# Patient Record
Sex: FEMALE | Race: WHITE | NOT HISPANIC OR LATINO | Employment: OTHER | URBAN - METROPOLITAN AREA
[De-identification: names, ages, dates, MRNs, and addresses within clinical notes are randomized per-mention and may not be internally consistent; named-entity substitution may affect disease eponyms.]

---

## 2017-01-13 ENCOUNTER — OFFICE VISIT (OUTPATIENT)
Dept: INTERNAL MEDICINE | Facility: CLINIC | Age: 57
End: 2017-01-13
Payer: COMMERCIAL

## 2017-01-13 VITALS
TEMPERATURE: 98 F | WEIGHT: 136 LBS | OXYGEN SATURATION: 96 % | SYSTOLIC BLOOD PRESSURE: 112 MMHG | DIASTOLIC BLOOD PRESSURE: 64 MMHG | HEART RATE: 59 BPM | BODY MASS INDEX: 25.71 KG/M2

## 2017-01-13 DIAGNOSIS — M79.5 FOREIGN BODY (FB) IN SOFT TISSUE: ICD-10-CM

## 2017-01-13 DIAGNOSIS — E55.9 VITAMIN D DEFICIENCY: ICD-10-CM

## 2017-01-13 DIAGNOSIS — Z11.59 NEED FOR HEPATITIS C SCREENING TEST: ICD-10-CM

## 2017-01-13 DIAGNOSIS — S69.91XS: Primary | ICD-10-CM

## 2017-01-13 DIAGNOSIS — E78.5 HYPERLIPIDEMIA LDL GOAL <70: ICD-10-CM

## 2017-01-13 LAB
ALBUMIN SERPL-MCNC: 4.1 G/DL (ref 3.4–5)
ALP SERPL-CCNC: 71 U/L (ref 40–150)
ALT SERPL W P-5'-P-CCNC: 34 U/L (ref 0–50)
ANION GAP SERPL CALCULATED.3IONS-SCNC: 3 MMOL/L (ref 3–14)
AST SERPL W P-5'-P-CCNC: 14 U/L (ref 0–45)
BILIRUB SERPL-MCNC: 0.4 MG/DL (ref 0.2–1.3)
BUN SERPL-MCNC: 8 MG/DL (ref 7–30)
CALCIUM SERPL-MCNC: 10 MG/DL (ref 8.5–10.1)
CHLORIDE SERPL-SCNC: 98 MMOL/L (ref 94–109)
CHOLEST SERPL-MCNC: 140 MG/DL
CO2 SERPL-SCNC: 31 MMOL/L (ref 20–32)
CREAT SERPL-MCNC: 0.64 MG/DL (ref 0.52–1.04)
GFR SERPL CREATININE-BSD FRML MDRD: ABNORMAL ML/MIN/1.7M2
GLUCOSE SERPL-MCNC: 91 MG/DL (ref 70–99)
HDLC SERPL-MCNC: 47 MG/DL
LDLC SERPL CALC-MCNC: 74 MG/DL
NONHDLC SERPL-MCNC: 93 MG/DL
POTASSIUM SERPL-SCNC: 5 MMOL/L (ref 3.4–5.3)
PROT SERPL-MCNC: 7.5 G/DL (ref 6.8–8.8)
SODIUM SERPL-SCNC: 132 MMOL/L (ref 133–144)
TRIGL SERPL-MCNC: 97 MG/DL

## 2017-01-13 PROCEDURE — 10120 INC&RMVL FB SUBQ TISS SMPL: CPT | Performed by: INTERNAL MEDICINE

## 2017-01-13 PROCEDURE — 36415 COLL VENOUS BLD VENIPUNCTURE: CPT | Performed by: INTERNAL MEDICINE

## 2017-01-13 PROCEDURE — 80061 LIPID PANEL: CPT | Performed by: INTERNAL MEDICINE

## 2017-01-13 PROCEDURE — 86803 HEPATITIS C AB TEST: CPT | Performed by: INTERNAL MEDICINE

## 2017-01-13 PROCEDURE — 80053 COMPREHEN METABOLIC PANEL: CPT | Performed by: INTERNAL MEDICINE

## 2017-01-13 PROCEDURE — 82306 VITAMIN D 25 HYDROXY: CPT | Performed by: INTERNAL MEDICINE

## 2017-01-13 PROCEDURE — 99215 OFFICE O/P EST HI 40 MIN: CPT | Mod: 25 | Performed by: INTERNAL MEDICINE

## 2017-01-13 NOTE — MR AVS SNAPSHOT
After Visit Summary   1/13/2017    Shirley Hendricks    MRN: [id number]           Patient Information     Date Of Birth          1960        Visit Information        Provider Department      1/13/2017 10:00 AM Eun Narvaez MD [facility]        Today's Diagnoses     Injury of right little finger, sequela    -  1     Vitamin D deficiency         Hyperlipidemia LDL goal <70         Need for hepatitis C screening test         Foreign body (FB) in soft tissue           Care Instructions    ** FOLLOW UP PLAN**:    PLEASE SCHEDULE OFFICE VISIT SOONEST AVAILABILITY FROM TODAY TO FOLLOW UP ON CHOLESTEROL, OTHER MEDICAL ISSUES, AND TO REVIEW TEST RESULTS       BE SURE TO SCHEDULE YOUR LAB DRAW APPOINTMENT FOR AT LEAST 1 WEEK BEFORE YOUR NEXT VISIT      YOU HAVE BEEN REFERRED FOR ORTHOPEDICS - HAND SURGERY TO ADDRESS ISSUES RAISED TODAY REGARDING HAND INJURY  PLEASE  MAKE SURE TO SCHEDULE ORTHOPEDICS APPOINTMENT(S) BY TELEPHONE      YOU MAY CONTACT THE CLINIC IF ANY QUESTIONS OR CONCERNS -[phone] OR [facility]                   Follow-ups after your visit        Additional Services     ORTHO  REFERRAL       [facility] is referring you to the Orthopedic  Services at [facility].       The  Representative will assist you in the coordination of your Orthopedic and Musculoskeletal Care as prescribed by your physician.    The  Representative will call you within 1 business day to help schedule your appointment, or you may contact the  Representative at:    All areas ~ [phone]     Type of Referral : hand surgery       Timeframe requested: 3 - 5 days    Coverage of these services is subject to the terms and limitations of your health insurance plan.  Please call member services at your health plan with any benefit or coverage questions.      If X-rays, CT or MRI's have been  performed, please contact the facility where they were done to arrange for , prior to your scheduled appointment.  Please bring this referral request to your appointment and present it to your specialist.                  Who to contact     If you have questions or need follow up information about today's clinic visit or your schedule please contact [facility] directly at [phone].  Normal or non-critical lab and imaging results will be communicated to you by MyChart, letter or phone within 4 business days after the clinic has received the results. If you do not hear from us within 7 days, please contact the clinic through [employer] or phone. If you have a critical or abnormal lab result, we will notify you by phone as soon as possible.  Submit refill requests through [employer] or call your pharmacy and they will forward the refill request to us. Please allow 3 business days for your refill to be completed.          Additional Information About Your Visit        [employer] Information     [employer] gives you secure access to your electronic health record. If you see a primary care provider, you can also send messages to your care team and make appointments. If you have questions, please call your primary care clinic.  If you do not have a primary care provider, please call [phone] and they will assist you.        Care EveryWhere ID     This is your Care EveryWhere ID. This could be used by other organizations to access your [facility] medical records  [id number]        Your Vitals Were     Pulse Temperature Pulse Oximetry             59 98  F (36.7  C) (Oral) 96%          Blood Pressure from Last 3 Encounters:   01/13/17 112/64   12/27/16 124/75   12/14/16 135/62    Weight from Last 3 Encounters:   01/13/17 136 lb (61.689 kg)   12/27/16 138 lb 5 oz (62.738 kg)   12/14/16 135 lb (61.236 kg)              We Performed the Following     Comprehensive metabolic panel     Hepatitis C  antibody     Lipid panel reflex to direct LDL     ORTHO  REFERRAL     REMOVE FOREIGN BODY SIMPLE     Vitamin D Deficiency        Primary Care Provider Office Phone # Fax #    Eun Narvaez [phone]       [facility] [address]  [facility] [address]        Thank you!     Thank you for choosing [facility]  for your care. Our goal is always to provide you with excellent care. Hearing back from our patients is one way we can continue to improve our services. Please take a few minutes to complete the written survey that you may receive in the mail after your visit with us. Thank you!             Your Updated Medication List - Protect others around you: Learn how to safely use, store and throw away your medicines at www.disposemymeds.org.          This list is accurate as of: 1/13/17 11:36 AM.  Always use your most recent med list.                   Brand Name Dispense Instructions for use    albuterol 108 (90 BASE) MCG/ACT Inhaler    PROAIR HFA/PROVENTIL HFA/VENTOLIN HFA    1 Inhaler    Inhale 2 puffs into the lungs every 6 hours as needed for shortness of breath / dyspnea or wheezing       ASPIRIN EC PO      Take 81 mg by mouth daily       atorvastatin 80 MG tablet    LIPITOR    90 tablet    Take 1 tablet (80 mg) by mouth every evening INDICATION: TO LOWER CHOLESTEROL AND TO HELP REDUCE RISK OF REOCCURRENCE OF HEART ATTACK STROKE, AND ARTERIAL BLOCKAGE       B COMPLEX 50 Tabs     100 tablet    Take 1 tablet by mouth every other day INDICATION: VITAMIN SUPPLEMENT       buPROPion 150 MG 12 hr tablet    WELLBUTRIN SR    60 tablet    Take 1 tablet (150 mg) by mouth 2 times daily INDICATION: TO TREAT DEPRESSION AND SMOKING CESSATION       calcium-vitamin D 600-400 MG-UNIT per tablet    calcium 600 + D    100 tablet    Take 1 tablet by mouth 2 times daily FOR BONE HEALTH AND FOR VITAMIN D DEFICIENCY (LOW VITAMIN D)       cholecalciferol 5000 UNITS Caps      100 capsule    Take 1 capsule (5,000 Units) by mouth every other day FOR VITAMIN D SUPPLEMENTATION       clopidogrel 75 MG tablet    PLAVIX    90 tablet    Take 1 tablet (75 mg) by mouth daily INDICATION: ANTIPLATE       cyabnocobalamin 2500 MCG sublingual tablet    VITAMIN B-12    100 tablet    Take 2,500 mcg by mouth daily INDICATION: FOR VITAMIN B12 SUPPLEMENTATION - TO IMPROVE MEMORY, BALANCE, SLEEP AND MOOD, DIRECTIONS: PLEASE PLACE UNDER THE TONGUE TO DISSOLVE       denosumab 60 MG/ML Soln injection    PROLIA    1 Syringe    Inject 1 mL (60 mg) Subcutaneous every 6 months INDICATION: TO TREAT OSTEOPOROSIS       ezetimibe 10 MG tablet    ZETIA    90 tablet    Take 1 tablet (10 mg) by mouth daily INDICATION: TO LOWER CHOLESTEROL       ferrous fumarate 65 mg ([place]. FE)-Vitamin C 125 mg  MG Tabs tablet    VITRON C    100 tablet    Take 1 tablet by mouth daily INDICATION: IRON SUPPLEMENT       ISOSORBIDE DINITRATE PO      Take 30 mg by mouth every morning Verified with Cub and patient med bottle as 30 mg Dinitrate daily.       lisinopril 40 MG tablet    PRINIVIL/ZESTRIL    90 tablet    Take 1 tablet (40 mg) by mouth daily INDICATION:TO LOWER BLOOD PRESSURE AND TO PRESERVE KIDNEY FUNCTION       METOPROLOL SUCCINATE ER PO      Take 12.5 mg by mouth 2 times daily       * nicotine 14 MG/24HR 24 hr patch    NICODERM CQ    30 patch    Place 1 patch onto the skin every 24 hours       * nicotine 7 MG/24HR 24 hr patch    NICODERM CQ    28 patch    Place 1 patch onto the skin every 24 hours       nicotine polacrilex 4 MG lozenge    COMMIT    100 tablet    Place 1 lozenge (4 mg) inside cheek as needed for smoking cessation Place 1 lozenge inside cheek as needed for smoking cessation.       nitroglycerin 0.4 MG sublingual tablet    NITROSTAT    15 tablet    Place 1 tablet (0.4 mg) under the tongue See Admin Instructions for chest pain       OMEGA-3 FISH OIL PO      Take 2 capsules by mouth 2 times daily        omeprazole 20 MG CR capsule    priLOSEC    90 capsule    Take 1 capsule (20 mg) by mouth daily INDICATION: TO CONTROL REFLUX SYMPTOMS       order for DME     1 Device    Equipment being ordered: BP MACHINE WITH PULSE MONITOR       traMADol 50 MG tablet    ULTRAM    120 tablet    Take 1 tablet (50 mg) by mouth every 6 hours as needed for moderate pain       TYLENOL PO      Take 500-1,000 mg by mouth every 6 hours as needed for mild pain or fever       vitamin C-electrolytes 1000mg vitamin C super orange drink mix    EMERGEN-C     Take 1 packet by mouth daily Mix 1 packet in 4-6oz water and take once or twice daily or as directed.       * Notice:  This list has 2 medication(s) that are the same as other medications prescribed for you. Read the directions carefully, and ask your doctor or other care provider to review them with you.

## 2017-01-13 NOTE — NURSING NOTE
"Chief Complaint   Patient presents with     Hyperlipidemia     Hypertension       Initial /64 mmHg  Pulse 59  Temp(Src) 98  F (36.7  C) (Oral)  Wt 136 lb (61.689 kg)  SpO2 96% Estimated body mass index is 25.71 kg/(m^2) as calculated from the following:    Height as of 12/14/16: 5' 1\" (1.549 m).    Weight as of this encounter: 136 lb (61.689 kg).  BP completed using cuff size: jocelyne Maynard CMA     "

## 2017-01-13 NOTE — PROGRESS NOTES
SUBJECTIVE:                                                    Shirley Hendricks is a 56 year old female who presents to clinic today for the following health issues:      Hyperlipidemia Follow-Up      Rate your low fat/cholesterol diet?: not monitoring fat    Taking statin?  Yes, no muscle aches from statin    Other lipid medications/supplements?:  Fish oil/Omega 3,     Hypertension Follow-up      Outpatient blood pressures are not being checked.    Low Salt Diet: no added salt     Vitamin D deficiency         Amount of exercise or physical activity: None    Problems taking medications regularly: No    Medication side effects: none    Diet: low salt    Injuryto the right pinky finger. She injured her finger on broken glass as she was doing dishes on 12/16/16.  Pt states she had stitches at the time, and was later given an antibiotic  for infection . Shirley is concerned regarding how the finger is healing. She has been good about not taking care of the area. The only time she has had the finger wet is when she does the dishes.      Foreign body: She is also concerned about the foreign body on the pad of the index finger of the right hand. She does not recall the date of the injury but states that it has been bothering her for a while.    Smoking still    Problem list and histories reviewed & adjusted, as indicated.  Additional history: as documented    Labs reviewed in EPIC  Problem list, Medication list, Allergies, and Medical/Social/Surgical histories reviewed in [facility] and updated as appropriate.    ROS:  14 point ROS negative except as above      OBJECTIVE:                                                    /64 mmHg  Pulse 59  Temp(Src) 98  F (36.7  C) (Oral)  Wt 136 lb (61.689 kg)  SpO2 96%  Body mass index is 25.71 kg/(m^2).  GENERAL: healthy, alert and no distress  EYES: Eyes grossly normal to inspection, PERRL and conjunctivae and sclerae normal  NECK: no adenopathy, no asymmetry, masses, or  scars and thyroid normal to palpation  RESP: lungs clear to auscultation - no rales, rhonchi or wheezes  CV: regular rate and rhythm, normal S1 S2, no S3 or S4, no murmur, click or rub, no peripheral edema and peripheral pulses strong  ABDOMEN: soft, nontender, no hepatosplenomegaly, no masses and bowel sounds normal  MS: abnormal DP pulse(s) bilaterally, but both extremities warm to touch, Slight flexion deformity of the DIP of the pinky finger of right hand. Question splinter in the pad of the right index finger.     Procedure:  FOREIGN BODY REMOVED BY SHARP LOCAL DISSECTION AFTER ADEQUATE SKIN PREP STERILE DRESSING APPLIED, PT TO FOLLOW UP AS NEEDED      Diagnostic Test Results:  none      ASSESSMENT/PLAN:                                                    Hyperlipidemia; controlled   Plan:  No changes in the patient's current treatment plan    Hypertension; controlled   Associated with the following complications:    Vascular Disease   Plan:  No changes in the patient's current treatment plan    Peripheral Vascular Disease (PVD); controlled   Associated with the following complications:    Atherosclerosis of the extremities with intermittent claudication   Plan:  No changes in the patient's current treatment plan        Tobacco Cessation:   reports that she quit smoking about 8 months ago. Her smoking use included Cigarettes. She started smoking about 58 years ago. She has a 40 pack-year smoking history. She has never used smokeless tobacco.        DIAGNOSIS/PLAN:     ICD-10-CM    1. Injury of right little finger, sequela S69.91XS ORTHO  REFERRAL   2. Vitamin D deficiency E55.9 Comprehensive metabolic panel     Vitamin D Deficiency   3. Hyperlipidemia LDL goal <70 E78.5 Comprehensive metabolic panel     Lipid panel reflex to direct LDL   4. Need for hepatitis C screening test Z11.59 Hepatitis C antibody   5. Foreign body (FB) in soft tissue M79.5 REMOVE FOREIGN BODY SIMPLE       SIGNIFICANT ISSUES RE  The primary encounter diagnosis was Injury of right little finger, sequela. Diagnoses of Vitamin D deficiency, Hyperlipidemia LDL goal <70, Need for hepatitis C screening test, and Foreign body (FB) in soft tissue were also pertinent to this visit. AS NOTED AND ADDRESSED ABOVE   MEDS AND AND LABS AS ORDERED TO ADDRESS PREVIOUS AND CURRENT ABNORMAL INDICES    SEE PATIENT INSTRUCTION SECTION FOR FOLLOW UP PLAN    Shirley IS TO CONTINUE OTHER TREATMENT REGIMEN/PLANS EXCEPT AS INDICATED    COMPLIANCE WITH MEDICATIONS DIET AND EXERCISE PLANS ENCOURAGED    DISCONTINUED MEDS:  Medications Discontinued During This Encounter   Medication Reason     fluticasone (FLOVENT HFA) 220 MCG/ACT inhaler Stopped by Patient     sulfamethoxazole-trimethoprim (BACTRIM DS/SEPTRA DS) 800-160 MG per tablet Therapy completed       CURRENT MED LIST WITH CHANGES AS NOTED BELOW:  Current Outpatient Prescriptions   Medication Sig Dispense Refill     nicotine (NICODERM CQ) 7 MG/24HR patch 2h hr Place 1 patch onto the skin every 24 hours 28 patch 5     nicotine polacrilex (COMMIT) 4 MG lozenge Place 1 lozenge (4 mg) inside cheek as needed for smoking cessation Place 1 lozenge inside cheek as needed for smoking cessation. 100 tablet 5     nicotine (NICODERM CQ) 14 MG/24HR patch 2h hr Place 1 patch onto the skin every 24 hours 30 patch 5     buPROPion (WELLBUTRIN SR) 150 MG 12 hr tablet Take 1 tablet (150 mg) by mouth 2 times daily INDICATION: TO TREAT DEPRESSION AND SMOKING CESSATION 60 tablet 12     ferrous fumarate 65 mg, [place]. FE,-Vitamin C 125 mg (VITRON C)  MG TABS Take 1 tablet by mouth daily INDICATION: IRON SUPPLEMENT 100 tablet prn     Acetaminophen (TYLENOL PO) Take 500-1,000 mg by mouth every 6 hours as needed for mild pain or fever       Omega-3 Fatty Acids (OMEGA-3 FISH OIL PO) Take 2 capsules by mouth 2 times daily       METOPROLOL SUCCINATE ER PO Take 12.5 mg by mouth 2 times daily       vitamin C-electrolytes (EMERGEN-C) 1000mg  vitamin C super orange drink mix Take 1 packet by mouth daily Mix 1 packet in 4-6oz water and take once or twice daily or as directed.       ASPIRIN EC PO Take 81 mg by mouth daily       ISOSORBIDE DINITRATE PO Take 30 mg by mouth every morning Verified with Cub and patient med bottle as 30 mg Dinitrate daily.       Cyanocobalamin (VITAMIN  B-12) 2500 MCG tablet Take 2,500 mcg by mouth daily INDICATION: FOR VITAMIN B12 SUPPLEMENTATION - TO IMPROVE MEMORY, BALANCE, SLEEP AND MOOD, DIRECTIONS: PLEASE PLACE UNDER THE TONGUE TO DISSOLVE 100 tablet 3     cholecalciferol 5000 UNITS CAPS Take 1 capsule (5,000 Units) by mouth every other day FOR VITAMIN D SUPPLEMENTATION 100 capsule PRN     omeprazole (PRILOSEC) 20 MG capsule Take 1 capsule (20 mg) by mouth daily INDICATION: TO CONTROL REFLUX SYMPTOMS 90 capsule prn     B Complex Vitamins (B COMPLEX 50) TABS Take 1 tablet by mouth every other day INDICATION: VITAMIN SUPPLEMENT 100 tablet PRN     ezetimibe (ZETIA) 10 MG tablet Take 1 tablet (10 mg) by mouth daily INDICATION: TO LOWER CHOLESTEROL 90 tablet PRN     calcium-vitamin D (CALCIUM 600 + D) 600-400 MG-UNIT per tablet Take 1 tablet by mouth 2 times daily FOR BONE HEALTH AND FOR VITAMIN D DEFICIENCY (LOW VITAMIN D) 100 tablet PRN     lisinopril (PRINIVIL,ZESTRIL) 40 MG tablet Take 1 tablet (40 mg) by mouth daily INDICATION:TO LOWER BLOOD PRESSURE AND TO PRESERVE KIDNEY FUNCTION 90 tablet 3     clopidogrel (PLAVIX) 75 MG tablet Take 1 tablet (75 mg) by mouth daily INDICATION: ANTIPLATE 90 tablet 3     albuterol (PROAIR HFA, PROVENTIL HFA, VENTOLIN HFA) 108 (90 BASE) MCG/ACT inhaler Inhale 2 puffs into the lungs every 6 hours as needed for shortness of breath / dyspnea or wheezing 1 Inhaler prn     atorvastatin (LIPITOR) 80 MG tablet Take 1 tablet (80 mg) by mouth every evening INDICATION: TO LOWER CHOLESTEROL AND TO HELP REDUCE RISK OF REOCCURRENCE OF HEART ATTACK STROKE, AND ARTERIAL BLOCKAGE 90 tablet prn      denosumab (PROLIA) 60 MG/ML SOLN Inject 1 mL (60 mg) Subcutaneous every 6 months INDICATION: TO TREAT OSTEOPOROSIS 1 Syringe PRN     ORDER FOR DME Equipment being ordered: BP MACHINE WITH PULSE MONITOR 1 Device PRN     nitroglycerin (NITROSTAT) 0.4 MG SL tablet Place 1 tablet (0.4 mg) under the tongue See Admin Instructions for chest pain 15 tablet 7     traMADol (ULTRAM) 50 MG tablet Take 1 tablet (50 mg) by mouth every 6 hours as needed for moderate pain 120 tablet 0         Office visit time > 40 mins, greater than 50% of which was spent obtaining history, reviewing medications, counseling re compliance with treatment plan, discussion of treatment, follow up plans, and coordination of care. Time spent did not include time spent on minor procedure.            Patient Instructions   ** FOLLOW UP PLAN**:    PLEASE SCHEDULE OFFICE VISIT SOONEST AVAILABILITY FROM TODAY TO FOLLOW UP ON CHOLESTEROL, OTHER MEDICAL ISSUES, AND TO REVIEW TEST RESULTS       BE SURE TO SCHEDULE YOUR LAB DRAW APPOINTMENT FOR AT LEAST 1 WEEK BEFORE YOUR NEXT VISIT      YOU HAVE BEEN REFERRED FOR ORTHOPEDICS - HAND SURGERY TO ADDRESS ISSUES RAISED TODAY REGARDING HAND INJURY  PLEASE  MAKE SURE TO SCHEDULE ORTHOPEDICS APPOINTMENT(S) BY TELEPHONE      YOU MAY CONTACT THE CLINIC IF ANY QUESTIONS OR CONCERNS -[phone] OR VIA JOSEPH Narvaez MD  [facility]

## 2017-01-13 NOTE — PATIENT INSTRUCTIONS
** FOLLOW UP PLAN**:    PLEASE SCHEDULE OFFICE VISIT SOONEST AVAILABILITY FROM TODAY TO FOLLOW UP ON CHOLESTEROL, OTHER MEDICAL ISSUES, AND TO REVIEW TEST RESULTS       BE SURE TO SCHEDULE YOUR LAB DRAW APPOINTMENT FOR AT LEAST 1 WEEK BEFORE YOUR NEXT VISIT      YOU HAVE BEEN REFERRED FOR ORTHOPEDICS - HAND SURGERY TO ADDRESS ISSUES RAISED TODAY REGARDING HAND INJURY  PLEASE  MAKE SURE TO SCHEDULE ORTHOPEDICS APPOINTMENT(S) BY TELEPHONE      YOU MAY CONTACT THE CLINIC IF ANY QUESTIONS OR CONCERNS -[phone] OR VIA [employer]

## 2017-01-16 ENCOUNTER — OFFICE VISIT (OUTPATIENT)
Dept: ORTHOPEDICS | Facility: CLINIC | Age: 57
End: 2017-01-16
Payer: COMMERCIAL

## 2017-01-16 VITALS
HEIGHT: 61 IN | SYSTOLIC BLOOD PRESSURE: 108 MMHG | WEIGHT: 136 LBS | BODY MASS INDEX: 25.68 KG/M2 | DIASTOLIC BLOOD PRESSURE: 58 MMHG

## 2017-01-16 DIAGNOSIS — S61.219A LACERATION OF FINGER, INITIAL ENCOUNTER: Primary | ICD-10-CM

## 2017-01-16 LAB
DEPRECATED CALCIDIOL+CALCIFEROL SERPL-MC: ABNORMAL UG/L (ref 20–75)
HCV AB SERPL QL IA: NORMAL

## 2017-01-16 PROCEDURE — 99202 OFFICE O/P NEW SF 15 MIN: CPT | Performed by: ORTHOPAEDIC SURGERY

## 2017-01-16 NOTE — NURSING NOTE
"Chief Complaint   Patient presents with     Finger     Right 5th finger       Initial /58 mmHg  Ht 5' 1\" (1.549 m)  Wt 136 lb (61.689 kg)  BMI 25.71 kg/m2 Estimated body mass index is 25.71 kg/(m^2) as calculated from the following:    Height as of this encounter: 5' 1\" (1.549 m).    Weight as of this encounter: 136 lb (61.689 kg).  BP completed using cuff size: regular    "

## 2017-01-16 NOTE — PROGRESS NOTES
HISTORY OF PRESENT ILLNESS:    Shirley Hendricks is a 56 year old female who is seen in consultation at the request of Dr. Narvaez for right 5th finger injury    Present symptoms: Pt states she injured her finger on broken glass as she was doing dishes on 12/16/16.  Pt states she had stitches at the time, later was given an antibiotic  for infection .  Pt is concerned regarding how the finger is healing, has concerns with callus development. No functional deficit. Apparently she has been good about not taking the area. The only time she has the finger wet as when she does the dishes.  Treatments tried to this point: stitches, antibiotic, neosporin - topical  Orthopedic PMH: left knee - 90's    Past Medical History   Diagnosis Date     Peripheral vascular disease, unspecified (H) 12/00     s/p angioplasty with stent right femoral a.; Right iliac and femoral a. 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 a.     C nonspecific procedure  1987     sinus surgery     C nonspecific procedure  9/2/2009     Emergent left groin exploration with oversewing of bleeding angiographic site. 2. Endarterectomy of common femoral-proximal superficial femoral artery with greater saphenous vein patch angioplasty.   a. [place] of accessory right greater saphenous vein.      C nonspecific procedure  8/27/2009     occluded left common  iliac and external iliac arteries were successfully revascularized with stenting to 8 and 7 mm      Cardiac catherization  9/3/2009     multivessel CAD without target lesions, med mgmt indicated, preserved ef     Vascular surgery  aoto bi fem  left fem-AK pop     C fabric wrapping of abdominal aneurysm       Gyn surgery  left tube     Angiogram       Orthopedic surgery  left knee     Endarterectomy carotid Right 5/11/2016     Procedure: ENDARTERECTOMY CAROTID;  Surgeon: Chadwick Lozano MD;  Location: [facility]       Family History   Problem Relation Age of Onset     Cardiovascular Father      alive,multiple heart attacks     DIABETES Maternal Grandmother      Blood Disease Brother      clotting disorder     CANCER Mother      bladder       Social History     Social History     Marital Status:      Spouse Name: N/A     Number of Children: N/A     Years of Education: N/A     Occupational History     Not on file.     Social History Main Topics     Smoking status: Former Smoker -- 1.00 packs/day for 40 years     Types: Cigarettes     Start date: 1960     Quit date: 05/30/2016     Smokeless tobacco: Never Used      Comment: quit date 3/19/2016     Alcohol Use: 0.0 oz/week     0 Standard drinks or equivalent per week      Comment: ocasional     Drug Use: No     Sexual Activity:     Partners: Male     Birth Control/ Protection: Surgical     Other Topics Concern     Not on file     Social History Narrative       Current Outpatient Prescriptions   Medication Sig Dispense Refill     traMADol (ULTRAM) 50 MG tablet Take 1 tablet (50 mg) by mouth every 6 hours as needed for moderate pain 120 tablet 0     nicotine (NICODERM CQ) 7 MG/24HR patch 2h hr Place 1 patch onto the skin every 24 hours 28 patch 5     nicotine polacrilex (COMMIT) 4 MG lozenge Place 1 lozenge (4 mg) inside cheek as needed for smoking cessation Place 1 lozenge inside cheek as needed for smoking cessation. 100 tablet 5     nicotine (NICODERM  CQ) 14 MG/24HR patch 2h hr Place 1 patch onto the skin every 24 hours 30 patch 5     buPROPion (WELLBUTRIN SR) 150 MG 12 hr tablet Take 1 tablet (150 mg) by mouth 2 times daily INDICATION: TO TREAT DEPRESSION AND SMOKING CESSATION 60 tablet 12     ferrous fumarate 65 mg, [place]. FE,-Vitamin C 125 mg (VITRON C)  MG TABS Take 1 tablet by mouth daily INDICATION: IRON SUPPLEMENT 100 tablet prn     Acetaminophen (TYLENOL PO) Take 500-1,000 mg by mouth every 6 hours as needed for mild pain or fever       Omega-3 Fatty Acids (OMEGA-3 FISH OIL PO) Take 2 capsules by mouth 2 times daily       METOPROLOL SUCCINATE ER PO Take 12.5 mg by mouth 2 times daily       vitamin C-electrolytes (EMERGEN-C) 1000mg vitamin C super orange drink mix Take 1 packet by mouth daily Mix 1 packet in 4-6oz water and take once or twice daily or as directed.       ASPIRIN EC PO Take 81 mg by mouth daily       ISOSORBIDE DINITRATE PO Take 30 mg by mouth every morning Verified with [facility] and patient med bottle as 30 mg Dinitrate daily.       Cyanocobalamin (VITAMIN  B-12) 2500 MCG tablet Take 2,500 mcg by mouth daily INDICATION: FOR VITAMIN B12 SUPPLEMENTATION - TO IMPROVE MEMORY, BALANCE, SLEEP AND MOOD, DIRECTIONS: PLEASE PLACE UNDER THE TONGUE TO DISSOLVE 100 tablet 3     cholecalciferol 5000 UNITS CAPS Take 1 capsule (5,000 Units) by mouth every other day FOR VITAMIN D SUPPLEMENTATION 100 capsule PRN     omeprazole (PRILOSEC) 20 MG capsule Take 1 capsule (20 mg) by mouth daily INDICATION: TO CONTROL REFLUX SYMPTOMS 90 capsule prn     B Complex Vitamins (B COMPLEX 50) TABS Take 1 tablet by mouth every other day INDICATION: VITAMIN SUPPLEMENT 100 tablet PRN     ezetimibe (ZETIA) 10 MG tablet Take 1 tablet (10 mg) by mouth daily INDICATION: TO LOWER CHOLESTEROL 90 tablet PRN     calcium-vitamin D (CALCIUM 600 + D) 600-400 MG-UNIT per tablet Take 1 tablet by mouth 2 times daily FOR BONE HEALTH AND FOR VITAMIN D DEFICIENCY (LOW VITAMIN D) 100 tablet  PRN     lisinopril (PRINIVIL,ZESTRIL) 40 MG tablet Take 1 tablet (40 mg) by mouth daily INDICATION:TO LOWER BLOOD PRESSURE AND TO PRESERVE KIDNEY FUNCTION 90 tablet 3     clopidogrel (PLAVIX) 75 MG tablet Take 1 tablet (75 mg) by mouth daily INDICATION: ANTIPLATE 90 tablet 3     albuterol (PROAIR HFA, PROVENTIL HFA, VENTOLIN HFA) 108 (90 BASE) MCG/ACT inhaler Inhale 2 puffs into the lungs every 6 hours as needed for shortness of breath / dyspnea or wheezing 1 Inhaler prn     atorvastatin (LIPITOR) 80 MG tablet Take 1 tablet (80 mg) by mouth every evening INDICATION: TO LOWER CHOLESTEROL AND TO HELP REDUCE RISK OF REOCCURRENCE OF HEART ATTACK STROKE, AND ARTERIAL BLOCKAGE 90 tablet prn     denosumab (PROLIA) 60 MG/ML SOLN Inject 1 mL (60 mg) Subcutaneous every 6 months INDICATION: TO TREAT OSTEOPOROSIS 1 Syringe PRN     ORDER FOR DME Equipment being ordered: BP MACHINE WITH PULSE MONITOR 1 Device PRN     nitroglycerin (NITROSTAT) 0.4 MG SL tablet Place 1 tablet (0.4 mg) under the tongue See Admin Instructions for chest pain 15 tablet 7       Allergies   Allergen Reactions     Contrast Dye Anaphylaxis     RASH, FACIAL AND NECK SWELLING, SOB, WHEEZING     Pantoprazole      Protonix caused diffuse edema     Penicillins Itching       REVIEW OF SYSTEMS:  CONSTITUTIONAL:  NEGATIVE for fever, chills, change in weight  INTEGUMENTARY/SKIN:  NEGATIVE for worrisome rashes, moles or lesions  EYES:  NEGATIVE for vision changes or irritation  ENT/MOUTH:  NEGATIVE for ear, mouth and throat problems  RESP:  NEGATIVE for significant cough or SOB  BREAST:  NEGATIVE for masses, tenderness or discharge  CV: hypertension NEGATIVE for chest pain, palpitations or peripheral edema  GI: heartburn / acid reflux  :  Negative   MUSCULOSKELETAL:  See HPI above  NEURO:  NEGATIVE for weakness, dizziness or paresthesias  ENDOCRINE:  NEGATIVE for temperature intolerance, skin/hair changes  HEME/ALLERGY/IMMUNE:  NEGATIVE for bleeding  "problems  PSYCHIATRIC:  depression      PHYSICAL EXAM:  Ht 5' 1\" (1.549 m)  Wt 136 lb (61.689 kg)  BMI 25.71 kg/m2  Body mass index is 25.71 kg/(m^2).   GENERAL APPEARANCE: healthy, alert and no distress   HEENT: No apparent thyroid megaly. Clear sclera with normal ocular movement  RESPIRATORY: No labored breathing  SKIN: no suspicious lesions or rashes  NEURO: Normal strength and tone, mentation intact and speech normal  VASCULAR: Good pulses, and capillary refill   LYMPH: no lymphadenopathy   PSYCH:  mentation appears normal and affect normal/bright    MUSCULOSKELETAL:  Normal gait  Symmetrical appearing hands with exception of dry minimally scalloped skin at the laceration site of the right little finger   IP joints have full motion  No erythema is noted  No drainage is noted  Gross sensations within normal limits    ASSESSMENT:  History of laceration of right little finger with abnormal skin  Probably secondary to previous infection    PLAN:  It is possible that she may be developing wart like lesion at the laceration site.  For the time being protecting the skin from mechanical irritation for the next 4-6 weeks was felt the best option for her.  We talked about surgical excision if it bothers her enough in the future.  Continuation of Neosporin/bacitracin with protection of the skin with Band-Aid on a daily basis was recommended.  Follow-up in 6-8 weeks as needed.    Imaging Interpretation:   None taken today      Julio Cesar Zamora MD  Department of Orthopedic Surgery        Disclaimer: This note consists of symbols derived from keyboarding, dictation and/or voice recognition software. As a result, there may be errors in the script that have gone undetected. Please consider this when interpreting information found in this chart.    "

## 2017-01-30 DIAGNOSIS — M15.0 PRIMARY OSTEOARTHRITIS INVOLVING MULTIPLE JOINTS: Primary | ICD-10-CM

## 2017-01-30 RX ORDER — TRAMADOL HYDROCHLORIDE 50 MG/1
50 TABLET ORAL EVERY 6 HOURS PRN
Qty: 120 TABLET | Refills: 0 | Status: SHIPPED | OUTPATIENT
Start: 2017-01-30 | End: 2017-02-22

## 2017-01-30 NOTE — TELEPHONE ENCOUNTER
traMADol (ULTRAM) 50 MG tablet      Last Written Prescription Date:  12/23/16  Last Fill Quantity: 120,   # refills: 0  Last Office Visit with [facility], [facility] or [facility] prescribing provider: 01/13/17  Future Office visit:    Next 5 appointments (look out 90 days)     Mar 03, 2017 12:00 PM   [facility] with [facility]    [address] [address] [phone]                   Routing refill request to provider for review/approval because:  Drug not on the [facility], [facility] or [facility] refill protocol or controlled substance

## 2017-02-13 ENCOUNTER — DOCUMENTATION ONLY (OUTPATIENT)
Dept: INTERNAL MEDICINE | Facility: CLINIC | Age: 57
End: 2017-02-13

## 2017-02-13 DIAGNOSIS — E55.9 VITAMIN D DEFICIENCY: ICD-10-CM

## 2017-02-13 DIAGNOSIS — I10 ESSENTIAL HYPERTENSION: Primary | ICD-10-CM

## 2017-02-22 DIAGNOSIS — M15.0 PRIMARY OSTEOARTHRITIS INVOLVING MULTIPLE JOINTS: ICD-10-CM

## 2017-02-22 NOTE — TELEPHONE ENCOUNTER
Controlled Substance Refill Request for tramadol HCl 50mg  Problem List Complete:  Yes    Last Written Prescription Date:  01/30/17  Last Fill Quantity: 120,   # refills: 0    Last Office Visit with [facility] primary care provider: 01/13/17    Clinic visit frequency required: Q 3 months     Future Office visit: 03/03/17  Next 5 appointments (look out 90 days)     Mar 03, 2017 12:00 PM CST   Office Visit with Eun Narvaez MD   [facility]    [address] [place] [address] [phone]                  Controlled substance agreement on file: Yes:  Date 03/03/16.     Processing:  Fax Rx to [facility] [address] FAX: [phone] pharmacy   checked in past 6 months?  No, route to RN

## 2017-02-23 RX ORDER — TRAMADOL HYDROCHLORIDE 50 MG/1
50 TABLET ORAL EVERY 6 HOURS PRN
Qty: 120 TABLET | Refills: 0 | Status: SHIPPED | OUTPATIENT
Start: 2017-02-23 | End: 2017-03-28

## 2017-03-06 ENCOUNTER — TELEPHONE (OUTPATIENT)
Dept: INTERNAL MEDICINE | Facility: CLINIC | Age: 57
End: 2017-03-06

## 2017-03-06 NOTE — TELEPHONE ENCOUNTER
Pt called to reschedule appt that was cancelled.  She was upset to hear she would have to wait for another appt.  Told to let Dr Narvaez let her know when she has time for her.

## 2017-03-06 NOTE — TELEPHONE ENCOUNTER
Called pts' cell # busy and lmm on home # pt can call any day after midnight for same appt or sched for in May, or April 6 at 6:00pm

## 2017-03-15 ENCOUNTER — TELEPHONE (OUTPATIENT)
Dept: INTERNAL MEDICINE | Facility: CLINIC | Age: 57
End: 2017-03-15

## 2017-03-15 DIAGNOSIS — I10 ESSENTIAL HYPERTENSION, BENIGN: Primary | ICD-10-CM

## 2017-03-15 NOTE — TELEPHONE ENCOUNTER
Reason for Call:  Medication or medication refill:    Do you use a [facility]?  Name of the pharmacy and phone number for the current request:  [facility] [address] [place] - [phone]    Name of the medication requested: metoprolol    Other request: pt is out of the medication    Can we leave a detailed message on this number? YES    Phone number patient can be reached at: Home number on file [phone] (home)    Best Time: anytime    Call taken on 3/15/2017 at 8:37 AM by YANIAR RECIO

## 2017-03-15 NOTE — TELEPHONE ENCOUNTER
metoprolol       Last Written Prescription Date:  na  Last Fill Quantity: na,   # refills: na  Last Office Visit with [facility], [facility] or [facility] prescribing provider: 01/13/17  Future Office visit:  03/28/17  Next 5 appointments (look out 90 days)     Mar 28, 2017  8:30 AM CDT   Office Visit with Eun [facility]   [facility]    [address] [address] [phone]                   Routing refill request to provider for review/approval because:  Medication is reported/historical

## 2017-03-17 RX ORDER — METOPROLOL SUCCINATE 25 MG/1
12.5 TABLET, EXTENDED RELEASE ORAL 2 TIMES DAILY
Qty: 90 TABLET | Refills: 3 | Status: SHIPPED | OUTPATIENT
Start: 2017-03-17 | End: 2017-09-07

## 2017-03-20 ENCOUNTER — MYC REFILL (OUTPATIENT)
Dept: INTERNAL MEDICINE | Facility: CLINIC | Age: 57
End: 2017-03-20

## 2017-03-20 DIAGNOSIS — I73.9 PAD (PERIPHERAL ARTERY DISEASE) (H): ICD-10-CM

## 2017-03-20 RX ORDER — LISINOPRIL 40 MG/1
40 TABLET ORAL DAILY
Qty: 90 TABLET | Refills: 2 | Status: SHIPPED | OUTPATIENT
Start: 2017-03-20 | End: 2017-09-07

## 2017-03-20 NOTE — TELEPHONE ENCOUNTER
Lisinopril       Last Written Prescription Date: 3/3/16  Last Fill Quantity: 90, # refills: 3  Last Office Visit with [facility], [facility] prescribing provider: 1/13/17  Next 5 appointments (look out 90 days)     Mar 28, 2017  8:30 AM CDT   Office Visit with Eun Narvaez MD   [facility] ([facility])    [address] [place] [address] [phone]                   Potassium   Date Value Ref Range Status   01/13/2017 5.0 3.4 - 5.3 mmol/L Final     Creatinine   Date Value Ref Range Status   01/13/2017 0.64 0.52 - 1.04 mg/dL Final     BP Readings from Last 3 Encounters:   01/16/17 108/58   01/13/17 112/64   12/27/16 124/75     Prescription approved per [facility] Refill Protocol.

## 2017-03-20 NOTE — TELEPHONE ENCOUNTER
Message from MyChart:  Original authorizing provider: MD Shirley Cid Cammy Hendricks would like a refill of the following medications:  lisinopril (PRINIVIL,ZESTRIL) 40 MG tablet [Eun Narvaez MD]    Preferred pharmacy: [id number] - [facility] [address]    Comment:

## 2017-03-28 ENCOUNTER — OFFICE VISIT (OUTPATIENT)
Dept: INTERNAL MEDICINE | Facility: CLINIC | Age: 57
End: 2017-03-28
Payer: COMMERCIAL

## 2017-03-28 ENCOUNTER — TELEPHONE (OUTPATIENT)
Dept: INTERNAL MEDICINE | Facility: CLINIC | Age: 57
End: 2017-03-28

## 2017-03-28 VITALS
SYSTOLIC BLOOD PRESSURE: 128 MMHG | WEIGHT: 131.4 LBS | OXYGEN SATURATION: 98 % | DIASTOLIC BLOOD PRESSURE: 68 MMHG | HEART RATE: 53 BPM | HEIGHT: 61 IN | BODY MASS INDEX: 24.81 KG/M2 | TEMPERATURE: 98.1 F

## 2017-03-28 DIAGNOSIS — M15.0 PRIMARY OSTEOARTHRITIS INVOLVING MULTIPLE JOINTS: ICD-10-CM

## 2017-03-28 DIAGNOSIS — Z12.31 VISIT FOR SCREENING MAMMOGRAM: Primary | ICD-10-CM

## 2017-03-28 DIAGNOSIS — E55.9 VITAMIN D DEFICIENCY: ICD-10-CM

## 2017-03-28 DIAGNOSIS — E61.1 LOW IRON: ICD-10-CM

## 2017-03-28 DIAGNOSIS — F17.299 OTHER TOBACCO PRODUCT NICOTINE DEPENDENCE WITH NICOTINE-INDUCED DISORDER: ICD-10-CM

## 2017-03-28 DIAGNOSIS — Z87.891 HISTORY OF TOBACCO USE: ICD-10-CM

## 2017-03-28 DIAGNOSIS — E54 SCURVY: ICD-10-CM

## 2017-03-28 DIAGNOSIS — M81.0 OSTEOPOROSIS: ICD-10-CM

## 2017-03-28 DIAGNOSIS — Z87.891 HISTORY OF TOBACCO USE, PRESENTING HAZARDS TO HEALTH: ICD-10-CM

## 2017-03-28 DIAGNOSIS — Z71.89 COUNSELING REGARDING ADVANCED DIRECTIVES: ICD-10-CM

## 2017-03-28 DIAGNOSIS — I25.2 PERSONAL HISTORY OF MI (MYOCARDIAL INFARCTION): ICD-10-CM

## 2017-03-28 DIAGNOSIS — I10 ESSENTIAL HYPERTENSION: ICD-10-CM

## 2017-03-28 LAB
ALBUMIN SERPL-MCNC: 3.9 G/DL (ref 3.4–5)
ALP SERPL-CCNC: 61 U/L (ref 40–150)
ALT SERPL W P-5'-P-CCNC: 33 U/L (ref 0–50)
ANION GAP SERPL CALCULATED.3IONS-SCNC: 7 MMOL/L (ref 3–14)
AST SERPL W P-5'-P-CCNC: 19 U/L (ref 0–45)
BILIRUB SERPL-MCNC: 0.3 MG/DL (ref 0.2–1.3)
BUN SERPL-MCNC: 11 MG/DL (ref 7–30)
CALCIUM SERPL-MCNC: 9.5 MG/DL (ref 8.5–10.1)
CHLORIDE SERPL-SCNC: 103 MMOL/L (ref 94–109)
CO2 SERPL-SCNC: 27 MMOL/L (ref 20–32)
CREAT SERPL-MCNC: 0.62 MG/DL (ref 0.52–1.04)
DEPRECATED CALCIDIOL+CALCIFEROL SERPL-MC: 92 UG/L (ref 20–75)
GFR SERPL CREATININE-BSD FRML MDRD: NORMAL ML/MIN/1.7M2
GLUCOSE SERPL-MCNC: 81 MG/DL (ref 70–99)
MAGNESIUM SERPL-MCNC: 2.1 MG/DL (ref 1.6–2.3)
PHOSPHATE SERPL-MCNC: 3.9 MG/DL (ref 2.5–4.5)
POTASSIUM SERPL-SCNC: 5.1 MMOL/L (ref 3.4–5.3)
PROT SERPL-MCNC: 7.1 G/DL (ref 6.8–8.8)
SODIUM SERPL-SCNC: 137 MMOL/L (ref 133–144)
T4 FREE SERPL-MCNC: 1.19 NG/DL (ref 0.76–1.46)
TSH SERPL DL<=0.05 MIU/L-ACNC: 1.37 MU/L (ref 0.4–4)

## 2017-03-28 PROCEDURE — 84439 ASSAY OF FREE THYROXINE: CPT | Performed by: INTERNAL MEDICINE

## 2017-03-28 PROCEDURE — 36415 COLL VENOUS BLD VENIPUNCTURE: CPT | Performed by: INTERNAL MEDICINE

## 2017-03-28 PROCEDURE — 82306 VITAMIN D 25 HYDROXY: CPT | Performed by: INTERNAL MEDICINE

## 2017-03-28 PROCEDURE — 84443 ASSAY THYROID STIM HORMONE: CPT | Performed by: INTERNAL MEDICINE

## 2017-03-28 PROCEDURE — 99000 SPECIMEN HANDLING OFFICE-LAB: CPT | Performed by: INTERNAL MEDICINE

## 2017-03-28 PROCEDURE — 82180 ASSAY OF ASCORBIC ACID: CPT | Mod: 90 | Performed by: INTERNAL MEDICINE

## 2017-03-28 PROCEDURE — 83735 ASSAY OF MAGNESIUM: CPT | Performed by: INTERNAL MEDICINE

## 2017-03-28 PROCEDURE — 99215 OFFICE O/P EST HI 40 MIN: CPT | Performed by: INTERNAL MEDICINE

## 2017-03-28 PROCEDURE — 84100 ASSAY OF PHOSPHORUS: CPT | Performed by: INTERNAL MEDICINE

## 2017-03-28 PROCEDURE — 80053 COMPREHEN METABOLIC PANEL: CPT | Performed by: INTERNAL MEDICINE

## 2017-03-28 RX ORDER — NITROGLYCERIN 0.4 MG/1
0.4 TABLET SUBLINGUAL SEE ADMIN INSTRUCTIONS
Qty: 15 TABLET | Refills: 7 | Status: SHIPPED | OUTPATIENT
Start: 2017-03-28 | End: 2017-07-20

## 2017-03-28 RX ORDER — TRAMADOL HYDROCHLORIDE 50 MG/1
50 TABLET ORAL EVERY 6 HOURS PRN
Qty: 120 TABLET | Refills: 3 | Status: SHIPPED | OUTPATIENT
Start: 2017-03-28 | End: 2017-09-07

## 2017-03-28 RX ORDER — TRAMADOL HYDROCHLORIDE 50 MG/1
50 TABLET ORAL EVERY 6 HOURS PRN
Qty: 120 TABLET | Refills: 0 | Status: SHIPPED | OUTPATIENT
Start: 2017-03-28 | End: 2017-03-28

## 2017-03-28 RX ORDER — BUPROPION HYDROCHLORIDE 150 MG/1
150 TABLET ORAL EVERY MORNING
Qty: 90 TABLET | Refills: 3 | Status: SHIPPED | OUTPATIENT
Start: 2017-03-28 | End: 2017-07-20

## 2017-03-28 NOTE — PATIENT INSTRUCTIONS
** FOLLOW UP PLAN**:    PLEASE SCHEDULE LABS WITH OFFICE VISIT 3 MONTHS FROM TODAY TO FOLLOW UP ON  Vitamin D, smoking cessation and mood issues     BE SURE TO SCHEDULE YOUR LAB DRAW APPOINTMENT FOR AT LEAST 1 WEEK BEFORE YOUR NEXT VISIT    YOU MAY CONTACT THE CLINIC IF ANY QUESTIONS OR CONCERNS -[phone] OR VIA [employer]           PROLIA  Risk Evaluation and Mitigation Strategy Best Practice Advisory    In May 2015, the FDA required an update to the PROLIA  (denosumab) REMS (Risk Evaluation and Mitigation Strategy) to inform both providers and patients about potential serious risks related to PROLIA .    These potential serious risks include:    Hypocalcemia    Osteonecrosis of the jaw    Atypical femoral fractures    Serious Infections    Dermatologic reactions    To ensure compliance with these requirements [facility] has developed a workflow for those patients who will receive PROLIA  at clinic.  This workflow includes insurance verification, patient scheduling, patient education, and documentation. Registered Nurses in the clinic should have completed eLearning related to the REMS and the clinic workflow.  Refer to them to initiate this process.  This workflow does not need to be executed if the patient is to receive PROLIA  injection at [facility].       The  has put together a Patient Education Toolkit.  Copies of these handouts may be available your clinic. Patients must receive the Patient Brochure and Medication Guide when receiving injection at clinic.  These will be attached to the injection ordered from [facility] [employer] to the clinic. Links to handouts:  Patient Counseling Chart for Healthcare Providers  Patient Brochure  Prescribing Information  Medication Guide    If you are having trouble accessing the above links, these documents can be found at: [email or web address]    The role of healthcare  provider includes reviewing patient education materials with the patient, advising patients to seek medical attention if they have signs or symptoms of one of the serious risks, and provide patients a copy of the Patient Brochure and Medication Guide when receiving their injection.      Due to the risk of hypocalcemia the Prescribing Information for PROLIA  recommends that calcium and mineral levels (magnesium and phosphorus) be checked within 14 days of injection for those predisposed to hypocalcemia.

## 2017-03-28 NOTE — MR AVS SNAPSHOT
After Visit Summary   3/28/2017    Shirley Hendricks    MRN: [id number]           Patient Information     Date Of Birth          1960        Visit Information        Provider Department      3/28/2017 8:30 AM Eun Narvaez MD [facility]        Today's Diagnoses     Visit for screening mammogram    -  1    Primary osteoarthritis involving multiple joints        Counseling regarding advanced directives        Essential hypertension        Vitamin D deficiency        History of tobacco use, presenting hazards to health        History of tobacco use        Other tobacco product nicotine dependence with nicotine-induced disorder        Scurvy        Low iron        Osteoporosis        Personal history of MI (myocardial infarction)          Care Instructions      ** FOLLOW UP PLAN**:    PLEASE SCHEDULE LABS WITH OFFICE VISIT 3 MONTHS FROM TODAY TO FOLLOW UP ON  Vitamin D, smoking cessation and mood issues     BE SURE TO SCHEDULE YOUR LAB DRAW APPOINTMENT FOR AT LEAST 1 WEEK BEFORE YOUR NEXT VISIT    YOU MAY CONTACT THE CLINIC IF ANY QUESTIONS OR CONCERNS -[phone] OR VIA [employer]           PROLIA  Risk Evaluation and Mitigation Strategy Best Practice Advisory    In May 2015, the FDA required an update to the PROLIA  (denosumab) REMS (Risk Evaluation and Mitigation Strategy) to inform both providers and patients about potential serious risks related to PROLIA .    These potential serious risks include:    Hypocalcemia    Osteonecrosis of the jaw    Atypical femoral fractures    Serious Infections    Dermatologic reactions    To ensure compliance with these requirements [facility] has developed a workflow for those patients who will receive PROLIA  at clinic.  This workflow includes insurance verification, patient scheduling, patient education, and documentation. Registered Nurses in the clinic should have completed eLearning related to the REMS and the clinic  workflow.  Refer to them to initiate this process.  This workflow does not need to be executed if the patient is to receive PROLIA  injection at [facility].       The  has put together a Patient Education Toolkit.  Copies of these handouts may be available your clinic. Patients must receive the Patient Brochure and Medication Guide when receiving injection at clinic.  These will be attached to the injection ordered from [employer] to the clinic. Links to handouts:  Patient Counseling Chart for Healthcare Providers  Patient Brochure  Prescribing Information  Medication Guide    If you are having trouble accessing the above links, these documents can be found at: [email or web address]    The role of healthcare provider includes reviewing patient education materials with the patient, advising patients to seek medical attention if they have signs or symptoms of one of the serious risks, and provide patients a copy of the Patient Brochure and Medication Guide when receiving their injection.      Due to the risk of hypocalcemia the Prescribing Information for PROLIA  recommends that calcium and mineral levels (magnesium and phosphorus) be checked within 14 days of injection for those predisposed to hypocalcemia.            Follow-ups after your visit        Future tests that were ordered for you today     Open Future Orders        Priority Expected Expires Ordered    Vitamin D Deficiency Routine 3/28/2017 9/25/2017 3/28/2017    *MA Screening Digital Bilateral Routine  3/28/2018 3/28/2017            Who to contact     If you have questions or need follow up information about today's clinic visit or your schedule please contact [facility] directly at [phone].  Normal or non-critical lab and imaging results will be communicated to you by MyChart, letter or phone within 4 business days after  "the clinic has received the results. If you do not hear from us within 7 days, please contact the clinic through [employer] or phone. If you have a critical or abnormal lab result, we will notify you by phone as soon as possible.  Submit refill requests through [employer] or call your pharmacy and they will forward the refill request to us. Please allow 3 business days for your refill to be completed.          Additional Information About Your Visit        [employer] Information     [employer] gives you secure access to your electronic health record. If you see a primary care provider, you can also send messages to your care team and make appointments. If you have questions, please call your primary care clinic.  If you do not have a primary care provider, please call [phone] and they will assist you.        Care EveryWhere ID     This is your Care EveryWhere ID. This could be used by other organizations to access your [facility] medical records  [id number]        Your Vitals Were     Pulse Temperature Height Pulse Oximetry BMI (Body Mass Index)       53 98.1  F (36.7  C) (Oral) 5' 1\" (1.549 m) 98% 24.83 kg/m2        Blood Pressure from Last 3 Encounters:   03/28/17 128/68   01/16/17 108/58   01/13/17 112/64    Weight from Last 3 Encounters:   03/28/17 131 lb 6.4 oz (59.6 kg)   01/16/17 136 lb (61.7 kg)   01/13/17 136 lb (61.7 kg)              We Performed the Following     Comprehensive metabolic panel     Magnesium     Phosphorus     T4 free     TSH     Vitamin C     Vitamin D Deficiency          Today's Medication Changes          These changes are accurate as of: 3/28/17 10:01 AM.  If you have any questions, ask your nurse or doctor.               Start taking these medicines.        Dose/Directions    buPROPion 150 MG 24 hr tablet   Commonly known as:  WELLBUTRIN XL   Used for:  History of tobacco use, presenting hazards to health   Replaces:  buPROPion 150 MG 12 hr tablet   Started by:  Eun Narvaez MD        " Dose:  150 mg   Take 1 tablet (150 mg) by mouth every morning INDICATION: TO TREAT DEPRESSION AND SMOKING CESSATION   Quantity:  90 tablet   Refills:  3       cholecalciferol 5000 UNITS Caps   Used for:  Vitamin D deficiency   Started by:  Eun Narvaez MD        Dose:  1 capsule   Take 1 capsule (5,000 Units) by mouth every other day FOR VITAMIN D SUPPLEMENTATION, DO NOT TAKE FOR THE NEXT 2 MONTH   Quantity:  100 capsule   Refills:  PRN       traMADol 50 MG tablet   Commonly known as:  ULTRAM   Used for:  Primary osteoarthritis involving multiple joints   Started by:  Eun Narvaez MD        Dose:  50 mg   Take 1 tablet (50 mg) by mouth every 6 hours as needed for moderate pain   Quantity:  120 tablet   Refills:  3         These medicines have changed or have updated prescriptions.        Dose/Directions    * denosumab 60 MG/ML Soln injection   Commonly known as:  PROLIA   This may have changed:  Another medication with the same name was added. Make sure you understand how and when to take each.   Used for:  Osteoporosis   Changed by:  Eun Narvaez MD        Dose:  60 mg   Inject 1 mL (60 mg) Subcutaneous every 6 months INDICATION: TO TREAT OSTEOPOROSIS   Quantity:  1 Syringe   Refills:  PRN       * denosumab 60 MG/ML Soln injection   Commonly known as:  PROLIA   This may have changed:  You were already taking a medication with the same name, and this prescription was added. Make sure you understand how and when to take each.   Used for:  Osteoporosis   Changed by:  Eun Narvaez MD        Dose:  60 mg   Inject 1 mL (60 mg) Subcutaneous once for 1 dose   Quantity:  1 mL   Refills:  0       nicotine 7 MG/24HR 24 hr patch   Commonly known as:  NICODERM CQ   This may have changed:  Another medication with the same name was removed. Continue taking this medication, and follow the directions you see here.   Used for:  History of tobacco use, Other tobacco product nicotine dependence with  nicotine-induced disorder   Changed by:  Eun Narvaez MD        Dose:  1 patch   Place 1 patch onto the skin every 24 hours   Quantity:  28 patch   Refills:  5       * Notice:  This list has 2 medication(s) that are the same as other medications prescribed for you. Read the directions carefully, and ask your doctor or other care provider to review them with you.      Stop taking these medicines if you haven't already. Please contact your care team if you have questions.     buPROPion 150 MG 12 hr tablet   Commonly known as:  WELLBUTRIN SR   Replaced by:  buPROPion 150 MG 24 hr tablet   Stopped by:  Eun Narvaez MD                Where to get your medicines      These medications were sent to [id number] - [facility] [address]  [address] [facility] [address]     Phone:  [phone]     buPROPion 150 MG 24 hr tablet    ferrous fumarate 65 mg ([place]. FE)-Vitamin C 125 mg  MG Tabs tablet    nicotine 7 MG/24HR 24 hr patch    nitroglycerin 0.4 MG sublingual tablet         Some of these will need a paper prescription and others can be bought over the counter.  Ask your nurse if you have questions.     Bring a paper prescription for each of these medications     denosumab 60 MG/ML Soln injection    traMADol 50 MG tablet       You don't need a prescription for these medications     cholecalciferol 5000 UNITS Caps                Primary Care Provider Office Phone # Fax #    Eun Narvaez [phone]       [facility] [address]  [facility][1] [id number]        Thank you!     Thank you for choosing [facility]  for your care. Our goal is always to provide you with excellent care. Hearing back from our patients is one way we can continue to improve our services. Please take a few minutes to complete the written survey that you may receive in the mail after your visit with us. Thank you!             Your Updated  Medication List - Protect others around you: Learn how to safely use, store and throw away your medicines at www.disposemymeds.org.          This list is accurate as of: 3/28/17 10:01 AM.  Always use your most recent med list.                   Brand Name Dispense Instructions for use    albuterol 108 (90 BASE) MCG/ACT Inhaler    PROAIR HFA/PROVENTIL HFA/VENTOLIN HFA    1 Inhaler    Inhale 2 puffs into the lungs every 6 hours as needed for shortness of breath / dyspnea or wheezing       ASPIRIN EC PO      Take 81 mg by mouth daily       atorvastatin 80 MG tablet    LIPITOR    90 tablet    Take 1 tablet (80 mg) by mouth every evening INDICATION: TO LOWER CHOLESTEROL AND TO HELP REDUCE RISK OF REOCCURRENCE OF HEART ATTACK STROKE, AND ARTERIAL BLOCKAGE       B COMPLEX 50 Tabs     100 tablet    Take 1 tablet by mouth every other day INDICATION: VITAMIN SUPPLEMENT       buPROPion 150 MG 24 hr tablet    WELLBUTRIN XL    90 tablet    Take 1 tablet (150 mg) by mouth every morning INDICATION: TO TREAT DEPRESSION AND SMOKING CESSATION       calcium-vitamin D 600-400 MG-UNIT per tablet    calcium 600 + D    100 tablet    Take 1 tablet by mouth 2 times daily FOR BONE HEALTH AND FOR VITAMIN D DEFICIENCY (LOW VITAMIN D)       cholecalciferol 5000 UNITS Caps     100 capsule    Take 1 capsule (5,000 Units) by mouth every other day FOR VITAMIN D SUPPLEMENTATION, DO NOT TAKE FOR THE NEXT 2 MONTH       clopidogrel 75 MG tablet    PLAVIX    90 tablet    Take 1 tablet (75 mg) by mouth daily INDICATION: ANTIPLATE       cyabnocobalamin 2500 MCG sublingual tablet    VITAMIN B-12    100 tablet    Take 2,500 mcg by mouth daily INDICATION: FOR VITAMIN B12 SUPPLEMENTATION - TO IMPROVE MEMORY, BALANCE, SLEEP AND MOOD, DIRECTIONS: PLEASE PLACE UNDER THE TONGUE TO DISSOLVE       * denosumab 60 MG/ML Soln injection    PROLIA    1 Syringe    Inject 1 mL (60 mg) Subcutaneous every 6 months INDICATION: TO TREAT OSTEOPOROSIS       * denosumab 60 MG/ML  Soln injection    PROLIA    1 mL    Inject 1 mL (60 mg) Subcutaneous once for 1 dose       ezetimibe 10 MG tablet    ZETIA    90 tablet    Take 1 tablet (10 mg) by mouth daily INDICATION: TO LOWER CHOLESTEROL       ferrous fumarate 65 mg ([place]. FE)-Vitamin C 125 mg  MG Tabs tablet    VITRON C    100 tablet    Take 1 tablet by mouth daily INDICATION: IRON SUPPLEMENT       ISOSORBIDE DINITRATE PO      Take 30 mg by mouth every morning Verified with [facility] and patient med bottle as 30 mg Dinitrate daily.       lisinopril 40 MG tablet    PRINIVIL/ZESTRIL    90 tablet    Take 1 tablet (40 mg) by mouth daily INDICATION:TO LOWER BLOOD PRESSURE AND TO PRESERVE KIDNEY FUNCTION       metoprolol 25 MG 24 hr tablet    TOPROL-XL    90 tablet    Take 0.5 tablets (12.5 mg) by mouth 2 times daily INDICATION:TO LOWER BLOOD PRESSURE AND TO HELP PREVENT HEART DISEASE       nicotine 7 MG/24HR 24 hr patch    NICODERM CQ    28 patch    Place 1 patch onto the skin every 24 hours       nicotine polacrilex 4 MG lozenge    COMMIT    100 tablet    Place 1 lozenge (4 mg) inside cheek as needed for smoking cessation Place 1 lozenge inside cheek as needed for smoking cessation.       nitroglycerin 0.4 MG sublingual tablet    NITROSTAT    15 tablet    Place 1 tablet (0.4 mg) under the tongue See Admin Instructions for chest pain       OMEGA-3 FISH OIL PO      Take 2 capsules by mouth 2 times daily       omeprazole 20 MG CR capsule    priLOSEC    90 capsule    Take 1 capsule (20 mg) by mouth daily INDICATION: TO CONTROL REFLUX SYMPTOMS       order for DME     1 Device    Equipment being ordered: BP MACHINE WITH PULSE MONITOR       traMADol 50 MG tablet    ULTRAM    120 tablet    Take 1 tablet (50 mg) by mouth every 6 hours as needed for moderate pain       TYLENOL PO      Take 500-1,000 mg by mouth every 6 hours as needed for mild pain or fever       vitamin C-electrolytes 1000mg vitamin C super orange drink mix    EMERGEN-C     Take 1 packet  by mouth daily Mix 1 packet in 4-6oz water and take once or twice daily or as directed.       * Notice:  This list has 2 medication(s) that are the same as other medications prescribed for you. Read the directions carefully, and ask your doctor or other care provider to review them with you.

## 2017-03-28 NOTE — NURSING NOTE
"Chief Complaint   Patient presents with     Recheck Medication       Initial /68  Pulse 53  Temp 98.1  F (36.7  C) (Oral)  Ht 5' 1\" (1.549 m)  Wt 131 lb 6.4 oz (59.6 kg)  SpO2 98%  BMI 24.83 kg/m2 Estimated body mass index is 24.83 kg/(m^2) as calculated from the following:    Height as of this encounter: 5' 1\" (1.549 m).    Weight as of this encounter: 131 lb 6.4 oz (59.6 kg).  Medication Reconciliation: complete    "

## 2017-03-28 NOTE — TELEPHONE ENCOUNTER
Prior authorization    Medication name prolia  Insurance [employer]  Insurance ID number [id number]  Prior authorization faxed through cover my meds

## 2017-03-28 NOTE — PROGRESS NOTES
SUBJECTIVE:                                                    Shirley Hendricks is a 56 year old female who presents to clinic today for the following health issues:    Review 1/13/17 lab results for vitamin D and Lipids    Hyperlipidemia Follow-Up      Rate your low fat/cholesterol diet?: fair    Taking statin?  Yes, possible muscle aches from statin    Other lipid medications/supplements?:  Fish oil/Omega 3, without side effects       Amount of exercise or physical activity: None    Problems taking medications regularly: No    Medication side effects: none    Diet: regular (no restrictions)      Hypertension Follow-up      Outpatient blood pressures are not being checked.    Low Salt Diet: no added salt     Vascular Disease Follow-up:  Coronary Artery and Peripheral Vascular Disease      Chest pain or pressure, left side neck or arm pain: No    Shortness of breath/increased sweats/nausea with exertion: No    Pain in calves walking 1-2 blocks: No    Worsened or new symptoms since last visit: No    Nitroglycerin use: no    Daily aspirin use: Yes     Other significant issues as outlined and addressed in the plan section of this note.  Shirley has been under a little bit of stress as her  is dealing with some medical issues at this time. But she is holding up except for the fact that she has been smoking a few cigarettes she is currently on the nicotine patch and Wellbutrin for her mood and smoking cessation. She does admit to feeling a bit depressed about her 's medical situation.    Problem list and histories reviewed & adjusted, as indicated.  Additional history: as documented    Labs reviewed in EPIC, Recent labs are as noted and addressed in the plan section of this note. Labs essentially within acceptable limits except for vitamin D which is overcorrected. She is due for follow-up labs today.    Reviewed and updated as needed this visit by clinical staff  Tobacco  Allergies  Problems  Med Hx  " Surg Hx  Fam Hx  Soc Hx      Reviewed and updated as needed this visit by Provider         ROS:  14 point ROS negative except as above      OBJECTIVE:                                                    /68  Pulse 53  Temp 98.1  F (36.7  C) (Oral)  Ht 5' 1\" (1.549 m)  Wt 131 lb 6.4 oz (59.6 kg)  SpO2 98%  BMI 24.83 kg/m2  Body mass index is 24.83 kg/(m^2).  GENERAL: healthy, alert and no distress  EYES: Eyes grossly normal to inspection, PERRL and conjunctivae and sclerae normal  NECK: no adenopathy, no asymmetry, masses, or scars and thyroid normal to palpation  RESP: lungs clear to auscultation - no rales, rhonchi or wheezes  CV: regular rate and rhythm, normal S1 S2, no S3 or S4, no murmur, click or rub, no peripheral edema and peripheral pulses strong  ABDOMEN: soft, nontender, no hepatosplenomegaly, no masses and bowel sounds normal  MS: no gross musculoskeletal defects noted, no edema  SKIN: no suspicious lesions or rashes  NEURO: Normal strength and tone, mentation intact and speech normal  PSYCH: mentation appears normal, affect normal/bright  Both legs feel warm, and appear pink and color.    Diagnostic Test Results:  Results for orders placed or performed in visit on 01/13/17   Comprehensive metabolic panel   Result Value Ref Range    Sodium 132 (L) 133 - 144 mmol/L    Potassium 5.0 3.4 - 5.3 mmol/L    Chloride 98 94 - 109 mmol/L    Carbon Dioxide 31 20 - 32 mmol/L    Anion Gap 3 3 - 14 mmol/L    Glucose 91 70 - 99 mg/dL    Urea Nitrogen 8 7 - 30 mg/dL    Creatinine 0.64 0.52 - 1.04 mg/dL    GFR Estimate >90  Non  GFR Calc   >60 mL/min/1.7m2    GFR Estimate If Black >90   GFR Calc   >60 mL/min/1.7m2    Calcium 10.0 8.5 - 10.1 mg/dL    Bilirubin Total 0.4 0.2 - 1.3 mg/dL    Albumin 4.1 3.4 - 5.0 g/dL    Protein Total 7.5 6.8 - 8.8 g/dL    Alkaline Phosphatase 71 40 - 150 U/L    ALT 34 0 - 50 U/L    AST 14 0 - 45 U/L   Lipid panel reflex to direct LDL   Result " Value Ref Range    Cholesterol 140 <200 mg/dL    Triglycerides 97 <150 mg/dL    HDL Cholesterol 47 (L) >49 mg/dL    LDL Cholesterol Calculated 74 <100 mg/dL    Non HDL Cholesterol 93 <130 mg/dL   Hepatitis C antibody   Result Value Ref Range    Hepatitis C Antibody  NR     Nonreactive   Assay performance characteristics have not been established for newborns,   infants, and children     Vitamin D Deficiency   Result Value Ref Range    Vitamin D Deficiency screening (H) 20 - 75 ug/L     >96  Season, race, dietary intake, and treatment affect the concentration of   25-hydroxy-Vitamin D. Values may decrease during winter months and increase   during summer months. Values 20-29 ug/L may indicate Vitamin D insufficiency   and values <20 ug/L may indicate Vitamin D deficiency.   Vitamin D determination is routinely performed by an immunoassay specific for   25 hydroxyvitamin D3.  If an individual is on vitamin D2 (ergocalciferol)   supplementation, please specify 25 OH vitamin D2 and D3 level determination by   LCMSMS test [id number].     ORTHO  REFERRAL    Impression    Patient is scheduled on 1/16/17 in  with Dr. Zamora        ASSESSMENT/PLAN:                                                    Hyperlipidemia; controlled   Plan:  No changes in the patient's current treatment plan    Hypertension; controlled   Associated with the following complications:    None   Plan:  No changes in the patient's current treatment plan    Coronary Artery and Peripheral Vascular Disease; controlled   Associated with the following complications:    Bypass graft of LLE   Plan:  No changes in the patient's current treatment plan      Tobacco Cessation:   reports that she has been smoking Cigarettes.  She started smoking about 58 years ago. She has a 10.00 pack-year smoking history. She has never used smokeless tobacco.  Tobacco Cessation Action Plan: Pharmacotherapies : Zyban/Wellbutrin and Nicotine patch    DIAGNOSIS/PLAN:      ICD-10-CM    1. Visit for screening mammogram Z12.31 *MA Screening Digital Bilateral   2. Primary osteoarthritis involving multiple joints M15.0 traMADol (ULTRAM) 50 MG tablet     DISCONTINUED: traMADol (ULTRAM) 50 MG tablet   3. Counseling regarding advanced directives Z71.89    4. Essential hypertension I10 T4 free     TSH     Comprehensive metabolic panel   5. Vitamin D deficiency E55.9 Vitamin D Deficiency     cholecalciferol 5000 UNITS CAPS     Vitamin D Deficiency   6. History of tobacco use, presenting hazards to health Z87.891 buPROPion (WELLBUTRIN XL) 150 MG 24 hr tablet   7. History of tobacco use Z87.891 nicotine (NICODERM CQ) 7 MG/24HR 24 hr patch   8. Other tobacco product nicotine dependence with nicotine-induced disorder F17.299 nicotine (NICODERM CQ) 7 MG/24HR 24 hr patch   9. Scurvy E54 ferrous fumarate 65 mg, [place]. FE,-Vitamin C 125 mg (VITRON C)  MG TABS tablet     Vitamin C   10. Low iron E61.1 ferrous fumarate 65 mg, [place]. FE,-Vitamin C 125 mg (VITRON C)  MG TABS tablet     Vitamin C   11. Osteoporosis M81.0 Magnesium     Phosphorus     denosumab (PROLIA) 60 MG/ML SOLN injection     DISCONTINUED: denosumab (PROLIA) 60 MG/ML SOLN injection    SEVERE   12. Personal history of MI (myocardial infarction) I25.2 nitroglycerin (NITROSTAT) 0.4 MG sublingual tablet       SIGNIFICANT ISSUES RE The primary encounter diagnosis was Visit for screening mammogram. Diagnoses of Primary osteoarthritis involving multiple joints, Counseling regarding advanced directives, Essential hypertension, Vitamin D deficiency, History of tobacco use, presenting hazards to health, History of tobacco use, Other tobacco product nicotine dependence with nicotine-induced disorder, Scurvy, Low iron, Osteoporosis, and Personal history of MI (myocardial infarction) were also pertinent to this visit. AS NOTED AND ADDRESSED ABOVE   MEDS AND AND LABS AS ORDERED TO ADDRESS PREVIOUS AND CURRENT ABNORMAL INDICES    SEE PATIENT  INSTRUCTION SECTION FOR FOLLOW UP PLAN    Shirley IS TO CONTINUE OTHER TREATMENT REGIMEN/PLANS EXCEPT AS INDICATED    COMPLIANCE WITH MEDICATIONS DIET AND EXERCISE PLANS ENCOURAGED    DISCONTINUED MEDS:  Medications Discontinued During This Encounter   Medication Reason     traMADol (ULTRAM) 50 MG tablet Reorder     buPROPion (WELLBUTRIN SR) 150 MG 12 hr tablet Reorder     traMADol (ULTRAM) 50 MG tablet Reorder     nicotine (NICODERM CQ) 14 MG/24HR patch 2h hr      nicotine (NICODERM CQ) 7 MG/24HR patch 2h hr Reorder     ferrous fumarate 65 mg, [place]. FE,-Vitamin C 125 mg (VITRON C)  MG TABS Reorder     nitroglycerin (NITROSTAT) 0.4 MG SL tablet Reorder     denosumab (PROLIA) 60 MG/ML SOLN injection        CURRENT MED LIST WITH CHANGES AS NOTED BELOW:  Current Outpatient Prescriptions   Medication Sig Dispense Refill     buPROPion (WELLBUTRIN XL) 150 MG 24 hr tablet Take 1 tablet (150 mg) by mouth every morning INDICATION: TO TREAT DEPRESSION AND SMOKING CESSATION 90 tablet 3     traMADol (ULTRAM) 50 MG tablet Take 1 tablet (50 mg) by mouth every 6 hours as needed for moderate pain 120 tablet 3     nicotine (NICODERM CQ) 7 MG/24HR 24 hr patch Place 1 patch onto the skin every 24 hours 28 patch 5     ferrous fumarate 65 mg, [place]. FE,-Vitamin C 125 mg (VITRON C)  MG TABS tablet Take 1 tablet by mouth daily INDICATION: IRON SUPPLEMENT 100 tablet prn     nitroglycerin (NITROSTAT) 0.4 MG sublingual tablet Place 1 tablet (0.4 mg) under the tongue See Admin Instructions for chest pain 15 tablet 7     cholecalciferol 5000 UNITS CAPS Take 1 capsule (5,000 Units) by mouth every other day FOR VITAMIN D SUPPLEMENTATION, DO NOT TAKE FOR THE NEXT 2 MONTH 100 capsule PRN     denosumab (PROLIA) 60 MG/ML SOLN injection Inject 1 mL (60 mg) Subcutaneous once for 1 dose 1 mL 0     lisinopril (PRINIVIL/ZESTRIL) 40 MG tablet Take 1 tablet (40 mg) by mouth daily INDICATION:TO LOWER BLOOD PRESSURE AND TO PRESERVE KIDNEY FUNCTION  90 tablet 2     metoprolol (TOPROL-XL) 25 MG 24 hr tablet Take 0.5 tablets (12.5 mg) by mouth 2 times daily INDICATION:TO LOWER BLOOD PRESSURE AND TO HELP PREVENT HEART DISEASE 90 tablet 3     nicotine polacrilex (COMMIT) 4 MG lozenge Place 1 lozenge (4 mg) inside cheek as needed for smoking cessation Place 1 lozenge inside cheek as needed for smoking cessation. 100 tablet 5     Acetaminophen (TYLENOL PO) Take 500-1,000 mg by mouth every 6 hours as needed for mild pain or fever       Omega-3 Fatty Acids (OMEGA-3 FISH OIL PO) Take 2 capsules by mouth 2 times daily       vitamin C-electrolytes (EMERGEN-C) 1000mg vitamin C super orange drink mix Take 1 packet by mouth daily Mix 1 packet in 4-6oz water and take once or twice daily or as directed.       ASPIRIN EC PO Take 81 mg by mouth daily       ISOSORBIDE DINITRATE PO Take 30 mg by mouth every morning Verified with [facility] and patient med bottle as 30 mg Dinitrate daily.       Cyanocobalamin (VITAMIN  B-12) 2500 MCG tablet Take 2,500 mcg by mouth daily INDICATION: FOR VITAMIN B12 SUPPLEMENTATION - TO IMPROVE MEMORY, BALANCE, SLEEP AND MOOD, DIRECTIONS: PLEASE PLACE UNDER THE TONGUE TO DISSOLVE 100 tablet 3     omeprazole (PRILOSEC) 20 MG capsule Take 1 capsule (20 mg) by mouth daily INDICATION: TO CONTROL REFLUX SYMPTOMS 90 capsule prn     B Complex Vitamins (B COMPLEX 50) TABS Take 1 tablet by mouth every other day INDICATION: VITAMIN SUPPLEMENT 100 tablet PRN     ezetimibe (ZETIA) 10 MG tablet Take 1 tablet (10 mg) by mouth daily INDICATION: TO LOWER CHOLESTEROL 90 tablet PRN     calcium-vitamin D (CALCIUM 600 + D) 600-400 MG-UNIT per tablet Take 1 tablet by mouth 2 times daily FOR BONE HEALTH AND FOR VITAMIN D DEFICIENCY (LOW VITAMIN D) 100 tablet PRN     clopidogrel (PLAVIX) 75 MG tablet Take 1 tablet (75 mg) by mouth daily INDICATION: ANTIPLATE 90 tablet 3     albuterol (PROAIR HFA, PROVENTIL HFA, VENTOLIN HFA) 108 (90 BASE) MCG/ACT inhaler Inhale 2 puffs into the  lungs every 6 hours as needed for shortness of breath / dyspnea or wheezing 1 Inhaler prn     atorvastatin (LIPITOR) 80 MG tablet Take 1 tablet (80 mg) by mouth every evening INDICATION: TO LOWER CHOLESTEROL AND TO HELP REDUCE RISK OF REOCCURRENCE OF HEART ATTACK STROKE, AND ARTERIAL BLOCKAGE 90 tablet prn     denosumab (PROLIA) 60 MG/ML SOLN Inject 1 mL (60 mg) Subcutaneous every 6 months INDICATION: TO TREAT OSTEOPOROSIS 1 Syringe PRN     ORDER FOR DME Equipment being ordered: BP MACHINE WITH PULSE MONITOR 1 Device PRN     [DISCONTINUED] denosumab (PROLIA) 60 MG/ML SOLN injection Inject 1 mL (60 mg) Subcutaneous once for 1 dose 1 mL 0     [DISCONTINUED] buPROPion (WELLBUTRIN SR) 150 MG 12 hr tablet Take 1 tablet (150 mg) by mouth 2 times daily INDICATION: TO TREAT DEPRESSION AND SMOKING CESSATION 60 tablet 12     [DISCONTINUED] nitroglycerin (NITROSTAT) 0.4 MG SL tablet Place 1 tablet (0.4 mg) under the tongue See Admin Instructions for chest pain 15 tablet 7         Office visit time > 40 mins, greater than 50% of which was spent obtaining history, reviewing medications, counseling re compliance with treatment plan, discussion of treatment, follow up plans, and coordination of care.           Patient Instructions     ** FOLLOW UP PLAN**:    PLEASE SCHEDULE LABS WITH OFFICE VISIT 3 MONTHS FROM TODAY TO FOLLOW UP ON  Vitamin D, smoking cessation and mood issues     BE SURE TO SCHEDULE YOUR LAB DRAW APPOINTMENT FOR AT LEAST 1 WEEK BEFORE YOUR NEXT VISIT    YOU MAY CONTACT THE CLINIC IF ANY QUESTIONS OR CONCERNS -[phone] OR VIA [employer]           PROLIA  Risk Evaluation and Mitigation Strategy Best Practice Advisory    In May 2015, the FDA required an update to the PROLIA  (denosumab) REMS (Risk Evaluation and Mitigation Strategy) to inform both providers and patients about potential serious risks related to PROLIA .    These potential serious risks include:    Hypocalcemia    Osteonecrosis of the  jaw    Atypical femoral fractures    Serious Infections    Dermatologic reactions    To ensure compliance with these requirements [employer] has developed a workflow for those patients who will receive PROLIA  at clinic.  This workflow includes insurance verification, patient scheduling, patient education, and documentation. Registered Nurses in the clinic should have completed eLearning related to the REMS and the clinic workflow.  Refer to them to initiate this process.  This workflow does not need to be executed if the patient is to receive PROLIA  injection at [facility].       The  has put together a Patient Education Toolkit.  Copies of these handouts may be available your clinic. Patients must receive the Patient Brochure and Medication Guide when receiving injection at clinic.  These will be attached to the injection ordered from [employer] to the clinic. Links to handouts:  Patient Counseling Chart for Healthcare Providers  Patient Brochure  Prescribing Information  Medication Guide    If you are having trouble accessing the above links, these documents can be found at: [email or web address]    The role of healthcare provider includes reviewing patient education materials with the patient, advising patients to seek medical attention if they have signs or symptoms of one of the serious risks, and provide patients a copy of the Patient Brochure and Medication Guide when receiving their injection.      Due to the risk of hypocalcemia the Prescribing Information for PROLIA  recommends that calcium and mineral levels (magnesium and phosphorus) be checked within 14 days of injection for those predisposed to hypocalcemia.          Eun Narvaez MD  [facility]

## 2017-03-29 ASSESSMENT — PATIENT HEALTH QUESTIONNAIRE - PHQ9: SUM OF ALL RESPONSES TO PHQ QUESTIONS 1-9: 4

## 2017-03-30 LAB — VIT C SERPL-MCNC: 8 MG/DL

## 2017-03-31 DIAGNOSIS — G62.9 NEUROPATHY: Primary | ICD-10-CM

## 2017-03-31 RX ORDER — PYRITHIONE ZINC 1 G/ML
SHAMPOO TOPICAL
Qty: 15 TABLET | Status: SHIPPED | OUTPATIENT
Start: 2017-03-31 | End: 2017-08-14

## 2017-03-31 NOTE — TELEPHONE ENCOUNTER
EQL B Complex      Last Written Prescription Date: 03/03/16  Last Fill Quantity: 100,  # refills: prn   Last Office Visit with [facility], [facility] or [facility] prescribing provider: 03/28/17                                         Next 5 appointments (look out 90 days)     Jun 27, 2017  9:00 AM CDT   Office Visit with Eun Narvaez MD   [facility] ([facility])    [address] [address] [phone]

## 2017-04-06 DIAGNOSIS — G45.9 TRANSIENT CEREBRAL ISCHEMIA: ICD-10-CM

## 2017-04-06 DIAGNOSIS — I25.10 CORONARY ARTERY DISEASE INVOLVING NATIVE CORONARY ARTERY OF NATIVE HEART WITHOUT ANGINA PECTORIS: ICD-10-CM

## 2017-04-06 DIAGNOSIS — I73.9 PERIPHERAL VASCULAR DISEASE, UNSPECIFIED (H): ICD-10-CM

## 2017-04-06 DIAGNOSIS — I73.9 PAD (PERIPHERAL ARTERY DISEASE) (H): ICD-10-CM

## 2017-04-06 RX ORDER — CLOPIDOGREL BISULFATE 75 MG/1
TABLET ORAL
Qty: 90 TABLET | Refills: 0 | Status: SHIPPED | OUTPATIENT
Start: 2017-04-06 | End: 2017-07-11

## 2017-04-06 RX ORDER — EZETIMIBE 10 MG/1
TABLET ORAL
Qty: 90 TABLET | Status: SHIPPED | OUTPATIENT
Start: 2017-04-06 | End: 2017-09-07

## 2017-04-06 NOTE — TELEPHONE ENCOUNTER
clopidogrel (PLAVIX) 75 MG tablet       Last Written Prescription Date: 03/03/2016  Last Fill Quantity: 90, # refills: 3    Last Office Visit with [facility], [facility] or  Health prescribing provider:  03/28/2017   Future Office Visit:    Next 5 appointments (look out 90 days)     Jun 27, 2017  9:00 AM CDT   Office Visit with Eun Narvaez MD   [facility] ([facility])    [address] [id number] [phone]                   Lab Results   Component Value Date    WBC 6.9 07/07/2016     Lab Results   Component Value Date    RBC 4.06 07/07/2016     Lab Results   Component Value Date    HGB 12.9 07/07/2016     Lab Results   Component Value Date    HCT 39.3 07/07/2016     No components found for: MCT  Lab Results   Component Value Date    MCV 97 07/07/2016     Lab Results   Component Value Date    MCH 31.8 07/07/2016     Lab Results   Component Value Date    MCHC 32.8 07/07/2016     Lab Results   Component Value Date    RDW 13.5 07/07/2016     Lab Results   Component Value Date     07/07/2016     Lab Results   Component Value Date    AST 19 03/28/2017     Lab Results   Component Value Date    ALT 33 03/28/2017     Creatinine   Date Value Ref Range Status   03/28/2017 0.62 0.52 - 1.04 mg/dL Final   ]     ezetimibe (ZETIA) 10 MG tablet           Last Written Prescription Date: 03/03/2016  Last Fill Quantity: 90, # refills: prn    Last Office Visit with [facility], [facility] or  Health prescribing provider:  03/28/2016   Future Office Visit:    Next 5 appointments (look out 90 days)     Jun 27, 2017  9:00 AM CDT   Office Visit with Eun Narvaez MD   [facility] ([facility])    [address] [id number] [phone]                  Cholesterol   Date Value Ref Range Status   01/13/2017 140 <200 mg/dL Final     HDL Cholesterol   Date Value Ref Range Status   01/13/2017 47 (L) >49  mg/dL Final     LDL Cholesterol Calculated   Date Value Ref Range Status   01/13/2017 74 <100 mg/dL Final     Comment:     Desirable:       <100 mg/dl     Triglycerides   Date Value Ref Range Status   01/13/2017 97 <150 mg/dL Final     Cholesterol/HDL Ratio   Date Value Ref Range Status   03/15/2013 2.6 0.0 - 5.0 Final     ALT   Date Value Ref Range Status   03/28/2017 33 0 - 50 U/L Final

## 2017-04-06 NOTE — TELEPHONE ENCOUNTER
Zetia - Routing refill request to provider for review/approval because:  Labs out of range:  HDL    Plavix - Prescription approved per [facility] Refill Protocol.

## 2017-05-02 DIAGNOSIS — E55.9 VITAMIN D DEFICIENCY: Primary | ICD-10-CM

## 2017-05-02 DIAGNOSIS — E78.5 HYPERLIPIDEMIA LDL GOAL <70: ICD-10-CM

## 2017-05-02 DIAGNOSIS — I73.9 PAD (PERIPHERAL ARTERY DISEASE) (H): ICD-10-CM

## 2017-05-02 RX ORDER — ATORVASTATIN CALCIUM 80 MG/1
TABLET, FILM COATED ORAL
Qty: 90 TABLET | Refills: 2 | Status: SHIPPED | OUTPATIENT
Start: 2017-05-02 | End: 2017-07-20

## 2017-05-02 NOTE — TELEPHONE ENCOUNTER
Calcium 600      Last Written Prescription Date: 03/03/16  Last Fill Quantity: 100,  # refills: prn   Last Office Visit with [facility], [facility] or [facility] prescribing provider: 03/28/17                                         Next 5 appointments (look out 90 days)     Jun 27, 2017  9:00 AM CDT   Office Visit with Eun [facility]   [facility]    [address] [address]   [phone]                    atorvastatin     Last Written Prescription Date: 01/12/16  Last Fill Quantity: 90, # refills: prn  Last Office Visit with [facility], [facility] or [facility] prescribing provider: 03/28/17  Next 5 appointments (look out 90 days)     Jun 27, 2017  9:00 AM CDT   Office Visit with Eun Narvaez MD   [facility]    [address] [address]   [phone]                   Lab Results   Component Value Date    CHOL 140 01/13/2017     Lab Results   Component Value Date    HDL 47 01/13/2017     Lab Results   Component Value Date    LDL 74 01/13/2017     Lab Results   Component Value Date    TRIG 97 01/13/2017     Lab Results   Component Value Date    CHOLHDLRATIO 2.6 03/15/2013

## 2017-05-29 DIAGNOSIS — K21.00 REFLUX ESOPHAGITIS: ICD-10-CM

## 2017-07-11 DIAGNOSIS — I73.9 PERIPHERAL VASCULAR DISEASE, UNSPECIFIED (H): ICD-10-CM

## 2017-07-11 DIAGNOSIS — Z86.73 HISTORY OF TIA (TRANSIENT ISCHEMIC ATTACK) AND STROKE: Primary | ICD-10-CM

## 2017-07-11 DIAGNOSIS — G45.9 TRANSIENT CEREBRAL ISCHEMIA: ICD-10-CM

## 2017-07-11 NOTE — TELEPHONE ENCOUNTER
Clopidogrel           Last Written Prescription Date: 04/06/17  Last Fill Quantity: 90, # refills: 0    Last Office Visit with [facility], P or [facility] prescribing provider:  03/28/17   Future Office Visit:  07/20/17  Next 5 appointments (look out 90 days)     Jul 12, 2017  9:15 AM CDT   Lab visit with [facility]   [facility]    [address] [address]   [phone]            Jul 20, 2017  9:00 AM CDT   Office Visit with Eun Narvaez MD   [facility]    [address] [address]   [phone]            Aug 10, 2017 11:00 AM CDT   Return Visit with Chadwick Lozano MD   [facility] ([facility])    [address]  [facility] [address]   [phone]                   Lab Results   Component Value Date    WBC 6.9 07/07/2016     Lab Results   Component Value Date    RBC 4.06 07/07/2016     Lab Results   Component Value Date    HGB 12.9 07/07/2016     Lab Results   Component Value Date    HCT 39.3 07/07/2016     No components found for: MCT  Lab Results   Component Value Date    MCV 97 07/07/2016     Lab Results   Component Value Date    MCH 31.8 07/07/2016     Lab Results   Component Value Date    MCHC 32.8 07/07/2016     Lab Results   Component Value Date    RDW 13.5 07/07/2016     Lab Results   Component Value Date     07/07/2016     Lab Results   Component Value Date    AST 19 03/28/2017     Lab Results   Component Value Date    ALT 33 03/28/2017     Creatinine   Date Value Ref Range Status   03/28/2017 0.62 0.52 - 1.04 mg/dL Final   ]

## 2017-07-12 RX ORDER — CLOPIDOGREL BISULFATE 75 MG/1
TABLET ORAL
Qty: 90 TABLET | Refills: 0 | Status: SHIPPED | OUTPATIENT
Start: 2017-07-12 | End: 2017-11-05

## 2017-07-18 DIAGNOSIS — E55.9 VITAMIN D DEFICIENCY: ICD-10-CM

## 2017-07-18 PROCEDURE — 82306 VITAMIN D 25 HYDROXY: CPT | Performed by: INTERNAL MEDICINE

## 2017-07-18 PROCEDURE — 36415 COLL VENOUS BLD VENIPUNCTURE: CPT | Performed by: INTERNAL MEDICINE

## 2017-07-19 LAB — DEPRECATED CALCIDIOL+CALCIFEROL SERPL-MC: 48 UG/L (ref 20–75)

## 2017-07-20 ENCOUNTER — TELEPHONE (OUTPATIENT)
Dept: INTERNAL MEDICINE | Facility: CLINIC | Age: 57
End: 2017-07-20

## 2017-07-20 ENCOUNTER — OFFICE VISIT (OUTPATIENT)
Dept: INTERNAL MEDICINE | Facility: CLINIC | Age: 57
End: 2017-07-20
Payer: COMMERCIAL

## 2017-07-20 VITALS
RESPIRATION RATE: 16 BRPM | WEIGHT: 130.7 LBS | BODY MASS INDEX: 24.7 KG/M2 | HEART RATE: 55 BPM | OXYGEN SATURATION: 100 % | DIASTOLIC BLOOD PRESSURE: 80 MMHG | SYSTOLIC BLOOD PRESSURE: 130 MMHG | TEMPERATURE: 98.3 F

## 2017-07-20 DIAGNOSIS — J30.81 CHRONIC ALLERGIC RHINITIS DUE TO ANIMAL HAIR AND DANDER: ICD-10-CM

## 2017-07-20 DIAGNOSIS — I73.9 PAD (PERIPHERAL ARTERY DISEASE) (H): ICD-10-CM

## 2017-07-20 DIAGNOSIS — F17.200 CURRENT SMOKER: ICD-10-CM

## 2017-07-20 DIAGNOSIS — F17.299 OTHER TOBACCO PRODUCT NICOTINE DEPENDENCE WITH NICOTINE-INDUCED DISORDER: ICD-10-CM

## 2017-07-20 DIAGNOSIS — L30.9 DERMATITIS: ICD-10-CM

## 2017-07-20 DIAGNOSIS — G62.9 NEUROPATHY: ICD-10-CM

## 2017-07-20 DIAGNOSIS — E61.1 LOW IRON: ICD-10-CM

## 2017-07-20 DIAGNOSIS — F17.200 TOBACCO USE DISORDER: Primary | ICD-10-CM

## 2017-07-20 DIAGNOSIS — M81.0 OSTEOPOROSIS, UNSPECIFIED OSTEOPOROSIS TYPE, UNSPECIFIED PATHOLOGICAL FRACTURE PRESENCE: ICD-10-CM

## 2017-07-20 DIAGNOSIS — E55.9 VITAMIN D DEFICIENCY: ICD-10-CM

## 2017-07-20 DIAGNOSIS — E54 SCURVY: ICD-10-CM

## 2017-07-20 DIAGNOSIS — E78.5 HYPERLIPIDEMIA LDL GOAL <70: ICD-10-CM

## 2017-07-20 DIAGNOSIS — J44.9 CHRONIC OBSTRUCTIVE PULMONARY DISEASE, UNSPECIFIED COPD TYPE (H): ICD-10-CM

## 2017-07-20 DIAGNOSIS — I25.2 PERSONAL HISTORY OF MI (MYOCARDIAL INFARCTION): ICD-10-CM

## 2017-07-20 LAB
CALCIUM SERPL-MCNC: 9.3 MG/DL (ref 8.5–10.1)
MAGNESIUM SERPL-MCNC: 2.3 MG/DL (ref 1.6–2.3)
PHOSPHATE SERPL-MCNC: 3.6 MG/DL (ref 2.5–4.5)

## 2017-07-20 PROCEDURE — 36415 COLL VENOUS BLD VENIPUNCTURE: CPT | Performed by: INTERNAL MEDICINE

## 2017-07-20 PROCEDURE — 83735 ASSAY OF MAGNESIUM: CPT | Performed by: INTERNAL MEDICINE

## 2017-07-20 PROCEDURE — 82310 ASSAY OF CALCIUM: CPT | Performed by: INTERNAL MEDICINE

## 2017-07-20 PROCEDURE — 99215 OFFICE O/P EST HI 40 MIN: CPT | Performed by: INTERNAL MEDICINE

## 2017-07-20 PROCEDURE — 84100 ASSAY OF PHOSPHORUS: CPT | Performed by: INTERNAL MEDICINE

## 2017-07-20 RX ORDER — NITROGLYCERIN 0.4 MG/1
0.4 TABLET SUBLINGUAL SEE ADMIN INSTRUCTIONS
Qty: 15 TABLET | Refills: 7 | Status: SHIPPED | OUTPATIENT
Start: 2017-07-20 | End: 2019-05-03

## 2017-07-20 RX ORDER — CYANOCOBALAMIN (VITAMIN B-12) 2500 MCG
2500 TABLET, SUBLINGUAL SUBLINGUAL DAILY
Qty: 100 TABLET | Refills: 3 | Status: SHIPPED | OUTPATIENT
Start: 2017-07-20 | End: 2017-09-07

## 2017-07-20 RX ORDER — LORATADINE 10 MG/1
10 TABLET ORAL DAILY
Qty: 90 TABLET | Refills: 3 | Status: SHIPPED | OUTPATIENT
Start: 2017-07-20 | End: 2018-08-07

## 2017-07-20 RX ORDER — MV-MIN/VIT C/GLUT/LYSINE/HB124 1000-50 MG
1 TABLET, EFFERVESCENT ORAL EVERY MORNING
Qty: 30 TABLET | Refills: 11 | Status: SHIPPED | OUTPATIENT
Start: 2017-07-20 | End: 2017-09-07

## 2017-07-20 RX ORDER — ATORVASTATIN CALCIUM 80 MG/1
TABLET, FILM COATED ORAL
Qty: 90 TABLET | Refills: 2 | Status: SHIPPED | OUTPATIENT
Start: 2017-07-20 | End: 2017-09-07

## 2017-07-20 RX ORDER — ALBUTEROL SULFATE 90 UG/1
2 AEROSOL, METERED RESPIRATORY (INHALATION) EVERY 6 HOURS PRN
Qty: 1 INHALER | Status: SHIPPED | OUTPATIENT
Start: 2017-07-20 | End: 2017-09-07

## 2017-07-20 NOTE — NURSING NOTE
"Chief Complaint   Patient presents with     Thyroid Problem     Results     vitamin deficiencies       Initial /80  Pulse 55  Temp 98.3  F (36.8  C) (Oral)  Resp 16  Wt 130 lb 11.2 oz (59.3 kg)  SpO2 100%  BMI 24.7 kg/m2 Estimated body mass index is 24.7 kg/(m^2) as calculated from the following:    Height as of 3/28/17: 5' 1\" (1.549 m).    Weight as of this encounter: 130 lb 11.2 oz (59.3 kg).  Blood pressure completed using cuff size: regular    "

## 2017-07-20 NOTE — MR AVS SNAPSHOT
After Visit Summary   7/20/2017    Shirley Hendricks    MRN: [id number]           Patient Information     Date Of Birth          1960        Visit Information        Provider Department      7/20/2017 9:00 AM Eun Narvaez MD [facility]        Today's Diagnoses     Tobacco use disorder    -  1    PAD (peripheral artery disease) (H)        Chronic obstructive pulmonary disease, unspecified COPD type (H)        Neuropathy (H)        Hyperlipidemia LDL goal <70        Scurvy        Low iron        Vitamin D deficiency        Other tobacco product nicotine dependence with nicotine-induced disorder        Personal history of MI (myocardial infarction)        Chronic allergic rhinitis due to animal hair and dander        Osteoporosis, unspecified osteoporosis type, unspecified pathological fracture presence        Current smoker        Dermatitis          Care Instructions    ** FOLLOW UP PLAN**:    PLEASE SCHEDULE FASTING LABS WITH OFFICE VISIT 6 MONTHS FROM TODAY TO FOLLOW UP ON  Blood pressure, thyroid, vascular issues, and to review your test results     BE SURE TO SCHEDULE YOUR LAB DRAW APPOINTMENT FOR AT LEAST 5 DAYS BEFORE YOUR NEXT VISIT      YOU HAVE BEEN REFERRED FOR bone density scan, to dermatology and for CT scan of the lungs to screen for lung cancer   PLEASE  MAKE SURE TO SCHEDULE BONE DENSITY APPOINTMENT(S) AT THE   OR BY CALLING THE NUMBER BELOW AND  the rest of your APPOINTMENT(S) BY TELEPHONE      YOU MAY CONTACT THE CLINIC IF ANY QUESTIONS OR CONCERNS -[phone] OR VIA modu         Lung Cancer Screening   Frequently Asked Questions  If you are at high-risk for lung cancer, getting screened with low-dose computed tomography (LDCT) every year can help save your life. This handout offers answers to some of the most common questions about lung cancer screening. If you have other questions, please call [phone]  ([phone]).     What is it?  Lung cancer screening uses special X-ray technology to create an image of your lung tissue. The exam is quick and easy and takes less than 10 seconds. We don t give you any medicine or use any needles. You can eat before and after the exam. You don t need to change your clothes as long as the clothing on your chest doesn t contain metal. But, you do need to be able to hold your breath for at least 6 seconds during the exam.    What is the goal of lung cancer screening?  The goal of lung cancer screening is to save lives. Many times, lung cancer is not found until a person starts having physical symptoms. Lung cancer screening can help detect lung cancer in the earliest stages when it may be easier to treat.    Who should be screened for lung cancer?  We suggest lung cancer screening for anyone who is at high-risk for lung cancer. You are in the high-risk group if you:      are between the ages of 55 and 79, and    have smoked at least 1 pack of cigarettes a day for 30 or more years, and    still smoke or have quit within the past 15 years.    However, if you have a new cough or shortness of breath, you should talk to your doctor before being screened.    Some national lung health advocacy groups also recommend screening for people ages 50 to 79 who have smoked an average of 1 pack of cigarettes a day for 20 years. They must also have at least 1 other risk factor for lung cancer, not including exposure to secondhand smoke. Other risk factors are having had cancer in the past, emphysema, pulmonary fibrosis, COPD, a family history of lung cancer, or exposure to certain materials such as arsenic, asbestos, beryllium, cadmium, chromium, diesel fumes, nickel, radon or silica. Your care team can help you know if you have one of these risk factors.     Why does it matter if I have symptoms?  Certain symptoms can be a sign that you have a condition in your lungs that should be checked and  treated by your doctor. These symptoms include fever, chest pain, a new or changing cough, shortness of breath that you have never felt before, coughing up blood or unexplained weight loss. Having any of these symptoms can greatly affect the results of lung cancer screening.       Should all smokers get an LDCT lung cancer screening exam?  It depends. Lung cancer screening is for a very specific group of men and women who have a history of heavy smoking over a long period of time (see  Who should be screened for lung cancer  above).  I am in the high-risk group, but have been diagnosed with cancer in the past. Is LDCT lung cancer screening right for me?  In some cases, you should not have LDCT lung screening, such as when your doctor is already following your cancer with CT scan studies. Your doctor will help you decide if LDCT lung screening is right for you.  Do I need to have a screening exam every year?  Yes. If you are in the high-risk group described earlier, you should get an LDCT lung cancer screening exam every year until you are 79, or are no longer willing or able to undergo screening and possible procedures to diagnose and treat lung cancer.  How effective is LDCT at preventing death from lung cancer?  Studies have shown that LDCT lung cancer screening can lower the risk of death from lung cancer by 20 percent in people who are at high-risk.  What are the risks?  There are some risks and limitations of LDCT lung cancer screening. We want to make sure you understand the risks and benefits, so please let us know if you have any questions. Your doctor may want to talk with you more about these risks.    Radiation exposure: As with any exam that uses radiation, there is a very small increased risk of cancer. The amount of radiation in LDCT is small--about the same amount a person would get from a mammogram. Your doctor orders the exam when he or she feels the potential benefits outweigh the risks.    False  negatives: No test is perfect, including LDCT. It is possible that you may have a medical condition, including lung cancer, that is not found during your exam. This is called a false negative result.    False positives and more testing: LDCT very often finds something in the lung that could be cancer, but in fact is not. This is called a false positive result. False positive tests often cause anxiety. To make sure these findings are not cancer, you may need to have more tests. These tests will be done only if you give us permission. Sometimes patients need a treatment that can have side effects, such as a biopsy. For more information on false positives, see  What can I expect from the results?     Findings not related to lung cancer: Your LDCT exam also takes pictures of areas of your body next to your lungs. In a very small number of cases, the CT scan will show an abnormal finding in one of these areas, such as your kidneys, adrenal glands, liver or thyroid. This finding may not be serious, but you may need more tests. Your doctor can help you decide what other tests you may need, if any.  What can I expect from the results?  About 1 out of 4 LDCT exams will find something that may need more tests. Most of the time, these findings are lung nodules. Lung nodules are very small collections of tissue in the lung. These nodules are very common, and the vast majority--more than 97 percent--are not cancer (benign). Most are normal lymph nodes or small areas of scarring from past infections.  But, if a small lung nodule is found to be cancer, the cancer can be cured more than 90 percent of the time. To know if the nodule is cancer, we may need to get more images before your next yearly screening exam. If the nodule has suspicious features (for example, it is large, has an odd shape or grows over time), we will refer you to a specialist for further testing.  Will my doctor also get the results?  Yes. Your doctor will get  a copy of your results.  Is it okay to keep smoking now that there s a cancer screening exam?  No. Tobacco is one of the strongest cancer-causing agents. It causes not only lung cancer, but other cancers and cardiovascular (heart) diseases as well. The damage caused by smoking builds over time. This means that the longer you smoke, the higher your risk of disease. While it is never too late to quit, the sooner you quit, the better.  Where can I find help to quit smoking?  The best way to prevent lung cancer is to stop smoking. If you have already quit smoking, congratulations and keep it up! For help on quitting smoking, please call [employer] at 5-470-495-PLAN (1828) or the American Cancer Society at 1-679.313.8074 to find local resources near you.  One-on-one health coaching:  If you d prefer to work individually with a health care provider on tobacco cessation, we offer:      Medication Therapy Management:  Our specially trained pharmacists work closely with you and your doctor to help you quit smoking.  Call [phone] or [phone] (toll free).     Can Do: Health coaching offered by [facility].  www.can-do-health.com    Resources to Help You Quit Smoking  If you have quit smoking or are thinking about quitting, congratulations! It can be hard to quit smoking, but the benefits are well worth it. To help you quit and stay smoke-free, there are many resources that can help.   Your health plan  If you have health insurance, call them for more details about their phone coaching programs.    Blue Cross and Blue [facility]: 1-888-662-BLUE     [employer]: 1-888-662-QUIT     [facility]: 1-888-662-[facility]: [phone]     [employer]: 1-336.559.1711     [place] Comprehensive Health Association members: 1-880.450.2169     [facility]: [phone]     [facility] Health Plan: 1-321.194.4480     [facility]: [phone]     [facility]  [facility]    One-on-one coaching with a specially trained Medication Therapy Management (MTM) pharmacist: [phone] or [phone] (toll free)    Group classes with a health  to help you create a personal quit plan, including prescription quit aides if necessary: [employer] group classes: [phone]  American Cancer Society: 1-186.371.9844  The American Cancer Society can help you find local resources to quit smoking.     QUITPLAN: 8-986-078-PLAN (9651)  QUITPLAN Services offers a telephone helpline, gum, patches and lozenges. These services are free for the uninsured and those without coverage. The QUITPLAN online program is free to everyone at www.quitplan.com    American Lung Association: 4-344-CHEK-[place] (652-8678)  The American Lung Association offers a lung helpline as well as an online program, self-help book and group clinic support for quitting smoking at www.lung.org/stop-smoking    National Cancer Hundred: 0-984-442-QUIT (9050)  The National Cancer Hundred offers a telephone hotline, online text chat and a website with tools, information and support for smokers who want to quit at www.smokefree.gov    PROLIA  Risk Evaluation and Mitigation Strategy Best Practice Advisory    In May 2015, the FDA required an update to the PROLIA  (denosumab) REMS (Risk Evaluation and Mitigation Strategy) to inform both providers and patients about potential serious risks related to PROLIA .    These potential serious risks include:    Hypocalcemia    Osteonecrosis of the jaw    Atypical femoral fractures    Serious Infections    Dermatologic reactions    To ensure compliance with these requirements [facility] has developed a workflow for those patients who will receive PROLIA  at clinic.  This workflow includes insurance verification, patient scheduling, patient education, and documentation. Registered Nurses in the clinic should have completed eLearning related to the REMS and the clinic  workflow.  Refer to them to initiate this process.  This workflow does not need to be executed if the patient is to receive PROLIA  injection at [facility].       The  has put together a Patient Education Toolkit.  Copies of these handouts may be available your clinic. Patients must receive the Patient Brochure and Medication Guide when receiving injection at clinic.  These will be attached to the injection ordered from [employer] to the clinic. Links to handouts:  Patient Counseling Chart for Healthcare Providers  Patient Brochure  Prescribing Information  Medication Guide    If you are having trouble accessing the above links, these documents can be found at: [email or web address]-[email or web address]    The role of healthcare provider includes reviewing patient education materials with the patient, advising patients to seek medical attention if they have signs or symptoms of one of the serious risks, and provide patients a copy of the Patient Brochure and Medication Guide when receiving their injection.      Due to the risk of hypocalcemia the Prescribing Information for PROLIA  recommends that calcium and mineral levels (magnesium and phosphorus) be checked within 14 days of injection for those predisposed to hypocalcemia.              Follow-ups after your visit        Additional Services     DERMATOLOGY REFERRAL       Your provider has referred you to: [facility]: [facility] [phone]   [email or web address]    Please be aware that coverage of these services is subject to the terms and limitations of your health insurance plan.  Call member services at your health plan with any benefit or coverage questions.      Please bring the following with you to your appointment:    (1) Any X-Rays, CTs or MRIs which have been performed.  Contact the facility where they were done  to arrange for  prior to your scheduled appointment.  Any new CT, MRI or other procedures ordered by your specialist must be performed at a [place] facility or coordinated by your clinic's referral office.  (2) List of current medications  (3) This referral request   (4) Any documents/labs given to you for this referral                  Your next 10 appointments already scheduled     Aug 10, 2017  9:45 AM CDT   US XAVIER DOPPLER WITH EXERCISE with VUS2   [facility] ([facility])    [address]  [facility] [address]   [phone]           Please bring a list of your medicines (including vitamins, minerals and over-the-counter drugs). Also, tell your doctor about any allergies you may have. Wear comfortable clothes and leave your valuables at home.  No caffeine or tobacco for 1 hour prior to exam.  Please call the Imaging Department at your exam site with any questions.            Aug 10, 2017 10:15 AM CDT   US CAROTID BILATERAL with SHVUS2   [facility] ([facility])    [address]  [facility] [address]   [phone]           Please bring a list of your medicines (including vitamins, minerals and over-the-counter drugs). Also, tell your doctor about any allergies you may have. Wear comfortable clothes and leave your valuables at home.  You do not need to do anything special to prepare for your exam.  Please call the Imaging Department at your exam site with any questions.            Aug 10, 2017 11:00 AM CDT   Return Visit with Chadwick Lozano MD   [facility] ([facility])    [address]  [facility] [address]   [phone]              Future tests that were ordered for you today     Open Future Orders        Priority Expected Expires Ordered    DX Hip/Pelvis/Spine  Routine 7/20/2017 7/19/2018 7/20/2017    CT Chest Lung Cancer Scrn Low Dose wo Routine  7/20/2018 7/20/2017            Who to contact     If you have questions or need follow up information about today's clinic visit or your schedule please contact [facility] directly at [phone].  Normal or non-critical lab and imaging results will be communicated to you by MyChart, letter or phone within 4 business days after the clinic has received the results. If you do not hear from us within 7 days, please contact the clinic through [employer] or phone. If you have a critical or abnormal lab result, we will notify you by phone as soon as possible.  Submit refill requests through [employer] or call your pharmacy and they will forward the refill request to us. Please allow 3 business days for your refill to be completed.          Additional Information About Your Visit        [employer] Information     [employer] gives you secure access to your electronic health record. If you see a primary care provider, you can also send messages to your care team and make appointments. If you have questions, please call your primary care clinic.  If you do not have a primary care provider, please call [phone] and they will assist you.        Care EveryWhere ID     This is your Care EveryWhere ID. This could be used by other organizations to access your [place] medical records  [id number]        Your Vitals Were     Pulse Temperature Respirations Pulse Oximetry BMI (Body Mass Index)       55 98.3  F (36.8  C) (Oral) 16 100% 24.7 kg/m2        Blood Pressure from Last 3 Encounters:   07/20/17 130/80   03/28/17 128/68   01/16/17 108/58    Weight from Last 3 Encounters:   07/20/17 130 lb 11.2 oz (59.3 kg)   03/28/17 131 lb 6.4 oz (59.6 kg)   01/16/17 136 lb (61.7 kg)              We Performed the Following     DERMATOLOGY REFERRAL     Prof fee: Shared Decisionmaking for Lung Cancer Screening          Today's Medication  Changes          These changes are accurate as of: 7/20/17 10:32 AM.  If you have any questions, ask your nurse or doctor.               Start taking these medicines.        Dose/Directions    AIRBORNE Tbef   Used for:  Scurvy, Low iron   Replaces:  vitamin C-electrolytes 1000mg vitamin C super orange drink mix   Started by:  Eun Narvaez MD        Dose:  1 tablet   Take 1 tablet by mouth every morning INDICATION: VITAMIN C SUPPLEMENT   Quantity:  30 tablet   Refills:  11       ferrous sulfate Dried 140 (45 FE) MG Tbcr   Used for:  Scurvy, Low iron   Started by:  Eun Narvaez MD        Dose:  1 tablet   Take 1 tablet by mouth every morning (before breakfast) IRON SUPPLEMENT - PLEASE TAKE WITH 4 OZ OF OJ WITH PULP, SUBSTITUTION OF IRON SUPPLEMENT PERMITTED   Quantity:  180 tablet   Refills:  PRN       fluticasone-vilanterol 200-25 MCG/INH oral inhaler   Commonly known as:  BREO ELLIPTA   Used for:  Chronic obstructive pulmonary disease, unspecified COPD type (H), Tobacco use disorder   Started by:  Eun Narvaez MD        Dose:  1 puff   Inhale 1 puff into the lungs daily INDICATION: COPD   Quantity:  1 Inhaler   Refills:  3       loratadine 10 MG tablet   Commonly known as:  CLARITIN   Used for:  Chronic allergic rhinitis due to animal hair and dander   Started by:  Eun Narvaez MD        Dose:  10 mg   Take 1 tablet (10 mg) by mouth daily INDICATION: TO CONTROL ALLERGY SYMPTOMS   Quantity:  90 tablet   Refills:  3         These medicines have changed or have updated prescriptions.        Dose/Directions    atorvastatin 80 MG tablet   Commonly known as:  LIPITOR   This may have changed:  See the new instructions.   Used for:  PAD (peripheral artery disease) (H), Hyperlipidemia LDL goal <70   Changed by:  Eun Narvaez MD        TAKE ONE TABLET (80MG) BY MOUTH EVERY EVENING INDICATION:TO LOWER CHOLESTEROL AND TO HELP REDUCE RISK OF REOCURRENCE OF HEAR ATTACK STROKE,A   Quantity:  90  tablet   Refills:  2       cholecalciferol 2000 UNITS Caps   This may have changed:    - medication strength  - how much to take  - when to take this  - additional instructions   Used for:  Vitamin D deficiency   Changed by:  Eun Narvaez MD        Dose:  2 capsule   Take 2 capsules by mouth daily FOR VITAMIN D DEFICIENCY (LOW VITAMIN D)   Quantity:  100 capsule   Refills:  PRN       * denosumab 60 MG/ML Soln injection   Commonly known as:  PROLIA   This may have changed:  Another medication with the same name was added. Make sure you understand how and when to take each.   Used for:  Osteoporosis   Changed by:  Eun Narvaez MD        Dose:  60 mg   Inject 1 mL (60 mg) Subcutaneous every 6 months INDICATION: TO TREAT OSTEOPOROSIS   Quantity:  1 Syringe   Refills:  PRN       * denosumab 60 MG/ML Soln injection   Commonly known as:  PROLIA   This may have changed:  You were already taking a medication with the same name, and this prescription was added. Make sure you understand how and when to take each.   Used for:  Osteoporosis, unspecified osteoporosis type, unspecified pathological fracture presence   Changed by:  Eun Narvaez MD        Dose:  60 mg   Inject 1 mL (60 mg) Subcutaneous once for 1 dose   Quantity:  1 mL   Refills:  0       * Notice:  This list has 2 medication(s) that are the same as other medications prescribed for you. Read the directions carefully, and ask your doctor or other care provider to review them with you.      Stop taking these medicines if you haven't already. Please contact your care team if you have questions.     buPROPion 150 MG 24 hr tablet   Commonly known as:  WELLBUTRIN XL   Stopped by:  Eun Narvaez MD           nicotine 7 MG/24HR 24 hr patch   Commonly known as:  NICODERM CQ   Stopped by:  Eun Narvaez MD           nicotine polacrilex 4 MG lozenge   Commonly known as:  COMMIT   Stopped by:  Eun Narvaez MD           vitamin  C-electrolytes 1000mg vitamin C super orange drink mix   Commonly known as:  EMERGEN-C   Replaced by:  AIRBORNE Tbef   Stopped by:  Eun Narvaez MD                Where to get your medicines      These medications [id number] [place] [place] [facility] [address] [facility]  [address] [facility] [address]     Phone:  [phone]     AIRBORNE Tbef    albuterol 108 (90 BASE) MCG/ACT Inhaler    atorvastatin 80 MG tablet    cholecalciferol 2000 UNITS Caps    fluticasone-vilanterol 200-25 MCG/INH oral inhaler    loratadine 10 MG tablet    nitroGLYcerin 0.4 MG sublingual tablet         Some of these will need a paper prescription and others can be bought over the counter.  Ask your nurse if you have questions.     Bring a paper prescription for each of these medications     cyabnocobalamin 2500 MCG sublingual tablet    denosumab 60 MG/ML Soln injection    ferrous sulfate Dried 140 (45 FE) MG Tbcr                Primary Care Provider Office Phone # Fax #    Eun Narvaez [phone]       [facility] [address] [facility] [address]        Equal Access to Services     JERONIMO CRUMP AH: Hadii aad ku hadasho Soomaali, waaxda luqadaha, qaybta kaalmada adeegyada, waxay idiin hayaan carla cerda. So [facility] [phone].    ATENCIÓN: Si habla español, tiene a mahmood disposición servicios gratuitos de asistencia lingüística. Llame al [phone].    We comply with applicable federal civil rights laws and [place] laws. We do not discriminate on the basis of race, color, national origin, age, disability sex, sexual orientation or gender identity.            Thank you!     Thank you for choosing [facility]  for your care. Our goal is always to provide you with excellent care. Hearing back from our patients is one way we can continue to improve our services. Please take a few minutes to complete the written survey that you may  receive in the mail after your visit with us. Thank you!             Your Updated Medication List - Protect others around you: Learn how to safely use, store and throw away your medicines at www.disposemymeds.org.          This list is accurate as of: 7/20/17 10:32 AM.  Always use your most recent med list.                   Brand Name Dispense Instructions for use Diagnosis    [facility] Tbef     30 tablet    Take 1 tablet by mouth every morning INDICATION: VITAMIN C SUPPLEMENT    Scurvy, Low iron       albuterol 108 (90 BASE) MCG/ACT Inhaler    PROAIR HFA/PROVENTIL HFA/VENTOLIN HFA    1 Inhaler    Inhale 2 puffs into the lungs every 6 hours as needed for shortness of breath / dyspnea or wheezing    Chronic obstructive pulmonary disease, unspecified COPD type (H)       ASPIRIN EC PO      Take 81 mg by mouth daily        atorvastatin 80 MG tablet    LIPITOR    90 tablet    TAKE ONE TABLET (80MG) BY MOUTH EVERY EVENING INDICATION:TO LOWER CHOLESTEROL AND TO HELP REDUCE RISK OF REOCURRENCE OF HEAR ATTACK STROKE,A    PAD (peripheral artery disease) (H), Hyperlipidemia LDL goal <70       B COMPLEX 50 Tabs     100 tablet    Take 1 tablet by mouth every other day INDICATION: VITAMIN SUPPLEMENT    Neuropathy (H)       CALCIUM 600-D 600-400 MG-UNIT Tabs   Generic drug:  Calcium Carbonate-Vitamin D3     100 tablet    TAKE 1 TABLET BY MOUTH 2 TIMES DAILY FOR BONE HEALTH AND FOR VITAMIN D DEFICIENCY (LOW VITAMIN D)    Vitamin D deficiency       calcium-vitamin D 600-400 MG-UNIT per tablet    calcium 600 + D    100 tablet    Take 1 tablet by mouth 2 times daily FOR BONE HEALTH AND FOR VITAMIN D DEFICIENCY (LOW VITAMIN D)    Vitamin D deficiency       cholecalciferol 2000 UNITS Caps     100 capsule    Take 2 capsules by mouth daily FOR VITAMIN D DEFICIENCY (LOW VITAMIN D)    Vitamin D deficiency       clopidogrel 75 MG tablet    PLAVIX    90 tablet    TAKE ONE TABLET BY MOUTH ONE TIME DAILY    Peripheral vascular disease,  unspecified (H), History of TIA (transient ischemic attack) and stroke       cyabnocobalamin 2500 MCG sublingual tablet    VITAMIN B-12    100 tablet    Take 2,500 mcg by mouth daily INDICATION: FOR VITAMIN B12 SUPPLEMENTATION - TO IMPROVE MEMORY, BALANCE, SLEEP AND MOOD, DIRECTIONS: PLEASE PLACE UNDER THE TONGUE TO DISSOLVE    Neuropathy (H)       * denosumab 60 MG/ML Soln injection    PROLIA    1 Syringe    Inject 1 mL (60 mg) Subcutaneous every 6 months INDICATION: TO TREAT OSTEOPOROSIS    Osteoporosis       * denosumab 60 MG/ML Soln injection    PROLIA    1 mL    Inject 1 mL (60 mg) Subcutaneous once for 1 dose    Osteoporosis, unspecified osteoporosis type, unspecified pathological fracture presence       EQL B COMPLEX 50 Tabs     15 tablet    TAKE 1 TABLET BY MOUTH EVERY OTHER DAY    Neuropathy (H)       ezetimibe 10 MG tablet    ZETIA    90 tablet    TAKE 1 TABLET (10 MG) BY MOUTH DAILY INDICATION: TO LOWER CHOLESTEROL    PAD (peripheral artery disease) (H), Coronary artery disease involving native coronary artery of native heart without angina pectoris       ferrous sulfate Dried 140 (45 FE) MG Tbcr     180 tablet    Take 1 tablet by mouth every morning (before breakfast) IRON SUPPLEMENT - PLEASE TAKE WITH 4 OZ OF OJ WITH PULP, SUBSTITUTION OF IRON SUPPLEMENT PERMITTED    Scurvy, Low iron       fluticasone-vilanterol 200-25 MCG/INH oral inhaler    BREO ELLIPTA    1 Inhaler    Inhale 1 puff into the lungs daily INDICATION: COPD    Chronic obstructive pulmonary disease, unspecified COPD type (H), Tobacco use disorder       ISOSORBIDE DINITRATE PO      Take 30 mg by mouth every morning Verified with [facility] and patient med bottle as 30 mg Dinitrate daily.        lisinopril 40 MG tablet    PRINIVIL/ZESTRIL    90 tablet    Take 1 tablet (40 mg) by mouth daily INDICATION:TO LOWER BLOOD PRESSURE AND TO PRESERVE KIDNEY FUNCTION    PAD (peripheral artery disease) (H)       loratadine 10 MG tablet    CLARITIN    90 tablet     Take 1 tablet (10 mg) by mouth daily INDICATION: TO CONTROL ALLERGY SYMPTOMS    Chronic allergic rhinitis due to animal hair and dander       metoprolol 25 MG 24 hr tablet    TOPROL-XL    90 tablet    Take 0.5 tablets (12.5 mg) by mouth 2 times daily INDICATION:TO LOWER BLOOD PRESSURE AND TO HELP PREVENT HEART DISEASE    Essential hypertension, benign       nitroGLYcerin 0.4 MG sublingual tablet    NITROSTAT    15 tablet    Place 1 tablet (0.4 mg) under the tongue See Admin Instructions for chest pain    Personal history of MI (myocardial infarction)       OMEGA-3 FISH OIL PO      Take 2 capsules by mouth 2 times daily        omeprazole 20 MG CR capsule    priLOSEC    90 capsule    TAKE 1 CAPSULE (20 MG) BY MOUTH DAILY INDICATION: TO CONTROL REFLUX SYMPTOMS    Reflux esophagitis       order for DME     1 Device    Equipment being ordered: BP MACHINE WITH PULSE MONITOR    HTN (hypertension)       traMADol 50 MG tablet    ULTRAM    120 tablet    Take 1 tablet (50 mg) by mouth every 6 hours as needed for moderate pain    Primary osteoarthritis involving multiple joints       TYLENOL PO      Take 500-1,000 mg by mouth every 6 hours as needed for mild pain or fever        * Notice:  This list has 2 medication(s) that are the same as other medications prescribed for you. Read the directions carefully, and ask your doctor or other care provider to review them with you.

## 2017-07-20 NOTE — PATIENT INSTRUCTIONS
** FOLLOW UP PLAN**:    PLEASE SCHEDULE FASTING LABS WITH OFFICE VISIT 6 MONTHS FROM TODAY TO FOLLOW UP ON  Blood pressure, thyroid, vascular issues, and to review your test results     BE SURE TO SCHEDULE YOUR LAB DRAW APPOINTMENT FOR AT LEAST 5 DAYS BEFORE YOUR NEXT VISIT      YOU HAVE BEEN REFERRED FOR bone density scan, to dermatology and for CT scan of the lungs to screen for lung cancer   PLEASE  MAKE SURE TO SCHEDULE BONE DENSITY APPOINTMENT(S) AT THE   OR BY CALLING THE NUMBER BELOW AND  the rest of your APPOINTMENT(S) BY TELEPHONE      YOU MAY CONTACT THE CLINIC IF ANY QUESTIONS OR CONCERNS -[phone] OR VIA Entrec         Lung Cancer Screening   Frequently Asked Questions  If you are at high-risk for lung cancer, getting screened with low-dose computed tomography (LDCT) every year can help save your life. This handout offers answers to some of the most common questions about lung cancer screening. If you have other questions, please call [phone] ([phone]).     What is it?  Lung cancer screening uses special X-ray technology to create an image of your lung tissue. The exam is quick and easy and takes less than 10 seconds. We don t give you any medicine or use any needles. You can eat before and after the exam. You don t need to change your clothes as long as the clothing on your chest doesn t contain metal. But, you do need to be able to hold your breath for at least 6 seconds during the exam.    What is the goal of lung cancer screening?  The goal of lung cancer screening is to save lives. Many times, lung cancer is not found until a person starts having physical symptoms. Lung cancer screening can help detect lung cancer in the earliest stages when it may be easier to treat.    Who should be screened for lung cancer?  We suggest lung cancer screening for anyone who is at high-risk for lung cancer. You are in the high-risk group if you:      are between the ages of 55 and  79, and    have smoked at least 1 pack of cigarettes a day for 30 or more years, and    still smoke or have quit within the past 15 years.    However, if you have a new cough or shortness of breath, you should talk to your doctor before being screened.    Some national lung health advocacy groups also recommend screening for people ages 50 to 79 who have smoked an average of 1 pack of cigarettes a day for 20 years. They must also have at least 1 other risk factor for lung cancer, not including exposure to secondhand smoke. Other risk factors are having had cancer in the past, emphysema, pulmonary fibrosis, COPD, a family history of lung cancer, or exposure to certain materials such as arsenic, asbestos, beryllium, cadmium, chromium, diesel fumes, nickel, radon or silica. Your care team can help you know if you have one of these risk factors.     Why does it matter if I have symptoms?  Certain symptoms can be a sign that you have a condition in your lungs that should be checked and treated by your doctor. These symptoms include fever, chest pain, a new or changing cough, shortness of breath that you have never felt before, coughing up blood or unexplained weight loss. Having any of these symptoms can greatly affect the results of lung cancer screening.       Should all smokers get an LDCT lung cancer screening exam?  It depends. Lung cancer screening is for a very specific group of men and women who have a history of heavy smoking over a long period of time (see  Who should be screened for lung cancer  above).  I am in the high-risk group, but have been diagnosed with cancer in the past. Is LDCT lung cancer screening right for me?  In some cases, you should not have LDCT lung screening, such as when your doctor is already following your cancer with CT scan studies. Your doctor will help you decide if LDCT lung screening is right for you.  Do I need to have a screening exam every year?  Yes. If you are in the  high-risk group described earlier, you should get an LDCT lung cancer screening exam every year until you are 79, or are no longer willing or able to undergo screening and possible procedures to diagnose and treat lung cancer.  How effective is LDCT at preventing death from lung cancer?  Studies have shown that LDCT lung cancer screening can lower the risk of death from lung cancer by 20 percent in people who are at high-risk.  What are the risks?  There are some risks and limitations of LDCT lung cancer screening. We want to make sure you understand the risks and benefits, so please let us know if you have any questions. Your doctor may want to talk with you more about these risks.    Radiation exposure: As with any exam that uses radiation, there is a very small increased risk of cancer. The amount of radiation in LDCT is small--about the same amount a person would get from a mammogram. Your doctor orders the exam when he or she feels the potential benefits outweigh the risks.    False negatives: No test is perfect, including LDCT. It is possible that you may have a medical condition, including lung cancer, that is not found during your exam. This is called a false negative result.    False positives and more testing: LDCT very often finds something in the lung that could be cancer, but in fact is not. This is called a false positive result. False positive tests often cause anxiety. To make sure these findings are not cancer, you may need to have more tests. These tests will be done only if you give us permission. Sometimes patients need a treatment that can have side effects, such as a biopsy. For more information on false positives, see  What can I expect from the results?     Findings not related to lung cancer: Your LDCT exam also takes pictures of areas of your body next to your lungs. In a very small number of cases, the CT scan will show an abnormal finding in one of these areas, such as your kidneys,  adrenal glands, liver or thyroid. This finding may not be serious, but you may need more tests. Your doctor can help you decide what other tests you may need, if any.  What can I expect from the results?  About 1 out of 4 LDCT exams will find something that may need more tests. Most of the time, these findings are lung nodules. Lung nodules are very small collections of tissue in the lung. These nodules are very common, and the vast majority--more than 97 percent--are not cancer (benign). Most are normal lymph nodes or small areas of scarring from past infections.  But, if a small lung nodule is found to be cancer, the cancer can be cured more than 90 percent of the time. To know if the nodule is cancer, we may need to get more images before your next yearly screening exam. If the nodule has suspicious features (for example, it is large, has an odd shape or grows over time), we will refer you to a specialist for further testing.  Will my doctor also get the results?  Yes. Your doctor will get a copy of your results.  Is it okay to keep smoking now that there s a cancer screening exam?  No. Tobacco is one of the strongest cancer-causing agents. It causes not only lung cancer, but other cancers and cardiovascular (heart) diseases as well. The damage caused by smoking builds over time. This means that the longer you smoke, the higher your risk of disease. While it is never too late to quit, the sooner you quit, the better.  Where can I find help to quit smoking?  The best way to prevent lung cancer is to stop smoking. If you have already quit smoking, congratulations and keep it up! For help on quitting smoking, please call QUITPLAN at 9-722-650-YDNX (6060) or the American Cancer Society at 1-204.306.6350 to find local resources near you.  One-on-one health coaching:  If you d prefer to work individually with a health care provider on tobacco cessation, we offer:      Medication Therapy Management:  Our specially  trained pharmacists work closely with you and your doctor to help you quit smoking.  Call [phone] or [phone] (toll free).     Can Do: Health coaching offered by [facility].  www.can-do-health.com    Resources to Help You Quit Smoking  If you have quit smoking or are thinking about quitting, congratulations! It can be hard to quit smoking, but the benefits are well worth it. To help you quit and stay smoke-free, there are many resources that can help.   Your health plan  If you have health insurance, call them for more details about their phone coaching programs.    [employer] and [facility]: 1-887-365-BLUE     [employer]: 8-574-919-QUIT     [facility]: 0-636-757-BLUE     [facility]: [phone]     [facility]: [phone]     [facility] members: [phone]     [facility]: [phone]     [facility] [place]: [phone]     [facility]: [phone]     [place] and [facility]    One-on-one coaching with a specially trained Medication Therapy Management (MTM) pharmacist: [phone] or [phone] (toll free)    Group classes with a health  to help you create a personal quit plan, including prescription quit aides if necessary: [employer] group classes: [phone]  American Cancer Society: 1-422.940.6843  The American Cancer Society can help you find local resources to quit smoking.     QUITPLAN: 5-896-639-PLAN (9504)  QUITPLAN Services offers a telephone helpline, gum, patches and lozenges. These services are free for the uninsured and those without coverage. The QUITPLAN online program is free to everyone at www.quitplan.com    American Lung Association: 8-780-UTNJ-USA (657-7076)  The American Lung Association offers a lung helpline as well as an online program, self-help book and group clinic support for quitting smoking at  www.lung.org/stop-smoking    National Cancer [place]: 3-775-018-QUIT (7843)  The National Cancer [place] offers a telephone hotline, online text chat and a website with tools, information and support for smokers who want to quit at www.smokefree.gov    PROLIA  Risk Evaluation and Mitigation Strategy Best Practice Advisory    In May 2015, the FDA required an update to the PROLIA  (denosumab) REMS (Risk Evaluation and Mitigation Strategy) to inform both providers and patients about potential serious risks related to PROLIA .    These potential serious risks include:    Hypocalcemia    Osteonecrosis of the jaw    Atypical femoral fractures    Serious Infections    Dermatologic reactions    To ensure compliance with these requirements [facility] has developed a workflow for those patients who will receive PROLIA  at clinic.  This workflow includes insurance verification, patient scheduling, patient education, and documentation. Registered Nurses in the clinic should have completed eLearning related to the REMS and the clinic workflow.  Refer to them to initiate this process.  This workflow does not need to be executed if the patient is to receive PROLIA  injection at [facility].       The  has put together a Patient Education Toolkit.  Copies of these handouts may be available your clinic. Patients must receive the Patient Brochure and Medication Guide when receiving injection at clinic.  These will be attached to the injection ordered from [facility] [employer] to the clinic. Links to handouts:  Patient Counseling Chart for Healthcare Providers  Patient Brochure  Prescribing Information  Medication Guide    If you are having trouble accessing the above links, these documents can be found at: [email or web address]    The role of healthcare provider includes reviewing patient education materials with the patient, advising  patients to seek medical attention if they have signs or symptoms of one of the serious risks, and provide patients a copy of the Patient Brochure and Medication Guide when receiving their injection.      Due to the risk of hypocalcemia the Prescribing Information for PROLIA  recommends that calcium and mineral levels (magnesium and phosphorus) be checked within 14 days of injection for those predisposed to hypocalcemia.

## 2017-07-24 ENCOUNTER — TELEPHONE (OUTPATIENT)
Dept: INTERNAL MEDICINE | Facility: CLINIC | Age: 57
End: 2017-07-24

## 2017-07-24 NOTE — TELEPHONE ENCOUNTER
Prior authorization    Medication name vitamin b12  Insurance   Insurance ID number [id number]  Prior authorization faxed through cover my meds

## 2017-07-26 ENCOUNTER — ALLIED HEALTH/NURSE VISIT (OUTPATIENT)
Dept: NURSING | Facility: CLINIC | Age: 57
End: 2017-07-26
Payer: COMMERCIAL

## 2017-07-26 DIAGNOSIS — M81.0 OSTEOPOROSIS, UNSPECIFIED OSTEOPOROSIS TYPE, UNSPECIFIED PATHOLOGICAL FRACTURE PRESENCE: ICD-10-CM

## 2017-07-26 PROCEDURE — 96372 THER/PROPH/DIAG INJ SC/IM: CPT

## 2017-08-09 ENCOUNTER — HOSPITAL ENCOUNTER (OUTPATIENT)
Dept: BONE DENSITY | Facility: CLINIC | Age: 57
Discharge: HOME | End: 2017-08-09
Attending: INTERNAL MEDICINE | Admitting: INTERNAL MEDICINE
Payer: COMMERCIAL

## 2017-08-09 ENCOUNTER — HOSPITAL ENCOUNTER (OUTPATIENT)
Dept: MAMMOGRAPHY | Facility: CLINIC | Age: 57
End: 2017-08-09
Attending: INTERNAL MEDICINE
Payer: COMMERCIAL

## 2017-08-09 DIAGNOSIS — M81.0 OSTEOPOROSIS, UNSPECIFIED OSTEOPOROSIS TYPE, UNSPECIFIED PATHOLOGICAL FRACTURE PRESENCE: ICD-10-CM

## 2017-08-09 DIAGNOSIS — Z12.31 VISIT FOR SCREENING MAMMOGRAM: ICD-10-CM

## 2017-08-09 PROCEDURE — G0202 SCR MAMMO BI INCL CAD: HCPCS

## 2017-08-09 PROCEDURE — 77080 DXA BONE DENSITY AXIAL: CPT

## 2017-08-10 ENCOUNTER — HOSPITAL ENCOUNTER (OUTPATIENT)
Dept: ULTRASOUND IMAGING | Facility: CLINIC | Age: 57
Discharge: HOME | End: 2017-08-10
Attending: SURGERY | Admitting: SURGERY
Payer: COMMERCIAL

## 2017-08-10 ENCOUNTER — OFFICE VISIT (OUTPATIENT)
Dept: OTHER | Facility: CLINIC | Age: 57
End: 2017-08-10
Attending: SURGERY
Payer: COMMERCIAL

## 2017-08-10 ENCOUNTER — HOSPITAL ENCOUNTER (OUTPATIENT)
Dept: ULTRASOUND IMAGING | Facility: CLINIC | Age: 57
End: 2017-08-10
Attending: SURGERY
Payer: COMMERCIAL

## 2017-08-10 VITALS — SYSTOLIC BLOOD PRESSURE: 117 MMHG | DIASTOLIC BLOOD PRESSURE: 68 MMHG | HEART RATE: 58 BPM

## 2017-08-10 DIAGNOSIS — I73.9 PAD (PERIPHERAL ARTERY DISEASE) (H): Primary | ICD-10-CM

## 2017-08-10 DIAGNOSIS — Z98.890 S/P CAROTID ENDARTERECTOMY: ICD-10-CM

## 2017-08-10 DIAGNOSIS — I73.9 PAD (PERIPHERAL ARTERY DISEASE) (H): ICD-10-CM

## 2017-08-10 DIAGNOSIS — I65.21 CAROTID STENOSIS, RIGHT: ICD-10-CM

## 2017-08-10 DIAGNOSIS — Z48.812 POSTOP CAROTID ENDARTERECTOMY SURVEILLANCE, ENCOUNTER FOR: ICD-10-CM

## 2017-08-10 PROCEDURE — 93926 LOWER EXTREMITY STUDY: CPT | Mod: LT

## 2017-08-10 PROCEDURE — 93924 LWR XTR VASC STDY BILAT: CPT

## 2017-08-10 PROCEDURE — 99211 OFF/OP EST MAY X REQ PHY/QHP: CPT

## 2017-08-10 PROCEDURE — 93880 EXTRACRANIAL BILAT STUDY: CPT

## 2017-08-10 PROCEDURE — 99214 OFFICE O/P EST MOD 30 MIN: CPT | Mod: ZP | Performed by: SURGERY

## 2017-08-10 NOTE — MR AVS SNAPSHOT
After Visit Summary   8/10/2017    Shirley Hendricks    MRN: [id number]           Patient Information     Date Of Birth          1960        Visit Information        Provider Department      8/10/2017 11:00 AM Chadwick Lozano MD [facility]      Today's Diagnoses     PAD (peripheral artery disease) (H)    -  1    Carotid stenosis, right        Postop carotid endarterectomy surveillance, encounter for           Follow-ups after your visit        Follow-up notes from your care team     Return in about 6 months (around 2/10/2018).      Your next 10 appointments already scheduled     Aug 11, 2017 11:15 AM CDT   CT LUNG RESEARCH ALVARADO with SHCT1   [facility] CT ([facility])    [address] [facility] [id number] [phone]           Please bring any scans or X-rays taken at other hospitals, if similar tests were done. Also bring a list of your medicines, including vitamins, minerals and over-the-counter drugs. It is safest to leave personal items at home.  Be sure to tell your doctor:   If you have any allergies.   If there s any chance you are pregnant.   If you are breastfeeding.   If you have any special needs.  You do not need to do anything special to prepare.  Please wear loose clothing, such as a sweat suit or jogging clothes. Avoid snaps, zippers and other metal. We may ask you to undress and put on a hospital gown.            Feb 15, 2018  9:45 AM CST   US LOWER EXTREMITY ARTERIAL DUPLEX LEFT with VUS2   [facility] ([facility])    [address]  [address]  [facility] [address]   [phone]           Please bring a list of your medicines (including vitamins, minerals and over-the-counter drugs). Also, tell your doctor about any allergies you may have. Wear comfortable clothes and leave your valuables at home.  You  do not need to do anything special to prepare for your exam.  Please call the Imaging Department at your exam site with any questions.            Feb 15, 2018 10:30 AM CST   US AORTA/IVC/ILIAC DUPLEX COMPLETE with SHVUS2   [facility]ound ([facility])    [address]  [place] [address]   [phone]           Please bring a list of your medicines (including vitamins, minerals and over-the-counter drugs). Also, tell your doctor about any allergies you may have. Wear comfortable clothes and leave your valuables at home.  Adults: No eating or drinking for 8 hours before the exam. You may take medicine with a small sip of water.  Children: - Children 6+ years: No food or drink for 6 hours before exam. - Children 1-5 years: No food or drink for 4 hours before exam. - Infants, breast-fed: may have breast milk up to 2 hours before exam. - Infants, formula: may have bottle until 4 hours before exam.  Please call the Imaging Department at your exam site with any questions.            Feb 15, 2018 11:30 AM CST   Return Visit with Chadwick Lozano MD   [facility] ([facility])    [address]  [place] [id number] [phone]              Future tests that were ordered for you today     Open Future Orders        Priority Expected Expires Ordered    US Lower Extremity Arterial Duplex Left Routine 8/10/2017 8/10/2018 8/10/2017            Who to contact     If you have questions or need follow up information about today's clinic visit or your schedule please contact [facility] directly at [phone].  Normal or non-critical lab and imaging results will be communicated to you by MyChart, letter or phone within 4 business days after the clinic has received the results. If you do not hear from us within 7 days, please contact the clinic through  [email or web address] or phone. If you have a critical or abnormal lab result, we will notify you by phone as soon as possible.  Submit refill requests through [facility] or call your pharmacy and they will forward the refill request to us. Please allow 3 business days for your refill to be completed.          Additional Information About Your Visit        [email or web address] Information     [facility] gives you secure access to your electronic health record. If you see a primary care provider, you can also send messages to your care team and make appointments. If you have questions, please call your primary care clinic.  If you do not have a primary care provider, please call [phone] and they will assist you.        Care EveryWhere ID     This is your Care EveryWhere ID. This could be used by other organizations to access your [place] medical records  [id number]        Your Vitals Were     Pulse                   58            Blood Pressure from Last 3 Encounters:   08/10/17 117/68   07/20/17 130/80   03/28/17 128/68    Weight from Last 3 Encounters:   07/20/17 130 lb 11.2 oz (59.3 kg)   03/28/17 131 lb 6.4 oz (59.6 kg)   01/16/17 136 lb (61.7 kg)              Today, you had the following     No orders found for display       Primary Care Provider Office Phone # Fax #    Eun Narvaez [phone]       [address] [facility] [address]        Equal Access to Services     [facility] : Hadii adela ku hadasho Sokelsieali, waaxda luqadaha, qaybta kaalmada carlayada, alesia dowd . So [facility] [phone].    ATENCIÓN: Si habla español, tiene a mahmood disposición servicios gratuitos de asistencia lingüística. Llame al [phone].    We comply with applicable federal civil rights laws and [place] laws. We do not discriminate on the basis of race, color, national origin, age, disability sex, sexual orientation or gender identity.            Thank you!     [facility]  [facility]. Our goal is always to provide you with excellent care. Hearing back from our patients is one way we can continue to improve our services. Please take a few minutes to complete the written survey that you may receive in the mail after your visit with us. Thank you!             Your Updated Medication List - Protect others around you: Learn how to safely use, store and throw away your medicines at www.disposemymeds.org.          This list is accurate as of: 8/10/17 12:46 PM.  Always use your most recent med list.                   Brand Name Dispense Instructions for use Diagnosis    AIRBORNE Tbef     30 tablet    Take 1 tablet by mouth every morning INDICATION: VITAMIN C SUPPLEMENT    Scurvy, Low iron       albuterol 108 (90 BASE) MCG/ACT Inhaler    PROAIR HFA/PROVENTIL HFA/VENTOLIN HFA    1 Inhaler    Inhale 2 puffs into the lungs every 6 hours as needed for shortness of breath / dyspnea or wheezing    Chronic obstructive pulmonary disease, unspecified COPD type (H)       ASPIRIN EC PO      Take 81 mg by mouth daily        atorvastatin 80 MG tablet    LIPITOR    90 tablet    TAKE ONE TABLET (80MG) BY MOUTH EVERY EVENING INDICATION:TO LOWER CHOLESTEROL AND TO HELP REDUCE RISK OF REOCURRENCE OF HEAR ATTACK STROKE,A    PAD (peripheral artery disease) (H), Hyperlipidemia LDL goal <70       B COMPLEX 50 Tabs     100 tablet    Take 1 tablet by mouth every other day INDICATION: VITAMIN SUPPLEMENT    Neuropathy (H)       CALCIUM 600-D 600-400 MG-UNIT Tabs   Generic drug:  Calcium Carbonate-Vitamin D3     100 tablet    TAKE 1 TABLET BY MOUTH 2 TIMES DAILY FOR BONE HEALTH AND FOR VITAMIN D DEFICIENCY (LOW VITAMIN D)    Vitamin D deficiency       calcium-vitamin D 600-400 MG-UNIT per tablet    calcium 600 + D    100 tablet    Take 1 tablet by mouth 2 times daily FOR BONE HEALTH AND FOR VITAMIN D DEFICIENCY (LOW VITAMIN D)    Vitamin D deficiency       cholecalciferol 2000 UNITS Caps     100  capsule    Take 2 capsules by mouth daily FOR VITAMIN D DEFICIENCY (LOW VITAMIN D)    Vitamin D deficiency       clopidogrel 75 MG tablet    PLAVIX    90 tablet    TAKE ONE TABLET BY MOUTH ONE TIME DAILY    Peripheral vascular disease, unspecified (H), History of TIA (transient ischemic attack) and stroke       cyabnocobalamin 2500 MCG sublingual tablet    VITAMIN B-12    100 tablet    Take 2,500 mcg by mouth daily INDICATION: FOR VITAMIN B12 SUPPLEMENTATION - TO IMPROVE MEMORY, BALANCE, SLEEP AND MOOD, DIRECTIONS: PLEASE PLACE UNDER THE TONGUE TO DISSOLVE    Neuropathy (H)       denosumab 60 MG/ML Soln injection    PROLIA    1 Syringe    Inject 1 mL (60 mg) Subcutaneous every 6 months INDICATION: TO TREAT OSTEOPOROSIS    Osteoporosis       EQL B COMPLEX 50 Tabs     15 tablet    TAKE 1 TABLET BY MOUTH EVERY OTHER DAY    Neuropathy (H)       ezetimibe 10 MG tablet    ZETIA    90 tablet    TAKE 1 TABLET (10 MG) BY MOUTH DAILY INDICATION: TO LOWER CHOLESTEROL    PAD (peripheral artery disease) (H), Coronary artery disease involving native coronary artery of native heart without angina pectoris       ferrous sulfate Dried 140 (45 FE) MG Tbcr     180 tablet    Take 1 tablet by mouth every morning (before breakfast) IRON SUPPLEMENT - PLEASE TAKE WITH 4 OZ OF OJ WITH PULP, SUBSTITUTION OF IRON SUPPLEMENT PERMITTED    Scurvy, Low iron       fluticasone-vilanterol 200-25 MCG/INH oral inhaler    BREO ELLIPTA    1 Inhaler    Inhale 1 puff into the lungs daily INDICATION: COPD    Chronic obstructive pulmonary disease, unspecified COPD type (H), Tobacco use disorder       ISOSORBIDE DINITRATE PO      Take 30 mg by mouth every morning Verified with Cub and patient med bottle as 30 mg Dinitrate daily.        lisinopril 40 MG tablet    PRINIVIL/ZESTRIL    90 tablet    Take 1 tablet (40 mg) by mouth daily INDICATION:TO LOWER BLOOD PRESSURE AND TO PRESERVE KIDNEY FUNCTION    PAD (peripheral artery disease) (H)       loratadine 10 MG  tablet    CLARITIN    90 tablet    Take 1 tablet (10 mg) by mouth daily INDICATION: TO CONTROL ALLERGY SYMPTOMS    Chronic allergic rhinitis due to animal hair and dander       metoprolol 25 MG 24 hr tablet    TOPROL-XL    90 tablet    Take 0.5 tablets (12.5 mg) by mouth 2 times daily INDICATION:TO LOWER BLOOD PRESSURE AND TO HELP PREVENT HEART DISEASE    Essential hypertension, benign       nitroGLYcerin 0.4 MG sublingual tablet    NITROSTAT    15 tablet    Place 1 tablet (0.4 mg) under the tongue See Admin Instructions for chest pain    Personal history of MI (myocardial infarction)       OMEGA-3 FISH OIL PO      Take 2 capsules by mouth 2 times daily        omeprazole 20 MG CR capsule    priLOSEC    90 capsule    TAKE 1 CAPSULE (20 MG) BY MOUTH DAILY INDICATION: TO CONTROL REFLUX SYMPTOMS    Reflux esophagitis       order for DME     1 Device    Equipment being ordered: BP MACHINE WITH PULSE MONITOR    HTN (hypertension)       traMADol 50 MG tablet    ULTRAM    120 tablet    Take 1 tablet (50 mg) by mouth every 6 hours as needed for moderate pain    Primary osteoarthritis involving multiple joints       TYLENOL PO      Take 500-1,000 mg by mouth every 6 hours as needed for mild pain or fever

## 2017-08-10 NOTE — LETTER
[facility]  [address]  [place] [address]  Phone: [phone]  Fax: [phone]          August 10, 2017    [facility]      RE:  Shirley Hendricks-:  [birth year]     Shirley Hendricks returns to see me in the office today with her .  She had undergone an aortobifemoral bypass graft in  along with a left femoral to above-knee popliteal in situ bypass graft on 3/17/2010.  Previously did this in  and  for iliac stents placed.     She developed acute occlusion right superficial femoral artery related to a angiogram for her carotid disease.  Despite this she has no disabling claudication symptoms in her right leg..  Her left leg has done very well upon the bypass graft.     She underwent a right CEA on 2016 for critical stenosis.  She has a stable moderate stenosis of the left which is asymptomatic.     PMH:  Medications: Lipitor, aspirin, Plavix, Zetia, Toprol-XL, lisinopril, Prilosec, Inhalers  Medical:  Hypertension under good control medications, PAD, COPD with   No significant breathing problems.  Hyperlipidemia on  medications.  GERD.  History of bile myocardial infarction presently asymptomatic.  Former smoker.    General  live independently.     Exam: Alert and appropriate.  Blood pressure 117/68.  Pulse 58.  Normal affect.  Well-healed right carotid incision with soft carotid bruits.  Chest= clear   Cardiovascular=RR  Well-healed abdominal-groin-left thigh incisions.  No distal edema  +3 femoral pulses bilaterally and a +3 left graft pulse.     Ankle brachial index is 0.59 on the right with biphasic waveforms increasing to 0.55 with exercise.  She had some pain in her right leg requiring her to stop at four minutes on the treadmill.  Left ankle-brachial index is 1.04 with triphasic waveforms decreasing slightly to 0.88 with exercise and no symptoms.    Left femoropopliteal bypass graft is widely patent with  good flow.     Right carotid endarterectomy is patent with very mild wall thickening.  IC PSV= 119 cm/s Stable calcified moderate stenosis of the left carotid bifurcation with an IC PSV= 199 cm/s which is unchanged.     Impression:  #1  Patent aortobifemoral bypass graft.  Patent left femoral to above-knee popliteal in situ bypass graft.   On dual antiplatelet therapy that should be continued.  Nonsmoker. Reports good control of her LDL on her medications.     #2   Occluded right superficial femoral artery.  She does have collaterals.  She did notice discomfort on the treadmill with a decreased XAVIER but can do all of her normal activities with no disabling claudication and thus no intervention is indicated at this time. We'll repeat duplex of left graft and XAVIER in six months.     #2  Patent right CEA with stable moderately severe left carotid stenosis.  Follow-up duplex in one year.     Chadwick Lozano MD

## 2017-08-10 NOTE — PROGRESS NOTES
[place] [facility]    Shirley Hendricks Return to see me in the office today with her .  She had undergone an aortobifemoral bypass graft in 2010 along with a left femoral to above-knee popliteal in situ bypass graft on 3/17/2010.  Previously did this in 2009 and 2000 for iliac stents placed.    She developed acute occlusion right superficial femoral artery related to a angiogram for her carotid disease.  Despite this she has no disabling claudication symptoms in her right leg..  Her left leg has done very well upon the bypass graft.    She underwent a right CEA on 5/11/2016 for critical stenosis.  She has a stable moderate stenosis of the left which is asymptomatic.    PMH:  Medications: Lipitor, aspirin, Plavix, Zetia, Toprol-XL, lisinopril, Prilosec,                                      Inhalers             Medical:  Hypertension under good control medications, PAD, COPD with                              No significant breathing problems.  Hyperlipidemia on                                                                    medications.  GERD.                             History of bile myocardial infarction presently asymptomatic.              Former smoker.    General  live independently.    Exam: Alert and appropriate.  Blood pressure 117/68.  Pulse 58.  Normal affect.               Well-healed right carotid incision with soft carotid bruits.               Chest= clear   Cardiovascular=RR               Well-healed abdominal-groin-left thigh incisions.  No distal edema                +3 femoral pulses bilaterally and a +3 left graft pulse.      Ankle brachial index is 0.59 on the right with biphasic waveforms increasing to 0.55 with exercise.  She had some pain in her right leg requiring her to stop at four minutes on the treadmill.   Left ankle-brachial index is 1.04 with triphasic waveforms decreasing slightly to 0.88 with exercise and no symptoms.      Left femoropopliteal bypass  graft is widely patent with good flow.        Right carotid endarterectomy is patent with very mild wall thickening.  IC PSV= 119 cm/s    Stable calcified moderate stenosis of the left carotid bifurcation with an IC PSV= 199 cm/s which is unchanged.        Impression:  #1  Patent aortobifemoral bypass graft.  Patent left femoral to above-knee popliteal in situ bypass graft.   On dual antiplatelet therapy that should be continued.  Nonsmoker.  Reports good control of her LDL on her medications.                         #2   Occluded right superficial femoral artery.  She does have collaterals.  She did notice discomfort on the treadmill with a decreased XAVIER but can do all of her normal activities with no disabling claudication and thus no intervention is indicated at this time. We'll repeat duplex of left graft and XAVIER in six months.                        #2  Patent right CEA with stable moderately severe left carotid stenosis.  Follow-up duplex in one year.     Chadwick Lozano MD       Please route or send letter to:  Primary Care Provider (PCP)

## 2017-08-10 NOTE — NURSING NOTE
"Chief Complaint   Patient presents with     RECHECK     1 year F/U, Hx, femoral to popliteal insitu bypass insitu bypass graft & R carotid endarterectomy for critical stenosis on 05/26/2016       Initial /68 (BP Location: Right arm, Patient Position: Chair, Cuff Size: Adult Regular)  Pulse 58 Estimated body mass index is 24.7 kg/(m^2) as calculated from the following:    Height as of 3/28/17: 5' 1\" (1.549 m).    Weight as of 7/20/17: 130 lb 11.2 oz (59.3 kg).  Medication Reconciliation: complete    Face to Face time: 5 minutes    Marleen Gupta MA    "

## 2017-08-11 ENCOUNTER — HOSPITAL ENCOUNTER (OUTPATIENT)
Dept: CT IMAGING | Facility: CLINIC | Age: 57
Discharge: HOME | End: 2017-08-11
Attending: INTERNAL MEDICINE | Admitting: INTERNAL MEDICINE
Payer: COMMERCIAL

## 2017-08-11 DIAGNOSIS — F17.200 CURRENT SMOKER: ICD-10-CM

## 2017-08-11 DIAGNOSIS — F17.299 OTHER TOBACCO PRODUCT NICOTINE DEPENDENCE WITH NICOTINE-INDUCED DISORDER: ICD-10-CM

## 2017-08-11 DIAGNOSIS — Z53.9 ERRONEOUS ENCOUNTER--DISREGARD: Primary | ICD-10-CM

## 2017-08-11 DIAGNOSIS — J44.9 CHRONIC OBSTRUCTIVE PULMONARY DISEASE, UNSPECIFIED COPD TYPE (H): ICD-10-CM

## 2017-08-11 DIAGNOSIS — F17.200 TOBACCO USE DISORDER: ICD-10-CM

## 2017-08-11 PROCEDURE — G0297 LDCT FOR LUNG CA SCREEN: HCPCS

## 2017-08-14 ENCOUNTER — OFFICE VISIT (OUTPATIENT)
Dept: URGENT CARE | Facility: URGENT CARE | Age: 57
End: 2017-08-14
Payer: COMMERCIAL

## 2017-08-14 ENCOUNTER — TELEPHONE (OUTPATIENT)
Dept: URGENT CARE | Facility: URGENT CARE | Age: 57
End: 2017-08-14

## 2017-08-14 ENCOUNTER — RADIANT APPOINTMENT (OUTPATIENT)
Dept: GENERAL RADIOLOGY | Facility: CLINIC | Age: 57
End: 2017-08-14
Attending: PHYSICIAN ASSISTANT
Payer: COMMERCIAL

## 2017-08-14 VITALS
WEIGHT: 131.2 LBS | HEART RATE: 57 BPM | BODY MASS INDEX: 24.79 KG/M2 | OXYGEN SATURATION: 98 % | SYSTOLIC BLOOD PRESSURE: 130 MMHG | DIASTOLIC BLOOD PRESSURE: 60 MMHG | TEMPERATURE: 97.9 F

## 2017-08-14 DIAGNOSIS — R07.81 RIB PAIN ON RIGHT SIDE: ICD-10-CM

## 2017-08-14 DIAGNOSIS — R10.9 RIGHT-SIDED ABDOMINAL PAIN OF UNKNOWN CAUSE: ICD-10-CM

## 2017-08-14 DIAGNOSIS — I73.9 PAD (PERIPHERAL ARTERY DISEASE) (H): Primary | ICD-10-CM

## 2017-08-14 DIAGNOSIS — R07.81 RIB PAIN ON RIGHT SIDE: Primary | ICD-10-CM

## 2017-08-14 LAB
ALBUMIN SERPL-MCNC: 3.8 G/DL (ref 3.4–5)
ALP SERPL-CCNC: 66 U/L (ref 40–150)
ALT SERPL W P-5'-P-CCNC: 37 U/L (ref 0–50)
AMYLASE SERPL-CCNC: 46 U/L (ref 30–110)
ANION GAP SERPL CALCULATED.3IONS-SCNC: 3 MMOL/L (ref 3–14)
AST SERPL W P-5'-P-CCNC: 22 U/L (ref 0–45)
BASOPHILS # BLD AUTO: 0.1 10E9/L (ref 0–0.2)
BASOPHILS NFR BLD AUTO: 0.9 %
BILIRUB SERPL-MCNC: 0.3 MG/DL (ref 0.2–1.3)
BUN SERPL-MCNC: 10 MG/DL (ref 7–30)
CALCIUM SERPL-MCNC: 9 MG/DL (ref 8.5–10.1)
CHLORIDE SERPL-SCNC: 106 MMOL/L (ref 94–109)
CO2 SERPL-SCNC: 30 MMOL/L (ref 20–32)
CREAT SERPL-MCNC: 0.53 MG/DL (ref 0.52–1.04)
DIFFERENTIAL METHOD BLD: NORMAL
EOSINOPHIL # BLD AUTO: 0.2 10E9/L (ref 0–0.7)
EOSINOPHIL NFR BLD AUTO: 2.5 %
ERYTHROCYTE [DISTWIDTH] IN BLOOD BY AUTOMATED COUNT: 13.1 % (ref 10–15)
GFR SERPL CREATININE-BSD FRML MDRD: NORMAL ML/MIN/1.7M2
GLUCOSE SERPL-MCNC: 83 MG/DL (ref 70–99)
HCT VFR BLD AUTO: 43.2 % (ref 35–47)
HGB BLD-MCNC: 14.1 G/DL (ref 11.7–15.7)
LIPASE SERPL-CCNC: 135 U/L (ref 73–393)
LYMPHOCYTES # BLD AUTO: 1.8 10E9/L (ref 0.8–5.3)
LYMPHOCYTES NFR BLD AUTO: 26.1 %
MCH RBC QN AUTO: 31.3 PG (ref 26.5–33)
MCHC RBC AUTO-ENTMCNC: 32.6 G/DL (ref 31.5–36.5)
MCV RBC AUTO: 96 FL (ref 78–100)
MONOCYTES # BLD AUTO: 0.4 10E9/L (ref 0–1.3)
MONOCYTES NFR BLD AUTO: 6.5 %
NEUTROPHILS # BLD AUTO: 4.3 10E9/L (ref 1.6–8.3)
NEUTROPHILS NFR BLD AUTO: 64 %
PLATELET # BLD AUTO: 189 10E9/L (ref 150–450)
POTASSIUM SERPL-SCNC: 5.1 MMOL/L (ref 3.4–5.3)
PROT SERPL-MCNC: 7.5 G/DL (ref 6.8–8.8)
RBC # BLD AUTO: 4.5 10E12/L (ref 3.8–5.2)
SODIUM SERPL-SCNC: 139 MMOL/L (ref 133–144)
WBC # BLD AUTO: 6.8 10E9/L (ref 4–11)

## 2017-08-14 PROCEDURE — 71020 XR CHEST 2 VW: CPT

## 2017-08-14 PROCEDURE — 85025 COMPLETE CBC W/AUTO DIFF WBC: CPT | Performed by: PHYSICIAN ASSISTANT

## 2017-08-14 PROCEDURE — 83690 ASSAY OF LIPASE: CPT | Performed by: PHYSICIAN ASSISTANT

## 2017-08-14 PROCEDURE — 80053 COMPREHEN METABOLIC PANEL: CPT | Performed by: PHYSICIAN ASSISTANT

## 2017-08-14 PROCEDURE — 82150 ASSAY OF AMYLASE: CPT | Performed by: PHYSICIAN ASSISTANT

## 2017-08-14 PROCEDURE — 36415 COLL VENOUS BLD VENIPUNCTURE: CPT | Performed by: PHYSICIAN ASSISTANT

## 2017-08-14 PROCEDURE — 99214 OFFICE O/P EST MOD 30 MIN: CPT | Performed by: PHYSICIAN ASSISTANT

## 2017-08-14 NOTE — PROGRESS NOTES
SUBJECTIVE:   Shirley Hendricks is a 56 year old female presenting with a chief complaint of having right lower side rib pain, tenderness, and costochondral tenderness .  Onset of symptoms was 2 month(s) ago.  Course of illness is same.    Severity mild to moderate  Current and Associated symptoms: right lower side rib pain, tenderness  Treatment measures tried include none.  Predisposing factors include none.    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 Simons dermatology     Embolism and thrombosis of unspecified artery (H) 8/00    Protein C,S, Leiden FV, Lupus Inhibitor Negative     Hyperlipidaemia      Hypertension      Myocardial infarction (H)      Osteoarthrosis, unspecified whether generalized or localized, unspecified site      PAD (peripheral artery disease) (H)      Peripheral vascular disease, unspecified (H) 12/00    s/p angioplasty with stent right femoral a.; Right iliac and femoral a. clot     Post-menopausal      Reflux esophagitis 2/04    EGD: esophagitis and medium HH     Uncomplicated asthma         Allergies   Allergen Reactions     Contrast Dye Anaphylaxis     RASH, FACIAL AND NECK SWELLING, SOB, WHEEZING     Pantoprazole      Protonix caused diffuse edema     Penicillins Itching         Social History   Substance Use Topics     Smoking status: Current Every Day Smoker     Packs/day: 0.25     Years: 40.00     Types: Cigarettes     Start date: 1960     Smokeless tobacco: Never Used      Comment: 1/2 PPD     Alcohol use 0.0 oz/week     0 Standard drinks or equivalent per week      Comment: ocasional       ROS:  CONSTITUTIONAL:NEGATIVE for fever, chills, change in weight  INTEGUMENTARY/SKIN: NEGATIVE for worrisome rashes, moles or lesions  ENT/MOUTH: NEGATIVE for ear, mouth and throat problems  RESP:NEGATIVE for significant cough or SOB  CV: NEGATIVE for chest pain, palpitations or peripheral edema  GI:  NEGATIVE for nausea, abdominal pain, heartburn, or change in bowel habits  MUSCULOSKELETAL: POSITIVE  for right side rib pain, tenderness, localized rib tenderness  NEURO: NEGATIVE for weakness, dizziness or paresthesias    OBJECTIVE  :/60  Pulse 57  Temp 97.9  F (36.6  C)  Wt 131 lb 3.2 oz (59.5 kg)  SpO2 98%  BMI 24.79 kg/m2  GENERAL APPEARANCE: healthy, alert and no distress  RESP: lungs clear to auscultation - no rales, rhonchi or wheezes  CV: regular rates and rhythm, normal S1 S2, no murmur noted  ABDOMEN:  soft, nontender, no HSM or masses and bowel sounds normal  Extremities: no peripheral edema or tenderness, peripheral pulses normal  MS: positive for right lower rib tenderness, localized pain  NEURO: Normal strength and tone, sensory exam grossly normal,  normal speech and mentation  SKIN: no suspicious lesions or rashes    Results for orders placed or performed in visit on 08/14/17   Comprehensive metabolic panel   Result Value Ref Range    Sodium 139 133 - 144 mmol/L    Potassium 5.1 3.4 - 5.3 mmol/L    Chloride 106 94 - 109 mmol/L    Carbon Dioxide 30 20 - 32 mmol/L    Anion Gap 3 3 - 14 mmol/L    Glucose 83 70 - 99 mg/dL    Urea Nitrogen 10 7 - 30 mg/dL    Creatinine 0.53 0.52 - 1.04 mg/dL    GFR Estimate >90  Non  GFR Calc   >60 mL/min/1.7m2    GFR Estimate If Black >90   GFR Calc   >60 mL/min/1.7m2    Calcium 9.0 8.5 - 10.1 mg/dL    Bilirubin Total 0.3 0.2 - 1.3 mg/dL    Albumin 3.8 3.4 - 5.0 g/dL    Protein Total 7.5 6.8 - 8.8 g/dL    Alkaline Phosphatase 66 40 - 150 U/L    ALT 37 0 - 50 U/L    AST 22 0 - 45 U/L   CBC with platelets differential   Result Value Ref Range    WBC 6.8 4.0 - 11.0 10e9/L    RBC Count 4.50 3.8 - 5.2 10e12/L    Hemoglobin 14.1 11.7 - 15.7 g/dL    Hematocrit 43.2 35.0 - 47.0 %    MCV 96 78 - 100 fl    MCH 31.3 26.5 - 33.0 pg    MCHC 32.6 31.5 - 36.5 g/dL    RDW 13.1 10.0 - 15.0 %    Platelet Count 189 150 - 450 10e9/L    Diff Method  Automated Method     % Neutrophils 64.0 %    % Lymphocytes 26.1 %    % Monocytes 6.5 %    % Eosinophils 2.5 %    % Basophils 0.9 %    Absolute Neutrophil 4.3 1.6 - 8.3 10e9/L    Absolute Lymphocytes 1.8 0.8 - 5.3 10e9/L    Absolute Monocytes 0.4 0.0 - 1.3 10e9/L    Absolute Eosinophils 0.2 0.0 - 0.7 10e9/L    Absolute Basophils 0.1 0.0 - 0.2 10e9/L   Amylase   Result Value Ref Range    Amylase 46 30 - 110 U/L       Chest xray Negative for acute findings, read by Corey RODNEY at time of visit.    ASSESSMENT/PLAN:      ICD-10-CM    1. Rib pain on right side R07.81 XR Chest 2 Views     Comprehensive metabolic panel     CBC with platelets differential   2. Right-sided abdominal pain of unknown cause R10.9 XR Chest 2 Views     Comprehensive metabolic panel     CBC with platelets differential     Lipase     Amylase       Ice compresses  Follow up as needed  See orders in Epic

## 2017-08-14 NOTE — MR AVS SNAPSHOT
After Visit Summary   8/14/2017    Shirley Hendricks    MRN: [id number]           Patient Information     Date Of Birth          1960        Visit Information        Provider Department      8/14/2017 9:35 AM Corey Walton PA-C [facility]        Today's Diagnoses     Rib pain on right side    -  1    Right-sided abdominal pain of unknown cause           Follow-ups after your visit        Your next 10 appointments already scheduled     Aug 17, 2017 11:00 AM CDT   MyChart Dermatology General with Jacoby Fischer MD   [facility] ([facility])    [address] [address]   [phone]            Feb 15, 2018  9:45 AM CST   US LOWER EXTREMITY ARTERIAL DUPLEX LEFT with SHVUS2   [facility] ([facility])    [address]  [facility] [address]   [phone]           Please bring a list of your medicines (including vitamins, minerals and over-the-counter drugs). Also, tell your doctor about any allergies you may have. Wear comfortable clothes and leave your valuables at home.  You do not need to do anything special to prepare for your exam.  Please call the Imaging Department at your exam site with any questions.            Feb 15, 2018 10:30 AM CST   US AORTA/IVC/ILIAC DUPLEX COMPLETE with SHVUS2   [facility] ([facility])    [address]  [facility] [address]   [phone]           Please bring a list of your medicines (including vitamins, minerals and over-the-counter drugs). Also, tell your doctor about any allergies you may have. Wear comfortable clothes and leave your valuables at home.  Adults: No eating or drinking for 8 hours before the exam. You may take medicine with a small sip of water.  Children: - Children 6+ years: No food or  drink for 6 hours before exam. - Children 1-5 years: No food or drink for 4 hours before exam. - Infants, breast-fed: may have breast milk up to 2 hours before exam. - Infants, formula: may have bottle until 4 hours before exam.  Please call the Imaging Department at your exam site with any questions.            Feb 15, 2018 11:30 AM CST   Return Visit with Chadwick Lozano MD   [facility] ([facility])    [address]  [facility] [address] [phone]              Future tests that were ordered for you today     Open Future Orders        Priority Expected Expires Ordered    US Aorta/IVC/Iliac Duplex Limited Routine 2/1/2018 8/14/2018 8/14/2017    US XAVIER Doppler with Exercise Routine 2/1/2018 8/14/2018 8/14/2017    US Lower Extremity Arterial Duplex Left Routine 2/1/2018 8/14/2018 8/14/2017            Who to contact     If you have questions or need follow up information about today's clinic visit or your schedule please contact [facility] directly at [phone].  Normal or non-critical lab and imaging results will be communicated to you by MyChart, letter or phone within 4 business days after the clinic has received the results. If you do not hear from us within 7 days, please contact the clinic through [employer] or phone. If you have a critical or abnormal lab result, we will notify you by phone as soon as possible.  Submit refill requests through [employer] or call your pharmacy and they will forward the refill request to us. Please allow 3 business days for your refill to be completed.          Additional Information About Your Visit        [employer] Information     [employer] gives you secure access to your electronic health record. If you see a primary care provider, you can also send messages to your care team and make appointments. If you have questions, please call your primary care clinic.  If  you do not have a primary care provider, please call [phone] and they will assist you.        Care EveryWhere ID     This is your Care EveryWhere ID. This could be used by other organizations to access your [place] medical records  [id number]        Your Vitals Were     Pulse Temperature Pulse Oximetry BMI (Body Mass Index)          57 97.9  F (36.6  C) 98% 24.79 kg/m2         Blood Pressure from Last 3 Encounters:   08/14/17 130/60   08/10/17 117/68   07/20/17 130/80    Weight from Last 3 Encounters:   08/14/17 131 lb 3.2 oz (59.5 kg)   07/20/17 130 lb 11.2 oz (59.3 kg)   03/28/17 131 lb 6.4 oz (59.6 kg)              We Performed the Following     Amylase     CBC with platelets differential     Comprehensive metabolic panel     Lipase        Primary Care Provider Office Phone # Fax #    Eun Narvaez [phone]       [address] [place] [address]        Equal Access to Services     JERONIMO CRUMP : Hadii aad ku hadasho Soomaali, waaxda luqadaha, qaybta kaalmada adeegyazoë, alesia dowd . So [facility] [phone].    ATENCIÓN: Si habla español, tiene a mahmood disposición servicios gratuitos de asistencia lingüística. Llame al [phone].    We comply with applicable federal civil rights laws and [place] laws. We do not discriminate on the basis of race, color, national origin, age, disability sex, sexual orientation or gender identity.            Thank you!     Thank you for choosing [facility]  for your care. Our goal is always to provide you with excellent care. Hearing back from our patients is one way we can continue to improve our services. Please take a few minutes to complete the written survey that you may receive in the mail after your visit with us. Thank you!             Your Updated Medication List - Protect others around you: Learn how to safely use, store and throw away your medicines at www.disposemymeds.org.           This list is accurate as of: 8/14/17 11:36 AM.  Always use your most recent med list.                   Brand Name Dispense Instructions for use Diagnosis    AIRBORNE Tbef     30 tablet    Take 1 tablet by mouth every morning INDICATION: VITAMIN C SUPPLEMENT    Scurvy, Low iron       albuterol 108 (90 BASE) MCG/ACT Inhaler    PROAIR HFA/PROVENTIL HFA/VENTOLIN HFA    1 Inhaler    Inhale 2 puffs into the lungs every 6 hours as needed for shortness of breath / dyspnea or wheezing    Chronic obstructive pulmonary disease, unspecified COPD type (H)       ASPIRIN EC PO      Take 81 mg by mouth daily        atorvastatin 80 MG tablet    LIPITOR    90 tablet    TAKE ONE TABLET (80MG) BY MOUTH EVERY EVENING INDICATION:TO LOWER CHOLESTEROL AND TO HELP REDUCE RISK OF REOCURRENCE OF HEAR ATTACK STROKE,A    PAD (peripheral artery disease) (H), Hyperlipidemia LDL goal <70       B COMPLEX 50 Tabs     100 tablet    Take 1 tablet by mouth every other day INDICATION: VITAMIN SUPPLEMENT    Neuropathy (H)       CALCIUM 600-D 600-400 MG-UNIT Tabs   Generic drug:  Calcium Carbonate-Vitamin D3     100 tablet    TAKE 1 TABLET BY MOUTH 2 TIMES DAILY FOR BONE HEALTH AND FOR VITAMIN D DEFICIENCY (LOW VITAMIN D)    Vitamin D deficiency       cholecalciferol 2000 UNITS Caps     100 capsule    Take 2 capsules by mouth daily FOR VITAMIN D DEFICIENCY (LOW VITAMIN D)    Vitamin D deficiency       clopidogrel 75 MG tablet    PLAVIX    90 tablet    TAKE ONE TABLET BY MOUTH ONE TIME DAILY    Peripheral vascular disease, unspecified (H), History of TIA (transient ischemic attack) and stroke       cyabnocobalamin 2500 MCG sublingual tablet    VITAMIN B-12    100 tablet    Take 2,500 mcg by mouth daily INDICATION: FOR VITAMIN B12 SUPPLEMENTATION - TO IMPROVE MEMORY, BALANCE, SLEEP AND MOOD, DIRECTIONS: PLEASE PLACE UNDER THE TONGUE TO DISSOLVE    Neuropathy (H)       denosumab 60 MG/ML Soln injection    PROLIA    1 Syringe    Inject 1 mL (60 mg)  Subcutaneous every 6 months INDICATION: TO TREAT OSTEOPOROSIS    Osteoporosis       ezetimibe 10 MG tablet    ZETIA    90 tablet    TAKE 1 TABLET (10 MG) BY MOUTH DAILY INDICATION: TO LOWER CHOLESTEROL    PAD (peripheral artery disease) (H), Coronary artery disease involving native coronary artery of native heart without angina pectoris       fluticasone-vilanterol 200-25 MCG/INH oral inhaler    BREO ELLIPTA    1 Inhaler    Inhale 1 puff into the lungs daily INDICATION: COPD    Chronic obstructive pulmonary disease, unspecified COPD type (H), Tobacco use disorder       ISOSORBIDE DINITRATE PO      Take 30 mg by mouth every morning Verified with Cub and patient med bottle as 30 mg Dinitrate daily.        lisinopril 40 MG tablet    PRINIVIL/ZESTRIL    90 tablet    Take 1 tablet (40 mg) by mouth daily INDICATION:TO LOWER BLOOD PRESSURE AND TO PRESERVE KIDNEY FUNCTION    PAD (peripheral artery disease) (H)       loratadine 10 MG tablet    CLARITIN    90 tablet    Take 1 tablet (10 mg) by mouth daily INDICATION: TO CONTROL ALLERGY SYMPTOMS    Chronic allergic rhinitis due to animal hair and dander       metoprolol 25 MG 24 hr tablet    TOPROL-XL    90 tablet    Take 0.5 tablets (12.5 mg) by mouth 2 times daily INDICATION:TO LOWER BLOOD PRESSURE AND TO HELP PREVENT HEART DISEASE    Essential hypertension, benign       nitroGLYcerin 0.4 MG sublingual tablet    NITROSTAT    15 tablet    Place 1 tablet (0.4 mg) under the tongue See Admin Instructions for chest pain    Personal history of MI (myocardial infarction)       OMEGA-3 FISH OIL PO      Take 2 capsules by mouth 2 times daily        omeprazole 20 MG CR capsule    priLOSEC    90 capsule    TAKE 1 CAPSULE (20 MG) BY MOUTH DAILY INDICATION: TO CONTROL REFLUX SYMPTOMS    Reflux esophagitis       order for DME     1 Device    Equipment being ordered: BP MACHINE WITH PULSE MONITOR    HTN (hypertension)       traMADol 50 MG tablet    ULTRAM    120 tablet    Take 1 tablet (50  mg) by mouth every 6 hours as needed for moderate pain    Primary osteoarthritis involving multiple joints       TYLENOL PO      Take 500-1,000 mg by mouth every 6 hours as needed for mild pain or fever

## 2017-08-14 NOTE — TELEPHONE ENCOUNTER
Patient called and informed her insurance will not cover vitamins you will have to pay. ANA MARÍA Quinones

## 2017-08-14 NOTE — TELEPHONE ENCOUNTER
Inform patient that all her results and xrays are normal.  I would advise ice and tylenol for the pain.  Its recommended that she have a follow up with PCP for recheck on this   Since its been going on for so long.  Theres no other tests in the urgent care I can order for this pain.    Thank you  RASHAUN Momin PA-C

## 2017-08-14 NOTE — NURSING NOTE
"Chief Complaint   Patient presents with     Urgent Care     Pt reports right sided ches pain with a lump that come out and she has to put back per report. Pt also states that she vomits every other day and sometimes twice a day. Pt has had an increase in gastric reflux and reports tasting charcoal in her mouth.Pt states that her sxs have gotten progressively worse X several months.       Initial /60  Pulse 57  Temp 97.9  F (36.6  C)  Wt 131 lb 3.2 oz (59.5 kg)  SpO2 98%  BMI 24.79 kg/m2 Estimated body mass index is 24.79 kg/(m^2) as calculated from the following:    Height as of 3/28/17: 5' 1\" (1.549 m).    Weight as of this encounter: 131 lb 3.2 oz (59.5 kg).  Medication Reconciliation: complete    "

## 2017-08-16 NOTE — PROGRESS NOTES
Dear Shirley,      Your lung CT scan was normal. Annual follow up is recommended.    Regards,  Dr. Clayton Narvaez  [facility]

## 2017-08-26 ENCOUNTER — MYC MEDICAL ADVICE (OUTPATIENT)
Dept: INTERNAL MEDICINE | Facility: CLINIC | Age: 57
End: 2017-08-26

## 2017-08-26 DIAGNOSIS — R10.11 RUQ ABDOMINAL PAIN: Primary | ICD-10-CM

## 2017-08-28 NOTE — TELEPHONE ENCOUNTER
"Writer called to follow up on MyChart message. Patient stated that this is a recurrent issue that was addressed with provider before. Patient stated that bulge and pain only shows up when she bends over to her right side towards her leg and feels like a \"little babies head size bump\". Patient states it takes away her breath for a little. If pain lasts she takes Tylenol. Patient denies chest pain.    PCP please note [employer] message below and advise.     "

## 2017-08-28 NOTE — TELEPHONE ENCOUNTER
Please help her set up RU US asap and a follow up visit with me. Please respond to let me know you got this message, thanks.

## 2017-08-29 NOTE — TELEPHONE ENCOUNTER
I did not get this message in time  But it looks like the pt is scheduled for her U/S here at [facility] 9/1  I called and scheduled pt's f/u with MD on 9/7 @ 130p

## 2017-09-01 ENCOUNTER — RADIANT APPOINTMENT (OUTPATIENT)
Dept: ULTRASOUND IMAGING | Facility: CLINIC | Age: 57
End: 2017-09-01
Attending: INTERNAL MEDICINE
Payer: COMMERCIAL

## 2017-09-01 DIAGNOSIS — R10.11 RUQ ABDOMINAL PAIN: ICD-10-CM

## 2017-09-01 PROCEDURE — 76705 ECHO EXAM OF ABDOMEN: CPT

## 2017-09-07 ENCOUNTER — TELEPHONE (OUTPATIENT)
Dept: INTERNAL MEDICINE | Facility: CLINIC | Age: 57
End: 2017-09-07

## 2017-09-07 ENCOUNTER — OFFICE VISIT (OUTPATIENT)
Dept: INTERNAL MEDICINE | Facility: CLINIC | Age: 57
End: 2017-09-07
Payer: COMMERCIAL

## 2017-09-07 VITALS
DIASTOLIC BLOOD PRESSURE: 62 MMHG | SYSTOLIC BLOOD PRESSURE: 128 MMHG | BODY MASS INDEX: 24.87 KG/M2 | HEIGHT: 61 IN | WEIGHT: 131.7 LBS | HEART RATE: 80 BPM | TEMPERATURE: 98.1 F | OXYGEN SATURATION: 99 %

## 2017-09-07 DIAGNOSIS — E61.1 LOW IRON: ICD-10-CM

## 2017-09-07 DIAGNOSIS — M81.6 LOCALIZED OSTEOPOROSIS WITHOUT CURRENT PATHOLOGICAL FRACTURE: Primary | ICD-10-CM

## 2017-09-07 DIAGNOSIS — F43.9 SITUATIONAL STRESS: ICD-10-CM

## 2017-09-07 DIAGNOSIS — E54 SCURVY: ICD-10-CM

## 2017-09-07 DIAGNOSIS — Z23 NEED FOR PROPHYLACTIC VACCINATION AND INOCULATION AGAINST INFLUENZA: ICD-10-CM

## 2017-09-07 DIAGNOSIS — I25.10 CORONARY ARTERY DISEASE INVOLVING NATIVE CORONARY ARTERY OF NATIVE HEART WITHOUT ANGINA PECTORIS: ICD-10-CM

## 2017-09-07 DIAGNOSIS — F17.200 TOBACCO USE DISORDER: ICD-10-CM

## 2017-09-07 DIAGNOSIS — M15.0 PRIMARY OSTEOARTHRITIS INVOLVING MULTIPLE JOINTS: ICD-10-CM

## 2017-09-07 DIAGNOSIS — G89.4 CHRONIC PAIN SYNDROME: ICD-10-CM

## 2017-09-07 DIAGNOSIS — I10 ESSENTIAL HYPERTENSION, BENIGN: ICD-10-CM

## 2017-09-07 DIAGNOSIS — J44.9 CHRONIC OBSTRUCTIVE PULMONARY DISEASE, UNSPECIFIED COPD TYPE (H): ICD-10-CM

## 2017-09-07 DIAGNOSIS — K80.12 CALCULUS OF GALLBLADDER WITH ACUTE ON CHRONIC CHOLECYSTITIS WITHOUT OBSTRUCTION: Primary | ICD-10-CM

## 2017-09-07 DIAGNOSIS — Z01.818 PREOP GENERAL PHYSICAL EXAM: ICD-10-CM

## 2017-09-07 DIAGNOSIS — Z12.11 SPECIAL SCREENING FOR MALIGNANT NEOPLASMS, COLON: ICD-10-CM

## 2017-09-07 DIAGNOSIS — E55.9 VITAMIN D DEFICIENCY: ICD-10-CM

## 2017-09-07 DIAGNOSIS — I73.9 PAD (PERIPHERAL ARTERY DISEASE) (H): ICD-10-CM

## 2017-09-07 DIAGNOSIS — D13.5 ADENOMYOMATOSIS OF GALLBLADDER: ICD-10-CM

## 2017-09-07 DIAGNOSIS — E78.5 HYPERLIPIDEMIA LDL GOAL <70: ICD-10-CM

## 2017-09-07 DIAGNOSIS — K80.20 SYMPTOMATIC CHOLELITHIASIS: ICD-10-CM

## 2017-09-07 DIAGNOSIS — G62.9 NEUROPATHY: ICD-10-CM

## 2017-09-07 PROCEDURE — 99215 OFFICE O/P EST HI 40 MIN: CPT | Mod: 25 | Performed by: INTERNAL MEDICINE

## 2017-09-07 PROCEDURE — 90471 IMMUNIZATION ADMIN: CPT | Performed by: INTERNAL MEDICINE

## 2017-09-07 PROCEDURE — 90686 IIV4 VACC NO PRSV 0.5 ML IM: CPT | Performed by: INTERNAL MEDICINE

## 2017-09-07 RX ORDER — ATORVASTATIN CALCIUM 80 MG/1
TABLET, FILM COATED ORAL
Qty: 90 TABLET | Refills: 2 | Status: SHIPPED | OUTPATIENT
Start: 2017-09-07 | End: 2018-07-27

## 2017-09-07 RX ORDER — MV-MIN/VIT C/GLUT/LYSINE/HB124 1000-50 MG
1 TABLET, EFFERVESCENT ORAL EVERY MORNING
Qty: 30 TABLET | Refills: 11 | Status: SHIPPED | OUTPATIENT
Start: 2017-09-07 | End: 2017-11-07

## 2017-09-07 RX ORDER — ALBUTEROL SULFATE 90 UG/1
2 AEROSOL, METERED RESPIRATORY (INHALATION) EVERY 6 HOURS PRN
Qty: 1 INHALER | Status: SHIPPED | OUTPATIENT
Start: 2017-09-07 | End: 2018-08-07

## 2017-09-07 RX ORDER — BUPROPION HYDROCHLORIDE 300 MG/1
300 TABLET ORAL EVERY MORNING
Qty: 90 TABLET | Refills: 3 | Status: SHIPPED | OUTPATIENT
Start: 2017-09-07 | End: 2017-09-07

## 2017-09-07 RX ORDER — TRAMADOL HYDROCHLORIDE 50 MG/1
50 TABLET ORAL EVERY 6 HOURS PRN
Qty: 120 TABLET | Refills: 3 | Status: ON HOLD | OUTPATIENT
Start: 2017-09-07 | End: 2017-09-27

## 2017-09-07 RX ORDER — METOPROLOL SUCCINATE 25 MG/1
12.5 TABLET, EXTENDED RELEASE ORAL 2 TIMES DAILY
Qty: 90 TABLET | Refills: 3 | Status: ON HOLD | OUTPATIENT
Start: 2017-09-07 | End: 2017-09-27

## 2017-09-07 RX ORDER — EZETIMIBE 10 MG/1
TABLET ORAL
Qty: 90 TABLET | Status: SHIPPED | OUTPATIENT
Start: 2017-09-07 | End: 2018-08-07

## 2017-09-07 RX ORDER — CYANOCOBALAMIN (VITAMIN B-12) 2500 MCG
2500 TABLET, SUBLINGUAL SUBLINGUAL DAILY
Qty: 100 TABLET | Refills: 3 | Status: SHIPPED | OUTPATIENT
Start: 2017-09-07 | End: 2017-11-07

## 2017-09-07 RX ORDER — BUPROPION HYDROCHLORIDE 300 MG/1
300 TABLET ORAL EVERY MORNING
Qty: 90 TABLET | Refills: 3 | Status: SHIPPED | OUTPATIENT
Start: 2017-09-07 | End: 2017-11-07

## 2017-09-07 RX ORDER — LISINOPRIL 40 MG/1
40 TABLET ORAL DAILY
Qty: 90 TABLET | Refills: 2 | Status: SHIPPED | OUTPATIENT
Start: 2017-09-07 | End: 2018-08-07

## 2017-09-07 NOTE — TELEPHONE ENCOUNTER
PRIOR AUTHORIZATION REQUIRED      Prior Authorization Required on: [employer]  [facility]  Patient ID: [id number]  Insurance Phone Number: [phone]    Please let us know when the PA has been approved or denied.    Thank you!    Thank you!    Eleni Peter [facility]  [email or web address]  [phone]

## 2017-09-07 NOTE — MR AVS SNAPSHOT
After Visit Summary   9/7/2017    Shirley Hendricks    MRN: [id number]           Patient Information     Date Of Birth          1960        Visit Information        Provider Department      9/7/2017 1:30 PM Eun Narvaez MD [facility]        Today's Diagnoses     Calculus of gallbladder with acute on chronic cholecystitis without obstruction    -  1    Adenomyomatosis of gallbladder        Primary osteoarthritis involving multiple joints        Chronic pain syndrome        Tobacco use disorder        Chronic obstructive pulmonary disease, unspecified COPD type (H)        PAD (peripheral artery disease) (H)        Hyperlipidemia LDL goal <70        Vitamin D deficiency        Neuropathy (H)        Scurvy        Low iron        Essential hypertension, benign        Coronary artery disease involving native coronary artery of native heart without angina pectoris        Situational stress        Need for prophylactic vaccination and inoculation against influenza        Special screening for malignant neoplasms, colon          Care Instructions    ** FOLLOW UP PLAN**:    PLEASE SCHEDULE OFFICE VISIT WITH DR. ISIS PANDA 2 MONTHS FROM TODAY TO FOLLOW UP ON  SITUATIONAL ANXIETY, SMOKING CESSATION AND ALSO TO ESTABLISH CARE      YOU HAVE BEEN REFERRED FOR SURGICAL CONSULTATION TO ADDRESS ISSUES RAISED TODAY REGARDING GALLBLADDER DISEASE    PLEASE  MAKE SURE TO SCHEDULE YOUR APPOINTMENT(S) BY TELEPHONE      YOU MAY CONTACT THE CLINIC IF ANY QUESTIONS OR CONCERNS -[phone] OR [facility]                   Follow-ups after your visit        Additional Services     GENERAL SURG ADULT REFERRAL       Your provider has referred you to: LM: [facility] Tabitha Chen [phone]   [email or web address]    Please be aware that coverage of these services is subject to the terms and limitations of your health insurance plan.  Call  member services at your health plan with any benefit or coverage questions.      Please bring the following with you to your appointment:    (1) Any X-Rays, CTs or MRIs which have been performed.  Contact the facility where they were done to arrange for  prior to your scheduled appointment.   (2) List of current medications   (3) This referral request   (4) Any documents/labs given to you for this referral                  Your next 10 appointments already scheduled     Feb 15, 2018  9:45 AM CST   US LOWER EXTREMITY ARTERIAL DUPLEX LEFT with SHVUS2   [facility] ([facility])    [address]  [facility] [address]   [phone]           Please bring a list of your medicines (including vitamins, minerals and over-the-counter drugs). Also, tell your doctor about any allergies you may have. Wear comfortable clothes and leave your valuables at home.  You do not need to do anything special to prepare for your exam.  Please call the Imaging Department at your exam site with any questions.            Feb 15, 2018 10:30 AM CST   US AORTA/IVC/ILIAC DUPLEX COMPLETE with SHVUS2   [facility] ([facility])    [address]  [facility] [address]   [phone]           Please bring a list of your medicines (including vitamins, minerals and over-the-counter drugs). Also, tell your doctor about any allergies you may have. Wear comfortable clothes and leave your valuables at home.  Adults: No eating or drinking for 8 hours before the exam. You may take medicine with a small sip of water.  Children: - Children 6+ years: No food or drink for 6 hours before exam. - Children 1-5 years: No food or drink for 4 hours before exam. - Infants, breast-fed: may have breast milk up to 2 hours before exam. - Infants, formula: may have bottle until 4 hours before exam.  Please call the Imaging  Department at your exam site with any questions.            Feb 15, 2018 11:15 AM CST   US XAVIER DOPPLER WITH EXERCISE with SHVUS2   [facility] ([facility])    [address]  [place] [address]   [phone]           Please bring a list of your medicines (including vitamins, minerals and over-the-counter drugs). Also, tell your doctor about any allergies you may have. Wear comfortable clothes and leave your valuables at home.  No caffeine or tobacco for 1 hour prior to exam.  Please call the Imaging Department at your exam site with any questions.            Feb 15, 2018 11:45 AM CST   Return Visit with Chadwick Lozano MD   [facility] ([facility])    [address]  [place] [address] [phone]              Future tests that were ordered for you today     Open Future Orders        Priority Expected Expires Ordered    Fecal colorectal cancer screen FIT Routine 9/28/2017 11/30/2017 9/7/2017    Calcium Routine 9/21/2017 3/6/2018 9/7/2017    Magnesium Routine 9/21/2017 3/6/2018 9/7/2017    Phosphorus Routine 9/21/2017 3/6/2018 9/7/2017            Who to contact     If you have questions or need follow up information about today's clinic visit or your schedule please contact [facility] directly at [phone].  Normal or non-critical lab and imaging results will be communicated to you by MyChart, letter or phone within 4 business days after the clinic has received the results. If you do not hear from us within 7 days, please contact the clinic through [employer] or phone. If you have a critical or abnormal lab result, we will notify you by phone as soon as possible.  Submit refill requests through [employer] or call your pharmacy and they will forward the refill request to us. Please allow 3 business days for your refill to be  "completed.          Additional Information About Your Visit        [employer] Information     [employer] gives you secure access to your electronic health record. If you see a primary care provider, you can also send messages to your care team and make appointments. If you have questions, please call your primary care clinic.  If you do not have a primary care provider, please call [phone] and they will assist you.        Care EveryWhere ID     This is your Care EveryWhere ID. This could be used by other organizations to access your [facility] medical records  [id number]        Your Vitals Were     Pulse Temperature Height Pulse Oximetry BMI (Body Mass Index)       80 98.1  F (36.7  C) (Oral) 5' 1\" (1.549 m) 99% 24.88 kg/m2        Blood Pressure from Last 3 Encounters:   09/07/17 128/62   08/14/17 130/60   08/10/17 117/68    Weight from Last 3 Encounters:   09/07/17 131 lb 11.2 oz (59.7 kg)   08/14/17 131 lb 3.2 oz (59.5 kg)   07/20/17 130 lb 11.2 oz (59.3 kg)              We Performed the Following     FLU VAC, SPLIT VIRUS IM > 3 YO (QUADRIVALENT) [99508]     GENERAL SURG ADULT REFERRAL     Vaccine Administration, Initial [52593]          Today's Medication Changes          These changes are accurate as of: 9/7/17  2:40 PM.  If you have any questions, ask your nurse or doctor.               Start taking these medicines.        Dose/Directions    buPROPion 300 MG 24 hr tablet   Commonly known as:  WELLBUTRIN XL   Used for:  Tobacco use disorder, Situational stress   Started by:  Eun Narvaez MD        Dose:  300 mg   Take 1 tablet (300 mg) by mouth every morning INDICATION: SMOKING CESSATION AND SITUATIONAL STRESS   Quantity:  90 tablet   Refills:  3         These medicines have changed or have updated prescriptions.        Dose/Directions    Calcium Carbonate-Vitamin D3 600-400 MG-UNIT Tabs   Commonly known as:  CALCIUM 600-D   This may have changed:  See the new instructions.   Used for:  Vitamin D " deficiency   Changed by:  Eun Narvaez MD        Dose:  1 tablet   Take 1 tablet by mouth 2 times daily (with meals)   Quantity:  100 tablet   Refills:  PRN       * denosumab 60 MG/ML Soln injection   Commonly known as:  PROLIA   This may have changed:  Another medication with the same name was added. Make sure you understand how and when to take each.   Used for:  Osteoporosis   Changed by:  Eun Narvaez MD        Dose:  60 mg   Inject 1 mL (60 mg) Subcutaneous every 6 months INDICATION: TO TREAT OSTEOPOROSIS   Quantity:  1 Syringe   Refills:  PRN       * denosumab 60 MG/ML Soln injection   Commonly known as:  PROLIA   This may have changed:  You were already taking a medication with the same name, and this prescription was added. Make sure you understand how and when to take each.   Used for:  Localized osteoporosis without current pathological fracture   Changed by:  Eun Narvaez MD        Dose:  60 mg   Inject 1 mL (60 mg) Subcutaneous once for 1 dose   Quantity:  1 mL   Refills:  0       ezetimibe 10 MG tablet   Commonly known as:  ZETIA   This may have changed:  See the new instructions.   Used for:  PAD (peripheral artery disease) (H), Coronary artery disease involving native coronary artery of native heart without angina pectoris   Changed by:  Eun Narvaez MD        TAKE 1 TABLET (10 MG) BY MOUTH DAILY INDICATION: TO LOWER CHOLESTEROL   Quantity:  90 tablet   Refills:  PRN       fluticasone-vilanterol 200-25 MCG/INH oral inhaler   Commonly known as:  BREO ELLIPTA   This may have changed:  additional instructions   Used for:  Chronic obstructive pulmonary disease, unspecified COPD type (H), Tobacco use disorder   Changed by:  Eun Narvaez MD        Dose:  1 puff   Inhale 1 puff into the lungs daily INDICATION: COPD CONTROLLER   Quantity:  1 Inhaler   Refills:  11       * Notice:  This list has 2 medication(s) that are the same as other medications prescribed for you. Read  the directions carefully, and ask your doctor or other care provider to review them with you.         Where to get your medicines      These medications were sent to [facility] - [place], [place] - [address] [facility]  [address] [facility] [address]     Phone:  [phone]     AIRBORNE Tbef    albuterol 108 (90 BASE) MCG/ACT Inhaler    atorvastatin 80 MG tablet    B COMPLEX 50 Tabs    buPROPion 300 MG 24 hr tablet    Calcium Carbonate-Vitamin D3 600-400 MG-UNIT Tabs    cholecalciferol 2000 UNITS Caps    ezetimibe 10 MG tablet    fluticasone-vilanterol 200-25 MCG/INH oral inhaler    lisinopril 40 MG tablet    metoprolol 25 MG 24 hr tablet         These medications were sent to [facility] [place], [place] - [address]  [address] [address]     Phone:  [phone]     denosumab 60 MG/ML Soln injection         Some of these will need a paper prescription and others can be bought over the counter.  Ask your nurse if you have questions.     Bring a paper prescription for each of these medications     cyabnocobalamin 2500 MCG sublingual tablet    traMADol 50 MG tablet                Primary Care Provider Office Phone # Fax #    Eun Narvaez [phone]       [address] [place], [place] [address] [facility] [address]        Equal Access to Services     JAEL CRUMP : Hadii adela ku hadasho Soomaali, waaxda luqadaha, qaybta kaalmada adeegyada, alesia cerda. So [facility] [phone].    ATENCIÓN: Si habla español, tiene a mahmood disposición servicios gratuitos de asistencia lingüística. Pauly al [phone].    We comply with applicable federal civil rights laws and [place] laws. We do not discriminate on the basis of race, color, national origin, age, disability sex, sexual orientation or gender identity.            Thank you!     Thank you for choosing [facility]  for your care. Our goal is always to provide  you with excellent care. Hearing back from our patients is one way we can continue to improve our services. Please take a few minutes to complete the written survey that you may receive in the mail after your visit with us. Thank you!             Your Updated Medication List - Protect others around you: Learn how to safely use, store and throw away your medicines at www.disposemymeds.org.          This list is accurate as of: 9/7/17  2:40 PM.  Always use your most recent med list.                   Brand Name Dispense Instructions for use Diagnosis    AIRBORNE Tbef     30 tablet    Take 1 tablet by mouth every morning INDICATION: VITAMIN C SUPPLEMENT    Scurvy, Low iron       albuterol 108 (90 BASE) MCG/ACT Inhaler    PROAIR HFA/PROVENTIL HFA/VENTOLIN HFA    1 Inhaler    Inhale 2 puffs into the lungs every 6 hours as needed for shortness of breath / dyspnea or wheezing    Chronic obstructive pulmonary disease, unspecified COPD type (H)       ASPIRIN EC PO      Take 81 mg by mouth daily        atorvastatin 80 MG tablet    LIPITOR    90 tablet    TAKE ONE TABLET (80MG) BY MOUTH EVERY EVENING INDICATION:TO LOWER CHOLESTEROL AND TO HELP REDUCE RISK OF REOCURRENCE OF HEAR ATTACK STROKE,A    PAD (peripheral artery disease) (H), Hyperlipidemia LDL goal <70       B COMPLEX 50 Tabs     100 tablet    Take 1 tablet by mouth every other day INDICATION: VITAMIN SUPPLEMENT    Neuropathy (H)       buPROPion 300 MG 24 hr tablet    WELLBUTRIN XL    90 tablet    Take 1 tablet (300 mg) by mouth every morning INDICATION: SMOKING CESSATION AND SITUATIONAL STRESS    Tobacco use disorder, Situational stress       Calcium Carbonate-Vitamin D3 600-400 MG-UNIT Tabs    CALCIUM 600-D    100 tablet    Take 1 tablet by mouth 2 times daily (with meals)    Vitamin D deficiency       cholecalciferol 2000 UNITS Caps     100 capsule    Take 2 capsules by mouth daily FOR VITAMIN D DEFICIENCY (LOW VITAMIN D)    Vitamin D deficiency       clopidogrel 75  MG tablet    PLAVIX    90 tablet    TAKE ONE TABLET BY MOUTH ONE TIME DAILY    Peripheral vascular disease, unspecified (H), History of TIA (transient ischemic attack) and stroke       cyabnocobalamin 2500 MCG sublingual tablet    VITAMIN B-12    100 tablet    Take 2,500 mcg by mouth daily INDICATION: FOR VITAMIN B12 SUPPLEMENTATION - TO IMPROVE MEMORY, BALANCE, SLEEP AND MOOD, DIRECTIONS: PLEASE PLACE UNDER THE TONGUE TO DISSOLVE    Neuropathy (H)       * denosumab 60 MG/ML Soln injection    PROLIA    1 Syringe    Inject 1 mL (60 mg) Subcutaneous every 6 months INDICATION: TO TREAT OSTEOPOROSIS    Osteoporosis       * denosumab 60 MG/ML Soln injection    PROLIA    1 mL    Inject 1 mL (60 mg) Subcutaneous once for 1 dose    Localized osteoporosis without current pathological fracture       ezetimibe 10 MG tablet    ZETIA    90 tablet    TAKE 1 TABLET (10 MG) BY MOUTH DAILY INDICATION: TO LOWER CHOLESTEROL    PAD (peripheral artery disease) (H), Coronary artery disease involving native coronary artery of native heart without angina pectoris       fluticasone-vilanterol 200-25 MCG/INH oral inhaler    BREO ELLIPTA    1 Inhaler    Inhale 1 puff into the lungs daily INDICATION: COPD CONTROLLER    Chronic obstructive pulmonary disease, unspecified COPD type (H), Tobacco use disorder       ISOSORBIDE DINITRATE PO      Take 30 mg by mouth every morning Verified with Cub and patient med bottle as 30 mg Dinitrate daily.        lisinopril 40 MG tablet    PRINIVIL/ZESTRIL    90 tablet    Take 1 tablet (40 mg) by mouth daily INDICATION:TO LOWER BLOOD PRESSURE AND TO PRESERVE KIDNEY FUNCTION    PAD (peripheral artery disease) (H)       loratadine 10 MG tablet    CLARITIN    90 tablet    Take 1 tablet (10 mg) by mouth daily INDICATION: TO CONTROL ALLERGY SYMPTOMS    Chronic allergic rhinitis due to animal hair and dander       metoprolol 25 MG 24 hr tablet    TOPROL-XL    90 tablet    Take 0.5 tablets (12.5 mg) by mouth 2 times  daily INDICATION:TO LOWER BLOOD PRESSURE AND TO HELP PREVENT HEART DISEASE    Essential hypertension, benign       nitroGLYcerin 0.4 MG sublingual tablet    NITROSTAT    15 tablet    Place 1 tablet (0.4 mg) under the tongue See Admin Instructions for chest pain    Personal history of MI (myocardial infarction)       OMEGA-3 FISH OIL PO      Take 2 capsules by mouth 2 times daily        omeprazole 20 MG CR capsule    priLOSEC    90 capsule    TAKE 1 CAPSULE (20 MG) BY MOUTH DAILY INDICATION: TO CONTROL REFLUX SYMPTOMS    Reflux esophagitis       order for DME     1 Device    Equipment being ordered: BP MACHINE WITH PULSE MONITOR    HTN (hypertension)       traMADol 50 MG tablet    ULTRAM    120 tablet    Take 1 tablet (50 mg) by mouth every 6 hours as needed for moderate pain    Primary osteoarthritis involving multiple joints       TYLENOL PO      Take 500-1,000 mg by mouth every 6 hours as needed for mild pain or fever        * Notice:  This list has 2 medication(s) that are the same as other medications prescribed for you. Read the directions carefully, and ask your doctor or other care provider to review them with you.

## 2017-09-07 NOTE — NURSING NOTE
"Chief Complaint   Patient presents with     Results       Initial /62  Pulse 80  Temp 98.1  F (36.7  C) (Oral)  Ht 5' 1\" (1.549 m)  Wt 131 lb 11.2 oz (59.7 kg)  SpO2 99%  BMI 24.88 kg/m2 Estimated body mass index is 24.88 kg/(m^2) as calculated from the following:    Height as of this encounter: 5' 1\" (1.549 m).    Weight as of this encounter: 131 lb 11.2 oz (59.7 kg).  Medication Reconciliation: complete   Valencia Neumann MA   "

## 2017-09-07 NOTE — PATIENT INSTRUCTIONS
** FOLLOW UP PLAN**:    PLEASE SCHEDULE OFFICE VISIT WITH DR. ISIS PANDA 2 MONTHS FROM TODAY TO FOLLOW UP ON  SITUATIONAL ANXIETY, SMOKING CESSATION AND ALSO TO ESTABLISH CARE      YOU HAVE BEEN REFERRED FOR SURGICAL CONSULTATION TO ADDRESS ISSUES RAISED TODAY REGARDING GALLBLADDER DISEASE    PLEASE  MAKE SURE TO SCHEDULE YOUR APPOINTMENT(S) BY TELEPHONE      YOU MAY CONTACT THE CLINIC IF ANY QUESTIONS OR CONCERNS [phone] OR VIA [employer]

## 2017-09-07 NOTE — PROGRESS NOTES
SUBJECTIVE:   Shirley Hendircks is a 56 year old female who presents to clinic today for the following health issues:      Date of Exam: 9/7/2017    Date of Surgery: 9/27/17  [facility]  [facility]: [facility]   Primary Physician: Solange  Type of Anesthesia Anticipated:General    Shirley Hendricks is a 56 year old year old female (1960) who presents for pre-op evaluation undergoing lap cholecystectomy surgery for treatment of  cholecystitis.    Recent infectious illnesses?  NO  Complications with previous surgeries?  NO  Adverse reactions to anesthesia?  NO  Any known family history of anesthesia complications?  NO  Aspirin or NSAID use within the past 5 days:  NO      Concern - Patient here to discuss recent lab results and smoking cessation options       Abdominal Pain and swelling right upper abdomen      Duration: ongoing for over a year and worse over the last few months. Worse with meals, and also with bending over.  Recent CT scan of the abdomen revealed presence of stones within an enlarged gallbladder with suspicion of associated cholecystitis and gallbladder wall appearance  Consistent with gallbladder adenomyomatosis  .  Hypertension Follow-up      Outpatient blood pressures are not being checked.    Low Salt Diet: no added salt    COPD Follow-Up    Symptoms are currently: stable    Current fatigue or dyspnea with ambulation: none    Shortness of breath: stable    Increased or change in Cough/Sputum: No    Fever(s): No    Baseline ambulation without stopping to rest: 2 flights assessment. Able to walk up 1 flight of stairs without stopping to rest.    Any ER/UC or hospital admissions since your last visit? No     History   Smoking Status     Current Every Day Smoker     Packs/day: 0.25     Years: 40.00     Types: Cigarettes     Start date: 1960   Smokeless Tobacco     Never Used     Comment: 1/2 PPD     No results found for: FEV1, GUT6YUC  Of note  "she has only been using her rescue inhaler. She states that she uses it about 3-4 times a week. She is interested in smoking cessation and is amenable to trying medication.      Chronic Pain Follow-Up    Shirley has generalized osteoarthrosis and low back pain of chronic duration and related to degenerative disease of the spine and primary osteoarthritis  Analgesia/pain control: good on current medication      Recent changes:  same      Overall control: Tolerable with discomfort  Activity level/function:      Daily activities:  Can do most things most days, with some rest    Work:  Pain does not limit any  work  Adverse effects:  No  Adherance    Taking medication as directed?  Yes    Participating in other treatments: not applicable  Risk Factors:    Sleep:  Good    Mood/anxiety:  slightly worsened - her  dealing with some issues with legal blindness of recent onset    Recent family or social stressors:  none noted and family illness as above    Other aggravating factors: prolonged sitting and sedentary lifestyle  PHQ-9 SCORE 3/3/2016 3/28/2017   Total Score 7 4     TYRON-7 SCORE 7/7/2016   Total Score 4     Encounter-Level CSA - 03/03/2016:          Controlled Substance Agreement - Scan on 3/3/2016  5:20 PM : CONTROLLED SUBSTANCE AGREEMENT- 3/3/2016 (below)                        Problem list and histories reviewed & adjusted, as indicated.  Additional history: as documented    Labs reviewed in EPIC    Reviewed and updated as needed this visit by clinical staffAllergies       Reviewed and updated as needed this visit by Provider         ROS:  14 point ROS negative except as above      OBJECTIVE:     /62  Pulse 80  Temp 98.1  F (36.7  C) (Oral)  Ht 5' 1\" (1.549 m)  Wt 131 lb 11.2 oz (59.7 kg)  SpO2 99%  BMI 24.88 kg/m2  Body mass index is 24.88 kg/(m^2).  GENERAL: healthy, alert and no distress  NECK: no adenopathy, no asymmetry, masses, or scars and thyroid normal to palpation  RESP: lungs clear to " auscultation - no rales, rhonchi or wheezes  CV: regular rate and rhythm, normal S1 S2, no S3 or S4, no murmur, click or rub, no peripheral edema and peripheral pulses strong  ABDOMEN: tenderness RUQ and bowel sounds normal  MS: no gross musculoskeletal defects noted, no edema    Diagnostic Test Results:  Results for orders placed or performed in visit on 09/01/17   US Abdomen Limited    Narrative    LIMITED ABDOMEN ULTRASOUND  9/1/2017 10:50 AM     HISTORY: Right upper quadrant pain.    FINDINGS:  Liver is normal in echogenicity without focal lesions. The  gallbladder contains gallstones and there is associated gallbladder  wall thickening concerning for cholecystitis, but multiple echogenic  foci are noted in the thickened wall with ringdown artifact likely  representing adenomyomatosis of the gallbladder. Common bile duct is  normal in diameter. Pancreas is normal where visualized. Examination  of the right kidney is unremarkable.      Impression    IMPRESSION:  Probable adenomyomatosis of the gallbladder with  cholelithiasis. Cholecystitis cannot be completely ruled out. No bile  duct dilatation is evident.    TANNA LAMBERT MD         DIAGNOSTICS:                                                        Recent Labs   Lab Test  08/14/17   1028  03/28/17   0922   07/07/16   1318  05/11/16   0840  05/10/16   1208   04/21/16   1400   03/03/16   2358   HGB  14.1   --    --   12.9  11.2*   --    < >  12.1   --    --    PLT  189   --    --   222   --    --    < >  198   --   155   INR   --    --    --    --    --   0.95   --   1.02   --    --    NA  139  137   < >   --    --    --    < >  135   < >   --    POTASSIUM  5.1  5.1   < >   --   4.1   --    < >  4.4   < >   --    CR  0.53  0.62   < >   --    --    --    < >  0.54   < >  0.56   A1C   --    --    --    --   5.4   --    --    --    --   5.6    < > = values in this interval not displayed.        ASSESSMENT/PLAN:     Hyperlipidemia; controlled   Plan:  No changes in  the patient's current treatment plan    Hypertension; controlled   Associated with the following complications:    Heart Disease   Plan:  No changes in the patient's current treatment plan    Depression; recurrent episode-- SITUATIONAL   Associated with the following complications:    None   Plan:  Medications:   Bupropion. - to help treat the symptoms of anxiety and depression and also for smoking cessation    COPD: Moderate = FeV1 < 79% -50%, controlled  Associated with the following complications:  None    Plan:  Medications: See orders, asthma controller reordered today and Shirley was encouraged to use it daily so as to decrease use of rescue inhaler.      COPD education: [email or web address]        IMPRESSION:                                                    Reason for surgery/procedure: symptomatic cholelithiasis    Diagnosis/reason for consult:     1. Calculus of gallbladder with acute on chronic cholecystitis without obstruction    2. Preop general physical exam    3. Adenomyomatosis of gallbladder    4. Primary osteoarthritis involving multiple joints    5. Chronic pain syndrome    6. Tobacco use disorder    7. Chronic obstructive pulmonary disease, unspecified COPD type (H)    8. PAD (peripheral artery disease) (H)    9. Hyperlipidemia LDL goal <70    10. Vitamin D deficiency    11. Neuropathy (H)    12. Scurvy    13. Low iron    14. Essential hypertension, benign    15. Coronary artery disease involving native coronary artery of native heart without angina pectoris    16. Situational stress    17. Need for prophylactic vaccination and inoculation against influenza    18. Special screening for malignant neoplasms, colon    19. Symptomatic cholelithiasis         The proposed surgical procedure is considered INTERMEDIATE risk.    REVISED CARDIAC RISK INDEX  The patient has the following serious cardiovascular risks for perioperative complications such as (MI, PE, VFib and 3  AV Block):  No serious cardiac  risks  INTERPRETATION: 0 risks: Class I (very low risk - 0.4% complication rate)    The patient has the following additional risks for perioperative complications:  No identified additional risks      ICD-10-CM    1. Calculus of gallbladder with acute on chronic cholecystitis without obstruction K80.12 GENERAL SURG ADULT REFERRAL   2. Preop general physical exam Z01.818    3. Adenomyomatosis of gallbladder D13.5 GENERAL SURG ADULT REFERRAL   4. Primary osteoarthritis involving multiple joints M15.0 traMADol (ULTRAM) 50 MG tablet   5. Chronic pain syndrome G89.4    6. Tobacco use disorder F17.200 fluticasone-vilanterol (BREO ELLIPTA) 200-25 MCG/INH oral inhaler     buPROPion (WELLBUTRIN XL) 300 MG 24 hr tablet     DISCONTINUED: buPROPion (WELLBUTRIN XL) 300 MG 24 hr tablet   7. Chronic obstructive pulmonary disease, unspecified COPD type (H) J44.9 fluticasone-vilanterol (BREO ELLIPTA) 200-25 MCG/INH oral inhaler     albuterol (PROAIR HFA/PROVENTIL HFA/VENTOLIN HFA) 108 (90 BASE) MCG/ACT Inhaler   8. PAD (peripheral artery disease) (H) I73.9 atorvastatin (LIPITOR) 80 MG tablet     lisinopril (PRINIVIL/ZESTRIL) 40 MG tablet     ezetimibe (ZETIA) 10 MG tablet   9. Hyperlipidemia LDL goal <70 E78.5 atorvastatin (LIPITOR) 80 MG tablet   10. Vitamin D deficiency E55.9 cholecalciferol 2000 UNITS CAPS     Calcium Carbonate-Vitamin D3 (CALCIUM 600-D) 600-400 MG-UNIT TABS   11. Neuropathy (H) G62.9 cyabnocobalamin (VITAMIN B-12) 2500 MCG sublingual tablet     B Complex Vitamins (B COMPLEX 50) TABS   12. Scurvy E54 Multiple Vitamins-Minerals (AIRBORNE) TBEF   13. Low iron E61.1 Multiple Vitamins-Minerals (AIRBORNE) TBEF   14. Essential hypertension, benign I10 metoprolol (TOPROL-XL) 25 MG 24 hr tablet   15. Coronary artery disease involving native coronary artery of native heart without angina pectoris I25.10 ezetimibe (ZETIA) 10 MG tablet   16. Situational stress F43.9 buPROPion (WELLBUTRIN XL) 300 MG 24 hr tablet      DISCONTINUED: buPROPion (WELLBUTRIN XL) 300 MG 24 hr tablet   17. Need for prophylactic vaccination and inoculation against influenza Z23 FLU VAC, SPLIT VIRUS IM > 3 YO (QUADRIVALENT) [63330]     Vaccine Administration, Initial [51851]     Vaccine Administration, Initial [46307]     FLU VAC, SPLIT VIRUS IM > 3 YO (QUADRIVALENT) [08662]   18. Special screening for malignant neoplasms, colon Z12.11 Fecal colorectal cancer screen FIT   19. Symptomatic cholelithiasis K80.20        RECOMMENDATIONS:                                                        Cardiovascular Risk  Patient is already on a Beta Blocker. Continue Betablocker therapy after surgery, using Beta blocker order set as necessary for NPO status.      Pulmonary Risk    History of COPD.   Lungs currently stable on current medications  Obsevre for post op bronchospasm, treat with albuteorl nebs as needed.           --Patient is to take all scheduled medications on the day of surgery EXCEPT for modifications listed below.    Anticoagulant or Antiplatelet Medication Use  No ASA or NSAIDs within 7 days of surgery         ACE Inhibitor or Angiotensin Receptor Blocker (ARB) Use  Ace inhibitor or Angiotensin Receptor Blocker (ARB) and should HOLD this medication for the 24 hours prior to surgery.          APPROVAL GIVEN to proceed with proposed procedure, without further diagnostic evaluation       Signed Electronically by: Eun Narvaez MD            Immunizations: Influenza vaccination administered today    DIAGNOSIS/PLAN:     ICD-10-CM    1. Calculus of gallbladder with acute on chronic cholecystitis without obstruction K80.12 GENERAL SURG ADULT REFERRAL   2. Adenomyomatosis of gallbladder D13.5 GENERAL SURG ADULT REFERRAL   3. Primary osteoarthritis involving multiple joints M15.0 traMADol (ULTRAM) 50 MG tablet   4. Chronic pain syndrome G89.4    5. Tobacco use disorder F17.200 fluticasone-vilanterol (BREO ELLIPTA) 200-25 MCG/INH oral inhaler      buPROPion (WELLBUTRIN XL) 300 MG 24 hr tablet     DISCONTINUED: buPROPion (WELLBUTRIN XL) 300 MG 24 hr tablet   6. Chronic obstructive pulmonary disease, unspecified COPD type (H) J44.9 fluticasone-vilanterol (BREO ELLIPTA) 200-25 MCG/INH oral inhaler     albuterol (PROAIR HFA/PROVENTIL HFA/VENTOLIN HFA) 108 (90 BASE) MCG/ACT Inhaler   7. PAD (peripheral artery disease) (H) I73.9 atorvastatin (LIPITOR) 80 MG tablet     lisinopril (PRINIVIL/ZESTRIL) 40 MG tablet     ezetimibe (ZETIA) 10 MG tablet   8. Hyperlipidemia LDL goal <70 E78.5 atorvastatin (LIPITOR) 80 MG tablet   9. Vitamin D deficiency E55.9 cholecalciferol 2000 UNITS CAPS     Calcium Carbonate-Vitamin D3 (CALCIUM 600-D) 600-400 MG-UNIT TABS   10. Neuropathy (H) G62.9 cyabnocobalamin (VITAMIN B-12) 2500 MCG sublingual tablet     B Complex Vitamins (B COMPLEX 50) TABS   11. Scurvy E54 Multiple Vitamins-Minerals (AIRBORNE) TBEF   12. Low iron E61.1 Multiple Vitamins-Minerals (AIRBORNE) TBEF   13. Essential hypertension, benign I10 metoprolol (TOPROL-XL) 25 MG 24 hr tablet   14. Coronary artery disease involving native coronary artery of native heart without angina pectoris I25.10 ezetimibe (ZETIA) 10 MG tablet   15. Situational stress F43.9 buPROPion (WELLBUTRIN XL) 300 MG 24 hr tablet     DISCONTINUED: buPROPion (WELLBUTRIN XL) 300 MG 24 hr tablet   16. Need for prophylactic vaccination and inoculation against influenza Z23 FLU VAC, SPLIT VIRUS IM > 3 YO (QUADRIVALENT) [76565]     Vaccine Administration, Initial [67257]       SIGNIFICANT ISSUES RE The primary encounter diagnosis was Calculus of gallbladder with acute on chronic cholecystitis without obstruction. Diagnoses of Adenomyomatosis of gallbladder, Primary osteoarthritis involving multiple joints, Chronic pain syndrome, Tobacco use disorder, Chronic obstructive pulmonary disease, unspecified COPD type (H), PAD (peripheral artery disease) (H), Hyperlipidemia LDL goal <70, Vitamin D deficiency,  Neuropathy (H), Scurvy, Low iron, Essential hypertension, benign, Coronary artery disease involving native coronary artery of native heart without angina pectoris, Situational stress, and Need for prophylactic vaccination and inoculation against influenza were also pertinent to this visit. AS NOTED AND ADDRESSED ABOVE   MEDS AND AND LABS AS ORDERED TO ADDRESS PREVIOUS AND CURRENT ABNORMAL INDICES    Shirley is actually pretty stable at this time. Her breathing is close to baseline but she is using her rescue inhaler every day and is not on a controller. Controller inhaler was reordered today and she is aware that she needs to pick it up.  She is very motivated with regard to smoking cessation at this time and is amenable to trying Wellbutrin as noted. She will return to the clinic in about 2-3 months for follow-up regarding this as she is also taking the Wellbutrin to treat situational stress.    She also has extensive vascular  Disease which is stable at this time. She will continue follow-up with Dr. Chadwick Lozano.    She had imaging done due to complaints of right upper quadrant swelling and pain. This was significant for gallbladder findings as noted. She will likely need cholecystectomy. A referral was placed for surgical consultation today.      She is also due for FIT test screening. She will  a screening kit from the lab today.    SEE PATIENT INSTRUCTION SECTION FOR FOLLOW UP PLAN    Shirley IS TO CONTINUE OTHER TREATMENT REGIMEN/PLANS EXCEPT AS INDICATED    COMPLIANCE WITH MEDICATIONS DIET AND EXERCISE PLANS ENCOURAGED    DISCONTINUED MEDS:  Medications Discontinued During This Encounter   Medication Reason     traMADol (ULTRAM) 50 MG tablet Reorder     fluticasone-vilanterol (BREO ELLIPTA) 200-25 MCG/INH oral inhaler Reorder     atorvastatin (LIPITOR) 80 MG tablet Reorder     cholecalciferol 2000 UNITS CAPS Reorder     cyabnocobalamin (VITAMIN B-12) 2500 MCG sublingual tablet Reorder     Multiple  Vitamins-Minerals (AIRBORNE) TBEF Reorder     albuterol (PROAIR HFA/PROVENTIL HFA/VENTOLIN HFA) 108 (90 BASE) MCG/ACT Inhaler Reorder     metoprolol (TOPROL-XL) 25 MG 24 hr tablet Reorder     B Complex Vitamins (B COMPLEX 50) TABS Reorder     lisinopril (PRINIVIL/ZESTRIL) 40 MG tablet Reorder     ezetimibe (ZETIA) 10 MG tablet Reorder     CALCIUM 600-D 600-400 MG-UNIT TABS Reorder     buPROPion (WELLBUTRIN XL) 300 MG 24 hr tablet Reorder       CURRENT MED LIST WITH CHANGES AS NOTED BELOW:  Current Outpatient Prescriptions   Medication Sig Dispense Refill     traMADol (ULTRAM) 50 MG tablet Take 1 tablet (50 mg) by mouth every 6 hours as needed for moderate pain 120 tablet 3     fluticasone-vilanterol (BREO ELLIPTA) 200-25 MCG/INH oral inhaler Inhale 1 puff into the lungs daily INDICATION: COPD CONTROLLER 1 Inhaler 11     atorvastatin (LIPITOR) 80 MG tablet TAKE ONE TABLET (80MG) BY MOUTH EVERY EVENING INDICATION:TO LOWER CHOLESTEROL AND TO HELP REDUCE RISK OF REOCURRENCE OF HEAR ATTACK STROKE,A 90 tablet 2     cholecalciferol 2000 UNITS CAPS Take 2 capsules by mouth daily FOR VITAMIN D DEFICIENCY (LOW VITAMIN D) 100 capsule PRN     cyabnocobalamin (VITAMIN B-12) 2500 MCG sublingual tablet Take 2,500 mcg by mouth daily INDICATION: FOR VITAMIN B12 SUPPLEMENTATION - TO IMPROVE MEMORY, BALANCE, SLEEP AND MOOD, DIRECTIONS: PLEASE PLACE UNDER THE TONGUE TO DISSOLVE 100 tablet 3     Multiple Vitamins-Minerals (AIRBORNE) TBEF Take 1 tablet by mouth every morning INDICATION: VITAMIN C SUPPLEMENT 30 tablet 11     albuterol (PROAIR HFA/PROVENTIL HFA/VENTOLIN HFA) 108 (90 BASE) MCG/ACT Inhaler Inhale 2 puffs into the lungs every 6 hours as needed for shortness of breath / dyspnea or wheezing 1 Inhaler prn     metoprolol (TOPROL-XL) 25 MG 24 hr tablet Take 0.5 tablets (12.5 mg) by mouth 2 times daily INDICATION:TO LOWER BLOOD PRESSURE AND TO HELP PREVENT HEART DISEASE 90 tablet 3     B Complex Vitamins (B COMPLEX 50) TABS Take 1  tablet by mouth every other day INDICATION: VITAMIN SUPPLEMENT 100 tablet PRN     lisinopril (PRINIVIL/ZESTRIL) 40 MG tablet Take 1 tablet (40 mg) by mouth daily INDICATION:TO LOWER BLOOD PRESSURE AND TO PRESERVE KIDNEY FUNCTION 90 tablet 2     ezetimibe (ZETIA) 10 MG tablet TAKE 1 TABLET (10 MG) BY MOUTH DAILY INDICATION: TO LOWER CHOLESTEROL 90 tablet PRN     Calcium Carbonate-Vitamin D3 (CALCIUM 600-D) 600-400 MG-UNIT TABS Take 1 tablet by mouth 2 times daily (with meals) 100 tablet PRN     buPROPion (WELLBUTRIN XL) 300 MG 24 hr tablet Take 1 tablet (300 mg) by mouth every morning INDICATION: SMOKING CESSATION AND SITUATIONAL STRESS 90 tablet 3     nitroGLYcerin (NITROSTAT) 0.4 MG sublingual tablet Place 1 tablet (0.4 mg) under the tongue See Admin Instructions for chest pain 15 tablet 7     loratadine (CLARITIN) 10 MG tablet Take 1 tablet (10 mg) by mouth daily INDICATION: TO CONTROL ALLERGY SYMPTOMS 90 tablet 3     clopidogrel (PLAVIX) 75 MG tablet TAKE ONE TABLET BY MOUTH ONE TIME DAILY  90 tablet 0     omeprazole (PRILOSEC) 20 MG CR capsule TAKE 1 CAPSULE (20 MG) BY MOUTH DAILY INDICATION: TO CONTROL REFLUX SYMPTOMS 90 capsule 2     Acetaminophen (TYLENOL PO) Take 500-1,000 mg by mouth every 6 hours as needed for mild pain or fever       Omega-3 Fatty Acids (OMEGA-3 FISH OIL PO) Take 2 capsules by mouth 2 times daily       ASPIRIN EC PO Take 81 mg by mouth daily       ISOSORBIDE DINITRATE PO Take 30 mg by mouth every morning Verified with Cub and patient med bottle as 30 mg Dinitrate daily.       denosumab (PROLIA) 60 MG/ML SOLN Inject 1 mL (60 mg) Subcutaneous every 6 months INDICATION: TO TREAT OSTEOPOROSIS 1 Syringe PRN     ORDER FOR DME Equipment being ordered: BP MACHINE WITH PULSE MONITOR 1 Device PRN     denosumab (PROLIA) 60 MG/ML SOLN injection Inject 1 mL (60 mg) Subcutaneous once for 1 dose 1 mL 0     [DISCONTINUED] buPROPion (WELLBUTRIN XL) 300 MG 24 hr tablet Take 1 tablet (300 mg) by mouth every  morning INDICATION: SMOKING CESSATION 90 tablet 3     [DISCONTINUED] atorvastatin (LIPITOR) 80 MG tablet TAKE ONE TABLET (80MG) BY MOUTH EVERY EVENING INDICATION:TO LOWER CHOLESTEROL AND TO HELP REDUCE RISK OF REOCURRENCE OF HEAR ATTACK STROKE,A 90 tablet 2     [DISCONTINUED] albuterol (PROAIR HFA/PROVENTIL HFA/VENTOLIN HFA) 108 (90 BASE) MCG/ACT Inhaler Inhale 2 puffs into the lungs every 6 hours as needed for shortness of breath / dyspnea or wheezing 1 Inhaler prn     [DISCONTINUED] ezetimibe (ZETIA) 10 MG tablet TAKE 1 TABLET (10 MG) BY MOUTH DAILY INDICATION: TO LOWER CHOLESTEROL 90 tablet PRN     [DISCONTINUED] lisinopril (PRINIVIL/ZESTRIL) 40 MG tablet Take 1 tablet (40 mg) by mouth daily INDICATION:TO LOWER BLOOD PRESSURE AND TO PRESERVE KIDNEY FUNCTION 90 tablet 2     [DISCONTINUED] metoprolol (TOPROL-XL) 25 MG 24 hr tablet Take 0.5 tablets (12.5 mg) by mouth 2 times daily INDICATION:TO LOWER BLOOD PRESSURE AND TO HELP PREVENT HEART DISEASE 90 tablet 3         Office visit time > 40 mins, greater than 50% of which was spent obtaining history, reviewing medications, counseling re compliance with treatment plan, discussion of treatment, follow up plans, and coordination of care.       Patient Instructions   ** FOLLOW UP PLAN**:    PLEASE SCHEDULE OFFICE VISIT WITH DR. ISIS PANDA 2 MONTHS FROM TODAY TO FOLLOW UP ON  SITUATIONAL ANXIETY, SMOKING CESSATION AND ALSO TO ESTABLISH CARE      YOU HAVE BEEN REFERRED FOR SURGICAL CONSULTATION TO ADDRESS ISSUES RAISED TODAY REGARDING GALLBLADDER DISEASE    PLEASE  MAKE SURE TO SCHEDULE YOUR APPOINTMENT(S) BY TELEPHONE      YOU MAY CONTACT THE CLINIC IF ANY QUESTIONS OR CONCERNS -[phone] OR VIA JOSEPH Narvaez MD  [facility]  Injectable Influenza Immunization Documentation    1.  Are you sick today? (Fever of 100.5 or higher on the day of the clinic)   No    2.  Have you ever had Guillain-[place] Syndrome within 6  weeks of an influenza vaccionation?  No    3. Do you have a life-threatening allergy to eggs?  No    4. Do you have a life-threatening allergy to a component of the vaccine? May include antibiotics, gelatin or latex.  No     5. Have you ever had a reaction to a dose of flu vaccine that needed immediate medical attention?  No     Form completed by Valencia Neumann MA         Copy of this evaluation report is provided to requesting physician.    [place] Preop Guidelines

## 2017-09-07 NOTE — PROGRESS NOTES
Injectable Influenza Immunization Documentation    1.  Are you sick today? (Fever of 100.5 or higher on the day of the clinic)   No    2.  Have you ever had Guillain-[place] Syndrome within 6 weeks of an influenza vaccionation?  No    3. Do you have a life-threatening allergy to eggs?  No    4. Do you have a life-threatening allergy to a component of the vaccine? May include antibiotics, gelatin or latex.  No     5. Have you ever had a reaction to a dose of flu vaccine that needed immediate medical attention?  No     Form completed by[employer]

## 2017-09-10 ENCOUNTER — APPOINTMENT (OUTPATIENT)
Dept: GENERAL RADIOLOGY | Facility: CLINIC | Age: 57
End: 2017-09-10
Attending: PHYSICIAN ASSISTANT
Payer: COMMERCIAL

## 2017-09-10 ENCOUNTER — HOSPITAL ENCOUNTER (EMERGENCY)
Facility: CLINIC | Age: 57
Discharge: HOME | End: 2017-09-10
Attending: PHYSICIAN ASSISTANT | Admitting: PHYSICIAN ASSISTANT
Payer: COMMERCIAL

## 2017-09-10 VITALS
OXYGEN SATURATION: 95 % | RESPIRATION RATE: 18 BRPM | DIASTOLIC BLOOD PRESSURE: 98 MMHG | TEMPERATURE: 98.1 F | SYSTOLIC BLOOD PRESSURE: 178 MMHG | BODY MASS INDEX: 24.75 KG/M2 | HEART RATE: 64 BPM | WEIGHT: 131 LBS

## 2017-09-10 DIAGNOSIS — S80.212A ABRASION OF LEFT KNEE, INITIAL ENCOUNTER: ICD-10-CM

## 2017-09-10 DIAGNOSIS — S93.401A SPRAIN OF RIGHT ANKLE, UNSPECIFIED LIGAMENT, INITIAL ENCOUNTER: ICD-10-CM

## 2017-09-10 PROCEDURE — 73610 X-RAY EXAM OF ANKLE: CPT | Mod: RT

## 2017-09-10 PROCEDURE — 25000132 ZZH RX MED GY IP 250 OP 250 PS 637: Performed by: PHYSICIAN ASSISTANT

## 2017-09-10 PROCEDURE — 73560 X-RAY EXAM OF KNEE 1 OR 2: CPT | Mod: LT

## 2017-09-10 PROCEDURE — 99284 EMERGENCY DEPT VISIT MOD MDM: CPT

## 2017-09-10 RX ORDER — OXYCODONE HYDROCHLORIDE 5 MG/1
5-10 TABLET ORAL EVERY 6 HOURS PRN
Qty: 15 TABLET | Refills: 0 | Status: ON HOLD | OUTPATIENT
Start: 2017-09-10 | End: 2017-09-27

## 2017-09-10 RX ORDER — OXYCODONE HYDROCHLORIDE 5 MG/1
5 TABLET ORAL ONCE
Status: COMPLETED | OUTPATIENT
Start: 2017-09-10 | End: 2017-09-10

## 2017-09-10 RX ADMIN — OXYCODONE HYDROCHLORIDE 5 MG: 5 TABLET ORAL at 14:26

## 2017-09-10 ASSESSMENT — ENCOUNTER SYMPTOMS
WOUND: 1
HEADACHES: 0
JOINT SWELLING: 1
ARTHRALGIAS: 1
NUMBNESS: 0
WEAKNESS: 0

## 2017-09-10 NOTE — ED PROVIDER NOTES
History     Chief Complaint:  Ankle Injury    HPI   Shirley Hendricks is a 56 year old female with a history of hypertension, myocardial infarction, discoid lupus, coronary artery disease, COPD, stroke, and hyperlipidemia on Plavix who presents with an ankle injury. The patient reports she was walking along a sidewalk when she looked up, took a step forward, and rolled her right ankle off the curb. She is unsure if the ankle rolled inward or outward, but she fell down. She notes she did not hit her head, but has a small wound to her left knee. She has pain and swelling to the right ankle laterally. She denies numbness or weakness in the right ankle or foot, and denies hitting her head or loss of consciousness upon falling. The patient reports she has not taken any pain medication following the incident, but she did take 3 Tylenol a few hours ago due to a headache.     Allergies:  Contrast Dye  Pantoprazole  Pencillins    Medications:    traMADol (ULTRAM) 50 MG tablet  fluticasone-vilanterol (BREO ELLIPTA) 200-25 MCG/INH oral inhaler  atorvastatin (LIPITOR) 80 MG tablet  cholecalciferol 2000 UNITS CAPS  cyabnocobalamin (VITAMIN B-12) 2500 MCG sublingual tablet  Multiple Vitamins-Minerals (AIRBORNE) TBEF  albuterol (PROAIR HFA/PROVENTIL HFA/VENTOLIN HFA) 108 (90 BASE) MCG/ACT Inhaler  metoprolol (TOPROL-XL) 25 MG 24 hr tablet  B Complex Vitamins (B COMPLEX 50) TABS  lisinopril (PRINIVIL/ZESTRIL) 40 MG tablet  ezetimibe (ZETIA) 10 MG tablet  Calcium Carbonate-Vitamin D3 (CALCIUM 600-D) 600-400 MG-UNIT TABS  buPROPion (WELLBUTRIN XL) 300 MG 24 hr tablet  nitroGLYcerin (NITROSTAT) 0.4 MG sublingual tablet  loratadine (CLARITIN) 10 MG tablet  clopidogrel (PLAVIX) 75 MG tablet  omeprazole (PRILOSEC) 20 MG CR capsule  Acetaminophen (TYLENOL PO)  Omega-3 Fatty Acids (OMEGA-3 FISH OIL PO)  ASPIRIN EC PO  ISOSORBIDE DINITRATE PO  denosumab (PROLIA) 60 MG/ML SOLN    Past Medical History:    Discoid lupus erythematosus of  eyelid  COPD  Coronary artery disease  Embolism and thrombosis of unspecified artery  Hyperlipidemia  Hypertension  Myocardial infarction  Osteoarthritis  Peripheral artery disease  Peripheral vascular disease  Reflux esophagitis  Stroke  Uncomplicated asthma    Past Surgical History:    Angiogram  Angioplasty stent right femoral artery  Sinus surgery  Endarterectomy, femoral artery  Stent placement left common iliac and external iliac arteries  Cardiac catheterization  Endarterectomy carotid  GYN surgery, left tube  Orthopedic surgery, left knee  Vascular surgery    Family History:    Bladder cancer  Myocardial infarction  Clotting disorder    Social History:  Smoking status: Yes, 0.25 packs per day  Alcohol use: Yes, occasional  Presents to the ED with her daughter  Marital Status:   [2]    Review of Systems   Musculoskeletal: Positive for arthralgias and joint swelling.   Skin: Positive for wound.   Neurological: Negative for syncope, weakness, numbness and headaches.   All other systems reviewed and are negative.      Physical Exam     Patient Vitals for the past 24 hrs:   BP Temp Temp src Pulse Heart Rate Resp SpO2 Weight   09/10/17 1410 (!) 178/98 98.1  F (36.7  C) Oral 64 64 18 95 % 59.4 kg (131 lb)        Physical Exam  General:         Resting comfortably on the gurney.     Head:              The scalp, head and face appear normal. No evidence of trauma.    Resp:              Non-labored breathing. No tachypnea.    CV:                  DP and PT pulses intact and symmetric.                            Capillary refill less than two seconds bilateral toes  MS:                  Normal muscular tone.                            5/5 strength with left dorsi- and plantar-flexion at the great toe and ankle, knee flexion and extension; 5/5 strength with right dorsi and plantar flexion at the great toe, 3/5 at the ankle due to pain.                          Normal ROM with left dorsi- and plantar-flexion at  the great toe and ankle; Normal ROM with right dorsi and plantarflexion at the great toe, limited at the ankle due to pain.                            Right lateral ankle tenderness and swelling over the lateral malleoli with palpation, no palpable deformity.  The remainder of the right lower extremity is nontender to palpation.  No laxity with stress on the ankle ligaments.  Compartments are soft.    Mild tenderness to the anterior left knee near the abrasion, no significant swelling, no palpable deformity.  The remainder of the left lower extremity is nontender to palpation.  Compartments are soft.   Neuro:            Awake and alert.                            Sensation intact to light touch bilateral lower extremities, including distal to the injury.    Skin:               Small superficial abrasion to the left anterior knee with no active bleeding, otherwise no rash, ecchymosis, abrasions or lacerations.   Psych:             Normal affect. Appropriate interactions.       Emergency Department Course     Imaging:  Radiographic findings were communicated with the patient who voiced understanding of the findings.    Knee XR, 1-2 views, left:  Negative.  As read by Radiology.    Ankle XR, G/E 3 views, right:  Soft tissue swelling. No fracture.  As read by Radiology.    Interventions:  1426: 5 mg Oxycodone PO    Emergency Department Course:  Past medical records, nursing notes, and vitals reviewed.  1413: I performed an exam of the patient and obtained history, as documented above.  The patient was sent for a knee x-ray and an ankle x-ray while in the emergency department, findings above.    1506: I rechecked the patient. Explained findings to the patient and her daughter.    1516: The patient was placed in an ace bandage and AirSplint     I rechecked the patient. Findings and plan explained to the Patient. Patient discharged home with instructions regarding supportive care, medications, and reasons to return. The  importance of close follow-up was reviewed.     Impression & Plan      Medical Decision Making:  Shirley Hendricks is a 56 year old female who presents for evaluation of ankle pain. Signs and symptoms are consistent with an ankle sprain. This involves the lateral ankle by clinical exam. No signs of septic arthritis, gout, pseudogout, fracture, cellulitis, etc. There are no signs of fracture. She also has an abrasion to the left knee. The patients neurovascular status is normal/ CMS intact distally bilaterally.  No other areas of pain or injury to require further emergent evaluation.  Plan is for protected weightbearing, RICE treatment,Airsplint and ace wrap. Patient will advance weightbearing and follow-up with primary in 3-5 days. Limited supply of Oxycodone given for break through pain, narcotic precautions given. She was recently given a prescription for Tramadol for her cholelithiasis pain, and understands/ it was discussed that the Oxycodone cannot be taken in addition to that. If she is not getting relief from the Tramadol for her ankle, she can try the Oxycodone.  Ankle sprain and abrasion/ wound instructions provided for home. She will return to the emergency department with signs of infection, worsening pain, numbness, tingling, if she becomes worse in any way. I discussed the results, plan and any additional questions with the patient. She verbalized understanding and agreement with the plan.      Diagnosis:    ICD-10-CM   1. Sprain of right ankle, unspecified ligament, initial encounter S93.401A   2. Abrasion of left knee, initial encounter S80.212A     Disposition: Discharged to home    Discharge Medications:  New Prescriptions    OXYCODONE (ROXICODONE) 5 MG IR TABLET    Take 1-2 tablets (5-10 mg) by mouth every 6 hours as needed for pain     Yanci Mclain  9/10/2017    EMERGENCY DEPARTMENT    I, Yanci Mclain, am serving as a scribe at 2:13 PM on 9/10/2017 to document services personally performed  by Ana Paula Sawant PA-C based on my observations and the provider's statements to me.          Ana Paula Sawant PA-C  09/11/17 0056

## 2017-09-10 NOTE — ED AVS SNAPSHOT
Emergency Department    [address] [place] [id number]    Phone:  [phone]    Fax:  [phone]                                       Shirley Hendricks   MRN: [id number]    Department:   Emergency Department   Date of Visit:  9/10/2017           After Visit Summary Signature Page     I have received my discharge instructions, and my questions have been answered. I have discussed any challenges I see with this plan with the nurse or doctor.    ..........................................................................................................................................  Patient/Patient Representative Signature      ..........................................................................................................................................  Patient Representative Print Name and Relationship to Patient    ..................................................               ................................................  Date                                            Time    ..........................................................................................................................................  Reviewed by Signature/Title    ...................................................              ..............................................  Date                                                            Time

## 2017-09-10 NOTE — ED AVS SNAPSHOT
Emergency Department    [address] [facility] [address]    Phone:  [phone]    Fax:  [phone]                                       Shirley Hendricks   MRN: [id number]    Department:   Emergency Department   Date of Visit:  9/10/2017           Patient Information     Date Of Birth          1960        Your diagnoses for this visit were:     Sprain of right ankle, unspecified ligament, initial encounter     Abrasion of left knee, initial encounter        You were seen by Ana Paula Sawant PA-C.      Follow-up Information     Follow up with Eun Narvaez MD In 4 days.    Specialties:  Internal Medicine, Pediatrics    Contact information:    [address] [facility] [address]  [phone]          Follow up with  Emergency Department.    Specialty:  EMERGENCY MEDICINE    Why:  If symptoms worsen    Contact information:    [address] [facility] [address] [phone]        Discharge Instructions       Discharge Instructions  Ankle Sprain    An ankle sprain is a stretching or tearing of a ligament around your ankle joint. In most cases, we recommend resting the ankle for about 3 days, followed by return to activity. Some severe sprains need longer periods of rest, or can require a cast or boot to immobilize them.    Return to the Emergency Department if:    Your pain is much worse, or if there is pain in a new area.    Your foot or leg becomes pale, cool, blue, or numb or tingling.    There is anything concerning to you about how your ankle looks.    Any splint or device is feeling too tight, causing pain, or rubbing into your skin.    Follow-up with your doctor:    As recommended by your emergency physician.    If your ankle is not back to normal within about 1 week.    If you are involved in significant athletic activities.        Treatment:    Apply ice your injured area for 15 minutes at a time, at least 3 times a day for the first 1-2 days. Use  a cloth between the ice bag and your skin to prevent frostbite.     Do not sleep with an ice pack or heating pad on, since this can cause burns or skin injury.    Raise the injured area above the level of your heart as much as possible in the first 1-2 days.    Pain medications -- You may take a pain medication such as Tylenol  (acetaminophen), Advil , Nuprin  (ibuprofen) or Aleve  (naproxen).  If you have been given a narcotic such as Vicodin  (hydrocodone with acetaminophen), Percocet  (oxycodone with acetaminophen), or codeine, do not drive for four hours after you have taken it. If the narcotic contains Tylenol  (acetaminophen), do not take Tylenol  with it. All narcotics will cause constipation, so eat a high fiber diet.      Splint. We often give a stirrup-shaped ankle splint to support your ankle and prevent it from turning again. Wear this all the time for the first 3-5 days, and then as directed by your doctor.    Crutches. If you can t put wait on the ankle without a lot of pain, we recommend crutches. You can put as much weight on the ankle as possible without severe pain.     Compression. An elastic bandage (Ace  wrap) can help with pain and swelling. Remove this at least twice a day, and leave it off for several hours if you develop swelling of the foot.   If you were given a prescription for medicine here today, be sure to read all of the information (including the package insert) that comes with your prescription.  This will include important information about the medicine, its side effects, and any warnings that you need to know about.  The pharmacist who fills the prescription can provide more information and answer questions you may have about the medicine.  If you have questions or concerns that the pharmacist cannot address, please call or return to the Emergency Department.  Opioid Medication Information    Pain medications are among the most commonly prescribed medicines, so we are including  this information for all our patients. If you did not receive pain medication or get a prescription for pain medicine, you can ignore it.     You may have been given a prescription for an opioid (narcotic) pain medicine and/or have received a pain medicine while here in the Emergency Department. These medicines can make you drowsy or impaired. You must not drive, operate dangerous equipment, or engage in any other dangerous activities while taking these medications. If you drive while taking these medications, you could be arrested for DUI, or driving under the influence. Do not drink any alcohol while you are taking these medications.     Opioid pain medications can cause addiction. If you have a history of chemical dependency of any type, you are at a higher risk of becoming addicted to pain medications.  Only take these prescribed medications to treat your pain when all other options have been tried. Take it for as short a time and as few doses as possible. Store your pain pills in a secure place, as they are frequently stolen and provide a dangerous opportunity for children or visitors in your house to start abusing these powerful medications. We will not replace any lost or stolen medicine.  As soon as your pain is better, you should flush all your remaining medication.     Many prescription pain medications contain Tylenol  (acetaminophen), including Vicodin , Tylenol #3 , Norco , Lortab , and Percocet .  You should not take any extra pills of Tylenol  if you are using these prescription medications or you can get very sick.  Do not ever take more than 3000 mg of acetaminophen in any 24 hour period.    All opioids tend to cause constipation. Drink plenty of water and eat foods that have a lot of fiber, such as fruits, vegetables, prune juice, apple juice and high fiber cereal.  Take a laxative if you don t move your bowels at least every other day. Miralax , Milk of Magnesia, Colace , or Senna  can be used to  keep you regular.      Remember that you can always come back to the Emergency Department if you are not able to see your regular doctor in the amount of time listed above, if you get any new symptoms, or if there is anything that worries you.      Abrasions  Abrasions are skin scrapes. Their treatment depends on how large and deep the abrasion is.  Home care  You may be prescribed an antibiotic cream or ointment to apply to the wound. This helps prevent infection. Follow instructions when using this medicine.  General care    To care for the abrasion, do the following each day for as long as directed by your healthcare provider.    If you were given a bandage, change it once a day. If your bandage sticks to the wound, soak it in warm water until it loosens.    Wash the area with soap and warm water. You may do this in a sink or under a tub faucet or shower. Rinse off the soap. Then pat the area dry with a clean towel.    If antibiotic ointment or cream was prescribed, reapply it to the wound as directed. Cover the wound with a fresh nonstick bandage. If the bandage becomes wet or dirty, change it as soon as possible.    Some antibiotic ointments or cream can cause an allergic reaction or dermatitis. This may cause redness, itching and or hives. If this occurs, stop using the ointment immediately and wash off any remaining ointment. You may need to take some allergy medicine to relieve symptoms.    You may use acetaminophen or ibuprofen to control pain unless another pain medicine was prescribed. Talk with your healthcare provider before using these medicines if you have chronic liver or kidney disease or ever had a stomach ulcer or GI bleeding. Don t use ibuprofen in children younger than six months old.    Most skin wounds heal within 10 days. But an infection may occur even with treatment. So it s important to watch the wound for signs of infection as listed below.  Follow-up care  Follow up with your healthcare  provider, or as advised.  When to seek medical advice  Call your healthcare provider right away if any of these occur:    Fever of 100.4 F (38 C) or higher, or as directed by your healthcare provider    Increasing pain, redness, swelling, or drainage from the wound    Bleeding from the wound that does not stop after a few minutes of steady, firm pressure    Decreased ability to move any body part near the wound  Date Last Reviewed: 3/3/2017    9610-5133 [employer]. [address] [place] [address]. All rights reserved. This information is not intended as a substitute for professional medical care. Always follow your healthcare professional's instructions.          Future Appointments        Provider Department Dept Phone Center    9/14/2017 1:15 PM Jacoby Tapia MD, MD [facility] [phone] [facility]    11/7/2017 9:30 AM Vasquez Benoit MD [facility] [phone]     2/15/2018 9:45 AM [facility] VASCULAR ULTRASOUND ROOM 2 [facility] [phone] [facility]    2/15/2018 10:30 AM [facility] VASCULAR ULTRASOUND ROOM 2 [facility] [phone] [facility]    2/15/2018 11:15 AM [facility] VASCULAR ULTRASOUND ROOM 2 [facility] [phone] [facility]    2/15/2018 11:45 AM Chadwick Lozano MD [facility] [place] [phone] [facility]      24 Hour Appointment Hotline       To make an appointment at any [facility], call [phone] ([phone]). If you don't have a family doctor or clinic, we will help you find one. [facility] are conveniently located to serve the needs of you and your family.             Review of your medicines      START taking        Dose / Directions Last dose taken    oxyCODONE 5 MG IR tablet   Commonly known as:  ROXICODONE   Dose:  5-10 mg   Quantity:  15 tablet        Take 1-2 tablets (5-10 mg) by mouth every 6 hours as needed  for pain   Refills:  0          Our records show that you are taking the medicines listed below. If these are incorrect, please call your family doctor or clinic.        Dose / Directions Last dose taken    AIRBORNE Tbef   Dose:  1 tablet   Quantity:  30 tablet        Take 1 tablet by mouth every morning INDICATION: VITAMIN C SUPPLEMENT   Refills:  11        albuterol 108 (90 BASE) MCG/ACT Inhaler   Commonly known as:  PROAIR HFA/PROVENTIL HFA/VENTOLIN HFA   Dose:  2 puff   Quantity:  1 Inhaler        Inhale 2 puffs into the lungs every 6 hours as needed for shortness of breath / dyspnea or wheezing   Refills:  prn        ASPIRIN EC PO   Dose:  81 mg        Take 81 mg by mouth daily   Refills:  0        atorvastatin 80 MG tablet   Commonly known as:  LIPITOR   Quantity:  90 tablet        TAKE ONE TABLET (80MG) BY MOUTH EVERY EVENING INDICATION:TO LOWER CHOLESTEROL AND TO HELP REDUCE RISK OF REOCURRENCE OF HEAR ATTACK STROKE,A   Refills:  2        B COMPLEX 50 Tabs   Dose:  1 tablet   Quantity:  100 tablet        Take 1 tablet by mouth every other day INDICATION: VITAMIN SUPPLEMENT   Refills:  PRN        buPROPion 300 MG 24 hr tablet   Commonly known as:  WELLBUTRIN XL   Dose:  300 mg   Quantity:  90 tablet        Take 1 tablet (300 mg) by mouth every morning INDICATION: SMOKING CESSATION AND SITUATIONAL STRESS   Refills:  3        Calcium Carbonate-Vitamin D3 600-400 MG-UNIT Tabs   Commonly known as:  CALCIUM 600-D   Dose:  1 tablet   Quantity:  100 tablet        Take 1 tablet by mouth 2 times daily (with meals)   Refills:  PRN        cholecalciferol 2000 UNITS Caps   Dose:  2 capsule   Quantity:  100 capsule        Take 2 capsules by mouth daily FOR VITAMIN D DEFICIENCY (LOW VITAMIN D)   Refills:  PRN        clopidogrel 75 MG tablet   Commonly known as:  PLAVIX   Quantity:  90 tablet        TAKE ONE TABLET BY MOUTH ONE TIME DAILY   Refills:  0        cyabnocobalamin 2500 MCG sublingual tablet   Commonly known as:   VITAMIN B-12   Dose:  2500 mcg   Quantity:  100 tablet        Take 2,500 mcg by mouth daily INDICATION: FOR VITAMIN B12 SUPPLEMENTATION - TO IMPROVE MEMORY, BALANCE, SLEEP AND MOOD, DIRECTIONS: PLEASE PLACE UNDER THE TONGUE TO DISSOLVE   Refills:  3        denosumab 60 MG/ML Soln injection   Commonly known as:  PROLIA   Dose:  60 mg   Quantity:  1 Syringe        Inject 1 mL (60 mg) Subcutaneous every 6 months INDICATION: TO TREAT OSTEOPOROSIS   Refills:  PRN        ezetimibe 10 MG tablet   Commonly known as:  ZETIA   Quantity:  90 tablet        TAKE 1 TABLET (10 MG) BY MOUTH DAILY INDICATION: TO LOWER CHOLESTEROL   Refills:  PRN        fluticasone-vilanterol 200-25 MCG/INH oral inhaler   Commonly known as:  BREO ELLIPTA   Dose:  1 puff   Quantity:  1 Inhaler        Inhale 1 puff into the lungs daily INDICATION: COPD CONTROLLER   Refills:  11        ISOSORBIDE DINITRATE PO   Dose:  30 mg        Take 30 mg by mouth every morning Verified with Cub and patient med bottle as 30 mg Dinitrate daily.   Refills:  0        lisinopril 40 MG tablet   Commonly known as:  PRINIVIL/ZESTRIL   Dose:  40 mg   Quantity:  90 tablet        Take 1 tablet (40 mg) by mouth daily INDICATION:TO LOWER BLOOD PRESSURE AND TO PRESERVE KIDNEY FUNCTION   Refills:  2        loratadine 10 MG tablet   Commonly known as:  CLARITIN   Dose:  10 mg   Quantity:  90 tablet        Take 1 tablet (10 mg) by mouth daily INDICATION: TO CONTROL ALLERGY SYMPTOMS   Refills:  3        metoprolol 25 MG 24 hr tablet   Commonly known as:  TOPROL-XL   Dose:  12.5 mg   Quantity:  90 tablet        Take 0.5 tablets (12.5 mg) by mouth 2 times daily INDICATION:TO LOWER BLOOD PRESSURE AND TO HELP PREVENT HEART DISEASE   Refills:  3        nitroGLYcerin 0.4 MG sublingual tablet   Commonly known as:  NITROSTAT   Dose:  0.4 mg   Quantity:  15 tablet        Place 1 tablet (0.4 mg) under the tongue See Admin Instructions for chest pain   Refills:  7        OMEGA-3 FISH OIL PO    Dose:  2 capsule        Take 2 capsules by mouth 2 times daily   Refills:  0        omeprazole 20 MG CR capsule   Commonly known as:  priLOSEC   Quantity:  90 capsule        TAKE 1 CAPSULE (20 MG) BY MOUTH DAILY INDICATION: TO CONTROL REFLUX SYMPTOMS   Refills:  2        order for DME   Quantity:  1 Device        Equipment being ordered: BP MACHINE WITH PULSE MONITOR   Refills:  PRN        traMADol 50 MG tablet   Commonly known as:  ULTRAM   Dose:  50 mg   Quantity:  120 tablet        Take 1 tablet (50 mg) by mouth every 6 hours as needed for moderate pain   Refills:  3        TYLENOL PO   Dose:  500-1000 mg        Take 500-1,000 mg by mouth every 6 hours as needed for mild pain or fever   Refills:  0                Prescriptions were sent or printed at these locations (1 Prescription)                   Other Prescriptions                Printed at Department/Unit printer (1 of 1)         oxyCODONE (ROXICODONE) 5 MG IR tablet                Procedures and tests performed during your visit     Ankle XR, G/E 3 views, right    Knee XR, 1-2 views, left      Orders Needing Specimen Collection     None      Pending Results     No orders found from 9/8/2017 to 9/11/2017.            Pending Culture Results     No orders found from 9/8/2017 to 9/11/2017.            Pending Results Instructions     If you had any lab results that were not finalized at the time of your Discharge, you can call the ED Lab Result RN at [phone]. You will be contacted by this team for any positive Lab results or changes in treatment. The nurses are available 7 days a week from 10A to 6:30P.  You can leave a message 24 hours per day and they will return your call.        Test Results From Your Hospital Stay        9/10/2017  3:14 PM      Narrative     KNEE LEFT ONE TO TWO VIEWS September 10, 2017 2:43 PM     HISTORY: Left knee pain and abrasion after fall.    COMPARISON: None.    FINDINGS: Negative left knee. No fracture.        Impression      IMPRESSION: Negative.    SHARMAINE TOBIN MD         9/10/2017  3:14 PM      Narrative     ANKLE RIGHT THREE OR MORE VIEWS September 10, 2017 2:42 PM     HISTORY: Lateral ankle pain and swelling and fall.    COMPARISON: None.    FINDINGS: Mild soft tissue swelling over the lateral malleolus of the  right ankle. No fracture or acute abnormality.        Impression     IMPRESSION: Soft tissue swelling. No fracture.    SHARMAINE TOBIN MD                Clinical Quality Measure: Blood Pressure Screening     Your blood pressure was checked while you were in the emergency department today. The last reading we obtained was  BP: (!) 178/98 . Please read the guidelines below about what these numbers mean and what you should do about them.  If your systolic blood pressure (the top number) is less than 120 and your diastolic blood pressure (the bottom number) is less than 80, then your blood pressure is normal. There is nothing more that you need to do about it.  If your systolic blood pressure (the top number) is 120-139 or your diastolic blood pressure (the bottom number) is 80-89, your blood pressure may be higher than it should be. You should have your blood pressure rechecked within a year by a primary care provider.  If your systolic blood pressure (the top number) is 140 or greater or your diastolic blood pressure (the bottom number) is 90 or greater, you may have high blood pressure. High blood pressure is treatable, but if left untreated over time it can put you at risk for heart attack, stroke, or kidney failure. You should have your blood pressure rechecked by a primary care provider within the next 4 weeks.  If your provider in the emergency department today gave you specific instructions to follow-up with your doctor or provider even sooner than that, you should follow that instruction and not wait for up to 4 weeks for your follow-up visit.        Thank you for choosing [facility]       Thank you for choosing  [facility] for your care. Our goal is always to provide you with excellent care. Hearing back from our patients is one way we can continue to improve our services. Please take a few minutes to complete the written survey that you may receive in the mail after you visit with us. Thank you!        [employer] Information     [employer] gives you secure access to your electronic health record. If you see a primary care provider, you can also send messages to your care team and make appointments. If you have questions, please call your primary care clinic.  If you do not have a primary care provider, please call [phone] and they will assist you.        Care EveryWhere ID     This is your Care EveryWhere ID. This could be used by other organizations to access your [facility] medical records  [id number]        Equal Access to Services     JAEL CRUMP : Thai Vásquez, liliana kennedy, andre dominguez, alesia cerda. So [facility] [phone].    ATENCIÓN: Si habla español, tiene a mahmood disposición servicios gratuitos de asistencia lingüística. Llame al [phone].    We comply with applicable federal civil rights laws and [place] laws. We do not discriminate on the basis of race, color, national origin, age, disability sex, sexual orientation or gender identity.            After Visit Summary       This is your record. Keep this with you and show to your community pharmacist(s) and doctor(s) at your next visit.

## 2017-09-10 NOTE — LETTER
To Whom it may concern:      Shirley Hendricks was seen in our Emergency Department today, 09/10/17.  I expect her condition to improve over the next 3-7 days.  she may return to work when improved and cleared by her primary care provider or orthopedist.    Sincerely,  Ana Paula Sawant PA-C

## 2017-09-10 NOTE — DISCHARGE INSTRUCTIONS
Discharge Instructions  Ankle Sprain    An ankle sprain is a stretching or tearing of a ligament around your ankle joint. In most cases, we recommend resting the ankle for about 3 days, followed by return to activity. Some severe sprains need longer periods of rest, or can require a cast or boot to immobilize them.    Return to the Emergency Department if:    Your pain is much worse, or if there is pain in a new area.    Your foot or leg becomes pale, cool, blue, or numb or tingling.    There is anything concerning to you about how your ankle looks.    Any splint or device is feeling too tight, causing pain, or rubbing into your skin.    Follow-up with your doctor:    As recommended by your emergency physician.    If your ankle is not back to normal within about 1 week.    If you are involved in significant athletic activities.        Treatment:    Apply ice your injured area for 15 minutes at a time, at least 3 times a day for the first 1-2 days. Use a cloth between the ice bag and your skin to prevent frostbite.     Do not sleep with an ice pack or heating pad on, since this can cause burns or skin injury.    Raise the injured area above the level of your heart as much as possible in the first 1-2 days.    Pain medications -- You may take a pain medication such as Tylenol  (acetaminophen), Advil , Nuprin  (ibuprofen) or Aleve  (naproxen).  If you have been given a narcotic such as Vicodin  (hydrocodone with acetaminophen), Percocet  (oxycodone with acetaminophen), or codeine, do not drive for four hours after you have taken it. If the narcotic contains Tylenol  (acetaminophen), do not take Tylenol  with it. All narcotics will cause constipation, so eat a high fiber diet.      Splint. We often give a stirrup-shaped ankle splint to support your ankle and prevent it from turning again. Wear this all the time for the first 3-5 days, and then as directed by your doctor.    Crutches. If you can t put wait on the ankle  without a lot of pain, we recommend crutches. You can put as much weight on the ankle as possible without severe pain.     Compression. An elastic bandage (Ace  wrap) can help with pain and swelling. Remove this at least twice a day, and leave it off for several hours if you develop swelling of the foot.   If you were given a prescription for medicine here today, be sure to read all of the information (including the package insert) that comes with your prescription.  This will include important information about the medicine, its side effects, and any warnings that you need to know about.  The pharmacist who fills the prescription can provide more information and answer questions you may have about the medicine.  If you have questions or concerns that the pharmacist cannot address, please call or return to the Emergency Department.  Opioid Medication Information    Pain medications are among the most commonly prescribed medicines, so we are including this information for all our patients. If you did not receive pain medication or get a prescription for pain medicine, you can ignore it.     You may have been given a prescription for an opioid (narcotic) pain medicine and/or have received a pain medicine while here in the Emergency Department. These medicines can make you drowsy or impaired. You must not drive, operate dangerous equipment, or engage in any other dangerous activities while taking these medications. If you drive while taking these medications, you could be arrested for DUI, or driving under the influence. Do not drink any alcohol while you are taking these medications.     Opioid pain medications can cause addiction. If you have a history of chemical dependency of any type, you are at a higher risk of becoming addicted to pain medications.  Only take these prescribed medications to treat your pain when all other options have been tried. Take it for as short a time and as few doses as possible. Store  your pain pills in a secure place, as they are frequently stolen and provide a dangerous opportunity for children or visitors in your house to start abusing these powerful medications. We will not replace any lost or stolen medicine.  As soon as your pain is better, you should flush all your remaining medication.     Many prescription pain medications contain Tylenol  (acetaminophen), including Vicodin , Tylenol #3 , Norco , Lortab , and Percocet .  You should not take any extra pills of Tylenol  if you are using these prescription medications or you can get very sick.  Do not ever take more than 3000 mg of acetaminophen in any 24 hour period.    All opioids tend to cause constipation. Drink plenty of water and eat foods that have a lot of fiber, such as fruits, vegetables, prune juice, apple juice and high fiber cereal.  Take a laxative if you don t move your bowels at least every other day. Miralax , Milk of Magnesia, Colace , or Senna  can be used to keep you regular.      Remember that you can always come back to the Emergency Department if you are not able to see your regular doctor in the amount of time listed above, if you get any new symptoms, or if there is anything that worries you.      Abrasions  Abrasions are skin scrapes. Their treatment depends on how large and deep the abrasion is.  Home care  You may be prescribed an antibiotic cream or ointment to apply to the wound. This helps prevent infection. Follow instructions when using this medicine.  General care    To care for the abrasion, do the following each day for as long as directed by your healthcare provider.    If you were given a bandage, change it once a day. If your bandage sticks to the wound, soak it in warm water until it loosens.    Wash the area with soap and warm water. You may do this in a sink or under a tub faucet or shower. Rinse off the soap. Then pat the area dry with a clean towel.    If antibiotic ointment or cream was  prescribed, reapply it to the wound as directed. Cover the wound with a fresh nonstick bandage. If the bandage becomes wet or dirty, change it as soon as possible.    Some antibiotic ointments or cream can cause an allergic reaction or dermatitis. This may cause redness, itching and or hives. If this occurs, stop using the ointment immediately and wash off any remaining ointment. You may need to take some allergy medicine to relieve symptoms.    You may use acetaminophen or ibuprofen to control pain unless another pain medicine was prescribed. Talk with your healthcare provider before using these medicines if you have chronic liver or kidney disease or ever had a stomach ulcer or GI bleeding. Don t use ibuprofen in children younger than six months old.    Most skin wounds heal within 10 days. But an infection may occur even with treatment. So it s important to watch the wound for signs of infection as listed below.  Follow-up care  Follow up with your healthcare provider, or as advised.  When to seek medical advice  Call your healthcare provider right away if any of these occur:    Fever of 100.4 F (38 C) or higher, or as directed by your healthcare provider    Increasing pain, redness, swelling, or drainage from the wound    Bleeding from the wound that does not stop after a few minutes of steady, firm pressure    Decreased ability to move any body part near the wound  Date Last Reviewed: 3/3/2017    6387-0544 [employer]. [address] [place] [address], [place] [address]. All rights reserved. This information is not intended as a substitute for professional medical care. Always follow your healthcare professional's instructions.

## 2017-09-14 ENCOUNTER — OFFICE VISIT (OUTPATIENT)
Dept: SURGERY | Facility: CLINIC | Age: 57
End: 2017-09-14
Payer: COMMERCIAL

## 2017-09-14 VITALS
BODY MASS INDEX: 25.72 KG/M2 | HEART RATE: 56 BPM | HEIGHT: 60 IN | DIASTOLIC BLOOD PRESSURE: 71 MMHG | WEIGHT: 131 LBS | SYSTOLIC BLOOD PRESSURE: 120 MMHG

## 2017-09-14 DIAGNOSIS — K80.20 SYMPTOMATIC CHOLELITHIASIS: Primary | ICD-10-CM

## 2017-09-14 PROCEDURE — 99204 OFFICE O/P NEW MOD 45 MIN: CPT | Performed by: SURGERY

## 2017-09-14 NOTE — LETTER
Surgery Consultation, [facility]    September 14, 2017      Jacoby Tapia MD     Shirley Hendricks MRN# [id number]   YOB: 1960 Age: 56 year old      PCP:  Vasquez Benoit [phone]     Chief Complaint:  Right upper quadrant pain, nausea     History of Present Illness:  Shirley Hendricks is a 56 year old female who presented with several episodes of upper abdominal pain and intermittent nausea, usually after eating.  Commonly associated with eating greasy foods.  Also has pain with certain movements and positions.  Right upper quadrant ultrasound was earlier obtained and showed gallstones with wall thickening.  She does have a very extensive past medical history including an open aortobifemoral bypass.  She is on Plavix and aspirin.  The patient is now here to discuss diagnosis and management options.     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  PSH:  Shirley Hendricks  has a past surgical history that includes NONSPECIFIC PROCEDURE (12/00); NONSPECIFIC PROCEDURE (1987); NONSPECIFIC PROCEDURE (9/2/2009); NONSPECIFIC PROCEDURE (8/27/2009); cardiac catherization (9/3/2009); vascular surgery (aoto bi fem  left fem-AK pop); FABRIC WRAPPING OF ABDOMINAL ANEURYSM; GYN surgery (left tube); Angiogram; orthopedic surgery (left knee); and Endarterectomy carotid (Right, 5/11/2016).     Home medications and allergies reviewed.     Social History:  Shirley Hendricks  reports that she has been smoking Cigarettes.  She started smoking about 59 years ago. She has a  "10.00 pack-year smoking history. She has never used smokeless tobacco. She reports that she drinks alcohol. She reports that she does not use illicit drugs.  Family History:  Shirley Hendricks family history includes Blood Disease in her brother; CANCER in her mother; Cardiovascular in her father; DIABETES in her maternal grandmother; Lung Cancer in her maternal grandmother.     ROS:  The 10 point Review of Systems is negative other than noted in the HPI.  Occasional nausea, no emesis.  No fevers or chills..     Physical Exam:  Blood pressure 120/71, pulse 56, height 5' 0.25\" (1.53 m), weight 131 lb (59.4 kg), not currently breastfeeding.  131 lbs 0 oz  Thin female in no distress.  Patient has a pleasant affect, speaks without difficulty.   Pupils equal round and reactive to light.   No cervical lymphadenopathy or thyromegaly.   Lung fields clear, breathing comfortably.   Heart normal sinus rhythm.  No murmurs rubs or gallops.  Abdomen soft, nontender, nondistended.  Minimal tenderness in the right upper quadrant, no masses appreciated. No peritoneal signs or rebound.  Skin warm, dry, without rashes or lesions.     All new lab and imaging data was reviewed.  Abdominal ultrasound shows gallbladder with stones and wall thickening.        Assessment/plan:  Shirley Hendricks is a 56 year old female with signs and symptoms suggesting symptomatic cholelithiasis. I've recommended laparoscopic cholecystectomy. Surgical comorbidities include tobacco abuse, peripheral arterial disease, previous abdominal surgery.  I feel the patient is a good candidate for the surgery and that this should be able to be done as an outpatient.  I did explain to the patient that, given the timing of her symptoms, there is a chance that cholecystectomy may not completely relieve her pain.  They were going to consider their options and likely schedule surgery.     Sundeep Tapia M.D.  [facility]  [phone]    "

## 2017-09-14 NOTE — PROGRESS NOTES
Surgery Consultation, [facility] [place] Tapia MD    Shirley Hendricks MRN# [id number]   YOB: 1960 Age: 56 year old     PCP:  Vasquez Benoit [phone]    Chief Complaint:  Right upper quadrant pain, nausea    History of Present Illness:  Shirley Hendricks is a 56 year old female who presented with several episodes of upper abdominal pain and intermittent nausea, usually after eating.  Commonly associated with eating greasy foods.  Also has pain with certain movements and positions.  Right upper quadrant ultrasound was earlier obtained and showed gallstones with wall thickening.  She does have a very extensive past medical history including an open aortobifemoral bypass.  She is on Plavix and aspirin.  The patient is now here to discuss diagnosis and management options.    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  PSH:  Shirley Hendricks  has a past surgical history that includes NONSPECIFIC PROCEDURE (12/00); NONSPECIFIC PROCEDURE (1987); NONSPECIFIC PROCEDURE (9/2/2009); NONSPECIFIC PROCEDURE (8/27/2009); cardiac catherization (9/3/2009); vascular surgery (aoto bi fem  left fem-AK pop); FABRIC WRAPPING OF ABDOMINAL ANEURYSM; GYN surgery (left tube); Angiogram; orthopedic surgery (left knee); and Endarterectomy carotid (Right, 5/11/2016).    Home medications and allergies reviewed.    Social History:  Shirley Hendricks  reports that she has been smoking Cigarettes.  She started smoking about 59 years ago. She has a 10.00 pack-year smoking  "history. She has never used smokeless tobacco. She reports that she drinks alcohol. She reports that she does not use illicit drugs.  Family History:  Shirley Hendricks family history includes Blood Disease in her brother; CANCER in her mother; Cardiovascular in her father; DIABETES in her maternal grandmother; Lung Cancer in her maternal grandmother.    ROS:  The 10 point Review of Systems is negative other than noted in the HPI.  Occasional nausea, no emesis.  No fevers or chills..    Physical Exam:  Blood pressure 120/71, pulse 56, height 5' 0.25\" (1.53 m), weight 131 lb (59.4 kg), not currently breastfeeding.  131 lbs 0 oz  Thin female in no distress.  Patient has a pleasant affect, speaks without difficulty.   Pupils equal round and reactive to light.   No cervical lymphadenopathy or thyromegaly.   Lung fields clear, breathing comfortably.   Heart normal sinus rhythm.  No murmurs rubs or gallops.  Abdomen soft, nontender, nondistended.  Minimal tenderness in the right upper quadrant, no masses appreciated. No peritoneal signs or rebound.  Skin warm, dry, without rashes or lesions.    All new lab and imaging data was reviewed.  Abdominal ultrasound shows gallbladder with stones and wall thickening.       Assessment/plan:  Shirley Hendricks is a 56 year old female with signs and symptoms suggesting symptomatic cholelithiasis. I've recommended laparoscopic cholecystectomy. Surgical comorbidities include tobacco abuse, peripheral arterial disease, previous abdominal surgery.  I feel the patient is a good candidate for the surgery and that this should be able to be done as an outpatient.  I did explain to the patient that, given the timing of her symptoms, there is a chance that cholecystectomy may not completely relieve her pain.  They were going to consider their options and likely schedule surgery.    Sundeep Tapia M.D.  [facility]  [phone]    Please route or send letter to:  Primary Care " Provider (PCP) and Referring Provider

## 2017-09-14 NOTE — MR AVS SNAPSHOT
After Visit Summary   9/14/2017    Shirley Hendricks    MRN: [id number]           Patient Information     Date Of Birth          1960        Visit Information        Provider Department      9/14/2017 1:15 PM Jacoby Tapia MD [facility] General [facility]       Follow-ups after your visit        Your next 10 appointments already scheduled     Nov 07, 2017  9:30 AM CST   [facility] Visit with Vasquez Benoit MD   [facility]    [address] [id number] [phone]           Bring a current list of meds and any records pertaining to this visit. For Physicals, please bring immunization records and any forms needing to be filled out. Please arrive 10 minutes early to complete paperwork.            Feb 15, 2018  9:45 AM CST   US LOWER EXTREMITY ARTERIAL DUPLEX LEFT with SHVUS2   [facility] ([facility])    [address]  [facility] [address]   [phone]           Please bring a list of your medicines (including vitamins, minerals and over-the-counter drugs). Also, tell your doctor about any allergies you may have. Wear comfortable clothes and leave your valuables at home.  You do not need to do anything special to prepare for your exam.  Please call the Imaging Department at your exam site with any questions.            Feb 15, 2018 10:30 AM CST   US AORTA/IVC/ILIAC DUPLEX COMPLETE with SHVUS2   [facility] ([facility])    [address]  [facility] [address]   [phone]           Please bring a list of your medicines (including vitamins, minerals and over-the-counter drugs). Also, tell your doctor about any allergies you may have. Wear comfortable clothes and leave your valuables at home.  Adults: No eating or drinking  for 8 hours before the exam. You may take medicine with a small sip of water.  Children: - Children 6+ years: No food or drink for 6 hours before exam. - Children 1-5 years: No food or drink for 4 hours before exam. - Infants, breast-fed: may have breast milk up to 2 hours before exam. - Infants, formula: may have bottle until 4 hours before exam.  Please call the Imaging Department at your exam site with any questions.            Feb 15, 2018 11:15 AM CST   US XAVIER DOPPLER WITH EXERCISE with SHVUS2   [facility] ([facility])    [address]  [place] [address]   [phone]           Please bring a list of your medicines (including vitamins, minerals and over-the-counter drugs). Also, tell your doctor about any allergies you may have. Wear comfortable clothes and leave your valuables at home.  No caffeine or tobacco for 1 hour prior to exam.  Please call the Imaging Department at your exam site with any questions.            Feb 15, 2018 11:45 AM CST   Return Visit with Chadwick Lozano MD   [facility] ([facility])    [address]  [place] [phone]              Who to contact     If you have questions or need follow up information about today's clinic visit or your schedule please contact [facility] directly at [phone].  Normal or non-critical lab and imaging results will be communicated to you by MyChart, letter or phone within 4 business days after the clinic has received the results. If you do not hear from us within 7 days, please contact the clinic through [employer] or phone. If you have a critical or abnormal lab result, we will notify you by phone as soon as possible.  Submit refill requests through [employer] or call your pharmacy and they will forward the refill request to us. Please allow 3 business days for  "your refill to be completed.          Additional Information About Your Visit        MyChart Information     [employer] gives you secure access to your electronic health record. If you see a primary care provider, you can also send messages to your care team and make appointments. If you have questions, please call your primary care clinic.  If you do not have a primary care provider, please call [phone] and they will assist you.        Care EveryWhere ID     This is your Care EveryWhere ID. This could be used by other organizations to access your [facility] medical records  [id number]        Your Vitals Were     Pulse Height BMI (Body Mass Index)             56 5' 0.25\" (1.53 m) 25.37 kg/m2          Blood Pressure from Last 3 Encounters:   09/14/17 120/71   09/10/17 (!) 178/98   09/07/17 128/62    Weight from Last 3 Encounters:   09/14/17 131 lb (59.4 kg)   09/10/17 131 lb (59.4 kg)   09/07/17 131 lb 11.2 oz (59.7 kg)              Today, you had the following     No orders found for display       Primary Care Provider Office Phone # Fax #    Vasquez Benoit [phone]       [address] [facility] [address]        Equal Access to Services     JAEL CRUMP : Hadii aad ku hadasho Soomaali, waaxda luqadaha, qaybta kaalmada adeegyada, waxay idiin haydionen carla ferris labandar cerda. So [facility] [phone].    ATENCIÓN: Si habla español, tiene a mahmood disposición servicios gratuitos de asistencia lingüística. Llame al [phone].    We comply with applicable federal civil rights laws and [place] laws. We do not discriminate on the basis of race, color, national origin, age, disability sex, sexual orientation or gender identity.            Thank you!     Thank you for choosing [facility]  for your care. Our goal is always to provide you with excellent care. Hearing back from our patients is one way we can continue to improve our services. Please take a few minutes to complete the written " survey that you may receive in the mail after your visit with us. Thank you!             Your Updated Medication List - Protect others around you: Learn how to safely use, store and throw away your medicines at www.disposemymeds.org.          This list is accurate as of: 9/14/17  1:45 PM.  Always use your most recent med list.                   Brand Name Dispense Instructions for use Diagnosis    AIRBORNE Tbef     30 tablet    Take 1 tablet by mouth every morning INDICATION: VITAMIN C SUPPLEMENT    Scurvy, Low iron       albuterol 108 (90 BASE) MCG/ACT Inhaler    PROAIR HFA/PROVENTIL HFA/VENTOLIN HFA    1 Inhaler    Inhale 2 puffs into the lungs every 6 hours as needed for shortness of breath / dyspnea or wheezing    Chronic obstructive pulmonary disease, unspecified COPD type (H)       ASPIRIN EC PO      Take 81 mg by mouth daily        atorvastatin 80 MG tablet    LIPITOR    90 tablet    TAKE ONE TABLET (80MG) BY MOUTH EVERY EVENING INDICATION:TO LOWER CHOLESTEROL AND TO HELP REDUCE RISK OF REOCURRENCE OF HEAR ATTACK STROKE,A    PAD (peripheral artery disease) (H), Hyperlipidemia LDL goal <70       B COMPLEX 50 Tabs     100 tablet    Take 1 tablet by mouth every other day INDICATION: VITAMIN SUPPLEMENT    Neuropathy (H)       buPROPion 300 MG 24 hr tablet    WELLBUTRIN XL    90 tablet    Take 1 tablet (300 mg) by mouth every morning INDICATION: SMOKING CESSATION AND SITUATIONAL STRESS    Tobacco use disorder, Situational stress       Calcium Carbonate-Vitamin D3 600-400 MG-UNIT Tabs    CALCIUM 600-D    100 tablet    Take 1 tablet by mouth 2 times daily (with meals)    Vitamin D deficiency       cholecalciferol 2000 UNITS Caps     100 capsule    Take 2 capsules by mouth daily FOR VITAMIN D DEFICIENCY (LOW VITAMIN D)    Vitamin D deficiency       clopidogrel 75 MG tablet    PLAVIX    90 tablet    TAKE ONE TABLET BY MOUTH ONE TIME DAILY    Peripheral vascular disease, unspecified (H), History of TIA (transient  ischemic attack) and stroke       cyabnocobalamin 2500 MCG sublingual tablet    VITAMIN B-12    100 tablet    Take 2,500 mcg by mouth daily INDICATION: FOR VITAMIN B12 SUPPLEMENTATION - TO IMPROVE MEMORY, BALANCE, SLEEP AND MOOD, DIRECTIONS: PLEASE PLACE UNDER THE TONGUE TO DISSOLVE    Neuropathy (H)       denosumab 60 MG/ML Soln injection    PROLIA    1 Syringe    Inject 1 mL (60 mg) Subcutaneous every 6 months INDICATION: TO TREAT OSTEOPOROSIS    Osteoporosis       ezetimibe 10 MG tablet    ZETIA    90 tablet    TAKE 1 TABLET (10 MG) BY MOUTH DAILY INDICATION: TO LOWER CHOLESTEROL    PAD (peripheral artery disease) (H), Coronary artery disease involving native coronary artery of native heart without angina pectoris       fluticasone-vilanterol 200-25 MCG/INH oral inhaler    BREO ELLIPTA    1 Inhaler    Inhale 1 puff into the lungs daily INDICATION: COPD CONTROLLER    Chronic obstructive pulmonary disease, unspecified COPD type (H), Tobacco use disorder       ISOSORBIDE DINITRATE PO      Take 30 mg by mouth every morning Verified with Cub and patient med bottle as 30 mg Dinitrate daily.        lisinopril 40 MG tablet    PRINIVIL/ZESTRIL    90 tablet    Take 1 tablet (40 mg) by mouth daily INDICATION:TO LOWER BLOOD PRESSURE AND TO PRESERVE KIDNEY FUNCTION    PAD (peripheral artery disease) (H)       loratadine 10 MG tablet    CLARITIN    90 tablet    Take 1 tablet (10 mg) by mouth daily INDICATION: TO CONTROL ALLERGY SYMPTOMS    Chronic allergic rhinitis due to animal hair and dander       metoprolol 25 MG 24 hr tablet    TOPROL-XL    90 tablet    Take 0.5 tablets (12.5 mg) by mouth 2 times daily INDICATION:TO LOWER BLOOD PRESSURE AND TO HELP PREVENT HEART DISEASE    Essential hypertension, benign       nitroGLYcerin 0.4 MG sublingual tablet    NITROSTAT    15 tablet    Place 1 tablet (0.4 mg) under the tongue See Admin Instructions for chest pain    Personal history of MI (myocardial infarction)       OMEGA-3 FISH  OIL PO      Take 2 capsules by mouth 2 times daily        omeprazole 20 MG CR capsule    priLOSEC    90 capsule    TAKE 1 CAPSULE (20 MG) BY MOUTH DAILY INDICATION: TO CONTROL REFLUX SYMPTOMS    Reflux esophagitis       order for DME     1 Device    Equipment being ordered: BP MACHINE WITH PULSE MONITOR    HTN (hypertension)       oxyCODONE 5 MG IR tablet    ROXICODONE    15 tablet    Take 1-2 tablets (5-10 mg) by mouth every 6 hours as needed for pain        traMADol 50 MG tablet    ULTRAM    120 tablet    Take 1 tablet (50 mg) by mouth every 6 hours as needed for moderate pain    Primary osteoarthritis involving multiple joints       TYLENOL PO      Take 500-1,000 mg by mouth every 6 hours as needed for mild pain or fever

## 2017-09-22 NOTE — TELEPHONE ENCOUNTER
Prior authorization    Medication name breo  Insurance [employer]  Insurance ID number [id number]  Prior authorization faxed through cover my meds     Pain in right shoulder.

## 2017-09-25 ENCOUNTER — TELEPHONE (OUTPATIENT)
Dept: INTERNAL MEDICINE | Facility: CLINIC | Age: 57
End: 2017-09-25

## 2017-09-25 NOTE — TELEPHONE ENCOUNTER
I called  the patient and lm for her to call the clinic back and ask for me, at [phone]. ANA MARÍA Quinones

## 2017-09-25 NOTE — TELEPHONE ENCOUNTER
ANA ROSASH called  Pt is having surgery  Pt was seen by MD 9/7- PLEASE CLOSE CHART  MD WILL BE OUT OF OFFICE UNTIL 10/6

## 2017-09-25 NOTE — TELEPHONE ENCOUNTER
Dr. Narvaez out of the office this week.     Preop encounter ready to be closed on behalf of Dr. Narvaez.     All documentation was there, it cannot be closed because immunization pending.   Please clarify the immunization from that day and then close the chart    Please contact the patient and tell her NOT to take the Lisinopril on the morning of surgery.  She should take the Metoprolol and her usual inhalers for sure but can either take or hold the rest of her medications.

## 2017-09-26 NOTE — H&P (VIEW-ONLY)
SUBJECTIVE:   Shirley Hendricks is a 56 year old female who presents to clinic today for the following health issues:      Date of Exam: 9/7/2017    Date of Surgery: 9/27/17  [facility]  [facility]: [facility]   Primary Physician: Solange  Type of Anesthesia Anticipated:General    Shirley Hendricks is a 56 year old year old female (1960) who presents for pre-op evaluation undergoing lap cholecystectomy surgery for treatment of  cholecystitis.    Recent infectious illnesses?  NO  Complications with previous surgeries?  NO  Adverse reactions to anesthesia?  NO  Any known family history of anesthesia complications?  NO  Aspirin or NSAID use within the past 5 days:  NO      Concern - Patient here to discuss recent lab results and smoking cessation options       Abdominal Pain and swelling right upper abdomen      Duration: ongoing for over a year and worse over the last few months. Worse with meals, and also with bending over.  Recent CT scan of the abdomen revealed presence of stones within an enlarged gallbladder with suspicion of associated cholecystitis and gallbladder wall appearance  Consistent with gallbladder adenomyomatosis  .  Hypertension Follow-up      Outpatient blood pressures are not being checked.    Low Salt Diet: no added salt    COPD Follow-Up    Symptoms are currently: stable    Current fatigue or dyspnea with ambulation: none    Shortness of breath: stable    Increased or change in Cough/Sputum: No    Fever(s): No    Baseline ambulation without stopping to rest: 2 flights assessment. Able to walk up 1 flight of stairs without stopping to rest.    Any ER/UC or hospital admissions since your last visit? No     History   Smoking Status     Current Every Day Smoker     Packs/day: 0.25     Years: 40.00     Types: Cigarettes     Start date: 1960   Smokeless Tobacco     Never Used     Comment: 1/2 PPD     No results found for: FEV1, [id number]  Of note  "she has only been using her rescue inhaler. She states that she uses it about 3-4 times a week. She is interested in smoking cessation and is amenable to trying medication.      Chronic Pain Follow-Up    Shirley has generalized osteoarthrosis and low back pain of chronic duration and related to degenerative disease of the spine and primary osteoarthritis  Analgesia/pain control: good on current medication      Recent changes:  same      Overall control: Tolerable with discomfort  Activity level/function:      Daily activities:  Can do most things most days, with some rest    Work:  Pain does not limit any  work  Adverse effects:  No  Adherance    Taking medication as directed?  Yes    Participating in other treatments: not applicable  Risk Factors:    Sleep:  Good    Mood/anxiety:  slightly worsened - her  dealing with some issues with legal blindness of recent onset    Recent family or social stressors:  none noted and family illness as above    Other aggravating factors: prolonged sitting and sedentary lifestyle  PHQ-9 SCORE 3/3/2016 3/28/2017   Total Score 7 4     TYRON-7 SCORE 7/7/2016   Total Score 4     Encounter-Level CSA - 03/03/2016:          Controlled Substance Agreement - Scan on 3/3/2016  5:20 PM : CONTROLLED SUBSTANCE AGREEMENT- 3/3/2016 (below)                        Problem list and histories reviewed & adjusted, as indicated.  Additional history: as documented    Labs reviewed in EPIC    Reviewed and updated as needed this visit by clinical staffAllergies       Reviewed and updated as needed this visit by Provider         ROS:  14 point ROS negative except as above      OBJECTIVE:     /62  Pulse 80  Temp 98.1  F (36.7  C) (Oral)  Ht 5' 1\" (1.549 m)  Wt 131 lb 11.2 oz (59.7 kg)  SpO2 99%  BMI 24.88 kg/m2  Body mass index is 24.88 kg/(m^2).  GENERAL: healthy, alert and no distress  NECK: no adenopathy, no asymmetry, masses, or scars and thyroid normal to palpation  RESP: lungs clear to " auscultation - no rales, rhonchi or wheezes  CV: regular rate and rhythm, normal S1 S2, no S3 or S4, no murmur, click or rub, no peripheral edema and peripheral pulses strong  ABDOMEN: tenderness RUQ and bowel sounds normal  MS: no gross musculoskeletal defects noted, no edema    Diagnostic Test Results:  Results for orders placed or performed in visit on 09/01/17   US Abdomen Limited    Narrative    LIMITED ABDOMEN ULTRASOUND  9/1/2017 10:50 AM     HISTORY: Right upper quadrant pain.    FINDINGS:  Liver is normal in echogenicity without focal lesions. The  gallbladder contains gallstones and there is associated gallbladder  wall thickening concerning for cholecystitis, but multiple echogenic  foci are noted in the thickened wall with ringdown artifact likely  representing adenomyomatosis of the gallbladder. Common bile duct is  normal in diameter. Pancreas is normal where visualized. Examination  of the right kidney is unremarkable.      Impression    IMPRESSION:  Probable adenomyomatosis of the gallbladder with  cholelithiasis. Cholecystitis cannot be completely ruled out. No bile  duct dilatation is evident.    TANNA LAMBERT MD         DIAGNOSTICS:                                                        Recent Labs   Lab Test  08/14/17   1028  03/28/17   0922   07/07/16   1318  05/11/16   0840  05/10/16   1208   04/21/16   1400   03/03/16   2358   HGB  14.1   --    --   12.9  11.2*   --    < >  12.1   --    --    PLT  189   --    --   222   --    --    < >  198   --   155   INR   --    --    --    --    --   0.95   --   1.02   --    --    NA  139  137   < >   --    --    --    < >  135   < >   --    POTASSIUM  5.1  5.1   < >   --   4.1   --    < >  4.4   < >   --    CR  0.53  0.62   < >   --    --    --    < >  0.54   < >  0.56   A1C   --    --    --    --   5.4   --    --    --    --   5.6    < > = values in this interval not displayed.        ASSESSMENT/PLAN:     Hyperlipidemia; controlled   Plan:  No changes in  the patient's current treatment plan    Hypertension; controlled   Associated with the following complications:    Heart Disease   Plan:  No changes in the patient's current treatment plan    Depression; recurrent episode-- SITUATIONAL   Associated with the following complications:    None   Plan:  Medications:   Bupropion. - to help treat the symptoms of anxiety and depression and also for smoking cessation    COPD: Moderate = FeV1 < 79% -50%, controlled  Associated with the following complications:  None    Plan:  Medications: See orders, asthma controller reordered today and Shirley was encouraged to use it daily so as to decrease use of rescue inhaler.      COPD education: [email or web address]        IMPRESSION:                                                    Reason for surgery/procedure: symptomatic cholelithiasis    Diagnosis/reason for consult:     1. Calculus of gallbladder with acute on chronic cholecystitis without obstruction    2. Preop general physical exam    3. Adenomyomatosis of gallbladder    4. Primary osteoarthritis involving multiple joints    5. Chronic pain syndrome    6. Tobacco use disorder    7. Chronic obstructive pulmonary disease, unspecified COPD type (H)    8. PAD (peripheral artery disease) (H)    9. Hyperlipidemia LDL goal <70    10. Vitamin D deficiency    11. Neuropathy (H)    12. Scurvy    13. Low iron    14. Essential hypertension, benign    15. Coronary artery disease involving native coronary artery of native heart without angina pectoris    16. Situational stress    17. Need for prophylactic vaccination and inoculation against influenza    18. Special screening for malignant neoplasms, colon    19. Symptomatic cholelithiasis         The proposed surgical procedure is considered INTERMEDIATE risk.    REVISED CARDIAC RISK INDEX  The patient has the following serious cardiovascular risks for perioperative complications such as (MI, PE, VFib and 3  AV Block):  No serious cardiac  risks  INTERPRETATION: 0 risks: Class I (very low risk - 0.4% complication rate)    The patient has the following additional risks for perioperative complications:  No identified additional risks      ICD-10-CM    1. Calculus of gallbladder with acute on chronic cholecystitis without obstruction K80.12 GENERAL SURG ADULT REFERRAL   2. Preop general physical exam Z01.818    3. Adenomyomatosis of gallbladder D13.5 GENERAL SURG ADULT REFERRAL   4. Primary osteoarthritis involving multiple joints M15.0 traMADol (ULTRAM) 50 MG tablet   5. Chronic pain syndrome G89.4    6. Tobacco use disorder F17.200 fluticasone-vilanterol (BREO ELLIPTA) 200-25 MCG/INH oral inhaler     buPROPion (WELLBUTRIN XL) 300 MG 24 hr tablet     DISCONTINUED: buPROPion (WELLBUTRIN XL) 300 MG 24 hr tablet   7. Chronic obstructive pulmonary disease, unspecified COPD type (H) J44.9 fluticasone-vilanterol (BREO ELLIPTA) 200-25 MCG/INH oral inhaler     albuterol (PROAIR HFA/PROVENTIL HFA/VENTOLIN HFA) 108 (90 BASE) MCG/ACT Inhaler   8. PAD (peripheral artery disease) (H) I73.9 atorvastatin (LIPITOR) 80 MG tablet     lisinopril (PRINIVIL/ZESTRIL) 40 MG tablet     ezetimibe (ZETIA) 10 MG tablet   9. Hyperlipidemia LDL goal <70 E78.5 atorvastatin (LIPITOR) 80 MG tablet   10. Vitamin D deficiency E55.9 cholecalciferol 2000 UNITS CAPS     Calcium Carbonate-Vitamin D3 (CALCIUM 600-D) 600-400 MG-UNIT TABS   11. Neuropathy (H) G62.9 cyabnocobalamin (VITAMIN B-12) 2500 MCG sublingual tablet     B Complex Vitamins (B COMPLEX 50) TABS   12. Scurvy E54 Multiple Vitamins-Minerals (AIRBORNE) TBEF   13. Low iron E61.1 Multiple Vitamins-Minerals (AIRBORNE) TBEF   14. Essential hypertension, benign I10 metoprolol (TOPROL-XL) 25 MG 24 hr tablet   15. Coronary artery disease involving native coronary artery of native heart without angina pectoris I25.10 ezetimibe (ZETIA) 10 MG tablet   16. Situational stress F43.9 buPROPion (WELLBUTRIN XL) 300 MG 24 hr tablet      DISCONTINUED: buPROPion (WELLBUTRIN XL) 300 MG 24 hr tablet   17. Need for prophylactic vaccination and inoculation against influenza Z23 FLU VAC, SPLIT VIRUS IM > 3 YO (QUADRIVALENT) [89272]     Vaccine Administration, Initial [49666]     Vaccine Administration, Initial [40564]     FLU VAC, SPLIT VIRUS IM > 3 YO (QUADRIVALENT) [15558]   18. Special screening for malignant neoplasms, colon Z12.11 Fecal colorectal cancer screen FIT   19. Symptomatic cholelithiasis K80.20        RECOMMENDATIONS:                                                        Cardiovascular Risk  Patient is already on a Beta Blocker. Continue Betablocker therapy after surgery, using Beta blocker order set as necessary for NPO status.      Pulmonary Risk    History of COPD.   Lungs currently stable on current medications  Obsevre for post op bronchospasm, treat with albuteorl nebs as needed.           --Patient is to take all scheduled medications on the day of surgery EXCEPT for modifications listed below.    Anticoagulant or Antiplatelet Medication Use  No ASA or NSAIDs within 7 days of surgery         ACE Inhibitor or Angiotensin Receptor Blocker (ARB) Use  Ace inhibitor or Angiotensin Receptor Blocker (ARB) and should HOLD this medication for the 24 hours prior to surgery.          APPROVAL GIVEN to proceed with proposed procedure, without further diagnostic evaluation       Signed Electronically by: Eun Narvaez MD            Immunizations: Influenza vaccination administered today    DIAGNOSIS/PLAN:     ICD-10-CM    1. Calculus of gallbladder with acute on chronic cholecystitis without obstruction K80.12 GENERAL SURG ADULT REFERRAL   2. Adenomyomatosis of gallbladder D13.5 GENERAL SURG ADULT REFERRAL   3. Primary osteoarthritis involving multiple joints M15.0 traMADol (ULTRAM) 50 MG tablet   4. Chronic pain syndrome G89.4    5. Tobacco use disorder F17.200 fluticasone-vilanterol (BREO ELLIPTA) 200-25 MCG/INH oral inhaler      buPROPion (WELLBUTRIN XL) 300 MG 24 hr tablet     DISCONTINUED: buPROPion (WELLBUTRIN XL) 300 MG 24 hr tablet   6. Chronic obstructive pulmonary disease, unspecified COPD type (H) J44.9 fluticasone-vilanterol (BREO ELLIPTA) 200-25 MCG/INH oral inhaler     albuterol (PROAIR HFA/PROVENTIL HFA/VENTOLIN HFA) 108 (90 BASE) MCG/ACT Inhaler   7. PAD (peripheral artery disease) (H) I73.9 atorvastatin (LIPITOR) 80 MG tablet     lisinopril (PRINIVIL/ZESTRIL) 40 MG tablet     ezetimibe (ZETIA) 10 MG tablet   8. Hyperlipidemia LDL goal <70 E78.5 atorvastatin (LIPITOR) 80 MG tablet   9. Vitamin D deficiency E55.9 cholecalciferol 2000 UNITS CAPS     Calcium Carbonate-Vitamin D3 (CALCIUM 600-D) 600-400 MG-UNIT TABS   10. Neuropathy (H) G62.9 cyabnocobalamin (VITAMIN B-12) 2500 MCG sublingual tablet     B Complex Vitamins (B COMPLEX 50) TABS   11. Scurvy E54 Multiple Vitamins-Minerals (AIRBORNE) TBEF   12. Low iron E61.1 Multiple Vitamins-Minerals (AIRBORNE) TBEF   13. Essential hypertension, benign I10 metoprolol (TOPROL-XL) 25 MG 24 hr tablet   14. Coronary artery disease involving native coronary artery of native heart without angina pectoris I25.10 ezetimibe (ZETIA) 10 MG tablet   15. Situational stress F43.9 buPROPion (WELLBUTRIN XL) 300 MG 24 hr tablet     DISCONTINUED: buPROPion (WELLBUTRIN XL) 300 MG 24 hr tablet   16. Need for prophylactic vaccination and inoculation against influenza Z23 FLU VAC, SPLIT VIRUS IM > 3 YO (QUADRIVALENT) [21845]     Vaccine Administration, Initial [30020]       SIGNIFICANT ISSUES RE The primary encounter diagnosis was Calculus of gallbladder with acute on chronic cholecystitis without obstruction. Diagnoses of Adenomyomatosis of gallbladder, Primary osteoarthritis involving multiple joints, Chronic pain syndrome, Tobacco use disorder, Chronic obstructive pulmonary disease, unspecified COPD type (H), PAD (peripheral artery disease) (H), Hyperlipidemia LDL goal <70, Vitamin D deficiency,  Neuropathy (H), Scurvy, Low iron, Essential hypertension, benign, Coronary artery disease involving native coronary artery of native heart without angina pectoris, Situational stress, and Need for prophylactic vaccination and inoculation against influenza were also pertinent to this visit. AS NOTED AND ADDRESSED ABOVE   MEDS AND AND LABS AS ORDERED TO ADDRESS PREVIOUS AND CURRENT ABNORMAL INDICES    Shirley is actually pretty stable at this time. Her breathing is close to baseline but she is using her rescue inhaler every day and is not on a controller. Controller inhaler was reordered today and she is aware that she needs to pick it up.  She is very motivated with regard to smoking cessation at this time and is amenable to trying Wellbutrin as noted. She will return to the clinic in about 2-3 months for follow-up regarding this as she is also taking the Wellbutrin to treat situational stress.    She also has extensive vascular  Disease which is stable at this time. She will continue follow-up with Dr. Chadwick Lozano.    She had imaging done due to complaints of right upper quadrant swelling and pain. This was significant for gallbladder findings as noted. She will likely need cholecystectomy. A referral was placed for surgical consultation today.      She is also due for FIT test screening. She will  a screening kit from the lab today.    SEE PATIENT INSTRUCTION SECTION FOR FOLLOW UP PLAN    Shirley IS TO CONTINUE OTHER TREATMENT REGIMEN/PLANS EXCEPT AS INDICATED    COMPLIANCE WITH MEDICATIONS DIET AND EXERCISE PLANS ENCOURAGED    DISCONTINUED MEDS:  Medications Discontinued During This Encounter   Medication Reason     traMADol (ULTRAM) 50 MG tablet Reorder     fluticasone-vilanterol (BREO ELLIPTA) 200-25 MCG/INH oral inhaler Reorder     atorvastatin (LIPITOR) 80 MG tablet Reorder     cholecalciferol 2000 UNITS CAPS Reorder     cyabnocobalamin (VITAMIN B-12) 2500 MCG sublingual tablet Reorder     Multiple  Vitamins-Minerals (AIRBORNE) TBEF Reorder     albuterol (PROAIR HFA/PROVENTIL HFA/VENTOLIN HFA) 108 (90 BASE) MCG/ACT Inhaler Reorder     metoprolol (TOPROL-XL) 25 MG 24 hr tablet Reorder     B Complex Vitamins (B COMPLEX 50) TABS Reorder     lisinopril (PRINIVIL/ZESTRIL) 40 MG tablet Reorder     ezetimibe (ZETIA) 10 MG tablet Reorder     CALCIUM 600-D 600-400 MG-UNIT TABS Reorder     buPROPion (WELLBUTRIN XL) 300 MG 24 hr tablet Reorder       CURRENT MED LIST WITH CHANGES AS NOTED BELOW:  Current Outpatient Prescriptions   Medication Sig Dispense Refill     traMADol (ULTRAM) 50 MG tablet Take 1 tablet (50 mg) by mouth every 6 hours as needed for moderate pain 120 tablet 3     fluticasone-vilanterol (BREO ELLIPTA) 200-25 MCG/INH oral inhaler Inhale 1 puff into the lungs daily INDICATION: COPD CONTROLLER 1 Inhaler 11     atorvastatin (LIPITOR) 80 MG tablet TAKE ONE TABLET (80MG) BY MOUTH EVERY EVENING INDICATION:TO LOWER CHOLESTEROL AND TO HELP REDUCE RISK OF REOCURRENCE OF HEAR ATTACK STROKE,A 90 tablet 2     cholecalciferol 2000 UNITS CAPS Take 2 capsules by mouth daily FOR VITAMIN D DEFICIENCY (LOW VITAMIN D) 100 capsule PRN     cyabnocobalamin (VITAMIN B-12) 2500 MCG sublingual tablet Take 2,500 mcg by mouth daily INDICATION: FOR VITAMIN B12 SUPPLEMENTATION - TO IMPROVE MEMORY, BALANCE, SLEEP AND MOOD, DIRECTIONS: PLEASE PLACE UNDER THE TONGUE TO DISSOLVE 100 tablet 3     Multiple Vitamins-Minerals (AIRBORNE) TBEF Take 1 tablet by mouth every morning INDICATION: VITAMIN C SUPPLEMENT 30 tablet 11     albuterol (PROAIR HFA/PROVENTIL HFA/VENTOLIN HFA) 108 (90 BASE) MCG/ACT Inhaler Inhale 2 puffs into the lungs every 6 hours as needed for shortness of breath / dyspnea or wheezing 1 Inhaler prn     metoprolol (TOPROL-XL) 25 MG 24 hr tablet Take 0.5 tablets (12.5 mg) by mouth 2 times daily INDICATION:TO LOWER BLOOD PRESSURE AND TO HELP PREVENT HEART DISEASE 90 tablet 3     B Complex Vitamins (B COMPLEX 50) TABS Take 1  tablet by mouth every other day INDICATION: VITAMIN SUPPLEMENT 100 tablet PRN     lisinopril (PRINIVIL/ZESTRIL) 40 MG tablet Take 1 tablet (40 mg) by mouth daily INDICATION:TO LOWER BLOOD PRESSURE AND TO PRESERVE KIDNEY FUNCTION 90 tablet 2     ezetimibe (ZETIA) 10 MG tablet TAKE 1 TABLET (10 MG) BY MOUTH DAILY INDICATION: TO LOWER CHOLESTEROL 90 tablet PRN     Calcium Carbonate-Vitamin D3 (CALCIUM 600-D) 600-400 MG-UNIT TABS Take 1 tablet by mouth 2 times daily (with meals) 100 tablet PRN     buPROPion (WELLBUTRIN XL) 300 MG 24 hr tablet Take 1 tablet (300 mg) by mouth every morning INDICATION: SMOKING CESSATION AND SITUATIONAL STRESS 90 tablet 3     nitroGLYcerin (NITROSTAT) 0.4 MG sublingual tablet Place 1 tablet (0.4 mg) under the tongue See Admin Instructions for chest pain 15 tablet 7     loratadine (CLARITIN) 10 MG tablet Take 1 tablet (10 mg) by mouth daily INDICATION: TO CONTROL ALLERGY SYMPTOMS 90 tablet 3     clopidogrel (PLAVIX) 75 MG tablet TAKE ONE TABLET BY MOUTH ONE TIME DAILY  90 tablet 0     omeprazole (PRILOSEC) 20 MG CR capsule TAKE 1 CAPSULE (20 MG) BY MOUTH DAILY INDICATION: TO CONTROL REFLUX SYMPTOMS 90 capsule 2     Acetaminophen (TYLENOL PO) Take 500-1,000 mg by mouth every 6 hours as needed for mild pain or fever       Omega-3 Fatty Acids (OMEGA-3 FISH OIL PO) Take 2 capsules by mouth 2 times daily       ASPIRIN EC PO Take 81 mg by mouth daily       ISOSORBIDE DINITRATE PO Take 30 mg by mouth every morning Verified with Cub and patient med bottle as 30 mg Dinitrate daily.       denosumab (PROLIA) 60 MG/ML SOLN Inject 1 mL (60 mg) Subcutaneous every 6 months INDICATION: TO TREAT OSTEOPOROSIS 1 Syringe PRN     ORDER FOR DME Equipment being ordered: BP MACHINE WITH PULSE MONITOR 1 Device PRN     denosumab (PROLIA) 60 MG/ML SOLN injection Inject 1 mL (60 mg) Subcutaneous once for 1 dose 1 mL 0     [DISCONTINUED] buPROPion (WELLBUTRIN XL) 300 MG 24 hr tablet Take 1 tablet (300 mg) by mouth every  morning INDICATION: SMOKING CESSATION 90 tablet 3     [DISCONTINUED] atorvastatin (LIPITOR) 80 MG tablet TAKE ONE TABLET (80MG) BY MOUTH EVERY EVENING INDICATION:TO LOWER CHOLESTEROL AND TO HELP REDUCE RISK OF REOCURRENCE OF HEAR ATTACK STROKE,A 90 tablet 2     [DISCONTINUED] albuterol (PROAIR HFA/PROVENTIL HFA/VENTOLIN HFA) 108 (90 BASE) MCG/ACT Inhaler Inhale 2 puffs into the lungs every 6 hours as needed for shortness of breath / dyspnea or wheezing 1 Inhaler prn     [DISCONTINUED] ezetimibe (ZETIA) 10 MG tablet TAKE 1 TABLET (10 MG) BY MOUTH DAILY INDICATION: TO LOWER CHOLESTEROL 90 tablet PRN     [DISCONTINUED] lisinopril (PRINIVIL/ZESTRIL) 40 MG tablet Take 1 tablet (40 mg) by mouth daily INDICATION:TO LOWER BLOOD PRESSURE AND TO PRESERVE KIDNEY FUNCTION 90 tablet 2     [DISCONTINUED] metoprolol (TOPROL-XL) 25 MG 24 hr tablet Take 0.5 tablets (12.5 mg) by mouth 2 times daily INDICATION:TO LOWER BLOOD PRESSURE AND TO HELP PREVENT HEART DISEASE 90 tablet 3         Office visit time > 40 mins, greater than 50% of which was spent obtaining history, reviewing medications, counseling re compliance with treatment plan, discussion of treatment, follow up plans, and coordination of care.       Patient Instructions   ** FOLLOW UP PLAN**:    PLEASE SCHEDULE OFFICE VISIT WITH DR. ISIS PANDA 2 MONTHS FROM TODAY TO FOLLOW UP ON  SITUATIONAL ANXIETY, SMOKING CESSATION AND ALSO TO ESTABLISH CARE      YOU HAVE BEEN REFERRED FOR SURGICAL CONSULTATION TO ADDRESS ISSUES RAISED TODAY REGARDING GALLBLADDER DISEASE    PLEASE  MAKE SURE TO SCHEDULE YOUR APPOINTMENT(S) BY TELEPHONE      YOU MAY CONTACT THE CLINIC IF ANY QUESTIONS OR CONCERNS -[phone] OR VIA JOSEPH Narvaez MD  [facility]  Injectable Influenza Immunization Documentation    1.  Are you sick today? (Fever of 100.5 or higher on the day of the clinic)   No    2.  Have you ever had Guillain-Marion Syndrome within 6  weeks of an influenza vaccionation?  No    3. Do you have a life-threatening allergy to eggs?  No    4. Do you have a life-threatening allergy to a component of the vaccine? May include antibiotics, gelatin or latex.  No     5. Have you ever had a reaction to a dose of flu vaccine that needed immediate medical attention?  No     Form completed by Valencia Neumann MA         Copy of this evaluation report is provided to requesting physician.    [facility] Preop Guidelines

## 2017-09-27 ENCOUNTER — ANESTHESIA (OUTPATIENT)
Dept: SURGERY | Facility: CLINIC | Age: 57
End: 2017-09-27
Payer: COMMERCIAL

## 2017-09-27 ENCOUNTER — OFFICE VISIT (OUTPATIENT)
Dept: SURGERY | Facility: PHYSICIAN GROUP | Age: 57
End: 2017-09-27
Payer: COMMERCIAL

## 2017-09-27 ENCOUNTER — SURGERY (OUTPATIENT)
Age: 57
End: 2017-09-27

## 2017-09-27 ENCOUNTER — ANESTHESIA EVENT (OUTPATIENT)
Dept: SURGERY | Facility: CLINIC | Age: 57
End: 2017-09-27
Payer: COMMERCIAL

## 2017-09-27 ENCOUNTER — HOSPITAL ENCOUNTER (OUTPATIENT)
Facility: CLINIC | Age: 57
Discharge: HOME | End: 2017-09-27
Attending: SURGERY | Admitting: SURGERY
Payer: COMMERCIAL

## 2017-09-27 VITALS
BODY MASS INDEX: 24 KG/M2 | OXYGEN SATURATION: 95 % | SYSTOLIC BLOOD PRESSURE: 139 MMHG | HEIGHT: 62 IN | RESPIRATION RATE: 18 BRPM | DIASTOLIC BLOOD PRESSURE: 72 MMHG | WEIGHT: 130.4 LBS | TEMPERATURE: 97 F

## 2017-09-27 DIAGNOSIS — K80.20 SYMPTOMATIC CHOLELITHIASIS: Primary | ICD-10-CM

## 2017-09-27 PROCEDURE — 25000125 ZZHC RX 250: Performed by: NURSE ANESTHETIST, CERTIFIED REGISTERED

## 2017-09-27 PROCEDURE — 71000012 ZZH RECOVERY PHASE 1 LEVEL 1 FIRST HR: Performed by: SURGERY

## 2017-09-27 PROCEDURE — 25800025 ZZH RX 258: Performed by: SURGERY

## 2017-09-27 PROCEDURE — 71000027 ZZH RECOVERY PHASE 2 EACH 15 MINS: Performed by: SURGERY

## 2017-09-27 PROCEDURE — S0077 INJECTION, CLINDAMYCIN PHOSP: HCPCS | Performed by: SURGERY

## 2017-09-27 PROCEDURE — 25000125 ZZHC RX 250: Performed by: SURGERY

## 2017-09-27 PROCEDURE — 27210995 ZZH RX 272: Performed by: SURGERY

## 2017-09-27 PROCEDURE — 47562 LAPAROSCOPIC CHOLECYSTECTOMY: CPT | Mod: AS | Performed by: PHYSICIAN ASSISTANT

## 2017-09-27 PROCEDURE — 25000128 H RX IP 250 OP 636: Performed by: NURSE ANESTHETIST, CERTIFIED REGISTERED

## 2017-09-27 PROCEDURE — 37000009 ZZH ANESTHESIA TECHNICAL FEE, EACH ADDTL 15 MIN: Performed by: SURGERY

## 2017-09-27 PROCEDURE — 88304 TISSUE EXAM BY PATHOLOGIST: CPT | Mod: 26 | Performed by: SURGERY

## 2017-09-27 PROCEDURE — 36000056 ZZH SURGERY LEVEL 3 1ST 30 MIN: Performed by: SURGERY

## 2017-09-27 PROCEDURE — 88304 TISSUE EXAM BY PATHOLOGIST: CPT | Performed by: SURGERY

## 2017-09-27 PROCEDURE — 71000013 ZZH RECOVERY PHASE 1 LEVEL 1 EA ADDTL HR: Performed by: SURGERY

## 2017-09-27 PROCEDURE — 40000170 ZZH STATISTIC PRE-PROCEDURE ASSESSMENT II: Performed by: SURGERY

## 2017-09-27 PROCEDURE — 36000058 ZZH SURGERY LEVEL 3 EA 15 ADDTL MIN: Performed by: SURGERY

## 2017-09-27 PROCEDURE — 25000132 ZZH RX MED GY IP 250 OP 250 PS 637: Performed by: PHYSICIAN ASSISTANT

## 2017-09-27 PROCEDURE — 47562 LAPAROSCOPIC CHOLECYSTECTOMY: CPT | Performed by: SURGERY

## 2017-09-27 PROCEDURE — 25000566 ZZH SEVOFLURANE, EA 15 MIN: Performed by: SURGERY

## 2017-09-27 PROCEDURE — 27210794 ZZH OR GENERAL SUPPLY STERILE: Performed by: SURGERY

## 2017-09-27 PROCEDURE — 25000128 H RX IP 250 OP 636: Performed by: ANESTHESIOLOGY

## 2017-09-27 PROCEDURE — 37000008 ZZH ANESTHESIA TECHNICAL FEE, 1ST 30 MIN: Performed by: SURGERY

## 2017-09-27 RX ORDER — MEPERIDINE HYDROCHLORIDE 25 MG/ML
12.5 INJECTION INTRAMUSCULAR; INTRAVENOUS; SUBCUTANEOUS
Status: DISCONTINUED | OUTPATIENT
Start: 2017-09-27 | End: 2017-09-27 | Stop reason: HOSPADM

## 2017-09-27 RX ORDER — GLYCOPYRROLATE 0.2 MG/ML
INJECTION, SOLUTION INTRAMUSCULAR; INTRAVENOUS PRN
Status: DISCONTINUED | OUTPATIENT
Start: 2017-09-27 | End: 2017-09-27

## 2017-09-27 RX ORDER — OXYCODONE HYDROCHLORIDE 5 MG/1
5-10 TABLET ORAL
Status: COMPLETED | OUTPATIENT
Start: 2017-09-27 | End: 2017-09-27

## 2017-09-27 RX ORDER — SODIUM CHLORIDE, SODIUM LACTATE, POTASSIUM CHLORIDE, CALCIUM CHLORIDE 600; 310; 30; 20 MG/100ML; MG/100ML; MG/100ML; MG/100ML
INJECTION, SOLUTION INTRAVENOUS CONTINUOUS PRN
Status: DISCONTINUED | OUTPATIENT
Start: 2017-09-27 | End: 2017-09-27

## 2017-09-27 RX ORDER — FENTANYL CITRATE 50 UG/ML
25-50 INJECTION, SOLUTION INTRAMUSCULAR; INTRAVENOUS EVERY 5 MIN PRN
Status: DISCONTINUED | OUTPATIENT
Start: 2017-09-27 | End: 2017-09-27 | Stop reason: HOSPADM

## 2017-09-27 RX ORDER — NEOSTIGMINE METHYLSULFATE 1 MG/ML
VIAL (ML) INJECTION PRN
Status: DISCONTINUED | OUTPATIENT
Start: 2017-09-27 | End: 2017-09-27

## 2017-09-27 RX ORDER — FENTANYL CITRATE 50 UG/ML
25-50 INJECTION, SOLUTION INTRAMUSCULAR; INTRAVENOUS
Status: DISCONTINUED | OUTPATIENT
Start: 2017-09-27 | End: 2017-09-27 | Stop reason: HOSPADM

## 2017-09-27 RX ORDER — FENTANYL CITRATE 50 UG/ML
INJECTION, SOLUTION INTRAMUSCULAR; INTRAVENOUS PRN
Status: DISCONTINUED | OUTPATIENT
Start: 2017-09-27 | End: 2017-09-27

## 2017-09-27 RX ORDER — PROPOFOL 10 MG/ML
INJECTION, EMULSION INTRAVENOUS CONTINUOUS PRN
Status: DISCONTINUED | OUTPATIENT
Start: 2017-09-27 | End: 2017-09-27

## 2017-09-27 RX ORDER — NALOXONE HYDROCHLORIDE 0.4 MG/ML
.1-.4 INJECTION, SOLUTION INTRAMUSCULAR; INTRAVENOUS; SUBCUTANEOUS
Status: DISCONTINUED | OUTPATIENT
Start: 2017-09-27 | End: 2017-09-27 | Stop reason: HOSPADM

## 2017-09-27 RX ORDER — MAGNESIUM HYDROXIDE 1200 MG/15ML
LIQUID ORAL PRN
Status: DISCONTINUED | OUTPATIENT
Start: 2017-09-27 | End: 2017-09-27 | Stop reason: HOSPADM

## 2017-09-27 RX ORDER — BUPIVACAINE HYDROCHLORIDE AND EPINEPHRINE 2.5; 5 MG/ML; UG/ML
INJECTION, SOLUTION EPIDURAL; INFILTRATION; INTRACAUDAL; PERINEURAL
Status: DISCONTINUED
Start: 2017-09-27 | End: 2017-09-27 | Stop reason: HOSPADM

## 2017-09-27 RX ORDER — ONDANSETRON 4 MG/1
4 TABLET, ORALLY DISINTEGRATING ORAL EVERY 30 MIN PRN
Status: DISCONTINUED | OUTPATIENT
Start: 2017-09-27 | End: 2017-09-27 | Stop reason: HOSPADM

## 2017-09-27 RX ORDER — EPHEDRINE SULFATE 50 MG/ML
INJECTION, SOLUTION INTRAMUSCULAR; INTRAVENOUS; SUBCUTANEOUS PRN
Status: DISCONTINUED | OUTPATIENT
Start: 2017-09-27 | End: 2017-09-27

## 2017-09-27 RX ORDER — OXYCODONE HYDROCHLORIDE 5 MG/1
5-10 TABLET ORAL
Qty: 30 TABLET | Refills: 0 | Status: SHIPPED | OUTPATIENT
Start: 2017-09-27 | End: 2017-11-07

## 2017-09-27 RX ORDER — PROPOFOL 10 MG/ML
INJECTION, EMULSION INTRAVENOUS PRN
Status: DISCONTINUED | OUTPATIENT
Start: 2017-09-27 | End: 2017-09-27

## 2017-09-27 RX ORDER — ACETAMINOPHEN 325 MG/1
650 TABLET ORAL
Status: DISCONTINUED | OUTPATIENT
Start: 2017-09-27 | End: 2017-09-27 | Stop reason: HOSPADM

## 2017-09-27 RX ORDER — SODIUM CHLORIDE, SODIUM LACTATE, POTASSIUM CHLORIDE, CALCIUM CHLORIDE 600; 310; 30; 20 MG/100ML; MG/100ML; MG/100ML; MG/100ML
INJECTION, SOLUTION INTRAVENOUS CONTINUOUS
Status: DISCONTINUED | OUTPATIENT
Start: 2017-09-27 | End: 2017-09-27 | Stop reason: HOSPADM

## 2017-09-27 RX ORDER — CLINDAMYCIN PHOSPHATE 900 MG/50ML
900 INJECTION, SOLUTION INTRAVENOUS
Status: COMPLETED | OUTPATIENT
Start: 2017-09-27 | End: 2017-09-27

## 2017-09-27 RX ORDER — ONDANSETRON 2 MG/ML
INJECTION INTRAMUSCULAR; INTRAVENOUS PRN
Status: DISCONTINUED | OUTPATIENT
Start: 2017-09-27 | End: 2017-09-27

## 2017-09-27 RX ORDER — CLINDAMYCIN PHOSPHATE 900 MG/50ML
900 INJECTION, SOLUTION INTRAVENOUS SEE ADMIN INSTRUCTIONS
Status: DISCONTINUED | OUTPATIENT
Start: 2017-09-27 | End: 2017-09-27 | Stop reason: HOSPADM

## 2017-09-27 RX ORDER — BUPIVACAINE HYDROCHLORIDE AND EPINEPHRINE 2.5; 5 MG/ML; UG/ML
INJECTION, SOLUTION INFILTRATION; PERINEURAL PRN
Status: DISCONTINUED | OUTPATIENT
Start: 2017-09-27 | End: 2017-09-27 | Stop reason: HOSPADM

## 2017-09-27 RX ORDER — LIDOCAINE HYDROCHLORIDE 20 MG/ML
INJECTION, SOLUTION INFILTRATION; PERINEURAL PRN
Status: DISCONTINUED | OUTPATIENT
Start: 2017-09-27 | End: 2017-09-27

## 2017-09-27 RX ORDER — ONDANSETRON 2 MG/ML
4 INJECTION INTRAMUSCULAR; INTRAVENOUS EVERY 30 MIN PRN
Status: DISCONTINUED | OUTPATIENT
Start: 2017-09-27 | End: 2017-09-27 | Stop reason: HOSPADM

## 2017-09-27 RX ORDER — HYDROMORPHONE HYDROCHLORIDE 1 MG/ML
.3-.5 INJECTION, SOLUTION INTRAMUSCULAR; INTRAVENOUS; SUBCUTANEOUS EVERY 10 MIN PRN
Status: DISCONTINUED | OUTPATIENT
Start: 2017-09-27 | End: 2017-09-27 | Stop reason: HOSPADM

## 2017-09-27 RX ADMIN — OXYCODONE HYDROCHLORIDE 5 MG: 5 TABLET ORAL at 11:01

## 2017-09-27 RX ADMIN — Medication 5 MG: at 08:30

## 2017-09-27 RX ADMIN — ONDANSETRON 4 MG: 2 INJECTION INTRAMUSCULAR; INTRAVENOUS at 09:09

## 2017-09-27 RX ADMIN — NEOSTIGMINE METHYLSULFATE 2 MG: 1 INJECTION INTRAMUSCULAR; INTRAVENOUS; SUBCUTANEOUS at 09:14

## 2017-09-27 RX ADMIN — SODIUM CHLORIDE, POTASSIUM CHLORIDE, SODIUM LACTATE AND CALCIUM CHLORIDE: 600; 310; 30; 20 INJECTION, SOLUTION INTRAVENOUS at 08:18

## 2017-09-27 RX ADMIN — HYDROMORPHONE HYDROCHLORIDE 0.5 MG: 1 INJECTION, SOLUTION INTRAMUSCULAR; INTRAVENOUS; SUBCUTANEOUS at 10:30

## 2017-09-27 RX ADMIN — PROPOFOL 150 MG: 10 INJECTION, EMULSION INTRAVENOUS at 08:20

## 2017-09-27 RX ADMIN — Medication 5 MG: at 08:36

## 2017-09-27 RX ADMIN — FENTANYL CITRATE 50 MCG: 50 INJECTION, SOLUTION INTRAMUSCULAR; INTRAVENOUS at 08:20

## 2017-09-27 RX ADMIN — CLINDAMYCIN PHOSPHATE 900 MG: 18 INJECTION, SOLUTION INTRAVENOUS at 08:22

## 2017-09-27 RX ADMIN — PROPOFOL 50 MG: 10 INJECTION, EMULSION INTRAVENOUS at 08:48

## 2017-09-27 RX ADMIN — BUPIVACAINE HYDROCHLORIDE AND EPINEPHRINE BITARTRATE 30 ML: 2.5; .005 INJECTION, SOLUTION EPIDURAL; INFILTRATION; INTRACAUDAL; PERINEURAL at 09:07

## 2017-09-27 RX ADMIN — ROCURONIUM BROMIDE 30 MG: 10 INJECTION INTRAVENOUS at 08:20

## 2017-09-27 RX ADMIN — PROPOFOL 50 MG: 10 INJECTION, EMULSION INTRAVENOUS at 08:51

## 2017-09-27 RX ADMIN — FENTANYL CITRATE 50 MCG: 50 INJECTION, SOLUTION INTRAMUSCULAR; INTRAVENOUS at 09:53

## 2017-09-27 RX ADMIN — FENTANYL CITRATE 50 MCG: 50 INJECTION, SOLUTION INTRAMUSCULAR; INTRAVENOUS at 08:48

## 2017-09-27 RX ADMIN — MIDAZOLAM HYDROCHLORIDE 2 MG: 1 INJECTION, SOLUTION INTRAMUSCULAR; INTRAVENOUS at 08:20

## 2017-09-27 RX ADMIN — FENTANYL CITRATE 50 MCG: 50 INJECTION, SOLUTION INTRAMUSCULAR; INTRAVENOUS at 08:45

## 2017-09-27 RX ADMIN — LIDOCAINE HYDROCHLORIDE 80 MG: 20 INJECTION, SOLUTION INFILTRATION; PERINEURAL at 08:20

## 2017-09-27 RX ADMIN — GLYCOPYRROLATE 0.4 MG: 0.2 INJECTION, SOLUTION INTRAMUSCULAR; INTRAVENOUS at 09:14

## 2017-09-27 RX ADMIN — FENTANYL CITRATE 50 MCG: 50 INJECTION, SOLUTION INTRAMUSCULAR; INTRAVENOUS at 09:59

## 2017-09-27 RX ADMIN — PROPOFOL 140 MCG/KG/MIN: 10 INJECTION, EMULSION INTRAVENOUS at 08:20

## 2017-09-27 RX ADMIN — SODIUM CHLORIDE 1000 ML: 0.9 IRRIGANT IRRIGATION at 09:08

## 2017-09-27 RX ADMIN — SODIUM CHLORIDE 50 ML: 900 IRRIGANT IRRIGATION at 09:07

## 2017-09-27 ASSESSMENT — LIFESTYLE VARIABLES: TOBACCO_USE: 1

## 2017-09-27 ASSESSMENT — COPD QUESTIONNAIRES: COPD: 1

## 2017-09-27 NOTE — OP NOTE
General Surgery Operative Note    PREOPERATIVE DIAGNOSIS:  GALLSTONES    POSTOPERATIVE DIAGNOSIS:  Cholelithiasis    PROCEDURE:   Procedure(s):  LAPAROSCOPIC CHOLECYSTECTOMY    ANESTHESIA:  General.    PREOPERATIVE MEDICATIONS:  Cleocin IV.    SURGEON:  Jacoby Tapia MD    ASSISTANT:  Kera Pugh PA-C  A first assistant was necessary owing to challenging laparoscopic visualization and exposure.  Retraction was also necessary.    INDICATIONS:  Pt with history of RUQ abdominal pain and gallstones.    PROCEDURE:  The patient was taken to the operating suite and uneventfully endotracheally intubated.  The abdomen was prepped and draped in a sterile fashion.  Surgeon initiated timeout was acknowledged.  We entered the abdomen in the left upper quadrant using Visiport technique.  Two other trocars were placed under laparoscopic visualization.  We elevated the liver and were able to identify a somewhat inflamed gallbladder.  The gallbladder was grasped and used to elevate the liver further.  We began dissecting out some fatty adhesions down near the neck of the gallbladder until a cystic duct was encountered.  We continued our dissection using combination of sharp and blunt dissection until the cystic duct was largely dissected out.  We continued our dissection up along the sides of the gallbladder, both medially and laterally, until we had created a space between the gallbladder and the liver.  At this point, we encountered the cystic artery, just posterior and lateral to the cystic duct.  This again was dissected out.  Once we had created a window where only the cystic artery and duct were noted to be entering the gallbladder, we felt that this represented our critical view.  The cystic artery and duct were then doubly clipped and divided.  We continued our dissection up along the body of the gallbladder, freeing all attachments and adhesions of the gallbladder to the liver.  Gallbladder was removed from the liver in  an atraumatic fashion.  The gallbladder was then brought up through the umbilical port site and removed from the abdomen.  The gallbladder fossa was reinspected, and all areas of bleeding were managed with electrocautery.  We irrigated the area with normal saline and aspirated it out.  We then removed the umbilical port trocar and closed the fascia with a figure-of-eight 0 Vicryl suture.  This was done using the Shar-Nargis device.  We then reinspected the abdomen, and everything appeared to be in pristine condition.  We removed the trocars under laparoscopic visualization and desufflated the abdomen with the Ismael suction .  The skin edges were reapproximated with 4-0 Vicryl and Steri-Strips.  The patient was uneventfully extubated, awakened and taken to the PACU in stable condition.  At the conclusion of the case, all lap and needle counts were correct.      ESTIMATED BLOOD LOSS:  10 mL    INTRAOPERATIVE FINDINGS:  Contracted gallbladder with stones.    Jacoby Tapia MD, MD

## 2017-09-27 NOTE — IP AVS SNAPSHOT
[facility]    [address]    [place] [address]    Phone:  [phone]    Fax:  [phone]                                       After Visit Summary   9/27/2017    Shirley Hendricks    MRN: [id number]           After Visit Summary Signature Page     I have received my discharge instructions, and my questions have been answered. I have discussed any challenges I see with this plan with the nurse or doctor.    ..........................................................................................................................................  Patient/Patient Representative Signature      ..........................................................................................................................................  Patient Representative Print Name and Relationship to Patient    ..................................................               ................................................  Date                                            Time    ..........................................................................................................................................  Reviewed by Signature/Title    ...................................................              ..............................................  Date                                                            Time

## 2017-09-27 NOTE — DISCHARGE INSTRUCTIONS
Same Day Surgery Discharge Instructions for  Sedation and General Anesthesia       It's not unusual to feel dizzy, light-headed or faint for up to 24 hours after surgery or while taking pain medication.  If you have these symptoms: sit for a few minutes before standing and have someone assist you when you get up to walk or use the bathroom.      You should rest and relax for the next 24 hours. We recommend you make arrangements to have an adult stay with you for at least 24 hours after your discharge.  Avoid hazardous and strenuous activity.      DO NOT DRIVE any vehicle or operate mechanical equipment for 24 hours following the end of your surgery.  Even though you may feel normal, your reactions may be affected by the medication you have received.      Do not drink alcoholic beverages for 24 hours following surgery.       Slowly progress to your regular diet as you feel able. It's not unusual to feel nauseated and/or vomit after receiving anesthesia.  If you develop these symptoms, drink clear liquids (apple juice, ginger ale, broth, 7-up, etc. ) until you feel better.  If your nausea and vomiting persists for 24 hours, please notify your surgeon.        All narcotic pain medications, along with inactivity and anesthesia, can cause constipation. Drinking plenty of liquids and increasing fiber intake will help.      For any questions of a medical nature, call your surgeon.      Do not make important decisions for 24 hours.      If you had general anesthesia, you may have a sore throat for a couple of days related to the breathing tube used during surgery.  You may use Cepacol lozenges to help with this discomfort.  If it worsens or if you develop a fever, contact your surgeon.       If you feel your pain is not well managed with the pain medications prescribed by your surgeon, please contact your surgeon's office to let them know so they can address your concerns.         Resume Aspirin and Plavix tomorrow.

## 2017-09-27 NOTE — IP AVS SNAPSHOT
MRN:[id number]                      After Visit Summary   9/27/2017    Shirley Hendricks    MRN: [id number]           Thank you!     Thank you for choosing [facility] for your care. Our goal is always to provide you with excellent care. Hearing back from our patients is one way we can continue to improve our services. Please take a few minutes to complete the written survey that you may receive in the mail after you visit with us. Thank you!        Patient Information     Date Of Birth          1960        About your hospital stay     You were admitted on:  September 27, 2017 [facility]    You were discharged on:  September 27, 2017       Who to Call     For medical emergencies, please call [phone].  For non-urgent questions about your medical care, please call your primary care provider or clinic, [phone]  For questions related to your surgery, please call your surgery clinic        Attending Provider     Provider Specialty    Jacoby Tapia MD Surgery       Primary Care Provider Office Phone # Fax #    Vasquez Benoit [phone]      After Care Instructions     Diet Instructions       Resume pre-procedure diet            Discharge Instructions       Please follow-up in 2 weeks in our office for your first postoperative appointment. Call [phone] to schedule.  Our clinic[facility] name [facility]. The address is [facility] [address], [place], [address]            Discharge Instructions       CONSTIPATION PREVENTION  We recommend that you go to a pharmacy and purchase ONE of the following stool softeners/laxatives and start taking today to prevent constipation. You can stop this bowel regimen once you are no longer taking your narcotic pain medication or are having regular bowel movements:    - Senokot oral once daily  - Milk of magnesium once daily  - Docusate Sodium 1 pill twice daily (stool  softener)  - Miralax 1 scoop/packet 1-2 times daily (laxative)    In addition to the above options, if constipation continues, you can add:   - 4 oz Prune juice or Plum juice daily for constipation            Discharge Instructions       RESUME YOUR PLAVIX AND ASPIRIN ON Thursday, 9/28/2017.            Dressing       Keep dressings clean and dry. Remove the bandaids on post-op day #2 (Friday). Do not remove the small white steri strips over your incision. These will typically fall off on their own in 7-10 days. Do not attempt to replace these if they should fall off sooner. On post-op day #2, after you've removed the bandaids, you can shower normally. You can allow warm water and soap to run over the incision. Do not scrub the incision. After showering pat your skin and steri strips dry. Do not submerge or soak your incisions under water for 2 weeks.            No Alcohol       For 24 hours post procedure            No driving or operating machinery        until the day after procedure            No lifting        No lifting over 20 lbs and no strenuous physical activity for 2-3 weeks                  Your next 10 appointments already scheduled     Nov 07, 2017  9:30 AM CST   Office Visit with Vasquez Benoit MD   [facility] ([facility])    [address] [address] [phone]           Bring a current list of meds and any records pertaining to this visit. For Physicals, please bring immunization records and any forms needing to be filled out. Please arrive 10 minutes early to complete paperwork.            Feb 15, 2018  9:45 AM CST   US LOWER EXTREMITY ARTERIAL DUPLEX LEFT with SHVUS2   [facility]    [address]  [place] [address]   [phone]           Please bring a list of your medicines (including vitamins, minerals and over-the-counter drugs).  Also, tell your doctor about any allergies you may have. Wear comfortable clothes and leave your valuables at home.  You do not need to do anything special to prepare for your exam.  Please call the Imaging Department at your exam site with any questions.            Feb 15, 2018 10:30 AM CST   US AORTA/IVC/ILIAC DUPLEX COMPLETE with SHVUS2   [facility] ([facility])    [address]  [facility] [address]   [phone]           Please bring a list of your medicines (including vitamins, minerals and over-the-counter drugs). Also, tell your doctor about any allergies you may have. Wear comfortable clothes and leave your valuables at home.  Adults: No eating or drinking for 8 hours before the exam. You may take medicine with a small sip of water.  Children: - Children 6+ years: No food or drink for 6 hours before exam. - Children 1-5 years: No food or drink for 4 hours before exam. - Infants, breast-fed: may have breast milk up to 2 hours before exam. - Infants, formula: may have bottle until 4 hours before exam.  Please call the Imaging Department at your exam site with any questions.            Feb 15, 2018 11:15 AM CST   US XAVIER DOPPLER WITH EXERCISE with SHVUS2   [facility] ([facility])    [address]  [facility] [address]   [phone]           Please bring a list of your medicines (including vitamins, minerals and over-the-counter drugs). Also, tell your doctor about any allergies you may have. Wear comfortable clothes and leave your valuables at home.  No caffeine or tobacco for 1 hour prior to exam.  Please call the Imaging Department at your exam site with any questions.            Feb 15, 2018 11:45 AM CST   Return Visit with Chadwick Lozano MD   [facility] ([facility])    [address]  [facility] [phone]              Further instructions from your care team       Same Day Surgery Discharge Instructions for  Sedation and General Anesthesia       It's not unusual to feel dizzy, light-headed or faint for up to 24 hours after surgery or while taking pain medication.  If you have these symptoms: sit for a few minutes before standing and have someone assist you when you get up to walk or use the bathroom.      You should rest and relax for the next 24 hours. We recommend you make arrangements to have an adult stay with you for at least 24 hours after your discharge.  Avoid hazardous and strenuous activity.      DO NOT DRIVE any vehicle or operate mechanical equipment for 24 hours following the end of your surgery.  Even though you may feel normal, your reactions may be affected by the medication you have received.      Do not drink alcoholic beverages for 24 hours following surgery.       Slowly progress to your regular diet as you feel able. It's not unusual to feel nauseated and/or vomit after receiving anesthesia.  If you develop these symptoms, drink clear liquids (apple juice, ginger ale, broth, 7-up, etc. ) until you feel better.  If your nausea and vomiting persists for 24 hours, please notify your surgeon.        All narcotic pain medications, along with inactivity and anesthesia, can cause constipation. Drinking plenty of liquids and increasing fiber intake will help.      For any questions of a medical nature, call your surgeon.      Do not make important decisions for 24 hours.      If you had general anesthesia, you may have a sore throat for a couple of days related to the breathing tube used during surgery.  You may use Cepacol lozenges to help with this discomfort.  If it worsens or if you develop a fever, contact your surgeon.       If you feel your pain is not well managed with the pain medications prescribed by your surgeon, please contact your surgeon's  "office to let them know so they can address your concerns.         Resume Aspirin and Plavix tomorrow.    Pending Results     Date and Time Order Name Status Description    9/27/2017 0903 Surgical pathology exam In process             Admission Information     Date & Time Provider Department Dept. Phone    9/27/2017 Jacoby Tapia MD [facility] Same Day Surgery [phone]      Your Vitals Were     Blood Pressure Temperature Respirations Height Weight Pulse Oximetry    159/92 96.6  F (35.9  C) (Temporal) 12 1.568 m (5' 1.75\") 59.1 kg (130 lb 6.4 oz) 91%    BMI (Body Mass Index)                   24.04 kg/m2           MyChart Information     [facility] gives you secure access to your electronic health record. If you see a primary care provider, you can also send messages to your care team and make appointments. If you have questions, please call your primary care clinic.  If you do not have a primary care provider, please call [phone] and they will assist you.        Care EveryWhere ID     This is your Care EveryWhere ID. This could be used by other organizations to access your [facility] medical records  [id number]        Equal Access to Services     JAEL CRUMP : Hadii adela Vásquez, waalfonsoda marcia, qaybta kaalmada angelica, alesia cerda. So [facility] [phone].    ATENCIÓN: Si habla español, tiene a mahmood disposición servicios gratuitos de asistencia lingüística. Llame al [phone].    We comply with applicable federal civil rights laws and [place] laws. We do not discriminate on the basis of race, color, national origin, age, disability sex, sexual orientation or gender identity.               Review of your medicines      START taking        Dose / Directions    oxyCODONE 5 MG IR tablet   Commonly known as:  ROXICODONE   Used for:  Symptomatic cholelithiasis   Notes to Patient:  1 tablet taken at 11:01        Dose:  5-10 mg   Take 1-2 tablets (5-10 mg) " by mouth every 3 hours as needed for pain or other (Moderate to Severe)   Quantity:  30 tablet   Refills:  0         CONTINUE these medicines which have NOT CHANGED        Dose / Directions    AIRBORNE Tbef   Used for:  Scurvy, Low iron        Dose:  1 tablet   Take 1 tablet by mouth every morning INDICATION: VITAMIN C SUPPLEMENT   Quantity:  30 tablet   Refills:  11       albuterol 108 (90 BASE) MCG/ACT Inhaler   Commonly known as:  PROAIR HFA/PROVENTIL HFA/VENTOLIN HFA   Used for:  Chronic obstructive pulmonary disease, unspecified COPD type (H)        Dose:  2 puff   Inhale 2 puffs into the lungs every 6 hours as needed for shortness of breath / dyspnea or wheezing   Quantity:  1 Inhaler   Refills:  prn       ASPIRIN EC PO        Dose:  81 mg   Take 81 mg by mouth daily   Refills:  0       atorvastatin 80 MG tablet   Commonly known as:  LIPITOR   Used for:  PAD (peripheral artery disease) (H), Hyperlipidemia LDL goal <70        TAKE ONE TABLET (80MG) BY MOUTH EVERY EVENING INDICATION:TO LOWER CHOLESTEROL AND TO HELP REDUCE RISK OF REOCURRENCE OF HEAR ATTACK STROKE,A   Quantity:  90 tablet   Refills:  2       B COMPLEX 50 Tabs   Used for:  Neuropathy (H)        Dose:  1 tablet   Take 1 tablet by mouth every other day INDICATION: VITAMIN SUPPLEMENT   Quantity:  100 tablet   Refills:  PRN       buPROPion 300 MG 24 hr tablet   Commonly known as:  WELLBUTRIN XL   Used for:  Tobacco use disorder, Situational stress        Dose:  300 mg   Take 1 tablet (300 mg) by mouth every morning INDICATION: SMOKING CESSATION AND SITUATIONAL STRESS   Quantity:  90 tablet   Refills:  3       Calcium Carbonate-Vitamin D3 600-400 MG-UNIT Tabs   Commonly known as:  CALCIUM 600-D   Used for:  Vitamin D deficiency        Dose:  1 tablet   Take 1 tablet by mouth 2 times daily (with meals)   Quantity:  100 tablet   Refills:  PRN       cholecalciferol 2000 UNITS Caps   Used for:  Vitamin D deficiency        Dose:  2 capsule   Take 2 capsules  by mouth daily FOR VITAMIN D DEFICIENCY (LOW VITAMIN D)   Quantity:  100 capsule   Refills:  PRN       clopidogrel 75 MG tablet   Commonly known as:  PLAVIX   Used for:  Peripheral vascular disease, unspecified (H), History of TIA (transient ischemic attack) and stroke        TAKE ONE TABLET BY MOUTH ONE TIME DAILY   Quantity:  90 tablet   Refills:  0       cyabnocobalamin 2500 MCG sublingual tablet   Commonly known as:  VITAMIN B-12   Used for:  Neuropathy (H)        Dose:  2500 mcg   Take 2,500 mcg by mouth daily INDICATION: FOR VITAMIN B12 SUPPLEMENTATION - TO IMPROVE MEMORY, BALANCE, SLEEP AND MOOD, DIRECTIONS: PLEASE PLACE UNDER THE TONGUE TO DISSOLVE   Quantity:  100 tablet   Refills:  3       denosumab 60 MG/ML Soln injection   Commonly known as:  PROLIA   Used for:  Osteoporosis        Dose:  60 mg   Inject 1 mL (60 mg) Subcutaneous every 6 months INDICATION: TO TREAT OSTEOPOROSIS   Quantity:  1 Syringe   Refills:  PRN       ezetimibe 10 MG tablet   Commonly known as:  ZETIA   Used for:  PAD (peripheral artery disease) (H), Coronary artery disease involving native coronary artery of native heart without angina pectoris        TAKE 1 TABLET (10 MG) BY MOUTH DAILY INDICATION: TO LOWER CHOLESTEROL   Quantity:  90 tablet   Refills:  PRN       fluticasone-vilanterol 200-25 MCG/INH oral inhaler   Commonly known as:  BREO ELLIPTA   Used for:  Chronic obstructive pulmonary disease, unspecified COPD type (H), Tobacco use disorder        Dose:  1 puff   Inhale 1 puff into the lungs daily INDICATION: COPD CONTROLLER   Quantity:  1 Inhaler   Refills:  11       lisinopril 40 MG tablet   Commonly known as:  PRINIVIL/ZESTRIL   Used for:  PAD (peripheral artery disease) (H)        Dose:  40 mg   Take 1 tablet (40 mg) by mouth daily INDICATION:TO LOWER BLOOD PRESSURE AND TO PRESERVE KIDNEY FUNCTION   Quantity:  90 tablet   Refills:  2       loratadine 10 MG tablet   Commonly known as:  CLARITIN   Used for:  Chronic allergic  rhinitis due to animal hair and dander        Dose:  10 mg   Take 1 tablet (10 mg) by mouth daily INDICATION: TO CONTROL ALLERGY SYMPTOMS   Quantity:  90 tablet   Refills:  3       nitroGLYcerin 0.4 MG sublingual tablet   Commonly known as:  NITROSTAT   Used for:  Personal history of MI (myocardial infarction)        Dose:  0.4 mg   Place 1 tablet (0.4 mg) under the tongue See Admin Instructions for chest pain   Quantity:  15 tablet   Refills:  7       OMEGA-3 FISH OIL PO        Dose:  2 capsule   Take 2 capsules by mouth 2 times daily   Refills:  0       omeprazole 20 MG CR capsule   Commonly known as:  priLOSEC   Used for:  Reflux esophagitis        TAKE 1 CAPSULE (20 MG) BY MOUTH DAILY INDICATION: TO CONTROL REFLUX SYMPTOMS   Quantity:  90 capsule   Refills:  2       order for DME   Used for:  HTN (hypertension)        Equipment being ordered: BP MACHINE WITH PULSE MONITOR   Quantity:  1 Device   Refills:  PRN       TOPROL XL PO        Dose:  12.5 mg   Take 12.5 mg by mouth 2 times daily (0.5 x 25 mg tablet = 12.5 mg dose) INDICATION: TO LOWER BLOOD PRESSURE AND HELP PREVENT HEART DISEASE   Refills:  0       TRAMADOL HCL PO        Dose:  50 mg   Take 50 mg by mouth daily as needed for moderate to severe pain   Refills:  0       TYLENOL PO        Dose:  7909-9000 mg   Take 1,000-1,500 mg by mouth every 6 hours as needed for mild pain or fever   Refills:  0            Where to get your medicines      Some of these will need a paper prescription and others can be bought over the counter. Ask your nurse if you have questions.     Bring a paper prescription for each of these medications     oxyCODONE 5 MG IR tablet                Protect others around you: Learn how to safely use, store and throw away your medicines at www.disposemymeds.org.             Medication List: This is a list of all your medications and when to take them. Check marks below indicate your daily home schedule. Keep this list as a reference.       Medications           Morning Afternoon Evening Bedtime As Needed    AIRBORNE Tbef   Take 1 tablet by mouth every morning INDICATION: VITAMIN C SUPPLEMENT                                albuterol 108 (90 BASE) MCG/ACT Inhaler   Commonly known as:  PROAIR HFA/PROVENTIL HFA/VENTOLIN HFA   Inhale 2 puffs into the lungs every 6 hours as needed for shortness of breath / dyspnea or wheezing                                ASPIRIN EC PO   Take 81 mg by mouth daily                                atorvastatin 80 MG tablet   Commonly known as:  LIPITOR   TAKE ONE TABLET (80MG) BY MOUTH EVERY EVENING INDICATION:TO LOWER CHOLESTEROL AND TO HELP REDUCE RISK OF REOCURRENCE OF HEAR ATTACK STROKE,A                                B COMPLEX 50 Tabs   Take 1 tablet by mouth every other day INDICATION: VITAMIN SUPPLEMENT                                buPROPion 300 MG 24 hr tablet   Commonly known as:  WELLBUTRIN XL   Take 1 tablet (300 mg) by mouth every morning INDICATION: SMOKING CESSATION AND SITUATIONAL STRESS                                Calcium Carbonate-Vitamin D3 600-400 MG-UNIT Tabs   Commonly known as:  CALCIUM 600-D   Take 1 tablet by mouth 2 times daily (with meals)                                cholecalciferol 2000 UNITS Caps   Take 2 capsules by mouth daily FOR VITAMIN D DEFICIENCY (LOW VITAMIN D)                                clopidogrel 75 MG tablet   Commonly known as:  PLAVIX   TAKE ONE TABLET BY MOUTH ONE TIME DAILY                                cyabnocobalamin 2500 MCG sublingual tablet   Commonly known as:  VITAMIN B-12   Take 2,500 mcg by mouth daily INDICATION: FOR VITAMIN B12 SUPPLEMENTATION - TO IMPROVE MEMORY, BALANCE, SLEEP AND MOOD, DIRECTIONS: PLEASE PLACE UNDER THE TONGUE TO DISSOLVE                                denosumab 60 MG/ML Soln injection   Commonly known as:  PROLIA   Inject 1 mL (60 mg) Subcutaneous every 6 months INDICATION: TO TREAT OSTEOPOROSIS                                ezetimibe  10 MG tablet   Commonly known as:  ZETIA   TAKE 1 TABLET (10 MG) BY MOUTH DAILY INDICATION: TO LOWER CHOLESTEROL                                fluticasone-vilanterol 200-25 MCG/INH oral inhaler   Commonly known as:  BREO ELLIPTA   Inhale 1 puff into the lungs daily INDICATION: COPD CONTROLLER                                lisinopril 40 MG tablet   Commonly known as:  PRINIVIL/ZESTRIL   Take 1 tablet (40 mg) by mouth daily INDICATION:TO LOWER BLOOD PRESSURE AND TO PRESERVE KIDNEY FUNCTION                                loratadine 10 MG tablet   Commonly known as:  CLARITIN   Take 1 tablet (10 mg) by mouth daily INDICATION: TO CONTROL ALLERGY SYMPTOMS                                nitroGLYcerin 0.4 MG sublingual tablet   Commonly known as:  NITROSTAT   Place 1 tablet (0.4 mg) under the tongue See Admin Instructions for chest pain                                OMEGA-3 FISH OIL PO   Take 2 capsules by mouth 2 times daily                                omeprazole 20 MG CR capsule   Commonly known as:  priLOSEC   TAKE 1 CAPSULE (20 MG) BY MOUTH DAILY INDICATION: TO CONTROL REFLUX SYMPTOMS                                order for DME   Equipment being ordered: BP MACHINE WITH PULSE MONITOR                                oxyCODONE 5 MG IR tablet   Commonly known as:  ROXICODONE   Take 1-2 tablets (5-10 mg) by mouth every 3 hours as needed for pain or other (Moderate to Severe)   Last time this was given:  5 mg on 9/27/2017 11:01 AM   Notes to Patient:  1 tablet taken at 11:01                                TOPROL XL PO   Take 12.5 mg by mouth 2 times daily (0.5 x 25 mg tablet = 12.5 mg dose) INDICATION: TO LOWER BLOOD PRESSURE AND HELP PREVENT HEART DISEASE                                TRAMADOL HCL PO   Take 50 mg by mouth daily as needed for moderate to severe pain                                TYLENOL PO   Take 1,000-1,500 mg by mouth every 6 hours as needed for mild pain or fever

## 2017-09-27 NOTE — ANESTHESIA PREPROCEDURE EVALUATION
Anesthesia Evaluation     .             ROS/MED HX    ENT/Pulmonary:     (+)tobacco use, Past use asthma COPD, , . .    Neurologic:     (+)neuropathy CVA TIA     Cardiovascular:     (+) hypertension-Peripheral Vascular Disease-- Carotid Stenosis, CAD, -past MI,-. : . . . :. .       METS/Exercise Tolerance:     Hematologic:         Musculoskeletal:         GI/Hepatic:     (+) GERD Asymptomatic on medication,       Renal/Genitourinary:         Endo:         Psychiatric:         Infectious Disease:         Malignancy:         Other:    (+) H/O Chronic Pain,                   Physical Exam  Normal systems: cardiovascular, pulmonary and dental    Airway   Mallampati: II  TM distance: >3 FB  Neck ROM: full    Dental     Cardiovascular   Rhythm and rate: regular and normal      Pulmonary    breath sounds clear to auscultation                    Anesthesia Plan      History & Physical Review  History and physical reviewed and following examination; no interval change.    ASA Status:  3 .        Plan for General and ETT with Propofol induction. Maintenance will be Inhalation.    PONV prophylaxis:  Ondansetron (or other 5HT-3) and Dexamethasone or Solumedrol       Postoperative Care  Postoperative pain management:  IV analgesics and Oral pain medications.      Consents  Anesthetic plan, risks, benefits and alternatives discussed with:  Patient..                          .

## 2017-09-27 NOTE — BRIEF OP NOTE
[facility] Brief Operative Note    Pre-operative diagnosis: GALLSTONE   Post-operative diagnosis Cholelithiasis   Procedure: Procedure(s):  LAPAROSCOPIC CHOLECYSTECTOMY - Wound Class: II-Clean Contaminated   Surgeon(s): Surgeon(s) and Role:     * Jacoby Tapia MD - Primary     * Kera Pugh PA-C - Assisting   Estimated blood loss: 5 mL    Specimens:   ID Type Source Tests Collected by Time Destination   A : gallbladder and contents Tissue Gallbladder and Contents SURGICAL PATHOLOGY EXAM Jacoby Tapia MD 9/27/2017  9:02 AM       Findings: No immediate complications. See operative report for full details.       Kera Pugh PA-C  [facility]  [phone]

## 2017-09-27 NOTE — PROGRESS NOTES
Admission medication history interview status for the 9/27/2017  admission is complete. See EPIC admission navigator for prior to admission medications     Medication history source reliability:Moderate    Medication history interview source(s):Patient    Medication history resources (including written lists, pill bottles, clinic record):None    Primary pharmacy.[facility]    Additional medication history information not noted on PTA med list :None    Time spent in this activity: 40 minutes    Prior to Admission medications    Medication Sig Last Dose Taking? Auth Provider   Metoprolol Succinate (TOPROL XL PO) Take 12.5 mg by mouth 2 times daily (0.5 x 25 mg tablet = 12.5 mg dose) INDICATION: TO LOWER BLOOD PRESSURE AND HELP PREVENT HEART DISEASE 9/27/2017 at 0600 Yes Reported, Patient   TRAMADOL HCL PO Take 50 mg by mouth daily as needed for moderate to severe pain 9/23/2017 at PRN Yes Reported, Patient   cholecalciferol 2000 UNITS CAPS Take 2 capsules by mouth daily FOR VITAMIN D DEFICIENCY (LOW VITAMIN D) 9/26/2017 at AM Yes Eun Narvaez MD   cyabnocobalamin (VITAMIN B-12) 2500 MCG sublingual tablet Take 2,500 mcg by mouth daily INDICATION: FOR VITAMIN B12 SUPPLEMENTATION - TO IMPROVE MEMORY, BALANCE, SLEEP AND MOOD, DIRECTIONS: PLEASE PLACE UNDER THE TONGUE TO DISSOLVE 9/26/2017 at AM Yes Eun Narvaez MD   Multiple Vitamins-Minerals (AIRBORNE) TBEF Take 1 tablet by mouth every morning INDICATION: VITAMIN C SUPPLEMENT 9/26/2017 at AM Yes Eun Narvaez MD   albuterol (PROAIR HFA/PROVENTIL HFA/VENTOLIN HFA) 108 (90 BASE) MCG/ACT Inhaler Inhale 2 puffs into the lungs every 6 hours as needed for shortness of breath / dyspnea or wheezing One Month ago at PRN Yes Eun Narvaez MD   B Complex Vitamins (B COMPLEX 50) TABS Take 1 tablet by mouth every other day INDICATION: VITAMIN SUPPLEMENT 9/26/2017 at AM Yes Enu Narvaez MD   lisinopril (PRINIVIL/ZESTRIL) 40 MG tablet Take 1 tablet (40 mg) by  mouth daily INDICATION:TO LOWER BLOOD PRESSURE AND TO PRESERVE KIDNEY FUNCTION 9/27/2017 at 0600 Yes Eun Narvaez MD   ezetimibe (ZETIA) 10 MG tablet TAKE 1 TABLET (10 MG) BY MOUTH DAILY INDICATION: TO LOWER CHOLESTEROL 9/27/2017 at 0600 Yes Eun Narvaez MD   Calcium Carbonate-Vitamin D3 (CALCIUM 600-D) 600-400 MG-UNIT TABS Take 1 tablet by mouth 2 times daily (with meals) 9/26/2017 at AM Yes Eun Narvaez MD   nitroGLYcerin (NITROSTAT) 0.4 MG sublingual tablet Place 1 tablet (0.4 mg) under the tongue See Admin Instructions for chest pain More than a Year at PRN Yes Eun Narvaez MD   loratadine (CLARITIN) 10 MG tablet Take 1 tablet (10 mg) by mouth daily INDICATION: TO CONTROL ALLERGY SYMPTOMS 9/27/2017 at 0600 Yes Eun Narvaez MD   omeprazole (PRILOSEC) 20 MG CR capsule TAKE 1 CAPSULE (20 MG) BY MOUTH DAILY INDICATION: TO CONTROL REFLUX SYMPTOMS 9/25/2017 at PM Yes Eun Narvaez MD   Acetaminophen (TYLENOL PO) Take 1,000-1,500 mg by mouth every 6 hours as needed for mild pain or fever  9/26/2017 at AM Yes Reported, Patient   Omega-3 Fatty Acids (OMEGA-3 FISH OIL PO) Take 2 capsules by mouth 2 times daily 9/19/2017 Yes Reported, Patient   ASPIRIN EC PO Take 81 mg by mouth daily Past Month at Unknown time Yes Reported, Patient   denosumab (PROLIA) 60 MG/ML SOLN Inject 1 mL (60 mg) Subcutaneous every 6 months INDICATION: TO TREAT OSTEOPOROSIS 7/16/2017 Yes Eun Narvaez MD   fluticasone-vilanterol (BREO ELLIPTA) 200-25 MCG/INH oral inhaler Inhale 1 puff into the lungs daily INDICATION: COPD CONTROLLER Not yet started  Eun Narvaez MD   atorvastatin (LIPITOR) 80 MG tablet TAKE ONE TABLET (80MG) BY MOUTH EVERY EVENING INDICATION:TO LOWER CHOLESTEROL AND TO HELP REDUCE RISK OF REOCURRENCE OF HEAR ATTACK STROKE,A Not yet started  Eun Narvaez MD   buPROPion (WELLBUTRIN XL) 300 MG 24 hr tablet Take 1 tablet (300 mg) by mouth every morning INDICATION: SMOKING  CESSATION AND SITUATIONAL STRESS Not yet started  Eun Narvaez MD   clopidogrel (PLAVIX) 75 MG tablet TAKE ONE TABLET BY MOUTH ONE TIME DAILY  9/17/2017 at PM  Eun Narvaez MD   ORDER FOR DME Equipment being ordered: BP MACHINE WITH PULSE MONITOR   Eun aNrvaez MD

## 2017-09-27 NOTE — ANESTHESIA POSTPROCEDURE EVALUATION
Patient: Shirley Hendricks    Procedure(s):  LAPAROSCOPIC CHOLECYSTECTOMY - Wound Class: II-Clean Contaminated    Diagnosis:GALLSTONE  Diagnosis Additional Information: No value filed.    Anesthesia Type:  General, ETT    Note:  Anesthesia Post Evaluation    Patient location during evaluation: PACU  Patient participation: Able to fully participate in evaluation  Level of consciousness: awake  Pain management: adequate  Airway patency: patent  Cardiovascular status: acceptable  Respiratory status: acceptable  Hydration status: acceptable  PONV: none     Anesthetic complications: None          Last vitals:  Vitals:    09/27/17 1045 09/27/17 1100 09/27/17 1115   BP: 138/71 155/78 136/90   Resp: 12 14 16   Temp:  36.1  C (97  F)    SpO2: 95% 99% 98%         Electronically Signed By: Markus Greco MD  September 27, 2017  11:44 AM

## 2017-09-27 NOTE — ANESTHESIA CARE TRANSFER NOTE
Patient: Shirley Hendricks    Procedure(s):  LAPAROSCOPIC CHOLECYSTECTOMY - Wound Class: II-Clean Contaminated    Diagnosis: GALLSTONE  Diagnosis Additional Information: No value filed.    Anesthesia Type:   General, ETT     Note:  Airway :Face Mask and LMA  Patient transferred to:PACU  Comments: 924:  To par spontaneous respirations, dentition unchanged from pre-op.      Vitals: (Last set prior to Anesthesia Care Transfer)    CRNA VITALS  9/27/2017 0853 - 9/27/2017 0923      9/27/2017             Pulse: 77    SpO2: 98 %    Resp Rate (set): 10                Electronically Signed By: NOELLE Lozano CRNA  September 27, 2017  9:23 AM

## 2017-09-27 NOTE — LETTER
[facility] SAME DAY SURGERY  [address], [address]  [facility] [address]  [phone]          September 27, 2017    RE:  Shirley Hendricks                                                                                                          To whom it may concern:      Shirley Hendricks is under my professional care for her recent surgery. She  may return to work on Monday, 10/02/2017, without any restrictions.         Sincerely,                Kera Pugh PA-C  [facility]  [phone]

## 2017-09-28 LAB — COPATH REPORT: NORMAL

## 2017-09-29 ENCOUNTER — TELEPHONE (OUTPATIENT)
Dept: SURGERY | Facility: CLINIC | Age: 57
End: 2017-09-29

## 2017-09-29 NOTE — TELEPHONE ENCOUNTER
Attempted to call patient and do post op check with them , left VM with call back ph.# if they wished to call clinic back.    Trina Galvan RN BSN

## 2017-10-10 ENCOUNTER — OFFICE VISIT (OUTPATIENT)
Dept: SURGERY | Facility: CLINIC | Age: 57
End: 2017-10-10
Payer: COMMERCIAL

## 2017-10-10 VITALS — HEART RATE: 64 BPM | DIASTOLIC BLOOD PRESSURE: 80 MMHG | SYSTOLIC BLOOD PRESSURE: 126 MMHG | RESPIRATION RATE: 16 BRPM

## 2017-10-10 DIAGNOSIS — Z09 SURGERY FOLLOW-UP EXAMINATION: Primary | ICD-10-CM

## 2017-10-10 PROCEDURE — 99024 POSTOP FOLLOW-UP VISIT: CPT | Performed by: SURGERY

## 2017-10-10 NOTE — LETTER
[address], [address]  [place] [address]  [phone]  F: [phone]    [address], [address]  [place] [address]  [phone]  F: [phone]    [email or web address]  [email or web address]  [email or web address]         October 10, 2017           RE:  Shirley Hendricks           To whom it may concern:    Shirley Hendricks is under my professional care for recent surgery done on 9/27/17.  She may return to work on 10/16/17 with no restrictions. Please call our office if you have any questions, [phone].        Thank you,                   Jacoby Tapia MD// Evon Sequeira MA

## 2017-10-10 NOTE — LETTER
October 10, 2017    Shirley Hendricks : 1960    Lap Qing Postop Note     Shirley Hendricks presents today for surgical followup. she  is doing well following laparoscopic cholecystectomy.  Incisions look fine with no signs of wound infection.  Patient is eating normally and has fairly normal bowel function.  I expect her to make a complete recovery.  Thank you for the opportunity to help in her care.     Sundeep Tapia M.D.  [facility]

## 2017-10-10 NOTE — MR AVS SNAPSHOT
After Visit Summary   10/10/2017    Shirley Hendricks    MRN: [id number]           Patient Information     Date Of Birth          1960        Visit Information        Provider Department      10/10/2017 9:15 AM Jacoby Tapia MD [facility]      Today's Diagnoses     Surgery follow-up examination    -  1       Follow-ups after your visit        Your next 10 appointments already scheduled     Nov 07, 2017  9:30 AM CST   Office Visit with Vasquez Benoit MD   [facility]    [address] [id number] [phone]           Bring a current list of meds and any records pertaining to this visit. For Physicals, please bring immunization records and any forms needing to be filled out. Please arrive 10 minutes early to complete paperwork.            Feb 15, 2018  9:45 AM CST   US LOWER EXTREMITY ARTERIAL DUPLEX LEFT with SHVUS2   [facility] ([facility])    [address]  [facility] [address]   [phone]           Please bring a list of your medicines (including vitamins, minerals and over-the-counter drugs). Also, tell your doctor about any allergies you may have. Wear comfortable clothes and leave your valuables at home.  You do not need to do anything special to prepare for your exam.  Please call the Imaging Department at your exam site with any questions.            Feb 15, 2018 10:30 AM CST   US AORTA/IVC/ILIAC DUPLEX COMPLETE with SHVUS2   [facility] ([facility])    [address]  [facility] [address]   [phone]           Please bring a list of your medicines (including vitamins, minerals and over-the-counter drugs). Also, tell your doctor about any allergies you may have. Wear comfortable  clothes and leave your valuables at home.  Adults: No eating or drinking for 8 hours before the exam. You may take medicine with a small sip of water.  Children: - Children 6+ years: No food or drink for 6 hours before exam. - Children 1-5 years: No food or drink for 4 hours before exam. - Infants, breast-fed: may have breast milk up to 2 hours before exam. - Infants, formula: may have bottle until 4 hours before exam.  Please call the Imaging Department at your exam site with any questions.            Feb 15, 2018 11:15 AM CST   US XAVIER DOPPLER WITH EXERCISE BILATERAL with SHVUS2   [facility] ([facility])    [address]  [place] [address]   [phone]           Please bring a list of your medicines (including vitamins, minerals and over-the-counter drugs). Also, tell your doctor about any allergies you may have. Wear comfortable clothes and leave your valuables at home.  No caffeine or tobacco for 1 hour prior to exam.  Please call the Imaging Department at your exam site with any questions.            Feb 15, 2018 11:45 AM CST   Return Visit with Chadwick Lozano MD   [facility] ([facility])    [address]  [place] [id number] [phone]              Who to contact     If you have questions or need follow up information about today's clinic visit or your schedule please contact [facility] directly at [phone].  Normal or non-critical lab and imaging results will be communicated to you by MyChart, letter or phone within 4 business days after the clinic has received the results. If you do not hear from us within 7 days, please contact the clinic through MyChart or phone. If you have a critical or abnormal lab result, we will notify you by phone as soon as possible.  Submit refill requests through [facility] or call your  pharmacy and they will forward the refill request to us. Please allow 3 business days for your refill to be completed.          Additional Information About Your Visit        [facility] Information     [facility] gives you secure access to your electronic health record. If you see a primary care provider, you can also send messages to your care team and make appointments. If you have questions, please call your primary care clinic.  If you do not have a primary care provider, please call [phone] and they will assist you.        Care EveryWhere ID     This is your Care EveryWhere ID. This could be used by other organizations to access your [facility] medical records  [id number]        Your Vitals Were     Pulse Respirations                64 16           Blood Pressure from Last 3 Encounters:   10/10/17 126/80   09/27/17 139/72   09/14/17 120/71    Weight from Last 3 Encounters:   09/27/17 130 lb 6.4 oz (59.1 kg)   09/14/17 131 lb (59.4 kg)   09/10/17 131 lb (59.4 kg)              Today, you had the following     No orders found for display       Primary Care Provider Office Phone # Fax #    Vasquez Benoit [phone]       [address] [facility] [address]        Equal Access to Services     JAEL CRUMP : Hadii adela ku hadasho Soomaali, waaxda luqadaha, qaybta kaalmada adeegyada, alesia cerda. So [facility] [phone].    ATENCIÓN: Si habla español, tiene a mahmood disposición servicios gratuitos de asistencia lingüística. Llame al [phone].    We comply with applicable federal civil rights laws and [place] laws. We do not discriminate on the basis of race, color, national origin, age, disability, sex, sexual orientation, or gender identity.            Thank you!     Thank you for choosing [facility]  for your care. Our goal is always to provide you with excellent care. Hearing back from our patients is one way we can continue to improve our services. Please  take a few minutes to complete the written survey that you may receive in the mail after your visit with us. Thank you!             Your Updated Medication List - Protect others around you: Learn how to safely use, store and throw away your medicines at www.disposemymeds.org.          This list is accurate as of: 10/10/17  9:45 AM.  Always use your most recent med list.                   Brand Name Dispense Instructions for use Diagnosis    AIRBORNE Tbef     30 tablet    Take 1 tablet by mouth every morning INDICATION: VITAMIN C SUPPLEMENT    Scurvy, Low iron       albuterol 108 (90 BASE) MCG/ACT Inhaler    PROAIR HFA/PROVENTIL HFA/VENTOLIN HFA    1 Inhaler    Inhale 2 puffs into the lungs every 6 hours as needed for shortness of breath / dyspnea or wheezing    Chronic obstructive pulmonary disease, unspecified COPD type (H)       ASPIRIN EC PO      Take 81 mg by mouth daily        atorvastatin 80 MG tablet    LIPITOR    90 tablet    TAKE ONE TABLET (80MG) BY MOUTH EVERY EVENING INDICATION:TO LOWER CHOLESTEROL AND TO HELP REDUCE RISK OF REOCURRENCE OF HEAR ATTACK STROKE,A    PAD (peripheral artery disease) (H), Hyperlipidemia LDL goal <70       B COMPLEX 50 Tabs     100 tablet    Take 1 tablet by mouth every other day INDICATION: VITAMIN SUPPLEMENT    Neuropathy       buPROPion 300 MG 24 hr tablet    WELLBUTRIN XL    90 tablet    Take 1 tablet (300 mg) by mouth every morning INDICATION: SMOKING CESSATION AND SITUATIONAL STRESS    Tobacco use disorder, Situational stress       Calcium Carbonate-Vitamin D3 600-400 MG-UNIT Tabs    CALCIUM 600-D    100 tablet    Take 1 tablet by mouth 2 times daily (with meals)    Vitamin D deficiency       cholecalciferol 2000 UNITS Caps     100 capsule    Take 2 capsules by mouth daily FOR VITAMIN D DEFICIENCY (LOW VITAMIN D)    Vitamin D deficiency       clopidogrel 75 MG tablet    PLAVIX    90 tablet    TAKE ONE TABLET BY MOUTH ONE TIME DAILY    Peripheral vascular disease,  unspecified, History of TIA (transient ischemic attack) and stroke       cyabnocobalamin 2500 MCG sublingual tablet    VITAMIN B-12    100 tablet    Take 2,500 mcg by mouth daily INDICATION: FOR VITAMIN B12 SUPPLEMENTATION - TO IMPROVE MEMORY, BALANCE, SLEEP AND MOOD, DIRECTIONS: PLEASE PLACE UNDER THE TONGUE TO DISSOLVE    Neuropathy       denosumab 60 MG/ML Soln injection    PROLIA    1 Syringe    Inject 1 mL (60 mg) Subcutaneous every 6 months INDICATION: TO TREAT OSTEOPOROSIS    Osteoporosis       ezetimibe 10 MG tablet    ZETIA    90 tablet    TAKE 1 TABLET (10 MG) BY MOUTH DAILY INDICATION: TO LOWER CHOLESTEROL    PAD (peripheral artery disease) (H), Coronary artery disease involving native coronary artery of native heart without angina pectoris       fluticasone-vilanterol 200-25 MCG/INH oral inhaler    BREO ELLIPTA    1 Inhaler    Inhale 1 puff into the lungs daily INDICATION: COPD CONTROLLER    Chronic obstructive pulmonary disease, unspecified COPD type (H), Tobacco use disorder       lisinopril 40 MG tablet    PRINIVIL/ZESTRIL    90 tablet    Take 1 tablet (40 mg) by mouth daily INDICATION:TO LOWER BLOOD PRESSURE AND TO PRESERVE KIDNEY FUNCTION    PAD (peripheral artery disease) (H)       loratadine 10 MG tablet    CLARITIN    90 tablet    Take 1 tablet (10 mg) by mouth daily INDICATION: TO CONTROL ALLERGY SYMPTOMS    Chronic allergic rhinitis due to animal hair and dander       nitroGLYcerin 0.4 MG sublingual tablet    NITROSTAT    15 tablet    Place 1 tablet (0.4 mg) under the tongue See Admin Instructions for chest pain    Personal history of MI (myocardial infarction)       OMEGA-3 FISH OIL PO      Take 2 capsules by mouth 2 times daily        omeprazole 20 MG CR capsule    priLOSEC    90 capsule    TAKE 1 CAPSULE (20 MG) BY MOUTH DAILY INDICATION: TO CONTROL REFLUX SYMPTOMS    Reflux esophagitis       order for DME     1 Device    Equipment being ordered: BP MACHINE WITH PULSE MONITOR    HTN  (hypertension)       oxyCODONE 5 MG IR tablet    ROXICODONE    30 tablet    Take 1-2 tablets (5-10 mg) by mouth every 3 hours as needed for pain or other (Moderate to Severe)    Symptomatic cholelithiasis       TOPROL XL PO      Take 12.5 mg by mouth 2 times daily (0.5 x 25 mg tablet = 12.5 mg dose) INDICATION: TO LOWER BLOOD PRESSURE AND HELP PREVENT HEART DISEASE        TRAMADOL HCL PO      Take 50 mg by mouth daily as needed for moderate to severe pain        TYLENOL PO      Take 1,000-1,500 mg by mouth every 6 hours as needed for mild pain or fever

## 2017-10-10 NOTE — PROGRESS NOTES
Lap Qing Postop Note    Shirley Hendricks presents today for surgical followup. she  is doing well following laparoscopic cholecystectomy.  Incisions look fine with no signs of wound infection.  Patient is eating normally and has fairly normal bowel function.  I expect her to make a complete recovery.  Thank you for the opportunity to help in her care.    Sundeep Tapia M.D.  [facility]  [phone]    Please route or send letter to:  Primary Care Provider (PCP) and Referring Provider

## 2017-11-05 DIAGNOSIS — Z86.73 HISTORY OF TIA (TRANSIENT ISCHEMIC ATTACK) AND STROKE: ICD-10-CM

## 2017-11-05 DIAGNOSIS — I73.9 PERIPHERAL VASCULAR DISEASE (H): ICD-10-CM

## 2017-11-07 ENCOUNTER — OFFICE VISIT (OUTPATIENT)
Dept: INTERNAL MEDICINE | Facility: CLINIC | Age: 57
End: 2017-11-07
Payer: COMMERCIAL

## 2017-11-07 VITALS
DIASTOLIC BLOOD PRESSURE: 80 MMHG | WEIGHT: 131 LBS | OXYGEN SATURATION: 99 % | TEMPERATURE: 98.1 F | SYSTOLIC BLOOD PRESSURE: 124 MMHG | BODY MASS INDEX: 24.15 KG/M2 | HEART RATE: 63 BPM

## 2017-11-07 DIAGNOSIS — F32.0 MILD MAJOR DEPRESSION (H): ICD-10-CM

## 2017-11-07 DIAGNOSIS — E54 VITAMIN C DEFICIENCY: ICD-10-CM

## 2017-11-07 DIAGNOSIS — I73.9 PVD (PERIPHERAL VASCULAR DISEASE) (H): ICD-10-CM

## 2017-11-07 DIAGNOSIS — I10 ESSENTIAL HYPERTENSION: Primary | ICD-10-CM

## 2017-11-07 DIAGNOSIS — E78.5 HYPERLIPIDEMIA LDL GOAL <70: ICD-10-CM

## 2017-11-07 DIAGNOSIS — K21.00 REFLUX ESOPHAGITIS: ICD-10-CM

## 2017-11-07 DIAGNOSIS — Z12.11 SPECIAL SCREENING FOR MALIGNANT NEOPLASMS, COLON: ICD-10-CM

## 2017-11-07 DIAGNOSIS — F17.200 TOBACCO USE DISORDER: ICD-10-CM

## 2017-11-07 PROBLEM — K80.20 SYMPTOMATIC CHOLELITHIASIS: Status: RESOLVED | Noted: 2017-09-14 | Resolved: 2017-11-07

## 2017-11-07 PROCEDURE — 99214 OFFICE O/P EST MOD 30 MIN: CPT | Performed by: INTERNAL MEDICINE

## 2017-11-07 RX ORDER — CLOPIDOGREL BISULFATE 75 MG/1
TABLET ORAL
Qty: 90 TABLET | Refills: 2 | Status: SHIPPED | OUTPATIENT
Start: 2017-11-07 | End: 2018-08-07

## 2017-11-07 RX ORDER — NICOTINE 21 MG/24HR
1 PATCH, TRANSDERMAL 24 HOURS TRANSDERMAL EVERY 24 HOURS
Qty: 30 PATCH | Refills: 1 | Status: SHIPPED | OUTPATIENT
Start: 2017-11-07 | End: 2018-08-07

## 2017-11-07 RX ORDER — BUPROPION HYDROCHLORIDE 150 MG/1
150 TABLET ORAL EVERY MORNING
Qty: 30 TABLET | Refills: 11 | Status: SHIPPED | OUTPATIENT
Start: 2017-11-07 | End: 2018-08-07

## 2017-11-07 ASSESSMENT — PATIENT HEALTH QUESTIONNAIRE - PHQ9: SUM OF ALL RESPONSES TO PHQ QUESTIONS 1-9: 8

## 2017-11-07 NOTE — NURSING NOTE
"Chief Complaint   Patient presents with     Establish Care     Follow Up For     Per Dr. Narvaez     Smoking Cessation       Initial /80  Pulse 63  Temp 98.1  F (36.7  C) (Oral)  Wt 131 lb (59.4 kg)  SpO2 99%  BMI 24.15 kg/m2 Estimated body mass index is 24.15 kg/(m^2) as calculated from the following:    Height as of 9/27/17: 5' 1.75\" (1.568 m).    Weight as of this encounter: 131 lb (59.4 kg).  Medication Reconciliation: complete  "

## 2017-11-07 NOTE — PROGRESS NOTES
SUBJECTIVE:   Shirley Hendricks is a 57 year old female who presents to clinic today for the following health issues:      Chief Complaint   Patient presents with     Establish Care     Follow Up For     Per Dr. Narvaez     Smoking Cessation     Pt's past medical history, family history, habits, medications and allergies were reviewed with the patient today.   Most recent lab results reviewed with pt. Problem list and histories reviewed & adjusted, as indicated.  Additional history as below:    Transferring care from Dr Narvaez    Pulmonary - Dr Knox. Pt didn't fill Rx for Breo Ellipta from Dr Narvaez. Using Albuterol 1-2x/month only  Smoking 1/2 ppd and wants to quit.  Last quit 2 years ago for 2 mos. Has used Buproprion in past for mood and smoking. Off since January 2017       Review of Systems:  C: NEGATIVE for fever, chills, change in weight  I: NEGATIVE for worrisome rashes, moles or lesions  E: NEGATIVE for vision changes or irritation. Last eye exam April 2017  E/M: NEGATIVE for ear, mouth and throat problems  R: NEGATIVE for significant cough or SOB. Smoking as above  B: NEGATIVE for masses, tenderness or discharge  CV: NEGATIVE for chest pain, palpitations or peripheral edema. Hx PAD. Pains legs after walking 2 blocks. Followed by Dr Lozano. Note Aug 2017 reviewewd  GI: NEGATIVE for nausea, abdominal pain, heartburn, or change in bowel habits. GB out  Late September.   : NEGATIVE for frequency, dysuria, or hematuria. No vaginal sx. No menses age 40s  M: NEGATIVE for significant arthralgias or myalgia.  Using tramadol 3-4 tabs per week  N: NEGATIVE for weakness, dizziness. Occ tingling in hands  E: NEGATIVE for temperature intolerance, skin/hair changes. On lipid therapy  H: NEGATIVE for bleeding problems  P: Stress with  having new blindness. Mild depression. See PHQ. Enjoys  Job as      PHQ-9 ([employer]) 11/7/2017   1.  Little interest or pleasure in doing things 2   2.  Feeling  "down, depressed, or hopeless 1   3.  Trouble falling or staying asleep, or sleeping too much 0   4.  Feeling tired or having little energy 1   5.  Poor appetite or overeating 0   6.  Feeling bad about yourself 1   7.  Trouble concentrating 1   8.  Moving slowly or restless 2   9.  Suicidal or self-harm thoughts 0   PHQ-9 Total Score 8   Difficulty at work, home, or with people Somewhat difficult         OBJECTIVE:  /80  Pulse 63  Temp 98.1  F (36.7  C) (Oral)  Wt 131 lb (59.4 kg)  SpO2 99%  BMI 24.15 kg/m2   Estimated body mass index is 24.15 kg/(m^2) as calculated from the following:    Height as of 9/27/17: 5' 1.75\" (1.568 m).    Weight as of this encounter: 131 lb (59.4 kg).  Eye: PERRL, EOMI  HENT: ear canals and TM's normal and nose and mouth without ulcers or lesions   Neck: no adenopathy. Thyroid normal to palpation.  Left carotid bruit with palpable pulse  Pulm: Lungs clear to auscultation.  No wheeze or rhonchi.  CV: Regular rates and rhythm  GI: Soft, nontender, Normal active bowel sounds, No hepatosplenomegaly or masses palpable  Ext: Peripheral pulses reduced but palpable distal BLE. No edema.  Neuro: Normal strength and tone, sensory exam grossly normal    Assessment/Plan: (See plan discussion below for further details)  1. PVD (peripheral vascular disease) (H)  August vascular surgery note reviewed. Sx stable. Continue walking exercise and meds. Smoking cessation as below.  Follow-up with vascular clinic with imaging in Feb 2018    2. Tobacco use disorder  See  plan discussion below  - buPROPion (WELLBUTRIN XL) 150 MG 24 hr tablet; Take 1 tablet (150 mg) by mouth every morning  Dispense: 30 tablet; Refill: 11  - nicotine (NICODERM CQ) 21 MG/24HR 24 hr patch; Place 1 patch onto the skin every 24 hours  Dispense: 30 patch; Refill: 1    3. Mild major depression (H)   Needs improvement. See plan discussion below  - MENTAL HEALTH REFERRAL  - buPROPion (WELLBUTRIN XL) 150 MG 24 hr tablet; Take 1 " tablet (150 mg) by mouth every morning  Dispense: 30 tablet; Refill: 11    4. Reflux esophagitis  Controlled. HAs recurrence off of med per pt  - omeprazole (PRILOSEC) 20 MG CR capsule; 1 capsule daily as needed (not using daily)  Dispense: 90 capsule; Refill: 2    5. Special screening for malignant neoplasms, colon  Refuses colonoscopy despite MD recommendation for the procedure. FIT test ordered. Reviewed the reduced sensitivity of the FIT test for colon cancer screening compared to colonoscopy and pt states understanding of this  - Fecal colorectal cancer screen (FIT); Future    6. Hyperlipidemia LDL goal <70  Continue statin. Future lab as ordered  - Comprehensive metabolic panel; Future  - Lipid panel reflex to direct LDL Fasting; Future    7. Vitamin C deficiency  Off supplement now. Future lab as ordered  - Vitamin C; Future    8. Essential hypertension  controlled  - metoprolol (TOPROL-XL) 25 MG 24 hr tablet; Take 1 tablet (25 mg) by mouth daily  Dispense: 90 tablet; Refill: 3    Plan discussion:  Change Metoprolol XL to 1 tab daily in AM rather than 1/2 tab twice a day  Fasting labs early/mid January 2018  Nicoderm 21mg patch  Daily. Stop cigs when using  Bupoprion XL 150mg tab, 1 tab daily in AM with food for depression and smoking  Referral to counselling. They will call to schedule or you may call  See me in 1 month for follow-up above issues and review other healthcare maintenance  FIT test.  Future colonoscopy when willing. Pt declines today  One multivtamin daily  Follow-up with Vascular clinic with imaging studies as scheduled         Vasquez Benoit MD  Internal Medicine Department  [facility]

## 2017-11-07 NOTE — MR AVS SNAPSHOT
After Visit Summary   11/7/2017    Shirley Hendricks    MRN: [id number]           Patient Information     Date Of Birth          1960        Visit Information        Provider Department      11/7/2017 9:30 AM Vasquez Benoit MD [facility]        Today's Diagnoses     Mild major depression (H)    -  1    Reflux esophagitis          Care Instructions    Change Metoprolol XL to 1 tab daily in AM rather than 1/2 tab twice a day  Fasting labs early/mid January 2018  Nicoderm 21mg patch  Daily. Stop cigs when using  Bupoprion XL 150mg tab, 1 tab daily in AM with food for depression and smoking  Referral rto counselling. They will call to schedule or you may call  See me in 1 month for follow-up  FIT test.  Future colonoscopy  One multivtamin daily          Follow-ups after your visit        Additional Services     MENTAL HEALTH REFERRAL       Your provider has referred you to: [facility]: [facility] - Counseling (Individual/Couples/Family) - [facility] [phone]   [email or web address]   *Patient will be contacted by [facility] scheduling partner, [facility]), to schedule an appointment.  Patients may also call [facility] to schedule.    All scheduling is subject to the client's specific insurance plan & benefits, provider/location availability, and provider clinical specialities.  Please arrive 15 minutes early for your first appointment and bring your completed paperwork.    Please be aware that coverage of these services is subject to the terms and limitations of your health insurance plan.  Call member services at your health plan with any benefit or coverage questions.                  Your next 10 appointments already scheduled     Feb 15, 2018  9:45 AM CST   US LOWER EXTREMITY ARTERIAL DUPLEX LEFT with SHVUS2   [facility] Ul[facility]ound ([facility] at [facility])    [address]  [place] [address]   [phone]           Please bring a list of your medicines (including vitamins, minerals and over-the-counter drugs). Also, tell your doctor about any allergies you may have. Wear comfortable clothes and leave your valuables at home.  You do not need to do anything special to prepare for your exam.  Please call the Imaging Department at your exam site with any questions.            Feb 15, 2018 10:30 AM CST   US AORTA/IVC/ILIAC DUPLEX COMPLETE with SHVUS2   [facility] Ultrasound ([facility] at [facility])    [address]  [facility] [address]   [phone]           Please bring a list of your medicines (including vitamins, minerals and over-the-counter drugs). Also, tell your doctor about any allergies you may have. Wear comfortable clothes and leave your valuables at home.  Adults: No eating or drinking for 8 hours before the exam. You may take medicine with a small sip of water.  Children: - Children 6+ years: No food or drink for 6 hours before exam. - Children 1-5 years: No food or drink for 4 hours before exam. - Infants, breast-fed: may have breast milk up to 2 hours before exam. - Infants, formula: may have bottle until 4 hours before exam.  Please call the Imaging Department at your exam site with any questions.            Feb 15, 2018 11:15 AM CST   US XAVIER DOPPLER WITH EXERCISE BILATERAL with VUS2   [facility] Ultrasound ([facility])    [address]  [facility] [address]   [phone]           Please bring a list of your medicines (including vitamins, minerals and over-the-counter drugs). Also, tell your doctor about any allergies you may have. Wear comfortable clothes and leave your valuables at home.  No caffeine or tobacco for 1 hour prior to exam.  Please call the Imaging Department at your  exam site with any questions.            Feb 15, 2018 11:45 AM CST   Return Visit with Chadwick Lozano MD   [facility] (V[facility] at [facility])    [address]  [place] [id number] [phone]              Who to contact     If you have questions or need follow up information about today's clinic visit or your schedule please contact [facility] directly at [phone].  Normal or non-critical lab and imaging results will be communicated to you by [employer], letter or phone within 4 business days after the clinic has received the results. If you do not hear from us within 7 days, please contact the clinic through [employer] or phone. If you have a critical or abnormal lab result, we will notify you by phone as soon as possible.  Submit refill requests through [employer] or call your pharmacy and they will forward the refill request to us. Please allow 3 business days for your refill to be completed.          Additional Information About Your Visit        [employer] Information     [employer] gives you secure access to your electronic health record. If you see a primary care provider, you can also send messages to your care team and make appointments. If you have questions, please call your primary care clinic.  If you do not have a primary care provider, please call [phone] and they will assist you.        Care EveryWhere ID     This is your Care EveryWhere ID. This could be used by other organizations to access your [facility] medical records  [id number]        Your Vitals Were     Pulse Temperature Pulse Oximetry BMI (Body Mass Index)          63 98.1  F (36.7  C) (Oral) 99% 24.15 kg/m2         Blood Pressure from Last 3 Encounters:   11/07/17 124/80   10/10/17 126/80   09/27/17 139/72    Weight from Last 3 Encounters:   11/07/17 131 lb (59.4 kg)   09/27/17 130 lb 6.4 oz (59.1 kg)   09/14/17 131 lb (59.4 kg)               We Performed the Following     MENTAL HEALTH REFERRAL          Today's Medication Changes          These changes are accurate as of: 11/7/17 10:23 AM.  If you have any questions, ask your nurse or doctor.               Start taking these medicines.        Dose/Directions    buPROPion 150 MG 24 hr tablet   Commonly known as:  WELLBUTRIN XL   Used for:  Mild major depression (H)   Started by:  Vasquez Benoit MD        Dose:  150 mg   Take 1 tablet (150 mg) by mouth every morning   Quantity:  30 tablet   Refills:  11       nicotine 21 MG/24HR 24 hr patch   Commonly known as:  NICODERM CQ   Used for:  Mild major depression (H)   Started by:  Vasquez Benoit MD        Dose:  1 patch   Place 1 patch onto the skin every 24 hours   Quantity:  30 patch   Refills:  1         These medicines have changed or have updated prescriptions.        Dose/Directions    omeprazole 20 MG CR capsule   Commonly known as:  priLOSEC   This may have changed:  See the new instructions.   Used for:  Reflux esophagitis   Changed by:  Vasquez Benoit MD        1 capsule daily as needed (not using daily)   Quantity:  90 capsule   Refills:  2         Stop taking these medicines if you haven't already. Please contact your care team if you have questions.     order for DME   Stopped by:  Vasquez Benoit MD                Where to get your medicines      These medications [facility] to [id number] [address]  [address] [facility] [address] [facility] [address]     Phone:  [phone]     buPROPion 150 MG 24 hr tablet    nicotine 21 MG/24HR 24 hr patch         Some of these will need a paper prescription and others can be bought over the counter.  Ask your nurse if you have questions.     You don't need a prescription for these medications     omeprazole 20 MG CR capsule                Primary Care Provider Office Phone # Fax #    Vasquez Benoit [phone]       [address] [facility] [address]         Equal Access to Services     [facility] : Hadii adela bruno christin Vásquez, waalfonsoda luqadaha, qaectorta kavidalalesia jimenez. So [facility] [place] [facility] [phone].    ATENCIÓN: Si habla español, tiene a mahmood disposición servicios gratuitos de asistencia lingüística. Christianoame al [phone].    We comply with applicable federal civil rights laws and [place] laws. We do not discriminate on the basis of race, color, national origin, age, disability, sex, sexual orientation, or gender identity.            Thank you!     Thank you for choosing [facility]  for your care. Our goal is always to provide you with excellent care. Hearing back from our patients is one way we can continue to improve our services. Please take a few minutes to complete the written survey that you may receive in the mail after your visit with us. Thank you!             Your Updated Medication List - Protect others around you: Learn how to safely use, store and throw away your medicines at www.disposemymeds.org.          This list is accurate as of: 11/7/17 10:23 AM.  Always use your most recent med list.                   Brand Name Dispense Instructions for use Diagnosis    albuterol 108 (90 BASE) MCG/ACT Inhaler    PROAIR HFA/PROVENTIL HFA/VENTOLIN HFA    1 Inhaler    Inhale 2 puffs into the lungs every 6 hours as needed for shortness of breath / dyspnea or wheezing    Chronic obstructive pulmonary disease, unspecified COPD type (H)       ASPIRIN EC PO      Take 81 mg by mouth daily        atorvastatin 80 MG tablet    LIPITOR    90 tablet    TAKE ONE TABLET (80MG) BY MOUTH EVERY EVENING INDICATION:TO LOWER CHOLESTEROL AND TO HELP REDUCE RISK OF REOCURRENCE OF HEAR ATTACK STROKE,A    PAD (peripheral artery disease) (H), Hyperlipidemia LDL goal <70       buPROPion 150 MG 24 hr tablet    WELLBUTRIN XL    30 tablet    Take 1 tablet (150 mg) by mouth every morning    Mild major depression (H)       Calcium  Carbonate-Vitamin D3 600-400 MG-UNIT Tabs    CALCIUM 600-D    100 tablet    Take 1 tablet by mouth 2 times daily (with meals)    Vitamin D deficiency       cholecalciferol 2000 UNITS Caps     100 capsule    Take 2 capsules by mouth daily FOR VITAMIN D DEFICIENCY (LOW VITAMIN D)    Vitamin D deficiency       clopidogrel 75 MG tablet    PLAVIX    90 tablet    TAKE ONE TABLET BY MOUTH ONE TIME DAILY    Peripheral vascular disease (H), History of TIA (transient ischemic attack) and stroke       denosumab 60 MG/ML Soln injection    PROLIA    1 Syringe    Inject 1 mL (60 mg) Subcutaneous every 6 months INDICATION: TO TREAT OSTEOPOROSIS    Osteoporosis       ezetimibe 10 MG tablet    ZETIA    90 tablet    TAKE 1 TABLET (10 MG) BY MOUTH DAILY INDICATION: TO LOWER CHOLESTEROL    PAD (peripheral artery disease) (H), Coronary artery disease involving native coronary artery of native heart without angina pectoris       fluticasone-vilanterol 200-25 MCG/INH oral inhaler    BREO ELLIPTA    1 Inhaler    Inhale 1 puff into the lungs daily INDICATION: COPD CONTROLLER    Chronic obstructive pulmonary disease, unspecified COPD type (H), Tobacco use disorder       lisinopril 40 MG tablet    PRINIVIL/ZESTRIL    90 tablet    Take 1 tablet (40 mg) by mouth daily INDICATION:TO LOWER BLOOD PRESSURE AND TO PRESERVE KIDNEY FUNCTION    PAD (peripheral artery disease) (H)       loratadine 10 MG tablet    CLARITIN    90 tablet    Take 1 tablet (10 mg) by mouth daily INDICATION: TO CONTROL ALLERGY SYMPTOMS    Chronic allergic rhinitis due to animal hair and dander       nicotine 21 MG/24HR 24 hr patch    NICODERM CQ    30 patch    Place 1 patch onto the skin every 24 hours    Mild major depression (H)       nitroGLYcerin 0.4 MG sublingual tablet    NITROSTAT    15 tablet    Place 1 tablet (0.4 mg) under the tongue See Admin Instructions for chest pain    Personal history of MI (myocardial infarction)       OMEGA-3 FISH OIL PO      Take 2 capsules  by mouth 2 times daily        omeprazole 20 MG CR capsule    priLOSEC    90 capsule    1 capsule daily as needed (not using daily)    Reflux esophagitis       TOPROL XL PO      Take 12.5 mg by mouth 2 times daily (0.5 x 25 mg tablet = 12.5 mg dose) INDICATION: TO LOWER BLOOD PRESSURE AND HELP PREVENT HEART DISEASE        TRAMADOL HCL PO      Take 50 mg by mouth daily as needed for moderate to severe pain        TYLENOL PO      Take 1,000-1,500 mg by mouth every 6 hours as needed for mild pain or fever

## 2017-11-07 NOTE — PATIENT INSTRUCTIONS
Change Metoprolol XL to 1 tab daily in AM rather than 1/2 tab twice a day  Fasting labs early/mid January 2018  Nicoderm 21mg patch  Daily. Stop cigs when using  Bupoprion XL 150mg tab, 1 tab daily in AM with food for depression and smoking  Referral to counselling. They will call to schedule or you may call  See me in 1 month for follow-up above issues and review other healthcare maintenance  FIT test.  Future colonoscopy when willing. Pt declines today  One multivtamin daily  Follow-up with Vascular clinic with imaging studies as scheduled

## 2017-11-08 RX ORDER — METOPROLOL SUCCINATE 25 MG/1
25 TABLET, EXTENDED RELEASE ORAL DAILY
Qty: 90 TABLET | Refills: 3 | Status: SHIPPED | OUTPATIENT
Start: 2017-11-08 | End: 2018-08-07

## 2017-12-07 ENCOUNTER — OFFICE VISIT (OUTPATIENT)
Dept: INTERNAL MEDICINE | Facility: CLINIC | Age: 57
End: 2017-12-07
Payer: COMMERCIAL

## 2017-12-07 VITALS
DIASTOLIC BLOOD PRESSURE: 78 MMHG | SYSTOLIC BLOOD PRESSURE: 126 MMHG | OXYGEN SATURATION: 99 % | TEMPERATURE: 98.9 F | HEART RATE: 56 BPM | BODY MASS INDEX: 24.15 KG/M2 | WEIGHT: 131 LBS

## 2017-12-07 DIAGNOSIS — F41.9 ANXIETY: ICD-10-CM

## 2017-12-07 DIAGNOSIS — F32.0 MILD MAJOR DEPRESSION (H): ICD-10-CM

## 2017-12-07 DIAGNOSIS — F17.200 TOBACCO USE DISORDER: ICD-10-CM

## 2017-12-07 DIAGNOSIS — R09.82 POST-NASAL DRAINAGE: Primary | ICD-10-CM

## 2017-12-07 DIAGNOSIS — R05.9 COUGH: ICD-10-CM

## 2017-12-07 PROCEDURE — 99214 OFFICE O/P EST MOD 30 MIN: CPT | Performed by: INTERNAL MEDICINE

## 2017-12-07 RX ORDER — FEXOFENADINE HCL 180 MG/1
180 TABLET ORAL DAILY
Qty: 30 TABLET | Refills: 1 | Status: SHIPPED | OUTPATIENT
Start: 2017-12-07 | End: 2018-08-07

## 2017-12-07 RX ORDER — BENZONATATE 200 MG/1
200 CAPSULE ORAL 3 TIMES DAILY PRN
Qty: 30 CAPSULE | Refills: 0 | Status: SHIPPED | OUTPATIENT
Start: 2017-12-07 | End: 2018-07-25

## 2017-12-07 ASSESSMENT — ANXIETY QUESTIONNAIRES
7. FEELING AFRAID AS IF SOMETHING AWFUL MIGHT HAPPEN: NOT AT ALL
2. NOT BEING ABLE TO STOP OR CONTROL WORRYING: NEARLY EVERY DAY
5. BEING SO RESTLESS THAT IT IS HARD TO SIT STILL: NOT AT ALL
GAD7 TOTAL SCORE: 12
1. FEELING NERVOUS, ANXIOUS, OR ON EDGE: NEARLY EVERY DAY
3. WORRYING TOO MUCH ABOUT DIFFERENT THINGS: NEARLY EVERY DAY
4. TROUBLE RELAXING: NOT AT ALL
7. FEELING AFRAID AS IF SOMETHING AWFUL MIGHT HAPPEN: NOT AT ALL
6. BECOMING EASILY ANNOYED OR IRRITABLE: NEARLY EVERY DAY
GAD7 TOTAL SCORE: 12
GAD7 TOTAL SCORE: 12

## 2017-12-07 NOTE — NURSING NOTE
"Chief Complaint   Patient presents with     Follow Up For     Medication Review per Dr. Benoit       Initial /78  Pulse 56  Temp 98.9  F (37.2  C) (Oral)  Wt 131 lb (59.4 kg)  SpO2 99%  BMI 24.15 kg/m2 Estimated body mass index is 24.15 kg/(m^2) as calculated from the following:    Height as of 9/27/17: 5' 1.75\" (1.568 m).    Weight as of this encounter: 131 lb (59.4 kg).  Medication Reconciliation: complete  "

## 2017-12-07 NOTE — PROGRESS NOTES
SUBJECTIVE:   Shirley Hendricks is a 57 year old female who presents to clinic today for the following health issues:      Chief Complaint   Patient presents with     Follow Up For     Medication Review per Dr. Benoit   Answers for HPI/ROS submitted by the patient on 12/7/2017   TYRON 7 TOTAL SCORE: 12    Pt's past medical history, family history, habits, medications and allergies were reviewed with the patient today.  See snap shot for  HCM status. Most recent lab results reviewed with pt. Problem list and histories reviewed & adjusted, as indicated.  Additional history as below:    Met pt 1 month ago for 1st time. Plan then as below:    Change Metoprolol XL to 1 tab daily in AM rather than 1/2 tab twice a day  Fasting labs early/mid January 2018  Nicoderm 21mg patch  Daily. Stop cigs when using  Bupoprion XL 150mg tab, 1 tab daily in AM with food for depression and smoking  Referral to counselling. They will call to schedule or you may call  See me in 1 month for follow-up above issues and review other healthcare maintenance  FIT test.  Future colonoscopy when willing. Pt declines today  One multivtamin daily  Follow-up with Vascular clinic with imaging studies as scheduled     Pt feeling quite anxious now. TYRON  = 12.  Last PHQ=8 in November. Still smoking 1/4 ppd. Planning on stopping 1/1/18 with  who also smokes  BP and HR well controlled  Vascular clinic appts Feb 2018  Never picked up Rx for Wellbutrin. Had to wait until eleazar arrived to get. Has used in  the past and felt good on the med without anxiety  Seeing therapist also once a week. Tomorrow with be 3rd session. Helpful so far  Has clear nasal drainage for the past 4-5 days and started taking Nyquil that helped dry things up   Has had some vomitting later part of the day from post nasal drainage when has a lot of coughing and blowing nose.  No emesis in the earlier day. No abd pain. Denies current nausea    Additional ROS:   Constitutional,  "HEENT, Cardiovascular, Pulmonary, GI and , Neuro, MSK and Psych review of systems/symptoms are otherwise negative or unchanged from previous, except as noted above.      OBJECTIVE:  /78  Pulse 56  Temp 98.9  F (37.2  C) (Oral)  Wt 131 lb (59.4 kg)  SpO2 99%  BMI 24.15 kg/m2   Estimated body mass index is 24.15 kg/(m^2) as calculated from the following:    Height as of 9/27/17: 5' 1.75\" (1.568 m).    Weight as of this encounter: 131 lb (59.4 kg).  Eye: PERRL, EOMI  HENT: ear canals and TM's normal and nose and mouth without ulcers or lesions.  Clear nasal discharge noted.  Sinuses nontender  Neck: no adenopathy. Thyroid normal to palpation. No bruits  Pulm: Lungs clear to auscultation currently.  No coughing during appointment today.  CV: Regular rates and rhythm  GI: Soft, nontender, Normal active bowel sounds, No hepatosplenomegaly or masses palpable  Ext: Peripheral pulses intact. No edema.  Neuro: Normal strength and tone, sensory exam grossly normal  Gen: Anxious affect    Assessment/Plan: (See plan discussion below for further details)  1. Anxiety  Patient to start bupropion as previously prescribed    2. Cough  Appears mostly due to postnasal drainage.  Cough suppressant as below  - benzonatate (TESSALON) 200 MG capsule; Take 1 capsule (200 mg) by mouth 3 times daily as needed for cough  Dispense: 30 capsule; Refill: 0    3. Tobacco use disorder  We will start bupropion as per plan below.  Prescription previously written    4. Mild major depression (H)  Patient to start bupropion as previously prescribed    5. Post-nasal drainage  - fexofenadine (ALLEGRA ALLERGY) 180 MG tablet; Take 1 tablet (180 mg) by mouth daily  Dispense: 30 tablet; Refill: 1    Plan discussion:   Fexofenadine/allegra 180mng tab, 1 tab dialy as needed for post nasal drainage if covered by insurance or cost OK, Other wise get OTC Loratidine (generic Claritin) 10mg daily as needed  Benzonatate 200mg every 8 hrs as needed for " cough suppression  If nausea/vomitting worsens, then contact clinic  Start Buproprion for mood and smoking   Quit ALL smoking as of 1/1/18 or earlier  Email update in [employer]FE  Early January re: mood and smoking or see me back in clinic  Fasting labs in January 2018       Vasquez Benoit MD  Internal Medicine Department  [facility]

## 2017-12-07 NOTE — MR AVS SNAPSHOT
"              After Visit Summary   12/7/2017    Shirley Hendricks    MRN: [id number]           Patient Information     Date Of Birth          1960        Visit Information        Provider Department      12/7/2017 10:00 AM Vasquez Benoit MD [facility]        Today's Diagnoses     Post-nasal drainage    -  1    Cough        Tobacco use disorder        Mild major depression (H)        Anxiety          Care Instructions     fexofenadine/allegra 180mng tab, 1 tab dialy as needed for post nasal drainage if covered by insurabce or cost OK, Other wise get OTC Loratidine (generic Claritin) 10mg daily as needed  Benzonatate 200mg every 8 hrs as needed for cough suppression  If nausea/vomitting worsens, then contact clinic  Start Buproprion for mood and smoking   Quit ALL smoking as of 1/1/18 or earlier  Email update in [employer]  Early January re\": smood and smoking ro see me back in clinic  Fasting labs in January 2018          Follow-ups after your visit        Your next 10 appointments already scheduled     Feb 15, 2018  9:45 AM CST   US LOWER EXTREMITY ARTERIAL DUPLEX LEFT with SHVUS2   [facility] Ultrasound ([facility])    [address]  [facility] [address]   [phone]           Please bring a list of your medicines (including vitamins, minerals and over-the-counter drugs). Also, tell your doctor about any allergies you may have. Wear comfortable clothes and leave your valuables at home.  You do not need to do anything special to prepare for your exam.  Please call the Imaging Department at your exam site with any questions.            Feb 15, 2018 10:30 AM CST   US AORTA/IVC/ILIAC DUPLEX COMPLETE with SHVUS2   [facility] Ultrasound ([facility])    [address]  [facility] [address]   [phone]           Please bring a list of your medicines (including " vitamins, minerals and over-the-counter drugs). Also, tell your doctor about any allergies you may have. Wear comfortable clothes and leave your valuables at home.  Adults: No eating or drinking for 8 hours before the exam. You may take medicine with a small sip of water.  Children: - Children 6+ years: No food or drink for 6 hours before exam. - Children 1-5 years: No food or drink for 4 hours before exam. - Infants, breast-fed: may have breast milk up to 2 hours before exam. - Infants, formula: may have bottle until 4 hours before exam.  Please call the Imaging Department at your exam site with any questions.            Feb 15, 2018 11:15 AM CST   US XAVIER DOPPLER WITH EXERCISE BILATERAL with SHVUS2   [facility] Ultrasound ([facility])    [address]  [facility] [address]   [phone]           Please bring a list of your medicines (including vitamins, minerals and over-the-counter drugs). Also, tell your doctor about any allergies you may have. Wear comfortable clothes and leave your valuables at home.  No caffeine or tobacco for 1 hour prior to exam.  Please call the Imaging Department at your exam site with any questions.            Feb 15, 2018 11:45 AM CST   Return Visit with Chadwick Lozano MD   [facility] Center ([facility])    [address]  [facility] [id number] [phone]              Who to contact     If you have questions or need follow up information about today's clinic visit or your schedule please contact [facility] directly at [phone].  Normal or non-critical lab and imaging results will be communicated to you by MyChart, letter or phone within 4 business days after the clinic has received the results. If you do not hear from us within 7 days, please contact the clinic through MyChart or phone. If you have a critical  or abnormal lab result, we will notify you by phone as soon as possible.  Submit refill requests through [employer] or call your pharmacy and they will forward the refill request to us. Please allow 3 business days for your refill to be completed.          Additional Information About Your Visit        [employer] Information     [employer] gives you secure access to your electronic health record. If you see a primary care provider, you can also send messages to your care team and make appointments. If you have questions, please call your primary care clinic.  If you do not have a primary care provider, please call [phone] and they will assist you.        Care EveryWhere ID     This is your Care EveryWhere ID. This could be used by other organizations to access your [facility] medical records  [id number]        Your Vitals Were     Pulse Temperature Pulse Oximetry BMI (Body Mass Index)          56 98.9  F (37.2  C) (Oral) 99% 24.15 kg/m2         Blood Pressure from Last 3 Encounters:   12/07/17 126/78   11/07/17 124/80   10/10/17 126/80    Weight from Last 3 Encounters:   12/07/17 131 lb (59.4 kg)   11/07/17 131 lb (59.4 kg)   09/27/17 130 lb 6.4 oz (59.1 kg)              We Performed the Following     DEPRESSION ACTION PLAN (DAP)          Today's Medication Changes          These changes are accurate as of: 12/7/17 10:39 AM.  If you have any questions, ask your nurse or doctor.               Start taking these medicines.        Dose/Directions    benzonatate 200 MG capsule   Commonly known as:  TESSALON   Used for:  Cough   Started by:  Vasquez Benoit MD        Dose:  200 mg   Take 1 capsule (200 mg) by mouth 3 times daily as needed for cough   Quantity:  30 capsule   Refills:  0       fexofenadine 180 MG tablet   Commonly known as:  ALLEGRA ALLERGY   Used for:  Post-nasal drainage   Started by:  Vasquez Benoit MD        Dose:  180 mg   Take 1 tablet (180 mg) by mouth daily   Quantity:  30 tablet   Refills:  1             Where to get your medicines      Some of these will need a paper prescription and others can be bought over the counter.  Ask your nurse if you have questions.     Bring a paper prescription for each of these medications     benzonatate 200 MG capsule    fexofenadine 180 MG tablet                Primary Care Provider Office Phone # Fax #    Vasquez Benoit [phone]       [address] [facility] [address]        Equal Access to Services     JAEL CRUMP : Hadii aad ku hadasho Soomaali, waaxda luqadaha, qaybta kaalmada adeegyada, waxay idiin hayaan adeeg josy labandar . So [facility] [phone].    ATENCIÓN: Si latia espxochilt, tiene a mahmood disposición servicios gratuitos de asistencia lingüística. Llame al [phone].    We comply with applicable federal civil rights laws and [place] laws. We do not discriminate on the basis of race, color, national origin, age, disability, sex, sexual orientation, or gender identity.            Thank you!     Thank you for choosing [facility]  for your care. Our goal is always to provide you with excellent care. Hearing back from our patients is one way we can continue to improve our services. Please take a few minutes to complete the written survey that you may receive in the mail after your visit with us. Thank you!             Your Updated Medication List - Protect others around you: Learn how to safely use, store and throw away your medicines at www.disposemymeds.org.          This list is accurate as of: 12/7/17 10:39 AM.  Always use your most recent med list.                   Brand Name Dispense Instructions for use Diagnosis    albuterol 108 (90 BASE) MCG/ACT Inhaler    PROAIR HFA/PROVENTIL HFA/VENTOLIN HFA    1 Inhaler    Inhale 2 puffs into the lungs every 6 hours as needed for shortness of breath / dyspnea or wheezing    Chronic obstructive pulmonary disease, unspecified COPD type (H)       ASPIRIN EC PO      Take 81 mg by mouth  daily        atorvastatin 80 MG tablet    LIPITOR    90 tablet    TAKE ONE TABLET (80MG) BY MOUTH EVERY EVENING INDICATION:TO LOWER CHOLESTEROL AND TO HELP REDUCE RISK OF REOCURRENCE OF HEAR ATTACK STROKE,A    PAD (peripheral artery disease) (H), Hyperlipidemia LDL goal <70       benzonatate 200 MG capsule    TESSALON    30 capsule    Take 1 capsule (200 mg) by mouth 3 times daily as needed for cough    Cough       buPROPion 150 MG 24 hr tablet    WELLBUTRIN XL    30 tablet    Take 1 tablet (150 mg) by mouth every morning    Mild major depression (H), Tobacco use disorder       Calcium Carbonate-Vitamin D3 600-400 MG-UNIT Tabs    CALCIUM 600-D    100 tablet    Take 1 tablet by mouth 2 times daily (with meals)    Vitamin D deficiency       cholecalciferol 2000 UNITS Caps     100 capsule    Take 2 capsules by mouth daily FOR VITAMIN D DEFICIENCY (LOW VITAMIN D)    Vitamin D deficiency       clopidogrel 75 MG tablet    PLAVIX    90 tablet    TAKE ONE TABLET BY MOUTH ONE TIME DAILY    Peripheral vascular disease (H), History of TIA (transient ischemic attack) and stroke       denosumab 60 MG/ML Soln injection    PROLIA    1 Syringe    Inject 1 mL (60 mg) Subcutaneous every 6 months INDICATION: TO TREAT OSTEOPOROSIS    Osteoporosis       ezetimibe 10 MG tablet    ZETIA    90 tablet    TAKE 1 TABLET (10 MG) BY MOUTH DAILY INDICATION: TO LOWER CHOLESTEROL    PAD (peripheral artery disease) (H), Coronary artery disease involving native coronary artery of native heart without angina pectoris       fexofenadine 180 MG tablet    ALLEGRA ALLERGY    30 tablet    Take 1 tablet (180 mg) by mouth daily    Post-nasal drainage       fluticasone-vilanterol 200-25 MCG/INH oral inhaler    BREO ELLIPTA    1 Inhaler    Inhale 1 puff into the lungs daily INDICATION: COPD CONTROLLER    Chronic obstructive pulmonary disease, unspecified COPD type (H), Tobacco use disorder       lisinopril 40 MG tablet    PRINIVIL/ZESTRIL    90 tablet    Take  1 tablet (40 mg) by mouth daily INDICATION:TO LOWER BLOOD PRESSURE AND TO PRESERVE KIDNEY FUNCTION    PAD (peripheral artery disease) (H)       loratadine 10 MG tablet    CLARITIN    90 tablet    Take 1 tablet (10 mg) by mouth daily INDICATION: TO CONTROL ALLERGY SYMPTOMS    Chronic allergic rhinitis due to animal hair and dander       metoprolol 25 MG 24 hr tablet    TOPROL-XL    90 tablet    Take 1 tablet (25 mg) by mouth daily    Essential hypertension       nicotine 21 MG/24HR 24 hr patch    NICODERM CQ    30 patch    Place 1 patch onto the skin every 24 hours    Tobacco use disorder       nitroGLYcerin 0.4 MG sublingual tablet    NITROSTAT    15 tablet    Place 1 tablet (0.4 mg) under the tongue See Admin Instructions for chest pain    Personal history of MI (myocardial infarction)       OMEGA-3 FISH OIL PO      Take 2 capsules by mouth 2 times daily        omeprazole 20 MG CR capsule    priLOSEC    90 capsule    1 capsule daily as needed (not using daily)    Reflux esophagitis       TRAMADOL HCL PO      Take 50 mg by mouth daily as needed for moderate to severe pain        TYLENOL PO      Take 1,000-1,500 mg by mouth every 6 hours as needed for mild pain or fever

## 2017-12-07 NOTE — PATIENT INSTRUCTIONS
Fexofenadine/allegra 180mng tab, 1 tab dialy as needed for post nasal drainage if covered by insurabce or cost OK, Other wise get OTC Loratidine (generic Claritin) 10mg daily as needed  Benzonatate 200mg every 8 hrs as needed for cough suppression  If nausea/vomitting worsens, then contact clinic  Start Buproprion for mood and smoking   Quit ALL smoking as of 1/1/18 or earlier  Email update in [employer]  Early January re: mood and smoking or see me back in clinic  Fasting labs in January 2018

## 2017-12-08 ASSESSMENT — ANXIETY QUESTIONNAIRES: GAD7 TOTAL SCORE: 12

## 2018-02-15 ENCOUNTER — HOSPITAL ENCOUNTER (OUTPATIENT)
Dept: ULTRASOUND IMAGING | Facility: CLINIC | Age: 58
End: 2018-02-15
Attending: SURGERY
Payer: COMMERCIAL

## 2018-02-15 ENCOUNTER — OFFICE VISIT (OUTPATIENT)
Dept: OTHER | Facility: CLINIC | Age: 58
End: 2018-02-15
Attending: SURGERY
Payer: COMMERCIAL

## 2018-02-15 ENCOUNTER — HOSPITAL ENCOUNTER (OUTPATIENT)
Dept: ULTRASOUND IMAGING | Facility: CLINIC | Age: 58
Discharge: HOME | End: 2018-02-15
Attending: SURGERY | Admitting: SURGERY
Payer: COMMERCIAL

## 2018-02-15 VITALS — DIASTOLIC BLOOD PRESSURE: 66 MMHG | HEART RATE: 59 BPM | SYSTOLIC BLOOD PRESSURE: 128 MMHG

## 2018-02-15 DIAGNOSIS — I73.9 PAD (PERIPHERAL ARTERY DISEASE) (H): ICD-10-CM

## 2018-02-15 DIAGNOSIS — I65.22 CAROTID STENOSIS, ASYMPTOMATIC, LEFT: ICD-10-CM

## 2018-02-15 DIAGNOSIS — I73.9 PAD (PERIPHERAL ARTERY DISEASE) (H): Primary | ICD-10-CM

## 2018-02-15 PROCEDURE — 93924 LWR XTR VASC STDY BILAT: CPT

## 2018-02-15 PROCEDURE — G0463 HOSPITAL OUTPT CLINIC VISIT: HCPCS

## 2018-02-15 PROCEDURE — 93978 VASCULAR STUDY: CPT

## 2018-02-15 PROCEDURE — 99214 OFFICE O/P EST MOD 30 MIN: CPT | Mod: ZP | Performed by: SURGERY

## 2018-02-15 PROCEDURE — 93926 LOWER EXTREMITY STUDY: CPT | Mod: LT

## 2018-02-15 NOTE — NURSING NOTE
"Chief Complaint   Patient presents with     RECHECK     6 month follow up, hx of left femoral popliteal bypass       Initial /66 (BP Location: Left arm, Patient Position: Chair, Cuff Size: Adult Large)  Pulse 59 Estimated body mass index is 24.15 kg/(m^2) as calculated from the following:    Height as of 9/27/17: 5' 1.75\" (1.568 m).    Weight as of 12/7/17: 131 lb (59.4 kg).  Medication Reconciliation: complete    Face to face time: 5 minutes    Kera Pak CMA    "

## 2018-02-15 NOTE — MR AVS SNAPSHOT
After Visit Summary   2/15/2018    Shirley Hendricks    MRN: [id number]           Patient Information     Date Of Birth          1960        Visit Information        Provider Department      2/15/2018 11:45 AM Chadwick Lozano MD [facility]      Today's Diagnoses     PAD (peripheral artery disease) (H)    -  1    Carotid stenosis, asymptomatic, left           Follow-ups after your visit        Follow-up notes from your care team     Return in about 6 months (around 8/15/2018).      Future tests that were ordered for you today     Open Future Orders        Priority Expected Expires Ordered    US Aorta/Ivc/Iliac Duplex Complete Routine 2/1/2018 8/14/2018 8/14/2017            Who to contact     If you have questions or need follow up information about today's clinic visit or your schedule please contact [facility] directly at [phone].  Normal or non-critical lab and imaging results will be communicated to you by MyChart, letter or phone within 4 business days after the clinic has received the results. If you do not hear from us within 7 days, please contact the clinic through [employer] or phone. If you have a critical or abnormal lab result, we will notify you by phone as soon as possible.  Submit refill requests through [employer] or call your pharmacy and they will forward the refill request to us. Please allow 3 business days for your refill to be completed.          Additional Information About Your Visit        MyChart Information     [employer] gives you secure access to your electronic health record. If you see a primary care provider, you can also send messages to your care team and make appointments. If you have questions, please call your primary care clinic.  If you do not have a primary care provider, please call [phone] and they will assist you.        Care EveryWhere ID     This is  your Care EveryWhere ID. This could be used by other organizations to access your [facility] medical records  [id number]        Your Vitals Were     Pulse                   59            Blood Pressure from Last 3 Encounters:   02/15/18 128/66   12/07/17 126/78   11/07/17 124/80    Weight from Last 3 Encounters:   12/07/17 131 lb (59.4 kg)   11/07/17 131 lb (59.4 kg)   09/27/17 130 lb 6.4 oz (59.1 kg)              Today, you had the following     No orders found for display       Primary Care Provider Office Phone # Fax #    Vasquez Benoit [phone]       [address] [place] [address]        Equal Access to Services     JAEL CRUMP : Thai chávezo Sosharri, waaxda luqadaha, qaybta kaalmada adeegyada, alesia dowd . So [facility] [phone].    ATENCIÓN: Si habla español, tiene a mahmood disposición servicios gratuitos de asistencia lingüística. [facility] [phone].    We comply with applicable federal civil rights laws and [place] laws. We do not discriminate on the basis of race, color, national origin, age, disability, sex, sexual orientation, or gender identity.            Thank you!     Thank you for choosing [facility] [place]  for your care. Our goal is always to provide you with excellent care. Hearing back from our patients is one way we can continue to improve our services. Please take a few minutes to complete the written survey that you may receive in the mail after your visit with us. Thank you!             Your Updated Medication List - Protect others around you: Learn how to safely use, store and throw away your medicines at www.disposemymeds.org.          This list is accurate as of 2/15/18 12:55 PM.  Always use your most recent med list.                   Brand Name Dispense Instructions for use Diagnosis    albuterol 108 (90 BASE) MCG/ACT Inhaler    PROAIR HFA/PROVENTIL HFA/VENTOLIN HFA    1 Inhaler    Inhale 2 puffs into the  lungs every 6 hours as needed for shortness of breath / dyspnea or wheezing    Chronic obstructive pulmonary disease, unspecified COPD type (H)       ASPIRIN EC PO      Take 81 mg by mouth daily        atorvastatin 80 MG tablet    LIPITOR    90 tablet    TAKE ONE TABLET (80MG) BY MOUTH EVERY EVENING INDICATION:TO LOWER CHOLESTEROL AND TO HELP REDUCE RISK OF REOCURRENCE OF HEAR ATTACK STROKE,A    PAD (peripheral artery disease) (H), Hyperlipidemia LDL goal <70       benzonatate 200 MG capsule    TESSALON    30 capsule    Take 1 capsule (200 mg) by mouth 3 times daily as needed for cough    Cough       buPROPion 150 MG 24 hr tablet    WELLBUTRIN XL    30 tablet    Take 1 tablet (150 mg) by mouth every morning    Mild major depression (H), Tobacco use disorder       Calcium Carbonate-Vitamin D3 600-400 MG-UNIT Tabs    CALCIUM 600-D    100 tablet    Take 1 tablet by mouth 2 times daily (with meals)    Vitamin D deficiency       cholecalciferol 2000 UNITS Caps     100 capsule    Take 2 capsules by mouth daily FOR VITAMIN D DEFICIENCY (LOW VITAMIN D)    Vitamin D deficiency       clopidogrel 75 MG tablet    PLAVIX    90 tablet    TAKE ONE TABLET BY MOUTH ONE TIME DAILY    Peripheral vascular disease (H), History of TIA (transient ischemic attack) and stroke       denosumab 60 MG/ML Soln injection    PROLIA    1 Syringe    Inject 1 mL (60 mg) Subcutaneous every 6 months INDICATION: TO TREAT OSTEOPOROSIS    Osteoporosis       ezetimibe 10 MG tablet    ZETIA    90 tablet    TAKE 1 TABLET (10 MG) BY MOUTH DAILY INDICATION: TO LOWER CHOLESTEROL    PAD (peripheral artery disease) (H), Coronary artery disease involving native coronary artery of native heart without angina pectoris       fexofenadine 180 MG tablet    ALLEGRA ALLERGY    30 tablet    Take 1 tablet (180 mg) by mouth daily    Post-nasal drainage       fluticasone-vilanterol 200-25 MCG/INH oral inhaler    BREO ELLIPTA    1 Inhaler    Inhale 1 puff into the lungs daily  INDICATION: COPD CONTROLLER    Chronic obstructive pulmonary disease, unspecified COPD type (H), Tobacco use disorder       lisinopril 40 MG tablet    PRINIVIL/ZESTRIL    90 tablet    Take 1 tablet (40 mg) by mouth daily INDICATION:TO LOWER BLOOD PRESSURE AND TO PRESERVE KIDNEY FUNCTION    PAD (peripheral artery disease) (H)       loratadine 10 MG tablet    CLARITIN    90 tablet    Take 1 tablet (10 mg) by mouth daily INDICATION: TO CONTROL ALLERGY SYMPTOMS    Chronic allergic rhinitis due to animal hair and dander       metoprolol succinate 25 MG 24 hr tablet    TOPROL-XL    90 tablet    Take 1 tablet (25 mg) by mouth daily    Essential hypertension       nicotine 21 MG/24HR 24 hr patch    NICODERM CQ    30 patch    Place 1 patch onto the skin every 24 hours    Tobacco use disorder       nitroGLYcerin 0.4 MG sublingual tablet    NITROSTAT    15 tablet    Place 1 tablet (0.4 mg) under the tongue See Admin Instructions for chest pain    Personal history of MI (myocardial infarction)       OMEGA-3 FISH OIL PO      Take 2 capsules by mouth 2 times daily        omeprazole 20 MG CR capsule    priLOSEC    90 capsule    1 capsule daily as needed (not using daily)    Reflux esophagitis       TRAMADOL HCL PO      Take 50 mg by mouth daily as needed for moderate to severe pain        TYLENOL PO      Take 1,000-1,500 mg by mouth every 6 hours as needed for mild pain or fever

## 2018-02-15 NOTE — PROGRESS NOTES
[place] [facility]    Shirley Hendricks returns to see me today for follow-up of her vascular problems with her .    She underwent an aortobifemoral bypass graft on 3/17/2010.  A left femoral to above-knee popliteal bypass graft was performed during that admission and has been working well.  She has no occlusion of the right superficial femoral artery related to an angiogram evaluating her carotid disease.  Despite this she remains relatively asymptomatic with no disabling claudication or rest pain in the right leg.    She underwent a right CEA for a critical stenosis on 5/11/2016.  Evaluation on 8/10/2017 revealed mild restenosis of the CEA but no significant narrowing.  There is a moderately severe asymptomatic left carotid stenosis is scheduled for follow-up study in August 2018.    PMH: Medications: Toprol-XL, Prilosec, Lipitor, Plavix, Zetia, lisinopril            Last epic recorded LDL January 2017= 74            Still unfortunately continues to smoke --aware that she should quit completely      Lives with her .  Works as a      Exam: Alert and appropriate.  Very comfortable.. Normal affect.                Blood pressure 128/66.  Pulse 59.                  Well-healed right carotid incision.                 Chest= clear   Cardiovascular=RR                 Well-healed abdominal and groin incisions.                 +3 left graft pulse.        Duplex today reveals a widely patent left femoral to popliteal in situ bypass graft with good flow in diameter.    Duplex reveals a widely patent aortobifemoral bypass graft with good flow.    Ankle-brachial index is normal the left of 1.03 with triphasic or biphasic signal.  Slight decreased with exercise/0.92 but very adequate with bypass graft.    On the right this is diminished but stable at 0.54 with monophasic waveforms and decreases to 0.42 with exercise.  Slightly decreased from last year but she is asymptomatic.  This is  consistent with her known SFA occlusion.                  Impression: #1  Patent aortobifemoral bypass graft.  Known relatively asymptomatic right SFA occlusion.  Repeat XAVIER 1 year.                           #2   Patent left femoral-popliteal bypass graft with good distal flow.  Duplex in 1 year.                            #3   Status post right CEA with known moderate asymptomatic left carotid stenosis.  Duplex to be repeated August 2018.                              #4   Ongoing tobacco abuse.  Knows that she should quit smoking.    Chadwick Lozano MD     Please route or send letter to:  Primary Care Provider (PCP)

## 2018-02-15 NOTE — LETTER
[facility]  [address]  [place] [address]  Phone: [phone]  Fax: [phone]    February 15, 2018    Re: Shirley Hendricks, : 1960    [facility]     Shirley Hendricks returns to see me today for follow-up of her vascular problems with her .     She underwent an aortobifemoral bypass graft on 3/17/2010.  A left femoral to above-knee popliteal bypass graft was performed during that admission and has been working well.  She has no occlusion of the right superficial femoral artery related to an angiogram evaluating her carotid disease.  Despite this she remains relatively asymptomatic with no disabling claudication or rest pain in the right leg.     She underwent a right CEA for a critical stenosis on 2016.  Evaluation on 8/10/2017 revealed mild restenosis of the CEA but no significant narrowing.  There is a moderately severe asymptomatic left carotid stenosis is scheduled for follow-up study in 2018.     PMH: Medications: Toprol-XL, Prilosec, Lipitor, Plavix, Zetia, lisinopril            Last epic recorded LDL 2017= 74            Still unfortunately continues to smoke --aware that she should quit completely     Lives with her .  Works as a       Exam: Alert and appropriate.  Very comfortable.. Normal affect.                Blood pressure 128/66.  Pulse 59.                  Well-healed right carotid incision.                 Chest= clear   Cardiovascular=RR                 Well-healed abdominal and groin incisions.                 +3 left graft pulse.     Duplex today reveals a widely patent left femoral to popliteal in situ bypass graft with good flow in diameter.     Duplex reveals a widely patent aortobifemoral bypass graft with good flow.     Ankle-brachial index is normal the left of 1.03 with triphasic or biphasic signal.  Slight decreased with exercise/0.92 but very adequate with  bypass graft.     On the right this is diminished but stable at 0.54 with monophasic waveforms and decreases to 0.42 with exercise.  Slightly decreased from last year but she is asymptomatic.  This is consistent with her known SFA occlusion.     Impression:       #1  Patent aortobifemoral bypass graft.  Known relatively asymptomatic right SFA occlusion.  Repeat XAVIER 1 year.                            #2   Patent left femoral-popliteal bypass graft with good distal flow.  Duplex in 1 year.                             #3   Status post right CEA with known moderate asymptomatic left carotid stenosis.  Duplex to be repeated August 2018.                                #4   Ongoing tobacco abuse.  Knows that she should quit smoking.     Chadwick Lozano MD

## 2018-02-16 DIAGNOSIS — I65.21 CAROTID STENOSIS, RIGHT: Primary | ICD-10-CM

## 2018-02-23 DIAGNOSIS — I65.23 BILATERAL CAROTID ARTERY STENOSIS: Primary | ICD-10-CM

## 2018-03-16 NOTE — TELEPHONE ENCOUNTER
Requested Prescriptions   Pending Prescriptions Disp Refills     traMADol (ULTRAM) 50 MG tablet 20 tablet 0     Sig: Take 1 tablet (50 mg) by mouth daily as needed    There is no refill protocol information for this order        Last Written Prescription Date:  09/07/2017  Last Fill Quantity: historical,  # refills: historical   Last office visit: 12/7/2017 with prescribing provider:  11/07/17   Future Office Visit:

## 2018-03-23 RX ORDER — TRAMADOL HYDROCHLORIDE 50 MG/1
50 TABLET ORAL DAILY PRN
Qty: 20 TABLET | Refills: 0 | OUTPATIENT
Start: 2018-03-23

## 2018-03-24 NOTE — TELEPHONE ENCOUNTER
Have met pt twice with last appt 3 mos ago.  Narcotics  Not good choice for longterm pain control, especially as don't see that alternatives (physical therapy, non-narcotic pain meds like Gabapentin,  Cymbalta, etc have been tried). Tramadol RF declined at this time. Pt to see me back in clinic to discuss pain issues further and alternative treatment options besides narcotics. Inform pt and pharmacy

## 2018-03-26 NOTE — TELEPHONE ENCOUNTER
Called Shirley and gave her the message.  Scheduled a Return OV with Dr. Benoit on 4/16/18 at 10:00 a.m    Called [facility] and notified them.     BOUBACAR Lakhani ([facility])

## 2018-07-13 ENCOUNTER — OFFICE VISIT (OUTPATIENT)
Dept: URGENT CARE | Facility: URGENT CARE | Age: 58
End: 2018-07-13
Payer: COMMERCIAL

## 2018-07-13 VITALS
TEMPERATURE: 98.1 F | OXYGEN SATURATION: 98 % | SYSTOLIC BLOOD PRESSURE: 140 MMHG | WEIGHT: 127.8 LBS | RESPIRATION RATE: 16 BRPM | HEART RATE: 52 BPM | BODY MASS INDEX: 23.56 KG/M2 | DIASTOLIC BLOOD PRESSURE: 90 MMHG

## 2018-07-13 DIAGNOSIS — J44.1 COPD EXACERBATION (H): Primary | ICD-10-CM

## 2018-07-13 PROCEDURE — 99214 OFFICE O/P EST MOD 30 MIN: CPT | Performed by: FAMILY MEDICINE

## 2018-07-13 RX ORDER — BENZONATATE 200 MG/1
200 CAPSULE ORAL 3 TIMES DAILY PRN
Qty: 21 CAPSULE | Refills: 0 | Status: SHIPPED | OUTPATIENT
Start: 2018-07-13 | End: 2018-07-25

## 2018-07-13 RX ORDER — DOXYCYCLINE 100 MG/1
100 CAPSULE ORAL 2 TIMES DAILY
Qty: 20 CAPSULE | Refills: 0 | Status: SHIPPED | OUTPATIENT
Start: 2018-07-13 | End: 2019-02-01

## 2018-07-13 RX ORDER — ALBUTEROL SULFATE 90 UG/1
1-2 AEROSOL, METERED RESPIRATORY (INHALATION) EVERY 4 HOURS PRN
Qty: 1 INHALER | Refills: 0 | Status: SHIPPED | OUTPATIENT
Start: 2018-07-13 | End: 2019-09-24

## 2018-07-13 NOTE — PATIENT INSTRUCTIONS
Chronic Lung Disease: Preventing Lung Infections  Chronic lung diseases include chronic obstructive pulmonary disease (COPD), which includes chronic bronchitis and emphysema. Other chronic lung diseases include pulmonary fibrosis, sarcoidosis, and other conditions. When you have chronic lung diseases, it's very important to protect yourself from respiratory infections, like colds, the flu, and lung infections. Infections may cause your lung condition to worsen. Although you can't completely avoid them, there are things you can do to lessen the chance of infections.    Take precautions  Taking the following precautions can help you avoid illness:    Remember to keep your hands away from your nose and mouth. Germs on your hands get into your respiratory system this way.    Wash your hands often. When you wash them:  ? Use soap and warm water.  ? Rub your hands together well for at least 20 seconds.  ? Make sure to rinse them well.  ? Dry your hands on clean towels or air-dry them.    Use hand  containing alcohol, if you are unable to wash your hands. Use the  after touching doorknobs, handles, and supermarket carts, for example, since lots of people touch them. Then wash your hands as soon as you can.    To help prevent the flu, get a flu vaccination every year. This may be given at your healthcare provider's office, a drugstore, or pharmacy, or at work. Get your flu shot as soon as the vaccines are available in your area. This is usually around September each year.    To help prevent pneumococcal pneumonia, get pneumonia vaccinations. Talk with your healthcare provider about which pneumococcal vaccinations you need.    Try to stay away from people with respiratory infections, such as colds or the flu. Stay away from crowded places, like shopping centers or movie theatres during cold and flu season.    If you smoke, think about quitting. In addition to causing or worsening many lung conditions, the  lung damage from smoking increases your risk of infections. Stay away from others who smoke, too. This is also harmful and increases your chance of infections.  Date Last Reviewed: 4/14/2016 2000-2017 [employer]. [address] [place] [address], [place] [address]. All rights reserved. This information is not intended as a substitute for professional medical care. Always follow your healthcare professional's instructions.

## 2018-07-13 NOTE — MR AVS SNAPSHOT
After Visit Summary   7/13/2018    hSirley Hendricks    MRN: [id number]           Patient Information     Date Of Birth          1960        Visit Information        Provider Department      7/13/2018 9:25 AM Elvia Campbell MD [facility]        Today's Diagnoses     COPD exacerbation (H)    -  1      Care Instructions      Chronic Lung Disease: Preventing Lung Infections  Chronic lung diseases include chronic obstructive pulmonary disease (COPD), which includes chronic bronchitis and emphysema. Other chronic lung diseases include pulmonary fibrosis, sarcoidosis, and other conditions. When you have chronic lung diseases, it's very important to protect yourself from respiratory infections, like colds, the flu, and lung infections. Infections may cause your lung condition to worsen. Although you can't completely avoid them, there are things you can do to lessen the chance of infections.    Take precautions  Taking the following precautions can help you avoid illness:    Remember to keep your hands away from your nose and mouth. Germs on your hands get into your respiratory system this way.    Wash your hands often. When you wash them:  ? Use soap and warm water.  ? Rub your hands together well for at least 20 seconds.  ? Make sure to rinse them well.  ? Dry your hands on clean towels or air-dry them.    Use hand  containing alcohol, if you are unable to wash your hands. Use the  after touching doorknobs, handles, and supermarket carts, for example, since lots of people touch them. Then wash your hands as soon as you can.    To help prevent the flu, get a flu vaccination every year. This may be given at your healthcare provider's office, a drugstore, or pharmacy, or at work. Get your flu shot as soon as the vaccines are available in your area. This is usually around September each year.    To help prevent pneumococcal pneumonia, get pneumonia  vaccinations. Talk with your healthcare provider about which pneumococcal vaccinations you need.    Try to stay away from people with respiratory infections, such as colds or the flu. Stay away from crowded places, like shopping centers or movie theatres during cold and flu season.    If you smoke, think about quitting. In addition to causing or worsening many lung conditions, the lung damage from smoking increases your risk of infections. Stay away from others who smoke, too. This is also harmful and increases your chance of infections.  Date Last Reviewed: 4/14/2016 2000-2017 [employer]. [address] [place] [address]. All rights reserved. This information is not intended as a substitute for professional medical care. Always follow your healthcare professional's instructions.                Follow-ups after your visit        Your next 10 appointments already scheduled     Aug 23, 2018  9:30 AM CDT   US CAROTID BILATERAL with SHVUS1   [facility] ([facility])    [address]  [place] [address]   [phone]           Please bring a list of your medicines (including vitamins, minerals and over-the-counter drugs). Also, tell your doctor about any allergies you may have. Wear comfortable clothes and leave your valuables at home.  You do not need to do anything special to prepare for your exam.  Please call the Imaging Department at your exam site with any questions.            Aug 23, 2018 10:30 AM CDT   Return Visit with Chadwick Lozano MD   [facility] ([facility])    [address]  [place] [address] [phone]              Who to contact     If you have questions or need follow up information about today's clinic visit or your schedule please contact [facility] directly at  [phone].  Normal or non-critical lab and imaging results will be communicated to you by Accur[employer]Chart, letter or phone within 4 business days after the clinic has received the results. If you do not hear from us within 7 days, please contact the clinic through [employer] or phone. If you have a critical or abnormal lab result, we will notify you by phone as soon as possible.  Submit refill requests through [employer] or call your pharmacy and they will forward the refill request to us. Please allow 3 business days for your refill to be completed.          Additional Information About Your Visit        AccurIChart Information     [employer] gives you secure access to your electronic health record. If you see a primary care provider, you can also send messages to your care team and make appointments. If you have questions, please call your primary care clinic.  If you do not have a primary care provider, please call [phone] and they will assist you.        Care EveryWhere ID     This is your Care EveryWhere ID. This could be used by other organizations to access your Mobile medical records  [id number]        Your Vitals Were     Pulse Temperature Respirations Pulse Oximetry BMI (Body Mass Index)       52 98.1  F (36.7  C) (Oral) 16 98% 23.56 kg/m2        Blood Pressure from Last 3 Encounters:   07/13/18 140/90   02/15/18 128/66   12/07/17 126/78    Weight from Last 3 Encounters:   07/13/18 127 lb 12.8 oz (58 kg)   12/07/17 131 lb (59.4 kg)   11/07/17 131 lb (59.4 kg)              Today, you had the following     No orders found for display         Today's Medication Changes          These changes are accurate as of 7/13/18  9:46 AM.  If you have any questions, ask your nurse or doctor.               Start taking these medicines.        Dose/Directions    doxycycline 100 MG capsule   Commonly known as:  VIBRAMYCIN   Used for:  COPD exacerbation (H)   Started by:  Elvia Campbell MD        Dose:  100 mg   Take 1 capsule  (100 mg) by mouth 2 times daily for 10 days   Quantity:  20 capsule   Refills:  0         These medicines have changed or have updated prescriptions.        Dose/Directions    * albuterol 108 (90 Base) MCG/ACT Inhaler   Commonly known as:  PROAIR HFA/PROVENTIL HFA/VENTOLIN HFA   This may have changed:  Another medication with the same name was added. Make sure you understand how and when to take each.   Used for:  Chronic obstructive pulmonary disease, unspecified COPD type (H)   Changed by:  Elvia Campbell MD        Dose:  2 puff   Inhale 2 puffs into the lungs every 6 hours as needed for shortness of breath / dyspnea or wheezing   Quantity:  1 Inhaler   Refills:  prn       * albuterol 108 (90 Base) MCG/ACT Inhaler   Commonly known as:  PROAIR HFA/PROVENTIL HFA/VENTOLIN HFA   This may have changed:  You were already taking a medication with the same name, and this prescription was added. Make sure you understand how and when to take each.   Used for:  COPD exacerbation (H)   Changed by:  Elvia Campbell MD        Dose:  1-2 puff   Inhale 1-2 puffs into the lungs every 4 hours as needed for shortness of breath / dyspnea or wheezing   Quantity:  1 Inhaler   Refills:  0       * benzonatate 200 MG capsule   Commonly known as:  TESSALON   This may have changed:  Another medication with the same name was added. Make sure you understand how and when to take each.   Used for:  Cough   Changed by:  Elvia Campbell MD        Dose:  200 mg   Take 1 capsule (200 mg) by mouth 3 times daily as needed for cough   Quantity:  30 capsule   Refills:  0       * benzonatate 200 MG capsule   Commonly known as:  TESSALON   This may have changed:  You were already taking a medication with the same name, and this prescription was added. Make sure you understand how and when to take each.   Used for:  COPD exacerbation (H)   Changed by:  Elvia Campbell MD        Dose:  200 mg   Take 1 capsule (200 mg) by mouth 3 times  daily as needed for cough   Quantity:  21 capsule   Refills:  0       * Notice:  This list has 4 medication(s) that are the same as other medications prescribed for you. Read the directions carefully, and ask your doctor or other care provider to review them with you.         Where to get your medicines      These medications were sent to [id number] - [place], [place] - [address] [facility]  [address] [facility] [address]     Phone:  [phone]     albuterol 108 (90 Base) MCG/ACT Inhaler    benzonatate 200 MG capsule    doxycycline 100 MG capsule                Primary Care Provider Office Phone # Fax #    Vasquez Benoit -[phone]       [address] [facility] [address]        Equal Access to Services     JAEL CRUMP : Hadii aad ku hadasho Soomaali, waaxda luqadaha, qaybta kaalmada adeegyada, alesia cerda. So [facility] [phone].    ATENCIÓN: Si habla español, tiene a mahmood disposición servicios gratuitos de asistencia lingüística. Llame al [phone].    We comply with applicable federal civil rights laws and [place] laws. We do not discriminate on the basis of race, color, national origin, age, disability, sex, sexual orientation, or gender identity.            Thank you!     Thank you for choosing [facility] [place]  for your care. Our goal is always to provide you with excellent care. Hearing back from our patients is one way we can continue to improve our services. Please take a few minutes to complete the written survey that you may receive in the mail after your visit with us. Thank you!             Your Updated Medication List - Protect others around you: Learn how to safely use, store and throw away your medicines at www.disposemymeds.org.          This list is accurate as of 7/13/18  9:46 AM.  Always use your most recent med list.                   Brand Name Dispense Instructions for use Diagnosis    *  albuterol 108 (90 Base) MCG/ACT Inhaler    PROAIR HFA/PROVENTIL HFA/VENTOLIN HFA    1 Inhaler    Inhale 2 puffs into the lungs every 6 hours as needed for shortness of breath / dyspnea or wheezing    Chronic obstructive pulmonary disease, unspecified COPD type (H)       * albuterol 108 (90 Base) MCG/ACT Inhaler    PROAIR HFA/PROVENTIL HFA/VENTOLIN HFA    1 Inhaler    Inhale 1-2 puffs into the lungs every 4 hours as needed for shortness of breath / dyspnea or wheezing    COPD exacerbation (H)       ASPIRIN EC PO      Take 81 mg by mouth daily        atorvastatin 80 MG tablet    LIPITOR    90 tablet    TAKE ONE TABLET (80MG) BY MOUTH EVERY EVENING INDICATION:TO LOWER CHOLESTEROL AND TO HELP REDUCE RISK OF REOCURRENCE OF HEAR ATTACK STROKE,A    PAD (peripheral artery disease) (H), Hyperlipidemia LDL goal <70       * benzonatate 200 MG capsule    TESSALON    30 capsule    Take 1 capsule (200 mg) by mouth 3 times daily as needed for cough    Cough       * benzonatate 200 MG capsule    TESSALON    21 capsule    Take 1 capsule (200 mg) by mouth 3 times daily as needed for cough    COPD exacerbation (H)       buPROPion 150 MG 24 hr tablet    WELLBUTRIN XL    30 tablet    Take 1 tablet (150 mg) by mouth every morning    Mild major depression (H), Tobacco use disorder       Calcium Carbonate-Vitamin D3 600-400 MG-UNIT Tabs    CALCIUM 600-D    100 tablet    Take 1 tablet by mouth 2 times daily (with meals)    Vitamin D deficiency       cholecalciferol 2000 units Caps     100 capsule    Take 2 capsules by mouth daily FOR VITAMIN D DEFICIENCY (LOW VITAMIN D)    Vitamin D deficiency       clopidogrel 75 MG tablet    PLAVIX    90 tablet    TAKE ONE TABLET BY MOUTH ONE TIME DAILY    Peripheral vascular disease (H), History of TIA (transient ischemic attack) and stroke       denosumab 60 MG/ML Soln injection    PROLIA    1 Syringe    Inject 1 mL (60 mg) Subcutaneous every 6 months INDICATION: TO TREAT OSTEOPOROSIS    Osteoporosis        doxycycline 100 MG capsule    VIBRAMYCIN    20 capsule    Take 1 capsule (100 mg) by mouth 2 times daily for 10 days    COPD exacerbation (H)       ezetimibe 10 MG tablet    ZETIA    90 tablet    TAKE 1 TABLET (10 MG) BY MOUTH DAILY INDICATION: TO LOWER CHOLESTEROL    PAD (peripheral artery disease) (H), Coronary artery disease involving native coronary artery of native heart without angina pectoris       fexofenadine 180 MG tablet    ALLEGRA ALLERGY    30 tablet    Take 1 tablet (180 mg) by mouth daily    Post-nasal drainage       fluticasone-vilanterol 200-25 MCG/INH oral inhaler    BREO ELLIPTA    1 Inhaler    Inhale 1 puff into the lungs daily INDICATION: COPD CONTROLLER    Chronic obstructive pulmonary disease, unspecified COPD type (H), Tobacco use disorder       lisinopril 40 MG tablet    PRINIVIL/ZESTRIL    90 tablet    Take 1 tablet (40 mg) by mouth daily INDICATION:TO LOWER BLOOD PRESSURE AND TO PRESERVE KIDNEY FUNCTION    PAD (peripheral artery disease) (H)       loratadine 10 MG tablet    CLARITIN    90 tablet    Take 1 tablet (10 mg) by mouth daily INDICATION: TO CONTROL ALLERGY SYMPTOMS    Chronic allergic rhinitis due to animal hair and dander       metoprolol succinate 25 MG 24 hr tablet    TOPROL-XL    90 tablet    Take 1 tablet (25 mg) by mouth daily    Essential hypertension       nicotine 21 MG/24HR 24 hr patch    NICODERM CQ    30 patch    Place 1 patch onto the skin every 24 hours    Tobacco use disorder       nitroGLYcerin 0.4 MG sublingual tablet    NITROSTAT    15 tablet    Place 1 tablet (0.4 mg) under the tongue See Admin Instructions for chest pain    Personal history of MI (myocardial infarction)       OMEGA-3 FISH OIL PO      Take 2 capsules by mouth 2 times daily        omeprazole 20 MG CR capsule    priLOSEC    90 capsule    1 capsule daily as needed (not using daily)    Reflux esophagitis       TRAMADOL HCL PO      Take 50 mg by mouth daily as needed for moderate to severe pain         TYLENOL PO      Take 1,000-1,500 mg by mouth every 6 hours as needed for mild pain or fever        * Notice:  This list has 4 medication(s) that are the same as other medications prescribed for you. Read the directions carefully, and ask your doctor or other care provider to review them with you.

## 2018-07-13 NOTE — PROGRESS NOTES
Chief Complaint   Patient presents with     Urgent Care     URI     Pt states chest congestion, vomiting with cough, not sleeping  sxs 1x week      Shirley Hendricks is a 57 year old female who presents for shortness of breath, congested cough with wheezing that started 5 days ago.     A previous diagnosis of COPD was made concerning this patient on the basis of   symptoms and response to medications.   Current symptoms include wheezing, productive cough with sputum described as yellow, dyspnea on exertion, chest tightness and chest pain.   Precipitating factors are uri infections and exercise.      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 clot     Post-menopausal      Reflux esophagitis 2/04    EGD: esophagitis and medium HH     Uncomplicated asthma      Patient Active Problem List   Diagnosis     Reflux esophagitis     Health Care Home     Osteoporosis     Cervicalgia     CAD (coronary artery disease)     PVD (peripheral vascular disease) (H)     Hyperlipidemia LDL goal <70     PAD  (peripheral artery disease) (H)     Essential hypertension     Vitamin D deficiency     Neuropathy     Coronary artery disease involving native coronary artery of native heart without angina pectoris     Primary osteoarthritis involving multiple joints     DDD (degenerative disc disease), lumbar     Chronic pain syndrome     Stroke (H)     Bilateral carotid artery stenosis     S/P carotid endarterectomy     Chronic allergic rhinitis due to animal hair and dander     Tobacco use disorder     Chronic obstructive pulmonary disease, unspecified COPD type (H)     Mild major depression (H)     Anxiety       ALLERGIES:  Contrast dye; Pantoprazole; and Penicillins      Current Outpatient Prescriptions on File Prior to Visit:  Acetaminophen (TYLENOL PO) Take 1,000-1,500 mg by mouth every 6 hours as needed for mild pain or fever    albuterol (PROAIR HFA/PROVENTIL HFA/VENTOLIN HFA) 108 (90 BASE) MCG/ACT Inhaler Inhale 2 puffs into the lungs every 6 hours as needed for shortness of breath / dyspnea or wheezing   ASPIRIN EC PO Take 81 mg by mouth daily   atorvastatin (LIPITOR) 80 MG tablet TAKE ONE TABLET (80MG) BY MOUTH EVERY EVENING INDICATION:TO LOWER CHOLESTEROL AND TO HELP REDUCE RISK OF REOCURRENCE OF HEAR ATTACK STROKE,A   benzonatate (TESSALON) 200 MG capsule Take 1 capsule (200 mg) by mouth 3 times daily as needed for cough   buPROPion (WELLBUTRIN XL) 150 MG 24 hr tablet Take 1 tablet (150 mg) by mouth every morning   Calcium Carbonate-Vitamin D3 (CALCIUM 600-D) 600-400 MG-UNIT TABS Take 1 tablet by mouth 2 times daily (with meals)   cholecalciferol 2000 UNITS CAPS Take 2 capsules by mouth daily FOR VITAMIN D DEFICIENCY (LOW VITAMIN D)   clopidogrel (PLAVIX) 75 MG tablet TAKE ONE TABLET BY MOUTH ONE TIME DAILY    denosumab (PROLIA) 60 MG/ML SOLN Inject 1 mL (60 mg) Subcutaneous every 6 months INDICATION: TO TREAT OSTEOPOROSIS   ezetimibe (ZETIA) 10 MG tablet TAKE 1 TABLET (10 MG) BY MOUTH DAILY INDICATION: TO LOWER  CHOLESTEROL   fexofenadine (ALLEGRA ALLERGY) 180 MG tablet Take 1 tablet (180 mg) by mouth daily   fluticasone-vilanterol (BREO ELLIPTA) 200-25 MCG/INH oral inhaler Inhale 1 puff into the lungs daily INDICATION: COPD CONTROLLER   lisinopril (PRINIVIL/ZESTRIL) 40 MG tablet Take 1 tablet (40 mg) by mouth daily INDICATION:TO LOWER BLOOD PRESSURE AND TO PRESERVE KIDNEY FUNCTION   loratadine (CLARITIN) 10 MG tablet Take 1 tablet (10 mg) by mouth daily INDICATION: TO CONTROL ALLERGY SYMPTOMS   metoprolol (TOPROL-XL) 25 MG 24 hr tablet Take 1 tablet (25 mg) by mouth daily   nicotine (NICODERM CQ) 21 MG/24HR 24 hr patch Place 1 patch onto the skin every 24 hours   nitroGLYcerin (NITROSTAT) 0.4 MG sublingual tablet Place 1 tablet (0.4 mg) under the tongue See Admin Instructions for chest pain   Omega-3 Fatty Acids (OMEGA-3 FISH OIL PO) Take 2 capsules by mouth 2 times daily   omeprazole (PRILOSEC) 20 MG CR capsule 1 capsule daily as needed (not using daily)   TRAMADOL HCL PO Take 50 mg by mouth daily as needed for moderate to severe pain     No current facility-administered medications on file prior to visit.     Social History   Substance Use Topics     Smoking status: Current Every Day Smoker     Packs/day: 0.25     Years: 40.00     Types: Cigarettes     Start date: 1960     Smokeless tobacco: Never Used      Comment: 1/2 PPD     Alcohol use 0.0 oz/week     0 Standard drinks or equivalent per week      Comment: ocasional       Family History   Problem Relation Age of Onset     Cancer Mother      bladder     Cardiovascular Father      alive,multiple heart attacks     Diabetes Maternal Grandmother      Lung Cancer Maternal Grandmother      Blood Disease Brother      clotting disorder     ROS:  CONSTITUTIONAL:  fever, chills,    INTEGUMENTARY/SKIN: NEGATIVE for worrisome rashes,    EYES: NEGATIVE for vision changes or irritation  ENT/MOUTH: NEGATIVE for ear, mouth and throat problems  GI: NEGATIVE for nausea, abdominal  pain,        OBJECTIVE: Initial physical exam  /90  Pulse 52  Temp 98.1  F (36.7  C) (Oral)  Resp 16  Wt 127 lb 12.8 oz (58 kg)  SpO2 98%  BMI 23.56 kg/m2   GENERAL: alert, moderate distress, cooperative  SKIN: skin is clear, no rashes noted  HEAD: The head is normocephalic.   EYES: conjunctivae and cornea normal.without erythema or discharge  EARS: The canals are clear, tympanic membranes normal with no erythema/effusion.  NOSE: Clear, no discharge or congestion: THROAT: moist mucous membranes, no erythema.  NECK: The neck is supple, no masses or significant adenopathy noted  LUNGS: clear to auscultation, no rales, rhonchi, wheezing or retractions  CV: regular rate and rhythm. S1 and S2 are normal. No murmurs.     ASSESSMENT:  COPD exacerbation (H)     - doxycycline (VIBRAMYCIN) 100 MG capsule; Take 1 capsule (100 mg) by mouth 2 times daily for 10 days  - albuterol (PROAIR HFA/PROVENTIL HFA/VENTOLIN HFA) 108 (90 Base) MCG/ACT Inhaler; Inhale 1-2 puffs into the lungs every 4 hours as needed for shortness of breath / dyspnea or wheezing  - benzonatate (TESSALON) 200 MG capsule; Take 1 capsule (200 mg) by mouth 3 times daily as needed for cough         Medication: continue current COPD medication regimen  -  Discuss with primary care        Discussed medication dosage, usage, side effects, and goals of   treatment in detail.     Avoidance of precipitants.    Return if worsening shortness of breath.    Follow-up with primary physician for chronic management of COPD symptoms    Patient Education: Instructed to return to urgent care or notify primary MD office of fever >101, blood in sputum, chest pain, dyspnea at rest, or other symptoms of concern to patient.

## 2018-07-23 DIAGNOSIS — K21.00 REFLUX ESOPHAGITIS: ICD-10-CM

## 2018-07-23 DIAGNOSIS — I73.9 PAD (PERIPHERAL ARTERY DISEASE) (H): ICD-10-CM

## 2018-07-23 DIAGNOSIS — E78.5 HYPERLIPIDEMIA LDL GOAL <70: ICD-10-CM

## 2018-07-23 NOTE — TELEPHONE ENCOUNTER
"Requested Prescriptions   Pending Prescriptions Disp Refills     omeprazole (PRILOSEC) 20 MG CR capsule 90 capsule 2     Si capsule daily as needed (not using daily)    PPI Protocol Failed    2018 11:33 AM       Failed - Not on Clopidogrel (unless Pantoprazole ordered)       Failed - No diagnosis of osteoporosis on record       Passed - Recent (12 mo) or future (30 days) visit within the authorizing provider's specialty    Patient had office visit in the last 12 months or has a visit in the next 30 days with authorizing provider or within the authorizing provider's specialty.  See \"Patient Info\" tab in inbasket, or \"Choose Columns\" in Meds & Orders section of the refill encounter.           Passed - Patient is age 18 or older       Passed - No active pregnacy on record       Passed - No positive pregnancy test in past 12 months        Last Written Prescription Date:  2017  Last Fill Quantity: 90,  # refills: 2   Last office visit: 2017 with prescribing provider:  [id number]   Future Office Visit:   Next 5 appointments (look out 90 days)     Aug 23, 2018 10:30 AM CDT   Return Visit with Chadwick Lozano MD   [facility] ([facility])    [address]  [place] [id number] [phone]                   "

## 2018-07-25 NOTE — TELEPHONE ENCOUNTER
Patient overdue for follow-up fasting labs and the point with me in clinic regarding multiple medical issues.  Prescription for omeprazole refilled for 30 days.  Patient to have fasting labs in the next few weeks and then see me a few days after to review results, etc.  Will then discuss whether long-term acid suppression medication necessary or not.  Prescription for omeprazole refilled for the next 30 days.  Please assist patient in scheduling appointments

## 2018-07-27 RX ORDER — ATORVASTATIN CALCIUM 80 MG/1
TABLET, FILM COATED ORAL
Qty: 30 TABLET | Refills: 0 | Status: SHIPPED | OUTPATIENT
Start: 2018-07-27 | End: 2018-08-07

## 2018-07-27 NOTE — TELEPHONE ENCOUNTER
Informed pt of msg  Pt scheduled for labs on 08/01/2018 and an office visit on 08/07/2018  Pt states also need's her lipitor refilled

## 2018-07-30 ENCOUNTER — TELEPHONE (OUTPATIENT)
Dept: INTERNAL MEDICINE | Facility: CLINIC | Age: 58
End: 2018-07-30

## 2018-07-30 ENCOUNTER — OFFICE VISIT (OUTPATIENT)
Dept: URGENT CARE | Facility: URGENT CARE | Age: 58
End: 2018-07-30
Payer: COMMERCIAL

## 2018-07-30 VITALS
SYSTOLIC BLOOD PRESSURE: 156 MMHG | RESPIRATION RATE: 20 BRPM | TEMPERATURE: 98.1 F | DIASTOLIC BLOOD PRESSURE: 80 MMHG | WEIGHT: 122.5 LBS | HEART RATE: 72 BPM | BODY MASS INDEX: 22.59 KG/M2 | OXYGEN SATURATION: 97 %

## 2018-07-30 DIAGNOSIS — R07.81 RIB PAIN ON LEFT SIDE: ICD-10-CM

## 2018-07-30 DIAGNOSIS — E78.5 HYPERLIPIDEMIA LDL GOAL <70: ICD-10-CM

## 2018-07-30 DIAGNOSIS — J44.1 COPD EXACERBATION (H): Primary | ICD-10-CM

## 2018-07-30 DIAGNOSIS — Z72.0 TOBACCO ABUSE: ICD-10-CM

## 2018-07-30 DIAGNOSIS — E54 VITAMIN C DEFICIENCY: ICD-10-CM

## 2018-07-30 LAB
ALBUMIN SERPL-MCNC: 3.7 G/DL (ref 3.4–5)
ALP SERPL-CCNC: 61 U/L (ref 40–150)
ALT SERPL W P-5'-P-CCNC: 29 U/L (ref 0–50)
ANION GAP SERPL CALCULATED.3IONS-SCNC: 5 MMOL/L (ref 3–14)
AST SERPL W P-5'-P-CCNC: 19 U/L (ref 0–45)
BILIRUB SERPL-MCNC: 0.3 MG/DL (ref 0.2–1.3)
BUN SERPL-MCNC: 13 MG/DL (ref 7–30)
CALCIUM SERPL-MCNC: 8.8 MG/DL (ref 8.5–10.1)
CHLORIDE SERPL-SCNC: 102 MMOL/L (ref 94–109)
CHOLEST SERPL-MCNC: 163 MG/DL
CO2 SERPL-SCNC: 28 MMOL/L (ref 20–32)
CREAT SERPL-MCNC: 0.5 MG/DL (ref 0.52–1.04)
GFR SERPL CREATININE-BSD FRML MDRD: >90 ML/MIN/1.7M2
GLUCOSE SERPL-MCNC: 86 MG/DL (ref 70–99)
HDLC SERPL-MCNC: 44 MG/DL
LDLC SERPL CALC-MCNC: 94 MG/DL
NONHDLC SERPL-MCNC: 119 MG/DL
POTASSIUM SERPL-SCNC: 4.1 MMOL/L (ref 3.4–5.3)
PROT SERPL-MCNC: 7.1 G/DL (ref 6.8–8.8)
SODIUM SERPL-SCNC: 135 MMOL/L (ref 133–144)
TRIGL SERPL-MCNC: 123 MG/DL

## 2018-07-30 PROCEDURE — 36415 COLL VENOUS BLD VENIPUNCTURE: CPT | Performed by: INTERNAL MEDICINE

## 2018-07-30 PROCEDURE — 99000 SPECIMEN HANDLING OFFICE-LAB: CPT | Performed by: INTERNAL MEDICINE

## 2018-07-30 PROCEDURE — 80061 LIPID PANEL: CPT | Performed by: INTERNAL MEDICINE

## 2018-07-30 PROCEDURE — 80053 COMPREHEN METABOLIC PANEL: CPT | Performed by: INTERNAL MEDICINE

## 2018-07-30 PROCEDURE — 99214 OFFICE O/P EST MOD 30 MIN: CPT | Performed by: FAMILY MEDICINE

## 2018-07-30 PROCEDURE — 82180 ASSAY OF ASCORBIC ACID: CPT | Mod: 90 | Performed by: INTERNAL MEDICINE

## 2018-07-30 RX ORDER — PREDNISONE 20 MG/1
20 TABLET ORAL 2 TIMES DAILY
Qty: 10 TABLET | Refills: 0 | Status: SHIPPED | OUTPATIENT
Start: 2018-07-30 | End: 2019-02-01

## 2018-07-30 RX ORDER — AZITHROMYCIN 250 MG/1
TABLET, FILM COATED ORAL
Qty: 6 TABLET | Refills: 0 | Status: SHIPPED | OUTPATIENT
Start: 2018-07-30 | End: 2019-02-01

## 2018-07-30 RX ORDER — BENZONATATE 200 MG/1
200 CAPSULE ORAL 3 TIMES DAILY PRN
Qty: 21 CAPSULE | Refills: 0 | Status: SHIPPED | OUTPATIENT
Start: 2018-07-30 | End: 2019-02-01

## 2018-07-30 NOTE — MR AVS SNAPSHOT
After Visit Summary   7/30/2018    Shirley Hendricks    MRN: [id number]           Patient Information     Date Of Birth          1960        Visit Information        Provider Department      7/30/2018 2:15 PM Jean Pierre Carlos DO [facility]        Today's Diagnoses     COPD exacerbation (H)    -  1    Tobacco abuse        Rib pain on left side           Follow-ups after your visit        Your next 10 appointments already scheduled     Aug 07, 2018  9:30 AM CDT   SHORT with Vasquez Benoit MD   [facility]    [address] [address]   [phone]            Aug 23, 2018  9:30 AM CDT   US CAROTID BILATERAL with [id number]   [facility] ([facility])    [address]  [facility] [address]   [phone]           Please bring a list of your medicines (including vitamins, minerals and over-the-counter drugs). Also, tell your doctor about any allergies you may have. Wear comfortable clothes and leave your valuables at home.  You do not need to do anything special to prepare for your exam.  Please call the Imaging Department at your exam site with any questions.            Aug 23, 2018 10:30 AM CDT   Return Visit with Chadwick Lozano MD   [facility] ([facility])    [address]  [facility] [id number] [phone]              Who to contact     If you have questions or need follow up information about today's clinic visit or your schedule please contact [facility] directly at [phone].  Normal or non-critical lab and imaging results will be communicated to you by MyChart, letter or phone within 4 business days after the clinic has received the results. If you do not hear from us within 7 days,  please contact the clinic through [employer] or phone. If you have a critical or abnormal lab result, we will notify you by phone as soon as possible.  Submit refill requests through [employer] or call your pharmacy and they will forward the refill request to us. Please allow 3 business days for your refill to be completed.          Additional Information About Your Visit        [employer] Information     [employer] gives you secure access to your electronic health record. If you see a primary care provider, you can also send messages to your care team and make appointments. If you have questions, please call your primary care clinic.  If you do not have a primary care provider, please call [phone] and they will assist you.        Care EveryWhere ID     This is your Care EveryWhere ID. This could be used by other organizations to access your [place] medical records  [id number]        Your Vitals Were     Pulse Temperature Respirations Pulse Oximetry BMI (Body Mass Index)       72 98.1  F (36.7  C) (Oral) 20 97% 22.59 kg/m2        Blood Pressure from Last 3 Encounters:   07/30/18 156/80   07/13/18 140/90   02/15/18 128/66    Weight from Last 3 Encounters:   07/30/18 122 lb 8 oz (55.6 kg)   07/13/18 127 lb 12.8 oz (58 kg)   12/07/17 131 lb (59.4 kg)              Today, you had the following     No orders found for display         Today's Medication Changes          These changes are accurate as of 7/30/18  2:53 PM.  If you have any questions, ask your nurse or doctor.               Start taking these medicines.        Dose/Directions    azithromycin 250 MG tablet   Commonly known as:  ZITHROMAX   Used for:  COPD exacerbation (H)   Started by:  Jean Pierre Carlos DO        Two tablets first day, then one tablet daily for four days.   Quantity:  6 tablet   Refills:  0       benzonatate 200 MG capsule   Commonly known as:  TESSALON   Used for:  COPD exacerbation (H)   Started by:  Jean Pierre Carlos DO        Dose:  200 mg    Take 1 capsule (200 mg) by mouth 3 times daily as needed for cough   Quantity:  21 capsule   Refills:  0       predniSONE 20 MG tablet   Commonly known as:  DELTASONE   Used for:  COPD exacerbation (H)   Started by:  Jean Pierre Carlos DO        Dose:  20 mg   Take 1 tablet (20 mg) by mouth 2 times daily for 5 days   Quantity:  10 tablet   Refills:  0            Where to get your medicines      These medications were sent to [id number] [address] [address] [facility]  [id number] [address] [facility] [place] [address]     Phone:  [phone]     azithromycin 250 MG tablet    benzonatate 200 MG capsule    predniSONE 20 MG tablet                Primary Care Provider Office Phone # Fax #    Vasquez Benoit [phone]       [address] [facility] [address]        Equal Access to Services     JAEL CRUMP : Hadii adela bruno hadasho Soomaali, waaxda luqadaha, qaybta kaalmada adeegyada, alesia dowd . So [facility] [phone].    ATENCIÓN: Si habla español, tiene a mahmood disposición servicios gratuitos de asistencia lingüística. [facility] [phone].    We comply with applicable federal civil rights laws and [place] laws. We do not discriminate on the basis of race, color, national origin, age, disability, sex, sexual orientation, or gender identity.            Thank you!     Thank you for choosing [facility]  for your care. Our goal is always to provide you with excellent care. Hearing back from our patients is one way we can continue to improve our services. Please take a few minutes to complete the written survey that you may receive in the mail after your visit with us. Thank you!             Your Updated Medication List - Protect others around you: Learn how to safely use, store and throw away your medicines at www.disposemymeds.org.          This list is accurate as of 7/30/18  2:53 PM.  Always use your most recent med list.                    Brand Name Dispense Instructions for use Diagnosis    * albuterol 108 (90 Base) MCG/ACT Inhaler    PROAIR HFA/PROVENTIL HFA/VENTOLIN HFA    1 Inhaler    Inhale 2 puffs into the lungs every 6 hours as needed for shortness of breath / dyspnea or wheezing    Chronic obstructive pulmonary disease, unspecified COPD type (H)       * albuterol 108 (90 Base) MCG/ACT Inhaler    PROAIR HFA/PROVENTIL HFA/VENTOLIN HFA    1 Inhaler    Inhale 1-2 puffs into the lungs every 4 hours as needed for shortness of breath / dyspnea or wheezing    COPD exacerbation (H)       ASPIRIN EC PO      Take 81 mg by mouth daily        atorvastatin 80 MG tablet    LIPITOR    30 tablet    TAKE ONE TABLET (80MG) BY MOUTH EVERY EVENING INDICATION:TO LOWER CHOLESTEROL AND TO HELP REDUCE RISK OF REOCURRENCE OF HEAR ATTACK STROKE,A    PAD (peripheral artery disease) (H), Hyperlipidemia LDL goal <70       azithromycin 250 MG tablet    ZITHROMAX    6 tablet    Two tablets first day, then one tablet daily for four days.    COPD exacerbation (H)       benzonatate 200 MG capsule    TESSALON    21 capsule    Take 1 capsule (200 mg) by mouth 3 times daily as needed for cough    COPD exacerbation (H)       buPROPion 150 MG 24 hr tablet    WELLBUTRIN XL    30 tablet    Take 1 tablet (150 mg) by mouth every morning    Mild major depression (H), Tobacco use disorder       Calcium Carbonate-Vitamin D3 600-400 MG-UNIT Tabs    CALCIUM 600-D    100 tablet    Take 1 tablet by mouth 2 times daily (with meals)    Vitamin D deficiency       cholecalciferol 2000 units Caps     100 capsule    Take 2 capsules by mouth daily FOR VITAMIN D DEFICIENCY (LOW VITAMIN D)    Vitamin D deficiency       clopidogrel 75 MG tablet    PLAVIX    90 tablet    TAKE ONE TABLET BY MOUTH ONE TIME DAILY    Peripheral vascular disease (H), History of TIA (transient ischemic attack) and stroke       denosumab 60 MG/ML Soln injection    PROLIA    1 Syringe    Inject 1 mL (60 mg)  Subcutaneous every 6 months INDICATION: TO TREAT OSTEOPOROSIS    Osteoporosis       ezetimibe 10 MG tablet    ZETIA    90 tablet    TAKE 1 TABLET (10 MG) BY MOUTH DAILY INDICATION: TO LOWER CHOLESTEROL    PAD (peripheral artery disease) (H), Coronary artery disease involving native coronary artery of native heart without angina pectoris       fexofenadine 180 MG tablet    ALLEGRA ALLERGY    30 tablet    Take 1 tablet (180 mg) by mouth daily    Post-nasal drainage       fluticasone-vilanterol 200-25 MCG/INH oral inhaler    BREO ELLIPTA    1 Inhaler    Inhale 1 puff into the lungs daily INDICATION: COPD CONTROLLER    Chronic obstructive pulmonary disease, unspecified COPD type (H), Tobacco use disorder       lisinopril 40 MG tablet    PRINIVIL/ZESTRIL    90 tablet    Take 1 tablet (40 mg) by mouth daily INDICATION:TO LOWER BLOOD PRESSURE AND TO PRESERVE KIDNEY FUNCTION    PAD (peripheral artery disease) (H)       loratadine 10 MG tablet    CLARITIN    90 tablet    Take 1 tablet (10 mg) by mouth daily INDICATION: TO CONTROL ALLERGY SYMPTOMS    Chronic allergic rhinitis due to animal hair and dander       metoprolol succinate 25 MG 24 hr tablet    TOPROL-XL    90 tablet    Take 1 tablet (25 mg) by mouth daily    Essential hypertension       nicotine 21 MG/24HR 24 hr patch    NICODERM CQ    30 patch    Place 1 patch onto the skin every 24 hours    Tobacco use disorder       nitroGLYcerin 0.4 MG sublingual tablet    NITROSTAT    15 tablet    Place 1 tablet (0.4 mg) under the tongue See Admin Instructions for chest pain    Personal history of MI (myocardial infarction)       OMEGA-3 FISH OIL PO      Take 2 capsules by mouth 2 times daily        omeprazole 20 MG CR capsule    priLOSEC    30 capsule    1 capsule daily as needed (not using daily)    Reflux esophagitis       predniSONE 20 MG tablet    DELTASONE    10 tablet    Take 1 tablet (20 mg) by mouth 2 times daily for 5 days    COPD exacerbation (H)       TRAMADOL HCL  PO      Take 50 mg by mouth daily as needed for moderate to severe pain        TYLENOL PO      Take 1,000-1,500 mg by mouth every 6 hours as needed for mild pain or fever        * Notice:  This list has 2 medication(s) that are the same as other medications prescribed for you. Read the directions carefully, and ask your doctor or other care provider to review them with you.

## 2018-07-30 NOTE — TELEPHONE ENCOUNTER
Panel Management Review    Patient Active Problem List   Diagnosis     Reflux esophagitis     Health Care Home     Osteoporosis     Cervicalgia     CAD (coronary artery disease)     PVD (peripheral vascular disease) (H)     Hyperlipidemia LDL goal <70     PAD (peripheral artery disease) (H)     Essential hypertension     Vitamin D deficiency     Neuropathy     Coronary artery disease involving native coronary artery of native heart without angina pectoris     Primary osteoarthritis involving multiple joints     DDD (degenerative disc disease), lumbar     Chronic pain syndrome     Stroke (H)     Bilateral carotid artery stenosis     S/P carotid endarterectomy     Chronic allergic rhinitis due to animal hair and dander     Tobacco use disorder     Chronic obstructive pulmonary disease, unspecified COPD type (H)     Mild major depression (H)     Anxiety       Patient has the following on her problem list:     Depression / Dysthymia review    Measure:  Needs PHQ-9 score of 4 or less during index window.  Administer PHQ-9 and if score is 5 or more, send encounter to provider for next steps.    5 - 7 month window range: 7    PHQ-9 SCORE 3/3/2016 3/28/2017 11/7/2017   Total Score 7 4 8       If PHQ-9 recheck is 5 or more, route to provider for next steps.    Patient is due for:  PHQ9    Hypertension   Last three blood pressure readings:  BP Readings from Last 3 Encounters:   07/30/18 156/80   07/13/18 140/90   02/15/18 128/66     Blood pressure: FAILED    HTN Guidelines:  Age 18-59 BP range:  Less than 140/90  Age 60-85 with Diabetes:  Less than 140/90  Age 60-85 without Diabetes:  less than 150/90     COPD  Diagnosis date: unknown   Is this diagnosis new within the last year?   NO   Was spirometry completed? unknown      Composite cancer screening  Chart review shows that this patient is due/due soon for the following Colonoscopy  Summary:    Patient is due/failing the following:   COLONOSCOPY    Action needed:    Patient needs to do PHQ9.    Type of outreach:    Sent letter.    Questions for provider review:    None                                                                                                                                    Jaqueline Rojo, BOUBACAR

## 2018-07-30 NOTE — PROGRESS NOTES
SUBJECTIVE: Shirley Hendricks is a 57 year old female presenting with a chief complaint of nasal congestion, cough  and LEFT RIB PAIN FROM COUGH SO MUCH.  Onset of symptoms was 1 month(s) ago.  Course of illness is waxing and waning.    Severity moderate  Current and Associated symptoms: runny nose, stuffy nose and cough - non-productive  Treatment measures tried include Inhaler (name: ALB BUT ONLY USING AT NIGHT).  Predisposing factors include tobacco use and HX of COPD.    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 clot     Post-menopausal      Reflux esophagitis 2/04    EGD: esophagitis and medium HH     Uncomplicated asthma      Allergies   Allergen Reactions     Contrast Dye Anaphylaxis     RASH, FACIAL AND NECK SWELLING, SOB, WHEEZING     Pantoprazole      Protonix caused diffuse edema     Penicillins Itching     Social History   Substance Use Topics     Smoking status: Current Every Day Smoker     Packs/day: 0.25     Years: 40.00     Types: Cigarettes     Start date: 1960     Smokeless tobacco: Never Used      Comment: 1/2 PPD     Alcohol use 0.0 oz/week     0 Standard drinks or equivalent per week      Comment: ocasional       ROS:  SKIN: no rash  GI: no vomiting    OBJECTIVE:  /80 (BP Location: Left arm,  Patient Position: Sitting, Cuff Size: Adult Regular)  Pulse 72  Temp 98.1  F (36.7  C) (Oral)  Resp 20  Wt 122 lb 8 oz (55.6 kg)  SpO2 97%  BMI 22.59 kg/c3BJEWJIL APPEARANCE: healthy, alert and no distress  EYES: EOMI,  PERRL, conjunctiva clear  HENT: ear canals and TM's normal.  Nose and mouth without ulcers, erythema or lesions  NECK: supple, nontender, no lymphadenopathy  RESP: lungs clear to auscultation - no rales, rhonchi or wheezes  CV: regular rates and rhythm, normal S1 S2, no murmur noted  SKIN: no suspicious lesions or rashes  LEFT LATERAL RIB PAIN WITH PALPATION AND ROM      ICD-10-CM    1. COPD exacerbation (H) J44.1 azithromycin (ZITHROMAX) 250 MG tablet     benzonatate (TESSALON) 200 MG capsule     predniSONE (DELTASONE) 20 MG tablet   2. Tobacco abuse Z72.0    3. Rib pain on left side R07.81      QUIT TOBACCO  HAS F/U WITH  NEXT WEEK  Fluids/Rest, f/u if worse/not any better

## 2018-07-30 NOTE — LETTER
[facility]  [address] [address]  [phone]  July 31, 2018    Shirley Hendricks  [address]  [facility] [address]    Dear Shirley,    We care about your health and based on a review of your medical records, recommend the the following, to better manage your health:      You are in particular need of attention regarding:  -Colon Cancer Screening    I am recommending that you:     -schedule a COLONOSCOPY to look for colon cancer (due every 10 years or 5 years in higher risk situations.)        Colon cancer is now the second leading cause of cancer-related deaths in the [facility] for both men and women and there are over 130,000 new cases and 50,000 deaths per year from colon cancer.  Colonoscopies can prevent 90-95% of these deaths.  Problem lesions can be removed before they ever become cancer.  This test is not only looking for cancer, but also getting rid of precancerious lesions.    If you are under/uninsured, we recommend you contact the [employer] program. [employer] is a free colorectal cancer screening program that provides colonoscopies for eligible under/uninsured [place] men and women. If you are interested in receiving a free colonoscopy, please call [employer] at [phone] (mention code [employer]) to see if you re eligible.      If you do not wish to do a colonoscopy or cannot afford to do one, at this time, there is another option. It is called a FIT test or Fecal Immunochemical Occult Blood Test (take home stool sample kit).  It does not replace the colonoscopy for colorectal cancer screening, but it can detect hidden bleeding in the lower colon.  It does need to be repeated every year and if a positive result is obtained, you would be referred for a colonoscopy.          If you have completed either one of these tests at another facility, please call with the details of when and where the tests were  done and if they were normal or not. Or have the records sent to our clinic so that we can best coordinate your care.      Here is a list of Health Maintenance topics that are due now or due soon:  Health Maintenance Due   Topic Date Due     URINE DRUG SCREEN Q1 YR  10/13/1973     HIV SCREEN (SYSTEM ASSIGNED)  10/13/1976     COPD ACTION PLAN Q1 YR  07/07/2015     Talk to your care team about options to quit tobacco use.  09/15/2016     Colon Cancer Screening - FIT Test - yearly  09/13/2017     Thyroid Function Lab (TSH) - yearly  03/28/2018     Depression Assessment - every 6 months  05/07/2018     Tetanus Vaccine - every 10 years  06/17/2018     Lung Cancer Screening (CT Scan) - yearly  08/11/2018       Please call us at [phone] or [phone] (or use DoubleUp) to address the above recommendations.     Thank you for trusting [facility].  We appreciate the opportunity to serve you and look forward to supporting your healthcare needs in the future.    If you have (or plan to have) any of these tests done at a facility other than a [facility] or a [facility], please have the results from these tests sent to your primary physician at [facility].    Healthy Regards,    Vasquez Benoit MD/Jaqueline MCGHEE [facility]

## 2018-08-02 LAB — VIT C SERPL-MCNC: <5 UMOL/L (ref 23–114)

## 2018-08-07 ENCOUNTER — OFFICE VISIT (OUTPATIENT)
Dept: INTERNAL MEDICINE | Facility: CLINIC | Age: 58
End: 2018-08-07
Payer: COMMERCIAL

## 2018-08-07 VITALS
TEMPERATURE: 98.6 F | HEIGHT: 62 IN | SYSTOLIC BLOOD PRESSURE: 142 MMHG | WEIGHT: 123 LBS | HEART RATE: 58 BPM | DIASTOLIC BLOOD PRESSURE: 84 MMHG | RESPIRATION RATE: 16 BRPM | BODY MASS INDEX: 22.63 KG/M2 | OXYGEN SATURATION: 98 %

## 2018-08-07 DIAGNOSIS — Z12.11 SCREEN FOR COLON CANCER: ICD-10-CM

## 2018-08-07 DIAGNOSIS — F17.200 TOBACCO USE DISORDER: ICD-10-CM

## 2018-08-07 DIAGNOSIS — E54 VITAMIN C DEFICIENCY: ICD-10-CM

## 2018-08-07 DIAGNOSIS — I10 ESSENTIAL HYPERTENSION: ICD-10-CM

## 2018-08-07 DIAGNOSIS — M81.0 OSTEOPOROSIS WITHOUT CURRENT PATHOLOGICAL FRACTURE, UNSPECIFIED OSTEOPOROSIS TYPE: ICD-10-CM

## 2018-08-07 DIAGNOSIS — I73.9 PERIPHERAL VASCULAR DISEASE (H): ICD-10-CM

## 2018-08-07 DIAGNOSIS — J44.9 CHRONIC OBSTRUCTIVE PULMONARY DISEASE, UNSPECIFIED COPD TYPE (H): ICD-10-CM

## 2018-08-07 DIAGNOSIS — K21.00 REFLUX ESOPHAGITIS: ICD-10-CM

## 2018-08-07 DIAGNOSIS — Z23 NEED FOR PROPHYLACTIC VACCINATION WITH TETANUS-DIPHTHERIA (TD): ICD-10-CM

## 2018-08-07 DIAGNOSIS — E78.5 HYPERLIPIDEMIA LDL GOAL <70: ICD-10-CM

## 2018-08-07 PROCEDURE — 90471 IMMUNIZATION ADMIN: CPT | Performed by: INTERNAL MEDICINE

## 2018-08-07 PROCEDURE — 90714 TD VACC NO PRESV 7 YRS+ IM: CPT | Performed by: INTERNAL MEDICINE

## 2018-08-07 PROCEDURE — 99214 OFFICE O/P EST MOD 30 MIN: CPT | Mod: 25 | Performed by: INTERNAL MEDICINE

## 2018-08-07 RX ORDER — ATORVASTATIN CALCIUM 80 MG/1
TABLET, FILM COATED ORAL
Qty: 90 TABLET | Refills: 3 | Status: SHIPPED | OUTPATIENT
Start: 2018-08-07 | End: 2019-09-24

## 2018-08-07 RX ORDER — CLOPIDOGREL BISULFATE 75 MG/1
75 TABLET ORAL DAILY
Qty: 90 TABLET | Refills: 2 | Status: SHIPPED | OUTPATIENT
Start: 2018-08-07 | End: 2019-06-24

## 2018-08-07 RX ORDER — METOPROLOL SUCCINATE 25 MG/1
25 TABLET, EXTENDED RELEASE ORAL DAILY
Qty: 90 TABLET | Refills: 3 | Status: SHIPPED | OUTPATIENT
Start: 2018-08-07 | End: 2019-09-24

## 2018-08-07 RX ORDER — LISINOPRIL 40 MG/1
40 TABLET ORAL DAILY
Qty: 90 TABLET | Refills: 3 | Status: SHIPPED | OUTPATIENT
Start: 2018-08-07 | End: 2019-01-21

## 2018-08-07 NOTE — PROGRESS NOTES
SUBJECTIVE:   Shirley Hendricks is a 57 year old female who presents to clinic today for the following health issues:    Chief Complaint   Patient presents with     Depression     Hypertension     Hyperlipidemia     Anxiety       Hyperlipidemia Follow-Up      Rate your low fat/cholesterol diet?: not monitoring fat    Taking statin?  Yes, no muscle aches from statin    Other lipid medications/supplements?:  none    Hypertension Follow-up      Outpatient blood pressures are not being checked.    Low Salt Diet: low salt    Depression Followup    Status since last visit: Stable     See PHQ-9 for current symptoms.  Other associated symptoms: None    Complicating factors:   Significant life event:  Yes-  Illness, cough that wakes patient up at night ongoing since july   Current substance abuse:  None  Anxiety or Panic symptoms:  No    PHQ-9 3/28/2017 11/7/2017 8/7/2018   Total Score 4 8 0   Q9: Suicide Ideation Not at all Not at all Not at all           Amount of exercise or physical activity: House work    Problems taking medications regularly: No    Medication side effects: none    Diet: regular (no restrictions)      Pt's past medical history, family history, habits, medications and allergies were reviewed with the patient today.  See snap shot for  HCM status. Most recent lab results reviewed with pt. Problem list and histories reviewed & adjusted, as indicated.  Additional history as below:    Pt continues to smoke. BP elevated. Off of  Lisinopril. Denies chest pain, abdominal pain, headache, vision changes or side effects with medications. Denies  claudication sx in > BLE LEs with usual walking. Hx PAD. Last vascular surgery notes from Feb 2018 reviewed.   Mood good. PHQ=0.   Hx osteoporosis. Due for  Prolia injection  Had COPD exacerbation last week. Seen in UC and treated with prednisone. Breathing improved since then but not back to baseline. HAS not been using Breo inhaler recently    Component      Latest  "Ref Rng & Units 7/30/2018   Sodium      133 - 144 mmol/L 135   Potassium      3.4 - 5.3 mmol/L 4.1   Chloride      94 - 109 mmol/L 102   Carbon Dioxide      20 - 32 mmol/L 28   Anion Gap      3 - 14 mmol/L 5   Glucose      70 - 99 mg/dL 86   Urea Nitrogen      7 - 30 mg/dL 13   Creatinine      0.52 - 1.04 mg/dL 0.50 (L)   GFR Estimate      >60 mL/min/1.7m2 >90   GFR Estimate If Black      >60 mL/min/1.7m2 >90   Calcium      8.5 - 10.1 mg/dL 8.8   Bilirubin Total      0.2 - 1.3 mg/dL 0.3   Albumin      3.4 - 5.0 g/dL 3.7   Protein Total      6.8 - 8.8 g/dL 7.1   Alkaline Phosphatase      40 - 150 U/L 61   ALT      0 - 50 U/L 29   AST      0 - 45 U/L 19   Cholesterol      <200 mg/dL 163   Triglycerides      <150 mg/dL 123   HDL Cholesterol      >49 mg/dL 44 (L)   LDL Cholesterol Calculated      <100 mg/dL 94   Non HDL Cholesterol      <130 mg/dL 119   Vitamin C      23 - 114 umol/L <5 (L)            Additional ROS:   Constitutional, HEENT, Cardiovascular, Pulmonary, GI and , Neuro, MSK and Psych review of systems/symptoms are otherwise negative or unchanged from previous, except as noted above.      OBJECTIVE:  /84  Pulse 58  Temp 98.6  F (37  C) (Oral)  Resp 16  Ht 5' 1.5\" (1.562 m)  Wt 123 lb (55.8 kg)  SpO2 98%  BMI 22.86 kg/m2   Estimated body mass index is 22.86 kg/(m^2) as calculated from the following:    Height as of this encounter: 5' 1.5\" (1.562 m).    Weight as of this encounter: 123 lb (55.8 kg).  Eye: PERRL, EOMI  HENT: ear canals and TM's normal and nose and mouth without ulcers or lesions   Neck: no adenopathy. Thyroid normal to palpation. No bruits  Pulm: Lungs clear to auscultation with prolonged exp phase. No wheezes now. No accessory muscle use  CV: Regular rates and rhythm  GI: Soft, nontender, Normal active bowel sounds, No hepatosplenomegaly or masses palpable  Ext: Peripheral pulses intact. No edema.  Neuro: Normal strength and tone, sensory exam grossly normal    Assessment/Plan: " "(See plan discussion below for further details)  1. Chronic obstructive pulmonary disease, unspecified COPD type (H)  Improved but not controlled. See plan discussion below.   - fluticasone-vilanterol (BREO ELLIPTA) 200-25 MCG/INH oral inhaler; Inhale 1 puff into the lungs daily INDICATION: COPD CONTROLLER  Dispense: 3 Inhaler; Refill: 3    2. Peripheral vascular disease (H)   Denies claudication. Continue Plavix. Counseled re\": smoking cessation. Follow-up with [facility] later this month as scheduled  - clopidogrel (PLAVIX) 75 MG tablet; Take 1 tablet (75 mg) by mouth daily  Dispense: 90 tablet; Refill: 2    3. Tobacco use disorder   Declines smoking cessation. Restart inhaler as above  - fluticasone-vilanterol (BREO ELLIPTA) 200-25 MCG/INH oral inhaler; Inhale 1 puff into the lungs daily INDICATION: COPD CONTROLLER  Dispense: 3 Inhaler; Refill: 3    4. Hyperlipidemia LDL goal <70  Continue high dose statin. Recent LFTs OK  - atorvastatin (LIPITOR) 80 MG tablet; TAKE ONE TABLET (80MG) BY MOUTH EVERY EVENING INDICATION:TO LOWER CHOLESTEROL AND TO HELP REDUCE RISK OF REOCURRENCE OF HEAR ATTACK STROKE,A  Dispense: 90 tablet; Refill: 3    5. Essential hypertension  Uncontrolled. Restart Lisionopril. WIll have BPO recheck later this month with [facility] clinic appt  - lisinopril (PRINIVIL/ZESTRIL) 40 MG tablet; Take 1 tablet (40 mg) by mouth daily INDICATION:TO LOWER BLOOD PRESSURE AND TO PRESERVE KIDNEY FUNCTION  Dispense: 90 tablet; Refill: 3  - metoprolol succinate (TOPROL-XL) 25 MG 24 hr tablet; Take 1 tablet (25 mg) by mouth daily  Dispense: 90 tablet; Refill: 3    6. Osteoporosis without current pathological fracture, unspecified osteoporosis type   Needs additioanl lab work. If OK, will then order Prolia injection to be done in clinic  - denosumab (PROLIA) 60 MG/ML SOLN injection; Inject 1 mL (60 mg) Subcutaneous every 6 months INDICATION: TO TREAT OSTEOPOROSIS  Dispense: 1 Syringe; Refill: PRN  - " Magnesium; Future  - Phosphorus; Future    7. Reflux esophagitis  Controlled as long as taking PPI. Pt counseled re: tobacco use and increased GERD risk along with esoph CA risk increase with tobacco  - omeprazole (PRILOSEC) 20 MG CR capsule; 1 capsule daily as needed (not using daily)  Dispense: 90 capsule; Refill: 3    8. Screen for colon cancer  Declines  colonoscopy despite MD recommendation for the procedure. FIT test ordered previously and pt will PU kit today. Reviewed the reduced sensitivity of the FIT test for colon cancer screening compared to colonoscopy and pt states understanding of this. Patient to inform physician in the future if willing to undergo future colonoscopy.       9. Need for prophylactic vaccination with tetanus-diphtheria (TD)  - TD (ADULT, 7+) PRESERVE FREE  -      ADMIN VACCINE, FIRST    10. Vitamin C deficiency   Pt to start Vit C and recheck levels 3 mos  - Vitamin C; Future  - ascorbic acid (VITAMIN C) 500 MG tablet; 1 tab twice a day    Plan discussion:   Restart Breo inhaler  daily in AM to clear  and open airways. May use Albuterol as needed frr extra shortness of breath   Pt counselled re: smoking cessation.  Pt to start Nicotine patch 21mg daily if willing  Restart Lisinopril 1 tab daily for blood pressure control. See me in 1 month for follow-up  Continue other meds   Vit C 500mg tab ,1 tab twice a day. Get over the counter. Repeat Vit C level in 3 months  Td (tetanus) vaccine  Colonoscopy  screening when willing.   FIT stool test kit today in lab   Labs for Mag and Phos as required  Before getting Prolia injection which it is due for (recent calcium level normal). Will speak with nursing staff about getting med ordered for future injection         Vasquez Benoit MD  Internal Medicine Department  [facility]

## 2018-08-07 NOTE — PATIENT INSTRUCTIONS
Restart Breo inhaler  daily in AM to clear  and open airways. May use Albuterol as needed frr extra shortness of breath   Pt counselled re: smoking cessation.  Pt to start Nicotine patch 21mg daily if willing  Restart Lisinopril 1 tab daily for blood pressure control. See me in 1 month for follow-up  Continue other meds   Vit C 500mg tab ,1 tab twice a day. Get over the counter. Repeat Vit C level in 3 months  Td (tetanus) vaccine  Colonoscopy  screening when willing.   FIT stool test kit today in lab   Labs for Mag and Phos as required  Before getting Prolia injection which it is due for (recent calcium level normal). Will speak with nursing staff about getting med ordered for future injection

## 2018-08-07 NOTE — MR AVS SNAPSHOT
After Visit Summary   8/7/2018    Shirley Hendricks    MRN: [id number]           Patient Information     Date Of Birth          1960        Visit Information        Provider Department      8/7/2018 9:30 AM Vasquez Benoit MD [facility]        Today's Diagnoses     Screen for colon cancer        Screening for HIV (human immunodeficiency virus)        Tobacco use disorder        Need for prophylactic vaccination with tetanus-diphtheria (TD)        Chronic obstructive pulmonary disease, unspecified COPD type (H)        PAD (peripheral artery disease) (H)        Hyperlipidemia LDL goal <70        Peripheral vascular disease (H)        History of TIA (transient ischemic attack) and stroke        Essential hypertension        Reflux esophagitis          Care Instructions     Restart Breo inhaler  daily in AM to clear  and open airways. May use Albuterol as needed frr extra shortness of breath   Pt counselled re: smoking cessation.  Pt to start Nicotine patch 21mg daily if willing  Restart Lisinopril 1 tab daily for blood pressure control. See me in 1 month for follow-up  Continue other meds   Vit C 500mg tab ,1 tab twice a day. Get over the counter  Td (tetanus) vaccine  Future colon cancer screening  Prolia injection at next visit          Follow-ups after your visit        Your next 10 appointments already scheduled     Aug 23, 2018  9:30 AM CDT   US CAROTID BILATERAL with [id number]   [facility]    [address]  [place] [address]   [phone]           Please bring a list of your medicines (including vitamins, minerals and over-the-counter drugs). Also, tell your doctor about any allergies you may have. Wear comfortable clothes and leave your valuables at home.  You do not need to do anything special to prepare for your exam.  Please call the Imaging Department at your exam site with  "any questions.            Aug 23, 2018 10:30 AM CDT   Return Visit with Chadwick Lozano MD   [facility] ([facility])    [address]  [place] [id number] [phone]              Who to contact     If you have questions or need follow up information about today's clinic visit or your schedule please contact [facility] directly at [phone].  Normal or non-critical lab and imaging results will be communicated to you by oneDrumhart, letter or phone within 4 business days after the clinic has received the results. If you do not hear from us within 7 days, please contact the clinic through Here On Biz or phone. If you have a critical or abnormal lab result, we will notify you by phone as soon as possible.  Submit refill requests through Here On Biz or call your pharmacy and they will forward the refill request to us. Please allow 3 business days for your refill to be completed.          Additional Information About Your Visit        oneDrumharViratech Information     Here On Biz gives you secure access to your electronic health record. If you see a primary care provider, you can also send messages to your care team and make appointments. If you have questions, please call your primary care clinic.  If you do not have a primary care provider, please call [phone] and they will assist you.        Care EveryWhere ID     This is your Care EveryWhere ID. This could be used by other organizations to access your [place] medical records  [id number]        Your Vitals Were     Pulse Temperature Respirations Height Pulse Oximetry BMI (Body Mass Index)    58 98.6  F (37  C) (Oral) 16 5' 1.5\" (1.562 m) 98% 22.86 kg/m2       Blood Pressure from Last 3 Encounters:   08/07/18 142/84   07/30/18 156/80   07/13/18 140/90    Weight from Last 3 Encounters:   08/07/18 123 lb (55.8 kg)   07/30/18 122 lb 8 oz (55.6 kg)   07/13/18 127 lb " 12.8 oz (58 kg)              We Performed the Following          ADMIN VACCINE, FIRST     TD (ADULT, 7+) PRESERVE FREE          Today's Medication Changes          These changes are accurate as of 8/7/18 10:12 AM.  If you have any questions, ask your nurse or doctor.               These medicines have changed or have updated prescriptions.        Dose/Directions    albuterol 108 (90 Base) MCG/ACT Inhaler   Commonly known as:  PROAIR HFA/PROVENTIL HFA/VENTOLIN HFA   This may have changed:  Another medication with the same name was removed. Continue taking this medication, and follow the directions you see here.   Used for:  COPD exacerbation (H)   Changed by:  Vasquez Benoit MD        Dose:  1-2 puff   Inhale 1-2 puffs into the lungs every 4 hours as needed for shortness of breath / dyspnea or wheezing   Quantity:  1 Inhaler   Refills:  0       clopidogrel 75 MG tablet   Commonly known as:  PLAVIX   This may have changed:  See the new instructions.   Used for:  Peripheral vascular disease (H), History of TIA (transient ischemic attack) and stroke   Changed by:  Vasquez Benoit MD        Dose:  75 mg   Take 1 tablet (75 mg) by mouth daily   Quantity:  90 tablet   Refills:  2         Stop taking these medicines if you haven't already. Please contact your care team if you have questions.     nicotine 21 MG/24HR 24 hr patch   Commonly known as:  NICODERM CQ   Stopped by:  Vasquez Benoit MD                Where to get your medicines      These medications were sent to [id number] [address]  [address] [facility] [address]     Phone:  [phone]     atorvastatin 80 MG tablet    clopidogrel 75 MG tablet    fluticasone-vilanterol 200-25 MCG/INH oral inhaler    lisinopril 40 MG tablet    metoprolol succinate 25 MG 24 hr tablet    omeprazole 20 MG CR capsule                Primary Care Provider Office Phone # Fax #    Vasquez Benoit [phone] [address] [facility] [address]        Equal Access to Services     JAEL CRUMP : Hadii aad ku hadgarciajean carlos Vásquez, waalfonsoda marcia, qaybta chelitamazoë dominguez, alesia martneryalicia cerda. So [facility] [phone].    ATENCIÓN: Si habla español, tiene a mahmood disposición servicios gratuitos de asistencia lingüística. Llame al [phone].    We comply with applicable federal civil rights laws and [place] laws. We do not discriminate on the basis of race, color, national origin, age, disability, sex, sexual orientation, or gender identity.            Thank you!     Thank you for choosing [facility]  for your care. Our goal is always to provide you with excellent care. Hearing back from our patients is one way we can continue to improve our services. Please take a few minutes to complete the written survey that you may receive in the mail after your visit with us. Thank you!             Your Updated Medication List - Protect others around you: Learn how to safely use, store and throw away your medicines at www.disposemymeds.org.          This list is accurate as of 8/7/18 10:12 AM.  Always use your most recent med list.                   Brand Name Dispense Instructions for use Diagnosis    albuterol 108 (90 Base) MCG/ACT Inhaler    PROAIR HFA/PROVENTIL HFA/VENTOLIN HFA    1 Inhaler    Inhale 1-2 puffs into the lungs every 4 hours as needed for shortness of breath / dyspnea or wheezing    COPD exacerbation (H)       ASPIRIN EC PO      Take 81 mg by mouth daily        atorvastatin 80 MG tablet    LIPITOR    90 tablet    TAKE ONE TABLET (80MG) BY MOUTH EVERY EVENING INDICATION:TO LOWER CHOLESTEROL AND TO HELP REDUCE RISK OF REOCURRENCE OF HEAR ATTACK STROKE,A    PAD (peripheral artery disease) (H), Hyperlipidemia LDL goal <70       clopidogrel 75 MG tablet    PLAVIX    90 tablet    Take 1 tablet (75 mg) by mouth daily    Peripheral vascular disease (H), History of TIA (transient ischemic  attack) and stroke       denosumab 60 MG/ML Soln injection    PROLIA    1 Syringe    Inject 1 mL (60 mg) Subcutaneous every 6 months INDICATION: TO TREAT OSTEOPOROSIS    Osteoporosis       fluticasone-vilanterol 200-25 MCG/INH oral inhaler    BREO ELLIPTA    3 Inhaler    Inhale 1 puff into the lungs daily INDICATION: COPD CONTROLLER    Chronic obstructive pulmonary disease, unspecified COPD type (H), Tobacco use disorder       lisinopril 40 MG tablet    PRINIVIL/ZESTRIL    90 tablet    Take 1 tablet (40 mg) by mouth daily INDICATION:TO LOWER BLOOD PRESSURE AND TO PRESERVE KIDNEY FUNCTION    PAD (peripheral artery disease) (H)       metoprolol succinate 25 MG 24 hr tablet    TOPROL-XL    90 tablet    Take 1 tablet (25 mg) by mouth daily    Essential hypertension       nitroGLYcerin 0.4 MG sublingual tablet    NITROSTAT    15 tablet    Place 1 tablet (0.4 mg) under the tongue See Admin Instructions for chest pain    Personal history of MI (myocardial infarction)       omeprazole 20 MG CR capsule    priLOSEC    90 capsule    1 capsule daily as needed (not using daily)    Reflux esophagitis       TYLENOL PO      Take 1,000-1,500 mg by mouth every 6 hours as needed for mild pain or fever

## 2018-08-07 NOTE — NURSING NOTE
Screening Questionnaire for Adult Immunization    Are you sick today?   No   Do you have allergies to medications, food, a vaccine component or latex?   No   Have you ever had a serious reaction after receiving a vaccination?   No   Do you have a long-term health problem with heart disease, lung disease, asthma, kidney disease, metabolic disease (e.g. diabetes), anemia, or other blood disorder?   No   Do you have cancer, leukemia, HIV/AIDS, or any other immune system problem?   No   In the past 3 months, have you taken medications that affect  your immune system, such as prednisone, other steroids, or anticancer drugs; drugs for the treatment of rheumatoid arthritis, Crohn s disease, or psoriasis; or have you had radiation treatments?   No   Have you had a seizure, or a brain or other nervous system problem?   No   During the past year, have you received a transfusion of blood or blood     products, or been given immune (gamma) globulin or antiviral drug?   No   For women: Are you pregnant or is there a chance you could become        pregnant during the next month?   No   Have you received any vaccinations in the past 4 weeks?   No     Immunization questionnaire answers were all negative.        Per orders of Dr. Benoit, injection of Td given by Jaqueline Rojo. Patient instructed to remain in clinic for 15 minutes afterwards, and to report any adverse reaction to me immediately.       Screening performed by Jaqueline Rojo on 8/7/2018 at 9:37 AM.

## 2018-08-08 ASSESSMENT — PATIENT HEALTH QUESTIONNAIRE - PHQ9: SUM OF ALL RESPONSES TO PHQ QUESTIONS 1-9: 0

## 2018-08-12 PROBLEM — E54 VITAMIN C DEFICIENCY: Status: ACTIVE | Noted: 2018-08-12

## 2018-08-12 RX ORDER — ASCORBIC ACID 500 MG
TABLET ORAL
Start: 2018-08-12 | End: 2019-02-28

## 2018-08-12 RX ORDER — ASCORBIC ACID 500 MG
1000 TABLET ORAL DAILY
COMMUNITY
Start: 2018-08-12 | End: 2018-08-12

## 2018-08-23 ENCOUNTER — HOSPITAL ENCOUNTER (OUTPATIENT)
Dept: ULTRASOUND IMAGING | Facility: CLINIC | Age: 58
Discharge: HOME | End: 2018-08-23
Attending: SURGERY | Admitting: SURGERY
Payer: COMMERCIAL

## 2018-08-23 ENCOUNTER — OFFICE VISIT (OUTPATIENT)
Dept: OTHER | Facility: CLINIC | Age: 58
End: 2018-08-23
Attending: SURGERY
Payer: COMMERCIAL

## 2018-08-23 VITALS — HEART RATE: 56 BPM | SYSTOLIC BLOOD PRESSURE: 144 MMHG | DIASTOLIC BLOOD PRESSURE: 76 MMHG

## 2018-08-23 DIAGNOSIS — E78.5 HYPERLIPIDEMIA LDL GOAL <70: ICD-10-CM

## 2018-08-23 DIAGNOSIS — I65.23 BILATERAL CAROTID ARTERY STENOSIS: ICD-10-CM

## 2018-08-23 DIAGNOSIS — I73.9 PAD (PERIPHERAL ARTERY DISEASE) (H): ICD-10-CM

## 2018-08-23 DIAGNOSIS — I65.29 CAROTID ARTERY STENOSIS: Primary | ICD-10-CM

## 2018-08-23 DIAGNOSIS — Z48.812 POSTOP CAROTID ENDARTERECTOMY SURVEILLANCE, ENCOUNTER FOR: ICD-10-CM

## 2018-08-23 DIAGNOSIS — I65.22 CAROTID STENOSIS, ASYMPTOMATIC, LEFT: Primary | ICD-10-CM

## 2018-08-23 PROCEDURE — 99213 OFFICE O/P EST LOW 20 MIN: CPT | Mod: ZP | Performed by: SURGERY

## 2018-08-23 PROCEDURE — 93880 EXTRACRANIAL BILAT STUDY: CPT

## 2018-08-23 PROCEDURE — G0463 HOSPITAL OUTPT CLINIC VISIT: HCPCS | Mod: 25

## 2018-08-23 NOTE — MR AVS SNAPSHOT
After Visit Summary   8/23/2018    Shirley Hendricks    MRN: [id number]           Patient Information     Date Of Birth          1960        Visit Information        Provider Department      8/23/2018 10:30 AM Chadwick Lozano MD [facility]      Today's Diagnoses     Carotid stenosis, asymptomatic, left    -  1    Postop carotid endarterectomy surveillance, encounter for        PAD (peripheral artery disease) (H)        Hyperlipidemia LDL goal <70           Follow-ups after your visit        Follow-up notes from your care team     Return in about 9 months (around 5/23/2019).      Your next 10 appointments already scheduled     Sep 10, 2018  9:30 AM CDT   SHORT with Vasquez Benoit MD   [facility] ([facility])    [address] [place] [address] [phone]              Future tests that were ordered for you today     Open Future Orders        Priority Expected Expires Ordered    US Carotid Bilateral Routine 5/1/2019 8/23/2019 8/23/2018    US Lower Extremity Arterial Duplex Left Routine 5/1/2019 8/23/2019 8/23/2018    US XAVIER Doppler with Exercise Bilateral Routine 5/1/2019 8/23/2019 8/23/2018            Who to contact     If you have questions or need follow up information about today's clinic visit or your schedule please contact [facility] directly at [phone].  Normal or non-critical lab and imaging results will be communicated to you by MyChart, letter or phone within 4 business days after the clinic has received the results. If you do not hear from us within 7 days, please contact the clinic through MyChart or phone. If you have a critical or abnormal lab result, we will notify you by phone as soon as possible.  Submit refill requests through [employer] or call your pharmacy and they will forward the refill request to us. Please  allow 3 business days for your refill to be completed.          Additional Information About Your Visit        MyChart Information     Sensinodehart gives you secure access to your electronic health record. If you see a primary care provider, you can also send messages to your care team and make appointments. If you have questions, please call your primary care clinic.  If you do not have a primary care provider, please call [phone] and they will assist you.        Care EveryWhere ID     This is your Care EveryWhere ID. This could be used by other organizations to access your [facility] medical records  [id number]        Your Vitals Were     Pulse Breastfeeding?                56 No           Blood Pressure from Last 3 Encounters:   08/23/18 144/76   08/07/18 142/84   07/30/18 156/80    Weight from Last 3 Encounters:   08/07/18 123 lb (55.8 kg)   07/30/18 122 lb 8 oz (55.6 kg)   07/13/18 127 lb 12.8 oz (58 kg)              Today, you had the following     No orders found for display       Primary Care Provider Office Phone # Fax #    Vasquez eBnoit [phone]       [address] [facility] [address]        Equal Access to Services     JAEL CRUMP : Hadii aad ku hadasho Soomaali, waaxda luqadaha, qaybta kaalmada adeegyada, alesia vera haydionen carla cerda. So [facility] [phone].    ATENCIÓN: Si habla español, tiene a mahmood disposición servicios gratuitos de asistencia lingüística. Llame al [phone].    We comply with applicable federal civil rights laws and [place] laws. We do not discriminate on the basis of race, color, national origin, age, disability, sex, sexual orientation, or gender identity.            Thank you!     Thank you for choosing [facility] [place]  for your care. Our goal is always to provide you with excellent care. Hearing back from our patients is one way we can continue to improve our services. Please take a few minutes to complete the written survey  that you may receive in the mail after your visit with us. Thank you!             Your Updated Medication List - Protect others around you: Learn how to safely use, store and throw away your medicines at www.disposemymeds.org.          This list is accurate as of 8/23/18 11:08 AM.  Always use your most recent med list.                   Brand Name Dispense Instructions for use Diagnosis    albuterol 108 (90 Base) MCG/ACT inhaler    PROAIR HFA/PROVENTIL HFA/VENTOLIN HFA    1 Inhaler    Inhale 1-2 puffs into the lungs every 4 hours as needed for shortness of breath / dyspnea or wheezing    COPD exacerbation (H)       ascorbic acid 500 MG tablet    VITAMIN C     1 tab twice a day    Vitamin C deficiency       ASPIRIN EC PO      Take 81 mg by mouth daily        atorvastatin 80 MG tablet    LIPITOR    90 tablet    TAKE ONE TABLET (80MG) BY MOUTH EVERY EVENING INDICATION:TO LOWER CHOLESTEROL AND TO HELP REDUCE RISK OF REOCURRENCE OF HEAR ATTACK STROKE,A    Hyperlipidemia LDL goal <70       clopidogrel 75 MG tablet    PLAVIX    90 tablet    Take 1 tablet (75 mg) by mouth daily    Peripheral vascular disease (H)       denosumab 60 MG/ML Soln injection    PROLIA    1 Syringe    Inject 1 mL (60 mg) Subcutaneous every 6 months INDICATION: TO TREAT OSTEOPOROSIS    Osteoporosis without current pathological fracture, unspecified osteoporosis type       fluticasone-vilanterol 200-25 MCG/INH inhaler    BREO ELLIPTA    3 Inhaler    Inhale 1 puff into the lungs daily INDICATION: COPD CONTROLLER    Chronic obstructive pulmonary disease, unspecified COPD type (H), Tobacco use disorder       lisinopril 40 MG tablet    PRINIVIL/ZESTRIL    90 tablet    Take 1 tablet (40 mg) by mouth daily INDICATION:TO LOWER BLOOD PRESSURE AND TO PRESERVE KIDNEY FUNCTION    Essential hypertension       metoprolol succinate 25 MG 24 hr tablet    TOPROL-XL    90 tablet    Take 1 tablet (25 mg) by mouth daily    Essential hypertension       nitroGLYcerin 0.4  MG sublingual tablet    NITROSTAT    15 tablet    Place 1 tablet (0.4 mg) under the tongue See Admin Instructions for chest pain    Personal history of MI (myocardial infarction)       omeprazole 20 MG CR capsule    priLOSEC    90 capsule    1 capsule daily as needed (not using daily)    Reflux esophagitis       TYLENOL PO      Take 1,000-1,500 mg by mouth every 6 hours as needed for mild pain or fever

## 2018-08-23 NOTE — LETTER
[facility] [place] [address]  [place] [address]  Phone: [phone]  Fax: [phone]      2018    RE: Shirley Hendricks, : 1960      CENTER     Shirley Hendricks returns for vascular follow-up.  She had both abdominal and carotid vascular problems.  An aortobifemoral bypass graft was performed on 3/17/2010.  A left femoral to above-knee popliteal in situ bypass graft was performed during that admission.  She has known occlusion of the right SFA that occurred after angiogram for her carotid disease.  She been relatively asymptomatic in relation to this.       She was found to have a critical stenosis of the right carotid and underwent a CEA on 2016.  We have been following her for her CEA and also moderate asymptomatic left carotid stenosis.     On her last visit on 2/15/2018 she had a decreased XAVIER on the right is 0.54 at rest decreasing to 0.42 with exercise but only mild symptoms.  The left was normal at 1.03 slightly decreased to 0.92 with exercise.  Duplex revealed the bypass graft was widely patent.  Aortobifemoral graft was also widely patent at that time.  Plan follow-up 1 year after that visit.       PMH: Medications: Lisinopril, Toprol-XL, Lipitor, Plavix, Prilosec, aspirin,                                Inhalers as needed       2018 laboratory: Serum creatinine= 0.50   LDL= 94     Social history: , works as a .    She has been relatively asymptomatic with her right leg.  She will occasionally get pain in the thigh but not in the calf.  No problems at all with the left leg.  Still smoking     One quarter pack of cigarettes daily.  We again stressed the importance of quitting.     Exam: Alert and appropriate.  Blood pressure 144/76.  Pulse 56.              Well-healed right carotid incision.               Chest= clear.  Cardiovascular=RR                + 3 femoral pulses bilaterally, left graft and PT pulse                 Right leg=normal   Normal sensation        Duplex with mild right CEA wall thickness.  Stable left ICA EHX=647 cm/s        IMPRESSION:  #1   Stable left carotid stenosis and minimal CEA stenosis.                                #2   Stable PAD.     Check XAVIER, Duplex left graft, Carotid Duplex 9 months.      Sincerely,     Chadwick Lozano MD          [facility]  [address] [place] [address]  Phone: [phone]  Fax: [phone]

## 2018-08-23 NOTE — NURSING NOTE
"Shirley Hendricks is a 57 year old female who presents for:  Chief Complaint   Patient presents with     RECHECK     US carotid (9:30VHC- 10:30WRO) Hx of   Bilateral carotid artery.         Vitals:    Vitals:    08/23/18 1026   BP: 144/76   BP Location: Left arm   Patient Position: Chair   Cuff Size: Adult Regular   Pulse: 56       BMI:  Estimated body mass index is 22.86 kg/(m^2) as calculated from the following:    Height as of 8/7/18: 5' 1.5\" (1.562 m).    Weight as of 8/7/18: 123 lb (55.8 kg).    Pain Score:  Data Unavailable        Sheila Weldon"

## 2018-08-23 NOTE — PROGRESS NOTES
[place] [facility]    Shirley Hendricks returns for vascular follow-up.  She had both abdominal and carotid vascular problems.  An aortobifemoral bypass graft was performed on 3/17/2010.  A left femoral to above-knee popliteal in situ bypass graft was performed during that admission.  She has known occlusion of the right SFA that occurred after angiogram for her carotid disease.  She been relatively asymptomatic in relation to this.      She was found to have a critical stenosis of the right carotid and underwent a CEA on 5/11/2016.  We have been following her for her CEA and also moderate asymptomatic left carotid stenosis.    On her last visit on 2/15/2018 she had a decreased XAVIER on the right is 0.54 at rest decreasing to 0.42 with exercise but only mild symptoms.  The left was normal at 1.03 slightly decreased to 0.92 with exercise.  Duplex revealed the bypass graft was widely patent.  Aortobifemoral graft was also widely patent at that time.  Plan follow-up 1 year after that visit.      PMH: Medications: Lisinopril, Toprol-XL, Lipitor, Plavix, Prilosec, aspirin,                                Inhalers as needed             7/30/2018 laboratory: Serum creatinine= 0.50   LDL= 94    Social history: , works as a .    She has been relatively asymptomatic with her right leg.  She will occasionally get pain in the thigh but not in the calf.  No problems at all with the left leg.  Still smoking    One quarter pack of cigarettes daily.  We again stressed the importance of quitting.    Exam: Alert and appropriate.  Blood pressure 144/76.  Pulse 56.              Well-healed right carotid incision.               Chest= clear.  Cardiovascular=RR                + 3 femoral pulses bilaterally, left graft and PT pulse                Right leg=normal   Normal sensation      Duplex with mild right CEA wall thickness.  Stable left ICA ZBJ=641 cm/s      IMPRESSION:  #1   Stable left carotid stenosis  and minimal CEA stenosis.                               #2   Stable PAD.    Check XAVIER, Duplex left graft, Carotid Duplex 9 months.      Chadwick Lozano MD  Please route or send letter to:  Primary Care Provider (PCP)

## 2018-09-06 NOTE — PROGRESS NOTES
"  SUBJECTIVE:   Shirley Hendricks is a 57 year old female who presents to clinic today for the following health issues:    Chief Complaint   Patient presents with     Follow Up For     8/07/2018 O.V, discuss Prolia Injection     Hypertension     COPD       Pt's past medical history, family history, habits, medications and allergies were reviewed with the patient today.  See snap shot for  HCM status. Most recent lab results reviewed with pt. Problem list and histories reviewed & adjusted, as indicated.  Additional history as below:     Pt seen in f/u re: BP and COPD along with osteoporosis. Restart Breo and Lisinopril 1 month ago  Breathing much better with Breo. Denies chest pain, shortness of breath, abdominal pain, headache, vision changes or side effects with medications.  Still has some  claudication sx with PAD. Recent appt with Dr Lozano reviewed. Still smoking and not willing to quit  Mood  OK except for some frustrations at work. PHQ last month =0.   Rare claudication sx RLE with a lot of walking. Not stopping activity       Additional ROS:   Constitutional, HEENT, Cardiovascular, Pulmonary, GI and , Neuro, MSK and Psych review of systems/symptoms are otherwise negative or unchanged from previous, except as noted above.      OBJECTIVE:  /66  Pulse 54  Temp 97.7  F (36.5  C)  Resp 16  Wt 123 lb (55.8 kg)  SpO2 95%  BMI 22.86 kg/m2   Estimated body mass index is 22.86 kg/(m^2) as calculated from the following:    Height as of 8/7/18: 5' 1.5\" (1.562 m).    Weight as of this encounter: 123 lb (55.8 kg).    HENT: ear canals and TM's normal and nose and mouth without ulcers or lesions   Neck: no adenopathy. Thyroid normal to palpation. No bruits  Pulm: Lungs clear to auscultation   CV: Regular rates and rhythm  GI: Soft, nontender, Normal active bowel sounds, No hepatosplenomegaly or masses palpable  Ext: Peripheral pulses reduced but palpable distal RLE. Normal LLE  No edema.  Neuro: Normal " strength and tone, sensory exam grossly normal    Assessment/Plan: (See plan discussion below for further details)   1. Osteoporosis without current pathological fracture, unspecified osteoporosis type   Prolia injection will be ordered once Mag, Phos results available to review    2. PVD (peripheral vascular disease) (H)  Recent Vascular surgery note reviewed. Continue current therapies and counseled re: smoking    3. Major depression in remission (H)   PHQ=0. Monitor off of meds    4. Chronic obstructive pulmonary disease, unspecified COPD type (H)  Improved with restarting Breo. No wheezing today    5. Tobacco use disorder  See below for counselling    6. Essential hypertension  controlled with meds    7. Special screening for malignant neoplasms, colon   due for colonoscopy  - GASTROENTEROLOGY ADULT REF PROCEDURE ONLY Other; [facility] [phone]    8. Vitamin C deficiency   Now on Vit C supplement. Future lab as ordered    Plan discussion:   MD will order Prolia  once have labs for Phos and Mag done  Nonfasting labs in November re: Vit C  Pt counselled re: smoking cessation.  Pt to contact the clinic if willing to start prescription treatment. Declines at this time  Flu shot in October through [facility] pharmacy  Colonoscopy  with  [facility]. They will call to schedule. If not heard from them in 1 week, then call [phone].  Hold Aspirin and Plavix/Clopidogrel for 1 week prior to the colonoscopy and then restart            Vasquez Benoit MD  Internal Medicine Department  [facility]

## 2018-09-10 ENCOUNTER — OFFICE VISIT (OUTPATIENT)
Dept: INTERNAL MEDICINE | Facility: CLINIC | Age: 58
End: 2018-09-10
Payer: COMMERCIAL

## 2018-09-10 VITALS
WEIGHT: 123 LBS | RESPIRATION RATE: 16 BRPM | SYSTOLIC BLOOD PRESSURE: 126 MMHG | BODY MASS INDEX: 22.86 KG/M2 | OXYGEN SATURATION: 95 % | DIASTOLIC BLOOD PRESSURE: 66 MMHG | HEART RATE: 54 BPM | TEMPERATURE: 97.7 F

## 2018-09-10 DIAGNOSIS — E54 VITAMIN C DEFICIENCY: ICD-10-CM

## 2018-09-10 DIAGNOSIS — M81.0 OSTEOPOROSIS WITHOUT CURRENT PATHOLOGICAL FRACTURE, UNSPECIFIED OSTEOPOROSIS TYPE: ICD-10-CM

## 2018-09-10 DIAGNOSIS — J44.9 CHRONIC OBSTRUCTIVE PULMONARY DISEASE, UNSPECIFIED COPD TYPE (H): ICD-10-CM

## 2018-09-10 DIAGNOSIS — Z12.11 SPECIAL SCREENING FOR MALIGNANT NEOPLASMS, COLON: ICD-10-CM

## 2018-09-10 DIAGNOSIS — F17.200 TOBACCO USE DISORDER: ICD-10-CM

## 2018-09-10 DIAGNOSIS — I73.9 PVD (PERIPHERAL VASCULAR DISEASE) (H): ICD-10-CM

## 2018-09-10 DIAGNOSIS — I10 ESSENTIAL HYPERTENSION: ICD-10-CM

## 2018-09-10 DIAGNOSIS — F32.5 MAJOR DEPRESSION IN REMISSION (H): ICD-10-CM

## 2018-09-10 PROCEDURE — 99214 OFFICE O/P EST MOD 30 MIN: CPT | Performed by: INTERNAL MEDICINE

## 2018-09-10 NOTE — PATIENT INSTRUCTIONS
MD will order Prolia  once have labs for Phos and Mag done  Nonfasting labs in November re: Vit C  Pt counselled re: smoking cessation.  Pt to contact the clinic if willing to start prescription treatment. Declines at this time  Flu shot in October through [facility] pharmacy  Colonoscopy  with  [facility]. They will call to schedule. If not heard from them in 1 week, then call [phone].  Hold Aspirin and Plavix/Clopidogrel for 1 week prior to the colonoscopy and then restart

## 2018-09-12 ENCOUNTER — DOCUMENTATION ONLY (OUTPATIENT)
Dept: LAB | Facility: CLINIC | Age: 58
End: 2018-09-12

## 2018-09-13 DIAGNOSIS — M81.0 OSTEOPOROSIS WITHOUT CURRENT PATHOLOGICAL FRACTURE, UNSPECIFIED OSTEOPOROSIS TYPE: ICD-10-CM

## 2018-09-13 DIAGNOSIS — E54 VITAMIN C DEFICIENCY: ICD-10-CM

## 2018-09-13 LAB
MAGNESIUM SERPL-MCNC: 1.8 MG/DL (ref 1.6–2.3)
PHOSPHATE SERPL-MCNC: 4.3 MG/DL (ref 2.5–4.5)

## 2018-09-13 PROCEDURE — 84100 ASSAY OF PHOSPHORUS: CPT | Performed by: INTERNAL MEDICINE

## 2018-09-13 PROCEDURE — 83735 ASSAY OF MAGNESIUM: CPT | Performed by: INTERNAL MEDICINE

## 2018-09-13 PROCEDURE — 36415 COLL VENOUS BLD VENIPUNCTURE: CPT | Performed by: INTERNAL MEDICINE

## 2018-09-13 PROCEDURE — 99000 SPECIMEN HANDLING OFFICE-LAB: CPT | Performed by: INTERNAL MEDICINE

## 2018-09-13 PROCEDURE — 82180 ASSAY OF ASCORBIC ACID: CPT | Mod: 90 | Performed by: INTERNAL MEDICINE

## 2018-09-14 ENCOUNTER — TELEPHONE (OUTPATIENT)
Dept: NURSING | Facility: CLINIC | Age: 58
End: 2018-09-14

## 2018-09-14 DIAGNOSIS — M81.0 OSTEOPOROSIS WITHOUT CURRENT PATHOLOGICAL FRACTURE, UNSPECIFIED OSTEOPOROSIS TYPE: ICD-10-CM

## 2018-09-16 LAB — VIT C SERPL-MCNC: 120 UMOL/L (ref 23–114)

## 2018-09-17 NOTE — TELEPHONE ENCOUNTER
Prior Authorization Retail Medication Request    Medication/Dose: denosumab (PROLIA) 60 MG/ML SOLN injection  ICD code (if different than what is on RX):  M81.0    Insurance Name:  [employer] OPEN ACCESS   Insurance ID:  [id number]

## 2018-09-23 ENCOUNTER — MYC MEDICAL ADVICE (OUTPATIENT)
Dept: INTERNAL MEDICINE | Facility: CLINIC | Age: 58
End: 2018-09-23

## 2018-09-23 DIAGNOSIS — M81.0 OSTEOPOROSIS, UNSPECIFIED OSTEOPOROSIS TYPE, UNSPECIFIED PATHOLOGICAL FRACTURE PRESENCE: Primary | ICD-10-CM

## 2018-09-27 NOTE — TELEPHONE ENCOUNTER
Received approval.   Left message for pt to call back.  Will order medication today- should be here Tuesday.   Please schedule nurse only after this time.

## 2018-09-28 NOTE — TELEPHONE ENCOUNTER
Left message for pt to return phone call. Please inform message below and help schedule a nurse only appointment.    India Soto, CMA

## 2018-10-04 ENCOUNTER — ALLIED HEALTH/NURSE VISIT (OUTPATIENT)
Dept: NURSING | Facility: CLINIC | Age: 58
End: 2018-10-04
Payer: COMMERCIAL

## 2018-10-04 DIAGNOSIS — M81.0 OSTEOPOROSIS: Primary | ICD-10-CM

## 2018-10-04 PROCEDURE — 96372 THER/PROPH/DIAG INJ SC/IM: CPT

## 2018-10-04 NOTE — NURSING NOTE
The following medication was given:     MEDICATION: Denosumab (Prolia) 60 mg/ml SOLN  ROUTE: SQ  SITE: Deltoid - Left  DOSE: 60 mg   LOT #: [id number]  :  Ramiro  EXPIRATION DATE:  11/20  NDC#: 53792-739-00

## 2018-10-04 NOTE — MR AVS SNAPSHOT
After Visit Summary   10/4/2018    Shirley Hendricks    MRN: [id number]           Patient Information     Date Of Birth          1960        Visit Information        Provider Department      10/4/2018 10:00 AM OX [facility]        Today's Diagnoses     Osteoporosis    -  1       Follow-ups after your visit        Who to contact     If you have questions or need follow up information about today's clinic visit or your schedule please contact [facility] directly at [phone].  Normal or non-critical lab and imaging results will be communicated to you by [employer], letter or phone within 4 business days after the clinic has received the results. If you do not hear from us within 7 days, please contact the clinic through [email or web address] or phone. If you have a critical or abnormal lab result, we will notify you by phone as soon as possible.  Submit refill requests through [employer] or call your pharmacy and they will forward the refill request to us. Please allow 3 business days for your refill to be completed.          Additional Information About Your Visit        MyChart Information     [employer]too gives you secure access to your electronic health record. If you see a primary care provider, you can also send messages to your care team and make appointments. If you have questions, please call your primary care clinic.  If you do not have a primary care provider, please call [phone] and they will assist you.        Care EveryWhere ID     This is your Care EveryWhere ID. This could be used by other organizations to access your [facility] medical records  [id number]         Blood Pressure from Last 3 Encounters:   09/10/18 126/66   08/23/18 144/76   08/07/18 142/84    Weight from Last 3 Encounters:   09/10/18 123 lb (55.8 kg)   08/07/18 123 lb (55.8 kg)   07/30/18 122 lb 8 oz (55.6 kg)              Today, you had the following     No orders found for  display       Primary Care Provider Office Phone # Fax #    Vasquez Benoit [phone]       [address] [facility][1] [address]        Equal Access to Services     DAVIDJERONIMO ALISIA : Hadii adela ku sairao Sokelsieali, waaxda luqadaha, qaybta kaalmada angelica, alesia blandonkacey cerda. So [facility] [phone].    ATENCIÓN: Si habla español, tiene a mahmood disposición servicios gratuitos de asistencia lingüística. Llame al [phone].    We comply with applicable federal civil rights laws and [place] laws. We do not discriminate on the basis of race, color, national origin, age, disability, sex, sexual orientation, or gender identity.            Thank you!     Thank you for choosing [facility]  for your care. Our goal is always to provide you with excellent care. Hearing back from our patients is one way we can continue to improve our services. Please take a few minutes to complete the written survey that you may receive in the mail after your visit with us. Thank you!             Your Updated Medication List - Protect others around you: Learn how to safely use, store and throw away your medicines at www.disposemymeds.org.          This list is accurate as of 10/4/18 10:29 AM.  Always use your most recent med list.                   Brand Name Dispense Instructions for use Diagnosis    albuterol 108 (90 Base) MCG/ACT inhaler    PROAIR HFA/PROVENTIL HFA/VENTOLIN HFA    1 Inhaler    Inhale 1-2 puffs into the lungs every 4 hours as needed for shortness of breath / dyspnea or wheezing    COPD exacerbation (H)       ascorbic acid 500 MG tablet    VITAMIN C     1 tab twice a day    Vitamin C deficiency       ASPIRIN EC PO      Take 81 mg by mouth daily        atorvastatin 80 MG tablet    LIPITOR    90 tablet    TAKE ONE TABLET (80MG) BY MOUTH EVERY EVENING INDICATION:TO LOWER CHOLESTEROL AND TO HELP REDUCE RISK OF REOCURRENCE OF HEAR ATTACK STROKE,A    Hyperlipidemia LDL goal  <70       Calcium Carbonate-Vitamin D3 600-400 MG-UNIT Tabs    CALCIUM 600+D3    180 tablet    Take 1 tablet by mouth 2 times daily    Osteoporosis, unspecified osteoporosis type, unspecified pathological fracture presence       clopidogrel 75 MG tablet    PLAVIX    90 tablet    Take 1 tablet (75 mg) by mouth daily    Peripheral vascular disease (H)       denosumab 60 MG/ML Soln injection    PROLIA    1 Syringe    Inject 1 mL (60 mg) Subcutaneous every 6 months INDICATION: TO TREAT OSTEOPOROSIS    Osteoporosis without current pathological fracture, unspecified osteoporosis type       fluticasone-vilanterol 200-25 MCG/INH inhaler    BREO ELLIPTA    3 Inhaler    Inhale 1 puff into the lungs daily INDICATION: COPD CONTROLLER    Chronic obstructive pulmonary disease, unspecified COPD type (H), Tobacco use disorder       lisinopril 40 MG tablet    PRINIVIL/ZESTRIL    90 tablet    Take 1 tablet (40 mg) by mouth daily INDICATION:TO LOWER BLOOD PRESSURE AND TO PRESERVE KIDNEY FUNCTION    Essential hypertension       metoprolol succinate 25 MG 24 hr tablet    TOPROL-XL    90 tablet    Take 1 tablet (25 mg) by mouth daily    Essential hypertension       nitroGLYcerin 0.4 MG sublingual tablet    NITROSTAT    15 tablet    Place 1 tablet (0.4 mg) under the tongue See Admin Instructions for chest pain    Personal history of MI (myocardial infarction)       omeprazole 20 MG CR capsule    priLOSEC    90 capsule    1 capsule daily as needed (not using daily)    Reflux esophagitis       TYLENOL PO      Take 1,000-1,500 mg by mouth every 6 hours as needed for mild pain or fever

## 2018-10-29 ENCOUNTER — TRANSFERRED RECORDS (OUTPATIENT)
Dept: HEALTH INFORMATION MANAGEMENT | Facility: CLINIC | Age: 58
End: 2018-10-29

## 2018-11-07 ENCOUNTER — TELEPHONE (OUTPATIENT)
Dept: PHARMACY | Facility: OTHER | Age: 58
End: 2018-11-07

## 2018-11-07 NOTE — TELEPHONE ENCOUNTER
MTM referral from: [facility] recruitment    MTM referral outreach attempt #1 on November 7, 2018 at 3:57 PM      Outcome: Left Message    April Serna, MTM Coordinator Intern

## 2018-11-08 ENCOUNTER — TELEPHONE (OUTPATIENT)
Dept: INTERNAL MEDICINE | Facility: CLINIC | Age: 58
End: 2018-11-08

## 2018-11-08 NOTE — TELEPHONE ENCOUNTER
PCP please advise of Plavix hold instructions prior to procedure.     Appointment is not yet scheduled.    Simona KHAN RN, BSN, PHN

## 2018-11-08 NOTE — TELEPHONE ENCOUNTER
No procedures in the past year  that limit Plavix from being held. OK to hold Plavix 5 days prior to procedure per GI request. Inform pt and [facility]. Pt to restart Plavix after procedure as previously taking

## 2018-11-08 NOTE — TELEPHONE ENCOUNTER
Reason for Call:  Other call back    Detailed comments: colonoscopy with polyp removal: approval for PT to hold the plavix 5 days before procedure  Phone Number Patient can be reached at: Other phone number:  Call [facility] at tele [phone] opt 4 any rep    Best Time: asap    Can we leave a detailed message on this number?     Call taken on 11/8/2018 at 1:46 PM by Michelle Santiago

## 2018-11-19 ENCOUNTER — HOSPITAL ENCOUNTER (OUTPATIENT)
Facility: CLINIC | Age: 58
End: 2018-11-19
Attending: INTERNAL MEDICINE | Admitting: INTERNAL MEDICINE
Payer: COMMERCIAL

## 2018-12-05 NOTE — TELEPHONE ENCOUNTER
MTM referral from: [facility] recruitment    MTM referral outreach attempt #2 on December 5, 2018 at 1:47 PM      Outcome: Spoke with patient and she is not interested at this time.     April Serna, MTM Coordinator Intern

## 2019-01-20 ENCOUNTER — HOSPITAL ENCOUNTER (EMERGENCY)
Facility: CLINIC | Age: 59
Discharge: HOME | End: 2019-01-20
Attending: EMERGENCY MEDICINE | Admitting: EMERGENCY MEDICINE
Payer: COMMERCIAL

## 2019-01-20 VITALS
SYSTOLIC BLOOD PRESSURE: 156 MMHG | HEART RATE: 55 BPM | RESPIRATION RATE: 18 BRPM | DIASTOLIC BLOOD PRESSURE: 84 MMHG | OXYGEN SATURATION: 96 % | TEMPERATURE: 97.5 F | BODY MASS INDEX: 23.6 KG/M2 | HEIGHT: 61 IN | WEIGHT: 125 LBS

## 2019-01-20 DIAGNOSIS — F17.210 CONTINUOUS DEPENDENCE ON CIGARETTE SMOKING: ICD-10-CM

## 2019-01-20 DIAGNOSIS — I10 HYPERTENSION, UNSPECIFIED TYPE: ICD-10-CM

## 2019-01-20 LAB
ANION GAP SERPL CALCULATED.3IONS-SCNC: 7 MMOL/L (ref 3–14)
BASOPHILS # BLD AUTO: 0.1 10E9/L (ref 0–0.2)
BASOPHILS NFR BLD AUTO: 1 %
BUN SERPL-MCNC: 9 MG/DL (ref 7–30)
CALCIUM SERPL-MCNC: 8.3 MG/DL (ref 8.5–10.1)
CHLORIDE SERPL-SCNC: 106 MMOL/L (ref 94–109)
CO2 SERPL-SCNC: 24 MMOL/L (ref 20–32)
CREAT SERPL-MCNC: 0.48 MG/DL (ref 0.52–1.04)
DIFFERENTIAL METHOD BLD: NORMAL
EOSINOPHIL # BLD AUTO: 0.1 10E9/L (ref 0–0.7)
EOSINOPHIL NFR BLD AUTO: 1.8 %
ERYTHROCYTE [DISTWIDTH] IN BLOOD BY AUTOMATED COUNT: 13.8 % (ref 10–15)
GFR SERPL CREATININE-BSD FRML MDRD: >90 ML/MIN/{1.73_M2}
GLUCOSE SERPL-MCNC: 81 MG/DL (ref 70–99)
HCT VFR BLD AUTO: 42 % (ref 35–47)
HGB BLD-MCNC: 14.2 G/DL (ref 11.7–15.7)
IMM GRANULOCYTES # BLD: 0 10E9/L (ref 0–0.4)
IMM GRANULOCYTES NFR BLD: 0.2 %
LYMPHOCYTES # BLD AUTO: 1.4 10E9/L (ref 0.8–5.3)
LYMPHOCYTES NFR BLD AUTO: 21.8 %
MCH RBC QN AUTO: 31.1 PG (ref 26.5–33)
MCHC RBC AUTO-ENTMCNC: 33.8 G/DL (ref 31.5–36.5)
MCV RBC AUTO: 92 FL (ref 78–100)
MONOCYTES # BLD AUTO: 0.4 10E9/L (ref 0–1.3)
MONOCYTES NFR BLD AUTO: 5.7 %
NEUTROPHILS # BLD AUTO: 4.3 10E9/L (ref 1.6–8.3)
NEUTROPHILS NFR BLD AUTO: 69.5 %
NRBC # BLD AUTO: 0 10*3/UL
NRBC BLD AUTO-RTO: 0 /100
PLATELET # BLD AUTO: 183 10E9/L (ref 150–450)
POTASSIUM SERPL-SCNC: 4.4 MMOL/L (ref 3.4–5.3)
RBC # BLD AUTO: 4.57 10E12/L (ref 3.8–5.2)
SODIUM SERPL-SCNC: 137 MMOL/L (ref 133–144)
WBC # BLD AUTO: 6.2 10E9/L (ref 4–11)

## 2019-01-20 PROCEDURE — 25000128 H RX IP 250 OP 636: Performed by: EMERGENCY MEDICINE

## 2019-01-20 PROCEDURE — 80048 BASIC METABOLIC PNL TOTAL CA: CPT | Performed by: EMERGENCY MEDICINE

## 2019-01-20 PROCEDURE — 93005 ELECTROCARDIOGRAM TRACING: CPT

## 2019-01-20 PROCEDURE — 96361 HYDRATE IV INFUSION ADD-ON: CPT

## 2019-01-20 PROCEDURE — 99284 EMERGENCY DEPT VISIT MOD MDM: CPT | Mod: 25

## 2019-01-20 PROCEDURE — 96374 THER/PROPH/DIAG INJ IV PUSH: CPT

## 2019-01-20 PROCEDURE — 85025 COMPLETE CBC W/AUTO DIFF WBC: CPT | Performed by: EMERGENCY MEDICINE

## 2019-01-20 RX ORDER — LABETALOL HYDROCHLORIDE 5 MG/ML
20 INJECTION, SOLUTION INTRAVENOUS ONCE
Status: COMPLETED | OUTPATIENT
Start: 2019-01-20 | End: 2019-01-20

## 2019-01-20 RX ORDER — HYDRALAZINE HYDROCHLORIDE 20 MG/ML
10 INJECTION INTRAMUSCULAR; INTRAVENOUS ONCE
Status: DISCONTINUED | OUTPATIENT
Start: 2019-01-20 | End: 2019-01-20

## 2019-01-20 RX ADMIN — SODIUM CHLORIDE, POTASSIUM CHLORIDE, SODIUM LACTATE AND CALCIUM CHLORIDE 1000 ML: 600; 310; 30; 20 INJECTION, SOLUTION INTRAVENOUS at 11:47

## 2019-01-20 RX ADMIN — LABETALOL HYDROCHLORIDE 20 MG: 5 INJECTION INTRAVENOUS at 11:49

## 2019-01-20 ASSESSMENT — ENCOUNTER SYMPTOMS: HEADACHES: 0

## 2019-01-20 ASSESSMENT — MIFFLIN-ST. JEOR: SCORE: 1074.38

## 2019-01-20 NOTE — ED PROVIDER NOTES
"  History     Chief Complaint:  Hypertension     HPI   Shirley Hendricks is a 58 year old female with history of hypertension, hyperlipidemia, MI, Lupus and PAD who presents to the emergency department today for evaluation of hypertension.The patient reports reports for the past couple of days she has felt \"off.\" She says earlier this morning while walking to her bathroom she felt like she was \"drunk.\"   she took her blood pressure at home and noticed it was 200/95, proceeded to take her blood pressure medication and it lowered to 156/83, and then checked once more finding it to be 195/96. Due to these symptoms she presented to the emergency department today. Additionally, she sees Dr. Benoit from internal medicine now but used to see Dr. Narvaez whom prescribed the Prolia injections. She denies headaches, and leg swelling. Of note, she is on Plavix. No vision changes. No chest pain or shortness of breath. No fever or recent infectious symptoms. She reports she otherwise feels well.         Allergies:  Contrast Dye  Pantoprazole  Penicillins        Medications:    Acetaminophen (TYLENOL PO)  albuterol (PROAIR HFA/PROVENTIL HFA/VENTOLIN HFA) 108 (90 Base) MCG/ACT Inhaler  ascorbic acid (VITAMIN C) 500 MG tablet  ASPIRIN EC PO  atorvastatin (LIPITOR) 80 MG tablet  Calcium Carbonate-Vitamin D3 (CALCIUM 600+D3) 600-400 MG-UNIT TABS  clopidogrel (PLAVIX) 75 MG tablet  denosumab (PROLIA) 60 MG/ML SOLN injection  fluticasone-vilanterol (BREO ELLIPTA) 200-25 MCG/INH oral inhaler  lisinopril (PRINIVIL/ZESTRIL) 40 MG tablet  metoprolol succinate (TOPROL-XL) 25 MG 24 hr tablet  omeprazole (PRILOSEC) 20 MG CR capsule        Past Medical History:    Anxiety  COPD  Discoid lupus erythematosus of eyelid  Embolism and thrombosis of unspecified artery  Gastroesophageal reflux  Hyperlipidemia  Hypertension  Lupus  Depression  MI  Osteoarthrosis  PAD  Peripheral vascular disease  Post menopausal  Reflux esophagitis  Asthma  Vitamin C " "deficiency      Past Surgical History:    Angiogram  C fabric wrapping of abdominal aneurysm  Angioplasty with stent  Sinus surgery  Emergent left groin exploration with over sewing of bleeding angiographic site  Occluded left common iliac and external iliac arteries revascularized with stenting  Cardia catherization  Endarterectomy carotid  Left salpingectomy  Laparoscopic cholecystectomy  Left knee surgery  Vascular surgery      Family History:    Bladder cancer  MI  Clotting disorder      Social History:  The patient was accompanied to the ED by friend.  Smoking Status: Current Every Day Smoker  Smokeless Tobacco: Never Used  Alcohol Use: Yes   Marital Status:   [2]       Review of Systems   Cardiovascular: Negative for leg swelling.   Neurological: Negative for headaches.   All other systems reviewed and are negative.      Physical Exam   First Vitals:  BP: 168/89  Pulse: 57  Temp: 97.5  F (36.4  C)  Resp: 18  Height: 154.9 cm (5' 1\")  Weight: 56.7 kg (125 lb)  SpO2: 99 %      Physical Exam  General: Well appearing, nontoxic.  Resting comfortably. Smells of cigarette smoke.  Head:  Scalp, face, and head appear normal  Eyes:  Pupils are equal, round, and reactive to light    Conjunctivae non-injected and sclerae white  ENT:    The external nose is normal    Pinnae are normal    The oropharynx is normal, mucous membranes dry    Uvula is in the midline  Neck:  Normal range of motion    There is no rigidity noted    Trachea is in the midline  CV:  Regular rate and rhythm     Normal S1/S2, no S3/S4    No murmur or rub. Radial pulses 2+ bilaterally   Resp:  Lungs are clear and equal bilaterally    There is no tachypnea    No increased work of breathing    No rales, wheezing, or rhonchi  GI:  Abdomen is soft, no rigidity or guarding    No distension, or mass    No tenderness or rebound tenderness   MS:  Normal muscular tone    Symmetric motor strength    No lower extremity edema. No calf swelling or tenderness. "   Skin:  No rash or acute skin lesions noted  Neuro: Awake and alert. No facial droop.    Strength grossly intact.    Speech is normal and fluent    Moves all extremities spontaneously  Psych:  Normal affect.  Appropriate interactions.     Emergency Department Course     ECG:  Indication: hypertension   Completed at 1116.  Read at 1121  Sinus bradycardia. Septal infarct, age undetermined. Abnormal ECG.  Rate 53 bpm. VA interval 152. QRS duration 84. QT/QTc 432/405. P-R-T axes 53 49 71.   No significant change from previous ECG 5/10/16.      Laboratory:  Laboratory findings were communicated with the patient who voiced understanding of the findings.    CBC: WBC 6.2, HGB 14.2,   BMP: Creatinine 0.48 (L), Calcium 8.3 (L) o/w WNL       Interventions:  1147 NS Bolus 1,000mL IV   1149 Labetalol 20 mg IV       Emergency Department Course:  Nursing notes and vitals reviewed.  1115 I performed an exam of the patient as documented above.   EKG obtained in the ED, see results above.    IV was inserted and blood was drawn for laboratory testing, results above.   1200 Patient rechecked and updated.    Findings and plan explained to the Patient. Patient discharged home with instructions regarding supportive care, medications, and reasons to return. The importance of close follow-up was reviewed.  I personally reviewed the laboratory  results with the Patient and answered all related questions prior to  discharge.    Impression & Plan      Medical Decision Making:  Shirley Hendricks is a 58 year old female who presents for evaluation of elevated blood pressure. The patient has a history of hypertension in the past and is on medication. The patient does not have any clinical signs or symptoms of end organ dysfunction including renal failure, stroke, vision loss, pulmonary edema, congestive heart failure, cardiac ischemia or encephalopathy. Given the lack of end organ dysfunction and according to the American College of  Emergency Physicians 2013 Clinical Policy [Lauren Emerg Med. 2013;62:59-68] there is no indication for emergent testing or emergent medical intervention at this time. The patient continues to smoke. I discussed with her the impact of this on her hypertension and performed smoking cessation counseling. Supportive outpatient management is therefore indicated with close follow-up of primary care physician. The patient was urged to record blood pressure readings at home to bring to their next PCP visit to aid in decision making regarding hypertension. Return precautions were discussed with patient. The patient's questions were answered and the patient was agreeable with discharge.      Diagnosis:    ICD-10-CM    1. Continuous dependence on cigarette smoking F17.210    2. Hypertension, unspecified type I10        Disposition:  Discharged to home.     Scribe Disclosure:  I, Elvira De La Cruz, am serving as a scribe at 11:18 AM on 1/20/2019 to document services personally performed by Izaiah Hu MD based on my observations and the provider's statements to me.    Elvira De La Cruz  1/20/2019    EMERGENCY DEPARTMENT       Izaiah Hu MD  01/21/19 1032

## 2019-01-21 ENCOUNTER — OFFICE VISIT (OUTPATIENT)
Dept: INTERNAL MEDICINE | Facility: CLINIC | Age: 59
End: 2019-01-21
Payer: COMMERCIAL

## 2019-01-21 VITALS
HEART RATE: 51 BPM | SYSTOLIC BLOOD PRESSURE: 150 MMHG | OXYGEN SATURATION: 99 % | BODY MASS INDEX: 23.56 KG/M2 | WEIGHT: 124.7 LBS | TEMPERATURE: 97.9 F | DIASTOLIC BLOOD PRESSURE: 70 MMHG | RESPIRATION RATE: 16 BRPM

## 2019-01-21 DIAGNOSIS — I10 ESSENTIAL HYPERTENSION: Primary | ICD-10-CM

## 2019-01-21 LAB — INTERPRETATION ECG - MUSE: NORMAL

## 2019-01-21 PROCEDURE — 99214 OFFICE O/P EST MOD 30 MIN: CPT | Performed by: INTERNAL MEDICINE

## 2019-01-21 RX ORDER — LISINOPRIL AND HYDROCHLOROTHIAZIDE 12.5; 2 MG/1; MG/1
1 TABLET ORAL 2 TIMES DAILY
Qty: 60 TABLET | Refills: 3 | Status: ON HOLD | OUTPATIENT
Start: 2019-01-21 | End: 2019-02-02

## 2019-01-21 NOTE — PATIENT INSTRUCTIONS
- Stop taking plain lisinopril.  - Start taking lisinopril/HCTZ twice daily.  (Prescription has been sent to your pharmacy)    - Please follow-up with Dr. Benoit in clinic in 1 month.  - Please come in for non-fasting labs, a few days prior to the appointment with Dr. Benoit.

## 2019-01-21 NOTE — PROGRESS NOTES
SUBJECTIVE:   Shirley Hendricks is a 58 year old female who presents to clinic today for the following health issues:      Hypertension Follow-up from ER 1.20.19      Pt is having headaches, dizziness, and lightheaded    BP still high: Results are 208/107 this am at 5:05 before mediations, took at 8:30 164/73.  200/99 yesterday at 8:30am, took pills 158, check hour later at 10:30 185/86 on both arms.     Problem list and histories reviewed & adjusted, as indicated.  Additional history: as documented    Patient has had elevated blood pressures to as high as 200 systolic as measured at home.  Prompted ER visit yesterday where without specific therapy her blood pressure improved to the 160s systolic and she was discharged.  Here today for follow-up, today's blood pressure noted.  Continues on lisinopril 40 mg daily and low-dose metoprolol.  Current pulse rate noted.    Basic metabolic panel in the ER yesterday was completely normal.  She has not been on diuretic therapy to her knowledge.    Reviewed and updated as needed this visit by clinical staff       Reviewed and updated as needed this visit by Provider         ROS:  Constitutional, HEENT, cardiovascular, pulmonary, GI, , musculoskeletal, neuro, skin, endocrine and psych systems are negative, except as otherwise noted.    OBJECTIVE:     /70   Pulse 51   Temp 97.9  F (36.6  C) (Oral)   Resp 16   Wt 56.6 kg (124 lb 11.2 oz)   SpO2 99%   BMI 23.56 kg/m    Body mass index is 23.56 kg/m .  GENERAL: healthy, alert and no distress  NECK: no adenopathy, no asymmetry, masses, or scars and thyroid normal to palpation  RESP: lungs clear to auscultation - no rales, rhonchi or wheezes  CV: regular rate and rhythm, normal S1 S2, no S3 or S4, no murmur, click or rub, no peripheral edema and peripheral pulses strong  ABDOMEN: soft, nontender, no hepatosplenomegaly, no masses and bowel sounds normal  MS: no gross musculoskeletal defects noted, no  edema        ASSESSMENT/PLAN:     1. Essential hypertension  Suboptimally controlled.  Will stop once daily plan lisinopril, replace with twice daily lisinopril/HCTZ.  Follow-up with PCP in 1 month, with repeat surveillance labs a few days prior, to include thyroid function and urinalysis.  - lisinopril-hydrochlorothiazide (PRINZIDE/ZESTORETIC) 20-12.5 MG tablet; Take 1 tablet by mouth 2 times daily  Dispense: 60 tablet; Refill: 3  - TSH with free T4 reflex; Future  - Basic metabolic panel; Future  - UA with Microscopic reflex to Culture; Future    See Patient Instructions    Héctor Heard MD  [facility]

## 2019-01-24 ENCOUNTER — TELEPHONE (OUTPATIENT)
Dept: INTERNAL MEDICINE | Facility: CLINIC | Age: 59
End: 2019-01-24

## 2019-01-24 DIAGNOSIS — I10 ESSENTIAL HYPERTENSION: Primary | ICD-10-CM

## 2019-01-24 RX ORDER — AMLODIPINE BESYLATE 5 MG/1
5 TABLET ORAL DAILY
Qty: 30 TABLET | Refills: 11 | Status: SHIPPED | OUTPATIENT
Start: 2019-01-24 | End: 2019-02-01

## 2019-01-24 NOTE — TELEPHONE ENCOUNTER
Pt to continue current blood pressure meds recently ordered by Dr Heard as it can sometimes take a while for them to fully have full effect and add Amlodipine 5mg tab, 1 tab daily in AM for blood pressure. New Rx faxed to pt's local pharmacy in chart. Pt to see me as scheduled in February

## 2019-01-24 NOTE — TELEPHONE ENCOUNTER
Pt calling and states started new blood pressure medication Tues . BP still running 160's -180's even after medication more than an hour and diastolic . Only one /79. Please advise if just continue medication or if to adjust.Nohemy Thompson RN

## 2019-02-01 ENCOUNTER — APPOINTMENT (OUTPATIENT)
Dept: CT IMAGING | Facility: CLINIC | Age: 59
End: 2019-02-01
Payer: COMMERCIAL

## 2019-02-01 ENCOUNTER — HOSPITAL ENCOUNTER (OUTPATIENT)
Facility: CLINIC | Age: 59
Setting detail: OBSERVATION
Discharge: HOME | End: 2019-02-02
Attending: EMERGENCY MEDICINE | Admitting: INTERNAL MEDICINE
Payer: COMMERCIAL

## 2019-02-01 DIAGNOSIS — R19.7 VOMITING AND DIARRHEA: ICD-10-CM

## 2019-02-01 DIAGNOSIS — I10 ESSENTIAL HYPERTENSION: Primary | ICD-10-CM

## 2019-02-01 DIAGNOSIS — R11.10 VOMITING AND DIARRHEA: ICD-10-CM

## 2019-02-01 DIAGNOSIS — E87.1 HYPONATREMIA: ICD-10-CM

## 2019-02-01 DIAGNOSIS — R10.12 LUQ ABDOMINAL PAIN: ICD-10-CM

## 2019-02-01 LAB
ALBUMIN SERPL-MCNC: 4.3 G/DL (ref 3.4–5)
ALBUMIN UR-MCNC: NEGATIVE MG/DL
ALP SERPL-CCNC: 63 U/L (ref 40–150)
ALT SERPL W P-5'-P-CCNC: 75 U/L (ref 0–50)
ANION GAP SERPL CALCULATED.3IONS-SCNC: 12 MMOL/L (ref 3–14)
APPEARANCE UR: CLEAR
AST SERPL W P-5'-P-CCNC: 39 U/L (ref 0–45)
BACTERIA #/AREA URNS HPF: ABNORMAL /HPF
BASOPHILS # BLD AUTO: 0.1 10E9/L (ref 0–0.2)
BASOPHILS NFR BLD AUTO: 0.6 %
BILIRUB SERPL-MCNC: 0.6 MG/DL (ref 0.2–1.3)
BILIRUB UR QL STRIP: NEGATIVE
BUN SERPL-MCNC: 24 MG/DL (ref 7–30)
CALCIUM SERPL-MCNC: 9.1 MG/DL (ref 8.5–10.1)
CHLORIDE SERPL-SCNC: 85 MMOL/L (ref 94–109)
CO2 SERPL-SCNC: 23 MMOL/L (ref 20–32)
COLOR UR AUTO: ABNORMAL
CREAT SERPL-MCNC: 0.74 MG/DL (ref 0.52–1.04)
DIFFERENTIAL METHOD BLD: ABNORMAL
EOSINOPHIL # BLD AUTO: 0.1 10E9/L (ref 0–0.7)
EOSINOPHIL NFR BLD AUTO: 1 %
ERYTHROCYTE [DISTWIDTH] IN BLOOD BY AUTOMATED COUNT: 12.3 % (ref 10–15)
GFR SERPL CREATININE-BSD FRML MDRD: 88 ML/MIN/{1.73_M2}
GLUCOSE BLDC GLUCOMTR-MCNC: 110 MG/DL (ref 70–99)
GLUCOSE BLDC GLUCOMTR-MCNC: 143 MG/DL (ref 70–99)
GLUCOSE BLDC GLUCOMTR-MCNC: 78 MG/DL (ref 70–99)
GLUCOSE BLDC GLUCOMTR-MCNC: 89 MG/DL (ref 70–99)
GLUCOSE SERPL-MCNC: 62 MG/DL (ref 70–99)
GLUCOSE UR STRIP-MCNC: NEGATIVE MG/DL
HCT VFR BLD AUTO: 45 % (ref 35–47)
HGB BLD-MCNC: 16.4 G/DL (ref 11.7–15.7)
HGB UR QL STRIP: NEGATIVE
IMM GRANULOCYTES # BLD: 0 10E9/L (ref 0–0.4)
IMM GRANULOCYTES NFR BLD: 0.2 %
KETONES UR STRIP-MCNC: 40 MG/DL
LEUKOCYTE ESTERASE UR QL STRIP: NEGATIVE
LIPASE SERPL-CCNC: 101 U/L (ref 73–393)
LYMPHOCYTES # BLD AUTO: 1.4 10E9/L (ref 0.8–5.3)
LYMPHOCYTES NFR BLD AUTO: 15.8 %
MCH RBC QN AUTO: 31.5 PG (ref 26.5–33)
MCHC RBC AUTO-ENTMCNC: 36.4 G/DL (ref 31.5–36.5)
MCV RBC AUTO: 86 FL (ref 78–100)
MONOCYTES # BLD AUTO: 0.7 10E9/L (ref 0–1.3)
MONOCYTES NFR BLD AUTO: 7.7 %
NEUTROPHILS # BLD AUTO: 6.5 10E9/L (ref 1.6–8.3)
NEUTROPHILS NFR BLD AUTO: 74.7 %
NITRATE UR QL: NEGATIVE
NRBC # BLD AUTO: 0 10*3/UL
NRBC BLD AUTO-RTO: 0 /100
PH UR STRIP: 5 PH (ref 5–7)
PLATELET # BLD AUTO: 196 10E9/L (ref 150–450)
POTASSIUM SERPL-SCNC: 4.7 MMOL/L (ref 3.4–5.3)
PROT SERPL-MCNC: 8 G/DL (ref 6.8–8.8)
RBC # BLD AUTO: 5.21 10E12/L (ref 3.8–5.2)
RBC #/AREA URNS AUTO: <1 /HPF (ref 0–2)
SODIUM SERPL-SCNC: 120 MMOL/L (ref 133–144)
SODIUM SERPL-SCNC: 121 MMOL/L (ref 133–144)
SODIUM SERPL-SCNC: 124 MMOL/L (ref 133–144)
SODIUM SERPL-SCNC: 126 MMOL/L (ref 133–144)
SODIUM SERPL-SCNC: 127 MMOL/L (ref 133–144)
SOURCE: ABNORMAL
SP GR UR STRIP: 1.01 (ref 1–1.03)
SQUAMOUS #/AREA URNS AUTO: <1 /HPF (ref 0–1)
UROBILINOGEN UR STRIP-MCNC: NORMAL MG/DL (ref 0–2)
WBC # BLD AUTO: 8.7 10E9/L (ref 4–11)
WBC #/AREA URNS AUTO: 1 /HPF (ref 0–5)

## 2019-02-01 PROCEDURE — 25000128 H RX IP 250 OP 636: Performed by: EMERGENCY MEDICINE

## 2019-02-01 PROCEDURE — 74176 CT ABD & PELVIS W/O CONTRAST: CPT

## 2019-02-01 PROCEDURE — 99220 ZZC INITIAL OBSERVATION CARE,LEVL III: CPT | Performed by: PHYSICIAN ASSISTANT

## 2019-02-01 PROCEDURE — 36415 COLL VENOUS BLD VENIPUNCTURE: CPT | Performed by: PHYSICIAN ASSISTANT

## 2019-02-01 PROCEDURE — 25000128 H RX IP 250 OP 636

## 2019-02-01 PROCEDURE — 96374 THER/PROPH/DIAG INJ IV PUSH: CPT

## 2019-02-01 PROCEDURE — 25000128 H RX IP 250 OP 636: Performed by: PHYSICIAN ASSISTANT

## 2019-02-01 PROCEDURE — 85025 COMPLETE CBC W/AUTO DIFF WBC: CPT | Performed by: EMERGENCY MEDICINE

## 2019-02-01 PROCEDURE — 81001 URINALYSIS AUTO W/SCOPE: CPT | Performed by: EMERGENCY MEDICINE

## 2019-02-01 PROCEDURE — 00000146 ZZHCL STATISTIC GLUCOSE BY METER IP

## 2019-02-01 PROCEDURE — 25000125 ZZHC RX 250: Performed by: EMERGENCY MEDICINE

## 2019-02-01 PROCEDURE — G0378 HOSPITAL OBSERVATION PER HR: HCPCS

## 2019-02-01 PROCEDURE — 80053 COMPREHEN METABOLIC PANEL: CPT | Performed by: EMERGENCY MEDICINE

## 2019-02-01 PROCEDURE — 96376 TX/PRO/DX INJ SAME DRUG ADON: CPT

## 2019-02-01 PROCEDURE — 84295 ASSAY OF SERUM SODIUM: CPT | Performed by: EMERGENCY MEDICINE

## 2019-02-01 PROCEDURE — 96361 HYDRATE IV INFUSION ADD-ON: CPT

## 2019-02-01 PROCEDURE — 25000132 ZZH RX MED GY IP 250 OP 250 PS 637: Performed by: PHYSICIAN ASSISTANT

## 2019-02-01 PROCEDURE — 99285 EMERGENCY DEPT VISIT HI MDM: CPT | Mod: 25

## 2019-02-01 PROCEDURE — 83690 ASSAY OF LIPASE: CPT | Performed by: EMERGENCY MEDICINE

## 2019-02-01 PROCEDURE — 25000132 ZZH RX MED GY IP 250 OP 250 PS 637: Performed by: EMERGENCY MEDICINE

## 2019-02-01 PROCEDURE — 84295 ASSAY OF SERUM SODIUM: CPT | Mod: 91 | Performed by: PHYSICIAN ASSISTANT

## 2019-02-01 RX ORDER — ACETAMINOPHEN 650 MG/1
650 SUPPOSITORY RECTAL EVERY 4 HOURS PRN
Status: DISCONTINUED | OUTPATIENT
Start: 2019-02-01 | End: 2019-02-02 | Stop reason: HOSPADM

## 2019-02-01 RX ORDER — PROCHLORPERAZINE MALEATE 10 MG
10 TABLET ORAL EVERY 6 HOURS PRN
Status: DISCONTINUED | OUTPATIENT
Start: 2019-02-01 | End: 2019-02-02 | Stop reason: HOSPADM

## 2019-02-01 RX ORDER — NITROGLYCERIN 0.4 MG/1
0.4 TABLET SUBLINGUAL EVERY 5 MIN PRN
Status: DISCONTINUED | OUTPATIENT
Start: 2019-02-01 | End: 2019-02-02 | Stop reason: HOSPADM

## 2019-02-01 RX ORDER — ACETAMINOPHEN 325 MG/1
650 TABLET ORAL EVERY 4 HOURS PRN
Status: DISCONTINUED | OUTPATIENT
Start: 2019-02-01 | End: 2019-02-02 | Stop reason: HOSPADM

## 2019-02-01 RX ORDER — CLOPIDOGREL BISULFATE 75 MG/1
75 TABLET ORAL DAILY
Status: DISCONTINUED | OUTPATIENT
Start: 2019-02-01 | End: 2019-02-02 | Stop reason: HOSPADM

## 2019-02-01 RX ORDER — PROCHLORPERAZINE 25 MG
25 SUPPOSITORY, RECTAL RECTAL EVERY 12 HOURS PRN
Status: DISCONTINUED | OUTPATIENT
Start: 2019-02-01 | End: 2019-02-02 | Stop reason: HOSPADM

## 2019-02-01 RX ORDER — NALOXONE HYDROCHLORIDE 0.4 MG/ML
.1-.4 INJECTION, SOLUTION INTRAMUSCULAR; INTRAVENOUS; SUBCUTANEOUS
Status: DISCONTINUED | OUTPATIENT
Start: 2019-02-01 | End: 2019-02-02 | Stop reason: HOSPADM

## 2019-02-01 RX ORDER — LANOLIN ALCOHOL/MO/W.PET/CERES
3 CREAM (GRAM) TOPICAL
Status: DISCONTINUED | OUTPATIENT
Start: 2019-02-01 | End: 2019-02-02 | Stop reason: HOSPADM

## 2019-02-01 RX ORDER — ONDANSETRON 2 MG/ML
4 INJECTION INTRAMUSCULAR; INTRAVENOUS ONCE
Status: COMPLETED | OUTPATIENT
Start: 2019-02-01 | End: 2019-02-01

## 2019-02-01 RX ORDER — ONDANSETRON 4 MG/1
4 TABLET, ORALLY DISINTEGRATING ORAL EVERY 6 HOURS PRN
Status: DISCONTINUED | OUTPATIENT
Start: 2019-02-01 | End: 2019-02-02 | Stop reason: HOSPADM

## 2019-02-01 RX ORDER — ONDANSETRON 2 MG/ML
INJECTION INTRAMUSCULAR; INTRAVENOUS
Status: COMPLETED
Start: 2019-02-01 | End: 2019-02-01

## 2019-02-01 RX ORDER — LISINOPRIL 20 MG/1
20 TABLET ORAL 2 TIMES DAILY
Status: DISCONTINUED | OUTPATIENT
Start: 2019-02-01 | End: 2019-02-02 | Stop reason: HOSPADM

## 2019-02-01 RX ORDER — ONDANSETRON 2 MG/ML
4 INJECTION INTRAMUSCULAR; INTRAVENOUS EVERY 6 HOURS PRN
Status: DISCONTINUED | OUTPATIENT
Start: 2019-02-01 | End: 2019-02-02 | Stop reason: HOSPADM

## 2019-02-01 RX ORDER — ASPIRIN 81 MG/1
81 TABLET ORAL DAILY
Status: DISCONTINUED | OUTPATIENT
Start: 2019-02-01 | End: 2019-02-02 | Stop reason: HOSPADM

## 2019-02-01 RX ORDER — SODIUM CHLORIDE 9 MG/ML
INJECTION, SOLUTION INTRAVENOUS CONTINUOUS
Status: DISCONTINUED | OUTPATIENT
Start: 2019-02-01 | End: 2019-02-01

## 2019-02-01 RX ORDER — NICOTINE POLACRILEX 4 MG
15-30 LOZENGE BUCCAL
Status: DISCONTINUED | OUTPATIENT
Start: 2019-02-01 | End: 2019-02-02 | Stop reason: HOSPADM

## 2019-02-01 RX ORDER — AMOXICILLIN 250 MG
1 CAPSULE ORAL 2 TIMES DAILY PRN
Status: DISCONTINUED | OUTPATIENT
Start: 2019-02-01 | End: 2019-02-02 | Stop reason: HOSPADM

## 2019-02-01 RX ORDER — LIDOCAINE 40 MG/G
CREAM TOPICAL
Status: DISCONTINUED | OUTPATIENT
Start: 2019-02-01 | End: 2019-02-02 | Stop reason: HOSPADM

## 2019-02-01 RX ORDER — SODIUM CHLORIDE 9 MG/ML
INJECTION, SOLUTION INTRAVENOUS ONCE
Status: COMPLETED | OUTPATIENT
Start: 2019-02-01 | End: 2019-02-01

## 2019-02-01 RX ORDER — METOPROLOL SUCCINATE 25 MG/1
25 TABLET, EXTENDED RELEASE ORAL DAILY
Status: DISCONTINUED | OUTPATIENT
Start: 2019-02-01 | End: 2019-02-02 | Stop reason: HOSPADM

## 2019-02-01 RX ORDER — NICOTINE 21 MG/24HR
1 PATCH, TRANSDERMAL 24 HOURS TRANSDERMAL DAILY
Status: DISCONTINUED | OUTPATIENT
Start: 2019-02-01 | End: 2019-02-02 | Stop reason: HOSPADM

## 2019-02-01 RX ORDER — DEXTROSE MONOHYDRATE 25 G/50ML
25-50 INJECTION, SOLUTION INTRAVENOUS
Status: DISCONTINUED | OUTPATIENT
Start: 2019-02-01 | End: 2019-02-02 | Stop reason: HOSPADM

## 2019-02-01 RX ORDER — HYDRALAZINE HYDROCHLORIDE 20 MG/ML
10 INJECTION INTRAMUSCULAR; INTRAVENOUS EVERY 4 HOURS PRN
Status: DISCONTINUED | OUTPATIENT
Start: 2019-02-01 | End: 2019-02-02 | Stop reason: HOSPADM

## 2019-02-01 RX ORDER — AMOXICILLIN 250 MG
2 CAPSULE ORAL 2 TIMES DAILY PRN
Status: DISCONTINUED | OUTPATIENT
Start: 2019-02-01 | End: 2019-02-02 | Stop reason: HOSPADM

## 2019-02-01 RX ADMIN — ONDANSETRON 4 MG: 2 INJECTION INTRAMUSCULAR; INTRAVENOUS at 08:50

## 2019-02-01 RX ADMIN — OMEPRAZOLE 20 MG: 20 CAPSULE, DELAYED RELEASE ORAL at 14:13

## 2019-02-01 RX ADMIN — SODIUM CHLORIDE 1000 ML: 9 INJECTION, SOLUTION INTRAVENOUS at 08:51

## 2019-02-01 RX ADMIN — ASPIRIN 81 MG: 81 TABLET, COATED ORAL at 14:13

## 2019-02-01 RX ADMIN — ACETAMINOPHEN 650 MG: 325 TABLET, FILM COATED ORAL at 20:17

## 2019-02-01 RX ADMIN — METOPROLOL SUCCINATE 25 MG: 25 TABLET, EXTENDED RELEASE ORAL at 14:12

## 2019-02-01 RX ADMIN — SODIUM CHLORIDE: 9 INJECTION, SOLUTION INTRAVENOUS at 11:18

## 2019-02-01 RX ADMIN — LIDOCAINE HYDROCHLORIDE 30 ML: 20 SOLUTION ORAL; TOPICAL at 09:09

## 2019-02-01 RX ADMIN — NICOTINE 1 PATCH: 14 PATCH, EXTENDED RELEASE TRANSDERMAL at 14:13

## 2019-02-01 RX ADMIN — ACETAMINOPHEN 650 MG: 325 TABLET, FILM COATED ORAL at 14:16

## 2019-02-01 RX ADMIN — ONDANSETRON 4 MG: 2 INJECTION INTRAMUSCULAR; INTRAVENOUS at 10:17

## 2019-02-01 RX ADMIN — CLOPIDOGREL BISULFATE 75 MG: 75 TABLET ORAL at 14:13

## 2019-02-01 RX ADMIN — SODIUM CHLORIDE 75 ML/HR: 9 INJECTION, SOLUTION INTRAVENOUS at 19:22

## 2019-02-01 RX ADMIN — LISINOPRIL 20 MG: 20 TABLET ORAL at 19:17

## 2019-02-01 RX ADMIN — SODIUM CHLORIDE: 9 INJECTION, SOLUTION INTRAVENOUS at 14:05

## 2019-02-01 ASSESSMENT — ENCOUNTER SYMPTOMS
DIZZINESS: 1
APPETITE CHANGE: 1
HEMATURIA: 0
FEVER: 0
RHINORRHEA: 0
VOMITING: 1
DYSURIA: 0
NAUSEA: 1
COUGH: 0
DIARRHEA: 0
LIGHT-HEADEDNESS: 1
SHORTNESS OF BREATH: 0
ABDOMINAL PAIN: 0

## 2019-02-01 ASSESSMENT — MIFFLIN-ST. JEOR
SCORE: 1058.49
SCORE: 1055.77

## 2019-02-01 NOTE — PROGRESS NOTES
RECEIVING UNIT ED HANDOFF REVIEW    ED Nurse Handoff Report was reviewed by: Alexia Rodriguez on February 1, 2019 at 12:32 PM

## 2019-02-01 NOTE — ED NOTES
"[facility]  ED Nurse Handoff Report    ED Chief complaint: Nausea & Vomiting (vomiting for 3 days)      ED Diagnosis:   Final diagnoses:   Hyponatremia   Vomiting and diarrhea   LUQ abdominal pain       Code Status: Full Code    Allergies:   Allergies   Allergen Reactions     Contrast Dye Anaphylaxis     RASH, FACIAL AND NECK SWELLING, SOB, WHEEZING     Pantoprazole      Protonix caused diffuse edema     Penicillins Itching       Activity level - Baseline/Home:  Independent    Activity Level - Current:   Independent     Needed?: No    Isolation: No  Infection: Not Applicable  Bariatric?: No    Vital Signs:   Vitals:    02/01/19 0826   BP: 151/86   Pulse: 70   Resp: 16   Temp: 97.9  F (36.6  C)   TempSrc: Oral   SpO2: 99%   Weight: 52.6 kg (116 lb)   Height: 1.585 m (5' 2.4\")       Cardiac Rhythm: ,        Pain level:      Is this patient confused?: No   Does this patient have a guardian?  No         If yes, is there guardianship documents in the Epic \"Code/ACP\" activity?  N/A         Guardian Notified?  N/A  [place] - Suicide Severity Rating Scale Completed?  Yes  If yes, what color did the patient score?  White    Patient Report: Initial Complaint: nausea vomiting  Focused Assessment: Shirley Hendricks is a 58 year old female with a history of MI, stroke, hypertension, hyperlipidemia, PAD, and PVD who presents to the emergency department for evaluation of nausea and vomiting. The patient has had nausea and nonbloody vomiting for the last two days. She has been unable to keep any solids or liquids. She denies any abdominal pain or diarrhea. She has had associated lightheadedness and dizziness. This morning she developed a headache and neck pain that has remained unrelieved with Tylenol. She presents to the emergency department for evaluation of her worsening symptoms.   Tests Performed: labs, CT  Abnormal Results:   Labs Ordered and Resulted from Time of ED Arrival Up to the Time of " Departure from the ED   CBC WITH PLATELETS DIFFERENTIAL - Abnormal; Notable for the following components:       Result Value    RBC Count 5.21 (*)     Hemoglobin 16.4 (*)     All other components within normal limits   COMPREHENSIVE METABOLIC PANEL - Abnormal; Notable for the following components:    Sodium 120 (*)     Chloride 85 (*)     Glucose 62 (*)     ALT 75 (*)     All other components within normal limits   SODIUM - Abnormal; Notable for the following components:    Sodium 121 (*)     All other components within normal limits   LIPASE   ROUTINE UA WITH MICROSCOPIC   PERIPHERAL IV CATHETER   PATIENT CARE ORDER   PATIENT CARE ORDER     Treatments provided: Zofran, NS bolus    Family Comments: son at bedside    OBS brochure/video discussed/provided to patient/family: Yes              Name of person given brochure if not patient: na              Relationship to patient: na    ED Medications:   Medications   0.9% sodium chloride BOLUS (0 mLs Intravenous Stopped 2/1/19 1027)   ondansetron (ZOFRAN) injection 4 mg (4 mg Intravenous Given 2/1/19 0850)   lidocaine VISCOUS (XYLOCAINE) 2 % 15 mL, alum & mag hydroxide-simethicone (MYLANTA ES/MAALOX  ES) 15 mL GI Cocktail (30 mLs Oral Given 2/1/19 0909)   sodium chloride 0.9% infusion ( Intravenous New Bag 2/1/19 1118)   ondansetron (ZOFRAN) injection 4 mg (4 mg Intravenous Given 2/1/19 1017)       Drips infusing?:  No    For the majority of the shift this patient was Green.   Interventions performed were none.    Severe Sepsis OR Septic Shock Diagnosis Present: No    To be done/followed up on inpatient unit:  continue to monitor sodium level    ED NURSE PHONE NUMBER: *[id number]

## 2019-02-01 NOTE — PROGRESS NOTES
Received page regarding patient sodium of 127 , will discontinue fluid at this time, will follow up on next BMP , if sodium continues to go high then might need to start her on D5 to prevent further increase in sodium .  Felicia Gonsales MD,FACP

## 2019-02-01 NOTE — PROVIDER NOTIFICATION
MD Notification    Notified Person: MD    Notified Person Name: Ilda    Notification Date/Time: 02/01/19 1514    Notification Interaction: text page    Purpose of Notification: Na 127. Up from 121 at 1030    Orders Received: none at this time/awaiting callback

## 2019-02-01 NOTE — ED PROVIDER NOTES
History     Chief Complaint:  Nausea and vomiting     HPI:   The history is provided by the patient.      Shirley Hendricks is a 58 year old female with a history of MI, stroke, hypertension, hyperlipidemia, PAD, and PVD who presents to the emergency department for evaluation of nausea and vomiting. The patient has had nausea and nonbloody vomiting for the last two days. She has been unable to keep any solids or liquids. She denies any abdominal pain or diarrhea. She has had associated lightheadedness and dizziness. This morning she developed a headache and neck pain that has remained unrelieved with Tylenol. She presents to the emergency department for evaluation of her worsening symptoms. Here, the patient denies any dysuria, hematuria, cough, rhinorrhea, shortness of breath, or fever. Of note, she is scheduled to obtain a colonoscopy today but was not able to drink the prep.     On chart review her recent BP medication changes included initiation of hydrochlorothiazide in the past 2 weeks.    Allergies:  Contrast Dye - anaphylaxis   Pantoprazole - rash, facial and neck swelling, shortness of breath, wheezing  Penicillins - itching      Medications:    Albuterol inhaler   Amlodipine   Aspirin   Atorvastatin   Plavix 75 mg   Prolia   Breo ellipta   Lisinopril - hydrochlorothiazide   Metoprolol succinate   Nitroglycerin   Omeprazole     Past Medical History:    Anxiety   Tobacco use disorder  Chronic allergic rhinitis   COPD  Bilateral coronary artery stenosis   Discoid lupus erythematosus of eyelid   Embolism and thrombosis of unspecified artery   GERD  Chronic pain syndrome   DDD lumbar   Hyperlipidemia  Hypertension   Depression   Stroke   MI   Osteoarthritis   PAD  PVD  Post menopausal   Asthma   Vitamin C deficiency   Vitamin D deficiency   CAD    Past Surgical History:    Angiogram   Fabric wrapping of abdominal aneurysm   Angioplasty with stent right fem artery   Sinus surgery   Endarterectomy carotid  "right   Left salpingectomy   Laparoscopic cholecystectomy   Left knee surgery   Vascular surgery     Family History:    Mother - bladder cancer  Father - cardiovascular   Brother - clotting disorder     Social History:  Presents alone    Tobacco use: 0.25 PPD   Alcohol use: Occasional   PCP: Vas[facility] Benoit    Marital Status:        Review of Systems   Constitutional: Positive for appetite change. Negative for fever.   HENT: Negative for rhinorrhea.    Respiratory: Negative for cough and shortness of breath.    Gastrointestinal: Positive for nausea and vomiting. Negative for abdominal pain and diarrhea.   Genitourinary: Negative for dysuria and hematuria.   Neurological: Positive for dizziness and light-headedness.   All other systems reviewed and are negative.      Physical Exam     Patient Vitals for the past 24 hrs:   BP Temp Temp src Pulse Resp SpO2 Height Weight   02/01/19 0826 151/86 97.9  F (36.6  C) Oral 70 16 99 % 1.585 m (5' 2.4\") 52.6 kg (116 lb)        Physical Exam  Eyes:               Sclera white; Pupils are equal and round  ENT:                External ears and nares normal  CV:                  Rate as above with regular rhythm   Resp:               Breath sounds clear and equal bilaterally                          Non-labored, no retractions or accessory muscle use  GI:                   Abdomen is soft, LUQ tenderness that surprises patient, no distension                          No rebound tenderness or peritoneal features  MS:                  Moves all extremities  Skin:                Warm and dry  Neuro:             Speech is normal and fluent. No apparent deficit.    Emergency Department Course     Imaging:  Radiographic findings were communicated with the patient who voiced understanding of the findings.     CT Abd/pelvis without contrast:  IMPRESSION: No acute abnormality.    Imaging independently reviewed and agree with radiologist interpretation.      Laboratory:  I personally " reviewed the laboratory results with the Patient and answered all related questions prior to admission.  CBC: HGB 16.4 (H), ow WNL (WBC 8.7, )      0845: CMP: Sodium 120 (L), Chloride 85 (L), Glucose 62 (L), ALT 75 (H), ow WNL (Creatinine 0.74)   1026: Sodium 121 (L)   Lipase: 101   UA with Microscopic: Ketone 40, Bacteria few, ow Negative     Interventions:  0850: Zofran 4 mg IV   0851: NS 1L IV Bolus    0909: GI Cocktail - Maalox 15 mL, Viscous Lidocaine 15 mL, 30 mL suspension PO    1017: Zofran 4 mg IV      Emergency Department Course:  Past medical records, nursing notes, and vitals reviewed.  0828: I performed an exam of the patient and obtained history, as documented above.     IV inserted and blood drawn. This was sent to the lab for further testing, results above.   Above interventions provided.      0915: I rechecked the patient. Explained findings to patient.    The patient provided a urine sample here in the emergency department. This was sent for laboratory testing, findings above.     1107: Findings and plan explained to the Patient who consents to admission.     1128: Discussed the patient with Dr. Gonsales, who will admit the patient to a medical bed for further monitoring, evaluation, and treatment.         Impression & Plan      Medical Decision Making:  The patient is here for evaluation of vomiting and dizziness. I am worried for dehydration with underlying electrolyte abnormalities and renal failure. She has left upper quadrant abdominal tenderness which did not improve with Zofran and a GI cocktail. Due to concern for intra abdominal infection, CT scan was obtained. This was not found. Work up is remarkable for a sodium of 120. While this may be secondary to dehydration, it did not improve with her first liter of fluids. Due to the risk of neurologic complications with rapid reversal of hyponatremia, fluid infusion was ordered after the identification of this abnormality and admission to  the hospital was arranged.  Recent introduction of hydrochlorothiazide may be contributing as well.    Diagnosis:    ICD-10-CM    1. Hyponatremia E87.1 UA with Microscopic   2. Vomiting and diarrhea R11.10     R19.7    3. LUQ abdominal pain R10.12        Disposition:  Admitted to Dr. Gonsales for further evaluation and care.      Scribe Disclosure:   Guillermo IRAHETA, am serving as a scribe at 8:28 AM on 2/1/2019 to document services personally performed by Leigh Schmid MD based on my observations and the provider's statements to me.       Leigh Schmid MD  2/1/2019    EMERGENCY DEPARTMENT       Leigh Schmid MD  02/02/19 0625

## 2019-02-01 NOTE — PHARMACY-ADMISSION MEDICATION HISTORY
Admission medication history interview status for the 2/1/2019  admission is complete. See EPIC admission navigator for prior to admission medications     Medication history source reliability:Good    Actions taken by pharmacist (provider contacted, etc):None     Additional medication history information not noted on PTA med list :None    Medication reconciliation/reorder completed by provider prior to medication history? No    Time spent in this activity: 15min    Prior to Admission medications    Medication Sig Last Dose Taking? Auth Provider   Acetaminophen (TYLENOL PO) Take 1,000-1,500 mg by mouth every 6 hours as needed for mild pain or fever   Yes Reported, Patient   albuterol (PROAIR HFA/PROVENTIL HFA/VENTOLIN HFA) 108 (90 Base) MCG/ACT Inhaler Inhale 1-2 puffs into the lungs every 4 hours as needed for shortness of breath / dyspnea or wheezing  Yes Elvia Campbell MD   ascorbic acid (VITAMIN C) 500 MG tablet 1 tab twice a day 1/30/2019 Yes Vasquez Benoit MD   ASPIRIN EC PO Take 81 mg by mouth daily 1/30/2019 Yes Reported, Patient   atorvastatin (LIPITOR) 80 MG tablet TAKE ONE TABLET (80MG) BY MOUTH EVERY EVENING INDICATION:TO LOWER CHOLESTEROL AND TO HELP REDUCE RISK OF REOCURRENCE OF HEAR ATTACK STROKE,A 1/30/2019 Yes Vasquez Benoit MD   Calcium Carbonate-Vitamin D3 (CALCIUM 600+D3) 600-400 MG-UNIT TABS Take 1 tablet by mouth 2 times daily 1/30/2019 Yes Vasquez Benoit MD   clopidogrel (PLAVIX) 75 MG tablet Take 1 tablet (75 mg) by mouth daily 1/30/2019 Yes Vasquez Benoit MD   denosumab (PROLIA) 60 MG/ML SOLN injection Inject 1 mL (60 mg) Subcutaneous every 6 months INDICATION: TO TREAT OSTEOPOROSIS 7/18/2017 Yes Vasquez Benoit MD   fluticasone-vilanterol (BREO ELLIPTA) 200-25 MCG/INH oral inhaler Inhale 1 puff into the lungs daily INDICATION: COPD CONTROLLER 1/30/2019 Yes Vasquez Benoit MD   lisinopril-hydrochlorothiazide (PRINZIDE/ZESTORETIC) 20-12.5 MG tablet Take 1 tablet by mouth 2 times daily 1/30/2019  Yes Héctor Heard MD   metoprolol succinate (TOPROL-XL) 25 MG 24 hr tablet Take 1 tablet (25 mg) by mouth daily 1/30/2019 Yes Vasquez Benoit MD   nitroGLYcerin (NITROSTAT) 0.4 MG sublingual tablet Place 1 tablet (0.4 mg) under the tongue See Admin Instructions for chest pain  Yes Eun Narvaez MD   omeprazole (PRILOSEC) 20 MG CR capsule 1 capsule daily as needed (not using daily)  Yes Vasquez Benoit MD

## 2019-02-01 NOTE — PLAN OF CARE
OBS GOALS    -diagnostic tests completed and resulted: NOT MET  -vital signs normal or at patient baseline: MET  -tolerating oral intake to maintain hydration: NOT MET  -adequate pain control on oral analgesics: MET  -returns to baseline functional status: NOT MET  -Sodium level improved >131: NOT MET, last check 127    Arrived to unit 1330. A&Ox4. VSS on RA. Tolerating clears and shasha crackers. Denies nausea currently, able to take daily meds PO. Headache controlled w/ tylenol. IVF infusing. Vitals, blood glucose, sodium checks q 4 hrs. Last sodium (1421): 127. Continue to monitor.

## 2019-02-02 VITALS
WEIGHT: 118 LBS | HEART RATE: 54 BPM | RESPIRATION RATE: 16 BRPM | SYSTOLIC BLOOD PRESSURE: 111 MMHG | HEIGHT: 62 IN | OXYGEN SATURATION: 99 % | DIASTOLIC BLOOD PRESSURE: 53 MMHG | TEMPERATURE: 96.2 F | BODY MASS INDEX: 21.71 KG/M2

## 2019-02-02 LAB
ALBUMIN SERPL-MCNC: 3.2 G/DL (ref 3.4–5)
ALP SERPL-CCNC: 46 U/L (ref 40–150)
ALT SERPL W P-5'-P-CCNC: 52 U/L (ref 0–50)
ANION GAP SERPL CALCULATED.3IONS-SCNC: 4 MMOL/L (ref 3–14)
ANION GAP SERPL CALCULATED.3IONS-SCNC: 6 MMOL/L (ref 3–14)
AST SERPL W P-5'-P-CCNC: 28 U/L (ref 0–45)
BILIRUB SERPL-MCNC: 0.5 MG/DL (ref 0.2–1.3)
BUN SERPL-MCNC: 10 MG/DL (ref 7–30)
BUN SERPL-MCNC: 12 MG/DL (ref 7–30)
CALCIUM SERPL-MCNC: 7.7 MG/DL (ref 8.5–10.1)
CALCIUM SERPL-MCNC: 8 MG/DL (ref 8.5–10.1)
CHLORIDE SERPL-SCNC: 96 MMOL/L (ref 94–109)
CHLORIDE SERPL-SCNC: 97 MMOL/L (ref 94–109)
CO2 SERPL-SCNC: 24 MMOL/L (ref 20–32)
CO2 SERPL-SCNC: 28 MMOL/L (ref 20–32)
CREAT SERPL-MCNC: 0.44 MG/DL (ref 0.52–1.04)
CREAT SERPL-MCNC: 0.49 MG/DL (ref 0.52–1.04)
GFR SERPL CREATININE-BSD FRML MDRD: >90 ML/MIN/{1.73_M2}
GFR SERPL CREATININE-BSD FRML MDRD: >90 ML/MIN/{1.73_M2}
GLUCOSE BLDC GLUCOMTR-MCNC: 123 MG/DL (ref 70–99)
GLUCOSE BLDC GLUCOMTR-MCNC: 65 MG/DL (ref 70–99)
GLUCOSE BLDC GLUCOMTR-MCNC: 81 MG/DL (ref 70–99)
GLUCOSE SERPL-MCNC: 100 MG/DL (ref 70–99)
GLUCOSE SERPL-MCNC: 72 MG/DL (ref 70–99)
POTASSIUM SERPL-SCNC: 4.2 MMOL/L (ref 3.4–5.3)
POTASSIUM SERPL-SCNC: 4.6 MMOL/L (ref 3.4–5.3)
PROT SERPL-MCNC: 6.1 G/DL (ref 6.8–8.8)
SODIUM SERPL-SCNC: 126 MMOL/L (ref 133–144)
SODIUM SERPL-SCNC: 127 MMOL/L (ref 133–144)
SODIUM SERPL-SCNC: 129 MMOL/L (ref 133–144)

## 2019-02-02 PROCEDURE — 00000146 ZZHCL STATISTIC GLUCOSE BY METER IP

## 2019-02-02 PROCEDURE — 36415 COLL VENOUS BLD VENIPUNCTURE: CPT | Performed by: PHYSICIAN ASSISTANT

## 2019-02-02 PROCEDURE — 25000132 ZZH RX MED GY IP 250 OP 250 PS 637: Performed by: PHYSICIAN ASSISTANT

## 2019-02-02 PROCEDURE — 99207 ZZC MOONLIGHTING INDICATOR: CPT | Performed by: PHYSICIAN ASSISTANT

## 2019-02-02 PROCEDURE — 99207 ZZC CDG-CODE CATEGORY CHANGED: CPT | Performed by: PHYSICIAN ASSISTANT

## 2019-02-02 PROCEDURE — 99217 ZZC OBSERVATION CARE DISCHARGE: CPT | Performed by: PHYSICIAN ASSISTANT

## 2019-02-02 PROCEDURE — 25000128 H RX IP 250 OP 636: Performed by: PHYSICIAN ASSISTANT

## 2019-02-02 PROCEDURE — 84295 ASSAY OF SERUM SODIUM: CPT | Performed by: PHYSICIAN ASSISTANT

## 2019-02-02 PROCEDURE — 80053 COMPREHEN METABOLIC PANEL: CPT | Performed by: PHYSICIAN ASSISTANT

## 2019-02-02 PROCEDURE — G0378 HOSPITAL OBSERVATION PER HR: HCPCS

## 2019-02-02 PROCEDURE — 80048 BASIC METABOLIC PNL TOTAL CA: CPT | Performed by: PHYSICIAN ASSISTANT

## 2019-02-02 RX ORDER — SODIUM CHLORIDE 9 MG/ML
INJECTION, SOLUTION INTRAVENOUS ONCE
Status: COMPLETED | OUTPATIENT
Start: 2019-02-02 | End: 2019-02-02

## 2019-02-02 RX ORDER — SODIUM CHLORIDE 9 MG/ML
INJECTION, SOLUTION INTRAVENOUS CONTINUOUS
Status: DISCONTINUED | OUTPATIENT
Start: 2019-02-02 | End: 2019-02-02 | Stop reason: HOSPADM

## 2019-02-02 RX ORDER — LISINOPRIL 20 MG/1
20 TABLET ORAL 2 TIMES DAILY
Qty: 60 TABLET | Refills: 0
Start: 2019-02-02 | End: 2019-02-28

## 2019-02-02 RX ADMIN — ACETAMINOPHEN 650 MG: 325 TABLET, FILM COATED ORAL at 13:40

## 2019-02-02 RX ADMIN — NICOTINE 1 PATCH: 14 PATCH, EXTENDED RELEASE TRANSDERMAL at 08:07

## 2019-02-02 RX ADMIN — METOPROLOL SUCCINATE 25 MG: 25 TABLET, EXTENDED RELEASE ORAL at 08:07

## 2019-02-02 RX ADMIN — OMEPRAZOLE 20 MG: 20 CAPSULE, DELAYED RELEASE ORAL at 06:50

## 2019-02-02 RX ADMIN — ACETAMINOPHEN 650 MG: 325 TABLET, FILM COATED ORAL at 04:23

## 2019-02-02 RX ADMIN — ASPIRIN 81 MG: 81 TABLET, COATED ORAL at 08:07

## 2019-02-02 RX ADMIN — LISINOPRIL 20 MG: 20 TABLET ORAL at 08:07

## 2019-02-02 RX ADMIN — CLOPIDOGREL BISULFATE 75 MG: 75 TABLET ORAL at 08:07

## 2019-02-02 RX ADMIN — SODIUM CHLORIDE: 9 INJECTION, SOLUTION INTRAVENOUS at 08:15

## 2019-02-02 NOTE — PROGRESS NOTES
PRIMARY DIAGNOSIS: GASTROENTERITIS     OUTPATIENT/OBSERVATION GOALS TO BE MET BEFORE DISCHARGE  1. Orthostatic performed: No.  N/A.      2. Tolerating PO fluid and/or antibiotics (if applicable): Yes.  Met.  Tolerating regular diet.      3. Nausea/Vomiting/Diarrhea symptoms improved: Yes.  Met.  No N/V.      4. Pain status: Improved-controlled with oral pain medications.     5. Return to near baseline physical activity: Yes. Met.  Up IND.       Waiting for sodium level to correct. Na+ =126 this morning.  Waiting for 1200 sodium check to be drawn and resulted.

## 2019-02-02 NOTE — PLAN OF CARE
RN:  Patient A/O x4.  VSS on RA.  Blood sugar stable.  Up IND.   Sodium level up to 129 at time of discharge.  Patient encouraged to log b/p's  and follow-up with Dr. Benoit this week.  Patient agreeable to discharge plans in place.  She gives verbal understanding that she needs to cut her current dose of lisinospril in half and take 20mg 2x a day.  She gives verbal understanding that she needs to stop taking her hydrochlorothiazide pills.  She is aware there is no other changes to her home medication plans. Patient spoke to the PA  prior to her discharge regarding when to call her primary MD with concerns about b/p at home.  Patient tolerating her diet.  Able to call and state needs.  AVS instructions reviewed with the patient prior to the discharge with copy sent home with her.

## 2019-02-02 NOTE — DISCHARGE SUMMARY
[facility]  Hospitalist Discharge Summary       Date of Admission:  2/1/2019  Date of Discharge:  2/2/2019  Discharging Provider: Nelly Aden PA-C      Discharge Diagnoses   1. Hyponatremia  2. Gastroenteritis  3. Peripheral Vascular Disease  4. Tobacco use  5. COPD/asthma  6. Anxiety and Depression  7. Discoid lupus erythematosus of eyelid.   8. GERD  9. Hypertension  10. Hyperlipidemia      Follow-ups Needed After Discharge   Follow-up Appointments     Follow-up and recommended labs and tests       Follow up with primary care provider, Vasquez Benoit, within 7 days to evaluate medication change and for hospital follow- up.  The following labs/tests are recommended: BMP (kidney function test and sodium evaluation) to be done on 2/4/19. Results to be followed by PCP. Please bring in your blood pressure diary to your appointment.           Please monitor sodium levels and blood pressure.    Unresulted Labs Ordered in the Past 30 Days of this Admission     No orders found for last 61 day(s).      These results will be followed up by [facility]    Shirley Hendricks is a pleasant 58-year-old female with a past medical history of MI, stroke, hypertension, hyperlipidemia and peripheral arterial disease who presented to the Emergency Department for evaluation of approximately 3 days of nausea and vomiting.  She was found to be hyponatremic and was admitted to the observation unit for further treatment.     1.  Hyponatremia.  This is secondary to gastrointestinal losses and no p.o. intake the last few days.  Sodium on arrival to ED was 120.  She was treated with intermittent IV normal saline to avoid overcorrection. Values have been [phone].  Discussed options at length with patient. She wants to discharge. She feels that she has returned to her baseline. She has no neurologic deficits and her mentation is intact. Agreeable to get BMP on Monday morning at PCPs office.     2.   Nausea and vomiting, presumed to be due to viral gastroenteritis.  Lipase is normal and CT abdomen and pelvis does not show any signs for any pain or symptoms.  ALT mildly elevated at 75 an downward trending to 52.  No fever or leukocytosis.      3.  Coronary artery disease, peripheral vascular disease, hypertension, hyperlipidemia.  Continue PTA aspirin and Plavix as there is no evidence of bleeding.  Resume lisinopril and metoprolol with holding parameters. HCTZ was held/discontinued given hyponatremia.     4.  Gastroesophageal reflux disease.  Continue with prior to admission omeprazole.      5.  Chronic obstructive pulmonary disease.  Not in an acute exacerbation. Resume PTA Breo Ellipta.        6.  Tobacco use disorder.  The patient is smoking half a pack of cigarettes per day. received nicotine patch while in observation.  Counseled on smoking cessation but not ready to quit at this time.        Consultations This Hospital Stay   ADVANCE DIRECTIVE IP CONSULT    Code Status   Full Code    Time Spent on this Encounter   I, Nelly Aden, personally saw the patient today and spent greater than 30 minutes discharging this patient.       Nelly Aden, COLE  [facility]  ______________________________________________________________________    Physical Exam   Vital Signs: Temp: 96.2  F (35.7  C) Temp src: Oral BP: 111/53 Pulse: 54   Resp: 16 SpO2: 99 % O2 Device: None (Room air)    Weight: 118 lbs 0 oz  General Appearance: A&Ox3, NAD, resting comfortably in bed  Respiratory: CTA no wheezing or accessory muscle use  Cardiovascular: RRR s1 s2 no murmurs  GI: soft, NT, ND, BS present  Skin: warm and dry  Other: PARVIN        Primary Care Physician   Vasquez Benoit    Discharge Disposition   Discharged to home  Condition at discharge: Stable    Significant Results and Procedures   Results for orders placed or performed during the hospital encounter of 02/01/19   CT Abdomen Pelvis w/o Contrast     Narrative    CT ABDOMEN AND PELVIS WITHOUT CONTRAST  2/1/2019 9:35 AM     INDICATION: Abdominal pain, diverticulitis suspected. Left upper  quadrant pain, vomiting, no stool output, allergic to contrast dye.      TECHNIQUE: Thin axial images through the abdomen and pelvis without  contrast. Coronal reformatted images. Radiation dose for this scan was  reduced using automated exposure control, adjustment of the mA and/or  kV according to patient size, or iterative reconstruction technique.    COMPARISON: 9/4/2005.    FINDINGS: No urinary stones or hydronephrosis. Renal calcifications  are likely all vascular. Gallbladder is absent. Upper abdominal organs  demonstrate no worrisome findings without contrast. Moderate vascular  calcification. Aortoiliac bypass graft.    Visualized lung bases are clear.    Uterus is present. No suspicious pelvic masses. Few colonic  diverticula. No diverticulitis or other focal inflammatory changes  demonstrated. No bowel obstruction, ascites or free air. There are a  few small and borderline prominent nonspecific inguinal lymph nodes,  greater on the right. Largest right inguinal node measures 1.2 cm in  short axis dimension. These were also present previously.      Impression    IMPRESSION: No acute abnormality.    BRYAN CASEY MD       Discharge Orders      Reason for your hospital stay    Gastroenteritis causing low sodium levels     Activity    Your activity upon discharge: activity as tolerated     Follow-up and recommended labs and tests     Follow up with primary care provider, Vasquez Benoit, within 7 days to evaluate medication change and for hospital follow- up.  The following labs/tests are recommended: BMP (kidney function test and sodium evaluation) to be done on 2/4/19. Results to be followed by PCP. Please bring in your blood pressure diary to your appointment.     Full Code     Diet    Follow this diet upon discharge: Orders Placed This Encounter      Advance Diet  as Tolerated: Regular Diet Adult     Discharge Medications   Current Discharge Medication List      START taking these medications    Details   lisinopril (PRINIVIL/ZESTRIL) 20 MG tablet Take 1 tablet (20 mg) by mouth 2 times daily  Qty: 60 tablet, Refills: 0    Comments: Patient to split 40 mg tablets she has at home.  Associated Diagnoses: Essential hypertension         CONTINUE these medications which have NOT CHANGED    Details   Acetaminophen (TYLENOL PO) Take 1,000-1,500 mg by mouth every 6 hours as needed for mild pain or fever       albuterol (PROAIR HFA/PROVENTIL HFA/VENTOLIN HFA) 108 (90 Base) MCG/ACT Inhaler Inhale 1-2 puffs into the lungs every 4 hours as needed for shortness of breath / dyspnea or wheezing  Qty: 1 Inhaler, Refills: 0    Associated Diagnoses: COPD exacerbation (H)      ascorbic acid (VITAMIN C) 500 MG tablet 1 tab twice a day    Associated Diagnoses: Vitamin C deficiency      ASPIRIN EC PO Take 81 mg by mouth daily      atorvastatin (LIPITOR) 80 MG tablet TAKE ONE TABLET (80MG) BY MOUTH EVERY EVENING INDICATION:TO LOWER CHOLESTEROL AND TO HELP REDUCE RISK OF REOCURRENCE OF HEAR ATTACK STROKE,A  Qty: 90 tablet, Refills: 3    Associated Diagnoses: Hyperlipidemia LDL goal <70      Calcium Carbonate-Vitamin D3 (CALCIUM 600+D3) 600-400 MG-UNIT TABS Take 1 tablet by mouth 2 times daily  Qty: 180 tablet, Refills: 3    Associated Diagnoses: Osteoporosis, unspecified osteoporosis type, unspecified pathological fracture presence      clopidogrel (PLAVIX) 75 MG tablet Take 1 tablet (75 mg) by mouth daily  Qty: 90 tablet, Refills: 2    Associated Diagnoses: Peripheral vascular disease (H)      denosumab (PROLIA) 60 MG/ML SOLN injection Inject 1 mL (60 mg) Subcutaneous every 6 months INDICATION: TO TREAT OSTEOPOROSIS  Qty: 1 Syringe, Refills: 0    Associated Diagnoses: Osteoporosis without current pathological fracture, unspecified osteoporosis type      fluticasone-vilanterol (BREO ELLIPTA) 200-25  MCG/INH oral inhaler Inhale 1 puff into the lungs daily INDICATION: COPD CONTROLLER  Qty: 3 Inhaler, Refills: 3    Associated Diagnoses: Chronic obstructive pulmonary disease, unspecified COPD type (H); Tobacco use disorder      metoprolol succinate (TOPROL-XL) 25 MG 24 hr tablet Take 1 tablet (25 mg) by mouth daily  Qty: 90 tablet, Refills: 3    Associated Diagnoses: Essential hypertension      nitroGLYcerin (NITROSTAT) 0.4 MG sublingual tablet Place 1 tablet (0.4 mg) under the tongue See Admin Instructions for chest pain  Qty: 15 tablet, Refills: 7    Associated Diagnoses: Personal history of MI (myocardial infarction)      omeprazole (PRILOSEC) 20 MG CR capsule 1 capsule daily as needed (not using daily)  Qty: 90 capsule, Refills: 3    Associated Diagnoses: Reflux esophagitis         STOP taking these medications       lisinopril-hydrochlorothiazide (PRINZIDE/ZESTORETIC) 20-12.5 MG tablet Comments:   Reason for Stopping:             Allergies   Allergies   Allergen Reactions     Contrast Dye Anaphylaxis     RASH, FACIAL AND NECK SWELLING, SOB, WHEEZING     Pantoprazole      Protonix caused diffuse edema     Penicillins Itching       Discussed with RN and Dr. Grider. Patient instructed on signs and symptoms of hyponatremia. She wants to discharge today as she feels back to baseline. She is aware of the importance to follow up for any new symptoms and the need to have a BMP on Monday 2/4/2019.

## 2019-02-02 NOTE — PROVIDER NOTIFICATION
MD Notification    Notified Person: MD Dr. Paulino    Notified Person Name:    Notification Date/Time:2/1/19 8833    Notification Interaction:telephone    Purpose of Notification: NA results and decreasing blood sugars  Orders Received:stopp Iv and recheck, blood sugar. Notify if  Cont to decrease.    Comments:

## 2019-02-02 NOTE — PROGRESS NOTES
"SPIRITUAL HEALTH SERVICES Progress Note  [facility] OBS    Visit per pt request for HCD.   provided HCD and explained to pt.      Pt talked about her family, and shared that her father filled out HCD when he had surgery.  Pt said she is going to name her daughter as her medical decision maker.  She said her  has recently become blind, and that her two sons live with them in their home.  Pt said she has been through a lot lately, even naming the grief of losing her dog in January.  Pt said \"I talk to God every day.\"     provided information about HCD, listening and support.      Pt said she is hoping she can be home tomorrow for breakfast, and for the super bowl.  Pt said she has had a lot of health issues, \"I am not a normal person.\"  She said she has support from her family and friends, and plans to retire in two years and downsize.  She expressed some stress in caring for her , who can no longer work.       remains available as needs arise, and is available upon request.      Alejandra Lopes  Chaplain Resident  "

## 2019-02-02 NOTE — H&P
Admitted:     02/01/2019      PRIMARY CARE PROVIDER:  Dr. Vasquez Benoit.      CHIEF COMPLAINT:  Nausea and vomiting.      HISTORY OF PRESENT ILLNESS:  Shirley Hendricks is a very pleasant 58-year-old female with a past medical history of peripheral vascular disease, coronary artery disease with MI, COPD, anxiety, GERD, hypertension, hyperlipidemia, depression and asthma who presented to the Emergency Department with complaints of approximately 3 days of nausea and vomiting.  The patient states that the nausea is constantly persistent and she has been unable to eat or keep anything down for the last 2 days.  She states that there is no blood or coffee ground emesis.  She did have 1 episode of diarrhea 3 days ago, but states no bowel movement or flatus since.  She denies any fever or chills.  Denies any ill contacts, exotic foods, or recent travel.  She does have associated lightheadedness and dizziness.  She denies any dysuria, hematuria, cough, shortness of breath, chest pain, or rashes.  This morning she did develop a headache with neck pain.  Of note, she was scheduled to undergo colonoscopy today, but was unable to complete the prep due to her symptoms.  She presented to the Emergency Department for further evaluation.      In the Emergency Department, patient was evaluated by Dr. Schmid.  There she was found to have initial vital signs with blood pressure 151/86, pulse 70, temperature 97.9 and 99% oxygen saturation.  Lab work was obtained with a CMP showing sodium of 120, chloride 85, glucose 62, ALT 75 and is otherwise within normal limits.  Creatinine 0.74.  Her lipase was normal at 101.  Urinalysis showed 40 ketones and few bacteria, but was otherwise negative.  CBC showed hemoglobin 16.4, but was otherwise within normal limits with a WBC of 8.7 and platelet count 196.  A CT abdomen and pelvis without contrast showed no acute abnormality.  The patient was given Zofran, a GI cocktail and 1 liter IV normal saline  bolus.  Her repeat sodium was 121.  She was admitted to the observation unit for further cares.      PAST MEDICAL HISTORY:   1.  Generalized anxiety.   2.  COPD.   3.  Discoid lupus erythematosus of eyelid.   4.  GERD.   5.  Hypertension.   6.  Hyperlipidemia.   7.  Mild major depressive disorder.   8.  Coronary artery disease with history of MI.   9.  Peripheral arterial disease.   10.  Osteoarthrosis.   11.  Reflux esophagitis.   12.  Uncomplicated asthma.   13.  Vitamin D deficiency.      PAST SURGICAL HISTORY:    Past Surgical History:   Procedure Laterality Date     ANGIOGRAM       C FABRIC WRAPPING OF ABDOMINAL ANEURYSM       C NONSPECIFIC PROCEDURE  12/00    angioplasty with stent right fem. a.     C NONSPECIFIC PROCEDURE  1987    sinus surgery     C NONSPECIFIC PROCEDURE  9/2/2009    Emergent left groin exploration with oversewing of bleeding angiographic site. 2. Endarterectomy of common femoral-proximal superficial femoral artery with greater saphenous vein patch angioplasty.   a. [place] of accessory right greater saphenous vein.      C NONSPECIFIC PROCEDURE  8/27/2009    occluded left common iliac and external iliac arteries were successfully revascularized with stenting to 8 and 7 mm      CARDIAC CATHERIZATION  9/3/2009    multivessel CAD without target lesions, med mgmt indicated, preserved ef     ENDARTERECTOMY CAROTID Right 5/11/2016    Procedure: ENDARTERECTOMY CAROTID;  Surgeon: Chadwick Lozano MD;  Location:  OR     GYN SURGERY  left tube    left salpingectomy     LAPAROSCOPIC CHOLECYSTECTOMY N/A 9/27/2017    Procedure: LAPAROSCOPIC CHOLECYSTECTOMY;  LAPAROSCOPIC CHOLECYSTECTOMY;  Surgeon: Jacoby Tapia MD;  Location: [facility]     ORTHOPEDIC SURGERY      left knee surgery     VASCULAR SURGERY  aoto bi fem  left fem-AK pop       FAMILY HISTORY:  Mother with bladder cancer.  Father with multiple heart attacks.  Brother with clotting disorder.      SOCIAL HISTORY:  The patient is  .  She is a current half pack a day smoker and has smoked for 40 years.  She drinks alcohol seldomly.  Denies any illicit drug use.      PRIOR TO ADMISSION MEDICATIONS:    Prior to Admission medications    Medication Sig Last Dose Taking? Auth Provider   Acetaminophen (TYLENOL PO) Take 1,000-1,500 mg by mouth every 6 hours as needed for mild pain or fever   Yes Reported, Patient   albuterol (PROAIR HFA/PROVENTIL HFA/VENTOLIN HFA) 108 (90 Base) MCG/ACT Inhaler Inhale 1-2 puffs into the lungs every 4 hours as needed for shortness of breath / dyspnea or wheezing  Yes Elvia Campbell MD   ascorbic acid (VITAMIN C) 500 MG tablet 1 tab twice a day 1/30/2019 Yes Vasquez Benoit MD   ASPIRIN EC PO Take 81 mg by mouth daily 1/30/2019 Yes Reported, Patient   atorvastatin (LIPITOR) 80 MG tablet TAKE ONE TABLET (80MG) BY MOUTH EVERY EVENING INDICATION:TO LOWER CHOLESTEROL AND TO HELP REDUCE RISK OF REOCURRENCE OF HEAR ATTACK STROKE,A 1/30/2019 Yes Vasquez Benoit MD   Calcium Carbonate-Vitamin D3 (CALCIUM 600+D3) 600-400 MG-UNIT TABS Take 1 tablet by mouth 2 times daily 1/30/2019 Yes Vasquez Benoit MD   clopidogrel (PLAVIX) 75 MG tablet Take 1 tablet (75 mg) by mouth daily 1/30/2019 Yes Vasquez Benoit MD   denosumab (PROLIA) 60 MG/ML SOLN injection Inject 1 mL (60 mg) Subcutaneous every 6 months INDICATION: TO TREAT OSTEOPOROSIS 7/18/2017 Yes Vasquez Benoit MD   fluticasone-vilanterol (BREO ELLIPTA) 200-25 MCG/INH oral inhaler Inhale 1 puff into the lungs daily INDICATION: COPD CONTROLLER 1/30/2019 Yes Vasquez Benoit MD   lisinopril (PRINIVIL/ZESTRIL) 20 MG tablet Take 1 tablet (20 mg) by mouth 2 times daily  Yes Nelly Aden PA-C   metoprolol succinate (TOPROL-XL) 25 MG 24 hr tablet Take 1 tablet (25 mg) by mouth daily 1/30/2019 Yes Vasquez Benoit MD   nitroGLYcerin (NITROSTAT) 0.4 MG sublingual tablet Place 1 tablet (0.4 mg) under the tongue See Admin Instructions for chest pain  Yes Eun Narvaez MD    omeprazole (PRILOSEC) 20 MG CR capsule 1 capsule daily as needed (not using daily)  Yes Vasquez Benoit MD        ALLERGIES:  CONTRAST DYE, PROTONIX AND PENICILLINS.      REVIEW OF SYSTEMS:  A complete 10-point review of systems was performed and is negative other than the items previously mentioned in above HPI.      PHYSICAL EXAMINATION:   VITAL SIGNS:  Blood pressure 122/66, heart rate 61 beats per minute, temperature 96.8, respiratory rate 16, oxygen saturation 97% on room air.   GENERAL:  The patient is alert, oriented to person, place, date, situation, cooperative, lying in bed, in no apparent distress.   EYES:  Pupils equal and round.  Extraocular movements intact.  Sclerae nonicteric.   HENT:  Head normocephalic.  Throat, lips, mucosa and tongue appear moist.   NECK:  Supple.  No cervical adenopathy.   CARDIOVASCULAR:  Heart:  Regular rate and rhythm.  No murmurs, rubs or gallops.  Distal pulses are intact.  No edema.   PULMONARY:  Lungs are clear to auscultation bilaterally.  No crackles, wheezes or rhonchi.  Breathing is nonlabored.   GASTROINTESTINAL:  Abdomen is soft, nontender, nondistended with normoactive bowel sounds.   MUSCULOSKELETAL:  The patient moves all 4 extremities equally with normal strength.   NEUROLOGIC:  Alert.  Cranial nerves II-XII are grossly intact.  Motor function is intact.  No focal deficits.   SKIN:  Warm, dry, nondiaphoretic.  No rashes on exposed skin.   PSYCHIATRIC:  Normal mood and affect.      LABORATORY DATA:  CBC:  WBC 8.7, hemoglobin 16.4, hematocrit 45.0, platelet count 196.  RBC 5.2 and otherwise within normal limits.  CMP:  Sodium 120, potassium 4.7, chloride 85, creatinine 0.74, ALT 75, AST 39, otherwise within normal limits.  Glucose 62.  Subsequent glucose 110.  Urinalysis with 40 ketones, a few bacteria.      IMAGING:  CT abdomen and pelvis without contrast:  Per radiology, no acute abnormality.      ASSESSMENT AND PLAN:  Shirley Hendricks is a pleasant 58-year-old  female with a past medical history of MI, stroke, hypertension, hyperlipidemia and peripheral arterial disease who presented to the Emergency Department for evaluation of approximately 3 days of nausea and vomiting.  She was found to be hyponatremic and was admitted to the observation unit for further treatment.     1.  Hyponatremia.  This is secondary to gastrointestinal losses and no p.o. intake the last few days.  Sodium on arrival to ED was 120.  She received 1 liter normal saline IV fluid bolus with subsequent sodium of 121 and was then placed on 125 mL per hour maintenance fluids.  Repeat this afternoon, sodium is 127.  I would like to avoid rapid correction so we will discontinue IV fluids at this time.  We will repeat sodium level q.4h. and if her next level is 129 or higher, would start D5 fluids at a slow correction rate.  Repeat full BMP in the morning.      2.  Nausea and vomiting.  This began acutely approximately 2 days ago on Wednesday evening.  Prior to this the patient did have 1 episode of diarrhea which was followed by multiple episodes of nonbloody vomiting.  She did have some mild abdominal discomfort but none since.  No fevers or chills.  No recent travel, exotic foods or ill contacts to her knowledge.  She did receive some hydration as above.  Zofran seemed to help, so will continue this p.r.n.  She is tolerating shasha crackers and liquids and she can advance her diet as tolerated.  Of note, her lipase is normal and CT abdomen and pelvis does not show any signs for any pain or symptoms.  ALT mildly elevated at 75, so we will repeat CMP in the morning.  No fever or leukocytosis.      3.  Coronary artery disease, peripheral vascular disease, hypertension, hyperlipidemia.  We will resume patient's prior to admission aspirin and Plavix as there is no evidence of bleeding.  Resume lisinopril and metoprolol with holding parameters.  Hold hydrochlorothiazide for now.  She has no chest pain symptoms.       4.  Gastroesophageal reflux disease.  Continue with prior to admission omeprazole.      5.  Chronic obstructive pulmonary disease.  Lungs are clear on exam.  Given observation status, we will hold prior to admission Breo Ellipta.   If it looks like the patient will not discharged tomorrow, would resume this medication.      6.  Albuterol inhaler also on hold, but could be reordered if needed.      7.  Tobacco use disorder.  The patient is smoking half a pack of cigarettes per day.  She is requesting a nicotine patch, so this has been ordered.  Counseled on smoking cessation.      8.  Deep venous thrombosis prophylaxis.  This will be with ambulation, anticipating a short stay.  She is also on aspirin and Plavix.      CODE STATUS:  Full code.      DISPOSITION:  Likely discharge tomorrow pending further improvement of sodium and resolution of nausea, vomiting.      This patient was [facility]  She is in agreement with my assessment and plan of care.         MARIA ALEJANDRA GONSALES MD       As dictated by NICOLAS RIOS PA-C            D: 2019   T: 2019   MT: SANTINO      Name:     KASIA WAN   MRN:      [id number]        Account:      [id number]   :      1960        Admitted:     2019                   Document: [id number]       cc: Vasquez Benoit MD

## 2019-02-02 NOTE — PLAN OF CARE
PRIMARY DIAGNOSIS: GASTROENTERITIS    OUTPATIENT/OBSERVATION GOALS TO BE MET BEFORE DISCHARGE  1. Orthostatic performed: No    2. Tolerating PO fluid and/or antibiotics (if applicable): Yes    3. Nausea/Vomiting/Diarrhea symptoms improved: Yes    4. Pain status: Improved-controlled with oral pain medications.    5. Return to near baseline physical activity: Yes    Discharge Planner Nurse   Safe discharge environment identified: Yes  Barriers to discharge: No       Entered by: Rima ELIAS Leukuma 02/01/2019 10:12 PM   Alert and oriented, cont with c/o of neck pain, relieved with tylenol. Tolerating diet. Up without dizziness.  Voiding without diff. Na results called to MD.  Please review provider order for any additional goals.   Nurse to notify provider when observation goals have been met and patient is ready for discharge.

## 2019-02-02 NOTE — PLAN OF CARE
AVSS; tylenol for back and neck pain with relief; pt denied nausea; no emesis or diarrhea; Na 127 up from 126 after pt SL'd;  glucose low of 65 up to 123 after shasha crackers/juice; pt up independently in the room; denied dizzyness/lightheadedness; continue to follow glucoses/sodiums.

## 2019-02-02 NOTE — PROGRESS NOTES
Paged by nursing staff regarding decreased sodium from 127--124 after stopping IVF. Pt admitted with hyponatremia, will resume fluids at decreased rate of 75cc/h, recheck sodium at 4h (~10p).    Chaim Headley PA-C  2/1/2019, 7:29 PM  Pager: [phone]

## 2019-02-02 NOTE — PROGRESS NOTES
Observation Goals:    -diagnostic tests completed and resulted - not met  -vital signs normal or at patient baseline - met  -tolerating oral intake to maintain hydration - not met  -adequate pain control on oral analgesics - met  -returns to baseline functional status - partially met  -Sodium level improved >131 - not met

## 2019-02-04 ENCOUNTER — TELEPHONE (OUTPATIENT)
Dept: INTERNAL MEDICINE | Facility: CLINIC | Age: 59
End: 2019-02-04

## 2019-02-04 DIAGNOSIS — I10 ESSENTIAL HYPERTENSION: ICD-10-CM

## 2019-02-04 LAB
ALBUMIN UR-MCNC: NEGATIVE MG/DL
ANION GAP SERPL CALCULATED.3IONS-SCNC: 5 MMOL/L (ref 3–14)
APPEARANCE UR: CLEAR
BILIRUB UR QL STRIP: NEGATIVE
BUN SERPL-MCNC: 13 MG/DL (ref 7–30)
CALCIUM SERPL-MCNC: 8.4 MG/DL (ref 8.5–10.1)
CHLORIDE SERPL-SCNC: 98 MMOL/L (ref 94–109)
CO2 SERPL-SCNC: 25 MMOL/L (ref 20–32)
COLOR UR AUTO: YELLOW
CREAT SERPL-MCNC: 0.53 MG/DL (ref 0.52–1.04)
GFR SERPL CREATININE-BSD FRML MDRD: >90 ML/MIN/{1.73_M2}
GLUCOSE SERPL-MCNC: 88 MG/DL (ref 70–99)
GLUCOSE UR STRIP-MCNC: NEGATIVE MG/DL
HGB UR QL STRIP: NEGATIVE
KETONES UR STRIP-MCNC: NEGATIVE MG/DL
LEUKOCYTE ESTERASE UR QL STRIP: NEGATIVE
NITRATE UR QL: NEGATIVE
NON-SQ EPI CELLS #/AREA URNS LPF: NORMAL /LPF
PH UR STRIP: 6 PH (ref 5–7)
POTASSIUM SERPL-SCNC: 4.5 MMOL/L (ref 3.4–5.3)
RBC #/AREA URNS AUTO: NORMAL /HPF
SODIUM SERPL-SCNC: 128 MMOL/L (ref 133–144)
SOURCE: NORMAL
SP GR UR STRIP: 1.01 (ref 1–1.03)
TSH SERPL DL<=0.005 MIU/L-ACNC: 0.72 MU/L (ref 0.4–4)
UROBILINOGEN UR STRIP-ACNC: 0.2 EU/DL (ref 0.2–1)
WBC #/AREA URNS AUTO: NORMAL /HPF

## 2019-02-04 PROCEDURE — 84443 ASSAY THYROID STIM HORMONE: CPT | Performed by: INTERNAL MEDICINE

## 2019-02-04 PROCEDURE — 36415 COLL VENOUS BLD VENIPUNCTURE: CPT | Performed by: INTERNAL MEDICINE

## 2019-02-04 PROCEDURE — 81001 URINALYSIS AUTO W/SCOPE: CPT | Performed by: INTERNAL MEDICINE

## 2019-02-04 PROCEDURE — 80048 BASIC METABOLIC PNL TOTAL CA: CPT | Performed by: INTERNAL MEDICINE

## 2019-02-04 NOTE — TELEPHONE ENCOUNTER
"ED / Discharge Outreach Protocol    Patient Contact    Attempt # 1    Was call answered?  Yes.  \"May I please speak with Shirley\"  Is patient available?   No.  Left message with  for patient to call me back.  "

## 2019-02-07 DIAGNOSIS — E67.8 EXCESSIVE VITAMIN B12 INTAKE: Primary | ICD-10-CM

## 2019-02-07 DIAGNOSIS — E87.1 HYPONATREMIA: ICD-10-CM

## 2019-02-18 ENCOUNTER — TELEPHONE (OUTPATIENT)
Dept: INTERNAL MEDICINE | Facility: CLINIC | Age: 59
End: 2019-02-18

## 2019-02-18 NOTE — TELEPHONE ENCOUNTER
[facility] calling:    Pt is having colonoscopy- protocal is to hold plavix 5 days prior.    PCP ok with plavix hold, call [facility] [phone] push option 1 x2.  Pt will be scheduled after receiving return call.

## 2019-02-18 NOTE — TELEPHONE ENCOUNTER
Do not see evidence of procedures in the past 1 year that would restrict holding Plavix. OK tro hold Plavix/Clopidogrel and Aspirin 5 days prior to colonoscopy as requested by [facility] and then restart meds the day after the procedure. Inform both [facility] and pt

## 2019-02-19 NOTE — TELEPHONE ENCOUNTER
[facility] and patient informed. [facility] will call to schedule patient.   Leonor Lee CMA on 2/19/2019 at 10:18 AM

## 2019-02-21 DIAGNOSIS — E67.8 EXCESSIVE VITAMIN B12 INTAKE: ICD-10-CM

## 2019-02-21 DIAGNOSIS — E87.1 HYPONATREMIA: ICD-10-CM

## 2019-02-21 LAB
ANION GAP SERPL CALCULATED.3IONS-SCNC: 9 MMOL/L (ref 3–14)
BUN SERPL-MCNC: 7 MG/DL (ref 7–30)
CALCIUM SERPL-MCNC: 8.8 MG/DL (ref 8.5–10.1)
CHLORIDE SERPL-SCNC: 104 MMOL/L (ref 94–109)
CO2 SERPL-SCNC: 28 MMOL/L (ref 20–32)
CREAT SERPL-MCNC: 0.8 MG/DL (ref 0.52–1.04)
GFR SERPL CREATININE-BSD FRML MDRD: 79 ML/MIN/{1.73_M2}
GLUCOSE SERPL-MCNC: 88 MG/DL (ref 70–99)
POTASSIUM SERPL-SCNC: 4.5 MMOL/L (ref 3.4–5.3)
SODIUM SERPL-SCNC: 141 MMOL/L (ref 133–144)

## 2019-02-21 PROCEDURE — 99000 SPECIMEN HANDLING OFFICE-LAB: CPT | Performed by: INTERNAL MEDICINE

## 2019-02-21 PROCEDURE — 36415 COLL VENOUS BLD VENIPUNCTURE: CPT | Performed by: INTERNAL MEDICINE

## 2019-02-21 PROCEDURE — 82180 ASSAY OF ASCORBIC ACID: CPT | Mod: 90 | Performed by: INTERNAL MEDICINE

## 2019-02-21 PROCEDURE — 80048 BASIC METABOLIC PNL TOTAL CA: CPT | Performed by: INTERNAL MEDICINE

## 2019-02-25 NOTE — ED AVS SNAPSHOT
Emergency Department  [address] [place] [id number]  Phone:  [phone]  Fax:  [phone]                                    Shirley Hendricks   MRN: [id number]    Department:   Emergency Department   Date of Visit:  1/20/2019           After Visit Summary Signature Page    I have received my discharge instructions, and my questions have been answered. I have discussed any challenges I see with this plan with the nurse or doctor.    ..........................................................................................................................................  Patient/Patient Representative Signature      ..........................................................................................................................................  Patient Representative Print Name and Relationship to Patient    ..................................................               ................................................  Date                                   Time    ..........................................................................................................................................  Reviewed by Signature/Title    ...................................................              ..............................................  Date                                               Time          22EPIC Rev 08/18       
No

## 2019-02-26 NOTE — PATIENT INSTRUCTIONS
No solid foods today as per GI prep  Nothing to eat/drink after midnight except take Metoprolol the AM of procedure with a smal sip water. Hold other meds the AM of procedure  Use Breo inhaler tomorrow as usual  May take Lisinopril tomorrow PM as usual with 20mg tab, 1 tab twice a day  Restart Aspirin and Plavix Saturday  as usual  Stop Smoking

## 2019-02-26 NOTE — PROGRESS NOTES
[address] [address]  [phone]  Dept: [phone]    PRE-OP EVALUATION:  Today's date: 2019    Shirley Hendricks (: 1960) presents for pre-operative evaluation assessment as requested by Dr. Dari Carpenter.  She requires evaluation and anesthesia risk assessment prior to undergoing surgery/procedure for treatment of colon polyps    Proposed Surgery/ Procedure: colonoscopy  Date of Surgery/ Procedure: 3-1-19  Time of Surgery/ Procedure: 7:45 am  Hospital/Surgical Facility: [facility]     Primary Physician: Vasquez Benoit  Type of Anesthesia Anticipated: to be determined   Patient has a Health Care Directive or Living Will:  NO    1. YES - DO YOU HAVE A HISTORY OF HEART ATTACK, STROKE, STENT, BYPASS OR SURGERY ON AN ARTERY IN THE HEAD, NECK, HEART OR LEG?  Hx CAD and PAD  2. NO - Do you ever have any pain or discomfort in your chest?  3. NO - Do you have a history of  Heart Failure?   4. NO - Are you troubled by shortness of breath when: walking on the level, up a slight hill or at night?  5. NO - Do you currently have a cold, bronchitis or other respiratory infection?  6. NO - Do you have a cough, shortness of breath or wheezing?  7. NO - Do you sometimes get pains in the calves of your legs when you walk?  8. yes - Do you or anyone in your family have previous history of blood clots? Brother and son have had blood clots  9. NO - Do you or does anyone in your family have a serious bleeding problem such as prolonged bleeding following surgeries or cuts?  10. NO - Have you ever had problems with anemia or been told to take iron pills?  11. NO - Have you had any abnormal blood loss such as black, tarry or bloody stools, or abnormal vaginal bleeding?  12. NO - Have you ever had a blood transfusion?  13. NO - Have you or any of your relatives ever had problems with anesthesia?  14. NO - Do you have sleep apnea, excessive snoring or  daytime drowsiness?  15. NO - Do you have any prosthetic heart valves?  16. NO - Do you have prosthetic joints?  17. NO - Is there any chance that you may be pregnant?      HPI:     HPI related to upcoming procedure:  Had colonoscopy in Oct 2018. 3 polyps seen including a 11-15mm polyp  Near the   ileocecal valve. Pt was on Plavix  then so not removed and presents now for clearance to undergo colonoscopy for removal of that polyp. See below for other medical issues.  History of COPD with symptoms well controlled using Breo patient continues to smoke a half a pack of cigarettes per day and not interested in quitting.daily.  History of coronary artery disease and peripheral arterial disease and 50-69% left carotid stenosis.  Patient last seen by vascular surgery  Aug 2018 with plan to follow-up in 9 months.  Patient denies current claudication symptoms or chest pain/shortness of breath   with usual walking ambulation.  Patient also recently was hauling wood carrying an arm full of logs 50 yards and making for trips doing this without CP/SOB           MEDICAL HISTORY:     Patient Active Problem List    Diagnosis Date Noted     Health Care Home 06/06/2011     Priority: High     EMERGENCY CARE PLAN  Presenting Problem Signs and Symptoms Treatment Plan    Questions or conerns during clinic hours    I will call the clinic directly     Questions or conerns outside clinic hours    I will call the 24 hour nurse line at [phone]    Patient needs to schedule an appointment    I will call the 24 hour scheduling team at [phone] or clinic directly    Same day treatment     I will call the clinic first, nurse line if after hours, urgent care and express care if needed                            DX V65.8 REPLACED WITH 55579 HEALTH CARE HOME (04/08/2013)       Hyponatremia 02/01/2019     Priority: Medium     Vitamin C deficiency 08/12/2018     Priority: Medium     Anxiety 12/07/2017     Priority: Medium     Mild major  depression (H) 11/07/2017     Priority: Medium     Chronic allergic rhinitis due to animal hair and dander 07/20/2017     Priority: Medium     Tobacco use disorder 07/20/2017     Priority: Medium     Chronic obstructive pulmonary disease, unspecified COPD type (H) 07/20/2017     Priority: Medium     S/P carotid endarterectomy 07/07/2016     Priority: Medium     RIGHT SIDE       Bilateral carotid artery stenosis 04/21/2016     Priority: Medium     Coronary artery disease involving native coronary artery of native heart without angina pectoris 03/03/2016     Priority: Medium     Primary osteoarthritis involving multiple joints 03/03/2016     Priority: Medium     DDD (degenerative disc disease), lumbar 03/03/2016     Priority: Medium     Chronic pain syndrome 03/03/2016     Priority: Medium     Patient is followed by BRISA REGAN for ongoing prescription of pain medication.  All refills should be approved by this provider, or covering partner.    Medication(s): TRAMADOL.   Maximum quantity per month: 120  Clinic visit frequency required: Q 3 months     Controlled substance agreement:  Encounter-Level CSA - 3/3/16:               Controlled Substance Agreement - Scan on 3/3/2016  5:20 PM : CONTROLLED SUBSTANCE AGREEMENT- 3/3/2016 (below)            Pain Clinic evaluation in the past: No - CANNOT BE ON NSAIDs BECAUSE SHE IS ON ANTIPLATELETS    DIRE Total Score(s):  No flowsheet data found.    Last [facility] website verification:  done on 07/01/2016,    [email or web address]       Stroke (H) 03/03/2016     Priority: Medium     Hyperlipidemia LDL goal <70 07/07/2014     Priority: Medium     PAD (peripheral artery disease) (H) 07/07/2014     Priority: Medium     Essential hypertension 07/07/2014     Priority: Medium     Problem list name updated by automated process. 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 Simons dermatology     Embolism and thrombosis of unspecified artery (H) 8/00    Protein C,S, Leiden FV, Lupus Inhibitor Negative     Gastroesophageal reflux disease      Hyperlipidaemia      Hypertension      Lupus     skin     Mild major depression (H) 11/7/2017     Myocardial infarction (H)     x3     Osteoarthrosis, unspecified whether generalized or localized, unspecified site      PAD (peripheral artery disease) (H)      Peripheral vascular disease, unspecified (H) 12/00    s/p angioplasty with stent right femoral a.; Right iliac and femoral a. clot     Post-menopausal      Reflux esophagitis 2/04    EGD: esophagitis and medium HH     Uncomplicated asthma      Vitamin C deficiency 8/12/2018     Past Surgical History:   Procedure Laterality Date     ANGIOGRAM       C FABRIC WRAPPING OF ABDOMINAL ANEURYSM       C NONSPECIFIC PROCEDURE  12/00    angioplasty with stent right fem. a.     C NONSPECIFIC PROCEDURE  1987    sinus surgery     C NONSPECIFIC PROCEDURE  9/2/2009    Emergent left groin exploration with oversewing of bleeding angiographic site. 2. Endarterectomy of common femoral-proximal superficial femoral artery with greater saphenous vein patch angioplasty.   a. [place] of accessory right greater saphenous vein.      C NONSPECIFIC PROCEDURE  8/27/2009    occluded left common iliac and external iliac arteries were successfully revascularized with stenting to 8 and 7 mm      CARDIAC CATHERIZATION  9/3/2009     multivessel CAD without target lesions, med mgmt indicated, preserved ef     ENDARTERECTOMY CAROTID Right 5/11/2016    Procedure: ENDARTERECTOMY CAROTID;  Surgeon: Chadwick Lozano MD;  Location:  OR     GYN SURGERY  left tube    left salpingectomy     LAPAROSCOPIC CHOLECYSTECTOMY N/A 9/27/2017    Procedure: LAPAROSCOPIC CHOLECYSTECTOMY;  LAPAROSCOPIC CHOLECYSTECTOMY;  Surgeon: Jacoby Tapia MD;  Location: [facility]     ORTHOPEDIC SURGERY      left knee surgery     VASCULAR SURGERY  aoto bi fem  left fem-AK pop     Current Outpatient Medications   Medication Sig Dispense Refill     Acetaminophen (TYLENOL PO) Take 1,000-1,500 mg by mouth every 6 hours as needed for mild pain or fever        albuterol (PROAIR HFA/PROVENTIL HFA/VENTOLIN HFA) 108 (90 Base) MCG/ACT Inhaler Inhale 1-2 puffs into the lungs every 4 hours as needed for shortness of breath / dyspnea or wheezing 1 Inhaler 0     atorvastatin (LIPITOR) 80 MG tablet TAKE ONE TABLET (80MG) BY MOUTH EVERY EVENING INDICATION:TO LOWER CHOLESTEROL AND TO HELP REDUCE RISK OF REOCURRENCE OF HEAR ATTACK STROKE,A 90 tablet 3     Calcium Carbonate-Vitamin D3 (CALCIUM 600+D3) 600-400 MG-UNIT TABS Take 1 tablet by mouth 2 times daily 180 tablet 3     denosumab (PROLIA) 60 MG/ML SOLN injection Inject 1 mL (60 mg) Subcutaneous every 6 months INDICATION: TO TREAT OSTEOPOROSIS 1 Syringe 0     fluticasone-vilanterol (BREO ELLIPTA) 200-25 MCG/INH oral inhaler Inhale 1 puff into the lungs daily INDICATION: COPD CONTROLLER 3 Inhaler 3     lisinopril (PRINIVIL/ZESTRIL) 20 MG tablet Take 1 tablet (20 mg) by mouth 2 times daily 60 tablet 0     metoprolol succinate (TOPROL-XL) 25 MG 24 hr tablet Take 1 tablet (25 mg) by mouth daily 90 tablet 3     omeprazole (PRILOSEC) 20 MG CR capsule 1 capsule daily as needed (not using daily) 90 capsule 3     vitamin C (ASCORBIC ACID) 500 MG tablet 1 tab twice a day 30 tablet      ASPIRIN EC PO Take 81 mg by mouth daily        clopidogrel (PLAVIX) 75 MG tablet Take 1 tablet (75 mg) by mouth daily (Patient not taking: Reported on 2/28/2019) 90 tablet 2     nitroGLYcerin (NITROSTAT) 0.4 MG sublingual tablet Place 1 tablet (0.4 mg) under the tongue See Admin Instructions for chest pain (Patient not taking: Reported on 2/28/2019) 15 tablet 7     OTC products:  Occ Tylenol    Allergies   Allergen Reactions     Contrast Dye Anaphylaxis     RASH, FACIAL AND NECK SWELLING, SOB, WHEEZING     Pantoprazole      Protonix caused diffuse edema     Penicillins Itching      Latex Allergy: NO    Social History     Tobacco Use     Smoking status: Current Every Day Smoker     Packs/day: 0.25     Years: 40.00     Pack years: 10.00     Types: Cigarettes     Start date: 1960     Smokeless tobacco: Never Used     Tobacco comment: 1/2 PPD   Substance Use Topics     Alcohol use: Yes     Alcohol/week: 0.0 oz     Comment: ocasional     History   Drug Use No       REVIEW OF SYSTEMS:   CONSTITUTIONAL: NEGATIVE for fever, chills. Mild weight loss with recent hospital stay for entermitis  INTEGUMENTARY/SKIN: NEGATIVE for worrisome rashes, moles or lesions  EYES: NEGATIVE for vision changes or irritation  ENT/MOUTH: NEGATIVE for ear, mouth and throat problems  RESP: NEGATIVE for significant cough or SOB with inhaler in therapy. Rare use of Albuterol. Using Breo daily. Smoking 1/2 ppd  BREAST: NEGATIVE for masses, tenderness or discharge  CV: NEGATIVE for chest pain, palpitations or peripheral edema. Hauling arm fulls of wood recently at least 50 yds with 4 trips without chest pain. Hx stable left carotid stenosis  GI: NEGATIVE for current nausea, abdominal pain or change in bowel habits. See HPI above re: colon polyps. No blood seen in stools  : NEGATIVE for frequency, dysuria, or hematuria  MUSCULOSKELETAL: NEGATIVE for significant arthralgias or myalgia that limit activity  NEURO: NEGATIVE for weakness, dizziness. Occ tingling in hands. No sx now  ENDOCRINE:  "NEGATIVE for temperature intolerance. On statin therapy  HEME: NEGATIVE for bleeding problems in general. Some bruising when on Plavix and ASA  PSYCHIATRIC: NEGATIVE for changes in mood or affect    EXAM:   /70   Pulse 52   Temp 97.9  F (36.6  C) (Oral)   Resp 14   Ht 1.575 m (5' 2\")   Wt 56.2 kg (124 lb)   SpO2 100%   BMI 22.68 kg/m       HENT: ear canals and TM's normal and nose and mouth without ulcers or lesions   Neck: no adenopathy. Thyroid normal to palpation. Left bruit. Strong  carotid pulse palpable  Pulm: Lungs clear to auscultation   CV: Regular rates and rhythm  GI: Soft, nontender, Normal active bowel sounds, No hepatosplenomegaly or masses palpable  Ext: Peripheral pulses  Mildly reduced RLE distal compared to LLE but palbable No edema.  Neuro: Normal strength and tone, sensory exam grossly normal      DIAGNOSTICS:   EKG: Sinus bradycardia with rate 53.  No acute ST/T changes.  Poor R wave progression  V1-3 suggestive of possible previous anterior infarct.  However, this was the same in 2016 when patient had an echocardiogram also done then which did not show any wall motion abnormality.  Low voltage overall consistent with pulmonary disease and patient smoking/COPD hx    Recent Labs   Lab Test 02/21/19  1111 02/04/19  0934  02/01/19  0845 01/20/19  1144  05/11/16  0840 05/10/16  1208  04/21/16  1400  03/03/16  2358   HGB  --   --   --  16.4* 14.2   < > 11.2*  --    < > 12.1  --   --    PLT  --   --   --  196 183   < >  --   --    < > 198  --  155   INR  --   --   --   --   --   --   --  0.95  --  1.02  --   --     128*   < > 120* 137   < >  --   --    < > 135   < >  --    POTASSIUM 4.5 4.5   < > 4.7 4.4   < > 4.1  --    < > 4.4   < >  --    CR 0.80 0.53   < > 0.74 0.48*   < >  --   --    < > 0.54   < > 0.56   A1C  --   --   --   --   --   --  5.4  --   --   --   --  5.6    < > = values in this interval not displayed.      Component      Latest Ref Rng & Units 2/21/2019 2/28/2019 "   Sodium      133 - 144 mmol/L 141    Potassium      3.4 - 5.3 mmol/L 4.5    Chloride      94 - 109 mmol/L 104    Carbon Dioxide      20 - 32 mmol/L 28    Anion Gap      3 - 14 mmol/L 9    Glucose      70 - 99 mg/dL 88    Urea Nitrogen      7 - 30 mg/dL 7    Creatinine      0.52 - 1.04 mg/dL 0.80    GFR Estimate      >60 mL/min/1.73:m2 79    GFR Estimate If Black      >60 mL/min/1.73:m2 91    Calcium      8.5 - 10.1 mg/dL 8.8    Hemoglobin      11.7 - 15.7 g/dL  13.1     IMPRESSION:   Reason for surgery/procedure: Colon polyp 11-15 mm ileocecal valve area, not removed with previous colonoscopy  Diagnosis/reason for consult:   1.  COPD -stable with current Breo therapy  2.  Tobacco abuse -patient not motivated to quit at this time as her  is also smoking  3. CAD -patient denies chest pain with moderate activity as above  4. 50-69% left carotid artery stenosis -asymptomatic   5. PAD -  RLE mostly.  Patient denies claudication symptoms with usual walking activity    The proposed surgical procedure is considered LOW risk.    REVISED CARDIAC RISK INDEX  The patient has the following serious cardiovascular risks for perioperative complications such as (MI, PE, VFib and 3  AV Block):  Coronary Artery Disease (MI, positive stress test, angina, Qs on EKG)  INTERPRETATION: 1 risks: Class II (low risk - 0.9% complication rate)    The patient has the following additional risks for perioperative complications:  No identified additional risks         RECOMMENDATIONS:       Cardiovascular Risk  Performs 4 METs exercise without symptoms (Walk on level ground at a moderate pace or carrying arms of firewood back and forth 50 yard distance as above)  Patient is already on a Beta Blocker. Continue Betablocker therapy after surgery, using Beta blocker order set as necessary for NPO status.      Pulmonary Risk  Advised smoking cessation.       --Patient is to take all scheduled medications on the day of surgery EXCEPT for  modifications listed below.    ACE Inhibitor or Angiotensin Receptor Blocker (ARB) Use  Ace inhibitor or Angiotensin Receptor Blocker (ARB) and should HOLD this medication for the 24 hours prior to surgery.      APPROVAL GIVEN to proceed with proposed procedure, without further diagnostic evaluation     PLAN:   No solid foods today as per GI prep  Nothing to eat/drink after midnight except take Metoprolol the AM of procedure with a smal sip water. Hold other meds the AM of procedure  Use Breo inhaler tomorrow as usual  May take Lisinopril tomorrow PM as usual with 20mg tab, 1 tab twice a day  Restart Aspirin and Plavix Saturday  as usual (with have been on hold for 7 days prior to the procedure)  Stop Smoking      Signed Electronically by: Vasquez Benoit MD    Copy of this evaluation report is provided to requesting physician.    [facility] Preop Guidelines    Revised Cardiac Risk Index

## 2019-02-28 ENCOUNTER — OFFICE VISIT (OUTPATIENT)
Dept: INTERNAL MEDICINE | Facility: CLINIC | Age: 59
End: 2019-02-28
Payer: COMMERCIAL

## 2019-02-28 VITALS
DIASTOLIC BLOOD PRESSURE: 70 MMHG | SYSTOLIC BLOOD PRESSURE: 134 MMHG | HEIGHT: 62 IN | OXYGEN SATURATION: 100 % | RESPIRATION RATE: 14 BRPM | TEMPERATURE: 97.9 F | WEIGHT: 124 LBS | BODY MASS INDEX: 22.82 KG/M2 | HEART RATE: 52 BPM

## 2019-02-28 DIAGNOSIS — I10 ESSENTIAL HYPERTENSION: ICD-10-CM

## 2019-02-28 DIAGNOSIS — F17.200 TOBACCO USE DISORDER: ICD-10-CM

## 2019-02-28 DIAGNOSIS — E54 VITAMIN C DEFICIENCY: ICD-10-CM

## 2019-02-28 DIAGNOSIS — Z01.818 PREOP GENERAL PHYSICAL EXAM: Primary | ICD-10-CM

## 2019-02-28 DIAGNOSIS — Z86.0100 HISTORY OF COLONIC POLYPS: ICD-10-CM

## 2019-02-28 DIAGNOSIS — J44.9 CHRONIC OBSTRUCTIVE PULMONARY DISEASE, UNSPECIFIED COPD TYPE (H): ICD-10-CM

## 2019-02-28 LAB — HGB BLD-MCNC: 13.1 G/DL (ref 11.7–15.7)

## 2019-02-28 PROCEDURE — 36415 COLL VENOUS BLD VENIPUNCTURE: CPT | Performed by: INTERNAL MEDICINE

## 2019-02-28 PROCEDURE — 85018 HEMOGLOBIN: CPT | Performed by: INTERNAL MEDICINE

## 2019-02-28 PROCEDURE — 99215 OFFICE O/P EST HI 40 MIN: CPT | Performed by: INTERNAL MEDICINE

## 2019-02-28 PROCEDURE — 93000 ELECTROCARDIOGRAM COMPLETE: CPT | Performed by: INTERNAL MEDICINE

## 2019-02-28 RX ORDER — LISINOPRIL 20 MG/1
20 TABLET ORAL 2 TIMES DAILY
Qty: 180 TABLET | Refills: 3 | Status: SHIPPED | OUTPATIENT
Start: 2019-02-28 | End: 2020-04-27

## 2019-02-28 RX ORDER — ASCORBIC ACID 500 MG
TABLET ORAL
Qty: 30 TABLET | COMMUNITY
Start: 2019-02-28 | End: 2019-09-24

## 2019-02-28 ASSESSMENT — MIFFLIN-ST. JEOR: SCORE: 1085.71

## 2019-03-01 ENCOUNTER — ANESTHESIA EVENT (OUTPATIENT)
Dept: GASTROENTEROLOGY | Facility: CLINIC | Age: 59
End: 2019-03-01
Payer: COMMERCIAL

## 2019-03-01 ENCOUNTER — ANESTHESIA (OUTPATIENT)
Dept: GASTROENTEROLOGY | Facility: CLINIC | Age: 59
End: 2019-03-01
Payer: COMMERCIAL

## 2019-03-01 ENCOUNTER — HOSPITAL ENCOUNTER (OUTPATIENT)
Facility: CLINIC | Age: 59
Discharge: HOME | End: 2019-03-01
Attending: INTERNAL MEDICINE | Admitting: INTERNAL MEDICINE
Payer: COMMERCIAL

## 2019-03-01 VITALS
DIASTOLIC BLOOD PRESSURE: 73 MMHG | WEIGHT: 124 LBS | SYSTOLIC BLOOD PRESSURE: 130 MMHG | RESPIRATION RATE: 26 BRPM | HEART RATE: 62 BPM | BODY MASS INDEX: 22.82 KG/M2 | HEIGHT: 62 IN | OXYGEN SATURATION: 99 %

## 2019-03-01 LAB
COLONOSCOPY: NORMAL
VIT C SERPL-MCNC: 106 UMOL/L (ref 23–114)

## 2019-03-01 PROCEDURE — 88305 TISSUE EXAM BY PATHOLOGIST: CPT | Mod: 26,59 | Performed by: INTERNAL MEDICINE

## 2019-03-01 PROCEDURE — 25000128 H RX IP 250 OP 636: Performed by: NURSE ANESTHETIST, CERTIFIED REGISTERED

## 2019-03-01 PROCEDURE — 25800030 ZZH RX IP 258 OP 636: Performed by: NURSE ANESTHETIST, CERTIFIED REGISTERED

## 2019-03-01 PROCEDURE — 37000009 ZZH ANESTHESIA TECHNICAL FEE, EACH ADDTL 15 MIN: Performed by: INTERNAL MEDICINE

## 2019-03-01 PROCEDURE — 25000125 ZZHC RX 250: Performed by: NURSE ANESTHETIST, CERTIFIED REGISTERED

## 2019-03-01 PROCEDURE — 88305 TISSUE EXAM BY PATHOLOGIST: CPT | Performed by: INTERNAL MEDICINE

## 2019-03-01 PROCEDURE — 45381 COLONOSCOPY SUBMUCOUS NJX: CPT | Mod: PT

## 2019-03-01 PROCEDURE — 40000010 ZZH STATISTIC ANES STAT CODE-CRNA PER MINUTE: Performed by: INTERNAL MEDICINE

## 2019-03-01 PROCEDURE — 37000008 ZZH ANESTHESIA TECHNICAL FEE, 1ST 30 MIN: Performed by: INTERNAL MEDICINE

## 2019-03-01 PROCEDURE — 45385 COLONOSCOPY W/LESION REMOVAL: CPT | Performed by: INTERNAL MEDICINE

## 2019-03-01 RX ORDER — ONDANSETRON 2 MG/ML
4 INJECTION INTRAMUSCULAR; INTRAVENOUS
Status: DISCONTINUED | OUTPATIENT
Start: 2019-03-01 | End: 2019-03-01 | Stop reason: HOSPADM

## 2019-03-01 RX ORDER — LIDOCAINE 40 MG/G
CREAM TOPICAL
Status: DISCONTINUED | OUTPATIENT
Start: 2019-03-01 | End: 2019-03-01 | Stop reason: HOSPADM

## 2019-03-01 RX ORDER — NALOXONE HYDROCHLORIDE 0.4 MG/ML
.1-.4 INJECTION, SOLUTION INTRAMUSCULAR; INTRAVENOUS; SUBCUTANEOUS
Status: DISCONTINUED | OUTPATIENT
Start: 2019-03-01 | End: 2019-03-01 | Stop reason: HOSPADM

## 2019-03-01 RX ORDER — FLUMAZENIL 0.1 MG/ML
0.2 INJECTION, SOLUTION INTRAVENOUS
Status: DISCONTINUED | OUTPATIENT
Start: 2019-03-01 | End: 2019-03-01 | Stop reason: HOSPADM

## 2019-03-01 RX ORDER — PROPOFOL 10 MG/ML
INJECTION, EMULSION INTRAVENOUS CONTINUOUS PRN
Status: DISCONTINUED | OUTPATIENT
Start: 2019-03-01 | End: 2019-03-01

## 2019-03-01 RX ORDER — PROPOFOL 10 MG/ML
INJECTION, EMULSION INTRAVENOUS PRN
Status: DISCONTINUED | OUTPATIENT
Start: 2019-03-01 | End: 2019-03-01

## 2019-03-01 RX ORDER — EPHEDRINE SULFATE 50 MG/ML
INJECTION, SOLUTION INTRAMUSCULAR; INTRAVENOUS; SUBCUTANEOUS PRN
Status: DISCONTINUED | OUTPATIENT
Start: 2019-03-01 | End: 2019-03-01

## 2019-03-01 RX ORDER — LIDOCAINE HYDROCHLORIDE 20 MG/ML
INJECTION, SOLUTION INFILTRATION; PERINEURAL PRN
Status: DISCONTINUED | OUTPATIENT
Start: 2019-03-01 | End: 2019-03-01

## 2019-03-01 RX ORDER — ONDANSETRON 2 MG/ML
4 INJECTION INTRAMUSCULAR; INTRAVENOUS EVERY 6 HOURS PRN
Status: DISCONTINUED | OUTPATIENT
Start: 2019-03-01 | End: 2019-03-01 | Stop reason: HOSPADM

## 2019-03-01 RX ORDER — SODIUM CHLORIDE, SODIUM LACTATE, POTASSIUM CHLORIDE, CALCIUM CHLORIDE 600; 310; 30; 20 MG/100ML; MG/100ML; MG/100ML; MG/100ML
INJECTION, SOLUTION INTRAVENOUS CONTINUOUS PRN
Status: DISCONTINUED | OUTPATIENT
Start: 2019-03-01 | End: 2019-03-01

## 2019-03-01 RX ORDER — ONDANSETRON 4 MG/1
4 TABLET, ORALLY DISINTEGRATING ORAL EVERY 6 HOURS PRN
Status: DISCONTINUED | OUTPATIENT
Start: 2019-03-01 | End: 2019-03-01 | Stop reason: HOSPADM

## 2019-03-01 RX ADMIN — Medication 5 MG: at 10:05

## 2019-03-01 RX ADMIN — LIDOCAINE HYDROCHLORIDE 20 MG: 20 INJECTION, SOLUTION INFILTRATION; PERINEURAL at 09:35

## 2019-03-01 RX ADMIN — PROPOFOL 30 MG: 10 INJECTION, EMULSION INTRAVENOUS at 09:32

## 2019-03-01 RX ADMIN — PROPOFOL 50 MCG/KG/MIN: 10 INJECTION, EMULSION INTRAVENOUS at 09:27

## 2019-03-01 RX ADMIN — DEXMEDETOMIDINE HYDROCHLORIDE 12 MCG: 100 INJECTION, SOLUTION INTRAVENOUS at 09:27

## 2019-03-01 RX ADMIN — SODIUM CHLORIDE, POTASSIUM CHLORIDE, SODIUM LACTATE AND CALCIUM CHLORIDE: 600; 310; 30; 20 INJECTION, SOLUTION INTRAVENOUS at 09:25

## 2019-03-01 RX ADMIN — LIDOCAINE HYDROCHLORIDE 30 MG: 20 INJECTION, SOLUTION INFILTRATION; PERINEURAL at 09:32

## 2019-03-01 RX ADMIN — Medication 10 MG: at 09:51

## 2019-03-01 RX ADMIN — DEXMEDETOMIDINE HYDROCHLORIDE 8 MCG: 100 INJECTION, SOLUTION INTRAVENOUS at 09:32

## 2019-03-01 RX ADMIN — PROPOFOL 20 MG: 10 INJECTION, EMULSION INTRAVENOUS at 09:35

## 2019-03-01 RX ADMIN — Medication 5 MG: at 09:36

## 2019-03-01 RX ADMIN — PROPOFOL 50 MG: 10 INJECTION, EMULSION INTRAVENOUS at 09:29

## 2019-03-01 RX ADMIN — LIDOCAINE HYDROCHLORIDE 50 MG: 20 INJECTION, SOLUTION INFILTRATION; PERINEURAL at 09:29

## 2019-03-01 RX ADMIN — Medication 5 MG: at 09:46

## 2019-03-01 ASSESSMENT — ENCOUNTER SYMPTOMS: DYSRHYTHMIAS: 0

## 2019-03-01 ASSESSMENT — LIFESTYLE VARIABLES: TOBACCO_USE: 1

## 2019-03-01 ASSESSMENT — COPD QUESTIONNAIRES
COPD: 1
CAT_SEVERITY: MILD

## 2019-03-01 ASSESSMENT — MIFFLIN-ST. JEOR: SCORE: 1085.71

## 2019-03-01 NOTE — ANESTHESIA POSTPROCEDURE EVALUATION
Patient: Shirley Hendricks    Procedure(s):  COLONOSCOPY (MAC)    Diagnosis:RECALL  Diagnosis Additional Information: No value filed.    Anesthesia Type:  MAC    Note:  Anesthesia Post Evaluation    Patient location during evaluation: Bedside  Patient participation: Able to fully participate in evaluation  Level of consciousness: awake and alert  Pain management: adequate  Airway patency: patent  Cardiovascular status: acceptable  Respiratory status: acceptable  Hydration status: acceptable  PONV: none             Last vitals:  Vitals:    03/01/19 1040 03/01/19 1050 03/01/19 1100   BP: 130/74 136/72 130/73   Pulse: 65 61 62   Resp: 16 16 26   SpO2: 99% 98% 99%         Electronically Signed By: New Noyola MD  March 1, 2019  11:34 AM

## 2019-03-01 NOTE — ANESTHESIA CARE TRANSFER NOTE
Patient: Shirley Hendricks    Procedure(s):  COLONOSCOPY (MAC)    Diagnosis: RECALL  Diagnosis Additional Information: No value filed.    Anesthesia Type:   MAC     Note:  Airway :Nasal Cannula  Patient transferred to:Phase II  Handoff Report: Identifed the Patient, Identified the Reponsible Provider, Reviewed the pertinent medical history, Discussed the surgical course, Reviewed Intra-OP anesthesia mangement and issues during anesthesia, Set expectations for post-procedure period and Allowed opportunity for questions and acknowledgement of understanding      Vitals: (Last set prior to Anesthesia Care Transfer)    CRNA VITALS  3/1/2019 0951 - 3/1/2019 1024      3/1/2019             NIBP:  122/70    NIBP Mean:  92    Ht Rate:  69    Resp Rate (set):  10                Electronically Signed By: NOELLE Colon CRNA  March 1, 2019  10:24 AM

## 2019-03-01 NOTE — ANESTHESIA PREPROCEDURE EVALUATION
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 clot     Post-menopausal      Reflux esophagitis     EGD: esophagitis and medium HH     Uncomplicated asthma      Vitamin C deficiency 2018     Past Surgical History:   Procedure Laterality Date     ANGIOGRAM       C FABRIC WRAPPING OF ABDOMINAL ANEURYSM       C NONSPECIFIC PROCEDURE      angioplasty with stent right fem. a.     C NONSPECIFIC PROCEDURE      sinus surgery     C NONSPECIFIC PROCEDURE  2009    Emergent left groin exploration with oversewing of bleeding angiographic site. 2. Endarterectomy of common femoral-proximal superficial femoral artery with greater saphenous vein patch angioplasty.   a. [place] of accessory right greater saphenous vein.      C NONSPECIFIC PROCEDURE  2009    occluded left common iliac and external iliac arteries were successfully revascularized with stenting to 8 and 7 mm      CARDIAC CATHERIZATION  9/3/2009    multivessel CAD without target lesions, med mgmt indicated, preserved ef     ENDARTERECTOMY CAROTID Right 2016     Procedure: ENDARTERECTOMY CAROTID;  Surgeon: Chadwick Lozano MD;  Location:  OR     GYN SURGERY  left tube    left salpingectomy     LAPAROSCOPIC CHOLECYSTECTOMY N/A 9/27/2017    Procedure: LAPAROSCOPIC CHOLECYSTECTOMY;  LAPAROSCOPIC CHOLECYSTECTOMY;  Surgeon: Jacoby Tapia MD;  Location: [facility]     ORTHOPEDIC SURGERY      left knee surgery     VASCULAR SURGERY  aoto bi fem  left fem-AK pop       Anesthesia Evaluation     . Pt has had prior anesthetic. Type: General (Easy mask, MAC 3= grade 1 view)           ROS/MED HX    ENT/Pulmonary:     (+)allergic rhinitis, tobacco use, Current use 0.5 packs/day  mild COPD, , . .   (-) sleep apnea   Neurologic:     (+)neuropathy - Bilateral toe numbness/tingling, CVA date: 2016 - numbness/tingling LUE intermittently without deficitsother neuro Lumbar degenerative disc disease, cervicalgia    Cardiovascular: Comment: S/P carotid endarterectomy    (+) Dyslipidemia, hypertension-Peripheral Vascular Disease-- Carotid Stenosis, CAD, -past MI,-. : . . . :. . Previous cardiac testing Echodate:2016results:Interpretation Summary  Normal LV size, mass, wall motion, and function (LVEF 60-65%)  Normal RV size and function  No significant valvular heart disease  There is no right to left shunt at the atrial septal level by agitated saline  contrast study at rest and with Valsalva maneuver.  Previous study not available for comparison.date: results:ECG reviewed date:2/2019 results:SB at 53 bpm date: results:         (-) arrhythmias   METS/Exercise Tolerance:  >4 METS   Hematologic:     (+) History of blood clots pt is not anticoagulated, -      Musculoskeletal: Comment: SLE        GI/Hepatic:     (+) GERD Asymptomatic on medication,      (-) liver disease   Renal/Genitourinary:      (-) renal disease   Endo:      (-) Type I DM and Type II DM   Psychiatric:     (+) psychiatric history depression and anxiety      Infectious Disease:         Malignancy:         Other:    (+)  "H/O Chronic Pain,                        Physical Exam  Normal systems: dental    Airway   Mallampati: II  TM distance: >3 FB  Neck ROM: full    Dental     Cardiovascular   Rhythm and rate: regular      Pulmonary    breath sounds clear to auscultation            Lab Results   Component Value Date    WBC 8.7 02/01/2019    HGB 13.1 02/28/2019    HCT 45.0 02/01/2019     02/01/2019    CRP <2.9 09/18/2014    SED 6 09/18/2014     02/21/2019    POTASSIUM 4.5 02/21/2019    CHLORIDE 104 02/21/2019    CO2 28 02/21/2019    BUN 7 02/21/2019    CR 0.80 02/21/2019    GLC 88 02/21/2019    SONIA 8.8 02/21/2019    PHOS 4.3 09/13/2018    MAG 1.8 09/13/2018    ALBUMIN 3.2 (L) 02/02/2019    PROTTOTAL 6.1 (L) 02/02/2019    ALT 52 (H) 02/02/2019    AST 28 02/02/2019    ALKPHOS 46 02/02/2019    BILITOTAL 0.5 02/02/2019    LIPASE 101 02/01/2019    AMYLASE 46 08/14/2017    PTT 25 04/21/2016    INR 0.95 05/10/2016    TSH 0.72 02/04/2019    T4 1.19 03/28/2017       Preop Vitals  BP Readings from Last 3 Encounters:   02/28/19 134/70   02/02/19 111/53   01/21/19 150/70    Pulse Readings from Last 3 Encounters:   02/28/19 52   02/02/19 54   01/21/19 51      Resp Readings from Last 3 Encounters:   02/28/19 14   02/02/19 16   01/21/19 16    SpO2 Readings from Last 3 Encounters:   02/28/19 100%   02/02/19 99%   01/21/19 99%      Temp Readings from Last 1 Encounters:   02/28/19 36.6  C (97.9  F) (Oral)    Ht Readings from Last 1 Encounters:   02/28/19 1.575 m (5' 2\")      Wt Readings from Last 1 Encounters:   02/28/19 56.2 kg (124 lb)    Estimated body mass index is 22.68 kg/m  as calculated from the following:    Height as of 2/28/19: 1.575 m (5' 2\").    Weight as of 2/28/19: 56.2 kg (124 lb).       Anesthesia Plan      History & Physical Review  History and physical reviewed and following examination; no interval change.    ASA Status:  4 .    NPO Status:  > 8 hours    Plan for MAC   PONV prophylaxis:  Ondansetron (or other " 5HT-3)  Dexmedetomidine with low dose midazolam    Sparing use of propofol if absolutely necessary      Postoperative Care  Postoperative pain management:  Multi-modal analgesia.      Consents  Anesthetic plan, risks, benefits and alternatives discussed with:  Patient..                 [place] MD

## 2019-03-04 LAB — COPATH REPORT: NORMAL

## 2019-04-17 ENCOUNTER — OFFICE VISIT (OUTPATIENT)
Dept: INTERNAL MEDICINE | Facility: CLINIC | Age: 59
End: 2019-04-17
Payer: COMMERCIAL

## 2019-04-17 VITALS
WEIGHT: 125.7 LBS | OXYGEN SATURATION: 98 % | BODY MASS INDEX: 22.99 KG/M2 | RESPIRATION RATE: 18 BRPM | SYSTOLIC BLOOD PRESSURE: 162 MMHG | DIASTOLIC BLOOD PRESSURE: 76 MMHG | TEMPERATURE: 98.2 F | HEART RATE: 58 BPM

## 2019-04-17 DIAGNOSIS — Z12.2 ENCOUNTER FOR SCREENING FOR LUNG CANCER: ICD-10-CM

## 2019-04-17 DIAGNOSIS — H81.11 BENIGN PAROXYSMAL POSITIONAL VERTIGO OF RIGHT EAR: ICD-10-CM

## 2019-04-17 DIAGNOSIS — I10 BENIGN ESSENTIAL HYPERTENSION: Primary | ICD-10-CM

## 2019-04-17 PROCEDURE — 99214 OFFICE O/P EST MOD 30 MIN: CPT | Performed by: INTERNAL MEDICINE

## 2019-04-17 RX ORDER — AMLODIPINE BESYLATE 5 MG/1
5 TABLET ORAL DAILY
Qty: 90 TABLET | Refills: 0 | Status: SHIPPED | OUTPATIENT
Start: 2019-04-17 | End: 2019-05-03

## 2019-04-17 NOTE — PROGRESS NOTES
SUBJECTIVE:                                                      HPI: Shirley Hendricks is a pleasant 58 year old female who presents for blood pressure follow-up:    Patient's blood pressures have been running high at home over the last week. No obvious precipitating factors. Current medications include lisinopril 20 mg twice a day and Toprol 25 mg daily. She is asymptomatic from a blood pressure perspective: no chest pain or palpitations, no shortness of breath, no light-headedness, no headaches or vision changes.    Patient does complain of new onset dizziness over the last several days. Occurs when turning over in bed, standing up from bending over, or looking over her shoulder. Describes dizziness as room spinning. Lasts 10-15 seconds before resolving. No associated nausea or vomiting. No headaches, vision changes, or hearing loss.  No recent colds or allergy symptoms. Importantly, patient has been able to drive without symptoms.    Unrelated to above, patient is due for lung cancer screening.    The medication, allergy, and problem lists have been reviewed and updated as appropriate.     OBJECTIVE:                                                      /76   Pulse 58   Temp 98.2  F (36.8  C) (Oral)   Resp 18   Wt 57 kg (125 lb 11.2 oz)   SpO2 98%   BMI 22.99 kg/m    Constitutional: well-appearing    Positive Starbuck-Hallpike maneuver on the right.    ASSESSMENT/PLAN:                                                      (I10) Benign essential hypertension  (primary encounter diagnosis)  Comment: poorly controlled on current regimen.  Plan:    - START amlodipine 5 mg daily.   - CONTINUE lisinopril 20 mg twice a day and Toprol 25 mg daily.   - blood pressure check with me or PCP in ~2 weeks.     (H81.11) Benign paroxysmal positional vertigo of right ear  Plan:    - referred for vestibular therapy-patient will be contacted to schedule.   - patient may perform the Epley maneuver at home with a family  member to assist.    (Z12.2) Encounter for screening for lung cancer  Plan: CT Chest Lung Cancer Scrn Low Dose wo ordered - patient to schedule.        The instructions on the AVS were discussed and explained to the patient. Patient expressed understanding of instructions.    (Chart documentation was completed, in part, with Battle[employer] voice-recognition software. Even though reviewed, some grammatical, spelling, and word errors may remain.)    Lizeth Peterson MD   [address] [address]  T: [phone], F: [phone]

## 2019-04-17 NOTE — PATIENT INSTRUCTIONS
You will be contacted to schedule vestibular therapy.    ---    Recommend Epley maneuver with the help of a family member (can watch on YouTube). Recommend doing on a bed with head hanging off the foot of the bed.     ---    START amlodipine 5mg daily.    CONTINUE lisinopril 20mg twice a day.     CONTINUE Toprol 25mg daily.

## 2019-04-22 ENCOUNTER — HOSPITAL ENCOUNTER (OUTPATIENT)
Dept: CT IMAGING | Facility: CLINIC | Age: 59
Discharge: HOME | End: 2019-04-22
Attending: INTERNAL MEDICINE | Admitting: INTERNAL MEDICINE
Payer: COMMERCIAL

## 2019-04-22 DIAGNOSIS — Z12.2 ENCOUNTER FOR SCREENING FOR LUNG CANCER: ICD-10-CM

## 2019-04-22 PROCEDURE — G0297 LDCT FOR LUNG CA SCREEN: HCPCS

## 2019-04-24 ENCOUNTER — HOSPITAL ENCOUNTER (OUTPATIENT)
Dept: PHYSICAL THERAPY | Facility: CLINIC | Age: 59
Setting detail: THERAPIES SERIES
End: 2019-04-24
Attending: INTERNAL MEDICINE
Payer: COMMERCIAL

## 2019-04-24 DIAGNOSIS — H81.11 BENIGN PAROXYSMAL POSITIONAL VERTIGO OF RIGHT EAR: ICD-10-CM

## 2019-04-24 PROCEDURE — 97161 PT EVAL LOW COMPLEX 20 MIN: CPT | Mod: GP | Performed by: PHYSICAL THERAPIST

## 2019-04-24 PROCEDURE — 95992 CANALITH REPOSITIONING PROC: CPT | Mod: GP | Performed by: PHYSICAL THERAPIST

## 2019-04-24 NOTE — PROGRESS NOTES
04/24/19 1400   Quick Adds   Quick Adds Vestibular Eval   Type of Visit Initial OP PT Evaluation   General Information   Start of Care Date 04/24/19   Referring Physician Lizeth Peterson MD   Orders Evaluate and Treat as Indicated   Order Date 04/17/19   Medical Diagnosis Benign paroxysmal positional vertigo of right ear H81.11    Onset of illness/injury or Date of Surgery 04/01/19   Surgical/Medical history reviewed Yes   Pertinent history of current vestibular problem (include personal factors and/or comorbidities that impact the POC)  Migraines;Prior concussion(s)   Pertinent history of current problem (include personal factors and/or comorbidities that impact the POC) Patient reports intermittent episodes of vertigo over the past 10 years. They typically resolve on their own within about a week. This current episode started about a month ago and has been more severe that the others, is lasting longer so she decided to mention it to her PCP. She states the vertigo comes on when she lays in bed, rolls over and looks up. It only lasts for 10-15 secs at a time. She often gets nauseous but has not vomited. She feels like she is drunk when walking sometimes. Patient does have h/o vascular disease with prior bypass in L LE, stents in groin B and carotidendartectomy R. She has h/o TIAs as well.    Pertinent Visual History  no visual changes. 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 No   Have you fallen and had an injury in the past year? No   Is patient a fall risk? No   Abuse Screen (yes response referral indicated)   Feels Unsafe at Home or Work/School no   Feels Threatened by Someone no   Does Anyone Try to Keep You From Having Contact with Others or Doing Things Outside Your Home? no   Physical Signs of Abuse Present no   System Outcome Measures   Outcome Measures BPPV   Dizziness Handicap Inventory (score out of 100) A decrease in score by 17.18 or greater indicates a clinically significant change in symptoms. 14   Pain   Patient currently in pain No   Cognitive Status Examination   Orientation orientation to person, place and time   Level of Consciousness alert   Follows Commands and Answers Questions 100% of the time   Personal Safety and Judgment intact   Posture   Posture Forward head position;Protracted shoulders;Kyphosis   Bed Mobility   Bed Mobility Comments independent   Transfer Skills   Transfer Comments independent   Gait   Gait Comments antalgic gait   Gait Special Tests   Gait Special Tests DYNAMIC GAIT INDEX   Gait Special Tests Dynamic Gait Index   Comments 10/12 on 4 item DGI (9 or less indicates increased fall risk)   Coordination   Coordination no deficits were identified   Cervicogenic Screen   Neck ROM sufficient for positional testing   Oculomotor Exam   Smooth Pursuit Other   Smooth Pursuit Comment saccadic intrusions possibly reated to age   Saccades Other   Saccades Comments hypometric vertical, 2 moves   VOR Normal   VOR Comments WNL slow VOR   Rapid Head Thrust Other   Rapid Head Thrust Comments corrective saccade B   Infrared Goggle Exam or Frenzel Lense Exam   Vestibular Suppressant in Last 24 Hours? No   Exam completed with Infrared Goggles   Spontaneous Nystagmus Negative   Gaze Evoked Nystagmus Negative   Head Shake Horizontal Nystagmus Negative   [place]-Hallpike (right) Negative   Ruiz-Hallpike  "(Left) Upbeating L torsional   Ruiz-Hallpike (left) comments lasting ~30\" with mild, short lasting dizziness   HSCC Supine Roll Test (Right) Negative   HSCC Supine Roll Test (Left) Negative   BPPV Canal(s) L Posterior   BPPV Type Canalithasis   Planned Therapy Interventions   Planned Therapy Interventions neuromuscular re-education;other (see comments)   Planned Therapy Interventions Comment CRM   Clinical Impression   Criteria for Skilled Therapeutic Interventions Met yes, treatment indicated   PT Diagnosis s/s L BPPV   Influenced by the following impairments intermittent vertigo, nausea and imbalance   Functional limitations due to impairments bed mobility, household tasks that require looking up or bending over   Clinical Presentation Stable/Uncomplicated   Clinical Decision Making (Complexity) Low complexity   Therapy Frequency 1 time/week   Predicted Duration of Therapy Intervention (days/wks) 3 week   Risk & Benefits of therapy have been explained Yes   Patient, Family & other staff in agreement with plan of care Yes   Education Assessment   Barriers to Learning No barriers   GOALS   PT Eval Goals 1;2   Goal 1   Goal Identifier 1   Goal Description Patient will score 0/100 on DHI indicating resolution of dizziness to be able to care for herself and her spouse safely without restriction.   Target Date 05/15/19   Goal 2   Goal Identifier 2   Goal Description Patient will have negative s/s of BPPV for safety with bed mobility.   Target Date 05/15/19   Total Evaluation Time   PT Eval, Low Complexity Minutes ([id number]) 36     "

## 2019-05-03 ENCOUNTER — ANCILLARY PROCEDURE (OUTPATIENT)
Dept: MAMMOGRAPHY | Facility: CLINIC | Age: 59
End: 2019-05-03
Attending: INTERNAL MEDICINE
Payer: COMMERCIAL

## 2019-05-03 ENCOUNTER — OFFICE VISIT (OUTPATIENT)
Dept: INTERNAL MEDICINE | Facility: CLINIC | Age: 59
End: 2019-05-03
Payer: COMMERCIAL

## 2019-05-03 VITALS
OXYGEN SATURATION: 95 % | SYSTOLIC BLOOD PRESSURE: 122 MMHG | DIASTOLIC BLOOD PRESSURE: 72 MMHG | TEMPERATURE: 98.3 F | HEART RATE: 52 BPM | BODY MASS INDEX: 23.05 KG/M2 | WEIGHT: 126 LBS | RESPIRATION RATE: 16 BRPM

## 2019-05-03 DIAGNOSIS — F17.200 TOBACCO USE DISORDER: ICD-10-CM

## 2019-05-03 DIAGNOSIS — I25.10 CORONARY ARTERY DISEASE INVOLVING NATIVE CORONARY ARTERY OF NATIVE HEART WITHOUT ANGINA PECTORIS: ICD-10-CM

## 2019-05-03 DIAGNOSIS — Z12.31 VISIT FOR SCREENING MAMMOGRAM: ICD-10-CM

## 2019-05-03 DIAGNOSIS — E78.5 HYPERLIPIDEMIA LDL GOAL <70: ICD-10-CM

## 2019-05-03 DIAGNOSIS — I73.9 PAD (PERIPHERAL ARTERY DISEASE) (H): ICD-10-CM

## 2019-05-03 DIAGNOSIS — I10 ESSENTIAL HYPERTENSION: ICD-10-CM

## 2019-05-03 PROCEDURE — 99214 OFFICE O/P EST MOD 30 MIN: CPT | Performed by: INTERNAL MEDICINE

## 2019-05-03 PROCEDURE — 77067 SCR MAMMO BI INCL CAD: CPT | Mod: TC

## 2019-05-03 RX ORDER — NITROGLYCERIN 0.4 MG/1
0.4 TABLET SUBLINGUAL SEE ADMIN INSTRUCTIONS
Qty: 30 TABLET | Refills: 11 | Status: SHIPPED | OUTPATIENT
Start: 2019-05-03 | End: 2021-05-13

## 2019-05-03 ASSESSMENT — ANXIETY QUESTIONNAIRES
3. WORRYING TOO MUCH ABOUT DIFFERENT THINGS: NOT AT ALL
GAD7 TOTAL SCORE: 0
1. FEELING NERVOUS, ANXIOUS, OR ON EDGE: NOT AT ALL
6. BECOMING EASILY ANNOYED OR IRRITABLE: NOT AT ALL
2. NOT BEING ABLE TO STOP OR CONTROL WORRYING: NOT AT ALL
7. FEELING AFRAID AS IF SOMETHING AWFUL MIGHT HAPPEN: NOT AT ALL
IF YOU CHECKED OFF ANY PROBLEMS ON THIS QUESTIONNAIRE, HOW DIFFICULT HAVE THESE PROBLEMS MADE IT FOR YOU TO DO YOUR WORK, TAKE CARE OF THINGS AT HOME, OR GET ALONG WITH OTHER PEOPLE: NOT DIFFICULT AT ALL
5. BEING SO RESTLESS THAT IT IS HARD TO SIT STILL: NOT AT ALL

## 2019-05-03 ASSESSMENT — PATIENT HEALTH QUESTIONNAIRE - PHQ9
5. POOR APPETITE OR OVEREATING: NOT AT ALL
SUM OF ALL RESPONSES TO PHQ QUESTIONS 1-9: 0

## 2019-05-03 NOTE — PROGRESS NOTES
SUBJECTIVE:   Shirley Hendricks is a 58 year old female who presents to clinic today for the following   health issues:    Hyperlipidemia Follow-Up      Rate your low fat/cholesterol diet?: not monitoring fat    Taking statin?  Yes, no muscle aches from statin    Other lipid medications/supplements?:  none    Hypertension Follow-up      Outpatient blood pressures are not being checked.    Low Salt Diet: not monitoring salt      Amount of exercise or physical activity: None    Problems taking medications regularly: No    Medication side effects: none    Diet: regular (no restrictions)    Pt's past medical history, family history, habits, medications and allergies were reviewed with the patient today.  See snap shot for  HCM status. Most recent lab results reviewed with pt. Problem list and histories reviewed & adjusted, as indicated.  Additional history as below:    The patient last seen in clinic, she was having some BPPV and amlodipine was added by Dr Peterson for elevated blood pressure at the time of that appointment.  However, patient was seen by the balance clinic and to the results.  She never started taking amlodipine and blood pressure is currently normal with other blood pressure medication regimen.  Patient continues to smoke half a pack of cigarettes per day.   also smokes and patient states she is not motivated to quit at this time despite known history of coronary artery disease and peripheral artery disease.  Patient followed by Dr. Lozano. Last visit Aug 2018 reviewed.  Due for f/u imaging studies with vascular clinic  Patient denies current chest pain, shortness of breath, coughPAD.,  Claudication symptoms.  Request refill of nitroglycerin to have available if needed but is not had to use in the past year per patient.  On statin therapy.  Due for lipid recheck in 3 months     Additional ROS:   Constitutional, HEENT, Cardiovascular, Pulmonary, GI and , Neuro, MSK and Psych review of  "systems/symptoms are otherwise negative or unchanged from previous, except as noted above.      OBJECTIVE:  /72   Pulse 52   Temp 98.3  F (36.8  C) (Oral)   Resp 16   Wt 57.2 kg (126 lb)   SpO2 95%   BMI 23.05 kg/m     Estimated body mass index is 23.05 kg/m  as calculated from the following:    Height as of 3/1/19: 1.575 m (5' 2\").    Weight as of this encounter: 57.2 kg (126 lb).     HENT:  No lesions  Neck: no adenopathy. Thyroid normal to palpation.  Soft left carotid  bruit  Pulm: Lungs clear to auscultation   CV: Regular rates and rhythm  GI: Soft, nontender, Normal active bowel sounds, No hepatosplenomegaly or masses palpable  Ext: Peripheral pulses intact. No edema.     Assessment/Plan: (See plan discussion below for further details)  1. Essential hypertension  controlled with meds    2. Hyperlipidemia LDL goal <70  On statin. Repeat labs 2 mos  - Comprehensive metabolic panel; Future  - Lipid panel reflex to direct LDL Fasting; Future    3. Tobacco use disorder  Pt counselled re: smoking cessation.  Pt to contact the clinic if willing to start prescription treatment. Re-emphasized risk for worsened vascular disease, stroke, MI with tobacco use    4. PAD (peripheral artery disease) (H)  Denies sx. Due for f/u XAVIER, CUS with vascular clinic. Pt to schedule appts    5. Coronary artery disease involving native coronary artery of native heart without angina pectoris  No use past 1 year. No chest pain with exertion. Med refilled to have if needed  - nitroGLYcerin (NITROSTAT) 0.4 MG sublingual tablet; Place 1 tablet (0.4 mg) under the tongue See Admin Instructions for chest pain  Dispense: 30 tablet; Refill: 11    Plan discussion:  Continue current meds  Call  [phone] or use [facility] to schedule a future lab appointment  fasting in  Early July  Follow up in 6 months (November). Earlier as needed  Pt counselled re: smoking cessation.  Pt to contact the clinic if willing to start prescription " treatment  Pt to schedule appt with Dr Lozano for follow-up and imaging              Vasquez Benoit MD  Internal Medicine Department  [facility]

## 2019-05-03 NOTE — PATIENT INSTRUCTIONS
Continue current meds  Call  [phone] or use [facility] to schedule a future lab appointment  fasting in  Early July  Follow up in 6 months (November). Earlier as needed  Pt counselled re: smoking cessation.  Pt to contact the clinic if willing to start prescription treatment  Pt to schedule appt with Dr Lozano for follow-up and imaging

## 2019-05-04 ASSESSMENT — ANXIETY QUESTIONNAIRES: GAD7 TOTAL SCORE: 0

## 2019-06-24 DIAGNOSIS — I73.9 PERIPHERAL VASCULAR DISEASE (H): ICD-10-CM

## 2019-06-25 RX ORDER — CLOPIDOGREL BISULFATE 75 MG/1
75 TABLET ORAL DAILY
Qty: 90 TABLET | Refills: 3 | Status: SHIPPED | OUTPATIENT
Start: 2019-06-25 | End: 2020-06-04

## 2019-06-25 NOTE — TELEPHONE ENCOUNTER
"Requested Prescriptions   Pending Prescriptions Disp Refills     clopidogrel (PLAVIX) 75 MG tablet [Pharmacy Med Name: Clopidogrel Bisulfate Oral Tablet 75 MG] 90 tablet 1     Sig: Take 1 tablet (75 mg) by mouth daily   Last Written Prescription Date:  8/7/2018  Last Fill Quantity: 90,  # refills: 2   Last Office Visit: 5/3/2019   Future Office Visit:         Plavix Failed - 6/24/2019  9:26 AM        Failed - No active PPI on record unless is Protonix        Passed - Normal HGB on file in past 12 months     Recent Labs   Lab Test 02/28/19  1023   HGB 13.1               Passed - Normal Platelets on file in past 12 months     Recent Labs   Lab Test 02/01/19  0845                  Passed - Recent (12 mo) or future (30 days) visit within the authorizing provider's specialty     Patient had office visit in the last 12 months or has a visit in the next 30 days with authorizing provider or within the authorizing provider's specialty.  See \"Patient Info\" tab in inbasket, or \"Choose Columns\" in Meds & Orders section of the refill encounter.              Passed - Medication is active on med list        Passed - Patient is age 18 or older        Passed - No active pregnancy on record        Passed - No positive pregnancy test in past 12 months          "

## 2019-06-25 NOTE — TELEPHONE ENCOUNTER
Routing refill request to provider for review/approval because:  Recommended PPI ( Protonix) not on file.    Simona KHAN RN, BSN, PHN

## 2019-07-25 ENCOUNTER — HOSPITAL ENCOUNTER (OUTPATIENT)
Dept: PHYSICAL THERAPY | Facility: CLINIC | Age: 59
Setting detail: THERAPIES SERIES
End: 2019-07-25
Attending: INTERNAL MEDICINE
Payer: COMMERCIAL

## 2019-07-25 PROCEDURE — 97164 PT RE-EVAL EST PLAN CARE: CPT | Mod: GP | Performed by: PHYSICAL THERAPIST

## 2019-07-25 PROCEDURE — 95992 CANALITH REPOSITIONING PROC: CPT | Mod: GP | Performed by: PHYSICAL THERAPIST

## 2019-07-25 NOTE — PROGRESS NOTES
07/25/19 1021   Quick Adds   Quick Adds Vestibular Eval   Type of Visit OP PT Re-evaluation   General Information   Start of Care Date 04/24/19   Referring Physician Lizeth Peterson MD   Orders Evaluate and Treat as Indicated   Order Date 04/17/19   Medical Diagnosis Benign paroxysmal positional vertigo of right ear H81.11    Onset of illness/injury or Date of Surgery 04/01/19  (recurrence one week ago )   Pertinent history of current vestibular problem (include personal factors and/or comorbidities that impact the POC)  Migraines;Prior concussion(s)   Pertinent history of current problem (include personal factors and/or comorbidities that impact the POC) Patient was originally seen in April and treated at [facility] for BPPV. She was symptom free at that time so did not return. About one week ago she reports sudden onset of vertigo. It comes on getting out of bed, laying down in bed, looking up and bending over. She feels vertigo is worse this time moving to the right vs the left as before. She has had nausea but has not vomited. She denies hearing changes or tinnitus. Balance seems off when walking deloris getting up to use the bathroom in the middle of the night. She denies medication changes. She has not been taking any meds for dizziness. She denies recent illness.   Pertinent Visual History  no visual changes. 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 No   Have you fallen and had an  "injury in the past year? No   Is patient a fall risk? Yes   Fall screen comments pt visibly less steady while walking, arms in high guard, wide VINAY. vertigo increases fall risk.   Abuse Screen (yes response referral indicated)   Feels Unsafe at Home or Work/School no   Feels Threatened by Someone no   Does Anyone Try to Keep You From Having Contact with Others or Doing Things Outside Your Home? no   Physical Signs of Abuse Present no   System Outcome Measures   Outcome Measures BPPV   Dizziness Handicap Inventory (score out of 100) A decrease in score by 17.18 or greater indicates a clinically significant change in symptoms. 34   Pain   Patient currently in pain No   Vitals Signs   Heart Rate 57   Blood Pressure 141/93  (after CRM)   Cognitive Status Examination   Orientation orientation to person, place and time   Level of Consciousness alert   Follows Commands and Answers Questions 100% of the time   Personal Safety and Judgment intact   Posture   Posture Forward head position;Protracted shoulders;Kyphosis   Bed Mobility   Bed Mobility Comments independent   Transfer Skills   Transfer Comments independent   Gait   Gait Comments wide VINAY, slow speed, reduced arm swing   Gait Special Tests   Gait Special Tests DYNAMIC GAIT INDEX   Cervicogenic Screen   Neck ROM sufficient for positional testing   Oculomotor Exam   Smooth Pursuit Normal   Saccades Normal   VOR Normal   Rapid Head Thrust Corrective Saccade L head thrust   Infrared Goggle Exam or Frenzel Lense Exam   Vestibular Suppressant in Last 24 Hours? No   Exam completed with Infrared Goggles   Spontaneous Nystagmus Negative   Gaze Evoked Nystagmus Negative   [place]-Hallpike (right) Upbeating R torsional   [place]-Hallpike (right) comments short duration <30\" with c/o vertigo   [place]-Hallpike (Left) Upbeating L torsional   [place]-Hallpike (left) comments short duration <30\" with c/o vertigo   [facility] Supine Roll Test (Right) Upbeating R torsional   [facility] Supine Roll Test (Right) " "Comments short duration <30\" with c/o vertigo   [facility] Supine Roll Test (Left) Negative   BPPV Canal(s) L Posterior;R Posterior   BPPV Type Canalithasis   Planned Therapy Interventions   Planned Therapy Interventions neuromuscular re-education;other (see comments)   Planned Therapy Interventions Comment CRM   Clinical Impression   Criteria for Skilled Therapeutic Interventions Met yes, treatment indicated   PT Diagnosis s/s B BPPV   Influenced by the following impairments intermittent vertigo, nausea and imbalance   Functional limitations due to impairments bed mobility, household and work tasks that require looking up or bending over or looking up   Clinical Presentation Evolving/Changing   Clinical Presentation Rationale BPPV now B   Clinical Decision Making (Complexity) Moderate complexity   Therapy Frequency 1 time/week   Predicted Duration of Therapy Intervention (days/wks) 5 weeks   Risk & Benefits of therapy have been explained Yes   Patient, Family & other staff in agreement with plan of care Yes   Education Assessment   Barriers to Learning No barriers   Goal 1   Goal Description Patient will score 0/100 on DHI indicating resolution of dizziness to be able to care for herself and her spouse safely without restriction.   Target Date 08/29/19   Goal Identifier 1   Goal 2   Goal Description Patient will have negative s/s of BPPV for safety with bed mobility.   Target Date 08/29/19   Goal Identifier 2   Total Evaluation Time   PT Eval, Re-eval Minutes ([id number]) 30     "

## 2019-07-30 ENCOUNTER — HOSPITAL ENCOUNTER (OUTPATIENT)
Dept: PHYSICAL THERAPY | Facility: CLINIC | Age: 59
Setting detail: THERAPIES SERIES
End: 2019-07-30
Attending: INTERNAL MEDICINE
Payer: COMMERCIAL

## 2019-07-30 PROCEDURE — 97164 PT RE-EVAL EST PLAN CARE: CPT | Mod: GP | Performed by: PHYSICAL THERAPIST

## 2019-07-30 NOTE — PROGRESS NOTES
07/30/19 1300   Quick Adds   Quick Adds Vestibular Eval   Type of Visit OP PT Re-evaluation   General Information   Start of Care Date 04/24/19   Referring Physician Lizeth Peterson MD   Orders Evaluate and Treat as Indicated   Order Date 04/17/19   Medical Diagnosis Benign paroxysmal positional vertigo of right ear H81.11    Onset of illness/injury or Date of Surgery 04/01/19  (recurrence in mid July)   Pertinent history of current vestibular problem (include personal factors and/or comorbidities that impact the POC)  Migraines;Prior concussion(s)   Pertinent history of current problem (include personal factors and/or comorbidities that impact the POC) Patient reports she has been feeling much better since her visit last week. She still gets some dizziness with bending and looking up at times so is careful. She was able to mow the lawn yesterday without a problem. She has not had any vertigo with bed mobility. 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 She only feels off balance at times walking over uneven sidewalks.   Fall Risk Screen   Fall screen completed by PT   Have you fallen 2 or more times in the past year? No   Have you fallen and had an injury in the past year? No   Is patient a fall risk? No   Fall screen comments based on DGI    System Outcome Measures   Outcome Measures BPPV   Dizziness Handicap Inventory (score out of 100) A decrease in score by 17.18 or greater indicates a clinically significant change in symptoms. 8   Bed Mobility   Bed Mobility Comments independent   Transfer Skills   Transfer Comments independent   Gait   Gait Comments patient is ambulating in normal gait pattern with recip arm swing and no path deviation   Gait Special Tests   Gait Special Tests DYNAMIC GAIT INDEX   Gait Special Tests Dynamic Gait Index   Comments 12/12 on 4 item DGI (9 or less indicates increased fall risk). Improved from eval when was 10/12.   Balance Special Tests   Balance Special Tests Modified CTSIB  Conditions   Balance Special Tests Modified CTSIB Conditions   Condition 1, seconds 30 Seconds   Condition 2, seconds 30 Seconds   Condition 4, seconds 30 Seconds   Condition 5, seconds 0 Seconds  (pt reports imbalance d/t to poor footwear and foot deformity)   Coordination   Coordination no deficits were identified   Cervicogenic Screen   Neck ROM sufficient for positional testing   Infrared Goggle Exam or Frenzel Lense Exam   Vestibular Suppressant in Last 24 Hours? No   Exam completed with Infrared Goggles   Spontaneous Nystagmus Negative   [place]-Hallpike (right) Negative   [place]-Hallpike (Left) Negative   [facility] Supine Roll Test (Right) Negative   [facility] Supine Roll Test (Left) Negative   Clinical Impression   Criteria for Skilled Therapeutic Interventions Met yes, treatment indicated   PT Diagnosis postural instability   Influenced by the following impairments reduced balance on compliant surfaces and with eyes closed   Functional limitations due to impairments walking on uneven terrain   Risk & Benefits of therapy have been explained Yes   Patient, Family & other staff in agreement with plan of care Yes   Clinical Impression Comments Patient was re-evaluated for continued BPPV due to suspected B involvement based on last weeks exam and report of occasional dizziness in past week despite significant improvement. IR exam was negative with no s/s of active BPPV in all canals. Patient's gait stability has returned to WNL without increased fall risk. She does demonstrate some postural instability on compliant surfaces and with eyes closed but is not interested in pursuing additional balance training at this time. She was educated in standing balance with EC on floor and EO on foam for practice at home. Patients chart will be held open over the next month in case vertigo were to return. If not symptoms arise, she will be discharged end of August.   Goal 1   Goal Description Patient will score 0/100 on DHI indicating  resolution of dizziness to be able to care for herself and her spouse safely without restriction.   Target Date 08/29/19   Goal Identifier 1   Date Met   (nearly met, score 8/100)   Goal 2   Goal Description Patient will have negative s/s of BPPV for safety with bed mobility.   Target Date 08/29/19   Goal Identifier 2   Date Met 07/30/19   Total Evaluation Time   PT Eval, Re-eval Minutes ([id number]) 25

## 2019-09-05 DIAGNOSIS — K21.00 REFLUX ESOPHAGITIS: ICD-10-CM

## 2019-09-06 NOTE — TELEPHONE ENCOUNTER
Routing refill request to provider for review/approval because:  plavix on current med list  Osteoporosis on problem list  Allergy warning

## 2019-09-06 NOTE — TELEPHONE ENCOUNTER
"Requested Prescriptions   Pending Prescriptions Disp Refills     omeprazole (PRILOSEC) 20 MG DR capsule [Pharmacy Med Name: Omeprazole Oral Capsule Delayed Release 20 MG] 90 capsule 2     Sig: TAKE ONE CAPSULE BY MOUTH DAILY AS NEEDED (not using daily)   Last Written Prescription Date:  8/7/2018  Last Fill Quantity: 90,  # refills: 3   Last Office Visit: 5/3/2019   Future Office Visit:         PPI Protocol Failed - 9/5/2019 12:27 PM        Failed - Not on Clopidogrel (unless Pantoprazole ordered)        Failed - No diagnosis of osteoporosis on record        Passed - Recent (12 mo) or future (30 days) visit within the authorizing provider's specialty     Patient had office visit in the last 12 months or has a visit in the next 30 days with authorizing provider or within the authorizing provider's specialty.  See \"Patient Info\" tab in inbasket, or \"Choose Columns\" in Meds & Orders section of the refill encounter.              Passed - Medication is active on med list        Passed - Patient is age 18 or older        Passed - No active pregnacy on record        Passed - No positive pregnancy test in past 12 months          "

## 2019-09-18 ENCOUNTER — APPOINTMENT (OUTPATIENT)
Dept: MRI IMAGING | Facility: CLINIC | Age: 59
End: 2019-09-18
Attending: EMERGENCY MEDICINE
Payer: COMMERCIAL

## 2019-09-18 ENCOUNTER — HOSPITAL ENCOUNTER (EMERGENCY)
Facility: CLINIC | Age: 59
Discharge: HOME | End: 2019-09-18
Attending: EMERGENCY MEDICINE | Admitting: EMERGENCY MEDICINE
Payer: COMMERCIAL

## 2019-09-18 VITALS
HEART RATE: 61 BPM | DIASTOLIC BLOOD PRESSURE: 78 MMHG | WEIGHT: 127 LBS | RESPIRATION RATE: 16 BRPM | BODY MASS INDEX: 23.37 KG/M2 | SYSTOLIC BLOOD PRESSURE: 139 MMHG | OXYGEN SATURATION: 94 % | TEMPERATURE: 97.8 F | HEIGHT: 62 IN

## 2019-09-18 DIAGNOSIS — R42 VERTIGO: ICD-10-CM

## 2019-09-18 DIAGNOSIS — R42 DIZZINESS: ICD-10-CM

## 2019-09-18 LAB
ALBUMIN SERPL-MCNC: 3.8 G/DL (ref 3.4–5)
ALP SERPL-CCNC: 64 U/L (ref 40–150)
ALT SERPL W P-5'-P-CCNC: 40 U/L (ref 0–50)
ANION GAP SERPL CALCULATED.3IONS-SCNC: 5 MMOL/L (ref 3–14)
AST SERPL W P-5'-P-CCNC: 23 U/L (ref 0–45)
BASOPHILS # BLD AUTO: 0 10E9/L (ref 0–0.2)
BASOPHILS NFR BLD AUTO: 0.7 %
BILIRUB SERPL-MCNC: 0.4 MG/DL (ref 0.2–1.3)
BUN SERPL-MCNC: 9 MG/DL (ref 7–30)
CALCIUM SERPL-MCNC: 8.8 MG/DL (ref 8.5–10.1)
CHLORIDE SERPL-SCNC: 99 MMOL/L (ref 94–109)
CO2 SERPL-SCNC: 28 MMOL/L (ref 20–32)
CREAT SERPL-MCNC: 0.48 MG/DL (ref 0.52–1.04)
DIFFERENTIAL METHOD BLD: NORMAL
EOSINOPHIL # BLD AUTO: 0.1 10E9/L (ref 0–0.7)
EOSINOPHIL NFR BLD AUTO: 2 %
ERYTHROCYTE [DISTWIDTH] IN BLOOD BY AUTOMATED COUNT: 13.7 % (ref 10–15)
GFR SERPL CREATININE-BSD FRML MDRD: >90 ML/MIN/{1.73_M2}
GLUCOSE SERPL-MCNC: 89 MG/DL (ref 70–99)
HCT VFR BLD AUTO: 41.4 % (ref 35–47)
HGB BLD-MCNC: 14.2 G/DL (ref 11.7–15.7)
IMM GRANULOCYTES # BLD: 0 10E9/L (ref 0–0.4)
IMM GRANULOCYTES NFR BLD: 0.2 %
INTERPRETATION ECG - MUSE: NORMAL
LYMPHOCYTES # BLD AUTO: 1.1 10E9/L (ref 0.8–5.3)
LYMPHOCYTES NFR BLD AUTO: 21 %
MCH RBC QN AUTO: 32.1 PG (ref 26.5–33)
MCHC RBC AUTO-ENTMCNC: 34.3 G/DL (ref 31.5–36.5)
MCV RBC AUTO: 94 FL (ref 78–100)
MONOCYTES # BLD AUTO: 0.4 10E9/L (ref 0–1.3)
MONOCYTES NFR BLD AUTO: 7.6 %
NEUTROPHILS # BLD AUTO: 3.7 10E9/L (ref 1.6–8.3)
NEUTROPHILS NFR BLD AUTO: 68.5 %
NRBC # BLD AUTO: 0 10*3/UL
NRBC BLD AUTO-RTO: 0 /100
PLATELET # BLD AUTO: 190 10E9/L (ref 150–450)
POTASSIUM SERPL-SCNC: 3.9 MMOL/L (ref 3.4–5.3)
PROT SERPL-MCNC: 7.5 G/DL (ref 6.8–8.8)
RBC # BLD AUTO: 4.42 10E12/L (ref 3.8–5.2)
SODIUM SERPL-SCNC: 132 MMOL/L (ref 133–144)
TROPONIN I SERPL-MCNC: <0.015 UG/L (ref 0–0.04)
WBC # BLD AUTO: 5.4 10E9/L (ref 4–11)

## 2019-09-18 PROCEDURE — 70553 MRI BRAIN STEM W/O & W/DYE: CPT

## 2019-09-18 PROCEDURE — 80053 COMPREHEN METABOLIC PANEL: CPT | Performed by: EMERGENCY MEDICINE

## 2019-09-18 PROCEDURE — 96375 TX/PRO/DX INJ NEW DRUG ADDON: CPT

## 2019-09-18 PROCEDURE — A9585 GADOBUTROL INJECTION: HCPCS | Performed by: EMERGENCY MEDICINE

## 2019-09-18 PROCEDURE — 25000128 H RX IP 250 OP 636: Performed by: EMERGENCY MEDICINE

## 2019-09-18 PROCEDURE — 70549 MR ANGIOGRAPH NECK W/O&W/DYE: CPT

## 2019-09-18 PROCEDURE — 85025 COMPLETE CBC W/AUTO DIFF WBC: CPT | Performed by: EMERGENCY MEDICINE

## 2019-09-18 PROCEDURE — 70544 MR ANGIOGRAPHY HEAD W/O DYE: CPT | Mod: XS

## 2019-09-18 PROCEDURE — 99285 EMERGENCY DEPT VISIT HI MDM: CPT | Mod: 25

## 2019-09-18 PROCEDURE — 96374 THER/PROPH/DIAG INJ IV PUSH: CPT | Mod: 59

## 2019-09-18 PROCEDURE — 25000132 ZZH RX MED GY IP 250 OP 250 PS 637: Performed by: EMERGENCY MEDICINE

## 2019-09-18 PROCEDURE — 96361 HYDRATE IV INFUSION ADD-ON: CPT

## 2019-09-18 PROCEDURE — 93005 ELECTROCARDIOGRAM TRACING: CPT

## 2019-09-18 PROCEDURE — 25500064 ZZH RX 255 OP 636: Performed by: EMERGENCY MEDICINE

## 2019-09-18 PROCEDURE — 84484 ASSAY OF TROPONIN QUANT: CPT | Performed by: EMERGENCY MEDICINE

## 2019-09-18 RX ORDER — DIPHENHYDRAMINE HYDROCHLORIDE 50 MG/ML
25 INJECTION INTRAMUSCULAR; INTRAVENOUS ONCE
Status: DISCONTINUED | OUTPATIENT
Start: 2019-09-18 | End: 2019-09-18

## 2019-09-18 RX ORDER — LORAZEPAM 2 MG/ML
1 INJECTION INTRAMUSCULAR ONCE
Status: COMPLETED | OUTPATIENT
Start: 2019-09-18 | End: 2019-09-18

## 2019-09-18 RX ORDER — ONDANSETRON 2 MG/ML
4 INJECTION INTRAMUSCULAR; INTRAVENOUS EVERY 30 MIN PRN
Status: DISCONTINUED | OUTPATIENT
Start: 2019-09-18 | End: 2019-09-18 | Stop reason: HOSPADM

## 2019-09-18 RX ORDER — MECLIZINE HYDROCHLORIDE 25 MG/1
25 TABLET ORAL ONCE
Status: COMPLETED | OUTPATIENT
Start: 2019-09-18 | End: 2019-09-18

## 2019-09-18 RX ORDER — GADOBUTROL 604.72 MG/ML
10 INJECTION INTRAVENOUS ONCE
Status: COMPLETED | OUTPATIENT
Start: 2019-09-18 | End: 2019-09-18

## 2019-09-18 RX ORDER — SODIUM CHLORIDE 9 MG/ML
1000 INJECTION, SOLUTION INTRAVENOUS CONTINUOUS
Status: DISCONTINUED | OUTPATIENT
Start: 2019-09-18 | End: 2019-09-18 | Stop reason: HOSPADM

## 2019-09-18 RX ORDER — ONDANSETRON 4 MG/1
4 TABLET, ORALLY DISINTEGRATING ORAL EVERY 6 HOURS PRN
Qty: 10 TABLET | Refills: 0 | Status: SHIPPED | OUTPATIENT
Start: 2019-09-18 | End: 2019-09-24

## 2019-09-18 RX ORDER — MECLIZINE HCL 12.5 MG 12.5 MG/1
25 TABLET ORAL 4 TIMES DAILY PRN
Qty: 30 TABLET | Refills: 0 | Status: SHIPPED | OUTPATIENT
Start: 2019-09-18 | End: 2019-09-24

## 2019-09-18 RX ORDER — LORAZEPAM 0.5 MG/1
.5-1 TABLET ORAL EVERY 6 HOURS PRN
Qty: 10 TABLET | Refills: 0 | Status: SHIPPED | OUTPATIENT
Start: 2019-09-18 | End: 2019-09-24

## 2019-09-18 RX ADMIN — LORAZEPAM 1 MG: 2 INJECTION INTRAMUSCULAR; INTRAVENOUS at 07:54

## 2019-09-18 RX ADMIN — SODIUM CHLORIDE 1000 ML: 9 INJECTION, SOLUTION INTRAVENOUS at 07:52

## 2019-09-18 RX ADMIN — MECLIZINE HYDROCHLORIDE 25 MG: 25 TABLET ORAL at 07:55

## 2019-09-18 RX ADMIN — SODIUM CHLORIDE 1000 ML: 9 INJECTION, SOLUTION INTRAVENOUS at 07:48

## 2019-09-18 RX ADMIN — ONDANSETRON 4 MG: 2 INJECTION INTRAMUSCULAR; INTRAVENOUS at 07:52

## 2019-09-18 RX ADMIN — GADOBUTROL 10 ML: 604.72 INJECTION INTRAVENOUS at 08:59

## 2019-09-18 ASSESSMENT — ENCOUNTER SYMPTOMS
SHORTNESS OF BREATH: 0
HEADACHES: 0
LIGHT-HEADEDNESS: 1
NUMBNESS: 0
NAUSEA: 1

## 2019-09-18 ASSESSMENT — MIFFLIN-ST. JEOR: SCORE: 1099.32

## 2019-09-18 NOTE — ED PROVIDER NOTES
"Call the frons  History     Chief Complaint:  Light headedness    HPI   Shirley Hendricks is a 58 year old female, with a history of hyperlipidemia, hypertension, MI, stroke, embolism/thrombosis of unspecified artery, carotid artery stenosis, PAD, PVD, hyponatremia, who is currently an every day smoker, and who is currently on Plavix, who presents to the ED for the evaluation of light headedness. The patient states that she was driving to work this morning when she said the road \"looked and felt wobbly.\" She also reports feeling nauseous and light headed; and that looking to the sides \"felt weird and slow moving.\" The patient has had two episodes of vertigo this year, in July and August, but states that this feels different than her previous episodes. The patient denies any headache, numbness, tingling, visual disturbance, chest pain, or shortness of breath.  She indicates however that she has had trouble walking since this started.    Allergies:  Contrast dye, anaphylaxis  Pantoprazole, edema  Penicillins, itching     Medications:    Proair HFA  Aspirin  Lipitor  Plavix  Prolia  Breo Ellipta  Prinivil  Toprol-XL  Nitrostat  Prilosec  Vitamin C    Past Medical History:    Anxiety  COPD  Discoid lupus erythematous of eyelid  Embolism and thrombosis of unspecified artery  GERD  Hyperlipidemia  Hypertension  Lupus  Mild major depression  MI  Osteoarthrosis  Peripheral artery disease  PVD  Asthma  Vitamin C  Hyponatremia  DDD  Stroke  Carotid artery stenosis    Past Surgical History:    Angiogram  Fabric wrapping of abdominal aneurysm  Angioplasty with stent right femoral artery  Emergent left groin exploration with over sewing of bleeding angiographic site  Endarterectomy of common femoral-proximal superficial femoral artery with greater saphenous vein patch angioplasty  Occluded left common iliac and external iliac arteries were successfully revascularized with stenting to 8 and 7 mm  Cardiac catheterization  Left " "salpingectomy  Laparoscopic cholecystectomy  Left knee surgery    Family History:    Bladder cancer, mother  MI, father  Clotting disorder, brother    Social History:  Current every day smoker, 0.25 packs per day for 40 years.  Positive for alcohol use.   Negative for drug use.  Marital Status:   [2]     Review of Systems   Eyes: Negative for visual disturbance.   Respiratory: Negative for shortness of breath.    Cardiovascular: Negative for chest pain.   Gastrointestinal: Positive for nausea.   Neurological: Positive for light-headedness. Negative for numbness and headaches.   All other systems reviewed and are negative.      Physical Exam     Patient Vitals for the past 24 hrs:   BP Temp Temp src Heart Rate Resp SpO2 Height Weight   09/18/19 0705 (!) 190/91 97.8  F (36.6  C) Oral 66 16 99 % 1.575 m (5' 2\") 57.6 kg (127 lb)     Physical Exam  Nursing note and vitals reviewed.  Constitutional:  Oriented to person, place, and time. Vital signs are normal. Cooperative.   HENT:   Mouth/Throat:   Oropharynx is clear and moist and mucous membranes are normal.   Eyes:    Conjunctivae are normal.      Pupils are equal, round, and reactive to light.   Neck:    Trachea normal and normal range of motion.   Cardiovascular:  Normal rate, regular rhythm and normal heart sounds.    Pulses:   Radial pulses are 2+ bilaterally. Dorsalis pedis pulses are 2+ bilaterally.   Pulmonary/Chest:  Effort normal.   Abdominal:   Soft. Normal appearance and bowel sounds are normal.      Exhibits no distension. There is no tenderness.      There is no rebound and no CVA tenderness.   Musculoskeletal:  Extremities atraumatic x 4.   Lymphadenopathy:  No cervical adenopathy.   Neurological:   Alert and oriented to person, place, and time. Normal strength and normal reflexes. Not disoriented. No cranial nerve deficit or sensory deficit. Displays a negative Romberg sign. Coordination and gait normal. GCS eye subscore is 4. GCS verbal subscore " is 5. GCS motor subscore is 6. Visual fields intact. No pronator drift. Normal Finger-to-Nose and Rapid Alternating Movement. Unable to do heel to shin, slightly unsteady gait. Subtle nystagmus present.   Skin:    Skin is warm, dry and intact.      No rash noted.   Psychiatric:   Normal mood and affect. Speech is normal and behavior is normal. Judgment normal. Cognition and memory are normal.    Emergency Department Course   ECG:  Indication: Light headedness  Time: 0729  Vent. Rate 60 bpm. [place] interval 144. QRS duration 82. QT/QTc 420/420. P-R-T axis 37 50 66.  Normal sinus rhythm. Low voltage QRS. Septal infarct, age undetermined. Abnormal ECG. No significant change compared to EKG dated 1/20/2019. Read time: 0733    Imaging:  Radiographic findings were communicated with the patient who voiced understanding of the findings.  MR Neck w/o and w/ contrast angiogram:   1. No acute abnormality.  2. Plaquing at the left greater than right carotid bifurcations as  detailed, unchanged as per radiology     MR Brain w/o and w/ contrast:   1. No evidence of recent ischemia, hemorrhage, or mass.  2. Bifrontal encephalomalacia, likely related to old trauma,  unchanged as per radiology    MR Head w/o contrast angiogram:   Motion limited exam without appreciable abnormality as per radiology    Laboratory:  CBC: WBC: 5.4, HGB: 14.2, PLT: 190  CMP: NA: 132 (L), Creatinine: 0.48 (L), o/w WNL   0739 Troponin: <0.015    Interventions:  0748 NS 1L IV Bolus  0752 Zofran injection 4 mg IV  0754 Ativan injection 1 mg IV  0755 Antivert tablet 25 mg PO  0859 Gadavist injection 10 mL IV    Emergency Department Course:  Nursing notes and vitals reviewed. (0705) I performed an exam of the patient as documented above.     IV inserted. Medicine administered as documented above. Blood drawn. This was sent to the lab for further testing, results above.     The patient was sent for a brain MRI, head angiogram, and neck angiogram with and without  contrast while in the emergency department, findings above.     1055 I rechecked the patient and discussed the results of her workup thus far.     Findings and plan explained to the Patient. Patient discharged home with instructions regarding supportive care, medications, and reasons to return. The importance of close follow-up was reviewed. The patient was prescribed Ativan, Antivert, and Zofran ODT.    Impression & Plan    Medical Decision Making:  This is a 58-year-old female who came in for further evaluation of lightheadedness.  She had some difficulty describing her symptoms, and she had only very subtle nystagmus on exam.  She also continues to have some vague symptoms even while lying on the cart.  Based on the history and her exam along with her past medical history, I was suspicious and concerned for a possible stroke and/or vertebrobasilar insufficiency.  Therefore I felt it was best to proceed with the above MRIs in addition to the EKG and blood work.  She was also provided the above interventions to see if those might help.  Her work-up appears unremarkable, which is reassuring.  Therefore I suspect that her symptoms are all related to benign vertigo.  I will send her home with Zofran, meclizine, and Ativan.  She is to follow-up with her physician as soon as possible though and certainly return with any concerns or worsening symptoms.    Diagnosis:    ICD-10-CM   1. Dizziness R42   2. Vertigo R42       Disposition:  The patient was discharged to home.    Discharge Medications:  New Prescriptions    LORAZEPAM (ATIVAN) 0.5 MG TABLET    Take 1-2 tablets (0.5-1 mg) by mouth every 6 hours as needed for vomiting (dizziness)    MECLIZINE (ANTIVERT) 12.5 MG TABLET    Take 2 tablets (25 mg) by mouth 4 times daily as needed for dizziness    ONDANSETRON (ZOFRAN ODT) 4 MG ODT TAB    Take 1 tablet (4 mg) by mouth every 6 hours as needed for nausea     Scribe Disclosure:  I, Mehreen Ayala, am serving as a scribe on  9/18/2019 at 7:05 AM to personally document services performed by Joe Martinez MD based on my observations and the provider's statements to me.     Mehreen Ayala  9/18/2019    EMERGENCY DEPARTMENT       Joe Martinez MD  09/18/19 4081

## 2019-09-18 NOTE — DISCHARGE INSTRUCTIONS
Discharge Instructions  Dizziness (Lightheaded)  Today you were seen for dizziness.  Dizziness can be caused by many things and it can be very difficult to determine the cause of dizziness.  At this time, your provider has found no signs that your dizziness is due to a serious or life-threatening condition. However, sometimes there is a serious problem that does not show up right away, and it is important for you to follow up with your regular provider as instructed.  Generally, every Emergency Department visit should have a follow-up clinic visit with either a primary or a specialty clinic/provider. Please follow-up as instructed by your emergency provider today.      Return to the Emergency Department if:    You pass out (fainting or falling out), especially during exercise.    You develop chest pain, chest pressure or difficulty breathing.  Your feel an irregular heartbeat.  You have excessive vaginal bleeding, or blood in your stool or vomit (throw up).  You have a high fever.  Your symptoms get worse or more frequent.    If when you begin to feel dizzy or lightheaded, it is important to sit down or lay down immediately to prevent injury from falling.  If you were given a prescription for medicine here today, be sure to read all of the information (including the package insert) that comes with your prescription.  This will include important information about the medicine, its side effects, and any warnings that you need to know about.  The pharmacist who fills the prescription can provide more information and answer questions you may have about the medicine.  If you have questions or concerns that the pharmacist cannot address, please call or return to the Emergency Department.   Remember that you can always come back to the Emergency Department if you are not able to see your regular provider in the amount of time listed above, if you get any new symptoms, or if there is anything that worries  you.      Discharge Instructions  Vertigo  You have been diagnosed with vertigo.  This is a dizzy feeling often described as spinning or that the room is moving around you. You will often have nausea (sick to your stomach), vomiting (throwing up), and balance problems with it.  Vertigo is usually caused by a problem in the inner ear which helps control your balance.  Many things can cause vertigo, including calcium collections in the inner ear, a virus infection of the inner ear, concussion, migraine, and some medicines.  Luckily, these causes are not life threatening and will eventually go away.  However, sometimes there is a serious problem that does not show up right away.  Generally, every Emergency Department visit should have a follow-up clinic visit with either a primary or a specialty clinic/provider. Please follow-up as instructed by your emergency provider today.  Return to the Emergency Department if you have:  New or severe headache.  Double vision (seeing two of things).  Trouble speaking or hearing.  Weakness or trouble moving/using one side of your body.  Passing out.  Numbness or tingling.  Chest pain.  Vomiting that will not stop.    Treatment:  There are several commonly prescribed medications:  Antihistamines such as meclizine (Antivert ), dimenhydrinate (Dramamine ), or diphenhydramine (Benadryl ).  Prescription anti-nausea medicines, such as promethazine (Phenergan ), metoclopramide (Reglan ), or ondansetron (Zofran ).  Prescription sedative medicines, such as diazepam (Valium ), lorazepam (Ativan ), or clonazepam (Klonopin ).  Most of these medicines make you sleepy, and you should not take them before you work or drive. You should only take prescription medicines to treat severe vertigo symptoms, and you should stop the medicine when your symptoms improve.    Follow Up:  If you have vertigo longer than three days, it is important that you follow up either with your primary provider or an Ear,  Nose, and Throat (ENT) specialist.  You may need further testing to evaluate your vertigo and you may also need  vestibular  therapy which is a special form of physical therapy to make the vertigo go away.    If you were given a prescription for medicine here today, be sure to read all of the information (including the package insert) that comes with your prescription.  This will include important information about the medicine, its side effects, and any warnings that you need to know about.  The pharmacist who fills the prescription can provide more information and answer questions you may have about the medicine.  If you have questions or concerns that the pharmacist cannot address, please call or return to the Emergency Department.     Remember that you can always come back to the Emergency Department if you are not able to see your regular provider in the amount of time listed above, if you get any new symptoms, or if there is anything that worries you.

## 2019-09-18 NOTE — ED AVS SNAPSHOT
Emergency Department  [address] [place] [address]  Phone:  [phone]  Fax:  [phone]                                    Shirley Hendricks   MRN: [id number]    Department:   Emergency Department   Date of Visit:  9/18/2019           After Visit Summary Signature Page    I have received my discharge instructions, and my questions have been answered. I have discussed any challenges I see with this plan with the nurse or doctor.    ..........................................................................................................................................  Patient/Patient Representative Signature      ..........................................................................................................................................  Patient Representative Print Name and Relationship to Patient    ..................................................               ................................................  Date                                   Time    ..........................................................................................................................................  Reviewed by Signature/Title    ...................................................              ..............................................  Date                                               Time          22EPIC Rev 08/18

## 2019-09-23 DIAGNOSIS — I10 ESSENTIAL HYPERTENSION: ICD-10-CM

## 2019-09-24 ENCOUNTER — OFFICE VISIT (OUTPATIENT)
Dept: INTERNAL MEDICINE | Facility: CLINIC | Age: 59
End: 2019-09-24
Payer: COMMERCIAL

## 2019-09-24 VITALS
OXYGEN SATURATION: 95 % | SYSTOLIC BLOOD PRESSURE: 134 MMHG | BODY MASS INDEX: 23.41 KG/M2 | WEIGHT: 128 LBS | HEART RATE: 63 BPM | RESPIRATION RATE: 16 BRPM | DIASTOLIC BLOOD PRESSURE: 70 MMHG | TEMPERATURE: 98 F

## 2019-09-24 DIAGNOSIS — I10 ESSENTIAL HYPERTENSION: ICD-10-CM

## 2019-09-24 DIAGNOSIS — E78.5 HYPERLIPIDEMIA LDL GOAL <70: ICD-10-CM

## 2019-09-24 DIAGNOSIS — F17.200 TOBACCO USE DISORDER: Primary | ICD-10-CM

## 2019-09-24 PROCEDURE — 99214 OFFICE O/P EST MOD 30 MIN: CPT | Performed by: INTERNAL MEDICINE

## 2019-09-24 RX ORDER — ATORVASTATIN CALCIUM 80 MG/1
TABLET, FILM COATED ORAL
Qty: 90 TABLET | Refills: 3 | Status: ON HOLD | OUTPATIENT
Start: 2019-09-24 | End: 2020-06-09

## 2019-09-24 RX ORDER — METOPROLOL SUCCINATE 25 MG/1
25 TABLET, EXTENDED RELEASE ORAL DAILY
Qty: 90 TABLET | Refills: 3 | Status: SHIPPED | OUTPATIENT
Start: 2019-09-24 | End: 2020-02-21 | Stop reason: ALTCHOICE

## 2019-09-24 NOTE — TELEPHONE ENCOUNTER
"Requested Prescriptions   Pending Prescriptions Disp Refills     metoprolol succinate ER (TOPROL-XL) 25 MG 24 hr tablet [Pharmacy Med Name: Metoprolol Succinate ER Oral Tablet Extended Release 24 Hour 25 MG]  Last Written Prescription Date:  08/07/2018  Last Fill Quantity: 90,  # refills: 03   Last Office Visit: 5/3/2019   Future Office Visit:    Next 5 appointments (look out 90 days)    Sep 24, 2019  2:00 PM CDT  Office Visit with Vasquez Benoit MD  [facility] [address] [address] [phone]          90 tablet 2     Sig: Take 1 tablet (25 mg) by mouth daily       Beta-Blockers Protocol Passed - 9/23/2019  9:09 AM        Passed - Blood pressure under 140/90 in past 12 months     BP Readings from Last 3 Encounters:   09/18/19 139/78   05/03/19 122/72   04/17/19 162/76                 Passed - Patient is age 6 or older        Passed - Recent (12 mo) or future (30 days) visit within the authorizing provider's specialty     Patient had office visit in the last 12 months or has a visit in the next 30 days with authorizing provider or within the authorizing provider's specialty.  See \"Patient Info\" tab in inbasket, or \"Choose Columns\" in Meds & Orders section of the refill encounter.              Passed - Medication is active on med list          "

## 2019-09-24 NOTE — PROGRESS NOTES
"Subjective     Shirley Cammy Hendricks is a 58 year old female who presents to clinic today for the following health issues:    HPI   ED/UC Followup:    Facility:  [facility] ED  Date of visit: 09/18/2019  Reason for visit: Dizziness, Vertigo  Current Status: Stable     Pt's past medical history, family history, habits, medications and allergies were reviewed with the patient today.  See snap shot for  HCM status. Most recent lab results reviewed with pt. Problem list and histories reviewed & adjusted, as indicated.  Additional history as below:    ER note reviewed. Had been on a boat the day before. Had been drinking fluids fairly well the day before. Minimal alcohol  Smoking 1/4 ppd cigs still despite rec to stop. \"Felt like I was on a acid trip\" when pt was driving prior to going to ER  Denies vertigo symptoms now.  Balance okay.  No chest pain, shortness of breath, abdominal pain.     Additional ROS:   Constitutional, HEENT, Cardiovascular, Pulmonary, GI and , Neuro, MSK and Psych review of systems/symptoms are otherwise negative or unchanged from previous, except as noted above.      OBJECTIVE:  /74   Pulse 63   Temp 98  F (36.7  C) (Temporal)   Resp 16   Wt 58.1 kg (128 lb)   SpO2 95%   BMI 23.41 kg/m     Estimated body mass index is 23.41 kg/m  as calculated from the following:    Height as of 9/18/19: 1.575 m (5' 2\").    Weight as of this encounter: 58.1 kg (128 lb).  Eye: PERRL, EOMI  HENT: ear canals and TM's normal and nose and mouth without ulcers or lesions   Neck: no adenopathy. Thyroid normal to palpation. No bruits  Pulm: Lungs clear to auscultation with slight prolongation of exp phase  CV: Regular rates and rhythm  GI: Soft, nontender, Normal active bowel sounds.   Ext: Peripheral pulses intact. No edema.  Neuro: Normal strength and tone, sensory exam grossly normal. Neg Romberg. Able to walk HTT forward. Minimal trouble backward. Normal FNF and other hand " [facility]    Assessment/Plan: (See plan discussion below for further details)  1. Essential hypertension  controlled  - metoprolol succinate ER (TOPROL-XL) 25 MG 24 hr tablet; Take 1 tablet (25 mg) by mouth daily  Dispense: 90 tablet; Refill: 3    2. Hyperlipidemia LDL goal <70  Continue statin therapy.  Fasting labs in the next week as previously ordered  - atorvastatin (LIPITOR) 80 MG tablet; TAKE ONE TABLET (80MG) BY MOUTH EVERY EVENING INDICATION:TO LOWER CHOLESTEROL AND TO HELP REDUCE RISK OF REOCURRENCE OF HEAR ATTACK STROKE,A  Dispense: 90 tablet; Refill: 3    3. Tobacco use disorder  See counseling below.  Not interested in quitting at this time    Plan discussion:   Continue current meds  Prescriptions refilled.    Call  [phone] or use [employer] to schedule a future lab appointment  fasting in 1 week.   For fasting labs, please refrain from eating for 8 hours or more.   Drink 2 glasses of water before your lab appointment. It is fine to take your  oral medications on the morning of the lab test as usual  I encourage you to stop all smoking.  If unable to quit on your own, you may either try over the counter nicotine patches/gum, contact clinic if interested in prescription therapy (Nicotine supplement/Chantix/Buproprion) or contact the [place] Quit Plan (1-955.153.3175 or www.quitplan.com)  I would recommend you receive an influenza (flu) vaccine in the Fall  (October or November)         Vasquez Benoit MD  Internal Medicine Department  [facility]    (Chart documentation was completed, in part, with [employer] voice-recognition software. Even though reviewed, some grammatical, spelling, and word errors may remain.)

## 2019-09-24 NOTE — LETTER
[facility]  [address] [address]  [phone]      9/24/2019     Regarding:  Shirley Hendricks  [address] [facility] [address]        To whom  it may concern,  Shirley Hendricks was seen in clinic today. She may return to work without restriction starting 0/25/19. If you have further questions regarding this matter, please feel free to contact me at [phone]    Sincerely,            Vasquez Benoit MD  Internal Medicine

## 2019-09-24 NOTE — PATIENT INSTRUCTIONS
Continue current meds  Prescriptions refilled.    Call  [phone] or use [employer] to schedule a future lab appointment  fasting in 1 weeks.   For fasting labs, please refrain from eating for 8 hours or more.   Drink 2 glasses of water before your lab appointment. It is fine to take your  oral medications on the morning of the lab test as usual  I encourage you to stop all smoking.  If unable to quit on your own, you may either try over the counter nicotine patches/gum, contact clinic if interested in prescription therapy (Nicotine supplement/Chantix/Buproprion) or contact the [place] Quit Plan (1-931.508.9355 or www.quitplan.com)  I would recommend you receive an influenza (flu) vaccine in the Fall  (October or November)

## 2019-09-25 DIAGNOSIS — E78.5 HYPERLIPIDEMIA LDL GOAL <70: ICD-10-CM

## 2019-09-25 LAB
ALBUMIN SERPL-MCNC: 3.6 G/DL (ref 3.4–5)
ALP SERPL-CCNC: 66 U/L (ref 40–150)
ALT SERPL W P-5'-P-CCNC: 35 U/L (ref 0–50)
ANION GAP SERPL CALCULATED.3IONS-SCNC: 2 MMOL/L (ref 3–14)
AST SERPL W P-5'-P-CCNC: 16 U/L (ref 0–45)
BILIRUB SERPL-MCNC: 0.4 MG/DL (ref 0.2–1.3)
BUN SERPL-MCNC: 9 MG/DL (ref 7–30)
CALCIUM SERPL-MCNC: 8.8 MG/DL (ref 8.5–10.1)
CHLORIDE SERPL-SCNC: 104 MMOL/L (ref 94–109)
CHOLEST SERPL-MCNC: 132 MG/DL
CO2 SERPL-SCNC: 29 MMOL/L (ref 20–32)
CREAT SERPL-MCNC: 0.58 MG/DL (ref 0.52–1.04)
GFR SERPL CREATININE-BSD FRML MDRD: >90 ML/MIN/{1.73_M2}
GLUCOSE SERPL-MCNC: 82 MG/DL (ref 70–99)
HDLC SERPL-MCNC: 44 MG/DL
LDLC SERPL CALC-MCNC: 73 MG/DL
NONHDLC SERPL-MCNC: 88 MG/DL
POTASSIUM SERPL-SCNC: 4.2 MMOL/L (ref 3.4–5.3)
PROT SERPL-MCNC: 6.8 G/DL (ref 6.8–8.8)
SODIUM SERPL-SCNC: 135 MMOL/L (ref 133–144)
TRIGL SERPL-MCNC: 76 MG/DL

## 2019-09-25 PROCEDURE — 36415 COLL VENOUS BLD VENIPUNCTURE: CPT | Performed by: INTERNAL MEDICINE

## 2019-09-25 PROCEDURE — 80053 COMPREHEN METABOLIC PANEL: CPT | Performed by: INTERNAL MEDICINE

## 2019-09-25 PROCEDURE — 80061 LIPID PANEL: CPT | Performed by: INTERNAL MEDICINE

## 2019-09-25 RX ORDER — METOPROLOL SUCCINATE 25 MG/1
25 TABLET, EXTENDED RELEASE ORAL DAILY
Qty: 90 TABLET | Refills: 2 | OUTPATIENT
Start: 2019-09-25

## 2019-09-27 NOTE — PROGRESS NOTES
"Outpatient Physical Therapy Discharge Note     Patient: Shirley Hendricks  : 1960    Beginning/End Dates of Reporting Period:  Patient initially evaluated and treated for BPPV 19. She then returned with recurrence of BPPV B 19. She was seen for a total of 3 visits in this time for CRM.     Referring Provider: Lizeth Peterson MD    Therapy Diagnosis: s/s BPPV     Client Self Report:  Patient reports she has been feeling much better since her visit last week. She still gets some dizziness with bending and looking up at times so is careful. She was able to mow the lawn yesterday without a problem. She has not had any vertigo with bed mobility. 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 She only feels off balance at times walking over uneven sidewalks.    Objective Measurements:  DGI-     mCTSIB- 30\" in all conditions but on foam EC, pt reports unable to complete due to poor footwear.     IR exam- negative for s/s for BPPV in all canals B     Outcome Measures (most recent score):  Dizziness Handicap Inventory (score out of 100) A decrease in score by 17.18 or greater indicates a clinically significant change in symptoms.: 8     Goals:  Goal Identifier 1   Goal Description Patient will score 0/100 on DHI indicating resolution of dizziness to be able to care for herself and her spouse safely without restriction.   Target Date 19   Date Met  (nearly met, score 8/100)   Progress:     Goal Identifier 2   Goal Description Patient will have negative s/s of BPPV for safety with bed mobility.   Target Date 19   Date Met  19   Progress:         Progress Toward Goals:   Progress this reporting period: Patient with resolution of BPPV s/s. She no longer requires skilled intervention.    Plan:  Discharge from therapy.    Discharge:    Reason for Discharge: Patient has met goal for resolution of BPPV.      "

## 2019-09-28 PROBLEM — E87.1 HYPONATREMIA: Status: RESOLVED | Noted: 2019-02-01 | Resolved: 2019-09-28

## 2019-10-02 ENCOUNTER — MYC MEDICAL ADVICE (OUTPATIENT)
Dept: INTERNAL MEDICINE | Facility: CLINIC | Age: 59
End: 2019-10-02

## 2019-10-02 DIAGNOSIS — E78.5 HYPERLIPIDEMIA LDL GOAL <70: Primary | ICD-10-CM

## 2019-10-03 ENCOUNTER — HEALTH MAINTENANCE LETTER (OUTPATIENT)
Age: 59
End: 2019-10-03

## 2019-10-07 ENCOUNTER — MYC MEDICAL ADVICE (OUTPATIENT)
Dept: INTERNAL MEDICINE | Facility: CLINIC | Age: 59
End: 2019-10-07

## 2019-12-06 ENCOUNTER — OFFICE VISIT (OUTPATIENT)
Dept: INTERNAL MEDICINE | Facility: CLINIC | Age: 59
End: 2019-12-06
Payer: COMMERCIAL

## 2019-12-06 VITALS
WEIGHT: 132 LBS | BODY MASS INDEX: 24.14 KG/M2 | OXYGEN SATURATION: 98 % | DIASTOLIC BLOOD PRESSURE: 64 MMHG | TEMPERATURE: 98.6 F | HEART RATE: 53 BPM | SYSTOLIC BLOOD PRESSURE: 132 MMHG

## 2019-12-06 DIAGNOSIS — R00.2 PALPITATIONS: ICD-10-CM

## 2019-12-06 DIAGNOSIS — R51.9 CHRONIC NONINTRACTABLE HEADACHE, UNSPECIFIED HEADACHE TYPE: ICD-10-CM

## 2019-12-06 DIAGNOSIS — G89.29 CHRONIC NONINTRACTABLE HEADACHE, UNSPECIFIED HEADACHE TYPE: ICD-10-CM

## 2019-12-06 LAB — ERYTHROCYTE [SEDIMENTATION RATE] IN BLOOD BY WESTERGREN METHOD: 9 MM/H (ref 0–30)

## 2019-12-06 PROCEDURE — 36415 COLL VENOUS BLD VENIPUNCTURE: CPT | Performed by: INTERNAL MEDICINE

## 2019-12-06 PROCEDURE — 85652 RBC SED RATE AUTOMATED: CPT | Performed by: INTERNAL MEDICINE

## 2019-12-06 PROCEDURE — 99214 OFFICE O/P EST MOD 30 MIN: CPT | Performed by: INTERNAL MEDICINE

## 2019-12-06 NOTE — PATIENT INSTRUCTIONS
[employer]betty 14 days to assess heart rhythms and if contributing to periods of lightheadedness. Call heart clinic at [facility] [phone] to get appt to get this placed and then mail back to [employer] after done. Bet sure they give diary for you to document if having symptoms and when occurring  Referral to  neurology. Call for appt  Possible sleep clinic referral if not improving with above  Reduce caffeine intake  Labs as ordered

## 2019-12-06 NOTE — PROGRESS NOTES
"Subjective     Shirley Cammy Hendricks is a 59 year old female who presents to clinic today for the following health issues:    HPI   Dizziness, feeling light headed  Duration of complaint: present over the past 3 months along with ongoing headaches, occasional sharp pains in head      Pt's past medical history, family history, habits, medications and allergies were reviewed with the patient today.  See snap shot for  HCM status. Most recent lab results reviewed with pt. Problem list and histories reviewed & adjusted, as indicated.  Additional history as below:    Getting periods of lightheadedness  more recently. No vertigo.  Getting episodes of flashes in bilateral vision peripheral fields. Resolve with  Tylenol  Eye exam, 11/22/19 was \"normal\" per pt   Sharp pains in head at times  Occ periods of eyes moving quickly with head still  Mild headache in frontal areas currently  MRI brain and MRA brain and MRA carotids  Done in Sept 2019. Reports reviewed and unremarkable for headache cause   Smoking approx 1ppd. Mild nasal congestion. No sinus pain    2-3 cups/day of coffee through the day  Occ palpitations/flutters  Tylenol every day. Pounding headache every AM when waking up  No known snoring. Some fatigue in the day. Rare naps.  blind so cannot monitor pt's sleep patterns     Additional ROS:   Constitutional, HEENT, Cardiovascular, Pulmonary, GI and , Neuro, MSK and Psych review of systems/symptoms are otherwise negative or unchanged from previous, except as noted above.      OBJECTIVE:  /64   Pulse 53   Temp 98.6  F (37  C) (Oral)   Wt 132 lb (59.9 kg)   SpO2 98%   BMI 24.14 kg/m     Estimated body mass index is 24.14 kg/m  as calculated from the following:    Height as of 9/18/19: 5' 2\" (1.575 m).    Weight as of this encounter: 132 lb (59.9 kg).  Eye: PERRL, EOMI  HENT: ear canals and TM's normal and nose and mouth without ulcers or lesions. Sinuses NT. No TA or TMJ area tenderness to " palpation  Neck: no adenopathy. Thyroid normal to palpation. No bruits  Pulm: Lungs clear to auscultation   CV: Regular rates and rhythm  GI: Soft, nontender, Normal active bowel sounds, No hepatosplenomegaly or masses palpable  Ext: Peripheral pulses intact. No edema.  Neuro: Normal strength and tone, sensory exam grossly normal    Assessment/Plan: (See plan discussion below for further details)  1. Chronic nonintractable headache, unspecified headache type   ESR to r/o TA. Referral to Neurology. ? Rebound headache. Possible trial of Topamax or Gabapentin. Will await neuro opinion. If not improving, will also assess for NOEL  - NEUROLOGY ADULT REFERRAL  - Erythrocyte sedimentation rate auto    2. Palpitations  R/o arrythmia. Reduce caffeine intake  - Zio Patch Holter Adult Pediatric Greater than 48 hrs; Future    Plan discussion:   Shyp 14 days to assess heart rhythms and if contributing to periods of lightheadedness. Call heart clinic at [facility] [phone] to get appt to get this placed and then mail back to [employer] after done. Bet sure they give diary for you to document if having symptoms and when occurring  Referral to  neurology. Call for appt  Possible sleep clinic referral if not improving with above  Reduce caffeine intake  Labs as ordered       Vasquez Benoit MD  Internal Medicine Department  [facility] ([facility]    (Chart documentation was completed, in part, with [employer] voice-recognition software. Even though reviewed, some grammatical, spelling, and word errors may remain.)

## 2019-12-06 NOTE — PROGRESS NOTES
SUBJECTIVE:                                                    Shirley Hendricks is a 56 year old female who presents to clinic today for the following health issues:      Hypothyroidism Follow-up      Since last visit, patient describes the following symptoms: Weight stable, no hair loss, no skin changes, no constipation, no loose stools      Amount of exercise or physical activity: None    Problems taking medications regularly: No    Medication side effects: none    Diet: regular (no restrictions)        Hyperlipidemia Follow-Up      Rate your low fat/cholesterol diet?: good    Taking statin?  Yes, no muscle aches from statin    Other lipid medications/supplements?:  Zetia, without side effects    Hypertension Follow-up      Outpatient blood pressures are not being checked.    Low Salt Diet: no added salt    Vascular Disease Follow-up:  Peripheral Vascular Disease (PVD)      Chest pain or pressure, left side neck or arm pain: No    Shortness of breath/increased sweats/nausea with exertion: No    Pain in calves walking 1-2 blocks: No    Worsened or new symptoms since last visit: No    Nitroglycerin use: no    Daily aspirin use: Yes    Cerebrovascular Follow-up      Patient history: TIA    Residual symptoms: None    Worsened or new symptoms since last visit: No    Daily aspirin use: Yes    Hypertension controlled: Yes    COPD Follow-Up    Symptoms are currently: slightly worsened    Current fatigue or dyspnea with ambulation: none    Shortness of breath: stable    Increased or change in Cough/Sputum: No    Fever(s): NoAny ER/UC or hospital admissions since your last visit? No     History   Smoking Status     Former Smoker     Packs/day: 0.25     Years: 40.00     Types: Cigarettes     Start date: 1958     Quit date: 5/30/2016   Smokeless Tobacco     Never Used     No results found for: FEV1, UIB4OSS    Other significant issues as outlined and addressed in the plan section of this note.    Recent labs are as  noted and addressed in the plan section of this note.      Problem list and histories reviewed & adjusted, as indicated.  Additional history: as documented    Labs reviewed in EPIC    Reviewed and updated as needed this visit by clinical staffTobacco  Allergies  Meds  Med Hx  Surg Hx  Fam Hx  Soc Hx      Reviewed and updated as needed this visit by Provider         ROS:  14 point ROS negative except as above      OBJECTIVE:     /80  Pulse 55  Temp 98.3  F (36.8  C) (Oral)  Resp 16  Wt 130 lb 11.2 oz (59.3 kg)  SpO2 100%  BMI 24.7 kg/m2  Body mass index is 24.7 kg/(m^2).  GENERAL: healthy, alert and no distress  EYES: Eyes grossly normal to inspection, PERRL and conjunctivae and sclerae normal  NECK: no adenopathy, no asymmetry, masses, or scars and thyroid normal to palpation  RESP: lungs clear to auscultation - no rales, rhonchi or wheezes  CV: regular rate and rhythm, normal S1 S2, no S3 or S4, no murmur, click or rub, no peripheral edema and peripheral pulses strong  ABDOMEN: soft, nontender, no hepatosplenomegaly, no masses and bowel sounds normal  MS: no gross musculoskeletal defects noted, no edema  SKIN: no suspicious lesions or rashes    Diagnostic Test Results:  Results for orders placed or performed in visit on 07/18/17   Vitamin D Deficiency   Result Value Ref Range    Vitamin D Deficiency screening 48 20 - 75 ug/L       ASSESSMENT/PLAN:     Hyperlipidemia; controlled   Plan:  No changes in the patient's current treatment plan    Hypertension; controlled   Associated with the following complications:    Heart Disease and Vascular Disease   Plan:  No changes in the patient's current treatment plan    Coronary Artery and Peripheral Vascular Disease; controlled   Associated with the following complications:    Bypass graft of LLE, stents in both lower extremity veins, status post AAA graft   Plan:  No changes in the patient's current treatment plan    COPD: Moderate = FeV1 < 79% -50%,  Anxiety slightly worsened  Associated with the following complications:  Prescriptions for Breo and albuterol renewed today and need for smoking cessation again strongly reiterated    Plan:  Medications: See orders      COPD education: [email or web address]    Hypothyroidism; controlled/euthyroid   Plan:  No changes in the patient's current treatment plan      DIAGNOSIS/PLAN:     ICD-10-CM    1. Tobacco use disorder F17.200 fluticasone-vilanterol (BREO ELLIPTA) 200-25 MCG/INH oral inhaler     Prof fee: Shared Decisionmaking for Lung Cancer Screening     CT Chest Lung Cancer Scrn Low Dose wo   2. PAD (peripheral artery disease) (H) I73.9 atorvastatin (LIPITOR) 80 MG tablet   3. Chronic obstructive pulmonary disease, unspecified COPD type (H) J44.9 albuterol (PROAIR HFA/PROVENTIL HFA/VENTOLIN HFA) 108 (90 BASE) MCG/ACT Inhaler     fluticasone-vilanterol (BREO ELLIPTA) 200-25 MCG/INH oral inhaler     Prof fee: Shared Decisionmaking for Lung Cancer Screening     CT Chest Lung Cancer Scrn Low Dose wo   4. Neuropathy (H) G62.9 cyabnocobalamin (VITAMIN B-12) 2500 MCG sublingual tablet   5. Hyperlipidemia LDL goal <70 E78.5 atorvastatin (LIPITOR) 80 MG tablet   6. Scurvy E54 Multiple Vitamins-Minerals (AIRBORNE) TBEF     ferrous sulfate Dried 140 (45 FE) MG TBCR     DISCONTINUED: ferrous fumarate 65 mg, [place]. FE,-Vitamin C 125 mg (VITRON C)  MG TABS tablet   7. Low iron E61.1 Multiple Vitamins-Minerals (AIRBORNE) TBEF     ferrous sulfate Dried 140 (45 FE) MG TBCR     DISCONTINUED: ferrous fumarate 65 mg, [place]. FE,-Vitamin C 125 mg (VITRON C)  MG TABS tablet   8. Vitamin D deficiency E55.9 cholecalciferol 2000 UNITS CAPS   9. Other tobacco product nicotine dependence with nicotine-induced disorder F17.299 Prof fee: Shared Decisionmaking for Lung Cancer Screening     CT Chest Lung Cancer Scrn Low Dose wo   10. Personal history of MI (myocardial infarction) I25.2 nitroGLYcerin (NITROSTAT) 0.4 MG sublingual tablet   11. Chronic  allergic rhinitis due to animal hair and dander J30.81 loratadine (CLARITIN) 10 MG tablet   12. Osteoporosis, unspecified osteoporosis type, unspecified pathological fracture presence M81.0 DX Hip/Pelvis/Spine     denosumab (PROLIA) 60 MG/ML SOLN injection     Calcium     Magnesium     Phosphorus   13. Current smoker F17.200 Prof fee: Shared Decisionmaking for Lung Cancer Screening     CT Chest Lung Cancer Scrn Low Dose wo   14. Dermatitis L30.9 DERMATOLOGY REFERRAL       SIGNIFICANT ISSUES RE The primary encounter diagnosis was Tobacco use disorder. Diagnoses of PAD (peripheral artery disease) (H), Chronic obstructive pulmonary disease, unspecified COPD type (H), Neuropathy (H), Hyperlipidemia LDL goal <70, Scurvy, Low iron, Vitamin D deficiency, Other tobacco product nicotine dependence with nicotine-induced disorder, Personal history of MI (myocardial infarction), Chronic allergic rhinitis due to animal hair and dander, Osteoporosis, unspecified osteoporosis type, unspecified pathological fracture presence, Current smoker, and Dermatitis were also pertinent to this visit. AS NOTED AND ADDRESSED ABOVE   MEDS AND AND LABS AS ORDERED TO ADDRESS PREVIOUS AND CURRENT ABNORMAL INDICES    Please see orders for treatment plan today. Prescription for Prolia written out for prior authorization. Nel will be called when her prescription is ready. Again the issue of smoking cessation is addressed as she is now requiring treatment again for bronchospasm. She reports that she has a lot of stress in her life and she will continue to work on not smoking. She is also due for CT scan of the lung for lung cancer screening which was ordered today she is encouraged on compliance with getting this done.    Her labs were also reviewed and she still has significant vitamin C deficiency which could account for is a bruising, fatigue, and joint pains. Compliance with medication management recommended. She is also given a referral to  dermatology to evaluate a persistent skin lesion.    DEXA scan was also ordered today for follow-up of osteoporosis.    SEE PATIENT INSTRUCTION SECTION FOR FOLLOW UP PLAN    Shirley IS TO CONTINUE OTHER TREATMENT REGIMEN/PLANS EXCEPT AS INDICATED    COMPLIANCE WITH MEDICATIONS DIET AND EXERCISE PLANS ENCOURAGED    DISCONTINUED MEDS:  Medications Discontinued During This Encounter   Medication Reason     nicotine polacrilex (COMMIT) 4 MG lozenge      buPROPion (WELLBUTRIN XL) 150 MG 24 hr tablet      nicotine (NICODERM CQ) 7 MG/24HR 24 hr patch      ferrous fumarate 65 mg, [place]. FE,-Vitamin C 125 mg (VITRON C)  MG TABS tablet Reorder     cholecalciferol 5000 UNITS CAPS Reorder     Cyanocobalamin (VITAMIN  B-12) 2500 MCG tablet Reorder     atorvastatin (LIPITOR) 80 MG tablet Reorder     nitroglycerin (NITROSTAT) 0.4 MG sublingual tablet Reorder     vitamin C-electrolytes (EMERGEN-C) 1000mg vitamin C super orange drink mix      albuterol (PROAIR HFA, PROVENTIL HFA, VENTOLIN HFA) 108 (90 BASE) MCG/ACT inhaler Reorder     ferrous fumarate 65 mg, [place]. FE,-Vitamin C 125 mg (VITRON C)  MG TABS tablet Reorder       CURRENT MED LIST WITH CHANGES AS NOTED BELOW:  Current Outpatient Prescriptions   Medication Sig Dispense Refill     cholecalciferol 2000 UNITS CAPS Take 2 capsules by mouth daily FOR VITAMIN D DEFICIENCY (LOW VITAMIN D) 100 capsule PRN     cyabnocobalamin (VITAMIN B-12) 2500 MCG sublingual tablet Take 2,500 mcg by mouth daily INDICATION: FOR VITAMIN B12 SUPPLEMENTATION - TO IMPROVE MEMORY, BALANCE, SLEEP AND MOOD, DIRECTIONS: PLEASE PLACE UNDER THE TONGUE TO DISSOLVE 100 tablet 3     atorvastatin (LIPITOR) 80 MG tablet TAKE ONE TABLET (80MG) BY MOUTH EVERY EVENING INDICATION:TO LOWER CHOLESTEROL AND TO HELP REDUCE RISK OF REOCURRENCE OF HEAR ATTACK STROKE,A 90 tablet 2     nitroGLYcerin (NITROSTAT) 0.4 MG sublingual tablet Place 1 tablet (0.4 mg) under the tongue See Admin Instructions for chest pain  15 tablet 7     Multiple Vitamins-Minerals (AIRBORNE) TBEF Take 1 tablet by mouth every morning INDICATION: VITAMIN C SUPPLEMENT 30 tablet 11     albuterol (PROAIR HFA/PROVENTIL HFA/VENTOLIN HFA) 108 (90 BASE) MCG/ACT Inhaler Inhale 2 puffs into the lungs every 6 hours as needed for shortness of breath / dyspnea or wheezing 1 Inhaler prn     loratadine (CLARITIN) 10 MG tablet Take 1 tablet (10 mg) by mouth daily INDICATION: TO CONTROL ALLERGY SYMPTOMS 90 tablet 3     fluticasone-vilanterol (BREO ELLIPTA) 200-25 MCG/INH oral inhaler Inhale 1 puff into the lungs daily INDICATION: COPD 1 Inhaler 3     ferrous sulfate Dried 140 (45 FE) MG TBCR Take 1 tablet by mouth every morning (before breakfast) IRON SUPPLEMENT - PLEASE TAKE WITH 4 OZ OF OJ WITH PULP, SUBSTITUTION OF IRON SUPPLEMENT PERMITTED 180 tablet PRN     denosumab (PROLIA) 60 MG/ML SOLN injection Inject 1 mL (60 mg) Subcutaneous once for 1 dose 1 mL 0     clopidogrel (PLAVIX) 75 MG tablet TAKE ONE TABLET BY MOUTH ONE TIME DAILY  90 tablet 0     omeprazole (PRILOSEC) 20 MG CR capsule TAKE 1 CAPSULE (20 MG) BY MOUTH DAILY INDICATION: TO CONTROL REFLUX SYMPTOMS 90 capsule 2     CALCIUM 600-D 600-400 MG-UNIT TABS TAKE 1 TABLET BY MOUTH 2 TIMES DAILY FOR BONE HEALTH AND FOR VITAMIN D DEFICIENCY (LOW VITAMIN D) 100 tablet PRN     ezetimibe (ZETIA) 10 MG tablet TAKE 1 TABLET (10 MG) BY MOUTH DAILY INDICATION: TO LOWER CHOLESTEROL 90 tablet PRN     B Complex-Biotin-FA (EQL B COMPLEX 50) TABS TAKE 1 TABLET BY MOUTH EVERY OTHER DAY 15 tablet PRN     traMADol (ULTRAM) 50 MG tablet Take 1 tablet (50 mg) by mouth every 6 hours as needed for moderate pain 120 tablet 3     lisinopril (PRINIVIL/ZESTRIL) 40 MG tablet Take 1 tablet (40 mg) by mouth daily INDICATION:TO LOWER BLOOD PRESSURE AND TO PRESERVE KIDNEY FUNCTION 90 tablet 2     metoprolol (TOPROL-XL) 25 MG 24 hr tablet Take 0.5 tablets (12.5 mg) by mouth 2 times daily INDICATION:TO LOWER BLOOD PRESSURE AND TO HELP  PREVENT HEART DISEASE 90 tablet 3     Acetaminophen (TYLENOL PO) Take 500-1,000 mg by mouth every 6 hours as needed for mild pain or fever       Omega-3 Fatty Acids (OMEGA-3 FISH OIL PO) Take 2 capsules by mouth 2 times daily       ASPIRIN EC PO Take 81 mg by mouth daily       ISOSORBIDE DINITRATE PO Take 30 mg by mouth every morning Verified with [facility] and patient med bottle as 30 mg Dinitrate daily.       B Complex Vitamins (B COMPLEX 50) TABS Take 1 tablet by mouth every other day INDICATION: VITAMIN SUPPLEMENT 100 tablet PRN     calcium-vitamin D (CALCIUM 600 + D) 600-400 MG-UNIT per tablet Take 1 tablet by mouth 2 times daily FOR BONE HEALTH AND FOR VITAMIN D DEFICIENCY (LOW VITAMIN D) 100 tablet PRN     denosumab (PROLIA) 60 MG/ML SOLN Inject 1 mL (60 mg) Subcutaneous every 6 months INDICATION: TO TREAT OSTEOPOROSIS 1 Syringe PRN     ORDER FOR DME Equipment being ordered: BP MACHINE WITH PULSE MONITOR 1 Device PRN     [DISCONTINUED] atorvastatin (LIPITOR) 80 MG tablet TAKE ONE TABLET (80MG) BY MOUTH EVERY EVENING INDICATION:TO LOWER CHOLESTEROL AND TO HELP REDUCE RISK OF REOCURRENCE OF HEAR ATTACK STROKE,A 90 tablet 2     [DISCONTINUED] nitroglycerin (NITROSTAT) 0.4 MG sublingual tablet Place 1 tablet (0.4 mg) under the tongue See Admin Instructions for chest pain (Patient not taking: Reported on 7/20/2017) 15 tablet 7     [DISCONTINUED] albuterol (PROAIR HFA, PROVENTIL HFA, VENTOLIN HFA) 108 (90 BASE) MCG/ACT inhaler Inhale 2 puffs into the lungs every 6 hours as needed for shortness of breath / dyspnea or wheezing 1 Inhaler prn         Office visit time > 45 mins, greater than 50% of which was spent obtaining history, reviewing medications, counseling re compliance with treatment plan , discussion of treatment, follow up plans, and coordination of care.     Patient Instructions   ** FOLLOW UP PLAN**:    PLEASE SCHEDULE FASTING LABS WITH OFFICE VISIT 6 MONTHS FROM TODAY TO FOLLOW UP ON  Blood pressure, thyroid,  vascular issues, and to review your test results     BE SURE TO SCHEDULE YOUR LAB DRAW APPOINTMENT FOR AT LEAST 5 DAYS BEFORE YOUR NEXT VISIT      YOU HAVE BEEN REFERRED FOR bone density scan, to dermatology and for CT scan of the lungs to screen for lung cancer   PLEASE  MAKE SURE TO SCHEDULE BONE DENSITY APPOINTMENT(S) AT THE   OR BY CALLING THE NUMBER BELOW AND  the rest of your APPOINTMENT(S) BY TELEPHONE      YOU MAY CONTACT THE CLINIC IF ANY QUESTIONS OR CONCERNS -[phone] OR VIA [employer]         Lung Cancer Screening   Frequently Asked Questions  If you are at high-risk for lung cancer, getting screened with low-dose computed tomography (LDCT) every year can help save your life. This handout offers answers to some of the most common questions about lung cancer screening. If you have other questions, please call [phone] ([phone]).     What is it?  Lung cancer screening uses special X-ray technology to create an image of your lung tissue. The exam is quick and easy and takes less than 10 seconds. We don t give you any medicine or use any needles. You can eat before and after the exam. You don t need to change your clothes as long as the clothing on your chest doesn t contain metal. But, you do need to be able to hold your breath for at least 6 seconds during the exam.    What is the goal of lung cancer screening?  The goal of lung cancer screening is to save lives. Many times, lung cancer is not found until a person starts having physical symptoms. Lung cancer screening can help detect lung cancer in the earliest stages when it may be easier to treat.    Who should be screened for lung cancer?  We suggest lung cancer screening for anyone who is at high-risk for lung cancer. You are in the high-risk group if you:      are between the ages of 55 and 79, and    have smoked at least 1 pack of cigarettes a day for 30 or more years, and    still smoke or have quit within the past 15  years.    However, if you have a new cough or shortness of breath, you should talk to your doctor before being screened.    Some national lung health advocacy groups also recommend screening for people ages 50 to 79 who have smoked an average of 1 pack of cigarettes a day for 20 years. They must also have at least 1 other risk factor for lung cancer, not including exposure to secondhand smoke. Other risk factors are having had cancer in the past, emphysema, pulmonary fibrosis, COPD, a family history of lung cancer, or exposure to certain materials such as arsenic, asbestos, beryllium, cadmium, chromium, diesel fumes, nickel, radon or silica. Your care team can help you know if you have one of these risk factors.     Why does it matter if I have symptoms?  Certain symptoms can be a sign that you have a condition in your lungs that should be checked and treated by your doctor. These symptoms include fever, chest pain, a new or changing cough, shortness of breath that you have never felt before, coughing up blood or unexplained weight loss. Having any of these symptoms can greatly affect the results of lung cancer screening.       Should all smokers get an LDCT lung cancer screening exam?  It depends. Lung cancer screening is for a very specific group of men and women who have a history of heavy smoking over a long period of time (see  Who should be screened for lung cancer  above).  I am in the high-risk group, but have been diagnosed with cancer in the past. Is LDCT lung cancer screening right for me?  In some cases, you should not have LDCT lung screening, such as when your doctor is already following your cancer with CT scan studies. Your doctor will help you decide if LDCT lung screening is right for you.  Do I need to have a screening exam every year?  Yes. If you are in the high-risk group described earlier, you should get an LDCT lung cancer screening exam every year until you are 79, or are no longer willing  or able to undergo screening and possible procedures to diagnose and treat lung cancer.  How effective is LDCT at preventing death from lung cancer?  Studies have shown that LDCT lung cancer screening can lower the risk of death from lung cancer by 20 percent in people who are at high-risk.  What are the risks?  There are some risks and limitations of LDCT lung cancer screening. We want to make sure you understand the risks and benefits, so please let us know if you have any questions. Your doctor may want to talk with you more about these risks.    Radiation exposure: As with any exam that uses radiation, there is a very small increased risk of cancer. The amount of radiation in LDCT is small--about the same amount a person would get from a mammogram. Your doctor orders the exam when he or she feels the potential benefits outweigh the risks.    False negatives: No test is perfect, including LDCT. It is possible that you may have a medical condition, including lung cancer, that is not found during your exam. This is called a false negative result.    False positives and more testing: LDCT very often finds something in the lung that could be cancer, but in fact is not. This is called a false positive result. False positive tests often cause anxiety. To make sure these findings are not cancer, you may need to have more tests. These tests will be done only if you give us permission. Sometimes patients need a treatment that can have side effects, such as a biopsy. For more information on false positives, see  What can I expect from the results?     Findings not related to lung cancer: Your LDCT exam also takes pictures of areas of your body next to your lungs. In a very small number of cases, the CT scan will show an abnormal finding in one of these areas, such as your kidneys, adrenal glands, liver or thyroid. This finding may not be serious, but you may need more tests. Your doctor can help you decide what other tests  you may need, if any.  What can I expect from the results?  About 1 out of 4 LDCT exams will find something that may need more tests. Most of the time, these findings are lung nodules. Lung nodules are very small collections of tissue in the lung. These nodules are very common, and the vast majority--more than 97 percent--are not cancer (benign). Most are normal lymph nodes or small areas of scarring from past infections.  But, if a small lung nodule is found to be cancer, the cancer can be cured more than 90 percent of the time. To know if the nodule is cancer, we may need to get more images before your next yearly screening exam. If the nodule has suspicious features (for example, it is large, has an odd shape or grows over time), we will refer you to a specialist for further testing.  Will my doctor also get the results?  Yes. Your doctor will get a copy of your results.  Is it okay to keep smoking now that there s a cancer screening exam?  No. Tobacco is one of the strongest cancer-causing agents. It causes not only lung cancer, but other cancers and cardiovascular (heart) diseases as well. The damage caused by smoking builds over time. This means that the longer you smoke, the higher your risk of disease. While it is never too late to quit, the sooner you quit, the better.  Where can I find help to quit smoking?  The best way to prevent lung cancer is to stop smoking. If you have already quit smoking, congratulations and keep it up! For help on quitting smoking, please call [employer] at 9-664-903-HNSX ([phone]) or the American Cancer Society at 1-685.511.8190 to find local resources near you.  One-on-one health coaching:  If you d prefer to work individually with a health care provider on tobacco cessation, we offer:      Medication Therapy Management:  Our specially trained pharmacists work closely with you and your doctor to help you quit smoking.  Call [phone] or [phone] (toll free).     Can Do: Health  coaching offered by [facility].  [email or web address]    Resources to Help You Quit Smoking  If you have quit smoking or are thinking about quitting, congratulations! It can be hard to quit smoking, but the benefits are well worth it. To help you quit and stay smoke-free, there are many resources that can help.   Your health plan  If you have health insurance, call them for more details about their phone coaching programs.    [facility]: 1-888-662-BLUE     [facility]: 1-888-662-QUIT     [facility]: 1-888-662-BLUE     [facility]: [phone]     [facility]: [phone]     [facility] members: [phone]     [facility]: [phone]     [facility] Plan: [phone]     [facility]: [phone]     [facility]    One-on-one coaching with a specially trained Medication Therapy Management (MTM) pharmacist: [phone] or [phone] (toll free)    Group classes with a health  to help you create a personal quit plan, including prescription quit aides if necessary: [employer] group classes: [phone]  American Cancer Society: [phone]  The American Cancer Society can help you find local resources to quit smoking.     QUITPLAN: 7-125-909-PLAN (2915)  QUITPLAN Services offers a telephone helpline, gum, patches and lozenges. These services are free for the uninsured and those without coverage. The QUITPLAN online program is free to everyone at www.quitplan.com    American Lung Association: 9-691-VRCA-[place] (590-2383)  The American Lung Association offers a lung helpline as well as an online program, self-help book and group clinic support for quitting smoking at www.lung.org/stop-smoking    [employer] [place]: 2-728-084-QUIT (1222)  The [employer] [place] offers a telephone hotline, online text chat and a website with tools,  information and support for smokers who want to quit at www.smokefree.gov    PROLIA  Risk Evaluation and Mitigation Strategy Best Practice Advisory    In May 2015, the FDA required an update to the PROLIA  (denosumab) REMS (Risk Evaluation and Mitigation Strategy) to inform both providers and patients about potential serious risks related to PROLIA .    These potential serious risks include:    Hypocalcemia    Osteonecrosis of the jaw    Atypical femoral fractures    Serious Infections    Dermatologic reactions    To ensure compliance with these requirements [place] has developed a workflow for those patients who will receive PROLIA  at clinic.  This workflow includes insurance verification, patient scheduling, patient education, and documentation. Registered Nurses in the clinic should have completed eLearning related to the [facility] and the clinic workflow.  Refer to them to initiate this process.  This workflow does not need to be executed if the patient is to receive PROLIA  injection at [facility].       The  has put together a Patient Education Toolkit.  Copies of these handouts may be available your clinic. Patients must receive the Patient Brochure and Medication Guide when receiving injection at clinic.  These will be attached to the injection ordered from [employer] to the clinic. Links to handouts:  Patient Counseling Chart for Healthcare Providers  Patient Brochure  Prescribing Information  Medication Guide    If you are having trouble accessing the above links, these documents can be found at: [email or web address]    The role of healthcare provider includes reviewing patient education materials with the patient, advising patients to seek medical attention if they have signs or symptoms of one of the serious risks, and provide patients a copy of the Patient Brochure and Medication Guide when receiving  their injection.      Due to the risk of hypocalcemia the Prescribing Information for PROLIA  recommends that calcium and mineral levels (magnesium and phosphorus) be checked within 14 days of injection for those predisposed to hypocalcemia.          Eun Narvaez MD  [facility]          Lung Cancer Screening Shared Decision Making Visit     Shirley Hendricks is eligible for lung cancer screening on the basis of the information provided in my signed lung cancer screening order.     I have discussed with patient the risks and benefits of screening for lung cancer with low-dose CT.     The risks include:   radiation exposure    false positives     over-diagnosis    The benefit of early detection of lung cancer is contingent upon adherence to annual screening or more frequent follow up if indicated.     Furthermore, reaping the benefits of screening requires Shirley Hendricks to be willing and physically able to undergo diagnostic procedures, if indicated. Although no specific guide is available for determining severity of comorbidities, it is reasonable to withhold screening in patients who have greater mortality risk from other diseases.     We did discuss that the only way to prevent lung cancer is to not smoke. Smoking cessation assistance was offered.    I did not offer risk estimation using a calculator such as this one:    ShouldIScreen

## 2019-12-09 DIAGNOSIS — M81.0 OSTEOPOROSIS, UNSPECIFIED OSTEOPOROSIS TYPE, UNSPECIFIED PATHOLOGICAL FRACTURE PRESENCE: ICD-10-CM

## 2019-12-10 ENCOUNTER — HOSPITAL ENCOUNTER (OUTPATIENT)
Dept: CARDIOLOGY | Facility: CLINIC | Age: 59
Discharge: HOME | End: 2019-12-10
Attending: INTERNAL MEDICINE | Admitting: INTERNAL MEDICINE
Payer: COMMERCIAL

## 2019-12-10 DIAGNOSIS — R00.2 PALPITATIONS: ICD-10-CM

## 2019-12-10 PROCEDURE — 0298T ZIO PATCH HOLTER ADULT PEDIATRIC GREATER THAN 48 HRS: CPT | Performed by: INTERNAL MEDICINE

## 2019-12-10 PROCEDURE — 0296T ZIO PATCH HOLTER ADULT PEDIATRIC GREATER THAN 48 HRS: CPT

## 2019-12-16 ENCOUNTER — HEALTH MAINTENANCE LETTER (OUTPATIENT)
Age: 59
End: 2019-12-16

## 2020-01-15 ENCOUNTER — HOSPITAL ENCOUNTER (OUTPATIENT)
Facility: CLINIC | Age: 60
End: 2020-01-15
Attending: INTERNAL MEDICINE | Admitting: INTERNAL MEDICINE
Payer: COMMERCIAL

## 2020-02-12 NOTE — PATIENT INSTRUCTIONS
AFTER VISIT SUMMARY (AVS):    At today's visit we thoroughly discussed various diagnostic possibilities for your symptoms, necessary evaluation, and the plan.  I do not see neurological explanations for your ongoing dizziness.  It might be related to significantly elevated blood pressure and possibly heart condition that should be further evaluated by cardiologist.      Please schedule a separate visit to discuss your headaches.  Please reduce the amount of Tylenol you currently take to less than 15 days/month.    Please do not hesitate to call me with any questions or concerns.    Thanks.

## 2020-02-12 NOTE — PROGRESS NOTES
"INITIAL NEUROLOGY CONSULTATION    DATE OF VISIT: 2/13/2020  CLINIC LOCATION: [facility]  MRN: [id number]  PATIENT NAME: Shirley Hendricks  YOB: 1960    PRIMARY CARE PROVIDER: Vasquez Benoit MD     REASON FOR VISIT:   Chief Complaint   Patient presents with     Dizziness     sep 17 felt like an \"acid trip\"      HISTORY OF PRESENT ILLNESS:                                                    Ms. Shirley Hendricks is 59 year old right handed female patient with past medical history of hyperlipidemia, hypertension, peripheral polyneuropathy, TIA, status post carotid endarterectomy, depression, and anxiety, who was seen in consultation today requested by Vasquez Benoit MD, for headache management.  However, the patient states that her most troubling symptom that she wants to discuss today is dizziness.  She is accompanied by her daughter, who participates in interview.    Per patient's report, she acutely developed dizziness/lightheadedness on 09/17/2019 while visiting her sister.  It felt different from previously experienced (in July and August 2019) 2 episodes of BPPV.  Dizziness subsided with hydration, but recurred the next day on her way to work, \"the road felt and looked wobbly\".  She was not able to clearly see road signs and stoplights of cars in front of her.  Also experienced nausea.  When she arrived to work, she realized that she is not able to drive at all.  Talk to her supervisor and called her son to drive her to the emergency department of [facility].    At that time her blood pressure was 190/91.  Other vital signs were unremarkable.  Brain MRI with and without contrast from 09/18/2019 demonstrated bifrontal encephalomalacia, felt to be related to old trauma and unchanged from previous MRI from 2016, without other acute intracranial findings.  Head MRA from the same day was limited by motion, but did not demonstrate any appreciable abnormality.  Neck MRA " demonstrated 60% narrowing of the proximal left internal carotid artery and 15% narrowing of the proximal right internal carotid artery, unchanged from 2016.    Was discharged home, but her dizziness continued.  She underwent 2-week heart monitoring that demonstrated 3 ventricular tachycardia runs (longest was 10 beats) and 47 SVT runs (with longest of 20 beats) without clinical symptoms.    At the present time, the patient continues to experience intermittent dizziness, described as lightheadedness.  It occurs from once in 3 days up to twice per day lasting from 2 to 3 minutes to 5 to 6 minutes.  Associated symptoms include blurry vision and occasional oscillopsia.  The episode could be triggered by the change of her position from sitting to standing, quick body turns or quick head turns while sitting, though not every time she does these movements.  Driving, doing housework, sitting at the computer, or other activity make symptoms worse.  Resting or sleeping make her feel better.  She tried Tylenol, increased hydration, and reduced caffeine intake without significant changes.    In addition, the patient reports daily headaches that were not discussed in details today.  She reports that she takes 6 to 8 tablets of extra strength Tylenol per day every day.  She also has neuropathy that affects her walking (previously evaluated).    Recent laboratory evaluation from September-December 2019 includes unremarkable CMP, normal LDL (723), and sed rate (9).    The patient was previously seen in [facility] stroke neurology clinic in 2016 (Dr. Azul) for symptomatic right ICA stenosis.  Underwent carotid cerebral angiogram that demonstrated 65% stenosis of the right internal carotid artery and 12% stenosis of the left internal carotid artery in addition to small segment of fenestration at the proximal right A2 of PRICILLA.  She had right carotid endarterectomy in May 2016.    No additional useful information is available in  Care Everywhere, which was reviewed.    Review of Systems - the patient endorses multiple chronic complaints on 14-point comprehensive review of systems of the Patient Health History Form, that were reviewed and will be scanned into her electronic medical record at the end of this encounter. Her primary care and other medical providers are aware and addressing all of them.  PAST MEDICAL/SURGICAL HISTORY:                                                    I personally reviewed patient's past medical and surgical history with the patient at today's visit.  Patient Active Problem List   Diagnosis     Reflux esophagitis     Health Care Home     Osteoporosis     Cervicalgia     Hyperlipidemia LDL goal <70     PAD (peripheral artery disease) (H)     Essential hypertension     Neuropathy     Coronary artery disease involving native coronary artery of native heart without angina pectoris     Primary osteoarthritis involving multiple joints     DDD (degenerative disc disease), lumbar     Chronic pain syndrome     Stroke (H)     Bilateral carotid artery stenosis     S/P carotid endarterectomy     Chronic allergic rhinitis due to animal hair and dander     Tobacco use disorder     Chronic obstructive pulmonary disease, unspecified COPD type (H)     Mild major depression (H)     Anxiety     Vitamin C deficiency     Past Medical History:   Diagnosis Date     Anxiety 12/7/2017     COPD (chronic obstructive pulmonary disease) (H)      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 Simons dermatology     Embolism and thrombosis of unspecified artery (H) 8/00    Protein C,S, Leiden FV, Lupus Inhibitor Negative     Gastroesophageal reflux disease      Hyperlipidaemia      Hypertension      Lupus (H)     skin     Mild major depression (H) 11/7/2017     Myocardial infarction (H)     x3     Osteoarthrosis, unspecified whether generalized or localized, unspecified site      PAD (peripheral artery disease)  (H)      Peripheral vascular disease, unspecified (H) 12/00    s/p angioplasty with stent right femoral a.; Right iliac and femoral a. clot     Post-menopausal      Reflux esophagitis 2/04    EGD: esophagitis and medium HH     Uncomplicated asthma      Vitamin C deficiency 8/12/2018     Past Surgical History:   Procedure Laterality Date     ANGIOGRAM       C FABRIC WRAPPING OF ABDOMINAL ANEURYSM       C NONSPECIFIC PROCEDURE  12/00    angioplasty with stent right fem. a.     C NONSPECIFIC PROCEDURE  1987    sinus surgery     C NONSPECIFIC PROCEDURE  9/2/2009    Emergent left groin exploration with oversewing of bleeding angiographic site. 2. Endarterectomy of common femoral-proximal superficial femoral artery with greater saphenous vein patch angioplasty.   a. [place] of accessory right greater saphenous vein.      C NONSPECIFIC PROCEDURE  8/27/2009    occluded left common iliac and external iliac arteries were successfully revascularized with stenting to 8 and 7 mm      CARDIAC CATHERIZATION  9/3/2009    multivessel CAD without target lesions, med mgmt indicated, preserved ef     ENDARTERECTOMY CAROTID Right 5/11/2016    Procedure: ENDARTERECTOMY CAROTID;  Surgeon: Chadwick Lozano MD;  Location:  OR     GYN SURGERY  left tube    left salpingectomy     LAPAROSCOPIC CHOLECYSTECTOMY N/A 9/27/2017    Procedure: LAPAROSCOPIC CHOLECYSTECTOMY;  LAPAROSCOPIC CHOLECYSTECTOMY;  Surgeon: Jacoby Tapia MD;  Location: [facility]     ORTHOPEDIC SURGERY      left knee surgery     VASCULAR SURGERY  aoto bi fem  left fem-AK pop     MEDICATIONS:                                                    I personally reviewed patient's medications and allergies with the patient at today's visit.  Acetaminophen (TYLENOL PO), Take 1,000-1,500 mg by mouth every 6 hours as needed for mild pain or fever   ASPIRIN EC PO, Take 81 mg by mouth daily  atorvastatin (LIPITOR) 80 MG tablet, TAKE ONE TABLET (80MG) BY MOUTH EVERY EVENING  INDICATION:TO LOWER CHOLESTEROL AND TO HELP REDUCE RISK OF REOCURRENCE OF HEAR ATTACK STROKE,A  CALCIUM 600-D 600-400 MG-UNIT TABS, Take 1 tablet by mouth 2 times daily  clopidogrel (PLAVIX) 75 MG tablet, Take 1 tablet (75 mg) by mouth daily  denosumab (PROLIA) 60 MG/ML SOLN injection, Inject 1 mL (60 mg) Subcutaneous every 6 months INDICATION: TO TREAT OSTEOPOROSIS  fluticasone-vilanterol (BREO ELLIPTA) 200-25 MCG/INH inhaler, Inhale 1 puff into the lungs daily INDICATION: COPD CONTROLLER  lisinopril (PRINIVIL/ZESTRIL) 20 MG tablet, Take 1 tablet (20 mg) by mouth 2 times daily  metoprolol succinate ER (TOPROL-XL) 25 MG 24 hr tablet, Take 1 tablet (25 mg) by mouth daily  nitroGLYcerin (NITROSTAT) 0.4 MG sublingual tablet, Place 1 tablet (0.4 mg) under the tongue See Admin Instructions for chest pain  omeprazole (PRILOSEC) 20 MG DR capsule, 1 capsule daily as needed.  ALLERGIES:                                                      Allergies   Allergen Reactions     Contrast Dye Anaphylaxis     RASH, FACIAL AND NECK SWELLING, SOB, WHEEZING     Pantoprazole      Protonix caused diffuse edema     Penicillins Itching     FAMILY/SOCIAL HISTORY:                                                    Family and social history was reviewed with the patient at today's visit.   Family history is positive for high-arched feet, brain tumor, migraine, and headache.  Current everyday smoker, counseled to quit.  Denies current alcohol and recreational drug use.  , lives with her family.  Works part-time as a [employer] .  REVIEW OF SYSTEMS:                                                    Patient has completed a [facility] Patient Health History, including a 14-system review, which was personally reviewed, and pertinent positives are listed in HPI. She denies any additional problems on the further questioning.  EXAM:                                                    VITAL SIGNS:   BP (!) 182/81 (BP  "Location: Right arm, Patient Position: Standing, Cuff Size: Adult Regular)   Pulse 53   Temp (P) 97.8  F (36.6  C) (Oral)   Ht 1.575 m (5' 2\")   Wt (P) 59.4 kg (131 lb)   SpO2 99%   BMI (P) 23.96 kg/m     Orthostatic vital signs:  Vitals:    02/13/20 1523 02/13/20 1531   BP: (!) 194/82 (!) 182/81   BP Location: Right arm Right arm   Patient Position: Supine Standing   Cuff Size: Adult Regular Adult Regular   Pulse: 59 53   Temp: (P) 97.8  F (36.6  C)    TempSrc: (P) Oral    SpO2: 99%    Weight: (P) 59.4 kg (131 lb)    Height: 1.575 m (5' 2\")      Mini-Cog Assessment:  Mini Cog Assessment  Clock Draw Score: 2 Normal  3 Item Recall: 2 objects recalled  Mini Cog Total Score: 4    General: pt is in NAD, cooperative.  Skin: normal turgor, moist mucous membranes, no lesions/rashes noticed.  HEENT: ATNC, EOMI, PERRL, white sclera, normal conjunctiva, no nystagmus or ptosis. No carotid bruits bilaterally.  Respiratory: lung sounds clear to auscultation bilaterally, no crackles, wheezes, rhonchi. Symmetric lung excursion, no accessory respiratory muscle use.  Cardiovascular: normal S1/S2, no murmurs/rubs/gallops.   Abdomen: Not distended.  : deferred.    Neurological:  Mental: alert, follows commands, Mini Cog Total Score: 4/5 with 2/3 on memory recall, no aphasia or dysarthria. Fund of knowledge is appropriate for age.  Cranial Nerves:  CN II: visual acuity - able to accurately count fingers with each eye. Visual fields intact, fundi: discs sharp, no papilledema and normal vessels bilaterally.  CN III, IV, VI: EOM intact, pupils equal and reactive  CN V: facial sensation nl  CN VII: face symmetric, no facial droop  CN VIII: hearing normal  CN IX: palate elevation symmetric, uvula at midline  CN XI SCM normal, shoulder shrug nl  CN XII: tongue midline  Motor: Strength: 5/5 in all major groups of upper and proximal lower extremities, it is 4-/5 in distal lower extremities. Normal tone. No abnormal movements. No " pronator drift b/l.  Has high arches and hammertoes of both feet.  Reflexes: Triceps, biceps, and brachioradialis reflexes are normal and symmetric, patellar reflexes are reduced bilaterally, and achilles are absent bilaterally. No clonus noted. Toes are down-going b/l.   Sensory: temperature, light touch, pinprick, and vibration reduced in both lower extremities below the knees.  Coordination: FNF and heel-shin tests intact b/l.  Gait: Unsteady with bilateral steppage.  DATA:   LABS/IMAGING/OTHER STUDIES: I reviewed pertinent medical records, including personal review of brain MRI/head and neck MRA images, prior neurology notes, and Care Everywhere, as detailed in the history of present illness.  ASSESSMENT AND PLAN:      ASSESSMENT: Shirley Hendricks is a 59 year old female patient with past medical history of hyperlipidemia, hypertension, neuropathy, TIA, status post carotid endarterectomy, depression, and anxiety, who presents with persistent dizziness/lightheadedness since September 2019.    We had a detailed discussion with the patient and her daughter regarding her presenting complaints.  Her neurological exam today is supportive of known history of peripheral polyneuropathy (likely CMT).  Orthostatic testing is negative, but blood pressure is high.  We reviewed recent brain MRI, head and neck MRA images together.    The clinical presentation is not consistent with peripheral or central vestibular dysfunction.  Most likely the symptoms are due to uncontrolled blood pressure and possibly unrecognized arrhythmia (her recent heart monitoring demonstrated few instances of ventricular tachycardia and SVT).  I advised the patient to discuss with her primary care provider blood pressure management and additional cardiology evaluation.    Unfortunately, due to constraints of time, we were not able to discuss her headaches in details.  I advised the patient to reduce the Tylenol use as it could lead to rebound  headaches.  She was recommended to schedule a separate visit to discuss her headaches in detail once she addresses her dizziness and elevated blood pressure.    DIAGNOSES:    ICD-10-CM    1. Dizziness R42    2. Headache disorder R51    3. 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  I reviewed with the patient and her daughter that I do not see neurological explanations for her ongoing dizziness.  Shirley to follow up with Primary Care provider regarding elevated blood pressure.  She will also discuss additional cardiology evaluation.      Total Time:  82 minutes with > 50% spent counseling the patient and her daughter on stated above assessment and recommendations, including nature of the diagnosis, MRI/MRA results, available treatment options, and proposed plan.  Additional time was used to answer questions regarding patient's symptoms, my recommendations, and the plan.    Edy Minor MD  / Neurology  [place]  (Chart documentation was completed in part with Dragon voice-recognition software. Even though reviewed, some grammatical, spelling, and word errors may remain.)

## 2020-02-13 ENCOUNTER — OFFICE VISIT (OUTPATIENT)
Dept: NEUROLOGY | Facility: CLINIC | Age: 60
End: 2020-02-13
Payer: COMMERCIAL

## 2020-02-13 VITALS
HEIGHT: 62 IN | BODY MASS INDEX: 24.14 KG/M2 | HEART RATE: 53 BPM | SYSTOLIC BLOOD PRESSURE: 182 MMHG | DIASTOLIC BLOOD PRESSURE: 81 MMHG | OXYGEN SATURATION: 99 %

## 2020-02-13 DIAGNOSIS — R51.9 HEADACHE DISORDER: ICD-10-CM

## 2020-02-13 DIAGNOSIS — R42 DIZZINESS: Primary | ICD-10-CM

## 2020-02-13 DIAGNOSIS — G60.0 CMTX (X-LINKED DOMINANT CHARCOT MARIE TOOTH NEUROPATHY): ICD-10-CM

## 2020-02-13 PROCEDURE — 99205 OFFICE O/P NEW HI 60 MIN: CPT | Performed by: PSYCHIATRY & NEUROLOGY

## 2020-02-13 ASSESSMENT — MIFFLIN-ST. JEOR: SCORE: 1112.46

## 2020-02-13 NOTE — LETTER
"    2/13/2020         RE: Shirley Hendricks  [address]  [facility] [address]        Dear Colleague,    Thank you for referring your patient, Shirley Hendricks, to the [facility]. Please see a copy of my visit note below.    INITIAL NEUROLOGY CONSULTATION    DATE OF VISIT: 2/13/2020  CLINIC LOCATION: [facility]  MRN: [id number]  PATIENT NAME: Shirley Hendricks  YOB: 1960    PRIMARY CARE PROVIDER: Vasquez Benoit MD     REASON FOR VISIT:   Chief Complaint   Patient presents with     Dizziness     sep 17 felt like an \"acid trip\"      HISTORY OF PRESENT ILLNESS:                                                    Ms. Shirley Hendricks is 59 year old right handed female patient with past medical history of hyperlipidemia, hypertension, peripheral polyneuropathy, TIA, status post carotid endarterectomy, depression, and anxiety, who was seen in consultation today requested by Vasquez Benoit MD, for headache management.  However, the patient states that her most troubling symptom that she wants to discuss today is dizziness.  She is accompanied by her daughter, who participates in interview.    Per patient's report, she acutely developed dizziness/lightheadedness on 09/17/2019 while visiting her sister.  It felt different from previously experienced (in July and August 2019) 2 episodes of BPPV.  Dizziness subsided with hydration, but recurred the next day on her way to work, \"the road felt and looked wobbly\".  She was not able to clearly see road signs and stoplights of cars in front of her.  Also experienced nausea.  When she arrived to work, she realized that she is not able to drive at all.  Talk to her supervisor and called her son to drive her to the emergency department of [facility].    At that time her blood pressure was 190/91.  Other vital signs were unremarkable.  Brain MRI with and without contrast from 09/18/2019 demonstrated " bifrontal encephalomalacia, felt to be related to old trauma and unchanged from previous MRI from 2016, without other acute intracranial findings.  Head MRA from the same day was limited by motion, but did not demonstrate any appreciable abnormality.  Neck MRA demonstrated 60% narrowing of the proximal left internal carotid artery and 15% narrowing of the proximal right internal carotid artery, unchanged from 2016.    Was discharged home, but her dizziness continued.  She underwent 2-week heart monitoring that demonstrated 3 ventricular tachycardia runs (longest was 10 beats) and 47 SVT runs (with longest of 20 beats) without clinical symptoms.    At the present time, the patient continues to experience intermittent dizziness, described as lightheadedness.  It occurs from once in 3 days up to twice per day lasting from 2 to 3 minutes to 5 to 6 minutes.  Associated symptoms include blurry vision and occasional oscillopsia.  The episode could be triggered by the change of her position from sitting to standing, quick body turns or quick head turns while sitting, though not every time she does these movements.  Driving, doing housework, sitting at the computer, or other activity make symptoms worse.  Resting or sleeping make her feel better.  She tried Tylenol, increased hydration, and reduced caffeine intake without significant changes.    In addition, the patient reports daily headaches that were not discussed in details today.  She reports that she takes 6 to 8 tablets of extra strength Tylenol per day every day.  She also has neuropathy that affects her walking (previously evaluated).    Recent laboratory evaluation from September-December 2019 includes unremarkable CMP, normal LDL (723), and sed rate (9).    The patient was previously seen in [facility] stroke neurology clinic in 2016 (Dr. Azul) for symptomatic right ICA stenosis.  Underwent carotid cerebral angiogram that demonstrated 65% stenosis of the  right internal carotid artery and 12% stenosis of the left internal carotid artery in addition to small segment of fenestration at the proximal right A2 of PRICILLA.  She had right carotid endarterectomy in May 2016.    No additional useful information is available in Care Everywhere, which was reviewed.    Review of Systems - the patient endorses multiple chronic complaints on 14-point comprehensive review of systems of the Patient Health History Form, that were reviewed and will be scanned into her electronic medical record at the end of this encounter. Her primary care and other medical providers are aware and addressing all of them.  PAST MEDICAL/SURGICAL HISTORY:                                                    I personally reviewed patient's past medical and surgical history with the patient at today's visit.  Patient Active Problem List   Diagnosis     Reflux esophagitis     Health Care Home     Osteoporosis     Cervicalgia     Hyperlipidemia LDL goal <70     PAD (peripheral artery disease) (H)     Essential hypertension     Neuropathy     Coronary artery disease involving native coronary artery of native heart without angina pectoris     Primary osteoarthritis involving multiple joints     DDD (degenerative disc disease), lumbar     Chronic pain syndrome     Stroke (H)     Bilateral carotid artery stenosis     S/P carotid endarterectomy     Chronic allergic rhinitis due to animal hair and dander     Tobacco use disorder     Chronic obstructive pulmonary disease, unspecified COPD type (H)     Mild major depression (H)     Anxiety     Vitamin C deficiency     Past Medical History:   Diagnosis Date     Anxiety 12/7/2017     COPD (chronic obstructive pulmonary disease) (H)      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 Simons dermatology     Embolism and thrombosis of unspecified artery (H) 8/00    Protein C,S, Leiden FV, Lupus Inhibitor Negative     Gastroesophageal reflux  disease      Hyperlipidaemia      Hypertension      Lupus (H)     skin     Mild major depression (H) 11/7/2017     Myocardial infarction (H)     x3     Osteoarthrosis, unspecified whether generalized or localized, unspecified site      PAD (peripheral artery disease) (H)      Peripheral vascular disease, unspecified (H) 12/00    s/p angioplasty with stent right femoral a.; Right iliac and femoral a. clot     Post-menopausal      Reflux esophagitis 2/04    EGD: esophagitis and medium HH     Uncomplicated asthma      Vitamin C deficiency 8/12/2018     Past Surgical History:   Procedure Laterality Date     ANGIOGRAM       C FABRIC WRAPPING OF ABDOMINAL ANEURYSM       C NONSPECIFIC PROCEDURE  12/00    angioplasty with stent right fem. a.     C NONSPECIFIC PROCEDURE  1987    sinus surgery     C NONSPECIFIC PROCEDURE  9/2/2009    Emergent left groin exploration with oversewing of bleeding angiographic site. 2. Endarterectomy of common femoral-proximal superficial femoral artery with greater saphenous vein patch angioplasty.   a. Narka of accessory right greater saphenous vein.      C NONSPECIFIC PROCEDURE  8/27/2009    occluded left common iliac and external iliac arteries were successfully revascularized with stenting to 8 and 7 mm      CARDIAC CATHERIZATION  9/3/2009    multivessel CAD without target lesions, med mgmt indicated, preserved ef     ENDARTERECTOMY CAROTID Right 5/11/2016    Procedure: ENDARTERECTOMY CAROTID;  Surgeon: Chadwick Lozano MD;  Location:  OR     GYN SURGERY  left tube    left salpingectomy     LAPAROSCOPIC CHOLECYSTECTOMY N/A 9/27/2017    Procedure: LAPAROSCOPIC CHOLECYSTECTOMY;  LAPAROSCOPIC CHOLECYSTECTOMY;  Surgeon: Jacoby Tapia MD;  Location: [facility]     ORTHOPEDIC SURGERY      left knee surgery     VASCULAR SURGERY  aoto bi fem  left fem-AK pop     MEDICATIONS:                                                    I personally reviewed patient's medications and allergies  with the patient at today's visit.  Acetaminophen (TYLENOL PO), Take 1,000-1,500 mg by mouth every 6 hours as needed for mild pain or fever   ASPIRIN EC PO, Take 81 mg by mouth daily  atorvastatin (LIPITOR) 80 MG tablet, TAKE ONE TABLET (80MG) BY MOUTH EVERY EVENING INDICATION:TO LOWER CHOLESTEROL AND TO HELP REDUCE RISK OF REOCURRENCE OF HEAR ATTACK STROKE,A  CALCIUM 600-D 600-400 MG-UNIT TABS, Take 1 tablet by mouth 2 times daily  clopidogrel (PLAVIX) 75 MG tablet, Take 1 tablet (75 mg) by mouth daily  denosumab (PROLIA) 60 MG/ML SOLN injection, Inject 1 mL (60 mg) Subcutaneous every 6 months INDICATION: TO TREAT OSTEOPOROSIS  fluticasone-vilanterol (BREO ELLIPTA) 200-25 MCG/INH inhaler, Inhale 1 puff into the lungs daily INDICATION: COPD CONTROLLER  lisinopril (PRINIVIL/ZESTRIL) 20 MG tablet, Take 1 tablet (20 mg) by mouth 2 times daily  metoprolol succinate ER (TOPROL-XL) 25 MG 24 hr tablet, Take 1 tablet (25 mg) by mouth daily  nitroGLYcerin (NITROSTAT) 0.4 MG sublingual tablet, Place 1 tablet (0.4 mg) under the tongue See Admin Instructions for chest pain  omeprazole (PRILOSEC) 20 MG DR capsule, 1 capsule daily as needed.  ALLERGIES:                                                      Allergies   Allergen Reactions     Contrast Dye Anaphylaxis     RASH, FACIAL AND NECK SWELLING, SOB, WHEEZING     Pantoprazole      Protonix caused diffuse edema     Penicillins Itching     FAMILY/SOCIAL HISTORY:                                                    Family and social history was reviewed with the patient at today's visit.   Family history is positive for high-arched feet, brain tumor, migraine, and headache.  Current everyday smoker, counseled to quit.  Denies current alcohol and recreational drug use.  , lives with her family.  Works part-time as a [employer] .  REVIEW OF SYSTEMS:                                                    Patient has completed a [facility] Patient [facility]  "History, including a 14-system review, which was personally reviewed, and pertinent positives are listed in HPI. She denies any additional problems on the further questioning.  EXAM:                                                    VITAL SIGNS:   BP (!) 182/81 (BP Location: Right arm, Patient Position: Standing, Cuff Size: Adult Regular)   Pulse 53   Temp (P) 97.8  F (36.6  C) (Oral)   Ht 1.575 m (5' 2\")   Wt (P) 59.4 kg (131 lb)   SpO2 99%   BMI (P) 23.96 kg/m      Orthostatic vital signs:  Vitals:    02/13/20 1523 02/13/20 1531   BP: (!) 194/82 (!) 182/81   BP Location: Right arm Right arm   Patient Position: Supine Standing   Cuff Size: Adult Regular Adult Regular   Pulse: 59 53   Temp: (P) 97.8  F (36.6  C)    TempSrc: (P) Oral    SpO2: 99%    Weight: (P) 59.4 kg (131 lb)    Height: 1.575 m (5' 2\")      Mini-Cog Assessment:  Mini Cog Assessment  Clock Draw Score: 2 Normal  3 Item Recall: 2 objects recalled  Mini Cog Total Score: 4    General: pt is in NAD, cooperative.  Skin: normal turgor, moist mucous membranes, no lesions/rashes noticed.  HEENT: ATNC, EOMI, PERRL, white sclera, normal conjunctiva, no nystagmus or ptosis. No carotid bruits bilaterally.  Respiratory: lung sounds clear to auscultation bilaterally, no crackles, wheezes, rhonchi. Symmetric lung excursion, no accessory respiratory muscle use.  Cardiovascular: normal S1/S2, no murmurs/rubs/gallops.   Abdomen: Not distended.  : deferred.    Neurological:  Mental: alert, follows commands, Mini Cog Total Score: 4/5 with 2/3 on memory recall, no aphasia or dysarthria. Fund of knowledge is appropriate for age.  Cranial Nerves:  CN II: visual acuity - able to accurately count fingers with each eye. Visual fields intact, fundi: discs sharp, no papilledema and normal vessels bilaterally.  CN III, IV, VI: EOM intact, pupils equal and reactive  CN V: facial sensation nl  CN VII: face symmetric, no facial droop  CN VIII: hearing normal  CN IX: palate " elevation symmetric, uvula at midline  CN XI SCM normal, shoulder shrug nl  CN XII: tongue midline  Motor: Strength: 5/5 in all major groups of upper and proximal lower extremities, it is 4-/5 in distal lower extremities. Normal tone. No abnormal movements. No pronator drift b/l.  Has high arches and hammertoes of both feet.  Reflexes: Triceps, biceps, and brachioradialis reflexes are normal and symmetric, patellar reflexes are reduced bilaterally, and achilles are absent bilaterally. No clonus noted. Toes are down-going b/l.   Sensory: temperature, light touch, pinprick, and vibration reduced in both lower extremities below the knees.  Coordination: FNF and heel-shin tests intact b/l.  Gait: Unsteady with bilateral steppage.  DATA:   LABS/IMAGING/OTHER STUDIES: I reviewed pertinent medical records, including personal review of brain MRI/head and neck MRA images, prior neurology notes, and Care Everywhere, as detailed in the history of present illness.  ASSESSMENT AND PLAN:      ASSESSMENT: Shirley Hendricks is a 59 year old female patient with past medical history of hyperlipidemia, hypertension, neuropathy, TIA, status post carotid endarterectomy, depression, and anxiety, who presents with persistent dizziness/lightheadedness since September 2019.    We had a detailed discussion with the patient and her daughter regarding her presenting complaints.  Her neurological exam today is supportive of known history of peripheral polyneuropathy (likely CMT).  Orthostatic testing is negative, but blood pressure is high.  We reviewed recent brain MRI, head and neck MRA images together.    The clinical presentation is not consistent with peripheral or central vestibular dysfunction.  Most likely the symptoms are due to uncontrolled blood pressure and possibly unrecognized arrhythmia (her recent heart monitoring demonstrated few instances of ventricular tachycardia and SVT).  I advised the patient to discuss with her  primary care provider blood pressure management and additional cardiology evaluation.    Unfortunately, due to constraints of time, we were not able to discuss her headaches in details.  I advised the patient to reduce the Tylenol use as it could lead to rebound headaches.  She was recommended to schedule a separate visit to discuss her headaches in detail once she addresses her dizziness and elevated blood pressure.    DIAGNOSES:    ICD-10-CM    1. Dizziness R42    2. Headache disorder R51    3. 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  I reviewed with the patient and her daughter that I do not see neurological explanations for her ongoing dizziness.  Shirley to follow up with Primary Care provider regarding elevated blood pressure.  She will also discuss additional cardiology evaluation.      Total Time:  82 minutes with > 50% spent counseling the patient and her daughter on stated above assessment and recommendations, including nature of the diagnosis, MRI/MRA results, available treatment options, and proposed plan.  Additional time was used to answer questions regarding patient's symptoms, my recommendations, and the plan.    Edy Minor MD  / Neurology  [place]  (Chart documentation was completed in part with Dragon voice-recognition software. Even though reviewed, some grammatical, spelling, and word errors may remain.)            Again, thank you for allowing me to participate in the care of your patient.        Sincerely,        Edy Minor MD

## 2020-02-16 ENCOUNTER — MYC MEDICAL ADVICE (OUTPATIENT)
Dept: INTERNAL MEDICINE | Facility: CLINIC | Age: 60
End: 2020-02-16

## 2020-02-16 ENCOUNTER — NURSE TRIAGE (OUTPATIENT)
Dept: NURSING | Facility: CLINIC | Age: 60
End: 2020-02-16

## 2020-02-16 ENCOUNTER — HOSPITAL ENCOUNTER (EMERGENCY)
Facility: CLINIC | Age: 60
Discharge: HOME | End: 2020-02-16
Attending: EMERGENCY MEDICINE | Admitting: EMERGENCY MEDICINE
Payer: COMMERCIAL

## 2020-02-16 ENCOUNTER — APPOINTMENT (OUTPATIENT)
Dept: GENERAL RADIOLOGY | Facility: CLINIC | Age: 60
End: 2020-02-16
Attending: EMERGENCY MEDICINE
Payer: COMMERCIAL

## 2020-02-16 VITALS
OXYGEN SATURATION: 95 % | DIASTOLIC BLOOD PRESSURE: 75 MMHG | RESPIRATION RATE: 27 BRPM | SYSTOLIC BLOOD PRESSURE: 165 MMHG | HEART RATE: 56 BPM | TEMPERATURE: 97.3 F | HEIGHT: 62 IN | BODY MASS INDEX: 24.29 KG/M2 | WEIGHT: 132 LBS

## 2020-02-16 DIAGNOSIS — R07.9 CHEST PAIN, UNSPECIFIED TYPE: ICD-10-CM

## 2020-02-16 DIAGNOSIS — R06.02 SHORTNESS OF BREATH: ICD-10-CM

## 2020-02-16 DIAGNOSIS — I10 BENIGN ESSENTIAL HYPERTENSION: ICD-10-CM

## 2020-02-16 DIAGNOSIS — I10 ESSENTIAL HYPERTENSION: Primary | ICD-10-CM

## 2020-02-16 LAB
ANION GAP SERPL CALCULATED.3IONS-SCNC: 5 MMOL/L (ref 3–14)
BASOPHILS # BLD AUTO: 0.1 10E9/L (ref 0–0.2)
BASOPHILS NFR BLD AUTO: 1.2 %
BUN SERPL-MCNC: 12 MG/DL (ref 7–30)
CALCIUM SERPL-MCNC: 8.9 MG/DL (ref 8.5–10.1)
CHLORIDE SERPL-SCNC: 105 MMOL/L (ref 94–109)
CO2 SERPL-SCNC: 26 MMOL/L (ref 20–32)
CREAT SERPL-MCNC: 0.52 MG/DL (ref 0.52–1.04)
DIFFERENTIAL METHOD BLD: NORMAL
EOSINOPHIL # BLD AUTO: 0.1 10E9/L (ref 0–0.7)
EOSINOPHIL NFR BLD AUTO: 1.5 %
ERYTHROCYTE [DISTWIDTH] IN BLOOD BY AUTOMATED COUNT: 13.4 % (ref 10–15)
GFR SERPL CREATININE-BSD FRML MDRD: >90 ML/MIN/{1.73_M2}
GLUCOSE SERPL-MCNC: 86 MG/DL (ref 70–99)
HCT VFR BLD AUTO: 40.1 % (ref 35–47)
HGB BLD-MCNC: 13.3 G/DL (ref 11.7–15.7)
IMM GRANULOCYTES # BLD: 0 10E9/L (ref 0–0.4)
IMM GRANULOCYTES NFR BLD: 0.2 %
LYMPHOCYTES # BLD AUTO: 1.6 10E9/L (ref 0.8–5.3)
LYMPHOCYTES NFR BLD AUTO: 26 %
MCH RBC QN AUTO: 31 PG (ref 26.5–33)
MCHC RBC AUTO-ENTMCNC: 33.2 G/DL (ref 31.5–36.5)
MCV RBC AUTO: 94 FL (ref 78–100)
MONOCYTES # BLD AUTO: 0.4 10E9/L (ref 0–1.3)
MONOCYTES NFR BLD AUTO: 6.5 %
NEUTROPHILS # BLD AUTO: 3.9 10E9/L (ref 1.6–8.3)
NEUTROPHILS NFR BLD AUTO: 64.6 %
NRBC # BLD AUTO: 0 10*3/UL
NRBC BLD AUTO-RTO: 0 /100
PLATELET # BLD AUTO: 176 10E9/L (ref 150–450)
POTASSIUM SERPL-SCNC: 3.9 MMOL/L (ref 3.4–5.3)
RBC # BLD AUTO: 4.29 10E12/L (ref 3.8–5.2)
SODIUM SERPL-SCNC: 136 MMOL/L (ref 133–144)
TROPONIN I SERPL-MCNC: <0.015 UG/L (ref 0–0.04)
TSH SERPL DL<=0.005 MIU/L-ACNC: 0.89 MU/L (ref 0.4–4)
WBC # BLD AUTO: 6 10E9/L (ref 4–11)

## 2020-02-16 PROCEDURE — 99285 EMERGENCY DEPT VISIT HI MDM: CPT | Mod: 25

## 2020-02-16 PROCEDURE — 85025 COMPLETE CBC W/AUTO DIFF WBC: CPT | Performed by: EMERGENCY MEDICINE

## 2020-02-16 PROCEDURE — 71046 X-RAY EXAM CHEST 2 VIEWS: CPT

## 2020-02-16 PROCEDURE — 84484 ASSAY OF TROPONIN QUANT: CPT | Performed by: EMERGENCY MEDICINE

## 2020-02-16 PROCEDURE — 93005 ELECTROCARDIOGRAM TRACING: CPT

## 2020-02-16 PROCEDURE — 84443 ASSAY THYROID STIM HORMONE: CPT | Performed by: EMERGENCY MEDICINE

## 2020-02-16 PROCEDURE — 80048 BASIC METABOLIC PNL TOTAL CA: CPT | Performed by: EMERGENCY MEDICINE

## 2020-02-16 ASSESSMENT — ENCOUNTER SYMPTOMS
SHORTNESS OF BREATH: 1
FATIGUE: 1
HEADACHES: 1
WEAKNESS: 1
LIGHT-HEADEDNESS: 1

## 2020-02-16 ASSESSMENT — MIFFLIN-ST. JEOR: SCORE: 1117

## 2020-02-16 NOTE — ED AVS SNAPSHOT
Emergency Department  [address] [place] [id number]  Phone:  [phone]  Fax:  [phone]                                    Shirley Hendricks   MRN: [id number]    Department:   Emergency Department   Date of Visit:  2/16/2020           After Visit Summary Signature Page    I have received my discharge instructions, and my questions have been answered. I have discussed any challenges I see with this plan with the nurse or doctor.    ..........................................................................................................................................  Patient/Patient Representative Signature      ..........................................................................................................................................  Patient Representative Print Name and Relationship to Patient    ..................................................               ................................................  Date                                   Time    ..........................................................................................................................................  Reviewed by Signature/Title    ...................................................              ..............................................  Date                                               Time          22EPIC Rev 08/18

## 2020-02-16 NOTE — ED PROVIDER NOTES
History     Chief Complaint:  Hypertension    The history is provided by the patient.      Shirley Hendricks is a 59 year old female daily smoker, with history significant for hypertension, hyperlipidemia, prior MI and CVA anticoagulated on Plavix, who presents with concerns for lightheadedness. With compliance to Lisinopril, Shirley states her baseline systolic blood pressure have been 123-138 over the past twenty years this week  her systolic have ranged from 150-210 . Today, her systolic ranged from 160-212. Her diastolics are in the 70s to 90s.  Coupling this, patient complains of dyspnea on-exertion over the past six weeks and reports 5-minute episodes of chest pain that may occur in the  morning after waking up that usually resolves following a burp or when she is sitting on her bus. . Notably, she attest to lightheadedness since September of 2019 and complains of significant lethargy over the last several weeks. Patient saw  her primary Dr. Benoit in December for her symptoms and had a heart monitor done in December that showed 3 episodes of V. tach with the longest being 10 beats and 47 episodes of SVT with longest being 20 beats.. She denies any chest pain on exertion and attest to chronic headaches. She denies any tobacco and drug use. Shirley acknowledges stressful home life secondary to his  going blind three years prior. She has history of hyperlipidemia, hypertension, and smoking use.  She tells me she has had 4 previous MIs but I do not see any documented record of that in the chart.  She does not believe she is had a stress test for many years.  Allergies:  Contrast dye  Pantoprazole  Penicillins      Medications:    Aspirin  Plavix  Lipitor  Lisinopril  Nitrostat  Prilosec  Lisinopril     Past Medical History:    Anxiety  COPD  Embolism and thrombosis of unspecified artery  GERD  Lupus  Hyperlipidemia  MI  CVA  Depression, major  PAD  Reflux  Asthma  Vitamin C deficiency    Past Surgical  "History:    carotid endarterectomy, right  Sinus surgery  Occluded left common iliac and external iliac arteries were successfully revascularized with stenting to 8 and 7 mm   Left salpingectomy  Left knee surgery  Laparoscopic cholecystectomy  Fabric wrapping of abdominal aneurysm    Family History:    Bladder cancer  Multiple MIs    Social History:  Accompanied by Beatrice, her sister.  PCP: Vasquez Benoit  Patient lives at home with her  and two sons.  Employment:    Daily Smoker (PPD 0.25)  Alcohol Use: Positive  Drug Use: No  Marital Status:      Review of Systems   Constitutional: Positive for fatigue.   Respiratory: Positive for shortness of breath.    Cardiovascular: Positive for chest pain.   Neurological: Positive for weakness, light-headedness and headaches.   All other systems reviewed and are negative.    Physical Exam   Patient Vitals for the past 24 hrs:   BP Temp Temp src Pulse Heart Rate Resp SpO2 Height Weight   02/16/20 1909 -- -- -- -- 60 27 95 % -- --   02/16/20 1900 (!) 165/75 -- -- 56 56 14 95 % -- --   02/16/20 1839 (!) 166/99 -- -- 59 -- -- 97 % -- --   02/16/20 1819 (!) 163/85 -- -- 121 -- 28 -- -- --   02/16/20 1532 (!) 192/77 97.3  F (36.3  C) Temporal 65 -- 18 97 % 1.575 m (5' 2\") 59.9 kg (132 lb)     Physical Exam  Constitutional:  Oriented to person, place, and time.      Appears well-developed and well-nourished.   HENT:   Head:    Normocephalic and atraumatic.   Right Ear:   Tympanic membrane and external ear normal.   Left Ear:   Tympanic membrane and external ear normal.   Mouth/Throat:   Oropharynx is clear and moist.      Mucous membranes are normal.   Eyes:    Conjunctivae normal and EOM are normal.      Pupils are equal, round, and reactive to light.   Neck:    Normal range of motion. Neck supple.   Cardiovascular:  Normal rate, regular rhythm, S1 normal and S2 normal.      No gallop and no friction rub. No murmur heard.  Pulmonary/Chest:  Breath sounds " normal. No respiratory distress.      No wheezes. No rhonchi. No rales.   Abdominal:   Soft. No hepatosplenomegaly. No tenderness.      No rebound and no CVA tenderness.   Musculoskeletal:  Normal range of motion.   Neurological:   Alert and oriented to person, place, and time. Normal strength.      GCS eye subscore is 4. GCS verbal subscore is 5.      GCS motor subscore is 6.   Skin:    Skin is warm and dry.   Psychiatric:   Normal mood and affect.      Speech is normal and behavior is normal.      Judgment and thought content normal.      Cognition and memory are normal.     Emergency Department Course   ECG:  ECG taken at 1816, ECG read at 1820  Normal sinus rhythm  Low voltage QRS  Septal infarct, age undetermined  Abnormal ECG  Rate 61 bpm. NM interval 140 ms. QRS duration 84 ms. QT/QTc 420/422 ms. P-R-T axes 36 28 60.  No significant changes compared to prior ECG on 01/20/19.     Imaging:  XR Chest 2 Views  No focal infiltrate or consolidation. No pleural fluid or pneumothorax. Normal cardiac enlargement. Normal pulmonary vascularity. Advanced atherosclerotic vascular calcification. Degenerative changes in the spine.  CURT L BEHRNS, MD  Reading per radiology    Laboratory:  CBC: WBC 6.0, HGB 13.3,   BMP: WNL (Creatinine 0.52)    Troponin (Collected 1824): <0.015    TSH with free T4 reflex: 0.89    Emergency Department Course:  Nursing notes and vitals reviewed.  1800 I performed an exam of the patient as documented above.  1816 EKG obtained in the ED, see results above.   1824 Blood was drawn for laboratory testing, results above.  1834 The patient was sent for a XR while in the emergency department, results above.   1909 Findings and plan explained to the patient. Patient discharged home with instructions regarding supportive care, medications, and reasons to return. The importance of close follow-up was reviewed.     Impression & Plan      Medical Decision Making:  The patient is a 59-year-old female  with a history of multiple medical problems who comes in with multiple complaints today.  She is concerned her blood pressures been higher this week.  In the past that usually been in the 120s to 130s.  She she has chronic headaches.  She has had lightheadedness since September for which she saw Dr. Benoit in December and more recently  the neurologist.  She notes shortness of breath more present with exertion over the last 6 weeks with good oxygen saturations here.  She notes occasional chest pain primarily in the morning when she is sitting on her bus which lasts for about 5 minutes and is not related to exertion.  Her evaluation here is unremarkable in terms of studies done.   The patient has significant cardiac risk factors but no current pain and normal troponin.  It is the holiday weekend and she would not be able to have a nuclear stress test until after the weekend.  I think it is reasonable for her to go home and have this done as an outpatient with the agreement that she would return if she had any worsening or persistent chest pain.  I did note that she could be admitted if she did not feel comfortable going home to wait stress testing.  Her blood pressure is high and we did discuss whether to start her on another blood pressure medication.  She is concerned because she has  had a side effects from some in the past.  As her blood pressures generally run okay and her pressures are not extreme here I think it is reasonable to have her keep a log of her blood pressures and then to follow-up with Dr. Benoit in the next few days to evaluate whether she needs additional blood pressure control.  The patient has multiple issues and her best care is through her primary care physician and she was advised to follow with him.  She should return immediately if she has any worsening symptoms.    Diagnosis:    ICD-10-CM   1. Chest pain, unspecified type R07.9   2. HTN  3.Dyspnea   Disposition: discharged to home    Scribe  Disclosure:   I, Torrey Villanueva, am serving as a scribe at 5:18 PM on 2/16/2020 to document services personally performed by Estela Bowen MD based on my observations and the provider's statements to me.      EMERGENCY DEPARTMENT       Estela Bowen MD  02/16/20 0444

## 2020-02-16 NOTE — TELEPHONE ENCOUNTER
Some chest discomfort and blurred vision has been coming and going this weekend. Occasionally SOB. Always has a headache, since her ED visit on 9/18/19, for dizziness and vertigo. Her blood pressure running higher than usual this weekend. Several times top number above 200. Right now 195/77. Strong cardiac history. I advised someone to bring her to the ED now. She has someone there that is drivable and will do so now.     Ivania Murdock RN  [facility]  Triage Nurse Advisors    Reason for Disposition    [1] Systolic BP  >= 160 OR Diastolic >= 100 AND [2] cardiac or neurologic symptoms (e.g., chest pain, difficulty breathing, unsteady gait, blurred vision)    Additional Information    Negative: Difficult to awaken or acting confused (e.g., disoriented, slurred speech)    Negative: Severe difficulty breathing (e.g., struggling for each breath, speaks in single words)    Negative: [1] Weakness of the face, arm or leg on one side of the body AND [2] new onset    Negative: [1] Numbness (i.e., loss of sensation) of the face, arm or leg on one side of the body AND [2] new onset    Negative: [1] Chest pain lasts > 5 minutes AND [2] history of heart disease  (i.e., heart attack, bypass surgery, angina, angioplasty, CHF)    Negative: [1] Chest pain AND [2] took nitrogylcerin AND [3] pain was not relieved    Negative: Sounds like a life-threatening emergency to the triager    Negative: Symptom is main concern  (e.g., headache, chest pain)    Negative: Low blood pressure is main concern    Protocols used: HIGH BLOOD PRESSURE-A-

## 2020-02-16 NOTE — LETTER
February 16, 2020      To Whom It May Concern:      Shirley Hendricks was seen in our Emergency Department today, 02/16/20.  She may return to work on 2/24/2020.  Sincerely,        Rocío Cary RN

## 2020-02-17 LAB — INTERPRETATION ECG - MUSE: NORMAL

## 2020-02-17 NOTE — DISCHARGE INSTRUCTIONS
I have ordered a stress test and you should be hearing from [facility].  If you not hear from them by noon on call their office.  If you have problems getting appointments call here to [facility] at [phone].  Keep a record of your blood pressure 1-3 times a day at different times the day to take to Dr. Benoit.  Return if you have any worsening chest pain.  Discharge Instructions  Chest Pain    You have been seen today for chest pain or discomfort.  At this time, your provider has found no signs that your chest pain is due to a serious or life-threatening condition, (or you have declined more testing and/or admission to the hospital). However, sometimes there is a serious problem that does not show up right away. Your evaluation today may not be complete and you may need further testing and evaluation.     Generally, every Emergency Department visit should have a follow-up clinic visit with either a primary or a specialty clinic/provider. Please follow-up as instructed by your emergency provider today.  Return to the Emergency Department if:  Your chest pain changes, gets worse, starts to happen more often, or comes with less activity.  You are newly short of breath.  You get very weak or tired.  You pass out or faint.  You have any new symptoms, like fever, cough, numb legs, or you cough up blood.  You have anything else that worries you.    Until you follow-up with your regular provider, please do the following:  Take one aspirin daily unless you have an allergy or are told not to by your provider.  If a stress test appointment has been made, go to the appointment.  If you have questions, contact your regular provider.  Follow-up with your regular provider/clinic as directed; this is very important.    If you were given a prescription for medicine here today, be sure to read all of the information (including the package insert) that comes with your prescription.  This will include important information about the  medicine, its side effects, and any warnings that you need to know about.  The pharmacist who fills the prescription can provide more information and answer questions you may have about the medicine.  If you have questions or concerns that the pharmacist cannot address, please call or return to the Emergency Department.  You need to hold your metoprolol for 24 hours before your stress test.      Remember that you can always come back to the Emergency Department if you are not able to see your regular provider in the amount of time listed above, if you get any new symptoms, or if there is anything that worries you.

## 2020-02-17 NOTE — TELEPHONE ENCOUNTER
PCP please see [facility] message with BP readings.     Patient was seen in ER on 2/16/2020.     Upcoming OV on 3/12/2020 for Pre-op.     Fyi,     BP Readings from Last 3 Encounters:   02/16/20 (!) 165/75   02/13/20 (!) 182/81   12/06/19 132/64         Simona SANTANAN, RN, PHN

## 2020-02-18 RX ORDER — AMLODIPINE BESYLATE 5 MG/1
5 TABLET ORAL DAILY
Qty: 30 TABLET | Refills: 11 | Status: SHIPPED | OUTPATIENT
Start: 2020-02-18 | End: 2020-02-21 | Stop reason: DRUGHIGH

## 2020-02-19 ENCOUNTER — HOSPITAL ENCOUNTER (OUTPATIENT)
Dept: CARDIOLOGY | Facility: CLINIC | Age: 60
Discharge: HOME | End: 2020-02-19
Attending: EMERGENCY MEDICINE | Admitting: EMERGENCY MEDICINE
Payer: COMMERCIAL

## 2020-02-19 VITALS
SYSTOLIC BLOOD PRESSURE: 152 MMHG | HEART RATE: 52 BPM | WEIGHT: 127.4 LBS | DIASTOLIC BLOOD PRESSURE: 74 MMHG | HEIGHT: 62 IN | BODY MASS INDEX: 23.45 KG/M2 | OXYGEN SATURATION: 95 %

## 2020-02-19 DIAGNOSIS — R06.02 SHORTNESS OF BREATH: ICD-10-CM

## 2020-02-19 DIAGNOSIS — R07.9 CHEST PAIN, UNSPECIFIED TYPE: ICD-10-CM

## 2020-02-19 LAB
CV STRESS MAX HR HE: 79
NUC STRESS EJECTION FRACTION: 65 %
RATE PRESSURE PRODUCT: NORMAL
STRESS ECHO BASELINE DIASTOLIC HE: 74
STRESS ECHO BASELINE HR: 53
STRESS ECHO BASELINE SYSTOLIC BP: 152
STRESS ECHO CALCULATED PERCENT HR: 50 %
STRESS ECHO LAST STRESS DIASTOLIC BP: 78
STRESS ECHO LAST STRESS SYSTOLIC BP: 174
STRESS ECHO TARGET HR: 159

## 2020-02-19 PROCEDURE — 34300033 ZZH RX 343: Performed by: EMERGENCY MEDICINE

## 2020-02-19 PROCEDURE — 93018 CV STRESS TEST I&R ONLY: CPT | Performed by: INTERNAL MEDICINE

## 2020-02-19 PROCEDURE — 25000128 H RX IP 250 OP 636: Performed by: INTERNAL MEDICINE

## 2020-02-19 PROCEDURE — A9502 TC99M TETROFOSMIN: HCPCS | Performed by: EMERGENCY MEDICINE

## 2020-02-19 PROCEDURE — 78452 HT MUSCLE IMAGE SPECT MULT: CPT | Mod: 26 | Performed by: INTERNAL MEDICINE

## 2020-02-19 PROCEDURE — 78452 HT MUSCLE IMAGE SPECT MULT: CPT

## 2020-02-19 PROCEDURE — 93016 CV STRESS TEST SUPVJ ONLY: CPT | Performed by: INTERNAL MEDICINE

## 2020-02-19 RX ORDER — REGADENOSON 0.08 MG/ML
0.4 INJECTION, SOLUTION INTRAVENOUS ONCE
Status: COMPLETED | OUTPATIENT
Start: 2020-02-19 | End: 2020-02-19

## 2020-02-19 RX ORDER — ACYCLOVIR 200 MG/1
0-1 CAPSULE ORAL
Status: DISCONTINUED | OUTPATIENT
Start: 2020-02-19 | End: 2020-02-20 | Stop reason: HOSPADM

## 2020-02-19 RX ORDER — ALBUTEROL SULFATE 90 UG/1
2 AEROSOL, METERED RESPIRATORY (INHALATION) EVERY 5 MIN PRN
Status: DISCONTINUED | OUTPATIENT
Start: 2020-02-19 | End: 2020-02-20 | Stop reason: HOSPADM

## 2020-02-19 RX ORDER — AMINOPHYLLINE 25 MG/ML
50-100 INJECTION, SOLUTION INTRAVENOUS
Status: DISCONTINUED | OUTPATIENT
Start: 2020-02-19 | End: 2020-02-20 | Stop reason: HOSPADM

## 2020-02-19 RX ADMIN — REGADENOSON 0.4 MG: 0.08 INJECTION, SOLUTION INTRAVENOUS at 10:27

## 2020-02-19 RX ADMIN — TETROFOSMIN 3.6 MCI.: 1.38 INJECTION, POWDER, LYOPHILIZED, FOR SOLUTION INTRAVENOUS at 08:48

## 2020-02-19 RX ADMIN — TETROFOSMIN 9.51 MCI.: 1.38 INJECTION, POWDER, LYOPHILIZED, FOR SOLUTION INTRAVENOUS at 10:31

## 2020-02-19 ASSESSMENT — MIFFLIN-ST. JEOR: SCORE: 1096.13

## 2020-02-19 NOTE — TELEPHONE ENCOUNTER
Had seen ER note. Has stress test tomorrow and appt to see me this Friday. Spoke with pt. Still smoking 1/2 ppd. SBPs  OK in AM but will still rise to 180s  In PM.  hadd issue with hyponatremia in past with hydrochlorothiazide. Will  Ad Amlodipine 5mg daily. Rx faxed. Will see me this Friday

## 2020-02-21 ENCOUNTER — OFFICE VISIT (OUTPATIENT)
Dept: INTERNAL MEDICINE | Facility: CLINIC | Age: 60
End: 2020-02-21
Payer: COMMERCIAL

## 2020-02-21 ENCOUNTER — OFFICE VISIT (OUTPATIENT)
Dept: CARDIOLOGY | Facility: CLINIC | Age: 60
End: 2020-02-21
Payer: COMMERCIAL

## 2020-02-21 VITALS
HEIGHT: 62 IN | SYSTOLIC BLOOD PRESSURE: 163 MMHG | HEART RATE: 56 BPM | WEIGHT: 129 LBS | BODY MASS INDEX: 23.74 KG/M2 | DIASTOLIC BLOOD PRESSURE: 79 MMHG

## 2020-02-21 VITALS
WEIGHT: 130 LBS | TEMPERATURE: 98.1 F | HEART RATE: 57 BPM | DIASTOLIC BLOOD PRESSURE: 78 MMHG | BODY MASS INDEX: 23.78 KG/M2 | OXYGEN SATURATION: 98 % | RESPIRATION RATE: 16 BRPM | SYSTOLIC BLOOD PRESSURE: 136 MMHG

## 2020-02-21 DIAGNOSIS — I10 ESSENTIAL HYPERTENSION: Primary | ICD-10-CM

## 2020-02-21 DIAGNOSIS — Z86.0100 HISTORY OF COLONIC POLYPS: ICD-10-CM

## 2020-02-21 DIAGNOSIS — I10 ESSENTIAL HYPERTENSION: ICD-10-CM

## 2020-02-21 DIAGNOSIS — I25.10 CORONARY ARTERY DISEASE INVOLVING NATIVE CORONARY ARTERY OF NATIVE HEART WITHOUT ANGINA PECTORIS: ICD-10-CM

## 2020-02-21 DIAGNOSIS — F17.200 TOBACCO USE DISORDER: ICD-10-CM

## 2020-02-21 DIAGNOSIS — I47.10 SVT (SUPRAVENTRICULAR TACHYCARDIA) (H): ICD-10-CM

## 2020-02-21 DIAGNOSIS — E78.5 HYPERLIPIDEMIA LDL GOAL <70: ICD-10-CM

## 2020-02-21 PROCEDURE — 99204 OFFICE O/P NEW MOD 45 MIN: CPT | Performed by: INTERNAL MEDICINE

## 2020-02-21 PROCEDURE — 99214 OFFICE O/P EST MOD 30 MIN: CPT | Performed by: INTERNAL MEDICINE

## 2020-02-21 RX ORDER — NICOTINE 21 MG/24HR
1 PATCH, TRANSDERMAL 24 HOURS TRANSDERMAL EVERY 24 HOURS
Qty: 30 PATCH | Refills: 1 | Status: SHIPPED | OUTPATIENT
Start: 2020-02-21 | End: 2020-04-20

## 2020-02-21 RX ORDER — CARVEDILOL 6.25 MG/1
6.25 TABLET ORAL 2 TIMES DAILY WITH MEALS
Qty: 180 TABLET | Refills: 3 | Status: SHIPPED | OUTPATIENT
Start: 2020-02-21 | End: 2021-04-04

## 2020-02-21 RX ORDER — AMLODIPINE BESYLATE 10 MG/1
5 TABLET ORAL 2 TIMES DAILY
Qty: 90 TABLET | Refills: 3 | Status: ON HOLD | OUTPATIENT
Start: 2020-02-21 | End: 2020-06-08

## 2020-02-21 ASSESSMENT — MIFFLIN-ST. JEOR: SCORE: 1103.52

## 2020-02-21 NOTE — LETTER
2/21/2020    Vasquez Benoit MD  [address] [facility] [address]    RE: Shirley Hendricks       Dear Colleague,    I had the pleasure of seeing Shirley Hendricks in the [facility].    HPI and Plan:   Today, I had the pleasure of seeing Shirley Hendricks at [facility]. She is a pleasant 59 year old patient with a with a past medical history of chronic smoking, severe peripheral vascular disease requiring CEA, femoropopliteal bypass, hypertension, hyperlipidemia and coronary disease documented on [facility] in 2009 who presents to the clinic for an initial visit.    Patient is scheduled to undergo colonoscopy and clearance has been requested due to significant cardiac history.  The patient recently underwent nuclear stress test which showed an a small sized infarct in the anterolateral wall with mild lydia-infarct ischemia.  EF of 65%. This was different in the sense that previous nuclear stress test had shown ischemia but no infarct.  Coronary angiogram performed after that nuclear stress test showed mild to moderate, diffuse disease (30-50%) in mid segment of small caliber RCA and mid LAD in addition to total cclusion of 1 mm D1.  Medical management was recommended.  She also underwent a ZIO Patch recently which showed 3 NSVT events, longest 10 beats and 47 SVT runs, longest 20 beats.  No symptoms.  Last echocardiogram from 2016 showed normal LV size and systolic function, no WMA, no valvular disease.  EKG showed lower voltage complex, septal infarct but no ST-T wave changes or Q waves.    Clinically, the patient has been doing mediocre at best.  She says she is able to carry a load of laundry up the stairs without getting short of breath.  However, she in general feels tired and weak.  She has not tried exercising recently and lives a sedentary life.  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  She has smoked for 50 years and has  tried to cut down several times.  Currently she smokes half a pack a day and is trying to quit.  She has recently noted that her blood pressure has been on the elevated side to as high as 200s/100s.  She is currently on full doses of lisinopril and Norvasc and small dose of metoprolol.  She denies angina, chest discomfort, orthopnea, PND, presyncope or syncope.    Assessment and plan-   1.  Coronary artery disease  2.  Peripheral vascular disease  3.  Hypertension  4.  Hyperlipidemia  5.  Smoker  6.  Cardiovascular risk stratification before colonoscopy    Stress test showed small area of infarct in the inferolateral wall along with mild lydia-infarct ischemia.  Based on the ischemia trial I believe it is safe to manage her medically at this time.  In addition she does not report typical angina. She does report some shortness of breath on exertion which appears to be multifactorial and partly due to COPD.  I have discussed with her in great detail about necessity to quit smoking immediately.  She told me that it was easy for me to sit on the other side of the table and lectured her about quitting.  I sympathized with her but told her that given severity of vascular disease she has it is more or less now on ever.  She will go back on nicotine patch and try her best to give up smoking.  In the meantime I will switch her from low-dose metoprolol to Coreg which I think will be able to control her blood pressure better.  She already has an appointment to see me back in March and I asked her to keep that appointment.  She will bring her blood pressure diary to their appointment and I will start her on Imdur if blood pressure continues to be higher after switching to Coreg.  Her heart rate has been on a lower/off side and I told her to cut Coreg dose in half if she notices lightheadedness after starting Coreg.    As far as cardiac clearance is concerned the patient is greater than 4 METS as and I do not believe she requires  any further stratification before colonoscopy.    Plan  1.  Follow-up with me in 1 month  2.  Take your blood pressure regularly and bring your blood pressure diary to the next visit  3.  Stop metoprolol and start start 6.25 mg of Coreg twice daily.  4.  Quit smoking immediately  5.  Low risk for a low risk procedure (colonoscopy).     aboutThank you for allowing me to participate in the care of Shirley Hendricks    This note was completed in part using Dragon voice recognition software. Although reviewed after completion, some word and grammatical errors may occur.    Hayden Mason MD  Cardiology      CURRENT MEDICATIONS:  Current Outpatient Medications   Medication Sig Dispense Refill     amLODIPine 10 MG PO tablet Take 0.5 tablets (5 mg) by mouth 2 times daily 90 tablet 3     ASPIRIN EC PO Take 81 mg by mouth daily       atorvastatin (LIPITOR) 80 MG tablet TAKE ONE TABLET (80MG) BY MOUTH EVERY EVENING INDICATION:TO LOWER CHOLESTEROL AND TO HELP REDUCE RISK OF REOCURRENCE OF HEAR ATTACK STROKE,A 90 tablet 3     CALCIUM 600-D 600-400 MG-UNIT TABS Take 1 tablet by mouth 2 times daily 180 tablet 2     clopidogrel (PLAVIX) 75 MG tablet Take 1 tablet (75 mg) by mouth daily 90 tablet 3     denosumab (PROLIA) 60 MG/ML SOLN injection Inject 1 mL (60 mg) Subcutaneous every 6 months INDICATION: TO TREAT OSTEOPOROSIS 1 Syringe 0     fluticasone-vilanterol (BREO ELLIPTA) 200-25 MCG/INH inhaler Inhale 1 puff into the lungs daily INDICATION: COPD CONTROLLER 3 Inhaler 3     lisinopril (PRINIVIL/ZESTRIL) 20 MG tablet Take 1 tablet (20 mg) by mouth 2 times daily 180 tablet 3     metoprolol succinate ER (TOPROL-XL) 25 MG 24 hr tablet Take 1 tablet (25 mg) by mouth daily 90 tablet 3     omeprazole (PRILOSEC) 20 MG DR capsule 1 capsule daily as needed 90 capsule 1     Acetaminophen (TYLENOL PO) Take 1,000-1,500 mg by mouth every 6 hours as needed for mild pain or fever        nicotine 14 MG/24HR TD 24 hr patch Place 1 patch onto  the skin every 24 hours 30 patch 1     nitroGLYcerin (NITROSTAT) 0.4 MG sublingual tablet Place 1 tablet (0.4 mg) under the tongue See Admin Instructions for chest pain 30 tablet 11       ALLERGIES     Allergies   Allergen Reactions     Contrast Dye Anaphylaxis     RASH, FACIAL AND NECK SWELLING, SOB, WHEEZING     Pantoprazole      Protonix caused diffuse edema     Chantix [Varenicline]      Terrible dreams     Penicillins Itching       PAST MEDICAL HISTORY:  Past Medical History:   Diagnosis Date     Anxiety 12/7/2017     COPD (chronic obstructive pulmonary disease) (H)      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 clot     Post-menopausal      Reflux esophagitis 2/04    EGD: esophagitis and medium HH     Uncomplicated asthma      Vitamin C deficiency 8/12/2018       PAST SURGICAL HISTORY:  Past Surgical History:   Procedure Laterality Date     ANGIOGRAM       C FABRIC WRAPPING OF ABDOMINAL ANEURYSM       C NONSPECIFIC PROCEDURE  12/00    angioplasty with stent right fem. a.     C NONSPECIFIC PROCEDURE  1987    sinus surgery     C NONSPECIFIC PROCEDURE  9/2/2009    Emergent left groin exploration with oversewing of bleeding angiographic site. 2. Endarterectomy of common femoral-proximal superficial femoral artery with greater saphenous vein patch angioplasty.   a. [place] of accessory right greater saphenous vein.      C  NONSPECIFIC PROCEDURE  8/27/2009    occluded left common iliac and external iliac arteries were successfully revascularized with stenting to 8 and 7 mm      CARDIAC CATHERIZATION  9/3/2009    multivessel CAD without target lesions, med mgmt indicated, preserved ef     ENDARTERECTOMY CAROTID Right 5/11/2016    Procedure: ENDARTERECTOMY CAROTID;  Surgeon: Chadwick Lozano MD;  Location:  OR     GYN SURGERY  left tube    left salpingectomy     LAPAROSCOPIC CHOLECYSTECTOMY N/A 9/27/2017    Procedure: LAPAROSCOPIC CHOLECYSTECTOMY;  LAPAROSCOPIC CHOLECYSTECTOMY;  Surgeon: Jacoby Tapia MD;  Location: [facility]     ORTHOPEDIC SURGERY      left knee surgery     VASCULAR SURGERY  aoto bi fem  left fem-AK pop       FAMILY HISTORY:  Family History   Problem Relation Age of Onset     Cancer Mother         bladder     Cardiovascular Father         alive,multiple heart attacks     Diabetes Maternal Grandmother      Lung Cancer Maternal Grandmother      Blood Disease Brother         clotting disorder       SOCIAL HISTORY:  Social History     Socioeconomic History     Marital status:      Spouse name: None     Number of children: None     Years of education: None     Highest education level: None   Occupational History     None   Social Needs     Financial resource strain: None     Food insecurity:     Worry: None     Inability: None     Transportation needs:     Medical: None     Non-medical: None   Tobacco Use     Smoking status: Current Every Day Smoker     Packs/day: 0.25     Years: 40.00     Pack years: 10.00     Types: Cigarettes     Start date: 1958     Smokeless tobacco: Never Used     Tobacco comment: 1/2 PPD   Substance and Sexual Activity     Alcohol use: Yes     Alcohol/week: 0.0 standard drinks     Comment: x1-2 yr     Drug use: No     Sexual activity: Yes     Partners: Male     Birth control/protection: Surgical   Lifestyle     Physical activity:     Days per week: None     Minutes per  "session: None     Stress: None   Relationships     Social connections:     Talks on phone: None     Gets together: None     Attends Islam service: None     Active member of club or organization: None     Attends meetings of clubs or organizations: None     Relationship status: None     Intimate partner violence:     Fear of current or ex partner: None     Emotionally abused: None     Physically abused: None     Forced sexual activity: None   Other Topics Concern     Parent/sibling w/ CABG, MI or angioplasty before 65F 55M? Not Asked   Social History Narrative     None       Review of Systems:  Skin:  Negative       Eyes:  Positive for glasses glasses only as needed  ENT:  Negative      Respiratory:  Positive for dyspnea on exertion     Cardiovascular:  Negative for;edema lightheadedness;dizziness;Positive for;chest pain;palpitations;fatigue    Gastroenterology: Negative      Genitourinary:  Negative      Musculoskeletal:  Positive for neck pain    Neurologic:  Positive for headaches;migraine headaches dull headaches , some visual disturbances  Psychiatric:  Positive for excessive stress caregiver for   Heme/Lymph/Imm:         Endocrine:  Negative        Physical Exam:  Vitals: BP (!) 163/79   Pulse 56   Ht 1.575 m (5' 2.01\")   Wt 58.5 kg (129 lb)   BMI 23.59 kg/m     Constitutional: awake, alert, no distress  Skin: Warm and dry to touch  Head: Normocephalic, atraumatic  Eyes: Conjunctivae and lids unremarkable, sclera white  ENT: No pallor or cyanosis  Respiratory: Normal breath sounds, clear to auscultation  Cardiac: Regular rate and rhythm, S1-S2 normal.  No murmurs gallops or rubs. No pedal edema.   Extremities and musculoskeletal: No gross motor deficit  Neurological.  Affect normal  Psych: Alert and oriented x3    Recent Lab Results:  LIPID RESULTS:  Lab Results   Component Value Date    CHOL 132 09/25/2019    HDL 44 (L) 09/25/2019    LDL 73 09/25/2019    TRIG 76 09/25/2019    CHOLHDLRATIO 2.6 " 03/15/2013       LIVER ENZYME RESULTS:  Lab Results   Component Value Date    AST 16 09/25/2019    ALT 35 09/25/2019       CBC RESULTS:  Lab Results   Component Value Date    WBC 6.0 02/16/2020    RBC 4.29 02/16/2020    HGB 13.3 02/16/2020    HCT 40.1 02/16/2020    MCV 94 02/16/2020    MCH 31.0 02/16/2020    MCHC 33.2 02/16/2020    RDW 13.4 02/16/2020     02/16/2020       BMP RESULTS:  Lab Results   Component Value Date     02/16/2020    POTASSIUM 3.9 02/16/2020    CHLORIDE 105 02/16/2020    CO2 26 02/16/2020    ANIONGAP 5 02/16/2020    GLC 86 02/16/2020    BUN 12 02/16/2020    CR 0.52 02/16/2020    GFRESTIMATED >90 02/16/2020    GFRESTBLACK >90 02/16/2020    SONIA 8.9 02/16/2020        A1C RESULTS:  Lab Results   Component Value Date    A1C 5.4 05/11/2016       INR RESULTS:  Lab Results   Component Value Date    INR 0.95 05/10/2016    INR 1.02 04/21/2016       CC  No referring provider defined for this encounter.    All medical records were reviewed in detail and discussed with the patient. Greater than 45 mins were spent with the patient, 50% of this time was spent on counseling and coordination of care.  After visit summary was printed and given to the patient.    Thank you for allowing me to participate in the care of your patient.    Sincerely,     Hayden Mason MD     [facility]

## 2020-02-21 NOTE — PROGRESS NOTES
HPI and Plan:   Today, I had the pleasure of seeing Shirley Hendricks at [facility]. She is a pleasant 59 year old patient with a with a past medical history of chronic smoking, severe peripheral vascular disease requiring CEA, femoropopliteal bypass, hypertension, hyperlipidemia and coronary disease documented on [facility] in 2009 who presents to the clinic for an initial visit.    Patient is scheduled to undergo colonoscopy and clearance has been requested due to significant cardiac history.  The patient recently underwent nuclear stress test which showed an a small sized infarct in the anterolateral wall with mild lydia-infarct ischemia.  EF of 65%. This was different in the sense that previous nuclear stress test had shown ischemia but no infarct.  Coronary angiogram performed after that nuclear stress test showed mild to moderate, diffuse disease (30-50%) in mid segment of small caliber RCA and mid LAD in addition to total cclusion of 1 mm D1.  Medical management was recommended.  She also underwent a ZIO Patch recently which showed 3 NSVT events, longest 10 beats and 47 SVT runs, longest 20 beats.  No symptoms.  Last echocardiogram from 2016 showed normal LV size and systolic function, no WMA, no valvular disease.  EKG showed lower voltage complex, septal infarct but no ST-T wave changes or Q waves.    Clinically, the patient has been doing mediocre at best.  She says she is able to carry a load of laundry up the stairs without getting short of breath.  However, she in general feels tired and weak.  She has not tried exercising recently and lives a sedentary life.  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  She has smoked for 50 years and has tried to cut down several times.  Currently she smokes half a pack a day and is trying to quit.  She has recently noted that her blood pressure has been on the elevated side to as high as 200s/100s.  She is currently on full  doses of lisinopril and Norvasc and small dose of metoprolol.  She denies angina, chest discomfort, orthopnea, PND, presyncope or syncope.    Assessment and plan-   1.  Coronary artery disease  2.  Peripheral vascular disease  3.  Hypertension  4.  Hyperlipidemia  5.  Smoker  6.  Cardiovascular risk stratification before colonoscopy    Stress test showed small area of infarct in the inferolateral wall along with mild lydia-infarct ischemia.  Based on the ischemia trial I believe it is safe to manage her medically at this time.  In addition she does not report typical angina. She does report some shortness of breath on exertion which appears to be multifactorial and partly due to COPD.  I have discussed with her in great detail about necessity to quit smoking immediately.  She told me that it was easy for me to sit on the other side of the table and lectured her about quitting.  I sympathized with her but told her that given severity of vascular disease she has it is more or less now on ever.  She will go back on nicotine patch and try her best to give up smoking.  In the meantime I will switch her from low-dose metoprolol to Coreg which I think will be able to control her blood pressure better.  She already has an appointment to see me back in March and I asked her to keep that appointment.  She will bring her blood pressure diary to their appointment and I will start her on Imdur if blood pressure continues to be higher after switching to Coreg.  Her heart rate has been on a lower/off side and I told her to cut Coreg dose in half if she notices lightheadedness after starting Coreg.    As far as cardiac clearance is concerned the patient is greater than 4 METS as and I do not believe she requires any further stratification before colonoscopy.    Plan  1.  Follow-up with me in 1 month  2.  Take your blood pressure regularly and bring your blood pressure diary to the next visit  3.  Stop metoprolol and start start 6.25  mg of Coreg twice daily.  4.  Quit smoking immediately  5.  Low risk for a low risk procedure (colonoscopy).     aboutThank you for allowing me to participate in the care of Shirley Hendricks    This note was completed in part using Dragon voice recognition software. Although reviewed after completion, some word and grammatical errors may occur.    Hayden Mason MD  Cardiology      CURRENT MEDICATIONS:  Current Outpatient Medications   Medication Sig Dispense Refill     amLODIPine 10 MG PO tablet Take 0.5 tablets (5 mg) by mouth 2 times daily 90 tablet 3     ASPIRIN EC PO Take 81 mg by mouth daily       atorvastatin (LIPITOR) 80 MG tablet TAKE ONE TABLET (80MG) BY MOUTH EVERY EVENING INDICATION:TO LOWER CHOLESTEROL AND TO HELP REDUCE RISK OF REOCURRENCE OF HEAR ATTACK STROKE,A 90 tablet 3     CALCIUM 600-D 600-400 MG-UNIT TABS Take 1 tablet by mouth 2 times daily 180 tablet 2     clopidogrel (PLAVIX) 75 MG tablet Take 1 tablet (75 mg) by mouth daily 90 tablet 3     denosumab (PROLIA) 60 MG/ML SOLN injection Inject 1 mL (60 mg) Subcutaneous every 6 months INDICATION: TO TREAT OSTEOPOROSIS 1 Syringe 0     fluticasone-vilanterol (BREO ELLIPTA) 200-25 MCG/INH inhaler Inhale 1 puff into the lungs daily INDICATION: COPD CONTROLLER 3 Inhaler 3     lisinopril (PRINIVIL/ZESTRIL) 20 MG tablet Take 1 tablet (20 mg) by mouth 2 times daily 180 tablet 3     metoprolol succinate ER (TOPROL-XL) 25 MG 24 hr tablet Take 1 tablet (25 mg) by mouth daily 90 tablet 3     omeprazole (PRILOSEC) 20 MG DR capsule 1 capsule daily as needed 90 capsule 1     Acetaminophen (TYLENOL PO) Take 1,000-1,500 mg by mouth every 6 hours as needed for mild pain or fever        nicotine 14 MG/24HR TD 24 hr patch Place 1 patch onto the skin every 24 hours 30 patch 1     nitroGLYcerin (NITROSTAT) 0.4 MG sublingual tablet Place 1 tablet (0.4 mg) under the tongue See Admin Instructions for chest pain 30 tablet 11       ALLERGIES     Allergies   Allergen  Reactions     Contrast Dye Anaphylaxis     RASH, FACIAL AND NECK SWELLING, SOB, WHEEZING     Pantoprazole      Protonix caused diffuse edema     Chantix [Varenicline]      Terrible dreams     Penicillins Itching       PAST MEDICAL HISTORY:  Past Medical History:   Diagnosis Date     Anxiety 12/7/2017     COPD (chronic obstructive pulmonary disease) (H)      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 clot     Post-menopausal      Reflux esophagitis 2/04    EGD: esophagitis and medium HH     Uncomplicated asthma      Vitamin C deficiency 8/12/2018       PAST SURGICAL HISTORY:  Past Surgical History:   Procedure Laterality Date     ANGIOGRAM       C FABRIC WRAPPING OF ABDOMINAL ANEURYSM       C NONSPECIFIC PROCEDURE  12/00    angioplasty with stent right fem. a.     C NONSPECIFIC PROCEDURE  1987    sinus surgery     C NONSPECIFIC PROCEDURE  9/2/2009    Emergent left groin exploration with oversewing of bleeding angiographic site. 2. Endarterectomy of common femoral-proximal superficial femoral artery with greater saphenous vein patch angioplasty.   a. [place] of accessory right greater saphenous vein.      C NONSPECIFIC PROCEDURE  8/27/2009    occluded left common iliac and external iliac arteries were successfully revascularized with stenting to 8 and 7 mm      CARDIAC CATHERIZATION  9/3/2009    multivessel CAD without target  lesions, med mgmt indicated, preserved ef     ENDARTERECTOMY CAROTID Right 5/11/2016    Procedure: ENDARTERECTOMY CAROTID;  Surgeon: Chadwick Lozano MD;  Location:  OR     GYN SURGERY  left tube    left salpingectomy     LAPAROSCOPIC CHOLECYSTECTOMY N/A 9/27/2017    Procedure: LAPAROSCOPIC CHOLECYSTECTOMY;  LAPAROSCOPIC CHOLECYSTECTOMY;  Surgeon: Jacoby Tapia MD;  Location: [facility]     ORTHOPEDIC SURGERY      left knee surgery     VASCULAR SURGERY  aoto bi fem  left fem-AK pop       FAMILY HISTORY:  Family History   Problem Relation Age of Onset     Cancer Mother         bladder     Cardiovascular Father         alive,multiple heart attacks     Diabetes Maternal Grandmother      Lung Cancer Maternal Grandmother      Blood Disease Brother         clotting disorder       SOCIAL HISTORY:  Social History     Socioeconomic History     Marital status:      Spouse name: None     Number of children: None     Years of education: None     Highest education level: None   Occupational History     None   Social Needs     Financial resource strain: None     Food insecurity:     Worry: None     Inability: None     Transportation needs:     Medical: None     Non-medical: None   Tobacco Use     Smoking status: Current Every Day Smoker     Packs/day: 0.25     Years: 40.00     Pack years: 10.00     Types: Cigarettes     Start date: 1958     Smokeless tobacco: Never Used     Tobacco comment: 1/2 PPD   Substance and Sexual Activity     Alcohol use: Yes     Alcohol/week: 0.0 standard drinks     Comment: x1-2 yr     Drug use: No     Sexual activity: Yes     Partners: Male     Birth control/protection: Surgical   Lifestyle     Physical activity:     Days per week: None     Minutes per session: None     Stress: None   Relationships     Social connections:     Talks on phone: None     Gets together: None     Attends Confucianism service: None     Active member of club or organization: None     Attends meetings of  "clubs or organizations: None     Relationship status: None     Intimate partner violence:     Fear of current or ex partner: None     Emotionally abused: None     Physically abused: None     Forced sexual activity: None   Other Topics Concern     Parent/sibling w/ CABG, MI or angioplasty before 65F 55M? Not Asked   Social History Narrative     None       Review of Systems:  Skin:  Negative       Eyes:  Positive for glasses glasses only as needed  ENT:  Negative      Respiratory:  Positive for dyspnea on exertion     Cardiovascular:  Negative for;edema lightheadedness;dizziness;Positive for;chest pain;palpitations;fatigue    Gastroenterology: Negative      Genitourinary:  Negative      Musculoskeletal:  Positive for neck pain    Neurologic:  Positive for headaches;migraine headaches dull headaches , some visual disturbances  Psychiatric:  Positive for excessive stress caregiver for   Heme/Lymph/Imm:         Endocrine:  Negative        Physical Exam:  Vitals: BP (!) 163/79   Pulse 56   Ht 1.575 m (5' 2.01\")   Wt 58.5 kg (129 lb)   BMI 23.59 kg/m    Constitutional: awake, alert, no distress  Skin: Warm and dry to touch  Head: Normocephalic, atraumatic  Eyes: Conjunctivae and lids unremarkable, sclera white  ENT: No pallor or cyanosis  Respiratory: Normal breath sounds, clear to auscultation  Cardiac: Regular rate and rhythm, S1-S2 normal.  No murmurs gallops or rubs. No pedal edema.   Extremities and musculoskeletal: No gross motor deficit  Neurological.  Affect normal  Psych: Alert and oriented x3    Recent Lab Results:  LIPID RESULTS:  Lab Results   Component Value Date    CHOL 132 09/25/2019    HDL 44 (L) 09/25/2019    LDL 73 09/25/2019    TRIG 76 09/25/2019    CHOLHDLRATIO 2.6 03/15/2013       LIVER ENZYME RESULTS:  Lab Results   Component Value Date    AST 16 09/25/2019    ALT 35 09/25/2019       CBC RESULTS:  Lab Results   Component Value Date    WBC 6.0 02/16/2020    RBC 4.29 02/16/2020    HGB 13.3 " 02/16/2020    HCT 40.1 02/16/2020    MCV 94 02/16/2020    MCH 31.0 02/16/2020    MCHC 33.2 02/16/2020    RDW 13.4 02/16/2020     02/16/2020       BMP RESULTS:  Lab Results   Component Value Date     02/16/2020    POTASSIUM 3.9 02/16/2020    CHLORIDE 105 02/16/2020    CO2 26 02/16/2020    ANIONGAP 5 02/16/2020    GLC 86 02/16/2020    BUN 12 02/16/2020    CR 0.52 02/16/2020    GFRESTIMATED >90 02/16/2020    GFRESTBLACK >90 02/16/2020    SONIA 8.9 02/16/2020        A1C RESULTS:  Lab Results   Component Value Date    A1C 5.4 05/11/2016       INR RESULTS:  Lab Results   Component Value Date    INR 0.95 05/10/2016    INR 1.02 04/21/2016       CC  No referring provider defined for this encounter.    All medical records were reviewed in detail and discussed with the patient. Greater than 45 mins were spent with the patient, 50% of this time was spent on counseling and coordination of care.  After visit summary was printed and given to the patient.

## 2020-02-21 NOTE — PROGRESS NOTES
Subjective     Shirley Hendricks is a 59 year old female who presents to clinic today for the following health issues:    HPI   ED/UC Followup:    Facility:  [facility] ED  Date of visit: 02/16/2020  Reason for visit:  HTN  Current Status: Stable, but c/o dizziness      Pt's past medical history, family history, habits, medications and allergies were reviewed with the patient today.  See snap shot for  HCM status. Most recent lab results reviewed with pt. Problem list and histories reviewed & adjusted, as indicated.  Additional history as below:    Patient recently seen in the emergency room for some lightheadedness and elevated blood pressure.  Note reviewed.  Patient was having blood pressures rise up to 180-190 systolically at night and still in the 160s in the morning.  I spoke with the patient 2 days ago and the patient was started on amlodipine 5 mg daily while continuing lisinopril.  Patient has pretty been on hydrochlorothiazide but had episodes of hyponatremia at that time though she did also have some GI symptoms at that time that could have contributed to the sodium drop. Hx Ziopatch in December showing 3 episodes of V. tach with the longest being 10 beats and 47 episodes of SVT with longest being 20 beats.  Patient occasionally feels palpitations.  Not having chest pain or shortness of breath with them.  Rare lightheadedness.  Patient underwent a stress test of her heart yesterday showing the following.  Did not have chest pain with test:      The nuclear stress test is abnormal.     There is nontransmural infarction in the mid to distal anterolateral segment(s) of the left ventricle associated with a mild-moderate degree of lydia-infarct ischemia.     Left ventricular function is normal.     The left ventricular ejection fraction at stress is 65%.     There is no prior study for comparison.       Note above states that there was no comparison available.  However patient did undergo similar stress test  of the heart in 2009 which showed some anterolateral and apical to basal ischemia present.  She then underwent a heart cath 9/3/09 as below that was medical managed:    Right coronary artery:  This is a diffusely small vessel but is a   dominant artery with mild to moderate diffuse disease of 30-50%,   particularly throughout the mid segment.  PD and PL have mild   irregularities.       Left main:  The left main has no significant disease.  It bifurcates   into left anterior descending and left circumflex coronary arteries.       Left anterior descending:  It gives off a very high small first   diagonal branch of approximately 1 mm in caliber, which is occluded   in the mid segment with left-to-left collaterals to a thread like and   probably diffusely diseased distal diagonal artery.  The second   diagonal is the main branch off the LAD and is widely patent as is   the distal LAD. The LAD has diffuse mild irregularities with a mid   segment narrowing of probably 30-40%.       Left circumflex coronary artery:  This is again a small vessel.  It   gives off its primary branch being OM1 which has mild irregularities   of 20-30% and at a posterolateral branch there is a 50% stenosis of a   small again 1 mm vessel.       CONCLUSION:   1.  Preserved left ventricular function.   2.  Occlusion of a small first diagonal branch.   3.  Diffusely small coronary arteries with moderate diffuse disease.    Recommend aggressive medical management.       Patient continues to smoke 1/2 pack cigarettes per day but is interested in trying to quit.  Chantix gave her severe nightmares previously.  Willing to try nicotine patches again.  Has had difficulty with staying motivated in the past when using the patches.   also smokes.  Patient has a history of colon polyps and is due for a colonoscopy again next month for 1 year recheck after  Having polyps seen again on last colonoscopy.  Denies any blood in her stools currently.   "Patient chronically on aspirin and Plavix with heart history and PAD hx and carotid stenosis. Denies claudication symptoms with ambulation currently.     Additional ROS:   Constitutional, HEENT, Cardiovascular, Pulmonary, GI and , Neuro, MSK and Psych review of systems/symptoms are otherwise negative or unchanged from previous, except as noted above.      OBJECTIVE:  /78   Pulse 57   Temp 98.1  F (36.7  C) (Temporal)   Resp 16   Wt 59 kg (130 lb)   SpO2 98%   BMI 23.78 kg/m     Estimated body mass index is 23.78 kg/m  as calculated from the following:    Height as of 2/19/20: 1.575 m (5' 2\").    Weight as of this encounter: 59 kg (130 lb).      Neck: no adenopathy. Thyroid normal to palpation.  Faint left carotic bruit (old). Right CEA scar  Pulm: Lungs clear to auscultation   CV: Regular rates and rhythm slightly expiratory phase.  Breathing comfortably.  No current wheeze or rhonchi  GI: Soft, nontender, Normal active bowel sounds, No hepatosplenomegaly or masses palpable  Ext: Peripheral pulses palpable distal BLE No edema.  Neuro: Normal strength and tone, sensory exam grossly normal     Assessment/Plan: (See plan discussion below for further details)  1. Essential hypertension   Would like to see tighter control with vascular hx. Will increase Amlodipine to 5mg BID. Has had low sodium issues with hydrochlorathiazide before. Already on BB with  HR 57  - amLODIPine 10 MG PO tablet; Take 0.5 tablets (5 mg) by mouth 2 times daily  Dispense: 90 tablet; Refill: 3    2. Tobacco use disorder   Pt willing to try nicotine patches again. If helping, will then titrate downward  - nicotine 14 MG/24HR TD 24 hr patch; Place 1 patch onto the skin every 24 hours  Dispense: 30 patch; Refill: 1    3. Coronary artery disease involving native coronary artery of native heart without angina pectoris   recent stress test showing infarct  And some lydia-infarct ischemia where previous had just shown ischemia in same " territory. Rare VT seen on  Ziopatch. Not having chest pain with exercise stress test. Possible need for repeat cath though if done and stent used, then would push back colonoscopy timing and pt hs strong hx of polyp growth so would like to have procedure done first if cardiology feels OK. WIll have pt see cardiology today to discuss further. Risk factor management with improved BP and smoking cessation as above and lipids recheck as below    4. SVT (supraventricular tachycardia) (H)   Occ lightheadedness but did not correlate to episodes of SVT when did Ziopatch. On BB. Seeing cardiology today    5. Hyperlipidemia LDL goal <70   LDL 73 with last lab. On max dose Lipitor. Will recheck in next week. If still > 70, will change to Crestor 40mg daily vs addition of Zetia  - Lipid panel reflex to direct LDL Fasting; Future    6. History of colonic polyps   Due for colonoscopy next month. See #3 above re: issue of timing of procedure. Currently assymptomatic    Plan discussion:    Increase Amlodipine to 5mg tab, 1 tab twice a day for blood pressure until run out. Then change to 10mg tab, 1/2  tab twice a day. Prescription on file  Cardiology consult today re:  VT, SVT, abnormal stress test and , if cath considered, timing of the procedure in regards to getting colonoscpy done also first if cardiology feels pt safe to procedure with colonoscopy first    Email me re: blood pressure at night and AM next Tuesday   Nicoderm 14mg daily patch 2-4 weeks. If doing OK, then future lowering to 7mg daily  Fasting lab test next week for lipids.        Vasquez Benoit MD  Internal Medicine Department  [facility]    (Chart documentation was completed, in part, with [employer] voice-recognition software. Even though reviewed, some grammatical, spelling, and word errors may remain.)

## 2020-02-21 NOTE — PATIENT INSTRUCTIONS
Increase Amlodipine to 5mg tab, 1 tab twice a day for blood pressure until run out. Then change to 10mg tab, 1/2  tab twice a day. Prescription on file  Cardiology consult today re:  VT, SVT, abnormal stress test and , if cath considered, timing of the procedure in regards to getting colonoscpy done also first if cardiology feels pt safe to procedure with colonoscopy first    Email me re: blood pressure at night and AM next Tuesday   Nicoderm 14mg daily patch 2-4 weeks. If doing OK, then future lowering to 7mg daily  Fasting lab test next week for lipids.

## 2020-02-26 ENCOUNTER — APPOINTMENT (OUTPATIENT)
Dept: CT IMAGING | Facility: CLINIC | Age: 60
End: 2020-02-26
Attending: EMERGENCY MEDICINE
Payer: COMMERCIAL

## 2020-02-26 ENCOUNTER — HOSPITAL ENCOUNTER (EMERGENCY)
Facility: CLINIC | Age: 60
Discharge: HOME | End: 2020-02-26
Attending: EMERGENCY MEDICINE | Admitting: EMERGENCY MEDICINE
Payer: COMMERCIAL

## 2020-02-26 VITALS
HEIGHT: 62 IN | BODY MASS INDEX: 23.92 KG/M2 | WEIGHT: 130 LBS | DIASTOLIC BLOOD PRESSURE: 71 MMHG | HEART RATE: 62 BPM | SYSTOLIC BLOOD PRESSURE: 170 MMHG | RESPIRATION RATE: 16 BRPM | TEMPERATURE: 96.9 F | OXYGEN SATURATION: 99 %

## 2020-02-26 DIAGNOSIS — S01.01XA LACERATION OF SCALP, INITIAL ENCOUNTER: ICD-10-CM

## 2020-02-26 DIAGNOSIS — S16.1XXA STRAIN OF NECK MUSCLE, INITIAL ENCOUNTER: ICD-10-CM

## 2020-02-26 DIAGNOSIS — S09.90XA CLOSED HEAD INJURY, INITIAL ENCOUNTER: ICD-10-CM

## 2020-02-26 DIAGNOSIS — S00.03XA HEMATOMA OF SCALP, INITIAL ENCOUNTER: ICD-10-CM

## 2020-02-26 DIAGNOSIS — S39.012A BACK STRAIN, INITIAL ENCOUNTER: ICD-10-CM

## 2020-02-26 PROCEDURE — 72128 CT CHEST SPINE W/O DYE: CPT

## 2020-02-26 PROCEDURE — 96374 THER/PROPH/DIAG INJ IV PUSH: CPT

## 2020-02-26 PROCEDURE — 72131 CT LUMBAR SPINE W/O DYE: CPT

## 2020-02-26 PROCEDURE — 99285 EMERGENCY DEPT VISIT HI MDM: CPT | Mod: 25

## 2020-02-26 PROCEDURE — 72125 CT NECK SPINE W/O DYE: CPT

## 2020-02-26 PROCEDURE — 70450 CT HEAD/BRAIN W/O DYE: CPT

## 2020-02-26 PROCEDURE — 25000128 H RX IP 250 OP 636: Performed by: EMERGENCY MEDICINE

## 2020-02-26 RX ORDER — CYCLOBENZAPRINE HCL 10 MG
5-10 TABLET ORAL 3 TIMES DAILY PRN
Qty: 20 TABLET | Refills: 0 | Status: SHIPPED | OUTPATIENT
Start: 2020-02-26 | End: 2020-03-02

## 2020-02-26 RX ORDER — HYDROMORPHONE HYDROCHLORIDE 1 MG/ML
0.5 INJECTION, SOLUTION INTRAMUSCULAR; INTRAVENOUS; SUBCUTANEOUS
Status: DISCONTINUED | OUTPATIENT
Start: 2020-02-26 | End: 2020-02-26 | Stop reason: HOSPADM

## 2020-02-26 RX ORDER — CYCLOBENZAPRINE HCL 10 MG
5-10 TABLET ORAL 3 TIMES DAILY PRN
Qty: 20 TABLET | Refills: 0 | Status: SHIPPED | OUTPATIENT
Start: 2020-02-26 | End: 2020-02-26

## 2020-02-26 RX ADMIN — HYDROMORPHONE HYDROCHLORIDE 0.5 MG: 1 INJECTION, SOLUTION INTRAMUSCULAR; INTRAVENOUS; SUBCUTANEOUS at 08:28

## 2020-02-26 ASSESSMENT — ENCOUNTER SYMPTOMS
WOUND: 1
BACK PAIN: 1
NECK PAIN: 1
HEADACHES: 1

## 2020-02-26 ASSESSMENT — MIFFLIN-ST. JEOR: SCORE: 1114.28

## 2020-02-26 NOTE — ED PROVIDER NOTES
History     Chief Complaint:  Fall    HPI   Shirley Hendricks is a 59 year old female, anticoagulated on Plavix secondary to vascular disease, with a history of chronic pain, HTN, and HLD who presents to the emergency department for evaluation after a fall. The patient reports she was taking the recycling out at 0530 this morning and slipped on ice, causing her to fall backwards and hit the back of her head on the ground. She indicates she did lose consciousness but was eventually able to get up and walk after the fall. She reports she has a laceration to the occipital region of her scalp that is still bleeding. She further reports upper back and neck pain secondary to the fall, prompting her to present to the ED. On exam in the ED, the patient endorses having a headache that feels like pressure in her head.       Allergies:  Contrast Dye  Pantoprazole  Chantix   Penicillins      Medications:    Amlodipine   Aspirin 81 mg  Lipitor  Carvedilol   Plavix  Prolia  Breo Ellipta   Lisinopril  Nicotine patch   Nitrostat   Prilosec      Past Medical History:    Anxiety  COPD  Embolism and thrombosis   GERD  HLD  HTN  Lupus  MMD  MI  OA  PAD  PVD  Neuropathy   Osteoporosis  CAD  DDD  Chronic pain  Stroke  Bilateral carotid artery stenosis   Chronic allergic rhinitis   Tobacco use disorder     Past Surgical History:    Angiogram  Fabric wrapping of abdominal aneurysm   Angioplasty with stent placement in right femoral artery   Sinus surgery  Emergent left groin exploration with over sewing of bleeding angiographic site, endarterectomy of common femoral-proximal superficial femoral artery with greater saphenous vein patch angioplasty, harvest of accessory right greater saphenous vein   Stent placement in iliac arteries   Cardiac catheretization   Endarterectomy carotid   Left salpingectomy   Laparoscopic cholecystectomy   Knee surgery, left   Vascular surgery     Family History:    Bladder cancer   Cardiovascular disease  "  Clotting disorder    Social History:  Presents with female .  Current every day smoker, 0.25 ppd, 10 pack years.   Positive for alcohol use.    Negative for drug use.   Marital Status:   [2]     Review of Systems   Musculoskeletal: Positive for back pain and neck pain.   Skin: Positive for wound.   Neurological: Positive for syncope and headaches.   All other systems reviewed and are negative.    Physical Exam     Patient Vitals for the past 24 hrs:   BP Temp Temp src Pulse Resp SpO2 Height Weight   02/26/20 1029 (!) 170/71 -- -- 62 16 99 % -- --   02/26/20 0752 (!) 183/73 96.9  F (36.1  C) Temporal 60 16 99 % 1.585 m (5' 2.4\") 59 kg (130 lb)      Physical Exam  HENT: scalp atraumatic. No midface instability. oropharynx clear without loose teeth.  1 cm well approximated laceration over posterior scalp hematoma.  EYES: extraocular movements intact, conjunctiva clear  NECK: C-collar in place. Low paraspinal/midline C spine ttp without stepoffs  CV: Rate as noted, regular rhythm.  RESP: Effort normal. Symmetric chest rise, Breath sounds are present bilaterally  CHEST: No chest wall tenderness with AP and lateral compression  GI: Abdomen not tender, not distended  PELVIS: stable  NEURO:   Motor 6=Obeys commands   Verbal 5=Oriented   Eye Opening 4=Spontaneous   GCS Total: 15   cranial nerves II through XII are intact, 5 out of 5 strength in all 4 extremities, sensation is intact light touch in all 4 extremities  EXTREMITIES: No deformity of the extremities  BACK: No midline T/L spine ttp nor step-offs  SKIN: 1 cm well approximated laceration over posterior scalp hematoma.    Emergency Department Course     Imaging:  Radiographic findings were communicated with the patient who voiced understanding of the findings.    Head CT w/o Contrast:  1. No evidence of acute intracranial hemorrhage, mass, or herniation.  2. Large posterior scalp hematoma and soft tissue swelling without  underlying fracture.  3. " Chronic encephalomalacia in the anterior inferior frontal lobes.  As per radiology.    Cervical Spine CT w/o Contrast:  1. No evidence of acute fracture or subluxation in the cervical spine.  2. Degenerative changes in the cervical spine as described above.   As per radiology.    CT Thoracic Spine w/o Contrast:  No evidence of acute fracture or subluxation in the  thoracic spine.  As per radiology.    Lumbar Spine CT w/o Contrast:  No acute fracture or subluxation in the lumbar spine.   As per radiology.    Interventions:  0828 Dilaudid 0.5 mg IV    Emergency Department Course:  Past medical records, nursing notes, and vitals reviewed.    0805 I performed an exam of the patient as documented above.     Medication administered as noted above.     The patient was sent for a Head CT, Cervical Spine CT, Thoracic Spine CT, and Lumbar Spine CT while in the emergency department, results above.     0925 I rechecked the patient and discussed the results of her workup thus far.     Findings and plan explained to the Patient and family. Patient discharged home with instructions regarding supportive care, medications, and reasons to return. The importance of close follow-up was reviewed. The patient was prescribed Flexeril.     Impression & Plan      Medical Decision Making:  Patient presents to the ER for evaluation of injury sustained after fall.  On arrival vital signs are within normal limits.  On exam she has no neuro deficits, she has a occipital hematoma with well approximated scalp laceration that is about 1 cm.  She has tenderness along the spine and paraspinal muscles.  She was placed in a c-collar.  CT imaging of the head, cervical spine, thoracic spine, lumbar spine was negative for acute intracranial hemorrhage or skull/vertebral fracture.  After pain medications, she was feeling improved.  As laceration was well approximated, no suturing or stapling was performed.  She was discharged with Flexeril and encouraged  to use Tylenol.  Plan for recheck in the primary care setting in the coming days.  Close return precautions were discussed prior to discharge.    Diagnosis:    ICD-10-CM    1. Closed head injury, initial encounter S09.90XA    2. Hematoma of scalp, initial encounter S00.03XA    3. Laceration of scalp, initial encounter S01.01XA    4. Strain of neck muscle, initial encounter S16.1XXA    5. Back strain, initial encounter S39.012A      Disposition:  discharged to home    Discharge Medications:  Current Discharge Medication List      START taking these medications    Details   cyclobenzaprine (FLEXERIL) 10 MG tablet Take 0.5-1 tablets (5-10 mg) by mouth 3 times daily as needed for muscle spasms  Qty: 20 tablet, Refills: 0           Scribe Disclosure:   I, Angelic Blanca, am serving as a scribe on 2/26/2020 at 8:05 AM to personally document services performed by Alcides Cartagena MD based on my observations and the provider's statements to me.      Angelic Blanca  2/26/2020    EMERGENCY DEPARTMENT       Alcides Cartagena MD  02/26/20 [id number]

## 2020-02-26 NOTE — LETTER
February 26, 2020      To Whom It May Concern:      Shirley Hendricks was seen in our Emergency Department today, 02/26/20. Please excuse her from work through 2/28/20 due to medical concern.    Sincerely,        Alcides Cartagena MD

## 2020-02-26 NOTE — DISCHARGE INSTRUCTIONS
Discharge Instructions  Head Injury    You have been seen today for a head injury. Your evaluation included a history and physical examination. You may have had a CT (CAT) scan performed, though most head injuries do not require a scan. Based on this evaluation, your provider today does not feel that your head injury is serious.    Generally, every Emergency Department visit should have a follow-up clinic visit with either a primary or a specialty clinic/provider. Please follow-up as instructed by your emergency provider today.  Return to the Emergency Department if:  You are confused or you are not acting right.  Your headache gets worse or you start to have a really bad headache even with your recommended treatment plan.  You vomit (throw up) more than once.  You have a seizure.  You have trouble walking.  You have weakness or paralysis (cannot move) in an arm or a leg.  You have blood or fluid coming from your ears or nose.  You have new symptoms or anything that worries you.    Sleeping:  It is okay for you to sleep, but someone should wake you up if instructed by your provider, and someone should check on you at your usual time to wake up.     Activity:  Do not drive for at least 24 hours.  Do not drive if you have dizzy spells or trouble concentrating, or remembering things.  Do not return to any contact sports until cleared by your regular provider.     MORE INFORMATION:    Concussion:  A concussion is a minor head injury that may cause temporary problems with the way the brain works. Although concussions are important, they are generally not an emergency or a reason that a person needs to be hospitalized. Some concussion symptoms include confusion, amnesia (forgetful), nausea (sick to your stomach) and vomiting (throwing up), dizziness, fatigue, memory or concentration problems, irritability and sleep problems. For most people, concussions are mild and temporary but some will have more severe and persistent  symptoms that require on-going care and treatment.  CT Scans: Your evaluation today may have included a CT scan (CAT scan) to look for things like bleeding or a skull fracture (broken bone).  CT scans involve radiation and too many CT scans can cause serious health problems like cancer, especially in children.  Because of this, your provider may not have ordered a CT scan today if they think you are at low risk for a serious or life threatening problem.    If you were given a prescription for medicine here today, be sure to read all of the information (including the package insert) that comes with your prescription.  This will include important information about the medicine, its side effects, and any warnings that you need to know about.  The pharmacist who fills the prescription can provide more information and answer questions you may have about the medicine.  If you have questions or concerns that the pharmacist cannot address, please call or return to the Emergency Department.     Remember that you can always come back to the Emergency Department if you are not able to see your regular provider in the amount of time listed above, if you get any new symptoms, or if there is anything that worries you.    Discharge Instructions  Laceration (Cut)    You were seen today for a laceration (cut).  Your provider examined your laceration for any problems such a buried foreign body (like glass, a splinter, or gravel), or injury to blood vessels, tendons, and nerves.  Your provider may have also rinsed and/or scrubbed your laceration to help prevent an infection. It may not be possible to find all problems with your laceration on the first visit; occasionally foreign bodies or a tendon injury can go undetected.    Your laceration may have been closed in one of several ways:  No closure: many wounds will heal just fine without closure.  Stitches: regular stitches that require removal.  Staples: skin staples are often used  in the scalp/head.  Wound adhesive (glue): skin glue can be used for certain lacerations and doesn t require removal.  Wound strips (aka Butterfly bandages or steri-strips): these are bandages that help to close a wound.  Absorbable stitches:  dissolving  stitches that go away on their own and usually don t require removal.    A small percentage of wounds will develop an infection regardless of how well the wound is cared for. Antibiotics are generally not indicated to prevent an infection so are only given for a small number of high-risk wounds. Some lacerations are too high risk to close, and are left open to heal because closure can increase the likelihood that an infection will develop.    Remember that all lacerations, no matter how expertly repaired, will cause scarring. We consider many factors, techniques, and materials, in our efforts to provide the best possible cosmetic outcome.    Generally, every Emergency Department visit should have a follow-up clinic visit with either a primary or a specialty clinic/provider. Please follow-up as instructed by your emergency provider today.     Return to the Emergency Department right away if:  You have more redness, swelling, pain, drainage (pus), a bad smell, or red streaking from your laceration as these symptoms could indicate an infection.  You have a fever of 100.4 F or more.  You have bleeding that you cannot stop at home. If your cut starts to bleed, hold pressure on the bleeding area with a clean cloth or put pressure over the bandage.  If the bleeding does not stop after using constant pressure for 30 minutes, you should return to the Emergency Department for further treatment.  An area past the laceration is cool, pale, or blue compared with the other side, or has a slower return of color when squeezed.  Your dressing seems too tight or starts to get uncomfortable or painful. For children, signs of a problem might be irritability or restlessness.  You have  loss of normal function or use of an area, such as being unable to straighten or bend a finger normally.  You have a numb area past the laceration.    Return to the Emergency Department or see your regular provider if:  The laceration starts to come open.   You have something coming out of the cut or a feeling that there is something in the laceration.  Your wound will not heal, or keeps breaking open. There can always be glass, wood, dirt or other things in any wound.  They will not always show up, even on x-rays.  If a wound does not heal, this may be why, and it is important to follow-up with your regular provider.    Home Care:  Take your dressing off in 12-24 hours, or as instructed by your provider, to check your laceration. Remove the dressing sooner if it seems too tight or painful, or if it is getting numb, tingly, or pale past the dressing.  Gently wash your laceration 1-2 times daily with clean water and mild soap. It is okay to shower or run clean water over the laceration, but do not let the laceration soak in water (no swimming).  If your laceration was closed with wound adhesive or strips: pat it dry and leave it open to the air. For all other repairs: after you wash your laceration, or at least 2 times a day, apply antibiotic ointment (such as Neosporin  or Bacitracin ) to the laceration, then cover it with a Band-Aid  or gauze.  Keep the laceration clean. Wear gloves or other protective clothing if you are around dirt.    Follow-up for removal:  If your wound was closed with staples or regular stitches, they need to be removed according to the instructions and timeline specified by your provider today.  If your wound was closed with absorbable ( dissolving ) sutures, they should fall out, dissolve, or not be visible in about one week. If they are still visible, then they should be removed according to the instructions and timeline specified by your provider today.    Scars:  To help minimize  scarring:  Wear sunscreen over the healed laceration when out in the sun.  Massage the area regularly once healed.  You may apply Vitamin E to the healed wound.  Wait. Scars improve in appearance over months and years.    If you were given a prescription for medicine here today, be sure to read all of the information (including the package insert) that comes with your prescription.  This will include important information about the medicine, its side effects, and any warnings that you need to know about.  The pharmacist who fills the prescription can provide more information and answer questions you may have about the medicine.  If you have questions or concerns that the pharmacist cannot address, please call or return to the Emergency Department.       Remember that you can always come back to the Emergency Department if you are not able to see your regular provider in the amount of time listed above, if you get any new symptoms, or if there is anything that worries you.    Discharge Instructions  Neck Strain    You have been seen today for a neck sprain or strain.  Neck strains usually result from an injury to the neck. Car accidents, contact sports, and falls are common causes of neck strain. Sometimes your neck can start to hurt because of increased activity, muscle tension, an abnormal sleeping position, or because of other problems like arthritis in the neck.     Neck pain usually comes from injured muscles and ligaments. Sometimes there is a herniated ( slipped ) disc. We do not usually do MRI scans to look for these right away, since most herniated discs will get better on their own with time. Today, we did not find any evidence that your neck pain was caused by a serious or dangerous condition. However, sometimes symptoms develop over time and cannot be found during an emergency visit, so it is very important that you follow up with your primary provider.    Generally, every Emergency Department visit  should have a follow-up clinic visit with either a primary or a specialty clinic/provider. Please follow-up as instructed by your emergency provider today.    Return to the Emergency Department if:  You have increasing pain in your neck.  You develop difficulty swallowing or breathing.  You have numbness, weakness, or trouble moving your arms or legs.  You have severe dizziness and difficulty walking.  You are unable to control your bladder or bowels.  You develop severe headache or ringing in the ears.    What can I do to help myself at home?  If you had an injury, use cold for the first 1-2 days. Cold helps relieve pain and reduce inflammation.  Apply ice packs to the neck or areas of pain every 1-2 hours for 20 minutes at a time. Place a towel or cloth between your skin and the ice pack.  After the first 2 days, using heat can help with neck pain and stiffness. You may use a warm shower or bath, warm towels on the neck, or a heating pad. Do not sleep with a heating pad, as you can be burned.   Pain medications - You may take a pain medication such as Tylenol  (acetaminophen), Advil  and Motrin  (ibuprofen), or Aleve  (naproxen).  It is usually best to rest the neck for 1-2 days after an injury, then start gentle stretching exercises.   It is helpful to place a small pillow under the nape of your neck to provide proper neutral positioning.   You should stay active and do your usual work as much as you can, unless this involves heavy physical labor. Ask your provider if you need work restrictions.  If you were given a prescription for medicine here today, be sure to read all of the information (including the package insert) that comes with your prescription.  This will include important information about the medicine, its side effects, and any warnings that you need to know about.  The pharmacist who fills the prescription can provide more information and answer questions you may have about the medicine.  If you  have questions or concerns that the pharmacist cannot address, please call or return to the Emergency Department.   Remember that you can always come back to the Emergency Department if you are not able to see your regular provider in the amount of time listed above, if you get any new symptoms, or if there is anything that worries you.    Discharge Instructions  Back Pain  You were seen today for back pain. Back pain can have many causes, but most will get better without surgery or other specific treatment. Sometimes there is a herniated ( slipped ) disc. We do not usually do MRI scans to look for these right away, since most herniated discs will get better on their own with time.  Today, we did not find any evidence that your back pain was caused by a serious condition. However, sometimes symptoms develop over time and cannot be found during an emergency visit, so it is very important that you follow up with your primary provider.  Generally, every Emergency Department visit should have a follow-up clinic visit with either a primary or a specialty clinic/provider. Please follow-up as instructed by your emergency provider today.    Return to the Emergency Department if:  You develop a fever with your back pain.   You have weakness or change in sensation in one or both legs.  You lose control of your bowels or bladder, or cannot empty your bladder (cannot pee).  Your pain gets much worse.     Follow-up with your provider:  Unless your pain has completely gone away, please make an appointment with your provider within one week. Most of the routine care for back pain is available in a clinic and not the Emergency Department. You may need further management of your back pain, such as more pain medication, imaging such as an X-ray or MRI, or physical therapy.    What can I do to help myself?  Remain Active -- People are often afraid that they will hurt their back further or delay recovery by remaining active, but this  is one of the best things you can do for your back. In fact, staying in bed for a long time to rest is not recommended. Studies have shown that people with low back pain recover faster when they remain active. Movement helps to bring blood flow to the muscles and relieve muscle spasms as well as preventing loss of muscle strength.  Heat -- Using a heating pad can help with low back pain during the first few weeks. Do not sleep with a heating pad, as you can be burned.   Pain medications - You may take a pain medication such as Tylenol  (acetaminophen), Advil , Motrin  (ibuprofen) or Aleve  (naproxen).  If you were given a prescription for medicine here today, be sure to read all of the information (including the package insert) that comes with your prescription.  This will include important information about the medicine, its side effects, and any warnings that you need to know about.  The pharmacist who fills the prescription can provide more information and answer questions you may have about the medicine.  If you have questions or concerns that the pharmacist cannot address, please call or return to the Emergency Department.   Remember that you can always come back to the Emergency Department if you are not able to see your regular provider in the amount of time listed above, if you get any new symptoms, or if there is anything that worries you.

## 2020-02-26 NOTE — ED TRIAGE NOTES
Slipped and fell while taking recyclables out this morning at 0530, lost consciousness, was able to get back up and into house. Pain on buttocks/low back, pain on occipital head and lac.

## 2020-02-26 NOTE — ED AVS SNAPSHOT
Emergency Department  [address] [place] [id number]  Phone:  [phone]  Fax:  [phone]                                    Shirley Hendricks   MRN: [id number]    Department:   Emergency Department   Date of Visit:  2/26/2020           After Visit Summary Signature Page    I have received my discharge instructions, and my questions have been answered. I have discussed any challenges I see with this plan with the nurse or doctor.    ..........................................................................................................................................  Patient/Patient Representative Signature      ..........................................................................................................................................  Patient Representative Print Name and Relationship to Patient    ..................................................               ................................................  Date                                   Time    ..........................................................................................................................................  Reviewed by Signature/Title    ...................................................              ..............................................  Date                                               Time          22EPIC Rev 08/18

## 2020-03-02 ENCOUNTER — ANCILLARY PROCEDURE (OUTPATIENT)
Dept: GENERAL RADIOLOGY | Facility: CLINIC | Age: 60
End: 2020-03-02
Attending: INTERNAL MEDICINE
Payer: COMMERCIAL

## 2020-03-02 ENCOUNTER — OFFICE VISIT (OUTPATIENT)
Dept: INTERNAL MEDICINE | Facility: CLINIC | Age: 60
End: 2020-03-02
Payer: COMMERCIAL

## 2020-03-02 VITALS
RESPIRATION RATE: 16 BRPM | DIASTOLIC BLOOD PRESSURE: 70 MMHG | SYSTOLIC BLOOD PRESSURE: 128 MMHG | BODY MASS INDEX: 22.82 KG/M2 | OXYGEN SATURATION: 100 % | WEIGHT: 126.4 LBS | HEART RATE: 57 BPM
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
## 2020-03-02 DIAGNOSIS — Z92.29 HX OF LONG TERM USE OF BLOOD THINNERS: ICD-10-CM

## 2020-03-02 DIAGNOSIS — K21.00 REFLUX ESOPHAGITIS: ICD-10-CM

## 2020-03-02 DIAGNOSIS — K21.9 GASTROESOPHAGEAL REFLUX DISEASE, ESOPHAGITIS PRESENCE NOT SPECIFIED: ICD-10-CM

## 2020-03-02 DIAGNOSIS — S09.90XD TRAUMATIC INJURY OF HEAD, SUBSEQUENT ENCOUNTER: ICD-10-CM

## 2020-03-02 DIAGNOSIS — M53.3 SACRAL PAIN: ICD-10-CM

## 2020-03-02 PROCEDURE — 99214 OFFICE O/P EST MOD 30 MIN: CPT | Performed by: INTERNAL MEDICINE

## 2020-03-02 PROCEDURE — 72220 X-RAY EXAM SACRUM TAILBONE: CPT

## 2020-03-02 NOTE — PROGRESS NOTES
SUBJECTIVE:                                                      HPI: Shirley Hendricks is a pleasant 59 year old female who presents for ER follow-up:    Patient presented to the ER last week after slipping on ice and falling backwards, hitting her head on the ground. Head, cervical spine, thoracic spine, and lumbar CT negative other than large posterior scalp hematoma and soft tissue swelling. Occipital scalp laceration was well approximated - no suturing or stapling needed.    Patient continues to have occipital pain with palpation/pressure only, otherwise asymptomatic from a head trauma perspective. No headaches or vision changes. No nausea or vomiting. No focal numbness, tingling, or weakness.    Patient complains of pain in her bottom (indicates sacral area). She believes she also hit her bottom when she fell. Pain does not radiate. Pain is worse with position changes and prolonged sitting or standing.  Pain is sharp in quality and severe in severity.  Patient is also only able to take Tylenol for pain relief (on Plavix and aspirin for history of PAD, CAD, and stroke). Heating pads have been helping as well. No lower extremity numbness, tingling, or weakness. No urinary retention or incontinence. No fecal incontinence.    Unrelated to above, patient needs refills of omeprazole.    The medication, allergy, and problem lists have been reviewed and updated as appropriate.     OBJECTIVE:                                                      /70   Pulse 57   Resp 16   Wt 57.3 kg (126 lb 6.4 oz)   SpO2 100%   BMI 22.82 kg/m    Constitutional: well-appearing  Occipital scalp: continued large hematoma and healing laceration  Sacrum/coccyx: no obvious deformity, crepitus, step-off, ecchymoses, or hematoma; very tender to palpation    ASSESSMENT/PLAN:                                                      (W19.XXXD) Fall, subsequent encounter  (primary encounter diagnosis)  (S09.90XD) Traumatic injury of  head, subsequent encounter  (M53.3) Sacral pain  (Z92.29) Hx of long term use of blood thinners  Comment: avoiding NSAIDs in light of patient's Plavix and aspirin use.  Plan:    - sacral/coccyx XRs today.   - if positive, can provide a short course of narcotic pain relief.   - if negative, continue Tylenol, heating packs, and donut cushion for relief.   - letter provided for work.    (K21.9) Gastroesophageal reflux disease, esophagitis presence not specified  Comment: well-controlled with omeprazole.  Plan: CPM; refills provided.    The instructions on the AVS were discussed and explained to the patient. Patient expressed understanding of instructions.    (Chart documentation was completed, in part, with [employer] voice-recognition software. Even though reviewed, some grammatical, spelling, and word errors may remain.)    Lizeth Peterson MD   [address] [address]  T: [phone], F: [phone]

## 2020-03-02 NOTE — LETTER
03/02/20      Shirley Hendricks  1960  [address]  [facility] [address]        To whom it may concern,    Please excuse Ms. Hendricks from work 3/2/2020 - 3/16/2020.  She will be needing time to rest and recuperate from an illness that I am seeing her for. She may return to work on 3/17/2020 without restrictions.    Please contact me with any question or concerns.        Lizeth Peterson MD   [address], [place] [address]  T: [phone], F: [phone]

## 2020-03-03 NOTE — TELEPHONE ENCOUNTER
"Requested Prescriptions   Pending Prescriptions Disp Refills     omeprazole (PRILOSEC) 20 MG DR capsule [Pharmacy Med Name: Omeprazole Oral Capsule Delayed Release 20 MG] 90 capsule 0     Sig: TAKE ONE CAPSULE BY MOUTH DAILY AS NEEDED       PPI Protocol Failed - 3/2/2020  9:17 AM        Failed - Not on Clopidogrel (unless Pantoprazole ordered)        Failed - No diagnosis of osteoporosis on record        Passed - Recent (12 mo) or future (30 days) visit within the authorizing provider's specialty     Patient has had an office visit with the authorizing provider or a provider within the authorizing providers department within the previous 12 mos or has a future within next 30 days. See \"Patient Info\" tab in inbasket, or \"Choose Columns\" in Meds & Orders section of the refill encounter.              Passed - Medication is active on med list        Passed - Patient is age 18 or older        Passed - No active pregnacy on record        Passed - No positive pregnancy test in past 12 months        Last Written Prescription Date:  duplicate   Last Fill Quantity: ,  # refills:    Last Office Visit: 3/2/2020   Future Office Visit:    Next 5 appointments (look out 90 days)    Mar 12, 2020 11:00 AM CDT  Pre-Op physical with Vasquez Benoit MD  [facility] [address] [address] [phone]           "

## 2020-03-05 ENCOUNTER — TELEPHONE (OUTPATIENT)
Dept: OTHER | Facility: CLINIC | Age: 60
End: 2020-03-05

## 2020-03-05 NOTE — TELEPHONE ENCOUNTER
Pt hx of   aortobifemoral bypass graft on 3/17/2010. And left femoral to above-knee popliteal in situ bypass graft, CEA on 5/11/2016.    Pt is having a colonoscopy on 3/23/20, Lor from [facility] [phone] called to inquire if pt may hold Plavix for 5 days prior and if any bridging needed, will need bridging order if so.     Will discuss with Dr. Lozano.     Katherine Pimentel, BSN, RN  [facility]

## 2020-03-05 NOTE — TELEPHONE ENCOUNTER
I discussed with Dr. Lozano, per Dr. Lozano pt okay to hold Plavix for 5 days, no bridging needed.      I called [facility] back and spoke to Izaiah, relayed this information, Izaiah notes understanding.     ESTHER JohnsonN, RN  [facility]

## 2020-03-12 ENCOUNTER — OFFICE VISIT (OUTPATIENT)
Dept: INTERNAL MEDICINE | Facility: CLINIC | Age: 60
End: 2020-03-12
Payer: COMMERCIAL

## 2020-03-12 VITALS
HEART RATE: 56 BPM | BODY MASS INDEX: 22.93 KG/M2 | SYSTOLIC BLOOD PRESSURE: 124 MMHG | TEMPERATURE: 97.9 F | DIASTOLIC BLOOD PRESSURE: 72 MMHG | WEIGHT: 127 LBS | OXYGEN SATURATION: 98 % | RESPIRATION RATE: 16 BRPM

## 2020-03-12 DIAGNOSIS — Z01.818 PREOP GENERAL PHYSICAL EXAM: Primary | ICD-10-CM

## 2020-03-12 PROCEDURE — 99215 OFFICE O/P EST HI 40 MIN: CPT | Performed by: INTERNAL MEDICINE

## 2020-03-12 NOTE — PROGRESS NOTES
[address] [address]  [phone]  Dept: [phone]    PRE-OP EVALUATION:  Today's date: 3/12/2020    Shirley Hendricks (: 1960) presents for pre-operative evaluation assessment as requested by Dr. Carpenter.  She requires evaluation and anesthesia risk assessment prior to undergoing surgery/procedure for follow-up of colon polyps    Proposed Surgery/ Procedure: Colonoscopy  Date of Surgery/ Procedure: 2020  Time of Surgery/ Procedure: 8am  [facility]  Primary Physician: Vasquez Benoit  Type of Anesthesia Anticipated: MAC  Patient has a Health Care Directive or Living Will:  NO    1. YES - DO YOU HAVE A HISTORY OF HEART ATTACK, STROKE, STENT, BYPASS OR SURGERY ON AN ARTERY IN THE HEAD, NECK, HEART OR LEG?  Ant/lat MI many years ago. Also hx PAD. No claudication sx now  2. NO - Do you ever have any pain or discomfort in your chest?  3. NO - Do you have a history of  Heart Failure?   4. NO - Are you troubled by shortness of breath when: walking on the level, up a slight hill or at night?  5. NO - Do you currently have a cold, bronchitis or other respiratory infection?  6. YES - DO YOU HAVE A COUGH, SHORTNESS OF BREATH OR WHEEZING?  Occ NP cough. Smoking 1/2 ppd. No shortness of breath   7. NO - Do you sometimes get pains in the calves of your legs when you walk?  8. YES - DO YOU OR ANYONE IN YOUR FAMILY HAVE PREVIOUS HISTORY OF BLOOD CLOTS? Brother and son have had blood clots  9. NO - Do you or does anyone in your family have a serious bleeding problem such as prolonged bleeding following surgeries or cuts?  10. NO - Have you ever had problems with anemia or been told to take iron pills?  11. NO - Have you had any abnormal blood loss such as black, tarry or bloody stools, or abnormal vaginal bleeding?  12. NO - Have you ever had a blood transfusion?  13. NO - Have you or any of your relatives ever had problems with  "anesthesia?  14. NO - Do you have sleep apnea, excessive snoring or daytime drowsiness?  15. NO - Do you have any prosthetic heart valves?  16. NO - Do you have prosthetic joints?  17. NO - Is there any chance that you may be pregnant?           HPI:     HPI related to upcoming procedure:  Prevoius colonoscopy 1 year ago showed:    - One 15 mm polyp at the ileocecal valve, removed                             with a cold snare. Resected and retrieved. Injected.                             - Two 5 to 8 mm polyps in the ascending colon,                             removed with a cold snare. Resected and retrieved.                             - Five 4 to 7 mm polyps in the rectum and in the                             sigmoid colon, removed with a cold snare. Complete                             resection. Partial retrieval.   Recommendation:           - Await pathology results.                             - Repeat colonoscopy in 1 year for surveillance.     Pt now due for f/u colonoscopy with polyp hx as above. Still smoking 1/2 ppd and not interested in quitting.     Followed by cardiology and being treated medically. Denies recent chest pain with usual walking activity. 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   Saw cardiology 2/21/20. Per that note:  \"Patient is scheduled to undergo colonoscopy and clearance has been requested due to significant cardiac history.  The patient recently underwent nuclear stress test which showed an a small sized infarct in the anterolateral wall with mild lydia-infarct ischemia.  EF of 65%. This was different in the sense that previous nuclear stress test had shown ischemia but no infarct.  Coronary angiogram performed after that nuclear stress test showed mild to moderate, diffuse disease (30-50%) in mid segment of small caliber RCA and mid LAD in addition to total cclusion of 1 mm D1.  Medical " "management was recommended.  She also underwent a ZIO Patch recently which showed 3 NSVT events, longest 10 beats and 47 SVT runs, longest 20 beats.  No symptoms.  Last echocardiogram from 2016 showed normal LV size and systolic function, no WMA, no valvular disease.  EKG showed lower voltage complex, septal infarct but no ST-T wave changes or Q waves.\"    Was started on Coreg for better blood pressure control at that cardiology appt.  BP now well controlled. Denies claudication sx  Denies seeing blood ins stools  Fell on ice late February and seen in ER. Note reviewed and also had f/u with Dr Peterson in clinic afterwards. No headache today. Occ back and neck pain but better. No lightheadedness today.  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       MEDICAL HISTORY:     Patient Active Problem List    Diagnosis Date Noted     Health Care Home 06/06/2011     Priority: High     EMERGENCY CARE PLAN  Presenting Problem Signs and Symptoms Treatment Plan    Questions or conerns during clinic hours    I will call the clinic directly     Questions or conerns outside clinic hours    I will call the 24 hour nurse line at [phone]    Patient needs to schedule an appointment    I will call the 24 hour scheduling team at [phone] or clinic directly    Same day treatment     I will call the clinic first, nurse line if after hours, urgent care and express care if needed                            DX V65.8 REPLACED WITH [id number] HEALTH CARE HOME (04/08/2013)       History of colonic polyps 02/21/2020     Priority: Medium     SVT (supraventricular tachycardia) (H) 02/21/2020     Priority: Medium     Vitamin C deficiency 08/12/2018     Priority: Medium     Anxiety 12/07/2017     Priority: Medium     Mild major depression (H) 11/07/2017     Priority: Medium     Chronic allergic rhinitis due to animal hair and dander 07/20/2017     Priority: Medium     Tobacco use disorder 07/20/2017     Priority: Medium     " Chronic obstructive pulmonary disease, unspecified COPD type (H) 07/20/2017     Priority: Medium     S/P carotid endarterectomy 07/07/2016     Priority: Medium     RIGHT SIDE       Bilateral carotid artery stenosis 04/21/2016     Priority: Medium     Coronary artery disease involving native coronary artery of native heart without angina pectoris 03/03/2016     Priority: Medium     Primary osteoarthritis involving multiple joints 03/03/2016     Priority: Medium     DDD (degenerative disc disease), lumbar 03/03/2016     Priority: Medium     Chronic pain syndrome 03/03/2016     Priority: Medium     Patient is followed by BRISA REGAN for ongoing prescription of pain medication.  All refills should be approved by this provider, or covering partner.    Medication(s): TRAMADOL.   Maximum quantity per month: 120  Clinic visit frequency required: Q 3 months     Controlled substance agreement:  Encounter-Level CSA - 3/3/16:               Controlled Substance Agreement - Scan on 3/3/2016  5:20 PM : CONTROLLED SUBSTANCE AGREEMENT- 3/3/2016 (below)            Pain Clinic evaluation in the past: No - CANNOT BE ON NSAIDs BECAUSE SHE IS ON ANTIPLATELETS    DIRE Total Score(s):  No flowsheet data found.    Last [facility] website verification:  done on 07/01/2016,    [email or web address]       Stroke (H) 03/03/2016     Priority: Medium     Hyperlipidemia LDL goal <70 07/07/2014     Priority: Medium     PAD (peripheral artery disease) (H) 07/07/2014     Priority: Medium     Essential hypertension 07/07/2014     Priority: Medium     Problem list name updated by automated process. 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 clot     Post-menopausal      Reflux esophagitis 2/04    EGD: esophagitis and medium HH     Uncomplicated asthma      Vitamin C deficiency 8/12/2018     Past Surgical History:   Procedure Laterality Date     ANGIOGRAM       C FABRIC WRAPPING OF ABDOMINAL ANEURYSM       C NONSPECIFIC PROCEDURE  12/00    angioplasty with stent right fem. a.     C NONSPECIFIC PROCEDURE  1987    sinus surgery     C NONSPECIFIC PROCEDURE  9/2/2009    Emergent left groin exploration with oversewing of bleeding angiographic site. 2. Endarterectomy of common femoral-proximal superficial femoral artery with greater saphenous vein patch angioplasty.   a. [place] of accessory right greater saphenous vein.      C NONSPECIFIC PROCEDURE  8/27/2009    occluded left common iliac and external iliac arteries were successfully revascularized with stenting to 8 and 7 mm      CARDIAC CATHERIZATION  9/3/2009    multivessel CAD without target lesions, med mgmt indicated, preserved ef     ENDARTERECTOMY CAROTID Right 5/11/2016    Procedure: ENDARTERECTOMY CAROTID;  Surgeon: Chadwick Lozano MD;  Location: [facility] OR     GYN SURGERY  left tube    left salpingectomy     LAPAROSCOPIC CHOLECYSTECTOMY N/A 9/27/2017    Procedure: LAPAROSCOPIC CHOLECYSTECTOMY;  LAPAROSCOPIC CHOLECYSTECTOMY;  Surgeon: Jacoby Tapia MD;   Location: [facility]     ORTHOPEDIC SURGERY      left knee surgery     VASCULAR SURGERY  aoto bi fem  left fem-AK pop     Current Outpatient Medications   Medication Sig Dispense Refill     Acetaminophen (TYLENOL PO) Take 1,000-1,500 mg by mouth every 6 hours as needed for mild pain or fever        amLODIPine 10 MG PO tablet Take 0.5 tablets (5 mg) by mouth 2 times daily 90 tablet 3     ASPIRIN EC PO Take 81 mg by mouth daily       atorvastatin (LIPITOR) 80 MG tablet TAKE ONE TABLET (80MG) BY MOUTH EVERY EVENING INDICATION:TO LOWER CHOLESTEROL AND TO HELP REDUCE RISK OF REOCURRENCE OF HEAR ATTACK STROKE,A 90 tablet 3     CALCIUM 600-D 600-400 MG-UNIT TABS Take 1 tablet by mouth 2 times daily 180 tablet 2     carvedilol 6.25 MG PO tablet Take 1 tablet (6.25 mg) by mouth 2 times daily (with meals) 180 tablet 3     clopidogrel (PLAVIX) 75 MG tablet Take 1 tablet (75 mg) by mouth daily 90 tablet 3     denosumab (PROLIA) 60 MG/ML SOLN injection Inject 1 mL (60 mg) Subcutaneous every 6 months INDICATION: TO TREAT OSTEOPOROSIS 1 Syringe 0     fluticasone-vilanterol (BREO ELLIPTA) 200-25 MCG/INH inhaler Inhale 1 puff into the lungs daily INDICATION: COPD CONTROLLER 3 Inhaler 3     lisinopril (PRINIVIL/ZESTRIL) 20 MG tablet Take 1 tablet (20 mg) by mouth 2 times daily 180 tablet 3     nicotine 14 MG/24HR TD 24 hr patch Place 1 patch onto the skin every 24 hours 30 patch 1     nitroGLYcerin (NITROSTAT) 0.4 MG sublingual tablet Place 1 tablet (0.4 mg) under the tongue See Admin Instructions for chest pain 30 tablet 11     omeprazole (PRILOSEC) 20 MG DR capsule TAKE ONE CAPSULE BY MOUTH DAILY AS NEEDED 90 capsule 0     OTC products: None, except as noted above    Allergies   Allergen Reactions     Contrast Dye Anaphylaxis     RASH, FACIAL AND NECK SWELLING, SOB, WHEEZING     Pantoprazole      Protonix caused diffuse edema     Chantix [Varenicline]      Terrible dreams     Penicillins Itching      Latex Allergy: NO    Social History      Tobacco Use     Smoking status: Current Every Day Smoker     Packs/day: 0.25     Years: 40.00     Pack years: 10.00     Types: Cigarettes     Start date: 1960     Smokeless tobacco: Never Used     Tobacco comment: 1/2 PPD   Substance Use Topics     Alcohol use: Yes     Alcohol/week: 0.0 standard drinks     Comment: x1-2 yr     History   Drug Use No       REVIEW OF SYSTEMS:   CONSTITUTIONAL: NEGATIVE for fever, chills, . Weight down 5 pounds recently after prior weight gain of 5 pounds  INTEGUMENTARY/SKIN: NEGATIVE for worrisome rashes, moles or lesions  EYES: NEGATIVE for vision changes or irritation  ENT/MOUTH: NEGATIVE for ear, mouth and throat problems  RESP:  See HPI. Denies shortness of breath now with Breo inhaler  BREAST: NEGATIVE for masses, tenderness or discharge  CV:  See HPI. Denies peripheral edema  GI: NEGATIVE for nausea, abdominal pain, heartburn, or change in bowel habits. See HPI  : NEGATIVE for frequency, dysuria, or hematuria  MUSCULOSKELETAL:  POSITIVE for neck and low back soreness since fall. Improving.   NEURO: NEGATIVE for weakness,  or paresthesias. NO radicular sx into UE or LE. Rare dizziness since fall. None today  ENDOCRINE: NEGATIVE for temperature intolerance. On statin  HEME: NEGATIVE for bleeding problems  PSYCHIATRIC: NEGATIVE for changes in mood or affect    EXAM:   /72   Pulse 56   Temp 97.9  F (36.6  C) (Temporal)   Resp 16   Wt 57.6 kg (127 lb)   SpO2 98%   BMI 22.93 kg/m    Eye: PERRL, EOMI  HENT: ear canals and TM's normal and nose and mouth without ulcers or lesions   Neck: no adenopathy. Thyroid normal to palpation. No bruits  Pulm: Lungs clear to auscultation   CV: Regular rates and rhythm  GI: Soft, nontender, Normal active bowel sounds, No hepatosplenomegaly or masses palpable  Ext: Peripheral pulses intact. No edema.  Neuro: Normal strength and tone, sensory exam grossly normal  MSK: Mild tenderness to palpation bilateral paracervical and   paralumbar area. Neg SLRT bilaterally.      DIAGNOSTICS:   EKG:  NSR. Old Septal infarct. No acute  ST/T changes. Rate 61    Recent Labs   Lab Test 02/16/20  1824 09/25/19  0909 09/18/19  0739  05/11/16  0840 05/10/16  1208  04/21/16  1400  03/03/16  2358   HGB 13.3  --  14.2   < > 11.2*  --    < > 12.1  --   --      --  190   < >  --   --    < > 198  --  155   INR  --   --   --   --   --  0.95  --  1.02  --   --     135 132*   < >  --   --    < > 135   < >  --    POTASSIUM 3.9 4.2 3.9   < > 4.1  --    < > 4.4   < >  --    CR 0.52 0.58 0.48*   < >  --   --    < > 0.54   < > 0.56   A1C  --   --   --   --  5.4  --   --   --   --  5.6    < > = values in this interval not displayed.        IMPRESSION:   Reason for surgery/procedure:  Hx colon polyps  Diagnosis/reason for consult:   1. CAD - no chest pain with activity. Only minimal lydia-infarct ischemia seen on stress test Feb 2020 and treated medically  2. HTN - controlled  3. Smoking - not interested in quitting at this time  4.  Hx SVT - pt denies recent sx since starting Carvedilol  5. COPD - stable with Breo inhaler  6. Carotid stenosis - No sx now. MRA  Sept 2019 showed: 60% narrowing of the proximal left internal carotid artery, and approximately 15% narrowing of the proximal right internal  carotid artery. Findings are not appreciably changed compared to 2016. No evidence of dissection with that imaging    The proposed surgical procedure is considered LOW risk.    REVISED CARDIAC RISK INDEX  The patient has the following serious cardiovascular risks for perioperative complications such as (MI, PE, VFib and 3  AV Block):  Coronary Artery Disease (MI, positive stress test, angina, Qs on EKG)  INTERPRETATION: 1 risks: Class II (low risk - 0.9% complication rate)    The patient has the following additional risks for perioperative complications:  No identified additional risks         RECOMMENDATIONS:     APPROVAL GIVEN to proceed with proposed  procedure, without further diagnostic evaluation     PLAN:   Hold Clopidogrel and Aspirin 7 days prior to the colonoscopy and then may restart afterwards right away unless instructed otherwise by GI to hold those two meds longer   Take Carvedilol and Amlodipine the AM of the procedure. Hold other meds that AM   NPO 4 hrs prior to the colonoscopy. Prep prior to that per GI  Use Breo inhaler the AM of the procedure    Signed Electronically by: Vasquez Benoit MD    Copy of this evaluation report is provided to requesting physician.    [facility] Preop Guidelines    Revised Cardiac Risk Index

## 2020-03-12 NOTE — PATIENT INSTRUCTIONS
Hold Clopidogrel and Aspirin 7 days prior to the colonoscopy and then may restart afterwards right away unless instructed otherwise by GI to hold those two meds longer   Take Carvedilol and Amlodipine the AM of the procedure. Hold other meds that AM   NPO 4 hrs prior to the colonoscopy. Prep prior to that per GI  Use Breo inhaler the AM of the procedure

## 2020-03-12 NOTE — LETTER
[facility]  [address] [address]  [phone]      3/12/2020       Shirley Hendricks  [address] [facility] [address]         To whom it may concern,   Shirley was seen in clinic today. She continues to recover from a fall  that occurred 2/26/20. [employer] excuse her from work for 2 more weeks to assist  In her recovery and current back pain issues. If you have further questions regarding this matter, please feel free to contact me at [phone]    Sincerely,          Vasquez Benoit MD  Internal Medicine

## 2020-03-24 ENCOUNTER — VIRTUAL VISIT (OUTPATIENT)
Dept: CARDIOLOGY | Facility: CLINIC | Age: 60
End: 2020-03-24
Payer: COMMERCIAL

## 2020-03-24 DIAGNOSIS — R00.2 PALPITATIONS: ICD-10-CM

## 2020-03-24 DIAGNOSIS — I10 ESSENTIAL HYPERTENSION: Primary | ICD-10-CM

## 2020-03-24 PROCEDURE — 99213 OFFICE O/P EST LOW 20 MIN: CPT | Mod: TEL | Performed by: INTERNAL MEDICINE

## 2020-03-24 NOTE — PROGRESS NOTES
"Shirley Hendricks is a 59 year old female who is being evaluated via a billable telephone visit.      The patient has been notified of following:     \"This telephone visit will be conducted via a call between you and your physician/provider. We have found that certain health care needs can be provided without the need for a physical exam.  This service lets us provide the care you need with a short phone conversation.  If a prescription is necessary we can send it directly to your pharmacy.  If lab work is needed we can place an order for that and you can then stop by our lab to have the test done at a later time.     If during the course of the call the physician/provider feels a telephone visit is not appropriate, you will not be charged for this service.\"     Shirley Hendricks complains of  No chief complaint on file.    I have reviewed and updated the patient's Past Medical History, Social History, Family History and Medication List.    ALLERGIES  Contrast dye; Pantoprazole; Chantix [varenicline]; and Penicillins    Weight- 125.5lbs  BP- 176/83    Patient was light headed last night so she did not take her medication last night. It was 101/53. She took her usual dose this morning.    CHENCHO Charles    Additional provider notes:  Today at the pleasure of talking to Ms. Hendricks over the phone for a telephone encounter.  She was scheduled to come in to see me today for 1 month follow-up visit but due to coronavirus pandemic we decided to switch this to over the telephone encounter.    For full H&P please review my note dated 2/21/2020.  Briefly, she has history of chronic smoking, severe peripheral vascular disease requiring CEA, femoropopliteal bypass, hypertension, hyperlipidemia and coronary disease. She had LHC in 2009 which showed diffuse RCA disease with 30-50% stenosis, mid LAD with 30-40% stenosis, and 20-30% stenosis of PL branch.  She also underwent a ZIO Patch recently which showed 3 NSVT events, " longest 10 beats and 47 SVT runs, longest 20 beats. Last echocardiogram from 2016 showed normal LV size and systolic function, no WMA, no valvular disease.  EKG showed lower voltage complex, septal infarct.  Recent NM stress in 2/2020 showed small sized infarct in the anterolateral wall with some lydia-infarct ischemia which we decided to manage medically.      Last visit she was she reported generalized fatigue and being hypertensive on several occasion. Hence I switched her from metoprolol to coreg and she reports feeling better today. She was a little light headed yesterday and hence skipped the night dose of her antihypertensives. 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 She took the last few days off after sustaining a fall while moving her garbage.Fortunately she didn't sustain and serious injury.     Assessment and plan-   1.  Coronary artery disease  2.  Peripheral vascular disease  3.  Hypertension  4.  Hyperlipidemia  5.  Smoker    Continue current meds. Switching from Metoprolol to Coreg has led to improvement in fatigue and better control of BP.  She denies CP and SOB at this time. Advised to cut down on smoking and stop completely as soon as possible. Will see her in 3-6 months.    Plan  1. Continue current meds  2. F/u in 3-6 months.     MD Marlon

## 2020-03-26 ENCOUNTER — MYC MEDICAL ADVICE (OUTPATIENT)
Dept: INTERNAL MEDICINE | Facility: CLINIC | Age: 60
End: 2020-03-26

## 2020-03-26 NOTE — TELEPHONE ENCOUNTER
PCP please see MyChart message would you like patient to follow up with a phone visit regarding more time out of work and continued pain?    Simona RAMIREZ, RN, PHN

## 2020-04-01 ENCOUNTER — MYC MEDICAL ADVICE (OUTPATIENT)
Dept: INTERNAL MEDICINE | Facility: CLINIC | Age: 60
End: 2020-04-01

## 2020-04-01 NOTE — LETTER
[facility]  [address] [place] [address]  [phone]      4/1/2020     Regarding:  Shirley Hendricks  [address] [facility] [address]  1960        To whom it may concern,  Shirley Hendricks had been out of work since 2/26/20 due to a fall and head injury.  I had originally expected her to be able to return to work 3/26/20 but she had some persistent lightheadedness issues that delayed  that plan. She has informed me that she has now been asymptomatic the past 2 days and and appears to have now recovered from this event. To be sure she remains stable for a longer period of time, I have recommended   that she remain off of work through the end of this week and should be able to return to work starting Monday 4/8/20 without restriction. If she has any changes between now and then,she will contact our clinic. If you have further questions regarding this matter, please feel free to contact me at [phone]      Sincerely,          Vasquez Benoit MD  Internal Medicine

## 2020-04-01 NOTE — LETTER
[facility]  [address] [place] [address]  [phone]      4/2/2020       Shirley Hendricks  [address] [facility] [address]        To whom it may concern,  Shirley Hendricks had been out of work since 2/26/20 due to a fall and head injury.  I had originally expected her to be able to return to work 3/26/20 but she had some persistent lightheadedness issues that delayed  that plan. She has informed me that she has now been asymptomatic the past 2 days and and appears to have now recovered from this event. To be sure she remains stable for a longer period of time, I have recommended   that she remain off of work through the end of this week and should be able to return to work starting Monday 4/6/20 without restriction. If she has any changes between now and then,she will contact our clinic. If you have further questions regarding this matter, please feel free to contact me at [phone]    Sincerely,      Vasquez Benoit MD  Internal Medicine

## 2020-04-01 NOTE — TELEPHONE ENCOUNTER
Spoke with pt and no sx for 2 days and feels able to return to work soon. To be sure remains stable, will have pt RTW on 4/8/20 and pt agreeable. Letter writen and in [facility] basket. Please mail to pt today per her request

## 2020-04-02 NOTE — TELEPHONE ENCOUNTER
Sorry for type error. New letter done with corrected date Monday 4/6/20. Please print letter and send to pt in mail tomorrow unless wishes to PU at the the clinic entrance with nurse table there. If so, then put in envelope and bring downstairs

## 2020-04-02 NOTE — TELEPHONE ENCOUNTER
Patient called, letter dates were wrong.  Letter says Monday 4/8 and should say Monday 4/6. Please make change and resend to home address.

## 2020-04-03 ENCOUNTER — TELEPHONE (OUTPATIENT)
Dept: INTERNAL MEDICINE | Facility: CLINIC | Age: 60
End: 2020-04-03

## 2020-04-03 NOTE — TELEPHONE ENCOUNTER
Reason for call:  Form   Our goal is to have forms completed within 72 hours, however some forms may require a visit or additional information.     Who is the form from? Insurance comp  Where did the form come from? form was mailed in  What clinic location was the form placed at? [facility]  Where was the form placed? Pcp's Folder  What number is listed as a contact on the form? [phone]    Phone call message - patient request for a letter, form or note:     Date needed: as soon as possible  Please fax to [phone]  Has the patient signed a consent form for release of information? NO    Additional comments: Fax form to [phone] and then mail form back to patients home address.    Type of letter, form or note: disability    Phone number to reach patient:  Home number on file [phone] (home)    Best Time:      Can we leave a detailed message on this number?  YES    Travel screening: Not Applicable

## 2020-04-16 ENCOUNTER — MYC REFILL (OUTPATIENT)
Dept: INTERNAL MEDICINE | Facility: CLINIC | Age: 60
End: 2020-04-16

## 2020-04-16 DIAGNOSIS — I10 ESSENTIAL HYPERTENSION: ICD-10-CM

## 2020-04-16 RX ORDER — AMLODIPINE BESYLATE 10 MG/1
5 TABLET ORAL 2 TIMES DAILY
Qty: 90 TABLET | Refills: 3 | Status: CANCELLED | OUTPATIENT
Start: 2020-04-16

## 2020-04-18 ENCOUNTER — MYC MEDICAL ADVICE (OUTPATIENT)
Dept: INTERNAL MEDICINE | Facility: CLINIC | Age: 60
End: 2020-04-18

## 2020-04-20 ASSESSMENT — PATIENT HEALTH QUESTIONNAIRE - PHQ9: SUM OF ALL RESPONSES TO PHQ QUESTIONS 1-9: 2

## 2020-04-20 NOTE — TELEPHONE ENCOUNTER
Looked for form in Dr. Benoit's folder and at  stations. Did not find them.   Was told that Dr. Mendiola has been doing some of Dr. Benoit's forms while he is out of the office.   Dr. Mendiola has left for the day so I will check with him tomorrow.     Called patient, left VM message asking, if possible, for her to get those forms re-faxed to [facility] fax.  Did tell patient I would continue to look for forms and update her soon. Olivia Melendez, CMA

## 2020-04-21 DIAGNOSIS — F17.200 TOBACCO USE DISORDER: ICD-10-CM

## 2020-04-21 DIAGNOSIS — J44.9 CHRONIC OBSTRUCTIVE PULMONARY DISEASE, UNSPECIFIED COPD TYPE (H): ICD-10-CM

## 2020-04-21 NOTE — TELEPHONE ENCOUNTER
"Requested Prescriptions   Pending Prescriptions Disp Refills     BREO ELLIPTA 200-25 MCG/INH Inhaler [Pharmacy Med Name: Breo Ellipta Inhalation Aerosol Powder Breath Activated 200-25 MCG/INH]  0     Sig: Inhale 1 puff into the lungs daily INDICATION: COPD CONTROLLER       Inhaled Steroids Protocol Failed - 4/21/2020  9:36 AM        Failed - Asthma control assessment score within normal limits in last 6 months     Please review ACT score.           Passed - Patient is age 12 or older        Passed - Medication is active on med list        Passed - Recent (6 mo) or future (30 days) visit within the authorizing provider's specialty     Patient had office visit in the last 6 months or has a visit in the next 30 days with authorizing provider or within the authorizing provider's specialty.  See \"Patient Info\" tab in inbasket, or \"Choose Columns\" in Meds & Orders section of the refill encounter.                 "

## 2020-04-23 NOTE — TELEPHONE ENCOUNTER
Please note that I just recheck Dr. Benoit's folder and there are no forms available.    Please note patient needs to know that dependent on the extent to the form from the specific questions that this may need to wait until Dr. Archibald return once reviewed.    See letter done by Dr. Benoit regarding the patient dated 4/1/2020:    Regarding:  Shirley Hendricks  [address] [facility] [address]  1960        To whom it may concern,  Shirley Hendricks had been out of work since 2/26/20 due to a fall and head injury.  I had originally expected her to be able to return to work 3/26/20 but she had some persistent lightheadedness issues that delayed  that plan. She has informed me that she has now been asymptomatic the past 2 days and and appears to have now recovered from this event. To be sure she remains stable for a longer period of time, I have recommended   that she remain off of work through the end of this week and should be able to return to work starting Monday 4/6/20 without restriction. If she has any changes between now and then,she will contact our clinic. If you have further questions regarding this matter, please feel free to contact me at [phone]

## 2020-04-23 NOTE — TELEPHONE ENCOUNTER
Pt called to check status of this. Do you have this form? We don't have it at the office. It is from [employer] insurance company.

## 2020-04-25 DIAGNOSIS — I10 ESSENTIAL HYPERTENSION: ICD-10-CM

## 2020-04-27 RX ORDER — LISINOPRIL 20 MG/1
20 TABLET ORAL 2 TIMES DAILY
Qty: 180 TABLET | Refills: 2 | Status: SHIPPED | OUTPATIENT
Start: 2020-04-27 | End: 2021-02-15

## 2020-05-03 NOTE — TELEPHONE ENCOUNTER
[facility]. Send to [facility] insurance and also check with pt to see if she wants a copy. Sorry for delay as MD has been out of the clinic

## 2020-06-03 ENCOUNTER — HOSPITAL ENCOUNTER (OUTPATIENT)
Dept: ULTRASOUND IMAGING | Facility: CLINIC | Age: 60
End: 2020-06-03
Attending: SURGERY
Payer: COMMERCIAL

## 2020-06-03 DIAGNOSIS — I65.29 CAROTID ARTERY STENOSIS: ICD-10-CM

## 2020-06-03 DIAGNOSIS — I73.9 PAD (PERIPHERAL ARTERY DISEASE) (H): ICD-10-CM

## 2020-06-03 PROCEDURE — 93880 EXTRACRANIAL BILAT STUDY: CPT

## 2020-06-03 PROCEDURE — 93926 LOWER EXTREMITY STUDY: CPT | Mod: LT

## 2020-06-03 PROCEDURE — 93924 LWR XTR VASC STDY BILAT: CPT

## 2020-06-04 ENCOUNTER — OFFICE VISIT (OUTPATIENT)
Dept: OTHER | Facility: CLINIC | Age: 60
End: 2020-06-04
Attending: SURGERY
Payer: COMMERCIAL

## 2020-06-04 VITALS — SYSTOLIC BLOOD PRESSURE: 179 MMHG | DIASTOLIC BLOOD PRESSURE: 72 MMHG | HEART RATE: 54 BPM

## 2020-06-04 DIAGNOSIS — I73.9 PERIPHERAL VASCULAR DISEASE (H): ICD-10-CM

## 2020-06-04 DIAGNOSIS — I65.22 SYMPTOMATIC STENOSIS OF LEFT CAROTID ARTERY: Primary | ICD-10-CM

## 2020-06-04 DIAGNOSIS — I73.9 PAD (PERIPHERAL ARTERY DISEASE) (H): ICD-10-CM

## 2020-06-04 DIAGNOSIS — Z98.890 HISTORY OF RIGHT-SIDED CAROTID ENDARTERECTOMY: ICD-10-CM

## 2020-06-04 DIAGNOSIS — I65.29 SYMPTOMATIC CAROTID ARTERY STENOSIS: Primary | ICD-10-CM

## 2020-06-04 PROCEDURE — 99215 OFFICE O/P EST HI 40 MIN: CPT | Mod: ZP | Performed by: SURGERY

## 2020-06-04 PROCEDURE — G0463 HOSPITAL OUTPT CLINIC VISIT: HCPCS

## 2020-06-04 RX ORDER — CLOPIDOGREL BISULFATE 75 MG/1
75 TABLET ORAL DAILY
Qty: 90 TABLET | Refills: 3 | Status: SHIPPED | OUTPATIENT
Start: 2020-06-04 | End: 2021-04-04

## 2020-06-04 NOTE — TELEPHONE ENCOUNTER
Routing refill request to provider for review/approval because:  No active PPI on record unless is Protonix

## 2020-06-04 NOTE — PROGRESS NOTES
"Shirley Hendricks is a 59 year old female who presents for:  Chief Complaint   Patient presents with     RECHECK     History of right carotid endarterectomy 5/11/2016, Aortobifemoral bypass, left common femoral artery to above knee popliteal in situ bypass 3/2010; 9 month follow up to 8/23/18 appointment with Dr. Lozano. Patient has not been seen since 8/2018        Vitals:    Vitals:    06/04/20 1538   BP: (!) 179/72   BP Location: Left arm   Patient Position: Chair   Cuff Size: Adult Regular   Pulse: 54       BMI:  Estimated body mass index is 22.93 kg/m  as calculated from the following:    Height as of 2/26/20: 5' 2.4\" (1.585 m).    Weight as of 3/12/20: 127 lb (57.6 kg).    Pain Score:  Data Unavailable        Hanny Al MA    "

## 2020-06-04 NOTE — NURSING NOTE
Patient Education    Procedure: LEFT CAROTID ENDARTERECTOMY WITH EEG  Diagnosis: SYMPTOMATIC LEFT CAROTID ARTERY STENOSIS  Anticoagulation Instruction: STOP PLAVIX 1 DAY PRIOR  Pre-Operative Physical Exam: You need to have a pre-op physical exam within 30 days of your procedure. Your procedure may be cancelled if you do not have a current History and Physical. Call your PCP's office to schedule.  Allergies:  Updated in Epic  Bowel Prep: N/A  Post Procedure Education: [facility] patient post-procedure fact sheet reviewed with patient.    COVID-19 instructions for isolation and pre testing reviewed with pt.    Learner(s):patient  Method: Listening, Reading  Barriers to Learning:No Barrier  Outcome: Patient did verbalize understanding of above education.    Alaina Yanez, ESTHERN, RN-[facility]

## 2020-06-05 ENCOUNTER — TELEPHONE (OUTPATIENT)
Dept: OTHER | Facility: CLINIC | Age: 60
End: 2020-06-05

## 2020-06-05 DIAGNOSIS — Z11.59 ENCOUNTER FOR SCREENING FOR OTHER VIRAL DISEASES: Primary | ICD-10-CM

## 2020-06-05 NOTE — TELEPHONE ENCOUNTER
Spoke with Shirley to confirm 2:30pm surgical time on Monday.    Type of surgery: LEFT CAROTID ENDARTERECTOMY  Location of surgery: [facility]  Date and time of surgery: 06/08/20 @ 2:30PM  Surgeon: DR. URIOSTEGUI  Pre-Op Appt Date: DR. URIOSTEGUI DID PRE-OP  Post-Op Appt Date: PT TO CALL   Packet sent out: GIVEN TO PT IN CLINIC ON 06/04/20  Pre-cert/Authorization completed:  Yes  Date: 06/05/20

## 2020-06-06 DIAGNOSIS — Z11.59 ENCOUNTER FOR SCREENING FOR OTHER VIRAL DISEASES: ICD-10-CM

## 2020-06-06 LAB
SARS-COV-2 RNA SPEC QL NAA+PROBE: NOT DETECTED
SPECIMEN SOURCE: NORMAL

## 2020-06-07 ENCOUNTER — TELEPHONE (OUTPATIENT)
Dept: OTHER | Facility: CLINIC | Age: 60
End: 2020-06-07

## 2020-06-07 NOTE — TELEPHONE ENCOUNTER
[facility]    I called Shirley Hendricks about her left CEA tomorrow.  She developed a critical stenosis and has had at least 3 episodes of right arm weakness and numbness consistent with TIAs.  This occurred despite being on Plavix and aspirin.    Plan a left CEA tomorrow afternoon.  Try to reach patient on both cell and home number and left messages.       Chadwick Lozano MD

## 2020-06-08 ENCOUNTER — ANESTHESIA (OUTPATIENT)
Dept: SURGERY | Facility: CLINIC | Age: 60
DRG: 039 | End: 2020-06-08
Payer: COMMERCIAL

## 2020-06-08 ENCOUNTER — ANESTHESIA EVENT (OUTPATIENT)
Dept: SURGERY | Facility: CLINIC | Age: 60
DRG: 039 | End: 2020-06-08
Payer: COMMERCIAL

## 2020-06-08 ENCOUNTER — APPOINTMENT (OUTPATIENT)
Dept: SURGERY | Facility: PHYSICIAN GROUP | Age: 60
End: 2020-06-08
Payer: COMMERCIAL

## 2020-06-08 ENCOUNTER — HOSPITAL ENCOUNTER (INPATIENT)
Facility: CLINIC | Age: 60
LOS: 1 days | Discharge: HOME | DRG: 039 | End: 2020-06-09
Attending: SURGERY | Admitting: SURGERY
Payer: COMMERCIAL

## 2020-06-08 DIAGNOSIS — S05.01XA ABRASION OF RIGHT CORNEA, INITIAL ENCOUNTER: Primary | ICD-10-CM

## 2020-06-08 DIAGNOSIS — E78.5 HYPERLIPIDEMIA LDL GOAL <70: ICD-10-CM

## 2020-06-08 DIAGNOSIS — I65.29 SYMPTOMATIC CAROTID ARTERY STENOSIS: ICD-10-CM

## 2020-06-08 DIAGNOSIS — Z98.890 S/P CAROTID ENDARTERECTOMY: ICD-10-CM

## 2020-06-08 DIAGNOSIS — G89.18 POSTOPERATIVE PAIN: ICD-10-CM

## 2020-06-08 LAB
ANION GAP SERPL CALCULATED.3IONS-SCNC: 7 MMOL/L (ref 3–14)
BASOPHILS # BLD AUTO: 0.1 10E9/L (ref 0–0.2)
BASOPHILS NFR BLD AUTO: 1.2 %
BUN SERPL-MCNC: 9 MG/DL (ref 7–30)
CALCIUM SERPL-MCNC: 9 MG/DL (ref 8.5–10.1)
CHLORIDE SERPL-SCNC: 105 MMOL/L (ref 94–109)
CHOLEST SERPL-MCNC: 136 MG/DL
CO2 SERPL-SCNC: 25 MMOL/L (ref 20–32)
CREAT SERPL-MCNC: 0.56 MG/DL (ref 0.52–1.04)
DIFFERENTIAL METHOD BLD: ABNORMAL
EOSINOPHIL # BLD AUTO: 0.1 10E9/L (ref 0–0.7)
EOSINOPHIL NFR BLD AUTO: 2.4 %
ERYTHROCYTE [DISTWIDTH] IN BLOOD BY AUTOMATED COUNT: 13.5 % (ref 10–15)
GFR SERPL CREATININE-BSD FRML MDRD: >90 ML/MIN/{1.73_M2}
GLUCOSE SERPL-MCNC: 85 MG/DL (ref 70–99)
HBA1C MFR BLD: 5.5 % (ref 0–5.6)
HCT VFR BLD AUTO: 42 % (ref 35–47)
HDLC SERPL-MCNC: 45 MG/DL
HGB BLD-MCNC: 14.1 G/DL (ref 11.7–15.7)
IMM GRANULOCYTES # BLD: 0 10E9/L (ref 0–0.4)
IMM GRANULOCYTES NFR BLD: 0.2 %
LDLC SERPL CALC-MCNC: 74 MG/DL
LYMPHOCYTES # BLD AUTO: 1.4 10E9/L (ref 0.8–5.3)
LYMPHOCYTES NFR BLD AUTO: 26.8 %
MCH RBC QN AUTO: 31.4 PG (ref 26.5–33)
MCHC RBC AUTO-ENTMCNC: 33.6 G/DL (ref 31.5–36.5)
MCV RBC AUTO: 94 FL (ref 78–100)
MONOCYTES # BLD AUTO: 0.3 10E9/L (ref 0–1.3)
MONOCYTES NFR BLD AUTO: 6.1 %
NEUTROPHILS # BLD AUTO: 3.2 10E9/L (ref 1.6–8.3)
NEUTROPHILS NFR BLD AUTO: 63.3 %
NONHDLC SERPL-MCNC: 91 MG/DL
NRBC # BLD AUTO: 0 10*3/UL
NRBC BLD AUTO-RTO: 0 /100
PLATELET # BLD AUTO: 149 10E9/L (ref 150–450)
POTASSIUM SERPL-SCNC: 4.1 MMOL/L (ref 3.4–5.3)
RBC # BLD AUTO: 4.49 10E12/L (ref 3.8–5.2)
SODIUM SERPL-SCNC: 137 MMOL/L (ref 133–144)
TRIGL SERPL-MCNC: 85 MG/DL
WBC # BLD AUTO: 5.1 10E9/L (ref 4–11)

## 2020-06-08 PROCEDURE — 99223 1ST HOSP IP/OBS HIGH 75: CPT | Performed by: INTERNAL MEDICINE

## 2020-06-08 PROCEDURE — 25800030 ZZH RX IP 258 OP 636: Performed by: NURSE ANESTHETIST, CERTIFIED REGISTERED

## 2020-06-08 PROCEDURE — 25000125 ZZHC RX 250: Performed by: NURSE ANESTHETIST, CERTIFIED REGISTERED

## 2020-06-08 PROCEDURE — 25000132 ZZH RX MED GY IP 250 OP 250 PS 637: Performed by: PHYSICIAN ASSISTANT

## 2020-06-08 PROCEDURE — 12000000 ZZH R&B MED SURG/OB

## 2020-06-08 PROCEDURE — 85025 COMPLETE CBC W/AUTO DIFF WBC: CPT | Performed by: SURGERY

## 2020-06-08 PROCEDURE — 36000093 ZZH SURGERY LEVEL 4 1ST 30 MIN: Performed by: SURGERY

## 2020-06-08 PROCEDURE — 25000128 H RX IP 250 OP 636: Performed by: NURSE ANESTHETIST, CERTIFIED REGISTERED

## 2020-06-08 PROCEDURE — 36415 COLL VENOUS BLD VENIPUNCTURE: CPT | Performed by: SURGERY

## 2020-06-08 PROCEDURE — 25800030 ZZH RX IP 258 OP 636: Performed by: ANESTHESIOLOGY

## 2020-06-08 PROCEDURE — 25000128 H RX IP 250 OP 636: Performed by: ANESTHESIOLOGY

## 2020-06-08 PROCEDURE — 71000015 ZZH RECOVERY PHASE 1 LEVEL 2 EA ADDTL HR: Performed by: SURGERY

## 2020-06-08 PROCEDURE — 25000132 ZZH RX MED GY IP 250 OP 250 PS 637: Performed by: STUDENT IN AN ORGANIZED HEALTH CARE EDUCATION/TRAINING PROGRAM

## 2020-06-08 PROCEDURE — 25000132 ZZH RX MED GY IP 250 OP 250 PS 637

## 2020-06-08 PROCEDURE — 35301 RECHANNELING OF ARTERY: CPT | Mod: LT | Performed by: SURGERY

## 2020-06-08 PROCEDURE — 40000171 ZZH STATISTIC PRE-PROCEDURE ASSESSMENT III: Performed by: SURGERY

## 2020-06-08 PROCEDURE — 80048 BASIC METABOLIC PNL TOTAL CA: CPT | Performed by: SURGERY

## 2020-06-08 PROCEDURE — 25000128 H RX IP 250 OP 636: Performed by: SURGERY

## 2020-06-08 PROCEDURE — 03UL07Z SUPPLEMENT LEFT INTERNAL CAROTID ARTERY WITH AUTOLOGOUS TISSUE SUBSTITUTE, OPEN APPROACH: ICD-10-PCS | Performed by: SURGERY

## 2020-06-08 PROCEDURE — 71000014 ZZH RECOVERY PHASE 1 LEVEL 2 FIRST HR: Performed by: SURGERY

## 2020-06-08 PROCEDURE — 25000125 ZZHC RX 250: Performed by: NURSE PRACTITIONER

## 2020-06-08 PROCEDURE — 99231 SBSQ HOSP IP/OBS SF/LOW 25: CPT | Performed by: NURSE PRACTITIONER

## 2020-06-08 PROCEDURE — 25800030 ZZH RX IP 258 OP 636: Performed by: SURGERY

## 2020-06-08 PROCEDURE — 25000566 ZZH SEVOFLURANE, EA 15 MIN: Performed by: SURGERY

## 2020-06-08 PROCEDURE — 93010 ELECTROCARDIOGRAM REPORT: CPT | Performed by: INTERNAL MEDICINE

## 2020-06-08 PROCEDURE — 36000063 ZZH SURGERY LEVEL 4 EA 15 ADDTL MIN: Performed by: SURGERY

## 2020-06-08 PROCEDURE — 03CL0ZZ EXTIRPATION OF MATTER FROM LEFT INTERNAL CAROTID ARTERY, OPEN APPROACH: ICD-10-PCS | Performed by: SURGERY

## 2020-06-08 PROCEDURE — 37000008 ZZH ANESTHESIA TECHNICAL FEE, 1ST 30 MIN: Performed by: SURGERY

## 2020-06-08 PROCEDURE — 37000009 ZZH ANESTHESIA TECHNICAL FEE, EACH ADDTL 15 MIN: Performed by: SURGERY

## 2020-06-08 PROCEDURE — 25000125 ZZHC RX 250: Performed by: SURGERY

## 2020-06-08 PROCEDURE — 27210794 ZZH OR GENERAL SUPPLY STERILE: Performed by: SURGERY

## 2020-06-08 PROCEDURE — 27211022 ZZHC OR IOM SUPPLIES OPNP: Performed by: SURGERY

## 2020-06-08 PROCEDURE — 03CN0ZZ EXTIRPATION OF MATTER FROM LEFT EXTERNAL CAROTID ARTERY, OPEN APPROACH: ICD-10-PCS | Performed by: SURGERY

## 2020-06-08 PROCEDURE — 80061 LIPID PANEL: CPT | Performed by: SURGERY

## 2020-06-08 PROCEDURE — 93005 ELECTROCARDIOGRAM TRACING: CPT

## 2020-06-08 PROCEDURE — 95940 IONM IN OPERATNG ROOM 15 MIN: CPT | Performed by: SURGERY

## 2020-06-08 PROCEDURE — 83036 HEMOGLOBIN GLYCOSYLATED A1C: CPT | Performed by: SURGERY

## 2020-06-08 RX ORDER — HEPARIN SODIUM 1000 [USP'U]/ML
INJECTION, SOLUTION INTRAVENOUS; SUBCUTANEOUS PRN
Status: DISCONTINUED | OUTPATIENT
Start: 2020-06-08 | End: 2020-06-08

## 2020-06-08 RX ORDER — CLOPIDOGREL BISULFATE 75 MG/1
75 TABLET ORAL DAILY
Status: DISCONTINUED | OUTPATIENT
Start: 2020-06-08 | End: 2020-06-09 | Stop reason: HOSPADM

## 2020-06-08 RX ORDER — FENTANYL CITRATE 50 UG/ML
INJECTION, SOLUTION INTRAMUSCULAR; INTRAVENOUS PRN
Status: DISCONTINUED | OUTPATIENT
Start: 2020-06-08 | End: 2020-06-08

## 2020-06-08 RX ORDER — DICLOFENAC SODIUM 1 MG/ML
2 SOLUTION/ DROPS OPHTHALMIC EVERY 6 HOURS PRN
Status: DISCONTINUED | OUTPATIENT
Start: 2020-06-08 | End: 2020-06-08

## 2020-06-08 RX ORDER — AMLODIPINE BESYLATE 5 MG/1
5 TABLET ORAL EVERY MORNING
Status: DISCONTINUED | OUTPATIENT
Start: 2020-06-09 | End: 2020-06-09 | Stop reason: HOSPADM

## 2020-06-08 RX ORDER — HYDRALAZINE HYDROCHLORIDE 20 MG/ML
20 INJECTION INTRAMUSCULAR; INTRAVENOUS EVERY 4 HOURS PRN
Status: DISCONTINUED | OUTPATIENT
Start: 2020-06-08 | End: 2020-06-09 | Stop reason: HOSPADM

## 2020-06-08 RX ORDER — NALOXONE HYDROCHLORIDE 0.4 MG/ML
.1-.4 INJECTION, SOLUTION INTRAMUSCULAR; INTRAVENOUS; SUBCUTANEOUS
Status: DISCONTINUED | OUTPATIENT
Start: 2020-06-08 | End: 2020-06-08

## 2020-06-08 RX ORDER — NEOSTIGMINE METHYLSULFATE 1 MG/ML
VIAL (ML) INJECTION PRN
Status: DISCONTINUED | OUTPATIENT
Start: 2020-06-08 | End: 2020-06-08

## 2020-06-08 RX ORDER — BUPIVACAINE HYDROCHLORIDE 5 MG/ML
INJECTION, SOLUTION PERINEURAL PRN
Status: DISCONTINUED | OUTPATIENT
Start: 2020-06-08 | End: 2020-06-08 | Stop reason: HOSPADM

## 2020-06-08 RX ORDER — SODIUM CHLORIDE, SODIUM LACTATE, POTASSIUM CHLORIDE, CALCIUM CHLORIDE 600; 310; 30; 20 MG/100ML; MG/100ML; MG/100ML; MG/100ML
INJECTION, SOLUTION INTRAVENOUS CONTINUOUS
Status: DISCONTINUED | OUTPATIENT
Start: 2020-06-08 | End: 2020-06-08 | Stop reason: HOSPADM

## 2020-06-08 RX ORDER — CLINDAMYCIN PHOSPHATE 900 MG/50ML
900 INJECTION, SOLUTION INTRAVENOUS
Status: COMPLETED | OUTPATIENT
Start: 2020-06-08 | End: 2020-06-08

## 2020-06-08 RX ORDER — ERYTHROMYCIN 5 MG/G
OINTMENT OPHTHALMIC 4 TIMES DAILY
Status: DISCONTINUED | OUTPATIENT
Start: 2020-06-08 | End: 2020-06-09 | Stop reason: HOSPADM

## 2020-06-08 RX ORDER — ALBUTEROL SULFATE 0.83 MG/ML
2.5 SOLUTION RESPIRATORY (INHALATION) EVERY 4 HOURS PRN
Status: DISCONTINUED | OUTPATIENT
Start: 2020-06-08 | End: 2020-06-08 | Stop reason: HOSPADM

## 2020-06-08 RX ORDER — HYDROMORPHONE HYDROCHLORIDE 1 MG/ML
.3-.5 INJECTION, SOLUTION INTRAMUSCULAR; INTRAVENOUS; SUBCUTANEOUS EVERY 5 MIN PRN
Status: DISCONTINUED | OUTPATIENT
Start: 2020-06-08 | End: 2020-06-08 | Stop reason: HOSPADM

## 2020-06-08 RX ORDER — ERYTHROMYCIN 5 MG/G
OINTMENT OPHTHALMIC 4 TIMES DAILY
Status: DISCONTINUED | OUTPATIENT
Start: 2020-06-08 | End: 2020-06-08

## 2020-06-08 RX ORDER — NITROGLYCERIN 0.4 MG/1
0.4 TABLET SUBLINGUAL SEE ADMIN INSTRUCTIONS
Status: DISCONTINUED | OUTPATIENT
Start: 2020-06-08 | End: 2020-06-09 | Stop reason: HOSPADM

## 2020-06-08 RX ORDER — ONDANSETRON 4 MG/1
4 TABLET, ORALLY DISINTEGRATING ORAL EVERY 6 HOURS PRN
Status: DISCONTINUED | OUTPATIENT
Start: 2020-06-08 | End: 2020-06-09 | Stop reason: HOSPADM

## 2020-06-08 RX ORDER — EPHEDRINE SULFATE 50 MG/ML
INJECTION, SOLUTION INTRAMUSCULAR; INTRAVENOUS; SUBCUTANEOUS PRN
Status: DISCONTINUED | OUTPATIENT
Start: 2020-06-08 | End: 2020-06-08

## 2020-06-08 RX ORDER — ROSUVASTATIN CALCIUM 20 MG/1
40 TABLET, COATED ORAL AT BEDTIME
Status: DISCONTINUED | OUTPATIENT
Start: 2020-06-08 | End: 2020-06-09 | Stop reason: HOSPADM

## 2020-06-08 RX ORDER — AMLODIPINE BESYLATE 5 MG/1
5 TABLET ORAL 2 TIMES DAILY
Status: DISCONTINUED | OUTPATIENT
Start: 2020-06-08 | End: 2020-06-08

## 2020-06-08 RX ORDER — ONDANSETRON 2 MG/ML
INJECTION INTRAMUSCULAR; INTRAVENOUS PRN
Status: DISCONTINUED | OUTPATIENT
Start: 2020-06-08 | End: 2020-06-08

## 2020-06-08 RX ORDER — ONDANSETRON 4 MG/1
4 TABLET, ORALLY DISINTEGRATING ORAL EVERY 30 MIN PRN
Status: DISCONTINUED | OUTPATIENT
Start: 2020-06-08 | End: 2020-06-08 | Stop reason: HOSPADM

## 2020-06-08 RX ORDER — ONDANSETRON 2 MG/ML
4 INJECTION INTRAMUSCULAR; INTRAVENOUS EVERY 6 HOURS PRN
Status: DISCONTINUED | OUTPATIENT
Start: 2020-06-08 | End: 2020-06-09 | Stop reason: HOSPADM

## 2020-06-08 RX ORDER — FENTANYL CITRATE 50 UG/ML
25-50 INJECTION, SOLUTION INTRAMUSCULAR; INTRAVENOUS EVERY 5 MIN PRN
Status: DISCONTINUED | OUTPATIENT
Start: 2020-06-08 | End: 2020-06-08 | Stop reason: HOSPADM

## 2020-06-08 RX ORDER — OXYCODONE HYDROCHLORIDE 5 MG/1
5 TABLET ORAL
Status: DISCONTINUED | OUTPATIENT
Start: 2020-06-08 | End: 2020-06-09 | Stop reason: HOSPADM

## 2020-06-08 RX ORDER — DICLOFENAC SODIUM 1 MG/ML
2 SOLUTION/ DROPS OPHTHALMIC EVERY 6 HOURS PRN
Status: DISCONTINUED | OUTPATIENT
Start: 2020-06-08 | End: 2020-06-09 | Stop reason: HOSPADM

## 2020-06-08 RX ORDER — PROPARACAINE HYDROCHLORIDE 5 MG/ML
2 SOLUTION/ DROPS OPHTHALMIC ONCE
Status: COMPLETED | OUTPATIENT
Start: 2020-06-08 | End: 2020-06-08

## 2020-06-08 RX ORDER — SODIUM CHLORIDE, SODIUM LACTATE, POTASSIUM CHLORIDE, CALCIUM CHLORIDE 600; 310; 30; 20 MG/100ML; MG/100ML; MG/100ML; MG/100ML
INJECTION, SOLUTION INTRAVENOUS CONTINUOUS PRN
Status: DISCONTINUED | OUTPATIENT
Start: 2020-06-08 | End: 2020-06-08

## 2020-06-08 RX ORDER — MAGNESIUM HYDROXIDE 1200 MG/15ML
LIQUID ORAL PRN
Status: DISCONTINUED | OUTPATIENT
Start: 2020-06-08 | End: 2020-06-08 | Stop reason: HOSPADM

## 2020-06-08 RX ORDER — AMLODIPINE BESYLATE 5 MG/1
5 TABLET ORAL EVERY MORNING
COMMUNITY
End: 2020-06-19

## 2020-06-08 RX ORDER — ONDANSETRON 2 MG/ML
4 INJECTION INTRAMUSCULAR; INTRAVENOUS EVERY 30 MIN PRN
Status: DISCONTINUED | OUTPATIENT
Start: 2020-06-08 | End: 2020-06-08 | Stop reason: HOSPADM

## 2020-06-08 RX ORDER — MEPERIDINE HYDROCHLORIDE 25 MG/ML
12.5 INJECTION INTRAMUSCULAR; INTRAVENOUS; SUBCUTANEOUS EVERY 5 MIN PRN
Status: DISCONTINUED | OUTPATIENT
Start: 2020-06-08 | End: 2020-06-08 | Stop reason: HOSPADM

## 2020-06-08 RX ORDER — LISINOPRIL 10 MG/1
20 TABLET ORAL 2 TIMES DAILY
Status: DISCONTINUED | OUTPATIENT
Start: 2020-06-08 | End: 2020-06-09 | Stop reason: HOSPADM

## 2020-06-08 RX ORDER — METOCLOPRAMIDE HYDROCHLORIDE 5 MG/ML
10 INJECTION INTRAMUSCULAR; INTRAVENOUS EVERY 6 HOURS PRN
Status: DISCONTINUED | OUTPATIENT
Start: 2020-06-08 | End: 2020-06-09 | Stop reason: HOSPADM

## 2020-06-08 RX ORDER — PROPOFOL 10 MG/ML
INJECTION, EMULSION INTRAVENOUS PRN
Status: DISCONTINUED | OUTPATIENT
Start: 2020-06-08 | End: 2020-06-08

## 2020-06-08 RX ORDER — KETOROLAC TROMETHAMINE 30 MG/ML
INJECTION, SOLUTION INTRAMUSCULAR; INTRAVENOUS PRN
Status: DISCONTINUED | OUTPATIENT
Start: 2020-06-08 | End: 2020-06-08

## 2020-06-08 RX ORDER — CARVEDILOL 6.25 MG/1
6.25 TABLET ORAL 2 TIMES DAILY WITH MEALS
Status: DISCONTINUED | OUTPATIENT
Start: 2020-06-08 | End: 2020-06-09 | Stop reason: HOSPADM

## 2020-06-08 RX ORDER — NITROGLYCERIN 0.4 MG/1
0.4 TABLET SUBLINGUAL EVERY 5 MIN PRN
Status: DISCONTINUED | OUTPATIENT
Start: 2020-06-08 | End: 2020-06-08

## 2020-06-08 RX ORDER — ACETAMINOPHEN 325 MG/1
650 TABLET ORAL EVERY 6 HOURS
Status: DISCONTINUED | OUTPATIENT
Start: 2020-06-08 | End: 2020-06-09 | Stop reason: HOSPADM

## 2020-06-08 RX ORDER — ASPIRIN 81 MG/1
81 TABLET ORAL DAILY
Status: DISCONTINUED | OUTPATIENT
Start: 2020-06-09 | End: 2020-06-09 | Stop reason: HOSPADM

## 2020-06-08 RX ORDER — METOCLOPRAMIDE 5 MG/1
10 TABLET ORAL EVERY 6 HOURS PRN
Status: DISCONTINUED | OUTPATIENT
Start: 2020-06-08 | End: 2020-06-09 | Stop reason: HOSPADM

## 2020-06-08 RX ORDER — GLYCOPYRROLATE 0.2 MG/ML
INJECTION, SOLUTION INTRAMUSCULAR; INTRAVENOUS PRN
Status: DISCONTINUED | OUTPATIENT
Start: 2020-06-08 | End: 2020-06-08

## 2020-06-08 RX ORDER — NALOXONE HYDROCHLORIDE 0.4 MG/ML
.1-.4 INJECTION, SOLUTION INTRAMUSCULAR; INTRAVENOUS; SUBCUTANEOUS
Status: DISCONTINUED | OUTPATIENT
Start: 2020-06-08 | End: 2020-06-09 | Stop reason: HOSPADM

## 2020-06-08 RX ORDER — HEPARIN SODIUM 1000 [USP'U]/ML
INJECTION, SOLUTION INTRAVENOUS; SUBCUTANEOUS PRN
Status: DISCONTINUED | OUTPATIENT
Start: 2020-06-08 | End: 2020-06-08 | Stop reason: HOSPADM

## 2020-06-08 RX ORDER — LABETALOL HYDROCHLORIDE 5 MG/ML
10 INJECTION, SOLUTION INTRAVENOUS EVERY 10 MIN PRN
Status: DISCONTINUED | OUTPATIENT
Start: 2020-06-08 | End: 2020-06-09 | Stop reason: HOSPADM

## 2020-06-08 RX ORDER — NITROGLYCERIN 10 MG/100ML
INJECTION INTRAVENOUS PRN
Status: DISCONTINUED | OUTPATIENT
Start: 2020-06-08 | End: 2020-06-08

## 2020-06-08 RX ORDER — LIDOCAINE 40 MG/G
CREAM TOPICAL
Status: DISCONTINUED | OUTPATIENT
Start: 2020-06-08 | End: 2020-06-09 | Stop reason: HOSPADM

## 2020-06-08 RX ORDER — PROPOFOL 10 MG/ML
INJECTION, EMULSION INTRAVENOUS CONTINUOUS PRN
Status: DISCONTINUED | OUTPATIENT
Start: 2020-06-08 | End: 2020-06-08

## 2020-06-08 RX ORDER — DEXAMETHASONE SODIUM PHOSPHATE 4 MG/ML
INJECTION, SOLUTION INTRA-ARTICULAR; INTRALESIONAL; INTRAMUSCULAR; INTRAVENOUS; SOFT TISSUE PRN
Status: DISCONTINUED | OUTPATIENT
Start: 2020-06-08 | End: 2020-06-08

## 2020-06-08 RX ORDER — PROCHLORPERAZINE MALEATE 5 MG
10 TABLET ORAL EVERY 6 HOURS PRN
Status: DISCONTINUED | OUTPATIENT
Start: 2020-06-08 | End: 2020-06-09 | Stop reason: HOSPADM

## 2020-06-08 RX ORDER — LIDOCAINE HYDROCHLORIDE 20 MG/ML
INJECTION, SOLUTION INFILTRATION; PERINEURAL PRN
Status: DISCONTINUED | OUTPATIENT
Start: 2020-06-08 | End: 2020-06-08

## 2020-06-08 RX ORDER — LIDOCAINE 40 MG/G
CREAM TOPICAL
Status: DISCONTINUED | OUTPATIENT
Start: 2020-06-08 | End: 2020-06-08

## 2020-06-08 RX ADMIN — ROCURONIUM BROMIDE 50 MG: 10 INJECTION INTRAVENOUS at 14:44

## 2020-06-08 RX ADMIN — DEXMEDETOMIDINE HYDROCHLORIDE 12 MCG: 100 INJECTION, SOLUTION INTRAVENOUS at 14:31

## 2020-06-08 RX ADMIN — FENTANYL CITRATE 50 MCG: 50 INJECTION, SOLUTION INTRAMUSCULAR; INTRAVENOUS at 15:01

## 2020-06-08 RX ADMIN — DEXAMETHASONE SODIUM PHOSPHATE 4 MG: 4 INJECTION, SOLUTION INTRA-ARTICULAR; INTRALESIONAL; INTRAMUSCULAR; INTRAVENOUS; SOFT TISSUE at 14:46

## 2020-06-08 RX ADMIN — ACETAMINOPHEN 650 MG: 325 TABLET, FILM COATED ORAL at 21:20

## 2020-06-08 RX ADMIN — HYDROMORPHONE HYDROCHLORIDE 0.5 MG: 1 INJECTION, SOLUTION INTRAMUSCULAR; INTRAVENOUS; SUBCUTANEOUS at 18:00

## 2020-06-08 RX ADMIN — Medication 5 MG: at 15:35

## 2020-06-08 RX ADMIN — MIDAZOLAM HYDROCHLORIDE 1 MG: 1 INJECTION, SOLUTION INTRAMUSCULAR; INTRAVENOUS at 14:15

## 2020-06-08 RX ADMIN — NICOTINE 1 PATCH: 7 PATCH, EXTENDED RELEASE TRANSDERMAL at 22:11

## 2020-06-08 RX ADMIN — LISINOPRIL 20 MG: 10 TABLET ORAL at 21:19

## 2020-06-08 RX ADMIN — CARVEDILOL 6.25 MG: 6.25 TABLET, FILM COATED ORAL at 21:20

## 2020-06-08 RX ADMIN — ERYTHROMYCIN 1 G: 5 OINTMENT OPHTHALMIC at 21:32

## 2020-06-08 RX ADMIN — NITROGLYCERIN 10 MCG: 10 INJECTION INTRAVENOUS at 15:42

## 2020-06-08 RX ADMIN — LIDOCAINE HYDROCHLORIDE 100 MG: 20 INJECTION, SOLUTION INFILTRATION; PERINEURAL at 14:44

## 2020-06-08 RX ADMIN — PHENYLEPHRINE HYDROCHLORIDE 0.15 MCG/KG/MIN: 10 INJECTION INTRAVENOUS at 15:31

## 2020-06-08 RX ADMIN — NITROGLYCERIN 5 MCG: 10 INJECTION INTRAVENOUS at 16:17

## 2020-06-08 RX ADMIN — PROPOFOL 140 MG: 10 INJECTION, EMULSION INTRAVENOUS at 14:44

## 2020-06-08 RX ADMIN — NITROGLYCERIN 5 MCG: 10 INJECTION INTRAVENOUS at 16:02

## 2020-06-08 RX ADMIN — PROPOFOL 20 MCG/KG/MIN: 10 INJECTION, EMULSION INTRAVENOUS at 14:46

## 2020-06-08 RX ADMIN — KETOROLAC TROMETHAMINE 15 MG: 30 INJECTION, SOLUTION INTRAMUSCULAR at 16:56

## 2020-06-08 RX ADMIN — HYDROMORPHONE HYDROCHLORIDE 0.5 MG: 1 INJECTION, SOLUTION INTRAMUSCULAR; INTRAVENOUS; SUBCUTANEOUS at 15:10

## 2020-06-08 RX ADMIN — NEOSTIGMINE METHYLSULFATE 3 MG: 1 INJECTION, SOLUTION INTRAVENOUS at 17:20

## 2020-06-08 RX ADMIN — ONDANSETRON 4 MG: 2 INJECTION INTRAMUSCULAR; INTRAVENOUS at 16:57

## 2020-06-08 RX ADMIN — HEPARIN SODIUM 5000 UNITS: 1000 INJECTION INTRAVENOUS; SUBCUTANEOUS at 15:43

## 2020-06-08 RX ADMIN — NITROGLYCERIN 10 MCG: 10 INJECTION INTRAVENOUS at 17:16

## 2020-06-08 RX ADMIN — NITROGLYCERIN 5 MCG: 10 INJECTION INTRAVENOUS at 16:32

## 2020-06-08 RX ADMIN — NITROGLYCERIN 5 MCG: 10 INJECTION INTRAVENOUS at 16:39

## 2020-06-08 RX ADMIN — PROPARACAINE HYDROCHLORIDE 2 DROP: 5 SOLUTION/ DROPS OPHTHALMIC at 21:30

## 2020-06-08 RX ADMIN — FENTANYL CITRATE 50 MCG: 50 INJECTION, SOLUTION INTRAMUSCULAR; INTRAVENOUS at 14:44

## 2020-06-08 RX ADMIN — PROPOFOL 50 MG: 10 INJECTION, EMULSION INTRAVENOUS at 17:16

## 2020-06-08 RX ADMIN — SODIUM CHLORIDE, POTASSIUM CHLORIDE, SODIUM LACTATE AND CALCIUM CHLORIDE: 600; 310; 30; 20 INJECTION, SOLUTION INTRAVENOUS at 14:21

## 2020-06-08 RX ADMIN — SODIUM CHLORIDE, POTASSIUM CHLORIDE, SODIUM LACTATE AND CALCIUM CHLORIDE: 600; 310; 30; 20 INJECTION, SOLUTION INTRAVENOUS at 14:53

## 2020-06-08 RX ADMIN — ROSUVASTATIN CALCIUM 40 MG: 20 TABLET, FILM COATED ORAL at 21:21

## 2020-06-08 RX ADMIN — Medication 5 MG: at 15:57

## 2020-06-08 RX ADMIN — ROCURONIUM BROMIDE 20 MG: 10 INJECTION INTRAVENOUS at 15:50

## 2020-06-08 RX ADMIN — CLOPIDOGREL BISULFATE 75 MG: 75 TABLET, FILM COATED ORAL at 21:20

## 2020-06-08 RX ADMIN — NITROGLYCERIN 10 MCG: 10 INJECTION INTRAVENOUS at 16:46

## 2020-06-08 RX ADMIN — GLYCOPYRROLATE 0.4 MG: 0.2 INJECTION, SOLUTION INTRAMUSCULAR; INTRAVENOUS at 17:18

## 2020-06-08 RX ADMIN — PROPOFOL 30 MG: 10 INJECTION, EMULSION INTRAVENOUS at 15:01

## 2020-06-08 RX ADMIN — NITROGLYCERIN 10 MCG: 10 INJECTION INTRAVENOUS at 16:52

## 2020-06-08 RX ADMIN — NITROGLYCERIN 10 MCG: 10 INJECTION INTRAVENOUS at 17:15

## 2020-06-08 RX ADMIN — Medication 5 MG: at 15:28

## 2020-06-08 RX ADMIN — CLINDAMYCIN PHOSPHATE 900 MG: 900 INJECTION, SOLUTION INTRAVENOUS at 14:50

## 2020-06-08 RX ADMIN — NITROGLYCERIN 5 MCG: 10 INJECTION INTRAVENOUS at 16:43

## 2020-06-08 ASSESSMENT — COPD QUESTIONNAIRES
CAT_SEVERITY: MODERATE
COPD: 1

## 2020-06-08 ASSESSMENT — LIFESTYLE VARIABLES: TOBACCO_USE: 1

## 2020-06-08 ASSESSMENT — MIFFLIN-ST. JEOR: SCORE: 1073.45

## 2020-06-08 ASSESSMENT — ACTIVITIES OF DAILY LIVING (ADL): ADLS_ACUITY_SCORE: 11

## 2020-06-08 ASSESSMENT — ENCOUNTER SYMPTOMS: SEIZURES: 0

## 2020-06-08 NOTE — ANESTHESIA CARE TRANSFER NOTE
Patient: Shirley Hendricks    Procedure(s):  LEFT CAROTID ENDARTERECTOMY with distal facal vein patch  and EEG    Diagnosis: Symptomatic carotid artery stenosis [I65.29]  Diagnosis Additional Information: No value filed.    Anesthesia Type:   General     Note:  Airway :Face Mask  Patient transferred to:PACU  Comments: Neuromuscular blockade reversed after TOF 4/4, spontaneous respirations, adequate tidal volumes, followed commands to voice, oropharynx suctioned with soft flexible catheter, extubated atraumatically, extubated with suction, airway patent after extubation.  Oxygen via facemask at 8 liters per minute to PACU. Oxygen tubing connected to wall O2 in PACU, SpO2, NiBP, and EKG monitors and alarms on and functioning, Heydi Hugger warmer connected to patient gown, report on patient's clinical status given to PACU RN, RN questions answered.   Handoff Report: Identifed the Patient, Identified the Reponsible Provider, Reviewed the pertinent medical history, Discussed the surgical course, Reviewed Intra-OP anesthesia mangement and issues during anesthesia, Set expectations for post-procedure period and Allowed opportunity for questions and acknowledgement of understanding      Vitals: (Last set prior to Anesthesia Care Transfer)    CRNA VITALS  6/8/2020 1706 - 6/8/2020 1744      6/8/2020             ART BP:  175/67    ART Mean:  110                Electronically Signed By: NOELLE Jackson CRNA  June 8, 2020  5:44 PM

## 2020-06-08 NOTE — BRIEF OP NOTE
[facility]    Brief Operative Note    Pre-operative diagnosis: Symptomatic carotid artery stenosis [I65.29], left  Post-operative diagnosis Same as pre-operative diagnosis    Procedure: Procedure(s):  LEFT CAROTID ENDARTERECTOMY with distal facal vein patch  and EEG  Surgeon: Surgeon(s) and Role:     * Chadwick Lozano MD - Primary     * Marleen Wright MD - Resident - Assisting     * Kaitlin Oates MD - Fellow - Assisting  Anesthesia: General   Estimated blood loss: 20 ml  Drains: None  Specimens: * No specimens in log *  Findings:   Plaque removed from common carotid, internal carotid, external carotid, and superior thyroid arteries. Shunt used. No EEG changes. External jugular vein inadequate with branches, left facial vein used as patch distally on internal carotid artery, common carotid large in size allowing for primary closure.   Complications: None.  Implants: * No implants in log *

## 2020-06-08 NOTE — ANESTHESIA PREPROCEDURE EVALUATION
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 Simons dermatology     Embolism and thrombosis of unspecified artery (H)     Protein C,S, Leiden FV, Lupus Inhibitor Negative     Gastroesophageal reflux disease      Hyperlipidaemia      Hypertension      Lupus (H)     skin     Mild major depression (H) 2017     Myocardial infarction (H)     x3     Osteoarthrosis, unspecified whether generalized or localized, unspecified site      PAD (peripheral artery disease) (H)      Peripheral vascular disease, unspecified (H)     s/p angioplasty with stent right femoral a.; Right iliac and femoral a. clot     Post-menopausal      Reflux esophagitis     EGD: esophagitis and medium HH     Uncomplicated asthma      Vitamin C deficiency 2018     Past Surgical History:   Procedure Laterality Date     ANGIOGRAM       C FABRIC WRAPPING OF ABDOMINAL ANEURYSM       C NONSPECIFIC PROCEDURE      angioplasty with stent right fem. a.     C NONSPECIFIC PROCEDURE      sinus surgery     C NONSPECIFIC PROCEDURE  2009    Emergent left groin exploration with oversewing of bleeding angiographic site. 2. Endarterectomy of common femoral-proximal superficial femoral artery with greater saphenous vein patch angioplasty.   a. Stow of accessory right greater saphenous vein.      C NONSPECIFIC PROCEDURE  2009    occluded left common iliac and external iliac arteries were successfully revascularized with stenting to 8 and 7 mm      CARDIAC CATHERIZATION  9/3/2009    multivessel CAD without target lesions, med mgmt indicated,  preserved ef     ENDARTERECTOMY CAROTID Right 5/11/2016    Procedure: ENDARTERECTOMY CAROTID;  Surgeon: Chadwick Lozano MD;  Location:  OR     GYN SURGERY  left tube    left salpingectomy     LAPAROSCOPIC CHOLECYSTECTOMY N/A 9/27/2017    Procedure: LAPAROSCOPIC CHOLECYSTECTOMY;  LAPAROSCOPIC CHOLECYSTECTOMY;  Surgeon: Jacoby Tapia MD;  Location: [facility]     ORTHOPEDIC SURGERY      left knee surgery     VASCULAR SURGERY  aoto bi fem  left fem-AK pop       Anesthesia Evaluation     . Pt has had prior anesthetic. Type: General (Ramirez 2 and MAC 3 = grade 1 view)    No history of anesthetic complications          ROS/MED HX    ENT/Pulmonary:     (+)allergic rhinitis, tobacco use, Current use moderate COPD, , . .   (-) sleep apnea   Neurologic: Comment: Lumbar spinal DJD    (+)TIA date: Early June features: Right arm weakness,    (-) seizures   Cardiovascular: Comment: History of SVT    Bilateral lower extremity bypasses and multiple stents    U/S carotid 6/2020: IMPRESSION:    1. Right internal carotid artery demonstrates changes of carotid  endarterectomy. There is elevated velocity of the distal right  internal carotid artery probably due to tortuosity of the vessel. Peak  systolic velocity suggests 50-69% stenosis by NASCET criteria, however  the elevated velocity is thought to be spurious and there is probably  less than 50% stenosis.  2. Greater than 70% diameter stenosis of the left ICA relative to the  distal ICA diameter. This has increased since 8/23/2018.     (+) Dyslipidemia, hypertension-Peripheral Vascular Disease-- Carotid Stenosis, CAD, -past MI,-. Taking blood thinners : . . . :. . Previous cardiac testing Echodate:2016results:Normal LV size, mass, wall motion, and function (LVEF 60-65%)  Normal RV size and function  No significant valvular heart disease  There is no right to left shunt at the atrial septal level by agitated saline  contrast study at rest and with Valsalva  maneuver.Stress Testdate:2/2020 results: The nuclear stress test is abnormal.    There is nontransmural infarction in the mid to distal anterolateral segment(s) of the left ventricle associated with a mild-moderate degree of lydia-infarct ischemia.    Left ventricular function is normal.    The left ventricular ejection fraction at stress is 65%.ECG reviewed date:6/2020 results:SB at 56 bpm date: results:         (-) CHF   METS/Exercise Tolerance:     Hematologic:         Musculoskeletal:   (+) arthritis,  -       GI/Hepatic:     (+) GERD Asymptomatic on medication,      (-) liver disease   Renal/Genitourinary:      (-) renal disease   Endo:      (-) Type I DM and Type II DM   Psychiatric:     (+) psychiatric history anxiety and depression      Infectious Disease:         Malignancy:         Other: Comment: History of lupus                         Physical Exam  Normal systems: dental    Airway   Mallampati: II  TM distance: >3 FB  Neck ROM: full    Dental     Cardiovascular   Rhythm and rate: regular      Pulmonary    breath sounds clear to auscultation            Lab Results   Component Value Date    WBC 6.0 02/16/2020    HGB 13.3 02/16/2020    HCT 40.1 02/16/2020     02/16/2020    CRP <2.9 09/18/2014    SED 9 12/06/2019     02/16/2020    POTASSIUM 3.9 02/16/2020    CHLORIDE 105 02/16/2020    CO2 26 02/16/2020    BUN 12 02/16/2020    CR 0.52 02/16/2020    GLC 86 02/16/2020    SONIA 8.9 02/16/2020    PHOS 4.3 09/13/2018    MAG 1.8 09/13/2018    ALBUMIN 3.6 09/25/2019    PROTTOTAL 6.8 09/25/2019    ALT 35 09/25/2019    AST 16 09/25/2019    ALKPHOS 66 09/25/2019    BILITOTAL 0.4 09/25/2019    LIPASE 101 02/01/2019    AMYLASE 46 08/14/2017    PTT 25 04/21/2016    INR 0.95 05/10/2016    TSH 0.89 02/16/2020    T4 1.19 03/28/2017       Preop Vitals  BP Readings from Last 3 Encounters:   06/08/20 (!) 180/97   06/04/20 (!) 179/72   03/12/20 124/72    Pulse Readings from Last 3 Encounters:   06/04/20 54   03/12/20  "56   03/02/20 57      Resp Readings from Last 3 Encounters:   06/08/20 16   03/12/20 16   03/02/20 16    SpO2 Readings from Last 3 Encounters:   06/08/20 96%   03/12/20 98%   03/02/20 100%      Temp Readings from Last 1 Encounters:   06/08/20 36.1  C (97  F) (Temporal)    Ht Readings from Last 1 Encounters:   06/08/20 1.575 m (5' 2\")      Wt Readings from Last 1 Encounters:   06/08/20 55.5 kg (122 lb 6.4 oz)    Estimated body mass index is 22.39 kg/m  as calculated from the following:    Height as of this encounter: 1.575 m (5' 2\").    Weight as of this encounter: 55.5 kg (122 lb 6.4 oz).       Anesthesia Plan      History & Physical Review  History and physical reviewed and following examination; no interval change.    ASA Status:  4 .    NPO Status:  > 8 hours    Plan for General (ETT) with Intravenous and Propofol induction. Maintenance will be Balanced.    PONV prophylaxis:  Ondansetron (or other 5HT-3) and Dexamethasone or Solumedrol  Additional equipment: 2nd IV and Arterial Line        Postoperative Care  Postoperative pain management:  Multi-modal analgesia.      Consents  Anesthetic plan, risks, benefits and alternatives discussed with:  Patient..                 New Noyola MD  "

## 2020-06-08 NOTE — ANESTHESIA PROCEDURE NOTES
ARTERIAL LINE PROCEDURE NOTE:  Staff -   Anesthesiologist:  New Noyola MD      Performed By: Anesthesiologist         Pre-Procedure  Performed by New Noyola MD  Location: OR      Pre-Anesthestic Checklist: patient identified, IV checked, site marked, risks and benefits discussed, informed consent, monitors and equipment checked and pre-op evaluation    Timeout  Correct Patient: Yes   Correct Procedure: Yes   Correct Site: Yes   Correct Laterality: Yes   Correct Position: Yes   Site Marked: N/A   .   Procedure Documentation  Procedure: arterial line    Supine  Insertion Site:brachial (Right).Injection technique: Seldinger Technique and ultrasound guided  .  .  Patient Prep/Sterile Barriers; all elements of maximal sterile barrier technique followed, mask, hat, sterile gown, sterile gloves, draped, hand hygiene, chlorhexidine gluconate and isopropyl alcohol    Assessment/Narrative    Catheter: 20 gauge, 1.75 in/4.5 cm quick cath (integral wire)      Tegaderm dressing used.    Arterial waveform: Yes     Comments:  Site sterilely prepped with Chloraprep.  Attempt to cannulate right radial artery unsuccessful.  Right brachial artery palpated.  Arrow catheter advanced into brachial artery under direct ultrasound guidance, with return of bright red blood.  Pulsatile waveform on monitor.      Ultrasound Interpretation, central venous and arterial catheter    1. Ultrasound guidance was used to evaluate potential access sites.  2. Ultrasound was also used to verify the patency of the vessel specified above.   3. Ultrasound was used to visualize the needle entering the vessel.   4. The visualized structures were anatomically normal.  5. There were no apparent abnormal pathological findings.  6. A permanent ultrasound image was saved in the patient's record.

## 2020-06-08 NOTE — CONSULTS
[facility]    Vascular Medicine Consultation     Attestation: I have examined the patient independently of Ailin Nichols PA-C and agree with the examination and plan as delineated below.    Dimitry Alvarez MD      Date of Admission:  6/8/2020  Date of Consult (When I saw the patient): 06/08/20    Assessment & Plan   1. Symptomatic high grade left carotid artery stenosis undergoing a left carotid endarterectomy 6/8/20    Post-operative cares per Dr. Lozano. She had been on aspirin and Plavix as an outpatient. She does have some new moderate restenosis to 60% on the right post right carotid CEA in the past. This will be followed as an outpatient by Dr. Lozano.     2. Hyperlipidemia    Her lipid panel this admission revealed an LDL of 74, HDL 45, triglycerides 85, and total cholesterol 136 while on maximum dose Atorvastatin 80 mg daily. Given her carotid disease, she should optimally be treated slightly more aggressively to an LDL of less than 70. As such, we will switch her over to Crestor 40 mg daily. She should then have a lipid panel repeated in 3 months through her PCP. If her LDL is still not at goal, add Zetia 10 mg daily at that time.     3. Peripheral arterial disease with patent aortobifemoral bypass graft and left femoral-popliteal bypass graft    She does have chronic but not disabling claudication symptoms that may be somewhat worse in her right leg due to her known SFA occlusion. Continue medical management. Intensify her statin.     4. Hypertension    Continue prior to admission amlodipine, carvedilol, and lisinopril and monitor blood pressure closely.     5. Ongoing tobacco use    She has admitted to ongoing smoking to multiple providers at about 0.25 packs per day. The importance of complete tobacco cessation was stressed. Nicotine patches can be provided as needed.     Reason for Consult   Reason for consult: Asked by Dr. Lozano to evaluate vascular risk factors and assist with  medical management in this 59 year old female former smoker with a history of PAD, hypertension, hyperlipidemia, CAD, and carotid stenosis now presenting for a left carotid endarterectomy for symptomatic left carotid artery stenosis.     Primary Care Physician   Vasquez Benoit      History of Present Illness   Shirley Hendricks is a 59 year old female former smoker normally followed by Dr. Narvaez who has a longstanding relationship with Dr. Chadwick Lozano of Vascular Surgery due to her prominent peripheral arterial disease and carotid disease.  She has previously had problems primarily with iliac occlusive disease and underwent angioplasty and stenting of her right common iliac artery in the calendar year 2000.  Her right external iliac artery occluded in 03/2004, and she underwent recanalization stenting at that time. In 2010 she developed worsening claudication symptoms in her left leg and due to the very small size of her diseased superficial femoral artery, the patient was taken to the operating room for a left common femoral artery to above-knee popliteal bypass with right greater saphenous vein as conduit.       Unfortunately she continued to smoke. In 6/2016 she underwent a right carotid endarterectomy for symptomatic stenosis. She has continued to follow-up with Dr. Lozano. On her most recent visit 6/4/202 she was noted to have significant worsening stenosis of the left carotid bifurcation to over 70%.  On her angiogram back on 9/18/2019 they found a less than 20% stenosis on the left with a 65% symptomatic stenosis on the right. She now has likely TIAs with right arm numbness and weakness and thus Dr. Lozano felt that rapid intervention is indicated and recommended a left carotid endarterectomy. She presented for this today.     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 clot     Post-menopausal      Reflux esophagitis 2/04    EGD: esophagitis and medium HH     Uncomplicated asthma      Vitamin C deficiency 8/12/2018       Past Surgical History   Past Surgical History:   Procedure Laterality Date     ANGIOGRAM       C FABRIC WRAPPING OF ABDOMINAL ANEURYSM       C NONSPECIFIC PROCEDURE  12/00    angioplasty with stent right fem. a.     C NONSPECIFIC PROCEDURE  1987    sinus surgery     C NONSPECIFIC PROCEDURE  9/2/2009    Emergent left groin exploration with oversewing of bleeding angiographic site. 2. Endarterectomy of common femoral-proximal superficial femoral artery with greater saphenous vein patch angioplasty.   a. [place] of accessory right greater saphenous vein.      C NONSPECIFIC PROCEDURE  8/27/2009    occluded left common iliac and external iliac arteries were successfully revascularized with stenting to 8 and 7 mm      CARDIAC CATHERIZATION  9/3/2009    multivessel CAD without target lesions, med mgmt indicated, preserved ef     ENDARTERECTOMY CAROTID Right 5/11/2016    Procedure: ENDARTERECTOMY CAROTID;  Surgeon: Chadwick Lozano MD;  Location:  OR     GYN SURGERY  left tube    left salpingectomy     LAPAROSCOPIC CHOLECYSTECTOMY N/A 9/27/2017    Procedure: LAPAROSCOPIC CHOLECYSTECTOMY;  LAPAROSCOPIC CHOLECYSTECTOMY;  Surgeon: Jacoby Tapia MD;  Location: [facility]      ORTHOPEDIC SURGERY      left knee surgery     VASCULAR SURGERY  aoto bi fem  left fem-AK pop       Prior to Admission Medications   Prior to Admission Medications   Prescriptions Last Dose Informant Patient Reported? Taking?   ASPIRIN EC PO 6/6/2020 Self Yes No   Sig: Take 81 mg by mouth daily   Acetaminophen (TYLENOL PO) 6/8/2020 at 0600 Self Yes No   Sig: Take 1,000-1,500 mg by mouth every 6 hours as needed for mild pain or fever    BREO ELLIPTA 200-25 MCG/INH Inhaler 6/8/2020 at 0600  No No   Sig: Inhale 1 puff into the lungs daily INDICATION: COPD CONTROLLER   CALCIUM 600-D 600-400 MG-UNIT TABS 6/7/2020 at 1700  No No   Sig: Take 1 tablet by mouth 2 times daily   amLODIPine 10 MG PO tablet 6/8/2020 at 0600  No No   Sig: Take 0.5 tablets (5 mg) by mouth 2 times daily   atorvastatin (LIPITOR) 80 MG tablet 6/7/2020 at 1700  No No   Sig: TAKE ONE TABLET (80MG) BY MOUTH EVERY EVENING INDICATION:TO LOWER CHOLESTEROL AND TO HELP REDUCE RISK OF REOCURRENCE OF HEAR ATTACK STROKE,A   carvedilol 6.25 MG PO tablet 6/8/2020 at 0600  No No   Sig: Take 1 tablet (6.25 mg) by mouth 2 times daily (with meals)   clopidogrel (PLAVIX) 75 MG tablet 6/6/2020 at 0600  No No   Sig: Take 1 tablet (75 mg) by mouth daily   denosumab (PROLIA) 60 MG/ML SOLN injection   No No   Sig: Inject 1 mL (60 mg) Subcutaneous every 6 months INDICATION: TO TREAT OSTEOPOROSIS   lisinopril (ZESTRIL) 20 MG tablet 6/8/2020 at 0600  No No   Sig: Take 1 tablet (20 mg) by mouth 2 times daily   nitroGLYcerin (NITROSTAT) 0.4 MG sublingual tablet   No No   Sig: Place 1 tablet (0.4 mg) under the tongue See Admin Instructions for chest pain   omeprazole (PRILOSEC) 20 MG DR capsule 6/8/2020 at 0600  No No   Sig: TAKE ONE CAPSULE BY MOUTH DAILY AS NEEDED      Facility-Administered Medications: None     Allergies   Allergies   Allergen Reactions     Contrast Dye Anaphylaxis     RASH, FACIAL AND NECK SWELLING, SOB, WHEEZING     Pantoprazole      Protonix caused  diffuse edema     Chantix [Varenicline]      Terrible dreams     Penicillins Itching       Social History   Shirley Hendricks  reports that she has been smoking cigarettes. She started smoking about 61 years ago. She has a 10.00 pack-year smoking history. She has never used smokeless tobacco. She reports current alcohol use. She reports that she does not use drugs.    Family History   Family History   Problem Relation Age of Onset     Cancer Mother         bladder     Cardiovascular Father         alive,multiple heart attacks     Diabetes Maternal Grandmother      Lung Cancer Maternal Grandmother      Blood Disease Brother         clotting disorder       Review of Systems   The 10 point Review of Systems is negative other than noted in the HPI or here.     Physical Exam   Temp: 97  F (36.1  C) Temp src: Temporal BP: (!) 180/97   Heart Rate: 56 Resp: 16 SpO2: 96 % O2 Device: None (Room air)    Vital Signs with Ranges  Temp:  [97  F (36.1  C)] 97  F (36.1  C)  Heart Rate:  [56] 56  Resp:  [16] 16  BP: (180)/(97) 180/97  SpO2:  [96 %] 96 %  122 lbs 6.4 oz    Constitutional: awake, alert, cooperative, no apparent distress, and appears stated age  Eyes: Lids and lashes normal, pupils equal, round and reactive to light, extra ocular muscles intact, sclera clear, conjunctiva normal  ENT: normocepalic, without obvious abnormality, oropharynx pink and moist  Hematologic / Lymphatic: no lymphadenopathy  Respiratory: No increased work of breathing, good air exchange, clear to auscultation bilaterally, no crackles or wheezing  Cardiovascular: regular rate and rhythm, normal S1 and S2 and no murmur noted  GI: Normal bowel sounds, soft, non-distended, non-tender  Skin: no redness, warmth, or swelling, no rashes and no lesions  Musculoskeletal: There is no redness, warmth, or swelling of the joints.  Full range of motion noted.  Motor strength is 5 out of 5 all extremities bilaterally.  Tone is normal.  Neurologic: Awake,  alert, oriented to name, place and time.  Cranial nerves II-XII are grossly intact.  Motor is 5 out of 5 bilaterally.    Neuropsychiatric:  Normal affect, memory, insight.  Pulses: No carotid bruits appreciated.     Data   Most Recent 3 CBC's:  Recent Labs   Lab Test 06/08/20  1302 02/16/20  1824 09/18/19  0739   WBC 5.1 6.0 5.4   HGB 14.1 13.3 14.2   MCV 94 94 94   * 176 190     Most Recent 3 BMP's:  Recent Labs   Lab Test 06/08/20  1302 02/16/20  1824 09/25/19  0909    136 135   POTASSIUM 4.1 3.9 4.2   CHLORIDE 105 105 104   CO2 25 26 29   BUN 9 12 9   CR 0.56 0.52 0.58   ANIONGAP 7 5 2*   SONIA 9.0 8.9 8.8   GLC 85 86 82     Most Recent 3 INR's:  Recent Labs   Lab Test 05/10/16  1208 04/21/16  1400   INR 0.95 1.02     Most Recent Cholesterol Panel:  Recent Labs   Lab Test 06/08/20  1302   CHOL 136   LDL 74   HDL 45*   TRIG 85     Most Recent Hemoglobin A1c:  Recent Labs   Lab Test 06/08/20  1302   A1C 5.5

## 2020-06-09 ENCOUNTER — APPOINTMENT (OUTPATIENT)
Dept: PHYSICAL THERAPY | Facility: CLINIC | Age: 60
DRG: 039 | End: 2020-06-09
Attending: SURGERY
Payer: COMMERCIAL

## 2020-06-09 VITALS
WEIGHT: 121.69 LBS | SYSTOLIC BLOOD PRESSURE: 102 MMHG | DIASTOLIC BLOOD PRESSURE: 50 MMHG | BODY MASS INDEX: 22.39 KG/M2 | HEART RATE: 50 BPM | HEIGHT: 62 IN | RESPIRATION RATE: 16 BRPM | TEMPERATURE: 98.5 F | OXYGEN SATURATION: 95 %

## 2020-06-09 LAB
ANION GAP SERPL CALCULATED.3IONS-SCNC: 5 MMOL/L (ref 3–14)
BUN SERPL-MCNC: 11 MG/DL (ref 7–30)
CALCIUM SERPL-MCNC: 8.3 MG/DL (ref 8.5–10.1)
CHLORIDE SERPL-SCNC: 101 MMOL/L (ref 94–109)
CO2 SERPL-SCNC: 25 MMOL/L (ref 20–32)
CREAT SERPL-MCNC: 0.54 MG/DL (ref 0.52–1.04)
ERYTHROCYTE [DISTWIDTH] IN BLOOD BY AUTOMATED COUNT: 13.7 % (ref 10–15)
GFR SERPL CREATININE-BSD FRML MDRD: >90 ML/MIN/{1.73_M2}
GLUCOSE BLDC GLUCOMTR-MCNC: 85 MG/DL (ref 70–99)
GLUCOSE SERPL-MCNC: 89 MG/DL (ref 70–99)
HCT VFR BLD AUTO: 36.1 % (ref 35–47)
HGB BLD-MCNC: 11.9 G/DL (ref 11.7–15.7)
MCH RBC QN AUTO: 31.1 PG (ref 26.5–33)
MCHC RBC AUTO-ENTMCNC: 33 G/DL (ref 31.5–36.5)
MCV RBC AUTO: 94 FL (ref 78–100)
PLATELET # BLD AUTO: 133 10E9/L (ref 150–450)
POTASSIUM SERPL-SCNC: 4.2 MMOL/L (ref 3.4–5.3)
RBC # BLD AUTO: 3.83 10E12/L (ref 3.8–5.2)
SODIUM SERPL-SCNC: 131 MMOL/L (ref 133–144)
WBC # BLD AUTO: 6.8 10E9/L (ref 4–11)

## 2020-06-09 PROCEDURE — 25000128 H RX IP 250 OP 636

## 2020-06-09 PROCEDURE — 25000132 ZZH RX MED GY IP 250 OP 250 PS 637

## 2020-06-09 PROCEDURE — 25000132 ZZH RX MED GY IP 250 OP 250 PS 637: Performed by: PHYSICIAN ASSISTANT

## 2020-06-09 PROCEDURE — 99233 SBSQ HOSP IP/OBS HIGH 50: CPT | Performed by: INTERNAL MEDICINE

## 2020-06-09 PROCEDURE — 36415 COLL VENOUS BLD VENIPUNCTURE: CPT

## 2020-06-09 PROCEDURE — 99407 BEHAV CHNG SMOKING > 10 MIN: CPT | Performed by: PHYSICAL THERAPIST

## 2020-06-09 PROCEDURE — 85027 COMPLETE CBC AUTOMATED: CPT

## 2020-06-09 PROCEDURE — 00000146 ZZHCL STATISTIC GLUCOSE BY METER IP

## 2020-06-09 PROCEDURE — 80048 BASIC METABOLIC PNL TOTAL CA: CPT

## 2020-06-09 PROCEDURE — 25000132 ZZH RX MED GY IP 250 OP 250 PS 637: Performed by: STUDENT IN AN ORGANIZED HEALTH CARE EDUCATION/TRAINING PROGRAM

## 2020-06-09 RX ORDER — DICLOFENAC SODIUM 1 MG/ML
2 SOLUTION/ DROPS OPHTHALMIC EVERY 6 HOURS PRN
Qty: 2.5 ML | Refills: 1 | Status: SHIPPED | OUTPATIENT
Start: 2020-06-09 | End: 2020-09-22

## 2020-06-09 RX ORDER — OXYCODONE HYDROCHLORIDE 5 MG/1
5 TABLET ORAL
Qty: 10 TABLET | Refills: 0 | Status: SHIPPED | OUTPATIENT
Start: 2020-06-09 | End: 2020-06-19

## 2020-06-09 RX ORDER — ERYTHROMYCIN 5 MG/G
OINTMENT OPHTHALMIC 4 TIMES DAILY
Qty: 1 G | Refills: 1 | Status: SHIPPED | OUTPATIENT
Start: 2020-06-09 | End: 2020-09-14

## 2020-06-09 RX ORDER — ROSUVASTATIN CALCIUM 40 MG/1
40 TABLET, COATED ORAL AT BEDTIME
Qty: 30 TABLET | Refills: 2 | Status: SHIPPED | OUTPATIENT
Start: 2020-06-09 | End: 2020-06-19

## 2020-06-09 RX ADMIN — CLOPIDOGREL BISULFATE 75 MG: 75 TABLET, FILM COATED ORAL at 08:02

## 2020-06-09 RX ADMIN — ASPIRIN 81 MG: 81 TABLET, DELAYED RELEASE ORAL at 08:02

## 2020-06-09 RX ADMIN — ONDANSETRON 4 MG: 4 TABLET, ORALLY DISINTEGRATING ORAL at 11:09

## 2020-06-09 RX ADMIN — ACETAMINOPHEN 650 MG: 325 TABLET, FILM COATED ORAL at 02:20

## 2020-06-09 RX ADMIN — OXYCODONE HYDROCHLORIDE 5 MG: 5 TABLET ORAL at 04:53

## 2020-06-09 RX ADMIN — ACETAMINOPHEN 650 MG: 325 TABLET, FILM COATED ORAL at 08:02

## 2020-06-09 RX ADMIN — OXYCODONE HYDROCHLORIDE 5 MG: 5 TABLET ORAL at 00:31

## 2020-06-09 RX ADMIN — OMEPRAZOLE 20 MG: 20 CAPSULE, DELAYED RELEASE ORAL at 06:30

## 2020-06-09 ASSESSMENT — ACTIVITIES OF DAILY LIVING (ADL)
TRANSFERRING: 0-->INDEPENDENT
RETIRED_EATING: 0-->INDEPENDENT
AMBULATION: 0-->INDEPENDENT
BATHING: 0-->INDEPENDENT
TOILETING: 0-->INDEPENDENT
ADLS_ACUITY_SCORE: 11
SWALLOWING: 0-->SWALLOWS FOODS/LIQUIDS WITHOUT DIFFICULTY
COGNITION: 0 - NO COGNITION ISSUES REPORTED
DRESS: 0-->INDEPENDENT
ADLS_ACUITY_SCORE: 11
ADLS_ACUITY_SCORE: 11
RETIRED_COMMUNICATION: 0-->UNDERSTANDS/COMMUNICATES WITHOUT DIFFICULTY
ADLS_ACUITY_SCORE: 12

## 2020-06-09 ASSESSMENT — MIFFLIN-ST. JEOR: SCORE: 1070.25

## 2020-06-09 NOTE — PROVIDER NOTIFICATION
Notified hospitalist of pts complaint of right eye pain, tearing and inablility to open it since surgery, instructed to have Zoran [place] see pt. Sawyer Gallardo to see pt now and examined, no corneal scratch noted per Mono. Eye ointment applied per orders. Will continue to monitor.

## 2020-06-09 NOTE — PROGRESS NOTES
"CR: Smoking cessation order received. Pt states she has quit in the past, but not for long. 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 Reports her support system is very limited as her  is now blind and \"Can't do anything else except drink and smoke.\" Reports all family members living with her smoke, in the home. Pt was able to identify a few places she could go to avoid the smoke, as well as a few routines she could change to try and avoid the normal pattern of smoking. States she started when she was 8 years old and has significantly reduced how much she smokes over the years.      06/09/20 1212   General Information   Patient is receptive to smoking cessation at this time Yes   Packs Per Day 0.5 PPD   Years Smoked (#) 51 yrs   Cigarette Pack Years 25.5   Stage of Behavior Change   Patient's Stage of Behavior Change Contemplation \"I might (within the next 60 days\"   Processes of Change   The following interventions were used (smoking cessation) Increase awareness of effects of smoking on health;Develop strategies to increase confidence to quit;Initiate pro/con list of reasons to quit;Problem-solve barriers to quitting;Identify healthy alternatives;Identify support system;Recognize rewards of value to him/her   Motivation to Quit Scale (1-10) 10   Confidence to Quit Scale (1-10) 7   Education/Recommendations   Education QUIT PLAN phone line   Total Evaluation Time   Total Evaluation Time (Minutes) 15     "

## 2020-06-09 NOTE — OP NOTE
Procedure Date: 06/08/2020      PREOPERATIVE DIAGNOSIS:  Symptomatic high-grade left carotid stenosis.      POSTOPERATIVE DIAGNOSIS:  Symptomatic high-grade left carotid stenosis.      PROCEDURES:   1.  Left carotid endarterectomy with distal facial vein patch angioplasty.   a.  Intraoperative shunting and EEG monitoring.      SURGEON:  Chadwick Lozano MD       :  Marleen Wright ([facility] resident).      ANESTHESIA:  General.      PREOPERATIVE MEDICATIONS:  Cleocin 900 mg IV.      INDICATIONS:  A 59-year-old patient with multiple vascular problems including a previous right carotid endarterectomy, has been followed for a moderate left carotid stenosis.  She had 3 episodes 10 days ago of loss of motor and sensory function to her right arm that responded within 5 minutes.  She came to the office and we noted a significant worsening of the left carotid stenosis going from less than 20% to now over 70%.  A moderate stable stenosis with known in the common carotid artery of her previous endarterectomy on the right.  We felt this was most likely symptomatic stenosis of the left and surgical repair was indicated and Plavix will be held this for 24 hours and she has had no recurrent neurological symptoms since her office visit 4 days ago.      OPERATIVE PROCEDURE:  The patient was brought to the operating room, induced under general anesthesia and oriented without difficulty.  With some difficulty a radial arterial line was placed on the right by Anesthesia staff as dictated.  Rolled towel was placed under her under neck.  Calf pneumatic compression boots were used, and pillows placed under her knees.  She was quite fatigued.  We did identify an external jugular vein on the surface and this was marked.  Left neck area was prepped and draped.  Timeout was called and sites were identified.      VASCULAR EXPOSURE:  A standard left carotid incision was made.  Dissection was carried down to the platysmus.   The mandibular cutaneous nerve was somewhat high on the patient and she had a slightly lower bifurcation- this did not need to be transected or mobilized.  We dissected free the external jugular vein and this branched into 2 separate channels and thus not large enough for us to use.  This was not divided.  Sternocleidomastoid muscle was retracted laterally.  We dissected down to the carotid sheath, which was opened.  We had excellent visualization of the ansa cervicalis and traced this up into the hypoglossal nerve.  Due to her lower bifurcation, minimal manipulation and mobilization was required on this.  Vagus nerve was also unremarkable.  We dissected down to the common carotid artery proximal to the plaque, which extended approximately 3 cm proximal to the bifurcation at which point the artery measured approximately 6 mm in diameter and was relatively free of disease.  Significant plaque in the external carotid artery and this was encircled with Silastic vessel loop.  We avoided the carotid bulb and proximal ICA due to her recent symptoms.  Distal ICA approximately 2 cm beyond this was free of disease measuring slightly more than 3 mm.  Silastic vessel loops were placed.      CAROTID ENDARTERECTOMY:  5000 units of intravenous heparin were given.  After 5 minutes, the vessel was sequentially tightened with a stable EEG.  A #11 blade was used to enter the common carotid.  Nicole scissors extended the arteriotomy at the distal ICA where there was very minimal disease but not completely disease free but no significant thickening.  She had calcified plaque but no obvious large ulceration would explain her recent symptoms.  A Sundt shunt was inserted and secured with vessel proximally and Vidal clamp distally with a very stable EEG.      ENDARTERECTOMY:  Distal endpoint in the ICA was made with a Bullitt blade with a very smooth transition being noted with essentially no step-off.  This was tacked down with several  interrupted 7-0 Prolene sutures very nicely. Endarterectomy was performed to the deep media with the aid of a spatula.  CC endpoint measured less than 1 mm in thickness and was a smooth transition.  A good eversion endarterectomy was performed on the external carotid artery, removing approximately 1 cm plaque and essentially all diseased with good back-bleeding.  All loose medial fibers were removed under loupe magnification to a very smooth plane.      DISTAL FACIAL VEIN PATCH:  Since the external jugular vein was not adequate.  We had previously dissected free the facial vein, ligating several branches between 7-0 Prolene suture and mobilized this until it branched.  We had previously ligated the origin with 3-0 silk suture and controlled with a padded bulldog.  We now ligated the feeding branches with 3-0 silk suture and transected this.  This was everted, left double thickness in the normal direction of flow.  This was then sewn from the distal endpoint in the ICA down to the carotid bifurcation with a gentle tapering with running 6-0 Prolene suture and loupe magnification.  The rest of the common carotid artery was closed primarily with running 6-0 Prolene suture.  Prior to complete closure, the shunt was removed and vessel was flushed and locked closure was completed.  Blood flow was sequentially allowed to go up the ICA.  An excellent pulse being noted.  A single 7-0 Prolene suture was used on the medial aspect on our vein patch for good hemostasis.  We had a strong distal pulse.      Total arteriotomy measured 5 cm in length with a vein patch measuring 2.5 cm.  A small amount of Surgicel was placed over this.  Neck was closed in layers with interrupted running 3-0 Vicryl suture, and skin was closed with 4-0 Monocryl in subcuticular fashion.  The patient was given Toradol 15 mg IV along with a field block of 0.5% Marcaine.  Gauze and sterile dressing applied.      The patient tolerated the procedure well.       ESTIMATED BLOOD LOSS:  Less than 20 mL.      COMPLICATIONS:  None.      She was alert, oriented x 3 and neurologically intact in recovery.         OSMAN URIOSTEGUI MD             D: 2020   T: 2020   MT: TIERNEY      Name:     KASIA WAN   MRN:      [id number]        Account:        [id number]   :      1960           Procedure Date: 2020      Document: [id number]

## 2020-06-09 NOTE — ANESTHESIA POSTPROCEDURE EVALUATION
Patient: Shirley Hendricks    Procedure(s):  LEFT CAROTID ENDARTERECTOMY with distal facal vein patch  and EEG    Diagnosis:Symptomatic carotid artery stenosis [I65.29]  Diagnosis Additional Information: No value filed.    Anesthesia Type:  General    Note:  Anesthesia Post Evaluation    Patient location during evaluation: PACU  Patient participation: Able to fully participate in evaluation  Level of consciousness: awake and alert  Pain management: adequate  Airway patency: patent  Cardiovascular status: acceptable  Respiratory status: acceptable  Hydration status: acceptable  PONV: none     Anesthetic complications: None          Last vitals:  Vitals:    06/08/20 1919 06/08/20 1930 06/08/20 1936   BP:  121/65 121/65   Pulse:  50    Resp:  11 9   Temp:      SpO2: 95% 97% 99%         Electronically Signed By: Randal Garcia MD  June 8, 2020  7:43 PM

## 2020-06-09 NOTE — PROGRESS NOTES
[facility]  Vascular Medicine Progress Note       Attestation: I have examined the patient independently of Ailin Nichols PA-C and agree with the examination and plan as delineated below.    Dimitry Alvarez MD         Assessment and Plan:      1. Symptomatic high grade left carotid artery stenosis undergoing a left carotid endarterectomy 6/8/20     -Doing well POD1 but having headache which is getting better as morning progresses. BP not running high. Neurologically intact. Nausea after narcotic earlier this AM.  -Resume DAP.  -Has moderate asymptomatic KIERA restenosis to 60% post Rt CEA in the past. Outpatient surveillance.   -Home today.      2. Hyperlipidemia    -LDL of 74, HDL 45, triglycerides 85, and total cholesterol 136 while on maximum dose Atorvastatin 80 mg daily.   -Given progression carotid disease, switched to Crestor 40 mg daily. She should then have advanced lipid testing checked in 3 months through me at [facility]. If LDL still not at goal, will add Zetia 10 mg daily at that time.      3. Peripheral arterial disease with patent aortobifemoral bypass graft and left femoral-popliteal bypass graft     -Has chronic but not disabling claudication symptoms worse in her right leg due to her known SFA occlusion. Continue medical management. Intensify her statin. Discuss changing DAP to ASA 81 mg w/ Xarelto 2.5 mg PO BID when seen by me in one month per ALLYN and CHIP-PAD trials.     4. Hypertension     -Controlled.  -Continue PTA amlodipine, carvedilol, and lisinopril.      5. Ongoing tobacco use     -Told she can keep legs or cigarettes, not both.  -She has nicotine patches at home that she states she will start. Advised that she start with 14 mg patches and continue for 30 days followed by 7 mg patches for 30 days and then stop.                    Interval History:   doing well; no cp, sob, n/v/d, or abd pain. Having headache, but not disabling. Feels she can go home. Eating,  ambulating, voiding.              Review of Systems:   The 10 point Review of Systems is negative other than noted in the HPI             Medications:       acetaminophen  650 mg Oral Q6H     amLODIPine  5 mg Oral QAM     aspirin  81 mg Oral Daily     carvedilol  6.25 mg Oral BID w/meals     clopidogrel  75 mg Oral Daily     erythromycin   Right Eye 4x Daily     fluticasone-vilanterol  1 puff Inhalation Daily     lisinopril  20 mg Oral BID     nicotine   Transdermal Q8H     nitroGLYcerin  0.4 mg Sublingual See Admin Instructions     omeprazole  20 mg Oral QAM AC     rosuvastatin  40 mg Oral At Bedtime     sodium chloride (PF)  3 mL Intracatheter Q8H                  Physical Exam:     Patient Vitals for the past 24 hrs:   BP Temp Temp src Pulse Heart Rate Resp SpO2 Height Weight   06/09/20 0746 108/59 98.3  F (36.8  C) Oral -- 53 16 95 % -- --   06/09/20 0600 132/82 -- -- 50 51 14 97 % -- --   06/09/20 0500 -- 97.6  F (36.4  C) Axillary -- -- -- -- -- 55.2 kg (121 lb 11.1 oz)   06/09/20 0400 129/58 -- -- 50 52 14 91 % -- --   06/09/20 0300 -- -- -- -- -- 16 -- -- --   06/09/20 0220 -- -- -- -- -- 15 -- -- --   06/09/20 0200 112/74 -- -- 50 50 15 94 % -- --   06/09/20 0115 -- -- -- -- -- -- 94 % -- --   06/09/20 0031 -- 98  F (36.7  C) Oral -- -- 16 -- -- --   06/09/20 0000 112/60 -- -- 51 50 14 98 % -- --   06/08/20 2300 100/51 -- -- 51 (!) 49 14 -- -- --   06/08/20 2200 107/63 -- -- (!) 49 50 16 96 % -- --   06/08/20 2100 116/61 -- -- 50 50 14 -- -- --   06/08/20 2024 -- 97.9  F (36.6  C) Oral -- -- 16 99 % -- --   06/08/20 2000 115/70 -- -- 61 51 13 96 % -- --   06/08/20 1936 121/65 -- -- -- 58 9 99 % -- --   06/08/20 1930 121/65 -- -- 50 (!) 49 11 97 % -- --   06/08/20 1919 -- -- -- -- -- -- 95 % -- --   06/08/20 1915 129/68 96.4  F (35.8  C) Oral 53 -- 12 -- -- --   06/08/20 1854 -- -- -- -- -- -- 94 % -- --   06/08/20 1850 117/59 96.4  F (35.8  C) Temporal (!) 49 52 14 94 % -- --   06/08/20 1820 123/74 96.8  F  "(36  C) Temporal 56 52 13 94 % -- --   06/08/20 1810 138/76 -- -- 50 50 13 98 % -- --   06/08/20 1800 127/68 -- -- 52 52 13 99 % -- --   06/08/20 1750 121/77 97.4  F (36.3  C) Temporal 58 58 12 99 % -- --   06/08/20 1740 127/65 -- -- -- 75 15 99 % -- --   06/08/20 1259 (!) 180/97 97  F (36.1  C) Temporal -- 56 16 96 % 1.575 m (5' 2\") 55.5 kg (122 lb 6.4 oz)     Wt Readings from Last 4 Encounters:   06/09/20 55.2 kg (121 lb 11.1 oz)   03/12/20 57.6 kg (127 lb)   03/02/20 57.3 kg (126 lb 6.4 oz)   02/26/20 59 kg (130 lb)       Intake/Output Summary (Last 24 hours) at 6/9/2020 0805  Last data filed at 6/9/2020 0030  Gross per 24 hour   Intake 2000 ml   Output 325 ml   Net 1675 ml     Constitutional: normal  Eyes: normal  ENT: normal  Left Carotid incision is c/d/i.  Hematologic / Lymphatic: normal  Back: normal  Lungs: No increased work of breathing, good air exchange, clear to auscultation bilaterally, no crackles or wheezing.  Cardiovascular: Regular rate and rhythm, normal S1 and S2, no S3 or S4, and no murmur noted.  Abdomen: normal  Musculoskeletal: No redness, warmth, or swelling of the joints.  Full range of motion noted.  Motor strength is 5 out of 5 all extremities bilaterally.  Tone is normal.  Neurologic: Awake, alert, oriented to name, place and time.  Cranial nerves II-XII are grossly intact.  Motor is 5 out of 5 bilaterally.  Cerebellar finger to nose, heel to shin intact.  Sensory is intact.  Babinski down going, Romberg negative, and gait is normal.  Neuropsychiatric: normal           Data:     Results for orders placed or performed during the hospital encounter of 06/08/20 (from the past 24 hour(s))   EKG 12-lead, tracing only   Result Value Ref Range    Interpretation ECG Click View Image link to view waveform and result    Basic metabolic panel   Result Value Ref Range    Sodium 137 133 - 144 mmol/L    Potassium 4.1 3.4 - 5.3 mmol/L    Chloride 105 94 - 109 mmol/L    Carbon Dioxide 25 20 - 32 mmol/L "    Anion Gap 7 3 - 14 mmol/L    Glucose 85 70 - 99 mg/dL    Urea Nitrogen 9 7 - 30 mg/dL    Creatinine 0.56 0.52 - 1.04 mg/dL    GFR Estimate >90 >60 mL/min/[1.73_m2]    GFR Estimate If Black >90 >60 mL/min/[1.73_m2]    Calcium 9.0 8.5 - 10.1 mg/dL   CBC with platelets differential   Result Value Ref Range    WBC 5.1 4.0 - 11.0 10e9/L    RBC Count 4.49 3.8 - 5.2 10e12/L    Hemoglobin 14.1 11.7 - 15.7 g/dL    Hematocrit 42.0 35.0 - 47.0 %    MCV 94 78 - 100 fl    MCH 31.4 26.5 - 33.0 pg    MCHC 33.6 31.5 - 36.5 g/dL    RDW 13.5 10.0 - 15.0 %    Platelet Count 149 (L) 150 - 450 10e9/L    Diff Method Automated Method     % Neutrophils 63.3 %    % Lymphocytes 26.8 %    % Monocytes 6.1 %    % Eosinophils 2.4 %    % Basophils 1.2 %    % Immature Granulocytes 0.2 %    Nucleated RBCs 0 0 /100    Absolute Neutrophil 3.2 1.6 - 8.3 10e9/L    Absolute Lymphocytes 1.4 0.8 - 5.3 10e9/L    Absolute Monocytes 0.3 0.0 - 1.3 10e9/L    Absolute Eosinophils 0.1 0.0 - 0.7 10e9/L    Absolute Basophils 0.1 0.0 - 0.2 10e9/L    Abs Immature Granulocytes 0.0 0 - 0.4 10e9/L    Absolute Nucleated RBC 0.0    Lipid panel   Result Value Ref Range    Cholesterol 136 <200 mg/dL    Triglycerides 85 <150 mg/dL    HDL Cholesterol 45 (L) >49 mg/dL    LDL Cholesterol Calculated 74 <100 mg/dL    Non HDL Cholesterol 91 <130 mg/dL   Hemoglobin A1c   Result Value Ref Range    Hemoglobin A1C 5.5 0 - 5.6 %   Hospitalist IP Consult: Patient to be seen: Routine - within 24 hours; Vascular Medicine cross coverage. Thanks!; Consultant may enter orders: Yes; Requesting provider? Other supervising provider; Name: Kathy Wilkins PA-C     6/8/2020  7:49 PM  HOSPITALIST CONSULT CHART CHECK:     Hospitalist service was consulted for cross coverage only. We   will peripherally follow and chart check throughout the week.      - For vascular medical concerns during business hours M-F, call   the [facility] at [phone]  to have the   rounding/on call Vascular Medicine (NOT VASCULAR SURGERY) MD   paged.     - After business hours M-F, for medical concerns on this patient,   please page hospitalist staff.     - For vascular surgical questions, please page the appropriate   surgeon (primary vascular surgeon or on call vascular surgeon)   based upon the time of day.            Glucose by meter   Result Value Ref Range    Glucose 85 70 - 99 mg/dL   Basic metabolic panel   Result Value Ref Range    Sodium 131 (L) 133 - 144 mmol/L    Potassium 4.2 3.4 - 5.3 mmol/L    Chloride 101 94 - 109 mmol/L    Carbon Dioxide 25 20 - 32 mmol/L    Anion Gap 5 3 - 14 mmol/L    Glucose 89 70 - 99 mg/dL    Urea Nitrogen 11 7 - 30 mg/dL    Creatinine 0.54 0.52 - 1.04 mg/dL    GFR Estimate >90 >60 mL/min/[1.73_m2]    GFR Estimate If Black >90 >60 mL/min/[1.73_m2]    Calcium 8.3 (L) 8.5 - 10.1 mg/dL   CBC with platelets   Result Value Ref Range    WBC 6.8 4.0 - 11.0 10e9/L    RBC Count 3.83 3.8 - 5.2 10e12/L    Hemoglobin 11.9 11.7 - 15.7 g/dL    Hematocrit 36.1 35.0 - 47.0 %    MCV 94 78 - 100 fl    MCH 31.1 26.5 - 33.0 pg    MCHC 33.0 31.5 - 36.5 g/dL    RDW 13.7 10.0 - 15.0 %    Platelet Count 133 (L) 150 - 450 10e9/L

## 2020-06-09 NOTE — DISCHARGE SUMMARY
Vascular Surgery Service Discharge Summary:     NAME: Shirley Hendricks   MRN: [id number]   : 1960   [place]    DATE OF ADMISSION: 2020     PRE/POSTOPERATIVE DIAGNOSES:    Symptomatic high-grade left carotid stenosis.     PROCEDURES PERFORMED, 2020:   Left carotid endarterectomy with distal facial vein patch angioplasty.     INTRAOPERATIVE FINDINGS: calcified plaque without ulceration     POSTOPERATIVE COMPLICATIONS: None    DATE OF DISCHARGE: 2020    ADMISSION HPI (from 2020): 59-year-old patient with multiple vascular problems including a previous right carotid endarterectomy, has been followed for a moderate left carotid stenosis.  She had 3 episodes 10 days ago of loss of motor and sensory function to her right arm that responded within 5 minutes.  She came to the office and we noted a significant worsening of the left carotid stenosis going from less than 20% to now over 70%.  A moderate stable stenosis with known in the common carotid artery of her previous endarterectomy on the right.  We felt this was most likely symptomatic stenosis of the left and surgical repair was indicated.    HOSPITAL COURSE: Shirley Hendricks is a 59 year old female who was admitted on 2020 and underwent the above-named procedures.  She tolerated the procedure well and postoperatively was transferred to the general post-surgical unit.  Her blood pressure was well-controlled overnight.  She was assessed by the hospitalist for R eye pain and was started on drops. She was able to performed normal ADLs in the morning and was discharged home on POD1.      Physical Exam:  Constitutional: healthy, alert, no acute distress and cooperative   Cardiovascular: RRR   Respiratory: breathing unlabored without secondary muscle use  Psychiatric: mentation appears normal and affect normal/bright  Neck: L neck incision C/D/I, mild swelling +ecchymosis  MSK: able to move all extremities without weakness or  ataxia  Extremities: no open lesions, extremities warm and well perfused   Neurology: no motor/sensory deficits, CN intact    PAST MEDICAL HISTORY:  Past Medical History:   Diagnosis Date     Anxiety 12/7/2017     COPD (chronic obstructive pulmonary disease) (H)      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 clot     Post-menopausal      Reflux esophagitis 2/04    EGD: esophagitis and medium HH     Uncomplicated asthma      Vitamin C deficiency 8/12/2018       PAST SURGICAL HISTORY:  Past Surgical History:   Procedure Laterality Date     ANGIOGRAM       C FABRIC WRAPPING OF ABDOMINAL ANEURYSM       C NONSPECIFIC PROCEDURE  12/00    angioplasty with stent right fem. a.     C NONSPECIFIC PROCEDURE  1987    sinus surgery     C NONSPECIFIC PROCEDURE  9/2/2009    Emergent left groin exploration with oversewing of bleeding angiographic site. 2. Endarterectomy of common femoral-proximal superficial femoral artery with greater saphenous vein patch angioplasty.   a. Lund of accessory right greater saphenous vein.      C NONSPECIFIC PROCEDURE  8/27/2009    occluded left common iliac and external iliac arteries were successfully revascularized with stenting to 8 and 7 mm      CARDIAC CATHERIZATION  9/3/2009    multivessel CAD without target lesions, med mgmt indicated, preserved ef     ENDARTERECTOMY CAROTID Right 5/11/2016    Procedure:  ENDARTERECTOMY CAROTID;  Surgeon: Chadwick Lozano MD;  Location:  OR     GYN SURGERY  left tube    left salpingectomy     LAPAROSCOPIC CHOLECYSTECTOMY N/A 9/27/2017    Procedure: LAPAROSCOPIC CHOLECYSTECTOMY;  LAPAROSCOPIC CHOLECYSTECTOMY;  Surgeon: Jacoby Tapia MD;  Location: [facility]     ORTHOPEDIC SURGERY      left knee surgery     VASCULAR SURGERY  aoto bi fem  left fem-AK pop         Labs:  Recent Labs   Lab Test 06/08/20  1302 02/16/20  1824   WBC 5.1 6.0   RBC 4.49 4.29   HGB 14.1 13.3   HCT 42.0 40.1   MCV 94 94   MCH 31.4 31.0   MCHC 33.6 33.2   * 176     Recent Labs   Lab Test 06/08/20  1302 02/16/20  1824 09/25/19  0909   POTASSIUM 4.1 3.9 4.2   CHLORIDE 105 105 104   BUN 9 12 9       DISCHARGE INSTRUCTIONS:  Discharge Orders      Reason for your hospital stay    You had a repair of your left carotid artery.     Follow-up and recommended labs and tests     Follow up with Dr. Lozano, at the vascular surgery clinic, within 2 weeks to evaluate after surgery.     Activity    Your activity upon discharge: activity as tolerated     When to contact your care team    Call the office if you have any of the following: severe pain, excessive bleeding, expanding hematoma, weakness/numbness in your extremity, slurring of speech, facial droop, severe headache.     Wound care and dressings    Instructions to care for your wound at home: keep wound clean and dry.    Okay to shower and pat your incision dry. No swimming or tub baths until the incision heals.     Diet    Follow this diet upon discharge: Regular Diet Adult     Discharge Medications   Current Discharge Medication List      START taking these medications    Details   artificial tears OINT ophthalmic ointment Apply to eyes every 4 hours as needed for comfort.  Qty: 1 g, Refills: 1    Associated Diagnoses: Abrasion of right cornea, initial encounter      diclofenac (VOLTAREN) 0.1 % ophthalmic solution Place 2 drops into the right eye  every 6 hours as needed for moderate pain  Qty: 2.5 mL, Refills: 1    Associated Diagnoses: Abrasion of right cornea, initial encounter      erythromycin (ROMYCIN) 5 MG/GM ophthalmic ointment Place into the right eye 4 times daily  Qty: 1 g, Refills: 1    Associated Diagnoses: Abrasion of right cornea, initial encounter      oxyCODONE (ROXICODONE) 5 MG tablet Take 1 tablet (5 mg) by mouth every 3 hours as needed for moderate to severe pain  Qty: 10 tablet, Refills: 0    Associated Diagnoses: Postoperative pain; S/P carotid endarterectomy      rosuvastatin (CRESTOR) 40 MG tablet Take 1 tablet (40 mg) by mouth At Bedtime  Qty: 30 tablet, Refills: 2    Associated Diagnoses: Hyperlipidemia LDL goal <70         CONTINUE these medications which have NOT CHANGED    Details   amLODIPine (NORVASC) 5 MG tablet Take 5 mg by mouth every morning      Acetaminophen (TYLENOL PO) Take 1,000-1,500 mg by mouth every 6 hours as needed for mild pain or fever       ASPIRIN EC PO Take 81 mg by mouth daily      BREO ELLIPTA 200-25 MCG/INH Inhaler Inhale 1 puff into the lungs daily INDICATION: COPD CONTROLLER  Qty: 1 Inhaler, Refills: 4    Associated Diagnoses: Chronic obstructive pulmonary disease, unspecified COPD type (H); Tobacco use disorder      CALCIUM 600-D 600-400 MG-UNIT TABS Take 1 tablet by mouth 2 times daily  Qty: 180 tablet, Refills: 2    Associated Diagnoses: Osteoporosis, unspecified osteoporosis type, unspecified pathological fracture presence      carvedilol 6.25 MG PO tablet Take 1 tablet (6.25 mg) by mouth 2 times daily (with meals)  Qty: 180 tablet, Refills: 3    Associated Diagnoses: Essential hypertension      clopidogrel (PLAVIX) 75 MG tablet Take 1 tablet (75 mg) by mouth daily  Qty: 90 tablet, Refills: 3    Associated Diagnoses: Peripheral vascular disease (H)      denosumab (PROLIA) 60 MG/ML SOLN injection Inject 1 mL (60 mg) Subcutaneous every 6 months INDICATION: TO TREAT OSTEOPOROSIS  Qty: 1 Syringe, Refills:  0    Associated Diagnoses: Osteoporosis without current pathological fracture, unspecified osteoporosis type      lisinopril (ZESTRIL) 20 MG tablet Take 1 tablet (20 mg) by mouth 2 times daily  Qty: 180 tablet, Refills: 2    Associated Diagnoses: Essential hypertension      nitroGLYcerin (NITROSTAT) 0.4 MG sublingual tablet Place 1 tablet (0.4 mg) under the tongue See Admin Instructions for chest pain  Qty: 30 tablet, Refills: 11    Associated Diagnoses: Coronary artery disease involving native coronary artery of native heart without angina pectoris      omeprazole (PRILOSEC) 20 MG DR capsule TAKE ONE CAPSULE BY MOUTH DAILY AS NEEDED  Qty: 90 capsule, Refills: 0    Associated Diagnoses: Reflux esophagitis         STOP taking these medications       atorvastatin (LIPITOR) 80 MG tablet Comments:   Reason for Stopping:             Allergies   Allergies   Allergen Reactions     Contrast Dye Anaphylaxis     RASH, FACIAL AND NECK SWELLING, SOB, WHEEZING     Pantoprazole      Protonix caused diffuse edema     Chantix [Varenicline]      Terrible dreams     Penicillins Itching          Kaitlin Oates MD   Vascular Surgery Fellow  [facility]     STAFF: Agree.  Resume chronic aspirin Plavix.  Changed to Crestor.  Definitely needs to quit smoking completely and this was discussed again with progression of vascular disease not and only in the left carotid but also with a previous right CEA common carotid artery.  Eventually may require right femoral to popliteal bypass graft if her right calf claudication symptoms worsen when she is recovered from the surgery.       Chadwick Lozano MD

## 2020-06-09 NOTE — PROGRESS NOTES
[facility]    House [facility] Post-op Eye Pain Note  6/8/2020   Time Called: 2036    House BR[facility] called for: Post-op Eye Pain.    Assessment & Plan     Post-op R eye pain secondary to corneal desiccation:  Post-op right eye and foreign body sensation  -Proparacaine ophthalmic (0.5%) three drops.  Followed by Fluorescin eye exam to confirm desiccation versus foreign body.      INTERVENTIONS:  - Proparacaine/Alcaine ophthalmic solution (0.5%), 3 drops prior to exam (initial rapid onset pain control/anesthesia needed to facilitate exam).  - Fluorescein dye application with NS from dye impregnated filter paper strip, (pt informed possible yellow-orange coloration nasal secretions in next day)  - Erythromycin ophthalmic ointment (1.25 cm ribbon) now and QID.  D/C after HS dose day of sx resolution.   - Diclofenac/Voltaren ophthalmic gtts prn Q 4 hours for eye pain.    - Hypromellarose/ArtificialTears ointment QID PRN, may continue after symptom resolution.    -House Provider F/U in 24 hrs and PRN: Increasing/worsening symptoms, continued symptoms after 48 hrs or concerns/questions.   - No contact lenses until first day after sx resolution.  - After discharge from hospital advised patient to call PMD/ophthalmology.    Discussed with and defer further cares to nursing and primary team     Interval History     Shirley Hendricks is a 59 year old female who was admitted on 6/8/2020 for planned L CEA.    Medical history significant for: High grade L carotid artery stenosis, HLP, PAD with bypass grafting, HTN, tobacco use disorder     Code Status: Full Code    Allergies   Allergies   Allergen Reactions     Contrast Dye Anaphylaxis     RASH, FACIAL AND NECK SWELLING, SOB, WHEEZING     Pantoprazole      Protonix caused diffuse edema     Chantix [Varenicline]      Terrible dreams     Penicillins Itching       Physical Exam   Vital Signs with Ranges:  Temp:  [96.4  F (35.8  C)-97.9  F (36.6  C)] 97.9  F (36.6   C)  Pulse:  [49-61] 61  Heart Rate:  [49-75] 51  Resp:  [9-16] 16  BP: (115-180)/(59-97) 115/70  MAP:  [82 mmHg-106 mmHg] 83 mmHg  Arterial Line BP: (136-164)/(54-66) 136/54  SpO2:  [94 %-99 %] 99 %  No intake/output data recorded.    Constitutional: Pt lying in bed, eyes closed  EYE:  Tamanna-orb    Ecchymoses No   Erythema Yes   Swelling  Yes   Vision    Visual acuity grossly preserved Yes   Both eyes open   Yes   Lid    Cassandra Yes   Atraumatic Yes   No erythema Yes   Ecchymoses No   Swelling  No   Sclera    Normal Yes   White Yes   Non-icteric  Yes   EOM    Normal Yes   EOMI Yes   nystagmus  No   Pupil     PERRL Yes   Bilat Yes   Brisk Yes   Direct Yes   Consensual Yes   Cornea   Fluorescein dye Wood's lamp dye staining   faint  horizontal  3-4 mm wide  and 3-7 o'clock   Cross pupil/visual axis No     Time Spent on this Encounter   I spent 10 minutes on the unit/floor managing the care of Shirley Hendricks. Over 50% of my time was spent counseling the patient and/or coordinating care regarding services listed in this note.    NOELLE Nunes [facility]

## 2020-06-09 NOTE — PROGRESS NOTES
HOSPITALIST CONSULT CHART CHECK:    Hospitalist service was consulted for after hours cross coverage only. We will peripherally follow and chart check.     - For medical concerns during business hours, call the [facility] at [phone] to have the rounding/on call Vascular Medicine (NOT VASCULAR SURGERY) MD paged.    - After business hours, for medical concerns on this patient, please page Hospitalist staff.    - For vascular surgical questions, please page the appropriate surgeon (primary vascular surgeon or on call vascular surgeon) based upon the time of day.       Shari Martinez PA-C

## 2020-06-09 NOTE — CONSULTS
HOSPITALIST CONSULT CHART CHECK:     Hospitalist service was consulted for cross coverage only. We will peripherally follow and chart check throughout the week.      - For vascular medical concerns during business hours M-F, call the [facility] at [phone] to have the rounding/on call Vascular Medicine (NOT VASCULAR SURGERY) MD paged.     - After business hours M-F, for medical concerns on this patient, please page hospitalist staff.     - For vascular surgical questions, please page the appropriate surgeon (primary vascular surgeon or on call vascular surgeon) based upon the time of day.

## 2020-06-09 NOTE — PLAN OF CARE
VSS thoughruns tachy low 50's sometimes dipping into high 40's. Weaned 02 down to 1 LPM from 4 L when ATF at 1900 last evening.Scheduled tylenol and 5 mg Oxy effective for incisional neck pain and headache. Up to BC x1 to void at Midnight.Frustrated vs irritable at times re: delay in treatment Re: eye issue, getting Nicotine path placed. Reassurance provided.

## 2020-06-09 NOTE — PLAN OF CARE
A/O x4. VSS. Pain managed with oxycodone. Discussed discharge instructions and medications with pt , verbalized understanding. Pt discharged to home with belongings and medications. Transportation provided by sons significant other.

## 2020-06-09 NOTE — DISCHARGE INSTRUCTIONS
Carotid Endartectomy  Post-Operative Instructions    Typical length of stay in the hospital is 1-2 days.  On occasion, an evening in the Intensive Care Unit may be required for blood pressure regulation.    Activity    Most people are able to resume normal activities 1-2 weeks after surgery.  The key is to gradually increase your level of activity as you are able.  It is not uncommon to feel easily fatigued - this will improve with time.      You should avoid heavy lifting more than 30 lbs for 1 week.      You may return to work when you feel able - typically 1-2 weeks after surgery, depending on the type of work you do.      You should not drive a car for 1 week after surgery.  In addition,  you can not be taking prescription strength pain medication and you must feel strong enough to drive safely.    Incision Care    You can shower or bathe 2 days after surgery - avoid rubbing and soaking your incision.      You may have strips of white tape called  steri-strips  across your incision; they should stay on for 5-7 days or until the ends start to curl up.  You can then remove the steri-strips, or we will remove them at your post-operative appointment.      Avoid shaving directly over the incision for 2-3 weeks.    Common Concerns    You may notice mild skin numbness on the side of your surgery in your neck, chin, face, and earlobe.  This is due to nerve  irritation  and will gradually resolve over the next several months.  Call our office if you experience any short-lasting episode of numbness or weakness affecting one side of your body.      Some bruising and firmness around you incision can be expected.  This will soften and resolve over the next few weeks.  You may notice that some discoloration spreads to an area lower than the incision, such as the shoulder, neck or upper chest.  Likewise, swelling can occur near the incision or below the chin.  Call our office if the swelling or bruising worsens, or if you have  difficulty swallowing.    Diet    You may resume a regular diet before you leave the hospital.  You may find that it is best to try smaller, more frequent meals until your appetite returns.    Medications    You may be started on a blood thinner after surgery - typically Aspirin and / or Plavix.      You may be given a prescription for pain medication after surgery.  If you need to have this refilled, please call your pharmacy where you had it filled.  They will then call us for approval.  Please do not call after hours for a refill on pain medication.    Post-Operative Appointments    You need to see your surgeon in the clinic approximately 1-2 weeks after surgery.  Post-operative appointment:   Date: _____________________________  Time: ____________________________  Dr. ______________________________      You will have an ultrasound test and evaluation with your surgeon 90 days (3 months) after surgery.      We will notify you by mail when you are due to schedule further ultrasound testing and evaluation; typically this is done annually.     When You Should Call Our Office    Body aches, chills or temperature greater than 101      Incision redness or drainage      Loss of vision in one eye      Loss of strength / weakness in one side of your body      Severe headache      Difficulty with speech      If you will be having an invasive procedure, such as a colonoscopy or dental work within one year of your surgery, you may need to take an antibiotic prior to the procedure if you had a Dacron patch with your surgery; please call us so we can assist you with this.    Call our main number, [phone], for assistance with any concerns or questions.     Revised 5/2014

## 2020-06-09 NOTE — PROGRESS NOTES
Vascular Surgery Progress Note:  POD#1   Left CEA    S: Comfortable through night with minimal neck discomfort.  However this morning noticed pounding nonlocalized frontal headache. (Normal blood pressure).  Much better with oxycodone.    O:   Vitals:  BP  Min: 100/51  Max: 180/97  Temp  Av.2  F (36.2  C)  Min: 96.4  F (35.8  C)  Max: 98  F (36.7  C)  Pulse  Av.1  Min: 49  Max: 61  I/O last 3 completed shifts:  In:  [P.O.:200; I.V.:1800]  Out: 25 [Blood:25]    Physical Exam: Alert and appropriate.  Very comfortable at this time.                            Dressings removed left neck.  No hematoma.                              Mild ecchymosis related to Plavix.                            CN XII and neuro=A                             +3 right femoral and left in situ graft pulse.         Admit laboratory:  SCr= 0.56   A1c= 5.5  LDL= 74    Assessment/Plan: #1.  Doing well following left CEA for 3 episodes of right arm TIA.  Headache this morning and will need to watch.  Not associate with hypertension.  Treated with oxycodone and she will go home with several of these.  Restart her chronic aspirin and Plavix.  Suspect home this morning if headache improves.                                  #2.  Progression of her pan systemic vascular issues.  Will eventually likely need right femoral to popliteal bypass graft since she has increasing claudication symptoms in her right leg.  Also some increase in the stenosis of the right common carotid artery but not clinically significant.  Seen by Dr. Alvarez who recommended switching over to Crestor 40 mg daily to try to maximize her LDL treatment.                                     #3.   Still smoking 1/2 pack of cigarettes a day.  Needs to definitely quit.  Discussed with patient.    Wm. Blake MD

## 2020-06-10 ENCOUNTER — TELEPHONE (OUTPATIENT)
Dept: INTERNAL MEDICINE | Facility: CLINIC | Age: 60
End: 2020-06-10

## 2020-06-10 ENCOUNTER — APPOINTMENT (OUTPATIENT)
Dept: CT IMAGING | Facility: CLINIC | Age: 60
End: 2020-06-10
Attending: INTERNAL MEDICINE
Payer: COMMERCIAL

## 2020-06-10 ENCOUNTER — HOSPITAL ENCOUNTER (OUTPATIENT)
Facility: CLINIC | Age: 60
Setting detail: OBSERVATION
Discharge: HOME | End: 2020-06-11
Attending: EMERGENCY MEDICINE | Admitting: INTERNAL MEDICINE
Payer: COMMERCIAL

## 2020-06-10 ENCOUNTER — TELEPHONE (OUTPATIENT)
Dept: OTHER | Facility: CLINIC | Age: 60
End: 2020-06-10

## 2020-06-10 DIAGNOSIS — K59.00 CONSTIPATION, UNSPECIFIED CONSTIPATION TYPE: Primary | ICD-10-CM

## 2020-06-10 DIAGNOSIS — R11.2 NON-INTRACTABLE VOMITING WITH NAUSEA, UNSPECIFIED VOMITING TYPE: ICD-10-CM

## 2020-06-10 DIAGNOSIS — R11.2 INTRACTABLE VOMITING WITH NAUSEA, UNSPECIFIED VOMITING TYPE: ICD-10-CM

## 2020-06-10 LAB
ALBUMIN SERPL-MCNC: 3.6 G/DL (ref 3.4–5)
ALP SERPL-CCNC: 82 U/L (ref 40–150)
ALT SERPL W P-5'-P-CCNC: 30 U/L (ref 0–50)
ANION GAP SERPL CALCULATED.3IONS-SCNC: 11 MMOL/L (ref 3–14)
AST SERPL W P-5'-P-CCNC: 21 U/L (ref 0–45)
BASOPHILS # BLD AUTO: 0 10E9/L (ref 0–0.2)
BASOPHILS NFR BLD AUTO: 0.4 %
BILIRUB DIRECT SERPL-MCNC: 0.1 MG/DL (ref 0–0.2)
BILIRUB SERPL-MCNC: 0.5 MG/DL (ref 0.2–1.3)
BUN SERPL-MCNC: 12 MG/DL (ref 7–30)
CALCIUM SERPL-MCNC: 9.3 MG/DL (ref 8.5–10.1)
CHLORIDE SERPL-SCNC: 98 MMOL/L (ref 94–109)
CO2 SERPL-SCNC: 24 MMOL/L (ref 20–32)
CREAT SERPL-MCNC: 0.52 MG/DL (ref 0.52–1.04)
DIFFERENTIAL METHOD BLD: NORMAL
EOSINOPHIL # BLD AUTO: 0.1 10E9/L (ref 0–0.7)
EOSINOPHIL NFR BLD AUTO: 1.6 %
ERYTHROCYTE [DISTWIDTH] IN BLOOD BY AUTOMATED COUNT: 13.2 % (ref 10–15)
GFR SERPL CREATININE-BSD FRML MDRD: >90 ML/MIN/{1.73_M2}
GLUCOSE SERPL-MCNC: 72 MG/DL (ref 70–99)
HCT VFR BLD AUTO: 43.5 % (ref 35–47)
HGB BLD-MCNC: 14.9 G/DL (ref 11.7–15.7)
IMM GRANULOCYTES # BLD: 0 10E9/L (ref 0–0.4)
IMM GRANULOCYTES NFR BLD: 0.1 %
INTERPRETATION ECG - MUSE: NORMAL
LIPASE SERPL-CCNC: 63 U/L (ref 73–393)
LYMPHOCYTES # BLD AUTO: 0.8 10E9/L (ref 0.8–5.3)
LYMPHOCYTES NFR BLD AUTO: 9 %
MCH RBC QN AUTO: 32 PG (ref 26.5–33)
MCHC RBC AUTO-ENTMCNC: 34.3 G/DL (ref 31.5–36.5)
MCV RBC AUTO: 93 FL (ref 78–100)
MONOCYTES # BLD AUTO: 0.4 10E9/L (ref 0–1.3)
MONOCYTES NFR BLD AUTO: 5.1 %
NEUTROPHILS # BLD AUTO: 7 10E9/L (ref 1.6–8.3)
NEUTROPHILS NFR BLD AUTO: 83.8 %
NRBC # BLD AUTO: 0 10*3/UL
NRBC BLD AUTO-RTO: 0 /100
PLATELET # BLD AUTO: 198 10E9/L (ref 150–450)
POTASSIUM SERPL-SCNC: 4.2 MMOL/L (ref 3.4–5.3)
PROT SERPL-MCNC: 7.9 G/DL (ref 6.8–8.8)
RBC # BLD AUTO: 4.66 10E12/L (ref 3.8–5.2)
SARS-COV-2 RNA SPEC QL NAA+PROBE: NORMAL
SODIUM SERPL-SCNC: 133 MMOL/L (ref 133–144)
SPECIMEN SOURCE: NORMAL
TROPONIN I SERPL-MCNC: <0.015 UG/L (ref 0–0.04)
WBC # BLD AUTO: 8.3 10E9/L (ref 4–11)

## 2020-06-10 PROCEDURE — 84484 ASSAY OF TROPONIN QUANT: CPT | Performed by: EMERGENCY MEDICINE

## 2020-06-10 PROCEDURE — 25000128 H RX IP 250 OP 636: Performed by: INTERNAL MEDICINE

## 2020-06-10 PROCEDURE — 99221 1ST HOSP IP/OBS SF/LOW 40: CPT | Mod: 24 | Performed by: SURGERY

## 2020-06-10 PROCEDURE — 93005 ELECTROCARDIOGRAM TRACING: CPT

## 2020-06-10 PROCEDURE — 96375 TX/PRO/DX INJ NEW DRUG ADDON: CPT

## 2020-06-10 PROCEDURE — 25800030 ZZH RX IP 258 OP 636: Performed by: EMERGENCY MEDICINE

## 2020-06-10 PROCEDURE — 96374 THER/PROPH/DIAG INJ IV PUSH: CPT

## 2020-06-10 PROCEDURE — 80048 BASIC METABOLIC PNL TOTAL CA: CPT | Performed by: EMERGENCY MEDICINE

## 2020-06-10 PROCEDURE — 99220 ZZC INITIAL OBSERVATION CARE,LEVL III: CPT | Performed by: INTERNAL MEDICINE

## 2020-06-10 PROCEDURE — 74176 CT ABD & PELVIS W/O CONTRAST: CPT

## 2020-06-10 PROCEDURE — 25000128 H RX IP 250 OP 636: Performed by: EMERGENCY MEDICINE

## 2020-06-10 PROCEDURE — 99285 EMERGENCY DEPT VISIT HI MDM: CPT | Mod: 25

## 2020-06-10 PROCEDURE — 85025 COMPLETE CBC W/AUTO DIFF WBC: CPT | Performed by: EMERGENCY MEDICINE

## 2020-06-10 PROCEDURE — G0378 HOSPITAL OBSERVATION PER HR: HCPCS

## 2020-06-10 PROCEDURE — 83690 ASSAY OF LIPASE: CPT | Performed by: EMERGENCY MEDICINE

## 2020-06-10 PROCEDURE — 80076 HEPATIC FUNCTION PANEL: CPT | Performed by: EMERGENCY MEDICINE

## 2020-06-10 PROCEDURE — 25800030 ZZH RX IP 258 OP 636: Performed by: INTERNAL MEDICINE

## 2020-06-10 PROCEDURE — 96376 TX/PRO/DX INJ SAME DRUG ADON: CPT

## 2020-06-10 RX ORDER — OXYCODONE HYDROCHLORIDE 5 MG/1
5 TABLET ORAL
Status: DISCONTINUED | OUTPATIENT
Start: 2020-06-10 | End: 2020-06-10

## 2020-06-10 RX ORDER — ONDANSETRON 2 MG/ML
INJECTION INTRAMUSCULAR; INTRAVENOUS
Status: DISCONTINUED
Start: 2020-06-10 | End: 2020-06-10 | Stop reason: HOSPADM

## 2020-06-10 RX ORDER — PROCHLORPERAZINE 25 MG
25 SUPPOSITORY, RECTAL RECTAL EVERY 12 HOURS PRN
Status: DISCONTINUED | OUTPATIENT
Start: 2020-06-10 | End: 2020-06-10

## 2020-06-10 RX ORDER — ASPIRIN 81 MG/1
81 TABLET ORAL DAILY
Status: DISCONTINUED | OUTPATIENT
Start: 2020-06-10 | End: 2020-06-11 | Stop reason: HOSPADM

## 2020-06-10 RX ORDER — HYDROMORPHONE HYDROCHLORIDE 1 MG/ML
0.5 INJECTION, SOLUTION INTRAMUSCULAR; INTRAVENOUS; SUBCUTANEOUS
Status: DISCONTINUED | OUTPATIENT
Start: 2020-06-10 | End: 2020-06-10

## 2020-06-10 RX ORDER — DICLOFENAC SODIUM 1 MG/ML
2 SOLUTION/ DROPS OPHTHALMIC EVERY 6 HOURS PRN
Status: DISCONTINUED | OUTPATIENT
Start: 2020-06-10 | End: 2020-06-11 | Stop reason: HOSPADM

## 2020-06-10 RX ORDER — ONDANSETRON 4 MG/1
4 TABLET, ORALLY DISINTEGRATING ORAL EVERY 6 HOURS PRN
Status: DISCONTINUED | OUTPATIENT
Start: 2020-06-10 | End: 2020-06-11 | Stop reason: HOSPADM

## 2020-06-10 RX ORDER — ONDANSETRON 2 MG/ML
4 INJECTION INTRAMUSCULAR; INTRAVENOUS EVERY 30 MIN PRN
Status: DISCONTINUED | OUTPATIENT
Start: 2020-06-10 | End: 2020-06-10

## 2020-06-10 RX ORDER — PROCHLORPERAZINE MALEATE 10 MG
10 TABLET ORAL EVERY 6 HOURS PRN
Status: DISCONTINUED | OUTPATIENT
Start: 2020-06-10 | End: 2020-06-11 | Stop reason: HOSPADM

## 2020-06-10 RX ORDER — ONDANSETRON 4 MG/1
4 TABLET, ORALLY DISINTEGRATING ORAL EVERY 6 HOURS PRN
Status: DISCONTINUED | OUTPATIENT
Start: 2020-06-10 | End: 2020-06-10

## 2020-06-10 RX ORDER — LISINOPRIL 20 MG/1
20 TABLET ORAL 2 TIMES DAILY
Status: DISCONTINUED | OUTPATIENT
Start: 2020-06-10 | End: 2020-06-11 | Stop reason: HOSPADM

## 2020-06-10 RX ORDER — METOCLOPRAMIDE HYDROCHLORIDE 5 MG/ML
10 INJECTION INTRAMUSCULAR; INTRAVENOUS EVERY 6 HOURS PRN
Status: DISCONTINUED | OUTPATIENT
Start: 2020-06-10 | End: 2020-06-11 | Stop reason: HOSPADM

## 2020-06-10 RX ORDER — ROSUVASTATIN CALCIUM 20 MG/1
40 TABLET, COATED ORAL AT BEDTIME
Status: DISCONTINUED | OUTPATIENT
Start: 2020-06-10 | End: 2020-06-11 | Stop reason: HOSPADM

## 2020-06-10 RX ORDER — ERYTHROMYCIN 5 MG/G
OINTMENT OPHTHALMIC 4 TIMES DAILY
Status: CANCELLED | OUTPATIENT
Start: 2020-06-10

## 2020-06-10 RX ORDER — AMOXICILLIN 250 MG
1 CAPSULE ORAL 2 TIMES DAILY
Status: DISCONTINUED | OUTPATIENT
Start: 2020-06-10 | End: 2020-06-11 | Stop reason: HOSPADM

## 2020-06-10 RX ORDER — OXYCODONE HYDROCHLORIDE 5 MG/1
5-10 TABLET ORAL
Status: DISCONTINUED | OUTPATIENT
Start: 2020-06-10 | End: 2020-06-11 | Stop reason: HOSPADM

## 2020-06-10 RX ORDER — ACETAMINOPHEN 325 MG/1
975 TABLET ORAL EVERY 6 HOURS PRN
Status: DISCONTINUED | OUTPATIENT
Start: 2020-06-10 | End: 2020-06-11 | Stop reason: HOSPADM

## 2020-06-10 RX ORDER — SODIUM CHLORIDE 9 MG/ML
INJECTION, SOLUTION INTRAVENOUS CONTINUOUS
Status: DISCONTINUED | OUTPATIENT
Start: 2020-06-10 | End: 2020-06-11 | Stop reason: HOSPADM

## 2020-06-10 RX ORDER — NALOXONE HYDROCHLORIDE 0.4 MG/ML
.1-.4 INJECTION, SOLUTION INTRAMUSCULAR; INTRAVENOUS; SUBCUTANEOUS
Status: DISCONTINUED | OUTPATIENT
Start: 2020-06-10 | End: 2020-06-11 | Stop reason: HOSPADM

## 2020-06-10 RX ORDER — HYDRALAZINE HYDROCHLORIDE 20 MG/ML
10 INJECTION INTRAMUSCULAR; INTRAVENOUS EVERY 4 HOURS PRN
Status: DISCONTINUED | OUTPATIENT
Start: 2020-06-10 | End: 2020-06-11 | Stop reason: HOSPADM

## 2020-06-10 RX ORDER — ONDANSETRON 2 MG/ML
4 INJECTION INTRAMUSCULAR; INTRAVENOUS EVERY 6 HOURS PRN
Status: DISCONTINUED | OUTPATIENT
Start: 2020-06-10 | End: 2020-06-11 | Stop reason: HOSPADM

## 2020-06-10 RX ORDER — POLYETHYLENE GLYCOL 3350 17 G/17G
17 POWDER, FOR SOLUTION ORAL DAILY PRN
Status: DISCONTINUED | OUTPATIENT
Start: 2020-06-10 | End: 2020-06-11 | Stop reason: HOSPADM

## 2020-06-10 RX ORDER — ONDANSETRON 2 MG/ML
4 INJECTION INTRAMUSCULAR; INTRAVENOUS EVERY 6 HOURS PRN
Status: DISCONTINUED | OUTPATIENT
Start: 2020-06-10 | End: 2020-06-10

## 2020-06-10 RX ORDER — CARVEDILOL 6.25 MG/1
6.25 TABLET ORAL 2 TIMES DAILY WITH MEALS
Status: DISCONTINUED | OUTPATIENT
Start: 2020-06-10 | End: 2020-06-11 | Stop reason: HOSPADM

## 2020-06-10 RX ORDER — AMOXICILLIN 250 MG
2 CAPSULE ORAL 2 TIMES DAILY
Status: DISCONTINUED | OUTPATIENT
Start: 2020-06-10 | End: 2020-06-11 | Stop reason: HOSPADM

## 2020-06-10 RX ORDER — PROCHLORPERAZINE 25 MG
25 SUPPOSITORY, RECTAL RECTAL EVERY 12 HOURS PRN
Status: DISCONTINUED | OUTPATIENT
Start: 2020-06-10 | End: 2020-06-11 | Stop reason: HOSPADM

## 2020-06-10 RX ORDER — HYDROMORPHONE HYDROCHLORIDE 1 MG/ML
0.2 INJECTION, SOLUTION INTRAMUSCULAR; INTRAVENOUS; SUBCUTANEOUS EVERY 4 HOURS PRN
Status: DISCONTINUED | OUTPATIENT
Start: 2020-06-10 | End: 2020-06-11 | Stop reason: HOSPADM

## 2020-06-10 RX ORDER — PROCHLORPERAZINE MALEATE 10 MG
10 TABLET ORAL EVERY 6 HOURS PRN
Status: DISCONTINUED | OUTPATIENT
Start: 2020-06-10 | End: 2020-06-10

## 2020-06-10 RX ORDER — CLOPIDOGREL BISULFATE 75 MG/1
75 TABLET ORAL DAILY
Status: DISCONTINUED | OUTPATIENT
Start: 2020-06-10 | End: 2020-06-11 | Stop reason: HOSPADM

## 2020-06-10 RX ORDER — METOCLOPRAMIDE 10 MG/1
10 TABLET ORAL EVERY 6 HOURS PRN
Status: DISCONTINUED | OUTPATIENT
Start: 2020-06-10 | End: 2020-06-11 | Stop reason: HOSPADM

## 2020-06-10 RX ORDER — AMLODIPINE BESYLATE 5 MG/1
5 TABLET ORAL EVERY MORNING
Status: DISCONTINUED | OUTPATIENT
Start: 2020-06-10 | End: 2020-06-11 | Stop reason: HOSPADM

## 2020-06-10 RX ADMIN — HYDROMORPHONE HYDROCHLORIDE 0.5 MG: 1 INJECTION, SOLUTION INTRAMUSCULAR; INTRAVENOUS; SUBCUTANEOUS at 12:57

## 2020-06-10 RX ADMIN — SODIUM CHLORIDE 1000 ML: 9 INJECTION, SOLUTION INTRAVENOUS at 14:30

## 2020-06-10 RX ADMIN — HYDROMORPHONE HYDROCHLORIDE 0.5 MG: 1 INJECTION, SOLUTION INTRAMUSCULAR; INTRAVENOUS; SUBCUTANEOUS at 16:11

## 2020-06-10 RX ADMIN — HYDRALAZINE HYDROCHLORIDE 10 MG: 20 INJECTION INTRAMUSCULAR; INTRAVENOUS at 18:05

## 2020-06-10 RX ADMIN — ONDANSETRON 4 MG: 2 INJECTION INTRAMUSCULAR; INTRAVENOUS at 14:29

## 2020-06-10 RX ADMIN — ONDANSETRON 4 MG: 2 INJECTION INTRAMUSCULAR; INTRAVENOUS at 20:47

## 2020-06-10 RX ADMIN — ONDANSETRON 4 MG: 2 INJECTION INTRAMUSCULAR; INTRAVENOUS at 12:57

## 2020-06-10 RX ADMIN — SODIUM CHLORIDE: 9 INJECTION, SOLUTION INTRAVENOUS at 18:06

## 2020-06-10 ASSESSMENT — ENCOUNTER SYMPTOMS
VOMITING: 1
NUMBNESS: 0
WEAKNESS: 0
HEADACHES: 1
TROUBLE SWALLOWING: 0
FATIGUE: 1
FEVER: 0
DIZZINESS: 0
NAUSEA: 1

## 2020-06-10 ASSESSMENT — MIFFLIN-ST. JEOR: SCORE: 1062.57

## 2020-06-10 NOTE — PLAN OF CARE
DATE & TIME: 6/10/20 1730- 1930 ER Admit  Cognitive Concerns/ Orientation :A&O 4  BEHAVIOR & AGGRESSION TOOL COLOR: green  CIWA SCORE: NA   ABNL VS/O2: / 71, on RA  MOBILITY:SBA  PAIN MANAGMENT: had IV dilaudid  x 2 in ED for left side head , jaw pain  DIET: clears -ADAT  BOWEL/BLADDER: continent  ABNL LAB/BG:  DRAIN/DEVICES: left PIV infusing  TELEMETRY RHYTHM: NA  SKIN: bruises on arms , left neck surgical site     TESTS/PROCEDURES:CT abdomen/ Pelvis  D/C DAY/GOALS/PLACE: pending progress  OTHER IMPORTANT INFO: Obsc patient, in with left side neck, head and dental pain, nausea and vomiting, had zofran and dilaudid in ED, BP elevated 181/ 71, not able to take po meds due to nausea, given  PRN hydralazine .Nicotine  patch on right arm.

## 2020-06-10 NOTE — ED NOTES
"[facility]  ED Nurse Handoff Report    ED Chief complaint: Post-op Problem      ED Diagnosis:   Final diagnoses:   Intractable vomiting with nausea, unspecified vomiting type       Code Status: Full Code    Allergies:   Allergies   Allergen Reactions     Contrast Dye Anaphylaxis     RASH, FACIAL AND NECK SWELLING, SOB, WHEEZING     Pantoprazole      Protonix caused diffuse edema     Chantix [Varenicline]      Terrible dreams     Penicillins Itching       Patient Story: Pt presents for Nausea and vomiting post op. Pt had left carotid endart on 6/8. Went home without problem but developed nausea and vomiting that night. Since has been unable to keep fluids down.    Focused Assessment:  +pain at surgical site    Treatments and/or interventions provided: Dilaudid 0.5 mg 2x, Zofran 4mg-2x, 1L NS.   Patient's response to treatments and/or interventions: Pain improved, not actively vomiting.     To be done/followed up on inpatient unit:  Cont to monitor.     Does this patient have any cognitive concerns?: NONE    Activity level - Baseline/Home:  Stand with Assist  Activity Level - Current:   Stand with Assist    Patient's Preferred language: English   Needed?: No    Isolation: None  Infection: Not Applicable  Bariatric?: No    Vital Signs:   Vitals:    06/10/20 1216 06/10/20 1320   BP: (!) 161/133 (!) 180/73   Pulse:  62   Resp: 18 13   Temp: 98.3  F (36.8  C)    TempSrc: Oral    SpO2: 98% 100%   Weight: 54.4 kg (120 lb)    Height: 1.575 m (5' 2\")        Cardiac Rhythm:     Was the PSS-3 completed:   Yes  What interventions are required if any?               Family Comments: Available by phone-Malu [phone]  OBS brochure/video discussed/provided to patient/family: N/A              Name of person given brochure if not patient: NA              Relationship to patient: NA    For the majority of the shift this patient's behavior was Green.   Behavioral interventions performed were NA.    ED NURSE " PHONE NUMBER: [phone]

## 2020-06-10 NOTE — CONSULTS
VASCULAR SURGERY     Shirley Hendricks came to the emergency department this afternoon after developing severe nausea and vomiting last evening.  Unable to keep down any food or liquids and even her medication.    Patient has a history of pan systemic vascular problems.  She developed a symptomatic left carotid stenosis associated with right arm TIAs.  She underwent a left carotid enterectomy with facial vein patch angioplasty on 6/8/2020.  She did well following the surgery with no recurrent neurological symptoms.  No nausea or other issues and discharged to home on the first postoperative day (yesterday).    Home on her chronic aspirin and Plavix.  Had recommended changing her to Crestor but she has not taken this medication yet due to the onset of the nausea and vomiting last evening and thus this cannot be the cause of this problem.    Exam: Seen in the emergency department.  Still with nausea despite IV Zofran.  Blood pressure has been labile between 116 180 systolic but may be related to her dry emesis.  She is alert and appropriate.  Left neck incision looks fine.  No focal neurological deficits.      Patient reports that she has had nausea episodes in the past following her other surgeries.    Laboratory: K= 4.2  SCr= 0.52            Normal troponin.  Lipase= 63            WBC= 8.3        Preoperative COVID 19 testing negative    Impression: Postoperative nausea and vomiting.  No specific etiology.  Receiving IV fluids and Zofran.  Left and decided admission depending on her response to this treatment.                Her symptoms are not directly related to the carotid surgery except for the fact that she underwent anesthesia.  I do not feel that evaluation of the recent carotid surgery is indicated.       Chadwick Lozano MD

## 2020-06-10 NOTE — PROGRESS NOTES
Admission    Patient arrives to room 613-2 via cart from ED  Care plan note: done  Inpatient nursing criteria listed below were met:    PCD's Documented: NO  Skin issues/needs documented :NO  Isolation education started/completed NA  Patient allergies verified with patient: YES  Verified completion of [place] Risk Assessment Tool:yes    Verified completion of Guardianship screening tool: NO  Fall Prevention: Care plan updated, Education given and documented YES  Care Plan initiated:YES  Home medications documented in belongings flowsheet: NA   Patient belongings documented in belongings flowsheet:YES  Reminder note (belongings/ medications) placed in discharge instructions:no  Admission profile/ required documentation complete: YES  Bedside Report Letter given and explained to patientNO

## 2020-06-10 NOTE — TELEPHONE ENCOUNTER
[facility]    Who is the name of the provider?:  Blake      What is the location you see this provider at?: [place]    Reason for call:  Surg 6/8.  Nausea & vomiting, cannot keep anything, including meds, down.    Can we leave a detailed message on this number?  YES

## 2020-06-10 NOTE — PROGRESS NOTES
RECEIVING UNIT ED HANDOFF REVIEW    ED Nurse Handoff Report was reviewed by: Serafin Bass RN on Diane 10, 2020 at 5:14 PM

## 2020-06-10 NOTE — ED PROVIDER NOTES
History     Chief Complaint:  Post-op Problem    The history is provided by the patient.      Shirley Hendricks is a 59 year old female who presents with a post-op problem. The patient had a left carotid endarterectomy 2 days ago by Dr. Lozano. She notes a left-sided head, jaw, and dental pain, as well as nausea and vomiting since arrival home, unable to keep down any pain medication. She also suggests increased fatigue since surgery. The patient denies dizziness, unsteadiness, neck swelling, fever, trouble swallowing, vision changes, or numbness or weakness in her arms or legs.    Allergies:  Contrast dye  Pantoprazole  Chantix  Penacillins    Medications:    Norvasc  ASA  Breo ellipta inhaler  Carvedilol  Plavix  Zestril  Nitrostat  Prilosec  Roxicodone  Crestor    Past Medical History:    Anxiety  COPD  Embolism and thrombosis of unspecified artery  GERD  Hyperlipidemia  Hypertension  Lupus  Depression  Myocardial infarction  Osteoarthritis  PAD  Asthma  Vitamin C deficiency  Neuropathy  Stroke  Bilateral carotid artery stenosis  Degenerative disc disease  Chronic allergic rhinitis   SVT  Colonic polyps    Past Surgical History:    Angiogram  Abdominal aneurysm fabric wrapping  Sinus surgery  Endarterectomy of right femoral artery  Cardiac catheterization  Endarterectomy, carotid  Salpingectomy, left  Cholecystectomy  Left knee surgery  Vascular surgery    Family History:    Mother - bladder cancer  Father - multiple heart attacks   Brother - clotting disorder    Social History:  Presents alone  Tobacco use: current every day smoker, 0.25 packs daily for 40 years  Alcohol use: positive   Marital Status:   [2]     Review of Systems   Constitutional: Positive for fatigue. Negative for fever.   HENT: Negative for trouble swallowing.    Eyes: Negative for visual disturbance.   Gastrointestinal: Positive for nausea and vomiting.   Neurological: Positive for headaches. Negative for dizziness, weakness and  "numbness.   All other systems reviewed and are negative.      Physical Exam     Patient Vitals for the past 24 hrs:   BP Temp Temp src Pulse Heart Rate Resp SpO2 Height Weight   06/10/20 1320 (!) 180/73 -- -- 62 64 13 100 % -- --   06/10/20 1216 (!) 161/133 98.3  F (36.8  C) Oral -- 71 18 98 % 1.575 m (5' 2\") 54.4 kg (120 lb)        Physical Exam  VS: Reviewed per above  HENT: Mucous membranes moist, no nuchal rigidity, left side neck with soft tissue swelling and surgical wound that is c/d/i. No trismus or change in phonation. Tolerating secretions.   EYES: sclera anicteric  CV: Rate as noted, regular rhythm.   RESP: Effort normal. Breath sounds are normal bilaterally.  GI: no tenderness/rebound/guarding, not distended.  NEURO: GCS 15, cranial nerves II through XII are intact, 5 out of 5 strength in all 4 extremities, sensation is intact light touch in all 4 extremities  MSK: No deformity of the extremities  SKIN: Warm and dry      Emergency Department Course     ECG:  Indication: post-op problem  Time: 1333   Vent. Rate 65 bpm. MS interval 168. QRS duration 84. QT/QTc 422/436. P-R-T axis 62 13 52.  Normal sinus rhythm. Possible anterior infarct, age undetermined. Abnormal ECG. No significant change compared to EKG dated 6/8/2020. Read time: 1335     Laboratory:  Laboratory findings were communicated with the patient who voiced understanding of the findings.    CBC: WBC 8.3, HGB 14.9,    BMP: AWNL (Creatinine 0.52)    1243 Troponin: <0.015   Hepatic panel: AWNL  Lipase: 63 (L)    Interventions:  1257 Zofran 4 mg IV   Dilaudid 0.5 mg IV  1429 Zofran 4 mg IV  1430 NS 1 L IV     Emergency Department Course:  Past medical records, nursing notes, and vitals reviewed.    ED Course as of Mike 10 1519   Wed Mike 10, 2020   1216 I performed an exam of the patient as documented above.      1257 IV was inserted and blood was drawn for laboratory testing, results above.      1335 EKG obtained in the ED, see results above. "       1420 Patient rechecked and updated.      1517 Patient rechecked and updated.         I consulted with Dr. Kelly of the hospitalist services. They were in agreement to accept the patient for admission.    Findings and plan explained to the Patient who consents to admission.    Impression & Plan     Medical Decision Making:  Shirley Hendricks is a 59 year old female who presents to the emergency department today with intractable nausea and vomiting since left-sided carotid endarterectomy 2 days ago.  On arrival she has some mild hypertension.  There are no new neurological deficits by history or exam.  Left sided carotid endarterectomy surgical site is clean dry and intact with mild soft tissue swelling.  No signs of suggest neck abscess or expanding hematoma.  No abdominal tenderness to suggest intra-abdominal process.  No lab evidence of pancreatitis or hepatitis.  No signs of ACS.  Despite IV fluids and IV antiemetics, patient was unable to tolerate p.o.  Ultimately she was admitted for further symptom control.  Dr. Lozano of vascular surgery actually came to bedside to see patient and per his note, did not feel that nausea was related to recent surgery. Upon signout to my colleague, patient was awaiting inpatient bed.    Discharge Diagnosis:    ICD-10-CM    1. Intractable vomiting with nausea, unspecified vomiting type  R11.2        Disposition:  Admitted    Scribe Disclosure:  I, Naz Jones, am serving as a scribe at 12:26 PM on 6/10/2020 to document services personally performed by Alcides Cartagena MD based on my observations and the provider's statements to me.       Alcides Cartagena MD  06/10/20 [id number]

## 2020-06-10 NOTE — H&P
[facility]    History and Physical - Hospitalist Service       Date of Admission:  6/10/2020    Assessment & Plan     Shirley Hendricks is a 59 year old female with a history of symptomatic high-grade left carotid stenosis who is status post left carotid endarterectomy with distal facial vein patch angioplasty on 6/8/2020, hyperlipidemia, peripheral arterial disease with patent aortobifemoral bypass graft and left femoropopliteal bypass graft, hypertension, history of tobacco use disorder who presents with intractable nausea and vomiting, weakness and headache since her discharge after postoperative day 1.    Persistent nausea vomiting  -Could be side effect of anesthesia versus gastroenteritis vs secondary to her constipation.  Patient has not had bowel movement for the last 1 week.  -Check abdomen pelvis CT to rule out any intra-abdominal pathology.  Labs including LFTs and lipase done in the ED negative, no leukocytosis  -Symptomatic treatment of nausea vomiting with Zofran/Compazine, hydrate until patient tolerates oral intake  -Initiate bowel regimen  -Reports generalized weakness due to persistent nausea vomiting and inability to take any medication, will consult PT    Symptomatic high grade left carotid artery stenosis undergoing a left carotid endarterectomy 6/8/20  -Was evaluated by Dr. Lozano in the ED who does not feel her current symptoms are related to postoperative problems.   She had not been able to take aspirin, statin and Plavix due to her persistent nausea vomiting resume once verified by pharmacy   -She also has moderate restenosis to 60% on the right post right carotid CEA in the past. This will be followed as an outpatient by Dr. Lozano.       Hyperlipidemia   Her lipid panel on most recent admission revealed an LDL of 74, HDL 45, triglycerides 85, and total cholesterol 136 while on maximum dose Atorvastatin 80 mg daily. Given her carotid disease, she was switched her over to  Crestor 40 mg daily  -Resume once verified by pharmacy     Peripheral arterial disease with history of aortobifemoral bypass graft and left femoral-popliteal bypass graft   She does have chronic but not disabling claudication symptoms that may be somewhat worse in her right leg due to her known SFA occlusion.   Outpatient follow-up     Essential Hypertension   -PTA on Coreg, lisinopril and amlodipine  -Blood pressure in the ER high likely from acute stress and also patient not being able to take her PTA medication  -Resume PTA medications once verified by pharmacy with holding parameters  -PRN IV hydralazine  -Monitor and adjust medication as needed     Tobacco use  COPD, not on home oxygen   -Patient used to smoke 0.25 packs per day prior to recent surgery and agreeable to quit smoking.   -Nicotine patches as needed  -Resume PTA Breo inhaler      Diet: Advance diet as tolerated to regular  DVT Prophylaxis: Pneumatic Compression Devices  Alvarez Catheter: not present  Code Status: Full code, discussed with patient         Disposition Plan   Expected discharge: 1 to 2 days, recommended to prior living arrangement once Nausea vomiting improved and tolerating oral intake and patient feels better and back to her baseline.  Entered: Gabrielle Kelly MD 06/10/2020, 4:03 PM     The patient's care was discussed with the Patient.    Gabrielle Kelly MD  [facility]    ______________________________________________________________________    Chief Complaint   Nausea vomiting and generalized weakness    History is obtained from the patient    History of Present Illness   Shirley Hendricks is a 59 year old female with a history of symptomatic high-grade left carotid stenosis who is status post left carotid endarterectomy with distal facial vein patch angioplasty on 6/8/2020, hyperlipidemia, peripheral arterial disease with patent aortobifemoral bypass graft and left femoropopliteal bypass graft, hypertension, history  of tobacco use disorder who presents with intractable nausea and vomiting, weakness and headache since her discharge after postoperative day 1.    The patient reports that she has been having persistent nausea vomiting/retching since she got discharged.  Due to vomiting multiple times her abdomen has started hurting.  Her last bowel movement was about a week ago.  She denies any fever or sick contacts.  She does complain of headache and also noticed that her blood pressure has been uncontrolled since she got discharged.  She has not been tolerating any of her medication including her blood pressure medication, aspirin and Plavix since she got discharged.  She denies any chest pain, fever, palpitation, presyncope or syncope, new numbness tingling weakness.  She does have chronic tingling in her right feet but nothing new since surgery.    She presented to the ED her vitals were, temperature 98.3 pulse rate 71 blood pressure 161/133 respiratory rate 18 O2 sat 90% on room air.  Labs showed unremarkable CMP, lipase 63, unremarkable CBC, troponin first set negative.    Review of Systems    The 10 point Review of Systems is negative other than noted in the HPI or here.   She does have chronic cough due to her history of tobacco use.    Past Medical History    I have reviewed this patient's medical history and updated it with pertinent information if needed.   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 clot     Post-menopausal      Reflux esophagitis 2/04    EGD: esophagitis and medium HH     Uncomplicated asthma      Vitamin C deficiency 8/12/2018       Past Surgical History   I have reviewed this patient's surgical history and updated it with pertinent information if needed.  Past Surgical History:   Procedure Laterality Date     ANGIOGRAM       C FABRIC WRAPPING OF ABDOMINAL ANEURYSM       C NONSPECIFIC PROCEDURE  12/00    angioplasty with stent right fem. a.     C NONSPECIFIC PROCEDURE  1987    sinus surgery     C NONSPECIFIC PROCEDURE  9/2/2009    Emergent left groin exploration with oversewing of bleeding angiographic site. 2. Endarterectomy of common femoral-proximal superficial femoral artery with greater saphenous vein patch angioplasty.   a. [place] of accessory right greater saphenous vein.      C NONSPECIFIC PROCEDURE  8/27/2009    occluded left common iliac and external iliac arteries were successfully revascularized with stenting to 8 and 7 mm      CARDIAC CATHERIZATION  9/3/2009    multivessel CAD without target lesions, med mgmt indicated, preserved ef     ENDARTERECTOMY CAROTID Right 5/11/2016    Procedure: ENDARTERECTOMY CAROTID;  Surgeon: Chadwick Lozano MD;  Location:  OR     GYN SURGERY  left tube    left salpingectomy     LAPAROSCOPIC CHOLECYSTECTOMY N/A 9/27/2017    Procedure: LAPAROSCOPIC CHOLECYSTECTOMY;  LAPAROSCOPIC CHOLECYSTECTOMY;  Surgeon: Jacoby Tapia MD;  Location:  [facility]     ORTHOPEDIC SURGERY      left knee surgery     VASCULAR SURGERY  aoto bi fem  left fem-AK pop       Social History   I have reviewed this patient's social history and updated it with pertinent information if needed.  Social History     Tobacco Use     Smoking status: Current Every Day Smoker     Packs/day:  0.25     Years: 40.00     Pack years: 10.00     Types: Cigarettes     Start date: 1958     Smokeless tobacco: Never Used     Tobacco comment: 1/2 PPD   Substance Use Topics     Alcohol use: Yes     Alcohol/week: 0.0 standard drinks     Comment: x1-2 yr     Drug use: No       Family History   I have reviewed this patient's family history and updated it with pertinent information if needed.   Family History   Problem Relation Age of Onset     Cancer Mother         bladder     Cardiovascular Father         alive,multiple heart attacks     Diabetes Maternal Grandmother      Lung Cancer Maternal Grandmother      Blood Disease Brother         clotting disorder     Prior to Admission Medications   Prior to Admission Medications   Prescriptions Last Dose Informant Patient Reported? Taking?   ASPIRIN EC PO  Self Yes No   Sig: Take 81 mg by mouth daily   Acetaminophen (TYLENOL PO)  Self Yes No   Sig: Take 1,000-1,500 mg by mouth every 6 hours as needed for mild pain or fever    BREO ELLIPTA 200-25 MCG/INH Inhaler   No No   Sig: Inhale 1 puff into the lungs daily INDICATION: COPD CONTROLLER   CALCIUM 600-D 600-400 MG-UNIT TABS   No No   Sig: Take 1 tablet by mouth 2 times daily   amLODIPine (NORVASC) 5 MG tablet   Yes No   Sig: Take 5 mg by mouth every morning   artificial tears OINT ophthalmic ointment   No No   Sig: Apply to eyes every 4 hours as needed for comfort.   carvedilol 6.25 MG PO tablet   No No   Sig: Take 1 tablet (6.25 mg) by mouth 2 times daily (with meals)   clopidogrel (PLAVIX) 75 MG tablet   No No   Sig: Take 1 tablet (75 mg) by mouth daily   denosumab (PROLIA) 60 MG/ML SOLN injection   No No   Sig: Inject 1 mL (60 mg) Subcutaneous every 6 months INDICATION: TO TREAT OSTEOPOROSIS   diclofenac (VOLTAREN) 0.1 % ophthalmic solution   No No   Sig: Place 2 drops into the right eye every 6 hours as needed for moderate pain   erythromycin (ROMYCIN) 5 MG/GM ophthalmic ointment   No No   Sig: Place into the right  eye 4 times daily   lisinopril (ZESTRIL) 20 MG tablet   No No   Sig: Take 1 tablet (20 mg) by mouth 2 times daily   nitroGLYcerin (NITROSTAT) 0.4 MG sublingual tablet   No No   Sig: Place 1 tablet (0.4 mg) under the tongue See Admin Instructions for chest pain   omeprazole (PRILOSEC) 20 MG DR capsule   No No   Sig: TAKE ONE CAPSULE BY MOUTH DAILY AS NEEDED   Patient taking differently: Take 20 mg by mouth daily TAKE ONE CAPSULE BY MOUTH DAILY AS NEEDED    oxyCODONE (ROXICODONE) 5 MG tablet   No No   Sig: Take 1 tablet (5 mg) by mouth every 3 hours as needed for moderate to severe pain   rosuvastatin (CRESTOR) 40 MG tablet   No No   Sig: Take 1 tablet (40 mg) by mouth At Bedtime      Facility-Administered Medications: None     Allergies   Allergies   Allergen Reactions     Contrast Dye Anaphylaxis     RASH, FACIAL AND NECK SWELLING, SOB, WHEEZING     Pantoprazole      Protonix caused diffuse edema     Chantix [Varenicline]      Terrible dreams     Penicillins Itching       Physical Exam   Vital Signs: Temp: 98.3  F (36.8  C) Temp src: Oral BP: (!) 180/73 Pulse: 62 Heart Rate: 64 Resp: 13 SpO2: 100 % O2 Device: None (Room air)    Weight: 120 lbs 0 oz    Exam:  Constitutional: Awake, alert and no distress. Appears comfortable  Head: Normocephalic. No masses, lesions, tenderness or abnormalities  ENT: ENT exam normal, incision from recent surgery on left neck looks well approximated and clean, no neck nodes or sinus tenderness  Cardiovascular: RRR.  No murmurs, no rubs or JVD  Respiratory: Normal WOB,b/l equal air entry, no wheezes or crackles   Gastrointestinal: Abdomen soft, non-tender. BS normal. No masses, organomegaly  : Deferred  Extremities : No edema , no clubbing or cyanosis    Neurologic: Cranial nerves II-XII grossly intact , power symmetrical bilaterally, Reflexes normal and symmetric. Sensation grossly WNL.      Data   Data reviewed today: I reviewed all medications, new labs and imaging results over the  last 24 hours. I personally reviewed the EKG tracing showing Sinus rhythm with anterior T wave inversion.    Recent Labs   Lab 06/10/20  1243 06/09/20  0654 06/08/20  1302   WBC 8.3 6.8 5.1   HGB 14.9 11.9 14.1   MCV 93 94 94    133* 149*    131* 137   POTASSIUM 4.2 4.2 4.1   CHLORIDE 98 101 105   CO2 24 25 25   BUN 12 11 9   CR 0.52 0.54 0.56   ANIONGAP 11 5 7   SONIA 9.3 8.3* 9.0   GLC 72 89 85   ALBUMIN 3.6  --   --    PROTTOTAL 7.9  --   --    BILITOTAL 0.5  --   --    ALKPHOS 82  --   --    ALT 30  --   --    AST 21  --   --    LIPASE 63*  --   --    TROPI <0.015  --   --      Most Recent 3 CBC's:  Recent Labs   Lab Test 06/10/20  1243 06/09/20  0654 06/08/20  1302   WBC 8.3 6.8 5.1   HGB 14.9 11.9 14.1   MCV 93 94 94    133* 149*     Most Recent 3 BMP's:  Recent Labs   Lab Test 06/10/20  1243 06/09/20  0654 06/08/20  1302    131* 137   POTASSIUM 4.2 4.2 4.1   CHLORIDE 98 101 105   CO2 24 25 25   BUN 12 11 9   CR 0.52 0.54 0.56   ANIONGAP 11 5 7   SONIA 9.3 8.3* 9.0   GLC 72 89 85     Most Recent 2 LFT's:  Recent Labs   Lab Test 06/10/20  1243 09/25/19  0909   AST 21 16   ALT 30 35   ALKPHOS 82 66   BILITOTAL 0.5 0.4     No results found for this or any previous visit (from the past 24 hour(s)).

## 2020-06-10 NOTE — TELEPHONE ENCOUNTER
Patient had Left carotid endarterectomy with distal facial vein patch angioplasty on 6/8/20 with Dr. Lozano.    I called Shirley and she discussed she cannot hold down any fluids, medications, or food since her discharge home from the hospital. She states she feels weak. She reports pain in her left jaw, neck incision is intact with no drainage or fevers. I advised Shirley present to the ER for IV fluids and nausea medication since it has been 24 hours without being able to hold anything down. She is in agreement.     Christina SANTANAN, RN    [facility]  [facility]  Office: [phone]  Fax: [phone]

## 2020-06-10 NOTE — PHARMACY-ADMISSION MEDICATION HISTORY
Pharmacy Medication History  Admission medication history interview status for the 6/10/2020  admission is complete. See EPIC admission navigator for prior to admission medications     Medication history sources: Patient, Surescripts and Epic records  Medication history source reliability: Good  Adherence assessment: Moderate    Significant changes made to the medication list:  none    Additional medication history information:   Patient has not been able to take any of her medications since she was discharged.    Medication reconciliation completed by provider prior to medication history? Yes, no changes made    Time spent in this activity: 30 minutes      Prior to Admission medications    Medication Sig Last Dose Taking? Auth Provider   Acetaminophen (TYLENOL PO) Take 1,000-1,500 mg by mouth every 6 hours as needed for mild pain or fever  6/9/2020 at Unknown time Yes Reported, Patient   amLODIPine (NORVASC) 5 MG tablet Take 5 mg by mouth every morning 6/9/2020 at Unknown time Yes Unknown, Entered By History   artificial tears OINT ophthalmic ointment Apply to eyes every 4 hours as needed for comfort. as needed Yes Kaitlin Oates MD   ASPIRIN EC PO Take 81 mg by mouth daily 6/9/2020 at Unknown time Yes Reported, Patient   BREO ELLIPTA 200-25 MCG/INH Inhaler Inhale 1 puff into the lungs daily INDICATION: COPD CONTROLLER 6/9/2020 at Unknown time Yes Vasquez Benoit MD   CALCIUM 600-D 600-400 MG-UNIT TABS Take 1 tablet by mouth 2 times daily Past Week at Unknown time Yes Vasquez Benoit MD   carvedilol 6.25 MG PO tablet Take 1 tablet (6.25 mg) by mouth 2 times daily (with meals) 6/9/2020 at Unknown time Yes Hayden Mason MD   clopidogrel (PLAVIX) 75 MG tablet Take 1 tablet (75 mg) by mouth daily 6/9/2020 at Unknown time Yes Vasquez Benoit MD   denosumab (PROLIA) 60 MG/ML SOLN injection Inject 1 mL (60 mg) Subcutaneous every 6 months INDICATION: TO TREAT OSTEOPOROSIS  Yes Vasquez Benoit MD   diclofenac (VOLTAREN) 0.1 %  ophthalmic solution Place 2 drops into the right eye every 6 hours as needed for moderate pain as needed Yes Kaitlin Oates MD   erythromycin (ROMYCIN) 5 MG/GM ophthalmic ointment Place into the right eye 4 times daily 6/8/2020 Yes Kaitlin Oates MD   lisinopril (ZESTRIL) 20 MG tablet Take 1 tablet (20 mg) by mouth 2 times daily 6/8/2020 at pm Yes Vasquez Benoit MD   nitroGLYcerin (NITROSTAT) 0.4 MG sublingual tablet Place 1 tablet (0.4 mg) under the tongue See Admin Instructions for chest pain as needed Yes Vasquez Benoti MD   omeprazole (PRILOSEC) 20 MG DR capsule TAKE ONE CAPSULE BY MOUTH DAILY AS NEEDED  Patient taking differently: Take 20 mg by mouth daily TAKE ONE CAPSULE BY MOUTH DAILY AS NEEDED  6/9/2020 at Unknown time Yes Héctor Heard MD   oxyCODONE (ROXICODONE) 5 MG tablet Take 1 tablet (5 mg) by mouth every 3 hours as needed for moderate to severe pain 6/9/2020 at Unknown time Yes Kaitlin Oates MD   rosuvastatin (CRESTOR) 40 MG tablet Take 1 tablet (40 mg) by mouth At Bedtime 6/8/2020 at pm Yes Kaitlin Oates MD

## 2020-06-11 VITALS
BODY MASS INDEX: 22.08 KG/M2 | HEIGHT: 62 IN | DIASTOLIC BLOOD PRESSURE: 71 MMHG | TEMPERATURE: 98 F | OXYGEN SATURATION: 95 % | HEART RATE: 64 BPM | RESPIRATION RATE: 16 BRPM | WEIGHT: 120 LBS | SYSTOLIC BLOOD PRESSURE: 123 MMHG

## 2020-06-11 LAB
ALBUMIN SERPL-MCNC: 2.9 G/DL (ref 3.4–5)
ALP SERPL-CCNC: 63 U/L (ref 40–150)
ALT SERPL W P-5'-P-CCNC: 22 U/L (ref 0–50)
ANION GAP SERPL CALCULATED.3IONS-SCNC: 11 MMOL/L (ref 3–14)
AST SERPL W P-5'-P-CCNC: 13 U/L (ref 0–45)
BILIRUB SERPL-MCNC: 0.6 MG/DL (ref 0.2–1.3)
BUN SERPL-MCNC: 10 MG/DL (ref 7–30)
CALCIUM SERPL-MCNC: 8.3 MG/DL (ref 8.5–10.1)
CHLORIDE SERPL-SCNC: 105 MMOL/L (ref 94–109)
CO2 SERPL-SCNC: 20 MMOL/L (ref 20–32)
CREAT SERPL-MCNC: 0.45 MG/DL (ref 0.52–1.04)
ERYTHROCYTE [DISTWIDTH] IN BLOOD BY AUTOMATED COUNT: 13.3 % (ref 10–15)
GFR SERPL CREATININE-BSD FRML MDRD: >90 ML/MIN/{1.73_M2}
GLUCOSE SERPL-MCNC: 79 MG/DL (ref 70–99)
HCT VFR BLD AUTO: 38.3 % (ref 35–47)
HGB BLD-MCNC: 12.4 G/DL (ref 11.7–15.7)
MCH RBC QN AUTO: 30.3 PG (ref 26.5–33)
MCHC RBC AUTO-ENTMCNC: 32.4 G/DL (ref 31.5–36.5)
MCV RBC AUTO: 94 FL (ref 78–100)
PLATELET # BLD AUTO: 171 10E9/L (ref 150–450)
POTASSIUM SERPL-SCNC: 3.8 MMOL/L (ref 3.4–5.3)
PROT SERPL-MCNC: 6.3 G/DL (ref 6.8–8.8)
RBC # BLD AUTO: 4.09 10E12/L (ref 3.8–5.2)
SARS-COV-2 PCR COMMENT: NORMAL
SARS-COV-2 RNA SPEC QL NAA+PROBE: NEGATIVE
SODIUM SERPL-SCNC: 136 MMOL/L (ref 133–144)
SPECIMEN SOURCE: NORMAL
WBC # BLD AUTO: 5.6 10E9/L (ref 4–11)

## 2020-06-11 PROCEDURE — 80053 COMPREHEN METABOLIC PANEL: CPT | Performed by: INTERNAL MEDICINE

## 2020-06-11 PROCEDURE — 40000893 ZZH STATISTIC PT IP EVAL DEFER

## 2020-06-11 PROCEDURE — 25800030 ZZH RX IP 258 OP 636: Performed by: INTERNAL MEDICINE

## 2020-06-11 PROCEDURE — 36415 COLL VENOUS BLD VENIPUNCTURE: CPT | Performed by: INTERNAL MEDICINE

## 2020-06-11 PROCEDURE — 25000132 ZZH RX MED GY IP 250 OP 250 PS 637: Performed by: INTERNAL MEDICINE

## 2020-06-11 PROCEDURE — 99217 ZZC OBSERVATION CARE DISCHARGE: CPT | Performed by: PHYSICIAN ASSISTANT

## 2020-06-11 PROCEDURE — 85027 COMPLETE CBC AUTOMATED: CPT | Performed by: INTERNAL MEDICINE

## 2020-06-11 PROCEDURE — G0378 HOSPITAL OBSERVATION PER HR: HCPCS

## 2020-06-11 PROCEDURE — 25000128 H RX IP 250 OP 636: Performed by: INTERNAL MEDICINE

## 2020-06-11 RX ORDER — ONDANSETRON 4 MG/1
4 TABLET, ORALLY DISINTEGRATING ORAL EVERY 6 HOURS PRN
Qty: 20 TABLET | Refills: 1 | Status: SHIPPED | OUTPATIENT
Start: 2020-06-11 | End: 2020-06-19

## 2020-06-11 RX ORDER — AMOXICILLIN 250 MG
1-2 CAPSULE ORAL 2 TIMES DAILY PRN
COMMUNITY
Start: 2020-06-11 | End: 2020-06-19

## 2020-06-11 RX ORDER — POLYETHYLENE GLYCOL 3350 17 G/17G
17 POWDER, FOR SOLUTION ORAL DAILY PRN
COMMUNITY
Start: 2020-06-11 | End: 2020-09-22

## 2020-06-11 RX ADMIN — AMLODIPINE BESYLATE 5 MG: 5 TABLET ORAL at 08:51

## 2020-06-11 RX ADMIN — SODIUM CHLORIDE: 9 INJECTION, SOLUTION INTRAVENOUS at 04:46

## 2020-06-11 RX ADMIN — NICOTINE 1 PATCH: 7 PATCH, EXTENDED RELEASE TRANSDERMAL at 09:42

## 2020-06-11 RX ADMIN — DOCUSATE SODIUM 50 MG AND SENNOSIDES 8.6 MG 1 TABLET: 8.6; 5 TABLET, FILM COATED ORAL at 09:42

## 2020-06-11 RX ADMIN — CLOPIDOGREL BISULFATE 75 MG: 75 TABLET, FILM COATED ORAL at 09:41

## 2020-06-11 RX ADMIN — ASPIRIN 81 MG: 81 TABLET, DELAYED RELEASE ORAL at 11:27

## 2020-06-11 RX ADMIN — LISINOPRIL 20 MG: 20 TABLET ORAL at 00:35

## 2020-06-11 RX ADMIN — HYDRALAZINE HYDROCHLORIDE 10 MG: 20 INJECTION INTRAMUSCULAR; INTRAVENOUS at 04:46

## 2020-06-11 RX ADMIN — OMEPRAZOLE 20 MG: 20 CAPSULE, DELAYED RELEASE ORAL at 08:50

## 2020-06-11 RX ADMIN — ACETAMINOPHEN 975 MG: 325 TABLET, FILM COATED ORAL at 09:41

## 2020-06-11 RX ADMIN — LISINOPRIL 20 MG: 20 TABLET ORAL at 11:27

## 2020-06-11 RX ADMIN — FLUTICASONE FUROATE AND VILANTEROL TRIFENATATE 1 PUFF: 200; 25 POWDER RESPIRATORY (INHALATION) at 11:31

## 2020-06-11 RX ADMIN — CARVEDILOL 6.25 MG: 6.25 TABLET, FILM COATED ORAL at 11:27

## 2020-06-11 NOTE — PROGRESS NOTES
Discharge    Patient discharged to home via  Private transportaion with  family  Care plan note done     Listed belongings gathered and returned to patient. Yes  Care Plan and Patient education resolved: Yes  Prescriptions if needed, hard copies sent with patient  NA  Home and hospital acquired medications returned to patient:yes Medication Bin checked and emptied on discharge Yes  Follow up appointment made for patient: No

## 2020-06-11 NOTE — PLAN OF CARE
Cognitive Concerns/ Orientation :A&O4  BEHAVIOR & AGGRESSION TOOL COLOR: Green  CIWA SCORE: NA   ABNL VS/O2: VSS with elevated BP. Last BP in the 170's. Pt has been unable to tolerate oral BP meds d/t nausea and emesis x1. On RA. LS diminished. Imfreq cough.   MOBILITY:SBA with GB d/t weakness and ill feelings   PAIN MANAGMENT: Generalized discomforts, nausea continuous   DIET: Tolerating only ice chips and small sips. Clear liquid diet, advance as tolerated.   BOWEL/BLADDER: Continent, no BM this evening. Pt reports no BM for 1 week  ABNL LAB/BG: Covid reswab completed and processing for asymptomatic. Lipase 63  DRAIN/DEVICES: lLUE PIV infusing NS at 100mL/hr   TELEMETRY RHYTHM: NA  SKIN: Bruises on arms and L neck. Left neck surgical incision site RIN, edges approximated, area pink, purple in color, some edema and pain.     TESTS/PROCEDURES: CT abdomen/ Pelvis completed this evening   D/C DAY/GOALS/PLACE: Pending progress, able to tolerate diet   OTHER IMPORTANT INFO: Obs patient, in with left side neck, head and dental pain, nausea and vomiting, had zofran and dilaudid in ED, BP elevated 181/ 71, not able to take po meds due to nausea, given  PRN hydralazine. BP in the 170's and WCTM. Nausea improved slightly this evening and will try to tolerate some scheduled oral medications. Nicotine  patch on right arm. Pt calls as needed.

## 2020-06-11 NOTE — PLAN OF CARE
Pt given discharge instructions, pt states feeling better, she ate fair and tolerated diet. Pt's BP improved. Pt given discharge instructions. Discharged to home.

## 2020-06-11 NOTE — PROGRESS NOTES
"VASCULAR SURGERY PROGRESS NOTE    Subjective:  Patient feeling slightly better, nausea improved but still having some. Tolerating small amounts of clears, popsicle. Last BM last week. No emesis overnight. No abdominal pain. No new neurologic symptoms, no weakness, numbness, tingling in her extremities. No throat swelling or difficulty swallowing, facial muscles at baseline. Some left jaw pain post-op. Ambulating to the bathroom, no dizziness, stable on feet. Would like to go home to help     Objective:    Intake/Output Summary (Last 24 hours) at 6/11/2020 0748  Last data filed at 6/11/2020 0446  Gross per 24 hour   Intake 950 ml   Output --   Net 950 ml         Labs:  ROUTINE IP LABS (Last four results)  BMP  Recent Labs   Lab 06/10/20  1243 06/09/20  0654 06/08/20  1302    131* 137   POTASSIUM 4.2 4.2 4.1   CHLORIDE 98 101 105   SONIA 9.3 8.3* 9.0   CO2 24 25 25   BUN 12 11 9   CR 0.52 0.54 0.56   GLC 72 89 85     CBC  Recent Labs   Lab 06/10/20  1243 06/09/20  0654 06/08/20  1302   WBC 8.3 6.8 5.1   RBC 4.66 3.83 4.49   HGB 14.9 11.9 14.1   HCT 43.5 36.1 42.0   MCV 93 94 94   MCH 32.0 31.1 31.4   MCHC 34.3 33.0 33.6   RDW 13.2 13.7 13.5    133* 149*     INRNo lab results found in last 7 days.    PHYSICAL EXAM:  BP (!) 162/62 (BP Location: Right arm)   Pulse 62   Temp 98.4  F (36.9  C) (Oral)   Resp 18   Ht 1.575 m (5' 2\")   Wt 54.4 kg (120 lb)   SpO2 95%   BMI 21.95 kg/m    General: The patient is alert and oriented. Appropriate. No acute distress  Psych: pleasant affect, answers questions appropriately  Skin: Color appropriate for race, warm, dry.  Respiratory: The patient does not require supplemental oxygen. Breathing unlabored  GI:  Abdomen soft, nontender to light palpation, no rebound or gaurding  Neuro: CN 2- 12 grossly intact, no deficits in strength and sensation   Extremities: warm and well-perfused    Imaging:   IMPRESSION:   1.  Status post bilateral aortofemoral bypass " grafting.  2.  Status post cholecystectomy.  3.  Mild constipation. No bowel obstruction, colitis, diverticulitis, or appendicitis.  4.  No obstructing renal or ureteral stones. No hydroureteronephrosis.    ASSESSMENT:  Shirley Hendricks is a 59 yoF s/p left carotid endarterectomy on 6/8/2020 re-admitted 6/12 with nausea and vomiting. Slowly improving, potentially related to anesthesia, pain, or pain medications. Mild constipation on CT scan. May benefit from suppository versus PO bowel regimen.     PLAN:  Appreciate cares from Medical team  May benefit from suppository  Okay to discharge from a surgery perspective once tolerating PO intake    Discussed with staff surgeon Dr. Lozano.    Marleen Wright MD  Surgery resident, PGY-4    STAFF: As above.  CT scan was reviewed from yesterday.  Previous aortobifemoral bypass graft looks good.  No intra-abdominal pathology appreciated except for constipation.  Likely home today.  Phone follow-up with me next week.    Chadwick Lozano MD

## 2020-06-11 NOTE — PROGRESS NOTES
Discharge Planner PT   Patient plan for discharge: Home with .  Current status: Independent per nursing and patient on level surfaces, pt not concerned about mobility - feels at her baseline - stairs to enter home with a rail not an issue.  Barriers to return to prior living situation: NA  Recommendations for discharge: Home with family, no assistive device needs.  Rationale for recommendations: Pt & nurse & PT comfortable with no skilled PT services being provided. PT completing orders, but feel free to reach back out if anything changes.       Entered by: Brittany Dressler 06/11/2020 10:26 AM

## 2020-06-11 NOTE — PROGRESS NOTES
Pt down to ED CT for abdominal scan after not being able to tolerate earlier d/t nausea and emesis x1 which has now resolved.

## 2020-06-11 NOTE — TELEPHONE ENCOUNTER
See TE from 6/10, [facility] [place], Dr. Lozano's office.     Pt went back to hospital yesterday for N/V, and she was re-admitted.

## 2020-06-11 NOTE — PROGRESS NOTES
Observation goals:    -diagnostic tests and consults completed and resulted: partially met    -vital signs normal or at patient baseline: not met, BP elevated     -tolerating oral intake to maintain hydration: not met    -adequate pain control on oral analgesics: met     -returns to baseline functional status: met

## 2020-06-11 NOTE — PROGRESS NOTES
Cognitive Concerns/ Orientation : A&O4- calm, flat   BEHAVIOR & AGGRESSION TOOL COLOR: Green  CIWA SCORE: NA   ABNL VS/O2: VSS on RA ex HTN. 183/73- PRN hydralazine given, 162/62. Pt has been unable to take PO blood pressure meds, remaining asymptomatic.   MOBILITY:SBA with GB d/t weakness and ill feelings   PAIN MANAGMENT: Generalized discomforts, per pt nausea has been absent on shift.   DIET: Advanced to full liquid, minimal appetite- ate popsicle and drank water with no N/V.   BOWEL/BLADDER: Continent of B/B, per pt no BM since Sunday   ABNL LAB/BG: Covid reswab completed and processing for asymptomatic. Lipase 63  DRAIN/DEVICES: LUE PIV infusing NS at 100mL/hr   TELEMETRY RHYTHM: NA  SKIN: Bruises on arms and L neck. Left neck surgical incision site RIN, edges approximated, area pink, purple in color, some edema and pain.     TESTS/PROCEDURES: CT abdomen/ Pelvis completed 6/10   D/C DAY/GOALS/PLACE: Pending progress, able to tolerate diet   OTHER IMPORTANT INFO: Nicotine patch R arm. Calls appropriately. Continue to monitor.

## 2020-06-12 ENCOUNTER — TELEPHONE (OUTPATIENT)
Dept: INTERNAL MEDICINE | Facility: CLINIC | Age: 60
End: 2020-06-12

## 2020-06-12 LAB — INTERPRETATION ECG - MUSE: NORMAL

## 2020-06-12 NOTE — DISCHARGE SUMMARY
[facility]    Discharge Summary  Hospitalist    Date of Admission:  6/10/2020  Date of Discharge:  6/11/2020  2:05 PM  Discharging Provider: Jordan Fuller  Date of Service (when I saw the patient): 6/11/20    Discharge Diagnoses   1. Persistent nausea and vomiting, due to recent anesthesia versus constipation  2. Constipation  3. Symptomatic high grade left carotid artery stenosis undergoing a left carotid endarterectomy 6/8/20  4. Hyperlipidemia  5. Peripheral arterial disease with history of aortobifemoral bypass graft and left femoral-popliteal bypass graft  6. Essential Hypertension       History of Present Illness   Shirley Hendricks is a 59 year old female with a history of symptomatic high-grade left carotid stenosis who is status post left carotid endarterectomy with distal facial vein patch angioplasty on 6/8/2020, hyperlipidemia, peripheral arterial disease with patent aortobifemoral bypass graft and left femoropopliteal bypass graft, hypertension, history of tobacco use disorder who presents with intractable nausea and vomiting, weakness and headache since her discharge after postoperative day 1.    Hospital Course   Persistent nausea vomiting  Constipation  --Could be side effect of anesthesia vs secondary to her constipation.  Patient has not had bowel movement for the last 1 week.  -CT abd/pelvis was reviewed.  Previous aortobifemoral bypass graft looks good per Dr. Lozano.  No intra-abdominal pathology appreciated except for constipation.  --Labs including LFTs and lipase done in the ED negative, no leukocytosis  --IV fluids provided, now tolerating diet  --Nausea has resolved.  Will provide Zofran ODT as needed for discharge.  --Initiated bowel regimen with senna-Colace, and MiraLAX as needed.  --Minimize use of narcotic pain medications  --Increase water intake, ambulation  --PT consulted, however patient's generalized weakness did improve prior to discharge.  No skilled  PT needs at this time.  --Patient feeling well, will be discharged home.  Symptoms have resolved.  We will continue to work on constipation with bowel regimen/OTC medication     Symptomatic high grade left carotid artery stenosis undergoing a left carotid endarterectomy 6/8/20  --Was evaluated by Dr. Lozano in the ED who does not feel her current symptoms are related to postoperative problems.   She had not been able to take aspirin, statin and Plavix due to her persistent nausea vomiting resume once verified by pharmacy   --She also has moderate restenosis to 60% on the right post right carotid CEA in the past. This will be followed as an outpatient by Dr. Lozano.       Hyperlipidemia   Her lipid panel on most recent admission revealed an LDL of 74, HDL 45, triglycerides 85, and total cholesterol 136 while on maximum dose Atorvastatin 80 mg daily. Given her carotid disease, she was switched her over to Crestor 40 mg daily  --Resume Crestor      Peripheral arterial disease with history of aortobifemoral bypass graft and left femoral-popliteal bypass graft   She does have chronic but not disabling claudication symptoms that may be somewhat worse in her right leg due to her known SFA occlusion.   --Outpatient follow-up      Essential Hypertension   PTA on Coreg, lisinopril and amlodipine.  Blood pressure in the ER high likely from acute stress and also patient not being able to take her PTA medication  --Resume PTA medications  --Blood pressures remain elevated, however improving and now down to 123/71 at discharge.  --Close PCP follow-up     Tobacco use  COPD, not on home oxygen  Patient used to smoke 0.25 packs per day prior to recent surgery and agreeable to quit smoking.   --Nicotine patches as needed  --Resume PTA Breo inhaler      Jordan Fuller PA-C    Significant Results and Procedures   As documented above    Pending Results   None    Code Status   Full Code       Primary Care Physician   Vasquez TERESA  Kaycee    Physical Exam   Temp: 98.2  F (36.8  C) Temp src: Oral BP: (!) 158/68 Pulse: 62 Heart Rate: 74 Resp: 18 SpO2: 93 % O2 Device: None (Room air)        Vitals:     06/10/20 1216   Weight: 54.4 kg (120 lb)     Vital Signs with Ranges  Temp:  [98.2  F (36.8  C)-98.5  F (36.9  C)] 98.2  F (36.8  C)  Pulse:  [62] 62  Heart Rate:  [64-77] 74  Resp:  [8-18] 18  BP: (144-183)/() 158/68  SpO2:  [93 %-100 %] 93 %  I/O last 3 completed shifts:  In: 950 [I.V.:950]  Out: -      Constitutional: Alert, Cooperative, lying in bed in NAD.   Respiratory:  Lungs CTAB.  No crackles, wheezes, no labored breathing.  Cardiovascular:  Heart RRR, no MRG, no edema.  GI:  Abdomen soft, nontender, mildly distended and with normoactive BS  Skin/Integumen:  Warm, dry, non-diaphoretic.  MSK: CMS x4 intact.    Discharge Disposition   Discharged to home  Condition at discharge: Stable    Consultations This Hospital Stay   PHYSICAL THERAPY ADULT IP CONSULT      Discharge Orders      Reason for your hospital stay    s/p left carotid endarterectomy on 6/8/2020 re-admitted 6/12 with nausea and vomiting.     Follow-up and recommended labs and tests     Follow up with primary care provider, Vasquez Benoit, within 1-2 weeks for hospital follow- up.    Follow up with Dr. Lozano for a phone visit in 1 week.     Activity    Your activity upon discharge: activity as tolerated     Diet    Follow this diet upon discharge: Advance to a regular diet as tolerated     Discharge Medications   Discharge Medication List as of 6/11/2020  1:14 PM      START taking these medications    Details   ondansetron (ZOFRAN-ODT) 4 MG ODT tab Take 1 tablet (4 mg) by mouth every 6 hours as needed for nausea or vomiting, Disp-20 tablet,R-1, E-Prescribe      polyethylene glycol (MIRALAX) 17 g packet Take 17 g by mouth daily as needed for constipation, OTC      senna-docusate (SENOKOT-S/PERICOLACE) 8.6-50 MG tablet Take 1-2 tablets by mouth 2 times daily as needed for  constipation, OTC         CONTINUE these medications which have NOT CHANGED    Details   Acetaminophen (TYLENOL PO) Take 1,000-1,500 mg by mouth every 6 hours as needed for mild pain or fever , Historical      amLODIPine (NORVASC) 5 MG tablet Take 5 mg by mouth every morning, Historical      artificial tears OINT ophthalmic ointment Apply to eyes every 4 hours as needed for comfort., Disp-1 g,R-1, Local Print      ASPIRIN EC PO Take 81 mg by mouth daily, Historical      BREO ELLIPTA 200-25 MCG/INH Inhaler Inhale 1 puff into the lungs daily INDICATION: COPD CONTROLLER, Disp-1 Inhaler,R-4, E-Prescribe      CALCIUM 600-D 600-400 MG-UNIT TABS Take 1 tablet by mouth 2 times daily, Disp-180 tablet,R-2, E-Prescribe      carvedilol 6.25 MG PO tablet Take 1 tablet (6.25 mg) by mouth 2 times daily (with meals), Disp-180 tablet,R-3, E-Prescribe      clopidogrel (PLAVIX) 75 MG tablet Take 1 tablet (75 mg) by mouth daily, Disp-90 tablet,R-3, E-Prescribe      denosumab (PROLIA) 60 MG/ML SOLN injection Inject 1 mL (60 mg) Subcutaneous every 6 months INDICATION: TO TREAT OSTEOPOROSIS, Disp-1 Syringe,R-0, Local Print      diclofenac (VOLTAREN) 0.1 % ophthalmic solution Place 2 drops into the right eye every 6 hours as needed for moderate pain, Disp-2.5 mL,R-1,PAM, Local Print      erythromycin (ROMYCIN) 5 MG/GM ophthalmic ointment Place into the right eye 4 times dailyDisp-1 g,R-1Local Print      lisinopril (ZESTRIL) 20 MG tablet Take 1 tablet (20 mg) by mouth 2 times daily, Disp-180 tablet,R-2, E-Prescribe      nitroGLYcerin (NITROSTAT) 0.4 MG sublingual tablet Place 1 tablet (0.4 mg) under the tongue See Admin Instructions for chest pain, Disp-30 tablet,R-11, E-Prescribe      omeprazole (PRILOSEC) 20 MG DR capsule TAKE ONE CAPSULE BY MOUTH DAILY AS NEEDED, Disp-90 capsule,R-0, E-Prescribe      oxyCODONE (ROXICODONE) 5 MG tablet Take 1 tablet (5 mg) by mouth every 3 hours as needed for moderate to severe pain, Disp-10 tablet,R-0,  Local Print      rosuvastatin (CRESTOR) 40 MG tablet Take 1 tablet (40 mg) by mouth At Bedtime, Disp-30 tablet,R-2, Local Print           Allergies   Allergies   Allergen Reactions     Contrast Dye Anaphylaxis     RASH, FACIAL AND NECK SWELLING, SOB, WHEEZING     Pantoprazole      Protonix caused diffuse edema     Chantix [Varenicline]      Terrible dreams     Penicillins Itching     Data   Most Recent 3 CBC's:  Recent Labs   Lab Test 06/11/20  0810 06/10/20  1243 06/09/20  0654   WBC 5.6 8.3 6.8   HGB 12.4 14.9 11.9   MCV 94 93 94    198 133*      Most Recent 3 BMP's:  Recent Labs   Lab Test 06/11/20  0810 06/10/20  1243 06/09/20  0654    133 131*   POTASSIUM 3.8 4.2 4.2   CHLORIDE 105 98 101   CO2 20 24 25   BUN 10 12 11   CR 0.45* 0.52 0.54   ANIONGAP 11 11 5   SONIA 8.3* 9.3 8.3*   GLC 79 72 89     Most Recent 2 LFT's:  Recent Labs   Lab Test 06/11/20  0810 06/10/20  1243   AST 13 21   ALT 22 30   ALKPHOS 63 82   BILITOTAL 0.6 0.5     Most Recent INR's and Anticoagulation Dosing History:  Anticoagulation Dose History     Recent Dosing and Labs Latest Ref Rng & Units 3/19/2004 8/13/2008 8/6/2009 9/1/2009 3/11/2010 4/21/2016 5/10/2016    INR 0.86 - 1.14 0.94 0.95 0.92 1.02 0.95 1.02 0.95        Most Recent 3 Troponin's:  Recent Labs   Lab Test 06/10/20  1243 02/16/20  1824 09/18/19  0739   TROPI <0.015 <0.015 <0.015     Most Recent Cholesterol Panel:  Recent Labs   Lab Test 06/08/20  1302   CHOL 136   LDL 74   HDL 45*   TRIG 85     Most Recent 6 Bacteria Isolates From Any Culture (See EPIC Reports for Culture Details):No lab results found.  Most Recent TSH, T4 and A1c Labs:  Recent Labs   Lab Test 06/08/20  1302 02/16/20  1824  03/28/17  0922   TSH  --  0.89   < > 1.37   T4  --   --   --  1.19   A1C 5.5  --   --   --     < > = values in this interval not displayed.     Results for orders placed or performed during the hospital encounter of 06/10/20   CT Abdomen Pelvis w/o Contrast    Narrative    EXAM:  CT ABDOMEN PELVIS W/O CONTRAST  LOCATION: [facility]  DATE/TIME: 6/10/2020 9:57 PM    INDICATION: Nausea, vomiting.  COMPARISON: 02/01/2019.  TECHNIQUE: CT scan of the abdomen and pelvis was performed without IV contrast. Multiplanar reformats were obtained. Dose reduction techniques were used.  CONTRAST: None.    FINDINGS: Lack of IV contrast creates sensitivity to visceral and vascular pathology.    LOWER CHEST: Normal.    HEPATOBILIARY: Absent gallbladder.    PANCREAS: Normal.    SPLEEN: Normal.    ADRENAL GLANDS: Unchanged negative attenuation 1.8 x 1.9 cm left adrenal mass favored to represent an adrenal adenoma. Normal right adrenal gland.    KIDNEYS/BLADDER: No hydroureteronephrosis. No obstructing renal or ureteral stones. Normal bladder.    BOWEL: No obstruction, colitis, diverticulitis, or appendicitis. Mild volume retained feces.    LYMPH NODES: Normal.    VASCULATURE: Severe atherosclerotic vascular calcifications. Status post aortofemoral bypass grafting.    PELVIC ORGANS: Normal.    MUSCULOSKELETAL: Normal.      Impression    IMPRESSION:   1.  Status post bilateral aortofemoral bypass grafting.  2.  Status post cholecystectomy.  3.  Mild constipation. No bowel obstruction, colitis, diverticulitis, or appendicitis.  4.  No obstructing renal or ureteral stones. No hydroureteronephrosis.

## 2020-06-12 NOTE — TELEPHONE ENCOUNTER
"Hospital/TCU/ED for chronic condition Discharge Protocol    \"Hi, my name is Simona Noyola RN, a registered nurse, and I am calling from [facility].  I am calling to follow up and see how things are going for you after your recent emergency visit/hospital/TCU stay.\"    Tell me how you are doing now that you are home?\" feeling a lot better. I have the BP were it should be. Had a steak dinner last night and it stayed down. Had a BM this morning after her coffee.       Discharge Instructions    \"Let's review your discharge instructions.  What is/are the follow-up recommendations?  Pt. Response: Follow up with primary care provider, Vasquez Benoit, within 1-2                                 weeks for hospital follow- up.  Follow up with Dr. Lozano for a phone visit in 1 week.    \"Has an appointment with your primary care provider been scheduled?\"   Yes. (confirm)    \"When you see the provider, I would recommend that you bring your medications with you.\"    Medications    \"Tell me what changed about your medicines when you discharged?\"    Changes to chronic meds?    0-1    \"What questions do you have about your medications?\"    None     New diagnoses of heart failure, COPD, diabetes, or MI?    No              Post Discharge Medication Reconciliation Status: discharge medications reconciled, continue medications without change.    Was MTM referral placed (*Make sure to put transitions as reason for referral)?   No    Call Summary    \"What questions or concerns do you have about your recent visit and your follow-up care?\"     none    \"If you have questions or things don't continue to improve, we encourage you contact us through the main clinic number (give number).  Even if the clinic is not open, triage nurses are available 24/7 to help you.     We would like you to know that our clinic has extended hours (provide information).  We also have urgent care (provide details on closest location and hours/contact " "info)\"      \"Thank you for your time and take care!\"         Simona SANTANAN, RN, PHN        "

## 2020-06-16 ENCOUNTER — PATIENT OUTREACH (OUTPATIENT)
Dept: CARE COORDINATION | Facility: CLINIC | Age: 60
End: 2020-06-16

## 2020-06-16 ENCOUNTER — TELEPHONE (OUTPATIENT)
Dept: OTHER | Facility: CLINIC | Age: 60
End: 2020-06-16

## 2020-06-16 DIAGNOSIS — I65.23 BILATERAL CAROTID ARTERY STENOSIS: Primary | ICD-10-CM

## 2020-06-16 DIAGNOSIS — Z20.822 COVID-19 RULED OUT: Primary | ICD-10-CM

## 2020-06-16 DIAGNOSIS — Z71.89 COUNSELING AND COORDINATION OF CARE: ICD-10-CM

## 2020-06-16 NOTE — PROGRESS NOTES
Clinic Care Coordination Contact  [facility]/Voicemail    Referral Source: IP Report  Clinical Data: Care Coordinator Outreach  Outreach attempted x 1.  Left message on patient's voicemail with call back information and requested return call.  Plan: Care Coordinator will try to reach patient again in 1-2 business days.    DEWAYNE Donovan  Clinic Care Coordination  [facility] Clinics: [facility], [facility], and [facility]  Phone: [phone]

## 2020-06-16 NOTE — LETTER
[facility] CARE COORDINATION  [address] [place] [address]        June 17, 2020      Shirley Hendricks  [id number]  [facility] [id number]      Dear Shirley,    I am a clinic community health worker who works with Vasquez Benoit MD at [facility]. I wanted to thank you for spending the time to talk with me.  Below is a description of clinic care coordination and how I can further assist you.      The clinic care coordination team is made up of a registered nurse,  and community health worker who understand the health care system. The goal of clinic care coordination is to help you manage your health and improve access to the health care system in the most efficient manner. The team can assist you in meeting your health care goals by providing education, coordinating services, strengthening the communication among your providers and supporting you with any resource needs.    Please feel free to contact me at [phone] with any questions or concerns. We are focused on providing you with the highest-quality healthcare experience possible and that all starts with you.     Sincerely,     DEWAYNE Donovan  Clinic Care Coordination  [facility]: [facility]  Phone: [phone]

## 2020-06-16 NOTE — TELEPHONE ENCOUNTER
[place] VASCULAR [facility] CENTER    I called Shirley Hendricks today to check on her.  She had a symptomatic significant progressive left carotid stenosis and underwent a left CEA with distal facial vein patch on 6/8/2020.    She did well following the surgery with no recurrent neurological issues.  However she developed severe nausea requiring a visit to the emergency department on 6/10/2020.  She had been unable to keep down liquids and her medications.  She received Zofran and IV hydration.  Laboratory tests were normal.  She is discharged from the emergency department.    She is done well since.  The neck swelling is resolving.  She had chronic migraines and they actually are improved since her surgery.  She continues on her chronic aspirin and Plavix.      Patient also has a history of a prior aortobifemoral bypass graft and left femoral-popliteal bypass graft which is functioning well.  Known occlusion of the right SFA.  By ABIs this was stable but she was noticing more disabling right leg pain with ambulation.  She actually reports that this is somewhat better.  No immediate operative intervention is indicated but likely she may eventually require a femoral-popliteal bypass graft.  She has definitely adequate collateral flow to her leg via the profundus femoral to prevent ulcerations and rest pain.  At the present time we will continue to follow this.      Were concerned about the rapid progression of the left carotid stenosis that became ulcerated and symptomatic.  Also seeing some stenosis of the right carotid bulb following her previous right CEA.  Her LDL= 74 on Lipitor and Dr. Alvarez felt that we should changes to Crestor 40 mg daily to try to optimize this even more to decrease her vascular progression.  She also admits smoking and is strongly encouraged and so far has been successful in quitting this.    As long she remains stable there is no specific follow-up with me until 3 months where of  bilateral carotid duplex ultrasound will be performed.    She has a telephone follow-up with Dr. Alvarez in mid July.    Patient is a  and is presently disabled.  This may be a to be continued for a longer period of time due to her right leg issues.  She will contact our office if paperwork needs to be filled out for her employer.      Chadwick Lozano MD

## 2020-06-17 NOTE — PROGRESS NOTES
Clinic Care Coordination Contact  Community Health Worker Initial Outreach    CHW Initial Information Gathering:  Referral Source: IP Report  Preferred Hospital: [facility]  [phone]  No PCP office visit in Past Year: No    Patient accepts CC: No, patient is not interested at this time, but would like information sent.    Plan: Care Coordinator will send care coordination introduction letter with care coordinator contact information and explanation of care coordination services via [employer]. Care Coordinator will do no further outreaches at this time.    Kiersten Etienne, DEWAYNE  Clinic Care Coordination  [facility] Clinics: [place], [place], and [facility]  Phone: [phone]

## 2020-06-19 ENCOUNTER — VIRTUAL VISIT (OUTPATIENT)
Dept: INTERNAL MEDICINE | Facility: CLINIC | Age: 60
End: 2020-06-19
Payer: COMMERCIAL

## 2020-06-19 VITALS — WEIGHT: 120 LBS | BODY MASS INDEX: 22.08 KG/M2 | HEIGHT: 62 IN

## 2020-06-19 DIAGNOSIS — I73.9 PAD (PERIPHERAL ARTERY DISEASE) (H): ICD-10-CM

## 2020-06-19 DIAGNOSIS — I10 ESSENTIAL HYPERTENSION: ICD-10-CM

## 2020-06-19 DIAGNOSIS — Z98.890 S/P CAROTID ENDARTERECTOMY: ICD-10-CM

## 2020-06-19 DIAGNOSIS — F17.200 TOBACCO USE DISORDER: ICD-10-CM

## 2020-06-19 DIAGNOSIS — E78.5 HYPERLIPIDEMIA LDL GOAL <70: ICD-10-CM

## 2020-06-19 DIAGNOSIS — K21.00 REFLUX ESOPHAGITIS: ICD-10-CM

## 2020-06-19 PROCEDURE — 99214 OFFICE O/P EST MOD 30 MIN: CPT | Mod: TEL | Performed by: INTERNAL MEDICINE

## 2020-06-19 RX ORDER — AMLODIPINE BESYLATE 5 MG/1
5 TABLET ORAL 2 TIMES DAILY
Qty: 180 TABLET | Refills: 3 | Status: SHIPPED | OUTPATIENT
Start: 2020-06-19 | End: 2021-07-01

## 2020-06-19 RX ORDER — ROSUVASTATIN CALCIUM 40 MG/1
40 TABLET, COATED ORAL AT BEDTIME
Qty: 90 TABLET | Refills: 0 | Status: SHIPPED | OUTPATIENT
Start: 2020-06-19 | End: 2020-10-22

## 2020-06-19 ASSESSMENT — MIFFLIN-ST. JEOR: SCORE: 1062.57

## 2020-06-19 NOTE — PROGRESS NOTES
"Shirley Hendricks is a 59 year old female who is being evaluated via a billable telephone visit.      The patient has been notified of following:     \"This telephone visit will be conducted via a call between you and your physician/provider. We have found that certain health care needs can be provided without the need for a physical exam.  This service lets us provide the care you need with a short phone conversation.  If a prescription is necessary we can send it directly to your pharmacy.  If lab work is needed we can place an order for that and you can then stop by our lab to have the test done at a later time.    Telephone visits are billed at different rates depending on your insurance coverage. During this emergency period, for some insurers they may be billed the same as an in-person visit.  Please reach out to your insurance provider with any questions.    If during the course of the call the physician/provider feels a telephone visit is not appropriate, you will not be charged for this service.\"    Patient has given verbal consent for Telephone visit?  Yes    What phone number would you like to be contacted at? [phone]    How would you like to obtain your AVS? Mail a copy    Subjective     Shirley Hendricks is a 59 year old female who presents via phone visit today for the following health issues:    [facility]    Hospital Follow-up Visit:    Hospital/Nursing Home/IP Rehab Facility: [facility]  Date of Admission: 06/10/2020  Date of Discharge: 06/11/2020  Reason(s) for Admission: Constipation      Was your hospitalization related to COVID-19? No   Problems taking medications regularly:  None  Medication changes since discharge: Does not take the zofran   Problems adhering to non-medication therapy:  None    Summary of hospitalization:  [facility] discharge summary reviewed  Diagnostic Tests/Treatments reviewed.  Follow up needed: labs  Other Healthcare Providers Involved in " Patient s Care:          Vascular Surgery  Update since discharge: improved.       Post Discharge Medication Reconciliation: discharge medications reconciled, continue medications without change.  Plan of care communicated with patient               Discharge summary reviewed. Part of the summary as below:    [facility]     Discharge Summary  Hospitalist     Date of Admission:  6/10/2020  Date of Discharge:  6/11/2020  2:05 PM  Discharging Provider: Jordan Fuller  Date of Service (when I saw the patient): 6/11/20        Discharge Diagnoses     1. Persistent nausea and vomiting, due to recent anesthesia versus constipation  2. Constipation  3. Symptomatic high grade left carotid artery stenosis undergoing a left carotid endarterectomy 6/8/20  4. Hyperlipidemia  5. Peripheral arterial disease with history of aortobifemoral bypass graft and left femoral-popliteal bypass graft  6. Essential Hypertension            History of Present Illness     Shirley Hendricks is a 59 year old female with a history of symptomatic high-grade left carotid stenosis who is status post left carotid endarterectomy with distal facial vein patch angioplasty on 6/8/2020, hyperlipidemia, peripheral arterial disease with patent aortobifemoral bypass graft and left femoropopliteal bypass graft, hypertension, history of tobacco use disorder who presents with intractable nausea and vomiting, weakness and headache since her discharge after postoperative day 1.        Hospital Course     Persistent nausea vomiting  Constipation  --Could be side effect of anesthesia vs secondary to her constipation.  Patient has not had bowel movement for the last 1 week.  -CT abd/pelvis was reviewed.  Previous aortobifemoral bypass graft looks good per Dr. Lozano.  No intra-abdominal pathology appreciated except for constipation.  --Labs including LFTs and lipase done in the ED negative, no leukocytosis  --IV fluids provided, now tolerating  diet  --Nausea has resolved.  Will provide Zofran ODT as needed for discharge.  --Initiated bowel regimen with senna-Colace, and MiraLAX as needed.  --Minimize use of narcotic pain medications  --Increase water intake, ambulation  --PT consulted, however patient's generalized weakness did improve prior to discharge.  No skilled PT needs at this time.  --Patient feeling well, will be discharged home.  Symptoms have resolved.  We will continue to work on constipation with bowel regimen/OTC medication     Symptomatic high grade left carotid artery stenosis undergoing a left carotid endarterectomy 6/8/20  --Was evaluated by Dr. Lozano in the ED who does not feel her current symptoms are related to postoperative problems.   She had not been able to take aspirin, statin and Plavix due to her persistent nausea vomiting resume once verified by pharmacy   --She also has moderate restenosis to 60% on the right post right carotid CEA in the past. This will be followed as an outpatient by Dr. Lozano.       Hyperlipidemia   Her lipid panel on most recent admission revealed an LDL of 74, HDL 45, triglycerides 85, and total cholesterol 136 while on maximum dose Atorvastatin 80 mg daily. Given her carotid disease, she was switched her over to Crestor 40 mg daily  --Resume Crestor      Peripheral arterial disease with history of aortobifemoral bypass graft and left femoral-popliteal bypass graft   She does have chronic but not disabling claudication symptoms that may be somewhat worse in her right leg due to her known SFA occlusion.   --Outpatient follow-up      Essential Hypertension   PTA on Coreg, lisinopril and amlodipine.  Blood pressure in the ER high likely from acute stress and also patient not being able to take her PTA medication  --Resume PTA medications  --Blood pressures remain elevated, however improving and now down to 123/71 at discharge.  --Close PCP follow-up     Tobacco use  COPD, not on home oxygen  Patient used to  smoke 0.25 packs per day prior to recent surgery and agreeable to quit smoking.   --Nicotine patches as needed  --Resume PTA Breo inhaler        Jordan Fuller PA-C      Due to the current impact of the Covid19 virus and recommendations by [facility] administration, CDC, etc to limit  clinic visits and pt exposure risk, was recommend pt proceed with virtual phone visit to address acute/stable  medical needs with plan to defer other non-acute health maintenance issues/exam to a future date when less self-isolation is required.    I have reviewed the nursing note as documented above.   See below for other information/data  and my personal notes capturing the substance of my conversation with the patient.       HPI:    Sx improving better every day per pt.   Mild tightness of muscles when turning neck fully to the left. No true ROM limitation  No abd pain now. No N/V.  Eating normally now. Has started smoking 2 cigs/day  On Crestor and will need FLP in 1 month  BPs 132/62. Has been taking Amlodipine 5mg twice a day   Still has  some chronic claudication RLE with moderate walking. Minimal pain at rest or usual ADL activity. Sx chronic and has been seeing Dr Lozano. No pain  Today. Has f/u appt with Dr Alvarez Scheduled in July       Additional ROS:   Constitutional, HEENT, Cardiovascular, Pulmonary, GI and , Neuro, MSK and Psych review of systems/symptoms are otherwise negative or unchanged from previous, except as noted above.      ASSESSMENT:   1. S/P carotid endarterectomy  Initial side effects might be from medication postop but those have resolved.  Patient states wound overall is healing well with minimal skin tightness.  Continue current management    2. PAD (peripheral artery disease) (H)  Chronic.  On Plavix and statin therapy.  Some pain in right lower extremity with walking but basically unchanged per patient compared to the last time she saw Dr. Lozano.  Continue current management and follow-up with vascular  "clinic as scheduled    3. Hyperlipidemia LDL goal <70  Patient statin with vascular history.  Repeat fasting lipid panel on other labs as below in 1 month  - rosuvastatin (CRESTOR) 40 MG tablet; Take 1 tablet (40 mg) by mouth At Bedtime  Dispense: 90 tablet; Refill: 0  - Lipid panel reflex to direct LDL Fasting; Future  - Comprehensive metabolic panel; Future  - CK total; Future    4. Essential hypertension  Controlled.  Continue current medication  - amLODIPine (NORVASC) 5 MG tablet; Take 1 tablet (5 mg) by mouth 2 times daily  Dispense: 180 tablet; Refill: 3    5. Tobacco use disorder  Counseled regarding risk for thrombosis.  Along with worsening vascular disease overall with tobacco use.  Counseled to quit all smoking either \"cold turkey\" or with use of nicotine lozenges as needed    6. Reflux esophagitis  No symptoms with medication.  Discussed how tobacco use can worsen reflux.  Continue PPI therapy for now  - omeprazole (PRILOSEC) 20 MG DR capsule; TAKE ONE CAPSULE BY MOUTH DAILY  Dispense: 90 capsule; Refill: 3      PLAN:   Stop ALL use of cigarettes. May use OTC nicotine lozenge is still tempted  Continue current meds  Prescriptions refilled.    Call  [phone] or use [employer] to schedule a future lab appointment  fasting in 1 month.   For fasting labs, please refrain from eating for 8 hours or more.   Drink 2 glasses of water before your lab appointment. It is fine to take your  oral medications on the morning of the lab test as usual  Follow-up with vascular clinic and Dr Alvarez in 1 month as scheduled      Phone call contact time  Call Started at 9:18 am  Call Ended at 9:32 am  Total minutes: 14min    (Chart documentation was completed, in part, with [employer] voice-recognition software. Even though reviewed, some grammatical, spelling, and word errors may remain.)    Vasquez Benoit MD  Internal Medicine Department  [facility]             "

## 2020-06-20 NOTE — PATIENT INSTRUCTIONS
Stop ALL use of cigarettes. May use OTC nicotine lozenge is still tempted  Continue current meds  Prescriptions refilled.    Call  [phone] or use [employer] to schedule a future lab appointment  fasting in 1 month.   For fasting labs, please refrain from eating for 8 hours or more.   Drink 2 glasses of water before your lab appointment. It is fine to take your  oral medications on the morning of the lab test as usual  Follow-up with vascular clinic and Dr Alvarez in 1 month as scheduled

## 2020-06-22 ENCOUNTER — TELEPHONE (OUTPATIENT)
Dept: OTHER | Facility: CLINIC | Age: 60
End: 2020-06-22

## 2020-06-22 DIAGNOSIS — I73.9 PAD (PERIPHERAL ARTERY DISEASE) (H): Primary | ICD-10-CM

## 2020-06-22 DIAGNOSIS — Z91.041 CONTRAST MEDIA ALLERGY: ICD-10-CM

## 2020-06-22 RX ORDER — METHYLPREDNISOLONE 16 MG/1
TABLET ORAL
Qty: 4 TABLET | Refills: 0 | Status: SHIPPED | OUTPATIENT
Start: 2020-06-22 | End: 2020-09-14

## 2020-06-22 NOTE — PROGRESS NOTES
I called pt and reviewed Medrol and Bendaryl, pt notes understanding.   ESTHER JohnsonN, RN  [facility]  Pretreatment for Contrast Allergy:  ? Medrol 32 mg tablet, Take one tablet 12 hours before procedure:  ___________________________________________  ? Medrol 32 mg tablet, Take one tablet 2 hours before procedure:  ___________________________________________  ? If you have had an allergic reaction (hives) after being pre-treated with Medrol, you will also need to take Benadryl 25-50 mg; Take once 30 minutes-2 hours before procedure: ______________________________________________________

## 2020-06-22 NOTE — TELEPHONE ENCOUNTER
"LOV 6/16/20 with Dr. Lozano:  \"prior aortobifemoral bypass graft and left femoral-popliteal bypass graft which is functioning well.  Known occlusion of the right SFA.  By ABIs this was stable but she was noticing more disabling right leg pain with ambulation.  She actually reports that this is somewhat better.  No immediate operative intervention is indicated but likely she may eventually require a femoral-popliteal bypass graft.  She has definitely adequate collateral flow to her leg via the profundus femoral to prevent ulcerations and rest pain.  At the present time we will continue to follow this.\"    Pt called, left vm noting right leg worse each day with right foot and leg numbness. Inquiring what we can do about it.     Since 6/11/20 right leg symptoms worsening.   Unable to sleep, wake up crying 10/10 pain pins and needles foot and leg.   Right leg coolness, foot tighter in shoe than left.    Upon awakening in a.m. Numbness in foot, have to wait 3-5 minutes until able to walk to bathroom without falling.     Walk awhile then have to stop.   When in hospital bed, heal would be greatly painful with pressure lying in bed.     Pt reports neck from carotid sx doing better, icing it.   No further nauseau since at hospital.    Will discuss with Dr. Blake muniz and call pt back. Pt notes understanding.     ASHLEY Johnson, RN  [facility]     "

## 2020-06-22 NOTE — TELEPHONE ENCOUNTER
[place] [facility]    I called Shirley Hendricks about her worsening right leg symptoms.  Known occlusion of the SFA with a patent aortofemoral bypass graft and left femoral-popliteal bypass graft.  We knew this was a worsening issue with short distance claudication but now she is getting discomfort at night.  No ulcerations.    She had recently undergone a left CEA for symptomatic ulcerated stenosis.  She has been nauseated issues but this is resolved as noted in our recent telephone call.    We will perform a CTA with runoffs to evaluate her aortic graft and also her right leg.  She has developed hives with contrast and will need to be premedicated.  Renal function is normal with serum creatinine= 0.45.    I discussed this with the patient and we will schedule the procedure for this week with appropriate medications for her contrast allergy.      Chadwick Lozano MD

## 2020-06-26 ENCOUNTER — HOSPITAL ENCOUNTER (OUTPATIENT)
Dept: CT IMAGING | Facility: CLINIC | Age: 60
Discharge: HOME | End: 2020-06-26
Attending: SURGERY | Admitting: SURGERY
Payer: COMMERCIAL

## 2020-06-26 DIAGNOSIS — I73.9 PAD (PERIPHERAL ARTERY DISEASE) (H): ICD-10-CM

## 2020-06-26 PROCEDURE — 75635 CT ANGIO ABDOMINAL ARTERIES: CPT

## 2020-06-26 PROCEDURE — 25000125 ZZHC RX 250: Performed by: SURGERY

## 2020-06-26 PROCEDURE — 25000128 H RX IP 250 OP 636: Performed by: SURGERY

## 2020-06-26 PROCEDURE — 40000556 ZZH STATISTIC PERIPHERAL IV START W US GUIDANCE

## 2020-06-26 RX ORDER — IOPAMIDOL 755 MG/ML
100 INJECTION, SOLUTION INTRAVASCULAR ONCE
Status: COMPLETED | OUTPATIENT
Start: 2020-06-26 | End: 2020-06-26

## 2020-06-26 RX ADMIN — SODIUM CHLORIDE 80 ML: 9 INJECTION, SOLUTION INTRAVENOUS at 15:36

## 2020-06-26 RX ADMIN — IOPAMIDOL 100 ML: 755 INJECTION, SOLUTION INTRAVENOUS at 15:35

## 2020-06-27 ENCOUNTER — TELEPHONE (OUTPATIENT)
Dept: OTHER | Facility: CLINIC | Age: 60
End: 2020-06-27

## 2020-06-27 NOTE — TELEPHONE ENCOUNTER
[place] [facility]    I called Shirley Hendricks about her CTA.  She has had worsening right leg claudication at short distances but no rest pain or ulcers.    She has a history of a aortobifemoral bypass graft and left femoral to above-knee popliteal NRTSV bypass using the right greater saphenous vein many years ago and this is all function well.  We knew she had a diseased right SFA suspected was worse due to her symptoms.    She recently underwent a left CEA is doing well from that.  Does have some increasing plaque in the right CEA that we did many years ago.  She had been smoking and we insist that she quit completely and make sure that all risk factors are under maximum control.  Hemoglobin A1c has been normal.  However last LDL= 74 we tried a more aggressive treatment of her statins.    Feel the patient will require surgery.  She has no autogenous saphenous vein left and thus with plan for a right femoral graft to above-knee popliteal bypass using a cadaveric SFA artery.  Patient is 5 foot 2 inches tall.  She would hold her Plavix for 1 week prior to the procedure.    We will try to schedule her surgery in the near future but it does not emergency and there is some difficulty due to the very busy situation with the COVID-19 ongoing pandemic in the hospital and operating room since she is aware of this.    We will give her a release to work so she can return working as a  until her surgery.  She will call on Monday to get this form for her employer.  No specific restrictions to her activities.      Chadwick Lozano MD

## 2020-07-13 ENCOUNTER — TELEPHONE (OUTPATIENT)
Dept: OTHER | Facility: CLINIC | Age: 60
End: 2020-07-13

## 2020-07-13 NOTE — TELEPHONE ENCOUNTER
Pt requesting we fax AVS to:  [phone].     Faxed AVS July 13, 2020.    Right Fax confirmed at 10:14 AM    ASHLEY Johnson, RN

## 2020-07-13 NOTE — TELEPHONE ENCOUNTER
[facility]    Who is the name of the provider?:  Blake      What is the location you see this provider at?: April    Reason for call:  Unable to find in My Chart the after visit summary from the post op visit with Dr. Lozano.    Can we leave a detailed message on this number?  YES

## 2020-07-15 ENCOUNTER — TELEPHONE (OUTPATIENT)
Dept: OTHER | Facility: CLINIC | Age: 60
End: 2020-07-15

## 2020-07-15 DIAGNOSIS — I73.9 PAD (PERIPHERAL ARTERY DISEASE) (H): Primary | ICD-10-CM

## 2020-07-15 NOTE — TELEPHONE ENCOUNTER
I called the patient to schedule a right leg bypass surgery ordered by Dr. Lozano.     Patient states that she would prefer not to schedule the surgery at this time as she is getting ready to retire from her job and has concerns about the amount of time she would need to be off. She states that she would like to schedule this fall but is not ready to yet. She will call us once she is ready to schedule.    Patient was also due for her follow up after a carotid endarterectomy and she has been scheduled for a carotid ultrasound and follow up with Dr. Lozano on 9/10/20.

## 2020-08-27 DIAGNOSIS — Z11.59 ENCOUNTER FOR SCREENING FOR OTHER VIRAL DISEASES: Primary | ICD-10-CM

## 2020-08-27 NOTE — TELEPHONE ENCOUNTER
Addendum:    Pt called today, left vm noting she now wants to schedule right leg bypass surgery, cannot wait any longer. Requesting to speak to .     Will also need letter for work, requesting out of work 3 months for recovery.    Routing to Dr. Lozano's RN Alaina and  Cely for follow up.     ESTHER JohnsonN, RN  [facility] [place]

## 2020-08-31 NOTE — TELEPHONE ENCOUNTER
Pt is scheduled for bilateral carotid US and in person clinic appointment with Dr. Lozano on 9/10/20.  Will review surgery preop instructions with pt at that time, as she is scheduled for surgery on 9/23/20.    Will discuss with Dr. Lozano at 9/10/20 appointment the appropriate length of time for pt to receive time off post procedure.    Alaina Yanez, ESTHERN, RN-[facility] [place]

## 2020-09-06 DIAGNOSIS — F17.200 TOBACCO USE DISORDER: ICD-10-CM

## 2020-09-06 DIAGNOSIS — J44.9 CHRONIC OBSTRUCTIVE PULMONARY DISEASE, UNSPECIFIED COPD TYPE (H): ICD-10-CM

## 2020-09-08 NOTE — PROGRESS NOTES
[place] [facility]ASC[facility]    Shirley Hendricks has a long history of vascular problems.  She has undergone a previous aortobifemoral bypass graft on 3/19/2010 after undergoing on  3/17/2010 left femoral to above-knee popliteal NRTSV bypass using a right greater saphenous vein.      Known diseased right SFA   With progressively worsening claudication symptoms.  I discussed the possibility of a right graft to above-knee popliteal bypass graft using cadaveric SFA artery.      Left CEA for symptomatic high-grade stenosis on 6/8/2020  Right CEA on 5/11/2016    PMH: Medications: Zestril, carvedilol, Prolia, Norvasc, Crestor, Prilosec, Plavix, aspirin,                                     Brio Ellipta inhaler       Unfortunately she is smoking her cigars again.  She knows it is very important that she quit smoking completely.  She has not had any exposure to the COVID 19 was negative on 6/10/2020.      Her pain in her right leg is continued to worsen.  She is now developing rest pain at night.  No ulcerations.  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  Therefore she asked for disability until after she is recovered for surgery which is scheduled in 2 weeks.    Exam: Alert and appropriate.  Normal affect.             Blood pressure 112/70.  Pulse 55             Chest= clear             Cardiovascular= regular rate             +3 pulse in left leg bypass graft with strong biphasic Doppler in the left posterior tibial                         and anterior tibial artery.              Very weak monophasic signals in the right posterior tibial and anterior tibial artery.                       No ulcerations.  Intact sensation.      Carotid duplex today reveals a stable stenosis of the right carotid bulb with a widely patent ICA.  Velocities unchanged at 123 cm/s the distal ICA.,  Carotid stenosis measures 58% versus 61% last  year.  The left carotid enterectomy is widely patent.  Velocities are elevated to 153 cm/s but likely due to change in vessel diameter with no significant stenosis appreciated.        Impression: #1.  Both carotid endarterectomies are patent.  Stable stenosis of the right carotid bulb with no clinical significance.  Follow-up duplex in 1 year.                       #2.  Patent aortobifemoral bypass graft and left femoral to above-knee popliteal NRTSV bypass with good distal flow to the left foot.                        #3.  Worsening symptoms associated with occlusion of the right SFA.  We plan to use a cadaveric SFA artery for bypass from the right femoral limb of the graft to the above-knee popliteal artery on 9/21/2020.  She will hold her Plavix for 1 week.  Has a preoperative physical with Dr. Benoit. this coming week and will have the appropriate COVID-19 testing.  Again discussed the importance of complete smoking cessation.                         #4.  Due to worsening symptoms in her right leg with planned surgery we have given her a note for [employer] he should be excused from work until after she recovers from her surgery.     Chadwick Lozano MD     CC : Dr Vasquez Benoit    Spent 25 minutes with the patient on today's visit.

## 2020-09-10 ENCOUNTER — HOSPITAL ENCOUNTER (OUTPATIENT)
Dept: ULTRASOUND IMAGING | Facility: CLINIC | Age: 60
End: 2020-09-10
Attending: SURGERY
Payer: COMMERCIAL

## 2020-09-10 ENCOUNTER — OFFICE VISIT (OUTPATIENT)
Dept: OTHER | Facility: CLINIC | Age: 60
End: 2020-09-10
Attending: SURGERY
Payer: COMMERCIAL

## 2020-09-10 VITALS — RESPIRATION RATE: 14 BRPM | HEART RATE: 55 BPM | SYSTOLIC BLOOD PRESSURE: 112 MMHG | DIASTOLIC BLOOD PRESSURE: 72 MMHG

## 2020-09-10 DIAGNOSIS — I73.9 PAD (PERIPHERAL ARTERY DISEASE) (H): Primary | ICD-10-CM

## 2020-09-10 DIAGNOSIS — I65.23 BILATERAL CAROTID ARTERY STENOSIS: ICD-10-CM

## 2020-09-10 DIAGNOSIS — Z98.890 HISTORY OF BILATERAL CAROTID ENDARTERECTOMY: ICD-10-CM

## 2020-09-10 PROCEDURE — 99214 OFFICE O/P EST MOD 30 MIN: CPT | Mod: ZP | Performed by: SURGERY

## 2020-09-10 PROCEDURE — 93880 EXTRACRANIAL BILAT STUDY: CPT

## 2020-09-10 PROCEDURE — G0463 HOSPITAL OUTPT CLINIC VISIT: HCPCS

## 2020-09-10 NOTE — LETTER
September 10, 2020      Shirley Hendricks  [address]  [place] [address]        To Whom It May Concern:    Shirley Hendricks has been under my professional medical care. Please excuse her from work starting today, 9/10/20 until approximately 11/4/20.  Shirley will be reassessed at that time to determine if she is able to return to work.      Sincerely,        Chadwick Lozano MD

## 2020-09-10 NOTE — PROGRESS NOTES
"Shirley Hendricks is a 59 year old female who presents for:  Chief Complaint   Patient presents with     RECHECK     Alvaro carotid (10:00VHC; 10:45WRO) history of left carotid endarterectomy on 6/8/20, right carotid endarterectomy on 5/11/16        Vitals:    Vitals:    09/10/20 1030   BP: 112/72   BP Location: Left arm   Patient Position: Chair   Cuff Size: Adult Regular   Pulse: 55   Resp: 14       BMI:  Estimated body mass index is 21.95 kg/m  as calculated from the following:    Height as of 6/19/20: 5' 2\" (1.575 m).    Weight as of 6/19/20: 120 lb (54.4 kg).    Pain Score:  Data Unavailable        Santos Garibay [facility]    "

## 2020-09-11 ENCOUNTER — TELEPHONE (OUTPATIENT)
Dept: OTHER | Facility: CLINIC | Age: 60
End: 2020-09-11

## 2020-09-11 NOTE — TELEPHONE ENCOUNTER
Faxed letter to Татьяна Merida, attn: Akilah per patient request.  [phone].    Confirmed via [employer] on 9/11/20 at 1339.    ESTHER PabonN, RN-[facility] [place]

## 2020-09-11 NOTE — LETTER
September 11, 2020      Shirley Hendricks  [address]  [place] [address]        To Whom It May Concern:    Shirley Hendricks has been under my professional medical care.  I evaluated her in clinic on 9/10/20.  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  She needs a procedure to help restore blood flow to her leg.    Shirley is scheduled for surgery to correct this on 9/23/20.  I am advising Shirley to remain off of work starting today, 9/11/20, until approximately 11/4/20.  We will complete leave of absence forms once we receive them.    Sincerely,        Chadwick Lozano MD

## 2020-09-14 ENCOUNTER — OFFICE VISIT (OUTPATIENT)
Dept: INTERNAL MEDICINE | Facility: CLINIC | Age: 60
End: 2020-09-14
Payer: COMMERCIAL

## 2020-09-14 VITALS
OXYGEN SATURATION: 98 % | HEART RATE: 54 BPM | TEMPERATURE: 98.8 F | BODY MASS INDEX: 21.4 KG/M2 | SYSTOLIC BLOOD PRESSURE: 116 MMHG | RESPIRATION RATE: 16 BRPM | WEIGHT: 117 LBS | DIASTOLIC BLOOD PRESSURE: 76 MMHG

## 2020-09-14 DIAGNOSIS — J44.9 CHRONIC OBSTRUCTIVE PULMONARY DISEASE, UNSPECIFIED COPD TYPE (H): ICD-10-CM

## 2020-09-14 DIAGNOSIS — I25.10 CORONARY ARTERY DISEASE INVOLVING NATIVE CORONARY ARTERY OF NATIVE HEART WITHOUT ANGINA PECTORIS: ICD-10-CM

## 2020-09-14 DIAGNOSIS — E78.5 HYPERLIPIDEMIA LDL GOAL <70: ICD-10-CM

## 2020-09-14 DIAGNOSIS — I73.9 PAD (PERIPHERAL ARTERY DISEASE) (H): ICD-10-CM

## 2020-09-14 DIAGNOSIS — F17.200 TOBACCO USE DISORDER: ICD-10-CM

## 2020-09-14 DIAGNOSIS — Z01.818 PREOP GENERAL PHYSICAL EXAM: Primary | ICD-10-CM

## 2020-09-14 LAB
ALBUMIN SERPL-MCNC: 3.4 G/DL (ref 3.4–5)
ALP SERPL-CCNC: 58 U/L (ref 40–150)
ALT SERPL W P-5'-P-CCNC: 26 U/L (ref 0–50)
ANION GAP SERPL CALCULATED.3IONS-SCNC: 4 MMOL/L (ref 3–14)
AST SERPL W P-5'-P-CCNC: 14 U/L (ref 0–45)
BILIRUB SERPL-MCNC: 0.2 MG/DL (ref 0.2–1.3)
BUN SERPL-MCNC: 12 MG/DL (ref 7–30)
CALCIUM SERPL-MCNC: 8.5 MG/DL (ref 8.5–10.1)
CHLORIDE SERPL-SCNC: 102 MMOL/L (ref 94–109)
CK SERPL-CCNC: 48 U/L (ref 30–225)
CO2 SERPL-SCNC: 26 MMOL/L (ref 20–32)
CREAT SERPL-MCNC: 0.57 MG/DL (ref 0.52–1.04)
ERYTHROCYTE [DISTWIDTH] IN BLOOD BY AUTOMATED COUNT: 13.2 % (ref 10–15)
GFR SERPL CREATININE-BSD FRML MDRD: >90 ML/MIN/{1.73_M2}
GLUCOSE SERPL-MCNC: 91 MG/DL (ref 70–99)
HCT VFR BLD AUTO: 37.8 % (ref 35–47)
HGB BLD-MCNC: 12.3 G/DL (ref 11.7–15.7)
MCH RBC QN AUTO: 30.8 PG (ref 26.5–33)
MCHC RBC AUTO-ENTMCNC: 32.5 G/DL (ref 31.5–36.5)
MCV RBC AUTO: 95 FL (ref 78–100)
PLATELET # BLD AUTO: 158 10E9/L (ref 150–450)
POTASSIUM SERPL-SCNC: 3.9 MMOL/L (ref 3.4–5.3)
PROT SERPL-MCNC: 6.7 G/DL (ref 6.8–8.8)
RBC # BLD AUTO: 4 10E12/L (ref 3.8–5.2)
SODIUM SERPL-SCNC: 132 MMOL/L (ref 133–144)
WBC # BLD AUTO: 4.8 10E9/L (ref 4–11)

## 2020-09-14 PROCEDURE — 85027 COMPLETE CBC AUTOMATED: CPT | Performed by: INTERNAL MEDICINE

## 2020-09-14 PROCEDURE — 99215 OFFICE O/P EST HI 40 MIN: CPT | Performed by: INTERNAL MEDICINE

## 2020-09-14 PROCEDURE — 36415 COLL VENOUS BLD VENIPUNCTURE: CPT | Performed by: INTERNAL MEDICINE

## 2020-09-14 PROCEDURE — 82550 ASSAY OF CK (CPK): CPT | Performed by: INTERNAL MEDICINE

## 2020-09-14 PROCEDURE — 80053 COMPREHEN METABOLIC PANEL: CPT | Performed by: INTERNAL MEDICINE

## 2020-09-14 PROCEDURE — 93000 ELECTROCARDIOGRAM COMPLETE: CPT | Performed by: INTERNAL MEDICINE

## 2020-09-14 NOTE — PATIENT INSTRUCTIONS
Stop Plavix/Clopidogrel  9/16/20and resume after surgery. Continue aspirin  Take  Amlodipine, Carvedilol and Omeprazole with a small sip water the AM of surgery. Hold other oral medications the AM of surgery   Nothing else to eat/drink after midnight prior to surgery   Use Breo inhaler the AM of surgery   Start Nicoderm 14mg daily patch for smoking cessation. Stop ALL use of cigarettes. If desire to smoke still present, then increase to 21mg daily patch  I would recommend you receive an influenza (flu) vaccine  this Fall (October)  Labs as ordered  Call  [phone] or use [employer] to schedule a future lab appointment  fasting in 1 month.   For fasting labs, please refrain from eating for 8 hours or more.   Drink 2 glasses of water before your lab appointment. It is fine to take your  oral medications on the morning of the lab test as usual

## 2020-09-14 NOTE — PROGRESS NOTES
"[address] [address]  Phone: [phone]  Primary Provider: Vasquez Benoit      PREOPERATIVE EVALUATION:  Today's date: 9/14/2020    Shirley Hendricks is a 59 year old female who presents for a preoperative evaluation.    Surgical Information:  Surgery Details 9/13/2020   Surgery/Procedure: Right leg by-pass   Surgery Location: [facility]   Surgeon: Dr. Lozano   Surgery Date: 09-23-20   Time of Surgery: 10:00am   Where patient plans to recover: At home with family     Fax number for surgical facility: Note does not need to be faxed, will be available electronically in Epic.  Type of Anesthesia Anticipated: General    Subjective     HPI related to upcoming procedure:   History of vascular problems.  Has undergone a previous aortobifemoral bypass graft on 3/19/2010 after undergoing on  3/17/2010 left femoral to above-knee popliteal NRTSV bypass using a right greater saphenous vein.     Has known diseased right SFA   now with progressively worsening claudication symptoms. Has rest pain right foot. Currently smoking again. Following consultation with Dr Lozano, hs elected to undergo surgical treatment as above. See below for other medical issues   Hx previous bilateral carotid endarterectomies. Carotid U/S from 9/10/20 at Dr Lozano's office showed \"Carotid duplex today reveals a stable stenosis of the right carotid bulb with a widely patent ICA.  Velocities unchanged at 123 cm/s the distal ICA.,  Carotid stenosis measures 58% versus 61% last year.  The left carotid enterectomy is widely patent.  Velocities are elevated to 153 cm/s but likely due to change in vessel diameter with no significant stenosis appreciated.\"     Preop Questions 9/13/2020   1. Have you ever had a heart attack or stroke? YES  - Ant/lat MI many years ago. Also hx PAD.   2. Have you ever had surgery on your heart or blood vessels, such as a stent placement, a coronary artery " bypass, or surgery on an artery in your head, neck, heart, or legs? YES:-  See surgical hx below   3. Do you have chest pain with activity? No   4. Do you have a history of  heart failure? No   5. Do you currently have a cold, bronchitis or symptoms of other infection? No   6. Do you have a cough, shortness of breath, or wheezing? No   7. Do you or anyone in your family have previous history of blood clots? YES - Brother and son have had blood clots   8. Do you or does anyone in your family have a serious bleeding problem such as prolonged bleeding following surgeries or cuts? No   9. Have you ever had problems with anemia or been told to take iron pills? No   10. Have you had any abnormal blood loss such as black, tarry or bloody stools, or abnormal vaginal bleeding? No   11. Have you ever had a blood transfusion? No   Are you willing to have a blood transfusion if it is medically needed before, during, or after your surgery? Yes   13. Have you or any of your relatives ever had problems with anesthesia? No   14. Do you have sleep apnea, excessive snoring or daytime drowsiness? No   15. Do you have any artifical heart valves or other implanted medical devices like a pacemaker, defibrillator, or continuous glucose monitor? No   16. Do you have artificial joints? No   17. Are you allergic to latex? No   18. Is there any chance that you may be pregnant? No           Patient does not have a Health Care Directive or Living Will: Discussed advance care planning with patient; however, patient declined at this time.    RX monitoring [place] ([place]) reviewed:  reviewed- no concerns         Review of Systems  CONSTITUTIONAL: NEGATIVE for fever, chills. Weight down 10 pounds in 1 year  INTEGUMENTARY/SKIN: NEGATIVE for worrisome rashes, moles or lesions  EYES: NEGATIVE for vision changes or irritation  ENT/MOUTH: NEGATIVE for ear, mouth and throat problems  RESP: NEGATIVE for significant cough or SOB with inhaler therapy.  Smoking 1/2 ppd  BREAST: NEGATIVE for masses, tenderness or discharge  CV: NEGATIVE for chest pain, palpitations. Minimal chronic LLE peripheral edema. See HPI  GI: NEGATIVE for nausea, abdominal pain, heartburn, or change in bowel habits  : NEGATIVE for frequency, dysuria, or hematuria  MUSCULOSKELETAL: NEGATIVE for significant arthralgias or myalgia that limit activity  NEURO: NEGATIVE for weakness, vertigo or paresthesias. Some imbalance with walking ? due to favoring pain in leg. occ headache. CT brain  Feb 2020 reviewed   ENDOCRINE: NEGATIVE for temperature intolerance. On statin therapy  HEME: NEGATIVE for bleeding problems with previous surgery. Slight bruising with ASA/ Plavix  PSYCHIATRIC:  POSITIVE for some stress with  RLE pain issues    Patient Active Problem List    Diagnosis Date Noted     Health Care Home 06/06/2011     Priority: High     EMERGENCY CARE PLAN  Presenting Problem Signs and Symptoms Treatment Plan    Questions or conerns during clinic hours    I will call the clinic directly     Questions or conerns outside clinic hours    I will call the 24 hour nurse line at [phone]    Patient needs to schedule an appointment    I will call the 24 hour scheduling team at [phone] or clinic directly    Same day treatment     I will call the clinic first, nurse line if after hours, urgent care and express care if needed                            DX V65.8 REPLACED WITH 62011 HEALTH CARE HOME (04/08/2013)       History of colonic polyps 02/21/2020     Priority: Medium     SVT (supraventricular tachycardia) (H) 02/21/2020     Priority: Medium     Vitamin C deficiency 08/12/2018     Priority: Medium     Anxiety 12/07/2017     Priority: Medium     Chronic allergic rhinitis due to animal hair and dander 07/20/2017     Priority: Medium     Tobacco use disorder 07/20/2017     Priority: Medium     Chronic obstructive pulmonary disease, unspecified COPD type (H) 07/20/2017     Priority: Medium     S/P  carotid endarterectomy 07/07/2016     Priority: Medium     RIGHT SIDE       Coronary artery disease involving native coronary artery of native heart without angina pectoris 03/03/2016     Priority: Medium     Primary osteoarthritis involving multiple joints 03/03/2016     Priority: Medium     DDD (degenerative disc disease), lumbar 03/03/2016     Priority: Medium     Hyperlipidemia LDL goal <70 07/07/2014     Priority: Medium     PAD (peripheral artery disease) (H) 07/07/2014     Priority: Medium     Essential hypertension 07/07/2014     Priority: Medium     Problem list name updated by automated process. 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 clot     Post-menopausal      Reflux esophagitis 2/04    EGD: esophagitis and medium HH     Uncomplicated asthma      Vitamin C deficiency 8/12/2018     Past Surgical History:   Procedure Laterality Date     ANGIOGRAM       C FABRIC WRAPPING OF ABDOMINAL ANEURYSM       C NONSPECIFIC PROCEDURE  12/00    angioplasty with stent right fem. a.     C NONSPECIFIC PROCEDURE  1987    sinus  surgery     C NONSPECIFIC PROCEDURE  9/2/2009    Emergent left groin exploration with oversewing of bleeding angiographic site. 2. Endarterectomy of common femoral-proximal superficial femoral artery with greater saphenous vein patch angioplasty.   a. [place] of accessory right greater saphenous vein.      C NONSPECIFIC PROCEDURE  8/27/2009    occluded left common iliac and external iliac arteries were successfully revascularized with stenting to 8 and 7 mm      CARDIAC CATHERIZATION  9/3/2009    multivessel CAD without target lesions, med mgmt indicated, preserved ef     ENDARTERECTOMY CAROTID Right 5/11/2016    Procedure: ENDARTERECTOMY CAROTID;  Surgeon: Chadwick Lozano MD;  Location:  OR     ENDARTERECTOMY CAROTID Left 6/8/2020    Procedure: LEFT CAROTID ENDARTERECTOMY with distal facal vein patch  and EEG;  Surgeon: Chadwick Lozano MD;  Location:  OR     GYN SURGERY  left tube    left salpingectomy     LAPAROSCOPIC CHOLECYSTECTOMY N/A 9/27/2017    Procedure: LAPAROSCOPIC CHOLECYSTECTOMY;  LAPAROSCOPIC CHOLECYSTECTOMY;  Surgeon: Jacoby Tapia MD;  Location:  [facility]     ORTHOPEDIC SURGERY      left knee surgery     VASCULAR SURGERY  aoto bi fem  left fem-AK pop     Current Outpatient Medications   Medication Sig Dispense Refill     Acetaminophen (TYLENOL PO) Take 1,000-1,500 mg by mouth every 6 hours as needed for mild pain or fever        amLODIPine (NORVASC) 5 MG tablet Take 1 tablet (5 mg) by mouth 2 times daily 180 tablet 3     ASPIRIN EC PO Take 81 mg by mouth daily       BREO ELLIPTA 200-25 MCG/INH Inhaler Inhale 1 puff into the lungs daily 3 Inhaler 3     CALCIUM 600-D 600-400 MG-UNIT TABS Take 1 tablet by mouth 2 times daily 180 tablet 2     carvedilol 6.25 MG PO tablet Take 1 tablet (6.25 mg) by mouth 2 times daily (with meals) 180 tablet 3     clopidogrel (PLAVIX) 75 MG tablet Take 1 tablet (75 mg) by mouth daily 90 tablet 3     denosumab (PROLIA) 60 MG/ML SOLN injection  Inject 1 mL (60 mg) Subcutaneous every 6 months INDICATION: TO TREAT OSTEOPOROSIS 1 Syringe 0     diclofenac (VOLTAREN) 0.1 % ophthalmic solution Place 2 drops into the right eye every 6 hours as needed for moderate pain 2.5 mL 1     lisinopril (ZESTRIL) 20 MG tablet Take 1 tablet (20 mg) by mouth 2 times daily 180 tablet 2     methylPREDNISolone (MEDROL) 16 MG tablet Take 32 mg by mouth 12 hours before the procedure and repeat 32 mg by mouth 2 hours before the procedure to prevent contrast reaction. 4 tablet 0     nitroGLYcerin (NITROSTAT) 0.4 MG sublingual tablet Place 1 tablet (0.4 mg) under the tongue See Admin Instructions for chest pain 30 tablet 11     omeprazole (PRILOSEC) 20 MG DR capsule TAKE ONE CAPSULE BY MOUTH DAILY 90 capsule 3     polyethylene glycol (MIRALAX) 17 g packet Take 17 g by mouth daily as needed for constipation       rosuvastatin (CRESTOR) 40 MG tablet Take 1 tablet (40 mg) by mouth At Bedtime 90 tablet 0       Allergies   Allergen Reactions     Contrast Dye Anaphylaxis     RASH, FACIAL AND NECK SWELLING, SOB, WHEEZING     Pantoprazole      Protonix caused diffuse edema     Chantix [Varenicline]      Terrible dreams     Penicillins Itching        Social History     Tobacco Use     Smoking status: Current Every Day Smoker     Packs/day: 0.25     Years: 40.00     Pack years: 10.00     Types: Cigarettes     Start date: 1958     Smokeless tobacco: Never Used     Tobacco comment: 1/2 PPD   Substance Use Topics     Alcohol use: Yes     Alcohol/week: 0.0 standard drinks     Comment: x1-2 yr     Family History   Problem Relation Age of Onset     Cancer Mother         bladder     Cardiovascular Father         alive,multiple heart attacks     Diabetes Maternal Grandmother      Lung Cancer Maternal Grandmother      Blood Disease Brother         clotting disorder     History   Drug Use No            Objective   /76   Pulse 54   Temp 98.8  F (37.1  C) (Temporal)   Resp 16   Wt 53.1 kg  (117 lb)   SpO2 98%   BMI 21.40 kg/m    Physical Exam  Eye: PERRL, EOMI  HENT: ear canals and TM's normal and nose and mouth without ulcers or lesions   Neck: no adenopathy. Thyroid normal to palpation. No bruits  Pulm: Lungs clear to auscultation   CV: Regular rates and rhythm  GI: Soft, nontender, Normal active bowel sounds, No hepatosplenomegaly or masses palpable  Ext: Peripheral pulses intact except no palpable PT pr DP pulse right foot. Foot  Normal temp to touch and slightly  erythematous. No foot skin ulcerations. No edema.  Neuro: Normal strength and tone, sensory exam grossly normal      Recent Labs   Lab Test 06/11/20  0810 06/10/20  1243  06/08/20  1302   HGB 12.4 14.9   < > 14.1    198   < > 149*    133   < > 137   POTASSIUM 3.8 4.2   < > 4.1   CR 0.45* 0.52   < > 0.56   A1C  --   --   --  5.5    < > = values in this interval not displayed.        PRE-OP Diagnostics:  Component      Latest Ref Rng & Units 6/8/2020 9/14/2020   Sodium      133 - 144 mmol/L  132 (L)   Potassium      3.4 - 5.3 mmol/L  3.9   Chloride      94 - 109 mmol/L  102   Carbon Dioxide      20 - 32 mmol/L  26   Anion Gap      3 - 14 mmol/L  4   Glucose      70 - 99 mg/dL  91   Urea Nitrogen      7 - 30 mg/dL  12   Creatinine      0.52 - 1.04 mg/dL  0.57   GFR Estimate      >60 mL/min/1.73:m2  >90   GFR Estimate If Black      >60 mL/min/1.73:m2  >90   Calcium      8.5 - 10.1 mg/dL  8.5   Bilirubin Total      0.2 - 1.3 mg/dL  0.2   Albumin      3.4 - 5.0 g/dL  3.4   Protein Total      6.8 - 8.8 g/dL  6.7 (L)   Alkaline Phosphatase      40 - 150 U/L  58   ALT      0 - 50 U/L  26   AST      0 - 45 U/L  14   WBC      4.0 - 11.0 10e9/L  4.8   RBC Count      3.8 - 5.2 10e12/L  4.00   Hemoglobin      11.7 - 15.7 g/dL  12.3   Hematocrit      35.0 - 47.0 %  37.8   MCV      78 - 100 fl  95   MCH      26.5 - 33.0 pg  30.8   MCHC      31.5 - 36.5 g/dL  32.5   RDW      10.0 - 15.0 %  13.2   Platelet Count      150 - 450 10e9/L  158    Cholesterol      <200 mg/dL 136    Triglycerides      <150 mg/dL 85    HDL Cholesterol      >49 mg/dL 45 (L)    LDL Cholesterol Calculated      <100 mg/dL 74    Non HDL Cholesterol      <130 mg/dL 91    SARS-CoV-2 Virus Specimen Source           SARS-CoV-2 PCR Result           SARS-CoV-2 PCR Comment           COVID-19 Virus PCR to [facility] - Source           COVID-19 Virus PCR to [facility] - Result           CK Total      30 - 225 U/L  48     Component      Latest Ref Rng & Units 9/19/2020   Sodium      133 - 144 mmol/L    Potassium      3.4 - 5.3 mmol/L    Chloride      94 - 109 mmol/L    Carbon Dioxide      20 - 32 mmol/L    Anion Gap      3 - 14 mmol/L    Glucose      70 - 99 mg/dL    Urea Nitrogen      7 - 30 mg/dL    Creatinine      0.52 - 1.04 mg/dL    GFR Estimate      >60 mL/min/1.73:m2    GFR Estimate If Black      >60 mL/min/1.73:m2    Calcium      8.5 - 10.1 mg/dL    Bilirubin Total      0.2 - 1.3 mg/dL    Albumin      3.4 - 5.0 g/dL    Protein Total      6.8 - 8.8 g/dL    Alkaline Phosphatase      40 - 150 U/L    ALT      0 - 50 U/L    AST      0 - 45 U/L    WBC      4.0 - 11.0 10e9/L    RBC Count      3.8 - 5.2 10e12/L    Hemoglobin      11.7 - 15.7 g/dL    Hematocrit      35.0 - 47.0 %    MCV      78 - 100 fl    MCH      26.5 - 33.0 pg    MCHC      31.5 - 36.5 g/dL    RDW      10.0 - 15.0 %    Platelet Count      150 - 450 10e9/L    Cholesterol      <200 mg/dL    Triglycerides      <150 mg/dL    HDL Cholesterol      >49 mg/dL    LDL Cholesterol Calculated      <100 mg/dL    Non HDL Cholesterol      <130 mg/dL    SARS-CoV-2 Virus Specimen Source       Nasopharyngeal   SARS-CoV-2 PCR Result       NEGATIVE   SARS-CoV-2 PCR Comment       Testing was performed using the Simplex[employer] COVID-19 Direct Assay on the [employer] Software[employer] MDX . . .   COVID-19 Virus PCR to [facility] - Source       Nasopharyngeal   COVID-19 Virus PCR to [facility] - Result       Test received-See reflex to IDDL test SARS CoV2 (COVID-19) Virus  "RT-PCR   CK Total      30 - 225 U/L       NM Lexiscan stress test heart 2/19/20 showed:\"       The nuclear stress test is abnormal.     There is nontransmural infarction in the mid to distal anterolateral segment(s) of the left ventricle associated with a mild-moderate degree of lydia-infarct ischemia.     Left ventricular function is normal.     The left ventricular ejection fraction at stress is 65%.     There is no prior study for comparison.       EKG 9/14/20: Sinus bradycardia with rate 52.  Old anterior MI.  Some reduced voltage due to pulmonary disease.  No acute ST/T changes         Assessment & Plan    The proposed surgical procedure is considered HIGH risk.    REVISED CARDIAC RISK INDEX  The patient has the following serious cardiovascular risks for perioperative complications:  High risk surgery (>5% cardiac complication risk) = 1 point  Coronary Artery Disease (MI, positive stress test, angina, Qs on EKG) = 1 point    Interpretation: 2 points: Class III (moderate risk - 6.6% complication rate)    - Performs 4 METS exercise without symptoms. See exercise tolerance discussion below       Exercise Tolerance:  Walking in grocery store and climbing stairs in home without chest pain or shortness of breath. Exercise limited by RLE ischemic pain    The patient has the following additional risks and recommendations for perioperative complications:     - No identified additional risk factors other than previously addressed or addressed below    REASON FOR CONSULTATION:  1. Preop general physical exam  - EKG 12-lead complete w/read - Clinics  - CBC with platelets    2. PAD (peripheral artery disease) (H)  See HPI    3. Hyperlipidemia LDL goal <70   Recently had Crestor dose increased 1 month ago  to 40mg daily.  Previous lipids in chart reflected lower dose. Had ordered lipids today but pt not fasting. WIll have recheck in the next few weeks fasting. Continue high dose statin with  PAD  - CK total  - Comprehensive " "metabolic panel    4. Chronic obstructive pulmonary disease, unspecified COPD type (H)  Controlled. Continue Breo inhaler    5. Tobacco use disorder  Counseled re:\" need to stop NOW with PAD and surgical risk. Will start Nicoderm patch    6. Coronary artery disease involving native coronary artery of native heart without angina pectoris  No chest pain with walking 4 METS recently. Stress earlier this year as above with some mild lydia-infarct ischemia      PATIENT INSTRUCTIONS:   Stop Plavix/Clopidogrel  9/16/20and resume after surgery. Continue aspirin  Take  Amlodipine, Carvedilol and Omeprazole with a small sip water the AM of surgery. Hold other oral medications the AM of surgery   Nothing else to eat/drink after midnight prior to surgery   Use Breo inhaler the AM of surgery   Start Nicoderm 14mg daily patch for smoking cessation. Stop ALL use of cigarettes. If desire to smoke still present, then increase to 21mg daily patch  I would recommend you receive an influenza (flu) vaccine  this Fall (October)  Labs as ordered  Call  [phone] or use [employer] to schedule a future lab appointment  fasting in 1 month.   For fasting labs, please refrain from eating for 8 hours or more.   Drink 2 glasses of water before your lab appointment. It is fine to take your  oral medications on the morning of the lab test as usual          RECOMMENDATION:  APPROVAL GIVEN to proceed with proposed procedure, without further diagnostic evaluation.         Signed Electronically by: Vasquez Benoit MD    Copy of this evaluation report is provided to requesting physician.           "

## 2020-09-15 DIAGNOSIS — Z98.890 HISTORY OF BILATERAL CAROTID ENDARTERECTOMY: ICD-10-CM

## 2020-09-15 DIAGNOSIS — I73.9 PAD (PERIPHERAL ARTERY DISEASE) (H): Primary | ICD-10-CM

## 2020-09-15 DIAGNOSIS — I65.23 BILATERAL CAROTID ARTERY STENOSIS: ICD-10-CM

## 2020-09-17 ENCOUNTER — TELEPHONE (OUTPATIENT)
Dept: OTHER | Facility: CLINIC | Age: 60
End: 2020-09-17

## 2020-09-17 NOTE — TELEPHONE ENCOUNTER
Type of surgery: RIGHT FEMORAL GRAFT TO ABOVE-KNEE POPLITEAL BYPASS USING CADAVERIC SUPERFICIAL FEMORAL ARTERY   Location of surgery: [facility]  Date and time of surgery: 9/23/20 @ 10:00 AM  Surgeon: DR. URIOSTEGUI  Pre-Op Appt Date: PT TO SCHEDULE  Post-Op Appt Date: PT TO SCHEDULE   Packet sent out: Yes  Pre-cert/Authorization completed:  Yes  Date: 9/17/20

## 2020-09-19 DIAGNOSIS — Z11.59 ENCOUNTER FOR SCREENING FOR OTHER VIRAL DISEASES: ICD-10-CM

## 2020-09-19 LAB
LABORATORY COMMENT REPORT: NORMAL
SARS-COV-2 RNA SPEC QL NAA+PROBE: NEGATIVE
SARS-COV-2 RNA SPEC QL NAA+PROBE: NORMAL
SPECIMEN SOURCE: NORMAL
SPECIMEN SOURCE: NORMAL

## 2020-09-19 PROCEDURE — U0003 INFECTIOUS AGENT DETECTION BY NUCLEIC ACID (DNA OR RNA); SEVERE ACUTE RESPIRATORY SYNDROME CORONAVIRUS 2 (SARS-COV-2) (CORONAVIRUS DISEASE [COVID-19]), AMPLIFIED PROBE TECHNIQUE, MAKING USE OF HIGH THROUGHPUT TECHNOLOGIES AS DESCRIBED BY CMS-2020-01-R: HCPCS | Performed by: SURGERY

## 2020-09-21 PROBLEM — F32.0 MILD MAJOR DEPRESSION (H): Status: RESOLVED | Noted: 2017-11-07 | Resolved: 2020-09-21

## 2020-09-21 PROBLEM — I65.29 SYMPTOMATIC CAROTID ARTERY STENOSIS: Status: RESOLVED | Noted: 2020-06-04 | Resolved: 2020-09-21

## 2020-09-21 PROBLEM — R11.2 NAUSEA & VOMITING: Status: RESOLVED | Noted: 2020-06-10 | Resolved: 2020-09-21

## 2020-09-22 ENCOUNTER — TELEPHONE (OUTPATIENT)
Dept: OTHER | Facility: CLINIC | Age: 60
End: 2020-09-22

## 2020-09-22 RX ORDER — ACETAMINOPHEN 500 MG
500-1000 TABLET ORAL EVERY 6 HOURS PRN
Status: ON HOLD | COMMUNITY
End: 2022-12-29

## 2020-09-22 NOTE — PROGRESS NOTES
PTA medications updated by Medication Scribe prior to surgery via phone call with patient      -LAST DOSES ENTERED BY NURSE-    Comments:    Medication history sources: Patient, Surescripts and H&P  Medication history source reliability: Good  Adherence assessment: N/A Not Observed    Significant changes made to the medication list:  None      Additional medication history information:   None        Prior to Admission medications    Medication Sig Last Dose Taking? Auth Provider   acetaminophen (TYLENOL) 500 MG tablet Take 500-1,000 mg by mouth every 6 hours as needed for mild pain  at PRN Yes Reported, Patient   amLODIPine (NORVASC) 5 MG tablet Take 1 tablet (5 mg) by mouth 2 times daily  Yes Vasquez Benoit MD   BREO ELLIPTA 200-25 MCG/INH Inhaler Inhale 1 puff into the lungs daily  Patient taking differently: Inhale 1 puff into the lungs daily   Yes Vasquez Benoit MD   CALCIUM PO Take 1 tablet by mouth every evening  at PM Yes Reported, Patient   carvedilol 6.25 MG PO tablet Take 1 tablet (6.25 mg) by mouth 2 times daily (with meals)  Yes Hayden Mason MD   clopidogrel (PLAVIX) 75 MG tablet Take 1 tablet (75 mg) by mouth daily 9/16/2020 Yes Vasquez Benoit MD   denosumab (PROLIA) 60 MG/ML SOLN injection Inject 1 mL (60 mg) Subcutaneous every 6 months INDICATION: TO TREAT OSTEOPOROSIS more than 6 months Yes Vasquez Benoit MD   lisinopril (ZESTRIL) 20 MG tablet Take 1 tablet (20 mg) by mouth 2 times daily  Yes Vasquez Benoit MD   nitroGLYcerin (NITROSTAT) 0.4 MG sublingual tablet Place 1 tablet (0.4 mg) under the tongue See Admin Instructions for chest pain Never used at PRN Yes Vasquez Benoit MD   omeprazole (PRILOSEC) 20 MG DR capsule TAKE ONE CAPSULE BY MOUTH DAILY  Patient taking differently: Take 20 mg by mouth daily TAKE ONE CAPSULE BY MOUTH DAILY  Yes Vasquez Benoit MD   rosuvastatin (CRESTOR) 40 MG tablet Take 1 tablet (40 mg) by mouth At Bedtime  at PM Yes Vasquez Benoit MD

## 2020-09-22 NOTE — TELEPHONE ENCOUNTER
[place] [facility]    I called Shirley Hendricks about her right ABF graft limb to AK Popliteal bypass using a cadaveric SFA tomorrow.  She had no further questions about her surgery.    BMP and CBC testing is unremarkable from 9/14/2020.  COVID 19 testing is negative from 9/19/2020.  6/30/2020 LDL= 74 on her statin.  No change on 9/14/2020 EKG.       Chadwick Lozano MD

## 2020-09-23 ENCOUNTER — HOSPITAL ENCOUNTER (INPATIENT)
Facility: CLINIC | Age: 60
LOS: 2 days | Discharge: HOME | DRG: 254 | End: 2020-09-25
Attending: SURGERY | Admitting: SURGERY
Payer: COMMERCIAL

## 2020-09-23 ENCOUNTER — ANESTHESIA (OUTPATIENT)
Dept: SURGERY | Facility: CLINIC | Age: 60
DRG: 254 | End: 2020-09-23
Payer: COMMERCIAL

## 2020-09-23 ENCOUNTER — APPOINTMENT (OUTPATIENT)
Dept: SURGERY | Facility: PHYSICIAN GROUP | Age: 60
End: 2020-09-23
Payer: COMMERCIAL

## 2020-09-23 ENCOUNTER — SURGERY (OUTPATIENT)
Age: 60
End: 2020-09-23
Payer: COMMERCIAL

## 2020-09-23 ENCOUNTER — ANESTHESIA EVENT (OUTPATIENT)
Dept: SURGERY | Facility: CLINIC | Age: 60
DRG: 254 | End: 2020-09-23
Payer: COMMERCIAL

## 2020-09-23 DIAGNOSIS — I73.9 PAD (PERIPHERAL ARTERY DISEASE) (H): ICD-10-CM

## 2020-09-23 LAB
ABO + RH BLD: NORMAL
ABO + RH BLD: NORMAL
BLD GP AB SCN SERPL QL: NORMAL
BLD PROD TYP BPU: NORMAL
BLOOD BANK CMNT PATIENT-IMP: NORMAL
CHOLEST SERPL-MCNC: 127 MG/DL
CREAT SERPL-MCNC: 0.47 MG/DL (ref 0.52–1.04)
GFR SERPL CREATININE-BSD FRML MDRD: >90 ML/MIN/{1.73_M2}
HBA1C MFR BLD: 5.4 % (ref 0–5.6)
HDLC SERPL-MCNC: 48 MG/DL
LDLC SERPL CALC-MCNC: 62 MG/DL
NONHDLC SERPL-MCNC: 79 MG/DL
NUM BPU REQUESTED: 2
PLATELET # BLD AUTO: 126 10E9/L (ref 150–450)
POTASSIUM SERPL-SCNC: 4.1 MMOL/L (ref 3.4–5.3)
SPECIMEN EXP DATE BLD: NORMAL
TRIGL SERPL-MCNC: 83 MG/DL

## 2020-09-23 PROCEDURE — 35656 BPG FEMORAL-POPLITEAL: CPT | Mod: RT | Performed by: SURGERY

## 2020-09-23 PROCEDURE — 80061 LIPID PANEL: CPT | Performed by: ANESTHESIOLOGY

## 2020-09-23 PROCEDURE — 25000128 H RX IP 250 OP 636: Performed by: REGISTERED NURSE

## 2020-09-23 PROCEDURE — 04CK0ZZ EXTIRPATION OF MATTER FROM RIGHT FEMORAL ARTERY, OPEN APPROACH: ICD-10-PCS | Performed by: SURGERY

## 2020-09-23 PROCEDURE — C1762 CONN TISS, HUMAN(INC FASCIA): HCPCS | Performed by: SURGERY

## 2020-09-23 PROCEDURE — 25800030 ZZH RX IP 258 OP 636: Performed by: REGISTERED NURSE

## 2020-09-23 PROCEDURE — 27210794 ZZH OR GENERAL SUPPLY STERILE: Performed by: SURGERY

## 2020-09-23 PROCEDURE — 25000128 H RX IP 250 OP 636: Performed by: STUDENT IN AN ORGANIZED HEALTH CARE EDUCATION/TRAINING PROGRAM

## 2020-09-23 PROCEDURE — 36415 COLL VENOUS BLD VENIPUNCTURE: CPT | Performed by: SURGERY

## 2020-09-23 PROCEDURE — 25800030 ZZH RX IP 258 OP 636: Performed by: SURGERY

## 2020-09-23 PROCEDURE — 25000128 H RX IP 250 OP 636: Performed by: NURSE ANESTHETIST, CERTIFIED REGISTERED

## 2020-09-23 PROCEDURE — 36000052 ZZH SURGERY LEVEL 2 EA 15 ADDTL MIN: Performed by: SURGERY

## 2020-09-23 PROCEDURE — 25000125 ZZHC RX 250: Performed by: SURGERY

## 2020-09-23 PROCEDURE — 86923 COMPATIBILITY TEST ELECTRIC: CPT | Performed by: SURGERY

## 2020-09-23 PROCEDURE — 71000013 ZZH RECOVERY PHASE 1 LEVEL 1 EA ADDTL HR: Performed by: SURGERY

## 2020-09-23 PROCEDURE — 25000125 ZZHC RX 250: Performed by: REGISTERED NURSE

## 2020-09-23 PROCEDURE — 25000132 ZZH RX MED GY IP 250 OP 250 PS 637: Performed by: STUDENT IN AN ORGANIZED HEALTH CARE EDUCATION/TRAINING PROGRAM

## 2020-09-23 PROCEDURE — 71000012 ZZH RECOVERY PHASE 1 LEVEL 1 FIRST HR: Performed by: SURGERY

## 2020-09-23 PROCEDURE — 99223 1ST HOSP IP/OBS HIGH 75: CPT | Performed by: INTERNAL MEDICINE

## 2020-09-23 PROCEDURE — 83036 HEMOGLOBIN GLYCOSYLATED A1C: CPT | Performed by: INTERNAL MEDICINE

## 2020-09-23 PROCEDURE — 25000566 ZZH SEVOFLURANE, EA 15 MIN: Performed by: SURGERY

## 2020-09-23 PROCEDURE — 25000128 H RX IP 250 OP 636: Performed by: SURGERY

## 2020-09-23 PROCEDURE — 85049 AUTOMATED PLATELET COUNT: CPT | Performed by: SURGERY

## 2020-09-23 PROCEDURE — 37000009 ZZH ANESTHESIA TECHNICAL FEE, EACH ADDTL 15 MIN: Performed by: SURGERY

## 2020-09-23 PROCEDURE — 25000128 H RX IP 250 OP 636: Performed by: ANESTHESIOLOGY

## 2020-09-23 PROCEDURE — 37000008 ZZH ANESTHESIA TECHNICAL FEE, 1ST 30 MIN: Performed by: SURGERY

## 2020-09-23 PROCEDURE — 25800030 ZZH RX IP 258 OP 636: Performed by: ANESTHESIOLOGY

## 2020-09-23 PROCEDURE — 82565 ASSAY OF CREATININE: CPT | Performed by: ANESTHESIOLOGY

## 2020-09-23 PROCEDURE — 25000125 ZZHC RX 250: Performed by: ANESTHESIOLOGY

## 2020-09-23 PROCEDURE — 041K0KL BYPASS RIGHT FEMORAL ARTERY TO POPLITEAL ARTERY WITH NONAUTOLOGOUS TISSUE SUBSTITUTE, OPEN APPROACH: ICD-10-PCS | Performed by: SURGERY

## 2020-09-23 PROCEDURE — 40000170 ZZH STATISTIC PRE-PROCEDURE ASSESSMENT II: Performed by: SURGERY

## 2020-09-23 PROCEDURE — 86850 RBC ANTIBODY SCREEN: CPT | Performed by: SURGERY

## 2020-09-23 PROCEDURE — 25000125 ZZHC RX 250: Performed by: NURSE ANESTHETIST, CERTIFIED REGISTERED

## 2020-09-23 PROCEDURE — 84132 ASSAY OF SERUM POTASSIUM: CPT | Performed by: ANESTHESIOLOGY

## 2020-09-23 PROCEDURE — 25000132 ZZH RX MED GY IP 250 OP 250 PS 637: Performed by: INTERNAL MEDICINE

## 2020-09-23 PROCEDURE — 86901 BLOOD TYPING SEROLOGIC RH(D): CPT | Performed by: SURGERY

## 2020-09-23 PROCEDURE — 25800030 ZZH RX IP 258 OP 636: Performed by: STUDENT IN AN ORGANIZED HEALTH CARE EDUCATION/TRAINING PROGRAM

## 2020-09-23 PROCEDURE — 86900 BLOOD TYPING SEROLOGIC ABO: CPT | Performed by: SURGERY

## 2020-09-23 PROCEDURE — 12000000 ZZH R&B MED SURG/OB

## 2020-09-23 PROCEDURE — 36000050 ZZH SURGERY LEVEL 2 1ST 30 MIN: Performed by: SURGERY

## 2020-09-23 DEVICE — IMPLANTABLE DEVICE: Type: IMPLANTABLE DEVICE | Site: GROIN | Status: FUNCTIONAL

## 2020-09-23 RX ORDER — NICOTINE 21 MG/24HR
1 PATCH, TRANSDERMAL 24 HOURS TRANSDERMAL
Status: DISCONTINUED | OUTPATIENT
Start: 2020-09-23 | End: 2020-09-25 | Stop reason: HOSPADM

## 2020-09-23 RX ORDER — HYDROMORPHONE HYDROCHLORIDE 1 MG/ML
0.3 INJECTION, SOLUTION INTRAMUSCULAR; INTRAVENOUS; SUBCUTANEOUS
Status: DISCONTINUED | OUTPATIENT
Start: 2020-09-23 | End: 2020-09-25 | Stop reason: HOSPADM

## 2020-09-23 RX ORDER — NITROGLYCERIN 0.4 MG/1
0.4 TABLET SUBLINGUAL SEE ADMIN INSTRUCTIONS
Status: DISCONTINUED | OUTPATIENT
Start: 2020-09-23 | End: 2020-09-25 | Stop reason: HOSPADM

## 2020-09-23 RX ORDER — ONDANSETRON 4 MG/1
4 TABLET, ORALLY DISINTEGRATING ORAL EVERY 6 HOURS PRN
Status: DISCONTINUED | OUTPATIENT
Start: 2020-09-23 | End: 2020-09-25 | Stop reason: HOSPADM

## 2020-09-23 RX ORDER — PROPOFOL 10 MG/ML
INJECTION, EMULSION INTRAVENOUS PRN
Status: DISCONTINUED | OUTPATIENT
Start: 2020-09-23 | End: 2020-09-23

## 2020-09-23 RX ORDER — FENTANYL CITRATE 50 UG/ML
INJECTION, SOLUTION INTRAMUSCULAR; INTRAVENOUS PRN
Status: DISCONTINUED | OUTPATIENT
Start: 2020-09-23 | End: 2020-09-23

## 2020-09-23 RX ORDER — SODIUM CHLORIDE 9 MG/ML
INJECTION, SOLUTION INTRAVENOUS CONTINUOUS
Status: DISCONTINUED | OUTPATIENT
Start: 2020-09-23 | End: 2020-09-25 | Stop reason: HOSPADM

## 2020-09-23 RX ORDER — CLOPIDOGREL BISULFATE 75 MG/1
75 TABLET ORAL DAILY
Status: DISCONTINUED | OUTPATIENT
Start: 2020-09-23 | End: 2020-09-25 | Stop reason: HOSPADM

## 2020-09-23 RX ORDER — LIDOCAINE 40 MG/G
CREAM TOPICAL
Status: DISCONTINUED | OUTPATIENT
Start: 2020-09-23 | End: 2020-09-25 | Stop reason: HOSPADM

## 2020-09-23 RX ORDER — LIDOCAINE HYDROCHLORIDE 20 MG/ML
INJECTION, SOLUTION INFILTRATION; PERINEURAL PRN
Status: DISCONTINUED | OUTPATIENT
Start: 2020-09-23 | End: 2020-09-23

## 2020-09-23 RX ORDER — HEPARIN SODIUM 1000 [USP'U]/ML
INJECTION, SOLUTION INTRAVENOUS; SUBCUTANEOUS PRN
Status: DISCONTINUED | OUTPATIENT
Start: 2020-09-23 | End: 2020-09-23

## 2020-09-23 RX ORDER — HYDRALAZINE HYDROCHLORIDE 20 MG/ML
INJECTION INTRAMUSCULAR; INTRAVENOUS PRN
Status: DISCONTINUED | OUTPATIENT
Start: 2020-09-23 | End: 2020-09-23

## 2020-09-23 RX ORDER — CLINDAMYCIN PHOSPHATE 900 MG/50ML
900 INJECTION, SOLUTION INTRAVENOUS
Status: COMPLETED | OUTPATIENT
Start: 2020-09-23 | End: 2020-09-23

## 2020-09-23 RX ORDER — MAGNESIUM HYDROXIDE 1200 MG/15ML
LIQUID ORAL PRN
Status: DISCONTINUED | OUTPATIENT
Start: 2020-09-23 | End: 2020-09-23 | Stop reason: HOSPADM

## 2020-09-23 RX ORDER — SODIUM CHLORIDE, SODIUM LACTATE, POTASSIUM CHLORIDE, CALCIUM CHLORIDE 600; 310; 30; 20 MG/100ML; MG/100ML; MG/100ML; MG/100ML
INJECTION, SOLUTION INTRAVENOUS CONTINUOUS
Status: DISCONTINUED | OUTPATIENT
Start: 2020-09-23 | End: 2020-09-23 | Stop reason: HOSPADM

## 2020-09-23 RX ORDER — CARVEDILOL 6.25 MG/1
6.25 TABLET ORAL 2 TIMES DAILY WITH MEALS
Status: DISCONTINUED | OUTPATIENT
Start: 2020-09-23 | End: 2020-09-25 | Stop reason: HOSPADM

## 2020-09-23 RX ORDER — ONDANSETRON 2 MG/ML
INJECTION INTRAMUSCULAR; INTRAVENOUS PRN
Status: DISCONTINUED | OUTPATIENT
Start: 2020-09-23 | End: 2020-09-23

## 2020-09-23 RX ORDER — ACETAMINOPHEN 325 MG/1
650 TABLET ORAL EVERY 4 HOURS PRN
Status: DISCONTINUED | OUTPATIENT
Start: 2020-09-26 | End: 2020-09-25 | Stop reason: HOSPADM

## 2020-09-23 RX ORDER — NALOXONE HYDROCHLORIDE 0.4 MG/ML
.1-.4 INJECTION, SOLUTION INTRAMUSCULAR; INTRAVENOUS; SUBCUTANEOUS
Status: DISCONTINUED | OUTPATIENT
Start: 2020-09-23 | End: 2020-09-23

## 2020-09-23 RX ORDER — CEFAZOLIN SODIUM 1 G/3ML
1 INJECTION, POWDER, FOR SOLUTION INTRAMUSCULAR; INTRAVENOUS EVERY 8 HOURS
Status: COMPLETED | OUTPATIENT
Start: 2020-09-23 | End: 2020-09-24

## 2020-09-23 RX ORDER — MEPERIDINE HYDROCHLORIDE 25 MG/ML
12.5 INJECTION INTRAMUSCULAR; INTRAVENOUS; SUBCUTANEOUS EVERY 5 MIN PRN
Status: DISCONTINUED | OUTPATIENT
Start: 2020-09-23 | End: 2020-09-23 | Stop reason: HOSPADM

## 2020-09-23 RX ORDER — BUPIVACAINE HYDROCHLORIDE 5 MG/ML
INJECTION, SOLUTION PERINEURAL PRN
Status: DISCONTINUED | OUTPATIENT
Start: 2020-09-23 | End: 2020-09-23 | Stop reason: HOSPADM

## 2020-09-23 RX ORDER — HYDROMORPHONE HYDROCHLORIDE 1 MG/ML
.3-.5 INJECTION, SOLUTION INTRAMUSCULAR; INTRAVENOUS; SUBCUTANEOUS EVERY 5 MIN PRN
Status: DISCONTINUED | OUTPATIENT
Start: 2020-09-23 | End: 2020-09-23 | Stop reason: HOSPADM

## 2020-09-23 RX ORDER — PROPOFOL 10 MG/ML
INJECTION, EMULSION INTRAVENOUS CONTINUOUS PRN
Status: DISCONTINUED | OUTPATIENT
Start: 2020-09-23 | End: 2020-09-23

## 2020-09-23 RX ORDER — ONDANSETRON 4 MG/1
4 TABLET, ORALLY DISINTEGRATING ORAL EVERY 30 MIN PRN
Status: DISCONTINUED | OUTPATIENT
Start: 2020-09-23 | End: 2020-09-23 | Stop reason: HOSPADM

## 2020-09-23 RX ORDER — ONDANSETRON 2 MG/ML
4 INJECTION INTRAMUSCULAR; INTRAVENOUS EVERY 30 MIN PRN
Status: DISCONTINUED | OUTPATIENT
Start: 2020-09-23 | End: 2020-09-23 | Stop reason: HOSPADM

## 2020-09-23 RX ORDER — FENTANYL CITRATE 50 UG/ML
25-50 INJECTION, SOLUTION INTRAMUSCULAR; INTRAVENOUS
Status: DISCONTINUED | OUTPATIENT
Start: 2020-09-23 | End: 2020-09-23 | Stop reason: HOSPADM

## 2020-09-23 RX ORDER — BUPIVACAINE HYDROCHLORIDE 5 MG/ML
INJECTION, SOLUTION EPIDURAL; INTRACAUDAL
Status: DISCONTINUED
Start: 2020-09-23 | End: 2020-09-23 | Stop reason: HOSPADM

## 2020-09-23 RX ORDER — AMLODIPINE BESYLATE 5 MG/1
5 TABLET ORAL 2 TIMES DAILY
Status: DISCONTINUED | OUTPATIENT
Start: 2020-09-23 | End: 2020-09-25 | Stop reason: HOSPADM

## 2020-09-23 RX ORDER — SCOLOPAMINE TRANSDERMAL SYSTEM 1 MG/1
1 PATCH, EXTENDED RELEASE TRANSDERMAL
Status: DISCONTINUED | OUTPATIENT
Start: 2020-09-23 | End: 2020-09-23 | Stop reason: HOSPADM

## 2020-09-23 RX ORDER — OXYCODONE HYDROCHLORIDE 5 MG/1
5-10 TABLET ORAL
Status: DISCONTINUED | OUTPATIENT
Start: 2020-09-23 | End: 2020-09-25 | Stop reason: HOSPADM

## 2020-09-23 RX ORDER — HEPARIN SODIUM 1000 [USP'U]/ML
INJECTION, SOLUTION INTRAVENOUS; SUBCUTANEOUS
Status: DISCONTINUED
Start: 2020-09-23 | End: 2020-09-23 | Stop reason: HOSPADM

## 2020-09-23 RX ORDER — EPHEDRINE SULFATE 50 MG/ML
INJECTION, SOLUTION INTRAMUSCULAR; INTRAVENOUS; SUBCUTANEOUS PRN
Status: DISCONTINUED | OUTPATIENT
Start: 2020-09-23 | End: 2020-09-23

## 2020-09-23 RX ORDER — HEPARIN SODIUM 1000 [USP'U]/ML
INJECTION, SOLUTION INTRAVENOUS; SUBCUTANEOUS PRN
Status: DISCONTINUED | OUTPATIENT
Start: 2020-09-23 | End: 2020-09-23 | Stop reason: HOSPADM

## 2020-09-23 RX ORDER — DIMENHYDRINATE 50 MG/ML
12.5 INJECTION, SOLUTION INTRAMUSCULAR; INTRAVENOUS
Status: DISCONTINUED | OUTPATIENT
Start: 2020-09-23 | End: 2020-09-23 | Stop reason: HOSPADM

## 2020-09-23 RX ORDER — ACETAMINOPHEN 325 MG/1
975 TABLET ORAL EVERY 8 HOURS
Status: DISCONTINUED | OUTPATIENT
Start: 2020-09-23 | End: 2020-09-25 | Stop reason: HOSPADM

## 2020-09-23 RX ORDER — NALOXONE HYDROCHLORIDE 0.4 MG/ML
.1-.4 INJECTION, SOLUTION INTRAMUSCULAR; INTRAVENOUS; SUBCUTANEOUS
Status: DISCONTINUED | OUTPATIENT
Start: 2020-09-23 | End: 2020-09-25 | Stop reason: HOSPADM

## 2020-09-23 RX ORDER — ONDANSETRON 2 MG/ML
4 INJECTION INTRAMUSCULAR; INTRAVENOUS EVERY 6 HOURS PRN
Status: DISCONTINUED | OUTPATIENT
Start: 2020-09-23 | End: 2020-09-25 | Stop reason: HOSPADM

## 2020-09-23 RX ORDER — OXYCODONE HYDROCHLORIDE 5 MG/1
5-10 TABLET ORAL
Qty: 20 TABLET | Refills: 0 | Status: SHIPPED | OUTPATIENT
Start: 2020-09-23 | End: 2021-01-05

## 2020-09-23 RX ORDER — DEXAMETHASONE SODIUM PHOSPHATE 4 MG/ML
INJECTION, SOLUTION INTRA-ARTICULAR; INTRALESIONAL; INTRAMUSCULAR; INTRAVENOUS; SOFT TISSUE PRN
Status: DISCONTINUED | OUTPATIENT
Start: 2020-09-23 | End: 2020-09-23

## 2020-09-23 RX ORDER — ROSUVASTATIN CALCIUM 20 MG/1
40 TABLET, COATED ORAL AT BEDTIME
Status: DISCONTINUED | OUTPATIENT
Start: 2020-09-23 | End: 2020-09-25 | Stop reason: HOSPADM

## 2020-09-23 RX ADMIN — HEPARIN SODIUM 1000 ML: 10000 INJECTION INTRAVENOUS; SUBCUTANEOUS at 10:36

## 2020-09-23 RX ADMIN — SCOPALAMINE 1 PATCH: 1 PATCH, EXTENDED RELEASE TRANSDERMAL at 09:02

## 2020-09-23 RX ADMIN — Medication 5 MG: at 10:28

## 2020-09-23 RX ADMIN — HYDROMORPHONE HYDROCHLORIDE 0.5 MG: 1 INJECTION, SOLUTION INTRAMUSCULAR; INTRAVENOUS; SUBCUTANEOUS at 10:38

## 2020-09-23 RX ADMIN — ROCURONIUM BROMIDE 10 MG: 10 INJECTION INTRAVENOUS at 10:33

## 2020-09-23 RX ADMIN — FENTANYL CITRATE 100 MCG: 50 INJECTION, SOLUTION INTRAMUSCULAR; INTRAVENOUS at 10:12

## 2020-09-23 RX ADMIN — PROPOFOL 50 MG: 10 INJECTION, EMULSION INTRAVENOUS at 12:40

## 2020-09-23 RX ADMIN — SUGAMMADEX 125 MG: 100 INJECTION, SOLUTION INTRAVENOUS at 13:13

## 2020-09-23 RX ADMIN — ROCURONIUM BROMIDE 10 MG: 10 INJECTION INTRAVENOUS at 12:42

## 2020-09-23 RX ADMIN — ACETAMINOPHEN 975 MG: 325 TABLET, FILM COATED ORAL at 16:11

## 2020-09-23 RX ADMIN — HYDROMORPHONE HYDROCHLORIDE 0.3 MG: 1 INJECTION, SOLUTION INTRAMUSCULAR; INTRAVENOUS; SUBCUTANEOUS at 15:39

## 2020-09-23 RX ADMIN — PHENYLEPHRINE HYDROCHLORIDE 50 MCG: 10 INJECTION INTRAVENOUS at 10:28

## 2020-09-23 RX ADMIN — HYDRALAZINE HYDROCHLORIDE 10 MG: 20 INJECTION INTRAMUSCULAR; INTRAVENOUS at 10:47

## 2020-09-23 RX ADMIN — ROSUVASTATIN CALCIUM 40 MG: 20 TABLET, FILM COATED ORAL at 20:16

## 2020-09-23 RX ADMIN — PHENYLEPHRINE HYDROCHLORIDE 50 MCG: 10 INJECTION INTRAVENOUS at 11:30

## 2020-09-23 RX ADMIN — PROPOFOL 30 MCG/KG/MIN: 10 INJECTION, EMULSION INTRAVENOUS at 10:16

## 2020-09-23 RX ADMIN — PHENYLEPHRINE HYDROCHLORIDE 50 MCG: 10 INJECTION INTRAVENOUS at 10:35

## 2020-09-23 RX ADMIN — SODIUM CHLORIDE, POTASSIUM CHLORIDE, SODIUM LACTATE AND CALCIUM CHLORIDE: 600; 310; 30; 20 INJECTION, SOLUTION INTRAVENOUS at 09:36

## 2020-09-23 RX ADMIN — BUPIVACAINE HYDROCHLORIDE 25 ML: 5 INJECTION, SOLUTION PERINEURAL at 13:04

## 2020-09-23 RX ADMIN — PHENYLEPHRINE HYDROCHLORIDE 50 MCG: 10 INJECTION INTRAVENOUS at 10:20

## 2020-09-23 RX ADMIN — SODIUM CHLORIDE, POTASSIUM CHLORIDE, SODIUM LACTATE AND CALCIUM CHLORIDE: 600; 310; 30; 20 INJECTION, SOLUTION INTRAVENOUS at 11:31

## 2020-09-23 RX ADMIN — PROPOFOL 150 MG: 10 INJECTION, EMULSION INTRAVENOUS at 10:12

## 2020-09-23 RX ADMIN — HYDROMORPHONE HYDROCHLORIDE 0.3 MG: 1 INJECTION, SOLUTION INTRAMUSCULAR; INTRAVENOUS; SUBCUTANEOUS at 20:26

## 2020-09-23 RX ADMIN — DEXAMETHASONE SODIUM PHOSPHATE 4 MG: 4 INJECTION, SOLUTION INTRA-ARTICULAR; INTRALESIONAL; INTRAMUSCULAR; INTRAVENOUS; SOFT TISSUE at 10:20

## 2020-09-23 RX ADMIN — AMLODIPINE BESYLATE 5 MG: 5 TABLET ORAL at 20:16

## 2020-09-23 RX ADMIN — OMEPRAZOLE 20 MG: 20 CAPSULE, DELAYED RELEASE ORAL at 16:11

## 2020-09-23 RX ADMIN — HEPARIN SODIUM 250 ML: 1000 INJECTION INTRAVENOUS; SUBCUTANEOUS at 10:36

## 2020-09-23 RX ADMIN — Medication 10 MG: at 11:07

## 2020-09-23 RX ADMIN — ONDANSETRON 4 MG: 2 INJECTION INTRAMUSCULAR; INTRAVENOUS at 12:55

## 2020-09-23 RX ADMIN — PHENYLEPHRINE HYDROCHLORIDE 0.2 MCG/KG/MIN: 10 INJECTION INTRAVENOUS at 11:36

## 2020-09-23 RX ADMIN — DEXMEDETOMIDINE HYDROCHLORIDE 4 MCG: 100 INJECTION, SOLUTION INTRAVENOUS at 11:58

## 2020-09-23 RX ADMIN — CLOPIDOGREL BISULFATE 75 MG: 75 TABLET, FILM COATED ORAL at 16:11

## 2020-09-23 RX ADMIN — SODIUM CHLORIDE, POTASSIUM CHLORIDE, SODIUM LACTATE AND CALCIUM CHLORIDE: 600; 310; 30; 20 INJECTION, SOLUTION INTRAVENOUS at 10:05

## 2020-09-23 RX ADMIN — OXYCODONE HYDROCHLORIDE 5 MG: 5 TABLET ORAL at 17:23

## 2020-09-23 RX ADMIN — DEXMEDETOMIDINE HYDROCHLORIDE 8 MCG: 100 INJECTION, SOLUTION INTRAVENOUS at 12:43

## 2020-09-23 RX ADMIN — HYDROMORPHONE HYDROCHLORIDE 0.5 MG: 1 INJECTION, SOLUTION INTRAMUSCULAR; INTRAVENOUS; SUBCUTANEOUS at 13:48

## 2020-09-23 RX ADMIN — PHENYLEPHRINE HYDROCHLORIDE 50 MCG: 10 INJECTION INTRAVENOUS at 11:23

## 2020-09-23 RX ADMIN — CARVEDILOL 6.25 MG: 6.25 TABLET, FILM COATED ORAL at 17:23

## 2020-09-23 RX ADMIN — SODIUM CHLORIDE: 9 INJECTION, SOLUTION INTRAVENOUS at 16:11

## 2020-09-23 RX ADMIN — FENTANYL CITRATE 50 MCG: 0.05 INJECTION, SOLUTION INTRAMUSCULAR; INTRAVENOUS at 13:59

## 2020-09-23 RX ADMIN — NICOTINE 1 PATCH: 21 PATCH, EXTENDED RELEASE TRANSDERMAL at 16:53

## 2020-09-23 RX ADMIN — CLINDAMYCIN PHOSPHATE 900 MG: 900 INJECTION, SOLUTION INTRAVENOUS at 10:17

## 2020-09-23 RX ADMIN — ROCURONIUM BROMIDE 10 MG: 10 INJECTION INTRAVENOUS at 12:08

## 2020-09-23 RX ADMIN — SODIUM CHLORIDE 1000 ML: 900 IRRIGANT IRRIGATION at 10:36

## 2020-09-23 RX ADMIN — OXYCODONE HYDROCHLORIDE 5 MG: 5 TABLET ORAL at 18:07

## 2020-09-23 RX ADMIN — DEXMEDETOMIDINE HYDROCHLORIDE 0.4 MCG/KG/HR: 100 INJECTION, SOLUTION INTRAVENOUS at 10:16

## 2020-09-23 RX ADMIN — HEPARIN SODIUM 5000 UNITS: 1000 INJECTION INTRAVENOUS; SUBCUTANEOUS at 11:30

## 2020-09-23 RX ADMIN — MIDAZOLAM 2 MG: 1 INJECTION INTRAMUSCULAR; INTRAVENOUS at 10:05

## 2020-09-23 RX ADMIN — PHENYLEPHRINE HYDROCHLORIDE 50 MCG: 10 INJECTION INTRAVENOUS at 11:16

## 2020-09-23 RX ADMIN — ROCURONIUM BROMIDE 30 MG: 10 INJECTION INTRAVENOUS at 10:12

## 2020-09-23 RX ADMIN — LIDOCAINE HYDROCHLORIDE 100 MG: 20 INJECTION, SOLUTION INFILTRATION; PERINEURAL at 10:12

## 2020-09-23 RX ADMIN — CEFAZOLIN 1 G: 1 INJECTION, POWDER, FOR SOLUTION INTRAMUSCULAR; INTRAVENOUS at 17:23

## 2020-09-23 ASSESSMENT — ACTIVITIES OF DAILY LIVING (ADL)
ADLS_ACUITY_SCORE: 15
ADLS_ACUITY_SCORE: 15

## 2020-09-23 ASSESSMENT — LIFESTYLE VARIABLES: TOBACCO_USE: 1

## 2020-09-23 ASSESSMENT — MIFFLIN-ST. JEOR: SCORE: 1044.42

## 2020-09-23 ASSESSMENT — ENCOUNTER SYMPTOMS: DYSRHYTHMIAS: 1

## 2020-09-23 ASSESSMENT — COPD QUESTIONNAIRES: COPD: 1

## 2020-09-23 NOTE — PROVIDER NOTIFICATION
Dr. Alvarez paged: Patient requesting nicotine patch while in hospital. Can we get order?     1634: Dr. Alvarez placed orders for nicotine patch.

## 2020-09-23 NOTE — ANESTHESIA PREPROCEDURE EVALUATION
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 Simons dermatology     Embolism and thrombosis of unspecified artery (H)     Protein C,S, Leiden FV, Lupus Inhibitor Negative     Gastroesophageal reflux disease      Hyperlipidaemia      Hypertension      Lupus (H)     skin     Mild major depression (H) 2017     Myocardial infarction (H)     x3     Osteoarthrosis, unspecified whether generalized or localized, unspecified site      PAD (peripheral artery disease) (H)      Peripheral vascular disease, unspecified (H)     s/p angioplasty with stent right femoral a.; Right iliac and femoral a. clot     Post-menopausal      Reflux esophagitis     EGD: esophagitis and medium HH     Uncomplicated asthma      Vitamin C deficiency 2018     Past Surgical History:   Procedure Laterality Date     ANGIOGRAM       C FABRIC WRAPPING OF ABDOMINAL ANEURYSM       C NONSPECIFIC PROCEDURE      angioplasty with stent right fem. a.     C NONSPECIFIC PROCEDURE      sinus surgery     C NONSPECIFIC PROCEDURE  2009    Emergent left groin exploration with oversewing of bleeding angiographic site. 2. Endarterectomy of common femoral-proximal superficial femoral artery with greater saphenous vein patch angioplasty.   a. [place] of accessory right greater saphenous vein.      C NONSPECIFIC PROCEDURE  2009    occluded left common iliac and external iliac arteries were successfully revascularized with stenting to 8 and 7 mm      CARDIAC CATHERIZATION  9/3/2009     "multivessel CAD without target lesions, med mgmt indicated, preserved ef     ENDARTERECTOMY CAROTID Right 5/11/2016    Procedure: ENDARTERECTOMY CAROTID;  Surgeon: Chadwick Lozano MD;  Location:  OR     ENDARTERECTOMY CAROTID Left 6/8/2020    Procedure: LEFT CAROTID ENDARTERECTOMY with distal facal vein patch  and EEG;  Surgeon: Chadwick Lozano MD;  Location:  OR     GYN SURGERY  left tube    left salpingectomy     LAPAROSCOPIC CHOLECYSTECTOMY N/A 9/27/2017    Procedure: LAPAROSCOPIC CHOLECYSTECTOMY;  LAPAROSCOPIC CHOLECYSTECTOMY;  Surgeon: Jacoby Tapia MD;  Location:  [facility]     ORTHOPEDIC SURGERY      left knee surgery     VASCULAR SURGERY  aoto bi fem  left fem-AK pop       Anesthesia Evaluation     . Pt has had prior anesthetic. Type of anesthetic: [place] with one anesthetic.    No history of anesthetic complications          ROS/MED HX    ENT/Pulmonary:     (+)tobacco use, asthma COPD, , . .    Neurologic: Comment: \"Hx previous bilateral carotid endarterectomies. Carotid U/S from 9/10/20 at Dr Lozano's office showed \"Carotid duplex today reveals a stable stenosis of the right carotid bulb with a widely patent ICA.  Velocities unchanged at 123 cm/s the distal ICA.,  Carotid stenosis measures 58% versus 61% last year\"      Cardiovascular: Comment: H/o CAD, SVT. H/o Peripheral vascular disease - S/p aortobifemoral bypass graft, s/p fem-popliteal bypass.    Saw cardiology 2/21/20. Per that note:  \"Patient is scheduled to undergo colonoscopy and clearance has been requested due to significant cardiac history.  The patient recently underwent nuclear stress test which showed an a small sized infarct in the anterolateral wall with mild lydia-infarct ischemia.  EF of 65%. This was different in the sense that previous nuclear stress test had shown ischemia but no infarct.  Coronary angiogram performed after that nuclear stress test showed mild to moderate, diffuse disease (30-50%) in mid segment " "of small caliber RCA and mid LAD in addition to total cclusion of 1 mm D1.  Medical management was recommended.  She also underwent a ZIO Patch recently which showed 3 NSVT events, longest 10 beats and 47 SVT runs, longest 20 beats.  No symptoms.  Last echocardiogram from 2016 showed normal LV size and systolic function, no WMA, no valvular disease.  EKG showed lower voltage complex, septal infarct but no ST-T wave changes or Q waves.\"    No chest pain, walks greater than 4 mets.    (+) hypertension--CAD, -past MI,-. : . . . :. dysrhythmias Other, .       METS/Exercise Tolerance:     Hematologic:     (+) History of blood clots -      Musculoskeletal:         GI/Hepatic:     (+) GERD       Renal/Genitourinary:  - ROS Renal section negative       Endo:  - neg endo ROS       Psychiatric: Comment: H/o anxiety.        Infectious Disease:  - neg infectious disease ROS       Malignancy:      - no malignancy   Other:                          Physical Exam  Normal systems: cardiovascular, pulmonary and dental    Airway   Mallampati: II    Dental     Cardiovascular       Pulmonary             Lab Results   Component Value Date    WBC 4.8 09/14/2020    HGB 12.3 09/14/2020    HCT 37.8 09/14/2020     09/14/2020    CRP <2.9 09/18/2014    SED 9 12/06/2019     (L) 09/14/2020    POTASSIUM 3.9 09/14/2020    CHLORIDE 102 09/14/2020    CO2 26 09/14/2020    BUN 12 09/14/2020    CR 0.57 09/14/2020    GLC 91 09/14/2020    SONIA 8.5 09/14/2020    PHOS 4.3 09/13/2018    MAG 1.8 09/13/2018    ALBUMIN 3.4 09/14/2020    PROTTOTAL 6.7 (L) 09/14/2020    ALT 26 09/14/2020    AST 14 09/14/2020    ALKPHOS 58 09/14/2020    BILITOTAL 0.2 09/14/2020    LIPASE 63 (L) 06/10/2020    AMYLASE 46 08/14/2017    PTT 25 04/21/2016    INR 0.95 05/10/2016    TSH 0.89 02/16/2020    T4 1.19 03/28/2017       Preop Vitals  BP Readings from Last 3 Encounters:   09/23/20 (!) 143/69   09/14/20 116/76   09/10/20 112/72    Pulse Readings from Last 3 " "Encounters:   09/23/20 66   09/14/20 54   09/10/20 55      Resp Readings from Last 3 Encounters:   09/23/20 16   09/14/20 16   09/10/20 14    SpO2 Readings from Last 3 Encounters:   09/23/20 98%   09/14/20 98%   06/11/20 95%      Temp Readings from Last 1 Encounters:   09/23/20 36.7  C (98.1  F) (Skin)    Ht Readings from Last 1 Encounters:   09/23/20 1.575 m (5' 2\")      Wt Readings from Last 1 Encounters:   09/23/20 52.6 kg (116 lb)    Estimated body mass index is 21.22 kg/m  as calculated from the following:    Height as of this encounter: 1.575 m (5' 2\").    Weight as of this encounter: 52.6 kg (116 lb).       Anesthesia Plan      History & Physical Review  History and physical reviewed and following examination; no interval change.    ASA Status:  3 .    NPO Status:  > 8 hours    Plan for General (ETT.) with Intravenous induction. Maintenance will be Inhalation.    PONV prophylaxis:  Ondansetron (or other 5HT-3), Scopolamine patch and Dexamethasone or Solumedrol         Postoperative Care  Postoperative pain management:  IV analgesics and Multi-modal analgesia.      Consents  Anesthetic plan, risks, benefits and alternatives discussed with:  Patient..                 Jacoby Khan MD  "

## 2020-09-23 NOTE — CONSULTS
[facility]    Vascular Medicine Consultation     Attestation: I have examined the patient independently of Ailin Nichols PA-C and agree with the examination and plan as delineated below.    Dimitry Alvarez MD      Date of Admission:  9/23/2020  Date of Consult (When I saw the patient): 09/23/20    Assessment & Plan   1.  Peripheral arterial disease with history of aortobifemoral bypass graft and left femoral-popliteal bypass graft now with increased right lower extremity claudication and rest pain with known right SFA occlusion     She does have a known SFA occlusion. More recently her claudication has progressed to increasing rest pain. Post-operative cares will be per Dr. Lozano / Vascular Surgery. She has been on Plavix as an outpatient. She must stop smoking.     2. Symptomatic high grade left carotid artery stenosis s/p left carotid endarterectomy 6/8/20     She has recovered well from this surgery. She does have some new moderate restenosis to 60% on the right post right carotid CEA in the past. This will be followed as an outpatient by Dr. Lozano.      3. Hyperlipidemia     Her lipid panel this admission revealed an LDL of 62, HDL 48, triglycerides 83, and total cholesterol 127 while on Crestor 40 mg daily. She is now at goal with an LDL less than 70 on a maximally efficacious statin. She should continue the same.      4. Hypertension     Continue prior to admission amlodipine, carvedilol, and lisinopril and monitor blood pressure closely.      5. Ongoing tobacco use     She admits to ongoing tobacco use. Her mother just passed away yesterday and she is too stressed to discuss this further right now. She has asked that we discuss this with her after her surgery is completed. Nicotine patches can be provided as needed while in the hospital.         Reason for Consult   Reason for consult: Asked by Dr. Lozano to evaluate vascular risk factors and assist with medical management in this 59  year old female smoker with a history of hypertension, hyperlipidemia, CAD, carotid stenosis s/p bilateral CEA, and known PAD now presenting for a right aortobifemoral graft limb to above knee popliteal bypass using cadaveric SFA for rest pain and lifestyle limiting claudication.    Primary Care Physician   Vasquez Benoit      History of Present Illness   Shirley Hendricks is a 59 year old female smoker normally followed by Dr. Narvaez who has a longstanding relationship with Dr. Chadwick Lozano of Vascular Surgery due to her prominent peripheral arterial disease and carotid disease.  She has previously had problems primarily with iliac occlusive disease and underwent angioplasty and stenting of her right common iliac artery in 2000.  Her right external iliac artery occluded in 03/2004, and she underwent recanalization stenting at that time. In 2010 she developed worsening claudication symptoms in her left leg and due to the very small size of her diseased superficial femoral artery, the patient was taken to the operating room for a left common femoral artery to above-knee popliteal bypass with right greater saphenous vein as conduit.        Unfortunately she continued to smoke. In 6/2016 she underwent a right carotid endarterectomy for symptomatic stenosis followed more recently by a left carotid endarterectomy on 6/8/20 for symptomatic left carotid artery stenosis. She has now recovered from this.     She has continued to have worsening right lower extremity claudication and has now more recently started developing rest pain. It was recommended that she undergo a right aortobifemoral graft limb to above knee popliteal bypass using cadaveric SFA. She presented for this today.         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 Growth  dermatology     Embolism and thrombosis of unspecified artery (H) 8/00    Protein C,S, Leiden FV, Lupus Inhibitor Negative     Gastroesophageal reflux disease      Hyperlipidaemia      Hypertension      Lupus (H)     skin     Mild major depression (H) 11/7/2017     Myocardial infarction (H)     x3     Osteoarthrosis, unspecified whether generalized or localized, unspecified site      PAD (peripheral artery disease) (H)      Peripheral vascular disease, unspecified (H) 12/00    s/p angioplasty with stent right femoral a.; Right iliac and femoral a. clot     Post-menopausal      Reflux esophagitis 2/04    EGD: esophagitis and medium HH     Uncomplicated asthma      Vitamin C deficiency 8/12/2018       Past Surgical History   Past Surgical History:   Procedure Laterality Date     ANGIOGRAM       C FABRIC WRAPPING OF ABDOMINAL ANEURYSM       C NONSPECIFIC PROCEDURE  12/00    angioplasty with stent right fem. a.     C NONSPECIFIC PROCEDURE  1987    sinus surgery     C NONSPECIFIC PROCEDURE  9/2/2009    Emergent left groin exploration with oversewing of bleeding angiographic site. 2. Endarterectomy of common femoral-proximal superficial femoral artery with greater saphenous vein patch angioplasty.   a. [place] of accessory right greater saphenous vein.      C NONSPECIFIC PROCEDURE  8/27/2009    occluded left common iliac and external iliac arteries were successfully revascularized with stenting to 8 and 7 mm      CARDIAC CATHERIZATION  9/3/2009    multivessel CAD without target lesions, med mgmt indicated, preserved ef     ENDARTERECTOMY CAROTID Right 5/11/2016    Procedure: ENDARTERECTOMY CAROTID;  Surgeon: Chadwick Lozano MD;  Location:  OR     ENDARTERECTOMY CAROTID Left 6/8/2020    Procedure: LEFT CAROTID ENDARTERECTOMY with distal facal vein patch  and EEG;  Surgeon: Chadwick Lozano MD;  Location:  OR     GYN SURGERY  left tube    left salpingectomy     LAPAROSCOPIC CHOLECYSTECTOMY N/A 9/27/2017     Procedure: LAPAROSCOPIC CHOLECYSTECTOMY;  LAPAROSCOPIC CHOLECYSTECTOMY;  Surgeon: Jacoby Tapia MD;  Location: [facility]     ORTHOPEDIC SURGERY      left knee surgery     VASCULAR SURGERY  aoto bi fem  left fem-AK pop       Prior to Admission Medications   Prior to Admission Medications   Prescriptions Last Dose Informant Patient Reported? Taking?   BREO ELLIPTA 200-25 MCG/INH Inhaler 9/22/2020 at 0600 Self No Yes   Sig: Inhale 1 puff into the lungs daily   Patient taking differently: Inhale 1 puff into the lungs daily    CALCIUM PO 9/21/2020 at PM Self Yes Yes   Sig: Take 1 tablet by mouth every evening   acetaminophen (TYLENOL) 500 MG tablet 9/23/2020 at 0430 Self Yes Yes   Sig: Take 500-1,000 mg by mouth every 6 hours as needed for mild pain   amLODIPine (NORVASC) 5 MG tablet 9/23/2020 at 0600 Self No Yes   Sig: Take 1 tablet (5 mg) by mouth 2 times daily   carvedilol 6.25 MG PO tablet 9/23/2020 at 0600 Self No Yes   Sig: Take 1 tablet (6.25 mg) by mouth 2 times daily (with meals)   clopidogrel (PLAVIX) 75 MG tablet 9/16/2020 Self No Yes   Sig: Take 1 tablet (75 mg) by mouth daily   denosumab (PROLIA) 60 MG/ML SOLN injection more than 6 months Self No Yes   Sig: Inject 1 mL (60 mg) Subcutaneous every 6 months INDICATION: TO TREAT OSTEOPOROSIS   lisinopril (ZESTRIL) 20 MG tablet 9/23/2020 at 0600 Self No Yes   Sig: Take 1 tablet (20 mg) by mouth 2 times daily   nitroGLYcerin (NITROSTAT) 0.4 MG sublingual tablet Never used at PRN Self No Yes   Sig: Place 1 tablet (0.4 mg) under the tongue See Admin Instructions for chest pain   omeprazole (PRILOSEC) 20 MG DR capsule 9/22/2020 at 0900 Self No Yes   Sig: TAKE ONE CAPSULE BY MOUTH DAILY   Patient taking differently: Take 20 mg by mouth daily TAKE ONE CAPSULE BY MOUTH DAILY   rosuvastatin (CRESTOR) 40 MG tablet 9/21/2020 at 2200 Self No No   Sig: Take 1 tablet (40 mg) by mouth At Bedtime      Facility-Administered Medications: None     Allergies   Allergies    Allergen Reactions     Contrast Dye Anaphylaxis     RASH, FACIAL AND NECK SWELLING, SOB, WHEEZING     Pantoprazole      Protonix caused diffuse edema     Chantix [Varenicline]      Terrible dreams     Penicillins Itching       Social History   Shirley Hendricks  reports that she has been smoking cigarettes. She started smoking about 62 years ago. She has a 10.00 pack-year smoking history. She has never used smokeless tobacco. She reports current alcohol use. She reports that she does not use drugs.    Family History   Family History   Problem Relation Age of Onset     Cancer Mother         bladder     Cardiovascular Father         alive,multiple heart attacks     Diabetes Maternal Grandmother      Lung Cancer Maternal Grandmother      Blood Disease Brother         clotting disorder       Review of Systems   The 10 point Review of Systems is negative other than noted in the HPI or here.     Physical Exam   Temp: 98.1  F (36.7  C) Temp src: Skin BP: (!) 143/69 Pulse: 66   Resp: 16 SpO2: 98 % O2 Device: None (Room air)    Vital Signs with Ranges  Temp:  [98.1  F (36.7  C)] 98.1  F (36.7  C)  Pulse:  [66] 66  Resp:  [16] 16  BP: (143)/(69) 143/69  SpO2:  [98 %] 98 %  116 lbs 0 oz    Constitutional: awake, alert, cooperative, no apparent distress, and appears stated age  Eyes: Lids and lashes normal, pupils equal, round and reactive to light, extra ocular muscles intact, sclera clear, conjunctiva normal  ENT: normocepalic, without obvious abnormality, oropharynx pink and moist  Hematologic / Lymphatic: no lymphadenopathy  Respiratory: No increased work of breathing, good air exchange, clear to auscultation bilaterally, no crackles or wheezing  Cardiovascular: regular rate and rhythm, normal S1 and S2 and no murmur noted  GI: Normal bowel sounds, soft, non-distended, non-tender  Skin: no redness, warmth, or swelling, no rashes and no lesions  Musculoskeletal: There is no redness, warmth, or swelling of the joints.   Full range of motion noted.  Motor strength is 5 out of 5 all extremities bilaterally.  Tone is normal.  Neurologic: Awake, alert, oriented to name, place and time.  Cranial nerves II-XII are grossly intact.  Motor is 5 out of 5 bilaterally.    Neuropsychiatric:  Normal affect, memory, insight.  Pulses: pedal pulses audible by doppler. No carotid bruits appreciated.     Data   Most Recent 3 CBC's:  Recent Labs   Lab Test 09/14/20  1146 06/11/20  0810 06/10/20  1243   WBC 4.8 5.6 8.3   HGB 12.3 12.4 14.9   MCV 95 94 93    171 198     Most Recent 3 BMP's:  Recent Labs   Lab Test 09/23/20  0844 09/14/20  1146 06/11/20  0810 06/10/20  1243   NA  --  132* 136 133   POTASSIUM 4.1 3.9 3.8 4.2   CHLORIDE  --  102 105 98   CO2  --  26 20 24   BUN  --  12 10 12   CR  --  0.57 0.45* 0.52   ANIONGAP  --  4 11 11   SONIA  --  8.5 8.3* 9.3   GLC  --  91 79 72     Most Recent 3 INR's:  Recent Labs   Lab Test 05/10/16  1208 04/21/16  1400   INR 0.95 1.02     Most Recent Cholesterol Panel:  Recent Labs   Lab Test 09/23/20  0844   CHOL 127   LDL 62   HDL 48*   TRIG 83     Most Recent Hemoglobin A1c:  Recent Labs   Lab Test 06/08/20  1302   A1C 5.5

## 2020-09-23 NOTE — ANESTHESIA POSTPROCEDURE EVALUATION
Patient: Shirley Hendricks    Procedure(s):  RIGHT FEMORAL GRAFT TO ABOVE-KNEE POPLITEAL BYPASS USING CADAVERIC SUPERFICIAL FEMORAL ARTERY    Diagnosis:PAD (peripheral artery disease) (H) [I73.9]  Diagnosis Additional Information: No value filed.    Anesthesia Type:  General    Note:  Anesthesia Post Evaluation    Patient location during evaluation: Bedside  Patient participation: Able to fully participate in evaluation  Level of consciousness: awake and alert  Pain management: adequate  Airway patency: patent  Cardiovascular status: acceptable  Respiratory status: acceptable  Hydration status: acceptable  PONV: none             Last vitals:  Vitals:    09/23/20 1539 09/23/20 1609 09/23/20 1611   BP: 116/59 107/55    Pulse: 55 66    Resp: 12 12 12   Temp:      SpO2: 94% 96%          Electronically Signed By: New Noyola MD  September 23, 2020  5:24 PM

## 2020-09-23 NOTE — BRIEF OP NOTE
[facility]    Brief Operative Note    Pre-operative diagnosis: PAD (peripheral artery disease) (H) [I73.9]  Post-operative diagnosis Same as pre-operative diagnosis    Procedure: Procedure(s):  RIGHT FEMORAL GRAFT TO ABOVE-KNEE POPLITEAL BYPASS USING CADAVERIC SUPERFICIAL FEMORAL ARTERY  Surgeon: Surgeon(s) and Role:     * Chadwick Lozano MD - Primary     * Keegan Diez MD - Fellow - Assisting  Anesthesia: General   Estimated blood loss: Less than 100 ml  Drains: None  Specimens: * No specimens in log *  Findings:   We performed a bypass from the right limb of the AFB to the above knee popliteal artery with SFA cadaver.  There was a good pulse in the graft at the end of the case.  Good palpable DP as well.  Good hemostasis.  .  Complications: None.  Implants:   Implant Name Type Inv. Item Serial No.  Lot No. LRB No. Used Action   [employer] FEMORAL POPLITEAL ARTERY 30-99CM   [id number] [employer] [id number] Right 1 Implanted     Luke Pesonen  Vascular Surgery Fellow

## 2020-09-23 NOTE — ANESTHESIA CARE TRANSFER NOTE
Patient: Shirley Hendricks    Procedure(s):  RIGHT FEMORAL GRAFT TO ABOVE-KNEE POPLITEAL BYPASS USING CADAVERIC SUPERFICIAL FEMORAL ARTERY    Diagnosis: PAD (peripheral artery disease) (H) [I73.9]  Diagnosis Additional Information: No value filed.    Anesthesia Type:   General     Note:  Airway :Face Mask  Patient transferred to:PACU  Comments: To PACU with face mask, 8l/min O2, spontaneous ventilations. VSS. Report to RNHandoff Report: Identifed the Patient, Identified the Reponsible Provider, Reviewed the pertinent medical history, Discussed the surgical course, Reviewed Intra-OP anesthesia mangement and issues during anesthesia, Set expectations for post-procedure period and Allowed opportunity for questions and acknowledgement of understanding      Vitals: (Last set prior to Anesthesia Care Transfer)    CRNA VITALS  9/23/2020 1251 - 9/23/2020 1328      9/23/2020             Pulse:  92    SpO2:  99 %    Resp Rate (observed):  10    Resp Rate (set):  10                Electronically Signed By: NOELLE Briones CRNA  September 23, 2020  1:28 PM

## 2020-09-23 NOTE — OP NOTE
Procedure Date: 09/23/2020      PREOPERATIVE DIAGNOSIS:  Disabling right leg claudication secondary to occlusion right superficial femoral artery.      POSTOPERATIVE DIAGNOSIS:  Disabling right leg claudication secondary to occlusion right superficial femoral artery.      PROCEDURES:  Right femoral graft limb to above-knee popliteal cadaveric superficial femoral artery bypass graft.      SURGEON:  Chadwick Lozano MD      ASSISTANT:  Keegan Diez MD (Vascular fellow)      ANESTHESIA:  General.      PREOPERATIVE MEDICATIONS:  Cleocin 900 mg IV.      INDICATIONS:  This is a 59-year-old patient who has diffuse peripheral artery disease.  She has undergone a previous aortobifemoral bypass graft with a left femoral to above-knee popliteal NRTSV bypass using her right greater saphenous vein many years ago that has continued to function well.  She has also undergone carotid endarterectomies.  She has had progressively increasing claudication symptoms, right leg.  Superficial femoral artery is now occluded by CTA with a small but patent and disease free above-knee popliteal artery with 3-vessel runoff.  We felt that she would now benefit from a bypass graft due to increasing short distance claudication and occasional rest pain, but fortunately no ulcers.  She has no autogenous veins and with her small arteries, we felt the cadaveric superficial femoral artery graft would be the best conduit.  She comes to the operating room today under informed consent.      DESCRIPTION OF PROCEDURE:  The patient was brought to the operating room, induced under general anesthesia, orally intubated without difficulty.  Alvarez catheter was placed.  Calf pneumatic compression boot was placed on the left with appropriate padding of her left leg.  Entire right leg and groin were prepped and draped.  The patient is quite thin with easily palpable femoral graft pulse.  Timeout was called and the sites were identified.      VASCULAR EXPOSURE:  A  small curvilinear incision was made in the right groin.  Dissection was carried down to identify the distal portion of the Dacron femoral limb of her aortofemoral graft.  This was dissected free, as was the native more proximal common femoral artery that had a small amount of flow by CTA.  Superficial femoral artery was essentially occluded and this was dissected free.  A smaller profundus femoral artery also was dissected free and encircled with Silastic vessel loops.      A above-knee medial incision was made.  Dissection was carried down through the fascia to identify the popliteal artery just beyond Tyrese's canal.  This was a smaller artery measuring approximately 3 mm in diameter, but completely free of disease.  Proximal and distal Silastic vessel loops were placed.  A small clip was placed and later removed on a small side branch.      FEMORAL TO ABOVE-KNEE POPLITEAL BYPASS:  We selected a 36 cm  cadaveric superficial femoral artery graft.  This was prepped in the standard fashion over a time 40 minutes with appropriate flushing 5000 units intravenous were given.  A vascular clamp was applied to the graft and proximal common femoral artery and the vessel loops to the SFA and profundus femoral artery.  An 11 blade was used to open the hanson of the Dacron graft and a 4 mm punch was used to make an appropriate size smooth opening.  There was a small amount of organized wall thrombus noted in the common femoral artery that was easily removed.  No backbleeding in the superficial femoral artery as expected.  Good backbleeding in the profundus femoral artery, but this measured less than 3 mm in diameter and a small amount of plaque at its origin, but I did not feel removing this would be beneficial.  Leaving the cadaveric SFA bypass in its normal direction of flow this was trimmed obliquely and an end-to-side anastomosis to the hanson of the graft was created with running 6-0 Prolene suture and loupe magnification.   With release of the vessel loops, we had a strong pulse and excellent hemostasis.  Metal Ligaclips were used to sajan the anastomosis.  We allowed the graft to perfuse for approximately 10 minutes to allow the SFA graft to lengthen to its native appropriate length and we had well over 42 cm of the graft.  We then used a [place] tunneler to create a subfascial tunnel from the knee to the groin and our graft was brought through this in a very gentle curve, controlled with a vascular clamp distally.  There was no bleeding from the sutured side branches on the graft.  Vessel loops were tightened around the superficial femoral artery and a 1 cm longitudinal arteriotomy was made.  The artery was completely disease free and we had good backbleeding.  This was irrigated with 0.5% Marcaine and gently dilated up to 4 mm.  The vascular  clamps were reapplied inflow graft limb and the  SFA graft was trimmed at the appropriate length with an end-to-side anastomosis created with running 6-0 Prolene suture.  With release of the vascular clamps proximally and distally along the distal vessel loop, we allowed blood flow to return to the right leg.  We had an excellent pulse being noted.  We gently dilated  the native popliteal artery to artery  4 mm with our graft measuring approximately 7 mm.  A small amount of needle hole bleeding stopped with Surgicel and time.  We had a strong palpable graft pulse and a +3 dorsalis pedis pulse, which was marked.      Good hemostasis was noted.  In the knee we closed the fascia with interrupted 3-0 Vicryl.  Subcutaneous tissue was closed with interrupted 3-0 Vicryl and skin with 4-0 Monocryl in subcuticular fashion.  In the groin,  this was closed in layers with interrupted 2-0 and 3-0 Vicryl suture.  The skin was closed with interrupted mattress 4-0 nylon sutures.  Wounds were infiltrated with 0.5% Marcaine for post-analgesia.  Gauze and Medipore tape was applied to the 2 incisions.      The patient  tolerated the procedure well.      ESTIMATED BLOOD LOSS:  Less than 25 mL.      COMPLICATIONS:  None.      The patient awoke upon the operative table, was extubated and returned to the recovery room in satisfactory condition.  She will restart her chronic aspirin and Plavix.        Excellent inflow from ABF graft.  Small but disease free native popliteal artery with 3 vessel runoff.     OSMAN URIOSTEGUI MD             D: 2020   T: 2020   MT: WIL      Name:     KASIA WAN   MRN:      [id number]        Account:        [id number]   :      1960           Procedure Date: 2020      Document: [id number]

## 2020-09-24 ENCOUNTER — APPOINTMENT (OUTPATIENT)
Dept: OCCUPATIONAL THERAPY | Facility: CLINIC | Age: 60
DRG: 254 | End: 2020-09-24
Attending: INTERNAL MEDICINE
Payer: COMMERCIAL

## 2020-09-24 LAB
ANION GAP SERPL CALCULATED.3IONS-SCNC: 3 MMOL/L (ref 3–14)
BUN SERPL-MCNC: 13 MG/DL (ref 7–30)
CALCIUM SERPL-MCNC: 8.3 MG/DL (ref 8.5–10.1)
CHLORIDE SERPL-SCNC: 105 MMOL/L (ref 94–109)
CO2 SERPL-SCNC: 25 MMOL/L (ref 20–32)
CREAT SERPL-MCNC: 0.47 MG/DL (ref 0.52–1.04)
ERYTHROCYTE [DISTWIDTH] IN BLOOD BY AUTOMATED COUNT: 13.3 % (ref 10–15)
GFR SERPL CREATININE-BSD FRML MDRD: >90 ML/MIN/{1.73_M2}
GLUCOSE BLDC GLUCOMTR-MCNC: 87 MG/DL (ref 70–99)
GLUCOSE SERPL-MCNC: 82 MG/DL (ref 70–99)
HCT VFR BLD AUTO: 33.8 % (ref 35–47)
HGB BLD-MCNC: 11.4 G/DL (ref 11.7–15.7)
INR PPP: 1.01 (ref 0.86–1.14)
MCH RBC QN AUTO: 31.6 PG (ref 26.5–33)
MCHC RBC AUTO-ENTMCNC: 33.7 G/DL (ref 31.5–36.5)
MCV RBC AUTO: 94 FL (ref 78–100)
PLATELET # BLD AUTO: 114 10E9/L (ref 150–450)
POTASSIUM SERPL-SCNC: 4.1 MMOL/L (ref 3.4–5.3)
RBC # BLD AUTO: 3.61 10E12/L (ref 3.8–5.2)
SODIUM SERPL-SCNC: 133 MMOL/L (ref 133–144)
WBC # BLD AUTO: 7.7 10E9/L (ref 4–11)

## 2020-09-24 PROCEDURE — 85027 COMPLETE CBC AUTOMATED: CPT | Performed by: STUDENT IN AN ORGANIZED HEALTH CARE EDUCATION/TRAINING PROGRAM

## 2020-09-24 PROCEDURE — 85610 PROTHROMBIN TIME: CPT | Performed by: STUDENT IN AN ORGANIZED HEALTH CARE EDUCATION/TRAINING PROGRAM

## 2020-09-24 PROCEDURE — 12000000 ZZH R&B MED SURG/OB

## 2020-09-24 PROCEDURE — 97165 OT EVAL LOW COMPLEX 30 MIN: CPT | Mod: GO

## 2020-09-24 PROCEDURE — 25000132 ZZH RX MED GY IP 250 OP 250 PS 637: Performed by: INTERNAL MEDICINE

## 2020-09-24 PROCEDURE — 25000132 ZZH RX MED GY IP 250 OP 250 PS 637: Performed by: STUDENT IN AN ORGANIZED HEALTH CARE EDUCATION/TRAINING PROGRAM

## 2020-09-24 PROCEDURE — 25800030 ZZH RX IP 258 OP 636: Performed by: STUDENT IN AN ORGANIZED HEALTH CARE EDUCATION/TRAINING PROGRAM

## 2020-09-24 PROCEDURE — 99233 SBSQ HOSP IP/OBS HIGH 50: CPT | Performed by: INTERNAL MEDICINE

## 2020-09-24 PROCEDURE — 00000146 ZZHCL STATISTIC GLUCOSE BY METER IP

## 2020-09-24 PROCEDURE — 25000132 ZZH RX MED GY IP 250 OP 250 PS 637: Performed by: PHYSICIAN ASSISTANT

## 2020-09-24 PROCEDURE — 36415 COLL VENOUS BLD VENIPUNCTURE: CPT | Performed by: STUDENT IN AN ORGANIZED HEALTH CARE EDUCATION/TRAINING PROGRAM

## 2020-09-24 PROCEDURE — 80048 BASIC METABOLIC PNL TOTAL CA: CPT | Performed by: STUDENT IN AN ORGANIZED HEALTH CARE EDUCATION/TRAINING PROGRAM

## 2020-09-24 PROCEDURE — 97535 SELF CARE MNGMENT TRAINING: CPT | Mod: GO

## 2020-09-24 PROCEDURE — 25000128 H RX IP 250 OP 636: Performed by: STUDENT IN AN ORGANIZED HEALTH CARE EDUCATION/TRAINING PROGRAM

## 2020-09-24 RX ORDER — LISINOPRIL 20 MG/1
20 TABLET ORAL 2 TIMES DAILY
Status: DISCONTINUED | OUTPATIENT
Start: 2020-09-24 | End: 2020-09-25 | Stop reason: HOSPADM

## 2020-09-24 RX ADMIN — CLOPIDOGREL BISULFATE 75 MG: 75 TABLET, FILM COATED ORAL at 08:15

## 2020-09-24 RX ADMIN — LISINOPRIL 20 MG: 20 TABLET ORAL at 21:33

## 2020-09-24 RX ADMIN — OMEPRAZOLE 20 MG: 20 CAPSULE, DELAYED RELEASE ORAL at 08:15

## 2020-09-24 RX ADMIN — AMLODIPINE BESYLATE 5 MG: 5 TABLET ORAL at 21:33

## 2020-09-24 RX ADMIN — HYDROMORPHONE HYDROCHLORIDE 0.3 MG: 1 INJECTION, SOLUTION INTRAMUSCULAR; INTRAVENOUS; SUBCUTANEOUS at 19:39

## 2020-09-24 RX ADMIN — ROSUVASTATIN CALCIUM 40 MG: 20 TABLET, FILM COATED ORAL at 21:33

## 2020-09-24 RX ADMIN — ACETAMINOPHEN 975 MG: 325 TABLET, FILM COATED ORAL at 01:49

## 2020-09-24 RX ADMIN — ACETAMINOPHEN 975 MG: 325 TABLET, FILM COATED ORAL at 15:56

## 2020-09-24 RX ADMIN — HYDROMORPHONE HYDROCHLORIDE 0.3 MG: 1 INJECTION, SOLUTION INTRAMUSCULAR; INTRAVENOUS; SUBCUTANEOUS at 08:14

## 2020-09-24 RX ADMIN — OXYCODONE HYDROCHLORIDE 10 MG: 5 TABLET ORAL at 21:33

## 2020-09-24 RX ADMIN — CEFAZOLIN 1 G: 1 INJECTION, POWDER, FOR SOLUTION INTRAMUSCULAR; INTRAVENOUS at 01:50

## 2020-09-24 RX ADMIN — SODIUM CHLORIDE: 9 INJECTION, SOLUTION INTRAVENOUS at 06:14

## 2020-09-24 RX ADMIN — CARVEDILOL 6.25 MG: 6.25 TABLET, FILM COATED ORAL at 18:16

## 2020-09-24 RX ADMIN — ACETAMINOPHEN 975 MG: 325 TABLET, FILM COATED ORAL at 08:14

## 2020-09-24 RX ADMIN — HYDROMORPHONE HYDROCHLORIDE 0.3 MG: 1 INJECTION, SOLUTION INTRAMUSCULAR; INTRAVENOUS; SUBCUTANEOUS at 01:50

## 2020-09-24 RX ADMIN — NICOTINE 1 PATCH: 21 PATCH, EXTENDED RELEASE TRANSDERMAL at 18:17

## 2020-09-24 RX ADMIN — OXYCODONE HYDROCHLORIDE 5 MG: 5 TABLET ORAL at 14:29

## 2020-09-24 RX ADMIN — SODIUM CHLORIDE: 9 INJECTION, SOLUTION INTRAVENOUS at 19:40

## 2020-09-24 RX ADMIN — ENOXAPARIN SODIUM 40 MG: 40 INJECTION SUBCUTANEOUS at 08:15

## 2020-09-24 RX ADMIN — HYDROMORPHONE HYDROCHLORIDE 0.3 MG: 1 INJECTION, SOLUTION INTRAMUSCULAR; INTRAVENOUS; SUBCUTANEOUS at 11:27

## 2020-09-24 ASSESSMENT — ACTIVITIES OF DAILY LIVING (ADL)
ADLS_ACUITY_SCORE: 15
PREVIOUS_RESPONSIBILITIES: MEAL PREP;HOUSEKEEPING;LAUNDRY;SHOPPING;MEDICATION MANAGEMENT;FINANCES;DRIVING;WORK
ADLS_ACUITY_SCORE: 15

## 2020-09-24 ASSESSMENT — MIFFLIN-ST. JEOR: SCORE: 1069.25

## 2020-09-24 NOTE — PLAN OF CARE
PT: Orders received, chart reviewed, attempted session. Pt reports she ambulated with nursing earlier today and is unable to get OOB at this time due to effects of dilaudid she was recently given. Pt in agreement to participate in PT evaluation and treatment in AM of 9/25/2020. Pt encouraged to ambulate again with nursing staff later today. Pt in agreement.

## 2020-09-24 NOTE — PROGRESS NOTES
09/24/20 1449   Quick Adds   Type of Visit Initial Occupational Therapy Evaluation   Living Environment   Lives With spouse;child(ifeanyi), adult  (two sons, daughter in law and spouse )   Living Arrangements house   Home Accessibility stairs to enter home   Number of Stairs, Main Entrance 3   Stair Railings, Main Entrance railing on right side (ascending)   Transportation Anticipated family or friend will provide   Self-Care   Usual Activity Tolerance good   Current Activity Tolerance moderate   Regular Exercise No   Equipment Currently Used at Home grab bar, tub/shower  (has walking stick )   Functional Level   Ambulation 0-->independent   Transferring 0-->independent   Toileting 0-->independent   Bathing 0-->independent   Dressing 0-->independent   Eating 0-->independent   Communication 0-->understands/communicates without difficulty   Swallowing 0-->swallows foods/liquids without difficulty   Cognition 0 - no cognition issues reported   Fall history within last six months yes   Number of times patient has fallen within last six months 1   Which of the above functional risks had a recent onset or change? ambulation;transferring;bathing;dressing   General Information   Onset of Illness/Injury or Date of Surgery - Date 09/23/20   Referring Physician Dimitry Alvarez MD    Patient/Family Goals Statement go home   Additional Occupational Profile Info/Pertinent History of Current Problem 58 yo female who is POD#1 from R CFA to above knee popliteal bypass with cadaveric graft for lifestyle limiting claudication   Precautions/Limitations fall precautions   General Observations Activity: Up with Assist    Cognitive Status Examination   Orientation orientation to person, place and time   Visual Perception   Visual Perception No deficits were identified   Sensory Examination   Sensory Comments C/O numbness in R foot    Pain Assessment   Patient Currently in Pain No   Integumentary/Edema   Integumentary/Edema  Comments generalized swelling in R LE   Posture   Posture not impaired   Range of Motion (ROM)   ROM Quick Adds No deficits were identified   Strength   Manual Muscle Testing Quick Adds No deficits were identified   Muscle Tone Assessment   Muscle Tone Quick Adds No deficits were identified   Coordination   Upper Extremity Coordination No deficits were identified   Mobility   Bed Mobility Bed mobility skill: Sit to supine;Bed mobility skill: Supine to sit   Bed Mobility Skill: Sit to Supine   Level of [place]: Sit/Supine stand-by assist   Physical Assist/Nonphysical Assist: Sit/Supine supervision;verbal cues   Bed Mobility Skill: Supine to Sit   Level of [place]: Supine/Sit stand-by assist   Physical Assist/Nonphysical Assist: Supine/Sit set-up required;supervision;verbal cues   Transfer Skill: Sit to Stand   Level of [place]: Sit/Stand stand-by assist   Physical Assist/Nonphysical Assist: Sit/Stand set-up required;supervision;verbal cues;1 person assist   Transfer Skill: Sit to Stand full weight-bearing   Assistive Device for Transfer: Sit/Stand standard walker   Transfer Skill: Toilet Transfer   Level of [place]: Toilet stand-by assist   Physical Assist/Nonphysical Assist: Toilet set-up required;supervision;verbal cues   Weight-Bearing Restrictions: Toilet full weight-bearing   Assistive Device standard walker   Lower Body Dressing   Level of [place]: Dress Lower Body moderate assist (50% patients effort)   Toileting   Level of [place]: Toilet independent   Grooming   Level of [place]: Grooming independent   Instrumental Activities of Daily Living (IADL)   Previous Responsibilities meal prep;housekeeping;laundry;shopping;medication management;finances;driving;work   Activities of Daily Living Analysis   Impairments Contributing to Impaired Activities of Daily Living balance impaired;flexibility decreased;pain   General Therapy Interventions   Planned Therapy Interventions ADL  "retraining;IADL retraining;transfer training;home program guidelines;progressive activity/exercise   Clinical Impression   Criteria for Skilled Therapeutic Interventions Met yes, treatment indicated   OT Diagnosis decreased independence in ADLs/IADLS    Influenced by the following impairments decreased flexibility, pain, decreased balance   Assessment of Occupational Performance 1-3 Performance Deficits   Identified Performance Deficits dressing, bathing, toileting    Clinical Decision Making (Complexity) Low complexity   Therapy Frequency Daily   Predicted Duration of Therapy Intervention (days/wks) 3 days   Anticipated Discharge Disposition Home with Assist   Risks and Benefits of Treatment have been explained. Yes   Patient, Family & other staff in agreement with plan of care Yes   [facility] TM \"6 Clicks\"   2016, Trustees of [facility], under license to [employer].  All rights reserved.   6 Clicks Short Forms Daily Activity Inpatient Short Form   [facility]  \"6 Clicks\" Daily Activity Inpatient Short Form   1. Putting on and taking off regular lower body clothing? 2 - A Lot   2. Bathing (including washing, rinsing, drying)? 2 - A Lot   3. Toileting, which includes using toilet, bedpan or urinal? 3 - A Little   4. Putting on and taking off regular upper body clothing? 4 - None   5. Taking care of personal grooming such as brushing teeth? 4 - None   6. Eating meals? 4 - None   Daily Activity Raw Score (Score out of 24.Lower scores equate to lower levels of function) 19   Total Evaluation Time   Total Evaluation Time (Minutes) 10     "

## 2020-09-24 NOTE — PLAN OF CARE
Arrived from PACU at 1510. A&O x4. VSS on room air. CMS w/baseline numbness and tingling and lowered sensation of right foot. Pulses palpable. Right foot slightly joey. Lungs clear. BS+, BM-, flatus-. Right groin and knee incisions covered, CDI. Tolerating regular diet. Denies N/V. Due to void. Oxycodone for pain. Due to dangle.

## 2020-09-24 NOTE — PLAN OF CARE
VSS, on 2L O2 at night for comfort/mouth breathing otherwise on RA, pain in RLE, graft site CDI w/palpable pulse above site, groin site is CDI, numbness and tingling present and baseline per patient, sensation absent below L ankle and is baseline per patient, PRN IV dilaudid given x2 for pain, hypoactive bowel sounds, voiding, unsure if passing gas, patient is tearful d/t her mother passing away on 9/22,  denies nausea, tolerating a regular diet, LS clear, vascular following, continue to monitor

## 2020-09-24 NOTE — PLAN OF CARE
Initial OT evaluation completed and treatment initiated. Patient 58 yo female who is POD#1 from R CFA to above knee popliteal bypass with cadaveric graft for lifestyle limiting claudication. Patient lives in house - [address] [email or web address] with railing, with spouse two adult sons and daughter in law. Patient reports independence in ADLs/IADLs, works as a .     Discharge Planner OT   Patient plan for discharge: home    Current status: Patient transferred supine-sit EOB SBA with HOB elevated. sit-stand SBA with FWW. Patient ambulated in room into hallway ~ 40 feet with FWW. Patient navigated up/down 3 steps with Siddharth and increased time/effort. Patient returned to room SBA with fWW and transferred to [facility] [place] toilet SBA. toileting and clothing management independent. sit-stand modified independent. Patient stood at sink and completed G/H task independently. Patient ambulated back to room and transferred to EOB SBA with FWW. Therapist educated pt on LB dressing techniques including use of reacher, patient verbalized understanding though declined trial of AE with dressing. Therapist educated patient on AE for home such as RTS with pt verbalizing understanding and where to purchase. sit-supine SBA with HOB elevated    Barriers to return to prior living situation: decreased flexibility, decreased balance, pain    Recommendations for discharge: home with assist from family prn    Rationale for recommendations: Patient has supportive family, progressing well in therapy.        Entered by: Rima Bernal 09/24/2020 3:05 PM

## 2020-09-24 NOTE — PROGRESS NOTES
"Vascular Surgery Progress Note:     S: doing ok this morning. The pain in her right leg is controlled. She is tolerating a regular diet. She had a decent nights sleep.     O:   /67 (BP Location: Right arm)   Pulse 68   Temp 97.8  F (36.6  C) (Oral)   Resp 16   Ht 1.575 m (5' 2\")   Wt 55.1 kg (121 lb 7.6 oz)   SpO2 95%   BMI 22.22 kg/m    General: appears well, not in acute distress   CV: regular rate and rhythm   Resp: normal respiratory effort   Abd: soft and not distended   Right leg: dressings c/d/i with minimal strike-through, palpable pulse along graft in thigh, palpable DP pulse, leg warm and well perfused     A/P:   Ms Hendricks is a 58 yo female who is POD#1 from R CFA to above knee popliteal bypass with cadaveric graft for lifestyle limiting claudication. Doing well.     -continue regular diet   -tylenol, oxycodone, and prn iv dilaudid for pain   -continue plavix   -lovenox for VTE prophylaxis   -continue pta statin, amlodipine, and coreg   -up with assist   -incentive spirometer     Randal Barron MD  General Surgery Resident, PGY-4  P: [phone]     STAFF: As above.  Overall very comfortable.  Restarted her chronic Plavix and other medications.   +3 palpable graft/DP/PT pulses.  Will gradually increase activity.  Plan discharge in 1 to 2 days.       Chadwick Lozano MD     "

## 2020-09-24 NOTE — PROGRESS NOTES
[facility]    Vascular Medicine Progress Note    Attestation: I have examined the patient independently of Ailin Nichols PA-C and agree with the examination and plan as delineated below.    Dimitry Alvarez MD      Date of Service (when I saw the patient): 09/24/2020     Assessment & Plan   1.  Peripheral arterial disease with history of aortobifemoral bypass graft and left femoral-popliteal bypass graft now with increased right lower extremity claudication and rest pain with known right SFA occlusion s/p right femoral graft limb to above-knee popliteal cadaveric superficial femoral artery bypass graft 9/23/20    -Palpable pedal and graft pulses  -Post-operative cares will be per Dr. Lozano / Vascular Surgery.   -Ambulate. Start PT / OT.  -Plavix has been resumed.  -She must stop smoking.      2. Symptomatic high grade left carotid artery stenosis s/p left carotid endarterectomy 6/8/20     -She has recovered well from this surgery.   -She does have some new moderate restenosis to 60% on the right post right carotid CEA in the past. This will be followed as an outpatient by Dr. Lozano.      3. Hyperlipidemia     -LDL 62 -> at goal  -Continue Crestor 40 mg daily.      4. Hypertension     -BP low this am  -Continue prior to admission amlodipine, carvedilol, and lisinopril (with hold parameters)  -Monitor blood pressure closely.      5. Ongoing tobacco use     -She admits to ongoing tobacco use.   -Her mother just passed away yesterday and she is too stressed to discuss this further right now. She has asked that we discuss this with her later.   -Nicotine 21 mg patch daily.     Interval History   Doing well. Pain controlled. Sleepy. Feet warm and palpable DP pulses bilaterally.    Physical Exam   Temp: 98.2  F (36.8  C) Temp src: Oral BP: 97/54 Pulse: 54   Resp: 16 SpO2: 100 % O2 Device: Oxymask Oxygen Delivery: 2 LPM  Vitals:    09/23/20 0835 09/24/20 0700   Weight: 52.6 kg (116 lb) 55.1 kg (121  lb 7.6 oz)     Vital Signs with Ranges  Temp:  [97.1  F (36.2  C)-98.2  F (36.8  C)] 98.2  F (36.8  C)  Pulse:  [54-79] 54  Resp:  [11-21] 16  BP: ()/(54-80) 97/54  SpO2:  [94 %-100 %] 100 %  I/O last 3 completed shifts:  In: 1740 [P.O.:240; I.V.:1500]  Out: 525 [Urine:500; Blood:25]    Constitutional: Awake, alert, cooperative, no apparent distress, and appears stated age.  Eyes: Lids and lashes normal, sclera clear, conjunctiva normal.  ENT: Normocephalic, without obvious abnormality, atraumatic, oral pharynx with moist mucus membranes  Respiratory: No increased work of breathing, good air exchange, clear to auscultation bilaterally, no crackles or wheezing.  Cardiovascular: Regular rate and rhythm, normal S1 and S2, no S3 or S4, and no murmur noted.  GI: Normal bowel sounds, soft, non-distended, non-tender  Skin: No bruising or bleeding, normal skin color, texture, turgor, no redness, warmth, or swelling, no rashes, surgical sites c/d/i.   Musculoskeletal: There is no redness, warmth, or swelling of the joints.    Neurologic: Awake, alert, oriented to name, place and time.  Cranial nerves II-XII are grossly intact.    Neuropsychiatric: Calm, normal eye contact, alert, normal affect, oriented to self, place, time and situation, memory for past and recent events intact and thought process normal.  Pulses: Palpable right DP/PT and graft pulse. Palpable left DP pulse.     Medications     nitroPRUsside (NIPRIDE) IV infusion ADULT/PEDS GREATER than or EQUAL to 45 kg (std conc)       BETA BLOCKER NOT PRESCRIBED       sodium chloride 75 mL/hr at 09/24/20 0614       acetaminophen  975 mg Oral Q8H     amLODIPine  5 mg Oral BID     carvedilol  6.25 mg Oral BID w/meals     clopidogrel  75 mg Oral Daily     enoxaparin ANTICOAGULANT  40 mg Subcutaneous Q24H     fluticasone-vilanterol  1 puff Inhalation Daily     nicotine  1 patch Transdermal Daily with supper     nicotine   Transdermal Q8H     nitroGLYcerin  0.4 mg  Sublingual See Admin Instructions     omeprazole  20 mg Oral Daily     rosuvastatin  40 mg Oral At Bedtime     sodium chloride (PF)  3 mL Intracatheter Q8H       Data   Recent Labs   Lab 09/24/20  0739 09/23/20 2059 09/23/20  0844   WBC 7.7  --   --    HGB 11.4*  --   --    MCV 94  --   --    * 126*  --    INR 1.01  --   --      --   --    POTASSIUM 4.1  --  4.1   CHLORIDE 105  --   --    CO2 25  --   --    BUN 13  --   --    CR 0.47*  --  0.47*   ANIONGAP 3  --   --    SONIA 8.3*  --   --    GLC 82  --   --      No results found for this or any previous visit (from the past 24 hour(s)).

## 2020-09-24 NOTE — PLAN OF CARE
Pt alert and oriented, up with stand by assist with walker and gait belt, tolerates diet, good pain control with oral pain medications, leg incision clean dry and intact, leg elevated when in bed, pulses palpable, does have numbness in right foot that is baseline, nicotine patch in place,

## 2020-09-25 ENCOUNTER — APPOINTMENT (OUTPATIENT)
Dept: PHYSICAL THERAPY | Facility: CLINIC | Age: 60
DRG: 254 | End: 2020-09-25
Attending: INTERNAL MEDICINE
Payer: COMMERCIAL

## 2020-09-25 VITALS
BODY MASS INDEX: 22.43 KG/M2 | SYSTOLIC BLOOD PRESSURE: 121 MMHG | HEART RATE: 63 BPM | TEMPERATURE: 98.4 F | HEIGHT: 62 IN | OXYGEN SATURATION: 93 % | RESPIRATION RATE: 17 BRPM | WEIGHT: 121.91 LBS | DIASTOLIC BLOOD PRESSURE: 65 MMHG

## 2020-09-25 LAB
ANION GAP SERPL CALCULATED.3IONS-SCNC: 4 MMOL/L (ref 3–14)
BASOPHILS # BLD AUTO: 0 10E9/L (ref 0–0.2)
BASOPHILS NFR BLD AUTO: 0.5 %
BUN SERPL-MCNC: 8 MG/DL (ref 7–30)
CALCIUM SERPL-MCNC: 8.9 MG/DL (ref 8.5–10.1)
CHLORIDE SERPL-SCNC: 107 MMOL/L (ref 94–109)
CO2 SERPL-SCNC: 28 MMOL/L (ref 20–32)
CREAT SERPL-MCNC: 0.53 MG/DL (ref 0.52–1.04)
DIFFERENTIAL METHOD BLD: NORMAL
EOSINOPHIL # BLD AUTO: 0.2 10E9/L (ref 0–0.7)
EOSINOPHIL NFR BLD AUTO: 5.4 %
ERYTHROCYTE [DISTWIDTH] IN BLOOD BY AUTOMATED COUNT: 13.4 % (ref 10–15)
GFR SERPL CREATININE-BSD FRML MDRD: >90 ML/MIN/{1.73_M2}
GLUCOSE BLDC GLUCOMTR-MCNC: 77 MG/DL (ref 70–99)
GLUCOSE SERPL-MCNC: 90 MG/DL (ref 70–99)
HCT VFR BLD AUTO: 38.8 % (ref 35–47)
HGB BLD-MCNC: 12.8 G/DL (ref 11.7–15.7)
IMM GRANULOCYTES # BLD: 0 10E9/L (ref 0–0.4)
IMM GRANULOCYTES NFR BLD: 0 %
LYMPHOCYTES # BLD AUTO: 1 10E9/L (ref 0.8–5.3)
LYMPHOCYTES NFR BLD AUTO: 23.3 %
MCH RBC QN AUTO: 31.3 PG (ref 26.5–33)
MCHC RBC AUTO-ENTMCNC: 33 G/DL (ref 31.5–36.5)
MCV RBC AUTO: 95 FL (ref 78–100)
MONOCYTES # BLD AUTO: 0.3 10E9/L (ref 0–1.3)
MONOCYTES NFR BLD AUTO: 6.4 %
NEUTROPHILS # BLD AUTO: 2.7 10E9/L (ref 1.6–8.3)
NEUTROPHILS NFR BLD AUTO: 64.4 %
NRBC # BLD AUTO: 0 10*3/UL
NRBC BLD AUTO-RTO: 0 /100
PLATELET # BLD AUTO: 158 10E9/L (ref 150–450)
POTASSIUM SERPL-SCNC: 3.6 MMOL/L (ref 3.4–5.3)
RBC # BLD AUTO: 4.09 10E12/L (ref 3.8–5.2)
SODIUM SERPL-SCNC: 139 MMOL/L (ref 133–144)
WBC # BLD AUTO: 4.3 10E9/L (ref 4–11)

## 2020-09-25 PROCEDURE — 80048 BASIC METABOLIC PNL TOTAL CA: CPT | Performed by: INTERNAL MEDICINE

## 2020-09-25 PROCEDURE — 99233 SBSQ HOSP IP/OBS HIGH 50: CPT | Performed by: INTERNAL MEDICINE

## 2020-09-25 PROCEDURE — 25000128 H RX IP 250 OP 636: Performed by: STUDENT IN AN ORGANIZED HEALTH CARE EDUCATION/TRAINING PROGRAM

## 2020-09-25 PROCEDURE — 85025 COMPLETE CBC W/AUTO DIFF WBC: CPT | Performed by: INTERNAL MEDICINE

## 2020-09-25 PROCEDURE — 25000132 ZZH RX MED GY IP 250 OP 250 PS 637: Performed by: PHYSICIAN ASSISTANT

## 2020-09-25 PROCEDURE — 25000132 ZZH RX MED GY IP 250 OP 250 PS 637: Performed by: STUDENT IN AN ORGANIZED HEALTH CARE EDUCATION/TRAINING PROGRAM

## 2020-09-25 PROCEDURE — 40000893 ZZH STATISTIC PT IP EVAL DEFER

## 2020-09-25 PROCEDURE — 00000146 ZZHCL STATISTIC GLUCOSE BY METER IP

## 2020-09-25 PROCEDURE — 36415 COLL VENOUS BLD VENIPUNCTURE: CPT | Performed by: INTERNAL MEDICINE

## 2020-09-25 RX ADMIN — ACETAMINOPHEN 975 MG: 325 TABLET, FILM COATED ORAL at 09:05

## 2020-09-25 RX ADMIN — ACETAMINOPHEN 975 MG: 325 TABLET, FILM COATED ORAL at 00:42

## 2020-09-25 RX ADMIN — CLOPIDOGREL BISULFATE 75 MG: 75 TABLET, FILM COATED ORAL at 09:06

## 2020-09-25 RX ADMIN — OMEPRAZOLE 20 MG: 20 CAPSULE, DELAYED RELEASE ORAL at 09:05

## 2020-09-25 RX ADMIN — OXYCODONE HYDROCHLORIDE 10 MG: 5 TABLET ORAL at 00:42

## 2020-09-25 RX ADMIN — LISINOPRIL 20 MG: 20 TABLET ORAL at 09:05

## 2020-09-25 RX ADMIN — ENOXAPARIN SODIUM 40 MG: 40 INJECTION SUBCUTANEOUS at 09:05

## 2020-09-25 RX ADMIN — OXYCODONE HYDROCHLORIDE 10 MG: 5 TABLET ORAL at 09:06

## 2020-09-25 RX ADMIN — AMLODIPINE BESYLATE 5 MG: 5 TABLET ORAL at 09:05

## 2020-09-25 RX ADMIN — CARVEDILOL 6.25 MG: 6.25 TABLET, FILM COATED ORAL at 09:06

## 2020-09-25 ASSESSMENT — ACTIVITIES OF DAILY LIVING (ADL)
ADLS_ACUITY_SCORE: 15

## 2020-09-25 ASSESSMENT — MIFFLIN-ST. JEOR: SCORE: 1071.25

## 2020-09-25 NOTE — PLAN OF CARE
Pt is A&Ox4, VSS ex bradycardic at times (lowest noted=55), on RA.  Dsgs to R groin and R leg incisions are CDI. Pulses palpable. Baseline numbness on top of R foot. LS clear, BS+ hypoactive, flatus-, voiding adequately. Pt is up SBA, on regular diet-denies N/V. PRN oxycodone for pain management, nicotine patch in place.

## 2020-09-25 NOTE — PLAN OF CARE
Alert and oriented x4. Vital signs stable on room air. Standby assist + gait belt and walker. Tolerating regular diet. Lung sounds clear. Bowel sounds active, + flatus, no BM since 9/22 - pt refused stool softeners. Adequate urine output. UTV right groin incision, dressing CDI. Right thigh incision RIN, WDL. Pain managed with PRN oxycodone. Denies nausea. Discharge teaching complete, questions answered, and medications reviewed. Discharging to home.

## 2020-09-25 NOTE — PROGRESS NOTES
[facility]    Vascular Medicine Progress Note    Date of Service (when I saw the patient): 09/25/2020     Assessment & Plan   1.  Peripheral arterial disease with history of aortobifemoral bypass graft and left femoral-popliteal bypass graft now with increased right lower extremity claudication and rest pain with known right SFA occlusion s/p right femoral graft limb to above-knee popliteal cadaveric superficial femoral artery bypass graft 9/23/20    -Palpable pedal and graft pulses  -Post-operative cares will be per Dr. Lozano / Vascular Surgery.   -Ambulate. Start PT / OT.  -Plavix has been resumed.  -She must stop smoking. She is under stress , not ready to quit .  IS  lovenox for DVT prphylaxis     2. Symptomatic high grade left carotid artery stenosis s/p left carotid endarterectomy 6/8/20     -She has recovered well from this surgery.   -She does have some new moderate restenosis to 60% on the right post right carotid CEA in the past. This will be followed as an outpatient by Dr. Lozano.      3. Hyperlipidemia     -LDL 62 -> at goal  -Continue Crestor 40 mg daily.      4. Hypertension     -BP low this am  -Continue prior to admission amlodipine, carvedilol, and lisinopril (with hold parameters)  -Monitor blood pressure closely.      5. Ongoing tobacco use     -She admits to ongoing tobacco use.   -Her mother just passed away yesterday and she is too stressed to discuss this further right now. She has asked that we discuss this with her later.   -Nicotine 21 mg patch daily.     Patient care time spent 35 minutes today    Interval History   Doing well. Pain controlled. Sleepy. Feet warm and palpable DP pulses bilaterally.    Physical Exam   Temp: 98.4  F (36.9  C) Temp src: Oral BP: 121/65 Pulse: 63   Resp: 17 SpO2: 93 % O2 Device: None (Room air) Oxygen Delivery: 2 LPM  Vitals:    09/23/20 0835 09/24/20 0700 09/25/20 0500   Weight: 52.6 kg (116 lb) 55.1 kg (121 lb 7.6 oz) 55.3 kg (121 lb 14.6 oz)      Vital Signs with Ranges  Temp:  [97.6  F (36.4  C)-98.9  F (37.2  C)] 98.4  F (36.9  C)  Pulse:  [55-63] 63  Resp:  [16-17] 17  BP: ()/(45-66) 121/65  SpO2:  [92 %-100 %] 93 %  I/O last 3 completed shifts:  In: 1819 [P.O.:1200; I.V.:619]  Out: 3900 [Urine:3900]    Constitutional: Awake, alert, cooperative, no apparent distress, and appears stated age.  Eyes: Lids and lashes normal, sclera clear, conjunctiva normal.  ENT: Normocephalic, without obvious abnormality, atraumatic, oral pharynx with moist mucus membranes  Respiratory: No increased work of breathing, good air exchange, clear to auscultation bilaterally, no crackles or wheezing.  Cardiovascular: Regular rate and rhythm, normal S1 and S2, no S3 or S4, and no murmur noted.  GI: Normal bowel sounds, soft, non-distended, non-tender  Skin: No bruising or bleeding, normal skin color, texture, turgor, no redness, warmth, or swelling, no rashes, surgical sites c/d/i.   Musculoskeletal: surgical site looks good.  Neurologic: Awake, alert, oriented to name, place and time.  Cranial nerves II-XII are grossly intact.    Neuropsychiatric: Calm, normal eye contact, alert, normal affect, oriented to self, place, time and situation, memory for past and recent events intact and thought process normal.  Pulses: Palpable right DP/PT and graft pulse. Palpable left DP pulse.     Medications     nitroPRUsside (NIPRIDE) IV infusion ADULT/PEDS GREATER than or EQUAL to 45 kg (std conc)       BETA BLOCKER NOT PRESCRIBED       sodium chloride Stopped (09/25/20 0751)       acetaminophen  975 mg Oral Q8H     amLODIPine  5 mg Oral BID     carvedilol  6.25 mg Oral BID w/meals     clopidogrel  75 mg Oral Daily     enoxaparin ANTICOAGULANT  40 mg Subcutaneous Q24H     fluticasone-vilanterol  1 puff Inhalation Daily     lisinopril  20 mg Oral BID     nicotine  1 patch Transdermal Daily with supper     nicotine   Transdermal Q8H     nitroGLYcerin  0.4 mg Sublingual See Admin  Instructions     omeprazole  20 mg Oral Daily     rosuvastatin  40 mg Oral At Bedtime     sodium chloride (PF)  3 mL Intracatheter Q8H       Data   Recent Labs   Lab 09/25/20  0807 09/24/20  0739 09/23/20 2059 09/23/20  0844   WBC 4.3 7.7  --   --    HGB 12.8 11.4*  --   --    MCV 95 94  --   --     114* 126*  --    INR  --  1.01  --   --     133  --   --    POTASSIUM 3.6 4.1  --  4.1   CHLORIDE 107 105  --   --    CO2 28 25  --   --    BUN 8 13  --   --    CR 0.53 0.47*  --  0.47*   ANIONGAP 4 3  --   --    SONIA 8.9 8.3*  --   --    GLC 90 82  --   --

## 2020-09-25 NOTE — DISCHARGE SUMMARY
Vascular Surgery Service Discharge Summary:     NAME: Shirley Hendricks   MRN: [id number]   : 1960   PCP: Vasquez Benoit    DATE OF ADMISSION: 2020       Procedure/Surgery Information   Procedure: Procedure(s):  RIGHT FEMORAL GRAFT TO ABOVE-KNEE POPLITEAL BYPASS USING CADAVERIC SUPERFICIAL FEMORAL ARTERY   Surgeon(s): Surgeon(s) and Role:     * Chadwick Lozano MD - Primary     * Keegan Diez MD - Fellow - Assisting   Specimens: * No specimens in log *         PRE/POSTOPERATIVE DIAGNOSES:    Disabling right leg claudication secondary to occlusion right superficial femoral artery.     PROCEDURES PERFORMED, 2020:   Right femoral graft limb to above-knee popliteal cadaveric superficial femoral artery bypass graft.    INTRAOPERATIVE FINDINGS: Please see Dr. Lozano's operative note for details on the intraoperative findings.    POSTOPERATIVE COMPLICATIONS: None    DATE OF DISCHARGE: 2020    ADMISSION HPI (from 2020):  This is a 59-year-old patient who has diffuse peripheral artery disease.  She has undergone a previous aortobifemoral bypass graft with a left femoral to above-knee popliteal NRTSV bypass using her right greater saphenous vein many years ago that has continued to function well.  She has also undergone carotid endarterectomies.  She has had progressively increasing claudication symptoms, right leg.  Superficial femoral artery is now occluded by CTA with a small but patent and disease free above-knee popliteal artery with 3-vessel runoff.  We felt that she would now benefit from a bypass graft due to increasing short distance claudication and occasional rest pain, but fortunately no ulcers.  She has no autogenous veins and with her small arteries, we felt the cadaveric superficial femoral artery graft would be the best conduit.  She comes to the operating room today under informed consent.     HOSPITAL COURSE: Shirley Hendricks is a 59 year old female who was  "admitted on 9/23/2020 and underwent the above-named procedures.   She tolerated the procedure well and postoperatively was transferred to the general post-surgical unit.  The remainder of her course was essentiallly uncomplicated.  Prior to discharge, her pain was controlled well with oral oxycodone.  She was able to perform ADLs and ambulate independently without difficulty, and had full return of bowel and bladder function.  On 9/25/2020, she was discharged to home in stable condition.  She is continuing on her Plavix and Crestor.  She has a strong DP pulse in the right leg and a palpable pulse in the graft as well.  The groin incision has nylon sutures and these will be removed in clinic in 2 weeks with Dr. Lozano.    Vascular Surgery Core Measures:  Crestor and Plavix    Physical Exam:    /61 (BP Location: Left arm)   Pulse 56   Temp 98.9  F (37.2  C) (Oral)   Resp 16   Ht 1.575 m (5' 2\")   Wt 55.3 kg (121 lb 14.6 oz)   SpO2 95%   BMI 22.30 kg/m       General: appears well, not in acute distress   CV: regular rate and rhythm   Resp: normal respiratory effort   Abd: soft and not distended   Right leg: dressings c/d/i with minimal strike-through, palpable pulse along graft in thigh and with doppler signal as well, strong palpable DP pulse, leg warm and well perfused, dressings taken down, medial thigh wound ope to air with steri-strips in place, groin wound with gauze replaced.    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 Simons dermatology     Embolism and thrombosis of unspecified artery (H) 8/00    Protein C,S, Leiden FV, Lupus Inhibitor Negative     Gastroesophageal reflux disease      Hyperlipidaemia      Hypertension      Lupus (H)     skin     Mild major depression (H) 11/7/2017     Myocardial infarction (H)     x3     " Osteoarthrosis, unspecified whether generalized or localized, unspecified site      PAD (peripheral artery disease) (H)      Peripheral vascular disease, unspecified (H) 12/00    s/p angioplasty with stent right femoral a.; Right iliac and femoral a. clot     Post-menopausal      Reflux esophagitis 2/04    EGD: esophagitis and medium HH     Uncomplicated asthma      Vitamin C deficiency 8/12/2018       PAST SURGICAL HISTORY:  Past Surgical History:   Procedure Laterality Date     ANGIOGRAM       BYPASS GRAFT FEMOROPOPLITEAL Right 9/23/2020    Procedure: RIGHT FEMORAL GRAFT TO ABOVE-KNEE POPLITEAL BYPASS USING CADAVERIC SUPERFICIAL FEMORAL ARTERY;  Surgeon: Chadwick Lozano MD;  Location:  OR      FABRIC WRAPPING OF ABDOMINAL ANEURYSM       C NONSPECIFIC PROCEDURE  12/00    angioplasty with stent right fem. a.     C NONSPECIFIC PROCEDURE  1987    sinus surgery     C NONSPECIFIC PROCEDURE  9/2/2009    Emergent left groin exploration with oversewing of bleeding angiographic site. 2. Endarterectomy of common femoral-proximal superficial femoral artery with greater saphenous vein patch angioplasty.   a. [place] of accessory right greater saphenous vein.      C NONSPECIFIC PROCEDURE  8/27/2009    occluded left common iliac and external iliac arteries were successfully revascularized with stenting to 8 and 7 mm      CARDIAC CATHERIZATION  9/3/2009    multivessel CAD without target lesions, med mgmt indicated, preserved ef     ENDARTERECTOMY CAROTID Right 5/11/2016    Procedure: ENDARTERECTOMY CAROTID;  Surgeon: Chadwick Lozano MD;  Location:  OR     ENDARTERECTOMY CAROTID Left 6/8/2020    Procedure: LEFT CAROTID ENDARTERECTOMY with distal facal vein patch  and EEG;  Surgeon: Chadwick Lozano MD;  Location:  OR     GYN SURGERY  left tube    left salpingectomy     LAPAROSCOPIC CHOLECYSTECTOMY N/A 9/27/2017    Procedure: LAPAROSCOPIC CHOLECYSTECTOMY;  LAPAROSCOPIC CHOLECYSTECTOMY;  Surgeon: Regina  Jacoby Tee MD;  Location: [facility]     ORTHOPEDIC SURGERY      left knee surgery     VASCULAR SURGERY  aoto bi fem  left fem-AK pop       Labs:  Recent Labs   Lab Test 09/24/20  0739 09/23/20  2059 09/14/20  1146   WBC 7.7  --  4.8   RBC 3.61*  --  4.00   HGB 11.4*  --  12.3   HCT 33.8*  --  37.8   MCV 94  --  95   MCH 31.6  --  30.8   MCHC 33.7  --  32.5   * 126* 158     Recent Labs   Lab Test 09/24/20  0739 09/23/20  0844 09/14/20  1146 06/11/20  0810   POTASSIUM 4.1 4.1 3.9 3.8   CHLORIDE 105  --  102 105   BUN 13  --  12 10       DISCHARGE INSTRUCTIONS:  Discharge Orders      Reason for your hospital stay    You were in the hospital to have surgery to increase blood flow in your arteries and to recover from surgery     Activity    Your activity upon discharge: activity as tolerated     Discharge Instructions    Your lower thigh incision was closed using surgical sutures that will dissolve on their own. You can leave the sutured incision open to air 24 hours after surgery as long as there is no active drainage from the site. If there is any active drainage from the site, keep a dressing over the site until the drainage has stopped for 24 hours. There are no specific cares needed aside from keeping the area clean and dry, but you can use an over the counter ointment such as bacitracin or neosporin on the incisions if you prefer (as long as there is no active drainage). You may shower normally once the site has been dry for 24 hours (otherwise keep site dry), but avoid scrubbing that area or using harsh soaps. Pat dry after shower. Do not soak in the tub, swim, or otherwise submerge your surgical site in water until your incision is well healed.  If at the incision site you start having new or different discharge/drainage, foul smell, or new redness and warmth to the touch, you should call the vascular   surgery office. Also call the office if you have a fever of 100.5F or greater.     Discharge  Instructions    Your upper thigh incision was closed using surgical sutures that will be removed at your follow up appointment. You can leave the sutured incision open to air 24 hours after surgery as long as there is no active drainage from the site. If there is any active drainage from the site, keep a dressing over the site until the drainage has stopped for 24 hours. There are no specific cares needed aside from keeping the area clean and dry, but you can use an over the counter ointment such as bacitracin or neosporin on the incisions if you prefer (as long as there is no active drainage). You may shower normally once the site has been dry for 24 hours (otherwise keep site dry), but avoid scrubbing that area or using harsh soaps. Pat dry after shower. Do not soak in the tub, swim, or otherwise submerge your surgical site in water until your sutures have been removed and your incision is well healed.  If at the incision site you start having new or different discharge/drainage, foul smell, or new redness and   warmth to the touch, you should call the vascular surgery office. Also call the office if you have a fever of 100.5F or greater.     Follow Up    Please call the [facility] ([facility]) at [phone] to schedule an outpatient follow-up appointment with Dr. Lozano in 1-2 weeks for post-op chesk. This is also the number you can call for any vascular surgery questions or concerns.    Address:  [address] [address]    Contact:  [phone]     Discharge Instructions    When you're at rest, make sure to elevate your legs (ankle above level of the knee and knee above the level of the hip for efficacy) to help control swelling in the legs. You may notice that you have more leg swelling as you go home and move around more and as the day goes on, so you should be diligent about elevating your legs to reduce/prevent swelling.     Diet    Follow this diet upon discharge: Advance to a  regular diet as tolerated     Discharge Medications   Current Discharge Medication List      START taking these medications    Details   oxyCODONE (ROXICODONE) 5 MG tablet Take 1-2 tablets (5-10 mg) by mouth every 3 hours as needed for moderate to severe pain  Qty: 20 tablet, Refills: 0    Associated Diagnoses: PAD (peripheral artery disease) (H)         CONTINUE these medications which have NOT CHANGED    Details   acetaminophen (TYLENOL) 500 MG tablet Take 500-1,000 mg by mouth every 6 hours as needed for mild pain      amLODIPine (NORVASC) 5 MG tablet Take 1 tablet (5 mg) by mouth 2 times daily  Qty: 180 tablet, Refills: 3    Associated Diagnoses: Essential hypertension      BREO ELLIPTA 200-25 MCG/INH Inhaler Inhale 1 puff into the lungs daily  Qty: 3 Inhaler, Refills: 3    Associated Diagnoses: Chronic obstructive pulmonary disease, unspecified COPD type (H); Tobacco use disorder      CALCIUM PO Take 1 tablet by mouth every evening      carvedilol 6.25 MG PO tablet Take 1 tablet (6.25 mg) by mouth 2 times daily (with meals)  Qty: 180 tablet, Refills: 3    Associated Diagnoses: Essential hypertension      clopidogrel (PLAVIX) 75 MG tablet Take 1 tablet (75 mg) by mouth daily  Qty: 90 tablet, Refills: 3    Associated Diagnoses: Peripheral vascular disease (H)      denosumab (PROLIA) 60 MG/ML SOLN injection Inject 1 mL (60 mg) Subcutaneous every 6 months INDICATION: TO TREAT OSTEOPOROSIS  Qty: 1 Syringe, Refills: 0    Associated Diagnoses: Osteoporosis without current pathological fracture, unspecified osteoporosis type      lisinopril (ZESTRIL) 20 MG tablet Take 1 tablet (20 mg) by mouth 2 times daily  Qty: 180 tablet, Refills: 2    Associated Diagnoses: Essential hypertension      nitroGLYcerin (NITROSTAT) 0.4 MG sublingual tablet Place 1 tablet (0.4 mg) under the tongue See Admin Instructions for chest pain  Qty: 30 tablet, Refills: 11    Associated Diagnoses: Coronary artery disease involving native coronary  artery of native heart without angina pectoris      omeprazole (PRILOSEC) 20 MG DR capsule TAKE ONE CAPSULE BY MOUTH DAILY  Qty: 90 capsule, Refills: 3    Associated Diagnoses: Reflux esophagitis      rosuvastatin (CRESTOR) 40 MG tablet Take 1 tablet (40 mg) by mouth At Bedtime  Qty: 90 tablet, Refills: 0    Associated Diagnoses: Hyperlipidemia LDL goal <70           Allergies   Allergies   Allergen Reactions     Contrast Dye Anaphylaxis     RASH, FACIAL AND NECK SWELLING, SOB, WHEEZING     Pantoprazole      Protonix caused diffuse edema     Chantix [Varenicline]      Terrible dreams     Penicillins Itching     Keegan Diez  Vascular Surgery Fellow    STAFF:  As above.  Doing very well.  Wds=A                   +3 pulse right and left grafts and DP pulses.                   ASA/Plavix.   Stop all smoking.                   Discussed care of incisions.  SR 2 weeks.       Chadwick Lozano MD

## 2020-09-25 NOTE — PLAN OF CARE
"PT Note 9/25/2020 8:24 AM    PT pepe attempted at scheduled time. Pt declined stating \"I am going home today\". Stated she has 2 sons and spouse to assist at home. Pt also reported having a 4WW at home that she is comfortable with and have no concerns for mobility at this time. PT will complete orders this date.   "

## 2020-09-25 NOTE — PROGRESS NOTES
"Vascular Surgery Progress Note:      S: doing ok this morning. The pain in her right leg is controlled. She is tolerating a regular diet. No nausea or emesis.  No chest pain or SOB.  She is ambulating well.  She would like to discharge to home.     O:     /61 (BP Location: Left arm)   Pulse 56   Temp 98.9  F (37.2  C) (Oral)   Resp 16   Ht 1.575 m (5' 2\")   Wt 55.3 kg (121 lb 14.6 oz)   SpO2 95%   BMI 22.30 kg/m      General: appears well, not in acute distress   CV: regular rate and rhythm   Resp: normal respiratory effort   Abd: soft and not distended   Right leg: dressings c/d/i with minimal strike-through, palpable pulse along graft in thigh and with doppler signal as well, strong palpable DP pulse, leg warm and well perfused, dressings taken down, medial thigh wound ope to air with steri-strips in place, groin wound with gauze replaced.     A/P:   Ms Hendricks is a 60 yo female who is POD#e from R CFA to above knee popliteal bypass with cadaveric graft for lifestyle limiting claudication. Doing well.      -continue regular diet   -IV to saline lock  -tylenol, oxycodone prn  -continue plavix   -lovenox for VTE prophylaxis   -continue pta statin, amlodipine, and coreg   -up with assist   -incentive spirometer  -anticipate discharge to home later today    Luke Pesonen  Vascular Surgery Fellow  "

## 2020-09-25 NOTE — PLAN OF CARE
Occupational Therapy Discharge Summary    Reason for therapy discharge:    Discharged to home.    Progress towards therapy goal(s). See goals on Care Plan in [facility] electronic health record for goal details.  Goals not met.  Barriers to achieving goals:   discharge from facility.    Therapy recommendation(s):    Home with assist for I/ADLs as needed.

## 2020-09-27 LAB
BLD PROD TYP BPU: NORMAL
BLD PROD TYP BPU: NORMAL
BLD UNIT ID BPU: 0
BLD UNIT ID BPU: 0
BLOOD PRODUCT CODE: NORMAL
BLOOD PRODUCT CODE: NORMAL
BPU ID: NORMAL
BPU ID: NORMAL
TRANSFUSION STATUS PATIENT QL: NORMAL

## 2020-09-28 ENCOUNTER — TELEPHONE (OUTPATIENT)
Dept: INTERNAL MEDICINE | Facility: CLINIC | Age: 60
End: 2020-09-28

## 2020-09-28 ENCOUNTER — TELEPHONE (OUTPATIENT)
Dept: OTHER | Facility: CLINIC | Age: 60
End: 2020-09-28

## 2020-09-28 NOTE — TELEPHONE ENCOUNTER
Please call the [facility] ([facility]) at [phone] to schedule an outpatient follow-up appointment with Dr. Loazno in 1-2 weeks for post-op chesk. This is also the number you can call for any vascular surgery questions or concerns.      Contact:  [phone]    ED / Discharge Outreach Protocol    Patient Contact    Attempt # 1    Was call answered?  No.  Left message on voicemail with information to call me back.    Simona RAMIREZ, RN, PHN

## 2020-09-28 NOTE — TELEPHONE ENCOUNTER
Pt is s/p Right femoral graft limb to above-knee popliteal cadaveric superficial femoral artery bypass graft.     Pt called, left vm noting her foot was swollen before she left hospital and now swelling has increased, has been icing it.     Would like to talk to someone about this.

## 2020-09-28 NOTE — TELEPHONE ENCOUNTER
I called pt back,   Pt reports right foot swelling increased since hospitalization.   Pt has been elevating and using ice pack.   Redness on back part of foot, painful.    Bruise on top of foot, numbness on top (unchanged from previous).     Pt will sent picture.     I explained to continue elevating and icing as she has been. We will review pictures.     Denies any further symptoms or concerns.     Awaiting pictures.     ASHLEY Johnson, RN    [facility]  Office:  [phone] Fax: [phone]

## 2020-09-29 NOTE — TELEPHONE ENCOUNTER
"  ED for acute condition Discharge Protocol    \"Hi, my name is Magdalena aHrley RN, a registered nurse, and I am calling from [facility].  I am calling to follow up and see how things are going for you after your recent emergency visit.\"    Tell me how you are doing now that you are home?\"     Pt states her incisions are looking good, but her foot is swollen and painful. Incisions are not \"oozing\" or notes any redness. R foot is swollen. Pt notes that she had some swelling after surgery, but it has gotten worse at home.     Pictures of feet were already sent to vascular for triage, according to pt.     No fevers, no new onset SOB, no cardia sx, no neuro sx.      Discharge Instructions    \"Let's review your discharge instructions.  What is/are the follow-up recommendations?  Pt. Response: Follow up with vascular for recheck and stitch removal.    \"Has an appointment with your primary care provider been scheduled?\"  No (not needed) Pt is following with vascular    Medications    \"Tell me what changed about your medicines when you discharged?\"    Pt has medication for pain, short term.    \"What questions do you have about your medications?\"   None        Call Summary    \"What questions or concerns do you have about your recent visit and your follow-up care?\"     vascular concerns. . Advice given: pt is already speaking with vascular nurse    \"If you have questions or things don't continue to improve, we encourage you contact us through the main clinic number (give number).  Even if the clinic is not open, triage nurses are available 24/7 to help you.     We would like you to know that our clinic has extended hours (provide information).  We also have urgent care (provide details on closest location and hours/contact info)\"    \"Thank you for your time and take care!\"                "

## 2020-10-07 ENCOUNTER — TELEPHONE (OUTPATIENT)
Dept: OTHER | Facility: CLINIC | Age: 60
End: 2020-10-07

## 2020-10-07 DIAGNOSIS — L03.90 CELLULITIS: Primary | ICD-10-CM

## 2020-10-07 RX ORDER — CLINDAMYCIN HCL 300 MG
300 CAPSULE ORAL 4 TIMES DAILY
Qty: 28 CAPSULE | Refills: 0 | Status: SHIPPED | OUTPATIENT
Start: 2020-10-07 | End: 2020-10-15

## 2020-10-07 NOTE — TELEPHONE ENCOUNTER
VORB clindamycin 300mg PO QID x7 days.  Pt updated, confirmed preferred pharmacy.  Pt verbalized understanding and instructions.      Alaina Yanez, BSN, RN-[facility]

## 2020-10-07 NOTE — TELEPHONE ENCOUNTER
Pt is s/p right femoral graft to above knee popliteal bypass with cadaveric SFA on 9/23/20.  Pt called to report she was taking a shower today and afterwards she noticed redness at incision site.  She denies fever, minimal serous drainage.  She reports the incision is uncomfortable and itchy.  Pt has nylon sutures in place.  Pt provided the below picture to review with Dr. Lozano.  Pt is scheduled for 1st PO with Dr. Lozano on 10/15/20.  Routing to Dr. Lozano to review.    Alaina Yanez, BSN, RN-[facility] [place]

## 2020-10-11 ENCOUNTER — NURSE TRIAGE (OUTPATIENT)
Dept: NURSING | Facility: CLINIC | Age: 60
End: 2020-10-11

## 2020-10-11 NOTE — TELEPHONE ENCOUNTER
Shirley had right femoral graft on 9/23/20    She called today with a concern about an area on the lower part of her incision.  This is a small pea size opening on the lower part of her incision. She thinks there had been a stitch there at one time.    There is what looks like pus in this open area.  In general her incision pain is less and the swelling is down except at the area that's open with pus.    She wanted to speak to Dr Lozano or the on call.  I asked to have the on call, Dr Fernie dcd to call Shirley, [phone].    The page went out at 12:19 pm.  I asked Shirley to call back to request a second page if she hasn't received a call by 12:40 p.m.    She stated understanding and agreement.    COVID 19 Nurse Triage Plan/Patient Instructions    Please be aware that novel coronavirus (COVID-19) may be circulating in the community. If you develop symptoms such as fever, cough, or SOB or if you have concerns about the presence of another infection including coronavirus (COVID-19), please contact your health care provider or visit [email or web address].     Disposition/Instructions    Home care recommended. Follow home care protocol based instructions.    Thank you for taking steps to prevent the spread of this virus.  o Limit your contact with others.  o Wear a simple mask to cover your cough.  o Wash your hands well and often.    Resources    [facility]: About COVID-19: [email or web address]    CDC: What to Do If You're Sick: www.cdc.gov/coronavirus/2019-ncov/about/steps-when-sick.html    CDC: Ending Home Isolation: www.cdc.gov/coronavirus/2019-ncov/hcp/disposition-in-home-patients.html     CDC: Caring for Someone: www.cdc.gov/coronavirus/2019-ncov/if-you-are-sick/care-for-someone.html     [facility]: Interim Guidance for Hospital Discharge to Home: www.health.[facility].mn.us/diseases/coronavirus/hcp/hospdischarge.pdf    [facility] clinical trials (COVID-19 research studies):  [email or web address]-[email or web address]-[email or web address]     Below are the COVID-19 hotlines at the [place] Department of Health ([facility]). Interpreters are available.   o For health questions: Call 813-301-8596 or 1-827.728.5154 (7 a.m. to 7 p.m.)  o For questions about schools and childcare: Call 334-724-2587 or 1-179.840.3360 (7 a.m. to 7 p.m.)     Reason for Disposition    [1] Incision gaping open AND [2] < 48 hours since wound re-opened    Additional Information    Negative: [1] Major abdominal surgical incision AND [2] wound gaping open AND [3] visible internal organs    Negative: Sounds like a life-threatening emergency to the triager    Negative: [1] Bleeding from incision AND [2] won't stop after 10 minutes of direct pressure    Negative: [1] Widespread rash AND [2] bright red, sunburn-like    Negative: Severe pain in the incision    Protocols used: POST-OP INCISION SYMPTOMS-A-AUTUMN BERGERON RN [facility]

## 2020-10-14 NOTE — PROGRESS NOTES
[place] VASCULAR [facility]    Shirley Hendricks has undergone a right femoral limb graft (ABF) to above-knee popliteal cadaveric SFA bypass due to short distance disabling claudication with good runoff on 9/23/2020.  She did well postprocedure with a +3 palpable graft/DP/PT pulses.  Continue on her aspirin/Plavix along with Crestor.  Still occasional smoking cigars and encouraged to quit.    She did send us a photo of the right groin several days ago with some mild erythema noted.  This is very concerning since she has had several operations and does have the underlying dacron ABF.  We thus placed her on antibiotics with follow-up today (clindamycin).    She is noted to areas of incision of partially broken down.  Does have a small amount of drainage on her dressing at the end of the day.  No fever or chills.  Redness that was noted on the photograph has improved.    Exam: Alert and appropriate.  Walking easily.             Blood pressure 115/75.  Pulse 60             +3 right graft and left graft pulses              Right groin incision closed with nylon sutures.  2 areas of breakdown to the subcutaneous tissue.                He is areas show that the sutures had pulled through on the skin but the underlying Vicryl sutures                     are intact.               No erythema.  Distal thigh incision is healing well.  Mild distal edema.    Procedure: Groin wound was prepped.  Using a #15 blade scalpel I removed the nylon sutures that had pulled through the skin and slightly debrided the underlying subcutaneous tissue.  This appears to be superficial.  There is no drainage.  Good bleeding.  No purulence.  The 2 open areas were packed with Aquacel Ag.    Impression: Appears we have superficial breakdown of the groin incision.  This is obviously concerned with her bypass graft.  I will keep her on additional week of clindamycin with improvement of the erythema noted.  She will cleanse the wound and apply  the [employer] daily.  He will see me again in 1 week and call if there is any concerns.       Chadwick Lozano MD

## 2020-10-15 ENCOUNTER — TELEPHONE (OUTPATIENT)
Dept: OTHER | Facility: CLINIC | Age: 60
End: 2020-10-15

## 2020-10-15 ENCOUNTER — OFFICE VISIT (OUTPATIENT)
Dept: OTHER | Facility: CLINIC | Age: 60
End: 2020-10-15
Attending: SURGERY
Payer: COMMERCIAL

## 2020-10-15 VITALS — DIASTOLIC BLOOD PRESSURE: 75 MMHG | HEART RATE: 60 BPM | SYSTOLIC BLOOD PRESSURE: 115 MMHG

## 2020-10-15 DIAGNOSIS — I73.9 PAD (PERIPHERAL ARTERY DISEASE) (H): ICD-10-CM

## 2020-10-15 DIAGNOSIS — S31.109A RIGHT GROIN WOUND, INITIAL ENCOUNTER: Primary | ICD-10-CM

## 2020-10-15 PROCEDURE — G0463 HOSPITAL OUTPT CLINIC VISIT: HCPCS

## 2020-10-15 PROCEDURE — A6235 HYDROCOLLD DRG >16<=48 W/O B: HCPCS

## 2020-10-15 PROCEDURE — 99024 POSTOP FOLLOW-UP VISIT: CPT | Performed by: SURGERY

## 2020-10-15 RX ORDER — CLINDAMYCIN HCL 300 MG
300 CAPSULE ORAL 4 TIMES DAILY
Qty: 28 CAPSULE | Refills: 0 | Status: SHIPPED | OUTPATIENT
Start: 2020-10-15 | End: 2021-01-05

## 2020-10-15 NOTE — TELEPHONE ENCOUNTER
Short term disability claim form faxed to [employer] at [phone], confirmed via RightFax on 10/15/20 at 1804.  Forms placed in FMLA binder at MA desk.    Per pt request, office notes from 10/15/20 faxed to the  at [employer], gayathri Lind at [phone].  Confirmed via RightFax on 10/15/20 at 1803.    Wheely message sent to patient with update.    Alaina Yanez, ESTHERN, RN-[facility] [place]

## 2020-10-15 NOTE — PROGRESS NOTES
"Shirley Hendricks is a 60 year old female who presents for:  Chief Complaint   Patient presents with     RECHECK     PO W suture removal to 09/23/2020 RIGHT FEMORAL GRAFT TO ABOVE-KNEE POPLITEAL BYPASS USING CADAVERIC SUPERFICIAL FEMORAL ARTERY        Vitals:    Vitals:    10/15/20 1515   BP: 115/75   BP Location: Left arm   Patient Position: Chair   Cuff Size: Adult Regular   Pulse: 60       BMI:  Estimated body mass index is 22.3 kg/m  as calculated from the following:    Height as of 9/23/20: 5' 2\" (1.575 m).    Weight as of 9/25/20: 121 lb 14.6 oz (55.3 kg).    Pain Score:  Data Unavailable        Hanny Al MA    "

## 2020-10-21 DIAGNOSIS — E78.5 HYPERLIPIDEMIA LDL GOAL <70: ICD-10-CM

## 2020-10-21 NOTE — PROGRESS NOTES
[place] VASCULAR [facility]    Shirley Hendricks has a history of multiple vascular issues.  She had undergone a previous aortobifemoral bypass graft and had a known right SFA occlusion.  This became much more symptomatic.  Her left femoral to popliteal in situ bypass graft is functioning well with no left leg claudication symptoms.    We performed a cadaveric SFA graft bypass from the right femoral limb of the graft to the above-knee popliteal artery on 9/23/2020.  Excellent distal pulses were noted with good runoff.  Despite meticulous groin closures developed a superficial breakdown.  Placed her on clindamycin and performed office debridement on 10/15/2020.  Treated with Aquacel Ag.  This is very concerning to the underlying dacron ABF graft and new bypass.    She has done well since last week.  She is using the Aquacel Ag every day with very minimal if any drainage.  The erythema has completely resolved.  She does have some dependent edema in her calf and ankle as expected but her preoperative ischemic pain has resolved.  She has 2 days left of her antibiotics.    Exam: Alert and appropriate.             Blood pressure 121/75.  Pulse 62.              2 openings in the right groin incision both measuring less and 1 cm in diameter and calf.  Mild amount of slough is noted.  Knot of the Vicryl suture is noted in the distal wound.  No purulence.  Subcutaneous deeper tissue appears to be intact and healthy.               +3 palpable right graft and DP pulse.               Mild ankle edema.      Procedure: Timeout was called and sites were identified.  Using a #15 blade scalpel a full-thickness/excisional debridement was performed removing some of the slough down to bleeding subcutaneous tissue.  Skin edges slightly debrided also.  I removed the 3 remaining nylon sutures.  Skin bridges have healed well.  Redressed with Aquacel Ag      Impression: Improvement of right groin wound.  Fortunately superficial.  This  will need to heal by secondary intent.  Continue with Aquacel Ag on a daily basis.  Finish off antibiotics and call if there is any recurrence of the erythema or drainage.        Spandagrip for calf and ankle edema along with leg elevation.         Patient works as a .  Scheduled to go back to work on 11/3/2020.  Since this is her right leg which runs the accelerator and brake and the necessity of her sitting on the bus all day we will delay her return to work.  I will see her in the office in 2 weeks also and discussed this again.       Chadwick Lozano

## 2020-10-22 ENCOUNTER — DOCUMENTATION ONLY (OUTPATIENT)
Dept: OTHER | Facility: CLINIC | Age: 60
End: 2020-10-22

## 2020-10-22 ENCOUNTER — OFFICE VISIT (OUTPATIENT)
Dept: OTHER | Facility: CLINIC | Age: 60
End: 2020-10-22
Attending: SURGERY
Payer: COMMERCIAL

## 2020-10-22 VITALS — SYSTOLIC BLOOD PRESSURE: 121 MMHG | HEART RATE: 62 BPM | DIASTOLIC BLOOD PRESSURE: 75 MMHG

## 2020-10-22 DIAGNOSIS — I73.9 PAD (PERIPHERAL ARTERY DISEASE) (H): ICD-10-CM

## 2020-10-22 DIAGNOSIS — L98.492 RIGHT GROIN ULCER, WITH FAT LAYER EXPOSED (H): Primary | ICD-10-CM

## 2020-10-22 PROCEDURE — G0463 HOSPITAL OUTPT CLINIC VISIT: HCPCS

## 2020-10-22 PROCEDURE — 99024 POSTOP FOLLOW-UP VISIT: CPT | Performed by: SURGERY

## 2020-10-22 PROCEDURE — A6235 HYDROCOLLD DRG >16<=48 W/O B: HCPCS

## 2020-10-22 RX ORDER — ROSUVASTATIN CALCIUM 40 MG/1
40 TABLET, COATED ORAL AT BEDTIME
Qty: 90 TABLET | Refills: 2 | Status: SHIPPED | OUTPATIENT
Start: 2020-10-22 | End: 2021-07-25

## 2020-10-22 NOTE — PROGRESS NOTES
Per pt's request, 10/22/20 office notes faxed to her .  Notes faxed to [phone] attn Levy Lind.  Confirmed via RightFax on 10/22/20 at 1656.  Alaina Yanez, ESTHERN, RN-[facility] [place]

## 2020-10-22 NOTE — PROGRESS NOTES
"Shirley Hendricks is a 60 year old female who presents for:  Chief Complaint   Patient presents with     RECHECK     1 week follow up; right groin incision check        Vitals:    Vitals:    10/22/20 1352   BP: 121/75   BP Location: Left arm   Patient Position: Chair   Cuff Size: Adult Regular   Pulse: 62       BMI:  Estimated body mass index is 22.3 kg/m  as calculated from the following:    Height as of 9/23/20: 5' 2\" (1.575 m).    Weight as of 9/25/20: 121 lb 14.6 oz (55.3 kg).    Pain Score:  Data Unavailable        Hanny Al MA    "

## 2020-11-03 NOTE — PROGRESS NOTES
[place] VASC[facility]    Shirley Hendricks returns for follow-up of her superficial right groin dehiscence following a right femoral graft limb to above-knee popliteal cadaveric SFA bypass for symptomatic occlusion of the right SFA with good runoff.    She has had a very good week since we saw her.  She is finished all of her antibiotics.  No drainage or erythema from the groin incisions that have decreased in size so much that she cannot apply the Aquacel Ag.    She was able to obtain knee-high athletic compression stockings from her daughter and these are working very well controlling her swelling.    Exam: Alert and appropriate.  Very comfortable.             Blood pressure 116/68.  Pulse 55.             Right groin area continues to improve.  No erythema or induration.  2 small openings.  Both measuring less than 0.5 x 0.5 x 0.5 cm with very clean bases.              +3 palpable right and left graft pulse with good Doppler signals in both grafts in the right ankle PT.    Procedure: Timeout was called and sites were identified in the right groin.  No need for anesthetic.  Selective debridement performed with 15 blade scalpel remove the slough.  Very healthy tissue is noted.  Redressed with bacitracin ointment and Band-Aid.    Impression: #1.  Groin area is doing very well.  Much less concerned now.  May not take a bath but fine to shower.  Apply bacitracin ointment and Band-Aid allowed to heal by secondary intent.              #2.  Patent right left leg bypass graft with good distal perfusion.  Follow-up duplex of the right graft in approximately 4 weeks.  Continue on aspirin and Plavix.              #4.  Patient is a  for [employer].  She has been on disability for leg surgery.  She may return to work on 11/10/2020 with no restrictions at all.      Chadwick Lozano

## 2020-11-05 ENCOUNTER — OFFICE VISIT (OUTPATIENT)
Dept: OTHER | Facility: CLINIC | Age: 60
End: 2020-11-05
Attending: SURGERY
Payer: COMMERCIAL

## 2020-11-05 ENCOUNTER — DOCUMENTATION ONLY (OUTPATIENT)
Dept: OTHER | Facility: CLINIC | Age: 60
End: 2020-11-05

## 2020-11-05 VITALS — DIASTOLIC BLOOD PRESSURE: 68 MMHG | SYSTOLIC BLOOD PRESSURE: 116 MMHG | RESPIRATION RATE: 14 BRPM | HEART RATE: 55 BPM

## 2020-11-05 DIAGNOSIS — L98.492 RIGHT GROIN ULCER, WITH FAT LAYER EXPOSED (H): ICD-10-CM

## 2020-11-05 DIAGNOSIS — I73.9 PAD (PERIPHERAL ARTERY DISEASE) (H): Primary | ICD-10-CM

## 2020-11-05 PROCEDURE — 99024 POSTOP FOLLOW-UP VISIT: CPT | Performed by: SURGERY

## 2020-11-05 PROCEDURE — G0463 HOSPITAL OUTPT CLINIC VISIT: HCPCS

## 2020-11-05 NOTE — PROGRESS NOTES
"Shirley Hendricks is a 60 year old female who presents for:  Chief Complaint   Patient presents with     RECHECK     [id number]  2 week f/u to 10/22/20 appt; right groin wound check *nicho        Vitals:    Vitals:    11/05/20 1047   BP: 116/68   BP Location: Right arm   Patient Position: Chair   Cuff Size: Adult Regular   Pulse: 55   Resp: 14       BMI:  Estimated body mass index is 22.3 kg/m  as calculated from the following:    Height as of 9/23/20: 5' 2\" (1.575 m).    Weight as of 9/25/20: 121 lb 14.6 oz (55.3 kg).    Pain Score:  Data Unavailable        Santos Garibay CMA    "

## 2020-11-05 NOTE — PROGRESS NOTES
Per pt request, 11/5/20 office notes faxed to her , Levy Lind, at [phone].    Confirmed via RightFax on 11/5/20 at 6906.    Alaina Yanez, ESTHERN, RN-[facility] [place]

## 2020-11-11 ENCOUNTER — HOSPITAL ENCOUNTER (OUTPATIENT)
Dept: ULTRASOUND IMAGING | Facility: CLINIC | Age: 60
Discharge: HOME | End: 2020-11-11
Attending: SURGERY | Admitting: SURGERY
Payer: COMMERCIAL

## 2020-11-11 DIAGNOSIS — I73.9 PAD (PERIPHERAL ARTERY DISEASE) (H): ICD-10-CM

## 2020-11-11 PROCEDURE — 93926 LOWER EXTREMITY STUDY: CPT | Mod: RT

## 2020-11-17 NOTE — PROGRESS NOTES
[place] VASCULAR [facility]    Shirley Hendricks has a long history of vascular problems.  She has undergone CEAs.  Also aortobifemoral bypass graft and left femoral to popliteal in situ bypass graft many years ago.  Worsening claudication in the right leg due to occlusion of the SFA.  Underwent a right femoral limb graft to above-knee popliteal cadaveric SFA bypass with good runoff.  She had issues with superficial breakdown of the groin incision that we treated with a course of antibiotics for cellulitis and wound care with improvement noted.    Last visit on 11/5/2020 revealed a ulceration measuring less than 0.5 x 0.5 cm with a clean base.  Being treated with bacitracin ointment.  On aspirin and Plavix for bypass graft with a schedule duplex approximately 4 weeks .  I discussed returning to work on 11/10/2020 with [employer] where she is a .    Overall she continues to do well.  Her groin incision has essentially healed over and she is just putting a protective Band-Aid.  Has noticed swelling in her calf and forefoot.  She tried an over-the-counter compression stocking but this is cutting into her calf.    Exam: Alert and appropriate.  Very comfortable.              Blood pressure 148/77.  Pulse 60              Essential healing of the right groin with no swelling or erythema               +3 palpable pulse in the right and left grafts.  +3 right dorsalis pedis pulse                      Triphasic Doppler in the right DP and PT with some hyperemic flow               Mild right calf and foot edema.  No cellulitis.      Graft duplex was performed on 11/11/2020.  Proximal graft is not visualized due to the bandage on her groin incision but no obvious seroma.  Graft was widely patent with an average diameter of over 8 mm.  Distal anastomosis had an elevated velocity but goes down to a 4 mm artery with no obvious stenosis.    Impression: #1.  Patent redo right femoral to above-knee popliteal  cadaveric SFA bypass graft with excellent outflow.  Continue on present medications including Plavix.  Recheck duplex in 3 months.                        #2.  Healed superficial right groin wound breakdown.  May start to bathe but avoid any prolonged soaking.  Protective Band-Aid is needed.                        #3.  Postoperative calf and foot edema which is quite common.  I would given her a size E Spandagrip to wear which will be much easier to use than the formal Jobst type compression stockings.      Chadwick Lozano MD

## 2020-11-19 ENCOUNTER — OFFICE VISIT (OUTPATIENT)
Dept: OTHER | Facility: CLINIC | Age: 60
End: 2020-11-19
Attending: SURGERY
Payer: COMMERCIAL

## 2020-11-19 VITALS — SYSTOLIC BLOOD PRESSURE: 148 MMHG | HEART RATE: 60 BPM | DIASTOLIC BLOOD PRESSURE: 77 MMHG

## 2020-11-19 DIAGNOSIS — I73.9 PAD (PERIPHERAL ARTERY DISEASE) (H): Primary | ICD-10-CM

## 2020-11-19 PROCEDURE — G0463 HOSPITAL OUTPT CLINIC VISIT: HCPCS

## 2020-11-19 PROCEDURE — 99213 OFFICE O/P EST LOW 20 MIN: CPT | Performed by: SURGERY

## 2020-11-19 NOTE — PROGRESS NOTES
"Shirley Hendricks is a 60 year old female who presents for:  Chief Complaint   Patient presents with     RECHECK       History of right femoral graft limb to above knee popliteal bypass with cadaveric SFA on 9/23/20; 1 month follow up to 11/5/20 appointment with Dr. Lozano.        Vitals:    Vitals:    11/19/20 1303   BP: (!) 148/77   BP Location: Right arm   Patient Position: Chair   Cuff Size: Adult Regular   Pulse: 60       BMI:  Estimated body mass index is 22.3 kg/m  as calculated from the following:    Height as of 9/23/20: 5' 2\" (1.575 m).    Weight as of 9/25/20: 121 lb 14.6 oz (55.3 kg).    Pain Score:  Data Unavailable        Hanny Al MA    "

## 2020-12-10 DIAGNOSIS — M81.0 OSTEOPOROSIS, UNSPECIFIED OSTEOPOROSIS TYPE, UNSPECIFIED PATHOLOGICAL FRACTURE PRESENCE: ICD-10-CM

## 2021-01-05 ENCOUNTER — OFFICE VISIT (OUTPATIENT)
Dept: INTERNAL MEDICINE | Facility: CLINIC | Age: 61
End: 2021-01-05
Payer: COMMERCIAL

## 2021-01-05 ENCOUNTER — ANCILLARY PROCEDURE (OUTPATIENT)
Dept: GENERAL RADIOLOGY | Facility: CLINIC | Age: 61
End: 2021-01-05
Attending: INTERNAL MEDICINE
Payer: COMMERCIAL

## 2021-01-05 VITALS
WEIGHT: 115 LBS | RESPIRATION RATE: 16 BRPM | HEART RATE: 53 BPM | BODY MASS INDEX: 21.03 KG/M2 | SYSTOLIC BLOOD PRESSURE: 124 MMHG | OXYGEN SATURATION: 97 % | TEMPERATURE: 97.5 F | DIASTOLIC BLOOD PRESSURE: 76 MMHG

## 2021-01-05 DIAGNOSIS — Z23 NEED FOR PROPHYLACTIC VACCINATION AND INOCULATION AGAINST INFLUENZA: ICD-10-CM

## 2021-01-05 DIAGNOSIS — F17.200 TOBACCO USE DISORDER: ICD-10-CM

## 2021-01-05 DIAGNOSIS — R63.4 WEIGHT LOSS: ICD-10-CM

## 2021-01-05 DIAGNOSIS — F32.1 MODERATE MAJOR DEPRESSION (H): ICD-10-CM

## 2021-01-05 DIAGNOSIS — G62.9 NEUROPATHY: ICD-10-CM

## 2021-01-05 LAB
ALBUMIN SERPL-MCNC: 3.6 G/DL (ref 3.4–5)
ALP SERPL-CCNC: 84 U/L (ref 40–150)
ALT SERPL W P-5'-P-CCNC: 25 U/L (ref 0–50)
ANION GAP SERPL CALCULATED.3IONS-SCNC: 3 MMOL/L (ref 3–14)
AST SERPL W P-5'-P-CCNC: 17 U/L (ref 0–45)
B BURGDOR IGG+IGM SER QL: 0.13 (ref 0–0.89)
BILIRUB SERPL-MCNC: 0.3 MG/DL (ref 0.2–1.3)
BUN SERPL-MCNC: 11 MG/DL (ref 7–30)
CALCIUM SERPL-MCNC: 9 MG/DL (ref 8.5–10.1)
CHLORIDE SERPL-SCNC: 104 MMOL/L (ref 94–109)
CO2 SERPL-SCNC: 27 MMOL/L (ref 20–32)
CREAT SERPL-MCNC: 0.58 MG/DL (ref 0.52–1.04)
ERYTHROCYTE [DISTWIDTH] IN BLOOD BY AUTOMATED COUNT: 13.2 % (ref 10–15)
ERYTHROCYTE [SEDIMENTATION RATE] IN BLOOD BY WESTERGREN METHOD: 15 MM/H (ref 0–30)
GFR SERPL CREATININE-BSD FRML MDRD: >90 ML/MIN/{1.73_M2}
GLUCOSE SERPL-MCNC: 89 MG/DL (ref 70–99)
HCT VFR BLD AUTO: 39.8 % (ref 35–47)
HGB BLD-MCNC: 12.8 G/DL (ref 11.7–15.7)
MCH RBC QN AUTO: 30.7 PG (ref 26.5–33)
MCHC RBC AUTO-ENTMCNC: 32.2 G/DL (ref 31.5–36.5)
MCV RBC AUTO: 95 FL (ref 78–100)
PLATELET # BLD AUTO: 133 10E9/L (ref 150–450)
POTASSIUM SERPL-SCNC: 4.4 MMOL/L (ref 3.4–5.3)
PROT SERPL-MCNC: 7.2 G/DL (ref 6.8–8.8)
RBC # BLD AUTO: 4.17 10E12/L (ref 3.8–5.2)
SODIUM SERPL-SCNC: 134 MMOL/L (ref 133–144)
TSH SERPL DL<=0.005 MIU/L-ACNC: 0.77 MU/L (ref 0.4–4)
VIT B12 SERPL-MCNC: 585 PG/ML (ref 193–986)
WBC # BLD AUTO: 5.9 10E9/L (ref 4–11)

## 2021-01-05 PROCEDURE — 84165 PROTEIN E-PHORESIS SERUM: CPT | Performed by: PATHOLOGY

## 2021-01-05 PROCEDURE — 85027 COMPLETE CBC AUTOMATED: CPT | Performed by: INTERNAL MEDICINE

## 2021-01-05 PROCEDURE — 84443 ASSAY THYROID STIM HORMONE: CPT | Performed by: INTERNAL MEDICINE

## 2021-01-05 PROCEDURE — 36415 COLL VENOUS BLD VENIPUNCTURE: CPT | Performed by: INTERNAL MEDICINE

## 2021-01-05 PROCEDURE — 86618 LYME DISEASE ANTIBODY: CPT | Performed by: INTERNAL MEDICINE

## 2021-01-05 PROCEDURE — 82607 VITAMIN B-12: CPT | Performed by: INTERNAL MEDICINE

## 2021-01-05 PROCEDURE — 80053 COMPREHEN METABOLIC PANEL: CPT | Performed by: INTERNAL MEDICINE

## 2021-01-05 PROCEDURE — 90682 RIV4 VACC RECOMBINANT DNA IM: CPT | Performed by: INTERNAL MEDICINE

## 2021-01-05 PROCEDURE — 71046 X-RAY EXAM CHEST 2 VIEWS: CPT | Performed by: RADIOLOGY

## 2021-01-05 PROCEDURE — 90471 IMMUNIZATION ADMIN: CPT | Performed by: INTERNAL MEDICINE

## 2021-01-05 PROCEDURE — 85652 RBC SED RATE AUTOMATED: CPT | Performed by: INTERNAL MEDICINE

## 2021-01-05 PROCEDURE — 99214 OFFICE O/P EST MOD 30 MIN: CPT | Mod: 25 | Performed by: INTERNAL MEDICINE

## 2021-01-05 PROCEDURE — 99N1036 PR STATISTIC TOTAL PROTEIN: Performed by: INTERNAL MEDICINE

## 2021-01-05 ASSESSMENT — PATIENT HEALTH QUESTIONNAIRE - PHQ9: SUM OF ALL RESPONSES TO PHQ QUESTIONS 1-9: 10

## 2021-01-05 NOTE — PROGRESS NOTES
ASSESSMENT:   1. Neuropathy  Labs ordered to look for metabolic cause.  If normal, will order EMGs and refer to neurology  - TSH with free T4 reflex  - CBC with platelets  - Comprehensive metabolic panel  - Erythrocyte sedimentation rate auto  - Protein electrophoresis  - Vitamin B12  - Lyme Disease Wen with reflex to WB Serum    2. Weight loss  Denies abdominal pain.  Labs as ordered and chest x-ray with smoking history.  Encouraged improved caloric intake.  If labs and CXR unremarkable, possible CT abdomen  - TSH with free T4 reflex  - CBC with platelets  - Comprehensive metabolic panel  - XR Chest 2 Views; Future    3. Tobacco use disorder  Counseled regarding smoking cessation.  Patient declines at this time.  X-ray with weight loss  - XR Chest 2 Views; Future     4. Moderate major depression (H)   PHQ9 = 10.  Denies suicidal ideation.  Seeing therapist.  Recommended SSRI therapy but patient declines at this time.  We will therefore monitor    5. Need for prophylactic vaccination and inoculation against influenza  Due for flu vaccine  - INFLUENZA QUAD, RECOMBINANT, P-FREE (RIV4) (FLUBLOCK) [03280]     PLAN:  Labs as ordered  Continue current medications.  If willing to start future medication therapy for depression, then contact clinic.  Continue working with mental health therapist/counselor  I encourage you to stop all smoking.  If  willing to quit in the future,  contact the clinic if interested in prescription therapy (Chantix/Buproprion) or contact  [facility] [facility] toll-free number (0-873-QUIT-NOW) or through the internet  (quitpartnermn.[email or web address]) for free nicotine supplementation treatment and/or counseling  Increase nonsugar starchy food and protein intake in diet and monitor weight.  If not improving over the next 3 to 4 weeks and lab/x-ray unremarkable, then inform physician and will order imaging of the abdomen  If labs unremarkable for causes of neuropathy in the hands and feet, will get EMG study of the  "extremity nerves and refer to neurology  Flu vaccine        (Chart documentation was completed, in part, with [employer] voice-recognition software. Even though reviewed, some grammatical, spelling, and word errors may remain.)    Vasquez Benoit MD  Internal Medicine Department  [facility]      Subjective     Shirley Hendricks is a 60 year old female who presents to clinic today for the following health issues     HPI   Chief Complaint   Patient presents with     Loss of Appetite     Patient states she has to \"make\" herself eat, patient weight is between 108-115     Fatigue     Patient c/o low energy      Depression     Patient states there is some conecrn opf Depression, patient has therapist     Hand Problem     Patient c/o loss of feeling in hands     Most recent lab results reviewed with pt.      Weight down 7 pounds since September and 17 pounds in 1 year.  General decreased appetite.  Denies abdominal pain.  No diarrhea.  No vomiting.  Rare nausea.  History of pressure.  Working with therapist.  Denies suicidal ideation.  Numbness in bilateral hands.  Some also in toes bilaterally.  Rarely dropping objects.  Denies proximal extremity weakness  No chest pain, shortness of breath  Denies neck or back pain.  Smoking 1/2 pack cigarettes per day.  Not motivated to quit at this time        PHQ-9 ([employer]) 1/5/2021   1.  Little interest or pleasure in doing things 1   2.  Feeling down, depressed, or hopeless 3   3.  Trouble falling or staying asleep, or sleeping too much 0   4.  Feeling tired or having little energy 3   5.  Poor appetite or overeating 3   6.  Feeling bad about yourself 0   7.  Trouble concentrating 0   8.  Moving slowly or restless 0   9.  Suicidal or self-harm thoughts 0   PHQ-9 Total Score 10   Difficulty at work, home, or with people Somewhat difficult     Additional ROS:   Constitutional, HEENT, Cardiovascular, Pulmonary, GI and , Neuro, MSK and Psych review of " "systems/symptoms are otherwise negative or unchanged from previous, except as noted above.      OBJECTIVE:  /76   Pulse 53   Temp 97.5  F (36.4  C) (Temporal)   Resp 16   Wt 52.2 kg (115 lb)   SpO2 97%   BMI 21.03 kg/m     Estimated body mass index is 21.03 kg/m  as calculated from the following:    Height as of 9/23/20: 1.575 m (5' 2\").    Weight as of this encounter: 52.2 kg (115 lb).     Neck: no adenopathy. Thyroid normal to palpation. No bruits  Pulm: Lungs clear to auscultation   CV: Regular rates and rhythm  GI: Soft, nontender, Normal active bowel sounds, No hepatosplenomegaly or masses palpable  Ext: Peripheral pulses  Palpable.  No edema.  Neuro: Normal  tone, sensory exam reduced to light touch sensation distal bilateral upper lower extremities.  Handgrip weaker bilaterally.  Back: Nontender to palpation.  Gen: Slightly flattened affect.  Normal dress, eye contact and thought content              "

## 2021-01-06 LAB
ALBUMIN SERPL ELPH-MCNC: 3.9 G/DL (ref 3.7–5.1)
ALPHA1 GLOB SERPL ELPH-MCNC: 0.4 G/DL (ref 0.2–0.4)
ALPHA2 GLOB SERPL ELPH-MCNC: 0.9 G/DL (ref 0.5–0.9)
B-GLOBULIN SERPL ELPH-MCNC: 0.7 G/DL (ref 0.6–1)
GAMMA GLOB SERPL ELPH-MCNC: 1 G/DL (ref 0.7–1.6)
M PROTEIN SERPL ELPH-MCNC: 0 G/DL
PROT PATTERN SERPL ELPH-IMP: NORMAL

## 2021-01-15 ENCOUNTER — HEALTH MAINTENANCE LETTER (OUTPATIENT)
Age: 61
End: 2021-01-15

## 2021-02-09 ENCOUNTER — HOSPITAL ENCOUNTER (OUTPATIENT)
Dept: ULTRASOUND IMAGING | Facility: CLINIC | Age: 61
Discharge: HOME | End: 2021-02-09
Attending: SURGERY | Admitting: SURGERY
Payer: COMMERCIAL

## 2021-02-09 DIAGNOSIS — I73.9 PAD (PERIPHERAL ARTERY DISEASE) (H): ICD-10-CM

## 2021-02-09 PROCEDURE — 93926 LOWER EXTREMITY STUDY: CPT | Mod: RT

## 2021-02-15 DIAGNOSIS — I10 ESSENTIAL HYPERTENSION: ICD-10-CM

## 2021-02-15 RX ORDER — LISINOPRIL 20 MG/1
20 TABLET ORAL 2 TIMES DAILY
Qty: 180 TABLET | Refills: 3 | Status: SHIPPED | OUTPATIENT
Start: 2021-02-15 | End: 2021-07-06

## 2021-02-15 NOTE — PROGRESS NOTES
[place] VASCULAR [facility]    Shirley Hendricks has been followed for multiple vascular issues.  Previous aortobifemoral bypass graft and left femoral to popliteal in situ bypass graft is functioning well.  Worsening claudication symptoms in the right leg due to occluded SFA and underwent a right femora graft limb to above-knee popliteal bypass using a cadaveric SFA graft.  (With groin incision that healed well with no complications.  Excellent graft and DP pulse.    2/9/2021 arterial duplex reveals a widely patent right graft.  Elevated velocity of distal anastomosis to 256 cm/s but no obvious stenosis.  Also no evidence of fluid around the graft with the history of the groin incision problem.    Overall she is doing very well.  Complete resolution of claudication symptoms bilaterally.  She does have occasional mild dependent edema in her right calf level where a low-grade compression stocking.  Is back working as a  for [employer] full-time.    She does have some intermittent tingling of the both of her hands involving all digits.  Occasional headaches.  Etiology is unclear.    PMH: Medications: Norvasc, Coreg, Zestril, Prilosec, Crestor, Plavix,Breo inhaler daily    She unfortunately started smoking again approximately 10 cigarettes daily.    Exam: Alert and appropriate.  Very comfortable.  Walks with no difficulty.              Blood pressure 116/71.  Pulse 75              Well-healed neck and leg incisions.              Minimal edema.               +3 graft and DP pulses bilaterally with excellent Doppler    Impression: #1.  Widely patent right and left leg bypass graft.  Elevated velocity at the distal anastomosis on the right where there is a Ramirez cuff but no obvious stenosis.  Continue on Plavix.  Encouraged to quit smoking.  Plan repeat duplex 3 months.                        #2.  History of carotid enterectomy with normal duplex 9/10/2020.  This will be repeated September 2021.    Chadwick  Dawson Lozano MD    CC Dr. Rei Casiano

## 2021-02-18 ENCOUNTER — OFFICE VISIT (OUTPATIENT)
Dept: OTHER | Facility: CLINIC | Age: 61
End: 2021-02-18
Attending: SURGERY
Payer: COMMERCIAL

## 2021-02-18 VITALS — HEART RATE: 75 BPM | DIASTOLIC BLOOD PRESSURE: 71 MMHG | SYSTOLIC BLOOD PRESSURE: 116 MMHG | RESPIRATION RATE: 16 BRPM

## 2021-02-18 DIAGNOSIS — I73.9 PAD (PERIPHERAL ARTERY DISEASE) (H): Primary | ICD-10-CM

## 2021-02-18 DIAGNOSIS — E78.5 HYPERLIPIDEMIA LDL GOAL <70: ICD-10-CM

## 2021-02-18 DIAGNOSIS — F17.200 SMOKING: ICD-10-CM

## 2021-02-18 PROCEDURE — G0463 HOSPITAL OUTPT CLINIC VISIT: HCPCS

## 2021-02-18 PROCEDURE — 99213 OFFICE O/P EST LOW 20 MIN: CPT | Performed by: SURGERY

## 2021-02-18 NOTE — PROGRESS NOTES
"Shirley Hendricks is a 60 year old female who presents for:  Chief Complaint   Patient presents with     RECHECK     US completed 2/9/2021. History of right femoral graft limb to above knee popliteal bypass with cadaveric SFA 9/23/20; 3 month follow up to 11/19/20 appointment with Dr. Blake Stallingss:    Vitals:    02/18/21 0949   BP: 116/71   BP Location: Left arm   Patient Position: Chair   Cuff Size: Adult Regular   Pulse: 75   Resp: 16       BMI:  Estimated body mass index is 21.03 kg/m  as calculated from the following:    Height as of 9/23/20: 5' 2\" (1.575 m).    Weight as of 1/5/21: 115 lb (52.2 kg).    Pain Score:  Data Unavailable        Santos Garibay [facility]    "

## 2021-03-02 ENCOUNTER — MYC MEDICAL ADVICE (OUTPATIENT)
Dept: INTERNAL MEDICINE | Facility: CLINIC | Age: 61
End: 2021-03-02

## 2021-03-03 NOTE — MR AVS SNAPSHOT
After Visit Summary   9/10/2018    Shirley Hendricks    MRN: [id number]           Patient Information     Date Of Birth          1960        Visit Information        Provider Department      9/10/2018 9:30 AM Vasquez Benoit MD [facility]        Today's Diagnoses     Tobacco use disorder    -  1    Chronic obstructive pulmonary disease, unspecified COPD type (H)        Special screening for malignant neoplasms, colon          Care Instructions     MD will order Prolia and  should hear from clinic in a couple days  Nonfasting labs in November re: Vit C  Pt counselled re: smoking cessation.  Pt to contact the clinic if willing to start prescription treatment  Flu shot in October through [facility] pharmacy  Colonoscopy  with  [facility]. They will call to schedule. If not heard from them in 1 week, then call [phone].  Hold Aspirin and Plavix/Clopidogrel for 1 week prior to the colonoscopy and then restart          Follow-ups after your visit        Additional Services     GASTROENTEROLOGY ADULT REF PROCEDURE ONLY Other; [facility] [phone]                 Who to contact     If you have questions or need follow up information about today's clinic visit or your schedule please contact [facility] directly at [phone].  Normal or non-critical lab and imaging results will be communicated to you by MyChart, letter or phone within 4 business days after the clinic has received the results. If you do not hear from us within 7 days, please contact the clinic through [employer] or phone. If you have a critical or abnormal lab result, we will notify you by phone as soon as possible.  Submit refill requests through [employer] or call your pharmacy and they will forward the refill request to us. Please allow 3 business days for your refill to be completed.          Additional Information About Your Visit        MyChart Information     [employer] gives you  "Rehab Progress Note     Date of Service: 3/2/2021  Chief Complaint: follow up stroke    Interval Events (Subjective)    Patient seen and examined today in her room.  We went over her CT scan as well as her MRI and discussed her stroke prognosis for recovery.  Patient denies any pain.  She has no new complaints.    Objective:  VITAL SIGNS: /61   Pulse 80   Temp 36.4 °C (97.6 °F) (Temporal)   Resp 16   Ht 1.575 m (5' 2\")   Wt 47.1 kg (103 lb 14.4 oz)   SpO2 95%   BMI 19.00 kg/m²   Gen: alert, no apparent distress  Neuro: notable for improving expressive aphasia    Recent Results (from the past 72 hour(s))   SARS-CoV-2, PCR (In-House)    Collection Time: 03/01/21  4:00 AM   Result Value Ref Range    SARS-CoV-2 Source NP Swab     SARS-CoV-2 by PCR NotDetected    Basic Metabolic Panel    Collection Time: 03/02/21  5:51 AM   Result Value Ref Range    Sodium 133 (L) 135 - 145 mmol/L    Potassium 4.9 3.6 - 5.5 mmol/L    Chloride 99 96 - 112 mmol/L    Co2 26 20 - 33 mmol/L    Glucose 94 65 - 99 mg/dL    Bun 17 8 - 22 mg/dL    Creatinine 0.68 0.50 - 1.40 mg/dL    Calcium 9.5 8.5 - 10.5 mg/dL    Anion Gap 8.0 7.0 - 16.0   MAGNESIUM    Collection Time: 03/02/21  5:51 AM   Result Value Ref Range    Magnesium 2.0 1.5 - 2.5 mg/dL   PHOSPHORUS    Collection Time: 03/02/21  5:51 AM   Result Value Ref Range    Phosphorus 3.3 2.5 - 4.5 mg/dL   proBrain Natriuretic Peptide, NT    Collection Time: 03/02/21  5:51 AM   Result Value Ref Range    NT-proBNP 730 (H) 0 - 125 pg/mL   ESTIMATED GFR    Collection Time: 03/02/21  5:51 AM   Result Value Ref Range    GFR If African American >60 >60 mL/min/1.73 m 2    GFR If Non African American >60 >60 mL/min/1.73 m 2       Current Facility-Administered Medications   Medication Frequency   • hydrOXYzine HCl (ATARAX) tablet 50 mg Q6HRS PRN   • melatonin tablet 3 mg HS PRN   • Respiratory Therapy Consult Continuous RT   • Pharmacy Consult Request ...Pain Management Review 1 Each PHARMACY " TO DOSE   • hydrALAZINE (APRESOLINE) tablet 10 mg Q8HRS PRN   • acetaminophen (Tylenol) tablet 650 mg Q4HRS PRN   • lactulose 20 GM/30ML solution 30 mL QDAY PRN   • docusate sodium (ENEMEEZ) enema 283 mg QDAY PRN   • fleet enema 133 mL QDAY PRN   • artificial tears ophthalmic solution 1 Drop PRN   • benzocaine-menthol (CEPACOL) lozenge 1 Lozenge Q2HRS PRN   • mag hydrox-al hydrox-simeth (MAALOX PLUS ES or MYLANTA DS) suspension 20 mL Q2HRS PRN   • ondansetron (ZOFRAN ODT) dispertab 4 mg 4X/DAY PRN    Or   • ondansetron (ZOFRAN) syringe/vial injection 4 mg 4X/DAY PRN   • traZODone (DESYREL) tablet 50 mg QHS PRN   • sodium chloride (OCEAN) 0.65 % nasal spray 2 Spray PRN   • midazolam (VERSED) 5 mg/mL (1 mL vial) PRN   • senna-docusate (PERICOLACE or SENOKOT S) 8.6-50 MG per tablet 2 tablet BID    And   • polyethylene glycol/lytes (MIRALAX) PACKET 1 Packet QDAY PRN    And   • magnesium hydroxide (MILK OF MAGNESIA) suspension 30 mL QDAY PRN    And   • bisacodyl (DULCOLAX) suppository 10 mg QDAY PRN   • atorvastatin (LIPITOR) tablet 80 mg Q EVENING   • apixaban (ELIQUIS) tablet 5 mg BID   • carvedilol (COREG) tablet 3.125 mg BID WITH MEALS   • vitamin D (Ergocalciferol) (DRISDOL) capsule 50,000 Units Q7 DAYS       Orders Placed This Encounter   Procedures   • Diet Order Diet: Level 5 - Minced and Moist (meds - 1:1 float whole with puree.); Liquid level: Level 0 - Thin; Tray Modifications (optional): No Straws, SLP - 1:1 Supervision by Nursing, SLP - Deliver to Nursing Station     Standing Status:   Standing     Number of Occurrences:   1     Order Specific Question:   Diet:     Answer:   Level 5 - Minced and Moist [24]     Comments:   meds - 1:1 float whole with puree.     Order Specific Question:   Liquid level     Answer:   Level 0 - Thin     Order Specific Question:   Tray Modifications (optional)     Answer:   No Straws     Order Specific Question:   Tray Modifications (optional)     Answer:   SLP - 1:1 Supervision  secure access to your electronic health record. If you see a primary care provider, you can also send messages to your care team and make appointments. If you have questions, please call your primary care clinic.  If you do not have a primary care provider, please call [phone] and they will assist you.        Care EveryWhere ID     This is your Care EveryWhere ID. This could be used by other organizations to access your [facility] medical records  [id number]        Your Vitals Were     Pulse Temperature Respirations Pulse Oximetry BMI (Body Mass Index)       54 97.7  F (36.5  C) 16 95% 22.86 kg/m2        Blood Pressure from Last 3 Encounters:   09/10/18 126/66   08/23/18 144/76   08/07/18 142/84    Weight from Last 3 Encounters:   09/10/18 123 lb (55.8 kg)   08/07/18 123 lb (55.8 kg)   07/30/18 122 lb 8 oz (55.6 kg)              We Performed the Following     GASTROENTEROLOGY ADULT REF PROCEDURE ONLY Other; [facility] [phone]        Primary Care Provider Office Phone # Fax #    Vasquez Benoit [phone]       [address] [facility] [address]        Equal Access to Services     JAEL CRUMP : Hadii aad ku hadasho Soomaali, waaxda luqadaha, qaybta kaalmada adeegyada, waxay ebonyin haydionen carla cerda. So [facility] [phone].    ATENCIÓN: Si habla español, tiene a mahmood disposición servicios gratuitos de asistencia lingüística. Llame al [phone].    We comply with applicable federal civil rights laws and [place] laws. We do not discriminate on the basis of race, color, national origin, age, disability, sex, sexual orientation, or gender identity.            Thank you!     Thank you for choosing [facility]  for your care. Our goal is always to provide you with excellent care. Hearing back from our patients is one way we can continue to improve our services. Please take a few minutes to complete the written survey that you may receive in the mail after your  by Nursing     Order Specific Question:   Tray Modifications (optional)     Answer:   SLP - Deliver to Nursing Station       Assessment:  Active Hospital Problems    Diagnosis    • *CVA (cerebral vascular accident) (HCC)    • Crohn disease (HCC)    • Vitamin D deficiency    • Hypotension    • Hyponatremia    • Hepatocellular dysfunction    • Other hyperlipidemia    • Acute systolic CHF (congestive heart failure) (HCC)    • Methamphetamine abuse (HCC)    • Tobacco dependence      This patient is a 60 y.o. female admitted for acute inpatient rehabilitation with CVA (cerebral vascular accident) (HCC).    We will have her first weekly conference on Wednesday to discuss a discharge date.    Medical Decision Making and Plan:    Left MCA M1 occlusion  S/p tPA  S/p thrombectomy  Dyphagia, improved  Aphasia, expressive more than receptive, improved  Continue full rehab program  PT/OT/SLP, 1 hr each discipline, 5 days per week    Continue apixaban and atorvastatin for secondary stroke prophylaxis    [facility]patient [facility] and Dr. Cornelius    Anxiety/depression  Consult psychology if needed  Mood currently stable     Meth use  Patient denies, but +UDS     Tobacco use  Will need to provide counseling to patient and SO     Crohn's disease  Unclear if she has pain/diarrhea/constipation  Did have recent ED visit for GI complaints, left AMA  No current issues with her bowel     Pleural effusions  Discontinued Lasix due to hypotension  Chest x-ray with small left pleural effusion     Portal hypertension  Ascites  Likely from cirrhosis/hepatocellular dysfunction based upon scan  Monitor for fluid overload     Thyroid nodules  Normal TSH  Outpatient monitoring     Renal mass  Stable  Outpatient monitoring     Acute systolic CHF, EF 25%  Cardiomyopathy  Suspect from drug use  Lasix discontinued due to hypotension  Coreg  Hospitalist consult, appreciate assistance  Lisinopril discontinued due to  visit with us. Thank you!             Your Updated Medication List - Protect others around you: Learn how to safely use, store and throw away your medicines at www.disposemymeds.org.          This list is accurate as of 9/10/18 10:07 AM.  Always use your most recent med list.                   Brand Name Dispense Instructions for use Diagnosis    albuterol 108 (90 Base) MCG/ACT inhaler    PROAIR HFA/PROVENTIL HFA/VENTOLIN HFA    1 Inhaler    Inhale 1-2 puffs into the lungs every 4 hours as needed for shortness of breath / dyspnea or wheezing    COPD exacerbation (H)       ascorbic acid 500 MG tablet    VITAMIN C     1 tab twice a day    Vitamin C deficiency       ASPIRIN EC PO      Take 81 mg by mouth daily        atorvastatin 80 MG tablet    LIPITOR    90 tablet    TAKE ONE TABLET (80MG) BY MOUTH EVERY EVENING INDICATION:TO LOWER CHOLESTEROL AND TO HELP REDUCE RISK OF REOCURRENCE OF HEAR ATTACK STROKE,A    Hyperlipidemia LDL goal <70       clopidogrel 75 MG tablet    PLAVIX    90 tablet    Take 1 tablet (75 mg) by mouth daily    Peripheral vascular disease (H)       denosumab 60 MG/ML Soln injection    PROLIA    1 Syringe    Inject 1 mL (60 mg) Subcutaneous every 6 months INDICATION: TO TREAT OSTEOPOROSIS    Osteoporosis without current pathological fracture, unspecified osteoporosis type       fluticasone-vilanterol 200-25 MCG/INH inhaler    BREO ELLIPTA    3 Inhaler    Inhale 1 puff into the lungs daily INDICATION: COPD CONTROLLER    Chronic obstructive pulmonary disease, unspecified COPD type (H), Tobacco use disorder       lisinopril 40 MG tablet    PRINIVIL/ZESTRIL    90 tablet    Take 1 tablet (40 mg) by mouth daily INDICATION:TO LOWER BLOOD PRESSURE AND TO PRESERVE KIDNEY FUNCTION    Essential hypertension       metoprolol succinate 25 MG 24 hr tablet    TOPROL-XL    90 tablet    Take 1 tablet (25 mg) by mouth daily    Essential hypertension       nitroGLYcerin 0.4 MG sublingual tablet    NITROSTAT    15  hypotension     Mitral stenosis  Monitor     Hyperlipidemia  Statin     Normocytic anemia  Mild, monitor    Hyponatremia  Discontinued Lasix    Vitamin D deficiency  Continue supplementation    Bowel program  Continue bowel medications  Scheduled Sennakot  PRN Miralax, MOM, bisacodyl suppository  Last BM 3/2    Bladder program  Check PVRs - 38, 26, 17  Bladder scan for no voids  ICP for over 400 cc  Scheduled toileting    DVT prophylaxis  Eliquis    Total time:  26 minutes.  I spent greater than 50% of the time for patient care, counseling, and coordination on this date, including patient face-to face time, unit/floor time with review of records/pertinent lab data and studies, as well as discussing diagnostic evaluation/work up, planned therapeutic interventions, and future disposition of care, as per the interval events/subjective and the assessment and plan as noted above.      Majority of time today spent reviewing CT scan and MRI with patient.    I have performed a physical exam, reviewed and updated ROS, as well as the assessment and plan today 3/2/2021. In review of note from 3/1/2021 there are no new changes except as documented above.          Maryana Flanagan M.D.   Physical Medicine and Rehabilitation         tablet    Place 1 tablet (0.4 mg) under the tongue See Admin Instructions for chest pain    Personal history of MI (myocardial infarction)       omeprazole 20 MG CR capsule    priLOSEC    90 capsule    1 capsule daily as needed (not using daily)    Reflux esophagitis       TYLENOL PO      Take 1,000-1,500 mg by mouth every 6 hours as needed for mild pain or fever

## 2021-03-04 ENCOUNTER — NURSE TRIAGE (OUTPATIENT)
Dept: INTERNAL MEDICINE | Facility: CLINIC | Age: 61
End: 2021-03-04

## 2021-03-04 NOTE — TELEPHONE ENCOUNTER
Pt will go to the ER after work and encouraged pt to go in .Nohemy Thompson RN    Additional Information    Negative: Difficult to awaken or acting confused (e.g., disoriented, slurred speech)    Negative: New neurologic deficit that is present NOW, sudden onset of ANY of the following: * Weakness of the face, arm, or leg on one side of the body * Numbness of the face, arm, or leg on one side of the body * Loss of speech or garbled speech    Negative: Sounds like a life-threatening emergency to the triager    Negative: Confusion, disorientation, or hallucinations is the main symptom    Negative: Dizziness is the main symptom    Negative: Followed a head injury within last 3 days    Negative: Headache (with neurologic deficit)    Negative: Unable to urinate (or only a few drops) and bladder feels very full    Negative: Loss of control of bowel or bladder (i.e., incontinence) of new onset    Negative: Back pain with numbness (loss of sensation) in groin or rectal area    Negative: Patient sounds very sick or weak to the triager    Negative: Neurologic deficit that was brief (now gone), ANY of the following: * Weakness of the face, arm, or leg on one side of the body * Numbness of the face, arm, or leg on one side of the body * Loss of speech or garbled speech    Negative: Neurologic deficit of gradual onset, ANY of the following: * Weakness of the face, arm, or leg on one side of the body * Numbness of the face, arm, or leg on one side of the body * Loss of speech or garbled speech    Negative: Mentor palsy suspected (i.e., weakness only one side of the face, developing over hours to days, no other symptoms)    Negative: Tingling (e.g., pins and needles) of the face, arm or leg on one side of the body, that is  present now    Negative: Neck pain (with neurologic deficit)    Negative: Back pain (with neurologic deficit)    Negative: Patient wants to be seen    Negative: Loss of speech or garbled speech is a  chronic symptom (recurrent or ongoing problem lasting > 4 weeks)    Negative: Weakness of arm or leg is a chronic symptom (recurrent or ongoing problem lasting > 4 weeks)    Numbness or tingling in one or both hands is a chronic symptom (recurrent or ongoing problem lasting > 4 weeks)    Numbness or tingling in one or both feet is a chronic symptom (recurrent or ongoing problem lasting > 4 weeks)    Protocols used: NEUROLOGIC DEFICIT-A-OH

## 2021-03-15 NOTE — PATIENT INSTRUCTIONS
AFTER VISIT SUMMARY (AVS):    At today's visit we thoroughly discussed current symptoms, necessary evaluation, and the plan, which includes:  Orders Placed This Encounter   Procedures     EMG     Please discuss with your cardiologist need for longer heart monitoring (1 month) to see if episodes of lightheadedness and blurry vision related to heart issues.    No new medications.    Additional recommendations after the work-up.    Next follow-up appointment is in the next 4-6 weeks or earlier if needed.    Please do not hesitate to call me with any questions or concerns.    Thanks.

## 2021-03-15 NOTE — PROGRESS NOTES
ESTABLISHED PATIENT NEUROLOGY NOTE    DATE OF VISIT: 3/16/2021  CLINIC LOCATION: [facility]  MRN: [id number]  PATIENT NAME: Shirley Hendricks  YOB: 1960    PCP: Vasquez Benoit MD    REASON FOR VISIT:   Chief Complaint   Patient presents with     Neurologic Problem     numb hands, light headed, and eyes fluttering     SUBJECTIVE:                                                      HISTORY OF PRESENT ILLNESS: Patient with past medical history of peripheral polyneuropathy and TIA, status post carotid endarterectomy, seen once in February 2020 for dizziness, presents to discuss a new problem, bilateral hand numbness. Please refer to my initial note from 2/13/2020 for further information.    Since the last visit, the patient reports that last November (2020) she developed intermittent fingers/hand numbness and tingling.  It mainly starts at night and continues into the morning.  During the day, it is intermittent.  It affects predominantly the lateral side of her palm and all fingers.  She does not feel it much on the dorsum of her feet.  She also notices symptoms at rest or after warm shower.  Her lower extremity symptoms did not progress.  Denies any associated hand weakness, but could drop things from her hands due to numbness.    In addition, the patient reports continued episodic lightheadedness and intermittent blurry vision/eye fluttering.  She was seen by cardiologist, but did not discuss these symptoms in detail.  Her prior cardiac monitor demonstrated episodes of VT and SVT, but she was symptom-free.  Denies other new focal neurological symptoms.    For these complaints she was previously seen at her primary care office in January 2021.  Additional lab work-up was ordered.  CBC demonstrated low platelet count of 133.  CMP, ESR, protein electrophoresis, TSH (0.77), and vitamin B12 (585) were unremarkable.  Lyme serology was negative.    On review of systems, patient  "endorses no additional active complaints. Medications, allergies, family and social history were also reviewed. There are no changes reported by patient.  REVIEW OF SYSTEMS:                                                    10-system review was completed. Pertinent positives are included in HPI. The remainder of ROS is negative.  EXAM:                                                    Physical Exam:   Vitals: BP 95/59 (Patient Position: Sitting, Cuff Size: Adult Regular)   Pulse 75   Temp 97.9  F (36.6  C) (Oral)   Ht 1.575 m (5' 2\")   Wt 51.9 kg (114 lb 6.4 oz)   SpO2 99%   BMI 20.92 kg/m    Orthostatic vital signs:   Vitals:    03/16/21 0816 03/16/21 0819   BP: (!) 155/78 (!) 154/84   BP Location: Left arm Left arm   Patient Position: Supine Standing   Cuff Size: Adult Regular Adult Regular   Pulse: 50 63   Temp: 97.9  F (36.6  C)    TempSrc: Oral    SpO2: 99%    Weight: 51.9 kg (114 lb 6.4 oz)    Height: 1.575 m (5' 2\")      General: pt is in NAD, cooperative.  Skin: normal turgor, moist mucous membranes, no lesions/rashes noticed.  HEENT: ATNC, white sclera, normal conjunctiva.  Respiratory: Symmetric lung excursion, no accessory respiratory muscle use.  Abdomen: Non distended.  Neurological: awake, cooperative, follows commands, face is symmetric, tongue is midline, strength is preserved in upper and lower extremities, deep tendon reflexes are equal in upper extremities, right patellar reflex is normal, left patellar reflex is diminished, no achilles reflexes, sensation is reduced over the palmar surface of both hands and almost to her knees in the lower extremities, she has bilateral, right worse than left thenar atrophy, high arched feet and hammertoes, no other exam changes.  ASSESSMENT AND PLAN:                                                    Assessment: 60-year-old female patient with suspected hereditary (CMT?) peripheral polyneuropathy presents with complaints of bilateral hand numbness and " weakness as well as continued lightheadedness.  Her orthostatic testing today is negative, though blood pressure remains elevated both times.    We had a detailed discussion with the patient regarding her presenting complaints.  Her neurological exam today is consistent with peripheral polyneuropathy, but she also has bilateral thenar atrophy that make me wonder if she has quite severe median neuropathies at the wrist or cervical radiculopathies on top of it.  I discussed with the patient that her bilateral hand numbness might be due to progression of known peripheral polyneuropathy.  Additional differential includes focal entrapment neuropathies and cervical radiculopathy.  For further diagnostic clarification, I ordered EMG.  Based on results, we will proceed with additional lab work and appropriate imaging.    We reviewed that episodic lightheadedness might be a side effect of her blood pressure medications, but also could be due to intermittent paroxysmal arrhythmia that should be discussed with her cardiologist.    Shirley to follow up with Primary Care provider regarding elevated blood pressure.     Diagnoses:    ICD-10-CM    1. Bilateral hand numbness  R20.0 EMG   2. Idiopathic peripheral neuropathy  G60.9 EMG   3. Lightheadedness  R42      Plan: At today's visit we thoroughly discussed current symptoms, necessary evaluation, and the plan, which includes:  Orders Placed This Encounter   Procedures     EMG     I advised the patient to discuss with her cardiologist need for longer heart monitoring (1 month) to see if episodes of lightheadedness and blurry vision related to paroxysmal arrhythmias.    No new medications.    Additional recommendations after the work-up.    Next follow-up appointment is in the next 4-6 weeks or earlier if needed.    Total Time: 71 minutes spent on the date of the encounter doing chart review, history and exam, documentation and further activities as noted above.    Edy PILLAI  MD Iván  [facility]  (Chart documentation was completed in part with Dragon voice-recognition software. Even though reviewed, some grammatical, spelling, and word errors may remain.)

## 2021-03-16 ENCOUNTER — OFFICE VISIT (OUTPATIENT)
Dept: NEUROLOGY | Facility: CLINIC | Age: 61
End: 2021-03-16
Attending: PSYCHIATRY & NEUROLOGY
Payer: COMMERCIAL

## 2021-03-16 VITALS
DIASTOLIC BLOOD PRESSURE: 59 MMHG | OXYGEN SATURATION: 99 % | HEART RATE: 75 BPM | SYSTOLIC BLOOD PRESSURE: 95 MMHG | BODY MASS INDEX: 21.05 KG/M2 | HEIGHT: 62 IN | WEIGHT: 114.4 LBS | TEMPERATURE: 97.9 F

## 2021-03-16 DIAGNOSIS — G60.9 IDIOPATHIC PERIPHERAL NEUROPATHY: ICD-10-CM

## 2021-03-16 DIAGNOSIS — R20.0 BILATERAL HAND NUMBNESS: Primary | ICD-10-CM

## 2021-03-16 DIAGNOSIS — R42 LIGHTHEADEDNESS: ICD-10-CM

## 2021-03-16 PROCEDURE — 99417 PROLNG OP E/M EACH 15 MIN: CPT | Performed by: PSYCHIATRY & NEUROLOGY

## 2021-03-16 PROCEDURE — G0463 HOSPITAL OUTPT CLINIC VISIT: HCPCS

## 2021-03-16 PROCEDURE — 99215 OFFICE O/P EST HI 40 MIN: CPT | Performed by: PSYCHIATRY & NEUROLOGY

## 2021-03-16 ASSESSMENT — MIFFLIN-ST. JEOR: SCORE: 1032.16

## 2021-03-16 NOTE — LETTER
3/16/2021         RE: Shirley Hendricks  [id number]  [facility] [address]        Dear Colleague,    Thank you for referring your patient, Shirley Hendricks, to the [facility] [place]. Please see a copy of my visit note below.    ESTABLISHED PATIENT NEUROLOGY NOTE    DATE OF VISIT: 3/16/2021  CLINIC LOCATION: [facility]  MRN: [id number]  PATIENT NAME: Shirley Hendricks  YOB: 1960    PCP: Vasquez Benoit MD    REASON FOR VISIT:   Chief Complaint   Patient presents with     Neurologic Problem     numb hands, light headed, and eyes fluttering     SUBJECTIVE:                                                      HISTORY OF PRESENT ILLNESS: Patient with past medical history of peripheral polyneuropathy and TIA, status post carotid endarterectomy, seen once in February 2020 for dizziness, presents to discuss a new problem, bilateral hand numbness. Please refer to my initial note from 2/13/2020 for further information.    Since the last visit, the patient reports that last November (2020) she developed intermittent fingers/hand numbness and tingling.  It mainly starts at night and continues into the morning.  During the day, it is intermittent.  It affects predominantly the lateral side of her palm and all fingers.  She does not feel it much on the dorsum of her feet.  She also notices symptoms at rest or after warm shower.  Her lower extremity symptoms did not progress.  Denies any associated hand weakness, but could drop things from her hands due to numbness.    In addition, the patient reports continued episodic lightheadedness and intermittent blurry vision/eye fluttering.  She was seen by cardiologist, but did not discuss these symptoms in detail.  Her prior cardiac monitor demonstrated episodes of VT and SVT, but she was symptom-free.  Denies other new focal neurological symptoms.    For these complaints she was previously seen at  "her primary care office in January 2021.  Additional lab work-up was ordered.  CBC demonstrated low platelet count of 133.  CMP, ESR, protein electrophoresis, TSH (0.77), and vitamin B12 (585) were unremarkable.  Lyme serology was negative.    On review of systems, patient endorses no additional active complaints. Medications, allergies, family and social history were also reviewed. There are no changes reported by patient.  REVIEW OF SYSTEMS:                                                    10-system review was completed. Pertinent positives are included in HPI. The remainder of ROS is negative.  EXAM:                                                    Physical Exam:   Vitals: BP 95/59 (Patient Position: Sitting, Cuff Size: Adult Regular)   Pulse 75   Temp 97.9  F (36.6  C) (Oral)   Ht 1.575 m (5' 2\")   Wt 51.9 kg (114 lb 6.4 oz)   SpO2 99%   BMI 20.92 kg/m    Orthostatic vital signs:   Vitals:    03/16/21 0816 03/16/21 0819   BP: (!) 155/78 (!) 154/84   BP Location: Left arm Left arm   Patient Position: Supine Standing   Cuff Size: Adult Regular Adult Regular   Pulse: 50 63   Temp: 97.9  F (36.6  C)    TempSrc: Oral    SpO2: 99%    Weight: 51.9 kg (114 lb 6.4 oz)    Height: 1.575 m (5' 2\")      General: pt is in NAD, cooperative.  Skin: normal turgor, moist mucous membranes, no lesions/rashes noticed.  HEENT: ATNC, white sclera, normal conjunctiva.  Respiratory: Symmetric lung excursion, no accessory respiratory muscle use.  Abdomen: Non distended.  Neurological: awake, cooperative, follows commands, face is symmetric, tongue is midline, strength is preserved in upper and lower extremities, deep tendon reflexes are equal in upper extremities, right patellar reflex is normal, left patellar reflex is diminished, no achilles reflexes, sensation is reduced over the palmar surface of both hands and almost to her knees in the lower extremities, she has bilateral, right worse than left thenar atrophy, high arched " feet and hammertoes, no other exam changes.  ASSESSMENT AND PLAN:                                                    Assessment: 60-year-old female patient with suspected hereditary (CMT?) peripheral polyneuropathy presents with complaints of bilateral hand numbness and weakness as well as continued lightheadedness.  Her orthostatic testing today is negative, though blood pressure remains elevated both times.    We had a detailed discussion with the patient regarding her presenting complaints.  Her neurological exam today is consistent with peripheral polyneuropathy, but she also has bilateral thenar atrophy that make me wonder if she has quite severe median neuropathies at the wrist or cervical radiculopathies on top of it.  I discussed with the patient that her bilateral hand numbness might be due to progression of known peripheral polyneuropathy.  Additional differential includes focal entrapment neuropathies and cervical radiculopathy.  For further diagnostic clarification, I ordered EMG.  Based on results, we will proceed with additional lab work and appropriate imaging.    We reviewed that episodic lightheadedness might be a side effect of her blood pressure medications, but also could be due to intermittent paroxysmal arrhythmia that should be discussed with her cardiologist.    Shirley to follow up with Primary Care provider regarding elevated blood pressure.     Diagnoses:    ICD-10-CM    1. Bilateral hand numbness  R20.0 EMG   2. Idiopathic peripheral neuropathy  G60.9 EMG   3. Lightheadedness  R42      Plan: At today's visit we thoroughly discussed current symptoms, necessary evaluation, and the plan, which includes:  Orders Placed This Encounter   Procedures     EMG     I advised the patient to discuss with her cardiologist need for longer heart monitoring (1 month) to see if episodes of lightheadedness and blurry vision related to paroxysmal arrhythmias.    No new medications.    Additional  recommendations after the work-up.    Next follow-up appointment is in the next 4-6 weeks or earlier if needed.    Total Time: 71 minutes spent on the date of the encounter doing chart review, history and exam, documentation and further activities as noted above.    Edy Minor MD  [facility] Neurology  (Chart documentation was completed in part with Dragon voice-recognition software. Even though reviewed, some grammatical, spelling, and word errors may remain.)      Again, thank you for allowing me to participate in the care of your patient.        Sincerely,        Edy Minor MD

## 2021-03-16 NOTE — LETTER
To whom it may concern:      Shirley Hendricks (: 1960) was seen today at my clinic.  She may return to work on 3/17/2021 without restrictions.  Please call my clinic with any additional questions or concerns at the provided above phone number.      Sincerely,          Edy Minor MD.

## 2021-03-23 ENCOUNTER — OFFICE VISIT (OUTPATIENT)
Dept: NEUROLOGY | Facility: CLINIC | Age: 61
End: 2021-03-23
Payer: COMMERCIAL

## 2021-03-23 DIAGNOSIS — G60.0 CHARCOT-MARIE-TOOTH DISEASE OF DEMYELINATING TYPE: Primary | ICD-10-CM

## 2021-03-23 DIAGNOSIS — G56.03 BILATERAL CARPAL TUNNEL SYNDROME: Primary | ICD-10-CM

## 2021-03-23 DIAGNOSIS — G60.0 CHARCOT-MARIE-TOOTH DISEASE: ICD-10-CM

## 2021-03-23 DIAGNOSIS — G56.03 BILATERAL CARPAL TUNNEL SYNDROME: ICD-10-CM

## 2021-03-23 PROCEDURE — 95886 MUSC TEST DONE W/N TEST COMP: CPT | Performed by: PSYCHIATRY & NEUROLOGY

## 2021-03-23 PROCEDURE — 99207 US EXTREMITY NON VASCULAR LEFT: CPT | Performed by: PSYCHIATRY & NEUROLOGY

## 2021-03-23 PROCEDURE — 76882 US LMTD JT/FCL EVL NVASC XTR: CPT | Performed by: PSYCHIATRY & NEUROLOGY

## 2021-03-23 PROCEDURE — 95913 NRV CNDJ TEST 13/> STUDIES: CPT | Performed by: PSYCHIATRY & NEUROLOGY

## 2021-03-23 NOTE — LETTER
3/23/2021       RE: Shirley Hendricks  [address]  [facility] [address]     Dear Colleague,    Thank you for referring your patient, Shirley Hendricks, to the [facility] EMG [facility]IN[facility] [place] at [facility]. Please see a copy of my visit note below.    Neuromuscular Ultrasound Report   Patient: Shirley Hendricks  Patient ID: [id number]  YOB: 1960   Age: 60 years  Referring Physician: Edy Minor MD  History & Examination:   60 year old woman with complaints of bilateral numbness and tingling in hands, worse early in the morning. She reports a flick sign (shaking both hands to get relief from the paresthesias). There is marked atrophy and weakness of intrinsic hand muscles in both sides, as well as high arched feet and hammertoes. EMG today showed a generalized severe demyelinating polyneuropathy as can be seen with CMT. Electrophysiologic diagnosis of carpal tunnel syndrome was not possible in this context. Ultrasonography of bilateral median nerves was requested to rule out superimposed carpal tunnel syndrome.   Techniques:   Ultrasound of the bilateral median nerves from the distal wrist crease to the elbow (antecubital fossa) was performed with a 15 Mhz transducer.   Results:   Bilateral median nerves showed markedly increased cross sectional area (CSA), reduced echogenicity, but rather preserved fascicular architecture at the distal wrist crease, but also 6-10 cm proximally to the wrist, as well as at the elbow (antecubital fossae). CSAs were (right/left): 0.29/0.27 at the wrist, 0.29/0.34 at the antecubital fossae, and 0.26/0.26, 8-10 cm proximally to the wrist (all values in cm2). A milder enlargement of bilateral median nerves was noted at the proximal forearm, about 15 cm proximally to the wrist crease (0,14-0,15 cm2 on both sides). The wrist to forearm CSA ratio was >2.0 bilaterally.Images were stored and are  available for review.   Interpretation:   Abnormal study. There is ultrasonographic evidence of a generalized likely demyelinating polyneuropathy based on marked enlargement and hypoechoic signal of bilateral median nerves at multiple locations between the wrist and the elbow. In addition, the presence of more focal enlargement of both median nerves at the distal wrist crease, compared to proximal forearm (CSA ratio >2:1) suggests the possibility of superimposed bilateral carpal tunnel syndrome. Clinical correlation is recommended.   Ultrasonographer:   Babar Scales MD, FAAN

## 2021-03-23 NOTE — PROGRESS NOTES
[facility]  Electrodiagnostic Laboratory    Nerve Conduction & EMG Report          Patient:       Shirley Hendricks  Patient ID:    [id number]  Gender:        Female  YOB: 1960  Age:           60 Years 5 Months      Referring Physician: Edy Minor MD    History & Examination:    60 year old woman with chief complaint of hand numbness/tingling bilaterally, worse when waking up in the morning, and leading to frequent hand shaking (flick sign). She has high arched feet and hammertoes, and marked atrophy and weakness of all intrinsic hand muscles. Query 1) Generalized hereditary polyneuropathy, like CMT 2) Bilateral carpal tunnel syndrome, 3) Cervical radiculopathies.     Techniques: Motor and sensory conduction studies were done with surface recording electrodes. EMG was done with a concentric needle electrode.     Results:    Bilateral median , ulnar, sural, and the right radial antidromic sensory NCSs showed no responses. Bilateral median motor NCSs recorded from APB, ulnar motor NCSs recorded from ADM, and the right common peroneal motor NCS recorded from TA all showed dramatically prolonged distal latencies, markedly attenuated CMAP amplitudes without major segmental changes, and markedly attenuated conduction velocities. Bilateral median nerves showed more severe distal latency prolongation, amplitude and conduction velocity attenuation than the ulnar nerves. Right deep peroneal motor NCS recorded from EDB and right tibial motor NCS recorded from the AH showed no responses.   Needle EMG showed 2+ to 3+ fibs at bilateral FDI, 2+ at right pronator teres, and some fasciculations at right FDI and EIP. Otherwise, no abnormal spontaneous activity was detected. Severely reduced recruitment of large, long duration MUPs was noted in distal bilateral upper extremity muscles (FDI, EIP), moderately reduced recruitment of large, long duration MUPs was noted at bilateral pronator teres,  and mildly reduced recruitment of occasional large MUPs at the left deltoid. EMG of right deltoid and bilateral triceps was normal.     Interpretation:    Abnormal study. There is electrodiagnostic evidence of a severe, demyelinating sensorimotor polyneuropathy, with secondary axonal loss. Those findings are consistent with hereditary neuropathy such as CMT 1 or CMT X. Genetic testing is recommended. It is impossible to reliably diagnose superimposed bilateral median neuropathies at the wrist (carpal tunnel syndrome) in this context, although it should be noted that the median motor distal latencies at the wrist were much more prolonged than the ulnar ones. Ultrasonography may be helpful in this case. Lastly, while it is hard to exclude superimposed cervical radiculopathies with confidence, overall the most likely explanation for the needle EMG findings outlined above is a motor polyneuropathy like CMT; the neurogenic changes identified show a vvquze-ko-diqswcep gradient, the distal muscles being worse, which would not be typical for radiculopathies. Clinical correlation is recommended.     EMG Physician:    Babar Scales MD         Sensory NCS      Nerve / Sites Rec. Site Onset Peak NP Amp Ref. PP Amp Dist Perfecto Ref. Temp     ms ms  V  V  V cm m/s m/s  C   R MEDIAN - Dig II Anti      Wrist Dig II NR NR NR 10.0 NR 14 NR 48.0 32.1   L MEDIAN - Dig II Anti      Wrist Dig II NR NR NR 10.0 NR 14 NR 48.0 24.2   R ULNAR - Dig V Anti      Wrist Dig V NR NR NR 8.0 NR 12.5 NR 48.0 32.4   L ULNAR - Dig V Anti      Wrist Dig V NR NR NR 8.0 NR 12.5 NR 48.0 32   R RADIAL - Snuff      Forearm Snuff NR NR NR 15.0 NR 12.5 NR 48.0 32   R SURAL - Lat Mall 60      Calf Ankle NR NR NR 5.0 NR 14 NR 38.0 31.5   L SURAL - Lat Mall 60      Calf Ankle NR NR NR 5.0 NR 14 NR 38.0 31.4       Motor NCS      Nerve / Sites Rec. Site Lat Ref. Amp Ref. Rel Amp Dist Perfecto Ref. Dur. Area Temp.     ms ms mV mV % cm m/s m/s ms %  C   R MEDIAN - APB       Wrist APB 34.64 4.40 0.2 5.0 100 8   7.45 100 32      Elbow APB 63.70  0.2  79.1 20 6.9 48.0 10.21  32   L MEDIAN - APB      Wrist APB 26.93 4.40 0.3 5.0 100 8   25.31 100 32      Elbow APB 35.47  0.3  94.4 18 21.1 48.0 40.89  32.1   R ULNAR - ADM      Wrist ADM 15.57 3.50 2.8 5.0 100 8   11.51 100 32      B.Elbow ADM 23.75  2.7  96.6 17 20.8 48.0 12.55 97.5 32      A.Elbow ADM 30.73  1.9  69.8 10 14.3 48.0 16.30 75.7 32      Axilla ADM 34.27  1.8  65.6 8 22.6 48.0 13.96 68 32   L ULNAR - ADM      Wrist ADM 20.52 3.50 2.6 5.0 100 8   14.53 100 32      B.Elbow ADM 31.09  1.9  72.7 15 14.2 48.0 15.21 81.3 32.1      A.Elbow ADM 40.99  1.1  41.2 10 10.1 48.0 9.58 24.2 32.1   R DEEP PERONEAL - EDB 60      Ankle EDB NR 6.00 NR 2.0 NR 8   NR NR 31.4   R TIBIAL - AH      Ankle AH NR 6.00 NR 4.0 NR 8   NR NR 23.8   R PERONEAL - Tib Ant      Fib Head Tib Ant 11.46  1.9  100    13.91 100 31.4      Knee Tib Ant 19.58  1.1  59.4 10 12.3  13.18 66.3 31.4       EMG Summary Table     Spontaneous Recruitment MUAP    IA Fib PSW Fasc H.F. Pattern Amp Dur. PPP   L. FIRST D INTEROSS N 2+ None 1+ None Sev Reduced 2+ 2+ N   L. EXT INDICIS  None None None None Mod-Sev Reduced 2+ 2+ N   L. TRICEPS N None None None None N N N N   L. DELTOID N None None None None Mild Reduced 1+ N N   L. PRON TERES N None None None None Mod Reduced 1+ 1+ N   R. FIRST D INTEROSS N 3+ None None None Sev Reduced 2+ 2+ N   R. EXT INDICIS N None None 2+ (slow) None Sev Reduced 2+ 2+ N   R. TRICEPS N None None None None N N N N   R. DELTOID N None None None None N N N N   R. PRON TERES N None 2+ None None Mod Reduced 1+ 2+ N

## 2021-03-23 NOTE — LETTER
3/23/2021       RE: Shirley Hendricks  [address]  [facility] [address]     Dear Colleague,    Thank you for referring your patient, Shirley Hendricks, to the [facility] EMG CLIN[facility] [place] at [facility]. Please see a copy of my visit note below.        [facility]  Electrodiagnostic Laboratory    Nerve Conduction & EMG Report      Patient:       Shirley Hendricks  Patient ID:    [id number]  Gender:        Female  YOB: 1960  Age:           60 Years 5 Months      Referring Physician: Edy Minor MD    History & Examination:    60 year old woman with chief complaint of hand numbness/tingling bilaterally, worse when waking up in the morning, and leading to frequent hand shaking (flick sign). She has high arched feet and hammertoes, and marked atrophy and weakness of all intrinsic hand muscles. Query 1) Generalized hereditary polyneuropathy, like CMT 2) Bilateral carpal tunnel syndrome, 3) Cervical radiculopathies.     Techniques: Motor and sensory conduction studies were done with surface recording electrodes. EMG was done with a concentric needle electrode.     Results:    Bilateral median , ulnar, sural, and the right radial antidromic sensory NCSs showed no responses. Bilateral median motor NCSs recorded from APB, ulnar motor NCSs recorded from ADM, and the right common peroneal motor NCS recorded from TA all showed dramatically prolonged distal latencies, markedly attenuated CMAP amplitudes without major segmental changes, and markedly attenuated conduction velocities. Bilateral median nerves showed more severe distal latency prolongation, amplitude and conduction velocity attenuation than the ulnar nerves. Right deep peroneal motor NCS recorded from EDB and right tibial motor NCS recorded from the AH showed no responses.   Needle EMG showed 2+ to 3+ fibs at bilateral FDI, 2+ at right pronator teres, and  some fasciculations at right FDI and EIP. Otherwise, no abnormal spontaneous activity was detected. Severely reduced recruitment of large, long duration MUPs was noted in distal bilateral upper extremity muscles (FDI, EIP), moderately reduced recruitment of large, long duration MUPs was noted at bilateral pronator teres, and mildly reduced recruitment of occasional large MUPs at the left deltoid. EMG of right deltoid and bilateral triceps was normal.     Interpretation:    Abnormal study. There is electrodiagnostic evidence of a severe, demyelinating sensorimotor polyneuropathy, with secondary axonal loss. Those findings are consistent with hereditary neuropathy such as CMT 1 or CMT X. Genetic testing is recommended. It is impossible to reliably diagnose superimposed bilateral median neuropathies at the wrist (carpal tunnel syndrome) in this context, although it should be noted that the median motor distal latencies at the wrist were much more prolonged than the ulnar ones. Ultrasonography may be helpful in this case. Lastly, while it is hard to exclude superimposed cervical radiculopathies with confidence, overall the most likely explanation for the needle EMG findings outlined above is a motor polyneuropathy like CMT; the neurogenic changes identified show a eppcuc-ue-nmakkbst gradient, the distal muscles being worse, which would not be typical for radiculopathies. Clinical correlation is recommended.     EMG Physician:    Babar Scales MD         Sensory NCS      Nerve / Sites Rec. Site Onset Peak NP Amp Ref. PP Amp Dist Perfecto Ref. Temp     ms ms  V  V  V cm m/s m/s  C   R MEDIAN - Dig II Anti      Wrist Dig II NR NR NR 10.0 NR 14 NR 48.0 32.1   L MEDIAN - Dig II Anti      Wrist Dig II NR NR NR 10.0 NR 14 NR 48.0 24.2   R ULNAR - Dig V Anti      Wrist Dig V NR NR NR 8.0 NR 12.5 NR 48.0 32.4   L ULNAR - Dig V Anti      Wrist Dig V NR NR NR 8.0 NR 12.5 NR 48.0 32   R RADIAL - Snuff      Forearm Snuff NR NR NR  15.0 NR 12.5 NR 48.0 32   R SURAL - Lat Mall 60      Calf Ankle NR NR NR 5.0 NR 14 NR 38.0 31.5   L SURAL - Lat Mall 60      Calf Ankle NR NR NR 5.0 NR 14 NR 38.0 31.4       Motor NCS      Nerve / Sites Rec. Site Lat Ref. Amp Ref. Rel Amp Dist Perfecto Ref. Dur. Area Temp.     ms ms mV mV % cm m/s m/s ms %  C   R MEDIAN - APB      Wrist APB 34.64 4.40 0.2 5.0 100 8   7.45 100 32      Elbow APB 63.70  0.2  79.1 20 6.9 48.0 10.21  32   L MEDIAN - APB      Wrist APB 26.93 4.40 0.3 5.0 100 8   25.31 100 32      Elbow APB 35.47  0.3  94.4 18 21.1 48.0 40.89  32.1   R ULNAR - ADM      Wrist ADM 15.57 3.50 2.8 5.0 100 8   11.51 100 32      B.Elbow ADM 23.75  2.7  96.6 17 20.8 48.0 12.55 97.5 32      A.Elbow ADM 30.73  1.9  69.8 10 14.3 48.0 16.30 75.7 32      Axilla ADM 34.27  1.8  65.6 8 22.6 48.0 13.96 68 32   L ULNAR - ADM      Wrist ADM 20.52 3.50 2.6 5.0 100 8   14.53 100 32      B.Elbow ADM 31.09  1.9  72.7 15 14.2 48.0 15.21 81.3 32.1      A.Elbow ADM 40.99  1.1  41.2 10 10.1 48.0 9.58 24.2 32.1   R DEEP PERONEAL - EDB 60      Ankle EDB NR 6.00 NR 2.0 NR 8   NR NR 31.4   R TIBIAL - AH      Ankle AH NR 6.00 NR 4.0 NR 8   NR NR 23.8   R PERONEAL - Tib Ant      Fib Head Tib Ant 11.46  1.9  100    13.91 100 31.4      Knee Tib Ant 19.58  1.1  59.4 10 12.3  13.18 66.3 31.4       EMG Summary Table     Spontaneous Recruitment MUAP    IA Fib PSW Fasc H.F. Pattern Amp Dur. PPP   L. FIRST D INTEROSS N 2+ None 1+ None Sev Reduced 2+ 2+ N   L. EXT INDICIS  None None None None Mod-Sev Reduced 2+ 2+ N   L. TRICEPS N None None None None N N N N   L. DELTOID N None None None None Mild Reduced 1+ N N   L. PRON TERES N None None None None Mod Reduced 1+ 1+ N   R. FIRST D INTEROSS N 3+ None None None Sev Reduced 2+ 2+ N   R. EXT INDICIS N None None 2+ (slow) None Sev Reduced 2+ 2+ N   R. TRICEPS N None None None None N N N N   R. DELTOID N None None None None N N N N   R. PRON TERES N None 2+ None None Mod Reduced 1+ 2+ N                                         Again, thank you for allowing me to participate in the care of your patient.      Sincerely,    Babar Scales MD

## 2021-03-23 NOTE — PROGRESS NOTES
Neuromuscular Ultrasound Report   Patient: Shirley Hendricks  Patient ID: [id number]  YOB: 1960   Age: 60 years  Referring Physician: Edy Minor MD    History & Examination:   60 year old woman with complaints of bilateral numbness and tingling in hands, worse early in the morning. She reports a flick sign (shaking both hands to get relief from the paresthesias). There is marked atrophy and weakness of intrinsic hand muscles in both sides, as well as high arched feet and hammertoes. EMG today showed a generalized severe demyelinating polyneuropathy as can be seen with CMT. Electrophysiologic diagnosis of carpal tunnel syndrome was not possible in this context. Ultrasonography of bilateral median nerves was requested to rule out superimposed carpal tunnel syndrome.   Techniques:   Ultrasound of the bilateral median nerves from the distal wrist crease to the elbow (antecubital fossa) was performed with a 15 Mhz transducer.   Results:   Bilateral median nerves showed markedly increased cross sectional area (CSA), reduced echogenicity, but rather preserved fascicular architecture at the distal wrist crease, but also 6-10 cm proximally to the wrist, as well as at the elbow (antecubital fossae). CSAs were (right/left): 0.29/0.27 at the wrist, 0.29/0.34 at the antecubital fossae, and 0.26/0.26, 8-10 cm proximally to the wrist (all values in cm2). A milder enlargement of bilateral median nerves was noted at the proximal forearm, about 15 cm proximally to the wrist crease (0,14-0,15 cm2 on both sides). The wrist to forearm CSA ratio was >2.0 bilaterally.Images were stored and are available for review.   Interpretation:   Abnormal study. There is ultrasonographic evidence of a generalized likely demyelinating polyneuropathy based on marked enlargement and hypoechoic signal of bilateral median nerves at multiple locations between the wrist and the elbow. In addition, the presence of more focal  enlargement of both median nerves at the distal wrist crease, compared to proximal forearm (CSA ratio >2:1) suggests the possibility of superimposed bilateral carpal tunnel syndrome. Clinical correlation is recommended.   Ultrasonographer:   Babar Scales MD, FAAN

## 2021-03-24 ENCOUNTER — TELEPHONE (OUTPATIENT)
Dept: NEUROLOGY | Facility: CLINIC | Age: 61
End: 2021-03-24

## 2021-03-24 NOTE — TELEPHONE ENCOUNTER
Left a VMM for Shirley, instructing her to call the clinic to schedule a new neuropathy appointment with Dr. Scales.  Per Dr. Scales, appointment can be virtual.    Eleni Bennett RN

## 2021-03-25 ENCOUNTER — TELEPHONE (OUTPATIENT)
Dept: NEUROLOGY | Facility: CLINIC | Age: 61
End: 2021-03-25

## 2021-03-25 NOTE — TELEPHONE ENCOUNTER
Per Dr Minor, moved appointment up to 04/08/2021 at 9 AM    Sd Mojica, CMA on 3/25/2021 at 12:25 PM

## 2021-04-03 DIAGNOSIS — I10 ESSENTIAL HYPERTENSION: ICD-10-CM

## 2021-04-03 DIAGNOSIS — I73.9 PERIPHERAL VASCULAR DISEASE (H): ICD-10-CM

## 2021-04-04 RX ORDER — CARVEDILOL 6.25 MG/1
TABLET ORAL
Qty: 180 TABLET | Refills: 3 | Status: SHIPPED | OUTPATIENT
Start: 2021-04-04 | End: 2021-07-06

## 2021-04-04 RX ORDER — CLOPIDOGREL BISULFATE 75 MG/1
75 TABLET ORAL DAILY
Qty: 90 TABLET | Refills: 3 | Status: SHIPPED | OUTPATIENT
Start: 2021-04-04 | End: 2021-10-20

## 2021-04-05 ENCOUNTER — OFFICE VISIT (OUTPATIENT)
Dept: NEUROLOGY | Facility: CLINIC | Age: 61
End: 2021-04-05
Attending: PSYCHIATRY & NEUROLOGY
Payer: COMMERCIAL

## 2021-04-05 VITALS
BODY MASS INDEX: 20.13 KG/M2 | WEIGHT: 109.4 LBS | SYSTOLIC BLOOD PRESSURE: 118 MMHG | TEMPERATURE: 97.7 F | HEART RATE: 64 BPM | DIASTOLIC BLOOD PRESSURE: 76 MMHG | HEIGHT: 62 IN | OXYGEN SATURATION: 99 %

## 2021-04-05 DIAGNOSIS — G60.0 CHARCOT-MARIE-TOOTH DISEASE OF DEMYELINATING TYPE: ICD-10-CM

## 2021-04-05 DIAGNOSIS — G56.03 BILATERAL CARPAL TUNNEL SYNDROME: Primary | ICD-10-CM

## 2021-04-05 PROCEDURE — G0463 HOSPITAL OUTPT CLINIC VISIT: HCPCS

## 2021-04-05 PROCEDURE — 99214 OFFICE O/P EST MOD 30 MIN: CPT | Performed by: PSYCHIATRY & NEUROLOGY

## 2021-04-05 ASSESSMENT — MIFFLIN-ST. JEOR: SCORE: 1009.49

## 2021-04-05 NOTE — TELEPHONE ENCOUNTER
Most recent blood pressure at goal.  History of PAD and needing long-term treatment with both medications

## 2021-04-05 NOTE — PATIENT INSTRUCTIONS
AFTER VISIT SUMMARY (AVS):    At today's visit we thoroughly discussed current symptoms, EMG results, additionally needed evaluation, and the plan.     I prescribed you bilateral wrist splints to wear every night for the next 2 to 3 months.  We discussed other treatment options, including gabapentin use and carpal tunnel release surgery if these are not effective.    Next follow-up appointment is after the work-up (based on neuromuscular consult).    Please do not hesitate to call me with any questions or concerns.    Thanks.

## 2021-04-05 NOTE — TELEPHONE ENCOUNTER
RECORDS RECEIVED FROM: Internal   Date of Appt: 4/7/21   NOTES (FOR ALL VISITS) STATUS DETAILS   OFFICE NOTE from referring provider Internal Dr Minor @  [facility] [place]:  4/5/21  3/16/21   OFFICE NOTE from other specialist N/A    DISCHARGE SUMMARY from hospital N/A    DISCHARGE REPORT from the ER N/A    OPERATIVE REPORT N/A    MEDICATION LIST Internal    IMAGING  (FOR ALL VISITS)     EMG Internal [facility]:  3/23/21   EEG N/A    LUMBAR PUNCTURE N/A    OBDULIA SCAN N/A    ULTRASOUND (CAROTID BILAT) *VASCULAR* Internal [facility]:  Neuromuscular Ultrasound Report 3/23/21    [facility]:  US Lower Extrem 2/9/21  US Lower Extrem 11/11/20  US Lower Extrem 6/3/20   MRI (HEAD, NECK, SPINE) Internal [facility]:  MRA Neck Carotids 9/18/19  MRI Brain 9/18/19  MRA Brain COW 9/18/19   CT (HEAD, NECK, SPINE) Internal [facility]:  CT Thoracic Spine 2/26/20  CT Lumbar Spine 2/26/20  CT Head 2/26/20  CT Cervical Spine 2/26/20

## 2021-04-05 NOTE — LETTER
4/5/2021         RE: Shirley Hendricks  [address]  [facility] [address]        Dear Colleague,    Thank you for referring your patient, Shirley Hendricks, to the [facility] [place]. Please see a copy of my visit note below.    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  The last visit was on 3/16/2021.  Please refer to my initial/other prior notes for further information.  The patient is accompanied by her daughter, who participates in the interview today.    Since the last visit, the patient reports that her symptoms are unchanged.  Denies interval development of new focal neurological symptoms.    Her EMG from 3/23/2021 demonstrated evidence of severe, demyelinating sensorimotor polyneuropathy with secondary axonal loss felt consistent with hereditary neuropathy, such as CMT 1 or CMT X (genetic testing recommended).  It felt that it is impossible to reliably diagnose superimposed bilateral median neuropathies at the wrist in this context, though median motor distal latencies at the wrist were much more prolonged than ulnar.  Tabulated data from EMG report were personally reviewed and independently interpreted.    Neuromuscular ultrasound from the same day demonstrated evidence of generalized, likely demyelinating polyneuropathy with additional focal more pronounced enlargement of both median nerves at the distal wrist crease, suggestive of  "possibility of superimposed bilateral carpal tunnel syndrome.    On review of systems, patient endorses continued eye fluttering and lightheadedness (discussed before), but no additional active complaints. Medications, allergies, family and social history were also reviewed. There are no changes reported by patient.  REVIEW OF SYSTEMS:                                                    10-system review was completed. Pertinent positives are included in HPI. The remainder of ROS is negative.  EXAM:                                                    Physical Exam:   Vitals: /76 (BP Location: Left arm, Patient Position: Standing, Cuff Size: Adult Regular)   Pulse 64   Temp 97.7  F (36.5  C) (Oral)   Ht 1.575 m (5' 2\")   Wt 49.6 kg (109 lb 6.4 oz)   SpO2 99%   BMI 20.01 kg/m    Orthostatic vital signs:   Vitals:    04/05/21 0904 04/05/21 0909   BP: 136/78 118/76   BP Location: Left arm Left arm   Patient Position: Supine Standing   Cuff Size: Adult Regular Adult Regular   Pulse: 58 64   Temp: 97.7  F (36.5  C)    TempSrc: Oral    SpO2: 99%    Weight: 49.6 kg (109 lb 6.4 oz)    Height: 1.575 m (5' 2\")      General: pt is in NAD, cooperative.  Skin: normal turgor, moist mucous membranes, no lesions/rashes noticed.  HEENT: ATNC, white sclera, normal conjunctiva.  Respiratory: Symmetric lung excursion, no accessory respiratory muscle use.  Abdomen: Non distended.  Neurological: awake, cooperative, follows commands, no exam changes compared to prior visit.  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  We " discussed these findings in detail with the patient and her daughter along with available treatment options for median neuropathies.  For her hereditary polyneuropathy, I suggested to see a neuromuscular specialist for the necessary genetic work-up.  For bilateral carpal tunnel syndrome I prescribed bilateral wrist splints.  We also reviewed the use of gabapentin, steroid injection, and carpal tunnel release surgeries.    Diagnoses:    ICD-10-CM    1. Bilateral carpal tunnel syndrome  G56.03    2. 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    I placed a referral to see a neuromuscular specialist (Dr. Scales).     I prescribed her bilateral wrist splints to wear every night for the next 2 to 3 months.  We discussed other treatment options, including gabapentin use and carpal tunnel release surgery if these are not effective.    Next follow-up appointment is after the work-up (based on neuromuscular consult).    Total Time: 32 minutes spent on the date of the encounter doing chart review, history and exam, documentation and further activities per the note.    Edy Minor MD  [facility]  (Chart documentation was completed in part with Dragon voice-recognition software. Even though reviewed, some grammatical, spelling, and word errors may remain.)      Again, thank you for allowing me to participate in the care of your patient.        Sincerely,        Edy Minor MD

## 2021-04-05 NOTE — PROGRESS NOTES
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  The last visit was on 3/16/2021.  Please refer to my initial/other prior notes for further information.  The patient is accompanied by her daughter, who participates in the interview today.    Since the last visit, the patient reports that her symptoms are unchanged.  Denies interval development of new focal neurological symptoms.    Her EMG from 3/23/2021 demonstrated evidence of severe, demyelinating sensorimotor polyneuropathy with secondary axonal loss felt consistent with hereditary neuropathy, such as CMT 1 or CMT X (genetic testing recommended).  It felt that it is impossible to reliably diagnose superimposed bilateral median neuropathies at the wrist in this context, though median motor distal latencies at the wrist were much more prolonged than ulnar.  Tabulated data from EMG report were personally reviewed and independently interpreted.    Neuromuscular ultrasound from the same day demonstrated evidence of generalized, likely demyelinating polyneuropathy with additional focal more pronounced enlargement of both median nerves at the distal wrist crease, suggestive of possibility of superimposed bilateral carpal tunnel syndrome.    On review of systems, patient endorses continued eye fluttering and lightheadedness (discussed before), but no additional active complaints. Medications, allergies, family and social history were also reviewed.  "There are no changes reported by patient.  REVIEW OF SYSTEMS:                                                    10-system review was completed. Pertinent positives are included in HPI. The remainder of ROS is negative.  EXAM:                                                    Physical Exam:   Vitals: /76 (BP Location: Left arm, Patient Position: Standing, Cuff Size: Adult Regular)   Pulse 64   Temp 97.7  F (36.5  C) (Oral)   Ht 1.575 m (5' 2\")   Wt 49.6 kg (109 lb 6.4 oz)   SpO2 99%   BMI 20.01 kg/m    Orthostatic vital signs:   Vitals:    04/05/21 0904 04/05/21 0909   BP: 136/78 118/76   BP Location: Left arm Left arm   Patient Position: Supine Standing   Cuff Size: Adult Regular Adult Regular   Pulse: 58 64   Temp: 97.7  F (36.5  C)    TempSrc: Oral    SpO2: 99%    Weight: 49.6 kg (109 lb 6.4 oz)    Height: 1.575 m (5' 2\")      General: pt is in NAD, cooperative.  Skin: normal turgor, moist mucous membranes, no lesions/rashes noticed.  HEENT: ATNC, white sclera, normal conjunctiva.  Respiratory: Symmetric lung excursion, no accessory respiratory muscle use.  Abdomen: Non distended.  Neurological: awake, cooperative, follows commands, no exam changes compared to prior visit.  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  We discussed these findings in detail with the patient and her daughter along with available treatment options for median neuropathies.  For her hereditary polyneuropathy, I suggested to see a neuromuscular specialist for the necessary genetic work-up.  For bilateral carpal " tunnel syndrome I prescribed bilateral wrist splints.  We also reviewed the use of gabapentin, steroid injection, and carpal tunnel release surgeries.    Diagnoses:    ICD-10-CM    1. Bilateral carpal tunnel syndrome  G56.03    2. 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    I placed a referral to see a neuromuscular specialist (Dr. Scales).     I prescribed her bilateral wrist splints to wear every night for the next 2 to 3 months.  We discussed other treatment options, including gabapentin use and carpal tunnel release surgery if these are not effective.    Next follow-up appointment is after the work-up (based on neuromuscular consult).    Total Time: 32 minutes spent on the date of the encounter doing chart review, history and exam, documentation and further activities per the note.    Edy Minor MD  [facility]  (Chart documentation was completed in part with Dragon voice-recognition software. Even though reviewed, some grammatical, spelling, and word errors may remain.)

## 2021-04-07 ENCOUNTER — VIRTUAL VISIT (OUTPATIENT)
Dept: NEUROLOGY | Facility: CLINIC | Age: 61
End: 2021-04-07
Payer: COMMERCIAL

## 2021-04-07 ENCOUNTER — PRE VISIT (OUTPATIENT)
Dept: NEUROLOGY | Facility: CLINIC | Age: 61
End: 2021-04-07

## 2021-04-07 DIAGNOSIS — M54.50 CHRONIC BILATERAL LOW BACK PAIN WITHOUT SCIATICA: ICD-10-CM

## 2021-04-07 DIAGNOSIS — G89.29 CHRONIC BILATERAL LOW BACK PAIN WITHOUT SCIATICA: ICD-10-CM

## 2021-04-07 DIAGNOSIS — M54.2 CHRONIC NECK PAIN: Primary | ICD-10-CM

## 2021-04-07 DIAGNOSIS — G89.29 CHRONIC NECK PAIN: Primary | ICD-10-CM

## 2021-04-07 DIAGNOSIS — G56.03 BILATERAL CARPAL TUNNEL SYNDROME: ICD-10-CM

## 2021-04-07 DIAGNOSIS — G60.0 CHARCOT MARIE TOOTH MUSCULAR ATROPHY: ICD-10-CM

## 2021-04-07 PROCEDURE — 99215 OFFICE O/P EST HI 40 MIN: CPT | Mod: GT | Performed by: PSYCHIATRY & NEUROLOGY

## 2021-04-07 NOTE — PROGRESS NOTES
Service Date: 2021      Edy Minor MD   [facility]   [address] [place] [address]      RE: Shirley Hendricks   MRN: [id number]   : 1960      Dear Edy:      I had the pleasure to evaluate Shirley Hendricks via billable video visit today.  Thank you for this referral. As you know, she is a very pleasant 60-year-old woman who was referred to me for an EMG on  with chief complaint of bilateral hand pain and paresthesias for about 6 months.  She is having tingling and numbness in her hands and pain, triggered by activities including repetitive wrist motion or flexion.  The symptoms are worse when she wakes up in the morning, although she does not describe a typical flick sign.  She is a  and they tend to get aggravated a bit when driving.  On top of this, however, you observed that she had profound thenar AND hypothenar muscle atrophy, high-arched feet and hammertoes and you requested a more extensive investigation to rule out neuropathy.  Indeed, I was impressed by pes cavus and distal foot weakness, and her EMG study showed a severe sensorimotor polyneuropathy with clear demyelinating features and markedly slow conduction velocities.  I had a very high suspicion for CMT.  In addition, she had severely prolonged median motor nerve latencies at the wrist, out of proportion to the ulnar motor nerve latencies, and an ultrasound performed on the same day showed diffuse enlargement and hypoechoic signal of bilateral median nerves at multiple locations, but there was more focal enlargement at the distal wrist crease, compared to the forearm, suggestive of superimposed bilateral carpal tunnel syndrome.  You prescribed her wrist braces and she just got them today, so has not used them yet.      Regarding other symptoms, she has a strong vascular history including TIAs and peripheral arterial disease affecting the right leg, requiring arterial bypass last  year.  Prior to the bypass, she had pain at the right foot when standing and pallor, which improved substantially after the procedure. She still has a rubber band-like sensation in her right leg at times.  It does not wake her up at night.  She reported mild numbness in both feet for at least 3-4 years and she has noticed she is a bit off balance for about a year.  The off balance feeling is worse with visual deprivation such as, for example, in the shower when eyes are closed.  in this setting, she has to hold on walls.  However, she does not use any cane or walker and denies recent falls.  She has had a number of remote falls, slipping on ice, resulting in neck and back injuries, and she continues to have stiffness and musculoskeletal pain in her neck and back that is quite bothersome.      Interestingly, she does not believe she had any clumsiness or weakness of her hands in the previous years.  She said she was capable of chopping wood, raking leaves, mowing the lawn, doing fine hand movements with no limitations including when she was a teenager and in her 20s.  She was slow in her class at school, but was not tripping or falling, never required foot surgery and never had scoliosis, hip surgeries or needing braces.  I elicited a detailed family history. She has 3 children, aged 40 (daughter), 34 and 29 (two sons).  Her daughter had surgery in her right foot for high arch 2 years ago, but otherwise no neuromuscular symptoms, abnormal gait, foot problems, or hand weakness were reported by the patient for her children.  She has 4 siblings -57-year-old brother and sister twins, a 61-year-old brother and a 59-year-old sister, none of which reportedly have similar symptoms and neither did her parents who have passed.  She has never had genetic testing.      She reports occasional dysphagia.  She has to sometimes eat carefully with cutting food into smaller pieces, but she does not choke and does not have any problems  with liquids.  She denies dyspnea on exertion or orthopnea.  I did not repeat the neuromuscular examination today, because I had already done one in clinic.      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 Her foot and hand appearance were extremely characteristic.  The history she provides is highly unusual and I suspect that she must have had weakness and atrophy of her distal muscles for a much longer time that she overlooked or disregarded because it was rather mild.  There is no other way I can explain the discordance between the EMG findings, and her clinical appearance, which suggest a very chronic problem, and her subjective report of symptoms being present for only 6 months. I will ask our genetic counselor to contact her to perform comprehensive Next Gen sequencing for hereditary neuropathies, which is now available for free through the [employer] panel.  Results are expected in a few weeks.  The patient is in agreement with this plan.      At this point, I do not think she needs orthotics because she does not trip or fall, but we can revisit this in the future and request an evaluation by physical or occupational therapy.      While the diagnosis of superimposed bilateral carpal tunnel syndrome in patients with severe underlying demyelinating neuropathy can be very difficult, both the EMG and the ultrasound findings point to this diagnosis and the description of her hand symptoms has a flavor of carpal tunnel syndrome.  I agree with using wrist splints, but if those are not helping much, one may consider an orthopedic/hand surgery referral for steroid injections for the carpal tunnel syndrome or surgery. I placed this referral and the patient can do it at her discretion, if the braces do not provide a satisfactory result.      I will refer her to PM&R for management of her neck and back  musculoskeletal pain after previous falls/injuries.  She will return to clinic for followup in 1 year or sooner if needed. TT spent on this encounter 50 minutes including 30 on video call, 10 in post-visit note dictation, editing,and orders, and 10 in pre-visit chart review.      Sincerely,            cc:   [place] [address]         SHAYAN HOLM MD             D: 2021   T: 2021   MT: al      Name:     KASIA WAN   MRN:      [id number]        Account:      [id number]   :      1960           Service Date: 2021      Document: [id number]

## 2021-04-07 NOTE — PROGRESS NOTES
Shirley is a 60 year old who is being evaluated via a billable video visit.      How would you like to obtain your AVS? MyChart  If the video visit is dropped, the invitation should be resent by: Send to e-mail at: [email or web address]  Will anyone else be joining your video visit? No      Video Start Time: 12.05 pm  Video-Visit Details    Type of service:  Video Visit    Video End Time:12.35 pm    Originating Location (pt. Location): Home    Distant Location (provider location):  [facility] [place]     Platform used for Video Visit: Eveo

## 2021-04-07 NOTE — LETTER
2021       RE: Shirley Hendricks  [address]  [facility] [address]     Dear Colleague,    Thank you for referring your patient, Shirley Hendricks, to the [facility] [place] [facility]. Please see a copy of my visit note below.    Shirley is a 60 year old who is being evaluated via a billable video visit.      How would you like to obtain your AVS? MyChart  If the video visit is dropped, the invitation should be resent by: Send to e-mail at: [email or web address]  Will anyone else be joining your video visit? No      Video-Visit Details    Type of service:  Video Visit    Video Start Time: 12.05 pm  Video End Time:12.35 pm    Originating Location (pt. Location): Home    Distant Location (provider location):  [facility] NEUROLOGY [facility]     Platform used for Video Visit: eTutor      Service Date: 2021      Edy Minor MD   [facility]   [address] [place] [address]      RE: Shirley Hendricks   MRN: [id number]   : 1960      Dear Edy:      I had the pleasure to evaluate Shirley Hendricks via billable video visit today.  Thank you for this referral. As you know, she is a very pleasant 60-year-old woman who was referred to me for an EMG on  with chief complaint of bilateral hand pain and paresthesias for about 6 months.  She is having tingling and numbness in her hands and pain, triggered by activities including repetitive wrist motion or flexion.  The symptoms are worse when she wakes up in the morning, although she does not describe a typical flick sign.  She is a  and they tend to get aggravated a bit when driving.  On top of this, however, you observed that she had profound thenar AND hypothenar muscle atrophy, high-arched feet and hammertoes and you requested a more extensive investigation to rule out neuropathy.  Indeed, I was impressed  by pes cavus and distal foot weakness, and her EMG study showed a severe sensorimotor polyneuropathy with clear demyelinating features and markedly slow conduction velocities.  I had a very high suspicion for CMT.  In addition, she had severely prolonged median motor nerve latencies at the wrist, out of proportion to the ulnar motor nerve latencies, and an ultrasound performed on the same day showed diffuse enlargement and hypoechoic signal of bilateral median nerves at multiple locations, but there was more focal enlargement at the distal wrist crease, compared to the forearm, suggestive of superimposed bilateral carpal tunnel syndrome.  You prescribed her wrist braces and she just got them today, so has not used them yet.      Regarding other symptoms, she has a strong vascular history including TIAs and peripheral arterial disease affecting the right leg, requiring arterial bypass last year.  Prior to the bypass, she had pain at the right foot when standing and pallor, which improved substantially after the procedure. She still has a rubber band-like sensation in her right leg at times.  It does not wake her up at night.  She reported mild numbness in both feet for at least 3-4 years and she has noticed she is a bit off balance for about a year.  The off balance feeling is worse with visual deprivation such as, for example, in the shower when eyes are closed.  in this setting, she has to hold on walls.  However, she does not use any cane or walker and denies recent falls.  She has had a number of remote falls, slipping on ice, resulting in neck and back injuries, and she continues to have stiffness and musculoskeletal pain in her neck and back that is quite bothersome.      Interestingly, she does not believe she had any clumsiness or weakness of her hands in the previous years.  She said she was capable of chopping wood, raking leaves, mowing the lawn, doing fine hand movements with no limitations including when  she was a teenager and in her 20s.  She was slow in her class at school, but was not tripping or falling, never required foot surgery and never had scoliosis, hip surgeries or needing braces.  I elicited a detailed family history. She has 3 children, aged 40 (daughter), 34 and 29 (two sons).  Her daughter had surgery in her right foot for high arch 2 years ago, but otherwise no neuromuscular symptoms, abnormal gait, foot problems, or hand weakness were reported by the patient for her children.  She has 4 siblings -57-year-old brother and sister twins, a 61-year-old brother and a 59-year-old sister, none of which reportedly have similar symptoms and neither did her parents who have passed.  She has never had genetic testing.      She reports occasional dysphagia.  She has to sometimes eat carefully with cutting food into smaller pieces, but she does not choke and does not have any problems with liquids.  She denies dyspnea on exertion or orthopnea.  I did not repeat the neuromuscular examination today, because I had already done one in clinic.      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 Her foot and hand appearance were extremely characteristic.  The history she provides is highly unusual and I suspect that she must have had weakness and atrophy of her distal muscles for a much longer time that she overlooked or disregarded because it was rather mild.  There is no other way I can explain the discordance between the EMG findings, and her clinical appearance, which suggest a very chronic problem, and her subjective report of symptoms being present for only 6 months. I will ask our genetic counselor to contact her to perform comprehensive Next Gen sequencing for hereditary neuropathies, which is now available for free through the [employer] panel.  Results are expected in a few weeks.  The patient is in  agreement with this plan.      At this point, I do not think she needs orthotics because she does not trip or fall, but we can revisit this in the future and request an evaluation by physical or occupational therapy.      While the diagnosis of superimposed bilateral carpal tunnel syndrome in patients with severe underlying demyelinating neuropathy can be very difficult, both the EMG and the ultrasound findings point to this diagnosis and the description of her hand symptoms has a flavor of carpal tunnel syndrome.  I agree with using wrist splints, but if those are not helping much, one may consider an orthopedic/hand surgery referral for steroid injections for the carpal tunnel syndrome or surgery. I placed this referral and the patient can do it at her discretion, if the braces do not provide a satisfactory result.      I will refer her to PM&R for management of her neck and back musculoskeletal pain after previous falls/injuries.  She will return to clinic for followup in 1 year or sooner if needed. TT spent on this encounter 50 minutes including 30 on video call, 10 in post-visit note dictation, editing,and orders, and 10 in pre-visit chart review.      Sincerely,       SHAYAN HOLM MD          cc:   Vasquez Benoit MD   [address] [address]           D: 2021   T: 2021   MT: al      Name:     KASIA WAN   MRN:      [id number]        Account:      [id number]   :      1960           Service Date: 2021      Document: [id number]

## 2021-04-07 NOTE — PATIENT INSTRUCTIONS
Genetic counselor referral to test for hereditary neuropathy (CMT)  Orthopedics/Hand surgery referral for carpal tunnel  PMR referral for neck/back pain  Follow up 1 year

## 2021-04-08 ENCOUNTER — OFFICE VISIT (OUTPATIENT)
Dept: CARDIOLOGY | Facility: CLINIC | Age: 61
End: 2021-04-08
Payer: COMMERCIAL

## 2021-04-08 ENCOUNTER — TELEPHONE (OUTPATIENT)
Dept: NEUROLOGY | Facility: CLINIC | Age: 61
End: 2021-04-08

## 2021-04-08 VITALS
BODY MASS INDEX: 20.63 KG/M2 | HEIGHT: 62 IN | DIASTOLIC BLOOD PRESSURE: 72 MMHG | WEIGHT: 112.1 LBS | HEART RATE: 68 BPM | SYSTOLIC BLOOD PRESSURE: 140 MMHG

## 2021-04-08 DIAGNOSIS — R42 LIGHT HEADEDNESS: Primary | ICD-10-CM

## 2021-04-08 DIAGNOSIS — I25.10 CORONARY ARTERY DISEASE INVOLVING NATIVE CORONARY ARTERY OF NATIVE HEART WITHOUT ANGINA PECTORIS: ICD-10-CM

## 2021-04-08 DIAGNOSIS — I73.9 PAD (PERIPHERAL ARTERY DISEASE) (H): ICD-10-CM

## 2021-04-08 DIAGNOSIS — I47.10 SVT (SUPRAVENTRICULAR TACHYCARDIA) (H): ICD-10-CM

## 2021-04-08 DIAGNOSIS — E78.2 MIXED HYPERLIPIDEMIA: ICD-10-CM

## 2021-04-08 DIAGNOSIS — I10 ESSENTIAL HYPERTENSION: ICD-10-CM

## 2021-04-08 PROCEDURE — 99215 OFFICE O/P EST HI 40 MIN: CPT | Performed by: INTERNAL MEDICINE

## 2021-04-08 ASSESSMENT — MIFFLIN-ST. JEOR: SCORE: 1021.86

## 2021-04-08 NOTE — TELEPHONE ENCOUNTER
LVM for patient to call me back directly to schedule GC visit with Mirna Lundberg or Zunilda Rodriguez in adult Neuro.

## 2021-04-08 NOTE — LETTER
4/8/2021    Vasquez Benoit MD  [address] [facility] [address]    RE: Shirley Hendricks       Dear Colleague,    I had the pleasure of seeing Shirley Hendricks in the [facility].    CARDIOLOGY CLINIC FOLLOW-UP NOTE      REASON FOR VISIT:   Labile hypertension, lightheadedness    PRIMARY CARE PHYSICIAN:  Vasquez Benoit        History of Present Illness   Shirley Hendricks is an extremely pleasant 60 year old female, previously a patient of Dr. Landry who had recently seen Dr. Mason in early 2020, here for follow-up and to discuss recent lightheadedness issues.  She has an extremely complex past medical history which I will attempt to briefly summarize.  She has severe peripheral vascular disease for which she has undergone right CEA in 2016 and left CEA in 6/2020, remote aortobifemoral bypass and left femoral to popliteal bypass, as well as recent right femoral graft limb to above-knee popliteal bypass using a cadaveric SFA graft in 9/2020, diffuse moderate CAD without severe focal lesions on coronary angiography in 2009, hypertension, hyperlipidemia, and continued smoking.    Today she returns to clinic primarily to discuss issues that she has had with lightheadedness.  She states that for probably the last year or so, she has noted some issues with lightheadedness.  It is hard for her to describe exactly what she is feeling, but she does note that this is not a dizzy or room spinning sensation.  She notes that this tends to happen more often during times of stress, and currently she is undergoing a great deal of stress due to her recent health issues as well as marital issues with her .  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  She notes that she has never gotten this while driving her car.  She states that she has  never come close to passing out while driving the bus.  When she is at home, she does check her blood pressure when this occurs, and often she finds that her blood pressure is very high, sometimes as high as in the 180s-190s.  Her recorded blood pressure in the office today is 140/72, and on review of her prior blood pressures, she has 1 blood pressure on 3/16/2021 that was 95/59.  She does not notice any chest pain with these lightheadedness issues, but does occasionally feel some tingling down her left arm.  She sees neurology for multiple neurologic symptoms as well, and it does appear that an extensive work-up is being done with no definite explanation that has been found to this point.    On review of her data, her most recent labs are from 1/5/2021, and show normal electrolytes, creatinine of 0.58 with estimated GFR greater than 90, normal transaminases, normal TSH, normal glucose, and a normal CBC other than a platelet count of 133.  Her most recent ECG was from 9/14/2020, and shows sinus bradycardia with low voltage and poor R wave progression.  Her most recent ischemic evaluation was a Lexiscan MPI on 2/19/2020, which showed nontransmural infarction of the mid to distal anterolateral segment of the LV, with a mild to moderate degree of lydia-infarct ischemia.  LVEF at stress was 65%.  She had a 10-day Zio patch in December 2019 that showed 3 NSVT runs up to 10 beats in length, and 47 SVT runs up to 20 beats in length.  The most recent echocardiogram that I can see is from 3/4/2016, shows normal LV size and function, normal RV size and function, no significant valvular disease, and a bubble study was done that showed no right to left shunting.  I reviewed her 2009 angiogram images, and this shows diffuse, moderate disease throughout all of her coronary beds, but no severe focal lesions.  Finally, her most recent carotid duplex was from 9/10/2020, and showed 50 to 70% stenosis of the mid ICA, but otherwise  less than 50% stenosis elsewhere.      Assessment & Plan     1. Recurrent lightheadedness of uncertain etiology  2. Severe peripheral vascular disease s/p right CEA in 2016 and left CEA in 6/2020, remote aortobifemoral bypass and left femoral to popliteal bypass, as well as recent right femoral graft limb to above-knee popliteal bypass using a cadaveric SFA graft in 9/2020  3. Diffuse, moderate nonobstructive CAD in 2009  4. Labile hypertension  5. Hyperlipidemia  6. Continued tobacco abuse      I had a lengthy discussion today with Shirley regarding the potential etiologies of her lightheadedness.  As she clearly has multiple other neurologic issues going on, there certainly could be a neurologic component to all this as well, and I would defer to her neurology team as far as the necessary work-up for this and the likelihood that neurologic issues could be contributing.  However, when for potential cardiac and vascular causes, we discussed a few possibilities.  First, she does have a history of quite labile hypertension.  She reports to me some blood pressures at home up into the 190s systolic, but at the same time she does have documented blood pressures here within the past month in the 90s systolic therefore, I recommended that we start by getting an understanding of her blood pressure really is doing during the day, and I ordered a 24-hour ambulatory blood pressure cuff.  Next, she does have a history of both SVT and NSVT on her 2019 Zio patch.  Her description of the symptoms does not seem overly consistent with an arrhythmia genic cause, with her prior history of arrhythmia and certainly her extensive vascular and by now likely progressive coronary disease, she certainly could be prone to arrhythmias.  I will check another 14-day Zio patch to further evaluate.  We also discussed how her description of frequent neurologic symptoms during her lightheadedness episodes could be an indication of a contribution from  worsening carotid disease.  I will recheck bilateral carotid duplex ultrasounds as well.  After this, we did discuss that we should reassess her LV systolic function and assess for any significant valvular disease with a repeat echocardiogram.  Her cardiac exam is not particularly impressive for murmurs, but her heart sounds are fairly distant with likely lung trapping from COPD.  We will actually make this an exercise stress echocardiogram to also evaluate for obstructive CAD.  Finally, I did discuss again that if all of the above work-up is unremarkable, there is certainly a possibility that her lightheadedness is noncardiac in nature.  As her description is fairly unclear, I am also not entirely clear on whether this vasovagal, BPPV, or perhaps a somatic expression of anxiety is this frequently happens during stressful periods at home.  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  If we are able to do this, I would then do defer to her primary physician and neurologist regarding whether or not she would be safe to continue driving at that point.      -Exercise stress echocardiogram  -14-day Zio patch  -Bilateral carotid duplex  -24-hour ambulatory blood pressure monitor  -Recommended that she not drive until above work-up complete    -Continue amlodipine 5 mg twice daily, carvedilol 6.25 mg twice daily, and lisinopril 20 mg twice daily for now  -Continue Plavix 75 mg daily and Crestor 40 mg daily  -Recommended complete smoking cessation; patient not ready to do this at the present time      Follow-up: 1 month to discuss above testing      On the date of the patient's visit, I spent a total of 52 minutes reviewing the patient's chart; interviewing, examining, and counseling the patient; coordinating with other providers as necessary, entering orders, and documenting in the medical chart.      Jabari CASAREZ  MD [facility]  Interventional Cardiology  April 8, 2021        Medications   Current Outpatient Medications   Medication     acetaminophen (TYLENOL) 500 MG tablet     amLODIPine (NORVASC) 5 MG tablet     BREO ELLIPTA 200-25 MCG/INH Inhaler     CALCIUM 600-D 600-400 MG-UNIT TABS     carvedilol (COREG) 6.25 MG tablet     clopidogrel (PLAVIX) 75 MG tablet     denosumab (PROLIA) 60 MG/ML SOLN injection     lisinopril (ZESTRIL) 20 MG tablet     nitroGLYcerin (NITROSTAT) 0.4 MG sublingual tablet     omeprazole (PRILOSEC) 20 MG DR capsule     rosuvastatin (CRESTOR) 40 MG tablet     CALCIUM PO     No current facility-administered medications for this visit.      Allergies   Allergies   Allergen Reactions     Contrast Dye Anaphylaxis     RASH, FACIAL AND NECK SWELLING, SOB, WHEEZING     Pantoprazole      Protonix caused diffuse edema     Chantix [Varenicline]      Terrible dreams     Penicillins Itching         Physical Exam       BP: (!) 140/72 Pulse: 68            Vital Signs with Ranges  Pulse:  [68] 68  BP: (140)/(72) 140/72  112 lbs 1.6 oz    Constitutional: Well-appearing, no acute distress  Respiratory: Normal respiratory effort, faint diffuse wheezes  Cardiovascular: RRR, distant heart sounds but no obvious m/r/g.  JVP < 7 cm H2O.  There is no LE edema.  Normal carotid upstrokes, no carotid bruits.    Thank you for allowing me to participate in the care of your patient.      Sincerely,     Jabari Huang MD     [facility]    cc:   No referring provider defined for this encounter.

## 2021-04-09 NOTE — PROGRESS NOTES
CARDIOLOGY CLINIC FOLLOW-UP NOTE      REASON FOR VISIT:   Labile hypertension, lightheadedness    PRIMARY CARE PHYSICIAN:  Vasquez Benoit        History of Present Illness   Shirley Hendricks is an extremely pleasant 60 year old female, previously a patient of Dr. Landry who had recently seen Dr. Mason in early 2020, here for follow-up and to discuss recent lightheadedness issues.  She has an extremely complex past medical history which I will attempt to briefly summarize.  She has severe peripheral vascular disease for which she has undergone right CEA in 2016 and left CEA in 6/2020, remote aortobifemoral bypass and left femoral to popliteal bypass, as well as recent right femoral graft limb to above-knee popliteal bypass using a cadaveric SFA graft in 9/2020, diffuse moderate CAD without severe focal lesions on coronary angiography in 2009, hypertension, hyperlipidemia, and continued smoking.    Today she returns to clinic primarily to discuss issues that she has had with lightheadedness.  She states that for probably the last year or so, she has noted some issues with lightheadedness.  It is hard for her to describe exactly what she is feeling, but she does note that this is not a dizzy or room spinning sensation.  She notes that this tends to happen more often during times of stress, and currently she is undergoing a great deal of stress due to her recent health issues as well as marital issues with her .  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  She notes that she has never gotten this while driving her car.  She states that she has never come close to passing out while driving the bus.  When she is at home, she does check her blood pressure when this occurs, and often she finds that her blood pressure is very high, sometimes as high as in the 180s-190s.  Her recorded blood  pressure in the office today is 140/72, and on review of her prior blood pressures, she has 1 blood pressure on 3/16/2021 that was 95/59.  She does not notice any chest pain with these lightheadedness issues, but does occasionally feel some tingling down her left arm.  She sees neurology for multiple neurologic symptoms as well, and it does appear that an extensive work-up is being done with no definite explanation that has been found to this point.    On review of her data, her most recent labs are from 1/5/2021, and show normal electrolytes, creatinine of 0.58 with estimated GFR greater than 90, normal transaminases, normal TSH, normal glucose, and a normal CBC other than a platelet count of 133.  Her most recent ECG was from 9/14/2020, and shows sinus bradycardia with low voltage and poor R wave progression.  Her most recent ischemic evaluation was a Lexiscan MPI on 2/19/2020, which showed nontransmural infarction of the mid to distal anterolateral segment of the LV, with a mild to moderate degree of lydia-infarct ischemia.  LVEF at stress was 65%.  She had a 10-day Zio patch in December 2019 that showed 3 NSVT runs up to 10 beats in length, and 47 SVT runs up to 20 beats in length.  The most recent echocardiogram that I can see is from 3/4/2016, shows normal LV size and function, normal RV size and function, no significant valvular disease, and a bubble study was done that showed no right to left shunting.  I reviewed her 2009 angiogram images, and this shows diffuse, moderate disease throughout all of her coronary beds, but no severe focal lesions.  Finally, her most recent carotid duplex was from 9/10/2020, and showed 50 to 70% stenosis of the mid ICA, but otherwise less than 50% stenosis elsewhere.      Assessment & Plan     1. Recurrent lightheadedness of uncertain etiology  2. Severe peripheral vascular disease s/p right CEA in 2016 and left CEA in 6/2020, remote aortobifemoral bypass and left femoral to  popliteal bypass, as well as recent right femoral graft limb to above-knee popliteal bypass using a cadaveric SFA graft in 9/2020  3. Diffuse, moderate nonobstructive CAD in 2009  4. Labile hypertension  5. Hyperlipidemia  6. Continued tobacco abuse      I had a lengthy discussion today with Shirley regarding the potential etiologies of her lightheadedness.  As she clearly has multiple other neurologic issues going on, there certainly could be a neurologic component to all this as well, and I would defer to her neurology team as far as the necessary work-up for this and the likelihood that neurologic issues could be contributing.  However, when for potential cardiac and vascular causes, we discussed a few possibilities.  First, she does have a history of quite labile hypertension.  She reports to me some blood pressures at home up into the 190s systolic, but at the same time she does have documented blood pressures here within the past month in the 90s systolic therefore, I recommended that we start by getting an understanding of her blood pressure really is doing during the day, and I ordered a 24-hour ambulatory blood pressure cuff.  Next, she does have a history of both SVT and NSVT on her 2019 Zio patch.  Her description of the symptoms does not seem overly consistent with an arrhythmia genic cause, with her prior history of arrhythmia and certainly her extensive vascular and by now likely progressive coronary disease, she certainly could be prone to arrhythmias.  I will check another 14-day Zio patch to further evaluate.  We also discussed how her description of frequent neurologic symptoms during her lightheadedness episodes could be an indication of a contribution from worsening carotid disease.  I will recheck bilateral carotid duplex ultrasounds as well.  After this, we did discuss that we should reassess her LV systolic function and assess for any significant valvular disease with a repeat echocardiogram.   Her cardiac exam is not particularly impressive for murmurs, but her heart sounds are fairly distant with likely lung trapping from COPD.  We will actually make this an exercise stress echocardiogram to also evaluate for obstructive CAD.  Finally, I did discuss again that if all of the above work-up is unremarkable, there is certainly a possibility that her lightheadedness is noncardiac in nature.  As her description is fairly unclear, I am also not entirely clear on whether this vasovagal, BPPV, or perhaps a somatic expression of anxiety is this frequently happens during stressful periods at home.  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  If we are able to do this, I would then do defer to her primary physician and neurologist regarding whether or not she would be safe to continue driving at that point.      -Exercise stress echocardiogram  -14-day Zio patch  -Bilateral carotid duplex  -24-hour ambulatory blood pressure monitor  -Recommended that she not drive until above work-up complete    -Continue amlodipine 5 mg twice daily, carvedilol 6.25 mg twice daily, and lisinopril 20 mg twice daily for now  -Continue Plavix 75 mg daily and Crestor 40 mg daily  -Recommended complete smoking cessation; patient not ready to do this at the present time      Follow-up: 1 month to discuss above testing      On the date of the patient's visit, I spent a total of 52 minutes reviewing the patient's chart; interviewing, examining, and counseling the patient; coordinating with other providers as necessary, entering orders, and documenting in the medical chart.      Jabari Huang MD  Interventional Cardiology  April 8, 2021        Medications   Current Outpatient Medications   Medication     acetaminophen (TYLENOL) 500 MG tablet     amLODIPine (NORVASC) 5 MG tablet     BREO ELLIPTA 200-25 MCG/INH Inhaler      CALCIUM 600-D 600-400 MG-UNIT TABS     carvedilol (COREG) 6.25 MG tablet     clopidogrel (PLAVIX) 75 MG tablet     denosumab (PROLIA) 60 MG/ML SOLN injection     lisinopril (ZESTRIL) 20 MG tablet     nitroGLYcerin (NITROSTAT) 0.4 MG sublingual tablet     omeprazole (PRILOSEC) 20 MG DR capsule     rosuvastatin (CRESTOR) 40 MG tablet     CALCIUM PO     No current facility-administered medications for this visit.      Allergies   Allergies   Allergen Reactions     Contrast Dye Anaphylaxis     RASH, FACIAL AND NECK SWELLING, SOB, WHEEZING     Pantoprazole      Protonix caused diffuse edema     Chantix [Varenicline]      Terrible dreams     Penicillins Itching         Physical Exam       BP: (!) 140/72 Pulse: 68            Vital Signs with Ranges  Pulse:  [68] 68  BP: (140)/(72) 140/72  112 lbs 1.6 oz    Constitutional: Well-appearing, no acute distress  Respiratory: Normal respiratory effort, faint diffuse wheezes  Cardiovascular: RRR, distant heart sounds but no obvious m/r/g.  JVP < 7 cm H2O.  There is no LE edema.  Normal carotid upstrokes, no carotid bruits.

## 2021-04-11 ENCOUNTER — OFFICE VISIT (OUTPATIENT)
Dept: URGENT CARE | Facility: URGENT CARE | Age: 61
End: 2021-04-11
Payer: COMMERCIAL

## 2021-04-11 VITALS
SYSTOLIC BLOOD PRESSURE: 95 MMHG | TEMPERATURE: 97.5 F | RESPIRATION RATE: 18 BRPM | DIASTOLIC BLOOD PRESSURE: 57 MMHG | OXYGEN SATURATION: 98 % | HEART RATE: 58 BPM

## 2021-04-11 DIAGNOSIS — J01.90 ACUTE SINUSITIS WITH SYMPTOMS > 10 DAYS: Primary | ICD-10-CM

## 2021-04-11 PROCEDURE — 99213 OFFICE O/P EST LOW 20 MIN: CPT | Performed by: PHYSICIAN ASSISTANT

## 2021-04-11 RX ORDER — DOXYCYCLINE HYCLATE 100 MG
100 TABLET ORAL 2 TIMES DAILY
Qty: 20 TABLET | Refills: 0 | Status: SHIPPED | OUTPATIENT
Start: 2021-04-11 | End: 2021-04-21

## 2021-04-11 RX ORDER — FLUTICASONE PROPIONATE 50 MCG
1 SPRAY, SUSPENSION (ML) NASAL DAILY
Qty: 16 G | Refills: 0 | Status: SHIPPED | OUTPATIENT
Start: 2021-04-11 | End: 2021-06-22

## 2021-04-11 NOTE — PROGRESS NOTES
Assessment & Plan     There are no diagnoses linked to this encounter.    {2021 E&M time (Optional):371928}    {Provider  Link to [facility] Help Grid :[id number]}    No follow-ups on file.    Diagnosis and treatment plan was reviewed with patient and/or family.   We went over any labs or imaging. Discussed worsening symptoms or little to no relief despite treatment plan to follow-up with PCP or return to clinic.  Patient verbalizes understanding. All questions were addressed and answered.     Eleni Pritchard PA-C  [facility] URGENT CARE [facility]    CHIEF COMPLAINT:   Chief Complaint   Patient presents with     Urgent Care     slight cough, running nose, watery eye and facial pressure pain for a few months.     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 clot     Post-menopausal      Reflux esophagitis 2/04    EGD: esophagitis and medium HH     Uncomplicated asthma      Vitamin C deficiency 8/12/2018     Past Surgical History:   Procedure Laterality Date     ANGIOGRAM       BYPASS GRAFT FEMOROPOPLITEAL Right 9/23/2020    Procedure: RIGHT FEMORAL GRAFT TO ABOVE-KNEE POPLITEAL BYPASS USING CADAVERIC SUPERFICIAL FEMORAL  ARTERY;  Surgeon: Chadwick Lozano MD;  Location:  OR     C FABRIC WRAPPING OF ABDOMINAL ANEURYSM       CARDIAC CATHERIZATION  9/3/2009    multivessel CAD without target lesions, med mgmt indicated, preserved ef     ENDARTERECTOMY CAROTID Right 5/11/2016    Procedure: ENDARTERECTOMY CAROTID;  Surgeon: Chadwick Lozano MD;  Location:  OR     ENDARTERECTOMY CAROTID Left 6/8/2020    Procedure: LEFT CAROTID ENDARTERECTOMY with distal facal vein patch  and EEG;  Surgeon: Chadwick Lozano MD;  Location:  OR     GYN SURGERY  left tube    left salpingectomy     LAPAROSCOPIC CHOLECYSTECTOMY N/A 9/27/2017    Procedure: LAPAROSCOPIC CHOLECYSTECTOMY;  LAPAROSCOPIC CHOLECYSTECTOMY;  Surgeon: Jacoby Tapia MD;  Location: [facility]     ORTHOPEDIC SURGERY      left knee surgery     VASCULAR SURGERY  aoto bi fem  left fem-AK pop     [facility] NONSPECIFIC PROCEDURE  12/00    angioplasty with stent right fem. a.     [facility] NONSPECIFIC PROCEDURE  1987    sinus surgery     [facility] NONSPECIFIC PROCEDURE  9/2/2009    Emergent left groin exploration with oversewing of bleeding angiographic site. 2. Endarterectomy of common femoral-proximal superficial femoral artery with greater saphenous vein patch angioplasty.   a. [place] of accessory right greater saphenous vein.      [facility] NONSPECIFIC PROCEDURE  8/27/2009    occluded left common iliac and external iliac arteries were successfully revascularized with stenting to 8 and 7 mm      Social History     Tobacco Use     Smoking status: Current Every Day Smoker     Packs/day: 0.25     Years: 40.00     Pack years: 10.00     Types: Cigarettes     Start date: 1958     Smokeless tobacco: Never Used     Tobacco comment: 1/2 PPD   Substance Use Topics     Alcohol use: Yes     Alcohol/week: 0.0 standard drinks     Comment: x1-2 yr     Current Outpatient Medications   Medication     acetaminophen (TYLENOL) 500 MG tablet     amLODIPine (NORVASC) 5 MG tablet     BREO ELLIPTA 200-25  MCG/INH Inhaler     CALCIUM 600-D 600-400 MG-UNIT TABS     CALCIUM PO     carvedilol (COREG) 6.25 MG tablet     clopidogrel (PLAVIX) 75 MG tablet     denosumab (PROLIA) 60 MG/ML SOLN injection     lisinopril (ZESTRIL) 20 MG tablet     omeprazole (PRILOSEC) 20 MG DR capsule     rosuvastatin (CRESTOR) 40 MG tablet     nitroGLYcerin (NITROSTAT) 0.4 MG sublingual tablet     No current facility-administered medications for this visit.      Allergies   Allergen Reactions     Contrast Dye Anaphylaxis     RASH, FACIAL AND NECK SWELLING, SOB, WHEEZING     Pantoprazole      Protonix caused diffuse edema     Chantix [Varenicline]      Terrible dreams     Penicillins Itching       10 point ROS of systems were all negative except for pertinent positives noted in my HPI.      Exam:  BP 95/57 (BP Location: Left arm, Patient Position: Sitting, Cuff Size: Adult Regular)   Pulse 58   Temp 97.5  F (36.4  C) (Tympanic)   Resp 18   SpO2 98%   Constitutional: healthy, alert and no distress  Head: Normocephalic, atraumatic.  Eyes: conjunctiva clear, no drainage  ENT: TMs clear and shiny nicolás, nasal mucosa pink and moist, throat without tonsillar hypertrophy or erythema  Neck: neck is supple, no cervical lymphadenopathy or nuchal rigidity  Cardiovascular: RRR  Respiratory: CTA bilaterally, no rhonchi or rales  Gastrointestinal: soft and nontender  Skin: no rashes  Neurologic: Speech clear, gait normal. Moves all extremities.    No results found for any visits on 04/11/21.

## 2021-04-11 NOTE — PROGRESS NOTES
Assessment & Plan     1. Acute sinusitis with symptoms > 10 days  Symptoms and exam c/w sinusitis  TX as below    - doxycycline hyclate (VIBRA-TABS) 100 MG tablet; Take 1 tablet (100 mg) by mouth 2 times daily for 10 days  Dispense: 20 tablet; Refill: 0  - fluticasone (FLONASE) 50 MCG/ACT nasal spray; Spray 1 spray into both nostrils daily  Dispense: 16 g; Refill: 0      Return in about 1 week (around 4/18/2021), or if symptoms worsen or fail to improve.    Diagnosis and treatment plan was reviewed with patient and/or family.   We went over any labs or imaging. Discussed worsening symptoms or little to no relief despite treatment plan to follow-up with PCP or return to clinic.  Patient verbalizes understanding. All questions were addressed and answered.     Eleni Pritchard PA-C  [facility] URGENT CARE [facility]    CHIEF COMPLAINT:   Chief Complaint   Patient presents with     Urgent Care     slight cough, running nose, watery eye and facial pressure pain for a few months.     Vishal Watson is a 60 year old female who presents to clinic today for evaluation of runny nose, congestion and facial pressure. Has been fighting symptoms for months, now worsening over the past week. Discharge is mainly clear. No history of seasonal allergies. NO fever or chills. She has a chronic cough from smoking.       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 Simons dermatology     Embolism and thrombosis of unspecified artery (H) 8/00    Protein C,S, Leiden FV, Lupus Inhibitor Negative     Gastroesophageal reflux disease      Hyperlipidaemia      Hypertension      Lupus (H)     skin     Mild major depression (H) 11/7/2017     Myocardial infarction (H)     x3     Osteoarthrosis, unspecified whether generalized or localized, unspecified site      PAD (peripheral artery  disease) (H)      Peripheral vascular disease, unspecified (H) 12/00    s/p angioplasty with stent right femoral a.; Right iliac and femoral a. clot     Post-menopausal      Reflux esophagitis 2/04    EGD: esophagitis and medium HH     Uncomplicated asthma      Vitamin C deficiency 8/12/2018     Past Surgical History:   Procedure Laterality Date     ANGIOGRAM       BYPASS GRAFT FEMOROPOPLITEAL Right 9/23/2020    Procedure: RIGHT FEMORAL GRAFT TO ABOVE-KNEE POPLITEAL BYPASS USING CADAVERIC SUPERFICIAL FEMORAL ARTERY;  Surgeon: Chadwick Lozano MD;  Location:  OR     C FABRIC WRAPPING OF ABDOMINAL ANEURYSM       CARDIAC CATHERIZATION  9/3/2009    multivessel CAD without target lesions, med mgmt indicated, preserved ef     ENDARTERECTOMY CAROTID Right 5/11/2016    Procedure: ENDARTERECTOMY CAROTID;  Surgeon: Chadwick Lozano MD;  Location:  OR     ENDARTERECTOMY CAROTID Left 6/8/2020    Procedure: LEFT CAROTID ENDARTERECTOMY with distal facal vein patch  and EEG;  Surgeon: Chadwick Lozano MD;  Location:  OR     GYN SURGERY  left tube    left salpingectomy     LAPAROSCOPIC CHOLECYSTECTOMY N/A 9/27/2017    Procedure: LAPAROSCOPIC CHOLECYSTECTOMY;  LAPAROSCOPIC CHOLECYSTECTOMY;  Surgeon: Jacoby Tapia MD;  Location:  [facility]     ORTHOPEDIC SURGERY      left knee surgery     VASCULAR SURGERY  aoto bi fem  left fem-AK pop     [facility] NONSPECIFIC PROCEDURE  12/00    angioplasty with stent right fem. a.     [facility] NONSPECIFIC PROCEDURE  1987    sinus surgery     [facility] NONSPECIFIC PROCEDURE  9/2/2009    Emergent left groin exploration with oversewing of bleeding angiographic site. 2. Endarterectomy of common femoral-proximal superficial femoral artery with greater saphenous vein patch angioplasty.   a. [place] of accessory right greater saphenous vein.      [facility] NONSPECIFIC PROCEDURE  8/27/2009    occluded left common iliac and external iliac arteries were successfully revascularized with stenting to 8  and 7 mm      Social History     Tobacco Use     Smoking status: Current Every Day Smoker     Packs/day: 0.25     Years: 40.00     Pack years: 10.00     Types: Cigarettes     Start date: 1958     Smokeless tobacco: Never Used     Tobacco comment: 1/2 PPD   Substance Use Topics     Alcohol use: Yes     Alcohol/week: 0.0 standard drinks     Comment: x1-2 yr     Current Outpatient Medications   Medication     acetaminophen (TYLENOL) 500 MG tablet     amLODIPine (NORVASC) 5 MG tablet     BREO ELLIPTA 200-25 MCG/INH Inhaler     CALCIUM 600-D 600-400 MG-UNIT TABS     CALCIUM PO     carvedilol (COREG) 6.25 MG tablet     clopidogrel (PLAVIX) 75 MG tablet     denosumab (PROLIA) 60 MG/ML SOLN injection     doxycycline hyclate (VIBRA-TABS) 100 MG tablet     fluticasone (FLONASE) 50 MCG/ACT nasal spray     lisinopril (ZESTRIL) 20 MG tablet     omeprazole (PRILOSEC) 20 MG DR capsule     rosuvastatin (CRESTOR) 40 MG tablet     nitroGLYcerin (NITROSTAT) 0.4 MG sublingual tablet     No current facility-administered medications for this visit.      Allergies   Allergen Reactions     Contrast Dye Anaphylaxis     RASH, FACIAL AND NECK SWELLING, SOB, WHEEZING     Pantoprazole      Protonix caused diffuse edema     Chantix [Varenicline]      Terrible dreams     Penicillins Itching       10 point ROS of systems were all negative except for pertinent positives noted in my HPI.      Exam:  BP 95/57 (BP Location: Left arm, Patient Position: Sitting, Cuff Size: Adult Regular)   Pulse 58   Temp 97.5  F (36.4  C) (Tympanic)   Resp 18   SpO2 98%   Constitutional: healthy, alert and no distress  Head: Normocephalic, atraumatic.  Eyes: conjunctiva clear, no drainage  ENT: TMs clear and shiny nicolás, nasal mucosa pink and moist, throat without tonsillar hypertrophy or erythema. TTP of L maxillary sinus.   Neck: neck is supple, no cervical lymphadenopathy or nuchal rigidity  Cardiovascular: RRR  Respiratory: CTA bilaterally, no rhonchi or  rales  Skin: no rashes  Neurologic: Speech clear, gait normal. Moves all extremities.

## 2021-04-12 NOTE — TELEPHONE ENCOUNTER
Spoke with patient and assisted in scheduling appointment.     Future Appointments   Date Time Provider Department Center   4/20/2021 12:30 PM Zunilda Rodriguez [facility]

## 2021-04-16 ENCOUNTER — HOSPITAL ENCOUNTER (OUTPATIENT)
Dept: CARDIOLOGY | Facility: CLINIC | Age: 61
Discharge: HOME | End: 2021-04-16
Attending: INTERNAL MEDICINE | Admitting: INTERNAL MEDICINE
Payer: COMMERCIAL

## 2021-04-16 ENCOUNTER — HOSPITAL ENCOUNTER (OUTPATIENT)
Dept: CARDIOLOGY | Facility: CLINIC | Age: 61
End: 2021-04-16
Attending: INTERNAL MEDICINE
Payer: COMMERCIAL

## 2021-04-16 ENCOUNTER — HOSPITAL ENCOUNTER (OUTPATIENT)
Dept: ULTRASOUND IMAGING | Facility: CLINIC | Age: 61
End: 2021-04-16
Attending: INTERNAL MEDICINE
Payer: COMMERCIAL

## 2021-04-16 DIAGNOSIS — R42 LIGHT HEADEDNESS: ICD-10-CM

## 2021-04-16 PROCEDURE — 93246 EXT ECG>7D<15D RECORDING: CPT

## 2021-04-16 PROCEDURE — 93788 AMBL BP MNTR W/SW A/R: CPT

## 2021-04-16 PROCEDURE — 93880 EXTRACRANIAL BILAT STUDY: CPT

## 2021-04-16 PROCEDURE — 93321 DOPPLER ECHO F-UP/LMTD STD: CPT | Mod: 26 | Performed by: INTERNAL MEDICINE

## 2021-04-16 PROCEDURE — 93321 DOPPLER ECHO F-UP/LMTD STD: CPT | Mod: TC

## 2021-04-16 PROCEDURE — 93350 STRESS TTE ONLY: CPT | Mod: 26 | Performed by: INTERNAL MEDICINE

## 2021-04-16 PROCEDURE — 93018 CV STRESS TEST I&R ONLY: CPT | Performed by: INTERNAL MEDICINE

## 2021-04-16 PROCEDURE — 93016 CV STRESS TEST SUPVJ ONLY: CPT | Performed by: INTERNAL MEDICINE

## 2021-04-16 PROCEDURE — 93790 AMBL BP MNTR W/SW I&R: CPT | Performed by: INTERNAL MEDICINE

## 2021-04-16 PROCEDURE — 93325 DOPPLER ECHO COLOR FLOW MAPG: CPT | Mod: 26 | Performed by: INTERNAL MEDICINE

## 2021-04-16 PROCEDURE — 93350 STRESS TTE ONLY: CPT | Mod: TC

## 2021-04-19 ENCOUNTER — TELEPHONE (OUTPATIENT)
Dept: CARDIOLOGY | Facility: CLINIC | Age: 61
End: 2021-04-19

## 2021-04-19 DIAGNOSIS — R42 LIGHT HEADEDNESS: Primary | ICD-10-CM

## 2021-04-19 DIAGNOSIS — R07.9 CHEST PAIN, UNSPECIFIED TYPE: ICD-10-CM

## 2021-04-19 DIAGNOSIS — I25.10 CORONARY ARTERY DISEASE INVOLVING NATIVE CORONARY ARTERY OF NATIVE HEART WITHOUT ANGINA PECTORIS: ICD-10-CM

## 2021-04-19 DIAGNOSIS — I73.9 PAD (PERIPHERAL ARTERY DISEASE) (H): ICD-10-CM

## 2021-04-19 NOTE — TELEPHONE ENCOUNTER
Exercise stress echo done on 4/16/21, received result note from Dr. Huang:     Jabari Huang MD  P [facility] Heart Team 1             Unfortunately she did not reach target heart rate.  Let's check a Lexiscan nuclear scan.  However, the remainder of her echo, primarily her normal LVEF at rest, is reassuring.     Thanks,   Jagdeep      Called pt, no answer. Left detailed VM with results and recommendation for a Lexiscan nuclear stress test due to target HR not achieved. Order placed. Left call back numbers for both scheduling and Team 1 if pt has questions.

## 2021-04-20 ENCOUNTER — VIRTUAL VISIT (OUTPATIENT)
Dept: NEUROLOGY | Facility: CLINIC | Age: 61
End: 2021-04-20
Payer: COMMERCIAL

## 2021-04-20 DIAGNOSIS — G56.03 BILATERAL CARPAL TUNNEL SYNDROME: ICD-10-CM

## 2021-04-20 DIAGNOSIS — Z71.83 ENCOUNTER FOR NONPROCREATIVE GENETIC COUNSELING AND TESTING: ICD-10-CM

## 2021-04-20 DIAGNOSIS — G60.0 CHARCOT MARIE TOOTH MUSCULAR ATROPHY: Primary | ICD-10-CM

## 2021-04-20 DIAGNOSIS — Z13.71 ENCOUNTER FOR NONPROCREATIVE GENETIC COUNSELING AND TESTING: ICD-10-CM

## 2021-04-20 PROCEDURE — 99207 PR NO CHARGE LOS: CPT | Performed by: GENETIC COUNSELOR, MS

## 2021-04-20 PROCEDURE — 96040 PR GENETIC COUNSELING, EACH 30 MIN: CPT | Mod: GT | Performed by: GENETIC COUNSELOR, MS

## 2021-04-20 NOTE — PROGRESS NOTES
Name:  Shirley Hendricks  :   1960  MRN:   [id number]  Date of service: 2021  Referring Provider: Babar Scales    Genetic Counseling Consultation Note    Presenting Information:  A consultation in the [facility] was requested for Shirley, a 60 year old female, for evaluation of neuropathy  This consultation was requested by Dr. Scales in Adult Neurology at the patient's visit on 2021.    Shirley attended this visit conducted by video alone.    History is obtained from Shirley and the medical record. I met with the family at the request of Dr. Scales to obtain a personal and family history, discuss possible genetic contributions to her symptoms, and to obtain informed consent for genetic testing.    Personal History:   For additional details, review note on 2021 from Dr. Scales.  To summarize, Shirley has a history of bilateral hand pain and paresthesias.  She reports that this only started about 6 months ago, but that it has progressed rapidly.  She reports that she has no feeling in her hands and feet and that she is quite worried about this.  Shirley reports occasional dysphagia.    On exam Shirley was noted to have profound thenar and hypothenar muscle atrophy.  She also has pes cavus and hammertoes.    Shirley follows with Dr. Gaona in Cardiology.  She has had 3 total heart attacks with the first occurring in  at the age of 49 years of age.  She has hypertension, hyperlipidemia, and a history of and current use of smoking.    Shirley follows with Dr. Lozano in Vascular Medicine.  She has a history of severe peripheral artery disease.  She has had transient ischemic attacks.    Patient Active Problem List   Diagnosis     Reflux esophagitis     Health Care Home     Osteoporosis     Cervicalgia     Hyperlipidemia LDL goal <70     PAD (peripheral artery disease) (H)     Essential hypertension     Coronary artery disease involving native coronary  artery of native heart without angina pectoris     Primary osteoarthritis involving multiple joints     DDD (degenerative disc disease), lumbar     S/P carotid endarterectomy     Chronic allergic rhinitis due to animal hair and dander     Tobacco use disorder     Chronic obstructive pulmonary disease, unspecified COPD type (H)     Anxiety     Vitamin C deficiency     History of colonic polyps     SVT (supraventricular tachycardia) (H)     Past Medical History:   Diagnosis Date     Anxiety 12/7/2017     COPD (chronic obstructive pulmonary disease) (H)      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 clot     Post-menopausal      Reflux esophagitis 2/04    EGD: esophagitis and medium HH     Uncomplicated asthma      Vitamin C deficiency 8/12/2018     Relevant Imaging    Nerve Conduction and EMG    3/23/21    Interpretation:  Abnormal study. There is electrodiagnostic evidence of a severe, demyelinating sensorimotor polyneuropathy, with secondary axonal loss. Those findings are consistent with hereditary neuropathy such as CMT 1 or CMT X. Genetic testing is recommended. It is impossible to reliably diagnose superimposed bilateral median neuropathies at the wrist (carpal tunnel syndrome) in this context, although it should be noted that the median motor distal latencies at the wrist were much more prolonged than the ulnar ones.  Ultrasonography may be helpful in this case. Lastly, while it is hard to exclude superimposed cervical radiculopathies with confidence, overall the most likely explanation for the needle EMG findings outlined above is a motor polyneuropathy like CMT; the neurogenic changes identified show a [id number] gradient, the distal muscles being worse, which would not be typical for radiculopathies. Clinical correlation is recommended.     Neuromuscular US Report    3/23/21    Interpretation: Abnormal study. There is ultrasonographic evidence of a generalized likely demyelinating polyneuropathy based on marked enlargement and hypoechoic signal of bilateral median nerves at multiple locations between the wrist and the elbow. In addition, the presence of more focal enlargement of both median nerves at the distal wrist crease, compared to proximal forearm (CSA ratio >2:1) suggests the possibility of superimposed bilateral carpal tunnel syndrome. Clinical correlation is recommended.     Previous Genetic Testing  Shirley has never had genetic testing.    Family History:   A standard three generation pedigree was obtained and is scanned into the medical record.  History pertinent to referral is underlined.    Children:     39 y/o daughter, history of surgery for arch on right foot    35 y/o son, history of blood clots    31 y/o son, reports numbness in hands    Siblings:     Full siblings:     58 y/o sister (twin), history of Afib and knee replacement    58 y/o brother (twin), reported to have numbness in his hands    62 y/o brother, history of knee replacement and blood clots    60 y/o sister, history of carpal tunnel syndrome    Paternal:     Father: 87 y/o, history of heart attack    Paternal grandfather: No information    Paternal grandmother: No information    Paternal aunts/uncles: 1 uncle  of lung cancer and 1 aunt  of cancer, limited information    Maternal:     Mother:  at 86 y/o d/t metastatic  gallbladder cancer    Maternal grandfather:  in 70s d/t dementia, history of Parkinson's disease diagnosed in 60s-70s    Maternal grandmother:  in 80s d/t complications from diabetes    Maternal aunts/uncles: Aunt in 80s with history of Alzheimer's disease. 63 y/o aunt, healthy. 67 y/o aunt, history of carpal tunnel syndrome. 81 y/o uncle, healthy    Shirley reports that most individuals in the family have had trouble learning and in school; several individuals have been diagnosed with dyslexia.  There are no additional reports of family members with neuropathy, numbness in their extremities, weakness, history of genetic conditions, or genetic testing. Paternal ancestry is of general  descent.  Maternal ancestry is of [place] descent.  Consanguinity is denied.     The family history does raise suspicion for a genetically determined neuropathy.  Several family members report either numbness in the extremities or carpal tunnel syndrome.    Genetic Counseling Discussion:  Charcot Breonna Tooth disease (CMT) is actually a group of disorders that all cause damage to the peripheral nerves, affecting both motor and sensation.  It is a common form of genetic peripheral neuropathy that affects ~1/2,500 individuals.  CMT is sometimes referred to as a hereditary motor and sensory neuropathy because it causes damage to two types of nerves, the motor nerves and the sensory nerves.  There are many genetic causes of CMT that are all associated with slightly different clinical findings and different inheritance patterns.  In general, this condition manifests in muscle weakness that typically presents in early adulthood, but can occur at any age.  CMT is highly variable genetic condition, even within members of the same family. Individuals may not experience any symptoms, these symptoms may be mild, or they might begin as mild and gradually become more severe.      There are many different types of CMT. These types  are labeled with numbers (1,2,4 etc) and can be determined based on the results of a nerve conduction study and by the part of the nerve that is not functioning properly.  Each nerve has two parts, the covering around the nerve called the myelin and the central part of the nerve called the axon. The myelin controls the speed at which the message is sent.     CMT Type 1 is the demyelinating form of the condition.  Demyelinating  means that this type of CMT causes the covering around the nerve (myelin) to form incorrectly. When the myelin is not formed correctly the messages cannot travel the whole length of the nerve and they do not travel at the speed that they would in an individual who does not have CMT. CMT Type 2 is the axonal form of the condition.  Axonal  means that the axon of the nerve is damaged and the messages being sent down the nerves are not clear or accurate. This leads to the muscles not being able to understand the garbled message that is being sent. There are also some less common types of CMT (3, 4) that have a mixture of these axonal and demyelinating features. There are also many subtypes of CMT within each type. These subtypes are labeled with letters (A, B, C etc) and can only be determined through genetic testing.     At this time, we are unable to target genetic testing for a specific type of CMT or gene for Shirley.  In situations like this, we recommend examining numerous genes associated with the presenting symptom.  For Shirley, we are targeting genes associated with inherited neuropathies, including forms of CMT.  We discussed the details, limitations, and possible outcomes of next generation sequencing gene panels, such as the [employer] Comprehensive Neuropathies Panel which includes 102 genes.  There are three types of results:    Negative: meaning no pathogenic variants were identified in the genes that were tested  o A negative result does not rule out the possibility that Shirley's  symptoms are due to a genetic etiology    Positive: meaning one (or more) pathogenic variants were identified in the genes that were tested that are associated with Shirley's symptoms  o We discussed that a positive result could provide an etiology to Shirley's symptoms, give anticipatory guidance as to their potential progression, and clarify risks to family members.  We also discussed that a positive result could indicate that Shirley is at risks for other health concerns, outside of their presenting symptoms    Uncertain: meaning one (or more) variants were identified in the genes that were tested, but there is not enough data to know if this variant is disease-causing; these are called variants of uncertain significance (VUS)  o In most cases, identification of a VUS does not confirm a diagnosis and does not result in any clinically actionable recommendations.  The mutation will be monitored over time to see if more information is known about it in the future.  If a VUS is identified, testing of other relatives may be helpful to provide clarification.    We discussed the potential benefits of genetic testing and why this genetic testing is medically indicated. A positive result will help determine the etiology of neuropath noted in Shirley and could guide medical management for Shirley.  If a genetic cause is found for Shirley, it will give us a more accurate risk assessment for other family members.  Thus, the recommended testing for Shirley  is DIAGNOSTIC testing, and it is NOT investigational.    We discussed the availability of the [employer] Comprehensive Neuropathies Panel which analyzes 102 genes associated with neuropathy. [employer] has partnered with a pharmaceutical company called [employer] to sponsor this testing so it is free of charge to the patient. By participating in the sponsored testing program, the patient agrees to share their genetic testing information with the partner company. The partner company does not  receive any patient identification information (name, date of birth, contact information), but they do receive the ordering providers information and may contact the provider. If Shirley does not want to participate in the sponsored testing program, this testing can go through the insurance coverage process.    Lab results may be automatically released via [employer]. Department protocol is to hold genetic testing results until they have been reviewed. After thorough review, we will contact the family directly to disclose the results and make them accessible via the medical record. This protocol was reviewed with the family, who were in agreement to hold the results for review.    Plan:  1. Shirley expressed verbal informed consent to proceed with genetic testing via the sponsored testing program.  I will mail a buccal collection kit to their home address.  Shirley will follow the included instructions and mail back to the laboratory.   2. The results of genetic testing are available ~3 weeks after the sample is received by the laboratory.  I will call Shirley with the results when they are available. Results will not be left via voicemail, regardless of outcome.  Shirley was in agreement with holding results until provider review.  3. Contact information provided.    Zunilda Rodriguez MS [facility]  Genetic Counselor  Email: [email or web address]  Phone: [phone]  Pager: [phone]    Total Time Spent in Consultation: Approximately 1:07    CC: No Letter

## 2021-04-29 ENCOUNTER — TELEPHONE (OUTPATIENT)
Dept: OTHER | Facility: CLINIC | Age: 61
End: 2021-04-29

## 2021-04-29 NOTE — TELEPHONE ENCOUNTER
Patient requesting for nurse to call regarding making sure how many days to stay off clopidogrel (PLAVIX) 75 MG tablet prior to surgery.     Informed will send a message to nurse for call back.       Jaylin DOLAN

## 2021-04-29 NOTE — TELEPHONE ENCOUNTER
Called pt, she has Surgery with [facility] 5/20/21, for right hand carpal tunnel.     Pt inquiring if okay to hold Plavix (possibly 5 days) for carpal tunnel sx.     Routing to Dr. Lozano for approval.   Will then call pt back with an update, okay to leave .     ESTHER JohnsonN, RN  [facility]  Office:  [phone] Fax: [phone]

## 2021-04-30 NOTE — TELEPHONE ENCOUNTER
Per Dr. Lozano, patient may hold Plavix 5 days prior to surgery.  LVM on patient's home phone with update.  Will also send [employer] message with update.  Requested patient return call should she have further questions.    ESTHER PabonN, RN-[facility] [place]

## 2021-05-13 ENCOUNTER — OFFICE VISIT (OUTPATIENT)
Dept: INTERNAL MEDICINE | Facility: CLINIC | Age: 61
End: 2021-05-13
Payer: COMMERCIAL

## 2021-05-13 VITALS
WEIGHT: 111 LBS | DIASTOLIC BLOOD PRESSURE: 70 MMHG | RESPIRATION RATE: 16 BRPM | SYSTOLIC BLOOD PRESSURE: 132 MMHG | OXYGEN SATURATION: 97 % | BODY MASS INDEX: 20.3 KG/M2 | HEART RATE: 60 BPM | TEMPERATURE: 97.3 F

## 2021-05-13 DIAGNOSIS — G56.03 BILATERAL CARPAL TUNNEL SYNDROME: ICD-10-CM

## 2021-05-13 DIAGNOSIS — I10 ESSENTIAL HYPERTENSION: ICD-10-CM

## 2021-05-13 DIAGNOSIS — G60.0 CHARCOT-MARIE-TOOTH DISEASE: ICD-10-CM

## 2021-05-13 DIAGNOSIS — J44.9 CHRONIC OBSTRUCTIVE PULMONARY DISEASE, UNSPECIFIED COPD TYPE (H): ICD-10-CM

## 2021-05-13 DIAGNOSIS — I73.9 PAD (PERIPHERAL ARTERY DISEASE) (H): ICD-10-CM

## 2021-05-13 DIAGNOSIS — E78.5 HYPERLIPIDEMIA LDL GOAL <70: ICD-10-CM

## 2021-05-13 DIAGNOSIS — Z01.818 PREOP GENERAL PHYSICAL EXAM: Primary | ICD-10-CM

## 2021-05-13 DIAGNOSIS — D69.6 THROMBOCYTOPENIA (H): ICD-10-CM

## 2021-05-13 DIAGNOSIS — F17.200 TOBACCO USE DISORDER: ICD-10-CM

## 2021-05-13 DIAGNOSIS — I25.10 CORONARY ARTERY DISEASE INVOLVING NATIVE CORONARY ARTERY OF NATIVE HEART WITHOUT ANGINA PECTORIS: ICD-10-CM

## 2021-05-13 LAB
ERYTHROCYTE [DISTWIDTH] IN BLOOD BY AUTOMATED COUNT: 13.3 % (ref 10–15)
HCT VFR BLD AUTO: 36 % (ref 35–47)
HGB BLD-MCNC: 11.9 G/DL (ref 11.7–15.7)
MCH RBC QN AUTO: 31.2 PG (ref 26.5–33)
MCHC RBC AUTO-ENTMCNC: 33.1 G/DL (ref 31.5–36.5)
MCV RBC AUTO: 95 FL (ref 78–100)
PLATELET # BLD AUTO: 110 10E9/L (ref 150–450)
RBC # BLD AUTO: 3.81 10E12/L (ref 3.8–5.2)
WBC # BLD AUTO: 5.7 10E9/L (ref 4–11)

## 2021-05-13 PROCEDURE — 99214 OFFICE O/P EST MOD 30 MIN: CPT | Performed by: INTERNAL MEDICINE

## 2021-05-13 PROCEDURE — 93000 ELECTROCARDIOGRAM COMPLETE: CPT | Performed by: INTERNAL MEDICINE

## 2021-05-13 PROCEDURE — 36415 COLL VENOUS BLD VENIPUNCTURE: CPT | Performed by: INTERNAL MEDICINE

## 2021-05-13 PROCEDURE — 85027 COMPLETE CBC AUTOMATED: CPT | Performed by: INTERNAL MEDICINE

## 2021-05-13 RX ORDER — NITROGLYCERIN 0.4 MG/1
0.4 TABLET SUBLINGUAL SEE ADMIN INSTRUCTIONS
Qty: 30 TABLET | Refills: 11 | Status: SHIPPED | OUTPATIENT
Start: 2021-05-13

## 2021-05-13 NOTE — PATIENT INSTRUCTIONS
No solid food after midnight prior to surgery   May have clear liquids up to 2 hours prior to surgery   Use Breo the AM of surgery   Take Amlodipine, Carvedilol, Lisinopril and Omeprazole the AM of surgery   Hold Plavix/Clopidogrel 5 days prior to surgery per Dr Lozano's instructions  I encourage you to stop all smoking.  If unable to quit on your own, you may either try over the counter nicotine patches/gum, contact clinic if interested in prescription therapy (Nicotine supplement/Chantix/Buproprion) or contact the [place] Quit Plan (1-789.200.1967 or [email or web address])

## 2021-05-13 NOTE — PROGRESS NOTES
[address] [address]  Phone: [phone]  Primary Provider: Vasquez Benoit        PREOPERATIVE EVALUATION:  Today's date: 5/13/2021    Shirley Hendricks is a 60 year old female who presents for a preoperative evaluation.    Surgical Information:  Surgery/Procedure: Carpal Tunnel Release-Right Hand  Surgery Location: [facility]  Surgeon: Dr. Jenae Martino  Surgery Date: 05/20/2021  Time of Surgery: TBD - AM  Where patient plans to recover: At home with family  Fax number for surgical facility: [phone]    Type of Anesthesia Anticipated: General    Assessment & Plan     The proposed surgical procedure is considered INTERMEDIATE risk.    Preop general physical exam  Appears medically stable to proceed with surgery as scheduled  - EKG 12-lead complete w/read - Clinics    Bilateral carpal tunnel syndrome    Coronary artery disease involving native coronary artery of native heart without angina pectoris  Recent stress echo nondiagnostic as patient did not fully reach target heart rate but did not have symptoms of chest pain or shortness of breath with the study and no EKG or wall motion abnormality changes were noted.  Has not used nitroglycerin in the last year but requests refill to have on hand if necessary  - nitroGLYcerin (NITROSTAT) 0.4 MG sublingual tablet; Place 1 tablet (0.4 mg) under the tongue See Admin Instructions for chest pain    Thrombocytopenia (H)  Mild.  Not to the level of affecting postsurgical clotting  - CBC with platelets    Charcot-Breonna-Tooth disease  Recent diagnosis.  Continue management per neurology    PAD (peripheral artery disease) (H)  Status post surgical procedures.  Patient denies current claudication symptoms with ambulation    Essential hypertension  Controlled with medication    Chronic obstructive pulmonary disease, unspecified COPD type (H)  Controlled with inhaler therapy    Hyperlipidemia  LDL goal <70  At goal with statin therapy    Tobacco use disorder  Currently smoking 1/2-1 pack/day.  Patient not interested in quitting despite strong physician recommendation with history of vascular disease           Risks and Recommendations:  The patient has the following additional risks and recommendations for perioperative complications:   - No identified additional risk factors other than previously addressed     Patient instructions:   No solid food after midnight prior to surgery   May have clear liquids up to 2 hours prior to surgery   Use Breo the AM of surgery   Take Amlodipine, Carvedilol, Lisinopril and Omeprazole the AM of surgery   Hold Plavix/Clopidogrel 5 days prior to surgery per Dr Lozano's instructions  I encourage you to stop all smoking.  If unable to quit on your own, you may either try over the counter nicotine patches/gum, contact clinic if interested in prescription therapy (Nicotine supplement/Chantix/Buproprion) or contact the [place] Quit Plan (1-735.817.3788 or www.[email or web address])      RECOMMENDATION:  APPROVAL GIVEN to proceed with proposed procedure, without further diagnostic evaluation.         Subjective     HPI related to upcoming procedure:  History of numbness bilateral hands.  Has undergone EMG testing which showed electrodiagnostic evidence of a severe, demyelinating sensorimotor polyneuropathy, with secondary axonal loss. Those findings are consistent with hereditary neuropathy such as CMT. IN addition,  ultrasonographic studies showed a generalized likely demyelinating polyneuropathy based on marked enlargement and hypoechoic signal of bilateral median nerves at multiple locations between the wrist and the elbow. In addition, the presence of more focal enlargement of both median nerves at the distal wrist crease, compared to proximal forearm (CSA ratio >2:1) suggested the possibility of superimposed bilateral carpal tunnel syndrome.  Following consultation with neurology, it was  recommended patient be further evaluated for treatment of bilateral carpal tunnel syndrome.  Following consultation with orthopedic surgery, patient is elected to undergo surgical treatment on the right as above.  See below for other medical history.  With regards to current exercise tolerance, patient is able to do recent yard work including raking without symptoms of chest pain or shortness of breath.      Preop Questions 5/13/2021   1. Have you ever had a heart attack or stroke? YES - Previous Ant/Lat MI. Many years ago. Also hx TIA and PAD    2. Have you ever had surgery on your heart or blood vessels, such as a stent placement, a coronary artery bypass, or surgery on an artery in your head, neck, heart, or legs? YES - See surgical hx below   3. Do you have chest pain with activity? No   4. Do you have a history of  heart failure? No   5. Do you currently have a cold, bronchitis or symptoms of other infection? No   6. Do you have a cough, shortness of breath, or wheezing? No   7. Do you or anyone in your family have previous history of blood clots? YES -  Brother (LE DVT) and son  (LE & PE).   stent   8. Do you or does anyone in your family have a serious bleeding problem such as prolonged bleeding following surgeries or cuts? No   9. Have you ever had problems with anemia or been told to take iron pills? No   10. Have you had any abnormal blood loss such as black, tarry or bloody stools, or abnormal vaginal bleeding? No   11. Have you ever had a blood transfusion? No   12. Are you willing to have a blood transfusion if it is medically needed before, during, or after your surgery? Yes   13. Have you or any of your relatives ever had problems with anesthesia? No   14. Do you have sleep apnea, excessive snoring or daytime drowsiness? No   15. Do you have any artifical heart valves or other implanted medical devices like a pacemaker, defibrillator, or continuous glucose monitor? No   16. Do you have artificial  joints? No   17. Are you allergic to latex? No   18. Is there any chance that you may be pregnant? No       Health Care Directive:  Patient does not have a Health Care Directive or Living Will:     Preoperative Review of :   reviewed - 20 tabs Oxycodone prescribed 9/23/20 after  vascular surgery. Not using now       Status of Chronic Conditions:  COPD - Patient has a longstanding history of moderate-severe COPD . Patient has been doing well overall noting NO SYMPTOMS and continues on medication regimen consisting of  Breo without adverse reactions or side effects.    HYPERLIPIDEMIA - Patient has a long history of significant Hyperlipidemia requiring medication for treatment with recent good control. Patient reports no problems or side effects with the medication.     HYPERTENSION - Patient has longstanding history of HTN , currently denies any symptoms referable to elevated blood pressure. Specifically denies chest pain, palpitations, dyspnea, orthopnea, PND. Blood pressure readings have been in normal range. Current medication regimen is as listed below. Patient denies any side effects of medication.      CAD - NO recent chest pain with exertion. Underwent recent Stress ECHO that was negative for ischemia but pt did not reach target HR with the study so inconclusive. Did not have chest pain during the study. Normal EF and no WMA seen on study    PAD - Previous aortobifemoral bypass graft, left femoral to popliteal in situ bypass graft and  a right femoral graft limb to above-knee popliteal bypass using a cadaveric SFA graft.  Denies current claudication symptoms and her lower extremities.  Most recent vascular surgery follow-up and imaging February 2021    GERD -controlled with PPI     TOBACCO USE - Smoking 1/2 - 1 ppd.  Not interested in quitting at this time    OSTEOPOROSIS -  On Prolia injections. Stable     CHARCOT AMAN TOOTH - recent diagnosis. Numbness in her hands and feet.  Noted to have profound  thenar and hypothenar muscle atrophy.  She also has pes cavus and hammertoes. Followed by Neurology. Not requiring braces on LEs.     Review of Systems  CONSTITUTIONAL: NEGATIVE for fever, chills.  10 pound weight loss in 1 year  INTEGUMENTARY/SKIN: NEGATIVE for worrisome rashes, moles or lesions  EYES: NEGATIVE for vision changes or irritation  ENT/MOUTH: NEGATIVE for ear, mouth and throat problems  RESP: NEGATIVE for significant cough or SOB with Breo inhaler. Still smoking  BREAST: NEGATIVE for masses, tenderness or discharge  CV: NEGATIVE for chest pain,  peripheral edema. Hx some palpitations. Ziopatch showed  Multiple brief episodes of SVT with longest lasting 17 secs  GI: NEGATIVE for nausea, abdominal pain, heartburn, or change in bowel habits.   : NEGATIVE for frequency, dysuria, or hematuria  MUSCULOSKELETAL: NEGATIVE for significant arthralgias or myalgia  NEURO:  See HPI above  ENDOCRINE: NEGATIVE for temperature intolerance. On statin  HEME: NEGATIVE for bleeding problems.  History of mild thrombocytopenia he can get down to get out  PSYCHIATRIC: POSITIVE for some stress caring for  who is blind but denies actual depression    Patient Active Problem List    Diagnosis Date Noted     Health Care Home 06/06/2011     Priority: High     EMERGENCY CARE PLAN  Presenting Problem Signs and Symptoms Treatment Plan    Questions or conerns during clinic hours    I will call the clinic directly     Questions or conerns outside clinic hours    I will call the 24 hour nurse line at [phone]    Patient needs to schedule an appointment    I will call the 24 hour scheduling team at [phone] or clinic directly    Same day treatment     I will call the clinic first, nurse line if after hours, urgent care and express care if needed                            DX V65.8 REPLACED WITH [id number] [facility] CARE HOME (04/08/2013)       Charcot-Breonna-Tooth disease      Priority: Medium     Bilateral carpal tunnel syndrome       Priority: Medium     Thrombocytopenia (H)      Priority: Medium     History of colonic polyps 02/21/2020     Priority: Medium     SVT (supraventricular tachycardia) (H) 02/21/2020     Priority: Medium     Vitamin C deficiency 08/12/2018     Priority: Medium     Anxiety 12/07/2017     Priority: Medium     Chronic allergic rhinitis due to animal hair and dander 07/20/2017     Priority: Medium     Tobacco use disorder 07/20/2017     Priority: Medium     Chronic obstructive pulmonary disease, unspecified COPD type (H) 07/20/2017     Priority: Medium     S/P carotid endarterectomy 07/07/2016     Priority: Medium     RIGHT SIDE       Coronary artery disease involving native coronary artery of native heart without angina pectoris 03/03/2016     Priority: Medium     Primary osteoarthritis involving multiple joints 03/03/2016     Priority: Medium     DDD (degenerative disc disease), lumbar 03/03/2016     Priority: Medium     Hyperlipidemia LDL goal <70 07/07/2014     Priority: Medium     PAD (peripheral artery disease) (H) 07/07/2014     Priority: Medium     Essential hypertension 07/07/2014     Priority: Medium     Problem list name updated by automated process. 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 Simons dermatology     Embolism and thrombosis of unspecified artery (H) 08/2000    Protein C,S, Leiden FV, Lupus Inhibitor Negative     Gastroesophageal reflux disease      Hyperlipidaemia      Hypertension      Lupus (H)     skin     Mild major depression (H) 11/07/2017     Myocardial infarction (H)      x3     Osteoarthrosis, unspecified whether generalized or localized, unspecified site      PAD (peripheral artery disease) (H)      Peripheral vascular disease, unspecified (H) 12/2000    s/p angioplasty with stent right femoral a.; Right iliac and femoral a. clot     Post-menopausal      Reflux esophagitis 02/2004    EGD: esophagitis and medium HH     SVT (supraventricular tachycardia) (H)      Thrombocytopenia (H)      Uncomplicated asthma      Vitamin C deficiency 08/12/2018     Past Surgical History:   Procedure Laterality Date     ANGIOGRAM       BYPASS GRAFT FEMOROPOPLITEAL Right 9/23/2020    Procedure: RIGHT FEMORAL GRAFT TO ABOVE-KNEE POPLITEAL BYPASS USING CADAVERIC SUPERFICIAL FEMORAL ARTERY;  Surgeon: Chadwick Lozano MD;  Location:  OR     C FABRIC WRAPPING OF ABDOMINAL ANEURYSM       CARDIAC CATHERIZATION  9/3/2009    multivessel CAD without target lesions, med mgmt indicated, preserved ef     ENDARTERECTOMY CAROTID Right 5/11/2016    Procedure: ENDARTERECTOMY CAROTID;  Surgeon: Chadwick Lozano MD;  Location:  OR     ENDARTERECTOMY CAROTID Left 6/8/2020    Procedure: LEFT CAROTID ENDARTERECTOMY with distal facal vein patch  and EEG;  Surgeon: Chadwick Lozano MD;  Location:  OR     GYN SURGERY  left tube    left salpingectomy     LAPAROSCOPIC CHOLECYSTECTOMY N/A 9/27/2017    Procedure: LAPAROSCOPIC CHOLECYSTECTOMY;  LAPAROSCOPIC CHOLECYSTECTOMY;  Surgeon: Jacoby Tapia MD;  Location: [facility]     ORTHOPEDIC SURGERY      left knee surgery     VASCULAR SURGERY  aoto bi fem  left fem-AK pop     [facility] NONSPECIFIC PROCEDURE  12/00    angioplasty with stent right fem. a.     [facility] NONSPECIFIC PROCEDURE  1987    sinus surgery     [facility] NONSPECIFIC PROCEDURE  9/2/2009    Emergent left groin exploration with oversewing of bleeding angiographic site. 2. Endarterectomy of common femoral-proximal superficial femoral artery with greater saphenous vein patch angioplasty.   a. [place] of  accessory right greater saphenous vein.      [facility] NONSPECIFIC PROCEDURE  8/27/2009    occluded left common iliac and external iliac arteries were successfully revascularized with stenting to 8 and 7 mm      Current Outpatient Medications   Medication Sig Dispense Refill     acetaminophen (TYLENOL) 500 MG tablet Take 500-1,000 mg by mouth every 6 hours as needed for mild pain       amLODIPine (NORVASC) 5 MG tablet Take 1 tablet (5 mg) by mouth 2 times daily 180 tablet 3     BREO ELLIPTA 200-25 MCG/INH Inhaler Inhale 1 puff into the lungs daily (Patient taking differently: Inhale 1 puff into the lungs daily ) 3 Inhaler 3     CALCIUM 600-D 600-400 MG-UNIT TABS Take 1 tablet by mouth 2 times daily 180 tablet 3     CALCIUM PO Take 1 tablet by mouth every evening       carvedilol (COREG) 6.25 MG tablet TAKE ONE TABLET BY MOUTH TWICE A DAY WITH MEALS  180 tablet 3     clopidogrel (PLAVIX) 75 MG tablet Take 1 tablet (75 mg) by mouth daily 90 tablet 3     denosumab (PROLIA) 60 MG/ML SOLN injection Inject 1 mL (60 mg) Subcutaneous every 6 months INDICATION: TO TREAT OSTEOPOROSIS 1 Syringe 0     fluticasone (FLONASE) 50 MCG/ACT nasal spray Spray 1 spray into both nostrils daily 16 g 0     lisinopril (ZESTRIL) 20 MG tablet Take 1 tablet (20 mg) by mouth 2 times daily. 180 tablet 3     nitroGLYcerin (NITROSTAT) 0.4 MG sublingual tablet Place 1 tablet (0.4 mg) under the tongue See Admin Instructions for chest pain 30 tablet 11     omeprazole (PRILOSEC) 20 MG DR capsule TAKE ONE CAPSULE BY MOUTH DAILY (Patient taking differently: Take 20 mg by mouth daily TAKE ONE CAPSULE BY MOUTH DAILY) 90 capsule 3     rosuvastatin (CRESTOR) 40 MG tablet Take 1 tablet (40 mg) by mouth At Bedtime 90 tablet 2       Allergies   Allergen Reactions     Contrast Dye Anaphylaxis     RASH, FACIAL AND NECK SWELLING, SOB, WHEEZING     Pantoprazole      Protonix caused diffuse edema     Chantix [Varenicline]      Terrible dreams     Penicillins Itching         Social History     Tobacco Use     Smoking status: Current Every Day Smoker     Packs/day: 0.25     Years: 40.00     Pack years: 10.00     Types: Cigarettes     Start date: 1958     Smokeless tobacco: Never Used     Tobacco comment: 1/2 PPD   Substance Use Topics     Alcohol use: Yes     Alcohol/week: 0.0 standard drinks     Comment: x1-2 yr     Family History   Problem Relation Age of Onset     Cancer Mother         bladder     Cardiovascular Father         alive,multiple heart attacks     Diabetes Maternal Grandmother      Lung Cancer Maternal Grandmother      Blood Disease Brother         clotting disorder     History   Drug Use No         Objective     /70   Pulse 60   Temp 97.3  F (36.3  C) (Temporal)   Resp 16   Wt 50.3 kg (111 lb)   SpO2 97%   BMI 20.30 kg/m      Physical Exam     HENT: ear canals and TM's normal and nose and mouth without ulcers or lesions   Neck: no adenopathy. Thyroid normal to palpation. No bruits  Pulm: Lungs clear to auscultation with slight prolonged expiratory phase.  No wheezes or rhonchi   CV: Regular rates and rhythm  GI: Soft, nontender, Normal active bowel sounds, No hepatosplenomegaly or masses palpable  Ext: Peripheral pulses intact. No edema. Thenar and hypothenar muscle atrophy  Neuro: Decreased light touch sensation distal bilateral upper lower extremities.  Dorsiflexion strength 4/5 bilaterally      Recent Labs   Lab Test 01/05/21  1119 09/25/20  0807 09/24/20  0739 09/23/20  0844 09/23/20  0844 06/08/20  1302 06/08/20  1302   HGB 12.8 12.8 11.4*  --   --    < > 14.1   * 158 114*   < >  --    < > 149*   INR  --   --  1.01  --   --   --   --     139 133  --   --    < > 137   POTASSIUM 4.4 3.6 4.1  --  4.1   < > 4.1   CR 0.58 0.53 0.47*  --  0.47*   < > 0.56   A1C  --   --   --   --  5.4  --  5.5    < > = values in this interval not displayed.        Diagnostics:  Component      Latest Ref Rng & Units 5/13/2021   WBC      4.0 - 11.0 10e9/L  5.7   RBC Count      3.8 - 5.2 10e12/L 3.81   Hemoglobin      11.7 - 15.7 g/dL 11.9   Hematocrit      35.0 - 47.0 % 36.0   MCV      78 - 100 fl 95   MCH      26.5 - 33.0 pg 31.2   MCHC      31.5 - 36.5 g/dL 33.1   RDW      10.0 - 15.0 % 13.3   Platelet Count      150 - 450 10e9/L 110 (L)         EKG: Sinus bradycardia with rate 56.  Poor R wave progression consistent with old anterior infarct.  No acute ST/T changes.  No change from previous EKGs    Revised Cardiac Risk Index (RCRI):  The patient has the following serious cardiovascular risks for perioperative complications:   - Coronary Artery Disease (MI, positive stress test, angina, Qs on EKG) = 1 point   - Cerebrovascular Disease (TIA or CVA) = 1 point     RCRI Interpretation: 2 points: Class III (moderate risk - 6.6% complication rate)     Estimated Functional Capacity: Performs 4 METS exercise without symptoms (e.g., light housework, stairs, 4 mph walk, 7 mph bike, slow step dance)  Patient underwent recent stress echo 4/16/21 and did not have symptoms of chest pain or shortness of breath with this and stopped due to fatigue.  No EKG changes or WMA changes were seen during the study.  However, patient was not able to reach target heart rate  Stress Results         Protocol:  [facility]iezer Protocol        Maximum Predicted HR:   158 bpm         Target HR: 134 bpm               % Maximum Predicted HR: 66 %        Stage  DurationHeart Rate  BP                    Comment             (mm:ss)   (bpm)    Stage 1   3:00      104   158/80Patient requested to stop   RecoveryR  4:00      56    134/74RPP = 96352, [place] = 2, FAC = Below average             Stress Duration:   3:00 mm:ss        Recovery Time: 4:00 mm:ss          Maximum Stress HR: 104 bpm           METS:          4              Signed Electronically by: Vasquez Benoit MD  Copy of this evaluation report is provided to requesting physician.

## 2021-05-13 NOTE — PROGRESS NOTES
"[facility]    I had the pleasure of seeing Shirley when she came for follow up of test results.  This 60 year old sees Dr. Huang for her history of:    1. Severe PVD - s/p R CEA , L CEA 2020, remote aortobifemoral bypass and L fem-pop bypass and R femoral graft limb to above-knee popliteal bypass with cadaveric SFA graft 2020. Sees Dr. Lozano  2. Diffuse moderate CAD - no focal lesions   3. HTN with episodes of hypotension  4. Hyperlipidemia  5. Continued smoking   6. Chronic lightheadedness  - mostly a/w psychological/emotional stress. Oftentimes with high BP. Neuro w/u unrevealing.    Dr. Huang met Shirley  to review the above. She'd previously been a patient of Dr. Landry.  She c/o lightheadedness, noting this x 1 year, typically with increased emotional/psychological stress. SBPs at times have been 180-190s during these times.  He reviewed previous testing, with EKG showing mild SB, ZioPatch showing no sx'c arrhythmia, carotids with <70% dz. Given her labile BP, he rec'd 24 hour BP monitor and repeat 2 week ZP rec'd. Carotids and exercise stress echo. She couldn't reach THR and nuc was ordered (not yet done).    I am meeting her today to review these tests.      Interval History:  Notes lightheadedness has improved somewhat inexplicably. She has been working on stress management which she thinks has really helped.  No syncope.     She notes she will hold lisinopril if BP is low (<130 mmHg) but takes all other meds. There's been no change in her lightheadedness with blood pressure changes.    No CP. No edema, orthopnea, PND.    VITALS:  Vitals: /67   Pulse 67   Ht 1.575 m (5' 2\")   Wt 49.9 kg (110 lb)   BMI 20.12 kg/m      Diagnostic Testin hour Ambulatory BP 2021 with avg 114/61 bpm. Max systolic 151 mmHg. Low systolic 77 mmHg  Carotids 2021 R common carotid 70%, <50% KIERA. 50-69% LICA  ZioPatch - - SR with 1 episode of VT and 49 episodes of SVT, " all asx'c.  In SR, avg HR 60 bpm (44-94 bpm). VT x 4 beats with avg  bpm. 49 episodes SVT to 171 bpm (avg 124 bpm)  Stress Echocardiogram 4/16/2021 - didn't reach THR, exercised 3minutes. EF 60-65%. No WMA  Stress Testing 2/2020 - mid-distal anterolateral nontransmural infart with mild-mod lydia-infarct ischemia. Nl EF      Plan:  Nuc stress test as ordered by Dr. Huang    Assessment/Plan:    1. Lightheadedness    This has been persistent and work up from Neuro (Dr. Mejia) and now cardiology unrevealing    She did not reach target heart rate on Exercise Echo and rec'd to have a nuclear stress test. This was not done yet    PLAN:    Dr. Huang noted he'd defer any additional testing/imaging to Neurology (ic CTA/MRA neck)    Nuc stress test - Dr. Huang's team will contact with results and will determine follow-up.     No EP issues -  Does not need to follow with me/EP BRANDON again.     2. PVD, CAD    As above, this is extensive    Remains on Coreg, Plavix, lisinopril and high intensity statin therapy    PLAN:    Quit smoking    Continue follow-up with Dr. Blake Farah, PA-C, MSPAS      No orders of the defined types were placed in this encounter.    No orders of the defined types were placed in this encounter.    There are no discontinued medications.      Encounter Diagnosis   Name Primary?     Light headedness        CURRENT MEDICATIONS:  Current Outpatient Medications   Medication Sig Dispense Refill     acetaminophen (TYLENOL) 500 MG tablet Take 500-1,000 mg by mouth every 6 hours as needed for mild pain       amLODIPine (NORVASC) 5 MG tablet Take 1 tablet (5 mg) by mouth 2 times daily 180 tablet 3     BREO ELLIPTA 200-25 MCG/INH Inhaler Inhale 1 puff into the lungs daily (Patient taking differently: Inhale 1 puff into the lungs daily ) 3 Inhaler 3     CALCIUM 600-D 600-400 MG-UNIT TABS Take 1 tablet by mouth 2 times daily 180 tablet 3     CALCIUM PO Take 1 tablet by mouth every evening    "    carvedilol (COREG) 6.25 MG tablet TAKE ONE TABLET BY MOUTH TWICE A DAY WITH MEALS  180 tablet 3     clopidogrel (PLAVIX) 75 MG tablet Take 1 tablet (75 mg) by mouth daily 90 tablet 3     denosumab (PROLIA) 60 MG/ML SOLN injection Inject 1 mL (60 mg) Subcutaneous every 6 months INDICATION: TO TREAT OSTEOPOROSIS 1 Syringe 0     fluticasone (FLONASE) 50 MCG/ACT nasal spray Spray 1 spray into both nostrils daily 16 g 0     lisinopril (ZESTRIL) 20 MG tablet Take 1 tablet (20 mg) by mouth 2 times daily. 180 tablet 3     nitroGLYcerin (NITROSTAT) 0.4 MG sublingual tablet Place 1 tablet (0.4 mg) under the tongue See Admin Instructions for chest pain 30 tablet 11     omeprazole (PRILOSEC) 20 MG DR capsule TAKE ONE CAPSULE BY MOUTH DAILY (Patient taking differently: Take 20 mg by mouth daily TAKE ONE CAPSULE BY MOUTH DAILY) 90 capsule 3     rosuvastatin (CRESTOR) 40 MG tablet Take 1 tablet (40 mg) by mouth At Bedtime 90 tablet 2       ALLERGIES     Allergies   Allergen Reactions     Contrast Dye Anaphylaxis     RASH, FACIAL AND NECK SWELLING, SOB, WHEEZING     Pantoprazole      Protonix caused diffuse edema     Chantix [Varenicline]      Terrible dreams     Penicillins Itching         Review of Systems:  Skin:  Negative     Eyes:  Positive for glasses  ENT:  Negative    Respiratory:  Positive for dyspnea on exertion  Cardiovascular:    lightheadedness;dizziness;Positive for;chest pain;palpitations;fatigue  Gastroenterology: Negative    Genitourinary:  Negative    Musculoskeletal:  Positive for neck pain  Neurologic:  Positive for headaches;migraine headaches  Psychiatric:  Positive for excessive stress  Heme/Lymph/Imm:       Endocrine:  Negative      Physical Exam:  Vitals: /67   Pulse 67   Ht 1.575 m (5' 2\")   Wt 49.9 kg (110 lb)   BMI 20.12 kg/m      Constitutional:  cooperative, alert and oriented, well developed, well nourished, in no acute distress        Skin:  warm and dry to the touch        Head:  " normocephalic, no masses or lesions        Eyes:  pupils equal and round;conjunctivae and lids unremarkable;sclera white        ENT:  not assessed this visit        Neck:  not assessed this visit        Chest:  not assessed this visit        Cardiac: regular rhythm                  Abdomen:           Vascular: not assessed this visit                                      Extremities and Back:  no spinal abnormalities noted        Neurological:  no gross motor deficits            PAST MEDICAL HISTORY:  Past Medical History:   Diagnosis Date     Anxiety 12/7/2017     COPD (chronic obstructive pulmonary disease) (H)      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 clot     Post-menopausal      Reflux esophagitis 2/04    EGD: esophagitis and medium HH     Uncomplicated asthma      Vitamin C deficiency 8/12/2018       PAST SURGICAL HISTORY:  Past Surgical History:   Procedure Laterality Date     ANGIOGRAM       BYPASS GRAFT FEMOROPOPLITEAL Right 9/23/2020    Procedure: RIGHT FEMORAL GRAFT TO ABOVE-KNEE POPLITEAL BYPASS USING CADAVERIC SUPERFICIAL FEMORAL ARTERY;  Surgeon: Chadwick Lozano MD;  Location:  OR     C FABRIC WRAPPING OF ABDOMINAL ANEURYSM       CARDIAC CATHERIZATION  9/3/2009    multivessel CAD without target lesions, med mgmt indicated, preserved ef     ENDARTERECTOMY CAROTID Right 5/11/2016    Procedure:  ENDARTERECTOMY CAROTID;  Surgeon: Chadwick Lozano MD;  Location:  OR     ENDARTERECTOMY CAROTID Left 6/8/2020    Procedure: LEFT CAROTID ENDARTERECTOMY with distal facal vein patch  and EEG;  Surgeon: Chadwick Lozano MD;  Location:  OR     GYN SURGERY  left tube    left salpingectomy     LAPAROSCOPIC CHOLECYSTECTOMY N/A 9/27/2017    Procedure: LAPAROSCOPIC CHOLECYSTECTOMY;  LAPAROSCOPIC CHOLECYSTECTOMY;  Surgeon: Jacoby Tapia MD;  Location:  [facility]     ORTHOPEDIC SURGERY      left knee surgery     VASCULAR SURGERY  aoto bi fem  left fem-AK pop     [facility] NONSPECIFIC PROCEDURE  12/00    angioplasty with stent right fem. a.     [facility] NONSPECIFIC PROCEDURE  1987    sinus surgery     [facility] NONSPECIFIC PROCEDURE  9/2/2009    Emergent left groin exploration with oversewing of bleeding angiographic site. 2. Endarterectomy of common femoral-proximal superficial femoral artery with greater saphenous vein patch angioplasty.   a. [place] of accessory right greater saphenous vein.      [facility] NONSPECIFIC PROCEDURE  8/27/2009    occluded left common iliac and external iliac arteries were successfully revascularized with stenting to 8 and 7 mm        FAMILY HISTORY:  Family History   Problem Relation Age of Onset     Cancer Mother         bladder     Cardiovascular Father         alive,multiple heart attacks     Diabetes Maternal Grandmother      Lung Cancer Maternal Grandmother      Blood Disease Brother         clotting disorder       SOCIAL HISTORY:  Social History     Socioeconomic History     Marital status:      Spouse name: None     Number of children: None     Years of education: None     Highest education level: None   Occupational History     None   Social Needs     Financial resource strain: None     Food insecurity     Worry: None     Inability: None     Transportation needs     Medical: None     Non-medical: None   Tobacco Use     Smoking status: Current Every Day Smoker     Packs/day: 0.25      Years: 40.00     Pack years: 10.00     Types: Cigarettes     Start date: 1958     Smokeless tobacco: Never Used     Tobacco comment: 1/2 PPD   Substance and Sexual Activity     Alcohol use: Yes     Alcohol/week: 0.0 standard drinks     Comment: x1-2 yr     Drug use: No     Sexual activity: Yes     Partners: Male     Birth control/protection: Surgical   Lifestyle     Physical activity     Days per week: None     Minutes per session: None     Stress: None   Relationships     Social connections     Talks on phone: None     Gets together: None     Attends [employer] service: None     Active member of club or organization: None     Attends meetings of clubs or organizations: None     Relationship status: None     Intimate partner violence     Fear of current or ex partner: None     Emotionally abused: None     Physically abused: None     Forced sexual activity: None   Other Topics Concern     Parent/sibling w/ CABG, MI or angioplasty before 65F 55M? Not Asked   Social History Narrative     None

## 2021-05-14 ENCOUNTER — OFFICE VISIT (OUTPATIENT)
Dept: CARDIOLOGY | Facility: CLINIC | Age: 61
End: 2021-05-14
Attending: INTERNAL MEDICINE
Payer: COMMERCIAL

## 2021-05-14 VITALS
BODY MASS INDEX: 20.24 KG/M2 | WEIGHT: 110 LBS | HEIGHT: 62 IN | DIASTOLIC BLOOD PRESSURE: 67 MMHG | SYSTOLIC BLOOD PRESSURE: 104 MMHG | HEART RATE: 67 BPM

## 2021-05-14 DIAGNOSIS — R42 LIGHT HEADEDNESS: ICD-10-CM

## 2021-05-14 PROCEDURE — 99213 OFFICE O/P EST LOW 20 MIN: CPT | Performed by: PHYSICIAN ASSISTANT

## 2021-05-14 ASSESSMENT — MIFFLIN-ST. JEOR: SCORE: 1012.21

## 2021-05-14 NOTE — PATIENT INSTRUCTIONS
Shirley - it was nice to meet you today!    1. Reviewed testing (copies given)  2. Awaiting nuclear stress test    PLAN:  1. No changes from cardiac perspective  2. Will set up the stress testing and Dr. Huang's nurses will contact you with results and arrange follow-up     [phone]

## 2021-05-14 NOTE — LETTER
"5/14/2021    Vasquez Benoit MD  [address] [facility] [address]    RE: Shirley Vangquin       Dear Colleague,    I had the pleasure of seeing Shirley Hendricks in the [facility].    [facility]    I had the pleasure of seeing Shirley when she came for follow up of test results.  This 60 year old sees Dr. Huang for her history of:    1. Severe PVD - s/p R CEA 2016, L CEA 6/2020, remote aortobifemoral bypass and L fem-pop bypass and R femoral graft limb to above-knee popliteal bypass with cadaveric SFA graft 9/2020. Sees Dr. Lozano  2. Diffuse moderate CAD - no focal lesions 2009  3. HTN with episodes of hypotension  4. Hyperlipidemia  5. Continued smoking   6. Chronic lightheadedness  - mostly a/w psychological/emotional stress. Oftentimes with high BP. Neuro w/u unrevealing.    Dr. Huang met Shirley 4/8 to review the above. She'd previously been a patient of Dr. Landry.  She c/o lightheadedness, noting this x 1 year, typically with increased emotional/psychological stress. SBPs at times have been 180-190s during these times.  He reviewed previous testing, with EKG showing mild SB, ZioPatch showing no sx'c arrhythmia, carotids with <70% dz. Given her labile BP, he rec'd 24 hour BP monitor and repeat 2 week ZP rec'd. Carotids and exercise stress echo. She couldn't reach THR and nuc was ordered (not yet done).    I am meeting her today to review these tests.      Interval History:  Notes lightheadedness has improved somewhat inexplicably. She has been working on stress management which she thinks has really helped.  No syncope.     She notes she will hold lisinopril if BP is low (<130 mmHg) but takes all other meds. There's been no change in her lightheadedness with blood pressure changes.    No CP. No edema, orthopnea, PND.    VITALS:  Vitals: /67   Pulse 67   Ht 1.575 m (5' 2\")   Wt 49.9 kg (110 lb)   BMI 20.12 kg/m  "     Diagnostic Testin hour Ambulatory BP 2021 with avg 114/61 bpm. Max systolic 151 mmHg. Low systolic 77 mmHg  Carotids 2021 R common carotid 70%, <50% KIERA. 50-69% LICA  Ilirtch - - SR with 1 episode of VT and 49 episodes of SVT, all asx'c.  In SR, avg HR 60 bpm (44-94 bpm). VT x 4 beats with avg  bpm. 49 episodes SVT to 171 bpm (avg 124 bpm)  Stress Echocardiogram 2021 - didn't reach THR, exercised 3minutes. EF 60-65%. No WMA  Stress Testing 2020 - mid-distal anterolateral nontransmural infart with mild-mod lydia-infarct ischemia. Nl EF      Plan:  Nuc stress test as ordered by Dr. Huagn    Assessment/Plan:    1. Lightheadedness    This has been persistent and work up from Neuro (Dr. Mejia) and now cardiology unrevealing    She did not reach target heart rate on Exercise Echo and rec'd to have a nuclear stress test. This was not done yet    PLAN:    Dr. Huang noted he'd defer any additional testing/imaging to Neurology (ic CTA/MRA neck)    Nuc stress test - Dr. Huang's team will contact with results and will determine follow-up.     No EP issues -  Does not need to follow with me/EP BRANDON again.     2. PVD, CAD    As above, this is extensive    Remains on Coreg, Plavix, lisinopril and high intensity statin therapy    PLAN:    Quit smoking    Continue follow-up with Dr. Blake Farah PA-C, MSPAS      No orders of the defined types were placed in this encounter.    No orders of the defined types were placed in this encounter.    There are no discontinued medications.      Encounter Diagnosis   Name Primary?     Light headedness        CURRENT MEDICATIONS:  Current Outpatient Medications   Medication Sig Dispense Refill     acetaminophen (TYLENOL) 500 MG tablet Take 500-1,000 mg by mouth every 6 hours as needed for mild pain       amLODIPine (NORVASC) 5 MG tablet Take 1 tablet (5 mg) by mouth 2 times daily 180 tablet 3     BREO ELLIPTA 200-25 MCG/INH Inhaler  Inhale 1 puff into the lungs daily (Patient taking differently: Inhale 1 puff into the lungs daily ) 3 Inhaler 3     CALCIUM 600-D 600-400 MG-UNIT TABS Take 1 tablet by mouth 2 times daily 180 tablet 3     CALCIUM PO Take 1 tablet by mouth every evening       carvedilol (COREG) 6.25 MG tablet TAKE ONE TABLET BY MOUTH TWICE A DAY WITH MEALS  180 tablet 3     clopidogrel (PLAVIX) 75 MG tablet Take 1 tablet (75 mg) by mouth daily 90 tablet 3     denosumab (PROLIA) 60 MG/ML SOLN injection Inject 1 mL (60 mg) Subcutaneous every 6 months INDICATION: TO TREAT OSTEOPOROSIS 1 Syringe 0     fluticasone (FLONASE) 50 MCG/ACT nasal spray Spray 1 spray into both nostrils daily 16 g 0     lisinopril (ZESTRIL) 20 MG tablet Take 1 tablet (20 mg) by mouth 2 times daily. 180 tablet 3     nitroGLYcerin (NITROSTAT) 0.4 MG sublingual tablet Place 1 tablet (0.4 mg) under the tongue See Admin Instructions for chest pain 30 tablet 11     omeprazole (PRILOSEC) 20 MG DR capsule TAKE ONE CAPSULE BY MOUTH DAILY (Patient taking differently: Take 20 mg by mouth daily TAKE ONE CAPSULE BY MOUTH DAILY) 90 capsule 3     rosuvastatin (CRESTOR) 40 MG tablet Take 1 tablet (40 mg) by mouth At Bedtime 90 tablet 2       ALLERGIES     Allergies   Allergen Reactions     Contrast Dye Anaphylaxis     RASH, FACIAL AND NECK SWELLING, SOB, WHEEZING     Pantoprazole      Protonix caused diffuse edema     Chantix [Varenicline]      Terrible dreams     Penicillins Itching         Review of Systems:  Skin:  Negative     Eyes:  Positive for glasses  ENT:  Negative    Respiratory:  Positive for dyspnea on exertion  Cardiovascular:    lightheadedness;dizziness;Positive for;chest pain;palpitations;fatigue  Gastroenterology: Negative    Genitourinary:  Negative    Musculoskeletal:  Positive for neck pain  Neurologic:  Positive for headaches;migraine headaches  Psychiatric:  Positive for excessive stress  Heme/Lymph/Imm:       Endocrine:  Negative      Physical  "Exam:  Vitals: /67   Pulse 67   Ht 1.575 m (5' 2\")   Wt 49.9 kg (110 lb)   BMI 20.12 kg/m      Constitutional:  cooperative, alert and oriented, well developed, well nourished, in no acute distress        Skin:  warm and dry to the touch        Head:  normocephalic, no masses or lesions        Eyes:  pupils equal and round;conjunctivae and lids unremarkable;sclera white        ENT:  not assessed this visit        Neck:  not assessed this visit        Chest:  not assessed this visit        Cardiac: regular rhythm                  Abdomen:           Vascular: not assessed this visit                                      Extremities and Back:  no spinal abnormalities noted        Neurological:  no gross motor deficits            PAST MEDICAL HISTORY:  Past Medical History:   Diagnosis Date     Anxiety 12/7/2017     COPD (chronic obstructive pulmonary disease) (H)      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 clot     Post-menopausal      Reflux esophagitis 2/04    EGD: esophagitis and medium HH     Uncomplicated asthma      Vitamin C deficiency 8/12/2018       PAST SURGICAL HISTORY:  Past Surgical History:   Procedure Laterality Date     ANGIOGRAM       BYPASS GRAFT FEMOROPOPLITEAL Right 9/23/2020    Procedure: RIGHT FEMORAL GRAFT TO ABOVE-KNEE POPLITEAL BYPASS USING CADAVERIC SUPERFICIAL FEMORAL ARTERY;  Surgeon: Blake" Chadwick Osman MD;  Location:  OR     C FABRIC WRAPPING OF ABDOMINAL ANEURYSM       CARDIAC CATHERIZATION  9/3/2009    multivessel CAD without target lesions, med mgmt indicated, preserved ef     ENDARTERECTOMY CAROTID Right 5/11/2016    Procedure: ENDARTERECTOMY CAROTID;  Surgeon: Chadwick Lozano MD;  Location:  OR     ENDARTERECTOMY CAROTID Left 6/8/2020    Procedure: LEFT CAROTID ENDARTERECTOMY with distal facal vein patch  and EEG;  Surgeon: Chadwick Lozano MD;  Location:  OR     GYN SURGERY  left tube    left salpingectomy     LAPAROSCOPIC CHOLECYSTECTOMY N/A 9/27/2017    Procedure: LAPAROSCOPIC CHOLECYSTECTOMY;  LAPAROSCOPIC CHOLECYSTECTOMY;  Surgeon: Jacoby Tapia MD;  Location: [facility]     ORTHOPEDIC SURGERY      left knee surgery     VASCULAR SURGERY  aoto bi fem  left fem-AK pop     [facility] NONSPECIFIC PROCEDURE  12/00    angioplasty with stent right fem. a.     [facility] NONSPECIFIC PROCEDURE  1987    sinus surgery     [facility] NONSPECIFIC PROCEDURE  9/2/2009    Emergent left groin exploration with oversewing of bleeding angiographic site. 2. Endarterectomy of common femoral-proximal superficial femoral artery with greater saphenous vein patch angioplasty.   a. [place] of accessory right greater saphenous vein.      [facility] NONSPECIFIC PROCEDURE  8/27/2009    occluded left common iliac and external iliac arteries were successfully revascularized with stenting to 8 and 7 mm        FAMILY HISTORY:  Family History   Problem Relation Age of Onset     Cancer Mother         bladder     Cardiovascular Father         alive,multiple heart attacks     Diabetes Maternal Grandmother      Lung Cancer Maternal Grandmother      Blood Disease Brother         clotting disorder       SOCIAL HISTORY:  Social History     Socioeconomic History     Marital status:      Spouse name: None     Number of children: None     Years of education: None     Highest education level: None   Occupational History     None    Social Needs     Financial resource strain: None     Food insecurity     Worry: None     Inability: None     Transportation needs     Medical: None     Non-medical: None   Tobacco Use     Smoking status: Current Every Day Smoker     Packs/day: 0.25     Years: 40.00     Pack years: 10.00     Types: Cigarettes     Start date: 1958     Smokeless tobacco: Never Used     Tobacco comment: 1/2 PPD   Substance and Sexual Activity     Alcohol use: Yes     Alcohol/week: 0.0 standard drinks     Comment: x1-2 yr     Drug use: No     Sexual activity: Yes     Partners: Male     Birth control/protection: Surgical   Lifestyle     Physical activity     Days per week: None     Minutes per session: None     Stress: None   Relationships     Social connections     Talks on phone: None     Gets together: None     Attends [employer] service: None     Active member of club or organization: None     Attends meetings of clubs or organizations: None     Relationship status: None     Intimate partner violence     Fear of current or ex partner: None     Emotionally abused: None     Physically abused: None     Forced sexual activity: None   Other Topics Concern     Parent/sibling w/ CABG, MI or angioplasty before 65F 55M? Not Asked   Social History Narrative     None     Thank you for allowing me to participate in the care of your patient.      Sincerely,     Yanira Farah PA-C     [facility]    cc:   Jabari Huang MD  [address] [id number]

## 2021-05-17 ENCOUNTER — TELEPHONE (OUTPATIENT)
Dept: INTERNAL MEDICINE | Facility: CLINIC | Age: 61
End: 2021-05-17

## 2021-05-17 DIAGNOSIS — Z13.71 ENCOUNTER FOR NONPROCREATIVE GENETIC COUNSELING AND TESTING: ICD-10-CM

## 2021-05-17 DIAGNOSIS — G56.03 BILATERAL CARPAL TUNNEL SYNDROME: ICD-10-CM

## 2021-05-17 DIAGNOSIS — Z71.83 ENCOUNTER FOR NONPROCREATIVE GENETIC COUNSELING AND TESTING: ICD-10-CM

## 2021-05-17 DIAGNOSIS — G60.0 CHARCOT MARIE TOOTH MUSCULAR ATROPHY: ICD-10-CM

## 2021-05-17 NOTE — TELEPHONE ENCOUNTER
----- Message from Vasquez Benoit MD sent at 5/16/2021  3:44 PM CDT -----  Regarding: preop 5/13/21  Please print and fax preop from 5/13/2021 along with EKG and send to surgical center for upcoming surgery

## 2021-05-17 NOTE — PROGRESS NOTES
Pre Op Physical,ekg and labs manually faxed to [facility] [place] at [phone].

## 2021-05-19 ENCOUNTER — HOSPITAL ENCOUNTER (OUTPATIENT)
Dept: ULTRASOUND IMAGING | Facility: CLINIC | Age: 61
Discharge: HOME | End: 2021-05-19
Attending: SURGERY | Admitting: SURGERY
Payer: COMMERCIAL

## 2021-05-19 DIAGNOSIS — I73.9 PAD (PERIPHERAL ARTERY DISEASE) (H): ICD-10-CM

## 2021-05-19 PROCEDURE — 93926 LOWER EXTREMITY STUDY: CPT | Mod: RT

## 2021-05-21 ENCOUNTER — TELEPHONE (OUTPATIENT)
Dept: OTHER | Facility: CLINIC | Age: 61
End: 2021-05-21

## 2021-05-21 NOTE — TELEPHONE ENCOUNTER
[facility]    Shirley Hendricks has a history of an aortobifemoral bypass graft performed many years ago.  On 9/23/2020 she underwent a bypass from the right femoral limb of the aVF to above-knee popliteal artery using a cadaveric superficial femoral artery.  Did well following this procedure.    Last seen on 2/18/2021.  Excellent graft and DP pulse bilaterally. (Has old left femoral to popliteal in situ bypass graft).  Duplex at that time revealed a widely patent right cadaveric graft.  Somewhat elevated velocity of the distal anastomosis but no stenosis.    Ultrasound which was performed to the right graft per protocol.  This revealed some wall thrombus in the graft.  He had a new finding occlusion of the outflow above-knee popliteal artery.  Large collaterals keeping the graft patent.  This is obviously of concern.  I will check with interventional radiology about options for lytic therapy especially considering her aortofemoral graft.    Discussed with patient--left message on cell.      Chadwick Lozano MD

## 2021-05-23 ENCOUNTER — TELEPHONE (OUTPATIENT)
Dept: OTHER | Facility: CLINIC | Age: 61
End: 2021-05-23

## 2021-05-23 NOTE — TELEPHONE ENCOUNTER
[place] [facility]    I was finally able to talk to Shirley Hendricks this morning.  She needs an Angio and Lytic therapy for her right Femoral (ABF limb) to AK popliteal cadaveric bypass.  Duplex with wall thrombus and occluded distal popliteal artery --open via side branch.      I spoke with Dr Chou of IR (he is aware of the situation but we were not able to reach the patient till today).  We will arrange for the Angio.       Chadwick Lozano

## 2021-05-24 ENCOUNTER — TELEPHONE (OUTPATIENT)
Dept: OTHER | Facility: CLINIC | Age: 61
End: 2021-05-24

## 2021-05-24 DIAGNOSIS — I73.9 PAD (PERIPHERAL ARTERY DISEASE) (H): Primary | ICD-10-CM

## 2021-05-24 DIAGNOSIS — Z91.041 HX OF ALLERGY TO RADIOGRAPHIC CONTRAST MEDIA: ICD-10-CM

## 2021-05-24 DIAGNOSIS — I70.229 CRITICAL LOWER LIMB ISCHEMIA (H): ICD-10-CM

## 2021-05-24 RX ORDER — DIPHENHYDRAMINE HCL 25 MG
TABLET ORAL
Qty: 2 TABLET | Refills: 0 | Status: SHIPPED | OUTPATIENT
Start: 2021-05-24 | End: 2021-07-06

## 2021-05-24 RX ORDER — METHYLPREDNISOLONE 16 MG/1
TABLET ORAL
Qty: 4 TABLET | Refills: 0 | Status: SHIPPED | OUTPATIENT
Start: 2021-05-24 | End: 2021-06-17

## 2021-05-24 NOTE — TELEPHONE ENCOUNTER
Per Surgery scheduler and IR, pt angiogram to be scheduled tomorrow due to availability, I updated Dr. Lozano of this and he is aware.     ESTHER JohnsonN, RN  [facility]  Office:  [phone] Fax: [phone]

## 2021-05-24 NOTE — PROGRESS NOTES
Called pt and explained pre contrast allergy  medication. Pt notes understanding.   Pretreatment for Contrast Allergy:  ? Medrol 32 mg tablet, Take one tablet 12 hours before procedure:  _____________  ? Medrol 32 mg tablet, Take one tablet 2 hours before procedure:  _______________  ? If you have had an allergic reaction (hives) after being pre-treated with Medrol, you will also need to take Benadryl 25-50 mg; Take once 30 minutes-2 hours before procedure: _____  ESTHER JohnsonN, RN

## 2021-05-24 NOTE — TELEPHONE ENCOUNTER
Patient daughter requesting for nurse to call to give more details for her to get more knowledge of procedure happening tomorrow. Patient daughter was frustrated and requesting for nurse to call her right away.     Informed will send message to nurse to call back.       Jaylin DOLAN

## 2021-05-24 NOTE — TELEPHONE ENCOUNTER
Contacted daughter Malu to review instructions.  Patient to arrive at 12:00 pm at the [facility] welcome desk.  NPO after 9:30 am.  Patient to hold PLAVIX.    Pre-medication for contrast allergy reviewed.    COVID test unable to be done due urgency of procedure.  Discussed, she can one visitor.  Discussed patient will likely be admitted.    Nelli Arizmendi RN  IR nurse clinician  [phone]

## 2021-05-25 ENCOUNTER — APPOINTMENT (OUTPATIENT)
Dept: INTERVENTIONAL RADIOLOGY/VASCULAR | Facility: CLINIC | Age: 61
End: 2021-05-25
Attending: SURGERY
Payer: COMMERCIAL

## 2021-05-25 ENCOUNTER — HOSPITAL ENCOUNTER (OUTPATIENT)
Facility: CLINIC | Age: 61
Discharge: HOME | End: 2021-05-26
Attending: RADIOLOGY | Admitting: RADIOLOGY
Payer: COMMERCIAL

## 2021-05-25 DIAGNOSIS — I70.229 CRITICAL LOWER LIMB ISCHEMIA (H): ICD-10-CM

## 2021-05-25 DIAGNOSIS — F17.200 TOBACCO USE DISORDER: ICD-10-CM

## 2021-05-25 DIAGNOSIS — I73.9 PAD (PERIPHERAL ARTERY DISEASE) (H): Primary | ICD-10-CM

## 2021-05-25 LAB
ERYTHROCYTE [DISTWIDTH] IN BLOOD BY AUTOMATED COUNT: 12.5 % (ref 10–15)
HCT VFR BLD AUTO: 35.4 % (ref 35–47)
HGB BLD-MCNC: 11.9 G/DL (ref 11.7–15.7)
MCH RBC QN AUTO: 31 PG (ref 26.5–33)
MCHC RBC AUTO-ENTMCNC: 33.6 G/DL (ref 31.5–36.5)
MCV RBC AUTO: 92 FL (ref 78–100)
PLATELET # BLD AUTO: 122 10E9/L (ref 150–450)
RBC # BLD AUTO: 3.84 10E12/L (ref 3.8–5.2)
WBC # BLD AUTO: 7.8 10E9/L (ref 4–11)

## 2021-05-25 PROCEDURE — 99223 1ST HOSP IP/OBS HIGH 75: CPT | Performed by: INTERNAL MEDICINE

## 2021-05-25 PROCEDURE — 250N000013 HC RX MED GY IP 250 OP 250 PS 637: Performed by: INTERNAL MEDICINE

## 2021-05-25 PROCEDURE — 272N000196 HC ACCESSORY CR5

## 2021-05-25 PROCEDURE — 250N000013 HC RX MED GY IP 250 OP 250 PS 637: Performed by: NURSE PRACTITIONER

## 2021-05-25 PROCEDURE — 75710 ARTERY X-RAYS ARM/LEG: CPT | Mod: XU,RT

## 2021-05-25 PROCEDURE — 250N000011 HC RX IP 250 OP 636: Performed by: NURSE PRACTITIONER

## 2021-05-25 PROCEDURE — 250N000009 HC RX 250: Performed by: RADIOLOGY

## 2021-05-25 PROCEDURE — 250N000013 HC RX MED GY IP 250 OP 250 PS 637

## 2021-05-25 PROCEDURE — C1757 CATH, THROMBECTOMY/EMBOLECT: HCPCS

## 2021-05-25 PROCEDURE — 272N000566 HC SHEATH CR3

## 2021-05-25 PROCEDURE — 250N000009 HC RX 250: Performed by: NURSE PRACTITIONER

## 2021-05-25 PROCEDURE — 99221 1ST HOSP IP/OBS SF/LOW 40: CPT | Performed by: SURGERY

## 2021-05-25 PROCEDURE — C1769 GUIDE WIRE: HCPCS

## 2021-05-25 PROCEDURE — C1887 CATHETER, GUIDING: HCPCS

## 2021-05-25 PROCEDURE — 272N000116 HC CATH CR1

## 2021-05-25 PROCEDURE — 999N000128 HC STATISTIC PERIPHERAL IV START W/O US GUIDANCE

## 2021-05-25 PROCEDURE — 258N000003 HC RX IP 258 OP 636: Performed by: RADIOLOGY

## 2021-05-25 PROCEDURE — 999N000012 HC STATISTIC ANGIOGRAM, STENT, VERTEBRO PLASTY

## 2021-05-25 PROCEDURE — 255N000002 HC RX 255 OP 636: Performed by: RADIOLOGY

## 2021-05-25 PROCEDURE — 36415 COLL VENOUS BLD VENIPUNCTURE: CPT | Performed by: RADIOLOGY

## 2021-05-25 PROCEDURE — 36415 COLL VENOUS BLD VENIPUNCTURE: CPT

## 2021-05-25 PROCEDURE — 272N000302 HC DEVICE INFLATION CR5

## 2021-05-25 PROCEDURE — 272N000567 HC SHEATH CR4

## 2021-05-25 PROCEDURE — 250N000011 HC RX IP 250 OP 636: Performed by: RADIOLOGY

## 2021-05-25 PROCEDURE — 85027 COMPLETE CBC AUTOMATED: CPT | Performed by: RADIOLOGY

## 2021-05-25 PROCEDURE — 272N000190 HC ACCESSORY CR14

## 2021-05-25 PROCEDURE — 37224 HC REVASCULARIZE FEM-POP ARTERY ANGIOPLASTY: CPT

## 2021-05-25 RX ORDER — FLUMAZENIL 0.1 MG/ML
0.2 INJECTION, SOLUTION INTRAVENOUS
Status: DISCONTINUED | OUTPATIENT
Start: 2021-05-25 | End: 2021-05-25 | Stop reason: HOSPADM

## 2021-05-25 RX ORDER — NICOTINE 21 MG/24HR
1 PATCH, TRANSDERMAL 24 HOURS TRANSDERMAL EVERY 24 HOURS
Qty: 30 PATCH | Refills: 1 | Status: SHIPPED | OUTPATIENT
Start: 2021-05-25 | End: 2022-10-13

## 2021-05-25 RX ORDER — HEPARIN SODIUM 1000 [USP'U]/ML
4000 INJECTION, SOLUTION INTRAVENOUS; SUBCUTANEOUS ONCE
Status: COMPLETED | OUTPATIENT
Start: 2021-05-25 | End: 2021-05-25

## 2021-05-25 RX ORDER — NICOTINE 21 MG/24HR
1 PATCH, TRANSDERMAL 24 HOURS TRANSDERMAL DAILY
Status: DISCONTINUED | OUTPATIENT
Start: 2021-05-25 | End: 2021-05-26 | Stop reason: HOSPADM

## 2021-05-25 RX ORDER — HEPARIN SODIUM 200 [USP'U]/100ML
1 INJECTION, SOLUTION INTRAVENOUS CONTINUOUS PRN
Status: DISCONTINUED | OUTPATIENT
Start: 2021-05-25 | End: 2021-05-25 | Stop reason: HOSPADM

## 2021-05-25 RX ORDER — CLOPIDOGREL BISULFATE 75 MG/1
75 TABLET ORAL DAILY
Status: DISCONTINUED | OUTPATIENT
Start: 2021-05-25 | End: 2021-05-26 | Stop reason: HOSPADM

## 2021-05-25 RX ORDER — ROSUVASTATIN CALCIUM 20 MG/1
40 TABLET, COATED ORAL EVERY EVENING
Status: DISCONTINUED | OUTPATIENT
Start: 2021-05-25 | End: 2021-05-26 | Stop reason: HOSPADM

## 2021-05-25 RX ORDER — SODIUM CHLORIDE 9 MG/ML
INJECTION, SOLUTION INTRAVENOUS CONTINUOUS
Status: DISCONTINUED | OUTPATIENT
Start: 2021-05-25 | End: 2021-05-25 | Stop reason: HOSPADM

## 2021-05-25 RX ORDER — AMLODIPINE BESYLATE 5 MG/1
5 TABLET ORAL DAILY
Status: DISCONTINUED | OUTPATIENT
Start: 2021-05-26 | End: 2021-05-26 | Stop reason: HOSPADM

## 2021-05-25 RX ORDER — HEPARIN SODIUM 10000 [USP'U]/100ML
0-5000 INJECTION, SOLUTION INTRAVENOUS CONTINUOUS
Status: DISCONTINUED | OUTPATIENT
Start: 2021-05-25 | End: 2021-05-26 | Stop reason: HOSPADM

## 2021-05-25 RX ORDER — CARVEDILOL 6.25 MG/1
6.25 TABLET ORAL 2 TIMES DAILY WITH MEALS
Status: DISCONTINUED | OUTPATIENT
Start: 2021-05-25 | End: 2021-05-26 | Stop reason: HOSPADM

## 2021-05-25 RX ORDER — BUPROPION HYDROCHLORIDE 150 MG/1
150 TABLET, FILM COATED, EXTENDED RELEASE ORAL 2 TIMES DAILY
Qty: 60 TABLET | Refills: 11 | Status: SHIPPED | OUTPATIENT
Start: 2021-05-25 | End: 2021-07-06

## 2021-05-25 RX ORDER — PROTAMINE SULFATE 10 MG/ML
10 INJECTION, SOLUTION INTRAVENOUS ONCE
Status: COMPLETED | OUTPATIENT
Start: 2021-05-25 | End: 2021-05-25

## 2021-05-25 RX ORDER — FENTANYL CITRATE 50 UG/ML
25-50 INJECTION, SOLUTION INTRAMUSCULAR; INTRAVENOUS EVERY 5 MIN PRN
Status: DISCONTINUED | OUTPATIENT
Start: 2021-05-25 | End: 2021-05-25 | Stop reason: HOSPADM

## 2021-05-25 RX ORDER — LIDOCAINE 40 MG/G
CREAM TOPICAL
Status: DISCONTINUED | OUTPATIENT
Start: 2021-05-25 | End: 2021-05-25 | Stop reason: HOSPADM

## 2021-05-25 RX ORDER — NALOXONE HYDROCHLORIDE 0.4 MG/ML
0.4 INJECTION, SOLUTION INTRAMUSCULAR; INTRAVENOUS; SUBCUTANEOUS
Status: DISCONTINUED | OUTPATIENT
Start: 2021-05-25 | End: 2021-05-25 | Stop reason: HOSPADM

## 2021-05-25 RX ORDER — CLINDAMYCIN PHOSPHATE 900 MG/50ML
900 INJECTION, SOLUTION INTRAVENOUS ONCE
Status: COMPLETED | OUTPATIENT
Start: 2021-05-25 | End: 2021-05-25

## 2021-05-25 RX ORDER — NALOXONE HYDROCHLORIDE 0.4 MG/ML
0.2 INJECTION, SOLUTION INTRAMUSCULAR; INTRAVENOUS; SUBCUTANEOUS
Status: DISCONTINUED | OUTPATIENT
Start: 2021-05-25 | End: 2021-05-25 | Stop reason: HOSPADM

## 2021-05-25 RX ORDER — IODIXANOL 320 MG/ML
150 INJECTION, SOLUTION INTRAVASCULAR ONCE
Status: COMPLETED | OUTPATIENT
Start: 2021-05-25 | End: 2021-05-25

## 2021-05-25 RX ORDER — LISINOPRIL 20 MG/1
20 TABLET ORAL 2 TIMES DAILY
Status: DISCONTINUED | OUTPATIENT
Start: 2021-05-25 | End: 2021-05-26 | Stop reason: HOSPADM

## 2021-05-25 RX ORDER — ACETAMINOPHEN 500 MG
500 TABLET ORAL EVERY 6 HOURS PRN
Status: DISCONTINUED | OUTPATIENT
Start: 2021-05-25 | End: 2021-05-26 | Stop reason: HOSPADM

## 2021-05-25 RX ORDER — SODIUM CHLORIDE 9 MG/ML
INJECTION, SOLUTION INTRAVENOUS CONTINUOUS
Status: DISCONTINUED | OUTPATIENT
Start: 2021-05-25 | End: 2021-05-26 | Stop reason: HOSPADM

## 2021-05-25 RX ADMIN — ACETAMINOPHEN 500 MG: 500 TABLET, FILM COATED ORAL at 21:10

## 2021-05-25 RX ADMIN — CLINDAMYCIN PHOSPHATE 900 MG: 900 INJECTION, SOLUTION INTRAVENOUS at 14:48

## 2021-05-25 RX ADMIN — HEPARIN SODIUM 4000 UNITS: 1000 INJECTION INTRAVENOUS; SUBCUTANEOUS at 15:53

## 2021-05-25 RX ADMIN — FENTANYL CITRATE 25 MCG: 50 INJECTION, SOLUTION INTRAMUSCULAR; INTRAVENOUS at 15:54

## 2021-05-25 RX ADMIN — MIDAZOLAM HYDROCHLORIDE 0.5 MG: 1 INJECTION, SOLUTION INTRAMUSCULAR; INTRAVENOUS at 15:20

## 2021-05-25 RX ADMIN — CLOPIDOGREL BISULFATE 75 MG: 75 TABLET ORAL at 19:46

## 2021-05-25 RX ADMIN — ROSUVASTATIN CALCIUM 40 MG: 20 TABLET, FILM COATED ORAL at 20:55

## 2021-05-25 RX ADMIN — HEPARIN SODIUM 600 UNITS/HR: 10000 INJECTION, SOLUTION INTRAVENOUS at 19:45

## 2021-05-25 RX ADMIN — MIDAZOLAM HYDROCHLORIDE 0.5 MG: 1 INJECTION, SOLUTION INTRAMUSCULAR; INTRAVENOUS at 15:28

## 2021-05-25 RX ADMIN — LIDOCAINE HYDROCHLORIDE 4 ML: 10 INJECTION, SOLUTION INFILTRATION; PERINEURAL at 15:34

## 2021-05-25 RX ADMIN — MIDAZOLAM HYDROCHLORIDE 0.5 MG: 1 INJECTION, SOLUTION INTRAMUSCULAR; INTRAVENOUS at 15:54

## 2021-05-25 RX ADMIN — FENTANYL CITRATE 25 MCG: 50 INJECTION, SOLUTION INTRAMUSCULAR; INTRAVENOUS at 15:28

## 2021-05-25 RX ADMIN — SODIUM CHLORIDE: 9 INJECTION, SOLUTION INTRAVENOUS at 14:00

## 2021-05-25 RX ADMIN — IODIXANOL 85 ML: 320 INJECTION, SOLUTION INTRAVASCULAR at 16:45

## 2021-05-25 RX ADMIN — NICOTINE 1 PATCH: 21 PATCH, EXTENDED RELEASE TRANSDERMAL at 20:54

## 2021-05-25 RX ADMIN — HEPARIN SODIUM 3 BAG: 200 INJECTION, SOLUTION INTRAVENOUS at 15:54

## 2021-05-25 RX ADMIN — PROTAMINE SULFATE 10 MG: 10 INJECTION, SOLUTION INTRAVENOUS at 16:32

## 2021-05-25 RX ADMIN — FENTANYL CITRATE 25 MCG: 50 INJECTION, SOLUTION INTRAMUSCULAR; INTRAVENOUS at 15:20

## 2021-05-25 RX ADMIN — LISINOPRIL 20 MG: 20 TABLET ORAL at 20:55

## 2021-05-25 RX ADMIN — CARVEDILOL 6.25 MG: 6.25 TABLET, FILM COATED ORAL at 20:55

## 2021-05-25 ASSESSMENT — MIFFLIN-ST. JEOR: SCORE: 1000.87

## 2021-05-25 NOTE — PRE-PROCEDURE
GENERAL PRE-PROCEDURE:   Procedure:  Right leg angiogram with possible thrombectomy, catheter directed lytics, angioplasty, stent placement with intravenous moderate sedation   Date/Time:  5/25/2021 1:50 PM    Written consent obtained?: Yes    Risks and benefits: Risks, benefits and alternatives were discussed    Consent given by:  Patient  Patient states understanding of procedure being performed: Yes    Patient's understanding of procedure matches consent: Yes    Procedure consent matches procedure scheduled: Yes    Expected level of sedation:  Moderate  Appropriately NPO:  Yes  ASA Class:  Class 3- Severe systemic disease, definite functional limitations  Mallampati  :  Grade 1- soft palate, uvula, tonsillar pillars, and posterior pharyngeal wall visible  Lungs:  Lungs clear with good breath sounds bilaterally  Heart:  Normal heart sounds and rate  History & Physical reviewed:  History and physical reviewed and no updates needed  Statement of review:  I have reviewed the lab findings, diagnostic data, medications, and the plan for sedation    Shirley has a contrast allergy and has taken the Medrol and oral Benadryl pills as directed. IV Benadryl will also be ordered for the procedure.     [facility] Interventional Radiology CNP ([phone]) (phone [phone])

## 2021-05-25 NOTE — CONSULTS
[facility]    Vascular Medicine Consultation     Date of Admission:  5/25/2021  Date of Consult (When I saw the patient): 05/25/21         Physician Supervisory Attestation:   I have reviewed and discussed with the physician assistant their history, physical and plan and independently interviewed and examined Shirley Hendricks and agree with the plan as stated in the physician assistant note.    We were  asked by Dr. Lozano to evaluate vascular risk factors and assist with medical management in this 60 year old female smoker with hyperlipidemia, hypertension, carotid disease and PAD undergoing a right lower extremity angiogram with possible lysis in an attempt to reopen the native right popliteal artery.      Last dose of Plavix was on 5/23/2021.  Post-procedure cares will be per Dr. Lozano / Vascular Surgery / IR.   She has been on Plavix as an outpatient ( stopped 2 days ago ) hold if she undergoes lysis  She must stop smoking, see below assist with Nicoderm  21 mg patches and Wellbutrin 150 daily for 3 days then bid   Continue PTA BP meds, crestor etc  Check FLP      Thank you for the consultation !    Copy to Dr. Lozano , primary MD and IR Physician       Beth Jason MD , FA, [facility]  Vascular Medicine  Clinical Lipidologist   Clinical Hypertension specialist   5/25/2021          Assessment & Plan     1.  Peripheral arterial disease with history of aortobifemoral bypass graft and left femoral-popliteal bypass graft in past and more recently right femoral graft limb to above-knee popliteal cadaveric superficial femoral artery bypass graft 9/23/20 now presenting with thrombosis of above-knee popliteal artery      She now presents for an angiogram with possible lysis in an attempt to reopen the popliteal artery. Post-procedure cares will be per Dr. Lozano / Vascular Surgery / IR. She has been on Plavix as an outpatient. She must stop smoking.      2. Symptomatic high grade left  carotid artery stenosis s/p left carotid endarterectomy 6/8/20 and prior left carotid endarterectomy in the remote past.     She has recovered well from this surgery. She does have some new moderate restenosis to 60% on the right post right carotid CEA in the past and 50-69% stenosis on the left as of 4/2021. She is currently asymptomatic. This will be followed as an outpatient by Dr. Lozano.      3. Hyperlipidemia     Her lipid panel this admission is pending. She had been well controlled in the past on Crestor 40 mg daily. She should continue the same.      4. Hypertension     Continue prior to admission amlodipine, carvedilol, and lisinopril and monitor blood pressure closely.      5. Ongoing tobacco use     She admits to ongoing tobacco use, smoking 1/2 pack per day. She feels she has too much stress at home to successfully quit. The importance of tobacco cessation was stressed. She is willing to try Wellbutrin in combination with nicotine patches to assist with cessation. These will be prescribed for her.     Reason for Consult   Reason for consult: Asked by Dr. Lozano to evaluate vascular risk factors and assist with medical management in this 60 year old female smoker with hyperlipidemia, hypertension, carotid disease and PAD undergoing a right lower extremity angiogram with possible lysis in an attempt to reopen the native right popliteal artery.      Primary Care Physician   Vasquez Benoit      History of Present Illness   Shirley Hendricks is a 60 year old female smoker normally followed by Dr. Narvaez who has a longstanding relationship with Dr. Chadwick Lozano of Vascular Surgery due to her prominent peripheral arterial disease and carotid disease.  She has previously had problems primarily with iliac occlusive disease and underwent angioplasty and stenting of her right common iliac artery in 2000.  Her right external iliac artery occluded in 03/2004, and she underwent recanalization stenting at that  time. In 2010 she developed worsening claudication symptoms in her left leg and due to the very small size of her diseased superficial femoral artery, the patient was taken to the operating room for a left common femoral artery to above-knee popliteal bypass with right greater saphenous vein as conduit.        Unfortunately she continued to smoke. In 6/2016 she underwent a right carotid endarterectomy for symptomatic stenosis followed more recently by a left carotid endarterectomy on 6/8/20 for symptomatic left carotid artery stenosis. She has now recovered from this.      She continued to have worsening right lower extremity claudication. She had known right SFA occlusion. On 9/23/20 she underwent a right aortobifemoral graft limb to above knee popliteal bypass using cadaveric SFA. On follow-up surveillance imaging she was noted to have occlusion of the native popliteal artery with some wall thrombus in the graft. Collaterals were keeping the graft patent. She did admit to some recurrent claudication symptoms in the right calf. An angiogram with possible lysis was recommended, for which she presented today.        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 clot     Post-menopausal      Reflux esophagitis 02/2004    EGD: esophagitis and medium HH     SVT (supraventricular tachycardia) (H)      Thrombocytopenia (H)      Uncomplicated asthma      Vitamin C deficiency 08/12/2018       Past Surgical History   Past Surgical History:   Procedure Laterality Date     ANGIOGRAM       BYPASS GRAFT FEMOROPOPLITEAL Right 9/23/2020    Procedure: RIGHT FEMORAL GRAFT TO ABOVE-KNEE POPLITEAL BYPASS USING CADAVERIC SUPERFICIAL FEMORAL ARTERY;  Surgeon: Chadwick Lozano MD;  Location:  OR     C FABRIC WRAPPING OF ABDOMINAL ANEURYSM       CARDIAC CATHERIZATION  9/3/2009    multivessel CAD without target lesions, med mgmt indicated, preserved ef     ENDARTERECTOMY CAROTID Right 5/11/2016    Procedure: ENDARTERECTOMY CAROTID;  Surgeon: Chadwick Lozano MD;  Location:  OR     ENDARTERECTOMY CAROTID Left 6/8/2020    Procedure: LEFT CAROTID ENDARTERECTOMY with distal facal vein patch  and EEG;  Surgeon: Chadwick Lozano MD;  Location:  OR     GYN SURGERY  left tube    left salpingectomy     LAPAROSCOPIC CHOLECYSTECTOMY N/A 9/27/2017    Procedure: LAPAROSCOPIC CHOLECYSTECTOMY;  LAPAROSCOPIC CHOLECYSTECTOMY;  Surgeon: Jacoby Tapia MD;  Location: [facility]     ORTHOPEDIC SURGERY      left knee surgery     VASCULAR SURGERY  aoto bi fem  left fem-AK pop     [facility] NONSPECIFIC PROCEDURE  12/00    angioplasty with stent right fem. a.     [facility] NONSPECIFIC PROCEDURE  1987    sinus surgery     [facility] NONSPECIFIC PROCEDURE  9/2/2009    Emergent left groin exploration with oversewing of bleeding angiographic site. 2. Endarterectomy of common femoral-proximal superficial femoral artery with greater saphenous vein patch angioplasty.   a. [place] of accessory right greater saphenous vein.      [facility] NONSPECIFIC PROCEDURE  8/27/2009    occluded left common iliac and external iliac arteries were successfully revascularized with stenting to 8  and 7 mm        Prior to Admission Medications   Prior to Admission Medications   Prescriptions Last Dose Informant Patient Reported? Taking?   BREO ELLIPTA 200-25 MCG/INH Inhaler 5/25/2021 at Unknown time Self No Yes   Sig: Inhale 1 puff into the lungs daily   Patient taking differently: Inhale 1 puff into the lungs daily    CALCIUM 600-D 600-400 MG-UNIT TABS 5/24/2021 at Unknown time  No Yes   Sig: Take 1 tablet by mouth 2 times daily   CALCIUM PO Unknown at Unknown time Self Yes No   Sig: Take 1 tablet by mouth every evening   acetaminophen (TYLENOL) 500 MG tablet 5/24/2021 at Unknown time Self Yes Yes   Sig: Take 500-1,000 mg by mouth every 6 hours as needed for mild pain   amLODIPine (NORVASC) 5 MG tablet 5/25/2021 at Unknown time Self No Yes   Sig: Take 1 tablet (5 mg) by mouth 2 times daily   carvedilol (COREG) 6.25 MG tablet 5/25/2021 at Unknown time  No Yes   Sig: TAKE ONE TABLET BY MOUTH TWICE A DAY WITH MEALS    clopidogrel (PLAVIX) 75 MG tablet Past Week at Unknown time  No Yes   Sig: Take 1 tablet (75 mg) by mouth daily   denosumab (PROLIA) 60 MG/ML SOLN injection More than a month at Unknown time Self No No   Sig: Inject 1 mL (60 mg) Subcutaneous every 6 months INDICATION: TO TREAT OSTEOPOROSIS   diphenhydrAMINE (BENADRYL) 25 MG tablet 5/25/2021 at Unknown time  No Yes   Sig: Take 25 to 50mg once 30 minutes to 2 hours before procedure.   fluticasone (FLONASE) 50 MCG/ACT nasal spray Unknown at Unknown time  No No   Sig: Spray 1 spray into both nostrils daily   lisinopril (ZESTRIL) 20 MG tablet 5/25/2021 at Unknown time  No Yes   Sig: Take 1 tablet (20 mg) by mouth 2 times daily.   methylPREDNISolone (MEDROL) 16 MG tablet 5/25/2021 at Unknown time  No Yes   Sig: Take 32 mg by mouth 12 hours before the procedure and repeat 32 mg by mouth 2 hours before the procedure to prevent contrast reaction.   nitroGLYcerin (NITROSTAT) 0.4 MG sublingual tablet Unknown at Unknown time  No No   Sig: Place 1 tablet (0.4  mg) under the tongue See Admin Instructions for chest pain   omeprazole (PRILOSEC) 20 MG DR capsule 5/25/2021 at Unknown time Self No Yes   Sig: TAKE ONE CAPSULE BY MOUTH DAILY   Patient taking differently: Take 20 mg by mouth daily TAKE ONE CAPSULE BY MOUTH DAILY   rosuvastatin (CRESTOR) 40 MG tablet 5/24/2021 at Unknown time  No Yes   Sig: Take 1 tablet (40 mg) by mouth At Bedtime      Facility-Administered Medications: None     Allergies   Allergies   Allergen Reactions     Contrast Dye Anaphylaxis     RASH, FACIAL AND NECK SWELLING, SOB, WHEEZING     Pantoprazole      Protonix caused diffuse edema     Chantix [Varenicline]      Terrible dreams     Penicillins Itching       Social History   Shirley Hendricks  reports that she has been smoking cigarettes. She started smoking about 60 years ago. She has a 10.00 pack-year smoking history. She has never used smokeless tobacco. She reports current alcohol use. She reports that she does not use drugs.    Family History   Family History   Problem Relation Age of Onset     Cancer Mother         bladder     Cardiovascular Father         alive,multiple heart attacks     Diabetes Maternal Grandmother      Lung Cancer Maternal Grandmother      Blood Disease Brother         clotting disorder       Review of Systems   The 10 point Review of Systems is negative other than noted in the HPI or here.     Physical Exam   Temp: 97.8  F (36.6  C) Temp src: Oral BP: (!) 138/90 Pulse: 64   Resp: 16 SpO2: 96 % O2 Device: None (Room air)    Vital Signs with Ranges  Temp:  [97.8  F (36.6  C)] 97.8  F (36.6  C)  Pulse:  [64] 64  Resp:  [16] 16  BP: (131-138)/(82-90) 138/90  SpO2:  [96 %] 96 %  107 lbs 8 oz    Constitutional: awake, alert, cooperative, no apparent distress, and appears stated age  Eyes: Lids and lashes normal, pupils equal, round and reactive to light, extra ocular muscles intact, sclera clear, conjunctiva normal  ENT: normocepalic, without obvious abnormality,  oropharynx pink and moist  Hematologic / Lymphatic: no lymphadenopathy  Respiratory: No increased work of breathing, good air exchange, clear to auscultation bilaterally, no crackles or wheezing  Cardiovascular: regular rate and rhythm, normal S1 and S2 and no murmur noted  GI: Normal bowel sounds, soft, non-distended, non-tender  Skin: no redness, warmth, or swelling, no rashes. Wound on right wrist from carpal tunnel surgery.  Calluses noted on both feet.   Musculoskeletal: There is no redness, warmth, or swelling of the joints.  Full range of motion noted.  Motor strength is 5 out of 5 all extremities bilaterally.  Tone is normal. High arches both feet.   Neurologic: Awake, alert, oriented to name, place and time.  Cranial nerves II-XII are grossly intact.  Motor is 5 out of 5 bilaterally.    Neuropsychiatric:  Normal affect, memory, insight.  Pulses: Palpable left pedal pulses. Unable to palpate right pedal pulses No carotid bruits appreciated.     Data   Most Recent 3 CBC's:  Recent Labs   Lab Test 05/13/21  1832 01/05/21  1119 09/25/20  0807   WBC 5.7 5.9 4.3   HGB 11.9 12.8 12.8   MCV 95 95 95   * 133* 158     Most Recent 3 BMP's:  Recent Labs   Lab Test 01/05/21  1119 09/25/20  0807 09/24/20  0739    139 133   POTASSIUM 4.4 3.6 4.1   CHLORIDE 104 107 105   CO2 27 28 25   BUN 11 8 13   CR 0.58 0.53 0.47*   ANIONGAP 3 4 3   SONIA 9.0 8.9 8.3*   GLC 89 90 82     Most Recent 3 INR's:  Recent Labs   Lab Test 09/24/20  0739 05/10/16  1208 04/21/16  1400   INR 1.01 0.95 1.02     Most Recent Cholesterol Panel:  Recent Labs   Lab Test 09/23/20  0844   CHOL 127   LDL 62   HDL 48*   TRIG 83     Most Recent Hemoglobin A1c:  Recent Labs   Lab Test 09/23/20  0844   A1C 5.4

## 2021-05-25 NOTE — IR NOTE
Interventional Radiology Intra-procedural Nursing Note     Patient Name:  Shirley Hendricks    Medical Record Number: [id number]  Today's Date:  May 25, 2021  Procedure: Right Leg Angiogram  Start Time: 1533  End of procedure time: 1607    Report given to: SANTINO Niño RN  Time pt departs:  1658       Notes:      Patient brought into IR room # 1 at 1511.      Informed consent obtained by Dr. Lynnette MD.         Allergies   Allergen Reactions     Contrast Dye Anaphylaxis     RASH, FACIAL AND NECK SWELLING, SOB, WHEEZING     Pantoprazole      Protonix caused diffuse edema     Chantix [Varenicline]      Terrible dreams     Penicillins Itching       Labs reviewed:  No results found for this or any previous visit (from the past 24 hour(s)).    Peripheral pulses checked and documented in Epic Flowsheet.     Patient prepped with 2% Chlorhexidine and draped in supine position.  VSS.  Monitor reads NSR with rate in the 60's.      MD first needle stick time was 1525. A total of 4 ml of 1% lidocaine was used locally at the puncture site.    A 6Fr arterial sheath was placed at 1541    Debrief was completed by Dr. Lynnette MD.       Patient received a total of 1.5 mg Versed and 75 mcg fentanyl for sedation during the procedure. The last dose was given at 1554.      The patient tolerated the procedure well.     A 6Fr arterial sheath was removed at 1637 and pressure was held by Carmen.  The site is C/D/I at procedure end without hematoma.       Juan M Lazaro RN

## 2021-05-25 NOTE — PROGRESS NOTES
Presently Hgb back at 11.9  VSS  Groin site NOW dry and intact   No hematoma No oozing   Pt denies any pain   daughter at bedside  Dr Church informed of the above

## 2021-05-25 NOTE — PROGRESS NOTES
Care Suites Admission Nursing Note    Patient Information  Name: Shirley Hendricks  Age: 60 year old  Reason for admission: LE angiogram  Care Suites arrival time: 1225      Patient Admission/Assessment   Pre-procedure assessment complete: Yes  If abnormal assessment/labs, provider notified: N/A  NPO: Yes  Medications held per instructions/orders: Yes, plavix.  Consent: deferred  If applicable, pregnancy test status: deferred  Patient oriented to room: Yes  Education/questions answered: Yes  Plan/other: proceed as scheduled/planned. Pt premedicated with medrol and benadryl for contrast dye allergy.    Discharge Planning  Discharge name/phone number: Miriam [phone]  Overnight post sedation caregiver: overnight at hospital or spouse at home  Discharge location: home or possible overnight for observation.    Trina Rivera RN     PATIENT/VISITOR WELLNESS SCREENING    Step 1 Patient Screening    1. In the last month, have you been in contact with someone who was confirmed or suspected to have Coronavirus/COVID-19? No    2. Do you have the following symptoms?  Fever/Chills? No   Cough? No   Shortness of breath? No   New loss of taste or smell? No  Sore throat? No  Muscle or body aches? No  Headaches? No  Fatigue? No  Vomiting or diarrhea? No

## 2021-05-25 NOTE — PROGRESS NOTES
1720 Pt reports bleeding from right groin puncture site. Large amt blood in bedding. Manual pressure applied. IR staff & Dr Church notified.   1725 VSS. Pt c/o severe burning pain to right heel. Right heel elevated up off bed.  1740 Manual pressure x 20 min. Site soft & flat. Gauze drsg applied.  1745 Pt's daughter at bedside. Detailed update given.

## 2021-05-25 NOTE — PLAN OF CARE
Vascular Surgery Update:    IR able to open graft, will plan on resuming plavix tonight, keep on heparin overnight, potential discharge tomorrow.    Lázaro Pearce MD  General Surgery PGY4

## 2021-05-25 NOTE — PROGRESS NOTES
VASCULAR SURGERY    Shirley Hendricks presents today for right leg angiogram with Dr. Mckeon.  She has a history of PAD.  She has a very old aortobifemoral bypass graft and left femoral to popliteal in situ bypass graft is all functioning well.  Last year she underwent a right femoral graft limb to above-knee popliteal cadaveric SFA bypass with excellent runoff with palpable DP pulses(9/23/2020).  Graft was patent on duplex ultrasound on 2/9/2021.  He noted a elevated distal velocity but no visible stenosis and an excellent DP pulse.    Over the last several weeks she has noticed recurrent claudication symptoms within the right calf and foot along with some whitish discoloration compared to the left side which is asymptomatic.        We noted on recent duplex on 5/19/2021 that the graft was patent although with some wall thrombus noted.  The native outflow popliteal artery was occluded for short segment of the collateral branches keeping the graft patent.  We felt that angiography was indicated to evaluate this further and potentially perform lytic therapy.    Patient presents today for this procedure with interventional radiology.    Exam: Alert and appropriate.  In care suites.   Blood pressure 138/82.  Pulse 64 regular   HEENT= normal   Chest= clear to auscultation   Cardiovascular= regular rate   Extremities= normal CMS.       Bruising right wrist with sutures from recent carpal ligament release.     +3 palpable pulses in the right and left bypass graft                No palpable right pedal pulses.   +3 left DP pulse.    I reviewed older angiograms from last year with radiology along with recent duplex ultrasound.    Laboratory tests= pending    Held Plavix starting yesterday.    Impression: Thrombosis of above-knee popliteal artery with patent cadaveric graft with some wall thrombus.  Excellent pulses still noted and patent via collateral.  No problems with the left leg bypass graft.  Plan angiogram via a  antegrade stick in the right superficial femoral artery cadaveric graft with diagnostic angiogram and intervention to try to remove the thrombus with Dr. Mckeon.  She may require lytic therapy and if so will be admitted.  Alvarez catheter will be placed since absolute strict bedrest will be necessary to prevent bleeding from the arterial sheath.  Discussed with patient.        Chadwick Lozano MD

## 2021-05-25 NOTE — PROCEDURES
Interventional Radiology Post-Procedure Note   [facility]  ?   Brief Procedure Note:   Patient name: Shirley Hendricks  Pt MRN:[id number]   Date of procedure: 5/25/2021     Procedure(s): RLE Angiogram with PTA and mechanical aspiration thromectomy  Sedation method: Moderate sedation was employed. The patient was monitored by an interventional radiology nurse at all times throughout the procedure under my direct guidance.  Pre Procedure Diagnosis: RLE PAD  Post Procedure Diagnosis: Occlusion of native popliteal artery  Indications: Occlusion of native popliteal artery   ?   Attending: Artie Church M.D.  Specimen(s) removed: None   Additional studies ordered: None  Drains: None   Estimated Blood Loss: 20cc  Complications: None  Vascular closure method: Manual pressure   Findings/Notes/Comments: RLE Aniogram demonstrating patent fempop with chronic appearing occlusion of native pop just past the anastomosis. Successful 3mm PTA and 4mm DCBA. Small thrombus at PTA site treated with aspiration thrombectomy with good result. Strong three vessel runoff at end of procedure  ?   Please see dictation in PACS or under the Imaging tab in [facility] for detailed procedure note.     Artie Church M.D.   Vascular and Interventional Radiology   Pager: [phone]   After Hours / Scheduling: [phone]     5/25/2021  5:05 PM

## 2021-05-26 VITALS
BODY MASS INDEX: 19.78 KG/M2 | WEIGHT: 107.5 LBS | SYSTOLIC BLOOD PRESSURE: 141 MMHG | RESPIRATION RATE: 16 BRPM | DIASTOLIC BLOOD PRESSURE: 54 MMHG | TEMPERATURE: 95.9 F | HEIGHT: 62 IN | OXYGEN SATURATION: 96 % | HEART RATE: 60 BPM

## 2021-05-26 LAB
UFH PPP CHRO-ACNC: 0.21 IU/ML
UFH PPP CHRO-ACNC: 0.76 IU/ML

## 2021-05-26 PROCEDURE — 99233 SBSQ HOSP IP/OBS HIGH 50: CPT | Performed by: INTERNAL MEDICINE

## 2021-05-26 PROCEDURE — 250N000013 HC RX MED GY IP 250 OP 250 PS 637: Performed by: INTERNAL MEDICINE

## 2021-05-26 PROCEDURE — 250N000011 HC RX IP 250 OP 636: Performed by: RADIOLOGY

## 2021-05-26 PROCEDURE — 99232 SBSQ HOSP IP/OBS MODERATE 35: CPT | Performed by: SURGERY

## 2021-05-26 PROCEDURE — 250N000013 HC RX MED GY IP 250 OP 250 PS 637

## 2021-05-26 PROCEDURE — 85520 HEPARIN ASSAY: CPT | Performed by: SURGERY

## 2021-05-26 PROCEDURE — 250N000013 HC RX MED GY IP 250 OP 250 PS 637: Performed by: NURSE PRACTITIONER

## 2021-05-26 PROCEDURE — 36415 COLL VENOUS BLD VENIPUNCTURE: CPT | Performed by: SURGERY

## 2021-05-26 RX ORDER — ASPIRIN 81 MG/1
81 TABLET, CHEWABLE ORAL DAILY
Qty: 30 TABLET | Refills: 3 | Status: SHIPPED | OUTPATIENT
Start: 2021-05-26 | End: 2021-05-26

## 2021-05-26 RX ORDER — ASPIRIN 81 MG/1
81 TABLET, CHEWABLE ORAL DAILY
Qty: 30 TABLET | Refills: 3 | Status: ON HOLD | OUTPATIENT
Start: 2021-05-26 | End: 2023-10-14

## 2021-05-26 RX ADMIN — LISINOPRIL 20 MG: 20 TABLET ORAL at 08:01

## 2021-05-26 RX ADMIN — CARVEDILOL 6.25 MG: 6.25 TABLET, FILM COATED ORAL at 08:15

## 2021-05-26 RX ADMIN — NICOTINE 1 PATCH: 21 PATCH, EXTENDED RELEASE TRANSDERMAL at 08:05

## 2021-05-26 RX ADMIN — CLOPIDOGREL BISULFATE 75 MG: 75 TABLET ORAL at 08:14

## 2021-05-26 RX ADMIN — AMLODIPINE BESYLATE 5 MG: 5 TABLET ORAL at 08:01

## 2021-05-26 RX ADMIN — ACETAMINOPHEN 500 MG: 500 TABLET, FILM COATED ORAL at 08:14

## 2021-05-26 RX ADMIN — HEPARIN SODIUM 750 UNITS/HR: 10000 INJECTION, SOLUTION INTRAVENOUS at 03:20

## 2021-05-26 NOTE — PLAN OF CARE
Pt A&O x4. VSS on RA. Regular diet. Up SBA. R groin site CDI. Heparin infusion  @750 unit/hr. Anti -Xa needs to check @0833. Denies pain. CMS intact. Weak R dorsalis pedis pulse. Stiches to right palm, covered with gauze. Continue to monitor.

## 2021-05-26 NOTE — PROGRESS NOTES
"VASCULAR SURGERY PROGRESS NOTE    Subjective:  Doing well this morning. Talking to family on video chat. No issues with right lower extremity.     Objective:    Intake/Output Summary (Last 24 hours) at 5/26/2021 0648  Last data filed at 5/25/2021 2120  Gross per 24 hour   Intake --   Output 400 ml   Net -400 ml     PHYSICAL EXAM:  /54 (BP Location: Right arm)   Pulse 57   Temp 97.2  F (36.2  C) (Oral)   Resp 16   Ht 1.575 m (5' 2\")   Wt 48.8 kg (107 lb 8 oz)   SpO2 94%   BMI 19.66 kg/m    General: The patient is alert and oriented. Appropriate. No acute distress  Psych: pleasant affect, answers questions appropriately  Skin: Color appropriate for race, warm, dry.  Respiratory: The patient does not require supplemental oxygen. Breathing unlabored  GI:  Abdomen soft, nontender to light palpation.    Extremities: Multiphasic R DP signal; multiphasic signal over the bypass graft. Palpable L DP and PT signals.     ASSESSMENT:  61 yo F s/p right femoral graft limb to AK popliteal cadaveric SFA bypass graft on 9/23/2020 who presents with recurrent claudication symptoms now s/p RLE angiogram via R CFA puncture with successful treatment of proximal popliteal artery and mechanical thrombectomy on 5/25/2021.     Now with multiphasic R DP signal.       PLAN:  - Plan for likely discharge today with aspirin, plavix; will need to determine additional anticoagulation   - Will have patient follow-up as outpatient with Dr. Lozano with XAVIER/Arterial Duplex       Justice Beach MD  Division of Vascular Surgery   Pager: [phone]    STAFF:  As above.  Doing well.   Occlusion of outflow AK Poplteal artery with widely patent Cadaveric SFA graft and good retrograde collaterals.  Distal Popliteal=A with 3 vessel runoff (smaller TPT).   Successful PTA/thrombectomy.    Access site=A on right graft.   +3 right/left graft pulses and left DP.  Good biphasic Doppler right popliteal and DP  Foot is warm and pink. No edema.    On IV " Heparin since Angio.      Chronic pedal neuropathy issues (not vascular etiology)    PLAN:  Home today on chronic ASA/Plavix   Duplex in about 4 weeks-- if recurrent stenosis plan outflow bypass to BK Popliteal artery.     Chadwick Lozano MD    25 minutes with patient. She will check her graft pulse daily and let me know if recurrent claudication right calf or color change (impling popliteal artery restenosis)      WRO

## 2021-05-26 NOTE — DISCHARGE INSTRUCTIONS
Ok to remove right groin dressing tomorrow per vascular surgery.     Peripheral Angiogram Discharge Instructions - Femoral     After you go home:      Have an adult stay with you until tomorrow.    Drink extra fluids for 2 days.    You may resume your normal diet.    No smoking       For 24 hours - due to the sedation you received:    Relax and take it easy.    Do NOT make any important or legal decisions.    Do NOT drive or operate machines at home or at work.    Do NOT drink alcohol.    Care of Groin Puncture Site:      For the first 24 hrs - check the puncture site every 1-2 hours while awake.    For 2 days, when you cough, sneeze, laugh or move your bowels, hold your hand over the puncture site and press firmly.    Remove the bandaid after 24 hours. If there is minor oozing, apply another bandaid and remove it after 12 hours.    It is normal to have a small bruise or pea size lump at the site.    You may shower tomorrow.  Do NOT take a bath, or use a hot tub or pool for at least 3 days. Do NOT scrub the site. Do not use lotion or powder near the puncture site.     Activity:            For 2 days:    No stooping or squatting    Do NOT do any heavy activity such as exercise, lifting, or straining.     No housework, yard work or any activity that make you sweat    Do NOT lift more than 10 pounds    Bleeding:      If you start bleeding from the site in your groin, lie down flat and press firmly on/above the site for 10 minutes.     Once bleeding stops, lay flat for 2 hours.     Call the [facility] as soon as you can.       Call 911 right away if you have heavy bleeding or bleeding that does not stop.      Medicines:      If you are on Metformin (Glucophage) and your GFR (kidney function level) is >30, you may continue taking your Metformin.    If you are on Metformin (Glucophage) and your GFR (kidney function level) is <30, do not restart the Metformin for 48 hours after your procedure. Check with your  primary care giver before restarting the Metformin to see if you need to have blood drawn to recheck your kidney function (GFR).    If you are taking an antiplatelet medication such as Plavix, do not stop taking it until you talk to your provider.       Take your medications, including blood thinners, unless your provider tells you not to.      If you take Coumadin (Warfarin), have your INR checked by your provider in  3-5 days. Call your clinic to schedule this.    If you have stopped any medicines, check with your provider about when to restart them.        Follow Up Appointments:      Follow up with [facility] as directed.    Call the clinic if:      You have increased pain or a large or growing hard lump around the site.    The site is red, swollen, hot or tender.    Blood or fluid is draining from the site.    You have chills or a fever greater than 101 F (38 C).    Your leg feels numb, cool or changes color.    You have hives, a rash or unusual itching.    New pain in the back or belly that you cannot control with Tylenol.    Any questions or concerns.    Other Instructions:      If you received a stent - carry your stent card with you at all times.      If you have questions or your original symptoms do not improve, call:         [facility] @ [phone]

## 2021-05-26 NOTE — PLAN OF CARE
A/ox4. Up with SBA. Tolerating regular diet. Voiding in bathroom. C/o headache, decreased with tylenol. VSS, on room air. R groin site CDI, weak pulses RLE. Plan to discharge home on aspirin/plavix. Discharge meds, instructions, follow up and AVS reviewed with patient prior to discharge home. Aspirin prescription sent with patient.

## 2021-05-26 NOTE — PROGRESS NOTES
Per Dr Church >  1 - may stop NS continuous gtt  2 - Start Heparin gtt - No Bolus  3- PO Plavix yet tonight  Pt transferred to Observation RM 4  via cart  Groin site checked with receiving RN > remains dry and intact

## 2021-05-26 NOTE — PROGRESS NOTES
[facility]    Vascular Medicine Progress Note    Date of Service (when I saw the patient): 05/26/2021     Assessment & Plan   Shirley Hendricks is a 60 year old female who was admitted on 5/25/2021.       1.  Peripheral arterial disease with history of aortobifemoral bypass graft and left femoral-popliteal bypass graft in past and more recently right femoral graft limb to above-knee popliteal cadaveric superficial femoral artery bypass graft 9/23/20  S/p RLE angiogram via R CFA puncture with successful treatment of proximal popliteal artery and mechanical thrombectomy on 5/25/2021     Site looks good   Asa and plavix  In 4 weeks RLE arterial duplex then see Dr. Lozano  Quit smoking see below   Continue statin      2. Symptomatic high grade left carotid artery stenosis s/p left carotid endarterectomy 6/8/20 and prior left carotid endarterectomy in the remote past.     She has recovered well from this surgery. She does have some new moderate restenosis to 60% on the right post right carotid CEA in the past and 50-69% stenosis on the left as of 4/2021. She is currently asymptomatic. This will be followed as an outpatient by Dr. Lozano.      3. Hyperlipidemia     Her lipid panel this admission is pending. She had been well controlled in the past on Crestor 40 mg daily. She should continue the same.      4. Hypertension     Continue prior to admission amlodipine, carvedilol, and lisinopril and monitor blood pressure closely.      5. Ongoing tobacco use     She admits to ongoing tobacco use, smoking 1/2 pack per day. She feels she has too much stress at home to successfully quit. The importance of tobacco cessation was stressed. She is willing to try Wellbutrin in combination with nicotine patches to assist with cessation. These were  prescribed      Beth Jason MD, SEB, [facility]  Vascular Medicine    Interval History     Doing well, access site looks good     Physical Exam   Temp:  95.9  F (35.5  C) Temp src: Oral BP: (!) 141/54 Pulse: 60   Resp: 16 SpO2: 96 % O2 Device: None (Room air) Oxygen Delivery: 2 LPM  Vitals:    05/25/21 1221   Weight: 48.8 kg (107 lb 8 oz)     Vital Signs with Ranges  Temp:  [95.9  F (35.5  C)-97.8  F (36.6  C)] 95.9  F (35.5  C)  Pulse:  [54-69] 60  Resp:  [11-23] 16  BP: (100-152)/(49-90) 141/54  SpO2:  [90 %-99 %] 96 %  I/O last 3 completed shifts:  In: -   Out: 400 [Urine:400]    Constitutional: Awake, alert, cooperative, no apparent distress, and appears stated age.  Eyes: Lids and lashes normal, pupils equal, round and reactive to light, extra ocular muscles intact, sclera clear, conjunctiva normal.  ENT: Normocephalic, without obvious abnormality  Respiratory: No increased work of breathing, good air exchange, clear to auscultation bilaterally, no crackles or wheezing.  Cardiovascular: Normal apical impulse, regular rate and rhythm, normal S1 and S2, no S3 or S4, and no murmur noted.  GI:  normal bowel sounds, soft, non-distended, non-tender, no masses palpated, no hepatosplenomegaly.  Lymph/Hematologic: No cervical lymphadenopathy and no supraclavicular lymphadenopathy.  Skin: No bruising or bleeding, normal skin color, texture, turgor, no redness, warmth, or swelling, no rashes, no lesions,  nails normal without discoloration or clubbing and no jaundice.  Musculoskeletal: There is no redness, warmth, or swelling of the joints.  Full range of motion noted.  Motor strength is 5 out of 5 all extremities bilaterally.  Tone is normal.  Neurologic: Awake, alert, oriented to name, place and time.  Cranial nerves II-XII are grossly intact.  Motor is 5 out of 5 bilaterally.   Neuropsychiatric: Calm, normal eye contact, alert, normal affect, oriented to self, place, time and situation, memory for past and recent events intact and thought process normal.  Pulses:   +3 right/left graft pulses and left DP.  Good biphasic Doppler right popliteal and DP  Foot is warm and  pink. No edema    Medications     heparin Stopped (05/26/21 6297)     sodium chloride Stopped (05/25/21 1927)       amLODIPine  5 mg Oral Daily     carvedilol  6.25 mg Oral BID w/meals     clopidogrel  75 mg Oral Daily     fluticasone-vilanterol  1 puff Inhalation Daily     lisinopril  20 mg Oral BID     nicotine  1 patch Transdermal Daily     nicotine   Transdermal Q8H     rosuvastatin  40 mg Oral QPM       Data   Recent Labs   Lab 05/25/21  1731   WBC 7.8   HGB 11.9   MCV 92   *

## 2021-05-26 NOTE — PLAN OF CARE
Pt A/Ox4. VSS on room air. Right groin site clean, dry, and intact. Weak Right pedal pulse. Heparin infusing at 6ml/hr. Hep Xa check at 0200. Nicotine patch to right shoulder. Stiches to right palm, covered with gauze. Voiding. Bedrest until 10pm.

## 2021-05-27 ENCOUNTER — TELEPHONE (OUTPATIENT)
Dept: OTHER | Facility: CLINIC | Age: 61
End: 2021-05-27

## 2021-05-27 ENCOUNTER — VIRTUAL VISIT (OUTPATIENT)
Dept: OTHER | Facility: CLINIC | Age: 61
End: 2021-05-27
Attending: SURGERY
Payer: COMMERCIAL

## 2021-05-27 DIAGNOSIS — I73.9 PAD (PERIPHERAL ARTERY DISEASE) (H): Primary | ICD-10-CM

## 2021-05-27 DIAGNOSIS — I70.201 POPLITEAL ARTERY OCCLUSION, RIGHT (H): ICD-10-CM

## 2021-05-27 PROCEDURE — 99212 OFFICE O/P EST SF 10 MIN: CPT | Mod: TEL | Performed by: SURGERY

## 2021-05-27 NOTE — PROGRESS NOTES
[facility]    Shirley Hendricks was found to have occlusion of the native popliteal artery just below her cadaveric SFA bypass with multiple collaterals.  We are able to successfully treat this with suction embolectomy and angioplasty with good results.    She tolerated procedure well.  Kept on heparin overnight and discharged yesterday on her chronic aspirin Plavix.    TELEPHONE CONSULT    I spoke with Shirley on the phone again today.  Her leg is feeling much better.  She does have a chronic neuropathy in both feet and hands that is not related to this.  She can easily palpate the right leg bypass graft.    Impression: Successful angiographic treatment of unsuspected occlusion of the right distal popliteal artery.  This was a small artery but disease-free at the time of surgery.  Three-vessel runoff was noted.  On chronic aspirin/Plavix.    I do have concerns about reocclusion of his smaller popliteal artery following intervention.  She will check the pulse on a daily basis.  Follow-up duplex will be performed in 4 weeks.  If we do see recurrent stenosis a distal outflow revision bypass would be necessary and patient is aware of this.    5 minutes on the phone today with the patient.    Chadwick Lozano MD

## 2021-06-03 ENCOUNTER — TELEPHONE (OUTPATIENT)
Dept: OTHER | Facility: CLINIC | Age: 61
End: 2021-06-03

## 2021-06-03 DIAGNOSIS — I73.9 PAD (PERIPHERAL ARTERY DISEASE) (H): Primary | ICD-10-CM

## 2021-06-03 DIAGNOSIS — I70.201 POPLITEAL ARTERY OCCLUSION, RIGHT (H): ICD-10-CM

## 2021-06-03 NOTE — TELEPHONE ENCOUNTER
Verified pt follow up with Dr. Lozano.   Imaging order placed and gold sheet given to .   ESTHER JohnsonN, RN  [facility]  Office:  [phone] Fax: [phone]

## 2021-06-10 ENCOUNTER — TELEPHONE (OUTPATIENT)
Dept: NEUROLOGY | Facility: CLINIC | Age: 61
End: 2021-06-10

## 2021-06-10 PROBLEM — G60.0 CHARCOT-MARIE-TOOTH DISEASE TYPE 1A: Status: ACTIVE | Noted: 2021-06-10

## 2021-06-10 LAB
LAB SCANNED RESULT: ABNORMAL
MISCELLANEOUS TEST: NORMAL

## 2021-06-10 NOTE — TELEPHONE ENCOUNTER
I called Shirley to discuss the results of genetic testing.  Our initial consultation occurred on 4/20/2021 where informed consent was obtained for genetic testing.    The results of the [facility] Comprehensive Neuropathies Panel were positive.  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       PMP22 Gain (Copy Number = 3)    Entire coding sequence (exons 2-5)    Pathogenic     A copy number gain of the entire PMP22 gene is associated with autosomal dominant Charcot-Breonna-Tooth disease type 1A (CMT1A).  This result is consistent with a diagnosis of Charcot-Breonna-Tooth disease type 1A     CMT1A is a demyelinating peripheral neuropathy characterized by distal muscle weakness and atrophy, sensory loss, and slow nerve conduction velocity.  CMT1A is usually slowly progressive and often associated with additional features including pes cavus foot deformity and bilateral foot drop.    Biological relatives have a chance of being at risk for [id number] conditions and should consider genetic testing. This testing would be at no charge as long as samples are received within 160 days of Shirley's genetic test report.    As we reviewed information regarding CMT1A, Shirley agreed that her symptoms aligned with this condition.    Genetic testing also identified 2 additional changes:    MME     c.467del (p.[id number]*14), known in the literature as 466del     Heterozygous     Pathogenic     The MME gene is associated with autosomal recessive Charcot-Breonna-Tooth disease type 2T (CMT2T).   Additionally, there is preliminary evidence supporting a correlation with autosomal dominant CMT2T and spinocerebellar ataxia 43 (SCA43).  CMT2T is a rare axonal, non-demyelinating peripheral neuropathy characterized by distal muscle weakness and atrophy, sensory loss, and absent tendon reflexes with onset in the 4th to 6th decade of life.    There is conflicting evidence regarding this variant and its clinical  significance.  Chen et al 2016 concluded that rare variants in MME (including this variant) segregated in an autosomal dominant-fashion with age-related incomplete penetrance.  However, Smita et al 2018 concluded at only MME variants in the recessive state were capable of neuropathy; they were not able to demonstrate autosomal dominant inheritance associated with the gene.    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    Biological relatives have a chance of being carriers for autosomal recessive CMT2T and could consider carrier screening. This testing would be of no charge as long as samples are received within 160 days of Shirley's genetic report.    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       Familial studies not offered.    A variant of uncertain significance represents a change in genetic information for which we are unsure of the classification (i.e. benign change or pathogenic change).  Frequently, variants of uncertain significance are downgraded to benign variation with additional information.  For these reasons, a variant of uncertain significance does not establish a molecular diagnosis.    We reviewed that the classification of variants may change over time as the result of new variant interpretation guidelines and/or new information.  Shirley should periodically check in with genetics (~1 year via [employer] message or phone call) to determine if there are any updates to the classification of these variants.    Personal History:   For additional details, review note on 4/20/2021 from myself.  To summarize, Shirley has a history of bilateral hand pain and paresthesias.  She reports that this only started about 6 months ago, but that it has progressed rapidly.  She reports that she has no  feeling in her hands and feet and that she is quite worried about this.  Shirley reports occasional dysphagia.  Shirley has a history of 3 heart attacks and severe peripheral artery disease.     On exam Shirley was noted to have profound thenar and hypothenar muscle atrophy.  She also has pes cavus and hammertoes.    Since we last spoke, the following changes have occurred regarding Shirley's health.    Shirley had carpal tunnel release surgery    Shirley had a blood clot removed from her leg    Family History:   A standard three generation pedigree was obtained and is scanned into the medical record at our consultation on 4/20/2021, see GENETIC PEDIGREE PROGRESS NOTE on 4/27/2021.  Shirley reports that she has a feeling this genetic condition came from her father's side of the family.    Recurrence Risk:  We reviewed the autosomal dominant nature of CMT1A and that Shirley's 3 children and 3 siblings are at a 50% or 1/2 risk of having inherited the PMP22 duplication and are at risk for CMT1A.  Shirley provided verbal permission for me to discuss her results with her 3 children and 3 children as necessary to facilitate genetic testing for them.    Medical Management:  Shirley should follow the care recommendations of her Neurologist.  We reviewed that there is no cure for CMT1A; treatment is supportive in nature.    Resources:    Charcot Breonna Tooth Association    https://www.cmtausa.org/    Assessment:  These results provide an etiology to Shirley's health concerns.  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    Plan:  1. I will mail a copy of these results along with a family letter describing this condition to Shirley.  2. Shirley should follow-up per the recommendations of her Neurologist.    Zunilda Rodriguez MS [facility]  Genetic Counselor  Email: [email or web address]  Phone: [phone]  Pager: [phone]    Total Time Spent in Consultation: Approximately 27 minutes    CC: No Letter    References:

## 2021-06-13 NOTE — TELEPHONE ENCOUNTER
S/p right lower extremity angiogram with lytic therapy on 5/25/21.   F/u plan: pt has tele visit today with Dr. Lozano.     ESTHER JohnsonN, RN  [facility]  Office:  [phone] Fax: [phone]     [FreeTextEntry1] : review of information in the E M [facility]\par CBC WBC 5.54 HGB 13.7  000\par \par 01/14/2021: CXR : cardiac enlargement no effusion. review of study on PACS image reviewer

## 2021-06-14 ENCOUNTER — HOSPITAL ENCOUNTER (OUTPATIENT)
Dept: ULTRASOUND IMAGING | Facility: CLINIC | Age: 61
End: 2021-06-14
Attending: SURGERY
Payer: COMMERCIAL

## 2021-06-14 ENCOUNTER — OFFICE VISIT (OUTPATIENT)
Dept: OTHER | Facility: CLINIC | Age: 61
End: 2021-06-14
Attending: PHYSICIAN ASSISTANT
Payer: COMMERCIAL

## 2021-06-14 ENCOUNTER — TELEPHONE (OUTPATIENT)
Dept: INTERNAL MEDICINE | Facility: CLINIC | Age: 61
End: 2021-06-14

## 2021-06-14 ENCOUNTER — TELEPHONE (OUTPATIENT)
Dept: OTHER | Facility: CLINIC | Age: 61
End: 2021-06-14

## 2021-06-14 VITALS — DIASTOLIC BLOOD PRESSURE: 75 MMHG | HEART RATE: 55 BPM | SYSTOLIC BLOOD PRESSURE: 150 MMHG

## 2021-06-14 DIAGNOSIS — I73.9 PAD (PERIPHERAL ARTERY DISEASE) (H): ICD-10-CM

## 2021-06-14 DIAGNOSIS — Z98.890 CRITICAL LIMB ISCHEMIA WITH HISTORY OF REVASCULARIZATION OF SAME EXTREMITY (H): Primary | ICD-10-CM

## 2021-06-14 DIAGNOSIS — I70.229 CRITICAL LOWER LIMB ISCHEMIA (H): Primary | ICD-10-CM

## 2021-06-14 DIAGNOSIS — Z91.041 CONTRAST MEDIA ALLERGY: Primary | ICD-10-CM

## 2021-06-14 DIAGNOSIS — I70.229 CRITICAL LIMB ISCHEMIA WITH HISTORY OF REVASCULARIZATION OF SAME EXTREMITY (H): Primary | ICD-10-CM

## 2021-06-14 DIAGNOSIS — I70.201 POPLITEAL ARTERY OCCLUSION, RIGHT (H): ICD-10-CM

## 2021-06-14 PROCEDURE — 99214 OFFICE O/P EST MOD 30 MIN: CPT | Performed by: PHYSICIAN ASSISTANT

## 2021-06-14 PROCEDURE — 93926 LOWER EXTREMITY STUDY: CPT | Mod: RT

## 2021-06-14 PROCEDURE — G0463 HOSPITAL OUTPT CLINIC VISIT: HCPCS | Mod: 25

## 2021-06-14 RX ORDER — METHYLPREDNISOLONE 16 MG/1
TABLET ORAL
Qty: 4 TABLET | Refills: 0 | Status: SHIPPED | OUTPATIENT
Start: 2021-06-14 | End: 2021-08-10

## 2021-06-14 RX ORDER — DIPHENHYDRAMINE HCL 25 MG
25 CAPSULE ORAL ONCE
Qty: 2 CAPSULE | Refills: 0 | Status: SHIPPED | OUTPATIENT
Start: 2021-06-14 | End: 2021-06-14

## 2021-06-14 NOTE — TELEPHONE ENCOUNTER
Patient Education    Procedure: Left brachial artery cutdown, right femoral to popliteal bypass, right leg angiogram  Diagnosis: critical limb ischemia  Anticoagulation Instruction: CONTINUE PLAVIX  Pre-Operative Physical Exam: You need to have a pre-op physical exam within 30 days of your procedure. Your procedure may be cancelled if you do not have a current History and Physical. Call your PCP's office to schedule.  Allergies:  Contrast allergy- RX for Medrol send to [facility] pharmacy- discussed with patient.   Bowel Prep: NA  NPO per protocol.   Post Procedure Education: [facility] patient post-procedure fact sheet reviewed with patient.    COVID-19 instructions for isolation and pre testing reviewed with pt.    Learner(s):patient  Method: Listening  Barriers to Learning:No Barrier  Outcome: Patient did verbalize understanding of above education.    Christina RAMIREZ, RN    [facility]  Office: [phone]  Fax: [phone]

## 2021-06-14 NOTE — PROGRESS NOTES
[facility]      Patient is in clinic today to see Mars Carlos.      Patient is here for a balloon angioplasty and thrombectomy follow up      Pt is currently taking Aspirin,Plavix, Statin    Patient's condition is stable.    BP (!) 150/75 (BP Location: Left arm, Patient Position: Chair, Cuff Size: Adult Regular)   Pulse 55   Breastfeeding No     The provider has been notified that the patient has no concerns.     Questions patient would like addressed today are: N/A.    Refills are needed: No    At the end of the visit the patient was provided with education both verbally and on their AVS regarding follow up appointment(s) and compressions.        Hanny Al MA

## 2021-06-14 NOTE — TELEPHONE ENCOUNTER
Pt's daughter Malu called to see how to sign up for social security/disability. She is having a video visit on 6/17 with Dr. Benoit and he may need to bring this up.    Malu [phone]

## 2021-06-14 NOTE — PROGRESS NOTES
[facility] Note     CC: Post-op check     Subjective:  Shirley Hendricks is here for for follow-up after recent right leg angiogram with thrombolytic therapy for an occlusion of her native popliteal artery. She has a history of aortobifemoral bypass graft and left femoral to popliteal in situ bypass graft which has been functioning well. Last year she underwent a right femoral graft limb to above-knee popliteal cadaveric SFA bypass with three-vessel runoff and palpable DP pulse in September 2020. She developed recurrent claudication symptoms and was found to have the above-mentioned occlusion in her distal anastomosis to native popliteal artery.     She continued to take Plavix since that procedure however she reports stopping aspirin sometime middle of last year. Approximately 2 weeks ago she noticed increased pain in her right leg that wakes her up at night. The pain is relieved by dangling her legs and walking around. She does not have pain during the day however she does report a soreness that develops which is relieved by sitting or lying down. She checks the pulse in her graft daily at the right medial thigh and reports this has been consistent and normal. She continues to smoke 1/2 pack to 1 pack of cigarettes daily. She just returned to work as a . 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    Patient reports her health has otherwise been stable. She has not started any new medications. She does not drink alcohol or use recreational drugs. She takes care of her , who is blind.    Objective:  Blood pressure (!) 150/75, pulse 55  Lungs-clear to auscultation bilaterally  Cardiac-regular rate and rhythm  Abdomen-soft nontender, healed surgical scar  Extremities-2+ radial pulses bilaterally, 3+ femoral pulses bilaterally.  3+ graft pulse at the medial thigh. Biphasic Doppler flow at right dorsalis pedis artery. Minimal right arterial tibial flow. Strong Doppler flow at left DP and PT.    US LOWER EXTREMITY ARTERIAL DUPLEX RIGHT  6/14/2021 10:27 AM      HISTORY:  60-year-old female status post an aortobifemoral bypass  graft and a right limb to above-knee popliteal artery bypass graft.  The native popliteal artery distal to the distal anastomosis had  occluded and was recanalized on 5/25/2021.     COMPARISON: 5/19/2021.     FINDINGS: Color Doppler and spectral waveform analysis performed.     The right limb to above-knee popliteal artery bypass graft is patent  with velocities ranging between 42 to 57 cm/s. There is a significant  elevation in peak systolic velocity at the distal anastomosis equaling  591 cm/s. The native artery distal to the distal anastomosis is again  occluded. Flow then travels in a retrograde manner in the right distal  superficial femoral artery and then into multiple collaterals which  help to reconstitute the tibial arteries. This had been seen on the  previous angiogram. Poststenotic monophasic waveforms are identified  in the distal tibial arteries.                                                                      IMPRESSION: Reocclusion of the native above-knee popliteal artery  distal to the right lower extremity bypass graft. There is a high  velocity at the distal anastomosis of the bypass graft. This could  indicate a significant stenosis.      Assessment:  Critical limb ischemia with reocclusion of the negative popliteal artery at the distal cadaveric conduit anastomosis.    Plan:  Nel Hendricks has a recurrence of the occlusion impeding graft outflow which threatens the patency and long-term viability of the right leg bypass graft. This comes one month following a similar event at which time the graft was reopened with angiogram and thrombolytics. This short interval of patency is indicative of a  unsatisfactory outflow vessel and will require surgical intervention. I reviewed the case with Dr. José Luis Hernandez who saw the patient in coordination during today's visit. Revascularization will involve a left brachial artery cutdown for angiography access and identification of suitable arterial targets followed by mid-distal right bypass graft to below-knee popliteal artery bypass utilizing cadaveric conduit.      This anticipated procedure was discussed with the patient in detail. She understands the urgency to save the bypass graft and will schedule at her earliest convenience following resolution to her current social issues which include time off work and care for her . Lastly, she will remain on Plavix until the day of the procedure and she will be pretreated for contrast reaction.    Mars Carlos PA-C    Please route or send letter to:  Primary Care Provider (PCP) and Referring Provider

## 2021-06-15 DIAGNOSIS — Z11.59 ENCOUNTER FOR SCREENING FOR OTHER VIRAL DISEASES: ICD-10-CM

## 2021-06-17 ENCOUNTER — VIRTUAL VISIT (OUTPATIENT)
Dept: INTERNAL MEDICINE | Facility: CLINIC | Age: 61
End: 2021-06-17
Payer: COMMERCIAL

## 2021-06-17 DIAGNOSIS — G60.0 CHARCOT-MARIE-TOOTH DISEASE TYPE 1A: ICD-10-CM

## 2021-06-17 DIAGNOSIS — I70.229 CRITICAL LOWER LIMB ISCHEMIA (H): ICD-10-CM

## 2021-06-17 DIAGNOSIS — F17.200 TOBACCO USE DISORDER: ICD-10-CM

## 2021-06-17 PROCEDURE — 99213 OFFICE O/P EST LOW 20 MIN: CPT | Mod: GT | Performed by: INTERNAL MEDICINE

## 2021-06-17 RX ORDER — NICOTINE 21 MG/24HR
1 PATCH, TRANSDERMAL 24 HOURS TRANSDERMAL EVERY 24 HOURS
Qty: 30 PATCH | Refills: 0 | Status: ON HOLD | OUTPATIENT
Start: 2021-06-17 | End: 2022-02-16

## 2021-06-17 ASSESSMENT — ANXIETY QUESTIONNAIRES
1. FEELING NERVOUS, ANXIOUS, OR ON EDGE: NOT AT ALL
7. FEELING AFRAID AS IF SOMETHING AWFUL MIGHT HAPPEN: NOT AT ALL
3. WORRYING TOO MUCH ABOUT DIFFERENT THINGS: NOT AT ALL
IF YOU CHECKED OFF ANY PROBLEMS ON THIS QUESTIONNAIRE, HOW DIFFICULT HAVE THESE PROBLEMS MADE IT FOR YOU TO DO YOUR WORK, TAKE CARE OF THINGS AT HOME, OR GET ALONG WITH OTHER PEOPLE: NOT DIFFICULT AT ALL
GAD7 TOTAL SCORE: 0
5. BEING SO RESTLESS THAT IT IS HARD TO SIT STILL: NOT AT ALL
2. NOT BEING ABLE TO STOP OR CONTROL WORRYING: NOT AT ALL
6. BECOMING EASILY ANNOYED OR IRRITABLE: NOT AT ALL

## 2021-06-17 ASSESSMENT — PATIENT HEALTH QUESTIONNAIRE - PHQ9: 5. POOR APPETITE OR OVEREATING: NOT AT ALL

## 2021-06-17 NOTE — PROGRESS NOTES
Shirley is a 60 year old who is being evaluated via a billable video visit.      How would you like to obtain your AVS? Daisyhart  If the video visit is dropped, the invitation should be resent by: Other e-mail: Daisyamara   Will anyone else be joining your video visit? No      ASSESSMENT:   1. Tobacco use disorder  Strongly encourage patient to quit smoking given negative effects on vascular disease and also a nerve function with CMT. patient will start NicoDerm 21 mg patch and titrate slowly as necessary  - nicotine (NICODERM CQ) 21 MG/24HR 24 hr patch; Place 1 patch onto the skin every 24 hours  Dispense: 30 patch; Refill: 0    2. Critical lower limb ischemia  Failing right lower extremity arterial graft.  Has upcoming surgery and preop scheduled.  Smoking cessation counseling as above.    3. Charcot-Breonna-Tooth disease type 1A  Recent neurology genetics note reviewed.  No cure available.  Patient has appointment scheduled with PM&R later in July and will check with neurology to see if they wish to have any further follow-up with patient in addition      PLAN:  NicoDerm 21 mg patch.  Put on the morning and take off at bedtime.  Patient to start today and stop all smoking today  After 30 days, then decrease NicoDerm to 14 mg patch daily for 14 to 28 days and then decrease further to 7 mg patch daily for 14 to 28 days  Follow-up with me next week for preop regarding upcoming scheduled  Right leg vascular surgery  Follow-up with physical medicine/rehab in July as scheduled  I  will send a computer message to Dr Holm (Neurology) to see if he wishes for patient to follow-up with him in the near future regarding recent confirming dx of Charcot Breonna Tooth Disease Type 1 A by genetic testing or just to see PM&R in July as scheduled and follow-up with Neuro in Spring 2022 as per his initial note April 2021 in chart        SHAYAN HOLM MD      Video-Visit Details    Type of service:  Video Visit    Video Start  Time: 10:32am    Video End Time:10:57am    Originating Location (pt. Location): Home    Distant Location (provider location):  [facility]     Platform used for Video Visit: Debby Benoit MD  Internal Medicine Department  [facility]  Internal Medicine Department      (Chart documentation was completed, in part, with [employer] voice-recognition software. Even though reviewed, some grammatical, spelling, and word errors may remain.)       Vishal Watson is a 60 year old who presents for the following health issues     HPI     Chief Complaint   Patient presents with     Follow Up     for Medical Issues      Discuss     Social Security/Disability Benefits      Most recent lab results reviewed with pt.      HPI:   In cool area, then hands are OK. In warmer area, harder to function with hands and hard to write or hold a pen or  things  Has increased numbness and some weakness in her ankles.  Also thenar atrophy around the hands.  Recent genetic testing through neurology has showed patient to have dx of Charcot Breonna Tooth Disease Type 1.  Has an appointment with PM&R in late July.  Last saw neurology in April.  It appears from that note that they were going to see patient 1 year later but that was before diagnosis of CMT confirmed so unclear if patient is to follow-up with neuro anymore  Patient continues to smoke 1/2 to 1 pack of cigarettes per day.  Has right lower extremity vascular issues with  recurrence of  reduced graft outflow   threatening the patency/ long-term viability of the right leg bypass graft.     Additional ROS:   Constitutional, HEENT, Cardiovascular, Pulmonary, GI and , Neuro, MSK and Psych review of systems/symptoms are otherwise negative or unchanged from previous, except as noted above.           Objective :  No vitals obtained today    Physical Exam:  GENERAL: alert and no distress  EYES: Eyes grossly normal to  inspection, conjunctivae and sclerae normal  RESP: no audible wheeze, cough, or visible cyanosis.  No visible retractions or increased work of breathing.  Able to speak fully in complete sentences.  NEURO: Cranial nerves grossly intact, mentation intact and speech normal  PSYCH: mentation appears normal, affect normal/bright, judgement and insight intact, normal speech and appearance well-groomed

## 2021-06-18 ASSESSMENT — ANXIETY QUESTIONNAIRES: GAD7 TOTAL SCORE: 0

## 2021-06-19 DIAGNOSIS — Z11.59 ENCOUNTER FOR SCREENING FOR OTHER VIRAL DISEASES: ICD-10-CM

## 2021-06-19 LAB
LABORATORY COMMENT REPORT: NORMAL
SARS-COV-2 RNA RESP QL NAA+PROBE: NEGATIVE
SARS-COV-2 RNA RESP QL NAA+PROBE: NORMAL
SPECIMEN SOURCE: NORMAL
SPECIMEN SOURCE: NORMAL

## 2021-06-19 PROCEDURE — U0005 INFEC AGEN DETEC AMPLI PROBE: HCPCS | Performed by: SURGERY

## 2021-06-19 PROCEDURE — U0003 INFECTIOUS AGENT DETECTION BY NUCLEIC ACID (DNA OR RNA); SEVERE ACUTE RESPIRATORY SYNDROME CORONAVIRUS 2 (SARS-COV-2) (CORONAVIRUS DISEASE [COVID-19]), AMPLIFIED PROBE TECHNIQUE, MAKING USE OF HIGH THROUGHPUT TECHNOLOGIES AS DESCRIBED BY CMS-2020-01-R: HCPCS | Performed by: SURGERY

## 2021-06-21 ENCOUNTER — OFFICE VISIT (OUTPATIENT)
Dept: INTERNAL MEDICINE | Facility: CLINIC | Age: 61
End: 2021-06-21
Payer: COMMERCIAL

## 2021-06-21 VITALS
SYSTOLIC BLOOD PRESSURE: 126 MMHG | DIASTOLIC BLOOD PRESSURE: 62 MMHG | WEIGHT: 109 LBS | HEART RATE: 60 BPM | BODY MASS INDEX: 20.06 KG/M2 | TEMPERATURE: 97.8 F | HEIGHT: 62 IN | OXYGEN SATURATION: 96 % | RESPIRATION RATE: 16 BRPM

## 2021-06-21 DIAGNOSIS — J44.9 CHRONIC OBSTRUCTIVE PULMONARY DISEASE, UNSPECIFIED COPD TYPE (H): ICD-10-CM

## 2021-06-21 DIAGNOSIS — I25.10 CORONARY ARTERY DISEASE INVOLVING NATIVE CORONARY ARTERY OF NATIVE HEART WITHOUT ANGINA PECTORIS: ICD-10-CM

## 2021-06-21 DIAGNOSIS — Z01.818 PREOPERATIVE EXAMINATION: Primary | ICD-10-CM

## 2021-06-21 DIAGNOSIS — E87.1 HYPONATREMIA: ICD-10-CM

## 2021-06-21 DIAGNOSIS — I70.229 CRITICAL LOWER LIMB ISCHEMIA (H): ICD-10-CM

## 2021-06-21 DIAGNOSIS — G60.0 CHARCOT-MARIE-TOOTH DISEASE TYPE 1A: ICD-10-CM

## 2021-06-21 DIAGNOSIS — I10 ESSENTIAL HYPERTENSION: ICD-10-CM

## 2021-06-21 DIAGNOSIS — D69.6 THROMBOCYTOPENIA (H): ICD-10-CM

## 2021-06-21 DIAGNOSIS — E78.5 HYPERLIPIDEMIA LDL GOAL <70: ICD-10-CM

## 2021-06-21 DIAGNOSIS — F17.200 TOBACCO USE DISORDER: ICD-10-CM

## 2021-06-21 LAB
ANION GAP SERPL CALCULATED.3IONS-SCNC: <1 MMOL/L (ref 3–14)
BUN SERPL-MCNC: 13 MG/DL (ref 7–30)
CALCIUM SERPL-MCNC: 8.7 MG/DL (ref 8.5–10.1)
CHLORIDE SERPL-SCNC: 100 MMOL/L (ref 94–109)
CO2 SERPL-SCNC: 30 MMOL/L (ref 20–32)
CREAT SERPL-MCNC: 0.65 MG/DL (ref 0.52–1.04)
ERYTHROCYTE [DISTWIDTH] IN BLOOD BY AUTOMATED COUNT: 12.9 % (ref 10–15)
GFR SERPL CREATININE-BSD FRML MDRD: >90 ML/MIN/{1.73_M2}
GLUCOSE SERPL-MCNC: 78 MG/DL (ref 70–99)
HCT VFR BLD AUTO: 37.7 % (ref 35–47)
HGB BLD-MCNC: 12.4 G/DL (ref 11.7–15.7)
MCH RBC QN AUTO: 31.8 PG (ref 26.5–33)
MCHC RBC AUTO-ENTMCNC: 32.9 G/DL (ref 31.5–36.5)
MCV RBC AUTO: 97 FL (ref 78–100)
PLATELET # BLD AUTO: 124 10E9/L (ref 150–450)
POTASSIUM SERPL-SCNC: 4.6 MMOL/L (ref 3.4–5.3)
RBC # BLD AUTO: 3.9 10E12/L (ref 3.8–5.2)
SODIUM SERPL-SCNC: 130 MMOL/L (ref 133–144)
WBC # BLD AUTO: 4.8 10E9/L (ref 4–11)

## 2021-06-21 PROCEDURE — 36415 COLL VENOUS BLD VENIPUNCTURE: CPT | Performed by: INTERNAL MEDICINE

## 2021-06-21 PROCEDURE — 80048 BASIC METABOLIC PNL TOTAL CA: CPT | Performed by: INTERNAL MEDICINE

## 2021-06-21 PROCEDURE — 99215 OFFICE O/P EST HI 40 MIN: CPT | Performed by: INTERNAL MEDICINE

## 2021-06-21 PROCEDURE — 85027 COMPLETE CBC AUTOMATED: CPT | Performed by: INTERNAL MEDICINE

## 2021-06-21 ASSESSMENT — MIFFLIN-ST. JEOR: SCORE: 1007.67

## 2021-06-21 NOTE — H&P (VIEW-ONLY)
[address] [address]  Phone: [phone]  Primary Provider: Vasquez Benoit        PREOPERATIVE EVALUATION:  Today's date: 6/21/2021    Shirley Hendricks is a 60 year old female who presents for a preoperative evaluation.    Surgical Information:  Surgery/Procedure: RIGHT FEMORAL TO POPLITEAL BYPASS and RIGHT LEG ANGIOGRAM WITH LEFT BRACHIAL ARTERY CUTDOWN  Surgery Location: [facility]   Surgeon: José Luis Hernandez MD  Surgery Date: 06/23/2021  Time of Surgery: 12:35pm  Where patient plans to recover: Patient will be under observation-In hospital   Fax number for surgical facility: Note does not need to be faxed, will be available electronically in Epic.    Type of Anesthesia Anticipated: General    Assessment & Plan     The proposed surgical procedure is considered HIGH risk.    Preoperative examination  Patient appears medically stable to proceed with surgery as scheduled    Critical lower limb ischemia  See HPI    Coronary artery disease involving native coronary artery of native heart without angina pectoris  Recent stress echo nondiagnostic as patient did not fully reach target heart rate but did not have symptoms of chest pain or shortness of breath with the study and no EKG or wall motion abnormality changes were noted.  Has not used nitroglycerin in the last year    Thrombocytopenia (H)  Mild and improved.  Not affecting clotting status at current level  - CBC with platelets    Essential hypertension  Controlled current medication  - Basic metabolic panel    Charcot-Breonna-Tooth disease type 1A  Recent diagnosis.  Continue management per neurology.  Has appointment scheduled with PM&R in addition.  Gait not currently requiring assistance such as cane/walker    Chronic obstructive pulmonary disease, unspecified COPD type (H)  Controlled with inhaler therapy    Hyperlipidemia LDL goal <70  At goal with statin therapy    Tobacco use  disorder  On nicotine patches.  Tobacco use reduced but still smoking 4 to 5 cigarettes/day.  Again strongly instructed patient to discontinue all tobacco immediately given risk for complicating success of upcoming surgery and also putting lungs at increased risk    Hyponatremia  Minimal.  New.  Not on diuretic therapy.  Will monitor    Contrast dye allergy  Patient has been prescribed Benadryl and methylprednisolone to take prior to surgery.  See medication list for details    Risks and Recommendations:  The patient has the following additional risks and recommendations for perioperative complications:   - No identified additional risk factors other than previously addressed    Patient instructions:  No solid food after midnight   May have clear liquids up to 4 hours prior to surgery   Take usual  medications the AM of surgery  Take Methylprednisolone and Benadryl prior to procedure as instructed in med list  Try to stop  smoking completely now.  Continue Nicotine patches  Labs as ordered      RECOMMENDATION:  APPROVAL GIVEN to proceed with proposed procedure, without further diagnostic evaluation.           Subjective     HPI related to upcoming procedure:        History of right femoral graft limb to above-knee popliteal cadaveric SFA bypass with three-vessel runoff and palpable DP pulses in September 2020.  Patient developed recurrent claudication symptoms and was found to have an occlusion in her distal anastomosis to native popliteal artery.  Underwent recent angiogram of the right leg with thrombolytic therapy for the occlusion.  Approximately 3 weeks ago, patient noticed increasing pain in her right leg at night which was relieved by dangling her legs and walking.  Patient had continued smoking 1/2 to 1 pack cigarettes per day.  Patient was subsequently seen by the vascular clinic in follow-up.  Given unsatisfactory outflow the vessel, surgical intervention was recommended involving a left brachial artery  cutdown for angiography access, identification of suitable arterial targets followed by the distal right bypass graft to below-knee popliteal artery bypass utilizing a cadaveric conduit.  See below for other medical issues.  Patient was seen by me about a week ago and started on nicotine patch therapy.  She continues to smoke 4 to 5 cigarettes/day despite this but this is significantly less than what she was smoking prior to that.  With regards to current exercise tolerance, patient is able to do some yard work such as raking or general ADL walking without symptoms of chest pain or shortness of breath      Preop Questions 6/18/2021   1. Have you ever had a heart attack or stroke? YES -previous Anterior/lateral MI many years ago.  Also history of TIA and PAD   2. Have you ever had surgery on your heart or blood vessels, such as a stent placement, a coronary artery bypass, or surgery on an artery in your head, neck, heart, or legs? YES -see surgical history below   3. Do you have chest pain with activity? No   4. Do you have a history of  heart failure? No   5. Do you currently have a cold, bronchitis or symptoms of other infection? No   6. Do you have a cough, shortness of breath, or wheezing? No   7. Do you or anyone in your family have previous history of blood clots? YES -brother had a history of lower extremity DVT and son has a history of DVT/PE   8. Do you or does anyone in your family have a serious bleeding problem such as prolonged bleeding following surgeries or cuts? No   9. Have you ever had problems with anemia or been told to take iron pills? No   10. Have you had any abnormal blood loss such as black, tarry or bloody stools, or abnormal vaginal bleeding? No   11. Have you ever had a blood transfusion? No   12. Are you willing to have a blood transfusion if it is medically needed before, during, or after your surgery? Yes   13. Have you or any of your relatives ever had problems with anesthesia? No    14. Do you have sleep apnea, excessive snoring or daytime drowsiness? No   15. Do you have any artifical heart valves or other implanted medical devices like a pacemaker, defibrillator, or continuous glucose monitor? No   16. Do you have artificial joints? No   17. Are you allergic to latex? No   18. Is there any chance that you may be pregnant? -       Health Care Directive:  Patient does not have a Health Care Directive or Living Will:     Preoperative Review of :   reviewed - controlled substances prescribed by other outside provider(s).   Was prescribed a total of 6 tablets of oxycodone 5 mg on 5/20/2021 by outside physician after carpal tunnel surgery      Status of Chronic Conditions:  COPD - Patient has a longstanding history of moderate-severe COPD . Patient has been doing well overall noting NO SYMPTOMS and continues on medication regimen consisting of  Breo without adverse reactions or side effects.     HYPERLIPIDEMIA - Patient has a long history of significant Hyperlipidemia requiring medication for treatment with recent good control. Patient reports no problems or side effects with the medication.      HYPERTENSION - Patient has longstanding history of HTN , currently denies any symptoms referable to elevated blood pressure. Specifically denies chest pain, palpitations, dyspnea, orthopnea, PND. Blood pressure readings have been in normal range. Current medication regimen is as listed below. Patient denies any side effects of medication.       CAD - No recent chest pain with exertion. Underwent recent Stress ECHO that was negative for ischemia but pt did not reach target HR with the study so inconclusive. Did not have chest pain during the study. Normal EF and no WMA seen on study     PAD -  See HPI      GERD -controlled with PPI      TOBACCO USE - Smoking 1/2 - 1 ppd up until 1 week ago.  Now approximately 4 to 5 cigarettes/day.  On nicotine patch.      OSTEOPOROSIS -  On Prolia injections.  Stable      CHARCOT AMAN TOOTH - recent diagnosis. Numbness in her hands and feet.  Noted to have profound thenar and hypothenar muscle atrophy.  She also has pes cavus and hammertoes. Followed by Neurology. Not requiring braces on LEs.     Review of Systems  CONSTITUTIONAL: NEGATIVE for fever, chills, change in weight  INTEGUMENTARY/SKIN: NEGATIVE for worrisome rashes, moles or lesions  EYES: NEGATIVE for vision changes or irritation. Has readers  ENT/MOUTH: NEGATIVE for ear, mouth and throat problems  RESP: NEGATIVE for significant cough or SOB. Has reduced smoking to 4 cigs/day. Using nicotine patches  BREAST: NEGATIVE for masses, tenderness or discharge  CV: NEGATIVE for chest pain, palpitations or peripheral edema  GI: NEGATIVE for nausea, abdominal pain, heartburn, or change in bowel habits  : NEGATIVE for frequency, dysuria, or hematuria  MUSCULOSKELETAL: NEGATIVE for significant arthralgias or myalgia  NEURO:  POSITIVE for numbness in hand and feet along with reduced  strength in hands  ENDOCRINE: NEGATIVE for temperature intolerance. On statin  HEME: NEGATIVE for bleeding problems in general. Hx mild thrombocytopenia.   PSYCHIATRIC:  POSITIVE for some stress caring for her .   is blind.  Also some stress regarding recent CMT diagnosis and wondering if she will be able to continue working in the future.  Employed as a  but is not been driving recently while on medical leave with current symptoms.  Denies true depression.  No suicidal ideation    Patient Active Problem List    Diagnosis Date Noted     Health Care Home 06/06/2011     Priority: High     EMERGENCY CARE PLAN  Presenting Problem Signs and Symptoms Treatment Plan    Questions or conerns during clinic hours    I will call the clinic directly     Questions or conerns outside clinic hours    I will call the 24 hour nurse line at [phone]    Patient needs to schedule an appointment    I will call the 24 hour  scheduling team at [phone] or clinic directly    Same day treatment     I will call the clinic first, nurse line if after hours, urgent care and express care if needed                            DX V65.8 REPLACED WITH 68926 HEALTH CARE HOME (04/08/2013)       Critical lower limb ischemia 06/14/2021     Priority: Medium     Added automatically from request for surgery [id number]       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     Priority: Medium     Pathogenic PMP22 Duplication   [facility] #[id number]       Bilateral carpal tunnel syndrome      Priority: Medium     Thrombocytopenia (H)      Priority: Medium     History of colonic polyps 02/21/2020     Priority: Medium     SVT (supraventricular tachycardia) (H) 02/21/2020     Priority: Medium     Vitamin C deficiency 08/12/2018     Priority: Medium     Anxiety 12/07/2017     Priority: Medium     Chronic allergic rhinitis due to animal hair and dander 07/20/2017     Priority: Medium     Tobacco use disorder 07/20/2017     Priority: Medium     Chronic obstructive pulmonary disease, unspecified COPD type (H) 07/20/2017     Priority: Medium     S/P carotid endarterectomy 07/07/2016     Priority: Medium     RIGHT SIDE       Coronary artery disease involving native coronary artery of native heart without angina pectoris 03/03/2016     Priority: Medium     Primary osteoarthritis involving multiple joints 03/03/2016     Priority: Medium     DDD (degenerative disc disease), lumbar 03/03/2016     Priority: Medium     Hyperlipidemia LDL goal <70 07/07/2014     Priority: Medium     PAD (peripheral artery disease) (H) 07/07/2014     Priority: Medium     Essential hypertension 07/07/2014     Priority: Medium     Problem list name updated by automated process. 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 Simons dermatology     Embolism and thrombosis of unspecified artery (H) 08/2000    Protein C,S, Leiden FV, Lupus Inhibitor Negative     Gastroesophageal reflux disease      Hyperlipidaemia      Hypertension      Lupus (H)     skin     Mild major depression (H) 11/07/2017     Myocardial infarction (H)     x3     Osteoarthrosis, unspecified whether generalized or localized, unspecified site      PAD (peripheral artery disease) (H)      Peripheral vascular disease, unspecified (H) 12/2000    s/p angioplasty with stent right femoral a.; Right iliac and femoral a. clot     Post-menopausal      Reflux esophagitis 02/2004    EGD: esophagitis and medium HH     SVT (supraventricular tachycardia) (H)      Thrombocytopenia (H)      Uncomplicated asthma      Vitamin C deficiency 08/12/2018     Past Surgical History:   Procedure Laterality Date     ANGIOGRAM       BYPASS GRAFT FEMOROPOPLITEAL Right 09/23/2020    Procedure: RIGHT FEMORAL GRAFT TO ABOVE-KNEE POPLITEAL BYPASS USING CADAVERIC SUPERFICIAL FEMORAL ARTERY;  Surgeon: Chadwick Lozano MD;  Location:  OR     C FABRIC WRAPPING OF ABDOMINAL ANEURYSM       CARDIAC CATHERIZATION  09/03/2009    multivessel CAD without target lesions, med mgmt indicated, preserved ef     CARPAL TUNNEL RELEASE RT/LT Right 05/20/2021     ENDARTERECTOMY CAROTID Right 05/11/2016    Procedure: ENDARTERECTOMY CAROTID;  Surgeon: Chadwick Lozano MD;  Location:  OR     ENDARTERECTOMY CAROTID Left 06/08/2020    Procedure: LEFT CAROTID ENDARTERECTOMY with distal facal vein patch  and EEG;  Surgeon: Chadwick Lozano MD;  Location:  OR     GYN SURGERY  left tube    left salpingectomy     IR LOWER EXTREMITY ANGIOGRAM RIGHT  05/25/2021      LAPAROSCOPIC CHOLECYSTECTOMY N/A 09/27/2017    Procedure: LAPAROSCOPIC CHOLECYSTECTOMY;  LAPAROSCOPIC CHOLECYSTECTOMY;  Surgeon: Jacoby Tapia MD;  [facility]     ORTHOPEDIC SURGERY      left knee surgery     VASCULAR SURGERY  aoto bi fem  left fem-AK pop     [facility] NONSPECIFIC PROCEDURE  12/2000    angioplasty with stent right fem. a.     [facility] NONSPECIFIC PROCEDURE  1987    sinus surgery     [facility] NONSPECIFIC PROCEDURE  09/02/2009    Emergent left groin exploration with oversewing of bleeding angiographic site. 2. Endarterectomy of common femoral-proximal superficial femoral artery with greater saphenous vein patch angioplasty.   a. [place] of accessory right greater saphenous vein.      [facility] NONSPECIFIC PROCEDURE  08/27/2009    occluded left common iliac and external iliac arteries were successfully revascularized with stenting to 8 and 7 mm      Current Outpatient Medications   Medication Sig Dispense Refill     acetaminophen (TYLENOL) 500 MG tablet Take 500-1,000 mg by mouth every 6 hours as needed for mild pain       amLODIPine (NORVASC) 5 MG tablet Take 1 tablet (5 mg) by mouth 2 times daily 180 tablet 3     aspirin (ASA) 81 MG chewable tablet Take 1 tablet (81 mg) by mouth daily 30 tablet 3     BREO ELLIPTA 200-25 MCG/INH Inhaler Inhale 1 puff into the lungs daily (Patient taking differently: Inhale 1 puff into the lungs daily ) 3 Inhaler 3     buPROPion (ZYBAN) 150 MG 12 hr tablet Take 1 tablet (150 mg) by mouth 2 times daily Take one tablet daily for the first 3 days, then take twice daily after that. 60 tablet 11     CALCIUM 600-D 600-400 MG-UNIT TABS Take 1 tablet by mouth 2 times daily 180 tablet 3     CALCIUM PO Take 1 tablet by mouth every evening       carvedilol (COREG) 6.25 MG tablet TAKE ONE TABLET BY MOUTH TWICE A DAY WITH MEALS  180 tablet 3     clopidogrel (PLAVIX) 75 MG tablet Take 1 tablet (75 mg) by mouth daily 90 tablet 3     denosumab (PROLIA) 60 MG/ML SOLN injection Inject 1 mL (60  mg) Subcutaneous every 6 months INDICATION: TO TREAT OSTEOPOROSIS 1 Syringe 0     diphenhydrAMINE (BENADRYL) 25 MG tablet Take 25 to 50mg once 30 minutes to 2 hours before procedure. 2 tablet 0     fluticasone (FLONASE) 50 MCG/ACT nasal spray Spray 1 spray into both nostrils daily 16 g 0     lisinopril (ZESTRIL) 20 MG tablet Take 1 tablet (20 mg) by mouth 2 times daily. 180 tablet 3     methylPREDNISolone (MEDROL) 16 MG tablet Take 32 mg by mouth 12 hours before the procedure and repeat 32 mg by mouth 2 hours before the procedure to prevent contrast reaction. 4 tablet 0     nicotine (NICODERM CQ) 14 MG/24HR 24 hr patch Place 1 patch onto the skin every 24 hours 30 patch 1     nicotine (NICODERM CQ) 21 MG/24HR 24 hr patch Place 1 patch onto the skin every 24 hours 30 patch 0     nicotine (NICODERM CQ) 7 MG/24HR 24 hr patch Place 1 patch onto the skin every 24 hours Start after completing 14 mg patches. 30 patch 1     nitroGLYcerin (NITROSTAT) 0.4 MG sublingual tablet Place 1 tablet (0.4 mg) under the tongue See Admin Instructions for chest pain 30 tablet 11     omeprazole (PRILOSEC) 20 MG DR capsule TAKE ONE CAPSULE BY MOUTH DAILY (Patient taking differently: Take 20 mg by mouth daily TAKE ONE CAPSULE BY MOUTH DAILY) 90 capsule 3     rosuvastatin (CRESTOR) 40 MG tablet Take 1 tablet (40 mg) by mouth At Bedtime 90 tablet 2       Allergies   Allergen Reactions     Contrast Dye Anaphylaxis     RASH, FACIAL AND NECK SWELLING, SOB, WHEEZING     Pantoprazole      Protonix caused diffuse edema     Chantix [Varenicline]      Terrible dreams     Penicillins Itching        Social History     Tobacco Use     Smoking status: Current Every Day Smoker     Packs/day: 0.25     Years: 40.00     Pack years: 10.00     Types: Cigarettes     Start date: 1958     Smokeless tobacco: Never Used     Tobacco comment: 1/2 PPD   Substance Use Topics     Alcohol use: Yes     Alcohol/week: 0.0 standard drinks     Comment: x1-2 yr     Family  "History   Problem Relation Age of Onset     Cancer Mother         bladder     Cardiovascular Father         alive,multiple heart attacks     Diabetes Maternal Grandmother      Lung Cancer Maternal Grandmother      Blood Disease Brother         clotting disorder     History   Drug Use No         Objective     /62   Pulse 60   Temp 97.8  F (36.6  C) (Temporal)   Resp 16   Ht 1.575 m (5' 2\")   Wt 49.4 kg (109 lb)   SpO2 96%   BMI 19.94 kg/m      Physical Exam  HENT: ear canals and TM's normal and nose and mouth without ulcers or lesions   Neck: no adenopathy. Thyroid normal to palpation. No bruits  Pulm: Lungs clear to auscultation with slight prolonged expiratory phase.  No wheezes or rhonchi   CV: Regular rates and rhythm  GI: Soft, nontender, Normal active bowel sounds, No hepatosplenomegaly or masses palpable  Ext: Strong radial and femoral pulses bilaterally.  Palpable left dorsalis pedis/posterior tibial pulse.  Pulse not palpable right foot.  Bilateral feet are warm to touch.  No edema. Thenar and hypothenar muscle atrophy  Neuro: Decreased light touch sensation distal bilateral upper lower extremities.  Dorsiflexion strength 4/5 bilaterally    Recent Labs   Lab Test 05/25/21  1731 05/13/21  1832 01/05/21  1119 09/25/20  0807 09/24/20  0739 09/23/20  0844 09/23/20  0844 06/08/20  1302 06/08/20  1302   HGB 11.9 11.9 12.8 12.8 11.4*  --   --    < > 14.1   * 110* 133* 158 114*   < >  --    < > 149*   INR  --   --   --   --  1.01  --   --   --   --    NA  --   --  134 139 133  --   --    < > 137   POTASSIUM  --   --  4.4 3.6 4.1  --  4.1   < > 4.1   CR  --   --  0.58 0.53 0.47*  --  0.47*   < > 0.56   A1C  --   --   --   --   --   --  5.4  --  5.5    < > = values in this interval not displayed.        Diagnostics:  Component      Latest Ref Rng & Units 6/21/2021   Sodium      133 - 144 mmol/L 130 (L)   Potassium      3.4 - 5.3 mmol/L 4.6   Chloride      94 - 109 mmol/L 100   Carbon Dioxide      " 20 - 32 mmol/L 30   Anion Gap      3 - 14 mmol/L <1 (L)   Glucose      70 - 99 mg/dL 78   Urea Nitrogen      7 - 30 mg/dL 13   Creatinine      0.52 - 1.04 mg/dL 0.65   GFR Estimate      >60 mL/min/1.73:m2 >90   GFR Estimate If Black      >60 mL/min/1.73:m2 >90   Calcium      8.5 - 10.1 mg/dL 8.7   WBC      4.0 - 11.0 10e9/L 4.8   RBC Count      3.8 - 5.2 10e12/L 3.90   Hemoglobin      11.7 - 15.7 g/dL 12.4   Hematocrit      35.0 - 47.0 % 37.7   MCV      78 - 100 fl 97   MCH      26.5 - 33.0 pg 31.8   MCHC      31.5 - 36.5 g/dL 32.9   RDW      10.0 - 15.0 % 12.9   Platelet Count      150 - 450 10e9/L 124 (L)         EKG 5/13/21: Sinus bradycardia with rate 56.  Poor R wave progression consistent with old anterior infarct.  No acute ST/T changes.  No change from previous EKGs    Revised Cardiac Risk Index (RCRI):  The patient has the following serious cardiovascular risks for perioperative complications:   - Coronary Artery Disease (MI, positive stress test, angina, Qs on EKG) = 1 point   - Cerebrovascular Disease (TIA or CVA) = 1 point      RCRI Interpretation: 2 points: Class III (moderate risk - 6.6% complication rate)     Estimated Functional Capacity: Performs 4 METS exercise without symptoms (e.g., light housework, stairs, 4 mph walk, 7 mph bike, slow step dance)  Patient underwent recent stress echo 4/16/21 and did not have symptoms of chest pain or shortness of breath with this and stopped due to fatigue.  No EKG changes or WMA changes were seen during the study.  However, patient was not able to reach target heart rate  Stress Results         Protocol:  [facility]ie[facility] Protocol        Maximum Predicted HR:   158 bpm         Target HR: 134 bpm               % Maximum Predicted HR: 66 %        Stage  DurationHeart Rate  BP                    Comment             (mm:ss)   (bpm)    Stage 1   3:00      104   158/80Patient requested to stop   RecoveryR  4:00      56    134/74RPP = 12453, [place] = 2, FAC = Below  average             Stress Duration:   3:00 mm:ss        Recovery Time: 4:00 mm:ss          Maximum Stress HR: 104 bpm           METS:          4           Signed Electronically by: Vasquez Benoit MD  Copy of this evaluation report is provided to requesting physician.

## 2021-06-21 NOTE — PROGRESS NOTES
[address] [address]  Phone: [phone]  Primary Provider: Vasquez Benoit        PREOPERATIVE EVALUATION:  Today's date: 6/21/2021    Shirley Hendricks is a 60 year old female who presents for a preoperative evaluation.    Surgical Information:  Surgery/Procedure: RIGHT FEMORAL TO POPLITEAL BYPASS and RIGHT LEG ANGIOGRAM WITH LEFT BRACHIAL ARTERY CUTDOWN  Surgery Location: [facility]   Surgeon: José Luis Hernandez MD  Surgery Date: 06/23/2021  Time of Surgery: 12:35pm  Where patient plans to recover: Patient will be under observation-In hospital   Fax number for surgical facility: Note does not need to be faxed, will be available electronically in Epic.    Type of Anesthesia Anticipated: General    Assessment & Plan     The proposed surgical procedure is considered HIGH risk.    Preoperative examination  Patient appears medically stable to proceed with surgery as scheduled    Critical lower limb ischemia  See HPI    Coronary artery disease involving native coronary artery of native heart without angina pectoris  Recent stress echo nondiagnostic as patient did not fully reach target heart rate but did not have symptoms of chest pain or shortness of breath with the study and no EKG or wall motion abnormality changes were noted.  Has not used nitroglycerin in the last year    Thrombocytopenia (H)  Mild and improved.  Not affecting clotting status at current level  - CBC with platelets    Essential hypertension  Controlled current medication  - Basic metabolic panel    Charcot-Breonna-Tooth disease type 1A  Recent diagnosis.  Continue management per neurology.  Has appointment scheduled with PM&R in addition.  Gait not currently requiring assistance such as cane/walker    Chronic obstructive pulmonary disease, unspecified COPD type (H)  Controlled with inhaler therapy    Hyperlipidemia LDL goal <70  At goal with statin therapy    Tobacco use  disorder  On nicotine patches.  Tobacco use reduced but still smoking 4 to 5 cigarettes/day.  Again strongly instructed patient to discontinue all tobacco immediately given risk for complicating success of upcoming surgery and also putting lungs at increased risk    Hyponatremia  Minimal.  New.  Not on diuretic therapy.  Will monitor    Contrast dye allergy  Patient has been prescribed Benadryl and methylprednisolone to take prior to surgery.  See medication list for details    Risks and Recommendations:  The patient has the following additional risks and recommendations for perioperative complications:   - No identified additional risk factors other than previously addressed    Patient instructions:  No solid food after midnight   May have clear liquids up to 4 hours prior to surgery   Take usual  medications the AM of surgery  Take Methylprednisolone and Benadryl prior to procedure as instructed in med list  Try to stop  smoking completely now.  Continue Nicotine patches  Labs as ordered      RECOMMENDATION:  APPROVAL GIVEN to proceed with proposed procedure, without further diagnostic evaluation.           Subjective     HPI related to upcoming procedure:        History of right femoral graft limb to above-knee popliteal cadaveric SFA bypass with three-vessel runoff and palpable DP pulses in September 2020.  Patient developed recurrent claudication symptoms and was found to have an occlusion in her distal anastomosis to native popliteal artery.  Underwent recent angiogram of the right leg with thrombolytic therapy for the occlusion.  Approximately 3 weeks ago, patient noticed increasing pain in her right leg at night which was relieved by dangling her legs and walking.  Patient had continued smoking 1/2 to 1 pack cigarettes per day.  Patient was subsequently seen by the vascular clinic in follow-up.  Given unsatisfactory outflow the vessel, surgical intervention was recommended involving a left brachial artery  cutdown for angiography access, identification of suitable arterial targets followed by the distal right bypass graft to below-knee popliteal artery bypass utilizing a cadaveric conduit.  See below for other medical issues.  Patient was seen by me about a week ago and started on nicotine patch therapy.  She continues to smoke 4 to 5 cigarettes/day despite this but this is significantly less than what she was smoking prior to that.  With regards to current exercise tolerance, patient is able to do some yard work such as raking or general ADL walking without symptoms of chest pain or shortness of breath      Preop Questions 6/18/2021   1. Have you ever had a heart attack or stroke? YES -previous Anterior/lateral MI many years ago.  Also history of TIA and PAD   2. Have you ever had surgery on your heart or blood vessels, such as a stent placement, a coronary artery bypass, or surgery on an artery in your head, neck, heart, or legs? YES -see surgical history below   3. Do you have chest pain with activity? No   4. Do you have a history of  heart failure? No   5. Do you currently have a cold, bronchitis or symptoms of other infection? No   6. Do you have a cough, shortness of breath, or wheezing? No   7. Do you or anyone in your family have previous history of blood clots? YES -brother had a history of lower extremity DVT and son has a history of DVT/PE   8. Do you or does anyone in your family have a serious bleeding problem such as prolonged bleeding following surgeries or cuts? No   9. Have you ever had problems with anemia or been told to take iron pills? No   10. Have you had any abnormal blood loss such as black, tarry or bloody stools, or abnormal vaginal bleeding? No   11. Have you ever had a blood transfusion? No   12. Are you willing to have a blood transfusion if it is medically needed before, during, or after your surgery? Yes   13. Have you or any of your relatives ever had problems with anesthesia? No    14. Do you have sleep apnea, excessive snoring or daytime drowsiness? No   15. Do you have any artifical heart valves or other implanted medical devices like a pacemaker, defibrillator, or continuous glucose monitor? No   16. Do you have artificial joints? No   17. Are you allergic to latex? No   18. Is there any chance that you may be pregnant? -       Health Care Directive:  Patient does not have a Health Care Directive or Living Will:     Preoperative Review of :   reviewed - controlled substances prescribed by other outside provider(s).   Was prescribed a total of 6 tablets of oxycodone 5 mg on 5/20/2021 by outside physician after carpal tunnel surgery      Status of Chronic Conditions:  COPD - Patient has a longstanding history of moderate-severe COPD . Patient has been doing well overall noting NO SYMPTOMS and continues on medication regimen consisting of  Breo without adverse reactions or side effects.     HYPERLIPIDEMIA - Patient has a long history of significant Hyperlipidemia requiring medication for treatment with recent good control. Patient reports no problems or side effects with the medication.      HYPERTENSION - Patient has longstanding history of HTN , currently denies any symptoms referable to elevated blood pressure. Specifically denies chest pain, palpitations, dyspnea, orthopnea, PND. Blood pressure readings have been in normal range. Current medication regimen is as listed below. Patient denies any side effects of medication.       CAD - No recent chest pain with exertion. Underwent recent Stress ECHO that was negative for ischemia but pt did not reach target HR with the study so inconclusive. Did not have chest pain during the study. Normal EF and no WMA seen on study     PAD -  See HPI      GERD -controlled with PPI      TOBACCO USE - Smoking 1/2 - 1 ppd up until 1 week ago.  Now approximately 4 to 5 cigarettes/day.  On nicotine patch.      OSTEOPOROSIS -  On Prolia injections.  Stable      CHARCOT AMAN TOOTH - recent diagnosis. Numbness in her hands and feet.  Noted to have profound thenar and hypothenar muscle atrophy.  She also has pes cavus and hammertoes. Followed by Neurology. Not requiring braces on LEs.     Review of Systems  CONSTITUTIONAL: NEGATIVE for fever, chills, change in weight  INTEGUMENTARY/SKIN: NEGATIVE for worrisome rashes, moles or lesions  EYES: NEGATIVE for vision changes or irritation. Has readers  ENT/MOUTH: NEGATIVE for ear, mouth and throat problems  RESP: NEGATIVE for significant cough or SOB. Has reduced smoking to 4 cigs/day. Using nicotine patches  BREAST: NEGATIVE for masses, tenderness or discharge  CV: NEGATIVE for chest pain, palpitations or peripheral edema  GI: NEGATIVE for nausea, abdominal pain, heartburn, or change in bowel habits  : NEGATIVE for frequency, dysuria, or hematuria  MUSCULOSKELETAL: NEGATIVE for significant arthralgias or myalgia  NEURO:  POSITIVE for numbness in hand and feet along with reduced  strength in hands  ENDOCRINE: NEGATIVE for temperature intolerance. On statin  HEME: NEGATIVE for bleeding problems in general. Hx mild thrombocytopenia.   PSYCHIATRIC:  POSITIVE for some stress caring for her .   is blind.  Also some stress regarding recent CMT diagnosis and wondering if she will be able to continue working in the future.  Employed as a  but is not been driving recently while on medical leave with current symptoms.  Denies true depression.  No suicidal ideation    Patient Active Problem List    Diagnosis Date Noted     Health Care Home 06/06/2011     Priority: High     EMERGENCY CARE PLAN  Presenting Problem Signs and Symptoms Treatment Plan    Questions or conerns during clinic hours    I will call the clinic directly     Questions or conerns outside clinic hours    I will call the 24 hour nurse line at [phone]    Patient needs to schedule an appointment    I will call the 24 hour  scheduling team at [phone] or clinic directly    Same day treatment     I will call the clinic first, nurse line if after hours, urgent care and express care if needed                            DX V65.8 REPLACED WITH [id number] HEALTH CARE HOME (04/08/2013)       Critical lower limb ischemia 06/14/2021     Priority: Medium     Added automatically from request for surgery [id number]       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     Priority: Medium     Pathogenic PMP22 Duplication   [facility] #[id number]       Bilateral carpal tunnel syndrome      Priority: Medium     Thrombocytopenia (H)      Priority: Medium     History of colonic polyps 02/21/2020     Priority: Medium     SVT (supraventricular tachycardia) (H) 02/21/2020     Priority: Medium     Vitamin C deficiency 08/12/2018     Priority: Medium     Anxiety 12/07/2017     Priority: Medium     Chronic allergic rhinitis due to animal hair and dander 07/20/2017     Priority: Medium     Tobacco use disorder 07/20/2017     Priority: Medium     Chronic obstructive pulmonary disease, unspecified COPD type (H) 07/20/2017     Priority: Medium     S/P carotid endarterectomy 07/07/2016     Priority: Medium     RIGHT SIDE       Coronary artery disease involving native coronary artery of native heart without angina pectoris 03/03/2016     Priority: Medium     Primary osteoarthritis involving multiple joints 03/03/2016     Priority: Medium     DDD (degenerative disc disease), lumbar 03/03/2016     Priority: Medium     Hyperlipidemia LDL goal <70 07/07/2014     Priority: Medium     PAD (peripheral artery disease) (H) 07/07/2014     Priority: Medium     Essential hypertension 07/07/2014     Priority: Medium     Problem list name updated by automated process. 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 Simons dermatology     Embolism and thrombosis of unspecified artery (H) 08/2000    Protein C,S, Leiden FV, Lupus Inhibitor Negative     Gastroesophageal reflux disease      Hyperlipidaemia      Hypertension      Lupus (H)     skin     Mild major depression (H) 11/07/2017     Myocardial infarction (H)     x3     Osteoarthrosis, unspecified whether generalized or localized, unspecified site      PAD (peripheral artery disease) (H)      Peripheral vascular disease, unspecified (H) 12/2000    s/p angioplasty with stent right femoral a.; Right iliac and femoral a. clot     Post-menopausal      Reflux esophagitis 02/2004    EGD: esophagitis and medium HH     SVT (supraventricular tachycardia) (H)      Thrombocytopenia (H)      Uncomplicated asthma      Vitamin C deficiency 08/12/2018     Past Surgical History:   Procedure Laterality Date     ANGIOGRAM       BYPASS GRAFT FEMOROPOPLITEAL Right 09/23/2020    Procedure: RIGHT FEMORAL GRAFT TO ABOVE-KNEE POPLITEAL BYPASS USING CADAVERIC SUPERFICIAL FEMORAL ARTERY;  Surgeon: Chadwick Lozano MD;  Location:  OR     C FABRIC WRAPPING OF ABDOMINAL ANEURYSM       CARDIAC CATHERIZATION  09/03/2009    multivessel CAD without target lesions, med mgmt indicated, preserved ef     CARPAL TUNNEL RELEASE RT/LT Right 05/20/2021     ENDARTERECTOMY CAROTID Right 05/11/2016    Procedure: ENDARTERECTOMY CAROTID;  Surgeon: Chadwick Lozano MD;  Location:  OR     ENDARTERECTOMY CAROTID Left 06/08/2020    Procedure: LEFT CAROTID ENDARTERECTOMY with distal facal vein patch  and EEG;  Surgeon: Chadwick Lozano MD;  Location:  OR     GYN SURGERY  left tube    left salpingectomy     IR LOWER EXTREMITY ANGIOGRAM RIGHT  05/25/2021      LAPAROSCOPIC CHOLECYSTECTOMY N/A 09/27/2017    Procedure: LAPAROSCOPIC CHOLECYSTECTOMY;  LAPAROSCOPIC CHOLECYSTECTOMY;  Surgeon: Jacoby Tapia MD;  Location: [facility]     ORTHOPEDIC SURGERY      left knee surgery     VASCULAR SURGERY  aoto bi fem  left fem-AK pop     [facility] NONSPECIFIC PROCEDURE  12/2000    angioplasty with stent right fem. a.     [facility] NONSPECIFIC PROCEDURE  1987    sinus surgery     [facility] NONSPECIFIC PROCEDURE  09/02/2009    Emergent left groin exploration with oversewing of bleeding angiographic site. 2. Endarterectomy of common femoral-proximal superficial femoral artery with greater saphenous vein patch angioplasty.   a. [place] of accessory right greater saphenous vein.      [facility] NONSPECIFIC PROCEDURE  08/27/2009    occluded left common iliac and external iliac arteries were successfully revascularized with stenting to 8 and 7 mm      Current Outpatient Medications   Medication Sig Dispense Refill     acetaminophen (TYLENOL) 500 MG tablet Take 500-1,000 mg by mouth every 6 hours as needed for mild pain       amLODIPine (NORVASC) 5 MG tablet Take 1 tablet (5 mg) by mouth 2 times daily 180 tablet 3     aspirin (ASA) 81 MG chewable tablet Take 1 tablet (81 mg) by mouth daily 30 tablet 3     BREO ELLIPTA 200-25 MCG/INH Inhaler Inhale 1 puff into the lungs daily (Patient taking differently: Inhale 1 puff into the lungs daily ) 3 Inhaler 3     buPROPion (ZYBAN) 150 MG 12 hr tablet Take 1 tablet (150 mg) by mouth 2 times daily Take one tablet daily for the first 3 days, then take twice daily after that. 60 tablet 11     CALCIUM 600-D 600-400 MG-UNIT TABS Take 1 tablet by mouth 2 times daily 180 tablet 3     CALCIUM PO Take 1 tablet by mouth every evening       carvedilol (COREG) 6.25 MG tablet TAKE ONE TABLET BY MOUTH TWICE A DAY WITH MEALS  180 tablet 3     clopidogrel (PLAVIX) 75 MG tablet Take 1 tablet (75 mg) by mouth daily 90 tablet 3     denosumab (PROLIA) 60 MG/ML SOLN injection Inject 1 mL (60  mg) Subcutaneous every 6 months INDICATION: TO TREAT OSTEOPOROSIS 1 Syringe 0     diphenhydrAMINE (BENADRYL) 25 MG tablet Take 25 to 50mg once 30 minutes to 2 hours before procedure. 2 tablet 0     fluticasone (FLONASE) 50 MCG/ACT nasal spray Spray 1 spray into both nostrils daily 16 g 0     lisinopril (ZESTRIL) 20 MG tablet Take 1 tablet (20 mg) by mouth 2 times daily. 180 tablet 3     methylPREDNISolone (MEDROL) 16 MG tablet Take 32 mg by mouth 12 hours before the procedure and repeat 32 mg by mouth 2 hours before the procedure to prevent contrast reaction. 4 tablet 0     nicotine (NICODERM CQ) 14 MG/24HR 24 hr patch Place 1 patch onto the skin every 24 hours 30 patch 1     nicotine (NICODERM CQ) 21 MG/24HR 24 hr patch Place 1 patch onto the skin every 24 hours 30 patch 0     nicotine (NICODERM CQ) 7 MG/24HR 24 hr patch Place 1 patch onto the skin every 24 hours Start after completing 14 mg patches. 30 patch 1     nitroGLYcerin (NITROSTAT) 0.4 MG sublingual tablet Place 1 tablet (0.4 mg) under the tongue See Admin Instructions for chest pain 30 tablet 11     omeprazole (PRILOSEC) 20 MG DR capsule TAKE ONE CAPSULE BY MOUTH DAILY (Patient taking differently: Take 20 mg by mouth daily TAKE ONE CAPSULE BY MOUTH DAILY) 90 capsule 3     rosuvastatin (CRESTOR) 40 MG tablet Take 1 tablet (40 mg) by mouth At Bedtime 90 tablet 2       Allergies   Allergen Reactions     Contrast Dye Anaphylaxis     RASH, FACIAL AND NECK SWELLING, SOB, WHEEZING     Pantoprazole      Protonix caused diffuse edema     Chantix [Varenicline]      Terrible dreams     Penicillins Itching        Social History     Tobacco Use     Smoking status: Current Every Day Smoker     Packs/day: 0.25     Years: 40.00     Pack years: 10.00     Types: Cigarettes     Start date: 1958     Smokeless tobacco: Never Used     Tobacco comment: 1/2 PPD   Substance Use Topics     Alcohol use: Yes     Alcohol/week: 0.0 standard drinks     Comment: x1-2 yr     Family  "History   Problem Relation Age of Onset     Cancer Mother         bladder     Cardiovascular Father         alive,multiple heart attacks     Diabetes Maternal Grandmother      Lung Cancer Maternal Grandmother      Blood Disease Brother         clotting disorder     History   Drug Use No         Objective     /62   Pulse 60   Temp 97.8  F (36.6  C) (Temporal)   Resp 16   Ht 1.575 m (5' 2\")   Wt 49.4 kg (109 lb)   SpO2 96%   BMI 19.94 kg/m      Physical Exam  HENT: ear canals and TM's normal and nose and mouth without ulcers or lesions   Neck: no adenopathy. Thyroid normal to palpation. No bruits  Pulm: Lungs clear to auscultation with slight prolonged expiratory phase.  No wheezes or rhonchi   CV: Regular rates and rhythm  GI: Soft, nontender, Normal active bowel sounds, No hepatosplenomegaly or masses palpable  Ext: Strong radial and femoral pulses bilaterally.  Palpable left dorsalis pedis/posterior tibial pulse.  Pulse not palpable right foot.  Bilateral feet are warm to touch.  No edema. Thenar and hypothenar muscle atrophy  Neuro: Decreased light touch sensation distal bilateral upper lower extremities.  Dorsiflexion strength 4/5 bilaterally    Recent Labs   Lab Test 05/25/21  1731 05/13/21  1832 01/05/21  1119 09/25/20  0807 09/24/20  0739 09/23/20  0844 09/23/20  0844 06/08/20  1302 06/08/20  1302   HGB 11.9 11.9 12.8 12.8 11.4*  --   --    < > 14.1   * 110* 133* 158 114*   < >  --    < > 149*   INR  --   --   --   --  1.01  --   --   --   --    NA  --   --  134 139 133  --   --    < > 137   POTASSIUM  --   --  4.4 3.6 4.1  --  4.1   < > 4.1   CR  --   --  0.58 0.53 0.47*  --  0.47*   < > 0.56   A1C  --   --   --   --   --   --  5.4  --  5.5    < > = values in this interval not displayed.        Diagnostics:  Component      Latest Ref Rng & Units 6/21/2021   Sodium      133 - 144 mmol/L 130 (L)   Potassium      3.4 - 5.3 mmol/L 4.6   Chloride      94 - 109 mmol/L 100   Carbon Dioxide      " 20 - 32 mmol/L 30   Anion Gap      3 - 14 mmol/L <1 (L)   Glucose      70 - 99 mg/dL 78   Urea Nitrogen      7 - 30 mg/dL 13   Creatinine      0.52 - 1.04 mg/dL 0.65   GFR Estimate      >60 mL/min/1.73:m2 >90   GFR Estimate If Black      >60 mL/min/1.73:m2 >90   Calcium      8.5 - 10.1 mg/dL 8.7   WBC      4.0 - 11.0 10e9/L 4.8   RBC Count      3.8 - 5.2 10e12/L 3.90   Hemoglobin      11.7 - 15.7 g/dL 12.4   Hematocrit      35.0 - 47.0 % 37.7   MCV      78 - 100 fl 97   MCH      26.5 - 33.0 pg 31.8   MCHC      31.5 - 36.5 g/dL 32.9   RDW      10.0 - 15.0 % 12.9   Platelet Count      150 - 450 10e9/L 124 (L)         EKG 5/13/21: Sinus bradycardia with rate 56.  Poor R wave progression consistent with old anterior infarct.  No acute ST/T changes.  No change from previous EKGs    Revised Cardiac Risk Index (RCRI):  The patient has the following serious cardiovascular risks for perioperative complications:   - Coronary Artery Disease (MI, positive stress test, angina, Qs on EKG) = 1 point   - Cerebrovascular Disease (TIA or CVA) = 1 point      RCRI Interpretation: 2 points: Class III (moderate risk - 6.6% complication rate)     Estimated Functional Capacity: Performs 4 METS exercise without symptoms (e.g., light housework, stairs, 4 mph walk, 7 mph bike, slow step dance)  Patient underwent recent stress echo 4/16/21 and did not have symptoms of chest pain or shortness of breath with this and stopped due to fatigue.  No EKG changes or WMA changes were seen during the study.  However, patient was not able to reach target heart rate  Stress Results         Protocol:  [facility] Protocol        Maximum Predicted HR:   158 bpm         Target HR: 134 bpm               % Maximum Predicted HR: 66 %        Stage  DurationHeart Rate  BP                    Comment             (mm:ss)   (bpm)    Stage 1   3:00      104   158/80Patient requested to stop   RecoveryR  4:00      56    134/74RPP = 70449, [place] = 2, FAC = Below  average             Stress Duration:   3:00 mm:ss        Recovery Time: 4:00 mm:ss          Maximum Stress HR: 104 bpm           METS:          4           Signed Electronically by: Vasquez Benoit MD  Copy of this evaluation report is provided to requesting physician.

## 2021-06-21 NOTE — PATIENT INSTRUCTIONS
No solid food after midnight   May have clear liquids up to 4 hours prior to surgery   Take usual  medications the AM of surgery  Take Methylprednisolone and Benadryl prior to procedure as instructed in med list  Try to stop  smoking completely now.  Continue Nicotine patches  Labs as ordered

## 2021-06-22 ENCOUNTER — TELEPHONE (OUTPATIENT)
Dept: INTERNAL MEDICINE | Facility: CLINIC | Age: 61
End: 2021-06-22

## 2021-06-22 NOTE — PROGRESS NOTES
PTA medications updated by Medication Scribe prior to surgery via phone call with patient (last doses completed by Nurse)     Medication history sources: Patient and Surescripts  In the past week, patient estimated taking medication this percent of the time: Greater than 90%  Adherence assessment: N/A Not Observed    Significant changes made to the medication list:  Patient reports no longer taking the following meds (med scribe removed from PTA med list): Flonase Nasal Spray      Additional medication history information:   Patient brought own home meds: Breo Ellipta Inhaler    Medication reconciliation completed by provider prior to medication history? No    Time spent in this activity: 35 minutes    The information provided in this note is only as accurate as the sources available at the time of update(s)      Prior to Admission medications    Medication Sig Last Dose Taking? Auth Provider   acetaminophen (TYLENOL) 500 MG tablet Take 500-1,000 mg by mouth every 6 hours as needed for mild pain  at PRN Yes Reported, Patient   amLODIPine (NORVASC) 5 MG tablet Take 1 tablet (5 mg) by mouth 2 times daily  at AM Yes Vasquez Benoit MD   aspirin (ASA) 81 MG chewable tablet Take 1 tablet (81 mg) by mouth daily MORE THAN TWO WEEKS Yes Chadwick Lozano   buPROPion (ZYBAN) 150 MG 12 hr tablet Take 1 tablet (150 mg) by mouth 2 times daily Take one tablet daily for the first 3 days, then take twice daily after that.  at AM Yes Ailin Nichols PA-C   Calcium Carbonate-Vitamin D3 (CALCIUM 600-D) 600-400 MG-UNIT TABS Take 1 tablet by mouth every evening 6/22/2021 at PM Yes Reported, Patient   carvedilol (COREG) 6.25 MG tablet TAKE ONE TABLET BY MOUTH TWICE A DAY WITH MEALS   at AM Yes Vasquez Benoit MD   clopidogrel (PLAVIX) 75 MG tablet Take 1 tablet (75 mg) by mouth daily  at AM Yes Vasquez Benoit MD   denosumab (PROLIA) 60 MG/ML SOLN injection Inject 1 mL (60 mg) Subcutaneous every 6 months INDICATION: TO TREAT  OSTEOPOROSIS  Yes Vasquez Benoit MD   diphenhydrAMINE (BENADRYL) 25 MG tablet Take 25 to 50mg once 30 minutes to 2 hours before procedure.  at AM Yes Chadwick Lozano   fluticasone-vilanterol (BREO ELLIPTA) 200-25 MCG/INH inhaler Inhale 1 puff into the lungs daily  at AM Yes Reported, Patient   lisinopril (ZESTRIL) 20 MG tablet Take 1 tablet (20 mg) by mouth 2 times daily.  at AM Yes Vasquez Benoit MD   methylPREDNISolone (MEDROL) 16 MG tablet Take 32 mg by mouth 12 hours before the procedure and repeat 32 mg by mouth 2 hours before the procedure to prevent contrast reaction.  at AM Yes Chadwick Lozano   nicotine (NICODERM CQ) 14 MG/24HR 24 hr patch Place 1 patch onto the skin every 24 hours  Yes Ailin Nichols PA-C   omeprazole (PRILOSEC) 20 MG DR capsule TAKE ONE CAPSULE BY MOUTH DAILY  Patient taking differently: Take 20 mg by mouth daily   at AM Yes Vasquez Benoit MD   rosuvastatin (CRESTOR) 40 MG tablet Take 1 tablet (40 mg) by mouth At Bedtime 6/22/2021 at PM Yes Vasquez Benoit MD   nicotine (NICODERM CQ) 21 MG/24HR 24 hr patch Place 1 patch onto the skin every 24 hours   Vasquez Benoit MD   nicotine (NICODERM CQ) 7 MG/24HR 24 hr patch Place 1 patch onto the skin every 24 hours Start after completing 14 mg patches.   Ailin Nichols PA-C   nitroGLYcerin (NITROSTAT) 0.4 MG sublingual tablet Place 1 tablet (0.4 mg) under the tongue See Admin Instructions for chest pain   Vasquez Benoit MD Gerard Burgess, [facility]  [facility]ication [facility]

## 2021-06-23 ENCOUNTER — APPOINTMENT (OUTPATIENT)
Dept: ULTRASOUND IMAGING | Facility: CLINIC | Age: 61
End: 2021-06-23
Attending: SURGERY
Payer: COMMERCIAL

## 2021-06-23 ENCOUNTER — HOSPITAL ENCOUNTER (OUTPATIENT)
Facility: CLINIC | Age: 61
Setting detail: OBSERVATION
LOS: 1 days | Discharge: HOME | End: 2021-06-24
Attending: SURGERY | Admitting: SURGERY
Payer: COMMERCIAL

## 2021-06-23 ENCOUNTER — ANESTHESIA (OUTPATIENT)
Dept: SURGERY | Facility: CLINIC | Age: 61
End: 2021-06-23
Payer: COMMERCIAL

## 2021-06-23 ENCOUNTER — ANESTHESIA EVENT (OUTPATIENT)
Dept: SURGERY | Facility: CLINIC | Age: 61
End: 2021-06-23
Payer: COMMERCIAL

## 2021-06-23 ENCOUNTER — APPOINTMENT (OUTPATIENT)
Dept: SURGERY | Facility: PHYSICIAN GROUP | Age: 61
End: 2021-06-23
Payer: COMMERCIAL

## 2021-06-23 ENCOUNTER — APPOINTMENT (OUTPATIENT)
Dept: INTERVENTIONAL RADIOLOGY/VASCULAR | Facility: CLINIC | Age: 61
End: 2021-06-23
Attending: SURGERY
Payer: COMMERCIAL

## 2021-06-23 DIAGNOSIS — I70.229 CRITICAL LOWER LIMB ISCHEMIA (H): Primary | ICD-10-CM

## 2021-06-23 DIAGNOSIS — I70.229 CRITICAL LOWER LIMB ISCHEMIA (H): ICD-10-CM

## 2021-06-23 LAB
ABO + RH BLD: NORMAL
ABO + RH BLD: NORMAL
BLD GP AB SCN SERPL QL: NORMAL
BLOOD BANK CMNT PATIENT-IMP: NORMAL
POTASSIUM SERPL-SCNC: 4.3 MMOL/L (ref 3.4–5.3)
SODIUM SERPL-SCNC: 132 MMOL/L (ref 133–144)
SPECIMEN EXP DATE BLD: NORMAL

## 2021-06-23 PROCEDURE — 360N000075 HC SURGERY LEVEL 2, PER MIN: Performed by: SURGERY

## 2021-06-23 PROCEDURE — 76499 UNLISTED DX RADIOGRAPHIC PX: CPT

## 2021-06-23 PROCEDURE — 86901 BLOOD TYPING SEROLOGIC RH(D): CPT | Performed by: SURGERY

## 2021-06-23 PROCEDURE — 250N000009 HC RX 250: Performed by: SURGERY

## 2021-06-23 PROCEDURE — 258N000003 HC RX IP 258 OP 636: Performed by: ANESTHESIOLOGY

## 2021-06-23 PROCEDURE — 86900 BLOOD TYPING SEROLOGIC ABO: CPT | Performed by: SURGERY

## 2021-06-23 PROCEDURE — 36415 COLL VENOUS BLD VENIPUNCTURE: CPT | Performed by: ANESTHESIOLOGY

## 2021-06-23 PROCEDURE — 710N000010 HC RECOVERY PHASE 1, LEVEL 2, PER MIN: Performed by: SURGERY

## 2021-06-23 PROCEDURE — 86850 RBC ANTIBODY SCREEN: CPT | Performed by: SURGERY

## 2021-06-23 PROCEDURE — 255N000002 HC RX 255 OP 636: Performed by: SURGERY

## 2021-06-23 PROCEDURE — C1769 GUIDE WIRE: HCPCS

## 2021-06-23 PROCEDURE — 93922 UPR/L XTREMITY ART 2 LEVELS: CPT

## 2021-06-23 PROCEDURE — 258N000003 HC RX IP 258 OP 636: Performed by: NURSE ANESTHETIST, CERTIFIED REGISTERED

## 2021-06-23 PROCEDURE — 250N000011 HC RX IP 250 OP 636: Performed by: NURSE ANESTHETIST, CERTIFIED REGISTERED

## 2021-06-23 PROCEDURE — G0378 HOSPITAL OBSERVATION PER HR: HCPCS

## 2021-06-23 PROCEDURE — 84132 ASSAY OF SERUM POTASSIUM: CPT | Performed by: ANESTHESIOLOGY

## 2021-06-23 PROCEDURE — C1887 CATHETER, GUIDING: HCPCS | Performed by: SURGERY

## 2021-06-23 PROCEDURE — C1894 INTRO/SHEATH, NON-LASER: HCPCS | Performed by: SURGERY

## 2021-06-23 PROCEDURE — 272N000001 HC OR GENERAL SUPPLY STERILE: Performed by: SURGERY

## 2021-06-23 PROCEDURE — 250N000009 HC RX 250: Performed by: NURSE ANESTHETIST, CERTIFIED REGISTERED

## 2021-06-23 PROCEDURE — 999N000083 IR OR ANGIOGRAM

## 2021-06-23 PROCEDURE — 250N000025 HC SEVOFLURANE, PER MIN: Performed by: SURGERY

## 2021-06-23 PROCEDURE — 999N000141 HC STATISTIC PRE-PROCEDURE NURSING ASSESSMENT: Performed by: SURGERY

## 2021-06-23 PROCEDURE — 250N000013 HC RX MED GY IP 250 OP 250 PS 637: Performed by: SURGERY

## 2021-06-23 PROCEDURE — 84295 ASSAY OF SERUM SODIUM: CPT | Performed by: ANESTHESIOLOGY

## 2021-06-23 PROCEDURE — 75710 ARTERY X-RAYS ARM/LEG: CPT

## 2021-06-23 PROCEDURE — 250N000011 HC RX IP 250 OP 636: Performed by: ANESTHESIOLOGY

## 2021-06-23 PROCEDURE — 370N000017 HC ANESTHESIA TECHNICAL FEE, PER MIN: Performed by: SURGERY

## 2021-06-23 RX ORDER — SODIUM CHLORIDE, SODIUM LACTATE, POTASSIUM CHLORIDE, CALCIUM CHLORIDE 600; 310; 30; 20 MG/100ML; MG/100ML; MG/100ML; MG/100ML
INJECTION, SOLUTION INTRAVENOUS CONTINUOUS
Status: DISCONTINUED | OUTPATIENT
Start: 2021-06-23 | End: 2021-06-23 | Stop reason: HOSPADM

## 2021-06-23 RX ORDER — CARVEDILOL 6.25 MG/1
6.25 TABLET ORAL 2 TIMES DAILY WITH MEALS
Status: DISCONTINUED | OUTPATIENT
Start: 2021-06-23 | End: 2021-06-24 | Stop reason: HOSPADM

## 2021-06-23 RX ORDER — HYDROMORPHONE HYDROCHLORIDE 1 MG/ML
.3-.5 INJECTION, SOLUTION INTRAMUSCULAR; INTRAVENOUS; SUBCUTANEOUS EVERY 5 MIN PRN
Status: DISCONTINUED | OUTPATIENT
Start: 2021-06-23 | End: 2021-06-23 | Stop reason: HOSPADM

## 2021-06-23 RX ORDER — NALOXONE HYDROCHLORIDE 0.4 MG/ML
0.4 INJECTION, SOLUTION INTRAMUSCULAR; INTRAVENOUS; SUBCUTANEOUS
Status: DISCONTINUED | OUTPATIENT
Start: 2021-06-23 | End: 2021-06-24 | Stop reason: HOSPADM

## 2021-06-23 RX ORDER — LIDOCAINE HYDROCHLORIDE 20 MG/ML
INJECTION, SOLUTION INFILTRATION; PERINEURAL PRN
Status: DISCONTINUED | OUTPATIENT
Start: 2021-06-23 | End: 2021-06-23

## 2021-06-23 RX ORDER — CLINDAMYCIN PHOSPHATE 900 MG/50ML
900 INJECTION, SOLUTION INTRAVENOUS
Status: COMPLETED | OUTPATIENT
Start: 2021-06-23 | End: 2021-06-23

## 2021-06-23 RX ORDER — IOPAMIDOL 612 MG/ML
INJECTION, SOLUTION INTRAVASCULAR PRN
Status: DISCONTINUED | OUTPATIENT
Start: 2021-06-23 | End: 2021-06-23 | Stop reason: HOSPADM

## 2021-06-23 RX ORDER — BUPROPION HYDROCHLORIDE 150 MG/1
150 TABLET, EXTENDED RELEASE ORAL 2 TIMES DAILY
Status: DISCONTINUED | OUTPATIENT
Start: 2021-06-23 | End: 2021-06-24 | Stop reason: HOSPADM

## 2021-06-23 RX ORDER — NITROGLYCERIN 10 MG/100ML
INJECTION INTRAVENOUS PRN
Status: DISCONTINUED | OUTPATIENT
Start: 2021-06-23 | End: 2021-06-23

## 2021-06-23 RX ORDER — DEXAMETHASONE SODIUM PHOSPHATE 4 MG/ML
INJECTION, SOLUTION INTRA-ARTICULAR; INTRALESIONAL; INTRAMUSCULAR; INTRAVENOUS; SOFT TISSUE PRN
Status: DISCONTINUED | OUTPATIENT
Start: 2021-06-23 | End: 2021-06-23

## 2021-06-23 RX ORDER — HYDRALAZINE HYDROCHLORIDE 20 MG/ML
10 INJECTION INTRAMUSCULAR; INTRAVENOUS ONCE
Status: COMPLETED | OUTPATIENT
Start: 2021-06-23 | End: 2021-06-23

## 2021-06-23 RX ORDER — MAGNESIUM HYDROXIDE 1200 MG/15ML
LIQUID ORAL PRN
Status: DISCONTINUED | OUTPATIENT
Start: 2021-06-23 | End: 2021-06-23 | Stop reason: HOSPADM

## 2021-06-23 RX ORDER — OXYCODONE HYDROCHLORIDE 5 MG/1
5-10 TABLET ORAL
Status: DISCONTINUED | OUTPATIENT
Start: 2021-06-23 | End: 2021-06-24 | Stop reason: HOSPADM

## 2021-06-23 RX ORDER — ONDANSETRON 2 MG/ML
4 INJECTION INTRAMUSCULAR; INTRAVENOUS EVERY 6 HOURS PRN
Status: DISCONTINUED | OUTPATIENT
Start: 2021-06-23 | End: 2021-06-24 | Stop reason: HOSPADM

## 2021-06-23 RX ORDER — ONDANSETRON 2 MG/ML
INJECTION INTRAMUSCULAR; INTRAVENOUS PRN
Status: DISCONTINUED | OUTPATIENT
Start: 2021-06-23 | End: 2021-06-23

## 2021-06-23 RX ORDER — LIDOCAINE 40 MG/G
CREAM TOPICAL
Status: DISCONTINUED | OUTPATIENT
Start: 2021-06-23 | End: 2021-06-24 | Stop reason: HOSPADM

## 2021-06-23 RX ORDER — ONDANSETRON 4 MG/1
4 TABLET, ORALLY DISINTEGRATING ORAL EVERY 30 MIN PRN
Status: DISCONTINUED | OUTPATIENT
Start: 2021-06-23 | End: 2021-06-23 | Stop reason: HOSPADM

## 2021-06-23 RX ORDER — ACETAMINOPHEN 500 MG
500-1000 TABLET ORAL EVERY 6 HOURS PRN
Status: DISCONTINUED | OUTPATIENT
Start: 2021-06-23 | End: 2021-06-24 | Stop reason: HOSPADM

## 2021-06-23 RX ORDER — EPHEDRINE SULFATE 50 MG/ML
INJECTION, SOLUTION INTRAMUSCULAR; INTRAVENOUS; SUBCUTANEOUS PRN
Status: DISCONTINUED | OUTPATIENT
Start: 2021-06-23 | End: 2021-06-23

## 2021-06-23 RX ORDER — ONDANSETRON 2 MG/ML
4 INJECTION INTRAMUSCULAR; INTRAVENOUS EVERY 30 MIN PRN
Status: DISCONTINUED | OUTPATIENT
Start: 2021-06-23 | End: 2021-06-23 | Stop reason: HOSPADM

## 2021-06-23 RX ORDER — ONDANSETRON 4 MG/1
4 TABLET, ORALLY DISINTEGRATING ORAL EVERY 6 HOURS PRN
Status: DISCONTINUED | OUTPATIENT
Start: 2021-06-23 | End: 2021-06-24 | Stop reason: HOSPADM

## 2021-06-23 RX ORDER — FENTANYL CITRATE 50 UG/ML
25-50 INJECTION, SOLUTION INTRAMUSCULAR; INTRAVENOUS
Status: DISCONTINUED | OUTPATIENT
Start: 2021-06-23 | End: 2021-06-23 | Stop reason: HOSPADM

## 2021-06-23 RX ORDER — HEPARIN SODIUM 1000 [USP'U]/ML
INJECTION, SOLUTION INTRAVENOUS; SUBCUTANEOUS PRN
Status: DISCONTINUED | OUTPATIENT
Start: 2021-06-23 | End: 2021-06-23

## 2021-06-23 RX ORDER — FENTANYL CITRATE 50 UG/ML
INJECTION, SOLUTION INTRAMUSCULAR; INTRAVENOUS PRN
Status: DISCONTINUED | OUTPATIENT
Start: 2021-06-23 | End: 2021-06-23

## 2021-06-23 RX ORDER — NALOXONE HYDROCHLORIDE 0.4 MG/ML
0.2 INJECTION, SOLUTION INTRAMUSCULAR; INTRAVENOUS; SUBCUTANEOUS
Status: DISCONTINUED | OUTPATIENT
Start: 2021-06-23 | End: 2021-06-24 | Stop reason: HOSPADM

## 2021-06-23 RX ORDER — ROSUVASTATIN CALCIUM 20 MG/1
40 TABLET, COATED ORAL AT BEDTIME
Status: DISCONTINUED | OUTPATIENT
Start: 2021-06-23 | End: 2021-06-24 | Stop reason: HOSPADM

## 2021-06-23 RX ORDER — AMLODIPINE BESYLATE 5 MG/1
5 TABLET ORAL 2 TIMES DAILY
Status: DISCONTINUED | OUTPATIENT
Start: 2021-06-23 | End: 2021-06-24

## 2021-06-23 RX ORDER — ASPIRIN 81 MG/1
81 TABLET, CHEWABLE ORAL DAILY
Status: DISCONTINUED | OUTPATIENT
Start: 2021-06-24 | End: 2021-06-24 | Stop reason: HOSPADM

## 2021-06-23 RX ORDER — GLYCOPYRROLATE 0.2 MG/ML
INJECTION, SOLUTION INTRAMUSCULAR; INTRAVENOUS PRN
Status: DISCONTINUED | OUTPATIENT
Start: 2021-06-23 | End: 2021-06-23

## 2021-06-23 RX ORDER — CLOPIDOGREL BISULFATE 75 MG/1
75 TABLET ORAL DAILY
Status: DISCONTINUED | OUTPATIENT
Start: 2021-06-24 | End: 2021-06-24 | Stop reason: HOSPADM

## 2021-06-23 RX ORDER — PROPOFOL 10 MG/ML
INJECTION, EMULSION INTRAVENOUS PRN
Status: DISCONTINUED | OUTPATIENT
Start: 2021-06-23 | End: 2021-06-23

## 2021-06-23 RX ORDER — LISINOPRIL 20 MG/1
20 TABLET ORAL 2 TIMES DAILY
Status: DISCONTINUED | OUTPATIENT
Start: 2021-06-23 | End: 2021-06-24

## 2021-06-23 RX ORDER — NEOSTIGMINE METHYLSULFATE 1 MG/ML
VIAL (ML) INJECTION PRN
Status: DISCONTINUED | OUTPATIENT
Start: 2021-06-23 | End: 2021-06-23

## 2021-06-23 RX ADMIN — FENTANYL CITRATE 50 MCG: 50 INJECTION, SOLUTION INTRAMUSCULAR; INTRAVENOUS at 14:15

## 2021-06-23 RX ADMIN — DEXAMETHASONE SODIUM PHOSPHATE 4 MG: 4 INJECTION, SOLUTION INTRA-ARTICULAR; INTRALESIONAL; INTRAMUSCULAR; INTRAVENOUS; SOFT TISSUE at 14:45

## 2021-06-23 RX ADMIN — HYDROCORTISONE SODIUM SUCCINATE 100 MG: 100 INJECTION, POWDER, FOR SOLUTION INTRAMUSCULAR; INTRAVENOUS at 14:48

## 2021-06-23 RX ADMIN — FENTANYL CITRATE 50 MCG: 50 INJECTION, SOLUTION INTRAMUSCULAR; INTRAVENOUS at 14:06

## 2021-06-23 RX ADMIN — FENTANYL CITRATE 25 MCG: 0.05 INJECTION, SOLUTION INTRAMUSCULAR; INTRAVENOUS at 16:39

## 2021-06-23 RX ADMIN — CALCIUM CARBONATE 600 MG (1,500 MG)-VITAMIN D3 400 UNIT TABLET 1 TABLET: at 21:44

## 2021-06-23 RX ADMIN — PROPOFOL 160 MG: 10 INJECTION, EMULSION INTRAVENOUS at 14:06

## 2021-06-23 RX ADMIN — PHENYLEPHRINE HYDROCHLORIDE 0.25 MCG/KG/MIN: 10 INJECTION INTRAVENOUS at 14:40

## 2021-06-23 RX ADMIN — AMLODIPINE BESYLATE 5 MG: 5 TABLET ORAL at 21:43

## 2021-06-23 RX ADMIN — ROSUVASTATIN CALCIUM 40 MG: 20 TABLET, FILM COATED ORAL at 21:43

## 2021-06-23 RX ADMIN — HYDRALAZINE HYDROCHLORIDE 10 MG: 20 INJECTION INTRAMUSCULAR; INTRAVENOUS at 16:16

## 2021-06-23 RX ADMIN — DEXMEDETOMIDINE HYDROCHLORIDE 12 MCG: 100 INJECTION, SOLUTION INTRAVENOUS at 15:36

## 2021-06-23 RX ADMIN — CARVEDILOL 6.25 MG: 6.25 TABLET, FILM COATED ORAL at 21:43

## 2021-06-23 RX ADMIN — HYDROMORPHONE HYDROCHLORIDE 0.5 MG: 1 INJECTION, SOLUTION INTRAMUSCULAR; INTRAVENOUS; SUBCUTANEOUS at 17:03

## 2021-06-23 RX ADMIN — ONDANSETRON 4 MG: 2 INJECTION INTRAMUSCULAR; INTRAVENOUS at 14:45

## 2021-06-23 RX ADMIN — HEPARIN SODIUM 1000 UNITS: 1000 INJECTION INTRAVENOUS; SUBCUTANEOUS at 14:52

## 2021-06-23 RX ADMIN — NEOSTIGMINE METHYLSULFATE 3 MG: 1 INJECTION, SOLUTION INTRAVENOUS at 15:35

## 2021-06-23 RX ADMIN — GLYCOPYRROLATE 0.4 MG: 0.2 INJECTION, SOLUTION INTRAMUSCULAR; INTRAVENOUS at 15:35

## 2021-06-23 RX ADMIN — MIDAZOLAM 2 MG: 1 INJECTION INTRAMUSCULAR; INTRAVENOUS at 13:56

## 2021-06-23 RX ADMIN — NITROGLYCERIN 40 MCG: 10 INJECTION INTRAVENOUS at 15:34

## 2021-06-23 RX ADMIN — OXYCODONE HYDROCHLORIDE 5 MG: 5 TABLET ORAL at 21:50

## 2021-06-23 RX ADMIN — NITROGLYCERIN 40 MCG: 10 INJECTION INTRAVENOUS at 15:38

## 2021-06-23 RX ADMIN — HYDROMORPHONE HYDROCHLORIDE 0.5 MG: 1 INJECTION, SOLUTION INTRAMUSCULAR; INTRAVENOUS; SUBCUTANEOUS at 16:10

## 2021-06-23 RX ADMIN — LIDOCAINE HYDROCHLORIDE 80 MG: 20 INJECTION, SOLUTION INFILTRATION; PERINEURAL at 14:06

## 2021-06-23 RX ADMIN — BUPROPION HYDROCHLORIDE 150 MG: 150 TABLET, EXTENDED RELEASE ORAL at 21:44

## 2021-06-23 RX ADMIN — PROPOFOL 40 MG: 10 INJECTION, EMULSION INTRAVENOUS at 14:15

## 2021-06-23 RX ADMIN — PHENYLEPHRINE HYDROCHLORIDE 100 MCG: 10 INJECTION INTRAVENOUS at 14:26

## 2021-06-23 RX ADMIN — SODIUM CHLORIDE, POTASSIUM CHLORIDE, SODIUM LACTATE AND CALCIUM CHLORIDE: 600; 310; 30; 20 INJECTION, SOLUTION INTRAVENOUS at 12:29

## 2021-06-23 RX ADMIN — ROCURONIUM BROMIDE 50 MG: 10 INJECTION INTRAVENOUS at 14:06

## 2021-06-23 RX ADMIN — DEXMEDETOMIDINE HYDROCHLORIDE 8 MCG: 100 INJECTION, SOLUTION INTRAVENOUS at 15:34

## 2021-06-23 RX ADMIN — CLINDAMYCIN PHOSPHATE 900 MG: 900 INJECTION, SOLUTION INTRAVENOUS at 14:20

## 2021-06-23 RX ADMIN — Medication 5 MG: at 14:59

## 2021-06-23 RX ADMIN — Medication 5 MG: at 14:26

## 2021-06-23 ASSESSMENT — ENCOUNTER SYMPTOMS: DYSRHYTHMIAS: 1

## 2021-06-23 ASSESSMENT — COPD QUESTIONNAIRES
COPD: 1
CAT_SEVERITY: MILD

## 2021-06-23 ASSESSMENT — MIFFLIN-ST. JEOR: SCORE: 1003.99

## 2021-06-23 NOTE — ANESTHESIA PROCEDURE NOTES
Airway       Patient location during procedure: OR  Staff -        CRNA: Leonor Rose APRN CRNA       Performed By: CRNA  Consent for Airway        Urgency: elective  Indications and Patient Condition       Indications for airway management: lydia-procedural         Mask difficulty assessment: 1 - vent by mask    Final Airway Details       Final airway type: endotracheal airway       Successful airway: ETT - single  Endotracheal Airway Details        ETT size (mm): 7.0       Cuffed: yes       Cuff volume (mL): 10       Successful intubation technique: direct laryngoscopy       DL Blade Type: MAC 3       Grade View of Cords: 1       Adjucts: stylet       Position: Right       Measured from: gums/teeth       Secured at (cm): 21       Bite block used: None    Post intubation assessment        Placement verified by: capnometry, equal breath sounds and chest rise        Number of attempts at approach: 1       Secured with: pink tape       Ease of procedure: easy       Dentition: Intact and Unchanged    Medication(s) Administered   Medication Administration Time: 6/23/2021 2:08 PM

## 2021-06-23 NOTE — OR NURSING
Dr Hernandez and Dr Beach here - talking to her daughter Malu explaining procedure and discharge instructions per phone

## 2021-06-23 NOTE — ANESTHESIA PREPROCEDURE EVALUATION
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 Simons dermatology     Embolism and thrombosis of unspecified artery (H) 2000    Protein C,S, Leiden FV, Lupus Inhibitor Negative     Gastroesophageal reflux disease      Hyperlipidaemia      Hypertension      Lupus (H)     skin     Mild major depression (H) 2017     Myocardial infarction (H)     x3     Osteoarthrosis, unspecified whether generalized or localized, unspecified site      PAD (peripheral artery disease) (H)      Peripheral vascular disease, unspecified (H) 2000    s/p angioplasty with stent right femoral a.; Right iliac and femoral a. clot     Post-menopausal      Reflux esophagitis 2004    EGD: esophagitis and medium HH     SVT (supraventricular tachycardia) (H)      Thrombocytopenia (H)      Uncomplicated asthma      Vitamin C deficiency 2018      Past Surgical History:   Procedure Laterality Date     ANGIOGRAM       BYPASS GRAFT FEMOROPOPLITEAL Right 2020    Procedure: RIGHT FEMORAL GRAFT TO ABOVE-KNEE POPLITEAL BYPASS USING CADAVERIC SUPERFICIAL FEMORAL ARTERY;  Surgeon: Chadwick Lozano MD;  Location: [facility] OR     C FABRIC WRAPPING OF ABDOMINAL ANEURYSM       CARDIAC CATHERIZATION  2009    multivessel CAD without target lesions, med mgmt indicated, preserved ef     CARPAL TUNNEL RELEASE RT/LT Right 2021     ENDARTERECTOMY CAROTID Right 2016    Procedure: ENDARTERECTOMY CAROTID;  Surgeon: Blake  Chadwick Osman MD;  Location:  OR     ENDARTERECTOMY CAROTID Left 06/08/2020    Procedure: LEFT CAROTID ENDARTERECTOMY with distal facal vein patch  and EEG;  Surgeon: Chadwick Loznao MD;  Location:  OR     GYN SURGERY  left tube    left salpingectomy     IR LOWER EXTREMITY ANGIOGRAM RIGHT  05/25/2021     LAPAROSCOPIC CHOLECYSTECTOMY N/A 09/27/2017    Procedure: LAPAROSCOPIC CHOLECYSTECTOMY;  LAPAROSCOPIC CHOLECYSTECTOMY;  Surgeon: Jacoby Tapia MD;  Location:  [facility]     ORTHOPEDIC SURGERY      left knee surgery     VASCULAR SURGERY  aoto bi fem  left fem-AK pop     [facility] NONSPECIFIC PROCEDURE  12/2000    angioplasty with stent right fem. a.     [facility] NONSPECIFIC PROCEDURE  1987    sinus surgery     [facility] NONSPECIFIC PROCEDURE  09/02/2009    Emergent left groin exploration with oversewing of bleeding angiographic site. 2. Endarterectomy of common femoral-proximal superficial femoral artery with greater saphenous vein patch angioplasty.   a. [place] of accessory right greater saphenous vein.      [facility] NONSPECIFIC PROCEDURE  08/27/2009    occluded left common iliac and external iliac arteries were successfully revascularized with stenting to 8 and 7 mm       Allergies   Allergen Reactions     Contrast Dye Anaphylaxis     RASH, FACIAL AND NECK SWELLING, SOB, WHEEZING     Pantoprazole      Protonix caused diffuse edema     Chantix [Varenicline]      Terrible dreams     Penicillins Itching      Social History     Tobacco Use     Smoking status: Current Every Day Smoker     Packs/day: 1.00     Years: 52.00     Pack years: 52.00     Types: Cigarettes     Start date: 1960     Smokeless tobacco: Never Used     Tobacco comment: 1/2 PPD   Substance Use Topics     Alcohol use: Yes     Alcohol/week: 0.0 standard drinks     Comment: x1-2 yr      Wt Readings from Last 1 Encounters:   06/23/21 49.1 kg (108 lb 3 oz)        Anesthesia Evaluation            ROS/MED HX  ENT/Pulmonary:     (+) Mild Persistent, asthma  mild,  COPD,     Neurologic: Comment: CMT dz - neg neurologic ROS     Cardiovascular: Comment: PAD - s/p fem pop bypass 9/20. Now with symptomatic limb ischemia.     H/o SVT    S/p CEA    (+) Dyslipidemia hypertension-Peripheral Vascular Disease-- Symptomatic and Carotid Stenosis. CAD -past MI --Taking blood thinners dysrhythmias, Other, Previous cardiac testing   Echo: Date: Results:    Stress Test: Date: 4/16/21 Results:  Interpretation Summary     Suboptimal stress test due to inadequate maximum heart rate.  Normal left ventricular function and wall motion at rest and post-stress but  LV size and function were unchanged following limited exercise of 3 minutes.  Post-exercise images were obtained with the heart rate in the 70's bpm.  Blood pressure seth appropriately from 140/78 mmHg (supine, baseline) to  158/80 mmHg during Stage I and fell to 134/74 mmHg in recovery. Consider  Lexiscan stress imaging (nuclear, MRI) for adequate testing.  ECG Reviewed: Date: Results:    Cath: Date: Results:      METS/Exercise Tolerance:     Hematologic: Comments: Arterial clots    thrombocytopenia    (+) History of blood clots, pt is anticoagulated,     Musculoskeletal:       GI/Hepatic:     (+) GERD,     Renal/Genitourinary:  - neg Renal ROS     Endo:  - neg endo ROS     Psychiatric/Substance Use:       Infectious Disease:       Malignancy:       Other:            Physical Exam    Airway        Mallampati: II   TM distance: > 3 FB   Neck ROM: full   Mouth opening: > 3 cm    Respiratory Devices and Support         Dental  no notable dental history         Cardiovascular   cardiovascular exam normal          Pulmonary   pulmonary exam normal                OUTSIDE LABS:  CBC:   Lab Results   Component Value Date    WBC 4.8 06/21/2021    WBC 7.8 05/25/2021    HGB 12.4 06/21/2021    HGB 11.9 05/25/2021    HCT 37.7 06/21/2021    HCT 35.4 05/25/2021     (L) 06/21/2021     (L) 05/25/2021     BMP:   Lab Results   Component  Value Date     (L) 06/21/2021     01/05/2021    POTASSIUM 4.6 06/21/2021    POTASSIUM 4.4 01/05/2021    CHLORIDE 100 06/21/2021    CHLORIDE 104 01/05/2021    CO2 30 06/21/2021    CO2 27 01/05/2021    BUN 13 06/21/2021    BUN 11 01/05/2021    CR 0.65 06/21/2021    CR 0.58 01/05/2021    GLC 78 06/21/2021    GLC 89 01/05/2021     COAGS:   Lab Results   Component Value Date    PTT 25 04/21/2016    INR 1.01 09/24/2020     POC:   Lab Results   Component Value Date    BGM 77 09/25/2020     HEPATIC:   Lab Results   Component Value Date    ALBUMIN 3.6 01/05/2021    PROTTOTAL 7.2 01/05/2021    ALT 25 01/05/2021    AST 17 01/05/2021    ALKPHOS 84 01/05/2021    BILITOTAL 0.3 01/05/2021     OTHER:   Lab Results   Component Value Date    PH 7.42 03/19/2010    A1C 5.4 09/23/2020    SONIA 8.7 06/21/2021    PHOS 4.3 09/13/2018    MAG 1.8 09/13/2018    LIPASE 63 (L) 06/10/2020    AMYLASE 46 08/14/2017    TSH 0.77 01/05/2021    T4 1.19 03/28/2017    CRP <2.9 09/18/2014    SED 15 01/05/2021       Anesthesia Plan    ASA Status:  3   NPO Status:  NPO Appropriate    Anesthesia Type: General.     - Airway: ETT   Induction: Intravenous, Propofol.   Maintenance: Balanced.        Consents    Anesthesia Plan(s) and associated risks, benefits, and realistic alternatives discussed. Questions answered and patient/representative(s) expressed understanding.     - Discussed with:  Patient         Postoperative Care    Pain management: IV analgesics.   PONV prophylaxis: Ondansetron (or other 5HT-3), Dexamethasone or Solumedrol     Comments:                Randal Perez DO, DO

## 2021-06-23 NOTE — ANESTHESIA PROCEDURE NOTES
Arterial Line Procedure Note  Pre-Procedure   Staff -        Anesthesiologist:  Randal Perez DO       Performed By: anesthesiologist       Location: pre-op       Pre-Anesthestic Checklist: patient identified, IV checked, risks and benefits discussed, informed consent, monitors and equipment checked, pre-op evaluation and at physician/surgeon's request  Timeout:       Correct Patient: Yes        Correct Procedure: Yes        Correct Site: Yes        Correct Position: Yes   Procedure   Procedure: arterial line       Laterality: right       Insertion Site: brachial.  Sterile Prep        All elements of maximal sterile barrier technique followed       Patient Prep/Sterile Barriers: hand hygiene, sterile gloves, hat, mask, draped, sterile gown, draped       Skin prep: Chloraprep  Insertion/Injection        Technique: Seldinger Technique and ultrasound guided (no image saved)        1. Ultrasound was used to evaluate the access site.       2. Artery evaluated via ultrasound for patency/adequacy.       3. Using real-time ultrasound the needle/catheter was observed entering the artery/vein.       5. The visualized structures were anatomically normal.       6. There were no apparent abnormal pathologic findings.       Catheter Type/Size: 20 gauge, 1.75 in/4.5 cm quick cath (integral wire)  Narrative        Tegaderm dressing used.       Arterial waveform: Yes        IBP within 10% of NIBP: Yes

## 2021-06-23 NOTE — BRIEF OP NOTE
[facility]    Brief Operative Note    Pre-operative diagnosis: Right lower extremity critical lower limb ischemia [I99.8]  Post-operative diagnosis Same as pre-operative diagnosis    Procedure: Procedure(s):  RIGHT LOWER EXTREMITY ANGIOGRAM WITH LEFT BRACHIAL ARTERY CUTDOWN     Surgeon: Surgeon(s) and Role:     * José Luis Hernandez MD - Primary   Justice Beach, Vascular Surgery Fellow     Anesthesia: General   Estimated blood loss: 25 mL   Drains: None  Specimens: * No specimens in log *  Findings:  Patent previous right cadaveric superficial femoral artery bypass. Single vessel runoff to the foot.     Complications: None.  Implants: * No implants in log *

## 2021-06-23 NOTE — ANESTHESIA CARE TRANSFER NOTE
Patient: Shirley Hendricks    Procedure(s):  RIGHT LOWER EXTREMITY ANGIOGRAM WITH LEFT BRACHIAL ARTERY CUTDOWN    Diagnosis: Critical lower limb ischemia [I99.8]  Diagnosis Additional Information: No value filed.    Anesthesia Type:   General     Note:      Level of Consciousness: awake  Oxygen Supplementation: face mask    Independent Airway: airway patency satisfactory and stable        Patient transferred to: PACU  Comments: Neuromuscular blockade reversed after TOF 4/4, spontaneous respirations, adequate tidal volumes, followed commands to voice, oropharynx suctioned with soft flexible catheter, extubated atraumatically, extubated with suction, airway patent after extubation.  Oxygen via facemask at 8 liters per minute to PACU. Oxygen tubing connected to wall O2 in PACU, SpO2, NiBP, and EKG monitors and alarms on and functioning, Heydi Hugger warmer connected to patient gown, report on patient's clinical status given to PACU RN, RN questions answered.     Handoff Report: Identifed the Patient, Identified the Reponsible Provider, Reviewed the pertinent medical history, Discussed the surgical course, Reviewed Intra-OP anesthesia mangement and issues during anesthesia, Set expectations for post-procedure period and Allowed opportunity for questions and acknowledgement of understanding      Vitals: (Last set prior to Anesthesia Care Transfer)  CRNA VITALS  6/23/2021 1523 - 6/23/2021 1553      6/23/2021             Pulse:  72    ART BP:  198/84    ART Mean:  131    SpO2:  100 %    Resp Rate (observed):  8    Resp Rate (set):  10        Electronically Signed By: NOELLE Jackson CRNA  June 23, 2021  3:53 PM

## 2021-06-24 ENCOUNTER — TELEPHONE (OUTPATIENT)
Dept: OTHER | Facility: CLINIC | Age: 61
End: 2021-06-24

## 2021-06-24 VITALS
RESPIRATION RATE: 16 BRPM | OXYGEN SATURATION: 97 % | HEIGHT: 62 IN | HEART RATE: 56 BPM | DIASTOLIC BLOOD PRESSURE: 57 MMHG | BODY MASS INDEX: 19.91 KG/M2 | WEIGHT: 108.19 LBS | SYSTOLIC BLOOD PRESSURE: 105 MMHG | TEMPERATURE: 97.8 F

## 2021-06-24 DIAGNOSIS — Z98.890 CRITICAL ISCHEMIA OF EXTREMITY WITH HISTORY OF REVASCULARIZATION OF SAME EXTREMITY (H): Primary | ICD-10-CM

## 2021-06-24 DIAGNOSIS — I70.229 CRITICAL LOWER LIMB ISCHEMIA (H): Primary | ICD-10-CM

## 2021-06-24 DIAGNOSIS — I70.229 CRITICAL ISCHEMIA OF EXTREMITY WITH HISTORY OF REVASCULARIZATION OF SAME EXTREMITY (H): Primary | ICD-10-CM

## 2021-06-24 LAB — GLUCOSE BLDC GLUCOMTR-MCNC: 100 MG/DL (ref 70–99)

## 2021-06-24 PROCEDURE — G0378 HOSPITAL OBSERVATION PER HR: HCPCS

## 2021-06-24 PROCEDURE — 999N001017 HC STATISTIC GLUCOSE BY METER IP

## 2021-06-24 PROCEDURE — 250N000013 HC RX MED GY IP 250 OP 250 PS 637: Performed by: SURGERY

## 2021-06-24 RX ORDER — NICOTINE 21 MG/24HR
1 PATCH, TRANSDERMAL 24 HOURS TRANSDERMAL DAILY
Status: DISCONTINUED | OUTPATIENT
Start: 2021-06-24 | End: 2021-06-24 | Stop reason: HOSPADM

## 2021-06-24 RX ORDER — OXYCODONE HYDROCHLORIDE 5 MG/1
5 TABLET ORAL EVERY 6 HOURS PRN
Qty: 12 TABLET | Refills: 0 | Status: SHIPPED | OUTPATIENT
Start: 2021-06-24 | End: 2021-07-01

## 2021-06-24 RX ORDER — LISINOPRIL 10 MG/1
10 TABLET ORAL 2 TIMES DAILY
Status: DISCONTINUED | OUTPATIENT
Start: 2021-06-24 | End: 2021-06-24 | Stop reason: HOSPADM

## 2021-06-24 RX ADMIN — OMEPRAZOLE 20 MG: 20 CAPSULE, DELAYED RELEASE ORAL at 08:16

## 2021-06-24 RX ADMIN — OXYCODONE HYDROCHLORIDE 5 MG: 5 TABLET ORAL at 02:21

## 2021-06-24 RX ADMIN — NICOTINE 1 PATCH: 21 PATCH, EXTENDED RELEASE TRANSDERMAL at 02:21

## 2021-06-24 RX ADMIN — ASPIRIN 81 MG CHEWABLE TABLET 81 MG: 81 TABLET CHEWABLE at 08:16

## 2021-06-24 RX ADMIN — OXYCODONE HYDROCHLORIDE 5 MG: 5 TABLET ORAL at 06:02

## 2021-06-24 RX ADMIN — BUPROPION HYDROCHLORIDE 150 MG: 150 TABLET, EXTENDED RELEASE ORAL at 08:16

## 2021-06-24 RX ADMIN — CARVEDILOL 6.25 MG: 6.25 TABLET, FILM COATED ORAL at 08:17

## 2021-06-24 RX ADMIN — CLOPIDOGREL BISULFATE 75 MG: 75 TABLET ORAL at 08:16

## 2021-06-24 NOTE — PLAN OF CARE
Pt is A&OX4, VSS on RA, SBA in room w/ GB, Reg diet, Dressing at R-arm C/D/I, Vascular consulted and following out pt in 2 weeks. Reports of mild pain, denies intervention, pt AVS printed and given, Meds are sent to home pharmacy, Pt left the floor at 1050, family member providing transportation home

## 2021-06-24 NOTE — DISCHARGE INSTRUCTIONS
Peripheral Angiogram Discharge Instructions - Brachial    After you go home:      Have an adult stay with you until tomorrow.    Drink extra fluids for 2 days.    You may resume your normal diet.    No smoking       For 24 hours - due to the sedation you received:    Relax and take it easy.    Do NOT make any important or legal decisions.    Do NOT drive or operate machines at home or at work.    Do NOT drink alcohol.    Care of Arm Incision:      For the first 24 hrs - check the incision every 1-2 hours while awake.    Remove the bandaid after 24 hours. If there is minor oozing, apply another bandaid and remove it after 12 hours.    It is normal to have a small bruise or pea size lump at the site.    You may shower tomorrow. Do NOT take a bath, or use a hot tub or pool for at least 3 days. Do NOT scrub the site. Do not use lotion or powder near the incision.     Activity:      For 2 days, do not use your hand or arm to support your weight (such as rising from a chair)     For 2 days, do not lift more than 5 pounds or exercise your arm (such as tennis, golf or bowling).    Bleeding:      If you start bleeding from the incision in your arm, sit down and press firmly on/above the site for 10 minutes.     Once bleeding stops, keep your arm straight for 2 hours.     Call the [facility] as soon as you can.       Call 911 right away if you have heavy bleeding or bleeding that does not stop.      Medicines:      If you are on Metformin (Glucophage) and your GFR (kidney function level) is >30, you may continue taking your Metformin.    If you are on Metformin (Glucophage) and your GFR (kidney function level) is <30, do not restart the Metformin for 48 hours after your procedure. Check with your primary care giver before restarting the Metformin to see if you need to have blood drawn to recheck your kidney function (GFR).    If you are taking an antiplatelet medication such as Plavix, do not stop taking it until  you talk to your provider.       Take your medications, including blood thinners, unless your provider tells you not to.      If you take Coumadin (Warfarin), have your INR checked by your provider in  3-5 days. Call your clinic to schedule this.    If you have stopped any medicines, check with your provider about when to restart them.               Follow Up Appointments:      Follow up with [facility] as directed.    Call the clinic if:      You have increased pain or a large or growing hard lump around the incision.    The incision area is red, swollen, hot or tender.    Blood or fluid is draining from the incision.    You have chills or a fever greater than 101 F (38 C).    Your arm feels numb, cool or changes color.    You have hives, a rash or unusual itching.    Any questions or concerns.    Other Instructions:      If you received a stent - carry your stent card with you at all times.      If you have questions or your original symptoms do not improve, call:             [facility] @ [phone]                  Same Day Surgery Discharge Instructions for  Sedation and General Anesthesia       It's not unusual to feel dizzy, light-headed or faint for up to 24 hours after surgery or while taking pain medication.  If you have these symptoms: sit for a few minutes before standing and have someone assist you when you get up to walk or use the bathroom.      You should rest and relax for the next 24 hours. We recommend you make arrangements to have an adult stay with you for at least 24 hours after your discharge.  Avoid hazardous and strenuous activity.      DO NOT DRIVE any vehicle or operate mechanical equipment for 24 hours following the end of your surgery.  Even though you may feel normal, your reactions may be affected by the medication you have received.      Do not drink alcoholic beverages for 24 hours following surgery.       Slowly progress to your regular diet as you feel  able. It's not unusual to feel nauseated and/or vomit after receiving anesthesia.  If you develop these symptoms, drink clear liquids (apple juice, ginger ale, broth, 7-up, etc. ) until you feel better.  If your nausea and vomiting persists for 24 hours, please notify your surgeon.        All narcotic pain medications, along with inactivity and anesthesia, can cause constipation. Drinking plenty of liquids and increasing fiber intake will help.      For any questions of a medical nature, call your surgeon.      Do not make important decisions for 24 hours.      If you had general anesthesia, you may have a sore throat for a couple of days related to the breathing tube used during surgery.  You may use Cepacol lozenges to help with this discomfort.  If it worsens or if you develop a fever, contact your surgeon.       If you feel your pain is not well managed with the pain medications prescribed by your surgeon, please contact your surgeon's office to let them know so they can address your concerns.       CoVid 19 Information    We want to give you information regarding Covid. Please consult your primary care provider with any questions you might have.     Patient who have symptoms (cough, fever, or shortness of breath), need to isolate for 7 days from when symptoms started OR 72 hours after fever resolves (without fever reducing medications) AND improvement of respiratory symptoms (whichever is longer).      Isolate yourself at home (in own room/own bathroom if possible)    Do Not allow any visitors    Do Not go to work or school    Do Not go to [employer],  centers, shopping, or other public places.    Do Not shake hands.    Avoid close and intimate contact with others (hugging, kissing).    Follow CDC recommendations for household cleaning of frequently touched services.     After the initial 7 days, continue to isolate yourself from household members as much as possible. To continue decrease the risk of  community spread and exposure, you and any members of your household should limit activities in public for 14 days after starting home isolation.     You can reference the following CDC link for helpful home isolation/care tips:  https://www.cdc.gov/coronavirus/2019-ncov/downloads/10Things.pdf    Protect Others:    Cover Your Mouth and Nose with a mask, disposable tissue or wash cloth to avoid spreading germs to others.    Wash your hands and face frequently with soap and water    Call Your Primary Doctor If: Breathing difficulty develops or you become worse.    For more information about COVID19 and options for caring for yourself at home, please visit the CDC website at https://www.cdc.gov/coronavirus/2019-ncov/about/steps-when-sick.html  For more options for care at [facility], please visit our website at [email or web address]    **If you have questions or concerns about your procedure  call Dr. Hernandez at [phone]**

## 2021-06-24 NOTE — PLAN OF CARE
A&O x4. VSS on RA, afebrile. Right UE dressing CDI, CMS intact except for numbness and tingling - pt's baseline. Pain 8/10 in RUE and alvaro LE, some relief with prn oxy and ambulating. Alvaro LE numbness and tingling, pulses with doppler. Nicotine patch right upper arm. Voiding. Reg diet. Plan to discharge today.

## 2021-06-24 NOTE — PROVIDER NOTIFICATION
MD Notification    Notified Person: MD    Notified Person Name: Dr. Beach     Notification Date/Time: 06/23/2021    Notification Interaction: phone    Purpose of Notification: requests nicotine patch 21mg    Orders Received:    Comments:  Dr Beach called back around 2 am. Verbal orders with read back for a Nicotine patch 21mg/24hr.

## 2021-06-24 NOTE — OP NOTE
Preoperative diagnosis: Right lower extremity critical limb ischemia, recurrent.    Postoperative diagnosis: Same.    Procedure:  1.  Left distal brachial artery cutdown with primary repair.  2.  Selective catheterization of aorta, right limb of aortobifemoral bypass graft and common femoral artery.  3.  Right lower extremity arteriogram with runoff.    Surgeon: José Luis Hernandez M.D.  Assistant: Justice Beach M.D.    Anesthesia: General.  Estimated blood loss: Minimal.  Contrast: 33 mL.  Fluoroscopy time: 2.6 minutes.  Fluoroscopy dose:18.9 mGy.     Indication for procedure: This was a 60-year-old female who has premature atherosclerosis.  Previously she had undergone aortoiliac stenting which failed and she ended up requiring an aortobifemoral bypass graft.  She has had a previous left femoropopliteal bypass grafting.  She developed lifestyle limiting claudication and in September 2020 she underwent right femoral-popliteal bypass with cadaveric superficial femoral artery.  On repeat surveillance imaging in May of this year she was found to have occlusion of the native popliteal artery.  She underwent endovascular recanalization and balloon angioplasty.  Unfortunately this caused embolization and occlusion of the native posterior tibial artery which was one of the main runoff of the bypass.  She was recently seen in the clinic by myself and has recurrent short distance claudication and nocturnal rest pain.  Right ankle-brachial index is now 0.4.  Arteriogram and possible bypass are advised.    Procedure details: Patient was identified in the taken to the operating room and placed in supine position.  General anesthesia was induced.  Preoperative intravenous antibiotics were administered.  The entire right lower extremity and left upper extremity were prepped and a sterile surgical field was created.  We had marked out the left cephalic vein in the upper arm.  Preprocedure timeout was conducted.    A small incision was  made in the distal arm overlying the left distal brachial artery.  Artery was dissected out and placed in Vesseloops.  4000 units of heparin were given.  The artery was accessed retrograde with a micropuncture needle followed by placement of a microwire which was then converted to a 0.035 inch wire system and a short 5 Georgian sheath was placed.  Then a 0.014 inch stiff Glidewire was advanced into the aortic arch and then from there into the descending thoracic aorta followed by a angled Navicross catheter.    Catheter was then advanced through the right limb of the aortobifemoral bypass graft into the right common femoral artery.  Arteriogram showed that the right limb of the aortobifemoral bypass was patent.  Right common femoral deep femoral arteries were patent.  Native superficial femoral artery is occluded.  The femoropopliteal bypass itself was patent but the native popliteal artery on the distal anastomosis was occluded and multiple distal thigh collaterals were reconstituting the below-knee popliteal artery.  The posterior tibial artery was completely occluded with the runoff only being the anterior tibial and peroneal arteries.    At this point I decided to stop the procedure and not proceed with the bypass.    The sheath was removed from the left distal brachial artery and arteriotomy was closed with figure-of-eight suture of 7-0 Prolene to achieve good hemostasis.  There was strong triphasic signal in the brachial artery distal to the repair.  Wound was closed in layers with 3-0 Vicryl and 4-0 Monocryl.    Counts of instruments, sponges and needles were noted to be correct.    Patient was awakened and extubated and taken to the recovery room in stable condition.    I called the patient's daughter and sister and updated them on our progress.    Our plan is to proceed with above-knee cadaveric bypass to below-knee popliteal artery bypass with a left upper extremity cephalic vein conduit.  However, all the  best patency she has to be of morphine for 2 weeks and we will also do walking exercises to improve her claudication distance prior to surgical intervention.  This was given to the family and to the patient.

## 2021-06-24 NOTE — TELEPHONE ENCOUNTER
Please contact patient to schedule 2 week post op appointment with Dr. Hernandez.    Routing to scheduling.    Appt note:  1st PO - s/p right lower extremity angiogram via left brachial artery cut down on 6/23/21.    ESTHER PabonN, RN-[facility] [place]

## 2021-06-24 NOTE — DISCHARGE SUMMARY
"VASCULAR SURGERY PROGRESS NOTE    Subjective:  Overall doing well. Complaining of right foot pain.     Objective:    Intake/Output Summary (Last 24 hours) at 6/24/2021 0814  Last data filed at 6/24/2021 0600  Gross per 24 hour   Intake 700 ml   Output 1125 ml   Net -425 ml     PHYSICAL EXAM:  /57 (BP Location: Left leg)   Pulse 56   Temp 97.8  F (36.6  C) (Oral)   Resp 16   Ht 1.575 m (5' 2\")   Wt 49.1 kg (108 lb 3 oz)   SpO2 97%   BMI 19.79 kg/m    General: The patient is alert and oriented. Appropriate. No acute distress  Respiratory: Unlabored on room air  Cardio: Regular rate and rhythm     Extremities: left upper extremity dressing in place; palpable left radial pulse         ASSESSMENT:  59 yo F s/p left brachial artery cut down and right lower extremity angiogram for right lower extremity critical limb threatening ischemia on 6/23/2021.       PLAN:  - Continue aspirin, plavix  - Will discuss with patient importance of smoking cessation  - Blood pressure medications adjusted to carvedilol and lisinopril   - Nicotine patch   - Plan for discharge later today with follow-up in two weeks     Justice Beach MD  Division of Vascular Surgery   Pager: [phone]                  "

## 2021-06-24 NOTE — ANESTHESIA POSTPROCEDURE EVALUATION
Patient: Shirley Hendricks    Procedure(s):  RIGHT LOWER EXTREMITY ANGIOGRAM WITH LEFT BRACHIAL ARTERY CUTDOWN    Diagnosis:Critical lower limb ischemia [I99.8]  Diagnosis Additional Information: No value filed.    Anesthesia Type:  General    Note:  Disposition: Admission   Postop Pain Control: Uneventful            Sign Out: Well controlled pain   PONV: No   Neuro/Psych: Uneventful            Sign Out: Acceptable/Baseline neuro status   Airway/Respiratory: Uneventful            Sign Out: Acceptable/Baseline resp. status   CV/Hemodynamics: Uneventful            Sign Out: Acceptable CV status; No obvious hypovolemia; No obvious fluid overload   Other NRE: NONE   DID A NON-ROUTINE EVENT OCCUR? No           Last vitals:  Vitals:    06/23/21 1850 06/23/21 1900 06/23/21 1910   BP: 108/54 122/59    Pulse: 56 60 58   Resp: 8 8 8   Temp:      SpO2: 95% 96% 95%       Last vitals prior to Anesthesia Care Transfer:  CRNA VITALS  6/23/2021 1523 - 6/23/2021 1623      6/23/2021             Pulse:  72    ART BP:  (!) 204/95    ART Mean:  131    SpO2:  100 %    Resp Rate (observed):  8    Resp Rate (set):  10          Electronically Signed By: Randal Perez DO, DO  June 23, 2021  7:46 PM

## 2021-06-25 ENCOUNTER — TELEPHONE (OUTPATIENT)
Dept: OTHER | Facility: CLINIC | Age: 61
End: 2021-06-25

## 2021-06-25 DIAGNOSIS — Z11.59 ENCOUNTER FOR SCREENING FOR OTHER VIRAL DISEASES: ICD-10-CM

## 2021-06-25 NOTE — TELEPHONE ENCOUNTER
Patient is now scheduled for leg bypass on 7/7/21.  Per Dr. Beach, no post op appointment needed.    Alaina Yanez, ESTHERN, RN-[facility] [place]

## 2021-06-25 NOTE — TELEPHONE ENCOUNTER
Left message on home number for patient to call back to schedule surgery on 7/7 with Dr. Hernandez

## 2021-07-01 DIAGNOSIS — I10 ESSENTIAL HYPERTENSION: ICD-10-CM

## 2021-07-01 RX ORDER — AMLODIPINE BESYLATE 5 MG/1
5 TABLET ORAL 2 TIMES DAILY
Qty: 180 TABLET | Refills: 3 | Status: SHIPPED | OUTPATIENT
Start: 2021-07-01 | End: 2021-07-06

## 2021-07-03 DIAGNOSIS — Z11.59 ENCOUNTER FOR SCREENING FOR OTHER VIRAL DISEASES: ICD-10-CM

## 2021-07-03 LAB
SARS-COV-2 RNA RESP QL NAA+PROBE: NORMAL
SPECIMEN SOURCE: NORMAL

## 2021-07-03 PROCEDURE — U0005 INFEC AGEN DETEC AMPLI PROBE: HCPCS | Performed by: SURGERY

## 2021-07-03 PROCEDURE — U0003 INFECTIOUS AGENT DETECTION BY NUCLEIC ACID (DNA OR RNA); SEVERE ACUTE RESPIRATORY SYNDROME CORONAVIRUS 2 (SARS-COV-2) (CORONAVIRUS DISEASE [COVID-19]), AMPLIFIED PROBE TECHNIQUE, MAKING USE OF HIGH THROUGHPUT TECHNOLOGIES AS DESCRIBED BY CMS-2020-01-R: HCPCS | Performed by: SURGERY

## 2021-07-04 LAB
LABORATORY COMMENT REPORT: NORMAL
SARS-COV-2 RNA RESP QL NAA+PROBE: NEGATIVE
SPECIMEN SOURCE: NORMAL

## 2021-07-06 RX ORDER — LISINOPRIL 20 MG/1
20 TABLET ORAL 2 TIMES DAILY
COMMUNITY
End: 2022-03-29

## 2021-07-06 RX ORDER — DIPHENHYDRAMINE HCL 25 MG
25-50 TABLET ORAL
COMMUNITY
End: 2021-08-10

## 2021-07-06 RX ORDER — CARVEDILOL 6.25 MG/1
6.25 TABLET ORAL 2 TIMES DAILY WITH MEALS
COMMUNITY
End: 2022-04-13

## 2021-07-06 NOTE — PROGRESS NOTES
PTA medications updated by Medication Scribe prior to surgery via phone call with patient (last doses completed by Nurse)     Medication history sources: Patient, Surescripts and H&P  In the past week, patient estimated taking medication this percent of the time: Greater than 90%  Adherence assessment: N/A Not Observed    Significant changes made to the medication list:  Patient reports no longer taking the following meds (med scribe removed from PTA med list): Amlodipine, Zyban      Additional medication history information:   Patient brought own home meds: Breo Ellipta Inhaler    Medication reconciliation completed by provider prior to medication history? No    Time spent in this activity: 25 minutes    The information provided in this note is only as accurate as the sources available at the time of update(s)      Prior to Admission medications    Medication Sig Last Dose Taking? Auth Provider   acetaminophen (TYLENOL) 500 MG tablet Take 500-1,000 mg by mouth every 6 hours as needed for mild pain 7/6/2021 at PRN Yes Reported, Patient   aspirin (ASA) 81 MG chewable tablet Take 1 tablet (81 mg) by mouth daily MORE THAN THREE WEEKS Yes Chadwick Lozano   Calcium Carbonate-Vitamin D3 (CALCIUM 600-D) 600-400 MG-UNIT TABS Take 1 tablet by mouth every evening 7/6/2021 at PM Yes Reported, Patient   carvedilol (COREG) 6.25 MG tablet Take 6.25 mg by mouth 2 times daily (with meals) 7/6/2021 at AM Yes Reported, Patient   clopidogrel (PLAVIX) 75 MG tablet Take 1 tablet (75 mg) by mouth daily 7/6/2021 at AM Yes Vasquez Benoit MD   denosumab (PROLIA) 60 MG/ML SOLN injection Inject 1 mL (60 mg) Subcutaneous every 6 months INDICATION: TO TREAT OSTEOPOROSIS  Yes Vasquez Benoit MD   diphenhydrAMINE (BENADRYL) 25 MG tablet Take 25-50 mg by mouth once as needed for itching or allergies (30 minutes to 2 hours before procedure using contrast)  at PRN Yes Reported, Patient   fluticasone-vilanterol (BREO ELLIPTA) 200-25 MCG/INH  inhaler Inhale 1 puff into the lungs daily 7/6/2021 at AM Yes Reported, Patient   lisinopril (ZESTRIL) 20 MG tablet Take 20 mg by mouth every morning 7/6/2021 at AM Yes Reported, Patient   methylPREDNISolone (MEDROL) 16 MG tablet Take 32 mg by mouth 12 hours before the procedure and repeat 32 mg by mouth 2 hours before the procedure to prevent contrast reaction.  at PRN Yes Chadwick Lozano   nicotine (NICODERM CQ) 14 MG/24HR 24 hr patch Place 1 patch onto the skin every 24 hours  Yes Ailin Nichols PA-C   nicotine (NICODERM CQ) 21 MG/24HR 24 hr patch Place 1 patch onto the skin every 24 hours 7/6/2021 at AM Yes Vasquez Benoit MD   nicotine (NICODERM CQ) 7 MG/24HR 24 hr patch Place 1 patch onto the skin every 24 hours Start after completing 14 mg patches.  Yes Ailin Nichols PA-C   nitroGLYcerin (NITROSTAT) 0.4 MG sublingual tablet Place 1 tablet (0.4 mg) under the tongue See Admin Instructions for chest pain  Yes Vasquez Benoit MD   omeprazole (PRILOSEC) 20 MG DR capsule Take 20 mg by mouth every morning 7/6/2021 at AM Yes Reported, Patient   rosuvastatin (CRESTOR) 40 MG tablet Take 1 tablet (40 mg) by mouth At Bedtime 7/6/2021 at PM Yes Vasquez Benoit MD Gerard Burgess, [facility]  Medication [facility]

## 2021-07-07 ENCOUNTER — ANESTHESIA EVENT (OUTPATIENT)
Dept: SURGERY | Facility: CLINIC | Age: 61
DRG: 253 | End: 2021-07-07
Payer: COMMERCIAL

## 2021-07-07 ENCOUNTER — ANESTHESIA (OUTPATIENT)
Dept: SURGERY | Facility: CLINIC | Age: 61
DRG: 253 | End: 2021-07-07
Payer: COMMERCIAL

## 2021-07-07 ENCOUNTER — HOSPITAL ENCOUNTER (INPATIENT)
Facility: CLINIC | Age: 61
LOS: 2 days | Discharge: HOME | DRG: 253 | End: 2021-07-09
Attending: SURGERY | Admitting: SURGERY
Payer: COMMERCIAL

## 2021-07-07 ENCOUNTER — APPOINTMENT (OUTPATIENT)
Dept: SURGERY | Facility: PHYSICIAN GROUP | Age: 61
End: 2021-07-07
Payer: COMMERCIAL

## 2021-07-07 DIAGNOSIS — Z98.890 CRITICAL ISCHEMIA OF EXTREMITY WITH HISTORY OF REVASCULARIZATION OF SAME EXTREMITY (H): Primary | ICD-10-CM

## 2021-07-07 DIAGNOSIS — I70.229 CRITICAL ISCHEMIA OF EXTREMITY WITH HISTORY OF REVASCULARIZATION OF SAME EXTREMITY (H): Primary | ICD-10-CM

## 2021-07-07 DIAGNOSIS — I70.229 CRITICAL ISCHEMIA OF EXTREMITY WITH HISTORY OF REVASCULARIZATION OF SAME EXTREMITY (H): ICD-10-CM

## 2021-07-07 DIAGNOSIS — Z98.890 CRITICAL ISCHEMIA OF EXTREMITY WITH HISTORY OF REVASCULARIZATION OF SAME EXTREMITY (H): ICD-10-CM

## 2021-07-07 LAB
ABO + RH BLD: NORMAL
ABO + RH BLD: NORMAL
BLD GP AB SCN SERPL QL: NORMAL
BLOOD BANK CMNT PATIENT-IMP: NORMAL
CHOLEST SERPL-MCNC: 143 MG/DL
HDLC SERPL-MCNC: 53 MG/DL
LDLC SERPL CALC-MCNC: 76 MG/DL
NONHDLC SERPL-MCNC: 90 MG/DL
POTASSIUM SERPL-SCNC: 4.1 MMOL/L (ref 3.4–5.3)
SODIUM SERPL-SCNC: 134 MMOL/L (ref 133–144)
SPECIMEN EXP DATE BLD: NORMAL
TRIGL SERPL-MCNC: 71 MG/DL

## 2021-07-07 PROCEDURE — 86900 BLOOD TYPING SEROLOGIC ABO: CPT | Performed by: SURGERY

## 2021-07-07 PROCEDURE — 250N000009 HC RX 250: Performed by: NURSE ANESTHETIST, CERTIFIED REGISTERED

## 2021-07-07 PROCEDURE — 272N000001 HC OR GENERAL SUPPLY STERILE: Performed by: SURGERY

## 2021-07-07 PROCEDURE — 84295 ASSAY OF SERUM SODIUM: CPT | Performed by: ANESTHESIOLOGY

## 2021-07-07 PROCEDURE — 36415 COLL VENOUS BLD VENIPUNCTURE: CPT | Performed by: ANESTHESIOLOGY

## 2021-07-07 PROCEDURE — 99221 1ST HOSP IP/OBS SF/LOW 40: CPT | Performed by: INTERNAL MEDICINE

## 2021-07-07 PROCEDURE — 258N000003 HC RX IP 258 OP 636

## 2021-07-07 PROCEDURE — 86901 BLOOD TYPING SEROLOGIC RH(D): CPT | Performed by: SURGERY

## 2021-07-07 PROCEDURE — 250N000013 HC RX MED GY IP 250 OP 250 PS 637: Performed by: SURGERY

## 2021-07-07 PROCEDURE — 37799 UNLISTED PX VASCULAR SURGERY: CPT | Performed by: SURGERY

## 2021-07-07 PROCEDURE — 120N000001 HC R&B MED SURG/OB

## 2021-07-07 PROCEDURE — 258N000003 HC RX IP 258 OP 636: Performed by: SURGERY

## 2021-07-07 PROCEDURE — 250N000011 HC RX IP 250 OP 636: Performed by: NURSE ANESTHETIST, CERTIFIED REGISTERED

## 2021-07-07 PROCEDURE — 86850 RBC ANTIBODY SCREEN: CPT | Performed by: SURGERY

## 2021-07-07 PROCEDURE — 258N000003 HC RX IP 258 OP 636: Performed by: ANESTHESIOLOGY

## 2021-07-07 PROCEDURE — 84132 ASSAY OF SERUM POTASSIUM: CPT | Performed by: ANESTHESIOLOGY

## 2021-07-07 PROCEDURE — 258N000003 HC RX IP 258 OP 636: Performed by: NURSE ANESTHETIST, CERTIFIED REGISTERED

## 2021-07-07 PROCEDURE — 250N000011 HC RX IP 250 OP 636: Performed by: SURGERY

## 2021-07-07 PROCEDURE — 250N000011 HC RX IP 250 OP 636: Performed by: ANESTHESIOLOGY

## 2021-07-07 PROCEDURE — 250N000025 HC SEVOFLURANE, PER MIN: Performed by: SURGERY

## 2021-07-07 PROCEDURE — 250N000013 HC RX MED GY IP 250 OP 250 PS 637: Performed by: PHYSICIAN ASSISTANT

## 2021-07-07 PROCEDURE — 370N000017 HC ANESTHESIA TECHNICAL FEE, PER MIN: Performed by: SURGERY

## 2021-07-07 PROCEDURE — 041M09L BYPASS RIGHT POPLITEAL ARTERY TO POPLITEAL ARTERY WITH AUTOLOGOUS VENOUS TISSUE, OPEN APPROACH: ICD-10-PCS | Performed by: SURGERY

## 2021-07-07 PROCEDURE — 05BF0ZZ EXCISION OF LEFT CEPHALIC VEIN, OPEN APPROACH: ICD-10-PCS | Performed by: SURGERY

## 2021-07-07 PROCEDURE — 80061 LIPID PANEL: CPT | Performed by: ANESTHESIOLOGY

## 2021-07-07 PROCEDURE — 250N000011 HC RX IP 250 OP 636

## 2021-07-07 PROCEDURE — 360N000077 HC SURGERY LEVEL 4, PER MIN: Performed by: SURGERY

## 2021-07-07 PROCEDURE — 93005 ELECTROCARDIOGRAM TRACING: CPT

## 2021-07-07 PROCEDURE — 999N000141 HC STATISTIC PRE-PROCEDURE NURSING ASSESSMENT: Performed by: SURGERY

## 2021-07-07 PROCEDURE — 710N000009 HC RECOVERY PHASE 1, LEVEL 1, PER MIN: Performed by: SURGERY

## 2021-07-07 RX ORDER — CARVEDILOL 6.25 MG/1
6.25 TABLET ORAL 2 TIMES DAILY WITH MEALS
Status: DISCONTINUED | OUTPATIENT
Start: 2021-07-07 | End: 2021-07-09 | Stop reason: HOSPADM

## 2021-07-07 RX ORDER — NICOTINE 21 MG/24HR
1 PATCH, TRANSDERMAL 24 HOURS TRANSDERMAL DAILY
Status: DISCONTINUED | OUTPATIENT
Start: 2021-07-07 | End: 2021-07-09 | Stop reason: HOSPADM

## 2021-07-07 RX ORDER — FENTANYL CITRATE 50 UG/ML
25-50 INJECTION, SOLUTION INTRAMUSCULAR; INTRAVENOUS
Status: DISCONTINUED | OUTPATIENT
Start: 2021-07-07 | End: 2021-07-07 | Stop reason: HOSPADM

## 2021-07-07 RX ORDER — PROPOFOL 10 MG/ML
INJECTION, EMULSION INTRAVENOUS PRN
Status: DISCONTINUED | OUTPATIENT
Start: 2021-07-07 | End: 2021-07-07

## 2021-07-07 RX ORDER — METOCLOPRAMIDE 10 MG/1
10 TABLET ORAL EVERY 6 HOURS PRN
Status: DISCONTINUED | OUTPATIENT
Start: 2021-07-07 | End: 2021-07-07

## 2021-07-07 RX ORDER — OXYCODONE HYDROCHLORIDE 5 MG/1
10 TABLET ORAL EVERY 4 HOURS PRN
Status: DISCONTINUED | OUTPATIENT
Start: 2021-07-07 | End: 2021-07-09 | Stop reason: HOSPADM

## 2021-07-07 RX ORDER — METOCLOPRAMIDE HYDROCHLORIDE 5 MG/ML
10 INJECTION INTRAMUSCULAR; INTRAVENOUS EVERY 6 HOURS PRN
Status: DISCONTINUED | OUTPATIENT
Start: 2021-07-07 | End: 2021-07-07

## 2021-07-07 RX ORDER — SODIUM CHLORIDE, SODIUM LACTATE, POTASSIUM CHLORIDE, CALCIUM CHLORIDE 600; 310; 30; 20 MG/100ML; MG/100ML; MG/100ML; MG/100ML
INJECTION, SOLUTION INTRAVENOUS CONTINUOUS
Status: DISCONTINUED | OUTPATIENT
Start: 2021-07-07 | End: 2021-07-07 | Stop reason: HOSPADM

## 2021-07-07 RX ORDER — OXYCODONE HYDROCHLORIDE 5 MG/1
5 TABLET ORAL EVERY 4 HOURS PRN
Status: DISCONTINUED | OUTPATIENT
Start: 2021-07-07 | End: 2021-07-09 | Stop reason: HOSPADM

## 2021-07-07 RX ORDER — ONDANSETRON 4 MG/1
4 TABLET, ORALLY DISINTEGRATING ORAL EVERY 30 MIN PRN
Status: DISCONTINUED | OUTPATIENT
Start: 2021-07-07 | End: 2021-07-07 | Stop reason: HOSPADM

## 2021-07-07 RX ORDER — EPHEDRINE SULFATE 50 MG/ML
INJECTION, SOLUTION INTRAMUSCULAR; INTRAVENOUS; SUBCUTANEOUS PRN
Status: DISCONTINUED | OUTPATIENT
Start: 2021-07-07 | End: 2021-07-07

## 2021-07-07 RX ORDER — ASPIRIN 81 MG/1
81 TABLET, CHEWABLE ORAL DAILY
Status: DISCONTINUED | OUTPATIENT
Start: 2021-07-07 | End: 2021-07-09 | Stop reason: HOSPADM

## 2021-07-07 RX ORDER — HYDRALAZINE HYDROCHLORIDE 20 MG/ML
20 INJECTION INTRAMUSCULAR; INTRAVENOUS
Status: DISCONTINUED | OUTPATIENT
Start: 2021-07-07 | End: 2021-07-07 | Stop reason: HOSPADM

## 2021-07-07 RX ORDER — LISINOPRIL 20 MG/1
20 TABLET ORAL EVERY MORNING
Status: DISCONTINUED | OUTPATIENT
Start: 2021-07-08 | End: 2021-07-09 | Stop reason: HOSPADM

## 2021-07-07 RX ORDER — NALOXONE HYDROCHLORIDE 0.4 MG/ML
0.2 INJECTION, SOLUTION INTRAMUSCULAR; INTRAVENOUS; SUBCUTANEOUS
Status: ACTIVE | OUTPATIENT
Start: 2021-07-07 | End: 2021-07-08

## 2021-07-07 RX ORDER — OXYCODONE HCL 5 MG/5 ML
5 SOLUTION, ORAL ORAL EVERY 4 HOURS PRN
Status: DISCONTINUED | OUTPATIENT
Start: 2021-07-07 | End: 2021-07-09 | Stop reason: HOSPADM

## 2021-07-07 RX ORDER — CLINDAMYCIN PHOSPHATE 900 MG/50ML
900 INJECTION, SOLUTION INTRAVENOUS EVERY 8 HOURS
Status: COMPLETED | OUTPATIENT
Start: 2021-07-07 | End: 2021-07-08

## 2021-07-07 RX ORDER — NEOSTIGMINE METHYLSULFATE 1 MG/ML
VIAL (ML) INJECTION PRN
Status: DISCONTINUED | OUTPATIENT
Start: 2021-07-07 | End: 2021-07-07

## 2021-07-07 RX ORDER — HEPARIN SODIUM 5000 [USP'U]/.5ML
5000 INJECTION, SOLUTION INTRAVENOUS; SUBCUTANEOUS EVERY 8 HOURS
Status: DISCONTINUED | OUTPATIENT
Start: 2021-07-08 | End: 2021-07-09 | Stop reason: HOSPADM

## 2021-07-07 RX ORDER — LABETALOL HYDROCHLORIDE 5 MG/ML
INJECTION, SOLUTION INTRAVENOUS PRN
Status: DISCONTINUED | OUTPATIENT
Start: 2021-07-07 | End: 2021-07-07

## 2021-07-07 RX ORDER — ALBUTEROL SULFATE 0.83 MG/ML
2.5 SOLUTION RESPIRATORY (INHALATION) EVERY 4 HOURS PRN
Status: DISCONTINUED | OUTPATIENT
Start: 2021-07-07 | End: 2021-07-07 | Stop reason: HOSPADM

## 2021-07-07 RX ORDER — HYDROMORPHONE HCL IN WATER/PF 6 MG/30 ML
0.4 PATIENT CONTROLLED ANALGESIA SYRINGE INTRAVENOUS
Status: DISCONTINUED | OUTPATIENT
Start: 2021-07-07 | End: 2021-07-09 | Stop reason: HOSPADM

## 2021-07-07 RX ORDER — MEPERIDINE HYDROCHLORIDE 25 MG/ML
12.5 INJECTION INTRAMUSCULAR; INTRAVENOUS; SUBCUTANEOUS EVERY 5 MIN PRN
Status: DISCONTINUED | OUTPATIENT
Start: 2021-07-07 | End: 2021-07-07 | Stop reason: HOSPADM

## 2021-07-07 RX ORDER — DEXAMETHASONE SODIUM PHOSPHATE 4 MG/ML
INJECTION, SOLUTION INTRA-ARTICULAR; INTRALESIONAL; INTRAMUSCULAR; INTRAVENOUS; SOFT TISSUE PRN
Status: DISCONTINUED | OUTPATIENT
Start: 2021-07-07 | End: 2021-07-07

## 2021-07-07 RX ORDER — NALOXONE HYDROCHLORIDE 0.4 MG/ML
0.4 INJECTION, SOLUTION INTRAMUSCULAR; INTRAVENOUS; SUBCUTANEOUS
Status: ACTIVE | OUTPATIENT
Start: 2021-07-07 | End: 2021-07-08

## 2021-07-07 RX ORDER — ACETAMINOPHEN 325 MG/1
975 TABLET ORAL EVERY 8 HOURS
Status: DISCONTINUED | OUTPATIENT
Start: 2021-07-07 | End: 2021-07-09 | Stop reason: HOSPADM

## 2021-07-07 RX ORDER — NITROGLYCERIN 10 MG/100ML
INJECTION INTRAVENOUS PRN
Status: DISCONTINUED | OUTPATIENT
Start: 2021-07-07 | End: 2021-07-07

## 2021-07-07 RX ORDER — ONDANSETRON 2 MG/ML
4 INJECTION INTRAMUSCULAR; INTRAVENOUS EVERY 30 MIN PRN
Status: DISCONTINUED | OUTPATIENT
Start: 2021-07-07 | End: 2021-07-07 | Stop reason: HOSPADM

## 2021-07-07 RX ORDER — ONDANSETRON 2 MG/ML
4 INJECTION INTRAMUSCULAR; INTRAVENOUS EVERY 6 HOURS PRN
Status: DISCONTINUED | OUTPATIENT
Start: 2021-07-07 | End: 2021-07-09 | Stop reason: HOSPADM

## 2021-07-07 RX ORDER — PROCHLORPERAZINE MALEATE 10 MG
10 TABLET ORAL EVERY 6 HOURS PRN
Status: DISCONTINUED | OUTPATIENT
Start: 2021-07-07 | End: 2021-07-09 | Stop reason: HOSPADM

## 2021-07-07 RX ORDER — ONDANSETRON 2 MG/ML
INJECTION INTRAMUSCULAR; INTRAVENOUS PRN
Status: DISCONTINUED | OUTPATIENT
Start: 2021-07-07 | End: 2021-07-07

## 2021-07-07 RX ORDER — FENTANYL CITRATE 50 UG/ML
INJECTION, SOLUTION INTRAMUSCULAR; INTRAVENOUS PRN
Status: DISCONTINUED | OUTPATIENT
Start: 2021-07-07 | End: 2021-07-07

## 2021-07-07 RX ORDER — EZETIMIBE 10 MG/1
10 TABLET ORAL AT BEDTIME
Status: DISCONTINUED | OUTPATIENT
Start: 2021-07-07 | End: 2021-07-09 | Stop reason: HOSPADM

## 2021-07-07 RX ORDER — HEPARIN SODIUM 1000 [USP'U]/ML
INJECTION, SOLUTION INTRAVENOUS; SUBCUTANEOUS PRN
Status: DISCONTINUED | OUTPATIENT
Start: 2021-07-07 | End: 2021-07-07

## 2021-07-07 RX ORDER — SODIUM CHLORIDE 9 MG/ML
INJECTION, SOLUTION INTRAVENOUS CONTINUOUS
Status: DISCONTINUED | OUTPATIENT
Start: 2021-07-07 | End: 2021-07-08

## 2021-07-07 RX ORDER — LIDOCAINE HYDROCHLORIDE 20 MG/ML
INJECTION, SOLUTION INFILTRATION; PERINEURAL PRN
Status: DISCONTINUED | OUTPATIENT
Start: 2021-07-07 | End: 2021-07-07

## 2021-07-07 RX ORDER — HYDROMORPHONE HCL IN WATER/PF 6 MG/30 ML
0.2 PATIENT CONTROLLED ANALGESIA SYRINGE INTRAVENOUS
Status: DISCONTINUED | OUTPATIENT
Start: 2021-07-07 | End: 2021-07-09 | Stop reason: HOSPADM

## 2021-07-07 RX ORDER — PROTAMINE SULFATE 10 MG/ML
INJECTION, SOLUTION INTRAVENOUS PRN
Status: DISCONTINUED | OUTPATIENT
Start: 2021-07-07 | End: 2021-07-07

## 2021-07-07 RX ORDER — LIDOCAINE 40 MG/G
CREAM TOPICAL
Status: DISCONTINUED | OUTPATIENT
Start: 2021-07-07 | End: 2021-07-09 | Stop reason: HOSPADM

## 2021-07-07 RX ORDER — GLYCOPYRROLATE 0.2 MG/ML
INJECTION, SOLUTION INTRAMUSCULAR; INTRAVENOUS PRN
Status: DISCONTINUED | OUTPATIENT
Start: 2021-07-07 | End: 2021-07-07

## 2021-07-07 RX ORDER — CLINDAMYCIN PHOSPHATE 900 MG/50ML
900 INJECTION, SOLUTION INTRAVENOUS
Status: COMPLETED | OUTPATIENT
Start: 2021-07-07 | End: 2021-07-07

## 2021-07-07 RX ORDER — METOPROLOL TARTRATE 1 MG/ML
1-2 INJECTION, SOLUTION INTRAVENOUS EVERY 5 MIN PRN
Status: DISCONTINUED | OUTPATIENT
Start: 2021-07-07 | End: 2021-07-07 | Stop reason: HOSPADM

## 2021-07-07 RX ORDER — LORAZEPAM 2 MG/ML
.5-1 INJECTION INTRAMUSCULAR
Status: DISCONTINUED | OUTPATIENT
Start: 2021-07-07 | End: 2021-07-07 | Stop reason: HOSPADM

## 2021-07-07 RX ORDER — POLYETHYLENE GLYCOL 3350 17 G/17G
17 POWDER, FOR SOLUTION ORAL DAILY
Status: DISCONTINUED | OUTPATIENT
Start: 2021-07-08 | End: 2021-07-09 | Stop reason: HOSPADM

## 2021-07-07 RX ORDER — ACETAMINOPHEN 325 MG/1
650 TABLET ORAL EVERY 4 HOURS PRN
Status: DISCONTINUED | OUTPATIENT
Start: 2021-07-10 | End: 2021-07-09 | Stop reason: HOSPADM

## 2021-07-07 RX ORDER — SODIUM CHLORIDE 9 MG/ML
INJECTION, SOLUTION INTRAVENOUS CONTINUOUS PRN
Status: DISCONTINUED | OUTPATIENT
Start: 2021-07-07 | End: 2021-07-07

## 2021-07-07 RX ORDER — HYDROMORPHONE HYDROCHLORIDE 1 MG/ML
.3-.5 INJECTION, SOLUTION INTRAMUSCULAR; INTRAVENOUS; SUBCUTANEOUS EVERY 5 MIN PRN
Status: DISCONTINUED | OUTPATIENT
Start: 2021-07-07 | End: 2021-07-07 | Stop reason: HOSPADM

## 2021-07-07 RX ORDER — CLOPIDOGREL BISULFATE 75 MG/1
75 TABLET ORAL DAILY
Status: DISCONTINUED | OUTPATIENT
Start: 2021-07-08 | End: 2021-07-09 | Stop reason: HOSPADM

## 2021-07-07 RX ORDER — ONDANSETRON 4 MG/1
4 TABLET, ORALLY DISINTEGRATING ORAL EVERY 6 HOURS PRN
Status: DISCONTINUED | OUTPATIENT
Start: 2021-07-07 | End: 2021-07-09 | Stop reason: HOSPADM

## 2021-07-07 RX ORDER — HYDRALAZINE HYDROCHLORIDE 20 MG/ML
2.5-5 INJECTION INTRAMUSCULAR; INTRAVENOUS EVERY 10 MIN PRN
Status: DISCONTINUED | OUTPATIENT
Start: 2021-07-07 | End: 2021-07-07 | Stop reason: HOSPADM

## 2021-07-07 RX ORDER — ROSUVASTATIN CALCIUM 20 MG/1
40 TABLET, COATED ORAL AT BEDTIME
Status: DISCONTINUED | OUTPATIENT
Start: 2021-07-07 | End: 2021-07-09 | Stop reason: HOSPADM

## 2021-07-07 RX ADMIN — HEPARIN SODIUM 2000 UNITS: 1000 INJECTION INTRAVENOUS; SUBCUTANEOUS at 10:09

## 2021-07-07 RX ADMIN — ACETAMINOPHEN 975 MG: 325 TABLET, FILM COATED ORAL at 21:38

## 2021-07-07 RX ADMIN — DEXAMETHASONE SODIUM PHOSPHATE 4 MG: 4 INJECTION, SOLUTION INTRA-ARTICULAR; INTRALESIONAL; INTRAMUSCULAR; INTRAVENOUS; SOFT TISSUE at 08:05

## 2021-07-07 RX ADMIN — Medication 5 MG: at 08:17

## 2021-07-07 RX ADMIN — PROPOFOL 40 MG: 10 INJECTION, EMULSION INTRAVENOUS at 09:00

## 2021-07-07 RX ADMIN — HYDROMORPHONE HYDROCHLORIDE 0.5 MG: 1 INJECTION, SOLUTION INTRAMUSCULAR; INTRAVENOUS; SUBCUTANEOUS at 08:22

## 2021-07-07 RX ADMIN — Medication 5 MG: at 08:12

## 2021-07-07 RX ADMIN — PROPOFOL 30 MG: 10 INJECTION, EMULSION INTRAVENOUS at 10:53

## 2021-07-07 RX ADMIN — HYDROMORPHONE HYDROCHLORIDE 0.5 MG: 1 INJECTION, SOLUTION INTRAMUSCULAR; INTRAVENOUS; SUBCUTANEOUS at 11:36

## 2021-07-07 RX ADMIN — CLINDAMYCIN PHOSPHATE 900 MG: 900 INJECTION, SOLUTION INTRAVENOUS at 07:44

## 2021-07-07 RX ADMIN — GLYCOPYRROLATE 0.5 MG: 0.2 INJECTION, SOLUTION INTRAMUSCULAR; INTRAVENOUS at 11:14

## 2021-07-07 RX ADMIN — Medication 10 MG: at 07:57

## 2021-07-07 RX ADMIN — PROPOFOL 20 MG: 10 INJECTION, EMULSION INTRAVENOUS at 11:17

## 2021-07-07 RX ADMIN — SODIUM CHLORIDE, POTASSIUM CHLORIDE, SODIUM LACTATE AND CALCIUM CHLORIDE: 600; 310; 30; 20 INJECTION, SOLUTION INTRAVENOUS at 07:29

## 2021-07-07 RX ADMIN — OMEPRAZOLE 20 MG: 20 CAPSULE, DELAYED RELEASE ORAL at 13:46

## 2021-07-07 RX ADMIN — PROTAMINE SULFATE 5 MG: 10 INJECTION, SOLUTION INTRAVENOUS at 10:31

## 2021-07-07 RX ADMIN — DEXMEDETOMIDINE HYDROCHLORIDE 0.4 MCG/KG/HR: 100 INJECTION, SOLUTION INTRAVENOUS at 07:39

## 2021-07-07 RX ADMIN — MIDAZOLAM 1 MG: 1 INJECTION INTRAMUSCULAR; INTRAVENOUS at 07:34

## 2021-07-07 RX ADMIN — Medication 5 MG: at 09:11

## 2021-07-07 RX ADMIN — ONDANSETRON 4 MG: 2 INJECTION INTRAMUSCULAR; INTRAVENOUS at 10:42

## 2021-07-07 RX ADMIN — EZETIMIBE 10 MG: 10 TABLET ORAL at 21:38

## 2021-07-07 RX ADMIN — OXYCODONE HYDROCHLORIDE 10 MG: 5 TABLET ORAL at 23:26

## 2021-07-07 RX ADMIN — LIDOCAINE HYDROCHLORIDE 80 MG: 20 INJECTION, SOLUTION INFILTRATION; PERINEURAL at 07:43

## 2021-07-07 RX ADMIN — SODIUM CHLORIDE: 9 INJECTION, SOLUTION INTRAVENOUS at 09:52

## 2021-07-07 RX ADMIN — FENTANYL CITRATE 100 MCG: 50 INJECTION, SOLUTION INTRAMUSCULAR; INTRAVENOUS at 07:43

## 2021-07-07 RX ADMIN — Medication 5 MG: at 10:08

## 2021-07-07 RX ADMIN — LABETALOL HYDROCHLORIDE 10 MG: 5 INJECTION INTRAVENOUS at 11:27

## 2021-07-07 RX ADMIN — SODIUM CHLORIDE: 9 INJECTION, SOLUTION INTRAVENOUS at 13:45

## 2021-07-07 RX ADMIN — Medication 5 MG: at 08:46

## 2021-07-07 RX ADMIN — HEPARIN SODIUM 5000 UNITS: 1000 INJECTION INTRAVENOUS; SUBCUTANEOUS at 09:04

## 2021-07-07 RX ADMIN — ASPIRIN 81 MG CHEWABLE TABLET 81 MG: 81 TABLET CHEWABLE at 13:46

## 2021-07-07 RX ADMIN — NICOTINE 1 PATCH: 21 PATCH, EXTENDED RELEASE TRANSDERMAL at 16:33

## 2021-07-07 RX ADMIN — HYDRALAZINE HYDROCHLORIDE 5 MG: 20 INJECTION INTRAMUSCULAR; INTRAVENOUS at 11:39

## 2021-07-07 RX ADMIN — CLINDAMYCIN PHOSPHATE 900 MG: 900 INJECTION, SOLUTION INTRAVENOUS at 23:26

## 2021-07-07 RX ADMIN — ROSUVASTATIN CALCIUM 40 MG: 20 TABLET, FILM COATED ORAL at 21:38

## 2021-07-07 RX ADMIN — PROPOFOL 150 MG: 10 INJECTION, EMULSION INTRAVENOUS at 07:43

## 2021-07-07 RX ADMIN — ROCURONIUM BROMIDE 50 MG: 10 INJECTION INTRAVENOUS at 07:44

## 2021-07-07 RX ADMIN — LABETALOL HYDROCHLORIDE 5 MG: 5 INJECTION INTRAVENOUS at 11:35

## 2021-07-07 RX ADMIN — PROTAMINE SULFATE 25 MG: 10 INJECTION, SOLUTION INTRAVENOUS at 10:33

## 2021-07-07 RX ADMIN — ACETAMINOPHEN 975 MG: 325 TABLET, FILM COATED ORAL at 13:46

## 2021-07-07 RX ADMIN — PROPOFOL 40 MG: 10 INJECTION, EMULSION INTRAVENOUS at 09:51

## 2021-07-07 RX ADMIN — Medication 5 MG: at 09:37

## 2021-07-07 RX ADMIN — Medication 5 MG: at 08:52

## 2021-07-07 RX ADMIN — NITROGLYCERIN 20 MCG: 10 INJECTION INTRAVENOUS at 11:24

## 2021-07-07 RX ADMIN — CLINDAMYCIN PHOSPHATE 900 MG: 900 INJECTION, SOLUTION INTRAVENOUS at 16:34

## 2021-07-07 RX ADMIN — PROPOFOL 50 MG: 10 INJECTION, EMULSION INTRAVENOUS at 08:35

## 2021-07-07 RX ADMIN — HYDROMORPHONE HYDROCHLORIDE 0.2 MG: 0.2 INJECTION, SOLUTION INTRAMUSCULAR; INTRAVENOUS; SUBCUTANEOUS at 16:38

## 2021-07-07 RX ADMIN — PROPOFOL 40 MG: 10 INJECTION, EMULSION INTRAVENOUS at 09:53

## 2021-07-07 RX ADMIN — MIDAZOLAM 1 MG: 1 INJECTION INTRAMUSCULAR; INTRAVENOUS at 07:36

## 2021-07-07 RX ADMIN — HYDROMORPHONE HYDROCHLORIDE 0.2 MG: 0.2 INJECTION, SOLUTION INTRAMUSCULAR; INTRAVENOUS; SUBCUTANEOUS at 21:37

## 2021-07-07 RX ADMIN — PHENYLEPHRINE HYDROCHLORIDE 100 MCG: 10 INJECTION INTRAVENOUS at 07:49

## 2021-07-07 RX ADMIN — PHENYLEPHRINE HYDROCHLORIDE 100 MCG: 10 INJECTION INTRAVENOUS at 07:46

## 2021-07-07 RX ADMIN — HYDROMORPHONE HYDROCHLORIDE 0.5 MG: 1 INJECTION, SOLUTION INTRAMUSCULAR; INTRAVENOUS; SUBCUTANEOUS at 11:47

## 2021-07-07 RX ADMIN — NEOSTIGMINE METHYLSULFATE 2.5 MG: 1 INJECTION, SOLUTION INTRAVENOUS at 11:14

## 2021-07-07 RX ADMIN — PROPOFOL 30 MG: 10 INJECTION, EMULSION INTRAVENOUS at 10:55

## 2021-07-07 ASSESSMENT — LIFESTYLE VARIABLES: TOBACCO_USE: 1

## 2021-07-07 ASSESSMENT — COPD QUESTIONNAIRES
COPD: 1
CAT_SEVERITY: MILD

## 2021-07-07 ASSESSMENT — ACTIVITIES OF DAILY LIVING (ADL)
ADLS_ACUITY_SCORE: 12
ADLS_ACUITY_SCORE: 12

## 2021-07-07 ASSESSMENT — ENCOUNTER SYMPTOMS: DYSRHYTHMIAS: 1

## 2021-07-07 ASSESSMENT — MIFFLIN-ST. JEOR: SCORE: 986.12

## 2021-07-07 NOTE — OP NOTE
Preoperative diagnosis: Right lower extremity critical limb ischemia.    Postoperative diagnosis: Same.    Procedure:  1.  Margie of left upper extremity upper arm cephalic vein.  2.  Above-knee popliteal to below-knee popliteal artery bypass using left upper extremity translocated cephalic vein.    Surgeon: José Luis Hernandez M.D.  Assistant: Justice Beach M.D.  Anesthesia: General.  Complications: None.  Estimated blood loss: About 200 mL.    Indication for procedure: This is a 60-year-old female with generalized multiterritory atherosclerosis.  She has had a previous aortobifemoral bypass grafting and a right femoropopliteal bypass grafting with cadaveric social femoral artery.  She had developed high-grade stenosis of the right above-knee popliteal artery distal to the previous anastomosis.  Endovascular intervention was attempted and resulted in embolization and occlusion of the right posterior tibial artery.  Since that time patient has developed ischemic rest pain and short distance claudication.  Revascularization is advised for limb salvage.  He has no pain in the lower extremity that can be used.  We had marked the left upper extremity cephalic vein.    Procedure details: Patient was identified and then taken to the operating room and placed in supine position.  General anesthesia was induced.  Preoperative intravenous antibiotics were administered.  The right lower extremity and left upper extremity were prepped and a sterile surgical field was created.  Preprocedure timeout was conducted.    An above-knee incision was made on the medial side and the distal end of the femoropopliteal bypass graft was dissected out and prepared for clamping.  It had excellent pulsation.  Then we created a popliteal fossa tunnel going posterior to the femoral condyles.  Then a below-knee incision was made and the below-knee popliteal artery was dissected out and placed in Vesseloops.  It had no pulsation.  Then we turned our  attention to the left upper extremity cephalic vein.  Through 2 linear skip incisions starting from the shoulder to the elbow crease the cephalic vein was dissected out and placed in Vesseloops.  Side branches were ligated with 3-0 silk suture.  5000 units of heparin were given.  The vein was harvested.  The distal and proximal end of the vein were ligated with silk suture.  After 5 minutes of circulation time the above-knee femoropopliteal graft was clamped and an 8 mm elliptical arteriotomy was created.  The vein graft was spatulated and sutured to the artery in end-to-side fashion using running 6-0 Prolene suture.  We used 2 suture lines, 1 at the heel and 1 at the toe.  Prior to completion of the suture line the existing femoropopliteal graft was allowed to backbleed and forward bleed.  Suture line was completed.  There was excellent pulsation throughout the conduit.  The conduit was then tunneled from the donor graft to the below the knee popliteal artery.  We made sure the conduit was not kinked.  It had excellent pulsation after its tunneling.  The graft was then clamped immediately distal to the proximal anastomosis.  The below-knee popliteal artery was placed in Vesseloops which were tightened.  An 8 mm arteriotomy was created.  The vein graft was spatulated and sutured in an end-to-side fashion using running 7-0 Prolene suture.  2 suture lines started, 1 at the heel and 1 at the toe.  Additional 2000 and.  Try to completion of the suture line the popliteal artery was backbleed, there was good backbleeding, it was reclamped.  Then the conduit robust inflow, it was reclamped.  Flow was then established retrograde into the popliteal artery and then antegrade into the foot.  There was a strong biphasic anterior tibial artery signal at the level of the foot.  This was dependent on the graft.    There were 2 areas in the suture line and one in the conduit which required additional buttressing suture with 7-0  Prolene to achieve complete suture line hemostasis.  We had given a total of 7000 units of heparin.  30 mg of protamine were administered.  Adequate hemostasis was noted.  The lower extremity wounds were closed in layers with interrupted 2-0 Vicryl and skin was approximated with 3-0 chromic in vertical mattress fashion. The left upper extremity wounds were closed in single layer with vertical mattress 3-0 chromic followed by application of Aquacell dressing.    Counts of instruments, sponges and needles were noted to be correct.    Patient was awakened and extubated and taken to the recovery room in stable condition.    I called the patient's daughter and updated her on our progress.

## 2021-07-07 NOTE — ANESTHESIA PREPROCEDURE EVALUATION
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 Simons dermatology     Embolism and thrombosis of unspecified artery (H) 2000    Protein C,S, Leiden FV, Lupus Inhibitor Negative     Gastroesophageal reflux disease      Hyperlipidaemia      Hypertension      Lupus (H)     skin     Mild major depression (H) 2017     Myocardial infarction (H)     x3     Osteoarthrosis, unspecified whether generalized or localized, unspecified site      PAD (peripheral artery disease) (H)      Peripheral vascular disease, unspecified (H) 2000    s/p angioplasty with stent right femoral a.; Right iliac and femoral a. clot     Post-menopausal      Reflux esophagitis 2004    EGD: esophagitis and medium HH     SVT (supraventricular tachycardia) (H)      Thrombocytopenia (H)      Uncomplicated asthma      Vitamin C deficiency 2018      Past Surgical History:   Procedure Laterality Date     ANGIOGRAM       ANGIOGRAM Right 2021    Procedure: RIGHT LOWER EXTREMITY ANGIOGRAM WITH LEFT BRACHIAL ARTERY CUTDOWN;  Surgeon: José Luis Hernandez MD;  Location:  OR     BYPASS GRAFT FEMOROPOPLITEAL Right 2020    Procedure: RIGHT FEMORAL GRAFT TO ABOVE-KNEE POPLITEAL BYPASS USING CADAVERIC SUPERFICIAL FEMORAL ARTERY;  Surgeon: Chadwick Lozano MD;  Location:  OR     C FABRIC WRAPPING OF ABDOMINAL ANEURYSM       CARDIAC CATHERIZATION  2009     multivessel CAD without target lesions, med mgmt indicated, preserved ef     CARPAL TUNNEL RELEASE RT/LT Right 05/20/2021     ENDARTERECTOMY CAROTID Right 05/11/2016    Procedure: ENDARTERECTOMY CAROTID;  Surgeon: Chadwick Lozano MD;  Location:  OR     ENDARTERECTOMY CAROTID Left 06/08/2020    Procedure: LEFT CAROTID ENDARTERECTOMY with distal facal vein patch  and EEG;  Surgeon: Chadwick Lozano MD;  Location:  OR     GYN SURGERY  left tube    left salpingectomy     IR LOWER EXTREMITY ANGIOGRAM RIGHT  05/25/2021     IR OR ANGIOGRAM  6/23/2021     LAPAROSCOPIC CHOLECYSTECTOMY N/A 09/27/2017    Procedure: LAPAROSCOPIC CHOLECYSTECTOMY;  LAPAROSCOPIC CHOLECYSTECTOMY;  Surgeon: Jacoby Tapia MD;  Location:  [facility]     ORTHOPEDIC SURGERY      left knee surgery     VASCULAR SURGERY  aoto bi fem  left fem-AK pop     [facility] NONSPECIFIC PROCEDURE  12/2000    angioplasty with stent right fem. a.     [facility] NONSPECIFIC PROCEDURE  1987    sinus surgery     [facility] NONSPECIFIC PROCEDURE  09/02/2009    Emergent left groin exploration with oversewing of bleeding angiographic site. 2. Endarterectomy of common femoral-proximal superficial femoral artery with greater saphenous vein patch angioplasty.   a. Saragosa of accessory right greater saphenous vein.      [facility] NONSPECIFIC PROCEDURE  08/27/2009    occluded left common iliac and external iliac arteries were successfully revascularized with stenting to 8 and 7 mm       Allergies   Allergen Reactions     Contrast Dye Anaphylaxis     RASH, FACIAL AND NECK SWELLING, SOB, WHEEZING     Pantoprazole      Protonix caused diffuse edema     Chantix [Varenicline]      Terrible dreams     Penicillins Itching      Social History     Tobacco Use     Smoking status: Current Every Day Smoker     Packs/day: 1.00     Years: 52.00     Pack years: 52.00     Types: Cigarettes     Start date: 1960     Smokeless tobacco: Never Used     Tobacco comment: 1/2 PPD   Substance Use Topics      Alcohol use: Yes     Alcohol/week: 0.0 standard drinks     Comment: x1-2 yr      Wt Readings from Last 1 Encounters:   07/07/21 48.1 kg (106 lb)        Anesthesia Evaluation   Pt has had prior anesthetic.     No history of anesthetic complications       ROS/MED HX  ENT/Pulmonary:     (+) tobacco use, mild,  COPD,  (-) sleep apnea   Neurologic: Comment: CMT dz - neg neurologic ROS     Cardiovascular: Comment: PAD - s/p fem pop bypass 9/20. Now with symptomatic limb ischemia.     H/o SVT    S/p CEA    (+) Dyslipidemia hypertension-Peripheral Vascular Disease-- Symptomatic and Carotid Stenosis. CAD -past MI --Taking blood thinners dysrhythmias, Other, Previous cardiac testing   Echo: Date: Results:    Stress Test: Date: 4/16/21 Results:  Interpretation Summary     Suboptimal stress test due to inadequate maximum heart rate.  Normal left ventricular function and wall motion at rest and post-stress but  LV size and function were unchanged following limited exercise of 3 minutes.  Post-exercise images were obtained with the heart rate in the 70's bpm.  Blood pressure seth appropriately from 140/78 mmHg (supine, baseline) to  158/80 mmHg during Stage I and fell to 134/74 mmHg in recovery. Consider  Lexiscan stress imaging (nuclear, MRI) for adequate testing.  ECG Reviewed: Date: Results:    Cath: Date: Results:      METS/Exercise Tolerance:     Hematologic: Comments: Arterial clots    thrombocytopenia    (+) History of blood clots, pt is anticoagulated,     Musculoskeletal:       GI/Hepatic:     (+) GERD,     Renal/Genitourinary:  - neg Renal ROS     Endo:  - neg endo ROS     Psychiatric/Substance Use:       Infectious Disease:       Malignancy:       Other:               OUTSIDE LABS:  CBC:   Lab Results   Component Value Date    WBC 4.8 06/21/2021    WBC 7.8 05/25/2021    HGB 12.4 06/21/2021    HGB 11.9 05/25/2021    HCT 37.7 06/21/2021    HCT 35.4 05/25/2021     (L) 06/21/2021     (L) 05/25/2021      BMP:   Lab Results   Component Value Date     07/07/2021     (L) 06/23/2021    POTASSIUM 4.1 07/07/2021    POTASSIUM 4.3 06/23/2021    CHLORIDE 100 06/21/2021    CHLORIDE 104 01/05/2021    CO2 30 06/21/2021    CO2 27 01/05/2021    BUN 13 06/21/2021    BUN 11 01/05/2021    CR 0.65 06/21/2021    CR 0.58 01/05/2021    GLC 78 06/21/2021    GLC 89 01/05/2021     COAGS:   Lab Results   Component Value Date    PTT 25 04/21/2016    INR 1.01 09/24/2020     POC:   Lab Results   Component Value Date     (H) 06/24/2021     HEPATIC:   Lab Results   Component Value Date    ALBUMIN 3.6 01/05/2021    PROTTOTAL 7.2 01/05/2021    ALT 25 01/05/2021    AST 17 01/05/2021    ALKPHOS 84 01/05/2021    BILITOTAL 0.3 01/05/2021     OTHER:   Lab Results   Component Value Date    PH 7.42 03/19/2010    A1C 5.4 09/23/2020    SONIA 8.7 06/21/2021    PHOS 4.3 09/13/2018    MAG 1.8 09/13/2018    LIPASE 63 (L) 06/10/2020    AMYLASE 46 08/14/2017    TSH 0.77 01/05/2021    T4 1.19 03/28/2017    CRP <2.9 09/18/2014    SED 15 01/05/2021       Anesthesia Plan    ASA Status:  3   NPO Status:  NPO Appropriate    Anesthesia Type: General.     - Airway: ETT   Induction: Intravenous.   Maintenance: Balanced.   Techniques and Equipment:     - Lines/Monitors: 2nd IV     Consents    Anesthesia Plan(s) and associated risks, benefits, and realistic alternatives discussed. Questions answered and patient/representative(s) expressed understanding.     - Discussed with:  Patient         Postoperative Care    Pain management: IV analgesics, Oral pain medications.   PONV prophylaxis: Ondansetron (or other 5HT-3), Dexamethasone or Solumedrol     Comments:    Precedex gtt            Leigh Perdomo MD, MD

## 2021-07-07 NOTE — ANESTHESIA CARE TRANSFER NOTE
Patient: Shirley Hendricks    Procedure(s):  CREATION RIGHT FEMORAL TO POPLITEAL ARTERIAL BYPASS USING INSITU VEIN GRAFT    Diagnosis: Critical ischemia of extremity with history of revascularization of same extremity [I99.8, Z95.9]  Diagnosis Additional Information: No value filed.    Anesthesia Type:   General     Note:    Oropharynx: oropharynx clear of all foreign objects and spontaneously breathing  Level of Consciousness: awake  Oxygen Supplementation: face mask  Level of Supplemental Oxygen (L/min / FiO2): 6  Independent Airway: airway patency satisfactory and stable  Dentition: dentition unchanged  Vital Signs Stable: post-procedure vital signs reviewed and stable  Report to RN Given: handoff report given  Patient transferred to: PACU  Comments: Neuromuscular blockade reversed after TOF 4/4, spontaneous respirations, adequate tidal volumes, followed commands to voice, oropharynx suctioned with soft flexible catheter, extubated atraumatically, extubated with suction, airway patent after extubation.  Oxygen via facemask at 6 liters per minute to PACU. Oxygen tubing connected to wall O2 in PACU, SpO2, NiBP, and EKG monitors and alarms on and functioning, Heydi Hugger warmer connected to patient gown, report on patient's clinical status given to PACU RN, RN questions answered.     Handoff Report: Identifed the Patient, Identified the Reponsible Provider, Reviewed the pertinent medical history, Discussed the surgical course, Reviewed Intra-OP anesthesia mangement and issues during anesthesia, Set expectations for post-procedure period and Allowed opportunity for questions and acknowledgement of understanding      Vitals: (Last set prior to Anesthesia Care Transfer)  CRNA VITALS  7/7/2021 1058 - 7/7/2021 1139      7/7/2021             Pulse:  84    ART BP:  159/68    ART Mean:  100    SpO2:  100 %    Resp Rate (set):  10        Electronically Signed By: NOELLE Toscano CRNA  July 7, 2021  11:39 AM

## 2021-07-07 NOTE — CONSULTS
[facility]    Vascular Medicine Consultation     Date of Admission:  7/7/2021  Date of Consult (When I saw the patient): 07/07/21    Assessment & Plan   1. Critical limb ischemia with history of right aortobifemoral graft limb to above knee popliteal bypass using cadaveric SFA in 9/2020 now with reocclusion of native popliteal artery at the distal cadaveric conduit anastomosis s/p above-knee cadaveric bypass to below knee popliteal artery bypass with left upper extremity cephalic vein conduit 7/7/21    Post-operative cares per Dr. Hernandez / Vascular Surgery. She had been on Plavix and aspirin prior to admission. Resume these when able. She must quit smoking cmpletely.      2. Symptomatic high grade left carotid artery stenosis s/p left carotid endarterectomy 6/8/20 and prior left carotid endarterectomy in the remote past.     She has recovered well from this surgery. She does have some new moderate restenosis to 60% on the right post right carotid CEA in the past and 50-69% stenosis on the left as of 4/2021. She is currently asymptomatic. This will be followed as an outpatient by Dr. Lozano.      3. Hyperlipidemia     Her lipid panel this admission shows an LDL of 76 while on Rosuvastatin 40 mg daily. Given her severe PAD and carotid disease, it is recommended that she be treated to an LDL of less than 70. As such, we will add Zetia 10 mg daily to her Rosuvastatin.      4. Hypertension     Continue prior to admission carvedilol and lisinopril and monitor blood pressure closely.      5. Tobacco use     She quit smoking about 2 weeks ago and has been using 21 mg nicotine patches. These will be continued for her in the hospital. The importance of tobacco cessation was stressed.         Reason for Consult   Reason for consult: Asked by Dr. Hernandez / Dr. Lozano to evaluate vascular risk factors and assist with medical management in this 60 year old female smoker with a history of hyperlipidemia,  hypertension, cerebrovascular disease, CAD, and PAD now presenting for an above-knee cadaveric bypass to below knee popliteal artery bypass with left upper extremity cephalic vein conduit due to critical limb ischemia and failing bypass graft.      Primary Care Physician   Vasquez Benoit      History of Present Illness   Shirley Hendricks is a 60 year old female smoker and  normally followed by Dr. Narvaez who has a longstanding relationship with Dr. Chadwick Lozano of Vascular Surgery due to her prominent peripheral arterial disease and carotid disease.  She has previously had problems primarily with iliac occlusive disease and underwent angioplasty and stenting of her right common iliac artery in 2000.  Her right external iliac artery occluded in 03/2004, and she underwent recanalization stenting at that time. In 2010 she developed worsening claudication symptoms in her left leg and due to the very small size of her diseased superficial femoral artery, the patient was taken to the operating room for a left common femoral artery to above-knee popliteal bypass with right greater saphenous vein as conduit.        Unfortunately she continued to smoke. In 6/2016 she underwent a right carotid endarterectomy for symptomatic stenosis followed more recently by a left carotid endarterectomy on 6/8/20 for symptomatic left carotid artery stenosis. She has now recovered from this.      She continued to have worsening right lower extremity claudication. She had a known right SFA occlusion. On 9/23/20 she underwent a right aortobifemoral graft limb to above knee popliteal bypass using cadaveric SFA. On follow-up surveillance imaging she was noted to have occlusion of the native popliteal artery with some wall thrombus in the graft. Collaterals were keeping the graft patent. She did admit to some recurrent claudication symptoms in the right calf. An angiogram with angioplasty and suction thrombectomy was undertaken  on 5/25/21.     In early June she noticed increased pain in her right leg that wakes her up at night. The pain is relieved by dangling her legs and walking around. She does not have pain during the day however she does report a soreness that develops which is relieved by sitting or lying down. She checks the pulse in her graft daily at the right medial thigh and reports this has been consistent and normal. Arterial duplex on 6/14/21 confirmed reocclusion of native above-knee popliteal artery distal to the right lower extremity bypass and high velocity at the distal anastomosis of the bypass graft. She subsequently underwent a diagnostic angiogram on 6/23/21 and it was determined that she would benefit from undergoing an above-knee cadaveric bypass to below knee popliteal artery bypass with left upper extremity cephalic vein conduit. She presented for this today.          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 Simons dermatology     Embolism and thrombosis of unspecified artery (H) 08/2000    Protein C,S, Leiden FV, Lupus Inhibitor Negative     Gastroesophageal reflux disease      Hyperlipidaemia      Hypertension      Lupus (H)     skin     Mild major depression (H) 11/07/2017     Myocardial infarction (H)     x3     Osteoarthrosis, unspecified whether generalized or localized, unspecified site      PAD (peripheral artery disease) (H)      Peripheral vascular disease, unspecified (H) 12/2000    s/p angioplasty with stent right femoral a.; Right iliac and femoral a. clot     Post-menopausal      Reflux esophagitis 02/2004    EGD: esophagitis and medium HH     SVT (supraventricular tachycardia) (H)      Thrombocytopenia (H)      Uncomplicated asthma      Vitamin C  deficiency 08/12/2018       Past Surgical History   Past Surgical History:   Procedure Laterality Date     ANGIOGRAM       ANGIOGRAM Right 6/23/2021    Procedure: RIGHT LOWER EXTREMITY ANGIOGRAM WITH LEFT BRACHIAL ARTERY CUTDOWN;  Surgeon: José Luis Hernandez MD;  Location:  OR     BYPASS GRAFT FEMOROPOPLITEAL Right 09/23/2020    Procedure: RIGHT FEMORAL GRAFT TO ABOVE-KNEE POPLITEAL BYPASS USING CADAVERIC SUPERFICIAL FEMORAL ARTERY;  Surgeon: Chadwick Lozano MD;  Location:  OR     C FABRIC WRAPPING OF ABDOMINAL ANEURYSM       CARDIAC CATHERIZATION  09/03/2009    multivessel CAD without target lesions, med mgmt indicated, preserved ef     CARPAL TUNNEL RELEASE RT/LT Right 05/20/2021     ENDARTERECTOMY CAROTID Right 05/11/2016    Procedure: ENDARTERECTOMY CAROTID;  Surgeon: Chadwick Lozano MD;  Location:  OR     ENDARTERECTOMY CAROTID Left 06/08/2020    Procedure: LEFT CAROTID ENDARTERECTOMY with distal facal vein patch  and EEG;  Surgeon: Chadwick Lozano MD;  Location:  OR     GYN SURGERY  left tube    left salpingectomy     IR LOWER EXTREMITY ANGIOGRAM RIGHT  05/25/2021     IR OR ANGIOGRAM  6/23/2021     LAPAROSCOPIC CHOLECYSTECTOMY N/A 09/27/2017    Procedure: LAPAROSCOPIC CHOLECYSTECTOMY;  LAPAROSCOPIC CHOLECYSTECTOMY;  Surgeon: Jacoby Tapia MD;  Location:  [facility]     ORTHOPEDIC SURGERY      left knee surgery     VASCULAR SURGERY  aoto bi fem  left fem-AK pop     [facility] NONSPECIFIC PROCEDURE  12/2000    angioplasty with stent right fem. a.     [facility] NONSPECIFIC PROCEDURE  1987    sinus surgery     [facility] NONSPECIFIC PROCEDURE  09/02/2009    Emergent left groin exploration with oversewing of bleeding angiographic site. 2. Endarterectomy of common femoral-proximal superficial femoral artery with greater saphenous vein patch angioplasty.   a. [place] of accessory right greater saphenous vein.      [facility] NONSPECIFIC PROCEDURE  08/27/2009    occluded left common iliac and external  iliac arteries were successfully revascularized with stenting to 8 and 7 mm        Prior to Admission Medications   Prior to Admission Medications   Prescriptions Last Dose Informant Patient Reported? Taking?   Calcium Carbonate-Vitamin D3 (CALCIUM 600-D) 600-400 MG-UNIT TABS 7/6/2021 at PM Self Yes Yes   Sig: Take 1 tablet by mouth every evening   acetaminophen (TYLENOL) 500 MG tablet 7/6/2021 at PRN Self Yes Yes   Sig: Take 500-1,000 mg by mouth every 6 hours as needed for mild pain   aspirin (ASA) 81 MG chewable tablet MORE THAN THREE WEEKS Self No Yes   Sig: Take 1 tablet (81 mg) by mouth daily   carvedilol (COREG) 6.25 MG tablet 7/6/2021 at AM Self Yes Yes   Sig: Take 6.25 mg by mouth 2 times daily (with meals)   clopidogrel (PLAVIX) 75 MG tablet 7/7/2021 at AM Self No Yes   Sig: Take 1 tablet (75 mg) by mouth daily   denosumab (PROLIA) 60 MG/ML SOLN injection More than a month at Unknown time Self No No   Sig: Inject 1 mL (60 mg) Subcutaneous every 6 months INDICATION: TO TREAT OSTEOPOROSIS   diphenhydrAMINE (BENADRYL) 25 MG tablet Past Month at PRN Self Yes Yes   Sig: Take 25-50 mg by mouth once as needed for itching or allergies (30 minutes to 2 hours before procedure using contrast)   fluticasone-vilanterol (BREO ELLIPTA) 200-25 MCG/INH inhaler 7/7/2021 at AM Self Yes Yes   Sig: Inhale 1 puff into the lungs daily   lisinopril (ZESTRIL) 20 MG tablet 7/5/2021 at AM Self Yes Yes   Sig: Take 20 mg by mouth every morning   methylPREDNISolone (MEDROL) 16 MG tablet  at PRN Self No Yes   Sig: Take 32 mg by mouth 12 hours before the procedure and repeat 32 mg by mouth 2 hours before the procedure to prevent contrast reaction.   nicotine (NICODERM CQ) 14 MG/24HR 24 hr patch 7/5/2021 Self No Yes   Sig: Place 1 patch onto the skin every 24 hours   nicotine (NICODERM CQ) 21 MG/24HR 24 hr patch 7/6/2021 at AM Self No Yes   Sig: Place 1 patch onto the skin every 24 hours   nicotine (NICODERM CQ) 7 MG/24HR 24 hr patch   Self No Yes   Sig: Place 1 patch onto the skin every 24 hours Start after completing 14 mg patches.   nitroGLYcerin (NITROSTAT) 0.4 MG sublingual tablet  Self No Yes   Sig: Place 1 tablet (0.4 mg) under the tongue See Admin Instructions for chest pain   omeprazole (PRILOSEC) 20 MG DR capsule 7/6/2021 at AM Self Yes Yes   Sig: Take 20 mg by mouth every morning   rosuvastatin (CRESTOR) 40 MG tablet 7/6/2021 at PM Self No Yes   Sig: Take 1 tablet (40 mg) by mouth At Bedtime      Facility-Administered Medications: None     Allergies   Allergies   Allergen Reactions     Contrast Dye Anaphylaxis     RASH, FACIAL AND NECK SWELLING, SOB, WHEEZING     Pantoprazole      Protonix caused diffuse edema     Chantix [Varenicline]      Terrible dreams     Penicillins Itching       Social History   Shirley Hendricks  reports that she has been smoking cigarettes. She started smoking about 60 years ago. She has a 52.00 pack-year smoking history. She has never used smokeless tobacco. She reports current alcohol use. She reports that she does not use drugs. She works as a .     Family History   Family History   Problem Relation Age of Onset     Cancer Mother         bladder     Cardiovascular Father         alive,multiple heart attacks     Diabetes Maternal Grandmother      Lung Cancer Maternal Grandmother      Blood Disease Brother         clotting disorder       Review of Systems   The 10 point Review of Systems is negative other than noted in the HPI or here.     Physical Exam   Temp: 97.6  F (36.4  C) Temp src: Temporal BP: 128/69 Pulse: 57   Resp: 16 SpO2: 97 % O2 Device: None (Room air)    Vital Signs with Ranges  Temp:  [97.6  F (36.4  C)] 97.6  F (36.4  C)  Pulse:  [57] 57  Resp:  [16] 16  BP: (128)/(69) 128/69  SpO2:  [97 %] 97 %  106 lbs 0 oz    Constitutional: awake, alert, cooperative, no apparent distress, and appears stated age  Eyes: Lids and lashes normal, pupils equal, round and reactive to light,  extra ocular muscles intact, sclera clear, conjunctiva normal  ENT: normocepalic, without obvious abnormality, oropharynx pink and moist  Hematologic / Lymphatic: no lymphadenopathy  Respiratory: No increased work of breathing, good air exchange, clear to auscultation bilaterally, no crackles or wheezing  Cardiovascular: regular rate and rhythm, normal S1 and S2 and no murmur noted  GI: Normal bowel sounds, soft, non-distended, non-tender  Skin: no redness, warmth, or swelling, no rashes. Surgical sites are c/d/i.   Musculoskeletal: There is no redness, warmth, or swelling of the joints.  Full range of motion noted.  Motor strength is 5 out of 5 all extremities bilaterally.  Tone is normal. Right leg is warm.   Neurologic: Awake, alert, oriented to name, place and time.  Cranial nerves II-XII are grossly intact.  Motor is 5 out of 5 bilaterally.    Neuropsychiatric:  Normal affect, memory, insight.    Data   Most Recent 3 CBC's:  Recent Labs   Lab Test 06/21/21  1411 05/25/21  1731 05/13/21  1832   WBC 4.8 7.8 5.7   HGB 12.4 11.9 11.9   MCV 97 92 95   * 122* 110*     Most Recent 3 BMP's:  Recent Labs   Lab Test 07/07/21  0624 06/23/21  1134 06/21/21  1411 01/05/21  1119 09/25/20  0807    132* 130* 134 139   POTASSIUM 4.1 4.3 4.6 4.4 3.6   CHLORIDE  --   --  100 104 107   CO2  --   --  30 27 28   BUN  --   --  13 11 8   CR  --   --  0.65 0.58 0.53   ANIONGAP  --   --  <1* 3 4   SONIA  --   --  8.7 9.0 8.9   GLC  --   --  78 89 90     Most Recent 3 INR's:  Recent Labs   Lab Test 09/24/20  0739 05/10/16  1208 04/21/16  1400   INR 1.01 0.95 1.02     Most Recent Cholesterol Panel:  Recent Labs   Lab Test 07/07/21  0624   CHOL 143   LDL 76   HDL 53   TRIG 71     Most Recent Hemoglobin A1c:  Recent Labs   Lab Test 09/23/20  0844   A1C 5.4

## 2021-07-07 NOTE — ANESTHESIA PROCEDURE NOTES
Airway       Patient location during procedure: OR  Staff -        Anesthesiologist:  Leigh Perdomo MD       CRNA: Jacoby Salazar APRN CRNA       Other Anesthesia Staff: Kj Garsia RN       Performed By: SRNA and with CRNAs       Procedure performed by resident/fellow/CRNA in presence of a teaching physician.    Consent for Airway        Urgency: elective  Indications and Patient Condition       Indications for airway management: lydia-procedural       Induction type:intravenous       Mask difficulty assessment: 1 - vent by mask    Final Airway Details       Final airway type: endotracheal airway       Successful airway: ETT - single  Endotracheal Airway Details        ETT size (mm): 7.0       Cuffed: yes       Successful intubation technique: direct laryngoscopy       DL Blade Type: Ramirez 2       Grade View of Cords: 1       Adjucts: stylet       Position: Right       Measured from: gums/teeth       Secured at (cm): 21       Bite block used: None    Post intubation assessment        Placement verified by: capnometry, equal breath sounds and chest rise        Number of attempts at approach: 1       Number of other approaches attempted: 0       Secured with: pink tape       Ease of procedure: easy       Dentition: Intact and Unchanged    Medication(s) Administered   Medication Administration Time: 7/7/2021 7:47 AM

## 2021-07-07 NOTE — ANESTHESIA POSTPROCEDURE EVALUATION
Patient: Shirley Hendricks    Procedure(s):  CREATION RIGHT FEMORAL TO POPLITEAL ARTERIAL BYPASS USING INSITU VEIN GRAFT    Diagnosis:Critical ischemia of extremity with history of revascularization of same extremity [I99.8, Z95.9]  Diagnosis Additional Information: No value filed.    Anesthesia Type:  General    Note:  Disposition: Inpatient   Postop Pain Control: Uneventful            Sign Out: Well controlled pain   PONV: No   Neuro/Psych: Uneventful            Sign Out: Acceptable/Baseline neuro status   Airway/Respiratory: Uneventful            Sign Out: Acceptable/Baseline resp. status   CV/Hemodynamics: Uneventful            Sign Out: Acceptable CV status; No obvious hypovolemia; No obvious fluid overload   Other NRE: NONE   DID A NON-ROUTINE EVENT OCCUR? No           Last vitals:  Vitals:    07/07/21 1220 07/07/21 1247 07/07/21 1330   BP: 118/62 110/62 101/53   Pulse: 51 51    Resp: 12 12 10   Temp:  36.2  C (97.2  F)    SpO2: 99% 98% 96%       Last vitals prior to Anesthesia Care Transfer:  CRNA VITALS  7/7/2021 1058 - 7/7/2021 1158      7/7/2021             Pulse:  84    ART BP:  159/68    ART Mean:  100    SpO2:  100 %    Resp Rate (set):  10          Electronically Signed By: Leigh Perdomo MD, MD  July 7, 2021  2:41 PM

## 2021-07-07 NOTE — PLAN OF CARE
Arrived on floor from PACU at 1250. VSS except for sinus christel HR in the mid to low 50's. On room air LS dim. CAPNO in place. Sleepy between cares. Left arm dressing covered with Aquacel, small few marked spots of drainage. Right thigh and lower leg incisions with scant dried drainage. Reports bilateral upper and lower extremity numbness and tingling which is baseline. Radial pulses palpable. BLE found with doppler for PT pulses. Pain managed with tylenol and dilaudid. Alvarez in place with adequate output. Regular diet fair appetite. Patient refused to dangle or get out of bed tonight PT to see tomorrow.

## 2021-07-08 ENCOUNTER — APPOINTMENT (OUTPATIENT)
Dept: PHYSICAL THERAPY | Facility: CLINIC | Age: 61
DRG: 253 | End: 2021-07-08
Attending: SURGERY
Payer: COMMERCIAL

## 2021-07-08 LAB
ANION GAP SERPL CALCULATED.3IONS-SCNC: 6 MMOL/L (ref 3–14)
BASOPHILS # BLD AUTO: 0.1 10E9/L (ref 0–0.2)
BASOPHILS NFR BLD AUTO: 0.8 %
BUN SERPL-MCNC: 13 MG/DL (ref 7–30)
CALCIUM SERPL-MCNC: 8.3 MG/DL (ref 8.5–10.1)
CHLORIDE SERPL-SCNC: 101 MMOL/L (ref 94–109)
CO2 SERPL-SCNC: 21 MMOL/L (ref 20–32)
CREAT SERPL-MCNC: 0.62 MG/DL (ref 0.52–1.04)
DIFFERENTIAL METHOD BLD: ABNORMAL
EOSINOPHIL # BLD AUTO: 0.1 10E9/L (ref 0–0.7)
EOSINOPHIL NFR BLD AUTO: 1.5 %
ERYTHROCYTE [DISTWIDTH] IN BLOOD BY AUTOMATED COUNT: 12.4 % (ref 10–15)
GFR SERPL CREATININE-BSD FRML MDRD: >90 ML/MIN/{1.73_M2}
GLUCOSE BLDC GLUCOMTR-MCNC: 87 MG/DL (ref 70–99)
GLUCOSE SERPL-MCNC: 86 MG/DL (ref 70–99)
HCT VFR BLD AUTO: 26.1 % (ref 35–47)
HGB BLD-MCNC: 8.8 G/DL (ref 11.7–15.7)
IMM GRANULOCYTES # BLD: 0 10E9/L (ref 0–0.4)
IMM GRANULOCYTES NFR BLD: 0.3 %
LYMPHOCYTES # BLD AUTO: 0.9 10E9/L (ref 0.8–5.3)
LYMPHOCYTES NFR BLD AUTO: 12.9 %
MAGNESIUM SERPL-MCNC: 1.7 MG/DL (ref 1.6–2.3)
MCH RBC QN AUTO: 31.4 PG (ref 26.5–33)
MCHC RBC AUTO-ENTMCNC: 33.7 G/DL (ref 31.5–36.5)
MCV RBC AUTO: 93 FL (ref 78–100)
MONOCYTES # BLD AUTO: 0.5 10E9/L (ref 0–1.3)
MONOCYTES NFR BLD AUTO: 6.9 %
NEUTROPHILS # BLD AUTO: 5.6 10E9/L (ref 1.6–8.3)
NEUTROPHILS NFR BLD AUTO: 77.6 %
NRBC # BLD AUTO: 0 10*3/UL
NRBC BLD AUTO-RTO: 0 /100
PHOSPHATE SERPL-MCNC: 3.5 MG/DL (ref 2.5–4.5)
PLATELET # BLD AUTO: 158 10E9/L (ref 150–450)
POTASSIUM SERPL-SCNC: 4.1 MMOL/L (ref 3.4–5.3)
RBC # BLD AUTO: 2.8 10E12/L (ref 3.8–5.2)
SODIUM SERPL-SCNC: 128 MMOL/L (ref 133–144)
WBC # BLD AUTO: 7.3 10E9/L (ref 4–11)

## 2021-07-08 PROCEDURE — 83735 ASSAY OF MAGNESIUM: CPT | Performed by: SURGERY

## 2021-07-08 PROCEDURE — 82947 ASSAY GLUCOSE BLOOD QUANT: CPT | Performed by: SURGERY

## 2021-07-08 PROCEDURE — 97162 PT EVAL MOD COMPLEX 30 MIN: CPT | Mod: GP

## 2021-07-08 PROCEDURE — 97116 GAIT TRAINING THERAPY: CPT | Mod: GP

## 2021-07-08 PROCEDURE — 250N000013 HC RX MED GY IP 250 OP 250 PS 637: Performed by: PHYSICIAN ASSISTANT

## 2021-07-08 PROCEDURE — 120N000001 HC R&B MED SURG/OB

## 2021-07-08 PROCEDURE — 84100 ASSAY OF PHOSPHORUS: CPT | Performed by: SURGERY

## 2021-07-08 PROCEDURE — 250N000013 HC RX MED GY IP 250 OP 250 PS 637: Performed by: SURGERY

## 2021-07-08 PROCEDURE — 250N000011 HC RX IP 250 OP 636: Performed by: SURGERY

## 2021-07-08 PROCEDURE — 85025 COMPLETE CBC W/AUTO DIFF WBC: CPT | Performed by: SURGERY

## 2021-07-08 PROCEDURE — 999N001017 HC STATISTIC GLUCOSE BY METER IP

## 2021-07-08 PROCEDURE — 258N000003 HC RX IP 258 OP 636: Performed by: SURGERY

## 2021-07-08 PROCEDURE — 99233 SBSQ HOSP IP/OBS HIGH 50: CPT | Performed by: INTERNAL MEDICINE

## 2021-07-08 PROCEDURE — 36415 COLL VENOUS BLD VENIPUNCTURE: CPT | Performed by: SURGERY

## 2021-07-08 PROCEDURE — 80048 BASIC METABOLIC PNL TOTAL CA: CPT | Performed by: SURGERY

## 2021-07-08 RX ADMIN — HEPARIN SODIUM 5000 UNITS: 5000 INJECTION, SOLUTION INTRAVENOUS; SUBCUTANEOUS at 05:39

## 2021-07-08 RX ADMIN — ACETAMINOPHEN 975 MG: 325 TABLET, FILM COATED ORAL at 21:09

## 2021-07-08 RX ADMIN — EZETIMIBE 10 MG: 10 TABLET ORAL at 21:09

## 2021-07-08 RX ADMIN — OXYCODONE HYDROCHLORIDE 10 MG: 5 TABLET ORAL at 15:47

## 2021-07-08 RX ADMIN — OXYCODONE HYDROCHLORIDE 5 MG: 5 TABLET ORAL at 08:50

## 2021-07-08 RX ADMIN — ROSUVASTATIN CALCIUM 40 MG: 20 TABLET, FILM COATED ORAL at 21:09

## 2021-07-08 RX ADMIN — NICOTINE 1 PATCH: 21 PATCH, EXTENDED RELEASE TRANSDERMAL at 08:51

## 2021-07-08 RX ADMIN — CLINDAMYCIN PHOSPHATE 900 MG: 900 INJECTION, SOLUTION INTRAVENOUS at 09:31

## 2021-07-08 RX ADMIN — OXYCODONE HYDROCHLORIDE 10 MG: 5 TABLET ORAL at 03:39

## 2021-07-08 RX ADMIN — HEPARIN SODIUM 5000 UNITS: 5000 INJECTION, SOLUTION INTRAVENOUS; SUBCUTANEOUS at 13:59

## 2021-07-08 RX ADMIN — LISINOPRIL 20 MG: 20 TABLET ORAL at 08:49

## 2021-07-08 RX ADMIN — HEPARIN SODIUM 5000 UNITS: 5000 INJECTION, SOLUTION INTRAVENOUS; SUBCUTANEOUS at 21:09

## 2021-07-08 RX ADMIN — OXYCODONE HYDROCHLORIDE 10 MG: 5 TABLET ORAL at 21:17

## 2021-07-08 RX ADMIN — ACETAMINOPHEN 975 MG: 325 TABLET, FILM COATED ORAL at 05:39

## 2021-07-08 RX ADMIN — ASPIRIN 81 MG CHEWABLE TABLET 81 MG: 81 TABLET CHEWABLE at 08:49

## 2021-07-08 RX ADMIN — OMEPRAZOLE 20 MG: 20 CAPSULE, DELAYED RELEASE ORAL at 08:49

## 2021-07-08 RX ADMIN — SODIUM CHLORIDE: 9 INJECTION, SOLUTION INTRAVENOUS at 03:46

## 2021-07-08 RX ADMIN — ACETAMINOPHEN 975 MG: 325 TABLET, FILM COATED ORAL at 13:59

## 2021-07-08 RX ADMIN — CLOPIDOGREL BISULFATE 75 MG: 75 TABLET ORAL at 08:49

## 2021-07-08 RX ADMIN — CARVEDILOL 6.25 MG: 6.25 TABLET, FILM COATED ORAL at 08:49

## 2021-07-08 RX ADMIN — OXYCODONE HYDROCHLORIDE 5 MG: 5 TABLET ORAL at 09:53

## 2021-07-08 ASSESSMENT — ACTIVITIES OF DAILY LIVING (ADL)
ADLS_ACUITY_SCORE: 12
ADLS_ACUITY_SCORE: 12
ADLS_ACUITY_SCORE: 14
ADLS_ACUITY_SCORE: 12
ADLS_ACUITY_SCORE: 14
ADLS_ACUITY_SCORE: 14

## 2021-07-08 NOTE — PROGRESS NOTES
07/08/21 0920   Quick Adds   Type of Visit Initial PT Evaluation   Living Environment   People in home spouse   Current Living Arrangements house   Home Accessibility stairs to enter home;stairs within home   Number of Stairs, Main Entrance 3   Stair Railings, Main Entrance railing on right side (ascending)   Number of Stairs, Within Home, Primary other (see comments)  (flight to basement for laundry)   Transportation Anticipated family or friend will provide   Living Environment Comments Pt reports once in the home, all needs on 1 level, family can assist with laundry that is in the basement.    Self-Care   Usual Activity Tolerance good   Current Activity Tolerance fair   Equipment Currently Used at Home cane, straight  (per chart.)   Activity/Exercise/Self-Care Comment per chart, pt is a .    Disability/Function   Hearing Difficulty or Deaf no   Wear Glasses or Blind no   Concentrating, Remembering or Making Decisions Difficulty no   Difficulty Communicating no   Difficulty Eating/Swallowing no   Walking or Climbing Stairs Difficulty no   Dressing/Bathing Difficulty no   Toileting issues no   Doing Errands Independently Difficulty (such as shopping) no   Fall history within last six months yes   Number of times patient has fallen within last six months 2  (per chart)   Change in Functional Status Since Onset of Current Illness/Injury yes   General Information   Onset of Illness/Injury or Date of Surgery 07/07/21   Referring Physician Justice Beach MD   Patient/Family Therapy Goals Statement (PT) none stated   Pertinent History of Current Problem (include personal factors and/or comorbidities that impact the POC) 61 y/o F with Critical limb ischemia with history of right aortobifemoral graft limb to above knee popliteal bypass using cadaveric SFA in 9/2020 now with reocclusion of native popliteal artery at the distal cadaveric conduit anastomosis s/p above-knee cadaveric bypass to below knee  popliteal artery bypass with left upper extremity cephalic vein conduit 7/7/21. PMHx: symptomatic high grade left carotid artery stenosis s/p left carotid endarterectomy 6/8/20 and prior left carotid endarterectomy in the remote past, Tobacco use, charcot-tawana-tooth disease type 1A, COPD.    Existing Precautions/Restrictions fall   General Observations sitting on toilet at initial encounter,  NAD   Cognition   Orientation Status (Cognition) oriented to;person;place;situation   Affect/Mental Status (Cognition) WNL   Follows Commands (Cognition) WNL   Cognitive Status Comments cooperative.    Pain Assessment   Patient Currently in Pain Yes, see Vital Sign flowsheet  (right LE )   Integumentary/Edema   Integumentary/Edema Comments dressing over right LE and right UE from bypass surgery. noted increased bleeding outside of right LE dressing post ambulation-RN notified.    Range of Motion (ROM)   ROM Comment WFL   Strength   Strength Comments pt able to move right LE against gravity-able to perform SLR INDly   Bed Mobility   Comment (Bed Mobility) SBA with increased time sit to supine    Transfers   Transfer Safety Comments mod A with sit to stand from toilet with FWW    Gait/Stairs (Locomotion)   [place] Level (Gait) minimum assist (75% patient effort)   Assistive Device (Gait) walker, front-wheeled   Distance in Feet (Required for LE Total Joints) 5   Pattern (Gait) step-to   Deviations/Abnormal Patterns (Gait) antalgic   Balance   Balance Comments IND sitting balance, SBA with static standing balance with UE support on FWW   Sensory Examination   Sensory Perception Comments impaired sensation to light touch and deep pressure bilateral feet-pt reports recently diagnosed with Charcot-tawana-tooth disease.    Clinical Impression   Criteria for Skilled Therapeutic Intervention yes, treatment indicated   PT Diagnosis (PT) impaired gait   Influenced by the following impairments right LE pain, decreased activity  tolerance   Functional limitations due to impairments POD #1 right LE bypass   Clinical Presentation Evolving/Changing   Clinical Presentation Rationale clinical judgement, pain   Clinical Decision Making (Complexity) moderate complexity   Therapy Frequency (PT) Daily   Predicted Duration of Therapy Intervention (days/wks) 5 days   Planned Therapy Interventions (PT) balance training;bed mobility training;gait training;home exercise program;patient/family education;stair training;strengthening;transfer training   Anticipated Equipment Needs at Discharge (PT) walker, rolling   Risk & Benefits of therapy have been explained evaluation/treatment results reviewed;care plan/treatment goals reviewed;risks/benefits reviewed;current/potential barriers reviewed;participants voiced agreement with care plan;participants included;patient   PT Discharge Planning    PT Discharge Recommendation (DC Rec) home with assist   PT Rationale for DC Rec Anticipate pt will be able to return home with assist of family as needed. Pt with increased pain with ambulation due to recent right LE bypass and may benefit from a FWW at d/c.    PT Brief overview of current status  saturation and active bleeding of right LE surgery site post ambulation-RN alerted and reinforced dressing.    Total Evaluation Time   Total Evaluation Time (Minutes) 8

## 2021-07-08 NOTE — PLAN OF CARE
2300h-0700h: AOx4. /65 mmHg, denies chest pain. O2Sat 98% on RA. Diminished breath sound on RLL. Regular diet, (-) nv. Alvarez F16 in place, draining yellow urine, hypoactive BS x4. Refused to dangle & ambulate at 2300h. BETHANY dressing marked-unchange & RLE dressing marked-unchange. Doppler pulses on both feet, L foot stronger pulses than R. Baseline numbness & tingling on extremities. NS 75 ml/hr infusing on R forearm. Pain of 8/10 on surgical site, managed w/ PRN oxycodone. Refused PCD, encouraged dorsi flexion. Explained importance of early ambulation.   0440h: dangled & stood at the bedside, still can't tolerate pain to ambulate.  0500h: Kim discontinued POD1 as per MD's order.

## 2021-07-08 NOTE — PROGRESS NOTES
"VASCULAR SURGERY PROGRESS NOTE    Subjective:  Doing well. Resting comfortably in bed. Complaining of some incisional pain.     Objective:    Intake/Output Summary (Last 24 hours) at 7/8/2021 0834  Last data filed at 7/8/2021 0804  Gross per 24 hour   Intake 1675 ml   Output 1175 ml   Net 500 ml     PHYSICAL EXAM:  BP (!) 141/70 (BP Location: Right arm)   Pulse 60   Temp 98.4  F (36.9  C) (Oral)   Resp 16   Ht 1.562 m (5' 1.5\")   Wt 48.1 kg (106 lb)   SpO2 93%   BMI 19.70 kg/m    Well-appearing; no acute distress  Unlabored on room air    Palpable R DP pulse; Dressing with some strikethrough bleeding.       ASSESSMENT:  S/p R above knee popliteal to below knee popliteal artery bypass using left upper extremity reversed cephalic vein for right lower extremity critical limb ischemia (7/7)      PLAN:  - PRN pain medications  - Aspirin, plavix, rosvustatinZetia  - Regular diet; Saline lock IV  - Physical therapy consultation   - Heparin 5000 subQ  - Dispo: Floor     Discussed pt history, exam, assessment and plan with Dr. Hernandez of the vascular surgery service, who is in agreement with the above.    Justice Beach MD  Division of Vascular Surgery   Pager: [phone]                  "

## 2021-07-08 NOTE — PLAN OF CARE
[id number]:    [place] of left upper extremity upper arm cephalic vein. Above-knee popliteal to below-knee popliteal artery bypass using left upper extremity translocated cephalic vein by  POD 0.     Pt is A&Ox4. VSS on RA, bradycardiac. LS diminished ,clear bilaterally. Left arm dressing covered with Aquacel, dressing marked no change. Right thigh and lower leg incision marked no changes. Pt reports bilateral numbness and tingling in all 4 extrimities. Pluses found with doppler. Pain managed with IV Dilaudid and scheduled Tylenol. PIV infusing NS @ 74 ml/hr. Alvarez patent with adequate urinary output. Tolerating regular diet, thin liquids. Takes pills whole. Not out of bed this this. Discharge plan pending pt progress.

## 2021-07-08 NOTE — PROGRESS NOTES
HOSPITALIST CONSULT CHART CHECK:    Hospitalist service was consulted for after hours cross coverage only. We will peripherally follow and chart check.     - For medical concerns during business hours, call the [facility] at [phone] to have the rounding/on call Vascular Medicine (NOT VASCULAR SURGERY) MD paged.    - After business hours, for medical concerns on this patient, please page Hospitalist staff.    - For vascular surgical questions, please page the appropriate surgeon (primary vascular surgeon or on call vascular surgeon) based upon the time of day.       Chaim Headley PA-C

## 2021-07-08 NOTE — PROGRESS NOTES
[facility]    Vascular Medicine progress note     Date of Admission:  7/7/2021  Date of Consult (When I saw the patient): 07/07/21    Assessment & Plan   1. Critical limb ischemia with history of right aortobifemoral graft limb to above knee popliteal bypass using cadaveric SFA in 9/2020 now with reocclusion of native popliteal artery at the distal cadaveric conduit anastomosis s/p above-knee cadaveric bypass to below knee popliteal artery bypass with left upper extremity cephalic vein conduit 7/7/21    Post-operative cares per Dr. Hernandez / Vascular Surgery. She had been on Plavix and aspirin prior to admission. Resume these when able. She must quit smoking cmpletely.      2. Symptomatic high grade left carotid artery stenosis s/p left carotid endarterectomy 6/8/20 and prior left carotid endarterectomy in the remote past.     She has recovered well from this surgery. She does have some new moderate restenosis to 60% on the right post right carotid CEA in the past and 50-69% stenosis on the left as of 4/2021. She is currently asymptomatic. This will be followed as an outpatient by Dr. Lozano.      3. Hyperlipidemia     Her lipid panel this admission shows an LDL of 76 while on Rosuvastatin 40 mg daily. Given her severe PAD and carotid disease, it is recommended that she be treated to an LDL of less than 70. As such, we will add Zetia 10 mg daily to her Rosuvastatin.      4. Hypertension     Continue prior to admission carvedilol and lisinopril and monitor blood pressure closely.      5. Tobacco use     She quit smoking about 2 weeks ago and has been using 21 mg nicotine patches. These will be continued for her in the hospital. The importance of tobacco cessation was stressed.               History of Present Illness   Shirley Hendricks is a 60 year old female smoker and  normally followed by Dr. Narvaez who has a longstanding relationship with Dr. Chadwick Lozano of  Vascular Surgery due to her prominent peripheral arterial disease and carotid disease.  She has previously had problems primarily with iliac occlusive disease and underwent angioplasty and stenting of her right common iliac artery in 2000.  Her right external iliac artery occluded in 03/2004, and she underwent recanalization stenting at that time. In 2010 she developed worsening claudication symptoms in her left leg and due to the very small size of her diseased superficial femoral artery, the patient was taken to the operating room for a left common femoral artery to above-knee popliteal bypass with right greater saphenous vein as conduit.        Unfortunately she continued to smoke. In 6/2016 she underwent a right carotid endarterectomy for symptomatic stenosis followed more recently by a left carotid endarterectomy on 6/8/20 for symptomatic left carotid artery stenosis. She has now recovered from this.      She continued to have worsening right lower extremity claudication. She had a known right SFA occlusion. On 9/23/20 she underwent a right aortobifemoral graft limb to above knee popliteal bypass using cadaveric SFA. On follow-up surveillance imaging she was noted to have occlusion of the native popliteal artery with some wall thrombus in the graft. Collaterals were keeping the graft patent. She did admit to some recurrent claudication symptoms in the right calf. An angiogram with angioplasty and suction thrombectomy was undertaken on 5/25/21.     In early June she noticed increased pain in her right leg that wakes her up at night. The pain is relieved by dangling her legs and walking around. She does not have pain during the day however she does report a soreness that develops which is relieved by sitting or lying down. She checks the pulse in her graft daily at the right medial thigh and reports this has been consistent and normal. Arterial duplex on 6/14/21 confirmed reocclusion of native above-knee  popliteal artery distal to the right lower extremity bypass and high velocity at the distal anastomosis of the bypass graft. She subsequently underwent a diagnostic angiogram on 6/23/21 and it was determined that she would benefit from undergoing an above-knee cadaveric bypass to below knee popliteal artery bypass with left upper extremity cephalic vein conduit. She presented for this today.          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 Simons dermatology     Embolism and thrombosis of unspecified artery (H) 08/2000    Protein C,S, Leiden FV, Lupus Inhibitor Negative     Gastroesophageal reflux disease      Hyperlipidaemia      Hypertension      Lupus (H)     skin     Mild major depression (H) 11/07/2017     Myocardial infarction (H)     x3     Osteoarthrosis, unspecified whether generalized or localized, unspecified site      PAD (peripheral artery disease) (H)      Peripheral vascular disease, unspecified (H) 12/2000    s/p angioplasty with stent right femoral a.; Right iliac and femoral a. clot     Post-menopausal      Reflux esophagitis 02/2004    EGD: esophagitis and medium HH     SVT (supraventricular tachycardia) (H)      Thrombocytopenia (H)      Uncomplicated asthma      Vitamin C deficiency 08/12/2018       Past Surgical History   Past Surgical History:   Procedure Laterality Date     ANGIOGRAM       ANGIOGRAM Right 6/23/2021    Procedure: RIGHT LOWER EXTREMITY ANGIOGRAM WITH LEFT BRACHIAL ARTERY CUTDOWN;  Surgeon: José Luis Hernandez MD;  Location: [facility] OR     BYPASS GRAFT FEMOROPOPLITEAL Right 09/23/2020    Procedure: RIGHT FEMORAL GRAFT TO ABOVE-KNEE POPLITEAL BYPASS USING CADAVERIC SUPERFICIAL FEMORAL ARTERY;  Surgeon: Chadwick Lozano MD;   Location:  OR     C FABRIC WRAPPING OF ABDOMINAL ANEURYSM       CARDIAC CATHERIZATION  09/03/2009    multivessel CAD without target lesions, med mgmt indicated, preserved ef     CARPAL TUNNEL RELEASE RT/LT Right 05/20/2021     ENDARTERECTOMY CAROTID Right 05/11/2016    Procedure: ENDARTERECTOMY CAROTID;  Surgeon: Chadwick Lozano MD;  Location: [facility] OR     ENDARTERECTOMY CAROTID Left 06/08/2020    Procedure: LEFT CAROTID ENDARTERECTOMY with distal facal vein patch  and EEG;  Surgeon: Chadwick Lozano MD;  Location:  OR     GYN SURGERY  left tube    left salpingectomy     IR LOWER EXTREMITY ANGIOGRAM RIGHT  05/25/2021     IR OR ANGIOGRAM  6/23/2021     LAPAROSCOPIC CHOLECYSTECTOMY N/A 09/27/2017    Procedure: LAPAROSCOPIC CHOLECYSTECTOMY;  LAPAROSCOPIC CHOLECYSTECTOMY;  Surgeon: Jacoby Tapia MD;  Location: [facility]     ORTHOPEDIC SURGERY      left knee surgery     VASCULAR SURGERY  aoto bi fem  left fem-AK pop     [facility] NONSPECIFIC PROCEDURE  12/2000    angioplasty with stent right fem. a.     [facility] NONSPECIFIC PROCEDURE  1987    sinus surgery     [facility] NONSPECIFIC PROCEDURE  09/02/2009    Emergent left groin exploration with oversewing of bleeding angiographic site. 2. Endarterectomy of common femoral-proximal superficial femoral artery with greater saphenous vein patch angioplasty.   a. [place] of accessory right greater saphenous vein.      [facility] NONSPECIFIC PROCEDURE  08/27/2009    occluded left common iliac and external iliac arteries were successfully revascularized with stenting to 8 and 7 mm        Prior to Admission Medications   Prior to Admission Medications   Prescriptions Last Dose Informant Patient Reported? Taking?   Calcium Carbonate-Vitamin D3 (CALCIUM 600-D) 600-400 MG-UNIT TABS 7/6/2021 at PM Self Yes Yes   Sig: Take 1 tablet by mouth every evening   acetaminophen (TYLENOL) 500 MG tablet 7/6/2021 at PRN Self Yes Yes   Sig: Take 500-1,000 mg by mouth every 6 hours as needed for mild  pain   aspirin (ASA) 81 MG chewable tablet MORE THAN THREE WEEKS Self No Yes   Sig: Take 1 tablet (81 mg) by mouth daily   carvedilol (COREG) 6.25 MG tablet 7/6/2021 at AM Self Yes Yes   Sig: Take 6.25 mg by mouth 2 times daily (with meals)   clopidogrel (PLAVIX) 75 MG tablet 7/7/2021 at AM Self No Yes   Sig: Take 1 tablet (75 mg) by mouth daily   denosumab (PROLIA) 60 MG/ML SOLN injection More than a month at Unknown time Self No No   Sig: Inject 1 mL (60 mg) Subcutaneous every 6 months INDICATION: TO TREAT OSTEOPOROSIS   diphenhydrAMINE (BENADRYL) 25 MG tablet Past Month at PRN Self Yes Yes   Sig: Take 25-50 mg by mouth once as needed for itching or allergies (30 minutes to 2 hours before procedure using contrast)   fluticasone-vilanterol (BREO ELLIPTA) 200-25 MCG/INH inhaler 7/7/2021 at AM Self Yes Yes   Sig: Inhale 1 puff into the lungs daily   lisinopril (ZESTRIL) 20 MG tablet 7/5/2021 at AM Self Yes Yes   Sig: Take 20 mg by mouth every morning   methylPREDNISolone (MEDROL) 16 MG tablet  at PRN Self No Yes   Sig: Take 32 mg by mouth 12 hours before the procedure and repeat 32 mg by mouth 2 hours before the procedure to prevent contrast reaction.   nicotine (NICODERM CQ) 14 MG/24HR 24 hr patch 7/5/2021 Self No Yes   Sig: Place 1 patch onto the skin every 24 hours   nicotine (NICODERM CQ) 21 MG/24HR 24 hr patch 7/6/2021 at AM Self No Yes   Sig: Place 1 patch onto the skin every 24 hours   nicotine (NICODERM CQ) 7 MG/24HR 24 hr patch  Self No Yes   Sig: Place 1 patch onto the skin every 24 hours Start after completing 14 mg patches.   nitroGLYcerin (NITROSTAT) 0.4 MG sublingual tablet  Self No Yes   Sig: Place 1 tablet (0.4 mg) under the tongue See Admin Instructions for chest pain   omeprazole (PRILOSEC) 20 MG DR capsule 7/6/2021 at AM Self Yes Yes   Sig: Take 20 mg by mouth every morning   rosuvastatin (CRESTOR) 40 MG tablet 7/6/2021 at PM Self No Yes   Sig: Take 1 tablet (40 mg) by mouth At Bedtime       Facility-Administered Medications: None     Allergies   Allergies   Allergen Reactions     Contrast Dye Anaphylaxis     RASH, FACIAL AND NECK SWELLING, SOB, WHEEZING     Pantoprazole      Protonix caused diffuse edema     Chantix [Varenicline]      Terrible dreams     Penicillins Itching       Social History   Shirley Hendricks  reports that she has been smoking cigarettes. She started smoking about 60 years ago. She has a 52.00 pack-year smoking history. She has never used smokeless tobacco. She reports current alcohol use. She reports that she does not use drugs. She works as a .     Family History   Family History   Problem Relation Age of Onset     Cancer Mother         bladder     Cardiovascular Father         alive,multiple heart attacks     Diabetes Maternal Grandmother      Lung Cancer Maternal Grandmother      Blood Disease Brother         clotting disorder       Review of Systems   The 10 point Review of Systems is negative other than noted in the HPI or here.     Physical Exam   Temp: 98.4  F (36.9  C) Temp src: Oral BP: (!) 141/70 Pulse: 60   Resp: 16 SpO2: 93 % O2 Device: None (Room air) Oxygen Delivery: 2 LPM  Vital Signs with Ranges  Temp:  [97.2  F (36.2  C)-98.4  F (36.9  C)] 98.4  F (36.9  C)  Pulse:  [48-68] 60  Resp:  [9-21] 16  BP: (101-143)/(53-81) 141/70  MAP:  [80 mmHg-129 mmHg] 80 mmHg  Arterial Line BP: (130-194)/(52-84) 130/52  SpO2:  [93 %-100 %] 93 %  106 lbs 0 oz    Constitutional: awake, alert, cooperative, no apparent distress, and appears stated age  Eyes: Lids and lashes normal, pupils equal, round and reactive to light, extra ocular muscles intact, sclera clear, conjunctiva normal  ENT: normocepalic, without obvious abnormality, oropharynx pink and moist  Hematologic / Lymphatic: no lymphadenopathy  Respiratory: No increased work of breathing, good air exchange, clear to auscultation bilaterally, no crackles or wheezing  Cardiovascular: regular rate and rhythm,  normal S1 and S2 and no murmur noted  GI: Normal bowel sounds, soft, non-distended, non-tender  Skin: no redness, warmth, or swelling, no rashes. Surgical sites are c/d/i.   Musculoskeletal: There is no redness, warmth, or swelling of the joints.  Full range of motion noted.  Motor strength is 5 out of 5 all extremities bilaterally.  Tone is normal. Right leg is warm.   Neurologic: Awake, alert, oriented to name, place and time.  Cranial nerves II-XII are grossly intact.  Motor is 5 out of 5 bilaterally.    Neuropsychiatric:  Normal affect, memory, insight.    Data   Most Recent 3 CBC's:  Recent Labs   Lab Test 07/08/21  0647 06/21/21  1411 05/25/21  1731   WBC 7.3 4.8 7.8   HGB 8.8* 12.4 11.9   MCV 93 97 92    124* 122*     Most Recent 3 BMP's:  Recent Labs   Lab Test 07/08/21  0647 07/07/21  0624 06/23/21  1134 06/21/21  1411 01/05/21  1119   * 134 132* 130* 134   POTASSIUM 4.1 4.1 4.3 4.6 4.4   CHLORIDE 101  --   --  100 104   CO2 21  --   --  30 27   BUN 13  --   --  13 11   CR 0.62  --   --  0.65 0.58   ANIONGAP 6  --   --  <1* 3   SONIA 8.3*  --   --  8.7 9.0   GLC 86  --   --  78 89     Most Recent 3 INR's:  Recent Labs   Lab Test 09/24/20  0739 05/10/16  1208 04/21/16  1400   INR 1.01 0.95 1.02     Most Recent Cholesterol Panel:  Recent Labs   Lab Test 07/07/21  0624   CHOL 143   LDL 76   HDL 53   TRIG 71     Most Recent Hemoglobin A1c:  Recent Labs   Lab Test 09/23/20  0844   A1C 5.4

## 2021-07-09 ENCOUNTER — APPOINTMENT (OUTPATIENT)
Dept: PHYSICAL THERAPY | Facility: CLINIC | Age: 61
DRG: 253 | End: 2021-07-09
Attending: SURGERY
Payer: COMMERCIAL

## 2021-07-09 VITALS
HEART RATE: 54 BPM | OXYGEN SATURATION: 98 % | HEIGHT: 62 IN | SYSTOLIC BLOOD PRESSURE: 99 MMHG | RESPIRATION RATE: 18 BRPM | BODY MASS INDEX: 19.51 KG/M2 | DIASTOLIC BLOOD PRESSURE: 49 MMHG | TEMPERATURE: 98 F | WEIGHT: 106 LBS

## 2021-07-09 DIAGNOSIS — Z98.890 CRITICAL ISCHEMIA OF EXTREMITY WITH HISTORY OF REVASCULARIZATION OF SAME EXTREMITY (H): Primary | ICD-10-CM

## 2021-07-09 DIAGNOSIS — I70.229 CRITICAL ISCHEMIA OF EXTREMITY WITH HISTORY OF REVASCULARIZATION OF SAME EXTREMITY (H): Primary | ICD-10-CM

## 2021-07-09 LAB
ANION GAP SERPL CALCULATED.3IONS-SCNC: 2 MMOL/L (ref 3–14)
BASOPHILS # BLD AUTO: 0.1 10E9/L (ref 0–0.2)
BASOPHILS NFR BLD AUTO: 1.3 %
BUN SERPL-MCNC: 13 MG/DL (ref 7–30)
CALCIUM SERPL-MCNC: 8.6 MG/DL (ref 8.5–10.1)
CHLORIDE SERPL-SCNC: 103 MMOL/L (ref 94–109)
CO2 SERPL-SCNC: 27 MMOL/L (ref 20–32)
CREAT SERPL-MCNC: 0.77 MG/DL (ref 0.52–1.04)
DIFFERENTIAL METHOD BLD: ABNORMAL
EOSINOPHIL # BLD AUTO: 0.4 10E9/L (ref 0–0.7)
EOSINOPHIL NFR BLD AUTO: 8.3 %
ERYTHROCYTE [DISTWIDTH] IN BLOOD BY AUTOMATED COUNT: 12.5 % (ref 10–15)
GFR SERPL CREATININE-BSD FRML MDRD: 83 ML/MIN/{1.73_M2}
GLUCOSE SERPL-MCNC: 98 MG/DL (ref 70–99)
HCT VFR BLD AUTO: 27.3 % (ref 35–47)
HGB BLD-MCNC: 8.8 G/DL (ref 11.7–15.7)
IMM GRANULOCYTES # BLD: 0 10E9/L (ref 0–0.4)
IMM GRANULOCYTES NFR BLD: 0.6 %
LYMPHOCYTES # BLD AUTO: 0.9 10E9/L (ref 0.8–5.3)
LYMPHOCYTES NFR BLD AUTO: 17.2 %
MAGNESIUM SERPL-MCNC: 1.7 MG/DL (ref 1.6–2.3)
MCH RBC QN AUTO: 30.6 PG (ref 26.5–33)
MCHC RBC AUTO-ENTMCNC: 32.2 G/DL (ref 31.5–36.5)
MCV RBC AUTO: 95 FL (ref 78–100)
MONOCYTES # BLD AUTO: 0.4 10E9/L (ref 0–1.3)
MONOCYTES NFR BLD AUTO: 7.4 %
NEUTROPHILS # BLD AUTO: 3.5 10E9/L (ref 1.6–8.3)
NEUTROPHILS NFR BLD AUTO: 65.2 %
NRBC # BLD AUTO: 0 10*3/UL
NRBC BLD AUTO-RTO: 0 /100
PHOSPHATE SERPL-MCNC: 4 MG/DL (ref 2.5–4.5)
PLATELET # BLD AUTO: 169 10E9/L (ref 150–450)
POTASSIUM SERPL-SCNC: 5.2 MMOL/L (ref 3.4–5.3)
RBC # BLD AUTO: 2.88 10E12/L (ref 3.8–5.2)
SODIUM SERPL-SCNC: 132 MMOL/L (ref 133–144)
WBC # BLD AUTO: 5.3 10E9/L (ref 4–11)

## 2021-07-09 PROCEDURE — 250N000013 HC RX MED GY IP 250 OP 250 PS 637: Performed by: SURGERY

## 2021-07-09 PROCEDURE — 83735 ASSAY OF MAGNESIUM: CPT | Performed by: SURGERY

## 2021-07-09 PROCEDURE — 250N000011 HC RX IP 250 OP 636: Performed by: SURGERY

## 2021-07-09 PROCEDURE — 99232 SBSQ HOSP IP/OBS MODERATE 35: CPT | Performed by: INTERNAL MEDICINE

## 2021-07-09 PROCEDURE — 84100 ASSAY OF PHOSPHORUS: CPT | Performed by: SURGERY

## 2021-07-09 PROCEDURE — 85025 COMPLETE CBC W/AUTO DIFF WBC: CPT | Performed by: SURGERY

## 2021-07-09 PROCEDURE — 36415 COLL VENOUS BLD VENIPUNCTURE: CPT | Performed by: SURGERY

## 2021-07-09 PROCEDURE — 97530 THERAPEUTIC ACTIVITIES: CPT | Mod: GP | Performed by: PHYSICAL THERAPIST

## 2021-07-09 PROCEDURE — 80048 BASIC METABOLIC PNL TOTAL CA: CPT | Performed by: SURGERY

## 2021-07-09 PROCEDURE — 250N000013 HC RX MED GY IP 250 OP 250 PS 637: Performed by: PHYSICIAN ASSISTANT

## 2021-07-09 PROCEDURE — 97116 GAIT TRAINING THERAPY: CPT | Mod: GP | Performed by: PHYSICAL THERAPIST

## 2021-07-09 RX ORDER — OXYCODONE HYDROCHLORIDE 5 MG/1
5 TABLET ORAL EVERY 6 HOURS PRN
Qty: 20 TABLET | Refills: 0 | Status: SHIPPED | OUTPATIENT
Start: 2021-07-09 | End: 2021-07-16

## 2021-07-09 RX ORDER — OXYCODONE HYDROCHLORIDE 5 MG/1
5 TABLET ORAL EVERY 6 HOURS PRN
Qty: 20 TABLET | Refills: 0 | Status: SHIPPED | OUTPATIENT
Start: 2021-07-09 | End: 2021-07-09

## 2021-07-09 RX ADMIN — ACETAMINOPHEN 975 MG: 325 TABLET, FILM COATED ORAL at 05:58

## 2021-07-09 RX ADMIN — NICOTINE 1 PATCH: 21 PATCH, EXTENDED RELEASE TRANSDERMAL at 09:41

## 2021-07-09 RX ADMIN — OXYCODONE HYDROCHLORIDE 10 MG: 5 TABLET ORAL at 06:08

## 2021-07-09 RX ADMIN — OMEPRAZOLE 20 MG: 20 CAPSULE, DELAYED RELEASE ORAL at 09:40

## 2021-07-09 RX ADMIN — CLOPIDOGREL BISULFATE 75 MG: 75 TABLET ORAL at 09:36

## 2021-07-09 RX ADMIN — HEPARIN SODIUM 5000 UNITS: 5000 INJECTION, SOLUTION INTRAVENOUS; SUBCUTANEOUS at 05:58

## 2021-07-09 RX ADMIN — CARVEDILOL 6.25 MG: 6.25 TABLET, FILM COATED ORAL at 09:36

## 2021-07-09 RX ADMIN — ASPIRIN 81 MG CHEWABLE TABLET 81 MG: 81 TABLET CHEWABLE at 09:36

## 2021-07-09 ASSESSMENT — ACTIVITIES OF DAILY LIVING (ADL)
ADLS_ACUITY_SCORE: 11
ADLS_ACUITY_SCORE: 13

## 2021-07-09 NOTE — PROGRESS NOTES
"VASCULAR SURGERY PROGRESS NOTE    Subjective:  Doing well. Resting comfortably in bed. Complaining of some incisional pain but reports that it is well controlled with oxycodone. Reports that she already ambulated this morning.     Objective:    Intake/Output Summary (Last 24 hours) at 7/8/2021 0834  Last data filed at 7/8/2021 0804  Gross per 24 hour   Intake 1675 ml   Output 1175 ml   Net 500 ml     PHYSICAL EXAM:  BP 99/49 (BP Location: Right arm)   Pulse 54   Temp 98  F (36.7  C) (Oral)   Resp 18   Ht 1.562 m (5' 1.5\")   Wt 48.1 kg (106 lb)   SpO2 98%   BMI 19.70 kg/m    Well-appearing; no acute distress  Unlabored on room air    Palpable R DP pulse; Dressing with some strikethrough bleeding.       ASSESSMENT:  S/p R above knee popliteal to below knee popliteal artery bypass using left upper extremity reversed cephalic vein for right lower extremity critical limb ischemia (7/7)      PLAN:  - PRN pain medications  - Aspirin, plavix, rosvustatin Zetia  - Regular diet; Saline lock IV  - Physical therapy consultation   - Heparin 5000 subQ  - Dispo: Floor; plan for discharge today     Discussed pt history, exam, assessment and plan with Dr. Hernandez of the vascular surgery service, who is in agreement with the above.    Justice Beach MD  Division of Vascular Surgery   Pager: [phone]                  "

## 2021-07-09 NOTE — PLAN OF CARE
Physical Therapy Discharge Summary    Reason for therapy discharge:    All goals and outcomes met, no further needs identified.    Progress towards therapy goal(s). See goals on Care Plan in Epic electronic health record for goal details.  Goals met    Therapy recommendation(s):    No further therapy is recommended.Will issue FWW for safe ambulation at home. Patient has met all inpatient PT goals and is appropriate for discharge to home with family assist once medically cleared/discharged.

## 2021-07-09 NOTE — DISCHARGE SUMMARY
[facility] Discharge Summary    Shirley Hendricks MRN# [id number]   Age: 60 year old YOB: 1960     Date of Admission:  7/7/2021  Date of Discharge::  7/9/2021  Admitting Physician:  José Luis Hernandez MD  Discharge Physician:  MD Mary           Admission Diagnoses:   Critical ischemia of extremity with history of revascularization of same extremity [I99.8, Z95.9]          Discharge Diagnosis:   Critical ischemia of extremity with history of revascularization of same extremity [I99.8, Z95.9]          Procedures:   Procedure(s):     1.  [place] of left upper extremity upper arm cephalic vein.  2.  Above-knee popliteal to below-knee popliteal artery bypass using left upper extremity translocated cephalic vein.            Medications Prior to Admission:     Medications Prior to Admission   Medication Sig Dispense Refill Last Dose     acetaminophen (TYLENOL) 500 MG tablet Take 500-1,000 mg by mouth every 6 hours as needed for mild pain   7/6/2021 at PRN     aspirin (ASA) 81 MG chewable tablet Take 1 tablet (81 mg) by mouth daily 30 tablet 3 MORE THAN THREE WEEKS     Calcium Carbonate-Vitamin D3 (CALCIUM 600-D) 600-400 MG-UNIT TABS Take 1 tablet by mouth every evening   7/6/2021 at PM     carvedilol (COREG) 6.25 MG tablet Take 6.25 mg by mouth 2 times daily (with meals)   7/6/2021 at AM     clopidogrel (PLAVIX) 75 MG tablet Take 1 tablet (75 mg) by mouth daily 90 tablet 3 7/7/2021 at AM     diphenhydrAMINE (BENADRYL) 25 MG tablet Take 25-50 mg by mouth once as needed for itching or allergies (30 minutes to 2 hours before procedure using contrast)   Past Month at PRN     fluticasone-vilanterol (BREO ELLIPTA) 200-25 MCG/INH inhaler Inhale 1 puff into the lungs daily   7/7/2021 at AM     lisinopril (ZESTRIL) 20 MG tablet Take 20 mg by mouth every morning   7/5/2021 at AM     methylPREDNISolone (MEDROL) 16 MG tablet Take 32 mg by mouth 12 hours before the procedure and repeat 32 mg by  mouth 2 hours before the procedure to prevent contrast reaction. 4 tablet 0  at PRN     nicotine (NICODERM CQ) 14 MG/24HR 24 hr patch Place 1 patch onto the skin every 24 hours 30 patch 1 7/5/2021     nicotine (NICODERM CQ) 21 MG/24HR 24 hr patch Place 1 patch onto the skin every 24 hours 30 patch 0 7/6/2021 at AM     nicotine (NICODERM CQ) 7 MG/24HR 24 hr patch Place 1 patch onto the skin every 24 hours Start after completing 14 mg patches. 30 patch 1      nitroGLYcerin (NITROSTAT) 0.4 MG sublingual tablet Place 1 tablet (0.4 mg) under the tongue See Admin Instructions for chest pain 30 tablet 11      omeprazole (PRILOSEC) 20 MG DR capsule Take 20 mg by mouth every morning   7/6/2021 at AM     rosuvastatin (CRESTOR) 40 MG tablet Take 1 tablet (40 mg) by mouth At Bedtime 90 tablet 2 7/6/2021 at PM     denosumab (PROLIA) 60 MG/ML SOLN injection Inject 1 mL (60 mg) Subcutaneous every 6 months INDICATION: TO TREAT OSTEOPOROSIS 1 Syringe 0 More than a month at Unknown time             Discharge Medications:     Current Discharge Medication List      START taking these medications    Details   oxyCODONE (ROXICODONE) 5 MG tablet Take 1 tablet (5 mg) by mouth every 6 hours as needed for pain  Qty: 20 tablet, Refills: 0    Associated Diagnoses: Critical ischemia of extremity with history of revascularization of same extremity         CONTINUE these medications which have NOT CHANGED    Details   acetaminophen (TYLENOL) 500 MG tablet Take 500-1,000 mg by mouth every 6 hours as needed for mild pain      aspirin (ASA) 81 MG chewable tablet Take 1 tablet (81 mg) by mouth daily  Qty: 30 tablet, Refills: 3    Associated Diagnoses: PAD (peripheral artery disease) (H)      Calcium Carbonate-Vitamin D3 (CALCIUM 600-D) 600-400 MG-UNIT TABS Take 1 tablet by mouth every evening      carvedilol (COREG) 6.25 MG tablet Take 6.25 mg by mouth 2 times daily (with meals)      clopidogrel (PLAVIX) 75 MG tablet Take 1 tablet (75 mg) by mouth  daily  Qty: 90 tablet, Refills: 3    Associated Diagnoses: Peripheral vascular disease (H)      diphenhydrAMINE (BENADRYL) 25 MG tablet Take 25-50 mg by mouth once as needed for itching or allergies (30 minutes to 2 hours before procedure using contrast)      fluticasone-vilanterol (BREO ELLIPTA) 200-25 MCG/INH inhaler Inhale 1 puff into the lungs daily      lisinopril (ZESTRIL) 20 MG tablet Take 20 mg by mouth every morning      methylPREDNISolone (MEDROL) 16 MG tablet Take 32 mg by mouth 12 hours before the procedure and repeat 32 mg by mouth 2 hours before the procedure to prevent contrast reaction.  Qty: 4 tablet, Refills: 0    Associated Diagnoses: Contrast media allergy      nicotine (NICODERM CQ) 14 MG/24HR 24 hr patch Place 1 patch onto the skin every 24 hours  Qty: 30 patch, Refills: 1    Associated Diagnoses: Tobacco use disorder      nicotine (NICODERM CQ) 21 MG/24HR 24 hr patch Place 1 patch onto the skin every 24 hours  Qty: 30 patch, Refills: 0    Associated Diagnoses: Tobacco use disorder      nicotine (NICODERM CQ) 7 MG/24HR 24 hr patch Place 1 patch onto the skin every 24 hours Start after completing 14 mg patches.  Qty: 30 patch, Refills: 1    Associated Diagnoses: Tobacco use disorder      nitroGLYcerin (NITROSTAT) 0.4 MG sublingual tablet Place 1 tablet (0.4 mg) under the tongue See Admin Instructions for chest pain  Qty: 30 tablet, Refills: 11    Associated Diagnoses: Coronary artery disease involving native coronary artery of native heart without angina pectoris      omeprazole (PRILOSEC) 20 MG DR capsule Take 20 mg by mouth every morning      rosuvastatin (CRESTOR) 40 MG tablet Take 1 tablet (40 mg) by mouth At Bedtime  Qty: 90 tablet, Refills: 2    Associated Diagnoses: Hyperlipidemia LDL goal <70      denosumab (PROLIA) 60 MG/ML SOLN injection Inject 1 mL (60 mg) Subcutaneous every 6 months INDICATION: TO TREAT OSTEOPOROSIS  Qty: 1 Syringe, Refills: 0    Associated Diagnoses: Osteoporosis  without current pathological fracture, unspecified osteoporosis type                   Consultations:   Physical therapy           Brief History of Illness:   This is a 60-year-old female with generalized multiterritory atherosclerosis.  She has had a previous aortobifemoral bypass grafting and a right femoropopliteal bypass grafting with cadaveric social femoral artery.  She had developed high-grade stenosis of the right above-knee popliteal artery distal to the previous anastomosis.  Endovascular intervention was attempted and resulted in embolization and occlusion of the right posterior tibial artery.  Since that time patient has developed ischemic rest pain and short distance claudication.  Revascularization is advised for limb salvage.  He has no pain in the lower extremity that can be used.  We had marked the left upper extremity cephalic vein.           Hospital Course:   The patient's hospital course was unremarkable.  She recovered as anticipated and experienced no post-operative complications. She had some expected pain along her incisions on her right leg and left arm. She was discharged on POD 2. On day of discharge, she was ambulating with assistance. She was tolerating diet. She had a palpable R DP pulse. She was voiding appropriately and her pain was well controlled. She was hemodynamically stable.           Discharge Instructions and Follow-Up:   Discharge diet: Regular   Discharge activity: Activity as tolerated   Discharge follow-up: Follow up with Dr. Hernandez in 2 weeks   Wound care: May get incision wet in shower but do not soak or scrub           Discharge Disposition:   Discharged to home        Justice Beach MD

## 2021-07-09 NOTE — PROGRESS NOTES
VS WNL except baseline bradycardia. A/Ox4. Incisions CDI - dressings marked and extended. Pain controlled with oxycodone. Up independently. Diet regular. BS active. Flatus passing. Voiding adequately. LS CTA though productive, smokers cough. IS to 1500. Ambulated a number of times during the shift.

## 2021-07-09 NOTE — PLAN OF CARE
A&O x4. VSS except hypotension on RA. . Doppler pulses on feet. C/o Numbness and tingling. Neuros intact. Lungs clear. BS hypoactive, BM-, flatus+. Incisions CDI (leg dressings had small amount of bleeding after first walk. Changed dressings). Tolerating regular diet. - N/V. Voiding  Good UOP. Oxycodone for pain. Up assist of 1. Pt improving. Able to ambulate better as the shift went on.

## 2021-07-09 NOTE — PROGRESS NOTES
[facility]    Vascular Medicine progress note     Date of Admission:  7/7/2021  Date of Consult (When I saw the patient): 07/07/21    Assessment & Plan   1. Critical limb ischemia with history of right aortobifemoral graft limb to above knee popliteal bypass using cadaveric SFA in 9/2020 now with reocclusion of native popliteal artery at the distal cadaveric conduit anastomosis s/p above-knee cadaveric bypass to below knee popliteal artery bypass with left upper extremity cephalic vein conduit 7/7/21    Post-operative cares per Dr. Hernandez / Vascular Surgery. She had been on Plavix and aspirin prior to admission. Resume these when able. She must quit smoking cmpletely.    We will continue with aspirin, Plavix, and lovastatin in addition to her Zetia     2. Symptomatic high grade left carotid artery stenosis s/p left carotid endarterectomy 6/8/20 and prior left carotid endarterectomy in the remote past.     She has recovered well from this surgery. She does have some new moderate restenosis to 60% on the right post right carotid CEA in the past and 50-69% stenosis on the left as of 4/2021. She is currently asymptomatic. This will be followed as an outpatient by Dr. Lozano.      3. Hyperlipidemia     Her lipid panel this admission shows an LDL of 76 while on Rosuvastatin 40 mg daily. Given her severe PAD and carotid disease, it is recommended that she be treated to an LDL of less than 70. As such, we will add Zetia 10 mg daily to her Rosuvastatin.      4. Hypertension     Continue prior to admission carvedilol and lisinopril and monitor blood pressure closely.      5. Tobacco use     She quit smoking about 2 weeks ago and has been using 21 mg nicotine patches. These will be continued for her in the hospital. The importance of tobacco cessation was stressed.               History of Present Illness   Shirley Hendricks is a 60 year old female smoker and  normally followed by   [facility] who has a longstanding relationship with Dr. Chadwick Lozano of Vascular Surgery due to her prominent peripheral arterial disease and carotid disease.  She has previously had problems primarily with iliac occlusive disease and underwent angioplasty and stenting of her right common iliac artery in 2000.  Her right external iliac artery occluded in 03/2004, and she underwent recanalization stenting at that time. In 2010 she developed worsening claudication symptoms in her left leg and due to the very small size of her diseased superficial femoral artery, the patient was taken to the operating room for a left common femoral artery to above-knee popliteal bypass with right greater saphenous vein as conduit.        Unfortunately she continued to smoke. In 6/2016 she underwent a right carotid endarterectomy for symptomatic stenosis followed more recently by a left carotid endarterectomy on 6/8/20 for symptomatic left carotid artery stenosis. She has now recovered from this.      She continued to have worsening right lower extremity claudication. She had a known right SFA occlusion. On 9/23/20 she underwent a right aortobifemoral graft limb to above knee popliteal bypass using cadaveric SFA. On follow-up surveillance imaging she was noted to have occlusion of the native popliteal artery with some wall thrombus in the graft. Collaterals were keeping the graft patent. She did admit to some recurrent claudication symptoms in the right calf. An angiogram with angioplasty and suction thrombectomy was undertaken on 5/25/21.     In early June she noticed increased pain in her right leg that wakes her up at night. The pain is relieved by dangling her legs and walking around. She does not have pain during the day however she does report a soreness that develops which is relieved by sitting or lying down. She checks the pulse in her graft daily at the right medial thigh and reports this has been consistent and normal. Arterial  duplex on 6/14/21 confirmed reocclusion of native above-knee popliteal artery distal to the right lower extremity bypass and high velocity at the distal anastomosis of the bypass graft. She subsequently underwent a diagnostic angiogram on 6/23/21 and it was determined that she would benefit from undergoing an above-knee cadaveric bypass to below knee popliteal artery bypass with left upper extremity cephalic vein conduit. She presented for this today.          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 Simons dermatology     Embolism and thrombosis of unspecified artery (H) 08/2000    Protein C,S, Leiden FV, Lupus Inhibitor Negative     Gastroesophageal reflux disease      Hyperlipidaemia      Hypertension      Lupus (H)     skin     Mild major depression (H) 11/07/2017     Myocardial infarction (H)     x3     Osteoarthrosis, unspecified whether generalized or localized, unspecified site      PAD (peripheral artery disease) (H)      Peripheral vascular disease, unspecified (H) 12/2000    s/p angioplasty with stent right femoral a.; Right iliac and femoral a. clot     Post-menopausal      Reflux esophagitis 02/2004    EGD: esophagitis and medium HH     SVT (supraventricular tachycardia) (H)      Thrombocytopenia (H)      Uncomplicated asthma      Vitamin C deficiency 08/12/2018       Past Surgical History   Past Surgical History:   Procedure Laterality Date     ANGIOGRAM       ANGIOGRAM Right 6/23/2021    Procedure: RIGHT LOWER EXTREMITY ANGIOGRAM WITH LEFT BRACHIAL ARTERY CUTDOWN;  Surgeon: José Luis Hernandez MD;  Location: [facility] OR     BYPASS GRAFT FEMOROPOPLITEAL Right 09/23/2020    Procedure: RIGHT FEMORAL GRAFT TO ABOVE-KNEE POPLITEAL BYPASS USING CADAVERIC  SUPERFICIAL FEMORAL ARTERY;  Surgeon: Chadwick Lozano MD;  Location:  OR     BYPASS GRAFT INSITU FEMOROPOPLITEAL Right 7/7/2021    Procedure: CREATION RIGHT FEMORAL TO POPLITEAL ARTERIAL BYPASS USING INSITU VEIN GRAFT;  Surgeon: José Luis Hernandez MD;  Location:  OR     C FABRIC WRAPPING OF ABDOMINAL ANEURYSM       CARDIAC CATHERIZATION  09/03/2009    multivessel CAD without target lesions, med mgmt indicated, preserved ef     CARPAL TUNNEL RELEASE RT/LT Right 05/20/2021     ENDARTERECTOMY CAROTID Right 05/11/2016    Procedure: ENDARTERECTOMY CAROTID;  Surgeon: Chadwick Lozano MD;  Location:  OR     ENDARTERECTOMY CAROTID Left 06/08/2020    Procedure: LEFT CAROTID ENDARTERECTOMY with distal facal vein patch  and EEG;  Surgeon: Chadwick Lozano MD;  Location:  OR     GYN SURGERY  left tube    left salpingectomy     IR LOWER EXTREMITY ANGIOGRAM RIGHT  05/25/2021     IR OR ANGIOGRAM  6/23/2021     LAPAROSCOPIC CHOLECYSTECTOMY N/A 09/27/2017    Procedure: LAPAROSCOPIC CHOLECYSTECTOMY;  LAPAROSCOPIC CHOLECYSTECTOMY;  Surgeon: Jacoby Tapia MD;  Location:  [facility]     ORTHOPEDIC SURGERY      left knee surgery     VASCULAR SURGERY  aoto bi fem  left fem-AK pop     [facility] NONSPECIFIC PROCEDURE  12/2000    angioplasty with stent right fem. a.     [facility] NONSPECIFIC PROCEDURE  1987    sinus surgery     [facility] NONSPECIFIC PROCEDURE  09/02/2009    Emergent left groin exploration with oversewing of bleeding angiographic site. 2. Endarterectomy of common femoral-proximal superficial femoral artery with greater saphenous vein patch angioplasty.   a. [place] of accessory right greater saphenous vein.      [facility] NONSPECIFIC PROCEDURE  08/27/2009    occluded left common iliac and external iliac arteries were successfully revascularized with stenting to 8 and 7 mm        Prior to Admission Medications   Prior to Admission Medications   Prescriptions Last Dose Informant Patient Reported? Taking?   Calcium  Carbonate-Vitamin D3 (CALCIUM 600-D) 600-400 MG-UNIT TABS 7/6/2021 at PM Self Yes Yes   Sig: Take 1 tablet by mouth every evening   acetaminophen (TYLENOL) 500 MG tablet 7/6/2021 at PRN Self Yes Yes   Sig: Take 500-1,000 mg by mouth every 6 hours as needed for mild pain   aspirin (ASA) 81 MG chewable tablet MORE THAN THREE WEEKS Self No Yes   Sig: Take 1 tablet (81 mg) by mouth daily   carvedilol (COREG) 6.25 MG tablet 7/6/2021 at AM Self Yes Yes   Sig: Take 6.25 mg by mouth 2 times daily (with meals)   clopidogrel (PLAVIX) 75 MG tablet 7/7/2021 at AM Self No Yes   Sig: Take 1 tablet (75 mg) by mouth daily   denosumab (PROLIA) 60 MG/ML SOLN injection More than a month at Unknown time Self No No   Sig: Inject 1 mL (60 mg) Subcutaneous every 6 months INDICATION: TO TREAT OSTEOPOROSIS   diphenhydrAMINE (BENADRYL) 25 MG tablet Past Month at PRN Self Yes Yes   Sig: Take 25-50 mg by mouth once as needed for itching or allergies (30 minutes to 2 hours before procedure using contrast)   fluticasone-vilanterol (BREO ELLIPTA) 200-25 MCG/INH inhaler 7/7/2021 at AM Self Yes Yes   Sig: Inhale 1 puff into the lungs daily   lisinopril (ZESTRIL) 20 MG tablet 7/5/2021 at AM Self Yes Yes   Sig: Take 20 mg by mouth every morning   methylPREDNISolone (MEDROL) 16 MG tablet  at PRN Self No Yes   Sig: Take 32 mg by mouth 12 hours before the procedure and repeat 32 mg by mouth 2 hours before the procedure to prevent contrast reaction.   nicotine (NICODERM CQ) 14 MG/24HR 24 hr patch 7/5/2021 Self No Yes   Sig: Place 1 patch onto the skin every 24 hours   nicotine (NICODERM CQ) 21 MG/24HR 24 hr patch 7/6/2021 at AM Self No Yes   Sig: Place 1 patch onto the skin every 24 hours   nicotine (NICODERM CQ) 7 MG/24HR 24 hr patch  Self No Yes   Sig: Place 1 patch onto the skin every 24 hours Start after completing 14 mg patches.   nitroGLYcerin (NITROSTAT) 0.4 MG sublingual tablet  Self No Yes   Sig: Place 1 tablet (0.4 mg) under the tongue See  Admin Instructions for chest pain   omeprazole (PRILOSEC) 20 MG DR capsule 7/6/2021 at AM Self Yes Yes   Sig: Take 20 mg by mouth every morning   oxyCODONE (ROXICODONE) 5 MG tablet   No No   Sig: Take 1 tablet (5 mg) by mouth every 6 hours as needed for pain   rosuvastatin (CRESTOR) 40 MG tablet 7/6/2021 at PM Self No Yes   Sig: Take 1 tablet (40 mg) by mouth At Bedtime      Facility-Administered Medications: None     Allergies   Allergies   Allergen Reactions     Contrast Dye Anaphylaxis     RASH, FACIAL AND NECK SWELLING, SOB, WHEEZING     Pantoprazole      Protonix caused diffuse edema     Chantix [Varenicline]      Terrible dreams     Penicillins Itching       Social History   Shirley Hendricks  reports that she has been smoking cigarettes. She started smoking about 62 years ago. She has a 52.00 pack-year smoking history. She has never used smokeless tobacco. She reports current alcohol use. She reports that she does not use drugs. She works as a .     Family History   Family History   Problem Relation Age of Onset     Cancer Mother         bladder     Cardiovascular Father         alive,multiple heart attacks     Diabetes Maternal Grandmother      Lung Cancer Maternal Grandmother      Blood Disease Brother         clotting disorder       Review of Systems   The 10 point Review of Systems is negative other than noted in the HPI or here.     Physical Exam   Temp: 98  F (36.7  C) Temp src: Oral BP: 99/49 Pulse: 54   Resp: 18 SpO2: 98 % O2 Device: None (Room air)    Vital Signs with Ranges  Temp:  [97.5  F (36.4  C)-98.3  F (36.8  C)] 98  F (36.7  C)  Pulse:  [52-60] 54  Resp:  [12-18] 18  BP: ()/(46-68) 99/49  SpO2:  [91 %-98 %] 98 %  106 lbs 0 oz    Constitutional: awake, alert, cooperative, no apparent distress, and appears stated age  Eyes: Lids and lashes normal, pupils equal, round and reactive to light, extra ocular muscles intact, sclera clear, conjunctiva normal  ENT: normocepalic,  without obvious abnormality, oropharynx pink and moist  Hematologic / Lymphatic: no lymphadenopathy  Respiratory: No increased work of breathing, good air exchange, clear to auscultation bilaterally, no crackles or wheezing  Cardiovascular: regular rate and rhythm, normal S1 and S2 and no murmur noted  GI: Normal bowel sounds, soft, non-distended, non-tender  Skin: no redness, warmth, or swelling, no rashes. Surgical sites are c/d/i.   Musculoskeletal: There is no redness, warmth, or swelling of the joints.  Full range of motion noted.  Motor strength is 5 out of 5 all extremities bilaterally.  Tone is normal. Right leg is warm.   Neurologic: Awake, alert, oriented to name, place and time.  Cranial nerves II-XII are grossly intact.  Motor is 5 out of 5 bilaterally.    Neuropsychiatric:  Normal affect, memory, insight.    Data   Most Recent 3 CBC's:  Recent Labs   Lab Test 07/09/21  0554 07/08/21  0647 06/21/21  1411   WBC 5.3 7.3 4.8   HGB 8.8* 8.8* 12.4   MCV 95 93 97    158 124*     Most Recent 3 BMP's:  Recent Labs   Lab Test 07/09/21  0554 07/08/21  0647 07/07/21  0624 06/21/21  1411 06/21/21  1411   * 128* 134   < > 130*   POTASSIUM 5.2 4.1 4.1   < > 4.6   CHLORIDE 103 101  --   --  100   CO2 27 21  --   --  30   BUN 13 13  --   --  13   CR 0.77 0.62  --   --  0.65   ANIONGAP 2* 6  --   --  <1*   SONIA 8.6 8.3*  --   --  8.7   GLC 98 86  --   --  78    < > = values in this interval not displayed.     Most Recent 3 INR's:  Recent Labs   Lab Test 09/24/20  0739 05/10/16  1208 04/21/16  1400   INR 1.01 0.95 1.02     Most Recent Cholesterol Panel:  Recent Labs   Lab Test 07/07/21  0624   CHOL 143   LDL 76   HDL 53   TRIG 71     Most Recent Hemoglobin A1c:  Recent Labs   Lab Test 09/23/20  0844   A1C 5.4

## 2021-07-10 NOTE — PLAN OF CARE
Review discharge instruction with patient. Questions answered. Patient discharged home with discharge instructions and belongings at 1145.

## 2021-07-11 ENCOUNTER — HEALTH MAINTENANCE LETTER (OUTPATIENT)
Age: 61
End: 2021-07-11

## 2021-07-12 ENCOUNTER — APPOINTMENT (OUTPATIENT)
Dept: ULTRASOUND IMAGING | Facility: CLINIC | Age: 61
End: 2021-07-12
Attending: EMERGENCY MEDICINE
Payer: COMMERCIAL

## 2021-07-12 ENCOUNTER — TELEPHONE (OUTPATIENT)
Dept: OTHER | Facility: CLINIC | Age: 61
End: 2021-07-12

## 2021-07-12 ENCOUNTER — HOSPITAL ENCOUNTER (EMERGENCY)
Facility: CLINIC | Age: 61
Discharge: HOME | End: 2021-07-12
Attending: EMERGENCY MEDICINE | Admitting: EMERGENCY MEDICINE
Payer: COMMERCIAL

## 2021-07-12 VITALS
TEMPERATURE: 97.8 F | HEART RATE: 64 BPM | DIASTOLIC BLOOD PRESSURE: 93 MMHG | RESPIRATION RATE: 18 BRPM | OXYGEN SATURATION: 98 % | SYSTOLIC BLOOD PRESSURE: 160 MMHG

## 2021-07-12 DIAGNOSIS — Z71.89 OTHER SPECIFIED COUNSELING: ICD-10-CM

## 2021-07-12 DIAGNOSIS — M79.89 LEG SWELLING: ICD-10-CM

## 2021-07-12 DIAGNOSIS — Z98.890 HISTORY OF PERIPHERAL ARTERY BYPASS: ICD-10-CM

## 2021-07-12 LAB
ALBUMIN SERPL-MCNC: 2.9 G/DL (ref 3.4–5)
ALP SERPL-CCNC: 72 U/L (ref 40–150)
ALT SERPL W P-5'-P-CCNC: 28 U/L (ref 0–50)
ANION GAP SERPL CALCULATED.3IONS-SCNC: 4 MMOL/L (ref 3–14)
AST SERPL W P-5'-P-CCNC: 38 U/L (ref 0–45)
BASOPHILS # BLD AUTO: 0.1 10E3/UL (ref 0–0.2)
BASOPHILS NFR BLD AUTO: 1 %
BILIRUB SERPL-MCNC: 0.4 MG/DL (ref 0.2–1.3)
BUN SERPL-MCNC: 12 MG/DL (ref 7–30)
CALCIUM SERPL-MCNC: 8.8 MG/DL (ref 8.5–10.1)
CHLORIDE BLD-SCNC: 100 MMOL/L (ref 94–109)
CO2 SERPL-SCNC: 28 MMOL/L (ref 20–32)
CREAT SERPL-MCNC: 0.64 MG/DL (ref 0.52–1.04)
EOSINOPHIL # BLD AUTO: 0.3 10E3/UL (ref 0–0.7)
EOSINOPHIL NFR BLD AUTO: 4 %
ERYTHROCYTE [DISTWIDTH] IN BLOOD BY AUTOMATED COUNT: 12.1 % (ref 10–15)
GFR SERPL CREATININE-BSD FRML MDRD: >90 ML/MIN/1.73M2
GLUCOSE BLD-MCNC: 102 MG/DL (ref 70–99)
HCT VFR BLD AUTO: 30.7 % (ref 35–47)
HGB BLD-MCNC: 10.3 G/DL (ref 11.7–15.7)
IMM GRANULOCYTES # BLD: 0 10E3/UL
IMM GRANULOCYTES NFR BLD: 0 %
INTERPRETATION ECG - MUSE: NORMAL
LACTATE SERPL-SCNC: 0.8 MMOL/L (ref 0.7–2)
LYMPHOCYTES # BLD AUTO: 0.8 10E3/UL (ref 0.8–5.3)
LYMPHOCYTES NFR BLD AUTO: 12 %
MCH RBC QN AUTO: 31.2 PG (ref 26.5–33)
MCHC RBC AUTO-ENTMCNC: 33.6 G/DL (ref 31.5–36.5)
MCV RBC AUTO: 93 FL (ref 78–100)
MONOCYTES # BLD AUTO: 0.6 10E3/UL (ref 0–1.3)
MONOCYTES NFR BLD AUTO: 9 %
NEUTROPHILS # BLD AUTO: 4.8 10E3/UL (ref 1.6–8.3)
NEUTROPHILS NFR BLD AUTO: 74 %
NRBC # BLD AUTO: 0 10E3/UL
NRBC BLD AUTO-RTO: 0 /100
PLATELET # BLD AUTO: 242 10E3/UL (ref 150–450)
POTASSIUM BLD-SCNC: 4.9 MMOL/L (ref 3.4–5.3)
PROT SERPL-MCNC: 7 G/DL (ref 6.8–8.8)
RBC # BLD AUTO: 3.3 10E6/UL (ref 3.8–5.2)
SODIUM SERPL-SCNC: 132 MMOL/L (ref 133–144)
WBC # BLD AUTO: 6.5 10E3/UL (ref 4–11)

## 2021-07-12 PROCEDURE — 93971 EXTREMITY STUDY: CPT | Mod: RT

## 2021-07-12 PROCEDURE — 83605 ASSAY OF LACTIC ACID: CPT | Performed by: EMERGENCY MEDICINE

## 2021-07-12 PROCEDURE — 99284 EMERGENCY DEPT VISIT MOD MDM: CPT | Mod: 25

## 2021-07-12 PROCEDURE — 85004 AUTOMATED DIFF WBC COUNT: CPT | Performed by: EMERGENCY MEDICINE

## 2021-07-12 PROCEDURE — 36415 COLL VENOUS BLD VENIPUNCTURE: CPT | Performed by: EMERGENCY MEDICINE

## 2021-07-12 PROCEDURE — 36592 COLLECT BLOOD FROM PICC: CPT | Performed by: EMERGENCY MEDICINE

## 2021-07-12 PROCEDURE — 80053 COMPREHEN METABOLIC PANEL: CPT | Performed by: EMERGENCY MEDICINE

## 2021-07-12 ASSESSMENT — ENCOUNTER SYMPTOMS
APPETITE CHANGE: 1
COLOR CHANGE: 1
WOUND: 1
FEVER: 0

## 2021-07-12 NOTE — PROGRESS NOTES
Brief Vascular Surgery Progress Note:    HPI:   61 yo F s/p R SFA to BK popliteal bypass using L upper arm cephalic vein on 7/6/2021. Routine post-operative course. Returned to the ED on 7/12/2021 with complaints of RLE swelling.     Objective:  2+ palpable R DP pulse; multiphasic R DP signal   Incisions healing appropriately     Increased swelling in the right lower extremity    Venous duplex negative for DVT      Assessment/Plan:   Explained to the her that increased swelling is normal after bypass due to increased blood flow. This swelling increases at home when patients are more mobile and not resting in bed.     Will have her follow-up in clinic at scheduled post-op appointment     Discussed with Dr. Manolo Beach MD  Vascular Surgery Fellow

## 2021-07-12 NOTE — TELEPHONE ENCOUNTER
"Called pt back, pt reports:    Right leg swelling increasingly worse.    Elevating, icing as much as possible.   Throbbing, tight, warm to touch, a little red.   Pain 6/10 and continuous.   Back of knee really hurts, very sore to the rub.     Tylenol throughout day, evening Oxycodone.     Pt is status post right above knee popliteal to bk popliteal artery bypass with left upper arm cephalic vein harvest, on 7/7/21 by Dr. Hernandez.   Are you having any issues with pain? See above.  Do you have any concerns about your incision? Leg incisions took bandage off today, \"look goods, oozing resolved\".   Arm incision clean/dry/intact.   Do you have fever, nausea or vomiting? no  Do you have any additional concerns? No   Eating very little, urination wnl.     Pt needs f/u OV with Dr. Hernandez, however pt encouraged to present to ER today with noted symptoms and concerns. Pt notes understanding.     ASHLEY Johnson, RN  [facility]  Office:  [phone] Fax: [phone]      "

## 2021-07-12 NOTE — TELEPHONE ENCOUNTER
[facility]    Who is the name of the provider?:  Mary      What is the location you see this provider at?: [place]    Reason for call:  Right leg bypass 7/7.  Concerned re severe swelling and pain.    Can we leave a detailed message on this number?  YES

## 2021-07-12 NOTE — ED PROVIDER NOTES
History     Chief Complaint:  Leg Swelling       The history is provided by the patient.      Shirley Hendricks is a 60 year old female with history of peripheral vascular disease, lupus, and popliteal vascular surgery who presents for evaluation of right lower leg. She began to have pain, heat and swelling on Saturday. It was swollen post-surgery but got more inflamed Saturday. She is complaining of decreased appetite and denied a fever. She called her vascular surgeons office, spoke to the nurse for a doctor she does not typically see and was sent to the ED based on her symptoms. She normally sees Dr. Lozano     Review of Systems   Constitutional: Positive for appetite change. Negative for fever.   Cardiovascular: Positive for leg swelling (Right).   Skin: Positive for color change (redness to lower right leg) and wound (surgical wounds lower extremity. See picture below).   All other systems reviewed and are negative.      Allergies:  Contrast Dye  Pantoprazole  Chantix  Penicillins    Medications:  Nicoderm  Medrol  Aspirin 81 mg   Nitroglycerin  Plavix  Crestor    Past Medical History:    Anxiety  Bilateral Carpal Tunnel  Charcot-Breonna-Tooth Disease  COPD  Discoid Lupus Erythematosus  Embolism and thrombosis of unspecified artery  GERD  Hyperlipidemia  Hypertension   Lupus  Depression  MI  Osteoarthrosis  Peripheral vascular Disease  SVT  Thrombocytopenia  Asthma  Vitamin C Deficient  Ischemia of extremity with history of revascularization of same extremity  Thrombocytopenia  Cervicalgia  Reflux Esophagitis     Past Surgical History:    Femoropopliteal bypass graft  Wrapping of abdominal aneurysm  Cardiac catheterization  Carpal tunnel release-bilateral  Salpingectomy  Endarterectomy carotid  Left Knee surgery  Cholecystectomy  Angioplasty  Sinus Surgery      Family History:    Mother: Bladder Cancer  Father: Cardiovascular  Brother: Clotting disorder    Social History:  The patient was accompanied to the  ED by herself.  Arrived by private vehicle.    Physical Exam     Patient Vitals for the past 24 hrs:   BP Temp Temp src Pulse Resp SpO2   07/12/21 1315 -- -- -- -- -- 99 %   07/12/21 1215 -- -- -- -- -- 99 %   07/12/21 1135 (!) 160/93 -- -- 63 -- 100 %   07/12/21 1050 (!) 166/69 97.8  F (36.6  C) Temporal 63 16 99 %       Physical Exam  General: Alert, No distress. Nontoxic appearance  Head: No signs of trauma.   Mouth/Throat: Oropharynx moist.   Eyes: Conjunctivae are normal. Pupils are equal..   Neck: Normal range of motion.    CV: Appears well perfused.  Resp:No respiratory distress.   MSK: Normal range of motion. Swelling, redness and tenderness to the lower right extremity.   Neuro: The patient is alert and interactive. MELENDREZ. Speech normal. GCS 15  Skin: No lesion or sign of trauma noted.   Psych: normal mood and affect. behavior is normal.                     Emergency Department Course     Imaging:    US Lower Extremity Venous Duplex Right   Final Result   IMPRESSION:    1.  No DVT demonstrated.   2.  No postoperative seroma or hematoma.   3.  Enlarged lymph nodes are present within both groins. These were   not identified on the previous ultrasound from 6/14/2021.      STEVAN RAYO MD            SYSTEM ID:  [id number]        Laboratory:    CBC: WBC 6.5, HGB 10.3 (L), PLT Pending  CMP: Pending    Lactic acid (result time 1322) 0.8    Emergency Department Course:    Reviewed:    1153 I reviewed the patient's nursing notes, vitals, past history and care everywhere    Assessments:    1215 I performed an exam of the patient as documented above.     Consults:     1228 I consulted with Dr. Beach, vascular surgery, about the patient.     Disposition:  Care of the patient was transferred to my colleague Dr. Braswell pending vascular consult.     Impression & Plan      Medical Decision Making:  Shirley Hendricks is a 60 year old female who presents for evaluation of leg swelling. A broad differential was  considered including Baker's cyst rupture, Baker's cyst, hematoma, rupture, compartment syndrome, muscle rupture, DVT, superficial thrombophlebitis, compression of the venous structures higher up in the abdomen and or leg cellulitis/ abscess, etc. Ultrasound is negative. There are no signs of compartment syndrome or other worrisome etiologies at this point so outpatient management is indicated.  I doubt more central causes of their swelling like renal failure, chf, liver disease, etc. Due to recent vascular surgery, vascular surgeons were consulted and recommended a venous US.  I signed the patient out to Dr Braswell pending the return call from vascular surgery after the US was resulted    Diagnosis:    ICD-10-CM    1. Leg swelling  M79.89    2. History of peripheral artery bypass  Z98.890        Scribe Disclosure:  ILiset, am serving as a scribe at 11:46 AM on 7/12/2021 to document services personally performed by Sina Dos Santos MD based on my observations and the provider's statements to me.     [facility] EMERGENCY DEPT         Sina Dos Santos MD  07/13/21 0041

## 2021-07-13 ENCOUNTER — TELEPHONE (OUTPATIENT)
Dept: OTHER | Facility: CLINIC | Age: 61
End: 2021-07-13

## 2021-07-13 NOTE — TELEPHONE ENCOUNTER
Routing to  to coordinate 1st PO appt 2 weeks from 7/7/21, in person OV, no imaging.      Appt: 1st PO 2 wk from s/p Above-knee popliteal to below-knee popliteal artery bypass using left upper extremity translocated cephalic vein on 7/7/21.     ASHLEY Johnson, RN  [facility]  Office:  [phone] Fax: [phone]

## 2021-07-15 NOTE — TELEPHONE ENCOUNTER
7/15/2021 LMTCB and schedule - 2nd and final attempt    Routing to  to coordinate 1st PO appt 2 weeks from 7/7/21, in person OV, no imaging.       Appt: 1st PO 2 wk from s/p Above-knee popliteal to below-knee popliteal artery bypass using left upper extremity translocated cephalic vein on 7/7/21.       Jaylin DOLAN

## 2021-07-18 DIAGNOSIS — K21.00 GASTROESOPHAGEAL REFLUX DISEASE WITH ESOPHAGITIS WITHOUT HEMORRHAGE: Primary | ICD-10-CM

## 2021-07-19 NOTE — TELEPHONE ENCOUNTER
Routing refill request to provider for review/approval because:  Failed protocol.   Urvashi Mackey RN  [facility]=

## 2021-07-22 DIAGNOSIS — E78.5 HYPERLIPIDEMIA LDL GOAL <70: ICD-10-CM

## 2021-07-25 RX ORDER — ROSUVASTATIN CALCIUM 40 MG/1
40 TABLET, COATED ORAL AT BEDTIME
Qty: 90 TABLET | Refills: 3 | Status: SHIPPED | OUTPATIENT
Start: 2021-07-25 | End: 2022-08-19

## 2021-07-26 ENCOUNTER — OFFICE VISIT (OUTPATIENT)
Dept: OTHER | Facility: CLINIC | Age: 61
End: 2021-07-26
Attending: SURGERY
Payer: COMMERCIAL

## 2021-07-26 VITALS — SYSTOLIC BLOOD PRESSURE: 122 MMHG | RESPIRATION RATE: 16 BRPM | HEART RATE: 62 BPM | DIASTOLIC BLOOD PRESSURE: 65 MMHG

## 2021-07-26 DIAGNOSIS — I70.229 CRITICAL ISCHEMIA OF EXTREMITY WITH HISTORY OF REVASCULARIZATION OF SAME EXTREMITY (H): Primary | ICD-10-CM

## 2021-07-26 DIAGNOSIS — Z98.890 CRITICAL ISCHEMIA OF EXTREMITY WITH HISTORY OF REVASCULARIZATION OF SAME EXTREMITY (H): Primary | ICD-10-CM

## 2021-07-26 PROCEDURE — 99024 POSTOP FOLLOW-UP VISIT: CPT | Performed by: SURGERY

## 2021-07-26 NOTE — PROGRESS NOTES
Shirley got a jump graft from the above-knee popliteal to below-knee popliteal artery using left upper extremity translocated cephalic vein on 7 July 2017.  He tells me that the rest pain with coldness and numbness in her foot has dissipated.    Her leg and arm incisions are healing well and sutures were removed.    She has a 2+ palpable right dorsalis pedis pulse and a biphasic peroneal signal.  Posterior tibial signal is not dopplerable.    Expected course of recovery after above operation.  We will see her back in 3 months with right lower extremity arterial duplex.  She will follow up with Dr. Lozano.

## 2021-07-26 NOTE — PROGRESS NOTES
[facility]        Patient is here for a follow up  to discuss about post op/left upper extremity translocated cephalic vein.       Patient's condition is stable.    /65 (BP Location: Right arm, Patient Position: Chair, Cuff Size: Adult Regular)   Pulse 62   Resp 16     The provider has been notified that the patient has no concerns.     Questions patient would like addressed today are: N/A.    Refills are needed: No    Has homecare services and agency name:  Isa Garibay [facility]

## 2021-07-27 DIAGNOSIS — Z98.890 CRITICAL ISCHEMIA OF EXTREMITY WITH HISTORY OF REVASCULARIZATION OF SAME EXTREMITY (H): Primary | ICD-10-CM

## 2021-07-27 DIAGNOSIS — I70.229 CRITICAL ISCHEMIA OF EXTREMITY WITH HISTORY OF REVASCULARIZATION OF SAME EXTREMITY (H): Primary | ICD-10-CM

## 2021-07-27 DIAGNOSIS — I73.9 PAD (PERIPHERAL ARTERY DISEASE) (H): ICD-10-CM

## 2021-08-03 ENCOUNTER — OFFICE VISIT (OUTPATIENT)
Dept: OTHER | Facility: CLINIC | Age: 61
End: 2021-08-03
Attending: SURGERY
Payer: COMMERCIAL

## 2021-08-03 ENCOUNTER — TELEPHONE (OUTPATIENT)
Dept: OTHER | Facility: CLINIC | Age: 61
End: 2021-08-03

## 2021-08-03 VITALS — HEART RATE: 57 BPM | SYSTOLIC BLOOD PRESSURE: 186 MMHG | DIASTOLIC BLOOD PRESSURE: 84 MMHG

## 2021-08-03 DIAGNOSIS — I73.9 PAD (PERIPHERAL ARTERY DISEASE) (H): Primary | ICD-10-CM

## 2021-08-03 DIAGNOSIS — T81.31XA POSTOPERATIVE WOUND DEHISCENCE, INITIAL ENCOUNTER: ICD-10-CM

## 2021-08-03 PROCEDURE — G0463 HOSPITAL OUTPT CLINIC VISIT: HCPCS

## 2021-08-03 PROCEDURE — 99213 OFFICE O/P EST LOW 20 MIN: CPT | Performed by: SURGERY

## 2021-08-03 NOTE — TELEPHONE ENCOUNTER
Pt reports after she left the clinic on 7/26/21 her upper incision on her right leg split open.  She reports it is approximately 1 to 1.5 inches by 0.5 inches in depth.    She reports there is drainage, changing the gauze every day.  No redness or odor.    Pt agreed to come into clinic today at 11:30am to see Dr. Lozano for wound check.    ESTHER PabonN, RN-[facility] [place]

## 2021-08-03 NOTE — PROGRESS NOTES
[facility]        Patient is here for a wound check follow up      Pt is currently taking Aspirin and Plavix.    Patient's condition is stable.    BP (!) 186/84 (BP Location: Right arm, Patient Position: Chair, Cuff Size: Adult Regular)   Pulse 57   Breastfeeding No     The provider has been notified that the patient has no concerns.     Questions patient would like addressed today are: N/A.    Refills are needed: No    Has homecare services and agency name:  Isa Al MA

## 2021-08-03 NOTE — PROGRESS NOTES
[place] [facility]    Shirley Hendricks has a history of significant PAD with aortobifemoral bypass graft and a well-functioning left femoral-popliteal graft.  She required a right femoral graft limb to above-knee popliteal cadaveric SFA bypass.  She developed a significant stenosis in the above-knee popliteal artery just distal to the anastomosis that was angioplastied but had a recurrent significant stenosis at the 4-week follow-up.  When I was unavailable my associate Dr Hernandez performed an outflow revision from the distal cadaveric SFA graft to below-knee popliteal artery using left arm cephalic vein.  Runoff is primarily via the anterior tibial artery.    Patient did well following the surgery.  Still complains of some tightness in her ankle.  The nylon sutures in the proximal calf and distal thigh removed on 7/26/2021 as were the sutures in the left arm cephalic site.    Patient noticed yesterday a partial dehiscence measuring 2 cm length of the midportion of the distal thigh incision with no cellulitis and a small amount of serous drainage.  Incision did well.  On her arm incision at the shoulder region she is noticed a fluctuant area that is mildly swollen and sore.    Request the patient come in for evaluation.    Exam: Alert and appropriate.  Blood pressure 186/84 (anxious) pulse 57              Good Doppler signal in the more proximal thigh portion of the right leg bypass graft and dorsalis pedis artery.  Also good Doppler signal in the left dorsalis pedis artery and graft.                Dehiscence of the midportion of the distal thigh incision measuring 2 cm in length.  Some serous drainage but no purulence.  No cellulitis.             Left shoulder area had a fluctuant area that was compressed and a small amount of purulence expressed.  Band-Aid applied.      PROCEDURE NOTE: Timeout was called and sites were identified in the right thigh.  Prepped with Betadine.  In the fluoroscopy  nylon sutures was placed proximally distally to the dehisced area to help prevent this from extending.  Gauze dressing was applied.  Patient tolerated well.      Impression:  Patent outflow revision of the right leg.  On chronic aspirin and Plavix will be continued.  Slight no dehiscence in the thigh with sutures proximally distally and will need to heal by secondary intention discussed with patient.  Suture removal in approximately 3 weeks.                        Small abscess in her left shoulder region that has been drained.  No need for systemic antibiotics.                       Patient recently diagnosed with having Charcot- Breonna-Tooth disease.  This has affected her hands and feet.  She was a  but had difficulty with her hands and sensation is subsequently retired.      Chadwick Lozano MD

## 2021-08-03 NOTE — TELEPHONE ENCOUNTER
Patient stated that upper incision split open when stitches were removed about a week ago in the clinic here. Patient is requesting for nurse to call.    Informed will send message to nurse to call back.       Jaylin DOLAN

## 2021-08-10 ENCOUNTER — APPOINTMENT (OUTPATIENT)
Dept: CT IMAGING | Facility: CLINIC | Age: 61
DRG: 335 | End: 2021-08-10
Attending: EMERGENCY MEDICINE
Payer: COMMERCIAL

## 2021-08-10 ENCOUNTER — NURSE TRIAGE (OUTPATIENT)
Dept: INTERNAL MEDICINE | Facility: CLINIC | Age: 61
End: 2021-08-10

## 2021-08-10 ENCOUNTER — HOSPITAL ENCOUNTER (INPATIENT)
Facility: CLINIC | Age: 61
LOS: 6 days | Discharge: HOME | DRG: 335 | End: 2021-08-16
Attending: EMERGENCY MEDICINE | Admitting: INTERNAL MEDICINE
Payer: COMMERCIAL

## 2021-08-10 DIAGNOSIS — I10 ESSENTIAL HYPERTENSION: ICD-10-CM

## 2021-08-10 DIAGNOSIS — K56.609 SBO (SMALL BOWEL OBSTRUCTION) (H): ICD-10-CM

## 2021-08-10 DIAGNOSIS — G89.18 POSTOPERATIVE PAIN: Primary | ICD-10-CM

## 2021-08-10 LAB
ALBUMIN SERPL-MCNC: 3.6 G/DL (ref 3.4–5)
ALP SERPL-CCNC: 70 U/L (ref 40–150)
ALT SERPL W P-5'-P-CCNC: 24 U/L (ref 0–50)
ANION GAP SERPL CALCULATED.3IONS-SCNC: 5 MMOL/L (ref 3–14)
AST SERPL W P-5'-P-CCNC: 19 U/L (ref 0–45)
BASOPHILS # BLD AUTO: 0.1 10E3/UL (ref 0–0.2)
BASOPHILS NFR BLD AUTO: 1 %
BILIRUB SERPL-MCNC: 0.5 MG/DL (ref 0.2–1.3)
BUN SERPL-MCNC: 12 MG/DL (ref 7–30)
CALCIUM SERPL-MCNC: 9.5 MG/DL (ref 8.5–10.1)
CHLORIDE BLD-SCNC: 100 MMOL/L (ref 94–109)
CO2 SERPL-SCNC: 26 MMOL/L (ref 20–32)
CREAT SERPL-MCNC: 0.62 MG/DL (ref 0.52–1.04)
CREAT SERPL-MCNC: 0.65 MG/DL (ref 0.52–1.04)
EOSINOPHIL # BLD AUTO: 0.1 10E3/UL (ref 0–0.7)
EOSINOPHIL NFR BLD AUTO: 2 %
ERYTHROCYTE [DISTWIDTH] IN BLOOD BY AUTOMATED COUNT: 12.9 % (ref 10–15)
GFR SERPL CREATININE-BSD FRML MDRD: >90 ML/MIN/1.73M2
GFR SERPL CREATININE-BSD FRML MDRD: >90 ML/MIN/1.73M2
GLUCOSE BLD-MCNC: 94 MG/DL (ref 70–99)
HCT VFR BLD AUTO: 36.8 % (ref 35–47)
HGB BLD-MCNC: 12.1 G/DL (ref 11.7–15.7)
IMM GRANULOCYTES # BLD: 0 10E3/UL
IMM GRANULOCYTES NFR BLD: 1 %
LACTATE SERPL-SCNC: 1.1 MMOL/L (ref 0.7–2)
LIPASE SERPL-CCNC: 132 U/L (ref 73–393)
LYMPHOCYTES # BLD AUTO: 1.1 10E3/UL (ref 0.8–5.3)
LYMPHOCYTES NFR BLD AUTO: 13 %
MCH RBC QN AUTO: 29.4 PG (ref 26.5–33)
MCHC RBC AUTO-ENTMCNC: 32.9 G/DL (ref 31.5–36.5)
MCV RBC AUTO: 89 FL (ref 78–100)
MONOCYTES # BLD AUTO: 0.5 10E3/UL (ref 0–1.3)
MONOCYTES NFR BLD AUTO: 6 %
NEUTROPHILS # BLD AUTO: 6.5 10E3/UL (ref 1.6–8.3)
NEUTROPHILS NFR BLD AUTO: 77 %
NRBC # BLD AUTO: 0 10E3/UL
NRBC BLD AUTO-RTO: 0 /100
PLATELET # BLD AUTO: 198 10E3/UL (ref 150–450)
POTASSIUM BLD-SCNC: 4.1 MMOL/L (ref 3.4–5.3)
PROT SERPL-MCNC: 7.7 G/DL (ref 6.8–8.8)
RBC # BLD AUTO: 4.12 10E6/UL (ref 3.8–5.2)
SARS-COV-2 RNA RESP QL NAA+PROBE: NEGATIVE
SODIUM SERPL-SCNC: 131 MMOL/L (ref 133–144)
WBC # BLD AUTO: 8.3 10E3/UL (ref 4–11)

## 2021-08-10 PROCEDURE — 85025 COMPLETE CBC W/AUTO DIFF WBC: CPT | Performed by: EMERGENCY MEDICINE

## 2021-08-10 PROCEDURE — 258N000003 HC RX IP 258 OP 636: Performed by: EMERGENCY MEDICINE

## 2021-08-10 PROCEDURE — 96375 TX/PRO/DX INJ NEW DRUG ADDON: CPT

## 2021-08-10 PROCEDURE — 82040 ASSAY OF SERUM ALBUMIN: CPT | Performed by: EMERGENCY MEDICINE

## 2021-08-10 PROCEDURE — 99222 1ST HOSP IP/OBS MODERATE 55: CPT | Mod: 57 | Performed by: SURGERY

## 2021-08-10 PROCEDURE — 83690 ASSAY OF LIPASE: CPT | Performed by: EMERGENCY MEDICINE

## 2021-08-10 PROCEDURE — 120N000001 HC R&B MED SURG/OB

## 2021-08-10 PROCEDURE — 36415 COLL VENOUS BLD VENIPUNCTURE: CPT | Performed by: EMERGENCY MEDICINE

## 2021-08-10 PROCEDURE — 99285 EMERGENCY DEPT VISIT HI MDM: CPT | Mod: 25

## 2021-08-10 PROCEDURE — 82565 ASSAY OF CREATININE: CPT | Performed by: NURSE PRACTITIONER

## 2021-08-10 PROCEDURE — 99223 1ST HOSP IP/OBS HIGH 75: CPT | Mod: AI | Performed by: NURSE PRACTITIONER

## 2021-08-10 PROCEDURE — 96361 HYDRATE IV INFUSION ADD-ON: CPT

## 2021-08-10 PROCEDURE — 83605 ASSAY OF LACTIC ACID: CPT | Performed by: EMERGENCY MEDICINE

## 2021-08-10 PROCEDURE — 96376 TX/PRO/DX INJ SAME DRUG ADON: CPT

## 2021-08-10 PROCEDURE — 87635 SARS-COV-2 COVID-19 AMP PRB: CPT | Performed by: EMERGENCY MEDICINE

## 2021-08-10 PROCEDURE — 74176 CT ABD & PELVIS W/O CONTRAST: CPT

## 2021-08-10 PROCEDURE — 258N000003 HC RX IP 258 OP 636: Performed by: NURSE PRACTITIONER

## 2021-08-10 PROCEDURE — 250N000009 HC RX 250: Performed by: PHYSICIAN ASSISTANT

## 2021-08-10 PROCEDURE — 36415 COLL VENOUS BLD VENIPUNCTURE: CPT | Performed by: NURSE PRACTITIONER

## 2021-08-10 PROCEDURE — C9803 HOPD COVID-19 SPEC COLLECT: HCPCS

## 2021-08-10 PROCEDURE — 96374 THER/PROPH/DIAG INJ IV PUSH: CPT

## 2021-08-10 PROCEDURE — 250N000013 HC RX MED GY IP 250 OP 250 PS 637: Performed by: NURSE PRACTITIONER

## 2021-08-10 PROCEDURE — 250N000011 HC RX IP 250 OP 636: Performed by: NURSE PRACTITIONER

## 2021-08-10 PROCEDURE — 250N000011 HC RX IP 250 OP 636: Performed by: EMERGENCY MEDICINE

## 2021-08-10 RX ORDER — PROCHLORPERAZINE 25 MG
25 SUPPOSITORY, RECTAL RECTAL EVERY 12 HOURS PRN
Status: DISCONTINUED | OUTPATIENT
Start: 2021-08-10 | End: 2021-08-14

## 2021-08-10 RX ORDER — NALOXONE HYDROCHLORIDE 0.4 MG/ML
0.4 INJECTION, SOLUTION INTRAMUSCULAR; INTRAVENOUS; SUBCUTANEOUS
Status: DISCONTINUED | OUTPATIENT
Start: 2021-08-10 | End: 2021-08-16 | Stop reason: HOSPADM

## 2021-08-10 RX ORDER — ACETAMINOPHEN 650 MG/1
650 SUPPOSITORY RECTAL EVERY 6 HOURS PRN
Status: DISCONTINUED | OUTPATIENT
Start: 2021-08-10 | End: 2021-08-12

## 2021-08-10 RX ORDER — PROCHLORPERAZINE MALEATE 10 MG
10 TABLET ORAL EVERY 6 HOURS PRN
Status: DISCONTINUED | OUTPATIENT
Start: 2021-08-10 | End: 2021-08-14

## 2021-08-10 RX ORDER — MORPHINE SULFATE 4 MG/ML
4 INJECTION, SOLUTION INTRAMUSCULAR; INTRAVENOUS
Status: DISCONTINUED | OUTPATIENT
Start: 2021-08-10 | End: 2021-08-10

## 2021-08-10 RX ORDER — ONDANSETRON 2 MG/ML
4 INJECTION INTRAMUSCULAR; INTRAVENOUS EVERY 30 MIN PRN
Status: DISCONTINUED | OUTPATIENT
Start: 2021-08-10 | End: 2021-08-10 | Stop reason: ALTCHOICE

## 2021-08-10 RX ORDER — ROSUVASTATIN CALCIUM 20 MG/1
40 TABLET, COATED ORAL AT BEDTIME
Status: DISCONTINUED | OUTPATIENT
Start: 2021-08-10 | End: 2021-08-16 | Stop reason: HOSPADM

## 2021-08-10 RX ORDER — NICOTINE 21 MG/24HR
1 PATCH, TRANSDERMAL 24 HOURS TRANSDERMAL DAILY
Status: DISCONTINUED | OUTPATIENT
Start: 2021-08-10 | End: 2021-08-16 | Stop reason: HOSPADM

## 2021-08-10 RX ORDER — ONDANSETRON 4 MG/1
4 TABLET, ORALLY DISINTEGRATING ORAL EVERY 6 HOURS PRN
Status: DISCONTINUED | OUTPATIENT
Start: 2021-08-10 | End: 2021-08-16 | Stop reason: HOSPADM

## 2021-08-10 RX ORDER — LIDOCAINE 40 MG/G
CREAM TOPICAL
Status: DISCONTINUED | OUTPATIENT
Start: 2021-08-10 | End: 2021-08-16 | Stop reason: HOSPADM

## 2021-08-10 RX ORDER — LIDOCAINE 40 MG/G
CREAM TOPICAL
Status: DISCONTINUED
Start: 2021-08-10 | End: 2021-08-10 | Stop reason: HOSPADM

## 2021-08-10 RX ORDER — HYDRALAZINE HYDROCHLORIDE 20 MG/ML
5-10 INJECTION INTRAMUSCULAR; INTRAVENOUS EVERY 4 HOURS PRN
Status: DISCONTINUED | OUTPATIENT
Start: 2021-08-10 | End: 2021-08-16 | Stop reason: HOSPADM

## 2021-08-10 RX ORDER — NALOXONE HYDROCHLORIDE 0.4 MG/ML
0.2 INJECTION, SOLUTION INTRAMUSCULAR; INTRAVENOUS; SUBCUTANEOUS
Status: DISCONTINUED | OUTPATIENT
Start: 2021-08-10 | End: 2021-08-16 | Stop reason: HOSPADM

## 2021-08-10 RX ORDER — HYDROMORPHONE HCL IN WATER/PF 6 MG/30 ML
0.2 PATIENT CONTROLLED ANALGESIA SYRINGE INTRAVENOUS
Status: DISCONTINUED | OUTPATIENT
Start: 2021-08-10 | End: 2021-08-11

## 2021-08-10 RX ORDER — SODIUM CHLORIDE 9 MG/ML
INJECTION, SOLUTION INTRAVENOUS CONTINUOUS
Status: DISCONTINUED | OUTPATIENT
Start: 2021-08-10 | End: 2021-08-13

## 2021-08-10 RX ORDER — CARVEDILOL 6.25 MG/1
6.25 TABLET ORAL 2 TIMES DAILY
Status: DISCONTINUED | OUTPATIENT
Start: 2021-08-10 | End: 2021-08-16 | Stop reason: HOSPADM

## 2021-08-10 RX ORDER — ACETAMINOPHEN 325 MG/1
650 TABLET ORAL EVERY 6 HOURS PRN
Status: DISCONTINUED | OUTPATIENT
Start: 2021-08-10 | End: 2021-08-12

## 2021-08-10 RX ORDER — ASPIRIN 81 MG/1
81 TABLET, CHEWABLE ORAL DAILY
Status: DISCONTINUED | OUTPATIENT
Start: 2021-08-10 | End: 2021-08-16 | Stop reason: HOSPADM

## 2021-08-10 RX ORDER — LABETALOL HYDROCHLORIDE 5 MG/ML
10 INJECTION, SOLUTION INTRAVENOUS
Status: DISCONTINUED | OUTPATIENT
Start: 2021-08-10 | End: 2021-08-16 | Stop reason: HOSPADM

## 2021-08-10 RX ORDER — SODIUM CHLORIDE, SODIUM LACTATE, POTASSIUM CHLORIDE, CALCIUM CHLORIDE 600; 310; 30; 20 MG/100ML; MG/100ML; MG/100ML; MG/100ML
INJECTION, SOLUTION INTRAVENOUS CONTINUOUS
Status: CANCELLED | OUTPATIENT
Start: 2021-08-10

## 2021-08-10 RX ORDER — CLOPIDOGREL BISULFATE 75 MG/1
75 TABLET ORAL DAILY
Status: DISCONTINUED | OUTPATIENT
Start: 2021-08-10 | End: 2021-08-16 | Stop reason: HOSPADM

## 2021-08-10 RX ORDER — ONDANSETRON 2 MG/ML
4 INJECTION INTRAMUSCULAR; INTRAVENOUS EVERY 6 HOURS PRN
Status: DISCONTINUED | OUTPATIENT
Start: 2021-08-10 | End: 2021-08-16 | Stop reason: HOSPADM

## 2021-08-10 RX ORDER — LISINOPRIL 10 MG/1
20 TABLET ORAL 2 TIMES DAILY
Status: DISCONTINUED | OUTPATIENT
Start: 2021-08-10 | End: 2021-08-16 | Stop reason: HOSPADM

## 2021-08-10 RX ORDER — SODIUM CHLORIDE 9 MG/ML
INJECTION, SOLUTION INTRAVENOUS ONCE
Status: COMPLETED | OUTPATIENT
Start: 2021-08-10 | End: 2021-08-10

## 2021-08-10 RX ADMIN — HYDROMORPHONE HYDROCHLORIDE 0.2 MG: 0.2 INJECTION, SOLUTION INTRAMUSCULAR; INTRAVENOUS; SUBCUTANEOUS at 20:41

## 2021-08-10 RX ADMIN — HYDRALAZINE HYDROCHLORIDE 10 MG: 20 INJECTION INTRAMUSCULAR; INTRAVENOUS at 20:40

## 2021-08-10 RX ADMIN — SODIUM CHLORIDE 1000 ML: 9 INJECTION, SOLUTION INTRAVENOUS at 08:35

## 2021-08-10 RX ADMIN — MORPHINE SULFATE 4 MG: 4 INJECTION, SOLUTION INTRAMUSCULAR; INTRAVENOUS at 11:07

## 2021-08-10 RX ADMIN — ONDANSETRON 4 MG: 2 INJECTION INTRAMUSCULAR; INTRAVENOUS at 20:40

## 2021-08-10 RX ADMIN — ONDANSETRON 4 MG: 2 INJECTION INTRAMUSCULAR; INTRAVENOUS at 08:44

## 2021-08-10 RX ADMIN — HYDROMORPHONE HYDROCHLORIDE 0.2 MG: 0.2 INJECTION, SOLUTION INTRAMUSCULAR; INTRAVENOUS; SUBCUTANEOUS at 16:24

## 2021-08-10 RX ADMIN — SODIUM CHLORIDE: 9 INJECTION, SOLUTION INTRAVENOUS at 14:25

## 2021-08-10 RX ADMIN — HYDROMORPHONE HYDROCHLORIDE 0.2 MG: 0.2 INJECTION, SOLUTION INTRAMUSCULAR; INTRAVENOUS; SUBCUTANEOUS at 18:29

## 2021-08-10 RX ADMIN — NICOTINE 1 PATCH: 14 PATCH, EXTENDED RELEASE TRANSDERMAL at 14:31

## 2021-08-10 RX ADMIN — MORPHINE SULFATE 4 MG: 4 INJECTION, SOLUTION INTRAMUSCULAR; INTRAVENOUS at 08:43

## 2021-08-10 RX ADMIN — FAMOTIDINE 20 MG: 10 INJECTION, SOLUTION INTRAVENOUS at 18:27

## 2021-08-10 RX ADMIN — ONDANSETRON 4 MG: 2 INJECTION INTRAMUSCULAR; INTRAVENOUS at 14:28

## 2021-08-10 RX ADMIN — PROCHLORPERAZINE EDISYLATE 10 MG: 5 INJECTION INTRAMUSCULAR; INTRAVENOUS at 18:25

## 2021-08-10 RX ADMIN — SODIUM CHLORIDE: 9 INJECTION, SOLUTION INTRAVENOUS at 11:08

## 2021-08-10 RX ADMIN — HYDROMORPHONE HYDROCHLORIDE 0.2 MG: 0.2 INJECTION, SOLUTION INTRAMUSCULAR; INTRAVENOUS; SUBCUTANEOUS at 14:23

## 2021-08-10 RX ADMIN — HYDRALAZINE HYDROCHLORIDE 10 MG: 20 INJECTION INTRAMUSCULAR; INTRAVENOUS at 16:22

## 2021-08-10 ASSESSMENT — ENCOUNTER SYMPTOMS
BACK PAIN: 0
ABDOMINAL DISTENTION: 1
FEVER: 0
NAUSEA: 1
FREQUENCY: 0
DIARRHEA: 0
DIFFICULTY URINATING: 0
VOMITING: 0
DYSURIA: 0
SHORTNESS OF BREATH: 0
CHILLS: 0

## 2021-08-10 ASSESSMENT — ACTIVITIES OF DAILY LIVING (ADL)
ADLS_ACUITY_SCORE: 11
ADLS_ACUITY_SCORE: 11

## 2021-08-10 ASSESSMENT — MIFFLIN-ST. JEOR
SCORE: 967.25
SCORE: 1007.67

## 2021-08-10 NOTE — CONSULTS
[facility] General Surgery Consultation    Shirley Hendricks MRN# [id number]   YOB: 1960 Age: 60 year old      Date of Admission:  8/10/2021  Date of Consult: 8/10/2021         Assessment and Plan:   Patient is a 60 year old female with small bowel obstruction    PLAN:  - Patient exam and labs are reassuring as well as clinical improvement since NG placement. No indication for emergent surgical intervention at this time.  - Will keep NPO and continue NG decompression. IV fluids and IV analgesia.  - Encourage ambulation QID.  - Patient has history of anaphylaxis to contrast dye 20 years ago, did discuss case with pharmacy and radiology regarding premedication if gastrografin contrast challenge is needed tomorrow  - General Surgery will continue to follow          Requesting Physician:      NOELLE Ferro CNP        Chief Complaint:     Chief Complaint   Patient presents with     Abdominal Pain          History of Present Illness:   Shirley Hendricks is a 60 year old female who was seen in consultation at the request of Becky Purdy CNP. Patient presented to the ED this morning for abdominal pain. Patient states she ate a [employer] fish sandwich yesterday night and woke up this morning with sudden diffuse abdominal pain. This pain came and went in waves but never fully went away. The pain was throughout her entire abdomen but worse centrally. She has never had pain like this in the past. She did have some nausea this morning and spit up some phlegm, but denies vomiting. Patient had a normal BM yesterday morning and denies changes in bowel movements. Labs remarkable for normal white count, mild hyponatremia (131), and normal lactic acid. CT visualized multiple dilated fluid-filled loops of mid small bowel in the lower abdomen suspicious for closed loop small bowel obstruction. There was also enlarged right common iliac chain lymph node and multiple enlarged bilateral  "inguinal lymph nodes increased in size from previous imaging following right lower extremity above-knee popliteal to below -nee popliteal artery bypass using left upper extremity translocated cephalic vein on 7/7/21.     Shirley Hendricks has surgical history of cholecystectomy, left salpingectomy, and aortobifemoral bypass graft. Patient has chronic medical conditions including peripheral vascular disease, hyperlipidemia, hypertension, COPD, asthma, and history of MI x3. She quit smoking tobacco last week, has a 52-pack-year history. Patient does have history of anaphylaxis to contrast dye but has tolerated IV contrast with premedication with solumedrol and benadryl. She states her mother had \"bowel problems\" and ended up with a colostomy later in life.          Physical Exam:   Blood pressure (!) 157/76, pulse 62, temperature 97.4  F (36.3  C), temperature source Oral, resp. rate 18, height 1.575 m (5' 2\"), weight 49.4 kg (109 lb), SpO2 92 %, not currently breastfeeding.  109 lbs 0 oz  General: Vital signs reviewed, in no apparent distress  Eyes: Anicteric  HENT: Normocephalic, atraumatic, trachea midline   Respiratory: Breathing nonlabored  Cardiovascular: Regular rate and rhythm  GI: Abdomen minimally distended but soft, tender on right abdomen without guarding, bowel sounds present, midline aortobifemoral bypass graft scar    Musculoskeletal: No gross deformities  Neurologic: Grossly nonfocal exam  Psychiatric: Normal mood, affect and insight  Integumentary: Warm and dry         Past Medical History:     Past Medical History:   Diagnosis Date     Anxiety 12/07/2017     Bilateral carpal tunnel syndrome      Charcot-Breonna-Tooth disease      COPD (chronic obstructive pulmonary disease) (H)      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 Simons dermatology     Embolism and thrombosis of unspecified artery (H) 08/2000    Protein C,S, Leiden FV, Lupus Inhibitor Negative     " Gastroesophageal reflux disease      Hyperlipidaemia      Hypertension      Lupus (H)     skin     Mild major depression (H) 11/07/2017     Myocardial infarction (H)     x3     Osteoarthrosis, unspecified whether generalized or localized, unspecified site      PAD (peripheral artery disease) (H)      Peripheral vascular disease, unspecified (H) 12/2000    s/p angioplasty with stent right femoral a.; Right iliac and femoral a. clot     Post-menopausal      Reflux esophagitis 02/2004    EGD: esophagitis and medium HH     SVT (supraventricular tachycardia) (H)      Thrombocytopenia (H)      Uncomplicated asthma      Vitamin C deficiency 08/12/2018            Past Surgical History:     Past Surgical History:   Procedure Laterality Date     ANGIOGRAM       ANGIOGRAM Right 6/23/2021    Procedure: RIGHT LOWER EXTREMITY ANGIOGRAM WITH LEFT BRACHIAL ARTERY CUTDOWN;  Surgeon: José Luis Hernandez MD;  Location:  OR     BYPASS GRAFT FEMOROPOPLITEAL Right 09/23/2020    Procedure: RIGHT FEMORAL GRAFT TO ABOVE-KNEE POPLITEAL BYPASS USING CADAVERIC SUPERFICIAL FEMORAL ARTERY;  Surgeon: Chadwick Lozano MD;  Location:  OR     BYPASS GRAFT INSITU FEMOROPOPLITEAL Right 7/7/2021    Procedure: CREATION RIGHT FEMORAL TO POPLITEAL ARTERIAL BYPASS USING INSITU VEIN GRAFT;  Surgeon: José Luis Hernandez MD;  Location:  OR     C FABRIC WRAPPING OF ABDOMINAL ANEURYSM       CARDIAC CATHERIZATION  09/03/2009    multivessel CAD without target lesions, med mgmt indicated, preserved ef     CARPAL TUNNEL RELEASE RT/LT Right 05/20/2021     ENDARTERECTOMY CAROTID Right 05/11/2016    Procedure: ENDARTERECTOMY CAROTID;  Surgeon: Chadwick Lozano MD;  Location:  OR     ENDARTERECTOMY CAROTID Left 06/08/2020    Procedure: LEFT CAROTID ENDARTERECTOMY with distal facal vein patch  and EEG;  Surgeon: Chadwick Lozano MD;  Location:  OR     GYN SURGERY  left tube    left salpingectomy     IR LOWER EXTREMITY ANGIOGRAM RIGHT   05/25/2021     IR OR ANGIOGRAM  6/23/2021     LAPAROSCOPIC CHOLECYSTECTOMY N/A 09/27/2017    Procedure: LAPAROSCOPIC CHOLECYSTECTOMY;  LAPAROSCOPIC CHOLECYSTECTOMY;  Surgeon: Jacoby Tapia MD;  Location: [facility]     ORTHOPEDIC SURGERY      left knee surgery     VASCULAR SURGERY  aoto bi fem  left fem-AK pop     [facility] NONSPECIFIC PROCEDURE  12/2000    angioplasty with stent right fem. a.     [facility] NONSPECIFIC PROCEDURE  1987    sinus surgery     [facility] NONSPECIFIC PROCEDURE  09/02/2009    Emergent left groin exploration with oversewing of bleeding angiographic site. 2. Endarterectomy of common femoral-proximal superficial femoral artery with greater saphenous vein patch angioplasty.   a. [place] of accessory right greater saphenous vein.      [facility] NONSPECIFIC PROCEDURE  08/27/2009    occluded left common iliac and external iliac arteries were successfully revascularized with stenting to 8 and 7 mm             Current Medications:           aspirin  81 mg Oral Daily     carvedilol  6.25 mg Oral BID     clopidogrel  75 mg Oral Daily     lidocaine         lisinopril  20 mg Oral BID     rosuvastatin  40 mg Oral At Bedtime       morphine, ondansetron         Home Medications:     Prior to Admission medications    Medication Sig Last Dose Taking? Auth Provider   acetaminophen (TYLENOL) 500 MG tablet Take 500-1,000 mg by mouth every 6 hours as needed for mild pain prn Yes Reported, Patient   Calcium Carbonate-Vitamin D3 (CALCIUM 600-D) 600-400 MG-UNIT TABS Take 1 tablet by mouth every evening Past Week at Unknown time Yes Reported, Patient   carvedilol (COREG) 6.25 MG tablet Take 6.25 mg by mouth 2 times daily (with meals) 8/9/2021 at Unknown time Yes Reported, Patient   clopidogrel (PLAVIX) 75 MG tablet Take 1 tablet (75 mg) by mouth daily 8/9/2021 at Unknown time Yes Vasquez Benoit MD   denosumab (PROLIA) 60 MG/ML SOLN injection Inject 1 mL (60 mg) Subcutaneous every 6 months INDICATION: TO TREAT OSTEOPOROSIS  Yes Kaycee  Vasquez WANG MD   fluticasone-vilanterol (BREO ELLIPTA) 200-25 MCG/INH inhaler Inhale 1 puff into the lungs daily 8/9/2021 at Unknown time Yes Reported, Patient   lisinopril (ZESTRIL) 20 MG tablet Take 20 mg by mouth 2 times daily  8/9/2021 at Unknown time Yes Reported, Patient   nicotine (NICODERM CQ) 21 MG/24HR 24 hr patch Place 1 patch onto the skin every 24 hours Not on, but pt requests Yes Vasquez Benoit MD   nitroGLYcerin (NITROSTAT) 0.4 MG sublingual tablet Place 1 tablet (0.4 mg) under the tongue See Admin Instructions for chest pain prn Yes Vasquez Benoit MD   omeprazole (PRILOSEC) 20 MG DR capsule TAKE ONE CAPSULE BY MOUTH DAILY 8/9/2021 at Unknown time Yes Vasquez Benoit MD   rosuvastatin (CRESTOR) 40 MG tablet Take 1 tablet (40 mg) by mouth At Bedtime 8/9/2021 at Unknown time Yes Vasquez Benoit MD   aspirin (ASA) 81 MG chewable tablet Take 1 tablet (81 mg) by mouth daily   Chadwick Lozano   nicotine (NICODERM CQ) 14 MG/24HR 24 hr patch Place 1 patch onto the skin every 24 hours   Ailin Nichols PA-C   nicotine (NICODERM CQ) 7 MG/24HR 24 hr patch Place 1 patch onto the skin every 24 hours Start after completing 14 mg patches.   Ailin Nichols PA-C            Allergies:     Allergies   Allergen Reactions     Contrast Dye Anaphylaxis     RASH, FACIAL AND NECK SWELLING, SOB, WHEEZING     Pantoprazole      Protonix caused diffuse edema     Chantix [Varenicline]      Terrible dreams     Penicillins Itching            Family History:     Family History   Problem Relation Age of Onset     Cancer Mother         bladder     Cardiovascular Father         alive,multiple heart attacks     Diabetes Maternal Grandmother      Lung Cancer Maternal Grandmother      Blood Disease Brother         clotting disorder           Social History:   Shirley Hendricks  reports that she quit smoking 7 days ago. Her smoking use included cigarettes. She has a 52.00 pack-year smoking history. She has never used smokeless  tobacco. She reports previous alcohol use. She reports that she does not use drugs.          Review of Systems:   The 12 point Review of Systems is negative other than noted in the HPI.         Labs/Imaging   All new lab and imaging data was reviewed.   Recent Labs   Lab 08/10/21  0833   WBC 8.3   HGB 12.1   HCT 36.8   MCV 89        Recent Labs   Lab 08/10/21  0833   *   POTASSIUM 4.1   CHLORIDE 100   CO2 26   ANIONGAP 5   GLC 94   BUN 12   CR 0.65   GFRESTIMATED >90   SONIA 9.5   PROTTOTAL 7.7   ALBUMIN 3.6   BILITOTAL 0.5   ALKPHOS 70   AST 19   ALT 24       I have personally reviewed the imaging studies-   CT ABD PELVIS WITHOUT CONTRAST  IMPRESSION:   1.  Multiple dilated fluid-filled loops of mid small bowel in the  lower abdomen have an appearance suspicious for a closed loop small  bowel obstruction.  2.  An enlarged right common iliac chain lymph node and multiple  enlarged bilateral inguinal lymph nodes have increased in size, and  are indeterminate.  3.  Indeterminate 0.4 cm right lower lobe pulmonary nodule is new  since the previous exam. Please refer to pulmonary nodule follow-up  guidelines below.      Krissy Flower PA-C

## 2021-08-10 NOTE — PROGRESS NOTES
RECEIVING UNIT ED HANDOFF REVIEW    ED Nurse Handoff Report was reviewed by: Reyna Hurley RN on August 10, 2021 at 1:34 PM

## 2021-08-10 NOTE — PLAN OF CARE
Admission    Patient arrives to room 633-1 via cart from ED.  Care plan note:   DATE & TIME: 2149-1910 8/10/21  Cognitive Concerns/ Orientation : A&Ox4   BEHAVIOR & AGGRESSION TOOL COLOR: green  CIWA SCORE: NA   ABNL VS/O2: BP elevated 213/97, has scheduled BP medications (lisinopril and Carvedilol) once nausea resolves, prn not given at this time, christel HR 58, other VSS, O2 94% RA, pt reported her O2 tends to drop when sleeping.   MOBILITY: SBA, steady  PAIN MANAGMENT: c/o upper abd pain 10/10, received prn IV Dilaudid 0.2 mg x1.   DIET: NPO   BOWEL/BLADDER: continent. Denies urinary symptoms, last BM last night per pt.   ABNL LAB/BG: Na 131  DRAIN/DEVICES: PIV infusing NS 50 mL/hr, NG tube on LIS   TELEMETRY RHYTHM: NA  SKIN: recent above-the-knee popliteal to below the knee popliteal artery bypass surgery, dressing in place, WOC and Vascular medicine following.   TESTS/PROCEDURES: none  D/C DATE: pending progress  OTHER IMPORTANT INFO: Emesis x1, received prn IV Zofran x1. General surgery following. Nicotine patch on Rt arm.   MD/RN ROUNDING SIGNED OFF D/E SHIFT: NA  COMMIT TO SIT DONE AND SIGNED OFF Yes

## 2021-08-10 NOTE — PHARMACY-ADMISSION MEDICATION HISTORY
Pharmacy Medication History  Admission medication history interview status for the 8/10/2021  admission is complete. See EPIC admission navigator for prior to admission medications     Location of Interview: Patient room  Medication history sources: Patient and Surescripts    Significant changes made to the medication list:  Lisinopril increased to 20 mg twice daily    In the past week, patient estimated taking medication this percent of the time: greater than 90%    Medication reconciliation completed by provider prior to medication history? No    Time spent in this activity: 15 minutes    Prior to Admission medications    Medication Sig Last Dose Taking? Auth Provider   acetaminophen (TYLENOL) 500 MG tablet Take 500-1,000 mg by mouth every 6 hours as needed for mild pain prn Yes Reported, Patient   Calcium Carbonate-Vitamin D3 (CALCIUM 600-D) 600-400 MG-UNIT TABS Take 1 tablet by mouth every evening Past Week at Unknown time Yes Reported, Patient   carvedilol (COREG) 6.25 MG tablet Take 6.25 mg by mouth 2 times daily (with meals) 8/9/2021 at Unknown time Yes Reported, Patient   clopidogrel (PLAVIX) 75 MG tablet Take 1 tablet (75 mg) by mouth daily 8/9/2021 at Unknown time Yes Vasquez Benoit MD   denosumab (PROLIA) 60 MG/ML SOLN injection Inject 1 mL (60 mg) Subcutaneous every 6 months INDICATION: TO TREAT OSTEOPOROSIS  Yes Vasquez Benoit MD   fluticasone-vilanterol (BREO ELLIPTA) 200-25 MCG/INH inhaler Inhale 1 puff into the lungs daily 8/9/2021 at Unknown time Yes Reported, Patient   lisinopril (ZESTRIL) 20 MG tablet Take 20 mg by mouth 2 times daily  8/9/2021 at Unknown time Yes Reported, Patient   nicotine (NICODERM CQ) 21 MG/24HR 24 hr patch Place 1 patch onto the skin every 24 hours Not on, but pt requests Yes Vasquez Benoit MD   nitroGLYcerin (NITROSTAT) 0.4 MG sublingual tablet Place 1 tablet (0.4 mg) under the tongue See Admin Instructions for chest pain prn Yes Vasquez Benoit MD   omeprazole (PRILOSEC) 20 MG  DR capsule TAKE ONE CAPSULE BY MOUTH DAILY 8/9/2021 at Unknown time Yes Vasquez Benoit MD   rosuvastatin (CRESTOR) 40 MG tablet Take 1 tablet (40 mg) by mouth At Bedtime 8/9/2021 at Unknown time Yes Vasquez Benoit MD   aspirin (ASA) 81 MG chewable tablet Take 1 tablet (81 mg) by mouth daily   Chadwick Lozano   nicotine (NICODERM CQ) 14 MG/24HR 24 hr patch Place 1 patch onto the skin every 24 hours   Ailin Nichols PA-C   nicotine (NICODERM CQ) 7 MG/24HR 24 hr patch Place 1 patch onto the skin every 24 hours Start after completing 14 mg patches.   Ailin Nichols PA-C       The information provided in this note is only as accurate as the sources available at the time of update(s)

## 2021-08-10 NOTE — ED TRIAGE NOTES
Pt woke up this morning and umbilical pain started right away. Passing gas, last BM yesterday. Ate fish sandwich from [employer] last night. Nausea.

## 2021-08-10 NOTE — H&P
[facility]    History and Physical - Hospitalist Service       Date of Admission:  8/10/2021    Assessment & Plan      Shirley Hendricks is a 60 year old female with past medical history of  hypertension, hyperlipidemia, COPD, CAD, SVT asymptomatic, PAD status post recent surgical revascularization of the right lower extremity in July 2021 for critical ischemia in extremity with history of revascularization, cholecystectomy, salpingectomy  admitted on 8/10/2021 with abdominal pain and small bowel obstruction.    Small bowel obstruction with CT scan showing multiple dilated fluid-filled loops of mid small bowel in the lower abdomen have an appearance suspicious for a closed loop small bowel obstruction, an enlarged right common iliac chain lymph node and multiple enlarged bilateral inguinal lymph nodes have increased in size, and are indeterminate  -NG tube decompression  -General surgery consultation in case operative management is required  -N.p.o. except for medications  -IV fluids for insensible losses  -Antiemetics and analgesia  -Encourage ambulation  --Check urinalysis  -Revised cardiac risk index score is is at least 2 which portends a 10% risk of MACE in the next 30 days.     Possible protein calorie malnutrition with unintentional 26 pound weight loss in the last 8 months and following recent vascular surgery and poor wound healing of the right lower extremity  -Consult dietitian    Hypertension at times in urgency ranges 220/98 at home, PTA regimen includes lisinopril 20 mg bid and Coreg 6.25 mg twice daily.  Was also on  amlodipine 5mg bid from 4/8/21-7/7/21, seems to have been stopped sometime in between  Hyperlipidemia  CAD diffuse, moderate, nonobstructive by 2009 coronary angiogram  -We will continue PTA lisinopril and coreg with hold parameters  -if BPs consistently running high can resume amlodipine  -As needed labetalol and hydralazine for SBP sustained greater than 180  mmHg  -Resume PTA Crestor  -Dual antiplatelet therapy as noted elsewhere    Hyponatremia, mild, asymptomatic, will check prior levels to see where her baseline  -NS IV fluids for insensible losses while n.p.o.    PAD status post above-the-knee popliteal to below the knee popliteal artery bypass using left upper extremity translocated cephalic vein for critical limb ischemia of extremity with history of revascularization on 7/6/2021 with [place] vascular surgery. She has been on dual antiplatelet therapy since then. I refer the interested reader to COLE Nichols consultation on on 7/7/2021 for additional details of her PAD.  -We will continue her dual antiplatelet therapy for now and consult vascular medicine regarding anticipated need of possible holding if she requires a surgical intervention  -WOCN consult    Likely COPD severity unknown, last seen at [facility] for lung science in 2014 for lung cancer screening but not felt to actually have COPD at that time. Although she has continued to smoke since then. PFTs prior to that were inconclusive and she has had known mediastinal and axillary lymphadenopathy  Tobacco use attempts to quit have thus far been unsuccessful but she is ready to try again   Pulmonary nodule, indeterminate 0.4 cm right lower lobe pulmonary nodule is new  since the previous exam. Given patient's emotional lability we did not specifically discuss this  --Outpatient follow-up in 12 months to reevaluate pulmonary nodule with [facility] pulmonology  --Continue PTA inhalers    Covid-19  -PCR is negative on 8/10/2021, vaccination status not discussed     history of SVT asymptomatic, seen on zio patch as work up for lightheadedness  --low threshold to start telemetry if any symptoms       Diet:  N.p.o.  DVT Prophylaxis: Pneumatic Compression Devices and DAPT  Alvarez Catheter: Not present  Central Lines: None  Code Status:  Warrants further discussion but again patient was quite emotional during the  interview I did not push it    Disposition Plan   Expected discharge: 3-5 days recommended to prior living arrangement once adequate pain management/ tolerating PO medications     The patient's care was discussed with the Attending Physician, Dr. Tian Mac and Bedside Nurse.    Total time 50  Of which 20 was face to face time remainder spent in counseling, coordination of care and chart review    NOELLE Garcia [facility]  Securely message with the Vocera Web Console (learn more here)  Text page via [employer] Paging/Directory      ______________________________________________________________________    Chief Complaint   Stomach problems    History is obtained from the patient and EMR    History of Present Illness   Shirley Hendricks is a 60 year old female with past medical history of  hypertension, hyperlipidemia, possible COPD severity unspecified, CAD diffuse, moderate, nonobstructive, SVT, PAD status post recent above-the-knee popliteal to below the knee popliteal artery bypass using left upper extremity translocated cephalic vein for critical limb ischemia of extremity with history of revascularization on 7/6/2021lower extremity bypass in July 2021, cholecystectomy, salpingectomy who had sudden onset of abdominal pain at 6 AM on the morning of 8/10/2021 which woke her from sleep.  She describes as 10/10 in severity no prior similar episodes.  It was sharp shooting stabbing aching she did not try any relieving factors at home.  She was occasionally helped by position changes.  She called clinic provider who referred her to the emergency department.  She describes the pain as initially being supraumbilical and then radiating to bilateral sides.  She vomited once on arrival to the emergency department, does not know if it is bloody or not.  Has been nauseated at home prior to coming to the ED.  She had a BM on the a.m. of 8/9/2021 passed flatus on the a.m. of admission.  As  she thinks that her last meal at [employer] on the evening of 8/9/2021 may be contributing.  She endorses chills but denies any chest pain shortness of breath cough.  She endorses 26 pound unintentional weight loss since January of this year.  There is no dysuria.  She denies any syncopal episodes but reports she does get lightheaded.  She checks her blood pressure every morning at home before taking her antihypertensives.  At times she will be as high as 220/98 before her meds.  She will hold them if she is having systolic pressures in the 100 range.  She has not had any in hypertensives this morning.  Although she is quite uncomfortable and chronically ill-appearing she also seems a bit overwhelmed by my questions regarding CODE STATUS and is slightly tearful throughout the interview.    In the emergency department patient underwent CT imaging of the abdomen and pelvis which showed Multiple dilated fluid-filled loops of mid small bowel in the lower abdomen have an appearance suspicious for a closed loop small  bowel obstruction, an enlarged right common iliac chain lymph node and multiple enlarged bilateral inguinal lymph nodes have increased in size, and  are indeterminate, indeterminate 0.4 cm right lower lobe pulmonary nodule is new  since the previous exam.  General surgery was contacted who recommended NG tube decompression, hospitalist admission and general surgery consultation.  She was given 1 L normal saline fluid bolus, Zofran, 4 mg IV morphine which has not yet seemed very effective in providing analgesia.  Hospitalist service will admit the patient for small bowel obstruction management.    Review of Systems    The 10 point Review of Systems is negative other than noted in the HPI or here.     Past Medical History    I have reviewed this patient's medical history and updated it with pertinent information if needed.   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 Simons dermatology     Embolism and thrombosis of unspecified artery (H) 08/2000    Protein C,S, Leiden FV, Lupus Inhibitor Negative     Gastroesophageal reflux disease      Hyperlipidaemia      Hypertension      Lupus (H)     skin     Mild major depression (H) 11/07/2017     Myocardial infarction (H)     x3     Osteoarthrosis, unspecified whether generalized or localized, unspecified site      PAD (peripheral artery disease) (H)      Peripheral vascular disease, unspecified (H) 12/2000    s/p angioplasty with stent right femoral a.; Right iliac and femoral a. clot     Post-menopausal      Reflux esophagitis 02/2004    EGD: esophagitis and medium HH     SVT (supraventricular tachycardia) (H)      Thrombocytopenia (H)      Uncomplicated asthma      Vitamin C deficiency 08/12/2018       Past Surgical History   I have reviewed this patient's surgical history and updated it with pertinent information if needed.  Past Surgical History:   Procedure Laterality Date     ANGIOGRAM       ANGIOGRAM Right 6/23/2021    Procedure: RIGHT LOWER EXTREMITY ANGIOGRAM WITH LEFT BRACHIAL ARTERY CUTDOWN;  Surgeon: José Luis Hernandez MD;  Location: [facility] OR     BYPASS GRAFT FEMOROPOPLITEAL Right 09/23/2020    Procedure: RIGHT FEMORAL GRAFT TO ABOVE-KNEE POPLITEAL BYPASS USING CADAVERIC SUPERFICIAL FEMORAL ARTERY;  Surgeon: Chadwick Lozano MD;  Location: [facility] OR     BYPASS GRAFT INSITU FEMOROPOPLITEAL Right 7/7/2021    Procedure: CREATION RIGHT FEMORAL TO POPLITEAL ARTERIAL BYPASS USING INSITU VEIN GRAFT;  Surgeon: José Luis Hernandez MD;  Location:  OR     C FABRIC WRAPPING OF ABDOMINAL ANEURYSM       CARDIAC CATHERIZATION  09/03/2009    multivessel CAD without target lesions, med mgmt indicated, preserved ef     CARPAL TUNNEL RELEASE RT/LT  Right 2021     ENDARTERECTOMY CAROTID Right 2016    Procedure: ENDARTERECTOMY CAROTID;  Surgeon: Chadwick Lozano MD;  Location:  OR     ENDARTERECTOMY CAROTID Left 2020    Procedure: LEFT CAROTID ENDARTERECTOMY with distal facal vein patch  and EEG;  Surgeon: Chadwick Lozano MD;  Location:  OR     GYN SURGERY  left tube    left salpingectomy     IR LOWER EXTREMITY ANGIOGRAM RIGHT  2021     IR OR ANGIOGRAM  2021     LAPAROSCOPIC CHOLECYSTECTOMY N/A 2017    Procedure: LAPAROSCOPIC CHOLECYSTECTOMY;  LAPAROSCOPIC CHOLECYSTECTOMY;  Surgeon: Jacoby Tapia MD;  Location:  [facility]     ORTHOPEDIC SURGERY      left knee surgery     VASCULAR SURGERY  aoto bi fem  left fem-AK pop     [facility] NONSPECIFIC PROCEDURE  2000    angioplasty with stent right fem. a.     [facility] NONSPECIFIC PROCEDURE      sinus surgery     [facility] NONSPECIFIC PROCEDURE  2009    Emergent left groin exploration with oversewing of bleeding angiographic site. 2. Endarterectomy of common femoral-proximal superficial femoral artery with greater saphenous vein patch angioplasty.   a. [place] of accessory right greater saphenous vein.      [facility] NONSPECIFIC PROCEDURE  2009    occluded left common iliac and external iliac arteries were successfully revascularized with stenting to 8 and 7 mm        Social History   I have reviewed this patient's social history and updated it with pertinent information if needed.  Social History     Tobacco Use     Smoking status: Former Smoker     Packs/day: 1.00     Years: 52.00     Pack years: 52.00     Types: Cigarettes     Quit date: 8/3/2021     Years since quittin.0     Smokeless tobacco: Never Used     Tobacco comment: 1/2 PPD   Substance Use Topics     Alcohol use: Not Currently     Comment: x1-2 yr     Drug use: No       Family History   I have reviewed this patient's family history and updated it with pertinent information if needed.  Family History    Problem Relation Age of Onset     Cancer Mother         bladder     Cardiovascular Father         alive,multiple heart attacks     Diabetes Maternal Grandmother      Lung Cancer Maternal Grandmother      Blood Disease Brother         clotting disorder       Prior to Admission Medications   Prior to Admission Medications   Prescriptions Last Dose Informant Patient Reported? Taking?   Calcium Carbonate-Vitamin D3 (CALCIUM 600-D) 600-400 MG-UNIT TABS  Self Yes No   Sig: Take 1 tablet by mouth every evening   acetaminophen (TYLENOL) 500 MG tablet  Self Yes No   Sig: Take 500-1,000 mg by mouth every 6 hours as needed for mild pain   aspirin (ASA) 81 MG chewable tablet  Self No No   Sig: Take 1 tablet (81 mg) by mouth daily   carvedilol (COREG) 6.25 MG tablet  Self Yes No   Sig: Take 6.25 mg by mouth 2 times daily (with meals)   clopidogrel (PLAVIX) 75 MG tablet  Self No No   Sig: Take 1 tablet (75 mg) by mouth daily   denosumab (PROLIA) 60 MG/ML SOLN injection  Self No No   Sig: Inject 1 mL (60 mg) Subcutaneous every 6 months INDICATION: TO TREAT OSTEOPOROSIS   diphenhydrAMINE (BENADRYL) 25 MG tablet  Self Yes No   Sig: Take 25-50 mg by mouth once as needed for itching or allergies (30 minutes to 2 hours before procedure using contrast)   fluticasone-vilanterol (BREO ELLIPTA) 200-25 MCG/INH inhaler  Self Yes No   Sig: Inhale 1 puff into the lungs daily   lisinopril (ZESTRIL) 20 MG tablet  Self Yes No   Sig: Take 20 mg by mouth every morning   methylPREDNISolone (MEDROL) 16 MG tablet  Self No No   Sig: Take 32 mg by mouth 12 hours before the procedure and repeat 32 mg by mouth 2 hours before the procedure to prevent contrast reaction.   nicotine (NICODERM CQ) 14 MG/24HR 24 hr patch  Self No No   Sig: Place 1 patch onto the skin every 24 hours   nicotine (NICODERM CQ) 21 MG/24HR 24 hr patch  Self No No   Sig: Place 1 patch onto the skin every 24 hours   nicotine (NICODERM CQ) 7 MG/24HR 24 hr patch  Self No No   Sig:  Place 1 patch onto the skin every 24 hours Start after completing 14 mg patches.   nitroGLYcerin (NITROSTAT) 0.4 MG sublingual tablet  Self No No   Sig: Place 1 tablet (0.4 mg) under the tongue See Admin Instructions for chest pain   omeprazole (PRILOSEC) 20 MG DR capsule   No No   Sig: TAKE ONE CAPSULE BY MOUTH DAILY   rosuvastatin (CRESTOR) 40 MG tablet   No No   Sig: Take 1 tablet (40 mg) by mouth At Bedtime      Facility-Administered Medications: None     Allergies   Allergies   Allergen Reactions     Contrast Dye Anaphylaxis     RASH, FACIAL AND NECK SWELLING, SOB, WHEEZING     Pantoprazole      Protonix caused diffuse edema     Chantix [Varenicline]      Terrible dreams     Penicillins Itching       Physical Exam   Vital Signs: Temp: 97.4  F (36.3  C) Temp src: Oral BP: (!) 157/76 Pulse: 62   Resp: 18 SpO2: 92 % O2 Device: None (Room air)    Weight: 109 lbs 0 oz    Constitutional: vs as above  General:  adult pt lying in bed chronically ill and uncomfortable appearing, tearful at times  Neuro: +follows commands wiggle toes and show 2 fingers bilat, face symmetric, tongue midline, speech fluent  Eyes pupils equal round 3mm briskly reactive bilat, sclera nonicteric, noninjected, conjunctiva pink,  Head, ENT & mouth: NC/AT, dry oral mucosa  Neck: supple  CV S1S2  resp: CTAB upper and lower lobes  gi:hypoacive bowel sounds, soft, diffusely tender to light palpation, nondisteded, NG tube in  Ext: no edema medial access back to distal thigh with several inch long incision in various stages of healing slightly ecchymotic but no surrounding cellulitic changes closed with 2 sutures but still approximately 1 inch opening in the middle of the incision minimal drainage covered with dry sterile dressing  Skin: no rashes on exposed skin  Lymph: Defer  Musculoskeletal no bony joint deformities    Data   Data reviewed today: I reviewed all medications, new labs and imaging results over the last 24 hours. I personally reviewed  no images or EKG's today.    Recent Labs   Lab 08/10/21  0833   WBC 8.3   HGB 12.1   MCV 89      *   POTASSIUM 4.1   CHLORIDE 100   CO2 26   BUN 12   CR 0.65   ANIONGAP 5   SONIA 9.5   GLC 94   ALBUMIN 3.6   PROTTOTAL 7.7   BILITOTAL 0.5   ALKPHOS 70   ALT 24   AST 19   LIPASE 132     Recent Results (from the past 24 hour(s))   CT Abdomen Pelvis w/o Contrast    Narrative    CT ABDOMEN AND PELVIS WITHOUT CONTRAST 8/10/2021 9:20 AM    CLINICAL HISTORY: Abdominal pain.  TECHNIQUE: CT scan of the abdomen and pelvis was performed without IV  contrast. Multiplanar reformats were obtained. Dose reduction  techniques were used.  CONTRAST: None.  COMPARISON: CT of the abdomen and pelvis performed 6/10/2020.    FINDINGS:   LOWER CHEST: Small calcified granuloma in the right lower lobe of the  lung. An indeterminate 0.4 cm pulmonary nodule in the right lower lobe  laterally (series 3 image 11) was not visualized on the previous exam.  The visualized lung bases are otherwise clear. Small hiatal hernia.  Coronary artery calcification.    HEPATOBILIARY: Prior cholecystectomy. No focal hepatic lesions are  seen.    PANCREAS: Normal.    SPLEEN: Normal.    ADRENAL GLANDS: Normal.    KIDNEYS/BLADDER: Mild right hydronephrosis is new since the previous  exam, with no definite cause identified.    BOWEL: There are multiple dilated fluid-filled loops of mid small  bowel in the lower abdomen, with mild associated mesenteric edema,  with an appearance suspicious for a closed loop small bowel  obstruction. A probable transition point is noted in the midabdomen  right of midline (series 4 image 26). No convincing evidence for  colitis or diverticulitis. The appendix is not clearly identified on  this exam, but there is no convincing inflammatory change in the  expected region of the appendix.    PELVIC ORGANS: Unremarkable.    LYMPH NODES: An enlarged right common iliac chain lymph node (series 3  image 110) measures 2.3 x 1.8  cm, and has increased in size. A few  mildly enlarged bilateral inguinal lymph nodes have also increased in  size.    VASCULATURE: Advanced atherosclerotic aortoiliac calcification.  Postoperative changes of aortofemoral bypass graft.    ADDITIONAL FINDINGS: None.    MUSCULOSKELETAL: Unremarkable.      Impression    IMPRESSION:   1.  Multiple dilated fluid-filled loops of mid small bowel in the  lower abdomen have an appearance suspicious for a closed loop small  bowel obstruction.  2.  An enlarged right common iliac chain lymph node and multiple  enlarged bilateral inguinal lymph nodes have increased in size, and  are indeterminate.  3.  Indeterminate 0.4 cm right lower lobe pulmonary nodule is new  since the previous exam. Please refer to pulmonary nodule follow-up  guidelines below.    Recommendations for one or multiple incidental lung nodules < 6mm :    Low risk patients: No routine follow-up.    High risk patients: Optional follow-up CT at 12 months; if  unchanged, no further follow-up.    *Low Risk: Minimal or absent history of smoking or other known risk  factors.  *Nonsolid (ground glass) or partly solid nodules may require longer  follow-up to exclude indolent adenocarcinoma.  *Recommendations based on Guidelines for the Management of Incidental  Pulmonary Nodules Detected at CT: From the Fleischner Society 2017,  Radiology 2017.  *Guidelines apply to incidental nodules in patients who are 35 years  or older.  *Guidelines do not apply to lung cancer screening, patients with  immunosuppression, or patients with known primary cancer.

## 2021-08-10 NOTE — ED NOTES
"[facility]  ED Nurse Handoff Report    ED Chief complaint: Abdominal Pain      ED Diagnosis:   Final diagnoses:   SBO (small bowel obstruction) (H)       Code Status: not addressed     Allergies:   Allergies   Allergen Reactions     Contrast Dye Anaphylaxis     RASH, FACIAL AND NECK SWELLING, SOB, WHEEZING     Pantoprazole      Protonix caused diffuse edema     Chantix [Varenicline]      Terrible dreams     Penicillins Itching       Patient Story: abd pain woke pt up, one emesis here,   Focused Assessment:  abd pain comes and goes, rates at 10, better after Morphine zofran, , small bowel obst     Treatments and/or interventions provided: NG , morphine, zofran   Patient's response to treatments and/or interventions: better for a while then pain comes back     To be done/followed up on inpatient unit:  unknown     Does this patient have any cognitive concerns?: none     Activity level - Baseline/Home:  Independent  Activity Level - Current:   Unknown    Patient's Preferred language: English   Needed?: NoIsolation: None  Infection: Not Applicable  Patient tested for COVID 19 prior to admission: YES  Bariatric?: No    Vital Signs:   Vitals:    08/10/21 0809 08/10/21 0900 08/10/21 0950 08/10/21 1000   BP: (!) 181/94 (!) 184/84 (!) 157/76    Pulse: 74 62     Resp: 18      Temp: 97.4  F (36.3  C)      TempSrc: Oral      SpO2: 100% 97%  92%   Weight: 49.4 kg (109 lb)      Height: 1.575 m (5' 2\")          Cardiac Rhythm:     Was the PSS-3 completed:   Yes  What interventions are required if any?               Family Comments: na  OBS brochure/video discussed/provided to patient/family: No              Name of person given brochure if not patient: na              Relationship to patient: na    For the majority of the shift this patient's behavior was Green.   Behavioral interventions performed were na.    ED NURSE PHONE NUMBER: [phone]       "

## 2021-08-10 NOTE — ED PROVIDER NOTES
History   Chief Complaint:  Abdominal Pain       HPI   Shirley Hendricks is a 60 year old female tobacco user with history of hyperlipidemia, hypertension, COPD, and MI who presents with abdominal pain. This morning, around 0630, the patient reports that she experienced the sudden onset of periumbilical abdominal pain and nausea. She reports that her symptoms are intermittent but became more intense as the morning progressed, prompting her to present to the emergency department. Last night, she states that she ate a fish sandwich from [employer] and believes that her symptoms may be related. She states that her last bowel movement was last night but reports that she has been passing gas this morning. She reports a history of a cholecystectomy but reports that she does still have her appendix. She denies any history of similar symptoms. She denies any alcohol or drug use. She denies any vomiting, diarrhea, fever, chills, back pain, chest pain, shortness of breath, urinary symptoms, or any other symptoms. Of note, she reports that she has not taken any of her blood pressure medications today.      Review of Systems   Constitutional: Negative for chills and fever.   Respiratory: Negative for shortness of breath.    Cardiovascular: Negative for chest pain.   Gastrointestinal: Positive for abdominal distention and nausea. Negative for diarrhea and vomiting.   Genitourinary: Negative for difficulty urinating, dysuria, frequency and urgency.   Musculoskeletal: Negative for back pain.   All other systems reviewed and are negative.        Allergies:  Contrast Dye  Pantoprazole  Chantix   Penicillins    Medications:  Aspirin  Medrol  Nicoderm  Nitrostat  Prilosec  Crestor  Plavix    Past Medical History:    Anxiety  Bilateral Carpal Tunnel  Charcot-Breonna-Tooth Disease  COPD  Discoid Lupus Erythematosus  Embolism and thrombosis of unspecified artery  GERD  Hyperlipidemia  Hypertension  "  Lupus  Depression  MI  Osteoarthrosis  Peripheral vascular Disease  SVT  Thrombocytopenia  Asthma  Vitamin C Deficient  Ischemia of extremity with history of revascularization of same extremity  Thrombocytopenia  Cervicalgia  Reflux Esophagitis        Past Surgical History:    Femoropopliteal bypass graft  Wrapping of abdominal aneurysm  Cardiac catheterization  Carpal tunnel release-bilateral  Salpingectomy  Endarterectomy carotid  Left Knee surgery  Cholecystectomy  Angioplasty  Sinus Surgery       Family History:    Bladder cancer - mother  Heart attacks - father  Clotting disorder - brother    Social History:  Alcohol use: Negative  Tobacco use: Current  PCP: Vasquez Benoit  Presents to the ED alone    Physical Exam     Patient Vitals for the past 24 hrs:   BP Temp Temp src Pulse Resp SpO2 Height Weight   08/10/21 1000 -- -- -- -- -- 92 % -- --   08/10/21 0950 (!) 157/76 -- -- -- -- -- -- --   08/10/21 0900 (!) 184/84 -- -- 62 -- 97 % -- --   08/10/21 0809 (!) 181/94 97.4  F (36.3  C) Oral 74 18 100 % 1.575 m (5' 2\") 49.4 kg (109 lb)       Physical Exam  Constitutional: Well developed, uncomfortable, nontox appearance  Head: Atraumatic.   Mouth/Throat: Oropharynx is clear and moist.   Neck:  no stridor  Eyes: no scleral icterus  Cardiovascular: RRR, 2+ bilat radial, DP pulses  Pulmonary/Chest: nml resp effort, Clear BS bilat  Abdominal: ND, soft, diffuse abdominal tenderness with voluntary guarding,, no rebound  : Mild CVA tenderness bilat  Ext: Warm, well perfused, no edema  Neurological: A&O, symmetric facies, moves ext x4  Skin: Skin is warm and dry.   Psychiatric: Behavior is normal. Thought content normal.   Nursing note and vitals reviewed.      Emergency Department Course     Imaging:    CT Abd/pelvis with contrast:  1.  Multiple dilated fluid-filled loops of mid small bowel in the   lower abdomen have an appearance suspicious for a closed loop small   bowel obstruction.   2.  An enlarged right common " iliac chain lymph node and multiple   enlarged bilateral inguinal lymph nodes have increased in size, and   are indeterminate.   3.  Indeterminate 0.4 cm right lower lobe pulmonary nodule is new   since the previous exam. Please refer to pulmonary nodule follow-up   guidelines below. As per radiology.     Imaging independently reviewed and agree with radiologist interpretation.    Laboratory:     CBC: WBC 8.3, HGB 12.1,     CMP: Sodium 131 (L), o/w WNL (Creatinine 0.65)     Lipase: 132    Lactic acid (result time 0904) 1.1     Asymptomatic COVID19 Virus PCR by nasopharyngeal swab: In process    Emergency Department Course:    Reviewed:  I reviewed nursing notes, vitals, past medical history and care everywhere.    Assessments:  0847 I obtained history and examined the patient as noted above.     1018 I rechecked the patient and explained findings.     1044 I returned to recheck the patient and explain the plan for admission.    Consults:  1029 I consulted with Dr. Ferreira, General Surgery, regarding the patient's history and presentation here in the emergency department.    1040  I consulted with Dr. Mac of the hospitalist services. They are in agreement to accept the patient for admission for further monitoring, evaluation, and treatment.    Interventions:  0835 NS, 1 L, IV  0843 Zofran 4 mg IV  0844 Zofran 4 mg IV    Disposition:  The patient was admitted to the hospital under the care of Dr. Mac.       Impression & Plan     CMS Diagnoses: None    Medical Decision Makin year old female presenting w/ diffuse abdominal pain    DDx includes hollow viscus perforation, bowel ischemia, aortic pathology, UTI, gastritis, early gastroenteritis, pancreatitis, hepatitis.  Doubt ACS given reproducible tenderness on exam.  Labs significant for hyponatremia, normal lactic acid.  Imaging sig for findings concerning for closed-loop small bowel obstruction.  Discussed patient with general surgery who recommended  nasogastric tube as well as admission to the hospital service for further evaluation and management with plan for general surgery consult.  Patient counseled on all results, disposition and diagnosis.  They are understanding and agreeable to plan. Patient admitted in stable condition.         Covid-19  Shirley Hendricks was evaluated during a global COVID-19 pandemic, which necessitated consideration that the patient might be at risk for infection with the SARS-CoV-2 virus that causes COVID-19.   Applicable protocols for evaluation were followed during the patient's care.   COVID-19 was considered as part of the patient's evaluation. The plan for testing is:  a test was obtained during this visit.    Diagnosis:    ICD-10-CM    1. SBO (small bowel obstruction) (H)  K56.609        Scribe Disclosure:  Bar IRAHETA, am serving as a scribe at 8:40 AM on 8/10/2021 to document services personally performed by Dimitry Kitchen MD based on my observations and the provider's statements to me.              Dimitry Kitchen MD  08/10/21 6819

## 2021-08-10 NOTE — TELEPHONE ENCOUNTER
Patient is 10/10 pain and doubling over- advised ER now with a - patient verbalized understanding    Thank You,    Jojo RAMOSRN  [facility]  [phone]          Reason for Disposition    Pain is mainly in upper abdomen (if needed ask: 'is it mainly above the belly button?')    SEVERE abdominal pain (e.g., excruciating)    Pain lasting > 10 minutes and over 50 years old    Patient sounds very sick or weak to the triager    Pain lasting > 10 minutes and over 35 years old and at least one cardiac risk factor    Pain lasting > 10 minutes and history of heart disease (i.e., heart attack, bypass surgery, angina, angioplasty, CHF)    Additional Information    Negative: Passed out (i.e., fainted, collapsed and was not responding)    Negative: Shock suspected (e.g., cold/pale/clammy skin, too weak to stand, low BP, rapid pulse)    Negative: Sounds like a life-threatening emergency to the triager    Negative: Passed out (i.e., fainted, collapsed and was not responding)    Negative: Shock suspected (e.g., cold/pale/clammy skin, too weak to stand, low BP, rapid pulse)    Negative: Visible sweat on face or sweat is dripping down    Negative: Chest pain    Negative: Pain lasting > 10 minutes and over 40 years old and associated chest, arm, neck, upper back, or jaw pain    Negative: Vomiting bile (green color)    Negative: Constant abdominal pain lasting > 2 hours    Negative: Recent injury to the abdomen    Negative: Vomiting red blood or black (coffee ground) material    Negative: Bloody, black, or tarry bowel movements (Exception: chronic-unchanged  black-grey bowel movements and is taking iron pills or Pepto-bismol)    Negative: Pregnant > 24 weeks and hand or face swelling    Negative: Vomiting and abdomen looks much more swollen than usual    Negative: White of the eyes have turned yellow (i.e.,  jaundice)    Negative: Fever > 103 F (39.4 C)    Negative: Fever > 101 F (38.3 C) and over 60 years of  "age    Negative: Fever > 100.0 F (37.8 C) and has diabetes mellitus or a weak immune system (e.g., HIV positive, cancer chemotherapy, organ transplant, splenectomy, chronic steroids)    Negative: Fever > 100.0 F (37.8 C) and bedridden (e.g., nursing home patient, stroke, chronic illness, recovering from surgery)    Answer Assessment - Initial Assessment Questions  1. LOCATION: \"Where does it hurt?\"       Above umbilicus  2. RADIATION: \"Does the pain shoot anywhere else?\" (e.g., chest, back)      All over  3. ONSET: \"When did the pain begin?\" (e.g., minutes, hours or days ago)       Woke her up at 6am  4. SUDDEN: \"Gradual or sudden onset?\"      sudden  5. PATTERN \"Does the pain come and go, or is it constant?\"     - If constant: \"Is it getting better, staying the same, or worsening?\"       (Note: Constant means the pain never goes away completely; most serious pain is constant and it progresses)      - If intermittent: \"How long does it last?\" \"Do you have pain now?\"      (Note: Intermittent means the pain goes away completely between bouts)     Comes in waves  6. SEVERITY: \"How bad is the pain?\"  (e.g., Scale 1-10; mild, moderate, or severe)    - MILD (1-3): doesn't interfere with normal activities, abdomen soft and not tender to touch     - MODERATE (4-7): interferes with normal activities or awakens from sleep, tender to touch     - SEVERE (8-10): excruciating pain, doubled over, unable to do any normal activities       10  7. RECURRENT SYMPTOM: \"Have you ever had this type of abdominal pain before?\" If so, ask: \"When was the last time?\" and \"What happened that time?\"     never  8. CAUSE: \"What do you think is causing the abdominal pain?\"      Food poisoning  9. RELIEVING/AGGRAVATING FACTORS: \"What makes it better or worse?\" (e.g., movement, antacids, bowel movement)      nonthing  10. OTHER SYMPTOMS: \"Has there been any vomiting, diarrhea, constipation, or urine problems?\"        none  11. PREGNANCY: \"Is there " "any chance you are pregnant?\" \"When was your last menstrual period?\"        na    Protocols used: ABDOMINAL PAIN - FEMALE-A-OH, ABDOMINAL PAIN - UPPER-A-OH      "

## 2021-08-11 ENCOUNTER — ANESTHESIA (OUTPATIENT)
Dept: SURGERY | Facility: CLINIC | Age: 61
DRG: 335 | End: 2021-08-11
Payer: COMMERCIAL

## 2021-08-11 ENCOUNTER — APPOINTMENT (OUTPATIENT)
Dept: GENERAL RADIOLOGY | Facility: CLINIC | Age: 61
DRG: 335 | End: 2021-08-11
Attending: SURGERY
Payer: COMMERCIAL

## 2021-08-11 ENCOUNTER — ANESTHESIA EVENT (OUTPATIENT)
Dept: SURGERY | Facility: CLINIC | Age: 61
DRG: 335 | End: 2021-08-11
Payer: COMMERCIAL

## 2021-08-11 ENCOUNTER — APPOINTMENT (OUTPATIENT)
Dept: GENERAL RADIOLOGY | Facility: CLINIC | Age: 61
DRG: 335 | End: 2021-08-11
Attending: PHYSICIAN ASSISTANT
Payer: COMMERCIAL

## 2021-08-11 LAB
ANION GAP SERPL CALCULATED.3IONS-SCNC: 10 MMOL/L (ref 3–14)
BUN SERPL-MCNC: 16 MG/DL (ref 7–30)
CALCIUM SERPL-MCNC: 8.9 MG/DL (ref 8.5–10.1)
CHLORIDE BLD-SCNC: 104 MMOL/L (ref 94–109)
CO2 SERPL-SCNC: 21 MMOL/L (ref 20–32)
CREAT SERPL-MCNC: 0.76 MG/DL (ref 0.52–1.04)
ERYTHROCYTE [DISTWIDTH] IN BLOOD BY AUTOMATED COUNT: 12.8 % (ref 10–15)
GFR SERPL CREATININE-BSD FRML MDRD: 84 ML/MIN/1.73M2
GLUCOSE BLD-MCNC: 95 MG/DL (ref 70–99)
HCT VFR BLD AUTO: 36.8 % (ref 35–47)
HGB BLD-MCNC: 12 G/DL (ref 11.7–15.7)
MAGNESIUM SERPL-MCNC: 1.8 MG/DL (ref 1.6–2.3)
MCH RBC QN AUTO: 29.1 PG (ref 26.5–33)
MCHC RBC AUTO-ENTMCNC: 32.6 G/DL (ref 31.5–36.5)
MCV RBC AUTO: 89 FL (ref 78–100)
PLATELET # BLD AUTO: 174 10E3/UL (ref 150–450)
POTASSIUM BLD-SCNC: 4.2 MMOL/L (ref 3.4–5.3)
RBC # BLD AUTO: 4.13 10E6/UL (ref 3.8–5.2)
SODIUM SERPL-SCNC: 135 MMOL/L (ref 133–144)
WBC # BLD AUTO: 11.3 10E3/UL (ref 4–11)

## 2021-08-11 PROCEDURE — 250N000025 HC SEVOFLURANE, PER MIN: Performed by: SURGERY

## 2021-08-11 PROCEDURE — 258N000003 HC RX IP 258 OP 636: Performed by: PHYSICIAN ASSISTANT

## 2021-08-11 PROCEDURE — 360N000076 HC SURGERY LEVEL 3, PER MIN: Performed by: SURGERY

## 2021-08-11 PROCEDURE — 250N000011 HC RX IP 250 OP 636: Performed by: ANESTHESIOLOGY

## 2021-08-11 PROCEDURE — 44005 FREEING OF BOWEL ADHESION: CPT | Mod: AS | Performed by: PHYSICIAN ASSISTANT

## 2021-08-11 PROCEDURE — 250N000011 HC RX IP 250 OP 636: Performed by: PHYSICIAN ASSISTANT

## 2021-08-11 PROCEDURE — 370N000017 HC ANESTHESIA TECHNICAL FEE, PER MIN: Performed by: SURGERY

## 2021-08-11 PROCEDURE — 250N000009 HC RX 250: Performed by: NURSE ANESTHETIST, CERTIFIED REGISTERED

## 2021-08-11 PROCEDURE — 250N000013 HC RX MED GY IP 250 OP 250 PS 637: Performed by: PHYSICIAN ASSISTANT

## 2021-08-11 PROCEDURE — 710N000009 HC RECOVERY PHASE 1, LEVEL 1, PER MIN: Performed by: SURGERY

## 2021-08-11 PROCEDURE — 0WJP4ZZ INSPECTION OF GASTROINTESTINAL TRACT, PERCUTANEOUS ENDOSCOPIC APPROACH: ICD-10-PCS | Performed by: SURGERY

## 2021-08-11 PROCEDURE — 120N000001 HC R&B MED SURG/OB

## 2021-08-11 PROCEDURE — 272N000001 HC OR GENERAL SUPPLY STERILE: Performed by: SURGERY

## 2021-08-11 PROCEDURE — 258N000003 HC RX IP 258 OP 636: Performed by: NURSE ANESTHETIST, CERTIFIED REGISTERED

## 2021-08-11 PROCEDURE — 258N000003 HC RX IP 258 OP 636: Performed by: ANESTHESIOLOGY

## 2021-08-11 PROCEDURE — 250N000009 HC RX 250: Performed by: SURGERY

## 2021-08-11 PROCEDURE — 83735 ASSAY OF MAGNESIUM: CPT | Performed by: NURSE PRACTITIONER

## 2021-08-11 PROCEDURE — 250N000011 HC RX IP 250 OP 636: Performed by: NURSE ANESTHETIST, CERTIFIED REGISTERED

## 2021-08-11 PROCEDURE — 250N000009 HC RX 250: Performed by: PHYSICIAN ASSISTANT

## 2021-08-11 PROCEDURE — 85027 COMPLETE CBC AUTOMATED: CPT | Performed by: NURSE PRACTITIONER

## 2021-08-11 PROCEDURE — 0DN80ZZ RELEASE SMALL INTESTINE, OPEN APPROACH: ICD-10-PCS | Performed by: SURGERY

## 2021-08-11 PROCEDURE — 44005 FREEING OF BOWEL ADHESION: CPT | Performed by: SURGERY

## 2021-08-11 PROCEDURE — 36415 COLL VENOUS BLD VENIPUNCTURE: CPT | Performed by: NURSE PRACTITIONER

## 2021-08-11 PROCEDURE — G0463 HOSPITAL OUTPT CLINIC VISIT: HCPCS

## 2021-08-11 PROCEDURE — 999N000065 XR ABDOMEN 1 VIEW

## 2021-08-11 PROCEDURE — P9041 ALBUMIN (HUMAN),5%, 50ML: HCPCS | Performed by: NURSE ANESTHETIST, CERTIFIED REGISTERED

## 2021-08-11 PROCEDURE — 999N000063 XR ABDOMEN PORT 1 VIEWS

## 2021-08-11 PROCEDURE — 0DNU0ZZ RELEASE OMENTUM, OPEN APPROACH: ICD-10-PCS | Performed by: SURGERY

## 2021-08-11 PROCEDURE — 999N000141 HC STATISTIC PRE-PROCEDURE NURSING ASSESSMENT: Performed by: SURGERY

## 2021-08-11 PROCEDURE — 99233 SBSQ HOSP IP/OBS HIGH 50: CPT | Performed by: PHYSICIAN ASSISTANT

## 2021-08-11 PROCEDURE — 250N000011 HC RX IP 250 OP 636: Performed by: NURSE PRACTITIONER

## 2021-08-11 PROCEDURE — 80048 BASIC METABOLIC PNL TOTAL CA: CPT | Performed by: NURSE PRACTITIONER

## 2021-08-11 RX ORDER — MAGNESIUM HYDROXIDE 1200 MG/15ML
LIQUID ORAL PRN
Status: DISCONTINUED | OUTPATIENT
Start: 2021-08-11 | End: 2021-08-11 | Stop reason: HOSPADM

## 2021-08-11 RX ORDER — FENTANYL CITRATE 0.05 MG/ML
50 INJECTION, SOLUTION INTRAMUSCULAR; INTRAVENOUS EVERY 5 MIN PRN
Status: DISCONTINUED | OUTPATIENT
Start: 2021-08-11 | End: 2021-08-11 | Stop reason: HOSPADM

## 2021-08-11 RX ORDER — HYDROMORPHONE HYDROCHLORIDE 1 MG/ML
0.5 INJECTION, SOLUTION INTRAMUSCULAR; INTRAVENOUS; SUBCUTANEOUS
Status: DISCONTINUED | OUTPATIENT
Start: 2021-08-11 | End: 2021-08-11

## 2021-08-11 RX ORDER — DEXAMETHASONE SODIUM PHOSPHATE 4 MG/ML
INJECTION, SOLUTION INTRA-ARTICULAR; INTRALESIONAL; INTRAMUSCULAR; INTRAVENOUS; SOFT TISSUE PRN
Status: DISCONTINUED | OUTPATIENT
Start: 2021-08-11 | End: 2021-08-11

## 2021-08-11 RX ORDER — SENNOSIDES 8.6 MG
8.6 TABLET ORAL 2 TIMES DAILY PRN
Status: DISCONTINUED | OUTPATIENT
Start: 2021-08-11 | End: 2021-08-16 | Stop reason: HOSPADM

## 2021-08-11 RX ORDER — DIPHENHYDRAMINE HYDROCHLORIDE 50 MG/ML
25 INJECTION INTRAMUSCULAR; INTRAVENOUS EVERY 6 HOURS PRN
Status: DISCONTINUED | OUTPATIENT
Start: 2021-08-11 | End: 2021-08-16 | Stop reason: HOSPADM

## 2021-08-11 RX ORDER — ONDANSETRON 2 MG/ML
4 INJECTION INTRAMUSCULAR; INTRAVENOUS EVERY 30 MIN PRN
Status: DISCONTINUED | OUTPATIENT
Start: 2021-08-11 | End: 2021-08-11 | Stop reason: HOSPADM

## 2021-08-11 RX ORDER — CEFAZOLIN SODIUM 2 G/100ML
2 INJECTION, SOLUTION INTRAVENOUS SEE ADMIN INSTRUCTIONS
Status: DISCONTINUED | OUTPATIENT
Start: 2021-08-11 | End: 2021-08-11 | Stop reason: HOSPADM

## 2021-08-11 RX ORDER — HYDROMORPHONE HYDROCHLORIDE 1 MG/ML
.3-.5 INJECTION, SOLUTION INTRAMUSCULAR; INTRAVENOUS; SUBCUTANEOUS
Status: DISCONTINUED | OUTPATIENT
Start: 2021-08-11 | End: 2021-08-16 | Stop reason: HOSPADM

## 2021-08-11 RX ORDER — ONDANSETRON 2 MG/ML
INJECTION INTRAMUSCULAR; INTRAVENOUS PRN
Status: DISCONTINUED | OUTPATIENT
Start: 2021-08-11 | End: 2021-08-11

## 2021-08-11 RX ORDER — OXYCODONE HYDROCHLORIDE 5 MG/1
5-10 TABLET ORAL EVERY 4 HOURS PRN
Status: DISCONTINUED | OUTPATIENT
Start: 2021-08-11 | End: 2021-08-16 | Stop reason: HOSPADM

## 2021-08-11 RX ORDER — LIDOCAINE HYDROCHLORIDE 20 MG/ML
INJECTION, SOLUTION INFILTRATION; PERINEURAL PRN
Status: DISCONTINUED | OUTPATIENT
Start: 2021-08-11 | End: 2021-08-11

## 2021-08-11 RX ORDER — ALBUMIN, HUMAN INJ 5% 5 %
SOLUTION INTRAVENOUS CONTINUOUS PRN
Status: DISCONTINUED | OUTPATIENT
Start: 2021-08-11 | End: 2021-08-11

## 2021-08-11 RX ORDER — BUPIVACAINE HYDROCHLORIDE 5 MG/ML
INJECTION, SOLUTION PERINEURAL PRN
Status: DISCONTINUED | OUTPATIENT
Start: 2021-08-11 | End: 2021-08-11 | Stop reason: HOSPADM

## 2021-08-11 RX ORDER — DIPHENHYDRAMINE HCL 25 MG
25 CAPSULE ORAL EVERY 6 HOURS PRN
Status: DISCONTINUED | OUTPATIENT
Start: 2021-08-11 | End: 2021-08-16 | Stop reason: HOSPADM

## 2021-08-11 RX ORDER — HYDROMORPHONE HCL IN WATER/PF 6 MG/30 ML
0.4 PATIENT CONTROLLED ANALGESIA SYRINGE INTRAVENOUS EVERY 5 MIN PRN
Status: DISCONTINUED | OUTPATIENT
Start: 2021-08-11 | End: 2021-08-11 | Stop reason: HOSPADM

## 2021-08-11 RX ORDER — EPHEDRINE SULFATE 50 MG/ML
INJECTION, SOLUTION INTRAMUSCULAR; INTRAVENOUS; SUBCUTANEOUS PRN
Status: DISCONTINUED | OUTPATIENT
Start: 2021-08-11 | End: 2021-08-11

## 2021-08-11 RX ORDER — OXYCODONE HYDROCHLORIDE 5 MG/1
5 TABLET ORAL EVERY 4 HOURS PRN
Status: DISCONTINUED | OUTPATIENT
Start: 2021-08-11 | End: 2021-08-11

## 2021-08-11 RX ORDER — HYDRALAZINE HYDROCHLORIDE 20 MG/ML
10 INJECTION INTRAMUSCULAR; INTRAVENOUS ONCE
Status: COMPLETED | OUTPATIENT
Start: 2021-08-11 | End: 2021-08-11

## 2021-08-11 RX ORDER — FENTANYL CITRATE 50 UG/ML
INJECTION, SOLUTION INTRAMUSCULAR; INTRAVENOUS PRN
Status: DISCONTINUED | OUTPATIENT
Start: 2021-08-11 | End: 2021-08-11

## 2021-08-11 RX ORDER — PROPOFOL 10 MG/ML
INJECTION, EMULSION INTRAVENOUS PRN
Status: DISCONTINUED | OUTPATIENT
Start: 2021-08-11 | End: 2021-08-11

## 2021-08-11 RX ORDER — LABETALOL 20 MG/4 ML (5 MG/ML) INTRAVENOUS SYRINGE
PRN
Status: DISCONTINUED | OUTPATIENT
Start: 2021-08-11 | End: 2021-08-11

## 2021-08-11 RX ORDER — MAGNESIUM SULFATE HEPTAHYDRATE 40 MG/ML
2 INJECTION, SOLUTION INTRAVENOUS ONCE
Status: COMPLETED | OUTPATIENT
Start: 2021-08-11 | End: 2021-08-11

## 2021-08-11 RX ORDER — SODIUM CHLORIDE, SODIUM LACTATE, POTASSIUM CHLORIDE, CALCIUM CHLORIDE 600; 310; 30; 20 MG/100ML; MG/100ML; MG/100ML; MG/100ML
INJECTION, SOLUTION INTRAVENOUS CONTINUOUS
Status: DISCONTINUED | OUTPATIENT
Start: 2021-08-11 | End: 2021-08-11 | Stop reason: HOSPADM

## 2021-08-11 RX ORDER — PIPERACILLIN SODIUM, TAZOBACTAM SODIUM 3; .375 G/15ML; G/15ML
3.38 INJECTION, POWDER, LYOPHILIZED, FOR SOLUTION INTRAVENOUS EVERY 6 HOURS
Status: COMPLETED | OUTPATIENT
Start: 2021-08-11 | End: 2021-08-15

## 2021-08-11 RX ORDER — ONDANSETRON 4 MG/1
4 TABLET, ORALLY DISINTEGRATING ORAL EVERY 30 MIN PRN
Status: DISCONTINUED | OUTPATIENT
Start: 2021-08-11 | End: 2021-08-11 | Stop reason: HOSPADM

## 2021-08-11 RX ORDER — SODIUM CHLORIDE, SODIUM LACTATE, POTASSIUM CHLORIDE, CALCIUM CHLORIDE 600; 310; 30; 20 MG/100ML; MG/100ML; MG/100ML; MG/100ML
INJECTION, SOLUTION INTRAVENOUS CONTINUOUS PRN
Status: DISCONTINUED | OUTPATIENT
Start: 2021-08-11 | End: 2021-08-11

## 2021-08-11 RX ORDER — POLYETHYLENE GLYCOL 3350 17 G/17G
17 POWDER, FOR SOLUTION ORAL DAILY PRN
Status: DISCONTINUED | OUTPATIENT
Start: 2021-08-11 | End: 2021-08-16 | Stop reason: HOSPADM

## 2021-08-11 RX ORDER — CEFAZOLIN SODIUM 2 G/100ML
2 INJECTION, SOLUTION INTRAVENOUS
Status: DISCONTINUED | OUTPATIENT
Start: 2021-08-11 | End: 2021-08-11 | Stop reason: HOSPADM

## 2021-08-11 RX ADMIN — FAMOTIDINE 20 MG: 10 INJECTION, SOLUTION INTRAVENOUS at 03:17

## 2021-08-11 RX ADMIN — Medication 12 MCG: at 14:25

## 2021-08-11 RX ADMIN — LABETALOL HYDROCHLORIDE 10 MG: 5 INJECTION, SOLUTION INTRAVENOUS at 17:40

## 2021-08-11 RX ADMIN — HYDROMORPHONE HYDROCHLORIDE 0.5 MG: 1 INJECTION, SOLUTION INTRAMUSCULAR; INTRAVENOUS; SUBCUTANEOUS at 20:02

## 2021-08-11 RX ADMIN — LABETALOL 20 MG/4 ML (5 MG/ML) INTRAVENOUS SYRINGE 10 MG: at 15:06

## 2021-08-11 RX ADMIN — HYDROMORPHONE HYDROCHLORIDE 0.5 MG: 1 INJECTION, SOLUTION INTRAMUSCULAR; INTRAVENOUS; SUBCUTANEOUS at 22:21

## 2021-08-11 RX ADMIN — PHENYLEPHRINE HYDROCHLORIDE 200 MCG: 10 INJECTION INTRAVENOUS at 13:11

## 2021-08-11 RX ADMIN — HYDROMORPHONE HYDROCHLORIDE 0.5 MG: 1 INJECTION, SOLUTION INTRAMUSCULAR; INTRAVENOUS; SUBCUTANEOUS at 17:40

## 2021-08-11 RX ADMIN — PHENYLEPHRINE HYDROCHLORIDE 100 MCG: 10 INJECTION INTRAVENOUS at 14:41

## 2021-08-11 RX ADMIN — HYDROMORPHONE HYDROCHLORIDE 0.5 MG: 1 INJECTION, SOLUTION INTRAMUSCULAR; INTRAVENOUS; SUBCUTANEOUS at 08:17

## 2021-08-11 RX ADMIN — HYDROMORPHONE HYDROCHLORIDE 0.5 MG: 1 INJECTION, SOLUTION INTRAMUSCULAR; INTRAVENOUS; SUBCUTANEOUS at 14:05

## 2021-08-11 RX ADMIN — HYDRALAZINE HYDROCHLORIDE 10 MG: 20 INJECTION INTRAMUSCULAR; INTRAVENOUS at 16:01

## 2021-08-11 RX ADMIN — VANCOMYCIN HYDROCHLORIDE 1250 MG: 5 INJECTION, POWDER, LYOPHILIZED, FOR SOLUTION INTRAVENOUS at 20:08

## 2021-08-11 RX ADMIN — PHENYLEPHRINE HYDROCHLORIDE 0.3 MCG/KG/MIN: 10 INJECTION INTRAVENOUS at 13:15

## 2021-08-11 RX ADMIN — ROCURONIUM BROMIDE 30 MG: 10 INJECTION INTRAVENOUS at 13:05

## 2021-08-11 RX ADMIN — ROCURONIUM BROMIDE 20 MG: 10 INJECTION INTRAVENOUS at 13:48

## 2021-08-11 RX ADMIN — HYDROMORPHONE HYDROCHLORIDE 0.2 MG: 0.2 INJECTION, SOLUTION INTRAMUSCULAR; INTRAVENOUS; SUBCUTANEOUS at 06:46

## 2021-08-11 RX ADMIN — ONDANSETRON 4 MG: 2 INJECTION INTRAMUSCULAR; INTRAVENOUS at 06:49

## 2021-08-11 RX ADMIN — Medication 10 MG: at 13:27

## 2021-08-11 RX ADMIN — Medication 12 MCG: at 13:35

## 2021-08-11 RX ADMIN — HYDROMORPHONE HYDROCHLORIDE 0.2 MG: 0.2 INJECTION, SOLUTION INTRAMUSCULAR; INTRAVENOUS; SUBCUTANEOUS at 03:26

## 2021-08-11 RX ADMIN — LIDOCAINE HYDROCHLORIDE 80 MG: 20 INJECTION, SOLUTION INFILTRATION; PERINEURAL at 12:54

## 2021-08-11 RX ADMIN — LABETALOL HYDROCHLORIDE 10 MG: 5 INJECTION, SOLUTION INTRAVENOUS at 15:37

## 2021-08-11 RX ADMIN — FENTANYL CITRATE 50 MCG: 50 INJECTION, SOLUTION INTRAMUSCULAR; INTRAVENOUS at 12:54

## 2021-08-11 RX ADMIN — SODIUM CHLORIDE, POTASSIUM CHLORIDE, SODIUM LACTATE AND CALCIUM CHLORIDE: 600; 310; 30; 20 INJECTION, SOLUTION INTRAVENOUS at 10:55

## 2021-08-11 RX ADMIN — HYDRALAZINE HYDROCHLORIDE 10 MG: 20 INJECTION INTRAMUSCULAR; INTRAVENOUS at 08:25

## 2021-08-11 RX ADMIN — FENTANYL CITRATE 50 MCG: 50 INJECTION, SOLUTION INTRAMUSCULAR; INTRAVENOUS at 13:17

## 2021-08-11 RX ADMIN — ROSUVASTATIN CALCIUM 40 MG: 20 TABLET, FILM COATED ORAL at 22:34

## 2021-08-11 RX ADMIN — DEXAMETHASONE SODIUM PHOSPHATE 4 MG: 4 INJECTION, SOLUTION INTRA-ARTICULAR; INTRALESIONAL; INTRAMUSCULAR; INTRAVENOUS; SOFT TISSUE at 13:18

## 2021-08-11 RX ADMIN — MIDAZOLAM 1 MG: 1 INJECTION INTRAMUSCULAR; INTRAVENOUS at 12:54

## 2021-08-11 RX ADMIN — MIDAZOLAM 1 MG: 1 INJECTION INTRAMUSCULAR; INTRAVENOUS at 13:15

## 2021-08-11 RX ADMIN — PHENYLEPHRINE HYDROCHLORIDE 200 MCG: 10 INJECTION INTRAVENOUS at 13:21

## 2021-08-11 RX ADMIN — SODIUM CHLORIDE: 9 INJECTION, SOLUTION INTRAVENOUS at 17:27

## 2021-08-11 RX ADMIN — LISINOPRIL 20 MG: 10 TABLET ORAL at 22:34

## 2021-08-11 RX ADMIN — ONDANSETRON 4 MG: 2 INJECTION INTRAMUSCULAR; INTRAVENOUS at 14:37

## 2021-08-11 RX ADMIN — PHENYLEPHRINE HYDROCHLORIDE 200 MCG: 10 INJECTION INTRAVENOUS at 13:07

## 2021-08-11 RX ADMIN — PHENYLEPHRINE HYDROCHLORIDE 100 MCG: 10 INJECTION INTRAVENOUS at 14:43

## 2021-08-11 RX ADMIN — CARVEDILOL 6.25 MG: 6.25 TABLET, FILM COATED ORAL at 22:34

## 2021-08-11 RX ADMIN — MAGNESIUM SULFATE HEPTAHYDRATE 2 G: 40 INJECTION, SOLUTION INTRAVENOUS at 18:59

## 2021-08-11 RX ADMIN — SUCCINYLCHOLINE CHLORIDE 100 MG: 20 INJECTION, SOLUTION INTRAMUSCULAR; INTRAVENOUS; PARENTERAL at 12:54

## 2021-08-11 RX ADMIN — PROPOFOL 100 MG: 10 INJECTION, EMULSION INTRAVENOUS at 12:54

## 2021-08-11 RX ADMIN — HYDROMORPHONE HYDROCHLORIDE 0.2 MG: 0.2 INJECTION, SOLUTION INTRAMUSCULAR; INTRAVENOUS; SUBCUTANEOUS at 00:31

## 2021-08-11 RX ADMIN — SODIUM CHLORIDE, POTASSIUM CHLORIDE, SODIUM LACTATE AND CALCIUM CHLORIDE: 600; 310; 30; 20 INJECTION, SOLUTION INTRAVENOUS at 13:10

## 2021-08-11 RX ADMIN — Medication 8 MCG: at 13:38

## 2021-08-11 RX ADMIN — ALBUMIN HUMAN: 0.05 INJECTION, SOLUTION INTRAVENOUS at 13:21

## 2021-08-11 RX ADMIN — PIPERACILLIN AND TAZOBACTAM 3.38 G: 3; .375 INJECTION, POWDER, FOR SOLUTION INTRAVENOUS at 18:59

## 2021-08-11 ASSESSMENT — ENCOUNTER SYMPTOMS
ORTHOPNEA: 0
DYSRHYTHMIAS: 1

## 2021-08-11 ASSESSMENT — COPD QUESTIONNAIRES: COPD: 1

## 2021-08-11 ASSESSMENT — LIFESTYLE VARIABLES: TOBACCO_USE: 1

## 2021-08-11 ASSESSMENT — ACTIVITIES OF DAILY LIVING (ADL)
ADLS_ACUITY_SCORE: 14

## 2021-08-11 ASSESSMENT — MIFFLIN-ST. JEOR: SCORE: 983.25

## 2021-08-11 NOTE — PROGRESS NOTES
[facility]    General Surgery  Daily Note       Assessment and Plan:   Shirley Hendricks is a 60 year old female with small bowel obstruction     -Medical management and preoperative clearance per primary team  -N.p.o. and IV fluids  -NG to low intermittent suction  -Pain control as needed  -Due to failure to progress with conservative management and ongoing abdominal pain requiring increased doses of IV narcotics, I have recommended proceeding with exploratory laparoscopy, possible laparotomy, possible bowel resection.  The procedure and its risks and benefits were discussed with the patient and her daughter, who are in agreement with proceeding.        Interval History:   Patient complaining of significant abdominal pain with resulting hypertension.  Pain and blood pressure only improve with IV pain medication.  Abdominal x-ray this morning demonstrates persistently dilated small bowel loops.  White blood cell count increased today.           Physical Exam:   Temp: 98.8  F (37.1  C) Temp src: Oral BP: (!) 191/94 Pulse: 75   Resp: 18 SpO2: 93 % O2 Device: None (Room air)      I/O last 3 completed shifts:  In: 810 [I.V.:810]  Out: 350 [Emesis/NG output:350]      Constitutional: healthy and mild distress   Respiratory: Breathing nonlabored  Abdomen: Soft, distended, tender with palpation most pronounced in the central abdomen with associated guarding      Data   Recent Labs   Lab 08/11/21  0821 08/10/21  1452 08/10/21  0833   WBC 11.3*  --  8.3   HGB 12.0  --  12.1   MCV 89  --  89     --  198   NA  --   --  131*   POTASSIUM  --   --  4.1   CHLORIDE  --   --  100   CO2  --   --  26   BUN  --   --  12   CR  --  0.62 0.65   ANIONGAP  --   --  5   SONIA  --   --  9.5   GLC  --   --  94   ALBUMIN  --   --  3.6   PROTTOTAL  --   --  7.7   BILITOTAL  --   --  0.5   ALKPHOS  --   --  70   ALT  --   --  24   AST  --   --  19   ABDOMEN ONE VIEW  August 11, 2021 7:12 AM      HISTORY: Check NG  tube position.     COMPARISON: None.                                                                       IMPRESSION: NG tube in the region of the gastroesophageal junction,  consider advancement. Dilated small bowel noted.    Corina Ferreira MD

## 2021-08-11 NOTE — BRIEF OP NOTE
[facility]    Brief Operative Note    Pre-operative diagnosis: SBO (small bowel obstruction) (H) [K56.601]  Post-operative diagnosis Closed loop small bowel obstruction    Procedure: Procedure(s):  Exploratory laparoscopy,  laparoscopic lysis of adhesions, laparotomy  Surgeon: Surgeon(s) and Role:     * Corina Ferreira MD - Primary     * Krissy Flower PA-C - Assisting  Anesthesia: General   Estimated blood loss: 5 ml   Drains: None  Specimens: None  Findings:   Closed-loop small bowel obstruction secondary to extensive omental adhesions to the small bowel mesentery.  Small bowel loops hyperemic but viable.  Lysis of adhesions completed laparoscopically and through a limited midline incision..  Complications: None.  Implants: None    Corina Ferreira MD

## 2021-08-11 NOTE — ANESTHESIA CARE TRANSFER NOTE
Patient: Shirley Hendricks    Procedure(s):  Exploratory laparoscopy,  laparoscopic lysis of adhesions, laparotomy    Diagnosis: SBO (small bowel obstruction) (H) [K56.609]  Diagnosis Additional Information: No value filed.    Anesthesia Type:   General     Note:    Oropharynx: oropharynx clear of all foreign objects  Level of Consciousness: awake  Oxygen Supplementation: face mask  Level of Supplemental Oxygen (L/min / FiO2): 8  Independent Airway: airway patency satisfactory and stable  Dentition: dentition unchanged  Vital Signs Stable: post-procedure vital signs reviewed and stable  Report to RN Given: handoff report given  Patient transferred to: PACU    Handoff Report: Identifed the Patient, Identified the Reponsible Provider, Reviewed the pertinent medical history, Discussed the surgical course, Reviewed Intra-OP anesthesia mangement and issues during anesthesia, Set expectations for post-procedure period and Allowed opportunity for questions and acknowledgement of understanding      Vitals:  Vitals Value Taken Time   /85 08/11/21 1510   Temp     Pulse 82 08/11/21 1512   Resp 17 08/11/21 1512   SpO2 99 % 08/11/21 1512   Vitals shown include unvalidated device data.    Electronically Signed By: NOELLE Miner CRNA  August 11, 2021  3:14 PM

## 2021-08-11 NOTE — ANESTHESIA PROCEDURE NOTES
Airway       Patient location during procedure: OR       Procedure Start/Stop Times: 8/11/2021 12:56 PM  Staff -        Anesthesiologist:  William Butler MD       CRNA: Yanira Dhillon APRN CRNA       Performed By: CRNA  Consent for Airway        Urgency: elective  Indications and Patient Condition       Indications for airway management: lydia-procedural       Induction type:RSI       Mask difficulty assessment: 0 - not attempted    Final Airway Details       Final airway type: endotracheal airway       Successful airway: ETT - single  Endotracheal Airway Details        ETT size (mm): 7.0       Cuffed: yes       Successful intubation technique: video laryngoscopy       VL Blade Size: Glidescope 3       Grade View of Cords: 1       Adjucts: stylet       Position: Right       Measured from: gums/teeth       Secured at (cm): 22       Bite block used: None    Post intubation assessment        Placement verified by: capnometry, equal breath sounds and chest rise        Number of attempts at approach: 1       Secured with: pink tape       Ease of procedure: easy       Dentition: Intact and Unchanged

## 2021-08-11 NOTE — PLAN OF CARE
DATE & TIME: 8/10/21, 0129 - 6215   Cognitive Concerns/ Orientation : A&O x 4   BEHAVIOR & AGGRESSION TOOL COLOR: Green  CIWA SCORE: NA   ABNL VS/O2: BP elevated  MOBILITY: SBA  PAIN MANAGMENT: Managed with Prn IV Dilaudid  DIET: NPO  BOWEL/BLADDER: Continent  ABNL LAB/BG: NA  DRAIN/DEVICES: NG tube on LIS. PIV infusing at 50 ml/hr  TELEMETRY RHYTHM: NA  SKIN: Pale, surgical incisions x 2 along right inner thigh. Distal wound healing with scabs, proximal wound with dressing in place. CDI  TESTS/PROCEDURES: For abdominal X ray  D/C DATE: Pending improvement  OTHER IMPORTANT INFO: Small emesis this Am. Prn Zofran given. WOC, GI, Nutrition and Vascular consulted

## 2021-08-11 NOTE — PROGRESS NOTES
[facility]    Hospitalist Progress Note    Assessment & Plan   Shirley Hendricks is a 60 year old female who was admitted on 8/10/2021.     Past medical history significant for hypertension, hyperlipidemia, COPD, CAD, SVT asymptomatic, PAD status post recent surgical revascularization of the right lower extremity in July 2021 for critical ischemia in extremity with history of revascularization, cholecystectomy, salpingectomy  admitted on 8/10/2021 with abdominal pain and small bowel obstruction.    Closed loop small bowel obstruction secondary to omental adhesions  Abd CT scan showed multiple dilated fluid-filled loops of mid small bowel in the lower abdomen have an appearance suspicious for a closed loop small bowel obstruction, an enlarged right common iliac chain lymph node and multiple enlarged bilateral inguinal lymph nodes have increased in size, and are indeterminate.  -General surgery consultation appreciated:   --S/p exploratory laparoscopy with laparoscopic lysis of adhesions converted to laparotomy with extensive lysis of adhesions (8/11).   --Abd Xray completed in PACU.     --NG tube to LIS.     --Alvarez to remain in place until tomorrow.     --Hold Plavix as this time.    -NPO except for medications  -IV fluids for insensible losses  -Antiemetics and analgesia  -Check urinalysis    Right medial thigh wound  Failed to heal surgical wound from right lower extremity revascularization.    *Notably seen at [facility] by Dr. Montero 8/3/21 when she presented due to partial dehiscence and fluctuant area on the left shoulder surgical site with fluctuant area.     --At that visit; thigh wound with noted dehiscence and some serous drainage but no purulence and was sutured.  Left shoulder fluctuant area with small amount of purulence (abscess) that was compressed/expressed and no need for systemic antibiotics.    -WOCN consult appreciated:   --Noted purulent liquid drainage upon  assessment morning of 8/11.     --Recommending Vascular surgery consult.    - Vascular surgery consult will be requested once patient is back from PACU.    - Will start IV Vanco/Zosyn.      PAD   Status post above-the-knee popliteal to below the knee popliteal artery bypass using left upper extremity translocated cephalic vein for critical limb ischemia of extremity with history of revascularization on 7/6/2021 with [place] vascular surgery. She has been on dual antiplatelet therapy since then. I refer the interested reader to [facility] consultation note from 7/7/2021 for additional details of her PAD.  -Initially planned to resume PTA ASA and Plavix.    --Vascular med consultation requested by admitting Hospitalist.     --Per General Surgery rec after surgery will be holding Plavix.      Possible protein calorie malnutrition   Unintentional 26 pound weight loss in the last 8 months and following recent vascular surgery and poor wound healing of the right lower extremity  -Consult dietitian    Hypertension  BP in the urgency ranges 220/98 at home, PTA regimen includes lisinopril 20 mg BID and Coreg 6.25 mg BID.  Patient was also on  amlodipine 5mg BID from 4/8/21-7/7/21, seems to have been stopped sometime in between.  -We will continue PTA lisinopril and coreg with hold parameters   --If BPs consistently running high can resume amlodipine  -PRN labetalol and hydralazine for SBP sustained greater than 180 mmHg    Hyperlipidemia  -Resume PTA Crestor    CAD diffuse, moderate, nonobstructive   by 2009 coronary angiogram.  -Antihypertensive and lipid lower agents as above.    -Dual antiplatelet therapy as noted elsewhere    Hyponatremia (Resolved)  Mild, asymptomatic, will check prior levels to determine baseline.  -NS IV fluids for insensible losses while NPO    Likely COPD   Severity unknown, last seen at [facility] for lung science in 2014 for lung cancer screening but not felt to actually have COPD at that  time. Although she has continued to smoke since then. PFTs prior to that were inconclusive and she has had known mediastinal and axillary lymphadenopathy.  -Continue PTA inhalers    Tobacco use   Attempts to quit have thus far been unsuccessful but she is ready to try again.   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  No interventions.      Pulmonary nodule  Indeterminate 0.4 cm right lower lobe pulmonary nodule is new  since the previous exam. Given patient's emotional lability we did not specifically discuss this.  -Outpatient follow-up in 12 months to reevaluate pulmonary nodule with [facility] pulmonology    Covid-19 screening  PCR is negative on 8/10/2021, vaccination status not discussed    History of SVT asymptomatic  Seen on zio patch as work up for lightheadedness  -Low threshold to start telemetry if any symptoms      Clinically Significant Risk Factors Present on Admission         # Hyponatremia: Na = 131 mmol/L (Ref range: 133 - 144 mmol/L) on admission, will monitor as appropriate      # Platelet Defect: home medication list includes an antiplatelet medication        Diet: NPO for Medical/Clinical Reasons Except for: Meds     DVT Prophylaxis: Pneumatic Compression Devices   Alvarez Catheter: Not present  Code Status: Full Code     Disposition Plan    Expected discharge: 08/15/2021 recommended to TBD once adequate pain management/ tolerating PO medications, antibiotic plan established and safe disposition plan/ TCU bed available.   Entered: Kavin Coulter PA-C 08/11/2021, 4:03 PM        The patient's care was discussed with the Bedside Nurse, Patient and PACU nurse.      The patient has been discussed with Dr. Sandoval, who agrees with the assessment and plan at this time.    Kavin Coulter PA-C  [facility]  Securely message with the Vocera Web Console (learn more here)  Text page via  [facility] Paging/Directory      Interval History   PAtient was down in the Pre-op/OR/PACU area for majority of the day.  She was evaluated in the PACU this afternoon.      Patient was seen after abdominal Xray was obtained in the PACU.  She was groggy from surgery but remained awake and alert throughout exam and with questions.  She denied fever, chills, chest pain or shortness of breath.  She was having abdominal pain all morning.      I briefly reviewed the surgery she went through.  I also discussed WOCN assessment of right thigh with purulent drainage and their recommendations for Vascular surgery consult.      Patient was asked if she has been told she has a murmur and she says she has but it seems to come and go,      -Data reviewed today: I reviewed all new labs and imaging results over the last 24 hours. I personally reviewed no images or EKG's today.    Physical Exam   Temp: 98.7  F (37.1  C) Temp src: Oral BP: 116/66 Pulse: 72   Resp: 18 SpO2: 94 % O2 Device: None (Room air)    Vitals:    08/10/21 0809 08/10/21 1414   Weight: 49.4 kg (109 lb) 45.4 kg (100 lb 1.4 oz)     Vital Signs with Ranges  Temp:  [97.4  F (36.3  C)-98.7  F (37.1  C)] 98.7  F (37.1  C)  Pulse:  [58-88] 72  Resp:  [16-18] 18  BP: (116-213)/(66-98) 116/66  SpO2:  [90 %-100 %] 94 %  I/O last 3 completed shifts:  In: -   Out: 300 [Emesis/NG output:300]      Constitutional: Awake but groggy, alert, cooperative, no apparent distress.    ENT: Normocephalic, without obvious abnormality, atraumatic, oral pharynx with dry mucus membranes, tonsils without erythema or exudates.  Neck: Supple, symmetrical, trachea midline, no adenopathy.  Pulmonary: No increased work of breathing, good air exchange, clear to auscultation bilaterally, no crackles or wheezing.  Cardiovascular: Regular rate and rhythm, normal S1 and S2, no S3 or S4, and 2/6 murmur noted.  GI: Abdominal binder in place and due to post-op state deferred full abdominal exam.     Skin/Integumen: Surgical site of left shoulder appeared to be healing without notably erythema or dehiscence.  Right thigh wound checked and had a bandage that was pulled back and revealed erythema around the site with slightly purulent drainage noted on the bandage.    Neuro: CN II-XII grossly intact.  Psych:  Alert and oriented x 3. Normal affect.  Extremities: No lower extremity edema noted, and calves are non-TTP bilaterally.       Medications     sodium chloride 50 mL/hr at 08/10/21 1425       aspirin  81 mg Oral Daily     carvedilol  6.25 mg Oral BID     clopidogrel  75 mg Oral Daily     famotidine  20 mg Intravenous Q12H     fluticasone-vilanterol  1 puff Inhalation Daily     lisinopril  20 mg Oral BID     nicotine  1 patch Transdermal Daily     nicotine   Transdermal Q8H     rosuvastatin  40 mg Oral At Bedtime     sodium chloride (PF)  3 mL Intracatheter Q8H       Data   Recent Labs   Lab 08/10/21  1452 08/10/21  0833   WBC  --  8.3   HGB  --  12.1   MCV  --  89   PLT  --  198   NA  --  131*   POTASSIUM  --  4.1   CHLORIDE  --  100   CO2  --  26   BUN  --  12   CR 0.62 0.65   ANIONGAP  --  5   SONIA  --  9.5   GLC  --  94   ALBUMIN  --  3.6   PROTTOTAL  --  7.7   BILITOTAL  --  0.5   ALKPHOS  --  70   ALT  --  24   AST  --  19   LIPASE  --  132       Recent Results (from the past 24 hour(s))   CT Abdomen Pelvis w/o Contrast    Narrative    CT ABDOMEN AND PELVIS WITHOUT CONTRAST 8/10/2021 9:20 AM    CLINICAL HISTORY: Abdominal pain.  TECHNIQUE: CT scan of the abdomen and pelvis was performed without IV  contrast. Multiplanar reformats were obtained. Dose reduction  techniques were used.  CONTRAST: None.  COMPARISON: CT of the abdomen and pelvis performed 6/10/2020.    FINDINGS:   LOWER CHEST: Small calcified granuloma in the right lower lobe of the  lung. An indeterminate 0.4 cm pulmonary nodule in the right lower lobe  laterally (series 3 image 11) was not visualized on the previous exam.  The visualized  lung bases are otherwise clear. Small hiatal hernia.  Coronary artery calcification.    HEPATOBILIARY: Prior cholecystectomy. No focal hepatic lesions are  seen.    PANCREAS: Normal.    SPLEEN: Normal.    ADRENAL GLANDS: Normal.    KIDNEYS/BLADDER: Mild right hydronephrosis is new since the previous  exam, with no definite cause identified.    BOWEL: There are multiple dilated fluid-filled loops of mid small  bowel in the lower abdomen, with mild associated mesenteric edema,  with an appearance suspicious for a closed loop small bowel  obstruction. A probable transition point is noted in the midabdomen  right of midline (series 4 image 26). No convincing evidence for  colitis or diverticulitis. The appendix is not clearly identified on  this exam, but there is no convincing inflammatory change in the  expected region of the appendix.    PELVIC ORGANS: Unremarkable.    LYMPH NODES: An enlarged right common iliac chain lymph node (series 3  image 110) measures 2.3 x 1.8 cm, and has increased in size. A few  mildly enlarged bilateral inguinal lymph nodes have also increased in  size.    VASCULATURE: Advanced atherosclerotic aortoiliac calcification.  Postoperative changes of aortofemoral bypass graft.    ADDITIONAL FINDINGS: None.    MUSCULOSKELETAL: Unremarkable.      Impression    IMPRESSION:   1. Multiple dilated fluid-filled loops of mid small bowel in the lower  abdomen have an appearance suspicious for a closed loop small bowel  obstruction.  2. An enlarged right common iliac chain lymph node and multiple  enlarged bilateral inguinal lymph nodes have increased in size, and  are indeterminate.  3. Indeterminate 0.4 cm right lower lobe pulmonary nodule is new since  the previous exam. Please refer to pulmonary nodule follow-up  guidelines below.    Recommendations for one or multiple incidental lung nodules < 6mm :    Low risk patients: No routine follow-up.    High risk patients: Optional follow-up CT at 12  months; if  unchanged, no further follow-up.    *Low Risk: Minimal or absent history of smoking or other known risk  factors.  *Nonsolid (ground glass) or partly solid nodules may require longer  follow-up to exclude indolent adenocarcinoma.  *Recommendations based on Guidelines for the Management of Incidental  Pulmonary Nodules Detected at CT: From the Fleischner Society 2017,  Radiology 2017.  *Guidelines apply to incidental nodules in patients who are 35 years  or older.  *Guidelines do not apply to lung cancer screening, patients with  immunosuppression, or patients with known primary cancer.    MOISÉS TRIVEDI MD         SYSTEM ID:  [id number]

## 2021-08-11 NOTE — ANESTHESIA PREPROCEDURE EVALUATION
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 Simons dermatology     Embolism and thrombosis of unspecified artery (H) 2000    Protein C,S, Leiden FV, Lupus Inhibitor Negative     Gastroesophageal reflux disease      Hyperlipidaemia      Hypertension      Lupus (H)     skin     Mild major depression (H) 2017     Myocardial infarction (H)     x3     Osteoarthrosis, unspecified whether generalized or localized, unspecified site      PAD (peripheral artery disease) (H)      Peripheral vascular disease, unspecified (H) 2000    s/p angioplasty with stent right femoral a.; Right iliac and femoral a. clot     Post-menopausal      Reflux esophagitis 2004    EGD: esophagitis and medium HH     SVT (supraventricular tachycardia) (H)      Thrombocytopenia (H)      Uncomplicated asthma      Vitamin C deficiency 2018      Past Surgical History:   Procedure Laterality Date     ANGIOGRAM       ANGIOGRAM Right 2021    Procedure: RIGHT LOWER EXTREMITY ANGIOGRAM WITH LEFT BRACHIAL ARTERY CUTDOWN;  Surgeon: José Luis Hernandez MD;  Location: [facility] OR     BYPASS GRAFT FEMOROPOPLITEAL Right 2020    Procedure: RIGHT FEMORAL GRAFT TO ABOVE-KNEE POPLITEAL BYPASS USING CADAVERIC SUPERFICIAL FEMORAL ARTERY;  Surgeon: Chadwick Lozano MD;  Location: [facility] OR     BYPASS GRAFT INSITU FEMOROPOPLITEAL Right 2021    Procedure: CREATION RIGHT FEMORAL TO POPLITEAL ARTERIAL BYPASS USING INSITU VEIN GRAFT;   Surgeon: José Luis Hernandez MD;  Location:  OR     C FABRIC WRAPPING OF ABDOMINAL ANEURYSM       CARDIAC CATHERIZATION  09/03/2009    multivessel CAD without target lesions, med mgmt indicated, preserved ef     CARPAL TUNNEL RELEASE RT/LT Right 05/20/2021     ENDARTERECTOMY CAROTID Right 05/11/2016    Procedure: ENDARTERECTOMY CAROTID;  Surgeon: Chadwick Lozano MD;  Location:  OR     ENDARTERECTOMY CAROTID Left 06/08/2020    Procedure: LEFT CAROTID ENDARTERECTOMY with distal facal vein patch  and EEG;  Surgeon: Chadwick Lozano MD;  Location:  OR     GYN SURGERY  left tube    left salpingectomy     IR LOWER EXTREMITY ANGIOGRAM RIGHT  05/25/2021     IR OR ANGIOGRAM  6/23/2021     LAPAROSCOPIC CHOLECYSTECTOMY N/A 09/27/2017    Procedure: LAPAROSCOPIC CHOLECYSTECTOMY;  LAPAROSCOPIC CHOLECYSTECTOMY;  Surgeon: Jacoby Tapia MD;  Location: [facility]     ORTHOPEDIC SURGERY      left knee surgery     VASCULAR SURGERY  aoto bi fem  left fem-AK pop     [facility] NONSPECIFIC PROCEDURE  12/2000    angioplasty with stent right fem. a.     [facility] NONSPECIFIC PROCEDURE  1987    sinus surgery     [facility] NONSPECIFIC PROCEDURE  09/02/2009    Emergent left groin exploration with oversewing of bleeding angiographic site. 2. Endarterectomy of common femoral-proximal superficial femoral artery with greater saphenous vein patch angioplasty.   a. [place] of accessory right greater saphenous vein.      [facility] NONSPECIFIC PROCEDURE  08/27/2009    occluded left common iliac and external iliac arteries were successfully revascularized with stenting to 8 and 7 mm       Allergies   Allergen Reactions     Contrast Dye Anaphylaxis     RASH, FACIAL AND NECK SWELLING, SOB, WHEEZING     Pantoprazole      Protonix caused diffuse edema     Chantix [Varenicline]      Terrible dreams     Penicillins Itching      Social History     Tobacco Use     Smoking status: Former Smoker     Packs/day: 1.00     Years: 52.00     Pack years: 52.00      Types: Cigarettes     Quit date: 8/3/2021     Years since quittin.0     Smokeless tobacco: Never Used     Tobacco comment:  PPD   Substance Use Topics     Alcohol use: Not Currently     Comment: x1-2 yr      Wt Readings from Last 1 Encounters:   21 47 kg (103 lb 9.9 oz)      Stress echo 2021  Interpretation Summary     Suboptimal stress test due to inadequate maximum heart rate.  Normal left ventricular function and wall motion at rest and post-stress but  LV size and function were unchanged following limited exercise of 3 minutes.  Post-exercise images were obtained with the heart rate in the 70's bpm.  Blood pressure seth appropriately from 140/78 mmHg (supine, baseline) to  158/80 mmHg during Stage I and fell to 134/74 mmHg in recovery. Consider  Lexiscan stress imaging (nuclear, MRI) for adequate testing.  ______________________________________________________________________________  Stress  Exercise was stopped due to fatigue.  There was a normal BP response to exercise.  Target Heart Rate was not achieved due to fatigue.  Suboptimal stress test due to inadequate maximum heart rate.  J point/ST elevation at rest pseudonormalized with exercise.  There was no arrhythmia.  The Duke treadmill score was intermediate risk ( -11< Sterling score <5).  Normal left ventricular function and wall motion at rest and post-stress.  The visual ejection fraction is estimated at 60-65%.     Baseline  The patient is in normal sinus rhythm.  Baseline ECG displays Q waves in the mike-septal leads.  Elevated J point V3-V6, inferior leads - most C/W early repolarization.  Normal left ventricular function and wall motion at rest and post-stress.  The visual ejection fraction is estimated at 60-65%.    EKG  2021 06:39:11 [facility] ROUTINE RECORD Sinus bradycardia Possible Anteroseptal infarct (cited on or before 03-SEP-2009) Abnormal ECG When compared with ECG of 10-LUPE-2020 13:33, No significant  change was foun    Lexiscan nuclear study - 2/2020  The nuclear stress test is abnormal.     There is nontransmural infarction in the mid to distal anterolateral segment(s) of the left ventricle associated with a mild-moderate degree of lydia-infarct ischemia.     Left ventricular function is normal.     The left ventricular ejection fraction at stress is 65%.     There is no prior study for comparison    CT abdomen  IMPRESSION:   1. Multiple dilated fluid-filled loops of mid small bowel in the lower  abdomen have an appearance suspicious for a closed loop small bowel  obstruction.  2. An enlarged right common iliac chain lymph node and multiple  enlarged bilateral inguinal lymph nodes have increased in size, and  are indeterminate.  3. Indeterminate 0.4 cm right lower lobe pulmonary nodule is new since  the previous exam. Please refer to pulmonary nodule follow-up  guidelines below.  Anesthesia Evaluation   Pt has had prior anesthetic.     No history of anesthetic complications       ROS/MED HX  ENT/Pulmonary:     (+) tobacco use (quit about a week ago after 52 pack year  hx ), asthma COPD,  (-) sleep apnea   Neurologic:     (+) TIA, date: years ago,     Cardiovascular: Comment: S/p fem-distal bypass July 2021    (+) hypertension-Peripheral Vascular Disease (s/p Aortobifemoral bypass)-- Symptomatic. CAD -past MI (MI x 3) --dysrhythmias (hx of SVT), Previous cardiac testing   Echo: Date: Results:    Stress Test: Date: 2020 Results:    The nuclear stress test is abnormal.    There is nontransmural infarction in the mid to distal anterolateral segment(s) of the left ventricle associated with a mild-moderate degree of lydia-infarct ischemia.    Left ventricular function is normal.    The left ventricular ejection fraction at stress is 65%.     There is no prior study for comparison  ECG Reviewed: Date: Results:    Cath:  Date: Results:   (-) CHF and orthopnea/PND   METS/Exercise Tolerance:     Hematologic: Comments:  tkhrombocytopenia    (+) History of blood clots (arterial), pt is anticoagulated,     Musculoskeletal: Comment: Cutaneous lupus  Charcot - Breonna Tooth disease  Non-healing wound lower extremity      GI/Hepatic: Comment: Small bowel obstruction  Nasogastric tube in place    (+) GERD,     Renal/Genitourinary:  - neg Renal ROS     Endo:    (-) Type II DM and thyroid disease   Psychiatric/Substance Use:     (+) psychiatric history anxiety and depression     Infectious Disease:  - neg infectious disease ROS     Malignancy:  - neg malignancy ROS     Other:            Physical Exam    Airway  airway exam normal      Mallampati: II   TM distance: > 3 FB   Neck ROM: full   Mouth opening: > 3 cm    Respiratory Devices and Support         Dental  no notable dental history     (+) lower dentures and upper dentures      Cardiovascular          Rhythm and rate: regular and normal   (+) murmur       Pulmonary   pulmonary exam normal        breath sounds clear to auscultation           OUTSIDE LABS:  CBC:   Lab Results   Component Value Date    WBC 11.3 (H) 08/11/2021    WBC 8.3 08/10/2021    HGB 12.0 08/11/2021    HGB 12.1 08/10/2021    HCT 36.8 08/11/2021    HCT 36.8 08/10/2021     08/11/2021     08/10/2021     BMP:   Lab Results   Component Value Date     08/11/2021     (L) 08/10/2021    POTASSIUM 4.2 08/11/2021    POTASSIUM 4.1 08/10/2021    CHLORIDE 104 08/11/2021    CHLORIDE 100 08/10/2021    CO2 21 08/11/2021    CO2 26 08/10/2021    BUN 16 08/11/2021    BUN 12 08/10/2021    CR 0.76 08/11/2021    CR 0.62 08/10/2021    GLC 95 08/11/2021    GLC 94 08/10/2021     COAGS:   Lab Results   Component Value Date    PTT 25 04/21/2016    INR 1.01 09/24/2020     POC:   Lab Results   Component Value Date    BGM 87 07/08/2021     HEPATIC:   Lab Results   Component Value Date    ALBUMIN 3.6 08/10/2021    PROTTOTAL 7.7 08/10/2021    ALT 24 08/10/2021    AST 19 08/10/2021    ALKPHOS 70 08/10/2021    BILITOTAL 0.5  08/10/2021     OTHER:   Lab Results   Component Value Date    PH 7.42 03/19/2010    LACT 1.1 08/10/2021    A1C 5.4 09/23/2020    SONIA 8.9 08/11/2021    PHOS 4.0 07/09/2021    MAG 1.8 08/11/2021    LIPASE 132 08/10/2021    AMYLASE 46 08/14/2017    TSH 0.77 01/05/2021    T4 1.19 03/28/2017    CRP <2.9 09/18/2014    SED 15 01/05/2021       Anesthesia Plan    ASA Status:  3, emergent    NPO Status:  NPO Appropriate    Anesthesia Type: General.     - Airway: ETT   Induction: RSI.   Maintenance: Balanced.   Techniques and Equipment:     - Airway: Video-Laryngoscope         Consents    Anesthesia Plan(s) and associated risks, benefits, and realistic alternatives discussed. Questions answered and patient/representative(s) expressed understanding.     - Discussed with:  Patient         Postoperative Care    Pain management: IV analgesics.   PONV prophylaxis: Ondansetron (or other 5HT-3)     Comments:    Did discuss possible TAP blocks post procedure pending pain control/ size of surgical incision            Jacoby Swann MD

## 2021-08-11 NOTE — CONSULTS
[facility] Nurse Inpatient Wound Assessment   Reason for consultation: Evaluate and treat  Right medial thigh wounds    Assessment  Right medial thigh wounds due to Surgical Wound  Status: initial assessment and failed to heal  Purulent liquid drainage expressed from medial opening distal wound is 100% eschar and the proximal wound is 100% stable eschar.  Unable to fully assess the depth of the wound due to be taken to surgery in the middle of the visit.   Recommend vascular consult  Treatment Plan  Right upper medial thigh open wounds: Daily    Irrigate the wound with wound cleanser on stream setting  Fill the wound with mesalt packing strip #[id number]  Leave a 2 inch tail and tape to skin  Cover with mepilex 4x4 or optifoam gentle 4x4     Orders Written  Recommended provider order: Vascular consult  WO Nurse follow-up plan:weekly  Nursing to notify the Provider(s) and re-consult the WOC Nurse if wound(s) deteriorates or new skin concern.    Patient History  According to provider note(s):  Shirley Hendricks is a 60 year old female with past medical history of  hypertension, hyperlipidemia, COPD, CAD, SVT asymptomatic, PAD status post recent surgical revascularization of the right lower extremity in July 2021 for critical ischemia in extremity with history of revascularization, cholecystectomy, salpingectomy  admitted on 8/10/2021 with abdominal pain and small bowel obstruction    Objective Data  Containment of urine/stool: Continent of bladder and Continent of bowel    Active Diet Order  Orders Placed This Encounter      NPO for Medical/Clinical Reasons Except for: Meds      Output:   I/O last 3 completed shifts:  In: 810 [I.V.:810]  Out: 350 [Emesis/NG output:350]    Risk Assessment:   Sensory Perception: 4-->no impairment  Moisture: 4-->rarely moist  Activity: 3-->walks occasionally  Mobility: 3-->slightly limited  Nutrition: 2-->probably inadequate  Friction and Shear: 3-->no apparent problem  Cesar Score: 19                           Labs:   Recent Labs   Lab 08/11/21  0821 08/10/21  0833   ALBUMIN  --  3.6   HGB 12.0 12.1   WBC 11.3* 8.3       Physical Exam  Areas of skin assessed: focused right medial upper thigh surgical site     Wound Location:  Right upper medial thigh surgical site  Date of last photo none taken   Wound History: bypass site    Wound Base: 100 % non-granular tissue     Palpation of the wound bed: boggy      Drainage: moderate     Description of drainage: tan and tan liquid      Measurements (length x width x depth, in cm) 0.8  x 0.3  x  utd cm      Tunneling up to expect this wound to tunnel      Undermining expect this wound to be undermined   Periwound skin: indurated, edges very thick and rolled      Color: pale      Temperature: warm  Odor: none  Pain: no grimacing or signs of discomfort, none she was focused on her headache      Interventions  Visual inspection and assessment incomplete   Wound Care Rationale Provide selective debridement (autolysis) of nonviable tissue , Gently fill dead space to prevent abscess formation  and Decrease bacterial load  Wound Care: needs to be fully examined to determine the depth and undermining  Supplies: placed at the bedside and floor stock  Current off-loading measures: Pillows  Current support surface: Standard  Atmos Air mattress  Education provided to: Not appropriate today   Did not get a chance to discuss the POC    Elizabeth Manrique RN BS CWOCN

## 2021-08-11 NOTE — OP NOTE
Procedure Date: 08/11/2021    STAFF SURGEON:  Corina Ferreira MD    ASSISTANT:  DARREL Slater.    PREOPERATIVE DIAGNOSIS:  Small-bowel obstruction.    POSTOPERATIVE DIAGNOSIS:  Closed loop small-bowel obstruction.    PROCEDURE PERFORMED:    1.  Exploratory laparoscopy with laparoscopic lysis of adhesions.  2.  Limited midline laparotomy with open lysis of adhesions.    INDICATIONS FOR PROCEDURE:  Shirley is a 60-year-old female who presented with abdominal pain.  CT scan was completed and demonstrated findings consistent with small-bowel obstruction.  The patient was treated with NG tube decompression and monitored overnight.  Her abdominal pain was not improving with conservative measures.  The decision was made for the patient to proceed to the operating room for exploratory laparoscopy, possible laparotomy, possible bowel resection.  The risks and benefits of the procedure were discussed with the patient ,and consent for the procedure was obtained.    ANESTHESIA:  General.    DESCRIPTION OF PROCEDURE:  The patient was brought to the preoperative area, and preoperative antibiotics were administered.  The patient was brought back to the operating room and placed in supine position on the operating table.  A timeout was completed.  Anesthesia was induced, and the patient was intubated.  The patient was secured to the operating table, and her pressure points were padded.  The patient's abdomen was prepped and draped in the sterile fashion.  Following infiltration with local anesthetic, the #11 blade scalpel was used to make a small incision overlying the patient's left upper quadrant.  Entrance into the abdominal cavity was then completed under direct visualization using the 5 mm Visiport and the 5 mm laparoscope.  When the patient's abdomen been safely entered, it was fully insufflated.  Laparoscope was reinserted into the abdominal cavity, and initial inspection revealed no evidence of injury at the port entry  site.  Under direct visualization, 2 additional 5 mm laparoscopic ports were placed, one at the left mid abdomen and one in the left lower quadrant.  The patient was found to have evidence of significant omental adhesions to the anterior abdominal wall.  These were carefully taken down utilizing the LigaSure device.  We then identified a hyperemic loop of small bowel that appeared to be protruding through a tunnel of adhesions involving the omentum and surrounding small bowel, thus resulting in a closed loop bowel obstruction.  Attempts were made to lyse the patient's adhesions utilizing the LigaSure laparoscopically.  However, the involved adhesions were too dense and extensive.  I then performed a limited midline incision utilizing the electrocautery and working through the patient's previous midline incision in the periumbilical area.  The midline fascia was incised utilizing the electrocautery, and the peritoneum was incised with a finger within the abdomen to protect the intraabdominal contents from harm.  An Benedicto wound retractor was then placed through our incision.  We then spent approximately 30 minutes lysing adhesions between the omentum and small bowel and between adjacent loops of small bowel.  The adhesions were lysed utilizing the electrocautery, the LigaSure, and the Metzenbaum scissors.  When the adhesions had been lysed, we were able to then release the band that was resulting in a closed loop small-bowel obstruction.  We were then able to run the small bowel from the terminal ileum up towards the ligament of Treitz.  No other significant adhesions were encountered.  The hyperemic loop of small bowel that was involved in the closed loop obstruction appeared viable and was readily contractile.  There was no need to proceed with bowel resection.  The small bowel was returned to its anatomic position.  The omentum was placed over the top of the small bowel.  A thorough abdominal cavity sweep was  completed, and no retained foreign objects were encountered.  The patient's midline abdominal fascia was then reapproximated using 2 running 0 PDS sutures.  The deep dermal tissues in the midline incision were then reapproximated using interrupted 3-0 Vicryl sutures.  The skin of the incision was reapproximated using a running 4-0 Monocryl subcuticular stitch.  The 3 port sites were also reapproximated using 4-0 Monocryl subcuticular stitches.  The incisions were washed and dried, and skin glue was applied.  The lap, needle and instrument counts were correct at the end of the procedure.  The patient tolerated the procedure well and was extubated and taken to the recovery area in stable condition.    Krissy Flower PA-C assisted in the above procedure. They provided assistance with pre-operative positioning, prepping, and draping of the patient. The assistant provided vital operative assistance with retraction using instruments thus providing the necessary exposure and visualization for the case, manipulation of tissues to achieve hemostasis, and assisted in closure of the wound. Post-operatively they assisted in transfer of the patient off the operative table and transition to the PACU physicians.      ESTIMATED BLOOD LOSS:  5 mL.    FINDINGS:  Closed loop small-bowel obstruction secondary to extensive omental adhesions to the small-bowel mesentery, and significant interloop small bowel adhesions.  Involved small-bowel loop hyperemic, but viable.  Lysis of adhesions completed laparoscopically and through a limited midline incision.    COMPLICATIONS:  None.    SPECIMENS:  None.    DRAINS:  None.    CONDITION:  The patient will be discharged home directly from the Postanesthesia Care Unit with instructions for postoperative cares and medications for pain management.    Corina Ferreira MD        D: 08/11/2021   T: 08/11/2021   MT: [facility]    Name:     KASIA WAN  MRN:      [id number]        Account:         [id number]   :      1960           Procedure Date: 2021     Document: [id number]

## 2021-08-11 NOTE — PLAN OF CARE
Cognitive Concerns/ Orientation : A&Ox4. Flat and withdrawn d/t discomfort and pain. Anxious and frustrated at times.    BEHAVIOR & AGGRESSION TOOL COLOR: Green   CIWA SCORE: NA    ABNL VS/O2: BP in the 180s-200's. On RA. IV Hydralazine x2 used as pt has been too nauseated and emesis x3-4.   MOBILITY: Moving in bed. SBA with ambulation.  PAIN MANAGMENT: Dilaudid 2mg IV Q2hr as needed. Pain 8-10/10. IV Zofran and Compazine.   DIET: NPO with NG tube in place set at intermittent low suction. Small amount of output.   BOWEL/BLADDER: Emesis X 3-4. No BM this shift.   ABNL LAB/BG: Na 131, Covid-19 negative.   DRAIN/DEVICES: NG tube in place. PIV in RUE infusing NS at 50mL/hr.   TELEMETRY RHYTHM: NA   SKIN: Pale, 2 surgical incisions along R inner thigh. Distal one healed with scabbing. Proximal one with edges not approximated with small amount of drainage. New dressing applied.   TESTS/PROCEDURES: AM labs. CT of abdomin completed.   D/C DAY/GOALS/PLACE: Pending improvement.   OTHER IMPORTANT INFO: GI, Vascular, WOC, and nutrition consulted. Bed alarm on for safety. Rounded on freq.

## 2021-08-11 NOTE — PROGRESS NOTES
[facility]    General Surgery  Short Progress Note    Shirley Hendricks underwent exploratory laparoscopy with laparoscopic lysis of adhesions converted to laparotomy with extensive lysis of adhesions for closed-loop small bowel obstruction secondary to omental adhesions. Unable to confirm location of NG intraoperatively, abdomen XR ordered for PACU. Will continue IV fluids, NG to LIS, analgesia, and GI prophylaxis. Keep leger in place for tonight, likely remove tomorrow. Labs in the morning, continue to hold Plavix at this time. General Surgery will continue to follow.    Krissy Flower PA-C

## 2021-08-11 NOTE — CONSULTS
CLINICAL NUTRITION SERVICES  -  ASSESSMENT NOTE    Future/Additional Recommendations:   - Supplements once able    Malnutrition: (8/11)   % Weight Loss:  Up to 10% in 6 months (non-severe malnutrition)  % Intake:  </= 50% for >/= 5 days (severe malnutrition)  Subcutaneous Fat Loss:  Orbital region mild depletion and Upper arm region moderate depletion  Muscle Loss:  Temporal region mild depletion, Clavicle bone region moderate depletion, Acromion bone region moderate depletion, Scapular bone region moderate depletion and Dorsal hand region moderate depletion  Fluid Retention:  None noted    Malnutrition Diagnosis: Severe malnutrition  In Context of:  Acute illness or injury     REASON FOR ASSESSMENT  Shirley Hendricks is a 60 year old female seen by Registered Dietitian for Provider Order - Unintentional weight loss, concern for protein calorie malnutrition and poor wound healing    NUTRITION HISTORY  - Information obtained from chart review and patient report   - H/o HLD, HTN, COPD, MI. Presented with abdominal pain and nausea r/t small bowel obstruction.   - Reported unintentional 26# loss in 8 months. Pt has poor wound healing to right LE. Pt states that she noticed her wound on July 7.     - She has not been able to maintain a consistent diet due to several surgeries over the past few months. Chart indicates a recent right LE revascularization and history of cholecystectomy and salpingectomy.   - Likes Kennedy. Occasionally drinks other protein supplements as well.   - NKFA    CURRENT NUTRITION ORDERS  Diet Order:     NPO     Current Intake/Tolerance:  NG in place.     NUTRITION FOCUSED PHYSICAL ASSESSMENT FOR DIAGNOSING MALNUTRITION)  Yes         Observed:    Muscle wasting (refer to documentation in Malnutrition section) and Subcutaneous fat loss (refer to documentation in Malnutrition section)    Obtained from Chart/Interdisciplinary Team:  NG in place for decompression   Cesar - Nutrition 2; Total  "19    ANTHROPOMETRICS  Height: 5' 2\"  Weight: 103 lbs 9.86 oz (47 kg)   Body mass index is 18.95 kg/m .  Weight Status:  Normal BMI  IBW: 50 kg   % IBW: 94%  Weight History: 12# weight loss in the past 8 months. 10.4%.  Pt reports a UBW of up to 135-138#, but couldn't recall when she last weighed that.   Wt Readings from Last 20 Encounters:   08/11/21 47 kg (103 lb 9.9 oz)   07/07/21 48.1 kg (106 lb)   06/23/21 49.1 kg (108 lb 3 oz)   06/21/21 49.4 kg (109 lb)   05/25/21 48.8 kg (107 lb 8 oz)   05/14/21 49.9 kg (110 lb)   05/13/21 50.3 kg (111 lb)   04/08/21 50.8 kg (112 lb 1.6 oz)   04/05/21 49.6 kg (109 lb 6.4 oz)   03/16/21 51.9 kg (114 lb 6.4 oz)   01/05/21 52.2 kg (115 lb)   09/25/20 55.3 kg (121 lb 14.6 oz)   09/14/20 53.1 kg (117 lb)       LABS  Labs reviewed    MEDICATIONS  Medications reviewed  NaCl IVF @ 50 mL/hr    ASSESSED NUTRITION NEEDS PER APPROVED PRACTICE GUIDELINES:  Dosing Weight 47 kg   Estimated Energy Needs: 2504-3779 kcals (30-35 Kcal/Kg)  Justification: repletion, planned surgery  Estimated Protein Needs: 58-85 grams protein (1.2-1.8 g pro/Kg)  Justification: Repletion and planned surgery   Estimated Fluid Needs: 1 mL/kcal  Justification: maintenance    MALNUTRITION:  % Weight Loss:  Up to 10% in 6 months (non-severe malnutrition)  % Intake:  </= 50% for >/= 5 days (severe malnutrition)  Subcutaneous Fat Loss:  Orbital region mild depletion and Upper arm region moderate depletion  Muscle Loss:  Temporal region mild depletion, Clavicle bone region moderate depletion, Acromion bone region moderate depletion, Scapular bone region moderate depletion and Dorsal hand region moderate depletion  Fluid Retention:  None noted    Malnutrition Diagnosis: Severe malnutrition  In Context of:  Acute illness or injury    NUTRITION DIAGNOSIS:  Inadequate oral intake related to reduced appetite, several recent surgeries as evidenced by weight loss of up to 10.4% in 8 months or less.       NUTRITION " INTERVENTIONS  Recommendations / Nutrition Prescription  Diet advancement once able  Supplements per pt request       Implementation  Nutrition education: Per Provider order if indicated       Nutrition Goals  Diet >CLD In the next 24-48 hours.       MONITORING AND EVALUATION:  Progress towards goals will be monitored and evaluated per protocol and Practice Guidelines    Cordelia Fam RD, LD

## 2021-08-11 NOTE — ANESTHESIA POSTPROCEDURE EVALUATION
Patient: Shirley Hendricks    Procedure(s):  Exploratory laparoscopy,  laparoscopic lysis of adhesions, laparotomy    Diagnosis:SBO (small bowel obstruction) (H) [K56.217]  Diagnosis Additional Information: No value filed.    Anesthesia Type:  General    Note:  Disposition: Inpatient   Postop Pain Control: Uneventful            Sign Out: Well controlled pain   PONV: No   Neuro/Psych: Uneventful            Sign Out: Acceptable/Baseline neuro status   Airway/Respiratory: Uneventful            Sign Out: Acceptable/Baseline resp. status   CV/Hemodynamics: Uneventful            Sign Out: Acceptable CV status; No obvious hypovolemia; No obvious fluid overload   Other NRE: NONE   DID A NON-ROUTINE EVENT OCCUR? No           Last vitals:  Vitals Value Taken Time   /84 08/11/21 1520   Temp 37.4  C (99.3  F) 08/11/21 1510   Pulse 79 08/11/21 1525   Resp 18 08/11/21 1525   SpO2 94 % 08/11/21 1525   Vitals shown include unvalidated device data.    Electronically Signed By: William Butlre MD  August 11, 2021  3:25 PM

## 2021-08-12 ENCOUNTER — APPOINTMENT (OUTPATIENT)
Dept: GENERAL RADIOLOGY | Facility: CLINIC | Age: 61
DRG: 335 | End: 2021-08-12
Attending: NURSE PRACTITIONER
Payer: COMMERCIAL

## 2021-08-12 LAB
ANION GAP SERPL CALCULATED.3IONS-SCNC: 6 MMOL/L (ref 3–14)
BASOPHILS # BLD AUTO: 0 10E3/UL (ref 0–0.2)
BASOPHILS NFR BLD AUTO: 0 %
BUN SERPL-MCNC: 17 MG/DL (ref 7–30)
CALCIUM SERPL-MCNC: 8.4 MG/DL (ref 8.5–10.1)
CHLORIDE BLD-SCNC: 104 MMOL/L (ref 94–109)
CO2 SERPL-SCNC: 25 MMOL/L (ref 20–32)
CREAT SERPL-MCNC: 0.83 MG/DL (ref 0.52–1.04)
CREAT SERPL-MCNC: 0.84 MG/DL (ref 0.52–1.04)
EOSINOPHIL # BLD AUTO: 0 10E3/UL (ref 0–0.7)
EOSINOPHIL NFR BLD AUTO: 0 %
ERYTHROCYTE [DISTWIDTH] IN BLOOD BY AUTOMATED COUNT: 12.9 % (ref 10–15)
GFR SERPL CREATININE-BSD FRML MDRD: 75 ML/MIN/1.73M2
GFR SERPL CREATININE-BSD FRML MDRD: 76 ML/MIN/1.73M2
GLUCOSE BLD-MCNC: 105 MG/DL (ref 70–99)
HCT VFR BLD AUTO: 32.6 % (ref 35–47)
HGB BLD-MCNC: 10.4 G/DL (ref 11.7–15.7)
IMM GRANULOCYTES # BLD: 0.1 10E3/UL
IMM GRANULOCYTES NFR BLD: 1 %
LYMPHOCYTES # BLD AUTO: 0.7 10E3/UL (ref 0.8–5.3)
LYMPHOCYTES NFR BLD AUTO: 6 %
MAGNESIUM SERPL-MCNC: 2.4 MG/DL (ref 1.6–2.3)
MCH RBC QN AUTO: 28.9 PG (ref 26.5–33)
MCHC RBC AUTO-ENTMCNC: 31.9 G/DL (ref 31.5–36.5)
MCV RBC AUTO: 91 FL (ref 78–100)
MONOCYTES # BLD AUTO: 0.8 10E3/UL (ref 0–1.3)
MONOCYTES NFR BLD AUTO: 7 %
NEUTROPHILS # BLD AUTO: 10.1 10E3/UL (ref 1.6–8.3)
NEUTROPHILS NFR BLD AUTO: 86 %
NRBC # BLD AUTO: 0 10E3/UL
NRBC BLD AUTO-RTO: 0 /100
PLATELET # BLD AUTO: 144 10E3/UL (ref 150–450)
POTASSIUM BLD-SCNC: 4.4 MMOL/L (ref 3.4–5.3)
RBC # BLD AUTO: 3.6 10E6/UL (ref 3.8–5.2)
SODIUM SERPL-SCNC: 135 MMOL/L (ref 133–144)
WBC # BLD AUTO: 11.8 10E3/UL (ref 4–11)

## 2021-08-12 PROCEDURE — 82565 ASSAY OF CREATININE: CPT | Performed by: PHYSICIAN ASSISTANT

## 2021-08-12 PROCEDURE — 250N000011 HC RX IP 250 OP 636: Performed by: PHYSICIAN ASSISTANT

## 2021-08-12 PROCEDURE — 120N000001 HC R&B MED SURG/OB

## 2021-08-12 PROCEDURE — 99231 SBSQ HOSP IP/OBS SF/LOW 25: CPT | Mod: 24 | Performed by: SURGERY

## 2021-08-12 PROCEDURE — 83735 ASSAY OF MAGNESIUM: CPT | Performed by: PHYSICIAN ASSISTANT

## 2021-08-12 PROCEDURE — 250N000009 HC RX 250: Performed by: NURSE PRACTITIONER

## 2021-08-12 PROCEDURE — 258N000003 HC RX IP 258 OP 636: Performed by: PHYSICIAN ASSISTANT

## 2021-08-12 PROCEDURE — 99233 SBSQ HOSP IP/OBS HIGH 50: CPT | Performed by: PHYSICIAN ASSISTANT

## 2021-08-12 PROCEDURE — 250N000009 HC RX 250: Performed by: PHYSICIAN ASSISTANT

## 2021-08-12 PROCEDURE — 36415 COLL VENOUS BLD VENIPUNCTURE: CPT | Performed by: PHYSICIAN ASSISTANT

## 2021-08-12 PROCEDURE — 71045 X-RAY EXAM CHEST 1 VIEW: CPT

## 2021-08-12 PROCEDURE — 94640 AIRWAY INHALATION TREATMENT: CPT | Mod: 76

## 2021-08-12 PROCEDURE — 250N000013 HC RX MED GY IP 250 OP 250 PS 637: Performed by: PHYSICIAN ASSISTANT

## 2021-08-12 PROCEDURE — 94664 DEMO&/EVAL PT USE INHALER: CPT

## 2021-08-12 PROCEDURE — 94640 AIRWAY INHALATION TREATMENT: CPT

## 2021-08-12 PROCEDURE — 80048 BASIC METABOLIC PNL TOTAL CA: CPT | Performed by: PHYSICIAN ASSISTANT

## 2021-08-12 PROCEDURE — 99232 SBSQ HOSP IP/OBS MODERATE 35: CPT | Performed by: INTERNAL MEDICINE

## 2021-08-12 PROCEDURE — 85025 COMPLETE CBC W/AUTO DIFF WBC: CPT | Performed by: PHYSICIAN ASSISTANT

## 2021-08-12 PROCEDURE — 999N000157 HC STATISTIC RCP TIME EA 10 MIN

## 2021-08-12 RX ORDER — METOPROLOL TARTRATE 1 MG/ML
5 INJECTION, SOLUTION INTRAVENOUS EVERY 6 HOURS
Status: DISCONTINUED | OUTPATIENT
Start: 2021-08-12 | End: 2021-08-12

## 2021-08-12 RX ORDER — ACETAMINOPHEN 650 MG/1
650 SUPPOSITORY RECTAL 4 TIMES DAILY
Status: DISCONTINUED | OUTPATIENT
Start: 2021-08-12 | End: 2021-08-16 | Stop reason: HOSPADM

## 2021-08-12 RX ORDER — ACETAMINOPHEN 325 MG/1
650 TABLET ORAL 4 TIMES DAILY
Status: DISCONTINUED | OUTPATIENT
Start: 2021-08-12 | End: 2021-08-16 | Stop reason: HOSPADM

## 2021-08-12 RX ORDER — CYCLOBENZAPRINE HCL 10 MG
10 TABLET ORAL 3 TIMES DAILY
Status: DISCONTINUED | OUTPATIENT
Start: 2021-08-12 | End: 2021-08-16 | Stop reason: HOSPADM

## 2021-08-12 RX ORDER — IPRATROPIUM BROMIDE AND ALBUTEROL SULFATE 2.5; .5 MG/3ML; MG/3ML
3 SOLUTION RESPIRATORY (INHALATION)
Status: DISCONTINUED | OUTPATIENT
Start: 2021-08-12 | End: 2021-08-14

## 2021-08-12 RX ADMIN — PIPERACILLIN AND TAZOBACTAM 3.38 G: 3; .375 INJECTION, POWDER, FOR SOLUTION INTRAVENOUS at 00:39

## 2021-08-12 RX ADMIN — OXYCODONE HYDROCHLORIDE 10 MG: 5 TABLET ORAL at 21:07

## 2021-08-12 RX ADMIN — METOPROLOL TARTRATE 5 MG: 5 INJECTION INTRAVENOUS at 09:01

## 2021-08-12 RX ADMIN — ACETAMINOPHEN 650 MG: 325 TABLET, FILM COATED ORAL at 21:02

## 2021-08-12 RX ADMIN — FAMOTIDINE 20 MG: 10 INJECTION, SOLUTION INTRAVENOUS at 16:20

## 2021-08-12 RX ADMIN — IPRATROPIUM BROMIDE AND ALBUTEROL SULFATE 3 ML: .5; 3 SOLUTION RESPIRATORY (INHALATION) at 12:45

## 2021-08-12 RX ADMIN — CYCLOBENZAPRINE 10 MG: 10 TABLET, FILM COATED ORAL at 16:20

## 2021-08-12 RX ADMIN — ACETAMINOPHEN 650 MG: 325 TABLET, FILM COATED ORAL at 18:45

## 2021-08-12 RX ADMIN — PIPERACILLIN AND TAZOBACTAM 3.38 G: 3; .375 INJECTION, POWDER, FOR SOLUTION INTRAVENOUS at 05:48

## 2021-08-12 RX ADMIN — NICOTINE 1 PATCH: 14 PATCH, EXTENDED RELEASE TRANSDERMAL at 09:03

## 2021-08-12 RX ADMIN — PIPERACILLIN AND TAZOBACTAM 3.38 G: 3; .375 INJECTION, POWDER, FOR SOLUTION INTRAVENOUS at 18:44

## 2021-08-12 RX ADMIN — IPRATROPIUM BROMIDE AND ALBUTEROL SULFATE 3 ML: .5; 3 SOLUTION RESPIRATORY (INHALATION) at 15:59

## 2021-08-12 RX ADMIN — LISINOPRIL 20 MG: 10 TABLET ORAL at 11:21

## 2021-08-12 RX ADMIN — ENOXAPARIN SODIUM 40 MG: 40 INJECTION SUBCUTANEOUS at 16:20

## 2021-08-12 RX ADMIN — OXYCODONE HYDROCHLORIDE 5 MG: 5 TABLET ORAL at 02:35

## 2021-08-12 RX ADMIN — PIPERACILLIN AND TAZOBACTAM 3.38 G: 3; .375 INJECTION, POWDER, FOR SOLUTION INTRAVENOUS at 12:58

## 2021-08-12 RX ADMIN — HYDROMORPHONE HYDROCHLORIDE 0.5 MG: 1 INJECTION, SOLUTION INTRAMUSCULAR; INTRAVENOUS; SUBCUTANEOUS at 00:39

## 2021-08-12 RX ADMIN — CYCLOBENZAPRINE 10 MG: 10 TABLET, FILM COATED ORAL at 11:21

## 2021-08-12 RX ADMIN — ACETAMINOPHEN 650 MG: 325 TABLET, FILM COATED ORAL at 13:02

## 2021-08-12 RX ADMIN — FAMOTIDINE 20 MG: 10 INJECTION, SOLUTION INTRAVENOUS at 05:48

## 2021-08-12 RX ADMIN — HYDROMORPHONE HYDROCHLORIDE 0.5 MG: 1 INJECTION, SOLUTION INTRAMUSCULAR; INTRAVENOUS; SUBCUTANEOUS at 09:01

## 2021-08-12 RX ADMIN — OXYCODONE HYDROCHLORIDE 10 MG: 5 TABLET ORAL at 13:02

## 2021-08-12 RX ADMIN — CARVEDILOL 6.25 MG: 6.25 TABLET, FILM COATED ORAL at 21:03

## 2021-08-12 RX ADMIN — LISINOPRIL 20 MG: 10 TABLET ORAL at 21:03

## 2021-08-12 RX ADMIN — IPRATROPIUM BROMIDE AND ALBUTEROL SULFATE 3 ML: .5; 3 SOLUTION RESPIRATORY (INHALATION) at 19:20

## 2021-08-12 RX ADMIN — SODIUM CHLORIDE: 9 INJECTION, SOLUTION INTRAVENOUS at 17:46

## 2021-08-12 RX ADMIN — VANCOMYCIN HYDROCHLORIDE 1250 MG: 5 INJECTION, POWDER, LYOPHILIZED, FOR SOLUTION INTRAVENOUS at 21:07

## 2021-08-12 RX ADMIN — ROSUVASTATIN CALCIUM 40 MG: 20 TABLET, FILM COATED ORAL at 21:03

## 2021-08-12 RX ADMIN — ACETAMINOPHEN 650 MG: 325 TABLET, FILM COATED ORAL at 11:21

## 2021-08-12 RX ADMIN — HYDROMORPHONE HYDROCHLORIDE 0.5 MG: 1 INJECTION, SOLUTION INTRAMUSCULAR; INTRAVENOUS; SUBCUTANEOUS at 06:27

## 2021-08-12 RX ADMIN — CARVEDILOL 6.25 MG: 6.25 TABLET, FILM COATED ORAL at 11:21

## 2021-08-12 RX ADMIN — CYCLOBENZAPRINE 10 MG: 10 TABLET, FILM COATED ORAL at 21:03

## 2021-08-12 RX ADMIN — ASPIRIN 81 MG CHEWABLE TABLET 81 MG: 81 TABLET CHEWABLE at 11:21

## 2021-08-12 ASSESSMENT — ACTIVITIES OF DAILY LIVING (ADL)
ADLS_ACUITY_SCORE: 14

## 2021-08-12 ASSESSMENT — MIFFLIN-ST. JEOR: SCORE: 1014.25

## 2021-08-12 NOTE — CODE/RAPID RESPONSE
Brief House Officer Note:  RRT called for worsening hypoxia with activity. Upon return to chair she was noted to have SaO2 in the 80% range, now around 90% upon my arrival. She started IS today with volume of 500- 750- ml. Congested cough noted, no sputum observed. Per EHR review she has known COPD of unknown severity and takes daily inhaler. She is not significantly distressed and is sitting in the chair.     Plan:  - encourage ambulation, IS aggressively  - add DuoNeb  - will check chest Xray   - low threshold to check for PE if above interventions do not improve oxygenation; will defer to Hospitalist     Discussed and defer further management to Vance Coulter PA-C, Hospitalist.     NOELLE Sandoval, CNP  Hospitalist Service, House Officer  [facility]     Text Page  Pager: [phone]

## 2021-08-12 NOTE — PROGRESS NOTES
[facility]    Hospitalist Progress Note    Assessment & Plan   Shirley Hendricks is a 60 year old female who was admitted on 8/10/2021.     Past medical history significant for hypertension, hyperlipidemia, COPD, CAD, SVT asymptomatic, PAD status post recent surgical revascularization of the right lower extremity in July 2021 for critical ischemia in extremity with history of revascularization, cholecystectomy, salpingectomy  admitted on 8/10/2021 with abdominal pain and small bowel obstruction.    Closed loop small bowel obstruction secondary to omental adhesions  Abd CT scan showed multiple dilated fluid-filled loops of mid small bowel in the lower abdomen have an appearance suspicious for a closed loop small bowel obstruction, an enlarged right common iliac chain lymph node and multiple enlarged bilateral inguinal lymph nodes have increased in size, and are indeterminate.  *NG tube kinked and subsequently removed 8/12.    *UA ordered but specimen not collected yet.    -General surgery consultation appreciated:   --S/p exploratory laparoscopy with laparoscopic lysis of adhesions converted to laparotomy with extensive lysis of adhesions (8/11).   --Alvarez removed this morning.   --Hold Plavix as this time.    -NPO except for medications, ice chips.  -IV fluids for insensible losses.  -Antiemetics and analgesia.  -PT consult requested.      Hypoxia  Patient developed acute hypoxia when she got up to use the bathroom.  RRT was called.  *Chest Xray revealed new small right pleural effusion and right basilar atelectasis/infiltrate.  No pneumothorax.  Left lung clear.    -Scheduled DuoNeb.  -Encourage IS and ambulation.   -Encourage flutter valve use.     -Will monitor and if no improvement would check for PE.      Mild leukocytosis  WBC of 11.3.    -Trend temp and WBC.    -Currently on Vanco/Zosyn.      Post-op anemia  HGB seems to fluctuate when reviewing EMR.  Pre-op HGB was 12 post-op at  10.4. Likely multifactorial with surgical blood loss as well as dilutional effect from IV fluids.    - Monitor.      Right medial thigh wound  Failed to heal surgical wound from right lower extremity revascularization.    *Notably seen at [facility] by Dr. Montero 8/3/21 when she presented due to partial dehiscence and fluctuant area on the left shoulder surgical site with fluctuant area.     --At that visit; thigh wound with noted dehiscence and some serous drainage but no purulence and was sutured.  Left shoulder fluctuant area with small amount of purulence (abscess) that was compressed/expressed and no need for systemic antibiotics.    -WOCN consult appreciated:   --Noted purulent liquid drainage upon assessment morning of 8/11.     --Recommending Vascular surgery consult.    -Appreciate Vascular surgery consult.    -IV Vanco/Zosyn; likely stop tomorrow.      PAD   Status post above-the-knee popliteal to below the knee popliteal artery bypass using left upper extremity translocated cephalic vein for critical limb ischemia of extremity with history of revascularization on 7/6/2021 with [place] vascular surgery. She has been on dual antiplatelet therapy since then. I refer the interested reader to [facility] consultation note from 7/7/2021 for additional details of her PAD.  -Initially planned to resume PTA ASA and Plavix.    --Vascular med consultation requested by admitting Hospitalist.    -Discussed with general surgery (8/12) and will resume ASA and start Lovenox.  Plan to check HGB tomorrow and if stable can restart Plavix.      Severe malnutrition  Unintentional 26 pound weight loss in the last 8 months and following recent vascular surgery and poor wound healing of the right lower extremity  -Appreciate dietitian consult.      Hypertension  BP in the urgency ranges 220/98 at home, PTA regimen includes lisinopril 20 mg BID and Coreg 6.25 mg BID.  Patient was also on amlodipine 5mg BID from  4/8/21-7/7/21, seems to have been stopped sometime in between.  -We will continue PTA lisinopril and coreg with hold parameters   --If BPs consistently running high can resume amlodipine  -PRN labetalol and hydralazine for SBP sustained greater than 180 mmHg    Hyperlipidemia  -Resume PTA Crestor.    CAD diffuse, moderate, nonobstructive   by 2009 coronary angiogram.  -Antihypertensive and lipid lower agents as above.    -Dual antiplatelet therapy as noted elsewhere.    Hyponatremia (Resolved)  Mild, asymptomatic, will check prior levels to determine baseline.  -NS IV fluids for insensible losses while NPO.    Likely COPD   Severity unknown, last seen at [facility] for lung science in 2014 for lung cancer screening but not felt to actually have COPD at that time. Although she has continued to smoke since then. PFTs prior to that were inconclusive and she has had known mediastinal and axillary lymphadenopathy.  -Continue PTA inhalers.  -Please see plan above regarding hypoxia.      Tobacco use   Attempts to quit have thus far been unsuccessful but she is ready to try again.   -Nicotine replacement therapy.    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  No interventions.      Pulmonary nodule, incidental  Indeterminate 0.4 cm right lower lobe pulmonary nodule is new  since the previous exam.   -Patient will need to be updated regarding this finding and follow-up.    -Outpatient follow-up in 12 months to reevaluate pulmonary nodule with [facility] pulmonology    Covid-19 screening  PCR is negative on 8/10/2021, vaccination status not discussed    History of SVT asymptomatic  Seen on zio patch as work up for lightheadedness  -Low threshold to start telemetry if any symptoms    Clinically Significant Risk Factors Present on Admission           # Severe Malnutrition, POA: based on Weight loss;Reduced intake;Subcutaneous fat loss;Muscle loss (08/11/21 1000)       Diet: NPO for  Medical/Clinical Reasons Except for: Ice Chips, Meds     DVT Prophylaxis: Pneumatic Compression Devices   Alvarez Catheter: Not present  Code Status: Full Code     Disposition Plan    Expected discharge: 08/15/2021 recommended to TBD once tolerated advancement in diet; hypoxia has resolved.   Entered: Kavin Coulter PA-C 08/12/2021, 2:08 PM        The patient's care was discussed with the Bedside Nurse, Patient and General surgery and House NP.      The patient has been discussed with Dr. Perez, who agrees with the assessment and plan at this time.    Kavin Coulter PA-C  [facility]  Securely message with the Vocera Web Console (learn more here)  Text page via Yap Paging/Directory      Interval History   Before seeing the patient I was able to ask general surgery about Plavix, ASA and DVT prophylaxis.      Patient was resting in bed upon arrival.  Her pulse oximeter was beeping and I silenced it and she stated it would just start beeping again in 2 minutes.  Looking at the pulse oximeter her O2 saturations were normal and her heart rate was in the 60's.      Patient denied fever, chills, chest pain, shortness of breath.  She has abdominal pain and has begun to pass some flatus.  She has had no issue urinating.   She had no questions at this time.      Shortly after my visit I was contacted by LITO Sandoval of the house team and informed patient had worsening hypoxia with ambulation.  She got up to use the bathroom.  After encouragement and observation of incentive spirometer use patient developed significant cough.  Chest Xray ordered as well as scheduled DuoNebs.      I attempted to return to the patient's room and check in with her.  Patient was asleep upon my return and I did not wake her.      -Data reviewed today: I reviewed all new labs and imaging results over the last 24 hours. I personally reviewed no images or EKG's today.    Physical Exam   Temp: 98  F  (36.7  C) Temp src: Axillary BP: (!) 147/69 Pulse: 61   Resp: 16 SpO2: 96 % O2 Device: Nasal cannula Oxygen Delivery: 2 LPM    Vitals:    08/10/21 0809 08/10/21 1414 08/11/21 0605   Weight: 49.4 kg (109 lb) 45.4 kg (100 lb 1.4 oz) 47 kg (103 lb 9.9 oz)     Vital Signs with Ranges  Temp:  [96.7  F (35.9  C)-99.5  F (37.5  C)] 98  F (36.7  C)  Pulse:  [63-80] 66  Resp:  [12-20] 20  BP: (114-198)/(61-95) 167/71  SpO2:  [88 %-99 %] 94 %  I/O last 3 completed shifts:  In: 2160 [I.V.:1910]  Out: 605 [Urine:250; Emesis/NG output:350; Blood:5]      Constitutional: Awake, alert, cooperative, no apparent distress.    ENT: Normocephalic, without obvious abnormality, atraumatic, oral pharynx with moist mucus membranes, tonsils without erythema or exudates.  Neck: Supple, symmetrical, trachea midline, no adenopathy.  Pulmonary: No increased work of breathing, good air exchange, clear to auscultation bilaterally, no crackles or wheezing.  Cardiovascular: Regular rate and rhythm, normal S1 and S2, no S3 or S4, and no murmur noted.  GI: Hypoactive bowel sounds.  Limited exam as abdominal binder in place.  Appears soft and non-tender.    Skin/Integumen: Clear.  Neuro: CN II-XII grossly intact.  Psych:  Alert and oriented x 3. Normal affect.  Extremities: No lower extremity edema noted, and calves are non-TTP bilaterally.       Medications     sodium chloride 50 mL/hr at 08/11/21 1727       acetaminophen  650 mg Oral 4x Daily    Or     acetaminophen  650 mg Rectal 4x Daily     aspirin  81 mg Oral Daily     carvedilol  6.25 mg Oral BID     [Held by provider] clopidogrel  75 mg Oral Daily     cyclobenzaprine  10 mg Oral TID     enoxaparin ANTICOAGULANT  40 mg Subcutaneous Q24H     famotidine  20 mg Intravenous Q12H     fluticasone-vilanterol  1 puff Inhalation Daily     ipratropium - albuterol 0.5 mg/2.5 mg/3 mL  3 mL Nebulization 4x daily     lisinopril  20 mg Oral BID     nicotine  1 patch Transdermal Daily     nicotine   Transdermal  Q8H     piperacillin-tazobactam  3.375 g Intravenous Q6H     rosuvastatin  40 mg Oral At Bedtime     sodium chloride (PF)  3 mL Intracatheter Q8H     vancomycin  1,250 mg Intravenous Q24H       Data   Recent Labs   Lab 08/12/21  0643 08/11/21  0821 08/10/21  1452 08/10/21  0833   WBC 11.8* 11.3*  --  8.3   HGB 10.4* 12.0  --  12.1   MCV 91 89  --  89   * 174  --  198    135  --  131*   POTASSIUM 4.4 4.2  --  4.1   CHLORIDE 104 104  --  100   CO2 25 21  --  26   BUN 17 16  --  12   CR 0.84 0.76 0.62 0.65   ANIONGAP 6 10  --  5   SONIA 8.4* 8.9  --  9.5   * 95  --  94   ALBUMIN  --   --   --  3.6   PROTTOTAL  --   --   --  7.7   BILITOTAL  --   --   --  0.5   ALKPHOS  --   --   --  70   ALT  --   --   --  24   AST  --   --   --  19   LIPASE  --   --   --  132       Recent Results (from the past 24 hour(s))   XR Abdomen Port 1 View    Narrative    ABDOMEN ONE VIEW PORTABLE  8/11/2021 3:34 PM     HISTORY: NG tube location verification.    COMPARISON: 8/11/21 at 7:08 AM.       Impression    IMPRESSION: NG tube tip only minimally advanced into the left upper  quadrant.    CHANI CANALES MD         SYSTEM ID:  [id number]   XR Chest Port 1 View    Narrative    XR CHEST PORT 1 VIEW 8/12/2021 1:01 PM    HISTORY: increasing hypoxia    COMPARISON: 1/5/2021 and CT of the abdomen 8/10/2021      Impression    IMPRESSION: New small right pleural effusion and right basilar  atelectasis/infiltrate. No pneumothorax. The left lung is clear.  Normal heart size. Tortuous aorta with atherosclerotic calcifications.  Cholecystectomy clips.    BRANDON ALBERTS MD         SYSTEM ID:  [id number]

## 2021-08-12 NOTE — PLAN OF CARE
[id number]. A&Ox4. VSS on 1-2 L NC. EMMANUEL. Productive cough, using IS. NPO except ice chips, sips of water and meds. Voiding in BR. Up SBA. Lap sites x3, midline incision with liquid bandage. Given oxycodone for pain. Denies nause. BS+, Flatus +.

## 2021-08-12 NOTE — CONSULTS
Vascular Surgery Consultation    Shirley Hendricks MRN# [id number]   Age: 60 year old YOB: 1960     Date of Admission:  8/10/2021    Reason for consult: Right thigh wound dehiscence       Requesting physician: Dr. Kavin Coulter                Assessment and Plan:   Assessment:   Shirley Hendricks is a 60 year old female with PMHx of HTN, HLD, COPD, CAD, SVT, PAD s/p right lower extremity above to below knee popliteal bypass using left cephalic vein on 07/07/21, cholecystectomy, salpingectomy, admitted 8/10/21 with small bowel obstruction. Underwent exploratory laparotomy with general surgery service on 8/11. Vascular surgery team consulted for assessment of right medial thigh wound. On exam, wound has 1 cm area of dehiscence though no active bleeding or purulence, no surrounding erythema, no fluctuance. Right lower extremity with good perfusion, able to move toes without difficulty and no pain or numbness. In absence of infection, recommend continued wound cares as detailed below      Plan:   - No surgical debridement of wound at this time  - Pack wound with 0.5 inch packing strip daily and cover with gauze and tape  - Monitor exam  - Vascular surgery team will continue to follow             Chief Complaint:     Chief Complaint   Patient presents with     Abdominal Pain        History is obtained from the patient         History of Present Illness:   This patient is a 60 year old  female with a significant past medical history of HTN, HLD, COPD, CAD, SVT, PAD s/p right lower extremity above to below knee popliteal bypass using left cephalic vein on 07/07/21, cholecystectomy, salpingectomy who presents with   .small bowel obstruction, now s/p exploratory laparotomy with general surgery team on 8/11. She has had an open area on her right thigh incision since her surgery in July. This was assessed in vascular surgery clinic on 8/3/21. No infection noted of right thigh at that time,  plan was to heal by secondary intention. She states that pain has been present over that area but has been manageable, lately she has not noticed it due to her GI issues. States pain well controlled this morning, denies numbness or weakness in right lower extremity. No fevers, though felt cold overnight. No nausea or emesis since exploratory laparotomy last night.           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          Past Surgical History:     Past Surgical History:   Procedure Laterality Date     ANGIOGRAM       ANGIOGRAM Right 6/23/2021    Procedure: RIGHT LOWER EXTREMITY ANGIOGRAM WITH LEFT BRACHIAL ARTERY CUTDOWN;  Surgeon: José Luis Hernandez MD;  Location:  OR     BYPASS GRAFT FEMOROPOPLITEAL Right 09/23/2020    Procedure: RIGHT FEMORAL GRAFT TO ABOVE-KNEE POPLITEAL BYPASS USING CADAVERIC SUPERFICIAL FEMORAL ARTERY;  Surgeon: Chadwick Lozano MD;  Location: [facility] OR     BYPASS GRAFT INSITU FEMOROPOPLITEAL Right 7/7/2021    Procedure: CREATION RIGHT FEMORAL TO POPLITEAL ARTERIAL BYPASS USING INSITU VEIN GRAFT;  Surgeon: José Luis Hernandez MD;  Location:  OR     C FABRIC WRAPPING OF ABDOMINAL ANEURYSM       CARDIAC  CATHERIZATION  09/03/2009    multivessel CAD without target lesions, med mgmt indicated, preserved ef     CARPAL TUNNEL RELEASE RT/LT Right 05/20/2021     ENDARTERECTOMY CAROTID Right 05/11/2016    Procedure: ENDARTERECTOMY CAROTID;  Surgeon: Chadwick Lozano MD;  Location:  OR     ENDARTERECTOMY CAROTID Left 06/08/2020    Procedure: LEFT CAROTID ENDARTERECTOMY with distal facal vein patch  and EEG;  Surgeon: Chadwick Lozano MD;  Location:  OR     GYN SURGERY  left tube    left salpingectomy     IR LOWER EXTREMITY ANGIOGRAM RIGHT  05/25/2021     IR OR ANGIOGRAM  6/23/2021     LAPAROSCOPIC CHOLECYSTECTOMY N/A 09/27/2017    Procedure: LAPAROSCOPIC CHOLECYSTECTOMY;  LAPAROSCOPIC CHOLECYSTECTOMY;  Surgeon: Jacoby Tapia MD;  Location: [facility]     ORTHOPEDIC SURGERY      left knee surgery     VASCULAR SURGERY  aoto bi fem  left fem-AK pop     [facility] NONSPECIFIC PROCEDURE  12/2000    angioplasty with stent right fem. a.     [facility] NONSPECIFIC PROCEDURE  1987    sinus surgery     [facility] NONSPECIFIC PROCEDURE  09/02/2009    Emergent left groin exploration with oversewing of bleeding angiographic site. 2. Endarterectomy of common femoral-proximal superficial femoral artery with greater saphenous vein patch angioplasty.   a. [place] of accessory right greater saphenous vein.      [facility] NONSPECIFIC PROCEDURE  08/27/2009    occluded left common iliac and external iliac arteries were successfully revascularized with stenting to 8 and 7 mm            Medications:   No current facility-administered medications on file prior to encounter.  acetaminophen (TYLENOL) 500 MG tablet, Take 500-1,000 mg by mouth every 6 hours as needed for mild pain  Calcium Carbonate-Vitamin D3 (CALCIUM 600-D) 600-400 MG-UNIT TABS, Take 1 tablet by mouth every evening  carvedilol (COREG) 6.25 MG tablet, Take 6.25 mg by mouth 2 times daily (with meals)  clopidogrel (PLAVIX) 75 MG tablet, Take 1 tablet (75 mg) by mouth daily  denosumab  "(PROLIA) 60 MG/ML SOLN injection, Inject 1 mL (60 mg) Subcutaneous every 6 months INDICATION: TO TREAT OSTEOPOROSIS  fluticasone-vilanterol (BREO ELLIPTA) 200-25 MCG/INH inhaler, Inhale 1 puff into the lungs daily  lisinopril (ZESTRIL) 20 MG tablet, Take 20 mg by mouth 2 times daily   nicotine (NICODERM CQ) 21 MG/24HR 24 hr patch, Place 1 patch onto the skin every 24 hours  nitroGLYcerin (NITROSTAT) 0.4 MG sublingual tablet, Place 1 tablet (0.4 mg) under the tongue See Admin Instructions for chest pain  omeprazole (PRILOSEC) 20 MG DR capsule, TAKE ONE CAPSULE BY MOUTH DAILY  rosuvastatin (CRESTOR) 40 MG tablet, Take 1 tablet (40 mg) by mouth At Bedtime  aspirin (ASA) 81 MG chewable tablet, Take 1 tablet (81 mg) by mouth daily  nicotine (NICODERM CQ) 14 MG/24HR 24 hr patch, Place 1 patch onto the skin every 24 hours  nicotine (NICODERM CQ) 7 MG/24HR 24 hr patch, Place 1 patch onto the skin every 24 hours Start after completing 14 mg patches.          Allergies:      Allergies   Allergen Reactions     Contrast Dye Anaphylaxis     RASH, FACIAL AND NECK SWELLING, SOB, WHEEZING     Pantoprazole      Protonix caused diffuse edema     Chantix [Varenicline]      Terrible dreams     Penicillins Itching               Family History:   The patient has no family history of any bleeding, clotting or anesthesia problems.          Review of Systems:   Ten point Review of Systems is negative other than noted in the HPI          Physical Exam:   Gen:  This is a well developed, well nourished woman in no apparent distress.  Blood pressure (!) 167/71, pulse 66, temperature 98  F (36.7  C), temperature source Oral, resp. rate 20, height 1.575 m (5' 2\"), weight 47 kg (103 lb 9.9 oz), SpO2 94 %, not currently breastfeeding.  HEENT - Normocephalic, atraumatic, mucous membranes moist  Neck - supple without masses  Lungs - nonlabored breathing on nasal canula  Heart - Regular rate & rhythm without murmur  Abdomen: Covered in abdominal " binder, appropriately tender to palpation  Extremities - warm without edema, intact bilateral dorsiflexion/plantarflexion. Incision on medial thigh with 1 cm area of dehiscence, no surrounding erythema. Scant drainage, no bleeding or purulence. Wound does not track deeply.   Neurologic - nonfocal          Data:   WBC -   WBC   Date Value Ref Range Status   07/09/2021 5.3 4.0 - 11.0 10e9/L Final     WBC Count   Date Value Ref Range Status   08/11/2021 11.3 (H) 4.0 - 11.0 10e3/uL Final   ], HgB -   Hemoglobin   Date Value Ref Range Status   08/11/2021 12.0 11.7 - 15.7 g/dL Final   07/09/2021 8.8 (L) 11.7 - 15.7 g/dL Final   ]   Liver Function Studies -   Recent Labs   Lab Test 08/10/21  0833   PROTTOTAL 7.7   ALBUMIN 3.6   BILITOTAL 0.5   ALKPHOS 70   AST 19   ALT 24     Roman Amezquita MD   8/12/2021 at 6:12 AM  General Surgery - PGY4  [phone]    STAFF: Feeling better this morning.  NG tube is out.  Appreciate Dr. Ferreira's excellent care for closed-loop SBO.  Right distal thigh wound is healing well by secondary intent.  +3 right DP pulse and left graft pulse and very stable from vascular standpoint.       Chadwick Lozano MD

## 2021-08-12 NOTE — PROVIDER NOTIFICATION
MD Notification    Notified Person: Corina Ferreira MD    Notified Person Name: Jermain Mtz RN    Notification Date/Time: 8/12/21    Notification Interaction: Phone call    Purpose of Notification:  No output on NG tube.  Tube kinked  At back of throat. Patient gaging.    Orders Received: Remove NG tube. If patient declines replacement, place on strict NPO.     Comments:

## 2021-08-12 NOTE — PROGRESS NOTES
[facility]    General Surgery  Daily Progress Note       Assessment and Plan:   Shirley Hendricks is a 60 year old female admitted with closed loop small bowel obstruction secondary to omental adhesions, now 1 day s/p exploratory laparoscopy with laparoscopic lysis of adhesions converted to laparotomy with extensive lysis of adhesions     PLAN:  - NG tube fell out overnight, pain with attempts at replacement. Chloraseptic throat spray ordered.  - Flatus+, still some abdominal distension. Okay for ice chips and small sips of water. Continue IV fluids.   - Leukocytosis this morning, recheck tomorrow. Vitals wnl.  - Okay for DVT prophylaxis from surgical standpoint. Recheck Hgb tomorrow morning before resuming Plavix.  - Transition to oral analgesia as able. Oxycodone, scheduled tylenol, and flexeril.  - Encourage ambulation QID, work with PT. Deep breathe and IS.        Interval History:   Shirley Hendricks is seen this morning on surgical rounds.She had a rough night with NG falling out and attempts at replacement. She has a sore throat this morning. Abdominal pain is improving, now localized to incision. She has passed flatus yesterday evening and this morning. No nausea with ice chips. Vitals wnl.         Physical Exam:   Temp: 98  F (36.7  C) Temp src: Oral BP: (!) 146/60 Pulse: 66   Resp: 16 SpO2: 94 % O2 Device: Nasal cannula Oxygen Delivery: 2 LPM    I/O last 3 completed shifts:  In: 1350 [I.V.:1100]  Out: 755 [Urine:450; Emesis/NG output:300; Blood:5]    GENERAL: VS reviewed, alert, oriented, no acute distress  LUNGS: Normal respiratory effort, no wheezing  ABDOMEN:  Soft, nontender, non-distended and good bowel sounds  INCISION: Surgical glue in place, clean, dry, and intact. No surrounding erythema.  EXTREMITIES: Moving all extremities  NEUROLOGICAL: Grossly non-focal, mood & affect appropriate    Data   Recent Labs   Lab 08/12/21  0643 08/11/21  0821 08/10/21  1452  08/10/21  0833   WBC 11.8* 11.3*  --  8.3   HGB 10.4* 12.0  --  12.1   MCV 91 89  --  89   * 174  --  198    135  --  131*   POTASSIUM 4.4 4.2  --  4.1   CHLORIDE 104 104  --  100   CO2 25 21  --  26   BUN 17 16  --  12   CR 0.84 0.76 0.62 0.65   ANIONGAP 6 10  --  5   SONIA 8.4* 8.9  --  9.5   * 95  --  94   ALBUMIN  --   --   --  3.6   PROTTOTAL  --   --   --  7.7   BILITOTAL  --   --   --  0.5   ALKPHOS  --   --   --  70   ALT  --   --   --  24   AST  --   --   --  19       Krissy Flower PA-C

## 2021-08-12 NOTE — PLAN OF CARE
Alert and oriented x4. Vital signs stable on RA. Assist of 1. Patient on strict NPO. Lungs sounds clear. BS hypoactive. Flatus +. BM-. Alvarez in place with adequate output. Lap sites x3 and midline incision with liquid bandage. Pain managed with PRN Dilaudid 0.5mg IV and Oxycodone 5mg PO. Denies nausea. Alvarez removed at 0615. Due to void.

## 2021-08-12 NOTE — PROGRESS NOTES
UPDATE:    Returned to patient's room around 3:55 PM.  Patient was asleep in bed but awakened easily.  Respiratory therapy also arrived to give patient a neb treatment.      Patient denied having any chest pain or feeling short of breath.  She said she did develop a cough earlier but that has since calmed down.  We reviewed chest Xray findings (rigth lower lobe atelectasis/infiltrate).  We discussed symptoms of blood clot to the lung and she denied having any of those symptoms.      We also reviewed CT results regarding pulmonary nodule and recommended follow with a repeat Chest CT.      Vance Coulter PA-C

## 2021-08-12 NOTE — PROGRESS NOTES
Arrived from PACU around 1700. C/o incisional abdomen pain 10/10 given PRN IV dilaudid with relief. IVF @ 50ml/hr. NPO excepts meds and ice chips. 3 lap sites, 1 midline incision CDI. Abdominal binder in place. Alvarez patent. BP elevated on arrival. Given PRN labetalol with improvement. Wound on R thigh, started on IV abx, WOC following. Vascular consulted.

## 2021-08-12 NOTE — DISCHARGE INSTRUCTIONS
[facility] - [facility]  Discharge Instructions: Post-Operative Bowel Surgery    ACTIVITY    Expect to feel tired after your surgery.  This will gradually resolve.      Take frequent, short walks and increase your activity gradually.      Avoid strenuous physical activity or heavy lifting greater than 15 lbs. for 4 weeks.  You may climb stairs.      You may drive without restrictions when you are not using any prescription pain medication and feel comfortable in a car.    You may return to work/school when you are comfortable without any prescription pain medication.    You may wear an abdominal binder for comfort for 2-3 weeks from your surgery.  You can wash the abdominal binder and dry it on low heat in the dryer.    WOUND CARE    You may shower, but do not soak your incision(s) in a tub or pool for 2 weeks.  Pat your incisions dry after your shower and leave them open to air.  Apply dressing (Band-Aids or gauze/tape) as needed for comfort or drainage.    You have surgical glue on your incisions. Let this peel up and fall off on its own.    Do not apply any lotions, creams, or ointments to your incisions.    A ridge under your incisions is normal and will gradually resolve.    DIET    Stay on a low fiber diet until your follow up appointment.  Avoid heavy, spicy, greasy meals and gas forming foods, such as cabbage, broccoli, and onions.      You may find your appetite to be diminished briefly after surgery.  You may take nutritional supplement shakes if you are able.     Drink plenty of fluids to stay hydrated.    PAIN    Expect some tenderness and discomfort at the incision site(s).  Use the prescribed pain medication at your discretion.  Expect gradual resolution of your pain over several days.    You are prescribed cyclobenzaprine (muscle relaxant) for 7 days, take this 3 times a day. You may take oxycodone as needed for pain.  You should take Tylenol 500 - 1000 mg every 6 hours as needed for  pain - do not exceed 4,000 mg of tylenol (acetaminophen) from all sources in 24 hours.    Do not drink alcohol or drive while you are taking pain medications.    You may apply ice to your incisions in 20 minute intervals as needed for the next 24-48 hours.  After that time, consider switching to heat if you prefer.    EXPECTATIONS    Pain medications can cause constipation.  Limit use when possible.  Take over the counter stool softener/stimulant, such as Colace or Senna, 1-2 times a day with plenty of water.  You may take a mild over the counter laxative, such as Miralax or a suppository, as needed.  You may discontinue these medications once you are having regular bowel movements and/or are no longer taking your narcotic pain medication.    If you had laparoscopic surgery, you may have shoulder or upper back discomfort due to the gas used in surgery.  This is temporary and should resolve in 48-72 hours after the surgery.  Short, frequent walks may help with this.    RETURN APPOINTMENT    Follow up with your surgeon, Dr. Ferreira, in 10-14 days.  Please call our office at [phone] to schedule your appointment.  We are located at [address] [place] [address].    CALL OUR OFFICE [phone] IF YOU HAVE:     Chills or fever above 101  F.    Increased redness, warmth, or drainage at your incisions.    Significant bleeding.    Pain not relieved by your pain medication or rest.    Increasing pain after the first 48 hours.    Any other concerns or questions.       **If you have concerns or questions about your procedure,    please contact Dr. Ferreira at  [phone]**

## 2021-08-12 NOTE — PHARMACY-VANCOMYCIN DOSING SERVICE
"Pharmacy Vancomycin Initial Note  Date of Service 2021  Patient's  1960  60 year old, female    Indication: Skin and Soft Tissue Infection    Current estimated CrCl = Estimated Creatinine Clearance: 56.9 mL/min (based on SCr of 0.76 mg/dL).    Creatinine for last 3 days  8/10/2021:  8:33 AM Creatinine 0.65 mg/dL;  2:52 PM Creatinine 0.62 mg/dL  2021:  8:21 AM Creatinine 0.76 mg/dL    Recent Vancomycin Level(s) for last 3 days  No results found for requested labs within last 72 hours.      Vancomycin IV Administrations (past 72 hours)      No vancomycin orders with administrations in past 72 hours.                Nephrotoxins and other renal medications (From now, onward)    Start     Dose/Rate Route Frequency Ordered Stop    21 1900  vancomycin 1250 mg in 0.9% NaCl 250 mL intermittent infusion 1,250 mg      1,250 mg  over 90 Minutes Intravenous EVERY 24 HOURS 21 1844      21 1830  piperacillin-tazobactam (ZOSYN) 3.375 g vial to attach to  mL bag     Note to Pharmacy: For [facility], [facility] and [facility]: For Zosyn-naive patients, use the \"Zosyn initial dose + extended infusion\" order panel.    3.375 g  over 30 Minutes Intravenous EVERY 6 HOURS 21 1759      08/10/21 1430  lisinopril (ZESTRIL) tablet 20 mg      20 mg Oral 2 TIMES DAILY 08/10/21 1152            Contrast Orders - past 72 hours (72h ago, onward)    None          AUC24,ss Ctrough,ss PAUC* Pconc* Tox.  479mg/L.hr 12.7mg/L 71% 14% 8%     Plan:  1. Start vancomycin  1250 mg IV q24h.   2. Vancomycin monitoring method: AUC  3. Vancomycin therapeutic monitoring goal: 400-600 mg*h/L  4. Pharmacy will check vancomycin levels as appropriate in 1-3 Days.    5. Serum creatinine levels will be ordered daily for the first week of therapy and at least twice weekly for subsequent weeks.      Antonia Santos"

## 2021-08-13 ENCOUNTER — APPOINTMENT (OUTPATIENT)
Dept: PHYSICAL THERAPY | Facility: CLINIC | Age: 61
DRG: 335 | End: 2021-08-13
Attending: PHYSICIAN ASSISTANT
Payer: COMMERCIAL

## 2021-08-13 ENCOUNTER — APPOINTMENT (OUTPATIENT)
Dept: NUCLEAR MEDICINE | Facility: CLINIC | Age: 61
DRG: 335 | End: 2021-08-13
Attending: INTERNAL MEDICINE
Payer: COMMERCIAL

## 2021-08-13 ENCOUNTER — APPOINTMENT (OUTPATIENT)
Dept: GENERAL RADIOLOGY | Facility: CLINIC | Age: 61
DRG: 335 | End: 2021-08-13
Attending: INTERNAL MEDICINE
Payer: COMMERCIAL

## 2021-08-13 LAB
ANION GAP SERPL CALCULATED.3IONS-SCNC: 11 MMOL/L (ref 3–14)
BASOPHILS # BLD AUTO: 0.1 10E3/UL (ref 0–0.2)
BASOPHILS NFR BLD AUTO: 1 %
BUN SERPL-MCNC: 20 MG/DL (ref 7–30)
CALCIUM SERPL-MCNC: 8.4 MG/DL (ref 8.5–10.1)
CHLORIDE BLD-SCNC: 106 MMOL/L (ref 94–109)
CO2 SERPL-SCNC: 19 MMOL/L (ref 20–32)
CREAT SERPL-MCNC: 0.72 MG/DL (ref 0.52–1.04)
CREAT SERPL-MCNC: 0.73 MG/DL (ref 0.52–1.04)
D DIMER PPP FEU-MCNC: 10.38 UG/ML FEU (ref 0–0.5)
EOSINOPHIL # BLD AUTO: 0.1 10E3/UL (ref 0–0.7)
EOSINOPHIL NFR BLD AUTO: 1 %
ERYTHROCYTE [DISTWIDTH] IN BLOOD BY AUTOMATED COUNT: 13.2 % (ref 10–15)
FASTING STATUS PATIENT QL REPORTED: YES
GFR SERPL CREATININE-BSD FRML MDRD: 89 ML/MIN/1.73M2
GFR SERPL CREATININE-BSD FRML MDRD: 90 ML/MIN/1.73M2
GLUCOSE BLD-MCNC: 45 MG/DL (ref 70–99)
GLUCOSE BLD-MCNC: 46 MG/DL (ref 70–99)
GLUCOSE BLDC GLUCOMTR-MCNC: 110 MG/DL (ref 70–99)
GLUCOSE BLDC GLUCOMTR-MCNC: 113 MG/DL (ref 70–99)
GLUCOSE BLDC GLUCOMTR-MCNC: 74 MG/DL (ref 70–99)
HCT VFR BLD AUTO: 27.2 % (ref 35–47)
HGB BLD-MCNC: 8.3 G/DL (ref 11.7–15.7)
HGB BLD-MCNC: 8.5 G/DL (ref 11.7–15.7)
IMM GRANULOCYTES # BLD: 0 10E3/UL
IMM GRANULOCYTES NFR BLD: 1 %
LYMPHOCYTES # BLD AUTO: 0.8 10E3/UL (ref 0.8–5.3)
LYMPHOCYTES NFR BLD AUTO: 13 %
MAGNESIUM SERPL-MCNC: 2.1 MG/DL (ref 1.6–2.3)
MCH RBC QN AUTO: 29.3 PG (ref 26.5–33)
MCHC RBC AUTO-ENTMCNC: 31.3 G/DL (ref 31.5–36.5)
MCV RBC AUTO: 94 FL (ref 78–100)
MONOCYTES # BLD AUTO: 0.4 10E3/UL (ref 0–1.3)
MONOCYTES NFR BLD AUTO: 7 %
NEUTROPHILS # BLD AUTO: 4.9 10E3/UL (ref 1.6–8.3)
NEUTROPHILS NFR BLD AUTO: 77 %
NRBC # BLD AUTO: 0 10E3/UL
NRBC BLD AUTO-RTO: 0 /100
PLATELET # BLD AUTO: 134 10E3/UL (ref 150–450)
POTASSIUM BLD-SCNC: 3.8 MMOL/L (ref 3.4–5.3)
POTASSIUM BLD-SCNC: 3.8 MMOL/L (ref 3.4–5.3)
RBC # BLD AUTO: 2.9 10E6/UL (ref 3.8–5.2)
SODIUM SERPL-SCNC: 136 MMOL/L (ref 133–144)
WBC # BLD AUTO: 6.3 10E3/UL (ref 4–11)

## 2021-08-13 PROCEDURE — A9540 TC99M MAA: HCPCS | Performed by: INTERNAL MEDICINE

## 2021-08-13 PROCEDURE — 85018 HEMOGLOBIN: CPT | Performed by: INTERNAL MEDICINE

## 2021-08-13 PROCEDURE — 94640 AIRWAY INHALATION TREATMENT: CPT | Mod: 76

## 2021-08-13 PROCEDURE — 99406 BEHAV CHNG SMOKING 3-10 MIN: CPT

## 2021-08-13 PROCEDURE — 78580 LUNG PERFUSION IMAGING: CPT

## 2021-08-13 PROCEDURE — 82565 ASSAY OF CREATININE: CPT | Performed by: INTERNAL MEDICINE

## 2021-08-13 PROCEDURE — 120N000001 HC R&B MED SURG/OB

## 2021-08-13 PROCEDURE — 97530 THERAPEUTIC ACTIVITIES: CPT | Mod: GP

## 2021-08-13 PROCEDURE — 258N000001 HC RX 258: Performed by: INTERNAL MEDICINE

## 2021-08-13 PROCEDURE — 94640 AIRWAY INHALATION TREATMENT: CPT

## 2021-08-13 PROCEDURE — 97116 GAIT TRAINING THERAPY: CPT | Mod: GP

## 2021-08-13 PROCEDURE — 83735 ASSAY OF MAGNESIUM: CPT | Performed by: PHYSICIAN ASSISTANT

## 2021-08-13 PROCEDURE — 99233 SBSQ HOSP IP/OBS HIGH 50: CPT | Performed by: INTERNAL MEDICINE

## 2021-08-13 PROCEDURE — 250N000009 HC RX 250: Performed by: NURSE PRACTITIONER

## 2021-08-13 PROCEDURE — 250N000013 HC RX MED GY IP 250 OP 250 PS 637: Performed by: PHYSICIAN ASSISTANT

## 2021-08-13 PROCEDURE — 258N000003 HC RX IP 258 OP 636: Performed by: PHYSICIAN ASSISTANT

## 2021-08-13 PROCEDURE — 80048 BASIC METABOLIC PNL TOTAL CA: CPT | Performed by: INTERNAL MEDICINE

## 2021-08-13 PROCEDURE — 999N000157 HC STATISTIC RCP TIME EA 10 MIN

## 2021-08-13 PROCEDURE — 36415 COLL VENOUS BLD VENIPUNCTURE: CPT | Performed by: PHYSICIAN ASSISTANT

## 2021-08-13 PROCEDURE — 97161 PT EVAL LOW COMPLEX 20 MIN: CPT | Mod: GP

## 2021-08-13 PROCEDURE — 36415 COLL VENOUS BLD VENIPUNCTURE: CPT | Performed by: INTERNAL MEDICINE

## 2021-08-13 PROCEDURE — 71046 X-RAY EXAM CHEST 2 VIEWS: CPT

## 2021-08-13 PROCEDURE — 85379 FIBRIN DEGRADATION QUANT: CPT | Performed by: INTERNAL MEDICINE

## 2021-08-13 PROCEDURE — 343N000001 HC RX 343: Performed by: INTERNAL MEDICINE

## 2021-08-13 PROCEDURE — 85025 COMPLETE CBC W/AUTO DIFF WBC: CPT | Performed by: PHYSICIAN ASSISTANT

## 2021-08-13 PROCEDURE — 84132 ASSAY OF SERUM POTASSIUM: CPT | Performed by: PHYSICIAN ASSISTANT

## 2021-08-13 PROCEDURE — 82947 ASSAY GLUCOSE BLOOD QUANT: CPT | Performed by: SURGERY

## 2021-08-13 PROCEDURE — 258N000003 HC RX IP 258 OP 636: Performed by: INTERNAL MEDICINE

## 2021-08-13 PROCEDURE — 250N000009 HC RX 250: Performed by: PHYSICIAN ASSISTANT

## 2021-08-13 PROCEDURE — 250N000011 HC RX IP 250 OP 636: Performed by: PHYSICIAN ASSISTANT

## 2021-08-13 RX ORDER — DEXTROSE MONOHYDRATE 25 G/50ML
25 INJECTION, SOLUTION INTRAVENOUS ONCE
Status: COMPLETED | OUTPATIENT
Start: 2021-08-13 | End: 2021-08-13

## 2021-08-13 RX ORDER — DEXTROSE MONOHYDRATE, SODIUM CHLORIDE, AND POTASSIUM CHLORIDE 50; 1.49; 4.5 G/1000ML; G/1000ML; G/1000ML
INJECTION, SOLUTION INTRAVENOUS CONTINUOUS
Status: DISCONTINUED | OUTPATIENT
Start: 2021-08-13 | End: 2021-08-14

## 2021-08-13 RX ADMIN — ACETAMINOPHEN 650 MG: 325 TABLET, FILM COATED ORAL at 17:53

## 2021-08-13 RX ADMIN — ACETAMINOPHEN 650 MG: 325 TABLET, FILM COATED ORAL at 12:13

## 2021-08-13 RX ADMIN — KIT FOR THE PREPARATION OF TECHNETIUM TC 99M ALBUMIN AGGREGATED 3.2 MILLICURIE: 2.5 INJECTION, POWDER, FOR SOLUTION INTRAVENOUS at 13:15

## 2021-08-13 RX ADMIN — LISINOPRIL 20 MG: 10 TABLET ORAL at 09:27

## 2021-08-13 RX ADMIN — DEXTROSE MONOHYDRATE 25 ML: 500 INJECTION PARENTERAL at 09:14

## 2021-08-13 RX ADMIN — PIPERACILLIN AND TAZOBACTAM 3.38 G: 3; .375 INJECTION, POWDER, FOR SOLUTION INTRAVENOUS at 05:45

## 2021-08-13 RX ADMIN — CYCLOBENZAPRINE 10 MG: 10 TABLET, FILM COATED ORAL at 09:27

## 2021-08-13 RX ADMIN — PIPERACILLIN AND TAZOBACTAM 3.38 G: 3; .375 INJECTION, POWDER, FOR SOLUTION INTRAVENOUS at 12:12

## 2021-08-13 RX ADMIN — ACETAMINOPHEN 650 MG: 325 TABLET, FILM COATED ORAL at 21:30

## 2021-08-13 RX ADMIN — ACETAMINOPHEN 650 MG: 325 TABLET, FILM COATED ORAL at 09:27

## 2021-08-13 RX ADMIN — NICOTINE 1 PATCH: 14 PATCH, EXTENDED RELEASE TRANSDERMAL at 09:28

## 2021-08-13 RX ADMIN — PIPERACILLIN AND TAZOBACTAM 3.38 G: 3; .375 INJECTION, POWDER, FOR SOLUTION INTRAVENOUS at 17:53

## 2021-08-13 RX ADMIN — IPRATROPIUM BROMIDE AND ALBUTEROL SULFATE 3 ML: .5; 3 SOLUTION RESPIRATORY (INHALATION) at 19:39

## 2021-08-13 RX ADMIN — CARVEDILOL 6.25 MG: 6.25 TABLET, FILM COATED ORAL at 09:27

## 2021-08-13 RX ADMIN — FLUTICASONE FUROATE AND VILANTEROL TRIFENATATE 1 PUFF: 200; 25 POWDER RESPIRATORY (INHALATION) at 09:29

## 2021-08-13 RX ADMIN — OXYCODONE HYDROCHLORIDE 10 MG: 5 TABLET ORAL at 12:12

## 2021-08-13 RX ADMIN — CARVEDILOL 6.25 MG: 6.25 TABLET, FILM COATED ORAL at 21:30

## 2021-08-13 RX ADMIN — OXYCODONE HYDROCHLORIDE 10 MG: 5 TABLET ORAL at 22:44

## 2021-08-13 RX ADMIN — IPRATROPIUM BROMIDE AND ALBUTEROL SULFATE 3 ML: .5; 3 SOLUTION RESPIRATORY (INHALATION) at 15:07

## 2021-08-13 RX ADMIN — ROSUVASTATIN CALCIUM 40 MG: 20 TABLET, FILM COATED ORAL at 21:30

## 2021-08-13 RX ADMIN — FAMOTIDINE 20 MG: 10 INJECTION, SOLUTION INTRAVENOUS at 05:45

## 2021-08-13 RX ADMIN — POTASSIUM CHLORIDE, DEXTROSE MONOHYDRATE AND SODIUM CHLORIDE: 150; 5; 450 INJECTION, SOLUTION INTRAVENOUS at 09:23

## 2021-08-13 RX ADMIN — VANCOMYCIN HYDROCHLORIDE 1250 MG: 5 INJECTION, POWDER, LYOPHILIZED, FOR SOLUTION INTRAVENOUS at 21:40

## 2021-08-13 RX ADMIN — FAMOTIDINE 20 MG: 10 INJECTION, SOLUTION INTRAVENOUS at 16:15

## 2021-08-13 RX ADMIN — LISINOPRIL 20 MG: 10 TABLET ORAL at 21:30

## 2021-08-13 RX ADMIN — SENNOSIDES 8.6 MG: 8.6 TABLET, FILM COATED ORAL at 18:39

## 2021-08-13 RX ADMIN — ASPIRIN 81 MG CHEWABLE TABLET 81 MG: 81 TABLET CHEWABLE at 09:27

## 2021-08-13 RX ADMIN — PIPERACILLIN AND TAZOBACTAM 3.38 G: 3; .375 INJECTION, POWDER, FOR SOLUTION INTRAVENOUS at 00:11

## 2021-08-13 RX ADMIN — ENOXAPARIN SODIUM 40 MG: 40 INJECTION SUBCUTANEOUS at 17:52

## 2021-08-13 RX ADMIN — OXYCODONE HYDROCHLORIDE 10 MG: 5 TABLET ORAL at 18:39

## 2021-08-13 RX ADMIN — CYCLOBENZAPRINE 10 MG: 10 TABLET, FILM COATED ORAL at 16:27

## 2021-08-13 RX ADMIN — IPRATROPIUM BROMIDE AND ALBUTEROL SULFATE 3 ML: .5; 3 SOLUTION RESPIRATORY (INHALATION) at 08:13

## 2021-08-13 RX ADMIN — CYCLOBENZAPRINE 10 MG: 10 TABLET, FILM COATED ORAL at 21:31

## 2021-08-13 ASSESSMENT — ACTIVITIES OF DAILY LIVING (ADL)
ADLS_ACUITY_SCORE: 14
ADLS_ACUITY_SCORE: 15
ADLS_ACUITY_SCORE: 14

## 2021-08-13 ASSESSMENT — MIFFLIN-ST. JEOR: SCORE: 1027.25

## 2021-08-13 NOTE — PROGRESS NOTES
WOC f/u for RLE:    Chart reviewed, noted Vascular Surgery now following pt for dehisced right thigh wound and has put in wound care orders.  No need for WOC to continue to follow at this time, will sign off.  Please reconsult if further concerns.

## 2021-08-13 NOTE — PROGRESS NOTES
"   08/13/21 1400   General Information   Patient is receptive to smoking cessation at this time Yes   Stage of Behavior Change   Patient's Stage of Behavior Change Action \"I am\"   Processes of Change   The following interventions were used (smoking cessation) Identify support system;Develop action plan;Review relapse prevention strategies   Motivation to Quit Scale (1-10) 8   Quit Attempt Date 08/10/21   Education/Recommendations   Education QUIT PLAN phone line;Stage-matched literature  (Help for smokers handout)   Treatment Time (Minutes) 8 minutes   Total Evaluation Time   Total Evaluation Time (Minutes) 8     "

## 2021-08-13 NOTE — PROGRESS NOTES
08/13/21 1450   Quick Adds   Type of Visit Initial PT Evaluation   Living Environment   People in home spouse;child(ifeanyi), adult   Current Living Arrangements house   Home Accessibility stairs to enter home   Number of Stairs, Main Entrance 3   Stair Railings, Main Entrance railings safe and in good condition   Transportation Anticipated family or friend will provide;car, drives self   Self-Care   Usual Activity Tolerance good   Current Activity Tolerance fair   Activity/Exercise/Self-Care Comment Patient was independent with mobility and ADLs prior to this admission. She has walkers from prior surgeries and a walking stick, but she does not typically need to use these.    Disability/Function   Hearing Difficulty or Deaf no   Wear Glasses or Blind no   Concentrating, Remembering or Making Decisions Difficulty no   Difficulty Communicating no   Fall history within last six months yes   Number of times patient has fallen within last six months 2   General Information   Onset of Illness/Injury or Date of Surgery 08/10/21   Referring Physician Krissy Flower PA-C   Patient/Family Therapy Goals Statement (PT) to go home   Pertinent History of Current Problem (include personal factors and/or comorbidities that impact the POC) Patient is 59 YO F admitted with abdominal pain. She was diagnosed with SBO and malnutrition. She is POD #2 s/p ex lap transitioned to open laparotomy with lysis of adhesions. PMH: PAD with R LE revascularization In July 2021, HTN, COPD, CAD, tobacco abuse, Charcot Breonna Tooth   Existing Precautions/Restrictions fall;abdominal   Weight-Bearing Status - LUE full weight-bearing   Weight-Bearing Status - RUE full weight-bearing   Weight-Bearing Status - LLE full weight-bearing   Weight-Bearing Status - RLE full weight-bearing   General Observations Patient in supine upon arrival, initially irritated that she has another interruption but agreeable to proceed with therapy evaluation. Activity  orders: ambulate 4x/day   Cognition   Orientation Status (Cognition) oriented x 3   Affect/Mental Status (Cognition) WFL   Follows Commands (Cognition) WFL   Pain Assessment   Patient Currently in Pain No   Integumentary/Edema   Integumentary/Edema Comments Abdominal binder in place, see nursing notes for abdominal incision   Posture    Posture Forward head position;Protracted shoulders   Range of Motion (ROM)   ROM Comment B LE AROM WFL   Strength   Strength Comments Generalized weakness observed during mobility, but formal MMT deferred due to abdominal surgery   Bed Mobility   Bed Mobility supine-sit;sit-supine   Supine-Sit [place] (Bed Mobility) supervision   Sit-Supine [place] (Bed Mobility) supervision   Transfers   Transfers sit-stand transfer   Sit-Stand Transfer   Sit-Stand [place] (Transfers) contact guard   Sit/Stand Transfer Comments From edge of bed, with support of IV pole upon standing   Gait/Stairs (Locomotion)   [place] Level (Gait) contact guard   Deviations/Abnormal Patterns (Gait) gait speed decreased;stride length decreased   Comment (Gait/Stairs) Patient ambulated approximately 15' in room with B UE support on IV pole, CGA for safety, mild unstaediness and feet in slight eversion noted.    Balance   Balance Comments Good sitting balance, independent with hygiene on toilet; CGA for standing without UE support   Sensory Examination   Sensory Perception patient reports no sensory changes   Clinical Impression   Criteria for Skilled Therapeutic Intervention yes, treatment indicated   PT Diagnosis (PT) impaired mobility   Influenced by the following impairments weakness, abdominal incision/precautions, decreased activity tolerance, impaired balance   Functional limitations due to impairments decreased independence with transfers and gait   Clinical Presentation Stable/Uncomplicated   Clinical Presentation Rationale stable vital signs on room air, clinical judgement   Clinical  Decision Making (Complexity) low complexity   Therapy Frequency (PT) Daily   Predicted Duration of Therapy Intervention (days/wks) 1 week   Planned Therapy Interventions (PT) balance training;bed mobility training;gait training;home exercise program;patient/family education;stair training;strengthening;transfer training;progressive activity/exercise;home program guidelines   Risk & Benefits of therapy have been explained evaluation/treatment results reviewed;care plan/treatment goals reviewed;risks/benefits reviewed;current/potential barriers reviewed;participants voiced agreement with care plan;participants included;patient   PT Discharge Planning    PT Discharge Recommendation (DC Rec) home with assist;home with home care physical therapy   PT Rationale for DC Rec Patient currently below baseline level of independence with mobility, but will have support from her adult son when she returns home. She also has FWW to use as needed. Anticipate patient will progress to safely return home at discharge, HHPT recommended to progress strength, endurance and independence with mobility as she will need to use AD for mobility.    Total Evaluation Time   Total Evaluation Time (Minutes) 6

## 2021-08-13 NOTE — PROGRESS NOTES
"Surgery    Hungry, cranky (self-reported)  Denies pain  +bowel function - gas  Walking some  Denies CP, dyspnea    Gen:  Awake, Alert, NAD  Blood pressure (!) 158/70, pulse 61, temperature 97.9  F (36.6  C), temperature source Oral, resp. rate 16, height 1.575 m (5' 2\"), weight 51.4 kg (113 lb 5.1 oz), SpO2 95 %, not currently breastfeeding.  Resp - Non-labored  Abdomen - soft, appropriately tender, non distended. Incisions healing appropriately without erythema or drainage.    A/P s/p laparoscopic --> open lysis of adhesions on 8/11/31 for closed loop small bowel obstruction    - ok to start clear liquids, may also have applesauce as requested  - encourage incentive spirometer use  - ambulate    Mars Carlos PA-C  Office: [phone]  Pager: [phone]    "

## 2021-08-13 NOTE — PLAN OF CARE
Alert and oriented x4. Vital signs stable on room air. SBA. Tolerating NPO/clear liquid diet. Lung sounds diminished. Bowel sounds hypoactive, passing flatus, no BM during shift, adequate urine output. Lap sites and midline incision CDI, open to air. Abdominal binder in place. Pain managed with PRN oxycodone and Scheduled tylenol. Denies nausea.

## 2021-08-13 NOTE — PROGRESS NOTES
[facility]  Hospitalist Progress Note  Name: Shirley Hendricks    MRN: [id number]  Physician:  John Carvalho DO, FHM (Text Page)  Securely message with the Vocera Web Console (learn more here)    Summary of Stay: Shirley Hendricks is a 60 year old female who was admitted on 8/10/2021.  Past medical history significant for hypertension, hyperlipidemia, COPD, CAD, SVT asymptomatic, PAD status post recent surgical revascularization of the right lower extremity in July 2021 for critical ischemia in extremity with history of revascularization, cholecystectomy, salpingectomy  admitted on 8/10/2021 with abdominal pain and small bowel obstruction.    Assessment & Plan      Closed loop small bowel obstruction secondary to omental adhesions:  Abd CT scan showed multiple dilated fluid-filled loops of mid small bowel in the lower abdomen have an appearance suspicious for a closed loop small bowel obstruction, an enlarged right common iliac chain lymph node and multiple enlarged bilateral inguinal lymph nodes have increased in size, and are indeterminate.  NG tube kinked and subsequently removed 8/12.  General surgery consultation appreciated.  She is S/p exploratory laparoscopy with laparoscopic lysis of adhesions converted to laparotomy with extensive lysis of adhesions (8/11).  -  Continue IVF, changed to include D5 today with hypoglycemia  -  NPO, will defer diet advance to surgical service.  She feels very hungry and wants to try to eat more.  -  Plavix remains on hold, resume when acceptable to surgical service from bleed risk standpoint    New Hypoglycemia 8/13:  -  Glu 40's this AM.  Not significantly symptomatic.  Changed IVF to include Dextrose and gave some D50.  Recheck glu improved.  Will defer diet advance to surgery.    Post-op Hypoxia  Right pleural effusion:  Patient has had some hypoxia for awhile during stay.  She appeared to developed acute more severe hypoxia when she got up  to use the bathroom.  RRT was called.  Chest Xray revealed new small right pleural effusion and right basilar atelectasis/infiltrate.  No pneumothorax.  Left lung clear.    -  On 8/12 Scheduled DuoNeb + Encourage IS and ambulation + Encourage flutter valve use.     -  I checked a D-Dimer and it is quite high.  While this may be inflammatory related to her ABD issues, I think some further PE eval is warranted.  She has a significant contrast allergy so will check V/Q scan.  Patient was agreeable with more imaging.     ADDENDUM:  V/Q low probability for PE.  Likely hypoxia/CXR changes more atelectasis.  Continue to encourage moving more and incentive spirometry.    Mild leukocytosis:  -Currently on Vanco/Zosyn.       Post-op anemia:  HGB seems to fluctuate when reviewing EMR.  Pre-op HGB was 12 post-op then dropped. Likely multifactorial with surgical blood loss as well as dilutional effect from IV fluids.    - Monitor, no overt bleeding noted currently.  Hemoglobin dipped again today without overt bleeding.  I will recheck hgb later today.       Right medial thigh wound  Failed to heal surgical wound from right lower extremity revascularization.    *[facility]ably [facility] by Dr. Montero 8/3/21 when she presented due to partial dehiscence and fluctuant area on the left shoulder surgical site with fluctuant area.                 --At that visit; thigh wound with noted dehiscence and some serous drainage but no purulence and was sutured.  Left shoulder fluctuant area with small amount of purulence (abscess) that was compressed/expressed and no need for systemic antibiotics.    -WOCN consult appreciated:               --Noted purulent liquid drainage upon assessment morning of 8/11.  Vascular surgery saw patient.  Appreciate Vascular surgery consult.  They recommended wound care and are following.  No surgery felt indicated currently.  Remains on Vanco/Zosyn.   I don't see any overt indication of MRSA.  I will  defer to vascular with follow-up whether they feel vanco needed.  Would favor discontinuing today if they do not feel indicated.         PAD   Status post above-the-knee popliteal to below the knee popliteal artery bypass using left upper extremity translocated cephalic vein for critical limb ischemia of extremity with history of revascularization on 7/6/2021 with [facility] surgery. She has been on dual antiplatelet therapy since then. I refer the interested reader to [facility] consultation note from 7/7/2021 for additional details of her PAD.  -Initially planned to resume PTA ASA and Plavix.   -Colleague earlier in stay discussed with general surgery (8/12).  Resumed on ASA and started Lovenox.  Hemoglobin dipped again today without overt bleeding.  I will recheck hgb later today.     Severe malnutrition  Unintentional 26 pound weight loss in the last 8 months and following recent vascular surgery and poor wound healing of the right lower extremity  -Appreciate dietitian consult.       Hypertension  BP in the urgency ranges 220/98 at home, PTA regimen includes lisinopril 20 mg BID and Coreg 6.25 mg BID.  Patient was also on amlodipine 5mg BID from 4/8/21-7/7/21, seems to have been stopped sometime in between.  -We will continue PTA lisinopril and coreg with hold parameters               --If BPs consistently running high today, can resume amlodipine  -PRN labetalol and hydralazine for SBP sustained greater than 180 mmHg     Hyperlipidemia  -Resume PTA Crestor.     CAD diffuse, moderate, nonobstructive   by 2009 coronary angiogram.  -Antihypertensive and lipid lower agents as above.    -Dual antiplatelet therapy as noted elsewhere.     Hyponatremia (Resolved)  Mild, asymptomatic, will check prior levels to determine baseline.  -NS IV fluids for insensible losses while NPO.     Likely COPD   Severity unknown, last seen at [facility] for lung science in 2014 for lung cancer screening but not felt to  actually have COPD at that time. Although she has continued to smoke since then. PFTs prior to that were inconclusive and she has had known mediastinal and axillary lymphadenopathy.  -Continue PTA inhalers.  -Please see plan above regarding hypoxia.       Tobacco use   Attempts to quit have thus far been unsuccessful but she is ready to try again.   -Nicotine replacement therapy.     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  No interventions.       Pulmonary nodule, incidental  Indeterminate 0.4 cm right lower lobe pulmonary nodule is new  since the previous exam.   -Patient will need to be reminded at discharge regarding this finding and follow-up.    -Outpatient follow-up in 12 months to reevaluate pulmonary nodule with [facility] pulmonology     Covid-19 screening  PCR is negative on 8/10/2021, Vaccinated.     History of SVT asymptomatic  Seen on zio patch as work up for lightheadedness  -Low threshold to start telemetry if any symptoms       COVID Status:  COVID-19 PCR Results    COVID-19 PCR Results 6/6/20 6/10/20 6/10/20 9/19/20 9/19/20 6/19/21 6/19/21 7/3/21 7/3/21 8/10/21     1900 1900 0930 0930 1034 1034 1042 1042    COVID-19 Virus PCR to [facility] - Result Not Detected Test received-See reflex to IDDL test SARS CoV2 (COVID-19) Virus RT-PCR  Test received-See reflex to IDDL test SARS CoV2 (COVID-19) Virus RT-PCR  Test received-See reflex to IDDL test SARS CoV2 (COVID-19) Virus RT-PCR  Test received-See reflex to IDDL test SARS CoV2 (COVID-19) Virus RT-PCR     COVID-19 Virus PCR to [facility] - Source Nasopharyngeal Nasopharyngeal  Nasopharyngeal  Nasopharyngeal  Nasopharyngeal     SARS-CoV-2 Virus Specimen Source   Nasopharyngeal  Nasopharyngeal  Nasopharyngeal  Nasopharyngeal    SARS-CoV-2 PCR Result   NEGATIVE  NEGATIVE  NEGATIVE  NEGATIVE    SARS CoV2 PCR          Negative      Comments are available for some flowsheets but are not being displayed.         COVID-19  Antibody Results, Testing for Immunity    COVID-19 Antibody Results, Testing for Immunity   No data to display.            Diet: NPO for Medical/Clinical Reasons Except for: Meds, Ice Chips, Other; Specify: sips of water  Diet    DVT Prophylaxis: Enoxaparin (Lovenox) SQ  Alvarez Catheter: Not present  Code Status: Full Code      Disposition Plan    Expect to likely need to stay through the weekend.     Entered: John Carvalho 08/13/2021, 9:09 AM       Interval History   Assumed care for the day, history reviewed.  Ms. Hendricks denies feeling worse with low glu today.  She feels hungry, would like to advance diet.  Tolerating ice chips.  Having flatus.  No chest pain.  Does not feel SOB in bed.  ABD pain controlled. No other new complaints.    -Data reviewed today: I reviewed all new labs and imaging reports over the last 24 hours. I personally reviewed no images or EKG's today.    Physical Exam   Temp: 97.6  F (36.4  C) Temp src: Axillary BP: 137/66 Pulse: 63   Resp: 18 SpO2: 97 % O2 Device: Nasal cannula Oxygen Delivery: 1 LPM  Vitals:    08/11/21 0605 08/12/21 0615 08/13/21 0550   Weight: 47 kg (103 lb 9.9 oz) 50.1 kg (110 lb 7.2 oz) 51.4 kg (113 lb 5.1 oz)     Vital Signs with Ranges  Temp:  [97.6  F (36.4  C)-98.1  F (36.7  C)] 97.6  F (36.4  C)  Pulse:  [57-73] 63  Resp:  [16-18] 18  BP: (102-151)/(50-82) 137/66  SpO2:  [91 %-98 %] 97 %  I/O last 3 completed shifts:  In: 1908.33 [P.O.:120; I.V.:1788.33]  Out: 450 [Urine:450]    GEN:  Alert, oriented x 3, appears comfortable, no overt distress.  HEENT:  Normocephalic/atraumatic, no scleral icterus, no nasal discharge, mouth moist.  CV:  Regular rate and rhythm, distant.  No rub.  LUNGS:  Clear to auscultation upper with mildly decreased breath sounds bases.  No wheezing/retractions.  Symmetric chest rise on inhalation noted.  ABD:  Active bowel sounds, soft, detailed surgical site exam deferred to surgical service.    EXT:  Trace edema.  No cyanosis.  Moving  extremities well on general exam.  SKIN:  Dry to touch, no exanthems noted in the visualized areas.    Medications     dextrose 5% and 0.45% NaCl + KCl 20 mEq/L         acetaminophen  650 mg Oral 4x Daily    Or     acetaminophen  650 mg Rectal 4x Daily     aspirin  81 mg Oral Daily     carvedilol  6.25 mg Oral BID     [Held by provider] clopidogrel  75 mg Oral Daily     cyclobenzaprine  10 mg Oral TID     dextrose  25 mL Intravenous Once     enoxaparin ANTICOAGULANT  40 mg Subcutaneous Q24H     famotidine  20 mg Intravenous Q12H     fluticasone-vilanterol  1 puff Inhalation Daily     ipratropium - albuterol 0.5 mg/2.5 mg/3 mL  3 mL Nebulization 4x daily     lisinopril  20 mg Oral BID     nicotine  1 patch Transdermal Daily     nicotine   Transdermal Q8H     piperacillin-tazobactam  3.375 g Intravenous Q6H     rosuvastatin  40 mg Oral At Bedtime     sodium chloride (PF)  3 mL Intracatheter Q8H     vancomycin  1,250 mg Intravenous Q24H     Data     Recent Labs   Lab 08/13/21  0744 08/12/21  0643 08/11/21  0821   WBC 6.3 11.8* 11.3*   HGB 8.5* 10.4* 12.0   HCT 27.2* 32.6* 36.8   MCV 94 91 89   * 144* 174       Recent Labs   Lab 08/13/21  1034 08/13/21  0744 08/12/21  1515 08/12/21  0643 08/11/21  0821 08/10/21  0833   NA  --  136  --  135 135 131*   POTASSIUM  --  3.8  3.8  --  4.4 4.2 4.1   CHLORIDE  --  106  --  104 104 100   CO2  --  19*  --  25 21 26   ANIONGAP  --  11  --  6 10 5   * 45*  46*  --  105* 95 94   BUN  --  20  --  17 16 12   CR  --  0.73  0.72 0.83 0.84 0.76 0.65   GFRESTIMATED  --  89  90 76 75 84 >90   SONIA  --  8.4*  --  8.4* 8.9 9.5   MAG  --  2.1  --  2.4* 1.8  --    PROTTOTAL  --   --   --   --   --  7.7   ALBUMIN  --   --   --   --   --  3.6   BILITOTAL  --   --   --   --   --  0.5   ALKPHOS  --   --   --   --   --  70   AST  --   --   --   --   --  19   ALT  --   --   --   --   --  24       Recent Results (from the past 24 hour(s))   XR Chest Port 1 View    Narrative    XR  CHEST PORT 1 VIEW 8/12/2021 1:01 PM    HISTORY: increasing hypoxia    COMPARISON: 1/5/2021 and CT of the abdomen 8/10/2021      Impression    IMPRESSION: New small right pleural effusion and right basilar  atelectasis/infiltrate. No pneumothorax. The left lung is clear.  Normal heart size. Tortuous aorta with atherosclerotic calcifications.  Cholecystectomy clips.    BRANDON ALBERTS MD         SYSTEM ID:  [id number]

## 2021-08-13 NOTE — PLAN OF CARE
Alert and oriented x4. Vital signs stable on 1-2L O2 via NC. Patient up with SBA. Patient on NPO ex. Meds, ice chips and sips of water. Lungs sounds clear. BS hypoactive. Flatus +. BM-. Voiding adequately. Lap sites x3 and midline incision with liquid bandage. Pain managed with Oxycodone 10mg PO. Denies nausea. IVF infusing.

## 2021-08-14 LAB
ANION GAP SERPL CALCULATED.3IONS-SCNC: 3 MMOL/L (ref 3–14)
BUN SERPL-MCNC: 10 MG/DL (ref 7–30)
CALCIUM SERPL-MCNC: 8.1 MG/DL (ref 8.5–10.1)
CHLORIDE BLD-SCNC: 108 MMOL/L (ref 94–109)
CO2 SERPL-SCNC: 21 MMOL/L (ref 20–32)
CREAT SERPL-MCNC: 0.63 MG/DL (ref 0.52–1.04)
ERYTHROCYTE [DISTWIDTH] IN BLOOD BY AUTOMATED COUNT: 12.9 % (ref 10–15)
GFR SERPL CREATININE-BSD FRML MDRD: >90 ML/MIN/1.73M2
GLUCOSE BLD-MCNC: 84 MG/DL (ref 70–99)
GLUCOSE BLDC GLUCOMTR-MCNC: 100 MG/DL (ref 70–99)
GLUCOSE BLDC GLUCOMTR-MCNC: 81 MG/DL (ref 70–99)
GLUCOSE BLDC GLUCOMTR-MCNC: 87 MG/DL (ref 70–99)
GLUCOSE BLDC GLUCOMTR-MCNC: 89 MG/DL (ref 70–99)
GLUCOSE BLDC GLUCOMTR-MCNC: 99 MG/DL (ref 70–99)
HCT VFR BLD AUTO: 31.1 % (ref 35–47)
HGB BLD-MCNC: 9.5 G/DL (ref 11.7–15.7)
MCH RBC QN AUTO: 28.6 PG (ref 26.5–33)
MCHC RBC AUTO-ENTMCNC: 30.5 G/DL (ref 31.5–36.5)
MCV RBC AUTO: 94 FL (ref 78–100)
PLATELET # BLD AUTO: 156 10E3/UL (ref 150–450)
POTASSIUM BLD-SCNC: 3.9 MMOL/L (ref 3.4–5.3)
RBC # BLD AUTO: 3.32 10E6/UL (ref 3.8–5.2)
SODIUM SERPL-SCNC: 132 MMOL/L (ref 133–144)
WBC # BLD AUTO: 6.1 10E3/UL (ref 4–11)

## 2021-08-14 PROCEDURE — 85027 COMPLETE CBC AUTOMATED: CPT | Performed by: INTERNAL MEDICINE

## 2021-08-14 PROCEDURE — 250N000009 HC RX 250: Performed by: PHYSICIAN ASSISTANT

## 2021-08-14 PROCEDURE — 99233 SBSQ HOSP IP/OBS HIGH 50: CPT | Performed by: INTERNAL MEDICINE

## 2021-08-14 PROCEDURE — 80048 BASIC METABOLIC PNL TOTAL CA: CPT | Performed by: INTERNAL MEDICINE

## 2021-08-14 PROCEDURE — 250N000013 HC RX MED GY IP 250 OP 250 PS 637: Performed by: PHYSICIAN ASSISTANT

## 2021-08-14 PROCEDURE — 36415 COLL VENOUS BLD VENIPUNCTURE: CPT | Performed by: INTERNAL MEDICINE

## 2021-08-14 PROCEDURE — 99231 SBSQ HOSP IP/OBS SF/LOW 25: CPT | Mod: 24 | Performed by: SURGERY

## 2021-08-14 PROCEDURE — 120N000001 HC R&B MED SURG/OB

## 2021-08-14 PROCEDURE — 258N000003 HC RX IP 258 OP 636: Performed by: INTERNAL MEDICINE

## 2021-08-14 PROCEDURE — 250N000011 HC RX IP 250 OP 636: Performed by: PHYSICIAN ASSISTANT

## 2021-08-14 RX ORDER — NICOTINE POLACRILEX 4 MG
15-30 LOZENGE BUCCAL
Status: DISCONTINUED | OUTPATIENT
Start: 2021-08-14 | End: 2021-08-16 | Stop reason: HOSPADM

## 2021-08-14 RX ORDER — ACETAMINOPHEN 160 MG
TABLET,DISINTEGRATING ORAL EVERY 8 HOURS PRN
Status: DISCONTINUED | OUTPATIENT
Start: 2021-08-14 | End: 2021-08-16 | Stop reason: HOSPADM

## 2021-08-14 RX ORDER — IPRATROPIUM BROMIDE AND ALBUTEROL SULFATE 2.5; .5 MG/3ML; MG/3ML
3 SOLUTION RESPIRATORY (INHALATION) EVERY 4 HOURS PRN
Status: DISCONTINUED | OUTPATIENT
Start: 2021-08-14 | End: 2021-08-16 | Stop reason: HOSPADM

## 2021-08-14 RX ORDER — DEXTROSE MONOHYDRATE 25 G/50ML
25-50 INJECTION, SOLUTION INTRAVENOUS
Status: DISCONTINUED | OUTPATIENT
Start: 2021-08-14 | End: 2021-08-16 | Stop reason: HOSPADM

## 2021-08-14 RX ADMIN — FAMOTIDINE 20 MG: 10 INJECTION, SOLUTION INTRAVENOUS at 17:00

## 2021-08-14 RX ADMIN — OXYCODONE HYDROCHLORIDE 10 MG: 5 TABLET ORAL at 12:36

## 2021-08-14 RX ADMIN — FAMOTIDINE 20 MG: 10 INJECTION, SOLUTION INTRAVENOUS at 05:43

## 2021-08-14 RX ADMIN — OXYCODONE HYDROCHLORIDE 10 MG: 5 TABLET ORAL at 18:16

## 2021-08-14 RX ADMIN — ACETAMINOPHEN 650 MG: 325 TABLET, FILM COATED ORAL at 17:00

## 2021-08-14 RX ADMIN — CARVEDILOL 6.25 MG: 6.25 TABLET, FILM COATED ORAL at 08:54

## 2021-08-14 RX ADMIN — ENOXAPARIN SODIUM 40 MG: 40 INJECTION SUBCUTANEOUS at 17:01

## 2021-08-14 RX ADMIN — CYCLOBENZAPRINE 10 MG: 10 TABLET, FILM COATED ORAL at 08:54

## 2021-08-14 RX ADMIN — NICOTINE 1 PATCH: 14 PATCH, EXTENDED RELEASE TRANSDERMAL at 08:55

## 2021-08-14 RX ADMIN — CYCLOBENZAPRINE 10 MG: 10 TABLET, FILM COATED ORAL at 17:00

## 2021-08-14 RX ADMIN — POLYETHYLENE GLYCOL 3350 17 G: 17 POWDER, FOR SOLUTION ORAL at 18:16

## 2021-08-14 RX ADMIN — PIPERACILLIN AND TAZOBACTAM 3.38 G: 3; .375 INJECTION, POWDER, FOR SOLUTION INTRAVENOUS at 18:16

## 2021-08-14 RX ADMIN — ACETAMINOPHEN 650 MG: 325 TABLET, FILM COATED ORAL at 08:54

## 2021-08-14 RX ADMIN — ACETAMINOPHEN 650 MG: 325 TABLET, FILM COATED ORAL at 22:21

## 2021-08-14 RX ADMIN — PIPERACILLIN AND TAZOBACTAM 3.38 G: 3; .375 INJECTION, POWDER, FOR SOLUTION INTRAVENOUS at 12:36

## 2021-08-14 RX ADMIN — ASPIRIN 81 MG CHEWABLE TABLET 81 MG: 81 TABLET CHEWABLE at 08:54

## 2021-08-14 RX ADMIN — PIPERACILLIN AND TAZOBACTAM 3.38 G: 3; .375 INJECTION, POWDER, FOR SOLUTION INTRAVENOUS at 05:43

## 2021-08-14 RX ADMIN — CARVEDILOL 6.25 MG: 6.25 TABLET, FILM COATED ORAL at 20:29

## 2021-08-14 RX ADMIN — ACETAMINOPHEN 650 MG: 325 TABLET, FILM COATED ORAL at 12:36

## 2021-08-14 RX ADMIN — LISINOPRIL 20 MG: 10 TABLET ORAL at 08:54

## 2021-08-14 RX ADMIN — DEXTROSE AND SODIUM CHLORIDE: 5; 900 INJECTION, SOLUTION INTRAVENOUS at 12:36

## 2021-08-14 RX ADMIN — PIPERACILLIN AND TAZOBACTAM 3.38 G: 3; .375 INJECTION, POWDER, FOR SOLUTION INTRAVENOUS at 00:35

## 2021-08-14 RX ADMIN — LISINOPRIL 20 MG: 10 TABLET ORAL at 20:29

## 2021-08-14 RX ADMIN — ROSUVASTATIN CALCIUM 40 MG: 20 TABLET, FILM COATED ORAL at 22:21

## 2021-08-14 RX ADMIN — SENNOSIDES 8.6 MG: 8.6 TABLET, FILM COATED ORAL at 18:16

## 2021-08-14 RX ADMIN — FLUTICASONE FUROATE AND VILANTEROL TRIFENATATE 1 PUFF: 200; 25 POWDER RESPIRATORY (INHALATION) at 08:55

## 2021-08-14 RX ADMIN — PIPERACILLIN AND TAZOBACTAM 3.38 G: 3; .375 INJECTION, POWDER, FOR SOLUTION INTRAVENOUS at 23:53

## 2021-08-14 RX ADMIN — CYCLOBENZAPRINE 10 MG: 10 TABLET, FILM COATED ORAL at 22:21

## 2021-08-14 ASSESSMENT — ACTIVITIES OF DAILY LIVING (ADL)
ADLS_ACUITY_SCORE: 14
ADLS_ACUITY_SCORE: 15
ADLS_ACUITY_SCORE: 14

## 2021-08-14 ASSESSMENT — MIFFLIN-ST. JEOR: SCORE: 1070.25

## 2021-08-14 NOTE — PLAN OF CARE
1900h-0700h: AOx4. O2Sat 95% on RA. Diminished breath sound on lower lobes. Tolerating clear liquid diet, denies n/v. Voiding adequately, hypoactive BS x4. Ambulated to BR w/ SBA. Midline incision on abdomen & x3 lap sites w/ liquid bandage & RIN. Pedal pulses palpable, w/ baseline numbness & tingling. D5 1/2 NS + 20 meqs at 100 ml/hr infusing on L hand. Abdominal pain of 6/10 managed w/ PRN oxycodone & was effective.     0548h: /85mmHg, denies symptoms.

## 2021-08-14 NOTE — PROGRESS NOTES
"[facility]  GENERAL SURGERY Progress Note    Admission Date: 8/10/2021  8/14/2021         Assessment and Plan:     Shirley Hendricks is a 60 year old female with PMHx of COPD, HLD, HTN, CAD, PAD s/p revascularization of RLE in 07/2021 who presented with small bowel obstruction, now s/p laparoscopic converted to open lysis of adhesions on 8/11/21 for closed loop small bowel obstruction. Has been passing gas but no BM yet, tolerating clear liquid diet. Abdomen slightly distended still    - Clear liquid diet  - Wait to advance diet until further return of bowel function in setting of ongoing abdominal distension  - Pain control with tylenol and oxycodond  - Bowel regimen with senna, miralax  - If no BM today, plan for suppository tomorrow               Interval History:     Still with some abdominal pain, stable and not worsening. Denies fevers or chills. No nausea or emesis, passing gas but no bowel movement yet. Hopeful she can have more to eat soon                     Physical Exam:   Blood pressure (!) 154/85, pulse 72, temperature 98.2  F (36.8  C), temperature source Oral, resp. rate 16, height 1.575 m (5' 2\"), weight 51.4 kg (113 lb 5.1 oz), SpO2 98 %, not currently breastfeeding.  I/O last 3 completed shifts:  In: 831 [P.O.:160; I.V.:671]  Out: 1250 [Urine:1250]  Constitutional:  Awake, alert, oriented, and in no apparent distress.   Lungs: No increased work of breathing on room air   Cardiovascular: Regular rate and rhythm   Abdomen: Soft, slightly distended, appropriately tender at incision(s)   Wound(s): Clean, dry, and intact. No erythema or drainage.    Extremities: No edema or calf tenderness. +SCDs          Data:     Recent Labs   Lab Test 08/13/21  1700 08/13/21  0744 08/12/21  0643 08/11/21  0821   WBC  --  6.3 11.8* 11.3*   HGB 8.3* 8.5* 10.4* 12.0   HCT  --  27.2* 32.6* 36.8   PLT  --  134* 144* 174      Recent Labs   Lab Test 08/13/21  0744 08/12/21  1515 08/12/21  0643 " 08/11/21  0821     --  135 135   POTASSIUM 3.8  3.8  --  4.4 4.2   CHLORIDE 106  --  104 104   CO2 19*  --  25 21   BUN 20  --  17 16   CR 0.73  0.72 0.83 0.84 0.76     Recent Labs   Lab Test 08/10/21  0833 07/12/21  1258 01/05/21  1119 06/10/20  1243 02/01/19  0845 08/14/17  1028   BILITOTAL 0.5 0.4 0.3 0.5 0.6 0.3   ALT 24 28 25 30 75* 37   AST 19 38 17 21 39 22   ALKPHOS 70 72 84 82 63 66   AMYLASE  --   --   --   --   --  46   LIPASE 132  --   --  63* 101 135      Recent Labs   Lab Test 09/24/20  0739 05/10/16  1208 04/21/16  1400   INR 1.01 0.95 1.02   PTT  --   --  25     Recent Labs   Lab Test 08/13/21  0744 08/12/21  0643 08/11/21  0821 07/09/21  0554 07/08/21  0647 07/08/21  0647 09/13/18  0936   SONIA 8.4* 8.4* 8.9 8.6   < > 8.3*  --    PHOS  --   --   --  4.0  --  3.5 4.3   MAG 2.1 2.4* 1.8 1.7  --  1.7 1.8    < > = values in this interval not displayed.     Recent Labs   Lab Test 08/13/21  0744 08/12/21  0643 08/11/21  0821 08/10/21  0833 07/12/21  1258 06/21/21  1411 01/05/21  1119 02/21/19  1111 02/04/19  0935 02/01/19  1118   ANIONGAP 11 6 10 5 4   < > 3  --   --   --    PROTEIN  --   --   --   --   --   --   --   --  Negative Negative   ALBUMIN  --   --   --  3.6 2.9*  --  3.6   < >  --   --     < > = values in this interval not displayed.       Roman Amezquita  General Surgery Resident - [id number]  [phone]

## 2021-08-14 NOTE — PROGRESS NOTES
[facility]    Medicine Progress Note - Hospitalist Service        Date of Admission:  8/10/2021  8:18 AM    Assessment & Plan:   Shirley Hendricks is a 60 year old female with medical history significant for hypertension, hyperlipidemia, COPD, CAD, SVT asymptomatic, PAD status post recent surgical revascularization of the right lower extremity in July 2021 for critical ischemia in extremity with history of revascularization, cholecystectomy, salpingectomy admitted on 8/10/2021 with abdominal pain and small bowel obstruction.    Closed loop small bowel obstruction s/p initial laparoscopy converted to open laparotomy with extensive lysis of adhesions  -Abdominal CT scan showed multiple dilated fluid-filled loops of mid small bowel in the lower abdomen have an appearance suspicious for a closed loop small bowel obstruction, an enlarged right common iliac chain lymph node and multiple enlarged bilateral inguinal lymph nodes have increased in size, and are indeterminate.    -General surgery following. She is S/p exploratory laparoscopy with laparoscopic lysis of adhesions converted to laparotomy with extensive lysis of adhesions (8/11).  -Continue IV fluids, currently on clear liquid, defer diet advancement to general surgery.  -Plavix remains on hold, resume when acceptable to surgical service from bleed risk standpoint.  Continues on aspirin 81 mg daily     New Hypoglycemia 8/13:  -Blood sugar in the 40s in the morning of 8/13.  Apparently asymptomatic at that time.  -Continue D5 half-normal saline until oral intake improves   -Continue to monitor hypoglycemia  -Hypoglycemia protocol in place    Post-op Hypoxia most likely due to atelectasis  Right pleural effusion  -Patient had issues with hypoxia post surgery, RRT was called on 8/12.  Patient hypoxic to the 80s  -Chest Xray revealed new small right pleural effusion and right basilar atelectasis/infiltrate.  No pneumothorax.    -VQ scan low  probability for PE  -Hypoxia much improved, now on 1 L oxygen via nasal cannula  -Most likely cause for atelectasis is postop atelectasis  -Continue aggressive incentive spirometry.  Noted patient also on Zosyn, will complete a 5-day course tomorrow.  -Discontinue vancomycin at this no evidence of resistant gram-positive organism     Post-op anemia:  -Pre-op HGB was 12   -Postop hemoglobin has been between 8-9.5, likely multifactorial with surgical blood loss as well as dilutional effect from IV fluids.    -Monitor, no overt bleeding noted currently.      Right medial thigh wound  Failed to heal surgical wound from right lower extremity revascularization.    *Notably seen [facility] by Dr. Lozano 8/3/21 when she presented due to partial dehiscence and fluctuant area on the left shoulder surgical site with fluctuant area.    -At that visit; thigh wound with noted dehiscence and some serous drainage but no purulence and was sutured.   -WOCN consult appreciated:  -Patient apparently had purulent liquid drainage upon assessment morning of 8/11.    -Currently on Zosyn(probably for multiple views including leukocytosis/atelectasis/infiltrate/wound) and vancomycin  -Discussed with Dr. Lozano this morning, he does not see the need for IV antibiotics for the wound, no concerns for active infection at this time.  Discontinue vancomycin.  As mentioned above will complete 5 days of Zosyn tomorrow will discontinue after tomorrow's dose.    PAD   Status post above-the-knee popliteal to below the knee popliteal artery bypass using left upper extremity translocated cephalic vein for critical limb ischemia of extremity with history of revascularization on 7/6/2021 with [facility] surgery. She has been on dual antiplatelet therapy since then. I refer the interested reader to [facility] consultation note from 7/7/2021 for additional details of her PAD.  -Initially planned to resume PTA ASA and Plavix.   -Colleague  earlier in stay discussed with general surgery (8/12).  Resumed on ASA and started Lovenox.  -Hemoglobin is stable at the moment, will defer resumption of Plavix to general surgery.     Severe malnutrition  Unintentional 26 pound weight loss in the last 8 months and following recent vascular surgery and poor wound healing of the right lower extremity  -Appreciate dietitian consult.       Hypertension  BP in the urgency ranges 220/98 at home, PTA regimen includes lisinopril 20 mg BID and Coreg 6.25 mg BID.  Patient was also on amlodipine 5mg BID from 4/8/21-7/7/21, seems to have been stopped sometime in between.  -Continue prior to admission lisinopril and Coreg             -PRN labetalol and hydralazine for SBP sustained greater than 180 mmHg     Hyperlipidemia  -Continue PTA Crestor.     CAD diffuse, moderate, nonobstructive   by 2009 coronary angiogram.  -Antihypertensive and lipid lower agents as above.    -Dual antiplatelet therapy as noted above.     Mild hyponatremia  -Sodium again low at 132, change half-normal saline to D5, 0.9% saline     Likely COPD   Severity unknown, last seen at [facility] for lung science in 2014 for lung cancer screening but not felt to actually have COPD at that time. Although she has continued to smoke since then. PFTs prior to that were inconclusive and she has had known mediastinal and axillary lymphadenopathy.  -Continue PTA inhalers.     Tobacco use   Attempts to quit have thus far been unsuccessful but she is ready to try again.   -Nicotine replacement therapy.     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  No interventions.       Pulmonary nodule, incidental  Indeterminate 0.4 cm right lower lobe pulmonary nodule is new  since the previous exam.   -Patient will need to be reminded at discharge regarding this finding and follow-up.    -Outpatient follow-up in 12 months to reevaluate pulmonary nodule with [facility] pulmonology     Covid-19  "screening  PCR is negative on 8/10/2021, Vaccinated.     History of SVT asymptomatic  Seen on zio patch as work up for lightheadedness  -Low threshold to start telemetry if any symptoms      Diet: Diet  Clear Liquid Diet     DVT Prophylaxis: Enoxaparin (Lovenox) SQ   Alvarez Catheter: Not present  Code Status: Full Code     Disposition Plan    Expected discharge: 2-3 days, pending return of bowel function, clinical progress  Entered: Vadim Ramirez MD 08/14/2021, 10:49 AM        The patient's care was discussed with the Bedside Nurse, Patient and Vascular surgery.    Vadim Ramirez MD  Hospitalist Service  [facility]  Text Page 7AM-6PM  Securely message with the Voc[employer] Web Console (learn more here)  Text page via [employer] Paging/Directory    ______________________________________________________________________    Interval History   Tolerating clear liquids, overall having ongoing issues with abdominal pain.  She has some flatus but no stool.  Afebrile.  Oxygenation stable at 1 L.  Hemoglobin stable.  No nausea or vomiting.    Data reviewed today: I reviewed all medications, new labs and imaging results over the last 24 hours. I personally reviewed no images or EKG's today.    Physical Exam   Vital signs:  Temp: 98  F (36.7  C) Temp src: Oral BP: (!) 164/80 Pulse: (!) 127   Resp: 16 SpO2: 92 % O2 Device: Nasal cannula Oxygen Delivery: 1 LPM Height: 157.5 cm (5' 2\") Weight: 55.7 kg (122 lb 12.7 oz)  Estimated body mass index is 22.46 kg/m  as calculated from the following:    Height as of this encounter: 1.575 m (5' 2\").    Weight as of this encounter: 55.7 kg (122 lb 12.7 oz).      Wt Readings from Last 2 Encounters:   08/14/21 55.7 kg (122 lb 12.7 oz)   07/07/21 48.1 kg (106 lb)       Gen: AAOX3, NAD, comfortable  HEENT: no pallor  Resp: Few crackles at the bases bilaterally, normal effort of breathing  CVS: RRR, no murmur  Abd/GI: Surgical incision in the central abdomen, without any " drainage or discharge.  Bowel sounds normoactive.  Skin: Warm, dry no rashes  MSK: Right thigh with open wound, some drainage.  No pedal edema.  Neuro- CN- intact. No focal deficits.  Spontaneously moving all 4 extremities      Data   Recent Labs   Lab 08/14/21  0844 08/14/21  0714 08/14/21  0135 08/13/21  1700 08/13/21  0744 08/12/21  1515 08/12/21  0643 08/12/21  0643 08/10/21  1452 08/10/21  0833   WBC 6.1  --   --   --  6.3  --   --  11.8*   < > 8.3   HGB 9.5*  --   --  8.3* 8.5*  --   --  10.4*   < > 12.1   MCV 94  --   --   --  94  --   --  91   < > 89     --   --   --  134*  --   --  144*   < > 198   *  --   --   --  136  --   --  135   < > 131*   POTASSIUM 3.9  --   --   --  3.8  3.8  --   --  4.4   < > 4.1   CHLORIDE 108  --   --   --  106  --   --  104   < > 100   CO2 21  --   --   --  19*  --   --  25   < > 26   BUN 10  --   --   --  20  --   --  17   < > 12   CR 0.63  --   --   --  0.73  0.72 0.83  --  0.84  --  0.65   ANIONGAP 3  --   --   --  11  --   --  6   < > 5   SONIA 8.1*  --   --   --  8.4*  --   --  8.4*   < > 9.5   GLC 84 89 100*  --  45*  46*  --    < > 105*   < > 94   ALBUMIN  --   --   --   --   --   --   --   --   --  3.6   PROTTOTAL  --   --   --   --   --   --   --   --   --  7.7   BILITOTAL  --   --   --   --   --   --   --   --   --  0.5   ALKPHOS  --   --   --   --   --   --   --   --   --  70   ALT  --   --   --   --   --   --   --   --   --  24   AST  --   --   --   --   --   --   --   --   --  19   LIPASE  --   --   --   --   --   --   --   --   --  132    < > = values in this interval not displayed.       Recent Results (from the past 24 hour(s))   XR Chest 2 Views    Narrative    XR CHEST 2 VW  8/13/2021 11:44 AM       INDICATION: PE suspected, low/intermediate prob, positive D-dimer;  hypoxia  COMPARISON: 8/12/2021       Impression    IMPRESSION: Small bilateral pleural effusions with adjacent  atelectasis in each lung base. Lungs are otherwise clear. Heart  size  normal.    RAGINI DEVINE MD         SYSTEM ID:  [id number]   NM Lung Scan Perfusion Particulate    Narrative    EXAM: NM LUNG SCAN PERFUSION PARTICULATE  LOCATION: [facility]  DATE/TIME: 8/13/2021 1:16 PM    INDICATION: PE suspected, low/intermediate prob, positive D-dimer  COMPARISON: CXR 08/13/2021  TECHNIQUE: 3.2 mCi technetium-99m MAA, IV. Standard lung perfusion imaging.    FINDINGS: Mild nonsegmental areas of photopenia in the lower lungs from underlying pleural effusions and atelectasis depicted radiographically. Normal perfusion to both lungs elsewhere. No segmental perfusion defects.      Impression    IMPRESSION:   Negative for pulmonary embolism.     Medications     dextrose 5% and 0.45% NaCl + KCl 20 mEq/L 50 mL/hr at 08/14/21 0854       acetaminophen  650 mg Oral 4x Daily    Or     acetaminophen  650 mg Rectal 4x Daily     aspirin  81 mg Oral Daily     carvedilol  6.25 mg Oral BID     [Held by provider] clopidogrel  75 mg Oral Daily     cyclobenzaprine  10 mg Oral TID     enoxaparin ANTICOAGULANT  40 mg Subcutaneous Q24H     famotidine  20 mg Intravenous Q12H     fluticasone-vilanterol  1 puff Inhalation Daily     lisinopril  20 mg Oral BID     nicotine  1 patch Transdermal Daily     nicotine   Transdermal Q8H     piperacillin-tazobactam  3.375 g Intravenous Q6H     rosuvastatin  40 mg Oral At Bedtime     sodium chloride (PF)  3 mL Intracatheter Q8H     vancomycin  1,250 mg Intravenous Q24H

## 2021-08-14 NOTE — PLAN OF CARE
Pt up with SBA.  Pain managed with Oxycodone.  Hypertensive; otherwise, VSS on RA.  A/Ox4.  Abdominal incisions CDI; open to air.  Abdominal binder in place.  Baseline neuropathy to all extremities.  .  Good PO intake; no nausea.  Passing flatus.  Voiding adequately.

## 2021-08-14 NOTE — PROGRESS NOTES
Vascular Surgery Progress Note    S: Very comfortable.  Tolerating liquids.  Some flatus but no stool yet.    O:   Vitals:  BP  Min: 129/62  Max: 160/72  Temp  Av.8  F (36.6  C)  Min: 97.4  F (36.3  C)  Max: 98.2  F (36.8  C)  Pulse  Av.1  Min: 59  Max: 72  I/O last 3 completed shifts:  In: 831 [P.O.:160; I.V.:671]  Out: 1000 [Urine:1000]    Physical Exam: Alert and appropriate.  Very comfortable.    Right thigh wound= clean.    +3 DP pulses bilaterally.      Assessment/Plan: #1.  Clinically improving following laparoscopy / open surgery for SBO.  Gradually increasing diet.  Decrease IV fluids.       #2.  Revision right bypass graft and old left common operative site.  Functioning well with good distal pulses.  Right distal thigh wound dehiscence with known about.  Plans daily with peroxide and Q-tips.  Patient is able to do this.     #3.  On chronic aspirin/Plavix.  Plavix been on hold since surgery been on Lovenox.  Restart Plavix once Lovenox discontinued.      #4.  Chest x-ray yesterday revealed bilateral pleural effusions.  CT scan negative for PE.  Aggressive pulmonary toilet with signs of       #5.  Hemoglobin = 8.3.  Initiate iron    Plan : Will be discharged pending general surgery evaluation.      Wm. Blake MD

## 2021-08-15 ENCOUNTER — APPOINTMENT (OUTPATIENT)
Dept: PHYSICAL THERAPY | Facility: CLINIC | Age: 61
DRG: 335 | End: 2021-08-15
Payer: COMMERCIAL

## 2021-08-15 LAB
ANION GAP SERPL CALCULATED.3IONS-SCNC: 3 MMOL/L (ref 3–14)
BUN SERPL-MCNC: 8 MG/DL (ref 7–30)
CALCIUM SERPL-MCNC: 8.3 MG/DL (ref 8.5–10.1)
CHLORIDE BLD-SCNC: 108 MMOL/L (ref 94–109)
CO2 SERPL-SCNC: 24 MMOL/L (ref 20–32)
CREAT SERPL-MCNC: 0.64 MG/DL (ref 0.52–1.04)
GFR SERPL CREATININE-BSD FRML MDRD: >90 ML/MIN/1.73M2
GLUCOSE BLD-MCNC: 81 MG/DL (ref 70–99)
GLUCOSE BLDC GLUCOMTR-MCNC: 88 MG/DL (ref 70–99)
GLUCOSE BLDC GLUCOMTR-MCNC: 96 MG/DL (ref 70–99)
GLUCOSE BLDC GLUCOMTR-MCNC: 96 MG/DL (ref 70–99)
HGB BLD-MCNC: 9.4 G/DL (ref 11.7–15.7)
PLATELET # BLD AUTO: 198 10E3/UL (ref 150–450)
POTASSIUM BLD-SCNC: 3.9 MMOL/L (ref 3.4–5.3)
SODIUM SERPL-SCNC: 135 MMOL/L (ref 133–144)

## 2021-08-15 PROCEDURE — 250N000011 HC RX IP 250 OP 636: Performed by: PHYSICIAN ASSISTANT

## 2021-08-15 PROCEDURE — 250N000013 HC RX MED GY IP 250 OP 250 PS 637: Performed by: INTERNAL MEDICINE

## 2021-08-15 PROCEDURE — 85049 AUTOMATED PLATELET COUNT: CPT | Performed by: PHYSICIAN ASSISTANT

## 2021-08-15 PROCEDURE — 250N000011 HC RX IP 250 OP 636: Performed by: INTERNAL MEDICINE

## 2021-08-15 PROCEDURE — 97530 THERAPEUTIC ACTIVITIES: CPT | Mod: GP | Performed by: PHYSICAL THERAPIST

## 2021-08-15 PROCEDURE — 250N000013 HC RX MED GY IP 250 OP 250 PS 637: Performed by: SURGERY

## 2021-08-15 PROCEDURE — 250N000013 HC RX MED GY IP 250 OP 250 PS 637: Performed by: PHYSICIAN ASSISTANT

## 2021-08-15 PROCEDURE — 85018 HEMOGLOBIN: CPT | Performed by: INTERNAL MEDICINE

## 2021-08-15 PROCEDURE — 250N000009 HC RX 250: Performed by: PHYSICIAN ASSISTANT

## 2021-08-15 PROCEDURE — 99232 SBSQ HOSP IP/OBS MODERATE 35: CPT | Performed by: INTERNAL MEDICINE

## 2021-08-15 PROCEDURE — 36415 COLL VENOUS BLD VENIPUNCTURE: CPT | Performed by: INTERNAL MEDICINE

## 2021-08-15 PROCEDURE — 82374 ASSAY BLOOD CARBON DIOXIDE: CPT | Performed by: INTERNAL MEDICINE

## 2021-08-15 PROCEDURE — 120N000001 HC R&B MED SURG/OB

## 2021-08-15 RX ORDER — AMLODIPINE BESYLATE 5 MG/1
5 TABLET ORAL 2 TIMES DAILY
Status: DISCONTINUED | OUTPATIENT
Start: 2021-08-15 | End: 2021-08-16 | Stop reason: HOSPADM

## 2021-08-15 RX ORDER — BISACODYL 10 MG
10 SUPPOSITORY, RECTAL RECTAL DAILY PRN
Status: DISCONTINUED | OUTPATIENT
Start: 2021-08-15 | End: 2021-08-16 | Stop reason: HOSPADM

## 2021-08-15 RX ORDER — AMLODIPINE BESYLATE 5 MG/1
5 TABLET ORAL DAILY
Status: DISCONTINUED | OUTPATIENT
Start: 2021-08-15 | End: 2021-08-15

## 2021-08-15 RX ADMIN — ACETAMINOPHEN 650 MG: 325 TABLET, FILM COATED ORAL at 18:40

## 2021-08-15 RX ADMIN — POLYETHYLENE GLYCOL 3350 17 G: 17 POWDER, FOR SOLUTION ORAL at 15:25

## 2021-08-15 RX ADMIN — PIPERACILLIN AND TAZOBACTAM 3.38 G: 3; .375 INJECTION, POWDER, FOR SOLUTION INTRAVENOUS at 18:41

## 2021-08-15 RX ADMIN — ACETAMINOPHEN 650 MG: 325 TABLET, FILM COATED ORAL at 13:58

## 2021-08-15 RX ADMIN — CYCLOBENZAPRINE 10 MG: 10 TABLET, FILM COATED ORAL at 08:29

## 2021-08-15 RX ADMIN — LISINOPRIL 20 MG: 10 TABLET ORAL at 08:29

## 2021-08-15 RX ADMIN — PIPERACILLIN AND TAZOBACTAM 3.38 G: 3; .375 INJECTION, POWDER, FOR SOLUTION INTRAVENOUS at 11:32

## 2021-08-15 RX ADMIN — FLUTICASONE FUROATE AND VILANTEROL TRIFENATATE 1 PUFF: 200; 25 POWDER RESPIRATORY (INHALATION) at 08:30

## 2021-08-15 RX ADMIN — CYCLOBENZAPRINE 10 MG: 10 TABLET, FILM COATED ORAL at 22:04

## 2021-08-15 RX ADMIN — NICOTINE 1 PATCH: 14 PATCH, EXTENDED RELEASE TRANSDERMAL at 08:30

## 2021-08-15 RX ADMIN — BISACODYL 10 MG: 10 SUPPOSITORY RECTAL at 16:56

## 2021-08-15 RX ADMIN — HYDRALAZINE HYDROCHLORIDE 5 MG: 20 INJECTION INTRAMUSCULAR; INTRAVENOUS at 14:02

## 2021-08-15 RX ADMIN — CYCLOBENZAPRINE 10 MG: 10 TABLET, FILM COATED ORAL at 15:25

## 2021-08-15 RX ADMIN — CARVEDILOL 6.25 MG: 6.25 TABLET, FILM COATED ORAL at 08:29

## 2021-08-15 RX ADMIN — PIPERACILLIN AND TAZOBACTAM 3.38 G: 3; .375 INJECTION, POWDER, FOR SOLUTION INTRAVENOUS at 05:10

## 2021-08-15 RX ADMIN — ASPIRIN 81 MG CHEWABLE TABLET 81 MG: 81 TABLET CHEWABLE at 08:29

## 2021-08-15 RX ADMIN — ACETAMINOPHEN 650 MG: 325 TABLET, FILM COATED ORAL at 22:04

## 2021-08-15 RX ADMIN — ROSUVASTATIN CALCIUM 40 MG: 20 TABLET, FILM COATED ORAL at 22:04

## 2021-08-15 RX ADMIN — FAMOTIDINE 20 MG: 10 INJECTION, SOLUTION INTRAVENOUS at 05:10

## 2021-08-15 RX ADMIN — AMLODIPINE BESYLATE 5 MG: 5 TABLET ORAL at 20:07

## 2021-08-15 RX ADMIN — LISINOPRIL 20 MG: 10 TABLET ORAL at 20:06

## 2021-08-15 RX ADMIN — OXYCODONE HYDROCHLORIDE 10 MG: 5 TABLET ORAL at 23:36

## 2021-08-15 RX ADMIN — CARVEDILOL 6.25 MG: 6.25 TABLET, FILM COATED ORAL at 20:07

## 2021-08-15 RX ADMIN — ACETAMINOPHEN 650 MG: 325 TABLET, FILM COATED ORAL at 08:29

## 2021-08-15 RX ADMIN — AMLODIPINE BESYLATE 5 MG: 5 TABLET ORAL at 11:32

## 2021-08-15 RX ADMIN — HYDRALAZINE HYDROCHLORIDE 5 MG: 20 INJECTION INTRAMUSCULAR; INTRAVENOUS at 22:20

## 2021-08-15 RX ADMIN — FAMOTIDINE 20 MG: 10 INJECTION, SOLUTION INTRAVENOUS at 16:56

## 2021-08-15 ASSESSMENT — ACTIVITIES OF DAILY LIVING (ADL)
ADLS_ACUITY_SCORE: 14

## 2021-08-15 ASSESSMENT — MIFFLIN-ST. JEOR: SCORE: 1052.25

## 2021-08-15 NOTE — PROGRESS NOTES
[facility]    Medicine Progress Note - Hospitalist Service        Date of Admission:  8/10/2021  8:18 AM    Assessment & Plan:   Shirley Hendricks is a 60 year old female with medical history significant for hypertension, hyperlipidemia, COPD, CAD, SVT asymptomatic, PAD status post recent surgical revascularization of the right lower extremity in July 2021 for critical ischemia in extremity with history of revascularization, cholecystectomy, salpingectomy admitted on 8/10/2021 with abdominal pain and small bowel obstruction.    Closed loop small bowel obstruction s/p initial laparoscopy converted to open laparotomy with extensive lysis of adhesions  -Abdominal CT scan showed multiple dilated fluid-filled loops of mid small bowel in the lower abdomen have an appearance suspicious for a closed loop small bowel obstruction, an enlarged right common iliac chain lymph node and multiple enlarged bilateral inguinal lymph nodes have increased in size, and are indeterminate.    -General surgery following. She is S/p exploratory laparoscopy with laparoscopic lysis of adhesions converted to laparotomy with extensive lysis of adhesions (8/11).  -diet advancement per general surgery.  Currently on low fiber diet.  -resume plavix today if OK with gen surgery  -Continues on aspirin 81 mg daily     New Hypoglycemia 8/13:  -Blood sugar in the 40s in the morning of 8/13.  Apparently asymptomatic at that time.  -off IVFs  -Blood sugars have been stable now  -Continue to monitor hypoglycemia  -Hypoglycemia protocol in place    Post-op Hypoxia most likely due to atelectasis  Right pleural effusion  -Patient had issues with hypoxia post surgery, RRT was called on 8/12.  Patient hypoxic to the 80s  -Chest Xray revealed new small right pleural effusion and right basilar atelectasis/infiltrate.  No pneumothorax.    -VQ scan low probability for PE  -Most likely cause for atelectasis is postop atelectasis  -Hypoxia  resolved  -Continue aggressive incentive spirometry.       Post-op anemia:  -Pre-op HGB was 12   -Postop hemoglobin has been between 8-9.5, likely multifactorial with surgical blood loss as well as dilutional effect from IV fluids.    -Monitor, no overt bleeding noted currently.      Right medial thigh wound  Failed to heal surgical wound from right lower extremity revascularization.    *Notably seen at [facility] by Dr. Lozano 8/3/21 when she presented due to partial dehiscence and fluctuant area on the left shoulder surgical site with fluctuant area.    -At that visit; thigh wound with noted dehiscence and some serous drainage but no purulence and was sutured.   -WOCN consult appreciated  -Currently on Zosyn(probably for multiple reasons: including leukocytosis/atelectasis/infiltrate/wound)  -Per vascular surgery no concerns for active infection at this time.  Discontinue  Zosyn after today's dose.    PAD   Status post above-the-knee popliteal to below the knee popliteal artery bypass using left upper extremity translocated cephalic vein for critical limb ischemia of extremity with history of revascularization on 7/6/2021 with [facility]. She has been on dual antiplatelet therapy since then. I refer the interested reader to [facility] consultation note from 7/7/2021 for additional details of her PAD.  -Initially planned to resume PTA ASA and Plavix.   -Colleague earlier in stay discussed with general surgery (8/12).  Resumed on ASA and started Lovenox.  -Hemoglobin is stable at the moment, resume Plavix as mentioned above.    Severe malnutrition  Unintentional 26 pound weight loss in the last 8 months and following recent vascular surgery and poor wound healing of the right lower extremity  -Appreciate dietitian consult.       Hypertension  BP in the urgency ranges 220/98 at home, PTA regimen includes lisinopril 20 mg BID and Coreg 6.25 mg BID.  Patient was also on amlodipine 5mg BID from  4/8/21-7/7/21, seems to have been stopped sometime in between.  -Continue prior to admission lisinopril and Coreg             -Blood pressure continues to be persistently high, will add amlodipine 5 mg p.o. daily this morning and adjust as clinically indicated.     Hyperlipidemia  -Continue PTA Crestor.     CAD diffuse, moderate, nonobstructive   by 2009 coronary angiogram.  -Antihypertensive and lipid lower agents as above.    -Dual antiplatelet therapy as above.     Mild hyponatremia  -Resolved     Likely COPD   Severity unknown, last seen at [facility] for [facility] science in 2014 for lung cancer screening but not felt to actually have COPD at that time. Although she has continued to smoke since then. PFTs prior to that were inconclusive and she has had known mediastinal and axillary lymphadenopathy.  -Continue PTA inhalers.     Tobacco use   Attempts to quit have thus far been unsuccessful but she is ready to try again.   -Nicotine replacement therapy.     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  No interventions.       Pulmonary nodule, incidental  Indeterminate 0.4 cm right lower lobe pulmonary nodule is new  since the previous exam.   -Patient will need to be reminded at discharge regarding this finding and follow-up.    -Outpatient follow-up in 12 months to reevaluate pulmonary nodule with [facility] pulmonology     Covid-19 screening  PCR is negative on 8/10/2021, Vaccinated.     History of SVT asymptomatic  Seen on zio patch as work up for lightheadedness  -Low threshold to start telemetry if any symptoms      Diet: Diet  Advance Diet as Tolerated: Low Fiber     DVT Prophylaxis: Enoxaparin (Lovenox) SQ   Alvarez Catheter: Not present  Code Status: Full Code     Disposition Plan    Expected discharge: Likely 8/16, home with home PT  Entered: Vadim Ramirez MD 08/15/2021, 9:41 AM        The patient's care was discussed with the bedside nurse and patient.      Vadim  "MD James  Hospitalist Service  [facility]  Text Page 7AM-6PM  Securely message with the [employer] Web Console (learn more here)  Text page via [employer] Paging/Directory    ______________________________________________________________________    Interval History   Tolerating current diet well so far.  Passing flatus.  No bowel movements yet.  Pain reasonably well controlled.  Hypoglycemia stable.  Blood pressure on the higher side.    Data reviewed today: I reviewed all medications, new labs and imaging results over the last 24 hours. I personally reviewed no images or EKG's today.    Physical Exam   Vital signs:  Temp: 97.7  F (36.5  C) Temp src: Oral BP: (!) 196/91 Pulse: 59   Resp: 16 SpO2: 94 % O2 Device: None (Room air) Oxygen Delivery: 1 LPM Height: 157.5 cm (5' 2\") Weight: 53.9 kg (118 lb 13.3 oz)  Estimated body mass index is 21.73 kg/m  as calculated from the following:    Height as of this encounter: 1.575 m (5' 2\").    Weight as of this encounter: 53.9 kg (118 lb 13.3 oz).      Wt Readings from Last 2 Encounters:   08/15/21 53.9 kg (118 lb 13.3 oz)   07/07/21 48.1 kg (106 lb)       Gen: AAOX3, NAD, comfortable  HEENT: no pallor  Resp: Few crackles at the bases bilaterally, normal effort of breathing  CVS: RRR, no murmur  Abd/GI: Surgical incision in the central abdomen, without any drainage or discharge.  Bowel sounds normoactive.  Skin: Warm, dry no rashes  MSK: Right thigh wound bandaged, no obvious drainage.  Neuro- CN- intact. No focal deficits.        Data   Recent Labs   Lab 08/15/21  0715 08/15/21  0558 08/14/21  2110 08/14/21  0844 08/13/21  1700 08/13/21  0744 08/12/21  1515 08/12/21  0643 08/10/21  1452 08/10/21  0833   WBC  --   --   --  6.1  --  6.3  --  11.8*   < > 8.3   HGB 9.4*  --   --  9.5* 8.3* 8.5*  --  10.4*   < > 12.1   MCV  --   --   --  94  --  94  --  91   < > 89     --   --  156  --  134*  --  144*   < > 198     --   --  132*  --  136  --  135 "   < > 131*   POTASSIUM 3.9  --   --  3.9  --  3.8  3.8  --  4.4   < > 4.1   CHLORIDE 108  --   --  108  --  106  --  104   < > 100   CO2 24  --   --  21  --  19*  --  25   < > 26   BUN 8  --   --  10  --  20  --  17   < > 12   CR 0.64  --   --  0.63  --  0.73  0.72  --  0.84  --  0.65   ANIONGAP 3  --   --  3  --  11  --  6   < > 5   SONIA 8.3*  --   --  8.1*  --  8.4*  --  8.4*   < > 9.5   GLC 81 88 87 84  --  45*  46*   < > 105*   < > 94   ALBUMIN  --   --   --   --   --   --   --   --   --  3.6   PROTTOTAL  --   --   --   --   --   --   --   --   --  7.7   BILITOTAL  --   --   --   --   --   --   --   --   --  0.5   ALKPHOS  --   --   --   --   --   --   --   --   --  70   ALT  --   --   --   --   --   --   --   --   --  24   AST  --   --   --   --   --   --   --   --   --  19   LIPASE  --   --   --   --   --   --   --   --   --  132    < > = values in this interval not displayed.       No results found for this or any previous visit (from the past 24 hour(s)).  Medications       acetaminophen  650 mg Oral 4x Daily    Or     acetaminophen  650 mg Rectal 4x Daily     amLODIPine  5 mg Oral Daily     aspirin  81 mg Oral Daily     carvedilol  6.25 mg Oral BID     [Held by provider] clopidogrel  75 mg Oral Daily     cyclobenzaprine  10 mg Oral TID     enoxaparin ANTICOAGULANT  40 mg Subcutaneous Q24H     famotidine  20 mg Intravenous Q12H     fluticasone-vilanterol  1 puff Inhalation Daily     lisinopril  20 mg Oral BID     nicotine  1 patch Transdermal Daily     nicotine   Transdermal Q8H     piperacillin-tazobactam  3.375 g Intravenous Q6H     rosuvastatin  40 mg Oral At Bedtime     sodium chloride (PF)  3 mL Intracatheter Q8H

## 2021-08-15 NOTE — PLAN OF CARE
Pt. Alert and oriented x4. Vital signs stable on RA, 1L NC to sleep. Up SBA. Tolerating low fiber diet. Lung sounds dim and expiratory wheezes. Bowel sounds active, + flatus. No BM yet, miralax and senna given. Adequate urine output. Lap sites CDI. Requested peroxide from pharmacy for pt to do dressing changes, R thigh wound draining and non-approximated. Pain managed with Oxy, Tylenol and Flexeril. Denies nausea.

## 2021-08-15 NOTE — PLAN OF CARE
1900h-0700h: AOx4. O2Sat 98% on RA. Diminished breath sound on lower lobes. Tolerating clear liquid diet, denies n/v. Voiding adequately, hypoactive BS x4. Ambulated to BR w/ SBA. Midline incision on abdomen & x3 lap sites w/ liquid bandage & RIN. Pedal pulses palpable, w/ baseline numbness & tingling. D5 NS @ 50ml/hr infusing on L hand. Abdominal pain of 5/10 managed w/ scheduled tylenol & was effective.     2306h: /80mmHg, denies symptoms.  0516h: /76 mmHg, denies symptoms.

## 2021-08-15 NOTE — PROVIDER NOTIFICATION
MD Notification    Notified Person: MD    Notified Person Name: James    Notification Date/Time: 8/15 1428    Notification Interaction: AMCOM    Purpose of Notification: BP readings elevated consistently through day, recently 183/102 and 180/99, PRN hydralazine given with little effect 178/88. Pt reports dizziness    Orders Received:    Comments:

## 2021-08-15 NOTE — PROGRESS NOTES
Vascular Surgery Progress Note    S: Continues to improve.  Tolerating diet.      Minimal abdominal pain.    O:   Vitals:  BP  Min: 148/99  Max: 196/91  Temp  Av.7  F (36.5  C)  Min: 97.5  F (36.4  C)  Max: 98.1  F (36.7  C)  Pulse  Av.5  Min: 57  Max: 151  I/O last 3 completed shifts:  In: 120 [P.O.:120]  Out: 4150 [Urine:4150]    Physical Exam: Alert and appropriate.  Comfortable.    Abdomen= soft, nontender    Wds=A                         Right thigh surgical site with some discharge but no erythema.  Cleaned with peroxide.     +3 DP pulses bilaterally.      Assessment/Plan: #1.  Continues to improve following exploratory laparotomy for bowel obstruction.  Advancing diet.  Discontinue IV fluids.     #2.  Right revision bypass graft working well with excellent distal pulses.  Had some issues with her distal thigh incision which is now less than a centimeter deep.  Continue with peroxide daily.  Advised patient.  No need for antibiotics.    Suspect patient will be ready for discharge tomorrow.      Wm. Blake MD

## 2021-08-15 NOTE — PROGRESS NOTES
Surgery    Resting in bed. Pain controlled. Passing flatus. No BM. Russel low residue diet without nausea but does have a decreased appetite. No fevers or chills.    Abd- Less distended. Minimal tenderness. Incision Clean/Dry/Intact.    A/P  Improving. Give suppository today. Continue diet. Ambulate. Home in 1-2 days.    Solo Santos M.D.  [facility]  [phone]

## 2021-08-16 ENCOUNTER — APPOINTMENT (OUTPATIENT)
Dept: PHYSICAL THERAPY | Facility: CLINIC | Age: 61
DRG: 335 | End: 2021-08-16
Payer: COMMERCIAL

## 2021-08-16 VITALS
BODY MASS INDEX: 21.66 KG/M2 | OXYGEN SATURATION: 90 % | WEIGHT: 117.73 LBS | TEMPERATURE: 98 F | RESPIRATION RATE: 16 BRPM | DIASTOLIC BLOOD PRESSURE: 96 MMHG | HEART RATE: 73 BPM | SYSTOLIC BLOOD PRESSURE: 143 MMHG | HEIGHT: 62 IN

## 2021-08-16 LAB
CREAT SERPL-MCNC: 0.62 MG/DL (ref 0.52–1.04)
GFR SERPL CREATININE-BSD FRML MDRD: >90 ML/MIN/1.73M2
GLUCOSE BLDC GLUCOMTR-MCNC: 132 MG/DL (ref 70–99)
GLUCOSE BLDC GLUCOMTR-MCNC: 82 MG/DL (ref 70–99)
PLATELET # BLD AUTO: 231 10E3/UL (ref 150–450)

## 2021-08-16 PROCEDURE — 85049 AUTOMATED PLATELET COUNT: CPT | Performed by: PHYSICIAN ASSISTANT

## 2021-08-16 PROCEDURE — 250N000013 HC RX MED GY IP 250 OP 250 PS 637: Performed by: PHYSICIAN ASSISTANT

## 2021-08-16 PROCEDURE — 97530 THERAPEUTIC ACTIVITIES: CPT | Mod: GP

## 2021-08-16 PROCEDURE — 36415 COLL VENOUS BLD VENIPUNCTURE: CPT | Performed by: INTERNAL MEDICINE

## 2021-08-16 PROCEDURE — 250N000009 HC RX 250: Performed by: PHYSICIAN ASSISTANT

## 2021-08-16 PROCEDURE — 250N000013 HC RX MED GY IP 250 OP 250 PS 637: Performed by: INTERNAL MEDICINE

## 2021-08-16 PROCEDURE — 99239 HOSP IP/OBS DSCHRG MGMT >30: CPT | Performed by: INTERNAL MEDICINE

## 2021-08-16 PROCEDURE — 97116 GAIT TRAINING THERAPY: CPT | Mod: GP

## 2021-08-16 PROCEDURE — 82565 ASSAY OF CREATININE: CPT | Performed by: INTERNAL MEDICINE

## 2021-08-16 RX ORDER — OXYCODONE HYDROCHLORIDE 5 MG/1
5-10 TABLET ORAL EVERY 4 HOURS PRN
Qty: 12 TABLET | Refills: 0 | Status: SHIPPED | OUTPATIENT
Start: 2021-08-16 | End: 2021-08-19

## 2021-08-16 RX ORDER — AMLODIPINE BESYLATE 5 MG/1
5 TABLET ORAL 2 TIMES DAILY
Qty: 60 TABLET | Refills: 1 | Status: SHIPPED | OUTPATIENT
Start: 2021-08-16 | End: 2022-09-07

## 2021-08-16 RX ORDER — CYCLOBENZAPRINE HCL 10 MG
10 TABLET ORAL 3 TIMES DAILY
Qty: 21 TABLET | Refills: 0 | Status: SHIPPED | OUTPATIENT
Start: 2021-08-16 | End: 2021-08-23

## 2021-08-16 RX ORDER — SENNOSIDES 8.6 MG
1 TABLET ORAL 2 TIMES DAILY PRN
Qty: 30 TABLET | Refills: 0 | Status: SHIPPED | OUTPATIENT
Start: 2021-08-16 | End: 2022-10-13

## 2021-08-16 RX ADMIN — CARVEDILOL 6.25 MG: 6.25 TABLET, FILM COATED ORAL at 08:41

## 2021-08-16 RX ADMIN — FLUTICASONE FUROATE AND VILANTEROL TRIFENATATE 1 PUFF: 200; 25 POWDER RESPIRATORY (INHALATION) at 08:41

## 2021-08-16 RX ADMIN — LISINOPRIL 20 MG: 10 TABLET ORAL at 08:41

## 2021-08-16 RX ADMIN — AMLODIPINE BESYLATE 5 MG: 5 TABLET ORAL at 08:41

## 2021-08-16 RX ADMIN — ACETAMINOPHEN 650 MG: 325 TABLET, FILM COATED ORAL at 08:41

## 2021-08-16 RX ADMIN — CYCLOBENZAPRINE 10 MG: 10 TABLET, FILM COATED ORAL at 08:41

## 2021-08-16 RX ADMIN — FAMOTIDINE 20 MG: 10 INJECTION, SOLUTION INTRAVENOUS at 05:14

## 2021-08-16 RX ADMIN — NICOTINE 1 PATCH: 14 PATCH, EXTENDED RELEASE TRANSDERMAL at 08:42

## 2021-08-16 RX ADMIN — ASPIRIN 81 MG CHEWABLE TABLET 81 MG: 81 TABLET CHEWABLE at 08:41

## 2021-08-16 RX ADMIN — CLOPIDOGREL BISULFATE 75 MG: 75 TABLET ORAL at 08:41

## 2021-08-16 ASSESSMENT — ACTIVITIES OF DAILY LIVING (ADL)
WHICH_OF_THE_ABOVE_FUNCTIONAL_RISKS_HAD_A_RECENT_ONSET_OR_CHANGE?: FALL HISTORY
CONCENTRATING,_REMEMBERING_OR_MAKING_DECISIONS_DIFFICULTY: NO
WALKING_OR_CLIMBING_STAIRS_DIFFICULTY: NO
ADLS_ACUITY_SCORE: 14
DOING_ERRANDS_INDEPENDENTLY_DIFFICULTY: NO
DIFFICULTY_COMMUNICATING: NO
FALL_HISTORY_WITHIN_LAST_SIX_MONTHS: NO
DIFFICULTY_EATING/SWALLOWING: NO
FALL_HISTORY_WITHIN_LAST_SIX_MONTHS: YES
NUMBER_OF_TIMES_PATIENT_HAS_FALLEN_WITHIN_LAST_SIX_MONTHS: 2
DRESSING/BATHING_DIFFICULTY: NO
ADLS_ACUITY_SCORE: 14
TOILETING_ISSUES: NO
VISION_MANAGEMENT: READING
ADLS_ACUITY_SCORE: 14
WEAR_GLASSES_OR_BLIND: YES

## 2021-08-16 ASSESSMENT — MIFFLIN-ST. JEOR: SCORE: 1047.25

## 2021-08-16 NOTE — DISCHARGE SUMMARY
[facility]    Discharge Summary  Hospitalist    Date of Admission:  8/10/2021  Date of Discharge:  8/16/2021  Discharging Provider: Vadim Ramirez MD    Discharge Diagnoses      Closed loop small bowel obstruction s/p initial laparoscopy converted to open laparotomy with extensive lysis of adhesions.  Hypoglycemia due to poor oral intake.   Post-op Hypoxia most likely due to atelectasis-resolved  Post-op anemia-multifactorial  Right medial thigh wound  PAD   Severe malnutrition  Hypertension  Hyperlipidemia  CAD diffuse, moderate, nonobstructive   Mild hyponatremia  Likely COPD   Tobacco use   Recent diagnosis of Charcot-Breonna-Tooth disease  Pulmonary nodule, incidental    Hospital Course:    Shirely Hendricks is a 60 year old female with medical history significant for hypertension, hyperlipidemia, COPD, CAD, SVT asymptomatic, PAD status post recent surgical revascularization of the right lower extremity in July 2021 for critical ischemia in extremity with history of revascularization, cholecystectomy, salpingectomy admitted on 8/10/2021 with abdominal pain and small bowel obstruction.     Closed loop small bowel obstruction s/p initial laparoscopy converted to open laparotomy with extensive lysis of adhesions  -Abdominal CT scan showed multiple dilated fluid-filled loops of mid small bowel in the lower abdomen have an appearance suspicious for a closed loop small bowel obstruction, an enlarged right common iliac chain lymph node and multiple enlarged bilateral inguinal lymph nodes have increased in size, and are indeterminate.    -General surgery followed  -underwent exploratory laparoscopy with laparoscopic lysis of adhesions converted to laparotomy with extensive lysis of adhesions (8/11).  -Postop bowel recovery was fairly unremarkable, now diet advanced to low fiber diet tolerating well.  Had a bowel movement.  Pain reasonably well controlled.      New Hypoglycemia 8/13:  -Blood  sugar in the 40s in the morning of 8/13.  Apparently asymptomatic at that time.  -This likely is from malnutrition, poor glycogen reserves and decreased oral intake  -Blood sugars have been stable now     Post-op Hypoxia most likely due to atelectasis  Right pleural effusion  -Patient had issues with hypoxia post surgery, RRT was called on 8/12.  Patient hypoxic to the 80s  -Chest Xray revealed new small right pleural effusion and right basilar atelectasis/infiltrate.  No pneumothorax.    -VQ scan low probability for PE  -Most likely cause for atelectasis is postop atelectasis  -Hypoxia resolved     Post-op anemia:  -Pre-op HGB was 12   -Postop hemoglobin has been between 8-9.5, likely multifactorial with surgical blood loss as well as dilutional effect from IV fluids.    -Monitor, no overt bleeding noted currently.      Right medial thigh wound  Failed to heal surgical wound from right lower extremity revascularization.    *Notably seen [facility] by Dr. Lozano 8/3/21 when she presented due to partial dehiscence and fluctuant area on the left shoulder surgical site with fluctuant area.    -At that visit; thigh wound with noted dehiscence and some serous drainage but no purulence and was sutured.   -WOCN consult appreciated  -Per vascular surgery no concerns for active infection at this time.    Received Zosyn for 5 days then discontinued as there was no ongoing concerns for wound infection.    PAD   Status post above-the-knee popliteal to below the knee popliteal artery bypass using left upper extremity translocated cephalic vein for critical limb ischemia of extremity with history of revascularization on 7/6/2021 with [facility]. She has been on dual antiplatelet therapy since then. I refer the interested reader to [facility] consultation note from 7/7/2021 for additional details of her PAD.  -Initially planned to resume PTA ASA and Plavix.   -Colleague earlier in stay discussed with  general surgery (8/12).  Resumed on ASA.  -Hemoglobin is stable at the moment,  Plavix resumed prior to discharge.     Severe malnutrition  Unintentional 26 pound weight loss in the last 8 months and following recent vascular surgery and poor wound healing of the right lower extremity  -Appreciate dietitian consult.       Hypertension  BP in the urgency ranges 220/98 at home, PTA regimen includes lisinopril 20 mg BID and Coreg 6.25 mg BID.  Patient was also on amlodipine 5mg BID from 4/8/21-7/7/21, seems to have been stopped sometime in between.  -Continue prior to admission lisinopril and Coreg             -Blood pressure was persistently on the higher side.  Amlodipine 5 mg p.o. twice daily added to her regimen.  Hypertensive response could be due to recent surgery/pain.  Her blood pressure is fairly acceptable now, would recommend following up closely with PCP to make any ongoing adjustment in her regimen.     Hyperlipidemia  -Continue PTA Crestor.     CAD diffuse, moderate, nonobstructive   by 2009 coronary angiogram.  -Antihypertensive and lipid lower agents as above.    -Dual antiplatelet therapy as above.     Mild hyponatremia  -Resolved     Likely COPD   Severity unknown, last seen at [facility] for lung science in 2014 for lung cancer screening but not felt to actually have COPD at that time. Although she has continued to smoke since then. PFTs prior to that were inconclusive and she has had known mediastinal and axillary lymphadenopathy.  -Continue PTA inhalers.     Tobacco use   Attempts to quit have thus far been unsuccessful but she is ready to try again.   -Nicotine replacement therapy.     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  No interventions.       Pulmonary nodule, incidental  Indeterminate 0.4 cm right lower lobe pulmonary nodule is new  since the previous exam.   -Patient will need to be reminded at discharge regarding this finding and  follow-up.    -Outpatient follow-up in 12 months to reevaluate pulmonary nodule with [facility] pulmonology     Covid-19 screening  PCR is negative on 8/10/2021, Vaccinated.     History of SVT asymptomatic  Seen on zio patch as work up for lightheadedness  -Low threshold to start telemetry if any symptoms     Vadim Ramirez MD    Significant Results and Procedures   See below    Pending Results     Unresulted Labs Ordered in the Past 30 Days of this Admission     No orders found from 7/11/2021 to 8/11/2021.          Code Status   Full Code       Primary Care Physician   Vasquez Benoit    Physical Exam   Temp: 98  F (36.7  C) Temp src: Oral BP: (!) 143/96 Pulse: 73   Resp: 16 SpO2: 90 % O2 Device: None (Room air)      Constitutional: AAOX3, NAD  Respiratory: CTA B/L, Normal WOB  Cardiovascular: RRR, No murmur  GI: Soft, mild appropriate tenderness around surgical site, bowel sounds normoactive.  MSK: no edema  Neuro: CN- grossly intact, Moving all 4 extremities.     Discharge Disposition   Discharged to home  Condition at discharge: Stable    Consultations This Hospital Stay   SURGERY GENERAL IP CONSULT  SMOKING CESSATION PROGRAM IP CONSULT  WOUND OSTOMY CONTINENCE NURSE  IP CONSULT  NUTRITION SERVICES ADULT IP CONSULT  [place] [facility] IP CONSULT  VASCULAR SURGERY IP CONSULT  PHARMACY TO DOSE VANCO  PHYSICAL THERAPY ADULT IP CONSULT    Time Spent on this Encounter   I, Vadim Ramirez MD, personally saw the patient today and spent greater than 30 minutes discharging this patient.    Discharge Orders      Medication Therapy Management Referral      Home Care PT Referral for Hospital Discharge      Follow-up and recommended labs and tests    Follow up with Dr. Ferreira in approximately 10-14 days. Call [phone] to schedule an appointment or if you have any concerns. We are located at [address] [place] [address].     Activity    Please see attached discharge instructions.     Follow-up  and recommended labs and tests    Follow up with primary care provider, Vasquez Benoit, within 7 days for hospital follow- up.     Activity    Your activity upon discharge: activity as tolerated     MD face to face encounter    Documentation of Face to Face and Certification for Home Health Services    I certify that patient: Shirley Hendricks is under my care and that I, or a nurse practitioner or physician's assistant working with me, had a face-to-face encounter that meets the physician face-to-face encounter requirements with this patient on: 8/16/2021.    This encounter with the patient was in whole, or in part, for the following medical condition, which is the primary reason for home health care: Gen deconditioning.    I certify that, based on my findings, the following services are medically necessary home health services: Nursing and Occupational Therapy.    My clinical findings support the need for the above services because: Nurse is needed: For complex aftercare of surgical procedures because the patient needs instruction and cannot perform care on their own.    Further, I certify that my clinical findings support that this patient is homebound (i.e. absences from home require considerable and taxing effort and are for medical reasons or [employer] services or infrequently or of short duration when for other reasons) because: Patient is bedbound due to: current illness..    Based on the above findings. I certify that this patient is confined to the home and needs intermittent skilled nursing care, physical therapy and/or speech therapy.  The patient is under my care, and I have initiated the establishment of the plan of care.  This patient will be followed by a physician who will periodically review the plan of care.  Physician/Provider to provide follow up care: Vasquez Benoit    Attending hospital physician (the Medicare certified [facility] provider): Vadim Ramirez MD  Physician Signature: See electronic  signature associated with these discharge orders.  Date: 8/16/2021     Diet    Please see attached discharge instructions.     Diet    Follow this diet upon discharge: Orders Placed This Encounter      Diet      Advance Diet as Tolerated: Low Fiber     Discharge Medications   Current Discharge Medication List      START taking these medications    Details   amLODIPine (NORVASC) 5 MG tablet Take 1 tablet (5 mg) by mouth 2 times daily  Qty: 60 tablet, Refills: 1    Comments: Future refills by PCP Dr. Vasquez Benoit with phone number [phone].  Associated Diagnoses: Essential hypertension      cyclobenzaprine (FLEXERIL) 10 MG tablet Take 1 tablet (10 mg) by mouth 3 times daily for 7 days  Qty: 21 tablet, Refills: 0    Associated Diagnoses: Postoperative pain      oxyCODONE (ROXICODONE) 5 MG tablet Take 1-2 tablets (5-10 mg) by mouth every 4 hours as needed for moderate to severe pain  Qty: 12 tablet, Refills: 0    Associated Diagnoses: Postoperative pain      sennosides (SENOKOT) 8.6 MG tablet Take 1 tablet by mouth 2 times daily as needed for constipation  Qty: 30 tablet, Refills: 0    Associated Diagnoses: Postoperative pain         CONTINUE these medications which have NOT CHANGED    Details   acetaminophen (TYLENOL) 500 MG tablet Take 500-1,000 mg by mouth every 6 hours as needed for mild pain      Calcium Carbonate-Vitamin D3 (CALCIUM 600-D) 600-400 MG-UNIT TABS Take 1 tablet by mouth every evening      carvedilol (COREG) 6.25 MG tablet Take 6.25 mg by mouth 2 times daily (with meals)      clopidogrel (PLAVIX) 75 MG tablet Take 1 tablet (75 mg) by mouth daily  Qty: 90 tablet, Refills: 3    Associated Diagnoses: Peripheral vascular disease (H)      denosumab (PROLIA) 60 MG/ML SOLN injection Inject 1 mL (60 mg) Subcutaneous every 6 months INDICATION: TO TREAT OSTEOPOROSIS  Qty: 1 Syringe, Refills: 0    Associated Diagnoses: Osteoporosis without current pathological fracture, unspecified osteoporosis type       fluticasone-vilanterol (BREO ELLIPTA) 200-25 MCG/INH inhaler Inhale 1 puff into the lungs daily      lisinopril (ZESTRIL) 20 MG tablet Take 20 mg by mouth 2 times daily       nicotine (NICODERM CQ) 21 MG/24HR 24 hr patch Place 1 patch onto the skin every 24 hours  Qty: 30 patch, Refills: 0    Associated Diagnoses: Tobacco use disorder      nitroGLYcerin (NITROSTAT) 0.4 MG sublingual tablet Place 1 tablet (0.4 mg) under the tongue See Admin Instructions for chest pain  Qty: 30 tablet, Refills: 11    Associated Diagnoses: Coronary artery disease involving native coronary artery of native heart without angina pectoris      omeprazole (PRILOSEC) 20 MG DR capsule TAKE ONE CAPSULE BY MOUTH DAILY  Qty: 90 capsule, Refills: 1    Comments: MD aware of pantoprazole intolerance.  For prescription as prescribed.  Associated Diagnoses: Gastroesophageal reflux disease with esophagitis without hemorrhage      rosuvastatin (CRESTOR) 40 MG tablet Take 1 tablet (40 mg) by mouth At Bedtime  Qty: 90 tablet, Refills: 3    Associated Diagnoses: Hyperlipidemia LDL goal <70      aspirin (ASA) 81 MG chewable tablet Take 1 tablet (81 mg) by mouth daily  Qty: 30 tablet, Refills: 3    Associated Diagnoses: PAD (peripheral artery disease) (H)      nicotine (NICODERM CQ) 14 MG/24HR 24 hr patch Place 1 patch onto the skin every 24 hours  Qty: 30 patch, Refills: 1    Associated Diagnoses: Tobacco use disorder      nicotine (NICODERM CQ) 7 MG/24HR 24 hr patch Place 1 patch onto the skin every 24 hours Start after completing 14 mg patches.  Qty: 30 patch, Refills: 1    Associated Diagnoses: Tobacco use disorder           Allergies   Allergies   Allergen Reactions     Contrast Dye Anaphylaxis     RASH, FACIAL AND NECK SWELLING, SOB, WHEEZING     Pantoprazole      Protonix caused diffuse edema     Chantix [Varenicline]      Terrible dreams     Penicillins Itching     Data   Most Recent 3 CBC's:  Recent Labs   Lab Test 08/16/21  0650 08/15/21  0715  08/14/21  0844 08/13/21  1700 08/13/21  0744 08/12/21  0643   WBC  --   --  6.1  --  6.3 11.8*   HGB  --  9.4* 9.5* 8.3* 8.5* 10.4*   MCV  --   --  94  --  94 91    198 156  --  134* 144*      Most Recent 3 BMP's:  Recent Labs   Lab Test 08/16/21  0650 08/16/21  0522 08/16/21  0011 08/15/21  1804 08/15/21  0715 08/14/21  0844 08/13/21  0744   NA  --   --   --   --  135 132* 136   POTASSIUM  --   --   --   --  3.9 3.9 3.8  3.8   CHLORIDE  --   --   --   --  108 108 106   CO2  --   --   --   --  24 21 19*   BUN  --   --   --   --  8 10 20   CR 0.62  --   --   --  0.64 0.63 0.73  0.72   ANIONGAP  --   --   --   --  3 3 11   SONIA  --   --   --   --  8.3* 8.1* 8.4*   GLC  --  82 132* 96 81 84 45*  46*     Most Recent 2 LFT's:  Recent Labs   Lab Test 08/10/21  0833 07/12/21  1258   AST 19 38   ALT 24 28   ALKPHOS 70 72   BILITOTAL 0.5 0.4     Most Recent INR's and Anticoagulation Dosing History:  Anticoagulation Dose History     Recent Dosing and Labs Latest Ref Rng & Units 8/13/2008 8/6/2009 9/1/2009 3/11/2010 4/21/2016 5/10/2016 9/24/2020    INR 0.86 - 1.14 0.95 0.92 1.02 0.95 1.02 0.95 1.01        Most Recent 3 Troponin's:  Recent Labs   Lab Test 06/10/20  1243 02/16/20  1824 09/18/19  0739   TROPI <0.015 <0.015 <0.015     Most Recent Cholesterol Panel:  Recent Labs   Lab Test 07/07/21  0624   CHOL 143   LDL 76   HDL 53   TRIG 71     Most Recent 6 Bacteria Isolates From Any Culture (See EPIC Reports for Culture Details):No lab results found.  Most Recent TSH, T4 and A1c Labs:  Recent Labs   Lab Test 01/05/21  1119 09/23/20  0844 02/04/19  0934 03/28/17  0922   TSH 0.77  --   --  1.37   T4  --   --   --  1.19   A1C  --  5.4   < >  --     < > = values in this interval not displayed.       Results for orders placed or performed during the hospital encounter of 08/10/21   CT Abdomen Pelvis w/o Contrast    Narrative    CT ABDOMEN AND PELVIS WITHOUT CONTRAST 8/10/2021 9:20 AM    CLINICAL HISTORY: Abdominal  pain.  TECHNIQUE: CT scan of the abdomen and pelvis was performed without IV  contrast. Multiplanar reformats were obtained. Dose reduction  techniques were used.  CONTRAST: None.  COMPARISON: CT of the abdomen and pelvis performed 6/10/2020.    FINDINGS:   LOWER CHEST: Small calcified granuloma in the right lower lobe of the  lung. An indeterminate 0.4 cm pulmonary nodule in the right lower lobe  laterally (series 3 image 11) was not visualized on the previous exam.  The visualized lung bases are otherwise clear. Small hiatal hernia.  Coronary artery calcification.    HEPATOBILIARY: Prior cholecystectomy. No focal hepatic lesions are  seen.    PANCREAS: Normal.    SPLEEN: Normal.    ADRENAL GLANDS: Normal.    KIDNEYS/BLADDER: Mild right hydronephrosis is new since the previous  exam, with no definite cause identified.    BOWEL: There are multiple dilated fluid-filled loops of mid small  bowel in the lower abdomen, with mild associated mesenteric edema,  with an appearance suspicious for a closed loop small bowel  obstruction. A probable transition point is noted in the midabdomen  right of midline (series 4 image 26). No convincing evidence for  colitis or diverticulitis. The appendix is not clearly identified on  this exam, but there is no convincing inflammatory change in the  expected region of the appendix.    PELVIC ORGANS: Unremarkable.    LYMPH NODES: An enlarged right common iliac chain lymph node (series 3  image 110) measures 2.3 x 1.8 cm, and has increased in size. A few  mildly enlarged bilateral inguinal lymph nodes have also increased in  size.    VASCULATURE: Advanced atherosclerotic aortoiliac calcification.  Postoperative changes of aortofemoral bypass graft.    ADDITIONAL FINDINGS: None.    MUSCULOSKELETAL: Unremarkable.      Impression    IMPRESSION:   1. Multiple dilated fluid-filled loops of mid small bowel in the lower  abdomen have an appearance suspicious for a closed loop small  bowel  obstruction.  2. An enlarged right common iliac chain lymph node and multiple  enlarged bilateral inguinal lymph nodes have increased in size, and  are indeterminate.  3. Indeterminate 0.4 cm right lower lobe pulmonary nodule is new since  the previous exam. Please refer to pulmonary nodule follow-up  guidelines below.    Recommendations for one or multiple incidental lung nodules < 6mm :    Low risk patients: No routine follow-up.    High risk patients: Optional follow-up CT at 12 months; if  unchanged, no further follow-up.    *Low Risk: Minimal or absent history of smoking or other known risk  factors.  *Nonsolid (ground glass) or partly solid nodules may require longer  follow-up to exclude indolent adenocarcinoma.  *Recommendations based on Guidelines for the Management of Incidental  Pulmonary Nodules Detected at CT: From the Fleischner Society 2017,  Radiology 2017.  *Guidelines apply to incidental nodules in patients who are 35 years  or older.  *Guidelines do not apply to lung cancer screening, patients with  immunosuppression, or patients with known primary cancer.    MOISÉS TRIVEDI MD         SYSTEM ID:  [id number]   XR Abdomen 1 View    Narrative    ABDOMEN ONE VIEW  August 11, 2021 7:12 AM     HISTORY: Check NG tube position.    COMPARISON: None.       Impression    IMPRESSION: NG tube in the region of the gastroesophageal junction,  consider advancement. Dilated small bowel noted.    CHANI CANALES MD         SYSTEM ID:  [id number]   XR Abdomen Port 1 View    Narrative    ABDOMEN ONE VIEW PORTABLE  8/11/2021 3:34 PM     HISTORY: NG tube location verification.    COMPARISON: 8/11/21 at 7:08 AM.       Impression    IMPRESSION: NG tube tip only minimally advanced into the left upper  quadrant.    CHANI CANALES MD         SYSTEM ID:  [id number]   XR Chest Port 1 View    Narrative    XR CHEST PORT 1 VIEW 8/12/2021 1:01 PM    HISTORY: increasing hypoxia    COMPARISON: 1/5/2021 and CT of the abdomen  8/10/2021      Impression    IMPRESSION: New small right pleural effusion and right basilar  atelectasis/infiltrate. No pneumothorax. The left lung is clear.  Normal heart size. Tortuous aorta with atherosclerotic calcifications.  Cholecystectomy clips.    BRANDON ALBERTS MD         SYSTEM ID:  [id number]   XR Chest 2 Views    Narrative    XR CHEST 2 VW  8/13/2021 11:44 AM       INDICATION: PE suspected, low/intermediate prob, positive D-dimer;  hypoxia  COMPARISON: 8/12/2021       Impression    IMPRESSION: Small bilateral pleural effusions with adjacent  atelectasis in each lung base. Lungs are otherwise clear. Heart size  normal.    RAGINI DEVINE MD         SYSTEM ID:  [id number]   NM Lung Scan Perfusion Particulate    Narrative    EXAM: NM LUNG SCAN PERFUSION PARTICULATE  LOCATION: [facility]  DATE/TIME: 8/13/2021 1:16 PM    INDICATION: PE suspected, low/intermediate prob, positive D-dimer  COMPARISON: CXR 08/13/2021  TECHNIQUE: 3.2 mCi technetium-99m MAA, IV. Standard lung perfusion imaging.    FINDINGS: Mild nonsegmental areas of photopenia in the lower lungs from underlying pleural effusions and atelectasis depicted radiographically. Normal perfusion to both lungs elsewhere. No segmental perfusion defects.      Impression    IMPRESSION:   Negative for pulmonary embolism.

## 2021-08-16 NOTE — PROGRESS NOTES
VASCULAR SURGERY    Patient doing very well today.  Tolerating diet.  Anxious to go home after being seen by general surgery.    Right thigh wound is healing well and he will continue cleaning with hydrogen peroxide and Q-tip to heal by secondary intent.  No need for antibiotics.  + DP pulses bilaterally.    She will follow up with me in the office in approximately 2 weeks.    Back on aspirin/Plavix.       Chadwick Lozano MD

## 2021-08-16 NOTE — PROGRESS NOTES
[facility] [place]    General Surgery  Daily Progress Note       Assessment and Plan:   Shirley Hendricks is a 60 year old female admitted with closed loop small bowel obstruction secondary to omental adhesions, now 5 days s/p exploratory laparoscopy with laparoscopic lysis of adhesions converted to laparotomy with extensive lysis of adhesions     PLAN:  - Tolerating low fiber diet, flatus+/BM+  - Pain controlled with oxycodone, flexeril, and tylenol  - Back on plavix and aspirin  - Encourage ambulate QID, deep breathe, and IS    DISPOSITION:  - Okay to discharge today from General Surgery standpoint, final discharge order per primary team  - Discharge instructions reviewed, discharged with oxycodone, flexeril, and senokot  - Follow up Dr. Ferreira in approximately 10-14 days  - Abide by low fiber diet until follow up appointment        Interval History:   Shirley Hendricks is seen this morning on surgical rounds. She is tolerating low fiber diet. She had medium loose BM following suppository yesterday. Abodminal pain is controlled with tylenol and oxycodone. She's been having high blood pressures, vitals otherwise wnl.         Physical Exam:   Temp: 98  F (36.7  C) Temp src: Oral BP: (!) 143/96 Pulse: 73   Resp: 16 SpO2: 90 % O2 Device: None (Room air)      I/O last 3 completed shifts:  In: 483 [P.O.:480; I.V.:3]  Out: 1750 [Urine:1750]    GENERAL: VS reviewed, alert, oriented, no acute distress  LUNGS: Normal respiratory effort, no wheezing  ABDOMEN:  Soft, nontender, non-distended  INCISION: Clean, dry, and intact. No surrounding erythema.  EXTREMITIES: Moving all extremities  NEUROLOGICAL: Grossly non-focal, mood & affect appropriate    Data   Recent Labs   Lab 08/16/21  0650 08/16/21  0522 08/16/21  0011 08/15/21  1804 08/15/21  0715 08/14/21  0844 08/13/21  1700 08/13/21  0744 08/12/21  1515 08/12/21  0643 08/10/21  1452 08/10/21  0833   WBC  --   --   --   --   --  6.1  --  6.3  --  11.8*    < > 8.3   HGB  --   --   --   --  9.4* 9.5* 8.3* 8.5*  --  10.4*   < > 12.1   MCV  --   --   --   --   --  94  --  94  --  91   < > 89     --   --   --  198 156  --  134*  --  144*   < > 198   NA  --   --   --   --  135 132*  --  136  --  135   < > 131*   POTASSIUM  --   --   --   --  3.9 3.9  --  3.8  3.8  --  4.4   < > 4.1   CHLORIDE  --   --   --   --  108 108  --  106  --  104   < > 100   CO2  --   --   --   --  24 21  --  19*  --  25   < > 26   BUN  --   --   --   --  8 10  --  20  --  17   < > 12   CR 0.62  --   --   --  0.64 0.63  --  0.73  0.72  --  0.84  --  0.65   ANIONGAP  --   --   --   --  3 3  --  11  --  6   < > 5   SONIA  --   --   --   --  8.3* 8.1*  --  8.4*  --  8.4*   < > 9.5   GLC  --  82 132* 96 81 84  --  45*  46*   < > 105*   < > 94   ALBUMIN  --   --   --   --   --   --   --   --   --   --   --  3.6   PROTTOTAL  --   --   --   --   --   --   --   --   --   --   --  7.7   BILITOTAL  --   --   --   --   --   --   --   --   --   --   --  0.5   ALKPHOS  --   --   --   --   --   --   --   --   --   --   --  70   ALT  --   --   --   --   --   --   --   --   --   --   --  24   AST  --   --   --   --   --   --   --   --   --   --   --  19    < > = values in this interval not displayed.       Krissy Flower PA-C

## 2021-08-16 NOTE — PROGRESS NOTES
Discharge orders included order for Home PT. Met with patient to discuss. Patient declines need for home PT and has plans for her continue outpatient appointments and program she has created. Notified [facility] to disregard orders.

## 2021-08-16 NOTE — PLAN OF CARE
Pt. Alert and oriented x4. Vital signs stable on RA, hypertension has improved since yesterday- still slightly elevated SBP 140s, DBPs in 90s. Up independently in room, SBA in halls. Tolerating low fiber diet. Lung sounds dim. Bowel sounds active, + flatus. BM yesterday, adequate urine output. Midline incision and lap sites CDI. R thigh wound cares done prior to discharge. Pain managed with pepe Tylenol. Denies nausea. Pt discharging home today. Educated pt on removing nicotine patch when she goes home, if she plans to continue smoking- spoke with pt about cessation and pt seems very hopeful she can. Reviewed meds and instructions prior to discharge.

## 2021-08-16 NOTE — PLAN OF CARE
Pt. Alert and oriented x4. Vital signs stable on RA except HTN, providers aware and med adjustments made. Up ind. Tolerating low fiber diet. Lung sounds clear. Bowel sounds active, + flatus. Medium loose BM today after suppository and miralax, adequate urine output. Lap sites CDI. Pain managed with pepe Tylenol. Denies nausea.

## 2021-08-16 NOTE — PLAN OF CARE
1900h-0700h: AOx4. O2Sat 97% on RA. Diminished breath sound on lower lobes. Tolerating clear liquid diet, denies n/v. Voiding adequately, normoactive BS x4. Ambulated in the hallway using FWW w/ SBA. Midline incision on abdomen & x3 lap sites w/ liquid bandage & RIN. Wound dressing on inner aspect of R thigh, CDI. Pedal pulses palpable, w/ baseline numbness & tingling. IV SL on L forearm. Abdominal pain of 5/10 managed w/ scheduled tylenol & 1x oxycodone; was effective.     2205 h: /92 mmHg, denies symptoms. Hydralazine 5mg PRN administered.  2336 h: /97 mmHg.

## 2021-08-16 NOTE — PLAN OF CARE
Physical Therapy Discharge Summary    Reason for therapy discharge:    Discharged to home with recommendations for HHPT. However, pt declines HHPT.    Progress towards therapy goal(s). See goals on Care Plan in [facility] electronic health record for goal details.  Goals partially met.  Barriers to achieving goals:   discharge from facility.    Therapy recommendation(s):    Continued therapy is recommended.  Rationale/Recommendations:  To further increase independence with mobility.

## 2021-08-17 ENCOUNTER — PATIENT OUTREACH (OUTPATIENT)
Dept: CARE COORDINATION | Facility: CLINIC | Age: 61
End: 2021-08-17

## 2021-08-17 DIAGNOSIS — Z71.89 OTHER SPECIFIED COUNSELING: ICD-10-CM

## 2021-08-17 NOTE — PROGRESS NOTES
Clinic Care Coordination Contact  [facility]: Post-Discharge Note  SITUATION                                                      Admission:    Admission Date: 08/10/21   Reason for Admission: Closed loop small bowel obstruction s/p initial laparoscopy converted to open laparotomy with extensive lysis of adhesions.  Discharge:   Discharge Date: 08/16/21  Discharge Diagnosis: Closed loop small bowel obstruction s/p initial laparoscopy converted to open laparotomy with extensive lysis of adhesions.    BACKGROUND                                                      hSirley Hendricks is a 60 year old female with past medical history of  hypertension, hyperlipidemia, possible COPD severity unspecified, CAD diffuse, moderate, nonobstructive, SVT, PAD status post recent above-the-knee popliteal to below the knee popliteal artery bypass using left upper extremity translocated cephalic vein for critical limb ischemia of extremity with history of revascularization on 7/6/2021lower extremity bypass in July 2021, cholecystectomy, salpingectomy who had sudden onset of abdominal pain at 6 AM on the morning of 8/10/2021 which woke her from sleep.  She describes as 10/10 in severity no prior similar episodes.  It was sharp shooting stabbing aching she did not try any relieving factors at home.  She was occasionally helped by position changes.  She called clinic provider who referred her to the emergency department.  She describes the pain as initially being supraumbilical and then radiating to bilateral sides.  She vomited once on arrival to the emergency department, does not know if it is bloody or not.  Has been nauseated at home prior to coming to the ED.  She had a BM on the a.m. of 8/9/2021 passed flatus on the a.m. of admission.  As she thinks that her last meal at [employer] on the evening of 8/9/2021 may be contributing.  She endorses chills but denies any chest pain shortness of breath cough.  She endorses 26  pound unintentional weight loss since January of this year.  There is no dysuria.  She denies any syncopal episodes but reports she does get lightheaded.  She checks her blood pressure every morning at home before taking her antihypertensives.  At times she will be as high as 220/98 before her meds.  She will hold them if she is having systolic pressures in the 100 range.  She has not had any in hypertensives this morning.  Although she is quite uncomfortable and chronically ill-appearing she also seems a bit overwhelmed by my questions regarding CODE STATUS and is slightly tearful throughout the interview.    ASSESSMENT      Discharge Assessment  How are you doing now that you are home?: moving around a lot  How are your symptoms? (Red Flag symptoms escalate to triage hotline per guidelines): Unchanged  Do you feel your condition is stable enough to be safe at home until your provider visit?: Yes  Does the patient have their discharge instructions? : Yes  Does the patient have questions regarding their discharge instructions? : No  Were you started on any new medications or were there changes to any of your previous medications? : Yes - Schedule [facility] appt within 48 business hours  Does the patient have all of their medications?: Yes  Do you have questions regarding any of your medications? : No  Do you have all of your needed medical supplies or equipment (DME)?  (i.e. oxygen tank, CPAP, cane, etc.): Yes  Discharge follow-up appointment scheduled within 14 calendar days? : Yes  Discharge Follow Up Appointment Date: 08/26/21  Discharge Follow Up Appointment Scheduled with?: Primary Care Provider    Post-op  Did the patient have surgery or a procedure: Yes  Incision: healing  Drainage: No  Bleeding: none  Fever: No  Chills: No  Redness: No  Swelling: No  Incision site pain: No  Closure: dissolving  Eating & Drinking: eating and drinking without complaints/concerns (not much of an appetite)  PO Intake: low  fiber  Bowel Function: loose stools  Date of last BM: 08/17/21  Urinary Status: voiding without complaint/concerns        PLAN                                                      Outpatient Plan:  Follow up with Dr. Ferreira in approximately 10-14 days    Future Appointments   Date Time Provider Department Center   8/26/2021  1:45 PM Chadwick Lozano [facility]   10/28/2021  8:00 AM [email or web address]   10/28/2021  8:45 AM [facility]   10/28/2021  9:30 AM [facility]   10/28/2021 10:30 AM Chadwick Lozano [facility]         For any urgent concerns, please contact our 24 hour nurse triage line: [phone] ([phone])       DEWAYNE Acosta  [phone]  [facility]

## 2021-08-18 ENCOUNTER — TELEPHONE (OUTPATIENT)
Dept: INTERNAL MEDICINE | Facility: CLINIC | Age: 61
End: 2021-08-18

## 2021-08-18 NOTE — TELEPHONE ENCOUNTER
MTM referral from: Transitions of Care (recent hospital discharge or ED visit)    MTM referral outreach attempt #2 on August 18, 2021 at 4:28 PM      Outcome: Patient not reachable after several attempts, will route to MTM Pharmacist/Provider as an FYI. Thank you for the referral.    Tone Horn, MTM coordinator

## 2021-08-19 NOTE — TELEPHONE ENCOUNTER
Sent Tato cuadrag.    Niki Madison, PharmD, [facility]  Medication Therapy Management Provider  Pager: [phone]

## 2021-08-24 NOTE — PROGRESS NOTES
[facility]    Shirley Hendricks returns for suture removal of her right distal thigh incision.  History of significant PAD with remote aortobifemoral bypass graft and left femoral-popliteal in situ bypass graft.  Did require cadaveric SFA bypass from the right common femoral artery to above-knee popliteal artery.  Developed occlusion of the mid medial popliteal artery treated initially with angioplasty but recurrent stenosis.  A outflow revision from the distal cadaveric SFA graft to the below-knee popliteal artery with cephalic vein.    She developing a partial breakdown of her distal thigh incision treated with local wound care.    Patient developed an SBO requiring surgical treatment by Dr. Ferreira on 8/11/2021.  Good recovery noted.  +3 DP pulse during her hospitalization with a clean thigh wound that had decreased in size and depth.  On chronic aspirin/Plavix due to PAD.    She has not had no issue with eating since her surgery and is doing very well.    Noticed almost complete closure no drainage from the thigh wound several days ago.    Exam: Alert and appropriate.   Blood pressure 157/75.  Pulse 72.   Right thigh incision has essentially healed with a small   scab.  2 remaining sutures removed.   +3 easily palpable DP pulses bilaterally    Impression: Healed right thigh incision with excellent blood flow following outflow revision of bypass graft.  Follow-up graft duplex 10/27/2021.  Continue on present medications.      Chadwick Lozano MD

## 2021-08-26 ENCOUNTER — OFFICE VISIT (OUTPATIENT)
Dept: OTHER | Facility: CLINIC | Age: 61
End: 2021-08-26
Attending: SURGERY
Payer: COMMERCIAL

## 2021-08-26 VITALS — DIASTOLIC BLOOD PRESSURE: 75 MMHG | HEART RATE: 72 BPM | SYSTOLIC BLOOD PRESSURE: 157 MMHG

## 2021-08-26 DIAGNOSIS — L97.112 CHRONIC ULCER OF RIGHT THIGH WITH FAT LAYER EXPOSED (H): ICD-10-CM

## 2021-08-26 DIAGNOSIS — I73.9 PAD (PERIPHERAL ARTERY DISEASE) (H): Primary | ICD-10-CM

## 2021-08-26 PROCEDURE — G0463 HOSPITAL OUTPT CLINIC VISIT: HCPCS

## 2021-08-26 PROCEDURE — 99212 OFFICE O/P EST SF 10 MIN: CPT | Performed by: SURGERY

## 2021-08-27 ENCOUNTER — TELEPHONE (OUTPATIENT)
Facility: CLINIC | Age: 61
End: 2021-08-27

## 2021-08-27 NOTE — TELEPHONE ENCOUNTER
[facility]  Post op call note - EXPLORATORY LAPAROTOMY  August 27, 2021       Shirley Hendricks was called for an update regarding her recovery.  She underwent a exploratory laparoscopy with laparoscopic lysis of adhesions converted to laparotomy with extensive lysis of adhesions by Dr. Ferreira on 8/11.  Today she tells me she is doing well and denies any complaints.  Patient has history of remote aortobifemoral bypass graft and left femoral-popliteal in situ bypass graft and saw Dr. Lozano yesterday for a follow up visit for her right distal thigh incision.  This was healing well and patient was noted to be recovering well since our surgery as well.  She is eating a low fiber diet and slowly introducing more foods.  Her bowels are regular at this time, they do alternate from soft and more firm BM's.  She currently does not need any pain medications.  The patient states she is slowly resuming normal activity but doing so slowly.  She states her wounds are healing well and the steri strips are on.  She denies any erythema or drainage at her wounds.  The patient denies fever/chills, n/v/d, abdominal pain, changes in urination or BM, or wound concerns.     Shirley is instructed to continue to stray from low fiber diet and encouraged to take vitamins and protein supplements given history of poor healing.  She was instructed to remove steri strips if she is comfortable with this and continue to keep wounds clean.  She was advised to advance her activity as tolerated with no heavy lifting > 20 lbs or strenuous exercise x 2 weeks.  The patient states all of her questions were answered and she understands our discussion.  She agrees to follow up as needed and to call our office with any concerns.    Krissy Flower PA-C  [facility]  [phone]      Please route or send letter to:  Primary Care Provider (PCP)

## 2021-09-05 ENCOUNTER — HEALTH MAINTENANCE LETTER (OUTPATIENT)
Age: 61
End: 2021-09-05

## 2021-10-18 NOTE — H&P (VIEW-ONLY)
[address] [address]  Phone: [phone]  Primary Provider: Vasquez Benoit        PREOPERATIVE EVALUATION:  Today's date: 6/21/2021    Shirley Hendricks is a 60 year old female who presents for a preoperative evaluation.    Surgical Information:  Surgery/Procedure: RIGHT FEMORAL TO POPLITEAL BYPASS and RIGHT LEG ANGIOGRAM WITH LEFT BRACHIAL ARTERY CUTDOWN  Surgery Location: [facility]   Surgeon: José Luis Hrenandez MD  Surgery Date: 06/23/2021  Time of Surgery: 12:35pm  Where patient plans to recover: Patient will be under observation-In hospital   Fax number for surgical facility: Note does not need to be faxed, will be available electronically in Epic.    Type of Anesthesia Anticipated: General    Assessment & Plan     The proposed surgical procedure is considered HIGH risk.    Preoperative examination  Patient appears medically stable to proceed with surgery as scheduled    Critical lower limb ischemia  See HPI    Coronary artery disease involving native coronary artery of native heart without angina pectoris  Recent stress echo nondiagnostic as patient did not fully reach target heart rate but did not have symptoms of chest pain or shortness of breath with the study and no EKG or wall motion abnormality changes were noted.  Has not used nitroglycerin in the last year    Thrombocytopenia (H)  Mild and improved.  Not affecting clotting status at current level  - CBC with platelets    Essential hypertension  Controlled current medication  - Basic metabolic panel    Charcot-Breonna-Tooth disease type 1A  Recent diagnosis.  Continue management per neurology.  Has appointment scheduled with PM&R in addition.  Gait not currently requiring assistance such as cane/walker    Chronic obstructive pulmonary disease, unspecified COPD type (H)  Controlled with inhaler therapy    Hyperlipidemia LDL goal <70  At goal with statin therapy    Tobacco use  disorder  On nicotine patches.  Tobacco use reduced but still smoking 4 to 5 cigarettes/day.  Again strongly instructed patient to discontinue all tobacco immediately given risk for complicating success of upcoming surgery and also putting lungs at increased risk    Hyponatremia  Minimal.  New.  Not on diuretic therapy.  Will monitor    Contrast dye allergy  Patient has been prescribed Benadryl and methylprednisolone to take prior to surgery.  See medication list for details    Risks and Recommendations:  The patient has the following additional risks and recommendations for perioperative complications:   - No identified additional risk factors other than previously addressed    Patient instructions:  No solid food after midnight   May have clear liquids up to 4 hours prior to surgery   Take usual  medications the AM of surgery  Take Methylprednisolone and Benadryl prior to procedure as instructed in med list  Try to stop  smoking completely now.  Continue Nicotine patches  Labs as ordered      RECOMMENDATION:  APPROVAL GIVEN to proceed with proposed procedure, without further diagnostic evaluation.           Subjective     HPI related to upcoming procedure:        History of right femoral graft limb to above-knee popliteal cadaveric SFA bypass with three-vessel runoff and palpable DP pulses in September 2020.  Patient developed recurrent claudication symptoms and was found to have an occlusion in her distal anastomosis to native popliteal artery.  Underwent recent angiogram of the right leg with thrombolytic therapy for the occlusion.  Approximately 3 weeks ago, patient noticed increasing pain in her right leg at night which was relieved by dangling her legs and walking.  Patient had continued smoking 1/2 to 1 pack cigarettes per day.  Patient was subsequently seen by the vascular clinic in follow-up.  Given unsatisfactory outflow the vessel, surgical intervention was recommended involving a left brachial artery  cutdown for angiography access, identification of suitable arterial targets followed by the distal right bypass graft to below-knee popliteal artery bypass utilizing a cadaveric conduit.  See below for other medical issues.  Patient was seen by me about a week ago and started on nicotine patch therapy.  She continues to smoke 4 to 5 cigarettes/day despite this but this is significantly less than what she was smoking prior to that.  With regards to current exercise tolerance, patient is able to do some yard work such as raking or general ADL walking without symptoms of chest pain or shortness of breath      Preop Questions 6/18/2021   1. Have you ever had a heart attack or stroke? YES -previous Anterior/lateral MI many years ago.  Also history of TIA and PAD   2. Have you ever had surgery on your heart or blood vessels, such as a stent placement, a coronary artery bypass, or surgery on an artery in your head, neck, heart, or legs? YES -see surgical history below   3. Do you have chest pain with activity? No   4. Do you have a history of  heart failure? No   5. Do you currently have a cold, bronchitis or symptoms of other infection? No   6. Do you have a cough, shortness of breath, or wheezing? No   7. Do you or anyone in your family have previous history of blood clots? YES -brother had a history of lower extremity DVT and son has a history of DVT/PE   8. Do you or does anyone in your family have a serious bleeding problem such as prolonged bleeding following surgeries or cuts? No   9. Have you ever had problems with anemia or been told to take iron pills? No   10. Have you had any abnormal blood loss such as black, tarry or bloody stools, or abnormal vaginal bleeding? No   11. Have you ever had a blood transfusion? No   12. Are you willing to have a blood transfusion if it is medically needed before, during, or after your surgery? Yes   13. Have you or any of your relatives ever had problems with anesthesia? No    14. Do you have sleep apnea, excessive snoring or daytime drowsiness? No   15. Do you have any artifical heart valves or other implanted medical devices like a pacemaker, defibrillator, or continuous glucose monitor? No   16. Do you have artificial joints? No   17. Are you allergic to latex? No   18. Is there any chance that you may be pregnant? -       Health Care Directive:  Patient does not have a Health Care Directive or Living Will:     Preoperative Review of :   reviewed - controlled substances prescribed by other outside provider(s).   Was prescribed a total of 6 tablets of oxycodone 5 mg on 5/20/2021 by outside physician after carpal tunnel surgery      Status of Chronic Conditions:  COPD - Patient has a longstanding history of moderate-severe COPD . Patient has been doing well overall noting NO SYMPTOMS and continues on medication regimen consisting of  Breo without adverse reactions or side effects.     HYPERLIPIDEMIA - Patient has a long history of significant Hyperlipidemia requiring medication for treatment with recent good control. Patient reports no problems or side effects with the medication.      HYPERTENSION - Patient has longstanding history of HTN , currently denies any symptoms referable to elevated blood pressure. Specifically denies chest pain, palpitations, dyspnea, orthopnea, PND. Blood pressure readings have been in normal range. Current medication regimen is as listed below. Patient denies any side effects of medication.       CAD - No recent chest pain with exertion. Underwent recent Stress ECHO that was negative for ischemia but pt did not reach target HR with the study so inconclusive. Did not have chest pain during the study. Normal EF and no WMA seen on study     PAD -  See HPI      GERD -controlled with PPI      TOBACCO USE - Smoking 1/2 - 1 ppd up until 1 week ago.  Now approximately 4 to 5 cigarettes/day.  On nicotine patch.      OSTEOPOROSIS -  On Prolia injections.  Stable      CHARCOT AMAN TOOTH - recent diagnosis. Numbness in her hands and feet.  Noted to have profound thenar and hypothenar muscle atrophy.  She also has pes cavus and hammertoes. Followed by Neurology. Not requiring braces on LEs.     Review of Systems  CONSTITUTIONAL: NEGATIVE for fever, chills, change in weight  INTEGUMENTARY/SKIN: NEGATIVE for worrisome rashes, moles or lesions  EYES: NEGATIVE for vision changes or irritation. Has readers  ENT/MOUTH: NEGATIVE for ear, mouth and throat problems  RESP: NEGATIVE for significant cough or SOB. Has reduced smoking to 4 cigs/day. Using nicotine patches  BREAST: NEGATIVE for masses, tenderness or discharge  CV: NEGATIVE for chest pain, palpitations or peripheral edema  GI: NEGATIVE for nausea, abdominal pain, heartburn, or change in bowel habits  : NEGATIVE for frequency, dysuria, or hematuria  MUSCULOSKELETAL: NEGATIVE for significant arthralgias or myalgia  NEURO:  POSITIVE for numbness in hand and feet along with reduced  strength in hands  ENDOCRINE: NEGATIVE for temperature intolerance. On statin  HEME: NEGATIVE for bleeding problems in general. Hx mild thrombocytopenia.   PSYCHIATRIC:  POSITIVE for some stress caring for her .   is blind.  Also some stress regarding recent CMT diagnosis and wondering if she will be able to continue working in the future.  Employed as a  but is not been driving recently while on medical leave with current symptoms.  Denies true depression.  No suicidal ideation    Patient Active Problem List    Diagnosis Date Noted     Health Care Home 06/06/2011     Priority: High     EMERGENCY CARE PLAN  Presenting Problem Signs and Symptoms Treatment Plan    Questions or conerns during clinic hours    I will call the clinic directly     Questions or conerns outside clinic hours    I will call the 24 hour nurse line at [phone]    Patient needs to schedule an appointment    I will call the 24 hour  Supervision was available scheduling team at [phone] or clinic directly    Same day treatment     I will call the clinic first, nurse line if after hours, urgent care and express care if needed                            DX V65.8 REPLACED WITH 62926 HEALTH CARE HOME (04/08/2013)       Critical lower limb ischemia 06/14/2021     Priority: Medium     Added automatically from request for surgery [id number]       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     Priority: Medium     Pathogenic PMP22 Duplication   [facility] #[id number]       Bilateral carpal tunnel syndrome      Priority: Medium     Thrombocytopenia (H)      Priority: Medium     History of colonic polyps 02/21/2020     Priority: Medium     SVT (supraventricular tachycardia) (H) 02/21/2020     Priority: Medium     Vitamin C deficiency 08/12/2018     Priority: Medium     Anxiety 12/07/2017     Priority: Medium     Chronic allergic rhinitis due to animal hair and dander 07/20/2017     Priority: Medium     Tobacco use disorder 07/20/2017     Priority: Medium     Chronic obstructive pulmonary disease, unspecified COPD type (H) 07/20/2017     Priority: Medium     S/P carotid endarterectomy 07/07/2016     Priority: Medium     RIGHT SIDE       Coronary artery disease involving native coronary artery of native heart without angina pectoris 03/03/2016     Priority: Medium     Primary osteoarthritis involving multiple joints 03/03/2016     Priority: Medium     DDD (degenerative disc disease), lumbar 03/03/2016     Priority: Medium     Hyperlipidemia LDL goal <70 07/07/2014     Priority: Medium     PAD (peripheral artery disease) (H) 07/07/2014     Priority: Medium     Essential hypertension 07/07/2014     Priority: Medium     Problem list name updated by automated process. 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 Simons dermatology     Embolism and thrombosis of unspecified artery (H) 08/2000    Protein C,S, Leiden FV, Lupus Inhibitor Negative     Gastroesophageal reflux disease      Hyperlipidaemia      Hypertension      Lupus (H)     skin     Mild major depression (H) 11/07/2017     Myocardial infarction (H)     x3     Osteoarthrosis, unspecified whether generalized or localized, unspecified site      PAD (peripheral artery disease) (H)      Peripheral vascular disease, unspecified (H) 12/2000    s/p angioplasty with stent right femoral a.; Right iliac and femoral a. clot     Post-menopausal      Reflux esophagitis 02/2004    EGD: esophagitis and medium HH     SVT (supraventricular tachycardia) (H)      Thrombocytopenia (H)      Uncomplicated asthma      Vitamin C deficiency 08/12/2018     Past Surgical History:   Procedure Laterality Date     ANGIOGRAM       BYPASS GRAFT FEMOROPOPLITEAL Right 09/23/2020    Procedure: RIGHT FEMORAL GRAFT TO ABOVE-KNEE POPLITEAL BYPASS USING CADAVERIC SUPERFICIAL FEMORAL ARTERY;  Surgeon: Chadwick Lozano MD;  Location:  OR     C FABRIC WRAPPING OF ABDOMINAL ANEURYSM       CARDIAC CATHERIZATION  09/03/2009    multivessel CAD without target lesions, med mgmt indicated, preserved ef     CARPAL TUNNEL RELEASE RT/LT Right 05/20/2021     ENDARTERECTOMY CAROTID Right 05/11/2016    Procedure: ENDARTERECTOMY CAROTID;  Surgeon: Chadwick Lozano MD;  Location:  OR     ENDARTERECTOMY CAROTID Left 06/08/2020    Procedure: LEFT CAROTID ENDARTERECTOMY with distal facal vein patch  and EEG;  Surgeon: Chadwick Lozano MD;  Location:  OR     GYN SURGERY  left tube    left salpingectomy     IR LOWER EXTREMITY ANGIOGRAM RIGHT  05/25/2021      LAPAROSCOPIC CHOLECYSTECTOMY N/A 09/27/2017    Procedure: LAPAROSCOPIC CHOLECYSTECTOMY;  LAPAROSCOPIC CHOLECYSTECTOMY;  Surgeon: Jacoby aTpia MD;  Location: [facility]     ORTHOPEDIC SURGERY      left knee surgery     VASCULAR SURGERY  aoto bi fem  left fem-AK pop     [facility] NONSPECIFIC PROCEDURE  12/2000    angioplasty with stent right fem. a.     [facility] NONSPECIFIC PROCEDURE  1987    sinus surgery     [facility] NONSPECIFIC PROCEDURE  09/02/2009    Emergent left groin exploration with oversewing of bleeding angiographic site. 2. Endarterectomy of common femoral-proximal superficial femoral artery with greater saphenous vein patch angioplasty.   a. [place] of accessory right greater saphenous vein.      [facility] NONSPECIFIC PROCEDURE  08/27/2009    occluded left common iliac and external iliac arteries were successfully revascularized with stenting to 8 and 7 mm      Current Outpatient Medications   Medication Sig Dispense Refill     acetaminophen (TYLENOL) 500 MG tablet Take 500-1,000 mg by mouth every 6 hours as needed for mild pain       amLODIPine (NORVASC) 5 MG tablet Take 1 tablet (5 mg) by mouth 2 times daily 180 tablet 3     aspirin (ASA) 81 MG chewable tablet Take 1 tablet (81 mg) by mouth daily 30 tablet 3     BREO ELLIPTA 200-25 MCG/INH Inhaler Inhale 1 puff into the lungs daily (Patient taking differently: Inhale 1 puff into the lungs daily ) 3 Inhaler 3     buPROPion (ZYBAN) 150 MG 12 hr tablet Take 1 tablet (150 mg) by mouth 2 times daily Take one tablet daily for the first 3 days, then take twice daily after that. 60 tablet 11     CALCIUM 600-D 600-400 MG-UNIT TABS Take 1 tablet by mouth 2 times daily 180 tablet 3     CALCIUM PO Take 1 tablet by mouth every evening       carvedilol (COREG) 6.25 MG tablet TAKE ONE TABLET BY MOUTH TWICE A DAY WITH MEALS  180 tablet 3     clopidogrel (PLAVIX) 75 MG tablet Take 1 tablet (75 mg) by mouth daily 90 tablet 3     denosumab (PROLIA) 60 MG/ML SOLN injection Inject 1 mL (60  mg) Subcutaneous every 6 months INDICATION: TO TREAT OSTEOPOROSIS 1 Syringe 0     diphenhydrAMINE (BENADRYL) 25 MG tablet Take 25 to 50mg once 30 minutes to 2 hours before procedure. 2 tablet 0     fluticasone (FLONASE) 50 MCG/ACT nasal spray Spray 1 spray into both nostrils daily 16 g 0     lisinopril (ZESTRIL) 20 MG tablet Take 1 tablet (20 mg) by mouth 2 times daily. 180 tablet 3     methylPREDNISolone (MEDROL) 16 MG tablet Take 32 mg by mouth 12 hours before the procedure and repeat 32 mg by mouth 2 hours before the procedure to prevent contrast reaction. 4 tablet 0     nicotine (NICODERM CQ) 14 MG/24HR 24 hr patch Place 1 patch onto the skin every 24 hours 30 patch 1     nicotine (NICODERM CQ) 21 MG/24HR 24 hr patch Place 1 patch onto the skin every 24 hours 30 patch 0     nicotine (NICODERM CQ) 7 MG/24HR 24 hr patch Place 1 patch onto the skin every 24 hours Start after completing 14 mg patches. 30 patch 1     nitroGLYcerin (NITROSTAT) 0.4 MG sublingual tablet Place 1 tablet (0.4 mg) under the tongue See Admin Instructions for chest pain 30 tablet 11     omeprazole (PRILOSEC) 20 MG DR capsule TAKE ONE CAPSULE BY MOUTH DAILY (Patient taking differently: Take 20 mg by mouth daily TAKE ONE CAPSULE BY MOUTH DAILY) 90 capsule 3     rosuvastatin (CRESTOR) 40 MG tablet Take 1 tablet (40 mg) by mouth At Bedtime 90 tablet 2       Allergies   Allergen Reactions     Contrast Dye Anaphylaxis     RASH, FACIAL AND NECK SWELLING, SOB, WHEEZING     Pantoprazole      Protonix caused diffuse edema     Chantix [Varenicline]      Terrible dreams     Penicillins Itching        Social History     Tobacco Use     Smoking status: Current Every Day Smoker     Packs/day: 0.25     Years: 40.00     Pack years: 10.00     Types: Cigarettes     Start date: 1958     Smokeless tobacco: Never Used     Tobacco comment: 1/2 PPD   Substance Use Topics     Alcohol use: Yes     Alcohol/week: 0.0 standard drinks     Comment: x1-2 yr     Family  "History   Problem Relation Age of Onset     Cancer Mother         bladder     Cardiovascular Father         alive,multiple heart attacks     Diabetes Maternal Grandmother      Lung Cancer Maternal Grandmother      Blood Disease Brother         clotting disorder     History   Drug Use No         Objective     /62   Pulse 60   Temp 97.8  F (36.6  C) (Temporal)   Resp 16   Ht 1.575 m (5' 2\")   Wt 49.4 kg (109 lb)   SpO2 96%   BMI 19.94 kg/m      Physical Exam  HENT: ear canals and TM's normal and nose and mouth without ulcers or lesions   Neck: no adenopathy. Thyroid normal to palpation. No bruits  Pulm: Lungs clear to auscultation with slight prolonged expiratory phase.  No wheezes or rhonchi   CV: Regular rates and rhythm  GI: Soft, nontender, Normal active bowel sounds, No hepatosplenomegaly or masses palpable  Ext: Strong radial and femoral pulses bilaterally.  Palpable left dorsalis pedis/posterior tibial pulse.  Pulse not palpable right foot.  Bilateral feet are warm to touch.  No edema. Thenar and hypothenar muscle atrophy  Neuro: Decreased light touch sensation distal bilateral upper lower extremities.  Dorsiflexion strength 4/5 bilaterally    Recent Labs   Lab Test 05/25/21  1731 05/13/21  1832 01/05/21  1119 09/25/20  0807 09/24/20  0739 09/23/20  0844 09/23/20  0844 06/08/20  1302 06/08/20  1302   HGB 11.9 11.9 12.8 12.8 11.4*  --   --    < > 14.1   * 110* 133* 158 114*   < >  --    < > 149*   INR  --   --   --   --  1.01  --   --   --   --    NA  --   --  134 139 133  --   --    < > 137   POTASSIUM  --   --  4.4 3.6 4.1  --  4.1   < > 4.1   CR  --   --  0.58 0.53 0.47*  --  0.47*   < > 0.56   A1C  --   --   --   --   --   --  5.4  --  5.5    < > = values in this interval not displayed.        Diagnostics:  Component      Latest Ref Rng & Units 6/21/2021   Sodium      133 - 144 mmol/L 130 (L)   Potassium      3.4 - 5.3 mmol/L 4.6   Chloride      94 - 109 mmol/L 100   Carbon Dioxide      " 20 - 32 mmol/L 30   Anion Gap      3 - 14 mmol/L <1 (L)   Glucose      70 - 99 mg/dL 78   Urea Nitrogen      7 - 30 mg/dL 13   Creatinine      0.52 - 1.04 mg/dL 0.65   GFR Estimate      >60 mL/min/1.73:m2 >90   GFR Estimate If Black      >60 mL/min/1.73:m2 >90   Calcium      8.5 - 10.1 mg/dL 8.7   WBC      4.0 - 11.0 10e9/L 4.8   RBC Count      3.8 - 5.2 10e12/L 3.90   Hemoglobin      11.7 - 15.7 g/dL 12.4   Hematocrit      35.0 - 47.0 % 37.7   MCV      78 - 100 fl 97   MCH      26.5 - 33.0 pg 31.8   MCHC      31.5 - 36.5 g/dL 32.9   RDW      10.0 - 15.0 % 12.9   Platelet Count      150 - 450 10e9/L 124 (L)         EKG 5/13/21: Sinus bradycardia with rate 56.  Poor R wave progression consistent with old anterior infarct.  No acute ST/T changes.  No change from previous EKGs    Revised Cardiac Risk Index (RCRI):  The patient has the following serious cardiovascular risks for perioperative complications:   - Coronary Artery Disease (MI, positive stress test, angina, Qs on EKG) = 1 point   - Cerebrovascular Disease (TIA or CVA) = 1 point      RCRI Interpretation: 2 points: Class III (moderate risk - 6.6% complication rate)     Estimated Functional Capacity: Performs 4 METS exercise without symptoms (e.g., light housework, stairs, 4 mph walk, 7 mph bike, slow step dance)  Patient underwent recent stress echo 4/16/21 and did not have symptoms of chest pain or shortness of breath with this and stopped due to fatigue.  No EKG changes or WMA changes were seen during the study.  However, patient was not able to reach target heart rate  Stress Results         Protocol:  Eliezer Protocol        Maximum Predicted HR:   158 bpm         Target HR: 134 bpm               % Maximum Predicted HR: 66 %        Stage  DurationHeart Rate  BP                    Comment             (mm:ss)   (bpm)    Stage 1   3:00      104   158/80Patient requested to stop   RecoveryR  4:00      56    134/74RPP = 75589, [place] = 2, FAC = Below  average             Stress Duration:   3:00 mm:ss        Recovery Time: 4:00 mm:ss          Maximum Stress HR: 104 bpm           METS:          4           Signed Electronically by: Vasquez Benoit MD  Copy of this evaluation report is provided to requesting physician.

## 2021-10-19 DIAGNOSIS — I73.9 PERIPHERAL VASCULAR DISEASE (H): ICD-10-CM

## 2021-10-19 PROBLEM — F32.9 MAJOR DEPRESSION: Status: RESOLVED | Noted: 2017-11-07 | Resolved: 2020-09-21

## 2021-10-19 NOTE — TELEPHONE ENCOUNTER
Routing refill request to provider for review/approval because:  Hemoglobin   Date Value Ref Range Status   08/15/2021 9.4 (L) 11.7 - 15.7 g/dL Final   07/09/2021 8.8 (L) 11.7 - 15.7 g/dL Final   ]          Urvashi Mackey RN  [facility]

## 2021-10-20 RX ORDER — CLOPIDOGREL BISULFATE 75 MG/1
75 TABLET ORAL DAILY
Qty: 90 TABLET | Refills: 3 | Status: SHIPPED | OUTPATIENT
Start: 2021-10-20 | End: 2022-10-30

## 2021-10-28 ENCOUNTER — TELEPHONE (OUTPATIENT)
Dept: OTHER | Facility: CLINIC | Age: 61
End: 2021-10-28

## 2021-10-28 NOTE — TELEPHONE ENCOUNTER
Rachel's father passed away.     She will call once things settle down to reschedule.     Appointments have been cancelled for 10/28/2021    Hollie ASENCIO   Routing to nurse MO as JOHNSON

## 2021-10-28 NOTE — TELEPHONE ENCOUNTER
Pt was a no show for 10/28/21 Carotid U/S and XAVIER. Pt imaging needs to be rescheduled and completed before 11/11/21 scheduled OV with Dr. Lozano.     Routing to  to coordinate.     ASHLEY Johnson, RN  [facility]  Office:  [phone] Fax: [phone]

## 2021-10-28 NOTE — TELEPHONE ENCOUNTER
I notified Dr. Lozano of this and he is aware of it.     ESTHER JohnsonN, RN  [facility]  Office:  [phone] Fax: [phone]

## 2021-10-28 NOTE — TELEPHONE ENCOUNTER
JUDITH to reschedule 3 US and in person visit with Dr. Lozano at next available that were NO SHOWED on 10/28/2021.    Hollie ASENCIO

## 2021-11-04 DIAGNOSIS — J44.9 CHRONIC OBSTRUCTIVE PULMONARY DISEASE, UNSPECIFIED COPD TYPE (H): Primary | ICD-10-CM

## 2021-11-04 NOTE — TELEPHONE ENCOUNTER
Routing refill request to provider for review/approval because:     Asthma control assessment score within normal limits in last 6 months     Christina Salinas RN

## 2021-11-16 NOTE — PROGRESS NOTES
[facility]    Shirley Hendricks returns for vascular follow-up.  She has had multiple vascular procedures.  This includes a remote aorto bifemoral bypass graft and a left femoral to popliteal in situ bypass graft which is worked very well.  More recently underwent a right cadaveric SFA artery bypass from the common femoral artery to above-knee popliteal artery.  This developed occlusion in the mid popliteal artery (SFA graft widely patent) treated with angioplasty but recurrent stenosis developed in the popliteal artery.  Underwent revision from the distal graft to below-knee popliteal artery using cephalic vein performed on 7/7/2021 with very good results.    Had issues with wound healing of her proximal thigh incision but responded to conservative treatment.  Completely healed by 8/26/2021.    Developed an SBO requiring exploratory laparotomy with lysis of adhesions on 08/11/2021 with good results.  +3 palpable DP pulses bilaterally at that time.    ROS: No recurrent claudication symptoms in the right leg after revision.  Did notice some balance issues when she was out raking her leaves last week that she is going to have evaluated.      EXAM: Alert and appropriate.  Comfortable.  Normal affect.   Blood pressure 125/69.  Pulse 51   Chest= clear   Cardiovascular= regular rate   Well healed right thigh and calf incisions.  Mild distal calf and ankle edema   +3 palpable DP pulses bilaterally.   +3 palpable right and left thigh portions of bypass grafts     Current area over left arm brachial cutdown from prior angiogram.  Nonpulsatile    Good radial pulse.  No evidence of this being a pseudoaneurysm         Graft duplex reveals a widely patent limb of the aorta by femoral graft.  The right femoral above-knee popliteal bypass graft is widely patent.  The above-knee to below-knee popliteal graft is also widely patent though slightly narrowed at the distal anastomosis at 2.2 mm with an elevated  velocity.  However the native artery only measures 2.8 mm.    Carotid duplex revealed both CEAs are patent.  On the right there is a 60% diameter reduction of the common carotid artery with the ICA patch being widely patent.  Very mild wall thickening of the left common carotid artery with a widely patent ICA.    Bilateral ABIs were also performed today.  This is 0.9 on the right and 0.95 on the left at rest.  All waveforms were triphasic or biphasic indicative of good flow.            IMPRESSION: #1.  Patent revision right bypass graft to the below-knee popliteal artery.  Arm vein was used.  This is smaller distally at 2.2 mm but still very adequate flow.  Need to watch this closely with follow-up duplex will be performed in 3 months.  Discussed with patient.     #2.  Widely patent left femoral popliteal in situ bypass graft with excellent outflow.  Being followed on a yearly basis.       #3.  Both CEAs widely patent in the ICA.  Stable stenosis of the right common carotid artery with no clinical significance.  Follow-up in 1 year.    20 minutes with patient today.  We will see her again in 3 months.       Chadwick Lozano MD

## 2021-11-18 ENCOUNTER — HOSPITAL ENCOUNTER (OUTPATIENT)
Dept: ULTRASOUND IMAGING | Facility: CLINIC | Age: 61
End: 2021-11-18
Attending: SURGERY
Payer: COMMERCIAL

## 2021-11-18 ENCOUNTER — OFFICE VISIT (OUTPATIENT)
Dept: OTHER | Facility: CLINIC | Age: 61
End: 2021-11-18
Attending: SURGERY
Payer: COMMERCIAL

## 2021-11-18 VITALS — SYSTOLIC BLOOD PRESSURE: 125 MMHG | HEART RATE: 51 BPM | DIASTOLIC BLOOD PRESSURE: 69 MMHG

## 2021-11-18 DIAGNOSIS — Z98.890 HISTORY OF BILATERAL CAROTID ENDARTERECTOMY: ICD-10-CM

## 2021-11-18 DIAGNOSIS — I73.9 PAD (PERIPHERAL ARTERY DISEASE) (H): ICD-10-CM

## 2021-11-18 DIAGNOSIS — I73.9 PAD (PERIPHERAL ARTERY DISEASE) (H): Primary | ICD-10-CM

## 2021-11-18 DIAGNOSIS — I70.229 CRITICAL ISCHEMIA OF EXTREMITY WITH HISTORY OF REVASCULARIZATION OF SAME EXTREMITY (H): ICD-10-CM

## 2021-11-18 DIAGNOSIS — Z98.890 CRITICAL ISCHEMIA OF EXTREMITY WITH HISTORY OF REVASCULARIZATION OF SAME EXTREMITY (H): ICD-10-CM

## 2021-11-18 DIAGNOSIS — I65.23 BILATERAL CAROTID ARTERY STENOSIS: ICD-10-CM

## 2021-11-18 PROCEDURE — 93922 UPR/L XTREMITY ART 2 LEVELS: CPT

## 2021-11-18 PROCEDURE — 93926 LOWER EXTREMITY STUDY: CPT | Mod: RT

## 2021-11-18 PROCEDURE — 99213 OFFICE O/P EST LOW 20 MIN: CPT | Performed by: SURGERY

## 2021-11-18 PROCEDURE — G0463 HOSPITAL OUTPT CLINIC VISIT: HCPCS | Mod: 25

## 2021-11-18 PROCEDURE — 93880 EXTRACRANIAL BILAT STUDY: CPT

## 2021-11-18 NOTE — PROGRESS NOTES
[facility]        Patient is here for a  follow up.     Pt is currently taking Aspirin, Statin and Plavix.    /69 (BP Location: Left arm, Patient Position: Chair, Cuff Size: Adult Regular)   Pulse 51   Breastfeeding No     The provider has been notified that the patient has no concerns.     Questions patient would like addressed today are: N/A.    Refills are needed: N/A    Has homecare services and agency name:  Isa Al MA

## 2021-11-23 DIAGNOSIS — I73.9 PAD (PERIPHERAL ARTERY DISEASE) (H): Primary | ICD-10-CM

## 2022-01-11 DIAGNOSIS — M81.0 OSTEOPOROSIS, UNSPECIFIED OSTEOPOROSIS TYPE, UNSPECIFIED PATHOLOGICAL FRACTURE PRESENCE: Primary | ICD-10-CM

## 2022-01-12 NOTE — TELEPHONE ENCOUNTER
Routing refill request to provider for review/approval because:  Medication is reported/historical    Appointments in Next Year      Jan 18, 2022  4:30 PM  (Arrive by 4:10 PM)  Provider Visit with Vasquez Benoit MD  [facility] ([facility] ) [phone]           Chelsie Spann RN

## 2022-01-18 ENCOUNTER — VIRTUAL VISIT (OUTPATIENT)
Dept: INTERNAL MEDICINE | Facility: CLINIC | Age: 62
End: 2022-01-18
Payer: COMMERCIAL

## 2022-01-18 DIAGNOSIS — I73.9 PAD (PERIPHERAL ARTERY DISEASE) (H): ICD-10-CM

## 2022-01-18 DIAGNOSIS — J44.9 CHRONIC OBSTRUCTIVE PULMONARY DISEASE, UNSPECIFIED COPD TYPE (H): ICD-10-CM

## 2022-01-18 DIAGNOSIS — E78.5 HYPERLIPIDEMIA LDL GOAL <70: ICD-10-CM

## 2022-01-18 DIAGNOSIS — F17.200 TOBACCO USE DISORDER: ICD-10-CM

## 2022-01-18 DIAGNOSIS — G60.0 CHARCOT-MARIE-TOOTH DISEASE TYPE 1A: ICD-10-CM

## 2022-01-18 DIAGNOSIS — I10 ESSENTIAL HYPERTENSION: ICD-10-CM

## 2022-01-18 PROCEDURE — 99214 OFFICE O/P EST MOD 30 MIN: CPT | Mod: GT | Performed by: INTERNAL MEDICINE

## 2022-01-18 ASSESSMENT — ANXIETY QUESTIONNAIRES
5. BEING SO RESTLESS THAT IT IS HARD TO SIT STILL: NOT AT ALL
GAD7 TOTAL SCORE: 0
6. BECOMING EASILY ANNOYED OR IRRITABLE: NOT AT ALL
7. FEELING AFRAID AS IF SOMETHING AWFUL MIGHT HAPPEN: NOT AT ALL
3. WORRYING TOO MUCH ABOUT DIFFERENT THINGS: NOT AT ALL
1. FEELING NERVOUS, ANXIOUS, OR ON EDGE: NOT AT ALL
2. NOT BEING ABLE TO STOP OR CONTROL WORRYING: NOT AT ALL
IF YOU CHECKED OFF ANY PROBLEMS ON THIS QUESTIONNAIRE, HOW DIFFICULT HAVE THESE PROBLEMS MADE IT FOR YOU TO DO YOUR WORK, TAKE CARE OF THINGS AT HOME, OR GET ALONG WITH OTHER PEOPLE: NOT DIFFICULT AT ALL

## 2022-01-18 ASSESSMENT — PATIENT HEALTH QUESTIONNAIRE - PHQ9
5. POOR APPETITE OR OVEREATING: NOT AT ALL
SUM OF ALL RESPONSES TO PHQ QUESTIONS 1-9: 0

## 2022-01-18 NOTE — PROGRESS NOTES
Shirley is a 61 year old who is being evaluated via a billable video visit.      How would you like to obtain your AVS? [employer]  If the video visit is dropped, the invitation should be resent by: Other e-mail: [employer]  Will anyone else be joining your video visit? No       ASSESSMENT:   1. PAD (peripheral artery disease) (H)  Denies current claudication pain in lower extremities though walking activity is much less recently with CMT. Has follow-up scheduled with vascular clinic in February. Denies current ulcerations lower extremities. Continue current medical management    2. Tobacco use disorder  Strongly counseled regarding need for smoking cessation with vascular disease and lung disease. Patient not interested in quitting at this time. Patient's  also smoking. Patient does have nicotine patches at home she can use and also has a telephone number for the [employer]    3. Charcot-Breonan-Tooth disease type 1A  Progressive weakness in lower extremities and some in hands. Continue management per neurology. No recent fall per patient    4. Chronic obstructive pulmonary disease, unspecified COPD type (H)  Stable breathing despite smoking. Rare cough. Continue Breo inhaler. Counseled regarding smoking cessation    5. Hyperlipidemia LDL goal <70  LDL minimally above goal. On rosuvastatin 40 mg daily. Will repeat lipids June 2022. If LDL greater than 70 still, will consider addition of Zetia    6. Essential hypertension  Controlled. Continue current medication      PLAN:  Continue current meds  Call  [phone] or use [employer] to schedule a future lab appointment  fasting in  June 2022   For fasting labs, please refrain from eating for 8 hours or more.   Drink 2 glasses of water before your lab appointment. It is fine to take your  oral medications on the morning of the lab test as usual  Recommend you get a COVID booster vaccination and influenza vaccination. Can schedule through [facility] pharmacy or any other local  pharmacy  Follow-up with vascular clinic/Dr Lozano in February 2022 as scheduled  Follow-up with neurology regarding CMT  I encourage you to stop all smoking.  If  willing to quit in the future,   contact  [employer] toll-free number (1-662-QUIT-NOW) or through the internet  (quitpartnermn.com) for free nicotine supplementation treatment and/or counseling        Video-Visit Details    Type of service:  Video Visit    Video Start Time: 4:52pm    Video End Time:5:11pm    Originating Location (pt. Location): Home    Distant Location (provider location):  [facility]     Platform used for Video Visit: Debby Benoit MD  Internal Medicine Department  [facility]  Internal Medicine Department      (Chart documentation was completed, in part, with [employer] voice-recognition software. Even though reviewed, some grammatical, spelling, and word errors may remain.)         Vishal Watson is a 61 year old who presents for the following health issues     HPI   Chief Complaint   Patient presents with     Hypertension     Hyperlipidemia     Orders     Patient requesting smaller B/P cuff since loss of weight     Hyperlipidemia Follow-Up      Are you regularly taking any medication or supplement to lower your cholesterol?   Yes- Rosuvastatin    Are you having muscle aches or other side effects that you think could be caused by your cholesterol lowering medication?  No    Hypertension Follow-up      Do you check your blood pressure regularly outside of the clinic? Yes     Are you following a low salt diet? Yes    Are your blood pressures ever more than 140 on the top number (systolic) OR more   than 90 on the bottom number (diastolic), for example 140/90?  no    PHQ 4/20/2020 1/5/2021 1/18/2022   PHQ-9 Total Score 2 10 0   Q9: Thoughts of better off dead/self-harm past 2 weeks Not at all Not at all Not at all     TYRON-7 SCORE 5/3/2019 6/17/2021 1/18/2022   Total  Score - - -   Total Score 0 0 0         Most recent lab results reviewed with pt.      Patient continues to smoke after vascular surgery. Patient's  also smoking.  Patient not motivated to quit smoking at this time. Rare cough. Denies shortness of breath.  Patient states blood pressures systolically less than 140. Patient states appetite has improved and weight now 104 pounds at home.  Has follow-up appointment with vascular clinic in February.  Denies current claudication pain in lower extremities with ambulation. Denies chest pain  Progressive weakness in lower extremities. Has been diagnosed with CMT and being managed by neurology.    No recent fall  Mood overall OK. See TYRON and PHQ9 above      Lab Results   Component Value Date    CHOL 143 07/07/2021     Lab Results   Component Value Date    HDL 53 07/07/2021     Lab Results   Component Value Date    LDL 76 07/07/2021     Lab Results   Component Value Date    TRIG 71 07/07/2021     Lab Results   Component Value Date    CHOLHDLRATIO 2.6 03/15/2013       Additional ROS:   Constitutional, HEENT, Cardiovascular, Pulmonary, GI and , Neuro, MSK and Psych review of systems/symptoms are otherwise negative or unchanged from previous, except as noted above.           Objective :  Weight reported to be 104 pounds at home. /64 reported    Physical Exam:  GENERAL:   alert and no distress  EYES: Eyes grossly normal to inspection, conjunctivae and sclerae normal  RESP: no audible wheeze, cough, or visible cyanosis.  No visible retractions or increased work of breathing.  Able to speak fully in complete sentences.  NEURO: Cranial nerves grossly intact, mentation intact and speech normal  PSYCH: mentation appears normal, affect normal/bright, judgement and insight intact, normal speech and appearance well-groomed

## 2022-01-19 ASSESSMENT — ANXIETY QUESTIONNAIRES: GAD7 TOTAL SCORE: 0

## 2022-01-25 DIAGNOSIS — K21.00 GASTROESOPHAGEAL REFLUX DISEASE WITH ESOPHAGITIS WITHOUT HEMORRHAGE: ICD-10-CM

## 2022-01-27 NOTE — TELEPHONE ENCOUNTER
Routing refill request to provider for review/approval because:  Failed protocol due to:    Not on Clopidogrel (unless Pantoprazole ordered)    No diagnosis of osteoporosis on record       Dennis Ring RN  [facility] Triage Nurse

## 2022-02-07 NOTE — PATIENT INSTRUCTIONS
Continue current meds  Call  [phone] or use [employer] to schedule a future lab appointment  fasting in  June 2022   For fasting labs, please refrain from eating for 8 hours or more.   Drink 2 glasses of water before your lab appointment. It is fine to take your  oral medications on the morning of the lab test as usual  Recommend you get a COVID booster vaccination and influenza vaccination. Can schedule through [facility] pharmacy or any other local pharmacy  Follow-up with vascular clinic/Dr Lozano in February 2022 as scheduled  Follow-up with neurology regarding CMT  I encourage you to stop all smoking.  If  willing to quit in the future,   contact  [employer] toll-free number (3-344-QUIT-NOW) or through the internet  ([employer].com) for free nicotine supplementation treatment and/or counseling

## 2022-02-10 ENCOUNTER — HOSPITAL ENCOUNTER (OUTPATIENT)
Dept: ULTRASOUND IMAGING | Facility: CLINIC | Age: 62
End: 2022-02-10
Attending: SURGERY
Payer: COMMERCIAL

## 2022-02-10 ENCOUNTER — OFFICE VISIT (OUTPATIENT)
Dept: OTHER | Facility: CLINIC | Age: 62
End: 2022-02-10
Attending: SURGERY
Payer: COMMERCIAL

## 2022-02-10 VITALS — SYSTOLIC BLOOD PRESSURE: 144 MMHG | DIASTOLIC BLOOD PRESSURE: 74 MMHG | HEART RATE: 61 BPM

## 2022-02-10 DIAGNOSIS — T82.898A: Primary | ICD-10-CM

## 2022-02-10 DIAGNOSIS — Z11.59 ENCOUNTER FOR SCREENING FOR OTHER VIRAL DISEASES: Primary | ICD-10-CM

## 2022-02-10 DIAGNOSIS — I73.9 PAD (PERIPHERAL ARTERY DISEASE) (H): ICD-10-CM

## 2022-02-10 PROCEDURE — 93926 LOWER EXTREMITY STUDY: CPT | Mod: RT

## 2022-02-10 PROCEDURE — G0463 HOSPITAL OUTPT CLINIC VISIT: HCPCS

## 2022-02-10 PROCEDURE — 99214 OFFICE O/P EST MOD 30 MIN: CPT | Performed by: SURGERY

## 2022-02-10 NOTE — PROGRESS NOTES
[place] [facility]    Shirley Hendricks was scheduled to see me next week and had an appointment today for ultrasound of the right leg bypass graft outflow revision.  She has been noticing some recurrent vascular issues with her right leg with coolness in the morning and discoloration along with some mild rest pain but no ulcers.  No problems with left leg.    She started noticed discomfort in her right leg 3 to 4 weeks ago.  She was unsure whether this was related to her chronic neuropathy issues.  She now notices very short distance claudication even within the house and some occasional rest pain but no ulcers.  No problems except for mild ankle swelling in the left leg.    PMH: Medications: Zestril, Coreg, Norvasc, Prolia, Crestor, Prilosec, Plavix, aspirin,   Brio Ellipta inhaler     VASCULAR HISTORY: Remote aortobifemoral bypass graft       Left femoral to popliteal in situ bypass graft       Right CEA 5/2016--11/18/21 duplex with 60% CCA diameter reduction       Left CEA 6/2020--normal carotid duplex follow-up           9/23/2020 right femoral lobe to above-knee popliteal cadaveric SFA bypass    Three-vessel runoff at that time      Found occlusion of the above-knee popliteal artery 5/25/2021 with angiogram and successful thrombectomy/PTA.       Reocclusion of above-knee popliteal artery with patent cadaveric SFA graft.  Above-knee popliteal graft to below-knee popliteal artery bypass with translocated cephalic vein by Dr. Hernandez on 7/7/2021.  Did well with +3 DP pulse bilaterally.  Some issues with thigh incision healing treated conservatively.  Graft patent on duplex 11/18/2021 though smaller distally at 2.2 mm      Review of 6/23/2021 angiogram interoperatively shows a patent mid and distal popliteal artery with anterior tibial/peroneal runoff with PT being occluded.  The popliteal artery does have retrograde flow to above the femoral condyle noted.    Exam: Alert and appropriate.  Normal  affect.   Vital signs stable.   Chest= clear   Cardiovascular= regular rate   Extremities= right foot is cooler than left.  Normal CMS.  No ulcers    Mild swelling left ankle but not right   +3 palpable right thigh graft pulse.  +3 left graft and DP pulse   Bedside Doppler with biphasic flow in right graft but very weak monophasic    To right AT    Triphasic flow in left graft and DP    Duplex today shows that the right graft to the above-knee popliteal artery is patent but the jump graft to below-knee popliteal artery is occluded with very slow flow in the below-knee popliteal and tibial arteries.        Impression: Symptomatic occlusion of jump graft to the below-knee popliteal artery right leg.  This is at the several weeks old and with the smallest left upper arm cephalic vein bypass there is no chance this can be opened with lytic therapy.  She is quite symptomatic and a redo bypass is necessary.  Some consideration is whether we can bypassed to the above-knee popliteal artery using a shorter graft or whether we have to go to below-knee popliteal artery through scar tissue.  Autogenous veins are not adequate and thus we will plan to use it cadaveric SFA artery for the revision bypass.    I do have some concerns with healing that she has had issues in the past.  Does need to quit smoking which will help the healing.    We will hold her Plavix until postoperatively hoping to get her on the operating schedule this coming week.    25 minutes with patient today      Chadwick Lozano MD  This note was created using Dragon voice recognition software which may result in transcription errors.

## 2022-02-10 NOTE — PROGRESS NOTES
[facility]        Patient is here for a  follow up.      Pt is currently taking Aspirin, Statin and Plavix.    BP (!) 144/74 (BP Location: Left arm, Patient Position: Chair, Cuff Size: Adult Regular)   Pulse 61   Breastfeeding No     The provider has been notified that the patient has no concerns.     Questions patient would like addressed today are: N/A.    Refills are needed: N/A    Has homecare services and agency name:  Isa Al MA

## 2022-02-10 NOTE — NURSING NOTE
Patient Education    Procedure: RIGHT SFA GRAFT TO BELOW KNEE POPLITEAL ARTERY BYPASS WITH CADAVERIC SFA  Diagnosis: SYMPTOMATIC OCCLUSION OF RIGHT LE JUMP GRAFT TO BELOW KNEE POPLITEAL ARTERY BYPASS  Anticoagulation Instruction: HOLD PLAVIX 5 DAYS PRIOR  Pre-Operative Physical Exam: You need to have a pre-op physical exam within 30 days of your procedure. Your procedure may be cancelled if you do not have a current History and Physical. Call your PCP's office to schedule.  Allergies:  Updated in Epic  Bowel Prep: NPO per protocol.   Post Procedure Education: [facility] post-procedure fact sheet reviewed with patient.    COVID-19 instructions for isolation and pre testing reviewed with pt.    Learner(s):patient  Method: Listening, Reading  Barriers to Learning:No Barrier  Outcome: Patient did verbalize understanding of above education.    Alaina Yanez, ESTHERN, RN-[facility]

## 2022-02-11 ENCOUNTER — LAB (OUTPATIENT)
Dept: URGENT CARE | Facility: URGENT CARE | Age: 62
End: 2022-02-11
Attending: SURGERY
Payer: COMMERCIAL

## 2022-02-11 ENCOUNTER — OFFICE VISIT (OUTPATIENT)
Dept: INTERNAL MEDICINE | Facility: CLINIC | Age: 62
End: 2022-02-11
Payer: COMMERCIAL

## 2022-02-11 VITALS
OXYGEN SATURATION: 94 % | BODY MASS INDEX: 19.62 KG/M2 | WEIGHT: 106.6 LBS | DIASTOLIC BLOOD PRESSURE: 60 MMHG | RESPIRATION RATE: 16 BRPM | TEMPERATURE: 97.9 F | SYSTOLIC BLOOD PRESSURE: 140 MMHG | HEART RATE: 58 BPM | HEIGHT: 62 IN

## 2022-02-11 DIAGNOSIS — Z11.59 ENCOUNTER FOR SCREENING FOR OTHER VIRAL DISEASES: Primary | ICD-10-CM

## 2022-02-11 DIAGNOSIS — Z01.818 PREOP GENERAL PHYSICAL EXAM: Primary | ICD-10-CM

## 2022-02-11 DIAGNOSIS — I73.9 PAD (PERIPHERAL ARTERY DISEASE) (H): ICD-10-CM

## 2022-02-11 DIAGNOSIS — E78.5 HYPERLIPIDEMIA LDL GOAL <70: ICD-10-CM

## 2022-02-11 DIAGNOSIS — I10 ESSENTIAL HYPERTENSION: ICD-10-CM

## 2022-02-11 DIAGNOSIS — J44.9 CHRONIC OBSTRUCTIVE PULMONARY DISEASE, UNSPECIFIED COPD TYPE (H): ICD-10-CM

## 2022-02-11 DIAGNOSIS — Z11.59 ENCOUNTER FOR SCREENING FOR OTHER VIRAL DISEASES: ICD-10-CM

## 2022-02-11 DIAGNOSIS — I25.10 CORONARY ARTERY DISEASE INVOLVING NATIVE CORONARY ARTERY OF NATIVE HEART WITHOUT ANGINA PECTORIS: ICD-10-CM

## 2022-02-11 LAB
ANION GAP SERPL CALCULATED.3IONS-SCNC: 5 MMOL/L (ref 3–14)
BUN SERPL-MCNC: 14 MG/DL (ref 7–30)
CALCIUM SERPL-MCNC: 8.8 MG/DL (ref 8.5–10.1)
CHLORIDE BLD-SCNC: 101 MMOL/L (ref 94–109)
CO2 SERPL-SCNC: 26 MMOL/L (ref 20–32)
CREAT SERPL-MCNC: 0.67 MG/DL (ref 0.52–1.04)
ERYTHROCYTE [DISTWIDTH] IN BLOOD BY AUTOMATED COUNT: 15.7 % (ref 10–15)
GFR SERPL CREATININE-BSD FRML MDRD: >90 ML/MIN/1.73M2
GLUCOSE BLD-MCNC: 94 MG/DL (ref 70–99)
HCT VFR BLD AUTO: 38.9 % (ref 35–47)
HGB BLD-MCNC: 12.6 G/DL (ref 11.7–15.7)
MCH RBC QN AUTO: 27.9 PG (ref 26.5–33)
MCHC RBC AUTO-ENTMCNC: 32.4 G/DL (ref 31.5–36.5)
MCV RBC AUTO: 86 FL (ref 78–100)
PLATELET # BLD AUTO: 190 10E3/UL (ref 150–450)
POTASSIUM BLD-SCNC: 4.4 MMOL/L (ref 3.4–5.3)
RBC # BLD AUTO: 4.51 10E6/UL (ref 3.8–5.2)
SODIUM SERPL-SCNC: 132 MMOL/L (ref 133–144)
WBC # BLD AUTO: 6.9 10E3/UL (ref 4–11)

## 2022-02-11 PROCEDURE — 85027 COMPLETE CBC AUTOMATED: CPT | Performed by: PHYSICIAN ASSISTANT

## 2022-02-11 PROCEDURE — 93000 ELECTROCARDIOGRAM COMPLETE: CPT | Performed by: PHYSICIAN ASSISTANT

## 2022-02-11 PROCEDURE — 80048 BASIC METABOLIC PNL TOTAL CA: CPT | Performed by: PHYSICIAN ASSISTANT

## 2022-02-11 PROCEDURE — 36415 COLL VENOUS BLD VENIPUNCTURE: CPT | Performed by: PHYSICIAN ASSISTANT

## 2022-02-11 PROCEDURE — 99214 OFFICE O/P EST MOD 30 MIN: CPT | Performed by: PHYSICIAN ASSISTANT

## 2022-02-11 PROCEDURE — U0003 INFECTIOUS AGENT DETECTION BY NUCLEIC ACID (DNA OR RNA); SEVERE ACUTE RESPIRATORY SYNDROME CORONAVIRUS 2 (SARS-COV-2) (CORONAVIRUS DISEASE [COVID-19]), AMPLIFIED PROBE TECHNIQUE, MAKING USE OF HIGH THROUGHPUT TECHNOLOGIES AS DESCRIBED BY CMS-2020-01-R: HCPCS

## 2022-02-11 PROCEDURE — U0005 INFEC AGEN DETEC AMPLI PROBE: HCPCS

## 2022-02-11 ASSESSMENT — MIFFLIN-ST. JEOR: SCORE: 991.78

## 2022-02-11 NOTE — H&P (VIEW-ONLY)
[address] [address]  Phone: [phone]  Primary Provider: Vasquez Benoit  Pre-op Performing Provider: YONNY DOMÍNGUEZ      PREOPERATIVE EVALUATION:  Today's date: 2/11/2022    Shirley Hendricks is a 61 year old female who presents for a preoperative evaluation.    Surgical Information:  Surgery/Procedure: RIGHT SUPERFICIAL FEMORAL ARTERY GRAFT TO BELOW KNEE POPLITEAL BYPASS WITH CADAVERIC SUPERFICIAL FEMORAL ARTERY  Surgery Location: [facility]  Surgeon: Dr Lozano  Surgery Date: 02/15/2022  Time of Surgery: 730am  Where patient plans to recover: At home with family  Fax number for surgical facility: Note does not need to be faxed, will be available electronically in Epic.    Type of Anesthesia Anticipated: General    Assessment & Plan     The proposed surgical procedure is considered INTERMEDIATE risk.    Preop general physical exam    - EKG 12-lead complete w/read - Clinics    - Basic metabolic panel  (Ca, Cl, CO2, Creat, Gluc, K, Na, BUN)  - CBC with platelets    PAD (peripheral artery disease) (H)    - EKG 12-lead complete w/read - Clinics    - Basic metabolic panel  (Ca, Cl, CO2, Creat, Gluc, K, Na, BUN)  - CBC with platelets    Essential hypertension    - EKG 12-lead complete w/read - Clinics      - Basic metabolic panel  (Ca, Cl, CO2, Creat, Gluc, K, Na, BUN)  - CBC with platelets    Coronary artery disease involving native coronary artery of native heart without angina pectoris    - EKG 12-lead complete w/read - Clinics  -  - Basic metabolic panel  (Ca, Cl, CO2, Creat, Gluc, K, Na, BUN)  - CBC with platelets    Chronic obstructive pulmonary disease, unspecified COPD type (H)      Hyperlipidemia LDL goal <70             Risks and Recommendations:  The patient has the following additional risks and recommendations for perioperative complications:  Cardiovascular:  Hx of MI   Severe PAD  Pulmonary:    - Incentive spirometry  post-op   - Active nicotine user, advised smoking cessation    Medication Instructions:  Patient is to take all scheduled medications on the day of surgery EXCEPT for modifications listed below:   - aspirin: continue per vascular    - clopidrogel (Plavix), prasugrel (Effient), ticagrelor (Brilinta): No contraindication to stopping Plavix, HOLD 5-7 days before surgery.    - ACE/ARB: May be continued on the day of surgery.    - Beta Blockers: Continue taking on the day of surgery.   - Calcium Channel Blockers: May be continued on the day of surgery.   - Statins: Continue taking on the day of surgery.    - LABA, inhaled corticosteroid, long-acting anticholinergics: Continue without modification.    RECOMMENDATION:  APPROVAL GIVEN to proceed with proposed procedure, without further diagnostic evaluation.                      Subjective     HPI related to upcoming procedure: PAD - severe    Preop Questions 2/11/2022   1. Have you ever had a heart attack or stroke? YES - stable seeing cardiology    2. Have you ever had surgery on your heart or blood vessels, such as a stent placement, a coronary artery bypass, or surgery on an artery in your head, neck, heart, or legs? YES - stenting for PAD  Carotid endartectomy    3. Do you have chest pain with activity? No   4. Do you have a history of  heart failure? No   5. Do you currently have a cold, bronchitis or symptoms of other infection? No   6. Do you have a cough, shortness of breath, or wheezing? No   7. Do you or anyone in your family have previous history of blood clots? YES -    8. Do you or does anyone in your family have a serious bleeding problem such as prolonged bleeding following surgeries or cuts? No   9. Have you ever had problems with anemia or been told to take iron pills? No   10. Have you had any abnormal blood loss such as black, tarry or bloody stools, or abnormal vaginal bleeding? No   11. Have you ever had a blood transfusion? No   12. Are you willing to  have a blood transfusion if it is medically needed before, during, or after your surgery? Yes   13. Have you or any of your relatives ever had problems with anesthesia? No   14. Do you have sleep apnea, excessive snoring or daytime drowsiness? No   15. Do you have any artifical heart valves or other implanted medical devices like a pacemaker, defibrillator, or continuous glucose monitor? No   16. Do you have artificial joints? No   17. Are you allergic to latex? No   18. Is there any chance that you may be pregnant? -       Health Care Directive:  Patient does not have a Health Care Directive or Living Will:     Preoperative Review of :   reviewed - last prescription acutely 8/2021      Status of Chronic Conditions:  CAD - Patient has a longstanding history of moderate-severe CAD. Patient denies recent chest pain or NTG use, denies exercise induced dyspnea or PND. Last Stress test , EKG today .     COPD - Patient has a longstanding history of moderate-severe COPD . Patient has been doing well overall noting NO SYMPTOMS and continues on medication regimen consisting of inhalers as per med list without adverse reactions or side effects.    HYPERLIPIDEMIA - Patient has a long history of significant Hyperlipidemia requiring medication for treatment with recent good control. Patient reports no problems or side effects with the medication.     HYPERTENSION - Patient has longstanding history of HTN , currently denies any symptoms referable to elevated blood pressure. Specifically denies chest pain, palpitations, dyspnea, orthopnea, PND or peripheral edema. Blood pressure readings have been in normal range. Current medication regimen is as listed below. Patient denies any side effects of medication.       Review of Systems  CONSTITUTIONAL: NEGATIVE for fever, chills, change in weight  INTEGUMENTARY/SKIN: NEGATIVE for worrisome rashes, moles or lesions  EYES: NEGATIVE for vision changes or irritation  ENT/MOUTH:  NEGATIVE for ear, mouth and throat problems  RESP: NEGATIVE for significant cough or SOB  CV: NEGATIVE for chest pain, palpitations or peripheral edema  GI: NEGATIVE for nausea, abdominal pain, heartburn, or change in bowel habits  MUSCULOSKELETAL: NEGATIVE for significant arthralgias or myalgia  ENDOCRINE: NEGATIVE for temperature intolerance, skin/hair changes  HEME/ALLERGY/IMMUNE: NEGATIVE for bleeding problems  PSYCHIATRIC: NEGATIVE for changes in mood or affect  ROS otherwise negative    Patient Active Problem List    Diagnosis Date Noted     Health Care Home 06/06/2011     Priority: High     EMERGENCY CARE PLAN  Presenting Problem Signs and Symptoms Treatment Plan    Questions or conerns during clinic hours    I will call the clinic directly     Questions or conerns outside clinic hours    I will call the 24 hour nurse line at [phone]    Patient needs to schedule an appointment    I will call the 24 hour scheduling team at [phone] or clinic directly    Same day treatment     I will call the clinic first, nurse line if after hours, urgent care and express care if needed                            DX V65.8 REPLACED WITH [id number] HEALTH CARE HOME (04/08/2013)       SBO (small bowel obstruction) (H) 08/10/2021     Priority: Medium     Critical ischemia of extremity with history of revascularization of same extremity (H) 06/24/2021     Priority: Medium     Added automatically from request for surgery [id number]       Critical lower limb ischemia (H) 06/14/2021     Priority: Medium     Added automatically from request for surgery [id number]       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     Priority: Medium     Pathogenic PMP22 Duplication   [facility] #[id number]       Bilateral carpal tunnel syndrome      Priority: Medium     Thrombocytopenia (H)      Priority: Medium     History of colonic polyps 02/21/2020     Priority: Medium     SVT (supraventricular tachycardia) (H) 02/21/2020     Priority: Medium     Vitamin C  deficiency 08/12/2018     Priority: Medium     Anxiety 12/07/2017     Priority: Medium     Chronic allergic rhinitis due to animal hair and dander 07/20/2017     Priority: Medium     Tobacco use disorder 07/20/2017     Priority: Medium     Chronic obstructive pulmonary disease, unspecified COPD type (H) 07/20/2017     Priority: Medium     S/P carotid endarterectomy 07/07/2016     Priority: Medium     RIGHT SIDE       Coronary artery disease involving native coronary artery of native heart without angina pectoris 03/03/2016     Priority: Medium     Primary osteoarthritis involving multiple joints 03/03/2016     Priority: Medium     DDD (degenerative disc disease), lumbar 03/03/2016     Priority: Medium     Hyperlipidemia LDL goal <70 07/07/2014     Priority: Medium     PAD (peripheral artery disease) (H) 07/07/2014     Priority: Medium     Essential hypertension 07/07/2014     Priority: Medium     Problem list name updated by automated process. 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 Simons dermatology     Embolism and thrombosis of unspecified artery (H) 08/2000    Protein C,S, Leiden FV, Lupus Inhibitor Negative     Gastroesophageal reflux disease      Hyperlipidaemia      Hypertension      Lupus (H)     skin     Mild major depression (H) 11/07/2017     Myocardial infarction (H)     x3     Osteoarthrosis, unspecified whether generalized or localized, unspecified site      PAD (peripheral artery disease) (H)      Peripheral vascular disease, unspecified (H) 12/2000    s/p angioplasty with stent right  femoral a.; Right iliac and femoral a. clot     Post-menopausal      Reflux esophagitis 02/2004    EGD: esophagitis and medium HH     SBO (small bowel obstruction) (H) 08/10/2021     SVT (supraventricular tachycardia) (H)      Thrombocytopenia (H)      Uncomplicated asthma      Vitamin C deficiency 08/12/2018     Past Surgical History:   Procedure Laterality Date     ANGIOGRAM       ANGIOGRAM Right 6/23/2021    Procedure: RIGHT LOWER EXTREMITY ANGIOGRAM WITH LEFT BRACHIAL ARTERY CUTDOWN;  Surgeon: José Luis Hernandez MD;  Location:  OR     BYPASS GRAFT FEMOROPOPLITEAL Right 09/23/2020    Procedure: RIGHT FEMORAL GRAFT TO ABOVE-KNEE POPLITEAL BYPASS USING CADAVERIC SUPERFICIAL FEMORAL ARTERY;  Surgeon: Chadwick Lozano MD;  Location:  OR     BYPASS GRAFT INSITU FEMOROPOPLITEAL Right 7/7/2021    Procedure: CREATION RIGHT FEMORAL TO POPLITEAL ARTERIAL BYPASS USING INSITU VEIN GRAFT;  Surgeon: José Luis Hernandez MD;  Location:  OR     CARDIAC CATHERIZATION  09/03/2009    multivessel CAD without target lesions, med mgmt indicated, preserved ef     CARPAL TUNNEL RELEASE RT/LT Right 05/20/2021     ENDARTERECTOMY CAROTID Right 05/11/2016    Procedure: ENDARTERECTOMY CAROTID;  Surgeon: Chadwick Lozano MD;  Location:  OR     ENDARTERECTOMY CAROTID Left 06/08/2020    Procedure: LEFT CAROTID ENDARTERECTOMY with distal facal vein patch  and EEG;  Surgeon: Chadwick Lozano MD;  Location:  OR     GYN SURGERY  left tube    left salpingectomy     IR LOWER EXTREMITY ANGIOGRAM RIGHT  05/25/2021     IR OR ANGIOGRAM  6/23/2021     LAPAROSCOPIC CHOLECYSTECTOMY N/A 09/27/2017    Procedure: LAPAROSCOPIC CHOLECYSTECTOMY;  LAPAROSCOPIC CHOLECYSTECTOMY;  Surgeon: Jacoby Tapia MD;  Location: [facility]     LAPAROSCOPY DIAGNOSTIC (GENERAL) N/A 8/11/2021    Procedure: Exploratory laparoscopy,  laparoscopic lysis of adhesions, laparotomy;  Surgeon: Corina Ferreira MD;  Location:  OR     ORTHOPEDIC  SURGERY      left knee surgery     VASCULAR SURGERY  aoto bi fem  left fem-AK pop     [facility] FABRIC WRAPPING OF ABDOMINAL ANEURYSM       [facility] NONSPECIFIC PROCEDURE  12/2000    angioplasty with stent right fem. a.     [facility] NONSPECIFIC PROCEDURE  1987    sinus surgery     [facility] NONSPECIFIC PROCEDURE  09/02/2009    Emergent left groin exploration with oversewing of bleeding angiographic site. 2. Endarterectomy of common femoral-proximal superficial femoral artery with greater saphenous vein patch angioplasty.   a. [place] of accessory right greater saphenous vein.      [facility] NONSPECIFIC PROCEDURE  08/27/2009    occluded left common iliac and external iliac arteries were successfully revascularized with stenting to 8 and 7 mm      Current Outpatient Medications   Medication Sig Dispense Refill     acetaminophen (TYLENOL) 500 MG tablet Take 500-1,000 mg by mouth every 6 hours as needed for mild pain       amLODIPine (NORVASC) 5 MG tablet Take 1 tablet (5 mg) by mouth 2 times daily 60 tablet 1     aspirin (ASA) 81 MG chewable tablet Take 1 tablet (81 mg) by mouth daily 30 tablet 3     BREO ELLIPTA 200-25 MCG/INH Inhaler Inhale 1 puff into the lungs daily 120 each 3     Calcium Carb-Cholecalciferol (CALCIUM CARBONATE-VITAMIN D3) 600-400 MG-UNIT TABS TAKE ONE TABLET BY MOUTH TWICE DAILY 180 tablet 3     carvedilol (COREG) 6.25 MG tablet Take 6.25 mg by mouth 2 times daily (with meals)       clopidogrel (PLAVIX) 75 MG tablet Take 1 tablet (75 mg) by mouth daily 90 tablet 3     denosumab (PROLIA) 60 MG/ML SOLN injection Inject 1 mL (60 mg) Subcutaneous every 6 months INDICATION: TO TREAT OSTEOPOROSIS 1 Syringe 0     lisinopril (ZESTRIL) 20 MG tablet Take 20 mg by mouth 2 times daily        nicotine (NICODERM CQ) 14 MG/24HR 24 hr patch Place 1 patch onto the skin every 24 hours 30 patch 1     nicotine (NICODERM CQ) 21 MG/24HR 24 hr patch Place 1 patch onto the skin every 24 hours 30 patch 0     nicotine (NICODERM CQ) 7 MG/24HR 24 hr  "patch Place 1 patch onto the skin every 24 hours Start after completing 14 mg patches. 30 patch 1     nitroGLYcerin (NITROSTAT) 0.4 MG sublingual tablet Place 1 tablet (0.4 mg) under the tongue See Admin Instructions for chest pain 30 tablet 11     omeprazole (PRILOSEC) 20 MG DR capsule TAKE ONE CAPSULE BY MOUTH DAILY 90 capsule 1     rosuvastatin (CRESTOR) 40 MG tablet Take 1 tablet (40 mg) by mouth At Bedtime 90 tablet 3     sennosides (SENOKOT) 8.6 MG tablet Take 1 tablet by mouth 2 times daily as needed for constipation 30 tablet 0       Allergies   Allergen Reactions     Contrast Dye Anaphylaxis     RASH, FACIAL AND NECK SWELLING, SOB, WHEEZING     Pantoprazole      Protonix caused diffuse edema     Chantix [Varenicline]      Terrible dreams     Penicillins Itching        Social History     Tobacco Use     Smoking status: Current Every Day Smoker     Packs/day: 1.00     Years: 52.00     Pack years: 52.00     Types: Cigarettes     Last attempt to quit: 8/3/2021     Years since quittin.5     Smokeless tobacco: Never Used     Tobacco comment: 1/2 PPD   Substance Use Topics     Alcohol use: Not Currently     Comment: x1-2 yr       History   Drug Use No         Objective     BP (!) 140/60   Pulse 58   Temp 97.9  F (36.6  C) (Oral)   Resp 16   Ht 1.575 m (5' 2\")   Wt 48.4 kg (106 lb 9.6 oz)   SpO2 94%   BMI 19.50 kg/m      Physical Exam  GENERAL APPEARANCE: healthy, alert and no distress  EYES: Eyes grossly normal to inspection  HENT: ear canals and TM's normal and nose and mouth without ulcers or lesions  RESP: expiratory wheezes bibasilar and bilateral  ABDOMEN: soft, nontender, no HSM or masses and bowel sounds normal  SKIN: no suspicious lesions or rashes  NEURO: Normal strength and tone, sensory exam grossly normal, mentation intact and speech normal  PSYCH: mentation appears normal and affect normal/bright    Recent Labs   Lab Test 21  0650 08/15/21  0715 21  0844 20  0807 " 09/24/20  0739 09/23/20 2059 09/23/20  0844 06/09/20  0654 06/08/20  1302   HGB  --  9.4* 9.5*   < > 11.4*  --   --    < > 14.1    198 156   < > 114*   < >  --    < > 149*   INR  --   --   --   --  1.01  --   --   --   --    NA  --  135 132*   < > 133  --   --    < > 137   POTASSIUM  --  3.9 3.9   < > 4.1  --  4.1   < > 4.1   CR 0.62 0.64 0.63   < > 0.47*  --  0.47*   < > 0.56   A1C  --   --   --   --   --   --  5.4  --  5.5    < > = values in this interval not displayed.        Diagnostics:  Labs pending at this time.  Results will be reviewed when available.   EKG: sinus bradycardia, normal axis, normal intervals, no acute ST/T changes c/w ischemia, no LVH by voltage criteria, unchanged from previous tracings    Revised Cardiac Risk Index (RCRI):  The patient has the following serious cardiovascular risks for perioperative complications:   - Coronary Artery Disease (MI, positive stress test, angina, Qs on EKG) = 1 point     RCRI Interpretation: 1 point: Class II (low risk - 0.9% complication rate)           Signed Electronically by: Jaqueline Parra PA-C  Copy of this evaluation report is provided to requesting physician.

## 2022-02-11 NOTE — PATIENT INSTRUCTIONS
Anticoagulation Instruction: HOLD PLAVIX 5 DAYS PRIOR  Take all other morning medications with a sip of water    Preparing for Your Surgery  Getting started  A nurse will call you to review your health history and instructions. They will give you an arrival time based on your scheduled surgery time. Please be ready to share:    Your doctor's clinic name and phone number    Your medical, surgical and anesthesia history    A list of allergies and sensitivities    A list of medicines, including herbal treatments and over-the-counter drugs    Whether the patient has a legal guardian (ask how to send us the papers in advance)  Please tell us if you're pregnant--or if there's any chance you might be pregnant. Some surgeries may injure a fetus (unborn baby), so they require a pregnancy test. Surgeries that are safe for a fetus don't always need a test, and you can choose whether to have one.   If you have a child who's having surgery, please ask for a copy of Preparing for Your Child's Surgery.    Preparing for surgery    Within 30 days of surgery: Have a pre-op exam (sometimes called an H&P, or History and Physical). This can be done at a clinic or pre-operative center.  ? If you're having a , you may not need this exam. Talk to your care team.    At your pre-op exam, talk to your care team about all medicines you take. If you need to stop any medicines before surgery, ask when to start taking them again.  ? We do this for your safety. Many medicines can make you bleed too much during surgery. Some change how well surgery (anesthesia) drugs work.    Call your insurance company to let them know you're having surgery. (If you don't have insurance, call [phone].)    Call your clinic if there's any change in your health. This includes signs of a cold or flu (sore throat, runny nose, cough, rash, fever). It also includes a scrape or scratch near the surgery site.    If you have questions on the day of surgery,  call your hospital or surgery center.  COVID testing  You may need to be tested for COVID-19 before having surgery. If so, your surgical team will give you instructions for scheduling this test, separate from your preoperative history and physical.  Eating and drinking guidelines  For your safety: Unless your surgeon tells you otherwise, follow the guidelines below.    Eat and drink as usual until 8 hours before surgery. After that, no food or milk.    Drink clear liquids until 2 hours before surgery. These are liquids you can see through, like water, Gatorade and Propel Water. You may also have black coffee and tea (no cream or milk).    Nothing by mouth within 2 hours of surgery. This includes gum, candy and breath mints.    If you drink alcohol: Stop drinking it the night before surgery.    If your care team tells you to take medicine on the morning of surgery, it's okay to take it with a sip of water.  Preventing infection    Shower or bathe the night before and morning of your surgery. Follow the instructions your clinic gave you. (If no instructions, use regular soap.)    Don't shave or clip hair near your surgery site. We'll remove the hair if needed.    Don't smoke or vape the morning of surgery. You may chew nicotine gum up to 2 hours before surgery. A nicotine patch is okay.  ? Note: Some surgeries require you to completely quit smoking and nicotine. Check with your surgeon.    Your care team will make every effort to keep you safe from infection. We will:  ? Clean our hands often with soap and water (or an alcohol-based hand rub).  ? Clean the skin at your surgery site with a special soap that kills germs.  ? Give you a special gown to keep you warm. (Cold raises the risk of infection.)  ? Wear special hair covers, masks, gowns and gloves during surgery.  ? Give antibiotic medicine, if prescribed. Not all surgeries need antibiotics.  What to bring on the day of surgery    Photo ID and insurance  card    Copy of your health care directive, if you have one    Glasses and hearing aides (bring cases)  ? You can't wear contacts during surgery    Inhaler and eye drops, if you use them (tell us about these when you arrive)    CPAP machine or breathing device, if you use them    A few personal items, if spending the night    If you have . . .  ? A pacemaker, ICD (cardiac defibrillator) or other implant: Bring the ID card.  ? An implanted stimulator: Bring the remote control.  ? A legal guardian: Bring a copy of the certified (court-stamped) guardianship papers.  Please remove any jewelry, including body piercings. Leave jewelry and other valuables at home.  If you're going home the day of surgery    You must have a responsible adult drive you home. They should stay with you overnight as well.    If you don't have someone to stay with you, and you aren't safe to go home alone, we may keep you overnight. Insurance often won't pay for this.  After surgery  If it's hard to control your pain or you need more pain medicine, please call your surgeon's office.  Questions?   If you have any questions for your care team, list them here: _________________________________________________________________________________________________________________________________________________________________________ ____________________________________ ____________________________________ ____________________________________  For informational purposes only. Not to replace the advice of your health care provider. Copyright   2003, 2019 [facility]. All rights reserved. Clinically reviewed by Deloris Ordonez MD. [place] [id number] - REV 07/21.

## 2022-02-11 NOTE — PROGRESS NOTES
[address] [address]  Phone: [phone]  Primary Provider: Vasquez Benoit  Pre-op Performing Provider: YONNY DOMÍNGUEZ      PREOPERATIVE EVALUATION:  Today's date: 2/11/2022    Shirley Hendricks is a 61 year old female who presents for a preoperative evaluation.    Surgical Information:  Surgery/Procedure: RIGHT SUPERFICIAL FEMORAL ARTERY GRAFT TO BELOW KNEE POPLITEAL BYPASS WITH CADAVERIC SUPERFICIAL FEMORAL ARTERY  Surgery Location: [facility]  Surgeon: Dr Lozano  Surgery Date: 02/15/2022  Time of Surgery: 730am  Where patient plans to recover: At home with family  Fax number for surgical facility: Note does not need to be faxed, will be available electronically in Epic.    Type of Anesthesia Anticipated: General    Assessment & Plan     The proposed surgical procedure is considered INTERMEDIATE risk.    Preop general physical exam    - EKG 12-lead complete w/read - Clinics    - Basic metabolic panel  (Ca, Cl, CO2, Creat, Gluc, K, Na, BUN)  - CBC with platelets    PAD (peripheral artery disease) (H)    - EKG 12-lead complete w/read - Clinics    - Basic metabolic panel  (Ca, Cl, CO2, Creat, Gluc, K, Na, BUN)  - CBC with platelets    Essential hypertension    - EKG 12-lead complete w/read - Clinics      - Basic metabolic panel  (Ca, Cl, CO2, Creat, Gluc, K, Na, BUN)  - CBC with platelets    Coronary artery disease involving native coronary artery of native heart without angina pectoris    - EKG 12-lead complete w/read - Clinics  -  - Basic metabolic panel  (Ca, Cl, CO2, Creat, Gluc, K, Na, BUN)  - CBC with platelets    Chronic obstructive pulmonary disease, unspecified COPD type (H)      Hyperlipidemia LDL goal <70             Risks and Recommendations:  The patient has the following additional risks and recommendations for perioperative complications:  Cardiovascular:  Hx of MI   Severe PAD  Pulmonary:    - Incentive spirometry  post-op   - Active nicotine user, advised smoking cessation    Medication Instructions:  Patient is to take all scheduled medications on the day of surgery EXCEPT for modifications listed below:   - aspirin: continue per vascular    - clopidrogel (Plavix), prasugrel (Effient), ticagrelor (Brilinta): No contraindication to stopping Plavix, HOLD 5-7 days before surgery.    - ACE/ARB: May be continued on the day of surgery.    - Beta Blockers: Continue taking on the day of surgery.   - Calcium Channel Blockers: May be continued on the day of surgery.   - Statins: Continue taking on the day of surgery.    - LABA, inhaled corticosteroid, long-acting anticholinergics: Continue without modification.    RECOMMENDATION:  APPROVAL GIVEN to proceed with proposed procedure, without further diagnostic evaluation.                      Subjective     HPI related to upcoming procedure: PAD - severe    Preop Questions 2/11/2022   1. Have you ever had a heart attack or stroke? YES - stable seeing cardiology    2. Have you ever had surgery on your heart or blood vessels, such as a stent placement, a coronary artery bypass, or surgery on an artery in your head, neck, heart, or legs? YES - stenting for PAD  Carotid endartectomy    3. Do you have chest pain with activity? No   4. Do you have a history of  heart failure? No   5. Do you currently have a cold, bronchitis or symptoms of other infection? No   6. Do you have a cough, shortness of breath, or wheezing? No   7. Do you or anyone in your family have previous history of blood clots? YES -    8. Do you or does anyone in your family have a serious bleeding problem such as prolonged bleeding following surgeries or cuts? No   9. Have you ever had problems with anemia or been told to take iron pills? No   10. Have you had any abnormal blood loss such as black, tarry or bloody stools, or abnormal vaginal bleeding? No   11. Have you ever had a blood transfusion? No   12. Are you willing to  have a blood transfusion if it is medically needed before, during, or after your surgery? Yes   13. Have you or any of your relatives ever had problems with anesthesia? No   14. Do you have sleep apnea, excessive snoring or daytime drowsiness? No   15. Do you have any artifical heart valves or other implanted medical devices like a pacemaker, defibrillator, or continuous glucose monitor? No   16. Do you have artificial joints? No   17. Are you allergic to latex? No   18. Is there any chance that you may be pregnant? -       Health Care Directive:  Patient does not have a Health Care Directive or Living Will:     Preoperative Review of :   reviewed - last prescription acutely 8/2021      Status of Chronic Conditions:  CAD - Patient has a longstanding history of moderate-severe CAD. Patient denies recent chest pain or NTG use, denies exercise induced dyspnea or PND. Last Stress test , EKG today .     COPD - Patient has a longstanding history of moderate-severe COPD . Patient has been doing well overall noting NO SYMPTOMS and continues on medication regimen consisting of inhalers as per med list without adverse reactions or side effects.    HYPERLIPIDEMIA - Patient has a long history of significant Hyperlipidemia requiring medication for treatment with recent good control. Patient reports no problems or side effects with the medication.     HYPERTENSION - Patient has longstanding history of HTN , currently denies any symptoms referable to elevated blood pressure. Specifically denies chest pain, palpitations, dyspnea, orthopnea, PND or peripheral edema. Blood pressure readings have been in normal range. Current medication regimen is as listed below. Patient denies any side effects of medication.       Review of Systems  CONSTITUTIONAL: NEGATIVE for fever, chills, change in weight  INTEGUMENTARY/SKIN: NEGATIVE for worrisome rashes, moles or lesions  EYES: NEGATIVE for vision changes or irritation  ENT/MOUTH:  NEGATIVE for ear, mouth and throat problems  RESP: NEGATIVE for significant cough or SOB  CV: NEGATIVE for chest pain, palpitations or peripheral edema  GI: NEGATIVE for nausea, abdominal pain, heartburn, or change in bowel habits  MUSCULOSKELETAL: NEGATIVE for significant arthralgias or myalgia  ENDOCRINE: NEGATIVE for temperature intolerance, skin/hair changes  HEME/ALLERGY/IMMUNE: NEGATIVE for bleeding problems  PSYCHIATRIC: NEGATIVE for changes in mood or affect  ROS otherwise negative    Patient Active Problem List    Diagnosis Date Noted     Health Care Home 06/06/2011     Priority: High     EMERGENCY CARE PLAN  Presenting Problem Signs and Symptoms Treatment Plan    Questions or conerns during clinic hours    I will call the clinic directly     Questions or conerns outside clinic hours    I will call the 24 hour nurse line at [phone]    Patient needs to schedule an appointment    I will call the 24 hour scheduling team at [phone] or clinic directly    Same day treatment     I will call the clinic first, nurse line if after hours, urgent care and express care if needed                            DX V65.8 REPLACED WITH [id number] HEALTH CARE HOME (04/08/2013)       SBO (small bowel obstruction) (H) 08/10/2021     Priority: Medium     Critical ischemia of extremity with history of revascularization of same extremity (H) 06/24/2021     Priority: Medium     Added automatically from request for surgery [id number]       Critical lower limb ischemia (H) 06/14/2021     Priority: Medium     Added automatically from request for surgery [id number]       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     Priority: Medium     Pathogenic PMP22 Duplication   [facility] #[id number]       Bilateral carpal tunnel syndrome      Priority: Medium     Thrombocytopenia (H)      Priority: Medium     History of colonic polyps 02/21/2020     Priority: Medium     SVT (supraventricular tachycardia) (H) 02/21/2020     Priority: Medium     Vitamin C  deficiency 08/12/2018     Priority: Medium     Anxiety 12/07/2017     Priority: Medium     Chronic allergic rhinitis due to animal hair and dander 07/20/2017     Priority: Medium     Tobacco use disorder 07/20/2017     Priority: Medium     Chronic obstructive pulmonary disease, unspecified COPD type (H) 07/20/2017     Priority: Medium     S/P carotid endarterectomy 07/07/2016     Priority: Medium     RIGHT SIDE       Coronary artery disease involving native coronary artery of native heart without angina pectoris 03/03/2016     Priority: Medium     Primary osteoarthritis involving multiple joints 03/03/2016     Priority: Medium     DDD (degenerative disc disease), lumbar 03/03/2016     Priority: Medium     Hyperlipidemia LDL goal <70 07/07/2014     Priority: Medium     PAD (peripheral artery disease) (H) 07/07/2014     Priority: Medium     Essential hypertension 07/07/2014     Priority: Medium     Problem list name updated by automated process. 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 Simons dermatology     Embolism and thrombosis of unspecified artery (H) 08/2000    Protein C,S, Leiden FV, Lupus Inhibitor Negative     Gastroesophageal reflux disease      Hyperlipidaemia      Hypertension      Lupus (H)     skin     Mild major depression (H) 11/07/2017     Myocardial infarction (H)     x3     Osteoarthrosis, unspecified whether generalized or localized, unspecified site      PAD (peripheral artery disease) (H)      Peripheral vascular disease, unspecified (H) 12/2000    s/p angioplasty with stent right  femoral a.; Right iliac and femoral a. clot     Post-menopausal      Reflux esophagitis 02/2004    EGD: esophagitis and medium HH     SBO (small bowel obstruction) (H) 08/10/2021     SVT (supraventricular tachycardia) (H)      Thrombocytopenia (H)      Uncomplicated asthma      Vitamin C deficiency 08/12/2018     Past Surgical History:   Procedure Laterality Date     ANGIOGRAM       ANGIOGRAM Right 6/23/2021    Procedure: RIGHT LOWER EXTREMITY ANGIOGRAM WITH LEFT BRACHIAL ARTERY CUTDOWN;  Surgeon: José Luis Hernandez MD;  Location:  OR     BYPASS GRAFT FEMOROPOPLITEAL Right 09/23/2020    Procedure: RIGHT FEMORAL GRAFT TO ABOVE-KNEE POPLITEAL BYPASS USING CADAVERIC SUPERFICIAL FEMORAL ARTERY;  Surgeon: Chadwick Lozano MD;  Location:  OR     BYPASS GRAFT INSITU FEMOROPOPLITEAL Right 7/7/2021    Procedure: CREATION RIGHT FEMORAL TO POPLITEAL ARTERIAL BYPASS USING INSITU VEIN GRAFT;  Surgeon: José Luis Hernandez MD;  Location:  OR     CARDIAC CATHERIZATION  09/03/2009    multivessel CAD without target lesions, med mgmt indicated, preserved ef     CARPAL TUNNEL RELEASE RT/LT Right 05/20/2021     ENDARTERECTOMY CAROTID Right 05/11/2016    Procedure: ENDARTERECTOMY CAROTID;  Surgeon: Chadwick Lozano MD;  Location:  OR     ENDARTERECTOMY CAROTID Left 06/08/2020    Procedure: LEFT CAROTID ENDARTERECTOMY with distal facal vein patch  and EEG;  Surgeon: Chadwick Lozano MD;  Location:  OR     GYN SURGERY  left tube    left salpingectomy     IR LOWER EXTREMITY ANGIOGRAM RIGHT  05/25/2021     IR OR ANGIOGRAM  6/23/2021     LAPAROSCOPIC CHOLECYSTECTOMY N/A 09/27/2017    Procedure: LAPAROSCOPIC CHOLECYSTECTOMY;  LAPAROSCOPIC CHOLECYSTECTOMY;  Surgeon: Jacoby Tapia MD;  Location: [facility]     LAPAROSCOPY DIAGNOSTIC (GENERAL) N/A 8/11/2021    Procedure: Exploratory laparoscopy,  laparoscopic lysis of adhesions, laparotomy;  Surgeon: Corina Ferreira MD;  Location:  OR     ORTHOPEDIC  SURGERY      left knee surgery     VASCULAR SURGERY  aoto bi fem  left fem-AK pop     [facility] FABRIC WRAPPING OF ABDOMINAL ANEURYSM       [facility] NONSPECIFIC PROCEDURE  12/2000    angioplasty with stent right fem. a.     [facility] NONSPECIFIC PROCEDURE  1987    sinus surgery     [facility] NONSPECIFIC PROCEDURE  09/02/2009    Emergent left groin exploration with oversewing of bleeding angiographic site. 2. Endarterectomy of common femoral-proximal superficial femoral artery with greater saphenous vein patch angioplasty.   a. Warren of accessory right greater saphenous vein.      [facility] NONSPECIFIC PROCEDURE  08/27/2009    occluded left common iliac and external iliac arteries were successfully revascularized with stenting to 8 and 7 mm      Current Outpatient Medications   Medication Sig Dispense Refill     acetaminophen (TYLENOL) 500 MG tablet Take 500-1,000 mg by mouth every 6 hours as needed for mild pain       amLODIPine (NORVASC) 5 MG tablet Take 1 tablet (5 mg) by mouth 2 times daily 60 tablet 1     aspirin (ASA) 81 MG chewable tablet Take 1 tablet (81 mg) by mouth daily 30 tablet 3     BREO ELLIPTA 200-25 MCG/INH Inhaler Inhale 1 puff into the lungs daily 120 each 3     Calcium Carb-Cholecalciferol (CALCIUM CARBONATE-VITAMIN D3) 600-400 MG-UNIT TABS TAKE ONE TABLET BY MOUTH TWICE DAILY 180 tablet 3     carvedilol (COREG) 6.25 MG tablet Take 6.25 mg by mouth 2 times daily (with meals)       clopidogrel (PLAVIX) 75 MG tablet Take 1 tablet (75 mg) by mouth daily 90 tablet 3     denosumab (PROLIA) 60 MG/ML SOLN injection Inject 1 mL (60 mg) Subcutaneous every 6 months INDICATION: TO TREAT OSTEOPOROSIS 1 Syringe 0     lisinopril (ZESTRIL) 20 MG tablet Take 20 mg by mouth 2 times daily        nicotine (NICODERM CQ) 14 MG/24HR 24 hr patch Place 1 patch onto the skin every 24 hours 30 patch 1     nicotine (NICODERM CQ) 21 MG/24HR 24 hr patch Place 1 patch onto the skin every 24 hours 30 patch 0     nicotine (NICODERM CQ) 7 MG/24HR 24 hr  "patch Place 1 patch onto the skin every 24 hours Start after completing 14 mg patches. 30 patch 1     nitroGLYcerin (NITROSTAT) 0.4 MG sublingual tablet Place 1 tablet (0.4 mg) under the tongue See Admin Instructions for chest pain 30 tablet 11     omeprazole (PRILOSEC) 20 MG DR capsule TAKE ONE CAPSULE BY MOUTH DAILY 90 capsule 1     rosuvastatin (CRESTOR) 40 MG tablet Take 1 tablet (40 mg) by mouth At Bedtime 90 tablet 3     sennosides (SENOKOT) 8.6 MG tablet Take 1 tablet by mouth 2 times daily as needed for constipation 30 tablet 0       Allergies   Allergen Reactions     Contrast Dye Anaphylaxis     RASH, FACIAL AND NECK SWELLING, SOB, WHEEZING     Pantoprazole      Protonix caused diffuse edema     Chantix [Varenicline]      Terrible dreams     Penicillins Itching        Social History     Tobacco Use     Smoking status: Current Every Day Smoker     Packs/day: 1.00     Years: 52.00     Pack years: 52.00     Types: Cigarettes     Last attempt to quit: 8/3/2021     Years since quittin.5     Smokeless tobacco: Never Used     Tobacco comment: 1/2 PPD   Substance Use Topics     Alcohol use: Not Currently     Comment: x1-2 yr       History   Drug Use No         Objective     BP (!) 140/60   Pulse 58   Temp 97.9  F (36.6  C) (Oral)   Resp 16   Ht 1.575 m (5' 2\")   Wt 48.4 kg (106 lb 9.6 oz)   SpO2 94%   BMI 19.50 kg/m      Physical Exam  GENERAL APPEARANCE: healthy, alert and no distress  EYES: Eyes grossly normal to inspection  HENT: ear canals and TM's normal and nose and mouth without ulcers or lesions  RESP: expiratory wheezes bibasilar and bilateral  ABDOMEN: soft, nontender, no HSM or masses and bowel sounds normal  SKIN: no suspicious lesions or rashes  NEURO: Normal strength and tone, sensory exam grossly normal, mentation intact and speech normal  PSYCH: mentation appears normal and affect normal/bright    Recent Labs   Lab Test 21  0650 08/15/21  0715 21  0844 20  0807 " 09/24/20  0739 09/23/20 2059 09/23/20  0844 06/09/20  0654 06/08/20  1302   HGB  --  9.4* 9.5*   < > 11.4*  --   --    < > 14.1    198 156   < > 114*   < >  --    < > 149*   INR  --   --   --   --  1.01  --   --   --   --    NA  --  135 132*   < > 133  --   --    < > 137   POTASSIUM  --  3.9 3.9   < > 4.1  --  4.1   < > 4.1   CR 0.62 0.64 0.63   < > 0.47*  --  0.47*   < > 0.56   A1C  --   --   --   --   --   --  5.4  --  5.5    < > = values in this interval not displayed.        Diagnostics:  Labs pending at this time.  Results will be reviewed when available.   EKG: sinus bradycardia, normal axis, normal intervals, no acute ST/T changes c/w ischemia, no LVH by voltage criteria, unchanged from previous tracings    Revised Cardiac Risk Index (RCRI):  The patient has the following serious cardiovascular risks for perioperative complications:   - Coronary Artery Disease (MI, positive stress test, angina, Qs on EKG) = 1 point     RCRI Interpretation: 1 point: Class II (low risk - 0.9% complication rate)           Signed Electronically by: Jaqueline Parra PA-C  Copy of this evaluation report is provided to requesting physician.

## 2022-02-12 LAB — SARS-COV-2 RNA RESP QL NAA+PROBE: NEGATIVE

## 2022-02-14 ENCOUNTER — TELEPHONE (OUTPATIENT)
Dept: OTHER | Facility: CLINIC | Age: 62
End: 2022-02-14
Payer: COMMERCIAL

## 2022-02-14 ENCOUNTER — ANESTHESIA EVENT (OUTPATIENT)
Dept: SURGERY | Facility: CLINIC | Age: 62
DRG: 253 | End: 2022-02-14
Payer: COMMERCIAL

## 2022-02-14 ASSESSMENT — COPD QUESTIONNAIRES: COPD: 1

## 2022-02-14 NOTE — TELEPHONE ENCOUNTER
Type of surgery: RIGHT SFA GRAFT TO BELOW KNEE POPLITEAL BYPASS WITH CADAVERIC SFA  Location of surgery: [facility]  Date and time of surgery: 02/15/2022 @ 7:30AM  Surgeon: DR. OSMAN URIOSTEGUI  Pre-Op Appt Date: PT SCHEDULED WITH DR. PANDA  Post-Op Appt Date: 03/03/2022 @ 9:15AM   Packet sent out: GIVEN TO PT IN CLINI CON 02/10/22  Pre-cert/Authorization completed:  Yes  Date: 02/14/22

## 2022-02-15 ENCOUNTER — HOSPITAL ENCOUNTER (INPATIENT)
Facility: CLINIC | Age: 62
LOS: 2 days | Discharge: HOME | DRG: 253 | End: 2022-02-17
Attending: SURGERY | Admitting: SURGERY
Payer: COMMERCIAL

## 2022-02-15 ENCOUNTER — ANESTHESIA (OUTPATIENT)
Dept: SURGERY | Facility: CLINIC | Age: 62
DRG: 253 | End: 2022-02-15
Payer: COMMERCIAL

## 2022-02-15 ENCOUNTER — APPOINTMENT (OUTPATIENT)
Dept: SURGERY | Facility: PHYSICIAN GROUP | Age: 62
End: 2022-02-15
Payer: COMMERCIAL

## 2022-02-15 DIAGNOSIS — D62 ANEMIA DUE TO BLOOD LOSS, ACUTE: ICD-10-CM

## 2022-02-15 DIAGNOSIS — I70.229 CRITICAL ISCHEMIA OF EXTREMITY WITH HISTORY OF REVASCULARIZATION OF SAME EXTREMITY (H): Primary | ICD-10-CM

## 2022-02-15 DIAGNOSIS — I70.229 CRITICAL LOWER LIMB ISCHEMIA (H): ICD-10-CM

## 2022-02-15 DIAGNOSIS — Z98.890 CRITICAL ISCHEMIA OF EXTREMITY WITH HISTORY OF REVASCULARIZATION OF SAME EXTREMITY (H): Primary | ICD-10-CM

## 2022-02-15 LAB
ABO/RH(D): NORMAL
ANTIBODY SCREEN: NEGATIVE
CHOLEST SERPL-MCNC: 124 MG/DL
HBA1C MFR BLD: 5.3 % (ref 0–5.6)
HDLC SERPL-MCNC: 48 MG/DL
LDLC SERPL CALC-MCNC: 61 MG/DL
NONHDLC SERPL-MCNC: 76 MG/DL
POTASSIUM BLD-SCNC: 4 MMOL/L (ref 3.4–5.3)
SODIUM SERPL-SCNC: 132 MMOL/L (ref 133–144)
SPECIMEN EXPIRATION DATE: NORMAL
TRIGL SERPL-MCNC: 76 MG/DL

## 2022-02-15 PROCEDURE — 370N000017 HC ANESTHESIA TECHNICAL FEE, PER MIN: Performed by: SURGERY

## 2022-02-15 PROCEDURE — 36415 COLL VENOUS BLD VENIPUNCTURE: CPT | Performed by: SURGERY

## 2022-02-15 PROCEDURE — 258N000003 HC RX IP 258 OP 636: Performed by: SURGERY

## 2022-02-15 PROCEDURE — 86901 BLOOD TYPING SEROLOGIC RH(D): CPT | Performed by: ANESTHESIOLOGY

## 2022-02-15 PROCEDURE — 250N000011 HC RX IP 250 OP 636: Performed by: SURGERY

## 2022-02-15 PROCEDURE — 80061 LIPID PANEL: CPT | Performed by: SURGERY

## 2022-02-15 PROCEDURE — 250N000009 HC RX 250

## 2022-02-15 PROCEDURE — 84295 ASSAY OF SERUM SODIUM: CPT | Performed by: ANESTHESIOLOGY

## 2022-02-15 PROCEDURE — 360N000075 HC SURGERY LEVEL 2, PER MIN: Performed by: SURGERY

## 2022-02-15 PROCEDURE — 35876 REMOVAL OF CLOT IN GRAFT: CPT | Mod: 22 | Performed by: SURGERY

## 2022-02-15 PROCEDURE — 710N000009 HC RECOVERY PHASE 1, LEVEL 1, PER MIN: Performed by: SURGERY

## 2022-02-15 PROCEDURE — 258N000003 HC RX IP 258 OP 636

## 2022-02-15 PROCEDURE — 120N000001 HC R&B MED SURG/OB

## 2022-02-15 PROCEDURE — 999N000141 HC STATISTIC PRE-PROCEDURE NURSING ASSESSMENT: Performed by: SURGERY

## 2022-02-15 PROCEDURE — 272N000001 HC OR GENERAL SUPPLY STERILE: Performed by: SURGERY

## 2022-02-15 PROCEDURE — 86850 RBC ANTIBODY SCREEN: CPT | Performed by: ANESTHESIOLOGY

## 2022-02-15 PROCEDURE — 84132 ASSAY OF SERUM POTASSIUM: CPT | Performed by: ANESTHESIOLOGY

## 2022-02-15 PROCEDURE — 04WY0KZ REVISION OF NONAUTOLOGOUS TISSUE SUBSTITUTE IN LOWER ARTERY, OPEN APPROACH: ICD-10-PCS | Performed by: SURGERY

## 2022-02-15 PROCEDURE — 258N000003 HC RX IP 258 OP 636: Performed by: ANESTHESIOLOGY

## 2022-02-15 PROCEDURE — 250N000013 HC RX MED GY IP 250 OP 250 PS 637: Performed by: SURGERY

## 2022-02-15 PROCEDURE — 250N000011 HC RX IP 250 OP 636

## 2022-02-15 PROCEDURE — C1762 CONN TISS, HUMAN(INC FASCIA): HCPCS | Performed by: SURGERY

## 2022-02-15 PROCEDURE — 250N000009 HC RX 250: Performed by: SURGERY

## 2022-02-15 PROCEDURE — 250N000025 HC SEVOFLURANE, PER MIN: Performed by: SURGERY

## 2022-02-15 PROCEDURE — 83036 HEMOGLOBIN GLYCOSYLATED A1C: CPT | Performed by: SURGERY

## 2022-02-15 DEVICE — IMPLANTABLE DEVICE: Type: IMPLANTABLE DEVICE | Site: ILIAC/FEMORALS | Status: FUNCTIONAL

## 2022-02-15 RX ORDER — ASPIRIN 81 MG/1
81 TABLET ORAL DAILY
Status: DISCONTINUED | OUTPATIENT
Start: 2022-02-16 | End: 2022-02-17 | Stop reason: HOSPADM

## 2022-02-15 RX ORDER — ROSUVASTATIN CALCIUM 20 MG/1
40 TABLET, COATED ORAL AT BEDTIME
Status: DISCONTINUED | OUTPATIENT
Start: 2022-02-15 | End: 2022-02-17 | Stop reason: HOSPADM

## 2022-02-15 RX ORDER — FENTANYL CITRATE 0.05 MG/ML
25 INJECTION, SOLUTION INTRAMUSCULAR; INTRAVENOUS EVERY 5 MIN PRN
Status: DISCONTINUED | OUTPATIENT
Start: 2022-02-15 | End: 2022-02-15 | Stop reason: HOSPADM

## 2022-02-15 RX ORDER — BUPIVACAINE HYDROCHLORIDE 5 MG/ML
INJECTION, SOLUTION PERINEURAL PRN
Status: DISCONTINUED | OUTPATIENT
Start: 2022-02-15 | End: 2022-02-15 | Stop reason: HOSPADM

## 2022-02-15 RX ORDER — HYDROMORPHONE HCL IN WATER/PF 6 MG/30 ML
0.2 PATIENT CONTROLLED ANALGESIA SYRINGE INTRAVENOUS
Status: DISCONTINUED | OUTPATIENT
Start: 2022-02-15 | End: 2022-02-17 | Stop reason: HOSPADM

## 2022-02-15 RX ORDER — BISACODYL 10 MG
10 SUPPOSITORY, RECTAL RECTAL DAILY PRN
Status: DISCONTINUED | OUTPATIENT
Start: 2022-02-15 | End: 2022-02-17 | Stop reason: HOSPADM

## 2022-02-15 RX ORDER — NALOXONE HYDROCHLORIDE 0.4 MG/ML
0.2 INJECTION, SOLUTION INTRAMUSCULAR; INTRAVENOUS; SUBCUTANEOUS
Status: DISCONTINUED | OUTPATIENT
Start: 2022-02-15 | End: 2022-02-17 | Stop reason: HOSPADM

## 2022-02-15 RX ORDER — BUPIVACAINE HYDROCHLORIDE 5 MG/ML
INJECTION, SOLUTION EPIDURAL; INTRACAUDAL
Status: DISCONTINUED
Start: 2022-02-15 | End: 2022-02-15 | Stop reason: HOSPADM

## 2022-02-15 RX ORDER — ACETAMINOPHEN 325 MG/1
650 TABLET ORAL EVERY 4 HOURS PRN
Status: DISCONTINUED | OUTPATIENT
Start: 2022-02-18 | End: 2022-02-17 | Stop reason: HOSPADM

## 2022-02-15 RX ORDER — POLYETHYLENE GLYCOL 3350 17 G/17G
17 POWDER, FOR SOLUTION ORAL DAILY
Status: DISCONTINUED | OUTPATIENT
Start: 2022-02-16 | End: 2022-02-17 | Stop reason: HOSPADM

## 2022-02-15 RX ORDER — LIDOCAINE HYDROCHLORIDE 20 MG/ML
INJECTION, SOLUTION INFILTRATION; PERINEURAL PRN
Status: DISCONTINUED | OUTPATIENT
Start: 2022-02-15 | End: 2022-02-15

## 2022-02-15 RX ORDER — NITROGLYCERIN 0.4 MG/1
0.4 TABLET SUBLINGUAL EVERY 5 MIN PRN
Status: DISCONTINUED | OUTPATIENT
Start: 2022-02-15 | End: 2022-02-17 | Stop reason: HOSPADM

## 2022-02-15 RX ORDER — NEOSTIGMINE METHYLSULFATE 1 MG/ML
VIAL (ML) INJECTION PRN
Status: DISCONTINUED | OUTPATIENT
Start: 2022-02-15 | End: 2022-02-15

## 2022-02-15 RX ORDER — ONDANSETRON 4 MG/1
4 TABLET, ORALLY DISINTEGRATING ORAL EVERY 30 MIN PRN
Status: DISCONTINUED | OUTPATIENT
Start: 2022-02-15 | End: 2022-02-15 | Stop reason: HOSPADM

## 2022-02-15 RX ORDER — GABAPENTIN 100 MG/1
100 CAPSULE ORAL 3 TIMES DAILY
Status: DISCONTINUED | OUTPATIENT
Start: 2022-02-15 | End: 2022-02-17 | Stop reason: HOSPADM

## 2022-02-15 RX ORDER — PROCHLORPERAZINE MALEATE 10 MG
10 TABLET ORAL EVERY 6 HOURS PRN
Status: DISCONTINUED | OUTPATIENT
Start: 2022-02-15 | End: 2022-02-17 | Stop reason: HOSPADM

## 2022-02-15 RX ORDER — CLOPIDOGREL BISULFATE 75 MG/1
75 TABLET ORAL DAILY
Status: DISCONTINUED | OUTPATIENT
Start: 2022-02-15 | End: 2022-02-17 | Stop reason: HOSPADM

## 2022-02-15 RX ORDER — OXYCODONE HYDROCHLORIDE 5 MG/1
10 TABLET ORAL EVERY 4 HOURS PRN
Status: DISCONTINUED | OUTPATIENT
Start: 2022-02-15 | End: 2022-02-17 | Stop reason: HOSPADM

## 2022-02-15 RX ORDER — HYDROMORPHONE HCL IN WATER/PF 6 MG/30 ML
0.4 PATIENT CONTROLLED ANALGESIA SYRINGE INTRAVENOUS
Status: DISCONTINUED | OUTPATIENT
Start: 2022-02-15 | End: 2022-02-17 | Stop reason: HOSPADM

## 2022-02-15 RX ORDER — OXYCODONE HYDROCHLORIDE 5 MG/1
5 TABLET ORAL EVERY 4 HOURS PRN
Status: DISCONTINUED | OUTPATIENT
Start: 2022-02-15 | End: 2022-02-17 | Stop reason: HOSPADM

## 2022-02-15 RX ORDER — PROPOFOL 10 MG/ML
INJECTION, EMULSION INTRAVENOUS PRN
Status: DISCONTINUED | OUTPATIENT
Start: 2022-02-15 | End: 2022-02-15

## 2022-02-15 RX ORDER — HYDROMORPHONE HCL IN WATER/PF 6 MG/30 ML
0.2 PATIENT CONTROLLED ANALGESIA SYRINGE INTRAVENOUS EVERY 5 MIN PRN
Status: DISCONTINUED | OUTPATIENT
Start: 2022-02-15 | End: 2022-02-15 | Stop reason: HOSPADM

## 2022-02-15 RX ORDER — HEPARIN SODIUM 1000 [USP'U]/ML
INJECTION, SOLUTION INTRAVENOUS; SUBCUTANEOUS
Status: DISCONTINUED
Start: 2022-02-15 | End: 2022-02-15 | Stop reason: HOSPADM

## 2022-02-15 RX ORDER — LISINOPRIL 20 MG/1
20 TABLET ORAL 2 TIMES DAILY
Status: DISCONTINUED | OUTPATIENT
Start: 2022-02-15 | End: 2022-02-17 | Stop reason: HOSPADM

## 2022-02-15 RX ORDER — ONDANSETRON 4 MG/1
4 TABLET, ORALLY DISINTEGRATING ORAL EVERY 6 HOURS PRN
Status: DISCONTINUED | OUTPATIENT
Start: 2022-02-15 | End: 2022-02-17 | Stop reason: HOSPADM

## 2022-02-15 RX ORDER — EPHEDRINE SULFATE 50 MG/ML
INJECTION, SOLUTION INTRAMUSCULAR; INTRAVENOUS; SUBCUTANEOUS PRN
Status: DISCONTINUED | OUTPATIENT
Start: 2022-02-15 | End: 2022-02-15

## 2022-02-15 RX ORDER — KETOROLAC TROMETHAMINE 15 MG/ML
15 INJECTION, SOLUTION INTRAMUSCULAR; INTRAVENOUS EVERY 6 HOURS
Status: DISCONTINUED | OUTPATIENT
Start: 2022-02-15 | End: 2022-02-16

## 2022-02-15 RX ORDER — HEPARIN SODIUM 1000 [USP'U]/ML
INJECTION, SOLUTION INTRAVENOUS; SUBCUTANEOUS PRN
Status: DISCONTINUED | OUTPATIENT
Start: 2022-02-15 | End: 2022-02-15

## 2022-02-15 RX ORDER — CARVEDILOL 6.25 MG/1
6.25 TABLET ORAL 2 TIMES DAILY WITH MEALS
Status: DISCONTINUED | OUTPATIENT
Start: 2022-02-15 | End: 2022-02-17 | Stop reason: HOSPADM

## 2022-02-15 RX ORDER — ASPIRIN 81 MG/1
81 TABLET, CHEWABLE ORAL DAILY
Status: DISCONTINUED | OUTPATIENT
Start: 2022-02-15 | End: 2022-02-15

## 2022-02-15 RX ORDER — FENTANYL CITRATE 50 UG/ML
INJECTION, SOLUTION INTRAMUSCULAR; INTRAVENOUS PRN
Status: DISCONTINUED | OUTPATIENT
Start: 2022-02-15 | End: 2022-02-15

## 2022-02-15 RX ORDER — AMLODIPINE BESYLATE 5 MG/1
5 TABLET ORAL 2 TIMES DAILY
Status: DISCONTINUED | OUTPATIENT
Start: 2022-02-15 | End: 2022-02-17 | Stop reason: HOSPADM

## 2022-02-15 RX ORDER — GLYCOPYRROLATE 0.2 MG/ML
INJECTION, SOLUTION INTRAMUSCULAR; INTRAVENOUS PRN
Status: DISCONTINUED | OUTPATIENT
Start: 2022-02-15 | End: 2022-02-15

## 2022-02-15 RX ORDER — NALOXONE HYDROCHLORIDE 0.4 MG/ML
0.4 INJECTION, SOLUTION INTRAMUSCULAR; INTRAVENOUS; SUBCUTANEOUS
Status: DISCONTINUED | OUTPATIENT
Start: 2022-02-15 | End: 2022-02-17 | Stop reason: HOSPADM

## 2022-02-15 RX ORDER — ACETAMINOPHEN 325 MG/1
975 TABLET ORAL EVERY 8 HOURS
Status: DISCONTINUED | OUTPATIENT
Start: 2022-02-15 | End: 2022-02-17 | Stop reason: HOSPADM

## 2022-02-15 RX ORDER — SODIUM CHLORIDE, SODIUM LACTATE, POTASSIUM CHLORIDE, CALCIUM CHLORIDE 600; 310; 30; 20 MG/100ML; MG/100ML; MG/100ML; MG/100ML
INJECTION, SOLUTION INTRAVENOUS CONTINUOUS
Status: DISCONTINUED | OUTPATIENT
Start: 2022-02-15 | End: 2022-02-15 | Stop reason: HOSPADM

## 2022-02-15 RX ORDER — AMOXICILLIN 250 MG
1 CAPSULE ORAL 2 TIMES DAILY
Status: DISCONTINUED | OUTPATIENT
Start: 2022-02-15 | End: 2022-02-17 | Stop reason: HOSPADM

## 2022-02-15 RX ORDER — PANTOPRAZOLE SODIUM 40 MG/1
40 TABLET, DELAYED RELEASE ORAL DAILY
Status: DISCONTINUED | OUTPATIENT
Start: 2022-02-16 | End: 2022-02-17 | Stop reason: HOSPADM

## 2022-02-15 RX ORDER — LIDOCAINE 40 MG/G
CREAM TOPICAL
Status: DISCONTINUED | OUTPATIENT
Start: 2022-02-15 | End: 2022-02-17 | Stop reason: HOSPADM

## 2022-02-15 RX ORDER — SODIUM CHLORIDE 9 MG/ML
INJECTION, SOLUTION INTRAVENOUS CONTINUOUS
Status: DISCONTINUED | OUTPATIENT
Start: 2022-02-15 | End: 2022-02-17 | Stop reason: HOSPADM

## 2022-02-15 RX ORDER — DEXTROSE, SODIUM CHLORIDE, SODIUM LACTATE, POTASSIUM CHLORIDE, AND CALCIUM CHLORIDE 5; .6; .31; .03; .02 G/100ML; G/100ML; G/100ML; G/100ML; G/100ML
INJECTION, SOLUTION INTRAVENOUS CONTINUOUS PRN
Status: COMPLETED | OUTPATIENT
Start: 2022-02-15 | End: 2022-02-15

## 2022-02-15 RX ORDER — ONDANSETRON 2 MG/ML
INJECTION INTRAMUSCULAR; INTRAVENOUS PRN
Status: DISCONTINUED | OUTPATIENT
Start: 2022-02-15 | End: 2022-02-15

## 2022-02-15 RX ORDER — ONDANSETRON 2 MG/ML
4 INJECTION INTRAMUSCULAR; INTRAVENOUS EVERY 6 HOURS PRN
Status: DISCONTINUED | OUTPATIENT
Start: 2022-02-15 | End: 2022-02-17 | Stop reason: HOSPADM

## 2022-02-15 RX ORDER — DEXAMETHASONE SODIUM PHOSPHATE 4 MG/ML
INJECTION, SOLUTION INTRA-ARTICULAR; INTRALESIONAL; INTRAMUSCULAR; INTRAVENOUS; SOFT TISSUE PRN
Status: DISCONTINUED | OUTPATIENT
Start: 2022-02-15 | End: 2022-02-15

## 2022-02-15 RX ORDER — CLINDAMYCIN PHOSPHATE 900 MG/50ML
900 INJECTION, SOLUTION INTRAVENOUS
Status: COMPLETED | OUTPATIENT
Start: 2022-02-15 | End: 2022-02-15

## 2022-02-15 RX ORDER — DEXMEDETOMIDINE HYDROCHLORIDE 4 UG/ML
INJECTION, SOLUTION INTRAVENOUS PRN
Status: DISCONTINUED | OUTPATIENT
Start: 2022-02-15 | End: 2022-02-15

## 2022-02-15 RX ORDER — ONDANSETRON 2 MG/ML
4 INJECTION INTRAMUSCULAR; INTRAVENOUS EVERY 30 MIN PRN
Status: DISCONTINUED | OUTPATIENT
Start: 2022-02-15 | End: 2022-02-15 | Stop reason: HOSPADM

## 2022-02-15 RX ORDER — CEFAZOLIN SODIUM 1 G/3ML
1 INJECTION, POWDER, FOR SOLUTION INTRAMUSCULAR; INTRAVENOUS EVERY 8 HOURS
Status: COMPLETED | OUTPATIENT
Start: 2022-02-15 | End: 2022-02-15

## 2022-02-15 RX ADMIN — CARVEDILOL 6.25 MG: 6.25 TABLET, FILM COATED ORAL at 17:19

## 2022-02-15 RX ADMIN — SODIUM CHLORIDE: 9 INJECTION, SOLUTION INTRAVENOUS at 13:14

## 2022-02-15 RX ADMIN — PHENYLEPHRINE HYDROCHLORIDE 0.2 MCG/KG/MIN: 10 INJECTION INTRAVENOUS at 07:50

## 2022-02-15 RX ADMIN — Medication 5 MG: at 07:58

## 2022-02-15 RX ADMIN — HYDROMORPHONE HYDROCHLORIDE 0.4 MG: 0.2 INJECTION, SOLUTION INTRAMUSCULAR; INTRAVENOUS; SUBCUTANEOUS at 22:58

## 2022-02-15 RX ADMIN — HEPARIN SODIUM 2000 UNITS: 1000 INJECTION INTRAVENOUS; SUBCUTANEOUS at 10:11

## 2022-02-15 RX ADMIN — DEXMEDETOMIDINE HYDROCHLORIDE 4 MCG: 200 INJECTION INTRAVENOUS at 10:05

## 2022-02-15 RX ADMIN — CEFAZOLIN 1 G: 1 INJECTION, POWDER, FOR SOLUTION INTRAMUSCULAR; INTRAVENOUS at 16:18

## 2022-02-15 RX ADMIN — ACETAMINOPHEN 975 MG: 325 TABLET, FILM COATED ORAL at 21:31

## 2022-02-15 RX ADMIN — MIDAZOLAM 2 MG: 1 INJECTION INTRAMUSCULAR; INTRAVENOUS at 07:32

## 2022-02-15 RX ADMIN — PHENYLEPHRINE HYDROCHLORIDE 100 MCG: 10 INJECTION INTRAVENOUS at 07:44

## 2022-02-15 RX ADMIN — GABAPENTIN 100 MG: 100 CAPSULE ORAL at 16:18

## 2022-02-15 RX ADMIN — DEXMEDETOMIDINE HYDROCHLORIDE 8 MCG: 200 INJECTION INTRAVENOUS at 10:16

## 2022-02-15 RX ADMIN — HYDROMORPHONE HYDROCHLORIDE 0.5 MG: 1 INJECTION, SOLUTION INTRAMUSCULAR; INTRAVENOUS; SUBCUTANEOUS at 08:32

## 2022-02-15 RX ADMIN — ROCURONIUM BROMIDE 10 MG: 50 INJECTION, SOLUTION INTRAVENOUS at 09:16

## 2022-02-15 RX ADMIN — Medication 5 MG: at 08:10

## 2022-02-15 RX ADMIN — FENTANYL CITRATE 50 MCG: 50 INJECTION, SOLUTION INTRAMUSCULAR; INTRAVENOUS at 07:40

## 2022-02-15 RX ADMIN — NEOSTIGMINE METHYLSULFATE 3 MG: 1 INJECTION, SOLUTION INTRAVENOUS at 10:38

## 2022-02-15 RX ADMIN — ACETAMINOPHEN 975 MG: 325 TABLET, FILM COATED ORAL at 14:34

## 2022-02-15 RX ADMIN — DEXAMETHASONE SODIUM PHOSPHATE 4 MG: 4 INJECTION, SOLUTION INTRA-ARTICULAR; INTRALESIONAL; INTRAMUSCULAR; INTRAVENOUS; SOFT TISSUE at 07:56

## 2022-02-15 RX ADMIN — GLYCOPYRROLATE 0.4 MG: 0.2 INJECTION, SOLUTION INTRAMUSCULAR; INTRAVENOUS at 10:39

## 2022-02-15 RX ADMIN — CLINDAMYCIN PHOSPHATE 900 MG: 900 INJECTION, SOLUTION INTRAVENOUS at 06:55

## 2022-02-15 RX ADMIN — ROCURONIUM BROMIDE 10 MG: 50 INJECTION, SOLUTION INTRAVENOUS at 08:46

## 2022-02-15 RX ADMIN — FENTANYL CITRATE 50 MCG: 50 INJECTION, SOLUTION INTRAMUSCULAR; INTRAVENOUS at 08:06

## 2022-02-15 RX ADMIN — SODIUM CHLORIDE, POTASSIUM CHLORIDE, SODIUM LACTATE AND CALCIUM CHLORIDE: 600; 310; 30; 20 INJECTION, SOLUTION INTRAVENOUS at 07:32

## 2022-02-15 RX ADMIN — LISINOPRIL 20 MG: 20 TABLET ORAL at 21:31

## 2022-02-15 RX ADMIN — Medication 5 MG: at 08:22

## 2022-02-15 RX ADMIN — PROPOFOL 20 MG: 10 INJECTION, EMULSION INTRAVENOUS at 10:10

## 2022-02-15 RX ADMIN — PHENYLEPHRINE HYDROCHLORIDE 100 MCG: 10 INJECTION INTRAVENOUS at 08:13

## 2022-02-15 RX ADMIN — DEXMEDETOMIDINE HYDROCHLORIDE 0.5 MCG/KG/HR: 100 INJECTION, SOLUTION INTRAVENOUS at 07:52

## 2022-02-15 RX ADMIN — ROSUVASTATIN CALCIUM 40 MG: 20 TABLET, FILM COATED ORAL at 21:31

## 2022-02-15 RX ADMIN — SENNOSIDES AND DOCUSATE SODIUM 1 TABLET: 50; 8.6 TABLET ORAL at 21:31

## 2022-02-15 RX ADMIN — Medication 5 MG: at 08:13

## 2022-02-15 RX ADMIN — PHENYLEPHRINE HYDROCHLORIDE 100 MCG: 10 INJECTION INTRAVENOUS at 07:47

## 2022-02-15 RX ADMIN — HEPARIN SODIUM 5000 UNITS: 1000 INJECTION INTRAVENOUS; SUBCUTANEOUS at 09:09

## 2022-02-15 RX ADMIN — ONDANSETRON 4 MG: 2 INJECTION INTRAMUSCULAR; INTRAVENOUS at 10:36

## 2022-02-15 RX ADMIN — SODIUM CHLORIDE, POTASSIUM CHLORIDE, SODIUM LACTATE AND CALCIUM CHLORIDE: 600; 310; 30; 20 INJECTION, SOLUTION INTRAVENOUS at 10:05

## 2022-02-15 RX ADMIN — PHENYLEPHRINE HYDROCHLORIDE 100 MCG: 10 INJECTION INTRAVENOUS at 07:43

## 2022-02-15 RX ADMIN — PROPOFOL 30 MG: 10 INJECTION, EMULSION INTRAVENOUS at 09:45

## 2022-02-15 RX ADMIN — AMLODIPINE BESYLATE 5 MG: 5 TABLET ORAL at 21:31

## 2022-02-15 RX ADMIN — ROCURONIUM BROMIDE 30 MG: 50 INJECTION, SOLUTION INTRAVENOUS at 07:40

## 2022-02-15 RX ADMIN — HYDROMORPHONE HYDROCHLORIDE 0.2 MG: 0.2 INJECTION, SOLUTION INTRAMUSCULAR; INTRAVENOUS; SUBCUTANEOUS at 17:19

## 2022-02-15 RX ADMIN — HYDROMORPHONE HYDROCHLORIDE 0.2 MG: 0.2 INJECTION, SOLUTION INTRAMUSCULAR; INTRAVENOUS; SUBCUTANEOUS at 13:51

## 2022-02-15 RX ADMIN — GABAPENTIN 100 MG: 100 CAPSULE ORAL at 21:31

## 2022-02-15 RX ADMIN — CLOPIDOGREL BISULFATE 75 MG: 75 TABLET ORAL at 14:34

## 2022-02-15 RX ADMIN — DEXMEDETOMIDINE HYDROCHLORIDE 8 MCG: 200 INJECTION INTRAVENOUS at 09:43

## 2022-02-15 RX ADMIN — LIDOCAINE HYDROCHLORIDE 80 MG: 20 INJECTION, SOLUTION INFILTRATION; PERINEURAL at 07:40

## 2022-02-15 RX ADMIN — PROPOFOL 150 MG: 10 INJECTION, EMULSION INTRAVENOUS at 07:40

## 2022-02-15 RX ADMIN — KETOROLAC TROMETHAMINE 15 MG: 15 INJECTION, SOLUTION INTRAMUSCULAR; INTRAVENOUS at 16:17

## 2022-02-15 RX ADMIN — CEFAZOLIN 1 G: 1 INJECTION, POWDER, FOR SOLUTION INTRAMUSCULAR; INTRAVENOUS at 23:06

## 2022-02-15 RX ADMIN — KETOROLAC TROMETHAMINE 15 MG: 15 INJECTION, SOLUTION INTRAMUSCULAR; INTRAVENOUS at 21:31

## 2022-02-15 ASSESSMENT — ACTIVITIES OF DAILY LIVING (ADL)
ADLS_ACUITY_SCORE: 7
ADLS_ACUITY_SCORE: 12
ADLS_ACUITY_SCORE: 7

## 2022-02-15 ASSESSMENT — MIFFLIN-ST. JEOR: SCORE: 970.92

## 2022-02-15 NOTE — OP NOTE
Procedure Date: 02/15/2022    PREOPERATIVE DIAGNOSIS:  Occluded right distal thigh graft to below-knee popliteal bypass graft with significant rest pain.    POSTOPERATIVE DIAGNOSIS:  Occluded right distal thigh graft to below-knee popliteal bypass graft with significant rest pain.    PROCEDURE:  Redo right distal femoral to above knee popliteal graft to tibioperoneal trunk bypass with cadaveric superficial femoral artery bypass (D-5).     ANESTHESIA:  General.    PREOPERATIVE MEDICATIONS:  Cleocin 900 mg IV.    SURGEON:  Chadwick Lozano MD    FIRST ASSISTANT:  Yamilet Cox MD (Vascular Fellow).    INDICATIONS FOR PROCEDURE:  A 61-year-old patient has had multiple bypass procedures including aortobifemoral bypass graft, left femoral to popliteal in situ bypass graft.  This has all been functioning well.  She has a right femoral limb to above knee popliteal cadaveric superficial femoral artery bypass graft.  She occluded her outflow in the above knee popliteal artery, but had retrograde flow.  She has had a revision bypass from the end of this graft down to the below-knee popliteal artery using a left arm cephalic vein that has now been occluded.  She has very small distal arteries, but they are patent primarily via the anterior tibial artery.  Graft is not salvageable and we felt that a repeat bypass graft was indicated for limb salvage under informed consent.  The patient has been on chronic dual antiplatelet and her Plavix has been held for the last week.  She comes to the operating room today under informed consent.    DESCRIPTION OF PROCEDURE:  The patient was brought to the operating room and induced under general anesthesia, went without difficulty.  Alvarez catheter was placed.  Calf pneumatic compression boot was placed on the left leg with appropriate padding.  The entire right leg and foot were prepped and draped.  A timeout was called and the sites were identified.    VASCULAR EXPOSURE:  We wanted to make  sure that there was adequate outflow for the revision bypass since we did not have a new angiogram, only the one at her last procedure from 05/2021.  We opened the previous below-knee incision on this thin patient.  I dissected down through the scar tissue.  We identified the hanson of the occluded vein bypass graft.  The artery was quite small.  We dissected down to the anterior tibial artery, which appeared to be disease free, but no more than 2.2 mm in diameter.  This was encircled.  The tibioperoneal trunk had some mild posterior plaquing, but appeared to be patent and this was likewise encircled.  A small crossing vein branch was ligated between 4-0 silk suture and a 7-0 Prolene suture.  The popliteal artery itself measured no more than 2.2 mm in diameter, but was the only available distal target.    We then exposed the distal portion of our previous cadaveric bypass graft with the distal thigh incision.  We identified the hanson of the thrombosed cephalic vein graft and this was dissected free.  The inflow and outflow portion of the cadaveric SFA artery that measured at least 7 mm in diameter was likewise dissected free and encircled with Silastic vessel loops with excellent pulse.    We then went through the prior following procedure of cadaveric superficial femoral artery graft that measured 3 cm in length.  Once completion of the following procedure had occurred, we gave the patient 5000 units of intravenous heparin.    GRAFT TO TIBIAL BYPASS:  We tightened the vessel loops around the inflow superficial femoral artery.  We transected the old graft.  We excised this with the Nicole scissors down to the hanson.  There was minimal hyperplasia and chronic thrombus that was removed.  The cadaveric SFA graft itself was widely patent after removing some of the thrombus.  We then used the inflow portion of our cadaveric new SFA graft and this was spatulated.  End-to-side anastomosis was created with running 6-0 Prolene  suture under loupe magnification.  The new SFA graft dilated very nicely.  An excellent pulse and good hemostasis of the ligated side branches.  Metal Ligaclips were used to sajan the anastomosis.  We made a subcutaneous tunnel from the below-knee incision to the above knee incision and brought the graft in a very gentle curve and controlled this with a padded bulldog distally.    A vascular clamp was applied to the hanson of the occluded graft proximally and vessel loops distally.  We opened the popliteal artery beyond the previous anastomosis.  It was a small vessel, disease free.  We extended the arteriotomy just beyond the anterior tibial artery of the tibioperoneal trunk with anterior tibial artery being small, but widely patent with good backbleeding.  Minimal backbleeding was noted in the tibioperoneal trunk.  Vessel loops were retightened around the inflow to avoid any clamping of our new graft and this was spatulated to an appropriate length and an end-to-side anastomosis was created with running 7-0 Prolene suture and loupe magnification.  Two interrupted 7-0 Prolene sutures were used for good hemostasis and blood flow sequentially allowed to go down the leg with a good Doppler signal in the anterior tibial artery and tibioperoneal trunk and also in the dorsalis pedis artery in the foot where a palpable pulse was noted.    Excellent hemostasis was noted.  The graft was a very appropriate length.  We cut the fascia proximally below-knee to ensure the graft would not be encroached upon and closed the distal fascia with interrupted 3-0 Vicryl.  Both incisions were then closed in layers with interrupted 3-0 Vicryl deep and 4-0 Monocryl in a subcuticular fashion, followed by several interrupted 4-0 nylon sutures for good skin approximation (the patient has a long history of postoperative wound breakdown).  Gauze and Vidhi rolls were applied.  The patient was being given an additional 2000 units intravenous heparin  in 1 hour with no reversal.    The patient tolerated the procedure well.    ESTIMATED BLOOD LOSS:  Less than 25 mL    COMPLICATIONS:  None.    Arterial inflow = A, new cadaveric SFA graft = A, tibial vessels = B (very small with calcification in the tibioperoneal trunk).    The patient will be restarted on dual antiplatelet therapy along with subcutaneous Lovenox b.i.d.    Chadwick Lozano MD        D: 02/15/2022   T: 02/15/2022   MT: BECCA    Name:     KASIA WAN  MRN:      [id number]        Account:        [id number]   :      1960           Procedure Date: 02/15/2022     Document: [id number]    cc:  Chadwick Lozano MD

## 2022-02-15 NOTE — PROGRESS NOTES
Vascular Surgery Progress Note:  POST OP CHECK    S: Very comfortable this evening after redo right leg bypass graft.    O:   Vitals:  BP  Min: 109/60  Max: 146/73  Temp  Av.8  F (36.6  C)  Min: 97.5  F (36.4  C)  Max: 98.1  F (36.7  C)  Pulse  Av.4  Min: 52  Max: 100  I/O last 3 completed shifts:  In: 1250 [I.V.:1200; IV Piggyback:50]  Out: 100 [Urine:50; Blood:50]    Physical Exam: Alert and appropriate.  Very comfortable.    +3 graft and DP pulse on right.    +3 graft and DP pulse on left femoral popliteal in situ      Assessment/Plan: Doing very well.  Aspirin and Plavix.  Twice daily Lovenox while in hospital      Wm. Blake MD

## 2022-02-15 NOTE — PROGRESS NOTES
Patient complains of leg discomfort and requests to stand and walk around pre-op. RN provides SBA supervision to walk around preop until OR team ready to go to OR.  Sister, Celestina at bedside. She will take post-op call from Dr. Lozano and plan to come back later today to meet patient in room to bring another bag in.

## 2022-02-15 NOTE — ANESTHESIA POSTPROCEDURE EVALUATION
Patient: Shirley Hendricks    Procedure: Procedure(s):  RIGHT SUPERFICIAL FEMORAL ARTERY GRAFT TO BELOW KNEE POPLITEAL BYPASS WITH CADAVERIC CRYOLIFE  FEMORAL-POPLITEAL ARTERY SIZE: OUTER DIAMETER: 7MM - 6MM       Diagnosis:Occlusion of right femoropopliteal bypass graft, initial encounter (H) [T87.674D]  Diagnosis Additional Information: No value filed.    Anesthesia Type:  General    Note:  Disposition: Admission   Postop Pain Control: Uneventful            Sign Out: Well controlled pain   PONV: No   Neuro/Psych: Uneventful            Sign Out: Acceptable/Baseline neuro status (Easily wakes to voice.)   Airway/Respiratory: Uneventful            Sign Out: Acceptable/Baseline resp. status   CV/Hemodynamics: Uneventful            Sign Out: Acceptable CV status   Other NRE: NONE   DID A NON-ROUTINE EVENT OCCUR? No    Event details/Postop Comments:  Doing well. Please call with questions/concerns.           Last vitals:  Vitals Value Taken Time   /62 02/15/22 1210   Temp 36.5  C (97.7  F) 02/15/22 1200   Pulse 54 02/15/22 1219   Resp 26 02/15/22 1219   SpO2 94 % 02/15/22 1219   Vitals shown include unvalidated device data.    Electronically Signed By: Jacoby Khan MD  February 15, 2022  12:21 PM

## 2022-02-15 NOTE — PROGRESS NOTES
VASCULAR MEDICINE CHART CHECK    Patient is a 61 year old female who presented for redo right lower extremity bypass due to occluded graft with significant rest pain. She is not diabetic (A1c 5.3%). Her lipids are well controlled (LDL 61) on Crestor 40 mg daily. However, she does continue to smoke. The importance of smoking cessation was discussed. She lives with her  who is an alcoholic, blind and smokes as well as her 3 children who also smoke. She feels that quitting would be impossible if they all continue to smoke around her and they are all not wanting to quit. She already has nicotine patches at home if she decides she wants to try it. We have advised her that if she continues to smoke, her grafts will likely continue to occlude and she will ultimately lose her legs. No formal Vascular Medicine consultation will be undertaken today. Please call if we can be of any further assistance this admission or in the future ([phone]).     Ailin Nichols PA-C

## 2022-02-15 NOTE — ANESTHESIA CARE TRANSFER NOTE
Patient: Shirley Hendricks    Procedure: Procedure(s):  RIGHT SUPERFICIAL FEMORAL ARTERY GRAFT TO BELOW KNEE POPLITEAL BYPASS WITH CADAVERIC CRYOLIFE  FEMORAL-POPLITEAL ARTERY SIZE: OUTER DIAMETER: 7MM - 6MM       Diagnosis: Occlusion of right femoropopliteal bypass graft, initial encounter (H) [T82.268U]  Diagnosis Additional Information: No value filed.    Anesthesia Type:   General     Note:    Oropharynx: spontaneously breathing  Level of Consciousness: drowsy  Oxygen Supplementation: face mask  Level of Supplemental Oxygen (L/min / FiO2): 6  Independent Airway: airway patency satisfactory and stable  Dentition: dentition unchanged  Vital Signs Stable: post-procedure vital signs reviewed and stable  Report to RN Given: handoff report given  Patient transferred to: PACU  Comments: Neuromuscular blockade reversed after TOF 4/4, spontaneous respirations, oropharynx suctioned with soft flexible catheter, airway patent after extubation.  Oxygen via facemask at 6 liters per minute to PACU. Oxygen tubing connected to wall O2 in PACU, SpO2, NiBP, and EKG monitors and alarms on and functioning, report on patient's clinical status given to PACU RN, RN questions answered.     Handoff Report: Identifed the Patient, Identified the Reponsible Provider, Reviewed the pertinent medical history, Discussed the surgical course, Reviewed Intra-OP anesthesia mangement and issues during anesthesia, Set expectations for post-procedure period and Allowed opportunity for questions and acknowledgement of understanding      Vitals:  Vitals Value Taken Time   /67 02/15/22 1107   Temp     Pulse 64 02/15/22 1109   Resp 18 02/15/22 1109   SpO2 99 % 02/15/22 1109   Vitals shown include unvalidated device data.    Electronically Signed By: NOELLE Jones CRNA  February 15, 2022  11:10 AM

## 2022-02-15 NOTE — ANESTHESIA PREPROCEDURE EVALUATION
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 Simons dermatology     Embolism and thrombosis of unspecified artery (H) 2000    Protein C,S, Leiden FV, Lupus Inhibitor Negative     Gastroesophageal reflux disease      Hyperlipidaemia      Hypertension      Lupus (H)     skin     Mild major depression (H) 2017     Myocardial infarction (H)     x3     Osteoarthrosis, unspecified whether generalized or localized, unspecified site      PAD (peripheral artery disease) (H)      Peripheral vascular disease, unspecified (H) 2000    s/p angioplasty with stent right femoral a.; Right iliac and femoral a. clot     Post-menopausal      Reflux esophagitis 2004    EGD: esophagitis and medium HH     SBO (small bowel obstruction) (H) 08/10/2021     SVT (supraventricular tachycardia) (H)      Thrombocytopenia (H)      Uncomplicated asthma      Vitamin C deficiency 2018      Past Surgical History:   Procedure Laterality Date     ANGIOGRAM       ANGIOGRAM Right 2021    Procedure: RIGHT LOWER EXTREMITY ANGIOGRAM WITH LEFT BRACHIAL ARTERY CUTDOWN;  Surgeon: José Luis Hernandez MD;  Location:  OR     BYPASS GRAFT FEMOROPOPLITEAL Right 2020    Procedure: RIGHT FEMORAL GRAFT TO ABOVE-KNEE POPLITEAL BYPASS USING CADAVERIC SUPERFICIAL FEMORAL ARTERY;  Surgeon: Chadwick Lozano MD;  Location: [facility] OR     BYPASS GRAFT  INSITU FEMOROPOPLITEAL Right 7/7/2021    Procedure: CREATION RIGHT FEMORAL TO POPLITEAL ARTERIAL BYPASS USING INSITU VEIN GRAFT;  Surgeon: José Luis Hernandez MD;  Location:  OR     CARDIAC CATHERIZATION  09/03/2009    multivessel CAD without target lesions, med mgmt indicated, preserved ef     CARPAL TUNNEL RELEASE RT/LT Right 05/20/2021     ENDARTERECTOMY CAROTID Right 05/11/2016    Procedure: ENDARTERECTOMY CAROTID;  Surgeon: Chadwick Lozano MD;  Location:  OR     ENDARTERECTOMY CAROTID Left 06/08/2020    Procedure: LEFT CAROTID ENDARTERECTOMY with distal facal vein patch  and EEG;  Surgeon: Chadwick Lozano MD;  Location:  OR     GYN SURGERY  left tube    left salpingectomy     IR LOWER EXTREMITY ANGIOGRAM RIGHT  05/25/2021     IR OR ANGIOGRAM  6/23/2021     LAPAROSCOPIC CHOLECYSTECTOMY N/A 09/27/2017    Procedure: LAPAROSCOPIC CHOLECYSTECTOMY;  LAPAROSCOPIC CHOLECYSTECTOMY;  Surgeon: Jacoby Tapia MD;  Location: [facility]     LAPAROSCOPY DIAGNOSTIC (GENERAL) N/A 8/11/2021    Procedure: Exploratory laparoscopy,  laparoscopic lysis of adhesions, laparotomy;  Surgeon: Corina Ferreira MD;  Location:  OR     ORTHOPEDIC SURGERY      left knee surgery     VASCULAR SURGERY  aoto bi fem  left fem-AK pop     [facility] FABRIC WRAPPING OF ABDOMINAL ANEURYSM       [facility] NONSPECIFIC PROCEDURE  12/2000    angioplasty with stent right fem. a.     [facility] NONSPECIFIC PROCEDURE  1987    sinus surgery     [facility] NONSPECIFIC PROCEDURE  09/02/2009    Emergent left groin exploration with oversewing of bleeding angiographic site. 2. Endarterectomy of common femoral-proximal superficial femoral artery with greater saphenous vein patch angioplasty.   a. Bancroft of accessory right greater saphenous vein.      [facility] NONSPECIFIC PROCEDURE  08/27/2009    occluded left common iliac and external iliac arteries were successfully revascularized with stenting to 8 and 7 mm       Allergies   Allergen Reactions     Contrast Dye  Anaphylaxis     RASH, FACIAL AND NECK SWELLING, SOB, WHEEZING     Pantoprazole      Protonix caused diffuse edema     Chantix [Varenicline]      Terrible dreams     Penicillins Itching      Social History     Tobacco Use     Smoking status: Current Every Day Smoker     Packs/day: 1.00     Years: 52.00     Pack years: 52.00     Types: Cigarettes     Last attempt to quit: 8/3/2021     Years since quittin.5     Smokeless tobacco: Never Used     Tobacco comment:  PPD   Substance Use Topics     Alcohol use: Not Currently     Comment: x1-2 yr      Wt Readings from Last 1 Encounters:   22 48.4 kg (106 lb 9.6 oz)        Anesthesia Evaluation   Pt has had prior anesthetic.     No history of anesthetic complications       ROS/MED HX  ENT/Pulmonary: Comment: Chronic allergic rhinitis     (+) asthma COPD,     Neurologic: Comment: H/o CEA      Cardiovascular: Comment: PVD  Critical ischemia of extremity with history of revascularization of same extremity (H)  SVT (supraventricular tachycardia)  The visual ejection fraction is estimated at 60-65%.    (+) hypertension--CAD ---    METS/Exercise Tolerance:     Hematologic: Comments: H/o thrombocytopenia      Musculoskeletal: Comment: Bilateral carpal tunnel syndrome. Osteoporosis.  (+) arthritis,     GI/Hepatic: Comment: SBO (small bowel obstruction) (H)        (+) GERD,     Renal/Genitourinary:  - neg Renal ROS     Endo: Comment: Hyperlipidemia LDL goal <70      Psychiatric/Substance Use:     (+) psychiatric history anxiety     Infectious Disease:  - neg infectious disease ROS     Malignancy:  - neg malignancy ROS     Other:            Physical Exam    Airway  airway exam normal      Mallampati: II   TM distance: > 3 FB   Neck ROM: full   Mouth opening: > 3 cm    Respiratory Devices and Support         Dental  no notable dental history         Cardiovascular          Rhythm and rate: regular and normal     Pulmonary   pulmonary exam normal                OUTSIDE  LABS:  CBC:   Lab Results   Component Value Date    WBC 6.9 02/11/2022    WBC 6.1 08/14/2021    HGB 12.6 02/11/2022    HGB 9.4 (L) 08/15/2021    HCT 38.9 02/11/2022    HCT 31.1 (L) 08/14/2021     02/11/2022     08/16/2021     BMP:   Lab Results   Component Value Date     (L) 02/11/2022     08/15/2021    POTASSIUM 4.4 02/11/2022    POTASSIUM 3.9 08/15/2021    CHLORIDE 101 02/11/2022    CHLORIDE 108 08/15/2021    CO2 26 02/11/2022    CO2 24 08/15/2021    BUN 14 02/11/2022    BUN 8 08/15/2021    CR 0.67 02/11/2022    CR 0.62 08/16/2021    GLC 94 02/11/2022    GLC 82 08/16/2021     COAGS:   Lab Results   Component Value Date    PTT 25 04/21/2016    INR 1.01 09/24/2020     POC:   Lab Results   Component Value Date    BGM 87 07/08/2021     HEPATIC:   Lab Results   Component Value Date    ALBUMIN 3.6 08/10/2021    PROTTOTAL 7.7 08/10/2021    ALT 24 08/10/2021    AST 19 08/10/2021    ALKPHOS 70 08/10/2021    BILITOTAL 0.5 08/10/2021     OTHER:   Lab Results   Component Value Date    PH 7.42 03/19/2010    LACT 1.1 08/10/2021    A1C 5.4 09/23/2020    SONIA 8.8 02/11/2022    PHOS 4.0 07/09/2021    MAG 2.1 08/13/2021    LIPASE 132 08/10/2021    AMYLASE 46 08/14/2017    TSH 0.77 01/05/2021    T4 1.19 03/28/2017    CRP <2.9 09/18/2014    SED 15 01/05/2021       Anesthesia Plan    ASA Status:  3      Anesthesia Type: General.     - Airway: ETT   Induction: Intravenous.   Maintenance: Balanced.   Techniques and Equipment:     Lines/Monitors: Consented for arterial line if needed for procedure.     Consents    Anesthesia Plan(s) and associated risks, benefits, and realistic alternatives discussed. Questions answered and patient/representative(s) expressed understanding.    - Discussed:     - Discussed with:  Patient         Postoperative Care    Pain management: IV analgesics, Multi-modal analgesia.   PONV prophylaxis: Ondansetron (or other 5HT-3), Dexamethasone or Solumedrol     Comments:                 Jacoby Khan MD

## 2022-02-15 NOTE — OR NURSING
VSS. Neuros intact, CMS intact. Incisions to right leg  CDI. graft pulse 2+, DPs 2+, PTs 1+ palpation. Sister updated. Ready for transfer to room 220.

## 2022-02-15 NOTE — PROGRESS NOTES
PTA medications completed by Medication Scribe day of surgery     Medication history sources: Patient, Surescripts and H&P  In the past week, patient estimated taking medication this percent of the time: Greater than 90%  Adherence assessment: N/A Not Observed    Significant changes made to the medication list:  None      Additional medication history information:   None    Medication reconciliation completed by provider prior to medication history? No    Time spent in this activity: 25 minutes    The information provided in this note is only as accurate as the sources available at the time of update(s)    Prior to Admission medications    Medication Sig Last Dose Taking? Auth Provider   acetaminophen (TYLENOL) 500 MG tablet Take 500-1,000 mg by mouth every 6 hours as needed for mild pain 2/15/2022 at am Yes Reported, Patient   amLODIPine (NORVASC) 5 MG tablet Take 1 tablet (5 mg) by mouth 2 times daily 2/15/2022 at am Yes Vadim Ramirez MD   aspirin (ASA) 81 MG chewable tablet Take 1 tablet (81 mg) by mouth daily MORE THAN TWO WEEKS Yes Chadwick Lozano ELLIPTA 200-25 MCG/INH Inhaler Inhale 1 puff into the lungs daily 2/15/2022 at am Yes Vasquez Benoit MD   Calcium Carb-Cholecalciferol (CALCIUM CARBONATE-VITAMIN D3) 600-400 MG-UNIT TABS TAKE ONE TABLET BY MOUTH TWICE DAILY Unknown at Unknown time Yes Vasquez Benoit MD   carvedilol (COREG) 6.25 MG tablet Take 6.25 mg by mouth 2 times daily (with meals) 2/15/2022 at am Yes Reported, Patient   lisinopril (ZESTRIL) 20 MG tablet Take 20 mg by mouth 2 times daily  2/15/2022 at am Yes Reported, Patient   nicotine (NICODERM CQ) 14 MG/24HR 24 hr patch Place 1 patch onto the skin every 24 hours Unknown at Unknown time Yes Ailin Nichols PA-C   nicotine (NICODERM CQ) 21 MG/24HR 24 hr patch Place 1 patch onto the skin every 24 hours Unknown at Unknown time Yes Vasquez Benoit MD   nicotine (NICODERM CQ) 7 MG/24HR 24 hr patch Place 1 patch onto the skin every  24 hours Start after completing 14 mg patches. Unknown at Unknown time Yes Ailin Nichols PA-C   nitroGLYcerin (NITROSTAT) 0.4 MG sublingual tablet Place 1 tablet (0.4 mg) under the tongue See Admin Instructions for chest pain  at prn Yes Vasqeuz Benoit MD   omeprazole (PRILOSEC) 20 MG DR capsule TAKE ONE CAPSULE BY MOUTH DAILY 2/15/2022 at am Yes Vasquez Benoit MD   rosuvastatin (CRESTOR) 40 MG tablet Take 1 tablet (40 mg) by mouth At Bedtime 2/14/2022 at pm Yes Vasquez Benoit MD   sennosides (SENOKOT) 8.6 MG tablet Take 1 tablet by mouth 2 times daily as needed for constipation 2/14/2022 at pm Yes Krissy Flower PA-C   clopidogrel (PLAVIX) 75 MG tablet Take 1 tablet (75 mg) by mouth daily 2/10/2022 at am  Vasquez Benoit MD   denosumab (PROLIA) 60 MG/ML SOLN injection Inject 1 mL (60 mg) Subcutaneous every 6 months INDICATION: TO TREAT OSTEOPOROSIS   Vasquez Benoit MD     Medication history completed by:    Jonah El CPhT  Medication [facility]

## 2022-02-15 NOTE — ANESTHESIA PROCEDURE NOTES
Airway       Patient location during procedure: OR       Procedure Start/Stop Times: 2/15/2022 7:42 AM  Staff -        Anesthesiologist:  Jacoby Khan MD       CRNA: Marleen Ceja APRN CRNA       Performed By: CRNA  Consent for Airway        Urgency: elective  Indications and Patient Condition       Indications for airway management: lydia-procedural       Induction type:intravenous       Mask difficulty assessment: 1 - vent by mask    Final Airway Details       Final airway type: endotracheal airway       Successful airway: ETT - single  Endotracheal Airway Details        ETT size (mm): 7.0       Cuffed: yes       Successful intubation technique: direct laryngoscopy       DL Blade Type: Ramirez 2       Grade View of Cords: 1       Adjucts: stylet       Position: Right       Measured from: lips       Secured at (cm): 21       Bite block used: None    Post intubation assessment        Placement verified by: capnometry, equal breath sounds and chest rise        Number of attempts at approach: 1       Secured with: pink tape       Ease of procedure: easy       Dentition: Intact and Unchanged

## 2022-02-16 LAB
BASOPHILS # BLD AUTO: 0.1 10E3/UL (ref 0–0.2)
BASOPHILS NFR BLD AUTO: 1 %
EOSINOPHIL # BLD AUTO: 0.1 10E3/UL (ref 0–0.7)
EOSINOPHIL NFR BLD AUTO: 1 %
ERYTHROCYTE [DISTWIDTH] IN BLOOD BY AUTOMATED COUNT: 15.4 % (ref 10–15)
GLUCOSE BLDC GLUCOMTR-MCNC: 85 MG/DL (ref 70–99)
HCT VFR BLD AUTO: 35.1 % (ref 35–47)
HGB BLD-MCNC: 10.9 G/DL (ref 11.7–15.7)
IMM GRANULOCYTES # BLD: 0 10E3/UL
IMM GRANULOCYTES NFR BLD: 0 %
LYMPHOCYTES # BLD AUTO: 1.5 10E3/UL (ref 0.8–5.3)
LYMPHOCYTES NFR BLD AUTO: 17 %
MCH RBC QN AUTO: 27.5 PG (ref 26.5–33)
MCHC RBC AUTO-ENTMCNC: 31.1 G/DL (ref 31.5–36.5)
MCV RBC AUTO: 89 FL (ref 78–100)
MONOCYTES # BLD AUTO: 0.8 10E3/UL (ref 0–1.3)
MONOCYTES NFR BLD AUTO: 8 %
NEUTROPHILS # BLD AUTO: 6.8 10E3/UL (ref 1.6–8.3)
NEUTROPHILS NFR BLD AUTO: 73 %
NRBC # BLD AUTO: 0 10E3/UL
NRBC BLD AUTO-RTO: 0 /100
PLATELET # BLD AUTO: 173 10E3/UL (ref 150–450)
RBC # BLD AUTO: 3.96 10E6/UL (ref 3.8–5.2)
WBC # BLD AUTO: 9.3 10E3/UL (ref 4–11)

## 2022-02-16 PROCEDURE — 120N000001 HC R&B MED SURG/OB

## 2022-02-16 PROCEDURE — 36415 COLL VENOUS BLD VENIPUNCTURE: CPT | Performed by: SURGERY

## 2022-02-16 PROCEDURE — 85025 COMPLETE CBC W/AUTO DIFF WBC: CPT | Performed by: SURGERY

## 2022-02-16 PROCEDURE — 250N000013 HC RX MED GY IP 250 OP 250 PS 637: Performed by: SURGERY

## 2022-02-16 PROCEDURE — 250N000011 HC RX IP 250 OP 636: Performed by: SURGERY

## 2022-02-16 RX ORDER — OXYCODONE HYDROCHLORIDE 5 MG/1
5 TABLET ORAL EVERY 4 HOURS PRN
Qty: 12 TABLET | Refills: 0 | Status: SHIPPED | OUTPATIENT
Start: 2022-02-16 | End: 2022-10-11

## 2022-02-16 RX ORDER — IBUPROFEN 400 MG/1
400 TABLET, FILM COATED ORAL EVERY 6 HOURS PRN
Status: DISCONTINUED | OUTPATIENT
Start: 2022-02-16 | End: 2022-02-17 | Stop reason: HOSPADM

## 2022-02-16 RX ORDER — IBUPROFEN 400 MG/1
400 TABLET, FILM COATED ORAL EVERY 6 HOURS PRN
COMMUNITY
Start: 2022-02-16 | End: 2022-12-27

## 2022-02-16 RX ORDER — NICOTINE 21 MG/24HR
1 PATCH, TRANSDERMAL 24 HOURS TRANSDERMAL DAILY
Status: DISCONTINUED | OUTPATIENT
Start: 2022-02-16 | End: 2022-02-17 | Stop reason: HOSPADM

## 2022-02-16 RX ADMIN — ENOXAPARIN SODIUM 30 MG: 30 INJECTION SUBCUTANEOUS at 18:05

## 2022-02-16 RX ADMIN — ACETAMINOPHEN 975 MG: 325 TABLET, FILM COATED ORAL at 05:22

## 2022-02-16 RX ADMIN — GABAPENTIN 100 MG: 100 CAPSULE ORAL at 08:36

## 2022-02-16 RX ADMIN — NICOTINE 1 PATCH: 14 PATCH, EXTENDED RELEASE TRANSDERMAL at 10:48

## 2022-02-16 RX ADMIN — ASPIRIN 81 MG: 81 TABLET, COATED ORAL at 08:28

## 2022-02-16 RX ADMIN — ROSUVASTATIN CALCIUM 40 MG: 20 TABLET, FILM COATED ORAL at 21:10

## 2022-02-16 RX ADMIN — OXYCODONE HYDROCHLORIDE 10 MG: 5 TABLET ORAL at 22:46

## 2022-02-16 RX ADMIN — LISINOPRIL 20 MG: 20 TABLET ORAL at 21:10

## 2022-02-16 RX ADMIN — ACETAMINOPHEN 975 MG: 325 TABLET, FILM COATED ORAL at 14:50

## 2022-02-16 RX ADMIN — PANTOPRAZOLE SODIUM 40 MG: 40 TABLET, DELAYED RELEASE ORAL at 08:28

## 2022-02-16 RX ADMIN — AMLODIPINE BESYLATE 5 MG: 5 TABLET ORAL at 08:28

## 2022-02-16 RX ADMIN — GABAPENTIN 100 MG: 100 CAPSULE ORAL at 21:10

## 2022-02-16 RX ADMIN — KETOROLAC TROMETHAMINE 15 MG: 15 INJECTION, SOLUTION INTRAMUSCULAR; INTRAVENOUS at 04:21

## 2022-02-16 RX ADMIN — GABAPENTIN 100 MG: 100 CAPSULE ORAL at 16:01

## 2022-02-16 RX ADMIN — LISINOPRIL 20 MG: 20 TABLET ORAL at 08:28

## 2022-02-16 RX ADMIN — POLYETHYLENE GLYCOL 3350 17 G: 17 POWDER, FOR SOLUTION ORAL at 08:28

## 2022-02-16 RX ADMIN — SENNOSIDES AND DOCUSATE SODIUM 1 TABLET: 50; 8.6 TABLET ORAL at 21:10

## 2022-02-16 RX ADMIN — AMLODIPINE BESYLATE 5 MG: 5 TABLET ORAL at 21:10

## 2022-02-16 RX ADMIN — HYDROMORPHONE HYDROCHLORIDE 0.4 MG: 0.2 INJECTION, SOLUTION INTRAMUSCULAR; INTRAVENOUS; SUBCUTANEOUS at 09:56

## 2022-02-16 RX ADMIN — SENNOSIDES AND DOCUSATE SODIUM 1 TABLET: 50; 8.6 TABLET ORAL at 08:28

## 2022-02-16 RX ADMIN — HYDROMORPHONE HYDROCHLORIDE 0.4 MG: 0.2 INJECTION, SOLUTION INTRAMUSCULAR; INTRAVENOUS; SUBCUTANEOUS at 04:21

## 2022-02-16 RX ADMIN — ENOXAPARIN SODIUM 30 MG: 30 INJECTION SUBCUTANEOUS at 05:22

## 2022-02-16 RX ADMIN — FLUTICASONE FUROATE AND VILANTEROL TRIFENATATE 1 PUFF: 200; 25 POWDER RESPIRATORY (INHALATION) at 08:36

## 2022-02-16 RX ADMIN — ACETAMINOPHEN 975 MG: 325 TABLET, FILM COATED ORAL at 22:46

## 2022-02-16 RX ADMIN — OXYCODONE HYDROCHLORIDE 5 MG: 5 TABLET ORAL at 18:16

## 2022-02-16 RX ADMIN — CLOPIDOGREL BISULFATE 75 MG: 75 TABLET ORAL at 08:28

## 2022-02-16 ASSESSMENT — ACTIVITIES OF DAILY LIVING (ADL)
ADLS_ACUITY_SCORE: 7
ADLS_ACUITY_SCORE: 9
ADLS_ACUITY_SCORE: 7

## 2022-02-16 NOTE — PLAN OF CARE
Pt A/O x4. VSS on RA. Pain managed with PRN IV Dilaudid x2 and scheduled Toradol. Denies nausea/vomit. Ambulated to the bathroom and voided twice. Saline locked. On IV antibiotics. Tolerating PO, diet advanced to low fiber. Doppler pulses. Up with SBA, with gait belt. Continue to monitor

## 2022-02-16 NOTE — PROGRESS NOTES
VASCULAR SURGERY    Had very good day. Up walking multiple times. Minimal discomfort.  Discomfort in her right foot is markedly improved--implying more vascular in etiology    Plan Home tomorrow. Does have nicotine patches at home to help find a quit smoking.      Chadwick Lozano MD

## 2022-02-16 NOTE — PROGRESS NOTES
Vascular Surgery Progress Note:  POD#1 outflow revision right leg bypass    S: Very comfortable.  Up walking yesterday.  No nausea.    O:   Vitals:  BP  Min: 109/60  Max: 143/67  Temp  Av.7  F (36.5  C)  Min: 97.3  F (36.3  C)  Max: 98.1  F (36.7  C)  Pulse  Av.8  Min: 52  Max: 66  I/O last 3 completed shifts:  In: 1807 [P.O.:120; I.V.:1637; IV Piggyback:50]  Out: 1100 [Urine:1050; Blood:50]    Physical Exam: Alert and appropriate.  Very comfortable.    Dressings= intact    +3 right graft and DP pulse.    +3 left graft and DP pulse      A1c= 5.3 LDL= 61      Assessment/Plan: #1.  Doing very well following outflow revision to very small distal arteries in the right leg.  Foot is warm and pink.  Does have pain in the foot which is partially related to her chronic neuropathy.  Dual antiplatelet.  Twice daily Lovenox while in hospital     #2.  Widely patent left leg bypass graft     #3.  Overall good risk factor control.  However, needs to quit smoking and she is well aware of this    Continue with activities.  Possibly home tomorrow    Wm. Blake MD

## 2022-02-16 NOTE — PLAN OF CARE
Arrived on unit from surgery at 1300 today.  AOx4.  VSS.  Coarse lungs.  Scheduled IV toradol, and tylenol.  PRN IV dilaudid given x2.  Endorses pain in right leg.  IS to 1500.  Minimal sensation in bilateral feet, redness noted.  Doppler used to assess bilat pedal pulses.  Clubbing in bilateral feet.  Alvarez catheter removed, due to  void.  Not OOB yet.  Tolerating full liquid diet, advanced to low fiber for next meal.  Capno 35/10.  PCD on.  Nursing will continue to monitor.

## 2022-02-17 VITALS
BODY MASS INDEX: 18.77 KG/M2 | RESPIRATION RATE: 18 BRPM | TEMPERATURE: 97.4 F | WEIGHT: 102 LBS | HEART RATE: 54 BPM | DIASTOLIC BLOOD PRESSURE: 60 MMHG | OXYGEN SATURATION: 91 % | HEIGHT: 62 IN | SYSTOLIC BLOOD PRESSURE: 142 MMHG

## 2022-02-17 LAB — GLUCOSE BLDC GLUCOMTR-MCNC: 81 MG/DL (ref 70–99)

## 2022-02-17 PROCEDURE — 250N000013 HC RX MED GY IP 250 OP 250 PS 637: Performed by: SURGERY

## 2022-02-17 PROCEDURE — 250N000011 HC RX IP 250 OP 636: Performed by: SURGERY

## 2022-02-17 RX ORDER — GABAPENTIN 100 MG/1
100 CAPSULE ORAL 3 TIMES DAILY
Qty: 90 CAPSULE | Refills: 3 | Status: SHIPPED | OUTPATIENT
Start: 2022-02-17 | End: 2022-08-19

## 2022-02-17 RX ADMIN — LISINOPRIL 20 MG: 20 TABLET ORAL at 09:00

## 2022-02-17 RX ADMIN — CLOPIDOGREL BISULFATE 75 MG: 75 TABLET ORAL at 09:01

## 2022-02-17 RX ADMIN — SENNOSIDES AND DOCUSATE SODIUM 1 TABLET: 50; 8.6 TABLET ORAL at 09:01

## 2022-02-17 RX ADMIN — OXYCODONE HYDROCHLORIDE 10 MG: 5 TABLET ORAL at 03:41

## 2022-02-17 RX ADMIN — ACETAMINOPHEN 975 MG: 325 TABLET, FILM COATED ORAL at 06:20

## 2022-02-17 RX ADMIN — POLYETHYLENE GLYCOL 3350 17 G: 17 POWDER, FOR SOLUTION ORAL at 09:01

## 2022-02-17 RX ADMIN — AMLODIPINE BESYLATE 5 MG: 5 TABLET ORAL at 09:00

## 2022-02-17 RX ADMIN — ENOXAPARIN SODIUM 30 MG: 30 INJECTION SUBCUTANEOUS at 06:20

## 2022-02-17 RX ADMIN — FLUTICASONE FUROATE AND VILANTEROL TRIFENATATE 1 PUFF: 200; 25 POWDER RESPIRATORY (INHALATION) at 09:04

## 2022-02-17 RX ADMIN — GABAPENTIN 100 MG: 100 CAPSULE ORAL at 09:00

## 2022-02-17 RX ADMIN — ASPIRIN 81 MG: 81 TABLET, COATED ORAL at 09:00

## 2022-02-17 RX ADMIN — CARVEDILOL 6.25 MG: 6.25 TABLET, FILM COATED ORAL at 09:00

## 2022-02-17 RX ADMIN — PANTOPRAZOLE SODIUM 40 MG: 40 TABLET, DELAYED RELEASE ORAL at 09:00

## 2022-02-17 RX ADMIN — NICOTINE 1 PATCH: 14 PATCH, EXTENDED RELEASE TRANSDERMAL at 09:01

## 2022-02-17 ASSESSMENT — ACTIVITIES OF DAILY LIVING (ADL)
ADLS_ACUITY_SCORE: 9

## 2022-02-17 NOTE — PROVIDER NOTIFICATION
MD Notification    Notified Person: MD    Notified Person Name: Blake    Notification Date/Time: 02/16/22 1020    Notification Interaction: text page    Purpose of Notification: IV painful with flush    Orders Received:  Change from toradol to advil.  Encourage pt to use oxycodone for pain control as that is what she will have at discharge    Comments:

## 2022-02-17 NOTE — PROGRESS NOTES
Vascular Surgery Progress Note    S: Had a very good day yesterday and last evening.  Slept well.  Feels the Neurontin is very helpful for her neuropathy.  Fully ambulatory.  Ready for home.      O:   Vitals:  BP  Min: 125/56  Max: 165/64  Temp  Av.7  F (35.9  C)  Min: 96.2  F (35.7  C)  Max: 97.2  F (36.2  C)  Pulse  Av.3  Min: 52  Max: 60  I/O last 3 completed shifts:  In: -   Out: 3950 [Urine:3950]    Physical Exam:.  Alert and appropriate.  Quite comfortable    Wds=A    +3 bilateral graft and DP pulses      All blood glucoses< 100   Hgb= 10.9 consistent with mild anemia.      Assessment/Plan: #1.  Widely patent revision outflow graft.  Home on aspirin/Plavix.  Office follow-up approximately 2 and half weeks for suture removal     #2.  We will add Neurontin to medications.     #3.  Needs to quit smoking.  She is well aware.  Has nicotine patches at home from last admission     #4.  Mild anemia.  Start oral iron.      Wm. Blake MD

## 2022-02-17 NOTE — PLAN OF CARE
Goal Outcome Evaluation:Oriented x4.  VSS but bradycardic.  Up to chair and ambulating in room with walker.  CO pain in right leg, PRN oxycodone given.  Dressing removed from right leg, stitches approximated.  Doppler used to assess pedal pulses, normal.  Bilateral feet are reddened, diminished sensation.  Weakness noted in  strength and pt  stated she cannot feel well with her hands.  Tolerating regular diet.   No BM yet.  IV tender with flushing, not using and MD aware.  Nicotine patch on right arm.  Likely discharge to home tomorrow with oxycodone.        Plan of Care Reviewed With: patient,        Outcome Evaluation: VSS. Neuros intact, CMS intact. Incisions to right leg  CDI. graft pulse 2+, DPs 2+, PTs 1+ palpation. Sister updated. Ready for transfer to room 220.

## 2022-02-17 NOTE — PROGRESS NOTES
VSS. On RA. A/Ox4. No pain meds given this morning. PIV removed. Discharge education provided and medications and belongings are with patient. Discharged home with son picking up.

## 2022-02-17 NOTE — PLAN OF CARE
Goal Outcome Evaluation:  POD 2 outflow revision of R leg bypass. VS remain stable on RA. Pain in RLE controlled w/ PRN Oxy and scheduled Tylenol. Reports pain in R foot has improved. Numbness/decreased sensation in hands/feet unchanged from baseline. RLE elevated on pillows in bed. RLE incisions w/ sutures remains RIN w/ no drainage. Pulses +2, palpable. Extremities remain warm. Tolerating regular diet, no nausea. Reporting constipation, scheduled senna given, declined further PRNs this shift. Voiding well. Up independently w/ walker. Plan for possible discharge home today.       Plan of Care Reviewed With: Patient

## 2022-02-18 ENCOUNTER — PATIENT OUTREACH (OUTPATIENT)
Dept: CARE COORDINATION | Facility: CLINIC | Age: 62
End: 2022-02-18
Payer: COMMERCIAL

## 2022-02-18 DIAGNOSIS — Z71.89 OTHER SPECIFIED COUNSELING: ICD-10-CM

## 2022-02-18 NOTE — PROGRESS NOTES
"Clinic Care Coordination Contact  [facility]: Post-Discharge Note  SITUATION                                                      Admission:    Admission Date: 02/15/22   Reason for Admission: vascular procedures  Discharge:   Discharge Date: 02/17/22  Discharge Diagnosis: Peripheral artery disease with occluded right thigh graft to below-knee popliteal vein bypass    BACKGROUND                                                      Per hospital discharge summary and inpatient provider notes: Admitted on 02/15/2022 where under general anesthetic, we performed an outflow revision using a cadaveric SFA graft.  The below-knee popliteal arteries were quite small, but relatively disease free with primary runoff via the anterior tibial artery.  She tolerated the procedure quite well.     Postoperatively pain was well controlled.  She was placed on Neurontin for pain control along with her history of peripheral neuropathy, and this worked out quite well.  She had an excellent palpable pulse in the right leg bypass graft and dorsalis pedis artery.  Also an excellent pulse and Doppler signal within her previous left femoral popliteal in situ bypass graft and DP artery.     The patient was fully ambulatory.  The pain in her foot significantly improved, implying a lot of the issues were vascular.     History of hyperlipidemia, on statin with LDL 61, indicative of good control.  Mild acute blood loss anemia with her hemoglobin to 10.9 postoperatively, placed on iron.     The patient has been a long-term smoker, and was reinitiated on Nicoderm patches, which she will continue post-procedure.  It was felt that there was no need for any significant change in her medications      ASSESSMENT           Discharge Assessment  How are you doing now that you are home?: \" I am doing well moving around\"  How are your symptoms? (Red Flag symptoms escalate to triage hotline per guidelines): Improved  Do you feel your condition is stable " enough to be safe at home until your provider visit?: Yes  Does the patient have their discharge instructions? : Yes  Does the patient have questions regarding their discharge instructions? : No  Were you started on any new medications or were there changes to any of your previous medications? : Yes  Does the patient have all of their medications?: Yes  Do you have questions regarding any of your medications? : No  Do you have all of your needed medical supplies or equipment (DME)?  (i.e. oxygen tank, CPAP, cane, etc.): Yes  Discharge follow-up appointment scheduled within 14 calendar days? : Yes  Discharge Follow Up Appointment Date: 03/03/22  Discharge Follow Up Appointment Scheduled with?: Specialty Care Provider    Post-op ([facility] Only)  If the patient had a surgery or procedure, do they have any questions for a nurse?: No             PLAN                                                      Outpatient Plan:  Follow up with me, Chadwick Lozano, within 2 weeks. to evaluate  after surgery. The following labs/tests are recommended: Graft Duplex  in 8 weeks.    Future Appointments   Date Time Provider Department Center   3/3/2022  9:15 AM Chadwick Lozano [facility]   4/14/2022  9:00 AM Babar Scales MD [facility]         For any urgent concerns, please contact our 24 hour nurse triage line: [phone] ([phone])         Alayna Jaramillo

## 2022-02-18 NOTE — DISCHARGE SUMMARY
Service Date: 02/17/2022  Discharge Date: 02/17/2022      HISTORY:  This is a 61-year-old patient with multiple vascular procedures has occluded vein bypass graft from her right femoral to above knee popliteal cadaveric SFA graft down to the below-knee popliteal artery.  She now has a very short distance claudication and rest pain with no ulcers.  It was felt that outflow revision was indicated for limb salvage.    HOSPITAL COURSE:  Admitted on 02/15/2022 where under general anesthetic, we performed an outflow revision using a cadaveric SFA graft.  The below-knee popliteal arteries were quite small, but relatively disease free with primary runoff via the anterior tibial artery.  She tolerated the procedure quite well.    Postoperatively pain was well controlled.  She was placed on Neurontin for pain control along with her history of peripheral neuropathy, and this worked out quite well.  She had an excellent palpable pulse in the right leg bypass graft and dorsalis pedis artery.  Also an excellent pulse and Doppler signal within her previous left femoral popliteal in situ bypass graft and DP artery.    The patient was fully ambulatory.  The pain in her foot significantly improved, implying a lot of the issues were vascular.    History of hyperlipidemia, on statin with LDL 61, indicative of good control.  Mild acute blood loss anemia with her hemoglobin to 10.9 postoperatively, placed on iron.    The patient has been a long-term smoker, and was reinitiated on Nicoderm patches, which she will continue post-procedure.  It was felt that there was no need for any significant change in her medications.    DISCHARGE MEDICATIONS:  Unchanged.  Continue on aspirin/Plavix.    FINAL DIAGNOSES:   1.  Peripheral artery disease with occluded right thigh graft to below-knee popliteal vein bypass.  A.  Status post outflow revision with cadaveric superficial femoral artery.  2.  History of prior aortobifemoral bypass graft and left  femoral popliteal in situ bypass graft that is functioning well.  3.  History of carotid endarterectomy.  4.  Hypertension.  5.  Hyperlipidemia on statin with good control.   6.  Mild acute blood loss anemia.   7.  Chronic tobacco abuse.    PLAN:  The patient will be discharged to home.  She is well aware of her postoperative instructions for her multiple vascular procedures.  She will follow up with me in the office in 2-3 weeks.  Routine surveillance of the graft as well will be performed.    Chadwick Lozano MD        D: 2022   T: 2022   MT: LARISA    Name:     KASIA WAN  MRN:      [id number]        Account:      [id number]   :      1960           Service Date: 2022                                  Discharge Date: 2022     Document: [id number]

## 2022-02-20 ENCOUNTER — HEALTH MAINTENANCE LETTER (OUTPATIENT)
Age: 62
End: 2022-02-20

## 2022-03-01 NOTE — PROGRESS NOTES
[facility]    Shirley Hendricks returns for her first postoperative follow-up.  Significant history of PAD with prior aortobifemoral bypass graft and left femoral-popliteal in situ bypass graft that have worked well.  Had undergone a right femoral graft limb to above-knee popliteal cadaveric SFA bypass.  Occluded the native distal above-knee popliteal artery but failed angioplasty.  Repeat bypass from distal graft to below-knee popliteal with left arm cephalic vein failed with increasing symptoms.    Redo bypass with cadaveric SFA performed 2/15/2022.  Distal anastomosis to the very distal popliteal artery extending onto the tibial peroneal trunk with primarily anterior tibial outflow.  She did very well with excellent DP pulses bilaterally and graft pulses.     LDL= 61   Hemoglobin= 10.9   Placed on Neurontin for peripheral neuropathy.     Chronic aspirin/Plavix   History of smoking on patches    PRESENT SITUATION: Her leg has felt excellent since surgery.  She had the easiest recovery this time around.  Mild ankle swelling that is improving.  On Neurontin 100 mg 3 times daily which has helped the peripheral neuropathy along with the revascularization.  Right foot feels completely back to normal.  Still working on smoking cessation    Exam: Alert and appropriate.  Very comfortable.   Blood pressure 123/65.  Pulse 57   Well-healing right thigh and calf incision.  Nylon sutures intact   Mild right ankle edema   +3 palpable right and left graft pulses in both DP pulses   +3 hyperemic right DP Doppler.  +3 biphasic left DP Doppler      Impression:   Widely patent outflow revision to very distal popliteal/tibial peroneal trunk using cadaveric SFA.  Smaller distal vessels with some mild disease.  Continue on aspirin/Plavix.    Due to wound healing issues she will wait another week before she herself removes these nylon sutures.    Follow-up graft duplex in 3 months.  Continue to work on smoking  cessation.  Continue with Neurontin for peripheral neuropathy       Chadwick Lozano MD  This note was created using Dragon voice recognition software which may result in transcription errors.

## 2022-03-03 ENCOUNTER — OFFICE VISIT (OUTPATIENT)
Dept: OTHER | Facility: CLINIC | Age: 62
End: 2022-03-03
Attending: INTERNAL MEDICINE
Payer: COMMERCIAL

## 2022-03-03 VITALS — HEART RATE: 57 BPM | DIASTOLIC BLOOD PRESSURE: 65 MMHG | SYSTOLIC BLOOD PRESSURE: 123 MMHG

## 2022-03-03 DIAGNOSIS — I73.9 PAD (PERIPHERAL ARTERY DISEASE) (H): Primary | ICD-10-CM

## 2022-03-03 DIAGNOSIS — F17.200 SMOKING: ICD-10-CM

## 2022-03-03 DIAGNOSIS — G58.9 MONONEUROPATHY: ICD-10-CM

## 2022-03-03 PROCEDURE — G0463 HOSPITAL OUTPT CLINIC VISIT: HCPCS

## 2022-03-03 PROCEDURE — 99024 POSTOP FOLLOW-UP VISIT: CPT | Performed by: SURGERY

## 2022-03-03 NOTE — PROGRESS NOTES
[facility]        Patient is here for a  Postop    Pt is currently taking Aspirin, Statin and Plavix.    /65 (BP Location: Left arm, Patient Position: Chair, Cuff Size: Adult Regular)   Pulse 57   Breastfeeding No     The provider has been notified that the patient has no concerns.     Questions patient would like addressed today are: N/A.    Refills are needed: N/A    Has homecare services and agency name:  Isa Al MA

## 2022-03-27 DIAGNOSIS — I10 ESSENTIAL HYPERTENSION: Primary | ICD-10-CM

## 2022-03-29 RX ORDER — LISINOPRIL 20 MG/1
TABLET ORAL
Qty: 180 TABLET | Refills: 2 | Status: ON HOLD | OUTPATIENT
Start: 2022-03-29 | End: 2022-12-30

## 2022-03-29 NOTE — TELEPHONE ENCOUNTER
Prescription approved per [facility] Refill Protocol.  Mary Murillo, RN  [facility] Triage Nurse

## 2022-04-11 DIAGNOSIS — I10 ESSENTIAL HYPERTENSION: Primary | ICD-10-CM

## 2022-04-12 NOTE — TELEPHONE ENCOUNTER
Routing refill request to provider for review/approval because:  Medication is reported  Jadyn Rivera RN

## 2022-04-13 RX ORDER — CARVEDILOL 6.25 MG/1
TABLET ORAL
Qty: 180 TABLET | Refills: 3 | Status: SHIPPED | OUTPATIENT
Start: 2022-04-13 | End: 2023-04-28

## 2022-04-14 ENCOUNTER — VIRTUAL VISIT (OUTPATIENT)
Dept: NEUROLOGY | Facility: CLINIC | Age: 62
End: 2022-04-14
Payer: COMMERCIAL

## 2022-04-14 DIAGNOSIS — I73.9 PERIPHERAL ARTERIAL DISEASE (H): ICD-10-CM

## 2022-04-14 DIAGNOSIS — G60.0 CHARCOT-MARIE-TOOTH DISEASE TYPE 1A: Primary | ICD-10-CM

## 2022-04-14 DIAGNOSIS — R29.898 HAND WEAKNESS: ICD-10-CM

## 2022-04-14 PROCEDURE — 99214 OFFICE O/P EST MOD 30 MIN: CPT | Mod: GT | Performed by: PSYCHIATRY & NEUROLOGY

## 2022-04-14 NOTE — PROGRESS NOTES
Shirley is a 61 year old who is being evaluated via a billable video visit.      How would you like to obtain your AVS? Mychart  If the video visit is dropped, the invitation should be resent by: [phone]      Video Start Time: 8.56 am  Video-Visit Details    Type of service:  Video Visit    Video End Time:9.16 am    Originating Location (pt. Location): Home    Distant Location (provider location):  [facility]     Platform used for Video Visit: QuVIS

## 2022-04-14 NOTE — LETTER
2022       RE: Shirley Hendrciks  [address]  [facility] [address]     Dear Colleague,    Thank you for referring your patient, Shirley Hendricks, to the [facility] [place] at [facility]. Please see a copy of my visit note below.    Service Date: 2022    Vasquez Benoit MD   [facility]  [address] [address]    Chadwick Lozano MD   [facility]  [address] [place] [address], [address]    RE:      Shirley Hendricks  MRN:  [id number]  :   1960    Dear Doctors:    I had the pleasure to evaluate Shirley Hendricks via billable video visit today.  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  She also had a pathogenic variant in the MME gene that has been associated with late-onset CMT, but those are usually recessive, and only one abnormal copy was found.      Her chief complaints related to the Charcot-Breonna-Tooth are numbness in the feet and hands, tingling and pain as well as weakness.  She does not use any AFOs or other braces, no cane or walker.  She has not had any falls in the last year, but she had some near falls.  She is more unsteady, especially when she is in the shower with eyes closed.  She loses her balance quite frequently.  She has not seen Physical Therapy in the last year because anytime she attempted to schedule an appointment, she had to get into a vascular or other type of surgery, so there were conflicts.  She also drops things from her hands very frequently and more than last year.    Electrodiagnostic studies did show a severe sensorimotor polyneuropathy with demyelinating features, but there was also possibly superimposed median neuropathy at the wrist consistent with carpal  tunnel syndrome, and this was also found on ultrasound.  She used to have a lot of pain in the hands and at night or early in the morning and had to shake her hands to get some relief.  She underwent a right carpal tunnel endoscopic surgery at [facility] last year in 05/2021.  It did help the pain, but it did not do much for the numbness or the weakness in the hands because those are likely CMT related.  She did not have a left-sided surgery.  She denies any dysphagia except for rare occasions about once every 2 weeks where foods may get stuck in her throat.  She has not tried flushing it with liquid.  She denies dyspnea on exertion or orthopnea.      She has a 31-year-old son, and she is concerned because he has the same foot appearance as she does.  The son has not contacted our clinic.      Importantly, Shirley also has comorbid severe peripheral arterial disease.  She has had bilateral carotid endarterectomies in the past. She had a repeat carotid Doppler in 11/2021 ordered by Dr. Lozano.  The left carotid endarterectomy was done 06/2020, the right 05/2016.  There is approximately less than 50% stenosis of the right ICA and 50%-69% stenosis of the left ICA, unchanged from previous Doppler.  She has had critical right lower extremity ischemia requiring a femoral-popliteal bypass with one of her own left upper extremity veins that was attempted last year.  Unfortunately, this bypass failed as there was no flow to the limb, and it had to be revised.  She had repeat surgery by Dr. Lozano on 02/15/2022, and the new graft has worked very well. Her leg pain and coldness has improved. She is on aspirin, statin and Plavix.  Her lipid profile was last checked in 11/2021 and was very good with LDL 61, HDL 48 and total cholesterol 124.  Her hemoglobin A1c was 5.3%.  The big vascular risk factor is smoking in her case, which she has not stopped yet, although she is trying to quit.  She is also on lisinopril for  hypertension.    MEDICATIONS:  Reviewed and are as per Epic record.    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 I explained to her the mode of inheritance of this disease.  There is a 50% risk of transmission to offspring.  Her son may call our clinic; I provided her the number, and we are happy to evaluate him.      She should see Physical and Occupational Therapy.  I explained to her that we do not have any disease-modifying treatment for her CMT.  It is very important that her balance is assessed and Occupational Therapy evaluate her for possible equipment to facilitate activities of ADLs with her hands, as she has significant limitations (difficulty cutting food or putting keys into locks, opening jars, etc.).  I would not recommend a left carpal tunnel surgery right now, but we can observe it.  She should consider surgery if she has a lot of pain in her hands, but surgery, of course, will not do much for the persistent numbness and weakness related to the CMT in the hands.      She should continue following with Dr. Lozano.  Once again, I encouraged her to stop smoking.  She will return to clinic for followup in 1 year or sooner if needed.    Total time spent on this encounter today was 35 minutes, of which 20 on video call from 8:56 a.m. to 9:16 a.m., 10 on post visit note dictation, editing and orders and 5 in pre-visit chart review.    Sincerely,  Babar Scales MD        D: 2022   T: 2022   MT: waleska    Name:     KASIA WAN  MRN:      [id number]        Account:      [id number]   :      1960           Service Date: 2022       Document: [id number]

## 2022-04-14 NOTE — PROGRESS NOTES
Service Date: 2022    Vasquez Benoit MD   [facility]  [address] [address]    Chadwick Lozano MD   [facility]  [facility] [place], [place], [address]    RE:      Shirley Hendricks  MRN:  [id number]  :   1960    Dear Doctors:    I had the pleasure to evaluate Shirley Hendricks via billable video visit today.  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  She also had a pathogenic variant in the MME gene that has been associated with late-onset CMT, but those are usually recessive, and only one abnormal copy was found.      Her chief complaints related to the Charcot-Breonna-Tooth are numbness in the feet and hands, tingling and pain as well as weakness.  She does not use any AFOs or other braces, no cane or walker.  She has not had any falls in the last year, but she had some near falls.  She is more unsteady, especially when she is in the shower with eyes closed.  She loses her balance quite frequently.  She has not seen Physical Therapy in the last year because anytime she attempted to schedule an appointment, she had to get into a vascular or other type of surgery, so there were conflicts.  She also drops things from her hands very frequently and more than last year.    Electrodiagnostic studies did show a severe sensorimotor polyneuropathy with demyelinating features, but there was also possibly superimposed median neuropathy at the wrist consistent with carpal tunnel syndrome, and this was also found on ultrasound.  She used to have a lot of pain in the hands and at night or early in the morning and had to shake her hands to get some relief.  She underwent a right carpal tunnel endoscopic surgery at [facility] last year in 2021.  It did help the pain, but it did not do  much for the numbness or the weakness in the hands because those are likely CMT related.  She did not have a left-sided surgery.  She denies any dysphagia except for rare occasions about once every 2 weeks where foods may get stuck in her throat.  She has not tried flushing it with liquid.  She denies dyspnea on exertion or orthopnea.      She has a 31-year-old son, and she is concerned because he has the same foot appearance as she does.  The son has not contacted our clinic.      Importantly, Shirley also has comorbid severe peripheral arterial disease.  She has had bilateral carotid endarterectomies in the past. She had a repeat carotid Doppler in 11/2021 ordered by Dr. Lozano.  The left carotid endarterectomy was done 06/2020, the right 05/2016.  There is approximately less than 50% stenosis of the right ICA and 50%-69% stenosis of the left ICA, unchanged from previous Doppler.  She has had critical right lower extremity ischemia requiring a femoral-popliteal bypass with one of her own left upper extremity veins that was attempted last year.  Unfortunately, this bypass failed as there was no flow to the limb, and it had to be revised.  She had repeat surgery by Dr. Lozano on 02/15/2022, and the new graft has worked very well. Her leg pain and coldness has improved. She is on aspirin, statin and Plavix.  Her lipid profile was last checked in 11/2021 and was very good with LDL 61, HDL 48 and total cholesterol 124.  Her hemoglobin A1c was 5.3%.  The big vascular risk factor is smoking in her case, which she has not stopped yet, although she is trying to quit.  She is also on lisinopril for hypertension.    MEDICATIONS:  Reviewed and are as per Epic record.    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 I explained to her the mode of inheritance of this disease.  There is a 50% risk of transmission to offspring.  Her son may call our clinic; I provided her the number, and we are happy to evaluate him.      She should see  Physical and Occupational Therapy.  I explained to her that we do not have any disease-modifying treatment for her CMT.  It is very important that her balance is assessed and Occupational Therapy evaluate her for possible equipment to facilitate activities of ADLs with her hands, as she has significant limitations (difficulty cutting food or putting keys into locks, opening jars, etc.).  I would not recommend a left carpal tunnel surgery right now, but we can observe it.  She should consider surgery if she has a lot of pain in her hands, but surgery, of course, will not do much for the persistent numbness and weakness related to the CMT in the hands.      She should continue following with Dr. Lozano.  Once again, I encouraged her to stop smoking.  She will return to clinic for followup in 1 year or sooner if needed.    Total time spent on this encounter today was 35 minutes, of which 20 on video call from 8:56 a.m. to 9:16 a.m., 10 on post visit note dictation, editing and orders and 5 in pre-visit chart review.    Sincerely,    Babar Scales MD        D: 2022   T: 2022   MT: waleska    Name:     KASIA WANTomy  MRN:      [id number]        Account:      [id number]   :      1960           Service Date: 2022       Document: [id number]

## 2022-04-21 ENCOUNTER — HOSPITAL ENCOUNTER (OUTPATIENT)
Dept: PHYSICAL THERAPY | Facility: CLINIC | Age: 62
Discharge: HOME | End: 2022-04-21
Attending: PSYCHIATRY & NEUROLOGY
Payer: COMMERCIAL

## 2022-04-21 DIAGNOSIS — R26.89 BALANCE PROBLEMS: ICD-10-CM

## 2022-04-21 DIAGNOSIS — G60.0 CHARCOT-MARIE-TOOTH DISEASE TYPE 1A: Primary | ICD-10-CM

## 2022-04-21 PROCEDURE — 97162 PT EVAL MOD COMPLEX 30 MIN: CPT | Mod: GP | Performed by: PHYSICAL THERAPIST

## 2022-04-21 NOTE — PROGRESS NOTES
"   04/21/22 0900   Quick Adds   Type of Visit Initial OP PT Evaluation   General Information   Start of Care Date 04/21/22   Referring Physician Babar Scales MD   Orders Evaluate and Treat as Indicated   Order Date 04/14/22   Medical Diagnosis Charcot-Breonna-Tooth disease type 1A   Pertinent history of current problem (include personal factors and/or comorbidities that impact the POC) Pt is a 62 y/o female referred to OP PT following diagnosis of Charcot-Breonna Tooth disease ~1 yr ago that has been experiencing increased sensory loss of B LE and UE, decreased balance, and weakness in B LE. Pt reports experiencing clumsiness and loss of sensation in legs her whole life, but says it worsened at the beginning of 2021. Pt reports falling once last year in which she fell anteriorly d/t her legs \"not working\". Pt denies serious injury from fall and no falls since, although many close-call falls in lateral directions that she has been able to catch. Pt lives with two adult sons and a  who is blind that she primarily cares for. Pt describes  as an alcoholic. PMH includes Cervicalgia, Bilateral carpal tunnel syndrome, Chronic allergic rhinitis due to animal hair and dander, Chronic obstructive pulmonary disease, unspecified COPD type (H), Peripheral Arterial Disease, and a femoral-popliteal bypass of her R leg d/t ischemia (2/15/22). Pt reports R leg is doing well after surgery recent surgery and has been wearing her compression garments. The pt used to work as a , but is now retired. The pt has a [employer] pedaling bike at home that she uses every 2-3 days. The pt enjoys cooking but has found the most difficulty with her balance when walking while holding objects that block her feet.   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 Pt has walker d/t use following R LE popliteal graft surgery from february 2022 but has not been using recently   Fall Risk Screen   Fall screen completed by PT   Have you fallen 2 or more times in the past year? No   Have you fallen and had an injury in the past year? No   Is patient a fall risk? Yes   Fall screen comments Per DGI score 18/24   Pain   Pain comments Pt denies pain in R LE   Cognitive Status Examination   Orientation orientation to person, place and time   Posture   Posture Forward head position;Protracted shoulders   Range of Motion (ROM)   ROM Comment Noted functional limitation in B ankle DF   Strength   Strength Comments 3+/5 t/o B LE, except B hamstrings 4-/5 and B DF 3/5. Demonstrated ratcheting against resistance, most liekly d/t weakness rather than abnormal tone   Transfer Skills   Transfer Transfer Skill: Sit/Stand   Transfer Skill: Sit to Stand   Level of [place]: Sit to Stand independent   Assistive Device: Sit to Stand other (see comments)  (Pt required UE on knees to complete STS and exhibited signs of instability w/ standing)   Gait   Gait Comments Pt ambulates w/ slow velocity, slight R in toeing, and forward shoulder posture (observing feet when walking)   Gait Special Tests   Gait Special Tests DYNAMIC GAIT INDEX;25 FOOT TIMED WALK   Gait Special Tests 25 Foot Timed Walk   Seconds 13.25 sec   Steps 20 Steps   Comments 0.58 m/s   Gait Special Tests Dynamic Gait Index   Score out of 24 18   Balance Special Tests   Balance Special Tests Sit to stand reps;Modified CTSIB Conditions   Balance Special Tests Modified CTSIB Conditions   Condition 1, seconds 30 Seconds  (no sway)   Condition 2, seconds 30 Seconds  (mild sway)   Condition 4, seconds 5.33 Seconds  (avg of 3, 5, 8  sec)   Condition 5, seconds 0 Seconds  (Unable and hesitant)   Balance Special Tests Sit to Stand Reps in 30 Seconds   Reps in 30 seconds 2   Height Standard chair using hands at knees to stand, severe sway and LOB that pt was able to self-correct   Sensory Examination   Sensory Perception Comments Per pt report decreased sensation in B fingers that varies and B LE, R to mid shin, L to ankle   Planned Therapy Interventions   Planned Therapy Interventions balance training;gait training;neuromuscular re-education;orthotic fitting/training;ROM;strengthening   Clinical Impression   Criteria for Skilled Therapeutic Interventions Met yes, treatment indicated   PT Diagnosis Functional weakness and impaired balance   Influenced by the following impairments Impaired static and dynamic balance, weakness in B LE, sensory impairments in B LE   Functional limitations due to impairments Impaired gait, transfers, and limitation of ADLs (house cleaning) and recreational activities (cooking)   Clinical Presentation Evolving/Changing   Clinical Presentation Rationale Progressive neurological disease, h/o falls   Clinical Decision Making (Complexity) Moderate complexity   Therapy Frequency 2 times/Week   Predicted Duration of Therapy Intervention (days/wks) 90 days   Risk & Benefits of therapy have been explained Yes   Patient, Family & other staff in agreement with plan of care Yes   Education Assessment   Preferred Learning Style Listening   Barriers to Learning No barriers   GOALS   PT Eval Goals 1;2;3;4;5   Goal 1   Goal Identifier mCTSIB   Goal Description Pt will be able to maintain balance for at least 15 sec on conditions 4 and 5 in order to improve safety and reduce risk of falls.   Goal Progress Baseline: condition 4 5.33 sec, condition 5 unable   Target Date 07/19/22   Goal 2   Goal Identifier 30 sec STS   Goal Description Pt will be able to perform at least 5 STS in 30 sec in order to improve independence of transfers.    Goal Progress Baseline: 2 reps from standard chair w/ UE at knees   Target Date 07/19/22   Goal 3   Goal Identifier DGI   Goal Description Pt will perform >20 on the DGI in order to reduce risk of falls   Goal Progress Baseline: 18   Target Date 07/19/22   Goal 4   Goal Identifier Gait speed   Goal Description Pt will demonstrate a gait speed >0.8 m/s in order to improve community ambulation   Goal Progress Baseline: 0.58 m/s   Target Date 07/19/22   Goal 5   Goal Identifier HEP   Goal Description Pt will demonstrate independence with HEP >3x/wk in order to improve self management of impairments   Target Date 07/19/22   Total Evaluation Time   PT Eval, Moderate Complexity Minutes (66049) 45

## 2022-05-15 PROBLEM — F32.1 MODERATE MAJOR DEPRESSION (H): Status: ACTIVE | Noted: 2022-05-15

## 2022-05-15 NOTE — PATIENT INSTRUCTIONS
Labs as ordered  Continue current medications.  If willing to start future medication therapy for depression, then contact clinic.  Continue working with mental health therapist/counselor  I encourage you to stop all smoking.  If  willing to quit in the future,  contact the clinic if interested in prescription therapy (Chantix/Buproprion) or contact  [facility] [facility] toll-free number (1-441-QUIT-NOW) or through the internet  ([email or web address]) for free nicotine supplementation treatment and/or counseling  Increase nonsugar starchy food and protein intake in diet and monitor weight.  If not improving over the next 3 to 4 weeks and lab/x-ray unremarkable, then inform physician and will order imaging of the abdomen  If labs unremarkable for causes of neuropathy in the hands and feet, will get EMG study of the extremity nerves and refer to neurology  Flu vaccine

## 2022-07-18 DIAGNOSIS — K21.00 GASTROESOPHAGEAL REFLUX DISEASE WITH ESOPHAGITIS WITHOUT HEMORRHAGE: ICD-10-CM

## 2022-07-20 NOTE — TELEPHONE ENCOUNTER
Routing refill request to provider for review/approval because:  PPI Protocol Failed 07/18/2022 08:57 AM   Protocol Details  Not on Clopidogrel (unless Pantoprazole ordered)    No diagnosis of osteoporosis on record

## 2022-08-18 DIAGNOSIS — E78.5 HYPERLIPIDEMIA LDL GOAL <70: ICD-10-CM

## 2022-08-19 DIAGNOSIS — I70.229 CRITICAL LOWER LIMB ISCHEMIA (H): ICD-10-CM

## 2022-08-19 RX ORDER — ROSUVASTATIN CALCIUM 40 MG/1
40 TABLET, COATED ORAL AT BEDTIME
Qty: 90 TABLET | Refills: 0 | Status: SHIPPED | OUTPATIENT
Start: 2022-08-19 | End: 2022-12-16

## 2022-08-19 RX ORDER — GABAPENTIN 100 MG/1
CAPSULE ORAL
Qty: 90 CAPSULE | Refills: 0 | Status: SHIPPED | OUTPATIENT
Start: 2022-08-19 | End: 2022-09-27

## 2022-09-06 DIAGNOSIS — I10 ESSENTIAL HYPERTENSION: ICD-10-CM

## 2022-09-07 RX ORDER — AMLODIPINE BESYLATE 5 MG/1
5 TABLET ORAL 2 TIMES DAILY
Qty: 180 TABLET | Refills: 0 | Status: SHIPPED | OUTPATIENT
Start: 2022-09-07 | End: 2022-12-27

## 2022-09-07 NOTE — TELEPHONE ENCOUNTER
Medication refilled for 3 months.  Patient due for follow-up with me.  Call patient and schedule a follow-up appointment with me in clinic sometime in the next 3 months

## 2022-09-07 NOTE — TELEPHONE ENCOUNTER
Routing refill request to provider for review/approval because:  Last Rx from hospital provider  Sandra DANIELS RN

## 2022-09-09 ENCOUNTER — TELEPHONE (OUTPATIENT)
Dept: OTHER | Facility: CLINIC | Age: 62
End: 2022-09-09

## 2022-09-14 ENCOUNTER — TELEPHONE (OUTPATIENT)
Dept: OTHER | Facility: CLINIC | Age: 62
End: 2022-09-14

## 2022-09-14 NOTE — TELEPHONE ENCOUNTER
Returned call to patient.    Patient reports for the last couple of weeks she has a bump under the jaw line, on the right side of her face/neck.  She reports is not painful.  Pt denies any issues with eating, drinking, swallowing, breathing.    Advised patient to contact PCP office to discuss further to be further evaluated.  Patient verbalized understanding and had no further questions.      Pt is already scheduled for follow up in October with Dr. Lozano.    Alaina Yanez, ESTHERN, RN-[facility] [place]

## 2022-09-27 DIAGNOSIS — I70.229 CRITICAL LOWER LIMB ISCHEMIA (H): ICD-10-CM

## 2022-09-27 RX ORDER — GABAPENTIN 100 MG/1
100 CAPSULE ORAL 3 TIMES DAILY
Qty: 90 CAPSULE | Refills: 3 | Status: SHIPPED | OUTPATIENT
Start: 2022-09-27 | End: 2023-03-02

## 2022-09-27 NOTE — TELEPHONE ENCOUNTER
Last Written Prescription Date:  8/19/22  Last Fill Quantity: 90,  # refills: 0   Last office visit: 3/3/2022 with prescribing provider:  Kaylee Liu NP   Future Office Visit: 10/13/22 with Dr. Lozano.     Next 5 appointments (look out 90 days)    Oct 13, 2022  9:45 AM  Return Visit with Chadwick Lozano  [facility] [place] ([facility] ) [address] [id number] [phone]   Dec 09, 2022  2:00 PM  (Arrive by 1:40 PM)  Provider Visit with Vasquez Benoit MD  [facility] ([facility] ) [address] [place] [id number] [phone]           Requested Prescriptions   Pending Prescriptions Disp Refills     gabapentin (NEURONTIN) 100 MG capsule 90 capsule 3     Sig: Take 1 capsule (100 mg) by mouth 3 times daily       There is no refill protocol information for this order        Routing to Kaylee Liu NP for approval.     ASHLEY Johnson, RN  [facility]  Office:  [phone] Fax: [phone]

## 2022-10-11 NOTE — PROGRESS NOTES
[place] [facility]    Shirley Hendricks comes to see me today for vascular follow-up.  She is undergone multiple vascular procedures: Aortobifemoral bypass graft with left femoral to popliteal in situ bypass graft has been functioning well.   Right femoral graft limb to above-knee popliteal cadaveric SFA bypass.  Native distal above-knee popliteal artery occluded treated with angioplasty but recurrent stenosis.  Bypass graft from the distal cadaveric graft to below-knee popliteal with a left arm cephalic vein failed.  Redo bypass with cadaveric SFA performed 2/15/2022 with the distal anastomosis going into the distal below-knee popliteal artery onto the tibial peroneal trunk with primary AT runoff.  Doing very well on follow-up 3/3/2022    PMH: Medications: Zestril, Coreg, Norvasc, Crestor, Prolia, Neurontin, Prilosec, aspirin, Plavix, Breo Ellipta inhaler m     Medical: Hypertension    COPD    GERD    Hyperlipidemia on statin--last LDL=61     Charcot-Breonna-Tooth disease    No history of diabetes with A1c= 5.3       Left CEA 6/8/2020   Right CEA 5/11/2016    Stable carotid duplex 11/18/2021     ( 60% Right CCA stenosis)    History of cigar smoking--still 1/2 pack daily.    Has a firm nodule under right mandible that is unchanged for years    Notes mild right ankle dependent edema    Exam: Alert and appropriate.  Normal affect.   Blood pressure 130/73 left arm.  Pulse 49   Well-healed bilateral carotid incisions    5 mL subcutaneous nodule right submandibular Chest= clear   Cardiovascular= regular rate   Strong pulses in both grafts and DP with good Doppler High arches consistent with CMT disease   No swelling.  No ulcers    Graft duplex today reveals its patent.  This includes the original segment and the reduced distal vein graft to the tibial peroneal trunk.  Elevated velocities in native artery at 2.8 mm at the very distal anastomosis.    IMPRESSION: #1.  Patent right leg bypass graft.  Some  concerns about the elevated distal velocities but still very adequate blood flow with palpable DP pulse.  Continue on anticoagulation with aspirin/Plavix.  Repeat duplex in January 2022.       #2.  Patent left leg bypass graft.  Duplex January 2022     #3.  Bilateral CEA.  Duplex due January 2022     #4.  Again encouraged to quit smoking but very unlikely.    Over 20 minutes with patient today      Chadwick Lozano

## 2022-10-13 ENCOUNTER — OFFICE VISIT (OUTPATIENT)
Dept: OTHER | Facility: CLINIC | Age: 62
End: 2022-10-13
Attending: SURGERY
Payer: COMMERCIAL

## 2022-10-13 ENCOUNTER — HOSPITAL ENCOUNTER (OUTPATIENT)
Dept: ULTRASOUND IMAGING | Facility: CLINIC | Age: 62
Discharge: HOME | End: 2022-10-13
Attending: SURGERY
Payer: COMMERCIAL

## 2022-10-13 VITALS — OXYGEN SATURATION: 100 % | SYSTOLIC BLOOD PRESSURE: 130 MMHG | HEART RATE: 49 BPM | DIASTOLIC BLOOD PRESSURE: 73 MMHG

## 2022-10-13 DIAGNOSIS — I73.9 PAD (PERIPHERAL ARTERY DISEASE) (H): Primary | ICD-10-CM

## 2022-10-13 DIAGNOSIS — E78.5 HYPERLIPIDEMIA LDL GOAL <70: ICD-10-CM

## 2022-10-13 DIAGNOSIS — I73.9 PAD (PERIPHERAL ARTERY DISEASE) (H): ICD-10-CM

## 2022-10-13 DIAGNOSIS — I65.23 BILATERAL CAROTID ARTERY STENOSIS: ICD-10-CM

## 2022-10-13 DIAGNOSIS — Z98.890 HISTORY OF BILATERAL CAROTID ENDARTERECTOMY: ICD-10-CM

## 2022-10-13 PROCEDURE — 93926 LOWER EXTREMITY STUDY: CPT | Mod: RT

## 2022-10-13 PROCEDURE — 99213 OFFICE O/P EST LOW 20 MIN: CPT | Performed by: SURGERY

## 2022-10-13 PROCEDURE — G0463 HOSPITAL OUTPT CLINIC VISIT: HCPCS

## 2022-10-13 NOTE — NURSING NOTE
10/13/22 right lower extremity US:          Doppler tracings per Dr. Lozano, taken 10/13/22:        Alaina Yanez, BSN, RN-[facility] [place]

## 2022-10-13 NOTE — PROGRESS NOTES
Patient is here to discuss follow up.    /73 (BP Location: Left arm, Patient Position: Chair, Cuff Size: Adult Regular)   Pulse (!) 49   SpO2 100%     Questions patient would like addressed today are: N/A.    Refills are needed: No    Has homecare services and agency name:  Isa Kwon

## 2022-10-23 ENCOUNTER — HEALTH MAINTENANCE LETTER (OUTPATIENT)
Age: 62
End: 2022-10-23

## 2022-10-30 DIAGNOSIS — I73.9 PERIPHERAL VASCULAR DISEASE (H): ICD-10-CM

## 2022-10-30 RX ORDER — CLOPIDOGREL BISULFATE 75 MG/1
75 TABLET ORAL DAILY
Qty: 90 TABLET | Refills: 3 | Status: ON HOLD | OUTPATIENT
Start: 2022-10-30 | End: 2023-10-04

## 2022-11-11 DIAGNOSIS — J44.9 CHRONIC OBSTRUCTIVE PULMONARY DISEASE, UNSPECIFIED COPD TYPE (H): ICD-10-CM

## 2022-11-11 RX ORDER — FLUTICASONE FUROATE AND VILANTEROL TRIFENATATE 200; 25 UG/1; UG/1
POWDER RESPIRATORY (INHALATION)
Qty: 120 EACH | Refills: 11 | Status: SHIPPED | OUTPATIENT
Start: 2022-11-11 | End: 2023-12-15

## 2022-11-11 NOTE — TELEPHONE ENCOUNTER
Routing refill request to provider for review/approval because:  Patient has no ACT value on file.     Justice L. Phoenix, RN

## 2022-11-30 ENCOUNTER — TELEPHONE (OUTPATIENT)
Dept: OTHER | Facility: CLINIC | Age: 62
End: 2022-11-30

## 2022-11-30 NOTE — TELEPHONE ENCOUNTER
Patient is currently scheduled for the following:  Future Appointments   Date Time Provider Department Center   [facility]   1/19/2023  8:45 AM [address]   1/19/2023 10:00 AM Chadwick Lozano MD [facility]     Per Dr. Lozano, he would like patient to move up imaging and follow up appointment to next available.    Routing to scheduling to contact patient to coordinate earlier appointments.    Alaina Yanez, ESTHERN, RN-[facility] [place]

## 2022-12-01 NOTE — TELEPHONE ENCOUNTER
Left voicemail with instructions for patient to call back to schedule their appointment(s)    December 1, 2022 , 3:43 PM

## 2022-12-02 NOTE — TELEPHONE ENCOUNTER
Patient's 01/19/23 Ultrasounds and Dr Lozano appointment has been moved up to 12/22/22. Patient needs an early morning appointment.

## 2022-12-09 ENCOUNTER — ANCILLARY PROCEDURE (OUTPATIENT)
Dept: GENERAL RADIOLOGY | Facility: CLINIC | Age: 62
End: 2022-12-09
Attending: INTERNAL MEDICINE
Payer: COMMERCIAL

## 2022-12-09 ENCOUNTER — OFFICE VISIT (OUTPATIENT)
Dept: INTERNAL MEDICINE | Facility: CLINIC | Age: 62
End: 2022-12-09
Payer: COMMERCIAL

## 2022-12-09 VITALS
HEIGHT: 62 IN | DIASTOLIC BLOOD PRESSURE: 76 MMHG | SYSTOLIC BLOOD PRESSURE: 138 MMHG | OXYGEN SATURATION: 98 % | HEART RATE: 59 BPM | WEIGHT: 113.9 LBS | BODY MASS INDEX: 20.96 KG/M2 | TEMPERATURE: 97.7 F

## 2022-12-09 DIAGNOSIS — R59.0 CERVICAL ADENOPATHY: ICD-10-CM

## 2022-12-09 DIAGNOSIS — F17.200 TOBACCO USE DISORDER: ICD-10-CM

## 2022-12-09 DIAGNOSIS — R59.0 CERVICAL ADENOPATHY: Primary | ICD-10-CM

## 2022-12-09 DIAGNOSIS — L30.9 DERMATITIS: ICD-10-CM

## 2022-12-09 LAB
ALBUMIN SERPL BCG-MCNC: 3.9 G/DL (ref 3.5–5.2)
ALP SERPL-CCNC: 80 U/L (ref 35–104)
ALT SERPL W P-5'-P-CCNC: 22 U/L (ref 10–35)
ANION GAP SERPL CALCULATED.3IONS-SCNC: 13 MMOL/L (ref 7–15)
AST SERPL W P-5'-P-CCNC: 24 U/L (ref 10–35)
BILIRUB SERPL-MCNC: 0.2 MG/DL
BUN SERPL-MCNC: 13.2 MG/DL (ref 8–23)
CALCIUM SERPL-MCNC: 8.9 MG/DL (ref 8.8–10.2)
CHLORIDE SERPL-SCNC: 98 MMOL/L (ref 98–107)
CREAT SERPL-MCNC: 0.7 MG/DL (ref 0.51–0.95)
DEPRECATED HCO3 PLAS-SCNC: 21 MMOL/L (ref 22–29)
ERYTHROCYTE [DISTWIDTH] IN BLOOD BY AUTOMATED COUNT: 14.6 % (ref 10–15)
ERYTHROCYTE [SEDIMENTATION RATE] IN BLOOD BY WESTERGREN METHOD: 19 MM/HR (ref 0–30)
GFR SERPL CREATININE-BSD FRML MDRD: >90 ML/MIN/1.73M2
GLUCOSE SERPL-MCNC: 88 MG/DL (ref 70–99)
HCT VFR BLD AUTO: 39.2 % (ref 35–47)
HGB BLD-MCNC: 12.9 G/DL (ref 11.7–15.7)
LDH SERPL L TO P-CCNC: 189 U/L (ref 0–250)
MCH RBC QN AUTO: 28.9 PG (ref 26.5–33)
MCHC RBC AUTO-ENTMCNC: 32.9 G/DL (ref 31.5–36.5)
MCV RBC AUTO: 88 FL (ref 78–100)
PLATELET # BLD AUTO: 222 10E3/UL (ref 150–450)
POTASSIUM SERPL-SCNC: 4.7 MMOL/L (ref 3.4–5.3)
PROT SERPL-MCNC: 6.9 G/DL (ref 6.4–8.3)
RBC # BLD AUTO: 4.46 10E6/UL (ref 3.8–5.2)
SODIUM SERPL-SCNC: 132 MMOL/L (ref 136–145)
WBC # BLD AUTO: 5 10E3/UL (ref 4–11)

## 2022-12-09 PROCEDURE — 83615 LACTATE (LD) (LDH) ENZYME: CPT | Performed by: INTERNAL MEDICINE

## 2022-12-09 PROCEDURE — 71046 X-RAY EXAM CHEST 2 VIEWS: CPT | Mod: TC | Performed by: RADIOLOGY

## 2022-12-09 PROCEDURE — 85652 RBC SED RATE AUTOMATED: CPT | Performed by: INTERNAL MEDICINE

## 2022-12-09 PROCEDURE — 99214 OFFICE O/P EST MOD 30 MIN: CPT | Performed by: INTERNAL MEDICINE

## 2022-12-09 PROCEDURE — 85027 COMPLETE CBC AUTOMATED: CPT | Performed by: INTERNAL MEDICINE

## 2022-12-09 PROCEDURE — 36415 COLL VENOUS BLD VENIPUNCTURE: CPT | Performed by: INTERNAL MEDICINE

## 2022-12-09 PROCEDURE — 80053 COMPREHEN METABOLIC PANEL: CPT | Performed by: INTERNAL MEDICINE

## 2022-12-09 NOTE — PROGRESS NOTES
ASSESSMENT:   1. Cervical adenopathy  Feels like firm nontender lymph nodes left neck. Smoker.  Labs as ordered.  Will CT neck tissue and chest x-ray.  Spoke with radiology.  Patient does have a contrast allergy and has been able to tolerate contrast with steroids/Benadryl prep but radiology feels CT can be done without contrast for lymph node imaging.  Further management per results  - XR Chest 2 Views; Future  - CT Soft Tissue Neck w/o Contrast; Future  - CBC with platelets; Future  - Erythrocyte sedimentation rate auto; Future  - Comprehensive metabolic panel; Future  - Lactate Dehydrogenase; Future  - CBC with platelets  - Erythrocyte sedimentation rate auto  - Comprehensive metabolic panel  - Lactate Dehydrogenase    2. Tobacco use disorder  Imaging as ordered.  Counseled regarding smoking cessation.  Patient declines at this time  - XR Chest 2 Views; Future  - CT Soft Tissue Neck w/o Contrast; Future    3. Dermatitis  Abdominal skin changes appear consistent with tinea versus contact dermatitis.  Left ear outer region and canal consistent with eczema.  We will treat abdominal area with combination clotrimazole/1% hydrocortisone and treat the ear area with only hydrocortisone      PLAN:   Chest Xray - [place]  Labs as ordered  CT neck without contrast at [facility]. They will call to schedule or you may call  [phone]  Clotrimazole/Lotrimin antifungal cream. Apply to affected skin rash of abdomen twice a day along with  1% Hydrocortisone  steroid cream mixed together by hand/finger until rash resolves.    May also use steroid cream to ear skin twice a day until resolved  I encourage you to stop all smoking.  If  willing to quit in the future,  contact the clinic if interested in prescription therapy (Chantix/Buproprion) or contact  [facility] [facility] toll-free number (2-413-QUIT-NOW) or through the internet  ([email or web address].[email or web address]) for free nicotine supplementation treatment and/or counseling      (Chart  documentation was completed, in part, with [employer] voice-recognition software. Even though reviewed, some grammatical, spelling, and word errors may remain.)    Vasquez Benoit MD  Internal Medicine Department  [facility] Watson is a 62 year old, presenting for the following health issues:  Mass and Ear Problem    Lump in neck under chin  HPI     Concern -  Lump under chin and ear irritation  Onset: 6 months ago for lump and months for ear   Description: started out pea-sixed-gotten bigger as time goes on-  ear very tender when laying on that side, itching, crusting, bleeding, and drainage  Intensity: mild for lump and, severe for ear  Progression of Symptoms:  worsening, same and constant  Accompanying Signs & Symptoms: see description  Previous history of similar problem: none  Precipitating factors:        Worsened by: none  Alleviating factors:        Improved by: none  Therapies tried and outcome: hot compress for lump      Most recent lab results reviewed with pt.       1/2-1 ppd cgs   Occ trouble with solid foods causing pain with swallowing  but goes down OK.  OK with  Liquids. NO vomiting. Hx CMT   Occ nasal congestion in AM that is clear. No sinus pain    Occ cough in AM that is tan  color   No shortness of breath today. No F/C.   No F/C.   Occasional pain left ear canal  Has noticed lump under chin right side for about 6 months.  Slowly increasing in size.  Not causing pain at the site of the lump.  No trauma.  No hx falls recently. Stable gait limited by CMT. ambulating without assist device  Has rash left lower abdomen just underneath the underwear top line and also left ear external canal.  Denies pain with the rash    Additional ROS:   Constitutional, HEENT, Cardiovascular, Pulmonary, GI and , Neuro, MSK and Psych review of systems/symptoms are otherwise negative or unchanged from previous, except as noted above.      OBJECTIVE:  /76   Pulse 59   " Temp 97.7  F (36.5  C) (Tympanic)   Ht 1.575 m (5' 2\")   Wt 51.7 kg (113 lb 14.4 oz)   LMP  (LMP Unknown)   SpO2 98%   Breastfeeding No   BMI 20.83 kg/m     Estimated body mass index is 20.83 kg/m  as calculated from the following:    Height as of this encounter: 1.575 m (5' 2\").    Weight as of this encounter: 51.7 kg (113 lb 14.4 oz).  Eye: PERRL, EOMI  HENT: ear canals and TM's normal and nose and mouth without ulcers or lesions   Neck:   Thyroid normal to palpation. Bilateral carotid bruit.  Approximate 1 centimeter size nontender mass/lymph node palpable just under the skin below right jaw angle and also lower right neck over the SCM area. No left side mass/adenopathy palpable  Pulm: Lungs clear to auscultation with slight prolonged expiratory phase.  No current wheeze  CV: Regular rates and rhythm  GI: Soft, nontender, Normal active bowel sounds, No hepatosplenomegaly or masses palpable  Ext: Peripheral pulses intact. Trace BLE ankle  edema.   Skin: Left lower abdomen with rash along the underwear line on top that is dry and slightly erythematous without vesicles.  Does not follow dermatomal distribution along the back or side and nontender to palpation so zoster unlikely.  Left ear external canal and just outside the ear with dry erythematous skin consistent with eczema reaction              "

## 2022-12-09 NOTE — PATIENT INSTRUCTIONS
Chest Xray - [place]  Labs as ordered  CT neck without contrast at [facility]. They will call to schedule or you may call  [phone]  Clotrimazole/Lotrimin antifungal cream. Apply to affected skin rash of abdomen twice a day along with  1% Hydrocortisone  steroid cream mixed together by hand/finger until rash resolves.   May also use steroid cream to ear skin twice a day until resolved  I encourage you to stop all smoking.  If  willing to quit in the future,  contact the clinic if interested in prescription therapy (Chantix/Buproprion) or contact  [facility] [facility] toll-free number (7-153-QUIT-NOW) or through the internet  (quit"[email or web address]) for free nicotine supplementation treatment and/or counseling

## 2022-12-11 PROBLEM — I70.229 CRITICAL LOWER LIMB ISCHEMIA (H): Status: RESOLVED | Noted: 2021-06-14 | Resolved: 2022-12-11

## 2022-12-11 PROBLEM — K56.609 SBO (SMALL BOWEL OBSTRUCTION) (H): Status: RESOLVED | Noted: 2021-08-10 | Resolved: 2022-12-11

## 2022-12-11 PROBLEM — F32.1 MODERATE MAJOR DEPRESSION (H): Status: RESOLVED | Noted: 2022-05-15 | Resolved: 2022-12-11

## 2022-12-13 ENCOUNTER — ANCILLARY PROCEDURE (OUTPATIENT)
Dept: CT IMAGING | Facility: CLINIC | Age: 62
End: 2022-12-13
Attending: INTERNAL MEDICINE
Payer: COMMERCIAL

## 2022-12-13 DIAGNOSIS — F17.200 TOBACCO USE DISORDER: ICD-10-CM

## 2022-12-13 DIAGNOSIS — R59.0 CERVICAL ADENOPATHY: ICD-10-CM

## 2022-12-13 PROCEDURE — 70490 CT SOFT TISSUE NECK W/O DYE: CPT

## 2022-12-16 DIAGNOSIS — E78.5 HYPERLIPIDEMIA LDL GOAL <70: ICD-10-CM

## 2022-12-16 RX ORDER — ROSUVASTATIN CALCIUM 40 MG/1
40 TABLET, COATED ORAL AT BEDTIME
Qty: 90 TABLET | Refills: 1 | Status: SHIPPED | OUTPATIENT
Start: 2022-12-16 | End: 2023-06-23

## 2022-12-19 NOTE — PROGRESS NOTES
[place] [facility]    Shirley Hendricks returns for follow-up of her right leg cadaveric SFA/cephalic vein bypass to below-knee popliteal artery failed.  Redo bypass with cadaveric SFA with a distal anastomosis on to the below-knee popliteal artery and tibial peroneal trunk with primary 2 runoff on 2/15/2022.  Duplex 10/13/2022 with widely patent graft.  However elevated velocities in the native artery at 2.8 mm in diameter at the distal anastomosis.  She still has a strongly palpable DP pulse.  Felt short-term duplex was indicated.      VASCULAR HISTORY: #1.  Aortobifemoral bypass graft with left femoral to popliteal in situ     #2.  5/11/2016 right CEA     #3.  6/8/2020 left CEA     #4.  Right leg bypass graft most recent 2/15/2022    PMH: Medications: Lisinopril, Coreg, Norvasc, Crestor, Neurontin, aspirin, Plavix, inhaler    Medical: Hypertension    COPD with ongoing smoking--1/2 pack/day    Hyperlipidemia on statin    Charcot-Breonna-Tooth disease       12/9/2022 laboratory: K= 4.7   SCr= 0.70   Glucose= 88 Hgb= 12.9    ROS: Noted pain enlarged submandibular lymph node.  CT scan 12/13/2022 with a 1.5 cm prominent lymph node that was very nonspecific.    She is has noted for some period of time a small lump in her left groin (she is very thin) but feels that this has increased.  No signs at all of infection with no erythema, tenderness, fevers.      Overall her legs with very good.    Exam: Alert and appropriate.  Normal affect.  Ambulates easily.   Blood pressure 142/76.  Pulse 53   HEENT= enlarged lymph node.  Well-healed carotid incisions.    Chest= clear   Cardiovascular= regular rate    Abdomen= thin, soft, nontender   Extremities= palpable left groin pseudoaneurysm.  Normal skin.    +3 palpable pulses in both bypass grafts and DP.      Strong biphasic Doppler both DP's.           Carotid duplex reveals a widely patent left CEA.  Right CEA does have a stenosis in the distal common carotid  artery which is felt to be 67% of the diameter compared to 60% previously.  ICA itself is widely patent.      There is a left femoral pseudoaneurysm just beyond the aortobifemoral graft and before the in situ graft.  This measures 3.4 x 2.9 x 2.3 cm.  The graft itself is widely patent.      On the right side the cadaveric graft is widely patent.  Bypass to the below-knee popliteal artery has an increase stenosis now down to 1.7 mm.  Outflow was difficult to determine.        IMPRESSION: #1.  Patent right and left CEA with stable right carotid bulb stenosis which is not hemodynamically significant.  Follow-up duplex in 1 year.       #2.  Left femoral pseudoaneurysm not previously appreciated but may have been present and gradually increasing since her last evaluation of this area.  This does require open surgical repair and this is been discussed with patient.     #3.  Worsening stenosis of vein jump graft down to the tibial peroneal trunk.  Still has a palpable DP pulse with good Doppler but concerning.  Would recommend the time of the left femoral pseudoaneurysm repair that we performed in our combined room 50 a right leg angiogram to evaluate this further and treat if necessary.  This will be slightly more difficult due to the aorto femoral graft.      This is been discussed with the patient.  She would like to have this taken care of as soon as possible and possibly this coming week.  She did hold her Plavix for 4 days.    Over 25 minutes with patient today.    Chadwick Lozano MD   This note was created using Dragon voice recognition software which may result in transcription errors.

## 2022-12-22 ENCOUNTER — TELEPHONE (OUTPATIENT)
Dept: OTHER | Facility: CLINIC | Age: 62
End: 2022-12-22

## 2022-12-22 ENCOUNTER — OFFICE VISIT (OUTPATIENT)
Dept: OTHER | Facility: CLINIC | Age: 62
End: 2022-12-22
Attending: SURGERY
Payer: COMMERCIAL

## 2022-12-22 ENCOUNTER — HOSPITAL ENCOUNTER (OUTPATIENT)
Dept: ULTRASOUND IMAGING | Facility: CLINIC | Age: 62
Discharge: HOME | End: 2022-12-22
Attending: SURGERY
Payer: COMMERCIAL

## 2022-12-22 ENCOUNTER — TELEPHONE (OUTPATIENT)
Dept: INTERNAL MEDICINE | Facility: CLINIC | Age: 62
End: 2022-12-22

## 2022-12-22 VITALS — HEART RATE: 53 BPM | SYSTOLIC BLOOD PRESSURE: 142 MMHG | OXYGEN SATURATION: 100 % | DIASTOLIC BLOOD PRESSURE: 76 MMHG

## 2022-12-22 DIAGNOSIS — T81.718A PSEUDOANEURYSM OF FEMORAL ARTERY FOLLOWING PROCEDURE (H): ICD-10-CM

## 2022-12-22 DIAGNOSIS — I65.23 BILATERAL CAROTID ARTERY STENOSIS: ICD-10-CM

## 2022-12-22 DIAGNOSIS — Z98.890 HISTORY OF BILATERAL CAROTID ENDARTERECTOMY: ICD-10-CM

## 2022-12-22 DIAGNOSIS — I73.9 PAD (PERIPHERAL ARTERY DISEASE) (H): Primary | ICD-10-CM

## 2022-12-22 DIAGNOSIS — I70.391 ATHEROSCLEROSIS OF BYPASS GRAFT OF RIGHT LOWER EXTREMITY WITH OTHER CLINICAL MANIFESTATION (H): ICD-10-CM

## 2022-12-22 DIAGNOSIS — Z91.041 CONTRAST MEDIA ALLERGY: ICD-10-CM

## 2022-12-22 DIAGNOSIS — Z98.890 S/P CAROTID ENDARTERECTOMY: ICD-10-CM

## 2022-12-22 DIAGNOSIS — I72.4 PSEUDOANEURYSM OF FEMORAL ARTERY FOLLOWING PROCEDURE (H): ICD-10-CM

## 2022-12-22 DIAGNOSIS — I73.9 PAD (PERIPHERAL ARTERY DISEASE) (H): ICD-10-CM

## 2022-12-22 PROCEDURE — G0463 HOSPITAL OUTPT CLINIC VISIT: HCPCS | Mod: 25 | Performed by: SURGERY

## 2022-12-22 PROCEDURE — 93925 LOWER EXTREMITY STUDY: CPT

## 2022-12-22 PROCEDURE — 99214 OFFICE O/P EST MOD 30 MIN: CPT | Performed by: SURGERY

## 2022-12-22 PROCEDURE — 93880 EXTRACRANIAL BILAT STUDY: CPT

## 2022-12-22 RX ORDER — METHYLPREDNISOLONE 16 MG/1
TABLET ORAL
Qty: 4 TABLET | Refills: 0 | Status: SHIPPED | OUTPATIENT
Start: 2022-12-22 | End: 2023-03-28

## 2022-12-22 RX ORDER — DIPHENHYDRAMINE HCL 25 MG
TABLET ORAL
Qty: 2 TABLET | Refills: 0 | Status: ON HOLD | OUTPATIENT
Start: 2022-12-22 | End: 2024-05-15

## 2022-12-22 NOTE — TELEPHONE ENCOUNTER
Case request: *[id number]*REPAIR LEFT FEMORAL PSEUDOANEURYSM. RIGHT LEG ANGIO    Case ID [id number]    Spoke with Shirley and she is scheduled for 12/28/22 and has all details.

## 2022-12-22 NOTE — TELEPHONE ENCOUNTER
Patient needs pre op for procedure on 12/28/2022.     She was just seen by you 12/09/2022.     Can she have  the visit amended for a pre-op ?     Zoraida Clemons RN  [facility]

## 2022-12-22 NOTE — PATIENT INSTRUCTIONS
Shirley Hendricks,    Your visit to [facility] for your surgery or procedure is coming soon and we look forward to seeing you! This friendly reminder and pre-procedure checklist will help to ensure your procedure/surgery goes smoothly and meets your expectations. At [facility], our goal is to provide you with a great patient experience and to deliver genuine, professional care to every patient.     Please complete all the steps in advance of your visit. If you have any questions about the items listed below, please give our office a call at [phone].  IF YOU NEED TO RESCHEDULE OR CANCEL YOUR PROCEDURE FOR ANY REASON PLEASE CALL THE CLINIC AS SOON AS POSSIBLE.    Procedure: Right Leg angiogram     Procedure Date :  TBD    Procedure Time :  TBD    Arrival Time: TBD      2 week Post Procedure Appointment with IR and also [facility].    6 week follow up with Dr. Lozano, imaging TBD.    Ordering Provider:  Dr. Jacqueline Lozano    Performing Provider:  TBD    Procedure Location: [facility]:  [address] [place] [id number] (Fax: [phone])    If you take blood thinners, see specific instructions below:  Plavix hold for four days prior    PLEASE DO NOT STOP YOUR ASPIRIN OR PLAVIX UNLESS SPECIFICALLY DIRECTED BY THE VASCULAR SURGEON TO STOP!  In most cases Vascular surgeons want you to continue these. This is different from most NON vascular surgeries and may not be well known by your Primary Care Provider    Before the procedure  Prepare for the peripheral angiography as follows:     [] A Pre-op physical within 30 days of the procedure is required. You will need to set up an appointment with your primary care provider.  []Do not eat or drink anything after midnight before your procedure. If your provider says to take your normal medicines, swallow them with only small sips of water.  []Tell your healthcare provider about all medicines you take and any allergies  you may have.  []You MUST have a  to bring you home.  You will need to arrange for a family member or friend to drive you home.  []PLEASE BE SURE TO ADDRESS WITH YOUR PCP WHAT TO DO WITH YOUR INSULIN AND METFORMIN PRIOR TO YOUR ANGIOGRAM    What is Peripheral Angiography?    Peripheral angiography is an outpatient procedure that makes a  map  of the vessels (arteries) in your lower body, legs, and arms, using X-ray and dye.This map can show where blood flow may be blocked.    An angiogram is commonly performed under sedation with the use of local anesthesia.    The procedure usually starts with a needle put into the femoral (groin) artery. From one treatment site, areas all over the body can be treated.  After access is established, catheters (thin tubes) and wires are threaded through the arterial system to a specific area of interest or throughout the entire body.  As a contrast agent (iodine dye) is injected, X-ray images are taken to let your vascular surgeon view the flow of the dye and identify blockages. The surgeon can then choose the best mode of therapy for you - whether during or following the angiogram. This decision depends on your symptoms and the severity and characteristics of the blockages.  Two common therapies that can be provided during the angiogram are balloon angioplasty and stent placement.               Angioplasty can be used to open arterial blockages. Guided by X-ray, your vascular surgeon navigates through the blockage with a wire and introduces a special device equipped with an inflatable balloon. After positioning the balloon device across the blocked portion of the artery, the vascular surgeon inflates the balloon to expand the artery and compress the blockage. The balloon is then deflated and removed while keeping the wire in place across the area that has been treated. Next, contrast dye is injected to assess the result. Treatment is considered a success if blood flow is  improved and less than 30% of the blockage remains. If the vessel is still considerably narrowed, placing a stent may be the next step.    Stents are used to prop open an artery at the site of a narrowing. Stents are generally placed after balloon angioplasty when there is residual narrowing or insufficient blood flow in a treated vessel. Stents are considered a permanent implant and cannot be used if you have a metal allergy. Stents that are used in the leg are constructed of a nickel-titanium alloy (Nitinol), a memory-shaped metal. This alloy has a predetermined size and shape at body temperature and expands to this size and shape after being introduced through a catheter. These stents resist kinking and are flexible so that damage from activities that involve your legs is minimized.    If surgery is felt to be a better option, your vascular surgeon will obtain any additional X-ray images needed to plan a surgical bypass of the blocked vessel/s and will then conclude the angiogram.    During the procedure  You may get medicine through an IV (intravenous) line to relax you. You re given an injection to numb the insertion site. Then, a tiny skin cut (incision) is made near an artery in your groin.  Your provider inserts a thin tube (catheter) through the incision. He or she then threads the catheter into an artery while looking at a video monitor.  Contrast  dye  is injected into the catheter to confirm position. You may feel warmth or pressure in your legs and back. You lie still as X-rays are taken. The catheter is then taken out.    After the procedure  You ll be taken to a recovery area. A healthcare provider will apply pressure to the site for about 10 minutes. Your healthcare provider will tell you how long to lie down and keep the insertion site still. Your healthcare provider will discuss the results with you soon after the procedure.    Back at home  On the day you get home, don t drive, don t exercise,  avoid walking and taking stairs, and avoid bending and lifting. Your healthcare provider may give you other care instructions.    Call your healthcare provider right away if:  You notice a lump or bleeding at the insertion site  You feel pain at the insertion site  You become lightheaded or dizzy  You have leg pain or numbness  You do not urinate in 8 hours     Angiogram Procedure Discharge Instructions:     1. If you received sedation for your procedure: Do not drive or operate heavy machinery for the rest of the day.     2. Avoid strenuous activity for 72 hours (3 days):                        - Do not lift greater than 10 pounds.                        - Excessive exercise                        - Straining                        - Return to your normal activities as you tolerate after the 3 days restriction     3. Avoid tub baths, Jacuzzis, hot tubs and pools for 72 hours (3 days) or until puncture site is will healed.     4. You may shower beginning tomorrow. Do not scrub puncture site(s) until well healed, pat dry.     5. You can expect to return to work 1-2 days after your procedure - depending on the nature of your profession.     6. It is normal to have some tenderness and minimal swelling at puncture site. A small area of discoloration may be present. Tenderness typically subsides in 24-48 hours. A small knot may also be present at puncture site for 6-8 weeks, this can be a normal part of the healing process.     After the angiogram, if you:      1. Experience any bleeding or active swelling from puncture site: Lie down, firmly apply pressure to puncture site and CALL 9-1-1     2. Fever greater than 101 degrees Fahrenheit.     3. Redness, swelling, warmth to touch, or purulent (yellow/green/foul smelling) drainage from the puncture site.     4. Increasing pain, tenderness or swelling at puncture site OR of arm/leg near puncture site.     5. Feeling weak or faint.     6. Change in color, temperature, or  sensation of arm/leg where puncture was made.     Call us with any other questions or concerns after your procedure: [phone].    You will need to have an ultrasound 2-3 weeks after your angiogram and should be scheduled at the time of your follow up appt.  Further follow up will be based on ultrasound results. Typical follow up is every 3 months for the first year, then every 6 months to one year thereafter.     All invasive procedures can have complications. While the risk of an angiogram is low it is not zero. The most common complications are related to the arterial access site.      Risks/ Complications  Bruising is common.  You will likely have bruising (ecchymosis) where the artery was entered.    Pain and bleeding.  Less commonly, patients experience pain and bleeding that may include blood collecting under the skin (hematoma).    Blockage and leakage.  In rare cases, the access artery can become blocked. Infrequently, patients experience persistent leakage of blood where the artery was entered, which can result in the formation of a pseudoaneurysm--a blood-filled sac--that may require further treatment.  Other complications related to an angiogram include:   Allergic reaction to the iodine contrast dye, which can lead to the development of kidney failure.  Very rarely during balloon angioplasty and/or stent placement, part of the arterial blockage can break off (embolism) and travel to more distant arteries. This can worsen blood flow.    Patient Education Confirmation  Learner(s):patient  Method: Listening  Barriers to Learning:No Barrier  Outcome: Patient did verbalize understanding of above education.    Notify our office right away, if you have any changes in your health status, or if you develop a cold, flu, diarrhea, infection, fever or sore throat before your scheduled surgery date. We can be reached at [phone].  Monday-Friday 8 am-4:30 pm if you have any questions.   Thank you for choosing  [facility]  If you have any questions or need to reschedule your appointment, please call [phone]      Contrast Allergy  Criteria for Contrast Allergy:   You may need pretreatment if you are allergic to contrast dye.    No Pre-Treatment is Needed:  Anxiety, Headache, Pallor, Warmth (heat/flushing), Chills, Shaking, Sweats, Sneezing, Pulse change, Dizziness, Nasal Stuffiness, Cough, Altered Taste, Nausea/Vomiting  Use Non-Ionic Contrast, plus pre-treatment with steroids before injection:  Hives/Itching, Bronchospasm Wheezing, Significant Hypertension/Hypotension, Voice change, Tongue Swelling, Throat Tightness or Laryngeal Edema  Avoid Iodinated Contrast - Consider Un-Enhanced CT/MRI Scan:  Seizures after contrast, anaphylaxis, Cardiac Arrest   Pretreatment for Contrast Allergy:  Medrol 16 mg tablet, Take two tablets 12 (twelve) hours before procedure.   Medrol 16 mg tablet, Take two tablet 2s (two) hours before procedure.   If you have had an allergic reaction (hives) after being pre-treated with Medrol, you will also need to take Benadryl 25-50 mg; Take once between 30 minutes-2 hours before procedure. Over the counter Benadryl can be obtained at your local pharmacy.

## 2022-12-22 NOTE — PROGRESS NOTES
Patient is here to discuss recheck     BP (!) 142/76 (BP Location: Left arm, Patient Position: Chair, Cuff Size: Adult Small)   Pulse 53   LMP  (LMP Unknown)   SpO2 100%     Questions patient would like addressed today are: N/A.    Refills are needed: N/A    Has homecare services and agency name:  [facility]

## 2022-12-26 NOTE — TELEPHONE ENCOUNTER
Visit with me will not work as preop. Pt to see me tomorrow Tuesday at 8:00 for preop appt. Call pt and schedule appt

## 2022-12-27 ENCOUNTER — ANESTHESIA EVENT (OUTPATIENT)
Dept: SURGERY | Facility: CLINIC | Age: 62
DRG: 271 | End: 2022-12-27
Payer: COMMERCIAL

## 2022-12-27 ENCOUNTER — TELEPHONE (OUTPATIENT)
Dept: OTHER | Facility: CLINIC | Age: 62
End: 2022-12-27

## 2022-12-27 ENCOUNTER — OFFICE VISIT (OUTPATIENT)
Dept: INTERNAL MEDICINE | Facility: CLINIC | Age: 62
End: 2022-12-27
Payer: COMMERCIAL

## 2022-12-27 VITALS
HEIGHT: 62 IN | SYSTOLIC BLOOD PRESSURE: 130 MMHG | OXYGEN SATURATION: 98 % | BODY MASS INDEX: 20.98 KG/M2 | TEMPERATURE: 97.3 F | HEART RATE: 50 BPM | DIASTOLIC BLOOD PRESSURE: 73 MMHG | WEIGHT: 114 LBS

## 2022-12-27 DIAGNOSIS — Z91.041 ALLERGIC TO IV CONTRAST: ICD-10-CM

## 2022-12-27 DIAGNOSIS — I10 ESSENTIAL HYPERTENSION: ICD-10-CM

## 2022-12-27 DIAGNOSIS — E87.1 HYPONATREMIA: ICD-10-CM

## 2022-12-27 DIAGNOSIS — F17.200 TOBACCO USE DISORDER: ICD-10-CM

## 2022-12-27 DIAGNOSIS — I25.10 CORONARY ARTERY DISEASE INVOLVING NATIVE CORONARY ARTERY OF NATIVE HEART WITHOUT ANGINA PECTORIS: ICD-10-CM

## 2022-12-27 DIAGNOSIS — E78.5 HYPERLIPIDEMIA LDL GOAL <70: ICD-10-CM

## 2022-12-27 DIAGNOSIS — E04.1 THYROID NODULE: ICD-10-CM

## 2022-12-27 DIAGNOSIS — I72.9 PSEUDOANEURYSM (H): ICD-10-CM

## 2022-12-27 DIAGNOSIS — J44.9 CHRONIC OBSTRUCTIVE PULMONARY DISEASE, UNSPECIFIED COPD TYPE (H): ICD-10-CM

## 2022-12-27 DIAGNOSIS — Z01.818 PREOP GENERAL PHYSICAL EXAM: ICD-10-CM

## 2022-12-27 DIAGNOSIS — R59.9 ENLARGED LYMPH NODE: ICD-10-CM

## 2022-12-27 DIAGNOSIS — I73.9 PAD (PERIPHERAL ARTERY DISEASE) (H): ICD-10-CM

## 2022-12-27 DIAGNOSIS — G60.0 CHARCOT-MARIE-TOOTH DISEASE TYPE 1A: ICD-10-CM

## 2022-12-27 PROBLEM — Z98.890 CRITICAL ISCHEMIA OF EXTREMITY WITH HISTORY OF REVASCULARIZATION OF SAME EXTREMITY (H): Status: RESOLVED | Noted: 2021-06-24 | Resolved: 2022-12-27

## 2022-12-27 PROBLEM — I70.229 CRITICAL ISCHEMIA OF EXTREMITY WITH HISTORY OF REVASCULARIZATION OF SAME EXTREMITY (H): Status: RESOLVED | Noted: 2021-06-24 | Resolved: 2022-12-27

## 2022-12-27 LAB
ANION GAP SERPL CALCULATED.3IONS-SCNC: 14 MMOL/L (ref 7–15)
BUN SERPL-MCNC: 13.3 MG/DL (ref 8–23)
CALCIUM SERPL-MCNC: 9.4 MG/DL (ref 8.8–10.2)
CHLORIDE SERPL-SCNC: 99 MMOL/L (ref 98–107)
CREAT SERPL-MCNC: 0.78 MG/DL (ref 0.51–0.95)
DEPRECATED HCO3 PLAS-SCNC: 23 MMOL/L (ref 22–29)
GFR SERPL CREATININE-BSD FRML MDRD: 84 ML/MIN/1.73M2
GLUCOSE SERPL-MCNC: 83 MG/DL (ref 70–99)
POTASSIUM SERPL-SCNC: 4.4 MMOL/L (ref 3.4–5.3)
SODIUM SERPL-SCNC: 136 MMOL/L (ref 136–145)

## 2022-12-27 PROCEDURE — 93000 ELECTROCARDIOGRAM COMPLETE: CPT | Performed by: INTERNAL MEDICINE

## 2022-12-27 PROCEDURE — 80048 BASIC METABOLIC PNL TOTAL CA: CPT | Performed by: INTERNAL MEDICINE

## 2022-12-27 PROCEDURE — 99214 OFFICE O/P EST MOD 30 MIN: CPT | Performed by: INTERNAL MEDICINE

## 2022-12-27 PROCEDURE — 36415 COLL VENOUS BLD VENIPUNCTURE: CPT | Performed by: INTERNAL MEDICINE

## 2022-12-27 RX ORDER — AMLODIPINE BESYLATE 5 MG/1
5 TABLET ORAL 2 TIMES DAILY
Qty: 180 TABLET | Refills: 3 | Status: ON HOLD | OUTPATIENT
Start: 2022-12-27 | End: 2023-10-04

## 2022-12-27 ASSESSMENT — PATIENT HEALTH QUESTIONNAIRE - PHQ9
SUM OF ALL RESPONSES TO PHQ QUESTIONS 1-9: 0
10. IF YOU CHECKED OFF ANY PROBLEMS, HOW DIFFICULT HAVE THESE PROBLEMS MADE IT FOR YOU TO DO YOUR WORK, TAKE CARE OF THINGS AT HOME, OR GET ALONG WITH OTHER PEOPLE: NOT DIFFICULT AT ALL
SUM OF ALL RESPONSES TO PHQ QUESTIONS 1-9: 0

## 2022-12-27 ASSESSMENT — LIFESTYLE VARIABLES: TOBACCO_USE: 1

## 2022-12-27 ASSESSMENT — COPD QUESTIONNAIRES: COPD: 1

## 2022-12-27 NOTE — PROGRESS NOTES
[address] [address]  Phone: [phone]  Primary Provider: Vasquez Panda  Pre-op Performing Provider: VASQUEZ PANDA      PREOPERATIVE EVALUATION:  Today's date: 12/27/2022    Shirley Hendricks is a 62 year old female who presents for a preoperative evaluation.    Surgical Information:  Surgery/Procedure: REPAIR LEFT FEMORAL PSEUDOANEURYSM, RIGHT LEG ANGIOGRAM  Surgery Location: [facility]  Surgeon: Dr. Lozano  Surgery Date: 12/28/2022  Time of Surgery: 1:00 pm  Where patient plans to recover: At home with family  Fax number for surgical facility: Note does not need to be faxed, will be available electronically in Epic.    Type of Anesthesia Anticipated: General          Assessment & Plan     The proposed surgical procedure is considered INTERMEDIATE risk.    1. Preop general physical exam  Appears medically stable to proceed with surgery as scheduled  - EKG 12-lead complete w/read - Clinics    2. Pseudoaneurysm (H)    3. PAD (peripheral artery disease) (H)   See HPI.  Recent carotid duplex showed widely patent left CEA and right CEA pending stenosis of the distal common carotid artery felt to be 67% of the diameter compared to previous 60%.  Widely patent right ICA per vascular notes    4. Essential hypertension  Controlled  - amLODIPine (NORVASC) 5 MG tablet; Take 1 tablet (5 mg) by mouth 2 times daily  Dispense: 180 tablet; Refill: 3    5. Hyponatremia  Sodium normal at 136 today  - Basic metabolic panel; Future  - Basic metabolic panel    6. Coronary artery disease involving native coronary artery of native heart without angina pectoris  Denies chest pain with usual activity. EKG with nonspecific changes that does not appear ischemic in etiology, especially in absence of any chest pain with exertional activity with stationary pedaling 10-20 min 3x/day oftem    7. Charcot-Breonna-Tooth disease type 1A  Stable.  Denies recent  fall    8. Chronic obstructive pulmonary disease, unspecified COPD type (H)  Stable with current inhaler therapy    9. Tobacco use disorder  Smoking 1 pack of cigarettes per day.  Not interested in quitting despite strong MD recommendation    10. Hyperlipidemia LDL goal <70  On high-dose statin therapy.  Lipids at goal    11. Enlarged lymph node   Single mildly prominent right level IB lymph node measuring 1.5 cm long axis. This is nonspecific. No other cervical lymphadenopathy.  Will recheck lymph node size with ultrasound in 3 months.  If increasing in size, will get lymph node biopsy  - US Thyroid; Future     12. Thyroid nodule  Heterogeneous appearance of the thyroid gland likely representing underlying nodules seen on CT neck Dec 2022. Previous TSH normal. Will get thyroid U/S in 3 mos  - US Thyroid; Future    13.  Allergic to IV contrast  Pretreating with methylprednisolone and diphenhydramine       Risks and Recommendations:  The patient has the following additional risks and recommendations for perioperative complications:   - No identified additional risk factors other than previously addressed    Patient instructions    Approval given to proceed with proposed procedure, without further diagnostic evaluation..  No solid food after midnight prior to your  procedure. May have clear liquids up to 2 hours prior to the  procedure (11 am)   Remain offof Clopidogrel/Plavix and Aspirin prior to your  procedure to reduce risk of bleeding.  Start both meds  after surgery unless instructed differently  by Dr Lozano   May use Tylenol for pain control  between then and your procedure  Take  Amlodipine, Carvedilol, Gabapentin, Lisinopril, Omeprazole  the morning of surgery. Also use Breo inhaler as usual  Methylprednisolone.  Take two tablets (32mg) Medrol by mouth 12 hours before procedure and repeat two tablets (32mg) by mouth 2 hours before procedure to prevent contrast reaction. Surgery time is 1:00pm  Hold other  "prescription and over the counter medications/supplements the day of the  procedure. May resume usual medications/supplements after the procedure unless instructed otherwise by your surgeon   Ultrasound of the neck in 3 months to follow-up on lymph node size and to also assess possible thyroid nodules. Let me know earlier if noticing there \"lumps\" in neck  I encourage you to stop all smoking.  If  willing to quit in the future,  contact the clinic if interested in prescription therapy  or contact  [facility] [facility] toll-free number (6-738-QUIT-NOW) or through the internet  ([facility]nermn[facility]com) for free nicotine supplementation treatment and/or counseling    RECOMMENDATION:  APPROVAL GIVEN to proceed with proposed procedure, without further diagnostic evaluation.            Subjective     HPI related to upcoming procedure:    History of a small lump in the left groin that patient feels has increased in size.  No signs of infection in the area.  Imaging showed a left femoral pseudoaneurysm just beyond the aortobifemoral graft and before the in situ graft measuring 2.4 x 2.9 x 2.3 cm.  The graft itself was widely patent.  History of right lower extremity SFA/cephalic vein bypass to below-knee popliteal artery with failure requiring redo bypass with cadaveric SFA with a distal anastomosis onto the below-knee popliteal artery and tibial peroneal trunk.  The bypass was noted to have an increasing stenosis now down to 1.7 mm andoutflow was difficult to determine despite 3+ palpable pulses in both bypass grafts and dorsalis pedis pulses.  Following consultation with vascular surgery, patient has elected to undergo pseudoaneurysm repair and right lower extremity angiogram.   Regarding current exercise tolerance, patient is active working in her home doing usual ADLs and will also pedal   aQUBI  exercise pedal machine for 10 min 3x/day often without chest pain or shortness of breath.        Preop Questions 12/27/2022   1. Have you " ever had a heart attack or stroke? YES -previous myocardial infarction.  Suboptimal stress echo 2021 showed normal left ventricular function and wall motion at rest and post-stress of 3 minutes   2. Have you ever had surgery on your heart or blood vessels, such as a stent placement, a coronary artery bypass, or surgery on an artery in your head, neck, heart, or legs? YES - See surgical hx below   3. Do you have chest pain with activity? No   4. Do you have a history of  heart failure? No   5. Do you currently have a cold, bronchitis or symptoms of other infection? No   6. Do you have a cough, shortness of breath, or wheezing? No   7. Do you or anyone in your family have previous history of blood clots? YES - Brother (LE DVT) and son  (LE DVT & PE).   8. Do you or does anyone in your family have a serious bleeding problem such as prolonged bleeding following surgeries or cuts? No   9. Have you ever had problems with anemia or been told to take iron pills? No   10. Have you had any abnormal blood loss such as black, tarry or bloody stools, or abnormal vaginal bleeding? No   11. Have you ever had a blood transfusion? No   12. Are you willing to have a blood transfusion if it is medically needed before, during, or after your surgery? Yes   13. Have you or any of your relatives ever had problems with anesthesia? No   14. Do you have sleep apnea, excessive snoring or daytime drowsiness? No   15. Do you have any artifical heart valves or other implanted medical devices like a pacemaker, defibrillator, or continuous glucose monitor? No   16. Do you have artificial joints? No   17. Are you allergic to latex? No   18. Is there any chance that you may be pregnant? -       Health Care Directive:  Patient does not have a Health Care Directive or Living Will:     Preoperative Review of :   reviewed - Oxycodone 12 tablets prescribed on 2/16/2022.  None since that time      Status of Chronic Conditions:  CAD - Patient has a  "longstanding history of  CAD. Patient denies recent chest pain or recent NTG use, denies exercise induced dyspnea or PND. Last Stress test suboptimal 2021, EKG today.     COPD - Patient has a longstanding history of   COPD . Patient has been doing well overall noting mild SOB and continues on medication regimen consisting of inhaler therapy without adverse reactions or side effects. Last PFTs from 2014 showed  \"mildly reduced FVC and FEV1 at 2.48 and 1.99 respectively. In summary, mildly abnormal spirometry, nonspecific.  Lung volume preserved and on the high side, suggestive of mild airtrapping and hyperinflation, mildly reduced DLCO.  The flow volume loops show some limitation during inspiration.\"    HYPERLIPIDEMIA - Patient has a long history of significant Hyperlipidemia requiring medication for treatment with recent good control. Patient reports no problems or side effects with the medication.     HYPERTENSION - Patient has longstanding history of HTN , currently denies any symptoms referable to elevated blood pressure. Specifically denies chest pain, palpitations, dyspnea, orthopnea, PND or peripheral edema. Blood pressure readings have been in normal range. Current medication regimen is as listed below. Patient denies any side effects of medication.      CHARCOT AMAN TOOTH - Progressive weakness in lower extremities and some in hands.  Management per neurology. No recent fall per patient    PAD - See HPI. Denies current  claudication pain LEs with ADL activity    TOBACCO USE - Not motivated to quit. 1 ppd      Review of Systems  CONSTITUTIONAL: NEGATIVE for fever, chills.  12 pound weight gain in the past 1 year after previous weight loss  INTEGUMENTARY/SKIN: NEGATIVE for worrisome rashes, moles or lesions  EYES: NEGATIVE for vision changes or irritation  ENT/MOUTH: NEGATIVE for ear, mouth and throat problems  RESP:  POSITIVE for continued smoking.  Mild chronic shortness of breath.  No recent cough.  On " inhaler therapy  CV: NEGATIVE for chest pain, palpitations or peripheral edema.See HPI also  GI: NEGATIVE for nausea, abdominal pain, heartburn, or change in bowel habits  : NEGATIVE for frequency, dysuria, or hematuria  MUSCULOSKELETAL: NEGATIVE for significant arthralgias or myalgia  NEURO: NEGATIVE for   Dizziness. Has some extremity weakness with CMT. Not requiring assistance with ambulation  ENDOCRINE: NEGATIVE for temperature intolerance   HEME: NEGATIVE for bleeding problems  PSYCHIATRIC: NEGATIVE for changes in mood or affect    Patient Active Problem List    Diagnosis Date Noted     Health Care Home 06/06/2011     Priority: High     EMERGENCY CARE PLAN  Presenting Problem Signs and Symptoms Treatment Plan    Questions or conerns during clinic hours    I will call the clinic directly     Questions or conerns outside clinic hours    I will call the 24 hour nurse line at [phone]    Patient needs to schedule an appointment    I will call the 24 hour scheduling team at [phone] or clinic directly    Same day treatment     I will call the clinic first, nurse line if after hours, urgent care and express care if needed                            DX V65.8 REPLACED WITH [id number] [facility] CARE HOME (04/08/2013)       Critical ischemia of extremity with history of revascularization of same extremity (H) 06/24/2021     Priority: Medium     Added automatically from request for surgery [id number]       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     Priority: Medium     Pathogenic PMP22 Duplication   [facility] #[id number]       Bilateral carpal tunnel syndrome      Priority: Medium     History of colonic polyps 02/21/2020     Priority: Medium     SVT (supraventricular tachycardia) (H) 02/21/2020     Priority: Medium     Vitamin C deficiency 08/12/2018     Priority: Medium     Anxiety 12/07/2017     Priority: Medium     Chronic allergic rhinitis due to animal hair and dander 07/20/2017     Priority: Medium     Tobacco use  disorder 07/20/2017     Priority: Medium     Chronic obstructive pulmonary disease, unspecified COPD type (H) 07/20/2017     Priority: Medium     S/P carotid endarterectomy 07/07/2016     Priority: Medium     RIGHT SIDE       Coronary artery disease involving native coronary artery of native heart without angina pectoris 03/03/2016     Priority: Medium     Primary osteoarthritis involving multiple joints 03/03/2016     Priority: Medium     DDD (degenerative disc disease), lumbar 03/03/2016     Priority: Medium     Hyperlipidemia LDL goal <70 07/07/2014     Priority: Medium     PAD (peripheral artery disease) (H) 07/07/2014     Priority: Medium     Essential hypertension 07/07/2014     Priority: Medium     Problem list name updated by automated process. 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 Simons dermatology     Embolism and thrombosis of unspecified artery (H) 08/2000    Protein C,S, Leiden FV, Lupus Inhibitor Negative     Gastroesophageal reflux disease      Hyperlipidaemia      Hypertension      Lupus (H)     skin     Mild major depression (H) 11/07/2017     Myocardial infarction (H)     x3     Osteoarthrosis, unspecified whether generalized or localized, unspecified site      PAD (peripheral artery disease) (H)      Peripheral vascular disease, unspecified (H) 12/2000    s/p angioplasty with stent right femoral a.; Right iliac and femoral a. clot     Post-menopausal      Reflux esophagitis 02/2004    EGD: esophagitis and medium HH     SBO (small bowel obstruction) (H) 08/10/2021     SVT  (supraventricular tachycardia) (H)      Thrombocytopenia (H)      Uncomplicated asthma      Vitamin C deficiency 08/12/2018     Past Surgical History:   Procedure Laterality Date     ANGIOGRAM       ANGIOGRAM Right 6/23/2021    Procedure: RIGHT LOWER EXTREMITY ANGIOGRAM WITH LEFT BRACHIAL ARTERY CUTDOWN;  Surgeon: José Luis Hernandez MD;  Location:  OR     BYPASS GRAFT FEMOROPOPLITEAL Right 09/23/2020    Procedure: RIGHT FEMORAL GRAFT TO ABOVE-KNEE POPLITEAL BYPASS USING CADAVERIC SUPERFICIAL FEMORAL ARTERY;  Surgeon: Chadwick Lozano MD;  Location:  OR     BYPASS GRAFT FEMOROPOPLITEAL Right 2/15/2022    Procedure: RIGHT SUPERFICIAL FEMORAL ARTERY GRAFT TO BELOW KNEE POPLITEAL BYPASS WITH CADAVERIC CRYOLIFE  FEMORAL-POPLITEAL ARTERY SIZE: OUTER DIAMETER: 7MM - 6MM;  Surgeon: Chadwick Lozano;  Location:  OR     BYPASS GRAFT INSITU FEMOROPOPLITEAL Right 7/7/2021    Procedure: CREATION RIGHT FEMORAL TO POPLITEAL ARTERIAL BYPASS USING INSITU VEIN GRAFT;  Surgeon: José Luis Hernandez MD;  Location:  OR     CARDIAC CATHERIZATION  09/03/2009    multivessel CAD without target lesions, med mgmt indicated, preserved ef     CARPAL TUNNEL RELEASE RT/LT Right 05/20/2021     ENDARTERECTOMY CAROTID Right 05/11/2016    Procedure: ENDARTERECTOMY CAROTID;  Surgeon: Chadwick Lozano MD;  Location:  OR     ENDARTERECTOMY CAROTID Left 06/08/2020    Procedure: LEFT CAROTID ENDARTERECTOMY with distal facal vein patch  and EEG;  Surgeon: Chadwick Lozano MD;  Location:  OR     GYN SURGERY  left tube    left salpingectomy     IR LOWER EXTREMITY ANGIOGRAM RIGHT  05/25/2021     IR OR ANGIOGRAM  6/23/2021     LAPAROSCOPIC CHOLECYSTECTOMY N/A 09/27/2017    Procedure: LAPAROSCOPIC CHOLECYSTECTOMY;  LAPAROSCOPIC CHOLECYSTECTOMY;  Surgeon: Jacoby Tapia MD;  Location: [facility]     LAPAROSCOPY DIAGNOSTIC (GENERAL) N/A 8/11/2021    Procedure: Exploratory laparoscopy,  laparoscopic lysis of  adhesions, laparotomy;  Surgeon: Corina Ferreira MD;  Location: [facility] OR     ORTHOPEDIC SURGERY      left knee surgery     VASCULAR SURGERY  aoto bi fem  left fem-AK pop     [facility] FABRIC WRAPPING OF ABDOMINAL ANEURYSM       [facility] NONSPECIFIC PROCEDURE  12/2000    angioplasty with stent right fem. a.     [facility] NONSPECIFIC PROCEDURE  1987    sinus surgery     [facility] NONSPECIFIC PROCEDURE  09/02/2009    Emergent left groin exploration with oversewing of bleeding angiographic site. 2. Endarterectomy of common femoral-proximal superficial femoral artery with greater saphenous vein patch angioplasty.   a. [place] of accessory right greater saphenous vein.      [facility] NONSPECIFIC PROCEDURE  08/27/2009    occluded left common iliac and external iliac arteries were successfully revascularized with stenting to 8 and 7 mm      Current Outpatient Medications   Medication Sig Dispense Refill     acetaminophen (TYLENOL) 500 MG tablet Take 500-1,000 mg by mouth every 6 hours as needed for mild pain       amLODIPine (NORVASC) 5 MG tablet Take 1 tablet (5 mg) by mouth 2 times daily. 180 tablet 0     aspirin (ASA) 81 MG chewable tablet Take 1 tablet (81 mg) by mouth daily 30 tablet 3     BREO ELLIPTA 200-25 MCG/ACT inhaler Inhale 1 puff into the lungs daily 120 each 11     Calcium Carb-Cholecalciferol (CALCIUM CARBONATE-VITAMIN D3) 600-400 MG-UNIT TABS TAKE ONE TABLET BY MOUTH TWICE DAILY 180 tablet 3     carvedilol (COREG) 6.25 MG tablet TAKE ONE TABLET BY MOUTH TWICE A DAY WITH MEALS 180 tablet 3     clopidogrel (PLAVIX) 75 MG tablet Take 1 tablet (75 mg) by mouth daily 90 tablet 3     denosumab (PROLIA) 60 MG/ML SOLN injection Inject 1 mL (60 mg) Subcutaneous every 6 months INDICATION: TO TREAT OSTEOPOROSIS 1 Syringe 0     diphenhydrAMINE (BENADRYL) 25 MG tablet take Benadryl 25-50 mg one hour prior to the procedure 2 tablet 0     gabapentin (NEURONTIN) 100 MG capsule Take 1 capsule (100 mg) by mouth 3 times daily 90 capsule 3     ibuprofen  "(ADVIL/MOTRIN) 400 MG tablet Take 1 tablet (400 mg) by mouth every 6 hours as needed for moderate pain       lisinopril (ZESTRIL) 20 MG tablet Take 1 tablet (20 mg) by mouth 2 times daily. 180 tablet 2     methylPREDNISolone (MEDROL) 16 MG tablet Take two tablets (32mg) Medrol by mouth 12 hours before procedure and repeat two tablets (32mg) by mouth 2 hours before procedure to prevent contrast reaction. If you have had an allergic reaction (hives) after being pre-treated with Medrol, you will also need to take Benadryl 25-50 mg one hour prior to the procedure. 4 tablet 0     nitroGLYcerin (NITROSTAT) 0.4 MG sublingual tablet Place 1 tablet (0.4 mg) under the tongue See Admin Instructions for chest pain 30 tablet 11     omeprazole (PRILOSEC) 20 MG DR capsule TAKE ONE CAPSULE BY MOUTH DAILY 90 capsule 3     rosuvastatin (CRESTOR) 40 MG tablet Take 1 tablet (40 mg) by mouth At Bedtime 90 tablet 1       Allergies   Allergen Reactions     Contrast Dye Anaphylaxis     RASH, FACIAL AND NECK SWELLING, SOB, WHEEZING     Pantoprazole      Protonix caused diffuse edema     Chantix [Varenicline]      Terrible dreams     Penicillins Itching        Social History     Tobacco Use     Smoking status: Every Day     Packs/day: 1.00     Years: 52.00     Pack years: 52.00     Types: Cigarettes     Last attempt to quit: 8/3/2021     Years since quittin.4     Smokeless tobacco: Never     Tobacco comments:     1/2 PPD   Substance Use Topics     Alcohol use: Not Currently     Comment: x1-2 yr     Family History   Problem Relation Age of Onset     Cancer Mother         bladder     Cardiovascular Father         alive,multiple heart attacks     Diabetes Maternal Grandmother      Lung Cancer Maternal Grandmother      Blood Disease Brother         clotting disorder     History   Drug Use No         Objective     /73   Pulse 50   Temp 97.3  F (36.3  C) (Temporal)   Ht 1.575 m (5' 2\")   Wt 51.7 kg (114 lb)   LMP  (LMP Unknown)   " SpO2 98%   BMI 20.85 kg/m      Physical Exam  Eye: PERRL, EOMI  HENT: ear canals and TM's normal and nose and mouth without ulcers or lesions   Neck:   Thyroid normal to palpation. Bilateral carotid bruit.  1.5cm nontender mass/lymph node palpable  lower right neck over the SCM area. No left side mass/adenopathy palpable  Pulm: Lungs clear to auscultation with slight prolonged expiratory phase.  No current wheeze  CV: Regular rates and rhythm  GI: Soft, nontender, Normal active bowel sounds, No hepatosplenomegaly or masses palpable  Ext: Peripheral pulses intact. Trace BLE ankle  Edema. Some atrophy bilateral hand musculature  Neuro: Decreased light touch sensation distal bilateral upper lower extremities.  Dorsiflexion ankle  strength 4/5 bilaterally    Recent Labs   Lab Test 12/09/22  1444 02/16/22  0753 02/15/22  0632 02/11/22  1655   HGB 12.9 10.9*  --  12.6    173  --  190   *  --  132* 132*   POTASSIUM 4.7  --  4.0 4.4   CR 0.70  --   --  0.67   A1C  --   --  5.3  --          Diagnostics:  Component      Latest Ref Rng & Units 12/27/2022   Sodium      136 - 145 mmol/L 136   Potassium      3.4 - 5.3 mmol/L 4.4   Chloride      98 - 107 mmol/L 99   Carbon Dioxide (CO2)      22 - 29 mmol/L 23   Anion Gap      7 - 15 mmol/L 14   Urea Nitrogen      8.0 - 23.0 mg/dL 13.3   Creatinine      0.51 - 0.95 mg/dL 0.78   Calcium      8.8 - 10.2 mg/dL 9.4   Glucose      70 - 99 mg/dL 83   GFR Estimate      >60 mL/min/1.73m2 84        EKG: Sinus bradycardia with rate 50. Nonspecific increased T wave anterior and nonspecific 1/2 mm ST elevation inferior leads and 1/2-1mm ST elevation all precordial leads.    Revised Cardiac Risk Index (RCRI):  The patient has the following serious cardiovascular risks for perioperative complications:   - Coronary Artery Disease (MI, positive stress test, angina, Qs on EKG) = 1 point     RCRI Interpretation: 1 point: Class II (low risk - 0.9% complication rate)           Signed  Electronically by: Vasquez Benoit MD  Copy of this evaluation report is provided to requesting physician.

## 2022-12-27 NOTE — ANESTHESIA PREPROCEDURE EVALUATION
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 Simons dermatology     Embolism and thrombosis of unspecified artery (H) 2000    Protein C,S, Leiden FV, Lupus Inhibitor Negative     Gastroesophageal reflux disease      Hyperlipidaemia      Hypertension      Lupus (H)     skin     Mild major depression (H) 2017     Myocardial infarction (H)     x3     Osteoarthrosis, unspecified whether generalized or localized, unspecified site      PAD (peripheral artery disease) (H)      Peripheral vascular disease, unspecified (H) 2000    s/p angioplasty with stent right femoral a.; Right iliac and femoral a. clot     Post-menopausal      Reflux esophagitis 2004    EGD: esophagitis and medium HH     SBO (small bowel obstruction) (H) 08/10/2021     SVT (supraventricular tachycardia) (H)      Thrombocytopenia (H)      Uncomplicated asthma      Vitamin C deficiency 2018      Past Surgical History:   Procedure Laterality Date     ANGIOGRAM       ANGIOGRAM Right 2021    Procedure: RIGHT LOWER EXTREMITY ANGIOGRAM WITH LEFT BRACHIAL ARTERY CUTDOWN;  Surgeon: José Luis Hernandez MD;  Location:  OR     BYPASS GRAFT FEMOROPOPLITEAL Right 2020    Procedure: RIGHT FEMORAL GRAFT TO ABOVE-KNEE POPLITEAL BYPASS USING CADAVERIC SUPERFICIAL FEMORAL ARTERY;  Surgeon: Chadwick Lozano MD;  Location:  OR     BYPASS GRAFT FEMOROPOPLITEAL Right 2/15/2022    Procedure: RIGHT SUPERFICIAL FEMORAL ARTERY GRAFT TO BELOW KNEE POPLITEAL BYPASS WITH CADAVERIC CRYOLIFE  FEMORAL-POPLITEAL ARTERY  SIZE: OUTER DIAMETER: 7MM - 6MM;  Surgeon: Chadwick Lozano;  Location:  OR     BYPASS GRAFT INSITU FEMOROPOPLITEAL Right 7/7/2021    Procedure: CREATION RIGHT FEMORAL TO POPLITEAL ARTERIAL BYPASS USING INSITU VEIN GRAFT;  Surgeon: José Luis Hernandez MD;  Location:  OR     CARDIAC CATHERIZATION  09/03/2009    multivessel CAD without target lesions, med mgmt indicated, preserved ef     CARPAL TUNNEL RELEASE RT/LT Right 05/20/2021     ENDARTERECTOMY CAROTID Right 05/11/2016    Procedure: ENDARTERECTOMY CAROTID;  Surgeon: Chadwick Lozano MD;  Location:  OR     ENDARTERECTOMY CAROTID Left 06/08/2020    Procedure: LEFT CAROTID ENDARTERECTOMY with distal facal vein patch  and EEG;  Surgeon: Chadwick Lozano MD;  Location:  OR     GYN SURGERY  left tube    left salpingectomy     IR LOWER EXTREMITY ANGIOGRAM RIGHT  05/25/2021     IR OR ANGIOGRAM  6/23/2021     LAPAROSCOPIC CHOLECYSTECTOMY N/A 09/27/2017    Procedure: LAPAROSCOPIC CHOLECYSTECTOMY;  LAPAROSCOPIC CHOLECYSTECTOMY;  Surgeon: Jacoby Tapia MD;  Location: [facility]     LAPAROSCOPY DIAGNOSTIC (GENERAL) N/A 8/11/2021    Procedure: Exploratory laparoscopy,  laparoscopic lysis of adhesions, laparotomy;  Surgeon: Corina Ferreira MD;  Location:  OR     ORTHOPEDIC SURGERY      left knee surgery     VASCULAR SURGERY  aoto bi fem  left fem-AK pop     [facility] FABRIC WRAPPING OF ABDOMINAL ANEURYSM       [facility] NONSPECIFIC PROCEDURE  12/2000    angioplasty with stent right fem. a.     [facility] NONSPECIFIC PROCEDURE  1987    sinus surgery     [facility] NONSPECIFIC PROCEDURE  09/02/2009    Emergent left groin exploration with oversewing of bleeding angiographic site. 2. Endarterectomy of common femoral-proximal superficial femoral artery with greater saphenous vein patch angioplasty.   a. Stapleton of accessory right greater saphenous vein.      [facility] NONSPECIFIC PROCEDURE  08/27/2009    occluded left common iliac and external iliac arteries were successfully  revascularized with stenting to 8 and 7 mm       Allergies   Allergen Reactions     Contrast Dye Anaphylaxis     RASH, FACIAL AND NECK SWELLING, SOB, WHEEZING     Pantoprazole      Protonix caused diffuse edema     Chantix [Varenicline]      Terrible dreams     Penicillins Itching      Social History     Tobacco Use     Smoking status: Every Day     Packs/day: 1.00     Years: 52.00     Pack years: 52.00     Types: Cigarettes     Last attempt to quit: 8/3/2021     Years since quittin.4     Smokeless tobacco: Never     Tobacco comments:      PPD   Substance Use Topics     Alcohol use: Not Currently     Comment: x1-2 yr      Wt Readings from Last 1 Encounters:   22 51.7 kg (114 lb)        Anesthesia Evaluation   Pt has had prior anesthetic. Type: General.    No history of anesthetic complications       ROS/MED HX  ENT/Pulmonary:     (+) allergic rhinitis, tobacco use, Current use, asthma COPD,  (-) sleep apnea   Neurologic:       Cardiovascular: Comment: Multiple femoral bypass grafts  Hx of bilateral carotid endarterectomies  Hx of SVT    (+) Dyslipidemia hypertension--CAD -past MI --Previous cardiac testing   Echo: Date: Results:    Stress Test: Date:  Results:  Name: KASIA WAN  MRN: [id number]  : 1960  Study Date: 2021 12:57 PM  Age: 60 yrs  Gender: Female  Patient Location: [facility]  Reason For Study: Light headedness  Ordering Physician: JACKIE DOWNEY  Referring Physician: Vasquez Benoit  Performed By: Corey James RDCS     BSA: 1.5 m2  Height: 62 in  Weight: 112 lb  HR: 57  BP: 140/78 mmHg  ______________________________________________________________________________  Procedure  Stress Echo Complete.     ______________________________________________________________________________  Interpretation Summary     Suboptimal stress test due to inadequate maximum heart rate.  Normal left ventricular function and wall motion at rest and post-stress but  LV size and function  were unchanged following limited exercise of 3 minutes.  Post-exercise images were obtained with the heart rate in the 70's bpm.  Blood pressure seth appropriately from 140/78 mmHg (supine, baseline) to  158/80 mmHg during Stage I and fell to 134/74 mmHg in recovery. Consider  Lexiscan stress imaging (nuclear, MRI) for adequate testing.  ______________________________________________________________________________  Stress  Exercise was stopped due to fatigue.  There was a normal BP response to exercise.  Target Heart Rate was not achieved due to fatigue.  Suboptimal stress test due to inadequate maximum heart rate.  J point/ST elevation at rest pseudonormalized with exercise.  There was no arrhythmia.  The Duke treadmill score was intermediate risk ( -11< Sterling score <5).  Normal left ventricular function and wall motion at rest and post-stress.  The visual ejection fraction is estimated at 60-65%.     Baseline  The patient is in normal sinus rhythm.  Baseline ECG displays Q waves in the mike-septal leads.  Elevated J point V3-V6, inferior leads - most C/W early repolarization.  Normal left ventricular function and wall motion at rest and post-stress.  The visual ejection fraction is estimated at 60-65%.     Stress Results     Protocol:  Eliezer Protocol        Maximum Predicted HR:   158 bpm         Target HR: 134 bpm               % Maximum Predicted HR: 66 %        Stage  DurationHeart Rate  BP                    Comment             (mm:ss)   (bpm)    Stage 1   3:00      104   158/80Patient requested to stop   RecoveryR  4:00      56    134/74RPP = 43181, Sterling = 2, FAC = Below average             Stress Duration:   3:00 mm:ss        Recovery Time: 4:00 mm:ss          Maximum Stress HR: 104 bpm           METS:          4     Mitral Valve  There is no mitral regurgitation noted.     Tricuspid Valve  No tricuspid regurgitation.     Aortic Valve  No aortic regurgitation is present. No hemodynamically significant  valvular  aortic stenosis.  ______________________________________________________________________________  MMode/2D Measurements & Calculations  IVSd: 1.00 cm     LVIDd: 4.0 cm  LVIDs: 2.8 cm  LVPWd: 0.73 cm  ECG Reviewed: Date: Results:    Cath:  Date: Results:      METS/Exercise Tolerance: 3 - Able to walk 1-2 blocks without stopping    Hematologic: Comments: Lupus      Musculoskeletal: Comment: Charcot Breonna Tooth      GI/Hepatic:     (+) GERD,     Renal/Genitourinary:       Endo:       Psychiatric/Substance Use:     (+) psychiatric history anxiety     Infectious Disease:       Malignancy:       Other:            Physical Exam    Airway        Mallampati: III   TM distance: > 3 FB   Neck ROM: full   Mouth opening: > 3 cm    Respiratory Devices and Support         Dental       (+) loose    B=Bridge, C=Chipped, L=Loose, M=Missing    Cardiovascular          Rhythm and rate: regular and normal     Pulmonary           (+) decreased breath sounds           OUTSIDE LABS:  CBC:   Lab Results   Component Value Date    WBC 5.0 12/09/2022    WBC 9.3 02/16/2022    HGB 12.9 12/09/2022    HGB 10.9 (L) 02/16/2022    HCT 39.2 12/09/2022    HCT 35.1 02/16/2022     12/09/2022     02/16/2022     BMP:   Lab Results   Component Value Date     (L) 12/09/2022     (L) 02/15/2022    POTASSIUM 4.7 12/09/2022    POTASSIUM 4.0 02/15/2022    CHLORIDE 98 12/09/2022    CHLORIDE 101 02/11/2022    CO2 21 (L) 12/09/2022    CO2 26 02/11/2022    BUN 13.2 12/09/2022    BUN 14 02/11/2022    CR 0.70 12/09/2022    CR 0.67 02/11/2022    GLC 88 12/09/2022    GLC 81 02/17/2022     COAGS:   Lab Results   Component Value Date    PTT 25 04/21/2016    INR 1.01 09/24/2020     POC:   Lab Results   Component Value Date    BGM 87 07/08/2021     HEPATIC:   Lab Results   Component Value Date    ALBUMIN 3.9 12/09/2022    PROTTOTAL 6.9 12/09/2022    ALT 22 12/09/2022    AST 24 12/09/2022    ALKPHOS 80 12/09/2022    BILITOTAL 0.2 12/09/2022      OTHER:   Lab Results   Component Value Date    PH 7.42 03/19/2010    LACT 1.1 08/10/2021    A1C 5.3 02/15/2022    SONIA 8.9 12/09/2022    PHOS 4.0 07/09/2021    MAG 2.1 08/13/2021    LIPASE 132 08/10/2021    AMYLASE 46 08/14/2017    TSH 0.77 01/05/2021    T4 1.19 03/28/2017    CRP <2.9 09/18/2014    SED 19 12/09/2022       Anesthesia Plan    ASA Status:  3   NPO Status:  NPO Appropriate    Anesthesia Type: General.     - Airway: ETT   Induction: Intravenous.   Maintenance: Balanced.   Techniques and Equipment:     - Lines/Monitors: Arterial Line     Consents    Anesthesia Plan(s) and associated risks, benefits, and realistic alternatives discussed. Questions answered and patient/representative(s) expressed understanding.    - Discussed:     - Discussed with:  Patient      - Extended Intubation/Ventilatory Support Discussed: No.      - Patient is DNR/DNI Status: No    Use of blood products discussed: No .     Postoperative Care    Pain management: Multi-modal analgesia.   PONV prophylaxis: Ondansetron (or other 5HT-3), Dexamethasone or Solumedrol     Comments:                Randal Garcia MD

## 2022-12-27 NOTE — PATIENT INSTRUCTIONS
"  Approval given to proceed with proposed procedure, without further diagnostic evaluation..  No solid food after midnight prior to your  procedure. May have clear liquids up to 2 hours prior to the  procedure (11 am)   Remain offof Clopidogrel/Plavix and Aspirin prior to your  procedure to reduce risk of bleeding.  Start both meds  after surgery unless instructed differently  by Dr Lozano   May use Tylenol for pain control  between then and your procedure  Take  Amlodipine, Carvedilol, Gabapentin, Lisinopril, Omeprazole  the morning of surgery. Also use Breo inhaler as usual  Methylprednisolone.  Take two tablets (32mg) Medrol by mouth 12 hours before procedure and repeat two tablets (32mg) by mouth 2 hours before procedure to prevent contrast reaction.Surgery time is 1:00pm  Hold other prescription and over the counter medications/supplements the day of the  procedure. May resume usual medications/supplements after the procedure unless instructed otherwise by your surgeon   Ultrasound of the neck in 3 months to follow-up on lymph node size and to also assess possible thyroid nodules. Let me know earlier if noticing there \"lumps\" in neck. Central scheduling will call you or you may call [phone]  I encourage you to stop all smoking.  If  willing to quit in the future,  contact the clinic if interested in prescription therapy  or contact  [facility] toll-free number (0-613-QUIT-NOW) or through the internet  ([email or web address]) for free nicotine supplementation treatment and/or counseling      "

## 2022-12-27 NOTE — TELEPHONE ENCOUNTER
[place] [facility]    I called Shirley Martroquin about her surgery tomorrow.  No questions.    2/15/2022  A1C=5.3   LDL=61    12/09/2022   K=4.0   Iq=473   SCr=0.70    Chadwick Lozano MD

## 2022-12-27 NOTE — H&P (VIEW-ONLY)
[address] [address]  Phone: [phone]  Primary Provider: Vasquez Panda  Pre-op Performing Provider: VASQUEZ PANDA      PREOPERATIVE EVALUATION:  Today's date: 12/27/2022    Shirley Hendricks is a 62 year old female who presents for a preoperative evaluation.    Surgical Information:  Surgery/Procedure: REPAIR LEFT FEMORAL PSEUDOANEURYSM, RIGHT LEG ANGIOGRAM  Surgery Location: [facility]  Surgeon: Dr. Lozano  Surgery Date: 12/28/2022  Time of Surgery: 1:00 pm  Where patient plans to recover: At home with family  Fax number for surgical facility: Note does not need to be faxed, will be available electronically in Epic.    Type of Anesthesia Anticipated: General          Assessment & Plan     The proposed surgical procedure is considered INTERMEDIATE risk.    1. Preop general physical exam  Appears medically stable to proceed with surgery as scheduled  - EKG 12-lead complete w/read - Clinics    2. Pseudoaneurysm (H)    3. PAD (peripheral artery disease) (H)   See HPI.  Recent carotid duplex showed widely patent left CEA and right CEA pending stenosis of the distal common carotid artery felt to be 67% of the diameter compared to previous 60%.  Widely patent right ICA per vascular notes    4. Essential hypertension  Controlled  - amLODIPine (NORVASC) 5 MG tablet; Take 1 tablet (5 mg) by mouth 2 times daily  Dispense: 180 tablet; Refill: 3    5. Hyponatremia  Sodium normal at 136 today  - Basic metabolic panel; Future  - Basic metabolic panel    6. Coronary artery disease involving native coronary artery of native heart without angina pectoris  Denies chest pain with usual activity. EKG with nonspecific changes that does not appear ischemic in etiology, especially in absence of any chest pain with exertional activity with stationary pedaling 10-20 min 3x/day oftem    7. Charcot-Breonna-Tooth disease type 1A  Stable.  Denies recent  fall    8. Chronic obstructive pulmonary disease, unspecified COPD type (H)  Stable with current inhaler therapy    9. Tobacco use disorder  Smoking 1 pack of cigarettes per day.  Not interested in quitting despite strong MD recommendation    10. Hyperlipidemia LDL goal <70  On high-dose statin therapy.  Lipids at goal    11. Enlarged lymph node   Single mildly prominent right level IB lymph node measuring 1.5 cm long axis. This is nonspecific. No other cervical lymphadenopathy.  Will recheck lymph node size with ultrasound in 3 months.  If increasing in size, will get lymph node biopsy  - US Thyroid; Future     12. Thyroid nodule  Heterogeneous appearance of the thyroid gland likely representing underlying nodules seen on CT neck Dec 2022. Previous TSH normal. Will get thyroid U/S in 3 mos  - US Thyroid; Future    13.  Allergic to IV contrast  Pretreating with methylprednisolone and diphenhydramine       Risks and Recommendations:  The patient has the following additional risks and recommendations for perioperative complications:   - No identified additional risk factors other than previously addressed    Patient instructions    Approval given to proceed with proposed procedure, without further diagnostic evaluation..  No solid food after midnight prior to your  procedure. May have clear liquids up to 2 hours prior to the  procedure (11 am)   Remain offof Clopidogrel/Plavix and Aspirin prior to your  procedure to reduce risk of bleeding.  Start both meds  after surgery unless instructed differently  by Dr Lozano   May use Tylenol for pain control  between then and your procedure  Take  Amlodipine, Carvedilol, Gabapentin, Lisinopril, Omeprazole  the morning of surgery. Also use Breo inhaler as usual  Methylprednisolone.  Take two tablets (32mg) Medrol by mouth 12 hours before procedure and repeat two tablets (32mg) by mouth 2 hours before procedure to prevent contrast reaction. Surgery time is 1:00pm  Hold other  "prescription and over the counter medications/supplements the day of the  procedure. May resume usual medications/supplements after the procedure unless instructed otherwise by your surgeon   Ultrasound of the neck in 3 months to follow-up on lymph node size and to also assess possible thyroid nodules. Let me know earlier if noticing there \"lumps\" in neck  I encourage you to stop all smoking.  If  willing to quit in the future,  contact the clinic if interested in prescription therapy  or contact  [facility] toll-free number (7-679-QUIT-NOW) or through the internet  ([facility]com) for free nicotine supplementation treatment and/or counseling    RECOMMENDATION:  APPROVAL GIVEN to proceed with proposed procedure, without further diagnostic evaluation.            Subjective     HPI related to upcoming procedure:    History of a small lump in the left groin that patient feels has increased in size.  No signs of infection in the area.  Imaging showed a left femoral pseudoaneurysm just beyond the aortobifemoral graft and before the in situ graft measuring 2.4 x 2.9 x 2.3 cm.  The graft itself was widely patent.  History of right lower extremity SFA/cephalic vein bypass to below-knee popliteal artery with failure requiring redo bypass with cadaveric SFA with a distal anastomosis onto the below-knee popliteal artery and tibial peroneal trunk.  The bypass was noted to have an increasing stenosis now down to 1.7 mm andoutflow was difficult to determine despite 3+ palpable pulses in both bypass grafts and dorsalis pedis pulses.  Following consultation with vascular surgery, patient has elected to undergo pseudoaneurysm repair and right lower extremity angiogram.   Regarding current exercise tolerance, patient is active working in her home doing usual ADLs and will also pedal   aQUBI  exercise pedal machine for 10 min 3x/day often without chest pain or shortness of breath.        Preop Questions 12/27/2022   1. Have you " ever had a heart attack or stroke? YES -previous myocardial infarction.  Suboptimal stress echo 2021 showed normal left ventricular function and wall motion at rest and post-stress of 3 minutes   2. Have you ever had surgery on your heart or blood vessels, such as a stent placement, a coronary artery bypass, or surgery on an artery in your head, neck, heart, or legs? YES - See surgical hx below   3. Do you have chest pain with activity? No   4. Do you have a history of  heart failure? No   5. Do you currently have a cold, bronchitis or symptoms of other infection? No   6. Do you have a cough, shortness of breath, or wheezing? No   7. Do you or anyone in your family have previous history of blood clots? YES - Brother (LE DVT) and son  (LE DVT & PE).   8. Do you or does anyone in your family have a serious bleeding problem such as prolonged bleeding following surgeries or cuts? No   9. Have you ever had problems with anemia or been told to take iron pills? No   10. Have you had any abnormal blood loss such as black, tarry or bloody stools, or abnormal vaginal bleeding? No   11. Have you ever had a blood transfusion? No   12. Are you willing to have a blood transfusion if it is medically needed before, during, or after your surgery? Yes   13. Have you or any of your relatives ever had problems with anesthesia? No   14. Do you have sleep apnea, excessive snoring or daytime drowsiness? No   15. Do you have any artifical heart valves or other implanted medical devices like a pacemaker, defibrillator, or continuous glucose monitor? No   16. Do you have artificial joints? No   17. Are you allergic to latex? No   18. Is there any chance that you may be pregnant? -       Health Care Directive:  Patient does not have a Health Care Directive or Living Will:     Preoperative Review of :   reviewed - Oxycodone 12 tablets prescribed on 2/16/2022.  None since that time      Status of Chronic Conditions:  CAD - Patient has a  "longstanding history of  CAD. Patient denies recent chest pain or recent NTG use, denies exercise induced dyspnea or PND. Last Stress test suboptimal 2021, EKG today.     COPD - Patient has a longstanding history of   COPD . Patient has been doing well overall noting mild SOB and continues on medication regimen consisting of inhaler therapy without adverse reactions or side effects. Last PFTs from 2014 showed  \"mildly reduced FVC and FEV1 at 2.48 and 1.99 respectively. In summary, mildly abnormal spirometry, nonspecific.  Lung volume preserved and on the high side, suggestive of mild airtrapping and hyperinflation, mildly reduced DLCO.  The flow volume loops show some limitation during inspiration.\"    HYPERLIPIDEMIA - Patient has a long history of significant Hyperlipidemia requiring medication for treatment with recent good control. Patient reports no problems or side effects with the medication.     HYPERTENSION - Patient has longstanding history of HTN , currently denies any symptoms referable to elevated blood pressure. Specifically denies chest pain, palpitations, dyspnea, orthopnea, PND or peripheral edema. Blood pressure readings have been in normal range. Current medication regimen is as listed below. Patient denies any side effects of medication.      CHARCOT AMAN TOOTH - Progressive weakness in lower extremities and some in hands.  Management per neurology. No recent fall per patient    PAD - See HPI. Denies current  claudication pain LEs with ADL activity    TOBACCO USE - Not motivated to quit. 1 ppd      Review of Systems  CONSTITUTIONAL: NEGATIVE for fever, chills.  12 pound weight gain in the past 1 year after previous weight loss  INTEGUMENTARY/SKIN: NEGATIVE for worrisome rashes, moles or lesions  EYES: NEGATIVE for vision changes or irritation  ENT/MOUTH: NEGATIVE for ear, mouth and throat problems  RESP:  POSITIVE for continued smoking.  Mild chronic shortness of breath.  No recent cough.  On " inhaler therapy  CV: NEGATIVE for chest pain, palpitations or peripheral edema.See HPI also  GI: NEGATIVE for nausea, abdominal pain, heartburn, or change in bowel habits  : NEGATIVE for frequency, dysuria, or hematuria  MUSCULOSKELETAL: NEGATIVE for significant arthralgias or myalgia  NEURO: NEGATIVE for   Dizziness. Has some extremity weakness with CMT. Not requiring assistance with ambulation  ENDOCRINE: NEGATIVE for temperature intolerance   HEME: NEGATIVE for bleeding problems  PSYCHIATRIC: NEGATIVE for changes in mood or affect    Patient Active Problem List    Diagnosis Date Noted     Health Care Home 06/06/2011     Priority: High     EMERGENCY CARE PLAN  Presenting Problem Signs and Symptoms Treatment Plan    Questions or conerns during clinic hours    I will call the clinic directly     Questions or conerns outside clinic hours    I will call the 24 hour nurse line at [phone]    Patient needs to schedule an appointment    I will call the 24 hour scheduling team at [phone] or clinic directly    Same day treatment     I will call the clinic first, nurse line if after hours, urgent care and express care if needed                            DX V65.8 REPLACED WITH [id number] [facility] CARE HOME (04/08/2013)       Critical ischemia of extremity with history of revascularization of same extremity (H) 06/24/2021     Priority: Medium     Added automatically from request for surgery [id number]       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     Priority: Medium     Pathogenic PMP22 Duplication   [facility] #[id number]       Bilateral carpal tunnel syndrome      Priority: Medium     History of colonic polyps 02/21/2020     Priority: Medium     SVT (supraventricular tachycardia) (H) 02/21/2020     Priority: Medium     Vitamin C deficiency 08/12/2018     Priority: Medium     Anxiety 12/07/2017     Priority: Medium     Chronic allergic rhinitis due to animal hair and dander 07/20/2017     Priority: Medium     Tobacco use  disorder 07/20/2017     Priority: Medium     Chronic obstructive pulmonary disease, unspecified COPD type (H) 07/20/2017     Priority: Medium     S/P carotid endarterectomy 07/07/2016     Priority: Medium     RIGHT SIDE       Coronary artery disease involving native coronary artery of native heart without angina pectoris 03/03/2016     Priority: Medium     Primary osteoarthritis involving multiple joints 03/03/2016     Priority: Medium     DDD (degenerative disc disease), lumbar 03/03/2016     Priority: Medium     Hyperlipidemia LDL goal <70 07/07/2014     Priority: Medium     PAD (peripheral artery disease) (H) 07/07/2014     Priority: Medium     Essential hypertension 07/07/2014     Priority: Medium     Problem list name updated by automated process. 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 Simons dermatology     Embolism and thrombosis of unspecified artery (H) 08/2000    Protein C,S, Leiden FV, Lupus Inhibitor Negative     Gastroesophageal reflux disease      Hyperlipidaemia      Hypertension      Lupus (H)     skin     Mild major depression (H) 11/07/2017     Myocardial infarction (H)     x3     Osteoarthrosis, unspecified whether generalized or localized, unspecified site      PAD (peripheral artery disease) (H)      Peripheral vascular disease, unspecified (H) 12/2000    s/p angioplasty with stent right femoral a.; Right iliac and femoral a. clot     Post-menopausal      Reflux esophagitis 02/2004    EGD: esophagitis and medium HH     SBO (small bowel obstruction) (H) 08/10/2021     SVT  (supraventricular tachycardia) (H)      Thrombocytopenia (H)      Uncomplicated asthma      Vitamin C deficiency 08/12/2018     Past Surgical History:   Procedure Laterality Date     ANGIOGRAM       ANGIOGRAM Right 6/23/2021    Procedure: RIGHT LOWER EXTREMITY ANGIOGRAM WITH LEFT BRACHIAL ARTERY CUTDOWN;  Surgeon: José Luis Hernandez MD;  Location:  OR     BYPASS GRAFT FEMOROPOPLITEAL Right 09/23/2020    Procedure: RIGHT FEMORAL GRAFT TO ABOVE-KNEE POPLITEAL BYPASS USING CADAVERIC SUPERFICIAL FEMORAL ARTERY;  Surgeon: Chadwick Lozano MD;  Location:  OR     BYPASS GRAFT FEMOROPOPLITEAL Right 2/15/2022    Procedure: RIGHT SUPERFICIAL FEMORAL ARTERY GRAFT TO BELOW KNEE POPLITEAL BYPASS WITH CADAVERIC CRYOLIFE  FEMORAL-POPLITEAL ARTERY SIZE: OUTER DIAMETER: 7MM - 6MM;  Surgeon: Chadwick Lozano;  Location:  OR     BYPASS GRAFT INSITU FEMOROPOPLITEAL Right 7/7/2021    Procedure: CREATION RIGHT FEMORAL TO POPLITEAL ARTERIAL BYPASS USING INSITU VEIN GRAFT;  Surgeon: José Luis Hernandez MD;  Location:  OR     CARDIAC CATHERIZATION  09/03/2009    multivessel CAD without target lesions, med mgmt indicated, preserved ef     CARPAL TUNNEL RELEASE RT/LT Right 05/20/2021     ENDARTERECTOMY CAROTID Right 05/11/2016    Procedure: ENDARTERECTOMY CAROTID;  Surgeon: Chadwick Lozano MD;  Location:  OR     ENDARTERECTOMY CAROTID Left 06/08/2020    Procedure: LEFT CAROTID ENDARTERECTOMY with distal facal vein patch  and EEG;  Surgeon: Chadwick Lozano MD;  Location:  OR     GYN SURGERY  left tube    left salpingectomy     IR LOWER EXTREMITY ANGIOGRAM RIGHT  05/25/2021     IR OR ANGIOGRAM  6/23/2021     LAPAROSCOPIC CHOLECYSTECTOMY N/A 09/27/2017    Procedure: LAPAROSCOPIC CHOLECYSTECTOMY;  LAPAROSCOPIC CHOLECYSTECTOMY;  Surgeon: Jacoby Tapia MD;  Location: [facility]     LAPAROSCOPY DIAGNOSTIC (GENERAL) N/A 8/11/2021    Procedure: Exploratory laparoscopy,  laparoscopic lysis of  adhesions, laparotomy;  Surgeon: Corina Ferreira MD;  Location: [facility] OR     ORTHOPEDIC SURGERY      left knee surgery     VASCULAR SURGERY  aoto bi fem  left fem-AK pop     [facility] FABRIC WRAPPING OF ABDOMINAL ANEURYSM       [facility] NONSPECIFIC PROCEDURE  12/2000    angioplasty with stent right fem. a.     [facility] NONSPECIFIC PROCEDURE  1987    sinus surgery     [facility] NONSPECIFIC PROCEDURE  09/02/2009    Emergent left groin exploration with oversewing of bleeding angiographic site. 2. Endarterectomy of common femoral-proximal superficial femoral artery with greater saphenous vein patch angioplasty.   a. [place] of accessory right greater saphenous vein.      [facility] NONSPECIFIC PROCEDURE  08/27/2009    occluded left common iliac and external iliac arteries were successfully revascularized with stenting to 8 and 7 mm      Current Outpatient Medications   Medication Sig Dispense Refill     acetaminophen (TYLENOL) 500 MG tablet Take 500-1,000 mg by mouth every 6 hours as needed for mild pain       amLODIPine (NORVASC) 5 MG tablet Take 1 tablet (5 mg) by mouth 2 times daily. 180 tablet 0     aspirin (ASA) 81 MG chewable tablet Take 1 tablet (81 mg) by mouth daily 30 tablet 3     BREO ELLIPTA 200-25 MCG/ACT inhaler Inhale 1 puff into the lungs daily 120 each 11     Calcium Carb-Cholecalciferol (CALCIUM CARBONATE-VITAMIN D3) 600-400 MG-UNIT TABS TAKE ONE TABLET BY MOUTH TWICE DAILY 180 tablet 3     carvedilol (COREG) 6.25 MG tablet TAKE ONE TABLET BY MOUTH TWICE A DAY WITH MEALS 180 tablet 3     clopidogrel (PLAVIX) 75 MG tablet Take 1 tablet (75 mg) by mouth daily 90 tablet 3     denosumab (PROLIA) 60 MG/ML SOLN injection Inject 1 mL (60 mg) Subcutaneous every 6 months INDICATION: TO TREAT OSTEOPOROSIS 1 Syringe 0     diphenhydrAMINE (BENADRYL) 25 MG tablet take Benadryl 25-50 mg one hour prior to the procedure 2 tablet 0     gabapentin (NEURONTIN) 100 MG capsule Take 1 capsule (100 mg) by mouth 3 times daily 90 capsule 3     ibuprofen  "(ADVIL/MOTRIN) 400 MG tablet Take 1 tablet (400 mg) by mouth every 6 hours as needed for moderate pain       lisinopril (ZESTRIL) 20 MG tablet Take 1 tablet (20 mg) by mouth 2 times daily. 180 tablet 2     methylPREDNISolone (MEDROL) 16 MG tablet Take two tablets (32mg) Medrol by mouth 12 hours before procedure and repeat two tablets (32mg) by mouth 2 hours before procedure to prevent contrast reaction. If you have had an allergic reaction (hives) after being pre-treated with Medrol, you will also need to take Benadryl 25-50 mg one hour prior to the procedure. 4 tablet 0     nitroGLYcerin (NITROSTAT) 0.4 MG sublingual tablet Place 1 tablet (0.4 mg) under the tongue See Admin Instructions for chest pain 30 tablet 11     omeprazole (PRILOSEC) 20 MG DR capsule TAKE ONE CAPSULE BY MOUTH DAILY 90 capsule 3     rosuvastatin (CRESTOR) 40 MG tablet Take 1 tablet (40 mg) by mouth At Bedtime 90 tablet 1       Allergies   Allergen Reactions     Contrast Dye Anaphylaxis     RASH, FACIAL AND NECK SWELLING, SOB, WHEEZING     Pantoprazole      Protonix caused diffuse edema     Chantix [Varenicline]      Terrible dreams     Penicillins Itching        Social History     Tobacco Use     Smoking status: Every Day     Packs/day: 1.00     Years: 52.00     Pack years: 52.00     Types: Cigarettes     Last attempt to quit: 8/3/2021     Years since quittin.4     Smokeless tobacco: Never     Tobacco comments:     1/2 PPD   Substance Use Topics     Alcohol use: Not Currently     Comment: x1-2 yr     Family History   Problem Relation Age of Onset     Cancer Mother         bladder     Cardiovascular Father         alive,multiple heart attacks     Diabetes Maternal Grandmother      Lung Cancer Maternal Grandmother      Blood Disease Brother         clotting disorder     History   Drug Use No         Objective     /73   Pulse 50   Temp 97.3  F (36.3  C) (Temporal)   Ht 1.575 m (5' 2\")   Wt 51.7 kg (114 lb)   LMP  (LMP Unknown)   " SpO2 98%   BMI 20.85 kg/m      Physical Exam  Eye: PERRL, EOMI  HENT: ear canals and TM's normal and nose and mouth without ulcers or lesions   Neck:   Thyroid normal to palpation. Bilateral carotid bruit.  1.5cm nontender mass/lymph node palpable  lower right neck over the SCM area. No left side mass/adenopathy palpable  Pulm: Lungs clear to auscultation with slight prolonged expiratory phase.  No current wheeze  CV: Regular rates and rhythm  GI: Soft, nontender, Normal active bowel sounds, No hepatosplenomegaly or masses palpable  Ext: Peripheral pulses intact. Trace BLE ankle  Edema. Some atrophy bilateral hand musculature  Neuro: Decreased light touch sensation distal bilateral upper lower extremities.  Dorsiflexion ankle  strength 4/5 bilaterally    Recent Labs   Lab Test 12/09/22  1444 02/16/22  0753 02/15/22  0632 02/11/22  1655   HGB 12.9 10.9*  --  12.6    173  --  190   *  --  132* 132*   POTASSIUM 4.7  --  4.0 4.4   CR 0.70  --   --  0.67   A1C  --   --  5.3  --          Diagnostics:  Component      Latest Ref Rng & Units 12/27/2022   Sodium      136 - 145 mmol/L 136   Potassium      3.4 - 5.3 mmol/L 4.4   Chloride      98 - 107 mmol/L 99   Carbon Dioxide (CO2)      22 - 29 mmol/L 23   Anion Gap      7 - 15 mmol/L 14   Urea Nitrogen      8.0 - 23.0 mg/dL 13.3   Creatinine      0.51 - 0.95 mg/dL 0.78   Calcium      8.8 - 10.2 mg/dL 9.4   Glucose      70 - 99 mg/dL 83   GFR Estimate      >60 mL/min/1.73m2 84        EKG: Sinus bradycardia with rate 50. Nonspecific increased T wave anterior and nonspecific 1/2 mm ST elevation inferior leads and 1/2-1mm ST elevation all precordial leads.    Revised Cardiac Risk Index (RCRI):  The patient has the following serious cardiovascular risks for perioperative complications:   - Coronary Artery Disease (MI, positive stress test, angina, Qs on EKG) = 1 point     RCRI Interpretation: 1 point: Class II (low risk - 0.9% complication rate)           Signed  Electronically by: Vasquez Benoit MD  Copy of this evaluation report is provided to requesting physician.

## 2022-12-28 ENCOUNTER — HOSPITAL ENCOUNTER (INPATIENT)
Facility: CLINIC | Age: 62
LOS: 2 days | Discharge: HOME | DRG: 271 | End: 2022-12-30
Attending: SURGERY | Admitting: SURGERY
Payer: COMMERCIAL

## 2022-12-28 ENCOUNTER — APPOINTMENT (OUTPATIENT)
Dept: SURGERY | Facility: PHYSICIAN GROUP | Age: 62
End: 2022-12-28
Payer: COMMERCIAL

## 2022-12-28 ENCOUNTER — APPOINTMENT (OUTPATIENT)
Dept: INTERVENTIONAL RADIOLOGY/VASCULAR | Facility: CLINIC | Age: 62
DRG: 271 | End: 2022-12-28
Attending: SURGERY
Payer: COMMERCIAL

## 2022-12-28 ENCOUNTER — ANESTHESIA (OUTPATIENT)
Dept: SURGERY | Facility: CLINIC | Age: 62
DRG: 271 | End: 2022-12-28
Payer: COMMERCIAL

## 2022-12-28 DIAGNOSIS — T81.718A PSEUDOANEURYSM OF FEMORAL ARTERY FOLLOWING PROCEDURE (H): ICD-10-CM

## 2022-12-28 DIAGNOSIS — I70.229 CRITICAL ISCHEMIA OF EXTREMITY WITH HISTORY OF REVASCULARIZATION OF SAME EXTREMITY (H): Primary | ICD-10-CM

## 2022-12-28 DIAGNOSIS — I72.4 PSEUDOANEURYSM OF FEMORAL ARTERY FOLLOWING PROCEDURE (H): ICD-10-CM

## 2022-12-28 DIAGNOSIS — I70.391 ATHEROSCLEROSIS OF BYPASS GRAFT OF RIGHT LOWER EXTREMITY WITH OTHER CLINICAL MANIFESTATION (H): ICD-10-CM

## 2022-12-28 DIAGNOSIS — I73.9 PAD (PERIPHERAL ARTERY DISEASE) (H): ICD-10-CM

## 2022-12-28 DIAGNOSIS — Z98.890 CRITICAL ISCHEMIA OF EXTREMITY WITH HISTORY OF REVASCULARIZATION OF SAME EXTREMITY (H): Primary | ICD-10-CM

## 2022-12-28 LAB
ABO/RH(D): NORMAL
ANTIBODY SCREEN: NEGATIVE
CREAT SERPL-MCNC: 0.62 MG/DL (ref 0.52–1.04)
GFR SERPL CREATININE-BSD FRML MDRD: >90 ML/MIN/1.73M2
SPECIMEN EXPIRATION DATE: NORMAL

## 2022-12-28 PROCEDURE — 250N000025 HC SEVOFLURANE, PER MIN: Performed by: SURGERY

## 2022-12-28 PROCEDURE — 258N000003 HC RX IP 258 OP 636: Performed by: ANESTHESIOLOGY

## 2022-12-28 PROCEDURE — 370N000017 HC ANESTHESIA TECHNICAL FEE, PER MIN: Performed by: SURGERY

## 2022-12-28 PROCEDURE — 250N000011 HC RX IP 250 OP 636: Performed by: ANESTHESIOLOGY

## 2022-12-28 PROCEDURE — C1887 CATHETER, GUIDING: HCPCS | Performed by: SURGERY

## 2022-12-28 PROCEDURE — 710N000010 HC RECOVERY PHASE 1, LEVEL 2, PER MIN: Performed by: SURGERY

## 2022-12-28 PROCEDURE — 36415 COLL VENOUS BLD VENIPUNCTURE: CPT | Performed by: STUDENT IN AN ORGANIZED HEALTH CARE EDUCATION/TRAINING PROGRAM

## 2022-12-28 PROCEDURE — X2CS3T7 EXTIRPATION OF MATTER FROM RIGHT LOWER EXTREMITY ARTERY USING COMPUTER-AIDED MECHANICAL ASPIRATION, PERCUTANEOUS APPROACH, NEW TECHNOLOGY GROUP 7: ICD-10-PCS | Performed by: RADIOLOGY

## 2022-12-28 PROCEDURE — 250N000009 HC RX 250: Performed by: NURSE ANESTHETIST, CERTIFIED REGISTERED

## 2022-12-28 PROCEDURE — 35141 REPAIR DEFECT OF ARTERY: CPT | Mod: 22 | Performed by: SURGERY

## 2022-12-28 PROCEDURE — 272N000190 HC ACCESSORY CR14

## 2022-12-28 PROCEDURE — 250N000011 HC RX IP 250 OP 636: Performed by: STUDENT IN AN ORGANIZED HEALTH CARE EDUCATION/TRAINING PROGRAM

## 2022-12-28 PROCEDURE — 272N000001 HC OR GENERAL SUPPLY STERILE: Performed by: SURGERY

## 2022-12-28 PROCEDURE — 36415 COLL VENOUS BLD VENIPUNCTURE: CPT | Performed by: SURGERY

## 2022-12-28 PROCEDURE — 82565 ASSAY OF CREATININE: CPT | Performed by: STUDENT IN AN ORGANIZED HEALTH CARE EDUCATION/TRAINING PROGRAM

## 2022-12-28 PROCEDURE — 258N000003 HC RX IP 258 OP 636: Performed by: NURSE ANESTHETIST, CERTIFIED REGISTERED

## 2022-12-28 PROCEDURE — C1894 INTRO/SHEATH, NON-LASER: HCPCS | Performed by: SURGERY

## 2022-12-28 PROCEDURE — 250N000013 HC RX MED GY IP 250 OP 250 PS 637: Performed by: STUDENT IN AN ORGANIZED HEALTH CARE EDUCATION/TRAINING PROGRAM

## 2022-12-28 PROCEDURE — C1757 CATH, THROMBECTOMY/EMBOLECT: HCPCS

## 2022-12-28 PROCEDURE — C1725 CATH, TRANSLUMIN NON-LASER: HCPCS

## 2022-12-28 PROCEDURE — 250N000009 HC RX 250: Performed by: SURGERY

## 2022-12-28 PROCEDURE — 86850 RBC ANTIBODY SCREEN: CPT | Performed by: SURGERY

## 2022-12-28 PROCEDURE — 047P3ZZ DILATION OF RIGHT ANTERIOR TIBIAL ARTERY, PERCUTANEOUS APPROACH: ICD-10-PCS | Performed by: RADIOLOGY

## 2022-12-28 PROCEDURE — C1769 GUIDE WIRE: HCPCS | Performed by: SURGERY

## 2022-12-28 PROCEDURE — 86901 BLOOD TYPING SEROLOGIC RH(D): CPT | Performed by: SURGERY

## 2022-12-28 PROCEDURE — 258N000003 HC RX IP 258 OP 636: Performed by: STUDENT IN AN ORGANIZED HEALTH CARE EDUCATION/TRAINING PROGRAM

## 2022-12-28 PROCEDURE — 37224 PR REVASCULARIZE FEM/POP ARTERY, ANGIOPLASTY: CPT | Mod: AS | Performed by: SURGERY

## 2022-12-28 PROCEDURE — 250N000012 HC RX MED GY IP 250 OP 636 PS 637: Performed by: SURGERY

## 2022-12-28 PROCEDURE — 04QL0ZZ REPAIR LEFT FEMORAL ARTERY, OPEN APPROACH: ICD-10-PCS | Performed by: SURGERY

## 2022-12-28 PROCEDURE — 250N000011 HC RX IP 250 OP 636: Performed by: NURSE ANESTHETIST, CERTIFIED REGISTERED

## 2022-12-28 PROCEDURE — 057D3ZZ DILATION OF RIGHT CEPHALIC VEIN, PERCUTANEOUS APPROACH: ICD-10-PCS | Performed by: RADIOLOGY

## 2022-12-28 PROCEDURE — 999N000141 HC STATISTIC PRE-PROCEDURE NURSING ASSESSMENT: Performed by: SURGERY

## 2022-12-28 PROCEDURE — 120N000001 HC R&B MED SURG/OB

## 2022-12-28 PROCEDURE — C1769 GUIDE WIRE: HCPCS

## 2022-12-28 PROCEDURE — 04CL0ZZ EXTIRPATION OF MATTER FROM LEFT FEMORAL ARTERY, OPEN APPROACH: ICD-10-PCS | Performed by: SURGERY

## 2022-12-28 PROCEDURE — 360N000077 HC SURGERY LEVEL 4, PER MIN: Performed by: SURGERY

## 2022-12-28 PROCEDURE — C1725 CATH, TRANSLUMIN NON-LASER: HCPCS | Performed by: SURGERY

## 2022-12-28 PROCEDURE — 250N000011 HC RX IP 250 OP 636: Performed by: SURGERY

## 2022-12-28 RX ORDER — DIPHENHYDRAMINE HYDROCHLORIDE 50 MG/ML
50 INJECTION INTRAMUSCULAR; INTRAVENOUS ONCE
Status: COMPLETED | OUTPATIENT
Start: 2022-12-28 | End: 2022-12-28

## 2022-12-28 RX ORDER — NALOXONE HYDROCHLORIDE 0.4 MG/ML
0.4 INJECTION, SOLUTION INTRAMUSCULAR; INTRAVENOUS; SUBCUTANEOUS
Status: DISCONTINUED | OUTPATIENT
Start: 2022-12-28 | End: 2022-12-30 | Stop reason: HOSPADM

## 2022-12-28 RX ORDER — HYDRALAZINE HYDROCHLORIDE 20 MG/ML
INJECTION INTRAMUSCULAR; INTRAVENOUS PRN
Status: DISCONTINUED | OUTPATIENT
Start: 2022-12-28 | End: 2022-12-28

## 2022-12-28 RX ORDER — OXYCODONE HYDROCHLORIDE 5 MG/1
5 TABLET ORAL EVERY 4 HOURS PRN
Status: DISCONTINUED | OUTPATIENT
Start: 2022-12-28 | End: 2022-12-30 | Stop reason: HOSPADM

## 2022-12-28 RX ORDER — ONDANSETRON 4 MG/1
4 TABLET, ORALLY DISINTEGRATING ORAL EVERY 30 MIN PRN
Status: DISCONTINUED | OUTPATIENT
Start: 2022-12-28 | End: 2022-12-28 | Stop reason: HOSPADM

## 2022-12-28 RX ORDER — SODIUM CHLORIDE, SODIUM LACTATE, POTASSIUM CHLORIDE, CALCIUM CHLORIDE 600; 310; 30; 20 MG/100ML; MG/100ML; MG/100ML; MG/100ML
INJECTION, SOLUTION INTRAVENOUS CONTINUOUS
Status: DISCONTINUED | OUTPATIENT
Start: 2022-12-28 | End: 2022-12-30 | Stop reason: HOSPADM

## 2022-12-28 RX ORDER — CLINDAMYCIN PHOSPHATE 900 MG/50ML
900 INJECTION, SOLUTION INTRAVENOUS
Status: COMPLETED | OUTPATIENT
Start: 2022-12-28 | End: 2022-12-28

## 2022-12-28 RX ORDER — AMLODIPINE BESYLATE 5 MG/1
5 TABLET ORAL 2 TIMES DAILY
Status: DISCONTINUED | OUTPATIENT
Start: 2022-12-28 | End: 2022-12-30 | Stop reason: HOSPADM

## 2022-12-28 RX ORDER — HEPARIN SODIUM 1000 [USP'U]/ML
INJECTION, SOLUTION INTRAVENOUS; SUBCUTANEOUS PRN
Status: DISCONTINUED | OUTPATIENT
Start: 2022-12-28 | End: 2022-12-28

## 2022-12-28 RX ORDER — HEPARIN SODIUM 10000 [USP'U]/ML
INJECTION, SOLUTION INTRAVENOUS; SUBCUTANEOUS PRN
Status: DISCONTINUED | OUTPATIENT
Start: 2022-12-28 | End: 2022-12-28 | Stop reason: HOSPADM

## 2022-12-28 RX ORDER — FENTANYL CITRATE 50 UG/ML
INJECTION, SOLUTION INTRAMUSCULAR; INTRAVENOUS PRN
Status: DISCONTINUED | OUTPATIENT
Start: 2022-12-28 | End: 2022-12-28

## 2022-12-28 RX ORDER — POLYETHYLENE GLYCOL 3350 17 G/17G
17 POWDER, FOR SOLUTION ORAL DAILY
Status: DISCONTINUED | OUTPATIENT
Start: 2022-12-29 | End: 2022-12-30 | Stop reason: HOSPADM

## 2022-12-28 RX ORDER — HYDROMORPHONE HCL IN WATER/PF 6 MG/30 ML
0.2 PATIENT CONTROLLED ANALGESIA SYRINGE INTRAVENOUS EVERY 5 MIN PRN
Status: DISCONTINUED | OUTPATIENT
Start: 2022-12-28 | End: 2022-12-28 | Stop reason: HOSPADM

## 2022-12-28 RX ORDER — BISACODYL 10 MG
10 SUPPOSITORY, RECTAL RECTAL DAILY PRN
Status: DISCONTINUED | OUTPATIENT
Start: 2022-12-28 | End: 2022-12-30 | Stop reason: HOSPADM

## 2022-12-28 RX ORDER — AMOXICILLIN 250 MG
1 CAPSULE ORAL 2 TIMES DAILY
Status: DISCONTINUED | OUTPATIENT
Start: 2022-12-28 | End: 2022-12-30 | Stop reason: HOSPADM

## 2022-12-28 RX ORDER — SODIUM CHLORIDE, SODIUM LACTATE, POTASSIUM CHLORIDE, CALCIUM CHLORIDE 600; 310; 30; 20 MG/100ML; MG/100ML; MG/100ML; MG/100ML
INJECTION, SOLUTION INTRAVENOUS CONTINUOUS
Status: DISCONTINUED | OUTPATIENT
Start: 2022-12-28 | End: 2022-12-28 | Stop reason: HOSPADM

## 2022-12-28 RX ORDER — HYDRALAZINE HYDROCHLORIDE 20 MG/ML
10 INJECTION INTRAMUSCULAR; INTRAVENOUS EVERY 4 HOURS PRN
Status: DISCONTINUED | OUTPATIENT
Start: 2022-12-28 | End: 2022-12-30 | Stop reason: HOSPADM

## 2022-12-28 RX ORDER — LISINOPRIL 20 MG/1
20 TABLET ORAL 2 TIMES DAILY
Status: DISCONTINUED | OUTPATIENT
Start: 2022-12-28 | End: 2022-12-30 | Stop reason: HOSPADM

## 2022-12-28 RX ORDER — GABAPENTIN 100 MG/1
100 CAPSULE ORAL 3 TIMES DAILY
Status: DISCONTINUED | OUTPATIENT
Start: 2022-12-28 | End: 2022-12-30 | Stop reason: HOSPADM

## 2022-12-28 RX ORDER — PROPOFOL 10 MG/ML
INJECTION, EMULSION INTRAVENOUS PRN
Status: DISCONTINUED | OUTPATIENT
Start: 2022-12-28 | End: 2022-12-28

## 2022-12-28 RX ORDER — CARVEDILOL 6.25 MG/1
6.25 TABLET ORAL 2 TIMES DAILY WITH MEALS
Status: DISCONTINUED | OUTPATIENT
Start: 2022-12-28 | End: 2022-12-30 | Stop reason: HOSPADM

## 2022-12-28 RX ORDER — CLOPIDOGREL BISULFATE 75 MG/1
75 TABLET ORAL DAILY
Status: DISCONTINUED | OUTPATIENT
Start: 2022-12-29 | End: 2022-12-30 | Stop reason: HOSPADM

## 2022-12-28 RX ORDER — ONDANSETRON 2 MG/ML
INJECTION INTRAMUSCULAR; INTRAVENOUS PRN
Status: DISCONTINUED | OUTPATIENT
Start: 2022-12-28 | End: 2022-12-28

## 2022-12-28 RX ORDER — LIDOCAINE HYDROCHLORIDE 20 MG/ML
INJECTION, SOLUTION INFILTRATION; PERINEURAL PRN
Status: DISCONTINUED | OUTPATIENT
Start: 2022-12-28 | End: 2022-12-28

## 2022-12-28 RX ORDER — ONDANSETRON 2 MG/ML
4 INJECTION INTRAMUSCULAR; INTRAVENOUS EVERY 30 MIN PRN
Status: DISCONTINUED | OUTPATIENT
Start: 2022-12-28 | End: 2022-12-28 | Stop reason: HOSPADM

## 2022-12-28 RX ORDER — LABETALOL HYDROCHLORIDE 5 MG/ML
20 INJECTION, SOLUTION INTRAVENOUS EVERY 4 HOURS PRN
Status: DISCONTINUED | OUTPATIENT
Start: 2022-12-28 | End: 2022-12-30 | Stop reason: HOSPADM

## 2022-12-28 RX ORDER — ONDANSETRON 4 MG/1
4 TABLET, ORALLY DISINTEGRATING ORAL EVERY 6 HOURS PRN
Status: DISCONTINUED | OUTPATIENT
Start: 2022-12-28 | End: 2022-12-30 | Stop reason: HOSPADM

## 2022-12-28 RX ORDER — DEXAMETHASONE SODIUM PHOSPHATE 4 MG/ML
INJECTION, SOLUTION INTRA-ARTICULAR; INTRALESIONAL; INTRAMUSCULAR; INTRAVENOUS; SOFT TISSUE PRN
Status: DISCONTINUED | OUTPATIENT
Start: 2022-12-28 | End: 2022-12-28

## 2022-12-28 RX ORDER — FENTANYL CITRATE 0.05 MG/ML
25 INJECTION, SOLUTION INTRAMUSCULAR; INTRAVENOUS EVERY 5 MIN PRN
Status: DISCONTINUED | OUTPATIENT
Start: 2022-12-28 | End: 2022-12-28 | Stop reason: HOSPADM

## 2022-12-28 RX ORDER — OXYCODONE HYDROCHLORIDE 5 MG/1
10 TABLET ORAL EVERY 4 HOURS PRN
Status: DISCONTINUED | OUTPATIENT
Start: 2022-12-28 | End: 2022-12-30 | Stop reason: HOSPADM

## 2022-12-28 RX ORDER — NEOSTIGMINE METHYLSULFATE 1 MG/ML
VIAL (ML) INJECTION PRN
Status: DISCONTINUED | OUTPATIENT
Start: 2022-12-28 | End: 2022-12-28

## 2022-12-28 RX ORDER — BUPIVACAINE HYDROCHLORIDE 5 MG/ML
INJECTION, SOLUTION PERINEURAL PRN
Status: DISCONTINUED | OUTPATIENT
Start: 2022-12-28 | End: 2022-12-28 | Stop reason: HOSPADM

## 2022-12-28 RX ORDER — ASPIRIN 81 MG/1
81 TABLET, CHEWABLE ORAL DAILY
Status: DISCONTINUED | OUTPATIENT
Start: 2022-12-29 | End: 2022-12-30 | Stop reason: HOSPADM

## 2022-12-28 RX ORDER — HYDROMORPHONE HCL IN WATER/PF 6 MG/30 ML
0.4 PATIENT CONTROLLED ANALGESIA SYRINGE INTRAVENOUS
Status: DISCONTINUED | OUTPATIENT
Start: 2022-12-28 | End: 2022-12-30 | Stop reason: HOSPADM

## 2022-12-28 RX ORDER — LIDOCAINE 40 MG/G
CREAM TOPICAL
Status: DISCONTINUED | OUTPATIENT
Start: 2022-12-28 | End: 2022-12-30 | Stop reason: HOSPADM

## 2022-12-28 RX ORDER — NITROGLYCERIN 5 MG/ML
INJECTION, SOLUTION INTRAVENOUS PRN
Status: DISCONTINUED | OUTPATIENT
Start: 2022-12-28 | End: 2022-12-28 | Stop reason: HOSPADM

## 2022-12-28 RX ORDER — DEXMEDETOMIDINE HYDROCHLORIDE 4 UG/ML
INJECTION, SOLUTION INTRAVENOUS PRN
Status: DISCONTINUED | OUTPATIENT
Start: 2022-12-28 | End: 2022-12-28

## 2022-12-28 RX ORDER — ONDANSETRON 2 MG/ML
4 INJECTION INTRAMUSCULAR; INTRAVENOUS EVERY 6 HOURS PRN
Status: DISCONTINUED | OUTPATIENT
Start: 2022-12-28 | End: 2022-12-30 | Stop reason: HOSPADM

## 2022-12-28 RX ORDER — DIPHENHYDRAMINE HCL 50 MG
50 CAPSULE ORAL ONCE
Status: DISCONTINUED | OUTPATIENT
Start: 2022-12-28 | End: 2022-12-30 | Stop reason: HOSPADM

## 2022-12-28 RX ORDER — HYDROMORPHONE HCL IN WATER/PF 6 MG/30 ML
0.2 PATIENT CONTROLLED ANALGESIA SYRINGE INTRAVENOUS
Status: DISCONTINUED | OUTPATIENT
Start: 2022-12-28 | End: 2022-12-30 | Stop reason: HOSPADM

## 2022-12-28 RX ORDER — HYDROMORPHONE HCL IN WATER/PF 6 MG/30 ML
0.4 PATIENT CONTROLLED ANALGESIA SYRINGE INTRAVENOUS EVERY 5 MIN PRN
Status: DISCONTINUED | OUTPATIENT
Start: 2022-12-28 | End: 2022-12-28 | Stop reason: HOSPADM

## 2022-12-28 RX ORDER — NALOXONE HYDROCHLORIDE 0.4 MG/ML
0.2 INJECTION, SOLUTION INTRAMUSCULAR; INTRAVENOUS; SUBCUTANEOUS
Status: DISCONTINUED | OUTPATIENT
Start: 2022-12-28 | End: 2022-12-30 | Stop reason: HOSPADM

## 2022-12-28 RX ORDER — ACETAMINOPHEN 325 MG/1
975 TABLET ORAL EVERY 8 HOURS
Status: DISCONTINUED | OUTPATIENT
Start: 2022-12-28 | End: 2022-12-30 | Stop reason: HOSPADM

## 2022-12-28 RX ORDER — ENOXAPARIN SODIUM 100 MG/ML
40 INJECTION SUBCUTANEOUS EVERY 24 HOURS
Status: DISCONTINUED | OUTPATIENT
Start: 2022-12-29 | End: 2022-12-30 | Stop reason: HOSPADM

## 2022-12-28 RX ORDER — GLYCOPYRROLATE 0.2 MG/ML
INJECTION, SOLUTION INTRAMUSCULAR; INTRAVENOUS PRN
Status: DISCONTINUED | OUTPATIENT
Start: 2022-12-28 | End: 2022-12-28

## 2022-12-28 RX ORDER — ROSUVASTATIN CALCIUM 20 MG/1
40 TABLET, COATED ORAL AT BEDTIME
Status: DISCONTINUED | OUTPATIENT
Start: 2022-12-28 | End: 2022-12-30 | Stop reason: HOSPADM

## 2022-12-28 RX ORDER — FENTANYL CITRATE 0.05 MG/ML
50 INJECTION, SOLUTION INTRAMUSCULAR; INTRAVENOUS EVERY 5 MIN PRN
Status: DISCONTINUED | OUTPATIENT
Start: 2022-12-28 | End: 2022-12-28 | Stop reason: HOSPADM

## 2022-12-28 RX ORDER — PAPAVERINE HYDROCHLORIDE 30 MG/ML
INJECTION INTRAMUSCULAR; INTRAVENOUS PRN
Status: DISCONTINUED | OUTPATIENT
Start: 2022-12-28 | End: 2022-12-28 | Stop reason: HOSPADM

## 2022-12-28 RX ORDER — PROPOFOL 10 MG/ML
INJECTION, EMULSION INTRAVENOUS CONTINUOUS PRN
Status: DISCONTINUED | OUTPATIENT
Start: 2022-12-28 | End: 2022-12-28

## 2022-12-28 RX ORDER — FLUTICASONE FUROATE AND VILANTEROL 200; 25 UG/1; UG/1
1 POWDER RESPIRATORY (INHALATION) DAILY
Status: DISCONTINUED | OUTPATIENT
Start: 2022-12-29 | End: 2022-12-30 | Stop reason: HOSPADM

## 2022-12-28 RX ADMIN — NITROGLYCERIN 100 MCG: 5 INJECTION, SOLUTION INTRAVENOUS at 16:39

## 2022-12-28 RX ADMIN — HYDRALAZINE HYDROCHLORIDE 8 MG: 20 INJECTION INTRAMUSCULAR; INTRAVENOUS at 16:33

## 2022-12-28 RX ADMIN — HYDRALAZINE HYDROCHLORIDE 4 MG: 20 INJECTION INTRAMUSCULAR; INTRAVENOUS at 16:43

## 2022-12-28 RX ADMIN — HYDROMORPHONE HYDROCHLORIDE 0.5 MG: 1 INJECTION, SOLUTION INTRAMUSCULAR; INTRAVENOUS; SUBCUTANEOUS at 16:44

## 2022-12-28 RX ADMIN — GLYCOPYRROLATE 0.1 MG: 0.2 INJECTION, SOLUTION INTRAMUSCULAR; INTRAVENOUS at 15:40

## 2022-12-28 RX ADMIN — NEOSTIGMINE METHYLSULFATE 3 MG: 1 INJECTION, SOLUTION INTRAVENOUS at 16:19

## 2022-12-28 RX ADMIN — HEPARIN SODIUM 3000 UNITS: 1000 INJECTION INTRAVENOUS; SUBCUTANEOUS at 15:17

## 2022-12-28 RX ADMIN — ONDANSETRON 4 MG: 2 INJECTION INTRAMUSCULAR; INTRAVENOUS at 15:35

## 2022-12-28 RX ADMIN — SODIUM CHLORIDE, POTASSIUM CHLORIDE, SODIUM LACTATE AND CALCIUM CHLORIDE: 600; 310; 30; 20 INJECTION, SOLUTION INTRAVENOUS at 18:59

## 2022-12-28 RX ADMIN — CARVEDILOL 6.25 MG: 6.25 TABLET, FILM COATED ORAL at 18:58

## 2022-12-28 RX ADMIN — DEXMEDETOMIDINE HYDROCHLORIDE 8 MCG: 200 INJECTION INTRAVENOUS at 13:57

## 2022-12-28 RX ADMIN — DEXAMETHASONE SODIUM PHOSPHATE 4 MG: 4 INJECTION, SOLUTION INTRA-ARTICULAR; INTRALESIONAL; INTRAMUSCULAR; INTRAVENOUS; SOFT TISSUE at 13:19

## 2022-12-28 RX ADMIN — FENTANYL CITRATE 100 MCG: 50 INJECTION, SOLUTION INTRAMUSCULAR; INTRAVENOUS at 13:06

## 2022-12-28 RX ADMIN — HEPARIN SODIUM 3000 UNITS: 1000 INJECTION INTRAVENOUS; SUBCUTANEOUS at 14:47

## 2022-12-28 RX ADMIN — PROPOFOL 180 MG: 10 INJECTION, EMULSION INTRAVENOUS at 13:06

## 2022-12-28 RX ADMIN — ROCURONIUM BROMIDE 10 MG: 50 INJECTION, SOLUTION INTRAVENOUS at 14:32

## 2022-12-28 RX ADMIN — MIDAZOLAM 2 MG: 1 INJECTION INTRAMUSCULAR; INTRAVENOUS at 12:55

## 2022-12-28 RX ADMIN — SODIUM CHLORIDE, POTASSIUM CHLORIDE, SODIUM LACTATE AND CALCIUM CHLORIDE: 600; 310; 30; 20 INJECTION, SOLUTION INTRAVENOUS at 16:34

## 2022-12-28 RX ADMIN — GABAPENTIN 100 MG: 100 CAPSULE ORAL at 19:01

## 2022-12-28 RX ADMIN — SODIUM CHLORIDE, POTASSIUM CHLORIDE, SODIUM LACTATE AND CALCIUM CHLORIDE: 600; 310; 30; 20 INJECTION, SOLUTION INTRAVENOUS at 12:07

## 2022-12-28 RX ADMIN — PROPOFOL 10 MG: 10 INJECTION, EMULSION INTRAVENOUS at 16:07

## 2022-12-28 RX ADMIN — ROCURONIUM BROMIDE 50 MG: 50 INJECTION, SOLUTION INTRAVENOUS at 13:07

## 2022-12-28 RX ADMIN — PHENYLEPHRINE HYDROCHLORIDE 100 MCG: 10 INJECTION INTRAVENOUS at 15:59

## 2022-12-28 RX ADMIN — CLINDAMYCIN PHOSPHATE 900 MG: 900 INJECTION, SOLUTION INTRAVENOUS at 12:24

## 2022-12-28 RX ADMIN — SENNOSIDES AND DOCUSATE SODIUM 1 TABLET: 50; 8.6 TABLET ORAL at 20:41

## 2022-12-28 RX ADMIN — FENTANYL CITRATE 50 MCG: 50 INJECTION, SOLUTION INTRAMUSCULAR; INTRAVENOUS at 17:00

## 2022-12-28 RX ADMIN — NITROGLYCERIN 75 MCG: 5 INJECTION, SOLUTION INTRAVENOUS at 16:43

## 2022-12-28 RX ADMIN — ROSUVASTATIN CALCIUM 40 MG: 20 TABLET, FILM COATED ORAL at 22:12

## 2022-12-28 RX ADMIN — DIPHENHYDRAMINE HYDROCHLORIDE 50 MG: 50 INJECTION, SOLUTION INTRAMUSCULAR; INTRAVENOUS at 12:48

## 2022-12-28 RX ADMIN — PHENYLEPHRINE HYDROCHLORIDE 0.3 MCG/KG/MIN: 10 INJECTION INTRAVENOUS at 13:14

## 2022-12-28 RX ADMIN — GLYCOPYRROLATE 0.1 MG: 0.2 INJECTION, SOLUTION INTRAMUSCULAR; INTRAVENOUS at 13:26

## 2022-12-28 RX ADMIN — GLYCOPYRROLATE 0.2 MG: 0.2 INJECTION, SOLUTION INTRAMUSCULAR; INTRAVENOUS at 16:19

## 2022-12-28 RX ADMIN — HEPARIN SODIUM 3000 UNITS: 1000 INJECTION INTRAVENOUS; SUBCUTANEOUS at 14:12

## 2022-12-28 RX ADMIN — PROPOFOL 20 MG: 10 INJECTION, EMULSION INTRAVENOUS at 14:36

## 2022-12-28 RX ADMIN — PROPOFOL 20 MG: 10 INJECTION, EMULSION INTRAVENOUS at 14:09

## 2022-12-28 RX ADMIN — ACETAMINOPHEN 975 MG: 325 TABLET, FILM COATED ORAL at 18:55

## 2022-12-28 RX ADMIN — HYDROMORPHONE HYDROCHLORIDE 0.2 MG: 0.2 INJECTION, SOLUTION INTRAMUSCULAR; INTRAVENOUS; SUBCUTANEOUS at 22:12

## 2022-12-28 RX ADMIN — PHENYLEPHRINE HYDROCHLORIDE 100 MCG: 10 INJECTION INTRAVENOUS at 15:49

## 2022-12-28 RX ADMIN — HYDROMORPHONE HYDROCHLORIDE 0.5 MG: 1 INJECTION, SOLUTION INTRAMUSCULAR; INTRAVENOUS; SUBCUTANEOUS at 13:31

## 2022-12-28 RX ADMIN — OMEPRAZOLE 20 MG: 20 CAPSULE, DELAYED RELEASE ORAL at 20:41

## 2022-12-28 RX ADMIN — LIDOCAINE HYDROCHLORIDE 100 MG: 20 INJECTION, SOLUTION INFILTRATION; PERINEURAL at 13:06

## 2022-12-28 RX ADMIN — HYDRALAZINE HYDROCHLORIDE 4 MG: 20 INJECTION INTRAMUSCULAR; INTRAVENOUS at 16:38

## 2022-12-28 RX ADMIN — PROPOFOL 20 MCG/KG/MIN: 10 INJECTION, EMULSION INTRAVENOUS at 13:31

## 2022-12-28 RX ADMIN — HYDROMORPHONE HYDROCHLORIDE 0.4 MG: 0.2 INJECTION, SOLUTION INTRAMUSCULAR; INTRAVENOUS; SUBCUTANEOUS at 17:21

## 2022-12-28 RX ADMIN — FENTANYL CITRATE 50 MCG: 50 INJECTION, SOLUTION INTRAMUSCULAR; INTRAVENOUS at 17:05

## 2022-12-28 RX ADMIN — LISINOPRIL 20 MG: 20 TABLET ORAL at 20:41

## 2022-12-28 RX ADMIN — AMLODIPINE BESYLATE 5 MG: 5 TABLET ORAL at 19:01

## 2022-12-28 RX ADMIN — PROPOFOL 20 MG: 10 INJECTION, EMULSION INTRAVENOUS at 13:58

## 2022-12-28 ASSESSMENT — ACTIVITIES OF DAILY LIVING (ADL)
ADLS_ACUITY_SCORE: 18

## 2022-12-28 NOTE — INTERVAL H&P NOTE
I have reviewed the surgical (or preoperative) H&P that is linked to this encounter, and examined the patient. There are no significant changes    Clinical Conditions Present on Arrival:  Clinically Significant Risk Factors Present on Admission           # Hyponatremia: Lowest Na = 132 mmol/L in last 30 days, will monitor as appropriate         # Drug Induced Platelet Defect: home medication list includes an antiplatelet medication

## 2022-12-28 NOTE — ANESTHESIA PROCEDURE NOTES
Airway       Patient location: [facility] - Operating Room or Procedural Area.       Procedure Start/Stop Times: 12/28/2022 1:08 PM  Staff -        Anesthesiologist:  Randal Garcia MD       CRNA: Jacoby Salazar APRN CRNA       Performed By: CRNAIndications and Patient Condition       Indications for airway management: lydia-procedural and airway protection       Induction type:intravenous       Mask difficulty assessment: 1 - vent by mask    Final Airway Details       Final airway type: endotracheal airway       Successful airway: ETT - single  Endotracheal Airway Details        ETT size (mm): 7.0       Cuffed: yes       Cuff volume (mL): 8       Successful intubation technique: video laryngoscopy       VL Blade Size: John 3       Grade View of Cords: 1       Adjucts: stylet       Position: Right       Measured from: lips       Secured at (cm): 21       Bite block used: None    Post intubation assessment        Placement verified by: capnometry, equal breath sounds and chest rise        Number of attempts at approach: 1       Number of other approaches attempted: 0       Secured with: pink tape       Ease of procedure: easy       Dentition: Intact and Unchanged    Medication(s) Administered   Medication Administration Time: 12/28/2022 1:08 PM

## 2022-12-28 NOTE — ANESTHESIA CARE TRANSFER NOTE
Patient: Shirley Hendircks    Procedure: Procedure(s):  REPAIR LEFT FEMORAL PSEUDOANEURYSM  RIGHT LEG ANGIOGRAM with intervention       Diagnosis: PAD (peripheral artery disease) (H) [I73.9]  Pseudoaneurysm of femoral artery following procedure (H) [T81.718A, I72.4]  Diagnosis Additional Information: No value filed.    Anesthesia Type:   General     Note:    Oropharynx: oropharynx clear of all foreign objects and spontaneously breathing  Level of Consciousness: awake  Oxygen Supplementation: face mask  Level of Supplemental Oxygen (L/min / FiO2): 6  Independent Airway: airway patency satisfactory and stable  Dentition: dentition unchanged    Report to RN Given: handoff report given  Patient transferred to: PACU  Comments: Patient very hypertensive post-operatively. Treated with Hydralazine and Nitroglycerine. Report given to PACU RN.   Handoff Report: Identifed the Patient, Identified the Reponsible Provider, Reviewed the pertinent medical history, Discussed the surgical course, Reviewed Intra-OP anesthesia mangement and issues during anesthesia, Set expectations for post-procedure period and Allowed opportunity for questions and acknowledgement of understanding      Vitals:  Vitals Value Taken Time   /84 12/28/22 1648   Temp     Pulse 61 12/28/22 1653   Resp 7 12/28/22 1653   SpO2 100 % 12/28/22 1653   Vitals shown include unvalidated device data.    Electronically Signed By: NOELLE Toscano CRNA  December 28, 2022  4:55 PM

## 2022-12-28 NOTE — ANESTHESIA POSTPROCEDURE EVALUATION
Patient: Shirley Hendricks    Procedure: Procedure(s):  REPAIR LEFT FEMORAL PSEUDOANEURYSM  RIGHT LEG ANGIOGRAM with intervention       Anesthesia Type:  General    Note:  Disposition: Inpatient   Postop Pain Control: Uneventful            Sign Out: Well controlled pain   PONV: No   Neuro/Psych: Uneventful            Sign Out: Acceptable/Baseline neuro status   Airway/Respiratory: Uneventful            Sign Out: Acceptable/Baseline resp. status   CV/Hemodynamics: Uneventful            Sign Out: Detailed CV status               Blood Pressure: Normal               Rate/Rhythm: Normal HR               Perfusion:  Adequate perfusion indices   Other NRE: NONE   DID A NON-ROUTINE EVENT OCCUR? No    Event details/Postop Comments:  Patient woke up with very high systolic and diastolic blood pressures. Treated with nitroglycerin and Hydralazine. BP normal in the PACU.            Last vitals:  Vitals Value Taken Time   /64 12/28/22 1707   Temp 36  C (96.8  F) 12/28/22 1648   Pulse 48 12/28/22 1715   Resp 7 12/28/22 1715   SpO2 96 % 12/28/22 1715   Vitals shown include unvalidated device data.    Electronically Signed By: Randal Garcia MD  December 28, 2022  5:16 PM

## 2022-12-28 NOTE — BRIEF OP NOTE
[facility]    Brief Operative Note    Pre-operative diagnosis: PAD (peripheral artery disease) (H) [I73.9]  Pseudoaneurysm of femoral artery following procedure (H) [T81.718A, I72.4]  Post-operative diagnosis Same as pre-operative diagnosis    Procedure: Procedure(s):  REPAIR LEFT FEMORAL PSEUDOANEURYSM  RIGHT LEG ANGIOGRAM with intervention  Surgeon: Surgeon(s) and Role:     * Chadwick Lozano MD - Primary     * Maxx Vogel MD - Assisting     * Yamil Villagomez MD - Resident - Assisting  Anesthesia: General   Estimated Blood Loss: 45 mL from 12/28/2022 12:55 PM to 12/28/2022  4:29 PM      Drains: None  Specimens: * No specimens in log *  Findings:   1. Pseudoaneurysm of the lateral wall of the left in-situ fem-pop bypass proximal anastomosis, repair primarily  2. Plain balloon angioplasty of distal right fem-TP trunk bypass graft (5 mm balloon), tibioperoneal trunk (4 mm balloon), and anterior tibial artery (3 mm)  3. Emoblic occlusion seen in the mid AT after angioplasty, thrombus removed with penumbra. Very short segment of vasospasm in the mid-AT remained  4. Palpable dorsalis pedis pulses at end of case      Complications: None.  Implants: * No implants in log *

## 2022-12-28 NOTE — ANESTHESIA PROCEDURE NOTES
Arterial Line Procedure Note    Pre-Procedure   Staff -        Anesthesiologist:  Randal Garcia MD       Performed By: anesthesiologist       Location: pre-op       Pre-Anesthestic Checklist: patient identified, IV checked, risks and benefits discussed, informed consent, monitors and equipment checked and pre-op evaluation  Timeout:       Correct Patient: Yes        Correct Procedure: Yes        Correct Site: Yes        Correct Position: Yes   Line Placement:   This line was placed Pre Induction starting at 12/28/2022 1:00 PM and ending at 12/28/2022 1:00 PM  Procedure   Procedure: arterial line       Laterality: right       Insertion Site: brachial.  Sterile Prep        Standard elements of sterile barrier followed       Skin prep: Chloraprep  Insertion/Injection        Technique: ultrasound guided        1. Ultrasound was used to evaluate the access site.       2. Artery evaluated via ultrasound for patency/adequacy.       3. Using real-time ultrasound the needle/catheter was observed entering the artery/vein.       5. The visualized structures were anatomically normal.       6. There were no apparent abnormal pathologic findings.       Catheter Type/Size: 20 G, 1.75 in/4.5 cm quick cath (integral wire)  Narrative         Secured by: other       Tegaderm dressing used.       Complications: None apparent,        Arterial waveform: Yes    Comments:  Arterial line secured with a Tegaderm and tape. Sterile gloves, mask, hat, and sterile drape utilized. No IMAGES SAVED

## 2022-12-28 NOTE — PROGRESS NOTES
PTA medications updated by Medication Scribe prior to surgery via phone call with patient (last doses completed by Nurse)     Medication history sources: Patient, Surescripts and H&P  In the past week, patient estimated taking medication this percent of the time: Greater than 90%  Adherence assessment: N/A Not Observed    Significant changes made to the medication list:  None      Additional medication history information:   Patient was advised to bring: Breo Ellipta Inhaler    Medication reconciliation completed by provider prior to medication history? No    Time spent in this activity: 30 minutes    The information provided in this note is only as accurate as the sources available at the time of update(s)    Prior to Admission medications    Medication Sig Last Dose Taking? Auth Provider Long Term End Date   acetaminophen (TYLENOL) 500 MG tablet Take 500-1,000 mg by mouth every 6 hours as needed for mild pain  at PRN Yes Reported, Patient     amLODIPine (NORVASC) 5 MG tablet Take 1 tablet (5 mg) by mouth 2 times daily MORE THAN A WEEK at UNKNOWN Yes Vasquez Benoit MD Yes    aspirin (ASA) 81 MG chewable tablet Take 1 tablet (81 mg) by mouth daily 12/23/2022 at AM Yes Chadwick Lozano MD     BREO ELLIPTA 200-25 MCG/ACT inhaler Inhale 1 puff into the lungs daily 12/28/2022 at AM Yes Vasquez Benoit MD     Calcium Carb-Cholecalciferol (CALCIUM CARBONATE-VITAMIN D3) 600-400 MG-UNIT TABS TAKE ONE TABLET BY MOUTH TWICE DAILY 12/28/2022 at AM Yes Vasquez Benoit MD     carvedilol (COREG) 6.25 MG tablet TAKE ONE TABLET BY MOUTH TWICE A DAY WITH MEALS 12/28/2022 at AM Yes Vsaquez Benoit MD Yes    clopidogrel (PLAVIX) 75 MG tablet Take 1 tablet (75 mg) by mouth daily 12/23/2022 at AM Yes Vasquez Benoit MD Yes    denosumab (PROLIA) 60 MG/ML SOLN injection Inject 1 mL (60 mg) Subcutaneous every 6 months INDICATION: TO TREAT OSTEOPOROSIS Unknown at Unknown Yes Vasquez Benoit MD Yes    diphenhydrAMINE (BENADRYL) 25 MG tablet take  Benadryl 25-50 mg one hour prior to the procedure 12/28/2022 at AM Yes Chadwick Lozano MD     gabapentin (NEURONTIN) 100 MG capsule Take 1 capsule (100 mg) by mouth 3 times daily 12/28/2022 at AM Yes Kaylee Liu NP Yes    lisinopril (ZESTRIL) 20 MG tablet Take 1 tablet (20 mg) by mouth 2 times daily. 12/28/2022 at AM Yes Vasquez Benoit MD Yes    methylPREDNISolone (MEDROL) 16 MG tablet Take two tablets (32mg) Medrol by mouth 12 hours before procedure and repeat two tablets (32mg) by mouth 2 hours before procedure to prevent contrast reaction. If you have had an allergic reaction (hives) after being pre-treated with Medrol, you will also need to take Benadryl 25-50 mg one hour prior to the procedure. 12/28/2022 at AM Yes Chadwick Lozano MD     nitroGLYcerin (NITROSTAT) 0.4 MG sublingual tablet Place 1 tablet (0.4 mg) under the tongue See Admin Instructions for chest pain Unknown at PRN Yes Vasquez Benoit MD Yes    omeprazole (PRILOSEC) 20 MG DR capsule TAKE ONE CAPSULE BY MOUTH DAILY 12/28/2022 at AM Yes Vasquez Benoit MD     rosuvastatin (CRESTOR) 40 MG tablet Take 1 tablet (40 mg) by mouth At Bedtime 12/27/2022 at PM Yes Vasquez Benoit MD Yes      Medication history completed by:    Jonah El CPhT  Medication [facility]

## 2022-12-29 LAB
ANION GAP SERPL CALCULATED.3IONS-SCNC: 8 MMOL/L (ref 3–14)
BUN SERPL-MCNC: 17 MG/DL (ref 7–30)
CALCIUM SERPL-MCNC: 7.9 MG/DL (ref 8.5–10.1)
CHLORIDE BLD-SCNC: 98 MMOL/L (ref 94–109)
CO2 SERPL-SCNC: 24 MMOL/L (ref 20–32)
CREAT SERPL-MCNC: 0.69 MG/DL (ref 0.52–1.04)
ERYTHROCYTE [DISTWIDTH] IN BLOOD BY AUTOMATED COUNT: 14.6 % (ref 10–15)
GFR SERPL CREATININE-BSD FRML MDRD: >90 ML/MIN/1.73M2
GLUCOSE BLD-MCNC: 93 MG/DL (ref 70–99)
HCT VFR BLD AUTO: 30.8 % (ref 35–47)
HGB BLD-MCNC: 10.1 G/DL (ref 11.7–15.7)
MCH RBC QN AUTO: 29 PG (ref 26.5–33)
MCHC RBC AUTO-ENTMCNC: 32.8 G/DL (ref 31.5–36.5)
MCV RBC AUTO: 89 FL (ref 78–100)
PLATELET # BLD AUTO: 163 10E3/UL (ref 150–450)
POTASSIUM BLD-SCNC: 4.3 MMOL/L (ref 3.4–5.3)
RBC # BLD AUTO: 3.48 10E6/UL (ref 3.8–5.2)
SODIUM SERPL-SCNC: 130 MMOL/L (ref 133–144)
WBC # BLD AUTO: 9.9 10E3/UL (ref 4–11)

## 2022-12-29 PROCEDURE — 80048 BASIC METABOLIC PNL TOTAL CA: CPT | Performed by: STUDENT IN AN ORGANIZED HEALTH CARE EDUCATION/TRAINING PROGRAM

## 2022-12-29 PROCEDURE — 36415 COLL VENOUS BLD VENIPUNCTURE: CPT | Performed by: STUDENT IN AN ORGANIZED HEALTH CARE EDUCATION/TRAINING PROGRAM

## 2022-12-29 PROCEDURE — 250N000013 HC RX MED GY IP 250 OP 250 PS 637: Performed by: STUDENT IN AN ORGANIZED HEALTH CARE EDUCATION/TRAINING PROGRAM

## 2022-12-29 PROCEDURE — 99024 POSTOP FOLLOW-UP VISIT: CPT

## 2022-12-29 PROCEDURE — 85027 COMPLETE CBC AUTOMATED: CPT | Performed by: STUDENT IN AN ORGANIZED HEALTH CARE EDUCATION/TRAINING PROGRAM

## 2022-12-29 PROCEDURE — 120N000001 HC R&B MED SURG/OB

## 2022-12-29 PROCEDURE — 250N000011 HC RX IP 250 OP 636: Performed by: STUDENT IN AN ORGANIZED HEALTH CARE EDUCATION/TRAINING PROGRAM

## 2022-12-29 RX ORDER — ACETAMINOPHEN 325 MG/1
975 TABLET ORAL EVERY 8 HOURS
Qty: 63 TABLET | Refills: 0 | Status: SHIPPED | OUTPATIENT
Start: 2022-12-29 | End: 2023-01-05

## 2022-12-29 RX ADMIN — SENNOSIDES AND DOCUSATE SODIUM 1 TABLET: 50; 8.6 TABLET ORAL at 20:17

## 2022-12-29 RX ADMIN — ACETAMINOPHEN 975 MG: 325 TABLET, FILM COATED ORAL at 18:30

## 2022-12-29 RX ADMIN — LISINOPRIL 20 MG: 20 TABLET ORAL at 20:18

## 2022-12-29 RX ADMIN — NICOTINE 1 PATCH: 7 PATCH, EXTENDED RELEASE TRANSDERMAL at 11:25

## 2022-12-29 RX ADMIN — ACETAMINOPHEN 975 MG: 325 TABLET, FILM COATED ORAL at 10:25

## 2022-12-29 RX ADMIN — OXYCODONE HYDROCHLORIDE 10 MG: 5 TABLET ORAL at 22:53

## 2022-12-29 RX ADMIN — AMLODIPINE BESYLATE 5 MG: 5 TABLET ORAL at 08:51

## 2022-12-29 RX ADMIN — GABAPENTIN 100 MG: 100 CAPSULE ORAL at 08:48

## 2022-12-29 RX ADMIN — ASPIRIN 81 MG CHEWABLE TABLET 81 MG: 81 TABLET CHEWABLE at 08:48

## 2022-12-29 RX ADMIN — OXYCODONE HYDROCHLORIDE 10 MG: 5 TABLET ORAL at 18:30

## 2022-12-29 RX ADMIN — GABAPENTIN 100 MG: 100 CAPSULE ORAL at 13:34

## 2022-12-29 RX ADMIN — OMEPRAZOLE 20 MG: 20 CAPSULE, DELAYED RELEASE ORAL at 20:17

## 2022-12-29 RX ADMIN — SENNOSIDES AND DOCUSATE SODIUM 1 TABLET: 50; 8.6 TABLET ORAL at 08:48

## 2022-12-29 RX ADMIN — CLOPIDOGREL BISULFATE 75 MG: 75 TABLET ORAL at 08:48

## 2022-12-29 RX ADMIN — POLYETHYLENE GLYCOL 3350 17 G: 17 POWDER, FOR SOLUTION ORAL at 08:52

## 2022-12-29 RX ADMIN — OXYCODONE HYDROCHLORIDE 10 MG: 5 TABLET ORAL at 08:51

## 2022-12-29 RX ADMIN — HYDROMORPHONE HYDROCHLORIDE 0.4 MG: 0.2 INJECTION, SOLUTION INTRAMUSCULAR; INTRAVENOUS; SUBCUTANEOUS at 05:09

## 2022-12-29 RX ADMIN — GABAPENTIN 100 MG: 100 CAPSULE ORAL at 20:18

## 2022-12-29 RX ADMIN — OXYCODONE HYDROCHLORIDE 10 MG: 5 TABLET ORAL at 13:54

## 2022-12-29 RX ADMIN — ENOXAPARIN SODIUM 40 MG: 40 INJECTION SUBCUTANEOUS at 10:25

## 2022-12-29 RX ADMIN — HYDROMORPHONE HYDROCHLORIDE 0.4 MG: 0.2 INJECTION, SOLUTION INTRAMUSCULAR; INTRAVENOUS; SUBCUTANEOUS at 01:38

## 2022-12-29 RX ADMIN — HYDROMORPHONE HYDROCHLORIDE 0.2 MG: 0.2 INJECTION, SOLUTION INTRAMUSCULAR; INTRAVENOUS; SUBCUTANEOUS at 11:21

## 2022-12-29 RX ADMIN — LISINOPRIL 20 MG: 20 TABLET ORAL at 08:51

## 2022-12-29 RX ADMIN — AMLODIPINE BESYLATE 5 MG: 5 TABLET ORAL at 20:17

## 2022-12-29 RX ADMIN — ROSUVASTATIN CALCIUM 40 MG: 20 TABLET, FILM COATED ORAL at 22:54

## 2022-12-29 ASSESSMENT — ACTIVITIES OF DAILY LIVING (ADL)
ADLS_ACUITY_SCORE: 22
ADLS_ACUITY_SCORE: 20
ADLS_ACUITY_SCORE: 22
ADLS_ACUITY_SCORE: 20
ADLS_ACUITY_SCORE: 22
ADLS_ACUITY_SCORE: 22
ADLS_ACUITY_SCORE: 18
ADLS_ACUITY_SCORE: 20

## 2022-12-29 NOTE — OP NOTE
Procedure Date: 12/28/2022    PREOPERATIVE DIAGNOSES:    1.  Left common femoral pseudoaneurysm following aortobifemoral bypass graft.  2.  Stenosis x 2 of right cephalic vein bypass to below-knee distal popliteal artery - (cadaveric SFA above-knee graft) with critical anterior tibial outflow stenosis.    POSTOPERATIVE DIAGNOSES:    1.  Left common femoral pseudoaneurysm following aortobifemoral bypass graft.  2.  Stenosis x 2 right cephalic vein bypass to below-knee distal popliteal artery --(from cadaveric SFA above-knee graft) with critical anterior tibial outflow stenosis.    PROCEDURES:    1.  Left femoral vascular exposure with excision and repair primarily of left common femoral pseudoaneurysm.  2.  Open exposure right proximal thigh cadaveric SFA graft.  A.  Right leg angiogram.  B.  Angioplasty of popliteal cephalic vein graft stenosis x 2.   C. Angioplasty of critical distal graft stenosis going to AT artery.  D. Treatment of anterior tibial thrombus with Penumbra device and      intra-arterial papaverine/nitroglycerin injection  (Dr Zepeda-VICTOR M)                              (D-10).    ANESTHESIA:  General.    PREOPERATIVE MEDICATIONS:    1.  Ancef 2 grams IV.    2.   Preoperative oral prednisone and IV Benadryl (contrast allergy)    SURGEON:  Chadwick Lozano MD    FIRST ASSISTANT:    1.  Yamil Villagomez (Vascular Fellow).  2.  Dr. Maxx Vogel of Interventional Radiology.    INDICATIONS FOR PROCEDURE:  A 62-year-old patient had undergone an aortobifemoral bypass graft with a left femoral to above knee popliteal in situ bypass graft on 03/17/2010.  More recently, she has undergone a right femoral to above knee popliteal cadaveric SFA bypass graft followed by a cephalic vein jump graft from the distal above the SFA graft to the distal popliteal artery.  Duplex ultrasound has revealed 2 moderately worsening stenosis within the cephalic vein bypass graft but also a very critical stenosis within the anterior  tibial artery outflow, which is her only runoff.  We felt that this indicated an intraoperative angiogram and possible angioplasty or revision.  During a recent evaluation, we found a pseudoaneurysm of the left common femoral artery not previously appreciated measuring 3.4 x 2.9 x 2.3 cm.  The distal common femoral to above knee popliteal bypass graft is widely patent.  It is felt that repair of the pseudoaneurysm was also indicated.    DESCRIPTION OF PROCEDURE:  Preinduction we marked the easily palpable right proximal thigh superficial femoral artery cadaveric bypass on this thin patient.  Also marked the left femoral pseudoaneurysm and the left femoral popliteal in situ bypass graft.    The patient was brought to the operating room, induced under general anesthesia, went without difficulty.  Alvarez catheter was placed.  Calf pneumatic compression boot was placed on the left.  The left thigh and groin along with lower abdomen, and the entire right leg were prepped and draped.  Timeout was called and the sites were identified.    VASCULAR EXPOSURE OF SUPERFICIAL FEMORAL ARTERY CADAVERIC GRAFT:  In the proximal thigh, we made a vertical incision with a 15 blade scalpel.  Electrocautery was used to dissect down until we identified the cadaveric SFA graft that measured almost 8 mm in diameter and had an excellent pulse.  Proximal and distal Silastic vessel loops were placed.    REPAIR LEFT FEMORAL PSEUDOANEURYSM:  An oblique incision was made in the left groin.  Dissection was carried down through a moderate amount of scar tissue.  We identified the left femoral limb of the aortobifemoral Dacron graft and this was dissected free to allow placement of the vascular clamp.  We could identify the pseudoaneurysm.  We cautiously dissected around this.  We then identified the in situ bypass graft and this was dissected free and encircled with Silastic vessel loop.  3000 units of intravenous heparin were given.  After 5  minutes, a vascular clamp was applied to the graft limb and the vessel loop distally.  We entered the pseudoaneurysm and removed a large amount of thrombus.  We identified the Dacron graft and we could see that the lateral wall had broken down causing the pseudoaneurysm.  There was thrombus within the lumen and this was removed.  All thrombus at the orifice of the in situ bypass graft was removed and this was copiously flushed with saline solution.  The anterior/medial/posterior walls of the Dacron graft were well incorporated.  We had redundancy of the common femoral artery.  We felt we could safely primarily repair the common femoral lateral wall without affecting the blood flow to the in situ bypass graft.  We had no bleeding from the profundus femoral branches, which were likely occluded.  Using a running proximal and distal 4-0 Prolene suture, we closed the wall of the common femoral artery incorporating the graft very easily with no narrowing.  Several interrupted Prolene sutures were also placed.  Prior to complete closure, the vessels were flushed.  Blood flow was allowed to go down the bypass graft with an excellent palpable pulse being noted and good hemostasis.    There was a slight redundancy over the hanson of the bypass graft and this was reefed with two 4-0 Prolene sutures with the overlying scar tissue.  We then closed the groin in multiple layers with interrupted 3-0 Vicryl suture.  Skin was closed with 4-0 Monocryl in subcuticular fashion.    RIGHT LEG ANGIOGRAM:  An additional 3000 units intravenous heparin given.  We accessed the superficial femoral artery between vessel loops followed by a guidewire and a 6-[place] sheath.  At this time, Dr. Vogel came to perform angiography as dictated in his separate operative report.  The femoral to above knee popliteal cadaveric graft looked excellent but had only retrograde flow via collaterals due to occlusion of the above knee popliteal artery.  The  cephalic graft below the tibial plateau had 2  moderate areas of stenosis and a critical stenosis going into the only runoff anterior tibial artery.  With some manipulation, guidewires were able to be passed to the anterior tibial artery.    Additional 3000 units of intravenous heparin were given (total of 9000 units intravenously).  We angioplastied the 2 stenoses with good results with a 4 followed by 6 mm balloon.  We angioplastied the outflow stenosis to the anterior tibial with a 4 mm balloon.    However, there was no extravasation but we noticed the anterior tibial artery had gone into spasm.  A sheath was placed over the guidewires into the cephalic vein distal graft and 60 mg papaverine diluted with saline solution was injected.  This did improve the spasm of the anterior tibial but still present.  Dr. Vogel used a 2.5 mm balloon in the anterior tibial artery with a prolonged inflation to relieve the spasm.  However, we noticed on followup angiograms that there was likely an embolic occlusion of the mid anterior tibial artery for a length of about 4 cm.  Over the wires, a Penumbra device was then used to remove some of this thrombus.  Nitroglycerin infusions were given down into the distal graft and the anterior tibial artery.  Final completion angiogram revealed resolution of the thrombus and spasm with only a slight area being noted, otherwise a widely patent good size anterior tibial artery.  Sites of angioplasty looked excellent.  We had an excellent palpable pulse within the bypass graft below the knee and the foot was warm and pink.    We removed our sheath controlling the SFA artery with vessel loops and repaired the sheath arteriotomy with two 6-0 Prolene mattress sutures.  Good hemostasis was noted and we had an excellent distal pulse.  This wound was closed in layers with interrupted 3-0 Vicryl suture and the skin with 4-0 Monocryl in subcuticular fashion.    All wounds were infiltrated with  0.5% Marcaine for postoperative analgesia.  Surgical adhesive was applied to both wounds followed by Tegaderm.    ESTIMATED BLOOD LOSS:  Less than 30 mL.     COMPLICATIONS:    1.  Embolus following angioplasty of right anterior tibial critical stenosis.  2.  Anterior tibial artery embolus removed with Penumbra device and treatment of vasospasm.    The procedure was more difficult due to the multiple manipulations required for the angiogram.  I participated in the entire procedure including the angiographic and angioplasty procedure with Dr. Vogel.  Total operative time was 4 hours.    Chadwick Lozano MD        D: 2022   T: 2022   MT: [email or web address]    Name:     KASIA WAN  MRN:      [id number]        Account:        [id number]   :      1960           Procedure Date: 2022     Document: [id number]    cc:  Chadwick Lozano MD

## 2022-12-29 NOTE — PROGRESS NOTES
"VASCULAR SURGERY PROGRESS NOTE    Subjective:  Patient is resting comfortably in bed. Reports intermittent pain, prednominately in the R and L groin, however managed adequately with pain medication.     Objective:  Intake/Output Summary (Last 24 hours) at 12/29/2022 1011  Last data filed at 12/29/2022 0600  Gross per 24 hour   Intake 2500 ml   Output 770 ml   Net 1730 ml     PHYSICAL EXAM:  BP (!) 140/54 (BP Location: Right arm)   Pulse 55   Temp 98  F (36.7  C) (Oral)   Resp 16   Ht 1.575 m (5' 2\")   Wt 51.1 kg (112 lb 11.2 oz)   LMP  (LMP Unknown)   SpO2 93%   BMI 20.61 kg/m    VSS on RA  Mild swelling to L groin, however stable since 12/28  Faint palpable pulse on L DP, 2+ palpable on the R side  Regular diet    ASSESSMENT:  Ms. Hendricks is a 62 year old who is POD#1 s/p left common femoral pseudoaneurysm repair.       PLAN:  -discontinue leger  -up out of bed  -continue current medication management  -discharge on 12/30   -continue current pain regimen  -regular diet    Kaylee Liu NP  VASCULAR SURGERY                   "

## 2022-12-29 NOTE — PLAN OF CARE
POD 1, A&Ox4. VSS exc HTN and christel at times. Bilateral groin pain controlled w/ tylenol, oxy, and dilaudid x1. 2 groin sites, L groin site mildly swollen, slight bruising around incisions. L DP pulse palpable +1, R palpable +3. Alvarez removed @ 1030 this AM, voiding adequatey. Up w/ Ax1 GB WK, ambulated around halls today multiple times. Tolerating regular diet.

## 2022-12-29 NOTE — PLAN OF CARE
Settled at 1840: POD 0, A&Ox4, lethargic. VSS exc HTN and RR between 8-10. Severe pain controlled w/ tylenol, dilaudid held d/t RR and lethargy. 2 groin sites, L groin site mildly swollen. Alvarez present w/ adequate UOP. Pt having sips of clears, tolerating.

## 2022-12-29 NOTE — PLAN OF CARE
Goal Outcome Evaluation: POD1 Repair L femoral pseudoaneurysm with R groin angiogram. A&O4. VSS on RA. Incision to R & L groin WNL. Mild swelling noted to L groin, ice applied. Pain managed with PRN Dilaudid x3 with relief. Baseline numbness and tingling to BLE. DP pulses palpable. PIV SL. Pt drinking well during the shift. Tolerating regular diet. Denies any nausea/;vomiting. Alvarez in place with AUO. UP with A1GB Walker, ambulating in room. Continue to monitor.

## 2022-12-29 NOTE — DISCHARGE SUMMARY
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 Lozano's operative report dated 12/28/2022:  \"A 62-year-old patient had undergone an aortobifemoral bypass graft with a left femoral to above knee popliteal in situ bypass graft on 03/17/2010.  More recently, she has undergone a right femoral to above knee popliteal cadaveric SFA bypass graft followed by a cephalic vein jump graft from the distal above the SFA graft to the distal popliteal artery.  Duplex ultrasound has revealed 2 moderately worsening stenosis within the cephalic vein bypass graft but also a very critical stenosis within the anterior tibial artery outflow, which is her only runoff.  We felt that this indicated an intraoperative angiogram and possible angioplasty or revision.  During a recent evaluation, we found a pseudoaneurysm of the left common femoral artery not previously appreciated measuring 3.4 x 2.9 x 2.3 cm.  The distal common femoral to above knee popliteal bypass graft is widely patent.  It is felt that repair of the pseudoaneurysm was also indicated.\"           Hospital Course:   The patient underwent primary repair of her left femoral pseudoaneurysm and " angioplasty of her right femorotibioperoneal trunk bypass and distal anastomosis on 12/28/22. Following the angioplasty there was an embolic occlusion of the mid-anterior tibial artery on the right which was successfully removed with suction thrombectomy. She tolerated the procedure well and was admitted for routine postoperative care. The remainder of her postoperative course was unremarkable. On the day of discharge, she was tolerating regular diet, her pain was well controlled with oral pain medications, she was voiding spontaneously, and ambulating independently. Patient with follow up in vascular surgery clinic.         Imaging Studies:     Results for orders placed or performed during the hospital encounter of 12/28/22   IR OR Angiogram    Narrative    INTERVENTIONAL RADIOLOGY OR ANGIOGRAM  12/28/2022 4:05 PM     HISTORY:  62-year-old female status post a right leg cadaveric  SFA/cephalic vein femoral to above knee popliteal artery bypass graft  that failed requiring a jump cadaveric SFA bypass graft with distal  anastomosis to the tibioperoneal trunk. Patient was noted to have  significant stenosis at the distal aspects of the jump graft and at  the distal anastomosis.    COMPARISON: Ultrasound dated 12/22/2022.    DESCRIPTION OF PROCEDURE: Dr. Lozano from vascular surgery performed  right proximal femoral cutdown. Arterial access was obtained. A 6  German vascular sheath was placed. An angiogram was performed.    FINDINGS: There were two significant stenoses in the distal aspects of  the jump graft. There was a significant stenosis at the distal  anastomosis extending into the proximal anterior tibial artery. The  dominant runoff artery was the anterior tibial artery.    Intervention: The stenoses in the distal aspects of the jump graft  were dilated with a 5 followed by a 6 mm balloon. Follow-up angiogram  showed improvement in luminal diameter. The stenosis at the distal  anastomosis extending into the  anterior tibial artery was dilated with  a 4 mm balloon.    Follow-up angiogram showed abrupt occlusion of the proximal anterior  tibial artery. Initially, this was felt to be due to vasospasm.  Vasodilators were administered intra-arterially. Follow-up angiogram  showed improvement in filling of the angioplastied portion of the  distal anastomosis and proximal anterior tibial artery however  persistent occlusion within the proximal/mid anterior tibial artery. A  3 mm balloon was inflated across this occlusion and held up for 2  minutes. Follow-up angiogram showed improved flow within the proximal  anterior tibial artery but a 3 cm persistent occlusion within the  proximal/mid anterior tibial artery. Appearance suggested that this  was likely due to an embolus.    A 4 [place] Penumbra aspiration catheter was then passed to the level  of the occlusion and suction aspiration was performed. A small embolus  was able to be aspirated out. Follow-up angiogram showed improvement  in flow across the anterior tibial artery. There was persistent focal  vasospasm in the proximal/mid anterior tibial artery. At this time,  the results were accepted.    Dr. Lozano removed the sheath and performed surgical closure of the  access to artery and superimposed tissues. Please refer to the  operative note for further specifics.    Fluoroscopy time: 10.9 minutes    Total fluoroscopy dose: 21.3 mGy    Contrast: 65 cc Isovue      Impression    IMPRESSION: Angiography performed as above. Stenoses in the distal  aspects of a right leg jump graft angioplastied with improvement in  luminal diameter. Angioplasty of the distal anastomosis of the jump  graft resulted in improvement in luminal diameter but embolic  occlusion of the proximal/mid anterior tibial artery. The embolus was  cleared with suction aspiration. Final angiogram showed satisfactory  flow in the right anterior tibial artery with a focal segment of  vasospasm in the proximal/mid  anterior tibial artery.      *Note: Due to a large number of results and/or encounters for the requested time period, some results have not been displayed. A complete set of results can be found in Results Review.              Medications Prior to Admission:     Medications Prior to Admission   Medication Sig Dispense Refill Last Dose     amLODIPine (NORVASC) 5 MG tablet Take 1 tablet (5 mg) by mouth 2 times daily 180 tablet 3 MORE THAN A WEEK at UNKNOWN     aspirin (ASA) 81 MG chewable tablet Take 1 tablet (81 mg) by mouth daily 30 tablet 3 12/23/2022 at AM     BREO ELLIPTA 200-25 MCG/ACT inhaler Inhale 1 puff into the lungs daily 120 each 11 12/28/2022 at AM     Calcium Carb-Cholecalciferol (CALCIUM CARBONATE-VITAMIN D3) 600-400 MG-UNIT TABS TAKE ONE TABLET BY MOUTH TWICE DAILY 180 tablet 3 12/28/2022 at AM     carvedilol (COREG) 6.25 MG tablet TAKE ONE TABLET BY MOUTH TWICE A DAY WITH MEALS 180 tablet 3 12/28/2022 at AM     clopidogrel (PLAVIX) 75 MG tablet Take 1 tablet (75 mg) by mouth daily 90 tablet 3 12/23/2022 at AM     denosumab (PROLIA) 60 MG/ML SOLN injection Inject 1 mL (60 mg) Subcutaneous every 6 months INDICATION: TO TREAT OSTEOPOROSIS 1 Syringe 0 Unknown at Unknown     diphenhydrAMINE (BENADRYL) 25 MG tablet take Benadryl 25-50 mg one hour prior to the procedure 2 tablet 0 12/28/2022 at AM     gabapentin (NEURONTIN) 100 MG capsule Take 1 capsule (100 mg) by mouth 3 times daily 90 capsule 3 12/28/2022 at AM     lisinopril (ZESTRIL) 20 MG tablet Take 1 tablet (20 mg) by mouth 2 times daily. 180 tablet 2 12/28/2022 at AM     methylPREDNISolone (MEDROL) 16 MG tablet Take two tablets (32mg) Medrol by mouth 12 hours before procedure and repeat two tablets (32mg) by mouth 2 hours before procedure to prevent contrast reaction. If you have had an allergic reaction (hives) after being pre-treated with Medrol, you will also need to take Benadryl 25-50 mg one hour prior to the procedure. 4 tablet 0 12/28/2022  at AM     nitroGLYcerin (NITROSTAT) 0.4 MG sublingual tablet Place 1 tablet (0.4 mg) under the tongue See Admin Instructions for chest pain 30 tablet 11 Unknown at PRN     omeprazole (PRILOSEC) 20 MG DR capsule TAKE ONE CAPSULE BY MOUTH DAILY 90 capsule 3 12/28/2022 at AM     rosuvastatin (CRESTOR) 40 MG tablet Take 1 tablet (40 mg) by mouth At Bedtime 90 tablet 1 12/27/2022 at PM     [DISCONTINUED] acetaminophen (TYLENOL) 500 MG tablet Take 500-1,000 mg by mouth every 6 hours as needed for mild pain    at PRN              Discharge Medications:     Current Discharge Medication List      CONTINUE these medications which have CHANGED    Details   acetaminophen (TYLENOL) 325 MG tablet Take 3 tablets (975 mg) by mouth every 8 hours for 7 days  Qty: 63 tablet, Refills: 0    Associated Diagnoses: Critical ischemia of extremity with history of revascularization of same extremity (H)         CONTINUE these medications which have NOT CHANGED    Details   amLODIPine (NORVASC) 5 MG tablet Take 1 tablet (5 mg) by mouth 2 times daily  Qty: 180 tablet, Refills: 3    Associated Diagnoses: Essential hypertension      aspirin (ASA) 81 MG chewable tablet Take 1 tablet (81 mg) by mouth daily  Qty: 30 tablet, Refills: 3    Associated Diagnoses: PAD (peripheral artery disease) (H)      BREO ELLIPTA 200-25 MCG/ACT inhaler Inhale 1 puff into the lungs daily  Qty: 120 each, Refills: 11    Associated Diagnoses: Chronic obstructive pulmonary disease, unspecified COPD type (H)      Calcium Carb-Cholecalciferol (CALCIUM CARBONATE-VITAMIN D3) 600-400 MG-UNIT TABS TAKE ONE TABLET BY MOUTH TWICE DAILY  Qty: 180 tablet, Refills: 3    Associated Diagnoses: Osteoporosis, unspecified osteoporosis type, unspecified pathological fracture presence      carvedilol (COREG) 6.25 MG tablet TAKE ONE TABLET BY MOUTH TWICE A DAY WITH MEALS  Qty: 180 tablet, Refills: 3    Associated Diagnoses: Essential hypertension      clopidogrel (PLAVIX) 75 MG tablet Take  1 tablet (75 mg) by mouth daily  Qty: 90 tablet, Refills: 3    Associated Diagnoses: Peripheral vascular disease (H)      denosumab (PROLIA) 60 MG/ML SOLN injection Inject 1 mL (60 mg) Subcutaneous every 6 months INDICATION: TO TREAT OSTEOPOROSIS  Qty: 1 Syringe, Refills: 0    Associated Diagnoses: Osteoporosis without current pathological fracture, unspecified osteoporosis type      diphenhydrAMINE (BENADRYL) 25 MG tablet take Benadryl 25-50 mg one hour prior to the procedure  Qty: 2 tablet, Refills: 0    Associated Diagnoses: Contrast media allergy      gabapentin (NEURONTIN) 100 MG capsule Take 1 capsule (100 mg) by mouth 3 times daily  Qty: 90 capsule, Refills: 3    Associated Diagnoses: Critical lower limb ischemia (H)      lisinopril (ZESTRIL) 20 MG tablet Take 1 tablet (20 mg) by mouth 2 times daily.  Qty: 180 tablet, Refills: 2    Associated Diagnoses: Essential hypertension      methylPREDNISolone (MEDROL) 16 MG tablet Take two tablets (32mg) Medrol by mouth 12 hours before procedure and repeat two tablets (32mg) by mouth 2 hours before procedure to prevent contrast reaction. If you have had an allergic reaction (hives) after being pre-treated with Medrol, you will also need to take Benadryl 25-50 mg one hour prior to the procedure.  Qty: 4 tablet, Refills: 0    Associated Diagnoses: Contrast media allergy      nitroGLYcerin (NITROSTAT) 0.4 MG sublingual tablet Place 1 tablet (0.4 mg) under the tongue See Admin Instructions for chest pain  Qty: 30 tablet, Refills: 11    Associated Diagnoses: Coronary artery disease involving native coronary artery of native heart without angina pectoris      omeprazole (PRILOSEC) 20 MG DR capsule TAKE ONE CAPSULE BY MOUTH DAILY  Qty: 90 capsule, Refills: 3    Comments: Prior intolerance of pantoprazole.  No issues with omeprazole.  Fill prescription as prescribed  Associated Diagnoses: Gastroesophageal reflux disease with esophagitis without hemorrhage      rosuvastatin  (CRESTOR) 40 MG tablet Take 1 tablet (40 mg) by mouth At Bedtime  Qty: 90 tablet, Refills: 1    Associated Diagnoses: Hyperlipidemia LDL goal <70                  Day of Discharge Physical Exam:   Temp:  [96.4  F (35.8  C)-98.7  F (37.1  C)] 98.4  F (36.9  C)  Pulse:  [45-70] 53  Resp:  [10-16] 16  BP: (105-193)/(48-85) 128/59  MAP:  [67 mmHg-149 mmHg] 67 mmHg  Arterial Line BP: (120-193)/() 120/45  SpO2:  [91 %-100 %] 96 %    Awake, alert, appropriate  Nonlabored breathing on room air    Bilateral groin incisions clean, dry, intact, small amount of surround ecchymosis as expected  Small area of ecchymosis and swelling in the right forearm, contained within the marked borders.  Palpable right ulnar pulse with multiphasic Doppler signal.  Monophasic right radial signal.  Normal and symmetric strength and sensation to light touch in the bilateral upper and lower extremities.  Palpable bilateral bypass grafts in the bilateral medial thighs.  2+ right dorsalis pedis pulse  1+ left dorsalis pedis pulse with biphasic doppler signal  Both feet warm, normal color           Discharge Instructions and Follow-Up:     Discharge Procedure Orders   Reason for your hospital stay   Order Comments: Surgery to fix the left femoral artery pseudoaneurysm and angioplasty of left leg bypass graft     Follow-up and recommended labs and tests    Order Comments: Follow-up in vascular surgery clinic in two weeks for wound check. No lab tests or imaging studies are needed before this appointment.    The vascular surgery clinic coordinator will call you within 3 business days after leaving the hospital to schedule your appointment.      With general vascular surgery questions, concerns, or to request/change an appointment, please call the [facility] Vascular Surgery Clinic:    [address]  [address]  [place] [id number]    Phone: [phone]     Activity   Order Comments: Your activity upon discharge: No lifting more than  10 lbs for 4 weeks, otherwise activity as tolerated.     Order Specific Question Answer Comments   Is discharge order? Yes      Wound care and dressings   Order Comments: Instructions to care for your wound at home:  Groin incisions closed with absorbable suture and skin glue. Leave theses open to air. Okay to shower and pat dry. Do not submerge your wounds in any body of water for one month. No tub baths, hot tubs, lakes, swimming pools.     When to contact your care team   Order Comments: If your travel plans upon discharge include prolonged periods of sitting (a lengthy car or plane ride), it is highly beneficial to get up and walk at least once per hour to help prevent swelling and blood clots.     You may shower after operation, let warm soapy water run over incision and pat dry. Do not submerge, soak, or scrub incision or swim until seen in follow-up.     Do not use any creams/oils/lotions on incisions.      Take incentive spirometer home for continued frequent use    Stay hydrated.    Narcotic pain medications can cause constipation.  Take stool softener while taking narcotic pain medication. If no bowel movement for 2 days add a laxative to aid in bowel movement. You can obtain a laxative from your pharmacyst over the counter.  Over the counter fiber (metamucil or benefiber) and stool softeners (Miralax, docusate or senna) are encouraged if becoming constipated.     CALL for fever greater than 101.5, chills, red skin around incision, drainage from incision, increased swelling from the incision, bleeding from the incision that is not controlled with light pressure, new nasuea/vomiting or other new/worsening symptoms.     Do not drive until you can withstand pressing the break peddle quickly and fully without pain and you are not distracted by pain. Do not operate a motor vehicle while taking narcotics. No driving, no using heavy machinery and no major decision-making while taking narcotic pain medication. It  is illegal to drive while taking narcotic pain medication.    Transition to ibuprofen or tylenol/acetaminophen for pain control. Do not take tylenol/acetaminophen and acetaminophen containing narcotic (e.g., percocet or vicodin) at the same time. Do not take more than 4000mg of tylenol/acetaminophen in 24 hours. If you have known ulcer problems, or kidney trouble (elevated creatinine) do not take the ibuprofen.    In emergencies, call 911. For non-emergent questions call the Vascular [facility] at [phone].     Diet   Order Comments: Follow this diet upon discharge: Orders Placed This Encounter      Regular Diet Adult     Order Specific Question Answer Comments   Is discharge order? Yes              Discharge Disposition:     Discharged to home      Condition at discharge: Stable    Patient was discussed with staff, Dr. Lozano, on the day of discharge.    Yamil Villagomez MD      STAFF: As above.  Decreased blood flow to left DP following procedure raising the possibility of small embolus.  However good Doppler signals and normal CMS.  We will follow clinically.  Excellent palpable right DP pulse following angioplasty procedures.  Chronic aspirin/Plavix.  Follow-up in clinic in 2 weeks.      Chadwick Lozano MD

## 2022-12-30 VITALS
SYSTOLIC BLOOD PRESSURE: 129 MMHG | OXYGEN SATURATION: 98 % | RESPIRATION RATE: 16 BRPM | HEART RATE: 48 BPM | DIASTOLIC BLOOD PRESSURE: 59 MMHG | WEIGHT: 112.7 LBS | BODY MASS INDEX: 20.74 KG/M2 | TEMPERATURE: 98.3 F | HEIGHT: 62 IN

## 2022-12-30 LAB — GLUCOSE BLDC GLUCOMTR-MCNC: 106 MG/DL (ref 70–99)

## 2022-12-30 PROCEDURE — 250N000013 HC RX MED GY IP 250 OP 250 PS 637: Performed by: STUDENT IN AN ORGANIZED HEALTH CARE EDUCATION/TRAINING PROGRAM

## 2022-12-30 RX ORDER — OXYCODONE HYDROCHLORIDE 5 MG/1
5 TABLET ORAL EVERY 4 HOURS PRN
Qty: 10 TABLET | Refills: 0 | Status: SHIPPED | OUTPATIENT
Start: 2022-12-30 | End: 2023-03-02

## 2022-12-30 RX ORDER — LISINOPRIL 20 MG/1
20 TABLET ORAL 2 TIMES DAILY
Status: DISCONTINUED | OUTPATIENT
Start: 2022-12-30 | End: 2022-12-30

## 2022-12-30 RX ADMIN — POLYETHYLENE GLYCOL 3350 17 G: 17 POWDER, FOR SOLUTION ORAL at 08:15

## 2022-12-30 RX ADMIN — GABAPENTIN 100 MG: 100 CAPSULE ORAL at 08:15

## 2022-12-30 RX ADMIN — ACETAMINOPHEN 975 MG: 325 TABLET, FILM COATED ORAL at 02:33

## 2022-12-30 RX ADMIN — AMLODIPINE BESYLATE 5 MG: 5 TABLET ORAL at 08:15

## 2022-12-30 RX ADMIN — SENNOSIDES AND DOCUSATE SODIUM 1 TABLET: 50; 8.6 TABLET ORAL at 08:16

## 2022-12-30 RX ADMIN — OXYCODONE HYDROCHLORIDE 5 MG: 5 TABLET ORAL at 08:25

## 2022-12-30 RX ADMIN — NICOTINE 1 PATCH: 7 PATCH, EXTENDED RELEASE TRANSDERMAL at 08:17

## 2022-12-30 RX ADMIN — LISINOPRIL 20 MG: 20 TABLET ORAL at 08:16

## 2022-12-30 RX ADMIN — CLOPIDOGREL BISULFATE 75 MG: 75 TABLET ORAL at 08:15

## 2022-12-30 RX ADMIN — ASPIRIN 81 MG CHEWABLE TABLET 81 MG: 81 TABLET CHEWABLE at 08:16

## 2022-12-30 ASSESSMENT — ACTIVITIES OF DAILY LIVING (ADL)
ADLS_ACUITY_SCORE: 22

## 2022-12-30 NOTE — PROVIDER NOTIFICATION
MD Notification    Notified Person: MD    Notified Person Name: Yamil Villagomez    Notification Date/Time: 12/29/2022 2049    Notification Interaction: FYI page.      Purpose of Notification: low sodium 130 & Hematoma by arterial access site on L forearm.     Orders Received: Continue to monitor for increased numbness, tingling or weakness.     Comments: hematoma has been outlined.

## 2022-12-30 NOTE — PROGRESS NOTES
Vascular Surgery Progress Note:  POD#2    S: Very comfortable.  Occasional use of oxycodone.  Ready for discharge home today.    O:   Vitals:  BP  Min: 105/48  Max: 160/66  Temp  Av.4  F (36.9  C)  Min: 98  F (36.7  C)  Max: 99.1  F (37.3  C)  Pulse  Av.3  Min: 51  Max: 57  I/O last 3 completed shifts:  In: 1280 [P.O.:1280]  Out: 1425 [Urine:1425]    Physical Exam: Alert and appropriate.  Quite comfortable.   Small right wrist hematoma from arterial line   Right proximal thigh and left groin wounds=A   +3 palpable right and left graft pulses   +3 right DP pulse with excellent Doppler   Left DP difficult to palpate but strong mono to   biphasic Doppler    Hgb= 10.1      Assessment/Plan: #1.  Excellent result from angioplasty of right distal cephalic vein graft and anterior tibial with palpable pulse and good Doppler.  We will follow-up with duplex in 6 weeks.     #2.  Successful repair of left femoral pseudoaneurysm.  Possible of distal emboli to anterior tibial artery since had strong palpable DP pulse preoperatively but still has very good Doppler.  We will continue to monitor since patient is completely asymptomatic.     #3.  Mild acute blood loss anemia.  Recommend iron supplement and vitamins.     #4.  Still smoking cigars.  Knows she needs to quit.    Call today and chronic aspirin/Plavix.  Follow-up in clinic in 2 weeks.      Wm. Balke MD

## 2022-12-30 NOTE — PLAN OF CARE
Goal Outcome Evaluation: POD2 Repair of L femoral pseudoaneurysm with R groin angiogram. A&O4. VSS on RA. Incision to R & L groin WNL. Mild swelling noted to L groin, ice continued. Pain managed with PRN oxy x1 with relief. Baseline numbness and tingling to BLE. DP pulses palpable, PT pulses doppler. Hematoma noted on left forearm at arterial access site, MD updated. Pt denies any onset of weakness or increased numbness or tingling in that arm.  Tolerating regular diet. Denies any nausea/vomiting. up with SBA walker to bathroom,  AUO. Discharge pending.

## 2022-12-30 NOTE — PROGRESS NOTES
"Vascular surgery progress note    Shirley is doing well this morning.  Slept well.  Small hematoma near her right antecubital fossa at the site of her previous arterial line.  This is stable in size and she is asymptomatic, without hand or arm tingling, numbness, or paresthesias.  Ambulating without difficulty.  Pain controlled.  Tolerating regular diet.    /41 (BP Location: Left arm)   Pulse 57   Temp 98.4  F (36.9  C) (Oral)   Resp 17   Ht 1.575 m (5' 2\")   Wt 51.1 kg (112 lb 11.2 oz)   LMP  (LMP Unknown)   SpO2 94%   BMI 20.61 kg/m      Resting in bed  Nonlabored breathing on room air  Small area of ecchymosis and swelling in the right forearm, contained within the marked borders.  Palpable right ulnar pulse with multiphasic Doppler signal.  Monophasic right radial signal.  Normal and symmetric strength and sensation to light touch in the bilateral upper and lower extremities.  Groin incisions are healing well, small, stable amount of swelling over the left incision.  Palpable graft pulses in the medial thighs bilaterally  Palpable right dorsalis pedis pulse  Faintly palpable left dorsalis pedis pulse with good Doppler signal    Assessment/Plan: 62-year-old female postop day 2 status post left common femoral pseudoaneurysm repair and right femoral artery exposure for balloon angioplasty of her right femoral to tibioperoneal trunk bypass graft.  Recovering well.  Plan to discharge home today.    Yamil Villagomez MD    "

## 2022-12-30 NOTE — PROGRESS NOTES
Patient discharged from gen surg unit with NA via wc.  Orientation:x4  Respirations status: WNL RA  Ambulation status: SBA  Pain status: Controlled  VS: WNL, Bradycardic  PIV: Removed and dressed  Dressings: CDI, RIN  Discharge outcome: After visit summary provided and explained, patient verbalized understanding. Left the floor with all of her belongings and medications.

## 2023-01-02 ENCOUNTER — PATIENT OUTREACH (OUTPATIENT)
Dept: INTERNAL MEDICINE | Facility: CLINIC | Age: 63
End: 2023-01-02

## 2023-01-02 NOTE — TELEPHONE ENCOUNTER
"Pt stated she doesn't want to follow up with dr bruno unless \"things go wrong\". Pt was going to check with surgeon to see if they recommend her following up with PCP and call us back if they do recommend it.     Does PCP want pt to follow up with him? Routing to PCP to review and advise.   ED for acute condition Discharge Protocol    \"Hi, my name is Mikaela Vallecillo RN, a registered nurse, and I am calling from [facility].  I am calling to follow up and see how things are going for you after your recent emergency visit.\"    Tell me how you are doing now that you are home?\" \"Surgery happened a little quicker than expected\"   \"they stitched me in my groin area, they put superglue on it and used stiches\".   Advised pt to call surgeon about questions regarding incision and follow up.    \"Sill not driving or doing steps.   Pt states \"I put pressure on it when I cough, otherwise I dont cough\".- Advised pt to inform surgeon about this.     Discharge Instructions    \"Let's review your discharge instructions.  What is/are the follow-up recommendations?  Pt. Response: \"I know I have to follow up with dr. cox in a could weeks.\"     \"Has an appointment with your primary care provider been scheduled?\"  No. Pt doesnt think she needs to follow up with PCP but will check with surgeon if she needs to follow up with PCP    Medications    \"Tell me what changed about your medicines when you discharged?\"    \"just gave me some extra strength tylenol for pain medication. Otherwise everything is the same.   \"I am not taking the oxys he sent home. I took them the first 2 days but other wise I havent\"   \"What questions do you have about your medications?\" None            Call Summary    \"What questions or concerns do you have about your recent visit and your follow-up care?\"     none    \"If you have questions or things don't continue to improve, we encourage you contact us through the main clinic number (give number).  Even " "if the clinic is not open, triage nurses are available 24/7 to help you.     We would like you to know that our clinic has extended hours (provide information).  We also have urgent care (provide details on closest location and hours/contact info)\"    \"Thank you for your time and take care!\"              "

## 2023-01-02 NOTE — TELEPHONE ENCOUNTER
What type of discharge? Inpatient  Risk of Hospital admission or ED visit: 8%  Is a TCM episode required? No  When should the patient follow up with PCP? 14 days of discharge.    Vikki Ramirez RN  [facility]

## 2023-01-10 NOTE — PROGRESS NOTES
[facility]    Shirley Hendricks returns for vascular follow-up.  He has undergone multiple vascular procedures including aortobifemoral bypass graft, left femoral to popliteal in situ graft, carotid enterectomy, right femoral to AK popliteal cadaveric SFA graft with distal revision to BK pop with AT runoff using cephalic vein.    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  Was also worsening stenosis of the distal right cephalic vein to the below-knee popliteal artery.    12/28/2022 repair of left common femoral artery pseudoaneurysm.  Excellent inflow and outflow via bypass graft.  No obvious flow within the profundus femoral artery.  Intraoperative angiogram of the right graft was performed with angioplasty of 2 stenoses within the cephalic vein graft and also of the outflow inflow of the outflow into the AT article of the outflow into the AT artery.  We did have some spasm a small smount of thrombus treated with a Penumbra Device.  Good Doppler Signals postoperative but DP pulse difficult to palpate.    Hgb=10.1 post-op    ROS: She has noted some swelling in the left groin site where there is been multiple surgeries.  The surgical adhesive is still in place.  Very mild swelling in the right groin where we did a cutdown to access the cadaveric SFA graft.   She is noted that her left foot is somewhat cooler than the right but no pain or other issues.    Exam: Alert and appropriate.  Here with her daughter.   Blood pressure 153/69.  Pulse 59.   Mild swelling right proximal thigh incision but otherwise fine.    +3 palpable right graft pulse and DP   Moderate swelling with no evidence of infection left groin incision    +3 left graft pulse.    +1/+2 left DP pulse    Strong Doppler signals in both grafts in the right DP.  Biphasic signal in the left PT and monophasic AT.              IMPRESSION: #1.  Good  initial results following angioplasty of cephalic vein segment of right femoral to BK popliteal/AT bypass.  Good outflow via the anterior tibial artery which is markedly improved from preprocedure.  Follow-up duplex in 6 weeks.  On aspirin/Plavix.     #2.  Repair left femoral artery pseudoaneurysm.  Profundus femoral arteries appear to be occluded.  Possible small embolus occurred as we were cleaning out the pseudoaneurysm/common femoral artery and discussed with patient and daughter.  Still very adequate flow.  There is some swelling postoperatively likely a seroma or small hematoma.  We will observe this clinically and use heat.  Follow-up duplex 6 weeks.      Chadwick Lozano MD   This note was created using Dragon voice recognition software which may result in transcription errors.     numerical 0-10

## 2023-01-12 ENCOUNTER — OFFICE VISIT (OUTPATIENT)
Dept: OTHER | Facility: CLINIC | Age: 63
End: 2023-01-12
Attending: SURGERY
Payer: COMMERCIAL

## 2023-01-12 VITALS — SYSTOLIC BLOOD PRESSURE: 153 MMHG | OXYGEN SATURATION: 99 % | DIASTOLIC BLOOD PRESSURE: 69 MMHG | HEART RATE: 59 BPM

## 2023-01-12 DIAGNOSIS — I73.9 PAD (PERIPHERAL ARTERY DISEASE) (H): Primary | ICD-10-CM

## 2023-01-12 DIAGNOSIS — I47.10 SVT (SUPRAVENTRICULAR TACHYCARDIA) (H): ICD-10-CM

## 2023-01-12 DIAGNOSIS — J44.9 CHRONIC OBSTRUCTIVE PULMONARY DISEASE, UNSPECIFIED COPD TYPE (H): ICD-10-CM

## 2023-01-12 DIAGNOSIS — I72.9 PSEUDOANEURYSM (H): ICD-10-CM

## 2023-01-12 PROCEDURE — G0463 HOSPITAL OUTPT CLINIC VISIT: HCPCS

## 2023-01-12 PROCEDURE — 99024 POSTOP FOLLOW-UP VISIT: CPT | Performed by: SURGERY

## 2023-01-12 NOTE — PROGRESS NOTES
Patient is here to discuss follow up    BP (!) 153/69 (BP Location: Right arm, Patient Position: Chair, Cuff Size: Adult Regular)   Pulse 59   LMP  (LMP Unknown)   SpO2 99%     Questions patient would like addressed today are: N/A.    Refills are needed: No    Has homecare services and agency name:  Isa QUIÑONEZ

## 2023-01-31 ENCOUNTER — TELEPHONE (OUTPATIENT)
Dept: OTHER | Facility: CLINIC | Age: 63
End: 2023-01-31

## 2023-01-31 NOTE — TELEPHONE ENCOUNTER
[facility]    Who is the name of the provider?:  Blake    What is the location you see this provider at/preferred location?: April  Person calling: Shirley Hendricks  Phone number:  [phone]  Nurse call back needed:  YES     Reason for call:   Patient needing a call back on post op wound care instructions.    Pharmacy location:  n/a  Outside Imaging: Not Applicable   Can we leave a detailed message on this number?  YES

## 2023-01-31 NOTE — TELEPHONE ENCOUNTER
"Pt is s/p left femoral pseudoaneurysm repair on 12/28/22.    Pt reports a scab at the left groin incision site, \"red and puffy\".  Denies fever/nausea/vomiting/chills. Picture received below.  Discussed with ANNE Page.  Pt to cover with gauze, to be seen on Thursday.  Pt scheduled accordingly and verbalized understanding.         ESTHER PabonN, RN-[facility] [place]      "

## 2023-02-01 NOTE — PROGRESS NOTES
[facility]    Shirley Hendricks returns for vascular follow-up.  This 59-year-old patient has a history of pan systemic vascular issues.    She does have chronic low not disabling claudication symptoms that may be somewhat worse in her right leg due to her known SFA occlusion.  No rest pain or ulcerations.    Had a fall and has had some back issues.  CTs of her thoracic spine was performed on 2/26/2020 that was normal.  Longstanding history with hypertension probably feels somewhat worse since this episode.      This past weekend she had 3 separate episodes where she was awake and noticed numbness and significant weakness to her right arm.  This lasted 5 minutes with complete rapid resolution and no other symptoms.      PMH: Medications: Zestril, carvedilol, Prolia, calcium, Norvasc, Lipitor,                                                  Prilosec, Plavix, aspirin              Vascular: Aortobifemoral bypass graft 3/17/2010 with a left femoral                                       above-knee popliteal in situ    bypass graft                            Right CEA 5/11/2016 with known moderate asymptomatic left                                  stenosis.                            Known chronic occlusion right SFA    Exam: Alert and appropriate.  Talkative.  Normal affect.              Blood pressure 179/72 left arm.  Pulse 54.              Well-healed right carotid incision.  Supple neck.              Chest= clear              Cardiovascular= regular rate              Abdomen= well-healed midline incision and groin incisions.                   +3 palpable femoral pulses bilaterally left graft pulse.                    No ischemic symptoms to her feet.  Normal sensation.          6/3/2020 XAVIER= 0.44 on the right and 0.93 in the left.  With exercise right decreases 0.30 in the left  stable at 0.92                    Duplex of the left femoral below-knee popliteal bypass graft reveals it is widely  patent.                    Carotid duplex reveals some stable mild/moderate narrowing of the distal common carotid artery calculated 61% with the ICA distally being widely patent which is unchanged.  Worsening stenosis left carotid bifurcation with left ICA PSV =358 cm/s end-diastolic 104 cm/s with a ratio of 5.50    Duplex on 8/23/2018 had left ICA PSV= 168 cm/s.  Mild diffuse bifurcation narrowing on the right which had been very stable since surgery in 2016.    Carotid MRA 9/18/2019 with a 60% narrowing of the proximal left internal carotid artery and 50% narrowing of the right.        2/16/2020 laboratory:  SCr= 0.52   Hgb= 13.3                Last LDL=73    Nuclear medicine Lexiscan stress test 2/19/2020: Infarction the mid to distal anterior lateral segment left ventricle with mild to moderate dean-infarct ischemia.  EF= 65%      Impression: #1.  Significant worsening stenosis of left carotid bifurcation.  On her angiogram 9/18/2019 they found a less than 20% stenosis on the left with a 65% symptomatic stenosis on the right.  She now has likely TIAs with the right arm numbness and weakness and thus I feel rapid intervention is indicated and would recommend a left carotid endarterectomy.  We will try to schedule this on 6/8/2020.  This will allow her to get her arrangements at home even though we expect that she will be in the hospital only for 1 day.  Also allow COVID 19 testing.  She will hold her Plavix the day before the surgery.  However, if she has another episode she will call me immediately.                        #2.  New moderate stenosis of right distal common carotid artery status post CEA.  On last duplex on 8/23/2018 there was a very mild change at this location with no significant narrowing which is now felt to be approximately 60%.  This is not yet clinically significant but will need to be watched closely.                        #3.  Patent aortobifemoral bypass graft and left femoral-popliteal  bypass graft.  Known occlusion of right SFA with relatively stable symptoms though perhaps some progressive symptoms with walking longer distances.  No planned intervention at this time.                         #4.  Ongoing issues with hypertension.  Stable cardiac disease.    I spent 45 minutes with the patient today with over 50% counseling.  Plan for left CEA this coming week.       Chadwick Lozano MD     CC:  Dr Vasquez Benoit         Orientation to room/Bed in low position, brakes on/Side rails x 2 or 4 up, assess large gaps, such that a patient could get extremity or other body part entrapped, use additional safety procedures/Assess eliminations need, assist as needed/Call light is within reach, educate patient/family on its functionality/Environment clear of unused equipment, furniture's in place, clear of hazards/Assess for adequate lighting, leave nightlight on/Patient and family education available to parents and patient

## 2023-02-02 ENCOUNTER — OFFICE VISIT (OUTPATIENT)
Dept: OTHER | Facility: CLINIC | Age: 63
End: 2023-02-02
Payer: COMMERCIAL

## 2023-02-02 VITALS — HEART RATE: 59 BPM | OXYGEN SATURATION: 100 % | SYSTOLIC BLOOD PRESSURE: 128 MMHG | DIASTOLIC BLOOD PRESSURE: 76 MMHG

## 2023-02-02 DIAGNOSIS — L98.492 SKIN ULCER OF LEFT GROIN WITH FAT LAYER EXPOSED (H): Primary | ICD-10-CM

## 2023-02-02 DIAGNOSIS — I73.9 PAD (PERIPHERAL ARTERY DISEASE) (H): ICD-10-CM

## 2023-02-02 PROCEDURE — 99024 POSTOP FOLLOW-UP VISIT: CPT | Performed by: SURGERY

## 2023-02-02 PROCEDURE — G0463 HOSPITAL OUTPT CLINIC VISIT: HCPCS

## 2023-02-02 NOTE — PROGRESS NOTES
[place] [facility]    Shirley Hendricks sent a photograph yesterday about her left distal inguinal incision with breakdown and some necrotic tissue.  No evidence of infection, fever, chills or other issues but very concerning due to her underlying vascular procedures      .  On 12/28/2022 we repaired a left common femoral pseudoaneurysm following her aorto bifemoral bypass graft with right primary repair preserving blood flow in her left femoral to popliteal in situ bypass graft.  However, this is at least the third time that this area has medically explored surgically and we are very concerned about wound healing.  We also performed a cutdown over the right cadaveric SFA graft and angioplastied to stenoses distally with good runoff via the anterior tibial artery.    She noticed changes approximate week ago in the left groin with a dehiscence and some necrotic subcutaneous tissue.  No issues with the right proximal thigh cutdown.    Exam: Alert and appropriate.  Normal affect.  Ambulating easily.   Blood pressure 120/76.  Pulse 59   +3 palpable pulses in both bypass grafts.   Left distal groin incision has dehisced with some necrotic subcutaneous tissue.  Mild border erythema.  No purulence.  This measures 2 cm x 1 cm with a depth of 1 cm.          PROCEDURE: Timeout was called the sites were identified the left.  4% topical lidocaine.  Using a #15 blade scalpel full-thickness/subcutaneous excisional debridement is performed removing the nonviable subcutaneous tissue.  This did extend approximate 1 cm but no obvious extension to the vascular structures.  Very adequate bleeding is noted.  Photographs were taken.  Packed with Aquacel Ag.      IMPRESSION: Distal breakdown of left groin incision with no obvious infection.  Necrotic subcutaneous tissue is been excised with good bleeding.  May shower daily and lightly packed the wound with Aquacel Ag.  Follow-up in 2 to 3 weeks.  Call if there is any signs  of infection.  Reassuring there is no Notta collagen is tissue at this site.  The left limb of the Hemashield graft was well incorporated into the surrounding tissue which decreases risk of infecting this.      Chadwick Lozano MD   This note was created using Dragon voice recognition software which may result in transcription errors.

## 2023-02-02 NOTE — PROGRESS NOTES
Patient is here to discuss follow up    /76 (BP Location: Left arm, Patient Position: Chair, Cuff Size: Adult Regular)   Pulse 59   LMP  (LMP Unknown)   SpO2 100%     Questions patient would like addressed today are: N/A.    Refills are needed: No    Has homecare services and agency name:  Isa QUIÑONEZ

## 2023-02-13 NOTE — PROGRESS NOTES
[place] [facility]    Shirley Hendricks comes in for reevaluation of her left groin incision where she had undergone surgery for a pseudoaneurysm of the common femoral artery status post ABF grafting and left femoral to popliteal in situ.  This was repaired primarily (profundus femoral artery was occluded).  She did develop a superficial wound breakdown in this groin and has had several surgeries which was debrided last week and treated with Aquacel AG    Had also undergone an angiogram of the right leg with a cutdown on her cadaveric graft to angioplasty the stenosis which had developed in her cephalic vein jump graft down to the tibial arteries.    History of carotid endarterectomy.      Changing left groin dressings daily with Aquacel Ag.  Minimal drainage.  No signs of sepsis.  Right leg feels fine.    Still smoking occasionally--definitely aware that she needs to quit with vascular issues    Exam: Alert and appropriate.  Normal affect.   Blood pressure 143/65.  Pulse 63.   Left groin ulcer is somewhat better.  Measures 1.5 x 0.8 x 0.4 cm    Mild amount of bioburden.  Still relatively superficial.      No purulence.   +3 palpable right and left leg graft pulses.     Procedure: Timeout was called and sites were identified and left groin.  Using a #15 blade scalpel a full-thickness subcutaneous excisional debridement was performed removing all bioburden down to bleeding healthy tissue.  Still relatively superficial.  Tolerated this very well.  Aquacel Ag applied at the base followed by gauze and Medipore tape.    IMPRESSION: #1.  Slowly improving left groin surgical ulcer.  Will need to heal by secondary intent.  Continue with daily Aquacel Ag.  May shower but should not bathe.  Follow-up exam in 2 weeks.       #2.  Right leg composite graft of cadaveric SFA and outflow of cephalic vein with the latter being angioplastied peers to be functioning well.  Duplex of angioplasty site in 2  laura.      Chadwick Lozano MD   This note was created using Dragon voice recognition software which may result in transcription errors.

## 2023-02-16 ENCOUNTER — OFFICE VISIT (OUTPATIENT)
Dept: OTHER | Facility: CLINIC | Age: 63
End: 2023-02-16
Attending: SURGERY
Payer: COMMERCIAL

## 2023-02-16 VITALS — SYSTOLIC BLOOD PRESSURE: 143 MMHG | HEART RATE: 63 BPM | DIASTOLIC BLOOD PRESSURE: 65 MMHG

## 2023-02-16 DIAGNOSIS — I73.9 PAD (PERIPHERAL ARTERY DISEASE) (H): Primary | ICD-10-CM

## 2023-02-16 DIAGNOSIS — L98.492 SKIN ULCER OF LEFT GROIN WITH FAT LAYER EXPOSED (H): ICD-10-CM

## 2023-02-16 PROCEDURE — G0463 HOSPITAL OUTPT CLINIC VISIT: HCPCS

## 2023-02-16 PROCEDURE — 11042 DBRDMT SUBQ TIS 1ST 20SQCM/<: CPT | Performed by: SURGERY

## 2023-02-16 PROCEDURE — 99024 POSTOP FOLLOW-UP VISIT: CPT | Performed by: SURGERY

## 2023-02-16 NOTE — PROGRESS NOTES
[facility]        Patient is here for a  follow up.     Pt is currently taking Aspirin, Statin and Plavix.    BP (!) 143/65 (BP Location: Left arm, Patient Position: Chair, Cuff Size: Adult Regular)   Pulse 63   LMP  (LMP Unknown)     The provider has been notified that the patient has no concerns.     Questions patient would like addressed today are: N/A.    Refills are needed: N/A    Has homecare services and agency name:  Isa Al MA

## 2023-02-28 NOTE — PROGRESS NOTES
[place] [facility]    Shirley Hendricks has a significant vascular history.  This includes a remote aortobifemoral bypass graft and left femoral to popliteal in situ bypass graft has been functioning well.  She is undergone a right limb of the graft to above-knee popliteal cadaveric SFA bypass.  Developed occlusion of the above-knee popliteal artery that did not respond to angioplasty.  Outflow revision to the below-knee popliteal artery with cephalic vein.      She developed several stenoses within the below-knee cephalic vein segment along with a pseudoaneurysm of the left common femoral artery.  Primary repair of the left common femoral aneurysm was performed (widely patent in situ bypass graft but occluded profundus femoral artery).  Also right femoral cutdown for angioplasty of the cephalic vein stenoses including the distal anastomosis.  Runoff via the anterior tibial artery.    She developed some wound healing problems in the left groin which have been treated with debridement and healing well measuring 1.5 x 0.8 x 0.4 cm on 2/16/2023.  Using Aquacel Ag.  On her last visit she had excellent palpable pulses in the right and left grafts.    PMH: Medications include Zestril, Coreg, Prolia, Norvasc, Crestor, Prilosec, Plavix, aspirin, inhalers   Still occasionally smoking small cigars     She does have chronic neuropathy.  She takes Neurontin 100 mg 3 times daily which has been very effective.  Needs a refill which will be done today.    Legs are felt fine.  Fully ambulatory.  Left groin distal wound almost completely healed       Exam: Alert and appropriate.  Normal affect.  Walks easily.   Blood pressure 158/75 right arm.  Pulse 54 regular.   Chest= clear   Cardiovascular= regular rate   Left groin incision less than 0.5 x 0.5 cm with no depth.    Very healthy granulation tissue.  No need for debridement     +3 right and left graft pulses.   Bedside Doppler with +3 biphasic signals in both  grafts and  Right distal AT.  Monophasic left PT/AT          Right graft duplex today reveals a widely patent initial graft from the common femoral to above knee popliteal artery with retrograde flow and normal velocities.  The cephalic vein jump graft is also patent.  The graft has a diameter of 2.3 mm which is improved.  Adequate blood flow is noted.  Anterior tibial artery is patent and only runoff vessel by interoperative angiogram.        Impression: #1.  Patent right leg bypass graft.  Mild stenosis within cephalic vein jump graft but very adequate flow.  Continue on aspirin/Plavix.  Follow-up graft duplex in 2 months.     #2.  Patent left leg bypass graft.  Perhaps worsening tibial disease but still very adequate blood supply.  We will reevaluate left common femoral artery pseudoaneurysm repair in 2 months with duplex also.     #3.  Left groin incision almost healed.  We will use bacitracin ointment and Band-Aid.  May shower/bathe.     #4.  We will refill her chronic gabapentin.    25 minutes with patient today including chart review and planning.    Chadwick Lozano MD   This note was created using Dragon voice recognition software which may result in transcription errors.

## 2023-03-02 ENCOUNTER — HOSPITAL ENCOUNTER (OUTPATIENT)
Dept: ULTRASOUND IMAGING | Facility: CLINIC | Age: 63
Discharge: HOME | End: 2023-03-02
Attending: SURGERY
Payer: COMMERCIAL

## 2023-03-02 ENCOUNTER — OFFICE VISIT (OUTPATIENT)
Dept: OTHER | Facility: CLINIC | Age: 63
End: 2023-03-02
Attending: SURGERY
Payer: COMMERCIAL

## 2023-03-02 VITALS — HEART RATE: 54 BPM | DIASTOLIC BLOOD PRESSURE: 75 MMHG | OXYGEN SATURATION: 99 % | SYSTOLIC BLOOD PRESSURE: 158 MMHG

## 2023-03-02 DIAGNOSIS — I65.23 ASYMPTOMATIC BILATERAL CAROTID ARTERY STENOSIS: ICD-10-CM

## 2023-03-02 DIAGNOSIS — I70.229 CRITICAL LOWER LIMB ISCHEMIA (H): ICD-10-CM

## 2023-03-02 DIAGNOSIS — I73.9 PAD (PERIPHERAL ARTERY DISEASE) (H): ICD-10-CM

## 2023-03-02 DIAGNOSIS — E78.5 HYPERLIPIDEMIA LDL GOAL <70: ICD-10-CM

## 2023-03-02 DIAGNOSIS — I73.9 PAD (PERIPHERAL ARTERY DISEASE) (H): Primary | ICD-10-CM

## 2023-03-02 PROCEDURE — 93926 LOWER EXTREMITY STUDY: CPT | Mod: RT

## 2023-03-02 PROCEDURE — G0463 HOSPITAL OUTPT CLINIC VISIT: HCPCS

## 2023-03-02 PROCEDURE — 99024 POSTOP FOLLOW-UP VISIT: CPT | Performed by: SURGERY

## 2023-03-02 PROCEDURE — 93923 UPR/LXTR ART STDY 3+ LVLS: CPT

## 2023-03-02 RX ORDER — GABAPENTIN 100 MG/1
100 CAPSULE ORAL 3 TIMES DAILY
Qty: 90 CAPSULE | Refills: 3 | Status: SHIPPED | OUTPATIENT
Start: 2023-03-02 | End: 2023-06-29

## 2023-03-02 NOTE — PROGRESS NOTES
Patient is here to discuss follow up    BP (!) 158/75 (BP Location: Right arm, Patient Position: Chair, Cuff Size: Adult Regular)   Pulse 54   LMP  (LMP Unknown)   SpO2 99%     Questions patient would like addressed today are: N/A.    Refills are needed: No    Has homecare services and agency name:  Isa QUIÑONEZ

## 2023-03-27 ENCOUNTER — ANCILLARY PROCEDURE (OUTPATIENT)
Dept: ULTRASOUND IMAGING | Facility: CLINIC | Age: 63
End: 2023-03-27
Attending: INTERNAL MEDICINE
Payer: COMMERCIAL

## 2023-03-27 ENCOUNTER — TELEPHONE (OUTPATIENT)
Dept: INTERNAL MEDICINE | Facility: CLINIC | Age: 63
End: 2023-03-27

## 2023-03-27 DIAGNOSIS — E04.1 THYROID NODULE: ICD-10-CM

## 2023-03-27 DIAGNOSIS — R59.9 ENLARGED LYMPH NODE: ICD-10-CM

## 2023-03-27 PROCEDURE — 76536 US EXAM OF HEAD AND NECK: CPT

## 2023-03-27 NOTE — TELEPHONE ENCOUNTER
Wanting to know if pt needs to schedule a follow up with Dr. Benoit what are next steps? Please advise

## 2023-03-28 NOTE — TELEPHONE ENCOUNTER
Spoke with patient.  Thyroid ultrasound shows benign thyroid cysts but mild interval growth of bilateral 1B lymph nodes requiring tissue sampling.  Told patient I will speak with IR tomorrow to see if core needle bx by  CT-guidance an option and if so, if need to hold ASA, :Plavix and to confirm no contrast needed since has contrast allergy that would need treatment before if needed and told pt will call her again tomorrow night

## 2023-04-02 ENCOUNTER — HEALTH MAINTENANCE LETTER (OUTPATIENT)
Age: 63
End: 2023-04-02

## 2023-04-25 ENCOUNTER — MYC MEDICAL ADVICE (OUTPATIENT)
Dept: INTERNAL MEDICINE | Facility: CLINIC | Age: 63
End: 2023-04-25

## 2023-04-28 DIAGNOSIS — I10 ESSENTIAL HYPERTENSION: ICD-10-CM

## 2023-04-28 RX ORDER — CARVEDILOL 6.25 MG/1
TABLET ORAL
Qty: 180 TABLET | Refills: 0 | Status: SHIPPED | OUTPATIENT
Start: 2023-04-28 | End: 2023-05-07

## 2023-05-02 ENCOUNTER — VIRTUAL VISIT (OUTPATIENT)
Dept: INTERNAL MEDICINE | Facility: CLINIC | Age: 63
End: 2023-05-02
Payer: COMMERCIAL

## 2023-05-02 DIAGNOSIS — R59.1 LA (LYMPHADENOPATHY): Primary | ICD-10-CM

## 2023-05-02 DIAGNOSIS — E04.1 CYST OF THYROID: ICD-10-CM

## 2023-05-02 DIAGNOSIS — Z12.31 VISIT FOR SCREENING MAMMOGRAM: ICD-10-CM

## 2023-05-02 DIAGNOSIS — I10 ESSENTIAL HYPERTENSION: ICD-10-CM

## 2023-05-02 DIAGNOSIS — F17.200 TOBACCO USE DISORDER: ICD-10-CM

## 2023-05-02 PROCEDURE — 99214 OFFICE O/P EST MOD 30 MIN: CPT | Mod: VID | Performed by: INTERNAL MEDICINE

## 2023-05-02 NOTE — PROGRESS NOTES
"Shirley is a 62 year old who is being evaluated via a billable video visit.      How would you like to obtain your AVS? [employer]  If the video visit is dropped, the invitation should be resent by: Text to cell phone: [phone]  Will anyone else be joining your video visit? No          ASSESSMENT:    1. LA (lymphadenopathy)  Thyroid ultrasound suggesting some progression.  Discussed possible biopsy symptoms now versus observation.  Patient wishes to observe and recheck future imaging.  We will therefore repeat CT of the neck without contrast given patient's significant contrast allergy in early June to also compare \" apples to apples\" of CT images.  If confirmed progression, will then refer for lymph node biopsy  - CT Soft Tissue Neck w/o Contrast; Future    2. Cyst of thyroid  Known thyroid nodules seen on imaging.  Previous thyroid function normal.  No further evaluation needed    3. Tobacco use disorder  Counseled regarding smoking cessation.  Patient declines at this time    4. Essential hypertension  Blood pressure reportedly controlled at home with today's reading 126/63.  Continue current management    5. Visit for screening mammogram  Candidate for breast cancer screening  - *MA Screening Digital Bilateral; Future      PLAN:  CT soft tissue of the neck without contrast in early June.  Central scheduling will call you or you may call  to schedule CT  Continue current medications  I encourage you to stop all smoking.  If  willing to quit in the future,   contact  [employer] toll-free number (6-087-QUIT-NOW) or through the internet  (quitNetrounds.com) for free nicotine supplementation treatment and/or counseling  Mammogram. This will be done at the [facility]. Call [phone] or use [employer] to schedule.   Will encourage Bivalent covid booster vaccine in future after CT/mammogram  done so as not to bias  lymph node status as vaccination response can affect both CT neck and mammogram " results    Video-Visit Details    Type of service:  Video Visit    Video Start Time: 11:50 AM    Video End Time: 12:13 PM    Originating Location (pt. Location): Home    Distant Location (provider location):  [facility]     Platform used for Video Visit: Debby Benoit MD  Internal Medicine Department  [facility]  Internal Medicine Department      (Chart documentation was completed, in part, with [employer] voice-recognition software. Even though reviewed, some grammatical, spelling, and word errors may remain.)         Vishal Watson is a 62 year old, presenting for the following health issues:  Results       HPI      Hypertension Follow-up      Do you check your blood pressure regularly outside of the clinic? No     Are you following a low salt diet? Yes    Are your blood pressures ever more than 140 on the top number (systolic) OR more   than 90 on the bottom number (diastolic), for example 140/90? Yes    Most recent lab and imaging results reviewed with pt.       EXAM: US THYROID  LOCATION: [facility]  DATE/TIME: 3/27/2023 7:45 AM     INDICATION: Heterogeneous appearance of the thyroid gland likely representing underlying nodules seen on CT neck 12 13 22. Please also comment on any change in size of right  level 1B lymph node measuring 1.5cm  on CT neck12 13 22  COMPARISON: CT neck 12/13/2022  TECHNIQUE: Thyroid ultrasound.      FINDINGS:  RIGHT lobe: 5.2 x 2.2 x 2.0 cm. Slightly heterogeneous with multiple cysts including colloid cysts with internal comet tail artifact. A dominant mid pole cyst measures 1.1 cm and a dominant lower pole cyst also measures 1.1 cm. No solid nodule.  Isthmus: 2 mm.  LEFT lobe: 4.6 x 2.1 x 1.3 cm. [Heterogeneous with scattered subcentimeter cysts. No solid nodule.     NECK: Right level 1B lymph node measuring 1.7 x 1.2 x 1.4 cm, previously 1.5 x 0.8 x 1.3 cm. It has a heterogeneous  thickened cortex and slitlike fatty hilum. Multiple additional smaller right cervical chain lymph nodes with normal morphology.     There is a left level 1B lymph node measuring 1.0 x 0.6 x 1.0 cm, previously 1.1 x 0.6 x 0.6 cm. No distinct fatty hilum is visualized. There is also a left level 6 lymph node which measures 1.5 x 0.8 x 0.6 cm and has an abnormal morphology with no   distinct fatty hilum.                                                                    IMPRESSION:  1.  Heterogeneous thyroid with scattered benign cysts. No suspicious solid thyroid nodule.  2.  Mild interval growth of bilateral level 1B lymph nodes with abnormal morphology. There is also a left level 6 lymph node with abnormal morphology. Tissue sampling of at least the dominant right level 1B lymph node is recommended.      EXAM: CT SOFT TISSUE NECK W/O CONTRAST  LOCATION: [facility]  DATE/TIME: 12/13/2022 8:25 AM     INDICATION: Adenopathy palpable under right chin and right lateral base of neck and right mid neck. Largest approx 1.3 cm. Smoker. Nodes present 6 mos per patient, anaphylactic reaction to contrast dye.  COMPARISON: None.  TECHNIQUE: Routine CT Soft Tissue Neck without IV contrast. Multiplanar reformats. Dose reduction techniques were used.     FINDINGS:      MUCOSAL SPACES/SOFT TISSUES: Normal mucosal spaces of the upper aerodigestive tract within the limits of noncontrast imaging. No definite mucosal mass or inflammation identified. Normal vocal cords and infraglottic trachea. Normal parapharyngeal space   and subcutaneous soft tissues. Normal oral cavity,  spaces, and floor of mouth structures.     LYMPH NODES: There is a single mildly prominent right level IB (submandibular) lymph node measuring 1.5 cm long axis. There is no other cervical lymphadenopathy.      SALIVARY GLANDS: Normal parotid and submandibular glands.     THYROID: The thyroid gland demonstrates heterogeneous density  likely representing scattered bilateral thyroid nodules. Thyroid ultrasound recommended for better characterization and grading.      VISUALIZED INTRACRANIAL/ORBITS/SINUSES: No abnormality of the visualized intracranial compartment or orbits. Visualized paranasal sinuses and mastoid air cells are clear.     OTHER: No destructive osseous lesion. The included lung apices are clear.                                                                      IMPRESSION:   1.  Single mildly prominent right level IB lymph node measuring 1.5 cm long axis. This is nonspecific. No other cervical lymphadenopathy.  2.  Heterogeneous appearance of the thyroid gland likely representing underlying nodules. Thyroid ultrasound recommended for better characterization and grading.  3.  Otherwise, normal soft tissue neck CT.         Pt denies night sweats, F/C.  Continues to smoke and not motivated to quit at this time.  Denies recent coughing.  Mild chronic shortness of breath.  Denies chest pain.  Denies current abdominal pain.  Gets rare soreness in her epigastric area with bending over.  This will then cause some discomfort for about 5 to 10 minutes and resolved.  History moderate hiatal hernia seen on previous imaging.    Denies seeing blood in stools.  Not on PPI at this time  For breast cancer screening with mammogram.  Has not had Bivalent covid booster vaccine      Additional ROS:   Constitutional, HEENT, Cardiovascular, Pulmonary, GI and , Neuro, MSK and Psych review of systems/symptoms are otherwise negative or unchanged from previous, except as noted above.           Objective :  Reports blood pressure 126/63 with weight 116 pounds today    Physical Exam:  GENERAL:   alert and no distress  EYES: Eyes grossly normal to inspection, conjunctivae and sclerae normal  RESP: no audible wheeze, cough, or visible cyanosis.  No visible retractions or increased work of breathing.  Able to speak fully in complete sentences.  NEURO: Cranial  nerves grossly intact, mentation intact and speech normal  PSYCH: mentation appears normal, affect normal/bright, judgement and insight intact, normal speech and appearance well-groomed

## 2023-05-02 NOTE — PROGRESS NOTES
[place] [facility]    Shirley Hendricks returns for vascular follow-up.  She is undergone multiple interventions and most closely following her right leg bypass graft.  Several revision with AT runoff only.  Open cutdown with angioplasty of jump graft 12/28/2022.  Graft patent on 3/2/2023 duplex with mild stenosis in cephalic vein jump graft angioplastied area.     Prior aortobifemoral bypass graft and left femoral to popliteal graft all functioning well   Primary repair of left femoral pseudoaneurysm-12/20/2022   Right CEA-5/11/2016   Left CEA- 6/8/2020   Failed right graft to above-knee popliteal in situ with revisions   Right graft to above-knee popliteal cadaveric SFA bypass    -Distally popliteal occlusion with jump graft to distal popliteal/tibial arteries-2/15/2022    PMH: Medications: Lisinopril, Coreg, Norvasc, Prolia, Neurontin, Crestor, Prilosec, Plavix, aspirin, Breo Ellipta inhaler   Occasional cigar smoker (will never quit completely)    ROS: No claudication symptoms.  Does have her balance issues followed by Dr. Benoit      Exam: Alert and appropriate.  Normal affect.  Comfortable.   Walking easily.   Blood pressure 169/79 right arm.  Pulse 60   HEENT= well-healed carotid incisions.   Chest= clear    Abdomen= nontender, well-healed midline incision    Well-healed left groin incision   Extremities= no edema.  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    +3 bilateral graft pulses and +3 right DP pulse     +3 biphasic right DP Doppler.  +2/3 monophasic left pedal pulses          Left graft duplex reveals this is widely patent.      Right graft duplex is also patent.  Stenosis in the mid jump graft 3.3 mm (previously 2.3 mm).  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        IMPRESSION: #1.  Widely patent left leg bypass graft.  Reevaluation in 1 year.     #2.  Stable right leg bypass  graft.  Mild stenosis in jump graft but improved diameter and flow.  Palpable DP pulse.  Follow-up duplex 6 months.  Chronic aspirin/Plavix     #3.  Bilateral carotid endarterectomy.  Follow-up duplex 6 months also.    25 minutes with patient today.      Chadwick Lozano MD   This note was created using Dragon voice recognition software which may result in transcription errors.

## 2023-05-04 ENCOUNTER — OFFICE VISIT (OUTPATIENT)
Dept: OTHER | Facility: CLINIC | Age: 63
End: 2023-05-04
Attending: SURGERY
Payer: COMMERCIAL

## 2023-05-04 ENCOUNTER — HOSPITAL ENCOUNTER (OUTPATIENT)
Dept: ULTRASOUND IMAGING | Facility: CLINIC | Age: 63
Discharge: HOME | End: 2023-05-04
Attending: SURGERY
Payer: COMMERCIAL

## 2023-05-04 VITALS — DIASTOLIC BLOOD PRESSURE: 79 MMHG | HEART RATE: 60 BPM | SYSTOLIC BLOOD PRESSURE: 169 MMHG

## 2023-05-04 DIAGNOSIS — Z98.890 HISTORY OF BILATERAL CAROTID ENDARTERECTOMY: ICD-10-CM

## 2023-05-04 DIAGNOSIS — I73.9 PAD (PERIPHERAL ARTERY DISEASE) (H): ICD-10-CM

## 2023-05-04 DIAGNOSIS — E78.5 HYPERLIPIDEMIA LDL GOAL <70: ICD-10-CM

## 2023-05-04 DIAGNOSIS — I73.9 PAD (PERIPHERAL ARTERY DISEASE) (H): Primary | ICD-10-CM

## 2023-05-04 PROCEDURE — 99214 OFFICE O/P EST MOD 30 MIN: CPT | Performed by: SURGERY

## 2023-05-04 PROCEDURE — 93925 LOWER EXTREMITY STUDY: CPT

## 2023-05-04 PROCEDURE — G0463 HOSPITAL OUTPT CLINIC VISIT: HCPCS | Mod: 25

## 2023-05-04 PROCEDURE — 93923 UPR/LXTR ART STDY 3+ LVLS: CPT

## 2023-05-04 NOTE — PROGRESS NOTES
[facility]        Patient is here for a follow up.     Pt is currently taking Aspirin, Statin and Plavix.    BP (!) 169/79 (BP Location: Right arm, Patient Position: Chair, Cuff Size: Adult Regular)   Pulse 60   LMP  (LMP Unknown)     The provider has been notified that the patient has no concerns.     Questions patient would like addressed today are: N/A.    Refills are needed: N/A    Has homecare services and agency name:  Isa Al MA

## 2023-05-05 DIAGNOSIS — I10 ESSENTIAL HYPERTENSION: ICD-10-CM

## 2023-05-07 RX ORDER — CARVEDILOL 6.25 MG/1
TABLET ORAL
Qty: 180 TABLET | Refills: 3 | Status: ON HOLD | OUTPATIENT
Start: 2023-05-07 | End: 2023-10-14

## 2023-05-17 ENCOUNTER — APPOINTMENT (OUTPATIENT)
Dept: ULTRASOUND IMAGING | Facility: CLINIC | Age: 63
End: 2023-05-17
Attending: EMERGENCY MEDICINE
Payer: COMMERCIAL

## 2023-05-17 ENCOUNTER — TELEPHONE (OUTPATIENT)
Dept: OTHER | Facility: CLINIC | Age: 63
End: 2023-05-17

## 2023-05-17 ENCOUNTER — HOSPITAL ENCOUNTER (EMERGENCY)
Facility: CLINIC | Age: 63
Discharge: HOME | End: 2023-05-17
Attending: EMERGENCY MEDICINE | Admitting: EMERGENCY MEDICINE
Payer: COMMERCIAL

## 2023-05-17 VITALS
DIASTOLIC BLOOD PRESSURE: 72 MMHG | SYSTOLIC BLOOD PRESSURE: 165 MMHG | TEMPERATURE: 97.7 F | OXYGEN SATURATION: 98 % | RESPIRATION RATE: 18 BRPM | HEART RATE: 64 BPM

## 2023-05-17 DIAGNOSIS — T82.898A OCCLUSION OF ARTERIAL BYPASS GRAFT, INITIAL ENCOUNTER (H): ICD-10-CM

## 2023-05-17 LAB
ANION GAP SERPL CALCULATED.3IONS-SCNC: 13 MMOL/L (ref 7–15)
APTT PPP: 24 SECONDS (ref 22–38)
BASOPHILS # BLD AUTO: 0.1 10E3/UL (ref 0–0.2)
BASOPHILS NFR BLD AUTO: 1 %
BUN SERPL-MCNC: 13.7 MG/DL (ref 8–23)
CALCIUM SERPL-MCNC: 9.2 MG/DL (ref 8.8–10.2)
CHLORIDE SERPL-SCNC: 100 MMOL/L (ref 98–107)
CREAT SERPL-MCNC: 0.74 MG/DL (ref 0.51–0.95)
DEPRECATED HCO3 PLAS-SCNC: 22 MMOL/L (ref 22–29)
EOSINOPHIL # BLD AUTO: 0.1 10E3/UL (ref 0–0.7)
EOSINOPHIL NFR BLD AUTO: 2 %
ERYTHROCYTE [DISTWIDTH] IN BLOOD BY AUTOMATED COUNT: 15.3 % (ref 10–15)
GFR SERPL CREATININE-BSD FRML MDRD: 90 ML/MIN/1.73M2
GLUCOSE SERPL-MCNC: 99 MG/DL (ref 70–99)
HCT VFR BLD AUTO: 40.1 % (ref 35–47)
HGB BLD-MCNC: 13.3 G/DL (ref 11.7–15.7)
IMM GRANULOCYTES # BLD: 0 10E3/UL
IMM GRANULOCYTES NFR BLD: 0 %
INR PPP: 0.94 (ref 0.85–1.15)
LYMPHOCYTES # BLD AUTO: 1.2 10E3/UL (ref 0.8–5.3)
LYMPHOCYTES NFR BLD AUTO: 19 %
MCH RBC QN AUTO: 27.9 PG (ref 26.5–33)
MCHC RBC AUTO-ENTMCNC: 33.2 G/DL (ref 31.5–36.5)
MCV RBC AUTO: 84 FL (ref 78–100)
MONOCYTES # BLD AUTO: 0.5 10E3/UL (ref 0–1.3)
MONOCYTES NFR BLD AUTO: 8 %
NEUTROPHILS # BLD AUTO: 4.4 10E3/UL (ref 1.6–8.3)
NEUTROPHILS NFR BLD AUTO: 70 %
NRBC # BLD AUTO: 0 10E3/UL
NRBC BLD AUTO-RTO: 0 /100
PLATELET # BLD AUTO: 200 10E3/UL (ref 150–450)
POTASSIUM SERPL-SCNC: 4.6 MMOL/L (ref 3.4–5.3)
RBC # BLD AUTO: 4.77 10E6/UL (ref 3.8–5.2)
SODIUM SERPL-SCNC: 135 MMOL/L (ref 136–145)
WBC # BLD AUTO: 6.4 10E3/UL (ref 4–11)

## 2023-05-17 PROCEDURE — 85610 PROTHROMBIN TIME: CPT | Performed by: EMERGENCY MEDICINE

## 2023-05-17 PROCEDURE — 36415 COLL VENOUS BLD VENIPUNCTURE: CPT | Performed by: EMERGENCY MEDICINE

## 2023-05-17 PROCEDURE — 85004 AUTOMATED DIFF WBC COUNT: CPT | Performed by: EMERGENCY MEDICINE

## 2023-05-17 PROCEDURE — 99214 OFFICE O/P EST MOD 30 MIN: CPT | Performed by: SURGERY

## 2023-05-17 PROCEDURE — 85730 THROMBOPLASTIN TIME PARTIAL: CPT | Performed by: EMERGENCY MEDICINE

## 2023-05-17 PROCEDURE — 93971 EXTREMITY STUDY: CPT | Mod: RT

## 2023-05-17 PROCEDURE — 99285 EMERGENCY DEPT VISIT HI MDM: CPT | Mod: 25

## 2023-05-17 PROCEDURE — 82310 ASSAY OF CALCIUM: CPT | Performed by: EMERGENCY MEDICINE

## 2023-05-17 PROCEDURE — 93926 LOWER EXTREMITY STUDY: CPT | Mod: RT

## 2023-05-17 ASSESSMENT — ACTIVITIES OF DAILY LIVING (ADL): ADLS_ACUITY_SCORE: 35

## 2023-05-17 NOTE — ED PROVIDER NOTES
Emergency Department Attending Supervision Note  5/17/2023  2:06 PM      I evaluated this patient in conjunction with Lakia Gramajo PA-C      Briefly, the patient presented with intermittent right calf pain that began about a week and a half ago and has been increasing in frequency. Her foot appears white and numb in the mornings, but this improves throughout the day. Of note, she is s/p right leg bypass graft from 12/28/22. She has been taking her Plavix, but not her Aspirin. No fever, chills, chest pain, or shortness of breath. No known trauma or injury.       On my exam  BP (!) 165/72   Pulse 64   Temp 97.7  F (36.5  C) (Temporal)   Resp 18   LMP  (LMP Unknown)   SpO2 98%   General: Alert, interactive   Head:  Scalp is atraumatic  Eyes:  The pupils are equal, round, and reactive to light    EOM's intact    No scleral icterus  ENT:      Nose:  The external nose is normal  Ears:  External ears are normal     Neck:  Normal range of motion.      There is no rigidity.    Trachea is in the midline         CV:  Regular rate and rhythm    DP and PT pulse on the right lower extremity are not palpable, I was able to briefly Doppler a right DP pulse  Resp:  Breath sounds are clear bilaterally    Non-labored, no retractions or accessory muscle use    Skin:  Warm and dry, toes of the right lower extremity are warm to the touch, no cyanosis  Neuro: Strength 5/5 x4.  Sensation is decreased in the right foot   psych:  Awake. Alert.  Normal affect.      Appropriate interactions.    Results:      ED course:    Laboratory:  Labs Ordered and Resulted from Time of ED Arrival to Time of ED Departure   BASIC METABOLIC PANEL - Abnormal       Result Value    Sodium 135 (*)     Potassium 4.6      Chloride 100      Carbon Dioxide (CO2) 22      Anion Gap 13      Urea Nitrogen 13.7      Creatinine 0.74      Calcium 9.2      Glucose 99      GFR Estimate 90     CBC WITH PLATELETS AND DIFFERENTIAL - Abnormal    WBC Count 6.4       RBC Count 4.77      Hemoglobin 13.3      Hematocrit 40.1      MCV 84      MCH 27.9      MCHC 33.2      RDW 15.3 (*)     Platelet Count 200      % Neutrophils 70      % Lymphocytes 19      % Monocytes 8      % Eosinophils 2      % Basophils 1      % Immature Granulocytes 0      NRBCs per 100 WBC 0      Absolute Neutrophils 4.4      Absolute Lymphocytes 1.2      Absolute Monocytes 0.5      Absolute Eosinophils 0.1      Absolute Basophils 0.1      Absolute Immature Granulocytes 0.0      Absolute NRBCs 0.0     INR - Normal    INR 0.94     PARTIAL THROMBOPLASTIN TIME - Normal    aPTT 24       Imaging:  US Lower Extremity Venous Duplex Right   Final Result   IMPRESSION: No evidence of deep venous thrombosis.      ALBERTO BENITEZ MD            SYSTEM ID:  [id number]      US Lower Extremity Arterial Duplex Right   Final Result   IMPRESSION: Occluded jump graft in the right lower extremity extending   from a right femoral to above-knee popliteal surgical bypass graft.   Monophasic blood flow in the distal anterior tibial artery.      Please note above findings were discussed by myself to Dr. Pedro in   the Emergency Department at 4:00 PM on May 17, 2023. Dr. Lozano also is   aware.       NICOLAS HOPSON MD            SYSTEM ID:  [id number]        Assessments/Consultations:  [id number] I spoke with Dr. Lozano from vascular surgery after his visit with the patient.     Medical decision making: Following presentation history and physical examination were performed, the above work-up was undertaken.  Dr. Lozano the patient's vascular surgeon saw the patient in the emergency department and has arranged outpatient follow-up.  Patient subsequently eloped prior to completion of her work-up however laboratory and venous ultrasound are unremarkable.  She will follow-up with her surgeon return to the emergency department if new symptoms develop.      Diagnosis    ICD-10-CM    1. Occlusion of arterial bypass graft, initial encounter (H)  T82.898A            Trigger, Guillermo Spicer MD             TriggerGuillermo MD  05/17/23 1952

## 2023-05-17 NOTE — CONSULTS
VASCULAR SURGERY    Shirley Hendricks called us today complaining of some tingling in her right foot with coolness and discomfort with walking.  She could not feel her right leg bypass graft at the level of the knee.  We had her come to the emergency department where she was evaluated by Dr. Lakhani.      She has a right femoral limb  (ABF) 2 above-knee popliteal cadaveric SFA bypass.  Distal popliteal artery occluded and she had retrograde flow that was quite limited.  Jump graft using her left arm cephalic vein was unsuccessful and a cadaveric SFA graft was placed.  This required angioplasty of the jump graft to the distal popliteal artery and the origin of the AT runoff on 12/28/2022.  Has been on chronic aspirin and Plavix.  Known mild stenosis of the jump graft with the more proximal femoral to above-knee popliteal graft widely patent.  Doing well in the office on 5/4/2023 with palpable graft pulses bilaterally in the right DP.  Jump graft with minimum diameter 3.3 mm.      Duplex ultrasound just completed reveals that the femoral to above-knee graft is widely patent but the jump graft is occluded.  Flow was noted the anterior tibial artery.    Exam: Patient was seen in the ED following the duplex.   She is very comfortable.  No rest pain   Strong palpable pulse in the right and left femoral to above-knee   popliteal grafts but no pulse in the right jump Graft.   Right foot is slightly cooler than the left but normal CMS.    Laboratory: K= 4.6 SCr= 0.74 Hgb= 13.3      IMPRESSION: Occlusion of the jump graft from the distal segment of the right femoral to above-knee popliteal field cadaveric graft.  Flow via collaterals to the anterior tibial artery.  With the diseased jump graft I do not feel that angiography and attempts to reopen this will be successful in the long term.     I have recommended we perform a bypass from the distal segment of the right graft down to the anterior tibial artery and a lateral  approach.  We would use the cadaveric SFA artery proximally 3 cm in length for the bypass.       Patient has developed some coolness and tingling along with short distance claudication but has adequate collaterals.  She will be discharged from the emergency department.  We will obtain the cadaveric SFA graft and plan surgery on next week.  She will take her Plavix today and tomorrow and then hold this thereafter depending surgery.    Over 35 minutes with patient today.      Chadwick Lozano MD,

## 2023-05-17 NOTE — ED NOTES
Rapid Assessment Note    History:   Shirley Hendricks is a 62 year old female who presents with right calf pain. The patient provides that for the last few weeks, she has had an ongoing pain in her right calf that is worse when laying down or walking. She notes she had multiple procedures done in the right leg recently. She followed with Dr. Lozano of Vascular on 5/4 where she had an ultrasound done on the leg at the time.    Exam:   General:  Alert, interactive  Cardiovascular:  Well perfused  Lungs:  No respiratory distress, no accessory muscle use  MSK:  Pain in right calf, unable to appreciably palpate DP or PT pulse  Neuro:  Moving all 4 extremities  Skin:  Warm, dry, cyanotic toes, prolonged capillary refill  Psych:  Normal affect    Plan of Care:   I evaluated the patient and developed an initial plan of care. I discussed this plan and explained that I, or one of my partners, would be returning to complete the evaluation.         I, Yamil Olivo, am serving as a scribe to document services personally performed by Jaqueline Pedro MD, based on my observations and the provider's statements to me.    5/17/2023  [employer]    Portions of this medical record were completed by a scribe. UPON MY REVIEW AND AUTHENTICATION BY ELECTRONIC SIGNATURE, this confirms (a) I performed the applicable clinical services, and (b) the record is accurate.      Jaqueline Pedro MD  05/17/23 4021

## 2023-05-17 NOTE — TELEPHONE ENCOUNTER
[facility]    Who is the name of the provider? Dr Lozano     What is the location you see this provider at/preferred location? [place]    Person calling / Facility: Shirley    Phone number:  [phone]    Nurse call back needed:  Y    Reason for call:   Pt is having problems with her R foot - it feels like dead weight - she can hardly lift up or walk -cant walk without stopping to rest - pale color especially in the morning      Pharmacy location:  N/A    Outside Imaging: N/A    Can we leave a detailed message on this number?  Y    Additional Info: Pt stated that Dr Lozano said to call right away if she started having problems

## 2023-05-17 NOTE — ED PROVIDER NOTES
History   Chief Complaint:  Leg Pain     HPI   Shirley Hendricks is a 62 year old female with a history of COPD, hyperlipidemia, hypertension, MI, lupus, PAD, CAD, and CMT who presents with right leg pain.  Patient states that a week and a half ago she developed leg pain that would wake her up in the middle of the night.  She notes that when she wakes up in the morning her foot appears white and does not have any sensation, but these symptoms will improve throughout the day. Last night she had increased episodes of pain causing concern.  Of note, the patient has an extensive vascular history and is s/p right leg bypass graft from 12/28/2022.  She has advised to take a baby aspirin and Plavix for this and takes the Plavix daily but has not been taking the aspirin. She denies any fever, chills, upper respiratory symptoms, chest pain, shortness of breath, or recent trauma/injury.    Independent Historian:   None - Patient Only    Review of External Notes:   5/4/2023-vascular surgery office visit, she was noted to have a patent right graft.  Native popliteal artery is chronically occluded with retrograde flow above the knee. DP pulse palpable and she was advised to follow-up in 6 months for duplex.      ROS:  Review of Systems    Allergies:  Contrast Dye  Pantoprazole  Chantix  Penicillins     Medications:    Plavix  Aspirin 81 mg  Amlodipine  Carvedilol  Prolia injections  Gabapentin  Lisinopril  Nitroglycerin  Omeprazole  Rosuvastatin    Past Medical History:    COPD  Hyperlipidemia  Hypertension  Lupus  MI  PAD  SBO  SVT  Thrombocytopenia  CAD  Pseudoaneurysm of femoral artery  Tobacco use disorder  Embolism and thrombosis of unspecified artery  Anxiety  Xgnsguo-Evrof-Gnibmr disease  Bilateral carpal tunnel syndrome  GERD  Mild major depression  Osteoarthrosis  Reflux esophagitis  Asthma  Cervicalgia  Osteoporosis  Degenerative disc disease    Past Surgical History:    Femoropopliteal bypass graft x3  Cardiac  catheterization  Bilateral carpal tunnel release  Bilateral carotid endarterectomy   Fluorescence intraoperative vascular angiography  Left salpingectomy   IR OR angiogram x2  Cholecystectomy  Repair left femoral artery aneurysm   Angioplasty with stent  Fabric wrapping of abdominal aneurysm   Left knee surgery     Family History:    Mother- cancer  Father- cardiovascular disease  Brother-  Blood disease    Social History:  The patient presents via private vehicle  Patient is a current smoker    Physical Exam     Patient Vitals for the past 24 hrs:   BP Temp Temp src Pulse Resp SpO2   05/17/23 1307 (!) 165/72 97.7  F (36.5  C) Temporal 64 18 98 %      Physical Exam  General: Alert, appears well-developed and well-nourished. Cooperative.     In minimal distress  HEENT:  Head:  Atraumatic  Ears:  External ears are normal  Eyes:   Conjunctivae normal and EOM are normal. No scleral icterus.    Pupils are equal, round, and reactive to light.   Neck:   Normal range of motion.   CV:  DP pulse on right identified with Doppler. Left DP and PT pulse palpable.  Resp:  Breath sounds are clear bilaterally    Non-labored, no retractions or accessory muscle use  MS:  Normal range of motion. No edema. Well healed surgical incisions of bilateral lower extremities. Cool sensation of right lower extremity compared to left.  DP pulse identified with Doppler on right.  DP and PT pulses palpable of LLE.  Dorsiflexion/plantarflexion intact bilaterally.  Calves soft and nontender bilaterally, no edema or erythema of bilateral lower extremities.  Skin appears mildly pale of right lower leg compared to left.    Normal strength in all 4 extremities.   Skin:  Warm and dry.  No rash or lesions noted.  Neuro: Alert. Normal strength.  Sensation intact in all 4 extremities. GCS: 15  Psych:  Normal mood and affect.    Emergency Department Course   Imaging:  US Lower Extremity Venous Duplex Right   Final Result   IMPRESSION: No evidence of deep  venous thrombosis.      ALBERTO BENITEZ MD            SYSTEM ID:  [id number]      US Lower Extremity Arterial Duplex Right   Final Result   IMPRESSION: Occluded jump graft in the right lower extremity extending   from a right femoral to above-knee popliteal surgical bypass graft.   Monophasic blood flow in the distal anterior tibial artery.      Please note above findings were discussed by myself to Dr. Pedro in   the Emergency Department at 4:00 PM on May 17, 2023. Dr. Lozano also is   aware.       NICOLAS HOPSON MD            SYSTEM ID:  [id number]         Report per radiology    Laboratory:  Labs Ordered and Resulted from Time of ED Arrival to Time of ED Departure   BASIC METABOLIC PANEL - Abnormal       Result Value    Sodium 135 (*)     Potassium 4.6      Chloride 100      Carbon Dioxide (CO2) 22      Anion Gap 13      Urea Nitrogen 13.7      Creatinine 0.74      Calcium 9.2      Glucose 99      GFR Estimate 90     CBC WITH PLATELETS AND DIFFERENTIAL - Abnormal    WBC Count 6.4      RBC Count 4.77      Hemoglobin 13.3      Hematocrit 40.1      MCV 84      MCH 27.9      MCHC 33.2      RDW 15.3 (*)     Platelet Count 200      % Neutrophils 70      % Lymphocytes 19      % Monocytes 8      % Eosinophils 2      % Basophils 1      % Immature Granulocytes 0      NRBCs per 100 WBC 0      Absolute Neutrophils 4.4      Absolute Lymphocytes 1.2      Absolute Monocytes 0.5      Absolute Eosinophils 0.1      Absolute Basophils 0.1      Absolute Immature Granulocytes 0.0      Absolute NRBCs 0.0     INR - Normal    INR 0.94     PARTIAL THROMBOPLASTIN TIME - Normal    aPTT 24        Procedures   None    Emergency Department Course & Assessments:     Interventions:  Medications - No data to display     Assessments:  1348 I obtained history and examined the patient as noted above.   1601: Went to patient's room to reexamine and discuss US findings, patient was not in the room at the time    Independent Interpretation (X-rays,  CTs, rhythm strip):  None    Consultations/Discussion of Management or Tests:  1507: Dr. Lakhani discussed the case with Dr. Lozano from vascular surgery who thought the patient would be safe for discharge to home with close follow up in the vascular clinic  1601: Went to patient's room to reexamine and discuss US findings, patient was not in the room at the time    Social Determinants of Health affecting care:   None    Disposition:  The patient eloped prior to discharge.    Impression & Plan    CMS Diagnoses: None    Medical Decision Making:  Shirley Hendricks is a 62 year old female with a history of COPD, hyperlipidemia, hypertension, MI, PAD, CAD, who presented with right leg pain that been ongoing for the last 1.5 weeks, worsening last night.  She has an extensive vascular history and is s/p right leg bypass graft from 12/28/2022.  On exam the patient appears to have a cool extremity with DP pulse identified on Doppler.  Blood work does not show any signs concerning for infection, electrolyte abnormalities, and coagulation studies are within normal limits.  Patient is noted to be moderately hypertensive with vital signs otherwise within normal limits.  Ultrasound showed evidence of an occluded graft in the right lower extremity.  These findings were discussed with Dr. Lozano who advised that the patient may be discharged home with close follow-up in clinic within the next week.  At the time we attempted to discuss these findings with the patient she had eloped.  However, Dr. Lozano was able to see the patient in the emergency department and discussed the plan with her.     Diagnosis:    ICD-10-CM    1. Occlusion of arterial bypass graft, initial encounter (H)  T82.898A            Discharge Medications:  Discharge Medication List as of 5/17/2023  4:10 PM         Scribe Disclosure:  Liz IRAHETA, mona serving as a scribe at 1:44 PM on 5/17/2023 to document services personally performed by Lakia Gramajo PA-C   based on my observations and the provider's statements to me.      5/17/2023   Lakia Gramajo PA-C

## 2023-05-17 NOTE — TELEPHONE ENCOUNTER
LOV 5/4/23 with Dr. Lozano.    -Stable right leg bypass graft.  Mild stenosis in jump graft but improved diameter and flow.  Palpable DP pulse.  Follow-up duplex 6 months.  Chronic aspirin/Plavix    -5/4/23 BLE arterial US notes:  Interval increase in peak systolic velocity in the distal aspects of the right jump graft and at the distal anastomosis. This could represent recurrent stenoses. Correlate with ankle brachial indices  and clinical exam. If indicated, further evaluation with a CT angiogram could be performed.    -History of anaphylaxis allergy to contrast dye.    Spoke with patient.  She reports right leg pain that extends from her knee down to her foot.  This has been present for approximately one week.  She reports it is difficult to move her foot, however she was able to walk around the grocery store, albeit extremely painful.  Patient has previously felt for graft pulse and is no longer able to feel this.  Reports right foot is more pallor than left leg.  Reports nocturnal rest pain.    Given the above symptoms, I advised patient to present to [facility] ED as soon as possible.  Pt was in agreement with this plan and verbalized understanding.  Will update Dr. Lozano.    Alaina Yanez, ESTHERN, RN-[facility] [place]

## 2023-05-17 NOTE — ED TRIAGE NOTES
Right below knee pain and decreased pulse in the foot; keeps pt up at night; pt had a vascular scan a couple weeks ago

## 2023-05-18 ENCOUNTER — TELEPHONE (OUTPATIENT)
Dept: OTHER | Facility: CLINIC | Age: 63
End: 2023-05-18
Payer: COMMERCIAL

## 2023-05-18 DIAGNOSIS — T82.898A: Primary | ICD-10-CM

## 2023-05-18 NOTE — TELEPHONE ENCOUNTER
LVM with patient regarding recommended procedure.  Will send preoperative information to patient's MyChart and requested she reach out with any questions.    Alaina Yanez, BSN, RN-[facility]

## 2023-05-19 ENCOUNTER — TELEPHONE (OUTPATIENT)
Dept: OTHER | Facility: CLINIC | Age: 63
End: 2023-05-19
Payer: COMMERCIAL

## 2023-05-19 NOTE — TELEPHONE ENCOUNTER
Received case request for: RIGHT DISTAL SFA GRAFT TO ANTERIOR TIBIAL ARTERY WITH 30CM CADAVERIC SFA GRAFT    Case request:[id number]    Spoke with Shirley and notified her of date/time procedure and post op.  Patient states she has all the information she needs and does not need the surgery packet.

## 2023-05-24 NOTE — TELEPHONE ENCOUNTER
Spoke with patient.  She wanted to confirm her check-in time and surgery time.  Reviewed this information with her.  She said Rafa did not have the times.

## 2023-05-25 ENCOUNTER — TELEPHONE (OUTPATIENT)
Dept: OTHER | Facility: CLINIC | Age: 63
End: 2023-05-25
Payer: COMMERCIAL

## 2023-05-25 RX ORDER — ACETAMINOPHEN 500 MG
500-1000 TABLET ORAL EVERY 6 HOURS PRN
Status: ON HOLD | COMMUNITY
End: 2024-06-07

## 2023-05-25 NOTE — PROGRESS NOTES
PTA medications updated by Medication Scribe prior to surgery via phone call with patient (last doses completed by Nurse)     Medication history sources: Patient, Surescripts and (Vascular and Emergency Department notes)  In the past week, patient estimated taking medication this percent of the time: Greater than 90%      Significant changes made to the medication list:  None      Additional medication history information:   Patient was advised to bring: Breo Ellipta    Medication reconciliation completed by provider prior to medication history? No    Time spent in this activity: 40 minutes    The information provided in this note is only as accurate as the sources available at the time of update(s)      Prior to Admission medications    Medication Sig Last Dose Taking? Auth Provider Long Term End Date   acetaminophen (TYLENOL) 500 MG tablet Take 500-1,000 mg by mouth every 6 hours as needed for mild pain Unknown at PRN Yes Reported, Patient No    amLODIPine (NORVASC) 5 MG tablet Take 1 tablet (5 mg) by mouth 2 times daily  at AM Yes Vasquez Benoit MD Yes    aspirin (ASA) 81 MG chewable tablet Take 1 tablet (81 mg) by mouth daily 5/23/2023 at AM Yes Chadwick Lozano MD BREO ELLIPTA 200-25 MCG/ACT inhaler Inhale 1 puff into the lungs daily  at AM Yes Vasquez Benoit MD     Calcium Carb-Cholecalciferol (CALCIUM CARBONATE-VITAMIN D3) 600-400 MG-UNIT TABS TAKE ONE TABLET BY MOUTH TWICE DAILY  at PM Yes Vasquez Benoit MD     carvedilol (COREG) 6.25 MG tablet TAKE ONE TABLET BY MOUTH TWICE A DAY WITH MEALS  at AM Yes Vasquez Benoit MD Yes    clopidogrel (PLAVIX) 75 MG tablet Take 1 tablet (75 mg) by mouth daily 5/20/2023 at AM Yes Vasquez Benoit MD Yes    denosumab (PROLIA) 60 MG/ML SOLN injection Inject 1 mL (60 mg) Subcutaneous every 6 months INDICATION: TO TREAT OSTEOPOROSIS > year Yes Vasquez Benoit MD Yes    diphenhydrAMINE (BENADRYL) 25 MG tablet take Benadryl 25-50 mg one hour prior to the procedure Unknown at  PRN Yes Chadwick Lozano MD     gabapentin (NEURONTIN) 100 MG capsule Take 1 capsule (100 mg) by mouth 3 times daily  at AM Yes Chadwick Lozano MD Yes    lisinopril (ZESTRIL) 20 MG tablet Take 1 tablet (20 mg) by mouth 2 times daily.  at AM Yes Vasquez Benoit MD Yes    nitroGLYcerin (NITROSTAT) 0.4 MG sublingual tablet Place 1 tablet (0.4 mg) under the tongue See Admin Instructions for chest pain > year Yes Vasquez Benoit MD Yes    omeprazole (PRILOSEC) 20 MG DR capsule TAKE ONE CAPSULE BY MOUTH DAILY  at AM Yes Vasquez Benoit MD     rosuvastatin (CRESTOR) 40 MG tablet Take 1 tablet (40 mg) by mouth At Bedtime  at HS Yes Vasquez Benoit MD Yes

## 2023-05-25 NOTE — TELEPHONE ENCOUNTER
[place] [facility]    I called Shirley Hendricks about her surgery tomorrow morning.  He has a right femoral graft to above-knee popliteal cadaveric SFA bypass it is occluded distally with retrograde flow.  We have performed a graft from the distal segment to the below-knee popliteal artery that needed to be redone with a cephalic vein followed by angioplasty for several graft stenosis and outflow AT stenosis.  This jump graft is now occluded and she is symptomatic.    Tomorrow we plan using another cadaveric SFA artery from the distal femoral-popliteal graft to the anterior tibial artery with a lateral approach.  She had no questions about the upcoming procedure  .  Laboratory testing from 5/17/2023 is unremarkable with a hemoglobin= 13.3    Chadwick Lozano MD

## 2023-05-26 ENCOUNTER — HOSPITAL ENCOUNTER (INPATIENT)
Facility: CLINIC | Age: 63
LOS: 1 days | Discharge: HOME | DRG: 254 | End: 2023-05-27
Attending: SURGERY | Admitting: SURGERY
Payer: COMMERCIAL

## 2023-05-26 ENCOUNTER — ANESTHESIA (OUTPATIENT)
Dept: SURGERY | Facility: CLINIC | Age: 63
DRG: 254 | End: 2023-05-26
Payer: COMMERCIAL

## 2023-05-26 ENCOUNTER — ANESTHESIA EVENT (OUTPATIENT)
Dept: SURGERY | Facility: CLINIC | Age: 63
DRG: 254 | End: 2023-05-26
Payer: COMMERCIAL

## 2023-05-26 ENCOUNTER — APPOINTMENT (OUTPATIENT)
Dept: SURGERY | Facility: PHYSICIAN GROUP | Age: 63
End: 2023-05-26
Payer: COMMERCIAL

## 2023-05-26 DIAGNOSIS — Z98.890 CRITICAL ISCHEMIA OF EXTREMITY WITH HISTORY OF REVASCULARIZATION OF SAME EXTREMITY (H): Primary | ICD-10-CM

## 2023-05-26 DIAGNOSIS — I70.391 ATHEROSCLEROSIS OF BYPASS GRAFT OF RIGHT LOWER EXTREMITY WITH OTHER CLINICAL MANIFESTATION (H): ICD-10-CM

## 2023-05-26 DIAGNOSIS — I70.229 CRITICAL ISCHEMIA OF EXTREMITY WITH HISTORY OF REVASCULARIZATION OF SAME EXTREMITY (H): Primary | ICD-10-CM

## 2023-05-26 LAB
ABO/RH(D): NORMAL
ANTIBODY SCREEN: NEGATIVE
GLUCOSE BLDC GLUCOMTR-MCNC: 87 MG/DL (ref 70–99)
SPECIMEN EXPIRATION DATE: NORMAL

## 2023-05-26 PROCEDURE — 250N000011 HC RX IP 250 OP 636: Performed by: STUDENT IN AN ORGANIZED HEALTH CARE EDUCATION/TRAINING PROGRAM

## 2023-05-26 PROCEDURE — 250N000011 HC RX IP 250 OP 636: Performed by: NURSE ANESTHETIST, CERTIFIED REGISTERED

## 2023-05-26 PROCEDURE — 258N000003 HC RX IP 258 OP 636: Performed by: SURGERY

## 2023-05-26 PROCEDURE — 258N000003 HC RX IP 258 OP 636: Performed by: STUDENT IN AN ORGANIZED HEALTH CARE EDUCATION/TRAINING PROGRAM

## 2023-05-26 PROCEDURE — 250N000013 HC RX MED GY IP 250 OP 250 PS 637

## 2023-05-26 PROCEDURE — 250N000011 HC RX IP 250 OP 636: Performed by: SURGERY

## 2023-05-26 PROCEDURE — 710N000009 HC RECOVERY PHASE 1, LEVEL 1, PER MIN: Performed by: SURGERY

## 2023-05-26 PROCEDURE — 36415 COLL VENOUS BLD VENIPUNCTURE: CPT | Performed by: SURGERY

## 2023-05-26 PROCEDURE — 250N000025 HC SEVOFLURANE, PER MIN: Performed by: SURGERY

## 2023-05-26 PROCEDURE — 999N000141 HC STATISTIC PRE-PROCEDURE NURSING ASSESSMENT: Performed by: SURGERY

## 2023-05-26 PROCEDURE — 93010 ELECTROCARDIOGRAM REPORT: CPT | Performed by: INTERNAL MEDICINE

## 2023-05-26 PROCEDURE — C1762 CONN TISS, HUMAN(INC FASCIA): HCPCS | Performed by: SURGERY

## 2023-05-26 PROCEDURE — 35876 REMOVAL OF CLOT IN GRAFT: CPT | Mod: GC | Performed by: SURGERY

## 2023-05-26 PROCEDURE — 86901 BLOOD TYPING SEROLOGIC RH(D): CPT | Performed by: SURGERY

## 2023-05-26 PROCEDURE — P9045 ALBUMIN (HUMAN), 5%, 250 ML: HCPCS | Performed by: NURSE ANESTHETIST, CERTIFIED REGISTERED

## 2023-05-26 PROCEDURE — 250N000009 HC RX 250: Performed by: SURGERY

## 2023-05-26 PROCEDURE — 93005 ELECTROCARDIOGRAM TRACING: CPT

## 2023-05-26 PROCEDURE — 258N000001 HC RX 258: Performed by: SURGERY

## 2023-05-26 PROCEDURE — 250N000013 HC RX MED GY IP 250 OP 250 PS 637: Performed by: STUDENT IN AN ORGANIZED HEALTH CARE EDUCATION/TRAINING PROGRAM

## 2023-05-26 PROCEDURE — 370N000017 HC ANESTHESIA TECHNICAL FEE, PER MIN: Performed by: SURGERY

## 2023-05-26 PROCEDURE — 120N000001 HC R&B MED SURG/OB

## 2023-05-26 PROCEDURE — 258N000003 HC RX IP 258 OP 636: Performed by: NURSE ANESTHETIST, CERTIFIED REGISTERED

## 2023-05-26 PROCEDURE — 272N000001 HC OR GENERAL SUPPLY STERILE: Performed by: SURGERY

## 2023-05-26 PROCEDURE — 360N000075 HC SURGERY LEVEL 2, PER MIN: Performed by: SURGERY

## 2023-05-26 PROCEDURE — 250N000009 HC RX 250: Performed by: NURSE ANESTHETIST, CERTIFIED REGISTERED

## 2023-05-26 DEVICE — GRAFT TISS 4MM-5MM DIA 21-29 CM FEM POPLITEAL ART STRL R020: Type: IMPLANTABLE DEVICE | Site: GROIN | Status: FUNCTIONAL

## 2023-05-26 RX ORDER — SODIUM CHLORIDE, SODIUM LACTATE, POTASSIUM CHLORIDE, CALCIUM CHLORIDE 600; 310; 30; 20 MG/100ML; MG/100ML; MG/100ML; MG/100ML
INJECTION, SOLUTION INTRAVENOUS CONTINUOUS PRN
Status: DISCONTINUED | OUTPATIENT
Start: 2023-05-26 | End: 2023-05-26

## 2023-05-26 RX ORDER — CLINDAMYCIN PHOSPHATE 900 MG/50ML
900 INJECTION, SOLUTION INTRAVENOUS EVERY 8 HOURS
Status: COMPLETED | OUTPATIENT
Start: 2023-05-26 | End: 2023-05-26

## 2023-05-26 RX ORDER — HEPARIN SODIUM 1000 [USP'U]/ML
INJECTION, SOLUTION INTRAVENOUS; SUBCUTANEOUS PRN
Status: DISCONTINUED | OUTPATIENT
Start: 2023-05-26 | End: 2023-05-26 | Stop reason: HOSPADM

## 2023-05-26 RX ORDER — ACETAMINOPHEN 500 MG
500-1000 TABLET ORAL EVERY 6 HOURS PRN
Status: DISCONTINUED | OUTPATIENT
Start: 2023-05-26 | End: 2023-05-27 | Stop reason: HOSPADM

## 2023-05-26 RX ORDER — ONDANSETRON 2 MG/ML
4 INJECTION INTRAMUSCULAR; INTRAVENOUS EVERY 6 HOURS PRN
Status: DISCONTINUED | OUTPATIENT
Start: 2023-05-26 | End: 2023-05-27 | Stop reason: HOSPADM

## 2023-05-26 RX ORDER — BUPIVACAINE HYDROCHLORIDE 5 MG/ML
INJECTION, SOLUTION EPIDURAL; INTRACAUDAL
Status: DISCONTINUED
Start: 2023-05-26 | End: 2023-05-26 | Stop reason: HOSPADM

## 2023-05-26 RX ORDER — FENTANYL CITRATE 50 UG/ML
50 INJECTION, SOLUTION INTRAMUSCULAR; INTRAVENOUS EVERY 5 MIN PRN
Status: DISCONTINUED | OUTPATIENT
Start: 2023-05-26 | End: 2023-05-26 | Stop reason: HOSPADM

## 2023-05-26 RX ORDER — LISINOPRIL 20 MG/1
20 TABLET ORAL 2 TIMES DAILY
Status: DISCONTINUED | OUTPATIENT
Start: 2023-05-26 | End: 2023-05-27 | Stop reason: HOSPADM

## 2023-05-26 RX ORDER — ASPIRIN 81 MG/1
81 TABLET, CHEWABLE ORAL DAILY
Status: DISCONTINUED | OUTPATIENT
Start: 2023-05-27 | End: 2023-05-27 | Stop reason: HOSPADM

## 2023-05-26 RX ORDER — ASPIRIN 81 MG/1
81 TABLET ORAL DAILY
Status: DISCONTINUED | OUTPATIENT
Start: 2023-05-27 | End: 2023-05-26 | Stop reason: ALTCHOICE

## 2023-05-26 RX ORDER — PROPOFOL 10 MG/ML
INJECTION, EMULSION INTRAVENOUS PRN
Status: DISCONTINUED | OUTPATIENT
Start: 2023-05-26 | End: 2023-05-26

## 2023-05-26 RX ORDER — ROSUVASTATIN CALCIUM 20 MG/1
40 TABLET, COATED ORAL AT BEDTIME
Status: DISCONTINUED | OUTPATIENT
Start: 2023-05-26 | End: 2023-05-27 | Stop reason: HOSPADM

## 2023-05-26 RX ORDER — SODIUM CHLORIDE 9 MG/ML
INJECTION, SOLUTION INTRAVENOUS CONTINUOUS
Status: DISCONTINUED | OUTPATIENT
Start: 2023-05-26 | End: 2023-05-27

## 2023-05-26 RX ORDER — HEPARIN SODIUM 5000 [USP'U]/.5ML
5000 INJECTION, SOLUTION INTRAVENOUS; SUBCUTANEOUS EVERY 8 HOURS
Status: DISCONTINUED | OUTPATIENT
Start: 2023-05-26 | End: 2023-05-27 | Stop reason: HOSPADM

## 2023-05-26 RX ORDER — PROPOFOL 10 MG/ML
INJECTION, EMULSION INTRAVENOUS CONTINUOUS PRN
Status: DISCONTINUED | OUTPATIENT
Start: 2023-05-26 | End: 2023-05-26

## 2023-05-26 RX ORDER — HYDROMORPHONE HCL IN WATER/PF 6 MG/30 ML
0.2 PATIENT CONTROLLED ANALGESIA SYRINGE INTRAVENOUS EVERY 5 MIN PRN
Status: DISCONTINUED | OUTPATIENT
Start: 2023-05-26 | End: 2023-05-26 | Stop reason: HOSPADM

## 2023-05-26 RX ORDER — LIDOCAINE HYDROCHLORIDE 10 MG/ML
INJECTION, SOLUTION INFILTRATION; PERINEURAL
Status: DISCONTINUED
Start: 2023-05-26 | End: 2023-05-26 | Stop reason: HOSPADM

## 2023-05-26 RX ORDER — NEOSTIGMINE METHYLSULFATE 1 MG/ML
VIAL (ML) INJECTION PRN
Status: DISCONTINUED | OUTPATIENT
Start: 2023-05-26 | End: 2023-05-26

## 2023-05-26 RX ORDER — LIDOCAINE 40 MG/G
CREAM TOPICAL
Status: DISCONTINUED | OUTPATIENT
Start: 2023-05-26 | End: 2023-05-27 | Stop reason: HOSPADM

## 2023-05-26 RX ORDER — GABAPENTIN 100 MG/1
100 CAPSULE ORAL 3 TIMES DAILY
Status: DISCONTINUED | OUTPATIENT
Start: 2023-05-26 | End: 2023-05-27 | Stop reason: HOSPADM

## 2023-05-26 RX ORDER — ONDANSETRON 2 MG/ML
INJECTION INTRAMUSCULAR; INTRAVENOUS PRN
Status: DISCONTINUED | OUTPATIENT
Start: 2023-05-26 | End: 2023-05-26

## 2023-05-26 RX ORDER — AMLODIPINE BESYLATE 5 MG/1
5 TABLET ORAL 2 TIMES DAILY
Status: DISCONTINUED | OUTPATIENT
Start: 2023-05-26 | End: 2023-05-27 | Stop reason: HOSPADM

## 2023-05-26 RX ORDER — POLYETHYLENE GLYCOL 3350 17 G/17G
17 POWDER, FOR SOLUTION ORAL DAILY
Status: DISCONTINUED | OUTPATIENT
Start: 2023-05-27 | End: 2023-05-27 | Stop reason: HOSPADM

## 2023-05-26 RX ORDER — FENTANYL CITRATE 50 UG/ML
INJECTION, SOLUTION INTRAMUSCULAR; INTRAVENOUS PRN
Status: DISCONTINUED | OUTPATIENT
Start: 2023-05-26 | End: 2023-05-26

## 2023-05-26 RX ORDER — FLUTICASONE FUROATE AND VILANTEROL 200; 25 UG/1; UG/1
1 POWDER RESPIRATORY (INHALATION) DAILY
Status: DISCONTINUED | OUTPATIENT
Start: 2023-05-26 | End: 2023-05-27 | Stop reason: HOSPADM

## 2023-05-26 RX ORDER — CLINDAMYCIN PHOSPHATE 900 MG/50ML
900 INJECTION, SOLUTION INTRAVENOUS SEE ADMIN INSTRUCTIONS
Status: DISCONTINUED | OUTPATIENT
Start: 2023-05-26 | End: 2023-05-26 | Stop reason: HOSPADM

## 2023-05-26 RX ORDER — PROCHLORPERAZINE MALEATE 10 MG
10 TABLET ORAL EVERY 6 HOURS PRN
Status: DISCONTINUED | OUTPATIENT
Start: 2023-05-26 | End: 2023-05-26

## 2023-05-26 RX ORDER — ONDANSETRON 2 MG/ML
4 INJECTION INTRAMUSCULAR; INTRAVENOUS EVERY 30 MIN PRN
Status: DISCONTINUED | OUTPATIENT
Start: 2023-05-26 | End: 2023-05-26 | Stop reason: HOSPADM

## 2023-05-26 RX ORDER — LIDOCAINE HYDROCHLORIDE 20 MG/ML
INJECTION, SOLUTION INFILTRATION; PERINEURAL PRN
Status: DISCONTINUED | OUTPATIENT
Start: 2023-05-26 | End: 2023-05-26

## 2023-05-26 RX ORDER — NICOTINE 21 MG/24HR
1 PATCH, TRANSDERMAL 24 HOURS TRANSDERMAL DAILY
Status: DISCONTINUED | OUTPATIENT
Start: 2023-05-26 | End: 2023-05-27 | Stop reason: HOSPADM

## 2023-05-26 RX ORDER — DEXAMETHASONE SODIUM PHOSPHATE 4 MG/ML
INJECTION, SOLUTION INTRA-ARTICULAR; INTRALESIONAL; INTRAMUSCULAR; INTRAVENOUS; SOFT TISSUE PRN
Status: DISCONTINUED | OUTPATIENT
Start: 2023-05-26 | End: 2023-05-26

## 2023-05-26 RX ORDER — SODIUM CHLORIDE, SODIUM LACTATE, POTASSIUM CHLORIDE, CALCIUM CHLORIDE 600; 310; 30; 20 MG/100ML; MG/100ML; MG/100ML; MG/100ML
INJECTION, SOLUTION INTRAVENOUS CONTINUOUS
Status: DISCONTINUED | OUTPATIENT
Start: 2023-05-26 | End: 2023-05-26 | Stop reason: HOSPADM

## 2023-05-26 RX ORDER — ONDANSETRON 4 MG/1
4 TABLET, ORALLY DISINTEGRATING ORAL EVERY 30 MIN PRN
Status: DISCONTINUED | OUTPATIENT
Start: 2023-05-26 | End: 2023-05-26 | Stop reason: HOSPADM

## 2023-05-26 RX ORDER — BUPIVACAINE HYDROCHLORIDE 5 MG/ML
INJECTION, SOLUTION PERINEURAL PRN
Status: DISCONTINUED | OUTPATIENT
Start: 2023-05-26 | End: 2023-05-26 | Stop reason: HOSPADM

## 2023-05-26 RX ORDER — NALOXONE HYDROCHLORIDE 0.4 MG/ML
0.4 INJECTION, SOLUTION INTRAMUSCULAR; INTRAVENOUS; SUBCUTANEOUS
Status: DISCONTINUED | OUTPATIENT
Start: 2023-05-26 | End: 2023-05-27 | Stop reason: HOSPADM

## 2023-05-26 RX ORDER — NALOXONE HYDROCHLORIDE 0.4 MG/ML
0.2 INJECTION, SOLUTION INTRAMUSCULAR; INTRAVENOUS; SUBCUTANEOUS
Status: DISCONTINUED | OUTPATIENT
Start: 2023-05-26 | End: 2023-05-27 | Stop reason: HOSPADM

## 2023-05-26 RX ORDER — CLOPIDOGREL BISULFATE 75 MG/1
75 TABLET ORAL DAILY
Status: DISCONTINUED | OUTPATIENT
Start: 2023-05-26 | End: 2023-05-27 | Stop reason: HOSPADM

## 2023-05-26 RX ORDER — OXYCODONE HYDROCHLORIDE 5 MG/1
5 TABLET ORAL EVERY 4 HOURS PRN
Status: DISCONTINUED | OUTPATIENT
Start: 2023-05-26 | End: 2023-05-27 | Stop reason: HOSPADM

## 2023-05-26 RX ORDER — HEPARIN SODIUM 1000 [USP'U]/ML
INJECTION, SOLUTION INTRAVENOUS; SUBCUTANEOUS PRN
Status: DISCONTINUED | OUTPATIENT
Start: 2023-05-26 | End: 2023-05-26

## 2023-05-26 RX ORDER — BISACODYL 10 MG
10 SUPPOSITORY, RECTAL RECTAL DAILY PRN
Status: DISCONTINUED | OUTPATIENT
Start: 2023-05-26 | End: 2023-05-27 | Stop reason: HOSPADM

## 2023-05-26 RX ORDER — HEPARIN SODIUM 1000 [USP'U]/ML
INJECTION, SOLUTION INTRAVENOUS; SUBCUTANEOUS
Status: DISCONTINUED
Start: 2023-05-26 | End: 2023-05-26 | Stop reason: HOSPADM

## 2023-05-26 RX ORDER — OXYCODONE HYDROCHLORIDE 5 MG/1
10 TABLET ORAL EVERY 4 HOURS PRN
Status: DISCONTINUED | OUTPATIENT
Start: 2023-05-26 | End: 2023-05-27 | Stop reason: HOSPADM

## 2023-05-26 RX ORDER — CARVEDILOL 6.25 MG/1
6.25 TABLET ORAL 2 TIMES DAILY WITH MEALS
Status: DISCONTINUED | OUTPATIENT
Start: 2023-05-26 | End: 2023-05-27 | Stop reason: HOSPADM

## 2023-05-26 RX ORDER — HYDROMORPHONE HCL IN WATER/PF 6 MG/30 ML
0.4 PATIENT CONTROLLED ANALGESIA SYRINGE INTRAVENOUS EVERY 5 MIN PRN
Status: DISCONTINUED | OUTPATIENT
Start: 2023-05-26 | End: 2023-05-26 | Stop reason: HOSPADM

## 2023-05-26 RX ORDER — LABETALOL 20 MG/4 ML (5 MG/ML) INTRAVENOUS SYRINGE
PRN
Status: DISCONTINUED | OUTPATIENT
Start: 2023-05-26 | End: 2023-05-26

## 2023-05-26 RX ORDER — AMOXICILLIN 250 MG
1 CAPSULE ORAL 2 TIMES DAILY
Status: DISCONTINUED | OUTPATIENT
Start: 2023-05-26 | End: 2023-05-27 | Stop reason: HOSPADM

## 2023-05-26 RX ORDER — CLINDAMYCIN PHOSPHATE 900 MG/50ML
900 INJECTION, SOLUTION INTRAVENOUS
Status: COMPLETED | OUTPATIENT
Start: 2023-05-26 | End: 2023-05-26

## 2023-05-26 RX ORDER — FENTANYL CITRATE 50 UG/ML
25 INJECTION, SOLUTION INTRAMUSCULAR; INTRAVENOUS EVERY 5 MIN PRN
Status: DISCONTINUED | OUTPATIENT
Start: 2023-05-26 | End: 2023-05-26 | Stop reason: HOSPADM

## 2023-05-26 RX ORDER — ONDANSETRON 4 MG/1
4 TABLET, ORALLY DISINTEGRATING ORAL EVERY 6 HOURS PRN
Status: DISCONTINUED | OUTPATIENT
Start: 2023-05-26 | End: 2023-05-27 | Stop reason: HOSPADM

## 2023-05-26 RX ORDER — GLYCOPYRROLATE 0.2 MG/ML
INJECTION, SOLUTION INTRAMUSCULAR; INTRAVENOUS PRN
Status: DISCONTINUED | OUTPATIENT
Start: 2023-05-26 | End: 2023-05-26

## 2023-05-26 RX ADMIN — OXYCODONE HYDROCHLORIDE 10 MG: 5 TABLET ORAL at 20:06

## 2023-05-26 RX ADMIN — PHENYLEPHRINE HYDROCHLORIDE 0.3 MCG/KG/MIN: 10 INJECTION INTRAVENOUS at 08:09

## 2023-05-26 RX ADMIN — PHENYLEPHRINE HYDROCHLORIDE 100 MCG: 10 INJECTION INTRAVENOUS at 08:40

## 2023-05-26 RX ADMIN — FENTANYL CITRATE 50 MCG: 50 INJECTION, SOLUTION INTRAMUSCULAR; INTRAVENOUS at 09:00

## 2023-05-26 RX ADMIN — CARVEDILOL 6.25 MG: 6.25 TABLET, FILM COATED ORAL at 18:05

## 2023-05-26 RX ADMIN — PHENYLEPHRINE HYDROCHLORIDE 100 MCG: 10 INJECTION INTRAVENOUS at 08:24

## 2023-05-26 RX ADMIN — GLYCOPYRROLATE 0.2 MG: 0.2 INJECTION, SOLUTION INTRAMUSCULAR; INTRAVENOUS at 09:18

## 2023-05-26 RX ADMIN — HEPARIN SODIUM 5000 UNITS: 5000 INJECTION, SOLUTION INTRAVENOUS; SUBCUTANEOUS at 22:11

## 2023-05-26 RX ADMIN — SODIUM CHLORIDE, POTASSIUM CHLORIDE, SODIUM LACTATE AND CALCIUM CHLORIDE: 600; 310; 30; 20 INJECTION, SOLUTION INTRAVENOUS at 06:59

## 2023-05-26 RX ADMIN — ROCURONIUM BROMIDE 50 MG: 50 INJECTION, SOLUTION INTRAVENOUS at 07:44

## 2023-05-26 RX ADMIN — FENTANYL CITRATE 50 MCG: 50 INJECTION, SOLUTION INTRAMUSCULAR; INTRAVENOUS at 08:03

## 2023-05-26 RX ADMIN — FENTANYL CITRATE 25 MCG: 50 INJECTION, SOLUTION INTRAMUSCULAR; INTRAVENOUS at 09:26

## 2023-05-26 RX ADMIN — PHENYLEPHRINE HYDROCHLORIDE 50 MCG: 10 INJECTION INTRAVENOUS at 09:07

## 2023-05-26 RX ADMIN — PHENYLEPHRINE HYDROCHLORIDE 100 MCG: 10 INJECTION INTRAVENOUS at 08:00

## 2023-05-26 RX ADMIN — PHENYLEPHRINE HYDROCHLORIDE 100 MCG: 10 INJECTION INTRAVENOUS at 07:44

## 2023-05-26 RX ADMIN — PROPOFOL 20 MCG/KG/MIN: 10 INJECTION, EMULSION INTRAVENOUS at 08:00

## 2023-05-26 RX ADMIN — PHENYLEPHRINE HYDROCHLORIDE 50 MCG: 10 INJECTION INTRAVENOUS at 07:50

## 2023-05-26 RX ADMIN — GLYCOPYRROLATE 0.2 MG: 0.2 INJECTION, SOLUTION INTRAMUSCULAR; INTRAVENOUS at 09:45

## 2023-05-26 RX ADMIN — PHENYLEPHRINE HYDROCHLORIDE 100 MCG: 10 INJECTION INTRAVENOUS at 09:13

## 2023-05-26 RX ADMIN — SODIUM CHLORIDE: 9 INJECTION, SOLUTION INTRAVENOUS at 12:41

## 2023-05-26 RX ADMIN — ALBUMIN HUMAN: 0.05 INJECTION, SOLUTION INTRAVENOUS at 08:18

## 2023-05-26 RX ADMIN — HEPARIN SODIUM 5000 UNITS: 1000 INJECTION INTRAVENOUS; SUBCUTANEOUS at 08:32

## 2023-05-26 RX ADMIN — CLINDAMYCIN PHOSPHATE 900 MG: 900 INJECTION, SOLUTION INTRAVENOUS at 07:00

## 2023-05-26 RX ADMIN — PROPOFOL 170 MG: 10 INJECTION, EMULSION INTRAVENOUS at 07:44

## 2023-05-26 RX ADMIN — PHENYLEPHRINE HYDROCHLORIDE 50 MCG: 10 INJECTION INTRAVENOUS at 08:27

## 2023-05-26 RX ADMIN — PROPOFOL 30 MG: 10 INJECTION, EMULSION INTRAVENOUS at 08:57

## 2023-05-26 RX ADMIN — OXYCODONE HYDROCHLORIDE 5 MG: 5 TABLET ORAL at 10:54

## 2023-05-26 RX ADMIN — DEXAMETHASONE SODIUM PHOSPHATE 4 MG: 4 INJECTION, SOLUTION INTRA-ARTICULAR; INTRALESIONAL; INTRAMUSCULAR; INTRAVENOUS; SOFT TISSUE at 07:44

## 2023-05-26 RX ADMIN — NICOTINE 1 PATCH: 14 PATCH, EXTENDED RELEASE TRANSDERMAL at 16:03

## 2023-05-26 RX ADMIN — ACETAMINOPHEN 1000 MG: 500 TABLET ORAL at 14:15

## 2023-05-26 RX ADMIN — FENTANYL CITRATE 25 MCG: 50 INJECTION, SOLUTION INTRAMUSCULAR; INTRAVENOUS at 10:21

## 2023-05-26 RX ADMIN — GABAPENTIN 100 MG: 100 CAPSULE ORAL at 20:06

## 2023-05-26 RX ADMIN — GABAPENTIN 100 MG: 100 CAPSULE ORAL at 14:04

## 2023-05-26 RX ADMIN — AMLODIPINE BESYLATE 5 MG: 5 TABLET ORAL at 20:06

## 2023-05-26 RX ADMIN — OXYCODONE HYDROCHLORIDE 10 MG: 5 TABLET ORAL at 14:58

## 2023-05-26 RX ADMIN — SENNOSIDES AND DOCUSATE SODIUM 1 TABLET: 50; 8.6 TABLET ORAL at 20:06

## 2023-05-26 RX ADMIN — ROSUVASTATIN CALCIUM 40 MG: 20 TABLET, FILM COATED ORAL at 22:11

## 2023-05-26 RX ADMIN — CLOPIDOGREL BISULFATE 75 MG: 75 TABLET ORAL at 14:04

## 2023-05-26 RX ADMIN — CLINDAMYCIN PHOSPHATE 900 MG: 900 INJECTION, SOLUTION INTRAVENOUS at 14:24

## 2023-05-26 RX ADMIN — PHENYLEPHRINE HYDROCHLORIDE 100 MCG: 10 INJECTION INTRAVENOUS at 08:08

## 2023-05-26 RX ADMIN — ONDANSETRON 4 MG: 2 INJECTION INTRAMUSCULAR; INTRAVENOUS at 09:38

## 2023-05-26 RX ADMIN — SODIUM CHLORIDE, POTASSIUM CHLORIDE, SODIUM LACTATE AND CALCIUM CHLORIDE: 600; 310; 30; 20 INJECTION, SOLUTION INTRAVENOUS at 08:34

## 2023-05-26 RX ADMIN — LISINOPRIL 20 MG: 20 TABLET ORAL at 20:06

## 2023-05-26 RX ADMIN — PHENYLEPHRINE HYDROCHLORIDE 100 MCG: 10 INJECTION INTRAVENOUS at 07:53

## 2023-05-26 RX ADMIN — MIDAZOLAM 1 MG: 1 INJECTION INTRAMUSCULAR; INTRAVENOUS at 07:30

## 2023-05-26 RX ADMIN — PHENYLEPHRINE HYDROCHLORIDE 50 MCG: 10 INJECTION INTRAVENOUS at 09:10

## 2023-05-26 RX ADMIN — LIDOCAINE HYDROCHLORIDE 50 MG: 20 INJECTION, SOLUTION INFILTRATION; PERINEURAL at 07:44

## 2023-05-26 RX ADMIN — SODIUM CHLORIDE, POTASSIUM CHLORIDE, SODIUM LACTATE AND CALCIUM CHLORIDE: 600; 310; 30; 20 INJECTION, SOLUTION INTRAVENOUS at 07:54

## 2023-05-26 RX ADMIN — FENTANYL CITRATE 50 MCG: 50 INJECTION, SOLUTION INTRAMUSCULAR; INTRAVENOUS at 07:44

## 2023-05-26 RX ADMIN — NEOSTIGMINE METHYLSULFATE 3 MG: 1 INJECTION, SOLUTION INTRAVENOUS at 09:45

## 2023-05-26 RX ADMIN — SENNOSIDES AND DOCUSATE SODIUM 1 TABLET: 50; 8.6 TABLET ORAL at 12:40

## 2023-05-26 RX ADMIN — LABETALOL 20 MG/4 ML (5 MG/ML) INTRAVENOUS SYRINGE 5 MG: at 09:54

## 2023-05-26 RX ADMIN — PHENYLEPHRINE HYDROCHLORIDE 100 MCG: 10 INJECTION INTRAVENOUS at 08:10

## 2023-05-26 RX ADMIN — CLINDAMYCIN PHOSPHATE 900 MG: 900 INJECTION, SOLUTION INTRAVENOUS at 22:11

## 2023-05-26 RX ADMIN — PHENYLEPHRINE HYDROCHLORIDE 100 MCG: 10 INJECTION INTRAVENOUS at 08:12

## 2023-05-26 RX ADMIN — PHENYLEPHRINE HYDROCHLORIDE 100 MCG: 10 INJECTION INTRAVENOUS at 07:55

## 2023-05-26 ASSESSMENT — ACTIVITIES OF DAILY LIVING (ADL)
ADLS_ACUITY_SCORE: 18
ADLS_ACUITY_SCORE: 22
ADLS_ACUITY_SCORE: 18
ADLS_ACUITY_SCORE: 22

## 2023-05-26 ASSESSMENT — LIFESTYLE VARIABLES: TOBACCO_USE: 1

## 2023-05-26 ASSESSMENT — COPD QUESTIONNAIRES: COPD: 1

## 2023-05-26 NOTE — ANESTHESIA CARE TRANSFER NOTE
Patient: Shirley Hendricks    Procedure: Procedure(s):  RIGHT DISTAL SUPERFICIAL FEMORAL GRAFT TO ANTERIOR TIBIAL ARTERY WITH 26 CENTIMETER CADAVERIC SUPERFICIAL FEMORAL ARTERY GRAFT       Diagnosis: Occlusion of femoropopliteal bypass graft (H) [T82.898A]  Diagnosis Additional Information: No value filed.    Anesthesia Type:   General     Note:    Oropharynx: oropharynx clear of all foreign objects and spontaneously breathing  Level of Consciousness: awake  Oxygen Supplementation: face mask  Level of Supplemental Oxygen (L/min / FiO2): 6  Independent Airway: airway patency satisfactory and stable  Dentition: dentition unchanged  Vital Signs Stable: post-procedure vital signs reviewed and stable  Report to RN Given: handoff report given  Patient transferred to: PACU    Handoff Report: Identifed the Patient, Identified the Reponsible Provider, Reviewed the pertinent medical history, Discussed the surgical course, Reviewed Intra-OP anesthesia mangement and issues during anesthesia, Set expectations for post-procedure period and Allowed opportunity for questions and acknowledgement of understanding      Vitals:  Vitals Value Taken Time   /105 05/26/23 1001   Temp     Pulse 61 05/26/23 1010   Resp 10 05/26/23 1010   SpO2 100 % 05/26/23 1010   Vitals shown include unvalidated device data.    Electronically Signed By: NOELLE Chavez CRNA  May 26, 2023  10:11 AM

## 2023-05-26 NOTE — PROGRESS NOTES
VASCULAR SURGERY: Postop check    Up on floor.  Very comfortable.  Sister is present.    +3 palpable pulse in right graft over tibia.  +3 DP pulse  Foot is warm and pink (improved with augmented blood supply)    +3 left femoral-popliteal in situ graft pulse.    IMPRESSION: Doing very well following outflow bypass to the anterior tibial artery.  Very small arteries good pulse and Doppler signals.    Reinitiate Plavix/aspirin.

## 2023-05-26 NOTE — BRIEF OP NOTE
[facility]    Brief Operative Note    Pre-operative diagnosis: Occlusion of femoropopliteal bypass graft (H) [T82.898A]  Post-operative diagnosis Same as pre-operative diagnosis    Procedure: Procedure(s):  RIGHT DISTAL SUPERFICIAL FEMORAL GRAFT TO ANTERIOR TIBIAL ARTERY WITH 26 CENTIMETER CADAVERIC SUPERFICIAL FEMORAL ARTERY GRAFT  Surgeon: Surgeon(s) and Role:     * Chadwick Lozano MD - Primary  Anesthesia: General   Estimated Blood Loss: 25 cc    Drains: None  Specimens: * No specimens in log *  Findings:   Biphasic DP at end of case .  Complications: None.  Implants:   Implant Name Type Inv. Item Serial No.  Lot No. LRB No. Used Action   GRAFT TISS 4MM-5MM JENY 21-29 CM FEM POPLITEAL ART STRL R020 - [id number] Bone/Tissue/Biologic GRAFT TISS 4MM-5MM JENY 21-29 CM FEM POPLITEAL ART STRL R020 [id number] [facility] [id number] Right 1 Implanted

## 2023-05-26 NOTE — OP NOTE
Procedure Date: 05/26/2023    PREOPERATIVE DIAGNOSIS:  Failed right distal graft to below-knee popliteal vein bypass graft.    POSTOPERATIVE DIAGNOSIS:  Failed right distal graft to below-knee popliteal vein bypass graft.    OPERATIVE PROCEDURE:  Right distal femoral -popliteal graft to midanterior tibial cadaveric SFA bypass.    SURGEON:  Chadwick Lozano MD    FIRST ASSISTANT:  Laura Wells MD, (Vascular Fellow).    ANESTHESIA:  General.    PREOPERATIVE MEDICATIONS:  Ancef 2 grams IV.    INDICATION:  A 62-year-old patient has had multiple vascular procedures.  This includes a right femoral to above knee popliteal cadaveric SFA graft.  The distal popliteal artery occluded and she had a jump graft from the original distal graft to the below knee popliteal artery that required revision and angioplasty.  This distal graft is now occluded.  The anterior tibial artery is patent beyond its origin, though there is a mild midstenosis.  We felt that a bypass to the midanterior tibial artery was indicated to improve the blood supply to her leg. With a very small size of her arteries and no autogenous veins, we felt a cadaveric graft would be most beneficial.  She comes to the operating room today under informed consent.    DESCRIPTION OF PROCEDURE:  The patient was brought to the operating room.  Induced under general anesthesia.  No Alvarez catheter was necessary.  Calf pneumatic compression boot was placed on the left leg with appropriate padding.  The entire right leg was prepped and draped.  Timeout was called and the sites were identified.     VASCULAR EXPOSURE:  We could easily palpate the distal portion of her original bypass graft above the knee.  Incision was made over this.  Dissection was carried down to identify the occluded vein jump graft and this was dissected free.  The inflow outflow of the now almost 8 mm revisional cadaveric SFA graft was encircled proximally and distally to this with Silastic vessel  loops.    We then made an incision in the right midanterior calf.  Dissection was carried to the fascia, which was split.  Muscle splitting incision was made to identify the anterior tibial artery.  This was a smaller artery measuring approximately 1.5 mm, but disease free.  All side branches were preserved.  Proximal and distal Silastic vessel loops were placed.    CADAVERIC BYPASS:  We used a 36 cm cadaveric SFA graft and this was prepped in the prescribed fashion.  When the graft was ready, the patient was given 5000 units of intravenous heparin.  Vessel loops were tightened around the thigh graft and we transected the original jump graft.  This was spatulated.  A very organized thrombus was removed and under loupe magnification we were able to remove all thrombus and intimal hyperplasia with a widely patent anastomosis.  We then spatulated our cadaveric graft in the normal direction of flow and spatulated a side-to-side anastomosis with the previous cephalic vein graft was performed with running 6-0 Prolene suture and loupe magnification.  With release of the vessel loops the cadaveric graft dilated very nicely.  We did place a mattress 6-0 Prolene suture  anastomosis for good hemostasis.  We kept a very low angle on our anastomosis to prevent any skin complications.  There was an excellent size match between this graft that measured approximately 7 mm.    We then created a subcutaneous tunnel from the midtibial area going anterior and medial over the tibial and then to our distal thigh incision.  The graft was brought through this in a very gentle curve with excellent pulsatile flow.  Vascular clamp was applied to the inflow, avoiding the new graft.  We had a very gentle curve and appropriate length of graft distally and this was transected obliquely.  Vessel loops were tightened around the anterior tibial artery and a 1.2 cm longitudinal arteriotomy was made in this very small, but disease-free artery.   End-to-side anastomosis with no tension was performed with running 7-0 Prolene suture and loupe magnification.  We released the vascular clamps. We had an excellent pulse within the graft and good Doppler signals in the outflow arteries.  There was an obvious size discrepancy with the graft due to the very small artery.  There was no tension on the anastomosis and a very gentle angle going down to this.    Wounds were infiltrated with 0.5% Marcaine for post-analgesia.  Subcutaneous tissue in the thigh and calf incision was closed with interrupted 3-0 Vicryl and the skin was closed with 4-0 Monocryl in subcuticular fashion along with several interrupted 4-0 Monocryls.  Surgical adhesive applied to both sites followed by Tegaderm dressings.    The patient tolerated the procedure well.    ESTIMATED BLOOD LOSS:  Less than 25 mL.    COMPLICATIONS:  None.    OPERATIVE FINDINGS:  A patent anterior tibial artery, but very small artery.  Size mismatch is expected between the cadaveric graft and the patient.    The patient will be kept on her chronic aspirin and Plavix.      +3 graft and DP pulse in PACU    Chadwick Lozano MD        D: 2023   T: 2023   MT: juan jose    Name:     KASIA WAN  MRN:      [id number]        Account:        [id number]   :      1960           Procedure Date: 2023     Document: [id number]

## 2023-05-26 NOTE — ANESTHESIA POSTPROCEDURE EVALUATION
Patient: Shirley Hendricks    Procedure: Procedure(s):  RIGHT DISTAL SUPERFICIAL FEMORAL GRAFT TO ANTERIOR TIBIAL ARTERY WITH 26 CENTIMETER CADAVERIC SUPERFICIAL FEMORAL ARTERY GRAFT       Anesthesia Type:  General    Note:  Disposition: Inpatient   Postop Pain Control: Uneventful            Sign Out: Well controlled pain   PONV: No   Neuro/Psych: Uneventful            Sign Out: Acceptable/Baseline neuro status   Airway/Respiratory: Uneventful            Sign Out: Acceptable/Baseline resp. status   CV/Hemodynamics: Uneventful            Sign Out: Acceptable CV status   Other NRE: NONE   DID A NON-ROUTINE EVENT OCCUR? No           Last vitals:  Vitals Value Taken Time   /60 05/26/23 1120   Temp 36.5  C (97.7  F) 05/26/23 1001   Pulse 55 05/26/23 1130   Resp 8 05/26/23 1130   SpO2 96 % 05/26/23 1130   Vitals shown include unvalidated device data.    Electronically Signed By: Jimbo Wells MD  May 26, 2023  1:12 PM

## 2023-05-26 NOTE — PLAN OF CARE
Goal Outcome Evaluation:         Shift: 5/26/2023 12:00-19:30    Summary: POD #0 for femoropopliteal bypass on right leg. CMS intact with bounding pulse in right dorsalis pedis    Orientation; A&O x4    Vitals/Tele: VSS on RA; EtCO2 35-37    IV Access/drains: PIV left hand infusing NS @ 70 ml/hr; PIV right forearm SL    Diet; Regular -- tolerating well    Mobility: SBA/Ax1 gb and IV pole to bathroom    GI/: Continent bowel and bladder -- voided 400 ml this shift. Last BM 5/25 per patient.     Wound/Skin: surgical adhesive dressing and Tegaderm dressings CDI on right leg; new skin tear upon arrival from surgery on left forearm; sacral Mepilex in place    Consults: Vascular Surgery following    Discharge Plan: pending      See Flow sheets for assessment

## 2023-05-26 NOTE — OR NURSING
After fent ivp bp decreased. Advised pt we could do oral pain meds b ut no more iv pain meds at this time. She said thast was ok.

## 2023-05-26 NOTE — ANESTHESIA PROCEDURE NOTES
Airway       Patient location during procedure: OR       Procedure Start/Stop Times: 5/26/2023 7:46 AM  Staff -        Anesthesiologist:  New Noyola MD       CRNA: Lily Schneider APRN CRNA       Other Anesthesia Staff: Fallon Lan       Performed By: NADER and with CRNAs       Procedure performed by resident/fellow/CRNA in presence of a teaching physician.    Consent for Airway        Urgency: elective  Indications and Patient Condition       Indications for airway management: lydia-procedural       Induction type:intravenous       Mask difficulty assessment: 1 - vent by mask    Final Airway Details       Final airway type: endotracheal airway       Successful airway: ETT - single  Endotracheal Airway Details        ETT size (mm): 7.0       Cuffed: yes       Cuff volume (mL): 7       Successful intubation technique: direct laryngoscopy       DL Blade Type: MAC 3       Grade View of Cords: 1       Adjucts: stylet       Position: Right       Measured from: gums/teeth       Secured at (cm): 21       Bite block used: None    Post intubation assessment        Placement verified by: capnometry, equal breath sounds and chest rise        Number of attempts at approach: 1       Number of other approaches attempted: 0       Secured with: pink tape       Ease of procedure: easy       Dentition: Unchanged and Intact    Medication(s) Administered   Medication Administration Time: 5/26/2023 7:46 AM

## 2023-05-26 NOTE — ANESTHESIA PREPROCEDURE EVALUATION
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 Simons dermatology     Embolism and thrombosis of unspecified artery (H) 2000    Protein C,S, Leiden FV, Lupus Inhibitor Negative     Gastroesophageal reflux disease      Hyperlipidaemia      Hypertension      Lupus (H)     skin     Mild major depression (H) 2017     Myocardial infarction (H)     x3     Osteoarthrosis, unspecified whether generalized or localized, unspecified site      PAD (peripheral artery disease) (H)      Peripheral vascular disease, unspecified (H) 2000    s/p angioplasty with stent right femoral a.; Right iliac and femoral a. clot     Post-menopausal      Reflux esophagitis 2004    EGD: esophagitis and medium HH     SBO (small bowel obstruction) (H) 08/10/2021     SVT (supraventricular tachycardia) (H)      Thrombocytopenia (H)      Uncomplicated asthma      Vitamin C deficiency 2018      Past Surgical History:   Procedure Laterality Date     ANGIOGRAM       ANGIOGRAM Right 2021    Procedure: RIGHT LOWER EXTREMITY ANGIOGRAM WITH LEFT BRACHIAL ARTERY CUTDOWN;  Surgeon: José Luis Hernandez MD;  Location:  OR     BYPASS GRAFT FEMOROPOPLITEAL Right 2020    Procedure: RIGHT FEMORAL GRAFT TO ABOVE-KNEE POPLITEAL BYPASS USING CADAVERIC SUPERFICIAL FEMORAL ARTERY;  Surgeon: Chadwick Lozano MD;  Location:  OR     BYPASS GRAFT FEMOROPOPLITEAL Right 2/15/2022    Procedure: RIGHT SUPERFICIAL FEMORAL ARTERY GRAFT TO BELOW  KNEE POPLITEAL BYPASS WITH CADAVERIC CRYOLIFE  FEMORAL-POPLITEAL ARTERY SIZE: OUTER DIAMETER: 7MM - 6MM;  Surgeon: Chadwick Lozano;  Location:  OR     BYPASS GRAFT INSITU FEMOROPOPLITEAL Right 7/7/2021    Procedure: CREATION RIGHT FEMORAL TO POPLITEAL ARTERIAL BYPASS USING INSITU VEIN GRAFT;  Surgeon: José Luis Hernandez MD;  Location:  OR     CARDIAC CATHERIZATION  09/03/2009    multivessel CAD without target lesions, med mgmt indicated, preserved ef     CARPAL TUNNEL RELEASE RT/LT Right 05/20/2021     ENDARTERECTOMY CAROTID Right 05/11/2016    Procedure: ENDARTERECTOMY CAROTID;  Surgeon: Chadwick Lozano MD;  Location:  OR     ENDARTERECTOMY CAROTID Left 06/08/2020    Procedure: LEFT CAROTID ENDARTERECTOMY with distal facal vein patch  and EEG;  Surgeon: Chadwick Loazno MD;  Location:  OR     FLUORESCENCE INTRAOPERATIVE VASCULAR ANGIOGRAPHY Right 12/28/2022    Procedure: RIGHT LEG ANGIOGRAM with intervention;  Surgeon: Chadwick Lozano MD;  Location:  OR     GYN SURGERY  left tube    left salpingectomy     IR LOWER EXTREMITY ANGIOGRAM RIGHT  05/25/2021     IR OR ANGIOGRAM  6/23/2021     IR OR ANGIOGRAM  12/28/2022     LAPAROSCOPIC CHOLECYSTECTOMY N/A 09/27/2017    Procedure: LAPAROSCOPIC CHOLECYSTECTOMY;  LAPAROSCOPIC CHOLECYSTECTOMY;  Surgeon: Jacoby Tapia MD;  Location: [facility]     LAPAROSCOPY DIAGNOSTIC (GENERAL) N/A 8/11/2021    Procedure: Exploratory laparoscopy,  laparoscopic lysis of adhesions, laparotomy;  Surgeon: Corina Ferreira MD;  Location:  OR     ORTHOPEDIC SURGERY      left knee surgery     REPAIR ANEURYSM FEMORAL ARTERY Left 12/28/2022    Procedure: REPAIR LEFT FEMORAL PSEUDOANEURYSM;  Surgeon: Chadwick Lozano MD;  Location:  OR     VASCULAR SURGERY  aoto bi fem  left fem-AK pop     ZZC FABRIC WRAPPING OF ABDOMINAL ANEURYSM       ZZC NONSPECIFIC PROCEDURE  12/2000    angioplasty with stent right fem. a.     ZZC NONSPECIFIC PROCEDURE   1987    sinus surgery     [facility] NONSPECIFIC PROCEDURE  2009    Emergent left groin exploration with oversewing of bleeding angiographic site. 2. Endarterectomy of common femoral-proximal superficial femoral artery with greater saphenous vein patch angioplasty.   a. [place] of accessory right greater saphenous vein.      [facility] NONSPECIFIC PROCEDURE  2009    occluded left common iliac and external iliac arteries were successfully revascularized with stenting to 8 and 7 mm       Allergies   Allergen Reactions     Contrast Dye Anaphylaxis     RASH, FACIAL AND NECK SWELLING, SOB, WHEEZING     Pantoprazole      Protonix caused diffuse edema     Chantix [Varenicline]      Terrible dreams     Penicillins Itching      Social History     Tobacco Use     Smoking status: Every Day     Packs/day: 0.50     Years: 52.00     Pack years: 26.00     Types: Cigarettes     Last attempt to quit: 8/3/2021     Years since quittin.8     Smokeless tobacco: Never     Tobacco comments:     1/2 PPD   Vaping Use     Vaping status: Never Used   Substance Use Topics     Alcohol use: Not Currently     Comment: x1-2 yr      Wt Readings from Last 1 Encounters:   23 52.1 kg (114 lb 12.8 oz)        Anesthesia Evaluation   Pt has had prior anesthetic. Type: General.    No history of anesthetic complications       ROS/MED HX  ENT/Pulmonary:     (+) allergic rhinitis, tobacco use, Current use, asthma COPD,  (-) sleep apnea   Neurologic:       Cardiovascular: Comment: Multiple femoral bypass grafts  Hx of bilateral carotid endarterectomies  Hx of SVT    Name: KASIA WAN  MRN: [id number]  : 1960  Study Date: 2021 12:57 PM  Age: 60 yrs  Gender: Female  Patient Location: [facility]  Reason For Study: Light headedness  Ordering Physician: JACKIE DOWNEY  Referring Physician: Vasquez Benoit  Performed By: Corey James RDCS     BSA: 1.5 m2  Height: 62 in  Weight: 112 lb  HR: 57  BP: 140/78  mmHg  ______________________________________________________________________________  Procedure  Stress Echo Complete.     ______________________________________________________________________________  Interpretation Summary     Suboptimal stress test due to inadequate maximum heart rate.  Normal left ventricular function and wall motion at rest and post-stress but  LV size and function were unchanged following limited exercise of 3 minutes.  Post-exercise images were obtained with the heart rate in the 70's bpm.  Blood pressure seth appropriately from 140/78 mmHg (supine, baseline) to  158/80 mmHg during Stage I and fell to 134/74 mmHg in recovery. Consider  Lexiscan stress imaging (nuclear, MRI) for adequate testing.  ______________________________________________________________________________  Stress  Exercise was stopped due to fatigue.  There was a normal BP response to exercise.  Target Heart Rate was not achieved due to fatigue.  Suboptimal stress test due to inadequate maximum heart rate.  J point/ST elevation at rest pseudonormalized with exercise.  There was no arrhythmia.  The Duke treadmill score was intermediate risk ( -11< Sterling score <5).  Normal left ventricular function and wall motion at rest and post-stress.  The visual ejection fraction is estimated at 60-65%.     Baseline  The patient is in normal sinus rhythm.  Baseline ECG displays Q waves in the mike-septal leads.  Elevated J point V3-V6, inferior leads - most C/W early repolarization.  Normal left ventricular function and wall motion at rest and post-stress.  The visual ejection fraction is estimated at 60-65%.     Stress Results         Protocol:  Eliezer Protocol        Maximum Predicted HR:   158 bpm         Target HR: 134 bpm               % Maximum Predicted HR: 66 %        Stage  DurationHeart Rate  BP                    Comment             (mm:ss)   (bpm)    Stage 1   3:00      104   158/80Patient requested to stop   RecoveryR  4:00       56    134/74RPP = 43607, Sterling = 2, FAC = Below average             Stress Duration:   3:00 mm:ss        Recovery Time: 4:00 mm:ss          Maximum Stress HR: 104 bpm           METS:          4     Mitral Valve  There is no mitral regurgitation noted.     Tricuspid Valve  No tricuspid regurgitation.     Aortic Valve  No aortic regurgitation is present. No hemodynamically significant valvular  aortic stenosis.  ______________________________________________________________________________  MMode/2D Measurements & Calculations  IVSd: 1.00 cm     LVIDd: 4.0 cm  LVIDs: 2.8 cm  LVPWd: 0.73 cm  FS: 31.8 %  LV mass(C)dI: 69.9 grams/m2  asc Aorta Diam: 2.6 cm  RWT: 0.36    (+) Dyslipidemia hypertension--CAD -past MI --Previous cardiac testing   Echo: Date: Results:    Stress Test: Date:  Results:  Name: KASIA WAN  MRN: [id number]  : 1960  Study Date: 2021 12:57 PM  Age: 60 yrs  Gender: Female  Patient [facility]: [facility]  Reason For Study: Light headedness  Ordering Physician: JACKIE DOWNEY  Referring Physician: Vasquez Benoit  Performed By: Corey James RDCS     BSA: 1.5 m2  Height: 62 in  Weight: 112 lb  HR: 57  BP: 140/78 mmHg  ______________________________________________________________________________  Procedure  Stress Echo Complete.     ______________________________________________________________________________  Interpretation Summary     Suboptimal stress test due to inadequate maximum heart rate.  Normal left ventricular function and wall motion at rest and post-stress but  LV size and function were unchanged following limited exercise of 3 minutes.  Post-exercise images were obtained with the heart rate in the 70's bpm.  Blood pressure seth appropriately from 140/78 mmHg (supine, baseline) to  158/80 mmHg during Stage I and fell to 134/74 mmHg in recovery. Consider  Lexiscan stress imaging (nuclear, MRI) for adequate  testing.  ______________________________________________________________________________  Stress  Exercise was stopped due to fatigue.  There was a normal BP response to exercise.  Target Heart Rate was not achieved due to fatigue.  Suboptimal stress test due to inadequate maximum heart rate.  J point/ST elevation at rest pseudonormalized with exercise.  There was no arrhythmia.  The Duke treadmill score was intermediate risk ( -11< Sterling score <5).  Normal left ventricular function and wall motion at rest and post-stress.  The visual ejection fraction is estimated at 60-65%.     Baseline  The patient is in normal sinus rhythm.  Baseline ECG displays Q waves in the mike-septal leads.  Elevated J point V3-V6, inferior leads - most C/W early repolarization.  Normal left ventricular function and wall motion at rest and post-stress.  The visual ejection fraction is estimated at 60-65%.     Stress Results     Protocol:  Eliezer Protocol        Maximum Predicted HR:   158 bpm         Target HR: 134 bpm               % Maximum Predicted HR: 66 %        Stage  DurationHeart Rate  BP                    Comment             (mm:ss)   (bpm)    Stage 1   3:00      104   158/80Patient requested to stop   RecoveryR  4:00      56    134/74RPP = 29234, Sterling = 2, FAC = Below average             Stress Duration:   3:00 mm:ss        Recovery Time: 4:00 mm:ss          Maximum Stress HR: 104 bpm           METS:          4     Mitral Valve  There is no mitral regurgitation noted.     Tricuspid Valve  No tricuspid regurgitation.     Aortic Valve  No aortic regurgitation is present. No hemodynamically significant valvular  aortic stenosis.  ______________________________________________________________________________  MMode/2D Measurements & Calculations  IVSd: 1.00 cm     LVIDd: 4.0 cm  LVIDs: 2.8 cm  LVPWd: 0.73 cm  ECG Reviewed: Date: Results:    Cath:  Date: Results:      METS/Exercise Tolerance: 3 - Able to walk 1-2 blocks without  stopping    Hematologic: Comments: Lupus      Musculoskeletal: Comment: Charcot Breonna Tooth      GI/Hepatic:     (+) GERD,     Renal/Genitourinary:       Endo:       Psychiatric/Substance Use:     (+) psychiatric history anxiety     Infectious Disease:       Malignancy:       Other:            Physical Exam    Airway        Mallampati: III   TM distance: > 3 FB   Neck ROM: full   Mouth opening: > 3 cm    Respiratory Devices and Support         Dental       (+) loose    B=Bridge, C=Chipped, L=Loose, M=Missing    Cardiovascular          Rhythm and rate: regular and normal     Pulmonary           (+) decreased breath sounds           OUTSIDE LABS:  CBC:   Lab Results   Component Value Date    WBC 6.4 05/17/2023    WBC 9.9 12/29/2022    HGB 13.3 05/17/2023    HGB 10.1 (L) 12/29/2022    HCT 40.1 05/17/2023    HCT 30.8 (L) 12/29/2022     05/17/2023     12/29/2022     BMP:   Lab Results   Component Value Date     (L) 05/17/2023     (L) 12/29/2022    POTASSIUM 4.6 05/17/2023    POTASSIUM 4.3 12/29/2022    CHLORIDE 100 05/17/2023    CHLORIDE 98 12/29/2022    CO2 22 05/17/2023    CO2 24 12/29/2022    BUN 13.7 05/17/2023    BUN 17 12/29/2022    CR 0.74 05/17/2023    CR 0.69 12/29/2022    GLC 99 05/17/2023     (H) 12/30/2022     COAGS:   Lab Results   Component Value Date    PTT 24 05/17/2023    INR 0.94 05/17/2023     POC:   Lab Results   Component Value Date    BGM 87 07/08/2021     HEPATIC:   Lab Results   Component Value Date    ALBUMIN 3.9 12/09/2022    PROTTOTAL 6.9 12/09/2022    ALT 22 12/09/2022    AST 24 12/09/2022    ALKPHOS 80 12/09/2022    BILITOTAL 0.2 12/09/2022     OTHER:   Lab Results   Component Value Date    PH 7.42 03/19/2010    LACT 1.1 08/10/2021    A1C 5.3 02/15/2022    SONIA 9.2 05/17/2023    PHOS 4.0 07/09/2021    MAG 2.1 08/13/2021    LIPASE 132 08/10/2021    AMYLASE 46 08/14/2017    TSH 0.77 01/05/2021    T4 1.19 03/28/2017    CRP <2.9 09/18/2014    SED 19 12/09/2022        Anesthesia Plan    ASA Status:  3   NPO Status:  NPO Appropriate    Anesthesia Type: General.     - Airway: ETT   Induction: Intravenous.   Maintenance: Balanced.   Techniques and Equipment:     - Lines/Monitors: Arterial Line     Consents    Anesthesia Plan(s) and associated risks, benefits, and realistic alternatives discussed. Questions answered and patient/representative(s) expressed understanding.    - Discussed:     - Discussed with:  Patient      - Extended Intubation/Ventilatory Support Discussed: No.      - Patient is DNR/DNI Status: No    Use of blood products discussed: No .     Postoperative Care    Pain management: Multi-modal analgesia.   PONV prophylaxis: Ondansetron (or other 5HT-3), Dexamethasone or Solumedrol     Comments:                Jimbo Wells MD

## 2023-05-27 VITALS
OXYGEN SATURATION: 98 % | TEMPERATURE: 98.1 F | RESPIRATION RATE: 16 BRPM | DIASTOLIC BLOOD PRESSURE: 61 MMHG | HEART RATE: 56 BPM | BODY MASS INDEX: 20.34 KG/M2 | HEIGHT: 63 IN | WEIGHT: 114.8 LBS | SYSTOLIC BLOOD PRESSURE: 143 MMHG

## 2023-05-27 LAB
GLUCOSE BLDC GLUCOMTR-MCNC: 104 MG/DL (ref 70–99)
GLUCOSE BLDC GLUCOMTR-MCNC: 109 MG/DL (ref 70–99)

## 2023-05-27 PROCEDURE — 250N000011 HC RX IP 250 OP 636: Performed by: STUDENT IN AN ORGANIZED HEALTH CARE EDUCATION/TRAINING PROGRAM

## 2023-05-27 PROCEDURE — 250N000013 HC RX MED GY IP 250 OP 250 PS 637: Performed by: STUDENT IN AN ORGANIZED HEALTH CARE EDUCATION/TRAINING PROGRAM

## 2023-05-27 PROCEDURE — 041K0KL BYPASS RIGHT FEMORAL ARTERY TO POPLITEAL ARTERY WITH NONAUTOLOGOUS TISSUE SUBSTITUTE, OPEN APPROACH: ICD-10-PCS | Performed by: SURGERY

## 2023-05-27 RX ORDER — OXYCODONE HYDROCHLORIDE 5 MG/1
5 TABLET ORAL EVERY 4 HOURS PRN
Qty: 12 TABLET | Refills: 0 | Status: SHIPPED | OUTPATIENT
Start: 2023-05-27 | End: 2023-06-08

## 2023-05-27 RX ADMIN — ASPIRIN 81 MG CHEWABLE TABLET 81 MG: 81 TABLET CHEWABLE at 08:36

## 2023-05-27 RX ADMIN — AMLODIPINE BESYLATE 5 MG: 5 TABLET ORAL at 08:37

## 2023-05-27 RX ADMIN — GABAPENTIN 100 MG: 100 CAPSULE ORAL at 08:37

## 2023-05-27 RX ADMIN — HEPARIN SODIUM 5000 UNITS: 5000 INJECTION, SOLUTION INTRAVENOUS; SUBCUTANEOUS at 08:36

## 2023-05-27 RX ADMIN — SENNOSIDES AND DOCUSATE SODIUM 1 TABLET: 50; 8.6 TABLET ORAL at 08:37

## 2023-05-27 RX ADMIN — FLUTICASONE FUROATE AND VILANTEROL 1 PUFF: 200; 25 POWDER RESPIRATORY (INHALATION) at 08:40

## 2023-05-27 RX ADMIN — OMEPRAZOLE 20 MG: 20 CAPSULE, DELAYED RELEASE ORAL at 08:37

## 2023-05-27 RX ADMIN — CLOPIDOGREL BISULFATE 75 MG: 75 TABLET ORAL at 08:37

## 2023-05-27 RX ADMIN — CARVEDILOL 6.25 MG: 6.25 TABLET, FILM COATED ORAL at 08:37

## 2023-05-27 RX ADMIN — OXYCODONE HYDROCHLORIDE 10 MG: 5 TABLET ORAL at 01:00

## 2023-05-27 RX ADMIN — LISINOPRIL 20 MG: 20 TABLET ORAL at 08:37

## 2023-05-27 ASSESSMENT — ACTIVITIES OF DAILY LIVING (ADL)
ADLS_ACUITY_SCORE: 19
ADLS_ACUITY_SCORE: 18

## 2023-05-27 NOTE — DISCHARGE SUMMARY
Vascular Surgery Discharge Summary     NAME: Shirley Hendricks   MRN: [id number]   : 1960     DATE OF ADMISSION: 2023     PRE/POSTOPERATIVE DIAGNOSES: Failed right distal graft to below-knee popliteal vein bypass graft.    PROCEDURES PERFORMED: Right distal femoral popliteal graft to midanterior tibial cadaveric SFA bypass.    PATHOLOGY RESULTS: None     CULTURE RESULTS: None     INTRAOPERATIVE COMPLICATIONS: None     POSTOPERATIVE MEDICAL ISSUES: None     DRAINS/TUBES PRESENT AT DISCHARGE: None    DATE OF DISCHARGE:      HOSPITAL COURSE: Shirley Hendricks is a 62 year old female who on 2023 who underwent the above-named procedures.  She tolerated the operation well and postoperatively was transferred to the general post-surgical unit.  The remainder of her course was essentially uncomplicated.  Prior to discharge, her pain was controlled well, she was able to perform ADLs and ambulate independently without difficulty, and had full return of bowel and bladder function.  On , she was discharged to home in stable condition     DISCHARGE EXAM:   A&O, NAD  Resp non-labored  Distal extremities warm    Incisions C/D with no hematoma  Palpable AT and graft pulse      DISCHARGE INSTRUCTIONS:  Discharge Procedure Orders   Follow-up and recommended labs and tests    Order Comments: Follow up with me,  Chadwick Lozano MD, within 2 weeks. to evaluate after surgery.  The following labs/tests are recommended: Graft Duplex in 2 months.     Activity   Order Comments: Your activity upon discharge: activity as tolerated.  May shower.     Order Specific Question Answer Comments   Is discharge order? Yes      Diet   Order Comments: Follow this diet upon discharge: Advance to a regular diet as tolerated     Order Specific Question Answer Comments   Is discharge order? Yes        DISCHARGE MEDICATIONS:   Current Discharge Medication List      START taking these medications    Details   oxyCODONE  (ROXICODONE) 5 MG tablet Take 1 tablet (5 mg) by mouth every 4 hours as needed for moderate pain  Qty: 12 tablet, Refills: 0    Associated Diagnoses: Atherosclerosis of bypass graft of right lower extremity with other clinical manifestation (H)         CONTINUE these medications which have NOT CHANGED    Details   acetaminophen (TYLENOL) 500 MG tablet Take 500-1,000 mg by mouth every 6 hours as needed for mild pain      amLODIPine (NORVASC) 5 MG tablet Take 1 tablet (5 mg) by mouth 2 times daily  Qty: 180 tablet, Refills: 3    Associated Diagnoses: Essential hypertension      aspirin (ASA) 81 MG chewable tablet Take 1 tablet (81 mg) by mouth daily  Qty: 30 tablet, Refills: 3    Associated Diagnoses: PAD (peripheral artery disease) (H)      BREO ELLIPTA 200-25 MCG/ACT inhaler Inhale 1 puff into the lungs daily  Qty: 120 each, Refills: 11    Associated Diagnoses: Chronic obstructive pulmonary disease, unspecified COPD type (H)      Calcium Carb-Cholecalciferol (CALCIUM CARBONATE-VITAMIN D3) 600-400 MG-UNIT TABS TAKE ONE TABLET BY MOUTH TWICE DAILY  Qty: 180 tablet, Refills: 3    Associated Diagnoses: Osteoporosis, unspecified osteoporosis type, unspecified pathological fracture presence      carvedilol (COREG) 6.25 MG tablet TAKE ONE TABLET BY MOUTH TWICE A DAY WITH MEALS  Qty: 180 tablet, Refills: 3    Associated Diagnoses: Essential hypertension      clopidogrel (PLAVIX) 75 MG tablet Take 1 tablet (75 mg) by mouth daily  Qty: 90 tablet, Refills: 3    Associated Diagnoses: Peripheral vascular disease (H)      denosumab (PROLIA) 60 MG/ML SOLN injection Inject 1 mL (60 mg) Subcutaneous every 6 months INDICATION: TO TREAT OSTEOPOROSIS  Qty: 1 Syringe, Refills: 0    Associated Diagnoses: Osteoporosis without current pathological fracture, unspecified osteoporosis type      diphenhydrAMINE (BENADRYL) 25 MG tablet take Benadryl 25-50 mg one hour prior to the procedure  Qty: 2 tablet, Refills: 0    Associated Diagnoses:  Contrast media allergy      gabapentin (NEURONTIN) 100 MG capsule Take 1 capsule (100 mg) by mouth 3 times daily  Qty: 90 capsule, Refills: 3    Associated Diagnoses: Critical lower limb ischemia (H)      lisinopril (ZESTRIL) 20 MG tablet Take 1 tablet (20 mg) by mouth 2 times daily.  Qty: 180 tablet, Refills: 3    Associated Diagnoses: Essential hypertension      nitroGLYcerin (NITROSTAT) 0.4 MG sublingual tablet Place 1 tablet (0.4 mg) under the tongue See Admin Instructions for chest pain  Qty: 30 tablet, Refills: 11    Associated Diagnoses: Coronary artery disease involving native coronary artery of native heart without angina pectoris      omeprazole (PRILOSEC) 20 MG DR capsule TAKE ONE CAPSULE BY MOUTH DAILY  Qty: 90 capsule, Refills: 3    Comments: Prior intolerance of pantoprazole.  No issues with omeprazole.  Fill prescription as prescribed  Associated Diagnoses: Gastroesophageal reflux disease with esophagitis without hemorrhage      rosuvastatin (CRESTOR) 40 MG tablet Take 1 tablet (40 mg) by mouth At Bedtime  Qty: 90 tablet, Refills: 1    Associated Diagnoses: Hyperlipidemia LDL goal <70                      Laura Wells MD  Fellow     STAFF: Agree with above.  Postoperative activities and dressing care discussed with patient.  Follow-up in 2 weeks.  Duplex scan 8 weeks.   Chadwick Lozano MD

## 2023-05-27 NOTE — PROGRESS NOTES
[id number]. A/O. SBA. R leg incision WDL. Doppler pulses. Tolerating diet. Voiding fine. Oxy given. CMS intact. Refused capno, put on 2 L O2 @ HS

## 2023-05-27 NOTE — PROGRESS NOTES
Vascular Surgery Progress Note:  POD#1    S: Overall very comfortable.  Pain well controlled with occasional oxycodone.  Anxious to go home.     O:   Vitals:  BP  Min: 111/58  Max: 181/105  Temp  Av.8  F (36.6  C)  Min: 96.9  F (36.1  C)  Max: 98.5  F (36.9  C)  Pulse  Av.4  Min: 50  Max: 82  I/O last 3 completed shifts:  In: 3183 [P.O.:930; I.V.:]  Out:  [Urine:; Blood:25]    Physical Exam: Very comfortable this morning.   Surgical sites=A   +3 right graft pulse and +3 right DP   Warm and hyperemic foot.      Assessment/Plan: Doing very well following bypass to anterior tibial artery.  Markedly improved circulation.  We will continue chronic Plavix and aspirin.  Hold this morning with family.  Follow-up with me in 2 weeks.  Graft duplex in approximately 2 months.      Wm. Blake MD

## 2023-05-27 NOTE — PLAN OF CARE
A&OX4, 2 L NC, VSS, oxycodone given for pain on the right distal leg and RLE elevated on pillows, CMS intact. On regular diet, up with SBA.

## 2023-05-27 NOTE — PLAN OF CARE
Pt is POD 1 of R Fem popliteal bypass graft. VSS on Ra. Up Ind. PIV removed. BS active, passing gas yet no bm yet. Tolerating regular diet. Voiding adequately. Incision CDI. Pulses palpable and CMS intact. Discharge instructions and medications reviewed. Pt discharged home with family.

## 2023-05-30 ENCOUNTER — PATIENT OUTREACH (OUTPATIENT)
Dept: CARE COORDINATION | Facility: CLINIC | Age: 63
End: 2023-05-30
Payer: COMMERCIAL

## 2023-05-30 NOTE — PROGRESS NOTES
Clinic Care Coordination Contact  [facility]: Post-Discharge Note  SITUATION                                                      Admission:    Admission Date: 05/26/23   Reason for Admission: Right distal femoral popliteal graft to midanterior tibial cadaveric SFA bypass.  Discharge:   Discharge Date: 05/27/23  Discharge Diagnosis: Right distal femoral popliteal graft to midanterior tibial cadaveric SFA bypass.    BACKGROUND                                                      Per hospital discharge summary and inpatient provider notes:    Shirley Hendricks is a 62 year old female who on 5/26/2023 who underwent the above-named procedures.  She tolerated the operation well and postoperatively was transferred to the general post-surgical unit.  The remainder of her course was essentially uncomplicated.  Prior to discharge, her pain was controlled well, she was able to perform ADLs and ambulate independently without difficulty, and had full return of bowel and bladder function.  On 5/27, she was discharged to home in stable condition     ASSESSMENT           Discharge Assessment  How are you doing now that you are home?: I am doing good. I am watching what I do. The pain isn't there.  How are your symptoms? (Red Flag symptoms escalate to triage hotline per guidelines): Improved  Do you feel your condition is stable enough to be safe at home until your provider visit?: Yes  Does the patient have their discharge instructions? : Yes  Does the patient have questions regarding their discharge instructions? : No  Were you started on any new medications or were there changes to any of your previous medications? : Yes  Does the patient have all of their medications?: Yes  Do you have questions regarding any of your medications? : No  Discharge follow-up appointment scheduled within 14 calendar days? : Yes  Discharge Follow Up Appointment Date: 06/08/23  Discharge Follow Up Appointment Scheduled with?: Specialty Care  Provider    Post-op ([facility] Only)  If the patient had a surgery or procedure, do they have any questions for a nurse?: No             PLAN                                                      Outpatient Plan:  Follow up with me, Chadwick Lozano MD, within 2 weeks. to evaluate after surgery. The following labs/tests are  recommended: Graft Duplex in 2 months    Future Appointments   Date Time Provider Department Center   6/8/2023  9:30 AM Chadwick Lozano MD [facility]         For any urgent concerns, please contact our 24 hour nurse triage line: [phone] ([phone])         DEWAYNE Acosta  [phone]  [facility]

## 2023-05-31 LAB
ATRIAL RATE - MUSE: 50 BPM
DIASTOLIC BLOOD PRESSURE - MUSE: NORMAL MMHG
INTERPRETATION ECG - MUSE: NORMAL
P AXIS - MUSE: 21 DEGREES
PR INTERVAL - MUSE: 164 MS
QRS DURATION - MUSE: 82 MS
QT - MUSE: 440 MS
QTC - MUSE: 401 MS
R AXIS - MUSE: 32 DEGREES
SYSTOLIC BLOOD PRESSURE - MUSE: NORMAL MMHG
T AXIS - MUSE: 69 DEGREES
VENTRICULAR RATE- MUSE: 50 BPM

## 2023-06-02 NOTE — PATIENT INSTRUCTIONS
CT soft tissue of the neck without contrast in early June.  Central scheduling will call you or you may call  to schedule CT  Continue current medications  I encourage you to stop all smoking.  If  willing to quit in the future,   contact  [employer] toll-free number (5-729-QUIT-NOW) or through the internet  (quitpartnermn.com) for free nicotine supplementation treatment and/or counseling  Mammogram. This will be done at the [facility]. Call [phone] or use [employer] to schedule.

## 2023-06-06 ENCOUNTER — TELEPHONE (OUTPATIENT)
Dept: OTHER | Facility: CLINIC | Age: 63
End: 2023-06-06
Payer: COMMERCIAL

## 2023-06-06 NOTE — TELEPHONE ENCOUNTER
Brief chart review:    5/26/23 - Right distal femoral popliteal graft to midanterior tibial cadaveric SFA bypass with Dr. Lozano.    Future Appointments   Date Time Provider Department Center   6/8/2023  9:30 AM Chadwick Lozano MD [facility]       Returned call to patient. M requesting call back.    ESTHER PabonN, RN-[facility]

## 2023-06-06 NOTE — PROGRESS NOTES
[place] [facility]    Shirley Hendricks returns for first postop follow-up.  History of a remote aortobifemoral bypass graft and left femoral to popliteal in situ bypass graft which is functioning well.     Has had several bypass grafts in the right leg.  This includes a right femoral limb graft to above-knee popliteal eventual cadaveric SFA graft.  Outflow occluded and jump grafts were performed most recently with cephalic vein to below-knee popliteal artery.  This with required angioplasty due to in vein stenosis with AT runoff.  This outflow graft occluded.    5/26/2023 redo bypass from the distal original graft in the distal thigh to the proximal one third of the anterior tibial artery using cadaveric SFA bypass.  Did very well.  Discharged on POD#1 with a palpable graft and DP pulse.  Home on chronic Plavix and aspirin.    She has done very well since surgery.  She has noticed some mild ankle swelling.  She had developed a scab on the posterior aspect of her right thigh incision but no signs of infection.  No issues with the anterior tibial incision.  Foot feels much better with reperfusion.    Exam: Alert and appropriate.  Normal affect.  Ambulatory.   Blood pressure 131/76.  Pulse 59   Small scab over posterior thigh incision but no infection or imminent issues   Well-healing right anterior tibial with several interrupted Monocryl--will dissolve   +3 palpable pulses in the right and left grafts and right DP   Excellent right DP Doppler.  Strong monophasic left PT greater than AT          Impression: #1.  Doing very well following outflow revision with cadaveric SFA to right proximal anterior tibial artery with palpable distal pulse.  On chronic aspirin and Plavix.  Follow-up duplex in 6 to 8 weeks.     #2.  Some minor skin issues on the right thigh.  This has been very common with almost all of her surgeries.  He is will be very cautious with this with no plan debridement.  We will contact me  if there is any concerns.     #3.  Left leg bypass graft working well.  Recent follow-up in May revealed this to be widely patent.     #4.  History of CEA.  Scheduled for repeat exam 11/2023    Chronic smoking with several cigars daily.  Knows she should quit but this will not occur.       Chadwick Lozano MD   This note was created using Dragon voice recognition software which may result in transcription errors.

## 2023-06-06 NOTE — TELEPHONE ENCOUNTER
[facility] VASCULAR [facility]    Who is the name of the provider?:  Blake    What is the location you see this provider at/preferred location?: April  Person calling / Facility: Shirley Hendricks  Phone number:  [phone]  Nurse call back needed:  YES     Reason for call:  Patient asking for assistance with questions related to bandages near incision site.    Pharmacy location:  n/a  Outside Imaging: n/a   Can we leave a detailed message on this number?  YES

## 2023-06-07 NOTE — TELEPHONE ENCOUNTER
Second call made to patient.    Patient inquired if Tegaderm dressings could be removed, as one was starting to peel off.  Advised patient she could remove dressings at this time.  Patient reports she is doing well, minimal swelling of right leg, mostly located at dorsal aspect of foot.  No complaints.  Patient aware to call with any questions or concerns prior to her appointment tomorrow with Dr. Lozano.    Alaina Yanez, ESTHERN, RN-[facility] [place]

## 2023-06-08 ENCOUNTER — OFFICE VISIT (OUTPATIENT)
Dept: OTHER | Facility: CLINIC | Age: 63
End: 2023-06-08
Attending: SURGERY
Payer: COMMERCIAL

## 2023-06-08 VITALS — DIASTOLIC BLOOD PRESSURE: 76 MMHG | SYSTOLIC BLOOD PRESSURE: 131 MMHG | HEART RATE: 59 BPM

## 2023-06-08 DIAGNOSIS — I73.9 PAD (PERIPHERAL ARTERY DISEASE) (H): Primary | ICD-10-CM

## 2023-06-08 PROCEDURE — G0463 HOSPITAL OUTPT CLINIC VISIT: HCPCS | Mod: 25

## 2023-06-08 PROCEDURE — 99024 POSTOP FOLLOW-UP VISIT: CPT | Performed by: SURGERY

## 2023-06-08 PROCEDURE — 93923 UPR/LXTR ART STDY 3+ LVLS: CPT

## 2023-06-08 NOTE — PROGRESS NOTES
[facility]        Patient is here for a  follow up.     Pt is currently taking Aspirin, Statin and Plavix.    /76 (BP Location: Left arm, Patient Position: Chair, Cuff Size: Adult Regular)   Pulse 59   LMP  (LMP Unknown)     The provider has been notified that the patient has no concerns.     Questions patient would like addressed today are: N/A.    Refills are needed: N/A    Has homecare services and agency name:  Isa Al MA

## 2023-06-21 DIAGNOSIS — E78.5 HYPERLIPIDEMIA LDL GOAL <70: ICD-10-CM

## 2023-06-23 RX ORDER — ROSUVASTATIN CALCIUM 40 MG/1
40 TABLET, COATED ORAL AT BEDTIME
Qty: 90 TABLET | Refills: 1 | Status: ON HOLD | OUTPATIENT
Start: 2023-06-23 | End: 2023-10-04

## 2023-06-29 DIAGNOSIS — I70.229 CRITICAL LOWER LIMB ISCHEMIA (H): ICD-10-CM

## 2023-06-29 RX ORDER — GABAPENTIN 100 MG/1
100 CAPSULE ORAL 3 TIMES DAILY
Qty: 90 CAPSULE | Refills: 0 | Status: SHIPPED | OUTPATIENT
Start: 2023-06-29 | End: 2023-09-15

## 2023-06-29 NOTE — TELEPHONE ENCOUNTER
gabapentin (NEURONTIN) 100 MG capsule  Last Written Prescription Date:  3/2/23 by Dr. Lozano  Last Fill Quantity: 90,  # refills: 3    Last office visit:  6/8/23  Follow up scheduled:  8/10/23 (RLE Arterial Duplex & office visit)    Unable to fill per [facility] protocol.  Medication and pharmacy loaded.

## 2023-07-14 DIAGNOSIS — K21.00 GASTROESOPHAGEAL REFLUX DISEASE WITH ESOPHAGITIS WITHOUT HEMORRHAGE: ICD-10-CM

## 2023-08-02 ENCOUNTER — NURSE TRIAGE (OUTPATIENT)
Dept: INTERNAL MEDICINE | Facility: CLINIC | Age: 63
End: 2023-08-02
Payer: COMMERCIAL

## 2023-08-02 ENCOUNTER — OFFICE VISIT (OUTPATIENT)
Dept: PEDIATRICS | Facility: CLINIC | Age: 63
End: 2023-08-02
Payer: COMMERCIAL

## 2023-08-02 ENCOUNTER — HOSPITAL ENCOUNTER (OUTPATIENT)
Dept: CT IMAGING | Facility: CLINIC | Age: 63
Discharge: HOME | End: 2023-08-02
Attending: PREVENTIVE MEDICINE | Admitting: PREVENTIVE MEDICINE
Payer: COMMERCIAL

## 2023-08-02 ENCOUNTER — OFFICE VISIT (OUTPATIENT)
Dept: URGENT CARE | Facility: URGENT CARE | Age: 63
End: 2023-08-02
Payer: COMMERCIAL

## 2023-08-02 VITALS
HEART RATE: 56 BPM | DIASTOLIC BLOOD PRESSURE: 69 MMHG | TEMPERATURE: 97.9 F | WEIGHT: 115.4 LBS | BODY MASS INDEX: 20.44 KG/M2 | OXYGEN SATURATION: 97 % | SYSTOLIC BLOOD PRESSURE: 119 MMHG

## 2023-08-02 VITALS
DIASTOLIC BLOOD PRESSURE: 76 MMHG | SYSTOLIC BLOOD PRESSURE: 117 MMHG | OXYGEN SATURATION: 96 % | RESPIRATION RATE: 20 BRPM | TEMPERATURE: 97.7 F | HEART RATE: 58 BPM

## 2023-08-02 DIAGNOSIS — R22.1 LUMP IN NECK: ICD-10-CM

## 2023-08-02 DIAGNOSIS — R22.1 LUMP IN NECK: Primary | ICD-10-CM

## 2023-08-02 DIAGNOSIS — M27.8 MASS OF JAW: Primary | ICD-10-CM

## 2023-08-02 DIAGNOSIS — L30.9 DERMATITIS: ICD-10-CM

## 2023-08-02 DIAGNOSIS — R21 RASH: ICD-10-CM

## 2023-08-02 PROCEDURE — 99207 REFERRAL TO ACUTE AND DIAGNOSTIC SERVICES: CPT | Performed by: PHYSICIAN ASSISTANT

## 2023-08-02 PROCEDURE — 99417 PROLNG OP E/M EACH 15 MIN: CPT | Performed by: PREVENTIVE MEDICINE

## 2023-08-02 PROCEDURE — 99215 OFFICE O/P EST HI 40 MIN: CPT | Performed by: PREVENTIVE MEDICINE

## 2023-08-02 PROCEDURE — 70490 CT SOFT TISSUE NECK W/O DYE: CPT

## 2023-08-02 RX ORDER — TRIAMCINOLONE ACETONIDE 1 MG/G
OINTMENT TOPICAL 2 TIMES DAILY
Qty: 80 G | Refills: 0 | Status: SHIPPED | OUTPATIENT
Start: 2023-08-02 | End: 2023-08-16

## 2023-08-02 NOTE — PATIENT INSTRUCTIONS
(M27.8) Mass of jaw  (primary encounter diagnosis)  Comment:   Plan: referred to [facility] in [place] for further evaluation today.    (L30.9) Dermatitis  Comment: chronic for one year  Plan: will also have evaluated at [facility] today, may still need follow up with Dermatology regardless

## 2023-08-02 NOTE — PROGRESS NOTES
Assessment & Plan     (R22.1) Lump in neck  (primary encounter diagnosis)  Plan: CT Soft Tissue Neck w/o Contrast, Adult ENT          Referral    CT neck reassuring  Referred to ENT for further assessment of neck lymphadenopathy (at angle on jaw on right - ongoing for 2 months)    (R21) Rash  Plan: triamcinolone (KENALOG) 0.1 % external ointment  ?prurigo nodularis  Triamcinolone two times per day for 2 weeks, avoid itching  Follow up with pcp in 2 weeks for recheck      104 minutes spent by me on the date of the encounter doing chart review, history and exam, documentation and further activities per the note       Nicotine/Tobacco Cessation:  She reports that she has been smoking cigarettes. She started smoking about 55 years ago. She has a 26.00 pack-year smoking history. She has never used smokeless tobacco.  Nicotine/Tobacco Cessation Plan:   Information offered: Patient not interested at this time      See Patient Instructions    No follow-ups on file.    Shubham Pablo MD  [facility]    Vishal Watson is a 62 year old, presenting for the following health issues:  Mass      HPI     Concern - Patient reports one year history of lumps in neck and left jowl. Since the end of May, she notes a lump on the right side as well.  Onset: End of May  Description: Lump on right side of jaw, soft tissue. Does cause some trouble chewing/swallowing.  Intensity: mild pain at times  Progression of Symptoms:  worsening  Accompanying Signs & Symptoms: Lethargy, chronic  Previous history of similar problem: Had a thyroid ultrasound in March:  1. Heterogeneous thyroid with scattered benign cysts. No suspicious solid thyroid nodule.  2.  Mild interval growth of bilateral level 1B lymph nodes with abnormal morphology. There is also a left level 6 lymph node with abnormal morphology. Tissue sampling of at least the dominant right level 1B lymph node is recommended.      Precipitating factors:        Worsened by: Chewing, touching it  Alleviating factors:        Improved by: Rest  Therapies tried and outcome: None    This is a 63 yo female who presents with lump inferior to the r angle of jaw and another lump left of mouth.  Both present for 2 months.  Neither is painful.  Slowly growing.  Previous adenopathy noted to be enlarging on ct imaging in 3/2023.  No weight change.  No night sweats.  No other concerning symptoms.    Review of Systems   Constitutional, HEENT, cardiovascular, pulmonary, GI, , musculoskeletal, neuro, skin, endocrine and psych systems are negative, except as otherwise noted.      Objective    /76 (BP Location: Left arm, Patient Position: Sitting, Cuff Size: Adult Regular)   Pulse 58   Temp 97.7  F (36.5  C) (Oral)   Resp 20   LMP  (LMP Unknown)   SpO2 96%   There is no height or weight on file to calculate BMI.  Physical Exam   GENERAL: healthy, alert and no distress  EYES: Eyes grossly normal to inspection, PERRL and conjunctivae and sclerae normal  HENT: ear canals and TM's normal, nose and mouth without ulcers or lesions  NECK: no adenopathy, no asymmetry, masses, or scars and thyroid normal to palpation  RESP: lungs clear to auscultation - no rales, rhonchi or wheezes  CV: regular rate and rhythm, normal S1 S2, no S3 or S4, no murmur, click or rub, no peripheral edema and peripheral pulses strong  ABDOMEN: soft, nontender, no hepatosplenomegaly, no masses and bowel sounds normal  MS: no gross musculoskeletal defects noted, no edema  SKIN: no suspicious lesions or rashes  NEURO: Normal strength and tone, mentation intact and speech normal  PSYCH: mentation appears normal, affect normal/bright  Neck - no LAD, TM, JVD, small mobile hard lump at angle of jaw on right.  Also small lump just left of mouth.    Results for orders placed or performed during the hospital encounter of 08/02/23   CT Soft Tissue Neck w/o Contrast     Status: None     Narrative    EXAM: CT SOFT TISSUE NECK W/O CONTRAST  LOCATION: [facility]  DATE: 8/2/2023    INDICATION: r neck lump, left lower face lump  COMPARISON: None.  TECHNIQUE: Routine CT Soft Tissue Neck without IV contrast. Multiplanar reformats. Dose reduction techniques were used.    FINDINGS:     MUCOSAL SPACES/SOFT TISSUES: Normal mucosal spaces of the upper aerodigestive tract within the limits of noncontrast imaging. No definite mucosal mass or inflammation identified. Normal vocal cords and infraglottic trachea. Normal parapharyngeal space   and subcutaneous soft tissues. Normal oral cavity,  spaces, and floor of mouth structures.    LYMPH NODES: Slightly enlarged lymph node at the medial aspect of right mandibular angle; nonspecific.     SALIVARY GLANDS: Normal parotid and submandibular glands.    THYROID: 1 cm nodule right thyroid gland; ultrasound recommended, if not present previously.     VISUALIZED INTRACRANIAL/ORBITS/SINUSES: No abnormality of the visualized intracranial compartment or orbits. Visualized paranasal sinuses and mastoid air cells are clear.    OTHER: No destructive osseous lesion. The included lung apices are clear.      Impression    IMPRESSION:   1.  Slightly enlarged lymph node at the medial aspect of right mandibular angle; nonspecific.  2.  Remaining soft tissues of the neck appear normal.  3.  1 cm nodule right thyroid gland; ultrasound recommended, if not present previously.

## 2023-08-02 NOTE — TELEPHONE ENCOUNTER
"Pt with new jaw pain and lump. Denies fever/redness/swelling. States 6/10 pain with jaw movement and chewing since July. Lumps in neck are not new but jaw lump is new within last month or so.     Disposition states see in office today. No appts available with PCP or team. Recommend UC, pt verbalized understanding and agrees with plan.     Reason for Disposition   Face pain present > 24 hours    Additional Information   Negative: Shock suspected (e.g., cold/pale/clammy skin, too weak to stand, low BP, rapid pulse)   Negative: Similar pain previously and it was from 'heart attack'   Negative: Similar pain previously and it was from 'angina' and not relieved by nitroglycerin   Negative: Sounds like a life-threatening emergency to the triager   Negative: Chest pain   Negative: Sinus pain and congestion   Negative: Headache or pain in upper forehead   Negative: Toothache is main symptom   Negative: Followed a face injury   Negative: Difficulty breathing or unusual sweating (e.g., sweating without exertion)   Negative: Can't close the mouth fully (i.e., \"mouth is locked open\", patient will have difficulty talking)   Negative: Fever and localized red rash   Negative: Fever and area of face is swollen   Negative: New-onset jaw pain of unknown cause AND at least one cardiac risk factor (i.e., hypertension, diabetes mellitus, obesity, smoker or strong family history of heart disease)   Negative: Patient sounds very sick or weak to the triager   Negative: SEVERE pain (e.g., excruciating, unable to do any normal activities)   Negative: Localized red rash and painful to the touch   Negative: Painful rash with multiple small blisters grouped together (i.e., dermatomal distribution or 'band' or 'stripe')   Negative: Swollen area of face and toothache   Negative: Swollen area of face that is painful to touch   Negative: Swelling around the eye    Answer Assessment - Initial Assessment Questions  1. ONSET: \"When did the pain start?\" " "(e.g., minutes, hours, days)      Jaw pain from lump on lower left jaw. Started late April, gotten bigger recently over last month  2. ONSET: \"Does the pain come and go, or has it been constant since it started?\" (e.g., constant, intermittent, fleeting)      Intermittently during jaw movement or chewing   3. SEVERITY: \"How bad is the pain?\"   (Scale 1-10; mild, moderate or severe)    - MILD (1-3): doesn't interfere with normal activities     - MODERATE (4-7): interferes with normal activities or awakens from sleep     - SEVERE (8-10): excruciating pain, unable to do any normal activities       6/10  4. LOCATION: \"Where does it hurt?\"       Lower left side jaw  5. RASH: \"Is there any redness, rash, or swelling of the face?\"      none  6. FEVER: \"Do you have a fever?\" If Yes, ask: \"What is it, how was it measured, and when did it start?\"       none  7. OTHER SYMPTOMS: \"Do you have any other symptoms?\" (e.g., fever, toothache, nasal discharge, nasal congestion, clicking sensation in jaw joint)      Itchiness on back from neck to hips happening for 1 year, using cortisone cream but not working    Protocols used: Face Pain-A-[place]    "

## 2023-08-02 NOTE — PROGRESS NOTES
"Patient presents with:  Jaw Pain: Lump on right jaw since May 2023 has difficulty chewing.   Rash: On the back since last year, has been applying cortisone cream OTC not helping.     (M27.8) Mass of jaw  (primary encounter diagnosis)  Comment: discussed patient with Dr. Pablo at [facility]  Plan: referred to [facility] in [place] for further evaluation today.    (L30.9) Dermatitis  Comment: chronic for one year  Plan: will also have evaluated at [facility] today, may still need follow up with Dermatology regardless      SUBJECTIVE:   Shirley Hendricks is a 62 year old female who presents today with the followin) tender mass on right jaw present for about 3 months, seems to be increasing in size as well as tenderness.  Denies any trauma to the area.  She also reports a smaller mass in her left chin just below her lip line present about the same time.    2) itchy skin rash on upper and lower back for the past year, has been using over-the-counter Cortaid cream without any relief.  She reports that in  she was diagnosed with \"skin lupus\" but has not had issues with it since around that time.      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 clot    " Post-menopausal     Reflux esophagitis 02/2004    EGD: esophagitis and medium HH    SBO (small bowel obstruction) (H) 08/10/2021    SVT (supraventricular tachycardia) (H)     Thrombocytopenia (H)     Uncomplicated asthma     Vitamin C deficiency 08/12/2018         Current Outpatient Medications   Medication Sig Dispense Refill    Multiple Vitamins-Iron (DAILY-NATTY/IRON/BETA-CAROTENE) TABS TAKE 1 TABLET BY MOUTH DAILY. (Patient not taking: Reported on 10/19/2020) 30 tablet 7     Social History     Tobacco Use    Smoking status: Never Smoker    Smokeless tobacco: Never Used   Substance Use Topics    Alcohol use: Not on file     Family History   Problem Relation Age of Onset    Diabetes Mother     Diabetes Father          ROS:    10 point ROS of systems including Constitutional, Eyes, Respiratory, Cardiovascular, Gastroenterology, Genitourinary, Integumentary, Muscularskeletal, Psychiatric ,neurological were all negative except for pertinent positives noted in my HPI       OBJECTIVE:  /69 (BP Location: Right arm, Patient Position: Sitting, Cuff Size: Adult Regular)   Pulse 56   Temp 97.9  F (36.6  C) (Tympanic)   Wt 52.3 kg (115 lb 6.4 oz)   LMP  (LMP Unknown)   SpO2 97%   BMI 20.44 kg/m    Physical Exam:  GENERAL APPEARANCE: healthy, alert and no distress  EYES: EOMI,  PERRL, conjunctiva clear  HENT: ear canals and TM's normal.  Nose and mouth without ulcers, erythema or lesions  Tender nodule on right jaw angle approximately 1.5cm in diameter, mobile.    Similar but smaller nodule in soft tissue of left chin just below left lower lip.    NECK: supple, nontender, with small palpable mass in right mid neck (with overlying skin scar)  SKIN: excoriated raised lesions on bilateral upper back and lower back, mid back spared.  No vesicles or drainage.    Healing skin tear on right forearm.

## 2023-08-03 ENCOUNTER — TELEPHONE (OUTPATIENT)
Dept: OTOLARYNGOLOGY | Facility: CLINIC | Age: 63
End: 2023-08-03
Payer: COMMERCIAL

## 2023-08-03 NOTE — TELEPHONE ENCOUNTER
This patient needs a New appt with either Terrell Kay, or Amber.    Appt Note- Ref by Shubham Pablo MD for Lump in Neck

## 2023-08-03 NOTE — TELEPHONE ENCOUNTER
[facility] Call Center    Phone Message    May a detailed message be left on voicemail: no     Reason for Call: Appointment Intake    Referring Provider Name: Shubham Pablo MD   Diagnosis and/or Symptoms: Lump in neck    Priority referral- per protos, send TE to clinic    Action Taken: Message routed to:  [facility] ENT    Travel Screening: Not Applicable

## 2023-08-04 ENCOUNTER — TELEPHONE (OUTPATIENT)
Dept: OTOLARYNGOLOGY | Facility: CLINIC | Age: 63
End: 2023-08-04
Payer: COMMERCIAL

## 2023-08-07 NOTE — PROGRESS NOTES
[place] VASCULAR [facility]    Shirley Hendricks has a history of multiple vascular issues and returns for follow-up.    Vascular history:   -3/17/2010: Left femoral to popliteal in situ   -3/26/2010 aortobifemoral bypass    -Right femoral limb graft above-knee popliteal vein bypass with occlusion and eventual cadaveric SFA graft.  Occluded outflow with jump graft most recently cephalic vein to below-knee popliteal.  Outflow stenosis with angioplasty it was not successful     -5/26/2023 redo bypass from the original distal graft in the thigh to the proximal one third of the anterior tibial artery using cadaveric SFA.  Widely patent 6/8/2023       -5/11/2016: Symptomatic right carotid.  CEA with distal facial vein patch    PRESENT SITUATION: Has done very well since surgery.  Claudication symptoms completely resolved.  Remains very active.  Some mild ankle swelling that improves with elevation on the operating right leg.  Incisions of healed well.  Still smoking.  Averaging one half to three-quarter pack daily (knows she needs to quit but unlikely)    Exam: Alert and appropriate.  Normal affect.  Fully ambulatory.   Blood pressure 156/90 left arm.  Pulse 65.   Chest= clear   Cardiovascular= regular rate   Mild right calf and ankle edema.  Normal skin and sensation    Well-healed right distal thigh and anterior lateral calf incisions    +3 palpable right and left grafts    +3 palpable DP pulses bilaterally    +3 multiphasic Doppler signals in both dorsalis pedis arteries.        Right graft duplex today reveals both the original graft and the jump graft revision are widely patent with good flow.  Smaller native artery at 1.7 mm with no stenosis.        IMPRESSION: #1.  Patent right leg bypass graft is now revised down to anterior tibial artery with good outflow.  Continue on aspirin/Plavix indefinitely.  Repeat duplex in 3 months.     #2.  Patent left femoral to popliteal in situ bypass graft.  Last duplex  5/4/2023 and this will be performed in 3 months.     #3.  Status post right CEA.  Last carotid duplex 12/22/2022 and will be due in three months.     #4.  Did have some fullness in the right mandibular angle.  Slightly enlarged lymph node noted on CT scan on 8/2/2023 with no other concerning issues found.    20 minutes with patient today including chart review.  Again discussed smoking cessation     OSMAN URIOSTEGUI MD  This note was created using Dragon voice recognition software which may result in transcription errors.

## 2023-08-08 DIAGNOSIS — I10 ESSENTIAL HYPERTENSION: ICD-10-CM

## 2023-08-08 RX ORDER — CARVEDILOL 6.25 MG/1
TABLET ORAL
Qty: 180 TABLET | Refills: 0 | OUTPATIENT
Start: 2023-08-08

## 2023-08-08 NOTE — TELEPHONE ENCOUNTER
Patient: Esther Valladares Date: 2021  : 1943 Attending: Jerod Araya MD  78 year old female       Chief Complaint: Shortness of Breath LVEF: 55%    Subjective: Up to restroom. No further shortness of breath.  No edema    Pertinent Reviewed:     Vitals Last Value 24 Hour Range  Temperature 97.3 °F (36.3 °C) Temp  Min: 97.3 °F (36.3 °C)  Max: 97.9 °F (36.6 °C)  Pulse 76 Pulse  Min: 75  Max: 83  Respiratory 18 Resp  Min: 14  Max: 18  Blood Pressure 110/73 BP  Min: 98/63  Max: 119/77  Arterial BP   No data recorded  Pulse Oximetry 97 % SpO2  Min: 95 %  Max: 97 %    Vitals Today Admission  Weight 90.2 kg (198 lb 13.7 oz) Weight: 96.6 kg (212 lb 15.4 oz)    Weight over the past 48 Hours:  Patient Vitals for the past 48 hrs:   Weight   21 0614 90.5 kg (199 lb 8.3 oz)   21 0455 90.2 kg (198 lb 13.7 oz)        Intake/Output:    I/O last 3 completed shifts:  In: 120 [P.O.:120]  Out: 1650 [Urine:1650]      Intake/Output Summary (Last 24 hours) at 2021 0957  Last data filed at 2021 0455  Gross per 24 hour   Intake 120 ml   Output 1650 ml   Net -1530 ml       Rhythm: Normal Sinus Rhythm    Medications/Infusions:  Scheduled:   • furosemide  20 mg Oral Daily   • vancomycin (VANCOCIN) IVPB  1,000 mg Intravenous Q18H   • VANCOMYCIN - PHARMACIST MONITORED   Does not apply See Admin Instructions   • sodium chloride (PF)  2 mL Intracatheter 2 times per day   • amLODIPine  5 mg Oral Daily   • atorvastatin  20 mg Oral QHS   • cyanocobalamin  125 mcg Oral Daily   • fenofibrate  54 mg Oral Daily   • gabapentin  600 mg Oral 2 times per day   • metFORMIN  500 mg Oral BID WC   • metoPROLOL succinate  25 mg Oral QHS   • enoxaparin  40 mg Subcutaneous Q24H   • potassium CHLORIDE  20 mEq Oral Daily with breakfast   • insulin lispro   Subcutaneous TID WC   • nystatin   Topical 2 times per day        Physical Exam:   General Appearance: alert, cooperative and no distress  Heart: regular rate and rhythm  Script sent to the pharmacy on 5/7/23 for 180 tablets with 3 additional refills on file.     Mary Murillo RN   and systolic murmur: 3/6  Lungs: diminished, no distress  Abdomen: soft, nondistended  Extremities: edema  trace  Pulses: +1 Bilateral Dorsalis Pedis  Neurological: Mental status: Alert, oriented, thought content appropriate    Laboratory Results:    Recent Labs   Lab 11/12/21  0612 11/12/21  0611 11/11/21  0604 11/10/21  0543 11/08/21  0621 11/07/21  0822   WBC  --  5.1  --   --  5.8 7.2   HCT  --  42.2  --   --  41.1 44.1   HGB  --  14.1  --   --  13.6 14.2   PLT  --  164  --   --  160 192   INR  --   --   --   --   --  1.0   PTT  --   --   --   --   --  25   SODIUM 136  --  136 136 140 137   POTASSIUM 3.7  --  3.9 3.8 3.9 3.8   CHLORIDE 103  --  102 104 108* 107   CO2 28  --  27 27 25 24   GLUCOSE 145*  --  133* 133* 152* 195*   BUN 18  --  24* 22* 15 14   CREATININE 0.54  --  0.55 0.61 0.61 0.56       Imaging:  XR CHEST PA AND LATERAL 2 VIEWS   Final Result by Yony Moore MD (11/10 0916)   1.  Left lower lobe infiltrate and effusion.   2.  Atelectasis and effusion at the right lung base.      Electronically Signed by: YONY MOORE M.D.    Signed on: 11/10/2021 9:16 AM          XR CHEST PA AND LATERAL 2 VIEWS   Final Result by Yony Moore MD (11/08 0817)   1.  Bilateral pleural effusions with increasing infiltrate at the left lung   base.      Electronically Signed by: YONY MOORE M.D.    Signed on: 11/8/2021 8:17 AM          XR CHEST PA OR AP 1 VIEW   Final Result by Evelina Hdz MD (11/07 0936)   Bibasilar opacities with tiny left pleural effusion suspected.      Electronically Signed by: EVELINA HDZ M.D.    Signed on: 11/7/2021 9:36 AM                  Impression  1. acute on chronic diastolic biventricular congestive heart failure  2 severe bioprosthetic aortic valve stenosis being evaluated for TAVR at [facility]  3 cath right and left heart to eval AS 9/2021 LAD mild plaque, 20% ostial RI, Normal LCX, Mild plaquing RCA, EF 50%, severe Bioprosthetic AS, mean grad 55.7mmHg, Mild to  mod Pulm HTN  4 PAD (peripheral artery disease) (I73.9)  Atrial duplex 8/2020, Biphasic flow both legs,  cannot walking in the afternoon, affect daily activities  cath 9/2020 right AT occlusion, otherwise non obstructive dz  5 dyslipidemia  6 prior pulmonary embolism  7 diabetic neuropathy (E11.40)     Plan    Patient is not cleared for discharge by ID.  Intravenous antibiotics ordered.    PO diuretics  Continue other home medications    Stable for DC from CV perspective -   Nurse checked with [facility], patient scheduled for echocardiogram tomorrow - was cancelled and can reschedule    Follow up with Dr. Mckoy in 2 weeks. Will sign off.  Please call if we can be of further assistance.     KENNETH Andrea

## 2023-08-10 ENCOUNTER — HOSPITAL ENCOUNTER (OUTPATIENT)
Dept: ULTRASOUND IMAGING | Facility: CLINIC | Age: 63
Discharge: HOME | End: 2023-08-10
Attending: SURGERY
Payer: COMMERCIAL

## 2023-08-10 ENCOUNTER — OFFICE VISIT (OUTPATIENT)
Dept: OTHER | Facility: CLINIC | Age: 63
End: 2023-08-10
Attending: SURGERY
Payer: COMMERCIAL

## 2023-08-10 VITALS — HEART RATE: 65 BPM | SYSTOLIC BLOOD PRESSURE: 156 MMHG | DIASTOLIC BLOOD PRESSURE: 90 MMHG

## 2023-08-10 DIAGNOSIS — I73.9 PAD (PERIPHERAL ARTERY DISEASE) (H): Primary | ICD-10-CM

## 2023-08-10 DIAGNOSIS — I73.9 PAD (PERIPHERAL ARTERY DISEASE) (H): ICD-10-CM

## 2023-08-10 DIAGNOSIS — Z98.890 HISTORY OF RIGHT-SIDED CAROTID ENDARTERECTOMY: ICD-10-CM

## 2023-08-10 PROCEDURE — G0463 HOSPITAL OUTPT CLINIC VISIT: HCPCS | Mod: 25 | Performed by: SURGERY

## 2023-08-10 PROCEDURE — 99024 POSTOP FOLLOW-UP VISIT: CPT | Performed by: SURGERY

## 2023-08-10 PROCEDURE — 93926 LOWER EXTREMITY STUDY: CPT | Mod: RT

## 2023-08-10 NOTE — PROGRESS NOTES
[facility]        Patient is here for a  follow up.      Pt is currently taking Aspirin, Statin, and Plavix.    BP (!) 156/90 (BP Location: Left arm, Patient Position: Chair, Cuff Size: Adult Regular)   Pulse 65   LMP  (LMP Unknown)     The provider has been notified that the patient has no concerns.     Questions patient would like addressed today are: N/A.    Refills are needed: N/A    Has homecare services and agency name:  Isa Al MA

## 2023-08-10 NOTE — PROGRESS NOTES
[facility]        Patient is here for a  follow up.      Pt is currently taking Aspirin, Statin, and Plavix.    BP (!) 156/90 (BP Location: Left arm, Patient Position: Chair, Cuff Size: Adult Regular)   Pulse 65   LMP  (LMP Unknown)     The provider has been notified that the patient has no concerns.     Questions patient would like addressed today are: N/A.    Refills are needed: N/A    Has homecare services and agency name:  No

## 2023-08-18 DIAGNOSIS — I10 ESSENTIAL HYPERTENSION: ICD-10-CM

## 2023-08-18 RX ORDER — CARVEDILOL 6.25 MG/1
TABLET ORAL
Qty: 180 TABLET | Refills: 0 | OUTPATIENT
Start: 2023-08-18

## 2023-08-22 DIAGNOSIS — I10 ESSENTIAL HYPERTENSION: ICD-10-CM

## 2023-08-22 RX ORDER — CARVEDILOL 6.25 MG/1
TABLET ORAL
Qty: 180 TABLET | Refills: 0 | OUTPATIENT
Start: 2023-08-22

## 2023-08-23 RX ORDER — CARVEDILOL 6.25 MG/1
TABLET ORAL
Qty: 180 TABLET | Refills: 0 | OUTPATIENT
Start: 2023-08-23

## 2023-08-27 ENCOUNTER — HEALTH MAINTENANCE LETTER (OUTPATIENT)
Age: 63
End: 2023-08-27

## 2023-08-28 NOTE — TELEPHONE ENCOUNTER
Patient calling regarding carvedilol refills. Advised her talk to pharmacy as they have refills.  Patient states she will call them back.     Oriana Farley RN on 8/28/2023 at 1:22 PM

## 2023-08-30 DIAGNOSIS — I10 ESSENTIAL HYPERTENSION: ICD-10-CM

## 2023-08-30 RX ORDER — CARVEDILOL 6.25 MG/1
TABLET ORAL
Qty: 180 TABLET | Refills: 0 | OUTPATIENT
Start: 2023-08-30

## 2023-09-14 DIAGNOSIS — I70.229 CRITICAL LOWER LIMB ISCHEMIA (H): ICD-10-CM

## 2023-09-15 ENCOUNTER — TRANSFERRED RECORDS (OUTPATIENT)
Dept: HEALTH INFORMATION MANAGEMENT | Facility: CLINIC | Age: 63
End: 2023-09-15
Payer: COMMERCIAL

## 2023-09-15 RX ORDER — GABAPENTIN 100 MG/1
100 CAPSULE ORAL 3 TIMES DAILY
Qty: 90 CAPSULE | Refills: 0 | Status: ON HOLD | OUTPATIENT
Start: 2023-09-15 | End: 2023-10-05

## 2023-09-15 NOTE — TELEPHONE ENCOUNTER
Requested Prescriptions   Pending Prescriptions Disp Refills    gabapentin (NEURONTIN) 100 MG capsule [Pharmacy Med Name: Gabapentin Oral Capsule 100 MG] 90 capsule 0     Sig: Take 1 capsule (100 mg) by mouth 3 times daily       There is no refill protocol information for this order        Routing refill request to provider for review/approval because:  Drug not on the G refill protocol

## 2023-09-22 ENCOUNTER — HOSPITAL ENCOUNTER (OUTPATIENT)
Facility: CLINIC | Age: 63
Discharge: HOME | End: 2023-09-22
Attending: PATHOLOGY | Admitting: PATHOLOGY
Payer: COMMERCIAL

## 2023-09-22 VITALS
SYSTOLIC BLOOD PRESSURE: 104 MMHG | RESPIRATION RATE: 18 BRPM | OXYGEN SATURATION: 95 % | HEART RATE: 66 BPM | DIASTOLIC BLOOD PRESSURE: 59 MMHG

## 2023-09-22 PROCEDURE — 88184 FLOWCYTOMETRY/ TC 1 MARKER: CPT | Performed by: PATHOLOGY

## 2023-09-22 PROCEDURE — 88305 TISSUE EXAM BY PATHOLOGIST: CPT | Mod: 26 | Performed by: PATHOLOGY

## 2023-09-22 PROCEDURE — 10021 FNA BX W/O IMG GDN 1ST LES: CPT | Performed by: PATHOLOGY

## 2023-09-22 PROCEDURE — 88172 CYTP DX EVAL FNA 1ST EA SITE: CPT | Mod: TC | Performed by: PATHOLOGY

## 2023-09-22 PROCEDURE — 88173 CYTOPATH EVAL FNA REPORT: CPT | Mod: 26 | Performed by: PATHOLOGY

## 2023-09-22 PROCEDURE — 88185 FLOWCYTOMETRY/TC ADD-ON: CPT | Performed by: PATHOLOGY

## 2023-09-22 PROCEDURE — 88172 CYTP DX EVAL FNA 1ST EA SITE: CPT | Mod: 26 | Performed by: PATHOLOGY

## 2023-09-22 PROCEDURE — 88189 FLOWCYTOMETRY/READ 16 & >: CPT | Mod: GC | Performed by: PATHOLOGY

## 2023-09-22 ASSESSMENT — ACTIVITIES OF DAILY LIVING (ADL): ADLS_ACUITY_SCORE: 35

## 2023-09-22 NOTE — PROCEDURES
[facility] Procedure note:    Requesting Physician:Pablito Mendoza  Performing Physician:Ortiz  Immediate interpretation:Diagnostic material was obtained    Fine needle aspiration risks (including bleeding and infection), benefits and limitations were explained to the patient and the patient gave informed consent to perform the procedure.  The patient identity and aspiration site were confirmed with the patient prior to the procedure and time out was performed.  The skin overlying the aspiration site was cleansed with alcohol, ~ 2 cc of lidocaine was administered and 4 passes of material were obtained using 23 and 25 gauge needles. Needle rinse material was collected in RPMI for Flow Cytometry and Cell Block. Patient was discharged to home without any complications.

## 2023-09-25 LAB
PATH REPORT.COMMENTS IMP SPEC: ABNORMAL
PATH REPORT.COMMENTS IMP SPEC: YES
PATH REPORT.FINAL DX SPEC: ABNORMAL
PATH REPORT.MICROSCOPIC SPEC OTHER STN: ABNORMAL
PATH REPORT.RELEVANT HX SPEC: ABNORMAL

## 2023-09-26 LAB
PATH REPORT.COMMENTS IMP SPEC: ABNORMAL
PATH REPORT.COMMENTS IMP SPEC: YES
PATH REPORT.FINAL DX SPEC: ABNORMAL
PATH REPORT.GROSS SPEC: ABNORMAL
PATH REPORT.MICROSCOPIC SPEC OTHER STN: ABNORMAL
PATH REPORT.RELEVANT HX SPEC: ABNORMAL

## 2023-09-28 ENCOUNTER — TELEPHONE (OUTPATIENT)
Dept: OTHER | Facility: CLINIC | Age: 63
End: 2023-09-28
Payer: COMMERCIAL

## 2023-09-28 NOTE — TELEPHONE ENCOUNTER
Returned call to patient.  She had not been given any direction from the ENT provider regarding holding her Plavix for the upcoming biopsy/procedure.  Advised patient length of hold is determined by the provider performing the procedure, however if needing to be held, this is ok and resume Plavix as soon as able.  Patient verbalized understanding and will reach out to the ENT clinic.  She will reach out to us if she needs any additional assistance with this.  Patient appreciated the call and had no additional questions.    Alaina Yanez, ESTHERN, RN-[facility] [place]

## 2023-09-28 NOTE — TELEPHONE ENCOUNTER
[facility]    Who is the name of the provider? Dr Lozano    What is the location you see this provider at/preferred location? [place]    Person calling / Facility: [facility]    Phone number:  [phone]    Nurse call back needed:  Y    Reason for call: Pt is having a procedure on her jaw on 10/06/23 and is wondering if she needs to go off her Plavix beforehand and if yes, how many days prior?      Pharmacy location:  N/A    Outside Imaging: N/A    Can we leave a detailed message on this number?  Y    Additional Info:

## 2023-10-03 ENCOUNTER — HOSPITAL ENCOUNTER (INPATIENT)
Facility: CLINIC | Age: 63
LOS: 11 days | Discharge: HOME | DRG: 270 | End: 2023-10-14
Attending: EMERGENCY MEDICINE | Admitting: HOSPITALIST
Payer: COMMERCIAL

## 2023-10-03 ENCOUNTER — APPOINTMENT (OUTPATIENT)
Dept: CARDIOLOGY | Facility: CLINIC | Age: 63
DRG: 270 | End: 2023-10-03
Attending: INTERNAL MEDICINE
Payer: COMMERCIAL

## 2023-10-03 ENCOUNTER — APPOINTMENT (OUTPATIENT)
Dept: GENERAL RADIOLOGY | Facility: CLINIC | Age: 63
DRG: 270 | End: 2023-10-03
Attending: EMERGENCY MEDICINE
Payer: COMMERCIAL

## 2023-10-03 DIAGNOSIS — F17.200 TOBACCO USE DISORDER: ICD-10-CM

## 2023-10-03 DIAGNOSIS — I10 ESSENTIAL HYPERTENSION: ICD-10-CM

## 2023-10-03 DIAGNOSIS — I25.10 CORONARY ARTERY DISEASE INVOLVING NATIVE CORONARY ARTERY OF NATIVE HEART WITHOUT ANGINA PECTORIS: ICD-10-CM

## 2023-10-03 DIAGNOSIS — Z98.890 CRITICAL ISCHEMIA OF EXTREMITY WITH HISTORY OF REVASCULARIZATION OF SAME EXTREMITY (H): ICD-10-CM

## 2023-10-03 DIAGNOSIS — I21.4 NSTEMI (NON-ST ELEVATED MYOCARDIAL INFARCTION) (H): ICD-10-CM

## 2023-10-03 DIAGNOSIS — I73.9 PERIPHERAL VASCULAR DISEASE (H): ICD-10-CM

## 2023-10-03 DIAGNOSIS — K59.00 CONSTIPATION, UNSPECIFIED CONSTIPATION TYPE: ICD-10-CM

## 2023-10-03 DIAGNOSIS — E11.51 TYPE 2 DIABETES MELLITUS WITH DIABETIC PERIPHERAL ANGIOPATHY WITHOUT GANGRENE, WITHOUT LONG-TERM CURRENT USE OF INSULIN (H): ICD-10-CM

## 2023-10-03 DIAGNOSIS — F43.0 ACUTE REACTION TO SITUATIONAL STRESS: ICD-10-CM

## 2023-10-03 DIAGNOSIS — I70.391 ATHEROSCLEROSIS OF BYPASS GRAFT OF RIGHT LOWER EXTREMITY WITH OTHER CLINICAL MANIFESTATION (H): ICD-10-CM

## 2023-10-03 DIAGNOSIS — M15.0 PRIMARY OSTEOARTHRITIS INVOLVING MULTIPLE JOINTS: ICD-10-CM

## 2023-10-03 DIAGNOSIS — F41.9 ANXIETY: ICD-10-CM

## 2023-10-03 DIAGNOSIS — E78.5 HYPERLIPIDEMIA LDL GOAL <70: ICD-10-CM

## 2023-10-03 DIAGNOSIS — I70.229 CRITICAL ISCHEMIA OF EXTREMITY WITH HISTORY OF REVASCULARIZATION OF SAME EXTREMITY (H): ICD-10-CM

## 2023-10-03 DIAGNOSIS — I51.81 TAKOTSUBO CARDIOMYOPATHY: Primary | ICD-10-CM

## 2023-10-03 DIAGNOSIS — I70.229 CRITICAL LOWER LIMB ISCHEMIA (H): ICD-10-CM

## 2023-10-03 LAB
ALBUMIN SERPL BCG-MCNC: 4.4 G/DL (ref 3.5–5.2)
ALP SERPL-CCNC: 82 U/L (ref 35–104)
ALT SERPL W P-5'-P-CCNC: 22 U/L (ref 0–50)
ANION GAP SERPL CALCULATED.3IONS-SCNC: 14 MMOL/L (ref 7–15)
AST SERPL W P-5'-P-CCNC: 25 U/L (ref 0–45)
ATRIAL RATE - MUSE: 80 BPM
ATRIAL RATE - MUSE: 92 BPM
BASO+EOS+MONOS # BLD AUTO: NORMAL 10*3/UL
BASO+EOS+MONOS NFR BLD AUTO: NORMAL %
BASOPHILS # BLD AUTO: 0.1 10E3/UL (ref 0–0.2)
BASOPHILS NFR BLD AUTO: 1 %
BILIRUB SERPL-MCNC: 0.9 MG/DL
BUN SERPL-MCNC: 17.5 MG/DL (ref 8–23)
CALCIUM SERPL-MCNC: 9.7 MG/DL (ref 8.8–10.2)
CHLORIDE SERPL-SCNC: 95 MMOL/L (ref 98–107)
CHOLEST SERPL-MCNC: 137 MG/DL
COHGB MFR BLD: 6.4 % (ref 0–2)
CREAT BLD-MCNC: 0.9 MG/DL (ref 0.5–1)
CREAT SERPL-MCNC: 0.77 MG/DL (ref 0.51–0.95)
DEPRECATED HCO3 PLAS-SCNC: 22 MMOL/L (ref 22–29)
DIASTOLIC BLOOD PRESSURE - MUSE: NORMAL MMHG
DIASTOLIC BLOOD PRESSURE - MUSE: NORMAL MMHG
EGFRCR SERPLBLD CKD-EPI 2021: 86 ML/MIN/1.73M2
EGFRCR SERPLBLD CKD-EPI 2021: >60 ML/MIN/1.73M2
EOSINOPHIL # BLD AUTO: 0.1 10E3/UL (ref 0–0.7)
EOSINOPHIL NFR BLD AUTO: 1 %
ERYTHROCYTE [DISTWIDTH] IN BLOOD BY AUTOMATED COUNT: 14.5 % (ref 10–15)
ERYTHROCYTE [DISTWIDTH] IN BLOOD BY AUTOMATED COUNT: 14.5 % (ref 10–15)
GLUCOSE SERPL-MCNC: 101 MG/DL (ref 70–99)
HCO3 BLDV-SCNC: 23 MMOL/L (ref 21–28)
HCT VFR BLD AUTO: 39.1 % (ref 35–47)
HCT VFR BLD AUTO: 42.3 % (ref 35–47)
HDLC SERPL-MCNC: 54 MG/DL
HGB BLD-MCNC: 12.9 G/DL (ref 11.7–15.7)
HGB BLD-MCNC: 14 G/DL (ref 11.7–15.7)
HOLD SPECIMEN: NORMAL
HOLD SPECIMEN: NORMAL
IMM GRANULOCYTES # BLD: 0 10E3/UL
IMM GRANULOCYTES NFR BLD: 0 %
INTERPRETATION ECG - MUSE: NORMAL
INTERPRETATION ECG - MUSE: NORMAL
LACTATE BLD-SCNC: 1 MMOL/L
LDLC SERPL CALC-MCNC: 69 MG/DL
LVEF ECHO: NORMAL
LYMPHOCYTES # BLD AUTO: 0.8 10E3/UL (ref 0.8–5.3)
LYMPHOCYTES NFR BLD AUTO: 8 %
MAGNESIUM SERPL-MCNC: 1.7 MG/DL (ref 1.7–2.3)
MCH RBC QN AUTO: 28 PG (ref 26.5–33)
MCH RBC QN AUTO: 28.1 PG (ref 26.5–33)
MCHC RBC AUTO-ENTMCNC: 33 G/DL (ref 31.5–36.5)
MCHC RBC AUTO-ENTMCNC: 33.1 G/DL (ref 31.5–36.5)
MCV RBC AUTO: 85 FL (ref 78–100)
MCV RBC AUTO: 85 FL (ref 78–100)
MONOCYTES # BLD AUTO: 0.6 10E3/UL (ref 0–1.3)
MONOCYTES NFR BLD AUTO: 7 %
NEUTROPHILS # BLD AUTO: 7.9 10E3/UL (ref 1.6–8.3)
NEUTROPHILS NFR BLD AUTO: 83 %
NONHDLC SERPL-MCNC: 83 MG/DL
NRBC # BLD AUTO: 0 10E3/UL
NRBC BLD AUTO-RTO: 0 /100
P AXIS - MUSE: 30 DEGREES
P AXIS - MUSE: 47 DEGREES
PCO2 BLDV: 39 MM HG (ref 40–50)
PH BLDV: 7.38 [PH] (ref 7.32–7.43)
PLATELET # BLD AUTO: 213 10E3/UL (ref 150–450)
PLATELET # BLD AUTO: 226 10E3/UL (ref 150–450)
PO2 BLDV: 26 MM HG (ref 25–47)
POTASSIUM SERPL-SCNC: 4.4 MMOL/L (ref 3.4–5.3)
PR INTERVAL - MUSE: 138 MS
PR INTERVAL - MUSE: 146 MS
PROT SERPL-MCNC: 7.7 G/DL (ref 6.4–8.3)
QRS DURATION - MUSE: 82 MS
QRS DURATION - MUSE: 94 MS
QT - MUSE: 344 MS
QT - MUSE: 372 MS
QTC - MUSE: 425 MS
QTC - MUSE: 429 MS
R AXIS - MUSE: 48 DEGREES
R AXIS - MUSE: 58 DEGREES
RBC # BLD AUTO: 4.61 10E6/UL (ref 3.8–5.2)
RBC # BLD AUTO: 4.98 10E6/UL (ref 3.8–5.2)
SAO2 % BLDV: 47 % (ref 94–100)
SODIUM SERPL-SCNC: 131 MMOL/L (ref 135–145)
SYSTOLIC BLOOD PRESSURE - MUSE: NORMAL MMHG
SYSTOLIC BLOOD PRESSURE - MUSE: NORMAL MMHG
T AXIS - MUSE: 67 DEGREES
T AXIS - MUSE: 68 DEGREES
TRIGL SERPL-MCNC: 68 MG/DL
TROPONIN T SERPL HS-MCNC: 294 NG/L
TROPONIN T SERPL HS-MCNC: 483 NG/L
TROPONIN T SERPL HS-MCNC: 609 NG/L
UFH PPP CHRO-ACNC: 0.64 IU/ML
VENTRICULAR RATE- MUSE: 80 BPM
VENTRICULAR RATE- MUSE: 92 BPM
WBC # BLD AUTO: 8.8 10E3/UL (ref 4–11)
WBC # BLD AUTO: 9.6 10E3/UL (ref 4–11)

## 2023-10-03 PROCEDURE — 93306 TTE W/DOPPLER COMPLETE: CPT | Mod: 26 | Performed by: INTERNAL MEDICINE

## 2023-10-03 PROCEDURE — 93005 ELECTROCARDIOGRAM TRACING: CPT

## 2023-10-03 PROCEDURE — 84484 ASSAY OF TROPONIN QUANT: CPT | Performed by: EMERGENCY MEDICINE

## 2023-10-03 PROCEDURE — 99285 EMERGENCY DEPT VISIT HI MDM: CPT

## 2023-10-03 PROCEDURE — 999N000208 ECHOCARDIOGRAM COMPLETE

## 2023-10-03 PROCEDURE — 82565 ASSAY OF CREATININE: CPT

## 2023-10-03 PROCEDURE — 250N000013 HC RX MED GY IP 250 OP 250 PS 637

## 2023-10-03 PROCEDURE — 93005 ELECTROCARDIOGRAM TRACING: CPT | Mod: 76

## 2023-10-03 PROCEDURE — 250N000011 HC RX IP 250 OP 636: Mod: JZ | Performed by: EMERGENCY MEDICINE

## 2023-10-03 PROCEDURE — 36415 COLL VENOUS BLD VENIPUNCTURE: CPT | Performed by: EMERGENCY MEDICINE

## 2023-10-03 PROCEDURE — 36415 COLL VENOUS BLD VENIPUNCTURE: CPT | Performed by: INTERNAL MEDICINE

## 2023-10-03 PROCEDURE — 85014 HEMATOCRIT: CPT | Performed by: EMERGENCY MEDICINE

## 2023-10-03 PROCEDURE — 80053 COMPREHEN METABOLIC PANEL: CPT | Performed by: EMERGENCY MEDICINE

## 2023-10-03 PROCEDURE — 85025 COMPLETE CBC W/AUTO DIFF WBC: CPT | Performed by: EMERGENCY MEDICINE

## 2023-10-03 PROCEDURE — 84484 ASSAY OF TROPONIN QUANT: CPT | Performed by: INTERNAL MEDICINE

## 2023-10-03 PROCEDURE — 99223 1ST HOSP IP/OBS HIGH 75: CPT | Performed by: INTERNAL MEDICINE

## 2023-10-03 PROCEDURE — 80061 LIPID PANEL: CPT

## 2023-10-03 PROCEDURE — 250N000013 HC RX MED GY IP 250 OP 250 PS 637: Performed by: HOSPITALIST

## 2023-10-03 PROCEDURE — 210N000002 HC R&B HEART CARE

## 2023-10-03 PROCEDURE — 83735 ASSAY OF MAGNESIUM: CPT

## 2023-10-03 PROCEDURE — 82375 ASSAY CARBOXYHB QUANT: CPT | Performed by: EMERGENCY MEDICINE

## 2023-10-03 PROCEDURE — 71046 X-RAY EXAM CHEST 2 VIEWS: CPT

## 2023-10-03 PROCEDURE — 85520 HEPARIN ASSAY: CPT | Performed by: INTERNAL MEDICINE

## 2023-10-03 PROCEDURE — 99223 1ST HOSP IP/OBS HIGH 75: CPT | Mod: AI

## 2023-10-03 PROCEDURE — 82803 BLOOD GASES ANY COMBINATION: CPT

## 2023-10-03 PROCEDURE — 85520 HEPARIN ASSAY: CPT | Performed by: EMERGENCY MEDICINE

## 2023-10-03 PROCEDURE — 255N000002 HC RX 255 OP 636: Performed by: EMERGENCY MEDICINE

## 2023-10-03 RX ORDER — ROSUVASTATIN CALCIUM 20 MG/1
40 TABLET, COATED ORAL AT BEDTIME
Status: DISCONTINUED | OUTPATIENT
Start: 2023-10-03 | End: 2023-10-14 | Stop reason: HOSPADM

## 2023-10-03 RX ORDER — PREDNISONE 50 MG/1
50 TABLET ORAL ONCE
Status: COMPLETED | OUTPATIENT
Start: 2023-10-04 | End: 2023-10-04

## 2023-10-03 RX ORDER — NICOTINE POLACRILEX 4 MG
15-30 LOZENGE BUCCAL
Status: DISCONTINUED | OUTPATIENT
Start: 2023-10-03 | End: 2023-10-14 | Stop reason: HOSPADM

## 2023-10-03 RX ORDER — TRIAMCINOLONE ACETONIDE 0.25 MG/G
CREAM TOPICAL 2 TIMES DAILY
Status: DISCONTINUED | OUTPATIENT
Start: 2023-10-03 | End: 2023-10-14 | Stop reason: HOSPADM

## 2023-10-03 RX ORDER — NITROGLYCERIN 0.4 MG/1
0.4 TABLET SUBLINGUAL EVERY 5 MIN PRN
Status: DISCONTINUED | OUTPATIENT
Start: 2023-10-03 | End: 2023-10-14 | Stop reason: HOSPADM

## 2023-10-03 RX ORDER — FLUTICASONE FUROATE AND VILANTEROL 200; 25 UG/1; UG/1
1 POWDER RESPIRATORY (INHALATION) DAILY
Status: DISCONTINUED | OUTPATIENT
Start: 2023-10-03 | End: 2023-10-14 | Stop reason: HOSPADM

## 2023-10-03 RX ORDER — LISINOPRIL 20 MG/1
20 TABLET ORAL 2 TIMES DAILY
Status: DISCONTINUED | OUTPATIENT
Start: 2023-10-03 | End: 2023-10-07

## 2023-10-03 RX ORDER — AMLODIPINE BESYLATE 5 MG/1
5 TABLET ORAL 2 TIMES DAILY
Status: DISCONTINUED | OUTPATIENT
Start: 2023-10-03 | End: 2023-10-12

## 2023-10-03 RX ORDER — ONDANSETRON 4 MG/1
4 TABLET, ORALLY DISINTEGRATING ORAL EVERY 6 HOURS PRN
Status: DISCONTINUED | OUTPATIENT
Start: 2023-10-03 | End: 2023-10-05

## 2023-10-03 RX ORDER — DIPHENHYDRAMINE HCL 25 MG
50 CAPSULE ORAL ONCE
Status: COMPLETED | OUTPATIENT
Start: 2023-10-04 | End: 2023-10-04

## 2023-10-03 RX ORDER — TRIAMCINOLONE ACETONIDE 1 MG/G
OINTMENT TOPICAL 2 TIMES DAILY PRN
COMMUNITY
End: 2024-01-08

## 2023-10-03 RX ORDER — HEPARIN SODIUM 10000 [USP'U]/100ML
0-5000 INJECTION, SOLUTION INTRAVENOUS CONTINUOUS
Status: DISCONTINUED | OUTPATIENT
Start: 2023-10-03 | End: 2023-10-04

## 2023-10-03 RX ORDER — NICOTINE 21 MG/24HR
1 PATCH, TRANSDERMAL 24 HOURS TRANSDERMAL DAILY
Status: DISCONTINUED | OUTPATIENT
Start: 2023-10-03 | End: 2023-10-14 | Stop reason: HOSPADM

## 2023-10-03 RX ORDER — DEXTROSE MONOHYDRATE 25 G/50ML
25-50 INJECTION, SOLUTION INTRAVENOUS
Status: DISCONTINUED | OUTPATIENT
Start: 2023-10-03 | End: 2023-10-14 | Stop reason: HOSPADM

## 2023-10-03 RX ORDER — ASPIRIN 81 MG/1
81 TABLET, CHEWABLE ORAL DAILY
Status: CANCELLED | OUTPATIENT
Start: 2023-10-03

## 2023-10-03 RX ORDER — GABAPENTIN 100 MG/1
100 CAPSULE ORAL 3 TIMES DAILY
Status: DISCONTINUED | OUTPATIENT
Start: 2023-10-03 | End: 2023-10-14 | Stop reason: HOSPADM

## 2023-10-03 RX ORDER — CARVEDILOL 6.25 MG/1
6.25 TABLET ORAL 2 TIMES DAILY WITH MEALS
Status: DISCONTINUED | OUTPATIENT
Start: 2023-10-03 | End: 2023-10-10

## 2023-10-03 RX ORDER — LANOLIN ALCOHOL/MO/W.PET/CERES
3 CREAM (GRAM) TOPICAL
Status: DISCONTINUED | OUTPATIENT
Start: 2023-10-03 | End: 2023-10-14 | Stop reason: HOSPADM

## 2023-10-03 RX ORDER — ACETAMINOPHEN 325 MG/1
650 TABLET ORAL EVERY 6 HOURS PRN
Status: DISCONTINUED | OUTPATIENT
Start: 2023-10-03 | End: 2023-10-04

## 2023-10-03 RX ORDER — ONDANSETRON 2 MG/ML
4 INJECTION INTRAMUSCULAR; INTRAVENOUS EVERY 6 HOURS PRN
Status: DISCONTINUED | OUTPATIENT
Start: 2023-10-03 | End: 2023-10-05

## 2023-10-03 RX ORDER — CALCIUM CARBONATE/VITAMIN D3 600 MG-10
1 TABLET ORAL 2 TIMES DAILY WITH MEALS
Status: DISCONTINUED | OUTPATIENT
Start: 2023-10-03 | End: 2023-10-14 | Stop reason: HOSPADM

## 2023-10-03 RX ORDER — BETAMETHASONE DIPROPIONATE 0.5 MG/G
CREAM TOPICAL 2 TIMES DAILY PRN
COMMUNITY
Start: 2023-09-15 | End: 2023-11-13

## 2023-10-03 RX ORDER — ACETAMINOPHEN 650 MG/1
650 SUPPOSITORY RECTAL EVERY 6 HOURS PRN
Status: DISCONTINUED | OUTPATIENT
Start: 2023-10-03 | End: 2023-10-14 | Stop reason: HOSPADM

## 2023-10-03 RX ORDER — CLOPIDOGREL BISULFATE 75 MG/1
75 TABLET ORAL DAILY
Status: DISCONTINUED | OUTPATIENT
Start: 2023-10-03 | End: 2023-10-06

## 2023-10-03 RX ORDER — ASPIRIN 81 MG/1
81 TABLET, CHEWABLE ORAL DAILY
Status: DISCONTINUED | OUTPATIENT
Start: 2023-10-03 | End: 2023-10-12

## 2023-10-03 RX ORDER — AMOXICILLIN 250 MG
1 CAPSULE ORAL DAILY PRN
Status: DISCONTINUED | OUTPATIENT
Start: 2023-10-03 | End: 2023-10-14 | Stop reason: HOSPADM

## 2023-10-03 RX ADMIN — AMLODIPINE BESYLATE 5 MG: 5 TABLET ORAL at 21:00

## 2023-10-03 RX ADMIN — HUMAN ALBUMIN MICROSPHERES AND PERFLUTREN 3 ML: 10; .22 INJECTION, SOLUTION INTRAVENOUS at 11:34

## 2023-10-03 RX ADMIN — CARVEDILOL 6.25 MG: 6.25 TABLET, FILM COATED ORAL at 12:22

## 2023-10-03 RX ADMIN — NICOTINE 1 PATCH: 14 PATCH, EXTENDED RELEASE TRANSDERMAL at 13:47

## 2023-10-03 RX ADMIN — CARVEDILOL 6.25 MG: 6.25 TABLET, FILM COATED ORAL at 21:01

## 2023-10-03 RX ADMIN — SENNOSIDES AND DOCUSATE SODIUM 1 TABLET: 50; 8.6 TABLET ORAL at 18:05

## 2023-10-03 RX ADMIN — LISINOPRIL 20 MG: 20 TABLET ORAL at 21:00

## 2023-10-03 RX ADMIN — AMLODIPINE BESYLATE 5 MG: 5 TABLET ORAL at 12:22

## 2023-10-03 RX ADMIN — LISINOPRIL 20 MG: 20 TABLET ORAL at 12:22

## 2023-10-03 RX ADMIN — ROSUVASTATIN CALCIUM 40 MG: 20 TABLET, FILM COATED ORAL at 21:00

## 2023-10-03 RX ADMIN — OMEPRAZOLE 20 MG: 20 CAPSULE, DELAYED RELEASE ORAL at 13:47

## 2023-10-03 RX ADMIN — GABAPENTIN 100 MG: 100 CAPSULE ORAL at 18:05

## 2023-10-03 RX ADMIN — Medication 1 TABLET: at 13:47

## 2023-10-03 RX ADMIN — TRIAMCINOLONE ACETONIDE: 0.25 CREAM TOPICAL at 22:40

## 2023-10-03 RX ADMIN — GABAPENTIN 100 MG: 100 CAPSULE ORAL at 12:32

## 2023-10-03 RX ADMIN — HEPARIN SODIUM 650 UNITS/HR: 10000 INJECTION, SOLUTION INTRAVENOUS at 11:17

## 2023-10-03 RX ADMIN — ACETAMINOPHEN 650 MG: 325 TABLET, FILM COATED ORAL at 21:01

## 2023-10-03 RX ADMIN — Medication 1 TABLET: at 21:01

## 2023-10-03 RX ADMIN — GABAPENTIN 100 MG: 100 CAPSULE ORAL at 21:01

## 2023-10-03 RX ADMIN — FLUTICASONE FUROATE AND VILANTEROL TRIFENATATE 1 PUFF: 200; 25 POWDER RESPIRATORY (INHALATION) at 13:52

## 2023-10-03 ASSESSMENT — ACTIVITIES OF DAILY LIVING (ADL)
ADLS_ACUITY_SCORE: 35

## 2023-10-03 NOTE — PROVIDER NOTIFICATION
MD Notification    Notified Person: MD    Notified Person Name: Rosa Elena Emanuel     Notification Date/Time: 10/3 1447    Notification Interaction: in person conversation     Purpose of Notification: Pt very concerned with talking plavix while also on heparin and ASA. Has history of bleeding ~4 years ago during procedure.     Orders Received: No contraindication of giving plavix. Cards wants her to have plavix. If she refuses, that is her right to do so.     Comments:

## 2023-10-03 NOTE — ED NOTES
Bed: ST01  Expected date:   Expected time:   Means of arrival:   Comments:  Yanet - 520 - 62 F STEMI eta 5426
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
## 2023-10-03 NOTE — PROVIDER NOTIFICATION
MD Notification    Notified Person: NP    Notified Person Name:Chaim Phillips    Notification Date/Time: 10/3/23 @ 4366    Notification Interaction:vocera message    Purpose of Notification: Patient wants a stool softener. Has melatonin but wants something stronger.    Orders Received:   yes    Comments:

## 2023-10-03 NOTE — ED NOTES
Patient requesting PIV to be taken out due to pain. PIV removed. Unable to obtain another PIV access. 2nd RN to attempt with ultrasound.

## 2023-10-03 NOTE — PROGRESS NOTES
RECEIVING UNIT ED HANDOFF REVIEW    ED Nurse Handoff Report was reviewed by: Cassandra Robertson RN on October 3, 2023 at 11:25 AM
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
## 2023-10-03 NOTE — PLAN OF CARE
Goal Outcome Evaluation:  A&O x 4. VSS, on room air. Denies chest pain, no palpitations, no shortness of breath. Up with SBA.  Heparin drip at 450 unit/h. Next check at 1200. NPO at midnight. Plan for angiogram. Tele SR

## 2023-10-03 NOTE — PHARMACY-ADMISSION MEDICATION HISTORY
Pharmacist Admission Medication History    Admission medication history is complete. The information provided in this note is only as accurate as the sources available at the time of the update.    Information Source(s): Patient and CareEverywhere/SureScripts via in-person    Pertinent Information: Patient requests refills on carvedilol. She states her primary provider said he sent refills to the pharmacy but the pharmacy said they have not received the refills yet    Changes made to PTA medication list:  Added: betamethasone, triamcinolone  Deleted: None  Changed: None    Allergies reviewed with patient and updates made in EHR: no    Medication History Completed By: Akilah Brownlee RP 10/3/2023 11:06 AM    PTA Med List   Medication Sig Note Last Dose    acetaminophen (TYLENOL) 500 MG tablet Take 500-1,000 mg by mouth every 6 hours as needed for mild pain  Unknown at APRN    amLODIPine (NORVASC) 5 MG tablet Take 1 tablet (5 mg) by mouth 2 times daily  10/2/2023    aspirin (ASA) 81 MG chewable tablet Take 1 tablet (81 mg) by mouth daily  10/2/2023    augmented betamethasone dipropionate (DIPROLENE AF) 0.05 % external cream Apply topically 2 times daily as needed (ear itching)  Unknown at PRN    BREO ELLIPTA 200-25 MCG/ACT inhaler Inhale 1 puff into the lungs daily  10/2/2023    Calcium Carb-Cholecalciferol (CALCIUM CARBONATE-VITAMIN D3) 600-400 MG-UNIT TABS TAKE ONE TABLET BY MOUTH TWICE DAILY  10/2/2023    carvedilol (COREG) 6.25 MG tablet TAKE ONE TABLET BY MOUTH TWICE A DAY WITH MEALS 10/3/2023: Ran out 2-3 months ago and does not have any refills More than a month    clopidogrel (PLAVIX) 75 MG tablet Take 1 tablet (75 mg) by mouth daily  10/2/2023    diphenhydrAMINE (BENADRYL) 25 MG tablet take Benadryl 25-50 mg one hour prior to the procedure  Unknown at PRN    gabapentin (NEURONTIN) 100 MG capsule Take 1 capsule (100 mg) by mouth 3 times daily  10/2/2023    lisinopril (ZESTRIL) 20 MG tablet Take 1 tablet (20 mg) by  mouth 2 times daily.  10/2/2023    nitroGLYcerin (NITROSTAT) 0.4 MG sublingual tablet Place 1 tablet (0.4 mg) under the tongue See Admin Instructions for chest pain  Unknown at PRN    omeprazole (PRILOSEC) 20 MG DR capsule TAKE ONE CAPSULE BY MOUTH DAILY  10/2/2023    rosuvastatin (CRESTOR) 40 MG tablet Take 1 tablet (40 mg) by mouth At Bedtime  10/2/2023    denosumab (PROLIA) 60 MG/ML SOLN injection Inject 1 mL (60 mg) Subcutaneous every 6 months INDICATION: TO TREAT OSTEOPOROSIS

## 2023-10-03 NOTE — PROVIDER NOTIFICATION
MD Notification    Notified Person: MD    Notified Person Name: Marlon Blake    Notification Date/Time:10/3/23 @ 1650    Notification Interaction:vocCityNews message    Purpose of Notification:  Latest troponin 609. Do we need another check?    Orders Received:   No need    Comments:
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
## 2023-10-03 NOTE — PROVIDER NOTIFICATION
MD Notification    Notified Person: MD    Notified Person Name:Rosa Elena Hamlin    Notification Date/Time:10/3/23 @ 1435    Notification Interaction:verbal    Purpose of Notification:  Ask if patient can eat    Orders Received:yes, patient can eat    Comments:
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
## 2023-10-04 ENCOUNTER — APPOINTMENT (OUTPATIENT)
Dept: ULTRASOUND IMAGING | Facility: CLINIC | Age: 63
DRG: 270 | End: 2023-10-04
Payer: COMMERCIAL

## 2023-10-04 LAB
ACT BLD: 263 SECONDS (ref 74–150)
ANION GAP SERPL CALCULATED.3IONS-SCNC: 14 MMOL/L (ref 7–15)
BUN SERPL-MCNC: 17 MG/DL (ref 8–23)
CALCIUM SERPL-MCNC: 9.1 MG/DL (ref 8.8–10.2)
CHLORIDE SERPL-SCNC: 98 MMOL/L (ref 98–107)
CREAT SERPL-MCNC: 0.73 MG/DL (ref 0.51–0.95)
DEPRECATED HCO3 PLAS-SCNC: 20 MMOL/L (ref 22–29)
EGFRCR SERPLBLD CKD-EPI 2021: >90 ML/MIN/1.73M2
ERYTHROCYTE [DISTWIDTH] IN BLOOD BY AUTOMATED COUNT: 14.4 % (ref 10–15)
GLUCOSE SERPL-MCNC: 132 MG/DL (ref 70–99)
HCT VFR BLD AUTO: 40.5 % (ref 35–47)
HGB BLD-MCNC: 13.4 G/DL (ref 11.7–15.7)
MAGNESIUM SERPL-MCNC: 1.9 MG/DL (ref 1.7–2.3)
MCH RBC QN AUTO: 28.2 PG (ref 26.5–33)
MCHC RBC AUTO-ENTMCNC: 33.1 G/DL (ref 31.5–36.5)
MCV RBC AUTO: 85 FL (ref 78–100)
PLATELET # BLD AUTO: 215 10E3/UL (ref 150–450)
POTASSIUM SERPL-SCNC: 5 MMOL/L (ref 3.4–5.3)
RBC # BLD AUTO: 4.75 10E6/UL (ref 3.8–5.2)
SODIUM SERPL-SCNC: 132 MMOL/L (ref 135–145)
TROPONIN T SERPL HS-MCNC: 213 NG/L
UFH PPP CHRO-ACNC: 0.1 IU/ML
UFH PPP CHRO-ACNC: 0.38 IU/ML
WBC # BLD AUTO: 5.1 10E3/UL (ref 4–11)

## 2023-10-04 PROCEDURE — 99207 PR SC NO CHARGE VISIT: CPT | Performed by: INTERNAL MEDICINE

## 2023-10-04 PROCEDURE — 250N000009 HC RX 250: Performed by: INTERNAL MEDICINE

## 2023-10-04 PROCEDURE — 250N000013 HC RX MED GY IP 250 OP 250 PS 637

## 2023-10-04 PROCEDURE — 258N000003 HC RX IP 258 OP 636: Performed by: INTERNAL MEDICINE

## 2023-10-04 PROCEDURE — C1894 INTRO/SHEATH, NON-LASER: HCPCS | Performed by: INTERNAL MEDICINE

## 2023-10-04 PROCEDURE — 93454 CORONARY ARTERY ANGIO S&I: CPT | Mod: 26 | Performed by: INTERNAL MEDICINE

## 2023-10-04 PROCEDURE — 99153 MOD SED SAME PHYS/QHP EA: CPT | Performed by: INTERNAL MEDICINE

## 2023-10-04 PROCEDURE — 93454 CORONARY ARTERY ANGIO S&I: CPT | Performed by: INTERNAL MEDICINE

## 2023-10-04 PROCEDURE — 250N000013 HC RX MED GY IP 250 OP 250 PS 637: Performed by: INTERNAL MEDICINE

## 2023-10-04 PROCEDURE — C1769 GUIDE WIRE: HCPCS | Performed by: INTERNAL MEDICINE

## 2023-10-04 PROCEDURE — 99233 SBSQ HOSP IP/OBS HIGH 50: CPT | Mod: 25 | Performed by: INTERNAL MEDICINE

## 2023-10-04 PROCEDURE — 36415 COLL VENOUS BLD VENIPUNCTURE: CPT | Performed by: HOSPITALIST

## 2023-10-04 PROCEDURE — 93926 LOWER EXTREMITY STUDY: CPT | Mod: RT

## 2023-10-04 PROCEDURE — 99152 MOD SED SAME PHYS/QHP 5/>YRS: CPT | Mod: GC | Performed by: INTERNAL MEDICINE

## 2023-10-04 PROCEDURE — 250N000012 HC RX MED GY IP 250 OP 636 PS 637: Performed by: INTERNAL MEDICINE

## 2023-10-04 PROCEDURE — 80048 BASIC METABOLIC PNL TOTAL CA: CPT

## 2023-10-04 PROCEDURE — 93571 IV DOP VEL&/PRESS C FLO 1ST: CPT | Mod: 26 | Performed by: INTERNAL MEDICINE

## 2023-10-04 PROCEDURE — 210N000002 HC R&B HEART CARE

## 2023-10-04 PROCEDURE — 85027 COMPLETE CBC AUTOMATED: CPT

## 2023-10-04 PROCEDURE — 250N000011 HC RX IP 250 OP 636: Performed by: INTERNAL MEDICINE

## 2023-10-04 PROCEDURE — 82803 BLOOD GASES ANY COMBINATION: CPT

## 2023-10-04 PROCEDURE — B2111ZZ FLUOROSCOPY OF MULTIPLE CORONARY ARTERIES USING LOW OSMOLAR CONTRAST: ICD-10-PCS | Performed by: INTERNAL MEDICINE

## 2023-10-04 PROCEDURE — 250N000012 HC RX MED GY IP 250 OP 636 PS 637: Performed by: STUDENT IN AN ORGANIZED HEALTH CARE EDUCATION/TRAINING PROGRAM

## 2023-10-04 PROCEDURE — 4A033BC MEASUREMENT OF ARTERIAL PRESSURE, CORONARY, PERCUTANEOUS APPROACH: ICD-10-PCS | Performed by: INTERNAL MEDICINE

## 2023-10-04 PROCEDURE — 99233 SBSQ HOSP IP/OBS HIGH 50: CPT | Performed by: INTERNAL MEDICINE

## 2023-10-04 PROCEDURE — 83605 ASSAY OF LACTIC ACID: CPT

## 2023-10-04 PROCEDURE — 83735 ASSAY OF MAGNESIUM: CPT | Performed by: INTERNAL MEDICINE

## 2023-10-04 PROCEDURE — 85520 HEPARIN ASSAY: CPT | Performed by: HOSPITALIST

## 2023-10-04 PROCEDURE — 999N000128 HC STATISTIC PERIPHERAL IV START W/O US GUIDANCE

## 2023-10-04 PROCEDURE — 272N000001 HC OR GENERAL SUPPLY STERILE: Performed by: INTERNAL MEDICINE

## 2023-10-04 PROCEDURE — 99152 MOD SED SAME PHYS/QHP 5/>YRS: CPT | Performed by: INTERNAL MEDICINE

## 2023-10-04 PROCEDURE — 99222 1ST HOSP IP/OBS MODERATE 55: CPT | Mod: GC | Performed by: SURGERY

## 2023-10-04 PROCEDURE — 250N000013 HC RX MED GY IP 250 OP 250 PS 637: Performed by: HOSPITALIST

## 2023-10-04 PROCEDURE — 93799 UNLISTED CV SVC/PROCEDURE: CPT | Mod: RC | Performed by: INTERNAL MEDICINE

## 2023-10-04 PROCEDURE — 250N000011 HC RX IP 250 OP 636: Mod: JZ | Performed by: INTERNAL MEDICINE

## 2023-10-04 PROCEDURE — C1887 CATHETER, GUIDING: HCPCS | Performed by: INTERNAL MEDICINE

## 2023-10-04 PROCEDURE — 84484 ASSAY OF TROPONIN QUANT: CPT | Performed by: INTERNAL MEDICINE

## 2023-10-04 PROCEDURE — 85347 COAGULATION TIME ACTIVATED: CPT

## 2023-10-04 RX ORDER — SODIUM CHLORIDE 9 MG/ML
INJECTION, SOLUTION INTRAVENOUS CONTINUOUS
Status: DISCONTINUED | OUTPATIENT
Start: 2023-10-04 | End: 2023-10-04 | Stop reason: HOSPADM

## 2023-10-04 RX ORDER — ATROPINE SULFATE 0.1 MG/ML
0.5 INJECTION INTRAVENOUS
Status: ACTIVE | OUTPATIENT
Start: 2023-10-04 | End: 2023-10-04

## 2023-10-04 RX ORDER — OXYCODONE HYDROCHLORIDE 5 MG/1
10 TABLET ORAL EVERY 4 HOURS PRN
Status: DISCONTINUED | OUTPATIENT
Start: 2023-10-04 | End: 2023-10-14 | Stop reason: HOSPADM

## 2023-10-04 RX ORDER — NALOXONE HYDROCHLORIDE 0.4 MG/ML
0.2 INJECTION, SOLUTION INTRAMUSCULAR; INTRAVENOUS; SUBCUTANEOUS
Status: ACTIVE | OUTPATIENT
Start: 2023-10-04 | End: 2023-10-04

## 2023-10-04 RX ORDER — CLOPIDOGREL BISULFATE 75 MG/1
75 TABLET ORAL DAILY
Qty: 90 TABLET | Refills: 3 | Status: ON HOLD | OUTPATIENT
Start: 2023-10-04 | End: 2024-06-07

## 2023-10-04 RX ORDER — FENTANYL CITRATE 50 UG/ML
25 INJECTION, SOLUTION INTRAMUSCULAR; INTRAVENOUS
Status: DISCONTINUED | OUTPATIENT
Start: 2023-10-04 | End: 2023-10-06

## 2023-10-04 RX ORDER — VERAPAMIL HYDROCHLORIDE 2.5 MG/ML
INJECTION, SOLUTION INTRAVENOUS
Status: DISCONTINUED | OUTPATIENT
Start: 2023-10-04 | End: 2023-10-04 | Stop reason: HOSPADM

## 2023-10-04 RX ORDER — FENTANYL CITRATE 50 UG/ML
25 INJECTION, SOLUTION INTRAMUSCULAR; INTRAVENOUS
Status: DISCONTINUED | OUTPATIENT
Start: 2023-10-04 | End: 2023-10-04

## 2023-10-04 RX ORDER — ASPIRIN 81 MG/1
243 TABLET, CHEWABLE ORAL ONCE
Status: COMPLETED | OUTPATIENT
Start: 2023-10-04 | End: 2023-10-04

## 2023-10-04 RX ORDER — ASPIRIN 325 MG
325 TABLET ORAL ONCE
Status: COMPLETED | OUTPATIENT
Start: 2023-10-04 | End: 2023-10-04

## 2023-10-04 RX ORDER — AMLODIPINE BESYLATE 5 MG/1
5 TABLET ORAL 2 TIMES DAILY
Qty: 180 TABLET | Refills: 3 | Status: SHIPPED | OUTPATIENT
Start: 2023-10-04 | End: 2023-10-14

## 2023-10-04 RX ORDER — FLUMAZENIL 0.1 MG/ML
0.2 INJECTION, SOLUTION INTRAVENOUS
Status: ACTIVE | OUTPATIENT
Start: 2023-10-04 | End: 2023-10-04

## 2023-10-04 RX ORDER — LIDOCAINE 40 MG/G
CREAM TOPICAL
Status: DISCONTINUED | OUTPATIENT
Start: 2023-10-04 | End: 2023-10-05 | Stop reason: HOSPADM

## 2023-10-04 RX ORDER — LIDOCAINE 40 MG/G
CREAM TOPICAL
Status: DISCONTINUED | OUTPATIENT
Start: 2023-10-04 | End: 2023-10-04 | Stop reason: HOSPADM

## 2023-10-04 RX ORDER — ATROPINE SULFATE 0.1 MG/ML
0.5 INJECTION INTRAVENOUS
Status: DISCONTINUED | OUTPATIENT
Start: 2023-10-04 | End: 2023-10-04

## 2023-10-04 RX ORDER — NALOXONE HYDROCHLORIDE 0.4 MG/ML
0.4 INJECTION, SOLUTION INTRAMUSCULAR; INTRAVENOUS; SUBCUTANEOUS
Status: ACTIVE | OUTPATIENT
Start: 2023-10-04 | End: 2023-10-04

## 2023-10-04 RX ORDER — ROSUVASTATIN CALCIUM 40 MG/1
40 TABLET, COATED ORAL AT BEDTIME
Qty: 90 TABLET | Refills: 3 | Status: ON HOLD | OUTPATIENT
Start: 2023-10-04 | End: 2024-05-15

## 2023-10-04 RX ORDER — NALOXONE HYDROCHLORIDE 0.4 MG/ML
0.2 INJECTION, SOLUTION INTRAMUSCULAR; INTRAVENOUS; SUBCUTANEOUS
Status: DISCONTINUED | OUTPATIENT
Start: 2023-10-04 | End: 2023-10-04

## 2023-10-04 RX ORDER — IOPAMIDOL 755 MG/ML
INJECTION, SOLUTION INTRAVASCULAR
Status: DISCONTINUED | OUTPATIENT
Start: 2023-10-04 | End: 2023-10-04 | Stop reason: HOSPADM

## 2023-10-04 RX ORDER — NITROGLYCERIN 5 MG/ML
VIAL (ML) INTRAVENOUS
Status: DISCONTINUED | OUTPATIENT
Start: 2023-10-04 | End: 2023-10-04 | Stop reason: HOSPADM

## 2023-10-04 RX ORDER — ACETAMINOPHEN 325 MG/1
650 TABLET ORAL EVERY 4 HOURS PRN
Status: DISCONTINUED | OUTPATIENT
Start: 2023-10-04 | End: 2023-10-04

## 2023-10-04 RX ORDER — ACETAMINOPHEN 325 MG/1
650 TABLET ORAL EVERY 4 HOURS PRN
Status: DISCONTINUED | OUTPATIENT
Start: 2023-10-04 | End: 2023-10-14 | Stop reason: HOSPADM

## 2023-10-04 RX ORDER — HEPARIN SODIUM 1000 [USP'U]/ML
INJECTION, SOLUTION INTRAVENOUS; SUBCUTANEOUS
Status: DISCONTINUED | OUTPATIENT
Start: 2023-10-04 | End: 2023-10-04 | Stop reason: HOSPADM

## 2023-10-04 RX ORDER — OXYCODONE HYDROCHLORIDE 5 MG/1
10 TABLET ORAL EVERY 4 HOURS PRN
Status: DISCONTINUED | OUTPATIENT
Start: 2023-10-04 | End: 2023-10-04

## 2023-10-04 RX ORDER — OXYCODONE HYDROCHLORIDE 5 MG/1
5 TABLET ORAL EVERY 4 HOURS PRN
Status: DISCONTINUED | OUTPATIENT
Start: 2023-10-04 | End: 2023-10-14 | Stop reason: HOSPADM

## 2023-10-04 RX ORDER — OXYCODONE HYDROCHLORIDE 5 MG/1
5 TABLET ORAL EVERY 4 HOURS PRN
Status: DISCONTINUED | OUTPATIENT
Start: 2023-10-04 | End: 2023-10-04

## 2023-10-04 RX ORDER — SODIUM CHLORIDE 9 MG/ML
75 INJECTION, SOLUTION INTRAVENOUS CONTINUOUS
Status: ACTIVE | OUTPATIENT
Start: 2023-10-04 | End: 2023-10-04

## 2023-10-04 RX ORDER — LORAZEPAM 0.5 MG/1
0.5 TABLET ORAL
Status: DISCONTINUED | OUTPATIENT
Start: 2023-10-04 | End: 2023-10-04 | Stop reason: HOSPADM

## 2023-10-04 RX ORDER — FLUMAZENIL 0.1 MG/ML
0.2 INJECTION, SOLUTION INTRAVENOUS
Status: DISCONTINUED | OUTPATIENT
Start: 2023-10-04 | End: 2023-10-04

## 2023-10-04 RX ORDER — METHYLPREDNISOLONE 16 MG/1
32 TABLET ORAL ONCE
Status: COMPLETED | OUTPATIENT
Start: 2023-10-04 | End: 2023-10-05

## 2023-10-04 RX ORDER — HEPARIN SODIUM 10000 [USP'U]/100ML
0-5000 INJECTION, SOLUTION INTRAVENOUS CONTINUOUS
Status: DISCONTINUED | OUTPATIENT
Start: 2023-10-04 | End: 2023-10-05

## 2023-10-04 RX ORDER — LORAZEPAM 2 MG/ML
0.5 INJECTION INTRAMUSCULAR
Status: DISCONTINUED | OUTPATIENT
Start: 2023-10-04 | End: 2023-10-04 | Stop reason: HOSPADM

## 2023-10-04 RX ORDER — NALOXONE HYDROCHLORIDE 0.4 MG/ML
0.4 INJECTION, SOLUTION INTRAMUSCULAR; INTRAVENOUS; SUBCUTANEOUS
Status: DISCONTINUED | OUTPATIENT
Start: 2023-10-04 | End: 2023-10-04

## 2023-10-04 RX ORDER — FENTANYL CITRATE 50 UG/ML
INJECTION, SOLUTION INTRAMUSCULAR; INTRAVENOUS
Status: DISCONTINUED | OUTPATIENT
Start: 2023-10-04 | End: 2023-10-04 | Stop reason: HOSPADM

## 2023-10-04 RX ORDER — DIPHENHYDRAMINE HCL 25 MG
50 CAPSULE ORAL ONCE
Status: COMPLETED | OUTPATIENT
Start: 2023-10-04 | End: 2023-10-05

## 2023-10-04 RX ORDER — METHYLPREDNISOLONE 16 MG/1
32 TABLET ORAL ONCE
Status: COMPLETED | OUTPATIENT
Start: 2023-10-04 | End: 2023-10-04

## 2023-10-04 RX ORDER — HEPARIN SODIUM 10000 [USP'U]/100ML
0-5000 INJECTION, SOLUTION INTRAVENOUS CONTINUOUS
Status: DISCONTINUED | OUTPATIENT
Start: 2023-10-04 | End: 2023-10-04

## 2023-10-04 RX ORDER — POTASSIUM CHLORIDE 1500 MG/1
20 TABLET, EXTENDED RELEASE ORAL
Status: DISCONTINUED | OUTPATIENT
Start: 2023-10-04 | End: 2023-10-04 | Stop reason: HOSPADM

## 2023-10-04 RX ADMIN — GABAPENTIN 100 MG: 100 CAPSULE ORAL at 22:03

## 2023-10-04 RX ADMIN — MELATONIN TAB 3 MG 3 MG: 3 TAB at 22:03

## 2023-10-04 RX ADMIN — CARVEDILOL 6.25 MG: 6.25 TABLET, FILM COATED ORAL at 09:00

## 2023-10-04 RX ADMIN — METHYLPREDNISOLONE 32 MG: 16 TABLET ORAL at 20:50

## 2023-10-04 RX ADMIN — ASPIRIN 81 MG CHEWABLE TABLET 243 MG: 81 TABLET CHEWABLE at 11:11

## 2023-10-04 RX ADMIN — LISINOPRIL 20 MG: 20 TABLET ORAL at 20:51

## 2023-10-04 RX ADMIN — PREDNISONE 50 MG: 50 TABLET ORAL at 06:10

## 2023-10-04 RX ADMIN — FLUTICASONE FUROATE AND VILANTEROL TRIFENATATE 1 PUFF: 200; 25 POWDER RESPIRATORY (INHALATION) at 09:01

## 2023-10-04 RX ADMIN — AMLODIPINE BESYLATE 5 MG: 5 TABLET ORAL at 09:01

## 2023-10-04 RX ADMIN — ROSUVASTATIN CALCIUM 40 MG: 20 TABLET, FILM COATED ORAL at 22:03

## 2023-10-04 RX ADMIN — OMEPRAZOLE 20 MG: 20 CAPSULE, DELAYED RELEASE ORAL at 09:01

## 2023-10-04 RX ADMIN — GABAPENTIN 100 MG: 100 CAPSULE ORAL at 09:01

## 2023-10-04 RX ADMIN — Medication 1 TABLET: at 09:01

## 2023-10-04 RX ADMIN — ASPIRIN 81 MG 81 MG: 81 TABLET ORAL at 09:00

## 2023-10-04 RX ADMIN — Medication 1 TABLET: at 18:56

## 2023-10-04 RX ADMIN — LISINOPRIL 20 MG: 20 TABLET ORAL at 09:00

## 2023-10-04 RX ADMIN — AMLODIPINE BESYLATE 5 MG: 5 TABLET ORAL at 20:51

## 2023-10-04 RX ADMIN — EMPAGLIFLOZIN 10 MG: 10 TABLET, FILM COATED ORAL at 11:58

## 2023-10-04 RX ADMIN — NICOTINE 1 PATCH: 14 PATCH, EXTENDED RELEASE TRANSDERMAL at 15:40

## 2023-10-04 RX ADMIN — ACETAMINOPHEN 650 MG: 325 TABLET, FILM COATED ORAL at 22:03

## 2023-10-04 RX ADMIN — CARVEDILOL 6.25 MG: 6.25 TABLET, FILM COATED ORAL at 18:56

## 2023-10-04 RX ADMIN — DIPHENHYDRAMINE HYDROCHLORIDE 50 MG: 25 CAPSULE ORAL at 11:58

## 2023-10-04 RX ADMIN — GABAPENTIN 100 MG: 100 CAPSULE ORAL at 15:42

## 2023-10-04 RX ADMIN — PREDNISONE 50 MG: 20 TABLET ORAL at 11:57

## 2023-10-04 RX ADMIN — PREDNISONE 50 MG: 50 TABLET ORAL at 00:49

## 2023-10-04 RX ADMIN — SODIUM CHLORIDE 75 ML/HR: 9 INJECTION, SOLUTION INTRAVENOUS at 14:30

## 2023-10-04 RX ADMIN — CLOPIDOGREL BISULFATE 75 MG: 75 TABLET ORAL at 09:01

## 2023-10-04 ASSESSMENT — ACTIVITIES OF DAILY LIVING (ADL)
ADLS_ACUITY_SCORE: 35
ADLS_ACUITY_SCORE: 20
DEPENDENT_IADLS:: INDEPENDENT
ADLS_ACUITY_SCORE: 35
ADLS_ACUITY_SCORE: 20
ADLS_ACUITY_SCORE: 35
ADLS_ACUITY_SCORE: 20
ADLS_ACUITY_SCORE: 35
ADLS_ACUITY_SCORE: 20
ADLS_ACUITY_SCORE: 35

## 2023-10-04 NOTE — PROVIDER NOTIFICATION
"MD Notification    Notified Person: MD    Notified Person Name:Dr. Paulino    Notification Date/Time:10/4/23 0693    Notification Interaction:text/page    Purpose of Notification:RLE cool, pinkish, unable to find RLE pulse with doppler. Pt states, \" It feels like it is dead.\" Numbness present and that is baseline. Please advise, thanks *[phone]    Orders Received:    Comments:   "
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
## 2023-10-04 NOTE — PROGRESS NOTES
[facility]  Cardiology Progress Note  Date of Service: 10/04/2023  Primary Cardiologist: Dr. Huang     Assessment & Plan    Shirley Hendricks is a 62 year old female admitted on 10/3/2023.     Assessment:  1.  NSTEMI, troponin peaked at 609, trending down.  Chest pain yesterday, now resolved. Coronary angiogram today did not reveal any obstructive CAD that would explain WMA or LV dysfunction. Presentation most consistent with stress CM.   2.  Stress cardiomyopathy, LVEF 30-35% on echo 10/3. Will optimize medications.   3.  Severe peripheral vascular disease status post aortobifemoral bypass and other interventions, follows with Dr. Lozano. Now with RLE heaviness and worsened numbness, lower extremity ultrasound revealed complete occlusion of the jump graft, ISIDRO, and DPA.  Vascular surgery following, planning for intervention.  4.  Hypertension  5.  Hyperlipidemia  6.  COPD  7.  Malignant B-cell lymphoma, patient was scheduled to have tissue biopsy of right neck mass 10/6/2023 (Plavix had been on hold for 6 days PTA).    Plan:   Coronary angiogram did not reveal any obstructive CAD that would explain WMA or LV dysfunction. Presentation most consistent with stress CM.  Continue medications as described below.  Plan for echocardiogram in 4 to 6 weeks.  Medications   -Continue rosuvastatin 40 mg once daily  -Continue lisinopril 20 mg twice daily; will consider transition to Entresto ($12/month)  -Continue carvedilol 6.25 mg twice daily  -Continue amlodipine 5 mg twice daily  -Continue ASA 81 mg once daily  -Continue Plavix 75 mg once daily  -Start Jardiance 10 mg once daily; price will be $12/month  Echocardiogram in 4 to 6 weeks  with subsequent cardiology BRANDON follow-up  Cardiology will sign off.    Rosa Elena Emanuel PA-C  [facility] - [facility]  Pager: [phone]    Interval History   Overnight, patient developed right lower extremity heaviness, worsening numbness implore.  Lower  extremity US revealed complete occlusion of the jump graft, ISIDRO, and DPA.  Vascular surgery consulted-planning for procedure this admission.    Patient awaiting coronary angiogram this afternoon.  Denies chest pain, shortness of breath.  Emotional.  Family at bedside.    Physical Exam   Temp: 97.8  F (36.6  C) Temp src: Oral BP: 132/72 Pulse: 70   Resp: 16 SpO2: 98 % O2 Device: None (Room air)    Vitals:    10/03/23 0741 10/04/23 0612   Weight: 52.2 kg (115 lb) 51.5 kg (113 lb 8 oz)       GEN: well nourished, emotional  HEENT:  Pupils equal, round. Sclerae nonicteric.   NECK: Supple, no masses appreciated.  No JVD  C/V:  Regular rate and rhythm, no murmur  RESP: Respirations are unlabored. Clear to auscultation bilaterally without wheezing, rales, or rhonchi.  GI: Abdomen soft, nontender.  EXTREM: No LE edema.  NEURO: Alert and oriented, cooperative.  SKIN: Warm and dry.     Medications    Continuing ACE inhibitor/ARB/ARNI from home medication list OR ACE inhibitor/ARB order already placed during this visit      - MEDICATION INSTRUCTIONS -      - MEDICATION INSTRUCTIONS -      heparin 600 Units/hr (10/04/23 0051)    - MEDICATION INSTRUCTIONS -        amLODIPine  5 mg Oral BID    aspirin  81 mg Oral Daily    calcium carbonate-vitamin D  1 tablet Oral BID w/meals    carvedilol  6.25 mg Oral BID w/meals    clopidogrel  75 mg Oral Daily    diphenhydrAMINE  50 mg Oral Once    fluticasone-vilanterol  1 puff Inhalation Daily    gabapentin  100 mg Oral TID    lisinopril  20 mg Oral BID    nicotine  1 patch Transdermal Daily    nicotine   Transdermal Q8H    nicotine   Transdermal Once    omeprazole  20 mg Oral Daily    predniSONE  50 mg Oral Once    rosuvastatin  40 mg Oral At Bedtime    triamcinolone   Topical BID       Data   Last 24 hours labs reviewed     Tele:     Echo 10/3/2023  Normal LV size and wall thickness with severely reduced LV systolic function.  Visually estimated LVEF is around 30 to 35%.  Mid to distal  portions of the left ventricle are completely akinetic to  severely hypokinetic. Basal segment contractility seems preserved. Findings  likely suggest stress-induced cardiomyopathy.  Normal RV size and systolic function.  No hemodynamically significant valve disease.  IVC is normal in size and collapsible.    US lower extremity arterial duplex, right 10/4/2023  IMPRESSION:  1.  Complete occlusion of the jump graft, ISIDRO, and DPA. The inflow  femoropopliteal bypass graft is patent.  2.  Focal velocity elevation of the distal femoropopliteal graft  anastomosis suggesting luminal stenosis, however this is distal to the  origin of the jump graft.  3.  Redemonstration of chronic occlusion of the outflow popliteal  artery and TP trunk distal to the femoropopliteal bypass graft.
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
## 2023-10-04 NOTE — PROGRESS NOTES
Brief vascular surgery note      Shirley underwent coronary angiography today via left radial access which identified chronic multivessel coronary disease with moderate stenosis and D1 occlusion.  She denies any new changes to the right leg or foot since last evaluated this morning.  Right foot remains somewhat more cool from the left, and greater diminished sensation to the level of the midfoot compared to the left.  Normal and symmetric strength with plantar and dorsiflexion as well as toe extension and flexion.    She will undergo right leg angiography tomorrow with plan to initiate catheter directed lytic therapy at that time.  She will again be premedicated for her contrast allergy and will be n.p.o. at midnight.  Continue to heparin drip.  Please call vascular surgery with any change in exam.    Yamil Villagomez MD

## 2023-10-04 NOTE — CONSULTS
Patient has Medicare Advantage through [employer] sponsored by an employer.    Farxiga: $12/mo.   Jardiance: $12/mo.       Entresto: $12/mo.     Jennifer Lorenzana  Pharmacy Technician/Liaison, Discharge Pharmacy   [phone] (voice or text)  [email or web address]

## 2023-10-04 NOTE — PROGRESS NOTES
Patient is A/O x 4, vss, a-febrile, c/o right foot pain from occlusion, right foot is cold touch, no pulses per Doppler, US duplex to the affected foot showed complete occlusion of jump graft  anterior tibial Artery and distal popliteal Artery, plan NPO after midnight  for right leg angiography and catheter directed lytic therapy at the time. Patient had  coronary angiogram  without intervention today, left radial approach, site is CDI, no hematoma, up to the bathroom with SBA, tele SR/SB, resume Heparin at 2130 @ 600 units/hr.

## 2023-10-04 NOTE — SIGNIFICANT EVENT
Significant Event Note    Time of event: 6:40 AM October 4, 2023    Description of event:  Paged for RLE feeling newly heavy and ongoing numbness and pallor. Nurses at present are not able to obtain pulses by Doppler.    Plan:  Heparin infusion continued. Vascular Surgery consulted for further evaluation    Discussed with: bedside nurse    Randal Paulino MD

## 2023-10-04 NOTE — PROVIDER NOTIFICATION
MD Notification    Notified Person: MD    Notified Person Name:Dr. Fletcher    Notification Date/Time:101/3/23 2214    Notification Interaction:text/page    Purpose of Notification:Pt is requesting triamcinolone(Kenalog) cream to be ordered,  thanks [phone]    Orders Received:    Comments:
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
## 2023-10-04 NOTE — PLAN OF CARE
A&OX4, RA, BP soft, other VSS. Tele-SR. Up with SBA, heparin gtt infusing at 600 units/hr. Rash in her back, triamcinolone cream applied. Tylenol given for headache, RLE cool, pulses present with doppler.   Plan: NPO for angiogram    0612: Unable to find RLE pulse with doppler

## 2023-10-04 NOTE — PROGRESS NOTES
"SPIRITUAL HEALTH SERVICES  SPIRITUAL ASSESSMENT Progress Note  [facility]     REFERRAL SOURCE: [facility]    Brief visit with Shirley and her sister, per patient's request to follow up after her angio earlier this afternoon. Shirley declined a visit at this time because she is \"feeling groggy,\" but requested a visit tomorrow.    PLAN: Triaged for follow up visit tomorrow.    Rima Hall  Associate     [facility] routine referrals *[phone]     [facility] available 24/7 for emergent requests/referrals, either by paging the on-call  or by entering an ASAP/STAT consult in Epic (this will also page the on-call ).     "

## 2023-10-05 ENCOUNTER — APPOINTMENT (OUTPATIENT)
Dept: INTERVENTIONAL RADIOLOGY/VASCULAR | Facility: CLINIC | Age: 63
DRG: 270 | End: 2023-10-05
Attending: STUDENT IN AN ORGANIZED HEALTH CARE EDUCATION/TRAINING PROGRAM
Payer: COMMERCIAL

## 2023-10-05 LAB
ANION GAP SERPL CALCULATED.3IONS-SCNC: 13 MMOL/L (ref 7–15)
BUN SERPL-MCNC: 21.9 MG/DL (ref 8–23)
CALCIUM SERPL-MCNC: 8.7 MG/DL (ref 8.8–10.2)
CHLORIDE SERPL-SCNC: 101 MMOL/L (ref 98–107)
COHGB MFR BLD: 98 % (ref 92–100)
CREAT SERPL-MCNC: 0.76 MG/DL (ref 0.51–0.95)
DEPRECATED HCO3 PLAS-SCNC: 18 MMOL/L (ref 22–29)
EGFRCR SERPLBLD CKD-EPI 2021: 87 ML/MIN/1.73M2
GLUCOSE SERPL-MCNC: 120 MG/DL (ref 70–99)
HCO3 BLDA-SCNC: 20 MMOL/L (ref 21–28)
HGB BLD-MCNC: 11.5 G/DL (ref 11.7–15.7)
HOLD SPECIMEN: NORMAL
INR PPP: 0.95 (ref 0.85–1.15)
LACTATE BLD-SCNC: <0.3 MMOL/L
MAGNESIUM SERPL-MCNC: 1.9 MG/DL (ref 1.7–2.3)
PCO2 BLDA: 45 MM HG (ref 35–45)
PH BLDA: 7.27 [PH] (ref 7.35–7.45)
PO2 BLDA: 115 MM HG (ref 80–105)
POTASSIUM SERPL-SCNC: 4.3 MMOL/L (ref 3.4–5.3)
SODIUM SERPL-SCNC: 132 MMOL/L (ref 135–145)
UFH PPP CHRO-ACNC: 0.16 IU/ML
UFH PPP CHRO-ACNC: 0.68 IU/ML
UFH PPP CHRO-ACNC: >1.1 IU/ML

## 2023-10-05 PROCEDURE — 99152 MOD SED SAME PHYS/QHP 5/>YRS: CPT

## 2023-10-05 PROCEDURE — 272N000124 HC CATH CR11

## 2023-10-05 PROCEDURE — C1769 GUIDE WIRE: HCPCS

## 2023-10-05 PROCEDURE — 83735 ASSAY OF MAGNESIUM: CPT | Performed by: INTERNAL MEDICINE

## 2023-10-05 PROCEDURE — 250N000013 HC RX MED GY IP 250 OP 250 PS 637: Performed by: HOSPITALIST

## 2023-10-05 PROCEDURE — 85018 HEMOGLOBIN: CPT | Performed by: INTERNAL MEDICINE

## 2023-10-05 PROCEDURE — 258N000003 HC RX IP 258 OP 636: Performed by: RADIOLOGY

## 2023-10-05 PROCEDURE — 36415 COLL VENOUS BLD VENIPUNCTURE: CPT | Performed by: HOSPITALIST

## 2023-10-05 PROCEDURE — 272N000196 HC ACCESSORY CR5

## 2023-10-05 PROCEDURE — 250N000011 HC RX IP 250 OP 636: Mod: JZ | Performed by: STUDENT IN AN ORGANIZED HEALTH CARE EDUCATION/TRAINING PROGRAM

## 2023-10-05 PROCEDURE — 255N000002 HC RX 255 OP 636: Performed by: RADIOLOGY

## 2023-10-05 PROCEDURE — 3E03317 INTRODUCTION OF OTHER THROMBOLYTIC INTO PERIPHERAL VEIN, PERCUTANEOUS APPROACH: ICD-10-PCS | Performed by: RADIOLOGY

## 2023-10-05 PROCEDURE — 200N000001 HC R&B ICU

## 2023-10-05 PROCEDURE — 250N000013 HC RX MED GY IP 250 OP 250 PS 637

## 2023-10-05 PROCEDURE — 272N000566 HC SHEATH CR3

## 2023-10-05 PROCEDURE — 36415 COLL VENOUS BLD VENIPUNCTURE: CPT | Performed by: INTERNAL MEDICINE

## 2023-10-05 PROCEDURE — 36247 INS CATH ABD/L-EXT ART 3RD: CPT

## 2023-10-05 PROCEDURE — 272N000194 HC ACCESSORY CR3

## 2023-10-05 PROCEDURE — 250N000013 HC RX MED GY IP 250 OP 250 PS 637: Performed by: RADIOLOGY

## 2023-10-05 PROCEDURE — 85520 HEPARIN ASSAY: CPT | Performed by: HOSPITALIST

## 2023-10-05 PROCEDURE — 37211 THROMBOLYTIC ART THERAPY: CPT

## 2023-10-05 PROCEDURE — 250N000012 HC RX MED GY IP 250 OP 636 PS 637: Performed by: STUDENT IN AN ORGANIZED HEALTH CARE EDUCATION/TRAINING PROGRAM

## 2023-10-05 PROCEDURE — 99232 SBSQ HOSP IP/OBS MODERATE 35: CPT | Performed by: SURGERY

## 2023-10-05 PROCEDURE — 250N000013 HC RX MED GY IP 250 OP 250 PS 637: Performed by: INTERNAL MEDICINE

## 2023-10-05 PROCEDURE — 250N000013 HC RX MED GY IP 250 OP 250 PS 637: Performed by: STUDENT IN AN ORGANIZED HEALTH CARE EDUCATION/TRAINING PROGRAM

## 2023-10-05 PROCEDURE — 272N000116 HC CATH CR1

## 2023-10-05 PROCEDURE — 82310 ASSAY OF CALCIUM: CPT | Performed by: INTERNAL MEDICINE

## 2023-10-05 PROCEDURE — B41F1ZZ FLUOROSCOPY OF RIGHT LOWER EXTREMITY ARTERIES USING LOW OSMOLAR CONTRAST: ICD-10-PCS | Performed by: RADIOLOGY

## 2023-10-05 PROCEDURE — 99232 SBSQ HOSP IP/OBS MODERATE 35: CPT | Performed by: HOSPITALIST

## 2023-10-05 PROCEDURE — 250N000011 HC RX IP 250 OP 636: Performed by: PHYSICIAN ASSISTANT

## 2023-10-05 PROCEDURE — 85610 PROTHROMBIN TIME: CPT | Performed by: PHYSICIAN ASSISTANT

## 2023-10-05 PROCEDURE — 250N000011 HC RX IP 250 OP 636: Mod: JZ | Performed by: RADIOLOGY

## 2023-10-05 PROCEDURE — 85520 HEPARIN ASSAY: CPT | Performed by: INTERNAL MEDICINE

## 2023-10-05 RX ORDER — ONDANSETRON 4 MG/1
4 TABLET, ORALLY DISINTEGRATING ORAL EVERY 6 HOURS PRN
Status: DISCONTINUED | OUTPATIENT
Start: 2023-10-05 | End: 2023-10-14 | Stop reason: HOSPADM

## 2023-10-05 RX ORDER — GABAPENTIN 100 MG/1
100 CAPSULE ORAL 3 TIMES DAILY
Qty: 90 CAPSULE | Refills: 0 | Status: SHIPPED | OUTPATIENT
Start: 2023-10-05 | End: 2023-11-13

## 2023-10-05 RX ORDER — SODIUM CHLORIDE 9 MG/ML
INJECTION, SOLUTION INTRAVENOUS CONTINUOUS
Status: DISCONTINUED | OUTPATIENT
Start: 2023-10-05 | End: 2023-10-06

## 2023-10-05 RX ORDER — NALOXONE HYDROCHLORIDE 0.4 MG/ML
0.4 INJECTION, SOLUTION INTRAMUSCULAR; INTRAVENOUS; SUBCUTANEOUS
Status: DISCONTINUED | OUTPATIENT
Start: 2023-10-05 | End: 2023-10-05 | Stop reason: HOSPADM

## 2023-10-05 RX ORDER — DOCUSATE SODIUM 100 MG/1
100 CAPSULE, LIQUID FILLED ORAL 2 TIMES DAILY
Status: DISCONTINUED | OUTPATIENT
Start: 2023-10-05 | End: 2023-10-14 | Stop reason: HOSPADM

## 2023-10-05 RX ORDER — HEPARIN SODIUM 200 [USP'U]/100ML
1 INJECTION, SOLUTION INTRAVENOUS CONTINUOUS PRN
Status: DISCONTINUED | OUTPATIENT
Start: 2023-10-05 | End: 2023-10-05 | Stop reason: HOSPADM

## 2023-10-05 RX ORDER — NALOXONE HYDROCHLORIDE 0.4 MG/ML
0.2 INJECTION, SOLUTION INTRAMUSCULAR; INTRAVENOUS; SUBCUTANEOUS
Status: DISCONTINUED | OUTPATIENT
Start: 2023-10-05 | End: 2023-10-05 | Stop reason: HOSPADM

## 2023-10-05 RX ORDER — SODIUM CHLORIDE 9 MG/ML
INJECTION, SOLUTION INTRAVENOUS CONTINUOUS
Status: DISCONTINUED | OUTPATIENT
Start: 2023-10-05 | End: 2023-10-05 | Stop reason: HOSPADM

## 2023-10-05 RX ORDER — METOCLOPRAMIDE 5 MG/1
10 TABLET ORAL EVERY 6 HOURS PRN
Status: DISCONTINUED | OUTPATIENT
Start: 2023-10-05 | End: 2023-10-14 | Stop reason: HOSPADM

## 2023-10-05 RX ORDER — ONDANSETRON 2 MG/ML
4 INJECTION INTRAMUSCULAR; INTRAVENOUS EVERY 6 HOURS PRN
Status: DISCONTINUED | OUTPATIENT
Start: 2023-10-05 | End: 2023-10-14 | Stop reason: HOSPADM

## 2023-10-05 RX ORDER — METOCLOPRAMIDE HYDROCHLORIDE 5 MG/ML
10 INJECTION INTRAMUSCULAR; INTRAVENOUS EVERY 6 HOURS PRN
Status: DISCONTINUED | OUTPATIENT
Start: 2023-10-05 | End: 2023-10-14 | Stop reason: HOSPADM

## 2023-10-05 RX ORDER — PROCHLORPERAZINE 25 MG
25 SUPPOSITORY, RECTAL RECTAL EVERY 12 HOURS PRN
Status: DISCONTINUED | OUTPATIENT
Start: 2023-10-05 | End: 2023-10-14 | Stop reason: HOSPADM

## 2023-10-05 RX ORDER — FLUMAZENIL 0.1 MG/ML
0.2 INJECTION, SOLUTION INTRAVENOUS
Status: DISCONTINUED | OUTPATIENT
Start: 2023-10-05 | End: 2023-10-05 | Stop reason: HOSPADM

## 2023-10-05 RX ORDER — IODIXANOL 320 MG/ML
100 INJECTION, SOLUTION INTRAVASCULAR ONCE
Status: COMPLETED | OUTPATIENT
Start: 2023-10-05 | End: 2023-10-05

## 2023-10-05 RX ORDER — PROCHLORPERAZINE MALEATE 10 MG
10 TABLET ORAL EVERY 6 HOURS PRN
Status: DISCONTINUED | OUTPATIENT
Start: 2023-10-05 | End: 2023-10-14 | Stop reason: HOSPADM

## 2023-10-05 RX ORDER — HEPARIN SODIUM 10000 [USP'U]/100ML
500 INJECTION, SOLUTION INTRAVENOUS CONTINUOUS
Status: DISCONTINUED | OUTPATIENT
Start: 2023-10-05 | End: 2023-10-06

## 2023-10-05 RX ORDER — FENTANYL CITRATE 50 UG/ML
25-50 INJECTION, SOLUTION INTRAMUSCULAR; INTRAVENOUS EVERY 5 MIN PRN
Status: DISCONTINUED | OUTPATIENT
Start: 2023-10-05 | End: 2023-10-05 | Stop reason: HOSPADM

## 2023-10-05 RX ADMIN — DIPHENHYDRAMINE HYDROCHLORIDE 50 MG: 25 CAPSULE ORAL at 12:33

## 2023-10-05 RX ADMIN — CARVEDILOL 6.25 MG: 6.25 TABLET, FILM COATED ORAL at 09:44

## 2023-10-05 RX ADMIN — MIDAZOLAM 1 MG: 1 INJECTION INTRAMUSCULAR; INTRAVENOUS at 16:09

## 2023-10-05 RX ADMIN — FENTANYL CITRATE 25 MCG: 50 INJECTION, SOLUTION INTRAMUSCULAR; INTRAVENOUS at 16:28

## 2023-10-05 RX ADMIN — ROSUVASTATIN CALCIUM 40 MG: 20 TABLET, FILM COATED ORAL at 22:01

## 2023-10-05 RX ADMIN — HEPARIN SODIUM 500 UNITS/HR: 10000 INJECTION, SOLUTION INTRAVENOUS at 17:27

## 2023-10-05 RX ADMIN — LISINOPRIL 20 MG: 20 TABLET ORAL at 19:51

## 2023-10-05 RX ADMIN — MIDAZOLAM 0.5 MG: 1 INJECTION INTRAMUSCULAR; INTRAVENOUS at 16:26

## 2023-10-05 RX ADMIN — FENTANYL CITRATE 50 MCG: 50 INJECTION, SOLUTION INTRAMUSCULAR; INTRAVENOUS at 16:20

## 2023-10-05 RX ADMIN — HEPARIN SODIUM 600 UNITS/HR: 10000 INJECTION, SOLUTION INTRAVENOUS at 03:31

## 2023-10-05 RX ADMIN — ALTEPLASE 0.5 MG/HR: 2.2 INJECTION, POWDER, LYOPHILIZED, FOR SOLUTION INTRAVENOUS at 17:25

## 2023-10-05 RX ADMIN — OXYCODONE HYDROCHLORIDE 10 MG: 5 TABLET ORAL at 19:51

## 2023-10-05 RX ADMIN — SODIUM CHLORIDE: 9 INJECTION, SOLUTION INTRAVENOUS at 20:28

## 2023-10-05 RX ADMIN — GABAPENTIN 100 MG: 100 CAPSULE ORAL at 14:23

## 2023-10-05 RX ADMIN — ASPIRIN 81 MG 81 MG: 81 TABLET ORAL at 09:43

## 2023-10-05 RX ADMIN — METHYLPREDNISOLONE 32 MG: 16 TABLET ORAL at 13:02

## 2023-10-05 RX ADMIN — Medication 1 TABLET: at 09:43

## 2023-10-05 RX ADMIN — SODIUM CHLORIDE: 9 INJECTION, SOLUTION INTRAVENOUS at 18:32

## 2023-10-05 RX ADMIN — MIDAZOLAM 0.5 MG: 1 INJECTION INTRAMUSCULAR; INTRAVENOUS at 16:41

## 2023-10-05 RX ADMIN — MIDAZOLAM 0.5 MG: 1 INJECTION INTRAMUSCULAR; INTRAVENOUS at 16:33

## 2023-10-05 RX ADMIN — GABAPENTIN 100 MG: 100 CAPSULE ORAL at 09:43

## 2023-10-05 RX ADMIN — CLOPIDOGREL BISULFATE 75 MG: 75 TABLET ORAL at 09:43

## 2023-10-05 RX ADMIN — AMLODIPINE BESYLATE 5 MG: 5 TABLET ORAL at 19:51

## 2023-10-05 RX ADMIN — TRIAMCINOLONE ACETONIDE: 0.25 CREAM TOPICAL at 20:27

## 2023-10-05 RX ADMIN — FLUTICASONE FUROATE AND VILANTEROL TRIFENATATE 1 PUFF: 200; 25 POWDER RESPIRATORY (INHALATION) at 09:52

## 2023-10-05 RX ADMIN — TRIAMCINOLONE ACETONIDE: 0.25 CREAM TOPICAL at 09:45

## 2023-10-05 RX ADMIN — IODIXANOL 45 ML: 320 INJECTION, SOLUTION INTRAVASCULAR at 17:19

## 2023-10-05 RX ADMIN — AMLODIPINE BESYLATE 5 MG: 5 TABLET ORAL at 09:44

## 2023-10-05 RX ADMIN — OMEPRAZOLE 20 MG: 20 CAPSULE, DELAYED RELEASE ORAL at 09:45

## 2023-10-05 RX ADMIN — DOCUSATE SODIUM 100 MG: 100 CAPSULE, LIQUID FILLED ORAL at 20:28

## 2023-10-05 RX ADMIN — GABAPENTIN 100 MG: 100 CAPSULE ORAL at 22:01

## 2023-10-05 RX ADMIN — LISINOPRIL 20 MG: 20 TABLET ORAL at 09:44

## 2023-10-05 RX ADMIN — EMPAGLIFLOZIN 10 MG: 10 TABLET, FILM COATED ORAL at 09:45

## 2023-10-05 RX ADMIN — FENTANYL CITRATE 25 MCG: 50 INJECTION, SOLUTION INTRAMUSCULAR; INTRAVENOUS at 16:49

## 2023-10-05 RX ADMIN — Medication 1 TABLET: at 20:28

## 2023-10-05 RX ADMIN — FENTANYL CITRATE 25 MCG: 50 INJECTION, SOLUTION INTRAMUSCULAR; INTRAVENOUS at 16:37

## 2023-10-05 ASSESSMENT — ACTIVITIES OF DAILY LIVING (ADL)
ADLS_ACUITY_SCORE: 20
ADLS_ACUITY_SCORE: 24
ADLS_ACUITY_SCORE: 20
ADLS_ACUITY_SCORE: 24
ADLS_ACUITY_SCORE: 20
ADLS_ACUITY_SCORE: 20
ADLS_ACUITY_SCORE: 24
ADLS_ACUITY_SCORE: 26
ADLS_ACUITY_SCORE: 26
ADLS_ACUITY_SCORE: 24
ADLS_ACUITY_SCORE: 28
ADLS_ACUITY_SCORE: 20

## 2023-10-05 NOTE — IR NOTE
Interventional Radiology Intra-procedural Nursing Note    Patient Name: Shirley Hendricks  Medical Record Number: [id number]  Today's Date: October 5, 2023    Procedure: Right Lower Extremity Angiogram with Fibronolytic Therapy, with Moderate Sedation  Start time: 1619  End time: 1717  Report provided to: Elise ANN  Patient depart time and location: 1743 to ICU    Note: Patient entered Interventional Radiology Suite number 1 via cart. Patient awake, alert and orientated. Assisted onto procedural table in supine position. Prepped and draped.  Dr. Carlson in room. Time out and procedure started. Vital signs stable. Telemetry reading sinus arrhythmia.    Procedure well tolerated by patient without complications. Procedure end with debrief by Dr. Carlson.    Lytics started, TPA 0.5 mg/hr, Hep 500u/hr will follow-up tomorrow.      Administered medication totals:  Lidocaine 1% 18 mL Intradermal  Versed 2.5 mg IVP  Fentanyl 125 mcg IVP    Last dose of sedation administered at 1649.
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
## 2023-10-05 NOTE — PROGRESS NOTES
VASCULAR SURGERY    Just back from interventional radiology.  Transferred to ICU.  Reviewed with Dr. Carlson of IR.  Able to get into jump graft anterior tibial artery and infusing with lytic therapy.    Very comfortable in ICU.  Already has a palpable pulse in jump graft and Doppler flow in DP with no palpable pulse yet      Reviewed angiograms and discussed with patient.    PLAN: Lytic therapy overnight with repeat angiogram in the morning.  Discussed with patient.    Chadwick Lozano MD

## 2023-10-05 NOTE — PRE-PROCEDURE
GENERAL PRE-PROCEDURE:   Procedure:  Right lower extremity angiogram with possible intervention and thrombectomy and/or thrombolysis    Written consent obtained?: Yes    Risks and benefits: Risks, benefits and alternatives were discussed    Consent given by:  Patient  Patient states understanding of procedure being performed: Yes    Patient's understanding of procedure matches consent: Yes    Procedure consent matches procedure scheduled: Yes    Expected level of sedation:  Moderate  Appropriately NPO:  Yes  ASA Class:  3  Mallampati  :  Grade 2- soft palate, base of uvula, tonsillar pillars, and portion of posterior pharyngeal wall visible  Lungs:  Lungs clear with good breath sounds bilaterally  Heart:  Normal heart sounds and rate  History & Physical reviewed:  History and physical reviewed and no updates needed  Statement of review:  I have reviewed the lab findings, diagnostic data, medications, and the plan for sedation    Vineet Haro PA-C  Interventional Radiology  [phone] (IR)  *[phone] ([facility] Office)

## 2023-10-05 NOTE — PROGRESS NOTES
[id number]  Alert and oriented times four  Right radial wdl   Heparin gtt at 600 units/hr  NPO  Room air  Right foot unchanged   Tele: SR/SRB  plan: continue to monitor

## 2023-10-05 NOTE — PLAN OF CARE
Arrived to unit at 1800. A/O x4. AVSS on RA. Neuros intact. Alteplase to infusion cath at 0.5 mg/hr, heparin infusing into sheath at 500 units/hr. RLE calf, 31.5cm. Baseline numbness in all extremities. RLE cool, no discoloration. Pulses, palpable, +2. Puncture site soft, moderate bloody drainage started at 1915, dressing reinforced, MD notified- awaiting response.  Plan for IR tomorrow early AM. NPO at midnight.

## 2023-10-05 NOTE — PROGRESS NOTES
Vascular Surgery Progress Note    S: Tolerated coronary angiogram via radial artery well yesterday.  Plan conservative treatment.  Denies chest pain.  Stable tingling to right foot    O:   Vitals:  BP  Min: 102/60  Max: 151/78  Temp  Av.1  F (36.7  C)  Min: 97.8  F (36.6  C)  Max: 98.2  F (36.8  C)  Pulse  Av.1  Min: 53  Max: 72  No intake/output data recorded.    Physical Exam: Alert and appropriate.  Comfortable.    Intact motor and sensory right foot still cool.  +3 palpable pulse in right femoral-popliteal graft but not angioplasty anterior tibial          Assessment/Plan: Plan interventional radiology right leg angiogram today specifically can open up the graft to anterior tibial artery.  Continue IV heparin       Wm. Blake MD

## 2023-10-05 NOTE — TELEPHONE ENCOUNTER
Requested Prescriptions   Pending Prescriptions Disp Refills    gabapentin (NEURONTIN) 100 MG capsule [Pharmacy Med Name: Gabapentin Oral Capsule 100 MG] 90 capsule 0     Sig: TAKE ONE CAPSULE BY MOUTH THREE TIMES DAILY       There is no refill protocol information for this order        Routing refill request to provider for review/approval because:  Drug not on the G refill protocol
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
## 2023-10-05 NOTE — PROCEDURES
Interventional Radiology Post-Procedure Note     Patient name:  Shirley Hendricks  MRN:  [id number]   Date:  10/5/2023     Procedure(s): RLE angiogram and initiation of thrombolysis    Attending:  Robyn    Sedation:  Moderate sedation    Pre/Post Procedure Diagnosis: CLTI    Drains/Lines:  Right CFA sheath and infusion catheter    Specimen(s):  None    Estimated Blood Loss:  Minimal    Complications:  None     Findings:    RLE angiogram via antegrade CFA access just proximal to fem-pop bypass.  Patent CFA to above-knee pop bypass.  Complete occlusion of fem-pop bypass to AT jump bypass.  Minimal perfusion to the lower leg via tiny collaterals without reconstitution of any primary tibial vessels.  The occluded jump bypass origin was cannulated and thrombolysis infusion catheter was placed across the graft.    Please see dictation in PACS or under the Imaging tab in EPIC for detailed procedure note.    Plan:    -Initiation of catheter directed thrombolysis with tPA 0.5mg/hr through the infusion catheter and 500U/hr heparin through the femoral sheath. Hold systemic therapeutic heparin gtt  -Follow-up RLE angiogram and possible intervention tomorrow with IR      Héctor Carlson MD  Vascular & Interventional Radiology  10/5/2023  5:34 PM

## 2023-10-05 NOTE — PROGRESS NOTES
Care Management Follow Up    Length of Stay (days): 2    Expected Discharge Date: 10/07/2023     Concerns to be Addressed:       Patient plan of care discussed at interdisciplinary rounds: Yes    Anticipated Discharge Disposition: Other (Comments) (TBD)     Anticipated Discharge Services: None  Anticipated Discharge DME: None    Patient/family educated on Medicare website which has current facility and service quality ratings:    Education Provided on the Discharge Plan:    Patient/Family in Agreement with the Plan: yes    Referrals Placed by CM/SW:    Private pay costs discussed: Not applicable    Additional Information:  JUANITA received call from Debbie with the [employer] ([phone]). She infomred JUANITA that she is working with daughter Malu to and providing resources they are able to assist with. However wanted to make sure JUANITA knows that [employer] assist with some money to help within the first 48-72 hours but are not able to provide any long term assist and housing arrangement will need to be made by patient/family. JUANITA asked to give patient Debbie's number. SW provided number to patient.    Addendum: JUANITA spoke with Malu and informed she just got approval for patient and whole family to stay in an extended stay hotel covered through homeowners insurance. JUANITA informed patient will discharge to this extended stay hotel with family once medically ready    DAWN Ricketts    [facility]

## 2023-10-06 ENCOUNTER — APPOINTMENT (OUTPATIENT)
Dept: INTERVENTIONAL RADIOLOGY/VASCULAR | Facility: CLINIC | Age: 63
DRG: 270 | End: 2023-10-06
Attending: SURGERY
Payer: COMMERCIAL

## 2023-10-06 ENCOUNTER — APPOINTMENT (OUTPATIENT)
Dept: INTERVENTIONAL RADIOLOGY/VASCULAR | Facility: CLINIC | Age: 63
DRG: 270 | End: 2023-10-06
Attending: PHYSICIAN ASSISTANT
Payer: COMMERCIAL

## 2023-10-06 ENCOUNTER — ANESTHESIA EVENT (OUTPATIENT)
Dept: SURGERY | Facility: CLINIC | Age: 63
DRG: 270 | End: 2023-10-06
Payer: COMMERCIAL

## 2023-10-06 ENCOUNTER — ANESTHESIA (OUTPATIENT)
Dept: SURGERY | Facility: CLINIC | Age: 63
DRG: 270 | End: 2023-10-06
Payer: COMMERCIAL

## 2023-10-06 LAB
ABO/RH(D): NORMAL
ANION GAP SERPL CALCULATED.3IONS-SCNC: 11 MMOL/L (ref 7–15)
ANTIBODY SCREEN: NEGATIVE
APTT PPP: 41 SECONDS (ref 22–38)
BASO+EOS+MONOS # BLD AUTO: ABNORMAL 10*3/UL
BASO+EOS+MONOS # BLD AUTO: ABNORMAL 10*3/UL
BASO+EOS+MONOS NFR BLD AUTO: ABNORMAL %
BASO+EOS+MONOS NFR BLD AUTO: ABNORMAL %
BASOPHILS # BLD AUTO: 0 10E3/UL (ref 0–0.2)
BASOPHILS # BLD AUTO: 0 10E3/UL (ref 0–0.2)
BASOPHILS NFR BLD AUTO: 0 %
BASOPHILS NFR BLD AUTO: 0 %
BUN SERPL-MCNC: 18.9 MG/DL (ref 8–23)
CALCIUM SERPL-MCNC: 8.4 MG/DL (ref 8.8–10.2)
CHLORIDE SERPL-SCNC: 107 MMOL/L (ref 98–107)
CREAT SERPL-MCNC: 0.72 MG/DL (ref 0.51–0.95)
DAT POLY: NEGATIVE
DEPRECATED HCO3 PLAS-SCNC: 18 MMOL/L (ref 22–29)
EGFRCR SERPLBLD CKD-EPI 2021: >90 ML/MIN/1.73M2
EOSINOPHIL # BLD AUTO: 0 10E3/UL (ref 0–0.7)
EOSINOPHIL # BLD AUTO: 0 10E3/UL (ref 0–0.7)
EOSINOPHIL NFR BLD AUTO: 0 %
EOSINOPHIL NFR BLD AUTO: 0 %
ERYTHROCYTE [DISTWIDTH] IN BLOOD BY AUTOMATED COUNT: 14.9 % (ref 10–15)
ERYTHROCYTE [DISTWIDTH] IN BLOOD BY AUTOMATED COUNT: 15 % (ref 10–15)
FERRITIN SERPL-MCNC: 37 NG/ML (ref 11–328)
FIBRINOGEN PPP-MCNC: 236 MG/DL (ref 170–490)
FOLATE SERPL-MCNC: 16.3 NG/ML (ref 4.6–34.8)
GLUCOSE BLDC GLUCOMTR-MCNC: 101 MG/DL (ref 70–99)
GLUCOSE BLDC GLUCOMTR-MCNC: 98 MG/DL (ref 70–99)
GLUCOSE SERPL-MCNC: 78 MG/DL (ref 70–99)
HCT VFR BLD AUTO: 32.9 % (ref 35–47)
HCT VFR BLD AUTO: 33.3 % (ref 35–47)
HGB BLD-MCNC: 10 G/DL (ref 11.7–15.7)
HGB BLD-MCNC: 10.6 G/DL (ref 11.7–15.7)
HGB BLD-MCNC: 10.8 G/DL (ref 11.7–15.7)
IMM GRANULOCYTES # BLD: 0 10E3/UL
IMM GRANULOCYTES # BLD: 0 10E3/UL
IMM GRANULOCYTES NFR BLD: 0 %
IMM GRANULOCYTES NFR BLD: 0 %
INR PPP: 1.08 (ref 0.85–1.15)
IRON BINDING CAPACITY (ROCHE): 329 UG/DL (ref 240–430)
IRON SATN MFR SERPL: 27 % (ref 15–46)
IRON SERPL-MCNC: 90 UG/DL (ref 37–145)
LDH SERPL L TO P-CCNC: 231 U/L (ref 0–250)
LYMPHOCYTES # BLD AUTO: 0.4 10E3/UL (ref 0.8–5.3)
LYMPHOCYTES # BLD AUTO: 1.5 10E3/UL (ref 0.8–5.3)
LYMPHOCYTES NFR BLD AUTO: 16 %
LYMPHOCYTES NFR BLD AUTO: 6 %
MAGNESIUM SERPL-MCNC: 1.9 MG/DL (ref 1.7–2.3)
MCH RBC QN AUTO: 28.2 PG (ref 26.5–33)
MCH RBC QN AUTO: 28.5 PG (ref 26.5–33)
MCHC RBC AUTO-ENTMCNC: 32.2 G/DL (ref 31.5–36.5)
MCHC RBC AUTO-ENTMCNC: 32.4 G/DL (ref 31.5–36.5)
MCV RBC AUTO: 87 FL (ref 78–100)
MCV RBC AUTO: 88 FL (ref 78–100)
MONOCYTES # BLD AUTO: 0.1 10E3/UL (ref 0–1.3)
MONOCYTES # BLD AUTO: 0.7 10E3/UL (ref 0–1.3)
MONOCYTES NFR BLD AUTO: 2 %
MONOCYTES NFR BLD AUTO: 7 %
NEUTROPHILS # BLD AUTO: 6 10E3/UL (ref 1.6–8.3)
NEUTROPHILS # BLD AUTO: 7.1 10E3/UL (ref 1.6–8.3)
NEUTROPHILS NFR BLD AUTO: 77 %
NEUTROPHILS NFR BLD AUTO: 92 %
NRBC # BLD AUTO: 0 10E3/UL
NRBC # BLD AUTO: 0 10E3/UL
NRBC BLD AUTO-RTO: 0 /100
NRBC BLD AUTO-RTO: 0 /100
PLATELET # BLD AUTO: 176 10E3/UL (ref 150–450)
PLATELET # BLD AUTO: 177 10E3/UL (ref 150–450)
POTASSIUM SERPL-SCNC: 4.8 MMOL/L (ref 3.4–5.3)
RBC # BLD AUTO: 3.72 10E6/UL (ref 3.8–5.2)
RBC # BLD AUTO: 3.83 10E6/UL (ref 3.8–5.2)
RETICS # AUTO: 0.06 10E6/UL (ref 0.03–0.1)
RETICS # AUTO: 0.07 10E6/UL (ref 0.03–0.1)
RETICS/RBC NFR AUTO: 1.7 % (ref 0.5–2)
RETICS/RBC NFR AUTO: 1.9 % (ref 0.5–2)
SODIUM SERPL-SCNC: 136 MMOL/L (ref 135–145)
SPECIMEN EXPIRATION DATE: NORMAL
SPECIMEN EXPIRATION DATE: NORMAL
UFH PPP CHRO-ACNC: 0.12 IU/ML
UFH PPP CHRO-ACNC: 0.13 IU/ML
VIT B12 SERPL-MCNC: 897 PG/ML (ref 232–1245)
WBC # BLD AUTO: 6.5 10E3/UL (ref 4–11)
WBC # BLD AUTO: 9.3 10E3/UL (ref 4–11)

## 2023-10-06 PROCEDURE — 272N000194 HC ACCESSORY CR3

## 2023-10-06 PROCEDURE — 99152 MOD SED SAME PHYS/QHP 5/>YRS: CPT

## 2023-10-06 PROCEDURE — 99152 MOD SED SAME PHYS/QHP 5/>YRS: CPT | Mod: XE

## 2023-10-06 PROCEDURE — C1757 CATH, THROMBECTOMY/EMBOLECT: HCPCS | Performed by: SURGERY

## 2023-10-06 PROCEDURE — 36415 COLL VENOUS BLD VENIPUNCTURE: CPT | Performed by: RADIOLOGY

## 2023-10-06 PROCEDURE — 83010 ASSAY OF HAPTOGLOBIN QUANT: CPT | Performed by: INTERNAL MEDICINE

## 2023-10-06 PROCEDURE — 82728 ASSAY OF FERRITIN: CPT | Performed by: INTERNAL MEDICINE

## 2023-10-06 PROCEDURE — C1757 CATH, THROMBECTOMY/EMBOLECT: HCPCS

## 2023-10-06 PROCEDURE — 047P3ZZ DILATION OF RIGHT ANTERIOR TIBIAL ARTERY, PERCUTANEOUS APPROACH: ICD-10-PCS | Performed by: RADIOLOGY

## 2023-10-06 PROCEDURE — 250N000013 HC RX MED GY IP 250 OP 250 PS 637: Performed by: HOSPITALIST

## 2023-10-06 PROCEDURE — 200N000001 HC R&B ICU

## 2023-10-06 PROCEDURE — 85610 PROTHROMBIN TIME: CPT | Performed by: STUDENT IN AN ORGANIZED HEALTH CARE EDUCATION/TRAINING PROGRAM

## 2023-10-06 PROCEDURE — 86334 IMMUNOFIX E-PHORESIS SERUM: CPT | Performed by: STUDENT IN AN ORGANIZED HEALTH CARE EDUCATION/TRAINING PROGRAM

## 2023-10-06 PROCEDURE — 85045 AUTOMATED RETICULOCYTE COUNT: CPT | Performed by: INTERNAL MEDICINE

## 2023-10-06 PROCEDURE — 250N000013 HC RX MED GY IP 250 OP 250 PS 637

## 2023-10-06 PROCEDURE — 272N000001 HC OR GENERAL SUPPLY STERILE: Performed by: SURGERY

## 2023-10-06 PROCEDURE — 250N000011 HC RX IP 250 OP 636: Mod: JZ | Performed by: NURSE ANESTHETIST, CERTIFIED REGISTERED

## 2023-10-06 PROCEDURE — 258N000003 HC RX IP 258 OP 636: Performed by: NURSE ANESTHETIST, CERTIFIED REGISTERED

## 2023-10-06 PROCEDURE — 82784 ASSAY IGA/IGD/IGG/IGM EACH: CPT | Performed by: INTERNAL MEDICINE

## 2023-10-06 PROCEDURE — 85520 HEPARIN ASSAY: CPT | Performed by: RADIOLOGY

## 2023-10-06 PROCEDURE — 250N000025 HC SEVOFLURANE, PER MIN: Performed by: SURGERY

## 2023-10-06 PROCEDURE — 86901 BLOOD TYPING SEROLOGIC RH(D): CPT | Performed by: RADIOLOGY

## 2023-10-06 PROCEDURE — 250N000013 HC RX MED GY IP 250 OP 250 PS 637: Performed by: INTERNAL MEDICINE

## 2023-10-06 PROCEDURE — 85025 COMPLETE CBC W/AUTO DIFF WBC: CPT | Performed by: INTERNAL MEDICINE

## 2023-10-06 PROCEDURE — 3E05317 INTRODUCTION OF OTHER THROMBOLYTIC INTO PERIPHERAL ARTERY, PERCUTANEOUS APPROACH: ICD-10-PCS | Performed by: RADIOLOGY

## 2023-10-06 PROCEDURE — 86923 COMPATIBILITY TEST ELECTRIC: CPT | Performed by: SURGERY

## 2023-10-06 PROCEDURE — 88184 FLOWCYTOMETRY/ TC 1 MARKER: CPT | Performed by: INTERNAL MEDICINE

## 2023-10-06 PROCEDURE — 250N000011 HC RX IP 250 OP 636: Performed by: RADIOLOGY

## 2023-10-06 PROCEDURE — 83550 IRON BINDING TEST: CPT | Performed by: INTERNAL MEDICINE

## 2023-10-06 PROCEDURE — C1768 GRAFT, VASCULAR: HCPCS | Performed by: SURGERY

## 2023-10-06 PROCEDURE — 258N000003 HC RX IP 258 OP 636: Performed by: RADIOLOGY

## 2023-10-06 PROCEDURE — 84165 PROTEIN E-PHORESIS SERUM: CPT | Mod: 26

## 2023-10-06 PROCEDURE — 250N000011 HC RX IP 250 OP 636: Mod: JZ | Performed by: RADIOLOGY

## 2023-10-06 PROCEDURE — 86334 IMMUNOFIX E-PHORESIS SERUM: CPT | Mod: 26

## 2023-10-06 PROCEDURE — 88185 FLOWCYTOMETRY/TC ADD-ON: CPT | Performed by: INTERNAL MEDICINE

## 2023-10-06 PROCEDURE — 88188 FLOWCYTOMETRY/READ 9-15: CPT | Mod: GC | Performed by: STUDENT IN AN ORGANIZED HEALTH CARE EDUCATION/TRAINING PROGRAM

## 2023-10-06 PROCEDURE — 85018 HEMOGLOBIN: CPT | Performed by: RADIOLOGY

## 2023-10-06 PROCEDURE — 370N000017 HC ANESTHESIA TECHNICAL FEE, PER MIN: Performed by: SURGERY

## 2023-10-06 PROCEDURE — 37214 CESSJ THERAPY CATH REMOVAL: CPT

## 2023-10-06 PROCEDURE — 250N000011 HC RX IP 250 OP 636: Performed by: STUDENT IN AN ORGANIZED HEALTH CARE EDUCATION/TRAINING PROGRAM

## 2023-10-06 PROCEDURE — 250N000009 HC RX 250: Performed by: NURSE ANESTHETIST, CERTIFIED REGISTERED

## 2023-10-06 PROCEDURE — 82607 VITAMIN B-12: CPT | Performed by: INTERNAL MEDICINE

## 2023-10-06 PROCEDURE — 82746 ASSAY OF FOLIC ACID SERUM: CPT | Performed by: INTERNAL MEDICINE

## 2023-10-06 PROCEDURE — 84155 ASSAY OF PROTEIN SERUM: CPT | Performed by: INTERNAL MEDICINE

## 2023-10-06 PROCEDURE — 36415 COLL VENOUS BLD VENIPUNCTURE: CPT | Performed by: HOSPITALIST

## 2023-10-06 PROCEDURE — 99233 SBSQ HOSP IP/OBS HIGH 50: CPT | Mod: 57 | Performed by: SURGERY

## 2023-10-06 PROCEDURE — 85060 BLOOD SMEAR INTERPRETATION: CPT | Performed by: PATHOLOGY

## 2023-10-06 PROCEDURE — 85730 THROMBOPLASTIN TIME PARTIAL: CPT | Performed by: STUDENT IN AN ORGANIZED HEALTH CARE EDUCATION/TRAINING PROGRAM

## 2023-10-06 PROCEDURE — 255N000002 HC RX 255 OP 636: Performed by: RADIOLOGY

## 2023-10-06 PROCEDURE — 36415 COLL VENOUS BLD VENIPUNCTURE: CPT | Performed by: INTERNAL MEDICINE

## 2023-10-06 PROCEDURE — 04CP3ZZ EXTIRPATION OF MATTER FROM RIGHT ANTERIOR TIBIAL ARTERY, PERCUTANEOUS APPROACH: ICD-10-PCS | Performed by: RADIOLOGY

## 2023-10-06 PROCEDURE — C1769 GUIDE WIRE: HCPCS

## 2023-10-06 PROCEDURE — 250N000011 HC RX IP 250 OP 636: Mod: JZ | Performed by: PHYSICIAN ASSISTANT

## 2023-10-06 PROCEDURE — 85384 FIBRINOGEN ACTIVITY: CPT | Performed by: STUDENT IN AN ORGANIZED HEALTH CARE EDUCATION/TRAINING PROGRAM

## 2023-10-06 PROCEDURE — 86880 COOMBS TEST DIRECT: CPT | Performed by: INTERNAL MEDICINE

## 2023-10-06 PROCEDURE — 272N000302 HC DEVICE INFLATION CR5

## 2023-10-06 PROCEDURE — 80048 BASIC METABOLIC PNL TOTAL CA: CPT | Performed by: HOSPITALIST

## 2023-10-06 PROCEDURE — B41F1ZZ FLUOROSCOPY OF RIGHT LOWER EXTREMITY ARTERIES USING LOW OSMOLAR CONTRAST: ICD-10-PCS | Performed by: RADIOLOGY

## 2023-10-06 PROCEDURE — 83735 ASSAY OF MAGNESIUM: CPT | Performed by: HOSPITALIST

## 2023-10-06 PROCEDURE — 272N000127 HC CATH CR16

## 2023-10-06 PROCEDURE — 84165 PROTEIN E-PHORESIS SERUM: CPT | Mod: TC | Performed by: STUDENT IN AN ORGANIZED HEALTH CARE EDUCATION/TRAINING PROGRAM

## 2023-10-06 PROCEDURE — 99232 SBSQ HOSP IP/OBS MODERATE 35: CPT | Performed by: HOSPITALIST

## 2023-10-06 PROCEDURE — 83615 LACTATE (LD) (LDH) ENZYME: CPT | Performed by: INTERNAL MEDICINE

## 2023-10-06 PROCEDURE — 250N000011 HC RX IP 250 OP 636: Mod: JZ | Performed by: INTERNAL MEDICINE

## 2023-10-06 PROCEDURE — 272N000124 HC CATH CR11

## 2023-10-06 PROCEDURE — 272N000566 HC SHEATH CR3

## 2023-10-06 PROCEDURE — C1725 CATH, TRANSLUMIN NON-LASER: HCPCS

## 2023-10-06 PROCEDURE — 272N000116 HC CATH CR1

## 2023-10-06 PROCEDURE — 250N000009 HC RX 250: Performed by: PHYSICIAN ASSISTANT

## 2023-10-06 PROCEDURE — 360N000077 HC SURGERY LEVEL 4, PER MIN: Performed by: SURGERY

## 2023-10-06 PROCEDURE — 85025 COMPLETE CBC W/AUTO DIFF WBC: CPT | Performed by: RADIOLOGY

## 2023-10-06 RX ORDER — NALOXONE HYDROCHLORIDE 0.4 MG/ML
0.2 INJECTION, SOLUTION INTRAMUSCULAR; INTRAVENOUS; SUBCUTANEOUS
Status: DISCONTINUED | OUTPATIENT
Start: 2023-10-06 | End: 2023-10-14 | Stop reason: HOSPADM

## 2023-10-06 RX ORDER — HEPARIN SODIUM 10000 [USP'U]/100ML
500 INJECTION, SOLUTION INTRAVENOUS CONTINUOUS
Status: DISCONTINUED | OUTPATIENT
Start: 2023-10-06 | End: 2023-10-07

## 2023-10-06 RX ORDER — FLUMAZENIL 0.1 MG/ML
0.2 INJECTION, SOLUTION INTRAVENOUS
Status: DISCONTINUED | OUTPATIENT
Start: 2023-10-06 | End: 2023-10-06

## 2023-10-06 RX ORDER — METHYLPREDNISOLONE SODIUM SUCCINATE 40 MG/ML
40 INJECTION, POWDER, LYOPHILIZED, FOR SOLUTION INTRAMUSCULAR; INTRAVENOUS EVERY 4 HOURS
Status: COMPLETED | OUTPATIENT
Start: 2023-10-06 | End: 2023-10-06

## 2023-10-06 RX ORDER — IODIXANOL 320 MG/ML
50 INJECTION, SOLUTION INTRAVASCULAR ONCE
Status: COMPLETED | OUTPATIENT
Start: 2023-10-06 | End: 2023-10-06

## 2023-10-06 RX ORDER — CLINDAMYCIN PHOSPHATE 900 MG/50ML
900 INJECTION, SOLUTION INTRAVENOUS
Status: CANCELLED | OUTPATIENT
Start: 2023-10-06

## 2023-10-06 RX ORDER — NALOXONE HYDROCHLORIDE 0.4 MG/ML
0.4 INJECTION, SOLUTION INTRAMUSCULAR; INTRAVENOUS; SUBCUTANEOUS
Status: DISCONTINUED | OUTPATIENT
Start: 2023-10-06 | End: 2023-10-06

## 2023-10-06 RX ORDER — IODIXANOL 320 MG/ML
100 INJECTION, SOLUTION INTRAVASCULAR ONCE
Status: COMPLETED | OUTPATIENT
Start: 2023-10-06 | End: 2023-10-06

## 2023-10-06 RX ORDER — DIPHENHYDRAMINE HYDROCHLORIDE 50 MG/ML
50 INJECTION INTRAMUSCULAR; INTRAVENOUS ONCE
Status: COMPLETED | OUTPATIENT
Start: 2023-10-06 | End: 2023-10-06

## 2023-10-06 RX ORDER — NITROGLYCERIN 5 MG/ML
100-500 VIAL (ML) INTRAVENOUS EVERY 5 MIN PRN
Status: DISCONTINUED | OUTPATIENT
Start: 2023-10-06 | End: 2023-10-06

## 2023-10-06 RX ORDER — NALOXONE HYDROCHLORIDE 0.4 MG/ML
0.4 INJECTION, SOLUTION INTRAMUSCULAR; INTRAVENOUS; SUBCUTANEOUS
Status: DISCONTINUED | OUTPATIENT
Start: 2023-10-06 | End: 2023-10-14 | Stop reason: HOSPADM

## 2023-10-06 RX ORDER — LIDOCAINE 40 MG/G
CREAM TOPICAL
Status: DISCONTINUED | OUTPATIENT
Start: 2023-10-06 | End: 2023-10-06

## 2023-10-06 RX ORDER — FENTANYL CITRATE 50 UG/ML
25-50 INJECTION, SOLUTION INTRAMUSCULAR; INTRAVENOUS EVERY 5 MIN PRN
Status: DISCONTINUED | OUTPATIENT
Start: 2023-10-06 | End: 2023-10-06

## 2023-10-06 RX ORDER — HYDROMORPHONE HCL IN WATER/PF 6 MG/30 ML
0.2 PATIENT CONTROLLED ANALGESIA SYRINGE INTRAVENOUS EVERY 4 HOURS PRN
Status: DISCONTINUED | OUTPATIENT
Start: 2023-10-06 | End: 2023-10-07

## 2023-10-06 RX ORDER — NALOXONE HYDROCHLORIDE 0.4 MG/ML
0.2 INJECTION, SOLUTION INTRAMUSCULAR; INTRAVENOUS; SUBCUTANEOUS
Status: DISCONTINUED | OUTPATIENT
Start: 2023-10-06 | End: 2023-10-06

## 2023-10-06 RX ORDER — DIPHENHYDRAMINE HYDROCHLORIDE 50 MG/ML
50 INJECTION INTRAMUSCULAR; INTRAVENOUS ONCE
Status: DISCONTINUED | OUTPATIENT
Start: 2023-10-06 | End: 2023-10-06

## 2023-10-06 RX ORDER — HEPARIN SODIUM 200 [USP'U]/100ML
1 INJECTION, SOLUTION INTRAVENOUS CONTINUOUS PRN
Status: DISCONTINUED | OUTPATIENT
Start: 2023-10-06 | End: 2023-10-06

## 2023-10-06 RX ADMIN — OMEPRAZOLE 20 MG: 20 CAPSULE, DELAYED RELEASE ORAL at 13:21

## 2023-10-06 RX ADMIN — ALTEPLASE 2 MG: 2.2 INJECTION, POWDER, LYOPHILIZED, FOR SOLUTION INTRAVENOUS at 10:22

## 2023-10-06 RX ADMIN — HYDROMORPHONE HYDROCHLORIDE 0.2 MG: 0.2 INJECTION, SOLUTION INTRAMUSCULAR; INTRAVENOUS; SUBCUTANEOUS at 22:36

## 2023-10-06 RX ADMIN — MIDAZOLAM 1 MG: 1 INJECTION INTRAMUSCULAR; INTRAVENOUS at 10:52

## 2023-10-06 RX ADMIN — ALBUMIN HUMAN: 0.05 INJECTION, SOLUTION INTRAVENOUS at 23:55

## 2023-10-06 RX ADMIN — SODIUM CHLORIDE: 0.9 INJECTION, SOLUTION INTRAVENOUS at 23:55

## 2023-10-06 RX ADMIN — MIDAZOLAM 1 MG: 1 INJECTION INTRAMUSCULAR; INTRAVENOUS at 10:14

## 2023-10-06 RX ADMIN — HEPARIN SODIUM 5 BAG: 200 INJECTION, SOLUTION INTRAVENOUS at 09:30

## 2023-10-06 RX ADMIN — MIDAZOLAM 0.5 MG: 1 INJECTION INTRAMUSCULAR; INTRAVENOUS at 09:46

## 2023-10-06 RX ADMIN — NITROGLYCERIN 300 MCG: 10 INJECTION INTRAVENOUS at 10:27

## 2023-10-06 RX ADMIN — IODIXANOL 20 ML: 320 INJECTION, SOLUTION INTRAVASCULAR at 17:54

## 2023-10-06 RX ADMIN — FENTANYL CITRATE 25 MCG: 50 INJECTION, SOLUTION INTRAMUSCULAR; INTRAVENOUS at 09:46

## 2023-10-06 RX ADMIN — ALTEPLASE 0.5 MG/HR: 2.2 INJECTION, POWDER, LYOPHILIZED, FOR SOLUTION INTRAVENOUS at 16:50

## 2023-10-06 RX ADMIN — IODIXANOL 160 ML: 320 INJECTION, SOLUTION INTRAVASCULAR at 11:43

## 2023-10-06 RX ADMIN — CLINDAMYCIN PHOSPHATE 900 MG: 900 INJECTION, SOLUTION INTRAVENOUS at 23:58

## 2023-10-06 RX ADMIN — FENTANYL CITRATE 25 MCG: 50 INJECTION, SOLUTION INTRAMUSCULAR; INTRAVENOUS at 09:58

## 2023-10-06 RX ADMIN — MIDAZOLAM 0.5 MG: 1 INJECTION INTRAMUSCULAR; INTRAVENOUS at 09:58

## 2023-10-06 RX ADMIN — PHENYLEPHRINE HYDROCHLORIDE 100 MCG: 10 INJECTION INTRAVENOUS at 23:45

## 2023-10-06 RX ADMIN — LIDOCAINE HYDROCHLORIDE 10 ML: 10 INJECTION, SOLUTION INFILTRATION; PERINEURAL at 11:11

## 2023-10-06 RX ADMIN — OXYCODONE HYDROCHLORIDE 10 MG: 5 TABLET ORAL at 13:18

## 2023-10-06 RX ADMIN — SODIUM CHLORIDE: 0.9 INJECTION, SOLUTION INTRAVENOUS at 23:37

## 2023-10-06 RX ADMIN — OXYCODONE HYDROCHLORIDE 5 MG: 5 TABLET ORAL at 08:18

## 2023-10-06 RX ADMIN — NITROGLYCERIN 300 MCG: 10 INJECTION INTRAVENOUS at 10:52

## 2023-10-06 RX ADMIN — METHYLPREDNISOLONE SODIUM SUCCINATE 40 MG: 40 INJECTION, POWDER, FOR SOLUTION INTRAMUSCULAR; INTRAVENOUS at 13:25

## 2023-10-06 RX ADMIN — GABAPENTIN 100 MG: 100 CAPSULE ORAL at 13:22

## 2023-10-06 RX ADMIN — ALTEPLASE 2 MG: 2.2 INJECTION, POWDER, LYOPHILIZED, FOR SOLUTION INTRAVENOUS at 10:11

## 2023-10-06 RX ADMIN — ASPIRIN 81 MG 81 MG: 81 TABLET ORAL at 13:22

## 2023-10-06 RX ADMIN — MIDAZOLAM 2 MG: 1 INJECTION INTRAMUSCULAR; INTRAVENOUS at 11:33

## 2023-10-06 RX ADMIN — FLUTICASONE FUROATE AND VILANTEROL TRIFENATATE 1 PUFF: 200; 25 POWDER RESPIRATORY (INHALATION) at 13:22

## 2023-10-06 RX ADMIN — LIDOCAINE HYDROCHLORIDE 60 MG: 20 INJECTION, SOLUTION INFILTRATION; PERINEURAL at 23:45

## 2023-10-06 RX ADMIN — SODIUM CHLORIDE, POTASSIUM CHLORIDE, SODIUM LACTATE AND CALCIUM CHLORIDE: 600; 310; 30; 20 INJECTION, SOLUTION INTRAVENOUS at 23:55

## 2023-10-06 RX ADMIN — FENTANYL CITRATE 100 MCG: 50 INJECTION INTRAMUSCULAR; INTRAVENOUS at 23:45

## 2023-10-06 RX ADMIN — EMPAGLIFLOZIN 10 MG: 10 TABLET, FILM COATED ORAL at 13:22

## 2023-10-06 RX ADMIN — METHYLPREDNISOLONE SODIUM SUCCINATE 40 MG: 40 INJECTION, POWDER, FOR SOLUTION INTRAMUSCULAR; INTRAVENOUS at 06:29

## 2023-10-06 RX ADMIN — NITROGLYCERIN 400 MCG: 10 INJECTION INTRAVENOUS at 10:21

## 2023-10-06 RX ADMIN — OXYCODONE HYDROCHLORIDE 5 MG: 5 TABLET ORAL at 06:16

## 2023-10-06 RX ADMIN — NICOTINE 1 PATCH: 14 PATCH, EXTENDED RELEASE TRANSDERMAL at 13:21

## 2023-10-06 RX ADMIN — PHENYLEPHRINE HYDROCHLORIDE 200 MCG: 10 INJECTION INTRAVENOUS at 23:51

## 2023-10-06 RX ADMIN — MIDAZOLAM 0.5 MG: 1 INJECTION INTRAMUSCULAR; INTRAVENOUS at 11:05

## 2023-10-06 RX ADMIN — ALTEPLASE 1 MG: 2.2 INJECTION, POWDER, LYOPHILIZED, FOR SOLUTION INTRAVENOUS at 11:03

## 2023-10-06 RX ADMIN — HEPARIN SODIUM 500 UNITS/HR: 10000 INJECTION, SOLUTION INTRAVENOUS at 11:40

## 2023-10-06 RX ADMIN — ALTEPLASE 0.5 MG/HR: 2.2 INJECTION, POWDER, LYOPHILIZED, FOR SOLUTION INTRAVENOUS at 03:43

## 2023-10-06 RX ADMIN — TRIAMCINOLONE ACETONIDE: 0.25 CREAM TOPICAL at 13:22

## 2023-10-06 RX ADMIN — PROPOFOL 60 MG: 10 INJECTION, EMULSION INTRAVENOUS at 23:45

## 2023-10-06 RX ADMIN — FENTANYL CITRATE 50 MCG: 50 INJECTION, SOLUTION INTRAMUSCULAR; INTRAVENOUS at 10:39

## 2023-10-06 RX ADMIN — SUCCINYLCHOLINE CHLORIDE 100 MG: 20 INJECTION, SOLUTION INTRAMUSCULAR; INTRAVENOUS; PARENTERAL at 23:37

## 2023-10-06 RX ADMIN — MIDAZOLAM 0.5 MG: 1 INJECTION INTRAMUSCULAR; INTRAVENOUS at 11:17

## 2023-10-06 RX ADMIN — OXYCODONE HYDROCHLORIDE 10 MG: 5 TABLET ORAL at 00:01

## 2023-10-06 RX ADMIN — MIDAZOLAM 2 MG: 1 INJECTION INTRAMUSCULAR; INTRAVENOUS at 23:37

## 2023-10-06 RX ADMIN — NITROGLYCERIN 350 MCG: 10 INJECTION INTRAVENOUS at 11:03

## 2023-10-06 RX ADMIN — DIPHENHYDRAMINE HYDROCHLORIDE 50 MG: 50 INJECTION, SOLUTION INTRAMUSCULAR; INTRAVENOUS at 08:38

## 2023-10-06 ASSESSMENT — ACTIVITIES OF DAILY LIVING (ADL)
ADLS_ACUITY_SCORE: 28
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
## 2023-10-06 NOTE — IR NOTE
Patient Name: Shirley Hendricks  Medical Record Number: [id number]  Today's Date: 10/6/2023    Procedure: Angiogram through Catheter  Proceduralist: Dr. Rosenthal  Pathology present: no    Procedure Start: 0945  Procedure end: 1129  Sedation medications administered: Last Dose: 1133, Fentanyl 100 mcg, Versed 6 mg, Lidocaine 10 ml's.    Report given to: CARLO Greco RN  : no    Other Notes: Pt will continue bedrest post procedure. Pt arrived to IR room 1 from 369. Consent reviewed. Pt denies any questions or concerns regarding procedure. Pt positioned supine and monitored per protocol. Pt tolerated procedure without any noted complications.

## 2023-10-06 NOTE — PLAN OF CARE
Neuro: Alert and oriented. Baseline neuropathy.     Cardiac: SR/SB. Normotensive. RLE pulses palpable/dopplerable.     Respiratory: Lung sounds clear. Occasionally using 2L O2 via nasal cannula while asleep.     GI/: Voiding via purewick. Patient reports difficulty voiding while on flat bedrest. NPO all day for procedures.     Shift Events: Pt down to IR this morning, see IR notes. Back to ICU still on lytics. Patient returned to IR at 1700 for repeat RLE angio. Return to ICU at 1900.    Plan of Care: CT neck/chest/abd/pelvis ordered. Monitor groin site for bleeding.

## 2023-10-06 NOTE — PLAN OF CARE
Neuro:  A/Ox4, PERRLA.  Moves all extremities.  Follows commands.      Cardiovascular:  SB, ST changes in inferior leads, RRR.  Normotensive w/ PO Amlodipine/Lisinopril     Pulmonary:  Oxygen saturation > 92% on RA , O2 at 1lpm per NC when deeply asleep    GI: clears    :  Adequate UOP.    Endocrine: euglycemic     Skin:  Left radial site-CDI, CMS intact, protective mepilex applied at sacrum      Pain: back dt prolonged bedrest, repositioned, Oxycodone x1    Plan: lytics w/ TpA and heparin overnight, Repeat RLE Angio c possible intervention today, steroids given at 0629h, pt had some bleeding at sheath site- dressing changed PRN, Spoke w/ Dr FRANCES Carlson , heparin held, rpt hep Xa at MN, resumed heparin infusion. Stable hematology this AM.    AM labs noted; Family updated  Continue to monitor closely.    ROUTINE IP LABS (Last four results)  BMP  Recent Labs   Lab 10/06/23  0551 10/06/23  0023 10/05/23  0429 10/04/23  0619 10/03/23  0747 10/03/23  0737     --  132* 132*  --  131*   POTASSIUM 4.8  --  4.3 5.0  --  4.4   CHLORIDE 107  --  101 98  --  95*   SONIA 8.4*  --  8.7* 9.1  --  9.7   CO2 18*  --  18* 20*  --  22   BUN 18.9  --  21.9 17.0  --  17.5   CR 0.72  --  0.76 0.73 0.9 0.77   GLC 78 98 120* 132*  --  101*     CBC  Recent Labs   Lab 10/06/23  0551 10/05/23  0429 10/04/23  0619 10/03/23  1118 10/03/23  0737   WBC 9.3  --  5.1 8.8 9.6   RBC 3.83  --  4.75 4.61 4.98   HGB 10.8* 11.5* 13.4 12.9 14.0   HCT 33.3*  --  40.5 39.1 42.3   MCV 87  --  85 85 85   MCH 28.2  --  28.2 28.0 28.1   MCHC 32.4  --  33.1 33.0 33.1   RDW 14.9  --  14.4 14.5 14.5     --  215 213 226     INR  Recent Labs   Lab 10/06/23  0551 10/05/23  1014   INR 1.08 0.95      10/06/23 05:51   INR 1.08   PTT 41 (H)   Fibrinogen 236   (H): Data is abnormally high     10/05/23 19:20 10/06/00:02 10/06/23 05:51   Heparin Unfractionated Anti Xa Level >1.10 (HH) 0.12 0.13       Intake/Output Summary (Last 24 hours) at 10/6/2023 0658  Last  data filed at 10/6/2023 0600  Gross per 24 hour   Intake 1687.87 ml   Output 220 ml   Net 1467.87 ml     Goal Outcome Evaluation: in progress

## 2023-10-06 NOTE — PRE-PROCEDURE
GENERAL PRE-PROCEDURE:   Procedure:  Right lower extremity angiogram through catheter with possible intervention  Date/Time:  10/6/2023 9:21 AM    Written consent obtained?: Yes    Risks and benefits: Risks, benefits and alternatives were discussed    Consent given by:  Patient  Patient states understanding of procedure being performed: Yes    Patient's understanding of procedure matches consent: Yes    Procedure consent matches procedure scheduled: Yes    Expected level of sedation:  Moderate  Appropriately NPO:  Yes  ASA Class:  3  Mallampati  :  Grade 2- soft palate, base of uvula, tonsillar pillars, and portion of posterior pharyngeal wall visible  Lungs:  Lungs clear with good breath sounds bilaterally  Heart:  Normal heart sounds and rate  History & Physical reviewed:  History and physical reviewed and no updates needed  Statement of review:  I have reviewed the lab findings, diagnostic data, medications, and the plan for sedation    Vineet Haro PA-C  Interventional Radiology  [phone] (IR)  *[phone] ([facility] Office)

## 2023-10-06 NOTE — PROGRESS NOTES
VASCULAR SURGERY    Called emergently to see in IR by Dr Rosenthal.  Sheath thrombus clotted graft and AT.  Lytic long infusion catheter placed to distal AT graft but not in AT.    Now In ICU  Comfortable.  +3 graft and DP with Biphasic DP Doppler.    IR check later this afternoon.      Chadwick Lozano MD

## 2023-10-06 NOTE — IR NOTE
Patient Name: Shirley Hendricks  Medical Record Number: [id number]  Today's Date: 10/6/2023    Procedure: Angiogram though catheter follow up  Proceduralist: Dr. Rosenthal  Pathology present: no    Procedure Start: 1726  Procedure end: 1740  Sedation medications administered: NONE  Report given to: CARLO Greco RN  : no    Other Notes: Pt will require 13.5 bedrest post procedure through the night till 7 am and Pt is able to eat per Dr. Lozano and IR MD. Pt arrived to IR room 2 from [facility]. Consent reviewed. Pt denies any questions or concerns regarding procedure. Pt positioned supine and monitored per protocol. Pt tolerated procedure without any noted complications.

## 2023-10-06 NOTE — PROGRESS NOTES
"Vascular Surgery Progress Note    Initiated catheter-directed lytic therapy last afternoon via antegrade right femoral access. Some oozing from right groin dressing noted overnight with supratherapeutic anti Xa level and no concerns for hematoma, RN discussed with IR, heparin drip through sheath paused and resumed, now with appropriate antiXa and no further bleeding. Shirley reports R foot numbness is now back to baseline.    /64   Pulse 50   Temp 97.6  F (36.4  C) (Oral)   Resp 15   Ht 1.6 m (5' 3\")   Wt 54.3 kg (119 lb 12.8 oz)   LMP  (LMP Unknown)   SpO2 97%   BMI 21.22 kg/m      Laying in bed, comfortable  NLB on RA  Sheath in R groin, dressing clean, dry, intact, no bleeding or hematoma  Palpable jump graft pulse over the right tibia and palpable right DP pulse with strong doppler signal. Strong monophasic doppler signals in contralateral left DP and PT      Hgb 11.5  Na 132  Cr 0.76    Assessment & Plan:  63 yo female admitted with NSTEMI vs cardiomyopathy and occluded right anterior tibial jump graft, now day 1 of catheter-directed lytic therapy with palpable graft and DP pulses. Some oozing from groin site, now resolved and no concerns for hematoma or ongoing bleeding.    Plan for repeat angiography today. Does not have time but will plan on another dose of steroids now and another dose 2 hr prior to procedure with benadryl.  Continue heparin drip at 500 u/hr through sheath and TPA at 0.5 mg/h through catheter  Serial pulse and groin checks    Yamil Villagomez MD    "

## 2023-10-06 NOTE — IR NOTE
RADIOLOGY POST PROCEDURE NOTE    Patient name: Shirley Hendricks  MRN: [id number]  : 1960    Pre-procedure diagnosis:  RLE angiogram and completion of thrombolysis.  Graft and ISIDRO are patent, as are DP and arteries in the foot.  Post-procedure diagnosis: Same    Procedure Date/Time: 2023  6:22 PM  Procedure: RLE angiogram with patent vessels, including ISIDRO and arteries in foot.  Cannot use closure device.    Patient at high risk for bleeding given need for aggressive anticoagulation.  Bedrest for 12 hours.  Puncture site is DISTAL to access site, so pressure to be held distal to puncture (antegrade access).    Heparin to restart at 8 PM, no bolus, and if no signs of bleeding.  Patient will need to be monitored for frequent groin checks and manual pressure applied for any bleeding.        Estimated blood loss: 50 ml  Specimen(s) collected with description: All sheaths and catheters removed.    The patient tolerated the procedure well with no immediate complications.  Significant findings:  Please see above.     See imaging dictation for procedural details.    Provider name: Jordan Rosenthal MD  Assistant(s):None

## 2023-10-06 NOTE — IR NOTE
RADIOLOGY POST PROCEDURE NOTE    Patient name: Shirley Hendricks  MRN: [id number]  : 1960    Pre-procedure diagnosis: RLE surgical graft thrombosis and thrombolysis  Post-procedure diagnosis: Same    Procedure Date/Time: 2023  11:36 AM  Procedure:    Patient pre-treated for contrast allergy.  RLE angiogram performed through the infusion catheter demonstrating patency of the graft and single vessel runoff artery (ISIDRO).  Sheath is long and extends to the level of mid to distal SFA level.  Sheath was exchanged (antegrade puncture) and it was discovered there was thrombus along the sheath which dislodged distally, into the graft, ISIDRO, and foot.    Additional thrombectomy performed with mechanical thrombectomy possis device (expect hematuria).  1350 mCG of nitroglycerin given throughout the procedure as the ISIDRO is spastic throughout exam.  Nitroglycerin and tPA given into ISIDRO and plantar arterial branches.  6 mg of tPA injected irectly into arteries by completion.    3 mm PTA performed at distal anastomosis.  Stagnant column of contrast in the bypass and throughout majority of the RLE by completion, due to poor outflow in the foot.    Ultimately, will continue tPA with 50 cm infusion catheter throughout graft.  Short sheath palced and will give heparin through sheath at 500 U/hour.  0.5 mg/hour of tPA.        Estimated blood loss: 25 ml  Specimen(s) collected with description: Previous long steah exchnaged for short.    The patient tolerated the procedure well with no immediate complications.  Significant findings:  See above.  Foot is ischemic.  Hopefully ISIDRO will stop spasming and tPA will have effect.    Folllow up later today at some point.  Keep NPO.    See imaging dictation for procedural details.    Provider name: Jordan Rosenthal MD  Assistant(s):None

## 2023-10-06 NOTE — PROGRESS NOTES
Vascular Surgery Progress Note    S: Good night in ICU.  Comfortable.  Family present    O:   Vitals:  BP  Min: 94/52  Max: 152/80  Temp  Av.1  F (36.7  C)  Min: 97.6  F (36.4  C)  Max: 98.5  F (36.9  C)  Pulse  Av.6  Min: 47  Max: 67  I/O last 3 completed shifts:  In: 1687.87 [P.O.:200; I.V.:1487.87]  Out: 220 [Urine:220]    Physical Exam: Alert and appropriate.  Very comfortable.  Talkative.    +3 right distal graft pulse and DP pulse    Foot warm and pink with normal CMS.  No swelling.        Assessment/Plan: #1.  Right graft is patent.  We will go down to interventional radiology for completion angiogram and possible angioplasty this morning and she is will be removed along with TPN.     #2.  With this episodes of clotting she will need more aggressive anticoagulation.  Recommended Xarelto and aspirin.  However, we will keep her on heparin postprocedure since ENT needs to do a biopsy on her right jaw for suspected lymphoma and hopefully this can be performed.      Wm. Blake MD

## 2023-10-06 NOTE — CONSULTS
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 dosing DVT and PE.  This test is not validated for other direct factor X inhibitors (e.g. rivaroxaban, apixaban, edoxaban, betrixaban, fondaparinux) and should not be used for monitoring of other medications.   Glucose by meter   Result Value Ref Range    GLUCOSE BY METER POCT 98 70 - 99 mg/dL   CBC with platelets differential    Narrative    The following orders were created for panel order CBC with platelets differential.  Procedure                               Abnormality         Status                     ---------                               -----------         ------                     CBC with platelets and d...[[id number]]  Abnormal            Final result                 Please view results for these tests on the individual orders.   Magnesium   Result Value Ref Range    Magnesium 1.9 1.7 - 2.3 mg/dL   Basic metabolic panel   Result Value Ref Range    Sodium 136 135 - 145 mmol/L    Potassium 4.8 3.4 - 5.3 mmol/L    Chloride 107 98 - 107 mmol/L    Carbon Dioxide (CO2) 18 (L) 22 - 29 mmol/L    Anion Gap 11 7 - 15 mmol/L    Urea Nitrogen 18.9 8.0 - 23.0 mg/dL    Creatinine 0.72 0.51 - 0.95 mg/dL    GFR Estimate >90 >60 mL/min/1.73m2    Calcium  8.4 (L) 8.8 - 10.2 mg/dL    Glucose 78 70 - 99 mg/dL   Heparin Unfractionated Anti Xa Level   Result Value Ref Range    Anti Xa Unfractionated Heparin 0.13 For Reference Range, See Comment IU/mL    Narrative    Therapeutic Range: UFH: 0.25-0.50 IU/mL for low intensity dosing,  0.30-0.70 IU/mL for high intensity dosing DVT and PE.  This test is not validated for other direct factor X inhibitors (e.g. rivaroxaban, apixaban, edoxaban, betrixaban, fondaparinux) and should not be used for monitoring of other medications.   CBC with platelets and differential   Result Value Ref Range    WBC Count 9.3 4.0 - 11.0 10e3/uL    RBC Count 3.83 3.80 - 5.20 10e6/uL    Hemoglobin 10.8 (L) 11.7 - 15.7 g/dL    Hematocrit 33.3 (L) 35.0 - 47.0 %    MCV 87 78 - 100 fL    MCH 28.2 26.5 - 33.0 pg    MCHC 32.4 31.5 - 36.5 g/dL    RDW 14.9 10.0 - 15.0 %    Platelet Count 177 150 - 450 10e3/uL    % Neutrophils 77 %    % Lymphocytes 16 %    % Monocytes 7 %    Mids % (Monos, Eos, Basos)      % Eosinophils 0 %    % Basophils 0 %    % Immature Granulocytes 0 %    NRBCs per 100 WBC 0 <1 /100    Absolute Neutrophils 7.1 1.6 - 8.3 10e3/uL    Absolute Lymphocytes 1.5 0.8 - 5.3 10e3/uL    Absolute Monocytes 0.7 0.0 - 1.3 10e3/uL    Mids Abs (Monos, Eos, Basos)      Absolute Eosinophils 0.0 0.0 - 0.7 10e3/uL    Absolute Basophils 0.0 0.0 - 0.2 10e3/uL    Absolute Immature Granulocytes 0.0 <=0.4 10e3/uL    Absolute NRBCs 0.0 10e3/uL   INR   Result Value Ref Range    INR 1.08 0.85 - 1.15   Partial thromboplastin time   Result Value Ref Range    aPTT 41 (H) 22 - 38 Seconds   Fibrinogen activity   Result Value Ref Range    Fibrinogen Activity 236 170 - 490 mg/dL   Direct antiglobulin test    Narrative    The following orders were created for panel order Direct antiglobulin test.  Procedure                               Abnormality         Status                     ---------                               -----------         ------                      Direct antiglobulin test...[[id number]]                      In process                   Please view results for these tests on the individual orders.   Glucose by meter   Result Value Ref Range    GLUCOSE BY METER POCT 101 (H) 70 - 99 mg/dL     *Note: Due to a large number of results and/or encounters for the requested time period, some results have not been displayed. A complete set of results can be found in Results Review.

## 2023-10-07 ENCOUNTER — APPOINTMENT (OUTPATIENT)
Dept: CT IMAGING | Facility: CLINIC | Age: 63
DRG: 270 | End: 2023-10-07
Attending: STUDENT IN AN ORGANIZED HEALTH CARE EDUCATION/TRAINING PROGRAM
Payer: COMMERCIAL

## 2023-10-07 LAB
ACANTHOCYTES BLD QL SMEAR: ABNORMAL
ACANTHOCYTES BLD QL SMEAR: NORMAL
ALBUMIN SERPL BCG-MCNC: 2.9 G/DL (ref 3.5–5.2)
ALP SERPL-CCNC: 35 U/L (ref 35–104)
ALT SERPL W P-5'-P-CCNC: 15 U/L (ref 0–50)
ANION GAP SERPL CALCULATED.3IONS-SCNC: 15 MMOL/L (ref 7–15)
APTT PPP: 40 SECONDS (ref 22–38)
APTT PPP: 44 SECONDS (ref 22–38)
APTT PPP: 49 SECONDS (ref 22–38)
AST SERPL W P-5'-P-CCNC: 18 U/L (ref 0–45)
AUER BODIES BLD QL SMEAR: ABNORMAL
AUER BODIES BLD QL SMEAR: NORMAL
BASO STIPL BLD QL SMEAR: ABNORMAL
BASO STIPL BLD QL SMEAR: NORMAL
BASO+EOS+MONOS # BLD AUTO: ABNORMAL 10*3/UL
BASO+EOS+MONOS NFR BLD AUTO: ABNORMAL %
BASOPHILS # BLD AUTO: 0 10E3/UL (ref 0–0.2)
BASOPHILS NFR BLD AUTO: 0 %
BILIRUB DIRECT SERPL-MCNC: <0.2 MG/DL (ref 0–0.3)
BILIRUB SERPL-MCNC: 0.2 MG/DL
BITE CELLS BLD QL SMEAR: ABNORMAL
BITE CELLS BLD QL SMEAR: NORMAL
BLD PROD TYP BPU: NORMAL
BLD PROD TYP BPU: NORMAL
BLISTER CELLS BLD QL SMEAR: ABNORMAL
BLISTER CELLS BLD QL SMEAR: NORMAL
BLOOD COMPONENT TYPE: NORMAL
BLOOD COMPONENT TYPE: NORMAL
BUN SERPL-MCNC: 21.2 MG/DL (ref 8–23)
BURR CELLS BLD QL SMEAR: ABNORMAL
BURR CELLS BLD QL SMEAR: NORMAL
CALCIUM SERPL-MCNC: 7.5 MG/DL (ref 8.8–10.2)
CHLORIDE SERPL-SCNC: 106 MMOL/L (ref 98–107)
CODING SYSTEM: NORMAL
CODING SYSTEM: NORMAL
CREAT SERPL-MCNC: 0.77 MG/DL (ref 0.51–0.95)
CROSSMATCH: NORMAL
CROSSMATCH: NORMAL
DACRYOCYTES BLD QL SMEAR: ABNORMAL
DACRYOCYTES BLD QL SMEAR: NORMAL
DEPRECATED HCO3 PLAS-SCNC: 15 MMOL/L (ref 22–29)
EGFRCR SERPLBLD CKD-EPI 2021: 86 ML/MIN/1.73M2
ELLIPTOCYTES BLD QL SMEAR: ABNORMAL
ELLIPTOCYTES BLD QL SMEAR: NORMAL
EOSINOPHIL # BLD AUTO: 0 10E3/UL (ref 0–0.7)
EOSINOPHIL NFR BLD AUTO: 0 %
ERYTHROCYTE [DISTWIDTH] IN BLOOD BY AUTOMATED COUNT: 15 % (ref 10–15)
ERYTHROCYTE [DISTWIDTH] IN BLOOD BY AUTOMATED COUNT: 15.7 % (ref 10–15)
ERYTHROCYTE [DISTWIDTH] IN BLOOD BY AUTOMATED COUNT: 15.8 % (ref 10–15)
FIBRINOGEN PPP-MCNC: 118 MG/DL (ref 170–490)
FRAGMENTS BLD QL SMEAR: NORMAL
FRAGMENTS BLD QL SMEAR: SLIGHT
GLUCOSE BLDC GLUCOMTR-MCNC: 74 MG/DL (ref 70–99)
GLUCOSE SERPL-MCNC: 80 MG/DL (ref 70–99)
HCT VFR BLD AUTO: 23.2 % (ref 35–47)
HCT VFR BLD AUTO: 23.7 % (ref 35–47)
HCT VFR BLD AUTO: 30 % (ref 35–47)
HGB BLD-MCNC: 10 G/DL (ref 11.7–15.7)
HGB BLD-MCNC: 7.9 G/DL (ref 11.7–15.7)
HGB BLD-MCNC: 8 G/DL (ref 11.7–15.7)
HGB C CRYSTALS: ABNORMAL
HGB C CRYSTALS: NORMAL
HOWELL-JOLLY BOD BLD QL SMEAR: ABNORMAL
HOWELL-JOLLY BOD BLD QL SMEAR: NORMAL
IMM GRANULOCYTES # BLD: 0 10E3/UL
IMM GRANULOCYTES NFR BLD: 1 %
INR PPP: 1.38 (ref 0.85–1.15)
ISSUE DATE AND TIME: NORMAL
ISSUE DATE AND TIME: NORMAL
LYMPHOCYTES # BLD AUTO: 1.6 10E3/UL (ref 0.8–5.3)
LYMPHOCYTES NFR BLD AUTO: 21 %
MAGNESIUM SERPL-MCNC: 1.7 MG/DL (ref 1.7–2.3)
MCH RBC QN AUTO: 28.8 PG (ref 26.5–33)
MCH RBC QN AUTO: 29.3 PG (ref 26.5–33)
MCH RBC QN AUTO: 29.4 PG (ref 26.5–33)
MCHC RBC AUTO-ENTMCNC: 33.3 G/DL (ref 31.5–36.5)
MCHC RBC AUTO-ENTMCNC: 33.8 G/DL (ref 31.5–36.5)
MCHC RBC AUTO-ENTMCNC: 34.1 G/DL (ref 31.5–36.5)
MCV RBC AUTO: 86 FL (ref 78–100)
MCV RBC AUTO: 87 FL (ref 78–100)
MCV RBC AUTO: 87 FL (ref 78–100)
MONOCYTES # BLD AUTO: 0.8 10E3/UL (ref 0–1.3)
MONOCYTES NFR BLD AUTO: 10 %
NEUTROPHILS # BLD AUTO: 5.2 10E3/UL (ref 1.6–8.3)
NEUTROPHILS NFR BLD AUTO: 68 %
NEUTS HYPERSEG BLD QL SMEAR: ABNORMAL
NEUTS HYPERSEG BLD QL SMEAR: NORMAL
NRBC # BLD AUTO: 0 10E3/UL
NRBC BLD AUTO-RTO: 0 /100
PLAT MORPH BLD: ABNORMAL
PLAT MORPH BLD: NORMAL
PLATELET # BLD AUTO: 103 10E3/UL (ref 150–450)
PLATELET # BLD AUTO: 120 10E3/UL (ref 150–450)
PLATELET # BLD AUTO: 72 10E3/UL (ref 150–450)
POLYCHROMASIA BLD QL SMEAR: ABNORMAL
POLYCHROMASIA BLD QL SMEAR: NORMAL
POTASSIUM SERPL-SCNC: 4.6 MMOL/L (ref 3.4–5.3)
PROT SERPL-MCNC: 4.3 G/DL (ref 6.4–8.3)
RBC # BLD AUTO: 2.69 10E6/UL (ref 3.8–5.2)
RBC # BLD AUTO: 2.73 10E6/UL (ref 3.8–5.2)
RBC # BLD AUTO: 3.47 10E6/UL (ref 3.8–5.2)
RBC AGGLUT BLD QL: ABNORMAL
RBC AGGLUT BLD QL: NORMAL
RBC MORPH BLD: ABNORMAL
RBC MORPH BLD: NORMAL
ROULEAUX BLD QL SMEAR: ABNORMAL
ROULEAUX BLD QL SMEAR: NORMAL
SICKLE CELLS BLD QL SMEAR: ABNORMAL
SICKLE CELLS BLD QL SMEAR: NORMAL
SMUDGE CELLS BLD QL SMEAR: ABNORMAL
SMUDGE CELLS BLD QL SMEAR: NORMAL
SODIUM SERPL-SCNC: 136 MMOL/L (ref 135–145)
SPHEROCYTES BLD QL SMEAR: ABNORMAL
SPHEROCYTES BLD QL SMEAR: NORMAL
STOMATOCYTES BLD QL SMEAR: ABNORMAL
STOMATOCYTES BLD QL SMEAR: NORMAL
TARGETS BLD QL SMEAR: ABNORMAL
TARGETS BLD QL SMEAR: NORMAL
TOTAL PROTEIN SERUM FOR ELP: 5.7 G/DL (ref 6.4–8.3)
TOXIC GRANULES BLD QL SMEAR: ABNORMAL
TOXIC GRANULES BLD QL SMEAR: NORMAL
UFH PPP CHRO-ACNC: 0.15 IU/ML
UFH PPP CHRO-ACNC: <0.1 IU/ML
UNIT ABO/RH: NORMAL
UNIT ABO/RH: NORMAL
UNIT NUMBER: NORMAL
UNIT NUMBER: NORMAL
UNIT STATUS: NORMAL
UNIT STATUS: NORMAL
UNIT TYPE ISBT: 6200
UNIT TYPE ISBT: 6200
VARIANT LYMPHS BLD QL SMEAR: ABNORMAL
VARIANT LYMPHS BLD QL SMEAR: NORMAL
WBC # BLD AUTO: 6.1 10E3/UL (ref 4–11)
WBC # BLD AUTO: 6.6 10E3/UL (ref 4–11)
WBC # BLD AUTO: 7.7 10E3/UL (ref 4–11)

## 2023-10-07 PROCEDURE — 85520 HEPARIN ASSAY: CPT | Performed by: STUDENT IN AN ORGANIZED HEALTH CARE EDUCATION/TRAINING PROGRAM

## 2023-10-07 PROCEDURE — 80053 COMPREHEN METABOLIC PANEL: CPT | Performed by: STUDENT IN AN ORGANIZED HEALTH CARE EDUCATION/TRAINING PROGRAM

## 2023-10-07 PROCEDURE — 250N000013 HC RX MED GY IP 250 OP 250 PS 637: Performed by: STUDENT IN AN ORGANIZED HEALTH CARE EDUCATION/TRAINING PROGRAM

## 2023-10-07 PROCEDURE — 258N000003 HC RX IP 258 OP 636: Performed by: NURSE ANESTHETIST, CERTIFIED REGISTERED

## 2023-10-07 PROCEDURE — P9016 RBC LEUKOCYTES REDUCED: HCPCS | Performed by: SURGERY

## 2023-10-07 PROCEDURE — 35666 BPG FEM-ANT TIB PST TIB/PRNL: CPT | Mod: 22 | Performed by: SURGERY

## 2023-10-07 PROCEDURE — 85384 FIBRINOGEN ACTIVITY: CPT | Performed by: STUDENT IN AN ORGANIZED HEALTH CARE EDUCATION/TRAINING PROGRAM

## 2023-10-07 PROCEDURE — 06BM0ZZ EXCISION OF RIGHT FEMORAL VEIN, OPEN APPROACH: ICD-10-PCS | Performed by: SURGERY

## 2023-10-07 PROCEDURE — 34203 REMOVAL OF LEG ARTERY CLOT: CPT | Mod: RT | Performed by: SURGERY

## 2023-10-07 PROCEDURE — 04CK0ZZ EXTIRPATION OF MATTER FROM RIGHT FEMORAL ARTERY, OPEN APPROACH: ICD-10-PCS | Performed by: SURGERY

## 2023-10-07 PROCEDURE — 71250 CT THORAX DX C-: CPT

## 2023-10-07 PROCEDURE — 99232 SBSQ HOSP IP/OBS MODERATE 35: CPT | Performed by: HOSPITALIST

## 2023-10-07 PROCEDURE — 82248 BILIRUBIN DIRECT: CPT | Performed by: STUDENT IN AN ORGANIZED HEALTH CARE EDUCATION/TRAINING PROGRAM

## 2023-10-07 PROCEDURE — 04CP0ZZ EXTIRPATION OF MATTER FROM RIGHT ANTERIOR TIBIAL ARTERY, OPEN APPROACH: ICD-10-PCS | Performed by: SURGERY

## 2023-10-07 PROCEDURE — 85730 THROMBOPLASTIN TIME PARTIAL: CPT | Performed by: STUDENT IN AN ORGANIZED HEALTH CARE EDUCATION/TRAINING PROGRAM

## 2023-10-07 PROCEDURE — 258N000003 HC RX IP 258 OP 636: Performed by: STUDENT IN AN ORGANIZED HEALTH CARE EDUCATION/TRAINING PROGRAM

## 2023-10-07 PROCEDURE — 250N000011 HC RX IP 250 OP 636: Mod: JZ

## 2023-10-07 PROCEDURE — 85610 PROTHROMBIN TIME: CPT | Performed by: STUDENT IN AN ORGANIZED HEALTH CARE EDUCATION/TRAINING PROGRAM

## 2023-10-07 PROCEDURE — 36415 COLL VENOUS BLD VENIPUNCTURE: CPT | Performed by: STUDENT IN AN ORGANIZED HEALTH CARE EDUCATION/TRAINING PROGRAM

## 2023-10-07 PROCEDURE — 85025 COMPLETE CBC W/AUTO DIFF WBC: CPT | Performed by: STUDENT IN AN ORGANIZED HEALTH CARE EDUCATION/TRAINING PROGRAM

## 2023-10-07 PROCEDURE — 35876 REMOVAL OF CLOT IN GRAFT: CPT | Mod: RT | Performed by: SURGERY

## 2023-10-07 PROCEDURE — 250N000011 HC RX IP 250 OP 636: Mod: JZ | Performed by: STUDENT IN AN ORGANIZED HEALTH CARE EDUCATION/TRAINING PROGRAM

## 2023-10-07 PROCEDURE — 85027 COMPLETE CBC AUTOMATED: CPT | Performed by: STUDENT IN AN ORGANIZED HEALTH CARE EDUCATION/TRAINING PROGRAM

## 2023-10-07 PROCEDURE — 70490 CT SOFT TISSUE NECK W/O DYE: CPT

## 2023-10-07 PROCEDURE — 04UP07Z SUPPLEMENT RIGHT ANTERIOR TIBIAL ARTERY WITH AUTOLOGOUS TISSUE SUBSTITUTE, OPEN APPROACH: ICD-10-PCS | Performed by: SURGERY

## 2023-10-07 PROCEDURE — 250N000009 HC RX 250: Performed by: SURGERY

## 2023-10-07 PROCEDURE — P9045 ALBUMIN (HUMAN), 5%, 250 ML: HCPCS | Mod: JZ | Performed by: NURSE ANESTHETIST, CERTIFIED REGISTERED

## 2023-10-07 PROCEDURE — 250N000011 HC RX IP 250 OP 636: Mod: JZ | Performed by: NURSE ANESTHETIST, CERTIFIED REGISTERED

## 2023-10-07 PROCEDURE — 200N000001 HC R&B ICU

## 2023-10-07 PROCEDURE — 250N000011 HC RX IP 250 OP 636: Performed by: SURGERY

## 2023-10-07 PROCEDURE — 83735 ASSAY OF MAGNESIUM: CPT | Performed by: STUDENT IN AN ORGANIZED HEALTH CARE EDUCATION/TRAINING PROGRAM

## 2023-10-07 PROCEDURE — 258N000003 HC RX IP 258 OP 636: Performed by: SURGERY

## 2023-10-07 PROCEDURE — 250N000009 HC RX 250: Performed by: NURSE ANESTHETIST, CERTIFIED REGISTERED

## 2023-10-07 PROCEDURE — 041K0AQ BYPASS RIGHT FEMORAL ARTERY TO LOWER EXTREMITY ARTERY WITH AUTOLOGOUS ARTERIAL TISSUE, OPEN APPROACH: ICD-10-PCS | Performed by: SURGERY

## 2023-10-07 DEVICE — PROPATEN VASCULAR GRAFT TW RR 6MMX70CM 60CM RINGS HEPARIN
Type: IMPLANTABLE DEVICE | Site: LEG | Status: FUNCTIONAL
Brand: GORE PROPATEN VASCULAR GRAFT

## 2023-10-07 RX ORDER — HEPARIN SODIUM 1000 [USP'U]/ML
INJECTION, SOLUTION INTRAVENOUS; SUBCUTANEOUS PRN
Status: DISCONTINUED | OUTPATIENT
Start: 2023-10-07 | End: 2023-10-07

## 2023-10-07 RX ORDER — EPHEDRINE SULFATE 50 MG/ML
INJECTION, SOLUTION INTRAMUSCULAR; INTRAVENOUS; SUBCUTANEOUS PRN
Status: DISCONTINUED | OUTPATIENT
Start: 2023-10-07 | End: 2023-10-07

## 2023-10-07 RX ORDER — AMOXICILLIN 250 MG
1 CAPSULE ORAL 2 TIMES DAILY
Status: DISCONTINUED | OUTPATIENT
Start: 2023-10-07 | End: 2023-10-13

## 2023-10-07 RX ORDER — SODIUM CHLORIDE, SODIUM LACTATE, POTASSIUM CHLORIDE, CALCIUM CHLORIDE 600; 310; 30; 20 MG/100ML; MG/100ML; MG/100ML; MG/100ML
INJECTION, SOLUTION INTRAVENOUS CONTINUOUS PRN
Status: DISCONTINUED | OUTPATIENT
Start: 2023-10-06 | End: 2023-10-07

## 2023-10-07 RX ORDER — SODIUM CHLORIDE 9 MG/ML
INJECTION, SOLUTION INTRAVENOUS CONTINUOUS PRN
Status: DISCONTINUED | OUTPATIENT
Start: 2023-10-06 | End: 2023-10-07

## 2023-10-07 RX ORDER — LIDOCAINE 40 MG/G
CREAM TOPICAL
Status: DISCONTINUED | OUTPATIENT
Start: 2023-10-07 | End: 2023-10-11

## 2023-10-07 RX ORDER — FENTANYL CITRATE 50 UG/ML
INJECTION, SOLUTION INTRAMUSCULAR; INTRAVENOUS PRN
Status: DISCONTINUED | OUTPATIENT
Start: 2023-10-06 | End: 2023-10-07

## 2023-10-07 RX ORDER — HYDROMORPHONE HCL IN WATER/PF 6 MG/30 ML
0.4 PATIENT CONTROLLED ANALGESIA SYRINGE INTRAVENOUS
Status: DISCONTINUED | OUTPATIENT
Start: 2023-10-07 | End: 2023-10-14 | Stop reason: HOSPADM

## 2023-10-07 RX ORDER — HYDROMORPHONE HCL IN WATER/PF 6 MG/30 ML
0.2 PATIENT CONTROLLED ANALGESIA SYRINGE INTRAVENOUS
Status: DISCONTINUED | OUTPATIENT
Start: 2023-10-07 | End: 2023-10-14 | Stop reason: HOSPADM

## 2023-10-07 RX ORDER — SODIUM CHLORIDE, SODIUM LACTATE, POTASSIUM CHLORIDE, CALCIUM CHLORIDE 600; 310; 30; 20 MG/100ML; MG/100ML; MG/100ML; MG/100ML
INJECTION, SOLUTION INTRAVENOUS CONTINUOUS
Status: DISCONTINUED | OUTPATIENT
Start: 2023-10-07 | End: 2023-10-10

## 2023-10-07 RX ORDER — HEPARIN SODIUM 5000 [USP'U]/.5ML
5000 INJECTION, SOLUTION INTRAVENOUS; SUBCUTANEOUS EVERY 8 HOURS
Status: DISCONTINUED | OUTPATIENT
Start: 2023-10-07 | End: 2023-10-07

## 2023-10-07 RX ORDER — ACETAMINOPHEN 325 MG/1
975 TABLET ORAL EVERY 8 HOURS
Status: COMPLETED | OUTPATIENT
Start: 2023-10-07 | End: 2023-10-09

## 2023-10-07 RX ORDER — OXYCODONE HYDROCHLORIDE 5 MG/1
10 TABLET ORAL EVERY 4 HOURS PRN
Status: DISCONTINUED | OUTPATIENT
Start: 2023-10-07 | End: 2023-10-07

## 2023-10-07 RX ORDER — ONDANSETRON 2 MG/ML
4 INJECTION INTRAMUSCULAR; INTRAVENOUS EVERY 6 HOURS PRN
Status: DISCONTINUED | OUTPATIENT
Start: 2023-10-07 | End: 2023-10-07

## 2023-10-07 RX ORDER — BISACODYL 10 MG
10 SUPPOSITORY, RECTAL RECTAL DAILY PRN
Status: DISCONTINUED | OUTPATIENT
Start: 2023-10-07 | End: 2023-10-14 | Stop reason: HOSPADM

## 2023-10-07 RX ORDER — OXYCODONE HYDROCHLORIDE 5 MG/1
5 TABLET ORAL EVERY 4 HOURS PRN
Status: DISCONTINUED | OUTPATIENT
Start: 2023-10-07 | End: 2023-10-07

## 2023-10-07 RX ORDER — PROPOFOL 10 MG/ML
INJECTION, EMULSION INTRAVENOUS PRN
Status: DISCONTINUED | OUTPATIENT
Start: 2023-10-07 | End: 2023-10-07

## 2023-10-07 RX ORDER — HEPARIN SODIUM 10000 [USP'U]/100ML
500 INJECTION, SOLUTION INTRAVENOUS CONTINUOUS
Status: DISCONTINUED | OUTPATIENT
Start: 2023-10-07 | End: 2023-10-07

## 2023-10-07 RX ORDER — ACETAMINOPHEN 325 MG/1
650 TABLET ORAL EVERY 4 HOURS PRN
Status: DISCONTINUED | OUTPATIENT
Start: 2023-10-10 | End: 2023-10-07

## 2023-10-07 RX ORDER — POLYETHYLENE GLYCOL 3350 17 G/17G
17 POWDER, FOR SOLUTION ORAL DAILY
Status: DISCONTINUED | OUTPATIENT
Start: 2023-10-08 | End: 2023-10-14 | Stop reason: HOSPADM

## 2023-10-07 RX ORDER — ONDANSETRON 4 MG/1
4 TABLET, ORALLY DISINTEGRATING ORAL EVERY 6 HOURS PRN
Status: DISCONTINUED | OUTPATIENT
Start: 2023-10-07 | End: 2023-10-07

## 2023-10-07 RX ORDER — LISINOPRIL 10 MG/1
20 TABLET ORAL DAILY
Status: DISCONTINUED | OUTPATIENT
Start: 2023-10-08 | End: 2023-10-12

## 2023-10-07 RX ORDER — CLINDAMYCIN PHOSPHATE 900 MG/50ML
INJECTION, SOLUTION INTRAVENOUS PRN
Status: DISCONTINUED | OUTPATIENT
Start: 2023-10-06 | End: 2023-10-07

## 2023-10-07 RX ORDER — LIDOCAINE HYDROCHLORIDE 20 MG/ML
INJECTION, SOLUTION INFILTRATION; PERINEURAL PRN
Status: DISCONTINUED | OUTPATIENT
Start: 2023-10-06 | End: 2023-10-07

## 2023-10-07 RX ADMIN — EMPAGLIFLOZIN 10 MG: 10 TABLET, FILM COATED ORAL at 09:01

## 2023-10-07 RX ADMIN — GABAPENTIN 100 MG: 100 CAPSULE ORAL at 14:04

## 2023-10-07 RX ADMIN — PHENYLEPHRINE HYDROCHLORIDE 200 MCG: 10 INJECTION INTRAVENOUS at 02:34

## 2023-10-07 RX ADMIN — HEPARIN SODIUM 5000 UNITS: 1000 INJECTION, SOLUTION INTRAVENOUS; SUBCUTANEOUS at 00:29

## 2023-10-07 RX ADMIN — NICOTINE 1 PATCH: 14 PATCH, EXTENDED RELEASE TRANSDERMAL at 14:04

## 2023-10-07 RX ADMIN — HYDROMORPHONE HYDROCHLORIDE 0.2 MG: 0.2 INJECTION, SOLUTION INTRAMUSCULAR; INTRAVENOUS; SUBCUTANEOUS at 05:49

## 2023-10-07 RX ADMIN — Medication 5 MG: at 00:30

## 2023-10-07 RX ADMIN — ASPIRIN 81 MG 81 MG: 81 TABLET ORAL at 09:01

## 2023-10-07 RX ADMIN — ONDANSETRON 4 MG: 2 INJECTION INTRAMUSCULAR; INTRAVENOUS at 04:44

## 2023-10-07 RX ADMIN — TRIAMCINOLONE ACETONIDE: 0.25 CREAM TOPICAL at 18:58

## 2023-10-07 RX ADMIN — SODIUM CHLORIDE, POTASSIUM CHLORIDE, SODIUM LACTATE AND CALCIUM CHLORIDE: 600; 310; 30; 20 INJECTION, SOLUTION INTRAVENOUS at 13:08

## 2023-10-07 RX ADMIN — OXYCODONE HYDROCHLORIDE 10 MG: 5 TABLET ORAL at 16:12

## 2023-10-07 RX ADMIN — SODIUM CHLORIDE, POTASSIUM CHLORIDE, SODIUM LACTATE AND CALCIUM CHLORIDE: 600; 310; 30; 20 INJECTION, SOLUTION INTRAVENOUS at 04:45

## 2023-10-07 RX ADMIN — SENNOSIDES AND DOCUSATE SODIUM 1 TABLET: 50; 8.6 TABLET ORAL at 09:01

## 2023-10-07 RX ADMIN — PHENYLEPHRINE HYDROCHLORIDE 100 MCG: 10 INJECTION INTRAVENOUS at 00:06

## 2023-10-07 RX ADMIN — ARGATROBAN 0.5 MCG/KG/MIN: 50 INJECTION, SOLUTION INTRAVENOUS at 20:28

## 2023-10-07 RX ADMIN — PHENYLEPHRINE HYDROCHLORIDE 0.5 MCG/KG/MIN: 10 INJECTION INTRAVENOUS at 00:08

## 2023-10-07 RX ADMIN — ACETAMINOPHEN 975 MG: 325 TABLET, FILM COATED ORAL at 13:00

## 2023-10-07 RX ADMIN — PROPOFOL 30 MG: 10 INJECTION, EMULSION INTRAVENOUS at 00:02

## 2023-10-07 RX ADMIN — PHENYLEPHRINE HYDROCHLORIDE 100 MCG: 10 INJECTION INTRAVENOUS at 00:00

## 2023-10-07 RX ADMIN — DOCUSATE SODIUM 100 MG: 100 CAPSULE, LIQUID FILLED ORAL at 09:01

## 2023-10-07 RX ADMIN — Medication 5 MG: at 01:44

## 2023-10-07 RX ADMIN — GABAPENTIN 100 MG: 100 CAPSULE ORAL at 09:01

## 2023-10-07 RX ADMIN — PHENYLEPHRINE HYDROCHLORIDE 100 MCG: 10 INJECTION INTRAVENOUS at 02:51

## 2023-10-07 RX ADMIN — Medication 1 TABLET: at 18:30

## 2023-10-07 RX ADMIN — DOCUSATE SODIUM 100 MG: 100 CAPSULE, LIQUID FILLED ORAL at 20:42

## 2023-10-07 RX ADMIN — OMEPRAZOLE 20 MG: 20 CAPSULE, DELAYED RELEASE ORAL at 09:01

## 2023-10-07 RX ADMIN — TRIAMCINOLONE ACETONIDE: 0.25 CREAM TOPICAL at 09:01

## 2023-10-07 RX ADMIN — HEPARIN SODIUM 650 UNITS/HR: 10000 INJECTION, SOLUTION INTRAVENOUS at 04:42

## 2023-10-07 RX ADMIN — SENNOSIDES AND DOCUSATE SODIUM 1 TABLET: 50; 8.6 TABLET ORAL at 20:42

## 2023-10-07 RX ADMIN — ACETAMINOPHEN 975 MG: 325 TABLET, FILM COATED ORAL at 06:59

## 2023-10-07 RX ADMIN — SODIUM CHLORIDE, POTASSIUM CHLORIDE, SODIUM LACTATE AND CALCIUM CHLORIDE 500 ML: 600; 310; 30; 20 INJECTION, SOLUTION INTRAVENOUS at 16:29

## 2023-10-07 RX ADMIN — Medication 5 MG: at 00:56

## 2023-10-07 RX ADMIN — GABAPENTIN 100 MG: 100 CAPSULE ORAL at 22:17

## 2023-10-07 RX ADMIN — ALBUMIN HUMAN: 0.05 INJECTION, SOLUTION INTRAVENOUS at 00:22

## 2023-10-07 RX ADMIN — Medication 1 TABLET: at 09:01

## 2023-10-07 RX ADMIN — PHENYLEPHRINE HYDROCHLORIDE 100 MCG: 10 INJECTION INTRAVENOUS at 00:20

## 2023-10-07 RX ADMIN — PHENYLEPHRINE HYDROCHLORIDE 100 MCG: 10 INJECTION INTRAVENOUS at 00:10

## 2023-10-07 RX ADMIN — OXYCODONE HYDROCHLORIDE 10 MG: 5 TABLET ORAL at 20:46

## 2023-10-07 RX ADMIN — Medication 5 MG: at 01:26

## 2023-10-07 RX ADMIN — PHENYLEPHRINE HYDROCHLORIDE 100 MCG: 10 INJECTION INTRAVENOUS at 00:12

## 2023-10-07 RX ADMIN — ROSUVASTATIN CALCIUM 40 MG: 20 TABLET, FILM COATED ORAL at 22:17

## 2023-10-07 RX ADMIN — FLUTICASONE FUROATE AND VILANTEROL TRIFENATATE 1 PUFF: 200; 25 POWDER RESPIRATORY (INHALATION) at 09:01

## 2023-10-07 RX ADMIN — Medication 5 MG: at 00:33

## 2023-10-07 RX ADMIN — PHENYLEPHRINE HYDROCHLORIDE 100 MCG: 10 INJECTION INTRAVENOUS at 02:10

## 2023-10-07 RX ADMIN — SODIUM CHLORIDE, POTASSIUM CHLORIDE, SODIUM LACTATE AND CALCIUM CHLORIDE: 600; 310; 30; 20 INJECTION, SOLUTION INTRAVENOUS at 20:47

## 2023-10-07 RX ADMIN — ACETAMINOPHEN 975 MG: 325 TABLET, FILM COATED ORAL at 22:17

## 2023-10-07 RX ADMIN — PHENYLEPHRINE HYDROCHLORIDE 200 MCG: 10 INJECTION INTRAVENOUS at 00:26

## 2023-10-07 ASSESSMENT — LIFESTYLE VARIABLES: TOBACCO_USE: 1

## 2023-10-07 ASSESSMENT — ACTIVITIES OF DAILY LIVING (ADL)
ADLS_ACUITY_SCORE: 32
ADLS_ACUITY_SCORE: 28
ADLS_ACUITY_SCORE: 32
ADLS_ACUITY_SCORE: 28
ADLS_ACUITY_SCORE: 32
ADLS_ACUITY_SCORE: 28
ADLS_ACUITY_SCORE: 28

## 2023-10-07 ASSESSMENT — COPD QUESTIONNAIRES: COPD: 1

## 2023-10-07 NOTE — ANESTHESIA POSTPROCEDURE EVALUATION
Patient: Shirley Hendricks    Procedure: Procedure(s):  Right anterior tibial bypass with graft, Right tibial endarterectomy,thrombectomy, Right doraslis pedis thrombectomy, Anterior Tibial vein patch.       Anesthesia Type:  General    Note:  Disposition: Inpatient   Postop Pain Control: Uneventful            Sign Out: Well controlled pain   PONV: No   Neuro/Psych: Uneventful            Sign Out: Acceptable/Baseline neuro status   Airway/Respiratory: Uneventful            Sign Out: Acceptable/Baseline resp. status   CV/Hemodynamics: Uneventful            Sign Out: Acceptable CV status; No obvious hypovolemia; No obvious fluid overload   Other NRE: NONE   DID A NON-ROUTINE EVENT OCCUR?            Last vitals:  Vitals:    10/07/23 0530 10/07/23 0545 10/07/23 0600   BP: 114/61 112/67 97/57   Pulse: 66 64 67   Resp: (!) 6 (!) 8 11   Temp:      SpO2: 98% 99% 98%       Electronically Signed By: Jenae Braga  October 7, 2023  7:03 AM

## 2023-10-07 NOTE — PROGRESS NOTES
VASCULAR SURGERY: Postop check    Back in ICU.  She is somewhat confused and remembers certain things last evening primarily that her foot was quite sore when the grafts occluded.  Reports that her foot feels much better.    Vital signs are stable.  Extubated and breathing easily.  All dressings dry and right leg.  Foot is warm and pink.  Biphasic bedside Doppler to the distal AT    Hgb= 10.0    IMPRESSION: Successful emergent revascularization leg with bypass to anterior tibial.  Thrombectomy to anterior tibial and dorsalis pedis artery.  With his PTFE graft we will start initially on low-dose heparin 500 units an hour along with    Strict I&O is necessary with her progress surgery and blood loss and thus will leave Alvarez catheter in today.  We will evaluate later today whether can transfer out of ICU.       Chadwick Lozano MD

## 2023-10-07 NOTE — ANESTHESIA PREPROCEDURE EVALUATION
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 clot    Post-menopausal     Reflux esophagitis 2004    EGD: esophagitis and medium HH    SBO (small bowel obstruction) (H) 08/10/2021    SVT (supraventricular tachycardia)     Thrombocytopenia (H24)     Uncomplicated asthma     Vitamin C deficiency 2018      Past Surgical History:   Procedure Laterality Date    ANGIOGRAM      ANGIOGRAM Right 2021    Procedure: RIGHT LOWER EXTREMITY ANGIOGRAM WITH LEFT BRACHIAL ARTERY CUTDOWN;  Surgeon: José Luis Hernandez MD;  Location:  OR    BYPASS GRAFT FEMOROPOPLITEAL Right 2020    Procedure: RIGHT FEMORAL GRAFT TO ABOVE-KNEE POPLITEAL BYPASS USING CADAVERIC SUPERFICIAL FEMORAL ARTERY;  Surgeon: Chadwick Lozano MD;  Location:  OR    BYPASS GRAFT FEMOROPOPLITEAL Right 2/15/2022    Procedure: RIGHT SUPERFICIAL FEMORAL ARTERY GRAFT TO BELOW KNEE POPLITEAL BYPASS  WITH CADAVERIC CRYOLIFE  FEMORAL-POPLITEAL ARTERY SIZE: OUTER DIAMETER: 7MM - 6MM;  Surgeon: Chadwick Lozano;  Location:  OR    BYPASS GRAFT FEMOROPOPLITEAL Right 5/26/2023    Procedure: RIGHT DISTAL SUPERFICIAL FEMORAL GRAFT TO ANTERIOR TIBIAL ARTERY WITH 26 CENTIMETER CADAVERIC SUPERFICIAL FEMORAL ARTERY GRAFT;  Surgeon: Chadwick Lozano MD;  Location:  OR    BYPASS GRAFT INSITU FEMOROPOPLITEAL Right 7/7/2021    Procedure: CREATION RIGHT FEMORAL TO POPLITEAL ARTERIAL BYPASS USING INSITU VEIN GRAFT;  Surgeon: José Luis Hernandez MD;  Location:  OR    CARDIAC CATHERIZATION  09/03/2009    multivessel CAD without target lesions, med mgmt indicated, preserved ef    CARPAL TUNNEL RELEASE RT/LT Right 05/20/2021    CV CORONARY ANGIOGRAM N/A 10/4/2023    Procedure: Coronary Angiogram;  Surgeon: Rogelio Ricks MD;  Location:  HEART CARDIAC CATH LAB    CV PCI N/A 10/4/2023    Procedure: Percutaneous Coronary Intervention;  Surgeon: Rogelio Ricks MD;  Location:  HEART CARDIAC CATH LAB    ENDARTERECTOMY CAROTID Right 05/11/2016    Procedure: ENDARTERECTOMY CAROTID;  Surgeon: Chadwick Lozano MD;  Location:  OR    ENDARTERECTOMY CAROTID Left 06/08/2020    Procedure: LEFT CAROTID ENDARTERECTOMY with distal facal vein patch  and EEG;  Surgeon: Chadwick Lozano MD;  Location:  OR    FINE NEEDLE ASPIRATION WITHOUT IMAGING GUIDANCE Right 9/22/2023    Procedure: Fine needle aspiration without imaging guidance;  Surgeon: Kiersten Aguliera MD;  Location:  GI    FLUORESCENCE INTRAOPERATIVE VASCULAR ANGIOGRAPHY Right 12/28/2022    Procedure: RIGHT LEG ANGIOGRAM with intervention;  Surgeon: Chadwick Lozano MD;  Location:  OR    GYN SURGERY  left tube    left salpingectomy    IR LOWER EXTREMITY ANGIOGRAM RIGHT  05/25/2021    IR LOWER EXTREMITY ANGIOGRAM RIGHT  10/5/2023    IR OR ANGIOGRAM  6/23/2021    IR OR ANGIOGRAM  12/28/2022    LAPAROSCOPIC CHOLECYSTECTOMY  N/A 09/27/2017    Procedure: LAPAROSCOPIC CHOLECYSTECTOMY;  LAPAROSCOPIC CHOLECYSTECTOMY;  Surgeon: Jacoby Tapia MD;  Location: [facility]    LAPAROSCOPY DIAGNOSTIC (GENERAL) N/A 8/11/2021    Procedure: Exploratory laparoscopy,  laparoscopic lysis of adhesions, laparotomy;  Surgeon: Corina Ferreira MD;  Location:  OR    ORTHOPEDIC SURGERY      left knee surgery    REPAIR ANEURYSM FEMORAL ARTERY Left 12/28/2022    Procedure: REPAIR LEFT FEMORAL PSEUDOANEURYSM;  Surgeon: Chadwick Lozano MD;  Location:  OR    VASCULAR SURGERY  aoto bi fem  left fem-AK pop    [facility] FABRIC WRAPPING OF ABDOMINAL ANEURYSM      [facility] NONSPECIFIC PROCEDURE  12/2000    angioplasty with stent right fem. a.    [facility] NONSPECIFIC PROCEDURE  1987    sinus surgery    [facility] NONSPECIFIC PROCEDURE  09/02/2009    Emergent left groin exploration with oversewing of bleeding angiographic site. 2. Endarterectomy of common femoral-proximal superficial femoral artery with greater saphenous vein patch angioplasty.   a. Stuart of accessory right greater saphenous vein.     [facility] NONSPECIFIC PROCEDURE  08/27/2009    occluded left common iliac and external iliac arteries were successfully revascularized with stenting to 8 and 7 mm       Allergies   Allergen Reactions    Contrast Dye Anaphylaxis     RASH, FACIAL AND NECK SWELLING, SOB, WHEEZING    Pantoprazole      Protonix caused diffuse edema    Chantix [Varenicline]      Terrible dreams    Penicillins Itching      Social History     Tobacco Use    Smoking status: Every Day     Packs/day: 0.50     Years: 52.00     Pack years: 26.00     Types: Cigarettes     Start date: 1968    Smokeless tobacco: Never    Tobacco comments:     1/2 PPD   Substance Use Topics    Alcohol use: Not Currently     Comment: x1-2 yr      Wt Readings from Last 1 Encounters:   10/06/23 54.3 kg (119 lb 12.8 oz)        Anesthesia Evaluation   Pt has had prior anesthetic. Type: General.    No history of anesthetic complications        ROS/MED HX  ENT/Pulmonary:     (+)           allergic rhinitis,     tobacco use, Current use,    asthma    COPD,           (-) sleep apnea   Neurologic:       Cardiovascular: Comment: Recent NSTEMI    [place] cadriomyopathy: Ef 30-35%      Multiple femoral bypass grafts  Hx of bilateral carotid endarterectomies  Hx of SVT    Name: KASIA WAN  MRN: [id number]  : 1960  Study Date: 2021 12:57 PM  Age: 60 yrs  Gender: Female  Patient Location: [facility]  Reason For Study: Light headedness  Ordering Physician: JACKIE DOWNEY  Referring Physician: Vasquez Benoit  Performed By: Corey James RDCS     BSA: 1.5 m2  Height: 62 in  Weight: 112 lb  HR: 57  BP: 140/78 mmHg  ______________________________________________________________________________  Procedure  Stress Echo Complete.     ______________________________________________________________________________  Interpretation Summary     Suboptimal stress test due to inadequate maximum heart rate.  Normal left ventricular function and wall motion at rest and post-stress but  LV size and function were unchanged following limited exercise of 3 minutes.  Post-exercise images were obtained with the heart rate in the 70's bpm.  Blood pressure seth appropriately from 140/78 mmHg (supine, baseline) to  158/80 mmHg during Stage I and fell to 134/74 mmHg in recovery. Consider  Lexiscan stress imaging (nuclear, MRI) for adequate testing.  ______________________________________________________________________________  Stress  Exercise was stopped due to fatigue.  There was a normal BP response to exercise.  Target Heart Rate was not achieved due to fatigue.  Suboptimal stress test due to inadequate maximum heart rate.  J point/ST elevation at rest pseudonormalized with exercise.  There was no arrhythmia.  The Duke treadmill score was intermediate risk ( -11< Sterling score <5).  Normal left ventricular function and wall motion at rest and  post-stress.  The visual ejection fraction is estimated at 60-65%.     Baseline  The patient is in normal sinus rhythm.  Baseline ECG displays Q waves in the mike-septal leads.  Elevated J point V3-V6, inferior leads - most C/W early repolarization.  Normal left ventricular function and wall motion at rest and post-stress.  The visual ejection fraction is estimated at 60-65%.     Stress Results         Protocol:  Eliezre Protocol        Maximum Predicted HR:   158 bpm         Target HR: 134 bpm               % Maximum Predicted HR: 66 %        Stage  DurationHeart Rate  BP                    Comment             (mm:ss)   (bpm)    Stage 1   3:00      104   158/80Patient requested to stop   RecoveryR  4:00      56    134/74RPP = 91813, Sterling = 2, FAC = Below average             Stress Duration:   3:00 mm:ss        Recovery Time: 4:00 mm:ss          Maximum Stress HR: 104 bpm           METS:          4     Mitral Valve  There is no mitral regurgitation noted.     Tricuspid Valve  No tricuspid regurgitation.     Aortic Valve  No aortic regurgitation is present. No hemodynamically significant valvular  aortic stenosis.  ______________________________________________________________________________  MMode/2D Measurements & Calculations  IVSd: 1.00 cm     LVIDd: 4.0 cm  LVIDs: 2.8 cm  LVPWd: 0.73 cm  FS: 31.8 %  LV mass(C)dI: 69.9 grams/m2  asc Aorta Diam: 2.6 cm  RWT: 0.36    (+) Dyslipidemia hypertension- -  CAD - past MI - -                                 Previous cardiac testing   Echo: Date: Results:    Stress Test:  Date:  Results:  Name: KASIA WAN  MRN: [id number]  : 1960  Study Date: 2021 12:57 PM  Age: 60 yrs  Gender: Female  Patient Location: [facility]  Reason For Study: Light headedness  Ordering Physician: JACKIE DOWNEY  Referring Physician: Vasquez Benoit  Performed By: Corey James RDCS     BSA: 1.5 m2  Height: 62 in  Weight: 112 lb  HR: 57  BP: 140/78  mmHg  ______________________________________________________________________________  Procedure  Stress Echo Complete.     ______________________________________________________________________________  Interpretation Summary     Suboptimal stress test due to inadequate maximum heart rate.  Normal left ventricular function and wall motion at rest and post-stress but  LV size and function were unchanged following limited exercise of 3 minutes.  Post-exercise images were obtained with the heart rate in the 70's bpm.  Blood pressure seth appropriately from 140/78 mmHg (supine, baseline) to  158/80 mmHg during Stage I and fell to 134/74 mmHg in recovery. Consider  Lexiscan stress imaging (nuclear, MRI) for adequate testing.  ______________________________________________________________________________  Stress  Exercise was stopped due to fatigue.  There was a normal BP response to exercise.  Target Heart Rate was not achieved due to fatigue.  Suboptimal stress test due to inadequate maximum heart rate.  J point/ST elevation at rest pseudonormalized with exercise.  There was no arrhythmia.  The Duke treadmill score was intermediate risk ( -11< Sterling score <5).  Normal left ventricular function and wall motion at rest and post-stress.  The visual ejection fraction is estimated at 60-65%.     Baseline  The patient is in normal sinus rhythm.  Baseline ECG displays Q waves in the mike-septal leads.  Elevated J point V3-V6, inferior leads - most C/W early repolarization.  Normal left ventricular function and wall motion at rest and post-stress.  The visual ejection fraction is estimated at 60-65%.     Stress Results         Protocol:  Eliezer Protocol        Maximum Predicted HR:   158 bpm         Target HR: 134 bpm               % Maximum Predicted HR: 66 %        Stage  DurationHeart Rate  BP                    Comment             (mm:ss)   (bpm)    Stage 1   3:00      104   158/80Patient requested to stop   RecoveryR  4:00       56    134/74RPP = 83352, Sterling = 2, FAC = Below average             Stress Duration:   3:00 mm:ss        Recovery Time: 4:00 mm:ss          Maximum Stress HR: 104 bpm           METS:          4     Mitral Valve  There is no mitral regurgitation noted.     Tricuspid Valve  No tricuspid regurgitation.     Aortic Valve  No aortic regurgitation is present. No hemodynamically significant valvular  aortic stenosis.  ______________________________________________________________________________  MMode/2D Measurements & Calculations  IVSd: 1.00 cm     LVIDd: 4.0 cm  LVIDs: 2.8 cm  LVPWd: 0.73 cm  ECG Reviewed:  Date: Results:    Cath:  Date: Results:      METS/Exercise Tolerance: 3 - Able to walk 1-2 blocks without stopping    Hematologic: Comments: Lupus      Musculoskeletal: Comment: Charcot Breonna Tooth      GI/Hepatic:     (+) GERD,                   Renal/Genitourinary:       Endo:       Psychiatric/Substance Use:     (+) psychiatric history anxiety       Infectious Disease:       Malignancy:       Other:            Physical Exam    Airway        Mallampati: III   TM distance: > 3 FB   Neck ROM: full   Mouth opening: > 3 cm    Respiratory Devices and Support         Dental       (+) loose    B=Bridge, C=Chipped, L=Loose, M=Missing    Cardiovascular          Rhythm and rate: regular and normal     Pulmonary           (+) decreased breath sounds           OUTSIDE LABS:  CBC:   Lab Results   Component Value Date    WBC 7.7 10/07/2023    WBC 6.5 10/06/2023    HGB 10.0 (L) 10/07/2023    HGB 10.0 (L) 10/06/2023    HCT 30.0 (L) 10/07/2023    HCT 32.9 (L) 10/06/2023     (L) 10/07/2023     10/06/2023     BMP:   Lab Results   Component Value Date     10/07/2023     10/06/2023    POTASSIUM 4.6 10/07/2023    POTASSIUM 4.8 10/06/2023    CHLORIDE 106 10/07/2023    CHLORIDE 107 10/06/2023    CO2 15 (L) 10/07/2023    CO2 18 (L) 10/06/2023    BUN 21.2 10/07/2023    BUN 18.9 10/06/2023    CR 0.77 10/07/2023     CR 0.72 10/06/2023    GLC 80 10/07/2023     (H) 10/06/2023     COAGS:   Lab Results   Component Value Date    PTT 40 (H) 10/07/2023    INR 1.38 (H) 10/07/2023    FIBR 118 (L) 10/07/2023     POC:   Lab Results   Component Value Date    BGM 87 07/08/2021     HEPATIC:   Lab Results   Component Value Date    ALBUMIN 4.4 10/03/2023    PROTTOTAL 7.7 10/03/2023    ALT 22 10/03/2023    AST 25 10/03/2023    ALKPHOS 82 10/03/2023    BILITOTAL 0.9 10/03/2023     OTHER:   Lab Results   Component Value Date    PH 7.27 (L) 10/04/2023    LACT <0.3 10/04/2023    A1C 5.3 02/15/2022    SONIA 7.5 (L) 10/07/2023    PHOS 4.0 07/09/2021    MAG 1.9 10/06/2023    LIPASE 132 08/10/2021    AMYLASE 46 08/14/2017    TSH 0.77 01/05/2021    T4 1.19 03/28/2017    CRP <2.9 09/18/2014    SED 19 12/09/2022       Anesthesia Plan    ASA Status:  3    NPO Status:  NPO Appropriate    Anesthesia Type: General.     - Airway: ETT   Induction: Intravenous.   Maintenance: Balanced.   Techniques and Equipment:     - Lines/Monitors: Arterial Line     Consents    Anesthesia Plan(s) and associated risks, benefits, and realistic alternatives discussed. Questions answered and patient/representative(s) expressed understanding.     - Discussed:     - Discussed with:  Patient      - Extended Intubation/Ventilatory Support Discussed: No.      - Patient is DNR/DNI Status: No     Use of blood products discussed: No .     Postoperative Care    Pain management: Multi-modal analgesia.   PONV prophylaxis: Ondansetron (or other 5HT-3), Dexamethasone or Solumedrol     Comments:                Jenae Braga

## 2023-10-07 NOTE — PROGRESS NOTES
[place] [facility]    Right groin bleeding stopped but now entire right graft clotted with ischemic leg.    To OR.  Likely right femoral to AT PTFE bypass.  Discussed with patient.      Chadwick Lozano MD

## 2023-10-07 NOTE — PLAN OF CARE
Neuro: Alert and oriented. Baseline neuropathy.     Cardiac: SR/SB. Occasionally hypotensive. RLE pulses palpable/dopplerable. Pulses checked q1 hour.    Respiratory: Lung sounds clear, room air.     GI/: Advanced to regular diet. Leger catheter in place, slow output this morning but picked up this PM.     Mobility: Not out of bed this shift. Bedrest order discontinued per MD, but patient not wanting to get out of bed with leger catheter in. Patient shifting weight and turning independently in bed.     Skin: 3x incisions to RLE, dressings with some shadowing but otherwise CDI.     Shift Events: 500cc LR bolus given for hypotension and low UOP.     Plan of Care: Leger catheter likely to be removed tomorrow. Stay in ICU throughout the night for close monitoring. Continue monitoring pulses q1 hour.

## 2023-10-07 NOTE — PROGRESS NOTES
10/06/23 ~ 10/07/23   1900--0730:   - 2030: R) Groin site bleeding significantly, Manual Pressure applied- IR MD notified, verbal to continue to hold pressure, recheck Hgb. Dr. Lozano notified per Dr. Rosenthal.    - 2215: RLE Distal Pulses Lost, Doppler absent of pulse, R) Foot cold, pale turning dusky. Dr. Lozano paged & notified, provider [facility] the way [facility].  - Dr. Lozano present at bedside, plan for emergent vascular surgery in OR.  - 2330: Patient taken to OR.    - 0445: Patient return to ICU, Heparin gtt Started @ 500 unit(s)/hr per Vasc surg MD. RLE Distal pulses noted w/ Doppler.  - 0515: Hgb 10.0      Neuro  - LOC: A/Ox4, follows commands. Speech: clear. Pupils: 3mm, equal, round, reactive.  - LUE 5/5.  -RUE 5/5.  -LLE 5/5.  -RLE 2/5, sensation restored.    Cardiac  - SR, HR 60-80s, SBP 100s-170s, MAP >65.   - Pulses: RLE Doppler.    Resp   - 2L NC, SpO2 >90%. LS: Clear. Reg/Unlabored.    GI  - Clear Liquids, tolerating liquids w/ PO meds. Abdomen: hypoactive, soft, nontender.      - Alvarez catheter in place. UOP adequate    Glucose   - WDL    Skin  - RLE incisions x3, dressings- WDL, Gauze/Kerlix.    Pain  - c/o Back pain, Dilaudid 0.2mg IV given.    Gtts/Infusions  - Heparin 500 unit(s)/hr

## 2023-10-07 NOTE — PROGRESS NOTES
[facility] Progress Note          Assessment and Plan:   Ms. Shirley Hendricks is a 62 year old woman who was admitted on 10/3 with chest heaviness and subsequent diagnosis of NSTEMI    B cell lymphoma ( per note from Dr. Dreyer)  - Increasing inferior auricular lymph node vs mass causing some mild discomfort  - Seen by Dr. Mendoza -> needle biopsy 9/22/23 revealed atypical lymphoid population, immunophenotyping is compatible with malignant B-cell lymphoma.   - Lambda monocytic B cells negative for CD5/10. Immunophenotype can be seen in Marginal zone lymphoma, LPL, Hairy cell leukemia among other less common possibilities  - She has no signs or symptoms of an aggressive B cell lymphoma at present (No B symptoms)      Anemia  Hemoglobin 10.o today     Peripheral arterial disease with extensive prior treatment  - Occlusion of RLE fem-pop bypass s/p catheter thrombolysis and thrombectomy   - Shee developed bleeding from his access site and he underwent emergent right femoral and anterior tibial thrombectomy, anterior tibial endarterectomy with vein patch angioplasty, femoral to anterior tibial Propaten 6 mm thin waled bypass and dorsalis thrombectomy     NSTEMI     PLAN  - Await results of further work up for lymphoma  - discussed treatment options with patient and recommended against starting treatment at this point given recent emergent vascular surgery             Interval History:     Patient reports fatigue but no other new symptoms              Medications:      acetaminophen  975 mg Oral Q8H    amLODIPine  5 mg Oral BID    aspirin  81 mg Oral Daily    calcium carbonate-vitamin D  1 tablet Oral BID w/meals    carvedilol  6.25 mg Oral BID w/meals    docusate sodium  100 mg Oral BID    empagliflozin  10 mg Oral Daily    fluticasone-vilanterol  1 puff Inhalation Daily    gabapentin  100 mg Oral TID    lisinopril  20 mg Oral BID    nicotine  1 patch Transdermal Daily    nicotine   Transdermal Q8H     "nicotine   Transdermal Once    omeprazole  20 mg Oral Daily    [START ON 10/8/2023] polyethylene glycol  17 g Oral Daily    rosuvastatin  40 mg Oral At Bedtime    senna-docusate  1 tablet Oral BID    sodium chloride (PF)  3 mL Intracatheter Q8H    triamcinolone   Topical BID     [DISCONTINUED] acetaminophen **OR** acetaminophen, acetaminophen, bisacodyl, Continuing ACE inhibitor/ARB/ARNI from home medication list OR ACE inhibitor/ARB order already placed during this visit, - MEDICATION INSTRUCTIONS -, - MEDICATION INSTRUCTIONS -, glucose **OR** dextrose **OR** glucagon, HOLD MEDICATION, HYDROmorphone **OR** HYDROmorphone, lidocaine 4%, lidocaine (buffered or not buffered), magnesium hydroxide, - MEDICATION INSTRUCTIONS -, melatonin, metoclopramide **OR** metoclopramide, midazolam, naloxone **OR** naloxone **OR** naloxone **OR** naloxone, nitroGLYcerin, ondansetron **OR** ondansetron, oxyCODONE **OR** oxyCODONE, prochlorperazine **OR** prochlorperazine **OR** prochlorperazine, STATIN NOT PRESCRIBED, senna-docusate, sodium chloride (PF)               Physical Exam:   Blood pressure 132/65, pulse 65, temperature 97.5  F (36.4  C), temperature source Oral, resp. rate 18, height 1.6 m (5' 3\"), weight 53.6 kg (118 lb 2.7 oz), SpO2 100 %, not currently breastfeeding.  Wt Readings from Last 4 Encounters:   10/07/23 53.6 kg (118 lb 2.7 oz)   23 52.3 kg (115 lb 6.4 oz)   23 52.1 kg (114 lb 12.8 oz)   22 51.1 kg (112 lb 11.2 oz)         Vital Sign Ranges  Temperature Temp  Av.6  F (36.4  C)  Min: 97.2  F (36.2  C)  Max: 97.9  F (36.6  C)   Blood pressure Systolic (24hrs), Av , Min:92 , Max:198        Diastolic (24hrs), Av, Min:51, Max:111      Pulse Pulse  Av.3  Min: 49  Max: 81   Respirations Resp  Av.8  Min: 6  Max: 29   Pulse oximetry SpO2  Av.7 %  Min: 90 %  Max: 100 %         Intake/Output Summary (Last 24 hours) at 10/7/2023 1053  Last data filed at 10/7/2023 0900  Gross per " 24 hour   Intake 3844.3 ml   Output 2340 ml   Net 1504.3 ml       Constitutional: Sleepy but arousable, cooperative, no apparent distress     Lungs: Clear to auscultation bilaterally, no crackles or wheezing   Cardiovascular: Regular rate and rhythm, normal S1 and S2   Abdomen: Soft, non-distended, non-tender   Skin: No rashes, no cyanosis, no edema. Bandages on right foot clean and ry   Other:           Data:   Laboratory:  CMP  Recent Labs   Lab 10/07/23  0921 10/07/23  0516 10/06/23  1333 10/06/23  0551 10/06/23  0023 10/05/23  0429 10/04/23  0619 10/03/23  0747 10/03/23  0737   NA  --  136  --  136  --  132* 132*  --  131*   POTASSIUM  --  4.6  --  4.8  --  4.3 5.0  --  4.4   CHLORIDE  --  106  --  107  --  101 98  --  95*   CO2  --  15*  --  18*  --  18* 20*  --  22   ANIONGAP  --  15  --  11  --  13 14  --  14   GLC 74 80 101* 78   < > 120* 132*  --  101*   BUN  --  21.2  --  18.9  --  21.9 17.0  --  17.5   CR  --  0.77  --  0.72  --  0.76 0.73   < > 0.77   GFRESTIMATED  --  86  --  >90  --  87 >90   < > 86   SONIA  --  7.5*  --  8.4*  --  8.7* 9.1  --  9.7   MAG  --  1.7  --  1.9  --  1.9 1.9   < >  --    PROTTOTAL  --   --   --   --   --   --   --   --  7.7   ALBUMIN  --   --   --   --   --   --   --   --  4.4   BILITOTAL  --   --   --   --   --   --   --   --  0.9   ALKPHOS  --   --   --   --   --   --   --   --  82   AST  --   --   --   --   --   --   --   --  25   ALT  --   --   --   --   --   --   --   --  22    < > = values in this interval not displayed.     CBC  Recent Labs   Lab 10/07/23  0516 10/06/23  2201 10/06/23  1928 10/06/23  0551 10/05/23  0429 10/04/23  0619   WBC 7.7  --  6.5 9.3  --  5.1   RBC 3.47*  --  3.72* 3.83  --  4.75   HGB 10.0* 10.0* 10.6* 10.8*   < > 13.4   HCT 30.0*  --  32.9* 33.3*  --  40.5   MCV 87  --  88 87  --  85   MCH 28.8  --  28.5 28.2  --  28.2   MCHC 33.3  --  32.2 32.4  --  33.1   RDW 15.0  --  15.0 14.9  --  14.4   *  --  176 177  --  215    < > = values in  this interval not displayed.     INR  Recent Labs   Lab 10/07/23  0516 10/06/23  0551 10/05/23  1014   INR 1.38* 1.08 0.95       Imaging data:  ADDENDUM:   Indication should read as follows:  62-year-old female with extensive vascular surgery history including aortobifemoral bypass, right femoral-popliteal bypass, and right lower extremity jump graft from the fem-pop bypass to anterior tibial artery. Patient admitted for concerns of NSTEMI and acute limb ischemia with absent distal right lower extremity pulses. US/Doppler confirmed complete occlusion of the right lower extremity jump graft, anterior tibial, and posterior tibial arteries. IR consulted for angiogram and possible intervention.   SHARMAINE CARLSON MD         SYSTEM ID:  [id number] Signed: 10/05/23 2030 by Sharmaine Carlson MD   Narrative:     [facility]  LOCATION: [facility]  DATE: 10/5/2023    PROCEDURE:  1. US-GUIDED VASCULAR ACCESS  2. RIGHT LOWER EXTREMITY ANGIOGRAPHY  3. RIGHT LOWER EXTREMITY ARTERIAL CATHTER DIRECTED THROMBOLYSIS  INITIATION  4. MODERATE SEDATION    INTERVENTIONAL RADIOLOGIST:  Sharmaine Carlson MD    INDICATION: 62-year-old female with stent.    CONSENT: The risks, benefits and alternatives of dialysis  arteriovenous shunt evaluation with possible angioplasty, stent  placement, thrombolysis and/or tunneled dialysis catheter placement  were discussed with the patient in detail. All questions were  answered. Informed consent was given to proceed with the procedure.    MODERATE SEDATION: Versed 2.5 mg IV; Fentanyl 125 mcg IV.  Under  physician supervision, Versed and fentanyl were administered for  moderate sedation. Pulse oximetry, heart rate and blood pressure were  continuously monitored by an independent trained observer. The  physician spent 60 minutes of face-to-face sedation time with the  patient.    CONTRAST: 45 mL Visipaque intravascular.  ANTIBIOTICS: None.  ADDITIONAL MEDICATIONS: None.    FLUOROSCOPIC  TIME: 10.0 minutes.  RADIATION DOSE: Air Kerma: 119 mGy.    COMPLICATIONS: No immediate complications.    STERILE BARRIER TECHNIQUE: Maximum sterile barrier technique was used.  Cutaneous antisepsis was performed at the operative site with  application of 2% chlorhexidine and large sterile drape. Prior to the  procedure, the  and assistant performed hand hygiene and wore  hat, mask, sterile gown, and sterile gloves during the entire  procedure.    PROCEDURE:  The patient was positioned supine on the fluoroscopic table and the  bilateral groins were prepped and draped in usual sterile fashion.    1% lidocaine was used for local anesthesia. Using ultrasound guidance,  the right common femoral artery was accessed just proximal to the  femoral-popliteal bypass proximal origin. Permanent ultrasound and  fluoroscopic images of access site were obtained. Access was upgraded  for a 6 [place] by 30 cm vascular sheath which was advanced over a wire  into the proximal femoral-popliteal bypass graft. Right lower  extremity angiography was performed in multiple stations through the  arterial sheath.    Following these results, an angled catheter and Glidewire were used to  cross into the occluded jump graft. Gentle contrast injection was  performed to confirm intraluminal position with spot fluoroscopic  image obtained. Presumed site of jump graft distal anastomosis was  briefly attempted to be crossed without success. Over a wire, a 100 cm  total length 30 cm infusion length infusion length Michael Tommy  catheter was positioned across the jump graft and guidewire was  removed. Spot fluoroscopic images obtained to document final catheter  and sheath positioning. The sheath was secured to the skin with a  pursestring suture. A sterile dressing was applied in used to cover  the external portions of the infusion catheter and sheath. TPA  infusion via the infusion catheter was then initiated at a rate of 0.5  mg/hr. A  heparin infusion was initiated through the vascular sheath at  a rate of 500 units/hr. The patient tolerated the procedure well and  left interventional radiology in stable condition.    FINDINGS:  1. Right femoral ultrasound demonstrates patency of the common femoral  artery and proximal femoral-popliteal bypass graft.  2. Right lower extremity angiography demonstrates widely patent  femoral-popliteal bypass graft extending from the distal common  femoral artery to the above-knee popliteal artery. Occlusion of the  presumed femoral-popliteal bypass graft distal anastomosis at the  popliteal artery, with associated prominent arterial collaterals.  Known jump graft extending from the distal femoral-popliteal bypass  graft to the anterior tibial artery is occluded, with arterial stump  is imminently representing the proximal anastomosis.  3. Following successful crossing into the jump graft, gentle contrast  injection demonstrates thrombus throughout.  4. Spot fluoroscopic images document infusion catheter extending from  the jump graft proximal anastomosis to just proximal to presumed site  of distal jump graft anastomosis. The vascular sheath terminates in  the mid-distal femoral-popliteal bypass graft.   Impression:     IMPRESSION:  Right lower extremity angiogram with catheter directed thrombolysis  initiation across the occluded jump graft extending from the distal  femoral-popliteal bypass to the anterior tibial artery.    PLAN:  The patient will be monitored in the ICU overnight. The patient will  have the infusion continue during this time period with close  monitoring for thrombolytic complications. The patient will return to  IR on subsequent day for additional angiogram to evaluate for lysis  progression and possible additional interventions.    SHARMAINE BAR MD        SYSTEM ID:  [id number]    Lower Extremity Arterial Duplex Right [[id number]] Resulted: 10/04/23 0911   Order Status: Completed Updated:  10/04/23 0912   Narrative:     US LOWER EXTREMITY ARTERIAL DUPLEX RIGHT  PROCEDURE DATE: 10/4/2023 8:45 AM    HISTORY:  Concern for occluded jump graft. Patient with a history of  bilateral aortofemoral bypass graft, right-sided SFA to below knee  popliteal artery bypass graft. Subsequent jump graft from the distal  femoropopliteal to the anterior tibial artery placed 5/26/2023.  Concern for occlusion of the jump graft.    COMPARISON: Lower extremity arterial ultrasound 8/10/2023    TECHNIQUE:  Duplex imaging is performed utilizing gray-scale,  two-dimensional images and color-flow imaging. Doppler waveform  analysis and spectral Doppler imaging is also performed.    FINDINGS:  Patency of the right femoropopliteal bypass with multiphasic  waveforms. The distal anastomosis demonstrates focal velocity  elevation and tortuosity. There is occlusion of the popliteal artery  distal to the femoropopliteal graft anastomosis. The jump graft from  the distal femoropopliteal bypass to the anterior tibial artery  appears completely thrombosed beginning just distal to the  anastomosis. The native popliteal artery and TP trunk appear  chronically occluded. The native ISIDRO and DP are completely occluded.   Impression:     IMPRESSION:  1.  Complete occlusion of the jump graft, ISIDRO, and DPA. The inflow  femoropopliteal bypass graft is patent.  2.  Focal velocity elevation of the distal femoropopliteal graft  anastomosis suggesting luminal stenosis, however this is distal to the  origin of the jump graft.  3.  Redemonstration of chronic occlusion of the outflow popliteal  artery and TP trunk distal to the femoropopliteal bypass graft.      Dr. Hernandez discussed the case in person with Dr. Lozano.    BERYL HERNANDEZ MD        SYSTEM ID:  [id number]   Echocardiogram Complete [[id number]] Collected: 10/03/23 1106   Order Status: Completed Updated: 10/03/23 1304    LVEF 30-35%   Narrative:      [id number]  [facility]  [id number]  [id number]^IRVIN^NICK     [facility]  Echocardiography Laboratory  [address] [place] [address]     Name: KASIA WAN  MRN: [id number]  : 1960  Study Date: 10/03/2023 11:06 AM  Age: 62 yrs  Gender: Female  Patient Location: [facility]  Reason For Study: Acute Myocardial Infarction  Ordering Physician: NICK BRYAN  Referring Physician: Vasquez Benoit  Performed By: Radha Mcfadden RDCS     BSA: 1.5 m2  Height: 63 in  Weight: 115 lb  HR: 80  BP: 108/96 mmHg  ______________________________________________________________________________  Procedure  Complete Portable Echo Adult. Optison (NDC #2726-5389) given intravenously.  Technically difficult study.  ______________________________________________________________________________  Interpretation Summary     Technically difficult study with some improvement after contrast use.     Normal LV size and wall thickness with severely reduced LV systolic function.  Visually estimated LVEF is around 30 to 35%.  Mid to distal portions of the left ventricle are completely akinetic to  severely hypokinetic. Basal segment contractility seems preserved. Findings  likely suggest stress-induced cardiomyopathy.  Normal RV size and systolic function.  No hemodynamically significant valve disease.  IVC is normal in size and collapsible.  ______________________________________________________________________________  Left Ventricle  The left ventricle is normal in size. There is normal left ventricular wall  thickness. The visual ejection fraction is 30-35%. Diastolic Doppler findings  (E/E' ratio and/or other parameters) suggest left ventricular filling  pressures are increased.     Right Ventricle  The right ventricle is normal in size and function.     Atria  Normal left atrial size. Right atrial size is normal.     Mitral Valve  There is mild mitral annular calcification. There is trace mitral  regurgitation.  There is no mitral valve stenosis.     Tricuspid Valve  The tricuspid valve is not well visualized, but is grossly normal. Right  ventricular systolic pressure could not be approximated due to inadequate  tricuspid regurgitation.     Aortic Valve  The aortic valve is not well visualized. The aortic valve is trileaflet with  aortic valve sclerosis. No aortic regurgitation is present. No hemodynamically  significant valvular aortic stenosis.     Pulmonic Valve  The pulmonic valve is not well visualized.     Vessels  The aortic root is not well visualized. The aortic root is normal size. The  inferior vena cava was normal in size with preserved respiratory variability.     Pericardium  There is no pericardial effusion.     ______________________________________________________________________________  MMode/2D Measurements & Calculations  IVSd: 0.92 cm  LVIDd: 4.0 cm  LVIDs: 2.6 cm  LVPWd: 0.86 cm  FS: 36.9 %  LV mass(C)d: 110.4 grams  LV mass(C)dI: 72.2 grams/m2     Ao root diam: 3.0 cm  Ao root diam index Ht(cm/m): 1.9  Ao root diam index BSA (cm/m2): 2.0  RWT: 0.42     Doppler Measurements & Calculations  MV E max perfecto: 99.4 cm/sec  MV A max perfecto: 118.0 cm/sec  MV E/A: 0.84  MV dec time: 0.12 sec  E/E' av.2  Lateral E/e': 15.0  Medial E/e': 19.4  RV S Perfecto: 12.0 cm/sec     ______________________________________________________________________________  Report approved by: Cheryl Mcclelland 10/03/2023 01:01 PM         Chest XR, PA & LAT [[id number]] Resulted: 10/03/23 0823   Order Status: Completed Updated: 10/03/23 0824   Narrative:     XR CHEST 2 VIEWS 10/3/2023 8:17 AM    HISTORY: chest pain    COMPARISON: X-ray 2022   Impression:     IMPRESSION: There is a small upper lobar perihilar opacity which more  likely reflects superimposed vessels and rib rather than a focal  infiltrate. As a precaution, recommend x-ray follow-up within 4 weeks.  The lungs are otherwise clear. No pleural effusion. Upper normal  heart  size. No vascular congestion or pulmonary edema. Mild degenerative  changes.    ARELY GARCIA MD        SYSTEM ID:  [id number]       Devin Mcgregor MD

## 2023-10-07 NOTE — ANESTHESIA CARE TRANSFER NOTE
Patient: Shirley Hendricks    Procedure: Procedure(s):  Right anterior tibial bypass with graft, Right tibial endarterectomy,thrombectomy, Right doraslis pedis thrombectomy, Anterior Tibial vein patch.       Diagnosis: Pain of right lower extremity [M79.604]  Diagnosis Additional Information: No value filed.    Anesthesia Type:   General     Note:    Oropharynx: oropharynx clear of all foreign objects  Level of Consciousness: awake  Oxygen Supplementation: face mask    Independent Airway: airway patency satisfactory and stable  Dentition: dentition unchanged  Vital Signs Stable: post-procedure vital signs reviewed and stable  Report to RN Given: handoff report given  Patient transferred to: ICU  Comments: BP: (!) 186/87  Pulse: 79  Resp: (!) 8  SpO2: 98 %  Temp: 36.2  C (97.2  F)    Transferred to ICU RN. Patient awake and verbal. Spontaneous resp and on room air. Monitors and alarms on. VSS. Report given.    ICU Handoff: Call for PAUSE to initiate/utilize ICU HANDOFF, Identified Patient, Identified Responsible Provider, Reviewed the Pertinent Medical History, Discussed Surgical Course, Reviewed Intra-OP Anesthesia Management and Issues during Anesthesia, Set Expectations for Post Procedure Period and Allowed Opportunity for Questions and Acknowledgement of Understanding    Vitals:  Vitals Value Taken Time   BP     Temp     Pulse     Resp     SpO2         Electronically Signed By: NOELLE Pyle CRNA  October 7, 2023  4:44 AM

## 2023-10-07 NOTE — OP NOTE
VASCULAR SURGERY OPERATIVE NOTE    Preoperative diagnosis: Thrombosis right femoral to anterior tibial cadaveric graft with acute limb ischemia    Postoperative diagnosis: Same    Operative procedure: #1.  Emergent right femoral and anterior tibial thrombectomy     #2. Anterior tibial endarterectomy with vein patch angioplasty     #3.  Femoral to anterior tibial Propaten 6 mm thin-walled bypass     #4.   Dorsalis pedis thrombectomy         (D-10)    Operative Surgeon: Chadwick Lozano MD   Anesthesia: General endotracheal  Preoperative medications: Cleocin 900 mg IV.    Indications: Patient had a thrombosed distal segment of the right femoral to anterior tibial cadaveric SFA graft.  This had been opened this evening with lytic therapy by interventional radiology.  She had a strong pulse within the graft and dorsalis pedis artery with the graft being mildly patent and mild narrowing at the anastomosis to the mid anterior tibial artery.  Sheath was removed in interventional radiology but she had ongoing bleeding and could not restart her heparin.  She lost several 100 cc of blood from the groin site which did finally dried out but unfortunately entire graft had occluded and she had severe rest pain foot and losing motor and sensory function.  We felt emergent surgery was indicated.    Operative procedure: Brought from ICU directly operating.  Distended general esthesia oral intubated without difficulty.  Alvarez catheter already in place.  Padding of appropriate to the left leg.  Entire right leg and foot were prepped and draped.  Timeout was called and sites were identified.    Vascular exposure: Incision was made in the right groin.  Dissection was carried down to identify the proximal portion of the femoral cadaveric graft which did still have a pulse.  This is an surgical assessment.  Encircled with Silastic Vesseloops.   Incision made over the mid anterior tibial old incision.  We identified the thrombosed  cadaveric graft and this was dissected free down to the anterior tibial artery.  A crossing vein was divided and a segment this was dilated with 0.5% Marcaine for a vein patch.  We transected the original graft after encircling the anterior tibial artery which measured approximately 2 mm with a Silastic vessel loop.  Anterior tibial appeared to be relatively disease-free.    Endarterectomy/thrombectomy/vein patch: 5000 units intravenous heparin given.  We then passed a #2 Emily distally anterior tibial artery moving a small amount of thrombus.  Very poor backbleeding.  Irrigated with heparinized saline solution.  We then opened up the old cadaveric graft and extended this onto the anterior tibial artery.  There was evidence of intimal hyperplasia at the previous anastomosis and and this was excised and endarterectomy type fashion with very smooth plane.  We passed her folders again with very minimal backbleeding but did not flushed with heparin saline.  Emily was passed retrograde anteriorly up the anterior tibial artery with a small amount of backbleeding.  We then used our harvested vein patch it was spatulated distally sewn from the distal SFA graft onto the anterior tibial artery for 1 cm  with running 7-0 Prolene suture.    Femoral thrombectomy someone with: We then transected the femoral graft tighten the Vesseloops.  There was some fresh thrombus that was easily removed.  This excepted a 5 mm dilator and irrigated with heparinized saline solution.  Regional graft was trams transversely.    PTFE bypass: Selected a 6 mm thin-walled ringed Propaten PTFE graft.  This was stressed to be appropriate resting length.  This was sewn end-to-end to the proximal cadaveric SFA femoral graft with running 6-0 Prolene suture and loupe magnification.  Excellent inflow was noted.  A Ripley tunneler was used to make a subcutaneous tunnel from the Incision lateral to the knee up to the groin and the graft was brought through  this in a brace gentle curve.  We spatulated the distal graft and this was sewn onto the old hanson up to the total of the vein patch with running 6-0 Prolene suture    Hemostasis: Continues to the Vesseloops.  A very strong pulse within the graft and anterior tibial artery and biphasic Doppler flow.  However we had significant needle hole bleeding on both proximally and distally likely related to her lytic therapy.  Due to her recent MI we did not want to reverse the heparin.  Partially releasing the vessel loop to decrease the flow Surgicel, Surgi-Tristin, Gelfoam and thrombin, and time for the needle hole bleeding to gradually improved.  We did place multiple 607 0 Prolene sutures of both the sites.  Hemostasis took 3 hours in total and explain almost all the blood loss.      Dorsalis pedis thrombectomy: Still do not have flow but the dorsalis pedis artery reviewed from the prior angiogram that this was obtained vessel quite small.  As we are drying up the field and made incision over the dorsal foot.  Dissection was carried down to identify the dorsalis pedis artery that measured slightly over 1 mm in diameter but free of disease.  There was no pulse.  Proximal and distal Silastic Vesseloops were placed.  Transverse arteriotomy with a #11 blade.  I passed a #2 Emily proximally up to her previous anastomosis and a small amount of thrombus was removed and began excellent and pulsatile inflow.  Alvarez was passed distally with some backbleeding being noted.  The arteriotomy was repaired with interrupted 7-0 Prolene sutures with good pulse and Doppler signal noted.    When we finally had hemostasis we closed the right groin incision with interrupted 3-0 Vicryl deep in the simple and mattress 4-0 nylon sutures.  Incision was closed with 6 simple and mattress 3-0 nylon sutures.  Dorsalis pedis incision closed with interrupted simple and mattress 4-0 nylon suture.    Bacitracin ointment to all areas.  Gauze and Kerlix to be  14 calf and Medipore to the groin      Patient was left intubated and returned to ICU.  She is hemodynamically stable.  Estimated blood loss approximate 1100 mL received 2 units of packed red blood cells intraoperatively stable hemodynamics    Procedure was extremely difficult due to the emergent nature, redo surgery, index extensive bleeding from anastomosis.  Total operative time was 4 hours and 30 minutes      Chadwick Lozano MD
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
## 2023-10-07 NOTE — PROGRESS NOTES
Vascular surgery note    Some intermittent R foot numbness that resolved with repositioning. Incisional pain. Not out of bed yet.    VS reviewed, systolic bp  mmHg  UOP 20 ml/h since 0700    Remains motor and sensory intact on my exam with biphasic right DP pulse.  Feet warm  Dressings intact, small amt drainage on calf dressing.    Hgb 10.0 immediately post-op, suspect hgb not equilibrated at the time of lab draw. Repeat hgb tonight.    Continue heparin at 500 u/hr and aspirin  Amlodipine and lisinopril held until bp improves  Continue IVF with additional bolus now for oliguria  Alvarez catheter to remain in place    Yamil Villagomez MD

## 2023-10-07 NOTE — ANESTHESIA PROCEDURE NOTES
Airway       Patient location during procedure: OR       Procedure Start/Stop Times: 10/6/2023 11:48 PM  Staff -        Anesthesiologist:  Jenae Braga       CRNA: Nelly Owens APRN CRNA       Performed By: CRNAIndications and Patient Condition       Indications for airway management: lydia-procedural       Induction type:intravenous       Mask difficulty assessment: 1 - vent by mask    Final Airway Details       Final airway type: endotracheal airway       Successful airway: ETT - single  Endotracheal Airway Details        ETT size (mm): 7.0       Cuffed: yes       Successful intubation technique: video laryngoscopy       VL Blade Size: John 3       Grade View of Cords: 1       Adjucts: stylet       Position: Right       Measured from: lips       Secured at (cm): 19       Bite block used: None    Post intubation assessment        Placement verified by: capnometry, equal breath sounds and chest rise        Number of attempts at approach: 1       Number of other approaches attempted: 0       Secured with: silk tape       Ease of procedure: easy       Dentition: Intact and Unchanged    Medication(s) Administered   Medication Administration Time: 10/6/2023 11:48 PM

## 2023-10-07 NOTE — IR NOTE
IR    Paged about bleeding from right groin access site.  Not surprising given access location, previous anticoagulation.  Manual compression was applied in IR at completion of procedure for 45 minutes.  Access is in proximal aspect of surgical bypass graft.  Patient has bled again, and additional manual compression required.    Heparin had not yet been started.  Would start once groin access site is stable over 3 hours.    Patient has sluggish flow in bypass graft and single vessel runoff via the ISIDRO.  Therefore, hope is to avoid use of a femstop as would be concerned for risk of rethrombosing the graft.    Trying to order STAT Hgb and type and cross (chart opened elsewhere).  Transfuse to keep Hgb above 7, if applicable.    Unfortunately, patient at risk to bleed from site, and limited options for control other than manual compression.    Jordan Salcedo MD

## 2023-10-08 LAB
APTT PPP: 54 SECONDS (ref 22–38)
APTT PPP: 55 SECONDS (ref 22–38)
CREAT SERPL-MCNC: 0.81 MG/DL (ref 0.51–0.95)
EGFRCR SERPLBLD CKD-EPI 2021: 81 ML/MIN/1.73M2
ERYTHROCYTE [DISTWIDTH] IN BLOOD BY AUTOMATED COUNT: 15.8 % (ref 10–15)
GLUCOSE BLDC GLUCOMTR-MCNC: 81 MG/DL (ref 70–99)
GLUCOSE BLDC GLUCOMTR-MCNC: 81 MG/DL (ref 70–99)
HCT VFR BLD AUTO: 22.2 % (ref 35–47)
HCT VFR BLD AUTO: 22.7 % (ref 35–47)
HGB BLD-MCNC: 7.4 G/DL (ref 11.7–15.7)
HGB BLD-MCNC: 7.6 G/DL (ref 11.7–15.7)
HOLD SPECIMEN HIT: NORMAL
MAGNESIUM SERPL-MCNC: 1.6 MG/DL (ref 1.7–2.3)
MCH RBC QN AUTO: 28.6 PG (ref 26.5–33)
MCHC RBC AUTO-ENTMCNC: 32.6 G/DL (ref 31.5–36.5)
MCV RBC AUTO: 88 FL (ref 78–100)
PF4 HEPARIN CMPLX AB SER QL: NEGATIVE
PLATELET # BLD AUTO: 86 10E3/UL (ref 150–450)
POTASSIUM SERPL-SCNC: 4.8 MMOL/L (ref 3.4–5.3)
RBC # BLD AUTO: 2.59 10E6/UL (ref 3.8–5.2)
WBC # BLD AUTO: 6.1 10E3/UL (ref 4–11)

## 2023-10-08 PROCEDURE — 99232 SBSQ HOSP IP/OBS MODERATE 35: CPT | Performed by: HOSPITALIST

## 2023-10-08 PROCEDURE — 250N000013 HC RX MED GY IP 250 OP 250 PS 637: Performed by: STUDENT IN AN ORGANIZED HEALTH CARE EDUCATION/TRAINING PROGRAM

## 2023-10-08 PROCEDURE — 250N000009 HC RX 250: Performed by: STUDENT IN AN ORGANIZED HEALTH CARE EDUCATION/TRAINING PROGRAM

## 2023-10-08 PROCEDURE — 120N000013 HC R&B IMCU

## 2023-10-08 PROCEDURE — 85014 HEMATOCRIT: CPT | Performed by: HOSPITALIST

## 2023-10-08 PROCEDURE — 86022 PLATELET ANTIBODIES: CPT | Performed by: STUDENT IN AN ORGANIZED HEALTH CARE EDUCATION/TRAINING PROGRAM

## 2023-10-08 PROCEDURE — 272N000452 HC KIT SHRLOCK 5FR POWER PICC TRIPLE LUMEN

## 2023-10-08 PROCEDURE — 250N000011 HC RX IP 250 OP 636: Mod: JZ | Performed by: STUDENT IN AN ORGANIZED HEALTH CARE EDUCATION/TRAINING PROGRAM

## 2023-10-08 PROCEDURE — 36569 INSJ PICC 5 YR+ W/O IMAGING: CPT

## 2023-10-08 PROCEDURE — 36415 COLL VENOUS BLD VENIPUNCTURE: CPT

## 2023-10-08 PROCEDURE — 85027 COMPLETE CBC AUTOMATED: CPT | Performed by: STUDENT IN AN ORGANIZED HEALTH CARE EDUCATION/TRAINING PROGRAM

## 2023-10-08 PROCEDURE — 84132 ASSAY OF SERUM POTASSIUM: CPT | Performed by: STUDENT IN AN ORGANIZED HEALTH CARE EDUCATION/TRAINING PROGRAM

## 2023-10-08 PROCEDURE — 36415 COLL VENOUS BLD VENIPUNCTURE: CPT | Performed by: STUDENT IN AN ORGANIZED HEALTH CARE EDUCATION/TRAINING PROGRAM

## 2023-10-08 PROCEDURE — 83735 ASSAY OF MAGNESIUM: CPT | Performed by: HOSPITALIST

## 2023-10-08 PROCEDURE — 250N000013 HC RX MED GY IP 250 OP 250 PS 637: Performed by: SURGERY

## 2023-10-08 PROCEDURE — 85730 THROMBOPLASTIN TIME PARTIAL: CPT | Performed by: STUDENT IN AN ORGANIZED HEALTH CARE EDUCATION/TRAINING PROGRAM

## 2023-10-08 PROCEDURE — 82565 ASSAY OF CREATININE: CPT | Performed by: STUDENT IN AN ORGANIZED HEALTH CARE EDUCATION/TRAINING PROGRAM

## 2023-10-08 PROCEDURE — 85730 THROMBOPLASTIN TIME PARTIAL: CPT

## 2023-10-08 RX ORDER — LIDOCAINE 40 MG/G
CREAM TOPICAL
Status: DISCONTINUED | OUTPATIENT
Start: 2023-10-08 | End: 2023-10-08

## 2023-10-08 RX ORDER — HEPARIN SODIUM 10000 [USP'U]/100ML
0-5000 INJECTION, SOLUTION INTRAVENOUS CONTINUOUS
Status: DISCONTINUED | OUTPATIENT
Start: 2023-10-08 | End: 2023-10-12

## 2023-10-08 RX ORDER — NITROGLYCERIN 0.4 MG/1
0.4 TABLET SUBLINGUAL EVERY 5 MIN PRN
Status: DISCONTINUED | OUTPATIENT
Start: 2023-10-08 | End: 2023-10-08

## 2023-10-08 RX ORDER — FERROUS SULFATE 325(65) MG
325 TABLET ORAL 2 TIMES DAILY
Status: DISCONTINUED | OUTPATIENT
Start: 2023-10-08 | End: 2023-10-12

## 2023-10-08 RX ADMIN — GABAPENTIN 100 MG: 100 CAPSULE ORAL at 14:25

## 2023-10-08 RX ADMIN — POLYETHYLENE GLYCOL 3350 17 G: 17 POWDER, FOR SOLUTION ORAL at 08:49

## 2023-10-08 RX ADMIN — OMEPRAZOLE 20 MG: 20 CAPSULE, DELAYED RELEASE ORAL at 08:49

## 2023-10-08 RX ADMIN — ROSUVASTATIN CALCIUM 40 MG: 20 TABLET, FILM COATED ORAL at 21:44

## 2023-10-08 RX ADMIN — HEPARIN SODIUM 1000 UNITS/HR: 10000 INJECTION, SOLUTION INTRAVENOUS at 17:28

## 2023-10-08 RX ADMIN — OXYCODONE HYDROCHLORIDE 10 MG: 5 TABLET ORAL at 21:42

## 2023-10-08 RX ADMIN — SENNOSIDES AND DOCUSATE SODIUM 1 TABLET: 50; 8.6 TABLET ORAL at 08:49

## 2023-10-08 RX ADMIN — CARVEDILOL 6.25 MG: 6.25 TABLET, FILM COATED ORAL at 08:49

## 2023-10-08 RX ADMIN — ACETAMINOPHEN 975 MG: 325 TABLET, FILM COATED ORAL at 14:25

## 2023-10-08 RX ADMIN — ACETAMINOPHEN 975 MG: 325 TABLET, FILM COATED ORAL at 06:07

## 2023-10-08 RX ADMIN — LIDOCAINE HYDROCHLORIDE ANHYDROUS 1 ML: 10 INJECTION, SOLUTION INFILTRATION at 17:13

## 2023-10-08 RX ADMIN — GABAPENTIN 100 MG: 100 CAPSULE ORAL at 21:41

## 2023-10-08 RX ADMIN — FLUTICASONE FUROATE AND VILANTEROL TRIFENATATE 1 PUFF: 200; 25 POWDER RESPIRATORY (INHALATION) at 08:49

## 2023-10-08 RX ADMIN — SENNOSIDES AND DOCUSATE SODIUM 1 TABLET: 50; 8.6 TABLET ORAL at 20:12

## 2023-10-08 RX ADMIN — NICOTINE 1 PATCH: 14 PATCH, EXTENDED RELEASE TRANSDERMAL at 14:29

## 2023-10-08 RX ADMIN — Medication 1 TABLET: at 08:49

## 2023-10-08 RX ADMIN — DOCUSATE SODIUM 100 MG: 100 CAPSULE, LIQUID FILLED ORAL at 08:49

## 2023-10-08 RX ADMIN — CARVEDILOL 6.25 MG: 6.25 TABLET, FILM COATED ORAL at 18:07

## 2023-10-08 RX ADMIN — OXYCODONE HYDROCHLORIDE 5 MG: 5 TABLET ORAL at 12:43

## 2023-10-08 RX ADMIN — ACETAMINOPHEN 650 MG: 325 TABLET, FILM COATED ORAL at 20:22

## 2023-10-08 RX ADMIN — TRIAMCINOLONE ACETONIDE: 0.25 CREAM TOPICAL at 08:49

## 2023-10-08 RX ADMIN — GABAPENTIN 100 MG: 100 CAPSULE ORAL at 08:49

## 2023-10-08 RX ADMIN — DOCUSATE SODIUM 100 MG: 100 CAPSULE, LIQUID FILLED ORAL at 20:12

## 2023-10-08 RX ADMIN — FERROUS SULFATE TAB 325 MG (65 MG ELEMENTAL FE) 325 MG: 325 (65 FE) TAB at 08:49

## 2023-10-08 RX ADMIN — FERROUS SULFATE TAB 325 MG (65 MG ELEMENTAL FE) 325 MG: 325 (65 FE) TAB at 20:12

## 2023-10-08 RX ADMIN — Medication 1 TABLET: at 18:07

## 2023-10-08 RX ADMIN — ASPIRIN 81 MG 81 MG: 81 TABLET ORAL at 08:49

## 2023-10-08 RX ADMIN — EMPAGLIFLOZIN 10 MG: 10 TABLET, FILM COATED ORAL at 08:49

## 2023-10-08 ASSESSMENT — ACTIVITIES OF DAILY LIVING (ADL)
ADLS_ACUITY_SCORE: 28
ADLS_ACUITY_SCORE: 24
ADLS_ACUITY_SCORE: 24
ADLS_ACUITY_SCORE: 28
ADLS_ACUITY_SCORE: 28
ADLS_ACUITY_SCORE: 24
ADLS_ACUITY_SCORE: 28
ADLS_ACUITY_SCORE: 24
ADLS_ACUITY_SCORE: 28

## 2023-10-08 NOTE — PLAN OF CARE
Patient transferred from ICU to room 313 by wheelchair with RN and CNA. All belongings and medications sent with patient. RN to RN report called. Sister Yue called and aware of transfer.

## 2023-10-08 NOTE — PROGRESS NOTES
10/07/23 ~ 10/08/23   1900--0730:   - 1940: Dr. Villagomez verbal to Stop Heparin gtt, switching to Argatroban gtt.  - 2030: Argatroban gtt Started @ 0.5mcg/kg/min. Baseline PTT 49. PTT Goal: 44-88 sec.  - 2230: PTT 44, recheck 0030.  - 0050: PTT 55, PTT x2 w/in range- recheck Next AM.   - 0545: Hgb 7.4 (prior Hgb 7.9)         Neuro  - LOC: A/Ox4, follows commands. Speech: clear. Pupils: 3mm, equal, round, reactive.  - LUE 5/5.  -RUE 5/5.  -LLE 4/5.  -RLE 4/5. Generalized weakness     Cardiac  - SR, HR 60-80s, SBP 100s-170s, MAP >65.   - Pulses: RLE  DP +2, PT Doppler.     Resp   - 2L NC, SpO2 >90%. LS: Clear. Reg/Unlabored.     GI  - Regular Diet, tolerating liquids. Abdomen: active, soft, nontender.       - Alvarez catheter in place. UOP adequate     Glucose   - WDL     Skin  - RLE incisions x3, dressings- WDL, Gauze/Kerlix.     Pain  - c/o RLE pain- aching, Oxycodone 10mg given + sched. Tylenol.     Gtts/Infusions  - Argatroban 0.5mcg/kg/min.

## 2023-10-08 NOTE — PROGRESS NOTES
Concern for HIT with platelet level of 72,000 tonight, down from 120k this AM and 177k yesterday. Likely consumptive thrombocytopenia after bleeding events and surgery last night, though she does have significant exposure to heparin since admission on 10/4/23 with thrombosis of right leg bypass graft (now s/p re-do right femoral to anterior tibial artery bypass with PTFE which is currently patent).     Heparin drip stopped and argatroban infusion ordered. Rule out HIT.    Mild hypotension and oliguria resolved with 500 ml crystalloid bolus earlier.    Discussed with pharmacist, nurse, and staff Dr. Enciso.    Yamil Villagomez MD

## 2023-10-08 NOTE — SIGNIFICANT EVENT
Significant Event Note    Time of event: 1:54 AM October 8, 2023    Description of event:  Concern of excessive blood draws with transition from Heparin order set to argatroban order set    Plan:  I agree with the nurse, excessive blood draws since the discontinuation of the heparin drip due to automatic EMR reflex orders in the argatroban drip. PTTs have been in range. Can delay next blood draw till after 6am. Hot pack for arms given repeated blood draws      Carlos Hunter MD

## 2023-10-08 NOTE — PROGRESS NOTES
Messaged Dr Villagomez: Pt orders tpo switch from argatroban to heparin gtt. She is an extremely hard poke and her PTT is therapeutic now on argatroban. Does she need to switch?? About 12 hrs left from current infusion.    Replied: PICC line ordered. OK to start heparin gtt after PICC is placed.

## 2023-10-08 NOTE — PROCEDURES
[facility]    Triple Lumen PICC Placement    Date/Time: 10/8/2023 5:32 PM    Performed by: Rosemary Otoole RN  Authorized by: Yamil Villagomez MD  Indications: vascular access      UNIVERSAL PROTOCOL   Site Marked: Yes  Prior Images Obtained and Reviewed:  Yes  Required items: Required blood products, implants, devices and special equipment available    Patient identity confirmed:  Verbally with patient, arm band, provided demographic data and hospital-assigned identification number  NA - No sedation, light sedation, or local anesthesia  Confirmation Checklist:  Patient's identity using two indicators, relevant allergies, procedure was appropriate and matched the consent or emergent situation and correct equipment/implants were available  Time out: Immediately prior to the procedure a time out was called    Universal Protocol: the Joint Commission Universal Protocol was followed    Preparation: Patient was prepped and draped in usual sterile fashion       ANESTHESIA    Anesthesia:  Local infiltration  Local Anesthetic:  Lidocaine 1% without epinephrine  Anesthetic Total (mL):  1      SEDATION    Patient Sedated: No        Preparation: skin prepped with 2% chlorhexidine  Skin prep agent: skin prep agent completely dried prior to procedure  Sterile barriers: maximum sterile barriers were used: cap, mask, sterile gown, sterile gloves, and large sterile sheet  Hand hygiene: hand hygiene performed prior to central venous catheter insertion  Type of line used: PICC  Catheter type: triple lumen  Lumen type: power PICC and valved  Lumen Identification: White, Red and Gray  Catheter size: 5 Fr  Brand: Bard  Lot number: [id number]  Placement method: MST, ultrasound and tip navigation system  Number of attempts: 1  Difficulty threading catheter: no  Successful placement: yes  Orientation: right    Location: basilic vein  Tip Location: SVC/RA Junction  Arm circumference: adults 10 cm  Extremity  circumference: 25  Visible catheter length: 7  Total catheter length: 45  Dressing and securement: adhesive securement device, alcohol impregnated caps, chlorhexidine disc applied, gloves changed prior to final dressing, subcutaneous anchor securement system, sterile dressing applied and transparent dressing  Post procedure assessment: blood return through all ports, free fluid flow and placement verified by 3CG technology  PROCEDURE   Disposal: sharps and needle count correct at the end of procedure, needles and guidewire disposed in sharps container
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
## 2023-10-08 NOTE — PROGRESS NOTES
"Vascular Surgery Progress Note    Slept poorly, frequent lab draws for PTT monitoring. RLE incisional pain but no pain R foot, denies numbnes and no weakness from baseline.    /56   Pulse 56   Temp 97.9  F (36.6  C) (Oral)   Resp 11   Ht 1.6 m (5' 3\")   Wt 56.6 kg (124 lb 12.5 oz)   LMP  (LMP Unknown)   SpO2 96%   BMI 22.10 kg/m      I/O last 3 completed shifts:  In: 5711.6 [P.O.:1440; I.V.:2671.6; IV Piggyback:500]  Out: 2620 [Urine:1920; Blood:700]    Exam  Resting comfortably in bed, awake, alert, appropriate  Nonlabored breathing  R groin, calf, and foot incisions with intact dressing, stable and small amt dried drainage on calf  Calf soft, foot warm, normal color  Intact motor and sensory function  Biphasic right DP doppler signal    K 4.8  Mg 1.6  WBC 6.1  Hgb 7.4 from 7.9  Plts 86 (72, 103)    A/P:  POD#1 s/p AT and DP thrombectomy, re-do right femoral to AT bypass with PTFE. Graft remains patent. Switched to argatroban yesterday for concern for HIT. No hypotension overnight, appropriate UOP  -Continue argatroban drip and aspirin, awaiting HIT labs  -Remove leegr catheter  [place] [facility] [place]  -Encourage ambulation today    Yamil Villagomez MD    STAFF: As above.  Doing well today.  Good Doppler signals in AT/DP with normal CMS.    Surgical dressings all intact with no bleeding.      Possible HIT with decreased platelets.  Checking BS.  Somewhat unlikely with multiple heparin exposures but to be sure and with clotting issues switchblade of Dr. Cano and aspirin.     Acute blood loss anemia-received 2 units packed red blood cells perioperatively.  Hemodynamically stable.  Initiate iron.    A[place] with [place] [facility] [place]  Remove [place].  Advance diet.  Follow-up laboratories.    Chadwick Lozano MD            "
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
## 2023-10-08 NOTE — PLAN OF CARE
A&O x4, VSS on RA during and 2L O2 during sleep. Pain controlled with pepe tylenol and prn oxy. Tele: SR. PICC line placed and Heparin infusing at 1000, 1st Xa at 0000. HGB 7.6. R groin soft, tender, dressing CDI. R shin and foot dressings CDI. Pulses present and palpable, posterior tibial w/ doppler. +void, -BM. Up A-2 pivot to commode.

## 2023-10-09 ENCOUNTER — APPOINTMENT (OUTPATIENT)
Dept: PHYSICAL THERAPY | Facility: CLINIC | Age: 63
DRG: 270 | End: 2023-10-09
Attending: STUDENT IN AN ORGANIZED HEALTH CARE EDUCATION/TRAINING PROGRAM
Payer: COMMERCIAL

## 2023-10-09 LAB
ALBUMIN SERPL ELPH-MCNC: 3.2 G/DL (ref 3.7–5.1)
ALPHA1 GLOB SERPL ELPH-MCNC: 0.4 G/DL (ref 0.2–0.4)
ALPHA2 GLOB SERPL ELPH-MCNC: 0.7 G/DL (ref 0.5–0.9)
ANION GAP SERPL CALCULATED.3IONS-SCNC: 9 MMOL/L (ref 7–15)
B-GLOBULIN SERPL ELPH-MCNC: 0.6 G/DL (ref 0.6–1)
BUN SERPL-MCNC: 10.8 MG/DL (ref 8–23)
CALCIUM SERPL-MCNC: 7.9 MG/DL (ref 8.8–10.2)
CHLORIDE SERPL-SCNC: 104 MMOL/L (ref 98–107)
CREAT SERPL-MCNC: 0.75 MG/DL (ref 0.51–0.95)
DEPRECATED HCO3 PLAS-SCNC: 24 MMOL/L (ref 22–29)
EGFRCR SERPLBLD CKD-EPI 2021: 88 ML/MIN/1.73M2
ERYTHROCYTE [DISTWIDTH] IN BLOOD BY AUTOMATED COUNT: 15.3 % (ref 10–15)
GAMMA GLOB SERPL ELPH-MCNC: 0.8 G/DL (ref 0.7–1.6)
GLUCOSE SERPL-MCNC: 82 MG/DL (ref 70–99)
HAPTOGLOB SERPL-MCNC: 76 MG/DL (ref 32–197)
HCT VFR BLD AUTO: 21.1 % (ref 35–47)
HCT VFR BLD AUTO: 22.8 % (ref 35–47)
HGB BLD-MCNC: 7.1 G/DL (ref 11.7–15.7)
HGB BLD-MCNC: 7.6 G/DL (ref 11.7–15.7)
IGA SERPL-MCNC: 171 MG/DL (ref 84–499)
IGG SERPL-MCNC: 859 MG/DL (ref 610–1616)
IGM SERPL-MCNC: 46 MG/DL (ref 35–242)
M PROTEIN SERPL ELPH-MCNC: 0 G/DL
MAGNESIUM SERPL-MCNC: 1.6 MG/DL (ref 1.7–2.3)
MCH RBC QN AUTO: 29.2 PG (ref 26.5–33)
MCHC RBC AUTO-ENTMCNC: 33.3 G/DL (ref 31.5–36.5)
MCV RBC AUTO: 88 FL (ref 78–100)
PATH REPORT.COMMENTS IMP SPEC: NORMAL
PATH REPORT.FINAL DX SPEC: NORMAL
PATH REPORT.FINAL DX SPEC: NORMAL
PATH REPORT.MICROSCOPIC SPEC OTHER STN: NORMAL
PATH REPORT.RELEVANT HX SPEC: NORMAL
PLATELET # BLD AUTO: 118 10E3/UL (ref 150–450)
POTASSIUM SERPL-SCNC: 4.1 MMOL/L (ref 3.4–5.3)
PROT PATTERN SERPL ELPH-IMP: ABNORMAL
PROT PATTERN SERPL IFE-IMP: NORMAL
RBC # BLD AUTO: 2.6 10E6/UL (ref 3.8–5.2)
SODIUM SERPL-SCNC: 137 MMOL/L (ref 135–145)
UFH PPP CHRO-ACNC: 0.69 IU/ML
UFH PPP CHRO-ACNC: 0.82 IU/ML
UFH PPP CHRO-ACNC: 0.83 IU/ML
WBC # BLD AUTO: 6.1 10E3/UL (ref 4–11)

## 2023-10-09 PROCEDURE — 250N000013 HC RX MED GY IP 250 OP 250 PS 637: Performed by: STUDENT IN AN ORGANIZED HEALTH CARE EDUCATION/TRAINING PROGRAM

## 2023-10-09 PROCEDURE — 120N000013 HC R&B IMCU

## 2023-10-09 PROCEDURE — 85018 HEMOGLOBIN: CPT | Performed by: HOSPITALIST

## 2023-10-09 PROCEDURE — 83735 ASSAY OF MAGNESIUM: CPT | Performed by: HOSPITALIST

## 2023-10-09 PROCEDURE — 97530 THERAPEUTIC ACTIVITIES: CPT | Mod: GP

## 2023-10-09 PROCEDURE — 97161 PT EVAL LOW COMPLEX 20 MIN: CPT | Mod: GP

## 2023-10-09 PROCEDURE — 85520 HEPARIN ASSAY: CPT | Performed by: SURGERY

## 2023-10-09 PROCEDURE — 99232 SBSQ HOSP IP/OBS MODERATE 35: CPT | Performed by: INTERNAL MEDICINE

## 2023-10-09 PROCEDURE — 80048 BASIC METABOLIC PNL TOTAL CA: CPT | Performed by: HOSPITALIST

## 2023-10-09 PROCEDURE — 85520 HEPARIN ASSAY: CPT | Performed by: INTERNAL MEDICINE

## 2023-10-09 PROCEDURE — 97116 GAIT TRAINING THERAPY: CPT | Mod: GP

## 2023-10-09 PROCEDURE — 85520 HEPARIN ASSAY: CPT | Performed by: HOSPITALIST

## 2023-10-09 PROCEDURE — 85027 COMPLETE CBC AUTOMATED: CPT | Performed by: STUDENT IN AN ORGANIZED HEALTH CARE EDUCATION/TRAINING PROGRAM

## 2023-10-09 PROCEDURE — 999N000040 HC STATISTIC CONSULT NO CHARGE VASC ACCESS

## 2023-10-09 PROCEDURE — 250N000013 HC RX MED GY IP 250 OP 250 PS 637: Performed by: SURGERY

## 2023-10-09 RX ORDER — NALOXONE HYDROCHLORIDE 0.4 MG/ML
0.4 INJECTION, SOLUTION INTRAMUSCULAR; INTRAVENOUS; SUBCUTANEOUS
Status: DISCONTINUED | OUTPATIENT
Start: 2023-10-09 | End: 2023-10-09

## 2023-10-09 RX ORDER — NALOXONE HYDROCHLORIDE 0.4 MG/ML
0.2 INJECTION, SOLUTION INTRAMUSCULAR; INTRAVENOUS; SUBCUTANEOUS
Status: DISCONTINUED | OUTPATIENT
Start: 2023-10-09 | End: 2023-10-09

## 2023-10-09 RX ORDER — FENTANYL CITRATE 50 UG/ML
25-50 INJECTION, SOLUTION INTRAMUSCULAR; INTRAVENOUS EVERY 5 MIN PRN
Status: DISCONTINUED | OUTPATIENT
Start: 2023-10-09 | End: 2023-10-09

## 2023-10-09 RX ORDER — FLUMAZENIL 0.1 MG/ML
0.2 INJECTION, SOLUTION INTRAVENOUS
Status: DISCONTINUED | OUTPATIENT
Start: 2023-10-09 | End: 2023-10-09

## 2023-10-09 RX ADMIN — FERROUS SULFATE TAB 325 MG (65 MG ELEMENTAL FE) 325 MG: 325 (65 FE) TAB at 09:07

## 2023-10-09 RX ADMIN — Medication 1 TABLET: at 17:15

## 2023-10-09 RX ADMIN — ACETAMINOPHEN 975 MG: 325 TABLET, FILM COATED ORAL at 14:08

## 2023-10-09 RX ADMIN — MAGNESIUM HYDROXIDE 30 ML: 400 SUSPENSION ORAL at 14:07

## 2023-10-09 RX ADMIN — NICOTINE 1 PATCH: 14 PATCH, EXTENDED RELEASE TRANSDERMAL at 14:08

## 2023-10-09 RX ADMIN — SENNOSIDES AND DOCUSATE SODIUM 1 TABLET: 50; 8.6 TABLET ORAL at 21:14

## 2023-10-09 RX ADMIN — OMEPRAZOLE 20 MG: 20 CAPSULE, DELAYED RELEASE ORAL at 09:07

## 2023-10-09 RX ADMIN — POLYETHYLENE GLYCOL 3350 17 G: 17 POWDER, FOR SOLUTION ORAL at 14:05

## 2023-10-09 RX ADMIN — ACETAMINOPHEN 975 MG: 325 TABLET, FILM COATED ORAL at 21:14

## 2023-10-09 RX ADMIN — ACETAMINOPHEN 975 MG: 325 TABLET, FILM COATED ORAL at 06:06

## 2023-10-09 RX ADMIN — OXYCODONE HYDROCHLORIDE 5 MG: 5 TABLET ORAL at 09:07

## 2023-10-09 RX ADMIN — ROSUVASTATIN CALCIUM 40 MG: 20 TABLET, FILM COATED ORAL at 21:14

## 2023-10-09 RX ADMIN — FLUTICASONE FUROATE AND VILANTEROL TRIFENATATE 1 PUFF: 200; 25 POWDER RESPIRATORY (INHALATION) at 09:08

## 2023-10-09 RX ADMIN — ASPIRIN 81 MG 81 MG: 81 TABLET ORAL at 09:07

## 2023-10-09 RX ADMIN — DOCUSATE SODIUM 100 MG: 100 CAPSULE, LIQUID FILLED ORAL at 21:14

## 2023-10-09 RX ADMIN — SENNOSIDES AND DOCUSATE SODIUM 1 TABLET: 50; 8.6 TABLET ORAL at 09:07

## 2023-10-09 RX ADMIN — CARVEDILOL 6.25 MG: 6.25 TABLET, FILM COATED ORAL at 09:08

## 2023-10-09 RX ADMIN — GABAPENTIN 100 MG: 100 CAPSULE ORAL at 21:14

## 2023-10-09 RX ADMIN — DOCUSATE SODIUM 100 MG: 100 CAPSULE, LIQUID FILLED ORAL at 09:07

## 2023-10-09 RX ADMIN — GABAPENTIN 100 MG: 100 CAPSULE ORAL at 14:08

## 2023-10-09 RX ADMIN — EMPAGLIFLOZIN 10 MG: 10 TABLET, FILM COATED ORAL at 09:08

## 2023-10-09 RX ADMIN — Medication 1 TABLET: at 09:06

## 2023-10-09 RX ADMIN — GABAPENTIN 100 MG: 100 CAPSULE ORAL at 09:07

## 2023-10-09 ASSESSMENT — ACTIVITIES OF DAILY LIVING (ADL)
ADLS_ACUITY_SCORE: 28
ADLS_ACUITY_SCORE: 25
ADLS_ACUITY_SCORE: 24
ADLS_ACUITY_SCORE: 24
ADLS_ACUITY_SCORE: 22
ADLS_ACUITY_SCORE: 24
ADLS_ACUITY_SCORE: 25
ADLS_ACUITY_SCORE: 24
ADLS_ACUITY_SCORE: 25
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
## 2023-10-09 NOTE — PLAN OF CARE
Orientation: A&Ox4  Vitals/Pain: VSS on RA. CMS intact. DP, PT, F, and R pulses intact. Pt reports some pain, managing with PRN and scheduled pain meds.    Tele: SR w/ prolonged QT  Lines/Drains: R PICC infusing heparin at 900 units/hr. Flushes well and draws blood. Leaking blood intermittently. Vascular access consult entered.  LS: Clear  GI/: BS +, No BM this shift. Pt having adequate urine output throughout shift   Labs: Abnormal/Trends, Electrolyte Replacement- Xa protocol. Last XA 0.83. Recheck at 0845.  Ambulation/Assist: Ax1 w/ gait belt.   Skin/Wounds: Incision in groin, calf, and top of foot. CDI. Rash on back improving. Scattered bruising over arms.  Plan: Increase ambulation as tolerated. Encourage increased water intake.     Goal Outcome Evaluation:      Plan of Care Reviewed With: patient    Overall Patient Progress: improving    Outcome Evaluation: Pt mobility improving.

## 2023-10-09 NOTE — PROVIDER NOTIFICATION
"MD Notification    Notified Person: MD    Notified Person Name: Dr. Cherryalyson    Notification Date/Time: 10/9 @ 17:55    Notification Interaction: Gerard    Purpose of Notification: \"Pt's hgb back at 7.1, down from 7.6 this AM. No e/o active bleeding. Saw the transfusion threshold is < 7 - she will have another draw at 0600 tomorrow. Do you want to give blood now or wait for 0600 hgb draw tomorrow? \"    Orders Received:    Comments:   "
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
## 2023-10-09 NOTE — PLAN OF CARE
Shift Summary ([id number]):     IMC status.   A&Ox4. VSS on room air. Wears 1 L NC overnight. C/o pain to RLE - relieved with PRN oxy. Tele shows SB w/ prolonged QT. RLE incision x3. CMS intact, pulses detectable with doppler. LS clear. Tolerating regular diet. - BM, stool softeners given. Passing flatus. R PICC infusing heparin @ 800 units/hr. K and Mg protocol WDL. Up w/ Ax1 + walker. IR consult placed for neck biopsy. Oncology/vascular following.

## 2023-10-09 NOTE — PROGRESS NOTES
Vascular Surgery Progress Note    S: Overall doing well.  Up to the commode yesterday and again this morning.   Foot is back to baseline.  No nausea.    O: No chest pain.  Vitals:  BP  Min: 98/58  Max: 167/79  Temp  Av  F (36.7  C)  Min: 97.7  F (36.5  C)  Max: 98.5  F (36.9  C)  Pulse  Av  Min: 53  Max: 75  I/O last 3 completed shifts:  In: 1116.44 [P.O.:960; I.V.:156.44]  Out: 3060 [Urine:3060]    Physical Exam: Appropriate.  Comfortable.    +3 biphasic Doppler signal right distal DP    Surgical dressings all changed=A      K= 4.1 SCr= 0.75   Hgb= 7.6    Assessment/Plan: #1.  Patent right femoral to anterior tibial PTFE bypass graft with single vessel runoff.  Good Doppler signals.  Surgical sites healing well.     #2.  Needs anticoagulation.  We will plan aspirin/Xarelto once biopsy situation clarified.  HIT test was negative and back on IV heparin     #3.  Stable acute blood loss anemia.  On iron.  Diet as tolerated.  Up ad mario.      Wm. Blake MD
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
## 2023-10-10 ENCOUNTER — APPOINTMENT (OUTPATIENT)
Dept: PHYSICAL THERAPY | Facility: CLINIC | Age: 63
DRG: 270 | End: 2023-10-10
Payer: COMMERCIAL

## 2023-10-10 LAB
CREAT SERPL-MCNC: 0.75 MG/DL (ref 0.51–0.95)
EGFRCR SERPLBLD CKD-EPI 2021: 88 ML/MIN/1.73M2
ERYTHROCYTE [DISTWIDTH] IN BLOOD BY AUTOMATED COUNT: 15.3 % (ref 10–15)
HCT VFR BLD AUTO: 22 % (ref 35–47)
HGB BLD-MCNC: 7.2 G/DL (ref 11.7–15.7)
MAGNESIUM SERPL-MCNC: 1.7 MG/DL (ref 1.7–2.3)
MCH RBC QN AUTO: 28.7 PG (ref 26.5–33)
MCHC RBC AUTO-ENTMCNC: 32.7 G/DL (ref 31.5–36.5)
MCV RBC AUTO: 88 FL (ref 78–100)
PLATELET # BLD AUTO: 142 10E3/UL (ref 150–450)
POTASSIUM SERPL-SCNC: 4 MMOL/L (ref 3.4–5.3)
RBC # BLD AUTO: 2.51 10E6/UL (ref 3.8–5.2)
UFH PPP CHRO-ACNC: 0.66 IU/ML
WBC # BLD AUTO: 7 10E3/UL (ref 4–11)

## 2023-10-10 PROCEDURE — 250N000013 HC RX MED GY IP 250 OP 250 PS 637: Performed by: STUDENT IN AN ORGANIZED HEALTH CARE EDUCATION/TRAINING PROGRAM

## 2023-10-10 PROCEDURE — 250N000013 HC RX MED GY IP 250 OP 250 PS 637: Performed by: SURGERY

## 2023-10-10 PROCEDURE — 250N000013 HC RX MED GY IP 250 OP 250 PS 637: Performed by: INTERNAL MEDICINE

## 2023-10-10 PROCEDURE — 82565 ASSAY OF CREATININE: CPT | Performed by: STUDENT IN AN ORGANIZED HEALTH CARE EDUCATION/TRAINING PROGRAM

## 2023-10-10 PROCEDURE — 85520 HEPARIN ASSAY: CPT | Performed by: INTERNAL MEDICINE

## 2023-10-10 PROCEDURE — 120N000013 HC R&B IMCU

## 2023-10-10 PROCEDURE — 250N000011 HC RX IP 250 OP 636: Mod: JZ | Performed by: STUDENT IN AN ORGANIZED HEALTH CARE EDUCATION/TRAINING PROGRAM

## 2023-10-10 PROCEDURE — 97116 GAIT TRAINING THERAPY: CPT | Mod: GP

## 2023-10-10 PROCEDURE — 97530 THERAPEUTIC ACTIVITIES: CPT | Mod: GP

## 2023-10-10 PROCEDURE — 83735 ASSAY OF MAGNESIUM: CPT | Performed by: SURGERY

## 2023-10-10 PROCEDURE — 99233 SBSQ HOSP IP/OBS HIGH 50: CPT | Performed by: INTERNAL MEDICINE

## 2023-10-10 PROCEDURE — 84132 ASSAY OF SERUM POTASSIUM: CPT | Performed by: SURGERY

## 2023-10-10 PROCEDURE — 85027 COMPLETE CBC AUTOMATED: CPT | Performed by: STUDENT IN AN ORGANIZED HEALTH CARE EDUCATION/TRAINING PROGRAM

## 2023-10-10 RX ORDER — CARVEDILOL 3.12 MG/1
3.12 TABLET ORAL 2 TIMES DAILY WITH MEALS
Status: DISCONTINUED | OUTPATIENT
Start: 2023-10-10 | End: 2023-10-14 | Stop reason: HOSPADM

## 2023-10-10 RX ADMIN — OXYCODONE HYDROCHLORIDE 5 MG: 5 TABLET ORAL at 00:26

## 2023-10-10 RX ADMIN — DOCUSATE SODIUM 100 MG: 100 CAPSULE, LIQUID FILLED ORAL at 21:17

## 2023-10-10 RX ADMIN — CARVEDILOL 3.12 MG: 3.12 TABLET, FILM COATED ORAL at 17:20

## 2023-10-10 RX ADMIN — Medication 1 TABLET: at 17:20

## 2023-10-10 RX ADMIN — ROSUVASTATIN CALCIUM 40 MG: 20 TABLET, FILM COATED ORAL at 21:17

## 2023-10-10 RX ADMIN — HEPARIN SODIUM 800 UNITS/HR: 10000 INJECTION, SOLUTION INTRAVENOUS at 00:26

## 2023-10-10 RX ADMIN — NICOTINE 1 PATCH: 14 PATCH, EXTENDED RELEASE TRANSDERMAL at 13:26

## 2023-10-10 RX ADMIN — EMPAGLIFLOZIN 10 MG: 10 TABLET, FILM COATED ORAL at 08:47

## 2023-10-10 RX ADMIN — SENNOSIDES AND DOCUSATE SODIUM 1 TABLET: 50; 8.6 TABLET ORAL at 21:17

## 2023-10-10 RX ADMIN — DOCUSATE SODIUM 100 MG: 100 CAPSULE, LIQUID FILLED ORAL at 08:46

## 2023-10-10 RX ADMIN — SENNOSIDES AND DOCUSATE SODIUM 1 TABLET: 50; 8.6 TABLET ORAL at 08:47

## 2023-10-10 RX ADMIN — GABAPENTIN 100 MG: 100 CAPSULE ORAL at 13:25

## 2023-10-10 RX ADMIN — FLUTICASONE FUROATE AND VILANTEROL TRIFENATATE 1 PUFF: 200; 25 POWDER RESPIRATORY (INHALATION) at 08:47

## 2023-10-10 RX ADMIN — ASPIRIN 81 MG 81 MG: 81 TABLET ORAL at 08:46

## 2023-10-10 RX ADMIN — POLYETHYLENE GLYCOL 3350 17 G: 17 POWDER, FOR SOLUTION ORAL at 08:46

## 2023-10-10 RX ADMIN — Medication 1 TABLET: at 08:46

## 2023-10-10 RX ADMIN — GABAPENTIN 100 MG: 100 CAPSULE ORAL at 21:17

## 2023-10-10 RX ADMIN — FERROUS SULFATE TAB 325 MG (65 MG ELEMENTAL FE) 325 MG: 325 (65 FE) TAB at 21:17

## 2023-10-10 RX ADMIN — ACETAMINOPHEN 650 MG: 325 TABLET, FILM COATED ORAL at 14:57

## 2023-10-10 RX ADMIN — ONDANSETRON 4 MG: 2 INJECTION INTRAMUSCULAR; INTRAVENOUS at 21:11

## 2023-10-10 RX ADMIN — OMEPRAZOLE 20 MG: 20 CAPSULE, DELAYED RELEASE ORAL at 08:46

## 2023-10-10 RX ADMIN — FERROUS SULFATE TAB 325 MG (65 MG ELEMENTAL FE) 325 MG: 325 (65 FE) TAB at 08:47

## 2023-10-10 RX ADMIN — GABAPENTIN 100 MG: 100 CAPSULE ORAL at 08:46

## 2023-10-10 RX ADMIN — OXYCODONE HYDROCHLORIDE 5 MG: 5 TABLET ORAL at 23:18

## 2023-10-10 RX ADMIN — CARVEDILOL 6.25 MG: 6.25 TABLET, FILM COATED ORAL at 08:46

## 2023-10-10 ASSESSMENT — ACTIVITIES OF DAILY LIVING (ADL)
ADLS_ACUITY_SCORE: 29
ADLS_ACUITY_SCORE: 29
ADLS_ACUITY_SCORE: 25
ADLS_ACUITY_SCORE: 29
ADLS_ACUITY_SCORE: 25
ADLS_ACUITY_SCORE: 25
ADLS_ACUITY_SCORE: 29
ADLS_ACUITY_SCORE: 28
ADLS_ACUITY_SCORE: 29

## 2023-10-10 NOTE — PLAN OF CARE
Shift Summary ([id number]):     IMC discontinued.   A&Ox4. VSS on room air, wears 1 L NC while sleeping. PRN tylenol given x1 for RLE pain. Tele shows SR w/ prolonged QT> RLE incision x3. Redressed. CMS intact, pulses palpable. LS clear. Up w/ Ax1 + walker. Reports of blurred vision this afternoon - MD notified. R PICC infusing heparin at 800 units/hr. Plan for neck biopsy tomorrow per ENT. Orders to stop heparin gtt @ 0130. Pt to be NPO at midnight.
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
## 2023-10-10 NOTE — PROGRESS NOTES
Vascular Surgery Progress Note    S: Had a good night.  She has increased activity.  Up walking more.  Tolerating diet.    O:   Vitals:  BP  Min: 94/55  Max: 158/92  Temp  Av.9  F (36.6  C)  Min: 97.7  F (36.5  C)  Max: 98.1  F (36.7  C)  Pulse  Av.5  Min: 54  Max: 77  I/O last 3 completed shifts:  In: 120 [P.O.:120]  Out: 2200 [Urine:2200]    Physical Exam: Alert and appropriate.  Very comfortable.   Was very tearful yesterday morning due to overall situation--appreciate  help   Wds=A   Excellent Doppler right DP.  Foot warm and pink.  No swelling.  Normal CMS.      Assessment/Plan: #1.  Doing well following emergent bypass graft.  On IV heparin and aspirin.      Will change to Xarelto for discharge eventually     #2.  Right mandibular mass suspicious for lymphoma.  Biopsy had been planned prior to graft failure.  Discussed with interventional radiology for core biopsy later today.  We will hold heparin for this procedure. Dr Dreyer of Oncology is following.      Wm. Blake MD
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
## 2023-10-10 NOTE — PLAN OF CARE
Orientations: 4  Vitals/Pain: VSS on 1 L NC overnight, pain managed with PRN oxy x1 and tylenol  Tele: acc. Junctional with prolonged QT and ST seggment abnormalities  Lines/Drains: 3L PICC to ALFONZO with Hep gtt going at 800 units/hr  Skin/Wounds: RLE incisions x3 (groin, shin and top of foot) WDL, pulses palpable CMS intact  GI/: + large BM, +BS, AUOP  Labs: Abnormal/Trends, Electrolyte Replacement- xa- 0.66 redraw in for 10/11 @ 0600 AM labs pending  Ambulation/Assist: x1 GB/W  Sleep Quality: fair  Plan: monitor CMS and coag studies

## 2023-10-10 NOTE — PROVIDER NOTIFICATION
"MD Notification    Notified Person: MD    Notified Person Name: Dr. SortoClair    Notification Date/Time: 10/10 at 1410     Notification Interaction: Vocera    Purpose of Notification: \"Pt just endorse to me that she is having some increased blurry vision. She is far sighted but says she is having trouble seeing close now too. VSS. No other neurological deficits. Please advise.\"    Orders Received: MD ordered Head CT.     Comments:   6812 -Pt's blurred vision resolved within an hour of intal MD notification. Writer contacted MD re: resolution.     1815 - Called by CT to bring patient for imaging. Pt w/o any abnormal neuro symptoms. Pt is requesting to hold off on the CT scan at this time. MD paged to determine if CT still needed.   "

## 2023-10-11 ENCOUNTER — ANESTHESIA (OUTPATIENT)
Dept: SURGERY | Facility: CLINIC | Age: 63
DRG: 270 | End: 2023-10-11
Payer: COMMERCIAL

## 2023-10-11 ENCOUNTER — APPOINTMENT (OUTPATIENT)
Dept: GENERAL RADIOLOGY | Facility: CLINIC | Age: 63
DRG: 270 | End: 2023-10-11
Attending: SURGERY
Payer: COMMERCIAL

## 2023-10-11 ENCOUNTER — ANESTHESIA EVENT (OUTPATIENT)
Dept: SURGERY | Facility: CLINIC | Age: 63
DRG: 270 | End: 2023-10-11
Payer: COMMERCIAL

## 2023-10-11 VITALS
SYSTOLIC BLOOD PRESSURE: 95 MMHG | HEART RATE: 55 BPM | DIASTOLIC BLOOD PRESSURE: 55 MMHG | RESPIRATION RATE: 10 BRPM | TEMPERATURE: 98.8 F

## 2023-10-11 LAB
ABO/RH(D): NORMAL
ANTIBODY SCREEN: NEGATIVE
BLD PROD TYP BPU: NORMAL
BLD PROD TYP BPU: NORMAL
BLOOD COMPONENT TYPE: NORMAL
BLOOD COMPONENT TYPE: NORMAL
CODING SYSTEM: NORMAL
CODING SYSTEM: NORMAL
CREAT SERPL-MCNC: 0.9 MG/DL (ref 0.51–0.95)
CROSSMATCH: NORMAL
CROSSMATCH: NORMAL
EGFRCR SERPLBLD CKD-EPI 2021: 71 ML/MIN/1.73M2
ERYTHROCYTE [DISTWIDTH] IN BLOOD BY AUTOMATED COUNT: 15.5 % (ref 10–15)
HCT VFR BLD AUTO: 21.7 % (ref 35–47)
HGB BLD-MCNC: 10.5 G/DL (ref 11.7–15.7)
HGB BLD-MCNC: 7.2 G/DL (ref 11.7–15.7)
ISSUE DATE AND TIME: NORMAL
ISSUE DATE AND TIME: NORMAL
MAGNESIUM SERPL-MCNC: 1.8 MG/DL (ref 1.7–2.3)
MCH RBC QN AUTO: 29.5 PG (ref 26.5–33)
MCHC RBC AUTO-ENTMCNC: 33.2 G/DL (ref 31.5–36.5)
MCV RBC AUTO: 89 FL (ref 78–100)
PATH REPORT.COMMENTS IMP SPEC: ABNORMAL
PATH REPORT.COMMENTS IMP SPEC: YES
PATH REPORT.FINAL DX SPEC: ABNORMAL
PATH REPORT.MICROSCOPIC SPEC OTHER STN: ABNORMAL
PATH REPORT.RELEVANT HX SPEC: ABNORMAL
PLATELET # BLD AUTO: 172 10E3/UL (ref 150–450)
POTASSIUM SERPL-SCNC: 4.1 MMOL/L (ref 3.4–5.3)
RBC # BLD AUTO: 2.44 10E6/UL (ref 3.8–5.2)
SPECIMEN EXPIRATION DATE: NORMAL
UFH PPP CHRO-ACNC: 1.09 IU/ML
UNIT ABO/RH: NORMAL
UNIT ABO/RH: NORMAL
UNIT NUMBER: NORMAL
UNIT NUMBER: NORMAL
UNIT STATUS: NORMAL
UNIT STATUS: NORMAL
UNIT TYPE ISBT: 6200
UNIT TYPE ISBT: 6200
WBC # BLD AUTO: 6.1 10E3/UL (ref 4–11)

## 2023-10-11 PROCEDURE — P9016 RBC LEUKOCYTES REDUCED: HCPCS | Performed by: STUDENT IN AN ORGANIZED HEALTH CARE EDUCATION/TRAINING PROGRAM

## 2023-10-11 PROCEDURE — 250N000013 HC RX MED GY IP 250 OP 250 PS 637: Performed by: STUDENT IN AN ORGANIZED HEALTH CARE EDUCATION/TRAINING PROGRAM

## 2023-10-11 PROCEDURE — 250N000011 HC RX IP 250 OP 636: Mod: JZ | Performed by: REGISTERED NURSE

## 2023-10-11 PROCEDURE — 258N000003 HC RX IP 258 OP 636: Performed by: SURGERY

## 2023-10-11 PROCEDURE — 85018 HEMOGLOBIN: CPT | Performed by: STUDENT IN AN ORGANIZED HEALTH CARE EDUCATION/TRAINING PROGRAM

## 2023-10-11 PROCEDURE — 82565 ASSAY OF CREATININE: CPT | Performed by: STUDENT IN AN ORGANIZED HEALTH CARE EDUCATION/TRAINING PROGRAM

## 2023-10-11 PROCEDURE — 88307 TISSUE EXAM BY PATHOLOGIST: CPT | Mod: 26 | Performed by: PATHOLOGY

## 2023-10-11 PROCEDURE — 710N000009 HC RECOVERY PHASE 1, LEVEL 1, PER MIN: Performed by: OTOLARYNGOLOGY

## 2023-10-11 PROCEDURE — 370N000017 HC ANESTHESIA TECHNICAL FEE, PER MIN: Performed by: OTOLARYNGOLOGY

## 2023-10-11 PROCEDURE — 250N000009 HC RX 250: Performed by: SURGERY

## 2023-10-11 PROCEDURE — 120N000013 HC R&B IMCU

## 2023-10-11 PROCEDURE — 250N000009 HC RX 250: Performed by: NURSE ANESTHETIST, CERTIFIED REGISTERED

## 2023-10-11 PROCEDURE — 36415 COLL VENOUS BLD VENIPUNCTURE: CPT | Performed by: SURGERY

## 2023-10-11 PROCEDURE — 272N000001 HC OR GENERAL SUPPLY STERILE: Performed by: SURGERY

## 2023-10-11 PROCEDURE — 258N000003 HC RX IP 258 OP 636: Performed by: ANESTHESIOLOGY

## 2023-10-11 PROCEDURE — 258N000003 HC RX IP 258 OP 636: Performed by: NURSE ANESTHETIST, CERTIFIED REGISTERED

## 2023-10-11 PROCEDURE — 258N000003 HC RX IP 258 OP 636: Performed by: STUDENT IN AN ORGANIZED HEALTH CARE EDUCATION/TRAINING PROGRAM

## 2023-10-11 PROCEDURE — 88342 IMHCHEM/IMCYTCHM 1ST ANTB: CPT | Mod: 26 | Performed by: PATHOLOGY

## 2023-10-11 PROCEDURE — 360N000075 HC SURGERY LEVEL 2, PER MIN: Performed by: OTOLARYNGOLOGY

## 2023-10-11 PROCEDURE — 250N000013 HC RX MED GY IP 250 OP 250 PS 637: Performed by: INTERNAL MEDICINE

## 2023-10-11 PROCEDURE — 272N000001 HC OR GENERAL SUPPLY STERILE: Performed by: OTOLARYNGOLOGY

## 2023-10-11 PROCEDURE — 04CP0ZZ EXTIRPATION OF MATTER FROM RIGHT ANTERIOR TIBIAL ARTERY, OPEN APPROACH: ICD-10-PCS | Performed by: SURGERY

## 2023-10-11 PROCEDURE — 85520 HEPARIN ASSAY: CPT | Performed by: SURGERY

## 2023-10-11 PROCEDURE — 36592 COLLECT BLOOD FROM PICC: CPT | Performed by: STUDENT IN AN ORGANIZED HEALTH CARE EDUCATION/TRAINING PROGRAM

## 2023-10-11 PROCEDURE — 250N000025 HC SEVOFLURANE, PER MIN: Performed by: SURGERY

## 2023-10-11 PROCEDURE — 250N000011 HC RX IP 250 OP 636: Performed by: SURGERY

## 2023-10-11 PROCEDURE — 86901 BLOOD TYPING SEROLOGIC RH(D): CPT | Performed by: STUDENT IN AN ORGANIZED HEALTH CARE EDUCATION/TRAINING PROGRAM

## 2023-10-11 PROCEDURE — 360N000075 HC SURGERY LEVEL 2, PER MIN: Performed by: SURGERY

## 2023-10-11 PROCEDURE — 250N000011 HC RX IP 250 OP 636: Performed by: NURSE ANESTHETIST, CERTIFIED REGISTERED

## 2023-10-11 PROCEDURE — 250N000009 HC RX 250: Performed by: OTOLARYNGOLOGY

## 2023-10-11 PROCEDURE — 999N000179 XR SURGERY CARM FLUORO LESS THAN 5 MIN W STILLS

## 2023-10-11 PROCEDURE — 99232 SBSQ HOSP IP/OBS MODERATE 35: CPT | Performed by: INTERNAL MEDICINE

## 2023-10-11 PROCEDURE — 88189 FLOWCYTOMETRY/READ 16 & >: CPT | Mod: GC | Performed by: STUDENT IN AN ORGANIZED HEALTH CARE EDUCATION/TRAINING PROGRAM

## 2023-10-11 PROCEDURE — 250N000025 HC SEVOFLURANE, PER MIN: Performed by: OTOLARYNGOLOGY

## 2023-10-11 PROCEDURE — 88185 FLOWCYTOMETRY/TC ADD-ON: CPT | Performed by: STUDENT IN AN ORGANIZED HEALTH CARE EDUCATION/TRAINING PROGRAM

## 2023-10-11 PROCEDURE — 250N000011 HC RX IP 250 OP 636: Performed by: HOSPITALIST

## 2023-10-11 PROCEDURE — 84132 ASSAY OF SERUM POTASSIUM: CPT | Performed by: SURGERY

## 2023-10-11 PROCEDURE — 88161 CYTOPATH SMEAR OTHER SOURCE: CPT | Mod: TC | Performed by: OTOLARYNGOLOGY

## 2023-10-11 PROCEDURE — C1757 CATH, THROMBECTOMY/EMBOLECT: HCPCS | Performed by: SURGERY

## 2023-10-11 PROCEDURE — B41F1ZZ FLUOROSCOPY OF RIGHT LOWER EXTREMITY ARTERIES USING LOW OSMOLAR CONTRAST: ICD-10-PCS | Performed by: SURGERY

## 2023-10-11 PROCEDURE — 86923 COMPATIBILITY TEST ELECTRIC: CPT | Performed by: STUDENT IN AN ORGANIZED HEALTH CARE EDUCATION/TRAINING PROGRAM

## 2023-10-11 PROCEDURE — 250N000011 HC RX IP 250 OP 636: Performed by: STUDENT IN AN ORGANIZED HEALTH CARE EDUCATION/TRAINING PROGRAM

## 2023-10-11 PROCEDURE — 250N000011 HC RX IP 250 OP 636: Performed by: OTOLARYNGOLOGY

## 2023-10-11 PROCEDURE — 83735 ASSAY OF MAGNESIUM: CPT | Performed by: SURGERY

## 2023-10-11 PROCEDURE — 999N000141 HC STATISTIC PRE-PROCEDURE NURSING ASSESSMENT: Performed by: OTOLARYNGOLOGY

## 2023-10-11 PROCEDURE — 04CK0ZZ EXTIRPATION OF MATTER FROM RIGHT FEMORAL ARTERY, OPEN APPROACH: ICD-10-PCS | Performed by: SURGERY

## 2023-10-11 PROCEDURE — 88341 IMHCHEM/IMCYTCHM EA ADD ANTB: CPT | Mod: 26 | Performed by: PATHOLOGY

## 2023-10-11 PROCEDURE — 85027 COMPLETE CBC AUTOMATED: CPT | Performed by: STUDENT IN AN ORGANIZED HEALTH CARE EDUCATION/TRAINING PROGRAM

## 2023-10-11 PROCEDURE — 250N000011 HC RX IP 250 OP 636: Performed by: ANESTHESIOLOGY

## 2023-10-11 PROCEDURE — 88360 TUMOR IMMUNOHISTOCHEM/MANUAL: CPT | Mod: 26 | Performed by: PATHOLOGY

## 2023-10-11 PROCEDURE — 370N000017 HC ANESTHESIA TECHNICAL FEE, PER MIN: Performed by: SURGERY

## 2023-10-11 PROCEDURE — 07B10ZZ EXCISION OF RIGHT NECK LYMPHATIC, OPEN APPROACH: ICD-10-PCS | Performed by: OTOLARYNGOLOGY

## 2023-10-11 PROCEDURE — 35875 REMOVAL OF CLOT IN GRAFT: CPT | Mod: 79 | Performed by: SURGERY

## 2023-10-11 PROCEDURE — 710N000009 HC RECOVERY PHASE 1, LEVEL 1, PER MIN: Performed by: SURGERY

## 2023-10-11 RX ORDER — SODIUM CHLORIDE, SODIUM LACTATE, POTASSIUM CHLORIDE, CALCIUM CHLORIDE 600; 310; 30; 20 MG/100ML; MG/100ML; MG/100ML; MG/100ML
INJECTION, SOLUTION INTRAVENOUS CONTINUOUS
Status: DISCONTINUED | OUTPATIENT
Start: 2023-10-11 | End: 2023-10-12

## 2023-10-11 RX ORDER — PROPOFOL 10 MG/ML
INJECTION, EMULSION INTRAVENOUS PRN
Status: DISCONTINUED | OUTPATIENT
Start: 2023-10-11 | End: 2023-10-11

## 2023-10-11 RX ORDER — EPHEDRINE SULFATE 50 MG/ML
INJECTION, SOLUTION INTRAMUSCULAR; INTRAVENOUS; SUBCUTANEOUS PRN
Status: DISCONTINUED | OUTPATIENT
Start: 2023-10-11 | End: 2023-10-11

## 2023-10-11 RX ORDER — SODIUM CHLORIDE, SODIUM LACTATE, POTASSIUM CHLORIDE, CALCIUM CHLORIDE 600; 310; 30; 20 MG/100ML; MG/100ML; MG/100ML; MG/100ML
INJECTION, SOLUTION INTRAVENOUS CONTINUOUS
Status: DISCONTINUED | OUTPATIENT
Start: 2023-10-11 | End: 2023-10-11 | Stop reason: HOSPADM

## 2023-10-11 RX ORDER — HYDROMORPHONE HCL IN WATER/PF 6 MG/30 ML
0.4 PATIENT CONTROLLED ANALGESIA SYRINGE INTRAVENOUS EVERY 5 MIN PRN
Status: DISCONTINUED | OUTPATIENT
Start: 2023-10-11 | End: 2023-10-11 | Stop reason: HOSPADM

## 2023-10-11 RX ORDER — LIDOCAINE HYDROCHLORIDE 20 MG/ML
INJECTION, SOLUTION INFILTRATION; PERINEURAL PRN
Status: DISCONTINUED | OUTPATIENT
Start: 2023-10-11 | End: 2023-10-11

## 2023-10-11 RX ORDER — DEXAMETHASONE SODIUM PHOSPHATE 4 MG/ML
INJECTION, SOLUTION INTRA-ARTICULAR; INTRALESIONAL; INTRAMUSCULAR; INTRAVENOUS; SOFT TISSUE PRN
Status: DISCONTINUED | OUTPATIENT
Start: 2023-10-11 | End: 2023-10-11

## 2023-10-11 RX ORDER — DEXAMETHASONE SODIUM PHOSPHATE 10 MG/ML
10 INJECTION, SOLUTION INTRAMUSCULAR; INTRAVENOUS ONCE
Status: DISCONTINUED | OUTPATIENT
Start: 2023-10-11 | End: 2023-10-11 | Stop reason: HOSPADM

## 2023-10-11 RX ORDER — HYDROMORPHONE HCL IN WATER/PF 6 MG/30 ML
0.2 PATIENT CONTROLLED ANALGESIA SYRINGE INTRAVENOUS EVERY 5 MIN PRN
Status: DISCONTINUED | OUTPATIENT
Start: 2023-10-11 | End: 2023-10-11 | Stop reason: HOSPADM

## 2023-10-11 RX ORDER — ONDANSETRON 2 MG/ML
INJECTION INTRAMUSCULAR; INTRAVENOUS PRN
Status: DISCONTINUED | OUTPATIENT
Start: 2023-10-11 | End: 2023-10-11

## 2023-10-11 RX ORDER — MAGNESIUM HYDROXIDE 1200 MG/15ML
LIQUID ORAL PRN
Status: DISCONTINUED | OUTPATIENT
Start: 2023-10-11 | End: 2023-10-11

## 2023-10-11 RX ORDER — FENTANYL CITRATE 0.05 MG/ML
50 INJECTION, SOLUTION INTRAMUSCULAR; INTRAVENOUS EVERY 5 MIN PRN
Status: DISCONTINUED | OUTPATIENT
Start: 2023-10-11 | End: 2023-10-11 | Stop reason: HOSPADM

## 2023-10-11 RX ORDER — ONDANSETRON 2 MG/ML
4 INJECTION INTRAMUSCULAR; INTRAVENOUS EVERY 30 MIN PRN
Status: DISCONTINUED | OUTPATIENT
Start: 2023-10-11 | End: 2023-10-11 | Stop reason: HOSPADM

## 2023-10-11 RX ORDER — ONDANSETRON 4 MG/1
4 TABLET, ORALLY DISINTEGRATING ORAL EVERY 30 MIN PRN
Status: DISCONTINUED | OUTPATIENT
Start: 2023-10-11 | End: 2023-10-11 | Stop reason: HOSPADM

## 2023-10-11 RX ORDER — CLINDAMYCIN PHOSPHATE 900 MG/50ML
900 INJECTION, SOLUTION INTRAVENOUS
Status: DISCONTINUED | OUTPATIENT
Start: 2023-10-11 | End: 2023-10-11 | Stop reason: HOSPADM

## 2023-10-11 RX ORDER — FENTANYL CITRATE 0.05 MG/ML
25 INJECTION, SOLUTION INTRAMUSCULAR; INTRAVENOUS EVERY 5 MIN PRN
Status: DISCONTINUED | OUTPATIENT
Start: 2023-10-11 | End: 2023-10-11 | Stop reason: HOSPADM

## 2023-10-11 RX ORDER — VASOPRESSIN IN 0.9 % NACL 2 UNIT/2ML
SYRINGE (ML) INTRAVENOUS PRN
Status: DISCONTINUED | OUTPATIENT
Start: 2023-10-11 | End: 2023-10-11

## 2023-10-11 RX ORDER — CLINDAMYCIN PHOSPHATE 900 MG/50ML
900 INJECTION, SOLUTION INTRAVENOUS SEE ADMIN INSTRUCTIONS
Status: DISCONTINUED | OUTPATIENT
Start: 2023-10-11 | End: 2023-10-11 | Stop reason: HOSPADM

## 2023-10-11 RX ORDER — PROPOFOL 10 MG/ML
INJECTION, EMULSION INTRAVENOUS CONTINUOUS PRN
Status: DISCONTINUED | OUTPATIENT
Start: 2023-10-11 | End: 2023-10-11

## 2023-10-11 RX ORDER — SODIUM CHLORIDE, SODIUM LACTATE, POTASSIUM CHLORIDE, CALCIUM CHLORIDE 600; 310; 30; 20 MG/100ML; MG/100ML; MG/100ML; MG/100ML
INJECTION, SOLUTION INTRAVENOUS CONTINUOUS PRN
Status: DISCONTINUED | OUTPATIENT
Start: 2023-10-11 | End: 2023-10-11

## 2023-10-11 RX ORDER — BUPIVACAINE HYDROCHLORIDE 5 MG/ML
INJECTION, SOLUTION PERINEURAL PRN
Status: DISCONTINUED | OUTPATIENT
Start: 2023-10-11 | End: 2023-10-11

## 2023-10-11 RX ORDER — HEPARIN SODIUM 1000 [USP'U]/ML
INJECTION, SOLUTION INTRAVENOUS; SUBCUTANEOUS PRN
Status: DISCONTINUED | OUTPATIENT
Start: 2023-10-11 | End: 2023-10-11

## 2023-10-11 RX ORDER — LIDOCAINE 40 MG/G
CREAM TOPICAL
Status: DISCONTINUED | OUTPATIENT
Start: 2023-10-11 | End: 2023-10-14 | Stop reason: HOSPADM

## 2023-10-11 RX ORDER — LIDOCAINE 40 MG/G
CREAM TOPICAL
Status: DISCONTINUED | OUTPATIENT
Start: 2023-10-11 | End: 2023-10-11

## 2023-10-11 RX ORDER — CLINDAMYCIN PHOSPHATE 900 MG/50ML
900 INJECTION, SOLUTION INTRAVENOUS EVERY 8 HOURS
Status: COMPLETED | OUTPATIENT
Start: 2023-10-11 | End: 2023-10-12

## 2023-10-11 RX ORDER — CLINDAMYCIN PHOSPHATE 900 MG/50ML
INJECTION, SOLUTION INTRAVENOUS PRN
Status: DISCONTINUED | OUTPATIENT
Start: 2023-10-11 | End: 2023-10-11

## 2023-10-11 RX ORDER — DIPHENHYDRAMINE HYDROCHLORIDE 50 MG/ML
50 INJECTION INTRAMUSCULAR; INTRAVENOUS ONCE
Status: COMPLETED | OUTPATIENT
Start: 2023-10-11 | End: 2023-10-11

## 2023-10-11 RX ORDER — LIDOCAINE HYDROCHLORIDE AND EPINEPHRINE 10; 10 MG/ML; UG/ML
INJECTION, SOLUTION INFILTRATION; PERINEURAL PRN
Status: DISCONTINUED | OUTPATIENT
Start: 2023-10-11 | End: 2023-10-11 | Stop reason: HOSPADM

## 2023-10-11 RX ORDER — METHYLPREDNISOLONE SODIUM SUCCINATE 40 MG/ML
40 INJECTION, POWDER, LYOPHILIZED, FOR SOLUTION INTRAMUSCULAR; INTRAVENOUS EVERY 4 HOURS
Status: DISCONTINUED | OUTPATIENT
Start: 2023-10-11 | End: 2023-10-11

## 2023-10-11 RX ORDER — FENTANYL CITRATE 50 UG/ML
INJECTION, SOLUTION INTRAMUSCULAR; INTRAVENOUS PRN
Status: DISCONTINUED | OUTPATIENT
Start: 2023-10-11 | End: 2023-10-11

## 2023-10-11 RX ORDER — GINSENG 100 MG
CAPSULE ORAL
Status: DISCONTINUED
Start: 2023-10-11 | End: 2023-10-11 | Stop reason: HOSPADM

## 2023-10-11 RX ADMIN — CARVEDILOL 3.12 MG: 3.12 TABLET, FILM COATED ORAL at 06:20

## 2023-10-11 RX ADMIN — FENTANYL CITRATE 50 MCG: 50 INJECTION, SOLUTION INTRAMUSCULAR; INTRAVENOUS at 09:02

## 2023-10-11 RX ADMIN — LIDOCAINE HYDROCHLORIDE 40 MG: 20 INJECTION, SOLUTION INFILTRATION; PERINEURAL at 10:05

## 2023-10-11 RX ADMIN — DOCUSATE SODIUM 100 MG: 100 CAPSULE, LIQUID FILLED ORAL at 22:07

## 2023-10-11 RX ADMIN — FENTANYL CITRATE 50 MCG: 50 INJECTION INTRAMUSCULAR; INTRAVENOUS at 07:42

## 2023-10-11 RX ADMIN — Medication 10 MG: at 10:43

## 2023-10-11 RX ADMIN — HEPARIN SODIUM 5000 UNITS: 1000 INJECTION INTRAVENOUS; SUBCUTANEOUS at 09:21

## 2023-10-11 RX ADMIN — ROCURONIUM BROMIDE 50 MG: 50 INJECTION, SOLUTION INTRAVENOUS at 10:06

## 2023-10-11 RX ADMIN — ONDANSETRON 4 MG: 2 INJECTION INTRAMUSCULAR; INTRAVENOUS at 07:59

## 2023-10-11 RX ADMIN — CLINDAMYCIN PHOSPHATE 900 MG: 900 INJECTION, SOLUTION INTRAVENOUS at 07:21

## 2023-10-11 RX ADMIN — LIDOCAINE HYDROCHLORIDE 100 MG: 20 INJECTION, SOLUTION INFILTRATION; PERINEURAL at 07:42

## 2023-10-11 RX ADMIN — ONDANSETRON 4 MG: 2 INJECTION INTRAMUSCULAR; INTRAVENOUS at 11:23

## 2023-10-11 RX ADMIN — ROCURONIUM BROMIDE 30 MG: 50 INJECTION, SOLUTION INTRAVENOUS at 07:42

## 2023-10-11 RX ADMIN — CLINDAMYCIN PHOSPHATE 900 MG: 900 INJECTION, SOLUTION INTRAVENOUS at 10:43

## 2023-10-11 RX ADMIN — PHENYLEPHRINE HYDROCHLORIDE 100 MCG: 10 INJECTION INTRAVENOUS at 08:17

## 2023-10-11 RX ADMIN — SENNOSIDES AND DOCUSATE SODIUM 1 TABLET: 50; 8.6 TABLET ORAL at 22:06

## 2023-10-11 RX ADMIN — MIDAZOLAM 1 MG: 1 INJECTION INTRAMUSCULAR; INTRAVENOUS at 07:37

## 2023-10-11 RX ADMIN — Medication 1 UNITS: at 08:25

## 2023-10-11 RX ADMIN — METHYLPREDNISOLONE 40 MG: 40 INJECTION, POWDER, LYOPHILIZED, FOR SOLUTION INTRAMUSCULAR; INTRAVENOUS at 09:56

## 2023-10-11 RX ADMIN — CLINDAMYCIN PHOSPHATE 900 MG: 900 INJECTION, SOLUTION INTRAVENOUS at 18:04

## 2023-10-11 RX ADMIN — GABAPENTIN 100 MG: 100 CAPSULE ORAL at 14:24

## 2023-10-11 RX ADMIN — PHENYLEPHRINE HYDROCHLORIDE 100 MCG: 10 INJECTION INTRAVENOUS at 07:42

## 2023-10-11 RX ADMIN — GABAPENTIN 100 MG: 100 CAPSULE ORAL at 22:07

## 2023-10-11 RX ADMIN — SODIUM CHLORIDE, POTASSIUM CHLORIDE, SODIUM LACTATE AND CALCIUM CHLORIDE: 600; 310; 30; 20 INJECTION, SOLUTION INTRAVENOUS at 07:37

## 2023-10-11 RX ADMIN — PHENYLEPHRINE HYDROCHLORIDE 200 MCG: 10 INJECTION INTRAVENOUS at 07:56

## 2023-10-11 RX ADMIN — DIPHENHYDRAMINE HYDROCHLORIDE 50 MG: 50 INJECTION, SOLUTION INTRAMUSCULAR; INTRAVENOUS at 09:51

## 2023-10-11 RX ADMIN — PHENYLEPHRINE HYDROCHLORIDE 100 MCG: 10 INJECTION INTRAVENOUS at 07:46

## 2023-10-11 RX ADMIN — MIDAZOLAM 1 MG: 1 INJECTION INTRAMUSCULAR; INTRAVENOUS at 09:45

## 2023-10-11 RX ADMIN — CARVEDILOL 3.12 MG: 3.12 TABLET, FILM COATED ORAL at 18:03

## 2023-10-11 RX ADMIN — PHENYLEPHRINE HYDROCHLORIDE 100 MCG: 10 INJECTION INTRAVENOUS at 10:09

## 2023-10-11 RX ADMIN — PHENYLEPHRINE HYDROCHLORIDE 0.5 MCG/KG/MIN: 10 INJECTION INTRAVENOUS at 07:42

## 2023-10-11 RX ADMIN — SODIUM CHLORIDE, POTASSIUM CHLORIDE, SODIUM LACTATE AND CALCIUM CHLORIDE: 600; 310; 30; 20 INJECTION, SOLUTION INTRAVENOUS at 10:01

## 2023-10-11 RX ADMIN — HYDROMORPHONE HYDROCHLORIDE 0.4 MG: 0.2 INJECTION, SOLUTION INTRAMUSCULAR; INTRAVENOUS; SUBCUTANEOUS at 09:09

## 2023-10-11 RX ADMIN — PROPOFOL 50 MCG/KG/MIN: 10 INJECTION, EMULSION INTRAVENOUS at 07:42

## 2023-10-11 RX ADMIN — SUGAMMADEX 100 MG: 100 INJECTION, SOLUTION INTRAVENOUS at 08:31

## 2023-10-11 RX ADMIN — NICOTINE 1 PATCH: 14 PATCH, EXTENDED RELEASE TRANSDERMAL at 14:24

## 2023-10-11 RX ADMIN — PROPOFOL 70 MG: 10 INJECTION, EMULSION INTRAVENOUS at 07:42

## 2023-10-11 RX ADMIN — HEPARIN SODIUM 800 UNITS/HR: 10000 INJECTION, SOLUTION INTRAVENOUS at 09:42

## 2023-10-11 RX ADMIN — FENTANYL CITRATE 100 MCG: 50 INJECTION INTRAMUSCULAR; INTRAVENOUS at 10:06

## 2023-10-11 RX ADMIN — PROPOFOL 120 MG: 10 INJECTION, EMULSION INTRAVENOUS at 10:05

## 2023-10-11 RX ADMIN — PHENYLEPHRINE HYDROCHLORIDE 200 MCG: 10 INJECTION INTRAVENOUS at 08:02

## 2023-10-11 RX ADMIN — FERROUS SULFATE TAB 325 MG (65 MG ELEMENTAL FE) 325 MG: 325 (65 FE) TAB at 22:06

## 2023-10-11 RX ADMIN — PHENYLEPHRINE HYDROCHLORIDE 100 MCG: 10 INJECTION INTRAVENOUS at 08:08

## 2023-10-11 RX ADMIN — ACETAMINOPHEN 650 MG: 325 TABLET, FILM COATED ORAL at 22:07

## 2023-10-11 RX ADMIN — PHENYLEPHRINE HYDROCHLORIDE 0.8 MCG/KG/MIN: 10 INJECTION INTRAVENOUS at 10:16

## 2023-10-11 RX ADMIN — ROSUVASTATIN CALCIUM 40 MG: 20 TABLET, FILM COATED ORAL at 22:06

## 2023-10-11 RX ADMIN — SODIUM CHLORIDE, POTASSIUM CHLORIDE, SODIUM LACTATE AND CALCIUM CHLORIDE: 600; 310; 30; 20 INJECTION, SOLUTION INTRAVENOUS at 07:20

## 2023-10-11 RX ADMIN — DEXAMETHASONE SODIUM PHOSPHATE 10 MG: 4 INJECTION, SOLUTION INTRA-ARTICULAR; INTRALESIONAL; INTRAMUSCULAR; INTRAVENOUS; SOFT TISSUE at 07:59

## 2023-10-11 RX ADMIN — Medication 1 TABLET: at 18:03

## 2023-10-11 RX ADMIN — HYDROMORPHONE HYDROCHLORIDE 0.2 MG: 0.2 INJECTION, SOLUTION INTRAMUSCULAR; INTRAVENOUS; SUBCUTANEOUS at 09:30

## 2023-10-11 RX ADMIN — SODIUM CHLORIDE, POTASSIUM CHLORIDE, SODIUM LACTATE AND CALCIUM CHLORIDE: 600; 310; 30; 20 INJECTION, SOLUTION INTRAVENOUS at 14:25

## 2023-10-11 RX ADMIN — MIDAZOLAM 1 MG: 1 INJECTION INTRAMUSCULAR; INTRAVENOUS at 09:17

## 2023-10-11 RX ADMIN — PHENYLEPHRINE HYDROCHLORIDE 100 MCG: 10 INJECTION INTRAVENOUS at 10:34

## 2023-10-11 RX ADMIN — SUGAMMADEX 100 MG: 100 INJECTION, SOLUTION INTRAVENOUS at 11:42

## 2023-10-11 ASSESSMENT — ACTIVITIES OF DAILY LIVING (ADL)
ADLS_ACUITY_SCORE: 25

## 2023-10-11 ASSESSMENT — LIFESTYLE VARIABLES
TOBACCO_USE: 1
TOBACCO_USE: 1

## 2023-10-11 ASSESSMENT — COPD QUESTIONNAIRES
COPD: 1
COPD: 1

## 2023-10-11 NOTE — PROGRESS NOTES
VASCULAR SURGERY: Postop check    Back in [facility] [place].  Very comfortable.  No foot pain and normal CMS  Excellent biphasic Doppler signal in right distal AT and distal DP  Foot is warm and pink.  Groin and foot dressings dry.  Small amount of strikethrough on Incision of no clinical concern.    Postop hemoglobin after 2 unit packed red blood cells= pending      Appropriate.  Talkative.  Very comfortable.        IMPRESSION: #1.  Successful thrombectomy right femoral to anterior tibial bypass graft with good runoff into the DP.  We will keep on full dose heparin and aspirin.  We will eventually transition over to Xarelto/aspirin chronically     #2.  CT had been ordered due to apparent some blurry vision.  No clinical need for this CT scan and this has been canceled    Chadwick Lozano MD

## 2023-10-11 NOTE — PLAN OF CARE
Goal Outcome Evaluation:      Plan of Care Reviewed With: patient    Overall Patient Progress: improvingOverall Patient Progress: improving    Shift: 7p - 7a  Surgery/POD#: Pt first admitted for NSTEMI. Now POD 4 from a thrombectomy, bypass w/ graft of RLE.  Orientation: A&O x4  ABNL VS/O2: VSS, 1L at night, RA during the day  Tele: NSR w/ prolonged QT  ABNL Labs: K and Mg replacement protocols, recheck this AM; Hgb 7.2   Pain Management: oxy  Bowel/Bladder: voiding in BR, bowel sounds normoactive, passing flatus, last BM 10/10  Lines: triple lumen PICC  Skin: x3 RLE incisions, dressings CDI, rash on back  Diet: Regular, NPO since midnight  Activity Level: x1 gb, walker  Tests/Procedures: neck biopsy this AM @ 0730  Anticipated  DC Date: pending  Significant Information: hep gtt stopped at 0130 in preparation for biopsy

## 2023-10-11 NOTE — ANESTHESIA PROCEDURE NOTES
Airway       Patient location during procedure: OR       Procedure Start/Stop Times: 10/11/2023 7:44 AM  Staff -        CRNA: Jabari Culp APRN CRNA       Performed By: CRNA  Consent for Airway        Urgency: elective  Indications and Patient Condition       Indications for airway management: lydia-procedural       Induction type:intravenous       Mask difficulty assessment: 1 - vent by mask    Final Airway Details       Final airway type: endotracheal airway       Successful airway: ETT - single, Oral and ROYAL  Endotracheal Airway Details        ETT size (mm): 7.0       Cuffed: yes       Successful intubation technique: video laryngoscopy       VL Blade Size: John 3       Grade View of Cords: 1       Adjucts: stylet       Position: Left       Bite block used: None    Post intubation assessment        Placement verified by: capnometry, equal breath sounds and chest rise        Number of attempts at approach: 1       Secured with: silk tape       Ease of procedure: easy       Dentition: Intact and Unchanged    Medication(s) Administered   Medication Administration Time: 10/11/2023 7:44 AM

## 2023-10-11 NOTE — PROGRESS NOTES
Dr Villagomez notified patient family - Celestina per patient request.  She will update the rest of the family

## 2023-10-11 NOTE — OP NOTE
OPERATIVE NOTE:  VASCULAR SURGERY    PROCEDURE DATE: 10/11/2023      PRE-OP DIAGNOSIS: Acute thrombosis right femoral to anterior tibial PTFE bypass graft with  severe limb ischemia      POST-OP DIAGNOSES: Same      PROCEDURE PERFORMED: #1.  Emergency right femoral to anterior tibial PTFE graft thrombectomy  #2.  Anterior tibial thrombectomy  #3.  Intraoperative angiography  (D-10)      SURGEON:  Chadwick Lozano M.D.      ASSISTANT: Yamil Villagomez MD  (Vascular Fellow)      ANESTHESIA:  General-endotracheal      PRE-OP MEDICATIONS: Cleocin 900 mg IV, Solu-Medrol 125 mg IV      INDICATIONS FOR PROCEDURE: Patient with severe vascular disease had undergone emergent right femoral to anterior tibial PTFE bypass graft 3 days ago.  We reestablish good blood flow via the anterior tibial and dorsalis pedis artery.  She has been on intravenous heparin.  She has a suspicious right neck lymph node that needed biopsying.  We had held her heparin from earlier this morning for the procedure.  This was performed under general anesthetic and she did have an episode of hypotension.  Graft was widely patent and preinduction but in PACU she is complaining of severe right foot pain.  By Doppler and duplex graft and anterior tibial artery had no flow.  She was given 5000 units intravenous heparin and taken emergently to the operating room for graft thrombectomy.      DESCRIPTION OF PROCEDURE: Brought to the operating room from PACU.  Induced under general anesthesia and orally intubated with no difficulty.  Dressings removed from the right groin/calf/foot.  Right leg was prepped and draped.  Alvarez catheter was not used.    Vascular exposure: IV heparin drip was continued had been started in preinduction after the IV heparin bolus.  We removed the dorsalis pedis nylon sutures in the anterior tibial calf sutures.  There was no thrombus around the dorsalis pedis artery but there was no pulse or Doppler signal as expected.  We opened the  anterior tibial incision.  There was a small amount of old thrombus noted but not occlusive or causing any pressure on the graft.  This was irrigated and the vessel loop was placed around the outflow anterior tibial artery.    Graft thrombectomy: 11 blade was used to make an opening longitudinally in the hanson of the PTFE graft.  We identified freshly organized clot.  We pulled this out of the hole at an outflow tract.  #2 Emily was passed distally down beyond the exposed dorsalis pedis artery and withdrawn twice with no clot or backbleeding.  Irrigated with heparinized saline solution.  We then passed a #4 Emily multiple times up the PTFE graft removing somewhat organized thrombus within flow being noted.  Heparinized saline solution was injected.    Intraoperative angiography; we then performed a retrograde angiogram with half-strength contrast.  PTFE graft is widely patent however it very proximal area in the groin there was a retained small amount of thrombus occupying less than 1/5 of the luminal diameter that could not be removed with fluoroscopic passage of the Emily catheter.  This was marked on the skin.  We then performed an outflow angiogram via the anterior tibial artery which was widely patent.    Proximal graft thrombectomy: We opened up the groin incision.  There was no hematoma or any extrinsic pressure on the PTFE graft.  This had been anastomosed end and to the original remnant of the cadaveric SFA old graft but still had a strong pulse.  Vascular clamp was applied to this and we made a transverse graftotomy on the PTFE.  We did identify a small amount of organized thrombus adhered to the wall of the graft that was removed.  We flushed this multiple times both proximally and distally from the PTFE graft with clear return being noted.    The proximal graft opening was closed with running 6-0 Prolene suture.  The distal graft opening was closed in a similar fashion with running 6-0 Prolene.   With the use of the vascular clamps and Vesseloops with a strong pulse within the graft and anterior tibial artery.  Excellent Doppler signals were appreciated within the anterior tibial artery and dorsalis pedis artery in the foot.  Foot pinked up very nicely with good reperfusion.    Small amount of Nu-Knit was placed over the graftotomy sites but able removed with excellent hemostasis.  Groin incision had debridement of the skin edges down to healthy bleeding tissue to aid in healing.  This was closed in layers with interrupted 3-0 Vicryl.  Skin was closed with 4-0 Monocryl in subcuticular fashion followed by surgical adhesive and Tegaderm.  The anterior tibial and dorsal foot incisions were closed with interrupted mattress 3-0 nylon sutures.  Wounds were infiltrated with 0.5% Marcaine.  Gauze and Vidhi were applied to the foot and calf.    Patient was left on intravenous heparin.          EBL: 250 mL.  Patient acute blood loss anemia with preprocedure hemoglobin 7.8.  With her underlying cardiac and vascular issues we gave her 2 units of packed red blood cells intraoperatively.      COMPLICATIONS: Thrombosed graft.  With a small thrombus defect in the proximal graft this may have been the inciting etiology with the patient's not on anticoagulation for her lymph node biopsy and hypotensive episode.  Will be kept on intravenous heparin and transition to Xarelto.    Operative procedure was more difficult due to the multiple graft openings necessary for complete thrombectomy and reestablishment of flow to her foot.        Chadwick Lozano MD

## 2023-10-11 NOTE — CONSULTS
Proceeded with periparotid/deep neck lymph node excision for lymphoma. Please call with any questions or concerns. Patient will follow with medical oncology. Recommend follow-up with me in January 2024. Please call with any questions or concerns.
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
## 2023-10-11 NOTE — PROGRESS NOTES
Chart Check:    Pt off the floor for LN biopsy.  We will follow up tomorrow.      David DRAKE, [facility]  [phone] (office) or [phone] (cell)

## 2023-10-11 NOTE — ANESTHESIA POSTPROCEDURE EVALUATION
Patient: Shirley Hendricks    Procedure: Procedure(s):  RIGHT FEMORAL TO POPLITEAL GRAFT THROMBECTOMY  Or Angiogram, Lower Extremity       Anesthesia Type:  General    Note:  Disposition: Inpatient   Postop Pain Control: Uneventful            Sign Out: Well controlled pain   PONV: No   Neuro/Psych: Uneventful            Sign Out: Acceptable/Baseline neuro status   Airway/Respiratory: Uneventful            Sign Out: Acceptable/Baseline resp. status   CV/Hemodynamics: Uneventful            Sign Out: Acceptable CV status   Other NRE: NONE   DID A NON-ROUTINE EVENT OCCUR?            Last vitals:  Vitals Value Taken Time   /64 10/11/23 1245   Temp 36.7  C (98  F) 10/11/23 1245   Pulse 60 10/11/23 1246   Resp 92 10/11/23 1246   SpO2 100 % 10/11/23 1247   Vitals shown include unfiled device data.    Electronically Signed By: Case Lentz MD  October 11, 2023  1:16 PM

## 2023-10-11 NOTE — ANESTHESIA POSTPROCEDURE EVALUATION
Patient: Shirley Hendricks    Procedure: Procedure(s):  Right Parotid Biopsy       Anesthesia Type:  General    Note:  Disposition: Inpatient   Postop Pain Control: Uneventful            Sign Out: Well controlled pain   PONV: No   Neuro/Psych: Uneventful            Sign Out: Acceptable/Baseline neuro status   Airway/Respiratory: Uneventful            Sign Out: Acceptable/Baseline resp. status   CV/Hemodynamics: Uneventful            Sign Out: pulseless right lower extremity with extensive history of PVD and multiple vascular surgeries; vascular surgery team consulted immediately.   Other NRE: NONE   DID A NON-ROUTINE EVENT OCCUR? No    Event details/Postop Comments:  Pulseless right lower extremity in PACU found by nursing. Vascular surgery immediately notified and plan made to return to OR for possible thrombectomy. Patient otherwise doing well in PACU aside from anxiety about having another vascular procedure.           Last vitals:  Vitals Value Taken Time   /114 10/11/23 0950   Temp 35.7  C (96.3  F) 10/11/23 0940   Pulse 89 10/11/23 0954   Resp 22 10/11/23 0954   SpO2 100 % 10/11/23 0954   Vitals shown include unfiled device data.    Electronically Signed By: Vasquez Parnell MD  October 11, 2023  9:55 AM

## 2023-10-11 NOTE — PLAN OF CARE
Shift Summary (0700-1900):     [facility] status. POD 0 RLE thrombectomy.   A&Ox4. RLE incisions x3. Pulses palpable and CMS intact. Denies pain. VSS on RA (wears 1 L while sleeping). R PICC infusing LR @ 75 ml/hr and heparin @ 600 units/hr. Up w/ Ax1 GBW. Tele shows SR/SB. Dressing to R neck from biopsy today CDI. K and Mg protocol WDL.

## 2023-10-11 NOTE — PROVIDER NOTIFICATION
Dr. Lozano at bedside - patient with significant pain to right foot with some numbness.  Unable to doppler DP / PT pulses

## 2023-10-11 NOTE — ANESTHESIA CARE TRANSFER NOTE
Patient: Shirley Hendricks    Procedure: Procedure(s):  Right Parotid Biopsy       Diagnosis: Parotid mass [K11.8]  Diagnosis Additional Information: No value filed.    Anesthesia Type:   General     Note:    Oropharynx: oropharynx clear of all foreign objects  Level of Consciousness: awake  Oxygen Supplementation: face mask  Level of Supplemental Oxygen (L/min / FiO2): 6  Independent Airway: airway patency satisfactory and stable  Dentition: dentition unchanged  Vital Signs Stable: post-procedure vital signs reviewed and stable  Report to RN Given: handoff report given  Patient transferred to: PACU    Handoff Report: Identifed the Patient, Identified the Reponsible Provider, Reviewed the pertinent medical history, Discussed the surgical course, Reviewed Intra-OP anesthesia mangement and issues during anesthesia, Set expectations for post-procedure period and Allowed opportunity for questions and acknowledgement of understanding      Electronically Signed By: NOELLE Hart CRNA  October 11, 2023  8:42 AM

## 2023-10-11 NOTE — ANESTHESIA PROCEDURE NOTES
Airway       Patient location during procedure: OR       Procedure Start/Stop Times: 10/11/2023 10:08 AM  Staff -        Anesthesiologist:  Case Lentz MD       CRNA: Tamica Mcfadden APRN CRNA       Performed By: CRNA  Consent for Airway        Urgency: elective  Indications and Patient Condition       Indications for airway management: lydia-procedural       Induction type:intravenous       Mask difficulty assessment: 1 - vent by mask    Final Airway Details       Final airway type: endotracheal airway       Successful airway: ETT - single  Endotracheal Airway Details        ETT size (mm): 7.0       Cuffed: yes       Successful intubation technique: video laryngoscopy       VL Blade Size: John 3       Grade View of Cords: 1       Adjucts: stylet       Position: Right       Measured from: lips       Secured at (cm): 20       Bite block used: None    Post intubation assessment        Placement verified by: capnometry, equal breath sounds and chest rise        Number of attempts at approach: 1       Secured with: silk tape       Ease of procedure: easy       Dentition: Intact and Unchanged    Medication(s) Administered   Medication Administration Time: 10/11/2023 10:08 AM

## 2023-10-11 NOTE — OP NOTE
Preoperative diagnosis: lymphoma right periparotid/deep neck lymph nodes   Postoperative diagnosis: same.    Procedure: excision deep neck lymph node, right   Surgeon: Randal Mendoza   Anesthesia: general   Estimated blood loss: is less than 1 mL   Specimen:  right periparotid/deep neck lymph node.    Operative complications: none.    Operative findings:  Enlarged lymph node consistent with lymphoma right deep neck space.     Operative indications: this is a 62-year-old female with history of an enlarging right neck mass adjacent to the parotid who was initially felt to have a parotid neoplasm.  Fine-needle aspiration biopsy was performed which demonstrated findings consistent with lymphoma.  In order to further characterize the lymphoma and proceed with treatment decisions a an open biopsy was required.      Operative procedure: the patient was met in preinduction where risks, benefits and limitations of surgery including risks to the facial nerve in particular and risks of bleeding and infection were discussed in detail and her pertinent questions were answered.  Consent was obtained.  Patient was taken to the operating room, placed in supine position, general anesthetic initiated the patient was endotracheally intubated and appropriate ventilation was established.  Patient was prepped and draped for the above procedure and 1% lidocaine with 1-100,000 epinephrine was infiltrated to the planned operative site.  An incision was made in the upper posterior neck at the posterior aspect of the angle of the mandible and sub-platysmal flaps were raised allowing access to the lymph node in question.  Sharp dissection was performed through the skin only and blunt dissection was performed beyond this.  The platysma was incised allowing access to the deep space and lymph nodes were identified.  These were excised using combination of electrocautery and blunt dissection.  Specimen was excised in its entirety and submitted  fresh for lymphoma work-up.  The site was then inspected and good hemostasis was noted.  It was irrigated.  Two layer closure was then performed first of the platysma and subcutaneous soft tissues using 4-0 clear PDS suture followed by 5-0 fast absorbable gut suture.  Bacitracin and an island dressing was then placed and the procedure concluded.  There were no intraoperative complications.  Patient was returned stable to the care of the anesthesia service.

## 2023-10-11 NOTE — ANESTHESIA PREPROCEDURE EVALUATION
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 clot    Post-menopausal     Reflux esophagitis 2004    EGD: esophagitis and medium HH    SBO (small bowel obstruction) (H) 08/10/2021    SVT (supraventricular tachycardia)     Thrombocytopenia (H24)     Uncomplicated asthma     Vitamin C deficiency 2018      Past Surgical History:   Procedure Laterality Date    ANGIOGRAM      ANGIOGRAM Right 2021    Procedure: RIGHT LOWER EXTREMITY ANGIOGRAM WITH LEFT BRACHIAL ARTERY CUTDOWN;  Surgeon: José Luis Hernandez MD;  Location:  OR    BYPASS GRAFT FEMOROPOPLITEAL Right 2020    Procedure: RIGHT FEMORAL GRAFT TO ABOVE-KNEE POPLITEAL BYPASS USING CADAVERIC SUPERFICIAL FEMORAL ARTERY;  Surgeon: Chadwick Lozano MD;  Location:  OR    BYPASS GRAFT FEMOROPOPLITEAL Right 2/15/2022    Procedure: RIGHT SUPERFICIAL FEMORAL ARTERY GRAFT TO BELOW KNEE POPLITEAL BYPASS WITH CADAVERIC CRYOLIFE  FEMORAL-POPLITEAL ARTERY SIZE: OUTER DIAMETER: 7MM - 6MM;  Surgeon: Chadwick Lozano  Dawson;  Location:  OR    BYPASS GRAFT FEMOROPOPLITEAL Right 5/26/2023    Procedure: RIGHT DISTAL SUPERFICIAL FEMORAL GRAFT TO ANTERIOR TIBIAL ARTERY WITH 26 CENTIMETER CADAVERIC SUPERFICIAL FEMORAL ARTERY GRAFT;  Surgeon: Chadwick Lozano MD;  Location:  OR    BYPASS GRAFT INSITU FEMOROPOPLITEAL Right 7/7/2021    Procedure: CREATION RIGHT FEMORAL TO POPLITEAL ARTERIAL BYPASS USING INSITU VEIN GRAFT;  Surgeon: José Luis Hernandez MD;  Location:  OR    CARDIAC CATHERIZATION  09/03/2009    multivessel CAD without target lesions, med mgmt indicated, preserved ef    CARPAL TUNNEL RELEASE RT/LT Right 05/20/2021    CV CORONARY ANGIOGRAM N/A 10/4/2023    Procedure: Coronary Angiogram;  Surgeon: Rogelio Ricks MD;  Location:  [facility]    CV PCI N/A 10/4/2023    Procedure: Percutaneous Coronary Intervention;  Surgeon: Rogelio Ricks MD;  Location:  [facility]    EMBOLECTOMY LOWER EXTREMITY Right 10/6/2023    Procedure: Right anterior tibial bypass with graft, Right tibial endarterectomy,thrombectomy, Right doraslis pedis thrombectomy, Anterior Tibial vein patch.;  Surgeon: Chadwick Lozano MD;  Location:  OR    ENDARTERECTOMY CAROTID Right 05/11/2016    Procedure: ENDARTERECTOMY CAROTID;  Surgeon: Chadwick Lozano MD;  Location:  OR    ENDARTERECTOMY CAROTID Left 06/08/2020    Procedure: LEFT CAROTID ENDARTERECTOMY with distal facal vein patch  and EEG;  Surgeon: Chadwick Lozano MD;  Location:  OR    FINE NEEDLE ASPIRATION WITHOUT IMAGING GUIDANCE Right 9/22/2023    Procedure: Fine needle aspiration without imaging guidance;  Surgeon: Kiersten Aguilera MD;  Location: [facility]    FLUORESCENCE INTRAOPERATIVE VASCULAR ANGIOGRAPHY Right 12/28/2022    Procedure: RIGHT LEG ANGIOGRAM with intervention;  Surgeon: Chadwick Lozano MD;  Location:  OR    GYN SURGERY  left tube    left salpingectomy    IR ANGIOGRAM THROUGH CATHETER  (ARTERIAL)  10/6/2023    IR ANGIOGRAM THROUGH CATHETER (ARTERIAL)  10/6/2023    IR LOWER EXTREMITY ANGIOGRAM RIGHT  05/25/2021    IR LOWER EXTREMITY ANGIOGRAM RIGHT  10/5/2023    IR OR ANGIOGRAM  6/23/2021    IR OR ANGIOGRAM  12/28/2022    LAPAROSCOPIC CHOLECYSTECTOMY N/A 09/27/2017    Procedure: LAPAROSCOPIC CHOLECYSTECTOMY;  LAPAROSCOPIC CHOLECYSTECTOMY;  Surgeon: Jacoby Tapia MD;  Location: [facility]    LAPAROSCOPY DIAGNOSTIC (GENERAL) N/A 8/11/2021    Procedure: Exploratory laparoscopy,  laparoscopic lysis of adhesions, laparotomy;  Surgeon: Corina Ferreira MD;  Location:  OR    ORTHOPEDIC SURGERY      left knee surgery    REPAIR ANEURYSM FEMORAL ARTERY Left 12/28/2022    Procedure: REPAIR LEFT FEMORAL PSEUDOANEURYSM;  Surgeon: Chadwick Lozano MD;  Location:  OR    VASCULAR SURGERY  aoto bi fem  left fem-AK pop    [facility] FABRIC WRAPPING OF ABDOMINAL ANEURYSM      [facility] NONSPECIFIC PROCEDURE  12/2000    angioplasty with stent right fem. a.    [facility] NONSPECIFIC PROCEDURE  1987    sinus surgery    [facility] NONSPECIFIC PROCEDURE  09/02/2009    Emergent left groin exploration with oversewing of bleeding angiographic site. 2. Endarterectomy of common femoral-proximal superficial femoral artery with greater saphenous vein patch angioplasty.   a. Laurel of accessory right greater saphenous vein.     [facility] NONSPECIFIC PROCEDURE  08/27/2009    occluded left common iliac and external iliac arteries were successfully revascularized with stenting to 8 and 7 mm       Allergies   Allergen Reactions    Contrast Dye Anaphylaxis     RASH, FACIAL AND NECK SWELLING, SOB, WHEEZING    Pantoprazole      Protonix caused diffuse edema    Chantix [Varenicline]      Terrible dreams    Penicillins Itching      Social History     Tobacco Use    Smoking status: Every Day     Packs/day: 0.50     Years: 52.00     Additional pack years: 0.00     Total pack years: 26.00     Types: Cigarettes     Start date: 1968    Smokeless tobacco: Never     Tobacco comments:     1/2 PPD   Substance Use Topics    Alcohol use: Not Currently     Comment: x1-2 yr      Wt Readings from Last 1 Encounters:   10/11/23 52.5 kg (115 lb 11.2 oz)        Anesthesia Evaluation   Pt has had prior anesthetic. Type: General.    No history of anesthetic complications       ROS/MED HX  ENT/Pulmonary:     (+)           allergic rhinitis,     tobacco use, Current use,    asthma    COPD,           (-) sleep apnea   Neurologic:    (-) no CVA and no TIA   Cardiovascular:     (+) Dyslipidemia hypertension- Peripheral Vascular Disease-  CAD - past MI - -      CHF  Last EF: 30-35                         Previous cardiac testing   Echo: Date: 10/3/23 Results:  Complete Portable Echo Adult. Optison (NDC #9827-7902) given intravenously.  Technically difficult study.  ______________________________________________________________________________  Interpretation Summary     Technically difficult study with some improvement after contrast use.     Normal LV size and wall thickness with severely reduced LV systolic function.  Visually estimated LVEF is around 30 to 35%.  Mid to distal portions of the left ventricle are completely akinetic to  severely hypokinetic. Basal segment contractility seems preserved. Findings  likely suggest stress-induced cardiomyopathy.  Normal RV size and systolic function.  No hemodynamically significant valve disease.  IVC is normal in size and collapsible.    Stress Test:  Date:  Results:  Name: KASIA WAN  MRN: [id number]  : 1960  Study Date: 2021 12:57 PM  Age: 60 yrs  Gender: Female  Patient Location: [facility]  Reason For Study: Light headedness  Ordering Physician: JACKIE DOWNEY  Referring Physician: Vaqsuez Benoit  Performed By: Corey James RDCS     BSA: 1.5 m2  Height: 62 in  Weight: 112 lb  HR: 57  BP: 140/78 mmHg  ______________________________________________________________________________  Procedure  Stress Echo Complete.      ______________________________________________________________________________  Interpretation Summary     Suboptimal stress test due to inadequate maximum heart rate.  Normal left ventricular function and wall motion at rest and post-stress but  LV size and function were unchanged following limited exercise of 3 minutes.  Post-exercise images were obtained with the heart rate in the 70's bpm.  Blood pressure seth appropriately from 140/78 mmHg (supine, baseline) to  158/80 mmHg during Stage I and fell to 134/74 mmHg in recovery. Consider  Lexiscan stress imaging (nuclear, MRI) for adequate testing.  ______________________________________________________________________________  Stress  Exercise was stopped due to fatigue.  There was a normal BP response to exercise.  Target Heart Rate was not achieved due to fatigue.  Suboptimal stress test due to inadequate maximum heart rate.  J point/ST elevation at rest pseudonormalized with exercise.  There was no arrhythmia.  The Duke treadmill score was intermediate risk ( -11< Sterling score <5).  Normal left ventricular function and wall motion at rest and post-stress.  The visual ejection fraction is estimated at 60-65%.     Baseline  The patient is in normal sinus rhythm.  Baseline ECG displays Q waves in the mike-septal leads.  Elevated J point V3-V6, inferior leads - most C/W early repolarization.  Normal left ventricular function and wall motion at rest and post-stress.  The visual ejection fraction is estimated at 60-65%.     Stress Results         Protocol:  Eliezer Protocol        Maximum Predicted HR:   158 bpm         Target HR: 134 bpm               % Maximum Predicted HR: 66 %        Stage  DurationHeart Rate  BP                    Comment             (mm:ss)   (bpm)    Stage 1   3:00      104   158/80Patient requested to stop   RecoveryR  4:00      56    134/74RPP = [id number], Sterling = 2, FAC = Below average             Stress Duration:   3:00 mm:ss        Recovery  Time: 4:00 mm:ss          Maximum Stress HR: 104 bpm           METS:          4     Mitral Valve  There is no mitral regurgitation noted.     Tricuspid Valve  No tricuspid regurgitation.     Aortic Valve  No aortic regurgitation is present. No hemodynamically significant valvular  aortic stenosis.  ______________________________________________________________________________  MMode/2D Measurements & Calculations  IVSd: 1.00 cm     LVIDd: 4.0 cm  LVIDs: 2.8 cm  LVPWd: 0.73 cm  ECG Reviewed:  Date: Results:    Cath:  Date: 10/4/23 Results:     Prox RCA lesion is 45% stenosed.     RPAV-1 lesion is 30% stenosed.     RPAV-2 lesion is 50% stenosed.     1st Mrg lesion is 45% stenosed.     1st Diag lesion is 100% stenosed.     Dist Cx lesion is 55% stenosed.     1. Right dominant coronary anatomy.  2. Completely occluded (probably chronic) D1 with left to left collaterals from distal LAD to D1.  3. Moderate coronary artery disease involving proximal to mid RCA which is not hemodynamically significant-IFR negative.  4. Moderate coronary disease involving distal small-caliber RPL and distal circumflex artery.   (-) valvular problems/murmurs   METS/Exercise Tolerance: 3 - Able to walk 1-2 blocks without stopping    Hematologic: Comments: Lupus    (+) History of blood clots,     anemia,          Musculoskeletal: Comment: Charcot Breonna Tooth - neg musculoskeletal ROS     GI/Hepatic:     (+) GERD,                (-) liver disease   Renal/Genitourinary:    (-) renal disease   Endo:    (-) Type II DM, thyroid disease and obesity   Psychiatric/Substance Use:     (+) psychiatric history anxiety and depression       Infectious Disease:  - neg infectious disease ROS     Malignancy:  - neg malignancy ROS     Other:  - neg other ROS          Physical Exam    Airway        Mallampati: III   TM distance: > 3 FB   Neck ROM: full   Mouth opening: > 3 cm    Respiratory Devices and Support         Dental       (+) Multiple visibly decayed,  broken teeth    B=Bridge, C=Chipped, L=Loose, M=Missing    Cardiovascular          Rhythm and rate: regular and normal     Pulmonary           (+) decreased breath sounds           OUTSIDE LABS:  CBC:   Lab Results   Component Value Date    WBC 6.1 10/11/2023    WBC 7.0 10/10/2023    HGB 7.2 (L) 10/11/2023    HGB 7.2 (L) 10/10/2023    HCT 21.7 (L) 10/11/2023    HCT 22.0 (L) 10/10/2023     10/11/2023     (L) 10/10/2023     BMP:   Lab Results   Component Value Date     10/09/2023     10/07/2023    POTASSIUM 4.1 10/11/2023    POTASSIUM 4.0 10/10/2023    CHLORIDE 104 10/09/2023    CHLORIDE 106 10/07/2023    CO2 24 10/09/2023    CO2 15 (L) 10/07/2023    BUN 10.8 10/09/2023    BUN 21.2 10/07/2023    CR 0.90 10/11/2023    CR 0.75 10/10/2023    GLC 82 10/09/2023    GLC 81 10/08/2023     COAGS:   Lab Results   Component Value Date    PTT 54 (H) 10/08/2023    INR 1.38 (H) 10/07/2023    FIBR 118 (L) 10/07/2023     POC:   Lab Results   Component Value Date    BGM 87 07/08/2021     HEPATIC:   Lab Results   Component Value Date    ALBUMIN 2.9 (L) 10/07/2023    PROTTOTAL 4.3 (L) 10/07/2023    ALT 15 10/07/2023    AST 18 10/07/2023    ALKPHOS 35 10/07/2023    BILITOTAL 0.2 10/07/2023     OTHER:   Lab Results   Component Value Date    PH 7.27 (L) 10/04/2023    LACT <0.3 10/04/2023    A1C 5.3 02/15/2022    SONIA 7.9 (L) 10/09/2023    PHOS 4.0 07/09/2021    MAG 1.8 10/11/2023    LIPASE 132 08/10/2021    AMYLASE 46 08/14/2017    TSH 0.77 01/05/2021    T4 1.19 03/28/2017    CRP <2.9 09/18/2014    SED 19 12/09/2022       Anesthesia Plan    ASA Status:  3, emergent    NPO Status:  NPO Appropriate    Anesthesia Type: General.     - Airway: ETT   Induction: Intravenous, Propofol.   Maintenance: Balanced.   Techniques and Equipment:     - Airway: Video-Laryngoscope       Consents    Anesthesia Plan(s) and associated risks, benefits, and realistic alternatives discussed. Questions answered and patient/representative(s)  expressed understanding.     - Discussed: Risks, Benefits and Alternatives for the PROCEDURE were discussed     - Discussed with:  Patient            Postoperative Care    Pain management: IV analgesics, Oral pain medications.   PONV prophylaxis: Ondansetron (or other 5HT-3), Background Propofol Infusion, Dexamethasone or Solumedrol     Comments:                Case Lentz MD

## 2023-10-11 NOTE — BRIEF OP NOTE
[facility]    Brief Operative Note    Pre-operative diagnosis: Parotid mass [K11.8]  Post-operative diagnosis Tamanna-Parotid/deep neck lymphoma    Procedure: Right Parotid Biopsy, Right - Neck    Surgeon: Surgeon(s) and Role:     * Randal Mendoza MD - Primary  Anesthesia: General   Estimated Blood Loss: Minimal    Drains: None  Specimens:   ID Type Source Tests Collected by Time Destination   1 : right neck lymph node for lymphoma work up Biopsy Lymph Node(s), Neck, Right SURGICAL PATHOLOGY EXAM Randal Mendoza MD 10/11/2023  7:57 AM      Findings:   Deep neck lymph node  Complications: None.  Implants: * No implants in log *

## 2023-10-11 NOTE — ANESTHESIA PREPROCEDURE EVALUATION
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 clot    Post-menopausal     Reflux esophagitis 2004    EGD: esophagitis and medium HH    SBO (small bowel obstruction) (H) 08/10/2021    SVT (supraventricular tachycardia)     Thrombocytopenia (H24)     Uncomplicated asthma     Vitamin C deficiency 2018      Past Surgical History:   Procedure Laterality Date    ANGIOGRAM      ANGIOGRAM Right 2021    Procedure: RIGHT LOWER EXTREMITY ANGIOGRAM WITH LEFT BRACHIAL ARTERY CUTDOWN;  Surgeon: José Luis Hernandez MD;  Location:  OR    BYPASS GRAFT FEMOROPOPLITEAL Right 2020    Procedure: RIGHT FEMORAL GRAFT TO ABOVE-KNEE POPLITEAL BYPASS USING CADAVERIC SUPERFICIAL FEMORAL ARTERY;  Surgeon: Chadwick Lozano MD;  Location:  OR    BYPASS GRAFT FEMOROPOPLITEAL Right 2/15/2022    Procedure: RIGHT SUPERFICIAL FEMORAL ARTERY GRAFT TO BELOW KNEE POPLITEAL BYPASS WITH CADAVERIC CRYOLIFE  FEMORAL-POPLITEAL ARTERY SIZE: OUTER DIAMETER: 7MM - 6MM;  Surgeon: Chadwick Lozano  Dawson;  Location:  OR    BYPASS GRAFT FEMOROPOPLITEAL Right 5/26/2023    Procedure: RIGHT DISTAL SUPERFICIAL FEMORAL GRAFT TO ANTERIOR TIBIAL ARTERY WITH 26 CENTIMETER CADAVERIC SUPERFICIAL FEMORAL ARTERY GRAFT;  Surgeon: Chadwick Lozano MD;  Location:  OR    BYPASS GRAFT INSITU FEMOROPOPLITEAL Right 7/7/2021    Procedure: CREATION RIGHT FEMORAL TO POPLITEAL ARTERIAL BYPASS USING INSITU VEIN GRAFT;  Surgeon: José Luis Hernandez MD;  Location:  OR    CARDIAC CATHERIZATION  09/03/2009    multivessel CAD without target lesions, med mgmt indicated, preserved ef    CARPAL TUNNEL RELEASE RT/LT Right 05/20/2021    CV CORONARY ANGIOGRAM N/A 10/4/2023    Procedure: Coronary Angiogram;  Surgeon: Rogelio Ricks MD;  Location:  HEART CARDIAC CATH LAB    CV PCI N/A 10/4/2023    Procedure: Percutaneous Coronary Intervention;  Surgeon: Rogelio Ricks MD;  Location:  HEART CARDIAC CATH LAB    EMBOLECTOMY LOWER EXTREMITY Right 10/6/2023    Procedure: Right anterior tibial bypass with graft, Right tibial endarterectomy,thrombectomy, Right doraslis pedis thrombectomy, Anterior Tibial vein patch.;  Surgeon: Chadwick Lozano MD;  Location:  OR    ENDARTERECTOMY CAROTID Right 05/11/2016    Procedure: ENDARTERECTOMY CAROTID;  Surgeon: Chadwick Lozano MD;  Location:  OR    ENDARTERECTOMY CAROTID Left 06/08/2020    Procedure: LEFT CAROTID ENDARTERECTOMY with distal facal vein patch  and EEG;  Surgeon: Chadwick Lozano MD;  Location:  OR    FINE NEEDLE ASPIRATION WITHOUT IMAGING GUIDANCE Right 9/22/2023    Procedure: Fine needle aspiration without imaging guidance;  Surgeon: Kiersten Aguilera MD;  Location: [facility]    FLUORESCENCE INTRAOPERATIVE VASCULAR ANGIOGRAPHY Right 12/28/2022    Procedure: RIGHT LEG ANGIOGRAM with intervention;  Surgeon: Chadwick Lozano MD;  Location:  OR    GYN SURGERY  left tube    left salpingectomy    IR ANGIOGRAM THROUGH CATHETER  (ARTERIAL)  10/6/2023    IR ANGIOGRAM THROUGH CATHETER (ARTERIAL)  10/6/2023    IR LOWER EXTREMITY ANGIOGRAM RIGHT  05/25/2021    IR LOWER EXTREMITY ANGIOGRAM RIGHT  10/5/2023    IR OR ANGIOGRAM  6/23/2021    IR OR ANGIOGRAM  12/28/2022    LAPAROSCOPIC CHOLECYSTECTOMY N/A 09/27/2017    Procedure: LAPAROSCOPIC CHOLECYSTECTOMY;  LAPAROSCOPIC CHOLECYSTECTOMY;  Surgeon: Jacoby Tapia MD;  Location: [facility]    LAPAROSCOPY DIAGNOSTIC (GENERAL) N/A 8/11/2021    Procedure: Exploratory laparoscopy,  laparoscopic lysis of adhesions, laparotomy;  Surgeon: Corina Ferreira MD;  Location:  OR    ORTHOPEDIC SURGERY      left knee surgery    REPAIR ANEURYSM FEMORAL ARTERY Left 12/28/2022    Procedure: REPAIR LEFT FEMORAL PSEUDOANEURYSM;  Surgeon: Chadwick Lozano MD;  Location:  OR    VASCULAR SURGERY  aoto bi fem  left fem-AK pop    [facility] FABRIC WRAPPING OF ABDOMINAL ANEURYSM      [facility] NONSPECIFIC PROCEDURE  12/2000    angioplasty with stent right fem. a.    [facility] NONSPECIFIC PROCEDURE  1987    sinus surgery    [facility] NONSPECIFIC PROCEDURE  09/02/2009    Emergent left groin exploration with oversewing of bleeding angiographic site. 2. Endarterectomy of common femoral-proximal superficial femoral artery with greater saphenous vein patch angioplasty.   a. Greenwald of accessory right greater saphenous vein.     [facility] NONSPECIFIC PROCEDURE  08/27/2009    occluded left common iliac and external iliac arteries were successfully revascularized with stenting to 8 and 7 mm       Allergies   Allergen Reactions    Contrast Dye Anaphylaxis     RASH, FACIAL AND NECK SWELLING, SOB, WHEEZING    Pantoprazole      Protonix caused diffuse edema    Chantix [Varenicline]      Terrible dreams    Penicillins Itching      Social History     Tobacco Use    Smoking status: Every Day     Packs/day: 0.50     Years: 52.00     Additional pack years: 0.00     Total pack years: 26.00     Types: Cigarettes     Start date: 1968    Smokeless tobacco: Never     Tobacco comments:     1/2 PPD   Substance Use Topics    Alcohol use: Not Currently     Comment: x1-2 yr      Wt Readings from Last 1 Encounters:   10/11/23 52.5 kg (115 lb 11.2 oz)        Anesthesia Evaluation   Pt has had prior anesthetic. Type: General.    No history of anesthetic complications       ROS/MED HX  ENT/Pulmonary:     (+)           allergic rhinitis,     tobacco use, Current use,    asthma    COPD,           (-) sleep apnea   Neurologic:       Cardiovascular:     (+) Dyslipidemia hypertension- Peripheral Vascular Disease-  CAD - past MI - -      CHF  Last EF: 30-35                         Previous cardiac testing   Echo: Date: 10/3/23 Results:  Complete Portable Echo Adult. Optison (NDC #2904-8043) given intravenously.  Technically difficult study.  ______________________________________________________________________________  Interpretation Summary     Technically difficult study with some improvement after contrast use.     Normal LV size and wall thickness with severely reduced LV systolic function.  Visually estimated LVEF is around 30 to 35%.  Mid to distal portions of the left ventricle are completely akinetic to  severely hypokinetic. Basal segment contractility seems preserved. Findings  likely suggest stress-induced cardiomyopathy.  Normal RV size and systolic function.  No hemodynamically significant valve disease.  IVC is normal in size and collapsible.    Stress Test:  Date:  Results:  Name: KASIA WAN  MRN: [id number]  : 1960  Study Date: 2021 12:57 PM  Age: 60 yrs  Gender: Female  Patient Location: [facility]  Reason For Study: Light headedness  Ordering Physician: JACKIE DOWNEY  Referring Physician: Vasquez Benoit  Performed By: Corey James RDCS     BSA: 1.5 m2  Height: 62 in  Weight: 112 lb  HR: 57  BP: 140/78 mmHg  ______________________________________________________________________________  Procedure  Stress Echo Complete.      ______________________________________________________________________________  Interpretation Summary     Suboptimal stress test due to inadequate maximum heart rate.  Normal left ventricular function and wall motion at rest and post-stress but  LV size and function were unchanged following limited exercise of 3 minutes.  Post-exercise images were obtained with the heart rate in the 70's bpm.  Blood pressure seth appropriately from 140/78 mmHg (supine, baseline) to  158/80 mmHg during Stage I and fell to 134/74 mmHg in recovery. Consider  Lexiscan stress imaging (nuclear, MRI) for adequate testing.  ______________________________________________________________________________  Stress  Exercise was stopped due to fatigue.  There was a normal BP response to exercise.  Target Heart Rate was not achieved due to fatigue.  Suboptimal stress test due to inadequate maximum heart rate.  J point/ST elevation at rest pseudonormalized with exercise.  There was no arrhythmia.  The Duke treadmill score was intermediate risk ( -11< Sterling score <5).  Normal left ventricular function and wall motion at rest and post-stress.  The visual ejection fraction is estimated at 60-65%.     Baseline  The patient is in normal sinus rhythm.  Baseline ECG displays Q waves in the mike-septal leads.  Elevated J point V3-V6, inferior leads - most C/W early repolarization.  Normal left ventricular function and wall motion at rest and post-stress.  The visual ejection fraction is estimated at 60-65%.     Stress Results         Protocol:  Eliezer Protocol        Maximum Predicted HR:   158 bpm         Target HR: 134 bpm               % Maximum Predicted HR: 66 %        Stage  DurationHeart Rate  BP                    Comment             (mm:ss)   (bpm)    Stage 1   3:00      104   158/80Patient requested to stop   RecoveryR  4:00      56    134/74RPP = 83123, Sterling = 2, FAC = Below average             Stress Duration:   3:00 mm:ss        Recovery  Time: 4:00 mm:ss          Maximum Stress HR: 104 bpm           METS:          4     Mitral Valve  There is no mitral regurgitation noted.     Tricuspid Valve  No tricuspid regurgitation.     Aortic Valve  No aortic regurgitation is present. No hemodynamically significant valvular  aortic stenosis.  ______________________________________________________________________________  MMode/2D Measurements & Calculations  IVSd: 1.00 cm     LVIDd: 4.0 cm  LVIDs: 2.8 cm  LVPWd: 0.73 cm  ECG Reviewed:  Date: Results:    Cath:  Date: 10/4/23 Results:     Prox RCA lesion is 45% stenosed.     RPAV-1 lesion is 30% stenosed.     RPAV-2 lesion is 50% stenosed.     1st Mrg lesion is 45% stenosed.     1st Diag lesion is 100% stenosed.     Dist Cx lesion is 55% stenosed.     1. Right dominant coronary anatomy.  2. Completely occluded (probably chronic) D1 with left to left collaterals from distal LAD to D1.  3. Moderate coronary artery disease involving proximal to mid RCA which is not hemodynamically significant-IFR negative.  4. Moderate coronary disease involving distal small-caliber RPL and distal circumflex artery.   (-) valvular problems/murmurs   METS/Exercise Tolerance: 3 - Able to walk 1-2 blocks without stopping    Hematologic: Comments: Lupus      Musculoskeletal: Comment: Charcot Breonna Tooth      GI/Hepatic:     (+) GERD,                (-) liver disease   Renal/Genitourinary:    (-) renal disease   Endo:    (-) Type II DM and thyroid disease   Psychiatric/Substance Use:     (+) psychiatric history anxiety and depression       Infectious Disease:       Malignancy:       Other:            Physical Exam    Airway        Mallampati: III   TM distance: > 3 FB   Neck ROM: full   Mouth opening: > 3 cm    Respiratory Devices and Support         Dental       (+) loose    B=Bridge, C=Chipped, L=Loose, M=Missing    Cardiovascular          Rhythm and rate: regular and normal     Pulmonary           (+) decreased breath sounds            OUTSIDE LABS:  CBC:   Lab Results   Component Value Date    WBC 6.1 10/11/2023    WBC 7.0 10/10/2023    HGB 7.2 (L) 10/11/2023    HGB 7.2 (L) 10/10/2023    HCT 21.7 (L) 10/11/2023    HCT 22.0 (L) 10/10/2023     10/11/2023     (L) 10/10/2023     BMP:   Lab Results   Component Value Date     10/09/2023     10/07/2023    POTASSIUM 4.1 10/11/2023    POTASSIUM 4.0 10/10/2023    CHLORIDE 104 10/09/2023    CHLORIDE 106 10/07/2023    CO2 24 10/09/2023    CO2 15 (L) 10/07/2023    BUN 10.8 10/09/2023    BUN 21.2 10/07/2023    CR 0.90 10/11/2023    CR 0.75 10/10/2023    GLC 82 10/09/2023    GLC 81 10/08/2023     COAGS:   Lab Results   Component Value Date    PTT 54 (H) 10/08/2023    INR 1.38 (H) 10/07/2023    FIBR 118 (L) 10/07/2023     POC:   Lab Results   Component Value Date    BGM 87 07/08/2021     HEPATIC:   Lab Results   Component Value Date    ALBUMIN 2.9 (L) 10/07/2023    PROTTOTAL 4.3 (L) 10/07/2023    ALT 15 10/07/2023    AST 18 10/07/2023    ALKPHOS 35 10/07/2023    BILITOTAL 0.2 10/07/2023     OTHER:   Lab Results   Component Value Date    PH 7.27 (L) 10/04/2023    LACT <0.3 10/04/2023    A1C 5.3 02/15/2022    SONIA 7.9 (L) 10/09/2023    PHOS 4.0 07/09/2021    MAG 1.8 10/11/2023    LIPASE 132 08/10/2021    AMYLASE 46 08/14/2017    TSH 0.77 01/05/2021    T4 1.19 03/28/2017    CRP <2.9 09/18/2014    SED 19 12/09/2022       Anesthesia Plan    ASA Status:  3    NPO Status:  NPO Appropriate    Anesthesia Type: General.     - Airway: ETT   Induction: Intravenous, Propofol.   Maintenance: Balanced.        Consents    Anesthesia Plan(s) and associated risks, benefits, and realistic alternatives discussed. Questions answered and patient/representative(s) expressed understanding.     - Discussed:     - Discussed with:  Patient            Postoperative Care    Pain management: IV analgesics.   PONV prophylaxis: Ondansetron (or other 5HT-3), Dexamethasone or Solumedrol, Background Propofol  Infusion     Comments:                Vasquez Parnell MD

## 2023-10-12 ENCOUNTER — APPOINTMENT (OUTPATIENT)
Dept: PHYSICAL THERAPY | Facility: CLINIC | Age: 63
DRG: 270 | End: 2023-10-12
Payer: COMMERCIAL

## 2023-10-12 LAB
ANION GAP SERPL CALCULATED.3IONS-SCNC: 10 MMOL/L (ref 7–15)
BUN SERPL-MCNC: 11.6 MG/DL (ref 8–23)
CALCIUM SERPL-MCNC: 8.3 MG/DL (ref 8.8–10.2)
CHLORIDE SERPL-SCNC: 101 MMOL/L (ref 98–107)
CREAT SERPL-MCNC: 0.84 MG/DL (ref 0.51–0.95)
DEPRECATED HCO3 PLAS-SCNC: 22 MMOL/L (ref 22–29)
EGFRCR SERPLBLD CKD-EPI 2021: 77 ML/MIN/1.73M2
ERYTHROCYTE [DISTWIDTH] IN BLOOD BY AUTOMATED COUNT: 15.2 % (ref 10–15)
GLUCOSE SERPL-MCNC: 87 MG/DL (ref 70–99)
HCT VFR BLD AUTO: 27 % (ref 35–47)
HGB BLD-MCNC: 9.1 G/DL (ref 11.7–15.7)
MAGNESIUM SERPL-MCNC: 2 MG/DL (ref 1.7–2.3)
MCH RBC QN AUTO: 29.5 PG (ref 26.5–33)
MCHC RBC AUTO-ENTMCNC: 33.7 G/DL (ref 31.5–36.5)
MCV RBC AUTO: 88 FL (ref 78–100)
PLATELET # BLD AUTO: 164 10E3/UL (ref 150–450)
POTASSIUM SERPL-SCNC: 4.2 MMOL/L (ref 3.4–5.3)
RBC # BLD AUTO: 3.08 10E6/UL (ref 3.8–5.2)
SODIUM SERPL-SCNC: 133 MMOL/L (ref 135–145)
UFH PPP CHRO-ACNC: 0.25 IU/ML
UFH PPP CHRO-ACNC: 0.67 IU/ML
WBC # BLD AUTO: 9 10E3/UL (ref 4–11)

## 2023-10-12 PROCEDURE — 250N000011 HC RX IP 250 OP 636: Performed by: STUDENT IN AN ORGANIZED HEALTH CARE EDUCATION/TRAINING PROGRAM

## 2023-10-12 PROCEDURE — 250N000013 HC RX MED GY IP 250 OP 250 PS 637: Performed by: STUDENT IN AN ORGANIZED HEALTH CARE EDUCATION/TRAINING PROGRAM

## 2023-10-12 PROCEDURE — 97116 GAIT TRAINING THERAPY: CPT | Mod: GP

## 2023-10-12 PROCEDURE — 85520 HEPARIN ASSAY: CPT | Performed by: INTERNAL MEDICINE

## 2023-10-12 PROCEDURE — 80048 BASIC METABOLIC PNL TOTAL CA: CPT | Performed by: INTERNAL MEDICINE

## 2023-10-12 PROCEDURE — 83735 ASSAY OF MAGNESIUM: CPT | Performed by: STUDENT IN AN ORGANIZED HEALTH CARE EDUCATION/TRAINING PROGRAM

## 2023-10-12 PROCEDURE — 85027 COMPLETE CBC AUTOMATED: CPT | Performed by: STUDENT IN AN ORGANIZED HEALTH CARE EDUCATION/TRAINING PROGRAM

## 2023-10-12 PROCEDURE — 99231 SBSQ HOSP IP/OBS SF/LOW 25: CPT | Performed by: INTERNAL MEDICINE

## 2023-10-12 PROCEDURE — 120N000013 HC R&B IMCU

## 2023-10-12 PROCEDURE — 250N000013 HC RX MED GY IP 250 OP 250 PS 637: Performed by: SURGERY

## 2023-10-12 PROCEDURE — 258N000003 HC RX IP 258 OP 636: Performed by: STUDENT IN AN ORGANIZED HEALTH CARE EDUCATION/TRAINING PROGRAM

## 2023-10-12 PROCEDURE — 97530 THERAPEUTIC ACTIVITIES: CPT | Mod: GP

## 2023-10-12 RX ORDER — CLOPIDOGREL BISULFATE 75 MG/1
75 TABLET ORAL DAILY
Status: DISCONTINUED | OUTPATIENT
Start: 2023-10-12 | End: 2023-10-14 | Stop reason: HOSPADM

## 2023-10-12 RX ORDER — LISINOPRIL 2.5 MG/1
2.5 TABLET ORAL DAILY
Status: DISCONTINUED | OUTPATIENT
Start: 2023-10-12 | End: 2023-10-14 | Stop reason: HOSPADM

## 2023-10-12 RX ORDER — FERROUS SULFATE 325(65) MG
325 TABLET ORAL EVERY OTHER DAY
Status: DISCONTINUED | OUTPATIENT
Start: 2023-10-13 | End: 2023-10-14 | Stop reason: HOSPADM

## 2023-10-12 RX ADMIN — FLUTICASONE FUROATE AND VILANTEROL TRIFENATATE 1 PUFF: 200; 25 POWDER RESPIRATORY (INHALATION) at 09:09

## 2023-10-12 RX ADMIN — FERROUS SULFATE TAB 325 MG (65 MG ELEMENTAL FE) 325 MG: 325 (65 FE) TAB at 08:52

## 2023-10-12 RX ADMIN — ROSUVASTATIN CALCIUM 40 MG: 20 TABLET, FILM COATED ORAL at 22:26

## 2023-10-12 RX ADMIN — DOCUSATE SODIUM 100 MG: 100 CAPSULE, LIQUID FILLED ORAL at 20:18

## 2023-10-12 RX ADMIN — EMPAGLIFLOZIN 10 MG: 10 TABLET, FILM COATED ORAL at 08:52

## 2023-10-12 RX ADMIN — POLYETHYLENE GLYCOL 3350 17 G: 17 POWDER, FOR SOLUTION ORAL at 08:52

## 2023-10-12 RX ADMIN — GABAPENTIN 100 MG: 100 CAPSULE ORAL at 22:26

## 2023-10-12 RX ADMIN — SENNOSIDES AND DOCUSATE SODIUM 1 TABLET: 50; 8.6 TABLET ORAL at 08:52

## 2023-10-12 RX ADMIN — CLOPIDOGREL BISULFATE 75 MG: 75 TABLET ORAL at 08:52

## 2023-10-12 RX ADMIN — Medication 1 TABLET: at 18:17

## 2023-10-12 RX ADMIN — NICOTINE 1 PATCH: 14 PATCH, EXTENDED RELEASE TRANSDERMAL at 13:58

## 2023-10-12 RX ADMIN — SENNOSIDES AND DOCUSATE SODIUM 1 TABLET: 50; 8.6 TABLET ORAL at 20:18

## 2023-10-12 RX ADMIN — CARVEDILOL 3.12 MG: 3.12 TABLET, FILM COATED ORAL at 18:17

## 2023-10-12 RX ADMIN — DOCUSATE SODIUM 100 MG: 100 CAPSULE, LIQUID FILLED ORAL at 08:52

## 2023-10-12 RX ADMIN — CLINDAMYCIN PHOSPHATE 900 MG: 900 INJECTION, SOLUTION INTRAVENOUS at 00:01

## 2023-10-12 RX ADMIN — CLINDAMYCIN PHOSPHATE 900 MG: 900 INJECTION, SOLUTION INTRAVENOUS at 08:16

## 2023-10-12 RX ADMIN — OMEPRAZOLE 20 MG: 20 CAPSULE, DELAYED RELEASE ORAL at 08:52

## 2023-10-12 RX ADMIN — GABAPENTIN 100 MG: 100 CAPSULE ORAL at 08:52

## 2023-10-12 RX ADMIN — GABAPENTIN 100 MG: 100 CAPSULE ORAL at 13:57

## 2023-10-12 RX ADMIN — ACETAMINOPHEN 650 MG: 325 TABLET, FILM COATED ORAL at 22:26

## 2023-10-12 RX ADMIN — SODIUM CHLORIDE, POTASSIUM CHLORIDE, SODIUM LACTATE AND CALCIUM CHLORIDE: 600; 310; 30; 20 INJECTION, SOLUTION INTRAVENOUS at 02:46

## 2023-10-12 RX ADMIN — Medication 1 TABLET: at 08:52

## 2023-10-12 RX ADMIN — RIVAROXABAN 15 MG: 15 TABLET, FILM COATED ORAL at 09:24

## 2023-10-12 RX ADMIN — RIVAROXABAN 15 MG: 15 TABLET, FILM COATED ORAL at 18:17

## 2023-10-12 ASSESSMENT — ACTIVITIES OF DAILY LIVING (ADL)
ADLS_ACUITY_SCORE: 24
ADLS_ACUITY_SCORE: 28
ADLS_ACUITY_SCORE: 25
ADLS_ACUITY_SCORE: 24
ADLS_ACUITY_SCORE: 25
ADLS_ACUITY_SCORE: 28
ADLS_ACUITY_SCORE: 25
ADLS_ACUITY_SCORE: 25
ADLS_ACUITY_SCORE: 28
ADLS_ACUITY_SCORE: 28
ADLS_ACUITY_SCORE: 25
ADLS_ACUITY_SCORE: 28
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
## 2023-10-12 NOTE — PLAN OF CARE
Orientation: A&Ox4  Vitals/Pain: VSS on RA. Pt reports some minor pain from time to time, managing with PRN tylenol    Tele: SR w/ bradycardia  Lines/Drains: PICC R upper arm infusing heparin at 750 mL/hr and LR @ 75 mL/hr  LS: Clear  GI/: BS +, No BM this shift. Pt having adequate urine output throughout shift   Labs: Abnormal/Trends, Electrolyte Replacement- Xa recheck due at 0800.  Ambulation/Assist: SBA  Skin/Wounds: Femoral incision site, CDI; RLE incision site dressed, CDI; R foot incision site CDI.  Plan: Encourage ambulation as tolerated. Titration of heparin to therapeutic range.     Goal Outcome Evaluation:      Plan of Care Reviewed With: patient    Overall Patient Progress: improving    Outcome Evaluation: Pt pain improving. Pt tolerating ambulation well.

## 2023-10-12 NOTE — PLAN OF CARE
Goal Outcome Evaluation:  Orientations: A&Ox4  Vitals/Pain: VSS on RA. Denies pain.   Tele: SR w/ bigeminal PACs at times, and occ. PACs at other times, and inverted T wave.   Lines/Drains: PICC R arm. Blood return, SL.   Skin/Wounds: R groin incision WDL. RLE surgical sites dressings CDI.   GI/: Reg diet. Adequate UOP. No BM today.   Labs: Abnormal/Trends, Electrolyte Replacement- Hgb 9.1  Ambulation/Assist: SBA  Sleep Quality: Good  Plan: Continue to monitor CMS, intact during shift and at shift change. Pt ambulating throughout shift and up in chair. Discharge in next few days when medically stable.   Awaiting biopsy results. Hem/onc following.   Transitioned to PO blood thinners today.

## 2023-10-12 NOTE — PROGRESS NOTES
[facility]eme onc quick note    S/p excisional biopsy 10/11, we are waiting for final pathology.   Will discuss with patient once available, either inpatient or outpatient if discharged.   Noted acute thrombosis right femoral bypass with s evere ischemia requiring emergent thrombectomy while heparin was held for biopsy.   Plan for Xarelto and aspirin per vascular surgery  Given 2 units prbc for intraoperative blood loss     Patrick Dreyer, DO  [facility]

## 2023-10-13 ENCOUNTER — APPOINTMENT (OUTPATIENT)
Dept: PHYSICAL THERAPY | Facility: CLINIC | Age: 63
DRG: 270 | End: 2023-10-13
Payer: COMMERCIAL

## 2023-10-13 LAB
ANION GAP SERPL CALCULATED.3IONS-SCNC: 11 MMOL/L (ref 7–15)
BUN SERPL-MCNC: 11.4 MG/DL (ref 8–23)
CALCIUM SERPL-MCNC: 8.4 MG/DL (ref 8.8–10.2)
CHLORIDE SERPL-SCNC: 101 MMOL/L (ref 98–107)
CREAT SERPL-MCNC: 0.92 MG/DL (ref 0.51–0.95)
DEPRECATED HCO3 PLAS-SCNC: 24 MMOL/L (ref 22–29)
EGFRCR SERPLBLD CKD-EPI 2021: 69 ML/MIN/1.73M2
ERYTHROCYTE [DISTWIDTH] IN BLOOD BY AUTOMATED COUNT: 15.7 % (ref 10–15)
GLUCOSE SERPL-MCNC: 112 MG/DL (ref 70–99)
HCT VFR BLD AUTO: 26.8 % (ref 35–47)
HGB BLD-MCNC: 8.8 G/DL (ref 11.7–15.7)
MAGNESIUM SERPL-MCNC: 1.8 MG/DL (ref 1.7–2.3)
MCH RBC QN AUTO: 29.2 PG (ref 26.5–33)
MCHC RBC AUTO-ENTMCNC: 32.8 G/DL (ref 31.5–36.5)
MCV RBC AUTO: 89 FL (ref 78–100)
PATH REPORT.COMMENTS IMP SPEC: ABNORMAL
PATH REPORT.COMMENTS IMP SPEC: ABNORMAL
PATH REPORT.COMMENTS IMP SPEC: YES
PATH REPORT.FINAL DX SPEC: ABNORMAL
PATH REPORT.GROSS SPEC: ABNORMAL
PATH REPORT.MICROSCOPIC SPEC OTHER STN: ABNORMAL
PATH REPORT.RELEVANT HX SPEC: ABNORMAL
PATH REPORT.SUPPLEMENTAL REPORTS SPEC: ABNORMAL
PHOTO IMAGE: ABNORMAL
PLATELET # BLD AUTO: 185 10E3/UL (ref 150–450)
POTASSIUM SERPL-SCNC: 4 MMOL/L (ref 3.4–5.3)
RBC # BLD AUTO: 3.01 10E6/UL (ref 3.8–5.2)
SODIUM SERPL-SCNC: 136 MMOL/L (ref 135–145)
WBC # BLD AUTO: 6.4 10E3/UL (ref 4–11)

## 2023-10-13 PROCEDURE — 250N000013 HC RX MED GY IP 250 OP 250 PS 637: Performed by: STUDENT IN AN ORGANIZED HEALTH CARE EDUCATION/TRAINING PROGRAM

## 2023-10-13 PROCEDURE — 250N000013 HC RX MED GY IP 250 OP 250 PS 637: Performed by: INTERNAL MEDICINE

## 2023-10-13 PROCEDURE — 99232 SBSQ HOSP IP/OBS MODERATE 35: CPT | Performed by: INTERNAL MEDICINE

## 2023-10-13 PROCEDURE — 250N000011 HC RX IP 250 OP 636: Mod: JZ | Performed by: STUDENT IN AN ORGANIZED HEALTH CARE EDUCATION/TRAINING PROGRAM

## 2023-10-13 PROCEDURE — 97116 GAIT TRAINING THERAPY: CPT | Mod: GP

## 2023-10-13 PROCEDURE — 82435 ASSAY OF BLOOD CHLORIDE: CPT | Performed by: INTERNAL MEDICINE

## 2023-10-13 PROCEDURE — 120N000013 HC R&B IMCU

## 2023-10-13 PROCEDURE — 250N000013 HC RX MED GY IP 250 OP 250 PS 637: Performed by: SURGERY

## 2023-10-13 PROCEDURE — 97530 THERAPEUTIC ACTIVITIES: CPT | Mod: GP

## 2023-10-13 PROCEDURE — 83735 ASSAY OF MAGNESIUM: CPT | Performed by: STUDENT IN AN ORGANIZED HEALTH CARE EDUCATION/TRAINING PROGRAM

## 2023-10-13 PROCEDURE — 85027 COMPLETE CBC AUTOMATED: CPT | Performed by: STUDENT IN AN ORGANIZED HEALTH CARE EDUCATION/TRAINING PROGRAM

## 2023-10-13 RX ORDER — AMOXICILLIN 250 MG
2 CAPSULE ORAL 2 TIMES DAILY
Status: DISCONTINUED | OUTPATIENT
Start: 2023-10-13 | End: 2023-10-14 | Stop reason: HOSPADM

## 2023-10-13 RX ADMIN — ROSUVASTATIN CALCIUM 40 MG: 20 TABLET, FILM COATED ORAL at 21:09

## 2023-10-13 RX ADMIN — Medication 1 TABLET: at 08:04

## 2023-10-13 RX ADMIN — EMPAGLIFLOZIN 10 MG: 10 TABLET, FILM COATED ORAL at 08:04

## 2023-10-13 RX ADMIN — SENNOSIDES AND DOCUSATE SODIUM 2 TABLET: 50; 8.6 TABLET ORAL at 21:09

## 2023-10-13 RX ADMIN — CARVEDILOL 3.12 MG: 3.12 TABLET, FILM COATED ORAL at 18:08

## 2023-10-13 RX ADMIN — CLOPIDOGREL BISULFATE 75 MG: 75 TABLET ORAL at 08:04

## 2023-10-13 RX ADMIN — DOCUSATE SODIUM 100 MG: 100 CAPSULE, LIQUID FILLED ORAL at 08:04

## 2023-10-13 RX ADMIN — POLYETHYLENE GLYCOL 3350 17 G: 17 POWDER, FOR SOLUTION ORAL at 08:01

## 2023-10-13 RX ADMIN — GABAPENTIN 100 MG: 100 CAPSULE ORAL at 14:10

## 2023-10-13 RX ADMIN — Medication 1 TABLET: at 18:08

## 2023-10-13 RX ADMIN — FLUTICASONE FUROATE AND VILANTEROL TRIFENATATE 1 PUFF: 200; 25 POWDER RESPIRATORY (INHALATION) at 08:04

## 2023-10-13 RX ADMIN — DOCUSATE SODIUM 100 MG: 100 CAPSULE, LIQUID FILLED ORAL at 21:09

## 2023-10-13 RX ADMIN — CARVEDILOL 3.12 MG: 3.12 TABLET, FILM COATED ORAL at 08:08

## 2023-10-13 RX ADMIN — ONDANSETRON 4 MG: 2 INJECTION INTRAMUSCULAR; INTRAVENOUS at 09:20

## 2023-10-13 RX ADMIN — NICOTINE 1 PATCH: 14 PATCH, EXTENDED RELEASE TRANSDERMAL at 14:10

## 2023-10-13 RX ADMIN — SENNOSIDES AND DOCUSATE SODIUM 1 TABLET: 50; 8.6 TABLET ORAL at 08:01

## 2023-10-13 RX ADMIN — LISINOPRIL 2.5 MG: 2.5 TABLET ORAL at 08:04

## 2023-10-13 RX ADMIN — RIVAROXABAN 15 MG: 15 TABLET, FILM COATED ORAL at 18:08

## 2023-10-13 RX ADMIN — ACETAMINOPHEN 650 MG: 325 TABLET, FILM COATED ORAL at 10:35

## 2023-10-13 RX ADMIN — RIVAROXABAN 15 MG: 15 TABLET, FILM COATED ORAL at 08:04

## 2023-10-13 RX ADMIN — OMEPRAZOLE 20 MG: 20 CAPSULE, DELAYED RELEASE ORAL at 08:01

## 2023-10-13 RX ADMIN — GABAPENTIN 100 MG: 100 CAPSULE ORAL at 21:09

## 2023-10-13 RX ADMIN — GABAPENTIN 100 MG: 100 CAPSULE ORAL at 08:01

## 2023-10-13 RX ADMIN — FERROUS SULFATE TAB 325 MG (65 MG ELEMENTAL FE) 325 MG: 325 (65 FE) TAB at 08:04

## 2023-10-13 ASSESSMENT — ACTIVITIES OF DAILY LIVING (ADL)
ADLS_ACUITY_SCORE: 24
ADLS_ACUITY_SCORE: 28
ADLS_ACUITY_SCORE: 24
ADLS_ACUITY_SCORE: 28
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
## 2023-10-13 NOTE — PLAN OF CARE
A+Ox4 VSS on room air. Tele Sinus bradycardia. Lung sounds clear. R groin site and RLE incisions CDI. Facial biopsy swelling unchanged, dressing CDI. LUE site bruised.Pedal pulse per doppler. CMS/neuros intact. Tylenol and scheduled meds for pain. Bowels active, flatus+, no BM. Voiding adequately. Up SBA. Tolerating regular diet.

## 2023-10-13 NOTE — PROGRESS NOTES
Physical Therapy Discharge Summary    Reason for therapy discharge:    Discharged to home.    Progress towards therapy goal(s). See goals on Care Plan in [facility] electronic health record for goal details.  Goals met    Therapy recommendation(s):    Continue walks with nursing staff with walker and shoes.       10/13/23 8327   Appointment Info   Signing Clinician's Name / Credentials (PT) Brittany Dressler, PT   Therapeutic Activity   Therapeutic Activities: dynamic activities to improve functional performance Minutes (44031) 8   Symptoms Noted During/After Treatment None   Treatment Detail/Skilled Intervention Pt agreeable to PT to re-assess safe mobility for safe DC: [place] supine-sit, EOB sit balance, SBA for shoes, [place] sit-satnds (EOB, toilet) with RW, pivots B [place], stand-sits [place]a with UE use. Finished in chair per nursing set-up.   Gait Training   Gait Training Minutes (88917) 10   Symptoms Noted During/After Treatment (Gait Training) other (see comments)   Treatment Detail/Skilled Intervention 350' RW, ortho shoe R and regular shoe L, distant supervision-[place]a - pt pacing well, stopping when leg starts to ache for break, slows down, etc. Slow with mild UE WB, steady.   PT Discharge Planning   PT Plan Acute PT services no longer warranted, pt appropriate to progress walks with nursing staff.   PT Discharge Recommendation (DC Rec) home   PT Rationale for DC Rec Pt is appropriate to DC with family support.   PT Brief overview of current status Distant supervision-[place] RW   PT Equipment Needed at Discharge   (pt has walker)   Total Session Time   Timed Code Treatment Minutes 18   Total Session Time (sum of timed and untimed services) 18
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
## 2023-10-13 NOTE — PLAN OF CARE
Orientations: A&Ox4.   Vitals/Pain: VSS on RA. Denies pain except c/o headache relieved by PRN Tylenol. CMS & pulses intact  Lines/Drains: 3 lumen PICC R arm SL.  Skin/Wounds: R groin incision WDL. RLE surgical sites dressings CDI.   GI/: Adequate UOP. +BS, +flatus, -BM. Brief episode of nausea this a.m. relieved by PRN Zofran X1. Tolerating regular diet.   Labs: Abnormal/Trends, Electrolyte Replacement- K 4 & Mg 1.8, wdl, redraw tomorrow. Hgb 8.8  Ambulation/Assist: SBA. Actively participating in therapies. Ambulating in halls X3  Plan: Continue to monitor CMS. Pt ambulating throughout shift and up in chair. Discharge in next few days when medically stable.
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
## 2023-10-14 VITALS
WEIGHT: 115.7 LBS | HEIGHT: 63 IN | BODY MASS INDEX: 20.5 KG/M2 | SYSTOLIC BLOOD PRESSURE: 118 MMHG | DIASTOLIC BLOOD PRESSURE: 63 MMHG | TEMPERATURE: 97.9 F | RESPIRATION RATE: 16 BRPM | OXYGEN SATURATION: 98 % | HEART RATE: 65 BPM

## 2023-10-14 LAB
CREAT SERPL-MCNC: 0.88 MG/DL (ref 0.51–0.95)
EGFRCR SERPLBLD CKD-EPI 2021: 73 ML/MIN/1.73M2
ERYTHROCYTE [DISTWIDTH] IN BLOOD BY AUTOMATED COUNT: 15.8 % (ref 10–15)
HCT VFR BLD AUTO: 29 % (ref 35–47)
HGB BLD-MCNC: 9.4 G/DL (ref 11.7–15.7)
MAGNESIUM SERPL-MCNC: 1.9 MG/DL (ref 1.7–2.3)
MCH RBC QN AUTO: 29 PG (ref 26.5–33)
MCHC RBC AUTO-ENTMCNC: 32.4 G/DL (ref 31.5–36.5)
MCV RBC AUTO: 90 FL (ref 78–100)
PLATELET # BLD AUTO: 226 10E3/UL (ref 150–450)
POTASSIUM SERPL-SCNC: 4.1 MMOL/L (ref 3.4–5.3)
RBC # BLD AUTO: 3.24 10E6/UL (ref 3.8–5.2)
WBC # BLD AUTO: 6.8 10E3/UL (ref 4–11)

## 2023-10-14 PROCEDURE — 250N000013 HC RX MED GY IP 250 OP 250 PS 637: Performed by: STUDENT IN AN ORGANIZED HEALTH CARE EDUCATION/TRAINING PROGRAM

## 2023-10-14 PROCEDURE — 82565 ASSAY OF CREATININE: CPT | Performed by: STUDENT IN AN ORGANIZED HEALTH CARE EDUCATION/TRAINING PROGRAM

## 2023-10-14 PROCEDURE — 99239 HOSP IP/OBS DSCHRG MGMT >30: CPT | Performed by: INTERNAL MEDICINE

## 2023-10-14 PROCEDURE — 83735 ASSAY OF MAGNESIUM: CPT | Performed by: HOSPITALIST

## 2023-10-14 PROCEDURE — 250N000013 HC RX MED GY IP 250 OP 250 PS 637: Performed by: SURGERY

## 2023-10-14 PROCEDURE — 85027 COMPLETE CBC AUTOMATED: CPT | Performed by: STUDENT IN AN ORGANIZED HEALTH CARE EDUCATION/TRAINING PROGRAM

## 2023-10-14 PROCEDURE — 84132 ASSAY OF SERUM POTASSIUM: CPT | Performed by: HOSPITALIST

## 2023-10-14 PROCEDURE — 250N000013 HC RX MED GY IP 250 OP 250 PS 637: Performed by: INTERNAL MEDICINE

## 2023-10-14 RX ORDER — POLYETHYLENE GLYCOL 3350 17 G/17G
17 POWDER, FOR SOLUTION ORAL DAILY
Qty: 510 G | Refills: 0 | Status: ON HOLD | OUTPATIENT
Start: 2023-10-14 | End: 2024-04-22

## 2023-10-14 RX ORDER — FERROUS SULFATE 325(65) MG
325 TABLET ORAL EVERY OTHER DAY
Qty: 60 TABLET | Refills: 1 | Status: ON HOLD | OUTPATIENT
Start: 2023-10-15 | End: 2024-04-22

## 2023-10-14 RX ORDER — LISINOPRIL 5 MG/1
5 TABLET ORAL DAILY
Qty: 30 TABLET | Refills: 0 | Status: SHIPPED | OUTPATIENT
Start: 2023-10-14 | End: 2023-11-13

## 2023-10-14 RX ORDER — AMOXICILLIN 250 MG
1 CAPSULE ORAL DAILY PRN
Qty: 30 TABLET | Refills: 0 | Status: ON HOLD | OUTPATIENT
Start: 2023-10-14 | End: 2024-05-15

## 2023-10-14 RX ORDER — CARVEDILOL 3.12 MG/1
3.12 TABLET ORAL 2 TIMES DAILY WITH MEALS
Qty: 60 TABLET | Refills: 0 | Status: SHIPPED | OUTPATIENT
Start: 2023-10-14 | End: 2023-11-13 | Stop reason: DRUGHIGH

## 2023-10-14 RX ORDER — NICOTINE 21 MG/24HR
1 PATCH, TRANSDERMAL 24 HOURS TRANSDERMAL DAILY
Qty: 30 PATCH | Refills: 1 | Status: ON HOLD | OUTPATIENT
Start: 2023-10-14 | End: 2024-06-07

## 2023-10-14 RX ADMIN — Medication 1 TABLET: at 08:37

## 2023-10-14 RX ADMIN — DOCUSATE SODIUM 100 MG: 100 CAPSULE, LIQUID FILLED ORAL at 08:37

## 2023-10-14 RX ADMIN — RIVAROXABAN 15 MG: 15 TABLET, FILM COATED ORAL at 08:37

## 2023-10-14 RX ADMIN — OMEPRAZOLE 20 MG: 20 CAPSULE, DELAYED RELEASE ORAL at 08:37

## 2023-10-14 RX ADMIN — LISINOPRIL 2.5 MG: 2.5 TABLET ORAL at 08:37

## 2023-10-14 RX ADMIN — CLOPIDOGREL BISULFATE 75 MG: 75 TABLET ORAL at 08:37

## 2023-10-14 RX ADMIN — SENNOSIDES AND DOCUSATE SODIUM 2 TABLET: 50; 8.6 TABLET ORAL at 08:38

## 2023-10-14 RX ADMIN — EMPAGLIFLOZIN 10 MG: 10 TABLET, FILM COATED ORAL at 08:37

## 2023-10-14 RX ADMIN — CARVEDILOL 3.12 MG: 3.12 TABLET, FILM COATED ORAL at 08:37

## 2023-10-14 RX ADMIN — GABAPENTIN 100 MG: 100 CAPSULE ORAL at 08:37

## 2023-10-14 RX ADMIN — FLUTICASONE FUROATE AND VILANTEROL TRIFENATATE 1 PUFF: 200; 25 POWDER RESPIRATORY (INHALATION) at 08:38

## 2023-10-14 RX ADMIN — POLYETHYLENE GLYCOL 3350 17 G: 17 POWDER, FOR SOLUTION ORAL at 08:37

## 2023-10-14 ASSESSMENT — ACTIVITIES OF DAILY LIVING (ADL)
ADLS_ACUITY_SCORE: 28

## 2023-10-14 NOTE — PROGRESS NOTES
Vascular Surgery Progress Note    S: Had a very good day yesterday.  Up walking more.  Tolerating diet.   Anxious to go home today    O:   Vitals:  BP  Min: 103/52  Max: 157/67  Temp  Av  F (36.7  C)  Min: 97.6  F (36.4  C)  Max: 98.4  F (36.9  C)  Pulse  Av.2  Min: 56  Max: 69  I/O last 3 completed shifts:  In: -   Out: 950 [Urine:950]    Physical Exam: Alert and appropriate.  Very comfortable.  Talkative.    Excellent Doppler right AT/DP    Groin incision with Tegaderm=A    Suture closure of calf and dorsal foot wounds=A    Swelling.  Normal CMS.    Right cervical biopsy incision=A      Assessment/Plan: Patient will be discharged today.  Instructed in postoperative cares.    On Plavix and Xarelto chronically    Iron for acute blood loss anemia    Understands wound care and activity    Follow-up with me in 2 weeks.      History of B-cell lymphoma followed by oncology      Chronic tobacco abuse--does really plan to quit smoking this time            Wm. Blake MD

## 2023-10-14 NOTE — PLAN OF CARE
[id number]    Orientation: A&Ox4.   Vitals/Pain: VSS on RA. Denies pain.  CMS & pulses intact with doppler  Lines/Drains: 3 lumen PICC R arm SL.  Skin/Wounds: R groin incision WDL. RLE surgical sites dressings CDI.   GI/: Adequate UOP. +BS, +flatus, -BM. Scheduled colace and senna given. Tolerating regular diet.   Labs: Abnormal/Trends, Electrolyte Replacement- K 4 & Mg 1.8, wdl, redraw in process. Hgb 8.8  Ambulation/Assist: SBA. Actively participating in therapies. Uses black shoe on R for longer walks.  Plan: Continue to monitor CMS. Pt ambulating throughout shift and up in chair. Discharge when medically stable.

## 2023-10-14 NOTE — PLAN OF CARE
Orientations: A&Ox4.   Vitals/Pain: VSS on RA. Denies pain. CMS & pulses intact  Lines/Drains: PICC removed prior to discharge.   Skin/Wounds: R groin incision WDL. RLE surgical sites dressings CDI.   GI/: Adequate UOP. +BS, +flatus, -BM. Tolerating regular diet. Denies nausea.  Labs: Abnormal/Trends, Electrolyte Replacement- K 4.1 & Mg 1.9, wdl, redraw tomorrow. Hgb 9.4  Ambulation/Assist: Up independent in room  Plan: Patient discharged back home. All discharge education reviewed with patient bedside & pharmacy supplied medications reviewed with & handed to pt prior to leaving Hospital.

## 2023-10-14 NOTE — DISCHARGE SUMMARY
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 of TPA was administered in the foot, anterior tibial artery, and  portions of the graft. An 018 wire was placed into the anterior tibial  artery and 3 mm balloon angioplasty performed in the distal  anastomosis. Possis device was used for mechanical thrombectomy  throughout the graft with resolution of thrombus within the graft and  the anterior tibial artery, though essentially no outflow in the foot.  More nitroglycerin and TPA were administered throughout these  segments. No restoration of antegrade blood flow was identified.  Therefore, a 50 cm infusion length catheter was placed throughout the  bypass grafts. Patient did have a palpable pulse in the graft and the  dorsalis pedis artery, though suspect this is a somewhat waterhammer  pulse as due to limited outflow. Plan will be continued TPA at 0.5  mg/hour through the infusion catheter and heparin through the short  sheath at 500 units/hour. Visual return the following day for repeat  angiogram.    Sedation: A moderate level of sedation was achieved with 6 mg IV  Versed and 100 mcg IV fentanyl. 6 mg TPA administered directly into  the graft and arterial system as well as 1350 mcg of nitroglycerin  administered into the arteries, especially  the anterior tibial and  dorsalis pedis arteries.  Sedation time: 104 minutes  Please note the above medications were administered by interventional  radiology staff under my direct supervision. Patient's vital signs  were monitored and remained stable throughout the procedure.  Fluoroscopic time: 22.5 minutes  Air kerma: 50 mGy  Contrast: 160 mL of Visipaque administered intra-arterially without  complication.  Local anesthetic: 10 mL 1% lidocaine.    FINDINGS: A total of 36 angiogram sequences and spot fluoroscopic  images obtained throughout the procedure. Initial angiogram through  the infusion catheter demonstrates patency of the distal graft as well  as the anterior tibial artery with mild stenosis at the distal  anastomosis. Once the infusion catheter was exchanged, suspect  thrombus noted along the portion of the catheter without side holes  and along the sheath, which was dislodged and without further  antegrade blood flow. Blood flow is now statement throughout both the  proximal and distal grafts. Additionally, the entire anterior tibial  artery spasmed. TPA and nitroglycerin was administered as well as 3 mm  balloon angioplasty at the distal anastomosis. There is periodic  improvement, though overall relatively stasis of blood flow. Thrombus  is noted forming in these segments and mechanical thrombectomy  performed with a Possis device. At completion, the graft is patent,  though essentially without outflow both related to spasm of the  anterior tibial artery and limited outflow. Angiogram was performed in  the dorsalis pedis artery with small filling defect, treated with TPA  and nitroglycerin. An infusion catheter was placed throughout the  grafts at completion for continued TPA administration. Interestingly,  the patient had palpable pulse in the graft and dorsalis pedis artery,  though suspect waterhammer pulse given limited outflow. Patient will  return the following day.      Impression     IMPRESSION:  1. Thrombus dislodged from more proximal aspect and extended distally  to the graft, anterior tibial artery and foot. Remaining efforts were  made in effort to establish adequate outflow, especially in the foot.  6 mg of TPA and 1350 mcg of nitroglycerin administered into the foot  and anterior tibial artery.  2. Bypass segments appear patent at completion, though stagnant blood  flow. Therefore 50 cm infusion length catheter will remain in place  with TPA administration at 0.5 mg/hour. Patient will return the  following day.  3. 3 mm balloon angioplasty at the distal anastomosis of the anterior  tibial artery.  4. Long 6 [place] sheath was exchanged for a short 6 [place] sheath.  5. First time mechanical thrombectomy performed during this  hospitalization with Possis device throughout the tandem bypass  grafts.    NICOLAS HOPSON MD         SYSTEM ID:  [id number]   CT Soft Tissue Neck w/o Contrast    Narrative    EXAM: CT SOFT TISSUE NECK W/O CONTRAST  LOCATION: [facility]  DATE: 10/7/2023    INDICATION: Lymphoma staging  COMPARISON: CT head 08/02/2023  TECHNIQUE: Routine CT Soft Tissue Neck without IV contrast. Multiplanar reformats. Dose reduction techniques were used.    FINDINGS:     MUCOSAL SPACES/SOFT TISSUES: Normal mucosal spaces of the upper aerodigestive tract within the limits of noncontrast imaging. No definite mucosal mass or inflammation identified. Normal vocal cords and infraglottic trachea. Normal parapharyngeal space   and subcutaneous soft tissues. Normal oral cavity,  spaces, and floor of mouth structures.    LYMPH NODES: Interval decrease in size of a right level 1B lymph node, currently measuring 0.8 x 1.2 cm (series 3, image 58), previously measuring 1.2 x 1.7 cm. A left level 1B lymph node currently measures 0.5 x 0.6 cm (series 3, image 60), previously   measuring 0.6 x 0.8 cm.     SALIVARY GLANDS: Normal parotid and submandibular  glands.    THYROID: Unchanged 0.7 x 0.9 cm low-density nodule in the right thyroid lobe.     VISUALIZED INTRACRANIAL/ORBITS/SINUSES: No abnormality of the visualized intracranial compartment or orbits. Visualized paranasal sinuses and mastoid air cells are clear.    OTHER: No destructive osseous lesion. Please see same day CT chest pelvis for pulmonary findings.      Impression    IMPRESSION:   1.  When compared to 08/02/2023, there has been interval decrease in size of the previously present enlarged right level 1B lymph node, currently measuring 0.8 x 1.2 cm, previously measuring 1.2 x 1.7 cm. A left level 1B lymph node has also decreased in   size, currently measuring 0.5 x 0.6 cm, previously measuring 0.6 x 0.8 cm.   IR Angiogram through catheter (arterial)    Narrative    INTERVENTIONAL RADIOLOGY ANGIOGRAM THROUGH CATHETER (ARTERIAL)   10/6/2023 5:47 PM    HISTORY:  62-year-old patient with continued thrombolysis treatment.  Patient was evaluated earlier in the day with emboli extending to the  foot with limited outflow. Therefore, infusion catheter was to remain  in place with additional TPA administration. Patient also had  considerable spasm of the anterior tibial artery. Patient returns for  evaluation.    TECHNIQUE: Patient was brought to interventional radiology department  and the right groin sheath and catheter were prepped and draped in  standard sterile fashion. 1% lidocaine was used for local anesthesia.  Angiogram was performed through the infusion catheter which is noted  throughout the length of the more proximal bypass as well as the more  distal bypass. The bypass grafts are patent, as is the anterior tibial  artery with good blood flow through the dorsalis pedis artery,  returned to baseline. Therefore, the infusion catheter was removed.  Access site is antegrade within the proximal bypass graft, therefore  closure device is not felt to be appropriate. Therefore, sheath  removed after TPA and  heparin had been held for 30 minutes and  hemostasis achieved with manual compression over a total of 60  minutes. During this entire time of compression, pulses were present  throughout the graft as well as the dorsalis pedis artery which were  checked frequently, greater than every 5 minutes. The patient  tolerated this segment of the procedure well. Patient had been treated  for contrast allergy with multiple rounds of steroids earlier in the  day.    Sedation: None  Sedation time: 14 minutes  Fluoroscopic time: 0.5 minutes  Air kerma: 4.83 mGy  Contrast: 20 mL of Visipaque administered intra-arterially without  complication.    FINDINGS: A total of five angiogram sequences obtained throughout the  procedure. Patient has a proximal femoral to above-knee popliteal  surgical bypass graft which is patent. Patient has an additional  bypass graft arising from the distal aspect of this graft to the  anterior tibial artery. These segments are patent with washout,  previously noted to be stagnant. Anterior tibial artery is patent and  not spasmed. Dorsalis pedis artery is patent with retrograde filling  of the distal posterior tibial artery, returned to baseline.      Impression    IMPRESSION: Completion of thrombolysis with patency of right lower  extremity bypass grafts and single vessel runoff via the anterior  tibial artery. Blood flow appears restored to baseline in the foot  with blood flow via the dorsalis pedis artery and retrograde filling  of the distal posterior tibial artery. Patient not a candidate for  closure device, therefore hemostasis was achieved with manual  compression for 60 minutes. Pulses were maintained within the graft  and dorsalis pedis artery throughout this time and upon patient's  return to the ICU.    NICOLAS HOPSON MD         SYSTEM ID:  [id number]   CT Chest Abdomen Pelvis w/o Contrast    Narrative    EXAM: CT CHEST, ABDOMEN, PELVIS WITHOUT CONTRAST  LOCATION: [facility]  DATE: 10/07/2023    INDICATION: Lymphoma staging.  COMPARISON: None.  TECHNIQUE: CT scan of the chest, abdomen, and pelvis was performed without IV contrast. Multiplanar reformats were obtained. Dose reduction techniques were used.   CONTRAST: None.    FINDINGS:   LUNGS AND PLEURA: Tiny benign fissural nodule on the left image 152. Additional fissural nodules along the minor fissure likely represent nonenlarged intrapulmonary lymph nodes. 3 mm subpleural nodule right lower lobe image 220 probably an additional   nonenlarged intrapulmonary lymph node. Benign granulomatous change. No effusions.    MEDIASTINUM/AXILLAE: No adenopathy demonstrated in the chest in the absence of contrast. No aneurysm.    CORONARY ARTERY CALCIFICATION: Moderate.    HEPATOBILIARY: Cholecystectomy. Unremarkable liver.    PANCREAS: No significant mass, duct dilatation, or inflammatory change.    SPLEEN: Normal size.    ADRENAL GLANDS: No significant nodules.    KIDNEYS/BLADDER: Left renal atrophy. Persistent nephrogram on the right, question renal injury.    BOWEL: No obstruction or inflammatory change.    LYMPH NODES: No gross abdominopelvic adenopathy in the absence of contrast.    VASCULATURE: There are extensive atherosclerotic changes of the visualized aorta and its branches. There is no evidence of aortic dissection or aneurysm. Aortobifem bypass change.    PELVIC ORGANS: No pelvic masses.    MUSCULOSKELETAL: No frankly destructive bony lesions.      Impression    IMPRESSION:  1.  No adenopathy demonstrated in the chest, abdomen, and pelvis in the absence of contrast.  2.  Persistent nephrogram on the right and left renal atrophy, question acute renal injury.  3.  A few scattered fissural nodules and subpleural nodules are noted in the lungs, likely nonenlarged intrapulmonary lymph nodes. These can be reevaluated on routine oncologic follow-up imaging.       XR Surgery CONNER Fluoro Less Than 5 Min w Stills    Narrative     SURGERY C-ARM FLUOROSCOPY LESS THAN FIVE MINUTES WITH STILLS   10/11/2023 11:50 AM     HISTORY:  Embolectomy right leg, c-arm #6.    COMPARISON: None.      Impression    IMPRESSION: A total of 8 spot fluoroscopic images obtained  intraoperatively during right leg thrombectomy. Bypass graft appears  partially patent, though limited visualization. Limited visualization  overlying the lower leg. Please see operative report for details.  Total fluoroscopic time is 1.3 minutes with a total of eight spot  fluoroscopic images.    NICOLAS HOPSON MD         SYSTEM ID:  [id number]   Echocardiogram Complete     Value    LVEF  30-35%    Narrative    [id number]  [id number]  [id number]  [id number]^IRVIN^NICK     [facility]  Echocardiography Laboratory  [address] [place] [address]     Name: KASIA WAN  MRN: [id number]  : 1960  Study Date: 10/03/2023 11:06 AM  Age: 62 yrs  Gender: Female  Patient Location: [facility]  Reason For Study: Acute Myocardial Infarction  Ordering Physician: NICK BRYAN  Referring Physician: Vasquez Benoit  Performed By: Radha Mcfadden RDCS     BSA: 1.5 m2  Height: 63 in  Weight: 115 lb  HR: 80  BP: 108/96 mmHg  ______________________________________________________________________________  Procedure  Complete Portable Echo Adult. Optison (NDC #6181-5489) given intravenously.  Technically difficult study.  ______________________________________________________________________________  Interpretation Summary     Technically difficult study with some improvement after contrast use.     Normal LV size and wall thickness with severely reduced LV systolic function.  Visually estimated LVEF is around 30 to 35%.  Mid to distal portions of the left ventricle are completely akinetic to  severely hypokinetic. Basal segment contractility seems preserved. Findings  likely suggest stress-induced cardiomyopathy.  Normal RV size and systolic function.  No hemodynamically significant  valve disease.  IVC is normal in size and collapsible.  ______________________________________________________________________________  Left Ventricle  The left ventricle is normal in size. There is normal left ventricular wall  thickness. The visual ejection fraction is 30-35%. Diastolic Doppler findings  (E/E' ratio and/or other parameters) suggest left ventricular filling  pressures are increased.     Right Ventricle  The right ventricle is normal in size and function.     Atria  Normal left atrial size. Right atrial size is normal.     Mitral Valve  There is mild mitral annular calcification. There is trace mitral  regurgitation. There is no mitral valve stenosis.     Tricuspid Valve  The tricuspid valve is not well visualized, but is grossly normal. Right  ventricular systolic pressure could not be approximated due to inadequate  tricuspid regurgitation.     Aortic Valve  The aortic valve is not well visualized. The aortic valve is trileaflet with  aortic valve sclerosis. No aortic regurgitation is present. No hemodynamically  significant valvular aortic stenosis.     Pulmonic Valve  The pulmonic valve is not well visualized.     Vessels  The aortic root is not well visualized. The aortic root is normal size. The  inferior vena cava was normal in size with preserved respiratory variability.     Pericardium  There is no pericardial effusion.     ______________________________________________________________________________  MMode/2D Measurements & Calculations  IVSd: 0.92 cm  LVIDd: 4.0 cm  LVIDs: 2.6 cm  LVPWd: 0.86 cm  FS: 36.9 %  LV mass(C)d: 110.4 grams  LV mass(C)dI: 72.2 grams/m2     Ao root diam: 3.0 cm  Ao root diam index Ht(cm/m): 1.9  Ao root diam index BSA (cm/m2): 2.0  RWT: 0.42     Doppler Measurements & Calculations  MV E max perfecto: 99.4 cm/sec  MV A max perfecto: 118.0 cm/sec  MV E/A: 0.84  MV dec time: 0.12 sec  E/E' av.2  Lateral E/e': 15.0  Medial E/e': 19.4  RV S Perfecto: 12.0 cm/sec      ______________________________________________________________________________  Report approved by: Cheryl Mcclelland 10/03/2023 01:01 PM         Cardiac Catheterization    Narrative      Prox RCA lesion is 45% stenosed.    RPAV-1 lesion is 30% stenosed.    RPAV-2 lesion is 50% stenosed.    1st Mrg lesion is 45% stenosed.    1st Diag lesion is 100% stenosed.    Dist Cx lesion is 55% stenosed.    Right dominant coronary anatomy.  Completely occluded (probably chronic) D1 with left to left collaterals   from distal LAD to D1.  Moderate coronary artery disease involving proximal to mid RCA which is   not hemodynamically significant-IFR negative.  Moderate coronary disease involving distal small-caliber RPL and distal   circumflex artery.       *Note: Due to a large number of results and/or encounters for the requested time period, some results have not been displayed. A complete set of results can be found in Results Review.       Discharge Medications   Current Discharge Medication List        START taking these medications    Details   empagliflozin (JARDIANCE) 10 MG TABS tablet Take 1 tablet (10 mg) by mouth daily  Qty: 90 tablet, Refills: 1    Associated Diagnoses: Type 2 diabetes mellitus with diabetic peripheral angiopathy without gangrene, without long-term current use of insulin (H)      ferrous sulfate (FEROSUL) 325 (65 Fe) MG tablet Take 1 tablet (325 mg) by mouth every other day  Qty: 60 tablet, Refills: 1    Associated Diagnoses: Atherosclerosis of bypass graft of right lower extremity with other clinical manifestation (H24)      nicotine (NICODERM CQ) 14 MG/24HR 24 hr patch Place 1 patch onto the skin daily  Qty: 30 patch, Refills: 1    Associated Diagnoses: Tobacco use disorder      polyethylene glycol (MIRALAX) 17 GM/Dose powder Take 17 g by mouth daily Hold for diarrhea or loose stools  Qty: 510 g, Refills: 0    Associated Diagnoses: Constipation, unspecified constipation type      rivaroxaban  ANTICOAGULANT (XARELTO) 15 MG TABS tablet Take 1 tablet (15 mg) by mouth 2 times daily (with meals)  Qty: 20 tablet, Refills: 0    Associated Diagnoses: Atherosclerosis of bypass graft of right lower extremity with other clinical manifestation (H24)      senna-docusate (SENOKOT-S/PERICOLACE) 8.6-50 MG tablet Take 1 tablet by mouth daily as needed for constipation  Qty: 30 tablet, Refills: 0    Associated Diagnoses: Atherosclerosis of bypass graft of right lower extremity with other clinical manifestation (H24)           CONTINUE these medications which have CHANGED    Details   carvedilol (COREG) 3.125 MG tablet Take 1 tablet (3.125 mg) by mouth 2 times daily (with meals)  Qty: 60 tablet, Refills: 0    Comments: Future refills by PCP Dr. Vasquez Benoit with phone number [phone].  Associated Diagnoses: Takotsubo cardiomyopathy; Coronary artery disease involving native coronary artery of native heart without angina pectoris      lisinopril (ZESTRIL) 5 MG tablet Take 1 tablet (5 mg) by mouth daily  Qty: 30 tablet, Refills: 0    Comments: Future refills by PCP Dr. Vasquez Benoit with phone number [phone].  Associated Diagnoses: Takotsubo cardiomyopathy; Coronary artery disease involving native coronary artery of native heart without angina pectoris           CONTINUE these medications which have NOT CHANGED    Details   acetaminophen (TYLENOL) 500 MG tablet Take 500-1,000 mg by mouth every 6 hours as needed for mild pain      augmented betamethasone dipropionate (DIPROLENE AF) 0.05 % external cream Apply topically 2 times daily as needed (ear itching)      BREO ELLIPTA 200-25 MCG/ACT inhaler Inhale 1 puff into the lungs daily  Qty: 120 each, Refills: 11    Associated Diagnoses: Chronic obstructive pulmonary disease, unspecified COPD type (H)      Calcium Carb-Cholecalciferol (CALCIUM CARBONATE-VITAMIN D3) 600-400 MG-UNIT TABS TAKE ONE TABLET BY MOUTH TWICE DAILY  Qty: 180 tablet, Refills: 3    Associated  Diagnoses: Osteoporosis, unspecified osteoporosis type, unspecified pathological fracture presence      diphenhydrAMINE (BENADRYL) 25 MG tablet take Benadryl 25-50 mg one hour prior to the procedure  Qty: 2 tablet, Refills: 0    Associated Diagnoses: Contrast media allergy      nitroGLYcerin (NITROSTAT) 0.4 MG sublingual tablet Place 1 tablet (0.4 mg) under the tongue See Admin Instructions for chest pain  Qty: 30 tablet, Refills: 11    Associated Diagnoses: Coronary artery disease involving native coronary artery of native heart without angina pectoris      omeprazole (PRILOSEC) 20 MG DR capsule TAKE ONE CAPSULE BY MOUTH DAILY  Qty: 90 capsule, Refills: 3    Associated Diagnoses: Gastroesophageal reflux disease with esophagitis without hemorrhage      triamcinolone (KENALOG) 0.1 % external ointment Apply topically 2 times daily as needed for irritation Apply to back      clopidogrel (PLAVIX) 75 MG tablet Take 1 tablet (75 mg) by mouth daily  Qty: 90 tablet, Refills: 3    Associated Diagnoses: Peripheral vascular disease (H24)      denosumab (PROLIA) 60 MG/ML SOLN injection Inject 1 mL (60 mg) Subcutaneous every 6 months INDICATION: TO TREAT OSTEOPOROSIS  Qty: 1 Syringe, Refills: 0    Associated Diagnoses: Osteoporosis without current pathological fracture, unspecified osteoporosis type      gabapentin (NEURONTIN) 100 MG capsule TAKE ONE CAPSULE BY MOUTH THREE TIMES DAILY  Qty: 90 capsule, Refills: 0    Associated Diagnoses: Critical lower limb ischemia (H)      rosuvastatin (CRESTOR) 40 MG tablet Take 1 tablet (40 mg) by mouth At Bedtime  Qty: 90 tablet, Refills: 3    Associated Diagnoses: Hyperlipidemia LDL goal <70           STOP taking these medications       aspirin (ASA) 81 MG chewable tablet Comments:   Reason for Stopping:             Allergies   Allergies   Allergen Reactions    Contrast Dye Anaphylaxis     RASH, FACIAL AND NECK SWELLING, SOB, WHEEZING    Pantoprazole      Protonix caused diffuse edema     Chantix [Varenicline]      Terrible dreams    Penicillins Itching

## 2023-10-16 ENCOUNTER — PATIENT OUTREACH (OUTPATIENT)
Dept: CARE COORDINATION | Facility: CLINIC | Age: 63
End: 2023-10-16
Payer: COMMERCIAL

## 2023-10-16 NOTE — PROGRESS NOTES
Clinic Care Coordination Contact  Community Health Worker Initial Outreach      Patient accepts CC: No, Pt declined needs for extra support.     Clinic Care Coordination Contact  [facility]: Post-Discharge Note  SITUATION                                                      Admission:    Admission Date: 10/03/23   Reason for Admission: Acute reaction to situational stress  Discharge:   Discharge Date: 10/14/23  Discharge Diagnosis: Acute reaction to situational stress    BACKGROUND                                                      Per hospital discharge summary and inpatient provider notes:    Shirley Hendricks is a 62 year old female with a PMH significant for COPD, GERD, hyperlipidemia, hypertension, CAD, peripheral vascular disease, Lupus, depression with anxiety, new diagnosis of Malignant B-cell lymphoma of R-neck admitted on 10/3/2023 presents with chest pain found to have NSTEMI vs Stress cardiomyopathy.  Admitted to the hospital for the following issues:        Chest Pain-resolved  NSTEMI vs Takotsubo Cardiomyopathy  HFrEF  Suspected syncope  Hyperlipemia, Hypertension  Hx of CAD and MI 2019    ASSESSMENT           Discharge Assessment  How are you doing now that you are home?: doing well  How are your symptoms? (Red Flag symptoms escalate to triage hotline per guidelines): Improved  Do you feel your condition is stable enough to be safe at home until your provider visit?: Yes  Does the patient have their discharge instructions? : Yes  Does the patient have questions regarding their discharge instructions? : No  Were you started on any new medications or were there changes to any of your previous medications? : Yes  Does the patient have all of their medications?: Yes  Do you have questions regarding any of your medications? : No  Do you have all of your needed medical supplies or equipment (DME)?  (i.e. oxygen tank, CPAP, cane, etc.): Yes  Discharge follow-up appointment scheduled within 14  calendar days? : Yes  Discharge Follow Up Appointment Date: 10/23/23  Discharge Follow Up Appointment Scheduled with?: Specialty Care Provider    Post-op ([facility] Only)  If the patient had a surgery or procedure, do they have any questions for a nurse?: No         PLAN                                                      Outpatient Plan:      Follow-ups Needed After Discharge  Follow-up Appointments     Follow-up and recommended labs and tests       Follow up with me,  Chadwick Lozano MD, within 2 weeks. to   evaluate after surgery.  The following labs/tests are recommended: Graft   duplex 3 months.      Future Appointments   Date Time Provider Department Center   10/23/2023 10:30 AM Kaylee Liu NP [facility]   11/7/2023  3:00 PM [id number]   11/21/2023  4:00 PM Mary Silva APRN CNP [facility]   3/28/2024  2:00 PM Jacoby Fischer MD [facility]         For any urgent concerns, please contact our 24 hour nurse triage line: [phone]     DEWAYNE Grayson  [phone]  [facility]

## 2023-10-17 ENCOUNTER — TELEPHONE (OUTPATIENT)
Dept: CARDIOLOGY | Facility: CLINIC | Age: 63
End: 2023-10-17
Payer: COMMERCIAL

## 2023-10-17 NOTE — TELEPHONE ENCOUNTER
Patient was admitted to [facility] on 10/3/23 with chest pain.    PMH: hypertension, hyperlipidemia, peripheral vascular disease, lupus, depression, anxiety, coronary artery disease, COPD, smoker, malignant B-cell lymphoma of the neck.    10/3/23: Echo showed EF of 30 to 35%. Mid to distal portions of the left ventricle are completely akinetic to severely hypokinetic. Basal segment contractility seems preserved. Findings likely suggest stress-induced cardiomyopathy. Normal RV size and systolic function. No hemodynamically significant valve disease.    10/4/23: Coronary angiogram via LRA showed completely occluded (probably chronic) D1 with left to left collaterals from distal LAD to D1. Moderate coronary artery disease involving proximal to mid RCA which is not hemodynamically significant-IFR negative. Moderate coronary disease involving distal small-caliber RPL and distal circumflex artery. Presentation most consistent with stress CM.      10/4/23: Acute right leg pain. Vascular surgery consulted, vascular doppler completed, and showed occluded graft from the distal femoral-popliteal bypass to the anterior tibial artery.    10/7/23: Emergent right femoral to anterior tibial thrombectomy, anterior tibial endarterectomy with vein patch angioplasty and femoral to anterior tibial bypass and dorsalis pedis thrombectomy.    Pt was started on Jardiance, Fe. PTA Zestril and Coreg dosages were decreased. ASA was discontinued at time of discharge.    Called patient to discuss any post hospital d/c questions, review discharge medication, and confirm f/u appts. Patient denied any questions regarding new or changes to PTA medications.     Patient denied any SOB, chest pain, edema, or light headedness.    LRA access site is healing without signs of infection, swelling or bleeding.    RN confirmed with patient that he is scheduled for an echo on 11/7/23 at 1500, and an OV on 11/21/23 at 1550 with BRANDON Mary Silva at our [place]  [facility]. Dr. Huang's Team RN phone number.    Instructed patient to weigh self every AM, after waking and using the restroom, but before breakfast and medications. Call clinic for a weight gain of 2 lbs overnight or 5 lbs in a week. Low Na+ diet encouraged. Pt instructed to bring daily wt/BP diary and medications with to f/u OV.     Patient advised to call clinic with any cardiac related questions or concerns prior to his appt. Patient verbalized understanding and agreed with plan. YULIA Robins RN.

## 2023-10-23 ENCOUNTER — OFFICE VISIT (OUTPATIENT)
Dept: OTHER | Facility: CLINIC | Age: 63
End: 2023-10-23
Payer: COMMERCIAL

## 2023-10-23 ENCOUNTER — TELEPHONE (OUTPATIENT)
Dept: OTHER | Facility: CLINIC | Age: 63
End: 2023-10-23

## 2023-10-23 VITALS — HEART RATE: 60 BPM | DIASTOLIC BLOOD PRESSURE: 82 MMHG | OXYGEN SATURATION: 99 % | SYSTOLIC BLOOD PRESSURE: 161 MMHG

## 2023-10-23 DIAGNOSIS — T82.898A: ICD-10-CM

## 2023-10-23 DIAGNOSIS — L03.115 CELLULITIS OF RIGHT LOWER EXTREMITY: Primary | ICD-10-CM

## 2023-10-23 PROCEDURE — 99204 OFFICE O/P NEW MOD 45 MIN: CPT

## 2023-10-23 PROCEDURE — 99213 OFFICE O/P EST LOW 20 MIN: CPT

## 2023-10-23 PROCEDURE — G0463 HOSPITAL OUTPT CLINIC VISIT: HCPCS

## 2023-10-23 RX ORDER — CEPHALEXIN 500 MG/1
500 CAPSULE ORAL 2 TIMES DAILY
Qty: 20 CAPSULE | Refills: 0 | Status: SHIPPED | OUTPATIENT
Start: 2023-10-23 | End: 2023-11-02

## 2023-10-23 NOTE — PROGRESS NOTES
VASCULAR SURGERY PROGRESS NOTE    LOCATION: [facility]  Shirley Hendricks  Medical Record #:  [id number]  YOB: 1960  Age:  63 year old     Date of Service: 10/23/2023    PRIMARY CARE PROVIDER: Vasquez Benoit    Reason for visit:  post-operative appointment    Shirley Hendricks is a 63 year old female who has extensive vascular history with most recently undergoing a right femoral to anterior tibial PTFE bypass graft with single vessel runoff on 10/11 followed by embolectomy of her graft on 10/13. Given recent occlusions and comorbidities will be on Xarelto and Plavix chronically, in     Today she comes in for previously arranged post-operative visit. She denies pain or claudication of her right leg. 3+ graft pulse noted with palpable DP and AT. Her foot incision is fully healed, however concern for cellulitis from her calf incision. Groin incision is fully healed. Calf incision is warm to touch, drainage noted, erythematous, and edematous, Shirley reports these symptoms started 2 days after discharge, have not worsened. She denies fevers, chills.         RECOMMENDATION/RISKS: Given concern for cellulitis over graft, started on antibiotics, sent to pharmacy in addition to refill her Xarelto. She will be on 15 mg BID until 11/3 before transitioning over to Xarelto 20 mg once a day. Will see her on 11/3 for removal of calf sutures. At that time we will schedule her imaging. She should continue her medication regimen.     REVIEW OF SYSTEMS:    A 12 point ROS was reviewed and is negative except for what is listed above in HPI.    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 clot    Post-menopausal     Reflux esophagitis 02/2004    EGD: esophagitis and medium HH    SBO (small bowel obstruction) (H) 08/10/2021    SVT (supraventricular tachycardia)     Thrombocytopenia (H24)     Uncomplicated asthma     Vitamin C deficiency 08/12/2018          Past Surgical History:   Procedure Laterality Date    ANGIOGRAM      ANGIOGRAM Right 6/23/2021    Procedure: RIGHT LOWER EXTREMITY ANGIOGRAM WITH LEFT BRACHIAL ARTERY CUTDOWN;  Surgeon: José Luis Hernandez MD;  Location: [facility] OR    BIOPSY MASS NECK Right 10/11/2023    Procedure: Right Parotid Biopsy;  Surgeon: Randal Mendoza MD;  Location: [facility]    BYPASS GRAFT FEMOROPOPLITEAL Right 09/23/2020    Procedure: RIGHT FEMORAL GRAFT TO ABOVE-KNEE POPLITEAL BYPASS USING CADAVERIC SUPERFICIAL FEMORAL ARTERY;  Surgeon: Chadwick Lozano MD;  Location: [facility]    BYPASS GRAFT FEMOROPOPLITEAL Right 2/15/2022    Procedure: RIGHT SUPERFICIAL FEMORAL ARTERY GRAFT TO BELOW KNEE POPLITEAL BYPASS WITH CADAVERIC CRYOLIFE  FEMORAL-POPLITEAL ARTERY SIZE: OUTER DIAMETER: 7MM - 6MM;  Surgeon: Chadwick Lozano;  Location: [facility]    BYPASS GRAFT FEMOROPOPLITEAL Right 5/26/2023    Procedure: RIGHT DISTAL SUPERFICIAL FEMORAL GRAFT TO ANTERIOR TIBIAL ARTERY WITH 26 CENTIMETER CADAVERIC SUPERFICIAL FEMORAL ARTERY GRAFT;  Surgeon: Chadwick Lozano MD;  Location: [facility]    BYPASS GRAFT FEMOROPOPLITEAL Right 10/11/2023    Procedure: RIGHT FEMORAL TO POPLITEAL GRAFT THROMBECTOMY;  Surgeon: Blake  Chadwick Osman MD;  Location:  OR    BYPASS GRAFT INSITU FEMOROPOPLITEAL Right 7/7/2021    Procedure: CREATION RIGHT FEMORAL TO POPLITEAL ARTERIAL BYPASS USING INSITU VEIN GRAFT;  Surgeon: José Luis Hernandez MD;  Location:  OR    CARDIAC CATHERIZATION  09/03/2009    multivessel CAD without target lesions, med mgmt indicated, preserved ef    CARPAL TUNNEL RELEASE RT/LT Right 05/20/2021    CV CORONARY ANGIOGRAM N/A 10/4/2023    Procedure: Coronary Angiogram;  Surgeon: Rogelio Ricks MD;  Location:  [facility]    CV PCI N/A 10/4/2023    Procedure: Percutaneous Coronary Intervention;  Surgeon: Rogelio Ricks MD;  Location:  [facility]    EMBOLECTOMY LOWER EXTREMITY Right 10/6/2023    Procedure: Right anterior tibial bypass with graft, Right tibial endarterectomy,thrombectomy, Right doraslis pedis thrombectomy, Anterior Tibial vein patch.;  Surgeon: Chadwick Lozano MD;  Location:  OR    ENDARTERECTOMY CAROTID Right 05/11/2016    Procedure: ENDARTERECTOMY CAROTID;  Surgeon: Chadwick Lozano MD;  Location:  OR    ENDARTERECTOMY CAROTID Left 06/08/2020    Procedure: LEFT CAROTID ENDARTERECTOMY with distal facal vein patch  and EEG;  Surgeon: Chadwick Lozano MD;  Location:  OR    FINE NEEDLE ASPIRATION WITHOUT IMAGING GUIDANCE Right 9/22/2023    Procedure: Fine needle aspiration without imaging guidance;  Surgeon: Kiersten Aguilera MD;  Location: [facility]    FLUORESCENCE INTRAOPERATIVE VASCULAR ANGIOGRAPHY Right 12/28/2022    Procedure: RIGHT LEG ANGIOGRAM with intervention;  Surgeon: Chadwick Lozano MD;  Location:  OR    GYN SURGERY  left tube    left salpingectomy    IR ANGIOGRAM THROUGH CATHETER (ARTERIAL)  10/6/2023    IR ANGIOGRAM THROUGH CATHETER (ARTERIAL)  10/6/2023    IR LOWER EXTREMITY ANGIOGRAM RIGHT  05/25/2021    IR LOWER EXTREMITY ANGIOGRAM RIGHT  10/5/2023    IR OR ANGIOGRAM  6/23/2021    IR OR ANGIOGRAM  12/28/2022     LAPAROSCOPIC CHOLECYSTECTOMY N/A 09/27/2017    Procedure: LAPAROSCOPIC CHOLECYSTECTOMY;  LAPAROSCOPIC CHOLECYSTECTOMY;  Surgeon: Jacoby Tapia MD;  Location:  [facility]    LAPAROSCOPY DIAGNOSTIC (GENERAL) N/A 8/11/2021    Procedure: Exploratory laparoscopy,  laparoscopic lysis of adhesions, laparotomy;  Surgeon: Corina Ferreira MD;  Location:  OR    OR ANGIOGRAM, LOWER EXTREMITY Right 10/11/2023    Procedure: Or Angiogram, Lower Extremity;  Surgeon: Chadwick Lozano MD;  Location:  OR    ORTHOPEDIC SURGERY      left knee surgery    REPAIR ANEURYSM FEMORAL ARTERY Left 12/28/2022    Procedure: REPAIR LEFT FEMORAL PSEUDOANEURYSM;  Surgeon: Chadwick Lozano MD;  Location:  OR    VASCULAR SURGERY  aoto bi fem  left fem-AK pop    [facility] FABRIC WRAPPING OF ABDOMINAL ANEURYSM      [facility] NONSPECIFIC PROCEDURE  12/2000    angioplasty with stent right fem. a.    [facility] NONSPECIFIC PROCEDURE  1987    sinus surgery    [facility] NONSPECIFIC PROCEDURE  09/02/2009    Emergent left groin exploration with oversewing of bleeding angiographic site. 2. Endarterectomy of common femoral-proximal superficial femoral artery with greater saphenous vein patch angioplasty.   a. [place] of accessory right greater saphenous vein.     [facility] NONSPECIFIC PROCEDURE  08/27/2009    occluded left common iliac and external iliac arteries were successfully revascularized with stenting to 8 and 7 mm        ALLERGIES:  Contrast dye, Pantoprazole, Chantix [varenicline], and Penicillins    MEDS:    Current Outpatient Medications:     acetaminophen (TYLENOL) 500 MG tablet, Take 500-1,000 mg by mouth every 6 hours as needed for mild pain, Disp: , Rfl:     augmented betamethasone dipropionate (DIPROLENE AF) 0.05 % external cream, Apply topically 2 times daily as needed (ear itching), Disp: , Rfl:     BREO ELLIPTA 200-25 MCG/ACT inhaler, Inhale 1 puff into the lungs daily, Disp: 120 each, Rfl: 11    Calcium Carb-Cholecalciferol (CALCIUM  CARBONATE-VITAMIN D3) 600-400 MG-UNIT TABS, TAKE ONE TABLET BY MOUTH TWICE DAILY, Disp: 180 tablet, Rfl: 3    carvedilol (COREG) 3.125 MG tablet, Take 1 tablet (3.125 mg) by mouth 2 times daily (with meals), Disp: 60 tablet, Rfl: 0    cephALEXin (KEFLEX) 500 MG capsule, Take 1 capsule (500 mg) by mouth 2 times daily for 10 days, Disp: 20 capsule, Rfl: 0    clopidogrel (PLAVIX) 75 MG tablet, Take 1 tablet (75 mg) by mouth daily, Disp: 90 tablet, Rfl: 3    denosumab (PROLIA) 60 MG/ML SOLN injection, Inject 1 mL (60 mg) Subcutaneous every 6 months INDICATION: TO TREAT OSTEOPOROSIS, Disp: 1 Syringe, Rfl: 0    diphenhydrAMINE (BENADRYL) 25 MG tablet, take Benadryl 25-50 mg one hour prior to the procedure, Disp: 2 tablet, Rfl: 0    empagliflozin (JARDIANCE) 10 MG TABS tablet, Take 1 tablet (10 mg) by mouth daily, Disp: 90 tablet, Rfl: 1    ferrous sulfate (FEROSUL) 325 (65 Fe) MG tablet, Take 1 tablet (325 mg) by mouth every other day, Disp: 60 tablet, Rfl: 1    gabapentin (NEURONTIN) 100 MG capsule, TAKE ONE CAPSULE BY MOUTH THREE TIMES DAILY, Disp: 90 capsule, Rfl: 0    lisinopril (ZESTRIL) 5 MG tablet, Take 1 tablet (5 mg) by mouth daily, Disp: 30 tablet, Rfl: 0    nicotine (NICODERM CQ) 14 MG/24HR 24 hr patch, Place 1 patch onto the skin daily, Disp: 30 patch, Rfl: 1    nitroGLYcerin (NITROSTAT) 0.4 MG sublingual tablet, Place 1 tablet (0.4 mg) under the tongue See Admin Instructions for chest pain, Disp: 30 tablet, Rfl: 11    omeprazole (PRILOSEC) 20 MG DR capsule, TAKE ONE CAPSULE BY MOUTH DAILY, Disp: 90 capsule, Rfl: 3    polyethylene glycol (MIRALAX) 17 GM/Dose powder, Take 17 g by mouth daily Hold for diarrhea or loose stools, Disp: 510 g, Rfl: 0    rivaroxaban ANTICOAGULANT (XARELTO) 15 MG TABS tablet, Take 1 tablet (15 mg) by mouth 2 times daily (with meals), Disp: 20 tablet, Rfl: 0    rosuvastatin (CRESTOR) 40 MG tablet, Take 1 tablet (40 mg) by mouth At Bedtime, Disp: 90 tablet, Rfl: 3    senna-docusate  (SENOKOT-S/PERICOLACE) 8.6-50 MG tablet, Take 1 tablet by mouth daily as needed for constipation, Disp: 30 tablet, Rfl: 0    triamcinolone (KENALOG) 0.1 % external ointment, Apply topically 2 times daily as needed for irritation Apply to back, Disp: , Rfl:     SOCIAL HABITS:    History   Smoking Status    Every Day    Packs/day: 0.50    Years: 52.00    Types: Cigarettes    Start date: 1968   Smokeless Tobacco    Never     Social History    Substance and Sexual Activity      Alcohol use: Not Currently        Comment: x1-2 yr      History   Drug Use Unknown     Comment: 2x per day       FAMILY HISTORY:    Family History   Problem Relation Age of Onset    Cancer Mother         bladder    Cardiovascular Father         alive,multiple heart attacks    Diabetes Maternal Grandmother     Lung Cancer Maternal Grandmother     Blood Disease Brother         clotting disorder       PE:  BP (!) 161/82 (BP Location: Right arm, Patient Position: Chair, Cuff Size: Adult Regular)   Pulse 60   LMP  (LMP Unknown)   SpO2 99%   Wt Readings from Last 1 Encounters:   10/11/23 115 lb 11.2 oz (52.5 kg)     There is no height or weight on file to calculate BMI.    EXAM:  GENERAL: well-developed 63 year old female who appears her stated age  CARDIAC: normal   CHEST/LUNG: normal respiratory effort   MUSCULOSKELETAL: grossly normal and both lower extremities are intact, no lower extremity edema  NEUROLOGIC: focally intact, alert and oriented x 3  PSYCH: appropriate affect  VASCULAR:       DIAGNOSTIC STUDIES:     Images:  XR Foot Lt 3+ Views    Result Date: 10/19/2023  COMPARISON:  None. FINDINGS:  Diffuse osteopenia limits the sensitivity of the exam particularly for subtle fractures. No acute fractures or dislocations. If there is clinical concern for an occult fracture, consider immobilization and follow-up radiographs in 10-14 days. Degenerative arthrosis of the second, third and fourth tarsometatarsal joints. Degenerative arthrosis of the  interphalangeal joints of the toes. Small Achilles calcaneal enthesophyte. No erosive changes.    XR Surgery CONNER Fluoro Less Than 5 Min w Stills    Result Date: 10/11/2023  SURGERY C-ARM FLUOROSCOPY LESS THAN FIVE MINUTES WITH STILLS 10/11/2023 11:50 AM HISTORY:  Embolectomy right leg, c-arm #6. COMPARISON: None.     IMPRESSION: A total of 8 spot fluoroscopic images obtained intraoperatively during right leg thrombectomy. Bypass graft appears partially patent, though limited visualization. Limited visualization overlying the lower leg. Please see operative report for details. Total fluoroscopic time is 1.3 minutes with a total of eight spot fluoroscopic images. NICOLAS HOPSON MD   SYSTEM ID:  [id number]    IR Angiogram through catheter (arterial)    Result Date: 10/9/2023  INTERVENTIONAL RADIOLOGY ANGIOGRAM THROUGH CATHETER (ARTERIAL) 10/6/2023 5:47 PM HISTORY:  62-year-old patient with continued thrombolysis treatment. Patient was evaluated earlier in the day with emboli extending to the foot with limited outflow. Therefore, infusion catheter was to remain in place with additional TPA administration. Patient also had considerable spasm of the anterior tibial artery. Patient returns for evaluation. TECHNIQUE: Patient was brought to interventional radiology department and the right groin sheath and catheter were prepped and draped in standard sterile fashion. 1% lidocaine was used for local anesthesia. Angiogram was performed through the infusion catheter which is noted throughout the length of the more proximal bypass as well as the more distal bypass. The bypass grafts are patent, as is the anterior tibial artery with good blood flow through the dorsalis pedis artery, returned to baseline. Therefore, the infusion catheter was removed. Access site is antegrade within the proximal bypass graft, therefore closure device is not felt to be appropriate. Therefore, sheath removed after TPA and heparin had been held for 30  minutes and hemostasis achieved with manual compression over a total of 60 minutes. During this entire time of compression, pulses were present throughout the graft as well as the dorsalis pedis artery which were checked frequently, greater than every 5 minutes. The patient tolerated this segment of the procedure well. Patient had been treated for contrast allergy with multiple rounds of steroids earlier in the day. Sedation: None Sedation time: 14 minutes Fluoroscopic time: 0.5 minutes Air kerma: 4.83 mGy Contrast: 20 mL of Visipaque administered intra-arterially without complication. FINDINGS: A total of five angiogram sequences obtained throughout the procedure. Patient has a proximal femoral to above-knee popliteal surgical bypass graft which is patent. Patient has an additional bypass graft arising from the distal aspect of this graft to the anterior tibial artery. These segments are patent with washout, previously noted to be stagnant. Anterior tibial artery is patent and not spasmed. Dorsalis pedis artery is patent with retrograde filling of the distal posterior tibial artery, returned to baseline.     IMPRESSION: Completion of thrombolysis with patency of right lower extremity bypass grafts and single vessel runoff via the anterior tibial artery. Blood flow appears restored to baseline in the foot with blood flow via the dorsalis pedis artery and retrograde filling of the distal posterior tibial artery. Patient not a candidate for closure device, therefore hemostasis was achieved with manual compression for 60 minutes. Pulses were maintained within the graft and dorsalis pedis artery throughout this time and upon patient's return to the ICU. NICOLAS HOPSON MD   SYSTEM ID:  [id number]    IR Angiogram through catheter (arterial)    Result Date: 10/9/2023  INTERVENTIONAL RADIOLOGY ANGIOGRAM THROUGH CATHETER (ARTERIAL) 10/6/2023 11:42 AM HISTORY:  62-year-old woman with history of right lower extremity bypass  grafts, patient initially had a femoral to above-knee popliteal and subsequently an additional graft from the distal aspect of this graft to the anterior tibial artery. Patient has undergone thrombolysis for approximately 20 hours, request made for evaluation. TECHNIQUE: Patient was brought to interventional radiology department and informed consent reiterated. Patient was placed in a supine position. Skin overlying the right groin where an antegrade puncture was made was prepped and draped in standard sterile fashion. 1% lidocaine was used for local anesthesia. An angiogram was performed through the infusion catheter and blood flow is noted throughout the distal aspect of the graft. Angiogram images were performed demonstrating this distal graft as patent. However, it is noted that a long sheath had been placed from the access site to the mid to distal SFA. Wire was placed through the infusion catheter and the catheter was removed. Angiogram was then performed through the sheath and no blood flow was noted through the graft. Thrombus had been present along the sheath and dislodged to the distal aspect of the graft with now static blood flow. This was recognized and the long sheath was exchanged for a short 6 [place] sheath. Angiogram was then performed demonstrating stagnant blood flow. The anterior tibial artery noted to be in spasm. Therefore, periodically throughout the remainder of the exam a total of 1350 mcg of nitroglycerin was administered into the anterior tibial artery as well as the dorsalis pedis artery. Total of 6 mg of TPA was administered in the foot, anterior tibial artery, and portions of the graft. An 018 wire was placed into the anterior tibial artery and 3 mm balloon angioplasty performed in the distal anastomosis. Possis device was used for mechanical thrombectomy throughout the graft with resolution of thrombus within the graft and the anterior tibial artery, though essentially no outflow in  the foot. More nitroglycerin and TPA were administered throughout these segments. No restoration of antegrade blood flow was identified. Therefore, a 50 cm infusion length catheter was placed throughout the bypass grafts. Patient did have a palpable pulse in the graft and the dorsalis pedis artery, though suspect this is a somewhat waterhammer pulse as due to limited outflow. Plan will be continued TPA at 0.5 mg/hour through the infusion catheter and heparin through the short sheath at 500 units/hour. Visual return the following day for repeat angiogram. Sedation: A moderate level of sedation was achieved with 6 mg IV Versed and 100 mcg IV fentanyl. 6 mg TPA administered directly into the graft and arterial system as well as 1350 mcg of nitroglycerin administered into the arteries, especially the anterior tibial and dorsalis pedis arteries. Sedation time: 104 minutes Please note the above medications were administered by interventional radiology staff under my direct supervision. Patient's vital signs were monitored and remained stable throughout the procedure. Fluoroscopic time: 22.5 minutes Air kerma: 50 mGy Contrast: 160 mL of Visipaque administered intra-arterially without complication. Local anesthetic: 10 mL 1% lidocaine. FINDINGS: A total of 36 angiogram sequences and spot fluoroscopic images obtained throughout the procedure. Initial angiogram through the infusion catheter demonstrates patency of the distal graft as well as the anterior tibial artery with mild stenosis at the distal anastomosis. Once the infusion catheter was exchanged, suspect thrombus noted along the portion of the catheter without side holes and along the sheath, which was dislodged and without further antegrade blood flow. Blood flow is now statement throughout both the proximal and distal grafts. Additionally, the entire anterior tibial artery spasmed. TPA and nitroglycerin was administered as well as 3 mm balloon angioplasty at the  distal anastomosis. There is periodic improvement, though overall relatively stasis of blood flow. Thrombus is noted forming in these segments and mechanical thrombectomy performed with a Possis device. At completion, the graft is patent, though essentially without outflow both related to spasm of the anterior tibial artery and limited outflow. Angiogram was performed in the dorsalis pedis artery with small filling defect, treated with TPA and nitroglycerin. An infusion catheter was placed throughout the grafts at completion for continued TPA administration. Interestingly, the patient had palpable pulse in the graft and dorsalis pedis artery, though suspect waterhammer pulse given limited outflow. Patient will return the following day.     IMPRESSION: 1. Thrombus dislodged from more proximal aspect and extended distally to the graft, anterior tibial artery and foot. Remaining efforts were made in effort to establish adequate outflow, especially in the foot. 6 mg of TPA and 1350 mcg of nitroglycerin administered into the foot and anterior tibial artery. 2. Bypass segments appear patent at completion, though stagnant blood flow. Therefore 50 cm infusion length catheter will remain in place with TPA administration at 0.5 mg/hour. Patient will return the following day. 3. 3 mm balloon angioplasty at the distal anastomosis of the anterior tibial artery. 4. Long 6 Spanish sheath was exchanged for a short 6 Spanish sheath. 5. First time mechanical thrombectomy performed during this hospitalization with Possis device throughout the tandem bypass grafts. NICOLAS HOPSON MD   SYSTEM ID:  [id number]    CT Chest Abdomen Pelvis w/o Contrast    Result Date: 10/7/2023  EXAM: CT CHEST, ABDOMEN, PELVIS WITHOUT CONTRAST LOCATION: [facility] DATE: 10/07/2023 INDICATION: Lymphoma staging. COMPARISON: None. TECHNIQUE: CT scan of the chest, abdomen, and pelvis was performed without IV contrast. Multiplanar reformats  were obtained. Dose reduction techniques were used. CONTRAST: None. FINDINGS: LUNGS AND PLEURA: Tiny benign fissural nodule on the left image 152. Additional fissural nodules along the minor fissure likely represent nonenlarged intrapulmonary lymph nodes. 3 mm subpleural nodule right lower lobe image 220 probably an additional nonenlarged intrapulmonary lymph node. Benign granulomatous change. No effusions. MEDIASTINUM/AXILLAE: No adenopathy demonstrated in the chest in the absence of contrast. No aneurysm. CORONARY ARTERY CALCIFICATION: Moderate. HEPATOBILIARY: Cholecystectomy. Unremarkable liver. PANCREAS: No significant mass, duct dilatation, or inflammatory change. SPLEEN: Normal size. ADRENAL GLANDS: No significant nodules. KIDNEYS/BLADDER: Left renal atrophy. Persistent nephrogram on the right, question renal injury. BOWEL: No obstruction or inflammatory change. LYMPH NODES: No gross abdominopelvic adenopathy in the absence of contrast. VASCULATURE: There are extensive atherosclerotic changes of the visualized aorta and its branches. There is no evidence of aortic dissection or aneurysm. Aortobifem bypass change. PELVIC ORGANS: No pelvic masses. MUSCULOSKELETAL: No frankly destructive bony lesions.     IMPRESSION: 1.  No adenopathy demonstrated in the chest, abdomen, and pelvis in the absence of contrast. 2.  Persistent nephrogram on the right and left renal atrophy, question acute renal injury. 3.  A few scattered fissural nodules and subpleural nodules are noted in the lungs, likely nonenlarged intrapulmonary lymph nodes. These can be reevaluated on routine oncologic follow-up imaging.     CT Soft Tissue Neck w/o Contrast    Result Date: 10/7/2023  EXAM: CT SOFT TISSUE NECK W/O CONTRAST LOCATION: [facility] DATE: 10/7/2023 INDICATION: Lymphoma staging COMPARISON: CT head 08/02/2023 TECHNIQUE: Routine CT Soft Tissue Neck without IV contrast. Multiplanar reformats. Dose reduction  techniques were used. FINDINGS: MUCOSAL SPACES/SOFT TISSUES: Normal mucosal spaces of the upper aerodigestive tract within the limits of noncontrast imaging. No definite mucosal mass or inflammation identified. Normal vocal cords and infraglottic trachea. Normal parapharyngeal space and subcutaneous soft tissues. Normal oral cavity,  spaces, and floor of mouth structures. LYMPH NODES: Interval decrease in size of a right level 1B lymph node, currently measuring 0.8 x 1.2 cm (series 3, image 58), previously measuring 1.2 x 1.7 cm. A left level 1B lymph node currently measures 0.5 x 0.6 cm (series 3, image 60), previously measuring 0.6 x 0.8 cm. SALIVARY GLANDS: Normal parotid and submandibular glands. THYROID: Unchanged 0.7 x 0.9 cm low-density nodule in the right thyroid lobe. VISUALIZED INTRACRANIAL/ORBITS/SINUSES: No abnormality of the visualized intracranial compartment or orbits. Visualized paranasal sinuses and mastoid air cells are clear. OTHER: No destructive osseous lesion. Please see same day CT chest pelvis for pulmonary findings.     IMPRESSION: 1.  When compared to 08/02/2023, there has been interval decrease in size of the previously present enlarged right level 1B lymph node, currently measuring 0.8 x 1.2 cm, previously measuring 1.2 x 1.7 cm. A left level 1B lymph node has also decreased in size, currently measuring 0.5 x 0.6 cm, previously measuring 0.6 x 0.8 cm.    IR Lower Extremity Angiogram Right    Addendum Date: 10/5/2023    ADDENDUM: Indication should read as follows:  62-year-old female with extensive vascular surgery history including aortobifemoral bypass, right femoral-popliteal bypass, and right lower extremity jump graft from the fem-pop bypass to anterior tibial artery. Patient admitted for concerns of NSTEMI and acute limb ischemia with absent distal right lower extremity pulses. US/Doppler confirmed complete occlusion of the right lower extremity jump graft, anterior tibial,  and posterior tibial arteries. IR consulted for angiogram and possible intervention. SHARMAINE CARLSON MD   SYSTEM ID:  [id number]    Result Date: 10/5/2023  [place] [facility] LOCATION: [facility] DATE: 10/5/2023 PROCEDURE: 1. US-GUIDED VASCULAR ACCESS 2. RIGHT LOWER EXTREMITY ANGIOGRAPHY 3. RIGHT LOWER EXTREMITY ARTERIAL CATHTER DIRECTED THROMBOLYSIS INITIATION 4. MODERATE SEDATION INTERVENTIONAL RADIOLOGIST:  Sharmaine Carlson MD INDICATION: 62-year-old female with stent. CONSENT: The risks, benefits and alternatives of dialysis arteriovenous shunt evaluation with possible angioplasty, stent placement, thrombolysis and/or tunneled dialysis catheter placement were discussed with the patient in detail. All questions were answered. Informed consent was given to proceed with the procedure. MODERATE SEDATION: Versed 2.5 mg IV; Fentanyl 125 mcg IV.  Under physician supervision, Versed and fentanyl were administered for moderate sedation. Pulse oximetry, heart rate and blood pressure were continuously monitored by an independent trained observer. The physician spent 60 minutes of face-to-face sedation time with the patient. CONTRAST: 45 mL Visipaque intravascular. ANTIBIOTICS: None. ADDITIONAL MEDICATIONS: None. FLUOROSCOPIC TIME: 10.0 minutes. RADIATION DOSE: Air Kerma: 119 mGy. COMPLICATIONS: No immediate complications. STERILE BARRIER TECHNIQUE: Maximum sterile barrier technique was used. Cutaneous antisepsis was performed at the operative site with application of 2% chlorhexidine and large sterile drape. Prior to the procedure, the  and assistant performed hand hygiene and wore hat, mask, sterile gown, and sterile gloves during the entire procedure. PROCEDURE: The patient was positioned supine on the fluoroscopic table and the bilateral groins were prepped and draped in usual sterile fashion. 1% lidocaine was used for local anesthesia. Using ultrasound guidance, the right common femoral artery was  accessed just proximal to the femoral-popliteal bypass proximal origin. Permanent ultrasound and fluoroscopic images of access site were obtained. Access was upgraded for a 6 [place]alan by 30 cm vascular sheath which was advanced over a wire into the proximal femoral-popliteal bypass graft. Right lower extremity angiography was performed in multiple stations through the arterial sheath. Following these results, an angled catheter and Glidewire were used to cross into the occluded jump graft. Gentle contrast injection was performed to confirm intraluminal position with spot fluoroscopic image obtained. Presumed site of jump graft distal anastomosis was briefly attempted to be crossed without success. Over a wire, a 100 cm total length 30 cm infusion length infusion length Michael Tommy catheter was positioned across the jump graft and guidewire was removed. Spot fluoroscopic images obtained to document final catheter and sheath positioning. The sheath was secured to the skin with a pursestring suture. A sterile dressing was applied in used to cover the external portions of the infusion catheter and sheath. TPA infusion via the infusion catheter was then initiated at a rate of 0.5 mg/hr. A heparin infusion was initiated through the vascular sheath at a rate of 500 units/hr. The patient tolerated the procedure well and left interventional radiology in stable condition. FINDINGS: 1. Right femoral ultrasound demonstrates patency of the common femoral artery and proximal femoral-popliteal bypass graft. 2. Right lower extremity angiography demonstrates widely patent femoral-popliteal bypass graft extending from the distal common femoral artery to the above-knee popliteal artery. Occlusion of the presumed femoral-popliteal bypass graft distal anastomosis at the popliteal artery, with associated prominent arterial collaterals. Known jump graft extending from the distal femoral-popliteal bypass graft to the anterior tibial  artery is occluded, with arterial stump is imminently representing the proximal anastomosis. 3. Following successful crossing into the jump graft, gentle contrast injection demonstrates thrombus throughout. 4. Spot fluoroscopic images document infusion catheter extending from the jump graft proximal anastomosis to just proximal to presumed site of distal jump graft anastomosis. The vascular sheath terminates in the mid-distal femoral-popliteal bypass graft.     IMPRESSION: Right lower extremity angiogram with catheter directed thrombolysis initiation across the occluded jump graft extending from the distal femoral-popliteal bypass to the anterior tibial artery. PLAN: The patient will be monitored in the ICU overnight. The patient will have the infusion continue during this time period with close monitoring for thrombolytic complications. The patient will return to IR on subsequent day for additional angiogram to evaluate for lysis progression and possible additional interventions. SHARMAINE BAR MD   SYSTEM ID:  [id number]    Cardiac Catheterization    Result Date: 10/4/2023    Prox RCA lesion is 45% stenosed.   RPAV-1 lesion is 30% stenosed.   RPAV-2 lesion is 50% stenosed.   1st Mrg lesion is 45% stenosed.   1st Diag lesion is 100% stenosed.   Dist Cx lesion is 55% stenosed. Right dominant coronary anatomy. Completely occluded (probably chronic) D1 with left to left collaterals from distal LAD to D1. Moderate coronary artery disease involving proximal to mid RCA which is not hemodynamically significant-IFR negative. Moderate coronary disease involving distal small-caliber RPL and distal circumflex artery.     US Lower Extremity Arterial Duplex Right    Result Date: 10/4/2023  US LOWER EXTREMITY ARTERIAL DUPLEX RIGHT PROCEDURE DATE: 10/4/2023 8:45 AM HISTORY:  Concern for occluded jump graft. Patient with a history of bilateral aortofemoral bypass graft, right-sided SFA to below knee popliteal artery bypass graft.  Subsequent jump graft from the distal femoropopliteal to the anterior tibial artery placed 2023. Concern for occlusion of the jump graft. COMPARISON: Lower extremity arterial ultrasound 8/10/2023 TECHNIQUE:  Duplex imaging is performed utilizing gray-scale, two-dimensional images and color-flow imaging. Doppler waveform analysis and spectral Doppler imaging is also performed. FINDINGS: Patency of the right femoropopliteal bypass with multiphasic waveforms. The distal anastomosis demonstrates focal velocity elevation and tortuosity. There is occlusion of the popliteal artery distal to the femoropopliteal graft anastomosis. The jump graft from the distal femoropopliteal bypass to the anterior tibial artery appears completely thrombosed beginning just distal to the anastomosis. The native popliteal artery and TP trunk appear chronically occluded. The native ISIDRO and DP are completely occluded.     IMPRESSION: 1.  Complete occlusion of the jump graft, ISIDRO, and DPA. The inflow femoropopliteal bypass graft is patent. 2.  Focal velocity elevation of the distal femoropopliteal graft anastomosis suggesting luminal stenosis, however this is distal to the origin of the jump graft. 3.  Redemonstration of chronic occlusion of the outflow popliteal artery and TP trunk distal to the femoropopliteal bypass graft. Dr. Vital discussed the case in person with Dr. Lozano. BERYL VITAL MD   SYSTEM ID:  [id number]    Echocardiogram Complete    Result Date: 10/3/2023  [id number]  [facility]chocardiography [facility] [address] [place] [address]  Name: KASIA WAN MRN: [id number] : 1960 Study Date: 10/03/2023 11:06 AM Age: 62 yrs Gender: Female Patient Location: [facility] Reason For Study: Acute Myocardial Infarction Ordering Physician: NICK BRYAN Referring Physician: Vasquez Benoit Performed By: Radah Mcfadden RDCS  BSA: 1.5 m2 Height: 63 in Weight:  115 lb HR: 80 BP: 108/96 mmHg ______________________________________________________________________________ Procedure Complete Portable Echo Adult. Optison (NDC #4207-7229) given intravenously. Technically difficult study. ______________________________________________________________________________ Interpretation Summary  Technically difficult study with some improvement after contrast use.  Normal LV size and wall thickness with severely reduced LV systolic function. Visually estimated LVEF is around 30 to 35%. Mid to distal portions of the left ventricle are completely akinetic to severely hypokinetic. Basal segment contractility seems preserved. Findings likely suggest stress-induced cardiomyopathy. Normal RV size and systolic function. No hemodynamically significant valve disease. IVC is normal in size and collapsible. ______________________________________________________________________________ Left Ventricle The left ventricle is normal in size. There is normal left ventricular wall thickness. The visual ejection fraction is 30-35%. Diastolic Doppler findings (E/E' ratio and/or other parameters) suggest left ventricular filling pressures are increased.  Right Ventricle The right ventricle is normal in size and function.  Atria Normal left atrial size. Right atrial size is normal.  Mitral Valve There is mild mitral annular calcification. There is trace mitral regurgitation. There is no mitral valve stenosis.  Tricuspid Valve The tricuspid valve is not well visualized, but is grossly normal. Right ventricular systolic pressure could not be approximated due to inadequate tricuspid regurgitation.  Aortic Valve The aortic valve is not well visualized. The aortic valve is trileaflet with aortic valve sclerosis. No aortic regurgitation is present. No hemodynamically significant valvular aortic stenosis.  Pulmonic Valve The pulmonic valve is not well visualized.  Vessels The aortic root is not well visualized. The  aortic root is normal size. The inferior vena cava was normal in size with preserved respiratory variability.  Pericardium There is no pericardial effusion.  ______________________________________________________________________________ MMode/2D Measurements & Calculations IVSd: 0.92 cm LVIDd: 4.0 cm LVIDs: 2.6 cm LVPWd: 0.86 cm FS: 36.9 % LV mass(C)d: 110.4 grams LV mass(C)dI: 72.2 grams/m2  Ao root diam: 3.0 cm Ao root diam index Ht(cm/m): 1.9 Ao root diam index BSA (cm/m2): 2.0 RWT: 0.42  Doppler Measurements & Calculations MV E max perfecto: 99.4 cm/sec MV A max perfecto: 118.0 cm/sec MV E/A: 0.84 MV dec time: 0.12 sec E/E' av.2 Lateral E/e': 15.0 Medial E/e': 19.4 RV S Perfecto: 12.0 cm/sec  ______________________________________________________________________________ Report approved by: Cheryl Mcclelland 10/03/2023 01:01 PM       Chest XR,  PA & LAT    Result Date: 10/3/2023  XR CHEST 2 VIEWS 10/3/2023 8:17 AM HISTORY: chest pain COMPARISON: X-ray 2022     IMPRESSION: There is a small upper lobar perihilar opacity which more likely reflects superimposed vessels and rib rather than a focal infiltrate. As a precaution, recommend x-ray follow-up within 4 weeks. The lungs are otherwise clear. No pleural effusion. Upper normal heart size. No vascular congestion or pulmonary edema. Mild degenerative changes. ARELY P RADHA, MD   SYSTEM ID:  [id number]    LABS:      Sodium   Date Value Ref Range Status   10/13/2023 136 135 - 145 mmol/L Final     Comment:     Reference intervals for this test were updated on 2023 to more accurately reflect our healthy population. There may be differences in the flagging of prior results with similar values performed with this method. Interpretation of those prior results can be made in the context of the updated reference intervals.    10/12/2023 133 (L) 135 - 145 mmol/L Final     Comment:     Reference intervals for this test were updated on 2023 to more accurately reflect  our healthy population. There may be differences in the flagging of prior results with similar values performed with this method. Interpretation of those prior results can be made in the context of the updated reference intervals.    10/09/2023 137 135 - 145 mmol/L Final     Comment:     Reference intervals for this test were updated on 09/26/2023 to more accurately reflect our healthy population. There may be differences in the flagging of prior results with similar values performed with this method. Interpretation of those prior results can be made in the context of the updated reference intervals.    07/09/2021 132 (L) 133 - 144 mmol/L Final   07/08/2021 128 (L) 133 - 144 mmol/L Final   07/07/2021 134 133 - 144 mmol/L Final     Urea Nitrogen   Date Value Ref Range Status   10/13/2023 11.4 8.0 - 23.0 mg/dL Final   10/12/2023 11.6 8.0 - 23.0 mg/dL Final   10/09/2023 10.8 8.0 - 23.0 mg/dL Final   12/29/2022 17 7 - 30 mg/dL Final   02/11/2022 14 7 - 30 mg/dL Final   08/15/2021 8 7 - 30 mg/dL Final   07/09/2021 13 7 - 30 mg/dL Final   07/08/2021 13 7 - 30 mg/dL Final   06/21/2021 13 7 - 30 mg/dL Final     Hemoglobin   Date Value Ref Range Status   10/14/2023 9.4 (L) 11.7 - 15.7 g/dL Final   10/13/2023 8.8 (L) 11.7 - 15.7 g/dL Final   10/12/2023 9.1 (L) 11.7 - 15.7 g/dL Final   07/09/2021 8.8 (L) 11.7 - 15.7 g/dL Final   07/08/2021 8.8 (L) 11.7 - 15.7 g/dL Final   06/21/2021 12.4 11.7 - 15.7 g/dL Final     Platelet Count   Date Value Ref Range Status   10/14/2023 226 150 - 450 10e3/uL Final   10/13/2023 185 150 - 450 10e3/uL Final   10/12/2023 164 150 - 450 10e3/uL Final   07/09/2021 169 150 - 450 10e9/L Final   07/08/2021 158 150 - 450 10e9/L Final   06/21/2021 124 (L) 150 - 450 10e9/L Final     INR   Date Value Ref Range Status   10/07/2023 1.38 (H) 0.85 - 1.15 Final   10/06/2023 1.08 0.85 - 1.15 Final   10/05/2023 0.95 0.85 - 1.15 Final   09/24/2020 1.01 0.86 - 1.14 Final   05/10/2016 0.95 0.86 - 1.14 Final    04/21/2016 1.02 0.86 - 1.14 Final       30 minutes spent on the day of encounter doing chart review, history and exam, documentation, and further activities as noted.       Kaylee Liu NP  VASCULAR SURGERY

## 2023-10-23 NOTE — TELEPHONE ENCOUNTER
[facility]    Who is the name of the provider?:  PA JAVIER   What is the location you see this provider at/preferred location?: [place]  Person calling / Facility: Francesca from pharmacy  Phone number:  [phone]  Nurse call back needed:  YES     Reason for call:  Regarding xeralto 15 mg bid for 11 days and usually is prescribed for 21 days.   Can you call to verify that's correct      If this isn't a starter pack will need a new Rx     Pharmacy location:  [facility] #[id number] - [facility] [address]  Outside Imaging: n/a   Can we leave a detailed message on this number?  YES

## 2023-10-23 NOTE — TELEPHONE ENCOUNTER
[facility]    Who is the name of the provider?:  PA JAVIER   What is the location you see this provider at/preferred location?: [place]  Person calling / Facility: Shirley Martroquin  Phone number:  [phone] (home)  Nurse call back needed:  YES     Reason for call:  Had appt w/Pa was asked to share her current meds xarelto 15 mg bid #20    Does she need to still be on?        Pharmacy location:  [facility] #[id number] - [facility] [address] [facility]

## 2023-10-23 NOTE — PROGRESS NOTES
Patient is here to discuss follow up    BP (!) 161/82 (BP Location: Right arm, Patient Position: Chair, Cuff Size: Adult Regular)   Pulse 60   LMP  (LMP Unknown)   SpO2 99%     Questions patient would like addressed today are: N/A.    Refills are needed: No    Has homecare services and agency name:  Isa [facility]UI[facility]ONE[facility]

## 2023-10-24 ENCOUNTER — TRANSFERRED RECORDS (OUTPATIENT)
Dept: HEALTH INFORMATION MANAGEMENT | Facility: CLINIC | Age: 63
End: 2023-10-24
Payer: COMMERCIAL

## 2023-10-25 NOTE — TELEPHONE ENCOUNTER
Called pharmacy to discuss. Shirley was given 10 days of the starter pack when she was discharged, therefore only needing 11 more days of 15 mg BID before transitioning to 20 mg once a day.     Pharmacist was ok to fill prescription after conversation and Shirley does not need a new script.     Kaylee Liu, CNP

## 2023-10-25 NOTE — TELEPHONE ENCOUNTER
Kaylee spoke with patient regarding this.  No further action needed.  Please see her encounter.  Alaina Yanez, ESTHERN, RN-[facility] [place]

## 2023-10-26 ENCOUNTER — TELEPHONE (OUTPATIENT)
Dept: INTERNAL MEDICINE | Facility: CLINIC | Age: 63
End: 2023-10-26
Payer: COMMERCIAL

## 2023-10-26 NOTE — TELEPHONE ENCOUNTER
Naty (case management with [facility]) called to update PCP. She stated pt was hospitalized earlier this month for multiple different reasons including MI.     Naty spoke with pt last night and pt understands her POC and has all the appropriate follow up appts made.     Routing to PCP as an FYI

## 2023-10-27 PROCEDURE — 88305 TISSUE EXAM BY PATHOLOGIST: CPT | Performed by: PATHOLOGY

## 2023-10-27 NOTE — TELEPHONE ENCOUNTER
Pt has multiple issues including vascular disease and recent MI and has follow-up with vascular and cardiology. Chart shows that pt also had biopsy of neck mass  by ENT10/11/23 that shows lymphoma but don't see any future appt with ONcology or other. Recently hospitalized. Would suggest pt have follow-up appt with me in the next 2 weeks  (OK to use open virt-Rel, next day, same day, next week appt slot  to get appt scheduled with me) to be sure all medical issues being addressed and to evaluate other needs not being addressed by cardiology, vascular

## 2023-10-30 ENCOUNTER — LAB REQUISITION (OUTPATIENT)
Dept: LAB | Facility: CLINIC | Age: 63
End: 2023-10-30

## 2023-10-30 DIAGNOSIS — R10.13 EPIGASTRIC PAIN: ICD-10-CM

## 2023-10-30 DIAGNOSIS — K30 FUNCTIONAL DYSPEPSIA: ICD-10-CM

## 2023-10-30 DIAGNOSIS — K44.9 DIAPHRAGMATIC HERNIA WITHOUT OBSTRUCTION OR GANGRENE: ICD-10-CM

## 2023-11-01 ENCOUNTER — TELEPHONE (OUTPATIENT)
Dept: OTHER | Facility: CLINIC | Age: 63
End: 2023-11-01
Payer: COMMERCIAL

## 2023-11-01 NOTE — TELEPHONE ENCOUNTER
[facility]    Who is the name of the provider?:  PA JAVIER   What is the location you see this provider at/preferred location?: [place]  Person calling / Facility: Shirley Hendricks  Phone number:  [phone] (home)  Nurse call back needed:  YES     Reason for call:  Patient requesting assistance with concerns she believes are side effects from anti biotics prescribed by [facility].    Pharmacy location:  [facility][id number] [facility] [address]  Outside Imaging: n/a   Can we leave a detailed message on this number?  YES

## 2023-11-01 NOTE — TELEPHONE ENCOUNTER
Patient is s/p right femoral to anterior tibial PTFE bypass graft with single vessel runoff on 10/11 followed by embolectomy of her graft on 10/13. Patient saw Kaylee Liu NP on 10/23/23 at which time she was started on Cephalexin 500 mg BID for cellulitis for 10 days. Today would be the last day of her antibiotic treatment.     Returned call to Shirley and BILL to call back and discuss    Christina RAMIREZ, MARISSA    [facility]  Vascular [facility]  Office: [phone]  Fax: [phone]

## 2023-11-02 ENCOUNTER — OFFICE VISIT (OUTPATIENT)
Dept: OTHER | Facility: CLINIC | Age: 63
End: 2023-11-02
Payer: COMMERCIAL

## 2023-11-02 VITALS — SYSTOLIC BLOOD PRESSURE: 180 MMHG | HEART RATE: 76 BPM | DIASTOLIC BLOOD PRESSURE: 107 MMHG

## 2023-11-02 DIAGNOSIS — L03.115 CELLULITIS OF RIGHT LOWER EXTREMITY: Primary | ICD-10-CM

## 2023-11-02 LAB
PATH REPORT.COMMENTS IMP SPEC: NORMAL
PATH REPORT.FINAL DX SPEC: NORMAL
PATH REPORT.GROSS SPEC: NORMAL
PATH REPORT.MICROSCOPIC SPEC OTHER STN: NORMAL
PATH REPORT.MICROSCOPIC SPEC OTHER STN: NORMAL
PATH REPORT.RELEVANT HX SPEC: NORMAL
PHOTO IMAGE: NORMAL

## 2023-11-02 PROCEDURE — G0463 HOSPITAL OUTPT CLINIC VISIT: HCPCS

## 2023-11-02 PROCEDURE — 99213 OFFICE O/P EST LOW 20 MIN: CPT

## 2023-11-02 PROCEDURE — 99214 OFFICE O/P EST MOD 30 MIN: CPT

## 2023-11-02 NOTE — TELEPHONE ENCOUNTER
Pt is currently being seen in clinic with BRANDON.  This will be addressed during this visit.  Alaina Yanez, ESTHERN, RN-[facility] [place]

## 2023-11-02 NOTE — PROGRESS NOTES
VASCULAR SURGERY PROGRESS NOTE    LOCATION: [facility]    Shirley Hendricks  Medical Record #:  [id number]  YOB: 1960  Age:  63 year old     Date of Service: 11/2/2023    PRIMARY CARE PROVIDER: Vasquez Benoit    Reason for visit:  post-operative appointment     Shirley Hendricks is a 63 year old female who has extensive vascular history with most recently undergoing a right femoral to anterior tibial PTFE bypass graft with single vessel runoff on 10/11 followed by embolectomy of her graft on 10/13. Given recent occlusions and comorbidities will be on Xarelto and Plavix chronically. Today she comes in for previously arranged post-operative visit. She denies pain or claudication of her right leg. 3+ graft pulse noted with palpable DP and AT     Lower extremity incision remains slightly reddened, slightly edematous and dehisced. Improved with Keflex which she will finish tonight. She reports smoking a puff or two every so often.         RECOMMENDATION/RISKS: Would like to hold off starting new round of antibiotics given improvement, no warmth noted. We will do telephone visit next week with picture to decide if needing another course of antibiotics. She will call us if it significantly worsens until then. Follow-up in 3 weeks for suture removal. Discussed importance of smoking cessation.    REVIEW OF SYSTEMS:    A 12 point ROS was reviewed and is negative except for what is listed above in HPI.    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 clot    Post-menopausal     Reflux esophagitis 02/2004    EGD: esophagitis and medium HH    SBO (small bowel obstruction) (H) 08/10/2021    SVT (supraventricular tachycardia)     Thrombocytopenia (H24)     Uncomplicated asthma     Vitamin C deficiency 08/12/2018          Past Surgical History:   Procedure Laterality Date    ANGIOGRAM      ANGIOGRAM Right 6/23/2021    Procedure: RIGHT LOWER EXTREMITY ANGIOGRAM WITH LEFT BRACHIAL ARTERY CUTDOWN;  Surgeon: José Luis Hernandez MD;  Location: [facility] OR    BIOPSY MASS NECK Right 10/11/2023    Procedure: Right Parotid Biopsy;  Surgeon: Randal Mendoza MD;  Location: [facility] OR    BYPASS GRAFT FEMOROPOPLITEAL Right 09/23/2020    Procedure: RIGHT FEMORAL GRAFT TO ABOVE-KNEE POPLITEAL BYPASS USING CADAVERIC SUPERFICIAL FEMORAL ARTERY;  Surgeon: Chadwick Lozano MD;  Location: [facility] OR    BYPASS GRAFT FEMOROPOPLITEAL Right 2/15/2022    Procedure: RIGHT SUPERFICIAL FEMORAL ARTERY GRAFT TO BELOW KNEE POPLITEAL BYPASS WITH CADAVERIC CRYOLIFE  FEMORAL-POPLITEAL ARTERY SIZE: OUTER DIAMETER: 7MM - 6MM;  Surgeon: Chadwick Lozano;  Location: [facility]    BYPASS GRAFT FEMOROPOPLITEAL Right 5/26/2023    Procedure: RIGHT DISTAL SUPERFICIAL FEMORAL GRAFT TO ANTERIOR TIBIAL ARTERY WITH 26 CENTIMETER CADAVERIC SUPERFICIAL FEMORAL ARTERY GRAFT;  Surgeon: Chadwick Lozano MD;  Location: [facility]    BYPASS GRAFT FEMOROPOPLITEAL Right 10/11/2023    Procedure: RIGHT FEMORAL TO POPLITEAL GRAFT THROMBECTOMY;  Surgeon: Chadwick Lozano MD;  Location: [facility]    BYPASS GRAFT INSITU FEMOROPOPLITEAL Right 7/7/2021    Procedure: CREATION RIGHT FEMORAL TO  POPLITEAL ARTERIAL BYPASS USING INSITU VEIN GRAFT;  Surgeon: José Luis Hernandez MD;  Location:  OR    CARDIAC CATHERIZATION  09/03/2009    multivessel CAD without target lesions, med mgmt indicated, preserved ef    CARPAL TUNNEL RELEASE RT/LT Right 05/20/2021    CV CORONARY ANGIOGRAM N/A 10/4/2023    Procedure: Coronary Angiogram;  Surgeon: Rogelio Ricks MD;  Location:  [facility]    CV PCI N/A 10/4/2023    Procedure: Percutaneous Coronary Intervention;  Surgeon: Rogelio Ricks MD;  Location:  [facility]    EMBOLECTOMY LOWER EXTREMITY Right 10/6/2023    Procedure: Right anterior tibial bypass with graft, Right tibial endarterectomy,thrombectomy, Right doraslis pedis thrombectomy, Anterior Tibial vein patch.;  Surgeon: Chdawick Lozano MD;  Location:  OR    ENDARTERECTOMY CAROTID Right 05/11/2016    Procedure: ENDARTERECTOMY CAROTID;  Surgeon: Chadwick Lozano MD;  Location:  OR    ENDARTERECTOMY CAROTID Left 06/08/2020    Procedure: LEFT CAROTID ENDARTERECTOMY with distal facal vein patch  and EEG;  Surgeon: Chadwick Lozano MD;  Location:  OR    FINE NEEDLE ASPIRATION WITHOUT IMAGING GUIDANCE Right 9/22/2023    Procedure: Fine needle aspiration without imaging guidance;  Surgeon: Kiersten Aguilera MD;  Location:  GI    FLUORESCENCE INTRAOPERATIVE VASCULAR ANGIOGRAPHY Right 12/28/2022    Procedure: RIGHT LEG ANGIOGRAM with intervention;  Surgeon: Chadwick Lozano MD;  Location:  OR    GYN SURGERY  left tube    left salpingectomy    IR ANGIOGRAM THROUGH CATHETER (ARTERIAL)  10/6/2023    IR ANGIOGRAM THROUGH CATHETER (ARTERIAL)  10/6/2023    IR LOWER EXTREMITY ANGIOGRAM RIGHT  05/25/2021    IR LOWER EXTREMITY ANGIOGRAM RIGHT  10/5/2023    IR OR ANGIOGRAM  6/23/2021    IR OR ANGIOGRAM  12/28/2022    LAPAROSCOPIC CHOLECYSTECTOMY N/A 09/27/2017    Procedure: LAPAROSCOPIC CHOLECYSTECTOMY;  LAPAROSCOPIC CHOLECYSTECTOMY;  Surgeon:  Jacoby Tapia MD;  [facility]    LAPAROSCOPY DIAGNOSTIC (GENERAL) N/A 8/11/2021    Procedure: Exploratory laparoscopy,  laparoscopic lysis of adhesions, laparotomy;  Surgeon: Corina Ferreira MD;  Location:  OR    OR ANGIOGRAM, LOWER EXTREMITY Right 10/11/2023    Procedure: Or Angiogram, Lower Extremity;  Surgeon: Chadwick Lozano MD;  Location:  OR    ORTHOPEDIC SURGERY      left knee surgery    REPAIR ANEURYSM FEMORAL ARTERY Left 12/28/2022    Procedure: REPAIR LEFT FEMORAL PSEUDOANEURYSM;  Surgeon: Chadwick Lozano MD;  Location:  OR    VASCULAR SURGERY  aoto bi fem  left fem-AK pop    [facility] FABRIC WRAPPING OF ABDOMINAL ANEURYSM      [facility] NONSPECIFIC PROCEDURE  12/2000    angioplasty with stent right fem. a.    [facility] NONSPECIFIC PROCEDURE  1987    sinus surgery    [facility] NONSPECIFIC PROCEDURE  09/02/2009    Emergent left groin exploration with oversewing of bleeding angiographic site. 2. Endarterectomy of common femoral-proximal superficial femoral artery with greater saphenous vein patch angioplasty.   a. [place] of accessory right greater saphenous vein.     [facility] NONSPECIFIC PROCEDURE  08/27/2009    occluded left common iliac and external iliac arteries were successfully revascularized with stenting to 8 and 7 mm        ALLERGIES:  Contrast dye, Pantoprazole, Chantix [varenicline], and Penicillins    MEDS:    Current Outpatient Medications:     acetaminophen (TYLENOL) 500 MG tablet, Take 500-1,000 mg by mouth every 6 hours as needed for mild pain, Disp: , Rfl:     augmented betamethasone dipropionate (DIPROLENE AF) 0.05 % external cream, Apply topically 2 times daily as needed (ear itching), Disp: , Rfl:     BREO ELLIPTA 200-25 MCG/ACT inhaler, Inhale 1 puff into the lungs daily, Disp: 120 each, Rfl: 11    Calcium Carb-Cholecalciferol (CALCIUM CARBONATE-VITAMIN D3) 600-400 MG-UNIT TABS, TAKE ONE TABLET BY MOUTH TWICE DAILY, Disp: 180 tablet, Rfl: 3    carvedilol (COREG) 3.125 MG tablet,  Take 1 tablet (3.125 mg) by mouth 2 times daily (with meals), Disp: 60 tablet, Rfl: 0    cephALEXin (KEFLEX) 500 MG capsule, Take 1 capsule (500 mg) by mouth 2 times daily for 10 days, Disp: 20 capsule, Rfl: 0    clopidogrel (PLAVIX) 75 MG tablet, Take 1 tablet (75 mg) by mouth daily, Disp: 90 tablet, Rfl: 3    denosumab (PROLIA) 60 MG/ML SOLN injection, Inject 1 mL (60 mg) Subcutaneous every 6 months INDICATION: TO TREAT OSTEOPOROSIS, Disp: 1 Syringe, Rfl: 0    diphenhydrAMINE (BENADRYL) 25 MG tablet, take Benadryl 25-50 mg one hour prior to the procedure, Disp: 2 tablet, Rfl: 0    empagliflozin (JARDIANCE) 10 MG TABS tablet, Take 1 tablet (10 mg) by mouth daily, Disp: 90 tablet, Rfl: 1    ferrous sulfate (FEROSUL) 325 (65 Fe) MG tablet, Take 1 tablet (325 mg) by mouth every other day, Disp: 60 tablet, Rfl: 1    gabapentin (NEURONTIN) 100 MG capsule, TAKE ONE CAPSULE BY MOUTH THREE TIMES DAILY, Disp: 90 capsule, Rfl: 0    lisinopril (ZESTRIL) 5 MG tablet, Take 1 tablet (5 mg) by mouth daily, Disp: 30 tablet, Rfl: 0    nicotine (NICODERM CQ) 14 MG/24HR 24 hr patch, Place 1 patch onto the skin daily, Disp: 30 patch, Rfl: 1    nitroGLYcerin (NITROSTAT) 0.4 MG sublingual tablet, Place 1 tablet (0.4 mg) under the tongue See Admin Instructions for chest pain, Disp: 30 tablet, Rfl: 11    omeprazole (PRILOSEC) 20 MG DR capsule, TAKE ONE CAPSULE BY MOUTH DAILY, Disp: 90 capsule, Rfl: 3    polyethylene glycol (MIRALAX) 17 GM/Dose powder, Take 17 g by mouth daily Hold for diarrhea or loose stools, Disp: 510 g, Rfl: 0    rivaroxaban ANTICOAGULANT (XARELTO) 15 MG TABS tablet, Take 1 tablet (15 mg) by mouth 2 times daily (with meals), Disp: 20 tablet, Rfl: 0    Rivaroxaban ANTICOAGULANT 15 & 20 MG TBPK Starter Therapy Pack, Take 15 mg by mouth 2 times daily (with meals) for 11 days, THEN 20 mg daily with food for 60 days., Disp: 51 each, Rfl: 0    rosuvastatin (CRESTOR) 40 MG tablet, Take 1 tablet (40 mg) by mouth At Bedtime,  Disp: 90 tablet, Rfl: 3    senna-docusate (SENOKOT-S/PERICOLACE) 8.6-50 MG tablet, Take 1 tablet by mouth daily as needed for constipation, Disp: 30 tablet, Rfl: 0    triamcinolone (KENALOG) 0.1 % external ointment, Apply topically 2 times daily as needed for irritation Apply to back, Disp: , Rfl:     SOCIAL HABITS:    History   Smoking Status    Some Days    Packs/day: 0.50    Years: 52.00    Types: Cigarettes    Start date: 1968   Smokeless Tobacco    Never     Social History    Substance and Sexual Activity      Alcohol use: Not Currently        Comment: x1-2 yr      History   Drug Use    Types: Marijuana     Comment: 2x per day       FAMILY HISTORY:    Family History   Problem Relation Age of Onset    Cancer Mother         bladder    Cardiovascular Father         alive,multiple heart attacks    Diabetes Maternal Grandmother     Lung Cancer Maternal Grandmother     Blood Disease Brother         clotting disorder       PE:  BP (!) 180/107 (BP Location: Left arm, Patient Position: Chair, Cuff Size: Adult Regular)   Pulse 76   LMP  (LMP Unknown)   Wt Readings from Last 1 Encounters:   10/11/23 52.5 kg (115 lb 11.2 oz)     There is no height or weight on file to calculate BMI.    EXAM:  GENERAL: well-developed 63 year old female who appears her stated age  CARDIAC: normal   CHEST/LUNG: normal respiratory effort   MUSCULOSKELETAL: grossly normal and both lower extremities are intact, no lower extremity edema  NEUROLOGIC: focally intact, alert and oriented x 3  PSYCH: appropriate affect  VASCULAR:       DIAGNOSTIC STUDIES:     Images:  XR Foot Lt 3+ Views    Result Date: 10/19/2023  COMPARISON:  None. FINDINGS:  Diffuse osteopenia limits the sensitivity of the exam particularly for subtle fractures. No acute fractures or dislocations. If there is clinical concern for an occult fracture, consider immobilization and follow-up radiographs in 10-14 days. Degenerative arthrosis of the second, third and fourth  tarsometatarsal joints. Degenerative arthrosis of the interphalangeal joints of the toes. Small Achilles calcaneal enthesophyte. No erosive changes.    XR Surgery CONNER Fluoro Less Than 5 Min w Stills    Result Date: 10/11/2023  SURGERY C-ARM FLUOROSCOPY LESS THAN FIVE MINUTES WITH STILLS 10/11/2023 11:50 AM HISTORY:  Embolectomy right leg, c-arm #6. COMPARISON: None.     IMPRESSION: A total of 8 spot fluoroscopic images obtained intraoperatively during right leg thrombectomy. Bypass graft appears partially patent, though limited visualization. Limited visualization overlying the lower leg. Please see operative report for details. Total fluoroscopic time is 1.3 minutes with a total of eight spot fluoroscopic images. NICOLAS HOPSON MD   SYSTEM ID:  [id number]    IR Angiogram through catheter (arterial)    Result Date: 10/9/2023  INTERVENTIONAL RADIOLOGY ANGIOGRAM THROUGH CATHETER (ARTERIAL) 10/6/2023 5:47 PM HISTORY:  62-year-old patient with continued thrombolysis treatment. Patient was evaluated earlier in the day with emboli extending to the foot with limited outflow. Therefore, infusion catheter was to remain in place with additional TPA administration. Patient also had considerable spasm of the anterior tibial artery. Patient returns for evaluation. TECHNIQUE: Patient was brought to interventional radiology department and the right groin sheath and catheter were prepped and draped in standard sterile fashion. 1% lidocaine was used for local anesthesia. Angiogram was performed through the infusion catheter which is noted throughout the length of the more proximal bypass as well as the more distal bypass. The bypass grafts are patent, as is the anterior tibial artery with good blood flow through the dorsalis pedis artery, returned to baseline. Therefore, the infusion catheter was removed. Access site is antegrade within the proximal bypass graft, therefore closure device is not felt to be appropriate. Therefore,  sheath removed after TPA and heparin had been held for 30 minutes and hemostasis achieved with manual compression over a total of 60 minutes. During this entire time of compression, pulses were present throughout the graft as well as the dorsalis pedis artery which were checked frequently, greater than every 5 minutes. The patient tolerated this segment of the procedure well. Patient had been treated for contrast allergy with multiple rounds of steroids earlier in the day. Sedation: None Sedation time: 14 minutes Fluoroscopic time: 0.5 minutes Air kerma: 4.83 mGy Contrast: 20 mL of Visipaque administered intra-arterially without complication. FINDINGS: A total of five angiogram sequences obtained throughout the procedure. Patient has a proximal femoral to above-knee popliteal surgical bypass graft which is patent. Patient has an additional bypass graft arising from the distal aspect of this graft to the anterior tibial artery. These segments are patent with washout, previously noted to be stagnant. Anterior tibial artery is patent and not spasmed. Dorsalis pedis artery is patent with retrograde filling of the distal posterior tibial artery, returned to baseline.     IMPRESSION: Completion of thrombolysis with patency of right lower extremity bypass grafts and single vessel runoff via the anterior tibial artery. Blood flow appears restored to baseline in the foot with blood flow via the dorsalis pedis artery and retrograde filling of the distal posterior tibial artery. Patient not a candidate for closure device, therefore hemostasis was achieved with manual compression for 60 minutes. Pulses were maintained within the graft and dorsalis pedis artery throughout this time and upon patient's return to the ICU. NICOLAS HOPSON MD   SYSTEM ID:  [id number]    IR Angiogram through catheter (arterial)    Result Date: 10/9/2023  INTERVENTIONAL RADIOLOGY ANGIOGRAM THROUGH CATHETER (ARTERIAL) 10/6/2023 11:42 AM HISTORY:   62-year-old woman with history of right lower extremity bypass grafts, patient initially had a femoral to above-knee popliteal and subsequently an additional graft from the distal aspect of this graft to the anterior tibial artery. Patient has undergone thrombolysis for approximately 20 hours, request made for evaluation. TECHNIQUE: Patient was brought to interventional radiology department and informed consent reiterated. Patient was placed in a supine position. Skin overlying the right groin where an antegrade puncture was made was prepped and draped in standard sterile fashion. 1% lidocaine was used for local anesthesia. An angiogram was performed through the infusion catheter and blood flow is noted throughout the distal aspect of the graft. Angiogram images were performed demonstrating this distal graft as patent. However, it is noted that a long sheath had been placed from the access site to the mid to distal SFA. Wire was placed through the infusion catheter and the catheter was removed. Angiogram was then performed through the sheath and no blood flow was noted through the graft. Thrombus had been present along the sheath and dislodged to the distal aspect of the graft with now static blood flow. This was recognized and the long sheath was exchanged for a short 6 [place] sheath. Angiogram was then performed demonstrating stagnant blood flow. The anterior tibial artery noted to be in spasm. Therefore, periodically throughout the remainder of the exam a total of 1350 mcg of nitroglycerin was administered into the anterior tibial artery as well as the dorsalis pedis artery. Total of 6 mg of TPA was administered in the foot, anterior tibial artery, and portions of the graft. An 018 wire was placed into the anterior tibial artery and 3 mm balloon angioplasty performed in the distal anastomosis. Possis device was used for mechanical thrombectomy throughout the graft with resolution of thrombus within the graft and  the anterior tibial artery, though essentially no outflow in the foot. More nitroglycerin and TPA were administered throughout these segments. No restoration of antegrade blood flow was identified. Therefore, a 50 cm infusion length catheter was placed throughout the bypass grafts. Patient did have a palpable pulse in the graft and the dorsalis pedis artery, though suspect this is a somewhat waterhammer pulse as due to limited outflow. Plan will be continued TPA at 0.5 mg/hour through the infusion catheter and heparin through the short sheath at 500 units/hour. Visual return the following day for repeat angiogram. Sedation: A moderate level of sedation was achieved with 6 mg IV Versed and 100 mcg IV fentanyl. 6 mg TPA administered directly into the graft and arterial system as well as 1350 mcg of nitroglycerin administered into the arteries, especially the anterior tibial and dorsalis pedis arteries. Sedation time: 104 minutes Please note the above medications were administered by interventional radiology staff under my direct supervision. Patient's vital signs were monitored and remained stable throughout the procedure. Fluoroscopic time: 22.5 minutes Air kerma: 50 mGy Contrast: 160 mL of Visipaque administered intra-arterially without complication. Local anesthetic: 10 mL 1% lidocaine. FINDINGS: A total of 36 angiogram sequences and spot fluoroscopic images obtained throughout the procedure. Initial angiogram through the infusion catheter demonstrates patency of the distal graft as well as the anterior tibial artery with mild stenosis at the distal anastomosis. Once the infusion catheter was exchanged, suspect thrombus noted along the portion of the catheter without side holes and along the sheath, which was dislodged and without further antegrade blood flow. Blood flow is now statement throughout both the proximal and distal grafts. Additionally, the entire anterior tibial artery spasmed. TPA and nitroglycerin  was administered as well as 3 mm balloon angioplasty at the distal anastomosis. There is periodic improvement, though overall relatively stasis of blood flow. Thrombus is noted forming in these segments and mechanical thrombectomy performed with a Possis device. At completion, the graft is patent, though essentially without outflow both related to spasm of the anterior tibial artery and limited outflow. Angiogram was performed in the dorsalis pedis artery with small filling defect, treated with TPA and nitroglycerin. An infusion catheter was placed throughout the grafts at completion for continued TPA administration. Interestingly, the patient had palpable pulse in the graft and dorsalis pedis artery, though suspect waterhammer pulse given limited outflow. Patient will return the following day.     IMPRESSION: 1. Thrombus dislodged from more proximal aspect and extended distally to the graft, anterior tibial artery and foot. Remaining efforts were made in effort to establish adequate outflow, especially in the foot. 6 mg of TPA and 1350 mcg of nitroglycerin administered into the foot and anterior tibial artery. 2. Bypass segments appear patent at completion, though stagnant blood flow. Therefore 50 cm infusion length catheter will remain in place with TPA administration at 0.5 mg/hour. Patient will return the following day. 3. 3 mm balloon angioplasty at the distal anastomosis of the anterior tibial artery. 4. Long 6 Spanish sheath was exchanged for a short 6 Spanish sheath. 5. First time mechanical thrombectomy performed during this hospitalization with Possis device throughout the tandem bypass grafts. NICOLAS HOPSON MD   SYSTEM ID:  [id number]    CT Chest Abdomen Pelvis w/o Contrast    Result Date: 10/7/2023  EXAM: CT CHEST, ABDOMEN, PELVIS WITHOUT CONTRAST LOCATION: [facility] DATE: 10/07/2023 INDICATION: Lymphoma staging. COMPARISON: None. TECHNIQUE: CT scan of the chest, abdomen, and  pelvis was performed without IV contrast. Multiplanar reformats were obtained. Dose reduction techniques were used. CONTRAST: None. FINDINGS: LUNGS AND PLEURA: Tiny benign fissural nodule on the left image 152. Additional fissural nodules along the minor fissure likely represent nonenlarged intrapulmonary lymph nodes. 3 mm subpleural nodule right lower lobe image 220 probably an additional nonenlarged intrapulmonary lymph node. Benign granulomatous change. No effusions. MEDIASTINUM/AXILLAE: No adenopathy demonstrated in the chest in the absence of contrast. No aneurysm. CORONARY ARTERY CALCIFICATION: Moderate. HEPATOBILIARY: Cholecystectomy. Unremarkable liver. PANCREAS: No significant mass, duct dilatation, or inflammatory change. SPLEEN: Normal size. ADRENAL GLANDS: No significant nodules. KIDNEYS/BLADDER: Left renal atrophy. Persistent nephrogram on the right, question renal injury. BOWEL: No obstruction or inflammatory change. LYMPH NODES: No gross abdominopelvic adenopathy in the absence of contrast. VASCULATURE: There are extensive atherosclerotic changes of the visualized aorta and its branches. There is no evidence of aortic dissection or aneurysm. Aortobifem bypass change. PELVIC ORGANS: No pelvic masses. MUSCULOSKELETAL: No frankly destructive bony lesions.     IMPRESSION: 1.  No adenopathy demonstrated in the chest, abdomen, and pelvis in the absence of contrast. 2.  Persistent nephrogram on the right and left renal atrophy, question acute renal injury. 3.  A few scattered fissural nodules and subpleural nodules are noted in the lungs, likely nonenlarged intrapulmonary lymph nodes. These can be reevaluated on routine oncologic follow-up imaging.     CT Soft Tissue Neck w/o Contrast    Result Date: 10/7/2023  EXAM: CT SOFT TISSUE NECK W/O CONTRAST LOCATION: [facility] DATE: 10/7/2023 INDICATION: Lymphoma staging COMPARISON: CT head 08/02/2023 TECHNIQUE: Routine CT Soft Tissue Neck  without IV contrast. Multiplanar reformats. Dose reduction techniques were used. FINDINGS: MUCOSAL SPACES/SOFT TISSUES: Normal mucosal spaces of the upper aerodigestive tract within the limits of noncontrast imaging. No definite mucosal mass or inflammation identified. Normal vocal cords and infraglottic trachea. Normal parapharyngeal space and subcutaneous soft tissues. Normal oral cavity,  spaces, and floor of mouth structures. LYMPH NODES: Interval decrease in size of a right level 1B lymph node, currently measuring 0.8 x 1.2 cm (series 3, image 58), previously measuring 1.2 x 1.7 cm. A left level 1B lymph node currently measures 0.5 x 0.6 cm (series 3, image 60), previously measuring 0.6 x 0.8 cm. SALIVARY GLANDS: Normal parotid and submandibular glands. THYROID: Unchanged 0.7 x 0.9 cm low-density nodule in the right thyroid lobe. VISUALIZED INTRACRANIAL/ORBITS/SINUSES: No abnormality of the visualized intracranial compartment or orbits. Visualized paranasal sinuses and mastoid air cells are clear. OTHER: No destructive osseous lesion. Please see same day CT chest pelvis for pulmonary findings.     IMPRESSION: 1.  When compared to 08/02/2023, there has been interval decrease in size of the previously present enlarged right level 1B lymph node, currently measuring 0.8 x 1.2 cm, previously measuring 1.2 x 1.7 cm. A left level 1B lymph node has also decreased in size, currently measuring 0.5 x 0.6 cm, previously measuring 0.6 x 0.8 cm.    IR Lower Extremity Angiogram Right    Addendum Date: 10/5/2023    ADDENDUM: Indication should read as follows:  62-year-old female with extensive vascular surgery history including aortobifemoral bypass, right femoral-popliteal bypass, and right lower extremity jump graft from the fem-pop bypass to anterior tibial artery. Patient admitted for concerns of NSTEMI and acute limb ischemia with absent distal right lower extremity pulses. US/Doppler confirmed complete  occlusion of the right lower extremity jump graft, anterior tibial, and posterior tibial arteries. IR consulted for angiogram and possible intervention. SHARMAINE CARLSON MD   SYSTEM ID:  [id number]    Result Date: 10/5/2023  [place] [facility] LOCATION: [facility] DATE: 10/5/2023 PROCEDURE: 1. US-GUIDED VASCULAR ACCESS 2. RIGHT LOWER EXTREMITY ANGIOGRAPHY 3. RIGHT LOWER EXTREMITY ARTERIAL CATHTER DIRECTED THROMBOLYSIS INITIATION 4. MODERATE SEDATION INTERVENTIONAL RADIOLOGIST:  Sharmaine Carlson MD INDICATION: 62-year-old female with stent. CONSENT: The risks, benefits and alternatives of dialysis arteriovenous shunt evaluation with possible angioplasty, stent placement, thrombolysis and/or tunneled dialysis catheter placement were discussed with the patient in detail. All questions were answered. Informed consent was given to proceed with the procedure. MODERATE SEDATION: Versed 2.5 mg IV; Fentanyl 125 mcg IV.  Under physician supervision, Versed and fentanyl were administered for moderate sedation. Pulse oximetry, heart rate and blood pressure were continuously monitored by an independent trained observer. The physician spent 60 minutes of face-to-face sedation time with the patient. CONTRAST: 45 mL Visipaque intravascular. ANTIBIOTICS: None. ADDITIONAL MEDICATIONS: None. FLUOROSCOPIC TIME: 10.0 minutes. RADIATION DOSE: Air Kerma: 119 mGy. COMPLICATIONS: No immediate complications. STERILE BARRIER TECHNIQUE: Maximum sterile barrier technique was used. Cutaneous antisepsis was performed at the operative site with application of 2% chlorhexidine and large sterile drape. Prior to the procedure, the  and assistant performed hand hygiene and wore hat, mask, sterile gown, and sterile gloves during the entire procedure. PROCEDURE: The patient was positioned supine on the fluoroscopic table and the bilateral groins were prepped and draped in usual sterile fashion. 1% lidocaine was used for local  anesthesia. Using ultrasound guidance, the right common femoral artery was accessed just proximal to the femoral-popliteal bypass proximal origin. Permanent ultrasound and fluoroscopic images of access site were obtained. Access was upgraded for a 6 Lithuanian by 30 cm vascular sheath which was advanced over a wire into the proximal femoral-popliteal bypass graft. Right lower extremity angiography was performed in multiple stations through the arterial sheath. Following these results, an angled catheter and Glidewire were used to cross into the occluded jump graft. Gentle contrast injection was performed to confirm intraluminal position with spot fluoroscopic image obtained. Presumed site of jump graft distal anastomosis was briefly attempted to be crossed without success. Over a wire, a 100 cm total length 30 cm infusion length infusion length [employer] catheter was positioned across the jump graft and guidewire was removed. Spot fluoroscopic images obtained to document final catheter and sheath positioning. The sheath was secured to the skin with a pursestring suture. A sterile dressing was applied in used to cover the external portions of the infusion catheter and sheath. TPA infusion via the infusion catheter was then initiated at a rate of 0.5 mg/hr. A heparin infusion was initiated through the vascular sheath at a rate of 500 units/hr. The patient tolerated the procedure well and left interventional radiology in stable condition. FINDINGS: 1. Right femoral ultrasound demonstrates patency of the common femoral artery and proximal femoral-popliteal bypass graft. 2. Right lower extremity angiography demonstrates widely patent femoral-popliteal bypass graft extending from the distal common femoral artery to the above-knee popliteal artery. Occlusion of the presumed femoral-popliteal bypass graft distal anastomosis at the popliteal artery, with associated prominent arterial collaterals. Known jump graft extending  from the distal femoral-popliteal bypass graft to the anterior tibial artery is occluded, with arterial stump is imminently representing the proximal anastomosis. 3. Following successful crossing into the jump graft, gentle contrast injection demonstrates thrombus throughout. 4. Spot fluoroscopic images document infusion catheter extending from the jump graft proximal anastomosis to just proximal to presumed site of distal jump graft anastomosis. The vascular sheath terminates in the mid-distal femoral-popliteal bypass graft.     IMPRESSION: Right lower extremity angiogram with catheter directed thrombolysis initiation across the occluded jump graft extending from the distal femoral-popliteal bypass to the anterior tibial artery. PLAN: The patient will be monitored in the ICU overnight. The patient will have the infusion continue during this time period with close monitoring for thrombolytic complications. The patient will return to IR on subsequent day for additional angiogram to evaluate for lysis progression and possible additional interventions. SHARMAINE BAR MD   SYSTEM ID:  [id number]    Cardiac Catheterization    Result Date: 10/4/2023    Prox RCA lesion is 45% stenosed.   RPAV-1 lesion is 30% stenosed.   RPAV-2 lesion is 50% stenosed.   1st Mrg lesion is 45% stenosed.   1st Diag lesion is 100% stenosed.   Dist Cx lesion is 55% stenosed. Right dominant coronary anatomy. Completely occluded (probably chronic) D1 with left to left collaterals from distal LAD to D1. Moderate coronary artery disease involving proximal to mid RCA which is not hemodynamically significant-IFR negative. Moderate coronary disease involving distal small-caliber RPL and distal circumflex artery.      Lower Extremity Arterial Duplex Right    Result Date: 10/4/2023  US LOWER EXTREMITY ARTERIAL DUPLEX RIGHT PROCEDURE DATE: 10/4/2023 8:45 AM HISTORY:  Concern for occluded jump graft. Patient with a history of bilateral aortofemoral  bypass graft, right-sided SFA to below knee popliteal artery bypass graft. Subsequent jump graft from the distal femoropopliteal to the anterior tibial artery placed 2023. Concern for occlusion of the jump graft. COMPARISON: Lower extremity arterial ultrasound 8/10/2023 TECHNIQUE:  Duplex imaging is performed utilizing gray-scale, two-dimensional images and color-flow imaging. Doppler waveform analysis and spectral Doppler imaging is also performed. FINDINGS: Patency of the right femoropopliteal bypass with multiphasic waveforms. The distal anastomosis demonstrates focal velocity elevation and tortuosity. There is occlusion of the popliteal artery distal to the femoropopliteal graft anastomosis. The jump graft from the distal femoropopliteal bypass to the anterior tibial artery appears completely thrombosed beginning just distal to the anastomosis. The native popliteal artery and TP trunk appear chronically occluded. The native ISIDRO and DP are completely occluded.     IMPRESSION: 1.  Complete occlusion of the jump graft, ISIDRO, and DPA. The inflow femoropopliteal bypass graft is patent. 2.  Focal velocity elevation of the distal femoropopliteal graft anastomosis suggesting luminal stenosis, however this is distal to the origin of the jump graft. 3.  Redemonstration of chronic occlusion of the outflow popliteal artery and TP trunk distal to the femoropopliteal bypass graft. Dr. Vital discussed the case in person with Dr. Lozano. BERYL VITAL MD   SYSTEM ID:  [id number]    Echocardiogram Complete    Result Date: 10/3/2023  [id number]^IRVIN^NICK  [facility] [address] [place] [address]  Name: KASIA WAN MRN: [id number] : 1960 Study Date: 10/03/2023 11:06 AM Age: 62 yrs Gender: Female Patient Location: [facility] Reason For Study: Acute Myocardial Infarction Ordering Physician: NICK BRYAN Referring Physician: Kaycee  Vasquez WANG Performed By: Radha Mcfadden RDCS  BSA: 1.5 m2 Height: 63 in Weight: 115 lb HR: 80 BP: 108/96 mmHg ______________________________________________________________________________ Procedure Complete Portable Echo Adult. Optison (NDC #3030-2755) given intravenously. Technically difficult study. ______________________________________________________________________________ Interpretation Summary  Technically difficult study with some improvement after contrast use.  Normal LV size and wall thickness with severely reduced LV systolic function. Visually estimated LVEF is around 30 to 35%. Mid to distal portions of the left ventricle are completely akinetic to severely hypokinetic. Basal segment contractility seems preserved. Findings likely suggest stress-induced cardiomyopathy. Normal RV size and systolic function. No hemodynamically significant valve disease. IVC is normal in size and collapsible. ______________________________________________________________________________ Left Ventricle The left ventricle is normal in size. There is normal left ventricular wall thickness. The visual ejection fraction is 30-35%. Diastolic Doppler findings (E/E' ratio and/or other parameters) suggest left ventricular filling pressures are increased.  Right Ventricle The right ventricle is normal in size and function.  Atria Normal left atrial size. Right atrial size is normal.  Mitral Valve There is mild mitral annular calcification. There is trace mitral regurgitation. There is no mitral valve stenosis.  Tricuspid Valve The tricuspid valve is not well visualized, but is grossly normal. Right ventricular systolic pressure could not be approximated due to inadequate tricuspid regurgitation.  Aortic Valve The aortic valve is not well visualized. The aortic valve is trileaflet with aortic valve sclerosis. No aortic regurgitation is present. No hemodynamically significant valvular aortic stenosis.  Pulmonic Valve The pulmonic valve  is not well visualized.  Vessels The aortic root is not well visualized. The aortic root is normal size. The inferior vena cava was normal in size with preserved respiratory variability.  Pericardium There is no pericardial effusion.  ______________________________________________________________________________ MMode/2D Measurements & Calculations IVSd: 0.92 cm LVIDd: 4.0 cm LVIDs: 2.6 cm LVPWd: 0.86 cm FS: 36.9 % LV mass(C)d: 110.4 grams LV mass(C)dI: 72.2 grams/m2  Ao root diam: 3.0 cm Ao root diam index Ht(cm/m): 1.9 Ao root diam index BSA (cm/m2): 2.0 RWT: 0.42  Doppler Measurements & Calculations MV E max perfecto: 99.4 cm/sec MV A max perfecto: 118.0 cm/sec MV E/A: 0.84 MV dec time: 0.12 sec E/E' av.2 Lateral E/e': 15.0 Medial E/e': 19.4 RV S Perfecto: 12.0 cm/sec  ______________________________________________________________________________ Report approved by: Cheryl Mcclelland 10/03/2023 01:01 PM         LABS:      Sodium   Date Value Ref Range Status   10/13/2023 136 135 - 145 mmol/L Final     Comment:     Reference intervals for this test were updated on 2023 to more accurately reflect our healthy population. There may be differences in the flagging of prior results with similar values performed with this method. Interpretation of those prior results can be made in the context of the updated reference intervals.    10/12/2023 133 (L) 135 - 145 mmol/L Final     Comment:     Reference intervals for this test were updated on 2023 to more accurately reflect our healthy population. There may be differences in the flagging of prior results with similar values performed with this method. Interpretation of those prior results can be made in the context of the updated reference intervals.    10/09/2023 137 135 - 145 mmol/L Final     Comment:     Reference intervals for this test were updated on 2023 to more accurately reflect our healthy population. There may be differences in the flagging of prior  results with similar values performed with this method. Interpretation of those prior results can be made in the context of the updated reference intervals.    07/09/2021 132 (L) 133 - 144 mmol/L Final   07/08/2021 128 (L) 133 - 144 mmol/L Final   07/07/2021 134 133 - 144 mmol/L Final     Urea Nitrogen   Date Value Ref Range Status   10/13/2023 11.4 8.0 - 23.0 mg/dL Final   10/12/2023 11.6 8.0 - 23.0 mg/dL Final   10/09/2023 10.8 8.0 - 23.0 mg/dL Final   12/29/2022 17 7 - 30 mg/dL Final   02/11/2022 14 7 - 30 mg/dL Final   08/15/2021 8 7 - 30 mg/dL Final   07/09/2021 13 7 - 30 mg/dL Final   07/08/2021 13 7 - 30 mg/dL Final   06/21/2021 13 7 - 30 mg/dL Final     Hemoglobin   Date Value Ref Range Status   10/14/2023 9.4 (L) 11.7 - 15.7 g/dL Final   10/13/2023 8.8 (L) 11.7 - 15.7 g/dL Final   10/12/2023 9.1 (L) 11.7 - 15.7 g/dL Final   07/09/2021 8.8 (L) 11.7 - 15.7 g/dL Final   07/08/2021 8.8 (L) 11.7 - 15.7 g/dL Final   06/21/2021 12.4 11.7 - 15.7 g/dL Final     Platelet Count   Date Value Ref Range Status   10/14/2023 226 150 - 450 10e3/uL Final   10/13/2023 185 150 - 450 10e3/uL Final   10/12/2023 164 150 - 450 10e3/uL Final   07/09/2021 169 150 - 450 10e9/L Final   07/08/2021 158 150 - 450 10e9/L Final   06/21/2021 124 (L) 150 - 450 10e9/L Final     INR   Date Value Ref Range Status   10/07/2023 1.38 (H) 0.85 - 1.15 Final   10/06/2023 1.08 0.85 - 1.15 Final   10/05/2023 0.95 0.85 - 1.15 Final   09/24/2020 1.01 0.86 - 1.14 Final   05/10/2016 0.95 0.86 - 1.14 Final   04/21/2016 1.02 0.86 - 1.14 Final       30 minutes spent on the day of encounter doing chart review, history and exam, documentation, and further activities as noted.     Kaylee Liu NP  VASCULAR SURGERY

## 2023-11-02 NOTE — PROGRESS NOTES
[facility]        Patient is here for a  follow up.     Pt is currently taking Statin, Plavix, and Xarelto.    BP (!) 180/107 (BP Location: Left arm, Patient Position: Chair, Cuff Size: Adult Regular)   Pulse 76   LMP  (LMP Unknown)     The provider has been notified that the patient has no concerns.     Questions patient would like addressed today are: N/A.    Refills are needed: N/A    Has homecare services and agency name:  Isa Al MA

## 2023-11-05 ENCOUNTER — HEALTH MAINTENANCE LETTER (OUTPATIENT)
Age: 63
End: 2023-11-05

## 2023-11-07 ENCOUNTER — HOSPITAL ENCOUNTER (OUTPATIENT)
Dept: CARDIOLOGY | Facility: CLINIC | Age: 63
Discharge: HOME | End: 2023-11-07
Attending: INTERNAL MEDICINE | Admitting: INTERNAL MEDICINE
Payer: COMMERCIAL

## 2023-11-07 DIAGNOSIS — I51.81 TAKOTSUBO CARDIOMYOPATHY: ICD-10-CM

## 2023-11-07 LAB — LVEF ECHO: NORMAL

## 2023-11-07 PROCEDURE — 93325 DOPPLER ECHO COLOR FLOW MAPG: CPT

## 2023-11-07 PROCEDURE — 93325 DOPPLER ECHO COLOR FLOW MAPG: CPT | Mod: 26 | Performed by: INTERNAL MEDICINE

## 2023-11-07 PROCEDURE — 93321 DOPPLER ECHO F-UP/LMTD STD: CPT | Mod: 26 | Performed by: INTERNAL MEDICINE

## 2023-11-07 PROCEDURE — 93308 TTE F-UP OR LMTD: CPT

## 2023-11-07 PROCEDURE — 93308 TTE F-UP OR LMTD: CPT | Mod: 26 | Performed by: INTERNAL MEDICINE

## 2023-11-08 ENCOUNTER — VIRTUAL VISIT (OUTPATIENT)
Dept: OTHER | Facility: CLINIC | Age: 63
End: 2023-11-08
Payer: COMMERCIAL

## 2023-11-08 DIAGNOSIS — B37.31 YEAST INFECTION OF THE VAGINA: Primary | ICD-10-CM

## 2023-11-08 PROCEDURE — 99024 POSTOP FOLLOW-UP VISIT: CPT | Mod: 95

## 2023-11-08 RX ORDER — FLUCONAZOLE 150 MG/1
150 TABLET ORAL ONCE
Qty: 1 TABLET | Refills: 0 | Status: SHIPPED | OUTPATIENT
Start: 2023-11-08 | End: 2023-11-08

## 2023-11-08 NOTE — PROGRESS NOTES
Shirley is a 63 year old who is being evaluated via a billable telephone visit.      What phone number would you like to be contacted at? [phone]  How would you like to obtain your AVS? Daisyharjuan pablo    Distant Location (provider location):  On-site      Objective           Vitals:  No vitals were obtained today due to virtual visit.    RAYSA QUIÑONEZ

## 2023-11-08 NOTE — PROGRESS NOTES
Vascular Surgery Progress Note    Shirley Hendricks is a 63 year old female, stopped antibiotics last week for concerns of cellulitis. She has since reported that the redness continues to improve, and incision is nearly healed. Given that, we will not reinitiate a course of antibiotics.    However she has a yeast infection and has not been seen, ordered Fluconazole for yeast infection. Will see Shirley as previously scheduled in 2 weeks.     Total call: 10 minutes    Kaylee Liu, CNP

## 2023-11-13 ENCOUNTER — OFFICE VISIT (OUTPATIENT)
Dept: INTERNAL MEDICINE | Facility: CLINIC | Age: 63
End: 2023-11-13
Payer: COMMERCIAL

## 2023-11-13 VITALS
BODY MASS INDEX: 20.92 KG/M2 | OXYGEN SATURATION: 99 % | HEART RATE: 83 BPM | SYSTOLIC BLOOD PRESSURE: 131 MMHG | WEIGHT: 113.7 LBS | TEMPERATURE: 97.4 F | DIASTOLIC BLOOD PRESSURE: 74 MMHG | HEIGHT: 62 IN

## 2023-11-13 DIAGNOSIS — I25.10 CORONARY ARTERY DISEASE INVOLVING NATIVE CORONARY ARTERY OF NATIVE HEART WITHOUT ANGINA PECTORIS: ICD-10-CM

## 2023-11-13 DIAGNOSIS — F17.200 TOBACCO USE DISORDER: ICD-10-CM

## 2023-11-13 DIAGNOSIS — D64.9 ANEMIA, UNSPECIFIED TYPE: ICD-10-CM

## 2023-11-13 DIAGNOSIS — L30.9 DERMATITIS: ICD-10-CM

## 2023-11-13 DIAGNOSIS — Z23 FLU VACCINE NEED: ICD-10-CM

## 2023-11-13 DIAGNOSIS — C85.91 LYMPHOMA OF LYMPH NODES OF NECK, UNSPECIFIED LYMPHOMA TYPE (H): ICD-10-CM

## 2023-11-13 DIAGNOSIS — I51.81 TAKOTSUBO CARDIOMYOPATHY: ICD-10-CM

## 2023-11-13 DIAGNOSIS — I73.9 PAD (PERIPHERAL ARTERY DISEASE) (H): Primary | ICD-10-CM

## 2023-11-13 LAB
ERYTHROCYTE [DISTWIDTH] IN BLOOD BY AUTOMATED COUNT: 15 % (ref 10–15)
ERYTHROCYTE [SEDIMENTATION RATE] IN BLOOD BY WESTERGREN METHOD: 39 MM/HR (ref 0–30)
HCT VFR BLD AUTO: 33.2 % (ref 35–47)
HGB BLD-MCNC: 10.5 G/DL (ref 11.7–15.7)
MCH RBC QN AUTO: 29.7 PG (ref 26.5–33)
MCHC RBC AUTO-ENTMCNC: 31.6 G/DL (ref 31.5–36.5)
MCV RBC AUTO: 94 FL (ref 78–100)
PLATELET # BLD AUTO: 236 10E3/UL (ref 150–450)
RBC # BLD AUTO: 3.54 10E6/UL (ref 3.8–5.2)
RETICS # AUTO: 0.05 10E6/UL (ref 0.03–0.1)
RETICS/RBC NFR AUTO: 1.5 % (ref 0.5–2)
WBC # BLD AUTO: 6.3 10E3/UL (ref 4–11)

## 2023-11-13 PROCEDURE — 84165 PROTEIN E-PHORESIS SERUM: CPT | Performed by: PATHOLOGY

## 2023-11-13 PROCEDURE — 85045 AUTOMATED RETICULOCYTE COUNT: CPT | Performed by: INTERNAL MEDICINE

## 2023-11-13 PROCEDURE — 90662 IIV NO PRSV INCREASED AG IM: CPT | Performed by: INTERNAL MEDICINE

## 2023-11-13 PROCEDURE — 85027 COMPLETE CBC AUTOMATED: CPT | Performed by: INTERNAL MEDICINE

## 2023-11-13 PROCEDURE — 84155 ASSAY OF PROTEIN SERUM: CPT | Performed by: INTERNAL MEDICINE

## 2023-11-13 PROCEDURE — 36415 COLL VENOUS BLD VENIPUNCTURE: CPT | Performed by: INTERNAL MEDICINE

## 2023-11-13 PROCEDURE — 83550 IRON BINDING TEST: CPT | Performed by: INTERNAL MEDICINE

## 2023-11-13 PROCEDURE — 85652 RBC SED RATE AUTOMATED: CPT | Performed by: INTERNAL MEDICINE

## 2023-11-13 PROCEDURE — G0008 ADMIN INFLUENZA VIRUS VAC: HCPCS | Performed by: INTERNAL MEDICINE

## 2023-11-13 PROCEDURE — 99214 OFFICE O/P EST MOD 30 MIN: CPT | Mod: 24 | Performed by: INTERNAL MEDICINE

## 2023-11-13 PROCEDURE — 83540 ASSAY OF IRON: CPT | Performed by: INTERNAL MEDICINE

## 2023-11-13 RX ORDER — BETAMETHASONE DIPROPIONATE 0.5 MG/G
CREAM TOPICAL 2 TIMES DAILY PRN
Qty: 50 G | Refills: 2 | Status: ON HOLD | OUTPATIENT
Start: 2023-11-13 | End: 2024-05-15

## 2023-11-13 RX ORDER — CARVEDILOL 6.25 MG/1
6.25 TABLET ORAL 2 TIMES DAILY WITH MEALS
Qty: 60 TABLET | Refills: 11 | Status: ON HOLD | OUTPATIENT
Start: 2023-11-13 | End: 2024-05-29

## 2023-11-13 RX ORDER — GABAPENTIN 100 MG/1
100 CAPSULE ORAL 3 TIMES DAILY
Qty: 270 CAPSULE | Refills: 3 | Status: ON HOLD | OUTPATIENT
Start: 2023-11-13 | End: 2024-04-22

## 2023-11-13 RX ORDER — LISINOPRIL 5 MG/1
5 TABLET ORAL DAILY
Qty: 30 TABLET | Refills: 11 | Status: SHIPPED | OUTPATIENT
Start: 2023-11-13 | End: 2023-11-21

## 2023-11-13 RX ORDER — RIVAROXABAN 20 MG/1
TABLET, FILM COATED ORAL
COMMUNITY
Start: 2023-10-23 | End: 2023-11-13

## 2023-11-13 ASSESSMENT — PATIENT HEALTH QUESTIONNAIRE - PHQ9: SUM OF ALL RESPONSES TO PHQ QUESTIONS 1-9: 4

## 2023-11-13 NOTE — PROGRESS NOTES
ASSESSMENT:      1. PAD (peripheral artery disease) (H24)  Has been taking Xarelto BID. Informed pt to reduce dose to daily now that has changed from 15mg BID to 20mg tab. Counseled re: smoking cessation. Pain improved in  RLE after surgery  - rivaroxaban ANTICOAGULANT (XARELTO) 20 MG TABS tablet; Take 1 tablet (20 mg) by mouth daily (with dinner)  Dispense: 30 tablet; Refill: 2  - gabapentin (NEURONTIN) 100 MG capsule; Take 1 capsule (100 mg) by mouth 3 times daily  Dispense: 270 capsule; Refill: 3    2. Takotsubo cardiomyopathy  Denies orthopnea, PND currently.  Breathing better.  Echo 11/7/2023 shows normalization of ejection fraction at 55-60% and normal left ventricular function  - carvedilol (COREG) 6.25 MG tablet; Take 1 tablet (6.25 mg) by mouth 2 times daily (with meals)  Dispense: 60 tablet; Refill: 11  lisinopril (ZESTRIL) 5 MG tablet, Take 1 tablet (5 mg) by mouth daily, Disp-30 tablet, R-11     3. Coronary artery disease involving native coronary artery of native heart without angina pectoris  Denies current chest pain.  Continue medical management including statin therapy    4. 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  Now followed by [facility] and has PET scan  scheduled to stage further before treatment decisions made    5. Anemia, unspecified type  Likely secondary to #5. Lab as ordered to assess other causes  - CBC with platelets; Future  - Iron & Iron Binding Capacity; Future  - Protein electrophoresis; Future  - Reticulocyte count; Future  - ESR: Erythrocyte sedimentation rate; Future  - CBC with platelets  - Iron & Iron Binding Capacity  - Protein electrophoresis  - Reticulocyte count  - ESR: Erythrocyte sedimentation rate    6. Flu vaccine need  - INFLUENZA VACCINE 65+ (FLUZONE HD)    7.  Dermatitis  Mild rash on arm. No sign cellulitis. Steroid topical  until resolved  - augmented betamethasone dipropionate (DIPROLENE AF) 0.05 % external cream; Apply topically 2 times daily as needed (skin rash)  Dispense: 50 g; Refill: 2    8. Tobacco use disorder  Strongly counseled regarding smoking cessation.  Patient not wanting to quit at this time despite vascular hx. See plan discussion below      PLAN:   Be sure to take Xarelto 20mg ONCE a day in AM and  not twice a day as previous with the 15mg dose   Flu shot today   Stop ALL use of cigarettes . Consider nicotine gum or lozenge when craving for a cigarettes. May get over the counter  Increase carvedilol to 6.25mg tab, 1 tab twice a day for heart and blood pressure   Continue other medications.  Refills done  See cardiology and vascular and oncology  clinics as scheduled   Labs as ordered    MED REC REQUIRED  Post Medication Reconciliation Status:  Discharge medications reconciled and changed, see notes/orders     (Chart documentation was completed, in part, with [employer] voice-recognition software. Even though reviewed, some grammatical, spelling, and word errors may remain.)    Vasquez Benoit MD  Internal Medicine Department  [facility]      Vishal Watson is a 63 year old, presenting for the following health issues:  Hypertension, Lipids, and Hospital F/U      HPI     Additional concerns: interested in refill for diprolene      Hyperlipidemia Follow-Up    Are you regularly taking any medication or supplement to lower your cholesterol?   Yes- rosuvastatin  Are you having muscle aches or other side effects that you think could be caused by your cholesterol lowering medication?  No    Hypertension Follow-up    Do you check your blood pressure regularly outside of the clinic? Yes   Are you following a low salt diet? Yes  Are your blood pressures ever more than 140 on the top number (systolic) OR more   than 90 on the bottom  number (diastolic), for example 140/90? Yes      Hospital Follow-up Visit:    Hospital/Nursing Home/IP Rehab Facility: [facility]  Date of Admission: 10/3/2023  Date of Discharge: 10/14/2023  Reason(s) for Admission: acute reaction to situational stress    Was your hospitalization related to COVID-19? No   Problems taking medications regularly:  None  Medication changes since discharge: None  Problems adhering to non-medication therapy:  None    Summary of hospitalization:  [facility] discharge summary reviewed  Diagnostic Tests/Treatments reviewed.  Follow up needed: labs  Other Healthcare Providers Involved in Patient s Care:         Vascular  surgery. Oncology, ENT, cardiology  Update since discharge: improved.         Plan of care communicated with patient      History   Smoking Status    Some Days    Packs/day: 0.50    Years: 52.00    Types: Cigarettes    Start date: 1968   Smokeless Tobacco    Never       Most recent lab results reviewed with pt.        Pt s/p surgery RLE vascular. Notes from discharge reviewed. Had some cellulitis at surgical site afterwards but has resolved with abx therapy. Follow up vascular notes reviewed.  Also note from Takasubo CM that has resolved with follow-up ECHO noted. Denies current orthopnea, PND, chest pain.  Mild chronic shortness of breath.  Right lower extremity feeling better.  Minimal claudication symptoms  Denies abdominal pain.  Appetite okay.  Has not diagnosed with lymphoma. Will be having PET scan through [facility] for further staging before decision regarding treatment.  Anemic.  Denies seeing blood in stools  Still smoking 1/2 pack of cigarettes per day  Slight rash right arm    Additional ROS:   Constitutional, HEENT, Cardiovascular, Pulmonary, GI and , Neuro, MSK and Psych review of systems/symptoms are otherwise negative or unchanged from previous, except as noted above.      OBJECTIVE:  /74   Pulse 83   Temp  "97.4  F (36.3  C) (Temporal)   Ht 1.568 m (5' 1.75\")   Wt 51.6 kg (113 lb 11.2 oz)   LMP  (LMP Unknown)   SpO2 99%   BMI 20.96 kg/m     Estimated body mass index is 20.96 kg/m  as calculated from the following:    Height as of this encounter: 1.568 m (5' 1.75\").    Weight as of this encounter: 51.6 kg (113 lb 11.2 oz).     HENT: ear canals and TM's normal and nose and mouth without ulcers or lesions   Neck: no adenopathy. Thyroid normal to palpation. No bruits  Pulm: Lungs clear to auscultation with slight chronic your base.  No wheezes or rhonchi  CV: Regular rates and rhythm  GI: Soft, nontender, Normal active bowel sounds, No hepatosplenomegaly or masses palpable  Ext: Peripheral pulses  reduced but palpable BLE.  Trace BLE ankle edema. Surgical incision RLE intact without erythema.  Mild eczematous dry rash RUE  Neuro: Normal strength and tone, sensory exam grossly normal              "

## 2023-11-14 LAB
ALBUMIN SERPL ELPH-MCNC: 3.4 G/DL (ref 3.7–5.1)
ALPHA1 GLOB SERPL ELPH-MCNC: 0.4 G/DL (ref 0.2–0.4)
ALPHA2 GLOB SERPL ELPH-MCNC: 0.9 G/DL (ref 0.5–0.9)
B-GLOBULIN SERPL ELPH-MCNC: 0.7 G/DL (ref 0.6–1)
GAMMA GLOB SERPL ELPH-MCNC: 0.7 G/DL (ref 0.7–1.6)
IRON BINDING CAPACITY (ROCHE): 363 UG/DL (ref 240–430)
IRON SATN MFR SERPL: 5 % (ref 15–46)
IRON SERPL-MCNC: 18 UG/DL (ref 37–145)
M PROTEIN SERPL ELPH-MCNC: 0 G/DL
PROT PATTERN SERPL ELPH-IMP: ABNORMAL
TOTAL PROTEIN SERUM FOR ELP: 6.1 G/DL (ref 6.4–8.3)

## 2023-11-21 ENCOUNTER — OFFICE VISIT (OUTPATIENT)
Dept: CARDIOLOGY | Facility: CLINIC | Age: 63
End: 2023-11-21
Payer: COMMERCIAL

## 2023-11-21 ENCOUNTER — TELEPHONE (OUTPATIENT)
Dept: OTHER | Facility: CLINIC | Age: 63
End: 2023-11-21

## 2023-11-21 ENCOUNTER — OFFICE VISIT (OUTPATIENT)
Dept: OTHER | Facility: CLINIC | Age: 63
End: 2023-11-21
Attending: SURGERY
Payer: COMMERCIAL

## 2023-11-21 VITALS
HEIGHT: 62 IN | OXYGEN SATURATION: 98 % | HEART RATE: 66 BPM | DIASTOLIC BLOOD PRESSURE: 87 MMHG | SYSTOLIC BLOOD PRESSURE: 204 MMHG | WEIGHT: 111.5 LBS | BODY MASS INDEX: 20.52 KG/M2

## 2023-11-21 VITALS — HEART RATE: 74 BPM | SYSTOLIC BLOOD PRESSURE: 193 MMHG | DIASTOLIC BLOOD PRESSURE: 109 MMHG

## 2023-11-21 DIAGNOSIS — I70.229 CRITICAL ISCHEMIA OF EXTREMITY WITH HISTORY OF REVASCULARIZATION OF SAME EXTREMITY (H): ICD-10-CM

## 2023-11-21 DIAGNOSIS — I65.23 BILATERAL CAROTID ARTERY STENOSIS: ICD-10-CM

## 2023-11-21 DIAGNOSIS — Z98.890 CRITICAL ISCHEMIA OF EXTREMITY WITH HISTORY OF REVASCULARIZATION OF SAME EXTREMITY (H): ICD-10-CM

## 2023-11-21 DIAGNOSIS — I51.81 TAKOTSUBO CARDIOMYOPATHY: ICD-10-CM

## 2023-11-21 DIAGNOSIS — E11.51 TYPE 2 DIABETES MELLITUS WITH DIABETIC PERIPHERAL ANGIOPATHY WITHOUT GANGRENE, WITHOUT LONG-TERM CURRENT USE OF INSULIN (H): ICD-10-CM

## 2023-11-21 DIAGNOSIS — I10 ESSENTIAL HYPERTENSION: Primary | ICD-10-CM

## 2023-11-21 DIAGNOSIS — F17.200 TOBACCO USE DISORDER: ICD-10-CM

## 2023-11-21 DIAGNOSIS — I25.10 CORONARY ARTERY DISEASE INVOLVING NATIVE CORONARY ARTERY OF NATIVE HEART WITHOUT ANGINA PECTORIS: ICD-10-CM

## 2023-11-21 DIAGNOSIS — I73.9 PAD (PERIPHERAL ARTERY DISEASE) (H): Primary | ICD-10-CM

## 2023-11-21 PROCEDURE — 99214 OFFICE O/P EST MOD 30 MIN: CPT | Performed by: NURSE PRACTITIONER

## 2023-11-21 PROCEDURE — 99024 POSTOP FOLLOW-UP VISIT: CPT | Performed by: SURGERY

## 2023-11-21 PROCEDURE — G0463 HOSPITAL OUTPT CLINIC VISIT: HCPCS | Performed by: SURGERY

## 2023-11-21 RX ORDER — LISINOPRIL 10 MG/1
10 TABLET ORAL DAILY
Qty: 30 TABLET | Refills: 4 | Status: ON HOLD | OUTPATIENT
Start: 2023-11-21 | End: 2024-04-22

## 2023-11-21 RX ORDER — AMLODIPINE BESYLATE 2.5 MG/1
2.5 TABLET ORAL DAILY
Qty: 30 TABLET | Refills: 4 | Status: ON HOLD | OUTPATIENT
Start: 2023-11-21 | End: 2024-04-22

## 2023-11-21 NOTE — PATIENT INSTRUCTIONS
Thank you for your visit with the [facility] today.    Today's Summary     Increase Lisinopril to 10 mg daily.  Start Amlodipine 2.5 mg taken once in the evening.  Check labs in 2-3 weeks.  If your kidney function looks good when we check the labs, then we will increase your Lisinopril again depending on how your blood pressure is looking.  Please monitor your blood pressure at home and keep a log.  You should take it in the morning after sitting for 5-minutes of rest.  If you notice headache, vision changes, dizziness or lightheadedness, then also check your blood pressure at that time.  Follow up in 3-months to reassess blood pressure.  Please call 6-months in advance to schedule your appointment.    If you have questions or concerns, please do not hesitate to call my nursing support team at [phone].    Scheduling phone number: [phone]  [facility] Number: [phone]    It was a pleasure seeing you today.     NOELLE Little, CNP  Nurse Practitioner  [facility]  November 21, 2023  ________________________________________________________

## 2023-11-21 NOTE — PROGRESS NOTES
[facility]        Patient is here for a  follow up  to discuss suture removal.    Pt is currently taking Statin, Plavix, and Xarelto.    BP (!) 193/109 (BP Location: Right arm, Patient Position: Chair, Cuff Size: Adult Regular)   Pulse 74   LMP  (LMP Unknown)     The provider has been notified that the patient has no concerns.     Questions patient would like addressed today are: N/A.    Refills are needed: N/A    Has homecare services and agency name:  [facility]

## 2023-11-21 NOTE — TELEPHONE ENCOUNTER
Per 11/21/23 visit with Dr. Lozano, pt needs the following in approximately 4 weeks:    Bilateral lower extremity arterial US  Bilateral carotid US  In clinic visit with Dr. Lozano    Routing to scheduling to contact patient to coordinate above.    Patient states if you do not reach her, you can schedule her for the above appointments and leave a message with the dates and times.    Appt note in order comments.    ESTHER PabonN, RN, -[facility]
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
## 2023-11-21 NOTE — PROGRESS NOTES
[place] [facility]    Shirley Hendricks returns today for suture removal.  She thrombosed her previous right femoral to anterior tibial bypass graft and required a new PTFE graft from the common femoral to mid anterior tibial artery lateral to the patella.    This has been working well.  We kept her on Xarelto.  We were concerned about wound healing and left the nylon sutures until today.    She continues to do well.  She got a brace for her foot due to her Charcot Breonna tooth deformity.    Exam: Alert and appropriate.  Normal affect.   Fully ambulatory.   Wearing a right foot soft brace   +3 right PTFE graft pulse and DP pulse.   Well-healed right anterior tibial incision with mild scab.  No swelling.    No infection.    Sutures removed today.  Scab was removed with a well-healed wound.    IMPRESSION: Doing well.  Follow-up duplex in several weeks.  Due to the PTFE graft and recurrent thrombosis we will keep on Xarelto 20 mg daily.    Chadwick Lozano MD   This note was created using Dragon voice recognition software which may result in transcription errors.
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
## 2023-11-22 ENCOUNTER — TELEPHONE (OUTPATIENT)
Dept: PHARMACY | Facility: OTHER | Age: 63
End: 2023-11-22
Payer: COMMERCIAL

## 2023-11-22 NOTE — TELEPHONE ENCOUNTER
MTM referral from: Patient's insurance ([employer])    MTM referral outreach attempt #2 on December 12, 2023    Outcome: Left Message with phone number that is     [phone] for patient to call and schedule an appointment with specially trained pharmacist to go over her medications to assess if they are indicated, effective, safe, convenient and affordable for you.       To schedule an appointment, please call the clinic MTM services phone number, [phone].  Also you can call the scheduling line at [phone].      I hope to see you soon!     Sincerely,         El Gallagher PharmD     Medication Therapy Management (MTM) Pharmacist                                       MTM Recruitment: [facility] insurance     Referral outreach attempt #1 on November 22, 2023      Outcome: left voicemail and MyChart message sent    Vita Floyd PharmD  Medication Therapy Management Pharmacist   [facility]

## 2023-11-24 NOTE — TELEPHONE ENCOUNTER
Left voicemail explaining the below scheduled:    Future Appointments   Date Time Provider Department Center   [facility]   12/21/2023  1:00 PM [facility]   12/21/2023  2:00 PM Chadwick Lozano MD [facility]

## 2023-11-27 ENCOUNTER — TRANSFERRED RECORDS (OUTPATIENT)
Dept: HEALTH INFORMATION MANAGEMENT | Facility: CLINIC | Age: 63
End: 2023-11-27
Payer: COMMERCIAL

## 2023-12-10 ENCOUNTER — APPOINTMENT (OUTPATIENT)
Dept: ULTRASOUND IMAGING | Facility: CLINIC | Age: 63
DRG: 812 | End: 2023-12-10
Attending: EMERGENCY MEDICINE
Payer: COMMERCIAL

## 2023-12-10 ENCOUNTER — TELEPHONE (OUTPATIENT)
Dept: SURGERY | Facility: CLINIC | Age: 63
End: 2023-12-10

## 2023-12-10 ENCOUNTER — HOSPITAL ENCOUNTER (INPATIENT)
Facility: CLINIC | Age: 63
LOS: 5 days | Discharge: HOME | DRG: 812 | End: 2023-12-15
Attending: EMERGENCY MEDICINE | Admitting: INTERNAL MEDICINE
Payer: COMMERCIAL

## 2023-12-10 ENCOUNTER — APPOINTMENT (OUTPATIENT)
Dept: GENERAL RADIOLOGY | Facility: CLINIC | Age: 63
DRG: 812 | End: 2023-12-10
Attending: EMERGENCY MEDICINE
Payer: COMMERCIAL

## 2023-12-10 DIAGNOSIS — Z79.01 ANTICOAGULATED: ICD-10-CM

## 2023-12-10 DIAGNOSIS — R19.5 OCCULT BLOOD POSITIVE STOOL: ICD-10-CM

## 2023-12-10 DIAGNOSIS — R79.89 ELEVATED TROPONIN: ICD-10-CM

## 2023-12-10 DIAGNOSIS — I73.9 PERIPHERAL VASCULAR DISEASE (H): Primary | ICD-10-CM

## 2023-12-10 DIAGNOSIS — D64.9 ACUTE ON CHRONIC ANEMIA: ICD-10-CM

## 2023-12-10 LAB
ABO/RH(D): NORMAL
ACANTHOCYTES BLD QL SMEAR: NORMAL
ANION GAP SERPL CALCULATED.3IONS-SCNC: 11 MMOL/L (ref 7–15)
ANTIBODY SCREEN: NEGATIVE
ATRIAL RATE - MUSE: 63 BPM
AUER BODIES BLD QL SMEAR: NORMAL
BASO STIPL BLD QL SMEAR: NORMAL
BASOPHILS # BLD AUTO: 0.1 10E3/UL (ref 0–0.2)
BASOPHILS NFR BLD AUTO: 1 %
BITE CELLS BLD QL SMEAR: NORMAL
BLD PROD TYP BPU: NORMAL
BLD PROD TYP BPU: NORMAL
BLISTER CELLS BLD QL SMEAR: NORMAL
BLOOD COMPONENT TYPE: NORMAL
BLOOD COMPONENT TYPE: NORMAL
BUN SERPL-MCNC: 23.4 MG/DL (ref 8–23)
BURR CELLS BLD QL SMEAR: NORMAL
CALCIUM SERPL-MCNC: 8.9 MG/DL (ref 8.8–10.2)
CHLORIDE SERPL-SCNC: 100 MMOL/L (ref 98–107)
CODING SYSTEM: NORMAL
CODING SYSTEM: NORMAL
CREAT SERPL-MCNC: 0.79 MG/DL (ref 0.51–0.95)
CROSSMATCH: NORMAL
CROSSMATCH: NORMAL
DACRYOCYTES BLD QL SMEAR: NORMAL
DEPRECATED HCO3 PLAS-SCNC: 20 MMOL/L (ref 22–29)
DIASTOLIC BLOOD PRESSURE - MUSE: NORMAL MMHG
EGFRCR SERPLBLD CKD-EPI 2021: 83 ML/MIN/1.73M2
ELLIPTOCYTES BLD QL SMEAR: NORMAL
EOSINOPHIL # BLD AUTO: 0.3 10E3/UL (ref 0–0.7)
EOSINOPHIL NFR BLD AUTO: 5 %
ERYTHROCYTE [DISTWIDTH] IN BLOOD BY AUTOMATED COUNT: 14.6 % (ref 10–15)
FRAGMENTS BLD QL SMEAR: NORMAL
GLUCOSE BLDC GLUCOMTR-MCNC: 119 MG/DL (ref 70–99)
GLUCOSE SERPL-MCNC: 94 MG/DL (ref 70–99)
HCT VFR BLD AUTO: 19.2 % (ref 35–47)
HEMOCCULT STL QL: POSITIVE
HGB BLD-MCNC: 6 G/DL (ref 11.7–15.7)
HGB BLD-MCNC: 6.6 G/DL (ref 11.7–15.7)
HGB C CRYSTALS: NORMAL
HOLD SPECIMEN: NORMAL
HOWELL-JOLLY BOD BLD QL SMEAR: NORMAL
IMM GRANULOCYTES # BLD: 0 10E3/UL
IMM GRANULOCYTES NFR BLD: 0 %
INTERPRETATION ECG - MUSE: NORMAL
ISSUE DATE AND TIME: NORMAL
ISSUE DATE AND TIME: NORMAL
LYMPHOCYTES # BLD AUTO: 1.2 10E3/UL (ref 0.8–5.3)
LYMPHOCYTES NFR BLD AUTO: 23 %
MCH RBC QN AUTO: 27.8 PG (ref 26.5–33)
MCHC RBC AUTO-ENTMCNC: 31.3 G/DL (ref 31.5–36.5)
MCV RBC AUTO: 89 FL (ref 78–100)
MONOCYTES # BLD AUTO: 0.4 10E3/UL (ref 0–1.3)
MONOCYTES NFR BLD AUTO: 8 %
NEUTROPHILS # BLD AUTO: 3.4 10E3/UL (ref 1.6–8.3)
NEUTROPHILS NFR BLD AUTO: 63 %
NEUTS HYPERSEG BLD QL SMEAR: NORMAL
NRBC # BLD AUTO: 0 10E3/UL
NRBC BLD AUTO-RTO: 0 /100
P AXIS - MUSE: 17 DEGREES
PLAT MORPH BLD: NORMAL
PLATELET # BLD AUTO: 259 10E3/UL (ref 150–450)
POLYCHROMASIA BLD QL SMEAR: NORMAL
POTASSIUM SERPL-SCNC: 4.5 MMOL/L (ref 3.4–5.3)
PR INTERVAL - MUSE: 136 MS
QRS DURATION - MUSE: 74 MS
QT - MUSE: 412 MS
QTC - MUSE: 421 MS
R AXIS - MUSE: 48 DEGREES
RBC # BLD AUTO: 2.16 10E6/UL (ref 3.8–5.2)
RBC AGGLUT BLD QL: NORMAL
RBC MORPH BLD: NORMAL
ROULEAUX BLD QL SMEAR: NORMAL
SICKLE CELLS BLD QL SMEAR: NORMAL
SMUDGE CELLS BLD QL SMEAR: NORMAL
SODIUM SERPL-SCNC: 131 MMOL/L (ref 135–145)
SPECIMEN EXPIRATION DATE: NORMAL
SPHEROCYTES BLD QL SMEAR: NORMAL
STOMATOCYTES BLD QL SMEAR: NORMAL
SYSTOLIC BLOOD PRESSURE - MUSE: NORMAL MMHG
T AXIS - MUSE: 8 DEGREES
TARGETS BLD QL SMEAR: NORMAL
TOXIC GRANULES BLD QL SMEAR: NORMAL
TROPONIN T SERPL HS-MCNC: 21 NG/L
TROPONIN T SERPL HS-MCNC: 21 NG/L
UNIT ABO/RH: NORMAL
UNIT ABO/RH: NORMAL
UNIT NUMBER: NORMAL
UNIT NUMBER: NORMAL
UNIT STATUS: NORMAL
UNIT STATUS: NORMAL
UNIT TYPE ISBT: 6200
UNIT TYPE ISBT: 6200
VARIANT LYMPHS BLD QL SMEAR: NORMAL
VENTRICULAR RATE- MUSE: 63 BPM
WBC # BLD AUTO: 5.4 10E3/UL (ref 4–11)

## 2023-12-10 PROCEDURE — 99285 EMERGENCY DEPT VISIT HI MDM: CPT | Mod: 25

## 2023-12-10 PROCEDURE — P9016 RBC LEUKOCYTES REDUCED: HCPCS | Performed by: EMERGENCY MEDICINE

## 2023-12-10 PROCEDURE — 93925 LOWER EXTREMITY STUDY: CPT

## 2023-12-10 PROCEDURE — 84484 ASSAY OF TROPONIN QUANT: CPT | Performed by: EMERGENCY MEDICINE

## 2023-12-10 PROCEDURE — 250N000011 HC RX IP 250 OP 636: Mod: JZ | Performed by: EMERGENCY MEDICINE

## 2023-12-10 PROCEDURE — 71046 X-RAY EXAM CHEST 2 VIEWS: CPT

## 2023-12-10 PROCEDURE — 258N000003 HC RX IP 258 OP 636: Performed by: EMERGENCY MEDICINE

## 2023-12-10 PROCEDURE — 86923 COMPATIBILITY TEST ELECTRIC: CPT | Performed by: INTERNAL MEDICINE

## 2023-12-10 PROCEDURE — 86900 BLOOD TYPING SEROLOGIC ABO: CPT | Performed by: EMERGENCY MEDICINE

## 2023-12-10 PROCEDURE — 93005 ELECTROCARDIOGRAM TRACING: CPT

## 2023-12-10 PROCEDURE — 99223 1ST HOSP IP/OBS HIGH 75: CPT | Performed by: INTERNAL MEDICINE

## 2023-12-10 PROCEDURE — 250N000011 HC RX IP 250 OP 636: Performed by: INTERNAL MEDICINE

## 2023-12-10 PROCEDURE — 86923 COMPATIBILITY TEST ELECTRIC: CPT | Performed by: EMERGENCY MEDICINE

## 2023-12-10 PROCEDURE — 96375 TX/PRO/DX INJ NEW DRUG ADDON: CPT

## 2023-12-10 PROCEDURE — 82272 OCCULT BLD FECES 1-3 TESTS: CPT | Performed by: EMERGENCY MEDICINE

## 2023-12-10 PROCEDURE — 250N000013 HC RX MED GY IP 250 OP 250 PS 637: Performed by: EMERGENCY MEDICINE

## 2023-12-10 PROCEDURE — 999N000040 HC STATISTIC CONSULT NO CHARGE VASC ACCESS

## 2023-12-10 PROCEDURE — 85025 COMPLETE CBC W/AUTO DIFF WBC: CPT | Performed by: EMERGENCY MEDICINE

## 2023-12-10 PROCEDURE — 36430 TRANSFUSION BLD/BLD COMPNT: CPT

## 2023-12-10 PROCEDURE — 250N000013 HC RX MED GY IP 250 OP 250 PS 637: Performed by: INTERNAL MEDICINE

## 2023-12-10 PROCEDURE — 120N000013 HC R&B IMCU

## 2023-12-10 PROCEDURE — 85018 HEMOGLOBIN: CPT | Performed by: INTERNAL MEDICINE

## 2023-12-10 PROCEDURE — 36415 COLL VENOUS BLD VENIPUNCTURE: CPT | Performed by: INTERNAL MEDICINE

## 2023-12-10 PROCEDURE — 96374 THER/PROPH/DIAG INJ IV PUSH: CPT

## 2023-12-10 PROCEDURE — 80048 BASIC METABOLIC PNL TOTAL CA: CPT | Performed by: EMERGENCY MEDICINE

## 2023-12-10 PROCEDURE — 99418 PROLNG IP/OBS E/M EA 15 MIN: CPT | Performed by: INTERNAL MEDICINE

## 2023-12-10 PROCEDURE — 36415 COLL VENOUS BLD VENIPUNCTURE: CPT | Performed by: EMERGENCY MEDICINE

## 2023-12-10 RX ORDER — LIDOCAINE 40 MG/G
CREAM TOPICAL
Status: DISCONTINUED | OUTPATIENT
Start: 2023-12-10 | End: 2023-12-11

## 2023-12-10 RX ORDER — ROSUVASTATIN CALCIUM 20 MG/1
40 TABLET, COATED ORAL AT BEDTIME
Status: DISCONTINUED | OUTPATIENT
Start: 2023-12-10 | End: 2023-12-15 | Stop reason: HOSPADM

## 2023-12-10 RX ORDER — GABAPENTIN 100 MG/1
100 CAPSULE ORAL 3 TIMES DAILY
Status: DISCONTINUED | OUTPATIENT
Start: 2023-12-10 | End: 2023-12-15 | Stop reason: HOSPADM

## 2023-12-10 RX ORDER — NITROGLYCERIN 0.4 MG/1
0.4 TABLET SUBLINGUAL EVERY 5 MIN PRN
Status: DISCONTINUED | OUTPATIENT
Start: 2023-12-10 | End: 2023-12-15 | Stop reason: HOSPADM

## 2023-12-10 RX ORDER — AMOXICILLIN 250 MG
2 CAPSULE ORAL 2 TIMES DAILY PRN
Status: DISCONTINUED | OUTPATIENT
Start: 2023-12-10 | End: 2023-12-15 | Stop reason: HOSPADM

## 2023-12-10 RX ORDER — NICOTINE 21 MG/24HR
1 PATCH, TRANSDERMAL 24 HOURS TRANSDERMAL DAILY
Status: DISCONTINUED | OUTPATIENT
Start: 2023-12-10 | End: 2023-12-15 | Stop reason: HOSPADM

## 2023-12-10 RX ORDER — NALOXONE HYDROCHLORIDE 0.4 MG/ML
0.4 INJECTION, SOLUTION INTRAMUSCULAR; INTRAVENOUS; SUBCUTANEOUS
Status: DISCONTINUED | OUTPATIENT
Start: 2023-12-10 | End: 2023-12-15 | Stop reason: HOSPADM

## 2023-12-10 RX ORDER — AMOXICILLIN 250 MG
1 CAPSULE ORAL DAILY PRN
Status: DISCONTINUED | OUTPATIENT
Start: 2023-12-10 | End: 2023-12-10

## 2023-12-10 RX ORDER — NALOXONE HYDROCHLORIDE 0.4 MG/ML
0.2 INJECTION, SOLUTION INTRAMUSCULAR; INTRAVENOUS; SUBCUTANEOUS
Status: DISCONTINUED | OUTPATIENT
Start: 2023-12-10 | End: 2023-12-15 | Stop reason: HOSPADM

## 2023-12-10 RX ORDER — PROCHLORPERAZINE MALEATE 5 MG
5 TABLET ORAL EVERY 6 HOURS PRN
Status: DISCONTINUED | OUTPATIENT
Start: 2023-12-10 | End: 2023-12-15 | Stop reason: HOSPADM

## 2023-12-10 RX ORDER — NICOTINE 21 MG/24HR
1 PATCH, TRANSDERMAL 24 HOURS TRANSDERMAL DAILY
Status: DISCONTINUED | OUTPATIENT
Start: 2023-12-11 | End: 2023-12-10

## 2023-12-10 RX ORDER — POLYETHYLENE GLYCOL 3350 17 G/17G
17 POWDER, FOR SOLUTION ORAL 2 TIMES DAILY PRN
Status: DISCONTINUED | OUTPATIENT
Start: 2023-12-10 | End: 2023-12-15 | Stop reason: HOSPADM

## 2023-12-10 RX ORDER — AMOXICILLIN 250 MG
1 CAPSULE ORAL 2 TIMES DAILY PRN
Status: DISCONTINUED | OUTPATIENT
Start: 2023-12-10 | End: 2023-12-15 | Stop reason: HOSPADM

## 2023-12-10 RX ORDER — CALCIUM CARBONATE 500 MG/1
1000 TABLET, CHEWABLE ORAL 4 TIMES DAILY PRN
Status: DISCONTINUED | OUTPATIENT
Start: 2023-12-10 | End: 2023-12-15 | Stop reason: HOSPADM

## 2023-12-10 RX ORDER — AMOXICILLIN 250 MG
1 CAPSULE ORAL 2 TIMES DAILY
Status: DISCONTINUED | OUTPATIENT
Start: 2023-12-10 | End: 2023-12-15 | Stop reason: HOSPADM

## 2023-12-10 RX ORDER — LORAZEPAM 0.5 MG/1
.5-1 TABLET ORAL EVERY 4 HOURS PRN
Status: DISCONTINUED | OUTPATIENT
Start: 2023-12-10 | End: 2023-12-15 | Stop reason: HOSPADM

## 2023-12-10 RX ORDER — ACETAMINOPHEN 500 MG
1000 TABLET ORAL EVERY 8 HOURS
Status: DISCONTINUED | OUTPATIENT
Start: 2023-12-10 | End: 2023-12-15 | Stop reason: HOSPADM

## 2023-12-10 RX ORDER — OXYCODONE HYDROCHLORIDE 5 MG/1
5 TABLET ORAL EVERY 4 HOURS PRN
Status: DISCONTINUED | OUTPATIENT
Start: 2023-12-10 | End: 2023-12-15 | Stop reason: HOSPADM

## 2023-12-10 RX ORDER — AMLODIPINE BESYLATE 2.5 MG/1
2.5 TABLET ORAL DAILY
Status: DISCONTINUED | OUTPATIENT
Start: 2023-12-11 | End: 2023-12-15 | Stop reason: HOSPADM

## 2023-12-10 RX ORDER — HEPARIN SODIUM 10000 [USP'U]/100ML
0-5000 INJECTION, SOLUTION INTRAVENOUS CONTINUOUS
Status: DISPENSED | OUTPATIENT
Start: 2023-12-10 | End: 2023-12-15

## 2023-12-10 RX ORDER — FERROUS SULFATE 325(65) MG
325 TABLET ORAL EVERY OTHER DAY
Status: DISCONTINUED | OUTPATIENT
Start: 2023-12-11 | End: 2023-12-15 | Stop reason: HOSPADM

## 2023-12-10 RX ORDER — PROCHLORPERAZINE 25 MG
12.5 SUPPOSITORY, RECTAL RECTAL EVERY 12 HOURS PRN
Status: DISCONTINUED | OUTPATIENT
Start: 2023-12-10 | End: 2023-12-15 | Stop reason: HOSPADM

## 2023-12-10 RX ORDER — ONDANSETRON 4 MG/1
4 TABLET, ORALLY DISINTEGRATING ORAL EVERY 6 HOURS PRN
Status: DISCONTINUED | OUTPATIENT
Start: 2023-12-10 | End: 2023-12-15 | Stop reason: HOSPADM

## 2023-12-10 RX ORDER — ONDANSETRON 2 MG/ML
4 INJECTION INTRAMUSCULAR; INTRAVENOUS EVERY 6 HOURS PRN
Status: DISCONTINUED | OUTPATIENT
Start: 2023-12-10 | End: 2023-12-15 | Stop reason: HOSPADM

## 2023-12-10 RX ORDER — TRIAMCINOLONE ACETONIDE 1 MG/G
OINTMENT TOPICAL 2 TIMES DAILY PRN
Status: DISCONTINUED | OUTPATIENT
Start: 2023-12-10 | End: 2023-12-15 | Stop reason: HOSPADM

## 2023-12-10 RX ORDER — HYDROMORPHONE HYDROCHLORIDE 1 MG/ML
0.3 INJECTION, SOLUTION INTRAMUSCULAR; INTRAVENOUS; SUBCUTANEOUS
Status: DISCONTINUED | OUTPATIENT
Start: 2023-12-10 | End: 2023-12-15 | Stop reason: HOSPADM

## 2023-12-10 RX ORDER — CARVEDILOL 6.25 MG/1
6.25 TABLET ORAL 2 TIMES DAILY WITH MEALS
Status: DISCONTINUED | OUTPATIENT
Start: 2023-12-10 | End: 2023-12-15 | Stop reason: HOSPADM

## 2023-12-10 RX ORDER — POLYETHYLENE GLYCOL 3350 17 G/17G
17 POWDER, FOR SOLUTION ORAL DAILY PRN
Status: DISCONTINUED | OUTPATIENT
Start: 2023-12-10 | End: 2023-12-10

## 2023-12-10 RX ORDER — AMOXICILLIN 250 MG
1 CAPSULE ORAL AT BEDTIME
Status: ON HOLD | COMMUNITY
End: 2024-05-15

## 2023-12-10 RX ORDER — FLUTICASONE FUROATE AND VILANTEROL 200; 25 UG/1; UG/1
1 POWDER RESPIRATORY (INHALATION) DAILY
Status: DISCONTINUED | OUTPATIENT
Start: 2023-12-11 | End: 2023-12-15 | Stop reason: HOSPADM

## 2023-12-10 RX ORDER — LORAZEPAM 2 MG/ML
.5-1 INJECTION INTRAMUSCULAR EVERY 4 HOURS PRN
Status: DISCONTINUED | OUTPATIENT
Start: 2023-12-10 | End: 2023-12-10

## 2023-12-10 RX ORDER — OXYCODONE HYDROCHLORIDE 5 MG/1
5 TABLET ORAL ONCE
Status: COMPLETED | OUTPATIENT
Start: 2023-12-10 | End: 2023-12-10

## 2023-12-10 RX ADMIN — GABAPENTIN 100 MG: 100 CAPSULE ORAL at 23:13

## 2023-12-10 RX ADMIN — ACETAMINOPHEN 1000 MG: 500 TABLET, FILM COATED ORAL at 23:13

## 2023-12-10 RX ADMIN — LORAZEPAM 1 MG: 2 INJECTION INTRAMUSCULAR; INTRAVENOUS at 20:39

## 2023-12-10 RX ADMIN — OXYCODONE HYDROCHLORIDE 5 MG: 5 TABLET ORAL at 16:54

## 2023-12-10 RX ADMIN — FAMOTIDINE 20 MG: 10 INJECTION INTRAVENOUS at 19:43

## 2023-12-10 RX ADMIN — DOCUSATE SODIUM 50 MG AND SENNOSIDES 8.6 MG 1 TABLET: 8.6; 5 TABLET, FILM COATED ORAL at 23:21

## 2023-12-10 RX ADMIN — HEPARIN SODIUM 600 UNITS/HR: 10000 INJECTION, SOLUTION INTRAVENOUS at 20:46

## 2023-12-10 RX ADMIN — CARVEDILOL 6.25 MG: 6.25 TABLET, FILM COATED ORAL at 23:13

## 2023-12-10 RX ADMIN — ROSUVASTATIN CALCIUM 40 MG: 20 TABLET, FILM COATED ORAL at 23:13

## 2023-12-10 RX ADMIN — NICOTINE 1 PATCH: 14 PATCH, EXTENDED RELEASE TRANSDERMAL at 20:40

## 2023-12-10 RX ADMIN — SODIUM CHLORIDE 250 ML: 9 INJECTION, SOLUTION INTRAVENOUS at 21:15

## 2023-12-10 ASSESSMENT — ACTIVITIES OF DAILY LIVING (ADL)
ADLS_ACUITY_SCORE: 35

## 2023-12-10 NOTE — ED PROVIDER NOTES
"  History     Chief Complaint:  Leg Pain    The history is provided by the patient.      Shirley Hendricks is a 63 year old female with extensive history of peripheral artery disease most recently status post PTFE graft from the right common femoral to mid anterior tibial artery on Xarelto (no missed doses) and with history of Takotsubo cardiomyopathy with recovered EF who presents with a friend for evaluation of leg pain.  She has had 1 week of pain in her right lower extremity, primarily in the posterior thigh.  Initially it was just with walking but is now at rest, currently 7 out of 10. She does not have change in her chronic numbness.  She has not had chest pain but does have new dyspnea on exertion starting this week.  There is no fever, vomiting, and only her \"normal\" cough.  She has been using Tylenol for pain.  She contacted vascular surgery who recommended she be seen in the emergency department.    Of note, she started iron today because she thought it might help with her \"sluggishness\".  She has not seen black stools.  Once recently she saw a little bit of pink in her stool.    Independent Historian:   As above.    Review of External Notes:   I reviewed the 11/21/23 vascular surgery note with Dr. Lozano.  I reviewed the 11/21/23 cardiology note to follow up for stress cardiomyopathy.  Telephone visit today.    Medications:    Norvasc  Coreg  Plavix  Prolia  Benadryl  Jardiance  Neurontin  Zestril  Nicoderm  Nitrostat  Prilosec  Xarelto  Crestor    Past Medical History:    Anxiety  Bilateral carpal tunnel syndrome  CMT disease  COPD  Discoid lupus erythematosus of eyelid  Embolism and thrombosis of unspecified artery  GERD  Hyperlipidemia  Hypertension  Lupus  MDD  MI  Osteoarthrosis  PAD  Peripheral vascular disease  Post-menopausal  Reflux esophagitis  SBO  SVT  Thrombocytopenia  Uncomplicated asthma  Vitamin C deficiency  Cervicalgia  DDD, lumbar  CAD  Tobacco use disorder  NSTEMI  Critical ischemia " of extremity with history of revascularization of same extremity    Past Surgical History:    Angiogram  Angiogram, right  Biopsy mass neck, right  Bypass graft femoropopliteal, right x4  Bypass graft insitu femoropopliteal  Cardiac catheterization  Carpal tunnel release RT/LT  Coronary angiogram  PCI  Embolectomy lower extremity, right  Endarterectomy carotid, right  Endarterectomy carotid, left  Fine needle aspiration without imaging guidance, right  Fluorescence intraoperative vascular angiography, right  Left salpingectomy  Angiogram through catheter (arterial)   Lower extremity angiogram right x2  OR angiogram x2  Laparoscopic cholecystectomy  Laparoscopy diagnostic (general)  Angiogram, lower extremity  Left knee surgery  Repair aneurysm femoral artery  Vascular surgery  Fabric wrapping of abdominal aneurysm  Angioplasty with stent right femoral artery  Sinus surgery  Emergent left groin exploration with oversewing of bleeding angiographic site  Endarterectomy of common femoral-proximal superficial femoral artery with greater saphenous vein patch angioplasty  North of accessory right greater saphenous vein  Occluded left common iliac and external iliac arteries were successfully revascularized with stenting to 8 and 7 mm     Physical Exam   Patient Vitals for the past 24 hrs:   BP Temp Temp src Pulse Resp SpO2 Weight   12/1958 (!) 140/69 -- -- 74 13 100 % --   12/10/23 1956 -- -- -- 69 16 99 % --   12/10/23 1954 (!) 140/69 -- -- 70 -- 99 % --   12/10/23 1949 (!) 151/72 -- -- 64 12 -- --   12/10/23 1947 (!) 151/72 97.7  F (36.5  C) -- 68 15 -- --   12/10/23 1700 -- -- -- -- -- 100 % --   12/10/23 1557 (!) 127/37 98.1  F (36.7  C) Temporal 64 14 100 % 50.8 kg (112 lb)     Physical Exam  General: Well-developed and well-nourished. Well appearing middle-aged  woman. Cooperative.  Head:  Atraumatic.  Eyes:  Conjunctivae, lids, and sclerae are normal.  ENT:    Normal nose. Moist mucous  membranes.  Neck:  Supple. Normal range of motion.  CV:  Regular rate and rhythm. Normal heart sounds with no murmurs, rubs, or gallops detected.  Palpable pulsations of the right lower extremity graft at the level of the mid leg.  Resp:  No respiratory distress. Clear to auscultation bilaterally without decreased breath sounds, wheezing, rales, or rhonchi.  GI:  Soft. Non-distended. Non-tender.    Rectal: JUDE without perianal masses or tenderness.  Return of dark brown flecks of stool without sergei blood.  MS:  Normal ROM. No bilateral lower extremity edema.  Skin:  Warm. Non-diaphoretic. No pallor.  Small scab over the right lateral mid leg.  No inflammatory changes.  Neuro:  Awake. A&Ox3. Normal strength.  Psych: Normal mood and affect. Normal speech.  Vitals reviewed.    Emergency Department Course   EKG  Indication: shortness of breath  Time: 1645  Rate 63 bpm. FL interval 136. QRS duration 74. QT/QTc 412/421.   Normal sinus rhythm  Low voltage QRS  Nonspecific ST abnormality  Abnormal ECG   No acute ST changes.  No significant change as compared to prior, dated 10/3/23.    Imaging:  US Lower Extremity Arterial Duplex Bilateral   Final Result   IMPRESSION:   1.  Patent bilateral vascular arterial grafts as above without evidence of critical stenosis or occlusion.         XR Chest 2 Views   Final Result   IMPRESSION: No evidence of acute cardiopulmonary disease. Cholecystectomy.         Laboratory:  Labs Ordered and Resulted from Time of ED Arrival to Time of ED Departure   BASIC METABOLIC PANEL - Abnormal       Result Value    Sodium 131 (*)     Potassium 4.5      Chloride 100      Carbon Dioxide (CO2) 20 (*)     Anion Gap 11      Urea Nitrogen 23.4 (*)     Creatinine 0.79      GFR Estimate 83      Calcium 8.9      Glucose 94     TROPONIN T, HIGH SENSITIVITY - Abnormal    Troponin T, High Sensitivity 21 (*)    CBC WITH PLATELETS AND DIFFERENTIAL - Abnormal    WBC Count 5.4      RBC Count 2.16 (*)     Hemoglobin  6.0 (*)     Hematocrit 19.2 (*)     MCV 89      MCH 27.8      MCHC 31.3 (*)     RDW 14.6      Platelet Count 259      % Neutrophils 63      % Lymphocytes 23      % Monocytes 8      % Eosinophils 5      % Basophils 1      % Immature Granulocytes 0      NRBCs per 100 WBC 0      Absolute Neutrophils 3.4      Absolute Lymphocytes 1.2      Absolute Monocytes 0.4      Absolute Eosinophils 0.3      Absolute Basophils 0.1      Absolute Immature Granulocytes 0.0      Absolute NRBCs 0.0     TROPONIN T, HIGH SENSITIVITY - Abnormal    Troponin T, High Sensitivity 21 (*)    OCCULT BLOOD STOOL - Abnormal    Occult Blood Positive (*)    RBC AND PLATELET MORPHOLOGY    Platelet Assessment        Value: Automated Count Confirmed. Platelet morphology is normal.    Acanthocytes        Miguelina Rods        Basophilic Stippling        Bite Cells        Blister Cells        [place] Cells        Elliptocytes        Hgb C Crystals        Ruggiero-Jolly Bodies        Hypersegmented Neutrophils        Polychromasia        RBC agglutination        RBC Fragments        Reactive Lymphocytes        Rouleaux        Sickle Cells        Smudge Cells        Spherocytes        Stomatocytes        Target Cells        Teardrop Cells        Toxic Neutrophils        RBC Morphology Confirmed RBC Indices     TYPE AND SCREEN, ADULT    ABO/RH(D) A POS      Antibody Screen Negative      SPECIMEN EXPIRATION DATE [id number]     PREPARE RED BLOOD CELLS (UNIT)    Blood Component Type Red Blood Cells      Product Code [id number]      Unit Status Transfused      Unit Number [id number]      CROSSMATCH Compatible      CODING SYSTEM [id number]      ISSUE DATE AND TIME [id number]      UNIT ABO/RH A+      UNIT TYPE ISBT 6200     PREPARE RED BLOOD CELLS (UNIT)   TRANSFUSE RED BLOOD CELLS (UNIT)   ABO/RH TYPE AND SCREEN      Emergency Department Course & Assessments:  Interventions:  Medications   heparin infusion 25,000 units in D5W 250 mL ANTICOAGULANT (600 Units/hr  Intravenous $New Bag 12/10/23 2046)   nicotine (NICODERM CQ) 14 MG/24HR 24 hr patch 1 patch (1 patch Transdermal $Patch/Med Applied 12/10/23 2040)   oxyCODONE (ROXICODONE) tablet 5 mg (5 mg Oral $Given 12/10/23 1654)   famotidine (PEPCID) injection 20 mg (20 mg Intravenous $Given 12/10/23 1943)      Assessments:  1638 I obtained history and examined the patient as noted above.   1849 I updated patient on results thus far.  Written and verbal consent for blood transfusion obtained.  Rectal exam performed with MARISSA Bradley as chaperone.    Independent Interpretation (X-rays, CTs, rhythm strip):  Interpreted chest x-ray and see no lobar pneumonia or pneumothorax.    Consultations/Discussion of Management or Tests:  2005 I spoke with Dr. Peraza, hospitalist, who accepts admission.  She recommends heparin infusion.    Social Determinants of Health affecting care:   Supportive friend and daughter.  Establish care providers.    Disposition:  Admitted to Dr. Peraza.   Impression & Plan    Medical Decision Making:  Shirley is a 63 year old woman with PAD including recent PTFE graft due to thrombosing previous bypass graft of the right femoral to anterior tibial artery on Xarelto who presents with 1 week of worsening right lower extremity pain and shortness of breath.  Both of these were initially present with exertion only and now her right lower extremity pain is present even at rest.  On arrival she is well-appearing with moderate hypertension.  She has palpable pulses of the graft down to the level of the mid leg and the extremity is warm and well-perfused.    EKG is reassuring without acute ST changes or arrhythmias.  Troponin is elevated minimally at 21 with repeat unchanged.  I think this is likely demand ischemia.  I do not think her dyspnea is related to ACS.  Rather, this is due to anemia with hemoglobin today of 6.0.  Her baseline is about 9.5-10.5.  She denies black or bloody stools but is Hemoccult positive suggesting an  upper GI bleed.  She was not given Protonix due to allergy but was given Pepcid.  Chest x-ray does not reveal alternate cause for dyspnea such as pneumonia or pneumothorax.  Lower extremity arterial duplex does not reveal reocclusion of her grafts.  It is imperative that she remain on anticoagulants given her history of thrombosing her grafts.  After discussion with the hospitalist service we have elected to started her on heparin despite evidence of upper GI bleed.  Benefit outweighs risk as she is hemodynamically stable and heparin can easily be reversed and/or stopped.  She was transfused 1 unit of PRBCs.  The remainder of her laboratory studies are reassuring without kidney injury or significant electrolyte derangements.  There is no leukocytosis.  She requires hospitalization for attention to a likely GI bleed in the setting of strong indication for anticoagulation to continue.  She was given oxycodone for pain but required no further interventions.  As above, I spoke with Dr. Peraza, hospitalist, who accepts admission.  I updated the patient and answered all her questions.    Diagnosis:    ICD-10-CM    1. Acute on chronic anemia  D64.9       2. Occult blood positive stool  R19.5       3. Elevated troponin  R79.89       4. Anticoagulated  Z79.01          Scribe Disclosure:  I, Byron Suazo, am serving as a scribe at 4:09 PM on 12/10/2023 to document services personally performed by Marzena Emery MD based on my observations and the provider's statements to me.   12/10/2023   Marzena Emery MD Dixson, Kylie S, MD  12/10/23 2121

## 2023-12-10 NOTE — ED TRIAGE NOTES
Right leg pain started one week, recent bypass surgery, sent by vascular md to evaluate for an occlusion, is taking both her blood thinners

## 2023-12-11 LAB
ANION GAP SERPL CALCULATED.3IONS-SCNC: 10 MMOL/L (ref 7–15)
APTT PPP: 146 SECONDS (ref 22–38)
APTT PPP: 47 SECONDS (ref 22–38)
APTT PPP: 49 SECONDS (ref 22–38)
BUN SERPL-MCNC: 24.9 MG/DL (ref 8–23)
CALCIUM SERPL-MCNC: 8.3 MG/DL (ref 8.8–10.2)
CHLORIDE SERPL-SCNC: 104 MMOL/L (ref 98–107)
CREAT SERPL-MCNC: 0.87 MG/DL (ref 0.51–0.95)
DEPRECATED HCO3 PLAS-SCNC: 19 MMOL/L (ref 22–29)
EGFRCR SERPLBLD CKD-EPI 2021: 74 ML/MIN/1.73M2
ERYTHROCYTE [DISTWIDTH] IN BLOOD BY AUTOMATED COUNT: 14.6 % (ref 10–15)
GLUCOSE SERPL-MCNC: 86 MG/DL (ref 70–99)
HCT VFR BLD AUTO: 24.3 % (ref 35–47)
HGB BLD-MCNC: 8 G/DL (ref 11.7–15.7)
HGB BLD-MCNC: 8.6 G/DL (ref 11.7–15.7)
HGB BLD-MCNC: 8.9 G/DL (ref 11.7–15.7)
IRON BINDING CAPACITY (ROCHE): 376 UG/DL (ref 240–430)
IRON SATN MFR SERPL: 32 % (ref 15–46)
IRON SERPL-MCNC: 122 UG/DL (ref 37–145)
MCH RBC QN AUTO: 28.2 PG (ref 26.5–33)
MCHC RBC AUTO-ENTMCNC: 32.9 G/DL (ref 31.5–36.5)
MCV RBC AUTO: 86 FL (ref 78–100)
PLATELET # BLD AUTO: 204 10E3/UL (ref 150–450)
POTASSIUM SERPL-SCNC: 4.4 MMOL/L (ref 3.4–5.3)
RBC # BLD AUTO: 2.84 10E6/UL (ref 3.8–5.2)
RETICS # AUTO: 0.05 10E6/UL (ref 0.03–0.1)
RETICS/RBC NFR AUTO: 1.9 % (ref 0.5–2)
SODIUM SERPL-SCNC: 133 MMOL/L (ref 135–145)
WBC # BLD AUTO: 4.8 10E3/UL (ref 4–11)

## 2023-12-11 PROCEDURE — 36569 INSJ PICC 5 YR+ W/O IMAGING: CPT

## 2023-12-11 PROCEDURE — 250N000013 HC RX MED GY IP 250 OP 250 PS 637: Performed by: INTERNAL MEDICINE

## 2023-12-11 PROCEDURE — 80048 BASIC METABOLIC PNL TOTAL CA: CPT | Performed by: INTERNAL MEDICINE

## 2023-12-11 PROCEDURE — 36415 COLL VENOUS BLD VENIPUNCTURE: CPT | Performed by: INTERNAL MEDICINE

## 2023-12-11 PROCEDURE — P9016 RBC LEUKOCYTES REDUCED: HCPCS | Performed by: INTERNAL MEDICINE

## 2023-12-11 PROCEDURE — 85018 HEMOGLOBIN: CPT | Performed by: INTERNAL MEDICINE

## 2023-12-11 PROCEDURE — 83010 ASSAY OF HAPTOGLOBIN QUANT: CPT | Performed by: INTERNAL MEDICINE

## 2023-12-11 PROCEDURE — 82728 ASSAY OF FERRITIN: CPT | Performed by: INTERNAL MEDICINE

## 2023-12-11 PROCEDURE — 85730 THROMBOPLASTIN TIME PARTIAL: CPT | Performed by: INTERNAL MEDICINE

## 2023-12-11 PROCEDURE — 85027 COMPLETE CBC AUTOMATED: CPT | Performed by: INTERNAL MEDICINE

## 2023-12-11 PROCEDURE — 250N000011 HC RX IP 250 OP 636: Mod: JZ | Performed by: HOSPITALIST

## 2023-12-11 PROCEDURE — 85045 AUTOMATED RETICULOCYTE COUNT: CPT | Performed by: INTERNAL MEDICINE

## 2023-12-11 PROCEDURE — 99221 1ST HOSP IP/OBS SF/LOW 40: CPT | Mod: 24 | Performed by: SURGERY

## 2023-12-11 PROCEDURE — 250N000009 HC RX 250: Performed by: INTERNAL MEDICINE

## 2023-12-11 PROCEDURE — 82607 VITAMIN B-12: CPT | Performed by: INTERNAL MEDICINE

## 2023-12-11 PROCEDURE — 250N000013 HC RX MED GY IP 250 OP 250 PS 637: Performed by: PHYSICIAN ASSISTANT

## 2023-12-11 PROCEDURE — 83550 IRON BINDING TEST: CPT | Performed by: INTERNAL MEDICINE

## 2023-12-11 PROCEDURE — 250N000011 HC RX IP 250 OP 636: Performed by: INTERNAL MEDICINE

## 2023-12-11 PROCEDURE — 250N000011 HC RX IP 250 OP 636: Mod: JZ | Performed by: INTERNAL MEDICINE

## 2023-12-11 PROCEDURE — 120N000013 HC R&B IMCU

## 2023-12-11 PROCEDURE — 272N000451 HC KIT SHRLOCK 5FR POWER PICC DOUBLE LUMEN

## 2023-12-11 PROCEDURE — 99233 SBSQ HOSP IP/OBS HIGH 50: CPT | Performed by: INTERNAL MEDICINE

## 2023-12-11 RX ORDER — ONDANSETRON 2 MG/ML
4 INJECTION INTRAMUSCULAR; INTRAVENOUS
Status: CANCELLED | OUTPATIENT
Start: 2023-12-11

## 2023-12-11 RX ORDER — HYDRALAZINE HYDROCHLORIDE 20 MG/ML
10 INJECTION INTRAMUSCULAR; INTRAVENOUS EVERY 4 HOURS PRN
Status: DISCONTINUED | OUTPATIENT
Start: 2023-12-11 | End: 2023-12-15 | Stop reason: HOSPADM

## 2023-12-11 RX ORDER — LIDOCAINE 40 MG/G
CREAM TOPICAL
Status: CANCELLED | OUTPATIENT
Start: 2023-12-11

## 2023-12-11 RX ORDER — LIDOCAINE 40 MG/G
CREAM TOPICAL
Status: ACTIVE | OUTPATIENT
Start: 2023-12-11 | End: 2023-12-14

## 2023-12-11 RX ORDER — BISACODYL 5 MG
10 TABLET, DELAYED RELEASE (ENTERIC COATED) ORAL ONCE
Status: COMPLETED | OUTPATIENT
Start: 2023-12-11 | End: 2023-12-11

## 2023-12-11 RX ORDER — POLYETHYLENE GLYCOL 3350 17 G/17G
238 POWDER, FOR SOLUTION ORAL ONCE
Status: COMPLETED | OUTPATIENT
Start: 2023-12-11 | End: 2023-12-11

## 2023-12-11 RX ORDER — MAGNESIUM CARB/ALUMINUM HYDROX 105-160MG
296 TABLET,CHEWABLE ORAL ONCE
Status: COMPLETED | OUTPATIENT
Start: 2023-12-12 | End: 2023-12-12

## 2023-12-11 RX ADMIN — POLYETHYLENE GLYCOL 3350 238 G: 17 POWDER, FOR SOLUTION ORAL at 18:49

## 2023-12-11 RX ADMIN — OMEPRAZOLE 40 MG: 20 CAPSULE, DELAYED RELEASE ORAL at 18:49

## 2023-12-11 RX ADMIN — GABAPENTIN 100 MG: 100 CAPSULE ORAL at 21:44

## 2023-12-11 RX ADMIN — OXYCODONE HYDROCHLORIDE 5 MG: 5 TABLET ORAL at 04:35

## 2023-12-11 RX ADMIN — DOCUSATE SODIUM 50 MG AND SENNOSIDES 8.6 MG 1 TABLET: 8.6; 5 TABLET, FILM COATED ORAL at 09:23

## 2023-12-11 RX ADMIN — LORAZEPAM 0.5 MG: 0.5 TABLET ORAL at 09:22

## 2023-12-11 RX ADMIN — LIDOCAINE HYDROCHLORIDE ANHYDROUS 1 ML: 10 INJECTION, SOLUTION INFILTRATION at 14:43

## 2023-12-11 RX ADMIN — FERROUS SULFATE TAB 325 MG (65 MG ELEMENTAL FE) 325 MG: 325 (65 FE) TAB at 09:22

## 2023-12-11 RX ADMIN — GABAPENTIN 100 MG: 100 CAPSULE ORAL at 18:49

## 2023-12-11 RX ADMIN — HYDRALAZINE HYDROCHLORIDE 10 MG: 20 INJECTION INTRAMUSCULAR; INTRAVENOUS at 20:33

## 2023-12-11 RX ADMIN — ACETAMINOPHEN 1000 MG: 500 TABLET, FILM COATED ORAL at 21:44

## 2023-12-11 RX ADMIN — NICOTINE 1 PATCH: 14 PATCH, EXTENDED RELEASE TRANSDERMAL at 09:29

## 2023-12-11 RX ADMIN — ROSUVASTATIN CALCIUM 40 MG: 20 TABLET, FILM COATED ORAL at 21:44

## 2023-12-11 RX ADMIN — ACETAMINOPHEN 1000 MG: 500 TABLET, FILM COATED ORAL at 12:17

## 2023-12-11 RX ADMIN — ONDANSETRON 4 MG: 2 INJECTION INTRAMUSCULAR; INTRAVENOUS at 20:49

## 2023-12-11 RX ADMIN — OXYCODONE HYDROCHLORIDE 5 MG: 5 TABLET ORAL at 15:03

## 2023-12-11 RX ADMIN — CARVEDILOL 6.25 MG: 6.25 TABLET, FILM COATED ORAL at 18:49

## 2023-12-11 RX ADMIN — FLUTICASONE FUROATE AND VILANTEROL TRIFENATATE 1 PUFF: 200; 25 POWDER RESPIRATORY (INHALATION) at 09:28

## 2023-12-11 RX ADMIN — TRIAMCINOLONE ACETONIDE: 1 OINTMENT TOPICAL at 15:07

## 2023-12-11 RX ADMIN — BISACODYL 10 MG: 5 TABLET, COATED ORAL at 09:23

## 2023-12-11 RX ADMIN — AMLODIPINE BESYLATE 2.5 MG: 2.5 TABLET ORAL at 09:22

## 2023-12-11 RX ADMIN — GABAPENTIN 100 MG: 100 CAPSULE ORAL at 09:22

## 2023-12-11 RX ADMIN — EMPAGLIFLOZIN 10 MG: 10 TABLET, FILM COATED ORAL at 12:06

## 2023-12-11 RX ADMIN — OMEPRAZOLE 40 MG: 20 CAPSULE, DELAYED RELEASE ORAL at 09:23

## 2023-12-11 RX ADMIN — FAMOTIDINE 40 MG: 10 INJECTION INTRAVENOUS at 09:22

## 2023-12-11 RX ADMIN — ACETAMINOPHEN 1000 MG: 500 TABLET, FILM COATED ORAL at 04:35

## 2023-12-11 ASSESSMENT — ACTIVITIES OF DAILY LIVING (ADL)
ADLS_ACUITY_SCORE: 29
ADLS_ACUITY_SCORE: 25
DEPENDENT_IADLS:: INDEPENDENT
ADLS_ACUITY_SCORE: 29
ADLS_ACUITY_SCORE: 25
ADLS_ACUITY_SCORE: 44
ADLS_ACUITY_SCORE: 29
ADLS_ACUITY_SCORE: 29
ADLS_ACUITY_SCORE: 35
ADLS_ACUITY_SCORE: 25
ADLS_ACUITY_SCORE: 37
ADLS_ACUITY_SCORE: 29
ADLS_ACUITY_SCORE: 29

## 2023-12-11 NOTE — PROGRESS NOTES
"CLINICAL NUTRITION SERVICES  -  ASSESSMENT NOTE    Recommendations Ordered by Registered Dietitian (RD):   Ordered daily Gelatein Plus BID (either flavor) @ 10 am and 2 pm and will trial a Boost Clear drink  Encouraged po intake, emphasizing the importance of protein to preserve lean muscle mass   Malnutrition: Patient does not meet two of the above criteria necessary for diagnosing malnutrition     REASON FOR ASSESSMENT  Shirley Hendricks is a 63 year old female seen by Registered Dietitian for Admission Nutrition Risk Screen for positive Malnutrition Score: 3 (12/10/23 2200) of wt loss -14-23 lbs and poor appetite.    Admitted: on 12/10/2023.pt was recently hospitalized from 10/3-10/14/23 for chest pain and had acute occlusion of her RLE PTFE Graft S/p embolectony and thromectomy by vascular surgery.      PMH of: COPD, GERD, hyperlipidemia, hypertension, CAD, peripheral vascular disease, Lupus, history of Takotsubo cardiomyopathy with EF of 30 to 35%, status postcoronary angiogram on 10/4/2023, depression with anxiety, recent diagnosis of Malignant B-cell lymphoma of R-neck in October 2023, patient had acute thrombosis of the right femoral to anterior tibial PTFE bypass graft with severe lumbar ischemia during recent hospitalization and underwent emergent right femoral to anterior tibial PTFE graft thrombectomy, anterior tibial thrombectomy intraoperative angiography    NUTRITION HISTORY  - Information obtained from chart review and pt at bedside  - The pt endorsed the wt loss and poor appetite, explaining this has been going on since at least July but that her po intake hasn't actually decreased during this time  - UBW: 125-132 lbs. However, after more discussion it was discovered that this wt is from when pt retired in 2021 and pt must have had her wt \"screwed up\"  - Chewing/swallowing issues, n/v, or other Barriers to PO intakes: Yes, pt report pain while swallowing and explained this has been going on for a " "couple years. She hasn't told her providers about this but encouraged to do so.   - Patient on a regular diet at home.  - Typical food/fluid intake is 3 meals/day (sometimes skips lunch) consisting of -   Breakfast: 2 eggs and hash browns with milk and coffee; pt is a \"coffee-holic\"  Lunch: Ham or chicken sandwich with chips and a Dr Pepper.     Dinner: Tacos or Baked chicken with a baked potato and veggie salad  - Use of oral supplements: None PTA. Pt was agreeable to receiving Gelatein Plus BID (either flavor) @ 10 am and 2 pm and will trial a Boost Clear drink    CURRENT NUTRITION ORDERS  Diet Order:   NPO and Clear Liquid     Current Intake/Tolerance: NPO and no other po intake recorded    GI: No BM recorded so far this admission    NUTRITION FOCUSED PHYSICAL ASSESSMENT FOR DIAGNOSING MALNUTRITION)  Yes          Observed:    Muscle wasting (refer to documentation in Malnutrition section)    ANTHROPOMETRICS  Height: 5' 2\"  Weight: 112 lbs 0 oz (50.8 kg)  Body mass index is 20.65 kg/m .  Weight Status:  Normal BMI  IBW: 110 lb (50 kg)  % IBW: 102%  Weight History: -1.5 kg (3%) in 4 months   Wt Readings from Last 10 Encounters:   12/10/23 50.8 kg (112 lb)   11/21/23 50.6 kg (111 lb 8 oz)   11/13/23 51.6 kg (113 lb 11.2 oz)   10/11/23 52.5 kg (115 lb 11.2 oz)   08/02/23 52.3 kg (115 lb 6.4 oz)   05/26/23 52.1 kg (114 lb 12.8 oz)   12/28/22 51.1 kg (112 lb 11.2 oz)   12/27/22 51.7 kg (114 lb)   12/09/22 51.7 kg (113 lb 14.4 oz)   02/15/22 46.3 kg (102 lb)      MEDICATIONS  Ferosul, Mag citrate, Heparin drip    LABS  Electrolytes  Sodium (mmol/L)   Date Value   12/11/2023 133 (L)   07/09/2021 132 (L)     Potassium (mmol/L)   Date Value   12/11/2023 4.4   12/29/2022 4.3   07/09/2021 5.2     Magnesium (mg/dL)   Date Value   10/14/2023 1.9   07/09/2021 1.7     Phosphorus (mg/dL)   Date Value   07/09/2021 4.0    Blood Glucose  Glucose (mg/dL)   Date Value   12/11/2023 86   12/29/2022 93   07/09/2021 98     GLUCOSE BY " METER POCT (mg/dL)   Date Value   12/10/2023 119 (H)     Hemoglobin A1C (%)   Date Value   02/15/2022 5.3   09/23/2020 5.4    Renal  Urea Nitrogen (mg/dL)   Date Value   12/11/2023 24.9 (H)   12/29/2022 17   07/09/2021 13     Creatinine (mg/dL)   Date Value   12/11/2023 0.87   07/09/2021 0.77         ASSESSED NUTRITION NEEDS PER APPROVED PRACTICE GUIDELINES:  Dosing Weight 50.8 kg (actual)  Estimated Energy Needs: 5664-0021 kcals (25-30 Kcal/Kg)  Justification: maintenance  Estimated Protein Needs: 51-61 grams protein (1-1.2 g pro/Kg)  Justification: maintenance  Estimated Fluid Needs: 9761-1924  mL (25-30 mL/kg)  Justification: maintenance    MALNUTRITION:  % Weight Loss:  Weight loss does not meet criteria for malnutrition  % Intake:  Decreased intake does not meet criteria for malnutrition  Subcutaneous Fat Loss:  None observed  Muscle Loss:  Clavicle bone region mild depletion and Dorsal hand region mild depletion  Fluid Retention:  None noted    Malnutrition Diagnosis: Patient does not meet two of the above criteria necessary for diagnosing malnutrition    NUTRITION DIAGNOSIS:  Predicted suboptimal nutrient intake related to pt report poor appetite and pain with swallowing    NUTRITION INTERVENTIONS  Recommendations / Nutrition Prescription  Ordered daily Gelatein Plus BID (either flavor) @ 10 am and 2 pm and will trial a Boost Clear drink  Encouraged po intake, emphasizing the importance of protein to preserve lean muscle mass    Implementation  General/healthful diet    Nutrition Goals  Patient to consume % of nutritionally adequate meal trays TID, or the equivalent with supplements/snacks.     MONITORING AND EVALUATION:  Progress towards goals will be monitored and evaluated per protocol and Practice Guidelines    Vasquez Wang RD, LD
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
## 2023-12-11 NOTE — PROCEDURES
[facility]    Double Lumen PICC Placement    Date/Time: 12/11/2023 2:55 PM    Performed by: Devyn Meza RN  Authorized by: Vadim Ramirez MD  Indications: vascular access      UNIVERSAL PROTOCOL   Site Marked: Yes  Prior Images Obtained and Reviewed:  Yes  Required items: Required blood products, implants, devices and special equipment available    Patient identity confirmed:  Verbally with patient, arm band and hospital-assigned identification number  NA - No sedation, light sedation, or local anesthesia  Confirmation Checklist:  Patient's identity using two indicators, relevant allergies, procedure was appropriate and matched the consent or emergent situation and correct equipment/implants were available  Time out: Immediately prior to the procedure a time out was called    Universal Protocol: the Joint Commission Universal Protocol was followed    Preparation: Patient was prepped and draped in usual sterile fashion       ANESTHESIA    Local Anesthetic:  Lidocaine 1% without epinephrine  Anesthetic Total (mL):  1      SEDATION    Patient Sedated: No        Skin prep agent: skin prep agent completely dried prior to procedure  Sterile barriers: maximum sterile barriers were used: cap, mask, sterile gown, sterile gloves, and large sterile sheet  Hand hygiene: hand hygiene performed prior to central venous catheter insertion  Type of line used: PICC  Catheter type: double lumen  Lumen type: valved and power PICC  Lumen Identification: Purple and Red  Catheter size: 5 Fr  Brand: Bard  Lot number: [id number]  Placement method: MST, venipuncture and ultrasound  Number of attempts: 1  Difficulty threading catheter: no  Successful placement: yes  Orientation: right    Location: brachial vein (medial)  Tip Location: SVC  Arm circumference: adults 10 cm  Extremity circumference: 25  Visible catheter length: 2  Total catheter length: 38  Internal Lumen Volume: 36 mL    Dressing and  securement: chlorhexidine patch applied, site cleansed, subcutaneous anchor securement system, transparent securement dressing, transparent dressing, securement device and sterile dressing applied  Post procedure assessment: blood return through all ports  PROCEDURE   Patient Tolerance:  Patient tolerated the procedure well with no immediate complicationsDescribe Procedure: Nursing note  Successfully placed double lumen PICC on the right brachial vein on one attempt with good blood return noted.  Disposal: sharps and needle count correct at the end of procedure, needles and guidewire disposed in sharps container

## 2023-12-11 NOTE — TELEPHONE ENCOUNTER
Paged re: this patient around 3 pm and called back.    She was complaining of worsening claudication and what sounds like rest pain.    No numbness or weakness in the leg.    Given her previous bypass that went down, advised her to come to the ED for evaluation of bypass graft to make sure it is not thrombosed. She voiced understanding and agreed to the plan.

## 2023-12-11 NOTE — PLAN OF CARE
Patient came up to floor from ED, [place] status. Lethargic but oriented x4. Vital signs stable ex. Bradycardic @ 50's on RA. Assist of 1, GB to BSC. NPO from midnight. Lungs sounds LLL crackles, otherwise diminished. Bowel sounds active. Passing flatus, BM -. Voiding adequately. RLE scab, otherwise intact. Pain managed with scheduled Tylenol. Denies nausea. CMS intact ex. R hand and BLE numbness and tingling. Pulses on R radial and BLE present with doppler. L radial pulse palpable. Tele Sinus Nomi. Hep gtt infusing at 750u/hr. PTT recheck at 0913. Hgb 6.6- 1 unit transfused, next hgb check at 0600.

## 2023-12-11 NOTE — H&P
[facility]    History and Physical - Hospitalist Service       Date of Admission:  12/10/2023    Assessment & Plan      Shirley Hendricks is a 63 year old female admitted on 12/10/2023.pt was recently hospitalized from 10/3-10/14/23 for chest pain and had acute occlusion of her RLE PTFE Graft S/p embolectony and thromectomy by vascular surgery.  She has PMH significant for COPD, GERD, hyperlipidemia, hypertension, CAD, peripheral vascular disease, Lupus, history of Takotsubo cardiomyopathy with EF of 30 to 35%, status postcoronary angiogram on 10/4/2023 with no new lesions seen on coronary angiogram , recommended GDMT given cardiomyopathy,, depression with anxiety, new recent diagnosis of Malignant B-cell lymphoma of R-neck in October 2023, patient had acute thrombosis of the right femoral to anterior tibial PTFE bypass graft with severe lumbar ischemia during recent hospitalization and underwent emergent right femoral to anterior tibial PTFE graft thrombectomy, anterior tibial thrombectomy intraoperative angiography by vascular surgery.  Patient received 2 units of PRBCs intraoperatively.  Hemoglobin was 7.2 prior to procedure and during that hospitalization hemoglobins stable around 9 g/dL.  She presented to the emergency room on 12/10/2023 with complaints of worsening right lower extremity pain and dyspnea on exertion and was found to be anemic with hemoglobin of 6. Her occult stool blood testing returned positive.  Twelve-lead EKG showed normal sinus rhythm, low voltage QRS complexes with nonspecific ST abnormality, serial  tropes remain stable at 21, patient denied any chest pain but was complaining of shortness of breath with exertion thought to be secondary to anemia,  bilateral lower extremity ultrasound showed bilateral bypass grafts are patent without any stenosis.    Acute on chronic possible blood loss anemia while on anticoagulation with Plavix and Xarelto;  History of  GERD  Admit her to the hospital under IMC status  1 unit PRBCs being given in the ED  Hemoglobin rechecks every 8 hours  N.p.o. after midnight ordered  Conditional order to transfuse if hemoglobin less than 7 entered  Patient is on Prilosec daily PT admission will increase to twice daily  Hold Xarelto and Plavix  Patient was started on low-dose intensity heparin drip due to her extensive history of peripheral arterial disease with recent acute thrombosis of the graft needing embolectomy and thrombectomy  Patient also has a history of  Malignanat   B-cell lymphoma with recent diagnosis will request [place] oncology consultation to further evaluate anemia as well  Poor IV access during last hospitalization.  Patient needed a PICC placement during last hospitalization due to her veins are hard to being found.  Vascular access consultation requested for possible midline placement to ease the blood draws  emergent right femoral to anterior tibial PTFE graft thrombectomy, anterior tibial thrombectomy intraoperative angiography by vascular surgery on 10/11/2023 with complaints of leg pain;  History of peripheral artery disease status post bilateral lower extremity bypass grafting  Bilateral carotid endarterectomy in the past  Ultrasound showed bilateral lower extremity bypass grafts are patent without any stenosis  Vascular surgery consultation will be requested for further evaluation and management  Will schedule Tylenol for pain control, IV Dilaudid and oxycodone for now    History of Takotsubo cardiomyopathy with EF of 30 to 35%    Patient had recent coronary angiogram on 10/4/2023 which showed no new lesions  Continue PTA meds including Coreg, Norvasc, statin  Hold lisinopril for now with anemia  Hold Jardiance as patient is going to be n.p.o. after midnight    COPD  PTA regimen includes Leelee-Ellipta. Well controlled.   -Continue PTA inhaler  -Respiratory status stable currently     Tobacco dependence  [facility]  use  Major depressive disorder  Anxiety  Nicotine patch ordered  As needed Ativan ordered  Lupus  Noted in history.   Patient does not follow regularly with rheumatology      Diet:  Regular, n.p.o. after midnight  DVT Prophylaxis: Heparin drip   Alvarez Catheter: Not present  Lines: None     Cardiac Monitoring: None  Code Status:  Full code discussed with the patient on admission    Clinically Significant Risk Factors Present on Admission               # Drug Induced Coagulation Defect: home medication list includes an anticoagulant medication  # Drug Induced Platelet Defect: home medication list includes an antiplatelet medication   # Hypertension: Noted on problem list                 Disposition Plan      Expected Discharge Date: 12/12/2023                  Eli Peraza MD  Hospitalist Service  [facility]  Securely message with [employer] (more info)  Text page via [employer] Paging/Directory     ______________________________________________________________________    Chief Complaint   Right leg pain with recent PTFE graft from the right common femoral to mid anterior tibial artery on Xarelto, worsening anemia with hemoglobin of 6    History is obtained from the patient, electronic health record, and emergency department physician    History of Present Illness   Shirley Hendricks is a 63 year old female with a PMH significant for COPD, GERD, hyperlipidemia, hypertension, CAD, peripheral vascular disease, Lupus, history of Takotsubo cardiomyopathy with EF of 30 to 35%, status postcoronary angiogram on 10/4/2023 with no new lesions seen on coronary angiogram , recommended GDMT given cardiomyopathy,, depression with anxiety, new recent diagnosis of Malignant B-cell lymphoma of R-neck in October 2023, patient had acute thrombosis of the right femoral to anterior tibial PTFE bypass graft with severe lumbar ischemia during recent hospitalization and underwent emergent right femoral to anterior  tibial PTFE graft thrombectomy, anterior tibial thrombectomy intraoperative angiography by vascular surgery.  Patient received 2 units of PRBCs intraoperatively on 711.  Hemoglobin was 7.2 prior to procedure and during that hospitalization hemoglobins stable around 9 g/dL.  She was discharged on iron supplement every other day presented to the emergency room on 12/10/2023 with complaints of worsening right lower extremity pain.  Patient was placed on Plavix and Xarelto for anticoagulation.  She called vascular surgery team with worsening light right lower extremity pain with walking they are concerned about possible graft occlusion so she was referred to the emergency room.  In the emergency room she was evaluated by Dr. Radha Al  Bilateral lower extremity arterial duplex ultrasound showed patent bilateral vascular arterial grafts without evidence of critical stenosis or occlusion.  Chest x-ray showed no evidence of acute cardiopulmonary disease, cholecystectomy  Her vital signs are stable temperature 98.1  F afebrile pulse rate is 64-70, blood pressure 127/37 respirate of 13-15 she was satting 100% on room air.    Patient also complained of dyspnea on exertion in the ED denies any chest pain or chest pressure.  Her lab work showed her hemoglobin to be low at 6.0.  WBC count normal 5.4 platelet count normal at 259 K  Stool occult blood testing returned positive.  She did not notice much bleeding but saw some pink in her stool yesterday as per the patient.  She denies any fevers or chills, abdominal pain, nausea or vomiting.  Patient is very anxious in the emergency room and told me that she is a very hard poke and she wants a PICC line placed if you are going to do frequent blood draws.  She has allergy to Protonix so she was given a dose of IV Pepcid.  1 unit PRBCs ordered .blood transfusion consent signed by the patient and ED physician and placed in the chart.  Then she started throwing her legs in bed saying  that she feels restless she needs to walk around.  Patient has not taken Xarelto tonight but she has been taking her all her medications regularly.  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 clot    Post-menopausal     Reflux esophagitis 02/2004    EGD: esophagitis and medium HH    SBO (small bowel obstruction) (H) 08/10/2021    SVT (supraventricular tachycardia)     Thrombocytopenia (H24)     Uncomplicated asthma     Vitamin C deficiency 08/12/2018       Past Surgical History   Past Surgical History:   Procedure Laterality Date    ANGIOGRAM      ANGIOGRAM Right 6/23/2021    Procedure: RIGHT LOWER EXTREMITY ANGIOGRAM WITH LEFT BRACHIAL ARTERY CUTDOWN;  Surgeon: José Luis Hernandez MD;  Location: [facility] OR    BIOPSY MASS NECK Right 10/11/2023    Procedure: Right Parotid Biopsy;  Surgeon: Randal Mendoza MD;  Location:  OR    BYPASS GRAFT FEMOROPOPLITEAL Right 09/23/2020    Procedure: RIGHT FEMORAL GRAFT TO ABOVE-KNEE  POPLITEAL BYPASS USING CADAVERIC SUPERFICIAL FEMORAL ARTERY;  Surgeon: Chadwick Lozano MD;  Location:  OR    BYPASS GRAFT FEMOROPOPLITEAL Right 2/15/2022    Procedure: RIGHT SUPERFICIAL FEMORAL ARTERY GRAFT TO BELOW KNEE POPLITEAL BYPASS WITH CADAVERIC CRYOLIFE  FEMORAL-POPLITEAL ARTERY SIZE: OUTER DIAMETER: 7MM - 6MM;  Surgeon: Chadwick Lozano;  Location: [facility] OR    BYPASS GRAFT FEMOROPOPLITEAL Right 5/26/2023    Procedure: RIGHT DISTAL SUPERFICIAL FEMORAL GRAFT TO ANTERIOR TIBIAL ARTERY WITH 26 CENTIMETER CADAVERIC SUPERFICIAL FEMORAL ARTERY GRAFT;  Surgeon: Chadwick Lozano MD;  Location: [facility] OR    BYPASS GRAFT FEMOROPOPLITEAL Right 10/11/2023    Procedure: RIGHT FEMORAL TO POPLITEAL GRAFT THROMBECTOMY;  Surgeon: Chadwick Lozano MD;  Location:  OR    BYPASS GRAFT INSITU FEMOROPOPLITEAL Right 7/7/2021    Procedure: CREATION RIGHT FEMORAL TO POPLITEAL ARTERIAL BYPASS USING INSITU VEIN GRAFT;  Surgeon: José Luis Hernandez MD;  Location:  OR    CARDIAC CATHERIZATION  09/03/2009    multivessel CAD without target lesions, med mgmt indicated, preserved ef    CARPAL TUNNEL RELEASE RT/LT Right 05/20/2021    CV CORONARY ANGIOGRAM N/A 10/4/2023    Procedure: Coronary Angiogram;  Surgeon: Rogelio Ricks MD;  Location:  HEART CARDIAC CATH LAB    CV PCI N/A 10/4/2023    Procedure: Percutaneous Coronary Intervention;  Surgeon: Rogelio Ricks MD;  Location:  HEART CARDIAC CATH LAB    EMBOLECTOMY LOWER EXTREMITY Right 10/6/2023    Procedure: Right anterior tibial bypass with graft, Right tibial endarterectomy,thrombectomy, Right doraslis pedis thrombectomy, Anterior Tibial vein patch.;  Surgeon: Chadwick Lozano MD;  Location:  OR    ENDARTERECTOMY CAROTID Right 05/11/2016    Procedure: ENDARTERECTOMY CAROTID;  Surgeon: Chadwick Lozano MD;  Location:  OR    ENDARTERECTOMY CAROTID Left 06/08/2020    Procedure: LEFT CAROTID ENDARTERECTOMY with distal facal  vein patch  and EEG;  Surgeon: Chadwick Lozano MD;  Location:  OR    FINE NEEDLE ASPIRATION WITHOUT IMAGING GUIDANCE Right 9/22/2023    Procedure: Fine needle aspiration without imaging guidance;  Surgeon: Kiersten Aguilera MD;  Location: [facility] GI    FLUORESCENCE INTRAOPERATIVE VASCULAR ANGIOGRAPHY Right 12/28/2022    Procedure: RIGHT LEG ANGIOGRAM with intervention;  Surgeon: Chadwick Lozano MD;  Location:  OR    GYN SURGERY  left tube    left salpingectomy    IR ANGIOGRAM THROUGH CATHETER (ARTERIAL)  10/6/2023    IR ANGIOGRAM THROUGH CATHETER (ARTERIAL)  10/6/2023    IR LOWER EXTREMITY ANGIOGRAM RIGHT  05/25/2021    IR LOWER EXTREMITY ANGIOGRAM RIGHT  10/5/2023    IR OR ANGIOGRAM  6/23/2021    IR OR ANGIOGRAM  12/28/2022    LAPAROSCOPIC CHOLECYSTECTOMY N/A 09/27/2017    Procedure: LAPAROSCOPIC CHOLECYSTECTOMY;  LAPAROSCOPIC CHOLECYSTECTOMY;  Surgeon: Jacoby Tapia MD;  Location: [facility]    LAPAROSCOPY DIAGNOSTIC (GENERAL) N/A 8/11/2021    Procedure: Exploratory laparoscopy,  laparoscopic lysis of adhesions, laparotomy;  Surgeon: Corina Ferreira MD;  Location:  OR    OR ANGIOGRAM, LOWER EXTREMITY Right 10/11/2023    Procedure: Or Angiogram, Lower Extremity;  Surgeon: Chadwick Lozano MD;  Location:  OR    ORTHOPEDIC SURGERY      left knee surgery    REPAIR ANEURYSM FEMORAL ARTERY Left 12/28/2022    Procedure: REPAIR LEFT FEMORAL PSEUDOANEURYSM;  Surgeon: Chadwick Lozano MD;  Location:  OR    VASCULAR SURGERY  aoto bi fem  left fem-AK pop    C FABRIC WRAPPING OF ABDOMINAL ANEURYSM      [facility] NONSPECIFIC PROCEDURE  12/2000    angioplasty with stent right fem. a.    [facility] NONSPECIFIC PROCEDURE  1987    sinus surgery    [facility] NONSPECIFIC PROCEDURE  09/02/2009    Emergent left groin exploration with oversewing of bleeding angiographic site. 2. Endarterectomy of common femoral-proximal superficial femoral artery with greater saphenous vein patch angioplasty.   a. [place] of  accessory right greater saphenous vein.     [facility] NONSPECIFIC PROCEDURE  08/27/2009    occluded left common iliac and external iliac arteries were successfully revascularized with stenting to 8 and 7 mm        Prior to Admission Medications   Prior to Admission Medications   Prescriptions Last Dose Informant Patient Reported? Taking?   BREO ELLIPTA 200-25 MCG/ACT inhaler 12/10/2023 at am Self No Yes   Sig: Inhale 1 puff into the lungs daily   Calcium Carb-Cholecalciferol (CALCIUM CARBONATE-VITAMIN D3) 600-400 MG-UNIT TABS 12/10/2023 at am Self No Yes   Sig: TAKE ONE TABLET BY MOUTH TWICE DAILY   acetaminophen (TYLENOL) 500 MG tablet Unknown Self Yes Yes   Sig: Take 500-1,000 mg by mouth every 6 hours as needed for mild pain   amLODIPine (NORVASC) 2.5 MG tablet 12/10/2023?  No Yes   Sig: Take 1 tablet (2.5 mg) by mouth daily   augmented betamethasone dipropionate (DIPROLENE AF) 0.05 % external cream Unknown  No Yes   Sig: Apply topically 2 times daily as needed (skin rash)   carvedilol (COREG) 6.25 MG tablet 12/10/2023 at am  No Yes   Sig: Take 1 tablet (6.25 mg) by mouth 2 times daily (with meals)   clopidogrel (PLAVIX) 75 MG tablet 12/10/2023?  No Yes   Sig: Take 1 tablet (75 mg) by mouth daily   diphenhydrAMINE (BENADRYL) 25 MG tablet Unknown Self No Yes   Sig: take Benadryl 25-50 mg one hour prior to the procedure   empagliflozin (JARDIANCE) 10 MG TABS tablet 12/10/2023?  No Yes   Sig: Take 1 tablet (10 mg) by mouth daily   ferrous sulfate (FEROSUL) 325 (65 Fe) MG tablet 12/10/2023  No Yes   Sig: Take 1 tablet (325 mg) by mouth every other day   gabapentin (NEURONTIN) 100 MG capsule 12/10/2023 at am  No Yes   Sig: Take 1 capsule (100 mg) by mouth 3 times daily   lisinopril (ZESTRIL) 10 MG tablet 12/10/2023?  No Yes   Sig: Take 1 tablet (10 mg) by mouth daily   nicotine (NICODERM CQ) 14 MG/24HR 24 hr patch   No No   Sig: Place 1 patch onto the skin daily   Patient not taking: Reported on 11/21/2023   nitroGLYcerin  (NITROSTAT) 0.4 MG sublingual tablet Unknown Self No Yes   Sig: Place 1 tablet (0.4 mg) under the tongue See Admin Instructions for chest pain   omeprazole (PRILOSEC) 20 MG DR capsule 12/10/2023 at am Self No Yes   Sig: TAKE ONE CAPSULE BY MOUTH DAILY   polyethylene glycol (MIRALAX) 17 GM/Dose powder Unknown  No Yes   Sig: Take 17 g by mouth daily Hold for diarrhea or loose stools   Patient taking differently: Take 17 g by mouth daily as needed for constipation Hold for diarrhea or loose stools   rivaroxaban ANTICOAGULANT (XARELTO) 20 MG TABS tablet 12/9/2023 at pm  No Yes   Sig: Take 1 tablet (20 mg) by mouth daily (with dinner)   rosuvastatin (CRESTOR) 40 MG tablet 12/9/2023  No Yes   Sig: Take 1 tablet (40 mg) by mouth At Bedtime   senna-docusate (SENOKOT-S/PERICOLACE) 8.6-50 MG tablet Unknown at am  No Yes   Sig: Take 1 tablet by mouth daily as needed for constipation   senna-docusate (SENOKOT-S/PERICOLACE) 8.6-50 MG tablet 12/10/2023 at am  Yes Yes   Sig: Take 1 tablet by mouth 2 times daily   triamcinolone (KENALOG) 0.1 % external ointment Unknown Self Yes Yes   Sig: Apply topically 2 times daily as needed for irritation Apply to back      Facility-Administered Medications: None        Review of Systems    The 10 point Review of Systems is negative other than noted in the HPI     Social History   I have reviewed this patient's social history and updated it with pertinent information if needed.  Social History     Tobacco Use    Smoking status: Some Days     Packs/day: 0.50     Years: 52.00     Additional pack years: 0.00     Total pack years: 26.00     Types: Cigarettes     Start date: 1968    Smokeless tobacco: Never    Tobacco comments:     1/2 PPD   Vaping Use    Vaping Use: Never used   Substance Use Topics    Alcohol use: Not Currently     Comment: x1-2 yr    Drug use: Yes     Types: Marijuana     Comment: 2x per day         Family History   I have reviewed this patient's family history and updated it  with pertinent information if needed.  Family History   Problem Relation Age of Onset    Cancer Mother         bladder    Cardiovascular Father         alive,multiple heart attacks    Diabetes Maternal Grandmother     Lung Cancer Maternal Grandmother     Blood Disease Brother         clotting disorder         Allergies   Allergies   Allergen Reactions    Contrast Dye Anaphylaxis     RASH, FACIAL AND NECK SWELLING, SOB, WHEEZING    Pantoprazole      Protonix caused diffuse edema    Chantix [Varenicline]      Terrible dreams    Penicillins Itching and Rash        Physical Exam   Vital Signs: Temp: 97.7  F (36.5  C) Temp src: Temporal BP: (!) 140/69 Pulse: 74   Resp: 13 SpO2: 100 %      Weight: 112 lbs 0 oz    General Appearance: Alert oriented, mildly anxious, not in acute distress  Eyes: No scleral icterus or conjunctival injection  HEENT: Head is atraumatic normocephalic  Respiratory: Clear to auscultation bilaterally  Cardiovascular: Normal rate rhythm regular  GI: Soft, nontender, nondistended, bowel sounds positive  Skin: Recent surgical incision area is healing well without any acute signs of infection  Musculoskeletal: No clubbing cyanosis or edema  Neurologic: No focal weakness  Psychiatric: Mood and affect are normal    Medical Decision Making       90 MINUTES SPENT BY ME on the date of service doing chart review, history, exam, documentation & further activities per the note.      Data     I have personally reviewed the following data over the past 24 hrs:    5.4  \   6.0 (LL)   / 259     131 (L) 100 23.4 (H) /  94   4.5 20 (L) 0.79 \     Trop: 21 (H) BNP: N/A       Imaging results reviewed over the past 24 hrs:   Recent Results (from the past 24 hour(s))   XR Chest 2 Views    Narrative    EXAM: XR CHEST 2 VIEWS  LOCATION: [facility] [place]  DATE: 12/10/2023    INDICATION: dyspnea  COMPARISON: Chest radiograph 12/09/2022, CT 10/30/2023      Impression    IMPRESSION: No evidence of acute  cardiopulmonary disease. Cholecystectomy.   US Lower Extremity Arterial Duplex Bilateral    Narrative    EXAM: US LOWER EXTREMITY ARTERIAL DUPLEX BILATERAL  LOCATION: [facility]  DATE: 12/10/2023    INDICATION: R>L pain, chronic severe PAD  COMPARISON: Ultrasound 10/04/2023  TECHNIQUE: Duplex utilizing 2D gray-scale imaging, Doppler interrogation with color-flow and spectral waveform analysis.    FINDINGS:     RIGHT - Status post qftljha-ev-elsacifjs artery bypass graft with subsequent distal jump graft to the tibioperoneal trunk. The distinct grafts are not imaged separately but appear grossly patent. Waveforms are diffusely monophasic with some occasional   blunting. Anterior tibial and dorsalis pedis arteries are patent on today's exam, new in comparison to ultrasound on 10/04/2023.    LEFT - Status post ylriezi-ch-fjeapplaw artery bypass graft appears which patent with increased velocities at both the proximal and distal anastomoses. The arteries at and below the knee are patent, although waveforms are largely monophasic. Dorsalis   pedis artery is noted to be biphasic.    RIGHT LOWER EXTREMITY ARTERIAL ASSESSMENT:  Common femoral artery: 63/14 cm/s  Proximal graft: 82/16 cm/s  Mid graft: 43/5 cm/s  Distal graft: 36/9 cm/s  Anterior tibial artery: 139/8 cm/s  Posterior tibial artery: Occluded.  Dorsalis pedis artery: 73/8 cm/s  Peroneal artery: 21 cm/s, although flow appears to be reversed    LEFT LOWER EXTREMITY ARTERIAL ASSESSMENT:  Common femoral artery: 46/7 cm/s  Proximal graft: 171/17 cm/s  Mid graft: 111/7 cm/s  Distal graft: 209/6 cm/s  SFA (distal): 143/0 cm/s  Popliteal artery (proximal): 165/0 cm/s  Popliteal artery (distal): 138/11 cm/s  Anterior tibial artery: 76/0 cm/s  Posterior tibial artery: 135/0 cm/s  Dorsalis pedis artery: 42/0 cm/s  Peroneal artery:  48/9 cm/s      Impression    IMPRESSION:  1.  Patent bilateral vascular arterial grafts as above without evidence of  critical stenosis or occlusion.

## 2023-12-11 NOTE — CONSULTS
[facility]  Gastroenterology Consultation    Shirley Hendricks  [id number]  [facility] [id number]  63 year old female    Admission Date/Time: 12/10/2023  Primary Care Provider: Vasquez Benoit    We were asked to see the patient in consultation by Dr. Peraza for evaluation of severe anemia with history of multiple polyps on prior colonoscopy.        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  Patient required transfusion of 2 units PRBC after that surgery.  She was discharged on iron supplement.    She presents now with worsening right lower extremity pain.  Patient is on Plavix and Xarelto.  Ultrasound shows patent bilateral grafts without evidence of stenosis or occlusion.  Chest x-ray negative.    Patient also complained of dyspnea on exertion.  BUN 24.9, Cr 0.87. Hemoglobin 8.0 after 2 units PRBC transfusion (6.6 on admission).  MCV 86.  FOB testing positive.  Patient denies sergei bleeding or melena but did not a red tinge to the toilet water a few days ago.  She has had some recent constipation but denies abdominal pain, diarrhea, heartburn, nausea or vomiting.    Colonoscopy 3/2019 with multiple polyps removed, 8 in total.  These were both adenomas and hyperplastic polyps.  EGD in 1/2004 showed esophagitis and a medium sized hiatal hernia.  She reports EGD at outside health system (I am unable to find report) in 10/2023 which was negative.    ROS: A comprehensive ten point review of systems was negative aside from those in mentioned in the HPI.      MEDICATIONS: No current facility-administered medications on file prior to encounter.  acetaminophen (TYLENOL) 500 MG  tablet, Take 500-1,000 mg by mouth every 6 hours as needed for mild pain  amLODIPine (NORVASC) 2.5 MG tablet, Take 1 tablet (2.5 mg) by mouth daily  augmented betamethasone dipropionate (DIPROLENE AF) 0.05 % external cream, Apply topically 2 times daily as needed (skin rash)  BREO ELLIPTA 200-25 MCG/ACT inhaler, Inhale 1 puff into the lungs daily  Calcium Carb-Cholecalciferol (CALCIUM CARBONATE-VITAMIN D3) 600-400 MG-UNIT TABS, TAKE ONE TABLET BY MOUTH TWICE DAILY  carvedilol (COREG) 6.25 MG tablet, Take 1 tablet (6.25 mg) by mouth 2 times daily (with meals)  clopidogrel (PLAVIX) 75 MG tablet, Take 1 tablet (75 mg) by mouth daily  diphenhydrAMINE (BENADRYL) 25 MG tablet, take Benadryl 25-50 mg one hour prior to the procedure  empagliflozin (JARDIANCE) 10 MG TABS tablet, Take 1 tablet (10 mg) by mouth daily  ferrous sulfate (FEROSUL) 325 (65 Fe) MG tablet, Take 1 tablet (325 mg) by mouth every other day  gabapentin (NEURONTIN) 100 MG capsule, Take 1 capsule (100 mg) by mouth 3 times daily  lisinopril (ZESTRIL) 10 MG tablet, Take 1 tablet (10 mg) by mouth daily  nitroGLYcerin (NITROSTAT) 0.4 MG sublingual tablet, Place 1 tablet (0.4 mg) under the tongue See Admin Instructions for chest pain  omeprazole (PRILOSEC) 20 MG DR capsule, TAKE ONE CAPSULE BY MOUTH DAILY  polyethylene glycol (MIRALAX) 17 GM/Dose powder, Take 17 g by mouth daily Hold for diarrhea or loose stools (Patient taking differently: Take 17 g by mouth daily as needed for constipation Hold for diarrhea or loose stools)  rivaroxaban ANTICOAGULANT (XARELTO) 20 MG TABS tablet, Take 1 tablet (20 mg) by mouth daily (with dinner)  rosuvastatin (CRESTOR) 40 MG tablet, Take 1 tablet (40 mg) by mouth At Bedtime  senna-docusate (SENOKOT-S/PERICOLACE) 8.6-50 MG tablet, Take 1 tablet by mouth 2 times daily  senna-docusate (SENOKOT-S/PERICOLACE) 8.6-50 MG tablet, Take 1 tablet by mouth daily as needed for constipation  triamcinolone (KENALOG) 0.1 % external ointment,  Apply topically 2 times daily as needed for irritation Apply to back  nicotine (NICODERM CQ) 14 MG/24HR 24 hr patch, Place 1 patch onto the skin daily (Patient not taking: Reported on 11/21/2023)        ALLERGIES:   Allergies   Allergen Reactions    Contrast Dye Anaphylaxis     RASH, FACIAL AND NECK SWELLING, SOB, WHEEZING    Pantoprazole      Protonix caused diffuse edema    Chantix [Varenicline]      Terrible dreams    Penicillins Itching and Rash       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 clot    Post-menopausal     Reflux esophagitis 02/2004    EGD: esophagitis and medium HH    SBO (small bowel obstruction) (H) 08/10/2021    SVT (supraventricular tachycardia)     Thrombocytopenia (H24)     Uncomplicated asthma     Vitamin C deficiency 08/12/2018       Past Surgical History:   Procedure Laterality Date    ANGIOGRAM      ANGIOGRAM Right 6/23/2021    Procedure: RIGHT LOWER EXTREMITY ANGIOGRAM WITH LEFT BRACHIAL ARTERY CUTDOWN;  Surgeon: José Luis Hernandez MD;  Location:  OR    BIOPSY MASS NECK Right 10/11/2023    Procedure: Right Parotid Biopsy;  Surgeon: Randal Mendoza MD;  Location:  OR    BYPASS GRAFT FEMOROPOPLITEAL Right  09/23/2020    Procedure: RIGHT FEMORAL GRAFT TO ABOVE-KNEE POPLITEAL BYPASS USING CADAVERIC SUPERFICIAL FEMORAL ARTERY;  Surgeon: Chadwick Lozano MD;  Location:  OR    BYPASS GRAFT FEMOROPOPLITEAL Right 2/15/2022    Procedure: RIGHT SUPERFICIAL FEMORAL ARTERY GRAFT TO BELOW KNEE POPLITEAL BYPASS WITH CADAVERIC CRYOLIFE  FEMORAL-POPLITEAL ARTERY SIZE: OUTER DIAMETER: 7MM - 6MM;  Surgeon: Chadwick Lozano;  Location: [facility] OR    BYPASS GRAFT FEMOROPOPLITEAL Right 5/26/2023    Procedure: RIGHT DISTAL SUPERFICIAL FEMORAL GRAFT TO ANTERIOR TIBIAL ARTERY WITH 26 CENTIMETER CADAVERIC SUPERFICIAL FEMORAL ARTERY GRAFT;  Surgeon: Chadwick Lozano MD;  Location: [facility] OR    BYPASS GRAFT FEMOROPOPLITEAL Right 10/11/2023    Procedure: RIGHT FEMORAL TO POPLITEAL GRAFT THROMBECTOMY;  Surgeon: hCadwick Lozano MD;  Location:  OR    BYPASS GRAFT INSITU FEMOROPOPLITEAL Right 7/7/2021    Procedure: CREATION RIGHT FEMORAL TO POPLITEAL ARTERIAL BYPASS USING INSITU VEIN GRAFT;  Surgeon: José Luis Hernandez MD;  Location:  OR    CARDIAC CATHERIZATION  09/03/2009    multivessel CAD without target lesions, med mgmt indicated, preserved ef    CARPAL TUNNEL RELEASE RT/LT Right 05/20/2021    CV CORONARY ANGIOGRAM N/A 10/4/2023    Procedure: Coronary Angiogram;  Surgeon: Rogelio Ricks MD;  Location:  HEART CARDIAC CATH LAB    CV PCI N/A 10/4/2023    Procedure: Percutaneous Coronary Intervention;  Surgeon: Rogelio Ricks MD;  Location:  HEART CARDIAC CATH LAB    EMBOLECTOMY LOWER EXTREMITY Right 10/6/2023    Procedure: Right anterior tibial bypass with graft, Right tibial endarterectomy,thrombectomy, Right doraslis pedis thrombectomy, Anterior Tibial vein patch.;  Surgeon: Chadwick Lozano MD;  Location:  OR    ENDARTERECTOMY CAROTID Right 05/11/2016    Procedure: ENDARTERECTOMY CAROTID;  Surgeon: Chadwick Lozano MD;  Location:  OR    ENDARTERECTOMY CAROTID Left 06/08/2020     Procedure: LEFT CAROTID ENDARTERECTOMY with distal facal vein patch  and EEG;  Surgeon: Chadwick Lozano MD;  Location:  OR    FINE NEEDLE ASPIRATION WITHOUT IMAGING GUIDANCE Right 9/22/2023    Procedure: Fine needle aspiration without imaging guidance;  Surgeon: Kiersten Aguilera MD;  Location: [facility]    FLUORESCENCE INTRAOPERATIVE VASCULAR ANGIOGRAPHY Right 12/28/2022    Procedure: RIGHT LEG ANGIOGRAM with intervention;  Surgeon: Chadwick Lozano MD;  Location:  OR    GYN SURGERY  left tube    left salpingectomy    IR ANGIOGRAM THROUGH CATHETER (ARTERIAL)  10/6/2023    IR ANGIOGRAM THROUGH CATHETER (ARTERIAL)  10/6/2023    IR LOWER EXTREMITY ANGIOGRAM RIGHT  05/25/2021    IR LOWER EXTREMITY ANGIOGRAM RIGHT  10/5/2023    IR OR ANGIOGRAM  6/23/2021    IR OR ANGIOGRAM  12/28/2022    LAPAROSCOPIC CHOLECYSTECTOMY N/A 09/27/2017    Procedure: LAPAROSCOPIC CHOLECYSTECTOMY;  LAPAROSCOPIC CHOLECYSTECTOMY;  Surgeon: Jacoby Tapia MD;  Location: [facility]    LAPAROSCOPY DIAGNOSTIC (GENERAL) N/A 8/11/2021    Procedure: Exploratory laparoscopy,  laparoscopic lysis of adhesions, laparotomy;  Surgeon: Corina Ferreira MD;  Location:  OR    OR ANGIOGRAM, LOWER EXTREMITY Right 10/11/2023    Procedure: Or Angiogram, Lower Extremity;  Surgeon: Chadwick Lozano MD;  Location:  OR    ORTHOPEDIC SURGERY      left knee surgery    REPAIR ANEURYSM FEMORAL ARTERY Left 12/28/2022    Procedure: REPAIR LEFT FEMORAL PSEUDOANEURYSM;  Surgeon: Chadwick Lozano MD;  Location:  OR    VASCULAR SURGERY  aoto bi fem  left fem-AK pop    ZZC FABRIC WRAPPING OF ABDOMINAL ANEURYSM      [facility] NONSPECIFIC PROCEDURE  12/2000    angioplasty with stent right fem. a.    ZZC NONSPECIFIC PROCEDURE  1987    sinus surgery    Z NONSPECIFIC PROCEDURE  09/02/2009    Emergent left groin exploration with oversewing of bleeding angiographic site. 2. Endarterectomy of common femoral-proximal superficial femoral artery with  greater saphenous vein patch angioplasty.   a. [place] of accessory right greater saphenous vein.     [facility] NONSPECIFIC PROCEDURE  08/27/2009    occluded left common iliac and external iliac arteries were successfully revascularized with stenting to 8 and 7 mm          SOCIAL HISTORY:  Social History     Tobacco Use    Smoking status: Some Days     Packs/day: 0.50     Years: 52.00     Additional pack years: 0.00     Total pack years: 26.00     Types: Cigarettes     Start date: 1968    Smokeless tobacco: Never    Tobacco comments:     1/2 PPD   Vaping Use    Vaping Use: Never used   Substance Use Topics    Alcohol use: Not Currently     Comment: x1-2 yr    Drug use: Yes     Types: Marijuana     Comment: 2x per day       FAMILY HISTORY:  Family History   Problem Relation Age of Onset    Cancer Mother         bladder    Cardiovascular Father         alive,multiple heart attacks    Diabetes Maternal Grandmother     Lung Cancer Maternal Grandmother     Blood Disease Brother         clotting disorder       PHYSICAL EXAM:   /57   Pulse 56   Temp 97.9  F (36.6  C)   Resp 16   Wt 50.8 kg (112 lb)   LMP  (LMP Unknown)   SpO2 92%   BMI 20.65 kg/m      Constitutional: NAD, comfortable  Cardiovascular: RRR, normal S1 and S2, no r/c/g/m  Respiratory: CTAB  Psychiatric: mentation appears normal and affect normal  Head: Normocephalic. Atraumatic.    Neck: Neck supple. No adenopathy. Thyroid symmetric, normal size, trachea midline  Eyes:  PERRL, no icterus  ENT: Hearing adequate, pharynx normal without erythema or exudate  Abdomen:   Auscultation: + BS  Appearance: non-distended  Palpation: non-tender  NEURO: grossly negative  SKIN: no suspicious lesions or rashes  LYMPH:   anterior cervical: no adenopathy  posterior cervical: no adenopathy  supraclavicular: no adenopathy          ADDITIONAL COMMENTS:   I reviewed the patient's new clinical lab test results.   Recent Labs   Lab Test 12/11/23  0550 12/10/23  7908  12/10/23  1635 11/13/23  1705 10/07/23  1704 10/07/23  0516 10/06/23  1928 10/06/23  0551 10/05/23  1014   WBC 4.8  --  5.4 6.3   < > 7.7   < > 9.3  --    HGB 8.0* 6.6* 6.0* 10.5*   < > 10.0*   < > 10.8*  --    MCV 86  --  89 94   < > 87   < > 87  --      --  259 236   < > 120*   < > 177  --    INR  --   --   --   --   --  1.38*  --  1.08 0.95    < > = values in this interval not displayed.     Recent Labs   Lab Test 12/11/23  0550 12/10/23  2202 12/10/23  1635 10/14/23  0631 10/13/23  0607   *  --  131*  --  136   POTASSIUM 4.4  --  4.5 4.1 4.0   CHLORIDE 104  --  100  --  101   CO2 19*  --  20*  --  24   BUN 24.9*  --  23.4*  --  11.4   CR 0.87  --  0.79 0.88 0.92   ANIONGAP 10  --  11  --  11   SONIA 8.3*  --  8.9  --  8.4*   GLC 86 119* 94  --  112*     Recent Labs   Lab Test 10/07/23  1950 10/03/23  0737 12/09/22  1444 08/10/21  0833 06/11/20  0810 06/10/20  1243 09/18/19  0739 02/04/19  0935 02/02/19  0615 02/01/19  1118 02/01/19  0845 07/30/18  0923 08/14/17  1028 04/22/16  0820 03/04/16  0335   ALBUMIN 2.9* 4.4 3.9 3.6   < > 3.6   < >  --    < >  --  4.3   < > 3.8   < > 3.1*   BILITOTAL 0.2 0.9 0.2 0.5   < > 0.5   < >  --    < >  --  0.6   < > 0.3   < > 0.6   DBIL <0.20  --   --   --   --  0.1  --   --   --   --   --   --   --   --  <0.1   ALT 15 22 22 24   < > 30   < >  --    < >  --  75*   < > 37   < > 37   AST 18 25 24 19   < > 21   < >  --    < >  --  39   < > 22   < > 19   ALKPHOS 35 82 80 70   < > 82   < >  --    < >  --  63   < > 66   < > 50   PROTEIN  --   --   --   --   --   --   --  Negative  --  Negative  --   --   --   --   --    LIPASE  --   --   --  132  --  63*  --   --   --   --  101  --  135   < >  --    AMYLASE  --   --   --   --   --   --   --   --   --   --   --   --  46  --   --     < > = values in this interval not displayed.             .    CONSULTATION ASSESSMENT AND PLAN:      64 yo female with complex past medical history most recently diagnosed with malignant B-cell  lymphoma of the neck and recent surgery in 10/2023 for acute occlusion of the RLE with embolectomy and thrombectomy.  She is maintained on Plavix and Xarelto.  She present with right lower extremity pain.  Imaging shows patent grafts.  Also, with complaints of shortness of breath on exertion.  Found to have hemoglobin of 6.6.  She was transfused 2 units and hemoglobin responded appropriately to 8.  She has heme + stool without overt signs of bleeding.  Colonoscopy in 2019 with multiple polyps, EGD in 2004 with esophagitis.    -  Monitor H/H; transfuse prn.  -  Patient would benefit from EGD and colonoscopy.  -  Clear liquid diet today.  Prep this afternoon.  NPO after midnight.  -  Agree with holding Xarelto and Plavix.  Bridge with heparin.    Total Time Spent: 35 minutes    I discussed the patient's findings and plan with Dr. Hoffman.          Shavonne Rose, PAC  [facility]  Cell:  [phone], not available after 11:30AM at this number  Dr. Hoffman is covering the afternoon and tomorrow.

## 2023-12-11 NOTE — PHARMACY-ADMISSION MEDICATION HISTORY
Pharmacist Admission Medication History    Admission medication history is complete. The information provided in this note is only as accurate as the sources available at the time of the update.    Information Source(s): Patient and CareEverywhere/SureScripts via in-person    Pertinent Information: Patient is unsure if her daily meds are taken in the morning or evening. She did take her morning meds today    Changes made to PTA medication list:  Added: senna s to scheduled  Deleted: prolia, xarelto 15mg bid  Changed: miralax to prn    Allergies reviewed with patient and updates made in EHR: yes    Medication History Completed By: Santhosh Dubon RPH 12/10/2023 8:42 PM    PTA Med List   Medication Sig Note Last Dose    acetaminophen (TYLENOL) 500 MG tablet Take 500-1,000 mg by mouth every 6 hours as needed for mild pain  Unknown    amLODIPine (NORVASC) 2.5 MG tablet Take 1 tablet (2.5 mg) by mouth daily  12/10/2023?    augmented betamethasone dipropionate (DIPROLENE AF) 0.05 % external cream Apply topically 2 times daily as needed (skin rash)  Unknown    BREO ELLIPTA 200-25 MCG/ACT inhaler Inhale 1 puff into the lungs daily  12/10/2023 at am    Calcium Carb-Cholecalciferol (CALCIUM CARBONATE-VITAMIN D3) 600-400 MG-UNIT TABS TAKE ONE TABLET BY MOUTH TWICE DAILY  12/10/2023 at am    carvedilol (COREG) 6.25 MG tablet Take 1 tablet (6.25 mg) by mouth 2 times daily (with meals)  12/10/2023 at am    clopidogrel (PLAVIX) 75 MG tablet Take 1 tablet (75 mg) by mouth daily  12/10/2023?    diphenhydrAMINE (BENADRYL) 25 MG tablet take Benadryl 25-50 mg one hour prior to the procedure 12/10/2023: Before contrast dye    Unknown    empagliflozin (JARDIANCE) 10 MG TABS tablet Take 1 tablet (10 mg) by mouth daily  12/10/2023?    ferrous sulfate (FEROSUL) 325 (65 Fe) MG tablet Take 1 tablet (325 mg) by mouth every other day  12/10/2023    gabapentin (NEURONTIN) 100 MG capsule Take 1 capsule (100 mg) by mouth 3 times daily  12/10/2023  at am    lisinopril (ZESTRIL) 10 MG tablet Take 1 tablet (10 mg) by mouth daily  12/10/2023?    nitroGLYcerin (NITROSTAT) 0.4 MG sublingual tablet Place 1 tablet (0.4 mg) under the tongue See Admin Instructions for chest pain  Unknown    omeprazole (PRILOSEC) 20 MG DR capsule TAKE ONE CAPSULE BY MOUTH DAILY  12/10/2023 at am    polyethylene glycol (MIRALAX) 17 GM/Dose powder Take 17 g by mouth daily Hold for diarrhea or loose stools (Patient taking differently: Take 17 g by mouth daily as needed for constipation Hold for diarrhea or loose stools)  Unknown    rivaroxaban ANTICOAGULANT (XARELTO) 20 MG TABS tablet Take 1 tablet (20 mg) by mouth daily (with dinner)  12/9/2023 at pm    rosuvastatin (CRESTOR) 40 MG tablet Take 1 tablet (40 mg) by mouth At Bedtime  12/9/2023    senna-docusate (SENOKOT-S/PERICOLACE) 8.6-50 MG tablet Take 1 tablet by mouth 2 times daily  12/10/2023 at am    senna-docusate (SENOKOT-S/PERICOLACE) 8.6-50 MG tablet Take 1 tablet by mouth daily as needed for constipation  Unknown at am    triamcinolone (KENALOG) 0.1 % external ointment Apply topically 2 times daily as needed for irritation Apply to back  Unknown

## 2023-12-11 NOTE — PROVIDER NOTIFICATION
MD Notification    Notified Person: MD    Notified Person Name:  Vadim Ramirez  Notification Date/Time:  12/11/23 @ 0815  Notification Interaction:  [employer] messaging  Purpose of Notification:  Midline ordered on pt yesterday for lab draws. Midlines not reliable line for labs. Recommend PICC line if lab department unable to obtain blood specimens.  Orders Received:  Awaiting response  Comments:

## 2023-12-11 NOTE — CONSULTS
VASCULAR SURGERY CONSULT NOTE    VASCULAR SURGEON: Dr. Lozano    LOCATION:  [facility]      Shirley Hendricks  Medical Record #:  [id number]  YOB: 1960  Age:  63 year old     DATE OF SERVICE: 12/10/2023    PCP: Vasquez Benoit      REASON FOR CONSULTATION: History of bypass graft    IMPRESSION: This is a 63-year-old female known to our service for her femoral AT bypass with PTFE in October that was complicated by graft thrombosis requiring thrombectomy and revision.    The patient came to the ED due to symptoms of thigh pain and foot pain that sounds like rest pain.  Ultrasound with patent graft with physical exam supporting it.  The patient was found to be anemic and 6 of hemoglobin status post transfusion with Hemoccult positive stools.    RECOMMENDATION:  - Okay with bridging with heparin drip.  - Will continue to follow the patient while inpatient.  - Appreciate care from other consultants and hospitalist.    Discussed with staff, Dr. Blake Berg MD      HPI:  Shirley Hendricks is a 63 year old female who was seen today in consultation for right thigh claudication pain and rest pain since last week with inability to walk well.  The patient is endorsing even difficulty doing grocery shopping.    Given that, the patient was called to come to the ED for evaluation.  Graft is patent without signs of stenosis.  However, the patient was found to have a hemoglobin of 6.6 with Hemoccult positive stools now status post transfusion.    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 clot    Post-menopausal     Reflux esophagitis 02/2004    EGD: esophagitis and medium HH    SBO (small bowel obstruction) (H) 08/10/2021    SVT (supraventricular tachycardia)     Thrombocytopenia (H24)     Uncomplicated asthma     Vitamin C deficiency 08/12/2018        Past Surgical History:   Procedure Laterality Date    ANGIOGRAM      ANGIOGRAM Right 6/23/2021    Procedure: RIGHT LOWER EXTREMITY ANGIOGRAM WITH LEFT BRACHIAL ARTERY CUTDOWN;  Surgeon: José Luis Hernandez MD;  Location: [facility] OR    BIOPSY MASS NECK Right 10/11/2023    Procedure: Right Parotid Biopsy;  Surgeon: Randal Mendoza MD;  Location: [facility] OR    BYPASS GRAFT FEMOROPOPLITEAL Right 09/23/2020    Procedure: RIGHT FEMORAL GRAFT TO ABOVE-KNEE POPLITEAL BYPASS USING CADAVERIC SUPERFICIAL FEMORAL ARTERY;  Surgeon: Chadwick Lozano MD;  Location: [facility] OR    BYPASS GRAFT FEMOROPOPLITEAL Right 2/15/2022    Procedure: RIGHT SUPERFICIAL FEMORAL ARTERY GRAFT TO BELOW KNEE POPLITEAL BYPASS WITH CADAVERIC CRYOLIFE  FEMORAL-POPLITEAL ARTERY SIZE: OUTER DIAMETER: 7MM - 6MM;  Surgeon: Chadwick Lozano;  Location: [facility] OR    BYPASS GRAFT FEMOROPOPLITEAL Right 5/26/2023    Procedure: RIGHT DISTAL SUPERFICIAL FEMORAL GRAFT TO ANTERIOR TIBIAL ARTERY WITH 26 CENTIMETER CADAVERIC SUPERFICIAL FEMORAL ARTERY GRAFT;  Surgeon: Chadwick Lozano MD;  Location: [facility] OR    BYPASS GRAFT FEMOROPOPLITEAL Right 10/11/2023    Procedure: RIGHT FEMORAL TO POPLITEAL GRAFT  THROMBECTOMY;  Surgeon: Chadwick Lozano MD;  Location:  OR    BYPASS GRAFT INSITU FEMOROPOPLITEAL Right 7/7/2021    Procedure: CREATION RIGHT FEMORAL TO POPLITEAL ARTERIAL BYPASS USING INSITU VEIN GRAFT;  Surgeon: José Luis Hernandez MD;  Location:  OR    CARDIAC CATHERIZATION  09/03/2009    multivessel CAD without target lesions, med mgmt indicated, preserved ef    CARPAL TUNNEL RELEASE RT/LT Right 05/20/2021    CV CORONARY ANGIOGRAM N/A 10/4/2023    Procedure: Coronary Angiogram;  Surgeon: Rogelio Ricks MD;  Location:  HEART CARDIAC CATH LAB    CV PCI N/A 10/4/2023    Procedure: Percutaneous Coronary Intervention;  Surgeon: Rogelio Ricks MD;  Location:  HEART CARDIAC CATH LAB    EMBOLECTOMY LOWER EXTREMITY Right 10/6/2023    Procedure: Right anterior tibial bypass with graft, Right tibial endarterectomy,thrombectomy, Right doraslis pedis thrombectomy, Anterior Tibial vein patch.;  Surgeon: Chadwick Lozano MD;  Location:  OR    ENDARTERECTOMY CAROTID Right 05/11/2016    Procedure: ENDARTERECTOMY CAROTID;  Surgeon: Chadwick Lozano MD;  Location:  OR    ENDARTERECTOMY CAROTID Left 06/08/2020    Procedure: LEFT CAROTID ENDARTERECTOMY with distal facal vein patch  and EEG;  Surgeon: Chadwick Lozano MD;  Location:  OR    FINE NEEDLE ASPIRATION WITHOUT IMAGING GUIDANCE Right 9/22/2023    Procedure: Fine needle aspiration without imaging guidance;  Surgeon: Kiersten Aguilera MD;  Location:  GI    FLUORESCENCE INTRAOPERATIVE VASCULAR ANGIOGRAPHY Right 12/28/2022    Procedure: RIGHT LEG ANGIOGRAM with intervention;  Surgeon: Chadwick Lozano MD;  Location:  OR    GYN SURGERY  left tube    left salpingectomy    IR ANGIOGRAM THROUGH CATHETER (ARTERIAL)  10/6/2023    IR ANGIOGRAM THROUGH CATHETER (ARTERIAL)  10/6/2023    IR LOWER EXTREMITY ANGIOGRAM RIGHT  05/25/2021    IR LOWER EXTREMITY ANGIOGRAM RIGHT  10/5/2023    IR OR ANGIOGRAM  6/23/2021     IR OR ANGIOGRAM  12/28/2022    LAPAROSCOPIC CHOLECYSTECTOMY N/A 09/27/2017    Procedure: LAPAROSCOPIC CHOLECYSTECTOMY;  LAPAROSCOPIC CHOLECYSTECTOMY;  Surgeon: Jacoby Tapia MD;  Location:  [facility]    LAPAROSCOPY DIAGNOSTIC (GENERAL) N/A 8/11/2021    Procedure: Exploratory laparoscopy,  laparoscopic lysis of adhesions, laparotomy;  Surgeon: Corina Ferreira MD;  Location:  OR    OR ANGIOGRAM, LOWER EXTREMITY Right 10/11/2023    Procedure: Or Angiogram, Lower Extremity;  Surgeon: Chadwick Lozano MD;  Location:  OR    ORTHOPEDIC SURGERY      left knee surgery    REPAIR ANEURYSM FEMORAL ARTERY Left 12/28/2022    Procedure: REPAIR LEFT FEMORAL PSEUDOANEURYSM;  Surgeon: Chadwick Lozano MD;  Location:  OR    VASCULAR SURGERY  aoto bi fem  left fem-AK pop    [facility] FABRIC WRAPPING OF ABDOMINAL ANEURYSM      [facility] NONSPECIFIC PROCEDURE  12/2000    angioplasty with stent right fem. a.    [facility] NONSPECIFIC PROCEDURE  1987    sinus surgery    [facility] NONSPECIFIC PROCEDURE  09/02/2009    Emergent left groin exploration with oversewing of bleeding angiographic site. 2. Endarterectomy of common femoral-proximal superficial femoral artery with greater saphenous vein patch angioplasty.   a. [place] of accessory right greater saphenous vein.     [facility] NONSPECIFIC PROCEDURE  08/27/2009    occluded left common iliac and external iliac arteries were successfully revascularized with stenting to 8 and 7 mm        ALLERGIES:    Allergies   Allergen Reactions    Contrast Dye Anaphylaxis     RASH, FACIAL AND NECK SWELLING, SOB, WHEEZING    Pantoprazole      Protonix caused diffuse edema    Chantix [Varenicline]      Terrible dreams    Penicillins Itching and Rash       MEDS:    Current Facility-Administered Medications:     acetaminophen (TYLENOL) tablet 1,000 mg, 1,000 mg, Oral, Q8H, Eli Peraza MD, 1,000 mg at 12/11/23 7250    amLODIPine (NORVASC) tablet 2.5 mg, 2.5 mg, Oral, Daily, Eli Peraza MD, 2.5 mg at 12/11/23  0922    calcium carbonate (TUMS) chewable tablet 1,000 mg, 1,000 mg, Oral, 4x Daily PRN, Eli Peraza MD    carvedilol (COREG) tablet 6.25 mg, 6.25 mg, Oral, BID w/meals, Eli Peraza MD, 6.25 mg at 12/10/23 2313    famotidine (PEPCID) injection 40 mg, 40 mg, Intravenous, Q12H, Eli Peraza MD, 40 mg at 12/11/23 0922    ferrous sulfate (FEROSUL) tablet 325 mg, 325 mg, Oral, Every Other Day, Eli Peraza MD, 325 mg at 12/11/23 0922    fluticasone-vilanterol (BREO ELLIPTA) 200-25 MCG/ACT inhaler 1 puff, 1 puff, Inhalation, Daily, Eli Peraza MD, 1 puff at 12/11/23 0928    gabapentin (NEURONTIN) capsule 100 mg, 100 mg, Oral, TID, Eli Peraza MD, 100 mg at 12/11/23 0922    heparin infusion 25,000 units in D5W 250 mL ANTICOAGULANT, 0-5,000 Units/hr, Intravenous, Continuous, Eli Peraza MD, Last Rate: 7.5 mL/hr at 12/11/23 0317, 750 Units/hr at 12/11/23 0317    HYDROmorphone (PF) (DILAUDID) injection 0.3 mg, 0.3 mg, Intravenous, Q2H PRN, Eli Peraza MD    lidocaine (LMX4) cream, , Topical, Q1H PRN, Eli Peraza MD    lidocaine (LMX4) cream, , Topical, Q1H PRN, Eli Peraza MD    lidocaine 1 % 0.1-1 mL, 0.1-1 mL, Other, Q1H PRN, Eli Peraza MD    lidocaine 1 % 0.1-1 mL, 0.1-1 mL, Other, Q1H PRN, Eli Peraza MD    LORazepam (ATIVAN) tablet 0.5-1 mg, 0.5-1 mg, Oral, Q4H PRN, Eli Peraza MD, 0.5 mg at 12/11/23 0922    [COMPLETED] bisacodyl (DULCOLAX) EC tablet 10 mg, 10 mg, Oral, Once, 10 mg at 12/11/23 0923 **FOLLOWED BY** polyethylene glycol (MIRALAX) powder 238 g, 238 g, Oral, Once **FOLLOWED BY** [START ON 12/12/2023] magnesium citrate solution 296 mL, 296 mL, Oral, Once, Shavonne Rose PA-C    naloxone (NARCAN) injection 0.2 mg, 0.2 mg, Intravenous, Q2 Min PRN **OR** naloxone (NARCAN) injection 0.4 mg, 0.4 mg, Intravenous, Q2 Min PRN **OR** naloxone (NARCAN) injection 0.2 mg, 0.2 mg, Intramuscular, Q2 Min PRN **OR** naloxone (NARCAN) injection 0.4 mg, 0.4 mg, Intramuscular, Q2 Min PRN,  Eli Peraza MD    nicotine (NICODERM CQ) 14 MG/24HR 24 hr patch 1 patch, 1 patch, Transdermal, Daily, Eli Peraza MD, 1 patch at 12/11/23 0929    nicotine Patch in Place, , Transdermal, Q8H, Eli Peraza MD    nitroGLYcerin (NITROSTAT) sublingual tablet 0.4 mg, 0.4 mg, Sublingual, Q5 Min PRN, Eli Peraza MD    omeprazole (PriLOSEC) CR capsule 40 mg, 40 mg, Oral, BID AC, Eli Peraza MD, 40 mg at 12/11/23 0923    ondansetron (ZOFRAN ODT) ODT tab 4 mg, 4 mg, Oral, Q6H PRN **OR** ondansetron (ZOFRAN) injection 4 mg, 4 mg, Intravenous, Q6H PRN, Eli Peraza MD    oxyCODONE (ROXICODONE) tablet 5 mg, 5 mg, Oral, Q4H PRN, Eli Peraza MD, 5 mg at 12/11/23 0435    Patient is already receiving anticoagulation with heparin, enoxaparin (LOVENOX), warfarin (COUMADIN)  or other anticoagulant medication, , Does not apply, Continuous PRN, Eli Peraza MD    polyethylene glycol (MIRALAX) Packet 17 g, 17 g, Oral, BID PRN, Eli Peraza MD    prochlorperazine (COMPAZINE) injection 5 mg, 5 mg, Intravenous, Q6H PRN **OR** prochlorperazine (COMPAZINE) tablet 5 mg, 5 mg, Oral, Q6H PRN **OR** prochlorperazine (COMPAZINE) suppository 12.5 mg, 12.5 mg, Rectal, Q12H PRN, Eli Peraza MD    rosuvastatin (CRESTOR) tablet 40 mg, 40 mg, Oral, At Bedtime, Eli Peraza MD, 40 mg at 12/10/23 2313    senna-docusate (SENOKOT-S/PERICOLACE) 8.6-50 MG per tablet 1 tablet, 1 tablet, Oral, BID PRN **OR** senna-docusate (SENOKOT-S/PERICOLACE) 8.6-50 MG per tablet 2 tablet, 2 tablet, Oral, BID PRN, Eli Peraza MD    senna-docusate (SENOKOT-S/PERICOLACE) 8.6-50 MG per tablet 1 tablet, 1 tablet, Oral, BID, Eli Peraza MD, 1 tablet at 12/11/23 0923    sodium chloride (PF) 0.9% PF flush 3 mL, 3 mL, Intracatheter, Q8H, Eli Peraza MD, 3 mL at 12/11/23 0931    sodium chloride (PF) 0.9% PF flush 3 mL, 3 mL, Intracatheter, q1 min prn, Eli Peraza MD    sodium chloride (PF) 0.9% PF flush 3 mL, 3 mL, Intracatheter, Q8H, Karmen  MD Eli    sodium chloride (PF) 0.9% PF flush 3 mL, 3 mL, Intracatheter, q1 min prn, Eli Peraza MD    sodium chloride 0.9% BOLUS 1-250 mL, 1-250 mL, Intravenous, Q1H PRN, Eli Peraza MD, 250 mL at 12/10/23 2115    triamcinolone (KENALOG) 0.1 % ointment, , Topical, BID PRN, Shayy Law MD    SOCIAL HABITS:   Social History    Substance and Sexual Activity      Alcohol use: Not Currently        Comment: x1-2 yr      History   Drug Use    Types: Marijuana     Comment: 2x per day      History   Smoking Status    Some Days    Packs/day: 0.50    Years: 52.00    Types: Cigarettes    Start date: 1968   Smokeless Tobacco    Never        FAMILY HISTORY:    Family History   Problem Relation Age of Onset    Cancer Mother         bladder    Cardiovascular Father         alive,multiple heart attacks    Diabetes Maternal Grandmother     Lung Cancer Maternal Grandmother     Blood Disease Brother         clotting disorder       PE:  /66   Pulse 54   Temp 97.3  F (36.3  C) (Oral)   Resp 13   Wt 50.8 kg (112 lb)   LMP  (LMP Unknown)   SpO2 94%   BMI 20.65 kg/m    Wt Readings from Last 1 Encounters:   12/10/23 50.8 kg (112 lb)     Body mass index is 20.65 kg/m .    EXAM:  GENERAL: This is a well-developed 63 year old female who appears her stated age  EYES: Grossly normal.  CARDIAC:  Warm, well-perfused, RRR, biphasic signals throughout the graft, palpable, right DP with triphasic signals  ABDOMEN: Soft, non-tender, B/S present, no pulsatile mass  MUSCULOSKELETAL: Grossly normal and both lower extremities are intact.  HEME/LYMPH: No lymphedema  NEUROLOGIC: Focally intact, Alert and oriented x 3.   PSYCH: appropriate affect  INTEGUMENT: No open lesions or ulcers        DIAGNOSTIC STUDIES:     Images:  US Lower Extremity Arterial Duplex Bilateral    Result Date: 12/10/2023  EXAM: US LOWER EXTREMITY ARTERIAL DUPLEX BILATERAL LOCATION: [facility] DATE: 12/10/2023 INDICATION: R>L  pain, chronic severe PAD COMPARISON: Ultrasound 10/04/2023 TECHNIQUE: Duplex utilizing 2D gray-scale imaging, Doppler interrogation with color-flow and spectral waveform analysis. FINDINGS: RIGHT - Status post [id number] artery bypass graft with subsequent distal jump graft to the tibioperoneal trunk. The distinct grafts are not imaged separately but appear grossly patent. Waveforms are diffusely monophasic with some occasional blunting. Anterior tibial and dorsalis pedis arteries are patent on today's exam, new in comparison to ultrasound on 10/04/2023. LEFT - Status post xffnjye-[id number]-uvgcclfyk artery bypass graft appears which patent with increased velocities at both the proximal and distal anastomoses. The arteries at and below the knee are patent, although waveforms are largely monophasic. Dorsalis pedis artery is noted to be biphasic. RIGHT LOWER EXTREMITY ARTERIAL ASSESSMENT: Common femoral artery: 63/14 cm/s Proximal graft: 82/16 cm/s Mid graft: 43/5 cm/s Distal graft: 36/9 cm/s Anterior tibial artery: 139/8 cm/s Posterior tibial artery: Occluded. Dorsalis pedis artery: 73/8 cm/s Peroneal artery: 21 cm/s, although flow appears to be reversed LEFT LOWER EXTREMITY ARTERIAL ASSESSMENT: Common femoral artery: 46/7 cm/s Proximal graft: 171/17 cm/s Mid graft: 111/7 cm/s Distal graft: 209/6 cm/s SFA (distal): 143/0 cm/s Popliteal artery (proximal): 165/0 cm/s Popliteal artery (distal): 138/11 cm/s Anterior tibial artery: 76/0 cm/s Posterior tibial artery: 135/0 cm/s Dorsalis pedis artery: 42/0 cm/s Peroneal artery:  48/9 cm/s     IMPRESSION: 1.  Patent bilateral vascular arterial grafts as above without evidence of critical stenosis or occlusion.     XR Chest 2 Views    Result Date: 12/10/2023  EXAM: XR CHEST 2 VIEWS LOCATION: [facility] DATE: 12/10/2023 INDICATION: dyspnea COMPARISON: Chest radiograph 12/09/2022, CT 10/30/2023     IMPRESSION: No evidence of acute cardiopulmonary  disease. Cholecystectomy.        LABS:      Sodium   Date Value Ref Range Status   12/11/2023 133 (L) 135 - 145 mmol/L Final     Comment:     Reference intervals for this test were updated on 09/26/2023 to more accurately reflect our healthy population. There may be differences in the flagging of prior results with similar values performed with this method. Interpretation of those prior results can be made in the context of the updated reference intervals.    12/10/2023 131 (L) 135 - 145 mmol/L Final     Comment:     Reference intervals for this test were updated on 09/26/2023 to more accurately reflect our healthy population. There may be differences in the flagging of prior results with similar values performed with this method. Interpretation of those prior results can be made in the context of the updated reference intervals.    10/13/2023 136 135 - 145 mmol/L Final     Comment:     Reference intervals for this test were updated on 09/26/2023 to more accurately reflect our healthy population. There may be differences in the flagging of prior results with similar values performed with this method. Interpretation of those prior results can be made in the context of the updated reference intervals.    07/09/2021 132 (L) 133 - 144 mmol/L Final   07/08/2021 128 (L) 133 - 144 mmol/L Final   07/07/2021 134 133 - 144 mmol/L Final     Urea Nitrogen   Date Value Ref Range Status   12/11/2023 24.9 (H) 8.0 - 23.0 mg/dL Final   12/10/2023 23.4 (H) 8.0 - 23.0 mg/dL Final   10/13/2023 11.4 8.0 - 23.0 mg/dL Final   12/29/2022 17 7 - 30 mg/dL Final   02/11/2022 14 7 - 30 mg/dL Final   08/15/2021 8 7 - 30 mg/dL Final   07/09/2021 13 7 - 30 mg/dL Final   07/08/2021 13 7 - 30 mg/dL Final   06/21/2021 13 7 - 30 mg/dL Final     Hemoglobin   Date Value Ref Range Status   12/11/2023 8.0 (L) 11.7 - 15.7 g/dL Final   12/10/2023 6.6 (LL) 11.7 - 15.7 g/dL Final   12/10/2023 6.0 (LL) 11.7 - 15.7 g/dL Final   07/09/2021 8.8 (L) 11.7 - 15.7  g/dL Final   07/08/2021 8.8 (L) 11.7 - 15.7 g/dL Final   06/21/2021 12.4 11.7 - 15.7 g/dL Final     Platelet Count   Date Value Ref Range Status   12/11/2023 204 150 - 450 10e3/uL Final   12/10/2023 259 150 - 450 10e3/uL Final   11/13/2023 236 150 - 450 10e3/uL Final   07/09/2021 169 150 - 450 10e9/L Final   07/08/2021 158 150 - 450 10e9/L Final   06/21/2021 124 (L) 150 - 450 10e9/L Final     INR   Date Value Ref Range Status   10/07/2023 1.38 (H) 0.85 - 1.15 Final   10/06/2023 1.08 0.85 - 1.15 Final   10/05/2023 0.95 0.85 - 1.15 Final   09/24/2020 1.01 0.86 - 1.14 Final   05/10/2016 0.95 0.86 - 1.14 Final   04/21/2016 1.02 0.86 - 1.14 Final     Kalpana Berg MD    STAFF: Patient examined.  Very well-known to me from multiple vascular issues.  This includes a remote aortobifemoral bypass graft and left femoral to popliteal in situ bypass graft.  Has had several occlusions of her right graft and now has a PTFE from the right groin to the mid anterior tibial artery.  Due to limited outflow has been on Eliquis anticoagulation.    Now admitted for anemia most likely from GI bleeding associated with her anticoagulation.  Eliquis is on hold and she is on heparin.    Reports that her foot is felt fine.  All surgical sites have healed well.  Good Doppler signal within graft and distal AT/DP.  I cannot palpate right DP that I did notice previously in the clinic but is mention good Doppler signals.  +3 left graft and DP pulse.    By duplex graft is patent with no obvious stenosis.  Lower flow was noted in PTFE graft which is not uncommon due to the conduit.    We will continue to follow.  Patient does not require anticoagulation to prevent right graft failure.      Chadwick Lozano MD     ADDENDUM: I spoke with Dr. Hoffman who plans EGD and colonoscopy tomorrow due to bleeding.  Will need to hold heparin for 4 hours prior to the procedure.  Will restart as soon as possible postprocedure.  Some concerned about this due to  her bypass grafts but they have been functioning well and obviously anticoagulation needs to be stopped particular for biopsy or other interventions necessary.       Chadwick Lozano MD

## 2023-12-11 NOTE — ED NOTES
[facility]  ED Nurse Handoff Report    ED Chief complaint: Leg Pain      ED Diagnosis:   Final diagnoses:   None       Code Status: Full Code    Allergies:   Allergies   Allergen Reactions    Contrast Dye Anaphylaxis     RASH, FACIAL AND NECK SWELLING, SOB, WHEEZING    Pantoprazole      Protonix caused diffuse edema    Chantix [Varenicline]      Terrible dreams    Penicillins Itching       Patient Story: pt presents with right leg pain that started about 1 week ago, pt also recently had bypass surgery. Sent by vascular md to evaluate for occlusion . Pt is on blood thinners.  Focused Assessment:  pt hemoglobin came back at 6 transfusion started occult also positive. Pt given famotadine. Us shows negative for lower extremity occulusion     Treatments and/or interventions provided: blood work imaging trnafusion   Patient's response to treatments and/or interventions: pain controlled     To be done/followed up on inpatient unit:  hematology and gi consult     Does this patient have any cognitive concerns?:  none     Activity level - Baseline/Home:  Independent  Activity Level - Current:   Stand with Assist    Patient's Preferred language: English   Needed?: No    Isolation: None  Infection: Not Applicable  Patient tested for COVID 19 prior to admission: NO  Bariatric?: No    Vital Signs:   Vitals:    12/10/23 1557 12/10/23 1700   BP: (!) 127/37    Pulse: 64    Resp: 14    Temp: 98.1  F (36.7  C)    TempSrc: Temporal    SpO2: 100% 100%   Weight: 50.8 kg (112 lb)        Cardiac Rhythm:     Was the PSS-3 completed:   Yes  What interventions are required if any?               Family Comments: concern for bleeding   OBS brochure/video discussed/provided to patient/family: No              Name of person given brochure if not patient:               Relationship to patient: sister     For the majority of the shift this patient's behavior was Green.   Behavioral interventions performed were none  .    ED NURSE PHONE NUMBER: ED RN

## 2023-12-11 NOTE — PROGRESS NOTES
Admitting hospitalist orders midline as, per H&P, patient with vascular access difficulties last hospitalization.  Per same note, objective is ease of lab draws.  Discuss with primary nursing, as midlines not always reliable source for lab draws, VAT would recommend PICC line.  Primary nursing to request hospitalist modify midline order to PICC order.  Primary nursing aware PICC cannot be placed until 12/11/23 secondary to VAT coverage ending, and to update hospitalist on same.

## 2023-12-11 NOTE — CONSULTS
[facility] Quick Note:   Full consult to follow 12/12/23.     Hg 6.0 from 10.5 1 month prior. Suspicious for acute blood loss anemia and less likely lymphoma related.   Will check iron studies, B12, folate, PBS, retic count, haptoglobin.   Pending endoscopy tomorrow to further evaluate.   For her lymphoma we are pending an outpatient biopsy of FDG avid subcutaneous nodule on the left back. Plan will likely be for surveillance for stage IV low grade lymphoma.     Patrick Dreyer, DO  [facility]

## 2023-12-11 NOTE — PROGRESS NOTES
[facility]    Medicine Progress Note - Hospitalist Service        Date of Admission:  12/10/2023  4:00 PM    Assessment & Plan:   63-year-old female who was recently hospitalized from 10/3-10/14/23 for acute occlusion of her RLE PTFE Graft S/p embolectomy and thromectomy by vascular surgery. Patient received 2 units of PRBCs intraoperatively.  Hemoglobin was 7.2 prior to procedure and during that hospitalization hemoglobins stable around 9 g/dL. She presented to the emergency room on 12/10/2023 with complaints of worsening right lower extremity pain and dyspnea on exertion and was found to be anemic with hemoglobin of 6. Her occult stool blood testing returned positive.  She was admitted for further evaluation of acute on chronic anemia.     Acute on chronic possible blood loss anemia while on anticoagulation with Plavix and Xarelto.  Rule out GI bleed  History of GERD  -Patient presents with a hemoglobin of 6.0, her previous hemoglobin on 11/13/2023 was 10.5  -Concern for acute blood loss anemia most likely from GI source although patient denies hematochezia or melena or bright red bleeding  -She received 2 unit of PRBC altogether, hemoglobin is now stable at 8.0  -Evaluated by [facility], plan for EGD and colonoscopy tomorrow  -Serial hemoglobin every 8 hours  -Hold Xarelto and Plavix, but continue heparin drip.  Please see discussion about anticoagulation below  -Continue omeprazole 40 mg twice a day  -Monitor for clinical evidence of GI bleed and transfuse as clinically indicated    S/p recent emergent right femoral to anterior tibial PTFE graft thrombectomy, anterior tibial thrombectomy intraoperative angiography by vascular surgery on 10/11/2023.  History of peripheral artery disease status post bilateral lower extremity bypass grafting.  History of bilateral carotid endarterectomy.  -As noted above patient underwent emergent graft thrombectomy during recent hospitalization and October    -Current ultrasound showed bilateral lower extremity bypass grafts are patent without any stenosis  -Vascular surgery consulted  -Prior to admission Xarelto and Plavix on hold due to concerns for bleeding however, patient was started on low intensity heparin drip due to her extensive history of peripheral arterial disease and recent thrombosis of the graft needing embolectomy and thrombectomy  -Monitor hemoglobin closely while on heparin drip.     History of Takotsubo cardiomyopathy with EF of 30 to 35%  -Patient had recent coronary angiogram on 10/4/2023 which showed no new lesions  -Continue PTA meds including Coreg, Norvasc, statin  -Hold lisinopril for now due to concern for bleeding, will resume as clinically indicated  -Continue prior to admission Jardiance     COPD  -PTA regimen includes Leelee-Ellipta. Well controlled.   -Continue PTA inhaler  -Respiratory status stable currently       Diet: Clear Liquid Diet     DVT Prophylaxis: Heparin drip  Alvarez Catheter: Not present  Code Status: Full Code     Disposition Plan       Expected Discharge Date: 12/12/2023              Entered: Vadim Ramirez MD 12/11/2023, 9:40 AM        Clinically Significant Risk Factors Present on Admission               # Drug Induced Coagulation Defect: home medication list includes an anticoagulant medication  # Drug Induced Platelet Defect: home medication list includes an antiplatelet medication   # Hypertension: Noted on problem list                      The patient's care was discussed with the Bedside Nurse and Patient.    Medical Decision Making       **CLEAR ALL SELECTIONS**      Labs/Imaging Reviewed:  See Information above and Data section below  Time SPENT BY ME on the date of service doing chart review, history, exam, documentation & further activities per the note:  50 MINUTES    Chart documentation was completed, in part, with [employer] voice-recognition software. Even though reviewed, some grammatical, spelling, and word  "errors may remain.    Vadim Ramirez MD  Hospitalist Service  [facility]  Text Page 7AM-6PM  Securely message with the Vocera Web Console (learn more here)  Text page via [employer] Paging/Directory    ______________________________________________________________________    Interval History   Hemoglobin stable at 8 after 2 units of PRBC.  Patient endorses mild dyspnea.  No reported hematochezia or melena.  No nausea or vomiting.  No abdominal pain.    Data reviewed today: I reviewed all medications, new labs and imaging results over the last 24 hours. I personally reviewed no images or EKG's today.    Physical Exam   Vital signs:  Temp: 97.3  F (36.3  C) Temp src: Oral BP: 134/66 Pulse: 54   Resp: 13 SpO2: 94 % O2 Device: None (Room air)     Weight: 50.8 kg (112 lb)  Estimated body mass index is 20.65 kg/m  as calculated from the following:    Height as of 11/21/23: 1.568 m (5' 1.75\").    Weight as of this encounter: 50.8 kg (112 lb).      Wt Readings from Last 2 Encounters:   12/10/23 50.8 kg (112 lb)   11/21/23 50.6 kg (111 lb 8 oz)       Gen: AAOX3, NAD, comfortable  HEENT: Pale conjunctiva  Resp: Diminished at the bases bilaterally, normal effort of breathing  CVS: RRR, no murmur  Abd/GI: Soft, non-tender. BS- normoactive.    Skin: Warm, dry no rashes  MSK: no pedal edema  Neuro- CN- intact. No focal deficits.      Data   Recent Labs   Lab 12/11/23  0550 12/10/23  2311 12/10/23  2202 12/10/23  1635   WBC 4.8  --   --  5.4   HGB 8.0* 6.6*  --  6.0*   MCV 86  --   --  89     --   --  259   *  --   --  131*   POTASSIUM 4.4  --   --  4.5   CHLORIDE 104  --   --  100   CO2 19*  --   --  20*   BUN 24.9*  --   --  23.4*   CR 0.87  --   --  0.79   ANIONGAP 10  --   --  11   SONIA 8.3*  --   --  8.9   GLC 86  --  119* 94       Recent Results (from the past 24 hour(s))   XR Chest 2 Views    Narrative    EXAM: XR CHEST 2 VIEWS  LOCATION: [facility]  DATE: " 12/10/2023    INDICATION: dyspnea  COMPARISON: Chest radiograph 12/09/2022, CT 10/30/2023      Impression    IMPRESSION: No evidence of acute cardiopulmonary disease. Cholecystectomy.   US Lower Extremity Arterial Duplex Bilateral    Narrative    EXAM: US LOWER EXTREMITY ARTERIAL DUPLEX BILATERAL  LOCATION: [facility]  DATE: 12/10/2023    INDICATION: R>L pain, chronic severe PAD  COMPARISON: Ultrasound 10/04/2023  TECHNIQUE: Duplex utilizing 2D gray-scale imaging, Doppler interrogation with color-flow and spectral waveform analysis.    FINDINGS:     RIGHT - Status post fmtvmmg-os-dhtlveule artery bypass graft with subsequent distal jump graft to the tibioperoneal trunk. The distinct grafts are not imaged separately but appear grossly patent. Waveforms are diffusely monophasic with some occasional   blunting. Anterior tibial and dorsalis pedis arteries are patent on today's exam, new in comparison to ultrasound on 10/04/2023.    LEFT - Status post vekajpf-bl-ttiajvmnx artery bypass graft appears which patent with increased velocities at both the proximal and distal anastomoses. The arteries at and below the knee are patent, although waveforms are largely monophasic. Dorsalis   pedis artery is noted to be biphasic.    RIGHT LOWER EXTREMITY ARTERIAL ASSESSMENT:  Common femoral artery: 63/14 cm/s  Proximal graft: 82/16 cm/s  Mid graft: 43/5 cm/s  Distal graft: 36/9 cm/s  Anterior tibial artery: 139/8 cm/s  Posterior tibial artery: Occluded.  Dorsalis pedis artery: 73/8 cm/s  Peroneal artery: 21 cm/s, although flow appears to be reversed    LEFT LOWER EXTREMITY ARTERIAL ASSESSMENT:  Common femoral artery: 46/7 cm/s  Proximal graft: 171/17 cm/s  Mid graft: 111/7 cm/s  Distal graft: 209/6 cm/s  SFA (distal): 143/0 cm/s  Popliteal artery (proximal): 165/0 cm/s  Popliteal artery (distal): 138/11 cm/s  Anterior tibial artery: 76/0 cm/s  Posterior tibial artery: 135/0 cm/s  Dorsalis pedis artery: 42/0  cm/s  Peroneal artery:  48/9 cm/s      Impression    IMPRESSION:  1.  Patent bilateral vascular arterial grafts as above without evidence of critical stenosis or occlusion.       Medications    heparin 750 Units/hr (12/11/23 0317)    - MEDICATION INSTRUCTIONS -        acetaminophen  1,000 mg Oral Q8H    amLODIPine  2.5 mg Oral Daily    carvedilol  6.25 mg Oral BID w/meals    famotidine  40 mg Intravenous Q12H    ferrous sulfate  325 mg Oral Every Other Day    fluticasone-vilanterol  1 puff Inhalation Daily    gabapentin  100 mg Oral TID    polyethylene glycol  238 g Oral Once    Followed by    [START ON 12/12/2023] magnesium citrate  296 mL Oral Once    nicotine  1 patch Transdermal Daily    nicotine   Transdermal Q8H    omeprazole  40 mg Oral BID AC    rosuvastatin  40 mg Oral At Bedtime    senna-docusate  1 tablet Oral BID    sodium chloride (PF)  3 mL Intracatheter Q8H    sodium chloride (PF)  3 mL Intracatheter Q8H

## 2023-12-11 NOTE — UTILIZATION REVIEW
Admission Status; Secondary Review Determination         Under the authority of the Utilization Management Committee, the utilization review process indicated a secondary review on the above patient.  The review outcome is based on review of the medical records, discussions with staff, and applying clinical experience noted on the date of the review.        (x)      Inpatient Status Appropriate - This patient's medical care is consistent with medical management for inpatient care and reasonable inpatient medical practice.     RATIONALE FOR DETERMINATION   The patient is a 63-year-old female recently hospitalized for acute occlusion of right lower extremity graft requiring embolectomy and thrombectomy by vascular surgery with admission from 10/3/2023 until 10/14/2023.  Patient came to the ED due to fatigue and inability to do her daily activities.  She was found to have a hemoglobin of 6.0 with previous hemoglobin on 11/13/2023 of 10.5.  She is anticoagulated with Xarelto and Plavix.  She received 2 units of packed red cells and was started on a heparin drip for bridging purposes.  Vascular surgery has already consulted and apparently graft is patent and bleeding may be from a another source requiring evaluation.  Given severity of bleed given drop from recent higher hemoglobin and complications due to high risk for emboli and graft if anticoagulation was stopped, recommend continuation of inpatient status.  She also has a history of Takotsubo cardiomyopathy with ejection fraction of 30 to 35%.      The severity of illness, intensity of service provided, expected LOS and risk for adverse outcome make the care complex, high risk and appropriate for hospital admission.        The information on this document is developed by the utilization review team in order for the business office to ensure compliance.  This only denotes the appropriateness of proper admission status and does not reflect the quality of care  rendered.         The definitions of Inpatient Status and Observation Status used in making the determination above are those provided in the CMS Coverage Manual, Chapter 1 and Chapter 6, section 70.4.      Sincerely,     Ace Casas MD  Physician Advisor  Utilization Review/ Case Management  [facility].

## 2023-12-12 ENCOUNTER — ANESTHESIA (OUTPATIENT)
Dept: GASTROENTEROLOGY | Facility: CLINIC | Age: 63
DRG: 812 | End: 2023-12-12
Payer: COMMERCIAL

## 2023-12-12 ENCOUNTER — ANESTHESIA EVENT (OUTPATIENT)
Dept: GASTROENTEROLOGY | Facility: CLINIC | Age: 63
DRG: 812 | End: 2023-12-12
Payer: COMMERCIAL

## 2023-12-12 LAB
APTT PPP: 27 SECONDS (ref 22–38)
APTT PPP: 44 SECONDS (ref 22–38)
APTT PPP: 77 SECONDS (ref 22–38)
BASOPHILS # BLD AUTO: 0.1 10E3/UL (ref 0–0.2)
BASOPHILS NFR BLD AUTO: 1 %
COLONOSCOPY: NORMAL
EOSINOPHIL # BLD AUTO: 0.3 10E3/UL (ref 0–0.7)
EOSINOPHIL NFR BLD AUTO: 6 %
ERYTHROCYTE [DISTWIDTH] IN BLOOD BY AUTOMATED COUNT: 15.1 % (ref 10–15)
FERRITIN SERPL-MCNC: 23 NG/ML (ref 11–328)
FOLATE SERPL-MCNC: 12.9 NG/ML (ref 4.6–34.8)
HAPTOGLOB SERPL-MCNC: 104 MG/DL (ref 30–200)
HCT VFR BLD AUTO: 25.6 % (ref 35–47)
HGB BLD-MCNC: 7.5 G/DL (ref 11.7–15.7)
HGB BLD-MCNC: 8.4 G/DL (ref 11.7–15.7)
HGB BLD-MCNC: 8.5 G/DL (ref 11.7–15.7)
IMM GRANULOCYTES # BLD: 0 10E3/UL
IMM GRANULOCYTES NFR BLD: 0 %
LDH SERPL L TO P-CCNC: 165 U/L (ref 0–250)
LYMPHOCYTES # BLD AUTO: 1.1 10E3/UL (ref 0.8–5.3)
LYMPHOCYTES NFR BLD AUTO: 23 %
MCH RBC QN AUTO: 28.7 PG (ref 26.5–33)
MCHC RBC AUTO-ENTMCNC: 32.8 G/DL (ref 31.5–36.5)
MCV RBC AUTO: 87 FL (ref 78–100)
MONOCYTES # BLD AUTO: 0.4 10E3/UL (ref 0–1.3)
MONOCYTES NFR BLD AUTO: 9 %
NEUTROPHILS # BLD AUTO: 2.9 10E3/UL (ref 1.6–8.3)
NEUTROPHILS NFR BLD AUTO: 61 %
NRBC # BLD AUTO: 0 10E3/UL
NRBC BLD AUTO-RTO: 0 /100
PATH REPORT.COMMENTS IMP SPEC: NORMAL
PATH REPORT.COMMENTS IMP SPEC: NORMAL
PATH REPORT.FINAL DX SPEC: NORMAL
PATH REPORT.MICROSCOPIC SPEC OTHER STN: NORMAL
PATH REPORT.MICROSCOPIC SPEC OTHER STN: NORMAL
PLATELET # BLD AUTO: 245 10E3/UL (ref 150–450)
RBC # BLD AUTO: 2.93 10E6/UL (ref 3.8–5.2)
RETICS # AUTO: 0.07 10E6/UL (ref 0.03–0.1)
RETICS/RBC NFR AUTO: 2.4 % (ref 0.5–2)
UPPER GI ENDOSCOPY: NORMAL
VIT B12 SERPL-MCNC: 484 PG/ML (ref 232–1245)
WBC # BLD AUTO: 4.8 10E3/UL (ref 4–11)

## 2023-12-12 PROCEDURE — 250N000013 HC RX MED GY IP 250 OP 250 PS 637: Performed by: INTERNAL MEDICINE

## 2023-12-12 PROCEDURE — 120N000013 HC R&B IMCU

## 2023-12-12 PROCEDURE — 82746 ASSAY OF FOLIC ACID SERUM: CPT | Performed by: INTERNAL MEDICINE

## 2023-12-12 PROCEDURE — 43235 EGD DIAGNOSTIC BRUSH WASH: CPT | Performed by: INTERNAL MEDICINE

## 2023-12-12 PROCEDURE — 85025 COMPLETE CBC W/AUTO DIFF WBC: CPT | Performed by: INTERNAL MEDICINE

## 2023-12-12 PROCEDURE — 370N000017 HC ANESTHESIA TECHNICAL FEE, PER MIN: Performed by: INTERNAL MEDICINE

## 2023-12-12 PROCEDURE — 99231 SBSQ HOSP IP/OBS SF/LOW 25: CPT | Mod: 24 | Performed by: SURGERY

## 2023-12-12 PROCEDURE — 258N000003 HC RX IP 258 OP 636: Performed by: NURSE ANESTHETIST, CERTIFIED REGISTERED

## 2023-12-12 PROCEDURE — 250N000011 HC RX IP 250 OP 636: Performed by: NURSE ANESTHETIST, CERTIFIED REGISTERED

## 2023-12-12 PROCEDURE — 250N000011 HC RX IP 250 OP 636: Mod: JZ | Performed by: INTERNAL MEDICINE

## 2023-12-12 PROCEDURE — 45378 DIAGNOSTIC COLONOSCOPY: CPT | Performed by: INTERNAL MEDICINE

## 2023-12-12 PROCEDURE — 85045 AUTOMATED RETICULOCYTE COUNT: CPT | Performed by: INTERNAL MEDICINE

## 2023-12-12 PROCEDURE — 250N000011 HC RX IP 250 OP 636: Performed by: INTERNAL MEDICINE

## 2023-12-12 PROCEDURE — 250N000013 HC RX MED GY IP 250 OP 250 PS 637: Performed by: PHYSICIAN ASSISTANT

## 2023-12-12 PROCEDURE — 85018 HEMOGLOBIN: CPT | Performed by: INTERNAL MEDICINE

## 2023-12-12 PROCEDURE — 999N000010 HC STATISTIC ANES STAT CODE-CRNA PER MINUTE: Performed by: INTERNAL MEDICINE

## 2023-12-12 PROCEDURE — 85730 THROMBOPLASTIN TIME PARTIAL: CPT | Performed by: INTERNAL MEDICINE

## 2023-12-12 PROCEDURE — 83615 LACTATE (LD) (LDH) ENZYME: CPT | Performed by: INTERNAL MEDICINE

## 2023-12-12 PROCEDURE — 0DJ08ZZ INSPECTION OF UPPER INTESTINAL TRACT, VIA NATURAL OR ARTIFICIAL OPENING ENDOSCOPIC: ICD-10-PCS | Performed by: INTERNAL MEDICINE

## 2023-12-12 PROCEDURE — 250N000009 HC RX 250: Performed by: NURSE ANESTHETIST, CERTIFIED REGISTERED

## 2023-12-12 PROCEDURE — 0DJD8ZZ INSPECTION OF LOWER INTESTINAL TRACT, VIA NATURAL OR ARTIFICIAL OPENING ENDOSCOPIC: ICD-10-PCS | Performed by: INTERNAL MEDICINE

## 2023-12-12 PROCEDURE — 85060 BLOOD SMEAR INTERPRETATION: CPT | Performed by: PATHOLOGY

## 2023-12-12 PROCEDURE — 99232 SBSQ HOSP IP/OBS MODERATE 35: CPT | Performed by: INTERNAL MEDICINE

## 2023-12-12 RX ORDER — LISINOPRIL 10 MG/1
10 TABLET ORAL DAILY
Status: DISCONTINUED | OUTPATIENT
Start: 2023-12-12 | End: 2023-12-15 | Stop reason: HOSPADM

## 2023-12-12 RX ORDER — LIDOCAINE HYDROCHLORIDE 20 MG/ML
INJECTION, SOLUTION INFILTRATION; PERINEURAL PRN
Status: DISCONTINUED | OUTPATIENT
Start: 2023-12-12 | End: 2023-12-12

## 2023-12-12 RX ORDER — MAGNESIUM CARB/ALUMINUM HYDROX 105-160MG
296 TABLET,CHEWABLE ORAL ONCE
Status: DISCONTINUED | OUTPATIENT
Start: 2023-12-13 | End: 2023-12-12

## 2023-12-12 RX ORDER — PROPOFOL 10 MG/ML
INJECTION, EMULSION INTRAVENOUS CONTINUOUS PRN
Status: DISCONTINUED | OUTPATIENT
Start: 2023-12-12 | End: 2023-12-12

## 2023-12-12 RX ORDER — ONDANSETRON 2 MG/ML
4 INJECTION INTRAMUSCULAR; INTRAVENOUS
Status: CANCELLED | OUTPATIENT
Start: 2023-12-12

## 2023-12-12 RX ORDER — LIDOCAINE 40 MG/G
CREAM TOPICAL
Status: CANCELLED | OUTPATIENT
Start: 2023-12-12

## 2023-12-12 RX ORDER — ONDANSETRON 2 MG/ML
INJECTION INTRAMUSCULAR; INTRAVENOUS PRN
Status: DISCONTINUED | OUTPATIENT
Start: 2023-12-12 | End: 2023-12-12

## 2023-12-12 RX ORDER — FLUMAZENIL 0.1 MG/ML
0.2 INJECTION, SOLUTION INTRAVENOUS
Status: CANCELLED | OUTPATIENT
Start: 2023-12-12 | End: 2023-12-13

## 2023-12-12 RX ORDER — SODIUM CHLORIDE, SODIUM LACTATE, POTASSIUM CHLORIDE, CALCIUM CHLORIDE 600; 310; 30; 20 MG/100ML; MG/100ML; MG/100ML; MG/100ML
INJECTION, SOLUTION INTRAVENOUS CONTINUOUS PRN
Status: DISCONTINUED | OUTPATIENT
Start: 2023-12-12 | End: 2023-12-12

## 2023-12-12 RX ADMIN — ACETAMINOPHEN 1000 MG: 500 TABLET, FILM COATED ORAL at 14:15

## 2023-12-12 RX ADMIN — PROPOFOL 200 MCG/KG/MIN: 10 INJECTION, EMULSION INTRAVENOUS at 12:11

## 2023-12-12 RX ADMIN — ONDANSETRON 4 MG: 2 INJECTION INTRAMUSCULAR; INTRAVENOUS at 12:14

## 2023-12-12 RX ADMIN — SODIUM CHLORIDE, POTASSIUM CHLORIDE, SODIUM LACTATE AND CALCIUM CHLORIDE: 600; 310; 30; 20 INJECTION, SOLUTION INTRAVENOUS at 12:11

## 2023-12-12 RX ADMIN — GABAPENTIN 100 MG: 100 CAPSULE ORAL at 16:37

## 2023-12-12 RX ADMIN — FAMOTIDINE 20 MG: 10 INJECTION INTRAVENOUS at 09:05

## 2023-12-12 RX ADMIN — ACETAMINOPHEN 1000 MG: 500 TABLET, FILM COATED ORAL at 05:21

## 2023-12-12 RX ADMIN — AMLODIPINE BESYLATE 2.5 MG: 2.5 TABLET ORAL at 09:06

## 2023-12-12 RX ADMIN — CARVEDILOL 6.25 MG: 6.25 TABLET, FILM COATED ORAL at 17:43

## 2023-12-12 RX ADMIN — TOPICAL ANESTHETIC 1 SPRAY: 200 SPRAY DENTAL; PERIODONTAL at 12:11

## 2023-12-12 RX ADMIN — LIDOCAINE HYDROCHLORIDE 40 MG: 20 INJECTION, SOLUTION INFILTRATION; PERINEURAL at 12:11

## 2023-12-12 RX ADMIN — OMEPRAZOLE 40 MG: 20 CAPSULE, DELAYED RELEASE ORAL at 09:05

## 2023-12-12 RX ADMIN — BE HEALTH MAGNESIUM CITRATE ORAL SOLUTION - CHERRY 296 ML: 1.75 LIQUID ORAL at 05:21

## 2023-12-12 RX ADMIN — ACETAMINOPHEN 1000 MG: 500 TABLET, FILM COATED ORAL at 21:11

## 2023-12-12 RX ADMIN — DOCUSATE SODIUM 50 MG AND SENNOSIDES 8.6 MG 1 TABLET: 8.6; 5 TABLET, FILM COATED ORAL at 09:06

## 2023-12-12 RX ADMIN — OMEPRAZOLE 40 MG: 20 CAPSULE, DELAYED RELEASE ORAL at 16:37

## 2023-12-12 RX ADMIN — NICOTINE 1 PATCH: 14 PATCH, EXTENDED RELEASE TRANSDERMAL at 18:34

## 2023-12-12 RX ADMIN — POLYETHYLENE GLYCOL 3350, SODIUM SULFATE ANHYDROUS, SODIUM BICARBONATE, SODIUM CHLORIDE, POTASSIUM CHLORIDE 4000 ML: 236; 22.74; 6.74; 5.86; 2.97 POWDER, FOR SOLUTION ORAL at 17:43

## 2023-12-12 RX ADMIN — DOCUSATE SODIUM 50 MG AND SENNOSIDES 8.6 MG 1 TABLET: 8.6; 5 TABLET, FILM COATED ORAL at 21:11

## 2023-12-12 RX ADMIN — LISINOPRIL 10 MG: 10 TABLET ORAL at 14:47

## 2023-12-12 RX ADMIN — ROSUVASTATIN CALCIUM 40 MG: 20 TABLET, FILM COATED ORAL at 21:11

## 2023-12-12 RX ADMIN — ONDANSETRON 4 MG: 2 INJECTION INTRAMUSCULAR; INTRAVENOUS at 05:13

## 2023-12-12 RX ADMIN — EMPAGLIFLOZIN 10 MG: 10 TABLET, FILM COATED ORAL at 09:06

## 2023-12-12 RX ADMIN — HEPARIN SODIUM 550 UNITS/HR: 10000 INJECTION, SOLUTION INTRAVENOUS at 18:34

## 2023-12-12 RX ADMIN — GABAPENTIN 100 MG: 100 CAPSULE ORAL at 09:06

## 2023-12-12 RX ADMIN — FLUTICASONE FUROATE AND VILANTEROL TRIFENATATE 1 PUFF: 200; 25 POWDER RESPIRATORY (INHALATION) at 09:19

## 2023-12-12 RX ADMIN — GABAPENTIN 100 MG: 100 CAPSULE ORAL at 21:11

## 2023-12-12 ASSESSMENT — COPD QUESTIONNAIRES: COPD: 1

## 2023-12-12 ASSESSMENT — ACTIVITIES OF DAILY LIVING (ADL)
ADLS_ACUITY_SCORE: 24
ADLS_ACUITY_SCORE: 25
ADLS_ACUITY_SCORE: 24
ADLS_ACUITY_SCORE: 25

## 2023-12-12 ASSESSMENT — LIFESTYLE VARIABLES: TOBACCO_USE: 1

## 2023-12-12 ASSESSMENT — ENCOUNTER SYMPTOMS: DYSRHYTHMIAS: 1

## 2023-12-12 NOTE — PLAN OF CARE
Shift Summary ([id number]):     A&Ox4. VSS on room air. Tele shows SR/SB with freq PACs. PRN oxy and scheduled tylenol for c/o shoulder pain. Up SBA to bedside commode. -BM. R PICC line infusing heparin @ 900 units/hr. BLE pulses detectable with doppler. LS diminished.Clear liquid diet. Hgb stable at this time. Plan for EGD and colonoscopy tomorrow. Bowel prep started this evening. NPO at midnight.

## 2023-12-12 NOTE — ANESTHESIA CARE TRANSFER NOTE
Patient: Shirley Hendricks    Procedure: Procedure(s):  Esophagoscopy, gastroscopy, duodenoscopy (EGD), combined  Colonoscopy       Diagnosis: Anemia [D64.9]  Diagnosis Additional Information: No value filed.    Anesthesia Type:   MAC     Note:    Oropharynx: oropharynx clear of all foreign objects and spontaneously breathing  Level of Consciousness: awake and drowsy  Oxygen Supplementation: room air    Independent Airway: airway patency satisfactory and stable  Dentition: dentition unchanged  Vital Signs Stable: post-procedure vital signs reviewed and stable  Report to RN Given: handoff report given  Patient transferred to: Phase II (endo phase II)    Handoff Report: Identifed the Patient, Identified the Reponsible Provider, Reviewed the pertinent medical history, Discussed the surgical course, Reviewed Intra-OP anesthesia mangement and issues during anesthesia, Set expectations for post-procedure period and Allowed opportunity for questions and acknowledgement of understanding      Vitals:  Vitals Value Taken Time   BP     Temp     Pulse 89 12/12/23 1239   Resp 40 12/12/23 1239   SpO2 99 % 12/12/23 1239   Vitals shown include unfiled device data.    Electronically Signed By: NOELLE Sullivan CRNA  December 12, 2023  12:41 PM

## 2023-12-12 NOTE — PLAN OF CARE
Pt is A&Ox4, SBA, clear liquid diet. R PICC in place, heparin gtts restarted at 550units/hr. Doppler pulses BLE. Went for EGD and colonoscopy today, was not able to complete colonoscopy d/t poor bowel prep overnight- another round of bowel prep to start later today and plan for procedure at 0945 tomorrow. Nicotine patch removed before procedure and new one was not placed post procedure.

## 2023-12-12 NOTE — PROGRESS NOTES
Oncology Progress Note  [facility]  Date of Service : 12/12/2023    Primary Oncologist: Dreyer        Assessment and Plan:     1. 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 Mendoza -> needle biopsy 9/22/23 revealed atypical lymphoid population, immunophenotyping is compatible with malignant B-cell lymphoma.  - LDH WNL  - 10/11/23 excisional node biopsy revealed low grade B cell lymphoma with plasmacytic differentiation, consistent with extranodal marginal zone lymphoma   - 10/27/23 EGD negative   - 10/30/23 PET-CT FDG avid right retropharyngeal and right level 2 cervical lymph node consistent with known lymphoma. There is also FDG avid disease involving the right parotid gland which appears to extend into the right masseter muscle. 2. FDG avid subcutaneous/intramuscular nodule of the left back is favored an additional site of lymphomatous involvement given interval increase in size     2. Anemia  Hg 6.0 from 10.5 1 month prior. Suspicious for acute blood loss anemia and less likely lymphoma related.   - Retic 1.9%, iron saturation 32%,   Will check iron studies, B12, folate, PBS, haptoglobin.   Pending endoscopy today to further evaluate.    3. Pruritus  - possibly 2/2 lymphoma  - pending derm evaluation     PLAN  - Outpatient bone marrow scheduled for 1 week from today   - F/U GI work up   - As stated above, given acuity of hemoglobin drop, this is more suspicious for blood loss anemia rather than bone marrow involvement of lymphoma   - Pending anemia work up, PBS   - Will reschedule her Derm appointment     Patrick Dreyer, DO  [facility]   Office: [phone]         Interval History:   Continues with very itchy back. Prep was poor this morning and plan is for colonoscopy again tomorrow.          Physical Exam:     Vitals:    12/10/23 1557   Weight: 50.8  kg (112 lb)     Vital Signs with Ranges  Temp:  [97.5  F (36.4  C)-98.3  F (36.8  C)] 97.5  F (36.4  C)  Pulse:  [55-73] 61  Resp:  [8-16] 16  BP: ()/(57-92) 157/72  SpO2:  [92 %-99 %] 96 %  General: No acute distress  HEENT: Membranes moist, no nasal discharge, no scleral icterus, atraumatic/normocephalic  Lungs: No audible wheezing, respirations unlabored, no cough  Cardiovascular: No evidence of pitting edema bilaterally  Abdomen: No evidence of abdominal distention  Extremities: No cyanosis or clubbing  Skin: Normal skin color, no rashes seen, no jaundice  Neurologic: Moving all extremities, no tremors, no evidence of focal deficits  Psych: Alert and oriented x3, calm    Data   Results for orders placed or performed during the hospital encounter of 12/10/23 (from the past 24 hour(s))   Partial thromboplastin time   Result Value Ref Range    aPTT 49 (H) 22 - 38 Seconds   Hemoglobin   Result Value Ref Range    Hemoglobin 8.9 (L) 11.7 - 15.7 g/dL   Reticulocyte count   Result Value Ref Range    % Reticulocyte 1.9 0.5 - 2.0 %    Absolute Reticulocyte 0.054 0.025 - 0.095 10e6/uL   Double Lumen PICC Placement    Narrative    Devyn Meza RN     12/11/2023  2:58 PM  [facility]    Double Lumen PICC Placement    Date/Time: 12/11/2023 2:55 PM    Performed by: Devyn Meza RN  Authorized by: Vadim Ramirez MD  Indications: vascular access      UNIVERSAL PROTOCOL   Site Marked: Yes  Prior Images Obtained and Reviewed:  Yes  Required items: Required blood products, implants, devices and special   equipment available    Patient identity confirmed:  Verbally with patient, arm band and   hospital-assigned identification number  NA - No sedation, light sedation, or local anesthesia  Confirmation Checklist:  Patient's identity using two indicators, relevant   allergies, procedure was appropriate and matched the consent or emergent   situation and correct equipment/implants were  available  Time out: Immediately prior to the procedure a time out was called    Universal Protocol: the Joint Commission Universal Protocol was followed    Preparation: Patient was prepped and draped in usual sterile fashion       ANESTHESIA    Local Anesthetic:  Lidocaine 1% without epinephrine  Anesthetic Total (mL):  1      SEDATION    Patient Sedated: No        Skin prep agent: skin prep agent completely dried prior to procedure  Sterile barriers: maximum sterile barriers were used: cap, mask, sterile   gown, sterile gloves, and large sterile sheet  Hand hygiene: hand hygiene performed prior to central venous catheter   insertion  Type of line used: PICC  Catheter type: double lumen  Lumen type: valved and power PICC  Lumen Identification: Purple and Red  Catheter size: 5 Fr  Brand: Bard  Lot number: [id number]  Placement method: MST, venipuncture and ultrasound  Number of attempts: 1  Difficulty threading catheter: no  Successful placement: yes  Orientation: right    Location: brachial vein (medial)  Tip Location: SVC  Arm circumference: adults 10 cm  Extremity circumference: 25  Visible catheter length: 2  Total catheter length: 38  Internal Lumen Volume: 36 mL    Dressing and securement: chlorhexidine patch applied, site cleansed,   subcutaneous anchor securement system, transparent securement dressing,   transparent dressing, securement device and sterile dressing applied  Post procedure assessment: blood return through all ports  PROCEDURE   Patient Tolerance:  Patient tolerated the procedure well with no immediate   complicationsDescribe Procedure: Nursing note  Successfully placed double lumen PICC on the right brachial vein on one   attempt with good blood return noted.  Disposal: sharps and needle count correct at the end of procedure, needles   and guidewire disposed in sharps container   Partial thromboplastin time   Result Value Ref Range    aPTT 146 (HH) 22 - 38 Seconds   Hemoglobin   Result Value  Ref Range    Hemoglobin 8.6 (L) 11.7 - 15.7 g/dL   Partial thromboplastin time   Result Value Ref Range    aPTT 44 (H) 22 - 38 Seconds   Lab Blood Morphology Pathologist Review    Narrative    The following orders were created for panel order Lab Blood Morphology Pathologist Review.  Procedure                               Abnormality         Status                     ---------                               -----------         ------                     Bld morphology pathology...[[id number]]                      In process                 CBC with platelets and d...[[id number]]  Abnormal            Final result               Reticulocyte count[[id number]]           Abnormal            Final result               Morphology Tracking[[id number]]                              Final result                 Please view results for these tests on the individual orders.   Lactate Dehydrogenase   Result Value Ref Range    Lactate Dehydrogenase 165 0 - 250 U/L   CBC with platelets and differential   Result Value Ref Range    WBC Count 4.8 4.0 - 11.0 10e3/uL    RBC Count 2.93 (L) 3.80 - 5.20 10e6/uL    Hemoglobin 8.4 (L) 11.7 - 15.7 g/dL    Hematocrit 25.6 (L) 35.0 - 47.0 %    MCV 87 78 - 100 fL    MCH 28.7 26.5 - 33.0 pg    MCHC 32.8 31.5 - 36.5 g/dL    RDW 15.1 (H) 10.0 - 15.0 %    Platelet Count 245 150 - 450 10e3/uL    % Neutrophils 61 %    % Lymphocytes 23 %    % Monocytes 9 %    % Eosinophils 6 %    % Basophils 1 %    % Immature Granulocytes 0 %    NRBCs per 100 WBC 0 <1 /100    Absolute Neutrophils 2.9 1.6 - 8.3 10e3/uL    Absolute Lymphocytes 1.1 0.8 - 5.3 10e3/uL    Absolute Monocytes 0.4 0.0 - 1.3 10e3/uL    Absolute Eosinophils 0.3 0.0 - 0.7 10e3/uL    Absolute Basophils 0.1 0.0 - 0.2 10e3/uL    Absolute Immature Granulocytes 0.0 <=0.4 10e3/uL    Absolute NRBCs 0.0 10e3/uL   Reticulocyte count   Result Value Ref Range    % Reticulocyte 2.4 (H) 0.5 - 2.0 %    Absolute Reticulocyte 0.071 0.025 - 0.095 10e6/uL   Partial  thromboplastin time   Result Value Ref Range    aPTT 27 22 - 38 Seconds     *Note: Due to a large number of results and/or encounters for the requested time period, some results have not been displayed. A complete set of results can be found in Results Review.       I spent a total of 40  minutes reviewing the patient chart, spending time face to face with the patient, documenting the encounter and placing orders.

## 2023-12-12 NOTE — PROGRESS NOTES
VASCULAR SURGERY     Slept well.  No issues with the right leg.  No obvious ongoing bleeding.    +3 femoral pulses.  Excellent Doppler and right distal AT/DP    Hgb= 8.4    IMPRESSION: Heparin on hold for EGD/colonoscopy today to locate source of bleeding.  Restart heparin as soon as feasible postprocedure due to vascular issues.  Discussed with patient.     Chadwick Lozano MD

## 2023-12-12 NOTE — PROVIDER NOTIFICATION
/76 mmHg, HR 55 bpm. Pr reported headache. No prn available. Paged Dr. Dio Fletcher if ok to order prn. Received order: hydralazine 10 mg IV prn for SBP >170 every 4 hours.

## 2023-12-12 NOTE — ANESTHESIA POSTPROCEDURE EVALUATION
Patient: Shirley Hendricks    Procedure: Procedure(s):  Esophagoscopy, gastroscopy, duodenoscopy (EGD), combined  Colonoscopy       Anesthesia Type:  MAC    Note:     Postop Pain Control: Uneventful            Sign Out: Well controlled pain   PONV: No   Neuro/Psych: Uneventful            Sign Out: Acceptable/Baseline neuro status   Airway/Respiratory: Uneventful            Sign Out: Acceptable/Baseline resp. status   CV/Hemodynamics: Uneventful            Sign Out: Acceptable CV status; No obvious hypovolemia; No obvious fluid overload   Other NRE: NONE   DID A NON-ROUTINE EVENT OCCUR? No           Last vitals:  Vitals Value Taken Time   /63 12/12/23 1242   Temp     Pulse 69 12/12/23 1247   Resp 41 12/12/23 1247   SpO2 97 % 12/12/23 1247   Vitals shown include unfiled device data.    Electronically Signed By: William Butler MD  December 12, 2023  12:48 PM

## 2023-12-12 NOTE — ANESTHESIA PREPROCEDURE EVALUATION
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 clot    Post-menopausal     Reflux esophagitis 2004    EGD: esophagitis and medium HH    SBO (small bowel obstruction) (H) 08/10/2021    SVT (supraventricular tachycardia)     Thrombocytopenia (H24)     Uncomplicated asthma     Vitamin C deficiency 2018      Past Surgical History:   Procedure Laterality Date    ANGIOGRAM      ANGIOGRAM Right 2021    Procedure: RIGHT LOWER EXTREMITY ANGIOGRAM WITH LEFT BRACHIAL ARTERY CUTDOWN;  Surgeon: José Luis Hernandez MD;  Location:  OR    BIOPSY MASS NECK Right 10/11/2023    Procedure: Right Parotid Biopsy;  Surgeon: Randal Mendoza MD;  Location:  OR    BYPASS GRAFT FEMOROPOPLITEAL Right 2020    Procedure: RIGHT FEMORAL GRAFT TO ABOVE-KNEE POPLITEAL BYPASS USING CADAVERIC SUPERFICIAL FEMORAL ARTERY;  Surgeon: Chadwick Lozano MD;  Location:  OR    BYPASS GRAFT FEMOROPOPLITEAL Right 2/15/2022    Procedure: RIGHT  SUPERFICIAL FEMORAL ARTERY GRAFT TO BELOW KNEE POPLITEAL BYPASS WITH CADAVERIC CRYOLIFE  FEMORAL-POPLITEAL ARTERY SIZE: OUTER DIAMETER: 7MM - 6MM;  Surgeon: Chadwick Lozano;  Location:  OR    BYPASS GRAFT FEMOROPOPLITEAL Right 5/26/2023    Procedure: RIGHT DISTAL SUPERFICIAL FEMORAL GRAFT TO ANTERIOR TIBIAL ARTERY WITH 26 CENTIMETER CADAVERIC SUPERFICIAL FEMORAL ARTERY GRAFT;  Surgeon: Chadwick Lozano MD;  Location:  OR    BYPASS GRAFT FEMOROPOPLITEAL Right 10/11/2023    Procedure: RIGHT FEMORAL TO POPLITEAL GRAFT THROMBECTOMY;  Surgeon: Chadwick Lozano MD;  Location:  OR    BYPASS GRAFT INSITU FEMOROPOPLITEAL Right 7/7/2021    Procedure: CREATION RIGHT FEMORAL TO POPLITEAL ARTERIAL BYPASS USING INSITU VEIN GRAFT;  Surgeon: José Luis Hernandez MD;  Location:  OR    CARDIAC CATHERIZATION  09/03/2009    multivessel CAD without target lesions, med mgmt indicated, preserved ef    CARPAL TUNNEL RELEASE RT/LT Right 05/20/2021    CV CORONARY ANGIOGRAM N/A 10/4/2023    Procedure: Coronary Angiogram;  Surgeon: Rogelio Ricks MD;  Location:  HEART CARDIAC CATH LAB    CV PCI N/A 10/4/2023    Procedure: Percutaneous Coronary Intervention;  Surgeon: Rogelio Ricks MD;  Location:  HEART CARDIAC CATH LAB    EMBOLECTOMY LOWER EXTREMITY Right 10/6/2023    Procedure: Right anterior tibial bypass with graft, Right tibial endarterectomy,thrombectomy, Right doraslis pedis thrombectomy, Anterior Tibial vein patch.;  Surgeon: Chadwick Lozano MD;  Location:  OR    ENDARTERECTOMY CAROTID Right 05/11/2016    Procedure: ENDARTERECTOMY CAROTID;  Surgeon: Chadwick Lozano MD;  Location:  OR    ENDARTERECTOMY CAROTID Left 06/08/2020    Procedure: LEFT CAROTID ENDARTERECTOMY with distal facal vein patch  and EEG;  Surgeon: Chadwick Lozano MD;  Location:  OR    FINE NEEDLE ASPIRATION WITHOUT IMAGING GUIDANCE Right 9/22/2023    Procedure: Fine needle aspiration  without imaging guidance;  Surgeon: Kiersten Aguilera MD;  Location:  GI    FLUORESCENCE INTRAOPERATIVE VASCULAR ANGIOGRAPHY Right 12/28/2022    Procedure: RIGHT LEG ANGIOGRAM with intervention;  Surgeon: Chadwick Lozano MD;  Location:  OR    GYN SURGERY  left tube    left salpingectomy    IR ANGIOGRAM THROUGH CATHETER (ARTERIAL)  10/6/2023    IR ANGIOGRAM THROUGH CATHETER (ARTERIAL)  10/6/2023    IR LOWER EXTREMITY ANGIOGRAM RIGHT  05/25/2021    IR LOWER EXTREMITY ANGIOGRAM RIGHT  10/5/2023    IR OR ANGIOGRAM  6/23/2021    IR OR ANGIOGRAM  12/28/2022    LAPAROSCOPIC CHOLECYSTECTOMY N/A 09/27/2017    Procedure: LAPAROSCOPIC CHOLECYSTECTOMY;  LAPAROSCOPIC CHOLECYSTECTOMY;  Surgeon: Jacoby Tapia MD;  Location: [facility]    LAPAROSCOPY DIAGNOSTIC (GENERAL) N/A 8/11/2021    Procedure: Exploratory laparoscopy,  laparoscopic lysis of adhesions, laparotomy;  Surgeon: Corina Ferreira MD;  Location:  OR    OR ANGIOGRAM, LOWER EXTREMITY Right 10/11/2023    Procedure: Or Angiogram, Lower Extremity;  Surgeon: Chadwick Lozano MD;  Location:  OR    ORTHOPEDIC SURGERY      left knee surgery    REPAIR ANEURYSM FEMORAL ARTERY Left 12/28/2022    Procedure: REPAIR LEFT FEMORAL PSEUDOANEURYSM;  Surgeon: Chadwick Lozano MD;  Location:  OR    VASCULAR SURGERY  aoto bi fem  left fem-AK pop    [facility] FABRIC WRAPPING OF ABDOMINAL ANEURYSM      [facility] NONSPECIFIC PROCEDURE  12/2000    angioplasty with stent right fem. a.    [facility] NONSPECIFIC PROCEDURE  1987    sinus surgery    [facility] NONSPECIFIC PROCEDURE  09/02/2009    Emergent left groin exploration with oversewing of bleeding angiographic site. 2. Endarterectomy of common femoral-proximal superficial femoral artery with greater saphenous vein patch angioplasty.   a. Van Dyne of accessory right greater saphenous vein.     [facility] NONSPECIFIC PROCEDURE  08/27/2009    occluded left common iliac and external iliac arteries were successfully revascularized with  stenting to 8 and 7 mm       Allergies   Allergen Reactions    Contrast Dye Anaphylaxis     RASH, FACIAL AND NECK SWELLING, SOB, WHEEZING    Pantoprazole      Protonix caused diffuse edema    Chantix [Varenicline]      Terrible dreams    Penicillins Itching and Rash      Social History     Tobacco Use    Smoking status: Some Days     Packs/day: 0.50     Years: 52.00     Additional pack years: 0.00     Total pack years: 26.00     Types: Cigarettes     Start date: 1968    Smokeless tobacco: Never    Tobacco comments:     1/2 PPD   Substance Use Topics    Alcohol use: Not Currently     Comment: x1-2 yr      Wt Readings from Last 1 Encounters:   12/10/23 50.8 kg (112 lb)        Anesthesia Evaluation   Pt has had prior anesthetic. Type: General.    No history of anesthetic complications       ROS/MED HX  ENT/Pulmonary:     (+)           allergic rhinitis,     tobacco use, Current use,    asthma    COPD,           (-) sleep apnea   Neurologic:    (-) no CVA and no TIA   Cardiovascular: Comment: svt    (+) Dyslipidemia hypertension- Peripheral Vascular Disease-  CAD - past MI - -   Taking blood thinners   CHF  Last EF: 30-35                dysrhythmias, Other,        Previous cardiac testing   Echo: Date: 10/3/23 Results:  Complete Portable Echo Adult. Optison (NDC #6543-6940) given intravenously.  Technically difficult study.  ______________________________________________________________________________  Interpretation Summary     Technically difficult study with some improvement after contrast use.     Normal LV size and wall thickness with severely reduced LV systolic function.  Visually estimated LVEF is around 30 to 35%.  Mid to distal portions of the left ventricle are completely akinetic to  severely hypokinetic. Basal segment contractility seems preserved. Findings  likely suggest stress-induced cardiomyopathy.  Normal RV size and systolic function.  No hemodynamically significant valve disease.  IVC is normal in  size and collapsible.    Stress Test:  Date:  Results:  Name: KASIA WAN  MRN: [id number]  : 1960  Study Date: 2021 12:57 PM  Age: 60 yrs  Gender: Female  Patient Location: [facility]  Reason For Study: Light headedness  Ordering Physician: JACKIE DOWNEY  Referring Physician: Vasquez Benoit  Performed By: Corey James RDCS     BSA: 1.5 m2  Height: 62 in  Weight: 112 lb  HR: 57  BP: 140/78 mmHg  ______________________________________________________________________________  Procedure  Stress Echo Complete.     ______________________________________________________________________________  Interpretation Summary     Suboptimal stress test due to inadequate maximum heart rate.  Normal left ventricular function and wall motion at rest and post-stress but  LV size and function were unchanged following limited exercise of 3 minutes.  Post-exercise images were obtained with the heart rate in the 70's bpm.  Blood pressure seth appropriately from 140/78 mmHg (supine, baseline) to  158/80 mmHg during Stage I and fell to 134/74 mmHg in recovery. Consider  Lexiscan stress imaging (nuclear, MRI) for adequate testing.  ______________________________________________________________________________  Stress  Exercise was stopped due to fatigue.  There was a normal BP response to exercise.  Target Heart Rate was not achieved due to fatigue.  Suboptimal stress test due to inadequate maximum heart rate.  J point/ST elevation at rest pseudonormalized with exercise.  There was no arrhythmia.  The Duke treadmill score was intermediate risk ( -11< Sterling score <5).  Normal left ventricular function and wall motion at rest and post-stress.  The visual ejection fraction is estimated at 60-65%.     Baseline  The patient is in normal sinus rhythm.  Baseline ECG displays Q waves in the mike-septal leads.  Elevated J point V3-V6, inferior leads - most C/W early repolarization.  Normal left ventricular function and  wall motion at rest and post-stress.  The visual ejection fraction is estimated at 60-65%.     Stress Results         Protocol:  Eliezer Protocol        Maximum Predicted HR:   158 bpm         Target HR: 134 bpm               % Maximum Predicted HR: 66 %        Stage  DurationHeart Rate  BP                    Comment             (mm:ss)   (bpm)    Stage 1   3:00      104   158/80Patient requested to stop   RecoveryR  4:00      56    134/74RPP = 03718, Sterling = 2, FAC = Below average             Stress Duration:   3:00 mm:ss        Recovery Time: 4:00 mm:ss          Maximum Stress HR: 104 bpm           METS:          4     Mitral Valve  There is no mitral regurgitation noted.     Tricuspid Valve  No tricuspid regurgitation.     Aortic Valve  No aortic regurgitation is present. No hemodynamically significant valvular  aortic stenosis.  ______________________________________________________________________________  MMode/2D Measurements & Calculations  IVSd: 1.00 cm     LVIDd: 4.0 cm  LVIDs: 2.8 cm  LVPWd: 0.73 cm  ECG Reviewed:  Date: Results:    Cath:  Date: 10/4/23 Results:     Prox RCA lesion is 45% stenosed.     RPAV-1 lesion is 30% stenosed.     RPAV-2 lesion is 50% stenosed.     1st Mrg lesion is 45% stenosed.     1st Diag lesion is 100% stenosed.     Dist Cx lesion is 55% stenosed.     1. Right dominant coronary anatomy.  2. Completely occluded (probably chronic) D1 with left to left collaterals from distal LAD to D1.  3. Moderate coronary artery disease involving proximal to mid RCA which is not hemodynamically significant-IFR negative.  4. Moderate coronary disease involving distal small-caliber RPL and distal circumflex artery.   (-) valvular problems/murmurs   METS/Exercise Tolerance: 3 - Able to walk 1-2 blocks without stopping    Hematologic: Comments: Lupus    (+) History of blood clots,     anemia, history of blood transfusion,         Musculoskeletal: Comment: Charcot Breonna Tooth - neg musculoskeletal ROS      GI/Hepatic:     (+) GERD,                (-) liver disease   Renal/Genitourinary:    (-) renal disease   Endo:    (-) Type II DM, thyroid disease and obesity   Psychiatric/Substance Use:     (+) psychiatric history anxiety and depression   Recreational drug usage: Cannabis.    Infectious Disease:  - neg infectious disease ROS     Malignancy:  - neg malignancy ROS     Other:  - neg other ROS          Physical Exam    Airway        Mallampati: II   TM distance: > 3 FB   Neck ROM: full   Mouth opening: > 3 cm    Respiratory Devices and Support         Dental       (+) Modest Abnormalities - crowns, retainers, 1 or 2 missing teeth      Cardiovascular   cardiovascular exam normal          Pulmonary   pulmonary exam normal                OUTSIDE LABS:  CBC:   Lab Results   Component Value Date    WBC 4.8 12/12/2023    WBC 4.8 12/11/2023    HGB 8.4 (L) 12/12/2023    HGB 8.6 (L) 12/11/2023    HCT 25.6 (L) 12/12/2023    HCT 24.3 (L) 12/11/2023     12/12/2023     12/11/2023     BMP:   Lab Results   Component Value Date     (L) 12/11/2023     (L) 12/10/2023    POTASSIUM 4.4 12/11/2023    POTASSIUM 4.5 12/10/2023    CHLORIDE 104 12/11/2023    CHLORIDE 100 12/10/2023    CO2 19 (L) 12/11/2023    CO2 20 (L) 12/10/2023    BUN 24.9 (H) 12/11/2023    BUN 23.4 (H) 12/10/2023    CR 0.87 12/11/2023    CR 0.79 12/10/2023    GLC 86 12/11/2023     (H) 12/10/2023     COAGS:   Lab Results   Component Value Date    PTT 27 12/12/2023    INR 1.38 (H) 10/07/2023    FIBR 118 (L) 10/07/2023     POC:   Lab Results   Component Value Date    BGM 87 07/08/2021     HEPATIC:   Lab Results   Component Value Date    ALBUMIN 2.9 (L) 10/07/2023    PROTTOTAL 4.3 (L) 10/07/2023    ALT 15 10/07/2023    AST 18 10/07/2023    ALKPHOS 35 10/07/2023    BILITOTAL 0.2 10/07/2023     OTHER:   Lab Results   Component Value Date    PH 7.27 (L) 10/04/2023    LACT <0.3 10/04/2023    A1C 5.3 02/15/2022    SONIA 8.3 (L) 12/11/2023    PHOS  4.0 07/09/2021    MAG 1.9 10/14/2023    LIPASE 132 08/10/2021    AMYLASE 46 08/14/2017    TSH 0.77 01/05/2021    T4 1.19 03/28/2017    CRP <2.9 09/18/2014    SED 39 (H) 11/13/2023       Anesthesia Plan    ASA Status:  3    NPO Status:  NPO Appropriate    Anesthesia Type: MAC.     - Reason for MAC: immobility needed              Consents    Anesthesia Plan(s) and associated risks, benefits, and realistic alternatives discussed. Questions answered and patient/representative(s) expressed understanding.     - Discussed:     - Discussed with:  Patient            Postoperative Care    Pain management: IV analgesics.   PONV prophylaxis: Ondansetron (or other 5HT-3)     Comments:               William Butler MD    I have reviewed the pertinent notes and labs in the chart from the past 30 days and (re)examined the patient.  Any updates or changes from those notes are reflected in this note.

## 2023-12-12 NOTE — PROGRESS NOTES
[facility]    Medicine Progress Note - Hospitalist Service        Date of Admission:  12/10/2023  4:00 PM    Assessment & Plan:   63-year-old female who was recently hospitalized from 10/3-10/14/23 for acute occlusion of her RLE PTFE Graft S/p embolectomy and thromectomy by vascular surgery. Patient received 2 units of PRBCs intraoperatively.  Hemoglobin was 7.2 prior to procedure and during that hospitalization hemoglobins stable around 9 g/dL. She presented to the emergency room on 12/10/2023 with complaints of worsening right lower extremity pain and dyspnea on exertion and was found to be anemic with hemoglobin of 6. Her occult stool blood testing returned positive.  She was admitted for further evaluation of acute on chronic anemia.     Acute on chronic possible blood loss anemia while on anticoagulation with Plavix and Xarelto.  Rule out GI bleed  History of GERD  -Patient presents with a hemoglobin of 6.0, her previous hemoglobin on 11/13/2023 was 10.5  -Concern for acute blood loss anemia most likely from GI source although patient denies hematochezia or melena or bright red bleeding  -She received 2 unit of PRBC altogether, hemoglobin is now stable at 8.4  -Evaluated by [facility], plan for EGD and colonoscopy today  -Serial hemoglobin has been stable  -Started on heparin drip and lieu of Xarelto this hospitalization.  Currently on hold since this morning for GI procedure.  -Continue omeprazole 40 mg twice a day  -Monitor for clinical evidence of GI bleed and transfuse as clinically indicated    S/p recent emergent right femoral to anterior tibial PTFE graft thrombectomy, anterior tibial thrombectomy intraoperative angiography by vascular surgery on 10/11/2023.  History of peripheral artery disease status post bilateral lower extremity bypass grafting.  History of bilateral carotid endarterectomy.  -As noted above patient underwent emergent graft thrombectomy during recent hospitalization  and October   -Current ultrasound showed bilateral lower extremity bypass grafts are patent without any stenosis  -Vascular surgery consulted  -Prior to admission Xarelto and Plavix on hold due to concerns for bleeding however, patient was started on low intensity heparin drip due to her extensive history of peripheral arterial disease and recent thrombosis of the graft needing embolectomy and thrombectomy  -As mentioned above heparin drip has been on hold since 7:30 AM this morning.  Will resume after procedure as soon as it is possible depending on EGD/colonoscopy findings.     History of Takotsubo cardiomyopathy with EF of 30 to 35%  -Patient had recent coronary angiogram on 10/4/2023 which showed no new lesions  -Continue PTA meds including Coreg, Norvasc, statin  -Resume prior to admission lisinopril  -Continue prior to admission Jardiance     COPD  -PTA regimen includes Leelee-Ellipta. Well controlled.   -Continue PTA inhaler  -Respiratory status stable currently       Diet: NPO per Anesthesia Guidelines for Procedure/Surgery Except for: Meds     DVT Prophylaxis: Heparin drip, on hold this morning  Alvarez Catheter: Not present  Code Status: Full Code     Disposition Plan      Expected Discharge Date: 12/14/2023      Destination: home        Entered: Vadim Ramirez MD 12/12/2023, 11:04 AM        Clinically Significant Risk Factors                  # Hypertension: Noted on problem list            # Financial/Environmental Concerns: none              The patient's care was discussed with the Bedside Nurse and Patient.    Medical Decision Making       **CLEAR ALL SELECTIONS**      Labs/Imaging Reviewed:  See Information above and Data section below  Time SPENT BY ME on the date of service doing chart review, history, exam, documentation & further activities per the note:  35 MINUTES    Chart documentation was completed, in part, with [employer] voice-recognition software. Even though reviewed, some grammatical,  "spelling, and word errors may remain.    Vadim Ramirez MD  Hospitalist Service    Text Page 7AM-6PM  Securely message with the Vocera Web Console (learn more here)  Text page via [employer] Paging/Directory    ______________________________________________________________________    Interval History   Patient feels a little tired with the colonoscopy prep overnight.  No reported bright red bleeding or bloody stool.  Hemoglobin has been stable.  She is going for EGD and colonoscopy today.    Data reviewed today: I reviewed all medications, new labs and imaging results over the last 24 hours. I personally reviewed no images or EKG's today.    Physical Exam   Vital signs:  Temp: 97.5  F (36.4  C) Temp src: Oral BP: (!) 157/72 Pulse: 61   Resp: 16 SpO2: 96 % O2 Device: None (Room air)     Weight: 50.8 kg (112 lb)  Estimated body mass index is 20.65 kg/m  as calculated from the following:    Height as of 11/21/23: 1.568 m (5' 1.75\").    Weight as of this encounter: 50.8 kg (112 lb).      Wt Readings from Last 2 Encounters:   12/10/23 50.8 kg (112 lb)   11/21/23 50.6 kg (111 lb 8 oz)       Gen: AAOX3, NAD, comfortable  HEENT: Pale conjunctiva  Resp: Diminished at the bases bilaterally, normal effort of breathing  CVS: RRR, no murmur  Abd/GI: Soft, non-tender. BS- normoactive.    Skin: Warm, dry no rashes  MSK: no pedal edema  Neuro- CN- intact. No focal deficits.      Data   Recent Labs   Lab 12/12/23  0529 12/11/23  2213 12/11/23  1126 12/11/23  0550 12/10/23  2311 12/10/23  2202 12/10/23  1635   WBC 4.8  --   --  4.8  --   --  5.4   HGB 8.4* 8.6* 8.9* 8.0*   < >  --  6.0*   MCV 87  --   --  86  --   --  89     --   --  204  --   --  259   NA  --   --   --  133*  --   --  131*   POTASSIUM  --   --   --  4.4  --   --  4.5   CHLORIDE  --   --   --  104  --   --  100   CO2  --   --   --  19*  --   --  20*   BUN  --   --   --  24.9*  --   --  23.4*   CR  --   --   --  0.87  --   --  " 0.79   ANIONGAP  --   --   --  10  --   --  11   SONIA  --   --   --  8.3*  --   --  8.9   GLC  --   --   --  86  --  119* 94    < > = values in this interval not displayed.       No results found for this or any previous visit (from the past 24 hour(s)).    Medications    [Held by provider] heparin Stopped (12/12/23 0731)    - MEDICATION INSTRUCTIONS -        acetaminophen  1,000 mg Oral Q8H    amLODIPine  2.5 mg Oral Daily    carvedilol  6.25 mg Oral BID w/meals    empagliflozin  10 mg Oral Daily    famotidine  20 mg Intravenous Q24H    ferrous sulfate  325 mg Oral Every Other Day    fluticasone-vilanterol  1 puff Inhalation Daily    gabapentin  100 mg Oral TID    nicotine  1 patch Transdermal Daily    nicotine   Transdermal Q8H    omeprazole  40 mg Oral BID AC    rosuvastatin  40 mg Oral At Bedtime    senna-docusate  1 tablet Oral BID    sodium chloride (PF)  10-40 mL Intracatheter Q7 Days    sodium chloride (PF)  3 mL Intracatheter Q8H

## 2023-12-12 NOTE — PLAN OF CARE
Goal Outcome Evaluation:                 Outcome Evaluation: Pt Ax4, /76 md paged for PRN meds, BP improved with hydralazine. NSR rhyth. Bowel prep started and pt developed n/v, meds given and pt attempted to drink most of bowel prep. Pt had about 1/2 cup left of bowel prep. Will continue to monitor pt and update regarding bowel prep for procedure today.

## 2023-12-12 NOTE — PROGRESS NOTES
VASCULAR SURGERY    Unable to complete Colonoscopy due to incomplete prep.    Back on IV Heparin.    +2 right DP pulse.    Chadwick Lozano MD

## 2023-12-13 PROBLEM — C85.91: Status: ACTIVE | Noted: 2023-12-13

## 2023-12-13 LAB
APTT PPP: 103 SECONDS (ref 22–38)
APTT PPP: 36 SECONDS (ref 22–38)
APTT PPP: 70 SECONDS (ref 22–38)
ERYTHROCYTE [DISTWIDTH] IN BLOOD BY AUTOMATED COUNT: 15.1 % (ref 10–15)
HCT VFR BLD AUTO: 23.3 % (ref 35–47)
HGB BLD-MCNC: 7.1 G/DL (ref 11.7–15.7)
HGB BLD-MCNC: 7.4 G/DL (ref 11.7–15.7)
HGB BLD-MCNC: 7.5 G/DL (ref 11.7–15.7)
MCH RBC QN AUTO: 28.7 PG (ref 26.5–33)
MCHC RBC AUTO-ENTMCNC: 32.2 G/DL (ref 31.5–36.5)
MCV RBC AUTO: 89 FL (ref 78–100)
PLATELET # BLD AUTO: 206 10E3/UL (ref 150–450)
RBC # BLD AUTO: 2.61 10E6/UL (ref 3.8–5.2)
UFH PPP CHRO-ACNC: 0.1 IU/ML
WBC # BLD AUTO: 4.4 10E3/UL (ref 4–11)

## 2023-12-13 PROCEDURE — 250N000013 HC RX MED GY IP 250 OP 250 PS 637: Performed by: INTERNAL MEDICINE

## 2023-12-13 PROCEDURE — 99231 SBSQ HOSP IP/OBS SF/LOW 25: CPT | Mod: 24

## 2023-12-13 PROCEDURE — 85014 HEMATOCRIT: CPT | Performed by: INTERNAL MEDICINE

## 2023-12-13 PROCEDURE — 85018 HEMOGLOBIN: CPT | Performed by: INTERNAL MEDICINE

## 2023-12-13 PROCEDURE — 85730 THROMBOPLASTIN TIME PARTIAL: CPT | Performed by: INTERNAL MEDICINE

## 2023-12-13 PROCEDURE — 99233 SBSQ HOSP IP/OBS HIGH 50: CPT | Performed by: INTERNAL MEDICINE

## 2023-12-13 PROCEDURE — 250N000011 HC RX IP 250 OP 636: Mod: JZ | Performed by: INTERNAL MEDICINE

## 2023-12-13 PROCEDURE — 85520 HEPARIN ASSAY: CPT | Performed by: INTERNAL MEDICINE

## 2023-12-13 PROCEDURE — 258N000003 HC RX IP 258 OP 636: Performed by: INTERNAL MEDICINE

## 2023-12-13 PROCEDURE — 120N000013 HC R&B IMCU

## 2023-12-13 PROCEDURE — 99231 SBSQ HOSP IP/OBS SF/LOW 25: CPT | Mod: 24 | Performed by: SURGERY

## 2023-12-13 RX ORDER — LIDOCAINE 40 MG/G
CREAM TOPICAL
Status: CANCELLED | OUTPATIENT
Start: 2023-12-13

## 2023-12-13 RX ORDER — BISACODYL 5 MG
10 TABLET, DELAYED RELEASE (ENTERIC COATED) ORAL ONCE
Status: COMPLETED | OUTPATIENT
Start: 2023-12-13 | End: 2023-12-13

## 2023-12-13 RX ORDER — ONDANSETRON 2 MG/ML
4 INJECTION INTRAMUSCULAR; INTRAVENOUS
Status: CANCELLED | OUTPATIENT
Start: 2023-12-13

## 2023-12-13 RX ORDER — MAGNESIUM CARB/ALUMINUM HYDROX 105-160MG
296 TABLET,CHEWABLE ORAL ONCE
Status: DISCONTINUED | OUTPATIENT
Start: 2023-12-14 | End: 2023-12-13

## 2023-12-13 RX ORDER — POLYETHYLENE GLYCOL 3350 17 G/17G
238 POWDER, FOR SOLUTION ORAL ONCE
Status: COMPLETED | OUTPATIENT
Start: 2023-12-13 | End: 2023-12-13

## 2023-12-13 RX ADMIN — POLYETHYLENE GLYCOL 3350 238 G: 17 POWDER, FOR SOLUTION ORAL at 17:48

## 2023-12-13 RX ADMIN — NICOTINE 1 PATCH: 14 PATCH, EXTENDED RELEASE TRANSDERMAL at 09:04

## 2023-12-13 RX ADMIN — LISINOPRIL 10 MG: 10 TABLET ORAL at 09:05

## 2023-12-13 RX ADMIN — BISACODYL 10 MG: 5 TABLET, COATED ORAL at 14:37

## 2023-12-13 RX ADMIN — FERROUS SULFATE TAB 325 MG (65 MG ELEMENTAL FE) 325 MG: 325 (65 FE) TAB at 09:04

## 2023-12-13 RX ADMIN — OMEPRAZOLE 40 MG: 20 CAPSULE, DELAYED RELEASE ORAL at 06:39

## 2023-12-13 RX ADMIN — FLUTICASONE FUROATE AND VILANTEROL TRIFENATATE 1 PUFF: 200; 25 POWDER RESPIRATORY (INHALATION) at 09:05

## 2023-12-13 RX ADMIN — DEXTROSE AND SODIUM CHLORIDE: 5; 900 INJECTION, SOLUTION INTRAVENOUS at 11:43

## 2023-12-13 RX ADMIN — GABAPENTIN 100 MG: 100 CAPSULE ORAL at 21:09

## 2023-12-13 RX ADMIN — GABAPENTIN 100 MG: 100 CAPSULE ORAL at 15:55

## 2023-12-13 RX ADMIN — FAMOTIDINE 20 MG: 10 INJECTION INTRAVENOUS at 09:04

## 2023-12-13 RX ADMIN — DOCUSATE SODIUM 50 MG AND SENNOSIDES 8.6 MG 1 TABLET: 8.6; 5 TABLET, FILM COATED ORAL at 20:18

## 2023-12-13 RX ADMIN — AMLODIPINE BESYLATE 2.5 MG: 2.5 TABLET ORAL at 09:04

## 2023-12-13 RX ADMIN — OMEPRAZOLE 40 MG: 20 CAPSULE, DELAYED RELEASE ORAL at 15:55

## 2023-12-13 RX ADMIN — ACETAMINOPHEN 1000 MG: 500 TABLET, FILM COATED ORAL at 14:37

## 2023-12-13 RX ADMIN — ACETAMINOPHEN 1000 MG: 500 TABLET, FILM COATED ORAL at 20:19

## 2023-12-13 RX ADMIN — ACETAMINOPHEN 1000 MG: 500 TABLET, FILM COATED ORAL at 05:21

## 2023-12-13 RX ADMIN — CARVEDILOL 6.25 MG: 6.25 TABLET, FILM COATED ORAL at 09:04

## 2023-12-13 RX ADMIN — EMPAGLIFLOZIN 10 MG: 10 TABLET, FILM COATED ORAL at 09:05

## 2023-12-13 RX ADMIN — ROSUVASTATIN CALCIUM 40 MG: 20 TABLET, FILM COATED ORAL at 21:09

## 2023-12-13 RX ADMIN — GABAPENTIN 100 MG: 100 CAPSULE ORAL at 09:05

## 2023-12-13 ASSESSMENT — ACTIVITIES OF DAILY LIVING (ADL)
ADLS_ACUITY_SCORE: 24

## 2023-12-13 NOTE — PROGRESS NOTES
"VASCULAR SURGERY PROGRESS NOTE    Subjective:  Resting comfortably in bed. Unable to do EGD/colonoscopy yesterday. Currently heparin is off.     Objective:  Intake/Output Summary (Last 24 hours) at 12/13/2023 0926  Last data filed at 12/12/2023 1241  Gross per 24 hour   Intake 100 ml   Output --   Net 100 ml     PHYSICAL EXAM:  /61 (BP Location: Left arm)   Pulse 54   Temp 97.6  F (36.4  C) (Oral)   Resp 12   Ht 1.549 m (5' 1\")   Wt 50.8 kg (112 lb)   LMP  (LMP Unknown)   SpO2 98%   BMI 21.16 kg/m    Alert and oriented x4, neurologically intact, no issues with the right leg  Excellent doppler of the right distal AT/DP and graft      PLAN:  -try to minimize time off of heparin gtt/anticoagulation as much as possible  -continue to monitor CMS/doppler exam    Kaylee Liu NP  VASCULAR SURGERY                   "

## 2023-12-13 NOTE — PROGRESS NOTES
Vascular surgery brief progress note    Patient doing okay.  Per patient, colonoscopy is not being done due to inadequate prep.    Palpable graft with pulses with Doppler signal in the DP    Hemoglobin 8.5    Patient waiting for EGD/colonoscopy to locate the source of bleeding.  Please resume heparin drip if the patient is not going to procedure today.    Kalpana Berg MD  Vascular Surgery Fellow    STAFF: Doing fine this morning.  With ongoing bowel prep has not noted any melanotic stools.  EGD was normal yesterday but could not complete colonoscopy which is scheduled for earlier today.  Heparin is again on hold.     Foot is warm and pink with no swelling.  Good Doppler signals in AT/DP    Restart heparin as soon as feasible following colonoscopy due to marked vascular issues.  Long-term anticoagulation will need to be evaluated but with multiple vascular issues at this he is critical.       Chadwick Lozano MD

## 2023-12-13 NOTE — PROGRESS NOTES
[facility] Hematology Progress Note     Primary Oncologist/Hematologist:  Dr. 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 Mendoza -> needle biopsy 9/22/23 revealed atypical lymphoid population, immunophenotyping is compatible with malignant B-cell lymphoma.  - LDH WNL  - 10/11/23 excisional node biopsy revealed low grade B cell lymphoma with plasmacytic differentiation, consistent with extranodal marginal zone lymphoma   - 10/27/23 EGD negative   - 10/30/23 PET-CT FDG avid right retropharyngeal and right level 2 cervical lymph node consistent with known lymphoma. There is also FDG avid disease involving the right parotid gland which appears to extend into the right masseter muscle. 2. FDG avid subcutaneous/intramuscular nodule of the left back is favored an additional site of lymphomatous involvement given interval increase in size      Acute on chronic anemia  Hg 6.0 from 10.5 1 month prior. Suspicious for acute blood loss anemia and less likely lymphoma related.   - had been on Plavix and Xaralto.  Now held.  - started on Heparin drip .  - has received 2 PRBC with improvement in hgb to mid-8 range.  Now 7.5 this morning.   - Retic 1.9%, iron saturation 32%,   - EGD negative for source of bleeding.   - iron studies, B12, folate, haptoglobin normal.  PBS shows normochromic, normocytic anemia.   - inadequate prep for colonoscopy. Per pt, this is now planned for tomorrow      S/p recent emergent right femoral to anterior tibial PTFE graft thrombectomy, anterior tibial thrombectomy intraoperative angiography by vascular surgery on 10/11/2023.  History of peripheral artery disease status post bilateral lower extremity bypass grafting.  History of bilateral carotid endarterectomy.  -Patient underwent emergent graft thrombectomy during recent  hospitalization and October   -Current ultrasound showed bilateral lower extremity bypass grafts are patent without any stenosis  -Vascular surgery consulted  -Prior to admission Xarelto and Plavix on hold due to concerns for bleeding. Patient was started on low intensity heparin drip due to her extensive history of peripheral arterial disease and recent thrombosis of the graft needing embolectomy and thrombectomy     Pruritus  - possibly 2/2 lymphoma  - pending derm evaluation      PLAN  - Outpatient bone marrow scheduled 12/19.    - F/U GI work up   - As stated above, given acuity of hemoglobin drop, this is more suspicious for blood loss anemia rather than bone marrow involvement of lymphoma   - Have notified Derm that she is hospitalized. They will reach out to her to reschedule to next week.         NOELLE Frazier, AOCNP  Nurse Practitioner  [facility]  [phone]          Interval History:     Unable to tolerate bowel prep. Plans to try again this devendra with goal of colonoscopy tomorrow.               Review of Systems:     The 5 point Review of Systems is negative other than noted in the HPI                Medications:   Scheduled Medications   acetaminophen  1,000 mg Oral Q8H    amLODIPine  2.5 mg Oral Daily    carvedilol  6.25 mg Oral BID w/meals    empagliflozin  10 mg Oral Daily    famotidine  20 mg Intravenous Q24H    ferrous sulfate  325 mg Oral Every Other Day    fluticasone-vilanterol  1 puff Inhalation Daily    gabapentin  100 mg Oral TID    lisinopril  10 mg Oral Daily    nicotine  1 patch Transdermal Daily    nicotine   Transdermal Q8H    omeprazole  40 mg Oral BID AC    rosuvastatin  40 mg Oral At Bedtime    senna-docusate  1 tablet Oral BID    sodium chloride (PF)  10-40 mL Intracatheter Q7 Days    sodium chloride (PF)  3 mL Intracatheter Q8H     PRN Medications  calcium carbonate, hydrALAZINE, HYDROmorphone, lidocaine 4%, lidocaine (buffered or not buffered), lidocaine (buffered  "or not buffered), LORazepam, naloxone **OR** naloxone **OR** naloxone **OR** naloxone, nitroGLYcerin, ondansetron **OR** ondansetron, oxyCODONE, - MEDICATION INSTRUCTIONS -, polyethylene glycol, prochlorperazine **OR** prochlorperazine **OR** prochlorperazine, senna-docusate **OR** senna-docusate, sodium chloride (PF), sodium chloride (PF), sodium chloride (PF), sodium chloride (PF), sodium chloride 0.9%, triamcinolone               Physical Exam:   Vitals were reviewed  Blood pressure 110/62, pulse 57, temperature 97.7  F (36.5  C), temperature source Oral, resp. rate (!) 0, height 1.549 m (5' 1\"), weight 50.8 kg (112 lb), SpO2 99%, not currently breastfeeding.  Wt Readings from Last 4 Encounters:   12/12/23 50.8 kg (112 lb)   11/21/23 50.6 kg (111 lb 8 oz)   11/13/23 51.6 kg (113 lb 11.2 oz)   10/11/23 52.5 kg (115 lb 11.2 oz)       I/O last 3 completed shifts:  In: 100 [I.V.:100]  Out: -                  Data:   All laboratory data and imaging studies reviewed.    CMP  Recent Labs   Lab 12/11/23  0550 12/10/23  2202 12/10/23  1635   *  --  131*   POTASSIUM 4.4  --  4.5   CHLORIDE 104  --  100   CO2 19*  --  20*   ANIONGAP 10  --  11   GLC 86 119* 94   BUN 24.9*  --  23.4*   CR 0.87  --  0.79   GFRESTIMATED 74  --  83   SONIA 8.3*  --  8.9     CBC  Recent Labs   Lab 12/13/23  0527 12/12/23  2119 12/12/23  1509 12/12/23  0529 12/11/23  1126 12/11/23  0550 12/10/23  2311 12/10/23  1635   WBC 4.4  --   --  4.8  --  4.8  --  5.4   RBC 2.61*  --   --  2.93*  --  2.84*  --  2.16*   HGB 7.5* 7.5* 8.5* 8.4*   < > 8.0*   < > 6.0*   HCT 23.3*  --   --  25.6*  --  24.3*  --  19.2*   MCV 89  --   --  87  --  86  --  89   MCH 28.7  --   --  28.7  --  28.2  --  27.8   MCHC 32.2  --   --  32.8  --  32.9  --  31.3*   RDW 15.1*  --   --  15.1*  --  14.6  --  14.6     --   --  245  --  204  --  259    < > = values in this interval not displayed.     INRNo lab results found in last 7 days.        David DRAKE, " AOCNP  Nurse Practitioner  [facility]  [phone]

## 2023-12-13 NOTE — PLAN OF CARE
Goal Outcome Evaluation:    Orientations: AOx4  Vitals/Pain: VSS, Pt on RA, denies pain   Tele: Afib CVR  Lines/Drains: RUE double lumen PICC- Hep gtt infusing per eMAR  Skin/Wounds: Rash to upper and lower back  GI/: Beginning bowel prep  LS: Diminished bases  Labs: Abnormal/Trends, Electrolyte Replacement- 1500 HepXa collected   Ambulation/Assist: SBA  Plan: Colonoscopy tmrw

## 2023-12-13 NOTE — PROGRESS NOTES
[facility]    Medicine Progress Note - Hospitalist Service        Date of Admission:  12/10/2023  4:00 PM    Assessment & Plan:   63-year-old female who was recently hospitalized from 10/3-10/14/23 for acute occlusion of her RLE PTFE Graft S/p embolectomy and thromectomy by vascular surgery. Patient received 2 units of PRBCs intraoperatively.  Hemoglobin was 7.2 prior to procedure and during that hospitalization hemoglobins stable around 9 g/dL. She presented to the emergency room on 12/10/2023 with complaints of worsening right lower extremity pain and dyspnea on exertion and was found to be anemic with hemoglobin of 6. Her occult stool blood testing returned positive.  She was admitted for further evaluation of acute on chronic anemia.     Acute on chronic possible blood loss anemia while on anticoagulation with Plavix and Xarelto.  Rule out GI bleed  History of GERD  -Patient presents with a hemoglobin of 6.0, her previous hemoglobin on 11/13/2023 was 10.5  -Iron studies within normal limits, B12 also normal.  Normal LDH argues against hemolytic process.  -Concern for acute blood loss anemia most likely from GI source although patient denies hematochezia or melena or bright red bleeding  -Hemoglobin has been down since admission.  Altogether received 2 units of PRBC and hemoglobin peaked at 8.5 with subsequent drop to 7.5 this morning.  -Evaluated by [facility], underwent EGD yesterday which was unremarkable as far as source of bleeding, and adequately prep for colonoscopy yesterday and today, therefore unable to perform  -Had a long discussion with patient today, she is agreeable to taking the prep, plan for colonoscopy tomorrow at 12:15 PM.  -Started on heparin drip and lieu of Xarelto this hospitalization.  Continue heparin drip, orders placed reported on hold at 8 AM tomorrow morning.  -Continue omeprazole 40 mg twice a day  -Monitor for clinical evidence of GI bleed and transfuse as  clinically indicated    S/p recent emergent right femoral to anterior tibial PTFE graft thrombectomy, anterior tibial thrombectomy intraoperative angiography by vascular surgery on 10/11/2023.  History of peripheral artery disease status post bilateral lower extremity bypass grafting.  History of bilateral carotid endarterectomy.  -As noted above patient underwent emergent graft thrombectomy during recent hospitalization and October   -Current ultrasound showed bilateral lower extremity bypass grafts are patent without any stenosis  -Vascular surgery consulted  -Prior to admission Xarelto and Plavix on hold due to concerns for bleeding however, patient was started on low intensity heparin drip due to her extensive history of peripheral arterial disease and recent thrombosis of the graft needing embolectomy and thrombectomy  -As mentioned above patient is on heparin drip with plan to hold prior to procedure.    History of Takotsubo cardiomyopathy with EF of 30 to 35%  -Patient had recent coronary angiogram on 10/4/2023 which showed no new lesions  -Continue PTA meds including Coreg, Norvasc, statin  -Resume prior to admission lisinopril  -Continue prior to admission Jardiance  -IV fluids started as patient has not had much to eat and drink since yesterday, monitor volume status closely.     COPD  -PTA regimen includes Leelee-Ellipta. Well controlled.   -Continue PTA inhaler  -Respiratory status stable currently on    History of marginal zone lymphoma  -Oncology following       Diet: Clear Liquid Diet     DVT Prophylaxis: Heparin drip, on hold this morning  Alvarez Catheter: Not present  Code Status: Full Code     Disposition Plan      Expected Discharge Date: 12/14/2023      Destination: home  Discharge Comments: 12/12: colonoscopy  and egd today      Entered: Vadim Ramirez MD 12/13/2023, 8:22 AM        Clinically Significant Risk Factors                  # Hypertension: Noted on problem list            #  "Financial/Environmental Concerns: none         The patient's care was discussed with the Bedside Nurse and Patient.  And sister on the phone    Medical Decision Making       **CLEAR ALL SELECTIONS**      Labs/Imaging Reviewed:  See Information above and Data section below  Time SPENT BY ME on the date of service doing chart review, history, exam, documentation & further activities per the note:  50 MINUTES    Chart documentation was completed, in part, with [employer] voice-recognition software. Even though reviewed, some grammatical, spelling, and word errors may remain.    Vadim Ramirez MD  Hospitalist Service  [facility]  Text Page 7AM-6PM  Securely message with the Vocera Web Console (learn more here)  Text page via [employer] Paging/Directory    ______________________________________________________________________    Interval History   Patient could not tolerate prep well last night and therefore was suboptimally prepared for colonoscopy therefore procedure canceled.  Hemoglobin dropped somewhat.  No hematochezia or melena reported.  Patient was frustrated about overall situation however she is agreeable to taking the bowel prep later this evening.    Data reviewed today: I reviewed all medications, new labs and imaging results over the last 24 hours. I personally reviewed no images or EKG's today.    Physical Exam   Vital signs:  Temp: 97.6  F (36.4  C) Temp src: Oral BP: 128/61 Pulse: 54   Resp: 12 SpO2: 98 % O2 Device: None (Room air)   Height: 154.9 cm (5' 1\") Weight: 50.8 kg (112 lb)  Estimated body mass index is 21.16 kg/m  as calculated from the following:    Height as of this encounter: 1.549 m (5' 1\").    Weight as of this encounter: 50.8 kg (112 lb).      Wt Readings from Last 2 Encounters:   12/12/23 50.8 kg (112 lb)   11/21/23 50.6 kg (111 lb 8 oz)       Gen: AAOX3, NAD, comfortable  HEENT: Pale conjunctiva  Resp: Diminished at the bases bilaterally, normal effort of " breathing  CVS: RRR, no murmur  Abd/GI: Soft, non-tender. BS- normoactive.    Skin: Warm, dry no rashes  MSK: no pedal edema  Neuro- CN- intact. No focal deficits.      Data   Recent Labs   Lab 12/13/23  0527 12/12/23  2119 12/12/23  1509 12/12/23  0529 12/11/23  1126 12/11/23  0550 12/10/23  2311 12/10/23  2202 12/10/23  1635   WBC 4.4  --   --  4.8  --  4.8  --   --  5.4   HGB 7.5* 7.5* 8.5* 8.4*   < > 8.0*   < >  --  6.0*   MCV 89  --   --  87  --  86  --   --  89     --   --  245  --  204  --   --  259   NA  --   --   --   --   --  133*  --   --  131*   POTASSIUM  --   --   --   --   --  4.4  --   --  4.5   CHLORIDE  --   --   --   --   --  104  --   --  100   CO2  --   --   --   --   --  19*  --   --  20*   BUN  --   --   --   --   --  24.9*  --   --  23.4*   CR  --   --   --   --   --  0.87  --   --  0.79   ANIONGAP  --   --   --   --   --  10  --   --  11   SONIA  --   --   --   --   --  8.3*  --   --  8.9   GLC  --   --   --   --   --  86  --  119* 94    < > = values in this interval not displayed.       No results found for this or any previous visit (from the past 24 hour(s)).    Medications    heparin Stopped (12/13/23 0618)    - MEDICATION INSTRUCTIONS -        acetaminophen  1,000 mg Oral Q8H    amLODIPine  2.5 mg Oral Daily    carvedilol  6.25 mg Oral BID w/meals    empagliflozin  10 mg Oral Daily    famotidine  20 mg Intravenous Q24H    ferrous sulfate  325 mg Oral Every Other Day    fluticasone-vilanterol  1 puff Inhalation Daily    gabapentin  100 mg Oral TID    lisinopril  10 mg Oral Daily    nicotine  1 patch Transdermal Daily    nicotine   Transdermal Q8H    omeprazole  40 mg Oral BID AC    rosuvastatin  40 mg Oral At Bedtime    senna-docusate  1 tablet Oral BID    sodium chloride (PF)  10-40 mL Intracatheter Q7 Days    sodium chloride (PF)  3 mL Intracatheter Q8H

## 2023-12-13 NOTE — PLAN OF CARE
Neuro: A&O X4  Tele/cardiac: SR/SB  Respiration: On RA  Activity: SBA  Pain: Tylenol given for headache  Drips: PICC SL, heparin held  Drains/tubes: none  Skin: rash to upper and lower back  GI/: NPO from midnight  Aggression color: green  Isolation: none  Plan: was unable to complete bowel prep, attempted to place NGT per orders however pt did not tolerate procedure

## 2023-12-13 NOTE — PROGRESS NOTES
"  Gastroenterology Progress Note     Subjective   Patient is a 63-year-old that we have been asked to see regarding anemia.  She was scheduled for an EGD and colonoscopy which was done on December 12, 2023. EGD showed nonsevere reflux esophagitis without bleeding.  She did have a small hiatal hernia.  There is a cobblestone appearance to the gastric body.  The remainder the exam was normal.  No evidence of GI blood loss.  The colonoscopy was aborted due to solid green and brown stool present.  There is no evidence of GI bleeding such as red stool or black stool.  She was placed on the schedule today for colonoscopy.  She refused the prep last evening and therefore procedure today was canceled.    She states that she is willing to have the procedure done tomorrow and go through with a additional colon preparation.  She requested MiraLAX and Gatorade.     Objective     Vitals Blood pressure 110/62, pulse 57, temperature 97.7  F (36.5  C), temperature source Oral, resp. rate (!) 0, height 1.549 m (5' 1\"), weight 50.8 kg (112 lb), SpO2 99%, not currently breastfeeding.          Physical Exam  General: awake, alert, oriented times three   Neurologic: grossly intact, moves all four extremities        Laboratory     Electrolytes    Recent Labs   Lab 12/11/23  0550 12/10/23  2202 12/10/23  1635   *  --  131*   POTASSIUM 4.4  --  4.5   CHLORIDE 104  --  100   CO2 19*  --  20*   GLC 86 119* 94   CR 0.87  --  0.79   BUN 24.9*  --  23.4*      Hematology    Recent Labs   Lab 12/13/23  0527 12/12/23  2119 12/12/23  1509 12/12/23  0529 12/11/23  1126 12/11/23  0550   HGB 7.5* 7.5* 8.5* 8.4*   < > 8.0*   MCV 89  --   --  87  --  86   WBC 4.4  --   --  4.8  --  4.8     --   --  245  --  204    < > = values in this interval not displayed.        Impression and Plan    Anemia.  On Plavix and Xarelto typically.  Currently these are being held and she is on a heparin drip.  Previously she had multiple polyps in the colon.  " This was done in 2019 and a 1 year follow-up was recommended.  She had red blood per rectum x 1 but has had no other evidence of acute bleeding.  Hemoglobin equivalent compared to yesterday.  EGD was done yesterday which showed some esophagitis and small hiatal hernia but no evidence of reason for blood loss.  Colonoscopy was aborted due to solid brown and green stool present.  No evidence of black or bloody stool on exam.  She was placed on schedule for today but did not take her colon preparation and therefore this was canceled.  She agrees to be rescheduled for tomorrow.  She is currently on at 12:15 PM.  Heparin will be held 4 hours prior.  MiraLAX and Gatorade prep were ordered without magnesium citrate.  If she is not clear in the morning she can be given magnesium citrate for 5 hours before procedure time.  If she has small polyps that are not the cause of bleeding these will be left at the time of colonoscopy.  Any larger polyps or polyps that could cause bleeding will be removed.  Any site of removal of polyps will be a potential bleeding site once she goes back on heparin.       30 minutes of total time was spent providing patient care including patient evaluation, reviewing documentation/test results, and .           Corey Hoffman MD  Thank you for the opportunity to participate in the care of this patient.   Please feel free to call me with any questions or concerns.  Phone number [phone].

## 2023-12-14 ENCOUNTER — ANESTHESIA (OUTPATIENT)
Dept: GASTROENTEROLOGY | Facility: CLINIC | Age: 63
DRG: 812 | End: 2023-12-14
Payer: COMMERCIAL

## 2023-12-14 ENCOUNTER — ANESTHESIA EVENT (OUTPATIENT)
Dept: GASTROENTEROLOGY | Facility: CLINIC | Age: 63
DRG: 812 | End: 2023-12-14
Payer: COMMERCIAL

## 2023-12-14 DIAGNOSIS — J44.9 CHRONIC OBSTRUCTIVE PULMONARY DISEASE, UNSPECIFIED COPD TYPE (H): ICD-10-CM

## 2023-12-14 LAB
ABO/RH(D): NORMAL
ANTIBODY SCREEN: NEGATIVE
APTT PPP: 59 SECONDS (ref 22–38)
APTT PPP: 83 SECONDS (ref 22–38)
BLD PROD TYP BPU: NORMAL
BLOOD COMPONENT TYPE: NORMAL
CODING SYSTEM: NORMAL
COLONOSCOPY: NORMAL
CROSSMATCH: NORMAL
HGB BLD-MCNC: 6.9 G/DL (ref 11.7–15.7)
HGB BLD-MCNC: 8.9 G/DL (ref 11.7–15.7)
ISSUE DATE AND TIME: NORMAL
SPECIMEN EXPIRATION DATE: NORMAL
UNIT ABO/RH: NORMAL
UNIT NUMBER: NORMAL
UNIT STATUS: NORMAL
UNIT TYPE ISBT: 600

## 2023-12-14 PROCEDURE — 88305 TISSUE EXAM BY PATHOLOGIST: CPT | Mod: TC | Performed by: INTERNAL MEDICINE

## 2023-12-14 PROCEDURE — 85730 THROMBOPLASTIN TIME PARTIAL: CPT | Performed by: INTERNAL MEDICINE

## 2023-12-14 PROCEDURE — P9016 RBC LEUKOCYTES REDUCED: HCPCS | Performed by: INTERNAL MEDICINE

## 2023-12-14 PROCEDURE — 120N000013 HC R&B IMCU

## 2023-12-14 PROCEDURE — 45385 COLONOSCOPY W/LESION REMOVAL: CPT | Performed by: INTERNAL MEDICINE

## 2023-12-14 PROCEDURE — 86923 COMPATIBILITY TEST ELECTRIC: CPT | Performed by: INTERNAL MEDICINE

## 2023-12-14 PROCEDURE — 250N000011 HC RX IP 250 OP 636: Performed by: NURSE ANESTHETIST, CERTIFIED REGISTERED

## 2023-12-14 PROCEDURE — 250N000013 HC RX MED GY IP 250 OP 250 PS 637: Performed by: INTERNAL MEDICINE

## 2023-12-14 PROCEDURE — 272N000104 HC DEVICE CLIP RESOLUTION, EACH: Performed by: INTERNAL MEDICINE

## 2023-12-14 PROCEDURE — 370N000017 HC ANESTHESIA TECHNICAL FEE, PER MIN: Performed by: INTERNAL MEDICINE

## 2023-12-14 PROCEDURE — 88305 TISSUE EXAM BY PATHOLOGIST: CPT | Mod: 26 | Performed by: PATHOLOGY

## 2023-12-14 PROCEDURE — 258N000003 HC RX IP 258 OP 636: Performed by: INTERNAL MEDICINE

## 2023-12-14 PROCEDURE — 86900 BLOOD TYPING SEROLOGIC ABO: CPT | Performed by: INTERNAL MEDICINE

## 2023-12-14 PROCEDURE — 85018 HEMOGLOBIN: CPT | Performed by: INTERNAL MEDICINE

## 2023-12-14 PROCEDURE — 0DBK8ZZ EXCISION OF ASCENDING COLON, VIA NATURAL OR ARTIFICIAL OPENING ENDOSCOPIC: ICD-10-PCS | Performed by: INTERNAL MEDICINE

## 2023-12-14 PROCEDURE — 250N000011 HC RX IP 250 OP 636: Mod: JZ | Performed by: INTERNAL MEDICINE

## 2023-12-14 PROCEDURE — 99231 SBSQ HOSP IP/OBS SF/LOW 25: CPT | Mod: 24 | Performed by: SURGERY

## 2023-12-14 PROCEDURE — 99233 SBSQ HOSP IP/OBS HIGH 50: CPT | Performed by: INTERNAL MEDICINE

## 2023-12-14 PROCEDURE — 999N000010 HC STATISTIC ANES STAT CODE-CRNA PER MINUTE: Performed by: INTERNAL MEDICINE

## 2023-12-14 PROCEDURE — 258N000003 HC RX IP 258 OP 636: Performed by: NURSE ANESTHETIST, CERTIFIED REGISTERED

## 2023-12-14 RX ORDER — ONDANSETRON 2 MG/ML
INJECTION INTRAMUSCULAR; INTRAVENOUS PRN
Status: DISCONTINUED | OUTPATIENT
Start: 2023-12-14 | End: 2023-12-14

## 2023-12-14 RX ORDER — SODIUM CHLORIDE, SODIUM LACTATE, POTASSIUM CHLORIDE, CALCIUM CHLORIDE 600; 310; 30; 20 MG/100ML; MG/100ML; MG/100ML; MG/100ML
INJECTION, SOLUTION INTRAVENOUS CONTINUOUS PRN
Status: DISCONTINUED | OUTPATIENT
Start: 2023-12-14 | End: 2023-12-14

## 2023-12-14 RX ORDER — PROPOFOL 10 MG/ML
INJECTION, EMULSION INTRAVENOUS PRN
Status: DISCONTINUED | OUTPATIENT
Start: 2023-12-14 | End: 2023-12-14

## 2023-12-14 RX ORDER — FLUMAZENIL 0.1 MG/ML
0.2 INJECTION, SOLUTION INTRAVENOUS
Status: ACTIVE | OUTPATIENT
Start: 2023-12-14 | End: 2023-12-15

## 2023-12-14 RX ORDER — PROPOFOL 10 MG/ML
INJECTION, EMULSION INTRAVENOUS CONTINUOUS PRN
Status: DISCONTINUED | OUTPATIENT
Start: 2023-12-14 | End: 2023-12-14

## 2023-12-14 RX ADMIN — ACETAMINOPHEN 1000 MG: 500 TABLET, FILM COATED ORAL at 20:29

## 2023-12-14 RX ADMIN — GABAPENTIN 100 MG: 100 CAPSULE ORAL at 21:38

## 2023-12-14 RX ADMIN — PROPOFOL 20 MG: 10 INJECTION, EMULSION INTRAVENOUS at 13:27

## 2023-12-14 RX ADMIN — OMEPRAZOLE 40 MG: 20 CAPSULE, DELAYED RELEASE ORAL at 06:50

## 2023-12-14 RX ADMIN — SODIUM CHLORIDE, POTASSIUM CHLORIDE, SODIUM LACTATE AND CALCIUM CHLORIDE: 600; 310; 30; 20 INJECTION, SOLUTION INTRAVENOUS at 13:27

## 2023-12-14 RX ADMIN — PROPOFOL 30 MG: 10 INJECTION, EMULSION INTRAVENOUS at 13:31

## 2023-12-14 RX ADMIN — LISINOPRIL 10 MG: 10 TABLET ORAL at 09:13

## 2023-12-14 RX ADMIN — EMPAGLIFLOZIN 10 MG: 10 TABLET, FILM COATED ORAL at 09:13

## 2023-12-14 RX ADMIN — FLUTICASONE FUROATE AND VILANTEROL TRIFENATATE 1 PUFF: 200; 25 POWDER RESPIRATORY (INHALATION) at 09:14

## 2023-12-14 RX ADMIN — GABAPENTIN 100 MG: 100 CAPSULE ORAL at 09:13

## 2023-12-14 RX ADMIN — NICOTINE 1 PATCH: 14 PATCH, EXTENDED RELEASE TRANSDERMAL at 09:12

## 2023-12-14 RX ADMIN — CARVEDILOL 6.25 MG: 6.25 TABLET, FILM COATED ORAL at 09:13

## 2023-12-14 RX ADMIN — GABAPENTIN 100 MG: 100 CAPSULE ORAL at 15:22

## 2023-12-14 RX ADMIN — ONDANSETRON 4 MG: 2 INJECTION INTRAMUSCULAR; INTRAVENOUS at 13:29

## 2023-12-14 RX ADMIN — FAMOTIDINE 20 MG: 10 INJECTION INTRAVENOUS at 09:13

## 2023-12-14 RX ADMIN — AMLODIPINE BESYLATE 2.5 MG: 2.5 TABLET ORAL at 09:13

## 2023-12-14 RX ADMIN — ACETAMINOPHEN 1000 MG: 500 TABLET, FILM COATED ORAL at 05:48

## 2023-12-14 RX ADMIN — OMEPRAZOLE 40 MG: 20 CAPSULE, DELAYED RELEASE ORAL at 15:44

## 2023-12-14 RX ADMIN — PROPOFOL 200 MCG/KG/MIN: 10 INJECTION, EMULSION INTRAVENOUS at 13:27

## 2023-12-14 RX ADMIN — DEXTROSE AND SODIUM CHLORIDE: 5; 900 INJECTION, SOLUTION INTRAVENOUS at 01:29

## 2023-12-14 RX ADMIN — ACETAMINOPHEN 1000 MG: 500 TABLET, FILM COATED ORAL at 15:22

## 2023-12-14 RX ADMIN — ROSUVASTATIN CALCIUM 40 MG: 20 TABLET, FILM COATED ORAL at 21:38

## 2023-12-14 RX ADMIN — DOCUSATE SODIUM 50 MG AND SENNOSIDES 8.6 MG 1 TABLET: 8.6; 5 TABLET, FILM COATED ORAL at 20:32

## 2023-12-14 ASSESSMENT — ACTIVITIES OF DAILY LIVING (ADL)
ADLS_ACUITY_SCORE: 22
ADLS_ACUITY_SCORE: 22
ADLS_ACUITY_SCORE: 24
ADLS_ACUITY_SCORE: 24
ADLS_ACUITY_SCORE: 22
ADLS_ACUITY_SCORE: 24
ADLS_ACUITY_SCORE: 22

## 2023-12-14 ASSESSMENT — ENCOUNTER SYMPTOMS: DYSRHYTHMIAS: 1

## 2023-12-14 ASSESSMENT — LIFESTYLE VARIABLES: TOBACCO_USE: 1

## 2023-12-14 ASSESSMENT — COPD QUESTIONNAIRES: COPD: 1

## 2023-12-14 NOTE — PLAN OF CARE
Neuro: A&O X4  Tele/cardiac: SR/SB  Respiration: On RA  Activity: SBA  Pain: Tylenol given for headache  Drips: RUE PICC infusing  IVF  Drains/tubes: none  Skin: rash to upper and lower back  GI/: NPO from midnight  Aggression color: green  Isolation: none  Plan:  completed bowel prep, colonoscopy scheduled for 1250 today, heparin to be stopped 4 hours prior   Hemoglobin 6.9, to get 1 unit today after type and screen is completed

## 2023-12-14 NOTE — PLAN OF CARE
A&O X4.VSS on RA, christel,hypertensive.TELE sinus christel.Lung Sounds diminished.Bowel Sounds active, loose stools.Voiding freely. Up SBA/IND.Pain controlled with scheduled meds. Clear liquid diet. Bowel prep started. Heparin infusing at 550u/hr next [id number]. D5NS infusing ay 75ml/hr, double lumen picc R arm. Hgb 7.1 recheck 2200.

## 2023-12-14 NOTE — PLAN OF CARE
Goal Outcome Evaluation:    Orientations: AOx4  Vitals/Pain: VSS, Pt on RA, denies pain   Tele: Sinus bradycardia   Lines/Drains: RUE double lumen PICC WDL- Heparin gtt infusing per eMAR, next PTT ordered for 2145  Skin/Wounds: Rash to upper and lower back   GI/: Pt voiding spontaneously w/out difficulty, tolerating diet very well, denies nausea   LS: Diminished   Labs: Abnormal/Trends, Electrolyte Replacement- 1 unit PRBC's infused from 0177-5241, hemoglobin re-check: 8.9. Q12 hemoglobin checks, next PTT ordered for 2145.   Ambulation/Assist: SBA  Sleep Quality: Fair

## 2023-12-14 NOTE — ANESTHESIA POSTPROCEDURE EVALUATION
Patient: Shirley Hendricks    Procedure: Procedure(s):  Colonoscopy       Anesthesia Type:  MAC    Note:  Disposition: Inpatient   Postop Pain Control: Uneventful            Sign Out: Well controlled pain   PONV: No   Neuro/Psych: Uneventful            Sign Out: Acceptable/Baseline neuro status   Airway/Respiratory: Uneventful            Sign Out: Acceptable/Baseline resp. status   CV/Hemodynamics: Uneventful            Sign Out: Acceptable CV status   Other NRE:    DID A NON-ROUTINE EVENT OCCUR? No           Last vitals:  Vitals Value Taken Time   /66 12/14/23 1420   Temp     Pulse 63 12/14/23 1448   Resp 10 12/14/23 1448   SpO2 99 % 12/14/23 1448   Vitals shown include unfiled device data.    Electronically Signed By: Nel Enrique  December 14, 2023  2:49 PM

## 2023-12-14 NOTE — ANESTHESIA PREPROCEDURE EVALUATION
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 clot    Post-menopausal     Reflux esophagitis 2004    EGD: esophagitis and medium HH    SBO (small bowel obstruction) (H) 08/10/2021    SVT (supraventricular tachycardia)     Thrombocytopenia (H24)     Uncomplicated asthma     Vitamin C deficiency 2018      Past Surgical History:   Procedure Laterality Date    ANGIOGRAM      ANGIOGRAM Right 2021    Procedure: RIGHT LOWER EXTREMITY ANGIOGRAM WITH LEFT BRACHIAL ARTERY CUTDOWN;  Surgeon: José Luis Hernandez MD;  Location:  OR    BIOPSY MASS NECK Right 10/11/2023    Procedure: Right Parotid Biopsy;  Surgeon: Randal Mendoza MD;  Location:  OR    BYPASS GRAFT FEMOROPOPLITEAL Right 2020    Procedure: RIGHT FEMORAL GRAFT TO ABOVE-KNEE POPLITEAL BYPASS USING CADAVERIC SUPERFICIAL FEMORAL ARTERY;  Surgeon: Chadwick Lozano MD;  Location: [facility]    BYPASS GRAFT FEMOROPOPLITEAL Right 2/15/2022    Procedure: RIGHT SUPERFICIAL FEMORAL ARTERY GRAFT TO BELOW KNEE POPLITEAL  BYPASS WITH CADAVERIC CRYOLIFE  FEMORAL-POPLITEAL ARTERY SIZE: OUTER DIAMETER: 7MM - 6MM;  Surgeon: Chadwick Lozano;  Location:  OR    BYPASS GRAFT FEMOROPOPLITEAL Right 5/26/2023    Procedure: RIGHT DISTAL SUPERFICIAL FEMORAL GRAFT TO ANTERIOR TIBIAL ARTERY WITH 26 CENTIMETER CADAVERIC SUPERFICIAL FEMORAL ARTERY GRAFT;  Surgeon: Chadwick Lozano MD;  Location:  OR    BYPASS GRAFT FEMOROPOPLITEAL Right 10/11/2023    Procedure: RIGHT FEMORAL TO POPLITEAL GRAFT THROMBECTOMY;  Surgeon: Chadwick Lozano MD;  Location:  OR    BYPASS GRAFT INSITU FEMOROPOPLITEAL Right 7/7/2021    Procedure: CREATION RIGHT FEMORAL TO POPLITEAL ARTERIAL BYPASS USING INSITU VEIN GRAFT;  Surgeon: José Luis Hernandez MD;  Location:  OR    CARDIAC CATHERIZATION  09/03/2009    multivessel CAD without target lesions, med mgmt indicated, preserved ef    CARPAL TUNNEL RELEASE RT/LT Right 05/20/2021    COLONOSCOPY N/A 12/12/2023    Procedure: Colonoscopy;  Surgeon: Corey Hoffman MD;  Location:  GI    CV CORONARY ANGIOGRAM N/A 10/4/2023    Procedure: Coronary Angiogram;  Surgeon: Rogelio Ricks MD;  Location:  HE[facility] CARD[facility] CATH LAB    CV PCI N/A 10/4/2023    Procedure: Percutaneous Coronary Intervention;  Surgeon: Rogelio Ricks MD;  Location:  HEART CARDIAC CATH LAB    EMBOLECTOMY LOWER EXTREMITY Right 10/6/2023    Procedure: Right anterior tibial bypass with graft, Right tibial endarterectomy,thrombectomy, Right doraslis pedis thrombectomy, Anterior Tibial vein patch.;  Surgeon: Chadwick Lozano MD;  Location:  OR    ENDARTERECTOMY CAROTID Right 05/11/2016    Procedure: ENDARTERECTOMY CAROTID;  Surgeon: Chadwick Lozano MD;  Location:  OR    ENDARTERECTOMY CAROTID Left 06/08/2020    Procedure: LEFT CAROTID ENDARTERECTOMY with distal facal vein patch  and EEG;  Surgeon: Chadwick Lozano MD;  Location:  OR    ESOPHAGOSCOPY, GASTROSCOPY, DUODENOSCOPY (EGD),  COMBINED N/A 12/12/2023    Procedure: Esophagoscopy, gastroscopy, duodenoscopy (EGD), combined;  Surgeon: Corey Hoffman MD;  Location:  GI    FINE NEEDLE ASPIRATION WITHOUT IMAGING GUIDANCE Right 9/22/2023    Procedure: Fine needle aspiration without imaging guidance;  Surgeon: Kiersten Aguilera MD;  Location:  GI    FLUORESCENCE INTRAOPERATIVE VASCULAR ANGIOGRAPHY Right 12/28/2022    Procedure: RIGHT LEG ANGIOGRAM with intervention;  Surgeon: Chadwick Lozano MD;  Location:  OR    GYN SURGERY  left tube    left salpingectomy    IR ANGIOGRAM THROUGH CATHETER (ARTERIAL)  10/6/2023    IR ANGIOGRAM THROUGH CATHETER (ARTERIAL)  10/6/2023    IR LOWER EXTREMITY ANGIOGRAM RIGHT  05/25/2021    IR LOWER EXTREMITY ANGIOGRAM RIGHT  10/5/2023    IR OR ANGIOGRAM  6/23/2021    IR OR ANGIOGRAM  12/28/2022    LAPAROSCOPIC CHOLECYSTECTOMY N/A 09/27/2017    Procedure: LAPAROSCOPIC CHOLECYSTECTOMY;  LAPAROSCOPIC CHOLECYSTECTOMY;  Surgeon: Jacoby Tapia MD;  Location: [facility]    LAPAROSCOPY DIAGNOSTIC (GENERAL) N/A 8/11/2021    Procedure: Exploratory laparoscopy,  laparoscopic lysis of adhesions, laparotomy;  Surgeon: Corina Ferreira MD;  Location:  OR    OR ANGIOGRAM, LOWER EXTREMITY Right 10/11/2023    Procedure: Or Angiogram, Lower Extremity;  Surgeon: Chadwick Lozano MD;  Location:  OR    ORTHOPEDIC SURGERY      left knee surgery    REPAIR ANEURYSM FEMORAL ARTERY Left 12/28/2022    Procedure: REPAIR LEFT FEMORAL PSEUDOANEURYSM;  Surgeon: Chadwick Lozano MD;  Location:  OR    VASCULAR SURGERY  aoto bi fem  left fem-AK pop    ZZC FABRIC WRAPPING OF ABDOMINAL ANEURYSM      [facility] NONSPECIFIC PROCEDURE  12/2000    angioplasty with stent right fem. a.    ZZC NONSPECIFIC PROCEDURE  1987    sinus surgery    ZZC NONSPECIFIC PROCEDURE  09/02/2009    Emergent left groin exploration with oversewing of bleeding angiographic site. 2. Endarterectomy of common femoral-proximal superficial  femoral artery with greater saphenous vein patch angioplasty.   a. [place] of accessory right greater saphenous vein.     [facility] NONSPECIFIC PROCEDURE  08/27/2009    occluded left common iliac and external iliac arteries were successfully revascularized with stenting to 8 and 7 mm       Allergies   Allergen Reactions    Pantoprazole      Protonix caused diffuse edema    Chantix [Varenicline]      Terrible dreams    Contrast Dye Unknown    Penicillins Itching and Rash      Social History     Tobacco Use    Smoking status: Some Days     Packs/day: 0.50     Years: 52.00     Additional pack years: 0.00     Total pack years: 26.00     Types: Cigarettes     Start date: 1968    Smokeless tobacco: Never    Tobacco comments:     1/2 PPD   Substance Use Topics    Alcohol use: Not Currently     Comment: x1-2 yr      Wt Readings from Last 1 Encounters:   12/12/23 50.8 kg (112 lb)        Anesthesia Evaluation   Pt has had prior anesthetic. Type: General.    No history of anesthetic complications       ROS/MED HX  ENT/Pulmonary:     (+)           allergic rhinitis,     tobacco use, Current use,    asthma    COPD,           (-) sleep apnea   Neurologic:    (-) no CVA and no TIA   Cardiovascular: Comment: svt    (+) Dyslipidemia hypertension- Peripheral Vascular Disease-  CAD - past MI - -   Taking blood thinners   CHF  Last EF: 30-35                dysrhythmias, Other,        Previous cardiac testing   Echo: Date: 10/3/23 Results:  Complete Portable Echo Adult. Optison (NDC #[id number]) given intravenously.  Technically difficult study.  ______________________________________________________________________________  Interpretation Summary     Technically difficult study with some improvement after contrast use.     Normal LV size and wall thickness with severely reduced LV systolic function.  Visually estimated LVEF is around 30 to 35%.  Mid to distal portions of the left ventricle are completely akinetic to  severely hypokinetic.  Basal segment contractility seems preserved. Findings  likely suggest stress-induced cardiomyopathy.  Normal RV size and systolic function.  No hemodynamically significant valve disease.  IVC is normal in size and collapsible.    Stress Test:  Date:  Results:  Name: KASIA WAN  MRN: [id number]  : 1960  Study Date: 2021 12:57 PM  Age: 60 yrs  Gender: Female  Patient Location: [facility]  Reason For Study: Light headedness  Ordering Physician: JACKIE DOWNEY  Referring Physician: Vasquez Benoit  Performed By: Corey James RDCS     BSA: 1.5 m2  Height: 62 in  Weight: 112 lb  HR: 57  BP: 140/78 mmHg  ______________________________________________________________________________  Procedure  Stress Echo Complete.     ______________________________________________________________________________  Interpretation Summary     Suboptimal stress test due to inadequate maximum heart rate.  Normal left ventricular function and wall motion at rest and post-stress but  LV size and function were unchanged following limited exercise of 3 minutes.  Post-exercise images were obtained with the heart rate in the 70's bpm.  Blood pressure seth appropriately from 140/78 mmHg (supine, baseline) to  158/80 mmHg during Stage I and fell to 134/74 mmHg in recovery. Consider  Lexiscan stress imaging (nuclear, MRI) for adequate testing.  ______________________________________________________________________________  Stress  Exercise was stopped due to fatigue.  There was a normal BP response to exercise.  Target Heart Rate was not achieved due to fatigue.  Suboptimal stress test due to inadequate maximum heart rate.  J point/ST elevation at rest pseudonormalized with exercise.  There was no arrhythmia.  The Duke treadmill score was intermediate risk ( -11< Sterling score <5).  Normal left ventricular function and wall motion at rest and post-stress.  The visual ejection fraction is estimated at 60-65%.     Baseline  The  patient is in normal sinus rhythm.  Baseline ECG displays Q waves in the mike-septal leads.  Elevated J point V3-V6, inferior leads - most C/W early repolarization.  Normal left ventricular function and wall motion at rest and post-stress.  The visual ejection fraction is estimated at 60-65%.     Stress Results         Protocol:  Eliezer Protocol        Maximum Predicted HR:   158 bpm         Target HR: 134 bpm               % Maximum Predicted HR: 66 %        Stage  DurationHeart Rate  BP                    Comment             (mm:ss)   (bpm)    Stage 1   3:00      104   158/80Patient requested to stop   RecoveryR  4:00      56    134/74RPP = 52235, Sterling = 2, FAC = Below average             Stress Duration:   3:00 mm:ss        Recovery Time: 4:00 mm:ss          Maximum Stress HR: 104 bpm           METS:          4     Mitral Valve  There is no mitral regurgitation noted.     Tricuspid Valve  No tricuspid regurgitation.     Aortic Valve  No aortic regurgitation is present. No hemodynamically significant valvular  aortic stenosis.  ______________________________________________________________________________  MMode/2D Measurements & Calculations  IVSd: 1.00 cm     LVIDd: 4.0 cm  LVIDs: 2.8 cm  LVPWd: 0.73 cm  ECG Reviewed:  Date: Results:    Cath:  Date: 10/4/23 Results:     Prox RCA lesion is 45% stenosed.     RPAV-1 lesion is 30% stenosed.     RPAV-2 lesion is 50% stenosed.     1st Mrg lesion is 45% stenosed.     1st Diag lesion is 100% stenosed.     Dist Cx lesion is 55% stenosed.     1. Right dominant coronary anatomy.  2. Completely occluded (probably chronic) D1 with left to left collaterals from distal LAD to D1.  3. Moderate coronary artery disease involving proximal to mid RCA which is not hemodynamically significant-IFR negative.  4. Moderate coronary disease involving distal small-caliber RPL and distal circumflex artery.   (-) valvular problems/murmurs   METS/Exercise Tolerance: 3 - Able to walk 1-2  blocks without stopping    Hematologic: Comments: Lupus    (+) History of blood clots,     anemia, history of blood transfusion,         Musculoskeletal: Comment: Charcot Breonna Tooth - neg musculoskeletal ROS     GI/Hepatic:     (+) GERD,                (-) liver disease   Renal/Genitourinary:    (-) renal disease   Endo:    (-) Type II DM, thyroid disease and obesity   Psychiatric/Substance Use:     (+) psychiatric history anxiety and depression   Recreational drug usage: Cannabis.    Infectious Disease:  - neg infectious disease ROS     Malignancy:  - neg malignancy ROS     Other:  - neg other ROS          Physical Exam    Airway        Mallampati: II   TM distance: > 3 FB   Neck ROM: full   Mouth opening: > 3 cm    Respiratory Devices and Support         Dental       (+) Modest Abnormalities - crowns, retainers, 1 or 2 missing teeth      Cardiovascular   cardiovascular exam normal          Pulmonary   pulmonary exam normal                OUTSIDE LABS:  CBC:   Lab Results   Component Value Date    WBC 4.4 12/13/2023    WBC 4.8 12/12/2023    HGB 6.9 (LL) 12/14/2023    HGB 7.4 (L) 12/13/2023    HCT 23.3 (L) 12/13/2023    HCT 25.6 (L) 12/12/2023     12/13/2023     12/12/2023     BMP:   Lab Results   Component Value Date     (L) 12/11/2023     (L) 12/10/2023    POTASSIUM 4.4 12/11/2023    POTASSIUM 4.5 12/10/2023    CHLORIDE 104 12/11/2023    CHLORIDE 100 12/10/2023    CO2 19 (L) 12/11/2023    CO2 20 (L) 12/10/2023    BUN 24.9 (H) 12/11/2023    BUN 23.4 (H) 12/10/2023    CR 0.87 12/11/2023    CR 0.79 12/10/2023    GLC 86 12/11/2023     (H) 12/10/2023     COAGS:   Lab Results   Component Value Date    PTT 83 (H) 12/14/2023    INR 1.38 (H) 10/07/2023    FIBR 118 (L) 10/07/2023     POC:   Lab Results   Component Value Date    BGM 87 07/08/2021     HEPATIC:   Lab Results   Component Value Date    ALBUMIN 2.9 (L) 10/07/2023    PROTTOTAL 4.3 (L) 10/07/2023    ALT 15 10/07/2023    AST 18  10/07/2023    ALKPHOS 35 10/07/2023    BILITOTAL 0.2 10/07/2023     OTHER:   Lab Results   Component Value Date    PH 7.27 (L) 10/04/2023    LACT <0.3 10/04/2023    A1C 5.3 02/15/2022    SONIA 8.3 (L) 12/11/2023    PHOS 4.0 07/09/2021    MAG 1.9 10/14/2023    LIPASE 132 08/10/2021    AMYLASE 46 08/14/2017    TSH 0.77 01/05/2021    T4 1.19 03/28/2017    CRP <2.9 09/18/2014    SED 39 (H) 11/13/2023       Anesthesia Plan    ASA Status:  3    NPO Status:  NPO Appropriate    Anesthesia Type: MAC.              Consents    Anesthesia Plan(s) and associated risks, benefits, and realistic alternatives discussed. Questions answered and patient/representative(s) expressed understanding.     - Discussed:     - Discussed with:  Patient            Postoperative Care       PONV prophylaxis: Ondansetron (or other 5HT-3)     Comments:               Nel Enrique    I have reviewed the pertinent notes and labs in the chart from the past 30 days and (re)examined the patient.  Any updates or changes from those notes are reflected in this note.

## 2023-12-14 NOTE — PROGRESS NOTES
VASCULAR SURGERY      Completed bowel prep.  No melanotic stools.  Hemoglobin= 6.9   previous hemoglobin 7.4.  Receiving 1 unit packed blood cells this morning.      Plan colonoscopy today.  Heparin on hold.    Normal CMS and warm right foot.  Good Doppler signals in AT.    Chadwick Lozano MD

## 2023-12-14 NOTE — PROGRESS NOTES
Patient is s/p colonoscopy.  Please see reports.  Was found to have multiple polyps, 1 of which was removed.  Okay to resume anticoagulation per GI.  Per GI they do not think that the source of bleeding is gastrointestinal tract.  No altered blood or blood in the colon to suggest small bowel as the culprit.  Continue to monitor serial hemoglobin.  Hematology already following, will ask them to weigh in the situation.  Noted he was plan for outpatient bone marrow on 12/19 however given above findings unclear if there would be a change in plan.  I have sent out a call to heme/onc on-call to discuss this.

## 2023-12-14 NOTE — ANESTHESIA CARE TRANSFER NOTE
Patient: Shirley Hendricks    Procedure: Procedure(s):  Colonoscopy       Diagnosis: Anemia [D64.9]  Diagnosis Additional Information: No value filed.    Anesthesia Type:   MAC     Note:    Oropharynx: oropharynx clear of all foreign objects and spontaneously breathing  Level of Consciousness: drowsy  Oxygen Supplementation: nasal cannula  Level of Supplemental Oxygen (L/min / FiO2): 3  Independent Airway: airway patency satisfactory and stable  Dentition: dentition unchanged  Vital Signs Stable: post-procedure vital signs reviewed and stable  Report to RN Given: handoff report given  Patient transferred to: PACU    Handoff Report: Identifed the Patient, Identified the Reponsible Provider, Reviewed the pertinent medical history, Discussed the surgical course, Reviewed Intra-OP anesthesia mangement and issues during anesthesia, Set expectations for post-procedure period and Allowed opportunity for questions and acknowledgement of understanding      Vitals:  Vitals Value Taken Time   BP     Temp     Pulse     Resp     SpO2         Electronically Signed By: NOELLE Lamb CRNA  December 14, 2023  2:05 PM

## 2023-12-14 NOTE — OR NURSING
Procedures - Endoscopy Inpatient [facility]  Procedure: Colonoscopy  Indications: Anemia  Therapies/Interventions: no active bleeding - removed polyp in   Ascending colon *used x2 Steris clips after removal of polyp* patient received card to take home verifying clip placement  Specimens: Collected Yes  Sedation: Monitored Anesthesia Care  Response to procedure: Tolerated well  SBAR Post procedure to: Alexis Laird RN Registered Nurse Since 12/14/2023     Patient transported via litter to and from [facility] by [facility].    Kiersten Feliz RN, BSN, CGRN  Endoscopy staff

## 2023-12-15 VITALS
BODY MASS INDEX: 21.14 KG/M2 | HEART RATE: 58 BPM | TEMPERATURE: 97.6 F | OXYGEN SATURATION: 98 % | SYSTOLIC BLOOD PRESSURE: 143 MMHG | DIASTOLIC BLOOD PRESSURE: 71 MMHG | RESPIRATION RATE: 17 BRPM | HEIGHT: 61 IN | WEIGHT: 112 LBS

## 2023-12-15 LAB
APTT PPP: 76 SECONDS (ref 22–38)
HGB BLD-MCNC: 8.4 G/DL (ref 11.7–15.7)
PATH REPORT.COMMENTS IMP SPEC: NORMAL
PATH REPORT.COMMENTS IMP SPEC: NORMAL
PATH REPORT.FINAL DX SPEC: NORMAL
PATH REPORT.GROSS SPEC: NORMAL
PATH REPORT.MICROSCOPIC SPEC OTHER STN: NORMAL
PATH REPORT.RELEVANT HX SPEC: NORMAL
PHOTO IMAGE: NORMAL

## 2023-12-15 PROCEDURE — 85730 THROMBOPLASTIN TIME PARTIAL: CPT | Performed by: INTERNAL MEDICINE

## 2023-12-15 PROCEDURE — 250N000013 HC RX MED GY IP 250 OP 250 PS 637: Performed by: INTERNAL MEDICINE

## 2023-12-15 PROCEDURE — 999N000040 HC STATISTIC CONSULT NO CHARGE VASC ACCESS

## 2023-12-15 PROCEDURE — 250N000011 HC RX IP 250 OP 636: Mod: JZ | Performed by: INTERNAL MEDICINE

## 2023-12-15 PROCEDURE — 85018 HEMOGLOBIN: CPT | Performed by: INTERNAL MEDICINE

## 2023-12-15 PROCEDURE — 99231 SBSQ HOSP IP/OBS SF/LOW 25: CPT | Mod: 24 | Performed by: SURGERY

## 2023-12-15 RX ORDER — FLUTICASONE FUROATE AND VILANTEROL TRIFENATATE 200; 25 UG/1; UG/1
POWDER RESPIRATORY (INHALATION)
Qty: 120 EACH | Refills: 11 | Status: ON HOLD | OUTPATIENT
Start: 2023-12-15 | End: 2024-06-07

## 2023-12-15 RX ORDER — METHYLPREDNISOLONE SODIUM SUCCINATE 125 MG/2ML
125 INJECTION, POWDER, LYOPHILIZED, FOR SOLUTION INTRAMUSCULAR; INTRAVENOUS
Status: DISCONTINUED | OUTPATIENT
Start: 2023-12-15 | End: 2023-12-15 | Stop reason: HOSPADM

## 2023-12-15 RX ORDER — CLOPIDOGREL BISULFATE 75 MG/1
75 TABLET ORAL DAILY
Status: DISCONTINUED | OUTPATIENT
Start: 2023-12-15 | End: 2023-12-15 | Stop reason: HOSPADM

## 2023-12-15 RX ORDER — DIPHENHYDRAMINE HYDROCHLORIDE 50 MG/ML
50 INJECTION INTRAMUSCULAR; INTRAVENOUS
Status: DISCONTINUED | OUTPATIENT
Start: 2023-12-15 | End: 2023-12-15 | Stop reason: HOSPADM

## 2023-12-15 RX ADMIN — AMLODIPINE BESYLATE 2.5 MG: 2.5 TABLET ORAL at 08:15

## 2023-12-15 RX ADMIN — LISINOPRIL 10 MG: 10 TABLET ORAL at 08:15

## 2023-12-15 RX ADMIN — ACETAMINOPHEN 1000 MG: 500 TABLET, FILM COATED ORAL at 06:27

## 2023-12-15 RX ADMIN — NICOTINE 1 PATCH: 14 PATCH, EXTENDED RELEASE TRANSDERMAL at 08:27

## 2023-12-15 RX ADMIN — RIVAROXABAN 15 MG: 15 TABLET, FILM COATED ORAL at 14:12

## 2023-12-15 RX ADMIN — FLUTICASONE FUROATE AND VILANTEROL TRIFENATATE 1 PUFF: 200; 25 POWDER RESPIRATORY (INHALATION) at 08:22

## 2023-12-15 RX ADMIN — EMPAGLIFLOZIN 10 MG: 10 TABLET, FILM COATED ORAL at 08:15

## 2023-12-15 RX ADMIN — CARVEDILOL 6.25 MG: 6.25 TABLET, FILM COATED ORAL at 08:15

## 2023-12-15 RX ADMIN — GABAPENTIN 100 MG: 100 CAPSULE ORAL at 08:15

## 2023-12-15 RX ADMIN — OMEPRAZOLE 40 MG: 20 CAPSULE, DELAYED RELEASE ORAL at 06:27

## 2023-12-15 RX ADMIN — DOCUSATE SODIUM 50 MG AND SENNOSIDES 8.6 MG 1 TABLET: 8.6; 5 TABLET, FILM COATED ORAL at 08:15

## 2023-12-15 RX ADMIN — FAMOTIDINE 20 MG: 10 INJECTION INTRAVENOUS at 08:14

## 2023-12-15 RX ADMIN — CLOPIDOGREL BISULFATE 75 MG: 75 TABLET ORAL at 10:05

## 2023-12-15 RX ADMIN — FERROUS SULFATE TAB 325 MG (65 MG ELEMENTAL FE) 325 MG: 325 (65 FE) TAB at 08:15

## 2023-12-15 ASSESSMENT — ACTIVITIES OF DAILY LIVING (ADL)
ADLS_ACUITY_SCORE: 22

## 2023-12-15 NOTE — PLAN OF CARE
Neuro: A&O X4  Tele/cardiac: SR/SB  Respiration: On RA  Activity: SBA  Pain: Tylenol given for headache  Drips: RUE PICC infusing  heparin  Drains/tubes: none  Skin: rash to upper and lower back  GI/: continent  Aggression color: green  Isolation: none  Hemoglobin 8.4

## 2023-12-15 NOTE — PLAN OF CARE
Orientations: A&OX4  Vitals/Pain: VSS on RA except HTN  Tele: SB/SR  Lines/Drains: PICC removed before discharge  Skin/Wounds: Rash to upper & lower back  GI/: Adequate uop. +BS, +flatus, -BM. Tolerating regular diet. Denies nausea.  Labs: Abnormal/Trends, Electrolyte Replacement- Hgb stable at 8.4  Ambulation/Assist: Independent in room  Plan: Patient discharged back to home. All discharge education reviewed with pt bedside & medications handed to pt prior to discharge

## 2023-12-15 NOTE — DISCHARGE SUMMARY
[facility]    Discharge Summary  Hospitalist    Date of Admission:  12/10/2023  Date of Discharge:  12/15/2023  Discharging Provider: Vadim Ramirez MD, MD    Discharge Diagnoses     Acute on chronic possible blood loss anemia while on anticoagulation with Plavix and Xarelto.  Rule out GI bleed  Nonsevere reflux esophagitis  History of GERD  S/p recent emergent right femoral to anterior tibial PTFE graft thrombectomy, anterior tibial thrombectomy intraoperative angiography by vascular surgery on 10/11/2023.  History of peripheral artery disease status post bilateral lower extremity bypass grafting.  History of bilateral carotid endarterectomy.  History of Takotsubo cardiomyopathy with EF of 30 to 35%  COPD  History of marginal zone lymphoma    Hospital Course:    63-year-old female who was recently hospitalized from 10/3-10/14/23 for acute occlusion of her RLE PTFE Graft S/p embolectomy and thromectomy by vascular surgery. Patient received 2 units of PRBCs intraoperatively.  Hemoglobin was 7.2 prior to procedure and during that hospitalization hemoglobins stable around 9 g/dL. She presented to the emergency room on 12/10/2023 with complaints of worsening right lower extremity pain and dyspnea on exertion and was found to be anemic with hemoglobin of 6. Her occult stool blood testing returned positive.  She was admitted for further evaluation of acute on chronic anemia.     Acute on chronic possible blood loss anemia while on anticoagulation with Plavix and Xarelto.  Rule out GI bleed  Nonsevere reflux esophagitis  History of GERD  -Patient presents with a hemoglobin of 6.0, her previous hemoglobin on 11/13/2023 was 10.5  -Iron studies within normal limits, B12 also normal.  Normal LDH and haptoglobin argues against hemolytic process.  -Patient was admitted with concern for acute blood loss anemia most likely from GI source although patient denied hematochezia or melena or bright red  bleeding  -Altogether received 3 units of PRBC since admission  -Clinically there was no evidence of GI bleeding.  -[facility] was consulted, patient underwent upper GI endoscopy on 1212 and colonoscopy on 12/14.  Upper GI endoscopy showed nonsevere reflux esophagitis with no evidence of bleeding.  Colonoscopy yesterday showed multiple polyps including one 9 mm polyp which was removed with a cold snare and placed.  Colon was however inadequately prepped with stool in the entire colon, small lesions could be missed.  Discussed with GI after colonoscopy, given that there was no residual blood in the colon, he did not think that there was high concern for GI bleeding.  He did not recommend any additional GI workup at this time.  -Subsequent hemoglobin is stable at 8.4  -Also discussed with Dr. Barrow from [facility], patient is already scheduled for a bilateral biopsy on 12/19 as outpatient.  It is conceivable given her history of lymphoma that the anemia could be related to bone marrow.  They also do not have any contraindication for anticoagulation at this time.  -Patient continued on heparin drip overnight, she will be transitioned to Xarelto later this afternoon.  Dr. Lozano from vascular surgery recommends decreasing the dose of Xarelto from 20 mg to 15 mg daily.  Prior to admission Plavix also resumed.  I recommend getting CBC through PCP within a week  -Continue preadmission PPI  -Follow-up closely with hematology/oncology as outpatient.     S/p recent emergent right femoral to anterior tibial PTFE graft thrombectomy, anterior tibial thrombectomy intraoperative angiography by vascular surgery on 10/11/2023.  History of peripheral artery disease status post bilateral lower extremity bypass grafting.  History of bilateral carotid endarterectomy.  -As noted above patient underwent emergent graft thrombectomy during recent hospitalization and October   -Current ultrasound showed bilateral lower extremity  bypass grafts are patent without any stenosis  -Vascular surgery consulted  -Prior to admission Xarelto and Plavix on hold due to concerns for bleeding however, patient was started on low intensity heparin drip due to her extensive history of peripheral arterial disease and recent thrombosis of the graft needing embolectomy and thrombectomy  -As mentioned above, patient will be transitioned from heparin drip to Xarelto today and discharged home later     History of Takotsubo cardiomyopathy with EF of 30 to 35%  -Patient had recent coronary angiogram on 10/4/2023 which showed no new lesions  -Continue PTA Coreg, Norvasc, statin  -Continue prior to admission lisinopril  -Continue prior to admission Jardiance     COPD  -PTA regimen includes Leelee-Ellipta. Well controlled.   -Continue PTA inhaler  -Respiratory status stable     History of marginal zone lymphoma  -Oncology following    Vadim Ramirez MD, MD    Significant Results and Procedures   See below    Pending Results     Unresulted Labs Ordered in the Past 30 Days of this Admission       Date and Time Order Name Status Description    12/14/2023  1:53 PM Surgical Pathology Exam In process             Code Status   Full Code       Primary Care Physician   Vasquez Benoit    Physical Exam   Temp: 97.6  F (36.4  C) Temp src: Axillary BP: 132/66 Pulse: 62   Resp: 17 SpO2: 97 %        Constitutional: AAOX3, NAD  Respiratory: CTA B/L, Normal WOB  Cardiovascular: RRR, No murmur  GI: Soft, Non- tender, BS- normoactive  Neuro: CN- grossly intact, Moving all 4 extremities.     Discharge Disposition   Discharged to home  Condition at discharge: Stable    Consultations This Hospital Stay   PHARMACY IP CONSULT  GASTROENTEROLOGY IP CONSULT  VASCULAR SURGERY IP CONSULT  VASCULAR ACCESS ADULT IP CONSULT  HEMATOLOGY & ONCOLOGY IP CONSULT  CARE MANAGEMENT / SOCIAL WORK IP CONSULT  VASCULAR ACCESS ADULT IP CONSULT    Time Spent on this Encounter   I, Vadim Ramirez MD, personally  saw the patient today and spent greater than 30 minutes discharging this patient.    Discharge Orders      Medication Therapy Management Referral      Reason for your hospital stay    Acute on chronic anemia     Follow-up and recommended labs and tests     Follow up with primary care provider, Vasquez Benoit, within 7 days for hospital follow- up.  The following labs/tests are recommended: CBC in 1 week.     Activity    Your activity upon discharge: activity as tolerated     Diet    Follow this diet upon discharge: Orders Placed This Encounter      Snacks/Supplements Pediatric: Ensure Clear; Between Meals      Regular Diet Adult     Discharge Medications   Current Discharge Medication List        CONTINUE these medications which have CHANGED    Details   rivaroxaban ANTICOAGULANT (XARELTO) 15 MG TABS tablet Take 1 tablet (15 mg) by mouth daily (with dinner) for 60 days  Qty: 30 tablet, Refills: 1    Comments: Future refills by PCP Dr. Vasquez Benoit with phone number [phone].  Associated Diagnoses: Peripheral vascular disease (H24)           CONTINUE these medications which have NOT CHANGED    Details   acetaminophen (TYLENOL) 500 MG tablet Take 500-1,000 mg by mouth every 6 hours as needed for mild pain      amLODIPine (NORVASC) 2.5 MG tablet Take 1 tablet (2.5 mg) by mouth daily  Qty: 30 tablet, Refills: 4    Associated Diagnoses: Coronary artery disease involving native coronary artery of native heart without angina pectoris; Essential hypertension      augmented betamethasone dipropionate (DIPROLENE AF) 0.05 % external cream Apply topically 2 times daily as needed (skin rash)  Qty: 50 g, Refills: 2    Associated Diagnoses: Dermatitis      BREO ELLIPTA 200-25 MCG/ACT inhaler Inhale 1 puff into the lungs daily  Qty: 120 each, Refills: 11    Associated Diagnoses: Chronic obstructive pulmonary disease, unspecified COPD type (H)      Calcium Carb-Cholecalciferol (CALCIUM CARBONATE-VITAMIN D3) 600-400 MG-UNIT TABS  TAKE ONE TABLET BY MOUTH TWICE DAILY  Qty: 180 tablet, Refills: 3    Associated Diagnoses: Osteoporosis, unspecified osteoporosis type, unspecified pathological fracture presence      carvedilol (COREG) 6.25 MG tablet Take 1 tablet (6.25 mg) by mouth 2 times daily (with meals)  Qty: 60 tablet, Refills: 11    Associated Diagnoses: Takotsubo cardiomyopathy      clopidogrel (PLAVIX) 75 MG tablet Take 1 tablet (75 mg) by mouth daily  Qty: 90 tablet, Refills: 3    Associated Diagnoses: Peripheral vascular disease (H24)      diphenhydrAMINE (BENADRYL) 25 MG tablet take Benadryl 25-50 mg one hour prior to the procedure  Qty: 2 tablet, Refills: 0    Associated Diagnoses: Contrast media allergy      empagliflozin (JARDIANCE) 10 MG TABS tablet Take 1 tablet (10 mg) by mouth daily  Qty: 90 tablet, Refills: 1    Associated Diagnoses: Type 2 diabetes mellitus with diabetic peripheral angiopathy without gangrene, without long-term current use of insulin (H)      ferrous sulfate (FEROSUL) 325 (65 Fe) MG tablet Take 1 tablet (325 mg) by mouth every other day  Qty: 60 tablet, Refills: 1    Associated Diagnoses: Atherosclerosis of bypass graft of right lower extremity with other clinical manifestation (H24)      gabapentin (NEURONTIN) 100 MG capsule Take 1 capsule (100 mg) by mouth 3 times daily  Qty: 270 capsule, Refills: 3    Associated Diagnoses: PAD (peripheral artery disease) (H24)      lisinopril (ZESTRIL) 10 MG tablet Take 1 tablet (10 mg) by mouth daily  Qty: 30 tablet, Refills: 4    Associated Diagnoses: Takotsubo cardiomyopathy; Coronary artery disease involving native coronary artery of native heart without angina pectoris      nitroGLYcerin (NITROSTAT) 0.4 MG sublingual tablet Place 1 tablet (0.4 mg) under the tongue See Admin Instructions for chest pain  Qty: 30 tablet, Refills: 11    Associated Diagnoses: Coronary artery disease involving native coronary artery of native heart without angina pectoris      omeprazole  (PRILOSEC) 20 MG DR capsule TAKE ONE CAPSULE BY MOUTH DAILY  Qty: 90 capsule, Refills: 3    Associated Diagnoses: Gastroesophageal reflux disease with esophagitis without hemorrhage      polyethylene glycol (MIRALAX) 17 GM/Dose powder Take 17 g by mouth daily Hold for diarrhea or loose stools  Qty: 510 g, Refills: 0    Associated Diagnoses: Constipation, unspecified constipation type      rosuvastatin (CRESTOR) 40 MG tablet Take 1 tablet (40 mg) by mouth At Bedtime  Qty: 90 tablet, Refills: 3    Associated Diagnoses: Hyperlipidemia LDL goal <70      !! senna-docusate (SENOKOT-S/PERICOLACE) 8.6-50 MG tablet Take 1 tablet by mouth 2 times daily      !! senna-docusate (SENOKOT-S/PERICOLACE) 8.6-50 MG tablet Take 1 tablet by mouth daily as needed for constipation  Qty: 30 tablet, Refills: 0    Associated Diagnoses: Atherosclerosis of bypass graft of right lower extremity with other clinical manifestation (H24)      triamcinolone (KENALOG) 0.1 % external ointment Apply topically 2 times daily as needed for irritation Apply to back      nicotine (NICODERM CQ) 14 MG/24HR 24 hr patch Place 1 patch onto the skin daily  Qty: 30 patch, Refills: 1    Associated Diagnoses: Tobacco use disorder       !! - Potential duplicate medications found. Please discuss with provider.        Allergies   Allergies   Allergen Reactions    Pantoprazole      Protonix caused diffuse edema    Chantix [Varenicline]      Terrible dreams    Contrast Dye Unknown    Penicillins Itching and Rash     Data   Most Recent 3 CBC's:  Recent Labs   Lab Test 12/15/23  0526 12/14/23  1530 12/14/23  0547 12/13/23  1444 12/13/23  0527 12/12/23  1509 12/12/23  0529 12/11/23  1126 12/11/23  0550   WBC  --   --   --   --  4.4  --  4.8  --  4.8   HGB 8.4* 8.9* 6.9*   < > 7.5*   < > 8.4*   < > 8.0*   MCV  --   --   --   --  89  --  87  --  86   PLT  --   --   --   --  206  --  245  --  204    < > = values in this interval not displayed.      Most Recent 3  BMP's:  Recent Labs   Lab Test 12/11/23  0550 12/10/23  2202 12/10/23  1635 10/14/23  0631 10/13/23  0607   *  --  131*  --  136   POTASSIUM 4.4  --  4.5 4.1 4.0   CHLORIDE 104  --  100  --  101   CO2 19*  --  20*  --  24   BUN 24.9*  --  23.4*  --  11.4   CR 0.87  --  0.79 0.88 0.92   ANIONGAP 10  --  11  --  11   SONIA 8.3*  --  8.9  --  8.4*   GLC 86 119* 94  --  112*     Most Recent 2 LFT's:  Recent Labs   Lab Test 10/07/23  1950 10/03/23  0737   AST 18 25   ALT 15 22   ALKPHOS 35 82   BILITOTAL 0.2 0.9     Most Recent INR's and Anticoagulation Dosing History:  Anticoagulation Dose History  More data exists         Latest Ref Rng & Units 4/21/2016 5/10/2016 9/24/2020 5/17/2023 10/5/2023 10/6/2023 10/7/2023   Recent Dosing and Labs   INR 0.85 - 1.15 1.02  0.95  1.01  0.94  0.95  1.08  1.38      Most Recent 3 Troponin's:  Recent Labs   Lab Test 06/10/20  1243 02/16/20  1824 09/18/19  0739   TROPI <0.015 <0.015 <0.015     Most Recent Cholesterol Panel:  Recent Labs   Lab Test 10/03/23  0941   CHOL 137   LDL 69   HDL 54   TRIG 68     Most Recent 6 Bacteria Isolates From Any Culture (See EPIC Reports for Culture Details):No lab results found.  Most Recent TSH, T4 and A1c Labs:  Recent Labs   Lab Test 02/15/22  0632 01/05/21  1119 02/04/19  0934 03/28/17  0922   TSH  --  0.77   < > 1.37   T4  --   --   --  1.19   A1C 5.3  --    < >  --     < > = values in this interval not displayed.       Results for orders placed or performed during the hospital encounter of 12/10/23   US Lower Extremity Arterial Duplex Bilateral    Narrative    EXAM: US LOWER EXTREMITY ARTERIAL DUPLEX BILATERAL  LOCATION: [facility]  DATE: 12/10/2023    INDICATION: R>L pain, chronic severe PAD  COMPARISON: Ultrasound 10/04/2023  TECHNIQUE: Duplex utilizing 2D gray-scale imaging, Doppler interrogation with color-flow and spectral waveform analysis.    FINDINGS:     RIGHT - Status post dnfcvex-qw-xeypktlgo artery bypass  graft with subsequent distal jump graft to the tibioperoneal trunk. The distinct grafts are not imaged separately but appear grossly patent. Waveforms are diffusely monophasic with some occasional   blunting. Anterior tibial and dorsalis pedis arteries are patent on today's exam, new in comparison to ultrasound on 10/04/2023.    LEFT - Status post rbkeasl-fx-doshfvzqu artery bypass graft appears which patent with increased velocities at both the proximal and distal anastomoses. The arteries at and below the knee are patent, although waveforms are largely monophasic. Dorsalis   pedis artery is noted to be biphasic.    RIGHT LOWER EXTREMITY ARTERIAL ASSESSMENT:  Common femoral artery: 63/14 cm/s  Proximal graft: 82/16 cm/s  Mid graft: 43/5 cm/s  Distal graft: 36/9 cm/s  Anterior tibial artery: 139/8 cm/s  Posterior tibial artery: Occluded.  Dorsalis pedis artery: 73/8 cm/s  Peroneal artery: 21 cm/s, although flow appears to be reversed    LEFT LOWER EXTREMITY ARTERIAL ASSESSMENT:  Common femoral artery: 46/7 cm/s  Proximal graft: 171/17 cm/s  Mid graft: 111/7 cm/s  Distal graft: 209/6 cm/s  SFA (distal): 143/0 cm/s  Popliteal artery (proximal): 165/0 cm/s  Popliteal artery (distal): 138/11 cm/s  Anterior tibial artery: 76/0 cm/s  Posterior tibial artery: 135/0 cm/s  Dorsalis pedis artery: 42/0 cm/s  Peroneal artery:  48/9 cm/s      Impression    IMPRESSION:  1.  Patent bilateral vascular arterial grafts as above without evidence of critical stenosis or occlusion.     XR Chest 2 Views    Narrative    EXAM: XR CHEST 2 VIEWS  LOCATION: [facility]  DATE: 12/10/2023    INDICATION: dyspnea  COMPARISON: Chest radiograph 12/09/2022, CT 10/30/2023      Impression    IMPRESSION: No evidence of acute cardiopulmonary disease. Cholecystectomy.     *Note: Due to a large number of results and/or encounters for the requested time period, some results have not been displayed. A complete set of results can be  found in Results Review.

## 2023-12-15 NOTE — PROGRESS NOTES
Vascular Surgery Progress Note    S: Doing well this morning.  Tolerating regular diet.    O: Completed colonoscopy yesterday.  Several polyps removed.  No obvious source of bleeding on this or EGD.  Vitals:  BP  Min: 90/58  Max: 172/74  Temp  Av.1  F (36.7  C)  Min: 97.5  F (36.4  C)  Max: 98.6  F (37  C)  Pulse  Av.2  Min: 49  Max: 74  I/O last 3 completed shifts:  In: 630 [P.O.:480; I.V.:150]  Out: -     Physical Exam: Alert and appropriate.  Very comfortable.   Both feet warm and pink with normal CMS   Excellent Doppler signals right left AT/DP's    AM Hgb= 8.4      Assessment/Plan: #1.  Acute blood loss anemia.  Apparently source is unclear.  Completed EGD and colonoscopy with polypectomy.  GI says we can resume regulation    #2.  Fortunately bypass graft particular the right is remain patent.  Will restart Plavix 75 mg daily.  Previously on Xarelto 20 mg daily but will slightly decrease the dose until seen in the clinic to 15 mg daily and this has been discussed with patient.    She does have a follow-up with me in the clinic next week where she is scheduled to have a duplex of both carotid arteries and the right leg bypass graft and will discuss anticoagulation at that time.    Discussed with hospitalist and patient.  Likely home today.      Wm. Blake MD

## 2023-12-17 ENCOUNTER — PATIENT OUTREACH (OUTPATIENT)
Dept: CARE COORDINATION | Facility: CLINIC | Age: 63
End: 2023-12-17
Payer: COMMERCIAL

## 2023-12-17 NOTE — PROGRESS NOTES
"  [facility]: [facility]: Post-Discharge Note  SITUATION                                                      Admission:    Admission Date: 12/10/23   Reason for Admission: 1. Acute on chronic anemia  D64.9       2. Occult blood positive stool  R19.5       3. Elevated troponin  R79.89       4. Anticoagulated  Z79.01  Discharge:   Discharge Date: 12/15/23  Discharge Diagnosis: Acute on chronic possible blood loss anemia while on anticoagulation with Plavix and Xarelto.  Rule out GI bleed  Nonsevere reflux esophagitis  History of GERD  S/p recent emergent right femoral to anterior tibial PTFE graft thrombectomy, anterior tibial thrombectomy intraoperative angiography by vascular surgery on 10/11/2023.  History of peripheral artery disease status post bilateral lower extremity bypass grafting.  History of bilateral carotid endarterectomy.  History of Takotsubo cardiomyopathy with EF of 30 to 35%  COPD  History of marginal zone lymphoma    BACKGROUND                                                      Per hospital discharge summary and inpatient provider notes:    Shirley Hendricks is a 63 year old female with extensive history of peripheral artery disease most recently status post PTFE graft from the right common femoral to mid anterior tibial artery on Xarelto (no missed doses) and with history of Takotsubo cardiomyopathy with recovered EF who presents with a friend for evaluation of leg pain.  She has had 1 week of pain in her right lower extremity, primarily in the posterior thigh.  Initially it was just with walking but is now at rest, currently 7 out of 10. She does not have change in her chronic numbness.  She has not had chest pain but does have new dyspnea on exertion starting this week.  There is no fever, vomiting, and only her \"normal\" cough.  She has been using Tylenol for pain.  She contacted vascular surgery who recommended she be seen in the emergency department.     Of " "note, she started iron today because she thought it might help with her \"sluggishness\".  She has not seen black stools.  Once recently she saw a little bit of pink in her stool.      ASSESSMENT           Discharge Assessment  How are you doing now that you are home?: Patient states she is doing good. States she just got out of the shower so she is doing great.  How are your symptoms? (Red Flag symptoms escalate to triage hotline per guidelines): Improved  Do you feel your condition is stable enough to be safe at home until your provider visit?: Yes  Does the patient have their discharge instructions? : Yes  Does the patient have questions regarding their discharge instructions? : No  Were you started on any new medications or were there changes to any of your previous medications? : Yes  Does the patient have all of their medications?: Yes  Do you have questions regarding any of your medications? : No  Do you have all of your needed medical supplies or equipment (DME)?  (i.e. oxygen tank, CPAP, cane, etc.): Yes  Discharge follow-up appointment scheduled within 14 calendar days? : Yes  Discharge Follow Up Appointment Date: 12/22/23  Discharge Follow Up Appointment Scheduled with?: Primary Care Provider         Post-op (Clinicians Only)  Did the patient have surgery or a procedure: Yes        PLAN                                                      Outpatient Plan:  Follow up with primary care provider, Vasquez Benoit, within 7 days for hospital follow- up.  The following labs/tests are recommended: CBC in 1 week.      Future Appointments   Date Time Provider Department Center   12/21/2023 12:30 PM [facility]   [facility]   12/21/2023  2:00 PM Chadwick Lozano MD [facility]   12/22/2023 11:00 AM Vasquez Benoit MD [facility]   3/28/2024  2:00 PM Jacoby Fischer MD [facility]         For any urgent concerns, please contact our 24 hour nurse triage line: [phone] " ([phone])         Jo Ann Hernandez RN

## 2023-12-19 ENCOUNTER — TELEPHONE (OUTPATIENT)
Dept: INTERNAL MEDICINE | Facility: CLINIC | Age: 63
End: 2023-12-19
Payer: COMMERCIAL

## 2023-12-19 NOTE — TELEPHONE ENCOUNTER
MTM referral from: Transitions of Care (recent hospital discharge or ED visit)    MTM referral outreach attempt #2 on December 19, 2023 at 1:51 PM      Outcome: Patient not reachable after several attempts, will route to [facility] Pharmacist/Provider as an FYI.  [facility] scheduling number is .  Thank you for the referral.    Use  [employer] for the carrier/Plan on the flowsheet      [employer] Message Sent    Ailin Sutherland CPhT  [facility]

## 2023-12-22 NOTE — PROGRESS NOTES
[place] [facility]     Shirley Hendricks has a long history of vascular issues.  Previous aortobifemoral bypass graft (3/19/2010) and left femoral to popliteal in situ bypass graft (3/26/2010) which has worked well.  Had multiple procedures on her right leg.  Most recently right common femoral to proximal anterior tibial PTFE bypass graft with distal graft going into previous cadaveric SFA graft-performed 10/11/2023.     She been hospitalized for GI bleeding.  Chronic Xarelto and Plavix held.  Placed on IV heparin with extensive workup including EGD and colonoscopy which was unremarkable.  Discharged again on Xarelto 15 mg daily along with Plavix on 12/15/2023.        -- 5/11/2016: Right CEA with distal facial vein patch for symptomatic stenosis  --6/8/2020: Left CEA with distal facial vein patch for symptomatic stenosis.        ROS: Has done well since discharge.  No further bleeding.  Workup for B-cell lymphoma is still in process.  Very likely she will receive chemotherapy.  She did see orthopedics about her Charcot Breonna tooth foot issues and they had recommended surgery but she is definitely not interested in the foot surgery at this time.    No issues with her legs.  Both grafts are easily palpable and she checks this on a daily basis.    Exam: Alert and appropriate.  Ambulatory.  Very pleasant and talkative.     Blood pressure 151/74 left arm.  Pulse 69 regular.   HEENT= well-healed bilateral carotid incisions   Chest= clear   Cardiovascular= regular rate   +3 pulse in the right PTFE ringed graft and left in situ graft.   I cannot palpate the right DP pulse with a +2 left DP pulse   Biphasic flow in the right graft and both anterior tibial arteries distally.            Duplex reveals a widely patent PTFE bypass graft.  Both proximally and distally there is a short segment of her SFA cadaveric artery graft which should improve compliance of the stiffer PTFE graft going to her small anterior  tibial artery.        Carotid duplex today:  Reveals a widely patent left CEA.  There is a stable narrowing of the right carotid bulb.  PSV= 176 cm/s compared to 147 cm/s on 12/22/2022.          IMPRESSION:     #1.  Patent composite cadaveric SFA/PTFE femoral to anterior tibial bypass graft with adequate flow.  Will keep her on the lower dose Xarelto 15 mg daily along with Plavix to help keep this PTFE graft patent.  Recheck duplex in 4 months.    #2.  Clinically widely patent left femoral to popliteal in situ bypass graft.  Recent duplex during hospitalization also confirm patency.  Reevaluation in 1 year.    #3.  Both CEA's patent.  Stable stenosis of the right carotid bulb.  Yearly carotid duplex.    #4.  Acute blood loss anemia of no specific etiology.  Being followed by GI medicine considering [facility].    #5.  B-cell lymphoma--see above    25 minutes with patient today including chart review.    Chadwick Lozano MD   This note was created using Dragon voice recognition software which may result in transcription errors.

## 2023-12-29 ENCOUNTER — TRANSFERRED RECORDS (OUTPATIENT)
Dept: HEALTH INFORMATION MANAGEMENT | Facility: CLINIC | Age: 63
End: 2023-12-29
Payer: COMMERCIAL

## 2024-01-04 ENCOUNTER — HOSPITAL ENCOUNTER (OUTPATIENT)
Dept: ULTRASOUND IMAGING | Facility: CLINIC | Age: 64
Discharge: HOME | End: 2024-01-04
Attending: SURGERY
Payer: COMMERCIAL

## 2024-01-04 ENCOUNTER — OFFICE VISIT (OUTPATIENT)
Dept: OTHER | Facility: CLINIC | Age: 64
End: 2024-01-04
Attending: SURGERY
Payer: COMMERCIAL

## 2024-01-04 VITALS — SYSTOLIC BLOOD PRESSURE: 151 MMHG | HEART RATE: 69 BPM | DIASTOLIC BLOOD PRESSURE: 74 MMHG

## 2024-01-04 DIAGNOSIS — I73.9 PAD (PERIPHERAL ARTERY DISEASE) (H): ICD-10-CM

## 2024-01-04 DIAGNOSIS — I73.9 PAD (PERIPHERAL ARTERY DISEASE) (H): Primary | ICD-10-CM

## 2024-01-04 DIAGNOSIS — I65.21 ASYMPTOMATIC STENOSIS OF RIGHT CAROTID ARTERY: ICD-10-CM

## 2024-01-04 DIAGNOSIS — Z98.890 HISTORY OF BILATERAL CAROTID ENDARTERECTOMY: ICD-10-CM

## 2024-01-04 DIAGNOSIS — I65.23 BILATERAL CAROTID ARTERY STENOSIS: ICD-10-CM

## 2024-01-04 PROCEDURE — 93926 LOWER EXTREMITY STUDY: CPT | Mod: RT

## 2024-01-04 PROCEDURE — 93923 UPR/LXTR ART STDY 3+ LVLS: CPT

## 2024-01-04 PROCEDURE — G0463 HOSPITAL OUTPT CLINIC VISIT: HCPCS | Mod: 25 | Performed by: SURGERY

## 2024-01-04 PROCEDURE — 93880 EXTRACRANIAL BILAT STUDY: CPT

## 2024-01-04 PROCEDURE — 99213 OFFICE O/P EST LOW 20 MIN: CPT | Mod: 24 | Performed by: SURGERY

## 2024-01-04 NOTE — PROGRESS NOTES
[facility]        Patient is here for a  follow up.    Pt is currently taking Statin, Plavix, and Xarelto.    BP (!) 151/74 (BP Location: Left arm, Patient Position: Chair, Cuff Size: Adult Regular)   Pulse 69   LMP  (LMP Unknown)     The provider has been notified that the patient has no concerns.     Questions patient would like addressed today are: N/A.    Refills are needed: N/A    Has homecare services and agency name:  Isa Al MA

## 2024-01-08 ENCOUNTER — OFFICE VISIT (OUTPATIENT)
Dept: INTERNAL MEDICINE | Facility: CLINIC | Age: 64
End: 2024-01-08
Payer: COMMERCIAL

## 2024-01-08 VITALS
WEIGHT: 109.8 LBS | BODY MASS INDEX: 20.73 KG/M2 | HEART RATE: 60 BPM | OXYGEN SATURATION: 98 % | DIASTOLIC BLOOD PRESSURE: 61 MMHG | TEMPERATURE: 97.4 F | SYSTOLIC BLOOD PRESSURE: 153 MMHG | HEIGHT: 61 IN

## 2024-01-08 DIAGNOSIS — J44.9 CHRONIC OBSTRUCTIVE PULMONARY DISEASE, UNSPECIFIED COPD TYPE (H): ICD-10-CM

## 2024-01-08 DIAGNOSIS — C85.91 LYMPHOMA OF LYMPH NODES OF NECK, UNSPECIFIED LYMPHOMA TYPE (H): ICD-10-CM

## 2024-01-08 DIAGNOSIS — D64.9 ANEMIA, UNSPECIFIED TYPE: ICD-10-CM

## 2024-01-08 DIAGNOSIS — L30.9 DERMATITIS: ICD-10-CM

## 2024-01-08 DIAGNOSIS — Z23 NEED FOR PROPHYLACTIC VACCINATION AGAINST STREPTOCOCCUS PNEUMONIAE (PNEUMOCOCCUS): ICD-10-CM

## 2024-01-08 DIAGNOSIS — E11.51 TYPE 2 DIABETES MELLITUS WITH DIABETIC PERIPHERAL ANGIOPATHY WITHOUT GANGRENE, WITHOUT LONG-TERM CURRENT USE OF INSULIN (H): Primary | ICD-10-CM

## 2024-01-08 LAB
ANION GAP SERPL CALCULATED.3IONS-SCNC: 11 MMOL/L (ref 7–15)
BUN SERPL-MCNC: 20.5 MG/DL (ref 8–23)
CALCIUM SERPL-MCNC: 9 MG/DL (ref 8.8–10.2)
CHLORIDE SERPL-SCNC: 101 MMOL/L (ref 98–107)
CREAT SERPL-MCNC: 0.76 MG/DL (ref 0.51–0.95)
DEPRECATED HCO3 PLAS-SCNC: 23 MMOL/L (ref 22–29)
EGFRCR SERPLBLD CKD-EPI 2021: 86 ML/MIN/1.73M2
ERYTHROCYTE [DISTWIDTH] IN BLOOD BY AUTOMATED COUNT: 14.9 % (ref 10–15)
FERRITIN SERPL-MCNC: 25 NG/ML (ref 11–328)
GLUCOSE SERPL-MCNC: 88 MG/DL (ref 70–99)
HBA1C MFR BLD: 5.2 % (ref 0–5.6)
HCT VFR BLD AUTO: 35.5 % (ref 35–47)
HGB BLD-MCNC: 11.4 G/DL (ref 11.7–15.7)
IRON BINDING CAPACITY (ROCHE): 429 UG/DL (ref 240–430)
IRON SATN MFR SERPL: 17 % (ref 15–46)
IRON SERPL-MCNC: 71 UG/DL (ref 37–145)
MCH RBC QN AUTO: 28.4 PG (ref 26.5–33)
MCHC RBC AUTO-ENTMCNC: 32.1 G/DL (ref 31.5–36.5)
MCV RBC AUTO: 89 FL (ref 78–100)
PLATELET # BLD AUTO: 239 10E3/UL (ref 150–450)
POTASSIUM SERPL-SCNC: 5 MMOL/L (ref 3.4–5.3)
RBC # BLD AUTO: 4.01 10E6/UL (ref 3.8–5.2)
SODIUM SERPL-SCNC: 135 MMOL/L (ref 135–145)
WBC # BLD AUTO: 5.7 10E3/UL (ref 4–11)

## 2024-01-08 PROCEDURE — 83550 IRON BINDING TEST: CPT | Performed by: INTERNAL MEDICINE

## 2024-01-08 PROCEDURE — 99207 PR FOOT EXAM NO CHARGE: CPT | Performed by: INTERNAL MEDICINE

## 2024-01-08 PROCEDURE — 90677 PCV20 VACCINE IM: CPT | Performed by: INTERNAL MEDICINE

## 2024-01-08 PROCEDURE — 36415 COLL VENOUS BLD VENIPUNCTURE: CPT | Performed by: INTERNAL MEDICINE

## 2024-01-08 PROCEDURE — 99214 OFFICE O/P EST MOD 30 MIN: CPT | Mod: 25 | Performed by: INTERNAL MEDICINE

## 2024-01-08 PROCEDURE — 83036 HEMOGLOBIN GLYCOSYLATED A1C: CPT | Performed by: INTERNAL MEDICINE

## 2024-01-08 PROCEDURE — 82728 ASSAY OF FERRITIN: CPT | Performed by: INTERNAL MEDICINE

## 2024-01-08 PROCEDURE — 83540 ASSAY OF IRON: CPT | Performed by: INTERNAL MEDICINE

## 2024-01-08 PROCEDURE — 85027 COMPLETE CBC AUTOMATED: CPT | Performed by: INTERNAL MEDICINE

## 2024-01-08 PROCEDURE — G0009 ADMIN PNEUMOCOCCAL VACCINE: HCPCS | Performed by: INTERNAL MEDICINE

## 2024-01-08 PROCEDURE — 80048 BASIC METABOLIC PNL TOTAL CA: CPT | Performed by: INTERNAL MEDICINE

## 2024-01-08 RX ORDER — TRIAMCINOLONE ACETONIDE 1 MG/G
OINTMENT TOPICAL 2 TIMES DAILY PRN
Qty: 60 G | Refills: 3 | Status: ON HOLD | OUTPATIENT
Start: 2024-01-08 | End: 2024-05-15

## 2024-01-08 NOTE — PROGRESS NOTES
ASSESSMENT:    1. Lymphoma of lymph nodes of neck, unspecified lymphoma type (H)  No chem for now per Oncology unless worsened. Will follow-up with [facility] per their previous instruction    2. Type 2 diabetes mellitus with diabetic peripheral angiopathy without gangrene, without long-term current use of insulin (H)   Previous controlled with A1C 2022. Not checking sugars. Needs lab follow-up  - HEMOGLOBIN A1C; Future  - Basic metabolic panel  (Ca, Cl, CO2, Creat, Gluc, K, Na, BUN); Future  - FOOT EXAM  - HEMOGLOBIN A1C  - Basic metabolic panel  (Ca, Cl, CO2, Creat, Gluc, K, Na, BUN)    3. Anemia, unspecified type   Secondary to previous GI loss plus ? Related to lymphoma. Denies seeing blood in stools now. Lab as ordered. On PPI  - CBC with platelets; Future  - Iron & Iron Binding Capacity; Future  - Ferritin; Future  - CBC with platelets  - Iron & Iron Binding Capacity  - Ferritin    4. Dermatitis  ? Related to lymphoma. Has appt with dermatology scheduled for bx. Will treat symptomatically for now with topical steroid  - triamcinolone (KENALOG) 0.1 % external ointment; Apply topically 2 times daily as needed for irritation Apply to back  Dispense: 60 g; Refill: 3    5. Need for prophylactic vaccination against Streptococcus pneumoniae (pneumococcus)  - Pneumococcal 20 Valent Conjugate (PCV20)     6. Chronic obstructive pulmonary disease, unspecified COPD type (H)  Stable.  Continue inhaler therapy.  Smoking cessation encouraged      PLAN:  Labs as ordered  See Dermatology as scheduled  Follow-up with Vascular, Oncology, GI., Cardiology per their previous instruction to you  Triamcinolone (KENALOG) topical steroid 0.1 % external ointment. Apply topically 2 times daily to affected rash areas as needed for irritation.itching  Prevnar 20 vaccine  Continue current medications  Recommend discontinuation of all cigarette use   Would recommend RSV vaccination. Get at a pharmacy           Vishal Watson is a 63  year old, presenting for the following health issues:  Hospital F/U      [facility]         Hospital Follow-up Visit:    Hospital/Nursing Home/IP Rehab Facility: [facility]  Date of Admission: 12/10/2023  Date of Discharge: 12/15/2023  Reason(s) for Admission: acute on chronic anemia    Was your hospitalization related to COVID-19? No   Problems taking medications regularly:  None  Medication changes since discharge: None  Problems adhering to non-medication therapy:  None    Summary of hospitalization:  [facility] discharge summary reviewed  Diagnostic Tests/Treatments reviewed.  Follow up needed: labs  Other Healthcare Providers Involved in Patient s Care:          Vascular, Oncology, GI., Cardiology  Update since discharge: improved.         Plan of care communicated with patient      Discharge summary reviewed. Part of the summary as below:    [facility]     Discharge Summary  Hospitalist     Date of Admission:  12/10/2023  Date of Discharge:  12/15/2023  Discharging Provider: Vadim Ramirez MD, MD        Discharge Diagnoses  Acute on chronic possible blood loss anemia while on anticoagulation with Plavix and Xarelto.  Rule out GI bleed  Nonsevere reflux esophagitis  History of GERD  S/p recent emergent right femoral to anterior tibial PTFE graft thrombectomy, anterior tibial thrombectomy intraoperative angiography by vascular surgery on 10/11/2023.  History of peripheral artery disease status post bilateral lower extremity bypass grafting.  History of bilateral carotid endarterectomy.  History of Takotsubo cardiomyopathy with EF of 30 to 35%  COPD  History of marginal zone lymphoma     Hospital Course:     63-year-old female who was recently hospitalized from 10/3-10/14/23 for acute occlusion of her RLE PTFE Graft S/p embolectomy and thromectomy by vascular surgery. Patient received 2 units of PRBCs intraoperatively.  Hemoglobin was 7.2 prior to  procedure and during that hospitalization hemoglobins stable around 9 g/dL. She presented to the emergency room on 12/10/2023 with complaints of worsening right lower extremity pain and dyspnea on exertion and was found to be anemic with hemoglobin of 6. Her occult stool blood testing returned positive.  She was admitted for further evaluation of acute on chronic anemia.     Acute on chronic possible blood loss anemia while on anticoagulation with Plavix and Xarelto.  Rule out GI bleed  Nonsevere reflux esophagitis  History of GERD  -Patient presents with a hemoglobin of 6.0, her previous hemoglobin on 11/13/2023 was 10.5  -Iron studies within normal limits, B12 also normal.  Normal LDH and haptoglobin argues against hemolytic process.  -Patient was admitted with concern for acute blood loss anemia most likely from GI source although patient denied hematochezia or melena or bright red bleeding  -Altogether received 3 units of PRBC since admission  -Clinically there was no evidence of GI bleeding.  -[facility] was consulted, patient underwent upper GI endoscopy on 1212 and colonoscopy on 12/14.  Upper GI endoscopy showed nonsevere reflux esophagitis with no evidence of bleeding.  Colonoscopy yesterday showed multiple polyps including one 9 mm polyp which was removed with a cold snare and placed.  Colon was however inadequately prepped with stool in the entire colon, small lesions could be missed.  Discussed with GI after colonoscopy, given that there was no residual blood in the colon, he did not think that there was high concern for GI bleeding.  He did not recommend any additional GI workup at this time.  -Subsequent hemoglobin is stable at 8.4  -Also discussed with Dr. Barrow from [facility], patient is already scheduled for a bilateral biopsy on 12/19 as outpatient.  It is conceivable given her history of lymphoma that the anemia could be related to bone marrow.  They also do not have any  contraindication for anticoagulation at this time.  -Patient continued on heparin drip overnight, she will be transitioned to Xarelto later this afternoon.  Dr. Lozano from vascular surgery recommends decreasing the dose of Xarelto from 20 mg to 15 mg daily.  Prior to admission Plavix also resumed.  I recommend getting CBC through PCP within a week  -Continue preadmission PPI  -Follow-up closely with hematology/oncology as outpatient.     S/p recent emergent right femoral to anterior tibial PTFE graft thrombectomy, anterior tibial thrombectomy intraoperative angiography by vascular surgery on 10/11/2023.  History of peripheral artery disease status post bilateral lower extremity bypass grafting.  History of bilateral carotid endarterectomy.  -As noted above patient underwent emergent graft thrombectomy during recent hospitalization and October   -Current ultrasound showed bilateral lower extremity bypass grafts are patent without any stenosis  -Vascular surgery consulted  -Prior to admission Xarelto and Plavix on hold due to concerns for bleeding however, patient was started on low intensity heparin drip due to her extensive history of peripheral arterial disease and recent thrombosis of the graft needing embolectomy and thrombectomy  -As mentioned above, patient will be transitioned from heparin drip to Xarelto today and discharged home later     History of Takotsubo cardiomyopathy with EF of 30 to 35%  -Patient had recent coronary angiogram on 10/4/2023 which showed no new lesions  -Continue PTA Coreg, Norvasc, statin  -Continue prior to admission lisinopril  -Continue prior to admission Jardiance     COPD  -PTA regimen includes Leelee-Ellipta. Well controlled.   -Continue PTA inhaler  -Respiratory status stable     History of marginal zone lymphoma  -Oncology following     Vadim Ramirez MD, MD       Since discharge, patient smoking 2 cigarettes a day.  Occasional dry cough but breathing better.  Denies chest  "pain, orthopnea, PND.  Patient eating well now.  HAS some constipation.  Denies seeing blood in stools.  Stools brown in color.  History of anemia with previous GI bleeding.  Patient states recent hemoglobin through oncology was 10.6.  Rare cramps in the legs.  History PAD with recent U/S showing patent grafts   Using orthotics for feet.   LE weakness from CMT the same. Has been dx'd with B cell lymphoma. Recent oncology note reviewed. Deferring chemotherapy for now   Has rash on upper/lower back that itches. ? Related to lymphoma. Oncology has referred pt to dermatology   Not checking sugars. Last A1C 5.3 in 2022       Additional ROS:   Constitutional, HEENT, Cardiovascular, Pulmonary, GI and , Neuro, MSK and Psych review of systems/symptoms are otherwise negative or unchanged from previous, except as noted above.      OBJECTIVE:  BP (!) 153/61   Pulse 60   Temp 97.4  F (36.3  C) (Temporal)   Ht 1.549 m (5' 1\")   Wt 49.8 kg (109 lb 12.8 oz)   LMP  (LMP Unknown)   SpO2 98%   BMI 20.75 kg/m     Estimated body mass index is 20.75 kg/m  as calculated from the following:    Height as of this encounter: 1.549 m (5' 1\").    Weight as of this encounter: 49.8 kg (109 lb 12.8 oz).     Neck:  . Thyroid normal to palpation. Bilateral CEA scar. Adenopathy palpable  Pulm: Lungs clear to auscultation   CV: Regular rates and rhythm  GI: Soft, nontender, Normal active bowel sounds, No hepatosplenomegaly or masses palpable  Ext: Peripheral pulses reduced but palpable No edema. Bilateral pes cavus  Skin: papular erythematous rash bilateral upper/lower back. No increased warmth      (Chart documentation was completed, in part, with GÃ¼dpod voice-recognition software. Even though reviewed, some grammatical, spelling, and word errors may remain.)    Vasquez Benoit MD  Internal Medicine Department  [facility]            "

## 2024-01-08 NOTE — PROGRESS NOTES
Prior to immunization administration, verified patients identity using patient s name and date of birth. Please see Immunization Activity for additional information.     Screening Questionnaire for Adult Immunization    Are you sick today?   No   Do you have allergies to medications, food, a vaccine component or latex?   No   Have you ever had a serious reaction after receiving a vaccination?   No   Do you have a long-term health problem with heart, lung, kidney, or metabolic disease (e.g., diabetes), asthma, a blood disorder, no spleen, complement component deficiency, a cochlear implant, or a spinal fluid leak?  Are you on long-term aspirin therapy?   No   Do you have cancer, leukemia, HIV/AIDS, or any other immune system problem?   No   Do you have a parent, brother, or sister with an immune system problem?   No   In the past 3 months, have you taken medications that affect  your immune system, such as prednisone, other steroids, or anticancer drugs; drugs for the treatment of rheumatoid arthritis, Crohn s disease, or psoriasis; or have you had radiation treatments?   No   Have you had a seizure, or a brain or other nervous system problem?   No   During the past year, have you received a transfusion of blood or blood    products, or been given immune (gamma) globulin or antiviral drug?   No   For women: Are you pregnant or is there a chance you could become       pregnant during the next month?   No   Have you received any vaccinations in the past 4 weeks?   No     Immunization questionnaire answers were all negative.      Patient instructed to remain in clinic for 15 minutes afterwards, and to report any adverse reactions.     Screening performed by Ila Hernandez MA on 1/8/2024 at 3:30 PM.

## 2024-01-14 ENCOUNTER — HEALTH MAINTENANCE LETTER (OUTPATIENT)
Age: 64
End: 2024-01-14

## 2024-01-21 PROBLEM — Z98.890 CRITICAL ISCHEMIA OF EXTREMITY WITH HISTORY OF REVASCULARIZATION OF SAME EXTREMITY (H): Status: RESOLVED | Noted: 2022-12-28 | Resolved: 2024-01-21

## 2024-01-21 PROBLEM — L98.492: Status: ACTIVE | Noted: 2024-01-21

## 2024-01-21 PROBLEM — I70.229 CRITICAL ISCHEMIA OF EXTREMITY WITH HISTORY OF REVASCULARIZATION OF SAME EXTREMITY (H): Status: RESOLVED | Noted: 2022-12-28 | Resolved: 2024-01-21

## 2024-01-21 PROBLEM — L98.492: Status: RESOLVED | Noted: 2024-01-21 | Resolved: 2024-01-21

## 2024-01-22 NOTE — PATIENT INSTRUCTIONS
Labs as ordered  See Dermatology as scheduled  Follow-up with Vascular, Oncology, GI., Cardiology per their previous instruction to you  Triamcinolone (KENALOG) topical steroid 0.1 % external ointment. Apply topically 2 times daily to affected rash areas as needed for irritation.itching  Prevnar 20 vaccine  Continue current medications  Recommend discontinuation of all cigarette use   Would recommend RSV vaccination. Get at a pharmacy   Patient resting in bed with call light in reach. Complains of pain 7 out of 10. PRN pain medication given at this time. Blood pressure (!) 145/92, pulse 97, temperature 98 °F (36.7 °C), temperature source Oral, resp. rate 18, height 5' 7\" (1.702 m), weight 190 lb 14.4 oz (86.6 kg), last menstrual period 12/01/2014, SpO2 97 %, not currently breastfeeding. Sodium Bicarb continues infusing at 100 ml/H. Nephrostomy tube draining yellow clear urine. Murguia catheter draining scant amount of yellow sediment urine. No other changes since prior assessment.      Electronically signed by Tin Barrios RN on 4/26/2021 at 5:26 AM

## 2024-01-23 ENCOUNTER — APPOINTMENT (OUTPATIENT)
Dept: URBAN - METROPOLITAN AREA CLINIC 256 | Age: 64
Setting detail: DERMATOLOGY
End: 2024-01-24

## 2024-01-23 DIAGNOSIS — L259 CONTACT DERMATITIS AND OTHER ECZEMA, UNSPECIFIED CAUSE: ICD-10-CM

## 2024-01-23 PROBLEM — L23.9 ALLERGIC CONTACT DERMATITIS, UNSPECIFIED CAUSE: Status: ACTIVE | Noted: 2024-01-23

## 2024-01-23 PROCEDURE — OTHER MIPS QUALITY: OTHER

## 2024-01-23 PROCEDURE — OTHER COUNSELING: OTHER

## 2024-01-23 PROCEDURE — OTHER PHOTO-DOCUMENTATION: OTHER

## 2024-01-23 PROCEDURE — OTHER PRESCRIPTION MEDICATION MANAGEMENT: OTHER

## 2024-01-23 PROCEDURE — OTHER EDUCATIONAL RESOURCES PROVIDED: OTHER

## 2024-01-23 PROCEDURE — OTHER PRESCRIPTION: OTHER

## 2024-01-23 PROCEDURE — 99204 OFFICE O/P NEW MOD 45 MIN: CPT

## 2024-01-23 RX ORDER — FLUOCINONIDE 0.5 MG/G
OINTMENT TOPICAL BID
Qty: 120 | Refills: 1 | Status: ERX | COMMUNITY
Start: 2024-01-23

## 2024-01-23 NOTE — HPI: RASH
How Severe Is Your Rash?: severe
Is This A New Presentation, Or A Follow-Up?: Referral for Rash
Who Is Your Referring Provider?: [facility]

## 2024-01-23 NOTE — PROCEDURE: MIPS QUALITY
Quality 110: Preventive Care And Screening: Influenza Immunization: Influenza Immunization previously received during influenza season
Detail Level: Detailed
Quality 431: Preventive Care And Screening: Unhealthy Alcohol Use - Screening: Patient not identified as an unhealthy alcohol user when screened for unhealthy alcohol use using a systematic screening method
Quality 226: Preventive Care And Screening: Tobacco Use: Screening And Cessation Intervention: Patient screened for tobacco use, is a smoker AND did not receive Cessation Counseling during measurement period or in the six months prior to the measurement period
Quality 130: Documentation Of Current Medications In The Medical Record: Current Medications with Name, Dosage, Frequency, or Route not Documented, Reason not Given

## 2024-01-23 NOTE — PROCEDURE: PRESCRIPTION MEDICATION MANAGEMENT
Initiate Treatment: Fluocinonide 0.05% topical ointment BID on the back right after showering
Detail Level: Zone
Plan: F/U in 3 weeks
Render In Strict Bullet Format?: No
Modify Regimen: Suave hair products to Suave (non-botanical) or switch to Vanicream hair products

## 2024-01-26 ENCOUNTER — ANCILLARY PROCEDURE (OUTPATIENT)
Dept: CT IMAGING | Facility: CLINIC | Age: 64
End: 2024-01-26
Attending: INTERNAL MEDICINE
Payer: COMMERCIAL

## 2024-01-26 DIAGNOSIS — C83.01 SMALL CELL B-CELL LYMPHOMA, LYMPH NODES OF HEAD, FACE, AND NECK (H): ICD-10-CM

## 2024-01-26 PROCEDURE — 70490 CT SOFT TISSUE NECK W/O DYE: CPT

## 2024-01-26 PROCEDURE — 71250 CT THORAX DX C-: CPT

## 2024-01-26 RX ORDER — IOPAMIDOL 755 MG/ML
117 INJECTION, SOLUTION INTRAVASCULAR ONCE
Status: COMPLETED | OUTPATIENT
Start: 2024-01-26 | End: 2024-01-26

## 2024-01-30 ENCOUNTER — TRANSFERRED RECORDS (OUTPATIENT)
Dept: HEALTH INFORMATION MANAGEMENT | Facility: CLINIC | Age: 64
End: 2024-01-30
Payer: COMMERCIAL

## 2024-02-06 NOTE — PROGRESS NOTES
Forms placed on providers desk for review.   [facility]        Patient is here for a  follow up suture removal    Pt is currently taking Aspirin, Statin and Plavix.    Patient's condition is stable.    BP (!) 157/75 (BP Location: Left arm, Patient Position: Chair, Cuff Size: Adult Regular)   Pulse 72   Breastfeeding No     The provider has been notified that the patient has no concerns.     Questions patient would like addressed today are: N/A.    Refills are needed: N/A    Has homecare services and agency name:  Isa Al MA

## 2024-02-22 ENCOUNTER — TELEPHONE (OUTPATIENT)
Dept: OTHER | Facility: CLINIC | Age: 64
End: 2024-02-22
Payer: COMMERCIAL

## 2024-02-22 DIAGNOSIS — I73.9 PERIPHERAL VASCULAR DISEASE (H): ICD-10-CM

## 2024-02-22 NOTE — TELEPHONE ENCOUNTER
Returned call to patient to discuss further.  She is to be on 15mg Xarelto daily.  Confirmed with Dr. Lozano.  VORB.  Will send refill to [facility] off [address]. Future refills should be filled by patient's PCP.    Patient aware to call back with any questions or concerns.    Alaina Yanez, ESTHERN, RN, [facility]

## 2024-02-22 NOTE — TELEPHONE ENCOUNTER
[facility]    Who is the name of the provider?:  OSMAN URIOSTEGUI   What is the location you see this provider at/preferred location?: April  Person calling / Facility: Shirley Hendricks  Phone number:  [phone] (Mobile)   Nurse call back needed:  Yes     Reason for call:  Shirley has been off her blood thinners for about a week - was prescribed xarelto 20mg in Nov but after leaving hospital in Dec they lower dosage to 15mg - the Dr that placed the prescription is not returning the pharmacies request for refill and Shirley is getting scared - she does not know what to do     Pharmacy location:  [id number] - [facility] [id number]  Outside Imaging: n/a   Can we leave a detailed message on this number?  YES     2/22/2024, 11:02 AM

## 2024-02-28 ENCOUNTER — HOSPITAL ENCOUNTER (OUTPATIENT)
Dept: GENERAL RADIOLOGY | Facility: CLINIC | Age: 64
Discharge: HOME | End: 2024-02-28
Attending: INTERNAL MEDICINE
Payer: COMMERCIAL

## 2024-02-28 DIAGNOSIS — D50.9 IRON DEFICIENCY ANEMIA, UNSPECIFIED: ICD-10-CM

## 2024-02-28 PROCEDURE — 999N000065 XR ABDOMEN 2 VIEWS

## 2024-03-01 NOTE — PROGRESS NOTES
Printed attachment:  In Dr Nikita GUZMAN   [facility]    Medicine Progress Note - Hospitalist Service        Date of Admission:  12/10/2023  4:00 PM    Assessment & Plan:   63-year-old female who was recently hospitalized from 10/3-10/14/23 for acute occlusion of her RLE PTFE Graft S/p embolectomy and thromectomy by vascular surgery. Patient received 2 units of PRBCs intraoperatively.  Hemoglobin was 7.2 prior to procedure and during that hospitalization hemoglobins stable around 9 g/dL. She presented to the emergency room on 12/10/2023 with complaints of worsening right lower extremity pain and dyspnea on exertion and was found to be anemic with hemoglobin of 6. Her occult stool blood testing returned positive.  She was admitted for further evaluation of acute on chronic anemia.     Acute on chronic possible blood loss anemia while on anticoagulation with Plavix and Xarelto.  Rule out GI bleed  Nonsevere reflux esophagitis  History of GERD  -Patient presents with a hemoglobin of 6.0, her previous hemoglobin on 11/13/2023 was 10.5  -Iron studies within normal limits, B12 also normal.  Normal LDH argues against hemolytic process.  -Concern for acute blood loss anemia most likely from GI source although patient denies hematochezia or melena or bright red bleeding  -Altogether has 2 units of PRBC since transfusion, hemoglobin continues to drift down, at 6.9 today  -Transfuse with 1 more unit of PRBC today  -Evaluated by [facility], underwent EGD on 12/12 which showed nonsevere reflux esophagitis with no evidence of bleeding.  Unremarkable as far as source of bleeding, she was inadequately prepped for colonoscopy and therefore plan is to repeat colonoscopy today.  -Continue twice daily PPI  -Heparin drip on hold for anticipated colonoscopy  -Serial hemoglobin through 12-hour  -Depending on findings on colonoscopy, will have to discuss neck steps and plan about resumption of prior to admission Xarelto and Plavix.    S/p recent emergent right  femoral to anterior tibial PTFE graft thrombectomy, anterior tibial thrombectomy intraoperative angiography by vascular surgery on 10/11/2023.  History of peripheral artery disease status post bilateral lower extremity bypass grafting.  History of bilateral carotid endarterectomy.  -As noted above patient underwent emergent graft thrombectomy during recent hospitalization and October   -Current ultrasound showed bilateral lower extremity bypass grafts are patent without any stenosis  -Vascular surgery consulted  -Prior to admission Xarelto and Plavix on hold due to concerns for bleeding however, patient was started on low intensity heparin drip due to her extensive history of peripheral arterial disease and recent thrombosis of the graft needing embolectomy and thrombectomy  -As mentioned above heparin drip is on hold since this morning for anticipated colonoscopy.  Will resume heparin drip after colonoscopy depending on findings and plan for subsequent steps.    History of Takotsubo cardiomyopathy with EF of 30 to 35%  -Patient had recent coronary angiogram on 10/4/2023 which showed no new lesions  -Continue PTA Coreg, Norvasc, statin  -Continue prior to admission lisinopril  -Continue prior to admission Jardiance  -IV fluids started as patient has not had much to eat and drink because of being n.p.o.  -Discontinue IV fluids today.     COPD  -PTA regimen includes Leelee-Ellipta. Well controlled.   -Continue PTA inhaler  -Respiratory status stable    History of marginal zone lymphoma  -Oncology following       Diet: Snacks/Supplements Pediatric: Ensure Clear; Between Meals  NPO per Anesthesia Guidelines for Procedure/Surgery Except for: Meds     DVT Prophylaxis: Heparin drip, on hold this morning  Alvarez Catheter: Not present  Code Status: Full Code     Disposition Plan       Expected Discharge Date: 12/16/2023      Destination: home  Discharge Comments: 12/12: colonoscopy  and egd today      Entered: Vadim Ramirez  "MD 12/14/2023, 11:31 AM        Clinically Significant Risk Factors                  # Hypertension: Noted on problem list            # Financial/Environmental Concerns: none         The patient's care was discussed with the Bedside Nurse and Patient.  And sister on the phone    Medical Decision Making       **CLEAR ALL SELECTIONS**      Labs/Imaging Reviewed:  See Information above and Data section below  Time SPENT BY ME on the date of service doing chart review, history, exam, documentation & further activities per the note:  35 MINUTES    Chart documentation was completed, in part, with [employer] voice-recognition software. Even though reviewed, some grammatical, spelling, and word errors may remain.    Vadim Ramirez MD  Hospitalist Service  [facility]  Text Page 7AM-6PM  Securely message with the Vocera Web Console (learn more here)  Text page via [employer] Paging/Directory    ______________________________________________________________________    Interval History   Hemoglobin dropped again to 6.5.  Patient denies hematochezia or melena.  She appears to have been adequately prepped for colonoscopy today which is planned for later this afternoon.  Denies abdominal pain.    Data reviewed today: I reviewed all medications, new labs and imaging results over the last 24 hours. I personally reviewed no images or EKG's today.    Physical Exam   Vital signs:  Temp: 98.6  F (37  C) Temp src: Oral BP: (!) 143/58 Pulse: 59   Resp: 14 SpO2: 97 % O2 Device: None (Room air)   Height: 154.9 cm (5' 1\") Weight: 50.8 kg (112 lb)  Estimated body mass index is 21.16 kg/m  as calculated from the following:    Height as of this encounter: 1.549 m (5' 1\").    Weight as of this encounter: 50.8 kg (112 lb).      Wt Readings from Last 2 Encounters:   12/12/23 50.8 kg (112 lb)   11/21/23 50.6 kg (111 lb 8 oz)       Gen: AAOX3, NAD, comfortable  HEENT: Pale conjunctiva  Resp: Diminished at the bases bilaterally, " normal effort of breathing  CVS: RRR, no murmur  Abd/GI: Soft, non-tender. BS- normoactive.    Skin: Warm, dry no rashes  MSK: no pedal edema  Neuro- CN- intact. No focal deficits.      Data   Recent Labs   Lab 12/14/23  0547 12/13/23  2117 12/13/23  1444 12/13/23  0527 12/12/23  1509 12/12/23  0529 12/11/23  1126 12/11/23  0550 12/10/23  2311 12/10/23  2202 12/10/23  1635   WBC  --   --   --  4.4  --  4.8  --  4.8  --   --  5.4   HGB 6.9* 7.4* 7.1* 7.5*   < > 8.4*   < > 8.0*   < >  --  6.0*   MCV  --   --   --  89  --  87  --  86  --   --  89   PLT  --   --   --  206  --  245  --  204  --   --  259   NA  --   --   --   --   --   --   --  133*  --   --  131*   POTASSIUM  --   --   --   --   --   --   --  4.4  --   --  4.5   CHLORIDE  --   --   --   --   --   --   --  104  --   --  100   CO2  --   --   --   --   --   --   --  19*  --   --  20*   BUN  --   --   --   --   --   --   --  24.9*  --   --  23.4*   CR  --   --   --   --   --   --   --  0.87  --   --  0.79   ANIONGAP  --   --   --   --   --   --   --  10  --   --  11   SONIA  --   --   --   --   --   --   --  8.3*  --   --  8.9   GLC  --   --   --   --   --   --   --  86  --  119* 94    < > = values in this interval not displayed.       No results found for this or any previous visit (from the past 24 hour(s)).    Medications    dextrose 5% and 0.9% NaCl 75 mL/hr at 12/14/23 0129    [Held by provider] heparin Stopped (12/14/23 0648)    - MEDICATION INSTRUCTIONS -        acetaminophen  1,000 mg Oral Q8H    amLODIPine  2.5 mg Oral Daily    carvedilol  6.25 mg Oral BID w/meals    empagliflozin  10 mg Oral Daily    famotidine  20 mg Intravenous Q24H    ferrous sulfate  325 mg Oral Every Other Day    fluticasone-vilanterol  1 puff Inhalation Daily    gabapentin  100 mg Oral TID    lisinopril  10 mg Oral Daily    nicotine  1 patch Transdermal Daily    nicotine   Transdermal Q8H    omeprazole  40 mg Oral BID AC    rosuvastatin  40 mg Oral At Bedtime    senna-docusate   1 tablet Oral BID    sodium chloride (PF)  10-40 mL Intracatheter Q7 Days    sodium chloride (PF)  3 mL Intracatheter Q8H

## 2024-03-19 ENCOUNTER — TRANSFERRED RECORDS (OUTPATIENT)
Dept: HEALTH INFORMATION MANAGEMENT | Facility: CLINIC | Age: 64
End: 2024-03-19
Payer: COMMERCIAL

## 2024-03-20 ENCOUNTER — TRANSFERRED RECORDS (OUTPATIENT)
Dept: HEALTH INFORMATION MANAGEMENT | Facility: CLINIC | Age: 64
End: 2024-03-20
Payer: COMMERCIAL

## 2024-03-28 ENCOUNTER — OFFICE VISIT (OUTPATIENT)
Dept: DERMATOLOGY | Facility: CLINIC | Age: 64
End: 2024-03-28
Payer: COMMERCIAL

## 2024-03-28 DIAGNOSIS — L50.3 DERMATOGRAPHIA: ICD-10-CM

## 2024-03-28 DIAGNOSIS — L30.8 SPONGIOTIC DERMATITIS: Primary | ICD-10-CM

## 2024-03-28 PROCEDURE — 99203 OFFICE O/P NEW LOW 30 MIN: CPT | Performed by: DERMATOLOGY

## 2024-03-28 RX ORDER — BETAMETHASONE DIPROPIONATE 0.5 MG/G
CREAM TOPICAL 2 TIMES DAILY
Qty: 100 G | Refills: 6 | Status: ON HOLD | OUTPATIENT
Start: 2024-03-28 | End: 2024-05-15

## 2024-03-28 NOTE — PATIENT INSTRUCTIONS
Start medicated cream from pharmacy, Dr. Fischer sent in today    1 Claritin in the am and 1 zyrtec before bed    Proper skin care from [facility]:    -Eliminate harsh soaps as they strip the natural oils from the skin, often resulting in dry itchy skin ( i.e. Dial, Zest, Opal Spring)  -Use mild soaps such as Cetaphil or Dove Sensitive Skin in the shower. You do not need to use soap on arms, legs, and trunk every time you shower unless visibly soiled.   -Avoid hot or cold showers.  -After showering, lightly dry off and apply moisturizing within 2-3 minutes. This will help trap moisture in the skin.   -Aggressive use of a moisturizer at least 1-2 times a day to the entire body (including -Vanicream, Cetaphil, Aquaphor or Cerave) and moisturize hands after every washing.  -We recommend using moisturizers that come in a tub that needs to be scooped out, not a pump. This has more of an oil base. It will hold moisture in your skin much better than a water base moisturizer. The above recommended are non-pore clogging.      Wear a sunscreen with at least SPF 30 on your face, ears, neck and V of the chest daily. Wear sunscreen on other areas of the body if those areas are exposed to the sun throughout the day. Sunscreens can contain physical and/or chemical blockers. Physical blockers are less likely to clog pores, these include zinc oxide and titanium dioxide. Reapply every two hour and after swimming.     Sunscreen examples: https://www.ewg.org/sunscreen/    UV radiation  UVA radiation remains constant throughout the day and throughout the year. It is a longer wavelength than UVB and therefore penetrates deeper into the skin leading to immediate and delayed tanning, photoaging, and skin cancer. 70-80% of UVA and UVB radiation occurs between the hours of 10am-2pm.  UVB radiation  UVB radiation causes the most harmful effects and is more significant during the summer months. However, snow and ice can reflect UVB  radiation leading to skin damage during the winter months as well. UVB radiation is responsible for tanning, burning, inflammation, delayed erythema (pinkness), pigmentation (brown spots), and skin cancer.     I recommend self monthly full body exams and yearly full body exams with a dermatology provider. If you develop a new or changing lesion please follow up for examination. Most skin cancers are pink and scaly or pink and pearly. However, we do see blue/brown/black skin cancers.  Consider the ABCDEs of melanoma when giving yourself your monthly full body exam ( don't forget the groin, buttocks, feet, toes, etc). A-asymmetry, B-borders, C-color, D-diameter, E-elevation or evolving. If you see any of these changes please follow up in clinic. If you cannot see your back I recommend purchasing a hand held mirror to use with a larger wall mirror.

## 2024-03-28 NOTE — LETTER
3/28/2024         RE: Shirley Hendricks  [address]  [facility] [id number]        Dear Colleague,    Thank you for referring your patient, Shirley Hendricks, to the [facility]. Please see a copy of my visit note below.    Shirley Hendricks is an extremely pleasant 63 year old year old female patient here today for rash on trunk.   .   Patient states this has been present for a while.  Patient reports the following symptoms:  itching.  Patient reports the following previous treatments none.  These treatments did not work.  Patient reports the following modifying factors none.  Associated symptoms: none.  Patient has no other skin complaints today.  Remainder of the HPI, Meds, PMH, Allergies, FH, and SH was reviewed in chart.      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 Simons dermatology     Embolism and thrombosis of unspecified artery (H) 08/2000    Protein C,S, Leiden FV, Lupus Inhibitor Negative     Gastroesophageal reflux disease      Hyperlipidaemia      Hypertension      Lupus (H)     skin     Mild major depression (H24) 11/07/2017     Myocardial infarction (H)     x3     Osteoarthrosis, unspecified whether generalized or localized, unspecified site      PAD (peripheral artery disease) (H24)      Peripheral vascular disease, unspecified (H24) 12/2000    s/p angioplasty with stent right femoral a.; Right iliac and femoral a. clot     Post-menopausal      Reflux esophagitis 02/2004    EGD: esophagitis and medium HH     SBO (small bowel obstruction) (H) 08/10/2021     SVT (supraventricular tachycardia)      Thrombocytopenia (H24)      Uncomplicated asthma      Vitamin C deficiency 08/12/2018       Past Surgical History:    Procedure Laterality Date     ANGIOGRAM       ANGIOGRAM Right 6/23/2021    Procedure: RIGHT LOWER EXTREMITY ANGIOGRAM WITH LEFT BRACHIAL ARTERY CUTDOWN;  Surgeon: José Luis Hernandez MD;  Location:  OR     BIOPSY MASS NECK Right 10/11/2023    Procedure: Right Parotid Biopsy;  Surgeon: Randal Mendoza MD;  Location:  OR     BYPASS GRAFT FEMOROPOPLITEAL Right 09/23/2020    Procedure: RIGHT FEMORAL GRAFT TO ABOVE-KNEE POPLITEAL BYPASS USING CADAVERIC SUPERFICIAL FEMORAL ARTERY;  Surgeon: Chadwick Lozano MD;  Location:  OR     BYPASS GRAFT FEMOROPOPLITEAL Right 2/15/2022    Procedure: RIGHT SUPERFICIAL FEMORAL ARTERY GRAFT TO BELOW KNEE POPLITEAL BYPASS WITH CADAVERIC CRYOLIFE  FEMORAL-POPLITEAL ARTERY SIZE: OUTER DIAMETER: 7MM - 6MM;  Surgeon: Chadwick Lozano;  Location:  OR     BYPASS GRAFT FEMOROPOPLITEAL Right 5/26/2023    Procedure: RIGHT DISTAL SUPERFICIAL FEMORAL GRAFT TO ANTERIOR TIBIAL ARTERY WITH 26 CENTIMETER CADAVERIC SUPERFICIAL FEMORAL ARTERY GRAFT;  Surgeon: Chadwick Lozano MD;  Location:  OR     BYPASS GRAFT FEMOROPOPLITEAL Right 10/11/2023    Procedure: RIGHT FEMORAL TO POPLITEAL GRAFT THROMBECTOMY;  Surgeon: Chadwick Lozano MD;  Location:  OR     BYPASS GRAFT INSITU FEMOROPOPLITEAL Right 7/7/2021    Procedure: CREATION RIGHT FEMORAL TO POPLITEAL ARTERIAL BYPASS USING INSITU VEIN GRAFT;  Surgeon: José Luis Hernandez MD;  Location:  OR     CARDIAC CATHERIZATION  09/03/2009    multivessel CAD without target lesions, med mgmt indicated, preserved ef     CARPAL TUNNEL RELEASE RT/LT Right 05/20/2021     COLONOSCOPY N/A 12/12/2023    Procedure: Colonoscopy;  Surgeon: Corey Hoffman MD;  Location:  GI     COLONOSCOPY N/A 12/14/2023    Procedure: Colonoscopy;  Surgeon: Corey Hoffman MD;  Location:  GI     CV CORONARY ANGIOGRAM N/A 10/4/2023    Procedure: Coronary Angiogram;  Surgeon: Rogelio Ricks MD;  Location:  HEART  CARDIAC CATH LAB     CV PCI N/A 10/4/2023    Procedure: Percutaneous Coronary Intervention;  Surgeon: Rogelio Ricks MD;  Location:  HEART CARDIAC CATH LAB     EMBOLECTOMY LOWER EXTREMITY Right 10/6/2023    Procedure: Right anterior tibial bypass with graft, Right tibial endarterectomy,thrombectomy, Right doraslis pedis thrombectomy, Anterior Tibial vein patch.;  Surgeon: Chadwick Lozano MD;  Location:  OR     ENDARTERECTOMY CAROTID Right 05/11/2016    Procedure: ENDARTERECTOMY CAROTID;  Surgeon: Chadwick Lozano MD;  Location:  OR     ENDARTERECTOMY CAROTID Left 06/08/2020    Procedure: LEFT CAROTID ENDARTERECTOMY with distal facal vein patch  and EEG;  Surgeon: Chadwick Lozano MD;  Location:  OR     ESOPHAGOSCOPY, GASTROSCOPY, DUODENOSCOPY (EGD), COMBINED N/A 12/12/2023    Procedure: Esophagoscopy, gastroscopy, duodenoscopy (EGD), combined;  Surgeon: Corey Hoffman MD;  Location:  GI     FINE NEEDLE ASPIRATION WITHOUT IMAGING GUIDANCE Right 9/22/2023    Procedure: Fine needle aspiration without imaging guidance;  Surgeon: Kiersten Aguilera MD;  Location:  GI     FLUORESCENCE INTRAOPERATIVE VASCULAR ANGIOGRAPHY Right 12/28/2022    Procedure: RIGHT LEG ANGIOGRAM with intervention;  Surgeon: Chadwick Lozano MD;  Location:  OR     GYN SURGERY  left tube    left salpingectomy     IR ANGIOGRAM THROUGH CATHETER (ARTERIAL)  10/6/2023     IR ANGIOGRAM THROUGH CATHETER (ARTERIAL)  10/6/2023     IR LOWER EXTREMITY ANGIOGRAM RIGHT  05/25/2021     IR LOWER EXTREMITY ANGIOGRAM RIGHT  10/5/2023     IR OR ANGIOGRAM  6/23/2021     IR OR ANGIOGRAM  12/28/2022     LAPAROSCOPIC CHOLECYSTECTOMY N/A 09/27/2017    Procedure: LAPAROSCOPIC CHOLECYSTECTOMY;  LAPAROSCOPIC CHOLECYSTECTOMY;  Surgeon: Jacoby Tapia MD;  Location:  SD     LAPAROSCOPY DIAGNOSTIC (GENERAL) N/A 8/11/2021    Procedure: Exploratory laparoscopy,  laparoscopic lysis of adhesions, laparotomy;   Surgeon: Corina Ferreira MD;  Location:  OR     OR ANGIOGRAM, LOWER EXTREMITY Right 10/11/2023    Procedure: Or Angiogram, Lower Extremity;  Surgeon: Chadwick Lozano MD;  Location:  OR     ORTHOPEDIC SURGERY      left knee surgery     REPAIR ANEURYSM FEMORAL ARTERY Left 12/28/2022    Procedure: REPAIR LEFT FEMORAL PSEUDOANEURYSM;  Surgeon: Chadwick Lozano MD;  Location:  OR     VASCULAR SURGERY  aoto bi fem  left fem-AK pop     RUST FABRIC WRAPPING OF ABDOMINAL ANEURYSM       RUST NONSPECIFIC PROCEDURE  12/2000    angioplasty with stent right fem. a.     RUST NONSPECIFIC PROCEDURE  1987    sinus surgery     RUST NONSPECIFIC PROCEDURE  09/02/2009    Emergent left groin exploration with oversewing of bleeding angiographic site. 2. Endarterectomy of common femoral-proximal superficial femoral artery with greater saphenous vein patch angioplasty.   a. [place] of accessory right greater saphenous vein.      RUST NONSPECIFIC PROCEDURE  08/27/2009    occluded left common iliac and external iliac arteries were successfully revascularized with stenting to 8 and 7 mm         Family History   Problem Relation Age of Onset     Cancer Mother         bladder     Cardiovascular Father         alive,multiple heart attacks     Diabetes Maternal Grandmother      Lung Cancer Maternal Grandmother      Blood Disease Brother         clotting disorder       Social History     Socioeconomic History     Marital status:      Spouse name: Not on file     Number of children: Not on file     Years of education: Not on file     Highest education level: Not on file   Occupational History     Not on file   Tobacco Use     Smoking status: Some Days     Packs/day: 0.25     Years: 52.00     Additional pack years: 0.00     Total pack years: 13.00     Types: Cigarettes     Start date: 1968     Smokeless tobacco: Never     Tobacco comments:     1/2 PPD   Vaping Use     Vaping Use: Never used   Substance and Sexual Activity      Alcohol use: Not Currently     Comment: x1-2 yr     Drug use: Yes     Types: Marijuana     Comment: 2x per day     Sexual activity: Yes     Partners: Male     Birth control/protection: Surgical   Other Topics Concern     Parent/sibling w/ CABG, MI or angioplasty before 65F 55M? Not Asked   Social History Narrative     Not on file     Social Determinants of Health     Financial Resource Strain: Low Risk  (1/8/2024)    Financial Resource Strain      Within the past 12 months, have you or your family members you live with been unable to get utilities (heat, electricity) when it was really needed?: No   Food Insecurity: Low Risk  (1/8/2024)    Food Insecurity      Within the past 12 months, did you worry that your food would run out before you got money to buy more?: No      Within the past 12 months, did the food you bought just not last and you didn t have money to get more?: No   Transportation Needs: Low Risk  (1/8/2024)    Transportation Needs      Within the past 12 months, has lack of transportation kept you from medical appointments, getting your medicines, non-medical meetings or appointments, work, or from getting things that you need?: No   Physical Activity: Not on file   Stress: Not on file   Social Connections: Not on file   Interpersonal Safety: Low Risk  (11/13/2023)    Interpersonal Safety      Do you feel physically and emotionally safe where you currently live?: Yes      Within the past 12 months, have you been hit, slapped, kicked or otherwise physically hurt by someone?: No      Within the past 12 months, have you been humiliated or emotionally abused in other ways by your partner or ex-partner?: No   Housing Stability: Low Risk  (1/8/2024)    Housing Stability      Do you have housing? : Yes      Are you worried about losing your housing?: No       Outpatient Encounter Medications as of 3/28/2024   Medication Sig Dispense Refill     acetaminophen (TYLENOL) 500 MG tablet Take 500-1,000 mg by mouth  every 6 hours as needed for mild pain       amLODIPine (NORVASC) 2.5 MG tablet Take 1 tablet (2.5 mg) by mouth daily 30 tablet 4     augmented betamethasone dipropionate (DIPROLENE AF) 0.05 % external cream Apply topically 2 times daily as needed (skin rash) 50 g 2     Calcium Carb-Cholecalciferol (CALCIUM CARBONATE-VITAMIN D3) 600-400 MG-UNIT TABS TAKE ONE TABLET BY MOUTH TWICE DAILY 180 tablet 3     carvedilol (COREG) 6.25 MG tablet Take 1 tablet (6.25 mg) by mouth 2 times daily (with meals) 60 tablet 11     clopidogrel (PLAVIX) 75 MG tablet Take 1 tablet (75 mg) by mouth daily 90 tablet 3     diphenhydrAMINE (BENADRYL) 25 MG tablet take Benadryl 25-50 mg one hour prior to the procedure 2 tablet 0     empagliflozin (JARDIANCE) 10 MG TABS tablet Take 1 tablet (10 mg) by mouth daily 90 tablet 1     ferrous sulfate (FEROSUL) 325 (65 Fe) MG tablet Take 1 tablet (325 mg) by mouth every other day 60 tablet 1     fluticasone-vilanterol (BREO ELLIPTA) 200-25 MCG/ACT inhaler Inhale 1 puff into the lungs daily 120 each 11     gabapentin (NEURONTIN) 100 MG capsule Take 1 capsule (100 mg) by mouth 3 times daily 270 capsule 3     lisinopril (ZESTRIL) 10 MG tablet Take 1 tablet (10 mg) by mouth daily 30 tablet 4     nicotine (NICODERM CQ) 14 MG/24HR 24 hr patch Place 1 patch onto the skin daily 30 patch 1     nitroGLYcerin (NITROSTAT) 0.4 MG sublingual tablet Place 1 tablet (0.4 mg) under the tongue See Admin Instructions for chest pain 30 tablet 11     omeprazole (PRILOSEC) 20 MG DR capsule TAKE ONE CAPSULE BY MOUTH DAILY 90 capsule 3     polyethylene glycol (MIRALAX) 17 GM/Dose powder Take 17 g by mouth daily Hold for diarrhea or loose stools (Patient taking differently: Take 17 g by mouth daily as needed for constipation Hold for diarrhea or loose stools) 510 g 0     rivaroxaban ANTICOAGULANT (XARELTO) 15 MG TABS tablet Take 1 tablet (15 mg) by mouth daily (with dinner) 90 tablet 0     rosuvastatin (CRESTOR) 40 MG tablet  Take 1 tablet (40 mg) by mouth At Bedtime 90 tablet 3     senna-docusate (SENOKOT-S/PERICOLACE) 8.6-50 MG tablet Take 1 tablet by mouth 2 times daily       senna-docusate (SENOKOT-S/PERICOLACE) 8.6-50 MG tablet Take 1 tablet by mouth daily as needed for constipation 30 tablet 0     triamcinolone (KENALOG) 0.1 % external ointment Apply topically 2 times daily as needed for irritation Apply to back 60 g 3     No facility-administered encounter medications on file as of 3/28/2024.             O:   NAD, WDWN, Alert & Oriented, Mood & Affect wnl, Vitals stable   General appearance normal   Vitals stable   Alert, oriented and in no acute distress     Eczematous plaques and dermatographia on trunk       Eyes: Conjunctivae/lids:Normal     ENT: Lips, buccal mucosa, tongue: normal    MSK:Normal    Cardiovascular: peripheral edema none    Pulm: Breathing Normal    Neuro/Psych: Orientation:Alert and Orientedx3 ; Mood/Affect:normal       A/P:  Spong derm and dermatographia  Skin care discussed with patient   Claritin in am  Zyrtec pm   Betamethasone twice daily  Return to clinic 6 weeks  It was a pleasure speaking to Shirley Hendricks today.  Previous clinic notes and pertinent laboratory tests were reviewed prior to Shirley Hendricks's visit.  Skin care regimen reviewed with patient: Eliminate harsh soaps, i.e. Dial, zest, irsih spring; Mild soaps such as Cetaphil or Dove sensitive skin, avoid hot or cold showers, aggressive use of emollients including vanicream, cetaphil or cerave discussed with patient.        Again, thank you for allowing me to participate in the care of your patient.        Sincerely,        Jacoby Fischer MD

## 2024-03-29 ENCOUNTER — APPOINTMENT (OUTPATIENT)
Dept: ULTRASOUND IMAGING | Facility: CLINIC | Age: 64
DRG: 270 | End: 2024-03-29
Attending: EMERGENCY MEDICINE
Payer: COMMERCIAL

## 2024-03-29 ENCOUNTER — APPOINTMENT (OUTPATIENT)
Dept: INTERVENTIONAL RADIOLOGY/VASCULAR | Facility: CLINIC | Age: 64
DRG: 270 | End: 2024-03-29
Attending: SURGERY
Payer: COMMERCIAL

## 2024-03-29 ENCOUNTER — HOSPITAL ENCOUNTER (INPATIENT)
Facility: CLINIC | Age: 64
LOS: 24 days | Discharge: NURSING, REHAB OR LONG-TERM CARE | DRG: 270 | End: 2024-04-22
Attending: EMERGENCY MEDICINE | Admitting: STUDENT IN AN ORGANIZED HEALTH CARE EDUCATION/TRAINING PROGRAM
Payer: COMMERCIAL

## 2024-03-29 DIAGNOSIS — F41.9 ANXIETY: Primary | ICD-10-CM

## 2024-03-29 DIAGNOSIS — S78.111A ABOVE KNEE AMPUTATION OF RIGHT LOWER EXTREMITY (H): ICD-10-CM

## 2024-03-29 DIAGNOSIS — S81.802A WOUND OF LEFT LOWER EXTREMITY, INITIAL ENCOUNTER: ICD-10-CM

## 2024-03-29 DIAGNOSIS — R11.0 NAUSEA: ICD-10-CM

## 2024-03-29 DIAGNOSIS — I70.90 ARTERIAL OCCLUSION: ICD-10-CM

## 2024-03-29 DIAGNOSIS — L89.159 PRESSURE INJURY OF SKIN OF SACRAL REGION, UNSPECIFIED INJURY STAGE: ICD-10-CM

## 2024-03-29 DIAGNOSIS — I25.10 CORONARY ARTERY DISEASE INVOLVING NATIVE CORONARY ARTERY OF NATIVE HEART WITHOUT ANGINA PECTORIS: ICD-10-CM

## 2024-03-29 DIAGNOSIS — R19.7 DIARRHEA, UNSPECIFIED TYPE: ICD-10-CM

## 2024-03-29 DIAGNOSIS — Z99.2 DIALYSIS PATIENT (H): ICD-10-CM

## 2024-03-29 DIAGNOSIS — K59.00 CONSTIPATION, UNSPECIFIED CONSTIPATION TYPE: ICD-10-CM

## 2024-03-29 DIAGNOSIS — I10 ESSENTIAL HYPERTENSION: ICD-10-CM

## 2024-03-29 LAB
ANION GAP SERPL CALCULATED.3IONS-SCNC: 14 MMOL/L (ref 7–15)
BASOPHILS # BLD AUTO: 0.1 10E3/UL (ref 0–0.2)
BASOPHILS NFR BLD AUTO: 1 %
BUN SERPL-MCNC: 12.3 MG/DL (ref 8–23)
CALCIUM SERPL-MCNC: 9 MG/DL (ref 8.8–10.2)
CHLORIDE SERPL-SCNC: 97 MMOL/L (ref 98–107)
CREAT SERPL-MCNC: 0.67 MG/DL (ref 0.51–0.95)
DEPRECATED HCO3 PLAS-SCNC: 21 MMOL/L (ref 22–29)
EGFRCR SERPLBLD CKD-EPI 2021: >90 ML/MIN/1.73M2
EOSINOPHIL # BLD AUTO: 0.2 10E3/UL (ref 0–0.7)
EOSINOPHIL NFR BLD AUTO: 3 %
ERYTHROCYTE [DISTWIDTH] IN BLOOD BY AUTOMATED COUNT: 15.7 % (ref 10–15)
GLUCOSE BLDC GLUCOMTR-MCNC: 101 MG/DL (ref 70–99)
GLUCOSE SERPL-MCNC: 103 MG/DL (ref 70–99)
HCT VFR BLD AUTO: 36.3 % (ref 35–47)
HGB BLD-MCNC: 11.8 G/DL (ref 11.7–15.7)
IMM GRANULOCYTES # BLD: 0 10E3/UL
IMM GRANULOCYTES NFR BLD: 0 %
LYMPHOCYTES # BLD AUTO: 0.9 10E3/UL (ref 0.8–5.3)
LYMPHOCYTES NFR BLD AUTO: 15 %
MCH RBC QN AUTO: 28 PG (ref 26.5–33)
MCHC RBC AUTO-ENTMCNC: 32.5 G/DL (ref 31.5–36.5)
MCV RBC AUTO: 86 FL (ref 78–100)
MONOCYTES # BLD AUTO: 0.6 10E3/UL (ref 0–1.3)
MONOCYTES NFR BLD AUTO: 10 %
NEUTROPHILS # BLD AUTO: 4.2 10E3/UL (ref 1.6–8.3)
NEUTROPHILS NFR BLD AUTO: 71 %
NRBC # BLD AUTO: 0 10E3/UL
NRBC BLD AUTO-RTO: 0 /100
PLATELET # BLD AUTO: 195 10E3/UL (ref 150–450)
POTASSIUM SERPL-SCNC: 4.2 MMOL/L (ref 3.4–5.3)
RBC # BLD AUTO: 4.21 10E6/UL (ref 3.8–5.2)
SODIUM SERPL-SCNC: 132 MMOL/L (ref 135–145)
WBC # BLD AUTO: 6 10E3/UL (ref 4–11)

## 2024-03-29 PROCEDURE — 99222 1ST HOSP IP/OBS MODERATE 55: CPT | Performed by: SURGERY

## 2024-03-29 PROCEDURE — 272N000116 HC CATH CR1

## 2024-03-29 PROCEDURE — 272N000570 HC SHEATH CR7

## 2024-03-29 PROCEDURE — 36415 COLL VENOUS BLD VENIPUNCTURE: CPT | Performed by: EMERGENCY MEDICINE

## 2024-03-29 PROCEDURE — C1769 GUIDE WIRE: HCPCS

## 2024-03-29 PROCEDURE — 75710 ARTERY X-RAYS ARM/LEG: CPT | Mod: RT

## 2024-03-29 PROCEDURE — 250N000009 HC RX 250: Performed by: RADIOLOGY

## 2024-03-29 PROCEDURE — 272N000566 HC SHEATH CR3

## 2024-03-29 PROCEDURE — 272N000124 HC CATH CR11

## 2024-03-29 PROCEDURE — 80048 BASIC METABOLIC PNL TOTAL CA: CPT | Performed by: EMERGENCY MEDICINE

## 2024-03-29 PROCEDURE — 250N000011 HC RX IP 250 OP 636: Performed by: PHYSICIAN ASSISTANT

## 2024-03-29 PROCEDURE — 255N000002 HC RX 255 OP 636: Performed by: RADIOLOGY

## 2024-03-29 PROCEDURE — 93926 LOWER EXTREMITY STUDY: CPT | Mod: RT

## 2024-03-29 PROCEDURE — 272N000196 HC ACCESSORY CR5

## 2024-03-29 PROCEDURE — 76937 US GUIDE VASCULAR ACCESS: CPT

## 2024-03-29 PROCEDURE — 85048 AUTOMATED LEUKOCYTE COUNT: CPT | Performed by: EMERGENCY MEDICINE

## 2024-03-29 PROCEDURE — 272N000194 HC ACCESSORY CR3

## 2024-03-29 PROCEDURE — 250N000011 HC RX IP 250 OP 636: Performed by: RADIOLOGY

## 2024-03-29 PROCEDURE — 99223 1ST HOSP IP/OBS HIGH 75: CPT | Mod: AI | Performed by: PHYSICIAN ASSISTANT

## 2024-03-29 PROCEDURE — 258N000003 HC RX IP 258 OP 636: Performed by: RADIOLOGY

## 2024-03-29 PROCEDURE — B41F1ZZ FLUOROSCOPY OF RIGHT LOWER EXTREMITY ARTERIES USING LOW OSMOLAR CONTRAST: ICD-10-PCS | Performed by: RADIOLOGY

## 2024-03-29 PROCEDURE — 200N000001 HC R&B ICU

## 2024-03-29 PROCEDURE — 250N000011 HC RX IP 250 OP 636: Performed by: HOSPITALIST

## 2024-03-29 PROCEDURE — 250N000011 HC RX IP 250 OP 636

## 2024-03-29 PROCEDURE — 99285 EMERGENCY DEPT VISIT HI MDM: CPT

## 2024-03-29 PROCEDURE — 3E03317 INTRODUCTION OF OTHER THROMBOLYTIC INTO PERIPHERAL VEIN, PERCUTANEOUS APPROACH: ICD-10-PCS | Performed by: RADIOLOGY

## 2024-03-29 RX ORDER — NALOXONE HYDROCHLORIDE 0.4 MG/ML
0.2 INJECTION, SOLUTION INTRAMUSCULAR; INTRAVENOUS; SUBCUTANEOUS
Status: DISCONTINUED | OUTPATIENT
Start: 2024-03-29 | End: 2024-03-29 | Stop reason: HOSPADM

## 2024-03-29 RX ORDER — SODIUM CHLORIDE 9 MG/ML
INJECTION, SOLUTION INTRAVENOUS CONTINUOUS
Status: DISCONTINUED | OUTPATIENT
Start: 2024-03-30 | End: 2024-03-31

## 2024-03-29 RX ORDER — AMOXICILLIN 250 MG
2 CAPSULE ORAL 2 TIMES DAILY PRN
Status: DISCONTINUED | OUTPATIENT
Start: 2024-03-29 | End: 2024-04-08

## 2024-03-29 RX ORDER — ONDANSETRON 2 MG/ML
4 INJECTION INTRAMUSCULAR; INTRAVENOUS EVERY 6 HOURS PRN
Status: DISCONTINUED | OUTPATIENT
Start: 2024-03-29 | End: 2024-03-29

## 2024-03-29 RX ORDER — NALOXONE HYDROCHLORIDE 0.4 MG/ML
0.4 INJECTION, SOLUTION INTRAMUSCULAR; INTRAVENOUS; SUBCUTANEOUS
Status: DISCONTINUED | OUTPATIENT
Start: 2024-03-29 | End: 2024-04-22 | Stop reason: HOSPADM

## 2024-03-29 RX ORDER — BISACODYL 10 MG
10 SUPPOSITORY, RECTAL RECTAL DAILY PRN
Status: DISCONTINUED | OUTPATIENT
Start: 2024-03-29 | End: 2024-04-01

## 2024-03-29 RX ORDER — AMOXICILLIN 250 MG
1 CAPSULE ORAL 2 TIMES DAILY PRN
Status: DISCONTINUED | OUTPATIENT
Start: 2024-03-29 | End: 2024-04-08

## 2024-03-29 RX ORDER — NICOTINE POLACRILEX 4 MG
15-30 LOZENGE BUCCAL
Status: DISCONTINUED | OUTPATIENT
Start: 2024-03-29 | End: 2024-04-22 | Stop reason: HOSPADM

## 2024-03-29 RX ORDER — POLYETHYLENE GLYCOL 3350 17 G/17G
17 POWDER, FOR SOLUTION ORAL DAILY PRN
Status: DISCONTINUED | OUTPATIENT
Start: 2024-03-29 | End: 2024-04-22 | Stop reason: HOSPADM

## 2024-03-29 RX ORDER — METHYLPREDNISOLONE 16 MG/1
32 TABLET ORAL ONCE
Status: DISCONTINUED | OUTPATIENT
Start: 2024-03-29 | End: 2024-03-29 | Stop reason: HOSPADM

## 2024-03-29 RX ORDER — LABETALOL HYDROCHLORIDE 5 MG/ML
10 INJECTION, SOLUTION INTRAVENOUS
Status: DISCONTINUED | OUTPATIENT
Start: 2024-03-29 | End: 2024-04-03

## 2024-03-29 RX ORDER — ACETAMINOPHEN 325 MG/1
650 TABLET ORAL EVERY 4 HOURS PRN
Status: DISCONTINUED | OUTPATIENT
Start: 2024-03-29 | End: 2024-04-01

## 2024-03-29 RX ORDER — NALOXONE HYDROCHLORIDE 0.4 MG/ML
0.4 INJECTION, SOLUTION INTRAMUSCULAR; INTRAVENOUS; SUBCUTANEOUS
Status: DISCONTINUED | OUTPATIENT
Start: 2024-03-29 | End: 2024-03-29 | Stop reason: HOSPADM

## 2024-03-29 RX ORDER — METOCLOPRAMIDE HYDROCHLORIDE 5 MG/ML
5 INJECTION INTRAMUSCULAR; INTRAVENOUS EVERY 6 HOURS PRN
Status: DISCONTINUED | OUTPATIENT
Start: 2024-03-29 | End: 2024-03-29

## 2024-03-29 RX ORDER — BETAMETHASONE DIPROPIONATE 0.5 MG/G
CREAM TOPICAL 2 TIMES DAILY
Status: DISCONTINUED | OUTPATIENT
Start: 2024-03-29 | End: 2024-03-29

## 2024-03-29 RX ORDER — ACETAMINOPHEN 325 MG/1
975 TABLET ORAL EVERY 8 HOURS
Status: DISCONTINUED | OUTPATIENT
Start: 2024-03-29 | End: 2024-04-01

## 2024-03-29 RX ORDER — HEPARIN SODIUM 200 [USP'U]/100ML
1 INJECTION, SOLUTION INTRAVENOUS CONTINUOUS PRN
Status: DISCONTINUED | OUTPATIENT
Start: 2024-03-29 | End: 2024-03-29 | Stop reason: HOSPADM

## 2024-03-29 RX ORDER — DIPHENHYDRAMINE HYDROCHLORIDE 50 MG/ML
50 INJECTION INTRAMUSCULAR; INTRAVENOUS ONCE
Status: COMPLETED | OUTPATIENT
Start: 2024-03-29 | End: 2024-03-29

## 2024-03-29 RX ORDER — HYDRALAZINE HYDROCHLORIDE 20 MG/ML
10 INJECTION INTRAMUSCULAR; INTRAVENOUS ONCE
Status: COMPLETED | OUTPATIENT
Start: 2024-03-29 | End: 2024-03-29

## 2024-03-29 RX ORDER — AMOXICILLIN 250 MG
1 CAPSULE ORAL 2 TIMES DAILY
Status: DISCONTINUED | OUTPATIENT
Start: 2024-03-29 | End: 2024-04-01

## 2024-03-29 RX ORDER — SODIUM CHLORIDE 9 MG/ML
INJECTION, SOLUTION INTRAVENOUS CONTINUOUS
Status: DISCONTINUED | OUTPATIENT
Start: 2024-03-29 | End: 2024-03-30

## 2024-03-29 RX ORDER — METHYLPREDNISOLONE SODIUM SUCCINATE 40 MG/ML
40 INJECTION, POWDER, LYOPHILIZED, FOR SOLUTION INTRAMUSCULAR; INTRAVENOUS EVERY 4 HOURS
Status: DISCONTINUED | OUTPATIENT
Start: 2024-03-29 | End: 2024-03-29 | Stop reason: HOSPADM

## 2024-03-29 RX ORDER — FENTANYL CITRATE 50 UG/ML
25-50 INJECTION, SOLUTION INTRAMUSCULAR; INTRAVENOUS EVERY 5 MIN PRN
Status: DISCONTINUED | OUTPATIENT
Start: 2024-03-29 | End: 2024-03-29 | Stop reason: HOSPADM

## 2024-03-29 RX ORDER — CARVEDILOL 6.25 MG/1
6.25 TABLET ORAL 2 TIMES DAILY WITH MEALS
Status: DISCONTINUED | OUTPATIENT
Start: 2024-03-29 | End: 2024-04-22 | Stop reason: HOSPADM

## 2024-03-29 RX ORDER — FLUTICASONE FUROATE AND VILANTEROL 200; 25 UG/1; UG/1
1 POWDER RESPIRATORY (INHALATION) DAILY
Status: DISCONTINUED | OUTPATIENT
Start: 2024-03-30 | End: 2024-04-22 | Stop reason: HOSPADM

## 2024-03-29 RX ORDER — POLYETHYLENE GLYCOL 3350 17 G/17G
17 POWDER, FOR SOLUTION ORAL DAILY
Status: DISCONTINUED | OUTPATIENT
Start: 2024-03-30 | End: 2024-04-01

## 2024-03-29 RX ORDER — HYDROMORPHONE HYDROCHLORIDE 1 MG/ML
0.5 INJECTION, SOLUTION INTRAMUSCULAR; INTRAVENOUS; SUBCUTANEOUS
Status: DISCONTINUED | OUTPATIENT
Start: 2024-03-29 | End: 2024-03-29

## 2024-03-29 RX ORDER — DOCUSATE SODIUM 100 MG/1
100 CAPSULE, LIQUID FILLED ORAL 2 TIMES DAILY
Status: DISCONTINUED | OUTPATIENT
Start: 2024-03-30 | End: 2024-03-29

## 2024-03-29 RX ORDER — ROSUVASTATIN CALCIUM 20 MG/1
40 TABLET, COATED ORAL AT BEDTIME
Status: DISCONTINUED | OUTPATIENT
Start: 2024-03-29 | End: 2024-03-31

## 2024-03-29 RX ORDER — NICOTINE 21 MG/24HR
1 PATCH, TRANSDERMAL 24 HOURS TRANSDERMAL DAILY
Status: DISCONTINUED | OUTPATIENT
Start: 2024-03-29 | End: 2024-04-22 | Stop reason: HOSPADM

## 2024-03-29 RX ORDER — OXYCODONE HYDROCHLORIDE 5 MG/1
5 TABLET ORAL EVERY 4 HOURS PRN
Status: DISCONTINUED | OUTPATIENT
Start: 2024-03-29 | End: 2024-03-29

## 2024-03-29 RX ORDER — PROCHLORPERAZINE 25 MG
12.5 SUPPOSITORY, RECTAL RECTAL EVERY 12 HOURS PRN
Status: DISCONTINUED | OUTPATIENT
Start: 2024-03-29 | End: 2024-03-29

## 2024-03-29 RX ORDER — METHYLPREDNISOLONE 16 MG/1
32 TABLET ORAL ONCE
Status: DISCONTINUED | OUTPATIENT
Start: 2024-03-30 | End: 2024-03-29 | Stop reason: HOSPADM

## 2024-03-29 RX ORDER — METOCLOPRAMIDE 5 MG/1
5 TABLET ORAL EVERY 6 HOURS PRN
Status: DISCONTINUED | OUTPATIENT
Start: 2024-03-29 | End: 2024-03-29

## 2024-03-29 RX ORDER — ACETAMINOPHEN 325 MG/10.15ML
650 LIQUID ORAL EVERY 4 HOURS PRN
Status: DISCONTINUED | OUTPATIENT
Start: 2024-03-29 | End: 2024-04-01

## 2024-03-29 RX ORDER — AMOXICILLIN 250 MG
2 CAPSULE ORAL 2 TIMES DAILY
Status: DISCONTINUED | OUTPATIENT
Start: 2024-03-29 | End: 2024-04-01

## 2024-03-29 RX ORDER — LIDOCAINE 40 MG/G
CREAM TOPICAL
Status: DISCONTINUED | OUTPATIENT
Start: 2024-03-29 | End: 2024-03-29 | Stop reason: HOSPADM

## 2024-03-29 RX ORDER — HEPARIN SODIUM 10000 [USP'U]/100ML
500 INJECTION, SOLUTION INTRAVENOUS CONTINUOUS
Status: DISCONTINUED | OUTPATIENT
Start: 2024-03-29 | End: 2024-03-31

## 2024-03-29 RX ORDER — HYDROMORPHONE HCL IN WATER/PF 6 MG/30 ML
0.2 PATIENT CONTROLLED ANALGESIA SYRINGE INTRAVENOUS
Status: DISCONTINUED | OUTPATIENT
Start: 2024-03-29 | End: 2024-03-29

## 2024-03-29 RX ORDER — NALOXONE HYDROCHLORIDE 0.4 MG/ML
0.2 INJECTION, SOLUTION INTRAMUSCULAR; INTRAVENOUS; SUBCUTANEOUS
Status: DISCONTINUED | OUTPATIENT
Start: 2024-03-29 | End: 2024-04-22 | Stop reason: HOSPADM

## 2024-03-29 RX ORDER — HYDRALAZINE HYDROCHLORIDE 20 MG/ML
10 INJECTION INTRAMUSCULAR; INTRAVENOUS EVERY 6 HOURS PRN
Status: DISCONTINUED | OUTPATIENT
Start: 2024-03-29 | End: 2024-03-29

## 2024-03-29 RX ORDER — SODIUM CHLORIDE 9 MG/ML
INJECTION, SOLUTION INTRAVENOUS CONTINUOUS
Status: DISCONTINUED | OUTPATIENT
Start: 2024-03-29 | End: 2024-03-29

## 2024-03-29 RX ORDER — GABAPENTIN 100 MG/1
100 CAPSULE ORAL 3 TIMES DAILY
Status: DISCONTINUED | OUTPATIENT
Start: 2024-03-29 | End: 2024-04-06

## 2024-03-29 RX ORDER — ONDANSETRON 4 MG/1
4 TABLET, ORALLY DISINTEGRATING ORAL EVERY 6 HOURS PRN
Status: DISCONTINUED | OUTPATIENT
Start: 2024-03-29 | End: 2024-03-29

## 2024-03-29 RX ORDER — ONDANSETRON 2 MG/ML
4 INJECTION INTRAMUSCULAR; INTRAVENOUS EVERY 6 HOURS PRN
Status: DISCONTINUED | OUTPATIENT
Start: 2024-03-29 | End: 2024-04-01

## 2024-03-29 RX ORDER — METOCLOPRAMIDE HYDROCHLORIDE 5 MG/ML
5 INJECTION INTRAMUSCULAR; INTRAVENOUS EVERY 6 HOURS PRN
Status: DISCONTINUED | OUTPATIENT
Start: 2024-03-29 | End: 2024-04-19

## 2024-03-29 RX ORDER — METOCLOPRAMIDE 5 MG/1
5 TABLET ORAL EVERY 6 HOURS PRN
Status: DISCONTINUED | OUTPATIENT
Start: 2024-03-29 | End: 2024-04-22 | Stop reason: HOSPADM

## 2024-03-29 RX ORDER — DIPHENHYDRAMINE HCL 25 MG
50 TABLET ORAL DAILY PRN
Status: DISCONTINUED | OUTPATIENT
Start: 2024-03-29 | End: 2024-03-29

## 2024-03-29 RX ORDER — IODIXANOL 320 MG/ML
100 INJECTION, SOLUTION INTRAVASCULAR ONCE
Status: COMPLETED | OUTPATIENT
Start: 2024-03-29 | End: 2024-03-29

## 2024-03-29 RX ORDER — PROCHLORPERAZINE 25 MG
12.5 SUPPOSITORY, RECTAL RECTAL EVERY 12 HOURS PRN
Status: DISCONTINUED | OUTPATIENT
Start: 2024-03-29 | End: 2024-04-01

## 2024-03-29 RX ORDER — HYDROMORPHONE HYDROCHLORIDE 1 MG/ML
.3-.5 INJECTION, SOLUTION INTRAMUSCULAR; INTRAVENOUS; SUBCUTANEOUS
Status: DISCONTINUED | OUTPATIENT
Start: 2024-03-29 | End: 2024-03-29

## 2024-03-29 RX ORDER — PROCHLORPERAZINE MALEATE 5 MG
5 TABLET ORAL EVERY 6 HOURS PRN
Status: DISCONTINUED | OUTPATIENT
Start: 2024-03-29 | End: 2024-04-01

## 2024-03-29 RX ORDER — BETAMETHASONE DIPROPIONATE 0.5 MG/G
CREAM TOPICAL 2 TIMES DAILY
Status: DISCONTINUED | OUTPATIENT
Start: 2024-03-29 | End: 2024-04-22 | Stop reason: HOSPADM

## 2024-03-29 RX ORDER — DEXTROSE MONOHYDRATE 25 G/50ML
25-50 INJECTION, SOLUTION INTRAVENOUS
Status: DISCONTINUED | OUTPATIENT
Start: 2024-03-29 | End: 2024-04-22 | Stop reason: HOSPADM

## 2024-03-29 RX ORDER — DOCUSATE SODIUM 100 MG/1
100 CAPSULE, LIQUID FILLED ORAL 2 TIMES DAILY
Status: DISCONTINUED | OUTPATIENT
Start: 2024-03-29 | End: 2024-04-04

## 2024-03-29 RX ORDER — HYDRALAZINE HYDROCHLORIDE 20 MG/ML
10 INJECTION INTRAMUSCULAR; INTRAVENOUS EVERY 4 HOURS PRN
Status: DISCONTINUED | OUTPATIENT
Start: 2024-03-29 | End: 2024-03-31

## 2024-03-29 RX ORDER — LISINOPRIL 10 MG/1
10 TABLET ORAL DAILY
Status: DISCONTINUED | OUTPATIENT
Start: 2024-03-30 | End: 2024-03-31

## 2024-03-29 RX ORDER — PROCHLORPERAZINE MALEATE 5 MG
5 TABLET ORAL EVERY 6 HOURS PRN
Status: DISCONTINUED | OUTPATIENT
Start: 2024-03-29 | End: 2024-03-29

## 2024-03-29 RX ORDER — FLUMAZENIL 0.1 MG/ML
0.2 INJECTION, SOLUTION INTRAVENOUS
Status: DISCONTINUED | OUTPATIENT
Start: 2024-03-29 | End: 2024-03-29 | Stop reason: HOSPADM

## 2024-03-29 RX ORDER — ONDANSETRON 4 MG/1
4 TABLET, ORALLY DISINTEGRATING ORAL EVERY 6 HOURS PRN
Status: DISCONTINUED | OUTPATIENT
Start: 2024-03-29 | End: 2024-04-01

## 2024-03-29 RX ORDER — AMLODIPINE BESYLATE 2.5 MG/1
2.5 TABLET ORAL AT BEDTIME
Status: DISCONTINUED | OUTPATIENT
Start: 2024-03-29 | End: 2024-04-03

## 2024-03-29 RX ADMIN — IODIXANOL 60 ML: 320 INJECTION, SOLUTION INTRAVASCULAR at 22:41

## 2024-03-29 RX ADMIN — DIPHENHYDRAMINE HYDROCHLORIDE 50 MG: 50 INJECTION, SOLUTION INTRAMUSCULAR; INTRAVENOUS at 20:05

## 2024-03-29 RX ADMIN — HEPARIN SODIUM 500 UNITS/HR: 10000 INJECTION, SOLUTION INTRAVENOUS at 22:37

## 2024-03-29 RX ADMIN — ALTEPLASE 4 MG: 2.2 INJECTION, POWDER, LYOPHILIZED, FOR SOLUTION INTRAVENOUS at 22:16

## 2024-03-29 RX ADMIN — MIDAZOLAM 1 MG: 1 INJECTION INTRAMUSCULAR; INTRAVENOUS at 21:22

## 2024-03-29 RX ADMIN — FENTANYL CITRATE 50 MCG: 50 INJECTION, SOLUTION INTRAMUSCULAR; INTRAVENOUS at 21:26

## 2024-03-29 RX ADMIN — MIDAZOLAM 0.5 MG: 1 INJECTION INTRAMUSCULAR; INTRAVENOUS at 22:31

## 2024-03-29 RX ADMIN — Medication: at 23:57

## 2024-03-29 RX ADMIN — ALTEPLASE 0.5 MG/HR: 2.2 INJECTION, POWDER, LYOPHILIZED, FOR SOLUTION INTRAVENOUS at 22:36

## 2024-03-29 RX ADMIN — MIDAZOLAM 0.5 MG: 1 INJECTION INTRAMUSCULAR; INTRAVENOUS at 21:47

## 2024-03-29 RX ADMIN — FENTANYL CITRATE 25 MCG: 50 INJECTION, SOLUTION INTRAMUSCULAR; INTRAVENOUS at 21:49

## 2024-03-29 RX ADMIN — FENTANYL CITRATE 25 MCG: 50 INJECTION, SOLUTION INTRAMUSCULAR; INTRAVENOUS at 21:32

## 2024-03-29 RX ADMIN — MIDAZOLAM 0.5 MG: 1 INJECTION INTRAMUSCULAR; INTRAVENOUS at 21:27

## 2024-03-29 RX ADMIN — METHYLPREDNISOLONE SODIUM SUCCINATE 40 MG: 40 INJECTION, POWDER, FOR SOLUTION INTRAMUSCULAR; INTRAVENOUS at 20:07

## 2024-03-29 RX ADMIN — HYDRALAZINE HYDROCHLORIDE 10 MG: 20 INJECTION INTRAMUSCULAR; INTRAVENOUS at 20:32

## 2024-03-29 RX ADMIN — MIDAZOLAM 0.5 MG: 1 INJECTION INTRAMUSCULAR; INTRAVENOUS at 22:00

## 2024-03-29 RX ADMIN — HEPARIN SODIUM 5 BAG: 200 INJECTION, SOLUTION INTRAVENOUS at 21:33

## 2024-03-29 RX ADMIN — MIDAZOLAM 0.5 MG: 1 INJECTION INTRAMUSCULAR; INTRAVENOUS at 21:37

## 2024-03-29 RX ADMIN — HYDRALAZINE HYDROCHLORIDE 10 MG: 20 INJECTION INTRAMUSCULAR; INTRAVENOUS at 21:12

## 2024-03-29 RX ADMIN — LIDOCAINE HYDROCHLORIDE 27 ML: 10 INJECTION, SOLUTION INFILTRATION; PERINEURAL at 21:29

## 2024-03-29 ASSESSMENT — COLUMBIA-SUICIDE SEVERITY RATING SCALE - C-SSRS
6. HAVE YOU EVER DONE ANYTHING, STARTED TO DO ANYTHING, OR PREPARED TO DO ANYTHING TO END YOUR LIFE?: NO
2. HAVE YOU ACTUALLY HAD ANY THOUGHTS OF KILLING YOURSELF IN THE PAST MONTH?: NO
1. IN THE PAST MONTH, HAVE YOU WISHED YOU WERE DEAD OR WISHED YOU COULD GO TO SLEEP AND NOT WAKE UP?: NO

## 2024-03-29 ASSESSMENT — ACTIVITIES OF DAILY LIVING (ADL)
ADLS_ACUITY_SCORE: 38

## 2024-03-29 NOTE — PHARMACY-ADMISSION MEDICATION HISTORY
Pharmacist Admission Medication History    Admission medication history is complete. The information provided in this note is only as accurate as the sources available at the time of the update.    Information Source(s): Patient and CareEverywhere/SureScripts via in-person    Pertinent Information:   -She has unintentionally missed taking amlodipine for about 1 month  -She is not sure if taking empagliflozin - name of medication and indication not familiar; last fill [facility]s 1/25/24 for 90 days  -Dermatology recommended Claritin for back rash, she started Claritin-D because also has some nose congestion (unsure if 12hr vs 24hr formulation). Derm also recommended Zyrtec but patient unsure if taking Zyrtec or Xyzal.    Changes made to PTA medication list:  Added: Claritin-D, Zyrtec vs Xyzal  Deleted: None  Changed: None    Allergies reviewed with patient and updates made in EHR: yes    Medication History Completed By: Yanira Mario [facility] 3/29/2024 6:53 PM    Prior to Admission medications    Medication Sig Last Dose Taking? Auth Provider Long Term End Date   acetaminophen (TYLENOL) 500 MG tablet Take 500-1,000 mg by mouth every 6 hours as needed for mild pain  Yes Reported, Patient No    amLODIPine (NORVASC) 2.5 MG tablet Take 1 tablet (2.5 mg) by mouth daily  Patient taking differently: Take 2.5 mg by mouth at bedtime Past Month at Unintentionally missed ~1 month Yes Mary Silva, APRN CNP Yes    augmented betamethasone dipropionate (DIPROLENE AF) 0.05 % external cream Apply topically 2 times daily as needed (skin rash)  Yes Vasquez Benoit MD     Calcium Carb-Cholecalciferol (CALCIUM CARBONATE-VITAMIN D3) 600-400 MG-UNIT TABS TAKE ONE TABLET BY MOUTH TWICE DAILY Past Month at Ran out ~1 week ago Yes Vasquez Benoit MD     carvedilol (COREG) 6.25 MG tablet Take 1 tablet (6.25 mg) by mouth 2 times daily (with meals) 3/29/2024 at am Yes Vasquez Benoit MD Yes    clopidogrel (PLAVIX) 75 MG tablet Take 1 tablet  (75 mg) by mouth daily 3/29/2024 at am Yes Vasquez Benoit MD Yes    fluticasone-vilanterol (BREO ELLIPTA) 200-25 MCG/ACT inhaler Inhale 1 puff into the lungs daily 3/29/2024 Yes Vasquez Benoit MD     gabapentin (NEURONTIN) 100 MG capsule Take 1 capsule (100 mg) by mouth 3 times daily 3/29/2024 at am Yes Vasquez Benoit MD Yes    lisinopril (ZESTRIL) 10 MG tablet Take 1 tablet (10 mg) by mouth daily 3/29/2024 at am Yes Mary Silva APRN CNP Yes    nicotine (NICODERM CQ) 14 MG/24HR 24 hr patch Place 1 patch onto the skin daily  Patient taking differently: Place 1 patch onto the skin daily as needed for nicotine withdrawal symptoms  at does not have on Yes Chadwick Lozano MD     omeprazole (PRILOSEC) 20 MG DR capsule TAKE ONE CAPSULE BY MOUTH DAILY 3/29/2024 at am Yes Vasquez Benoit MD     rivaroxaban ANTICOAGULANT (XARELTO) 15 MG TABS tablet Take 1 tablet (15 mg) by mouth daily (with dinner) 3/28/2024 at pm Yes Chadwick Lozano MD Yes    rosuvastatin (CRESTOR) 40 MG tablet Take 1 tablet (40 mg) by mouth At Bedtime 3/28/2024 at pm Yes Vasquez Benoit MD Yes    senna-docusate (SENOKOT-S/PERICOLACE) 8.6-50 MG tablet Take 1 tablet by mouth at bedtime 3/28/2024 at pm Yes Unknown, Entered By History     triamcinolone (KENALOG) 0.1 % external ointment Apply topically 2 times daily as needed for irritation Apply to back  Yes Vasquez Benoit MD     UNKNOWN TO PATIENT Take 1 tablet by mouth daily Claritin-D (loratadine-pseudoephedrine) 3/29/2024 at am Yes Unknown, Entered By History     UNKNOWN TO PATIENT Take 1 tablet by mouth at bedtime She could not remember name of medication but OTC med to help sleep - may be Zyrtec or Xyzal 3/28/2024 at pm Yes Unknown, Entered By History     augmented betamethasone dipropionate (DIPROLENE AF) 0.05 % external cream Apply topically 2 times daily   Jacoby Fischer MD     diphenhydrAMINE (BENADRYL) 25 MG tablet take Benadryl 25-50 mg one hour prior to the procedure    Chadwick Lozano MD     empagliflozin (JARDIANCE) 10 MG TABS tablet Take 1 tablet (10 mg) by mouth daily Unsure at Last fill 1/25/24 x90 days but pt unsure if taking  Chadwick Lozano MD     ferrous sulfate (FEROSUL) 325 (65 Fe) MG tablet Take 1 tablet (325 mg) by mouth every other day  Patient not taking: Reported on 3/29/2024 Not Taking  Chadwick Lozano MD     nitroGLYcerin (NITROSTAT) 0.4 MG sublingual tablet Place 1 tablet (0.4 mg) under the tongue See Admin Instructions for chest pain   Vasquez Benoit MD Yes    polyethylene glycol (MIRALAX) 17 GM/Dose powder Take 17 g by mouth daily Hold for diarrhea or loose stools  Patient taking differently: Take 17 g by mouth daily as needed for constipation Hold for diarrhea or loose stools   Audrey Romeo MD     senna-docusate (SENOKOT-S/PERICOLACE) 8.6-50 MG tablet Take 1 tablet by mouth daily as needed for constipation   Chadwick Lozano MD

## 2024-03-29 NOTE — H&P
[facility]  History and Physical - Hospitalist Service       Date of Admission:  3/29/2024  PRIMARY CARE PROVIDER:    Vasquez Benoit    Assessment & Plan   Shirley Cammy Hendricks is a 63 year old female admitted on 3/29/2024 due to occlusion of right LE bypass graft.      Past medical history significant for PAD/PVD, Tobacco use D/O, CAD (history of MI x3), HTN, HLP, DM2, Neuropathy, COPD, GERD, Anemia, Spongiotic dermatitis, MDD with anxiety, Chronic anticoagulation therapy with history of DVT/PE, OA, Charcot-Breonna-Tooth foot deformity, Marginal zone B-cell lymphoma, History of SBO, History of Takotsubo CM, History of SVT.    Discussed with Dr. Mazariegos and reviewed ED notes and Vascular Surgery consult note.  Patient presented to the ED due to right leg pain that had begun ~ 1 hour prior to presentation.  She reported pain seems to worsen with walking and when she attempted to check a pulse in her leg it was absent.  She also described that the foot is colder than the left.      While in the lobby attempts to find right pedal pulse with doppler were unsuccessful.  Dr. Lozano of Vascular Surgery was contacted by the patient as well as Dr. Mazariegos.      Work-up completed while patient was in the lobby included right arterial LE US revealed the right common femoral artery to mid anterior tibial artery bypass graft occlusion.      After assessing patient labs were collected and included a CMP that revealed a sodium of 132, chloride of 97, CO2 of 21 and glucose of 103 otherwise within normal limits.  CBC with differential revealed an RDW of 15.7 otherwise unremarkable.    Right common femoral artery to mid anterior tibial artery bypass graft acute occlusion  PAD/PVD  *Occurred despite low-dose Xarelto and Plavix therapy.    - Vascular surgery consult requested.     --IR consult and proceed with right LE angiogram and lytic therapy.   --Close monitoring in the ICU.    --Hold off on Heparin infusion  with plan to proceed with angiogram in the next several hours.    - IR consult requested.    --Per Vascular surgery consult Dr. Salomon was contacted and plan for right LE angiogram and lytic therapy.    - Hold PTA Xarelto 15 mg nightly.    - Defer resumption of PTA Plavix 75 mg/d to Vascular Surgery and/or IR.    - Statin and antihypertensive management as below.    - Pain management:               --Schedule APAP 975 mg every 8 hours.                 --PRN oxycodone 2.5-5 mg every 4 hours based of pain severity.               --PRN IV dilaudid 0.2-0.4 mg every 2 hours based off severity.    --Resumed on PTA gabapentin as below.      Contrast dye allergy  - Ordered solumedrol and benadryl per contrast dye allergy protocol.      Mild hyponatremia  - BMP ordered for AM of 3/30.      Tobacco Use D/O   Patient currently smokes max 5 cigarettes per day.     - Counseled regarding smoking cessation that should also continue with PCP.    - Nicoderm patch ordered.    Recent celiac disease  *Patient reported recent GI work-up revealed she has celiac disease.    - Once diet can be advanced would request no gluten/celiac diet.      CAD (history of MI x3)  HTN  HLP  *Last coronary angiogram completed 10/2023.    - Resumed on PTA Coreg 6.25 mg BID, lisinopril 10 mg/d, Norvasc 2.5 mg/d.  Hold parameters in place.    - Resumed on PTA rosuvastatin 40 mg/d.    - PRN IV hydralazine 10 mg every 4 hours for SBP GREATER THAN 180.    - Monitor on telemetry.      History of Takotsubo CM  *Recovered EF (55-60%) from ECHO 11/2023.    - Resumed on PTA Coreg 6.25 mg BID, lisinopril 10 mg/d and Jardiance 10 mg/d.      Type 2 DM  Diabetic neuropathy  *Noted diagnosis on chart review.  However, A1c of 5.2%.    - Resumed on PTA Jardiance 10 mg/d and gabapentin 100 mg TID.      COPD   - Resumed on PTA inhalers.    - Encourage use of Flutter valve/IS.      GERD  - Resumed on PTA omeprazole 20 mg/d.      Anemia  Recent GI bleed (12/2023 and  admitted at [facility])  - Hold PTA Iron supplements and resume at discharge.    - CBC ordered AM of 3/30.      Spongiotic dermatitis  *Recently seen at outpatient Derm.    - Resumed on PTA betamethasone cream BID.      MDD with anxiety  *Noted on chart review.  No interventions.      Chronic anticoagulation therapy with history of DVT/PE  - Hold PTA Xarelto.      OA  - Pain management as above.      Charcot-Breonna-Tooth foot deformity  *Noted on chart review.  No interventions.     Stage VALENTIN marginal zone B-cell lymphoma  *Follows with [facility] (Dr. Barrow).    - Continue outpatient surveillance (CT scan in 8/2024).      History of SBO  *Noted on chart review.  No interventions.    History of SVT  - Monitor on telemetry.    - Resumed on PTA Coreg as above.      Clinically Significant Risk Factors Present on Admission               # Drug Induced Coagulation Defect: home medication list includes an anticoagulant medication  # Drug Induced Platelet Defect: home medication list includes an antiplatelet medication   # Hypertension: Noted on problem list          # Financial/Environmental Concerns:                Diet: NPO except meds  DVT Prophylaxis: Hold PTA Xarelto; anticipate starting heparin infusion following angiogram  Alvarez Catheter: Not present  Lines: None     Cardiac Monitoring: ACTIVE order. Indication: ICU  Code Status: FULL CODE         Disposition Plan   Inpatient status.  Anticipate greater than 2 evening hospitalization while undergoing continued work-up/management of right lower extremity bypass occlusion.      The patient's care was discussed with the Patient, Patient's Family, and Dr. Mazariegos .    The patient has been discussed with Dr. Cristobal, who agrees with the assessment and plan at this time.    Kavin Coulter PA-C  [facility]  Securely message with the Vocera Web Console (learn more here)  Text page via bookjam  Paging/Directory    ______________________________________________________________________    Chief Complaint   Right leg pain, right foot cooler, unable to palpate pulse    History is obtained from Dr. Mazariegos, the patient and EMR.      History of Present Illness   Shirley Hendricks is a 63 year old female admitted on 3/29/2024 due to occlusion of right LE bypass graft.      Past medical history significant for PAD/PVD, Tobacco use D/O, CAD (history of MI x3), HTN, HLP, DM2, Neuropathy, COPD, GERD, Anemia, Spongiotic dermatitis, MDD with anxiety, Chronic anticoagulation therapy with history of DVT/PE, OA, Charcot-Breonna-Tooth foot deformity, Marginal zone B-cell lymphoma, History of SBO, History of Takotsubo CM, History of SVT.    Discussed with Dr. Mazariegos and reviewed ED notes and Vascular Surgery consult note.  Patient presented to the ED due to right leg pain that had begun ~ 1 hour prior to presentation.  She reported pain seems to worsen with walking and when she attempted to check a pulse in her leg it was absent.  She also described that the foot is colder than the left.      While in the lobby attempts to find right pedal pulse with doppler were unsuccessful.  Dr. Lozano of Vascular Surgery was contacted by the patient as well as Dr. Mazariegos.      Work-up completed while patient was in the lobby included right arterial LE US revealed the right common femoral artery to mid anterior tibial artery bypass graft occlusion.      After assessing patient labs were collected and included a CMP that revealed a sodium of 132, chloride of 97, CO2 of 21 and glucose of 103 otherwise within normal limits.  CBC with differential revealed an RDW of 15.7 otherwise unremarkable.    Patient was seen in the lobby of the ED with her sister.  We reviewed events that led to patient's presentation to the ED regarding her right leg pain, inability to palpate a pulse, cooler foot.    Upon questioning, patient indicated that she  has not been feeling very well for the last week or so.  She has an ongoing rash on her back that has been present for about 1 year.  She did recently just seen a dermatologist regarding this.  She frequently finds that her left foot is swollen despite having a particular compression sock/shoe.  Patient is just getting over an upper respiratory infection where she has been experiencing a runny nose, cough and sore throat.  Patient feels she bruises and bleeds quite easily.  She has ongoing numbness and tingling in her feet and fingers.    Patient resides in a house with her , brother, son and her son's friend in [place], [place].  Patient indicated she smokes a maximum of 5 cigarettes/day.  She does not consume alcohol.  She does utilize marijuana every now and then to manage her pain.  She does not utilize a cane or a walker.  She does not utilize a CPAP machine or supplemental oxygen.    Discussed and reviewed CODE STATUS and patient elected to be full code.      Past Medical History    I have reviewed this patient's medical history and updated it with pertinent information if needed.   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  clot    Post-menopausal     Reflux esophagitis 02/2004    EGD: esophagitis and medium HH    SBO (small bowel obstruction) (H) 08/10/2021    SVT (supraventricular tachycardia)     Thrombocytopenia (H24)     Uncomplicated asthma     Vitamin C deficiency 08/12/2018   PAD/PVD, Tobacco use D/O, CAD (history of MI x3), HTN, HLP, DM2, Neuropathy, COPD, GERD, Anemia, Spongiotic dermatitis, MDD with anxiety, Chronic anticoagulation therapy with history of DVT/PE, OA, Charcot-Breonna-Tooth foot deformity, Marginal zone B-cell lymphoma, History of SBO, History of Takotsubo CM, History of SVT.    Prior to Admission Medications   Prior to Admission Medications   Prescriptions Last Dose Informant Patient Reported? Taking?   Calcium Carb-Cholecalciferol (CALCIUM CARBONATE-VITAMIN D3) 600-400 MG-UNIT TABS Past Month at Ran out ~1 week ago Self No Yes   Sig: TAKE ONE TABLET BY MOUTH TWICE DAILY   UNKNOWN TO PATIENT 3/29/2024 at am  Yes Yes   Sig: Take 1 tablet by mouth daily Claritin-D (loratadine-pseudoephedrine)   UNKNOWN TO PATIENT 3/28/2024 at pm  Yes Yes   Sig: Take 1 tablet by mouth at bedtime She could not remember name of medication but OTC med to help sleep - may be Zyrtec or Xyzal   acetaminophen (TYLENOL) 500 MG tablet  Self Yes Yes   Sig: Take 500-1,000 mg by mouth every 6 hours as needed for mild pain   amLODIPine (NORVASC) 2.5 MG tablet Past Month at Unintentionally missed ~1 month  No Yes   Sig: Take 1 tablet (2.5 mg) by mouth daily   Patient taking differently: Take 2.5 mg by mouth at bedtime   augmented betamethasone dipropionate (DIPROLENE AF) 0.05 % external cream   No Yes   Sig: Apply topically 2 times daily as needed (skin rash)   augmented betamethasone dipropionate (DIPROLENE AF) 0.05 % external cream   No No   Sig: Apply topically 2 times daily   carvedilol (COREG) 6.25 MG tablet 3/29/2024 at am  No Yes   Sig: Take 1 tablet (6.25 mg) by mouth 2 times daily (with meals)   clopidogrel (PLAVIX) 75 MG tablet  3/29/2024 at am  No Yes   Sig: Take 1 tablet (75 mg) by mouth daily   diphenhydrAMINE (BENADRYL) 25 MG tablet  Self No No   Sig: take Benadryl 25-50 mg one hour prior to the procedure   empagliflozin (JARDIANCE) 10 MG TABS tablet Unsure at Last fill 1/25/24 x90 days but pt unsure if taking  No No   Sig: Take 1 tablet (10 mg) by mouth daily   ferrous sulfate (FEROSUL) 325 (65 Fe) MG tablet Not Taking  No No   Sig: Take 1 tablet (325 mg) by mouth every other day   Patient not taking: Reported on 3/29/2024   fluticasone-vilanterol (BREO ELLIPTA) 200-25 MCG/ACT inhaler 3/29/2024  No Yes   Sig: Inhale 1 puff into the lungs daily   gabapentin (NEURONTIN) 100 MG capsule 3/29/2024 at am  No Yes   Sig: Take 1 capsule (100 mg) by mouth 3 times daily   lisinopril (ZESTRIL) 10 MG tablet 3/29/2024 at am  No Yes   Sig: Take 1 tablet (10 mg) by mouth daily   nicotine (NICODERM CQ) 14 MG/24HR 24 hr patch  at does not have on  No Yes   Sig: Place 1 patch onto the skin daily   Patient taking differently: Place 1 patch onto the skin daily as needed for nicotine withdrawal symptoms   nitroGLYcerin (NITROSTAT) 0.4 MG sublingual tablet  Self No No   Sig: Place 1 tablet (0.4 mg) under the tongue See Admin Instructions for chest pain   omeprazole (PRILOSEC) 20 MG DR capsule 3/29/2024 at am Self No Yes   Sig: TAKE ONE CAPSULE BY MOUTH DAILY   polyethylene glycol (MIRALAX) 17 GM/Dose powder   No No   Sig: Take 17 g by mouth daily Hold for diarrhea or loose stools   Patient taking differently: Take 17 g by mouth daily as needed for constipation Hold for diarrhea or loose stools   rivaroxaban ANTICOAGULANT (XARELTO) 15 MG TABS tablet 3/28/2024 at pm  No Yes   Sig: Take 1 tablet (15 mg) by mouth daily (with dinner)   rosuvastatin (CRESTOR) 40 MG tablet 3/28/2024 at pm  No Yes   Sig: Take 1 tablet (40 mg) by mouth At Bedtime   senna-docusate (SENOKOT-S/PERICOLACE) 8.6-50 MG tablet   No No   Sig: Take 1 tablet by mouth daily as needed for  constipation   senna-docusate (SENOKOT-S/PERICOLACE) 8.6-50 MG tablet 3/28/2024 at pm  Yes Yes   Sig: Take 1 tablet by mouth at bedtime   triamcinolone (KENALOG) 0.1 % external ointment   No Yes   Sig: Apply topically 2 times daily as needed for irritation Apply to back      Facility-Administered Medications: None     Allergies   Allergies   Allergen Reactions    Pantoprazole      Protonix caused diffuse edema    Chantix [Varenicline]      Terrible dreams    Contrast Dye Swelling     Patient reports facial and throat swelling with prior CT contrast.    Penicillins Itching and Rash       Physical Exam   Vital Signs: Temp: 97.9  F (36.6  C) Temp src: Temporal BP: (!) 191/83 Pulse: 62   Resp: 16 SpO2: 99 % O2 Device: None (Room air)    Weight: 110 lbs 0 oz    Constitutional: Awake, alert, cooperative, no apparent distress.    ENT: Normocephalic, without obvious abnormality, atraumatic.  Wearing a face mask.  Eyes extra occular movements intact.  Normal sclera.    Neck: Supple, symmetrical, trachea midline, no adenopathy.  Pulmonary: No increased work of breathing, fair air exchange, clear to auscultation bilaterally, no crackles or wheezing.  Cardiovascular: Regular rate and rhythm, normal S1 and S2, no S3 or S4, and no murmur noted.  GI: Normal bowel sounds, soft, non-distended, non-tender.    Skin/Integumen: Visualized skin appeared clear.  Neuro: CN II-XII grossly intact.  Upper and lower extremities strength, coordination and sensation intact bilaterally.    Psych:  Alert and oriented x 3. Normal affect.  Extremities: No lower extremity edema noted, and calves are non-tender to palpation bilaterally.      Medical Decision Making       Please see A&P for additional details of medical decision making.  Greater than 75 MINUTES SPENT BY ME on the date of service doing chart review, history, exam, documentation & further activities per the note.         Data   Data reviewed today: I reviewed all medications, new labs  and imaging results over the last 24 hours. I personally reviewed no images or EKG's today.      I have personally reviewed the following data over the past 24 hrs:    6.0  \   11.8   / 195     132 (L) 97 (L) 12.3 /  101 (H)   4.2 21 (L) 0.67 \       Imaging results reviewed over the past 24 hrs:   Recent Results (from the past 24 hour(s))   US Lower Extremity Arterial Duplex Right    Narrative    US LOWER EXTREMITY ARTERIAL DUPLEX RIGHT  3/29/2024 3:32 PM     HISTORY:  Patient is status post right common femoral artery to mid  anterior tibial artery bypass graft. Peripheral arterial disease. Cold  foot.    COMPARISON: Ultrasound dated 1/4/2024    FINDINGS: Color Doppler and spectral waveform analysis performed. The  right common femoral artery to mid anterior tibial artery bypass graft  is occluded.    ALBERTO BENITEZ MD         SYSTEM ID:  [id number]

## 2024-03-29 NOTE — LETTER
Dialysis Intake Checklist      Your Name: Leonor Mcguire RN Contact Phone Number: [phone]     Fax Number and Email:   [phone]  [email or web address] Hospital/Practice: [facility]     Patient Name: Shirley Hendricks   Referring Nephrologist: Dr. Alegria     Requested Facility(s) or Zip Code: [address]     Patient Started Date of Dialysis:    Estimated Outpatient Start Date of Dialysis: 4/15/2024   Treatment Duration & Frequency: ***     Preferred Schedule: {Dialysis Preferred Schedule MWF/TTS:000938} {AM/PM:116183}     Is the patient employed? {Yes/No:118795}   Is there a working shift we can accommodate?  {Yes/No:289788}   Is patient flexible? {Yes/No:824219} {Dialysis Shift/Facility:462937}       Modality:   {Dialysis Modality:386628}   Diagnosis:   {Dialysis Diagnosis:618969}     Access Type(s):   {Dialysis Access Type:779549}    If only a CVC, is there an active AV Access Plan (e.g., Vessel Mapping, Surgeon Consult, etc.)?:   ***         Where will this patient be discharged to? {Dialysis Discharge:086083}     Is this patient trach or vent dependent? {NO/YES:458602}     Does this patient have an L-VAD or Life Vest? {NO/YES:775476}     Does this patient have outpatient dialysis history? {NO/YES:688906}     Does this patient receive continuous medication via infusion pumps? {NO/YES:567933}     Daily Care - Activity Management/Activity assistance needed?   Activity Management: activity adjusted per tolerance   Activity Assistance Provided: assistance, 2 people   Assistive Device Utilized: lift device      Can this patient sit in a standard chair to dialyze? {NO/YES:141914}     Require treatment in a bed?  {NO/YES:061005}     Is the patient HEP B positive? {NO/YES:046885}     Can this patient sign their own legal consents? {NO/YES:185053}     Other special needs? ***       Allergies:   Allergies   Allergen Reactions    Pantoprazole      Protonix caused diffuse edema     Chantix [Varenicline]      Terrible dreams    Contrast Dye Swelling     Patient reports facial and throat swelling with prior CT contrast.    Penicillins Itching and Rash        Medication List:   Current Facility-Administered Medications   Medication Dose Route Frequency Provider Last Rate Last Admin    - MEDICATION INSTRUCTIONS for Dialysis Patients -   Does not apply See Admin Instructions Marcin White MD        acetaminophen (TYLENOL) tablet 650 mg  650 mg Oral Q4H PRN Marcin White MD   650 mg at 04/10/24 1951    albuterol (PROVENTIL) neb solution 2.5 mg  2.5 mg Nebulization Q6H Marcin White MD   2.5 mg at 04/11/24 0759    amLODIPine (NORVASC) tablet 5 mg  5 mg Oral Daily Marcin White MD   5 mg at 04/11/24 0857    betamethasone dipropionate (DIPROSONE) 0.05 % cream   Topical BID Marcin White MD   Given at 04/11/24 0901    carvedilol (COREG) tablet 6.25 mg  6.25 mg Oral BID w/meals Marcin White MD   6.25 mg at 04/11/24 0857    Contraindications to both pharmacological and mechanical prophylaxis (must document contraindications for both in this order)   Does not apply See Admin Instructions Marcin White MD        dextrose 10% infusion   Intravenous Continuous PRN Marcin White MD        glucose gel 15-30 g  15-30 g Oral Q15 Min PRN Marcin White MD        Or    dextrose 50 % injection 25-50 mL  25-50 mL Intravenous Q15 Min PRN Marcin White MD   25 mL at 04/03/24 1648    Or    glucagon injection 1 mg  1 mg Subcutaneous Q15 Min PRN Marcin White MD        famotidine (PEPCID) injection 20 mg  20 mg Intravenous Q48H Marcin White MD   20 mg at 04/10/24 0558    fluticasone-vilanterol (BREO ELLIPTA) 200-25 MCG/ACT inhaler 1 puff  1 puff Inhalation Daily Marcin White MD   1 puff at 04/11/24 0858    hydrALAZINE (APRESOLINE) tablet 25 mg  25 mg Oral 4x Daily PRN Shayy Law MD        HYDROmorphone (DILAUDID) injection 0.2 mg  0.2 mg Intravenous Q2H PRN Teresita, Gala, MD        Or    HYDROmorphone (DILAUDID)  injection 0.4 mg  0.4 mg Intravenous Q2H PRN Gala Lo MD   0.4 mg at 04/10/24 1419    HYDROmorphone (DILAUDID) tablet 2 mg  2 mg Oral Q4H PRN Marcin White MD   2 mg at 04/11/24 0204    HYDROmorphone (DILAUDID) tablet 4 mg  4 mg Oral Q4H PRN Marcin White MD   4 mg at 04/11/24 0955    insulin aspart (NovoLOG) injection (RAPID ACTING)  1-7 Units Subcutaneous TID AC Marcin White MD        insulin aspart (NovoLOG) injection (RAPID ACTING)  1-5 Units Subcutaneous At Bedtime Marcin White MD        Rigo PACK 1 packet  1 packet Oral BID 09 12 Gala Morales DO   1 packet at 04/11/24 1243    lidocaine (LMX4) cream   Topical Q1H PRN Kaylee Liu, NP        lidocaine (LMX4) cream   Topical Q1H PRN Marcin White MD        lidocaine 1 % 0.1-1 mL  0.1-1 mL Other Q1H PRN Kaylee Liu, NP        lidocaine 1 % 0.1-1 mL  0.1-1 mL Other Q1H PRN Maricn White MD        metoclopramide (REGLAN) tablet 5 mg  5 mg Oral Q6H PRN Marcin White MD        Or    metoclopramide (REGLAN) injection 5 mg  5 mg Intravenous Q6H PRN Marcin White MD        multivitamin RENAL (TRIPHROCAPS) capsule 1 capsule  1 capsule Oral Daily Gala Morales DO   1 capsule at 04/11/24 0857    naloxone (NARCAN) injection 0.2 mg  0.2 mg Intravenous Q2 Min PRN Marcin White MD        Or    naloxone (NARCAN) injection 0.4 mg  0.4 mg Intravenous Q2 Min PRN Marcin White MD        Or    naloxone (NARCAN) injection 0.2 mg  0.2 mg Intramuscular Q2 Min PRN Marcin White MD        Or    naloxone (NARCAN) injection 0.4 mg  0.4 mg Intramuscular Q2 Min PRN Marcin White MD        nicotine (NICODERM CQ) 14 MG/24HR 24 hr patch 1 patch  1 patch Transdermal Daily Marcin White MD   1 patch at 04/11/24 0900    OLANZapine (zyPREXA) injection 5 mg  5 mg Intramuscular Daily PRN Marcin White MD        ondansetron (ZOFRAN ODT) ODT tab 4 mg  4 mg Oral Q6H PRN Marcin White MD   4 mg at 04/08/24 2338    Or    ondansetron (ZOFRAN) injection 4 mg  4 mg Intravenous Q6H PRN Cindy,  "MD Marcin   4 mg at 04/07/24 0844    polyethylene glycol (MIRALAX) Packet 17 g  17 g Oral Daily PRN Marcin White MD        Reason statin not prescribed   Does not apply DOES NOT GO TO Marcin Gomes MD        sodium chloride (PF) 0.9% PF flush 10-20 mL  10-20 mL Intracatheter q1 min prn Sofia Cristobal MD   20 mL at 04/10/24 0558    sodium chloride (PF) 0.9% PF flush 10-40 mL  10-40 mL Intracatheter Q1H PRN Sofia Cristobal MD   20 mL at 04/09/24 1831    sodium chloride (PF) 0.9% PF flush 10-40 mL  10-40 mL Intracatheter Q8H Sofia Cristobal MD   10 mL at 04/11/24 0643    sodium chloride (PF) 0.9% PF flush 3 mL  3 mL Intracatheter Q8H Kaylee Liu L, NP   3 mL at 04/11/24 0859    sodium chloride (PF) 0.9% PF flush 3 mL  3 mL Intracatheter q1 min prn Kaylee Liu, NP        sodium chloride 0.9 % infusion   Intravenous Continuous Marcin White MD 15 mL/hr at 04/10/24 1954 Rate Verify at 04/10/24 1954    sodium chloride 0.9% BOLUS 1-250 mL  1-250 mL Intravenous Q1H PRN Marcin White MD        sodium chloride 0.9% BOLUS 1-250 mL  1-250 mL Intravenous Q1H PRN Marcin White MD        sodium chloride 0.9% BOLUS 1-250 mL  1-250 mL Intravenous Q1H PRN Marcin White MD        sodium chloride tablet 2 g  2 g Oral TID w/meals Gala Morales,    2 g at 04/11/24 1244          HEP Panel:  Hep B Antigen (HBsAG):   Lab Results   Component Value Date    HEPBANG Nonreactive 04/03/2024     Hep B Surface Antibody (HBsAb):   Lab Results   Component Value Date    AUSAB <3.50 04/03/2024     Hep B Total Core Antibody: No results found for: \"HBCAB\"     Chest X-Ray:   Results for orders placed during the hospital encounter of 03/29/24    XR ABDOMEN PORT 1 VIEW    Narrative  EXAM: XR ABDOMEN PORT 1 VIEW  LOCATION: [facility]  DATE: 4/5/2024    INDICATION: Eval for obstruction  COMPARISON: CT or 03/20/2024    Impression  IMPRESSION: Nonobstructive bowel gas pattern. Gaseous distention of the " "colon. Enteric feeding tube tip in the fourth portion of the duodenum. Iliac stents, cholecystectomy clips, and endoscopic clips in the right hemiabdomen.       PPD:  M TUBERCULOSIS RESULT: No results found for: \"TBRSLT\"  M TUBERCULOSIS ANTIGEN VALUE: No results found for: \"TBAGN\"             "

## 2024-03-29 NOTE — CONSULTS
VASCULAR SURGERY    Shirley Hendricks is very well-known to me for her multiple vascular procedures.  She has a right femoral to anterior tibial PTFE bypass graft is required several revisions.  She noted several hours ago that she could no longer palpate the pulse within the bypass graft which she checks on a daily basis and her foot is cooler.  Discomfort when she ambulates.  Motor and sensory is otherwise intact.  No issues with her left leg bypass grafts.    She called our office and immediately came to the emergency department.  She has been seen by Dr. Rangel and myself in the waiting room.  Duplex confirms occlusion of her right bypass graft.    --Failed right limb aortic graft to above-knee popliteal in situ.  Right graft to above-knee popliteal cadaveric SFA bypass.     -- 2/15/2022: Distal popliteal artery occlusion with jump graft to distal popliteal/tibial arteries    --5/6/2023: Right distal femoral to popliteal graft revision with cadaveric SFA to mid anterior tibial artery    --10/7/2023: Emergent right femoral and anterior tibial thrombectomy.  Right femoral to anterior tibial Propaten 6 mm PTFE bypass (distal graft anastomosed to remaining several centimeters of cadaveric SFA graft ). graft anterior tibial enterectomy and vein patch angioplasty.  Dorsalis pedis thrombectomy    -- 10/11/2023: Emergency right femoral to anterior tibial PTFE bypass graft thrombectomy with anterior tibial thrombectomy    --Widely patent right leg bypass graft on 1/4/2024 duplex @office visit      PMH: Allergies: Contrast--given IV Benadryl and oral prednisone medications: Xarelto 15 mg daily, Plavix 75 mg daily, lisinopril, Coreg, Norvasc, Crestor, Prilosec, Breo (inhaler, Neurontin, Prolia     Medical: Hospitalized for GI bleed while on Xarelto and Plavix with normal EGD and colonoscopy.  Discharged 12/15/2023 with no recurrence    Hypertension    Hyperlipidemia on statin    No history of diabetes    Mild  COPD  History of B-cell lymphoma    Charcot- Breonna -Tooth foot deformity    Chronic smoker--cigars approximately 1/2 pack daily    VASCULAR HISTORY: Aortobifemoral bypass graft-left femoral to popliteal in situ bypass     -- 5/11/2016: Right CEA with distal facial vein patch for symptomatic stenosis.  Recent duplex with moderate narrowing of the distal common carotid artery which has been very stable.     -- 6/8/2020: Left CEA with distal facial vein patch for symptomatic stenosis.  Widely patent on 1/4/2024 duplex     -- 12/28/2022: Open primary repair of left femoral pseudoaneurysm    Exam: Patient was seen in the ED waiting room with her sister present.   She has a right cold foot but motor and sensory intact.   No palpable right graft pulse.   +3 left in situ graft pulse.      IMPRESSION: Acute occlusion of right femoral to anterior tibial composite PTFE/cadaveric SFA graft.  This occurred despite low-dose Xarelto and Plavix.  Rest of her bypass grafts are patent.  Discussed with Dr. Leonor Salomon of interventional radiology with plan for right leg angiogram and lytic therapy.    Obvious concern is that we will not be able to open the graft and surgical thrombectomy/embolectomy may be necessary which may be much more difficult with her multiple vascular interventions.    Arranging ICU room which will be necessary for the lytic therapy.  Holding off on IV heparin since plan angiogram within the next several hours.    Over 45 minutes with patient today.    Chadwick Lozano MD

## 2024-03-29 NOTE — LETTER
Dialysis Intake Checklist      Your Name: Leonor Knowles RN Contact Phone Number: [phone]     Fax Number and Email:   [phone]  [email or web address] Hospital/Practice: [facility]     Patient Name: Shirley Hendricks   Referring Nephrologist: Dr. Alegria     Requested Facility(s) or Zip Code: [address]     Patient Started Date of Dialysis: 4/3/2024     Estimated Outpatient Start Date of Dialysis: 4/15/2024   Treatment Duration & Frequency: 3 times a week 3-4 hours     Preferred Schedule: Monday, Wednesday , and Friday AM     Is the patient employed? No   Is there a working shift we can accommodate?  Not known   Is patient flexible? Yes Shift:         Modality:   In-Center Hemo   Diagnosis:   Acute Renal Failure (Patient expected to regain function)-following for recovery     Access Type(s):   CVC    If only a CVC, is there an active AV Access Plan (e.g., Vessel Mapping, Surgeon Consult, etc.)?:   no       Where will this patient be discharged to? Other: acute rehab or TCU     Is this patient trach or vent dependent? No     Does this patient have an L-VAD or Life Vest? No     Does this patient have outpatient dialysis history? No     Does this patient receive continuous medication via infusion pumps? No     Daily Care - Activity Management/Activity assistance needed?   Activity Management: activity adjusted per tolerance   Activity Assistance Provided: assistance, 2 people   Assistive Device Utilized: lift device      Can this patient sit in a standard chair to dialyze? Yes: possibly will need lift as new Above knee amputation     Require treatment in a bed?  No     Is the patient HEP B positive? No     Can this patient sign their own legal consents? Yes:      Other special needs?        Allergies:   Allergies   Allergen Reactions    Pantoprazole      Protonix caused diffuse edema    Chantix [Varenicline]      Terrible dreams    Contrast Dye Swelling     Patient reports  facial and throat swelling with prior CT contrast.    Penicillins Itching and Rash        Medication List:   Current Facility-Administered Medications   Medication Dose Route Frequency Provider Last Rate Last Admin    - MEDICATION INSTRUCTIONS for Dialysis Patients -   Does not apply See Admin Instructions Marcin White MD        acetaminophen (TYLENOL) tablet 650 mg  650 mg Oral Q4H PRN Marcin White MD   650 mg at 04/10/24 1951    albuterol (PROVENTIL) neb solution 2.5 mg  2.5 mg Nebulization Q6H Marcin White MD   2.5 mg at 04/11/24 0759    amLODIPine (NORVASC) tablet 5 mg  5 mg Oral Daily Marcin White MD   5 mg at 04/11/24 0857    betamethasone dipropionate (DIPROSONE) 0.05 % cream   Topical BID Marcin White MD   Given at 04/11/24 0901    carvedilol (COREG) tablet 6.25 mg  6.25 mg Oral BID w/meals Marcin White MD   6.25 mg at 04/11/24 0857    Contraindications to both pharmacological and mechanical prophylaxis (must document contraindications for both in this order)   Does not apply See Admin Instructions Marcin White MD        dextrose 10% infusion   Intravenous Continuous PRN Marcin White MD        glucose gel 15-30 g  15-30 g Oral Q15 Min PRN Marcin White MD        Or    dextrose 50 % injection 25-50 mL  25-50 mL Intravenous Q15 Min PRN Marcin White MD   25 mL at 04/03/24 1648    Or    glucagon injection 1 mg  1 mg Subcutaneous Q15 Min PRN Marcin White MD        famotidine (PEPCID) injection 20 mg  20 mg Intravenous Q48H Marcin White MD   20 mg at 04/10/24 0558    fluticasone-vilanterol (BREO ELLIPTA) 200-25 MCG/ACT inhaler 1 puff  1 puff Inhalation Daily Marcin White MD   1 puff at 04/11/24 0858    hydrALAZINE (APRESOLINE) tablet 25 mg  25 mg Oral 4x Daily PRN Shayy Law MD        HYDROmorphone (DILAUDID) injection 0.2 mg  0.2 mg Intravenous Q2H PRN Gala Lo MD        Or    HYDROmorphone (DILAUDID) injection 0.4 mg  0.4 mg Intravenous Q2H PRN Gala Lo MD   0.4 mg at 04/10/24 1417     HYDROmorphone (DILAUDID) tablet 2 mg  2 mg Oral Q4H PRN Marcin White MD   2 mg at 04/11/24 0204    HYDROmorphone (DILAUDID) tablet 4 mg  4 mg Oral Q4H PRN Marcin White MD   4 mg at 04/11/24 0955    insulin aspart (NovoLOG) injection (RAPID ACTING)  1-7 Units Subcutaneous TID AC Marcin White MD        insulin aspart (NovoLOG) injection (RAPID ACTING)  1-5 Units Subcutaneous At Bedtime Marcin White MD        Rigo PACK 1 packet  1 packet Oral BID 09 12 Gala Morales, DO   1 packet at 04/11/24 1243    lidocaine (LMX4) cream   Topical Q1H PRN Kaylee Liu NP        lidocaine (LMX4) cream   Topical Q1H PRN Marcin White MD        lidocaine 1 % 0.1-1 mL  0.1-1 mL Other Q1H PRN Kaylee Liu, ANNE        lidocaine 1 % 0.1-1 mL  0.1-1 mL Other Q1H PRN Marcin White MD        metoclopramide (REGLAN) tablet 5 mg  5 mg Oral Q6H PRN Marcin White MD        Or    metoclopramide (REGLAN) injection 5 mg  5 mg Intravenous Q6H PRN Marcin White MD        multivitamin RENAL (TRIPHROCAPS) capsule 1 capsule  1 capsule Oral Daily Gala Morales, DO   1 capsule at 04/11/24 0857    naloxone (NARCAN) injection 0.2 mg  0.2 mg Intravenous Q2 Min PRN Marcin White MD        Or    naloxone (NARCAN) injection 0.4 mg  0.4 mg Intravenous Q2 Min PRN Marcin White MD        Or    naloxone (NARCAN) injection 0.2 mg  0.2 mg Intramuscular Q2 Min PRN Marcin White MD        Or    naloxone (NARCAN) injection 0.4 mg  0.4 mg Intramuscular Q2 Min PRN Marcin White MD        nicotine (NICODERM CQ) 14 MG/24HR 24 hr patch 1 patch  1 patch Transdermal Daily Marcin White MD   1 patch at 04/11/24 0900    OLANZapine (zyPREXA) injection 5 mg  5 mg Intramuscular Daily PRN Marcin White MD        ondansetron (ZOFRAN ODT) ODT tab 4 mg  4 mg Oral Q6H PRN Marcin White MD   4 mg at 04/08/24 2338    Or    ondansetron (ZOFRAN) injection 4 mg  4 mg Intravenous Q6H PRN Marcin White MD   4 mg at 04/07/24 0844    polyethylene glycol (MIRALAX) Packet 17 g  17 g Oral Daily  "Marcin Hughes MD        Reason statin not prescribed   Does not apply DOES NOT GO TO Marcin Gomes MD        sodium chloride (PF) 0.9% PF flush 10-20 mL  10-20 mL Intracatheter q1 min prn Sofia Cristobal MD   20 mL at 04/10/24 0558    sodium chloride (PF) 0.9% PF flush 10-40 mL  10-40 mL Intracatheter Q1H PRN Sofia Cristobal MD   20 mL at 04/09/24 1831    sodium chloride (PF) 0.9% PF flush 10-40 mL  10-40 mL Intracatheter Q8H Sofia Cristobal MD   10 mL at 04/11/24 0643    sodium chloride (PF) 0.9% PF flush 3 mL  3 mL Intracatheter Q8H Eduarda Liua L, NP   3 mL at 04/11/24 0859    sodium chloride (PF) 0.9% PF flush 3 mL  3 mL Intracatheter q1 min prn Kaylee Liu L, NP        sodium chloride 0.9 % infusion   Intravenous Continuous Marcin White MD 15 mL/hr at 04/10/24 1954 Rate Verify at 04/10/24 1954    sodium chloride 0.9% BOLUS 1-250 mL  1-250 mL Intravenous Q1H PRN Marcin White MD        sodium chloride 0.9% BOLUS 1-250 mL  1-250 mL Intravenous Q1H PRN Marcin White MD        sodium chloride 0.9% BOLUS 1-250 mL  1-250 mL Intravenous Q1H PRN Marcin White MD        sodium chloride tablet 2 g  2 g Oral TID w/meals Gala Morales,    2 g at 04/11/24 1244          HEP Panel:  Hep B Antigen (HBsAG):   Lab Results   Component Value Date    HEPBANG Nonreactive 04/03/2024     Hep B Surface Antibody (HBsAb):   Lab Results   Component Value Date    AUSAB <3.50 04/03/2024     Hep B Total Core Antibody: No results found for: \"HBCAB\"     Chest X-Ray:   Results for orders placed during the hospital encounter of 03/29/24    XR ABDOMEN PORT 1 VIEW    Narrative  EXAM: XR ABDOMEN PORT 1 VIEW  LOCATION: [facility]  DATE: 4/5/2024    INDICATION: Eval for obstruction  COMPARISON: CT or 03/20/2024    Impression  IMPRESSION: Nonobstructive bowel gas pattern. Gaseous distention of the colon. Enteric feeding tube tip in the fourth portion of the duodenum. Iliac stents, cholecystectomy " "clips, and endoscopic clips in the right hemiabdomen.       PPD:  M TUBERCULOSIS RESULT: No results found for: \"TBRSLT\"  M TUBERCULOSIS ANTIGEN VALUE: No results found for: \"TBAGN\"             "

## 2024-03-29 NOTE — ED TRIAGE NOTES
Pt states right leg pain starting an hour ago. Worse when she walks. States could not feel pulse in leg. Right foot colder than left.  Multiple venous bypass surgeries in legs.      Triage Assessment (Adult)       Row Name 03/29/24 1434          Triage Assessment    Airway WDL WDL        Respiratory WDL    Respiratory WDL WDL        Skin Circulation/Temperature WDL    Skin Circulation/Temperature WDL WDL        Cardiac WDL    Cardiac WDL WDL        Peripheral/Neurovascular WDL    Peripheral Neurovascular WDL WDL        Cognitive/Neuro/Behavioral WDL    Cognitive/Neuro/Behavioral WDL WDL

## 2024-03-30 ENCOUNTER — APPOINTMENT (OUTPATIENT)
Dept: CT IMAGING | Facility: CLINIC | Age: 64
DRG: 270 | End: 2024-03-30
Payer: COMMERCIAL

## 2024-03-30 ENCOUNTER — APPOINTMENT (OUTPATIENT)
Dept: INTERVENTIONAL RADIOLOGY/VASCULAR | Facility: CLINIC | Age: 64
DRG: 270 | End: 2024-03-30
Attending: RADIOLOGY
Payer: COMMERCIAL

## 2024-03-30 ENCOUNTER — APPOINTMENT (OUTPATIENT)
Dept: CT IMAGING | Facility: CLINIC | Age: 64
DRG: 270 | End: 2024-03-30
Attending: STUDENT IN AN ORGANIZED HEALTH CARE EDUCATION/TRAINING PROGRAM
Payer: COMMERCIAL

## 2024-03-30 ENCOUNTER — APPOINTMENT (OUTPATIENT)
Dept: ULTRASOUND IMAGING | Facility: CLINIC | Age: 64
DRG: 270 | End: 2024-03-30
Payer: COMMERCIAL

## 2024-03-30 LAB
ABO/RH(D): NORMAL
ACANTHOCYTES BLD QL SMEAR: NORMAL
ANTIBODY SCREEN: NEGATIVE
AUER BODIES BLD QL SMEAR: NORMAL
BASO STIPL BLD QL SMEAR: NORMAL
BASOPHILS # BLD AUTO: 0 10E3/UL (ref 0–0.2)
BASOPHILS NFR BLD AUTO: 0 %
BITE CELLS BLD QL SMEAR: NORMAL
BLD PROD TYP BPU: NORMAL
BLISTER CELLS BLD QL SMEAR: NORMAL
BLOOD COMPONENT TYPE: NORMAL
BURR CELLS BLD QL SMEAR: NORMAL
CODING SYSTEM: NORMAL
CREAT SERPL-MCNC: 0.45 MG/DL (ref 0.51–0.95)
CROSSMATCH: NORMAL
DACRYOCYTES BLD QL SMEAR: NORMAL
EGFRCR SERPLBLD CKD-EPI 2021: >90 ML/MIN/1.73M2
ELLIPTOCYTES BLD QL SMEAR: NORMAL
EOSINOPHIL # BLD AUTO: 0 10E3/UL (ref 0–0.7)
EOSINOPHIL NFR BLD AUTO: 0 %
ERYTHROCYTE [DISTWIDTH] IN BLOOD BY AUTOMATED COUNT: 15.9 % (ref 10–15)
ERYTHROCYTE [DISTWIDTH] IN BLOOD BY AUTOMATED COUNT: 16 % (ref 10–15)
FIBRINOGEN PPP-MCNC: 118 MG/DL (ref 170–490)
FIBRINOGEN PPP-MCNC: 244 MG/DL (ref 170–490)
FIBRINOGEN PPP-MCNC: 75 MG/DL (ref 170–490)
FIBRINOGEN PPP-MCNC: <61 MG/DL (ref 170–490)
FRAGMENTS BLD QL SMEAR: NORMAL
GLUCOSE BLDC GLUCOMTR-MCNC: 180 MG/DL (ref 70–99)
GLUCOSE BLDC GLUCOMTR-MCNC: 205 MG/DL (ref 70–99)
GLUCOSE BLDC GLUCOMTR-MCNC: 57 MG/DL (ref 70–99)
GLUCOSE BLDC GLUCOMTR-MCNC: 73 MG/DL (ref 70–99)
HCT VFR BLD AUTO: 25 % (ref 35–47)
HCT VFR BLD AUTO: 25.5 % (ref 35–47)
HGB BLD-MCNC: 8.1 G/DL (ref 11.7–15.7)
HGB BLD-MCNC: 8.2 G/DL (ref 11.7–15.7)
HGB C CRYSTALS: NORMAL
HOWELL-JOLLY BOD BLD QL SMEAR: NORMAL
IMM GRANULOCYTES # BLD: 0 10E3/UL
IMM GRANULOCYTES NFR BLD: 0 %
ISSUE DATE AND TIME: NORMAL
LYMPHOCYTES # BLD AUTO: 0.4 10E3/UL (ref 0.8–5.3)
LYMPHOCYTES NFR BLD AUTO: 8 %
MCH RBC QN AUTO: 28.3 PG (ref 26.5–33)
MCH RBC QN AUTO: 28.4 PG (ref 26.5–33)
MCHC RBC AUTO-ENTMCNC: 32.2 G/DL (ref 31.5–36.5)
MCHC RBC AUTO-ENTMCNC: 32.4 G/DL (ref 31.5–36.5)
MCV RBC AUTO: 87 FL (ref 78–100)
MCV RBC AUTO: 88 FL (ref 78–100)
MONOCYTES # BLD AUTO: 0.1 10E3/UL (ref 0–1.3)
MONOCYTES NFR BLD AUTO: 3 %
NEUTROPHILS # BLD AUTO: 4.7 10E3/UL (ref 1.6–8.3)
NEUTROPHILS NFR BLD AUTO: 89 %
NEUTS HYPERSEG BLD QL SMEAR: NORMAL
NRBC # BLD AUTO: 0 10E3/UL
NRBC BLD AUTO-RTO: 0 /100
PLAT MORPH BLD: NORMAL
PLATELET # BLD AUTO: 133 10E3/UL (ref 150–450)
PLATELET # BLD AUTO: 171 10E3/UL (ref 150–450)
POLYCHROMASIA BLD QL SMEAR: NORMAL
RBC # BLD AUTO: 2.86 10E6/UL (ref 3.8–5.2)
RBC # BLD AUTO: 2.89 10E6/UL (ref 3.8–5.2)
RBC AGGLUT BLD QL: NORMAL
RBC MORPH BLD: NORMAL
ROULEAUX BLD QL SMEAR: NORMAL
SICKLE CELLS BLD QL SMEAR: NORMAL
SMUDGE CELLS BLD QL SMEAR: NORMAL
SPECIMEN EXPIRATION DATE: NORMAL
SPHEROCYTES BLD QL SMEAR: NORMAL
STOMATOCYTES BLD QL SMEAR: NORMAL
TARGETS BLD QL SMEAR: NORMAL
TOXIC GRANULES BLD QL SMEAR: NORMAL
UFH PPP CHRO-ACNC: <0.1 IU/ML
UNIT ABO/RH: NORMAL
UNIT NUMBER: NORMAL
UNIT STATUS: NORMAL
UNIT TYPE ISBT: 6200
VARIANT LYMPHS BLD QL SMEAR: NORMAL
WBC # BLD AUTO: 5.2 10E3/UL (ref 4–11)
WBC # BLD AUTO: 6.5 10E3/UL (ref 4–11)

## 2024-03-30 PROCEDURE — 86900 BLOOD TYPING SEROLOGIC ABO: CPT | Performed by: STUDENT IN AN ORGANIZED HEALTH CARE EDUCATION/TRAINING PROGRAM

## 2024-03-30 PROCEDURE — 250N000013 HC RX MED GY IP 250 OP 250 PS 637

## 2024-03-30 PROCEDURE — 93971 EXTREMITY STUDY: CPT

## 2024-03-30 PROCEDURE — C1757 CATH, THROMBECTOMY/EMBOLECT: HCPCS

## 2024-03-30 PROCEDURE — 250N000013 HC RX MED GY IP 250 OP 250 PS 637: Performed by: RADIOLOGY

## 2024-03-30 PROCEDURE — 047K3ZZ DILATION OF RIGHT FEMORAL ARTERY, PERCUTANEOUS APPROACH: ICD-10-PCS | Performed by: RADIOLOGY

## 2024-03-30 PROCEDURE — 255N000002 HC RX 255 OP 636: Performed by: RADIOLOGY

## 2024-03-30 PROCEDURE — 258N000003 HC RX IP 258 OP 636: Performed by: RADIOLOGY

## 2024-03-30 PROCEDURE — 272N000124 HC CATH CR11

## 2024-03-30 PROCEDURE — 85027 COMPLETE CBC AUTOMATED: CPT | Performed by: RADIOLOGY

## 2024-03-30 PROCEDURE — 99233 SBSQ HOSP IP/OBS HIGH 50: CPT | Performed by: STUDENT IN AN ORGANIZED HEALTH CARE EDUCATION/TRAINING PROGRAM

## 2024-03-30 PROCEDURE — P9016 RBC LEUKOCYTES REDUCED: HCPCS | Performed by: STUDENT IN AN ORGANIZED HEALTH CARE EDUCATION/TRAINING PROGRAM

## 2024-03-30 PROCEDURE — 250N000011 HC RX IP 250 OP 636: Performed by: RADIOLOGY

## 2024-03-30 PROCEDURE — C1725 CATH, TRANSLUMIN NON-LASER: HCPCS

## 2024-03-30 PROCEDURE — 86923 COMPATIBILITY TEST ELECTRIC: CPT | Performed by: STUDENT IN AN ORGANIZED HEALTH CARE EDUCATION/TRAINING PROGRAM

## 2024-03-30 PROCEDURE — 272N000126 HC CATH CR14

## 2024-03-30 PROCEDURE — 272N000127 HC CATH CR16

## 2024-03-30 PROCEDURE — 04LK3DZ OCCLUSION OF RIGHT FEMORAL ARTERY WITH INTRALUMINAL DEVICE, PERCUTANEOUS APPROACH: ICD-10-PCS | Performed by: RADIOLOGY

## 2024-03-30 PROCEDURE — C1769 GUIDE WIRE: HCPCS

## 2024-03-30 PROCEDURE — 72131 CT LUMBAR SPINE W/O DYE: CPT

## 2024-03-30 PROCEDURE — 272N000192 HC ACCESSORY CR2

## 2024-03-30 PROCEDURE — 200N000001 HC R&B ICU

## 2024-03-30 PROCEDURE — 85384 FIBRINOGEN ACTIVITY: CPT

## 2024-03-30 PROCEDURE — 272N000188 HC ACCESSORY CR12

## 2024-03-30 PROCEDURE — 99232 SBSQ HOSP IP/OBS MODERATE 35: CPT | Performed by: SURGERY

## 2024-03-30 PROCEDURE — 258N000001 HC RX 258: Performed by: PHYSICIAN ASSISTANT

## 2024-03-30 PROCEDURE — 36415 COLL VENOUS BLD VENIPUNCTURE: CPT

## 2024-03-30 PROCEDURE — 272N000302 HC DEVICE INFLATION CR5

## 2024-03-30 PROCEDURE — 37244 VASC EMBOLIZE/OCCLUDE BLEED: CPT

## 2024-03-30 PROCEDURE — 37184 PRIM ART M-THRMBC 1ST VSL: CPT

## 2024-03-30 PROCEDURE — 70450 CT HEAD/BRAIN W/O DYE: CPT

## 2024-03-30 PROCEDURE — 250N000011 HC RX IP 250 OP 636: Performed by: STUDENT IN AN ORGANIZED HEALTH CARE EDUCATION/TRAINING PROGRAM

## 2024-03-30 PROCEDURE — 272N000194 HC ACCESSORY CR3

## 2024-03-30 PROCEDURE — 250N000011 HC RX IP 250 OP 636: Performed by: PHYSICIAN ASSISTANT

## 2024-03-30 PROCEDURE — 047K34Z DILATION OF RIGHT FEMORAL ARTERY WITH DRUG-ELUTING INTRALUMINAL DEVICE, PERCUTANEOUS APPROACH: ICD-10-PCS | Performed by: RADIOLOGY

## 2024-03-30 PROCEDURE — 250N000009 HC RX 250: Performed by: RADIOLOGY

## 2024-03-30 PROCEDURE — C1874 STENT, COATED/COV W/DEL SYS: HCPCS

## 2024-03-30 PROCEDURE — 85025 COMPLETE CBC W/AUTO DIFF WBC: CPT | Performed by: RADIOLOGY

## 2024-03-30 PROCEDURE — 250N000011 HC RX IP 250 OP 636: Mod: JZ | Performed by: RADIOLOGY

## 2024-03-30 PROCEDURE — 86923 COMPATIBILITY TEST ELECTRIC: CPT

## 2024-03-30 PROCEDURE — C1889 IMPLANT/INSERT DEVICE, NOC: HCPCS

## 2024-03-30 PROCEDURE — 85520 HEPARIN ASSAY: CPT | Performed by: RADIOLOGY

## 2024-03-30 PROCEDURE — 272N000570 HC SHEATH CR7

## 2024-03-30 PROCEDURE — 250N000013 HC RX MED GY IP 250 OP 250 PS 637: Performed by: PHYSICIAN ASSISTANT

## 2024-03-30 PROCEDURE — 82565 ASSAY OF CREATININE: CPT | Performed by: RADIOLOGY

## 2024-03-30 PROCEDURE — 86923 COMPATIBILITY TEST ELECTRIC: CPT | Performed by: INTERNAL MEDICINE

## 2024-03-30 PROCEDURE — 258N000003 HC RX IP 258 OP 636: Performed by: STUDENT IN AN ORGANIZED HEALTH CARE EDUCATION/TRAINING PROGRAM

## 2024-03-30 RX ORDER — HEPARIN SODIUM 1000 [USP'U]/ML
4000 INJECTION, SOLUTION INTRAVENOUS; SUBCUTANEOUS ONCE
Status: COMPLETED | OUTPATIENT
Start: 2024-03-30 | End: 2024-03-30

## 2024-03-30 RX ORDER — FLUMAZENIL 0.1 MG/ML
0.2 INJECTION, SOLUTION INTRAVENOUS
Status: DISCONTINUED | OUTPATIENT
Start: 2024-03-30 | End: 2024-03-30 | Stop reason: HOSPADM

## 2024-03-30 RX ORDER — LIDOCAINE 40 MG/G
CREAM TOPICAL
Status: DISCONTINUED | OUTPATIENT
Start: 2024-03-30 | End: 2024-03-30 | Stop reason: HOSPADM

## 2024-03-30 RX ORDER — LIDOCAINE 40 MG/G
CREAM TOPICAL
Status: CANCELLED | OUTPATIENT
Start: 2024-03-30

## 2024-03-30 RX ORDER — FENTANYL CITRATE 50 UG/ML
25-50 INJECTION, SOLUTION INTRAMUSCULAR; INTRAVENOUS EVERY 5 MIN PRN
Status: DISCONTINUED | OUTPATIENT
Start: 2024-03-30 | End: 2024-03-30 | Stop reason: HOSPADM

## 2024-03-30 RX ORDER — DIPHENHYDRAMINE HYDROCHLORIDE 50 MG/ML
50 INJECTION INTRAMUSCULAR; INTRAVENOUS ONCE
Status: COMPLETED | OUTPATIENT
Start: 2024-03-30 | End: 2024-03-30

## 2024-03-30 RX ORDER — DIPHENHYDRAMINE HYDROCHLORIDE 50 MG/ML
50 INJECTION INTRAMUSCULAR; INTRAVENOUS ONCE
Status: DISCONTINUED | OUTPATIENT
Start: 2024-03-30 | End: 2024-03-30

## 2024-03-30 RX ORDER — HYDRALAZINE HYDROCHLORIDE 20 MG/ML
10 INJECTION INTRAMUSCULAR; INTRAVENOUS EVERY 4 HOURS PRN
Status: DISCONTINUED | OUTPATIENT
Start: 2024-03-30 | End: 2024-04-03

## 2024-03-30 RX ORDER — NALOXONE HYDROCHLORIDE 0.4 MG/ML
0.4 INJECTION, SOLUTION INTRAMUSCULAR; INTRAVENOUS; SUBCUTANEOUS
Status: DISCONTINUED | OUTPATIENT
Start: 2024-03-30 | End: 2024-03-30 | Stop reason: HOSPADM

## 2024-03-30 RX ORDER — NALOXONE HYDROCHLORIDE 0.4 MG/ML
0.2 INJECTION, SOLUTION INTRAMUSCULAR; INTRAVENOUS; SUBCUTANEOUS
Status: DISCONTINUED | OUTPATIENT
Start: 2024-03-30 | End: 2024-03-30 | Stop reason: HOSPADM

## 2024-03-30 RX ORDER — HEPARIN SODIUM 200 [USP'U]/100ML
1 INJECTION, SOLUTION INTRAVENOUS CONTINUOUS PRN
Status: DISCONTINUED | OUTPATIENT
Start: 2024-03-30 | End: 2024-03-30 | Stop reason: HOSPADM

## 2024-03-30 RX ORDER — LIDOCAINE HYDROCHLORIDE 10 MG/ML
1-30 INJECTION, SOLUTION INFILTRATION; PERINEURAL
Status: COMPLETED | OUTPATIENT
Start: 2024-03-30 | End: 2024-03-30

## 2024-03-30 RX ORDER — METHYLPREDNISOLONE SODIUM SUCCINATE 40 MG/ML
40 INJECTION, POWDER, LYOPHILIZED, FOR SOLUTION INTRAMUSCULAR; INTRAVENOUS EVERY 4 HOURS
Status: DISCONTINUED | OUTPATIENT
Start: 2024-03-30 | End: 2024-03-30 | Stop reason: HOSPADM

## 2024-03-30 RX ORDER — IODIXANOL 320 MG/ML
100 INJECTION, SOLUTION INTRAVASCULAR ONCE
Status: COMPLETED | OUTPATIENT
Start: 2024-03-30 | End: 2024-03-30

## 2024-03-30 RX ORDER — SODIUM CHLORIDE, SODIUM LACTATE, POTASSIUM CHLORIDE, CALCIUM CHLORIDE 600; 310; 30; 20 MG/100ML; MG/100ML; MG/100ML; MG/100ML
INJECTION, SOLUTION INTRAVENOUS CONTINUOUS
Status: DISCONTINUED | OUTPATIENT
Start: 2024-03-30 | End: 2024-03-30

## 2024-03-30 RX ADMIN — SODIUM CHLORIDE 1000 ML: 9 INJECTION, SOLUTION INTRAVENOUS at 17:11

## 2024-03-30 RX ADMIN — BETAMETHASONE DIPROPIONATE: 0.5 CREAM TOPICAL at 02:23

## 2024-03-30 RX ADMIN — HEPARIN SODIUM 4000 UNITS: 1000 INJECTION INTRAVENOUS; SUBCUTANEOUS at 12:23

## 2024-03-30 RX ADMIN — ONDANSETRON 4 MG: 2 INJECTION INTRAMUSCULAR; INTRAVENOUS at 01:27

## 2024-03-30 RX ADMIN — SODIUM CHLORIDE 1000 ML: 9 INJECTION, SOLUTION INTRAVENOUS at 15:49

## 2024-03-30 RX ADMIN — METHYLPREDNISOLONE SODIUM SUCCINATE 40 MG: 40 INJECTION, POWDER, FOR SOLUTION INTRAMUSCULAR; INTRAVENOUS at 11:06

## 2024-03-30 RX ADMIN — MIDAZOLAM 0.5 MG: 1 INJECTION INTRAMUSCULAR; INTRAVENOUS at 12:43

## 2024-03-30 RX ADMIN — ACETAMINOPHEN 975 MG: 325 TABLET, FILM COATED ORAL at 00:18

## 2024-03-30 RX ADMIN — AMLODIPINE BESYLATE 2.5 MG: 2.5 TABLET ORAL at 00:15

## 2024-03-30 RX ADMIN — HEPARIN SODIUM 4 BAG: 200 INJECTION, SOLUTION INTRAVENOUS at 12:11

## 2024-03-30 RX ADMIN — LABETALOL HYDROCHLORIDE 10 MG: 5 INJECTION INTRAVENOUS at 02:15

## 2024-03-30 RX ADMIN — MIDAZOLAM 0.5 MG: 1 INJECTION INTRAMUSCULAR; INTRAVENOUS at 14:15

## 2024-03-30 RX ADMIN — ALTEPLASE 0.25 MG/HR: 2.2 INJECTION, POWDER, LYOPHILIZED, FOR SOLUTION INTRAVENOUS at 19:33

## 2024-03-30 RX ADMIN — SODIUM CHLORIDE: 9 INJECTION, SOLUTION INTRAVENOUS at 20:53

## 2024-03-30 RX ADMIN — SODIUM CHLORIDE: 9 INJECTION, SOLUTION INTRAVENOUS at 01:35

## 2024-03-30 RX ADMIN — DEXTROSE MONOHYDRATE 25 ML: 25 INJECTION, SOLUTION INTRAVENOUS at 20:30

## 2024-03-30 RX ADMIN — NICOTINE 1 PATCH: 14 PATCH, EXTENDED RELEASE TRANSDERMAL at 09:38

## 2024-03-30 RX ADMIN — LIDOCAINE HYDROCHLORIDE 10 ML: 10 INJECTION, SOLUTION INFILTRATION; PERINEURAL at 12:21

## 2024-03-30 RX ADMIN — MIDAZOLAM 0.5 MG: 1 INJECTION INTRAMUSCULAR; INTRAVENOUS at 13:33

## 2024-03-30 RX ADMIN — GABAPENTIN 100 MG: 100 CAPSULE ORAL at 00:12

## 2024-03-30 RX ADMIN — DOCUSATE SODIUM 100 MG: 100 CAPSULE, LIQUID FILLED ORAL at 00:19

## 2024-03-30 RX ADMIN — MIDAZOLAM 0.5 MG: 1 INJECTION INTRAMUSCULAR; INTRAVENOUS at 12:04

## 2024-03-30 RX ADMIN — ONDANSETRON 4 MG: 2 INJECTION INTRAMUSCULAR; INTRAVENOUS at 06:22

## 2024-03-30 RX ADMIN — DOCUSATE SODIUM 50 MG AND SENNOSIDES 8.6 MG 1 TABLET: 8.6; 5 TABLET, FILM COATED ORAL at 00:20

## 2024-03-30 RX ADMIN — BETAMETHASONE DIPROPIONATE: 0.5 CREAM TOPICAL at 22:59

## 2024-03-30 RX ADMIN — SODIUM CHLORIDE: 9 INJECTION, SOLUTION INTRAVENOUS at 13:57

## 2024-03-30 RX ADMIN — HYDRALAZINE HYDROCHLORIDE 10 MG: 20 INJECTION INTRAMUSCULAR; INTRAVENOUS at 01:03

## 2024-03-30 RX ADMIN — ROSUVASTATIN CALCIUM 40 MG: 20 TABLET, FILM COATED ORAL at 00:19

## 2024-03-30 RX ADMIN — FENTANYL CITRATE 25 MCG: 50 INJECTION, SOLUTION INTRAMUSCULAR; INTRAVENOUS at 13:08

## 2024-03-30 RX ADMIN — IODIXANOL 96 ML: 320 INJECTION, SOLUTION INTRAVASCULAR at 14:35

## 2024-03-30 RX ADMIN — PROCHLORPERAZINE EDISYLATE 5 MG: 5 INJECTION INTRAMUSCULAR; INTRAVENOUS at 09:31

## 2024-03-30 RX ADMIN — DIPHENHYDRAMINE HYDROCHLORIDE 50 MG: 50 INJECTION, SOLUTION INTRAMUSCULAR; INTRAVENOUS at 11:06

## 2024-03-30 ASSESSMENT — ACTIVITIES OF DAILY LIVING (ADL)
ADLS_ACUITY_SCORE: 30
ADLS_ACUITY_SCORE: 38
ADLS_ACUITY_SCORE: 38
ADLS_ACUITY_SCORE: 45
ADLS_ACUITY_SCORE: 30
ADLS_ACUITY_SCORE: 30
ADLS_ACUITY_SCORE: 45
ADLS_ACUITY_SCORE: 45
ADLS_ACUITY_SCORE: 30
ADLS_ACUITY_SCORE: 30
ADLS_ACUITY_SCORE: 38
ADLS_ACUITY_SCORE: 30
ADLS_ACUITY_SCORE: 45
ADLS_ACUITY_SCORE: 30
ADLS_ACUITY_SCORE: 30
ADLS_ACUITY_SCORE: 45

## 2024-03-30 NOTE — PROCEDURES
[facility]    Procedure: Right lower extremity angiogram.    Date/Time: 3/30/2024 6:38 PM    Performed by: Leonor Salomon DO  Authorized by: Leonor Salomon DO      UNIVERSAL PROTOCOL   Site Marked: NA  Prior Images Obtained and Reviewed:  Yes  Required items: Required blood products, implants, devices and special equipment available    Patient identity confirmed:  Verbally with patient, arm band, provided demographic data and hospital-assigned identification number  Patient was reevaluated immediately before administering moderate or deep sedation or anesthesia  Confirmation Checklist:  Patient's identity using two indicators, relevant allergies, procedure was appropriate and matched the consent or emergent situation and correct equipment/implants were available  Time out: Immediately prior to the procedure a time out was called    Universal Protocol: the Joint Commission Universal Protocol was followed    Preparation: Patient was prepped and draped in usual sterile fashion       ANESTHESIA    Anesthesia:  Local infiltration  Local Anesthetic:  Lidocaine 1% without epinephrine      SEDATION  Patient Sedated: Yes    Sedation Type:  Moderate (conscious) sedation  Vital signs: Vital signs monitored during sedation    See dictated procedure note for full details.  Findings: Right lower extremity angiogram shows active bleeding in the right groin. A 8 x 50 mm Viabahn covered stent was placed and dilated post placement with an 8 and a 9 mm balloon. Post images show persistent bleeding. A branch off of the common femoral artery was selected which shows active extravasation. This was catheterized and coil embolization was performed. Post images show no residual active bleeding.     The bypass is patent with a small amount of thrombus noted in the distal graft, improved post mechanical thrombectomy with angiojet.  However, when the infusion catheter is removed there is  flow flow into the graft when injecting starting in the common femoral artery. Angioplasty was performed at the proximal anastomosis with an 8 mm balloon with no waist and no change in flow.     Access was gained across the distal anastomosis. Images show poor outflow. Angioplasty was performed with a 2 mm balloon across the distal anastomosis and catheter directed lytics was restarted.    TPA: 0.25 mg/hour  Heparin: 500 units/hour.     Specimens: none    Complications: None    Condition: Stable    Plan: NPO at midnight.     Repeat angiogram tomorrow am.       PROCEDURE    Patient Tolerance:  Patient tolerated the procedure well with no immediate complications  Length of time physician/provider present for 1:1 monitoring during sedation: 150

## 2024-03-30 NOTE — SIGNIFICANT EVENT
Significant Event Note    Time of event: 1:48 AM March 30, 2024    Description of event:  The patient developed a headache and nausea and vomiting in setting of hypertension while on a heparin drip.  Stat CT was performed and negative for intracranial hemorrhage.  I evaluated patient after CT.  She reports 7 out of 10 pain in her leg.  She reports a dull 4 out of 10 pain headache.  The patient looks tired and frustrated.  Her cranial nerves II through XII are intact.  She has no focal deficits.  Moving her upper extremities spontaneously.  Right lower extremity is cold with diminished sensation to touch.  Sensation is intact in the left lower extremity.  Her blood pressure is highly labile going up to the 200s and then dropping into the 150s after a single dose of IV antihypertensives.  Will have to monitor blood pressure closely while she is on anticoagulation.    Plan:  As needed hydralazine and nicardipine drip ordered  Will continue heparin drip    Discussed with: bedside nurse    Ann Araya MD

## 2024-03-30 NOTE — PROGRESS NOTES
Vascular Surgery Progress Note    S: Patient in ICU.  Sore at right lower abdomen from hematoma.   Right foot remains cool but motor and sensory intact.    O:   Vitals:  BP  Min: 125/74  Max: 250/111  Temp  Av.2  F (36.8  C)  Min: 97.7  F (36.5  C)  Max: 98.6  F (37  C)  Pulse  Av.2  Min: 54  Max: 122  I/O last 3 completed shifts:  In: 608 [I.V.:608]  Out: 845 [Urine:845]    Physical Exam: In ICU.  Conversant.   Right lower abdomen hematoma that appears stable over the last several hours.   Sheath is in the left groin with no bleeding.   No pulse send right graft   +2 left in situ graft pulse.   Right foot is cool but motor and sensory intact.    Hgb= 8.1  (11.8 prior to angiogram) fibrinogen<61 (previously 244 at 0100 hrs.)    CT of head from earlier this morning is normal.      Assessment/Plan: #1.  Right graft remains occluded.  Interventional radiology tried to access the right limb of the graft to go antegrade apparently with a microneedle and this certainly is the cause of the hematoma.  Ultrasound this morning confirm the hematoma but no obvious active bleeding or pseudoaneurysm and the dacron graft.   Due to hematoma and low fibrinogen tPA placed on hold at 0630 hrs.  Still receiving heparin through the sheath.  Discussed with Dr. Leonor Salomon of interventional radiology who will perform a repeat angiogram this morning.     Review of last evening's angiogram revealed patent aortobifemoral bypass graft.  Multiple collaterals.  Graft was occluded.  Thrombus in only runoff anterior tibial artery.  This certainly makes any type of surgical treatment very questionable on success and thus hoping we can continue with lytic therapy.      #2.  Acute blood loss anemia from hematoma.  Cannot perform CTA of abdomen at this time and since hemodynamically stable and angiogram will see if there is any active extravasation.  With the scar tissue from the graft most likely all of the bleeding is coming  anteriorly.  Follow hemoglobins.    Over 40 minutes with patient this morning we will continue to follow.  Left cell phone message to her sister progress.            Wm. Blake MD

## 2024-03-30 NOTE — PROVIDER NOTIFICATION
MD Notification    Notified Person: MD    Notified Person Name:Chance    Notification Date/Time:    Notification Interaction:    Purpose of Notification:Patient is Hypertensive 204/92; HR 50s, NPO only has PO meds. Requesting IV PRN. She is going to IR for angiogram    Orders Received:    Comments: PRN Hydralazine ordered

## 2024-03-30 NOTE — PRE-PROCEDURE
GENERAL PRE-PROCEDURE:   Procedure:  Right lower extremity angiogram  Date/Time:  3/30/2024 11:43 AM    Written consent obtained?: Yes    Risks and benefits: Risks, benefits and alternatives were discussed    Consent given by:  Patient  Patient states understanding of procedure being performed: Yes    Patient's understanding of procedure matches consent: Yes    Procedure consent matches procedure scheduled: Yes    Expected level of sedation:  Moderate  Appropriately NPO:  Yes  ASA Class:  2  Mallampati  :  Grade 2- soft palate, base of uvula, tonsillar pillars, and portion of posterior pharyngeal wall visible  Lungs:  Lungs clear with good breath sounds bilaterally  Heart:  Normal heart sounds and rate  History & Physical reviewed:  History and physical reviewed and no updates needed  Statement of review:  I have reviewed the lab findings, diagnostic data, medications, and the plan for sedation

## 2024-03-30 NOTE — PROVIDER NOTIFICATION
MD Notification    Notified Person: MD    Notified Person Name: HELADIO Lo    Notification Date/Time: 3/30/24 1:11am    Notification Interaction:Amcom/phone    Purpose of Notification: Patient c/o severe pain on PCA pump, headache, had an emesis. Hematoma and bleeding to right groin access site.    Orders Received:    Comments:Stat head CT, Hold pressure x 20 minutes. Update Hospitalist

## 2024-03-30 NOTE — PROGRESS NOTES
Vascular surgery progress note.     Patient initially seen in IR.  Note initial imaging demonstrates poor outflow of the common femoral artery to mid anterior tibial artery bypass.    Patient initially with severe pain complaining felt as though her leg was exploding.  Reports pain is worse than when she came into the emergency room and is located in her foot and radiates up to her calf.    Patient started on PCA pump.      Reevaluated on arrival to ICU.  Patient reports her pain is now somewhat slightly more manageable and it no longer feels as though her r leg is exploding.    Exam  General: Awake alert.  Appears moderately uncomfortable  Respiratory: On room air  CV: Hypertensive and tachycardic to 120s  Extremities: right lower extremity cool to touch with dusky versus mottling discoloration.  Vascular: No right lower extremity DP or PT signals. Right groin soft, no evidence hematoma. Left groin with small area firmness around catheters.   On first visit immediately post angiogram patient without palpable pulse in bypass graft  On recheck visit in ICU patient now with palpable pulse in bypass graft, still no Doppler signals in foot  LLE with monophasic signals DP/PT    Plan  Pain in right foot is likely combination of reperfusion pain as well as possibly from distal emboli.   Continue heparin drip  Please monitor right lower extremity calf exam closely alert vascular surgery if compartments become firm or patient becomes tender to palpation of side quadrants.   Dilaudid PCA pump ordered to assist with pain control

## 2024-03-30 NOTE — PROGRESS NOTES
[facility]    Medicine Progress Note - Hospitalist Service    Date of Admission:  3/29/2024  Date of Service: 03/30/2024    Assessment & Plan   Shirley Hendricks is a 63 year old female admitted on 3/29/2024 due to occlusion of right LE bypass graft.      Past medical history significant for PAD/PVD, Tobacco use D/O, CAD (history of MI x3), HTN, HLP, DM2, Neuropathy, COPD, GERD, Anemia, Spongiotic dermatitis, MDD with anxiety, Chronic anticoagulation therapy with history of DVT/PE, OA, Charcot-Breonna-Tooth foot deformity, Marginal zone B-cell lymphoma, History of SBO, History of Takotsubo CM, History of SVT.    Discussed with Dr. Mazariegos and reviewed ED notes and Vascular Surgery consult note.  Patient presented to the ED due to right leg pain that had begun ~ 1 hour prior to presentation.  She reported pain seems to worsen with walking and when she attempted to check a pulse in her leg it was absent.  She also described that the foot is colder than the left.      While in the lobby attempts to find right pedal pulse with doppler were unsuccessful.  Dr. Lozano of Vascular Surgery was contacted by the patient as well as Dr. Mazariegos.      Work-up completed while patient was in the lobby included right arterial LE US revealed the right common femoral artery to mid anterior tibial artery bypass graft occlusion.      After assessing patient labs were collected and included a CMP that revealed a sodium of 132, chloride of 97, CO2 of 21 and glucose of 103 otherwise within normal limits.  CBC with differential revealed an RDW of 15.7 otherwise unremarkable.    Right common femoral artery to mid anterior tibial artery bypass graft acute occlusion  PAD/PVD  Acute Blood Loss Anemia  *Occurred despite low-dose Xarelto and Plavix therapy.    - Vascular surgery consult requested.     --IR consulted and proceed with right LE angiogram and lytic therapy today   --Close monitoring in the ICU.    --Hold  off on Heparin infusion with plan to proceed with angiogram in the next several hours.    - IR consult requested.    --Per Vascular surgery consult Dr. Salomon was contacted and plan for right LE angiogram and lytic therapy.    - Hold PTA Xarelto 15 mg nightly.    - Defer resumption of PTA Plavix 75 mg/d to Vascular Surgery and/or IR.    - Statin and antihypertensive management as below.    - Pain management:               --Schedule APAP 975 mg every 8 hours.                 --PRN oxycodone 2.5-5 mg every 4 hours based of pain severity.               --PRN IV dilaudid 0.2-0.4 mg every 2 hours based off severity.    --Resumed on PTA gabapentin as below.    - Follow Hgb   - Cannot perform CTA of abdomen at this time and since hemodynamically stable ->  angiogram will see if there is any active extravasation.     ADDENDUM 1535: SBPs soft in 80s post IR, will transfuse 1 U PRBCs and closely monitor.    Contrast dye allergy  - Ordered solumedrol and benadryl per contrast dye allergy protocol.      Mild hyponatremia  - BMP ordered for AM of 3/30.      Tobacco Use D/O   Patient currently smokes max 5 cigarettes per day.     - Counseled regarding smoking cessation that should also continue with PCP.    - Nicoderm patch ordered.    Recent celiac disease  *Patient reported recent GI work-up revealed she has celiac disease.    - Once diet can be advanced would request no gluten/celiac diet.      CAD (history of MI x3)  HTN  HLP  *Last coronary angiogram completed 10/2023.    - Resumed on PTA Coreg 6.25 mg BID, lisinopril 10 mg/d, Norvasc 2.5 mg/d.  Hold parameters in place.    - Resumed on PTA rosuvastatin 40 mg/d.    - PRN IV hydralazine 10 mg every 4 hours for SBP GREATER THAN 180.    - Monitor on telemetry.      History of Takotsubo CM  *Recovered EF (55-60%) from ECHO 11/2023.    - Resumed on PTA Coreg 6.25 mg BID, lisinopril 10 mg/d and Jardiance 10 mg/d.      Type 2 DM  Diabetic neuropathy  *Noted diagnosis on chart  review.  However, A1c of 5.2%.    - Resumed on PTA Jardiance 10 mg/d and gabapentin 100 mg TID.      COPD   - Resumed on PTA inhalers.    - Encourage use of Flutter valve/IS.      GERD  - Resumed on PTA omeprazole 20 mg/d.      Anemia  Recent GI bleed (12/2023 and admitted at [facility])  - Hold PTA Iron supplements and resume at discharge.    - CBC ordered AM of 3/30.      Spongiotic dermatitis  *Recently seen at outpatient Derm.    - Resumed on PTA betamethasone cream BID.      MDD with anxiety  *Noted on chart review.  No interventions.      Chronic anticoagulation therapy with history of DVT/PE  - Hold PTA Xarelto.      OA  - Pain management as above.      Charcot-Breonna-Tooth foot deformity  *Noted on chart review.  No interventions.     Stage VALENTIN marginal zone B-cell lymphoma  *Follows with [facility] (Dr. Barrow).    - Continue outpatient surveillance (CT scan in 8/2024).      History of SBO  *Noted on chart review.  No interventions.    History of SVT  - Monitor on telemetry.    - Resumed on PTA Coreg as above.            Diet: NPO for Medical/Clinical Reasons Except for: Meds    DVT Prophylaxis: Pneumatic Compression Devices  Alvarez Catheter: PRESENT, indication: Acute retention or obstruction  Lines: PRESENT             Cardiac Monitoring: ACTIVE order. Indication: ICU  Code Status: Full Code      Clinically Significant Risk Factors Present on Admission               # Drug Induced Coagulation Defect: home medication list includes an anticoagulant medication  # Drug Induced Platelet Defect: home medication list includes an antiplatelet medication   # Hypertension: Noted on problem list          # Financial/Environmental Concerns:           Disposition Plan     Expected Discharge Date: 03/31/2024                    Marcin White MD  Hospitalist Service  [facility]  Securely message with [employer] (more info)  Text page via [facility] Paging/Directory    ______________________________________________________________________    Interval History     IR to perform perform a repeat angiogram this morning   No fevers  No new CP/SOB  No new complaints    Physical Exam   Vital Signs: Temp: 96.8  F (36  C) Temp src: Bladder BP: 90/66 Pulse: 90   Resp: 22 SpO2: 97 % O2 Device: Nasal cannula Oxygen Delivery: 2 LPM  Weight: 105 lbs 9.6 oz      Constitutional: Awake, alert, cooperative, no apparent distress.    Pulmonary: CTABL  Cardiovascular: Regular rate and rhythm, normal S1 and S2, no S3 or S4, and no murmur noted.  GI: Normal bowel sounds, soft, non-distended, non-tender.    Skin/Integumen: Visualized skin appeared clear.  Neuro: Fahad  Upper and lower extremities strength, coordination and sensation intact bilaterally.    Psych:  Alert and oriented x 3. Normal affect.  Extremities: No lower extremity edema noted, and calves are non-tender to palpation bilaterally.    ----------------------------------------------------------------------------------------    Medical Decision Making       ------------------ MEDICAL DECISION MAKING ------------------------------------------------------------------------------------------------------  High Complexity Decision Making       Data   ------------------------- PAST 24 HR DATA REVIEWED -----------------------------------------------    I have personally reviewed the following data over the past 24 hrs:    6.5  \   8.2 (L)   / 171     132 (L) 97 (L) 12.3 /  205 (H)   4.2 21 (L) 0.45 (L) \     INR:  N/A PTT:  N/A   D-dimer:  N/A Fibrinogen:  118 (L)       Imaging results reviewed over the past 24 hrs:   Recent Results (from the past 24 hour(s))   US Lower Extremity Arterial Duplex Right    Narrative    US LOWER EXTREMITY ARTERIAL DUPLEX RIGHT  3/29/2024 3:32 PM     HISTORY:  Patient is status post right common femoral artery to mid  anterior tibial artery bypass graft. Peripheral arterial disease. Cold  foot.    COMPARISON:  Ultrasound dated 1/4/2024    FINDINGS: Color Doppler and spectral waveform analysis performed. The  right common femoral artery to mid anterior tibial artery bypass graft  is occluded.    ALBERTO BENITEZ MD         SYSTEM ID:  [id number]   IR Lower Extremity Angiogram Right    Narrative    [facility]  LOCATION: [facility]  DATE: 3/29/2024    PROCEDURE: RIGHT LOWER EXTREMITY ANGIOGRAM AND INITIATION OF CATHETER DIRECTED THROMBOLYTIC THERAPY    INTERVENTIONAL RADIOLOGIST: Héctor Alba MD.    INDICATION: Acute right limb ischemia with thrombosed right common femoral to below-knee bypass graft..    CONSENT: The risks, benefits and alternatives of right lower extremity angiogram with possible additional intervention were discussed with the patient  in detail. All questions were answered. Informed consent was given to proceed with the procedure.    MODERATE SEDATION: Versed 1.5 mg IV; Fentanyl 100 mcg IV.  Under physician supervision, Versed and fentanyl were administered for moderate sedation. Pulse oximetry, heart rate and blood pressure were continuously monitored by an independent trained   observer. The physician spent 75 minutes of face-to-face sedation time with the patient. During the timeout, immediately prior to administration of medications, the patient was reassessed for adequacy to receive conscious sedation.     CONTRAST: 60 mL of Isovue-300  ANTIBIOTICS: None.  ADDITIONAL MEDICATIONS: None.    FLUOROSCOPIC TIME: 15.8 minutes.  RADIATION DOSE: Air Kerma: 66 mGy.    COMPLICATIONS: No immediate complications.    STERILE BARRIER TECHNIQUE: Maximum sterile barrier technique was used. Cutaneous antisepsis was performed at the operative site with application of 2% chlorhexidine and large sterile drape. Prior to the procedure, the  and assistant performed   hand hygiene and wore hat, mask, sterile gown, and sterile gloves during the entire procedure.    PROCEDURE:   Ultrasound was used to evaluate the bilateral groins and saved ultrasound images were obtained of patent bilateral distal segments of the aortobifemoral bypass limbs. Initial attempt at antegrade access of the right common femoral artery was   not successful given there was not enough length for wire purchase. Therefore this was abandoned.    The left groin was anesthetized with 1% lidocaine in a 1 cm incision was made in the skin. It was noted that there was significant scar tissue from previous bypass surgery within the left groin. Under ultrasound guidance a micropuncture needle was used   to access the left common femoral artery limb of the aortobifemoral bypass graft. Through the needle an 018 wire was advanced. Over the 018 wire a stiff micropuncture sheath was placed into the left common femoral bypass limb. Through the Jacoby sheath   a super stiff Amplatz wire was advanced. The Jacoby sheath was removed and serial dilatation of the severely scarred down left groin was performed initially with a 5 Portuguese, 6 Portuguese and 7 Portuguese dilator. Over the stiff Amplatz wire a 6 Portuguese sheath   was advanced into the left common femoral bypass limb. Using a combination of a Omni flush catheter and a stiff Glidewire the wire was manipulated up and over the aortobifemoral bypass graft into the right common femoral limb of the graft. Within Omni   Flush catheter in the distal segment of the right common femoral bypass limb a right lower extremity angiogram was obtained. Through the Omni Flush catheter a stiff Amplatz wire was advanced. The Omni Flush catheter was then removed. The left groin short   6 Portuguese sheath was then exchanged for a 6 Portuguese by 45 cm up and over Ansell sheath. Through the Ansell sheath using a stiff Glidewire and a 5 Portuguese KMP catheter the nipple of the occluded/thrombosed bypass graft was cannulated and the wire was   manipulated through the thrombosed bypass graft eventually into the distal  anastomosis. Contrast was injected through the catheter showing that outflow tibial artery distal to the anastomosis appears to also be occluded. The Glidewire was then exchanged   for a stiff Amplatz wire. Over the stiff Amplatz wire a 50 cm x 135 cm Cragg-Tommy infusion catheter was placed spanning the entire length of the bypass graft.      FINDINGS:  Right lower extremity angiogram confirms the right common femoral to below-knee bypass graft is occluded. Angiogram below the knee just distal to the anastomosis of the graft also shows that the outflow tibial artery also appears occluded.        Impression    IMPRESSION:    Right leg angiogram confirms that the right femoral to below-knee tibial bypass graft is thrombosed. Angiogram below the knee with a catheter also shows that the tibial outflow artery distal to the anastomosis is also occluded.  Placement of a   Cragg-Tommy infusion catheter spanning the entire length of right femoral to below-knee bypass graft for catheter directed thrombolytic therapy. 0.5 mg of alteplase per hour will be infused through the catheter. 500 units of heparin per hour will be   infused through the sheath.  ____________________________________________________________________     CT Head w/o Contrast    Narrative    EXAM: CT HEAD W/O CONTRAST  LOCATION: [facility]  DATE: 3/30/2024    INDICATION: Eval htn and pounding headache  COMPARISON: MRI brain 09/18/2019  TECHNIQUE: Routine CT Head without IV contrast. Multiplanar reformats. Dose reduction techniques were used.    FINDINGS:  INTRACRANIAL CONTENTS: No intracranial hemorrhage, extraaxial collection, or mass effect.  No CT evidence of acute infarct. Stable posttraumatic encephalomalacia in the anterior/inferior frontal lobes and left temporal tip. Normal ventricles and sulci.     VISUALIZED ORBITS/SINUSES/MASTOIDS: No intraorbital abnormality. No paranasal sinus mucosal disease. No middle ear or  mastoid effusion.    BONES/SOFT TISSUES: No acute abnormality.      Impression    IMPRESSION:  1.  No acute intracranial process.  2.  Stable posttraumatic encephalomalacia in the anterior/inferior frontal lobes and left temporal tip.     Us Post Vascular Access Low Ext Duplex    Narrative    EXAM: ULTRASOUND RIGHT LOWER EXTREMITY LIMITED WITH DOPPLER  LOCATION: [facility]  DATE/TIME: 03/30/2024, 4:42 AM CDT    INDICATION: History of aortobifemoral bypass and right femoral to popliteal bypass. Status post attempted vascular access in the right inguinal region on 03/29/2024. Right groin swelling. Thrombolytic catheter in place in the left common femoral artery.  COMPARISON: 03/29/2024.    TECHNIQUE: Focused ultrasound scanning was performed by the ultrasound technologist of the right inguinal region. Spectral waveform and color Doppler evaluation were performed.    FINDINGS: Blood flow with appropriate waveforms are present within the right common and external iliac arteries and common femoral vein. A large heterogeneous mass-like structure with containing hypo and hyperechoic areas is present in the soft tissues   of the right inguinal region, measuring 10.0 x 5.3 x 5.0 cm. No blood flow is visualized in this structure.       Impression    IMPRESSION:   1.  Large 10 x 5 x 5 cm heterogeneous mass-like structure in the soft tissues the right inguinal region. This is nonspecific, but given the clinical history, likely represents a large hematoma or thrombosed pseudoaneurysm.  2.  No patent pseudoaneurysm is visualized in the right inguinal region.          ------------------------- ENCOUNTER LABS ----------------------------------------------------------------  Recent Labs   Lab 03/30/24  1207 03/30/24  0923 03/30/24  0500 03/29/24  1849 03/29/24  1656   WBC 6.5  --  5.2  --  6.0   HGB 8.2*  --  8.1*  --  11.8   MCV 88  --  87  --  86     --  133*  --  195   NA  --   --   --   --  132*    POTASSIUM  --   --   --   --  4.2   CHLORIDE  --   --   --   --  97*   CO2  --   --   --   --  21*   BUN  --   --   --   --  12.3   CR  --   --  0.45*  --  0.67   ANIONGAP  --   --   --   --  14   SONIA  --   --   --   --  9.0   GLC  --  205*  --  101* 103*       Most Recent 3 CBC's:  Recent Labs   Lab Test 03/30/24  1207 03/30/24  0500 03/29/24  1656   WBC 6.5 5.2 6.0   HGB 8.2* 8.1* 11.8   MCV 88 87 86    133* 195     Most Recent 3 BMP's:  Recent Labs   Lab Test 03/30/24  0923 03/30/24  0500 03/29/24  1849 03/29/24  1656 01/08/24  1542 12/11/23  0550   NA  --   --   --  132* 135 133*   POTASSIUM  --   --   --  4.2 5.0 4.4   CHLORIDE  --   --   --  97* 101 104   CO2  --   --   --  21* 23 19*   BUN  --   --   --  12.3 20.5 24.9*   CR  --  0.45*  --  0.67 0.76 0.87   ANIONGAP  --   --   --  14 11 10   SONIA  --   --   --  9.0 9.0 8.3*   *  --  101* 103* 88 86     Most Recent 2 LFT's:  Recent Labs   Lab Test 10/07/23  1950 10/03/23  0737   AST 18 25   ALT 15 22   ALKPHOS 35 82   BILITOTAL 0.2 0.9     Most Recent 3 INR's:  Recent Labs   Lab Test 10/07/23  0516 10/06/23  0551 10/05/23  1014   INR 1.38* 1.08 0.95     Most Recent 3 Troponin's:  Recent Labs   Lab Test 06/10/20  1243 02/16/20  1824 09/18/19  0739   TROPI <0.015 <0.015 <0.015     Most Recent 3 BNP's:No lab results found.  Most Recent D-dimer:  Recent Labs   Lab Test 08/13/21  0934   DD 10.38*     Most Recent Cholesterol Panel:  Recent Labs   Lab Test 10/03/23  0941   CHOL 137   LDL 69   HDL 54   TRIG 68     Most Recent 6 Bacteria Isolates From Any Culture (See EPIC Reports for Culture Details):No lab results found.  Most Recent TSH and T4:  Recent Labs   Lab Test 01/05/21  1119 02/04/19  0934 03/28/17  0922   TSH 0.77   < > 1.37   T4  --   --  1.19    < > = values in this interval not displayed.     Most Recent Urinalysis:  Recent Labs   Lab Test 02/04/19  0935   COLOR Yellow   APPEARANCE Clear   URINEGLC Negative   URINEBILI Negative    URINEKETONE Negative   SG 1.010   UBLD Negative   URINEPH 6.0   PROTEIN Negative   UROBILINOGEN 0.2   NITRITE Negative   LEUKEST Negative   RBCU O - 2   WBCU 0 - 5     Most Recent ESR & CRP:  Recent Labs   Lab Test 11/13/23  1705   SED 39*

## 2024-03-30 NOTE — IR NOTE
Interventional Radiology Intra-procedural Nursing Note    Patient Name: Shirley Hendricks  Medical Record Number: [id number]  Today's Date: March 30, 2024    Procedure: RLE angiogram, stent placement, embolization coils, angioplasty, continuation of intra-arterial lytics and IV moderate sedation  Start time: 1200  End time: 1427  Report provided to: MARISSA Pena  Patient depart time and location: 1450 to ICU Room 364    Note: Patient entered Interventional Radiology Suite number 2 via cart. Patient awake, alert and oriented. Assisted onto procedural table in supine position. Prepped and draped.  Dr. Salomon in room. Time out and procedure started. Vital signs stable. Telemetry reading NSR.    Procedure well tolerated by patient without complications. Procedure end with debrief by Dr. Salomon.  Quick clot and tegaderm dressing applied to left interventional procedure access site, dressing is c/d/I.    Right calf measurement = 30 cm    Administered medication totals:  Lidocaine 1% 10 mL Intradermal  Heparin 4,000 units IVP  Versed 2 mg IVP  Fentanyl 25 mcg IVP  Heparin 500 Units/hr continuous drip through arterial sheath  TPA 0.25 mg/hr continuous drip through infusion catheter    Last dose of sedation administered at 1415.

## 2024-03-30 NOTE — ED PROVIDER NOTES
History     Chief Complaint:  Leg Pain       HPI   Shirley Hendricks is a 63 year old female who presents to the emergency department with a cold right foot.  This patient has had significant vasculopathy and past fall underwent a femoral to mid tibial bypass on the right.  About an hour before presenting she noted pain in her right foot and it was cool she could no longer feel the pulse in her mid tibia which she had been able to feel.  Dr. Lozano has been her surgeon.  Unfortunately she still smokes      Independent Historian:    Patient    Review of External Notes:  As noted    Medications:    No current outpatient medications on file.  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    Embolism and thrombosis of unspecified artery (H) 08/2000    Gastroesophageal reflux disease     Hyperlipidaemia     Hypertension     Lupus (H)     Mild major depression (H24) 11/07/2017    Myocardial infarction (H)     Osteoarthrosis, unspecified whether generalized or localized, unspecified site     PAD (peripheral artery disease) (H24)     Peripheral vascular disease, unspecified (H24) 12/2000    Post-menopausal     Reflux esophagitis 02/2004    SBO (small bowel obstruction) (H) 08/10/2021    SVT (supraventricular tachycardia)     Thrombocytopenia (H24)     Uncomplicated asthma     Vitamin C deficiency 08/12/2018       Past Surgical History:    Past Surgical History:   Procedure Laterality Date    ANGIOGRAM      ANGIOGRAM Right 6/23/2021    Procedure: RIGHT LOWER EXTREMITY ANGIOGRAM WITH LEFT BRACHIAL ARTERY CUTDOWN;  Surgeon: José Luis Hernandez MD;  Location: [facility] OR    BIOPSY MASS NECK Right 10/11/2023    Procedure:  Right Parotid Biopsy;  Surgeon: Randal Mendoza MD;  Location:  OR    BYPASS GRAFT FEMOROPOPLITEAL Right 09/23/2020    Procedure: RIGHT FEMORAL GRAFT TO ABOVE-KNEE POPLITEAL BYPASS USING CADAVERIC SUPERFICIAL FEMORAL ARTERY;  Surgeon: Chadwick Lzoano MD;  Location:  OR    BYPASS GRAFT FEMOROPOPLITEAL Right 2/15/2022    Procedure: RIGHT SUPERFICIAL FEMORAL ARTERY GRAFT TO BELOW KNEE POPLITEAL BYPASS WITH CADAVERIC CRYOLIFE  FEMORAL-POPLITEAL ARTERY SIZE: OUTER DIAMETER: 7MM - 6MM;  Surgeon: Chadwick Lozano;  Location: [facility] OR    BYPASS GRAFT FEMOROPOPLITEAL Right 5/26/2023    Procedure: RIGHT DISTAL SUPERFICIAL FEMORAL GRAFT TO ANTERIOR TIBIAL ARTERY WITH 26 CENTIMETER CADAVERIC SUPERFICIAL FEMORAL ARTERY GRAFT;  Surgeon: Chadwick Lozano MD;  Location:  OR    BYPASS GRAFT FEMOROPOPLITEAL Right 10/11/2023    Procedure: RIGHT FEMORAL TO POPLITEAL GRAFT THROMBECTOMY;  Surgeon: Chadwick Lozano MD;  Location:  OR    BYPASS GRAFT INSITU FEMOROPOPLITEAL Right 7/7/2021    Procedure: CREATION RIGHT FEMORAL TO POPLITEAL ARTERIAL BYPASS USING INSITU VEIN GRAFT;  Surgeon: José Luis Hernandez MD;  Location:  OR    CARDIAC CATHERIZATION  09/03/2009    multivessel CAD without target lesions, med mgmt indicated, preserved ef    CARPAL TUNNEL RELEASE RT/LT Right 05/20/2021    COLONOSCOPY N/A 12/12/2023    Procedure: Colonoscopy;  Surgeon: Corey Hoffman MD;  Location:  GI    COLONOSCOPY N/A 12/14/2023    Procedure: Colonoscopy;  Surgeon: Corey Hoffman MD;  Location:  GI    CV CORONARY ANGIOGRAM N/A 10/4/2023    Procedure: Coronary Angiogram;  Surgeon: Rogelio Ricks MD;  Location:  [facility]    CV PCI N/A 10/4/2023    Procedure: Percutaneous Coronary Intervention;  Surgeon: Rogelio Ricks MD;  Location:  [facility]    EMBOLECTOMY LOWER EXTREMITY Right 10/6/2023    Procedure: Right anterior tibial bypass with graft, Right  tibial endarterectomy,thrombectomy, Right doraslis pedis thrombectomy, Anterior Tibial vein patch.;  Surgeon: Chadwick Lozano MD;  Location:  OR    ENDARTERECTOMY CAROTID Right 05/11/2016    Procedure: ENDARTERECTOMY CAROTID;  Surgeon: Chadwick Lozano MD;  Location:  OR    ENDARTERECTOMY CAROTID Left 06/08/2020    Procedure: LEFT CAROTID ENDARTERECTOMY with distal facal vein patch  and EEG;  Surgeon: Chadwick Lozano MD;  Location:  OR    ESOPHAGOSCOPY, GASTROSCOPY, DUODENOSCOPY (EGD), COMBINED N/A 12/12/2023    Procedure: Esophagoscopy, gastroscopy, duodenoscopy (EGD), combined;  Surgeon: Corey Hoffman MD;  Location:  GI    FINE NEEDLE ASPIRATION WITHOUT IMAGING GUIDANCE Right 9/22/2023    Procedure: Fine needle aspiration without imaging guidance;  Surgeon: Kiersten Aguilera MD;  Location:  GI    FLUORESCENCE INTRAOPERATIVE VASCULAR ANGIOGRAPHY Right 12/28/2022    Procedure: RIGHT LEG ANGIOGRAM with intervention;  Surgeon: Chadwick Lozano MD;  Location:  OR    GYN SURGERY  left tube    left salpingectomy    IR ANGIOGRAM THROUGH CATHETER (ARTERIAL)  10/6/2023    IR ANGIOGRAM THROUGH CATHETER (ARTERIAL)  10/6/2023    IR LOWER EXTREMITY ANGIOGRAM RIGHT  05/25/2021    IR LOWER EXTREMITY ANGIOGRAM RIGHT  10/5/2023    IR OR ANGIOGRAM  6/23/2021    IR OR ANGIOGRAM  12/28/2022    LAPAROSCOPIC CHOLECYSTECTOMY N/A 09/27/2017    Procedure: LAPAROSCOPIC CHOLECYSTECTOMY;  LAPAROSCOPIC CHOLECYSTECTOMY;  Surgeon: Jacoby Tapia MD;  Location:  [facility]    LAPAROSCOPY DIAGNOSTIC (GENERAL) N/A 8/11/2021    Procedure: Exploratory laparoscopy,  laparoscopic lysis of adhesions, laparotomy;  Surgeon: Corina Ferreira MD;  Location:  OR    OR ANGIOGRAM, LOWER EXTREMITY Right 10/11/2023    Procedure: Or Angiogram, Lower Extremity;  Surgeon: Chadwick Lozano MD;  Location:  OR    ORTHOPEDIC SURGERY      left knee surgery    REPAIR ANEURYSM FEMORAL ARTERY Left  "12/28/2022    Procedure: REPAIR LEFT FEMORAL PSEUDOANEURYSM;  Surgeon: Chadwick Lozano MD;  Location:  OR    VASCULAR SURGERY  aoto bi fem  left fem-AK pop    [facility] FABRIC WRAPPING OF ABDOMINAL ANEURYSM      [facility] NONSPECIFIC PROCEDURE  12/2000    angioplasty with stent right fem. a.    [facility] NONSPECIFIC PROCEDURE  1987    sinus surgery    [facility] NONSPECIFIC PROCEDURE  09/02/2009    Emergent left groin exploration with oversewing of bleeding angiographic site. 2. Endarterectomy of common femoral-proximal superficial femoral artery with greater saphenous vein patch angioplasty.   a. [place] of accessory right greater saphenous vein.     [facility] NONSPECIFIC PROCEDURE  08/27/2009    occluded left common iliac and external iliac arteries were successfully revascularized with stenting to 8 and 7 mm           Physical Exam   Patient Vitals for the past 24 hrs:   BP Temp Temp src Pulse Resp SpO2 Height Weight   03/29/24 2130 (!) 160/72 -- -- 59 12 99 % -- --   03/29/24 2125 (!) 168/68 -- -- 58 14 99 % -- --   03/29/24 2120 (!) 174/72 -- -- 59 16 97 % -- --   03/29/24 2115 (!) 177/74 -- -- 57 14 93 % -- --   03/29/24 2110 -- -- -- 72 15 98 % -- --   03/29/24 2105 (!) 195/92 -- -- 64 (!) 33 98 % -- --   03/29/24 2100 (!) 195/90 -- -- 61 12 97 % -- --   03/29/24 2055 -- -- -- -- 14 -- -- --   03/29/24 2015 (!) 204/92 -- -- 57 (!) 8 97 % -- --   03/29/24 2000 (!) 190/85 -- -- 55 17 94 % -- --   03/29/24 1930 -- -- -- 57 11 97 % -- --   03/29/24 1915 (!) 177/84 -- -- 54 13 97 % -- --   03/29/24 1900 (!) 173/80 -- -- 56 15 96 % -- --   03/29/24 1845 (!) 190/82 -- -- 56 15 99 % -- --   03/29/24 1830 (!) 175/96 98.1  F (36.7  C) Oral 62 20 98 % -- 48 kg (105 lb 14.4 oz)   03/29/24 1431 (!) 191/83 97.9  F (36.6  C) Temporal 62 16 99 % 1.549 m (5' 1\") 49.9 kg (110 lb)        Physical Exam  Constitutional: Middle-aged white female sitting in a wheelchair no respiratory distress  HENT: No signs of trauma.   Eyes: EOM are normal. Pupils " are equal, round, and reactive to light.   Neck: Normal range of motion. No JVD present. No cervical adenopathy.  Cardiovascular: Regular rhythm.  Exam reveals no gallop and no friction rub.    No murmur heard.  Pulmonary/Chest: Bilateral breath sounds normal. No wheezes, rhonchi or rales.  Abdominal: Soft. No tenderness. No rebound or guarding.   Musculoskeletal: Left ankle in splint.  Right leg reveals strong femoral pulse, absent popliteal dorsal pedal and posterior tibial pulses.  Site of bypass at the right mid lateral tibia reveals no pulse.  Patient has normal strength distally but diminished sensation to touch her right foot is cool and pale.  Lymphadenopathy: No lymphadenopathy.   Neurological: Alert and oriented to person, place, and time. Normal strength. Coordination normal.   Skin: Skin is warm and dry. No rash noted. No erythema.      Emergency Department Course       Imaging:  US Lower Extremity Arterial Duplex Right   Final Result      IR Lower Extremity Angiogram Right    (Results Pending)       Laboratory:  Labs Ordered and Resulted from Time of ED Arrival to Time of ED Departure   BASIC METABOLIC PANEL - Abnormal       Result Value    Sodium 132 (*)     Potassium 4.2      Chloride 97 (*)     Carbon Dioxide (CO2) 21 (*)     Anion Gap 14      Urea Nitrogen 12.3      Creatinine 0.67      GFR Estimate >90      Calcium 9.0      Glucose 103 (*)    CBC WITH PLATELETS AND DIFFERENTIAL - Abnormal    WBC Count 6.0      RBC Count 4.21      Hemoglobin 11.8      Hematocrit 36.3      MCV 86      MCH 28.0      MCHC 32.5      RDW 15.7 (*)     Platelet Count 195      % Neutrophils 71      % Lymphocytes 15      % Monocytes 10      % Eosinophils 3      % Basophils 1      % Immature Granulocytes 0      NRBCs per 100 WBC 0      Absolute Neutrophils 4.2      Absolute Lymphocytes 0.9      Absolute Monocytes 0.6      Absolute Eosinophils 0.2      Absolute Basophils 0.1      Absolute Immature Granulocytes 0.0       Absolute NRBCs 0.0          Procedures   None    Emergency Department Course & Assessments:    Interventions:  Medications   prochlorperazine (COMPAZINE) injection 5 mg (has no administration in time range)     Or   prochlorperazine (COMPAZINE) tablet 5 mg (has no administration in time range)     Or   prochlorperazine (COMPAZINE) suppository 12.5 mg (has no administration in time range)   metoclopramide (REGLAN) tablet 5 mg (has no administration in time range)     Or   metoclopramide (REGLAN) injection 5 mg (has no administration in time range)   docusate sodium (COLACE) capsule 100 mg (has no administration in time range)   amLODIPine (NORVASC) tablet 2.5 mg (has no administration in time range)   carvedilol (COREG) tablet 6.25 mg (has no administration in time range)   empagliflozin (JARDIANCE) tablet 10 mg (has no administration in time range)   fluticasone-vilanterol (BREO ELLIPTA) 200-25 MCG/ACT inhaler 1 puff (has no administration in time range)   gabapentin (NEURONTIN) capsule 100 mg (has no administration in time range)   lisinopril (ZESTRIL) tablet 10 mg (has no administration in time range)   omeprazole (PriLOSEC) CR capsule 20 mg (has no administration in time range)   rosuvastatin (CRESTOR) tablet 40 mg (has no administration in time range)   nicotine (NICODERM CQ) 14 MG/24HR 24 hr patch 1 patch (has no administration in time range)   lidocaine 1 % 0.1-1 mL (has no administration in time range)   lidocaine (LMX4) cream (has no administration in time range)   sodium chloride (PF) 0.9% PF flush 3 mL (has no administration in time range)   sodium chloride (PF) 0.9% PF flush 3 mL (has no administration in time range)   methylPREDNISolone sodium succinate (SOLU-MEDROL) injection 40 mg (40 mg Intravenous $Given 3/29/24 2007)   glucose gel 15-30 g (has no administration in time range)     Or   dextrose 50 % injection 25-50 mL (has no administration in time range)     Or   glucagon injection 1 mg (has no  administration in time range)   Contraindications to both pharmacological and mechanical prophylaxis (must document contraindications for both in this order) (has no administration in time range)   sodium chloride 0.9 % infusion (has no administration in time range)   acetaminophen (TYLENOL) tablet 650 mg (has no administration in time range)     Or   acetaminophen (TYLENOL) solution 650 mg (has no administration in time range)   ondansetron (ZOFRAN ODT) ODT tab 4 mg (has no administration in time range)     Or   ondansetron (ZOFRAN) injection 4 mg (has no administration in time range)   senna-docusate (SENOKOT-S/PERICOLACE) 8.6-50 MG per tablet 1 tablet (has no administration in time range)     Or   senna-docusate (SENOKOT-S/PERICOLACE) 8.6-50 MG per tablet 2 tablet (has no administration in time range)   polyethylene glycol (MIRALAX) Packet 17 g (has no administration in time range)   bisacodyl (DULCOLAX) suppository 10 mg (has no administration in time range)   naloxone (NARCAN) injection 0.2 mg (has no administration in time range)     Or   naloxone (NARCAN) injection 0.4 mg (has no administration in time range)     Or   naloxone (NARCAN) injection 0.2 mg (has no administration in time range)     Or   naloxone (NARCAN) injection 0.4 mg (has no administration in time range)   alteplase (ACTIVASE)  Line 1 (Arterial Infusion Catheter) 5 mg/500 mL infusion (has no administration in time range)   heparin drip (IR Line 1) 25,000 units in D5W 250 mL (has no administration in time range)   betamethasone dipropionate (DIPROSONE) 0.05 % cream (has no administration in time range)   hydrALAZINE (APRESOLINE) injection 10 mg (10 mg Intravenous $Given 3/29/24 2032)   midazolam (VERSED) injection 0.5-2 mg (0.5 mg Intravenous $Given 3/29/24 2127)   flumazenil (ROMAZICON) injection 0.2 mg (has no administration in time range)   fentaNYL (PF) (SUBLIMAZE) injection 25-50 mcg (25 mcg Intravenous $Given 3/29/24 2132)   naloxone  (NARCAN) injection 0.2 mg (has no administration in time range)     Or   naloxone (NARCAN) injection 0.4 mg (has no administration in time range)     Or   naloxone (NARCAN) injection 0.2 mg (has no administration in time range)     Or   naloxone (NARCAN) injection 0.4 mg (has no administration in time range)   iodixanol (VISIPAQUE 320) injection 100 mL (has no administration in time range)   heparin 2 Units/mL in 0.9% NaCl (500 mL) (5 Bags TABLE SOLN $New Bag 3/29/24 2133)   acetaminophen (TYLENOL) tablet 975 mg (has no administration in time range)   HYDROmorphone (DILAUDID) injection 0.2 mg (has no administration in time range)   HYDROmorphone (PF) (DILAUDID) injection 0.5 mg (has no administration in time range)   oxyCODONE IR (ROXICODONE) half-tab 2.5 mg (has no administration in time range)   oxyCODONE (ROXICODONE) tablet 5 mg (has no administration in time range)   diphenhydrAMINE (BENADRYL) injection 50 mg (50 mg Intravenous $Given 3/29/24 2005)   lidocaine 1 % 1-30 mL (17 mLs Intradermal $Given by Other 3/29/24 2129)   hydrALAZINE (APRESOLINE) injection 10 mg (10 mg Intravenous $Given 3/29/24 2112)        Assessments:  Seen and evaluated    Independent Interpretation (X-rays, CTs, rhythm strip):  None    Consultations/Discussion of Management or Tests:  Dr. Lozano       Social Determinants of Health affecting care:  None     Disposition:  Admit    Impression & Plan        Medical Decision Making:  Patient presents with a cool pulseless right foot for 1 hour.  Patient went for emergency arterial ultrasound which revealed arterial obstruction.  Dr. Lozano was immediately contacted and has made arrangements for thrombolytics and interventional radiology.  Patient will be then admitted to ICU.  Patient has been discussed with hospitalist as well.  Heparin was not recommended.        Diagnosis:    ICD-10-CM    1. Arterial occlusion  I70.90            Discharge Medications:  Current Discharge Medication List              Akil Mazariegos MD  3/29/2024   Sofia Cristobal MD Steinman, Randall Ira, MD  03/29/24 [id number]

## 2024-03-30 NOTE — IR NOTE
Interventional Radiology Intra-procedural Nursing Note    Patient Name: Shirley Hendricks  Medical Record Number: [id number]  Today's Date: March 29, 2024    Procedure: RLE angiogram with initation of arterial lytics with IV moderate sedation  Start time: 2122  End time: 2217  Report provided to: MARISSA Stoddard  Patient depart time and location: 2250 to ICU Room 364    Note: Patient entered Interventional Radiology Suite number 2 via cart. Patient awake, alert and oriented. Assisted onto procedural table in supine position. Prepped and draped.  Dr. Alba in room. Time out and procedure started. Vital signs stable. Telemetry reading NSR/SB.    Procedure well tolerated by patient without complications. Procedure end with debrief by Dr. Alba.   Quick clot and tegaderm dressing applied to left interventional procedure access site, dressing is c/d/I.    Right calf measurement = 30 cm, site marked    Administered medication totals:  Lidocaine 1% 27 mL Intradermal  Heparin 500 Units/hr continuous drip  TPA 0.5 mg/hr continuous drip   Hydralazine 10 mg IVP  Versed 3.5 mg IVP  Fentanyl 100 mcg IVP  TPA 4 mg IA    Last dose of sedation administered at 2231.

## 2024-03-30 NOTE — PLAN OF CARE
Problem: Adult Inpatient Plan of Care  Goal: Optimal Comfort and Wellbeing  Outcome: Progressing  Intervention: Monitor Pain and Promote Comfort  Recent Flowsheet Documentation  Taken 3/30/2024 0400 by Arlyn Escalante RN  Pain Management Interventions:   medication (see MAR)   pain pump in use  Taken 3/30/2024 0158 by Arlyn Escalante RN  Pain Management Interventions: medication (see MAR)  Taken 3/30/2024 0000 by Arlyn Escalante RN  Pain Management Interventions:   medication (see MAR)   care clustered   pain pump in use   rest  Taken 3/29/2024 2000 by Arlyn Escalante RN  Pain Management Interventions:   care clustered   rest   relaxation techniques promoted  Intervention: Provide Person-Centered Care  Recent Flowsheet Documentation  Taken 3/30/2024 0400 by Arlyn Escalante RN  Trust Relationship/Rapport:   care explained   choices provided   emotional support provided   empathic listening provided   questions answered   questions encouraged   reassurance provided  Taken 3/30/2024 0000 by Arlyn Escalante RN  Trust Relationship/Rapport:   care explained   choices provided   emotional support provided   empathic listening provided   questions answered   questions encouraged   reassurance provided  Taken 3/29/2024 2000 by Arlyn Escalante RN  Trust Relationship/Rapport:   care explained   choices provided   emotional support provided   empathic listening provided   questions answered   questions encouraged   reassurance provided   Goal Outcome Evaluation:      Plan of Care Reviewed With: patient    Overall Patient Progress: improvingOverall Patient Progress: improving    Outcome Evaluation: Patient alert and oriented; Neuro checks intact. Was Hypertensive; BP managed with PRN Hydralazine and Labetalol x1. On room air. On PCA pump for pain management. Continues on Heparin; Alteplase stopped per Vascular surgery order see Vascular surgery for overnight events. Remains on bedrest. Plan is for repeat angiogram  today. Sister Yue updated.

## 2024-03-30 NOTE — PROGRESS NOTES
Vascular Progress Note    Paged regarding bleeding in right groin with hematoma, patient with pounding headache, hypertension with systolics in 170s-180s with PRNs, then updated again shortly after patient now with emesis.     Concern for intracranial hemorrhage.   Stat noncon head CT ordered. CT called.   Continue heparin drip, lytics, - may change  pending ct results.     For right groin-  4x4 on groin saturated per bedside nurse with area of firmness - plan to hold pressure for at least 20 minutes and reassess at that time.       Discussed with medicine primary.     Update: head CT negative for intracranial bleeding - continue lytics at this time, monitor patient status closely, appreciate medicine assistance.       Discussed with Dr. Hernandez, vascular surgery staff

## 2024-03-30 NOTE — PROVIDER NOTIFICATION
"MD Notification    Notified Person: MD    Notified Person Name: CURT Lo Vascular Surgery    Notification Date/Time:3/30/24 3:25am    Notification Interaction: [employer]    Purpose of Notification: Patient's right groin hematoma increasing in size, she is c/o discomfort there, states \"It's killing me\"    Orders Received:    Comments: Apply pressure, Resident coming to see patient.  "

## 2024-03-30 NOTE — PROGRESS NOTES
"Vascular Surgery Progress Note    Patient with increased size of right groin hematoma despite holding pressure for 30min x2. Stat US ordered. Patient evaluated at bedside.     Patient denies lightheadedness, dizziness, chest pain, shortness of breath. Does have continued pain in RLE however pain control improved from prior. Continues to get sudden onset nausea she feels is associated with sips of water. She did have moderate volume emesis during my visit.     BP (!) 145/80   Pulse 68   Temp 98.2  F (36.8  C)   Resp 10   Ht 1.549 m (5' 1\")   Wt 48 kg (105 lb 14.4 oz)   LMP  (LMP Unknown)   SpO2 94%   BMI 20.01 kg/m    BP stable in 130s-140s, regular HR, HDS  Firm mass in RLQ of patient's abdomen, tender to palpation.  Palpable pulse in RLE bypass graft.  No DP or PT doppler signal RLE appreciated.   L groin with small area firmness around access site, stable from prior exams.  LLE with monophasic DP/PT signal.       US shows [id number] hematoma in R groin, no active flow seen on this imaging.     Hgb pending, 11.8 last check 5pm prior to lytics.    Patient continues on lytics at this time with heparin through sheath. Initial access for lytics was attempted antegrade via R groin, no sheaths were placed. 10 minutes pressure was held on the R groin after attempted access was aborted. In the setting of heparin and lytics this has ongoing bleeding, suspect pressure has been unsuccessful as it appears to have been a higher stick.     Patient is HDS with no signs of active flow in hematoma on US, will continue to monitor for now given this her stable status, continue lytics and heparin at this time. Will reassess in the next hour-two.     Discussed with Dr. Mary Lo MD   Vascular Surgery PGY3    "

## 2024-03-31 ENCOUNTER — APPOINTMENT (OUTPATIENT)
Dept: GENERAL RADIOLOGY | Facility: CLINIC | Age: 64
DRG: 270 | End: 2024-03-31
Attending: NURSE PRACTITIONER
Payer: COMMERCIAL

## 2024-03-31 ENCOUNTER — APPOINTMENT (OUTPATIENT)
Dept: INTERVENTIONAL RADIOLOGY/VASCULAR | Facility: CLINIC | Age: 64
DRG: 270 | End: 2024-03-31
Attending: RADIOLOGY
Payer: COMMERCIAL

## 2024-03-31 ENCOUNTER — APPOINTMENT (OUTPATIENT)
Dept: ULTRASOUND IMAGING | Facility: CLINIC | Age: 64
DRG: 270 | End: 2024-03-31
Attending: STUDENT IN AN ORGANIZED HEALTH CARE EDUCATION/TRAINING PROGRAM
Payer: COMMERCIAL

## 2024-03-31 LAB
ALLEN'S TEST: YES
ANION GAP SERPL CALCULATED.3IONS-SCNC: 19 MMOL/L (ref 7–15)
BASE EXCESS BLDA CALC-SCNC: -13.8 MMOL/L (ref -3–3)
BASE EXCESS BLDV CALC-SCNC: -14.4 MMOL/L (ref -3–3)
BLD PROD TYP BPU: NORMAL
BLD PROD TYP BPU: NORMAL
BLOOD COMPONENT TYPE: NORMAL
BLOOD COMPONENT TYPE: NORMAL
BUN SERPL-MCNC: 42.5 MG/DL (ref 8–23)
CALCIUM SERPL-MCNC: 6 MG/DL (ref 8.8–10.2)
CHLORIDE SERPL-SCNC: 109 MMOL/L (ref 98–107)
CK SERPL-CCNC: 1963 U/L (ref 26–192)
CK SERPL-CCNC: 3360 U/L (ref 26–192)
CODING SYSTEM: NORMAL
CODING SYSTEM: NORMAL
COHGB MFR BLD: 86.2 % (ref 96–97)
CREAT SERPL-MCNC: 1.61 MG/DL (ref 0.51–0.95)
CROSSMATCH: NORMAL
DEPRECATED HCO3 PLAS-SCNC: 11 MMOL/L (ref 22–29)
EGFRCR SERPLBLD CKD-EPI 2021: 35 ML/MIN/1.73M2
ERYTHROCYTE [DISTWIDTH] IN BLOOD BY AUTOMATED COUNT: 15.9 % (ref 10–15)
FIBRINOGEN PPP-MCNC: 184 MG/DL (ref 170–490)
FIBRINOGEN PPP-MCNC: 92 MG/DL (ref 170–490)
GLUCOSE BLDC GLUCOMTR-MCNC: 104 MG/DL (ref 70–99)
GLUCOSE BLDC GLUCOMTR-MCNC: 108 MG/DL (ref 70–99)
GLUCOSE BLDC GLUCOMTR-MCNC: 124 MG/DL (ref 70–99)
GLUCOSE BLDC GLUCOMTR-MCNC: 152 MG/DL (ref 70–99)
GLUCOSE BLDC GLUCOMTR-MCNC: 66 MG/DL (ref 70–99)
GLUCOSE BLDC GLUCOMTR-MCNC: 76 MG/DL (ref 70–99)
GLUCOSE BLDC GLUCOMTR-MCNC: 97 MG/DL (ref 70–99)
GLUCOSE BLDC GLUCOMTR-MCNC: 98 MG/DL (ref 70–99)
GLUCOSE SERPL-MCNC: 84 MG/DL (ref 70–99)
HCO3 BLD-SCNC: 13 MMOL/L (ref 21–28)
HCO3 BLDV-SCNC: 13 MMOL/L (ref 21–28)
HCT VFR BLD AUTO: 26.4 % (ref 35–47)
HGB BLD-MCNC: 8.5 G/DL (ref 11.7–15.7)
ISSUE DATE AND TIME: NORMAL
ISSUE DATE AND TIME: NORMAL
LACTATE SERPL-SCNC: 1.1 MMOL/L (ref 0.7–2)
MCH RBC QN AUTO: 28 PG (ref 26.5–33)
MCHC RBC AUTO-ENTMCNC: 32.2 G/DL (ref 31.5–36.5)
MCV RBC AUTO: 87 FL (ref 78–100)
O2/TOTAL GAS SETTING VFR VENT: 70 %
O2/TOTAL GAS SETTING VFR VENT: 90 %
OXYHGB MFR BLDV: 77 % (ref 70–75)
PCO2 BLD: 35 MM HG (ref 35–45)
PCO2 BLDV: 38 MM HG (ref 40–50)
PH BLD: 7.19 [PH] (ref 7.35–7.45)
PH BLDV: 7.15 [PH] (ref 7.32–7.43)
PLATELET # BLD AUTO: 131 10E3/UL (ref 150–450)
PO2 BLD: 58 MM HG (ref 80–105)
PO2 BLDV: 50 MM HG (ref 25–47)
POTASSIUM SERPL-SCNC: 5.3 MMOL/L (ref 3.4–5.3)
RBC # BLD AUTO: 3.04 10E6/UL (ref 3.8–5.2)
SAO2 % BLDA: 84 % (ref 92–100)
SAO2 % BLDV: 78.4 % (ref 70–75)
SODIUM SERPL-SCNC: 139 MMOL/L (ref 135–145)
UFH PPP CHRO-ACNC: 0.46 IU/ML
UNIT ABO/RH: NORMAL
UNIT ABO/RH: NORMAL
UNIT NUMBER: NORMAL
UNIT NUMBER: NORMAL
UNIT STATUS: NORMAL
UNIT STATUS: NORMAL
UNIT TYPE ISBT: 6200
UNIT TYPE ISBT: 6200
WBC # BLD AUTO: 10.7 10E3/UL (ref 4–11)

## 2024-03-31 PROCEDURE — 71045 X-RAY EXAM CHEST 1 VIEW: CPT

## 2024-03-31 PROCEDURE — 36415 COLL VENOUS BLD VENIPUNCTURE: CPT

## 2024-03-31 PROCEDURE — 250N000009 HC RX 250: Performed by: RADIOLOGY

## 2024-03-31 PROCEDURE — 250N000009 HC RX 250: Performed by: STUDENT IN AN ORGANIZED HEALTH CARE EDUCATION/TRAINING PROGRAM

## 2024-03-31 PROCEDURE — 85384 FIBRINOGEN ACTIVITY: CPT

## 2024-03-31 PROCEDURE — 250N000011 HC RX IP 250 OP 636: Performed by: RADIOLOGY

## 2024-03-31 PROCEDURE — 04CP3ZZ EXTIRPATION OF MATTER FROM RIGHT ANTERIOR TIBIAL ARTERY, PERCUTANEOUS APPROACH: ICD-10-PCS | Performed by: RADIOLOGY

## 2024-03-31 PROCEDURE — 85027 COMPLETE CBC AUTOMATED: CPT

## 2024-03-31 PROCEDURE — 85520 HEPARIN ASSAY: CPT | Performed by: RADIOLOGY

## 2024-03-31 PROCEDURE — 04CK3ZZ EXTIRPATION OF MATTER FROM RIGHT FEMORAL ARTERY, PERCUTANEOUS APPROACH: ICD-10-PCS | Performed by: RADIOLOGY

## 2024-03-31 PROCEDURE — 99233 SBSQ HOSP IP/OBS HIGH 50: CPT | Performed by: STUDENT IN AN ORGANIZED HEALTH CARE EDUCATION/TRAINING PROGRAM

## 2024-03-31 PROCEDURE — 250N000011 HC RX IP 250 OP 636: Performed by: STUDENT IN AN ORGANIZED HEALTH CARE EDUCATION/TRAINING PROGRAM

## 2024-03-31 PROCEDURE — 80048 BASIC METABOLIC PNL TOTAL CA: CPT | Performed by: NURSE PRACTITIONER

## 2024-03-31 PROCEDURE — 99233 SBSQ HOSP IP/OBS HIGH 50: CPT | Mod: 57 | Performed by: SURGERY

## 2024-03-31 PROCEDURE — 87040 BLOOD CULTURE FOR BACTERIA: CPT | Performed by: INTERNAL MEDICINE

## 2024-03-31 PROCEDURE — 258N000001 HC RX 258: Performed by: PHYSICIAN ASSISTANT

## 2024-03-31 PROCEDURE — P9012 CRYOPRECIPITATE EACH UNIT: HCPCS

## 2024-03-31 PROCEDURE — 250N000011 HC RX IP 250 OP 636

## 2024-03-31 PROCEDURE — 99291 CRITICAL CARE FIRST HOUR: CPT | Performed by: NURSE PRACTITIONER

## 2024-03-31 PROCEDURE — 80048 BASIC METABOLIC PNL TOTAL CA: CPT | Performed by: STUDENT IN AN ORGANIZED HEALTH CARE EDUCATION/TRAINING PROGRAM

## 2024-03-31 PROCEDURE — 76770 US EXAM ABDO BACK WALL COMP: CPT

## 2024-03-31 PROCEDURE — 35903 EXCISION GRAFT EXTREMITY: CPT | Mod: RT | Performed by: SURGERY

## 2024-03-31 PROCEDURE — 36415 COLL VENOUS BLD VENIPUNCTURE: CPT | Performed by: INTERNAL MEDICINE

## 2024-03-31 PROCEDURE — 82805 BLOOD GASES W/O2 SATURATION: CPT | Performed by: NURSE PRACTITIONER

## 2024-03-31 PROCEDURE — 82550 ASSAY OF CK (CPK): CPT

## 2024-03-31 PROCEDURE — 36600 WITHDRAWAL OF ARTERIAL BLOOD: CPT

## 2024-03-31 PROCEDURE — C1769 GUIDE WIRE: HCPCS

## 2024-03-31 PROCEDURE — 250N000013 HC RX MED GY IP 250 OP 250 PS 637: Performed by: PHYSICIAN ASSISTANT

## 2024-03-31 PROCEDURE — 258N000003 HC RX IP 258 OP 636: Performed by: NURSE PRACTITIONER

## 2024-03-31 PROCEDURE — 255N000002 HC RX 255 OP 636: Performed by: RADIOLOGY

## 2024-03-31 PROCEDURE — 272N000451 HC KIT SHRLOCK 5FR POWER PICC DOUBLE LUMEN

## 2024-03-31 PROCEDURE — 999N000157 HC STATISTIC RCP TIME EA 10 MIN

## 2024-03-31 PROCEDURE — 82550 ASSAY OF CK (CPK): CPT | Performed by: NURSE PRACTITIONER

## 2024-03-31 PROCEDURE — P9016 RBC LEUKOCYTES REDUCED: HCPCS

## 2024-03-31 PROCEDURE — 36569 INSJ PICC 5 YR+ W/O IMAGING: CPT

## 2024-03-31 PROCEDURE — 83605 ASSAY OF LACTIC ACID: CPT | Performed by: NURSE PRACTITIONER

## 2024-03-31 PROCEDURE — C1760 CLOSURE DEV, VASC: HCPCS

## 2024-03-31 PROCEDURE — 87040 BLOOD CULTURE FOR BACTERIA: CPT

## 2024-03-31 PROCEDURE — C1725 CATH, TRANSLUMIN NON-LASER: HCPCS

## 2024-03-31 PROCEDURE — 99152 MOD SED SAME PHYS/QHP 5/>YRS: CPT

## 2024-03-31 PROCEDURE — 200N000001 HC R&B ICU

## 2024-03-31 RX ORDER — NALOXONE HYDROCHLORIDE 0.4 MG/ML
0.2 INJECTION, SOLUTION INTRAMUSCULAR; INTRAVENOUS; SUBCUTANEOUS
Status: DISCONTINUED | OUTPATIENT
Start: 2024-03-31 | End: 2024-03-31 | Stop reason: HOSPADM

## 2024-03-31 RX ORDER — NALOXONE HYDROCHLORIDE 0.4 MG/ML
0.4 INJECTION, SOLUTION INTRAMUSCULAR; INTRAVENOUS; SUBCUTANEOUS
Status: DISCONTINUED | OUTPATIENT
Start: 2024-03-31 | End: 2024-03-31 | Stop reason: HOSPADM

## 2024-03-31 RX ORDER — CEFEPIME HYDROCHLORIDE 1 G/1
1 INJECTION, POWDER, FOR SOLUTION INTRAMUSCULAR; INTRAVENOUS EVERY 24 HOURS
Status: DISCONTINUED | OUTPATIENT
Start: 2024-04-01 | End: 2024-04-01

## 2024-03-31 RX ORDER — DIPHENHYDRAMINE HCL 50 MG
50 CAPSULE ORAL ONCE
Status: DISCONTINUED | OUTPATIENT
Start: 2024-04-01 | End: 2024-03-31 | Stop reason: HOSPADM

## 2024-03-31 RX ORDER — LIDOCAINE 40 MG/G
CREAM TOPICAL
Status: ACTIVE | OUTPATIENT
Start: 2024-03-31 | End: 2024-04-03

## 2024-03-31 RX ORDER — ONDANSETRON 2 MG/ML
4 INJECTION INTRAMUSCULAR; INTRAVENOUS
Status: DISCONTINUED | OUTPATIENT
Start: 2024-03-31 | End: 2024-03-31 | Stop reason: HOSPADM

## 2024-03-31 RX ORDER — VANCOMYCIN HYDROCHLORIDE 1 G/200ML
1000 INJECTION, SOLUTION INTRAVENOUS EVERY 12 HOURS
Status: DISCONTINUED | OUTPATIENT
Start: 2024-03-31 | End: 2024-03-31 | Stop reason: DRUGHIGH

## 2024-03-31 RX ORDER — CEFAZOLIN SODIUM 2 G/100ML
2 INJECTION, SOLUTION INTRAVENOUS EVERY 8 HOURS
Status: DISCONTINUED | OUTPATIENT
Start: 2024-03-31 | End: 2024-03-31

## 2024-03-31 RX ORDER — METHYLPREDNISOLONE SODIUM SUCCINATE 40 MG/ML
40 INJECTION, POWDER, LYOPHILIZED, FOR SOLUTION INTRAMUSCULAR; INTRAVENOUS ONCE
Qty: 1 ML | Refills: 0 | Status: COMPLETED | OUTPATIENT
Start: 2024-03-31 | End: 2024-03-31

## 2024-03-31 RX ORDER — SODIUM CHLORIDE 9 MG/ML
INJECTION, SOLUTION INTRAVENOUS CONTINUOUS
Status: DISCONTINUED | OUTPATIENT
Start: 2024-03-31 | End: 2024-03-31

## 2024-03-31 RX ORDER — METHYLPREDNISOLONE 16 MG/1
32 TABLET ORAL ONCE
Status: DISCONTINUED | OUTPATIENT
Start: 2024-03-31 | End: 2024-03-31

## 2024-03-31 RX ORDER — FLUMAZENIL 0.1 MG/ML
0.2 INJECTION, SOLUTION INTRAVENOUS
Status: DISCONTINUED | OUTPATIENT
Start: 2024-03-31 | End: 2024-03-31 | Stop reason: HOSPADM

## 2024-03-31 RX ORDER — DIPHENHYDRAMINE HYDROCHLORIDE 50 MG/ML
50 INJECTION INTRAMUSCULAR; INTRAVENOUS EVERY 6 HOURS PRN
Status: DISCONTINUED | OUTPATIENT
Start: 2024-03-31 | End: 2024-03-31

## 2024-03-31 RX ORDER — IOPAMIDOL 612 MG/ML
100 INJECTION, SOLUTION INTRAVASCULAR ONCE
Status: DISCONTINUED | OUTPATIENT
Start: 2024-03-31 | End: 2024-03-31 | Stop reason: HOSPADM

## 2024-03-31 RX ORDER — IODIXANOL 320 MG/ML
50 INJECTION, SOLUTION INTRAVASCULAR ONCE
Status: COMPLETED | OUTPATIENT
Start: 2024-03-31 | End: 2024-03-31

## 2024-03-31 RX ORDER — FENTANYL CITRATE 50 UG/ML
25-50 INJECTION, SOLUTION INTRAMUSCULAR; INTRAVENOUS EVERY 5 MIN PRN
Status: DISCONTINUED | OUTPATIENT
Start: 2024-03-31 | End: 2024-03-31 | Stop reason: HOSPADM

## 2024-03-31 RX ORDER — METHYLPREDNISOLONE 16 MG/1
32 TABLET ORAL ONCE
Status: DISCONTINUED | OUTPATIENT
Start: 2024-03-31 | End: 2024-03-31 | Stop reason: HOSPADM

## 2024-03-31 RX ORDER — CEFEPIME HYDROCHLORIDE 1 G/1
1 INJECTION, POWDER, FOR SOLUTION INTRAMUSCULAR; INTRAVENOUS EVERY 12 HOURS
Status: DISCONTINUED | OUTPATIENT
Start: 2024-03-31 | End: 2024-03-31

## 2024-03-31 RX ORDER — HEPARIN SODIUM 200 [USP'U]/100ML
1 INJECTION, SOLUTION INTRAVENOUS CONTINUOUS PRN
Status: DISCONTINUED | OUTPATIENT
Start: 2024-03-31 | End: 2024-03-31 | Stop reason: HOSPADM

## 2024-03-31 RX ADMIN — DEXTROSE MONOHYDRATE 25 ML: 25 INJECTION, SOLUTION INTRAVENOUS at 09:05

## 2024-03-31 RX ADMIN — AMLODIPINE BESYLATE 2.5 MG: 2.5 TABLET ORAL at 22:05

## 2024-03-31 RX ADMIN — NICOTINE 1 PATCH: 14 PATCH, EXTENDED RELEASE TRANSDERMAL at 09:10

## 2024-03-31 RX ADMIN — IODIXANOL 30 ML: 320 INJECTION, SOLUTION INTRAVASCULAR at 13:53

## 2024-03-31 RX ADMIN — BETAMETHASONE DIPROPIONATE: 0.5 CREAM TOPICAL at 22:16

## 2024-03-31 RX ADMIN — MIDAZOLAM 1 MG: 1 INJECTION INTRAMUSCULAR; INTRAVENOUS at 13:48

## 2024-03-31 RX ADMIN — LIDOCAINE HYDROCHLORIDE 2 ML: 10 INJECTION, SOLUTION EPIDURAL; INFILTRATION; INTRACAUDAL; PERINEURAL at 18:15

## 2024-03-31 RX ADMIN — FLUTICASONE FUROATE AND VILANTEROL TRIFENATATE 1 PUFF: 200; 25 POWDER RESPIRATORY (INHALATION) at 09:11

## 2024-03-31 RX ADMIN — DIPHENHYDRAMINE HYDROCHLORIDE 50 MG: 50 INJECTION, SOLUTION INTRAMUSCULAR; INTRAVENOUS at 13:37

## 2024-03-31 RX ADMIN — GABAPENTIN 100 MG: 100 CAPSULE ORAL at 09:11

## 2024-03-31 RX ADMIN — METHYLPREDNISOLONE SODIUM SUCCINATE 40 MG: 40 INJECTION INTRAMUSCULAR; INTRAVENOUS at 13:38

## 2024-03-31 RX ADMIN — FAMOTIDINE 20 MG: 10 INJECTION, SOLUTION INTRAVENOUS at 04:37

## 2024-03-31 RX ADMIN — DEXTROSE AND SODIUM CHLORIDE: 5; 450 INJECTION, SOLUTION INTRAVENOUS at 20:43

## 2024-03-31 RX ADMIN — LIDOCAINE HYDROCHLORIDE 10 ML: 10 INJECTION, SOLUTION INFILTRATION; PERINEURAL at 13:42

## 2024-03-31 RX ADMIN — HEPARIN SODIUM 2 BAG: 200 INJECTION, SOLUTION INTRAVENOUS at 13:43

## 2024-03-31 RX ADMIN — MIDAZOLAM 1 MG: 1 INJECTION INTRAMUSCULAR; INTRAVENOUS at 13:39

## 2024-03-31 RX ADMIN — GABAPENTIN 100 MG: 100 CAPSULE ORAL at 22:05

## 2024-03-31 RX ADMIN — ACETAMINOPHEN 975 MG: 325 TABLET, FILM COATED ORAL at 22:05

## 2024-03-31 RX ADMIN — CEFAZOLIN SODIUM 2 G: 2 INJECTION, SOLUTION INTRAVENOUS at 14:10

## 2024-03-31 RX ADMIN — VANCOMYCIN HYDROCHLORIDE 1000 MG: 1 INJECTION, SOLUTION INTRAVENOUS at 09:47

## 2024-03-31 RX ADMIN — BETAMETHASONE DIPROPIONATE: 0.5 CREAM TOPICAL at 09:12

## 2024-03-31 RX ADMIN — SODIUM CHLORIDE 1000 ML: 9 INJECTION, SOLUTION INTRAVENOUS at 21:40

## 2024-03-31 RX ADMIN — OMEPRAZOLE 20 MG: 20 CAPSULE, DELAYED RELEASE ORAL at 09:11

## 2024-03-31 RX ADMIN — CEFEPIME HYDROCHLORIDE 1 G: 1 INJECTION, POWDER, FOR SOLUTION INTRAMUSCULAR; INTRAVENOUS at 09:11

## 2024-03-31 ASSESSMENT — ACTIVITIES OF DAILY LIVING (ADL)
ADLS_ACUITY_SCORE: 30
ADLS_ACUITY_SCORE: 34
ADLS_ACUITY_SCORE: 30
ADLS_ACUITY_SCORE: 34
ADLS_ACUITY_SCORE: 36
ADLS_ACUITY_SCORE: 36
ADLS_ACUITY_SCORE: 32
ADLS_ACUITY_SCORE: 36
ADLS_ACUITY_SCORE: 36
ADLS_ACUITY_SCORE: 32
ADLS_ACUITY_SCORE: 36
ADLS_ACUITY_SCORE: 30
ADLS_ACUITY_SCORE: 32
ADLS_ACUITY_SCORE: 36
ADLS_ACUITY_SCORE: 32
ADLS_ACUITY_SCORE: 30
ADLS_ACUITY_SCORE: 36
ADLS_ACUITY_SCORE: 36

## 2024-03-31 NOTE — PROGRESS NOTES
Cross covering hospitalist note    I was notified of patient complaining of biting her tongue    I did go evaluate the patient.  She was awake alert and conversant.  Comfortable  I did note that she is on lisinopril 10 mg daily  However on exam she does not have any lip or tongue swelling  And no signs of tongue bite on the edges of her tongue without any swelling.  No alarming sign for angioedema  I noted that patient is going to receive cryoprecipitate transfusion.  Patient  may be given Benadryl and she has any itching or reaction during transfusion.  No further action at this moment.  Discussed with RN

## 2024-03-31 NOTE — PLAN OF CARE
Goal Outcome Evaluation:      Plan of Care Reviewed With: patient    Overall Patient Progress: no change  Outcome Evaluation: Patient alert and oriented. Temperature improved (97.5) overnight. BP also  improved after Blood transfusion. PCA for pain management. Pulses remain absent in right foot. She still unable to wiggle toes. Numbness remains unchanged to lower extremities. Transfused Cryo. 1 more unit of blood infusing now. She continues to have low urine output, MD aware. She had multiple loose/watery bloody stool, MD notified.      Problem: Adult Inpatient Plan of Care  Goal: Optimal Comfort and Wellbeing  Outcome: Progressing  Intervention: Monitor Pain and Promote Comfort  Recent Flowsheet Documentation  Taken 3/31/2024 0400 by Arlyn Escalante RN  Pain Management Interventions:   medication (see MAR)   pain pump in use  Taken 3/31/2024 0000 by Arlyn Escalante RN  Pain Management Interventions:   medication (see MAR)   pain pump in use  Taken 3/30/2024 2000 by Arlyn Escalante RN  Pain Management Interventions:   medication (see MAR)   pain pump in use  Intervention: Provide Person-Centered Care  Recent Flowsheet Documentation  Taken 3/31/2024 0400 by Arlyn Escalante RN  Trust Relationship/Rapport:   care explained   choices provided   empathic listening provided   emotional support provided   questions answered   questions encouraged   reassurance provided   thoughts/feelings acknowledged  Taken 3/31/2024 0000 by Arlyn Escalante RN  Trust Relationship/Rapport:   care explained   choices provided   empathic listening provided   emotional support provided   questions answered   questions encouraged   reassurance provided   thoughts/feelings acknowledged  Taken 3/30/2024 2000 by Arlyn Escalante RN  Trust Relationship/Rapport:   care explained   choices provided   empathic listening provided   emotional support provided   questions answered   questions encouraged   reassurance provided   thoughts/feelings  acknowledged     Problem: Pain Acute  Goal: Optimal Pain Control and Function  Outcome: Progressing  Intervention: Develop Pain Management Plan  Recent Flowsheet Documentation  Taken 3/31/2024 0400 by Arlyn Escalante RN  Pain Management Interventions:   medication (see MAR)   pain pump in use  Taken 3/31/2024 0000 by Arlyn Escalante RN  Pain Management Interventions:   medication (see MAR)   pain pump in use  Taken 3/30/2024 2000 by Arlyn Escalante RN  Pain Management Interventions:   medication (see MAR)   pain pump in use  Intervention: Prevent or Manage Pain  Recent Flowsheet Documentation  Taken 3/31/2024 0400 by Arlyn Escalante RN  Medication Review/Management:   medications reviewed   high-risk medications identified  Taken 3/31/2024 0000 by Arlyn Escalante RN  Medication Review/Management:   medications reviewed   high-risk medications identified  Taken 3/30/2024 2000 by Arlyn Escalante RN  Medication Review/Management:   medications reviewed   high-risk medications identified

## 2024-03-31 NOTE — IR NOTE
Interventional Radiology Intra-procedural Nursing Note    Patient Name: Shirley Hendricks  Medical Record Number: [id number]  Today's Date: March 31, 2024    Procedure: Lytics follow up with IV moderate sedation  Start time: 1335  End time: 1351  Report provided to: MARISSA Interiano  Patient depart time and location: 1412 to ICU Room 364    Note: Patient entered Interventional Radiology Suite number 2 via cart. Patient awake, alert and oriented. Assisted onto procedural table in supine position. Prepped and draped.  Dr. Salomon in room. Time out and procedure started. Vital signs stable. Telemetry reading NSR.    Procedure well tolerated by patient without complications. Procedure end with debrief by Dr. Salomon.  8 Fr angioseal deployed following sheath removal; manual pressure applied until hemostasis achieved at 1355. Quick clot and tegaderm dressing applied to left interventional procedure access site, dressing is c/d/I.    4 hours bedrest per Dr. Salomon, until 1800 tonight.      Administered medication totals:  Lidocaine 1% 10 mL Intradermal  Versed 2 mg IVP  Benadryl 50 mg IVP  Sol-medrol 40 mg IVP      Last dose of sedation administered at 1348.

## 2024-03-31 NOTE — PLAN OF CARE
"  Problem: Adult Inpatient Plan of Care  Goal: Plan of Care Review  Description: The Plan of Care Review/Shift note should be completed every shift.  The Outcome Evaluation is a brief statement about your assessment that the patient is improving, declining, or no change.  This information will be displayed automatically on your shift  note.  Outcome: Not Progressing  Flowsheets (Taken 3/30/2024 2032)  Outcome Evaluation: Pt down to IR for cath change and stent placement and angiogram. Oliguric today, MD notified this morning, will monitor. Pt very lethargic on return from IR but arrousable. Became hypotensive. Pt given 2000mL bolus total and 1 unit PRBCs. When pt more awake she told this nurse she was\" numb from the waist down and I can't move my legs\". On assessment pt not moving RLE and able to shift LLE some. MD notified and pt sent for CT of lumbar spine. Results posted and MD's notified.  Plan of Care Reviewed With:   patient   family  Goal: Patient-Specific Goal (Individualized)  Description: You can add care plan individualizations to a care plan. Examples of Individualization might be:  \"Parent requests to be called daily at 9am for status\", \"I have a hard time hearing out of my right ear\", or \"Do not touch me to wake me up as it startles  me\".  Outcome: Not Progressing  Goal: Absence of Hospital-Acquired Illness or Injury  Outcome: Not Progressing  Intervention: Identify and Manage Fall Risk  Recent Flowsheet Documentation  Taken 3/30/2024 1600 by Becky Beckwith, RN  Safety Promotion/Fall Prevention:   activity supervised   increased rounding and observation   safety round/check completed  Taken 3/30/2024 1000 by Becky Beckwith, RN  Safety Promotion/Fall Prevention:   activity supervised   increased rounding and observation   safety round/check completed  Taken 3/30/2024 0800 by Becky Beckwith, RN  Safety Promotion/Fall Prevention:   activity supervised   increased rounding and observation   " safety round/check completed  Intervention: Prevent Skin Injury  Recent Flowsheet Documentation  Taken 3/30/2024 1600 by Becky Beckwith RN  Body Position:   right   turned  Taken 3/30/2024 1000 by Becky Beckwith RN  Body Position:   left   weight shifting  Taken 3/30/2024 0800 by Becky Beckwith RN  Body Position:   weight shifting   right  Intervention: Prevent and Manage VTE (Venous Thromboembolism) Risk  Recent Flowsheet Documentation  Taken 3/30/2024 1600 by Becky Beckwith RN  VTE Prevention/Management: SCDs (sequential compression devices) off  Taken 3/30/2024 0800 by Becky Beckwith RN  VTE Prevention/Management: SCDs (sequential compression devices) off  Intervention: Prevent Infection  Recent Flowsheet Documentation  Taken 3/30/2024 1600 by Becky Beckwith RN  Infection Prevention:   environmental surveillance performed   equipment surfaces disinfected   hand hygiene promoted   rest/sleep promoted   single patient room provided  Taken 3/30/2024 1000 by Becky Beckwith RN  Infection Prevention:   environmental surveillance performed   equipment surfaces disinfected   hand hygiene promoted   rest/sleep promoted   single patient room provided  Taken 3/30/2024 0800 by Becky Beckwith RN  Infection Prevention:   environmental surveillance performed   equipment surfaces disinfected   hand hygiene promoted   rest/sleep promoted   single patient room provided  Goal: Optimal Comfort and Wellbeing  Outcome: Not Progressing  Intervention: Monitor Pain and Promote Comfort  Recent Flowsheet Documentation  Taken 3/30/2024 0800 by Becky Beckwith RN  Pain Management Interventions:   medication (see MAR)   pain pump in use  Intervention: Provide Person-Centered Care  Recent Flowsheet Documentation  Taken 3/30/2024 1600 by Becky Beckwith RN  Trust Relationship/Rapport:   care explained   choices provided   emotional support provided   empathic listening provided   questions  answered   questions encouraged   reassurance provided  Taken 3/30/2024 0800 by Becky Beckwith RN  Trust Relationship/Rapport:   care explained   choices provided   emotional support provided   empathic listening provided   questions answered   questions encouraged   reassurance provided  Goal: Readiness for Transition of Care  Outcome: Not Progressing  Intervention: Mutually Develop Transition Plan  Recent Flowsheet Documentation  Taken 3/30/2024 0800 by Becky Beckwith RN  Equipment Currently Used at Home: none     Problem: Pain Acute  Goal: Optimal Pain Control and Function  Outcome: Not Progressing  Intervention: Develop Pain Management Plan  Recent Flowsheet Documentation  Taken 3/30/2024 0800 by Becky Beckwith RN  Pain Management Interventions:   medication (see MAR)   pain pump in use  Intervention: Prevent or Manage Pain  Recent Flowsheet Documentation  Taken 3/30/2024 1600 by Becky Beckwith RN  Medication Review/Management: medications reviewed  Taken 3/30/2024 1000 by Becky Beckwith RN  Medication Review/Management: medications reviewed  Taken 3/30/2024 0800 by Becky Beckwith RN  Medication Review/Management: medications reviewed     Problem: Thrombolytic Therapy  Goal: Absence of Bleeding  Outcome: Not Progressing  Intervention: Prevent and Manage Risk of Hemorrhage  Recent Flowsheet Documentation  Taken 3/30/2024 0800 by Becky Beckwith RN  Bleeding Precautions:   blood pressure closely monitored   coagulation study results reviewed   monitored for signs of bleeding  Bleeding Management:   direct pressure applied   dressing monitored   Goal Outcome Evaluation:      Plan of Care Reviewed With: patient, family          Outcome Evaluation: Pt down to IR for cath change and stent placement and angiogram. Oliguric today, MD notified this morning, will monitor. Pt very lethargic on return from IR but arrousable. Became hypotensive. Pt given 2000mL bolus total and 1 unit PRBCs.  "When pt more awake she told this nurse she was\" numb from the waist down and I can't move my legs\". On assessment pt not moving RLE and able to shift LLE some. MD notified and pt sent for CT of lumbar spine. Results posted and MD's notified.      "

## 2024-03-31 NOTE — PROGRESS NOTES
VASCULAR SURGERY    Angiogram reviewed.  Graft did have a pulse this morning when I saw patient but no Doppler flow in anterior tibial/DP which is only runoff vessels.  Very ischemic foot with no motor or sensation.      Unable to establish any runoff with lytic therapy.  Very poor collaterals in the groin  Her limb is nonsalvageable.  Unfortunately she will require an above-the-knee amputation    Chadwick Lozano MD

## 2024-03-31 NOTE — PHARMACY-VANCOMYCIN DOSING SERVICE
Pharmacy Empiric Dose Change Per Policy    Original Dose Ordered: Vancomycin 1000 mg IV q12h  Dose Changed To: intermittent Vancomycin dosing based on levels    This dose change was based on the pharmacist's assessment of this patient's age, weight, concurrent drug therapy, treatment goals, whether patient's creatinine clearance adequately indicates renal function (factoring in age, muscle mass, fluid and clinical status), and, if applicable, prior pharmacokinetic data.    Estimated Creatinine Clearance: 28.7 mL/min (A) (based on SCr of 1.61 mg/dL (H)).    Hanny Murray, PharmD, BCPS

## 2024-03-31 NOTE — PLAN OF CARE
Problem: Adult Inpatient Plan of Care  Goal: Absence of Hospital-Acquired Illness or Injury  Intervention: Identify and Manage Fall Risk  Recent Flowsheet Documentation  Taken 3/31/2024 1600 by Laisha Trujillo RN  Safety Promotion/Fall Prevention:   activity supervised   increased rounding and observation   safety round/check completed  Taken 3/31/2024 1200 by Laisha Trujillo RN  Safety Promotion/Fall Prevention:   activity supervised   increased rounding and observation   safety round/check completed  Taken 3/31/2024 0800 by Laisha Trujillo RN  Safety Promotion/Fall Prevention:   activity supervised   increased rounding and observation   safety round/check completed  Intervention: Prevent Skin Injury  Recent Flowsheet Documentation  Taken 3/31/2024 1600 by Laisha Trujillo RN  Body Position:   turned   side-lying  Device Skin Pressure Protection:   absorbent pad utilized/changed   adhesive use limited  Taken 3/31/2024 1200 by Laisha Trujillo RN  Body Position:   turned   side-lying  Device Skin Pressure Protection:   absorbent pad utilized/changed   adhesive use limited  Taken 3/31/2024 1000 by Laisha Trujillo RN  Body Position:   turned   side-lying  Taken 3/31/2024 0800 by Laisha Trujillo RN  Body Position:   turned   side-lying  Device Skin Pressure Protection:   absorbent pad utilized/changed   adhesive use limited  Intervention: Prevent and Manage VTE (Venous Thromboembolism) Risk  Recent Flowsheet Documentation  Taken 3/31/2024 1600 by Laisha Trujillo RN  VTE Prevention/Management: SCDs (sequential compression devices) off  Taken 3/31/2024 1200 by Laisha Trujillo RN  VTE Prevention/Management: SCDs (sequential compression devices) off  Taken 3/31/2024 0800 by Laisha Trujillo RN  VTE Prevention/Management: SCDs (sequential compression devices) off  Intervention: Prevent Infection  Recent Flowsheet Documentation  Taken 3/31/2024 1600 by Laisha Trujillo RN  Infection Prevention:   environmental  surveillance performed   equipment surfaces disinfected   hand hygiene promoted   rest/sleep promoted   single patient room provided  Taken 3/31/2024 1200 by Laisha Trujillo RN  Infection Prevention:   environmental surveillance performed   equipment surfaces disinfected   hand hygiene promoted   rest/sleep promoted   single patient room provided  Taken 3/31/2024 0800 by Laisha Trujillo RN  Infection Prevention:   environmental surveillance performed   equipment surfaces disinfected   hand hygiene promoted   rest/sleep promoted   single patient room provided  Goal: Optimal Comfort and Wellbeing  Intervention: Monitor Pain and Promote Comfort  Recent Flowsheet Documentation  Taken 3/31/2024 1200 by Laisha Trujillo, RN  Pain Management Interventions: pain pump in use  Taken 3/31/2024 1000 by Laisha Trujillo RN  Pain Management Interventions: pain pump in use  Taken 3/31/2024 0800 by Laisha Trujillo RN  Pain Management Interventions: pain pump in use  Intervention: Provide Person-Centered Care  Recent Flowsheet Documentation  Taken 3/31/2024 1600 by Laisha Trujillo RN  Trust Relationship/Rapport:   care explained   choices provided   empathic listening provided   emotional support provided   questions answered   questions encouraged   reassurance provided   thoughts/feelings acknowledged  Taken 3/31/2024 1200 by Laisha Trujillo RN  Trust Relationship/Rapport:   care explained   choices provided   empathic listening provided   emotional support provided   questions answered   questions encouraged   reassurance provided   thoughts/feelings acknowledged  Taken 3/31/2024 0800 by Laisha Trujillo RN  Trust Relationship/Rapport:   care explained   choices provided   empathic listening provided   emotional support provided   questions answered   questions encouraged   reassurance provided   thoughts/feelings acknowledged  Goal: Readiness for Transition of Care  Outcome: Progressing   Goal Outcome Evaluation:    Went to  IR for sheath removal at 1300. Somnolent and lethargic since back to ICU, but arouses to touch. RLE no movement to extremity, LLE very weak. Baseline neuropathy. SR, MAP >65. On 8L oxymask. Reg diet. Alvarez with low/no UO - MD aware and ordered renal US. PICC being placed at bedside now due to inability to obtain blood draws. PIV infusing D5 1/2 NS and dilaudid PCA. BG controlled since fluids changed to include dextrose. Bilateral groin sites CDI. BR off since 1800. Tx orders to [facility]. Plan for BKA when medically stable. Family updated on plan of care.

## 2024-03-31 NOTE — PHARMACY-VANCOMYCIN DOSING SERVICE
Pharmacy Vancomycin Initial Note  Date of Service 2024  Patient's  1960  63 year old, female    Indication: Skin and Soft Tissue Infection    Current estimated CrCl = Estimated Creatinine Clearance: 94.2 mL/min (A) (based on SCr of 0.45 mg/dL (L)).    Creatinine for last 3 days  3/29/2024:  4:56 PM Creatinine 0.67 mg/dL  3/30/2024:  5:00 AM Creatinine 0.45 mg/dL    Recent Vancomycin Level(s) for last 3 days  No results found for requested labs within last 3 days.      Vancomycin IV Administrations (past 72 hours)        No vancomycin orders with administrations in past 72 hours.                    Nephrotoxins and other renal medications (From now, onward)      Start     Dose/Rate Route Frequency Ordered Stop    24 0300  vancomycin (VANCOCIN) 1,000 mg in 200 mL dextrose intermittent infusion         1,000 mg  200 mL/hr over 1 Hours Intravenous EVERY 12 HOURS 24 0217      24 0900  empagliflozin (JARDIANCE) tablet 10 mg        Note to Pharmacy: PTA Sig:Take 1 tablet (10 mg) by mouth daily      10 mg Oral DAILY 24 1840      24 0900  lisinopril (ZESTRIL) tablet 10 mg        Note to Pharmacy: PTA Sig:Take 1 tablet (10 mg) by mouth daily      10 mg Oral DAILY 24 1840              Contrast Orders - past 72 hours (72h ago, onward)      Start     Dose/Rate Route Frequency Stop    24 1200  iodixanol (VISIPAQUE 320) injection 100 mL         100 mL Arterial ONCE 24 1435    24 2100  iodixanol (VISIPAQUE 320) injection 100 mL         100 mL INTRA-ARTERIAL ONCE 24 2241            [employer] Prediction of Planned Initial Vancomycin Regimen  Loading dose: N/A  Regimen: 1000 mg IV every 12 hours.  Start time: 02:15 on 2024  Exposure target: AUC24 (range)400-600 mg/L.hr   AUC24,ss: 533 mg/L.hr  Probability of AUC24 > 400: 78 %  Ctrough,ss: 15.1 mg/L  Probability of Ctrough,ss > 20: 29 %  Probability of nephrotoxicity (Lodise BRITTANY ): 10 %           Plan:  Start vancomycin 1000 mg IV q12h.   Vancomycin monitoring method: AUC  Vancomycin therapeutic monitoring goal: 400-600 mg*h/L  Pharmacy will check vancomycin levels as appropriate in 1-3 Days.    Serum creatinine levels will be ordered daily for the first week of therapy and at least twice weekly for subsequent weeks.      Jojo Horton RPH

## 2024-03-31 NOTE — PROGRESS NOTES
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  There was no improvement in the signals in the right foot.  After much deliberation we decided to stop the catheter directed thrombolysis and pulled the catheter which was done successfully.  I discussed this with Dr. Salomon as well.  We continued with the heparin through the sheath in the groin.    Her CPK has been slowly rising.  Her serum creatinine and body urea nitrogen have also taken a jump.    Because of the hypofibrinogenemia the last time we gave her cryoprecipitate.    Patient is quite visibly distressed by this situation and I explained that catheter directed thrombolysis is really taking a huge toll and we are running into his serious side effects including acute blood loss anemia.    I do not appreciate any signals in the foot.  Right foot is cool.  Still has sensory loss and is not able to wiggle toes but is able to plantarflex and dorsiflex.    We will take the patient to the angio suite to remove the sheath in the left groin with closure device.    I want to make sure her fibrinogen levels and hemoglobin are relatively stabilized before we take her for removal of the left groin sheath.  I will discuss this again in the morning with Dr. Salomon.

## 2024-03-31 NOTE — PROGRESS NOTES
[facility]    Medicine Progress Note - Hospitalist Service    Date of Admission:  3/29/2024  Date of Service: 03/31/2024    Assessment & Plan   Shirley Hendricks is a 63 year old female admitted on 3/29/2024 due to occlusion of right LE bypass graft.      Past medical history significant for PAD/PVD, Tobacco use D/O, CAD (history of MI x3), HTN, HLP, DM2, Neuropathy, COPD, GERD, Anemia, Spongiotic dermatitis, MDD with anxiety, Chronic anticoagulation therapy with history of DVT/PE, OA, Charcot-Breonna-Tooth foot deformity, Marginal zone B-cell lymphoma, History of SBO, History of Takotsubo CM, History of SVT.    Discussed with Dr. Mazariegos and reviewed ED notes and Vascular Surgery consult note.  Patient presented to the ED due to right leg pain that had begun ~ 1 hour prior to presentation.  She reported pain seems to worsen with walking and when she attempted to check a pulse in her leg it was absent.  She also described that the foot is colder than the left.      While in the lobby attempts to find right pedal pulse with doppler were unsuccessful.  Dr. Lozano of Vascular Surgery was contacted by the patient as well as Dr. Mazariegos.      Work-up completed while patient was in the lobby included right arterial LE US revealed the right common femoral artery to mid anterior tibial artery bypass graft occlusion.      After assessing patient labs were collected and included a CMP that revealed a sodium of 132, chloride of 97, CO2 of 21 and glucose of 103 otherwise within normal limits.  CBC with differential revealed an RDW of 15.7 otherwise unremarkable.    Right common femoral artery to mid anterior tibial artery bypass graft acute occlusion  PAD/PVD  Acute Blood Loss Anemia  *Occurred despite low-dose Xarelto and Plavix therapy.    - Vascular surgery consult requested.     --Close monitoring in the ICU.    - IR consulted and following  --Per Vascular surgery consult Dr. Salomon was  contacted and s/p right LE angiogram and lytic therapy 03/30 -> today will take the patient to the angio suite to remove the sheath in the left groin with closure device.   - Hold PTA Xarelto 15 mg nightly.    - Defer resumption of PTA Plavix 75 mg/d to Vascular Surgery and/or IR.    - Statin and antihypertensive management as below.    - Pain management:               --Schedule APAP 975 mg every 8 hours.                 --PRN oxycodone 2.5-5 mg every 4 hours based of pain severity.               --PRN IV dilaudid 0.2-0.4 mg every 2 hours based off severity.    --Resumed on PTA gabapentin as below.    - Follow Hgb   - Cannot perform CTA of abdomen at this time and since hemodynamically stable ->  angiogram will see if there is any active extravasation.   - transfuse 1 U PRBCs 03/30 and closely monitor.    Rhabdomyolysis   LIMA  Assessment: CPK has been slowly rising. Her serum creatinine and body urea nitrogen have also taken a jump.   Plan:  - IVF today  - Follow CK / BMP    Contrast dye allergy  - Ordered solumedrol and benadryl per contrast dye allergy protocol.      Mild hyponatremia  - BMP ordered for AM of 3/30.      Tobacco Use D/O   Patient currently smokes max 5 cigarettes per day.     - Counseled regarding smoking cessation that should also continue with PCP.    - Nicoderm patch ordered.    Recent celiac disease  *Patient reported recent GI work-up revealed she has celiac disease.    - Once diet can be advanced would request no gluten/celiac diet.      CAD (history of MI x3)  HTN  HLP  *Last coronary angiogram completed 10/2023.    - Resumed on PTA Coreg 6.25 mg BID, hold lisinopril 10 mg/d due to LIMA, continue Norvasc 2.5 mg/d.  Hold parameters in place.    - hold PTA rosuvastatin 40 mg/d due to acute rhabdo  - PRN IV hydralazine 10 mg every 4 hours for SBP GREATER THAN 180.    - Monitor on telemetry.      History of Takotsubo CM  *Recovered EF (55-60%) from ECHO 11/2023.    - Resumed on PTA Coreg 6.25 mg  BID, lisinopril 10 mg/d and Jardiance 10 mg/d.      Type 2 DM  Diabetic neuropathy  *Noted diagnosis on chart review.  However, A1c of 5.2%.    - Resumed on PTA Jardiance 10 mg/d and gabapentin 100 mg TID.      COPD   - Resumed on PTA inhalers.    - Encourage use of Flutter valve/IS.      GERD  - Resumed on PTA omeprazole 20 mg/d.      Anemia  Recent GI bleed (12/2023 and admitted at [facility])  - Hold PTA Iron supplements and resume at discharge.    - CBC daily      Spongiotic dermatitis  *Recently seen at outpatient Derm.    - Resumed on PTA betamethasone cream BID.      MDD with anxiety  *Noted on chart review.  No interventions.      Chronic anticoagulation therapy with history of DVT/PE  - Hold PTA Xarelto.      OA  - Pain management as above.      Charcot-Breonna-Tooth foot deformity  *Noted on chart review.  No interventions.     Stage VALENTIN marginal zone B-cell lymphoma  *Follows with [facility] (Dr. Barrow).    - Continue outpatient surveillance (CT scan in 8/2024).      History of SBO  *Noted on chart review.  No interventions.    History of SVT  - Monitor on telemetry.    - Resumed on PTA Coreg as above.            Diet: NPO for Medical/Clinical Reasons Except for: Meds    DVT Prophylaxis: Pneumatic Compression Devices  Alvarez Catheter: PRESENT, indication: Acute retention or obstruction  Lines: PRESENT             Cardiac Monitoring: ACTIVE order. Indication: ICU  Code Status: Full Code      Clinically Significant Risk Factors             # Anion Gap Metabolic Acidosis: Highest Anion Gap = 19 mmol/L in last 2 days, will monitor and treat as appropriate     # Acute Kidney Injury, unspecified: based on a >150% or 0.3 mg/dL increase in last creatinine compared to past 90 day average, will monitor renal function  # Hypertension: Noted on problem list            # Financial/Environmental Concerns:           Disposition Plan      Expected Discharge Date: 04/05/2024,  3:00 PM                Marcin White  MD  Hospitalist Service  [facility]  Securely message with [employer] (more info)  Text page via [employer] Paging/Directory   ______________________________________________________________________    Interval History     No acute events overnight  No fevers  No new CP/SOB  No new complaints  Unable to wiggle toes  No LE pain  No new complaints    Physical Exam   Vital Signs: Temp: 99.3  F (37.4  C) Temp src: Bladder BP: 110/65 Pulse: 87   Resp: 25 SpO2: 91 % O2 Device: Nasal cannula Oxygen Delivery: 5 LPM  Weight: 115 lbs 1.6 oz      Constitutional: Awake, alert, cooperative, no apparent distress.    Pulmonary: CTABL  Cardiovascular: Regular rate and rhythm, normal S1 and S2, no S3 or S4, and no murmur noted.  GI: Normal bowel sounds, soft, non-distended, non-tender.    Skin/Integumen: Visualized skin appeared clear.  Neuro: Fahad but has sensory loss and is not able to wiggle toes but is able to plantarflex and dorsiflex.   Psych:  Alert and oriented x 3. Normal affect.  Extremities: No lower extremity edema noted, and calves are non-tender to palpation bilaterally.  Right foot is cool to touch.   ----------------------------------------------------------------------------------------    Medical Decision Making       ------------------ MEDICAL DECISION MAKING ------------------------------------------------------------------------------------------------------  High Complexity Decision Making       Data   ------------------------- PAST 24 HR DATA REVIEWED -----------------------------------------------    I have personally reviewed the following data over the past 24 hrs:    10.7  \   8.5 (L)   / 131 (L)     139 109 (H) 42.5 (H) /  124 (H)   5.3 11 (L) 1.61 (H) \     INR:  N/A PTT:  N/A   D-dimer:  N/A Fibrinogen:  184       Imaging results reviewed over the past 24 hrs:   Recent Results (from the past 24 hour(s))   CT Lumbar Spine w/o Contrast    Narrative    EXAM: CT LUMBAR SPINE W/O  CONTRAST  LOCATION: [facility]  DATE: 3/30/2024    INDICATION: Severe LE weakness  COMPARISON: None.  TECHNIQUE: Routine CT Lumbar Spine without IV contrast. Multiplanar reformats. Dose reduction techniques were used.     FINDINGS:  VERTEBRA: Normal vertebral body heights and alignment. No fracture or posttraumatic subluxation.     CANAL/FORAMINA: No canal or neural foraminal stenosis.    PARASPINAL: Atrophic left kidney. Retained contrast within the right kidney is concerning for persistent or delayed nephrogram, and can be seen in the setting of obstructive uropathy, renal vein thrombosis or renal artery stenosis. Extensive   atherosclerotic vascular disease of the aorta and vascular stents are noted. There is soft tissue stranding in the right lower quadrant which includes a hyperattenuating right lower quadrant fluid collection which is incompletely imaged and concerning   for retroperitoneal hematoma.       Impression    IMPRESSION:  1.  No fracture or posttraumatic subluxation.  2.  No high-grade spinal canal or neural foraminal stenosis.  3.  Incompletely imaged probable retroperitoneal hematoma on the right.  4.  Persistent enhancement of the right kidney concerning for delayed nephrogram which is seen in the setting of obstructive uropathy, renal vein thrombosis, or renal artery stenosis.      These findings were discussed with  at 7:18 PM CST on 03/30/2024.     ------------------------- ENCOUNTER LABS ----------------------------------------------------------------  Recent Labs   Lab 03/31/24  1136 03/31/24  0947 03/31/24  0859 03/31/24  0530 03/31/24  0051 03/30/24  1609 03/30/24  1207 03/30/24  0923 03/30/24  0500 03/29/24  1849 03/29/24  1656   WBC  --   --   --   --  10.7  --  6.5  --  5.2  --  6.0   HGB  --   --   --   --  8.5*  --  8.2*  --  8.1*  --  11.8   MCV  --   --   --   --  87  --  88  --  87  --  86   PLT  --   --   --   --  131*  --  171  --  133*  --   195   NA  --   --   --  139  --   --   --   --   --   --  132*   POTASSIUM  --   --   --  5.3  --   --   --   --   --   --  4.2   CHLORIDE  --   --   --  109*  --   --   --   --   --   --  97*   CO2  --   --   --  11*  --   --   --   --   --   --  21*   BUN  --   --   --  42.5*  --   --   --   --   --   --  12.3   CR  --   --   --  1.61*  --   --   --   --  0.45*  --  0.67   ANIONGAP  --   --   --  19*  --   --   --   --   --   --  14   SONIA  --   --   --  6.0*  --   --   --   --   --   --  9.0   * 152* 66* 84  --    < >  --    < >  --    < > 103*    < > = values in this interval not displayed.       Most Recent 3 CBC's:  Recent Labs   Lab Test 03/31/24  0051 03/30/24  1207 03/30/24  0500   WBC 10.7 6.5 5.2   HGB 8.5* 8.2* 8.1*   MCV 87 88 87   * 171 133*     Most Recent 3 BMP's:  Recent Labs   Lab Test 03/31/24  1136 03/31/24  0947 03/31/24  0859 03/31/24  0530 03/30/24  0923 03/30/24  0500 03/29/24  1849 03/29/24  1656 01/08/24  1542   NA  --   --   --  139  --   --   --  132* 135   POTASSIUM  --   --   --  5.3  --   --   --  4.2 5.0   CHLORIDE  --   --   --  109*  --   --   --  97* 101   CO2  --   --   --  11*  --   --   --  21* 23   BUN  --   --   --  42.5*  --   --   --  12.3 20.5   CR  --   --   --  1.61*  --  0.45*  --  0.67 0.76   ANIONGAP  --   --   --  19*  --   --   --  14 11   SONIA  --   --   --  6.0*  --   --   --  9.0 9.0   * 152* 66* 84   < >  --    < > 103* 88    < > = values in this interval not displayed.     Most Recent 2 LFT's:  Recent Labs   Lab Test 10/07/23  1950 10/03/23  0737   AST 18 25   ALT 15 22   ALKPHOS 35 82   BILITOTAL 0.2 0.9     Most Recent 3 INR's:  Recent Labs   Lab Test 10/07/23  0516 10/06/23  0551 10/05/23  1014   INR 1.38* 1.08 0.95     Most Recent 3 Troponin's:  Recent Labs   Lab Test 06/10/20  1243 02/16/20  1824 09/18/19  0739   TROPI <0.015 <0.015 <0.015     Most Recent 3 BNP's:No lab results found.  Most Recent D-dimer:  Recent Labs   Lab Test  08/13/21  0934   DD 10.38*     Most Recent Cholesterol Panel:  Recent Labs   Lab Test 10/03/23  0941   CHOL 137   LDL 69   HDL 54   TRIG 68     Most Recent 6 Bacteria Isolates From Any Culture (See EPIC Reports for Culture Details):No lab results found.  Most Recent TSH and T4:  Recent Labs   Lab Test 01/05/21  1119 02/04/19  0934 03/28/17  0922   TSH 0.77   < > 1.37   T4  --   --  1.19    < > = values in this interval not displayed.     Most Recent Urinalysis:  Recent Labs   Lab Test 02/04/19  0935   COLOR Yellow   APPEARANCE Clear   URINEGLC Negative   URINEBILI Negative   URINEKETONE Negative   SG 1.010   UBLD Negative   URINEPH 6.0   PROTEIN Negative   UROBILINOGEN 0.2   NITRITE Negative   LEUKEST Negative   RBCU O - 2   WBCU 0 - 5     Most Recent ESR & CRP:  Recent Labs   Lab Test 11/13/23  1705   SED 39*

## 2024-03-31 NOTE — PROCEDURES
[facility]    Procedure: Right lower extremity aniogram, lytics follow up.    Date/Time: 3/31/2024 1:57 PM    Performed by: Leonor Salomon DO  Authorized by: Leonor Salomon DO      UNIVERSAL PROTOCOL   Site Marked: NA  Prior Images Obtained and Reviewed:  Yes  Required items: Required blood products, implants, devices and special equipment available    Patient identity confirmed:  Verbally with patient, arm band, provided demographic data and hospital-assigned identification number  Patient was reevaluated immediately before administering moderate or deep sedation or anesthesia  Confirmation Checklist:  Patient's identity using two indicators, relevant allergies, procedure was appropriate and matched the consent or emergent situation and correct equipment/implants were available  Time out: Immediately prior to the procedure a time out was called    Universal Protocol: the Joint Commission Universal Protocol was followed    Preparation: Patient was prepped and draped in usual sterile fashion       ANESTHESIA    Anesthesia:  Local infiltration  Local Anesthetic:  Lidocaine 1% without epinephrine      SEDATION  Patient Sedated: Yes    Sedation Type:  Moderate (conscious) sedation  Vital signs: Vital signs monitored during sedation    See dictated procedure note for full details.  Findings: Right lower extremity angiogram shows the proximal bypass is patient, however minimal flow is noted.      No active extravasation noted in the right groin.     Placement of left 8 American angioseal closure device. Her left bypass has bounding pulses post placement of the angioseal closure device. (The groin was prepped multiple times and antibiotics were given prior to placement of the closure device).     Specimens: none    Complications: None    Condition: Stable    Plan: Bed rest with the left leg straight for 4 hours.       PROCEDURE    Patient Tolerance:  Patient tolerated the  procedure well with no immediate complications  Length of time physician/provider present for 1:1 monitoring during sedation: 15

## 2024-03-31 NOTE — PROVIDER NOTIFICATION
MD Notification    Notified Person: MD    Notified Person Name: [facility] HELADIO Mina    Notification Date/Time: 3/30/24 7:54pm    Notification Interaction: Vocera messaging    Purpose of Notification: Critical lab: Fibrinogen 75. Soft BPs with MAP less than 65. She is receiving blood, had Bolus fluids on previous shift. On Altiplase and Heparin drips. See CT lumbar spine results. We have a page out to Vascular surgery. Very Low urine output < 20ml/2hrs    Orders Received:    Comments: To Stop Alteplase until next Fibrinogen result([id number] check). If Fibrinogen greater than 200; Update Vascular surgery with results before restarting Alteplase.

## 2024-03-31 NOTE — PROGRESS NOTES
Vascular Surgery note    Paged regarding fibrinogen of 91 and patient now with loose red bowel movements .    Blood pressures in 110 systolic heart rate 90s     CK 1900s  Hemoglobin stable at 8.5 from 8.3    Patient will require higher fibrinogen  levels in order to have sheath removed. Will order cryo stat, G.I. bleeding anticipate hemoglobin will drop, Ordered one unit PRC preemptively.     CK elevated, evidence of ischemic leg. Given amount of prosthetic graft inpatient., will start broad spectrum antibiotics to decrease risk of infection. Allergy to penicillins. Cefelime   vancomycin ordered.  Also will add famotidine IV given GI bleed and patient's allergy of diffuse edema to Protonix.    Please continue IV fluids.     Will continue to monitor closely.     Discussed with Dr. Mary Lo MD

## 2024-03-31 NOTE — PROGRESS NOTES
Vascular Surgery Progress Note    - hypothermic per bladder temp ~94-95 since 3pm. Oral probes not reading, temporal temp 96. Hypotensive to 80s/50s with improvement with bolus  and prBCs earlier in the day.     Critical fibrinogen-of 75    Given above, decision made to stop lytics completely due to high risk of bleeding complication and in setting of coagulopaty.  Infusion catheter removed. 8Fr sheath remains, patient needs to remain flat.    Cocnern hypothermia and hypotension may be due to systemic response to ischemic leg.     We will plan to stop lytics at this time.  Discussed with both patient and her daughter that if her clinical picture does not improve, she may require above knee amputation as the ischemia is likely the source of her septic appearing picture. They expressed understanding of this.      - Stop lytic  - Continue heparin 500u/hr  - Remain flat  - Next lab check @0000  - Continue NPO

## 2024-04-01 ENCOUNTER — ANESTHESIA (OUTPATIENT)
Dept: SURGERY | Facility: CLINIC | Age: 64
DRG: 270 | End: 2024-04-01
Payer: COMMERCIAL

## 2024-04-01 ENCOUNTER — ANESTHESIA EVENT (OUTPATIENT)
Dept: SURGERY | Facility: CLINIC | Age: 64
DRG: 270 | End: 2024-04-01
Payer: COMMERCIAL

## 2024-04-01 DIAGNOSIS — I73.9 PAD (PERIPHERAL ARTERY DISEASE) (H): Primary | ICD-10-CM

## 2024-04-01 LAB
ANION GAP SERPL CALCULATED.3IONS-SCNC: 13 MMOL/L (ref 7–15)
ANION GAP SERPL CALCULATED.3IONS-SCNC: 14 MMOL/L (ref 7–15)
ANION GAP SERPL CALCULATED.3IONS-SCNC: 16 MMOL/L (ref 7–15)
ANION GAP SERPL CALCULATED.3IONS-SCNC: 16 MMOL/L (ref 7–15)
BASE EXCESS BLDV CALC-SCNC: -10.1 MMOL/L (ref -3–3)
BASE EXCESS BLDV CALC-SCNC: -11.3 MMOL/L (ref -3–3)
BASE EXCESS BLDV CALC-SCNC: -12.6 MMOL/L (ref -3–3)
BASOPHILS # BLD AUTO: ABNORMAL 10*3/UL
BASOPHILS # BLD MANUAL: 0 10E3/UL (ref 0–0.2)
BASOPHILS NFR BLD AUTO: ABNORMAL %
BASOPHILS NFR BLD MANUAL: 0 %
BUN SERPL-MCNC: 64.9 MG/DL (ref 8–23)
BUN SERPL-MCNC: 69.9 MG/DL (ref 8–23)
BUN SERPL-MCNC: 73.9 MG/DL (ref 8–23)
BUN SERPL-MCNC: 75.2 MG/DL (ref 8–23)
BURR CELLS BLD QL SMEAR: ABNORMAL
CALCIUM SERPL-MCNC: 5 MG/DL (ref 8.8–10.2)
CALCIUM SERPL-MCNC: 5.7 MG/DL (ref 8.8–10.2)
CALCIUM SERPL-MCNC: 5.9 MG/DL (ref 8.8–10.2)
CALCIUM SERPL-MCNC: 6 MG/DL (ref 8.8–10.2)
CHLORIDE SERPL-SCNC: 105 MMOL/L (ref 98–107)
CHLORIDE SERPL-SCNC: 105 MMOL/L (ref 98–107)
CHLORIDE SERPL-SCNC: 108 MMOL/L (ref 98–107)
CHLORIDE SERPL-SCNC: 109 MMOL/L (ref 98–107)
CK SERPL-CCNC: 7397 U/L (ref 26–192)
CK SERPL-CCNC: 8474 U/L (ref 26–192)
CK SERPL-CCNC: 8760 U/L (ref 26–192)
CK SERPL-CCNC: 9220 U/L (ref 26–192)
CREAT SERPL-MCNC: 2.39 MG/DL (ref 0.51–0.95)
CREAT SERPL-MCNC: 2.58 MG/DL (ref 0.51–0.95)
CREAT SERPL-MCNC: 2.83 MG/DL (ref 0.51–0.95)
CREAT SERPL-MCNC: 2.98 MG/DL (ref 0.51–0.95)
DEPRECATED HCO3 PLAS-SCNC: 12 MMOL/L (ref 22–29)
DEPRECATED HCO3 PLAS-SCNC: 12 MMOL/L (ref 22–29)
DEPRECATED HCO3 PLAS-SCNC: 13 MMOL/L (ref 22–29)
DEPRECATED HCO3 PLAS-SCNC: 14 MMOL/L (ref 22–29)
EGFRCR SERPLBLD CKD-EPI 2021: 17 ML/MIN/1.73M2
EGFRCR SERPLBLD CKD-EPI 2021: 18 ML/MIN/1.73M2
EGFRCR SERPLBLD CKD-EPI 2021: 20 ML/MIN/1.73M2
EGFRCR SERPLBLD CKD-EPI 2021: 22 ML/MIN/1.73M2
EOSINOPHIL # BLD AUTO: ABNORMAL 10*3/UL
EOSINOPHIL # BLD MANUAL: 0 10E3/UL (ref 0–0.7)
EOSINOPHIL NFR BLD AUTO: ABNORMAL %
EOSINOPHIL NFR BLD MANUAL: 0 %
ERYTHROCYTE [DISTWIDTH] IN BLOOD BY AUTOMATED COUNT: 15.8 % (ref 10–15)
FIBRINOGEN PPP-MCNC: 185 MG/DL (ref 170–490)
GLUCOSE BLDC GLUCOMTR-MCNC: 104 MG/DL (ref 70–99)
GLUCOSE BLDC GLUCOMTR-MCNC: 76 MG/DL (ref 70–99)
GLUCOSE BLDC GLUCOMTR-MCNC: 78 MG/DL (ref 70–99)
GLUCOSE BLDC GLUCOMTR-MCNC: 86 MG/DL (ref 70–99)
GLUCOSE BLDC GLUCOMTR-MCNC: 91 MG/DL (ref 70–99)
GLUCOSE SERPL-MCNC: 100 MG/DL (ref 70–99)
GLUCOSE SERPL-MCNC: 104 MG/DL (ref 70–99)
GLUCOSE SERPL-MCNC: 71 MG/DL (ref 70–99)
GLUCOSE SERPL-MCNC: 78 MG/DL (ref 70–99)
GRAM STAIN RESULT: NORMAL
GRAM STAIN RESULT: NORMAL
HCO3 BLDV-SCNC: 15 MMOL/L (ref 21–28)
HCO3 BLDV-SCNC: 15 MMOL/L (ref 21–28)
HCO3 BLDV-SCNC: 16 MMOL/L (ref 21–28)
HCT VFR BLD AUTO: 23.1 % (ref 35–47)
HGB BLD-MCNC: 7.9 G/DL (ref 11.7–15.7)
IMM GRANULOCYTES # BLD: ABNORMAL 10*3/UL
IMM GRANULOCYTES NFR BLD: ABNORMAL %
LYMPHOCYTES # BLD AUTO: ABNORMAL 10*3/UL
LYMPHOCYTES # BLD MANUAL: 0.4 10E3/UL (ref 0.8–5.3)
LYMPHOCYTES NFR BLD AUTO: ABNORMAL %
LYMPHOCYTES NFR BLD MANUAL: 11 %
MCH RBC QN AUTO: 28.5 PG (ref 26.5–33)
MCHC RBC AUTO-ENTMCNC: 34.2 G/DL (ref 31.5–36.5)
MCV RBC AUTO: 83 FL (ref 78–100)
METAMYELOCYTES # BLD MANUAL: 0.1 10E3/UL
METAMYELOCYTES NFR BLD MANUAL: 2 %
MONOCYTES # BLD AUTO: ABNORMAL 10*3/UL
MONOCYTES # BLD MANUAL: 0.1 10E3/UL (ref 0–1.3)
MONOCYTES NFR BLD AUTO: ABNORMAL %
MONOCYTES NFR BLD MANUAL: 2 %
NEUTROPHILS # BLD AUTO: ABNORMAL 10*3/UL
NEUTROPHILS # BLD MANUAL: 3.4 10E3/UL (ref 1.6–8.3)
NEUTROPHILS NFR BLD AUTO: ABNORMAL %
NEUTROPHILS NFR BLD MANUAL: 85 %
NRBC # BLD AUTO: 0 10E3/UL
NRBC BLD AUTO-RTO: 1 /100
O2/TOTAL GAS SETTING VFR VENT: 33 %
O2/TOTAL GAS SETTING VFR VENT: 45 %
O2/TOTAL GAS SETTING VFR VENT: 70 %
OXYHGB MFR BLDV: 74 % (ref 70–75)
OXYHGB MFR BLDV: 76 % (ref 70–75)
OXYHGB MFR BLDV: 76 % (ref 70–75)
PCO2 BLDV: 37 MM HG (ref 40–50)
PCO2 BLDV: 38 MM HG (ref 40–50)
PCO2 BLDV: 40 MM HG (ref 40–50)
PF4 HEPARIN CMPLX AB SER QL: NEGATIVE
PH BLDV: 7.18 [PH] (ref 7.32–7.43)
PH BLDV: 7.23 [PH] (ref 7.32–7.43)
PH BLDV: 7.24 [PH] (ref 7.32–7.43)
PLAT MORPH BLD: ABNORMAL
PLATELET # BLD AUTO: 62 10E3/UL (ref 150–450)
PO2 BLDV: 44 MM HG (ref 25–47)
PO2 BLDV: 46 MM HG (ref 25–47)
PO2 BLDV: 46 MM HG (ref 25–47)
POTASSIUM SERPL-SCNC: 4.7 MMOL/L (ref 3.4–5.3)
POTASSIUM SERPL-SCNC: 5.1 MMOL/L (ref 3.4–5.3)
POTASSIUM SERPL-SCNC: 5.3 MMOL/L (ref 3.4–5.3)
POTASSIUM SERPL-SCNC: 5.3 MMOL/L (ref 3.4–5.3)
RBC # BLD AUTO: 2.77 10E6/UL (ref 3.8–5.2)
RBC MORPH BLD: ABNORMAL
SAO2 % BLDV: 76.1 % (ref 70–75)
SAO2 % BLDV: 77.4 % (ref 70–75)
SAO2 % BLDV: 77.8 % (ref 70–75)
SMUDGE CELLS BLD QL SMEAR: PRESENT
SODIUM SERPL-SCNC: 133 MMOL/L (ref 135–145)
SODIUM SERPL-SCNC: 134 MMOL/L (ref 135–145)
SODIUM SERPL-SCNC: 135 MMOL/L (ref 135–145)
SODIUM SERPL-SCNC: 135 MMOL/L (ref 135–145)
UFH PPP CHRO-ACNC: <0.1 IU/ML
VANCOMYCIN SERPL-MCNC: 13.4 UG/ML
WBC # BLD AUTO: 4 10E3/UL (ref 4–11)

## 2024-04-01 PROCEDURE — 87205 SMEAR GRAM STAIN: CPT | Performed by: SURGERY

## 2024-04-01 PROCEDURE — 250N000013 HC RX MED GY IP 250 OP 250 PS 637: Performed by: PHYSICIAN ASSISTANT

## 2024-04-01 PROCEDURE — 258N000003 HC RX IP 258 OP 636: Performed by: STUDENT IN AN ORGANIZED HEALTH CARE EDUCATION/TRAINING PROGRAM

## 2024-04-01 PROCEDURE — 250N000011 HC RX IP 250 OP 636: Performed by: NURSE PRACTITIONER

## 2024-04-01 PROCEDURE — 85007 BL SMEAR W/DIFF WBC COUNT: CPT | Performed by: RADIOLOGY

## 2024-04-01 PROCEDURE — 250N000009 HC RX 250: Performed by: NURSE ANESTHETIST, CERTIFIED REGISTERED

## 2024-04-01 PROCEDURE — 82805 BLOOD GASES W/O2 SATURATION: CPT | Performed by: STUDENT IN AN ORGANIZED HEALTH CARE EDUCATION/TRAINING PROGRAM

## 2024-04-01 PROCEDURE — 999N000040 HC STATISTIC CONSULT NO CHARGE VASC ACCESS

## 2024-04-01 PROCEDURE — 80048 BASIC METABOLIC PNL TOTAL CA: CPT | Performed by: NURSE PRACTITIONER

## 2024-04-01 PROCEDURE — 999N000157 HC STATISTIC RCP TIME EA 10 MIN

## 2024-04-01 PROCEDURE — 250N000009 HC RX 250: Performed by: STUDENT IN AN ORGANIZED HEALTH CARE EDUCATION/TRAINING PROGRAM

## 2024-04-01 PROCEDURE — 87075 CULTR BACTERIA EXCEPT BLOOD: CPT | Performed by: SURGERY

## 2024-04-01 PROCEDURE — 86022 PLATELET ANTIBODIES: CPT | Performed by: STUDENT IN AN ORGANIZED HEALTH CARE EDUCATION/TRAINING PROGRAM

## 2024-04-01 PROCEDURE — 85048 AUTOMATED LEUKOCYTE COUNT: CPT | Performed by: RADIOLOGY

## 2024-04-01 PROCEDURE — 85520 HEPARIN ASSAY: CPT | Performed by: RADIOLOGY

## 2024-04-01 PROCEDURE — 250N000011 HC RX IP 250 OP 636: Performed by: NURSE ANESTHETIST, CERTIFIED REGISTERED

## 2024-04-01 PROCEDURE — 99233 SBSQ HOSP IP/OBS HIGH 50: CPT | Performed by: STUDENT IN AN ORGANIZED HEALTH CARE EDUCATION/TRAINING PROGRAM

## 2024-04-01 PROCEDURE — 80202 ASSAY OF VANCOMYCIN: CPT | Performed by: STUDENT IN AN ORGANIZED HEALTH CARE EDUCATION/TRAINING PROGRAM

## 2024-04-01 PROCEDURE — 27590 AMPUTATE LEG AT THIGH: CPT | Performed by: NURSE ANESTHETIST, CERTIFIED REGISTERED

## 2024-04-01 PROCEDURE — 271N000001 HC OR GENERAL SUPPLY NON-STERILE: Performed by: SURGERY

## 2024-04-01 PROCEDURE — 370N000017 HC ANESTHESIA TECHNICAL FEE, PER MIN: Performed by: SURGERY

## 2024-04-01 PROCEDURE — 250N000009 HC RX 250: Performed by: SURGERY

## 2024-04-01 PROCEDURE — 82550 ASSAY OF CK (CPK): CPT | Performed by: NURSE PRACTITIONER

## 2024-04-01 PROCEDURE — 999N000141 HC STATISTIC PRE-PROCEDURE NURSING ASSESSMENT: Performed by: SURGERY

## 2024-04-01 PROCEDURE — 250N000013 HC RX MED GY IP 250 OP 250 PS 637: Performed by: STUDENT IN AN ORGANIZED HEALTH CARE EDUCATION/TRAINING PROGRAM

## 2024-04-01 PROCEDURE — 0Y6F0ZZ DETACHMENT AT RIGHT KNEE REGION, OPEN APPROACH: ICD-10-PCS | Performed by: SURGERY

## 2024-04-01 PROCEDURE — 360N000076 HC SURGERY LEVEL 3, PER MIN: Performed by: SURGERY

## 2024-04-01 PROCEDURE — 250N000009 HC RX 250: Performed by: ANESTHESIOLOGY

## 2024-04-01 PROCEDURE — 27590 AMPUTATE LEG AT THIGH: CPT | Performed by: ANESTHESIOLOGY

## 2024-04-01 PROCEDURE — 80048 BASIC METABOLIC PNL TOTAL CA: CPT | Performed by: STUDENT IN AN ORGANIZED HEALTH CARE EDUCATION/TRAINING PROGRAM

## 2024-04-01 PROCEDURE — 82805 BLOOD GASES W/O2 SATURATION: CPT | Performed by: NURSE PRACTITIONER

## 2024-04-01 PROCEDURE — 250N000011 HC RX IP 250 OP 636: Performed by: STUDENT IN AN ORGANIZED HEALTH CARE EDUCATION/TRAINING PROGRAM

## 2024-04-01 PROCEDURE — 27598 AMPUTATE LOWER LEG AT KNEE: CPT | Mod: RT | Performed by: SURGERY

## 2024-04-01 PROCEDURE — P9045 ALBUMIN (HUMAN), 5%, 250 ML: HCPCS | Mod: JZ | Performed by: INTERNAL MEDICINE

## 2024-04-01 PROCEDURE — 272N000001 HC OR GENERAL SUPPLY STERILE: Performed by: SURGERY

## 2024-04-01 PROCEDURE — 87070 CULTURE OTHR SPECIMN AEROBIC: CPT | Performed by: SURGERY

## 2024-04-01 PROCEDURE — 82550 ASSAY OF CK (CPK): CPT | Performed by: STUDENT IN AN ORGANIZED HEALTH CARE EDUCATION/TRAINING PROGRAM

## 2024-04-01 PROCEDURE — 200N000001 HC R&B ICU

## 2024-04-01 PROCEDURE — 99222 1ST HOSP IP/OBS MODERATE 55: CPT | Performed by: INTERNAL MEDICINE

## 2024-04-01 PROCEDURE — 88307 TISSUE EXAM BY PATHOLOGIST: CPT | Mod: 26 | Performed by: PATHOLOGY

## 2024-04-01 PROCEDURE — 250N000025 HC SEVOFLURANE, PER MIN: Performed by: SURGERY

## 2024-04-01 PROCEDURE — 710N000010 HC RECOVERY PHASE 1, LEVEL 2, PER MIN: Performed by: SURGERY

## 2024-04-01 PROCEDURE — 258N000003 HC RX IP 258 OP 636: Performed by: NURSE ANESTHETIST, CERTIFIED REGISTERED

## 2024-04-01 PROCEDURE — 36592 COLLECT BLOOD FROM PICC: CPT | Performed by: STUDENT IN AN ORGANIZED HEALTH CARE EDUCATION/TRAINING PROGRAM

## 2024-04-01 PROCEDURE — 250N000011 HC RX IP 250 OP 636: Mod: JZ | Performed by: INTERNAL MEDICINE

## 2024-04-01 PROCEDURE — 88307 TISSUE EXAM BY PATHOLOGIST: CPT | Mod: TC | Performed by: SURGERY

## 2024-04-01 RX ORDER — SODIUM CHLORIDE, SODIUM LACTATE, POTASSIUM CHLORIDE, CALCIUM CHLORIDE 600; 310; 30; 20 MG/100ML; MG/100ML; MG/100ML; MG/100ML
INJECTION, SOLUTION INTRAVENOUS CONTINUOUS PRN
Status: DISCONTINUED | OUTPATIENT
Start: 2024-04-01 | End: 2024-04-01

## 2024-04-01 RX ORDER — LIDOCAINE HYDROCHLORIDE 20 MG/ML
INJECTION, SOLUTION INFILTRATION; PERINEURAL PRN
Status: DISCONTINUED | OUTPATIENT
Start: 2024-04-01 | End: 2024-04-01

## 2024-04-01 RX ORDER — ONDANSETRON 2 MG/ML
INJECTION INTRAMUSCULAR; INTRAVENOUS PRN
Status: DISCONTINUED | OUTPATIENT
Start: 2024-04-01 | End: 2024-04-01

## 2024-04-01 RX ORDER — LIDOCAINE HYDROCHLORIDE AND EPINEPHRINE 10; 10 MG/ML; UG/ML
INJECTION, SOLUTION INFILTRATION; PERINEURAL PRN
Status: DISCONTINUED | OUTPATIENT
Start: 2024-04-01 | End: 2024-04-03 | Stop reason: HOSPADM

## 2024-04-01 RX ORDER — PROCHLORPERAZINE MALEATE 5 MG
5 TABLET ORAL EVERY 6 HOURS PRN
Status: DISCONTINUED | OUTPATIENT
Start: 2024-04-01 | End: 2024-04-11

## 2024-04-01 RX ORDER — HYDROMORPHONE HCL IN WATER/PF 6 MG/30 ML
0.1 PATIENT CONTROLLED ANALGESIA SYRINGE INTRAVENOUS
Status: DISCONTINUED | OUTPATIENT
Start: 2024-04-01 | End: 2024-04-09

## 2024-04-01 RX ORDER — VASOPRESSIN IN 0.9 % NACL 2 UNIT/2ML
SYRINGE (ML) INTRAVENOUS PRN
Status: DISCONTINUED | OUTPATIENT
Start: 2024-04-01 | End: 2024-04-01

## 2024-04-01 RX ORDER — DEXAMETHASONE SODIUM PHOSPHATE 4 MG/ML
INJECTION, SOLUTION INTRA-ARTICULAR; INTRALESIONAL; INTRAMUSCULAR; INTRAVENOUS; SOFT TISSUE PRN
Status: DISCONTINUED | OUTPATIENT
Start: 2024-04-01 | End: 2024-04-01

## 2024-04-01 RX ORDER — AMOXICILLIN 250 MG
1 CAPSULE ORAL 2 TIMES DAILY
Status: DISCONTINUED | OUTPATIENT
Start: 2024-04-01 | End: 2024-04-04

## 2024-04-01 RX ORDER — CALCIUM GLUCONATE 20 MG/ML
2 INJECTION, SOLUTION INTRAVENOUS ONCE
Status: COMPLETED | OUTPATIENT
Start: 2024-04-01 | End: 2024-04-01

## 2024-04-01 RX ORDER — CALCIUM GLUCONATE 20 MG/ML
2 INJECTION, SOLUTION INTRAVENOUS ONCE
Qty: 100 ML | Refills: 0 | Status: COMPLETED | OUTPATIENT
Start: 2024-04-01 | End: 2024-04-01

## 2024-04-01 RX ORDER — FENTANYL CITRATE 50 UG/ML
INJECTION, SOLUTION INTRAMUSCULAR; INTRAVENOUS PRN
Status: DISCONTINUED | OUTPATIENT
Start: 2024-04-01 | End: 2024-04-01

## 2024-04-01 RX ORDER — LIDOCAINE 40 MG/G
CREAM TOPICAL
Status: DISCONTINUED | OUTPATIENT
Start: 2024-04-01 | End: 2024-04-22 | Stop reason: HOSPADM

## 2024-04-01 RX ORDER — PROPOFOL 10 MG/ML
INJECTION, EMULSION INTRAVENOUS CONTINUOUS PRN
Status: DISCONTINUED | OUTPATIENT
Start: 2024-04-01 | End: 2024-04-01

## 2024-04-01 RX ORDER — HYDROMORPHONE HCL IN WATER/PF 6 MG/30 ML
0.2 PATIENT CONTROLLED ANALGESIA SYRINGE INTRAVENOUS
Status: DISCONTINUED | OUTPATIENT
Start: 2024-04-01 | End: 2024-04-09

## 2024-04-01 RX ORDER — PROPOFOL 10 MG/ML
INJECTION, EMULSION INTRAVENOUS PRN
Status: DISCONTINUED | OUTPATIENT
Start: 2024-04-01 | End: 2024-04-01

## 2024-04-01 RX ORDER — ONDANSETRON 2 MG/ML
4 INJECTION INTRAMUSCULAR; INTRAVENOUS EVERY 6 HOURS PRN
Status: DISCONTINUED | OUTPATIENT
Start: 2024-04-01 | End: 2024-04-19

## 2024-04-01 RX ORDER — ONDANSETRON 4 MG/1
4 TABLET, ORALLY DISINTEGRATING ORAL EVERY 6 HOURS PRN
Status: DISCONTINUED | OUTPATIENT
Start: 2024-04-01 | End: 2024-04-22 | Stop reason: HOSPADM

## 2024-04-01 RX ORDER — OXYCODONE HYDROCHLORIDE 5 MG/1
5 TABLET ORAL EVERY 4 HOURS PRN
Status: DISCONTINUED | OUTPATIENT
Start: 2024-04-01 | End: 2024-04-10

## 2024-04-01 RX ORDER — LIDOCAINE HYDROCHLORIDE AND EPINEPHRINE 10; 10 MG/ML; UG/ML
INJECTION, SOLUTION INFILTRATION; PERINEURAL
Status: DISCONTINUED
Start: 2024-04-01 | End: 2024-04-01 | Stop reason: HOSPADM

## 2024-04-01 RX ORDER — ACETAMINOPHEN 325 MG/1
975 TABLET ORAL ONCE
Status: COMPLETED | OUTPATIENT
Start: 2024-04-01 | End: 2024-04-01

## 2024-04-01 RX ORDER — CEFEPIME HYDROCHLORIDE 1 G/1
1 INJECTION, POWDER, FOR SOLUTION INTRAMUSCULAR; INTRAVENOUS EVERY 24 HOURS
Qty: 10 ML | Refills: 0 | Status: COMPLETED | OUTPATIENT
Start: 2024-04-02 | End: 2024-04-02

## 2024-04-01 RX ORDER — ACETAMINOPHEN 325 MG/1
650 TABLET ORAL EVERY 4 HOURS PRN
Status: DISCONTINUED | OUTPATIENT
Start: 2024-04-04 | End: 2024-04-22 | Stop reason: HOSPADM

## 2024-04-01 RX ORDER — BISACODYL 10 MG
10 SUPPOSITORY, RECTAL RECTAL DAILY PRN
Status: DISCONTINUED | OUTPATIENT
Start: 2024-04-04 | End: 2024-04-05

## 2024-04-01 RX ORDER — POLYETHYLENE GLYCOL 3350 17 G/17G
17 POWDER, FOR SOLUTION ORAL DAILY
Status: DISCONTINUED | OUTPATIENT
Start: 2024-04-02 | End: 2024-04-08

## 2024-04-01 RX ORDER — CLINDAMYCIN PHOSPHATE 900 MG/50ML
900 INJECTION, SOLUTION INTRAVENOUS SEE ADMIN INSTRUCTIONS
Status: DISCONTINUED | OUTPATIENT
Start: 2024-04-01 | End: 2024-04-01

## 2024-04-01 RX ORDER — CLINDAMYCIN PHOSPHATE 900 MG/50ML
900 INJECTION, SOLUTION INTRAVENOUS
Status: DISCONTINUED | OUTPATIENT
Start: 2024-04-01 | End: 2024-04-01

## 2024-04-01 RX ORDER — IPRATROPIUM BROMIDE AND ALBUTEROL SULFATE 2.5; .5 MG/3ML; MG/3ML
3 SOLUTION RESPIRATORY (INHALATION) ONCE
Status: COMPLETED | OUTPATIENT
Start: 2024-04-01 | End: 2024-04-01

## 2024-04-01 RX ORDER — ACETAMINOPHEN 325 MG/1
975 TABLET ORAL EVERY 8 HOURS
Status: DISCONTINUED | OUTPATIENT
Start: 2024-04-01 | End: 2024-04-04

## 2024-04-01 RX ADMIN — CALCIUM GLUCONATE 2 G: 20 INJECTION, SOLUTION INTRAVENOUS at 07:41

## 2024-04-01 RX ADMIN — AMLODIPINE BESYLATE 2.5 MG: 2.5 TABLET ORAL at 21:31

## 2024-04-01 RX ADMIN — PHENYLEPHRINE HYDROCHLORIDE 200 MCG: 10 INJECTION INTRAVENOUS at 10:27

## 2024-04-01 RX ADMIN — SUGAMMADEX 200 MG: 100 INJECTION, SOLUTION INTRAVENOUS at 11:22

## 2024-04-01 RX ADMIN — NICOTINE 1 PATCH: 14 PATCH, EXTENDED RELEASE TRANSDERMAL at 08:12

## 2024-04-01 RX ADMIN — SODIUM BICARBONATE 20 ML/HR: 84 INJECTION, SOLUTION INTRAVENOUS at 15:50

## 2024-04-01 RX ADMIN — VANCOMYCIN HYDROCHLORIDE 1250 MG: 10 INJECTION, POWDER, LYOPHILIZED, FOR SOLUTION INTRAVENOUS at 08:58

## 2024-04-01 RX ADMIN — PHENYLEPHRINE HYDROCHLORIDE 200 MCG: 10 INJECTION INTRAVENOUS at 10:38

## 2024-04-01 RX ADMIN — OMEPRAZOLE 20 MG: 20 CAPSULE, DELAYED RELEASE ORAL at 08:06

## 2024-04-01 RX ADMIN — PROPOFOL 20 MG: 10 INJECTION, EMULSION INTRAVENOUS at 11:26

## 2024-04-01 RX ADMIN — GABAPENTIN 100 MG: 100 CAPSULE ORAL at 08:06

## 2024-04-01 RX ADMIN — ONDANSETRON 4 MG: 2 INJECTION INTRAMUSCULAR; INTRAVENOUS at 10:31

## 2024-04-01 RX ADMIN — ACETAMINOPHEN 975 MG: 325 TABLET, FILM COATED ORAL at 21:31

## 2024-04-01 RX ADMIN — PHENYLEPHRINE HYDROCHLORIDE 200 MCG: 10 INJECTION INTRAVENOUS at 10:30

## 2024-04-01 RX ADMIN — DEXAMETHASONE SODIUM PHOSPHATE 4 MG: 4 INJECTION, SOLUTION INTRA-ARTICULAR; INTRALESIONAL; INTRAMUSCULAR; INTRAVENOUS; SOFT TISSUE at 10:31

## 2024-04-01 RX ADMIN — BETAMETHASONE DIPROPIONATE: 0.5 CREAM TOPICAL at 21:32

## 2024-04-01 RX ADMIN — SODIUM BICARBONATE: 84 INJECTION, SOLUTION INTRAVENOUS at 02:55

## 2024-04-01 RX ADMIN — ALBUMIN HUMAN 12.5 G: 0.05 INJECTION, SOLUTION INTRAVENOUS at 15:23

## 2024-04-01 RX ADMIN — Medication 1 UNITS: at 10:47

## 2024-04-01 RX ADMIN — SODIUM BICARBONATE 75 MEQ: 84 INJECTION, SOLUTION INTRAVENOUS at 02:43

## 2024-04-01 RX ADMIN — SODIUM BICARBONATE: 84 INJECTION, SOLUTION INTRAVENOUS at 15:23

## 2024-04-01 RX ADMIN — PHENYLEPHRINE HYDROCHLORIDE 300 MCG: 10 INJECTION INTRAVENOUS at 10:35

## 2024-04-01 RX ADMIN — CEFEPIME HYDROCHLORIDE 1 G: 1 INJECTION, POWDER, FOR SOLUTION INTRAMUSCULAR; INTRAVENOUS at 08:40

## 2024-04-01 RX ADMIN — FENTANYL CITRATE 100 MCG: 50 INJECTION INTRAMUSCULAR; INTRAVENOUS at 10:52

## 2024-04-01 RX ADMIN — CALCIUM GLUCONATE 2 G: 20 INJECTION, SOLUTION INTRAVENOUS at 00:53

## 2024-04-01 RX ADMIN — PHENYLEPHRINE HYDROCHLORIDE 0.5 MCG/KG/MIN: 10 INJECTION INTRAVENOUS at 10:26

## 2024-04-01 RX ADMIN — PROPOFOL 20 MCG/KG/MIN: 10 INJECTION, EMULSION INTRAVENOUS at 10:27

## 2024-04-01 RX ADMIN — LIDOCAINE HYDROCHLORIDE 100 MG: 20 INJECTION, SOLUTION INFILTRATION; PERINEURAL at 10:23

## 2024-04-01 RX ADMIN — IPRATROPIUM BROMIDE AND ALBUTEROL SULFATE 3 ML: .5; 3 SOLUTION RESPIRATORY (INHALATION) at 09:51

## 2024-04-01 RX ADMIN — PROPOFOL 100 MG: 10 INJECTION, EMULSION INTRAVENOUS at 10:23

## 2024-04-01 RX ADMIN — PHENYLEPHRINE HYDROCHLORIDE 300 MCG: 10 INJECTION INTRAVENOUS at 10:33

## 2024-04-01 RX ADMIN — ROCURONIUM BROMIDE 50 MG: 50 INJECTION, SOLUTION INTRAVENOUS at 10:23

## 2024-04-01 RX ADMIN — SODIUM BICARBONATE 20 ML/HR: 84 INJECTION, SOLUTION INTRAVENOUS at 21:31

## 2024-04-01 RX ADMIN — SODIUM CHLORIDE, POTASSIUM CHLORIDE, SODIUM LACTATE AND CALCIUM CHLORIDE: 600; 310; 30; 20 INJECTION, SOLUTION INTRAVENOUS at 10:12

## 2024-04-01 RX ADMIN — PHENYLEPHRINE HYDROCHLORIDE 200 MCG: 10 INJECTION INTRAVENOUS at 10:39

## 2024-04-01 RX ADMIN — MIDAZOLAM 1 MG: 1 INJECTION INTRAMUSCULAR; INTRAVENOUS at 10:12

## 2024-04-01 RX ADMIN — GABAPENTIN 100 MG: 100 CAPSULE ORAL at 21:31

## 2024-04-01 RX ADMIN — FAMOTIDINE 20 MG: 10 INJECTION, SOLUTION INTRAVENOUS at 05:17

## 2024-04-01 RX ADMIN — PHENYLEPHRINE HYDROCHLORIDE 100 MCG: 10 INJECTION INTRAVENOUS at 10:41

## 2024-04-01 ASSESSMENT — ACTIVITIES OF DAILY LIVING (ADL)
ADLS_ACUITY_SCORE: 38
ADLS_ACUITY_SCORE: 39
ADLS_ACUITY_SCORE: 38
ADLS_ACUITY_SCORE: 34
ADLS_ACUITY_SCORE: 38
ADLS_ACUITY_SCORE: 34
ADLS_ACUITY_SCORE: 38
ADLS_ACUITY_SCORE: 34
ADLS_ACUITY_SCORE: 38
ADLS_ACUITY_SCORE: 34
ADLS_ACUITY_SCORE: 34
ADLS_ACUITY_SCORE: 38
ADLS_ACUITY_SCORE: 38
ADLS_ACUITY_SCORE: 34
ADLS_ACUITY_SCORE: 39
ADLS_ACUITY_SCORE: 34
ADLS_ACUITY_SCORE: 39
ADLS_ACUITY_SCORE: 38
ADLS_ACUITY_SCORE: 38

## 2024-04-01 ASSESSMENT — LIFESTYLE VARIABLES: TOBACCO_USE: 1

## 2024-04-01 ASSESSMENT — COPD QUESTIONNAIRES: COPD: 1

## 2024-04-01 ASSESSMENT — ENCOUNTER SYMPTOMS: DYSRHYTHMIAS: 1

## 2024-04-01 NOTE — PROGRESS NOTES
[facility]    Medicine Progress Note - Hospitalist Service    Date of Admission:  3/29/2024  Date of Service: 04/01/2024    Assessment & Plan   Shirley Hendricks is a 63 year old female admitted on 3/29/2024 due to occlusion of right LE bypass graft.      Past medical history significant for PAD/PVD, Tobacco use D/O, CAD (history of MI x3), HTN, HLP, DM2, Neuropathy, COPD, GERD, Anemia, Spongiotic dermatitis, MDD with anxiety, Chronic anticoagulation therapy with history of DVT/PE, OA, Charcot-Breonna-Tooth foot deformity, Marginal zone B-cell lymphoma, History of SBO, History of Takotsubo CM, History of SVT.    Discussed with Dr. Mazariegos and reviewed ED notes and Vascular Surgery consult note.  Patient presented to the ED due to right leg pain that had begun ~ 1 hour prior to presentation.  She reported pain seems to worsen with walking and when she attempted to check a pulse in her leg it was absent.  She also described that the foot is colder than the left.      While in the lobby attempts to find right pedal pulse with doppler were unsuccessful.  Dr. Lozano of Vascular Surgery was contacted by the patient as well as Dr. Mazariegos.      Work-up completed while patient was in the lobby included right arterial LE US revealed the right common femoral artery to mid anterior tibial artery bypass graft occlusion.      After assessing patient labs were collected and included a CMP that revealed a sodium of 132, chloride of 97, CO2 of 21 and glucose of 103 otherwise within normal limits.  CBC with differential revealed an RDW of 15.7 otherwise unremarkable.    Right common femoral artery to mid anterior tibial artery bypass graft acute occlusion  PAD/PVD  Acute Blood Loss Anemia  *Occurred despite low-dose Xarelto and Plavix therapy.    - Vascular surgery consult requested -> urgent guillotine right above knee amputation today followed by close monitoring in the ICU.    - IR consulted and  following  --Per Vascular surgery consult Dr. Salomon was contacted and s/p right LE angiogram and lytic therapy 03/30 -> 03/31 took the patient to the angio suite to remove the sheath in the left groin with closure device.   - Hold PTA Xarelto 15 mg nightly.    - Defer resumption of PTA Plavix 75 mg/d to Vascular Surgery and/or IR.    - Statin and antihypertensive management as below.    - Pain management as needed  - Follow Hgb   - Cannot perform CTA of abdomen at this time and since hemodynamically stable ->  angiogram will see if there is any active extravasation.   - transfuse 1 U PRBCs 03/30 and closely monitor.    Rhabdomyolysis   LIMA  Assessment: CPK has been rising. Her serum creatinine and body urea nitrogen also rising. Renal US -> No obstruction demonstrated.   Plan:  - IVF continued  - Follow CK / BMP  - Nephrology following  - Alvarez catheter in place  - Avoid NSAIDs / nephrotoxins    Contrast dye allergy  - Ordered solumedrol and benadryl per contrast dye allergy protocol.      Mild hyponatremia  - BMP ordered for AM of 3/30.      Tobacco Use D/O   Patient currently smokes max 5 cigarettes per day.     - Counseled regarding smoking cessation that should also continue with PCP.    - Nicoderm patch ordered.    Recent celiac disease  *Patient reported recent GI work-up revealed she has celiac disease.    - Once diet can be advanced would request no gluten/celiac diet.      CAD (history of MI x3)  HTN  HLP  *Last coronary angiogram completed 10/2023.    - Resumed on PTA Coreg 6.25 mg BID, hold lisinopril 10 mg/d due to LIMA, continue Norvasc 2.5 mg/d.  Hold parameters in place.    - hold PTA rosuvastatin 40 mg/d due to acute rhabdo  - PRN IV hydralazine 10 mg every 4 hours for SBP GREATER THAN 180.    - Monitor on telemetry.      History of Takotsubo CM  *Recovered EF (55-60%) from ECHO 11/2023.    - Resumed on PTA Coreg 6.25 mg BID, lisinopril 10 mg/d and Jardiance 10 mg/d.      Type 2 DM  Diabetic  neuropathy  *Noted diagnosis on chart review.  However, A1c of 5.2%.    - Resumed on PTA Jardiance 10 mg/d and gabapentin 100 mg TID.      COPD   - Resumed on PTA inhalers.    - Encourage use of Flutter valve/IS.      GERD  - Resumed on PTA omeprazole 20 mg/d.      Anemia  Recent GI bleed (12/2023 and admitted at [facility])  - Hold PTA Iron supplements and resume at discharge.    - CBC daily      Spongiotic dermatitis  *Recently seen at outpatient Derm.    - Resumed on PTA betamethasone cream BID.      MDD with anxiety  *Noted on chart review.  No interventions.      Chronic anticoagulation therapy with history of DVT/PE  - Hold PTA Xarelto.      OA  - Pain management as above.      Charcot-Breonna-Tooth foot deformity  *Noted on chart review.  No interventions.     Stage VALENTIN marginal zone B-cell lymphoma  *Follows with [facility] (Dr. Barrow).    - Continue outpatient surveillance (CT scan in 8/2024).      History of SBO  *Noted on chart review.  No interventions.    History of SVT  - Monitor on telemetry.    - Resumed on PTA Coreg as above.            Diet: Advance Diet as Tolerated: Clear Liquid Diet    DVT Prophylaxis: Pneumatic Compression Devices  Alvarez Catheter: PRESENT, indication: Acute retention or obstruction  Lines: PRESENT      PICC 03/31/24 Double Lumen Right Brachial vein medial access-Site Assessment: WDL      Cardiac Monitoring: ACTIVE order. Indication: ICU  Code Status: Full Code      Clinically Significant Risk Factors             # Anion Gap Metabolic Acidosis: Highest Anion Gap = 19 mmol/L in last 2 days, will monitor and treat as appropriate    # Thrombocytopenia: Lowest platelets = 62 in last 2 days, will monitor for bleeding  # Acute Kidney Injury, unspecified: based on a >150% or 0.3 mg/dL increase in last creatinine compared to past 90 day average, will monitor renal function  # Hypertension: Noted on problem list            # Financial/Environmental Concerns:           Disposition Plan      Expected Discharge Date: 04/05/2024,  3:00 PM                Marcin White MD  Hospitalist Service  [facility]  Securely message with [employer] (more info)  Text page via its learning Paging/Directory   ______________________________________________________________________    Interval History     RRT overnight for hypoxia -> O2 needs stable today  No fevers  No new CP/SOB  No new complaints  Vascular surgery -> urgent guillotine right above knee amputation today  No new complaints    Physical Exam   Vital Signs: Temp: 98.2  F (36.8  C) Temp src: Bladder BP: 127/65 Pulse: 80   Resp: 15 SpO2: 96 % O2 Device: Oxymask Oxygen Delivery: 4 LPM  Weight: 121 lbs 12.8 oz      Constitutional: Awake, alert, cooperative, no apparent distress.    Pulmonary: CTABL  Cardiovascular: Regular rate and rhythm, normal S1 and S2, no S3 or S4, and no murmur noted.  GI: Normal bowel sounds, soft, non-distended, non-tender.    Skin/Integumen: Visualized skin appeared clear.  Neuro: Fahad but has sensory loss and is not able to wiggle toes but is able to plantarflex and dorsiflex.   Psych:  Alert and oriented x 3. Normal affect.  Extremities: No lower extremity edema noted, and calves are non-tender to palpation bilaterally.  Right foot is cool to touch.   ----------------------------------------------------------------------------------------    Medical Decision Making       60 MINUTES SPENT BY ME on the date of service doing chart review, history, exam, documentation & further activities per the note.      Data   ------------------------- PAST 24 HR DATA REVIEWED -----------------------------------------------    I have personally reviewed the following data over the past 24 hrs:    4.0  \   7.9 (L)   / 62 (L)     133 (L) 105 73.9 (H) /  71   5.3 12 (L) 2.83 (H) \     Procal: N/A CRP: N/A Lactic Acid: 1.1       INR:  N/A PTT:  N/A   D-dimer:  N/A Fibrinogen:  185       Imaging results reviewed over the past 24 hrs:    Recent Results (from the past 24 hour(s))   US Renal Complete Non-Vascular    Narrative    EXAM: US RENAL COMPLETE NON-VASCULAR  LOCATION: [facility]  DATE: 3/31/2024    INDICATION: ARF  COMPARISON: None.  TECHNIQUE: Routine Bilateral Renal and Bladder Ultrasound.    FINDINGS:    RIGHT KIDNEY: 10.9 x 5.1 x 5.1 cm. Normal parenchyma, without hydronephrosis or masses. Trace perinephric fluid.    LEFT KIDNEY: 6.3 x 3.6 x 4.0 cm. Atrophic without hydronephrosis or masses.     BLADDER: Decompressed secondary to catheter.      Impression    IMPRESSION:  1.  No obstruction demonstrated.   XR Chest Port 1 View    Narrative    EXAM: XR CHEST PORT 1 VIEW  LOCATION: [facility]  DATE: 3/31/2024    INDICATION: RRT, hypoxia  COMPARISON: 12/10/2023      Impression    IMPRESSION: Heart is normal in size. Elevation of the right hemidiaphragm. Lungs otherwise appear clear. Right PICC line tip in the distal SVC.     ------------------------- ENCOUNTER LABS ----------------------------------------------------------------  Recent Labs   Lab 04/01/24  1419 04/01/24  1347 04/01/24  0849 04/01/24  0539 04/01/24  0437 03/31/24  2338 03/31/24  0530 03/31/24  0051 03/30/24  1609 03/30/24  1207   WBC  --   --   --  4.0  --   --   --  10.7  --  6.5   HGB  --   --   --  7.9*  --   --   --  8.5*  --  8.2*   MCV  --   --   --  83  --   --   --  87  --  88   PLT  --   --   --  62*  --   --   --  131*  --  171   *  --   --  135  --  134*   < >  --   --   --    POTASSIUM 5.3  --   --  4.7  --  5.1   < >  --   --   --    CHLORIDE 105  --   --  108*  --  109*   < >  --   --   --    CO2 12*  --   --  13*  --  12*   < >  --   --   --    BUN 73.9*  --   --  69.9*  --  64.9*   < >  --   --   --    CR 2.83*  --   --  2.58*  --  2.39*   < >  --   --   --    ANIONGAP 16*  --   --  14  --  13   < >  --   --   --    SONIA 6.0*  --   --  5.7*  --  5.0*   < >  --   --   --    GLC 71 78 91 104*   < > 100*    < >  --    < >  --     < > = values in this interval not displayed.       Most Recent 3 CBC's:  Recent Labs   Lab Test 04/01/24  0539 03/31/24  0051 03/30/24  1207   WBC 4.0 10.7 6.5   HGB 7.9* 8.5* 8.2*   MCV 83 87 88   PLT 62* 131* 171     Most Recent 3 BMP's:  Recent Labs   Lab Test 04/01/24  1419 04/01/24  1347 04/01/24  0849 04/01/24  0539 04/01/24  0437 03/31/24  2338   *  --   --  135  --  134*   POTASSIUM 5.3  --   --  4.7  --  5.1   CHLORIDE 105  --   --  108*  --  109*   CO2 12*  --   --  13*  --  12*   BUN 73.9*  --   --  69.9*  --  64.9*   CR 2.83*  --   --  2.58*  --  2.39*   ANIONGAP 16*  --   --  14  --  13   SONIA 6.0*  --   --  5.7*  --  5.0*   GLC 71 78 91 104*   < > 100*    < > = values in this interval not displayed.     Most Recent 2 LFT's:  Recent Labs   Lab Test 10/07/23  1950 10/03/23  0737   AST 18 25   ALT 15 22   ALKPHOS 35 82   BILITOTAL 0.2 0.9     Most Recent 3 INR's:  Recent Labs   Lab Test 10/07/23  0516 10/06/23  0551 10/05/23  1014   INR 1.38* 1.08 0.95     Most Recent 3 Troponin's:  Recent Labs   Lab Test 06/10/20  1243 02/16/20  1824 09/18/19  0739   TROPI <0.015 <0.015 <0.015     Most Recent 3 BNP's:No lab results found.  Most Recent D-dimer:  Recent Labs   Lab Test 08/13/21  0934   DD 10.38*     Most Recent Cholesterol Panel:  Recent Labs   Lab Test 10/03/23  0941   CHOL 137   LDL 69   HDL 54   TRIG 68     Most Recent 6 Bacteria Isolates From Any Culture (See EPIC Reports for Culture Details):No lab results found.  Most Recent TSH and T4:  Recent Labs   Lab Test 01/05/21  1119 02/04/19  0934 03/28/17  0922   TSH 0.77   < > 1.37   T4  --   --  1.19    < > = values in this interval not displayed.     Most Recent Urinalysis:  Recent Labs   Lab Test 02/04/19  0935   COLOR Yellow   APPEARANCE Clear   URINEGLC Negative   URINEBILI Negative   URINEKETONE Negative   SG 1.010   UBLD Negative   URINEPH 6.0   PROTEIN Negative   UROBILINOGEN 0.2   NITRITE Negative   LEUKEST Negative    RBCU O - 2   WBCU 0 - 5     Most Recent ESR & CRP:  Recent Labs   Lab Test 11/13/23  1705   SED 39*

## 2024-04-01 NOTE — PROVIDER NOTIFICATION
MD Notification    Notified Person: MD    Notified Person Name: Ani    Notification Date/Time: 3/31/24 7:46pm    Notification Interaction: [employer] messaging    Purpose of Notification: Pt has increased O2 need; currently on 15 L oxymask; requesting to check blood gases(ABGs)    Orders Received:    Comments: Call RRT

## 2024-04-01 NOTE — ANESTHESIA CARE TRANSFER NOTE
Patient: Shirley Hendricks    Procedure: Procedure(s):  AMPUTATION, ABOVE KNEE       Diagnosis: Arterial occlusion [I70.90]  Diagnosis Additional Information: No value filed.    Anesthesia Type:   General     Note:    Oropharynx: oral airway in place  Level of Consciousness: drowsy  Oxygen Supplementation: face mask    Independent Airway: airway patency satisfactory and stable  Dentition: dentition unchanged  Vital Signs Stable: post-procedure vital signs reviewed and stable  Report to RN Given: handoff report given  Patient transferred to: PACU    Handoff Report: Identifed the Patient, Identified the Reponsible Provider, Reviewed the pertinent medical history, Discussed the surgical course, Reviewed Intra-OP anesthesia mangement and issues during anesthesia, Set expectations for post-procedure period and Allowed opportunity for questions and acknowledgement of understanding      Vitals:  Vitals Value Taken Time   BP     Temp 37  C (98.6  F) 04/01/24 1150   Pulse 81 04/01/24 1150   Resp     SpO2 96 % 04/01/24 1150   Vitals shown include unfiled device data.    Electronically Signed By: NOELLE Spivey CRNA  April 1, 2024  11:51 AM

## 2024-04-01 NOTE — PROGRESS NOTES
Brief Nephrology Note     Patient admitted with ischemic RLE due to occluded bypass graft with plan for AKA due to unsalvagable limb.     Patient with worsening UOP, increasing Cr, Increasing CK (now 8474K), worsening acidosis to 7.15 with bicarb 12, and hypocalcemia.     Agree with IVF for rhabdo unless patient showing signs of hypervolemia which per report she is not. CXR clear. Likely SOB due to acidosis. No hyperkalemia so no urgent need for dialysis.     Will give her 75 mEq bicarb via ampules now and change IVF to D5 w sodium bicarb (150 mEq/L) 125 ml/h. Please decrease rate or stop fluids if developing signs of hypervolemia.     - trend CK and BMP   - IVF as above   - please page if develops hyperkalemia in setting of oliguric LIMA   - formal consult to follow in AM      Discussed with house BRANDON Barrientos CNP.     Kiana Joseph MD   [facility]- Nephrology

## 2024-04-01 NOTE — PROGRESS NOTES
Vascular Surgery update    Paged regarding  patient now on high flow nasal cannula 80% fio2, acidotic with Venous pH of 7.19, CK 9000. Lactic acid 1.1 at 8 PM. Per rapid response nurse practitioner, hemodynamically stable, mentating appropriately and breathing comfortably on high flow.     Change in patient status is secondary to ongoing decompensation of ischemic right leg    - recommend IVF for rhabdomyolysis  -Continue bro ad, spectrum antibiotics   - Make NPO , plan for guillotine above the amputation tomorrow.   - Please alert Vascular Surgery should patient become hemodynamically unstable    Bj Lo MD  Vascular surgery PGY3

## 2024-04-01 NOTE — CONSULTS
RENAL CONSULTATION NOTE      REFERRING MD:  Nigel    REASON FOR CONSULTATION:  Acute Kidney Injury        A/P:     1.  Suspect modest CKD at baseline   -slow progression over time   -DN/vascular disease  2.  Last available UA 2019   -no quantification studies  3.  Acute Kidney Injury   -oliguric   -IV contrast    -3 separate consecutive exposures    -elevated CK    Likely BAILEY +/- rhabdo +/- ischemic injury  Presuming no interruption of RBF    4.  Acidosis   -NAGMA   -small respiratory component   5.  Ischemic RLE due to occluded bypass graft   -AKA due to unsalvagable limb 04/01/24  6.  Hypocalcemia      No current dialysis indication  1:1 HCO3- infusion to limit volume  Avoid nephrotoxins    No diuretic at this time  Single dose 5% albumin     Follow labs      HPI:     63-year-old female admitted with right lower extremity ischemia secondary to occluded bypass graft.    Now status post BKA secondary to unsalvageable limb.  Will need additional surgery to convert to AKA by review.    Modest renal insufficiency at baseline by data review  Baseline creatinine somewhere in the range of 0.8 to 0.9 mg/dL  No recent urinalysis for review  Longstanding diabetes  Significant vascular morbidity    IV contrast on 3 separate occasions  03/29; 03/30; and 03/31/24.    Rise in creatinine corresponds with IV contrast load.  Developing oliguria  Persistent metabolic acidosis  Primarily non-anion gap    Elevated CK in the setting of unsalvageable limb  Component of rhabdomyolysis in the setting    Patient is sedated post procedure  Comfortable in the ICU  No current requirement for vasopressor medication    Urine output today 60 cc    ROS:  A complete 10 point review of systems was performed and is negative except as noted above.    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 clot    Post-menopausal     Reflux esophagitis 02/2004    EGD: esophagitis and medium HH    SBO (small bowel obstruction) (H) 08/10/2021    SVT (supraventricular tachycardia)     Thrombocytopenia (H24)     Uncomplicated asthma     Vitamin C deficiency 08/12/2018       PSH:    Past Surgical History:   Procedure Laterality Date    ANGIOGRAM      ANGIOGRAM Right 6/23/2021    Procedure: RIGHT LOWER EXTREMITY ANGIOGRAM WITH LEFT BRACHIAL ARTERY CUTDOWN;  Surgeon: José Luis Hernandez MD;  Location: [facility] OR    BIOPSY MASS NECK Right 10/11/2023    Procedure: Right Parotid Biopsy;  Surgeon: Randal Mendoza MD;  Location: [facility] OR    BYPASS GRAFT FEMOROPOPLITEAL Right 09/23/2020    Procedure: RIGHT FEMORAL GRAFT TO ABOVE-KNEE POPLITEAL BYPASS USING CADAVERIC SUPERFICIAL FEMORAL ARTERY;  Surgeon: Chadwick Lozano MD;  Location: [facility] OR    BYPASS GRAFT FEMOROPOPLITEAL Right 2/15/2022    Procedure: RIGHT SUPERFICIAL FEMORAL ARTERY GRAFT TO BELOW KNEE POPLITEAL BYPASS WITH CADAVERIC CRYOLIFE  FEMORAL-POPLITEAL ARTERY SIZE: OUTER DIAMETER: 7MM - 6MM;  Surgeon: Chadwick Lozano;  Location: [facility]    BYPASS GRAFT FEMOROPOPLITEAL Right 5/26/2023    Procedure: RIGHT DISTAL SUPERFICIAL FEMORAL GRAFT TO ANTERIOR TIBIAL ARTERY WITH 26 CENTIMETER CADAVERIC SUPERFICIAL FEMORAL ARTERY GRAFT;  Surgeon:  Chadwick Lozano MD;  Location:  OR    BYPASS GRAFT FEMOROPOPLITEAL Right 10/11/2023    Procedure: RIGHT FEMORAL TO POPLITEAL GRAFT THROMBECTOMY;  Surgeon: Chadwick Lozano MD;  Location:  OR    BYPASS GRAFT INSITU FEMOROPOPLITEAL Right 7/7/2021    Procedure: CREATION RIGHT FEMORAL TO POPLITEAL ARTERIAL BYPASS USING INSITU VEIN GRAFT;  Surgeon: José Luis Hernandez MD;  Location:  OR    CARDIAC CATHERIZATION  09/03/2009    multivessel CAD without target lesions, med mgmt indicated, preserved ef    CARPAL TUNNEL RELEASE RT/LT Right 05/20/2021    COLONOSCOPY N/A 12/12/2023    Procedure: Colonoscopy;  Surgeon: Corey Hoffman MD;  Location:  GI    COLONOSCOPY N/A 12/14/2023    Procedure: Colonoscopy;  Surgeon: Corey Hoffman MD;  Location:  GI    CV CORONARY ANGIOGRAM N/A 10/4/2023    Procedure: Coronary Angiogram;  Surgeon: Rogelio Ricks MD;  Location:  [facility]    CV PCI N/A 10/4/2023    Procedure: Percutaneous Coronary Intervention;  Surgeon: Rogelio Ricks MD;  Location:  [facility]    EMBOLECTOMY LOWER EXTREMITY Right 10/6/2023    Procedure: Right anterior tibial bypass with graft, Right tibial endarterectomy,thrombectomy, Right doraslis pedis thrombectomy, Anterior Tibial vein patch.;  Surgeon: Chadwick Lozano MD;  Location:  OR    ENDARTERECTOMY CAROTID Right 05/11/2016    Procedure: ENDARTERECTOMY CAROTID;  Surgeon: Chadwick Lozano MD;  Location:  OR    ENDARTERECTOMY CAROTID Left 06/08/2020    Procedure: LEFT CAROTID ENDARTERECTOMY with distal facal vein patch  and EEG;  Surgeon: Chadwick Lozano MD;  Location:  OR    ESOPHAGOSCOPY, GASTROSCOPY, DUODENOSCOPY (EGD), COMBINED N/A 12/12/2023    Procedure: Esophagoscopy, gastroscopy, duodenoscopy (EGD), combined;  Surgeon: Corey Hoffman MD;  Location:  GI    FINE NEEDLE ASPIRATION WITHOUT IMAGING GUIDANCE Right 9/22/2023    Procedure:  Fine needle aspiration without imaging guidance;  Surgeon: Kiersten Aguilera MD;  Location: [facility]    FLUORESCENCE INTRAOPERATIVE VASCULAR ANGIOGRAPHY Right 12/28/2022    Procedure: RIGHT LEG ANGIOGRAM with intervention;  Surgeon: Chadwick Lozano MD;  Location:  OR    GYN SURGERY  left tube    left salpingectomy    IR ANGIOGRAM THROUGH CATHETER (ARTERIAL)  10/6/2023    IR ANGIOGRAM THROUGH CATHETER (ARTERIAL)  10/6/2023    IR LOWER EXTREMITY ANGIOGRAM RIGHT  05/25/2021    IR LOWER EXTREMITY ANGIOGRAM RIGHT  10/5/2023    IR LOWER EXTREMITY ANGIOGRAM RIGHT  3/29/2024    IR OR ANGIOGRAM  6/23/2021    IR OR ANGIOGRAM  12/28/2022    LAPAROSCOPIC CHOLECYSTECTOMY N/A 09/27/2017    Procedure: LAPAROSCOPIC CHOLECYSTECTOMY;  LAPAROSCOPIC CHOLECYSTECTOMY;  Surgeon: Jacoby Tapia MD;  Location: [facility]    LAPAROSCOPY DIAGNOSTIC (GENERAL) N/A 8/11/2021    Procedure: Exploratory laparoscopy,  laparoscopic lysis of adhesions, laparotomy;  Surgeon: Corina Ferreira MD;  Location: [facility]    OR ANGIOGRAM, LOWER EXTREMITY Right 10/11/2023    Procedure: Or Angiogram, Lower Extremity;  Surgeon: Chadwick Lozano MD;  Location:  OR    ORTHOPEDIC SURGERY      left knee surgery    REPAIR ANEURYSM FEMORAL ARTERY Left 12/28/2022    Procedure: REPAIR LEFT FEMORAL PSEUDOANEURYSM;  Surgeon: Chadwick Lozano MD;  Location:  OR    VASCULAR SURGERY  aoto bi fem  left fem-AK pop    [facility] FABRIC WRAPPING OF ABDOMINAL ANEURYSM      [facility] NONSPECIFIC PROCEDURE  12/2000    angioplasty with stent right fem. a.    [facility] NONSPECIFIC PROCEDURE  1987    sinus surgery    [facility] NONSPECIFIC PROCEDURE  09/02/2009    Emergent left groin exploration with oversewing of bleeding angiographic site. 2. Endarterectomy of common femoral-proximal superficial femoral artery with greater saphenous vein patch angioplasty.   a. [place] of accessory right greater saphenous vein.     [facility] NONSPECIFIC PROCEDURE  08/27/2009    occluded left common  iliac and external iliac arteries were successfully revascularized with stenting to 8 and 7 mm        MEDICATIONS:    No current outpatient medications on file.       ALLERGIES:    Allergies as of 03/29/2024 - Reviewed 03/29/2024   Allergen Reaction Noted    Pantoprazole  03/19/2004    Chantix [varenicline]  02/21/2020    Contrast dye Swelling 10/19/2023    Penicillins Itching and Rash 09/19/2002       FH:    Family History   Problem Relation Age of Onset    Cancer Mother         bladder    Cardiovascular Father         alive,multiple heart attacks    Diabetes Maternal Grandmother     Lung Cancer Maternal Grandmother     Blood Disease Brother         clotting disorder       SH:    Social History     Socioeconomic History    Marital status:      Spouse name: Not on file    Number of children: Not on file    Years of education: Not on file    Highest education level: Not on file   Occupational History    Not on file   Tobacco Use    Smoking status: Some Days     Packs/day: 0.25     Years: 52.00     Additional pack years: 0.00     Total pack years: 13.00     Types: Cigarettes     Start date: 1968    Smokeless tobacco: Never    Tobacco comments:     1/2 PPD   Vaping Use    Vaping Use: Never used   Substance and Sexual Activity    Alcohol use: Not Currently     Comment: x1-2 yr    Drug use: Yes     Types: Marijuana     Comment: 2x per day    Sexual activity: Yes     Partners: Male     Birth control/protection: Surgical   Other Topics Concern    Parent/sibling w/ CABG, MI or angioplasty before 65F 55M? Not Asked   Social History Narrative    Not on file     Social Determinants of Health     Financial Resource Strain: Low Risk  (1/8/2024)    Financial Resource Strain     Within the past 12 months, have you or your family members you live with been unable to get utilities (heat, electricity) when it was really needed?: No   Food Insecurity: Low Risk  (1/8/2024)    Food Insecurity     Within the past 12 months, did  you worry that your food would run out before you got money to buy more?: No     Within the past 12 months, did the food you bought just not last and you didn t have money to get more?: No   Transportation Needs: Low Risk  (1/8/2024)    Transportation Needs     Within the past 12 months, has lack of transportation kept you from medical appointments, getting your medicines, non-medical meetings or appointments, work, or from getting things that you need?: No   Physical Activity: Not on file   Stress: Not on file   Social Connections: Not on file   Interpersonal Safety: Low Risk  (11/13/2023)    Interpersonal Safety     Do you feel physically and emotionally safe where you currently live?: Yes     Within the past 12 months, have you been hit, slapped, kicked or otherwise physically hurt by someone?: No     Within the past 12 months, have you been humiliated or emotionally abused in other ways by your partner or ex-partner?: No   Housing Stability: Low Risk  (1/8/2024)    Housing Stability     Do you have housing? : Yes     Are you worried about losing your housing?: No       PHYSICAL EXAM:      Vitals were reviewed  Patient Vitals for the past 8 hrs:   BP Temp Temp src Pulse Resp SpO2   04/01/24 1300 119/62 97.9  F (36.6  C) -- 72 15 93 %   04/01/24 1245 134/66 98.1  F (36.7  C) -- 77 14 92 %   04/01/24 1230 114/71 98.2  F (36.8  C) -- 73 16 93 %   04/01/24 1215 120/68 98.4  F (36.9  C) -- 75 -- 94 %   04/01/24 1205 -- 98.4  F (36.9  C) -- 74 -- 96 %   04/01/24 1200 (!) 144/69 98.4  F (36.9  C) -- 78 -- 96 %   04/01/24 1150 (!) 146/67 98.6  F (37  C) -- 81 -- 96 %   04/01/24 0956 (!) 160/71 98.6  F (37  C) -- -- 17 96 %   04/01/24 0900 137/68 98.8  F (37.1  C) -- 76 17 98 %   04/01/24 0800 123/68 98.8  F (37.1  C) Bladder 77 18 97 %   04/01/24 0700 (!) 164/87 97.5  F (36.4  C) -- 90 27 94 %     Wt Readings from Last 4 Encounters:   04/01/24 55.2 kg (121 lb 12.8 oz)   01/08/24 49.8 kg (109 lb 12.8 oz)   12/12/23 50.8  kg (112 lb)   11/21/23 50.6 kg (111 lb 8 oz)     I/O last 3 completed shifts:  In: 3755.58 [I.V.:2430.58; IV Piggyback:1000]  Out: 146 [Urine:146]      Vitals:    03/29/24 1431 03/29/24 1830 03/30/24 0600 03/31/24 0600   Weight: 49.9 kg (110 lb) 48 kg (105 lb 14.4 oz) 47.9 kg (105 lb 9.6 oz) 52.2 kg (115 lb 1.6 oz)    04/01/24 0600   Weight: 55.2 kg (121 lb 12.8 oz)     GENERAL: sedate post anesthesia  HEENT: FM O2  RESP:  clear anteriorly  CV: RRR, normal S1 S2  ABDOMEN: soft, nontender, no HSM or masses and bowel sounds normal  MS: no clubbing, cyanosis   SKIN: clear without significant rashes or lesions  EXT: BKA/ no edema       LABS:        Recent Labs   Lab 04/01/24  1347 04/01/24  0849 04/01/24  0539   NA  --   --  135   POTASSIUM  --   --  4.7   CHLORIDE  --   --  108*   CO2  --   --  13*   ANIONGAP  --   --  14   GLC 78   < > 104*   BUN  --   --  69.9*   CR  --   --  2.58*   GFRESTIMATED  --   --  20*   SONIA  --   --  5.7*    < > = values in this interval not displayed.     Recent Labs   Lab 04/01/24  0539 03/31/24  2338 03/31/24  0530 03/30/24  0500 03/29/24  1656   CR 2.58* 2.39* 1.61* 0.45* 0.67     No lab results found in last 7 days.  Recent Labs   Lab 03/31/24  2010   LACT 1.1     Recent Labs   Lab 04/01/24  0539 03/31/24  0051 03/30/24  1207 03/30/24  0500   HGB 7.9* 8.5* 8.2* 8.1*       DIAGNOSTICS:  Reviewed      LUIS M Barboza    [facility]  [phone]

## 2024-04-01 NOTE — ANESTHESIA PROCEDURE NOTES
Airway       Patient location during procedure: OR       Procedure Start/Stop Times: 4/1/2024 10:26 AM  Staff -        Anesthesiologist:  Vasquez Parnell MD       CRNA: Nelly Owens APRN CRNA       Performed By: CRNAIndications and Patient Condition       Indications for airway management: lydia-procedural       Induction type:intravenous       Mask difficulty assessment: 2 - vent by mask + OA or adjuvant +/- NMBA    Final Airway Details       Final airway type: endotracheal airway       Successful airway: ETT - single  Endotracheal Airway Details        ETT size (mm): 7.0       Cuffed: yes       Successful intubation technique: video laryngoscopy       VL Blade Size: Glidescope 3       Grade View of Cords: 1       Adjucts: stylet       Position: Right       Measured from: lips       Secured at (cm): 21       Bite block used: None    Post intubation assessment        Placement verified by: capnometry and equal breath sounds        Number of attempts at approach: 1       Number of other approaches attempted: 0       Secured with: tape       Ease of procedure: easy       Dentition: Intact and Unchanged    Medication(s) Administered   Medication Administration Time: 4/1/2024 10:26 AM

## 2024-04-01 NOTE — PROGRESS NOTES
RT called to assess pt for increased O2 needs, on 15 lpm oxymask. SpO2 85-87%, but poor waveform. BBS diminished, slightly coarse. ABG done to confirm hypoxia. Pt placed on HFNC 50L 90% FiO2 after abg draw. Will continue to follow.       RANDAL Perez, RRT

## 2024-04-01 NOTE — OP NOTE
Preoperative diagnosis:  1.  Sepsis due to right lower extremity nonsalvageable at the and progressive ischemia.  2.  Failed attempt at salvage of right common femoral artery to anterior tibial artery bypass.    Postoperative diagnosis: Same.    Procedure:  1.  Partial explantation of the right common femoral artery to anterior tibial artery bypass graft (PTFE).  2.  Transarticular guillotine right through the knee amputation.  3.  Application of wound VAC to right lower extremity's particular through the amputation site.    Surgeon: José Luis Hernandez M.D.   Assistant: Gala Morales M.D.     Anesthesia: General plus local.  Estimated blood loss: About 10 mL.  Complications: None.  Specimen: Right lower extremity.    Indication for procedure: This is a 63-year-old female who had a previous right common femoral artery to anterior tibial artery bypass which has failed.  Catheter directed thrombolysis has resulted in complications including gastrointestinal hemorrhage.  She is getting progressively septic from the right lower extremity nonviable tissues.  We are taking her to the operating room for guillotine amputation and partial explantation of the bypass graft.    Procedure details: Patient was identified and then taken to the operative room and placed in supine position.  General anesthesia was induced.  Preoperative intravenous antibiotics were administered.  Right lower extremity was prepped and a sterile surgical field was created.    Preprocedure timeout was conducted.  In the mid thigh we marked out the graft.  Local anesthetic with epinephrine was administered around the graft.  A 2 cm upper thigh incision was made to isolate the graft.  There was no pulsation of flow in the graft.  Graft was transected and the proximal end was suture-ligated with 0 Ethibond suture.  Local anesthetic was administered around the graft and the graft was freed up from the surrounding tissues.  It was well coagulated.  Then a  circular infrapatellar incision was made around the knee joint.  The graft was identified and transected at that point.  Then the graft was pulled out.  Then we continued with the through knee amputation.  Sharply the quadriceps tendon, medial collateral ligament, capsule of the knee joint, lateral collateral ligament and anterior posterior crucial ligaments and the posterior capsule were divided.  There was minimal bleeding.  The sciatic nerve was suture-ligated with 3-0 Vicryl suture.  The popliteal artery was suture-ligated with 0 Ethibond.  Again there was minimal bleeding through any of these tissues.  10 mL of lidocaine was injected into the sciatic nerve.    The upper thigh incision was closed in layers with 3-0 Vicryl and 4-0 Monocryl.  Wound VAC was applied to the open amputation site.    Consult instruments, sponges and needle was noted to be correct.    Patient was awakened and extubated and taken to the recovery room in critical but stable condition.

## 2024-04-01 NOTE — PROGRESS NOTES
VASCULAR SURGERY    Comfortable this morning.  He is sensate right leg from the knee distally with ischemia.  Alert on high flow oxygen breathing easily  Right femoral site= stable resolving hematoma (covered stent placed in right limb of aortofemoral graft due to bleeding from attempted access).    Left groin access site=A  +3 left graft pulse.  Normal left leg CMS    SCr= 2.58 (baseline normal and likely due to rhabdomyolysis)  Hgb= 7.9  (No signs of active GI bleeding)--LIMA    PLAN: Open guillotine right above-knee amputation today with Dr. Hernandez.  Patient understands and agrees.  Not require anticoagulation except for Plavix postprocedure (Xarelto was to keep right graft patent)      Chadwick Lozano MD

## 2024-04-01 NOTE — PROCEDURES
[facility]    Double Lumen PICC Placement    Date/Time: 3/31/2024 7:21 PM    Performed by: Mikey Marcus RN  Authorized by: Marcin White MD  Indications: vascular access      UNIVERSAL PROTOCOL   Site Marked: Yes  Prior Images Obtained and Reviewed:  Yes  Required items: Required blood products, implants, devices and special equipment available    Patient identity confirmed:  Verbally with patient, arm band and hospital-assigned identification number  NA - No sedation, light sedation, or local anesthesia  Confirmation Checklist:  Patient's identity using two indicators, relevant allergies, procedure was appropriate and matched the consent or emergent situation and correct equipment/implants were available  Time out: Immediately prior to the procedure a time out was called    Universal Protocol: the Joint Commission Universal Protocol was followed    Preparation: Patient was prepped and draped in usual sterile fashion    ESBL (mL):  2     ANESTHESIA    Anesthesia:  Local infiltration  Local Anesthetic:  Lidocaine 1% without epinephrine  Anesthetic Total (mL):  2      SEDATION    Patient Sedated: No        Preparation: skin prepped with 2% chlorhexidine  Skin prep agent: skin prep agent completely dried prior to procedure  Sterile barriers: maximum sterile barriers were used: cap, mask, sterile gown, sterile gloves, and large sterile sheet  Hand hygiene: hand hygiene performed prior to central venous catheter insertion  Type of line used: PICC  Catheter type: double lumen  Lumen type: valved  Lumen Identification: Red and Purple  Catheter size: 5 Fr  Brand: Bard  Lot number: [id number]  Placement method: venipuncture, MST, ultrasound and tip navigation system  Number of attempts: 1  Difficulty threading catheter: no  Successful placement: yes  Orientation: right    Location: brachial vein (lateral)  Tip Location: SVC  Arm circumference: adults 10 cm  Extremity circumference: 27  Visible catheter  length: 5  Total catheter length: 35  Dressing and securement: adhesive securement device, blood cleaned with CHG, blood removed, chlorhexidine disc applied, dressing applied, hemostatic agent, occlusive dressing applied, statlock, securement device, sterile dressing applied and transparent securement dressing  Post procedure assessment: blood return through all ports and placement verified by 3CG technology  Complications: none.  PROCEDURE   Patient Tolerance:  Patient tolerated the procedure well with no immediate complicationsDescribe Procedure: 2L PICC line placed successfully with good blood return from both lumens and flushed with NS w/o difficulty. Surgicele applied d/t bleeding. Tip SVC as confirmed by 3CG tech. Ready to use.  Disposal: sharps and needle count correct at the end of procedure, needles and guidewire disposed in sharps container

## 2024-04-01 NOTE — PROGRESS NOTES
"VASCULAR SURGERY PROGRESS NOTE    Subjective:  Noted to have acidosis with rising CK overnight and increased oxygen requirements.  Denies any difficulty breathing this morning but also notes no change in motor or sensation in right leg.  Unable to tolerate tPA, continues with no movement of the knee and below and right lower extremity.    Objective:  Intake/Output Summary (Last 24 hours) at 4/1/2024 0715  Last data filed at 4/1/2024 0400  Gross per 24 hour   Intake 3505.58 ml   Output 146 ml   Net 3359.58 ml     PHYSICAL EXAM:  /84   Pulse 76   Temp 98.2  F (36.8  C)   Resp 16   Ht 1.549 m (5' 1\")   Wt 52.2 kg (115 lb 1.6 oz)   LMP  (LMP Unknown)   SpO2 98%   BMI 21.75 kg/m    General: The patient is alert and oriented. Appropriate. No acute distress  Psych: pleasant affect, answers questions appropriately  Skin: Color appropriate for race, warm, dry.  Respiratory: The patient does require supplemental oxygen. Breathing unlabored on high flow nasal cannula this morning.  GI:  Abdomen soft, nontender to light palpation.  Extremities: Right leg is mottled to knee, no motor in the knee or ankle, no sensation below the knee, no signals in right foot.  Left leg without acute issue, palpable graft pulse in left leg and pulses noted in left foot.  2+ common femoral artery pulse bilaterally, no groin hematoma or pseudoaneurysm noted bilaterally.    Labs:  Na 135  K 4.7  Cr 2.58 from 0.45 on admission  CK 9220 from 1963 on admission  WBC 4.0  Hgb 7.9  Platelet 62      Imaging:   Angiograms reviewed:   3/30: Active extravasation from right common femoral artery, Viabahn covered stent placement noted with further embolization of common femoral artery branch.  Patent bypass but very minimal runoff  3/31: Patent bypass, continued minimal outflow.    ASSESSMENT:  63-year-old female well-known to the vascular surgery team with hx of:    2/15/2022: Distal popliteal artery occlusion with jump graft to distal " popliteal/tibial arteries  5/6/2023: Right distal femoral to popliteal graft revision with cadaveric SFA to mid anterior tibial artery  10/7/2023: Emergent right femoral and anterior tibial thrombectomy.  Right femoral to anterior tibial Propaten 6 mm PTFE bypass (distal graft anastomosed to remaining several centimeters of cadaveric SFA graft ). graft anterior tibial enterectomy and vein patch angioplasty.  Dorsalis pedis thrombectomy   10/11/2023: Emergency right femoral to anterior tibial PTFE bypass graft thrombectomy with anterior tibial thrombectomy  1/4/24: Widely patent right leg bypass graft on duplex @office visit    Patient presented with acute occlusion of right fem to anterior tibial PTFE/cadaveric SFA graft.  Initial heparin and lytics with angiogram notable for patent bypass but bleeding in groin resolved with Viabahn stent placement and embolization of side branch but with persistent decreased outflow and inability to tolerate further lytics due to continued hypofibrinogenemia, hematochezia, and worsening clinical status of right foot.    PLAN:  -Will plan for guillotine AKA today  -platelets decreased to 62, will send KATHY panel. Heparin held.   -Continue respiratory support with nasal cannula as needed  -Continue IV fluids per nephrology recommendation, follow up for full consult today. Will monitor renal function which has significantly worsened with a creatinine of 2.58 today. No signs of fluid overload this AM.   -cont cefepime and vanc  -home BP meds restarted  -cont bedrest  -cont NPO  -will need ASA 81mg when platelets improve as she does have bypass in LLE  -appreciate medical team for co management in care    Discussed pt history, exam, assessment and plan with Dr. Hernandez of the vascular surgery service, who is in agreement with the above.    Gala Morales DO, PA-C  VASCULAR SURGERY

## 2024-04-01 NOTE — ANESTHESIA POSTPROCEDURE EVALUATION
Patient: Shirley Hendricks    Procedure: Procedure(s):  AMPUTATION, ABOVE KNEE right leg with wound vac dressing, excision of anteriotibial bypass graft, closure of (previous interventional radiology) left groin incision       Anesthesia Type:  General    Note:  Disposition: ICU; Inpatient   Postop Pain Control: Uneventful            Sign Out: Well controlled pain   PONV: No   Neuro/Psych: Uneventful            Sign Out: Acceptable/Baseline neuro status   Airway/Respiratory: Uneventful            Sign Out: Acceptable/Baseline resp. status; O2 supplementation               Oxygen: Face mask   CV/Hemodynamics: Uneventful            Sign Out: Acceptable CV status   Other NRE: NONE   DID A NON-ROUTINE EVENT OCCUR? No    Event details/Postop Comments:  Respiratory status improved slightly following general anesthetic. Myself and CRNA were able to pass soft suction catheter through endotracheal tube and into posterior pharynx to remove thick secretions. Extubated uneventfully to oxymask at 10L O2. Able to wean O2 slightly before transfer back to ICU. Patient no longer requiring high flow O2 at time of transfer.           Last vitals:  Vitals Value Taken Time   /62 04/01/24 1300   Temp 36.6  C (97.88  F) 04/01/24 1303   Pulse 75 04/01/24 1303   Resp 15 04/01/24 1300   SpO2 93 % 04/01/24 1303   Vitals shown include unfiled device data.    Electronically Signed By: Vasquez Parnell MD  April 1, 2024  2:22 PM

## 2024-04-01 NOTE — PHARMACY-VANCOMYCIN DOSING SERVICE
Pharmacy Vancomycin Note  Date of Service 2024  Patient's  1960   63 year old, female    Indication: Skin and Soft Tissue Infection  Day of Therapy: 2  Current vancomycin regimen:  per levels  Current vancomycin monitoring method: Trough (Method 2 = manual dose calculation)  Current vancomycin therapeutic monitoring goal: 15-20 mg/L    Current estimated CrCl = Estimated Creatinine Clearance: 18.9 mL/min (A) (based on SCr of 2.58 mg/dL (H)).    Creatinine for last 3 days  3/29/2024:  4:56 PM Creatinine 0.67 mg/dL  3/30/2024:  5:00 AM Creatinine 0.45 mg/dL  3/31/2024:  5:30 AM Creatinine 1.61 mg/dL; 11:38 PM Creatinine 2.39 mg/dL  2024:  5:39 AM Creatinine 2.58 mg/dL    Recent Vancomycin Levels (past 3 days)  2024:  5:39 AM Vancomycin 13.4 ug/mL    Vancomycin IV Administrations (past 72 hours)                     vancomycin (VANCOCIN) 1,000 mg in 200 mL dextrose intermittent infusion (mg) 1,000 mg New Bag 24 0947                    Nephrotoxins and other renal medications (From now, onward)      Start     Dose/Rate Route Frequency Ordered Stop    24 0800  vancomycin (VANCOCIN) 1,250 mg in 0.9% NaCl 250 mL intermittent infusion         1,250 mg  over 90 Minutes Intravenous ONCE 24 0749      24 1115  vancomycin place freire - receiving intermittent dosing         1 each Intravenous SEE ADMIN INSTRUCTIONS 24 1115      24 0900  [Held by provider]  empagliflozin (JARDIANCE) tablet 10 mg        (On hold since today at 0058 until manually unheld; held by Mono Barrientos APRN CNPHold Reason: Acute Kidney Injury)   Note to Pharmacy: PTA Sig:Take 1 tablet (10 mg) by mouth daily      10 mg Oral DAILY 24 1840                 Contrast Orders - past 72 hours (72h ago, onward)      Start     Dose/Rate Route Frequency Stop    24 1400  iodixanol (VISIPAQUE 320) injection 50 mL         50 mL INTRA-ARTERIAL ONCE 24 1353    24 1330  iopamidol  (ISOVUE-300) IV solution 61% 100 mL  Status:  Discontinued         100 mL INTRA-ARTERIAL ONCE 03/31/24 1453    03/30/24 1200  iodixanol (VISIPAQUE 320) injection 100 mL         100 mL Arterial ONCE 03/30/24 1435    03/29/24 2100  iodixanol (VISIPAQUE 320) injection 100 mL         100 mL INTRA-ARTERIAL ONCE 03/29/24 2241            Interpretation of levels and current regimen:  Vancomycin level is reflective of subtherapeutic level    Has serum creatinine changed greater than 50% in last 72 hours: Yes    Urine output:  diminished urine output    Renal Function: Worsening    Plan:  Give 1250mg (22mg/kg) x 1  Vancomycin monitoring method: Trough (Method 2 = manual dose calculation)  Vancomycin therapeutic monitoring goal: 15-20 mg/L  Pharmacy will check vancomycin levels as appropriate in  in AM .  Serum creatinine levels will be ordered daily for the first week of therapy and at least twice weekly for subsequent weeks.    Jelena Pedraza, [facility], PharmD

## 2024-04-01 NOTE — PROGRESS NOTES
Vascular note-    Spoke with daughter Miriam at bedside regarding plan for urgent guillotine right above knee amputation. We discussed the hospital course over the past 48 hours as well as risks, benefits, and alternatives to the procedure today. She voiced understanding and wishes with procedure as planned.    Gala Morales DO  Fellow

## 2024-04-01 NOTE — ANESTHESIA PREPROCEDURE EVALUATION
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 clot    Post-menopausal     Reflux esophagitis 2004    EGD: esophagitis and medium HH    SBO (small bowel obstruction) (H) 08/10/2021    SVT (supraventricular tachycardia)     Thrombocytopenia (H24)     Uncomplicated asthma     Vitamin C deficiency 2018      Past Surgical History:   Procedure Laterality Date    ANGIOGRAM      ANGIOGRAM Right 2021    Procedure: RIGHT LOWER EXTREMITY ANGIOGRAM WITH LEFT BRACHIAL ARTERY CUTDOWN;  Surgeon: José Luis Hernandez MD;  Location:  OR    BIOPSY MASS NECK Right 10/11/2023    Procedure: Right Parotid Biopsy;  Surgeon: Randal Mendoza MD;  Location:  OR    BYPASS GRAFT FEMOROPOPLITEAL Right 2020    Procedure: RIGHT FEMORAL GRAFT TO ABOVE-KNEE POPLITEAL BYPASS USING CADAVERIC SUPERFICIAL FEMORAL ARTERY;  Surgeon: Chadwick Lozano MD;  Location: [facility] OR    BYPASS GRAFT FEMOROPOPLITEAL Right 2/15/2022    Procedure: RIGHT SUPERFICIAL FEMORAL ARTERY GRAFT TO BELOW KNEE  POPLITEAL BYPASS WITH CADAVERIC CRYOLIFE  FEMORAL-POPLITEAL ARTERY SIZE: OUTER DIAMETER: 7MM - 6MM;  Surgeon: Chadwick Lozano;  Location:  OR    BYPASS GRAFT FEMOROPOPLITEAL Right 5/26/2023    Procedure: RIGHT DISTAL SUPERFICIAL FEMORAL GRAFT TO ANTERIOR TIBIAL ARTERY WITH 26 CENTIMETER CADAVERIC SUPERFICIAL FEMORAL ARTERY GRAFT;  Surgeon: Chadwick Lozano MD;  Location:  OR    BYPASS GRAFT FEMOROPOPLITEAL Right 10/11/2023    Procedure: RIGHT FEMORAL TO POPLITEAL GRAFT THROMBECTOMY;  Surgeon: Chadwick Lozano MD;  Location:  OR    BYPASS GRAFT INSITU FEMOROPOPLITEAL Right 7/7/2021    Procedure: CREATION RIGHT FEMORAL TO POPLITEAL ARTERIAL BYPASS USING INSITU VEIN GRAFT;  Surgeon: José Luis Hernandez MD;  Location:  OR    CARDIAC CATHERIZATION  09/03/2009    multivessel CAD without target lesions, med mgmt indicated, preserved ef    CARPAL TUNNEL RELEASE RT/LT Right 05/20/2021    COLONOSCOPY N/A 12/12/2023    Procedure: Colonoscopy;  Surgeon: Corey Hoffman MD;  Location:  GI    COLONOSCOPY N/A 12/14/2023    Procedure: Colonoscopy;  Surgeon: Corey Hoffman MD;  Location:  GI    CV CORONARY ANGIOGRAM N/A 10/4/2023    Procedure: Coronary Angiogram;  Surgeon: Rogelio Ricks MD;  Location:  [facility] C[facility] CATH LAB    CV PCI N/A 10/4/2023    Procedure: Percutaneous Coronary Intervention;  Surgeon: Rogelio Ricks MD;  Location:  HEART CARD[facility] CATH LAB    EMBOLECTOMY LOWER EXTREMITY Right 10/6/2023    Procedure: Right anterior tibial bypass with graft, Right tibial endarterectomy,thrombectomy, Right doraslis pedis thrombectomy, Anterior Tibial vein patch.;  Surgeon: Chadwick Lozano MD;  Location:  OR    ENDARTERECTOMY CAROTID Right 05/11/2016    Procedure: ENDARTERECTOMY CAROTID;  Surgeon: Chadwick Lozano MD;  Location:  OR    ENDARTERECTOMY CAROTID Left 06/08/2020    Procedure: LEFT CAROTID ENDARTERECTOMY with distal facal vein  patch  and EEG;  Surgeon: Chadwick Lozano MD;  Location:  OR    ESOPHAGOSCOPY, GASTROSCOPY, DUODENOSCOPY (EGD), COMBINED N/A 12/12/2023    Procedure: Esophagoscopy, gastroscopy, duodenoscopy (EGD), combined;  Surgeon: Corey Hoffman MD;  Location:  GI    FINE NEEDLE ASPIRATION WITHOUT IMAGING GUIDANCE Right 9/22/2023    Procedure: Fine needle aspiration without imaging guidance;  Surgeon: Kiersten Aguilera MD;  Location:  GI    FLUORESCENCE INTRAOPERATIVE VASCULAR ANGIOGRAPHY Right 12/28/2022    Procedure: RIGHT LEG ANGIOGRAM with intervention;  Surgeon: Chadwick Lozano MD;  Location:  OR    GYN SURGERY  left tube    left salpingectomy    IR ANGIOGRAM THROUGH CATHETER (ARTERIAL)  10/6/2023    IR ANGIOGRAM THROUGH CATHETER (ARTERIAL)  10/6/2023    IR LOWER EXTREMITY ANGIOGRAM RIGHT  05/25/2021    IR LOWER EXTREMITY ANGIOGRAM RIGHT  10/5/2023    IR LOWER EXTREMITY ANGIOGRAM RIGHT  3/29/2024    IR OR ANGIOGRAM  6/23/2021    IR OR ANGIOGRAM  12/28/2022    LAPAROSCOPIC CHOLECYSTECTOMY N/A 09/27/2017    Procedure: LAPAROSCOPIC CHOLECYSTECTOMY;  LAPAROSCOPIC CHOLECYSTECTOMY;  Surgeon: Jacoby Tapia MD;  Location: [facility]    LAPAROSCOPY DIAGNOSTIC (GENERAL) N/A 8/11/2021    Procedure: Exploratory laparoscopy,  laparoscopic lysis of adhesions, laparotomy;  Surgeon: Corina Ferreira MD;  Location:  OR    OR ANGIOGRAM, LOWER EXTREMITY Right 10/11/2023    Procedure: Or Angiogram, Lower Extremity;  Surgeon: Chadwick Lozano MD;  Location:  OR    ORTHOPEDIC SURGERY      left knee surgery    REPAIR ANEURYSM FEMORAL ARTERY Left 12/28/2022    Procedure: REPAIR LEFT FEMORAL PSEUDOANEURYSM;  Surgeon: Chadwick Lozano MD;  Location:  OR    VASCULAR SURGERY  aoto bi fem  left fem-AK pop    ZZC FABRIC WRAPPING OF ABDOMINAL ANEURYSM      [facility] NONSPECIFIC PROCEDURE  12/2000    angioplasty with stent right fem. a.    C NONSPECIFIC PROCEDURE  1987    sinus surgery    [facility]  NONSPECIFIC PROCEDURE  09/02/2009    Emergent left groin exploration with oversewing of bleeding angiographic site. 2. Endarterectomy of common femoral-proximal superficial femoral artery with greater saphenous vein patch angioplasty.   a. [place] of accessory right greater saphenous vein.     [facility] NONSPECIFIC PROCEDURE  08/27/2009    occluded left common iliac and external iliac arteries were successfully revascularized with stenting to 8 and 7 mm       Allergies   Allergen Reactions    Pantoprazole      Protonix caused diffuse edema    Chantix [Varenicline]      Terrible dreams    Contrast Dye Swelling     Patient reports facial and throat swelling with prior CT contrast.    Penicillins Itching and Rash      Social History     Tobacco Use    Smoking status: Some Days     Packs/day: 0.25     Years: 52.00     Additional pack years: 0.00     Total pack years: 13.00     Types: Cigarettes     Start date: 1968    Smokeless tobacco: Never    Tobacco comments:     1/2 PPD   Substance Use Topics    Alcohol use: Not Currently     Comment: x1-2 yr      Wt Readings from Last 1 Encounters:   04/01/24 55.2 kg (121 lb 12.8 oz)        Anesthesia Evaluation   Pt has had prior anesthetic. Type: General.    No history of anesthetic complications       ROS/MED HX  ENT/Pulmonary:     (+)           allergic rhinitis,     tobacco use, Current use,     asthma    COPD, O2 dependent,          (-) sleep apnea   Neurologic:    (-) no CVA and no TIA   Cardiovascular: Comment: svt    (+) Dyslipidemia hypertension- Peripheral Vascular Disease-  CAD - past MI - -   Taking blood thinners   CHF                  dysrhythmias, Other,        Previous cardiac testing   Echo: Date: 11/2023 Results:  Limited Echo Adult.     ______________________________________________________________________________  Interpretation Summary     The left ventricle is normal in structure, function and size.  The visual ejection fraction is 55-60%.  The right ventricle is  normal in structure, function and size.  There is moderate trileaflet aortic sclerosis.  ______________________________________________________________________________  Left Ventricle  The left ventricle is normal in structure, function and size. There is normal  left ventricular wall thickness. Left ventricular systolic function is normal.  The visual ejection fraction is 55-60%. Normal left ventricular wall motion.     Right Ventricle  The right ventricle is normal in structure, function and size.     Atria  Normal left atrial size. Right atrial size is normal. There is no color  Doppler evidence of an atrial shunt.     Mitral Valve  The mitral valve leaflets appear thickened, but open well. There is trace  mitral regurgitation.     Tricuspid Valve  The tricuspid valve is normal in structure and function. There is trace  tricuspid regurgitation. Right ventricular systolic pressure could not be  approximated due to inadequate tricuspid regurgitation.     Aortic Valve  There is moderate trileaflet aortic sclerosis. No aortic regurgitation is  present.     Pulmonic Valve  The pulmonic valve is not well seen, but is grossly normal.     Pericardium  There is no pericardial effusion.     Rhythm  Sinus rhythm was noted.      Stress Test:  Date:  Results:  Name: KASIA WAN  MRN: [id number]  : 1960  Study Date: 2021 12:57 PM  Age: 60 yrs  Gender: Female  Patient Location: [facility]  Reason For Study: Light headedness  Ordering Physician: JACKIE DOWNEY  Referring Physician: Vasquez Benoit  Performed By: Corey James RDCS     BSA: 1.5 m2  Height: 62 in  Weight: 112 lb  HR: 57  BP: 140/78 mmHg  ______________________________________________________________________________  Procedure  Stress Echo Complete.     ______________________________________________________________________________  Interpretation Summary     Suboptimal stress test due to inadequate maximum heart rate.  Normal left  ventricular function and wall motion at rest and post-stress but  LV size and function were unchanged following limited exercise of 3 minutes.  Post-exercise images were obtained with the heart rate in the 70's bpm.  Blood pressure seth appropriately from 140/78 mmHg (supine, baseline) to  158/80 mmHg during Stage I and fell to 134/74 mmHg in recovery. Consider  Lexiscan stress imaging (nuclear, MRI) for adequate testing.  ______________________________________________________________________________  Stress  Exercise was stopped due to fatigue.  There was a normal BP response to exercise.  Target Heart Rate was not achieved due to fatigue.  Suboptimal stress test due to inadequate maximum heart rate.  J point/ST elevation at rest pseudonormalized with exercise.  There was no arrhythmia.  The Duke treadmill score was intermediate risk ( -11< Sterling score <5).  Normal left ventricular function and wall motion at rest and post-stress.  The visual ejection fraction is estimated at 60-65%.     Baseline  The patient is in normal sinus rhythm.  Baseline ECG displays Q waves in the mike-septal leads.  Elevated J point V3-V6, inferior leads - most C/W early repolarization.  Normal left ventricular function and wall motion at rest and post-stress.  The visual ejection fraction is estimated at 60-65%.     Stress Results         Protocol:  Eliezer Protocol        Maximum Predicted HR:   158 bpm         Target HR: 134 bpm               % Maximum Predicted HR: 66 %        Stage  DurationHeart Rate  BP                    Comment             (mm:ss)   (bpm)    Stage 1   3:00      104   158/80Patient requested to stop   RecoveryR  4:00      56    134/74RPP = 01490, Sterling = 2, FAC = Below average             Stress Duration:   3:00 mm:ss        Recovery Time: 4:00 mm:ss          Maximum Stress HR: 104 bpm           METS:          4     Mitral Valve  There is no mitral regurgitation noted.     Tricuspid Valve  No tricuspid  regurgitation.     Aortic Valve  No aortic regurgitation is present. No hemodynamically significant valvular  aortic stenosis.  ______________________________________________________________________________  MMode/2D Measurements & Calculations  IVSd: 1.00 cm     LVIDd: 4.0 cm  LVIDs: 2.8 cm  LVPWd: 0.73 cm  ECG Reviewed:  Date: Results:    Cath:  Date: 10/4/23 Results:     Prox RCA lesion is 45% stenosed.     RPAV-1 lesion is 30% stenosed.     RPAV-2 lesion is 50% stenosed.     1st Mrg lesion is 45% stenosed.     1st Diag lesion is 100% stenosed.     Dist Cx lesion is 55% stenosed.     1. Right dominant coronary anatomy.  2. Completely occluded (probably chronic) D1 with left to left collaterals from distal LAD to D1.  3. Moderate coronary artery disease involving proximal to mid RCA which is not hemodynamically significant-IFR negative.  4. Moderate coronary disease involving distal small-caliber RPL and distal circumflex artery.   (-) valvular problems/murmurs   METS/Exercise Tolerance: 3 - Able to walk 1-2 blocks without stopping    Hematologic: Comments: Lupus; thrombocytopenia    (+) History of blood clots,     anemia, history of blood transfusion,         Musculoskeletal: Comment: Charcot Breonna Tooth - neg musculoskeletal ROS     GI/Hepatic:     (+) GERD,                (-) liver disease   Renal/Genitourinary:    (-) renal disease   Endo:    (-) Type II DM, thyroid disease and obesity   Psychiatric/Substance Use:     (+) psychiatric history anxiety and depression   Recreational drug usage: Cannabis.    Infectious Disease:  - neg infectious disease ROS     Malignancy:  - neg malignancy ROS     Other:  - neg other ROS          Physical Exam    Airway        Mallampati: II   TM distance: > 3 FB   Neck ROM: full   Mouth opening: > 3 cm    Respiratory Devices and Support     High Flow Nasal Canula      Dental       (+) Modest Abnormalities - crowns, retainers, 1 or 2 missing teeth      Cardiovascular           Rhythm and rate: regular     Pulmonary   pulmonary exam normal        (+) decreased breath sounds           OUTSIDE LABS:  CBC:   Lab Results   Component Value Date    WBC 4.0 04/01/2024    WBC 10.7 03/31/2024    HGB 7.9 (L) 04/01/2024    HGB 8.5 (L) 03/31/2024    HCT 23.1 (L) 04/01/2024    HCT 26.4 (L) 03/31/2024    PLT 62 (L) 04/01/2024     (L) 03/31/2024     BMP:   Lab Results   Component Value Date     04/01/2024     (L) 03/31/2024    POTASSIUM 4.7 04/01/2024    POTASSIUM 5.1 03/31/2024    CHLORIDE 108 (H) 04/01/2024    CHLORIDE 109 (H) 03/31/2024    CO2 13 (L) 04/01/2024    CO2 12 (L) 03/31/2024    BUN 69.9 (H) 04/01/2024    BUN 64.9 (H) 03/31/2024    CR 2.58 (H) 04/01/2024    CR 2.39 (H) 03/31/2024    GLC 91 04/01/2024     (H) 04/01/2024     COAGS:   Lab Results   Component Value Date    PTT 76 (H) 12/15/2023    INR 1.38 (H) 10/07/2023    FIBR 185 03/31/2024     POC:   Lab Results   Component Value Date    BGM 87 07/08/2021     HEPATIC:   Lab Results   Component Value Date    ALBUMIN 2.9 (L) 10/07/2023    PROTTOTAL 4.3 (L) 10/07/2023    ALT 15 10/07/2023    AST 18 10/07/2023    ALKPHOS 35 10/07/2023    BILITOTAL 0.2 10/07/2023     OTHER:   Lab Results   Component Value Date    PH 7.19 (LL) 03/31/2024    LACT 1.1 03/31/2024    A1C 5.2 01/08/2024    SONIA 5.7 (LL) 04/01/2024    PHOS 4.0 07/09/2021    MAG 1.9 10/14/2023    LIPASE 132 08/10/2021    AMYLASE 46 08/14/2017    TSH 0.77 01/05/2021    T4 1.19 03/28/2017    CRP <2.9 09/18/2014    SED 39 (H) 11/13/2023       Anesthesia Plan    ASA Status:  4    NPO Status:  NPO Appropriate    Anesthesia Type: General.     - Airway: ETT   Induction: Intravenous, Propofol.   Maintenance: Balanced.   Techniques and Equipment:     - Airway: Video-Laryngoscope     - Lines/Monitors: 2nd IV     Consents    Anesthesia Plan(s) and associated risks, benefits, and realistic alternatives discussed. Questions answered and patient/representative(s) expressed  understanding.     - Discussed:     - Discussed with:  Patient       Use of blood products discussed: Yes.     - Discussed with: Patient.     Postoperative Care    Pain management: IV analgesics.   PONV prophylaxis: Ondansetron (or other 5HT-3), Dexamethasone or Solumedrol, Background Propofol Infusion     Comments:               Vasquez Parnell MD    I have reviewed the pertinent notes and labs in the chart from the past 30 days and (re)examined the patient.  Any updates or changes from those notes are reflected in this note.

## 2024-04-01 NOTE — PROGRESS NOTES
Vascular note:    Post op check POD#0 right through knee guillotine amputation. Minimal bleeding from soft tissue, no output in vac. Pt continues to remain very lethargic with supplemental oxygen. LIMA noted with continued dark urine due to rhabdo, nephrology following. Will continue vac to RLE and BMP q6 to monitor electrolytes closely.     No other changes to care. Daughter Malu updated on phone.     Gala Morales,   Fellow

## 2024-04-01 NOTE — PROGRESS NOTES
SPIRITUAL HEALTH SERVICES Consult Note  [facility]  ICU    Pt in OR for procedure. Will follow-up with pt after post-surgical transfer to ICU.    Levy Bravo  Associate nikko Molina Spiritual [facility] Phone Line [phone]  Spiritual Health Pager [phone]    [facility] available 24/7 for emergent requests/referrals, either by paging the on-call  or by entering an ASAP/STAT consult in [facility], which will also page the on-call .

## 2024-04-01 NOTE — BRIEF OP NOTE
[facility]    Brief Operative Note    Pre-operative diagnosis: Arterial occlusion [I70.90]  Post-operative diagnosis Same as pre-operative diagnosis    Procedure: AMPUTATION, ABOVE KNEE, Right - Leg    Surgeon: Surgeon(s) and Role:     * José Luis Hernandez MD - Primary     * Gala Morales DO - Fellow - Assisting  Anesthesia: General   Estimated Blood Loss: 5 mL from 4/1/2024 10:12 AM to 4/1/2024 11:47 AM      Drains: Wound vac to right knee stump  Specimens:   ID Type Source Tests Collected by Time Destination   1 : right leg amputation at knee Tissue Leg, Right SURGICAL PATHOLOGY EXAM José Luis Hernandez MD 4/1/2024 11:17 AM    A : right anteriotibial ptfe bypass graft Tissue Leg, Right ANAEROBIC BACTERIAL CULTURE ROUTINE, GRAM STAIN, AEROBIC BACTERIAL CULTURE ROUTINE José Luis Hernandez MD 4/1/2024 11:13 AM      Findings:   Minimal bleeding from soft tissue around right knee at through knee infrapatellar amputation site. Explantation of thrombosed PTFE graft from mid thigh to distal leg. Closure of access site in left groin with monocryl and chromic suture . Strong 2+ CFA bilaterally at conclusion of case.   Complications: None.  Implants: * No implants in log *

## 2024-04-01 NOTE — PLAN OF CARE
Problem: Adult Inpatient Plan of Care  Goal: Optimal Comfort and Wellbeing  Outcome: Not Progressing  Intervention: Monitor Pain and Promote Comfort  Recent Flowsheet Documentation  Taken 3/31/2024 2000 by Arlyn Escalante RN  Pain Management Interventions: pain pump in use  Intervention: Provide Person-Centered Care  Recent Flowsheet Documentation  Taken 4/1/2024 0400 by Arlyn Escalante RN  Trust Relationship/Rapport:   care explained   choices provided   empathic listening provided   emotional support provided   questions answered   questions encouraged   reassurance provided   thoughts/feelings acknowledged  Taken 4/1/2024 0000 by Arlyn Escalante RN  Trust Relationship/Rapport:   care explained   choices provided   empathic listening provided   emotional support provided   questions answered   questions encouraged   reassurance provided   thoughts/feelings acknowledged  Taken 3/31/2024 2000 by Arlyn Escalante RN  Trust Relationship/Rapport:   care explained   choices provided   empathic listening provided   emotional support provided   questions answered   questions encouraged   reassurance provided   thoughts/feelings acknowledged   Goal Outcome Evaluation:      Plan of Care Reviewed With: patient    Overall Patient Progress: no change    Outcome Evaluation: Patient somnolent/lethargic overnight, more alert early morning. She continues on HLNC 70% 50L since RRT. Left leg/foot remains cold, no pulses to left foot unchanged.  Anuric overnight; MD ware. Has PCA pump for pain management. NPO for  possible Rt. AKA today.

## 2024-04-01 NOTE — CODE/RAPID RESPONSE
[facility]    House BRANDON RRT Note  3/31/2024   Time Called: 1958    RRT called for: Hypoxia    Assessment & Plan     Acute hypoxic respiratory failure suspect 2/2 acidosis in setting of ongoing decompensation of ischemic RLE.  Worsening LIMA with oliguria 2/2 rhabdomyolysis in setting of ischemic RLE.  Worsening rhabdomyolysis 3/3 ischemic RLE.  Severe hypocalcemia.   Metabolic acidosis 2/2 ischemic RLE, rhabdomyolysis, LIMA.  - Upon arrival, pt lying in bed, eyes closed, in no overt distress with VS noting SBP 130s, HR 80s, RR low 20s, O2 sats 88% on 15+L O2 via oxymask, temp 99.  Pt sluggishly opens eyes to voice, reports no SOB, states improved abdominal pain and distention, notes RLE pain.      INTERVENTIONS:  - Stat ABG  - Stat lactic acid  - Stat CXR  - Will place pt on HFNC  - 1L NS bolus over 2 hours  - Placed PTA jardiance on hold  - Requested for nursing to bladder scan pt to ensure functioning leger catheter  - Updated pt's sister, friend at bedside and pt's daughter on pt's sister's phone  - Continues on cefepime, vancomycin    At the end of the RRT pt resting in bed, on 90% FiO2, 50L via HFNC    Addendum: 0023: Contacted by nursing via [email or web address] noting pt's pH 7.15, calcium 5.  Will order 2 g IV calcium now.  Discussed pt with Dr. Paulino, hospitalist at this time; notes concern for compartment syndrome in setting of progressive rhabdomyolysis, recommends discussion with vascular surgery.  Also noted ongoing acidosis, recommends discussion with nephrology  - 0044: Dr. Gala Lo, vascular surgery, paged at this time to review pt's overall clinical status.  Dr. Lo promptly called back.  Dr. Lo reviewed pt's overall clinical status with Dr. Hernandez.  At this time, in setting of pt remaining hemodynamically stable, respiratory status stable, does not require emergent guillotine surgery tonight; however, will need surgery today.  Requests for pt to NPO.   -   Opal, nephrology, paged at this time who promptly called back to discuss pt's clinical status.  Shared with Dr. Joseph that pt currently on 70% FiO2, CXR not consistent with hypoxemia noted, in no apparent respiratory distress, making minimal urine, trialed 1L NS bolus.  Dr. Joseph notes will order bicarb amp and infusion now, notes pt does not require urgent dialysis at this time as LIMA often improves after amputation and rhadomyolysis improves; however, notes urgent dialysis recommended if develops hyperkalemia.  Pt continues on CK checks Q6H.  Will add BMP and VBG Q6H as well.      Discussed with and defer further cares to nursing, Dr. Paulino, hospitalist, Dr. Lo, vascular surgery and Dr. Joseph, nephrology    Interval History     Shirley Hendricks is a 63 year old female who was admitted on 3/29/2024 for RLE pain.    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 clot    Post-menopausal     Reflux esophagitis 02/2004    EGD: esophagitis and medium HH    SBO (small bowel obstruction) (H) 08/10/2021    SVT (supraventricular tachycardia)     Thrombocytopenia (H24)      Uncomplicated asthma     Vitamin C deficiency 08/12/2018     Code Status: Full Code    Allergies   Allergies   Allergen Reactions    Pantoprazole      Protonix caused diffuse edema    Chantix [Varenicline]      Terrible dreams    Contrast Dye Swelling     Patient reports facial and throat swelling with prior CT contrast.    Penicillins Itching and Rash       Physical Exam   Vital Signs with Ranges:  Temp:  [96.4  F (35.8  C)-99.5  F (37.5  C)] 98.8  F (37.1  C)  Pulse:  [] 80  Resp:  [10-37] 24  BP: ()/() 152/76  FiO2 (%):  [82 %-90 %] 90 %  SpO2:  [83 %-100 %] 93 %  I/O last 3 completed shifts:  In: 4704.41 [I.V.:1929.41; IV Piggyback:2000]  Out: 345 [Urine:345]    Constitutional: Pt lying in bed, eyes closed, in no overt distress  Neck: No upper airway wheezes or stridor noted  Pulmonary: In no apparent respiratory distress, clear to auscultation bilaterally, no crackles or wheezes noted  Cardiovascular: Regular rate and rhythm, normal S1S2, no murmur, rub or gallop noted  GI: Distended, taut, active bowel sounds, generalized tenderness to palpation, no obvious guarding or rebound tenderness noted  Skin/Integumen: Warm, dry, RLE cool, noted discoloration of RLE, noted some pink mottling/discoloration on R aspect of umbilicus  Neuro: Opens eyes to voice, clear speech, no obvious focal neuro deficit noted, moving all extremities   Psych:  Calm  Extremities: No peripheral edema    Data     IMAGING: (X-ray/CT/MRI)   Recent Results (from the past 24 hour(s))   US Renal Complete Non-Vascular    Narrative    EXAM: US RENAL COMPLETE NON-VASCULAR  LOCATION: [facility]  DATE: 3/31/2024    INDICATION: ARF  COMPARISON: None.  TECHNIQUE: Routine Bilateral Renal and Bladder Ultrasound.    FINDINGS:    RIGHT KIDNEY: 10.9 x 5.1 x 5.1 cm. Normal parenchyma, without hydronephrosis or masses. Trace perinephric fluid.    LEFT KIDNEY: 6.3 x 3.6 x 4.0 cm. Atrophic without hydronephrosis or  masses.     BLADDER: Decompressed secondary to catheter.      Impression    IMPRESSION:  1.  No obstruction demonstrated.   XR Chest Port 1 View    Narrative    EXAM: XR CHEST PORT 1 VIEW  LOCATION: [facility]  DATE: 3/31/2024    INDICATION: RRT, hypoxia  COMPARISON: 12/10/2023      Impression    IMPRESSION: Heart is normal in size. Elevation of the right hemidiaphragm. Lungs otherwise appear clear. Right PICC line tip in the distal SVC.     ABG:  Recent Labs   Lab 03/31/24 2338 03/31/24 2010   PH  --  7.19*   PCO2  --  35   PO2  --  58*   HCO3  --  13*   O2PER 70 90     VBG:  Recent Labs   Lab 03/31/24 2338   PHV 7.15*   PO2V 50*   PCO2V 38*   HCO3V 13*     Lactic acid:  Recent Labs   Lab 03/31/24 2010   LACT 1.1     Comprehensive Metabolic Panel:  Recent Labs   Lab 03/31/24 2338   *   POTASSIUM 5.1   CHLORIDE 109*   CO2 12*   ANIONGAP 13   *   BUN 64.9*   CR 2.39*   GFRESTIMATED 22*   SONIA 5.0*     Time Spent on this Encounter   I spent 60 minutes of critical care time on the unit/floor managing the care of Shirley Hendricks. Upon evaluation, this patient had a high probability of imminent or life-threatening deterioration due to respiratory failure, LIMA, rhabdomyolysis, which required my direct attention, intervention, and personal management. 100% of my time was spent at the bedside counseling the patient and/or coordinating care regarding services listed in this note.    NOELLE Nunes [facility]  Hospitalist-House BRANDON  Hospitalist Service  Securely message with Vocera (more info)  Text page via [facility] Paging/Directory

## 2024-04-02 ENCOUNTER — APPOINTMENT (OUTPATIENT)
Dept: GENERAL RADIOLOGY | Facility: CLINIC | Age: 64
DRG: 270 | End: 2024-04-02
Payer: COMMERCIAL

## 2024-04-02 ENCOUNTER — APPOINTMENT (OUTPATIENT)
Dept: OCCUPATIONAL THERAPY | Facility: CLINIC | Age: 64
DRG: 270 | End: 2024-04-02
Attending: PHYSICIAN ASSISTANT
Payer: COMMERCIAL

## 2024-04-02 ENCOUNTER — APPOINTMENT (OUTPATIENT)
Dept: PHYSICAL THERAPY | Facility: CLINIC | Age: 64
DRG: 270 | End: 2024-04-02
Attending: PHYSICIAN ASSISTANT
Payer: COMMERCIAL

## 2024-04-02 LAB
ANION GAP SERPL CALCULATED.3IONS-SCNC: 14 MMOL/L (ref 7–15)
ANION GAP SERPL CALCULATED.3IONS-SCNC: 17 MMOL/L (ref 7–15)
ANION GAP SERPL CALCULATED.3IONS-SCNC: 17 MMOL/L (ref 7–15)
ANION GAP SERPL CALCULATED.3IONS-SCNC: 18 MMOL/L (ref 7–15)
ATRIAL RATE - MUSE: 93 BPM
BASE EXCESS BLDV CALC-SCNC: -0.8 MMOL/L (ref -3–3)
BASE EXCESS BLDV CALC-SCNC: -4.9 MMOL/L (ref -3–3)
BASE EXCESS BLDV CALC-SCNC: -7 MMOL/L (ref -3–3)
BASE EXCESS BLDV CALC-SCNC: 5.5 MMOL/L (ref -3–3)
BASOPHILS # BLD AUTO: ABNORMAL 10*3/UL
BASOPHILS # BLD MANUAL: 0 10E3/UL (ref 0–0.2)
BASOPHILS NFR BLD AUTO: ABNORMAL %
BASOPHILS NFR BLD MANUAL: 0 %
BLD PROD TYP BPU: NORMAL
BLOOD COMPONENT TYPE: NORMAL
BUN SERPL-MCNC: 78.5 MG/DL (ref 8–23)
BUN SERPL-MCNC: 81.2 MG/DL (ref 8–23)
BUN SERPL-MCNC: 84.8 MG/DL (ref 8–23)
BUN SERPL-MCNC: 85 MG/DL (ref 8–23)
BURR CELLS BLD QL SMEAR: SLIGHT
C PNEUM DNA SPEC QL NAA+PROBE: NOT DETECTED
CA-I BLD-MCNC: 3.5 MG/DL (ref 4.4–5.2)
CALCIUM SERPL-MCNC: 5.8 MG/DL (ref 8.8–10.2)
CALCIUM SERPL-MCNC: 6 MG/DL (ref 8.8–10.2)
CHLORIDE SERPL-SCNC: 102 MMOL/L (ref 98–107)
CHLORIDE SERPL-SCNC: 103 MMOL/L (ref 98–107)
CK SERPL-CCNC: 6852 U/L (ref 26–192)
CK SERPL-CCNC: 6886 U/L (ref 26–192)
CK SERPL-CCNC: 6912 U/L (ref 26–192)
CK SERPL-CCNC: 7289 U/L (ref 26–192)
CODING SYSTEM: NORMAL
CREAT SERPL-MCNC: 3.28 MG/DL (ref 0.51–0.95)
CREAT SERPL-MCNC: 3.43 MG/DL (ref 0.51–0.95)
CREAT SERPL-MCNC: 3.6 MG/DL (ref 0.51–0.95)
CREAT SERPL-MCNC: 3.64 MG/DL (ref 0.51–0.95)
CROSSMATCH: NORMAL
DEPRECATED HCO3 PLAS-SCNC: 16 MMOL/L (ref 22–29)
DEPRECATED HCO3 PLAS-SCNC: 19 MMOL/L (ref 22–29)
DEPRECATED HCO3 PLAS-SCNC: 20 MMOL/L (ref 22–29)
DEPRECATED HCO3 PLAS-SCNC: 30 MMOL/L (ref 22–29)
DIASTOLIC BLOOD PRESSURE - MUSE: NORMAL MMHG
EGFRCR SERPLBLD CKD-EPI 2021: 13 ML/MIN/1.73M2
EGFRCR SERPLBLD CKD-EPI 2021: 13 ML/MIN/1.73M2
EGFRCR SERPLBLD CKD-EPI 2021: 14 ML/MIN/1.73M2
EGFRCR SERPLBLD CKD-EPI 2021: 15 ML/MIN/1.73M2
EOSINOPHIL # BLD AUTO: ABNORMAL 10*3/UL
EOSINOPHIL # BLD MANUAL: 0 10E3/UL (ref 0–0.7)
EOSINOPHIL NFR BLD AUTO: ABNORMAL %
EOSINOPHIL NFR BLD MANUAL: 0 %
ERYTHROCYTE [DISTWIDTH] IN BLOOD BY AUTOMATED COUNT: 15.3 % (ref 10–15)
ERYTHROCYTE [DISTWIDTH] IN BLOOD BY AUTOMATED COUNT: 16 % (ref 10–15)
FLUAV H1 2009 PAND RNA SPEC QL NAA+PROBE: NOT DETECTED
FLUAV H1 RNA SPEC QL NAA+PROBE: NOT DETECTED
FLUAV H3 RNA SPEC QL NAA+PROBE: NOT DETECTED
FLUAV RNA SPEC QL NAA+PROBE: NOT DETECTED
FLUBV RNA SPEC QL NAA+PROBE: NOT DETECTED
GLUCOSE BLDC GLUCOMTR-MCNC: 104 MG/DL (ref 70–99)
GLUCOSE BLDC GLUCOMTR-MCNC: 115 MG/DL (ref 70–99)
GLUCOSE BLDC GLUCOMTR-MCNC: 118 MG/DL (ref 70–99)
GLUCOSE BLDC GLUCOMTR-MCNC: 131 MG/DL (ref 70–99)
GLUCOSE BLDC GLUCOMTR-MCNC: 67 MG/DL (ref 70–99)
GLUCOSE BLDC GLUCOMTR-MCNC: 69 MG/DL (ref 70–99)
GLUCOSE BLDC GLUCOMTR-MCNC: 72 MG/DL (ref 70–99)
GLUCOSE BLDC GLUCOMTR-MCNC: 72 MG/DL (ref 70–99)
GLUCOSE SERPL-MCNC: 108 MG/DL (ref 70–99)
GLUCOSE SERPL-MCNC: 116 MG/DL (ref 70–99)
GLUCOSE SERPL-MCNC: 68 MG/DL (ref 70–99)
GLUCOSE SERPL-MCNC: 68 MG/DL (ref 70–99)
HADV DNA SPEC QL NAA+PROBE: NOT DETECTED
HCO3 BLDV-SCNC: 20 MMOL/L (ref 21–28)
HCO3 BLDV-SCNC: 21 MMOL/L (ref 21–28)
HCO3 BLDV-SCNC: 26 MMOL/L (ref 21–28)
HCO3 BLDV-SCNC: 32 MMOL/L (ref 21–28)
HCOV PNL SPEC NAA+PROBE: NOT DETECTED
HCT VFR BLD AUTO: 19.7 % (ref 35–47)
HCT VFR BLD AUTO: 25 % (ref 35–47)
HGB BLD-MCNC: 6.7 G/DL (ref 11.7–15.7)
HGB BLD-MCNC: 8.7 G/DL (ref 11.7–15.7)
HMPV RNA SPEC QL NAA+PROBE: NOT DETECTED
HPIV1 RNA SPEC QL NAA+PROBE: NOT DETECTED
HPIV2 RNA SPEC QL NAA+PROBE: NOT DETECTED
HPIV3 RNA SPEC QL NAA+PROBE: NOT DETECTED
HPIV4 RNA SPEC QL NAA+PROBE: NOT DETECTED
IMM GRANULOCYTES # BLD: ABNORMAL 10*3/UL
IMM GRANULOCYTES NFR BLD: ABNORMAL %
INTERPRETATION ECG - MUSE: NORMAL
LYMPHOCYTES # BLD AUTO: ABNORMAL 10*3/UL
LYMPHOCYTES # BLD MANUAL: 0.4 10E3/UL (ref 0.8–5.3)
LYMPHOCYTES NFR BLD AUTO: ABNORMAL %
LYMPHOCYTES NFR BLD MANUAL: 5 %
M PNEUMO DNA SPEC QL NAA+PROBE: NOT DETECTED
MCH RBC QN AUTO: 28.2 PG (ref 26.5–33)
MCH RBC QN AUTO: 28.6 PG (ref 26.5–33)
MCHC RBC AUTO-ENTMCNC: 34 G/DL (ref 31.5–36.5)
MCHC RBC AUTO-ENTMCNC: 34.8 G/DL (ref 31.5–36.5)
MCV RBC AUTO: 82 FL (ref 78–100)
MCV RBC AUTO: 83 FL (ref 78–100)
METAMYELOCYTES # BLD MANUAL: 0.5 10E3/UL
METAMYELOCYTES NFR BLD MANUAL: 6 %
MONOCYTES # BLD AUTO: ABNORMAL 10*3/UL
MONOCYTES # BLD MANUAL: 0.3 10E3/UL (ref 0–1.3)
MONOCYTES NFR BLD AUTO: ABNORMAL %
MONOCYTES NFR BLD MANUAL: 4 %
MYELOCYTES # BLD MANUAL: 0.2 10E3/UL
MYELOCYTES NFR BLD MANUAL: 3 %
NEUTROPHILS # BLD AUTO: ABNORMAL 10*3/UL
NEUTROPHILS # BLD MANUAL: 6.2 10E3/UL (ref 1.6–8.3)
NEUTROPHILS NFR BLD AUTO: ABNORMAL %
NEUTROPHILS NFR BLD MANUAL: 82 %
NRBC # BLD AUTO: 0.1 10E3/UL
NRBC # BLD AUTO: 0.1 10E3/UL
NRBC BLD AUTO-RTO: 1 /100
NRBC BLD MANUAL-RTO: 1 %
O2/TOTAL GAS SETTING VFR VENT: 0 %
O2/TOTAL GAS SETTING VFR VENT: 44 %
O2/TOTAL GAS SETTING VFR VENT: 55 %
O2/TOTAL GAS SETTING VFR VENT: 6 %
OXYHGB MFR BLDV: 70 % (ref 70–75)
OXYHGB MFR BLDV: 70 % (ref 70–75)
OXYHGB MFR BLDV: 73 % (ref 70–75)
OXYHGB MFR BLDV: 79 % (ref 70–75)
P AXIS - MUSE: 76 DEGREES
PCO2 BLDV: 43 MM HG (ref 40–50)
PCO2 BLDV: 44 MM HG (ref 40–50)
PCO2 BLDV: 51 MM HG (ref 40–50)
PCO2 BLDV: 57 MM HG (ref 40–50)
PH BLDV: 7.27 [PH] (ref 7.32–7.43)
PH BLDV: 7.29 [PH] (ref 7.32–7.43)
PH BLDV: 7.31 [PH] (ref 7.32–7.43)
PH BLDV: 7.36 [PH] (ref 7.32–7.43)
PLAT MORPH BLD: ABNORMAL
PLATELET # BLD AUTO: 59 10E3/UL (ref 150–450)
PLATELET # BLD AUTO: 63 10E3/UL (ref 150–450)
PO2 BLDV: 42 MM HG (ref 25–47)
PO2 BLDV: 48 MM HG (ref 25–47)
POTASSIUM SERPL-SCNC: 4.7 MMOL/L (ref 3.4–5.3)
POTASSIUM SERPL-SCNC: 4.8 MMOL/L (ref 3.4–5.3)
POTASSIUM SERPL-SCNC: 4.8 MMOL/L (ref 3.4–5.3)
POTASSIUM SERPL-SCNC: 4.9 MMOL/L (ref 3.4–5.3)
PR INTERVAL - MUSE: 184 MS
QRS DURATION - MUSE: 62 MS
QT - MUSE: 358 MS
QTC - MUSE: 445 MS
R AXIS - MUSE: 0 DEGREES
RBC # BLD AUTO: 2.38 10E6/UL (ref 3.8–5.2)
RBC # BLD AUTO: 3.04 10E6/UL (ref 3.8–5.2)
RBC MORPH BLD: ABNORMAL
RSV RNA SPEC QL NAA+PROBE: NOT DETECTED
RSV RNA SPEC QL NAA+PROBE: NOT DETECTED
RV+EV RNA SPEC QL NAA+PROBE: NOT DETECTED
SAO2 % BLDV: 70.7 % (ref 70–75)
SAO2 % BLDV: 71.8 % (ref 70–75)
SAO2 % BLDV: 74 % (ref 70–75)
SAO2 % BLDV: 80.5 % (ref 70–75)
SODIUM SERPL-SCNC: 137 MMOL/L (ref 135–145)
SODIUM SERPL-SCNC: 138 MMOL/L (ref 135–145)
SODIUM SERPL-SCNC: 139 MMOL/L (ref 135–145)
SODIUM SERPL-SCNC: 146 MMOL/L (ref 135–145)
SYSTOLIC BLOOD PRESSURE - MUSE: NORMAL MMHG
T AXIS - MUSE: 6 DEGREES
UFH PPP CHRO-ACNC: <0.1 IU/ML
UNIT ABO/RH: NORMAL
UNIT NUMBER: NORMAL
UNIT STATUS: NORMAL
UNIT TYPE ISBT: 6200
VENTRICULAR RATE- MUSE: 93 BPM
WBC # BLD AUTO: 6.5 10E3/UL (ref 4–11)
WBC # BLD AUTO: 7.5 10E3/UL (ref 4–11)

## 2024-04-02 PROCEDURE — 97161 PT EVAL LOW COMPLEX 20 MIN: CPT | Mod: GP | Performed by: PHYSICAL THERAPIST

## 2024-04-02 PROCEDURE — 250N000011 HC RX IP 250 OP 636: Performed by: INTERNAL MEDICINE

## 2024-04-02 PROCEDURE — 93005 ELECTROCARDIOGRAM TRACING: CPT

## 2024-04-02 PROCEDURE — L5999 LOWR EXTREMITY PROSTHES NOS: HCPCS

## 2024-04-02 PROCEDURE — 87633 RESP VIRUS 12-25 TARGETS: CPT | Performed by: STUDENT IN AN ORGANIZED HEALTH CARE EDUCATION/TRAINING PROGRAM

## 2024-04-02 PROCEDURE — 82805 BLOOD GASES W/O2 SATURATION: CPT | Performed by: STUDENT IN AN ORGANIZED HEALTH CARE EDUCATION/TRAINING PROGRAM

## 2024-04-02 PROCEDURE — 80048 BASIC METABOLIC PNL TOTAL CA: CPT | Performed by: STUDENT IN AN ORGANIZED HEALTH CARE EDUCATION/TRAINING PROGRAM

## 2024-04-02 PROCEDURE — 82550 ASSAY OF CK (CPK): CPT | Performed by: STUDENT IN AN ORGANIZED HEALTH CARE EDUCATION/TRAINING PROGRAM

## 2024-04-02 PROCEDURE — 97530 THERAPEUTIC ACTIVITIES: CPT | Mod: GP | Performed by: PHYSICAL THERAPIST

## 2024-04-02 PROCEDURE — 200N000001 HC R&B ICU

## 2024-04-02 PROCEDURE — 250N000011 HC RX IP 250 OP 636: Performed by: STUDENT IN AN ORGANIZED HEALTH CARE EDUCATION/TRAINING PROGRAM

## 2024-04-02 PROCEDURE — 97166 OT EVAL MOD COMPLEX 45 MIN: CPT | Mod: GO

## 2024-04-02 PROCEDURE — 250N000009 HC RX 250: Performed by: STUDENT IN AN ORGANIZED HEALTH CARE EDUCATION/TRAINING PROGRAM

## 2024-04-02 PROCEDURE — 93010 ELECTROCARDIOGRAM REPORT: CPT | Performed by: INTERNAL MEDICINE

## 2024-04-02 PROCEDURE — 97530 THERAPEUTIC ACTIVITIES: CPT | Mod: GO

## 2024-04-02 PROCEDURE — 999N000009 HC STATISTIC AIRWAY CARE

## 2024-04-02 PROCEDURE — 85007 BL SMEAR W/DIFF WBC COUNT: CPT | Performed by: STUDENT IN AN ORGANIZED HEALTH CARE EDUCATION/TRAINING PROGRAM

## 2024-04-02 PROCEDURE — P9016 RBC LEUKOCYTES REDUCED: HCPCS | Performed by: INTERNAL MEDICINE

## 2024-04-02 PROCEDURE — 94640 AIRWAY INHALATION TREATMENT: CPT

## 2024-04-02 PROCEDURE — L5460 POSTOP APP NON-WGT BEAR DSG: HCPCS

## 2024-04-02 PROCEDURE — 94640 AIRWAY INHALATION TREATMENT: CPT | Mod: 76

## 2024-04-02 PROCEDURE — 999N000157 HC STATISTIC RCP TIME EA 10 MIN

## 2024-04-02 PROCEDURE — L8460 SHRINKER ABOVE KNEE: HCPCS

## 2024-04-02 PROCEDURE — 99232 SBSQ HOSP IP/OBS MODERATE 35: CPT | Performed by: INTERNAL MEDICINE

## 2024-04-02 PROCEDURE — L5695 AK SLEEVE SUSP NEOPRENE/EQUA: HCPCS

## 2024-04-02 PROCEDURE — 97110 THERAPEUTIC EXERCISES: CPT | Mod: GP | Performed by: PHYSICAL THERAPIST

## 2024-04-02 PROCEDURE — 250N000013 HC RX MED GY IP 250 OP 250 PS 637: Performed by: STUDENT IN AN ORGANIZED HEALTH CARE EDUCATION/TRAINING PROGRAM

## 2024-04-02 PROCEDURE — 99233 SBSQ HOSP IP/OBS HIGH 50: CPT | Performed by: STUDENT IN AN ORGANIZED HEALTH CARE EDUCATION/TRAINING PROGRAM

## 2024-04-02 PROCEDURE — 71045 X-RAY EXAM CHEST 1 VIEW: CPT

## 2024-04-02 PROCEDURE — 82374 ASSAY BLOOD CARBON DIOXIDE: CPT | Performed by: STUDENT IN AN ORGANIZED HEALTH CARE EDUCATION/TRAINING PROGRAM

## 2024-04-02 PROCEDURE — 85041 AUTOMATED RBC COUNT: CPT | Performed by: STUDENT IN AN ORGANIZED HEALTH CARE EDUCATION/TRAINING PROGRAM

## 2024-04-02 PROCEDURE — 85027 COMPLETE CBC AUTOMATED: CPT | Performed by: STUDENT IN AN ORGANIZED HEALTH CARE EDUCATION/TRAINING PROGRAM

## 2024-04-02 PROCEDURE — 82330 ASSAY OF CALCIUM: CPT | Performed by: INTERNAL MEDICINE

## 2024-04-02 PROCEDURE — 85520 HEPARIN ASSAY: CPT | Performed by: STUDENT IN AN ORGANIZED HEALTH CARE EDUCATION/TRAINING PROGRAM

## 2024-04-02 PROCEDURE — 258N000001 HC RX 258: Performed by: STUDENT IN AN ORGANIZED HEALTH CARE EDUCATION/TRAINING PROGRAM

## 2024-04-02 RX ORDER — ASPIRIN 81 MG/1
81 TABLET ORAL DAILY
Status: DISCONTINUED | OUTPATIENT
Start: 2024-04-02 | End: 2024-04-02

## 2024-04-02 RX ORDER — ALBUTEROL SULFATE 0.83 MG/ML
2.5 SOLUTION RESPIRATORY (INHALATION)
Status: DISCONTINUED | OUTPATIENT
Start: 2024-04-02 | End: 2024-04-19

## 2024-04-02 RX ORDER — BUMETANIDE 0.25 MG/ML
2 INJECTION INTRAMUSCULAR; INTRAVENOUS EVERY 6 HOURS
Status: COMPLETED | OUTPATIENT
Start: 2024-04-02 | End: 2024-04-02

## 2024-04-02 RX ORDER — GABAPENTIN 100 MG/1
100 CAPSULE ORAL 3 TIMES DAILY
Status: CANCELLED | OUTPATIENT
Start: 2024-04-02

## 2024-04-02 RX ORDER — CALCIUM GLUCONATE 20 MG/ML
2 INJECTION, SOLUTION INTRAVENOUS ONCE
Status: COMPLETED | OUTPATIENT
Start: 2024-04-02 | End: 2024-04-02

## 2024-04-02 RX ADMIN — GABAPENTIN 100 MG: 100 CAPSULE ORAL at 16:12

## 2024-04-02 RX ADMIN — BUMETANIDE 2 MG: 0.25 INJECTION INTRAMUSCULAR; INTRAVENOUS at 13:05

## 2024-04-02 RX ADMIN — SODIUM BICARBONATE 20 ML/HR: 84 INJECTION, SOLUTION INTRAVENOUS at 11:53

## 2024-04-02 RX ADMIN — CEFEPIME HYDROCHLORIDE 1 G: 1 INJECTION, POWDER, FOR SOLUTION INTRAMUSCULAR; INTRAVENOUS at 09:34

## 2024-04-02 RX ADMIN — ALBUTEROL SULFATE 2.5 MG: 2.5 SOLUTION RESPIRATORY (INHALATION) at 08:24

## 2024-04-02 RX ADMIN — FAMOTIDINE 20 MG: 10 INJECTION, SOLUTION INTRAVENOUS at 06:08

## 2024-04-02 RX ADMIN — BETAMETHASONE DIPROPIONATE: 0.5 CREAM TOPICAL at 23:15

## 2024-04-02 RX ADMIN — CALCIUM GLUCONATE 2 G: 20 INJECTION, SOLUTION INTRAVENOUS at 03:56

## 2024-04-02 RX ADMIN — AMLODIPINE BESYLATE 2.5 MG: 2.5 TABLET ORAL at 23:15

## 2024-04-02 RX ADMIN — ALBUTEROL SULFATE 2.5 MG: 2.5 SOLUTION RESPIRATORY (INHALATION) at 19:51

## 2024-04-02 RX ADMIN — ACETAMINOPHEN 325 MG: 325 TABLET, FILM COATED ORAL at 16:12

## 2024-04-02 RX ADMIN — ALBUTEROL SULFATE 2.5 MG: 2.5 SOLUTION RESPIRATORY (INHALATION) at 13:12

## 2024-04-02 RX ADMIN — BUMETANIDE 2 MG: 0.25 INJECTION INTRAMUSCULAR; INTRAVENOUS at 18:19

## 2024-04-02 RX ADMIN — DEXTROSE MONOHYDRATE 25 ML: 25 INJECTION, SOLUTION INTRAVENOUS at 12:09

## 2024-04-02 RX ADMIN — NICOTINE 1 PATCH: 14 PATCH, EXTENDED RELEASE TRANSDERMAL at 09:38

## 2024-04-02 RX ADMIN — OMEPRAZOLE 20 MG: 20 CAPSULE, DELAYED RELEASE ORAL at 10:47

## 2024-04-02 RX ADMIN — GABAPENTIN 100 MG: 100 CAPSULE ORAL at 10:47

## 2024-04-02 ASSESSMENT — ACTIVITIES OF DAILY LIVING (ADL)
ADLS_ACUITY_SCORE: 38
DEPENDENT_IADLS:: INDEPENDENT
ADLS_ACUITY_SCORE: 38
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
## 2024-04-02 NOTE — DISCHARGE INSTRUCTIONS
"Right amputation site dressing changes:    Keep right leg wound clean and dry. Please change dressing daily or when it is visibly soiled or wet. Apply xeroform strip to suture line and cover with gauze and loosely wrapped kerlix to tight leg. The gauze wrap should not be tight on right leg. No ACE wrap to right leg. Please notify vascular surgery team if wound has increased redness, drainage, or new openings along suture line.     Some additional resources:       Web: https://[employer]org/   Phone: 771.803.8163   [employer] is a nonprofit serving seniors and individuals with disabilities across [place] [facility] [place] area.  Our grocery assistance, home support and transportation services provide help with in-home tasks and chores.    Meals on Wheels [facility] call [phone]               Web: https://[employer] (acquired by [employer] in 2014[employer]org/services/  Phone: (381) 866-2651              If your family would like extra support, here is one great resource:   \"[employer]\" call 002-898-CARE(6218)  Customer service hours: Monday - Saturday, 9 a.m. - 7 p.m.              Other resources:                     "

## 2024-04-02 NOTE — PROGRESS NOTES
VASCULAR SURGERY    In ICU.  Clinically markedly improved.  Daughter at bedside.  Alert and appropriate.  +3 left graft pulse.    Wound VAC intact right open AKA    Impression: #1.  Doing much better following open AKA.         #2.  Oliguric LIMA.  Very little urine output.  Increase SCr with normal renal function prior to admission.  Awaiting nephrology consultation.  Would expect this to gradually improve but may require temporary dialysis.     #3.   Anemia requiring repeat transfusion this morning.  Hemodynamically stable.  Will not require major anticoagulation since Xarelto was to keep prosthetic graft patent and right leg.      Discussed with patient and daughter.    Chadwick Lozano MD

## 2024-04-02 NOTE — PLAN OF CARE
Goal Outcome Evaluation:      Plan of Care Reviewed With: patient    Overall Patient Progress: no changeOverall Patient Progress: no change    Outcome Evaluation: pt A&Ox4, intermittently confused. Arouses to voice and follows commands. Incision site CDI to wound vac w/ minimal output. Lungs course/wheezes. Tele SR. pulses present in extremeties. Mottling to RLE, vascular aware and following. On 6L Oxymask. Minimal urine output. Was advanced to clear diet, but now NPO d/t coughing and lung sounds. Chest x-ray ordered. 1 unit blood given.      Problem: Pain Acute  Goal: Optimal Pain Control and Function  Intervention: Develop Pain Management Plan  Flowsheets  Taken 4/2/2024 0644  Pain Management Interventions:   MD notified (comment)   pain pump in use   repositioned   rest   pillow support provided   pain management plan reviewed with patient/caregiver  Taken 4/2/2024 0400  Pain Management Interventions:   MD notified (comment)   pain pump in use   repositioned   rest  Taken 4/2/2024 0000  Pain Management Interventions: pain pump in use  Taken 4/1/2024 2000  Pain Management Interventions: pain pump in use

## 2024-04-02 NOTE — PLAN OF CARE
Goal Outcome Evaluation:      Plan of Care Reviewed With: patient, family    Overall Patient Progress: improvingOverall Patient Progress: improving    Outcome Evaluation: Patient went to OR today for R BKA. Remains lethargic post surgery, although does arouse to voice and stimulation. PERRL. Incision site is clean and dry, to wound vac with 0 output. Pulses present in all extremeties, skin is warm. Some mottling on right lower extremity below the knee. On 4L oxymask, breath sounds expiratory wheezing, congested cough. Inadequate urine output. Plan to advance diet as tolerated. Family updated at bedside.
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
## 2024-04-02 NOTE — PROGRESS NOTES
"VASCULAR SURGERY PROGRESS NOTE    Subjective:  Underwent right through knee guillotine amputation yesterday with minimal blood flow.  Continue to require 6 L of oxygen overnight and confused this morning with increased work of breathing.  6 cc of urine output overnight with increased creatinine to 3.5 this morning.  Transfusion of 1 unit PRBC initiated per hospitalist team.    Objective:  Intake/Output Summary (Last 24 hours) at 4/1/2024 0715  Last data filed at 4/1/2024 0400  Gross per 24 hour   Intake 3505.58 ml   Output 146 ml   Net 3359.58 ml     PHYSICAL EXAM:  BP (!) 146/89   Pulse 98   Temp 98.2  F (36.8  C)   Resp 15   Ht 1.549 m (5' 1\")   Wt 55.2 kg (121 lb 12.8 oz)   LMP  (LMP Unknown)   SpO2 91%   BMI 23.01 kg/m    General: The patient is awake, alert to place and self but difficult to maintain conversation and appears slightly confused  Psych: pleasant affect  Skin: Color appropriate for race, warm, dry.  Respiratory: The patient does require supplemental oxygen.  Increased breathing effort on facemask this morning   GI:  Abdomen soft, nontender to light palpation.  Significant edema noted  Extremities: Right through knee stump with wound VAC in place and minimal output.  Ecchymosis noted to right lateral thigh where graft was removed.  Sutures in place.  Bilateral groin with 2+ common femoral pulse and sutures in place without skin breakdown noted.    Labs:  Na 138  K 4.8  Cr 3.4 from 0.45 on admission  CK 6912 from 9k  WBC 6.5  Hgb 6.7  Platelet 59 from 62      Imaging:   Angiograms reviewed:   3/30: Active extravasation from right common femoral artery, Viabahn covered stent placement noted with further embolization of common femoral artery branch.  Patent bypass but very minimal runoff  3/31: Patent bypass, continued minimal outflow.    ASSESSMENT:  63-year-old female well-known to the vascular surgery team with hx of:    2/15/2022: Distal popliteal artery occlusion with jump graft to distal " popliteal/tibial arteries  5/6/2023: Right distal femoral to popliteal graft revision with cadaveric SFA to mid anterior tibial artery  10/7/2023: Emergent right femoral and anterior tibial thrombectomy.  Right femoral to anterior tibial Propaten 6 mm PTFE bypass (distal graft anastomosed to remaining several centimeters of cadaveric SFA graft ). graft anterior tibial enterectomy and vein patch angioplasty.  Dorsalis pedis thrombectomy   10/11/2023: Emergency right femoral to anterior tibial PTFE bypass graft thrombectomy with anterior tibial thrombectomy  1/4/24: Widely patent right leg bypass graft on duplex @office visit    Patient presented with acute occlusion of right fem to anterior tibial PTFE/cadaveric SFA graft.  Initial heparin and lytics with angiogram notable for patent bypass but bleeding in groin resolved with Viabahn stent placement and embolization of side branch but with persistent decreased outflow and inability to tolerate further lytics due to continued hypofibrinogenemia, hematochezia, and worsening clinical status of right foot.    Now s/p right through knee guillotine amp on 4/1    PLAN:  -Chest x-ray this morning  -ABG and repeat BMP  -Discussed need for dialysis with nephrology due to increased fluid status and worsening respiratory status  -Consumptive coagulopathy noted, no indication for platelet transfusion at this time but repeat daily CBC  -No heparin, follow-up HIT panel  -IV fluid per nephrology   -efepime and vanc to discontinue today  -home BP meds restarted  -Out of bed and in chair, PT consulted  -NPO due to significant coughing per nursing report during p.o. intake, will need swallow evaluation when neurological status improved  -will need plavix when platelets improve as she does have bypass in LLE  -appreciate medical team for co management in care    Discussed pt history, exam, assessment and plan with Dr. Hernandez of the vascular surgery service, who is in agreement with the  above.    Gala Morales, DO  VASCULAR SURGERY

## 2024-04-02 NOTE — CONSULTS
SPIRITUAL HEALTH SERVICES - Consult Note  [facility] ICU    Referral Source/Reason for Visit: Consult Request    Summary and Recommendations -  Shirley's daughter shared that Shirley is having a leg amputation surgery on Friday.  Malu shared complex family dynamics.  Malu and Yue request continued spiritual support.    Plan: [facility] will continue to follow and support Shirley and her family during this hospitalization.     Mira Nava (they/themRamon Cifuentes  Spiritual Health Services  Chaplain Resident    [facility] routine referrals?*[phone]  [facility] available 24/7 for emergent requests/referrals, either by paging the on-call  or by entering an ASAP/STAT consult in Epic (this will also page the on-call ).       Assessment    Saw pt Shirley Hendricks, daughter (Malu), and sister (Yue) per consult request.    Patient/Family Understanding of Illness and Goals of Care -   Shirley was asleep for most of the visit. Malu shared that Shirley has been struggling since I last visited Shirley last October.  Malu shared that Shirley's first amputation surgery went well, and that there will be another surgery on Friday.    Distress and Loss -   Malu shared complex family dynamics that are affecting Shirley. Malu shared that she plans to care for her mother which is difficult because Malu lives in [place].  Malu and Yue shared that it has been a difficult few days with Shirley's illness in the hospital.    Strengths, Coping, and Resources -   Shirley's daughter and sister have been supporting her during this hospitalization. Shirley is also being visited by other family members.  Malu and Yue expressed gratitude to the staff for the care that Shirley has received.    Meaning, Beliefs, and Spirituality -   Shirley is a Jewish and benefited from spiritual care visits during her last hospitalization.  Prayer was welcomed and provided.

## 2024-04-02 NOTE — PROGRESS NOTES
Vascular note:    Pt seen at bedside. Denies pain but appears very somnolent although answers questions appropriately when stimulated. Alert to self and place and aware of procedure during hospitalization.     Noted to have increased oxygen requirements throughout the day with somnolence. Suspect this is due to uremia and fluid overload from LIMA with minimal urine output. Dr Lozano discussed with nephrology, will plan for tunnel cath tomorrow and initiation of HD. May need sooner if she has respiratory decompensation overnight or worsening clinic status.     Cont wound vac to right AKA stump.   ASA 81mg daily  Q6 hour BMP  Cont leger  Cont ICU care  Notify vascular team stat for any change in exam    D/W Dr Blake Morales, DO  Fellow

## 2024-04-02 NOTE — PROGRESS NOTES
[facility]    Medicine Progress Note - Hospitalist Service    Date of Admission:  3/29/2024  Date of Service: 04/02/2024    Assessment & Plan     Shirley Hendricks is a 63 year old female admitted on 3/29/2024 due to occlusion of right LE bypass graft.      Past medical history significant for PAD/PVD, Tobacco use D/O, CAD (history of MI x3), HTN, HLP, DM2, Neuropathy, COPD, GERD, Anemia, Spongiotic dermatitis, MDD with anxiety, Chronic anticoagulation therapy with history of DVT/PE, OA, Charcot-Breonna-Tooth foot deformity, Marginal zone B-cell lymphoma, History of SBO, History of Takotsubo CM, History of SVT.    Discussed with Dr. Mazariegos and reviewed ED notes and Vascular Surgery consult note.  Patient presented to the ED due to right leg pain that had begun ~ 1 hour prior to presentation.  She reported pain seems to worsen with walking and when she attempted to check a pulse in her leg it was absent.  She also described that the foot is colder than the left.      While in the lobby attempts to find right pedal pulse with doppler were unsuccessful.  Dr. Lozano of Vascular Surgery was contacted by the patient as well as Dr. Mazariegos.      Work-up completed while patient was in the lobby included right arterial LE US revealed the right common femoral artery to mid anterior tibial artery bypass graft occlusion.      After assessing patient labs were collected and included a CMP that revealed a sodium of 132, chloride of 97, CO2 of 21 and glucose of 103 otherwise within normal limits.  CBC with differential revealed an RDW of 15.7 otherwise unremarkable.    Right common femoral artery to mid anterior tibial artery bypass graft acute occlusion  PAD/PVD  Acute Blood Loss Anemia  *Occurred despite low-dose Xarelto and Plavix therapy.    - Vascular surgery consult requested -> urgent guillotine right above knee amputation today followed by close monitoring in the ICU.    - IR consulted and  following  --Per Vascular surgery consult Dr. Salomon was contacted and s/p right LE angiogram and lytic therapy 03/30 -> 03/31 took the patient to the angio suite to remove the sheath in the left groin with closure device.   - Hold PTA Xarelto 15 mg nightly.    - Defer resumption of PTA Plavix 75 mg/d to Vascular Surgery and/or IR.    - Statin and antihypertensive management as below.    - Pain management as needed  - Follow Hgb   - Cannot perform CTA of abdomen at this time and since hemodynamically stable ->  angiogram will see if there is any active extravasation.   - transfused 1 U PRBCs 03/30 / 03/31 and 04/02 and closely monitor.    Rhabdomyolysis   ARF  Assessment: CPK has been rising. Her serum creatinine and body urea nitrogen also rising. Renal US -> No obstruction demonstrated.   Plan:  - Follow CK / BMP daily  - Nephrology following -> No dialysis indication today   - Trial of Diuresis  - Alvarez catheter in place  - Avoid NSAIDs / nephrotoxins    Contrast dye allergy  - Ordered solumedrol and benadryl per contrast dye allergy protocol.      Mild hyponatremia  - BMP ordered for AM of 3/30.      Tobacco Use D/O   Patient currently smokes max 5 cigarettes per day.     - Counseled regarding smoking cessation that should also continue with PCP.    - Nicoderm patch ordered.    Recent celiac disease  *Patient reported recent GI work-up revealed she has celiac disease.    - Once diet can be advanced would request no gluten/celiac diet.      CAD (history of MI x3)  HTN  HLP  *Last coronary angiogram completed 10/2023.    - Resumed on PTA Coreg 6.25 mg BID, hold lisinopril 10 mg/d due to LIMA, continue Norvasc 2.5 mg/d.  Hold parameters in place.    - hold PTA rosuvastatin 40 mg/d due to acute rhabdo  - PRN IV hydralazine 10 mg every 4 hours for SBP GREATER THAN 180.    - Monitor on telemetry.      History of Takotsubo CM  *Recovered EF (55-60%) from ECHO 11/2023.    - Resumed on PTA Coreg 6.25 mg BID, lisinopril  10 mg/d and Jardiance 10 mg/d.      Type 2 DM  Diabetic neuropathy  *Noted diagnosis on chart review.  However, A1c of 5.2%.    - Resumed on PTA Jardiance 10 mg/d and gabapentin 100 mg TID.      COPD   - Resumed on PTA inhalers.    - Encourage use of Flutter valve/IS.      GERD  - Resumed on PTA omeprazole 20 mg/d.      Anemia  Recent GI bleed (12/2023 and admitted at [facility])  - Hold PTA Iron supplements and resume at discharge.    - CBC daily      Spongiotic dermatitis  *Recently seen at outpatient Derm.    - Resumed on PTA betamethasone cream BID.      MDD with anxiety  *Noted on chart review.  No interventions.      Chronic anticoagulation therapy with history of DVT/PE  - Hold PTA Xarelto.      OA  - Pain management as above.      Charcot-Breonna-Tooth foot deformity  *Noted on chart review.  No interventions.     Stage VALENTIN marginal zone B-cell lymphoma  *Follows with [facility] (Dr. Barrow).    - Continue outpatient surveillance (CT scan in 8/2024).      History of SBO  *Noted on chart review.  No interventions.    History of SVT  - Monitor on telemetry.    - Resumed on PTA Coreg as above.            Diet: Regular Diet Adult    DVT Prophylaxis: Pneumatic Compression Devices  Alvarez Catheter: PRESENT, indication: Acute retention or obstruction  Lines: PRESENT      PICC 03/31/24 Double Lumen Right Brachial vein medial access-Site Assessment: WDL      Cardiac Monitoring: ACTIVE order. Indication: ICU  Code Status: Full Code      Clinically Significant Risk Factors          # Hypocalcemia: Lowest iCa = 3.5 mg/dL in last 2 days, will monitor and replace as appropriate       # Thrombocytopenia: Lowest platelets = 59 in last 2 days, will monitor for bleeding  # Acute Kidney Injury, unspecified: based on a >150% or 0.3 mg/dL increase in last creatinine compared to past 90 day average, will monitor renal function  # Hypertension: Noted on problem list            # Financial/Environmental Concerns:            Disposition Plan     Expected Discharge Date: 04/05/2024,  3:00 PM                Marcin White MD  Hospitalist Service  [facility]  Securely message with [employer] (more info)  Text page via [employer] Paging/Directory   ______________________________________________________________________    Interval History     No acute events overnight  Doing better following open AKA.   Pain mostly controlled  No fevers  No new CP/SOB or abdominal pain  No new complaints    Physical Exam   Vital Signs: Temp: 98.6  F (37  C) Temp src: Bladder BP: (!) 124/90 Pulse: 102     SpO2: 90 % O2 Device: Oxymask Oxygen Delivery: 8 LPM  Weight: 121 lbs 12.8 oz      Constitutional: Awake, alert, cooperative, no apparent distress.    Pulmonary: CTABL  Cardiovascular: Regular rate and rhythm, normal S1 and S2, no S3 or S4, and no murmur noted.  GI: Normal bowel sounds, soft, non-distended, non-tender.    Neuro: Fahad but has sensory loss and is not able to wiggle toes but is able to plantarflex and dorsiflex.   Psych:  Alert and oriented x 3. Normal affect.  Extremities: Wound VAC intact right open AKA   ----------------------------------------------------------------------------------------    Medical Decision Making       50 MINUTES SPENT BY ME on the date of service doing chart review, history, exam, documentation & further activities per the note.      Data   ------------------------- PAST 24 HR DATA REVIEWED -----------------------------------------------    I have personally reviewed the following data over the past 24 hrs:    7.5  \   8.7 (L)   / 63 (L)     139 102 85.0 (H) /  131 (H)   4.8 20 (L) 3.60 (H) \       Imaging results reviewed over the past 24 hrs:   Recent Results (from the past 24 hour(s))   XR Chest Port 1 View    Narrative    XR CHEST PORT 1 VIEW  4/2/2024 8:40 AM       INDICATION: Sob  COMPARISON: 3/31/2024       Impression    IMPRESSION: New bilateral perihilar infiltrates most likely  representing  pulmonary edema. Right PICC line in good position in the  low SVC. Stable elevation of the right hemidiaphragm.    RAGINI DEVINE MD         SYSTEM ID:  [id number]     ------------------------- ENCOUNTER LABS ----------------------------------------------------------------  Recent [employer]   Lab 04/02/24  1616 04/02/24  1432 04/02/24  1311 04/02/24  1205 04/02/24  1201 04/02/24  0412 04/02/24  0404 04/02/24  0055 04/02/24  0049 04/01/24  0849 04/01/24  0539   WBC  --   --   --   --  7.5  --  6.5  --   --   --  4.0   HGB  --   --   --   --  8.7*  --  6.7*  --   --   --  7.9*   MCV  --   --   --   --  82  --  83  --   --   --  83   PLT  --   --   --   --  63*  --  59*  --   --   --  62*   NA  --   --  139  --   --   --  138  --  137   < > 135   POTASSIUM  --   --  4.8  --   --   --  4.8  --  4.9   < > 4.7   CHLORIDE  --   --  102  --   --   --  102  --  103   < > 108*   CO2  --   --  20*  --   --   --  19*  --  16*   < > 13*   BUN  --   --  85.0*  --   --   --  81.2*  --  78.5*   < > 69.9*   CR  --   --  3.60*  --   --   --  3.43*  --  3.28*   < > 2.58*   ANIONGAP  --   --  17*  --   --   --  17*  --  18*   < > 14   SONIA  --   --  6.0*  --   --   --  5.8*  --  5.8*   < > 5.7*   * 118* 116*   < >  --    < > 68*   < > 68*   < > 104*    < > = values in this interval not displayed.       Most Recent 3 CBC's:  Recent Labs   Lab Test 04/02/24  1201 04/02/24  0404 04/01/24  0539   WBC 7.5 6.5 4.0   HGB 8.7* 6.7* 7.9*   MCV 82 83 83   PLT 63* 59* 62*     Most Recent 3 BMP's:  Recent Labs   Lab Test 04/02/24  1616 04/02/24  1432 04/02/24  1311 04/02/24  0412 04/02/24  0404 04/02/24  0055 04/02/24  0049   NA  --   --  139  --  138  --  137   POTASSIUM  --   --  4.8  --  4.8  --  4.9   CHLORIDE  --   --  102  --  102  --  103   CO2  --   --  20*  --  19*  --  16*   BUN  --   --  85.0*  --  81.2*  --  78.5*   CR  --   --  3.60*  --  3.43*  --  3.28*   ANIONGAP  --   --  17*  --  17*  --  18*   SONIA  --   --  6.0*  --  5.8*  --   5.8*   * 118* 116*   < > 68*   < > 68*    < > = values in this interval not displayed.     Most Recent 2 LFT's:  Recent Labs   Lab Test 10/07/23  1950 10/03/23  0737   AST 18 25   ALT 15 22   ALKPHOS 35 82   BILITOTAL 0.2 0.9     Most Recent 3 INR's:  Recent Labs   Lab Test 10/07/23  0516 10/06/23  0551 10/05/23  1014   INR 1.38* 1.08 0.95     Most Recent 3 Troponin's:  Recent Labs   Lab Test 06/10/20  1243 02/16/20  1824 09/18/19  0739   TROPI <0.015 <0.015 <0.015     Most Recent 3 BNP's:No lab results found.  Most Recent D-dimer:  Recent Labs   Lab Test 08/13/21  0934   DD 10.38*     Most Recent Cholesterol Panel:  Recent Labs   Lab Test 10/03/23  0941   CHOL 137   LDL 69   HDL 54   TRIG 68     Most Recent 6 Bacteria Isolates From Any Culture (See EPIC Reports for Culture Details):No lab results found.  Most Recent TSH and T4:  Recent Labs   Lab Test 01/05/21  1119 02/04/19  0934 03/28/17  0922   TSH 0.77   < > 1.37   T4  --   --  1.19    < > = values in this interval not displayed.     Most Recent Urinalysis:  Recent Labs   Lab Test 02/04/19  0935   COLOR Yellow   APPEARANCE Clear   URINEGLC Negative   URINEBILI Negative   URINEKETONE Negative   SG 1.010   UBLD Negative   URINEPH 6.0   PROTEIN Negative   UROBILINOGEN 0.2   NITRITE Negative   LEUKEST Negative   RBCU O - 2   WBCU 0 - 5     Most Recent ESR & CRP:  Recent Labs   Lab Test 11/13/23  1705   SED 39*
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
## 2024-04-02 NOTE — PROGRESS NOTES
Renal Medicine Progress Note                                Shirley Hendricks MRN# [id number]   Age: 63 year old YOB: 1960   Date of Admission: 3/29/2024 Hospital LOS: 4                  Assessment/Plan:       1.  Suspect modest CKD at baseline              -slow progression over time              -DN/vascular disease  2.  Last available UA 2019              -no quantification studies  3.  Acute Kidney Injury              -oliguric              -IV contrast                          -3 separate consecutive exposures               -elevated CK     Likely BAILEY +/- rhabdo +/- ischemic injury  Presuming no interruption of RBF     4.  Acidosis              -NAGMA              -small respiratory component   5.  Ischemic RLE due to occluded bypass graft   -AKA due to unsalvagable limb 04/01/24  6.  Hypocalcemia  7.  Anemia   -transfuse         HCO3- improving  Creatinine continue to rise  Limited UO    No dialysis indication today  Diuretic today      Interval History:     More alert  Follows  Some confusion    Limited UO      ROS:     GENERAL: NAD, No fever,chills  CV: NEGATIVE for chest pain, palpitations  GI: NEGATIVE for abdominal pain, heartburn, nausea, vomiting or change in bowel pattern  ROS otherwise negative    Medications and Allergies:     Reviewed    Physical Exam:     Vitals were reviewed  Patient Vitals for the past 12 hrs:   BP Temp Temp src Pulse SpO2   04/02/24 0900 139/88 99  F (37.2  C) -- 78 96 %   04/02/24 0824 (!) 152/76 98.2  F (36.8  C) -- 93 95 %   04/02/24 0800 127/71 98.8  F (37.1  C) -- 92 96 %   04/02/24 0700 113/64 98.2  F (36.8  C) -- 80 92 %   04/02/24 0640 -- 98.2  F (36.8  C) -- 98 91 %   04/02/24 0624 -- 98.2  F (36.8  C) -- 97 (!) 89 %   04/02/24 0600 (!) 146/89 98.2  F (36.8  C) -- 81 92 %   04/02/24 0500 137/68 98.8  F (37.1  C) -- 82 93 %   04/02/24 0400 120/79 98.4  F (36.9  C) Bladder 79 95 %   04/02/24 0300 115/89 98.8  F (37.1  C) -- 87 92 %   04/02/24 3383  134/74 97.9  F (36.6  C) -- 99 91 %   04/02/24 0100 124/54 99.1  F (37.3  C) -- 81 94 %   04/02/24 0000 131/68 99.7  F (37.6  C) Bladder 98 93 %   04/01/24 2300 122/74 99.9  F (37.7  C) -- 96 92 %     Wt Readings from Last 4 Encounters:   04/01/24 55.2 kg (121 lb 12.8 oz)   01/08/24 49.8 kg (109 lb 12.8 oz)   12/12/23 50.8 kg (112 lb)   11/21/23 50.6 kg (111 lb 8 oz)     I/O last 3 completed shifts:  In: 1771.08 [I.V.:1521.08]  Out: 148 [Urine:93; Drains:50; Blood:5]    Vitals:    03/29/24 1431 03/29/24 1830 03/30/24 0600 03/31/24 0600   Weight: 49.9 kg (110 lb) 48 kg (105 lb 14.4 oz) 47.9 kg (105 lb 9.6 oz) 52.2 kg (115 lb 1.6 oz)    04/01/24 0600   Weight: 55.2 kg (121 lb 12.8 oz)       GENERAL: awake, alert, follows  HEENT: NC/AT, PERRLA, EOMI, non icteric, pharynx moist without lesion  RESP:  clear anteriorly  CV: RRR, normal S1 S2  ABDOMEN: soft, nontender, no HSM or masses and bowel sounds normal  MS: no clubbing, cyanosis   SKIN: clear without significant rashes or lesions  EXT: BKA     Data:     Recent Labs   Lab 04/02/24  0412 04/02/24  0404 04/02/24  0055 04/02/24  0049 04/01/24  2139 04/01/24  1801 04/01/24  1649 04/01/24  1419   NA  --  138  --  137  --  135  --  133*   POTASSIUM  --  4.8  --  4.9  --  5.3  --  5.3   CHLORIDE  --  102  --  103  --  105  --  105   CO2  --  19*  --  16*  --  14*  --  12*   ANIONGAP  --  17*  --  18*  --  16*  --  16*   GLC 67* 68* 72 68*   < > 78   < > 71   BUN  --  81.2*  --  78.5*  --  75.2*  --  73.9*   CR  --  3.43*  --  3.28*  --  2.98*  --  2.83*   GFRESTIMATED  --  14*  --  15*  --  17*  --  18*   SONIA  --  5.8*  --  5.8*  --  5.9*  --  6.0*    < > = values in this interval not displayed.         Recent Labs   Lab 04/02/24  0412 04/02/24  0404   NA  --  138   POTASSIUM  --  4.8   CHLORIDE  --  102   CO2  --  19*   ANIONGAP  --  17*   GLC 67* 68*   BUN  --  81.2*   CR  --  3.43*   GFRESTIMATED  --  14*   SONIA  --  5.8*     Recent Labs   Lab 04/02/24  0404  04/02/24  0049 04/01/24  1801 04/01/24  1419 04/01/24  0539 03/31/24  2338 03/31/24  0530 03/30/24  0500 03/29/24  1656   CR 3.43* 3.28* 2.98* 2.83* 2.58* 2.39* 1.61* 0.45* 0.67     Recent Labs   Lab 04/02/24  0404 04/02/24  0049 04/01/24  1801 04/01/24  1419 04/01/24  0539   POTASSIUM 4.8 4.9 5.3 5.3 4.7     No lab results found in last 7 days.  Recent Labs   Lab 03/31/24  2010   LACT 1.1     Recent Labs   Lab 04/02/24  0404 04/01/24  0539 03/31/24  0051 03/30/24  1207   HGB 6.7* 7.9* 8.5* 8.2*         G Nayan Barboza MD    [facility]phrology  [phone]

## 2024-04-02 NOTE — PLAN OF CARE
Problem: Adult Inpatient Plan of Care  Goal: Plan of Care Review  Description: The Plan of Care Review/Shift note should be completed every shift.  The Outcome Evaluation is a brief statement about your assessment that the patient is improving, declining, or no change.  This information will be displayed automatically on your shift  note.  Outcome: Not Progressing  Flowsheets (Taken 4/2/2024 1716)  Outcome Evaluation: pt alert and oriented to situation and self. pt does correctly answer intermittently that she's at [facility] and what the date is. but also occasionally asks if she's at home and is forgetful. gabapentin d/c'd until kidney function is improved in case that is aiding in confusion and sleepiness. 1 unit given this am of blood and hmg seth to 8.7. neph saw pt multiple times today and placed order for ir tunneled catheter tomorrow in likelihood of needing dialysis. labs checked approximately every 6 hours and they're worsening. pt weaned to 4l oximask but had to be increased again to 8 liters. leger to be kept in place for input/output measures. lung sounds with crackles and wheezing. groin sites clean and dry and femoral pulses great. pulses in left foot present. mottling on right below knee amp has not worsened since this morning. family updated at bedside throughout the day. pt up to chair x1 with pt and back to bed with sling. chest xray confirmed pulmondary edema and bumex given with little urine output response.  Plan of Care Reviewed With: patient  Overall Patient Progress: declining     Problem: Pain Acute  Goal: Optimal Pain Control and Function  Outcome: Progressing  Intervention: Develop Pain Management Plan  Recent Flowsheet Documentation  Taken 4/2/2024 0800 by Roxanne Shelton, RN  Pain Management Interventions:   pain pump in use   repositioned   rest   cold applied  Intervention: Prevent or Manage Pain  Recent Flowsheet Documentation  Taken 4/2/2024 1600 by Roxanne Shelton, RN  Medication  Review/Management:   medications reviewed   high-risk medications identified  Taken 4/2/2024 1200 by Roxanne Shelton RN  Medication Review/Management:   medications reviewed   high-risk medications identified  Taken 4/2/2024 0800 by Roxanne Shelton RN  Medication Review/Management:   medications reviewed   high-risk medications identified  Intervention: Optimize Psychosocial Wellbeing  Recent Flowsheet Documentation  Taken 4/2/2024 1600 by Roxanne Shelton RN  Supportive Measures:   active listening utilized   positive reinforcement provided   relaxation techniques promoted   verbalization of feelings encouraged  Taken 4/2/2024 1200 by Roxanne Shelton RN  Supportive Measures:   active listening utilized   positive reinforcement provided   relaxation techniques promoted   verbalization of feelings encouraged  Taken 4/2/2024 0800 by Roxanne Shelton RN  Supportive Measures:   active listening utilized   positive reinforcement provided   relaxation techniques promoted   verbalization of feelings encouraged     Problem: Thrombolytic Therapy  Goal: Absence of Bleeding  Outcome: Progressing  Intervention: Prevent and Manage Risk of Hemorrhage  Recent Flowsheet Documentation  Taken 4/2/2024 1600 by Roxanne Shelton RN  Bleeding Precautions:   blood pressure closely monitored   coagulation study results reviewed   monitored for signs of bleeding  Bleeding Management: dressing monitored  Taken 4/2/2024 1200 by Roxanne Shelton RN  Bleeding Precautions:   blood pressure closely monitored   coagulation study results reviewed   monitored for signs of bleeding  Bleeding Management: dressing monitored  Taken 4/2/2024 0800 by Roxanne Shelton RN  Bleeding Precautions:   blood pressure closely monitored   coagulation study results reviewed   monitored for signs of bleeding  Bleeding Management: dressing monitored     Problem: Thrombolytic Therapy  Goal: Absence of Bleeding  Outcome: Progressing  Intervention: Prevent and Manage  Risk of Hemorrhage  Recent Flowsheet Documentation  Taken 4/2/2024 1600 by Roxanne Shelton RN  Bleeding Precautions:   blood pressure closely monitored   coagulation study results reviewed   monitored for signs of bleeding  Bleeding Management: dressing monitored  Taken 4/2/2024 1200 by Roxanne Shelton RN  Bleeding Precautions:   blood pressure closely monitored   coagulation study results reviewed   monitored for signs of bleeding  Bleeding Management: dressing monitored  Taken 4/2/2024 0800 by Roxanne Shelton RN  Bleeding Precautions:   blood pressure closely monitored   coagulation study results reviewed   monitored for signs of bleeding  Bleeding Management: dressing monitored   Goal Outcome Evaluation:      Plan of Care Reviewed With: patient    Overall Patient Progress: decliningOverall Patient Progress: declining    Outcome Evaluation: pt alert and oriented to situation and self. pt does correctly answer intermittently that she's at [facility] and what the date is. but also occasionally asks if she's at home and is forgetful. gabapentin d/c'd until kidney function is improved in case that is aiding in confusion and sleepiness. 1 unit given this am of blood and hmg seth to 8.7. neph saw pt multiple times today and placed order for ir tunneled catheter tomorrow in likelihood of needing dialysis. labs checked approximately every 6 hours and they're worsening. pt weaned to 4l oximask but had to be increased again to 8 liters. leger to be kept in place for input/output measures. lung sounds with crackles and wheezing. groin sites clean and dry and femoral pulses great. pulses in left foot present. mottling on right below knee amp has not worsened since this morning. family updated at bedside throughout the day. pt up to chair x1 with pt and back to bed with sling. chest xray confirmed pulmondary edema and bumex given with little urine output response.

## 2024-04-03 ENCOUNTER — APPOINTMENT (OUTPATIENT)
Dept: ULTRASOUND IMAGING | Facility: CLINIC | Age: 64
DRG: 270 | End: 2024-04-03
Attending: STUDENT IN AN ORGANIZED HEALTH CARE EDUCATION/TRAINING PROGRAM
Payer: COMMERCIAL

## 2024-04-03 ENCOUNTER — TELEPHONE (OUTPATIENT)
Dept: OTHER | Facility: CLINIC | Age: 64
End: 2024-04-03
Payer: COMMERCIAL

## 2024-04-03 ENCOUNTER — APPOINTMENT (OUTPATIENT)
Dept: GENERAL RADIOLOGY | Facility: CLINIC | Age: 64
DRG: 270 | End: 2024-04-03
Payer: COMMERCIAL

## 2024-04-03 ENCOUNTER — APPOINTMENT (OUTPATIENT)
Dept: CT IMAGING | Facility: CLINIC | Age: 64
DRG: 270 | End: 2024-04-03
Attending: STUDENT IN AN ORGANIZED HEALTH CARE EDUCATION/TRAINING PROGRAM
Payer: COMMERCIAL

## 2024-04-03 ENCOUNTER — APPOINTMENT (OUTPATIENT)
Dept: INTERVENTIONAL RADIOLOGY/VASCULAR | Facility: CLINIC | Age: 64
DRG: 270 | End: 2024-04-03
Attending: INTERNAL MEDICINE
Payer: COMMERCIAL

## 2024-04-03 DIAGNOSIS — N17.9 AKI (ACUTE KIDNEY INJURY) (H): Primary | ICD-10-CM

## 2024-04-03 LAB
ANION GAP SERPL CALCULATED.3IONS-SCNC: 16 MMOL/L (ref 7–15)
ANION GAP SERPL CALCULATED.3IONS-SCNC: 17 MMOL/L (ref 7–15)
ANION GAP SERPL CALCULATED.3IONS-SCNC: 17 MMOL/L (ref 7–15)
ANION GAP SERPL CALCULATED.3IONS-SCNC: 19 MMOL/L (ref 7–15)
BASE EXCESS BLDV CALC-SCNC: -1.4 MMOL/L (ref -3–3)
BASE EXCESS BLDV CALC-SCNC: -2.3 MMOL/L (ref -3–3)
BASE EXCESS BLDV CALC-SCNC: -2.3 MMOL/L (ref -3–3)
BASE EXCESS BLDV CALC-SCNC: 0.3 MMOL/L (ref -3–3)
BUN SERPL-MCNC: 44.5 MG/DL (ref 8–23)
BUN SERPL-MCNC: 87.8 MG/DL (ref 8–23)
BUN SERPL-MCNC: 89.3 MG/DL (ref 8–23)
BUN SERPL-MCNC: 93.4 MG/DL (ref 8–23)
CALCIUM SERPL-MCNC: 5.9 MG/DL (ref 8.8–10.2)
CALCIUM SERPL-MCNC: 6.2 MG/DL (ref 8.8–10.2)
CALCIUM SERPL-MCNC: 6.4 MG/DL (ref 8.8–10.2)
CALCIUM SERPL-MCNC: 7 MG/DL (ref 8.8–10.2)
CHLORIDE SERPL-SCNC: 100 MMOL/L (ref 98–107)
CHLORIDE SERPL-SCNC: 101 MMOL/L (ref 98–107)
CHLORIDE SERPL-SCNC: 99 MMOL/L (ref 98–107)
CHLORIDE SERPL-SCNC: 99 MMOL/L (ref 98–107)
CK SERPL-CCNC: 4766 U/L (ref 26–192)
CK SERPL-CCNC: 5578 U/L (ref 26–192)
CK SERPL-CCNC: 6227 U/L (ref 26–192)
CK SERPL-CCNC: 6469 U/L (ref 26–192)
CREAT SERPL-MCNC: 2.4 MG/DL (ref 0.51–0.95)
CREAT SERPL-MCNC: 3.92 MG/DL (ref 0.51–0.95)
CREAT SERPL-MCNC: 4.02 MG/DL (ref 0.51–0.95)
CREAT SERPL-MCNC: 4.22 MG/DL (ref 0.51–0.95)
DEPRECATED HCO3 PLAS-SCNC: 21 MMOL/L (ref 22–29)
DEPRECATED HCO3 PLAS-SCNC: 22 MMOL/L (ref 22–29)
EGFRCR SERPLBLD CKD-EPI 2021: 11 ML/MIN/1.73M2
EGFRCR SERPLBLD CKD-EPI 2021: 12 ML/MIN/1.73M2
EGFRCR SERPLBLD CKD-EPI 2021: 12 ML/MIN/1.73M2
EGFRCR SERPLBLD CKD-EPI 2021: 22 ML/MIN/1.73M2
ERYTHROCYTE [DISTWIDTH] IN BLOOD BY AUTOMATED COUNT: 15.3 % (ref 10–15)
GLUCOSE BLDC GLUCOMTR-MCNC: 109 MG/DL (ref 70–99)
GLUCOSE BLDC GLUCOMTR-MCNC: 110 MG/DL (ref 70–99)
GLUCOSE BLDC GLUCOMTR-MCNC: 120 MG/DL (ref 70–99)
GLUCOSE BLDC GLUCOMTR-MCNC: 150 MG/DL (ref 70–99)
GLUCOSE BLDC GLUCOMTR-MCNC: 64 MG/DL (ref 70–99)
GLUCOSE BLDC GLUCOMTR-MCNC: 90 MG/DL (ref 70–99)
GLUCOSE BLDC GLUCOMTR-MCNC: 91 MG/DL (ref 70–99)
GLUCOSE BLDC GLUCOMTR-MCNC: 99 MG/DL (ref 70–99)
GLUCOSE SERPL-MCNC: 111 MG/DL (ref 70–99)
GLUCOSE SERPL-MCNC: 80 MG/DL (ref 70–99)
GLUCOSE SERPL-MCNC: 92 MG/DL (ref 70–99)
GLUCOSE SERPL-MCNC: 96 MG/DL (ref 70–99)
HBV SURFACE AB SERPL IA-ACNC: <3.5 M[IU]/ML
HBV SURFACE AB SERPL IA-ACNC: NONREACTIVE M[IU]/ML
HBV SURFACE AG SERPL QL IA: NONREACTIVE
HCO3 BLDV-SCNC: 24 MMOL/L (ref 21–28)
HCO3 BLDV-SCNC: 25 MMOL/L (ref 21–28)
HCO3 BLDV-SCNC: 25 MMOL/L (ref 21–28)
HCO3 BLDV-SCNC: 27 MMOL/L (ref 21–28)
HCT VFR BLD AUTO: 25.5 % (ref 35–47)
HGB BLD-MCNC: 8.8 G/DL (ref 11.7–15.7)
MCH RBC QN AUTO: 28.7 PG (ref 26.5–33)
MCHC RBC AUTO-ENTMCNC: 34.5 G/DL (ref 31.5–36.5)
MCV RBC AUTO: 83 FL (ref 78–100)
O2/TOTAL GAS SETTING VFR VENT: 48 %
O2/TOTAL GAS SETTING VFR VENT: 48 %
O2/TOTAL GAS SETTING VFR VENT: 55 %
O2/TOTAL GAS SETTING VFR VENT: 55 %
OXYHGB MFR BLDV: 75 % (ref 70–75)
OXYHGB MFR BLDV: 76 % (ref 70–75)
OXYHGB MFR BLDV: 78 % (ref 70–75)
OXYHGB MFR BLDV: 86 % (ref 70–75)
PATH REPORT.COMMENTS IMP SPEC: NORMAL
PATH REPORT.COMMENTS IMP SPEC: NORMAL
PATH REPORT.FINAL DX SPEC: NORMAL
PATH REPORT.GROSS SPEC: NORMAL
PATH REPORT.MICROSCOPIC SPEC OTHER STN: NORMAL
PATH REPORT.RELEVANT HX SPEC: NORMAL
PCO2 BLDV: 49 MM HG (ref 40–50)
PCO2 BLDV: 50 MM HG (ref 40–50)
PCO2 BLDV: 51 MM HG (ref 40–50)
PCO2 BLDV: 52 MM HG (ref 40–50)
PH BLDV: 7.29 [PH] (ref 7.32–7.43)
PH BLDV: 7.3 [PH] (ref 7.32–7.43)
PH BLDV: 7.31 [PH] (ref 7.32–7.43)
PH BLDV: 7.34 [PH] (ref 7.32–7.43)
PHOTO IMAGE: NORMAL
PLATELET # BLD AUTO: 57 10E3/UL (ref 150–450)
PO2 BLDV: 45 MM HG (ref 25–47)
PO2 BLDV: 46 MM HG (ref 25–47)
PO2 BLDV: 48 MM HG (ref 25–47)
PO2 BLDV: 58 MM HG (ref 25–47)
POTASSIUM SERPL-SCNC: 3.6 MMOL/L (ref 3.4–5.3)
POTASSIUM SERPL-SCNC: 4.9 MMOL/L (ref 3.4–5.3)
POTASSIUM SERPL-SCNC: 4.9 MMOL/L (ref 3.4–5.3)
POTASSIUM SERPL-SCNC: 5 MMOL/L (ref 3.4–5.3)
RBC # BLD AUTO: 3.07 10E6/UL (ref 3.8–5.2)
SAO2 % BLDV: 76.6 % (ref 70–75)
SAO2 % BLDV: 77.4 % (ref 70–75)
SAO2 % BLDV: 79.6 % (ref 70–75)
SAO2 % BLDV: 87.7 % (ref 70–75)
SODIUM SERPL-SCNC: 138 MMOL/L (ref 135–145)
SODIUM SERPL-SCNC: 138 MMOL/L (ref 135–145)
SODIUM SERPL-SCNC: 139 MMOL/L (ref 135–145)
SODIUM SERPL-SCNC: 140 MMOL/L (ref 135–145)
WBC # BLD AUTO: 9.2 10E3/UL (ref 4–11)

## 2024-04-03 PROCEDURE — P9047 ALBUMIN (HUMAN), 25%, 50ML: HCPCS | Performed by: INTERNAL MEDICINE

## 2024-04-03 PROCEDURE — 74176 CT ABD & PELVIS W/O CONTRAST: CPT

## 2024-04-03 PROCEDURE — 272N000196 HC ACCESSORY CR5

## 2024-04-03 PROCEDURE — 250N000011 HC RX IP 250 OP 636: Performed by: NURSE PRACTITIONER

## 2024-04-03 PROCEDURE — 90935 HEMODIALYSIS ONE EVALUATION: CPT | Performed by: INTERNAL MEDICINE

## 2024-04-03 PROCEDURE — 999N000157 HC STATISTIC RCP TIME EA 10 MIN

## 2024-04-03 PROCEDURE — 90935 HEMODIALYSIS ONE EVALUATION: CPT

## 2024-04-03 PROCEDURE — 82550 ASSAY OF CK (CPK): CPT | Performed by: STUDENT IN AN ORGANIZED HEALTH CARE EDUCATION/TRAINING PROGRAM

## 2024-04-03 PROCEDURE — 250N000013 HC RX MED GY IP 250 OP 250 PS 637: Performed by: STUDENT IN AN ORGANIZED HEALTH CARE EDUCATION/TRAINING PROGRAM

## 2024-04-03 PROCEDURE — 82805 BLOOD GASES W/O2 SATURATION: CPT | Performed by: STUDENT IN AN ORGANIZED HEALTH CARE EDUCATION/TRAINING PROGRAM

## 2024-04-03 PROCEDURE — 99233 SBSQ HOSP IP/OBS HIGH 50: CPT | Performed by: STUDENT IN AN ORGANIZED HEALTH CARE EDUCATION/TRAINING PROGRAM

## 2024-04-03 PROCEDURE — 999N000065 XR ABDOMEN PORT 1 VIEW

## 2024-04-03 PROCEDURE — 94640 AIRWAY INHALATION TREATMENT: CPT | Mod: 76

## 2024-04-03 PROCEDURE — 99231 SBSQ HOSP IP/OBS SF/LOW 25: CPT | Mod: 24 | Performed by: SURGERY

## 2024-04-03 PROCEDURE — 85027 COMPLETE CBC AUTOMATED: CPT | Performed by: STUDENT IN AN ORGANIZED HEALTH CARE EDUCATION/TRAINING PROGRAM

## 2024-04-03 PROCEDURE — 250N000011 HC RX IP 250 OP 636: Performed by: INTERNAL MEDICINE

## 2024-04-03 PROCEDURE — 250N000011 HC RX IP 250 OP 636: Performed by: RADIOLOGY

## 2024-04-03 PROCEDURE — 93930 UPPER EXTREMITY STUDY: CPT

## 2024-04-03 PROCEDURE — 250N000009 HC RX 250

## 2024-04-03 PROCEDURE — 250N000009 HC RX 250: Performed by: NURSE PRACTITIONER

## 2024-04-03 PROCEDURE — 80048 BASIC METABOLIC PNL TOTAL CA: CPT | Performed by: STUDENT IN AN ORGANIZED HEALTH CARE EDUCATION/TRAINING PROGRAM

## 2024-04-03 PROCEDURE — 94640 AIRWAY INHALATION TREATMENT: CPT

## 2024-04-03 PROCEDURE — C1750 CATH, HEMODIALYSIS,LONG-TERM: HCPCS

## 2024-04-03 PROCEDURE — 99152 MOD SED SAME PHYS/QHP 5/>YRS: CPT

## 2024-04-03 PROCEDURE — 200N000001 HC R&B ICU

## 2024-04-03 PROCEDURE — C1769 GUIDE WIRE: HCPCS

## 2024-04-03 PROCEDURE — 5A1D70Z PERFORMANCE OF URINARY FILTRATION, INTERMITTENT, LESS THAN 6 HOURS PER DAY: ICD-10-PCS | Performed by: INTERNAL MEDICINE

## 2024-04-03 PROCEDURE — 250N000011 HC RX IP 250 OP 636: Performed by: STUDENT IN AN ORGANIZED HEALTH CARE EDUCATION/TRAINING PROGRAM

## 2024-04-03 PROCEDURE — 258N000003 HC RX IP 258 OP 636: Performed by: INTERNAL MEDICINE

## 2024-04-03 PROCEDURE — 86706 HEP B SURFACE ANTIBODY: CPT | Performed by: INTERNAL MEDICINE

## 2024-04-03 PROCEDURE — 258N000001 HC RX 258: Performed by: STUDENT IN AN ORGANIZED HEALTH CARE EDUCATION/TRAINING PROGRAM

## 2024-04-03 PROCEDURE — 99231 SBSQ HOSP IP/OBS SF/LOW 25: CPT | Mod: 24

## 2024-04-03 PROCEDURE — 87340 HEPATITIS B SURFACE AG IA: CPT | Performed by: INTERNAL MEDICINE

## 2024-04-03 PROCEDURE — 258N000003 HC RX IP 258 OP 636: Performed by: STUDENT IN AN ORGANIZED HEALTH CARE EDUCATION/TRAINING PROGRAM

## 2024-04-03 PROCEDURE — 250N000009 HC RX 250: Performed by: STUDENT IN AN ORGANIZED HEALTH CARE EDUCATION/TRAINING PROGRAM

## 2024-04-03 RX ORDER — CLINDAMYCIN PHOSPHATE 900 MG/50ML
900 INJECTION, SOLUTION INTRAVENOUS
Status: COMPLETED | OUTPATIENT
Start: 2024-04-03 | End: 2024-04-03

## 2024-04-03 RX ORDER — NALOXONE HYDROCHLORIDE 0.4 MG/ML
0.4 INJECTION, SOLUTION INTRAMUSCULAR; INTRAVENOUS; SUBCUTANEOUS
Status: DISCONTINUED | OUTPATIENT
Start: 2024-04-03 | End: 2024-04-03 | Stop reason: HOSPADM

## 2024-04-03 RX ORDER — FLUMAZENIL 0.1 MG/ML
0.2 INJECTION, SOLUTION INTRAVENOUS
Status: DISCONTINUED | OUTPATIENT
Start: 2024-04-03 | End: 2024-04-03 | Stop reason: HOSPADM

## 2024-04-03 RX ORDER — DEXTROSE MONOHYDRATE 50 MG/ML
INJECTION, SOLUTION INTRAVENOUS CONTINUOUS
Status: DISCONTINUED | OUTPATIENT
Start: 2024-04-03 | End: 2024-04-04

## 2024-04-03 RX ORDER — DEXTROSE MONOHYDRATE 50 MG/ML
INJECTION, SOLUTION INTRAVENOUS CONTINUOUS
Status: DISCONTINUED | OUTPATIENT
Start: 2024-04-03 | End: 2024-04-03

## 2024-04-03 RX ORDER — WATER 10 ML/10ML
INJECTION INTRAMUSCULAR; INTRAVENOUS; SUBCUTANEOUS
Status: COMPLETED
Start: 2024-04-03 | End: 2024-04-03

## 2024-04-03 RX ORDER — NALOXONE HYDROCHLORIDE 0.4 MG/ML
0.2 INJECTION, SOLUTION INTRAMUSCULAR; INTRAVENOUS; SUBCUTANEOUS
Status: DISCONTINUED | OUTPATIENT
Start: 2024-04-03 | End: 2024-04-03 | Stop reason: HOSPADM

## 2024-04-03 RX ORDER — CALCIUM GLUCONATE 20 MG/ML
2 INJECTION, SOLUTION INTRAVENOUS ONCE
Status: COMPLETED | OUTPATIENT
Start: 2024-04-03 | End: 2024-04-03

## 2024-04-03 RX ORDER — HEPARIN SODIUM 1000 [USP'U]/ML
4000 INJECTION, SOLUTION INTRAVENOUS; SUBCUTANEOUS ONCE
Status: COMPLETED | OUTPATIENT
Start: 2024-04-03 | End: 2024-04-03

## 2024-04-03 RX ORDER — OLANZAPINE 10 MG/2ML
5 INJECTION, POWDER, FOR SOLUTION INTRAMUSCULAR DAILY PRN
Status: DISCONTINUED | OUTPATIENT
Start: 2024-04-03 | End: 2024-04-22 | Stop reason: HOSPADM

## 2024-04-03 RX ORDER — FENTANYL CITRATE 50 UG/ML
25-50 INJECTION, SOLUTION INTRAMUSCULAR; INTRAVENOUS EVERY 5 MIN PRN
Status: DISCONTINUED | OUTPATIENT
Start: 2024-04-03 | End: 2024-04-03 | Stop reason: HOSPADM

## 2024-04-03 RX ORDER — LIDOCAINE HYDROCHLORIDE 20 MG/ML
5 SOLUTION OROPHARYNGEAL ONCE
Status: COMPLETED | OUTPATIENT
Start: 2024-04-03 | End: 2024-04-03

## 2024-04-03 RX ORDER — ALBUMIN (HUMAN) 12.5 G/50ML
12.5 SOLUTION INTRAVENOUS ONCE
Status: COMPLETED | OUTPATIENT
Start: 2024-04-03 | End: 2024-04-03

## 2024-04-03 RX ORDER — DIPHENHYDRAMINE HYDROCHLORIDE 50 MG/ML
50 INJECTION INTRAMUSCULAR; INTRAVENOUS
Status: DISCONTINUED | OUTPATIENT
Start: 2024-04-03 | End: 2024-04-05

## 2024-04-03 RX ORDER — OLANZAPINE 10 MG/2ML
2.5 INJECTION, POWDER, FOR SOLUTION INTRAMUSCULAR ONCE
Status: COMPLETED | OUTPATIENT
Start: 2024-04-03 | End: 2024-04-03

## 2024-04-03 RX ADMIN — ALBUTEROL SULFATE 2.5 MG: 2.5 SOLUTION RESPIRATORY (INHALATION) at 07:39

## 2024-04-03 RX ADMIN — DEXTROSE MONOHYDRATE: 50 INJECTION, SOLUTION INTRAVENOUS at 17:06

## 2024-04-03 RX ADMIN — HYDROMORPHONE HYDROCHLORIDE 0.1 MG: 0.2 INJECTION, SOLUTION INTRAMUSCULAR; INTRAVENOUS; SUBCUTANEOUS at 14:02

## 2024-04-03 RX ADMIN — SODIUM CHLORIDE 300 ML: 9 INJECTION, SOLUTION INTRAVENOUS at 14:44

## 2024-04-03 RX ADMIN — HEPARIN SODIUM 4000 UNITS: 1000 INJECTION INTRAVENOUS; SUBCUTANEOUS at 10:10

## 2024-04-03 RX ADMIN — LIDOCAINE HYDROCHLORIDE 20 ML: 10 INJECTION, SOLUTION INFILTRATION; PERINEURAL at 10:09

## 2024-04-03 RX ADMIN — CALCIUM GLUCONATE 2 G: 20 INJECTION, SOLUTION INTRAVENOUS at 04:00

## 2024-04-03 RX ADMIN — HYDROMORPHONE HYDROCHLORIDE 0.1 MG: 0.2 INJECTION, SOLUTION INTRAMUSCULAR; INTRAVENOUS; SUBCUTANEOUS at 12:59

## 2024-04-03 RX ADMIN — WATER 10 ML: 1 INJECTION INTRAMUSCULAR; INTRAVENOUS; SUBCUTANEOUS at 06:39

## 2024-04-03 RX ADMIN — HYDRALAZINE HYDROCHLORIDE 10 MG: 20 INJECTION INTRAMUSCULAR; INTRAVENOUS at 06:09

## 2024-04-03 RX ADMIN — HEPARIN SODIUM 1600 UNITS: 1000 INJECTION INTRAVENOUS; SUBCUTANEOUS at 17:13

## 2024-04-03 RX ADMIN — OXYCODONE HYDROCHLORIDE 2.5 MG: 5 TABLET ORAL at 22:58

## 2024-04-03 RX ADMIN — SODIUM CHLORIDE 250 ML: 9 INJECTION, SOLUTION INTRAVENOUS at 14:43

## 2024-04-03 RX ADMIN — ALBUTEROL SULFATE 2.5 MG: 2.5 SOLUTION RESPIRATORY (INHALATION) at 19:06

## 2024-04-03 RX ADMIN — ALBUMIN HUMAN 12.5 G: 0.25 SOLUTION INTRAVENOUS at 14:49

## 2024-04-03 RX ADMIN — ALBUTEROL SULFATE 2.5 MG: 2.5 SOLUTION RESPIRATORY (INHALATION) at 01:06

## 2024-04-03 RX ADMIN — MIDAZOLAM 1 MG: 1 INJECTION INTRAMUSCULAR; INTRAVENOUS at 10:00

## 2024-04-03 RX ADMIN — ACETAMINOPHEN 975 MG: 325 TABLET, FILM COATED ORAL at 22:45

## 2024-04-03 RX ADMIN — DEXTROSE MONOHYDRATE 25 ML: 25 INJECTION, SOLUTION INTRAVENOUS at 16:48

## 2024-04-03 RX ADMIN — OLANZAPINE 2.5 MG: 10 INJECTION, POWDER, FOR SOLUTION INTRAMUSCULAR at 06:30

## 2024-04-03 RX ADMIN — NICOTINE 1 PATCH: 14 PATCH, EXTENDED RELEASE TRANSDERMAL at 08:01

## 2024-04-03 RX ADMIN — FENTANYL CITRATE 25 MCG: 50 INJECTION, SOLUTION INTRAMUSCULAR; INTRAVENOUS at 10:03

## 2024-04-03 RX ADMIN — SODIUM CHLORIDE 1 BAG: 9 INJECTION, SOLUTION INTRAVENOUS at 10:01

## 2024-04-03 RX ADMIN — HYDROMORPHONE HYDROCHLORIDE 0.2 MG: 0.2 INJECTION, SOLUTION INTRAMUSCULAR; INTRAVENOUS; SUBCUTANEOUS at 20:47

## 2024-04-03 RX ADMIN — HYDROMORPHONE HYDROCHLORIDE 0.2 MG: 0.2 INJECTION, SOLUTION INTRAMUSCULAR; INTRAVENOUS; SUBCUTANEOUS at 16:42

## 2024-04-03 RX ADMIN — CLINDAMYCIN PHOSPHATE 900 MG: 900 INJECTION, SOLUTION INTRAVENOUS at 09:50

## 2024-04-03 RX ADMIN — ALBUTEROL SULFATE 2.5 MG: 2.5 SOLUTION RESPIRATORY (INHALATION) at 14:31

## 2024-04-03 ASSESSMENT — ACTIVITIES OF DAILY LIVING (ADL)
ADLS_ACUITY_SCORE: 40
ADLS_ACUITY_SCORE: 38
ADLS_ACUITY_SCORE: 40
ADLS_ACUITY_SCORE: 38
ADLS_ACUITY_SCORE: 40
ADLS_ACUITY_SCORE: 38
ADLS_ACUITY_SCORE: 40
ADLS_ACUITY_SCORE: 38
ADLS_ACUITY_SCORE: 40

## 2024-04-03 NOTE — CONSULTS
Patient is on IR schedule today Wednesday 4/3/24 for a Tunneled dialysis catheter placement with IV moderate sedation.     Shirley Hendricks is a 63 year old woman with a recent history of acute occlusion of right fem to anterior tibial PTFE/cadaveric SFA graft.  Initial heparin and lytics with angiogram notable for patent bypass but bleeding in groin resolved with Viabahn stent placement and embolization of side branch but with persistent decreased outflow and inability to tolerate further lytics due to continued hypofibrinogenemia, hematochezia, and worsening clinical status of right foot. Now s/p right through knee guillotine amp on 4/1    She has acute renal injury suspected from recent IV contrast for angiograms and elevated CK with decreased UO. Creatinine continues to worsen and the patient is becoming more confused. A dialysis catheter placement has been requested.     -Labs WNL for procedure.    -Orders for NPO and antibiotics have been entered.   -Phone procedural education reviewed with galina Toribio and Yue in detail including, but not limited to risks, benefits and alternatives, questions answered with understanding verbalized by them and consent is in IR.     ROUTINE ICU LABS (Last four results)  CMP  Recent Labs   Lab 04/03/24  0754 04/03/24  0431 04/03/24  0423 04/03/24  0116 04/02/24  1850 04/02/24  1823 04/02/24  1432 04/02/24  1311   NA  --   --  138 139  --  146*  --  139   POTASSIUM  --   --  4.9 4.9  --  4.7  --  4.8   CHLORIDE  --   --  99 101  --  102  --  102   CO2  --   --  22 22  --  30*  --  20*   ANIONGAP  --   --  17* 16*  --  14  --  17*   GLC 99 91 92 96   < > 108*   < > 116*   BUN  --   --  89.3* 87.8*  --  84.8*  --  85.0*   CR  --   --  4.02* 3.92*  --  3.64*  --  3.60*   GFRESTIMATED  --   --  12* 12*  --  13*  --  13*   SONIA  --   --  6.2* 5.9*  --  5.8*  --  6.0*    < > = values in this interval not displayed.     CBC  Recent Labs   Lab 04/03/24  0423 04/02/24  1202  04/02/24  0404 04/01/24  0539   WBC 9.2 7.5 6.5 4.0   RBC 3.07* 3.04* 2.38* 2.77*   HGB 8.8* 8.7* 6.7* 7.9*   HCT 25.5* 25.0* 19.7* 23.1*   MCV 83 82 83 83   MCH 28.7 28.6 28.2 28.5   MCHC 34.5 34.8 34.0 34.2   RDW 15.3* 15.3* 16.0* 15.8*   PLT 57* 63* 59* 62*     INRNo lab results found in last 7 days.  Arterial Blood Gas  Recent Labs   Lab 04/03/24  0423 04/03/24  0116 04/02/24  1823 04/02/24  1201 03/31/24  2338 03/31/24 2010   PH  --   --   --   --   --  7.19*   PCO2  --   --   --   --   --  35   PO2  --   --   --   --   --  58*   HCO3  --   --   --   --   --  13*   O2PER 55 55 55 6   < > 90    < > = values in this interval not displayed.       Please contact the IR department at [phone] for procedural related questions.     Discussed with IR Dr Salcedo today.    Total time: 30 minutes    Thanks, Maribell [facility] Interventional Radiology CNP ([phone]) (phone [phone])

## 2024-04-03 NOTE — PROGRESS NOTES
Paged hospitalist and vascular about pt status.   Pt loss dexterity in hands and unable to move fingers. Pt reports hallucinations and confusion. Hospitalist/vasular believe it is acute delirium and increase in creat.

## 2024-04-03 NOTE — PLAN OF CARE
"  Problem: Adult Inpatient Plan of Care  Goal: Plan of Care Review  Description: The Plan of Care Review/Shift note should be completed every shift.  The Outcome Evaluation is a brief statement about your assessment that the patient is improving, declining, or no change.  This information will be displayed automatically on your shift  note.  Outcome: Not Progressing  Flowsheets (Taken 4/3/2024 1656)  Outcome Evaluation: Pt remains confused and has episodes where she can answer orientation questions and periods where she cannot. Intermitently follows commands. Dilaudid given for pain. Mitts maintained due to acute confusion and pulling at lines. O2 requirements decreased to oxymask and 5-10L. Lungs coarse with pt having productive cough. NPO. no BM this shift. Alvarez with minimal output. MD aware. dialysis pulled 2L fluid today. Hospitalist ordered abd CT due to abdominal pain. Pulses remain the same. Pt hypoglycemic this evening. dextrose push given and d5 gtt started. Family updated at bedside. Plan on calling [facility] again this evening before end of shift.  Plan of Care Reviewed With: patient  Overall Patient Progress: no change  Goal: Patient-Specific Goal (Individualized)  Description: You can add care plan individualizations to a care plan. Examples of Individualization might be:  \"Parent requests to be called daily at 9am for status\", \"I have a hard time hearing out of my right ear\", or \"Do not touch me to wake me up as it startles  me\".  Outcome: Not Progressing  Flowsheets (Taken 4/3/2024 1656)  Anxieties, Fears or Concerns: anxiety     Problem: Adult Inpatient Plan of Care  Goal: Absence of Hospital-Acquired Illness or Injury  Outcome: Progressing  Intervention: Identify and Manage Fall Risk  Recent Flowsheet Documentation  Taken 4/3/2024 1600 by Mckayla Valera RN  Safety Promotion/Fall Prevention:   lighting adjusted   room door open   room near nurse's station   room organization consistent   safety " round/check completed   patient and family education  Taken 4/3/2024 1200 by Mckayla Valera RN  Safety Promotion/Fall Prevention:   lighting adjusted   room door open   room near nurse's station   room organization consistent   safety round/check completed   patient and family education  Taken 4/3/2024 0800 by Mckayla Valera RN  Safety Promotion/Fall Prevention:   lighting adjusted   room door open   room near nurse's station   room organization consistent   safety round/check completed   patient and family education  Intervention: Prevent Skin Injury  Recent Flowsheet Documentation  Taken 4/3/2024 1600 by Mckayla Valera RN  Body Position:   turned   lower extremity elevated   upper extremity elevated   right  Skin Protection:   adhesive use limited   incontinence pads utilized   silicone foam dressing in place   skin to device areas padded   skin to skin areas padded   transparent dressing maintained  Device Skin Pressure Protection:   absorbent pad utilized/changed   adhesive use limited   positioning supports utilized   pressure points protected   skin-to-device areas padded   skin-to-skin areas padded   tubing/devices free from skin contact  Taken 4/3/2024 1400 by Mckayla Valera RN  Body Position:   turned   lower extremity elevated   upper extremity elevated   left  Taken 4/3/2024 1200 by Mckayla Valera RN  Body Position:   turned   lower extremity elevated   upper extremity elevated   right  Skin Protection:   adhesive use limited   incontinence pads utilized   silicone foam dressing in place   skin to device areas padded   skin to skin areas padded   transparent dressing maintained  Device Skin Pressure Protection:   absorbent pad utilized/changed   adhesive use limited   positioning supports utilized   pressure points protected   skin-to-device areas padded   skin-to-skin areas padded   tubing/devices free from skin contact  Taken 4/3/2024 1025 by Mckayla Valera RN  Body Position:   turned    lower extremity elevated   upper extremity elevated   left  Taken 4/3/2024 0800 by Mckayla Valera RN  Body Position:   turned   lower extremity elevated   upper extremity elevated   right  Skin Protection:   adhesive use limited   incontinence pads utilized   silicone foam dressing in place   skin to device areas padded   skin to skin areas padded   transparent dressing maintained  Device Skin Pressure Protection:   absorbent pad utilized/changed   adhesive use limited   positioning supports utilized   pressure points protected   skin-to-device areas padded   skin-to-skin areas padded   tubing/devices free from skin contact  Intervention: Prevent and Manage VTE (Venous Thromboembolism) Risk  Recent Flowsheet Documentation  Taken 4/3/2024 1600 by Mckayla Valera RN  VTE Prevention/Management: SCDs (sequential compression devices) off  Taken 4/3/2024 1200 by Mckayla Valera RN  VTE Prevention/Management: SCDs (sequential compression devices) off  Taken 4/3/2024 0800 by Mckayla Valera RN  VTE Prevention/Management: SCDs (sequential compression devices) off  Intervention: Prevent Infection  Recent Flowsheet Documentation  Taken 4/3/2024 1600 by Mckayla Valera RN  Infection Prevention:   environmental surveillance performed   equipment surfaces disinfected   hand hygiene promoted   rest/sleep promoted   single patient room provided  Taken 4/3/2024 1200 by Mckayla Valera RN  Infection Prevention:   environmental surveillance performed   equipment surfaces disinfected   hand hygiene promoted   rest/sleep promoted   single patient room provided  Taken 4/3/2024 0800 by Mckayla Valera RN  Infection Prevention:   environmental surveillance performed   equipment surfaces disinfected   hand hygiene promoted   rest/sleep promoted   single patient room provided  Goal: Optimal Comfort and Wellbeing  Outcome: Progressing  Intervention: Provide Person-Centered Care  Recent Flowsheet Documentation  Taken 4/3/2024 1600 by  Mckayla Valera RN  Trust Relationship/Rapport:   care explained   choices provided   questions answered   questions encouraged  Taken 4/3/2024 1200 by Mckayla Valera RN  Trust Relationship/Rapport:   care explained   choices provided   questions answered   questions encouraged  Taken 4/3/2024 0800 by Mckayla Valera RN  Trust Relationship/Rapport:   care explained   choices provided   questions answered   questions encouraged   Goal Outcome Evaluation:      Plan of Care Reviewed With: patient    Overall Patient Progress: no changeOverall Patient Progress: no change    Outcome Evaluation: Pt remains confused and has episodes where she can answer orientation questions and periods where she cannot. Intermitently follows commands. Dilaudid given for pain. Mitts maintained due to acute confusion and pulling at lines. O2 requirements decreased to oxymask and 5-10L. Lungs coarse with pt having productive cough. NPO. no BM this shift. Alvarez with minimal output. MD aware. dialysis pulled 2L fluid today. Hospitalist ordered abd CT due to abdominal pain. Pulses remain the same. Pt hypoglycemic this evening. dextrose push given and d5 gtt started. Family updated at bedside. Plan on calling [facility] again this evening before end of shift.  Post pyloric TF placed this evening. Awaiting Xray to verify placement.

## 2024-04-03 NOTE — PRE-PROCEDURE
GENERAL PRE-PROCEDURE:   Procedure:  Tunneled dialysis catheter placement with moderate sedation  Date/Time:  4/3/2024 9:21 AM    Written consent obtained?: Yes    Risks and benefits: Risks, benefits and alternatives were discussed    Consent given by:  Patient  Patient states understanding of procedure being performed: Yes    Patient's understanding of procedure matches consent: Yes    Procedure consent matches procedure scheduled: Yes    Expected level of sedation:  Moderate  Appropriately NPO:  Yes  ASA Class:  3  Mallampati  :  Grade 4- soft palate obscured by base of tongue  Lungs:  Other (comment)  Lung exam comment:  Coarse, few wheezes bilaterally, occasional non productive cough  Heart:  Normal heart sounds with tachycardia  History & Physical reviewed:  History and physical reviewed and no updates needed  Statement of review:  I have reviewed the lab findings, diagnostic data, medications, and the plan for sedation

## 2024-04-03 NOTE — PROGRESS NOTES
Pt with frequent desaturations on 10-12 lpm oxymask. Placed back on HFNC 40L 60%. SpO2 now 96%. Will continue to follow.     RANDAL Perez, RRT

## 2024-04-03 NOTE — PROGRESS NOTES
Renal Medicine Progress Note                                Shirley Hendricks MRN# [id number]   Age: 63 year old YOB: 1960   Date of Admission: 3/29/2024 Hospital LOS: 5                  Assessment/Plan:       1.  Suspect modest CKD at baseline              -slow progression over time              -DN/vascular disease  2.  Last available UA 2019              -no quantification studies  3.  Acute Kidney Injury              -oliguric              -IV contrast                          -3 separate consecutive exposures               -elevated CK     Likely BAILEY +/- rhabdo +/- ischemic injury  Presuming no interruption of RBF     4.  Acidosis              -NAGMA              -small respiratory component   5.  Ischemic RLE due to occluded bypass graft   -AKA due to unsalvagable limb 04/01/24  6.  Hypocalcemia  7.  Anemia   -transfuse         HCO3- improving  Creatinine continue to rise  Limited UO  No appreciable diuretic response      TDC placement this am  IHD to follow    Anticipate daily runs for volume removal       Interval History:     Awake   Remains somewhat confused    Limited UO   10 liters up    Increasing respiratory distress/O2    Family at bedside  Reviewed with vascular surgery       ROS:     GENERAL: NAD, No fever,chills  CV: NEGATIVE for chest pain, palpitations  GI: NEGATIVE for abdominal pain, heartburn, nausea, vomiting or change in bowel pattern  ROS otherwise negative    Medications and Allergies:     Reviewed    Physical Exam:     Vitals were reviewed  Patient Vitals for the past 12 hrs:   BP Temp Temp src Pulse SpO2   04/03/24 0937 -- -- -- -- 95 %   04/03/24 0800 (!) 181/79 97.5  F (36.4  C) Bladder 101 93 %   04/03/24 0742 -- 98.4  F (36.9  C) -- 89 98 %   04/03/24 0739 -- -- -- -- 100 %   04/03/24 0700 (!) 177/97 98.4  F (36.9  C) -- 105 97 %   04/03/24 0609 (!) 192/125 98.2  F (36.8  C) -- 108 91 %   04/03/24 0600 (!) 183/128 98.2  F (36.8  C) -- 101 95 %   04/03/24 0500  110/59 98.4  F (36.9  C) -- 80 97 %   04/03/24 0400 (!) 155/83 98.4  F (36.9  C) Bladder 96 93 %   04/03/24 0300 (!) 176/139 98.2  F (36.8  C) -- 101 93 %   04/03/24 0240 -- -- -- -- 94 %   04/03/24 0200 (!) 155/77 98.2  F (36.8  C) -- 89 93 %   04/03/24 0100 138/64 98.4  F (36.9  C) -- 83 96 %   04/03/24 0000 (!) 171/145 98.8  F (37.1  C) Bladder 99 91 %   04/02/24 2315 (!) 147/75 -- -- -- --   04/02/24 2257 -- -- -- -- 93 %   04/02/24 2200 (!) 140/74 99.1  F (37.3  C) -- 98 92 %     Wt Readings from Last 4 Encounters:   04/01/24 55.2 kg (121 lb 12.8 oz)   01/08/24 49.8 kg (109 lb 12.8 oz)   12/12/23 50.8 kg (112 lb)   11/21/23 50.6 kg (111 lb 8 oz)     I/O last 3 completed shifts:  In: 687 [P.O.:50; I.V.:137]  Out: 78 [Urine:78]    Vitals:    03/29/24 1431 03/29/24 1830 03/30/24 0600 03/31/24 0600   Weight: 49.9 kg (110 lb) 48 kg (105 lb 14.4 oz) 47.9 kg (105 lb 9.6 oz) 52.2 kg (115 lb 1.6 oz)    04/01/24 0600   Weight: 55.2 kg (121 lb 12.8 oz)       GENERAL: awake, alert, follows  HEENT: NC/AT, PERRLA, EOMI, non icteric, pharynx moist without lesion  RESP:  clear anteriorly  CV: RRR, normal S1 S2  ABDOMEN: soft, nontender, no HSM or masses and bowel sounds normal  MS: no clubbing, cyanosis   SKIN: clear without significant rashes or lesions  EXT: BKA     Data:     Recent Labs   Lab 04/03/24  0754 04/03/24  0431 04/03/24  0423 04/03/24  0116 04/02/24  1850 04/02/24  1823 04/02/24  1432 04/02/24  1311   NA  --   --  138 139  --  146*  --  139   POTASSIUM  --   --  4.9 4.9  --  4.7  --  4.8   CHLORIDE  --   --  99 101  --  102  --  102   CO2  --   --  22 22  --  30*  --  20*   ANIONGAP  --   --  17* 16*  --  14  --  17*   GLC 99 91 92 96   < > 108*   < > 116*   BUN  --   --  89.3* 87.8*  --  84.8*  --  85.0*   CR  --   --  4.02* 3.92*  --  3.64*  --  3.60*   GFRESTIMATED  --   --  12* 12*  --  13*  --  13*   SONIA  --   --  6.2* 5.9*  --  5.8*  --  6.0*    < > = values in this interval not displayed.         Recent  Labs   Lab 04/03/24  0754 04/03/24  0431 04/03/24  0423   NA  --   --  138   POTASSIUM  --   --  4.9   CHLORIDE  --   --  99   CO2  --   --  22   ANIONGAP  --   --  17*   GLC 99   < > 92   BUN  --   --  89.3*   CR  --   --  4.02*   GFRESTIMATED  --   --  12*   SONIA  --   --  6.2*    < > = values in this interval not displayed.     Recent Labs   Lab 04/03/24 0423 04/03/24  0116 04/02/24  1823 04/02/24  1311 04/02/24  0404 04/02/24  0049 04/01/24  1801 04/01/24  1419 04/01/24  0539 03/31/24  2338   CR 4.02* 3.92* 3.64* 3.60* 3.43* 3.28* 2.98* 2.83* 2.58* 2.39*     Recent Labs   Lab 04/03/24 0423 04/03/24  0116 04/02/24  1823 04/02/24  1311 04/02/24  0404   POTASSIUM 4.9 4.9 4.7 4.8 4.8     No lab results found in last 7 days.  Recent Labs   Lab 03/31/24  2010   LACT 1.1     Recent Labs   Lab 04/03/24 0423 04/02/24  1201 04/02/24  0404 04/01/24  0539   HGB 8.8* 8.7* 6.7* 7.9*         G Nayan Barboza MD    [facility] Nephrology  [phone]

## 2024-04-03 NOTE — PROGRESS NOTES
S: Pt is a 63 yof who was seen today at [facility], Room , for evaluation, fit and delivery of a RIGHT AK limb protector and post-op supplies.  Pt is currently in the process of a guillotine amputation. Patient will be AKA on friday 04/05.    Visual inspection: Patient has drainage tubing from guillotine amputation.    A: The patient was provided with:  1 AK limb protector - protects limb from mild impact  1 FloTech Belt - suspends the limb protector.  2 AK Shrinkers - Ossur size 2  1  donning tube    P: Patient will have AK amputation on friday. I will follow up with the patient in the hospital on friday afternoon after surgery to make sure the IPOP is donned correctly.    G: Protect residual limb from injury, promote healing of incision site.    Electronically signed by Jacoby Coyle, Board Eligible

## 2024-04-03 NOTE — IR NOTE
Interventional Radiology Intra-procedural Nursing Note    Patient Name: Shirley Hendricks  Medical Record Number: [id number]  Today's Date: April 3, 2024    Procedure: Tunneled dialysis catheter placement with moderate sedation   Start time: 1000  End time: 1015  Report provided to: MARISSA Block  Patient depart time and location: 1030 to ICU Room 364    Note: Patient entered Interventional Radiology Suite number 2 via cart. Patient awake, alert and oriented. Assisted onto procedural table in supine position. Prepped and draped.  Dr. Rosenthal in room. Time out and procedure started. Vital signs stable. Telemetry reading SR.    Procedure well tolerated by patient without complications. Procedure end with debrief by Dr. Rosenthal.  Manual pressure applied until hemostasis achieved. Sutures, steri strips, quick clot, tegaderm dressing applied to right interventional procedure access site, dressing is c/d/I.    Administered medication totals:  Lidocaine 1% 20 mL Intradermal  Heparin 4000 Units IVP hep lock CVC  Versed 1 mg IVP  Fentanyl 25 mcg IVP    Last dose of sedation administered at 1003.

## 2024-04-03 NOTE — PROGRESS NOTES
"VASCULAR SURGERY PROGRESS NOTE    Subjective:  Requiring 10-12L oxygen overnight, placed on HFNC 40L 60%. Confused and agitated this morning with increased work of breathing. Oliguric and noticeable edema in all extremities. Cr increasing to 4.02. Mottling has not worsened of the right lower extremity->underwent right through knee guillotine amputation on 4/1 with minimal blood flow    Objective:  Intake/Output Summary (Last 24 hours) at 4/3/2024 0829  Last data filed at 4/3/2024 0600  Gross per 24 hour   Intake 673.33 ml   Output 68 ml   Net 605.33 ml     PHYSICAL EXAM:  BP (!) 177/97   Pulse 105   Temp 98.4  F (36.9  C)   Resp 15   Ht 1.549 m (5' 1\")   Wt 55.2 kg (121 lb 12.8 oz)   LMP  (LMP Unknown)   SpO2 98%   BMI 23.01 kg/m    Patient is awake, disoriented x4, agitated, confused  Requiring HFNC, increased work of breathing  Abdomen is soft, nontender to light palpation. Edema is noted  Wound vac in place of right through knee stump-minimal output. Ecchymosis unchanged to right lateral thigh where graft was removed. Bilateral groin 2+ femoral pulse-sutures in place, no skin breakdown is noted    Labs:   Na 138  K 4.9  CR 4.02  CK 6469  WBC 9.2  HGB 8.8  Platelets 57    ASSESSMENT:  63-year-old female well-known to the vascular surgery team with hx of:     2/15/2022: Distal popliteal artery occlusion with jump graft to distal popliteal/tibial arteries  5/6/2023: Right distal femoral to popliteal graft revision with cadaveric SFA to mid anterior tibial artery  10/7/2023: Emergent right femoral and anterior tibial thrombectomy.  Right femoral to anterior tibial Propaten 6 mm PTFE bypass (distal graft anastomosed to remaining several centimeters of cadaveric SFA graft ). graft anterior tibial enterectomy and vein patch angioplasty.  Dorsalis pedis thrombectomy   10/11/2023: Emergency right femoral to anterior tibial PTFE bypass graft thrombectomy with anterior tibial thrombectomy  1/4/24: Widely patent " right leg bypass graft on duplex @office visit     Patient presented with acute occlusion of right fem to anterior tibial PTFE/cadaveric SFA graft.  Initial heparin and lytics with angiogram notable for patent bypass but bleeding in groin resolved with Viabahn stent placement and embolization of side branch but with persistent decreased outflow and inability to tolerate further lytics due to continued hypofibrinogenemia, hematochezia, and worsening clinical status of right foot.     Now s/p right through knee guillotine amp on 4/1      PLAN:  -IR for tunneled cath placement  -nephrology following, with worsening respiratory and fluid status, need for dialysis after IR  -NPO for both procedure and concern for aspiration, will need swallow evaluation once mentation improves  -Consumptive coagulopathy noted, no indication for platelet transfusion at this time but repeat daily CBC  -No heparin, follow-up HIT panel  -IV fluid per nephrology   -plavix to restart once platelets improve, continue to monitor bypass in LLE  -appreciate medical teams involved in co management of Nel's care    Discussed pt history, exam, assessment and plan with Dr. Lozano of the vascular surgery service, who is in agreement with the above.    Kaylee Liu, NP  VASCULAR SURGERY

## 2024-04-03 NOTE — PROGRESS NOTES
Potassium   Date Value Ref Range Status   04/03/2024 5.0 3.4 - 5.3 mmol/L Final   12/29/2022 4.3 3.4 - 5.3 mmol/L Final   07/09/2021 5.2 3.4 - 5.3 mmol/L Final     Hemoglobin   Date Value Ref Range Status   04/03/2024 8.8 (L) 11.7 - 15.7 g/dL Final   07/09/2021 8.8 (L) 11.7 - 15.7 g/dL Final     Creatinine   Date Value Ref Range Status   04/03/2024 4.22 (H) 0.51 - 0.95 mg/dL Final   07/09/2021 0.77 0.52 - 1.04 mg/dL Final     Urea Nitrogen   Date Value Ref Range Status   04/03/2024 93.4 (H) 8.0 - 23.0 mg/dL Final   12/29/2022 17 7 - 30 mg/dL Final   07/09/2021 13 7 - 30 mg/dL Final     Sodium   Date Value Ref Range Status   04/03/2024 140 135 - 145 mmol/L Final     Comment:     Reference intervals for this test were updated on 09/26/2023 to more accurately reflect our healthy population. There may be differences in the flagging of prior results with similar values performed with this method. Interpretation of those prior results can be made in the context of the updated reference intervals.    07/09/2021 132 (L) 133 - 144 mmol/L Final     INR   Date Value Ref Range Status   10/07/2023 1.38 (H) 0.85 - 1.15 Final   09/24/2020 1.01 0.86 - 1.14 Final       DIALYSIS PROCEDURE NOTE  Hepatitis status of previous patient on machine log was checked and verified ok to use with this patients hepatitis status.  Patient dialyzed for 3 hrs per Dr Barboza. on a K2 bath with a net fluid removal of  2L. Did not meet goal. Dr. Barboza aware.  A BFR of 225 ml/min was obtained via a right CVC.       The treatment plan was discussed with Dr. Barboza during the treatment.    Total heparin received during the treatment: 0 units.   Line flushed, clamped and capped with heparin 1:1000 1.6 mL (1600 units) per lumen    Meds  given: 25% Albumin 50 mL.   Complications: hypotension goal reduce NS bolus and Albumin.      Person educated: pt/family. Knowledge base minimal. Barriers to learning: new to dialysis and pt delirium. Educated on  procedure via verbal mode. patient/family needs reinforcement.  ICEBOAT? Timeout performed pre-treatment  I: Patient was identified using 2 identifiers  C:  Consent Signed Yes  E: Equipment preventative maintenance is current and dialysis delivery system OK to use  B: Hepatitis B Surface Antigen: negative; Draw Date: 4/3/2024      Hepatitis B Surface Antibody: unknown; Draw Date: 4/3/2024  O: Dialysis orders present and complete prior to treatment  A: Vascular access verified and assessed prior to treatment  T: Treatment was performed at a clinically appropriate time  ?: Patient was allowed to ask questions and address concerns prior to treatment  See Adult Hemodialysis flowsheet in [facility] for further details and post assessment.  Machine water alarm in place and functioning. Transducer pods intact and checked every 15min.   Pt assisted with repositioning throughout dialysis treatment.  Pt returned via NA bedside tx.  Chlorine/Chloramine water system checked every 4 hours.      Post treatment report given to Mckayla Schneider, MARISSA regarding 2L of fluid removed, last BP      Neena Aparicio, MARISSA

## 2024-04-03 NOTE — IR NOTE
[facility]  POST PROCEDURE NOTE      Procedure: Right Int Jug tunneled 19 cm HD catheter with tip in the RA.  Heparinized and ready for use.  No complications.    Physician: Denisse      Estimated Blood Loss: 10 ml        Plan: Catheter ready for use.      Jordan Rosenthal MD   4/3/2024, 10:25 AM

## 2024-04-03 NOTE — PROGRESS NOTES
Renal Medicine           Second visit today  Initial dialysis run 04/03/24    Dialysis run parameters reviewed with dialysis RN at patient bedside.  Patient seen on run    Initial orders:  2.5 hours   ml/hr  UF to 3 liters    UF limited by low BP  Decreased BP following dilaudid for pain  25% albumin ordered    BP improved post 25%  UF goal increased  Runtime increased to 3 hour    Anticipate final UF in 1.5 to 1.8 liter range    Repeat procedure in am  5% albumin prime         Recent Labs   Lab 04/03/24  1141      POTASSIUM 5.0   CHLORIDE 100   CO2 21*   ANIONGAP 19*   GLC 80   BUN 93.4*   CR 4.22*   GFRESTIMATED 11*   SONIA 6.4*           LUIS M Barboza    [facility]  [phone]

## 2024-04-03 NOTE — PROGRESS NOTES
Pt transitioned from high flow nasal cannula to 7 LPM oxymask. Tolerating well. Scheduled albuterol nebs given. RT to follow.

## 2024-04-03 NOTE — PROGRESS NOTES
VASCULAR SURGERY    Back from IR with TC placement.  Will initiate hemodialysis with Dr Barboza today.    Oliguric LIMA with fluid overload-- minimal UO after Bumex    Fluid + 10,000 ml    Hgb=8.8  (no bleeding and mostly dilutional)    Agree that confusion due to delirium and uremia.       Chadwick Lozano MD

## 2024-04-03 NOTE — PROGRESS NOTES
[facility]    Medicine Progress Note - Hospitalist Service    Date of Admission:  3/29/2024  Date of Service: 04/03/2024    Assessment & Plan     Shirley Hendricks is a 63 year old female admitted on 3/29/2024 due to occlusion of right LE bypass graft.      Past medical history significant for PAD/PVD, Tobacco use D/O, CAD (history of MI x3), HTN, HLP, DM2, Neuropathy, COPD, GERD, Anemia, Spongiotic dermatitis, MDD with anxiety, Chronic anticoagulation therapy with history of DVT/PE, OA, Charcot-Breonna-Tooth foot deformity, Marginal zone B-cell lymphoma, History of SBO, History of Takotsubo CM, History of SVT.    Discussed with Dr. Mazariegos and reviewed ED notes and Vascular Surgery consult note.  Patient presented to the ED due to right leg pain that had begun ~ 1 hour prior to presentation.  She reported pain seems to worsen with walking and when she attempted to check a pulse in her leg it was absent.  She also described that the foot is colder than the left.      While in the lobby attempts to find right pedal pulse with doppler were unsuccessful.  Dr. Lozano of Vascular Surgery was contacted by the patient as well as Dr. Mazariegos.      Work-up completed while patient was in the lobby included right arterial LE US revealed the right common femoral artery to mid anterior tibial artery bypass graft occlusion.      After assessing patient labs were collected and included a CMP that revealed a sodium of 132, chloride of 97, CO2 of 21 and glucose of 103 otherwise within normal limits.  CBC with differential revealed an RDW of 15.7 otherwise unremarkable.    Right common femoral artery to mid anterior tibial artery bypass graft acute occlusion  PAD/PVD  Acute Blood Loss Anemia  *Occurred despite low-dose Xarelto and Plavix therapy.    - Vascular surgery consult requested -> urgent guillotine right above knee amputation today followed by close monitoring in the ICU.    - IR consulted and  following  --Per Vascular surgery consult Dr. Salomon was contacted and s/p right LE angiogram and lytic therapy 03/30 -> 03/31 took the patient to the angio suite to remove the sheath in the left groin with closure device.   - Hold PTA Xarelto 15 mg nightly.    - Defer resumption of PTA Plavix 75 mg/d to Vascular Surgery and/or IR.    - Statin and antihypertensive management as below.    - Pain management as needed  - Follow Hgb   - Cannot perform CTA of abdomen at this time and since hemodynamically stable ->  angiogram will see if there is any active extravasation.   - transfused 1 U PRBCs 03/30 / 03/31 and 04/02 and closely monitor.    Rhabdomyolysis   ARF  Assessment: CPK has been rising. Her serum creatinine and body urea nitrogen also rising. Renal US -> No obstruction demonstrated.   Plan:  - Follow CK / BMP daily  - Nephrology following -> now on HD, Decreased BP following dilaudid for pain and 25% albumin ordered today with improvement in BPs  - Alvarez catheter in place  - Avoid NSAIDs / nephrotoxins    MDD with anxiety  Acute Delirium   *Noted on chart review.  No interventions.    Delirium from acute illness  IM Zyprexa as needed    Contrast dye allergy  - Ordered solumedrol and benadryl per contrast dye allergy protocol.      Mild hyponatremia  - BMP daily    Tobacco Use D/O   Patient currently smokes max 5 cigarettes per day.     - Counseled regarding smoking cessation that should also continue with PCP.    - Nicoderm patch ordered.    Recent celiac disease  *Patient reported recent GI work-up revealed she has celiac disease.    - Once diet can be advanced would request no gluten/celiac diet.      CAD (history of MI x3)  HTN  HLP  *Last coronary angiogram completed 10/2023.    - Resumed on PTA Coreg 6.25 mg BID, hold lisinopril 10 mg/d due to LIMA, continue Norvasc 2.5 mg/d.  Hold parameters in place.    - hold PTA rosuvastatin 40 mg/d due to acute rhabdo  - PRN IV hydralazine 10 mg every 4 hours for  SBP GREATER THAN 180.    - Monitor on telemetry.      History of Takotsubo CM  *Recovered EF (55-60%) from ECHO 11/2023.    - Resumed on PTA Coreg 6.25 mg BID (hold for SBP < 110), hold lisinopril 10 mg/d and  hold Jardiance 10 mg/d. For now given ARF    Type 2 DM  Diabetic neuropathy  *Noted diagnosis on chart review.  However, A1c of 5.2%.    - Hold PTA Jardiance 10 mg/d and gabapentin 100 mg TID due to AMS.    COPD   - Resumed on PTA inhalers.    - Encourage use of Flutter valve/IS.      GERD  - Resumed on PTA omeprazole 20 mg/d.      Anemia  Recent GI bleed (12/2023 and admitted at [facility])  - Hold PTA Iron supplements and resume at discharge.    - CBC daily      Spongiotic dermatitis  *Recently seen at outpatient Derm.    - Resumed on PTA betamethasone cream BID.      Chronic anticoagulation therapy with history of DVT/PE  - Hold PTA Xarelto.      OA  - Pain management as above.      Charcot-Breonna-Tooth foot deformity  *Noted on chart review.  No interventions.     Stage VALENTIN marginal zone B-cell lymphoma  *Follows with [facility] (Dr. Barrow).    - Continue outpatient surveillance (CT scan in 8/2024).      History of SBO  *Noted on chart review.  No interventions.    History of SVT  - Monitor on telemetry.    - Resumed on PTA Coreg as above.            Diet: NPO per Anesthesia Guidelines for Procedure/Surgery Except for: Meds    DVT Prophylaxis: Pneumatic Compression Devices  Alvarez Catheter: PRESENT, indication: Acute retention or obstruction  Lines: PRESENT      PICC 03/31/24 Double Lumen Right Brachial vein medial access-Site Assessment: WDL  CVC Double Lumen Right Internal jugular Tunneled-Site Assessment: WDL      Cardiac Monitoring: ACTIVE order. Indication: ICU  Code Status: Full Code      Clinically Significant Risk Factors         # Hypernatremia: Highest Na = 146 mmol/L in last 2 days, will monitor as appropriate  # Hypocalcemia: Lowest iCa = 3.5 mg/dL in last 2 days, will monitor and replace as  appropriate    # Anion Gap Metabolic Acidosis: Highest Anion Gap = 19 mmol/L in last 2 days, will monitor and treat as appropriate    # Thrombocytopenia: Lowest platelets = 57 in last 2 days, will monitor for bleeding  # Acute Kidney Injury, unspecified: based on a >150% or 0.3 mg/dL increase in last creatinine compared to past 90 day average, will monitor renal function  # Hypertension: Noted on problem list            # Financial/Environmental Concerns:           Disposition Plan     Expected Discharge Date: 04/05/2024,  3:00 PM                Marcin White MD  Hospitalist Service  [facility]  Securely message with [employer] (more info)  Text page via [facility] Paging/Directory   ______________________________________________________________________    Interval History     No acute events overnight  Overnight encephalopathic and with hallucinations needing mitts and restraints.   Pain mostly controlled with IV Dilaudid PRN  Dialysis run today limited by hypotension  No fevers  No new CP/SOB   Having quite severe abdominal pain, generalized  No new complaints otherwise  Family at bedside    Physical Exam   Vital Signs: Temp: 98.6  F (37  C) Temp src: Bladder BP: 104/88 Pulse: 100   Resp: 11 SpO2: 96 % O2 Device: Oxymask Oxygen Delivery: 7 LPM  Weight: 121 lbs 12.8 oz      Constitutional: Awake, alert, no apparent distress.    Pulmonary: CTABL  Cardiovascular: Regular rate and rhythm, normal S1 and S2, no S3 or S4, and no murmur noted.  GI: Normal bowel sounds, soft, non-distended, non-tender.    Neuro: Fahad but has sensory loss and is not able to wiggle toes but is able to plantarflex and dorsiflex.   Psych:  Alert and oriented to person only. Confused / encephalopathic. Normal affect.  Extremities: Wound VAC intact right open AKA   ----------------------------------------------------------------------------------------    Medical Decision Making       60 MINUTES SPENT BY ME on the date of service  doing chart review, history, exam, documentation & further activities per the note.      Data   ------------------------- PAST 24 HR DATA REVIEWED -----------------------------------------------    I have personally reviewed the following data over the past 24 hrs:    9.2  \   8.8 (L)   / 57 (L)     140 100 93.4 (H) /  80   5.0 21 (L) 4.22 (H) \       Imaging results reviewed over the past 24 hrs:   Recent Results (from the past 24 hour(s))   US Upper Ext Arterial Duplex Bilateral    Narrative    EXAM: US UPPER EXTREMITY ARTERIAL DUPLEX BILATERAL  LOCATION: [facility]  DATE: 4/3/2024    INDICATION: Baseline exam given bilaterally nonpalp radial pulses  COMPARISON: None.  TECHNIQUE: Arterial Duplex ultrasound of the bilateral arms. Color flow and spectral Doppler with waveform analysis performed.    FINDINGS (RIGHT upper extremity):  ARTERIAL PEAK SYSTOLIC VELOCITIES (cm/s):  Subclavian artery: 148  Axillary artery: 105  Brachial (proximal): 141  Brachial (distal): 128  Radial: 34  Ulnar: 68    WAVEFORMS/COLOR DOPPLER: Monophasic in the radial artery. Biphasic in the remainder of the arm.    FINDINGS (LEFT upper extremity):  ARTERIAL PEAK SYSTOLIC VELOCITIES (cm/s):  Subclavian artery: 191  Axillary artery: 64  Brachial (proximal): 130  Brachial (distal): 98  Radial: 107  Ulnar: 97     WAVEFORMS/COLOR DOPPLER: Biphasic and triphasic waveforms throughout the arm.      Impression    IMPRESSION:   1.  Right radial artery decreased flow velocity and monophasic waveforms. All vessels are patent.  2.  Normal left arm Doppler.     ------------------------- ENCOUNTER LABS ----------------------------------------------------------------  Recent Labs   Lab 04/03/24  1141 04/03/24  1126 04/03/24  0754 04/03/24  0431 04/03/24  0423 04/03/24  0116 04/02/24  1205 04/02/24  1201 04/02/24  0412 04/02/24  0404   WBC  --   --   --   --  9.2  --   --  7.5  --  6.5   HGB  --   --   --   --  8.8*  --   --  8.7*   --  6.7*   MCV  --   --   --   --  83  --   --  82  --  83   PLT  --   --   --   --  57*  --   --  63*  --  59*     --   --   --  138 139   < >  --   --  138   POTASSIUM 5.0  --   --   --  4.9 4.9   < >  --   --  4.8   CHLORIDE 100  --   --   --  99 101   < >  --   --  102   CO2 21*  --   --   --  22 22   < >  --   --  19*   BUN 93.4*  --   --   --  89.3* 87.8*   < >  --   --  81.2*   CR 4.22*  --   --   --  4.02* 3.92*   < >  --   --  3.43*   ANIONGAP 19*  --   --   --  17* 16*   < >  --   --  17*   SONIA 6.4*  --   --   --  6.2* 5.9*   < >  --   --  5.8*   GLC 80 90 99   < > 92 96   < >  --    < > 68*    < > = values in this interval not displayed.       Most Recent 3 CBC's:  Recent Labs   Lab Test 04/03/24  0423 04/02/24  1201 04/02/24  0404   WBC 9.2 7.5 6.5   HGB 8.8* 8.7* 6.7*   MCV 83 82 83   PLT 57* 63* 59*     Most Recent 3 BMP's:  Recent Labs   Lab Test 04/03/24  1141 04/03/24  1126 04/03/24  0754 04/03/24  0431 04/03/24  0423 04/03/24  0116     --   --   --  138 139   POTASSIUM 5.0  --   --   --  4.9 4.9   CHLORIDE 100  --   --   --  99 101   CO2 21*  --   --   --  22 22   BUN 93.4*  --   --   --  89.3* 87.8*   CR 4.22*  --   --   --  4.02* 3.92*   ANIONGAP 19*  --   --   --  17* 16*   SONIA 6.4*  --   --   --  6.2* 5.9*   GLC 80 90 99   < > 92 96    < > = values in this interval not displayed.     Most Recent 2 LFT's:  Recent Labs   Lab Test 10/07/23  1950 10/03/23  0737   AST 18 25   ALT 15 22   ALKPHOS 35 82   BILITOTAL 0.2 0.9     Most Recent 3 INR's:  Recent Labs   Lab Test 10/07/23  0516 10/06/23  0551 10/05/23  1014   INR 1.38* 1.08 0.95     Most Recent 3 Troponin's:  Recent Labs   Lab Test 06/10/20  1243 02/16/20  1824 09/18/19  0739   TROPI <0.015 <0.015 <0.015     Most Recent 3 BNP's:No lab results found.  Most Recent D-dimer:  Recent Labs   Lab Test 08/13/21  0934   DD 10.38*     Most Recent Cholesterol Panel:  Recent Labs   Lab Test 10/03/23  0941   CHOL 137   LDL 69   HDL 54   TRIG 68      Most Recent 6 Bacteria Isolates From Any Culture (See EPIC Reports for Culture Details):No lab results found.  Most Recent TSH and T4:  Recent Labs   Lab Test 01/05/21  1119 02/04/19  0934 03/28/17  0922   TSH 0.77   < > 1.37   T4  --   --  1.19    < > = values in this interval not displayed.     Most Recent Urinalysis:  Recent Labs   Lab Test 02/04/19  0935   COLOR Yellow   APPEARANCE Clear   URINEGLC Negative   URINEBILI Negative   URINEKETONE Negative   SG 1.010   UBLD Negative   URINEPH 6.0   PROTEIN Negative   UROBILINOGEN 0.2   NITRITE Negative   LEUKEST Negative   RBCU O - 2   WBCU 0 - 5     Most Recent ESR & CRP:  Recent Labs   Lab Test 11/13/23  1705   SED 39*

## 2024-04-03 NOTE — PROGRESS NOTES
Vascular surgery note:    Patient seen and examined multiple times throughout the day with family present at bedside including daughter Miriam.  Patient continued to have significant delirium and confusion with suspected metabolic encephalopathy due to significant derangement from renal failure and uremia.  Tunneled catheter placed this morning by IR and dialysis initiated around 1 PM this afternoon.  Initially hypotensive but improved with colloid bolus, nephrology planning to repeat HD in AM.    -Continue every 6 hour BMP  -Continue Alvarez to monitor urine output  -Minimize pain control to minimize delirium and sedation  -Continue wound VAC to right lower extremity, further OR plans for completion amputation will be determined based on clinical course  -Please notify vascular surgery stat for any change in exam, continue ICU care    D/W Dr Hernandez and Dr Blake Morales,   Vascular Surgery Fellow

## 2024-04-04 ENCOUNTER — APPOINTMENT (OUTPATIENT)
Dept: OCCUPATIONAL THERAPY | Facility: CLINIC | Age: 64
DRG: 270 | End: 2024-04-04
Payer: COMMERCIAL

## 2024-04-04 ENCOUNTER — APPOINTMENT (OUTPATIENT)
Dept: PHYSICAL THERAPY | Facility: CLINIC | Age: 64
DRG: 270 | End: 2024-04-04
Payer: COMMERCIAL

## 2024-04-04 LAB
ALBUMIN SERPL BCG-MCNC: 2.1 G/DL (ref 3.5–5.2)
ALP SERPL-CCNC: 192 U/L (ref 40–150)
ALT SERPL W P-5'-P-CCNC: 326 U/L (ref 0–50)
ANION GAP SERPL CALCULATED.3IONS-SCNC: 12 MMOL/L (ref 7–15)
ANION GAP SERPL CALCULATED.3IONS-SCNC: 13 MMOL/L (ref 7–15)
AST SERPL W P-5'-P-CCNC: 301 U/L (ref 0–45)
BASE EXCESS BLDV CALC-SCNC: 0.9 MMOL/L (ref -3–3)
BASE EXCESS BLDV CALC-SCNC: 1.3 MMOL/L (ref -3–3)
BASE EXCESS BLDV CALC-SCNC: 1.5 MMOL/L (ref -3–3)
BASE EXCESS BLDV CALC-SCNC: 1.6 MMOL/L (ref -3–3)
BILIRUB DIRECT SERPL-MCNC: 0.36 MG/DL (ref 0–0.3)
BILIRUB SERPL-MCNC: 0.6 MG/DL
BUN SERPL-MCNC: 24.8 MG/DL (ref 8–23)
BUN SERPL-MCNC: 35.1 MG/DL (ref 8–23)
BUN SERPL-MCNC: 52.4 MG/DL (ref 8–23)
BUN SERPL-MCNC: 55.3 MG/DL (ref 8–23)
CALCIUM SERPL-MCNC: 6.9 MG/DL (ref 8.8–10.2)
CALCIUM SERPL-MCNC: 6.9 MG/DL (ref 8.8–10.2)
CALCIUM SERPL-MCNC: 7.3 MG/DL (ref 8.8–10.2)
CALCIUM SERPL-MCNC: 7.6 MG/DL (ref 8.8–10.2)
CHLORIDE SERPL-SCNC: 100 MMOL/L (ref 98–107)
CHLORIDE SERPL-SCNC: 101 MMOL/L (ref 98–107)
CHLORIDE SERPL-SCNC: 98 MMOL/L (ref 98–107)
CHLORIDE SERPL-SCNC: 98 MMOL/L (ref 98–107)
CK SERPL-CCNC: 3045 U/L (ref 26–192)
CK SERPL-CCNC: 3252 U/L (ref 26–192)
CK SERPL-CCNC: 3906 U/L (ref 26–192)
CK SERPL-CCNC: 4204 U/L (ref 26–192)
CREAT SERPL-MCNC: 1.67 MG/DL (ref 0.51–0.95)
CREAT SERPL-MCNC: 2.33 MG/DL (ref 0.51–0.95)
CREAT SERPL-MCNC: 2.93 MG/DL (ref 0.51–0.95)
CREAT SERPL-MCNC: 3.13 MG/DL (ref 0.51–0.95)
DEPRECATED HCO3 PLAS-SCNC: 22 MMOL/L (ref 22–29)
DEPRECATED HCO3 PLAS-SCNC: 23 MMOL/L (ref 22–29)
DEPRECATED HCO3 PLAS-SCNC: 25 MMOL/L (ref 22–29)
DEPRECATED HCO3 PLAS-SCNC: 25 MMOL/L (ref 22–29)
EGFRCR SERPLBLD CKD-EPI 2021: 16 ML/MIN/1.73M2
EGFRCR SERPLBLD CKD-EPI 2021: 17 ML/MIN/1.73M2
EGFRCR SERPLBLD CKD-EPI 2021: 23 ML/MIN/1.73M2
EGFRCR SERPLBLD CKD-EPI 2021: 34 ML/MIN/1.73M2
ERYTHROCYTE [DISTWIDTH] IN BLOOD BY AUTOMATED COUNT: 15.2 % (ref 10–15)
GLUCOSE BLDC GLUCOMTR-MCNC: 103 MG/DL (ref 70–99)
GLUCOSE BLDC GLUCOMTR-MCNC: 108 MG/DL (ref 70–99)
GLUCOSE BLDC GLUCOMTR-MCNC: 84 MG/DL (ref 70–99)
GLUCOSE BLDC GLUCOMTR-MCNC: 87 MG/DL (ref 70–99)
GLUCOSE SERPL-MCNC: 103 MG/DL (ref 70–99)
GLUCOSE SERPL-MCNC: 114 MG/DL (ref 70–99)
GLUCOSE SERPL-MCNC: 121 MG/DL (ref 70–99)
GLUCOSE SERPL-MCNC: 83 MG/DL (ref 70–99)
HCO3 BLDV-SCNC: 27 MMOL/L (ref 21–28)
HCO3 BLDV-SCNC: 27 MMOL/L (ref 21–28)
HCO3 BLDV-SCNC: 28 MMOL/L (ref 21–28)
HCO3 BLDV-SCNC: 28 MMOL/L (ref 21–28)
HCT VFR BLD AUTO: 22.8 % (ref 35–47)
HGB BLD-MCNC: 7.8 G/DL (ref 11.7–15.7)
LACTATE SERPL-SCNC: 0.8 MMOL/L (ref 0.7–2)
MCH RBC QN AUTO: 28.1 PG (ref 26.5–33)
MCHC RBC AUTO-ENTMCNC: 34.2 G/DL (ref 31.5–36.5)
MCV RBC AUTO: 82 FL (ref 78–100)
O2/TOTAL GAS SETTING VFR VENT: 40 %
O2/TOTAL GAS SETTING VFR VENT: 43 %
O2/TOTAL GAS SETTING VFR VENT: 43 %
O2/TOTAL GAS SETTING VFR VENT: 44 %
OXYHGB MFR BLDV: 77 % (ref 70–75)
OXYHGB MFR BLDV: 77 % (ref 70–75)
OXYHGB MFR BLDV: 78 % (ref 70–75)
OXYHGB MFR BLDV: 83 % (ref 70–75)
PCO2 BLDV: 48 MM HG (ref 40–50)
PCO2 BLDV: 49 MM HG (ref 40–50)
PCO2 BLDV: 50 MM HG (ref 40–50)
PCO2 BLDV: 54 MM HG (ref 40–50)
PH BLDV: 7.32 [PH] (ref 7.32–7.43)
PH BLDV: 7.35 [PH] (ref 7.32–7.43)
PH BLDV: 7.36 [PH] (ref 7.32–7.43)
PH BLDV: 7.36 [PH] (ref 7.32–7.43)
PLATELET # BLD AUTO: 54 10E3/UL (ref 150–450)
PO2 BLDV: 45 MM HG (ref 25–47)
PO2 BLDV: 46 MM HG (ref 25–47)
PO2 BLDV: 46 MM HG (ref 25–47)
PO2 BLDV: 53 MM HG (ref 25–47)
POTASSIUM SERPL-SCNC: 3.8 MMOL/L (ref 3.4–5.3)
POTASSIUM SERPL-SCNC: 4 MMOL/L (ref 3.4–5.3)
POTASSIUM SERPL-SCNC: 4.1 MMOL/L (ref 3.4–5.3)
POTASSIUM SERPL-SCNC: 4.1 MMOL/L (ref 3.4–5.3)
PROT SERPL-MCNC: 4.2 G/DL (ref 6.4–8.3)
RBC # BLD AUTO: 2.78 10E6/UL (ref 3.8–5.2)
SAO2 % BLDV: 78.5 % (ref 70–75)
SAO2 % BLDV: 78.5 % (ref 70–75)
SAO2 % BLDV: 79.8 % (ref 70–75)
SAO2 % BLDV: 85.1 % (ref 70–75)
SODIUM SERPL-SCNC: 135 MMOL/L (ref 135–145)
SODIUM SERPL-SCNC: 136 MMOL/L (ref 135–145)
WBC # BLD AUTO: 9.6 10E3/UL (ref 4–11)

## 2024-04-04 PROCEDURE — 999N000157 HC STATISTIC RCP TIME EA 10 MIN

## 2024-04-04 PROCEDURE — 99233 SBSQ HOSP IP/OBS HIGH 50: CPT | Performed by: STUDENT IN AN ORGANIZED HEALTH CARE EDUCATION/TRAINING PROGRAM

## 2024-04-04 PROCEDURE — 250N000009 HC RX 250: Performed by: STUDENT IN AN ORGANIZED HEALTH CARE EDUCATION/TRAINING PROGRAM

## 2024-04-04 PROCEDURE — 85027 COMPLETE CBC AUTOMATED: CPT | Performed by: STUDENT IN AN ORGANIZED HEALTH CARE EDUCATION/TRAINING PROGRAM

## 2024-04-04 PROCEDURE — 0JH63XZ INSERTION OF TUNNELED VASCULAR ACCESS DEVICE INTO CHEST SUBCUTANEOUS TISSUE AND FASCIA, PERCUTANEOUS APPROACH: ICD-10-PCS | Performed by: RADIOLOGY

## 2024-04-04 PROCEDURE — 250N000013 HC RX MED GY IP 250 OP 250 PS 637: Performed by: STUDENT IN AN ORGANIZED HEALTH CARE EDUCATION/TRAINING PROGRAM

## 2024-04-04 PROCEDURE — 97530 THERAPEUTIC ACTIVITIES: CPT | Mod: GP | Performed by: PHYSICAL THERAPIST

## 2024-04-04 PROCEDURE — P9045 ALBUMIN (HUMAN), 5%, 250 ML: HCPCS | Mod: JZ | Performed by: INTERNAL MEDICINE

## 2024-04-04 PROCEDURE — 02H633Z INSERTION OF INFUSION DEVICE INTO RIGHT ATRIUM, PERCUTANEOUS APPROACH: ICD-10-PCS | Performed by: RADIOLOGY

## 2024-04-04 PROCEDURE — 94640 AIRWAY INHALATION TREATMENT: CPT | Mod: 76

## 2024-04-04 PROCEDURE — 82550 ASSAY OF CK (CPK): CPT | Performed by: STUDENT IN AN ORGANIZED HEALTH CARE EDUCATION/TRAINING PROGRAM

## 2024-04-04 PROCEDURE — 250N000013 HC RX MED GY IP 250 OP 250 PS 637: Performed by: PHYSICIAN ASSISTANT

## 2024-04-04 PROCEDURE — 200N000001 HC R&B ICU

## 2024-04-04 PROCEDURE — 82805 BLOOD GASES W/O2 SATURATION: CPT | Performed by: STUDENT IN AN ORGANIZED HEALTH CARE EDUCATION/TRAINING PROGRAM

## 2024-04-04 PROCEDURE — 250N000011 HC RX IP 250 OP 636: Performed by: INTERNAL MEDICINE

## 2024-04-04 PROCEDURE — 258N000003 HC RX IP 258 OP 636: Performed by: INTERNAL MEDICINE

## 2024-04-04 PROCEDURE — 80048 BASIC METABOLIC PNL TOTAL CA: CPT | Performed by: STUDENT IN AN ORGANIZED HEALTH CARE EDUCATION/TRAINING PROGRAM

## 2024-04-04 PROCEDURE — 94640 AIRWAY INHALATION TREATMENT: CPT

## 2024-04-04 PROCEDURE — 250N000011 HC RX IP 250 OP 636: Performed by: STUDENT IN AN ORGANIZED HEALTH CARE EDUCATION/TRAINING PROGRAM

## 2024-04-04 PROCEDURE — 83605 ASSAY OF LACTIC ACID: CPT | Performed by: STUDENT IN AN ORGANIZED HEALTH CARE EDUCATION/TRAINING PROGRAM

## 2024-04-04 PROCEDURE — 258N000001 HC RX 258: Performed by: STUDENT IN AN ORGANIZED HEALTH CARE EDUCATION/TRAINING PROGRAM

## 2024-04-04 PROCEDURE — 97530 THERAPEUTIC ACTIVITIES: CPT | Mod: GO

## 2024-04-04 PROCEDURE — 82248 BILIRUBIN DIRECT: CPT | Performed by: STUDENT IN AN ORGANIZED HEALTH CARE EDUCATION/TRAINING PROGRAM

## 2024-04-04 PROCEDURE — 90935 HEMODIALYSIS ONE EVALUATION: CPT

## 2024-04-04 PROCEDURE — 90935 HEMODIALYSIS ONE EVALUATION: CPT | Performed by: INTERNAL MEDICINE

## 2024-04-04 RX ORDER — DEXTROSE MONOHYDRATE 100 MG/ML
INJECTION, SOLUTION INTRAVENOUS CONTINUOUS PRN
Status: DISCONTINUED | OUTPATIENT
Start: 2024-04-04 | End: 2024-04-22 | Stop reason: HOSPADM

## 2024-04-04 RX ORDER — B COMPLEX C NO.10/FOLIC ACID 900MCG/5ML
5 LIQUID (ML) ORAL DAILY
Status: DISCONTINUED | OUTPATIENT
Start: 2024-04-04 | End: 2024-04-09

## 2024-04-04 RX ORDER — AMOXICILLIN 250 MG
1 CAPSULE ORAL 2 TIMES DAILY
Status: DISCONTINUED | OUTPATIENT
Start: 2024-04-04 | End: 2024-04-08

## 2024-04-04 RX ORDER — BISACODYL 10 MG
10 SUPPOSITORY, RECTAL RECTAL DAILY PRN
Status: DISCONTINUED | OUTPATIENT
Start: 2024-04-04 | End: 2024-04-08

## 2024-04-04 RX ORDER — AMLODIPINE BESYLATE 2.5 MG/1
2.5 TABLET ORAL DAILY
Status: DISCONTINUED | OUTPATIENT
Start: 2024-04-04 | End: 2024-04-09

## 2024-04-04 RX ORDER — BISACODYL 10 MG
10 SUPPOSITORY, RECTAL RECTAL ONCE
Status: COMPLETED | OUTPATIENT
Start: 2024-04-04 | End: 2024-04-04

## 2024-04-04 RX ADMIN — BETAMETHASONE DIPROPIONATE: 0.5 CREAM TOPICAL at 20:32

## 2024-04-04 RX ADMIN — ALBUTEROL SULFATE 2.5 MG: 2.5 SOLUTION RESPIRATORY (INHALATION) at 07:06

## 2024-04-04 RX ADMIN — HEPARIN SODIUM 1600 UNITS: 1000 INJECTION INTRAVENOUS; SUBCUTANEOUS at 10:39

## 2024-04-04 RX ADMIN — HYDROMORPHONE HYDROCHLORIDE 0.2 MG: 0.2 INJECTION, SOLUTION INTRAMUSCULAR; INTRAVENOUS; SUBCUTANEOUS at 19:48

## 2024-04-04 RX ADMIN — SODIUM CHLORIDE 300 ML: 9 INJECTION, SOLUTION INTRAVENOUS at 10:37

## 2024-04-04 RX ADMIN — OXYCODONE HYDROCHLORIDE 5 MG: 5 TABLET ORAL at 11:46

## 2024-04-04 RX ADMIN — OXYCODONE HYDROCHLORIDE 5 MG: 5 TABLET ORAL at 16:55

## 2024-04-04 RX ADMIN — ALBUTEROL SULFATE 2.5 MG: 2.5 SOLUTION RESPIRATORY (INHALATION) at 20:14

## 2024-04-04 RX ADMIN — OXYCODONE HYDROCHLORIDE 5 MG: 5 TABLET ORAL at 21:13

## 2024-04-04 RX ADMIN — SODIUM CHLORIDE, PRESERVATIVE FREE 100 ML: 5 INJECTION INTRAVENOUS at 10:40

## 2024-04-04 RX ADMIN — CARVEDILOL 6.25 MG: 6.25 TABLET, FILM COATED ORAL at 19:50

## 2024-04-04 RX ADMIN — ACETAMINOPHEN 650 MG: 325 TABLET, FILM COATED ORAL at 19:50

## 2024-04-04 RX ADMIN — ALBUTEROL SULFATE 2.5 MG: 2.5 SOLUTION RESPIRATORY (INHALATION) at 15:05

## 2024-04-04 RX ADMIN — SODIUM PHOSPHATE, DIBASIC AND SODIUM PHOSPHATE, MONOBASIC 1 ENEMA: 7; 19 ENEMA RECTAL at 19:44

## 2024-04-04 RX ADMIN — Medication: at 10:42

## 2024-04-04 RX ADMIN — POLYETHYLENE GLYCOL 3350 17 G: 17 POWDER, FOR SOLUTION ORAL at 07:57

## 2024-04-04 RX ADMIN — SENNOSIDES AND DOCUSATE SODIUM 1 TABLET: 8.6; 5 TABLET ORAL at 19:50

## 2024-04-04 RX ADMIN — OXYCODONE HYDROCHLORIDE 2.5 MG: 5 TABLET ORAL at 04:40

## 2024-04-04 RX ADMIN — ACETAMINOPHEN 975 MG: 325 TABLET, FILM COATED ORAL at 06:01

## 2024-04-04 RX ADMIN — SENNOSIDES AND DOCUSATE SODIUM 1 TABLET: 50; 8.6 TABLET ORAL at 07:57

## 2024-04-04 RX ADMIN — HYDROMORPHONE HYDROCHLORIDE 0.2 MG: 0.2 INJECTION, SOLUTION INTRAMUSCULAR; INTRAVENOUS; SUBCUTANEOUS at 10:24

## 2024-04-04 RX ADMIN — FAMOTIDINE 20 MG: 10 INJECTION, SOLUTION INTRAVENOUS at 05:57

## 2024-04-04 RX ADMIN — SENNOSIDES AND DOCUSATE SODIUM 1 TABLET: 50; 8.6 TABLET ORAL at 19:49

## 2024-04-04 RX ADMIN — DOCUSATE SODIUM LIQUID 100 MG: 100 LIQUID ORAL at 20:16

## 2024-04-04 RX ADMIN — NICOTINE 1 PATCH: 14 PATCH, EXTENDED RELEASE TRANSDERMAL at 07:57

## 2024-04-04 RX ADMIN — HYDROMORPHONE HYDROCHLORIDE 0.2 MG: 0.2 INJECTION, SOLUTION INTRAMUSCULAR; INTRAVENOUS; SUBCUTANEOUS at 07:57

## 2024-04-04 RX ADMIN — HYDROMORPHONE HYDROCHLORIDE 0.2 MG: 0.2 INJECTION, SOLUTION INTRAMUSCULAR; INTRAVENOUS; SUBCUTANEOUS at 14:03

## 2024-04-04 RX ADMIN — HYDROMORPHONE HYDROCHLORIDE 0.2 MG: 0.2 INJECTION, SOLUTION INTRAMUSCULAR; INTRAVENOUS; SUBCUTANEOUS at 02:25

## 2024-04-04 RX ADMIN — BISACODYL 10 MG: 10 SUPPOSITORY RECTAL at 13:41

## 2024-04-04 RX ADMIN — AMLODIPINE BESYLATE 2.5 MG: 2.5 TABLET ORAL at 15:14

## 2024-04-04 RX ADMIN — Medication 5 ML: at 17:40

## 2024-04-04 RX ADMIN — VASOPRESSIN: 20 INJECTION, SOLUTION INTRAVENOUS at 17:37

## 2024-04-04 RX ADMIN — ONDANSETRON 4 MG: 2 INJECTION INTRAMUSCULAR; INTRAVENOUS at 11:53

## 2024-04-04 RX ADMIN — ALBUMIN HUMAN 250 ML: 0.05 INJECTION, SOLUTION INTRAVENOUS at 08:32

## 2024-04-04 ASSESSMENT — ACTIVITIES OF DAILY LIVING (ADL)
ADLS_ACUITY_SCORE: 40

## 2024-04-04 NOTE — CONSULTS
CLINICAL NUTRITION SERVICES  -  ASSESSMENT NOTE      Recommendations Ordered by Registered Dietitian (RD): Osmolite 1.5 at 40 mL/hr= 1440 kcal (30  kcal/kg), 60 g protein (1.3 g/kg), 196 g CHO, 0 g fiber, 732 mL H2O  Flushes 60 mL every 4 hours   Nephronex daily    Malnutrition: % Weight Loss:  Weight loss does not meet criteria for malnutrition  % Intake:  </= 50% for >/= 5 days (severe malnutrition)  Subcutaneous Fat Loss:  Orbital region moderate depletion and Upper arm region moderate depletion  Muscle Loss:  Temporal region moderate-severe depletion and Clavicle bone region moderate depletion  Fluid Retention:  Moderate as above     Malnutrition Diagnosis: Severe malnutrition  In Context of:  Acute illness or injury  Chronic illness or disease        REASON FOR ASSESSMENT  Shirley Hendricks is a 63 year old female seen by Registered Dietitian for Provider Order - Registered Dietitian to Assess and Order TF per Medical Nutrition Therapy Protocol      NUTRITION HISTORY  - Information obtained from family at bedside. They note that she was not eating for about 3 days prior to admit and overall appetite has been poor. Family is not sure whether or not she was taking any oral nutritional supplements at home. Her usual weight is about 105#.  - Admitted with the following ~  Arterial occlusion   Sepsis due to right lower extremity nonsalvageable at the and progressive ischemia   Oliguric LIMA   4/1: Right above knee amputation, WOUND VAC placement   4/3: Tunneled dialysis catheter placement --> HD initiated         CURRENT NUTRITION ORDERS  Diet Order:     NPO   Asked to see patient for TF initiation pending GI eval     Current Intake/Tolerance:  N/A    Of note, patient has only had one solid meal since admit  Sister states that patient has only had bites of applesauce, bites of jello, and spoonfuls of chicken broth since admit (day #6-7)      NUTRITION FOCUSED PHYSICAL ASSESSMENT FOR DIAGNOSING MALNUTRITION)  Yes    "           Observed:    Muscle wasting (refer to documentation in Malnutrition section) and Subcutaneous fat loss (refer to documentation in Malnutrition section)    Obtained from Chart/Interdisciplinary Team:  Edema 3+ in extremities     ANTHROPOMETRICS  Height: 5' 1\"  Weight: 48 kg (106#)(3/29)  Body mass index is 22.1 kg/m  - Adjusted for AKA  Weight Status:  Normal BMI  IBW: 43 kg - Adjusted for AKA   % IBW: 112%  Weight History:   Wt Readings from Last 10 Encounters:   04/04/24 52.9 kg (116 lb 10 oz)   01/08/24 49.8 kg (109 lb 12.8 oz)   12/12/23 50.8 kg (112 lb)   11/21/23 50.6 kg (111 lb 8 oz)   11/13/23 51.6 kg (113 lb 11.2 oz)   10/11/23 52.5 kg (115 lb 11.2 oz)   08/02/23 52.3 kg (115 lb 6.4 oz)   05/26/23 52.1 kg (114 lb 12.8 oz)   12/28/22 51.1 kg (112 lb 11.2 oz)   12/27/22 51.7 kg (114 lb)     Weight up significantly from admit d/t edema  Appears that admit wt down 4# or 4% in 3 months     LABS  Labs reviewed    MEDICATIONS  Medications reviewed      ASSESSED NUTRITION NEEDS PER APPROVED PRACTICE GUIDELINES:    Dosing Weight 48 kg   Estimated Energy Needs: 6972-8153 kcals (25-30 Kcal/Kg)  Justification: maintenance  Estimated Protein Needs: 55-75 grams protein (1.2-1.5 g pro/Kg)  Justification: hypercatabolism with acute illness and dialysis  Estimated Fluid Needs: 3917-0400 mL (1 mL/Kcal)  Justification: maintenance    MALNUTRITION:  % Weight Loss:  Weight loss does not meet criteria for malnutrition  % Intake:  </= 50% for >/= 5 days (severe malnutrition)  Subcutaneous Fat Loss:  Orbital region moderate depletion and Upper arm region moderate depletion  Muscle Loss:  Temporal region moderate-severe depletion and Clavicle bone region moderate depletion  Fluid Retention:  Moderate as above     Malnutrition Diagnosis: Severe malnutrition  In Context of:  Acute illness or injury  Chronic illness or disease    NUTRITION DIAGNOSIS:  Inadequate protein-energy intake related to NPO with plans to start TF " (pending GI eval) as evidenced by meeting 0% needs       NUTRITION INTERVENTIONS  Recommendations / Nutrition Prescription  Osmolite 1.5 at 40 mL/hr= 1440 kcal (30  kcal/kg), 60 g protein (1.3 g/kg), 196 g CHO, 0 g fiber, 732 mL H2O  Flushes 60 mL every 4 hours   Nephronex daily     Implementation  Nutrition education: Not appropriate at this time due to patient condition  EN Composition, EN Schedule, Feeding Tube Flush, Multivitamin/Mineral: Orders written to begin Osmolite 1.5 at 10 mL/hr and increase every 12 hours by 15 mL to goal. Flushes and Nephronex as above.   Collaboration and Referral of Nutrition care: Patient discussed today during interdisciplinary bedside rounds.    Nutrition Goals  Goal TF regimen will meet % needs while NPO     MONITORING AND EVALUATION:  Progress towards goals will be monitored and evaluated per protocol and Practice Guidelines    Li Reza, RD, LD, CNSC   Clinical Dietitian - [facility]

## 2024-04-04 NOTE — PROVIDER NOTIFICATION
Hospitalist paged Re: HTN.   Pt has persistent HTN following dialysis, BP meds on hold 2/2 to HD and hypotension yesterday. [id number]. Also given pain meds without improvement. Would you like to add PRN or restart held Coreg dose?     MD response: will resume coreg

## 2024-04-04 NOTE — PROGRESS NOTES
Renal Medicine Inpatient Dialysis Note                                Shirley Hendricks MRN# [id number]   Age: 63 year old YOB: 1960   Date of Admission: 3/29/2024 Hospital LOS: 6          Assessment/Plan:     1.  Suspect modest CKD at baseline              -slow progression over time              -DN/vascular disease  2.  Last available UA 2019              -no quantification studies  3.  Acute Kidney Injury              -oliguric              -IV contrast                          -3 separate consecutive exposures               -elevated CK     Likely BAILEY +/- rhabdo +/- ischemic injury  Presuming no interruption of RBF     4.  Acidosis              -NAGMA              -small respiratory component   5.  Ischemic RLE due to occluded bypass graft   -AKA due to unsalvagable limb 04/01/24  6.  Hypocalcemia  7.  Anemia              -transfuse as indicated    Continue daily dialysis for UF      Interval History:     Initial dialysis yesterday  UF 2 liter  Modest BP decrease    Dialysis run parameters reviewed with dialysis RN at patient bedside.  Seen on run    5% albumin prime  3 liter goal    Stable mid run      ROS     GENERAL: NAD, No fever,chills  R: NEGATIVE for significant cough or SOB  CV: NEGATIVE for chest pain, palpitations  EXT: no change edema  ROS otherwise negative    Dialysis Parameters:     Vitals were reviewed  Patient Vitals for the past 12 hrs:   BP Temp Temp src Pulse Resp SpO2 Weight   04/04/24 0930 (!) 161/103 99.5  F (37.5  C) -- 89 23 97 % --   04/04/24 0915 (!) 145/69 99.5  F (37.5  C) -- 81 14 98 % --   04/04/24 0850 (!) 161/75 99.7  F (37.6  C) -- 87 15 96 % --   04/04/24 0845 134/66 99.7  F (37.6  C) -- 84 16 96 % --   04/04/24 0838 (!) 165/80 99.9  F (37.7  C) Bladder 87 19 95 % --   04/04/24 0830 (!) 165/80 99.9  F (37.7  C) -- 90 (!) 38 96 % --   04/04/24 0815 (!) 156/72 99.9  F (37.7  C) -- 101 22 95 % --   04/04/24 0800 (!) 179/73 99.9  F (37.7  C) -- 87 19 97 % --  "  04/04/24 0706 -- -- -- -- -- 96 % --   04/04/24 0700 129/62 99.9  F (37.7  C) -- 82 13 97 % --   04/04/24 0600 (!) 146/64 100  F (37.8  C) -- 89 20 94 % 52.9 kg (116 lb 10 oz)   04/04/24 0507 (!) 146/66 99.9  F (37.7  C) -- 86 16 94 % --   04/04/24 0500 (!) 151/67 99.9  F (37.7  C) -- 91 13 93 % --   04/04/24 0400 121/60 100  F (37.8  C) Bladder 79 15 96 % --   04/04/24 0300 93/48 99.9  F (37.7  C) -- 82 23 95 % --   04/04/24 0256 -- -- -- -- -- 96 % --   04/04/24 0200 134/68 99.5  F (37.5  C) -- 82 13 96 % --   04/04/24 0100 109/62 99.3  F (37.4  C) -- 80 14 95 % --   04/04/24 0035 136/59 99.1  F (37.3  C) -- 80 12 94 % --   04/04/24 0000 -- 99.3  F (37.4  C) Bladder 81 11 93 % --   04/03/24 2300 -- 99.1  F (37.3  C) -- 90 (!) 8 92 % --   04/03/24 2215 119/53 99.1  F (37.3  C) -- 74 -- -- --   04/03/24 2200 107/53 99.1  F (37.3  C) -- 76 -- 95 % --     Wt Readings from Last 4 Encounters:   04/04/24 52.9 kg (116 lb 10 oz)   01/08/24 49.8 kg (109 lb 12.8 oz)   12/12/23 50.8 kg (112 lb)   11/21/23 50.6 kg (111 lb 8 oz)     I/O last 3 completed shifts:  In: 800 [I.V.:545; NG/GT:255]  Out: 2255 [Urine:205; Drains:50; Other:2000]    Vitals:    03/29/24 1830 03/30/24 0600 03/31/24 0600 04/01/24 0600   Weight: 48 kg (105 lb 14.4 oz) 47.9 kg (105 lb 9.6 oz) 52.2 kg (115 lb 1.6 oz) 55.2 kg (121 lb 12.8 oz)    04/04/24 0600   Weight: 52.9 kg (116 lb 10 oz)       Current Weight: 52.9  Dry Weight: 45 ?  Dialysis Temp: 36.5  C  Access Device: TDC  Access Site: right  Dialyzer: Revaclear  Dialysis Bath: 3  Sodium Profile: n  UF Goal: 3  Blood Flow Rate (mL/min): 250  Total Treatment Time (hrs): 3  Heparin: Low dose as required      EPO dose: n  Zemplar: n  IV Fe: n      Medications and Allergies:     Reviewed      Physical Exam:     Seen and examined during course of dialysis run    BP (!) 161/103   Pulse 89   Temp 99.5  F (37.5  C)   Resp 23   Ht 1.549 m (5' 1\")   Wt 52.9 kg (116 lb 10 oz)   LMP  (LMP Unknown)   SpO2 " 97%   BMI 22.04 kg/m      GENERAL: awake, alert, follows  HEENT: NC/AT, PERRLA, EOMI, non icteric, pharynx moist without lesion  RESP: coarse  CV: RRR, normal S1 S2  MS: BKA  SKIN: catheter site clean without drainage    Data:       Recent Labs   Lab 04/04/24  0805 04/04/24  0541   NA  --  136   POTASSIUM  --  4.1   CHLORIDE  --  98   CO2  --  25   ANIONGAP  --  13   * 103*   BUN  --  55.3*   CR  --  3.13*   GFRESTIMATED  --  16*   SONIA  --  6.9*     Recent Labs   Lab 04/04/24  0541 04/04/24  0040 04/03/24  1731 04/03/24  1141 04/03/24  0423 04/03/24  0116 04/02/24  1823 04/02/24  1311 04/02/24  0404 04/02/24  0049   CR 3.13* 2.93* 2.40* 4.22* 4.02* 3.92* 3.64* 3.60* 3.43* 3.28*     Recent Labs   Lab Test 04/04/24  0541 04/04/24  0040 04/03/24  1731 04/03/24  1141 04/03/24  0423 04/03/24  0116 04/02/24  1823 04/02/24  1311 04/02/24  0404 04/02/24  0049    136 138 140 138 139 146* 139 138 137     No lab results found in last 7 days.  Recent Labs   Lab 04/04/24  0541 04/03/24  0423 04/02/24  1201 04/02/24  0404   HGB 7.8* 8.8* 8.7* 6.7*         G Nayan Barboza MD    [facility] - Nephrology  [phone]

## 2024-04-04 NOTE — PLAN OF CARE
Goal Outcome Evaluation:      Plan of Care Reviewed With: patient    Overall Patient Progress: improvingOverall Patient Progress: improving    Outcome Evaluation: pt. remains lethargic w/ intermittent confusion, intermittently answers questions, less anxious throughout shift, follows commands, generalized weakness throughout. SR, HR 80s-90s, SBP 90s-140s, MAP >65. Pulses intact: LLE: Doppler, Bilat Femoral +4, recheck q2h. HF NC, 40L, 43%, SpO2 >90%. LS: Rhonchi. NPO, NG@ 84cm, Clamped except med admin. Abdomen: soft, active, passing gas. Alvarez in place, minimal UOP. Bruising- R) Thigh, Area marked, No increase. Bruising- BUE. Wound Vac in place, Continuous to 125, 50mL of sanguinous output. Pain: Dilaudid 0.2mg IV given x1, Oxycodone 2.5mg given x1. CK- Trending down.  4/4 HD planned today.      Problem: Adult Inpatient Plan of Care  Goal: Plan of Care Review  Description: The Plan of Care Review/Shift note should be completed every shift.  The Outcome Evaluation is a brief statement about your assessment that the patient is improving, declining, or no change.  This information will be displayed automatically on your shift  note.  Outcome: Progressing  Flowsheets (Taken 4/4/2024 0654)  Outcome Evaluation: pt. remains lethargic w/ intermittent confusion, intermittently answers questions, less anxious throughout shift, follows commands, generalized weakness throughout. SR, HR 80s-90s, SBP 90s-140s, MAP >65. Pulses intact: LLE: Doppler, Bilat Femoral +4, recheck q2h. HF NC, 40L, 43%, SpO2 >90%. LS: Rhonchi. NPO, NG@ 84cm, Clamped except med admin. Abdomen: soft, active, passing gas. Alvarez in place, minimal UOP. Bruising- R) Thigh, Area marked, No increase. Bruising- BUE. Wound Vac in place, Continuous to 125, 50mL of sanguinous output. Pain: Dilaudid 0.2mg IV given x1, Oxycodone 2.5mg given x1. CK- Trending down.  Plan of Care Reviewed With: patient  Overall Patient Progress: improving

## 2024-04-04 NOTE — PROGRESS NOTES
VASCULAR SURGERY     In ICU.  On high flow oxygen.  Lethargic.  Slight increase in urine output following dialysis yesterday.  Improved fluid status but still fluid overloaded.      Palpable left graft and pedal pulse   Hgb= 7.8 (no evidence of bleeding)    Plan VAC change with WOC to right AKA stump that is warm to touch.    Plan dialysis today per nephrology.    Chadwick Lozano MD

## 2024-04-04 NOTE — CONSULTS
[facility]  Gastroenterology Consultation         Shirley Hendricks  [id number]  [facility] [address]  63 year old female    Admission Date/Time: 3/29/2024  Primary Care Provider: Vasquez Benoit  Referring / Attending Physician:  Dr. Morales    We were asked to see the patient in consultation by Dr. Morales for evaluation of Colon distention, sigmoidoscopy to rule out obstruction prior to treating for ogilivies.      CC: R leg pain, concern for ischemic limb.    HPI:  Shirley Hendricks is a 63 year old female with a PMHx  of PAD/PVD, Tobacco use, CAD (history of MI x3), HTN, HLP, DM2, COPD, GERD, Anemia, Chronic anticoagulation therapy with hx DVT/PE, lymphoma, Hx SBO admitted on 3/29/2024 due to occlusion of right LE bypass graft.  Pt presented to the ED due to right leg pain that had begun ~ 1 hour prior to presentation.  Pain seems to worsen with walking, examined pulse was absent.  She also described that the foot is colder than the left.  Attempts to find right pedal pulse with doppler were unsuccessful.  Vascular surg consulted.  Right arterial LE US revealed the right common femoral artery to mid anterior tibial artery bypass graft occlusion.   CMP with sodium of 132, chloride of 97, CO2 of 21 and glucose of 103 otherwise within normal limits. Admitted to ICU.      We were consulted for request of evaluation of colon distention, sigmoidoscopy to rule out obstruction prior to treating for Ogilivie's.  CT abdomen pelvis from 4/30 shows medium sized right retroperitoneal and extraperitoneal hematoma.  Extensive pulmonary opacities.  Third spacing with ascites or anasarca.  Small bilateral pleural effusions.  Fluid-filled distention of the small and large bowel seen in diarrheal state no obstruction.  X-ray of the abdomen shows feeding tube tip, numerous gas distended loops of bowel and colon. No free air.    Labs show hemoglobin 6.7, which improved after transfusion, but  still trending down 8.7--7.8.  Platelet count low at 60-70,000.  Last CK 3252.  Transaminitis noted with , , .  Total bilirubin 0.6 with direct bilirubin 0.36.      ROS: A comprehensive ten point review of systems was negative aside from those in mentioned in the HPI.      PAST MED HX:  I have reviewed this patient's medical history and updated it with pertinent information if needed.   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 clot    Post-menopausal     Reflux esophagitis 02/2004    EGD: esophagitis and medium HH    SBO (small bowel obstruction) (H) 08/10/2021    SVT (supraventricular tachycardia) (H24)     Thrombocytopenia (H24)     Uncomplicated asthma     Vitamin C deficiency 08/12/2018       MEDICATIONS:   Prior to Admission Medications   Prescriptions Last Dose Informant Patient Reported? Taking?   Calcium Carb-Cholecalciferol (CALCIUM CARBONATE-VITAMIN D3) 600-400 MG-UNIT TABS Past Month at Ran out ~1 week ago Self No Yes   Sig: TAKE ONE TABLET BY MOUTH TWICE DAILY   UNKNOWN TO PATIENT 3/29/2024 at am  Yes Yes   Sig: Take 1 tablet by mouth daily Claritin-D (loratadine-pseudoephedrine)   UNKNOWN TO PATIENT 3/28/2024  at pm  Yes Yes   Sig: Take 1 tablet by mouth at bedtime She could not remember name of medication but OTC med to help sleep - may be Zyrtec or Xyzal   acetaminophen (TYLENOL) 500 MG tablet  Self Yes Yes   Sig: Take 500-1,000 mg by mouth every 6 hours as needed for mild pain   amLODIPine (NORVASC) 2.5 MG tablet Past Month at Unintentionally missed ~1 month  No Yes   Sig: Take 1 tablet (2.5 mg) by mouth daily   Patient taking differently: Take 2.5 mg by mouth at bedtime   augmented betamethasone dipropionate (DIPROLENE AF) 0.05 % external cream   No Yes   Sig: Apply topically 2 times daily as needed (skin rash)   augmented betamethasone dipropionate (DIPROLENE AF) 0.05 % external cream   No No   Sig: Apply topically 2 times daily   carvedilol (COREG) 6.25 MG tablet 3/29/2024 at am  No Yes   Sig: Take 1 tablet (6.25 mg) by mouth 2 times daily (with meals)   clopidogrel (PLAVIX) 75 MG tablet 3/29/2024 at am  No Yes   Sig: Take 1 tablet (75 mg) by mouth daily   diphenhydrAMINE (BENADRYL) 25 MG tablet  Self No No   Sig: take Benadryl 25-50 mg one hour prior to the procedure   empagliflozin (JARDIANCE) 10 MG TABS tablet Unsure at Last fill 1/25/24 x90 days but pt unsure if taking  No No   Sig: Take 1 tablet (10 mg) by mouth daily   ferrous sulfate (FEROSUL) 325 (65 Fe) MG tablet Not Taking  No No   Sig: Take 1 tablet (325 mg) by mouth every other day   Patient not taking: Reported on 3/29/2024   fluticasone-vilanterol (BREO ELLIPTA) 200-25 MCG/ACT inhaler 3/29/2024  No Yes   Sig: Inhale 1 puff into the lungs daily   gabapentin (NEURONTIN) 100 MG capsule 3/29/2024 at am  No Yes   Sig: Take 1 capsule (100 mg) by mouth 3 times daily   lisinopril (ZESTRIL) 10 MG tablet 3/29/2024 at am  No Yes   Sig: Take 1 tablet (10 mg) by mouth daily   nicotine (NICODERM CQ) 14 MG/24HR 24 hr patch  at does not have on  No Yes   Sig: Place 1 patch onto the skin daily   Patient taking differently: Place 1 patch onto the skin daily as needed  for nicotine withdrawal symptoms   nitroGLYcerin (NITROSTAT) 0.4 MG sublingual tablet  Self No No   Sig: Place 1 tablet (0.4 mg) under the tongue See Admin Instructions for chest pain   omeprazole (PRILOSEC) 20 MG DR capsule 3/29/2024 at am Self No Yes   Sig: TAKE ONE CAPSULE BY MOUTH DAILY   polyethylene glycol (MIRALAX) 17 GM/Dose powder   No No   Sig: Take 17 g by mouth daily Hold for diarrhea or loose stools   Patient taking differently: Take 17 g by mouth daily as needed for constipation Hold for diarrhea or loose stools   rivaroxaban ANTICOAGULANT (XARELTO) 15 MG TABS tablet 3/28/2024 at pm  No Yes   Sig: Take 1 tablet (15 mg) by mouth daily (with dinner)   rosuvastatin (CRESTOR) 40 MG tablet 3/28/2024 at pm  No Yes   Sig: Take 1 tablet (40 mg) by mouth At Bedtime   senna-docusate (SENOKOT-S/PERICOLACE) 8.6-50 MG tablet   No No   Sig: Take 1 tablet by mouth daily as needed for constipation   senna-docusate (SENOKOT-S/PERICOLACE) 8.6-50 MG tablet 3/28/2024 at pm  Yes Yes   Sig: Take 1 tablet by mouth at bedtime   triamcinolone (KENALOG) 0.1 % external ointment   No Yes   Sig: Apply topically 2 times daily as needed for irritation Apply to back      Facility-Administered Medications: None       ALLERGIES:   Allergies   Allergen Reactions    Pantoprazole      Protonix caused diffuse edema    Chantix [Varenicline]      Terrible dreams    Contrast Dye Swelling     Patient reports facial and throat swelling with prior CT contrast.    Penicillins Itching and Rash       SOCIAL HISTORY:  Social History     Tobacco Use    Smoking status: Some Days     Packs/day: 0.25     Years: 52.00     Additional pack years: 0.00     Total pack years: 13.00     Types: Cigarettes     Start date: 1968    Smokeless tobacco: Never    Tobacco comments:     1/2 PPD   Vaping Use    Vaping Use: Never used   Substance Use Topics    Alcohol use: Not Currently     Comment: x1-2 yr    Drug use: Yes     Types: Marijuana     Comment: 2x per day        FAMILY HISTORY:  Family History   Problem Relation Age of Onset    Cancer Mother         bladder    Cardiovascular Father         alive,multiple heart attacks    Diabetes Maternal Grandmother     Lung Cancer Maternal Grandmother     Blood Disease Brother         clotting disorder       PHYSICAL EXAM:   Vital Signs with Ranges  Temp: 99.5  F (37.5  C) Temp src: Bladder BP: 125/66 Pulse: 84   Resp: 15 SpO2: 94 % O2 Device: High Flow Nasal Cannula (HFNC) Oxygen Delivery: 30 LPM  I/O last 3 completed shifts:  In: 800 [I.V.:545; NG/GT:255]  Out: 2255 [Urine:205; Drains:50; Other:2000]    Constitutional: Awake, alert, no apparent distress.    Pulmonary: CTABL  Cardiovascular: Regular rate and rhythm, normal S1 and S2, and no murmur noted.  GI: Normal bowel sounds, soft, non-distended, non-tender.    Neuro: Fahad equally.   Psych:  Alert and oriented to person only. Confused / encephalopathic. Normal affect.  Extremities: Wound VAC intact right open AKA     ADDITIONAL COMMENTS:   I reviewed the patient's new clinical lab test results.   Recent Labs   Lab Test 04/04/24  0541 04/03/24  0423 04/02/24  1201 10/07/23  1704 10/07/23  0516 10/06/23  1928 10/06/23  0551 10/05/23  1014   WBC 9.6 9.2 7.5   < > 7.7   < > 9.3  --    HGB 7.8* 8.8* 8.7*   < > 10.0*   < > 10.8*  --    MCV 82 83 82   < > 87   < > 87  --    PLT 54* 57* 63*   < > 120*   < > 177  --    INR  --   --   --   --  1.38*  --  1.08 0.95    < > = values in this interval not displayed.     Recent Labs   Lab Test 04/04/24  1249 04/04/24  0541 04/04/24  0040   POTASSIUM 3.8 4.1 4.1   CHLORIDE 100 98 98   CO2 23 25 25   BUN 24.8* 55.3* 52.4*   ANIONGAP 13 13 13     Recent Labs   Lab Test 04/04/24  0541 10/07/23  1950 10/03/23  0737 12/09/22  1444 08/10/21  0833 06/11/20  0810 06/10/20  1243 09/18/19  0739 02/04/19  0935 02/02/19  0615 02/01/19  1118 02/01/19  0845 07/30/18  0923 08/14/17  1028   ALBUMIN 2.1* 2.9* 4.4   < > 3.6   < > 3.6   < >  --    < >  --  4.3    < > 3.8   BILITOTAL 0.6 0.2 0.9   < > 0.5   < > 0.5   < >  --    < >  --  0.6   < > 0.3   * 15 22   < > 24   < > 30   < >  --    < >  --  75*   < > 37   * 18 25   < > 19   < > 21   < >  --    < >  --  39   < > 22   PROTEIN  --   --   --   --   --   --   --   --  Negative  --  Negative  --   --   --    LIPASE  --   --   --   --  132  --  63*  --   --   --   --  101  --  135   AMYLASE  --   --   --   --   --   --   --   --   --   --   --   --   --  46    < > = values in this interval not displayed.       I reviewed the patient's new imaging results.        CONSULTATION ASSESSMENT AND PLAN:      Shirley Hendricks is a 63 year old female with a PMHx  of PAD/PVD, Tobacco use, CAD (history of MI x3), HTN, HLP, DM2, COPD, GERD, Anemia, Chronic anticoagulation therapy with hx DVT/PE, lymphoma, Hx SBO admitted on 3/29/2024 due to occlusion of right LE bypass graft.  Pt presented to the ED due to right leg pain.  GI consulted for colonic distention and request to r/o Babar's anshu.      Colon distention, sigmoidoscopy to rule out obstruction prior to treating for robson.   AXR 4/3 shows numerous gas distended loops of small bowel and colon.  No free air.  CT  --NG placed. Ok to use for med administration.  Would hold off on tube feeds until improving.  --Hold Anticoagulation for now  --Hold on sigmoidoscopy now for.    --Will trial fleet enema.  Miralax thru NG.  --Repeat flat AXR tomorrow.  --Likely given watery stool output this is overflow constipation and will treat with bowel regimen as above.  --Continue to monitor, consider flex sig/colonoscopy tomorrow.    S/p Right common femoral artery to mid anterior tibial artery bypass graft acute occlusion  S/p right LE angiogram and lytic therapy 03/30   Acute Blood Loss Anemia  *Occurred despite low-dose Xarelto and Plavix therapy.    - Vascular surgery consult requested -> urgent guillotine right above knee amputation    - IR consulted and following  --Per  Vascular surgery consult Dr. Salomon was contacted and s/p right LE angiogram and lytic therapy 03/30 -> 03/31 took the patient to the angio suite to remove the sheath in the left groin with closure device.   - Hold PTA Xarelto 15 mg nightly.    - Defer resumption of PTA Plavix 75 mg/d to Vascular Surgery and/or IR.   - transfused 1 U PRBCs 03/30 /3/31 and 04/02.   - Follow Hgb. Closely monitor and transfuse for Hgb <7.     Recent Labs   Lab 04/04/24  0541 04/03/24  0423 04/02/24  1201 04/02/24  0404 04/01/24  0539   HGB 7.8* 8.8* 8.7* 6.7* 7.9*     History of SBO   Celiac disease  *Patient reported recent GI work-up revealed she has celiac disease.    - Once diet can be advanced, request no gluten/celiac diet.       Other hospital problems:  Rhabdomyolysis   LIMA  CAD (history of MI x3)  HTN  HLP  History of Takotsubo CM  Type 2 DM  Tobacco use  Diabetic neuropathy  COPD   GERD  Anemia  Recent GI bleed (12/2023 and admitted at [facility])  Spongiotic dermatitis  Chronic anticoagulation therapy with history of DVT/PE  OA  Charcot-Breonna-Tooth foot deformity  Stage VALENTIN marginal zone B-cell lymphoma  MDD with anxiety  Acute Delirium   Contrast dye allergy  Mild hyponatremia  History of SVT          [facility].  Office: [phone]  Cell: [phone] (Dr. Layne)  Cell: [phone](Jordan Fuller PA-C)

## 2024-04-04 NOTE — PROGRESS NOTES
[facility]    Medicine Progress Note - Hospitalist Service    Date of Admission:  3/29/2024  Date of Service: 04/04/2024    Assessment & Plan     Shirley Hendricks is a 63 year old female admitted on 3/29/2024 due to occlusion of right LE bypass graft.      Past medical history significant for PAD/PVD, Tobacco use D/O, CAD (history of MI x3), HTN, HLP, DM2, Neuropathy, COPD, GERD, Anemia, Spongiotic dermatitis, MDD with anxiety, Chronic anticoagulation therapy with history of DVT/PE, OA, Charcot-Breonna-Tooth foot deformity, Marginal zone B-cell lymphoma, History of SBO, History of Takotsubo CM, History of SVT.    Discussed with Dr. Mazariegos and reviewed ED notes and Vascular Surgery consult note.  Patient presented to the ED due to right leg pain that had begun ~ 1 hour prior to presentation.  She reported pain seems to worsen with walking and when she attempted to check a pulse in her leg it was absent.  She also described that the foot is colder than the left.      While in the lobby attempts to find right pedal pulse with doppler were unsuccessful.  Dr. Lozano of Vascular Surgery was contacted by the patient as well as Dr. Mazariegos.      Work-up completed while patient was in the lobby included right arterial LE US revealed the right common femoral artery to mid anterior tibial artery bypass graft occlusion.      After assessing patient labs were collected and included a CMP that revealed a sodium of 132, chloride of 97, CO2 of 21 and glucose of 103 otherwise within normal limits.  CBC with differential revealed an RDW of 15.7 otherwise unremarkable.    Right common femoral artery to mid anterior tibial artery bypass graft acute occlusion  PAD/PVD  Acute Blood Loss Anemia  *Occurred despite low-dose Xarelto and Plavix therapy.    - Vascular surgery consult requested -> urgent guillotine right above knee amputation today followed by close monitoring in the ICU.    - IR consulted and  following  --Per Vascular surgery consult Dr. Salomon was contacted and s/p right LE angiogram and lytic therapy 03/30 -> 03/31 took the patient to the angio suite to remove the sheath in the left groin with closure device.   - Hold PTA Xarelto 15 mg nightly.    - Defer resumption of PTA Plavix 75 mg/d to Vascular Surgery and/or IR.    - Statin and antihypertensive management as below.    - Pain management as needed  - Follow Hgb   - Cannot perform CTA of abdomen at this time and since hemodynamically stable ->  angiogram will see if there is any active extravasation.   - transfused 1 U PRBCs 03/30 / 03/31 and 04/02 and closely monitor.    Rhabdomyolysis   ARF  Assessment: CPK has been rising. Her serum creatinine and body urea nitrogen also rising. Renal US -> No obstruction demonstrated.   Plan:  - Follow CK / BMP daily  - Nephrology following -> now on HD, Decreased BP following dilaudid for pain and 25% albumin ordered today with improvement in BPs  - Alvarez catheter in place  - Avoid NSAIDs / nephrotoxins    Colon distention  Abdominal Pain  Assessment:CT abdomen 04/03 -> Medium-sized right retroperitoneal and extraperitoneal hematoma. Evaluation for extravasation is unable to be performed without contrast. This is likely similar to slightly smaller than the CT lumbar spine although this is incompletely visualized at   that time  Extensive pulmonary opacities. Differential: Multifocal infection, ARDS, and/or pulmonary  Plan:  --NG placed. Ok to use for med administration.  Would hold off on tube feeds until improving.  --Hold Anticoagulation for now  --GI consulted ->  consider flex sig/colonoscopy tomorrow.  --Trial fleet enema.  Miralax thru NG.  --Repeat flat AXR tomorrow.  --Continue to monitor  --Pain control as needed    MDD with anxiety  Acute Delirium   *Noted on chart review.  No interventions.    Delirium from acute illness  IM Zyprexa as needed    Contrast dye allergy  - Ordered solumedrol and  benadryl per contrast dye allergy protocol.      Mild hyponatremia  - BMP daily    Tobacco Use D/O   Patient currently smokes max 5 cigarettes per day.     - Counseled regarding smoking cessation that should also continue with PCP.    - Nicoderm patch ordered.    Recent celiac disease  *Patient reported recent GI work-up revealed she has celiac disease.    - Once diet can be advanced would request no gluten/celiac diet.      CAD (history of MI x3)  HTN  HLP  *Last coronary angiogram completed 10/2023.    - Resumed on PTA Coreg 6.25 mg BID, hold lisinopril 10 mg/d due to LIMA, continue Norvasc 2.5 mg/d.  Hold parameters in place.    - hold PTA rosuvastatin 40 mg/d due to acute rhabdo  - PRN IV hydralazine 10 mg every 4 hours for SBP GREATER THAN 180.    - Monitor on telemetry.      History of Takotsubo CM  *Recovered EF (55-60%) from ECHO 11/2023.    - Resumed on PTA Coreg 6.25 mg BID (hold for SBP < 110), hold lisinopril 10 mg/d and  hold Jardiance 10 mg/d. For now given ARF    Type 2 DM  Diabetic neuropathy  *Noted diagnosis on chart review.  However, A1c of 5.2%.    - Hold PTA Jardiance 10 mg/d and gabapentin 100 mg TID due to AMS.  - No insulin as borderline hypoglycemic    COPD   - Resumed on PTA inhalers.    - Encourage use of Flutter valve/IS.      GERD  - Resumed on PTA omeprazole 20 mg/d.      Anemia  Recent GI bleed (12/2023 and admitted at [facility])  - Hold PTA Iron supplements and resume at discharge.    - CBC daily      Spongiotic dermatitis  *Recently seen at outpatient Derm.    - Resumed on PTA betamethasone cream BID.      Chronic anticoagulation therapy with history of DVT/PE  - Hold PTA Xarelto.      OA  - Pain management as above.      Charcot-Breonna-Tooth foot deformity  *Noted on chart review.  No interventions.     Stage VALENTIN marginal zone B-cell lymphoma  *Follows with [facility] (Dr. Barrow).    - Continue outpatient surveillance (CT scan in 8/2024).      History of SBO  *Noted on chart  review.  No interventions.    History of SVT  - Monitor on telemetry.    - Resumed on PTA Coreg as above.            Diet: NPO per Anesthesia Guidelines for Procedure/Surgery Except for: Meds  Adult Formula Drip Feeding: Continuous Osmolite 1.5; Nasoduodenal tube; Goal Rate: 40; mL/hr; Begin TF at 10 mL/hr and increase every 12 hours by 15 mL to goal; Do not advance tube feeding rate unless K+ is = or > 3.0, Mg++ is = or > 1.5, and Phos...    DVT Prophylaxis: Pneumatic Compression Devices  Alvarez Catheter: PRESENT, indication: ICU only: hourly urine output needed for patient care  Lines: PRESENT      PICC 03/31/24 Double Lumen Right Brachial vein medial access-Site Assessment: WDL  CVC Double Lumen Right Internal jugular Tunneled-Site Assessment: WDL      Cardiac Monitoring: ACTIVE order. Indication: ICU  Code Status: Full Code      Clinically Significant Risk Factors         # Hypernatremia: Highest Na = 146 mmol/L in last 2 days, will monitor as appropriate     # Anion Gap Metabolic Acidosis: Highest Anion Gap = 19 mmol/L in last 2 days, will monitor and treat as appropriate  # Hypoalbuminemia: Lowest albumin = 2.1 g/dL at 4/4/2024  5:41 AM, will monitor as appropriate   # Thrombocytopenia: Lowest platelets = 54 in last 2 days, will monitor for bleeding   # Hypertension: Noted on problem list         # Severe Malnutrition: based on nutrition assessment    # Financial/Environmental Concerns:           Disposition Plan     Expected Discharge Date: 04/05/2024,  3:00 PM                Marcin White MD  Hospitalist Service  [facility]  Securely message with [employer] (more info)  Text page via [employer] Paging/Directory   ______________________________________________________________________    Interval History     No acute events overnight  Abdominal pain improved today  No fevers  No new CP/SOB   Less agitated and more alert / calm  Tolerating dialysis better today  No new complaints otherwise  Sister  at bedside    Physical Exam   Vital Signs: Temp: 99.7  F (37.6  C) Temp src: Bladder BP: 137/66 Pulse: 89   Resp: 17 SpO2: 95 % O2 Device: High Flow Nasal Cannula (HFNC) Oxygen Delivery: 30 LPM  Weight: 116 lbs 9.97 oz      Constitutional: Awake, alert, no apparent distress.    Pulmonary: CTABL  Cardiovascular: Regular rate and rhythm, normal S1 and S2, no S3 or S4, and no murmur noted.  GI: Normal bowel sounds, soft, non-distended, non-tender.    Neuro: Fahad but has sensory loss and is not able to wiggle toes but is able to plantarflex and dorsiflex.   Psych:  Alert and oriented to person only. Confused / encephalopathic. Normal affect.  Extremities: Wound VAC intact right open AKA   ----------------------------------------------------------------------------------------    Medical Decision Making       55 MINUTES SPENT BY ME on the date of service doing chart review, history, exam, documentation & further activities per the note.      Data   ------------------------- PAST 24 HR DATA REVIEWED -----------------------------------------------    I have personally reviewed the following data over the past 24 hrs:    9.6  \   7.8 (L)   / 54 (L)     136 100 24.8 (H) /  87   3.8 23 1.67 (H) \     ALT: 326 (H) AST: 301 (H) AP: 192 (H) TBILI: 0.6   ALB: 2.1 (L) TOT PROTEIN: 4.2 (L) LIPASE: N/A     Procal: N/A CRP: N/A Lactic Acid: 0.8         Imaging results reviewed over the past 24 hrs:   Recent Results (from the past 24 hour(s))   XR Abdomen Port 1 View    Narrative    EXAM: XR ABDOMEN PORT 1 VIEW  LOCATION: [facility]  DATE: 4/3/2024    INDICATION: Verify small bowel feeding tube bedside placement  COMPARISON: None.      Impression    IMPRESSION: Feeding tube tip located in the third portion of the duodenum. Numerous gas distended loops of small bowel and colon. No free air. Clips right upper quadrant from cholecystectomy. Left and right common iliac artery stents.     CT Abdomen Pelvis w/o  Contrast    Narrative    EXAM: CT ABDOMEN PELVIS W/O CONTRAST  LOCATION: [facility]  DATE: 4/3/2024    INDICATION: abdominal pain  COMPARISON: CT chest, abdomen, pelvis from 01/26/2024. Lumbar spine CT from 03/30/2024  TECHNIQUE: CT scan of the abdomen and pelvis was performed without IV contrast. Multiplanar reformats were obtained. Dose reduction techniques were used.  CONTRAST: None.    FINDINGS: Image quality is moderately degraded by motion artifact limiting evaluation.   LOWER CHEST: Small bilateral pleural effusions. Extensive pulmonary opacities. Extensive coronary atherosclerosis. Partial visualized central line terminating at the cavoatrial junction.    HEPATOBILIARY: Cholecystectomy.    PANCREAS: Normal.    SPLEEN: Normal.    ADRENAL GLANDS: Nodular thickening of the adrenal glands.    KIDNEYS/BLADDER: There is heterogeneous patchy retention of contrast in the right kidney indicative of renal dysfunction. The left kidney is moderately atrophic. The bladder is decompressed with Alvarez catheter. Contrast seen within the bladder. No   hydronephrosis.    BOWEL: Mild fluid-filled distention of the small and large bowel. No obstruction. There is enteric contrast in the colon. Feeding tube terminates in the proximal jejunum. The stomach is incompletely distended accentuating wall thickening. Small volume   ascites. Some of the ascites is mildly complex. In addition, there is a retroperitoneal and extraperitoneal hematoma beginning in the right lower quadrant extending along the right retroperitoneum and in the space of Retzius.    LYMPH NODES: Normal.    VASCULATURE: Extensive atherosclerosis. Aortobiiliac bypass with iliac stents, right external iliac/common femoral stent, and partial visualized right lower extremity bypass graft.     PELVIC ORGANS: Unremarkable.    MUSCULOSKELETAL: Marked diffuse anasarca. Small amount of edema and/or blood products along the bilateral inguinal  fossa.      Impression    IMPRESSION:   1.  Medium-sized right retroperitoneal and extraperitoneal hematoma. Evaluation for extravasation is unable to be performed without contrast. This is likely similar to slightly smaller than the CT lumbar spine although this is incompletely visualized at   that time  2.  Extensive pulmonary opacities. Differential: Multifocal infection, ARDS, and/or pulmonary edema.  3.  Manifestations of third spacing and ascites, or anasarca, and small bilateral effusions.  4.  Fluid-filled distention of the small large bowel can be seen in diarrheal state. No obstruction.  5.  Retention of contrast in the right kidney indicative of renal dysfunction.      Findings discussed with Dr. Law at 2119 hours.       ------------------------- ENCOUNTER LABS ----------------------------------------------------------------  Recent Labs   Lab 04/04/24  1252 04/04/24  1249 04/04/24  0805 04/04/24  0541 04/04/24  0040 04/03/24  0431 04/03/24  0423 04/02/24  1205 04/02/24  1201   WBC  --   --   --  9.6  --   --  9.2  --  7.5   HGB  --   --   --  7.8*  --   --  8.8*  --  8.7*   MCV  --   --   --  82  --   --  83  --  82   PLT  --   --   --  54*  --   --  57*  --  63*   NA  --  136  --  136 136   < > 138   < >  --    POTASSIUM  --  3.8  --  4.1 4.1   < > 4.9   < >  --    CHLORIDE  --  100  --  98 98   < > 99   < >  --    CO2  --  23  --  25 25   < > 22   < >  --    BUN  --  24.8*  --  55.3* 52.4*   < > 89.3*   < >  --    CR  --  1.67*  --  3.13* 2.93*   < > 4.02*   < >  --    ANIONGAP  --  13  --  13 13   < > 17*   < >  --    SONIA  --  7.6*  --  6.9* 6.9*   < > 6.2*   < >  --    GLC 87 83 103* 103* 121*   < > 92   < >  --    ALBUMIN  --   --   --  2.1*  --   --   --   --   --    PROTTOTAL  --   --   --  4.2*  --   --   --   --   --    BILITOTAL  --   --   --  0.6  --   --   --   --   --    ALKPHOS  --   --   --  192*  --   --   --   --   --    ALT  --   --   --  326*  --   --   --   --   --    AST  --   --    --  301*  --   --   --   --   --     < > = values in this interval not displayed.       Most Recent 3 CBC's:  Recent Labs   Lab Test 04/04/24  0541 04/03/24  0423 04/02/24  1201   WBC 9.6 9.2 7.5   HGB 7.8* 8.8* 8.7*   MCV 82 83 82   PLT 54* 57* 63*     Most Recent 3 BMP's:  Recent Labs   Lab Test 04/04/24  1252 04/04/24  1249 04/04/24  0805 04/04/24  0541 04/04/24  0040   NA  --  136  --  136 136   POTASSIUM  --  3.8  --  4.1 4.1   CHLORIDE  --  100  --  98 98   CO2  --  23  --  25 25   BUN  --  24.8*  --  55.3* 52.4*   CR  --  1.67*  --  3.13* 2.93*   ANIONGAP  --  13  --  13 13   SONIA  --  7.6*  --  6.9* 6.9*   GLC 87 83 103* 103* 121*     Most Recent 2 LFT's:  Recent Labs   Lab Test 04/04/24  0541 10/07/23  1950   * 18   * 15   ALKPHOS 192* 35   BILITOTAL 0.6 0.2     Most Recent 3 INR's:  Recent Labs   Lab Test 10/07/23  0516 10/06/23  0551 10/05/23  1014   INR 1.38* 1.08 0.95     Most Recent 3 Troponin's:  Recent Labs   Lab Test 06/10/20  1243 02/16/20  1824 09/18/19  0739   TROPI <0.015 <0.015 <0.015     Most Recent 3 BNP's:No lab results found.  Most Recent D-dimer:  Recent Labs   Lab Test 08/13/21  0934   DD 10.38*     Most Recent Cholesterol Panel:  Recent Labs   Lab Test 10/03/23  0941   CHOL 137   LDL 69   HDL 54   TRIG 68     Most Recent 6 Bacteria Isolates From Any Culture (See EPIC Reports for Culture Details):No lab results found.  Most Recent TSH and T4:  Recent Labs   Lab Test 01/05/21  1119 02/04/19  0934 03/28/17  0922   TSH 0.77   < > 1.37   T4  --   --  1.19    < > = values in this interval not displayed.     Most Recent Urinalysis:  Recent Labs   Lab Test 02/04/19  0935   COLOR Yellow   APPEARANCE Clear   URINEGLC Negative   URINEBILI Negative   URINEKETONE Negative   SG 1.010   UBLD Negative   URINEPH 6.0   PROTEIN Negative   UROBILINOGEN 0.2   NITRITE Negative   LEUKEST Negative   RBCU O - 2   WBCU 0 - 5     Most Recent ESR & CRP:  Recent Labs   Lab Test 11/13/23  1705   SED  39*

## 2024-04-04 NOTE — CONSULTS
SPIRITUAL HEALTH SERVICES Consult Note  [facility]  ICU    Visited with pt per request for continuing emotioinal/spiritual support while pt is in hospital. Daughter Malu at beside.    Per Malu, Shirley was experiencing significant pain (headache) which she attributed to dialysis this morning. Pt accepted offer of prayer for her recovery.    Malu indicates that pt enjoys visits from  in particular prayer. She requests a follow-up visit tomorrow (04/04/2024) when pt is feeling more up to it.    Levy Bravo  Associate   [facility] Spiritual Health Phone Line [phone]  Spiritual Health Pager [phone]    [facility] available 24/7 for emergent requests/referrals, either by paging the on-call  or by entering an ASAP/STAT consult in [employer], which will also page the on-call .

## 2024-04-04 NOTE — PROGRESS NOTES
Potassium   Date Value Ref Range Status   04/042024 4.1   3.4 - 5.3 mmol/L Final   12/29/2022 4.3 3.4 - 5.3 mmol/L Final   07/09/2021 5.2 3.4 - 5.3 mmol/L Final     Hemoglobin   Date Value Ref Range Status   04/03/2024 8.8 (L) 11.7 - 15.7 g/dL Final   07/09/2021 8.8 (L) 11.7 - 15.7 g/dL Final     Creatinine   Date Value Ref Range Status   04/03/2024 4.22 (H) 0.51 - 0.95 mg/dL Final   07/09/2021 0.77 0.52 - 1.04 mg/dL Final     Urea Nitrogen   Date Value Ref Range Status   04/03/2024 93.4 (H) 8.0 - 23.0 mg/dL Final   12/29/2022 17 7 - 30 mg/dL Final   07/09/2021 13 7 - 30 mg/dL Final     Sodium   Date Value Ref Range Status   04/03/2024 140 135 - 145 mmol/L Final     Comment:     Reference intervals for this test were updated on 09/26/2023 to more accurately reflect our healthy population. There may be differences in the flagging of prior results with similar values performed with this method. Interpretation of those prior results can be made in the context of the updated reference intervals.    07/09/2021 132 (L) 133 - 144 mmol/L Final     INR   Date Value Ref Range Status   10/07/2023 1.38 (H) 0.85 - 1.15 Final   09/24/2020 1.01 0.86 - 1.14 Final       DIALYSIS PROCEDURE NOTE    Hepatitis status of previous patient on machine log was verified safe to use with this patients hepatitis B status.    Patient dialyzed for 3 hrs per Dr Barboza. on a K3 bath with a net fluid removal of  2.9 L. Did not meet goal. Dr. Barboza aware.  A BFR of 250 ml/min was obtained via a right CVC.       BVP 71.2 L    The treatment plan was discussed with Dr. Barboza during the treatment.    Anticoagulant: none.  Line flushed, clamped and capped with heparin 1:1000 1.6 mL (1600 units) per lumen    Meds  given:  Albumin 5% 250 mL.   Complications: Just 37 min left on the run, noted some clotting in venous chamber 100 ml NS bolus administered x 1.  BFR M/T at 200 L/min for most of the run.  Pt C/O of N the last 7 min left on Tx, UF turned off  to the end.    Person educated: pt/family. Knowledge base minimal. Barriers to learning: new to dialysis and pt delirium. Educated on procedure via verbal mode. patient/family needs reinforcement.  ICEBOAT? Timeout performed pre-treatment  I: Patient was identified using 2 identifiers  C:  Consent Signed Yes  E: Equipment preventative maintenance is current and dialysis delivery system OK to use  Hepatitis B status:    Latest Reference Range & Units 04/03/24 04:23 04/03/24 11:41   Hep B Surface Agn Nonreactive  Nonreactive    Hepatitis B Surface Antibody Instrument Value <8.5 m[IU]/mL  <3.50   Hepatitis B Surface Antibody   Nonreactive     O: Dialysis orders present and complete prior to treatment  A: Vascular access verified and assessed prior to treatment  T: Treatment was performed at a clinically appropriate time  ?: Patient was allowed to ask questions and address concerns prior to treatment  See Adult Hemodialysis flowsheet in [facility] for further details and post assessment.  Machine water alarm in place and functioning. Transducer pods intact and checked every 15min.   Pt assisted with repositioning throughout dialysis treatment.  Tx completed by bedside.  Chlorine/Chloramine water system checked every 4 hours.      Post treatment report given to April Cavanaugh RN regarding 2.9 L of fluid removed, last BP  of 174/77.      Jasiel Boo RN  [facility]

## 2024-04-04 NOTE — PROVIDER NOTIFICATION
Vascular surgery contacted re: results of CT scan and rt retroperitoneal bleed.    Dr. Marron called back. MD familiar with patient. Will review latest scan but likely bleed is not new and medical team already aware. MD to call back to unit if any change in plan of care required.

## 2024-04-05 ENCOUNTER — APPOINTMENT (OUTPATIENT)
Dept: OCCUPATIONAL THERAPY | Facility: CLINIC | Age: 64
DRG: 270 | End: 2024-04-05
Payer: COMMERCIAL

## 2024-04-05 ENCOUNTER — APPOINTMENT (OUTPATIENT)
Dept: PHYSICAL THERAPY | Facility: CLINIC | Age: 64
DRG: 270 | End: 2024-04-05
Payer: COMMERCIAL

## 2024-04-05 ENCOUNTER — APPOINTMENT (OUTPATIENT)
Dept: GENERAL RADIOLOGY | Facility: CLINIC | Age: 64
DRG: 270 | End: 2024-04-05
Attending: PHYSICIAN ASSISTANT
Payer: COMMERCIAL

## 2024-04-05 LAB
ANION GAP SERPL CALCULATED.3IONS-SCNC: 11 MMOL/L (ref 7–15)
BACTERIA BLD CULT: NO GROWTH
BASE EXCESS BLDV CALC-SCNC: 1.7 MMOL/L (ref -3–3)
BUN SERPL-MCNC: 41.5 MG/DL (ref 8–23)
CALCIUM SERPL-MCNC: 7.3 MG/DL (ref 8.8–10.2)
CHLORIDE SERPL-SCNC: 101 MMOL/L (ref 98–107)
CK SERPL-CCNC: 2483 U/L (ref 26–192)
CK SERPL-CCNC: 2625 U/L (ref 26–192)
CK SERPL-CCNC: 2660 U/L (ref 26–192)
CREAT SERPL-MCNC: 2.67 MG/DL (ref 0.51–0.95)
DEPRECATED HCO3 PLAS-SCNC: 23 MMOL/L (ref 22–29)
EGFRCR SERPLBLD CKD-EPI 2021: 19 ML/MIN/1.73M2
ERYTHROCYTE [DISTWIDTH] IN BLOOD BY AUTOMATED COUNT: 15.9 % (ref 10–15)
GLUCOSE BLDC GLUCOMTR-MCNC: 108 MG/DL (ref 70–99)
GLUCOSE BLDC GLUCOMTR-MCNC: 119 MG/DL (ref 70–99)
GLUCOSE BLDC GLUCOMTR-MCNC: 134 MG/DL (ref 70–99)
GLUCOSE BLDC GLUCOMTR-MCNC: 136 MG/DL (ref 70–99)
GLUCOSE BLDC GLUCOMTR-MCNC: 97 MG/DL (ref 70–99)
GLUCOSE SERPL-MCNC: 110 MG/DL (ref 70–99)
HCO3 BLDV-SCNC: 28 MMOL/L (ref 21–28)
HCT VFR BLD AUTO: 22.1 % (ref 35–47)
HGB BLD-MCNC: 7.4 G/DL (ref 11.7–15.7)
MCH RBC QN AUTO: 28.5 PG (ref 26.5–33)
MCHC RBC AUTO-ENTMCNC: 33.5 G/DL (ref 31.5–36.5)
MCV RBC AUTO: 85 FL (ref 78–100)
O2/TOTAL GAS SETTING VFR VENT: 45 %
OXYHGB MFR BLDV: 74 % (ref 70–75)
PCO2 BLDV: 49 MM HG (ref 40–50)
PH BLDV: 7.36 [PH] (ref 7.32–7.43)
PLATELET # BLD AUTO: 47 10E3/UL (ref 150–450)
PO2 BLDV: 41 MM HG (ref 25–47)
POTASSIUM SERPL-SCNC: 4 MMOL/L (ref 3.4–5.3)
RBC # BLD AUTO: 2.6 10E6/UL (ref 3.8–5.2)
SAO2 % BLDV: 76.3 % (ref 70–75)
SODIUM SERPL-SCNC: 135 MMOL/L (ref 135–145)
WBC # BLD AUTO: 7.7 10E3/UL (ref 4–11)

## 2024-04-05 PROCEDURE — 94640 AIRWAY INHALATION TREATMENT: CPT

## 2024-04-05 PROCEDURE — 82805 BLOOD GASES W/O2 SATURATION: CPT | Performed by: STUDENT IN AN ORGANIZED HEALTH CARE EDUCATION/TRAINING PROGRAM

## 2024-04-05 PROCEDURE — 250N000013 HC RX MED GY IP 250 OP 250 PS 637: Performed by: STUDENT IN AN ORGANIZED HEALTH CARE EDUCATION/TRAINING PROGRAM

## 2024-04-05 PROCEDURE — 82550 ASSAY OF CK (CPK): CPT | Performed by: STUDENT IN AN ORGANIZED HEALTH CARE EDUCATION/TRAINING PROGRAM

## 2024-04-05 PROCEDURE — 250N000009 HC RX 250: Performed by: STUDENT IN AN ORGANIZED HEALTH CARE EDUCATION/TRAINING PROGRAM

## 2024-04-05 PROCEDURE — 97530 THERAPEUTIC ACTIVITIES: CPT | Mod: GO

## 2024-04-05 PROCEDURE — 85027 COMPLETE CBC AUTOMATED: CPT | Performed by: STUDENT IN AN ORGANIZED HEALTH CARE EDUCATION/TRAINING PROGRAM

## 2024-04-05 PROCEDURE — 90935 HEMODIALYSIS ONE EVALUATION: CPT

## 2024-04-05 PROCEDURE — 97530 THERAPEUTIC ACTIVITIES: CPT | Mod: GP | Performed by: PHYSICAL THERAPIST

## 2024-04-05 PROCEDURE — 250N000011 HC RX IP 250 OP 636: Performed by: STUDENT IN AN ORGANIZED HEALTH CARE EDUCATION/TRAINING PROGRAM

## 2024-04-05 PROCEDURE — 94640 AIRWAY INHALATION TREATMENT: CPT | Mod: 76

## 2024-04-05 PROCEDURE — 999N000215 HC STATISTIC HFNC ADULT NON-CPAP

## 2024-04-05 PROCEDURE — 99233 SBSQ HOSP IP/OBS HIGH 50: CPT | Performed by: STUDENT IN AN ORGANIZED HEALTH CARE EDUCATION/TRAINING PROGRAM

## 2024-04-05 PROCEDURE — 74018 RADEX ABDOMEN 1 VIEW: CPT

## 2024-04-05 PROCEDURE — 999N000157 HC STATISTIC RCP TIME EA 10 MIN

## 2024-04-05 PROCEDURE — 90935 HEMODIALYSIS ONE EVALUATION: CPT | Performed by: INTERNAL MEDICINE

## 2024-04-05 PROCEDURE — 80048 BASIC METABOLIC PNL TOTAL CA: CPT | Performed by: STUDENT IN AN ORGANIZED HEALTH CARE EDUCATION/TRAINING PROGRAM

## 2024-04-05 PROCEDURE — 250N000011 HC RX IP 250 OP 636: Performed by: INTERNAL MEDICINE

## 2024-04-05 PROCEDURE — 120N000001 HC R&B MED SURG/OB

## 2024-04-05 PROCEDURE — P9045 ALBUMIN (HUMAN), 5%, 250 ML: HCPCS | Mod: JZ | Performed by: INTERNAL MEDICINE

## 2024-04-05 RX ADMIN — ALBUTEROL SULFATE 2.5 MG: 2.5 SOLUTION RESPIRATORY (INHALATION) at 20:17

## 2024-04-05 RX ADMIN — ACETAMINOPHEN 650 MG: 325 TABLET, FILM COATED ORAL at 20:47

## 2024-04-05 RX ADMIN — CARVEDILOL 6.25 MG: 6.25 TABLET, FILM COATED ORAL at 17:22

## 2024-04-05 RX ADMIN — NICOTINE 1 PATCH: 14 PATCH, EXTENDED RELEASE TRANSDERMAL at 08:23

## 2024-04-05 RX ADMIN — DOCUSATE SODIUM LIQUID 100 MG: 100 LIQUID ORAL at 20:46

## 2024-04-05 RX ADMIN — ALBUMIN HUMAN 250 ML: 0.05 INJECTION, SOLUTION INTRAVENOUS at 08:10

## 2024-04-05 RX ADMIN — SENNOSIDES AND DOCUSATE SODIUM 1 TABLET: 8.6; 5 TABLET ORAL at 08:22

## 2024-04-05 RX ADMIN — ALBUTEROL SULFATE 2.5 MG: 2.5 SOLUTION RESPIRATORY (INHALATION) at 00:18

## 2024-04-05 RX ADMIN — OXYCODONE HYDROCHLORIDE 5 MG: 5 TABLET ORAL at 20:47

## 2024-04-05 RX ADMIN — BETAMETHASONE DIPROPIONATE: 0.5 CREAM TOPICAL at 08:46

## 2024-04-05 RX ADMIN — DOCUSATE SODIUM LIQUID 100 MG: 100 LIQUID ORAL at 08:28

## 2024-04-05 RX ADMIN — ALBUTEROL SULFATE 2.5 MG: 2.5 SOLUTION RESPIRATORY (INHALATION) at 07:30

## 2024-04-05 RX ADMIN — HYDROMORPHONE HYDROCHLORIDE 0.1 MG: 0.2 INJECTION, SOLUTION INTRAMUSCULAR; INTRAVENOUS; SUBCUTANEOUS at 15:10

## 2024-04-05 RX ADMIN — HEPARIN SODIUM 1600 UNITS: 1000 INJECTION INTRAVENOUS; SUBCUTANEOUS at 11:05

## 2024-04-05 RX ADMIN — BETAMETHASONE DIPROPIONATE: 0.5 CREAM TOPICAL at 22:38

## 2024-04-05 RX ADMIN — SENNOSIDES AND DOCUSATE SODIUM 1 TABLET: 8.6; 5 TABLET ORAL at 19:52

## 2024-04-05 RX ADMIN — HYDROMORPHONE HYDROCHLORIDE 0.2 MG: 0.2 INJECTION, SOLUTION INTRAMUSCULAR; INTRAVENOUS; SUBCUTANEOUS at 16:51

## 2024-04-05 RX ADMIN — ACETAMINOPHEN 650 MG: 325 TABLET, FILM COATED ORAL at 15:16

## 2024-04-05 RX ADMIN — ALBUTEROL SULFATE 2.5 MG: 2.5 SOLUTION RESPIRATORY (INHALATION) at 14:07

## 2024-04-05 RX ADMIN — Medication 5 ML: at 15:02

## 2024-04-05 RX ADMIN — ACETAMINOPHEN 650 MG: 325 TABLET, FILM COATED ORAL at 08:46

## 2024-04-05 RX ADMIN — CARVEDILOL 6.25 MG: 6.25 TABLET, FILM COATED ORAL at 09:21

## 2024-04-05 RX ADMIN — HYDROMORPHONE HYDROCHLORIDE 0.2 MG: 0.2 INJECTION, SOLUTION INTRAMUSCULAR; INTRAVENOUS; SUBCUTANEOUS at 10:58

## 2024-04-05 RX ADMIN — HYDROMORPHONE HYDROCHLORIDE 0.2 MG: 0.2 INJECTION, SOLUTION INTRAMUSCULAR; INTRAVENOUS; SUBCUTANEOUS at 19:48

## 2024-04-05 RX ADMIN — OXYCODONE HYDROCHLORIDE 5 MG: 5 TABLET ORAL at 08:46

## 2024-04-05 RX ADMIN — POLYETHYLENE GLYCOL 3350 17 G: 17 POWDER, FOR SOLUTION ORAL at 08:22

## 2024-04-05 RX ADMIN — AMLODIPINE BESYLATE 2.5 MG: 2.5 TABLET ORAL at 14:19

## 2024-04-05 ASSESSMENT — ACTIVITIES OF DAILY LIVING (ADL)
ADLS_ACUITY_SCORE: 40
ADLS_ACUITY_SCORE: 38
ADLS_ACUITY_SCORE: 40
ADLS_ACUITY_SCORE: 40
ADLS_ACUITY_SCORE: 38
ADLS_ACUITY_SCORE: 40

## 2024-04-05 NOTE — CONSULTS
"SPIRITUAL HEALTH SERVICES - Consult Note  [facility] ICU    Assessment    Saw pt Shirley Hendricks and sister (Sera) per follow up request.    Patient/Family Understanding of Illness and Goals of Care -   Sera shared that Shirley is doing better today and is sleeping a lot.  Sera shared that she expects Shirley to transfer to the [facility] unit.    Distress and Loss -   Sera shared that she is \"doing okay\" while supporting Shirley in the hospital. She said Shirley was \"in and out\" today.  Sera shared that Shirley has been saying \"okay, okay, okay\" repeatedly. When Sera asks Shirley who she's talking to, Shirley says she is talking to her mother. Sera shared that their mother has passed away.    Strengths, Coping, and Resources -   Shirley is supported by her sisters and children while she has been in the hospital.    Meaning, Beliefs, and Spirituality -   Shirley woke up briefly during my visit. Prayer was welcomed and provided. Shirley expressed gratitude for the visit.    Plan: [facility] will continue to support Shirley and her family during her hospitalization.     Mira Nava (they/Ramon lee  Spiritual Health Services  Chaplain Resident    [facility] routine referrals?*[phone]  [facility] available 24/7 for emergent requests/referrals, either by paging the on-call  or by entering an ASAP/STAT consult in [employer] (this will also page the on-call ).     "

## 2024-04-05 NOTE — PROGRESS NOTES
Potassium   Date Value Ref Range Status   04/05/2024 4.0 3.4 - 5.3 mmol/L Final   12/29/2022 4.3 3.4 - 5.3 mmol/L Final   07/09/2021 5.2 3.4 - 5.3 mmol/L Final     Hemoglobin   Date Value Ref Range Status   04/05/2024 7.4 (L) 11.7 - 15.7 g/dL Final   07/09/2021 8.8 (L) 11.7 - 15.7 g/dL Final     Creatinine   Date Value Ref Range Status   04/05/2024 2.67 (H) 0.51 - 0.95 mg/dL Final   07/09/2021 0.77 0.52 - 1.04 mg/dL Final     Urea Nitrogen   Date Value Ref Range Status   04/05/2024 41.5 (H) 8.0 - 23.0 mg/dL Final   12/29/2022 17 7 - 30 mg/dL Final   07/09/2021 13 7 - 30 mg/dL Final     Sodium   Date Value Ref Range Status   04/05/2024 135 135 - 145 mmol/L Final     Comment:     Reference intervals for this test were updated on 09/26/2023 to more accurately reflect our healthy population. There may be differences in the flagging of prior results with similar values performed with this method. Interpretation of those prior results can be made in the context of the updated reference intervals.    07/09/2021 132 (L) 133 - 144 mmol/L Final     INR   Date Value Ref Range Status   10/07/2023 1.38 (H) 0.85 - 1.15 Final   09/24/2020 1.01 0.86 - 1.14 Final       DIALYSIS PROCEDURE NOTE  Hepatitis status of previous patient on machine log was checked and verified ok to use with this patients hepatitis status.  Patient dialyzed for 3 hrs. on a K3 bath with a net fluid removal of  2.2L.  A BFR of 300 ml/min was obtained via a R CVC.      The treatment plan was discussed with Dr. Barboza during the treatment.    Total heparin received during the treatment: 0 units.    Line flushed, clamped and capped with heparin 1:1000 1.6 mL (1600 units) per lumen    Meds  given: Albumin prime, see MAR     Complications: UF goal lowered due to a drop in SBP mid run.  VSS remainder of txMD Ortiz notified, total UF 2200ml.      ICEBOAT? Timeout performed pre-treatment  I: Patient was identified using 2 identifiers  C:  Consent Signed Yes  E:  Equipment preventative maintenance is current and dialysis delivery system OK to use  B: Hepatitis B Surface Antigen: negative; Draw Date: 4/3/24      Hepatitis B Surface Antibody: immune; Draw Date: 4/3/24  O: Dialysis orders present and complete prior to treatment  A: Vascular access verified and assessed prior to treatment  T: Treatment was performed at a clinically appropriate time  ?: Patient was allowed to ask questions and address concerns prior to treatment  See Adult Hemodialysis flowsheet in [facility] for further details and post assessment.  Machine water alarm in place and functioning. Transducer pods intact and checked every 15min.   Chlorine/Chloramine water system checked every 4 hours.  Outpatient Dialysis at [facility]    Patient repositioned every 2 hours during the treatment.  Post treatment report given to CURT Pink RN regarding 2.2L of fluid removed and last BP of 135/76 .

## 2024-04-05 NOTE — PROGRESS NOTES
[facility], [place]    Medicine Progress Note - Hospitalist Service    Date of Admission:  3/29/2024  Date of Service: 04/05/2024    Assessment & Plan     Shirley Hendricks is a 63 year old female admitted on 3/29/2024 due to occlusion of right LE bypass graft.      Past medical history significant for PAD/PVD, Tobacco use D/O, CAD (history of MI x3), HTN, HLP, DM2, Neuropathy, COPD, GERD, Anemia, Spongiotic dermatitis, MDD with anxiety, Chronic anticoagulation therapy with history of DVT/PE, OA, Charcot-Breonna-Tooth foot deformity, Marginal zone B-cell lymphoma, History of SBO, History of Takotsubo CM, History of SVT.    Discussed with Dr. Mazariegos and reviewed ED notes and Vascular Surgery consult note.  Patient presented to the ED due to right leg pain that had begun ~ 1 hour prior to presentation.  She reported pain seems to worsen with walking and when she attempted to check a pulse in her leg it was absent.  She also described that the foot is colder than the left.      While in the lobby attempts to find right pedal pulse with doppler were unsuccessful.  Dr. Lozano of Vascular Surgery was contacted by the patient as well as Dr. Mazariegos.      Work-up completed while patient was in the lobby included right arterial LE US revealed the right common femoral artery to mid anterior tibial artery bypass graft occlusion.      After assessing patient labs were collected and included a CMP that revealed a sodium of 132, chloride of 97, CO2 of 21 and glucose of 103 otherwise within normal limits.  CBC with differential revealed an RDW of 15.7 otherwise unremarkable.    Right common femoral artery to mid anterior tibial artery bypass graft acute occlusion  PAD/PVD  Acute Blood Loss Anemia  *Occurred despite low-dose Xarelto and Plavix therapy.    - Vascular surgery consult requested -> urgent guillotine right above knee amputation today followed by close monitoring in the ICU.    - IR consulted and  following  --Per Vascular surgery consult Dr. Salomon was contacted and s/p right LE angiogram and lytic therapy 03/30 -> 03/31 took the patient to the angio suite to remove the sheath in the left groin with closure device.   - Hold PTA Xarelto 15 mg nightly.    - Defer resumption of PTA Plavix 75 mg/d to Vascular Surgery and/or IR -> plavix to restart once platelets improve, continue to monitor bypass in LLE   - Statin and antihypertensive management as below.    - Pain management as needed  - Follow Hgb   - Cannot perform CTA of abdomen at this time and since hemodynamically stable ->  angiogram will see if there is any active extravasation.   - transfused 1 U PRBCs 03/30 / 03/31 and 04/02 and closely monitor.    Rhabdomyolysis   ARF  Assessment: CPK has been rising. Her serum creatinine and body urea nitrogen also rising. Renal US -> No obstruction demonstrated.   Plan:  - Follow CK / BMP daily  - Nephrology following -> now on HD, Decreased BP following dilaudid for pain and 25% albumin ordered today with improvement in BPs  - Alvarez catheter in place  - Avoid NSAIDs / nephrotoxins    Colon distention  Abdominal Pain  Assessment:CT abdomen 04/03 -> Medium-sized right retroperitoneal and extraperitoneal hematoma. Evaluation for extravasation is unable to be performed without contrast. This is likely similar to slightly smaller than the CT lumbar spine although this is incompletely visualized at   that time  Extensive pulmonary opacities. Differential: Multifocal infection, ARDS, and/or pulmonary  Plan:  --NG placed. Ok to use for med administration.  Would hold off on tube feeds until improving.  --Hold Anticoagulation for now  --GI consulted ->  consider flex sig/colonoscopy as outpatient. Okay to start TFs  --Trial fleet enema.  Miralax thru NG.  --Repeat flat AXR 4/5: Nonobstructive bowel gas pattern. Gaseous distention of the colon. Enteric feeding tube tip in the fourth portion of the duodenum. Iliac stents,  cholecystectomy clips, and endoscopic clips in the right hemiabdomen.   --Continue to monitor  --Pain control as needed    MDD with anxiety  Acute Delirium   *Noted on chart review.  No interventions.    Delirium from acute illness  IM Zyprexa as needed    Contrast dye allergy  - Ordered solumedrol and benadryl per contrast dye allergy protocol.      Mild hyponatremia  - BMP daily    Tobacco Use D/O   Patient currently smokes max 5 cigarettes per day.     - Counseled regarding smoking cessation that should also continue with PCP.    - Nicoderm patch ordered.    Recent celiac disease  *Patient reported recent GI work-up revealed she has celiac disease.    - Once diet can be advanced would request no gluten/celiac diet.      CAD (history of MI x3)  HTN  HLP  *Last coronary angiogram completed 10/2023.    - Resumed on PTA Coreg 6.25 mg BID, hold lisinopril 10 mg/d due to LIMA, continue Norvasc 2.5 mg/d.  Hold parameters in place.    - hold PTA rosuvastatin 40 mg/d due to acute rhabdo  - PRN IV hydralazine 10 mg every 4 hours for SBP GREATER THAN 180.    - Monitor on telemetry.      History of Takotsubo CM  *Recovered EF (55-60%) from ECHO 11/2023.    - Resumed on PTA Coreg 6.25 mg BID (hold for SBP < 110), hold lisinopril 10 mg/d and  hold Jardiance 10 mg/d. For now given ARF    Type 2 DM  Diabetic neuropathy  *Noted diagnosis on chart review.  However, A1c of 5.2%.    - Hold PTA Jardiance 10 mg/d and gabapentin 100 mg TID due to AMS.  - No insulin as borderline hypoglycemic    COPD   - Resumed on PTA inhalers.    - Encourage use of Flutter valve/IS.      GERD  - Resumed on PTA omeprazole 20 mg/d.      Anemia  Recent GI bleed (12/2023 and admitted at [facility])  - Hold PTA Iron supplements and resume at discharge.    - CBC daily      Spongiotic dermatitis  *Recently seen at outpatient Derm.    - Resumed on PTA betamethasone cream BID.      Chronic anticoagulation therapy with history of DVT/PE  - Hold PTA Xarelto.       OA  - Pain management as above.      Charcot-Breonna-Tooth foot deformity  *Noted on chart review.  No interventions.     Stage VALENTIN marginal zone B-cell lymphoma  *Follows with [facility] (Dr. Barrow).    - Continue outpatient surveillance (CT scan in 8/2024).      History of SBO  *Noted on chart review.  No interventions.    History of SVT  - Monitor on telemetry.    - Resumed on PTA Coreg as above.            Diet: NPO per Anesthesia Guidelines for Procedure/Surgery Except for: Meds  Adult Formula Drip Feeding: Continuous Osmolite 1.5; Nasoduodenal tube; Goal Rate: 40; mL/hr; Begin TF at 10 mL/hr and increase every 12 hours by 15 mL to goal; Do not advance tube feeding rate unless K+ is = or > 3.0, Mg++ is = or > 1.5, and Phos...    DVT Prophylaxis: Pneumatic Compression Devices  Lavarez Catheter: Not present  Lines: PRESENT      PICC 03/31/24 Double Lumen Right Brachial vein medial access-Site Assessment: WDL  CVC Double Lumen Right Internal jugular Tunneled-Site Assessment: WDL      Cardiac Monitoring: ACTIVE order. Indication: ICU  Code Status: Full Code      Clinically Significant Risk Factors              # Hypoalbuminemia: Lowest albumin = 2.1 g/dL at 4/4/2024  5:41 AM, will monitor as appropriate   # Thrombocytopenia: Lowest platelets = 47 in last 2 days, will monitor for bleeding   # Hypertension: Noted on problem list         # Severe Malnutrition: based on nutrition assessment    # Financial/Environmental Concerns:           Disposition Plan             Marcin White MD  Hospitalist Service  [facility]  Securely message with [employer] (more info)  Text page via [employer] Paging/Directory   ______________________________________________________________________    Interval History     No acute events overnight  Abdominal pain stable today  Having loose BMs  No fevers  No new CP/SOB   No vomiting  Less agitated and more alert / calm, encephalopathy improving  No new complaints  otherwise    Physical Exam   Vital Signs: Temp: 99  F (37.2  C) Temp src: Bladder BP: (!) 164/109 Pulse: 98   Resp: 20 SpO2: 95 % O2 Device: High Flow Nasal Cannula (HFNC) Oxygen Delivery: 30 LPM  Weight: 116 lbs 9.97 oz      Constitutional: Awake, alert, no apparent distress.    Pulmonary: CTABL  Cardiovascular: Regular rate and rhythm, normal S1 and S2, no S3 or S4, and no murmur noted.  GI: Normal bowel sounds, soft, non-distended, non-tender.    Neuro: Fahad but has sensory loss and is not able to wiggle toes but is able to plantarflex and dorsiflex.   Psych:  Alert and oriented to person only. Confused / encephalopathic. Normal affect.  Extremities: Wound VAC intact right open AKA   ----------------------------------------------------------------------------------------    Medical Decision Making       50 MINUTES SPENT BY ME on the date of service doing chart review, history, exam, documentation & further activities per the note.      Data   ------------------------- PAST 24 HR DATA REVIEWED -----------------------------------------------    I have personally reviewed the following data over the past 24 hrs:    7.7  \   7.4 (L)   / 47 (LL)     135 101 41.5 (H) /  134 (H)   4.0 23 2.67 (H) \       Imaging results reviewed over the past 24 hrs:   Recent Results (from the past 24 hour(s))   XR Abdomen Port 1 View    Narrative    EXAM: XR ABDOMEN PORT 1 VIEW  LOCATION: [facility]  DATE: 4/5/2024    INDICATION: Eval for obstruction  COMPARISON: CT or 03/20/2024      Impression    IMPRESSION: Nonobstructive bowel gas pattern. Gaseous distention of the colon. Enteric feeding tube tip in the fourth portion of the duodenum. Iliac stents, cholecystectomy clips, and endoscopic clips in the right hemiabdomen.     ------------------------- ENCOUNTER LABS ----------------------------------------------------------------  Recent [employer]   Lab 04/05/24  1536 04/05/24  1135 04/05/24  0831 04/05/24  0615  04/04/24  2217 04/04/24  1252 04/04/24  1249 04/04/24  0805 04/04/24  0541 04/03/24  0431 04/03/24  0423   WBC  --   --   --  7.7  --   --   --   --  9.6  --  9.2   HGB  --   --   --  7.4*  --   --   --   --  7.8*  --  8.8*   MCV  --   --   --  85  --   --   --   --  82  --  83   PLT  --   --   --  47*  --   --   --   --  54*  --  57*   NA  --   --   --  135 135  --  136  --  136   < > 138   POTASSIUM  --   --   --  4.0 4.0  --  3.8  --  4.1   < > 4.9   CHLORIDE  --   --   --  101 101  --  100  --  98   < > 99   CO2  --   --   --  23 22  --  23  --  25   < > 22   BUN  --   --   --  41.5* 35.1*  --  24.8*  --  55.3*   < > 89.3*   CR  --   --   --  2.67* 2.33*  --  1.67*  --  3.13*   < > 4.02*   ANIONGAP  --   --   --  11 12  --  13  --  13   < > 17*   SONIA  --   --   --  7.3* 7.3*  --  7.6*  --  6.9*   < > 6.2*   * 97 108* 110* 114*   < > 83   < > 103*   < > 92   ALBUMIN  --   --   --   --   --   --   --   --  2.1*  --   --    PROTTOTAL  --   --   --   --   --   --   --   --  4.2*  --   --    BILITOTAL  --   --   --   --   --   --   --   --  0.6  --   --    ALKPHOS  --   --   --   --   --   --   --   --  192*  --   --    ALT  --   --   --   --   --   --   --   --  326*  --   --    AST  --   --   --   --   --   --   --   --  301*  --   --     < > = values in this interval not displayed.       Most Recent 3 CBC's:  Recent Labs   Lab Test 04/05/24  0615 04/04/24  0541 04/03/24  0423   WBC 7.7 9.6 9.2   HGB 7.4* 7.8* 8.8*   MCV 85 82 83   PLT 47* 54* 57*     Most Recent 3 BMP's:  Recent Labs   Lab Test 04/05/24  1536 04/05/24  1135 04/05/24  0831 04/05/24  0615 04/04/24  2217 04/04/24  1252 04/04/24  1249   NA  --   --   --  135 135  --  136   POTASSIUM  --   --   --  4.0 4.0  --  3.8   CHLORIDE  --   --   --  101 101  --  100   CO2  --   --   --  23 22  --  23   BUN  --   --   --  41.5* 35.1*  --  24.8*   CR  --   --   --  2.67* 2.33*  --  1.67*   ANIONGAP  --   --   --  11 12  --  13   SONIA  --   --   --  7.3*  7.3*  --  7.6*   * 97 108* 110* 114*   < > 83    < > = values in this interval not displayed.     Most Recent 2 LFT's:  Recent Labs   Lab Test 04/04/24  0541 10/07/23  1950   * 18   * 15   ALKPHOS 192* 35   BILITOTAL 0.6 0.2     Most Recent 3 INR's:  Recent Labs   Lab Test 10/07/23  0516 10/06/23  0551 10/05/23  1014   INR 1.38* 1.08 0.95     Most Recent 3 Troponin's:  Recent Labs   Lab Test 06/10/20  1243 02/16/20  1824 09/18/19  0739   TROPI <0.015 <0.015 <0.015     Most Recent 3 BNP's:No lab results found.  Most Recent D-dimer:  Recent Labs   Lab Test 08/13/21  0934   DD 10.38*     Most Recent Cholesterol Panel:  Recent Labs   Lab Test 10/03/23  0941   CHOL 137   LDL 69   HDL 54   TRIG 68     Most Recent 6 Bacteria Isolates From Any Culture (See EPIC Reports for Culture Details):No lab results found.  Most Recent TSH and T4:  Recent Labs   Lab Test 01/05/21  1119 02/04/19  0934 03/28/17  0922   TSH 0.77   < > 1.37   T4  --   --  1.19    < > = values in this interval not displayed.     Most Recent Urinalysis:  Recent Labs   Lab Test 02/04/19  0935   COLOR Yellow   APPEARANCE Clear   URINEGLC Negative   URINEBILI Negative   URINEKETONE Negative   SG 1.010   UBLD Negative   URINEPH 6.0   PROTEIN Negative   UROBILINOGEN 0.2   NITRITE Negative   LEUKEST Negative   RBCU O - 2   WBCU 0 - 5     Most Recent ESR & CRP:  Recent Labs   Lab Test 11/13/23  1705   SED 39*

## 2024-04-05 NOTE — PLAN OF CARE
Goal Outcome Evaluation: improving      Plan of Care Reviewed With: patient, spouse, child          Outcome Evaluation: Pt lethargic at times, intermitttent confusion but appears to be clearing. Following commands and speech is clearer in appropriate responses. C/O pain at times, best controlled with PO oxy. SR with HTN, restarted on po meds. Cough congested and productive at times, pt swallowing before able to suction. LS clear - dim on 30L/30-40%. Abd slightly distended with tenderness in RUQ. Suppository given, liquid output only; no formed or soft stool. Plan for enema this evening. Alvarez with limited UO, HD removed 2.9L.   Turned Q2 hours, no new skin issues noted; edema improving following HD. Wound vac in place, WOC to change Friday.   Family present and supportive thru shift, explained sitter criteria and that patients mentation is improving, therefore not requiring sit at this point.       Problem: Adult Inpatient Plan of Care  Goal: Absence of Hospital-Acquired Illness or Injury  Intervention: Prevent Skin Injury  Recent Flowsheet Documentation  Taken 4/4/2024 1600 by April Braga RN  Body Position:   turned   foot of bed elevated   heels elevated   lower extremity elevated   upper extremity elevated  Skin Protection:   adhesive use limited   incontinence pads utilized   pulse oximeter probe site changed   silicone foam dressing in place   skin to skin areas padded   skin to device areas padded   transparent dressing maintained  Device Skin Pressure Protection:   absorbent pad utilized/changed   adhesive use limited   skin-to-skin areas padded   pressure points protected   positioning supports utilized   tubing/devices free from skin contact  Taken 4/4/2024 1200 by April Braga RN  Body Position:   turned   foot of bed elevated   heels elevated   lower extremity elevated   upper extremity elevated  Skin Protection:   adhesive use limited   incontinence pads utilized   pulse  oximeter probe site changed   silicone foam dressing in place   skin to skin areas padded   skin to device areas padded   transparent dressing maintained  Device Skin Pressure Protection:   absorbent pad utilized/changed   adhesive use limited   skin-to-skin areas padded   pressure points protected   positioning supports utilized   tubing/devices free from skin contact  Taken 4/4/2024 1100 by April Braga RN  Body Position:   turned   lower extremity elevated   upper extremity elevated   heels elevated   legs elevated  Taken 4/4/2024 1000 by April Braga RN  Body Position: (slight adjust, HD ongoing with positional line) position maintained  Taken 4/4/2024 0800 by April Braga RN  Body Position:   turned   foot of bed elevated   heels elevated   lower extremity elevated   upper extremity elevated  Skin Protection:   adhesive use limited   incontinence pads utilized   pulse oximeter probe site changed   silicone foam dressing in place   skin to skin areas padded   skin to device areas padded   transparent dressing maintained  Device Skin Pressure Protection:   absorbent pad utilized/changed   adhesive use limited   skin-to-skin areas padded   pressure points protected   positioning supports utilized   tubing/devices free from skin contact

## 2024-04-05 NOTE — PROGRESS NOTES
Vascular note:     Wound VAC change by vascular team at bedside. Tissue appears healthy and viable with punctate bleeding. Surrounding skin is also viable with stable superficial ischemic skin on anterior thigh with small blister noted. No evidence of infection. Picture placed in media tab.     Next vac change by wound care team on Monday 4/8.    Gala Morales DO   Vascular Fellow

## 2024-04-05 NOTE — PROGRESS NOTES
Renal Medicine Inpatient Dialysis Note                                Shirley Hendricks MRN# [id number]   Age: 63 year old YOB: 1960   Date of Admission: 3/29/2024 Hospital LOS: 7          Assessment/Plan:     1.  Suspect modest CKD at baseline              -slow progression over time              -DN/vascular disease  2.  Last available UA 2019              -no quantification studies  3.  Acute Kidney Injury              -oliguric              -IV contrast                          -3 separate consecutive exposures               -elevated CK     Likely BAILEY +/- rhabdo +/- ischemic injury  Presuming no interruption of RBF     4.  Acidosis              -NAGMA              -small respiratory component   5.  Ischemic RLE due to occluded bypass graft   -AKA due to unsalvagable limb 04/01/24  6.  Hypocalcemia  7.  Anemia              -transfuse as indicated    Next 04/06/24      Interval History:     Dialysis yesterday  UF 2.9 liter  Modest BP decrease    Dialysis run parameters reviewed with dialysis RN at patient bedside.  Seen on run    5% albumin prime  3 liter goal    Stable mid run      ROS     GENERAL: NAD, No fever,chills  R: NEGATIVE for significant cough or SOB  CV: NEGATIVE for chest pain, palpitations  EXT: no change edema  ROS otherwise negative    Dialysis Parameters:     Vitals were reviewed  Patient Vitals for the past 12 hrs:   BP Temp Temp src Pulse Resp SpO2   04/05/24 0930 92/68 99.1  F (37.3  C) -- 75 13 96 %   04/05/24 0929 92/68 99.1  F (37.3  C) -- 78 18 96 %   04/05/24 0915 102/62 99.1  F (37.3  C) -- 70 15 97 %   04/05/24 0900 (!) 163/78 99.1  F (37.3  C) -- 83 23 93 %   04/05/24 0845 134/61 99.1  F (37.3  C) -- 80 22 96 %   04/05/24 0830 (!) 173/75 99.1  F (37.3  C) -- 92 25 96 %   04/05/24 0815 (!) 140/59 99.3  F (37.4  C) -- 80 12 96 %   04/05/24 0810 (!) 140/59 99.3  F (37.4  C) -- 78 11 97 %   04/05/24 0805 -- 99.3  F (37.4  C) -- 79 14 98 %   04/05/24 0800 127/52 99.3  F  "(37.4  C) Bladder 79 16 97 %   04/05/24 0730 -- -- -- -- -- 99 %   04/05/24 0700 117/59 99.5  F (37.5  C) -- 78 15 98 %   04/05/24 0600 (!) 153/67 99.5  F (37.5  C) -- 84 20 99 %   04/05/24 0500 120/63 99.3  F (37.4  C) -- 80 15 97 %   04/05/24 0400 125/52 99.3  F (37.4  C) Bladder 81 20 98 %   04/05/24 0300 123/56 99.3  F (37.4  C) -- 76 11 97 %   04/05/24 0200 129/65 99.1  F (37.3  C) -- 84 15 95 %   04/05/24 0100 139/65 99.1  F (37.3  C) -- 74 11 97 %   04/05/24 0000 116/57 98.8  F (37.1  C) Bladder 70 11 97 %   04/04/24 2300 123/64 98.8  F (37.1  C) -- 82 21 96 %   04/04/24 2247 -- 99  F (37.2  C) -- 77 11 94 %   04/04/24 2200 (!) 151/75 99.1  F (37.3  C) -- 92 15 96 %     Wt Readings from Last 4 Encounters:   04/04/24 52.9 kg (116 lb 10 oz)   01/08/24 49.8 kg (109 lb 12.8 oz)   12/12/23 50.8 kg (112 lb)   11/21/23 50.6 kg (111 lb 8 oz)     I/O last 3 completed shifts:  In: 529.58 [I.V.:309.58; NG/GT:220]  Out: 5837 [Urine:37; Other:5800]    Vitals:    03/29/24 1830 03/30/24 0600 03/31/24 0600 04/01/24 0600   Weight: 48 kg (105 lb 14.4 oz) 47.9 kg (105 lb 9.6 oz) 52.2 kg (115 lb 1.6 oz) 55.2 kg (121 lb 12.8 oz)    04/04/24 0600   Weight: 52.9 kg (116 lb 10 oz)       Current Weight:   Dry Weight: 45 ?  Dialysis Temp: 36.5  C  Access Device: TDC  Access Site: right  Dialyzer: Revaclear  Dialysis Bath: 3  Sodium Profile: n  UF Goal: 3  Blood Flow Rate (mL/min): 250  Total Treatment Time (hrs): 3  Heparin: Low dose as required      EPO dose: n  Zemplar: n  IV Fe: n      Medications and Allergies:     Reviewed      Physical Exam:     Seen and examined during course of dialysis run    BP 92/68   Pulse 75   Temp 99.1  F (37.3  C)   Resp 13   Ht 1.549 m (5' 1\")   Wt 52.9 kg (116 lb 10 oz)   LMP  (LMP Unknown)   SpO2 96%   BMI 22.04 kg/m      GENERAL: awake, alert, follows  HEENT: NC/AT, PERRLA, EOMI, non icteric, pharynx moist without lesion  RESP: coarse  CV: RRR, normal S1 S2  MS: BKA  SKIN: catheter site " clean without drainage    Data:       Recent Labs   Lab 04/05/24  0831 04/05/24  0615   NA  --  135   POTASSIUM  --  4.0   CHLORIDE  --  101   CO2  --  23   ANIONGAP  --  11   * 110*   BUN  --  41.5*   CR  --  2.67*   GFRESTIMATED  --  19*   SONIA  --  7.3*     Recent Labs   Lab 04/05/24  0615 04/04/24 2217 04/04/24  1249 04/04/24  0541 04/04/24  0040 04/03/24  1731 04/03/24  1141 04/03/24  0423 04/03/24  0116 04/02/24  1823   CR 2.67* 2.33* 1.67* 3.13* 2.93* 2.40* 4.22* 4.02* 3.92* 3.64*     Recent Labs   Lab Test 04/05/24  0615 04/04/24 2217 04/04/24  1249 04/04/24  0541 04/04/24  0040 04/03/24  1731 04/03/24  1141 04/03/24  0423 04/03/24  0116 04/02/24  1823    135 136 136 136 138 140 138 139 146*     Recent Labs   Lab 04/04/24  0541   ALBUMIN 2.1*     Recent Labs   Lab 04/05/24  0615 04/04/24  0541 04/03/24  0423 04/02/24  1201   HGB 7.4* 7.8* 8.8* 8.7*         G Nayan Barboza MD    [facility] - Nephrology  [phone]            Patient/Caregiver provided printed discharge information.

## 2024-04-05 NOTE — PLAN OF CARE
"  Problem: Adult Inpatient Plan of Care  Goal: Plan of Care Review  Description: The Plan of Care Review/Shift note should be completed every shift.  The Outcome Evaluation is a brief statement about your assessment that the patient is improving, declining, or no change.  This information will be displayed automatically on your shift  note.  Outcome: Progressing  Flowsheets (Taken 4/5/2024 0625)  Outcome Evaluation: Patient remains Lethargic, Neuro able to wake intermittent confusion, following commands. Slept for most of the night. Oliguric. Continue on IV fluids.  Plan of Care Reviewed With: patient  Overall Patient Progress: no change  Goal: Patient-Specific Goal (Individualized)  Description: You can add care plan individualizations to a care plan. Examples of Individualization might be:  \"Parent requests to be called daily at 9am for status\", \"I have a hard time hearing out of my right ear\", or \"Do not touch me to wake me up as it startles  me\".  Outcome: Progressing  Goal: Absence of Hospital-Acquired Illness or Injury  Outcome: Progressing  Intervention: Identify and Manage Fall Risk  Recent Flowsheet Documentation  Taken 4/5/2024 0400 by Jordan Mesa, RN  Safety Promotion/Fall Prevention:   activity supervised   lighting adjusted   bedside attendant  Taken 4/5/2024 0000 by Jordan Mesa, RN  Safety Promotion/Fall Prevention:   activity supervised   lighting adjusted   bedside attendant  Intervention: Prevent Skin Injury  Recent Flowsheet Documentation  Taken 4/5/2024 0400 by Jordan Mesa, RN  Body Position:   turned   side-lying   upper extremity elevated   lower extremity elevated  Skin Protection:   adhesive use limited   incontinence pads utilized   pulse oximeter probe site changed   silicone foam dressing in place   skin to skin areas padded   skin to device areas padded   transparent dressing maintained  Device Skin Pressure Protection:   absorbent pad utilized/changed   adhesive use limited   " skin-to-skin areas padded   pressure points protected   positioning supports utilized   tubing/devices free from skin contact  Taken 4/5/2024 0000 by Jordan Mesa RN  Body Position:   turned   side-lying   upper extremity elevated   lower extremity elevated  Skin Protection:   adhesive use limited   incontinence pads utilized   pulse oximeter probe site changed   silicone foam dressing in place   skin to skin areas padded   skin to device areas padded   transparent dressing maintained  Device Skin Pressure Protection:   absorbent pad utilized/changed   adhesive use limited   skin-to-skin areas padded   pressure points protected   positioning supports utilized   tubing/devices free from skin contact  Intervention: Prevent and Manage VTE (Venous Thromboembolism) Risk  Recent Flowsheet Documentation  Taken 4/5/2024 0400 by Jordan Mesa RN  VTE Prevention/Management: SCDs (sequential compression devices) off  Taken 4/5/2024 0000 by Jordan Mesa RN  VTE Prevention/Management: SCDs (sequential compression devices) off  Intervention: Prevent Infection  Recent Flowsheet Documentation  Taken 4/5/2024 0400 by Jordan Mesa RN  Infection Prevention:   personal protective equipment utilized   rest/sleep promoted   single patient room provided   hand hygiene promoted   equipment surfaces disinfected  Taken 4/5/2024 0000 by Jordan Mesa RN  Infection Prevention:   personal protective equipment utilized   rest/sleep promoted   single patient room provided   hand hygiene promoted   equipment surfaces disinfected  Goal: Optimal Comfort and Wellbeing  Outcome: Progressing  Intervention: Provide Person-Centered Care  Recent Flowsheet Documentation  Taken 4/5/2024 0400 by Jordan Mesa RN  Trust Relationship/Rapport:   care explained   reassurance provided   emotional support provided  Taken 4/5/2024 0000 by Jordan Mesa RN  Trust Relationship/Rapport:   care explained   reassurance provided   emotional support provided  Goal:  Readiness for Transition of Care  Outcome: Progressing   Goal Outcome Evaluation:      Plan of Care Reviewed With: patient    Overall Patient Progress: no changeOverall Patient Progress: no change    Outcome Evaluation: Patient remains Lethargic, Neuro able to wake intermittent confusion, following commands. Slept for most of the night. Oliguric. Continue on IV fluids.

## 2024-04-05 NOTE — PLAN OF CARE
"  Problem: Adult Inpatient Plan of Care  Goal: Plan of Care Review  Description: The Plan of Care Review/Shift note should be completed every shift.  The Outcome Evaluation is a brief statement about your assessment that the patient is improving, declining, or no change.  This information will be displayed automatically on your shift  note.  Outcome: Progressing  Flowsheets (Taken 4/4/2024 2346)  Plan of Care Reviewed With:   patient   sibling  Overall Patient Progress: no change  Goal: Patient-Specific Goal (Individualized)  Description: You can add care plan individualizations to a care plan. Examples of Individualization might be:  \"Parent requests to be called daily at 9am for status\", \"I have a hard time hearing out of my right ear\", or \"Do not touch me to wake me up as it startles  me\".  Outcome: Progressing  Goal: Absence of Hospital-Acquired Illness or Injury  Outcome: Progressing  Intervention: Identify and Manage Fall Risk  Recent Flowsheet Documentation  Taken 4/4/2024 2000 by Rahul Liu, RN  Safety Promotion/Fall Prevention:   activity supervised   lighting adjusted   bedside attendant  Intervention: Prevent Skin Injury  Recent Flowsheet Documentation  Taken 4/4/2024 2200 by Rahul Liu, RN  Body Position:   turned   side-lying   upper extremity elevated   lower extremity elevated  Taken 4/4/2024 2000 by Rahul Liu, RN  Body Position:   turned   side-lying   upper extremity elevated   lower extremity elevated  Skin Protection:   adhesive use limited   incontinence pads utilized   pulse oximeter probe site changed   silicone foam dressing in place   skin to skin areas padded   skin to device areas padded   transparent dressing maintained  Device Skin Pressure Protection:   absorbent pad utilized/changed   adhesive use limited   skin-to-skin areas padded   pressure points protected   positioning supports utilized   tubing/devices free from skin contact  Intervention: Prevent and Manage VTE " (Venous Thromboembolism) Risk  Recent Flowsheet Documentation  Taken 4/4/2024 2000 by Rahul Liu, RN  VTE Prevention/Management: SCDs (sequential compression devices) off  Intervention: Prevent Infection  Recent Flowsheet Documentation  Taken 4/4/2024 2000 by Rahul Liu, RN  Infection Prevention:   personal protective equipment utilized   rest/sleep promoted   single patient room provided   hand hygiene promoted   equipment surfaces disinfected  Goal: Optimal Comfort and Wellbeing  Outcome: Progressing  Intervention: Monitor Pain and Promote Comfort  Recent Flowsheet Documentation  Taken 4/4/2024 2000 by Rahul Liu, RN  Pain Management Interventions:   medication (see MAR)   music therapy  Intervention: Provide Person-Centered Care  Recent Flowsheet Documentation  Taken 4/4/2024 2000 by Rahul Lui, RN  Trust Relationship/Rapport:   care explained   reassurance provided   emotional support provided  Goal: Readiness for Transition of Care  Outcome: Progressing   Goal Outcome Evaluation:      Plan of Care Reviewed With: patient, sibling    Overall Patient Progress: no changeOverall Patient Progress: no change

## 2024-04-05 NOTE — PLAN OF CARE
Goal Outcome Evaluation:      Plan of Care Reviewed With: patient    Overall Patient Progress: no changeOverall Patient Progress: no change    Outcome Evaluation: Less lethargic today per family report. At 1600 oriented to self, place, time. Frequently moaning but unable to fully express where pain is/describe it. PRN oxy, dilaudid, tylenol given. Alvarez removed. 3-4 loose liquid BM. Tube feeds started at 10ml/hr, confirmed with GI and hospitalist that it is ok to start. Sister at bedside this afternoon and helpful to patient.      Problem: Adult Inpatient Plan of Care  Goal: Plan of Care Review  Description: The Plan of Care Review/Shift note should be completed every shift.  The Outcome Evaluation is a brief statement about your assessment that the patient is improving, declining, or no change.  This information will be displayed automatically on your shift  note.  Outcome: Progressing  Flowsheets (Taken 4/5/2024 1726)  Outcome Evaluation: Less lethargic today per family report. At 1600 oriented to self, place, time. Frequently moaning but unable to fully express where pain is/describe it. PRN oxy, dilaudid, tylenol given. Alvarez removed. 3-4 loose liquid BM. Tube feeds started at 10ml/hr, confirmed with GI and hospitalist that it is ok to start. Sister at bedside this afternoon and helpful to patient.  Plan of Care Reviewed With: patient  Overall Patient Progress: no change  Goal: Absence of Hospital-Acquired Illness or Injury  Intervention: Identify and Manage Fall Risk  Recent Flowsheet Documentation  Taken 4/5/2024 1600 by Akilah Pink, RN  Safety Promotion/Fall Prevention:   activity supervised   lighting adjusted   bedside attendant  Taken 4/5/2024 1200 by Akilah Pink, RN  Safety Promotion/Fall Prevention:   activity supervised   lighting adjusted   bedside attendant  Intervention: Prevent Skin Injury  Recent Flowsheet Documentation  Taken 4/5/2024 1600 by Akilah Pink, RN  Body Position:   turned   left    heels elevated   weight shifting   upper extremity elevated  Skin Protection:   adhesive use limited   incontinence pads utilized   pulse oximeter probe site changed   silicone foam dressing in place   skin to skin areas padded   skin to device areas padded   transparent dressing maintained  Device Skin Pressure Protection:   absorbent pad utilized/changed   adhesive use limited   skin-to-skin areas padded   pressure points protected   positioning supports utilized   tubing/devices free from skin contact  Taken 4/5/2024 1520 by Akilah Pink RN  Body Position: (sitting edge of bed) other (see comments)  Taken 4/5/2024 1400 by Akilah Pink RN  Body Position:   turned   right  Taken 4/5/2024 1200 by Akilah Pink RN  Body Position:   turned   left   heels elevated  Skin Protection:   adhesive use limited   incontinence pads utilized   pulse oximeter probe site changed   silicone foam dressing in place   skin to skin areas padded   skin to device areas padded   transparent dressing maintained  Device Skin Pressure Protection:   absorbent pad utilized/changed   adhesive use limited   skin-to-skin areas padded   pressure points protected   positioning supports utilized   tubing/devices free from skin contact  Taken 4/5/2024 1000 by Akilah Pink RN  Body Position:   turned   right   heels elevated   upper extremity elevated   weight shifting  Taken 4/5/2024 0800 by Akilah Pink RN  Body Position: (Primary nurse managing, will assist as needed)   turned   left   heels elevated   upper extremity elevated   weight shifting  Skin Protection:   adhesive use limited   incontinence pads utilized   pulse oximeter probe site changed   silicone foam dressing in place   skin to skin areas padded   skin to device areas padded   transparent dressing maintained  Device Skin Pressure Protection:   absorbent pad utilized/changed   adhesive use limited   skin-to-skin areas padded   pressure points protected   positioning supports  utilized   tubing/devices free from skin contact  Intervention: Prevent and Manage VTE (Venous Thromboembolism) Risk  Recent Flowsheet Documentation  Taken 4/5/2024 1600 by Akilah Pink, RN  VTE Prevention/Management: SCDs (sequential compression devices) off  Taken 4/5/2024 1200 by Akilah Pink, RN  VTE Prevention/Management: SCDs (sequential compression devices) off  Taken 4/5/2024 0800 by Akilah Pink, RN  VTE Prevention/Management: SCDs (sequential compression devices) off  Goal: Optimal Comfort and Wellbeing  Outcome: Not Progressing  Intervention: Provide Person-Centered Care  Recent Flowsheet Documentation  Taken 4/5/2024 1600 by Akilah Pink, RN  Trust Relationship/Rapport:   care explained   reassurance provided   emotional support provided  Taken 4/5/2024 1200 by Akilah Pink, RN  Trust Relationship/Rapport:   care explained   reassurance provided   emotional support provided  Taken 4/5/2024 0800 by Akilah Pink, RN  Trust Relationship/Rapport:   care explained   reassurance provided   emotional support provided

## 2024-04-05 NOTE — PROGRESS NOTES
VASCULAR SURGERY    Visited with patient and her daughter during dialysis run.  She is more stable and more alert.    Good graft pulse.  Bowel function has returned and will start oral intake.    Appreciate care of all teams.    Chadwick Lozano MD

## 2024-04-05 NOTE — PROGRESS NOTES
[facility]  Gastroenterology Progress Note     Shirley Hendricks MRN# [id number]   YOB: 1960 Age: 63 year old          Assessment and Plan:   Shirley Hendricks is a 63 year old female with a PMHx  of PAD/PVD, Tobacco use, CAD (history of MI x3), HTN, HLP, DM2, COPD, GERD, Anemia, Chronic anticoagulation therapy with hx DVT/PE, lymphoma, Hx SBO admitted on 3/29/2024 due to occlusion of right LE bypass graft.  Pt presented to the ED due to right leg pain.  GI consulted for colonic distention and request to r/o Babar's dz.        Colon distention, sigmoidoscopy to rule out obstruction prior to treating for robson.   AXR 4/3 shows numerous gas distended loops of small bowel and colon.  No free air.  CT  --NG placed.   --Hold Anticoagulation for now  --Hold on sigmoidoscopy now for.    --Given fleet enema.  Miralax thru NG.  Can repeat as needed.  --Repeat flat AXR 4/5: Nonobstructive bowel gas pattern. Gaseous distention of the colon. Enteric feeding tube tip in the fourth portion of the duodenum. Iliac stents, cholecystectomy clips, and endoscopic clips in the right hemiabdomen.   --Likely given watery stool output this is overflow constipation and will treat with bowel regimen as above.  --Consider flex sig/colonoscopy as outpatient or if patient's condition improves.  No indication to proceed today.  OK to start TF from GI standpoint.     S/p Right common femoral artery to mid anterior tibial artery bypass graft acute occlusion  S/p right LE angiogram and lytic therapy 03/30   Acute Blood Loss Anemia  *Occurred despite low-dose Xarelto and Plavix therapy.    - Vascular surgery consult requested -> urgent guillotine right above knee amputation    - IR consulted and following  --Per Vascular surgery consult Dr. Salomon was contacted and s/p right LE angiogram and lytic therapy 03/30 -> 03/31 took the patient to the angio suite to remove the sheath in the left groin  with closure device.   - Hold PTA Xarelto 15 mg nightly.    - Defer resumption of PTA Plavix 75 mg/d to Vascular Surgery and/or IR.   - transfused 1 U PRBCs 03/30 /3/31 and 04/02.   - Follow Hgb. Closely monitor and transfuse for Hgb <7.     Recent Labs   Lab 04/05/24  0615 04/04/24  0541 04/03/24  0423 04/02/24  1201 04/02/24  0404   HGB 7.4* 7.8* 8.8* 8.7* 6.7*       History of SBO   Celiac disease  *Patient reported recent GI work-up revealed she has celiac disease.    - Once diet can be advanced, request no gluten/celiac diet.       Other hospital problems:  Rhabdomyolysis   LIMA  CAD (history of MI x3)  HTN  HLP  History of Takotsubo CM  Type 2 DM  Tobacco use  Diabetic neuropathy  COPD   GERD  Anemia  Recent GI bleed (12/2023 and admitted at [facility])  Spongiotic dermatitis  Chronic anticoagulation therapy with history of DVT/PE  OA  Charcot-Breonna-Tooth foot deformity  Stage VALENTIN marginal zone B-cell lymphoma  MDD with anxiety  Acute Delirium   Contrast dye allergy  Mild hyponatremia  History of SVT    Interval History:    Patient is awake, appears more comfortable, alert to place and self  Requiring HFNC, increased work of breathing but decreased oxygen requirements to 30LPM from 40.  Abdomen is soft, tenderness to palpation of RUQ and RLQ without guarding or rigidity, minimally distended.  Sister present at bedside and updated.              Review of Systems:     C: NEGATIVE for fever, chills, change in weight  E/M: NEGATIVE for ear, mouth and throat problems  R: NEGATIVE for significant cough or SOB  CV: NEGATIVE for chest pain, palpitations or peripheral edema             Medications:   I have reviewed this patient's current medications  Current Facility-Administered Medications   Medication Dose Route Frequency Provider Last Rate Last Admin    - MEDICATION INSTRUCTIONS for Dialysis Patients -   Does not apply See Admin Instructions ELYSSA Barboza MD        albuterol (PROVENTIL) neb solution 2.5 mg  2.5  mg Nebulization Q6H Gala Morales DO   2.5 mg at 04/05/24 0730    amLODIPine (NORVASC) tablet 2.5 mg  2.5 mg Oral Daily Marcin White MD   2.5 mg at 04/04/24 1514    B and C vitamin Complex with folic acid (NEPHRONEX) liquid 5 mL  5 mL Per Feeding Tube Daily Gala Morales, DO   5 mL at 04/04/24 1740    betamethasone dipropionate (DIPROSONE) 0.05 % cream   Topical BID Gala Morales DO   Given at 04/05/24 0846    carvedilol (COREG) tablet 6.25 mg  6.25 mg Oral BID w/meals Marcin White MD   6.25 mg at 04/04/24 1950    Contraindications to both pharmacological and mechanical prophylaxis (must document contraindications for both in this order)   Does not apply See Admin Instructions Gala Morales DO        diphenhydrAMINE (BENADRYL) injection 50 mg  50 mg Intravenous DURING SURGERY Vineet Haro PA-C        docusate (COLACE) 50 MG/5ML liquid 100 mg  100 mg Oral or Feeding Tube BID Gala Morales DO   100 mg at 04/05/24 0828    [Held by provider] empagliflozin (JARDIANCE) tablet 10 mg  10 mg Oral Daily Kavin Coulter PA-C        famotidine (PEPCID) injection 20 mg  20 mg Intravenous Q48H Sofia Cristobal MD   20 mg at 04/04/24 0557    fluticasone-vilanterol (BREO ELLIPTA) 200-25 MCG/ACT inhaler 1 puff  1 puff Inhalation Daily Gala Morales DO   1 puff at 03/31/24 0911    [Held by provider] gabapentin (NEURONTIN) capsule 100 mg  100 mg Oral TID Gala Morales DO   100 mg at 04/02/24 1612    sodium chloride 0.9% DIALYSIS Cath LOCK - RED Lumen  10 mL Intracatheter Once in dialysis/CRRT ELYSSA Barboza MD        Followed by    heparin 1000 unit/mL DIALYSIS Cath LOCK - RED Lumen  1.3-2.6 mL Intracatheter Once in dialysis/CRRT ELYSSA Barboza MD        sodium chloride 0.9% DIALYSIS Cath LOCK - BLUE Lumen  10 mL Intracatheter Once in dialysis/CRRT ELYSSA Barboza MD        Followed by    heparin 1000 unit/mL DIALYSIS Cath LOCK -BLUE Lumen  1.3-2.6 mL  Intracatheter Once in dialysis/CRRT ELYSSA Barboza MD        insulin aspart (NovoLOG) injection (RAPID ACTING)  1-7 Units Subcutaneous TID AC Gala Morales DO        insulin aspart (NovoLOG) injection (RAPID ACTING)  1-5 Units Subcutaneous At Bedtime Gala Morales DO        nicotine (NICODERM CQ) 14 MG/24HR 24 hr patch 1 patch  1 patch Transdermal Daily Gala Morales DO   1 patch at 04/05/24 0823    No heparin via hemodialysis machine   Does not apply Once ELYSSA Barboza MD        [Held by provider] omeprazole (PriLOSEC) CR capsule 20 mg  20 mg Oral Daily Gala Morales DO   20 mg at 04/02/24 1047    polyethylene glycol (MIRALAX) Packet 17 g  17 g Oral Daily Gala Morales DO   17 g at 04/05/24 0822    senna-docusate (SENOKOT-S/PERICOLACE) 8.6-50 MG per tablet 1 tablet  1 tablet Oral or Feeding Tube BID Gala Morales DO   1 tablet at 04/05/24 0822    sodium chloride (PF) 0.9% PF flush 3 mL  3 mL Intracatheter Q8H Gala Morales, DO   3 mL at 04/05/24 0609    sodium chloride 0.9% BOLUS 300 mL  300 mL Hemodialysis Machine Once ELYSSA Barboza MD                      Physical Exam:   Vitals were reviewed  Vital Signs with Ranges  Temp:  [98.8  F (37.1  C)-99.7  F (37.6  C)] 99  F (37.2  C)  Pulse:  [] 83  Resp:  [11-31] 22  BP: ()/(52-93) 140/73  FiO2 (%):  [35 %-45 %] 40 %  SpO2:  [91 %-99 %] 99 %  I/O last 3 completed shifts:  In: 529.58 [I.V.:309.58; NG/GT:220]  Out: 2937 [Urine:37; Other:2900]  General: Patient is awake, appears more comfortable, alert to place and self  Resp: Requiring HFNC, increased work of breathing  Abdomen is soft, tenderness to palpation of RUQ and RLQ without guarding or rigidity, minimally distended  MSK:Wound vac in place of right knee stump.              Data:   I reviewed the patient's new clinical lab test results.   Recent Labs   Lab Test 04/05/24  0615 04/04/24  0541 04/03/24  0423 10/07/23  1704 10/07/23  0516 10/06/23  1928  10/06/23  0551 10/05/23  1014   WBC 7.7 9.6 9.2   < > 7.7   < > 9.3  --    HGB 7.4* 7.8* 8.8*   < > 10.0*   < > 10.8*  --    MCV 85 82 83   < > 87   < > 87  --    PLT 47* 54* 57*   < > 120*   < > 177  --    INR  --   --   --   --  1.38*  --  1.08 0.95    < > = values in this interval not displayed.     Recent Labs   Lab Test 04/05/24  0615 04/04/24  2217 04/04/24  1249   POTASSIUM 4.0 4.0 3.8   CHLORIDE 101 101 100   CO2 23 22 23   BUN 41.5* 35.1* 24.8*   ANIONGAP 11 12 13     Recent Labs   Lab Test 04/04/24  0541 10/07/23  1950 10/03/23  0737 12/09/22  1444 08/10/21  0833 06/11/20  0810 06/10/20  1243 09/18/19  0739 02/04/19  0935 02/02/19  0615 02/01/19  1118 02/01/19  0845 07/30/18  0923 08/14/17  1028   ALBUMIN 2.1* 2.9* 4.4   < > 3.6   < > 3.6   < >  --    < >  --  4.3   < > 3.8   BILITOTAL 0.6 0.2 0.9   < > 0.5   < > 0.5   < >  --    < >  --  0.6   < > 0.3   * 15 22   < > 24   < > 30   < >  --    < >  --  75*   < > 37   * 18 25   < > 19   < > 21   < >  --    < >  --  39   < > 22   PROTEIN  --   --   --   --   --   --   --   --  Negative  --  Negative  --   --   --    LIPASE  --   --   --   --  132  --  63*  --   --   --   --  101  --  135   AMYLASE  --   --   --   --   --   --   --   --   --   --   --   --   --  46    < > = values in this interval not displayed.       I reviewed the patient's new imaging results.    All laboratory data reviewed  All imaging studies reviewed by me.    DARREL Frias,  4/5/2024  [facility]  Office : [phone]  Cell: [phone] (Dr. Layne)  Cell: [phone] (Jordan Fuller PA-C)

## 2024-04-05 NOTE — PROGRESS NOTES
"VASCULAR SURGERY PROGRESS NOTE    Subjective:  Slightly improved mental state this AM, able to state she is in a hospital but does report continued right sided abdominal pain. No acute events.     Objective:  Intake/Output Summary (Last 24 hours) at 4/3/2024 0829  Last data filed at 4/3/2024 0600  Gross per 24 hour   Intake 673.33 ml   Output 68 ml   Net 605.33 ml     PHYSICAL EXAM:  /59   Pulse 78   Temp 99.5  F (37.5  C)   Resp 15   Ht 1.549 m (5' 1\")   Wt 52.9 kg (116 lb 10 oz)   LMP  (LMP Unknown)   SpO2 99%   BMI 22.04 kg/m    Patient is awake, appears more comfortable, alert to place and self  Requiring HFNC, increased work of breathing but decreased oxygen requirements to 30LPM from 40  Abdomen is soft, tenderness to palpation of RUQ and RLQ without guarding or rigidity, minimally distended  Wound vac in place of right through knee stump-minimal output. Discolored and ischemic superficial skin on lateral thigh where graft was removed. Bilateral groin 2+ femoral pulse-sutures in place, no skin breakdown is noted    Labs:   Na 135  K 4.0  CR 2.67 on HD  CK 2660  WBC 7.7  HGB 7.4  Platelets 47    ASSESSMENT:  63-year-old female well-known to the vascular surgery team with hx of:     2/15/2022: Distal popliteal artery occlusion with jump graft to distal popliteal/tibial arteries  5/6/2023: Right distal femoral to popliteal graft revision with cadaveric SFA to mid anterior tibial artery  10/7/2023: Emergent right femoral and anterior tibial thrombectomy.  Right femoral to anterior tibial Propaten 6 mm PTFE bypass (distal graft anastomosed to remaining several centimeters of cadaveric SFA graft ). graft anterior tibial enterectomy and vein patch angioplasty.  Dorsalis pedis thrombectomy   10/11/2023: Emergency right femoral to anterior tibial PTFE bypass graft thrombectomy with anterior tibial thrombectomy  1/4/24: Widely patent right leg bypass graft on duplex @office visit     Patient presented with " acute occlusion of right fem to anterior tibial PTFE/cadaveric SFA graft.  Initial heparin and lytics with angiogram notable for patent bypass but bleeding in groin resolved with Viabahn stent placement and embolization of side branch but with persistent decreased outflow and inability to tolerate further lytics due to continued hypofibrinogenemia, hematochezia, and worsening clinical status of right foot.     Now s/p right through knee guillotine amp on 4/1 with LIMA requiring HD and metabolic encephalopathy, improving      PLAN:  -cont HD per nephrology recs  -wean oxygen as tolerated  -cont tube feeds  -wound care to change vac to right stump today  -OOB and in chair, PT consult  -follow up GI recs, monitor for BM after miralax and enema  -cont NPO until pt more alert  -Consumptive coagulopathy noted, no indication for platelet transfusion at this time but repeat daily CBC  -No heparin, follow-up HIT panel  -plavix to restart once platelets improve, continue to monitor bypass in LLE  -appreciate medical teams involved in co management of Nel's care    Discussed pt history, exam, assessment and plan with Dr. minor of the vascular surgery service, who is in agreement with the above.    Gala Morales DO  Fellow  VASCULAR SURGERY

## 2024-04-06 LAB
ABO/RH(D): NORMAL
ANION GAP SERPL CALCULATED.3IONS-SCNC: 12 MMOL/L (ref 7–15)
ANTIBODY SCREEN: NEGATIVE
BACTERIA BLD CULT: NO GROWTH
BACTERIA TISS BX CULT: NO GROWTH
BLD PROD TYP BPU: NORMAL
BLOOD COMPONENT TYPE: NORMAL
BUN SERPL-MCNC: 35.7 MG/DL (ref 8–23)
CALCIUM SERPL-MCNC: 7.1 MG/DL (ref 8.8–10.2)
CHLORIDE SERPL-SCNC: 101 MMOL/L (ref 98–107)
CK SERPL-CCNC: 1767 U/L (ref 26–192)
CODING SYSTEM: NORMAL
CREAT SERPL-MCNC: 2.38 MG/DL (ref 0.51–0.95)
CROSSMATCH: NORMAL
DEPRECATED HCO3 PLAS-SCNC: 23 MMOL/L (ref 22–29)
EGFRCR SERPLBLD CKD-EPI 2021: 22 ML/MIN/1.73M2
ERYTHROCYTE [DISTWIDTH] IN BLOOD BY AUTOMATED COUNT: 15.3 % (ref 10–15)
ERYTHROCYTE [DISTWIDTH] IN BLOOD BY AUTOMATED COUNT: 16 % (ref 10–15)
GLUCOSE BLDC GLUCOMTR-MCNC: 114 MG/DL (ref 70–99)
GLUCOSE BLDC GLUCOMTR-MCNC: 118 MG/DL (ref 70–99)
GLUCOSE BLDC GLUCOMTR-MCNC: 119 MG/DL (ref 70–99)
GLUCOSE BLDC GLUCOMTR-MCNC: 126 MG/DL (ref 70–99)
GLUCOSE BLDC GLUCOMTR-MCNC: 128 MG/DL (ref 70–99)
GLUCOSE SERPL-MCNC: 135 MG/DL (ref 70–99)
HCT VFR BLD AUTO: 20.3 % (ref 35–47)
HCT VFR BLD AUTO: 25.1 % (ref 35–47)
HGB BLD-MCNC: 6.7 G/DL (ref 11.7–15.7)
HGB BLD-MCNC: 8.7 G/DL (ref 11.7–15.7)
ISSUE DATE AND TIME: NORMAL
MCH RBC QN AUTO: 28.4 PG (ref 26.5–33)
MCH RBC QN AUTO: 29.2 PG (ref 26.5–33)
MCHC RBC AUTO-ENTMCNC: 33 G/DL (ref 31.5–36.5)
MCHC RBC AUTO-ENTMCNC: 34.7 G/DL (ref 31.5–36.5)
MCV RBC AUTO: 84 FL (ref 78–100)
MCV RBC AUTO: 86 FL (ref 78–100)
PLATELET # BLD AUTO: 36 10E3/UL (ref 150–450)
PLATELET # BLD AUTO: 38 10E3/UL (ref 150–450)
POTASSIUM SERPL-SCNC: 3.7 MMOL/L (ref 3.4–5.3)
RBC # BLD AUTO: 2.36 10E6/UL (ref 3.8–5.2)
RBC # BLD AUTO: 2.98 10E6/UL (ref 3.8–5.2)
SODIUM SERPL-SCNC: 136 MMOL/L (ref 135–145)
SPECIMEN EXPIRATION DATE: NORMAL
UNIT ABO/RH: NORMAL
UNIT NUMBER: NORMAL
UNIT STATUS: NORMAL
UNIT TYPE ISBT: 5100
WBC # BLD AUTO: 7 10E3/UL (ref 4–11)
WBC # BLD AUTO: 7.1 10E3/UL (ref 4–11)

## 2024-04-06 PROCEDURE — 250N000009 HC RX 250: Performed by: STUDENT IN AN ORGANIZED HEALTH CARE EDUCATION/TRAINING PROGRAM

## 2024-04-06 PROCEDURE — 36415 COLL VENOUS BLD VENIPUNCTURE: CPT | Performed by: STUDENT IN AN ORGANIZED HEALTH CARE EDUCATION/TRAINING PROGRAM

## 2024-04-06 PROCEDURE — 85027 COMPLETE CBC AUTOMATED: CPT | Performed by: STUDENT IN AN ORGANIZED HEALTH CARE EDUCATION/TRAINING PROGRAM

## 2024-04-06 PROCEDURE — 258N000001 HC RX 258: Performed by: STUDENT IN AN ORGANIZED HEALTH CARE EDUCATION/TRAINING PROGRAM

## 2024-04-06 PROCEDURE — 86923 COMPATIBILITY TEST ELECTRIC: CPT | Performed by: SURGERY

## 2024-04-06 PROCEDURE — 94640 AIRWAY INHALATION TREATMENT: CPT

## 2024-04-06 PROCEDURE — 94640 AIRWAY INHALATION TREATMENT: CPT | Mod: 76

## 2024-04-06 PROCEDURE — 250N000011 HC RX IP 250 OP 636: Performed by: STUDENT IN AN ORGANIZED HEALTH CARE EDUCATION/TRAINING PROGRAM

## 2024-04-06 PROCEDURE — 250N000013 HC RX MED GY IP 250 OP 250 PS 637: Performed by: STUDENT IN AN ORGANIZED HEALTH CARE EDUCATION/TRAINING PROGRAM

## 2024-04-06 PROCEDURE — 82550 ASSAY OF CK (CPK): CPT | Performed by: STUDENT IN AN ORGANIZED HEALTH CARE EDUCATION/TRAINING PROGRAM

## 2024-04-06 PROCEDURE — 999N000157 HC STATISTIC RCP TIME EA 10 MIN

## 2024-04-06 PROCEDURE — 86923 COMPATIBILITY TEST ELECTRIC: CPT | Performed by: STUDENT IN AN ORGANIZED HEALTH CARE EDUCATION/TRAINING PROGRAM

## 2024-04-06 PROCEDURE — 120N000001 HC R&B MED SURG/OB

## 2024-04-06 PROCEDURE — 250N000011 HC RX IP 250 OP 636: Performed by: INTERNAL MEDICINE

## 2024-04-06 PROCEDURE — 258N000003 HC RX IP 258 OP 636: Performed by: INTERNAL MEDICINE

## 2024-04-06 PROCEDURE — 90935 HEMODIALYSIS ONE EVALUATION: CPT

## 2024-04-06 PROCEDURE — 80048 BASIC METABOLIC PNL TOTAL CA: CPT | Performed by: STUDENT IN AN ORGANIZED HEALTH CARE EDUCATION/TRAINING PROGRAM

## 2024-04-06 PROCEDURE — 99231 SBSQ HOSP IP/OBS SF/LOW 25: CPT | Mod: 24 | Performed by: SURGERY

## 2024-04-06 PROCEDURE — 90935 HEMODIALYSIS ONE EVALUATION: CPT | Performed by: INTERNAL MEDICINE

## 2024-04-06 PROCEDURE — 99233 SBSQ HOSP IP/OBS HIGH 50: CPT | Performed by: STUDENT IN AN ORGANIZED HEALTH CARE EDUCATION/TRAINING PROGRAM

## 2024-04-06 PROCEDURE — 86900 BLOOD TYPING SEROLOGIC ABO: CPT | Performed by: STUDENT IN AN ORGANIZED HEALTH CARE EDUCATION/TRAINING PROGRAM

## 2024-04-06 PROCEDURE — P9016 RBC LEUKOCYTES REDUCED: HCPCS | Performed by: SURGERY

## 2024-04-06 RX ADMIN — HYDROMORPHONE HYDROCHLORIDE 0.2 MG: 0.2 INJECTION, SOLUTION INTRAMUSCULAR; INTRAVENOUS; SUBCUTANEOUS at 20:28

## 2024-04-06 RX ADMIN — HYDROMORPHONE HYDROCHLORIDE 0.2 MG: 0.2 INJECTION, SOLUTION INTRAMUSCULAR; INTRAVENOUS; SUBCUTANEOUS at 08:37

## 2024-04-06 RX ADMIN — ACETAMINOPHEN 650 MG: 325 TABLET, FILM COATED ORAL at 17:34

## 2024-04-06 RX ADMIN — Medication: at 14:59

## 2024-04-06 RX ADMIN — ALBUTEROL SULFATE 2.5 MG: 2.5 SOLUTION RESPIRATORY (INHALATION) at 01:05

## 2024-04-06 RX ADMIN — ACETAMINOPHEN 650 MG: 325 TABLET, FILM COATED ORAL at 21:33

## 2024-04-06 RX ADMIN — CARVEDILOL 6.25 MG: 6.25 TABLET, FILM COATED ORAL at 08:43

## 2024-04-06 RX ADMIN — ALBUTEROL SULFATE 2.5 MG: 2.5 SOLUTION RESPIRATORY (INHALATION) at 13:00

## 2024-04-06 RX ADMIN — SENNOSIDES AND DOCUSATE SODIUM 1 TABLET: 8.6; 5 TABLET ORAL at 08:43

## 2024-04-06 RX ADMIN — SODIUM CHLORIDE 250 ML: 9 INJECTION, SOLUTION INTRAVENOUS at 14:57

## 2024-04-06 RX ADMIN — AMLODIPINE BESYLATE 2.5 MG: 2.5 TABLET ORAL at 08:43

## 2024-04-06 RX ADMIN — FAMOTIDINE 20 MG: 10 INJECTION, SOLUTION INTRAVENOUS at 06:52

## 2024-04-06 RX ADMIN — POLYETHYLENE GLYCOL 3350 17 G: 17 POWDER, FOR SOLUTION ORAL at 08:43

## 2024-04-06 RX ADMIN — HYDROMORPHONE HYDROCHLORIDE 0.2 MG: 0.2 INJECTION, SOLUTION INTRAMUSCULAR; INTRAVENOUS; SUBCUTANEOUS at 17:27

## 2024-04-06 RX ADMIN — VASOPRESSIN: 20 INJECTION, SOLUTION INTRAVENOUS at 03:25

## 2024-04-06 RX ADMIN — HYDROMORPHONE HYDROCHLORIDE 0.2 MG: 0.2 INJECTION, SOLUTION INTRAMUSCULAR; INTRAVENOUS; SUBCUTANEOUS at 12:43

## 2024-04-06 RX ADMIN — ACETAMINOPHEN 650 MG: 325 TABLET, FILM COATED ORAL at 08:43

## 2024-04-06 RX ADMIN — HYDROMORPHONE HYDROCHLORIDE 0.2 MG: 0.2 INJECTION, SOLUTION INTRAMUSCULAR; INTRAVENOUS; SUBCUTANEOUS at 01:52

## 2024-04-06 RX ADMIN — Medication 5 ML: at 17:42

## 2024-04-06 RX ADMIN — HYDROMORPHONE HYDROCHLORIDE 0.2 MG: 0.2 INJECTION, SOLUTION INTRAMUSCULAR; INTRAVENOUS; SUBCUTANEOUS at 14:42

## 2024-04-06 RX ADMIN — CARVEDILOL 6.25 MG: 6.25 TABLET, FILM COATED ORAL at 17:27

## 2024-04-06 RX ADMIN — HEPARIN SODIUM 1600 UNITS: 1000 INJECTION INTRAVENOUS; SUBCUTANEOUS at 14:59

## 2024-04-06 RX ADMIN — OXYCODONE HYDROCHLORIDE 5 MG: 5 TABLET ORAL at 03:08

## 2024-04-06 RX ADMIN — BETAMETHASONE DIPROPIONATE: 0.5 CREAM TOPICAL at 08:43

## 2024-04-06 RX ADMIN — OXYCODONE HYDROCHLORIDE 5 MG: 5 TABLET ORAL at 21:34

## 2024-04-06 RX ADMIN — SODIUM CHLORIDE 300 ML: 9 INJECTION, SOLUTION INTRAVENOUS at 14:57

## 2024-04-06 RX ADMIN — ACETAMINOPHEN 650 MG: 325 TABLET, FILM COATED ORAL at 12:42

## 2024-04-06 RX ADMIN — ALBUTEROL SULFATE 2.5 MG: 2.5 SOLUTION RESPIRATORY (INHALATION) at 07:46

## 2024-04-06 RX ADMIN — FLUTICASONE FUROATE AND VILANTEROL TRIFENATATE 1 PUFF: 200; 25 POWDER RESPIRATORY (INHALATION) at 08:46

## 2024-04-06 RX ADMIN — ALBUTEROL SULFATE 2.5 MG: 2.5 SOLUTION RESPIRATORY (INHALATION) at 20:37

## 2024-04-06 RX ADMIN — DOCUSATE SODIUM LIQUID 100 MG: 100 LIQUID ORAL at 08:43

## 2024-04-06 RX ADMIN — NICOTINE 1 PATCH: 14 PATCH, EXTENDED RELEASE TRANSDERMAL at 08:45

## 2024-04-06 RX ADMIN — HEPARIN SODIUM 1600 UNITS: 1000 INJECTION INTRAVENOUS; SUBCUTANEOUS at 14:58

## 2024-04-06 RX ADMIN — ACETAMINOPHEN 650 MG: 325 TABLET, FILM COATED ORAL at 03:08

## 2024-04-06 ASSESSMENT — ACTIVITIES OF DAILY LIVING (ADL)
ADLS_ACUITY_SCORE: 38

## 2024-04-06 NOTE — PLAN OF CARE
Goal Outcome Evaluation:      Plan of Care Reviewed With: patient    Overall Patient Progress: improvingOverall Patient Progress: improving       Surgery   Transfer from ICU yesterday evening around .   POD# 5 Amputation, above knee; right leg with wound vac dressing, excision of anteriotibial bypass graft, closure of (previous interventional radiology) left groin incision.  Behavior & Aggression: Green  Fall Risk: Yes  Orientation: A&Ox3. Intermittently confused/anxious at times.  ABNL VS/O2: VSS on 2-4L NC.   ABNL Labs: Hb.4. Creatinine: 2.38  Pain Management: PRN IV Dilaudid x1, Oxycodone, & Tylenol (okay per orders to take pills crushed through NG)  Bowel/Bladder: Oliguria- gets hemodialysis. BS audible. Incontinent of stool x2 overnight.   Drains: PICC in place- infusing fluids at 25 mL/hr. Nasoduodenal tube in place- tube feedings increased to 25 mL/hr around 0200- next increase is today around 1400.   Wounds/incisions: R AKA- wound vac in place. Scattered bruising noted. L groin site- CDI. R abdominal dressing- CDI. Edema to LLE & BL arms. Redness to coccyx/sacrum area- Mepliex applied.   Diet: NPO  Activity Level: Ax2 lift- turned & repositioned every 2 hours. PT/OT consulted   Anticipated  DC Date: Pending  Significant information: Pulses present with doppler

## 2024-04-06 NOTE — PROGRESS NOTES
Renal Medicine Inpatient Dialysis Note                                Shirley Hendricks MRN# [id number]   Age: 63 year old YOB: 1960   Date of Admission: 3/29/2024 Hospital LOS: 8          Assessment/Plan:     1.  Suspect modest CKD at baseline              -slow progression over time              -DN/vascular disease  2.  Last available UA 2019              -no quantification studies  3.  Acute Kidney Injury              -oliguric              -IV contrast                          -3 separate consecutive exposures               -elevated CK     Likely BAILEY +/- rhabdo +/- ischemic injury  Presuming no interruption of RBF     4.  Acidosis              -NAGMA              -small respiratory component   5.  Ischemic RLE due to occluded bypass graft   -AKA due to unsalvagable limb 04/01/24  6.  Hypocalcemia  7.  Anemia              -transfuse as indicated    Next 04/08/24      Interval History:       Dialysis run parameters reviewed with dialysis RN at patient bedside.  Seen on run    5% albumin prime  3 liter goal    Restless     Total UF 7100 to date     ROS     GENERAL: NAD, No fever,chills  R: NEGATIVE for significant cough or SOB  CV: NEGATIVE for chest pain, palpitations  EXT: no change edema  ROS otherwise negative    Dialysis Parameters:     Vitals were reviewed  Patient Vitals for the past 12 hrs:   BP Temp Temp src Pulse Resp SpO2 Weight   04/06/24 1430 123/83 -- -- 90 (!) 33 92 % --   04/06/24 1415 (!) 142/70 -- -- 94 (!) 31 94 % --   04/06/24 1400 (!) 171/80 -- -- 84 25 94 % --   04/06/24 1345 (!) 178/83 -- -- 83 24 93 % --   04/06/24 1343 (!) 165/79 -- -- 85 26 91 % --   04/06/24 1336 (!) 168/78 97.9  F (36.6  C) Axillary 85 28 94 % --   04/06/24 1230 (!) 163/68 98.6  F (37  C) Oral 79 18 99 % --   04/06/24 1208 (!) 149/59 99.1  F (37.3  C) Axillary 76 18 99 % --   04/06/24 1105 125/51 99  F (37.2  C) Axillary 75 18 98 % --   04/06/24 1016 108/56 98.3  F (36.8  C) Axillary 75 18 98 % --  "  04/06/24 1008 102/54 98.5  F (36.9  C) Axillary 79 18 94 % --   04/06/24 0837 (!) 160/68 98.3  F (36.8  C) Oral 93 -- 95 % --   04/06/24 0746 -- -- -- -- -- 97 % --   04/06/24 0635 -- -- -- -- -- -- 52.6 kg (115 lb 15.4 oz)   04/06/24 0305 (!) 154/63 97.9  F (36.6  C) Oral 82 20 98 % --     Wt Readings from Last 4 Encounters:   04/06/24 52.6 kg (115 lb 15.4 oz)   01/08/24 49.8 kg (109 lb 12.8 oz)   12/12/23 50.8 kg (112 lb)   11/21/23 50.6 kg (111 lb 8 oz)     I/O last 3 completed shifts:  In: 1510 [I.V.:1180; NG/GT:240]  Out: 2211 [Urine:11; Other:2200]    Vitals:    03/30/24 0600 03/31/24 0600 04/01/24 0600 04/04/24 0600   Weight: 47.9 kg (105 lb 9.6 oz) 52.2 kg (115 lb 1.6 oz) 55.2 kg (121 lb 12.8 oz) 52.9 kg (116 lb 10 oz)    04/06/24 0635   Weight: 52.6 kg (115 lb 15.4 oz)       Current Weight:   Dry Weight: 45 ?  Dialysis Temp: 36.5  C  Access Device: TDC  Access Site: right  Dialyzer: Revaclear  Dialysis Bath: 3  Sodium Profile: n  UF Goal: 3  Blood Flow Rate (mL/min): 250  Total Treatment Time (hrs): 3  Heparin: Low dose as required      EPO dose: n  Zemplar: n  IV Fe: n      Medications and Allergies:     Reviewed      Physical Exam:     Seen and examined during course of dialysis run    /83   Pulse 90   Temp 97.9  F (36.6  C) (Axillary)   Resp (!) 33   Ht 1.549 m (5' 1\")   Wt 52.6 kg (115 lb 15.4 oz)   LMP  (LMP Unknown)   SpO2 92%   BMI 21.91 kg/m      GENERAL: awake, alert, follows  HEENT: NC/AT, PERRLA, EOMI, non icteric, pharynx moist without lesion  RESP: coarse  CV: RRR, normal S1 S2  MS: BKA  SKIN: catheter site clean without drainage    Data:       Recent Labs   Lab 04/06/24  1208 04/06/24  0855 04/06/24  0653   NA  --   --  136   POTASSIUM  --   --  3.7   CHLORIDE  --   --  101   CO2  --   --  23   ANIONGAP  --   --  12   *   < > 135*   BUN  --   --  35.7*   CR  --   --  2.38*   GFRESTIMATED  --   --  22*   SONIA  --   --  7.1*    < > = values in this interval not displayed. "     Recent Labs   Lab 04/06/24  0653 04/05/24  0615 04/04/24 2217 04/04/24  1249 04/04/24  0541 04/04/24  0040 04/03/24  1731 04/03/24  1141 04/03/24  0423 04/03/24  0116   CR 2.38* 2.67* 2.33* 1.67* 3.13* 2.93* 2.40* 4.22* 4.02* 3.92*     Recent Labs   Lab Test 04/06/24  0653 04/05/24  0615 04/04/24 2217 04/04/24  1249 04/04/24  0541 04/04/24  0040 04/03/24  1731 04/03/24  1141 04/03/24  0423 04/03/24  0116    135 135 136 136 136 138 140 138 139     Recent Labs   Lab 04/04/24  0541   ALBUMIN 2.1*     Recent Labs   Lab 04/06/24  0653 04/05/24  0615 04/04/24  0541 04/03/24  0423   HGB 6.7* 7.4* 7.8* 8.8*         G Nayan Barboza MD    [facility] - Nephrology  [phone]

## 2024-04-06 NOTE — PROGRESS NOTES
Vascular Surgery Progress Note    S: Transferred to surgical floor last evening.  Comfortable.    O:   Vitals:  BP  Min: 92/68  Max: 190/83  Temp  Av.8  F (37.1  C)  Min: 97.8  F (36.6  C)  Max: 99.1  F (37.3  C)  Pulse  Av.9  Min: 64  Max: 98  I/O last 3 completed shifts:  In: 1540 [I.V.:1180; NG/GT:270]  Out: 2211 [Urine:11; Other:2200]    Physical Exam: Alert and appropriate.    Minimal urine output  Nasogastric tube feeding initiated      Wound VAC intact to the right AKA (disarticulation of the joint).  Changed yesterday     With pink viable tissue noted    +3 left graft pulse    K= 3.7   SCr= 2.38 glucose= 135   CK= 1767 (decreasing)  Hgb= 6.7    Assessment/Plan: #1.  Status post emergent AKA due to graft failure but could not be resolved.  Will eventually need formal AKA this coming week.     #2.  Oliguric LIMA on hemodialysis.  Multifactorial and partially related to ischemic leg.  Note admitted 3/29/2024 SCr= 0 point 6/7 and 3/30/2024 SCr= 0.45.  Hopefully this will resolve with time.  Being followed by nephrology and appreciate their care.  Still with evidence of fluid overload due to oliguria.     #3.  Anemia.  No evidence of bleeding (history of GI bleeding in the past but uncertain etiology while on anticoagulation with Eliquis which is no longer necessary since the right graft is down).  Will give 1 unit packed red blood cells today.      Wm. Blake MD

## 2024-04-06 NOTE — PROGRESS NOTES
Potassium   Date Value Ref Range Status   04/06/2024 3.7 3.4 - 5.3 mmol/L Final   12/29/2022 4.3 3.4 - 5.3 mmol/L Final   07/09/2021 5.2 3.4 - 5.3 mmol/L Final     Hemoglobin   Date Value Ref Range Status   04/06/2024 6.7 (LL) 11.7 - 15.7 g/dL Final   07/09/2021 8.8 (L) 11.7 - 15.7 g/dL Final     Creatinine   Date Value Ref Range Status   04/06/2024 2.38 (H) 0.51 - 0.95 mg/dL Final   07/09/2021 0.77 0.52 - 1.04 mg/dL Final     Urea Nitrogen   Date Value Ref Range Status   04/06/2024 35.7 (H) 8.0 - 23.0 mg/dL Final   12/29/2022 17 7 - 30 mg/dL Final   07/09/2021 13 7 - 30 mg/dL Final     Sodium   Date Value Ref Range Status   04/06/2024 136 135 - 145 mmol/L Final     Comment:     Reference intervals for this test were updated on 09/26/2023 to more accurately reflect our healthy population. There may be differences in the flagging of prior results with similar values performed with this method. Interpretation of those prior results can be made in the context of the updated reference intervals.    07/09/2021 132 (L) 133 - 144 mmol/L Final     INR   Date Value Ref Range Status   10/07/2023 1.38 (H) 0.85 - 1.15 Final   09/24/2020 1.01 0.86 - 1.14 Final       DIALYSIS PROCEDURE NOTE  Hepatitis status of previous patient on machine log was checked and verified ok to use with this patients hepatitis status.  Patient dialyzed for 2hrs 10 min. on a K3 bath with a net fluid removal of  2L.  A BFR of 300 ml/min was obtained via a CVC   The treatment plan was discussed with Dr. Ortiz during the treatment.    Total heparin received during the treatment: 0 units.   Line flushed, clamped and capped with heparin 1:1000 1.6 mL (1600 units) per lumen    Meds  given: none   Complications: Patient continuously turned onto the right side of body where the CVC is located which stopped the machine from running. Tried to get out of bed and stated that she does not want dialysis. Suggest a sitter for next run if plans are to run again on  the floor.     Person educated: patient. Knowledge base unable to assess. Barriers to learning: Confusion. Educated on procedure via verbal mode. Patient verbalized understanding.     ICEBOAT? Timeout performed pre-treatment  I: Patient was identified using 2 identifiers  C:  Consent Signed Yes  E: Equipment preventative maintenance is current and dialysis delivery system OK to use  B: Hepatitis B Surface Antigen: non-reactive; Draw Date: 4/3/24      Hepatitis B Surface Antibody: susceptible; Draw Date: 4/3/24  O: Dialysis orders present and complete prior to treatment  A: Vascular access verified and assessed prior to treatment  T: Treatment was performed at a clinically appropriate time  ?: Patient was allowed to ask questions and address concerns prior to treatment  See Adult Hemodialysis flowsheet in [employer] for further details and post assessment.  Machine water alarm in place and functioning. Transducer pods intact and checked every 15min.   Pt returned via bed.  Chlorine/Chloramine water system checked every 4 hours.  Outpatient Dialysis at [facility]    Patient repositioned every 2 hours during the treatment.  Post treatment report given to RACHEL López RN

## 2024-04-06 NOTE — PROVIDER NOTIFICATION
Provider notification    Notified person: Marcin White MD     Platform: Cued    Message: Hi Nel Bowman's hemoglobin came back 6.7 this morning, and platelets 38. We dont have any orders, can you please place a new order? -MARISSA Hanks    Provider response:

## 2024-04-06 NOTE — PROGRESS NOTES
[facility]    Medicine Progress Note - Hospitalist Service    Date of Admission:  3/29/2024  Date of Service: 04/06/2024    Assessment & Plan     Shirley Hendricks is a 63 year old female admitted on 3/29/2024 due to occlusion of right LE bypass graft.      Past medical history significant for PAD/PVD, Tobacco use D/O, CAD (history of MI x3), HTN, HLP, DM2, Neuropathy, COPD, GERD, Anemia, Spongiotic dermatitis, MDD with anxiety, Chronic anticoagulation therapy with history of DVT/PE, OA, Charcot-Breonna-Tooth foot deformity, Marginal zone B-cell lymphoma, History of SBO, History of Takotsubo CM, History of SVT.    Discussed with Dr. Mazariegos and reviewed ED notes and Vascular Surgery consult note.  Patient presented to the ED due to right leg pain that had begun ~ 1 hour prior to presentation.  She reported pain seems to worsen with walking and when she attempted to check a pulse in her leg it was absent.  She also described that the foot is colder than the left.      While in the lobby attempts to find right pedal pulse with doppler were unsuccessful.  Dr. Lozano of Vascular Surgery was contacted by the patient as well as Dr. Mazariegos.      Work-up completed while patient was in the lobby included right arterial LE US revealed the right common femoral artery to mid anterior tibial artery bypass graft occlusion.      After assessing patient labs were collected and included a CMP that revealed a sodium of 132, chloride of 97, CO2 of 21 and glucose of 103 otherwise within normal limits.  CBC with differential revealed an RDW of 15.7 otherwise unremarkable.    Right common femoral artery to mid anterior tibial artery bypass graft acute occlusion  PAD/PVD  Acute Blood Loss Anemia  *Occurred despite low-dose Xarelto and Plavix therapy.    - Vascular surgery consult requested -> urgent guillotine right above knee amputation today followed by close monitoring in the ICU.    - IR consulted and  following  --Per Vascular surgery consult Dr. Salomon was contacted and s/p right LE angiogram and lytic therapy 03/30 -> 03/31 took the patient to the angio suite to remove the sheath in the left groin with closure device.   - Hold PTA Xarelto 15 mg nightly.    - Defer resumption of PTA Plavix 75 mg/d to Vascular Surgery and/or IR -> plavix to restart once platelets improve, continue to monitor bypass in LLE   - Statin and antihypertensive management as below.    - Pain management as needed  - Follow Hgb   - Cannot perform CTA of abdomen at this time and since hemodynamically stable ->  angiogram will see if there is any active extravasation.   - transfused 1 U PRBCs 03/30 / 03/31 and 04/02 and 04/06 and closely monitor.  - CBC Q12H, transfuse as needed Hgb < 7.0    Rhabdomyolysis   ARF  Assessment: CPK has been rising. Her serum creatinine and body urea nitrogen also rising. Renal US -> No obstruction demonstrated.   Plan:  - Follow CK / BMP daily -> Cr trending down  - Nephrology following -> now on HD, Decreased BP following dilaudid for pain and 25% albumin ordered with improvement in BPs  - Avoid NSAIDs / nephrotoxins    Colon distention  Abdominal Pain  Assessment:CT abdomen 04/03 -> Medium-sized right retroperitoneal and extraperitoneal hematoma. Evaluation for extravasation is unable to be performed without contrast. This is likely similar to slightly smaller than the CT lumbar spine although this is incompletely visualized at   that time  Extensive pulmonary opacities. Differential: Multifocal infection, ARDS, and/or pulmonary  Plan:  --NG placed. Ok to use for med administration.  Would hold off on tube feeds until improving.  --Hold Anticoagulation for now given severe anemia  --GI consulted ->  consider flex sig/colonoscopy as outpatient. Okay to start TFs  --Trial fleet enema.  Miralax thru NG.  --Repeat flat AXR 4/5: Nonobstructive bowel gas pattern. Gaseous distention of the colon. Enteric feeding  tube tip in the fourth portion of the duodenum. Iliac stents, cholecystectomy clips, and endoscopic clips in the right hemiabdomen.   --Continue to monitor  --Pain control as needed    MDD with anxiety  Acute Delirium   *Noted on chart review.  No interventions.    Delirium from acute illness  IM Zyprexa as needed    Contrast dye allergy  - Ordered solumedrol and benadryl per contrast dye allergy protocol.      Mild hyponatremia  - BMP daily    Tobacco Use D/O   Patient currently smokes max 5 cigarettes per day.     - Counseled regarding smoking cessation that should also continue with PCP.    - Nicoderm patch ordered.    Recent celiac disease  *Patient reported recent GI work-up revealed she has celiac disease.    - Once diet can be advanced would request no gluten/celiac diet.      CAD (history of MI x3)  HTN  HLP  *Last coronary angiogram completed 10/2023.    - Resumed on PTA Coreg 6.25 mg BID, hold lisinopril 10 mg/d due to LIMA, continue Norvasc 2.5 mg/d.  Hold parameters in place.    - hold PTA rosuvastatin 40 mg/d due to acute rhabdo  - PRN IV hydralazine 10 mg every 4 hours for SBP GREATER THAN 180.      History of Takotsubo CM  *Recovered EF (55-60%) from ECHO 11/2023.    - Resumed on PTA Coreg 6.25 mg BID (hold for SBP < 110), hold lisinopril 10 mg/d and  hold Jardiance 10 mg/d For now given ARF    Type 2 DM  Diabetic neuropathy  *Noted diagnosis on chart review.  However, A1c of 5.2%.    - Hold PTA Jardiance 10 mg/d and gabapentin 100 mg TID due to AMS.  - No insulin as borderline hypoglycemic    COPD   - Resumed on PTA inhalers.    - Encourage use of Flutter valve/IS.      GERD  - Resumed on PTA omeprazole 20 mg/d.      Anemia  Recent GI bleed (12/2023 and admitted at [facility])  - Hold PTA Iron supplements and resume at discharge.    - CBC daily      Spongiotic dermatitis  *Recently seen at outpatient Derm.    - Resumed on PTA betamethasone cream BID.      Chronic anticoagulation therapy with history of  DVT/PE  - Hold PTA Xarelto.      OA  - Pain management as above.      Charcot-Breonna-Tooth foot deformity  *Noted on chart review.  No interventions.     Stage VALENTIN marginal zone B-cell lymphoma  *Follows with [facility] (Dr. Barrow).    - Continue outpatient surveillance (CT scan in 8/2024).      History of SBO  *Noted on chart review.  No interventions.    History of SVT  - Monitor on telemetry.    - Resumed on PTA Coreg as above.            Diet: NPO per Anesthesia Guidelines for Procedure/Surgery Except for: Meds  Adult Formula Drip Feeding: Continuous Osmolite 1.5; Nasoduodenal tube; Goal Rate: 40; mL/hr; Begin TF at 10 mL/hr and increase every 12 hours by 15 mL to goal; Do not advance tube feeding rate unless K+ is = or > 3.0, Mg++ is = or > 1.5, and Phos...    DVT Prophylaxis: Pneumatic Compression Devices  Alvarez Catheter: Not present  Lines: PRESENT      PICC 03/31/24 Double Lumen Right Brachial vein medial access-Site Assessment: WDL  CVC Double Lumen Right Internal jugular Tunneled-Site Assessment: WDL      Cardiac Monitoring: None  Code Status: Full Code      Clinically Significant Risk Factors              # Hypoalbuminemia: Lowest albumin = 2.1 g/dL at 4/4/2024  5:41 AM, will monitor as appropriate   # Thrombocytopenia: Lowest platelets = 38 in last 2 days, will monitor for bleeding   # Hypertension: Noted on problem list         # Severe Malnutrition: based on nutrition assessment    # Financial/Environmental Concerns:           Disposition Plan      Expected Discharge Date: 04/10/2024        Discharge Comments: TF, R amp, PT/OT  Neph=dilaysis  Wound Vac          Marcin White MD  Hospitalist Service  [facility]  Securely message with [employer] (more info)  Text page via [employer] Paging/Directory   ______________________________________________________________________    Interval History     No acute events overnight  Abdominal pain stable today, overall more comfortable  Tolerating  TFs  Hgb down to 6.7 but no bloody stools  No fevers  No new CP/SOB   No vomiting  No new complaints otherwise    Physical Exam   Vital Signs: Temp: 97.9  F (36.6  C) Temp src: Axillary BP: (!) 171/80 Pulse: 84   Resp: 25 SpO2: 94 % O2 Device: Nasal cannula Oxygen Delivery: 2 LPM  Weight: 115 lbs 15.39 oz      Constitutional: Awake, alert, no apparent distress.    Pulmonary: CTABL  Cardiovascular: Regular rate and rhythm, normal S1 and S2, no S3 or S4, and no murmur noted.  GI: Normal bowel sounds, soft, non-distended, non-tender.    Neuro: Fahad but has sensory loss and is not able to wiggle toes but is able to plantarflex and dorsiflex.   Psych:  Alert and oriented to person only. Confused / encephalopathic. Normal affect.  Extremities: Wound VAC intact right open AKA   ----------------------------------------------------------------------------------------    Medical Decision Making       55 MINUTES SPENT BY ME on the date of service doing chart review, history, exam, documentation & further activities per the note.      Data   ------------------------- PAST 24 HR DATA REVIEWED -----------------------------------------------    I have personally reviewed the following data over the past 24 hrs:    7.1  \   6.7 (LL)   / 38 (LL)     136 101 35.7 (H) /  126 (H)   3.7 23 2.38 (H) \       Imaging results reviewed over the past 24 hrs:   No results found for this or any previous visit (from the past 24 hour(s)).    ------------------------- ENCOUNTER LABS ----------------------------------------------------------------  Recent Labs   Lab 04/06/24  1208 04/06/24  0855 04/06/24  0653 04/05/24  0831 04/05/24  0615 04/04/24  2217 04/04/24  0805 04/04/24  0541   WBC  --   --  7.1  --  7.7  --   --  9.6   HGB  --   --  6.7*  --  7.4*  --   --  7.8*   MCV  --   --  86  --  85  --   --  82   PLT  --   --  38*  --  47*  --   --  54*   NA  --   --  136  --  135 135   < > 136   POTASSIUM  --   --  3.7  --  4.0 4.0   < > 4.1    CHLORIDE  --   --  101  --  101 101   < > 98   CO2  --   --  23  --  23 22   < > 25   BUN  --   --  35.7*  --  41.5* 35.1*   < > 55.3*   CR  --   --  2.38*  --  2.67* 2.33*   < > 3.13*   ANIONGAP  --   --  12  --  11 12   < > 13   SONIA  --   --  7.1*  --  7.3* 7.3*   < > 6.9*   * 118* 135*   < > 110* 114*   < > 103*   ALBUMIN  --   --   --   --   --   --   --  2.1*   PROTTOTAL  --   --   --   --   --   --   --  4.2*   BILITOTAL  --   --   --   --   --   --   --  0.6   ALKPHOS  --   --   --   --   --   --   --  192*   ALT  --   --   --   --   --   --   --  326*   AST  --   --   --   --   --   --   --  301*    < > = values in this interval not displayed.       Most Recent 3 CBC's:  Recent Labs   Lab Test 04/06/24  0653 04/05/24  0615 04/04/24  0541   WBC 7.1 7.7 9.6   HGB 6.7* 7.4* 7.8*   MCV 86 85 82   PLT 38* 47* 54*     Most Recent 3 BMP's:  Recent Labs   Lab Test 04/06/24  1208 04/06/24  0855 04/06/24  0653 04/05/24  0831 04/05/24  0615 04/04/24  2217   NA  --   --  136  --  135 135   POTASSIUM  --   --  3.7  --  4.0 4.0   CHLORIDE  --   --  101  --  101 101   CO2  --   --  23  --  23 22   BUN  --   --  35.7*  --  41.5* 35.1*   CR  --   --  2.38*  --  2.67* 2.33*   ANIONGAP  --   --  12  --  11 12   SONIA  --   --  7.1*  --  7.3* 7.3*   * 118* 135*   < > 110* 114*    < > = values in this interval not displayed.     Most Recent 2 LFT's:  Recent Labs   Lab Test 04/04/24  0541 10/07/23  1950   * 18   * 15   ALKPHOS 192* 35   BILITOTAL 0.6 0.2     Most Recent 3 INR's:  Recent Labs   Lab Test 10/07/23  0516 10/06/23  0551 10/05/23  1014   INR 1.38* 1.08 0.95     Most Recent 3 Troponin's:  Recent Labs   Lab Test 06/10/20  1243 02/16/20  1824 09/18/19  0739   TROPI <0.015 <0.015 <0.015     Most Recent 3 BNP's:No lab results found.  Most Recent D-dimer:  Recent Labs   Lab Test 08/13/21  0934   DD 10.38*     Most Recent Cholesterol Panel:  Recent Labs   Lab Test 10/03/23  0941   CHOL 137   LDL 69    HDL 54   TRIG 68     Most Recent 6 Bacteria Isolates From Any Culture (See EPIC Reports for Culture Details):No lab results found.  Most Recent TSH and T4:  Recent Labs   Lab Test 01/05/21  1119 02/04/19  0934 03/28/17  0922   TSH 0.77   < > 1.37   T4  --   --  1.19    < > = values in this interval not displayed.     Most Recent Urinalysis:  Recent Labs   Lab Test 02/04/19  0935   COLOR Yellow   APPEARANCE Clear   URINEGLC Negative   URINEBILI Negative   URINEKETONE Negative   SG 1.010   UBLD Negative   URINEPH 6.0   PROTEIN Negative   UROBILINOGEN 0.2   NITRITE Negative   LEUKEST Negative   RBCU O - 2   WBCU 0 - 5     Most Recent ESR & CRP:  Recent Labs   Lab Test 11/13/23  1705   SED 39*

## 2024-04-07 LAB
ANION GAP SERPL CALCULATED.3IONS-SCNC: 12 MMOL/L (ref 7–15)
BUN SERPL-MCNC: 37 MG/DL (ref 8–23)
CALCIUM SERPL-MCNC: 6.9 MG/DL (ref 8.8–10.2)
CHLORIDE SERPL-SCNC: 99 MMOL/L (ref 98–107)
CK SERPL-CCNC: 1270 U/L (ref 26–192)
CREAT SERPL-MCNC: 2.16 MG/DL (ref 0.51–0.95)
DEPRECATED HCO3 PLAS-SCNC: 23 MMOL/L (ref 22–29)
EGFRCR SERPLBLD CKD-EPI 2021: 25 ML/MIN/1.73M2
ERYTHROCYTE [DISTWIDTH] IN BLOOD BY AUTOMATED COUNT: 15.5 % (ref 10–15)
ERYTHROCYTE [DISTWIDTH] IN BLOOD BY AUTOMATED COUNT: 15.8 % (ref 10–15)
GLUCOSE BLDC GLUCOMTR-MCNC: 102 MG/DL (ref 70–99)
GLUCOSE BLDC GLUCOMTR-MCNC: 113 MG/DL (ref 70–99)
GLUCOSE BLDC GLUCOMTR-MCNC: 117 MG/DL (ref 70–99)
GLUCOSE BLDC GLUCOMTR-MCNC: 117 MG/DL (ref 70–99)
GLUCOSE SERPL-MCNC: 118 MG/DL (ref 70–99)
HCT VFR BLD AUTO: 23.9 % (ref 35–47)
HCT VFR BLD AUTO: 25.4 % (ref 35–47)
HGB BLD-MCNC: 8 G/DL (ref 11.7–15.7)
HGB BLD-MCNC: 8.8 G/DL (ref 11.7–15.7)
MCH RBC QN AUTO: 27.9 PG (ref 26.5–33)
MCH RBC QN AUTO: 29 PG (ref 26.5–33)
MCHC RBC AUTO-ENTMCNC: 33.5 G/DL (ref 31.5–36.5)
MCHC RBC AUTO-ENTMCNC: 34.6 G/DL (ref 31.5–36.5)
MCV RBC AUTO: 83 FL (ref 78–100)
MCV RBC AUTO: 84 FL (ref 78–100)
PLATELET # BLD AUTO: 38 10E3/UL (ref 150–450)
PLATELET # BLD AUTO: 38 10E3/UL (ref 150–450)
POTASSIUM SERPL-SCNC: 3.6 MMOL/L (ref 3.4–5.3)
RBC # BLD AUTO: 2.87 10E6/UL (ref 3.8–5.2)
RBC # BLD AUTO: 3.03 10E6/UL (ref 3.8–5.2)
SODIUM SERPL-SCNC: 134 MMOL/L (ref 135–145)
WBC # BLD AUTO: 6.7 10E3/UL (ref 4–11)
WBC # BLD AUTO: 6.9 10E3/UL (ref 4–11)

## 2024-04-07 PROCEDURE — 250N000013 HC RX MED GY IP 250 OP 250 PS 637: Performed by: STUDENT IN AN ORGANIZED HEALTH CARE EDUCATION/TRAINING PROGRAM

## 2024-04-07 PROCEDURE — 94640 AIRWAY INHALATION TREATMENT: CPT | Mod: 76

## 2024-04-07 PROCEDURE — 99207 PR NO BILLABLE SERVICE THIS VISIT: CPT | Performed by: INTERNAL MEDICINE

## 2024-04-07 PROCEDURE — 120N000001 HC R&B MED SURG/OB

## 2024-04-07 PROCEDURE — 82550 ASSAY OF CK (CPK): CPT | Performed by: STUDENT IN AN ORGANIZED HEALTH CARE EDUCATION/TRAINING PROGRAM

## 2024-04-07 PROCEDURE — 999N000040 HC STATISTIC CONSULT NO CHARGE VASC ACCESS

## 2024-04-07 PROCEDURE — 80048 BASIC METABOLIC PNL TOTAL CA: CPT | Performed by: STUDENT IN AN ORGANIZED HEALTH CARE EDUCATION/TRAINING PROGRAM

## 2024-04-07 PROCEDURE — 250N000011 HC RX IP 250 OP 636: Performed by: STUDENT IN AN ORGANIZED HEALTH CARE EDUCATION/TRAINING PROGRAM

## 2024-04-07 PROCEDURE — 999N000157 HC STATISTIC RCP TIME EA 10 MIN

## 2024-04-07 PROCEDURE — 99233 SBSQ HOSP IP/OBS HIGH 50: CPT | Performed by: STUDENT IN AN ORGANIZED HEALTH CARE EDUCATION/TRAINING PROGRAM

## 2024-04-07 PROCEDURE — 85027 COMPLETE CBC AUTOMATED: CPT | Performed by: STUDENT IN AN ORGANIZED HEALTH CARE EDUCATION/TRAINING PROGRAM

## 2024-04-07 PROCEDURE — 94640 AIRWAY INHALATION TREATMENT: CPT

## 2024-04-07 PROCEDURE — 250N000009 HC RX 250: Performed by: STUDENT IN AN ORGANIZED HEALTH CARE EDUCATION/TRAINING PROGRAM

## 2024-04-07 RX ORDER — HYDRALAZINE HYDROCHLORIDE 25 MG/1
25 TABLET, FILM COATED ORAL 4 TIMES DAILY PRN
Status: DISCONTINUED | OUTPATIENT
Start: 2024-04-07 | End: 2024-04-22 | Stop reason: HOSPADM

## 2024-04-07 RX ADMIN — ACETAMINOPHEN 650 MG: 325 TABLET, FILM COATED ORAL at 01:22

## 2024-04-07 RX ADMIN — HYDROMORPHONE HYDROCHLORIDE 0.2 MG: 0.2 INJECTION, SOLUTION INTRAMUSCULAR; INTRAVENOUS; SUBCUTANEOUS at 18:12

## 2024-04-07 RX ADMIN — OXYCODONE HYDROCHLORIDE 5 MG: 5 TABLET ORAL at 23:22

## 2024-04-07 RX ADMIN — FLUTICASONE FUROATE AND VILANTEROL TRIFENATATE 1 PUFF: 200; 25 POWDER RESPIRATORY (INHALATION) at 08:47

## 2024-04-07 RX ADMIN — BETAMETHASONE DIPROPIONATE: 0.5 CREAM TOPICAL at 23:11

## 2024-04-07 RX ADMIN — DOCUSATE SODIUM LIQUID 100 MG: 100 LIQUID ORAL at 08:46

## 2024-04-07 RX ADMIN — OXYCODONE HYDROCHLORIDE 5 MG: 5 TABLET ORAL at 06:25

## 2024-04-07 RX ADMIN — ONDANSETRON 4 MG: 2 INJECTION INTRAMUSCULAR; INTRAVENOUS at 08:44

## 2024-04-07 RX ADMIN — ALBUTEROL SULFATE 2.5 MG: 2.5 SOLUTION RESPIRATORY (INHALATION) at 00:11

## 2024-04-07 RX ADMIN — CARVEDILOL 6.25 MG: 6.25 TABLET, FILM COATED ORAL at 17:59

## 2024-04-07 RX ADMIN — ACETAMINOPHEN 650 MG: 325 TABLET, FILM COATED ORAL at 06:24

## 2024-04-07 RX ADMIN — AMLODIPINE BESYLATE 2.5 MG: 2.5 TABLET ORAL at 08:46

## 2024-04-07 RX ADMIN — OXYCODONE HYDROCHLORIDE 5 MG: 5 TABLET ORAL at 01:24

## 2024-04-07 RX ADMIN — NICOTINE 1 PATCH: 14 PATCH, EXTENDED RELEASE TRANSDERMAL at 08:47

## 2024-04-07 RX ADMIN — ALBUTEROL SULFATE 2.5 MG: 2.5 SOLUTION RESPIRATORY (INHALATION) at 21:30

## 2024-04-07 RX ADMIN — Medication 5 ML: at 16:53

## 2024-04-07 RX ADMIN — ALBUTEROL SULFATE 2.5 MG: 2.5 SOLUTION RESPIRATORY (INHALATION) at 07:59

## 2024-04-07 RX ADMIN — OXYCODONE HYDROCHLORIDE 5 MG: 5 TABLET ORAL at 15:10

## 2024-04-07 RX ADMIN — SENNOSIDES AND DOCUSATE SODIUM 1 TABLET: 8.6; 5 TABLET ORAL at 08:47

## 2024-04-07 RX ADMIN — ALBUTEROL SULFATE 2.5 MG: 2.5 SOLUTION RESPIRATORY (INHALATION) at 13:33

## 2024-04-07 RX ADMIN — POLYETHYLENE GLYCOL 3350 17 G: 17 POWDER, FOR SOLUTION ORAL at 08:47

## 2024-04-07 RX ADMIN — HYDROMORPHONE HYDROCHLORIDE 0.2 MG: 0.2 INJECTION, SOLUTION INTRAMUSCULAR; INTRAVENOUS; SUBCUTANEOUS at 04:40

## 2024-04-07 RX ADMIN — CARVEDILOL 6.25 MG: 6.25 TABLET, FILM COATED ORAL at 08:46

## 2024-04-07 RX ADMIN — BETAMETHASONE DIPROPIONATE: 0.5 CREAM TOPICAL at 08:47

## 2024-04-07 ASSESSMENT — ACTIVITIES OF DAILY LIVING (ADL)
ADLS_ACUITY_SCORE: 38

## 2024-04-07 NOTE — PROGRESS NOTES
Renal Medicine Chart Check      Planning dialysis 04/08/24        Recent Labs   Lab 04/06/24  0653 04/05/24  0615   POTASSIUM 3.7 4.0           LUIS M Barboza    [facility]  [phone]

## 2024-04-07 NOTE — PROGRESS NOTES
Date & Time: 2514-3780  Surgery/POD#: POD 6 Right AKA  Behavior & Aggression: Green  Fall Risk: Yes  Orientation:x4, can be forgetful   ABNL VS/O2:On room air  ABNL Labs: See chart  Pain Management:Denies   Bowel/Bladder: Incontinent of bowel  Drains: PICC, Dialysis port, PIV, ND Tube  Wounds/incisions: Right AKA with vac, Left inguinal incision  Diet:NPO, Feeding tube   Activity Level: Ax2 with lift   Tests/Procedures: NA  Anticipated  DC Date: Pending   Significant Information: Plan for dialysis tmrw, and returned to OR this coming week for more work on the AKA

## 2024-04-07 NOTE — PROGRESS NOTES
[facility]    Medicine Progress Note - Hospitalist Service    Date of Admission:  3/29/2024  Date of Service: 04/07/2024    Assessment & Plan     Shirley Hendricks is a 63 year old female admitted on 3/29/2024 due to occlusion of right LE bypass graft.      Past medical history significant for PAD/PVD, Tobacco use D/O, CAD (history of MI x3), HTN, HLP, DM2, Neuropathy, COPD, GERD, Anemia, Spongiotic dermatitis, MDD with anxiety, Chronic anticoagulation therapy with history of DVT/PE, OA, Charcot-Breonna-Tooth foot deformity, Marginal zone B-cell lymphoma, History of SBO, History of Takotsubo CM, History of SVT.    Discussed with Dr. Mazariegos and reviewed ED notes and Vascular Surgery consult note.  Patient presented to the ED due to right leg pain that had begun ~ 1 hour prior to presentation.  She reported pain seems to worsen with walking and when she attempted to check a pulse in her leg it was absent.  She also described that the foot is colder than the left.      While in the lobby attempts to find right pedal pulse with doppler were unsuccessful.  Dr. Lozano of Vascular Surgery was contacted by the patient as well as Dr. Mazariegos.      Work-up completed while patient was in the lobby included right arterial LE US revealed the right common femoral artery to mid anterior tibial artery bypass graft occlusion.      After assessing patient labs were collected and included a CMP that revealed a sodium of 132, chloride of 97, CO2 of 21 and glucose of 103 otherwise within normal limits.  CBC with differential revealed an RDW of 15.7 otherwise unremarkable.    Right common femoral artery to mid anterior tibial artery bypass graft acute occlusion  PAD/PVD  Acute Blood Loss Anemia  *Occurred despite low-dose Xarelto and Plavix therapy.    - Vascular surgery consult requested -> urgent guillotine right above knee amputation today followed by close monitoring in the ICU.    - IR consulted and  following  --Per Vascular surgery consult Dr. Salomon was contacted and s/p right LE angiogram and lytic therapy 03/30 -> 03/31 took the patient to the angio suite to remove the sheath in the left groin with closure device.   - Hold PTA Xarelto 15 mg nightly.    - Defer resumption of PTA Plavix 75 mg/d to Vascular Surgery and/or IR -> plavix to restart once platelets improve, continue to monitor bypass in LLE   - Statin and antihypertensive management as below.    - Pain management as needed  - Follow Hgb   - transfused 1 U PRBCs 03/30 / 03/31 and 04/02 and 04/06 and closely monitor.  - CBC Q12H, transfuse as needed Hgb < 7.0    Rhabdomyolysis   ARF  Assessment: CPK has been rising. Her serum creatinine and body urea nitrogen also rising. Renal US -> No obstruction demonstrated.   Plan:  - Follow CK / BMP daily -> Cr trending down  - Nephrology following -> now on HD, Decreased BP following dilaudid for pain and 25% albumin ordered with improvement in BPs  - Avoid NSAIDs / nephrotoxins    Colon distention  Abdominal Pain  Assessment:CT abdomen 04/03 -> Medium-sized right retroperitoneal and extraperitoneal hematoma. Evaluation for extravasation is unable to be performed without contrast. This is likely similar to slightly smaller than the CT lumbar spine although this is incompletely visualized at   that time  Extensive pulmonary opacities. Differential: Multifocal infection, ARDS, and/or pulmonary  Plan:  --NG placed  --Hold Anticoagulation for now given severe anemia  --GI following ->  consider flex sig/colonoscopy as outpatient. Okay to start TFs  --Trial fleet enema.  Miralax thru NG.  --Repeat flat AXR 4/5: Nonobstructive bowel gas pattern. Gaseous distention of the colon. Enteric feeding tube tip in the fourth portion of the duodenum. Iliac stents, cholecystectomy clips, and endoscopic clips in the right hemiabdomen.   --Continue to monitor  --Pain control as needed    MDD with anxiety  Acute Delirium    *Noted on chart review.  No interventions.    Delirium from acute illness  IM Zyprexa as needed    Contrast dye allergy  - Ordered solumedrol and benadryl per contrast dye allergy protocol.      Mild hyponatremia  - BMP daily    Tobacco Use D/O   Patient currently smokes max 5 cigarettes per day.     - Counseled regarding smoking cessation that should also continue with PCP.    - Nicoderm patch ordered.    Recent celiac disease  *Patient reported recent GI work-up revealed she has celiac disease.    - Once diet can be advanced would request no gluten/celiac diet.      CAD (history of MI x3)  HTN  HLP  *Last coronary angiogram completed 10/2023.    - Resumed on PTA Coreg 6.25 mg BID, hold lisinopril 10 mg/d due to LIMA, continue Norvasc 2.5 mg/d.  Hold parameters in place.    - hold PTA rosuvastatin 40 mg/d due to acute rhabdo  - PRN IV hydralazine 10 mg every 4 hours for SBP GREATER THAN 180.      History of Takotsubo CM  *Recovered EF (55-60%) from ECHO 11/2023.    - Resumed on PTA Coreg 6.25 mg BID (hold for SBP < 110), hold lisinopril 10 mg/d and  hold Jardiance 10 mg/d For now given ARF    Type 2 DM  Diabetic neuropathy  *Noted diagnosis on chart review.  However, A1c of 5.2%.    - Hold PTA Jardiance 10 mg/d and gabapentin 100 mg TID due to AMS.  - No insulin as borderline hypoglycemic    COPD   - Resumed on PTA inhalers.    - Encourage use of Flutter valve/IS.      GERD  - Resumed on PTA omeprazole 20 mg/d.      Anemia  Recent GI bleed (12/2023 and admitted at [facility])  - Hold PTA Iron supplements and resume at discharge.    - CBC daily      Spongiotic dermatitis  *Recently seen at outpatient Derm.    - Resumed on PTA betamethasone cream BID.      Chronic anticoagulation therapy with history of DVT/PE  - Hold PTA Xarelto.      OA  - Pain management as above.      Charcot-Breonna-Tooth foot deformity  *Noted on chart review.  No interventions.     Stage VALENTIN marginal zone B-cell lymphoma  *Follows with [facility] (Dr. Barrow).    - Continue outpatient surveillance (CT scan in 8/2024).      History of SBO  *Noted on chart review.  No interventions.    History of SVT  - Monitor on telemetry.    - Resumed on PTA Coreg as above.            Diet: Adult Formula Drip Feeding: Continuous Osmolite 1.5; Nasoduodenal tube; Goal Rate: 40; mL/hr; Begin TF at 10 mL/hr and increase every 12 hours by 15 mL to goal; Do not advance tube feeding rate unless K+ is = or > 3.0, Mg++ is = or > 1.5, and Phos...  NPO per Anesthesia Guidelines for Procedure/Surgery Except for: Meds, Ice Chips    DVT Prophylaxis: Pneumatic Compression Devices  Alvarez Catheter: Not present  Lines: PRESENT      PICC 03/31/24 Double Lumen Right Brachial vein medial access-Site Assessment: WDL  CVC Double Lumen Right Internal jugular Tunneled-Site Assessment: WDL      Cardiac Monitoring: None  Code Status: Full Code      Clinically Significant Risk Factors              # Hypoalbuminemia: Lowest albumin = 2.1 g/dL at 4/4/2024  5:41 AM, will monitor as appropriate   # Thrombocytopenia: Lowest platelets = 36 in last 2 days, will monitor for bleeding   # Hypertension: Noted on problem list         # Severe Malnutrition: based on nutrition assessment    # Financial/Environmental Concerns:           Disposition Plan      Expected Discharge Date: 04/10/2024        Discharge Comments: TF, R amp, PT/OT  Neph=dilaysis  Wound Vac          Marcin White MD  Hospitalist Service  [facility]  Securely message with Next Caller (more info)  Text page via [employer] Paging/Directory   ______________________________________________________________________    Interval History     No acute events overnight  Abdominal pain stable today, overall more comfortable  Tolerating TFs  Hgb responded well to transfusion  No fevers  No new CP/SOB   No vomiting  Tearful about situation and wants to go home  No new complaints otherwise    Physical Exam   Vital Signs: Temp: 97.7  F  (36.5  C) Temp src: Oral BP: 136/63 Pulse: 76   Resp: 20 SpO2: 91 % O2 Device: None (Room air) Oxygen Delivery: 2 LPM  Weight: 113 lbs 15.65 oz      Constitutional: Awake, alert, no apparent distress.    Pulmonary: CTABL  Cardiovascular: Regular rate and rhythm, normal S1 and S2, no S3 or S4, and no murmur noted.  GI: Normal bowel sounds, soft, non-distended, non-tender.    Neuro: Fahad but has sensory loss and is not able to wiggle toes but is able to plantarflex and dorsiflex.   Psych:  Alert and oriented to person only. Confused / encephalopathic. Normal affect.  Extremities: Wound VAC intact right open AKA   ----------------------------------------------------------------------------------------    Medical Decision Making       50 MINUTES SPENT BY ME on the date of service doing chart review, history, exam, documentation & further activities per the note.      Data   ------------------------- PAST 24 HR DATA REVIEWED -----------------------------------------------    I have personally reviewed the following data over the past 24 hrs:    6.9  \   8.8 (L)   / 38 (LL)     134 (L) 99 37.0 (H) /  117 (H)   3.6 23 2.16 (H) \       Imaging results reviewed over the past 24 hrs:   No results found for this or any previous visit (from the past 24 hour(s)).    ------------------------- ENCOUNTER LABS ----------------------------------------------------------------  Recent Labs   Lab 04/07/24  1140 04/07/24  1058 04/07/24  0628 04/07/24  0126 04/06/24  2158 04/06/24  1830 04/06/24  0855 04/06/24  0653 04/05/24  0831 04/05/24  0615 04/04/24  0805 04/04/24  0541   WBC  --   --  6.9  --   --  7.0  --  7.1  --  7.7  --  9.6   HGB  --   --  8.8*  --   --  8.7*  --  6.7*  --  7.4*  --  7.8*   MCV  --   --  84  --   --  84  --  86  --  85  --  82   PLT  --   --  38*  --   --  36*  --  38*  --  47*  --  54*   NA  --  134*  --   --   --   --   --  136  --  135   < > 136   POTASSIUM  --  3.6  --   --   --   --   --  3.7  --  4.0   <  > 4.1   CHLORIDE  --  99  --   --   --   --   --  101  --  101   < > 98   CO2  --  23  --   --   --   --   --  23  --  23   < > 25   BUN  --  37.0*  --   --   --   --   --  35.7*  --  41.5*   < > 55.3*   CR  --  2.16*  --   --   --   --   --  2.38*  --  2.67*   < > 3.13*   ANIONGAP  --  12  --   --   --   --   --  12  --  11   < > 13   SONIA  --  6.9*  --   --   --   --   --  7.1*  --  7.3*   < > 6.9*   * 118*  --  117*   < >  --    < > 135*   < > 110*   < > 103*   ALBUMIN  --   --   --   --   --   --   --   --   --   --   --  2.1*   PROTTOTAL  --   --   --   --   --   --   --   --   --   --   --  4.2*   BILITOTAL  --   --   --   --   --   --   --   --   --   --   --  0.6   ALKPHOS  --   --   --   --   --   --   --   --   --   --   --  192*   ALT  --   --   --   --   --   --   --   --   --   --   --  326*   AST  --   --   --   --   --   --   --   --   --   --   --  301*    < > = values in this interval not displayed.       Most Recent 3 CBC's:  Recent Labs   Lab Test 04/07/24  0628 04/06/24  1830 04/06/24  0653   WBC 6.9 7.0 7.1   HGB 8.8* 8.7* 6.7*   MCV 84 84 86   PLT 38* 36* 38*     Most Recent 3 BMP's:  Recent Labs   Lab Test 04/07/24  1140 04/07/24  1058 04/07/24  0126 04/06/24  0855 04/06/24  0653 04/05/24  0831 04/05/24  0615   NA  --  134*  --   --  136  --  135   POTASSIUM  --  3.6  --   --  3.7  --  4.0   CHLORIDE  --  99  --   --  101  --  101   CO2  --  23  --   --  23  --  23   BUN  --  37.0*  --   --  35.7*  --  41.5*   CR  --  2.16*  --   --  2.38*  --  2.67*   ANIONGAP  --  12  --   --  12  --  11   SONIA  --  6.9*  --   --  7.1*  --  7.3*   * 118* 117*   < > 135*   < > 110*    < > = values in this interval not displayed.     Most Recent 2 LFT's:  Recent Labs   Lab Test 04/04/24  0541 10/07/23  1950   * 18   * 15   ALKPHOS 192* 35   BILITOTAL 0.6 0.2     Most Recent 3 INR's:  Recent Labs   Lab Test 10/07/23  0516 10/06/23  0551 10/05/23  1014   INR 1.38* 1.08 0.95     Most  Recent 3 Troponin's:  Recent Labs   Lab Test 06/10/20  1243 02/16/20  1824 09/18/19  0739   TROPI <0.015 <0.015 <0.015     Most Recent 3 BNP's:No lab results found.  Most Recent D-dimer:  Recent Labs   Lab Test 08/13/21  0934   DD 10.38*     Most Recent Cholesterol Panel:  Recent Labs   Lab Test 10/03/23  0941   CHOL 137   LDL 69   HDL 54   TRIG 68     Most Recent 6 Bacteria Isolates From Any Culture (See EPIC Reports for Culture Details):No lab results found.  Most Recent TSH and T4:  Recent Labs   Lab Test 01/05/21  1119 02/04/19  0934 03/28/17  0922   TSH 0.77   < > 1.37   T4  --   --  1.19    < > = values in this interval not displayed.     Most Recent Urinalysis:  Recent Labs   Lab Test 02/04/19  0935   COLOR Yellow   APPEARANCE Clear   URINEGLC Negative   URINEBILI Negative   URINEKETONE Negative   SG 1.010   UBLD Negative   URINEPH 6.0   PROTEIN Negative   UROBILINOGEN 0.2   NITRITE Negative   LEUKEST Negative   RBCU O - 2   WBCU 0 - 5     Most Recent ESR & CRP:  Recent Labs   Lab Test 11/13/23  1705   SED 39*

## 2024-04-07 NOTE — PLAN OF CARE
Goal Outcome Evaluation:      Plan of Care Reviewed With: patient    Overall Patient Progress: improvingOverall Patient Progress: improving    Shift: 7p-7a  Surgery/POD#: POD 6 from a right above knee amputation & WV, excision of anteriotibial bypass graft, closure of (previous IR) left groin incision.  Behavior & Aggression: Green  Fall Risk: Yes  Orientation: A&O x4, intermittently confused/forgetful  ABNL VS/O2: VSS & weaned to RA, ex HTN  Tele: NA  ABNL Labs: Hgb 8.7, CK 1767, Cr 2.38  Pain Management: IV dilaudid x2, tylenol x3, oxy x3  Bowel/Bladder: pt is on hemodialysis, bowel sounds normoactive, passing flatus, multiple small liquid BMs  Drains: NGT w/ TF  Lines: PICC infusing D10 & 1/2 NS @ 25 ml/hr, hemodialysis port  Skin: R knee WV @ 125 continuous, R thigh bruising/blisters, redness to perineum/sacrum, L groin & R abd site CDI  Diet: NPO/NGT w/ TF @ 40 ml/hr; denies N/V  Activity Level: T/R, x2 lift  Tests/Procedures: NA  Anticipated  DC Date: pending  Significant Information:   PT/OT, WOC, Neph, & Vascular following.   Intermittently anxious/restless/frustrated overnight. Pt really wants to go home and be able to eat/drink.

## 2024-04-07 NOTE — PROGRESS NOTES
Care Management Follow Up    Length of Stay (days): 9    Expected Discharge Date: 04/10/2024     Concerns to be Addressed:       Patient plan of care discussed at interdisciplinary rounds: Yes    Anticipated Discharge Disposition:       Anticipated Discharge Services:    Anticipated Discharge DME:      Patient/family educated on Medicare website which has current facility and service quality ratings:    Education Provided on the Discharge Plan:    Patient/Family in Agreement with the Plan:      Referrals Placed by CM/SW:    Private pay costs discussed: Not applicable    Additional Information:  Writer sent referral to [facility]

## 2024-04-07 NOTE — PROGRESS NOTES
"Vascular Surgery Progress Note    S: Doing OK. Transfused yesterday with good response. Hgb stable.    O:   Vitals:  /63 (BP Location: Left arm)   Pulse 76   Temp 97.7  F (36.5  C) (Oral)   Resp 20   Ht 1.549 m (5' 1\")   Wt 51.7 kg (113 lb 15.7 oz)   LMP  (LMP Unknown)   SpO2 94%   BMI 21.54 kg/m      I/O last 3 completed shifts:  In: 2721.08 [I.V.:961.08; NG/GT:1460]  Out: 2499 [Other:2499]    Physical Exam: Alert and appropriate.    Minimal urine output  Nasogastric tube feeding      Wound VAC intact to the right AKA   Palpable graft pulse        K= 3.7   SCr= 2.38 glucose= 135   CK= 1767 (decreasing)  Hgb= 8.8 -- responded well to transfusion    Assessment/Plan: #1.  Status post emergent AKA due to graft failure but could not be resolved.  Will eventually need formal AKA this coming week.     #2.  Oliguric LIMA on hemodialysis.  Multifactorial and partially related to ischemic leg.  Note admitted 3/29/2024 SCr= 0 point 6/7 and 3/30/2024 SCr= 0.45.  Hopefully this will resolve with time.  Being followed by nephrology and appreciate their care.  Still with evidence of fluid overload due to oliguria.     #3.  Anemia.  No evidence of bleeding. HDS. Hgb responded well.    Kalpana Berg MD  Vascular Surgery Fellow    "

## 2024-04-08 ENCOUNTER — APPOINTMENT (OUTPATIENT)
Dept: PHYSICAL THERAPY | Facility: CLINIC | Age: 64
DRG: 270 | End: 2024-04-08
Payer: COMMERCIAL

## 2024-04-08 DIAGNOSIS — T82.898A: Primary | ICD-10-CM

## 2024-04-08 DIAGNOSIS — I70.229 CRITICAL LOWER LIMB ISCHEMIA (H): ICD-10-CM

## 2024-04-08 LAB
ANION GAP SERPL CALCULATED.3IONS-SCNC: 10 MMOL/L (ref 7–15)
BACTERIA TISS BX CULT: NORMAL
BLD PROD TYP BPU: NORMAL
BLD PROD TYP BPU: NORMAL
BLOOD COMPONENT TYPE: NORMAL
BLOOD COMPONENT TYPE: NORMAL
BUN SERPL-MCNC: 55.8 MG/DL (ref 8–23)
CALCIUM SERPL-MCNC: 7 MG/DL (ref 8.8–10.2)
CHLORIDE SERPL-SCNC: 93 MMOL/L (ref 98–107)
CODING SYSTEM: NORMAL
CODING SYSTEM: NORMAL
CREAT SERPL-MCNC: 2.85 MG/DL (ref 0.51–0.95)
CROSSMATCH: NORMAL
DEPRECATED HCO3 PLAS-SCNC: 24 MMOL/L (ref 22–29)
EGFRCR SERPLBLD CKD-EPI 2021: 18 ML/MIN/1.73M2
ERYTHROCYTE [DISTWIDTH] IN BLOOD BY AUTOMATED COUNT: 15.9 % (ref 10–15)
GLUCOSE BLDC GLUCOMTR-MCNC: 127 MG/DL (ref 70–99)
GLUCOSE BLDC GLUCOMTR-MCNC: 128 MG/DL (ref 70–99)
GLUCOSE BLDC GLUCOMTR-MCNC: 130 MG/DL (ref 70–99)
GLUCOSE BLDC GLUCOMTR-MCNC: 93 MG/DL (ref 70–99)
GLUCOSE SERPL-MCNC: 123 MG/DL (ref 70–99)
HCT VFR BLD AUTO: 25.1 % (ref 35–47)
HGB BLD-MCNC: 8.7 G/DL (ref 11.7–15.7)
ISSUE DATE AND TIME: NORMAL
MAGNESIUM SERPL-MCNC: 1.7 MG/DL (ref 1.7–2.3)
MCH RBC QN AUTO: 28.9 PG (ref 26.5–33)
MCHC RBC AUTO-ENTMCNC: 34.7 G/DL (ref 31.5–36.5)
MCV RBC AUTO: 83 FL (ref 78–100)
PHOSPHATE SERPL-MCNC: 1.5 MG/DL (ref 2.5–4.5)
PLATELET # BLD AUTO: 55 10E3/UL (ref 150–450)
POTASSIUM SERPL-SCNC: 4.4 MMOL/L (ref 3.4–5.3)
RBC # BLD AUTO: 3.01 10E6/UL (ref 3.8–5.2)
SODIUM SERPL-SCNC: 127 MMOL/L (ref 135–145)
UNIT ABO/RH: NORMAL
UNIT ABO/RH: NORMAL
UNIT NUMBER: NORMAL
UNIT NUMBER: NORMAL
UNIT STATUS: NORMAL
UNIT STATUS: NORMAL
UNIT TYPE ISBT: 6200
UNIT TYPE ISBT: 6200
WBC # BLD AUTO: 6.5 10E3/UL (ref 4–11)

## 2024-04-08 PROCEDURE — 258N000001 HC RX 258: Performed by: STUDENT IN AN ORGANIZED HEALTH CARE EDUCATION/TRAINING PROGRAM

## 2024-04-08 PROCEDURE — 90937 HEMODIALYSIS REPEATED EVAL: CPT

## 2024-04-08 PROCEDURE — 99233 SBSQ HOSP IP/OBS HIGH 50: CPT | Performed by: STUDENT IN AN ORGANIZED HEALTH CARE EDUCATION/TRAINING PROGRAM

## 2024-04-08 PROCEDURE — 99232 SBSQ HOSP IP/OBS MODERATE 35: CPT | Performed by: INTERNAL MEDICINE

## 2024-04-08 PROCEDURE — 97530 THERAPEUTIC ACTIVITIES: CPT | Mod: GP | Performed by: PHYSICAL THERAPIST

## 2024-04-08 PROCEDURE — 99231 SBSQ HOSP IP/OBS SF/LOW 25: CPT | Mod: 24

## 2024-04-08 PROCEDURE — 83735 ASSAY OF MAGNESIUM: CPT | Performed by: STUDENT IN AN ORGANIZED HEALTH CARE EDUCATION/TRAINING PROGRAM

## 2024-04-08 PROCEDURE — 258N000001 HC RX 258: Performed by: INTERNAL MEDICINE

## 2024-04-08 PROCEDURE — 85027 COMPLETE CBC AUTOMATED: CPT | Performed by: STUDENT IN AN ORGANIZED HEALTH CARE EDUCATION/TRAINING PROGRAM

## 2024-04-08 PROCEDURE — 80048 BASIC METABOLIC PNL TOTAL CA: CPT | Performed by: STUDENT IN AN ORGANIZED HEALTH CARE EDUCATION/TRAINING PROGRAM

## 2024-04-08 PROCEDURE — 250N000013 HC RX MED GY IP 250 OP 250 PS 637: Performed by: STUDENT IN AN ORGANIZED HEALTH CARE EDUCATION/TRAINING PROGRAM

## 2024-04-08 PROCEDURE — 250N000011 HC RX IP 250 OP 636: Performed by: STUDENT IN AN ORGANIZED HEALTH CARE EDUCATION/TRAINING PROGRAM

## 2024-04-08 PROCEDURE — 250N000009 HC RX 250: Performed by: INTERNAL MEDICINE

## 2024-04-08 PROCEDURE — 84100 ASSAY OF PHOSPHORUS: CPT | Performed by: STUDENT IN AN ORGANIZED HEALTH CARE EDUCATION/TRAINING PROGRAM

## 2024-04-08 PROCEDURE — 250N000009 HC RX 250: Performed by: STUDENT IN AN ORGANIZED HEALTH CARE EDUCATION/TRAINING PROGRAM

## 2024-04-08 PROCEDURE — 258N000003 HC RX IP 258 OP 636: Performed by: STUDENT IN AN ORGANIZED HEALTH CARE EDUCATION/TRAINING PROGRAM

## 2024-04-08 PROCEDURE — 99231 SBSQ HOSP IP/OBS SF/LOW 25: CPT | Mod: 24 | Performed by: SURGERY

## 2024-04-08 PROCEDURE — 999N000198 HC STATISTIC WOC PT EDUCATION, 16-30 MIN

## 2024-04-08 PROCEDURE — 120N000001 HC R&B MED SURG/OB

## 2024-04-08 RX ADMIN — VASOPRESSIN: 20 INJECTION, SOLUTION INTRAVENOUS at 02:38

## 2024-04-08 RX ADMIN — ONDANSETRON 4 MG: 4 TABLET, ORALLY DISINTEGRATING ORAL at 06:29

## 2024-04-08 RX ADMIN — ALBUTEROL SULFATE 2.5 MG: 2.5 SOLUTION RESPIRATORY (INHALATION) at 02:01

## 2024-04-08 RX ADMIN — AMLODIPINE BESYLATE 2.5 MG: 2.5 TABLET ORAL at 11:47

## 2024-04-08 RX ADMIN — SODIUM PHOSPHATE, MONOBASIC, MONOHYDRATE AND SODIUM PHOSPHATE, DIBASIC, ANHYDROUS 9 MMOL: 142; 276 INJECTION, SOLUTION INTRAVENOUS at 18:58

## 2024-04-08 RX ADMIN — CARVEDILOL 6.25 MG: 6.25 TABLET, FILM COATED ORAL at 11:46

## 2024-04-08 RX ADMIN — OXYCODONE HYDROCHLORIDE 5 MG: 5 TABLET ORAL at 13:33

## 2024-04-08 RX ADMIN — ONDANSETRON 4 MG: 4 TABLET, ORALLY DISINTEGRATING ORAL at 17:12

## 2024-04-08 RX ADMIN — OXYCODONE HYDROCHLORIDE 5 MG: 5 TABLET ORAL at 02:25

## 2024-04-08 RX ADMIN — FAMOTIDINE 20 MG: 10 INJECTION, SOLUTION INTRAVENOUS at 06:32

## 2024-04-08 RX ADMIN — OXYCODONE HYDROCHLORIDE 5 MG: 5 TABLET ORAL at 06:43

## 2024-04-08 RX ADMIN — POTASSIUM & SODIUM PHOSPHATES POWDER PACK 280-160-250 MG 1 PACKET: 280-160-250 PACK at 18:08

## 2024-04-08 RX ADMIN — CARVEDILOL 6.25 MG: 6.25 TABLET, FILM COATED ORAL at 17:12

## 2024-04-08 RX ADMIN — Medication 5 ML: at 17:12

## 2024-04-08 RX ADMIN — NICOTINE 1 PATCH: 14 PATCH, EXTENDED RELEASE TRANSDERMAL at 11:48

## 2024-04-08 RX ADMIN — ONDANSETRON 4 MG: 4 TABLET, ORALLY DISINTEGRATING ORAL at 23:38

## 2024-04-08 ASSESSMENT — ACTIVITIES OF DAILY LIVING (ADL)
ADLS_ACUITY_SCORE: 38

## 2024-04-08 NOTE — PROGRESS NOTES
Renal Medicine Progress Note            Assessment/Plan:       # Patient with a creatinine of 0.45-0.9 ml/dl at baseline.     # Severe LIMA: Likely BAILEY +/- rhabdo +/- ischemic injury   -dialysis dependent    # Ischemic RLE due to occluded bypass graft   -AKA due to unsalvagable limb 04/01/24    # Anemia and thrombocytopenia:  Hgb seems stable.     # FEN: trace edema. K and bciarb are okay. Hypophosphatemia. Corrected calcium 8.6       Plan:  # 2 hrs HD, UF ~ 1.8 liters net, Na 138 and 3K. Next HD treatment is on Wednesday.  # strict I/O  # Monitor for kidney function recovery        Interval History:     Afebrile. VSS.  She received 2 hrs of HD with UF ~ 1.8 liters net.   System clotted half an hour left.   She denies SOB          Medications and Allergies:     Current Facility-Administered Medications   Medication Dose Route Frequency Provider Last Rate Last Admin    - MEDICATION INSTRUCTIONS for Dialysis Patients -   Does not apply See Admin Instructions Marcin White MD        albuterol (PROVENTIL) neb solution 2.5 mg  2.5 mg Nebulization Q6H Marcin White MD   2.5 mg at 04/08/24 0201    amLODIPine (NORVASC) tablet 2.5 mg  2.5 mg Oral Daily Marcin White MD   2.5 mg at 04/07/24 0846    B and C vitamin Complex with folic acid (NEPHRONEX) liquid 5 mL  5 mL Per Feeding Tube Daily Marcin White MD   5 mL at 04/07/24 1653    betamethasone dipropionate (DIPROSONE) 0.05 % cream   Topical BID Marcin White MD   Given at 04/07/24 2311    carvedilol (COREG) tablet 6.25 mg  6.25 mg Oral BID w/meals Marcin White MD   6.25 mg at 04/07/24 1759    Contraindications to both pharmacological and mechanical prophylaxis (must document contraindications for both in this order)   Does not apply See Admin Instructions Marcin White MD        docusate (COLACE) 50 MG/5ML liquid 100 mg  100 mg Oral or Feeding Tube BID Marcin White MD   100 mg at 04/07/24 0846    famotidine (PEPCID) injection 20 mg  20 mg Intravenous Q48H Marcin White MD   20 mg  at 04/08/24 0632    fluticasone-vilanterol (BREO ELLIPTA) 200-25 MCG/ACT inhaler 1 puff  1 puff Inhalation Daily Marcin White MD   1 puff at 04/07/24 0847    sodium chloride 0.9% DIALYSIS Cath LOCK - RED Lumen  10 mL Intracatheter Once in dialysis/CRRT ELYSSA Barboza MD        Followed by    heparin 1000 unit/mL DIALYSIS Cath LOCK - RED Lumen  1.3-2.6 mL Intracatheter Once in dialysis/CRRT ELYSSA Barboza MD        sodium chloride 0.9% DIALYSIS Cath LOCK - BLUE Lumen  10 mL Intracatheter Once in dialysis/CRRT ELYSSA Barboza MD        Followed by    heparin 1000 unit/mL DIALYSIS Cath LOCK -BLUE Lumen  1.3-2.6 mL Intracatheter Once in dialysis/CRRT ELYSSA Barboza MD        insulin aspart (NovoLOG) injection (RAPID ACTING)  1-7 Units Subcutaneous TID AC Marcin White MD        insulin aspart (NovoLOG) injection (RAPID ACTING)  1-5 Units Subcutaneous At Bedtime Marcin White MD        nicotine (NICODERM CQ) 14 MG/24HR 24 hr patch 1 patch  1 patch Transdermal Daily Marcin White MD   1 patch at 04/07/24 0847    No heparin via hemodialysis machine   Does not apply Once ELYSSA Barboza MD        polyethylene glycol (MIRALAX) Packet 17 g  17 g Oral Daily Marcin White MD   17 g at 04/07/24 0847    senna-docusate (SENOKOT-S/PERICOLACE) 8.6-50 MG per tablet 1 tablet  1 tablet Oral or Feeding Tube BID Marcin White MD   1 tablet at 04/07/24 0847    sodium chloride (PF) 0.9% PF flush 3 mL  3 mL Intracatheter Q8H Marcin White MD   3 mL at 04/05/24 0609    sodium chloride 0.9% BOLUS 250 mL  250 mL Intravenous Once in dialysis/CRRT ELYSSA Barboza MD        sodium chloride 0.9% BOLUS 300 mL  300 mL Hemodialysis Machine Once ELYSSA Barboza MD            Allergies   Allergen Reactions    Pantoprazole      Protonix caused diffuse edema    Chantix [Varenicline]      Terrible dreams    Contrast Dye Swelling     Patient reports facial and throat swelling with prior CT contrast.    Penicillins Itching and Rash          "   Physical Exam:   Vitals were reviewed   , Blood pressure 123/59, pulse 80, temperature 98.9  F (37.2  C), temperature source Oral, resp. rate 18, height 1.549 m (5' 1\"), weight 51.7 kg (113 lb 15.7 oz), SpO2 93%, not currently breastfeeding.    Wt Readings from Last 3 Encounters:   04/07/24 51.7 kg (113 lb 15.7 oz)   01/08/24 49.8 kg (109 lb 12.8 oz)   12/12/23 50.8 kg (112 lb)       Intake/Output Summary (Last 24 hours) at 4/8/2024 1100  Last data filed at 4/7/2024 2200  Gross per 24 hour   Intake 215 ml   Output --   Net 215 ml       GENERAL APPEARANCE: Frail.   HEENT:  Eyes/ears/nose/neck grossly normal  RESP: lungs cta b c good efforts, no crackles, rhonchi or wheezes  CV: RRR  ABDOMEN: Soft, NT  EXTREMITIES/SKIN: LLE with trace edema. R AKA.   NEURO: Awake, alert and conversing normally  R TDC CDI         Data:     CBC RESULTS:     Recent Labs   Lab 04/08/24  0631 04/07/24  2058 04/07/24  0628 04/06/24  1830 04/06/24  0653 04/05/24  0615   WBC 6.5 6.7 6.9 7.0 7.1 7.7   RBC 3.01* 2.87* 3.03* 2.98* 2.36* 2.60*   HGB 8.7* 8.0* 8.8* 8.7* 6.7* 7.4*   HCT 25.1* 23.9* 25.4* 25.1* 20.3* 22.1*   PLT 55* 38* 38* 36* 38* 47*       Basic Metabolic Panel:  Recent Labs   Lab 04/08/24  0631 04/08/24  0616 04/07/24  2132 04/07/24  1741 04/07/24  1140 04/07/24  1058 04/06/24  0855 04/06/24  0653 04/05/24  0831 04/05/24  0615 04/04/24  2217 04/04/24  1252 04/04/24  1249   *  --   --   --   --  134*  --  136  --  135 135  --  136   POTASSIUM 4.4  --   --   --   --  3.6  --  3.7  --  4.0 4.0  --  3.8   CHLORIDE 93*  --   --   --   --  99  --  101  --  101 101  --  100   CO2 24  --   --   --   --  23  --  23  --  23 22  --  23   BUN 55.8*  --   --   --   --  37.0*  --  35.7*  --  41.5* 35.1*  --  24.8*   CR 2.85*  --   --   --   --  2.16*  --  2.38*  --  2.67* 2.33*  --  1.67*   * 127* 102* 113* 117* 118*   < > 135*   < > 110* 114*   < > 83   SONIA 7.0*  --   --   --   --  6.9*  --  7.1*  --  7.3* 7.3*  --  7.6*    < " > = values in this interval not displayed.       INRNo lab results found in last 7 days.   Attestation:   I have reviewed today's relevant vital signs, notes, medications, labs and imaging.    Torrey Alegria MD  [facility] - Nephrology  Office phone :[phone]  Pager: [phone]

## 2024-04-08 NOTE — PLAN OF CARE
Shift: [id number]  Surgery/POD#: POD 7 from a right above knee amputation & WV, excision of anteriotibial bypass graft, closure of (previous IR) left groin incision.  Behavior & Aggression: Green  Fall Risk: Yes  Orientation: A&O x4,   ABNL VS/O2: VSS & weaned to RA, ex HTN  ABNL Labs: Hgb 8.0   Pain Management: oxy x 1  Bowel/Bladder: on HD, bowel sounds normoactive, passing flatus, small liquid BM  Drains: NGT w/ TF  Lines: PICC infusing D10 & 1/2 NS @ 15 ml/hr, hemodialysis port  Skin: R knee WV @ 125 continuous, R thigh bruising/blisters, redness to perineum/sacrum, L groin & R abd site CDI  Diet: NPO/NGT w/ TF @ 40 ml/hr; reported N/V, Zofran given x1. Taking Med Orally and ice chips.  Activity Level: T/R, x2 lift  Tests/Procedures: NA  Anticipated  DC Date: pending  Significant Information:   PT/OT, WOC, Neph, & Vascular following.   Intermittently crying anxious/restless/frustrated overnight. . PICC line on Right brachial vein. CVC double lumen on Right internal jugular. Pain management.

## 2024-04-08 NOTE — PROGRESS NOTES
"VASCULAR SURGERY PROGRESS NOTE    Subjective:  Resting comfortably in bed, no acute events overnight. Tolerating HD. Understandably anxious and frustrated about clinical course. Required 1 unit of PRBCs over the weekend.     Objective:  Intake/Output Summary (Last 24 hours) at 4/8/2024 1048  Last data filed at 4/7/2024 2200  Gross per 24 hour   Intake 215 ml   Output --   Net 215 ml     PHYSICAL EXAM:  /59   Pulse 80   Temp 98.9  F (37.2  C) (Oral)   Resp 18   Ht 1.549 m (5' 1\")   Wt 51.7 kg (113 lb 15.7 oz)   LMP  (LMP Unknown)   SpO2 93%   BMI 21.54 kg/m    Alert and oriented x4, neurologically intact  On HD, oliguric   No further extension of bruising or discoloration to the right leg  1+ generalized edema noted    HGB 8.7 Cr 2.85       ASSESSMENT:  Shirley Hendricks is a 63 year old female who underwent an 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 Hernandez  -on hemodialysis, appreciate nephrology's input. Hopefully LIMA will resolve with time, avoid nephrotoxic medications  -hemoglobin stable this am, continue to manage  -appreciate excellent nursing cares  - support/social work involved    Discussed pt history, exam, assessment and plan with Dr. Hernandez of the vascular surgery service, who is in agreement with the above.    Kaylee Liu NP  VASCULAR SURGERY                  "

## 2024-04-08 NOTE — PROVIDER NOTIFICATION
"Gerard paged Dr. Law \"/72, do you want PRN >180?\"    Hydralazine 25mg tablet 4x/day PRN for SBP >180  "

## 2024-04-08 NOTE — PROGRESS NOTES
Care Management Follow Up    Length of Stay (days): 10    Expected Discharge Date: 04/10/2024     Concerns to be Addressed:       Patient plan of care discussed at interdisciplinary rounds: Yes    Anticipated Discharge Disposition: Transitional Care, Skilled Nursing Facility     Anticipated Discharge Services:    Anticipated Discharge DME:      Patient/family educated on Medicare website which has current facility and service quality ratings:    Education Provided on the Discharge Plan:    Patient/Family in Agreement with the Plan: yes    Referrals Placed by CM/SW:    Private pay costs discussed: Not applicable    Additional Information:  Writer met with family at bedside to discuss discharge planning and care coordination. PT is recommending TCU and writer left a list of choices with family. Patient has new dialysis, which may factor in to choices. Family wanted to discuss patients spouse. Spouse is blind and depends on patient for his care. Writer provided empathetic listening and resources for family to call. Family is considering Assisted Living facilities and can afford them. Patient would not qualify for Medical assistance. Writer provided private pay home care resources. Family would like either [facility] or [facility] in [place]. SW following for safe discharge planning.       DAWN Herrera

## 2024-04-08 NOTE — CONSULTS
"[facility]  WO Nurse Inpatient Assessment     Consulted for: \"Change NPWT dressings\", Right AKA\"    Summary: Writer performed thorough chart review. Noted that patient has tentative plan for surgery to complete AKA tomorrow, 4/9. Spoke with Kaylee Liu NP with Vascular Surgery who confirmed that patient will be going to OR tomorrow at 10:20 AM. Kaylee told writer that wound vac does not need to be changed today. Will complete consult and [facility] nurse will sign of for now. Please reach out for other wound care needs.     Sheila Javier RN, CWOCN  Please contact via [employer] at name or group \"[facility] nurse\"- M-F [id number]  Leave  @ [phone] for non-urgent needs. Checked occasionally M-F.   "

## 2024-04-08 NOTE — PROGRESS NOTES
Pt A&O x4, up x2 with lift. Up in chair x1. VSS ex BP slightly high. RA. Advance to renal diet. Zofran x1 for nausea. NG tube feeding discontinued at ~ 1600. Poor oral intake. Push fluids. Denied pain when resting. R PICC and hemodialysis port patent. D10 1/2 NS infusing.  R thigh bruises, AKA wound vac @ 125 continuous, intact. P replaced x1. BM x2. Went to dialysis this morning. Will have AKA surgery tomorrow. NPO after midnight.

## 2024-04-08 NOTE — PROGRESS NOTES
Potassium   Date Value Ref Range Status   04/08/2024 4.4 3.4 - 5.3 mmol/L Final   12/29/2022 4.3 3.4 - 5.3 mmol/L Final   07/09/2021 5.2 3.4 - 5.3 mmol/L Final     Hemoglobin   Date Value Ref Range Status   04/08/2024 8.7 (L) 11.7 - 15.7 g/dL Final   07/09/2021 8.8 (L) 11.7 - 15.7 g/dL Final     Creatinine   Date Value Ref Range Status   04/08/2024 2.85 (H) 0.51 - 0.95 mg/dL Final   07/09/2021 0.77 0.52 - 1.04 mg/dL Final     Urea Nitrogen   Date Value Ref Range Status   04/08/2024 55.8 (H) 8.0 - 23.0 mg/dL Final   12/29/2022 17 7 - 30 mg/dL Final   07/09/2021 13 7 - 30 mg/dL Final     Sodium   Date Value Ref Range Status   04/08/2024 127 (L) 135 - 145 mmol/L Final     Comment:     Reference intervals for this test were updated on 09/26/2023 to more accurately reflect our healthy population. There may be differences in the flagging of prior results with similar values performed with this method. Interpretation of those prior results can be made in the context of the updated reference intervals.    07/09/2021 132 (L) 133 - 144 mmol/L Final     INR   Date Value Ref Range Status   10/07/2023 1.38 (H) 0.85 - 1.15 Final   09/24/2020 1.01 0.86 - 1.14 Final       DIALYSIS PROCEDURE NOTE  Hepatitis status of previous patient on machine log was checked and verified ok to use with this patients hepatitis status.  Patient dialyzed for 2 hrs. on a K3 bath with a net fluid removal of  1.8L.  A BFR of 400 ml/min was obtained via a CVC.  The treatment plan was discussed with Dr. Alegria during the treatment.    Total heparin received during the treatment: 0 units.   Line flushed, clamped and capped with heparin 1:1000 1.6 mL (1600 units) per lumen    Meds  given: none   Complications: Lines clotted with 30 minutes left. Treatment terminated. Half of blood rinsed back. Approximate blood loss 100 ml      Person educated: patient. Knowledge base minimal. Barriers to learning: confusion. Educated on procedure via verbal mode. Patient  verbalized understanding.     ICEBOAT? Timeout performed pre-treatment  I: Patient was identified using 2 identifiers  C:  Consent Signed Yes  E: Equipment preventative maintenance is current and dialysis delivery system OK to use  B: Hepatitis B Surface     O: Dialysis orders present and complete prior to treatment  A: Vascular access verified and assessed prior to treatment  T: Treatment was performed at a clinically appropriate time  ?: Patient was allowed to ask questions and address concerns prior to treatment  See Adult Hemodialysis flowsheet in [facility] for further details and post assessment.  Machine water alarm in place and functioning. Transducer pods intact and checked every 15min.   Pt returned via bed.  Chlorine/Chloramine water system checked every 4 hours.  Outpatient Dialysis not established    Patient repositioned every 2 hours during the treatment.  Post treatment report given to ALONDRA Rogers RN

## 2024-04-08 NOTE — PROGRESS NOTES
[facility]    Medicine Progress Note - Hospitalist Service    Date of Admission:  3/29/2024  Date of Service: 04/08/2024    Assessment & Plan     Shirley Hendricks is a 63 year old female admitted on 3/29/2024 due to occlusion of right LE bypass graft.      Past medical history significant for PAD/PVD, Tobacco use D/O, CAD (history of MI x3), HTN, HLP, DM2, Neuropathy, COPD, GERD, Anemia, Spongiotic dermatitis, MDD with anxiety, Chronic anticoagulation therapy with history of DVT/PE, OA, Charcot-Breonna-Tooth foot deformity, Marginal zone B-cell lymphoma, History of SBO, History of Takotsubo CM, History of SVT.    Discussed with Dr. Mazariegos and reviewed ED notes and Vascular Surgery consult note.  Patient presented to the ED due to right leg pain that had begun ~ 1 hour prior to presentation.  She reported pain seems to worsen with walking and when she attempted to check a pulse in her leg it was absent.  She also described that the foot is colder than the left.      While in the lobby attempts to find right pedal pulse with doppler were unsuccessful.  Dr. Lozano of Vascular Surgery was contacted by the patient as well as Dr. Mazariegos.      Work-up completed while patient was in the lobby included right arterial LE US revealed the right common femoral artery to mid anterior tibial artery bypass graft occlusion.      After assessing patient labs were collected and included a CMP that revealed a sodium of 132, chloride of 97, CO2 of 21 and glucose of 103 otherwise within normal limits.  CBC with differential revealed an RDW of 15.7 otherwise unremarkable.    Right common femoral artery to mid anterior tibial artery bypass graft acute occlusion  PAD/PVD  Acute Blood Loss Anemia  *Occurred despite low-dose Xarelto and Plavix therapy.    - Vascular surgery consult requested -> urgent guillotine right above knee amputation today followed by close monitoring in the ICU.    - Tentative plan to  return to OR for formalization of AKA on 4/9 with Dr. Diaz   - IR consulted and following  --Per Vascular surgery consult Dr. Salomon was contacted and s/p right LE angiogram and lytic therapy 03/30 -> 03/31 took the patient to the angio suite to remove the sheath in the left groin with closure device.   - Hold PTA Xarelto 15 mg nightly.    - Defer resumption of PTA Plavix 75 mg/d to Vascular Surgery and/or IR -> plavix to restart once platelets improve, continue to monitor bypass in LLE   - Statin and antihypertensive management as below.    - Pain management as needed  - Follow Hgb   - transfused 1 U PRBCs 03/30 / 03/31 and 04/02 and 04/06 and closely monitor.  - CBC Q12H, transfuse as needed Hgb < 7.0    Rhabdomyolysis   ARF  Assessment: CPK has been rising. Her serum creatinine and body urea nitrogen also rising. Renal US -> No obstruction demonstrated.   Plan:  - Follow CK / BMP daily -> Cr trending down  - Nephrology following -> now on HD, Decreased BP following dilaudid for pain and 25% albumin ordered with improvement in BPs  - Avoid NSAIDs / nephrotoxins    Colon distention  Abdominal Pain  Assessment:CT abdomen 04/03 -> Medium-sized right retroperitoneal and extraperitoneal hematoma. Evaluation for extravasation is unable to be performed without contrast. This is likely similar to slightly smaller than the CT lumbar spine although this is incompletely visualized at   that time  Extensive pulmonary opacities. Differential: Multifocal infection, ARDS, and/or pulmonary  Plan:  --NG placed -> remove in coming days as GI issues resolve  --Can stop TFs -> Dialysis diet  --Hold Anticoagulation for now given anemia  --GI following ->  consider flex sig/colonoscopy as outpatient.  --Repeat flat AXR 4/5: Nonobstructive bowel gas pattern. Gaseous distention of the colon. Enteric feeding tube tip in the fourth portion of the duodenum. Iliac stents, cholecystectomy clips, and endoscopic clips in the right  hemiabdomen.   --Continue to monitor  --Pain control as needed    MDD with anxiety  Acute Delirium   *Noted on chart review.  No interventions.    Delirium from acute illness  Plan:  Delirium has mostly resolved  Continue to maintain delirium precautions.   IM Zyprexa as needed    Contrast dye allergy  - Ordered solumedrol and benadryl per contrast dye allergy protocol.      Mild hyponatremia  - BMP daily    Tobacco Use D/O   Patient currently smokes max 5 cigarettes per day.     - Counseled regarding smoking cessation that should also continue with PCP.    - Nicoderm patch ordered.    Recent celiac disease  *Patient reported recent GI work-up revealed she has celiac disease.    - Once diet can be advanced would request no gluten/celiac diet.      CAD (history of MI x3)  HTN  HLP  *Last coronary angiogram completed 10/2023.    - Resumed on PTA Coreg 6.25 mg BID, hold lisinopril 10 mg/d due to LIMA, continue Norvasc 2.5 mg/d.  Hold parameters in place.    - hold PTA rosuvastatin 40 mg/d due to acute rhabdo  - PRN IV hydralazine 10 mg every 4 hours for SBP GREATER THAN 180.      History of Takotsubo CM  *Recovered EF (55-60%) from ECHO 11/2023.    - Resumed on PTA Coreg 6.25 mg BID (hold for SBP < 110), hold lisinopril 10 mg/d and  hold Jardiance 10 mg/d For now given ARF    Type 2 DM  Diabetic neuropathy  *Noted diagnosis on chart review.  However, A1c of 5.2%.    - Hold PTA Jardiance 10 mg/d and gabapentin 100 mg TID due to AMS.  - No insulin as borderline hypoglycemic    COPD   - Resumed on PTA inhalers.    - Encourage use of Flutter valve/IS.      GERD  - Resumed on PTA omeprazole 20 mg/d.      Anemia  Recent GI bleed (12/2023 and admitted at [facility])  - Hold PTA Iron supplements and resume at discharge.    - CBC daily      Spongiotic dermatitis  *Recently seen at outpatient Derm.    - Resumed on PTA betamethasone cream BID.      Chronic anticoagulation therapy with history of DVT/PE  - Hold PTA Xarelto.       OA  - Pain management as above.      Charcot-Breonna-Tooth foot deformity  *Noted on chart review.  No interventions.     Stage VALENTIN marginal zone B-cell lymphoma  *Follows with [facility] (Dr. Barrow).    - Continue outpatient surveillance (CT scan in 8/2024).      History of SBO  *Noted on chart review.  No interventions.    History of SVT  - Resumed on PTA Coreg as above.            Diet: Adult Formula Drip Feeding: Continuous Osmolite 1.5; Nasoduodenal tube; Goal Rate: 40; mL/hr; Begin TF at 10 mL/hr and increase every 12 hours by 15 mL to goal; Do not advance tube feeding rate unless K+ is = or > 3.0, Mg++ is = or > 1.5, and Phos...  Renal Diet (dialysis)    DVT Prophylaxis: Pneumatic Compression Devices  Alvarez Catheter: Not present  Lines: PRESENT      PICC 03/31/24 Double Lumen Right Brachial vein medial access-Site Assessment: WDL  CVC Double Lumen Right Internal jugular Tunneled-Site Assessment: WDL      Cardiac Monitoring: None  Code Status: Full Code      Clinically Significant Risk Factors         # Hyponatremia: Lowest Na = 127 mmol/L in last 2 days, will monitor as appropriate      # Hypoalbuminemia: Lowest albumin = 2.1 g/dL at 4/4/2024  5:41 AM, will monitor as appropriate   # Thrombocytopenia: Lowest platelets = 36 in last 2 days, will monitor for bleeding   # Hypertension: Noted on problem list         # Severe Malnutrition: based on nutrition assessment    # Financial/Environmental Concerns:           Disposition Plan      Expected Discharge Date: 04/10/2024        Discharge Comments: TF, R amp, PT/OT  Neph=dilaysis  Wound Vac          Marcin White MD  Hospitalist Service  [facility]  Securely message with [employer] (more info)  Text page via [employer] Paging/Directory   ______________________________________________________________________    Interval History     No acute events overnight  Abdominal pain improving  Tolerating TFs  Hgb responded well to transfusion  No  fevers  No new CP/SOB   No vomiting  No new complaints otherwise  Family at bedside and questions answered.     Physical Exam   Vital Signs: Temp: 98.3  F (36.8  C) Temp src: Oral BP: (!) 158/79 Pulse: 92   Resp: 22 SpO2: 93 % O2 Device: None (Room air)    Weight: 113 lbs 15.65 oz      Constitutional: Awake, alert, no apparent distress.    Pulmonary: CTABL  Cardiovascular: Regular rate and rhythm, normal S1 and S2, no S3 or S4, and no murmur noted.  GI: Normal bowel sounds, soft, non-distended, non-tender.    Neuro: Fahad but has sensory loss and is not able to wiggle toes but is able to plantarflex and dorsiflex.   Psych:  Alert and oriented x3. Fahad. Normal affect.  Extremities: Wound VAC intact right open AKA   ----------------------------------------------------------------------------------------    Medical Decision Making       55 MINUTES SPENT BY ME on the date of service doing chart review, history, exam, documentation & further activities per the note.      Data   ------------------------- PAST 24 HR DATA REVIEWED -----------------------------------------------    I have personally reviewed the following data over the past 24 hrs:    6.5  \   8.7 (L)   / 55 (L)     127 (L) 93 (L) 55.8 (H) /  128 (H)   4.4 24 2.85 (H) \       Imaging results reviewed over the past 24 hrs:   No results found for this or any previous visit (from the past 24 hour(s)).    ------------------------- ENCOUNTER LABS ----------------------------------------------------------------  Recent Labs   Lab 04/08/24  1200 04/08/24  0631 04/08/24  0616 04/07/24  2132 04/07/24  2058 04/07/24  1140 04/07/24  1058 04/07/24  0628 04/06/24  0855 04/06/24  0653 04/04/24  0805 04/04/24  0541   WBC  --  6.5  --   --  6.7  --   --  6.9   < > 7.1   < > 9.6   HGB  --  8.7*  --   --  8.0*  --   --  8.8*   < > 6.7*   < > 7.8*   MCV  --  83  --   --  83  --   --  84   < > 86   < > 82   PLT  --  55*  --   --  38*  --   --  38*   < > 38*   < > 54*   NA  --   127*  --   --   --   --  134*  --   --  136   < > 136   POTASSIUM  --  4.4  --   --   --   --  3.6  --   --  3.7   < > 4.1   CHLORIDE  --  93*  --   --   --   --  99  --   --  101   < > 98   CO2  --  24  --   --   --   --  23  --   --  23   < > 25   BUN  --  55.8*  --   --   --   --  37.0*  --   --  35.7*   < > 55.3*   CR  --  2.85*  --   --   --   --  2.16*  --   --  2.38*   < > 3.13*   ANIONGAP  --  10  --   --   --   --  12  --   --  12   < > 13   SONIA  --  7.0*  --   --   --   --  6.9*  --   --  7.1*   < > 6.9*   * 123* 127*   < >  --    < > 118*  --    < > 135*   < > 103*   ALBUMIN  --   --   --   --   --   --   --   --   --   --   --  2.1*   PROTTOTAL  --   --   --   --   --   --   --   --   --   --   --  4.2*   BILITOTAL  --   --   --   --   --   --   --   --   --   --   --  0.6   ALKPHOS  --   --   --   --   --   --   --   --   --   --   --  192*   ALT  --   --   --   --   --   --   --   --   --   --   --  326*   AST  --   --   --   --   --   --   --   --   --   --   --  301*    < > = values in this interval not displayed.       Most Recent 3 CBC's:  Recent Labs   Lab Test 04/08/24 0631 04/07/24 2058 04/07/24 0628   WBC 6.5 6.7 6.9   HGB 8.7* 8.0* 8.8*   MCV 83 83 84   PLT 55* 38* 38*     Most Recent 3 BMP's:  Recent Labs   Lab Test 04/08/24  1200 04/08/24  0631 04/08/24 0616 04/07/24  1140 04/07/24  1058 04/06/24  0855 04/06/24  0653   NA  --  127*  --   --  134*  --  136   POTASSIUM  --  4.4  --   --  3.6  --  3.7   CHLORIDE  --  93*  --   --  99  --  101   CO2  --  24  --   --  23  --  23   BUN  --  55.8*  --   --  37.0*  --  35.7*   CR  --  2.85*  --   --  2.16*  --  2.38*   ANIONGAP  --  10  --   --  12  --  12   SONIA  --  7.0*  --   --  6.9*  --  7.1*   * 123* 127*   < > 118*   < > 135*    < > = values in this interval not displayed.     Most Recent 2 LFT's:  Recent Labs   Lab Test 04/04/24  0541 10/07/23  1950   * 18   * 15   ALKPHOS 192* 35   BILITOTAL 0.6 0.2     Most  Recent 3 INR's:  Recent Labs   Lab Test 10/07/23  0516 10/06/23  0551 10/05/23  1014   INR 1.38* 1.08 0.95     Most Recent 3 Troponin's:  Recent Labs   Lab Test 06/10/20  1243 02/16/20  1824 09/18/19  0739   TROPI <0.015 <0.015 <0.015     Most Recent 3 BNP's:No lab results found.  Most Recent D-dimer:  Recent Labs   Lab Test 08/13/21  0934   DD 10.38*     Most Recent Cholesterol Panel:  Recent Labs   Lab Test 10/03/23  0941   CHOL 137   LDL 69   HDL 54   TRIG 68     Most Recent 6 Bacteria Isolates From Any Culture (See EPIC Reports for Culture Details):No lab results found.  Most Recent TSH and T4:  Recent Labs   Lab Test 01/05/21  1119 02/04/19  0934 03/28/17  0922   TSH 0.77   < > 1.37   T4  --   --  1.19    < > = values in this interval not displayed.     Most Recent Urinalysis:  Recent Labs   Lab Test 02/04/19  0935   COLOR Yellow   APPEARANCE Clear   URINEGLC Negative   URINEBILI Negative   URINEKETONE Negative   SG 1.010   UBLD Negative   URINEPH 6.0   PROTEIN Negative   UROBILINOGEN 0.2   NITRITE Negative   LEUKEST Negative   RBCU O - 2   WBCU 0 - 5     Most Recent ESR & CRP:  Recent Labs   Lab Test 11/13/23  1705   SED 39*

## 2024-04-08 NOTE — PROGRESS NOTES
VASCULAR SURGERY    Visited with the patient and her family.  Underwent dialysis earlier today.  Still with oliguric LIMA with guarded prognosis that I discussed with her and her family.      As the right AKA with wound VAC.  Removed portion of the PTFE thrombosed graft explaining the ecchymosis in the anterior lateral thigh and I discussed this with patient and family.    She does have some mild confusion but easily recognizes me and carries on conversation with her family.  This is not uncommon after critical illness and should resolve and this was discussed.    Patient has a feeding tube getting nutrition.  Swallowing her pills with no issue.  I see no reason that she can advance to a regular diet.  She will need to be n.p.o. before surgery but date is not yet decided with Dr. Hernandez for closure of her AKA.  Will check with hospitalist service about this.    No evidence of GI bleeding with anemia.  Will be on aspirin indefinitely but does not need to go back on Eliquis.       Chadwick Lozano MD

## 2024-04-09 ENCOUNTER — ANESTHESIA (OUTPATIENT)
Dept: SURGERY | Facility: CLINIC | Age: 64
DRG: 270 | End: 2024-04-09
Payer: COMMERCIAL

## 2024-04-09 ENCOUNTER — ANESTHESIA EVENT (OUTPATIENT)
Dept: SURGERY | Facility: CLINIC | Age: 64
DRG: 270 | End: 2024-04-09
Payer: COMMERCIAL

## 2024-04-09 ENCOUNTER — APPOINTMENT (OUTPATIENT)
Dept: SURGERY | Facility: PHYSICIAN GROUP | Age: 64
End: 2024-04-09
Payer: COMMERCIAL

## 2024-04-09 LAB
ALBUMIN SERPL BCG-MCNC: 1.8 G/DL (ref 3.5–5.2)
ALP SERPL-CCNC: 155 U/L (ref 40–150)
ALT SERPL W P-5'-P-CCNC: 53 U/L (ref 0–50)
ANION GAP SERPL CALCULATED.3IONS-SCNC: 10 MMOL/L (ref 7–15)
ANION GAP SERPL CALCULATED.3IONS-SCNC: 8 MMOL/L (ref 7–15)
AST SERPL W P-5'-P-CCNC: 68 U/L (ref 0–45)
BILIRUB SERPL-MCNC: 0.9 MG/DL
BLD PROD TYP BPU: NORMAL
BLOOD COMPONENT TYPE: NORMAL
BUN SERPL-MCNC: 48.3 MG/DL (ref 8–23)
BUN SERPL-MCNC: 54 MG/DL (ref 8–23)
CALCIUM SERPL-MCNC: 7.1 MG/DL (ref 8.8–10.2)
CALCIUM SERPL-MCNC: 7.4 MG/DL (ref 8.8–10.2)
CHLORIDE SERPL-SCNC: 91 MMOL/L (ref 98–107)
CHLORIDE SERPL-SCNC: 94 MMOL/L (ref 98–107)
CODING SYSTEM: NORMAL
CREAT SERPL-MCNC: 2.66 MG/DL (ref 0.51–0.95)
CREAT SERPL-MCNC: 2.84 MG/DL (ref 0.51–0.95)
CROSSMATCH: NORMAL
DEPRECATED HCO3 PLAS-SCNC: 23 MMOL/L (ref 22–29)
DEPRECATED HCO3 PLAS-SCNC: 25 MMOL/L (ref 22–29)
EGFRCR SERPLBLD CKD-EPI 2021: 18 ML/MIN/1.73M2
EGFRCR SERPLBLD CKD-EPI 2021: 19 ML/MIN/1.73M2
ERYTHROCYTE [DISTWIDTH] IN BLOOD BY AUTOMATED COUNT: 15.2 % (ref 10–15)
ERYTHROCYTE [DISTWIDTH] IN BLOOD BY AUTOMATED COUNT: 16.3 % (ref 10–15)
GLUCOSE BLDC GLUCOMTR-MCNC: 113 MG/DL (ref 70–99)
GLUCOSE BLDC GLUCOMTR-MCNC: 118 MG/DL (ref 70–99)
GLUCOSE BLDC GLUCOMTR-MCNC: 80 MG/DL (ref 70–99)
GLUCOSE BLDC GLUCOMTR-MCNC: 80 MG/DL (ref 70–99)
GLUCOSE BLDC GLUCOMTR-MCNC: 86 MG/DL (ref 70–99)
GLUCOSE SERPL-MCNC: 112 MG/DL (ref 70–99)
GLUCOSE SERPL-MCNC: 78 MG/DL (ref 70–99)
HCT VFR BLD AUTO: 20 % (ref 35–47)
HCT VFR BLD AUTO: 26 % (ref 35–47)
HGB BLD-MCNC: 6.7 G/DL (ref 11.7–15.7)
HGB BLD-MCNC: 9 G/DL (ref 11.7–15.7)
ISSUE DATE AND TIME: NORMAL
MAGNESIUM SERPL-MCNC: 1.5 MG/DL (ref 1.7–2.3)
MAGNESIUM SERPL-MCNC: 2.4 MG/DL (ref 1.7–2.3)
MAGNESIUM SERPL-MCNC: 2.5 MG/DL (ref 1.7–2.3)
MCH RBC QN AUTO: 28.3 PG (ref 26.5–33)
MCH RBC QN AUTO: 29.7 PG (ref 26.5–33)
MCHC RBC AUTO-ENTMCNC: 33.5 G/DL (ref 31.5–36.5)
MCHC RBC AUTO-ENTMCNC: 34.6 G/DL (ref 31.5–36.5)
MCV RBC AUTO: 84 FL (ref 78–100)
MCV RBC AUTO: 86 FL (ref 78–100)
PHOSPHATE SERPL-MCNC: 3.1 MG/DL (ref 2.5–4.5)
PHOSPHATE SERPL-MCNC: 3.2 MG/DL (ref 2.5–4.5)
PHOSPHATE SERPL-MCNC: 4.1 MG/DL (ref 2.5–4.5)
PLATELET # BLD AUTO: 105 10E3/UL (ref 150–450)
PLATELET # BLD AUTO: 48 10E3/UL (ref 150–450)
POTASSIUM SERPL-SCNC: 4.9 MMOL/L (ref 3.4–5.3)
POTASSIUM SERPL-SCNC: 5.5 MMOL/L (ref 3.4–5.3)
PROT SERPL-MCNC: 3.9 G/DL (ref 6.4–8.3)
RBC # BLD AUTO: 2.37 10E6/UL (ref 3.8–5.2)
RBC # BLD AUTO: 3.03 10E6/UL (ref 3.8–5.2)
SODIUM SERPL-SCNC: 124 MMOL/L (ref 135–145)
SODIUM SERPL-SCNC: 127 MMOL/L (ref 135–145)
UNIT ABO/RH: NORMAL
UNIT NUMBER: NORMAL
UNIT STATUS: NORMAL
UNIT TYPE ISBT: 6200
WBC # BLD AUTO: 3.9 10E3/UL (ref 4–11)
WBC # BLD AUTO: 3.9 10E3/UL (ref 4–11)

## 2024-04-09 PROCEDURE — 250N000013 HC RX MED GY IP 250 OP 250 PS 637: Performed by: STUDENT IN AN ORGANIZED HEALTH CARE EDUCATION/TRAINING PROGRAM

## 2024-04-09 PROCEDURE — 94640 AIRWAY INHALATION TREATMENT: CPT | Mod: 76

## 2024-04-09 PROCEDURE — 99233 SBSQ HOSP IP/OBS HIGH 50: CPT | Performed by: INTERNAL MEDICINE

## 2024-04-09 PROCEDURE — 84100 ASSAY OF PHOSPHORUS: CPT | Performed by: STUDENT IN AN ORGANIZED HEALTH CARE EDUCATION/TRAINING PROGRAM

## 2024-04-09 PROCEDURE — 250N000011 HC RX IP 250 OP 636: Performed by: INTERNAL MEDICINE

## 2024-04-09 PROCEDURE — 250N000025 HC SEVOFLURANE, PER MIN: Performed by: SURGERY

## 2024-04-09 PROCEDURE — P9035 PLATELET PHERES LEUKOREDUCED: HCPCS | Performed by: STUDENT IN AN ORGANIZED HEALTH CARE EDUCATION/TRAINING PROGRAM

## 2024-04-09 PROCEDURE — 370N000017 HC ANESTHESIA TECHNICAL FEE, PER MIN: Performed by: SURGERY

## 2024-04-09 PROCEDURE — 360N000076 HC SURGERY LEVEL 3, PER MIN: Performed by: SURGERY

## 2024-04-09 PROCEDURE — 99233 SBSQ HOSP IP/OBS HIGH 50: CPT | Performed by: STUDENT IN AN ORGANIZED HEALTH CARE EDUCATION/TRAINING PROGRAM

## 2024-04-09 PROCEDURE — 250N000011 HC RX IP 250 OP 636: Performed by: STUDENT IN AN ORGANIZED HEALTH CARE EDUCATION/TRAINING PROGRAM

## 2024-04-09 PROCEDURE — P9016 RBC LEUKOCYTES REDUCED: HCPCS | Performed by: STUDENT IN AN ORGANIZED HEALTH CARE EDUCATION/TRAINING PROGRAM

## 2024-04-09 PROCEDURE — 250N000011 HC RX IP 250 OP 636: Performed by: ANESTHESIOLOGY

## 2024-04-09 PROCEDURE — 27590 AMPUTATE LEG AT THIGH: CPT | Performed by: NURSE ANESTHETIST, CERTIFIED REGISTERED

## 2024-04-09 PROCEDURE — 250N000009 HC RX 250: Performed by: NURSE ANESTHETIST, CERTIFIED REGISTERED

## 2024-04-09 PROCEDURE — 258N000003 HC RX IP 258 OP 636: Performed by: STUDENT IN AN ORGANIZED HEALTH CARE EDUCATION/TRAINING PROGRAM

## 2024-04-09 PROCEDURE — 250N000009 HC RX 250: Performed by: SURGERY

## 2024-04-09 PROCEDURE — 27590 AMPUTATE LEG AT THIGH: CPT | Performed by: ANESTHESIOLOGY

## 2024-04-09 PROCEDURE — 250N000011 HC RX IP 250 OP 636: Performed by: NURSE ANESTHETIST, CERTIFIED REGISTERED

## 2024-04-09 PROCEDURE — 82728 ASSAY OF FERRITIN: CPT | Performed by: INTERNAL MEDICINE

## 2024-04-09 PROCEDURE — 258N000003 HC RX IP 258 OP 636: Performed by: NURSE ANESTHETIST, CERTIFIED REGISTERED

## 2024-04-09 PROCEDURE — 85014 HEMATOCRIT: CPT

## 2024-04-09 PROCEDURE — 999N000157 HC STATISTIC RCP TIME EA 10 MIN

## 2024-04-09 PROCEDURE — 27590 AMPUTATE LEG AT THIGH: CPT | Mod: 58 | Performed by: SURGERY

## 2024-04-09 PROCEDURE — 83735 ASSAY OF MAGNESIUM: CPT | Performed by: STUDENT IN AN ORGANIZED HEALTH CARE EDUCATION/TRAINING PROGRAM

## 2024-04-09 PROCEDURE — 82310 ASSAY OF CALCIUM: CPT

## 2024-04-09 PROCEDURE — 84100 ASSAY OF PHOSPHORUS: CPT

## 2024-04-09 PROCEDURE — 88307 TISSUE EXAM BY PATHOLOGIST: CPT | Mod: TC | Performed by: SURGERY

## 2024-04-09 PROCEDURE — 250N000009 HC RX 250: Performed by: STUDENT IN AN ORGANIZED HEALTH CARE EDUCATION/TRAINING PROGRAM

## 2024-04-09 PROCEDURE — 0Y6C0Z3 DETACHMENT AT RIGHT UPPER LEG, LOW, OPEN APPROACH: ICD-10-PCS | Performed by: SURGERY

## 2024-04-09 PROCEDURE — 120N000001 HC R&B MED SURG/OB

## 2024-04-09 PROCEDURE — 710N000009 HC RECOVERY PHASE 1, LEVEL 1, PER MIN: Performed by: SURGERY

## 2024-04-09 PROCEDURE — 85027 COMPLETE CBC AUTOMATED: CPT | Performed by: STUDENT IN AN ORGANIZED HEALTH CARE EDUCATION/TRAINING PROGRAM

## 2024-04-09 PROCEDURE — 250N000011 HC RX IP 250 OP 636: Performed by: SURGERY

## 2024-04-09 PROCEDURE — 88307 TISSUE EXAM BY PATHOLOGIST: CPT | Mod: 26 | Performed by: PATHOLOGY

## 2024-04-09 PROCEDURE — 83735 ASSAY OF MAGNESIUM: CPT

## 2024-04-09 PROCEDURE — 80053 COMPREHEN METABOLIC PANEL: CPT | Performed by: STUDENT IN AN ORGANIZED HEALTH CARE EDUCATION/TRAINING PROGRAM

## 2024-04-09 PROCEDURE — 272N000001 HC OR GENERAL SUPPLY STERILE: Performed by: SURGERY

## 2024-04-09 PROCEDURE — 258N000003 HC RX IP 258 OP 636: Performed by: ANESTHESIOLOGY

## 2024-04-09 PROCEDURE — 999N000141 HC STATISTIC PRE-PROCEDURE NURSING ASSESSMENT: Performed by: SURGERY

## 2024-04-09 PROCEDURE — 250N000011 HC RX IP 250 OP 636

## 2024-04-09 RX ORDER — SODIUM CHLORIDE 9 MG/ML
INJECTION, SOLUTION INTRAVENOUS CONTINUOUS
Status: DISCONTINUED | OUTPATIENT
Start: 2024-04-09 | End: 2024-04-09 | Stop reason: HOSPADM

## 2024-04-09 RX ORDER — FENTANYL CITRATE 50 UG/ML
INJECTION, SOLUTION INTRAMUSCULAR; INTRAVENOUS PRN
Status: DISCONTINUED | OUTPATIENT
Start: 2024-04-09 | End: 2024-04-09

## 2024-04-09 RX ORDER — HYDROMORPHONE HCL IN WATER/PF 6 MG/30 ML
0.2 PATIENT CONTROLLED ANALGESIA SYRINGE INTRAVENOUS EVERY 5 MIN PRN
Status: DISCONTINUED | OUTPATIENT
Start: 2024-04-09 | End: 2024-04-09 | Stop reason: HOSPADM

## 2024-04-09 RX ORDER — NALOXONE HYDROCHLORIDE 0.4 MG/ML
0.1 INJECTION, SOLUTION INTRAMUSCULAR; INTRAVENOUS; SUBCUTANEOUS
Status: DISCONTINUED | OUTPATIENT
Start: 2024-04-09 | End: 2024-04-09 | Stop reason: HOSPADM

## 2024-04-09 RX ORDER — B COMPLEX, C NO.20/FOLIC ACID 1 MG
1 CAPSULE ORAL DAILY
Status: DISCONTINUED | OUTPATIENT
Start: 2024-04-09 | End: 2024-04-22 | Stop reason: HOSPADM

## 2024-04-09 RX ORDER — PROPOFOL 10 MG/ML
INJECTION, EMULSION INTRAVENOUS PRN
Status: DISCONTINUED | OUTPATIENT
Start: 2024-04-09 | End: 2024-04-09

## 2024-04-09 RX ORDER — AMLODIPINE BESYLATE 5 MG/1
5 TABLET ORAL ONCE
Qty: 1 TABLET | Refills: 0 | Status: COMPLETED | OUTPATIENT
Start: 2024-04-09 | End: 2024-04-09

## 2024-04-09 RX ORDER — HYDROMORPHONE HCL IN WATER/PF 6 MG/30 ML
0.4 PATIENT CONTROLLED ANALGESIA SYRINGE INTRAVENOUS
Status: DISCONTINUED | OUTPATIENT
Start: 2024-04-09 | End: 2024-04-22 | Stop reason: HOSPADM

## 2024-04-09 RX ORDER — MAGNESIUM SULFATE HEPTAHYDRATE 40 MG/ML
2 INJECTION, SOLUTION INTRAVENOUS ONCE
Status: COMPLETED | OUTPATIENT
Start: 2024-04-09 | End: 2024-04-09

## 2024-04-09 RX ORDER — AMLODIPINE BESYLATE 5 MG/1
5 TABLET ORAL DAILY
Status: DISCONTINUED | OUTPATIENT
Start: 2024-04-10 | End: 2024-04-22 | Stop reason: HOSPADM

## 2024-04-09 RX ORDER — VANCOMYCIN HYDROCHLORIDE 1 G/200ML
1000 INJECTION, SOLUTION INTRAVENOUS
Status: COMPLETED | OUTPATIENT
Start: 2024-04-09 | End: 2024-04-09

## 2024-04-09 RX ORDER — HYDROMORPHONE HCL IN WATER/PF 6 MG/30 ML
0.4 PATIENT CONTROLLED ANALGESIA SYRINGE INTRAVENOUS EVERY 5 MIN PRN
Status: DISCONTINUED | OUTPATIENT
Start: 2024-04-09 | End: 2024-04-09 | Stop reason: HOSPADM

## 2024-04-09 RX ORDER — FENTANYL CITRATE 0.05 MG/ML
25 INJECTION, SOLUTION INTRAMUSCULAR; INTRAVENOUS EVERY 5 MIN PRN
Status: DISCONTINUED | OUTPATIENT
Start: 2024-04-09 | End: 2024-04-09 | Stop reason: HOSPADM

## 2024-04-09 RX ORDER — DEXAMETHASONE SODIUM PHOSPHATE 4 MG/ML
INJECTION, SOLUTION INTRA-ARTICULAR; INTRALESIONAL; INTRAMUSCULAR; INTRAVENOUS; SOFT TISSUE PRN
Status: DISCONTINUED | OUTPATIENT
Start: 2024-04-09 | End: 2024-04-09

## 2024-04-09 RX ORDER — ONDANSETRON 4 MG/1
4 TABLET, ORALLY DISINTEGRATING ORAL EVERY 30 MIN PRN
Status: DISCONTINUED | OUTPATIENT
Start: 2024-04-09 | End: 2024-04-09 | Stop reason: HOSPADM

## 2024-04-09 RX ORDER — FENTANYL CITRATE 0.05 MG/ML
50 INJECTION, SOLUTION INTRAMUSCULAR; INTRAVENOUS EVERY 5 MIN PRN
Status: DISCONTINUED | OUTPATIENT
Start: 2024-04-09 | End: 2024-04-09 | Stop reason: HOSPADM

## 2024-04-09 RX ORDER — SODIUM CHLORIDE 9 MG/ML
INJECTION, SOLUTION INTRAVENOUS CONTINUOUS
Status: DISCONTINUED | OUTPATIENT
Start: 2024-04-09 | End: 2024-04-22 | Stop reason: HOSPADM

## 2024-04-09 RX ORDER — ONDANSETRON 2 MG/ML
INJECTION INTRAMUSCULAR; INTRAVENOUS PRN
Status: DISCONTINUED | OUTPATIENT
Start: 2024-04-09 | End: 2024-04-09

## 2024-04-09 RX ORDER — SODIUM CHLORIDE, SODIUM LACTATE, POTASSIUM CHLORIDE, CALCIUM CHLORIDE 600; 310; 30; 20 MG/100ML; MG/100ML; MG/100ML; MG/100ML
INJECTION, SOLUTION INTRAVENOUS CONTINUOUS
Status: DISCONTINUED | OUTPATIENT
Start: 2024-04-09 | End: 2024-04-09 | Stop reason: HOSPADM

## 2024-04-09 RX ORDER — LIDOCAINE HYDROCHLORIDE 20 MG/ML
INJECTION, SOLUTION INFILTRATION; PERINEURAL PRN
Status: DISCONTINUED | OUTPATIENT
Start: 2024-04-09 | End: 2024-04-09

## 2024-04-09 RX ORDER — HYDROMORPHONE HCL IN WATER/PF 6 MG/30 ML
0.2 PATIENT CONTROLLED ANALGESIA SYRINGE INTRAVENOUS
Status: DISCONTINUED | OUTPATIENT
Start: 2024-04-09 | End: 2024-04-22 | Stop reason: HOSPADM

## 2024-04-09 RX ORDER — ARGININE/GLUTAMINE/CALCIUM BMB 7G-7G-1.5G
1 POWDER IN PACKET (EA) ORAL 2 TIMES DAILY
Status: DISCONTINUED | OUTPATIENT
Start: 2024-04-09 | End: 2024-04-22 | Stop reason: HOSPADM

## 2024-04-09 RX ORDER — ONDANSETRON 2 MG/ML
4 INJECTION INTRAMUSCULAR; INTRAVENOUS EVERY 30 MIN PRN
Status: DISCONTINUED | OUTPATIENT
Start: 2024-04-09 | End: 2024-04-09 | Stop reason: HOSPADM

## 2024-04-09 RX ADMIN — DEXAMETHASONE SODIUM PHOSPHATE 4 MG: 4 INJECTION, SOLUTION INTRA-ARTICULAR; INTRALESIONAL; INTRAMUSCULAR; INTRAVENOUS; SOFT TISSUE at 11:29

## 2024-04-09 RX ADMIN — SODIUM CHLORIDE: 9 INJECTION, SOLUTION INTRAVENOUS at 10:42

## 2024-04-09 RX ADMIN — FENTANYL CITRATE 50 MCG: 50 INJECTION INTRAMUSCULAR; INTRAVENOUS at 11:07

## 2024-04-09 RX ADMIN — FENTANYL CITRATE 50 MCG: 50 INJECTION, SOLUTION INTRAMUSCULAR; INTRAVENOUS at 13:03

## 2024-04-09 RX ADMIN — MIDAZOLAM 1 MG: 1 INJECTION INTRAMUSCULAR; INTRAVENOUS at 11:02

## 2024-04-09 RX ADMIN — MAGNESIUM SULFATE HEPTAHYDRATE 2 G: 40 INJECTION, SOLUTION INTRAVENOUS at 14:58

## 2024-04-09 RX ADMIN — CARVEDILOL 6.25 MG: 6.25 TABLET, FILM COATED ORAL at 18:00

## 2024-04-09 RX ADMIN — Medication 1 CAPSULE: at 14:59

## 2024-04-09 RX ADMIN — AMLODIPINE BESYLATE 2.5 MG: 2.5 TABLET ORAL at 13:52

## 2024-04-09 RX ADMIN — BETAMETHASONE DIPROPIONATE: 0.5 CREAM TOPICAL at 15:04

## 2024-04-09 RX ADMIN — ROCURONIUM BROMIDE 50 MG: 50 INJECTION, SOLUTION INTRAVENOUS at 11:07

## 2024-04-09 RX ADMIN — HYDROMORPHONE HYDROCHLORIDE 0.4 MG: 0.2 INJECTION, SOLUTION INTRAMUSCULAR; INTRAVENOUS; SUBCUTANEOUS at 17:56

## 2024-04-09 RX ADMIN — SUGAMMADEX 200 MG: 100 INJECTION, SOLUTION INTRAVENOUS at 11:50

## 2024-04-09 RX ADMIN — LIDOCAINE HYDROCHLORIDE 100 MG: 20 INJECTION, SOLUTION INFILTRATION; PERINEURAL at 11:07

## 2024-04-09 RX ADMIN — HYDROMORPHONE HYDROCHLORIDE 0.2 MG: 0.2 INJECTION, SOLUTION INTRAMUSCULAR; INTRAVENOUS; SUBCUTANEOUS at 14:07

## 2024-04-09 RX ADMIN — FENTANYL CITRATE 50 MCG: 50 INJECTION, SOLUTION INTRAMUSCULAR; INTRAVENOUS at 12:32

## 2024-04-09 RX ADMIN — ACETAMINOPHEN 650 MG: 325 TABLET, FILM COATED ORAL at 20:43

## 2024-04-09 RX ADMIN — ACETAMINOPHEN 650 MG: 325 TABLET, FILM COATED ORAL at 14:20

## 2024-04-09 RX ADMIN — PHENYLEPHRINE HYDROCHLORIDE 200 MCG: 10 INJECTION INTRAVENOUS at 11:18

## 2024-04-09 RX ADMIN — BETAMETHASONE DIPROPIONATE: 0.5 CREAM TOPICAL at 20:21

## 2024-04-09 RX ADMIN — NICOTINE 1 PATCH: 14 PATCH, EXTENDED RELEASE TRANSDERMAL at 13:53

## 2024-04-09 RX ADMIN — PHENYLEPHRINE HYDROCHLORIDE 150 MCG: 10 INJECTION INTRAVENOUS at 11:20

## 2024-04-09 RX ADMIN — PROPOFOL 100 MG: 10 INJECTION, EMULSION INTRAVENOUS at 11:07

## 2024-04-09 RX ADMIN — HYDROMORPHONE HYDROCHLORIDE 0.4 MG: 0.2 INJECTION, SOLUTION INTRAMUSCULAR; INTRAVENOUS; SUBCUTANEOUS at 20:44

## 2024-04-09 RX ADMIN — FLUTICASONE FUROATE AND VILANTEROL TRIFENATATE 1 PUFF: 200; 25 POWDER RESPIRATORY (INHALATION) at 15:04

## 2024-04-09 RX ADMIN — PHENYLEPHRINE HYDROCHLORIDE 200 MCG: 10 INJECTION INTRAVENOUS at 11:39

## 2024-04-09 RX ADMIN — OXYCODONE HYDROCHLORIDE 5 MG: 5 TABLET ORAL at 14:20

## 2024-04-09 RX ADMIN — VANCOMYCIN HYDROCHLORIDE 1000 MG: 1 INJECTION, SOLUTION INTRAVENOUS at 10:41

## 2024-04-09 RX ADMIN — PHENYLEPHRINE HYDROCHLORIDE 0.4 MCG/KG/MIN: 10 INJECTION INTRAVENOUS at 11:15

## 2024-04-09 RX ADMIN — PHENYLEPHRINE HYDROCHLORIDE 100 MCG: 10 INJECTION INTRAVENOUS at 11:15

## 2024-04-09 RX ADMIN — ONDANSETRON 4 MG: 2 INJECTION INTRAMUSCULAR; INTRAVENOUS at 11:42

## 2024-04-09 RX ADMIN — SODIUM CHLORIDE: 9 INJECTION, SOLUTION INTRAVENOUS at 14:58

## 2024-04-09 RX ADMIN — ALBUTEROL SULFATE 2.5 MG: 2.5 SOLUTION RESPIRATORY (INHALATION) at 07:51

## 2024-04-09 RX ADMIN — ALBUTEROL SULFATE 2.5 MG: 2.5 SOLUTION RESPIRATORY (INHALATION) at 19:21

## 2024-04-09 RX ADMIN — ALBUTEROL SULFATE 2.5 MG: 2.5 SOLUTION RESPIRATORY (INHALATION) at 01:30

## 2024-04-09 RX ADMIN — PHENYLEPHRINE HYDROCHLORIDE 150 MCG: 10 INJECTION INTRAVENOUS at 11:23

## 2024-04-09 RX ADMIN — HYDROMORPHONE HYDROCHLORIDE 0.2 MG: 0.2 INJECTION, SOLUTION INTRAMUSCULAR; INTRAVENOUS; SUBCUTANEOUS at 01:30

## 2024-04-09 RX ADMIN — AMLODIPINE BESYLATE 5 MG: 5 TABLET ORAL at 17:00

## 2024-04-09 ASSESSMENT — ACTIVITIES OF DAILY LIVING (ADL)
ADLS_ACUITY_SCORE: 39
ADLS_ACUITY_SCORE: 38
ADLS_ACUITY_SCORE: 39
ADLS_ACUITY_SCORE: 38
ADLS_ACUITY_SCORE: 39
ADLS_ACUITY_SCORE: 38
ADLS_ACUITY_SCORE: 39
ADLS_ACUITY_SCORE: 38
ADLS_ACUITY_SCORE: 39
ADLS_ACUITY_SCORE: 38

## 2024-04-09 ASSESSMENT — COPD QUESTIONNAIRES: COPD: 1

## 2024-04-09 ASSESSMENT — ENCOUNTER SYMPTOMS: DYSRHYTHMIAS: 1

## 2024-04-09 ASSESSMENT — LIFESTYLE VARIABLES: TOBACCO_USE: 1

## 2024-04-09 NOTE — PLAN OF CARE
Goal Outcome Evaluation:           Overall Patient Progress: improvingOverall Patient Progress: improving    Outcome Evaluation: TF discontinued today. CLD-ADAT. Kcal ct to start 4/10. ordered supplements    Keiko Briceno RD, LD

## 2024-04-09 NOTE — PROGRESS NOTES
Renal Medicine Progress Note            Assessment/Plan:     # Patient with a creatinine of 0.45-0.9 mg/dl at baseline.      # Severe LIMA: Likely BAILEY +/- rhabdo +/- ischemic injury               -dialysis dependent     # Ischemic RLE due to occluded bypass graft   -AKA due to unsalvagable limb 04/01/24     # Anemia and thrombocytopenia:  Hgb seems stable.      # FEN: trace edema. K and bciarb are okay. HypoMg.         Plan:  # Renal panel in AM.   # Add-on iron studies  # Mg replacement protocol  # Coordinator to help with outpatient dialysis unit placement    I discussed the plan with Dr. Alba and RN at the bedside.          Interval History:     Afebrile. VSS.   She complains that her wrap is too tight.   She had a unit of PRBC for hgb of 6.7.  No blood in stool per RN.   Remains anuric.           Medications and Allergies:     Current Facility-Administered Medications   Medication Dose Route Frequency Provider Last Rate Last Admin    - MEDICATION INSTRUCTIONS for Dialysis Patients -   Does not apply See Admin Instructions Marcin White MD        albuterol (PROVENTIL) neb solution 2.5 mg  2.5 mg Nebulization Q6H Marcin White MD   2.5 mg at 04/09/24 0751    [START ON 4/10/2024] amLODIPine (NORVASC) tablet 5 mg  5 mg Oral Daily Marcin White MD        amLODIPine (NORVASC) tablet 5 mg  5 mg Oral Once Marcin White MD        betamethasone dipropionate (DIPROSONE) 0.05 % cream   Topical BID Marcin White MD   Given at 04/07/24 2311    carvedilol (COREG) tablet 6.25 mg  6.25 mg Oral BID w/meals Marcin White MD   6.25 mg at 04/08/24 1712    Contraindications to both pharmacological and mechanical prophylaxis (must document contraindications for both in this order)   Does not apply See Admin Instructions Marcin White MD        famotidine (PEPCID) injection 20 mg  20 mg Intravenous Q48H Marcin White MD   20 mg at 04/08/24 0632    fluticasone-vilanterol (BREO ELLIPTA) 200-25 MCG/ACT inhaler 1 puff  1 puff Inhalation Daily  "Marcin White MD   1 puff at 04/07/24 0847    insulin aspart (NovoLOG) injection (RAPID ACTING)  1-7 Units Subcutaneous TID AC Marcin White MD        insulin aspart (NovoLOG) injection (RAPID ACTING)  1-5 Units Subcutaneous At Bedtime Marcin White MD        Rigo PACK 1 packet  1 packet Oral BID 09 12 Gala Morales DO        multivitamin RENAL (TRIPHROCAPS) capsule 1 capsule  1 capsule Oral Daily Gala Morales DO        nicotine (NICODERM CQ) 14 MG/24HR 24 hr patch 1 patch  1 patch Transdermal Daily Marcin White MD   1 patch at 04/09/24 1353    sodium chloride (PF) 0.9% PF flush 3 mL  3 mL Intracatheter Q8H Marcin White MD   3 mL at 04/09/24 0637        Allergies   Allergen Reactions    Pantoprazole      Protonix caused diffuse edema    Chantix [Varenicline]      Terrible dreams    Contrast Dye Swelling     Patient reports facial and throat swelling with prior CT contrast.    Penicillins Itching and Rash            Physical Exam:   Vitals were reviewed   , Blood pressure (!) 176/74, pulse 77, temperature 97.9  F (36.6  C), temperature source Oral, resp. rate 10, height 1.549 m (5' 1\"), weight 53.9 kg (118 lb 13.3 oz), SpO2 95%, not currently breastfeeding.    Wt Readings from Last 3 Encounters:   04/09/24 53.9 kg (118 lb 13.3 oz)   01/08/24 49.8 kg (109 lb 12.8 oz)   12/12/23 50.8 kg (112 lb)       Intake/Output Summary (Last 24 hours) at 4/9/2024 1420  Last data filed at 4/9/2024 1215  Gross per 24 hour   Intake 1355 ml   Output 50 ml   Net 1305 ml     GENERAL APPEARANCE: Frail.   HEENT:  Eyes/ears/nose/neck grossly normal  RESP: lungs cta b c good efforts, no crackles, rhonchi or wheezes  CV: RRR  ABDOMEN: Soft, NT  EXTREMITIES/SKIN: LLE with trace edema. R AKA.   NEURO: Awake, alert and conversing normally  R TDC CDI         Data:     CBC RESULTS:     Recent Labs   Lab 04/09/24  0636 04/08/24  0631 04/07/24 2058 04/07/24  0628 04/06/24  1830 04/06/24  0653   WBC 3.9* 6.5 6.7 6.9 7.0 7.1   RBC 2.37* " 3.01* 2.87* 3.03* 2.98* 2.36*   HGB 6.7* 8.7* 8.0* 8.8* 8.7* 6.7*   HCT 20.0* 25.1* 23.9* 25.4* 25.1* 20.3*   PLT 48* 55* 38* 38* 36* 38*       Basic Metabolic Panel:  Recent Labs   Lab 04/09/24  1212 04/09/24  0636 04/09/24  0612 04/09/24  0154 04/08/24  2116 04/08/24  1608 04/08/24  1200 04/08/24  0631 04/07/24  1140 04/07/24  1058 04/06/24  0855 04/06/24  0653 04/05/24  0831 04/05/24  0615 04/04/24  2217   NA  --  127*  --   --   --   --   --  127*  --  134*  --  136  --  135 135   POTASSIUM  --  4.9  --   --   --   --   --  4.4  --  3.6  --  3.7  --  4.0 4.0   CHLORIDE  --  94*  --   --   --   --   --  93*  --  99  --  101  --  101 101   CO2  --  25  --   --   --   --   --  24  --  23  --  23  --  23 22   BUN  --  48.3*  --   --   --   --   --  55.8*  --  37.0*  --  35.7*  --  41.5* 35.1*   CR  --  2.66*  --   --   --   --   --  2.85*  --  2.16*  --  2.38*  --  2.67* 2.33*   GLC 86 78 80 80 93 130*   < > 123*   < > 118*   < > 135*   < > 110* 114*   SONIA  --  7.1*  --   --   --   --   --  7.0*  --  6.9*  --  7.1*  --  7.3* 7.3*    < > = values in this interval not displayed.       INRNo lab results found in last 7 days.   Attestation:   I have reviewed today's relevant vital signs, notes, medications, labs and imaging.    Torrey Alegria MD  [facility] - Nephrology  Office phone :[phone]  Pager: [phone]

## 2024-04-09 NOTE — ANESTHESIA PREPROCEDURE EVALUATION
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 clot    Post-menopausal     Reflux esophagitis 2004    EGD: esophagitis and medium HH    SBO (small bowel obstruction) (H) 08/10/2021    SVT (supraventricular tachycardia) (H24)     Thrombocytopenia (H24)     Uncomplicated asthma     Vitamin C deficiency 2018      Past Surgical History:   Procedure Laterality Date    AMPUTATE LEG ABOVE KNEE N/A 2024    Procedure: AMPUTATION, ABOVE KNEE right leg with wound vac dressing, excision of anteriotibial bypass graft, closure of (previous interventional radiology) left groin incision;  Surgeon: José Luis Hernandez MD;  Location:  OR    ANGIOGRAM      ANGIOGRAM Right 2021    Procedure: RIGHT LOWER EXTREMITY ANGIOGRAM WITH LEFT BRACHIAL ARTERY CUTDOWN;  Surgeon: José Luis Hernandez MD;  Location:  OR    BIOPSY MASS NECK Right 10/11/2023    Procedure: Right Parotid Biopsy;  Surgeon: Randal Mendoza MD;  Location:  OR    BYPASS GRAFT FEMOROPOPLITEAL  Right 09/23/2020    Procedure: RIGHT FEMORAL GRAFT TO ABOVE-KNEE POPLITEAL BYPASS USING CADAVERIC SUPERFICIAL FEMORAL ARTERY;  Surgeon: Chadwick Lozano MD;  Location:  OR    BYPASS GRAFT FEMOROPOPLITEAL Right 2/15/2022    Procedure: RIGHT SUPERFICIAL FEMORAL ARTERY GRAFT TO BELOW KNEE POPLITEAL BYPASS WITH CADAVERIC CRYOLIFE  FEMORAL-POPLITEAL ARTERY SIZE: OUTER DIAMETER: 7MM - 6MM;  Surgeon: Chadwick Lozano;  Location:  OR    BYPASS GRAFT FEMOROPOPLITEAL Right 5/26/2023    Procedure: RIGHT DISTAL SUPERFICIAL FEMORAL GRAFT TO ANTERIOR TIBIAL ARTERY WITH 26 CENTIMETER CADAVERIC SUPERFICIAL FEMORAL ARTERY GRAFT;  Surgeon: Chadwick Lozano MD;  Location:  OR    BYPASS GRAFT FEMOROPOPLITEAL Right 10/11/2023    Procedure: RIGHT FEMORAL TO POPLITEAL GRAFT THROMBECTOMY;  Surgeon: Chadwick Lozano MD;  Location:  OR    BYPASS GRAFT INSITU FEMOROPOPLITEAL Right 7/7/2021    Procedure: CREATION RIGHT FEMORAL TO POPLITEAL ARTERIAL BYPASS USING INSITU VEIN GRAFT;  Surgeon: José Luis Hernandez MD;  Location:  OR    CARDIAC CATHERIZATION  09/03/2009    multivessel CAD without target lesions, med mgmt indicated, preserved ef    CARPAL TUNNEL RELEASE RT/LT Right 05/20/2021    COLONOSCOPY N/A 12/12/2023    Procedure: Colonoscopy;  Surgeon: Corey Hoffman MD;  Location:  GI    COLONOSCOPY N/A 12/14/2023    Procedure: Colonoscopy;  Surgeon: Corey Hoffman MD;  Location:  GI    CV CORONARY ANGIOGRAM N/A 10/4/2023    Procedure: Coronary Angiogram;  Surgeon: Rogelio Ricks MD;  Location:  HEART CARDIAC CATH LAB    CV PCI N/A 10/4/2023    Procedure: Percutaneous Coronary Intervention;  Surgeon: Rogelio Ricks MD;  Location:  HEART CARDIAC CATH LAB    EMBOLECTOMY LOWER EXTREMITY Right 10/6/2023    Procedure: Right anterior tibial bypass with graft, Right tibial endarterectomy,thrombectomy, Right doraslis pedis thrombectomy, Anterior Tibial vein patch.;   Surgeon: Chadwick Lozano MD;  Location:  OR    ENDARTERECTOMY CAROTID Right 05/11/2016    Procedure: ENDARTERECTOMY CAROTID;  Surgeon: Chadwick Lozano MD;  Location:  OR    ENDARTERECTOMY CAROTID Left 06/08/2020    Procedure: LEFT CAROTID ENDARTERECTOMY with distal facal vein patch  and EEG;  Surgeon: Chadwick Lozano MD;  Location:  OR    ESOPHAGOSCOPY, GASTROSCOPY, DUODENOSCOPY (EGD), COMBINED N/A 12/12/2023    Procedure: Esophagoscopy, gastroscopy, duodenoscopy (EGD), combined;  Surgeon: Corey Hoffman MD;  Location:  GI    FINE NEEDLE ASPIRATION WITHOUT IMAGING GUIDANCE Right 9/22/2023    Procedure: Fine needle aspiration without imaging guidance;  Surgeon: Kiersten Aguilera MD;  Location:  GI    FLUORESCENCE INTRAOPERATIVE VASCULAR ANGIOGRAPHY Right 12/28/2022    Procedure: RIGHT LEG ANGIOGRAM with intervention;  Surgeon: Chadwick Lozano MD;  Location:  OR    GYN SURGERY  left tube    left salpingectomy    IR ANGIOGRAM THROUGH CATHETER (ARTERIAL)  10/6/2023    IR ANGIOGRAM THROUGH CATHETER (ARTERIAL)  10/6/2023    IR CVC TUNNEL PLACEMENT > 5 YRS OF AGE  4/3/2024    IR LOWER EXTREMITY ANGIOGRAM RIGHT  05/25/2021    IR LOWER EXTREMITY ANGIOGRAM RIGHT  10/5/2023    IR LOWER EXTREMITY ANGIOGRAM RIGHT  3/29/2024    IR OR ANGIOGRAM  6/23/2021    IR OR ANGIOGRAM  12/28/2022    LAPAROSCOPIC CHOLECYSTECTOMY N/A 09/27/2017    Procedure: LAPAROSCOPIC CHOLECYSTECTOMY;  LAPAROSCOPIC CHOLECYSTECTOMY;  Surgeon: Jacoby Tapia MD;  Location:  SD    LAPAROSCOPY DIAGNOSTIC (GENERAL) N/A 8/11/2021    Procedure: Exploratory laparoscopy,  laparoscopic lysis of adhesions, laparotomy;  Surgeon: Corina Ferreira MD;  Location:  OR    OR ANGIOGRAM, LOWER EXTREMITY Right 10/11/2023    Procedure: Or Angiogram, Lower Extremity;  Surgeon: Chadwick Lozano MD;  Location:  OR    ORTHOPEDIC SURGERY      left knee surgery    REPAIR ANEURYSM FEMORAL ARTERY Left  12/28/2022    Procedure: REPAIR LEFT FEMORAL PSEUDOANEURYSM;  Surgeon: Chadwick Lozano MD;  Location:  OR    VASCULAR SURGERY  aoto bi fem  left fem-AK pop    [facility] FABRIC WRAPPING OF ABDOMINAL ANEURYSM      [facility] NONSPECIFIC PROCEDURE  12/2000    angioplasty with stent right fem. a.    [facility] NONSPECIFIC PROCEDURE  1987    sinus surgery    [facility] NONSPECIFIC PROCEDURE  09/02/2009    Emergent left groin exploration with oversewing of bleeding angiographic site. 2. Endarterectomy of common femoral-proximal superficial femoral artery with greater saphenous vein patch angioplasty.   a. [place] of accessory right greater saphenous vein.     [facility] NONSPECIFIC PROCEDURE  08/27/2009    occluded left common iliac and external iliac arteries were successfully revascularized with stenting to 8 and 7 mm       Allergies   Allergen Reactions    Pantoprazole      Protonix caused diffuse edema    Chantix [Varenicline]      Terrible dreams    Contrast Dye Swelling     Patient reports facial and throat swelling with prior CT contrast.    Penicillins Itching and Rash      Social History     Tobacco Use    Smoking status: Some Days     Packs/day: 0.25     Years: 52.00     Additional pack years: 0.00     Total pack years: 13.00     Types: Cigarettes     Start date: 1968    Smokeless tobacco: Never    Tobacco comments:     1/2 PPD   Substance Use Topics    Alcohol use: Not Currently     Comment: x1-2 yr      Wt Readings from Last 1 Encounters:   04/09/24 53.9 kg (118 lb 13.3 oz)        Anesthesia Evaluation   Pt has had prior anesthetic. Type: General.    No history of anesthetic complications       ROS/MED HX  ENT/Pulmonary:     (+)           allergic rhinitis,     tobacco use, Current use,     asthma    COPD, O2 dependent,          (-) sleep apnea   Neurologic:    (-) no CVA and no TIA   Cardiovascular: Comment: svt    (+) Dyslipidemia hypertension- Peripheral Vascular Disease-  CAD - past MI - -   Taking blood thinners   CHF                   dysrhythmias, Other,        Previous cardiac testing   Echo: Date: 2023 Results:  Limited Echo Adult.     ______________________________________________________________________________  Interpretation Summary     The left ventricle is normal in structure, function and size.  The visual ejection fraction is 55-60%.  The right ventricle is normal in structure, function and size.  There is moderate trileaflet aortic sclerosis.  ______________________________________________________________________________  Left Ventricle  The left ventricle is normal in structure, function and size. There is normal  left ventricular wall thickness. Left ventricular systolic function is normal.  The visual ejection fraction is 55-60%. Normal left ventricular wall motion.     Right Ventricle  The right ventricle is normal in structure, function and size.     Atria  Normal left atrial size. Right atrial size is normal. There is no color  Doppler evidence of an atrial shunt.     Mitral Valve  The mitral valve leaflets appear thickened, but open well. There is trace  mitral regurgitation.     Tricuspid Valve  The tricuspid valve is normal in structure and function. There is trace  tricuspid regurgitation. Right ventricular systolic pressure could not be  approximated due to inadequate tricuspid regurgitation.     Aortic Valve  There is moderate trileaflet aortic sclerosis. No aortic regurgitation is  present.     Pulmonic Valve  The pulmonic valve is not well seen, but is grossly normal.     Pericardium  There is no pericardial effusion.     Rhythm  Sinus rhythm was noted.      Stress Test:  Date:  Results:  Name: KASIA WAN  MRN: [id number]  : 1960  Study Date: 2021 12:57 PM  Age: 60 yrs  Gender: Female  Patient Location: [facility]  Reason For Study: Light headedness  Ordering Physician: JACKIE DOWNEY  Referring Physician: Vasquez Benoit  Performed By: Corey James RDCS     BSA: 1.5 m2  Height: 62  in  Weight: 112 lb  HR: 57  BP: 140/78 mmHg  ______________________________________________________________________________  Procedure  Stress Echo Complete.     ______________________________________________________________________________  Interpretation Summary     Suboptimal stress test due to inadequate maximum heart rate.  Normal left ventricular function and wall motion at rest and post-stress but  LV size and function were unchanged following limited exercise of 3 minutes.  Post-exercise images were obtained with the heart rate in the 70's bpm.  Blood pressure seth appropriately from 140/78 mmHg (supine, baseline) to  158/80 mmHg during Stage I and fell to 134/74 mmHg in recovery. Consider  Lexiscan stress imaging (nuclear, MRI) for adequate testing.  ______________________________________________________________________________  Stress  Exercise was stopped due to fatigue.  There was a normal BP response to exercise.  Target Heart Rate was not achieved due to fatigue.  Suboptimal stress test due to inadequate maximum heart rate.  J point/ST elevation at rest pseudonormalized with exercise.  There was no arrhythmia.  The Duke treadmill score was intermediate risk ( -11< Sterling score <5).  Normal left ventricular function and wall motion at rest and post-stress.  The visual ejection fraction is estimated at 60-65%.     Baseline  The patient is in normal sinus rhythm.  Baseline ECG displays Q waves in the mike-septal leads.  Elevated J point V3-V6, inferior leads - most C/W early repolarization.  Normal left ventricular function and wall motion at rest and post-stress.  The visual ejection fraction is estimated at 60-65%.     Stress Results         Protocol:  Eliezer Protocol        Maximum Predicted HR:   158 bpm         Target HR: 134 bpm               % Maximum Predicted HR: 66 %        Stage  DurationHeart Rate  BP                    Comment             (mm:ss)   (bpm)    Stage 1   3:00      104    158/80Patient requested to stop   RecoveryR  4:00      56    134/74RPP = 76677, Sterling = 2, FAC = Below average             Stress Duration:   3:00 mm:ss        Recovery Time: 4:00 mm:ss          Maximum Stress HR: 104 bpm           METS:          4     Mitral Valve  There is no mitral regurgitation noted.     Tricuspid Valve  No tricuspid regurgitation.     Aortic Valve  No aortic regurgitation is present. No hemodynamically significant valvular  aortic stenosis.  ______________________________________________________________________________  MMode/2D Measurements & Calculations  IVSd: 1.00 cm     LVIDd: 4.0 cm  LVIDs: 2.8 cm  LVPWd: 0.73 cm  ECG Reviewed:  Date: Results:    Cath:  Date: 10/4/23 Results:     Prox RCA lesion is 45% stenosed.     RPAV-1 lesion is 30% stenosed.     RPAV-2 lesion is 50% stenosed.     1st Mrg lesion is 45% stenosed.     1st Diag lesion is 100% stenosed.     Dist Cx lesion is 55% stenosed.     1. Right dominant coronary anatomy.  2. Completely occluded (probably chronic) D1 with left to left collaterals from distal LAD to D1.  3. Moderate coronary artery disease involving proximal to mid RCA which is not hemodynamically significant-IFR negative.  4. Moderate coronary disease involving distal small-caliber RPL and distal circumflex artery.   (-) valvular problems/murmurs   METS/Exercise Tolerance: 3 - Able to walk 1-2 blocks without stopping    Hematologic: Comments: Lupus; thrombocytopenia    (+) History of blood clots,     anemia, history of blood transfusion,         Musculoskeletal: Comment: Charcot Breonna Tooth - neg musculoskeletal ROS     GI/Hepatic:     (+) GERD,                (-) liver disease   Renal/Genitourinary:    (-) renal disease   Endo:    (-) Type II DM, thyroid disease and obesity   Psychiatric/Substance Use:     (+) psychiatric history anxiety and depression   Recreational drug usage: Cannabis.    Infectious Disease:  - neg infectious disease ROS     Malignancy:  - neg  malignancy ROS     Other:  - neg other ROS          Physical Exam    Airway        Mallampati: II   TM distance: > 3 FB   Neck ROM: full   Mouth opening: > 3 cm    Respiratory Devices and Support         Dental       (+) Modest Abnormalities - crowns, retainers, 1 or 2 missing teeth      Cardiovascular          Rhythm and rate: regular     Pulmonary   pulmonary exam normal        (+) decreased breath sounds           OUTSIDE LABS:  CBC:   Lab Results   Component Value Date    WBC 3.9 (L) 04/09/2024    WBC 6.5 04/08/2024    HGB 6.7 (LL) 04/09/2024    HGB 8.7 (L) 04/08/2024    HCT 20.0 (L) 04/09/2024    HCT 25.1 (L) 04/08/2024    PLT 48 (LL) 04/09/2024    PLT 55 (L) 04/08/2024     BMP:   Lab Results   Component Value Date     (L) 04/09/2024     (L) 04/08/2024    POTASSIUM 4.9 04/09/2024    POTASSIUM 4.4 04/08/2024    CHLORIDE 94 (L) 04/09/2024    CHLORIDE 93 (L) 04/08/2024    CO2 25 04/09/2024    CO2 24 04/08/2024    BUN 48.3 (H) 04/09/2024    BUN 55.8 (H) 04/08/2024    CR 2.66 (H) 04/09/2024    CR 2.85 (H) 04/08/2024    GLC 78 04/09/2024    GLC 80 04/09/2024     COAGS:   Lab Results   Component Value Date    PTT 76 (H) 12/15/2023    INR 1.38 (H) 10/07/2023    FIBR 185 03/31/2024     POC:   Lab Results   Component Value Date    BGM 87 07/08/2021     HEPATIC:   Lab Results   Component Value Date    ALBUMIN 1.8 (L) 04/09/2024    PROTTOTAL 3.9 (L) 04/09/2024    ALT 53 (H) 04/09/2024    AST 68 (H) 04/09/2024    ALKPHOS 155 (H) 04/09/2024    BILITOTAL 0.9 04/09/2024     OTHER:   Lab Results   Component Value Date    PH 7.19 (LL) 03/31/2024    LACT 0.8 04/04/2024    A1C 5.2 01/08/2024    SONIA 7.1 (L) 04/09/2024    PHOS 3.2 04/09/2024    MAG 1.5 (L) 04/09/2024    LIPASE 132 08/10/2021    AMYLASE 46 08/14/2017    TSH 0.77 01/05/2021    T4 1.19 03/28/2017    CRP <2.9 09/18/2014    SED 39 (H) 11/13/2023       Anesthesia Plan    ASA Status:  4    NPO Status:  NPO Appropriate    Anesthesia Type: General.     - Airway:  ETT   Induction: Intravenous, Propofol.   Maintenance: Balanced.   Techniques and Equipment:     - Airway: Video-Laryngoscope     - Lines/Monitors: 2nd IV     Consents    Anesthesia Plan(s) and associated risks, benefits, and realistic alternatives discussed. Questions answered and patient/representative(s) expressed understanding.     - Discussed:     - Discussed with:  Patient       Use of blood products discussed: Yes.     - Discussed with: Patient.     Postoperative Care    Pain management: IV analgesics.   PONV prophylaxis: Ondansetron (or other 5HT-3), Dexamethasone or Solumedrol, Background Propofol Infusion     Comments:               Vasquez Parnell MD    I have reviewed the pertinent notes and labs in the chart from the past 30 days and (re)examined the patient.  Any updates or changes from those notes are reflected in this note.

## 2024-04-09 NOTE — PROGRESS NOTES
[facility]  Gastroenterology Progress Note     Shirley Hendricks MRN# [id number]   YOB: 1960 Age: 63 year old          Assessment and Plan:   Shirley Hendricks is a 63 year old female with a PMHx  of PAD/PVD, Tobacco use, CAD (history of MI x3), HTN, HLP, DM2, COPD, GERD, Anemia, Chronic anticoagulation therapy with hx DVT/PE, lymphoma, Hx SBO admitted on 3/29/2024 due to occlusion of right LE bypass graft.  Pt presented to the ED due to right leg pain.  GI consulted for colonic distention and request to r/o Babar's dz.        Colon distention, sigmoidoscopy to rule out obstruction prior to treating for robson.   AXR 4/3 shows numerous gas distended loops of small bowel and colon.  No free air.  CT  --NG placed.   --Hold Anticoagulation for now  --Hold on sigmoidoscopy now for.    --Given fleet enema.  Miralax thru NG.  Can repeat as needed.  --Repeat flat AXR 4/5: Nonobstructive bowel gas pattern. Gaseous distention of the colon. Enteric feeding tube tip in the fourth portion of the duodenum. Iliac stents, cholecystectomy clips, and endoscopic clips in the right hemiabdomen.   --Likely given watery stool output this is overflow constipation and will treat with bowel regimen as above.  --Consider flex sig/colonoscopy as outpatient or if patient's condition improves.  No indication to proceed today.  OK to start TF from GI standpoint.     S/p Right common femoral artery to mid anterior tibial artery bypass graft acute occlusion  S/p right LE angiogram and lytic therapy 03/30   Acute Blood Loss Anemia  *Occurred despite low-dose Xarelto and Plavix therapy.    - Vascular surgery consult requested -> urgent guillotine right above knee amputation    - IR consulted and following  --Per Vascular surgery consult Dr. Salomon was contacted and s/p right LE angiogram and lytic therapy 03/30 -> 03/31 took the patient to the angio suite to remove the sheath in the left groin  with closure device.   - Hold PTA Xarelto 15 mg nightly.    - Defer resumption of PTA Plavix 75 mg/d to Vascular Surgery and/or IR.   - transfused  PRBCs 03/30 /3/31 and 04/02.   - Follow Hgb. Closely monitor and transfuse for Hgb <7.  - Hgb down to 6.7 No visible GI blood loss Does have noted hematom in R groin. Significant chronic disease. GI will follow along peripherally     Recent Labs   Lab 04/09/24  0636 04/08/24  0631 04/07/24  2058 04/07/24  0628 04/06/24  1830   HGB 6.7* 8.7* 8.0* 8.8* 8.7*       History of SBO   Celiac disease  *Patient reported recent GI work-up revealed she has celiac disease.    - Once diet can be advanced, request no gluten/celiac diet.       Other hospital problems:  Rhabdomyolysis   LIMA  CAD (history of MI x3)  HTN  HLP  History of Takotsubo CM  Type 2 DM  Tobacco use  Diabetic neuropathy  COPD   GERD  Anemia  Recent GI bleed (12/2023 and admitted at [facility])  Spongiotic dermatitis  Chronic anticoagulation therapy with history of DVT/PE  OA  Charcot-Breonna-Tooth foot deformity  Stage VALENTIN marginal zone B-cell lymphoma  MDD with anxiety  Acute Delirium   Contrast dye allergy  Mild hyponatremia  History of SVT    Interval History:    Patient is awake, appears more comfortable, alert to place and self  Requiring HFNC, increased work of breathing but decreased oxygen requirements to 30LPM from 40.  Abdomen is soft, tenderness to palpation of RUQ and RLQ without guarding or rigidity, minimally distended.  Sister present at bedside and updated.              Review of Systems:     C: NEGATIVE for fever, chills, change in weight  E/M: NEGATIVE for ear, mouth and throat problems  R: NEGATIVE for significant cough or SOB  CV: NEGATIVE for chest pain, palpitations or peripheral edema             Medications:   I have reviewed this patient's current medications  Current Facility-Administered Medications   Medication Dose Route Frequency Provider Last Rate Last Admin    - MEDICATION INSTRUCTIONS  for Dialysis Patients -   Does not apply See Admin Instructions Marcin White MD        albuterol (PROVENTIL) neb solution 2.5 mg  2.5 mg Nebulization Q6H Marcin White MD   2.5 mg at 04/09/24 0751    amLODIPine (NORVASC) tablet 2.5 mg  2.5 mg Oral Daily Marcin White MD   2.5 mg at 04/08/24 1147    B and C vitamin Complex with folic acid (NEPHRONEX) liquid 5 mL  5 mL Per Feeding Tube Daily Marcin White MD   5 mL at 04/08/24 1712    betamethasone dipropionate (DIPROSONE) 0.05 % cream   Topical BID Marcin White MD   Given at 04/07/24 2311    carvedilol (COREG) tablet 6.25 mg  6.25 mg Oral BID w/meals Marcin White MD   6.25 mg at 04/08/24 1712    Contraindications to both pharmacological and mechanical prophylaxis (must document contraindications for both in this order)   Does not apply See Admin Instructions Marcin White MD        famotidine (PEPCID) injection 20 mg  20 mg Intravenous Q48H Marcin White MD   20 mg at 04/08/24 0632    fluticasone-vilanterol (BREO ELLIPTA) 200-25 MCG/ACT inhaler 1 puff  1 puff Inhalation Daily Marcin White MD   1 puff at 04/07/24 0847    insulin aspart (NovoLOG) injection (RAPID ACTING)  1-7 Units Subcutaneous TID AC Marcin White MD        insulin aspart (NovoLOG) injection (RAPID ACTING)  1-5 Units Subcutaneous At Bedtime Marcin White MD        nicotine (NICODERM CQ) 14 MG/24HR 24 hr patch 1 patch  1 patch Transdermal Daily Marcin White MD   1 patch at 04/08/24 1148    sodium chloride (PF) 0.9% PF flush 3 mL  3 mL Intracatheter Q8H Marcin White MD   3 mL at 04/09/24 0637                  Physical Exam:   Vitals were reviewed  Vital Signs with Ranges  Temp:  [98  F (36.7  C)-98.4  F (36.9  C)] 98.4  F (36.9  C)  Pulse:  [79-99] 85  Resp:  [17-30] 21  BP: (116-159)/(59-84) 149/69  SpO2:  [90 %-96 %] 94 %  I/O last 3 completed shifts:  In: 925 [P.O.:170; I.V.:755]  Out: 1800 [Other:1800]  General: Patient is awake, appears more comfortable, alert to place and self  Resp: Requiring HFNC,  increased work of breathing  Abdomen is soft, tenderness to palpation of RUQ and RLQ without guarding or rigidity, minimally distended  MSK:Wound vac in place of right knee stump.              Data:   I reviewed the patient's new clinical lab test results.   Recent Labs   Lab Test 04/09/24  0636 04/08/24  0631 04/07/24  2058 10/07/23  1704 10/07/23  0516 10/06/23  1928 10/06/23  0551 10/05/23  1014   WBC 3.9* 6.5 6.7   < > 7.7   < > 9.3  --    HGB 6.7* 8.7* 8.0*   < > 10.0*   < > 10.8*  --    MCV 84 83 83   < > 87   < > 87  --    PLT 48* 55* 38*   < > 120*   < > 177  --    INR  --   --   --   --  1.38*  --  1.08 0.95    < > = values in this interval not displayed.     Recent Labs   Lab Test 04/09/24  0636 04/08/24  0631 04/07/24  1058   POTASSIUM 4.9 4.4 3.6   CHLORIDE 94* 93* 99   CO2 25 24 23   BUN 48.3* 55.8* 37.0*   ANIONGAP 8 10 12     Recent Labs   Lab Test 04/09/24  0636 04/04/24  0541 10/07/23  1950 12/09/22  1444 08/10/21  0833 06/11/20  0810 06/10/20  1243 09/18/19  0739 02/04/19  0935 02/02/19  0615 02/01/19  1118 02/01/19  0845 07/30/18  0923 08/14/17  1028   ALBUMIN 1.8* 2.1* 2.9*   < > 3.6   < > 3.6   < >  --    < >  --  4.3   < > 3.8   BILITOTAL 0.9 0.6 0.2   < > 0.5   < > 0.5   < >  --    < >  --  0.6   < > 0.3   ALT 53* 326* 15   < > 24   < > 30   < >  --    < >  --  75*   < > 37   AST 68* 301* 18   < > 19   < > 21   < >  --    < >  --  39   < > 22   PROTEIN  --   --   --   --   --   --   --   --  Negative  --  Negative  --   --   --    LIPASE  --   --   --   --  132  --  63*  --   --   --   --  101  --  135   AMYLASE  --   --   --   --   --   --   --   --   --   --   --   --   --  46    < > = values in this interval not displayed.       I reviewed the patient's new imaging results.    All laboratory data reviewed  All imaging studies reviewed by me.    Becky Bowden PA-C,  4/5/2024  [facility]  Office : [phone]  Cell: [phone] (Dr. Layne)  Cell: [phone]  (Becky Bowden PA-C)

## 2024-04-09 NOTE — PROGRESS NOTES
Dr. Parnell updated on pt's status. Pt verbalizes wanting to return to her room, VSS, tolerating ice chips, pain is tolerable.  Inquired about a hemoglobin recheck- no new orders. EBL 50 and recheck is scheduled for tomorrow.

## 2024-04-09 NOTE — PROGRESS NOTES
Care Management Follow Up    Length of Stay (days): 11    Expected Discharge Date: 04/10/2024     Concerns to be Addressed:       Patient plan of care discussed at interdisciplinary rounds: Yes    Anticipated Discharge Disposition: Transitional Care, Skilled Nursing Facility     Anticipated Discharge Services:    Anticipated Discharge DME:      Patient/family educated on Medicare website which has current facility and service quality ratings:    Education Provided on the Discharge Plan:    Patient/Family in Agreement with the Plan: yes    Referrals Placed by CM/SW:    Private pay costs discussed: Not applicable    Additional Information:  Writer provided family with more resources for patients spouse from [employer] and State services from the blind. Family completed POA and HCD which will be uploaded shortly.     DAWN Herrera

## 2024-04-09 NOTE — PROGRESS NOTES
Vascular Surgery Post Op Check     Ms. Hendricks seen POD0 s/p RLE completion AKA. Resting comfortably in bed, does report pain in the leg with some relief though not entirely to manageable level with pain medications. Minimal strikethrough on dressing, will CTM.     Will send CBC, BMP, Mg, Phos, given thrombocytopenia and hyponatremia this am, will CTM.     Will increase dose available of IV dilaudid from 0.1-0.2  to 0.2-0.4, will balance patient's mental status with pain control.     Bj Lo MD   Vascular Surgery PGY3

## 2024-04-09 NOTE — OP NOTE
Preoperative diagnosis: Right lower extremity through the knee guillotine amputation.    Postoperative diagnosis: Same.    Procedure: Right lower extremity completion above-the-knee amputation.    Surgeon: José Luis Hernandez M.D     Anesthesia: General.  Estimated blood loss: About 50 mL.  Complications: None.  Drains: None.  Specimen: Remainder of the right knee.    Indication for procedure: This is a 63-year-old female with nonsalvageable right lower extremity who underwent right through the knee guillotine amputation last week.  She is being brought back for completion.    Procedure details: Patient was identified and then taken to the operating room and placed in supine position.  General anesthesia was induced.  Intravenous vancomycin was administered.  The previously placed wound VAC sponge was removed.  Right lower extremity including the open knee joint was prepped in a sterile surgical field was created.    Preprocedure timeout was conducted.  Just above the patella a fishmouth anterior and posterior flap was created.  Incision was deepened sharply with a knife.  All the muscle and fascia were divided.  The periosteum was lifted from the femur and the femur was divided with a Gigli saw.  Adequate hemostasis was confirmed.  Sciatic nerve was pulled down and ligated with Vicryl suture and allowed to retract away.  Wound was irrigated with antibiotic solution.  Fascia was closed with 2-0 Vicryl.  Skin was approximated with vertical mattress 3-0 chromic sutures.  Xeroform dressing was applied.  This was then covered with Curlex and tape.    Consult instruments, sponges and needles were noted to be correct.    Patient was awakened and extubated and taken to recovery room in stable condition.

## 2024-04-09 NOTE — ANESTHESIA PROCEDURE NOTES
Airway       Patient location during procedure: OR       Procedure Start/Stop Times: 4/9/2024 11:12 AM  Staff -        CRNA: Yanira Dhillon APRN CRNA       Performed By: anesthesiologist and CRNA  Consent for Airway        Urgency: elective  Indications and Patient Condition       Indications for airway management: lydia-procedural       Induction type:intravenous       Mask difficulty assessment: 1 - vent by mask    Final Airway Details       Final airway type: endotracheal airway       Successful airway: ETT - single  Endotracheal Airway Details        ETT size (mm): 7.0       Cuffed: yes       Successful intubation technique: video laryngoscopy       VL Blade Size: Glidescope 3       Grade View of Cords: 1       Adjucts: stylet       Position: Right       Measured from: gums/teeth       Secured at (cm): 22       Bite block used: None    Post intubation assessment        Placement verified by: capnometry, equal breath sounds and chest rise        Number of attempts at approach: 1       Number of other approaches attempted: 0       Secured with: tape       Ease of procedure: easy       Dentition: Intact and Unchanged    Medication(s) Administered   Medication Administration Time: 4/9/2024 11:12 AM

## 2024-04-09 NOTE — ANESTHESIA CARE TRANSFER NOTE
Patient: Shirley Hendricks    Procedure: Procedure(s):  COMPLETION RIGHT ABOVE KNEE AMPUTATION       Diagnosis: Arterial occlusion [I70.90]  Diagnosis Additional Information: No value filed.    Anesthesia Type:   General     Note:    Oropharynx: oropharynx clear of all foreign objects and spontaneously breathing  Level of Consciousness: awake  Oxygen Supplementation: face mask  Level of Supplemental Oxygen (L/min / FiO2): 6  Independent Airway: airway patency satisfactory and stable  Dentition: dentition unchanged  Vital Signs Stable: post-procedure vital signs reviewed and stable  Report to RN Given: handoff report given  Patient transferred to: PACU  Comments: Pt to PACU with O2 via mask, airway patent, VSS. Report to RN.  Handoff Report: Identifed the Patient, Identified the Reponsible Provider, Reviewed the pertinent medical history, Discussed the surgical course, Reviewed Intra-OP anesthesia mangement and issues during anesthesia, Set expectations for post-procedure period and Allowed opportunity for questions and acknowledgement of understanding  Vitals:  Vitals Value Taken Time   /106 04/09/24 1203   Temp     Pulse 80 04/09/24 1209   Resp 16 04/09/24 1209   SpO2 98 % 04/09/24 1209   Vitals shown include unfiled device data.    Electronically Signed By: NOELLE Link CRNA  April 9, 2024  12:10 PM

## 2024-04-09 NOTE — PLAN OF CARE
Date & Time: 2830-0489  Surgery/POD#: POD 7 from [facility]  Behavior & Aggression: Green   Fall Risk: Yes   Orientation: A&Ox4   ABNL VS/O2:VSS on RA   Pain Management: Denied need for pain meds   Bowel/Bladder: On HD, one small loose BM during shift   Drains: NJ tube clamped. PICC infusing D10 half NS @ 15ml/hr   Wounds/incisions: R knee wound vac @ 125 continuous, R thigh bruise. L groin and R abd site CDI   Diet:Renal diet, NPO at midnight   Activity Level: Ax2 lift, T/R   Tests/Procedures: Procedure tomorrow AM   Anticipated  DC Date: Pending

## 2024-04-09 NOTE — PROGRESS NOTES
[facility]    Medicine Progress Note - Hospitalist Service    Date of Admission:  3/29/2024  Date of Service: 04/09/2024    Assessment & Plan     Shirley Hendricks is a 63 year old female admitted on 3/29/2024 due to occlusion of right LE bypass graft.      Past medical history significant for PAD/PVD, Tobacco use D/O, CAD (history of MI x3), HTN, HLP, DM2, Neuropathy, COPD, GERD, Anemia, Spongiotic dermatitis, MDD with anxiety, Chronic anticoagulation therapy with history of DVT/PE, OA, Charcot-Breonna-Tooth foot deformity, Marginal zone B-cell lymphoma, History of SBO, History of Takotsubo CM, History of SVT.    Discussed with Dr. Mazariegos and reviewed ED notes and Vascular Surgery consult note.  Patient presented to the ED due to right leg pain that had begun ~ 1 hour prior to presentation.  She reported pain seems to worsen with walking and when she attempted to check a pulse in her leg it was absent.  She also described that the foot is colder than the left.      While in the lobby attempts to find right pedal pulse with doppler were unsuccessful.  Dr. Lozano of Vascular Surgery was contacted by the patient as well as Dr. Mazariegos.      Work-up completed while patient was in the lobby included right arterial LE US revealed the right common femoral artery to mid anterior tibial artery bypass graft occlusion.      After assessing patient labs were collected and included a CMP that revealed a sodium of 132, chloride of 97, CO2 of 21 and glucose of 103 otherwise within normal limits.  CBC with differential revealed an RDW of 15.7 otherwise unremarkable.    Right common femoral artery to mid anterior tibial artery bypass graft acute occlusion  PAD/PVD  Acute Blood Loss Anemia  *Occurred despite low-dose Xarelto and Plavix therapy.    - Vascular surgery consult requested -> urgent guillotine right above knee amputation today followed by close monitoring in the ICU.    - Tentative plan to  return to OR today for formalization of AKA with Dr. Diaz   - IR was consulted and following  --Per Vascular surgery consult Dr. Salomon was contacted and s/p right LE angiogram and lytic therapy 03/30 -> 03/31 took the patient to the angio suite to remove the sheath in the left groin with closure device.   --IR now signed off  - Hold PTA Xarelto 15 mg nightly.    - Defer resumption of PTA Plavix 75 mg/d to Vascular Surgery and/or IR -> plavix to restart once platelets improve, continue to monitor bypass in LLE   - Statin and antihypertensive management as below.    - Pain management as needed  - Follow Hgb   - transfused 1 U PRBCs 03/30 / 03/31 and 04/02 and 04/06 and 04/09 and closely monitor.  - CBC Q12H, transfuse as needed Hgb < 7.0    Rhabdomyolysis   ARF  Assessment: CPK has been rising. Her serum creatinine and body urea nitrogen also rising. Renal US -> No obstruction demonstrated.   Plan:  - Follow CK / BMP daily -> Cr trending down  - Nephrology following -> now on HD, Decreased BP following dilaudid for pain and 25% albumin ordered with improvement in BPs  - Avoid NSAIDs / nephrotoxins    Colon distention  Abdominal Pain  Assessment:CT abdomen 04/03 -> Medium-sized right retroperitoneal and extraperitoneal hematoma. Evaluation for extravasation is unable to be performed without contrast. This is likely similar to slightly smaller than the CT lumbar spine although this is incompletely visualized at   that time  Extensive pulmonary opacities. Differential: Multifocal infection, ARDS, and/or pulmonary  Plan:  --NG placed -> remove in coming days as GI issues resolve  --Can stop TFs -> Dialysis diet  --Hold Anticoagulation for now given anemia  --GI following ->  consider flex sig/colonoscopy as outpatient.  --Repeat flat AXR 4/5: Nonobstructive bowel gas pattern. Gaseous distention of the colon. Enteric feeding tube tip in the fourth portion of the duodenum. Iliac stents, cholecystectomy clips, and  endoscopic clips in the right hemiabdomen.   --Continue to monitor  --Pain control as needed    MDD with anxiety  Acute Delirium   *Noted on chart review.  No interventions.    Delirium from acute illness  Plan:  Delirium has mostly resolved  Continue to maintain delirium precautions.   IM Zyprexa as needed    Contrast dye allergy  - Ordered solumedrol and benadryl per contrast dye allergy protocol.      Mild hyponatremia  - BMP daily    Tobacco Use D/O   Patient currently smokes max 5 cigarettes per day.     - Counseled regarding smoking cessation that should also continue with PCP.    - Nicoderm patch ordered.    Recent celiac disease  *Patient reported recent GI work-up revealed she has celiac disease.    - Once diet can be advanced would request no gluten/celiac diet.      CAD (history of MI x3)  HTN  HLP  *Last coronary angiogram completed 10/2023.    - Resumed on PTA Coreg 6.25 mg BID, hold lisinopril 10 mg/d due to LIMA, continue Norvasc 2.5 mg/d.  Hold parameters in place.    - hold PTA rosuvastatin 40 mg/d due to acute rhabdo  - PRN IV hydralazine 10 mg every 4 hours for SBP GREATER THAN 180.      History of Takotsubo CM  *Recovered EF (55-60%) from ECHO 11/2023.    - Resumed on PTA Coreg 6.25 mg BID (hold for SBP < 110), hold lisinopril 10 mg/d and  hold Jardiance 10 mg/d For now given ARF    Type 2 DM  Diabetic neuropathy  *Noted diagnosis on chart review.  However, A1c of 5.2%.    - Hold PTA Jardiance 10 mg/d and gabapentin 100 mg TID due to AMS.  - No insulin as borderline hypoglycemic    COPD   - Resumed on PTA inhalers.    - Encourage use of Flutter valve/IS.      GERD  - Resumed on PTA omeprazole 20 mg/d.      Anemia  Recent GI bleed (12/2023 and admitted at [facility])  - Hold PTA Iron supplements and resume at discharge.    - CBC daily      Spongiotic dermatitis  *Recently seen at outpatient Derm.    - Resumed on PTA betamethasone cream BID.      Chronic anticoagulation therapy with history of  DVT/PE  - Hold PTA Xarelto.      OA  - Pain management as above.      Charcot-Breonna-Tooth foot deformity  *Noted on chart review.  No interventions.     Stage VALENTIN marginal zone B-cell lymphoma  *Follows with [facility] (Dr. Barrow).    - Continue outpatient surveillance (CT scan in 8/2024).      History of SBO  *Noted on chart review.  No interventions.    History of SVT  - Resumed on PTA Coreg as above.            Diet: Advance Diet as Tolerated: Clear Liquid Diet  Calorie Counts  Snacks/Supplements Adult: Ensure Clear; Between Meals  Snacks/Supplements Adult: Gelatein Plus; Between Meals    DVT Prophylaxis: Pneumatic Compression Devices  Alvarez Catheter: Not present  Lines: PRESENT      PICC 03/31/24 Double Lumen Right Brachial vein medial access-Site Assessment: WDL  CVC Double Lumen Right Internal jugular Tunneled-Site Assessment:  (use for dialysis only per Dr Hernandez)      Cardiac Monitoring: None  Code Status: Full Code      Clinically Significant Risk Factors         # Hyponatremia: Lowest Na = 127 mmol/L in last 2 days, will monitor as appropriate    # Hypomagnesemia: Lowest Mg = 1.5 mg/dL in last 2 days, will replace as needed   # Hypoalbuminemia: Lowest albumin = 1.8 g/dL at 4/9/2024  6:36 AM, will monitor as appropriate   # Thrombocytopenia: Lowest platelets = 38 in last 2 days, will monitor for bleeding   # Hypertension: Noted on problem list         # Severe Malnutrition: based on nutrition assessment    # Financial/Environmental Concerns:           Disposition Plan      Expected Discharge Date: 04/12/2024        Discharge Comments: TF, R amp, PT/OT  Neph=dilaysis  Wound Vac          Marcin White MD  Hospitalist Service  [facility]  Securely message with Gerard (more info)  Text page via [facility] Paging/Directory   ______________________________________________________________________    Interval History     No acute events overnight  Abdominal pain essentially resolved  Hgb  down again today needing transfusion  Main complaint post op pain today  No fevers  No new CP/SOB   No nausea / vomiting  No new complaints otherwise    Physical Exam   Vital Signs: Temp: 97.9  F (36.6  C) Temp src: Oral BP: (!) 202/82 Pulse: 86   Resp: 10 SpO2: (P) 97 % O2 Device: Nasal cannula Oxygen Delivery: (P) 3 LPM  Weight: 118 lbs 13.25 oz      Constitutional: Awake, alert, no apparent distress.    Pulmonary: CTABL  Cardiovascular: Regular rate and rhythm, normal S1 and S2, no S3 or S4, and no murmur noted.  GI: Normal bowel sounds, soft, non-distended, non-tender.    Neuro: Fahad but has sensory loss and is not able to wiggle toes but is able to plantarflex and dorsiflex.   Psych:  Alert and oriented x3. Fahad. Normal affect.  Extremities: Wound VAC intact right open AKA   ----------------------------------------------------------------------------------------    Medical Decision Making       50 MINUTES SPENT BY ME on the date of service doing chart review, history, exam, documentation & further activities per the note.      Data   ------------------------- PAST 24 HR DATA REVIEWED -----------------------------------------------    I have personally reviewed the following data over the past 24 hrs:    3.9 (L)  \   6.7 (LL)   / 48 (LL)     127 (L) 94 (L) 48.3 (H) /  86   4.9 25 2.66 (H) \     ALT: 53 (H) AST: 68 (H) AP: 155 (H) TBILI: 0.9   ALB: 1.8 (L) TOT PROTEIN: 3.9 (L) LIPASE: N/A       Imaging results reviewed over the past 24 hrs:   No results found for this or any previous visit (from the past 24 hour(s)).    ------------------------- ENCOUNTER LABS ----------------------------------------------------------------  Recent Labs   Lab 04/09/24  1212 04/09/24  0636 04/09/24  0612 04/08/24  1200 04/08/24  0631 04/07/24  2132 04/07/24  2058 04/07/24  1140 04/07/24  1058 04/04/24  0805 04/04/24  0541   WBC  --  3.9*  --   --  6.5  --  6.7  --   --    < > 9.6   HGB  --  6.7*  --   --  8.7*  --  8.0*  --   --     < > 7.8*   MCV  --  84  --   --  83  --  83  --   --    < > 82   PLT  --  48*  --   --  55*  --  38*  --   --    < > 54*   NA  --  127*  --   --  127*  --   --   --  134*   < > 136   POTASSIUM  --  4.9  --   --  4.4  --   --   --  3.6   < > 4.1   CHLORIDE  --  94*  --   --  93*  --   --   --  99   < > 98   CO2  --  25  --   --  24  --   --   --  23   < > 25   BUN  --  48.3*  --   --  55.8*  --   --   --  37.0*   < > 55.3*   CR  --  2.66*  --   --  2.85*  --   --   --  2.16*   < > 3.13*   ANIONGAP  --  8  --   --  10  --   --   --  12   < > 13   SONIA  --  7.1*  --   --  7.0*  --   --   --  6.9*   < > 6.9*   GLC 86 78 80   < > 123*   < >  --    < > 118*   < > 103*   ALBUMIN  --  1.8*  --   --   --   --   --   --   --   --  2.1*   PROTTOTAL  --  3.9*  --   --   --   --   --   --   --   --  4.2*   BILITOTAL  --  0.9  --   --   --   --   --   --   --   --  0.6   ALKPHOS  --  155*  --   --   --   --   --   --   --   --  192*   ALT  --  53*  --   --   --   --   --   --   --   --  326*   AST  --  68*  --   --   --   --   --   --   --   --  301*    < > = values in this interval not displayed.       Most Recent 3 CBC's:  Recent Labs   Lab Test 04/09/24  0636 04/08/24 0631 04/07/24 2058   WBC 3.9* 6.5 6.7   HGB 6.7* 8.7* 8.0*   MCV 84 83 83   PLT 48* 55* 38*     Most Recent 3 BMP's:  Recent Labs   Lab Test 04/09/24  1212 04/09/24  0636 04/09/24  0612 04/08/24  1200 04/08/24  0631 04/07/24  1140 04/07/24  1058   NA  --  127*  --   --  127*  --  134*   POTASSIUM  --  4.9  --   --  4.4  --  3.6   CHLORIDE  --  94*  --   --  93*  --  99   CO2  --  25  --   --  24  --  23   BUN  --  48.3*  --   --  55.8*  --  37.0*   CR  --  2.66*  --   --  2.85*  --  2.16*   ANIONGAP  --  8  --   --  10  --  12   SONIA  --  7.1*  --   --  7.0*  --  6.9*   GLC 86 78 80   < > 123*   < > 118*    < > = values in this interval not displayed.     Most Recent 2 LFT's:  Recent Labs   Lab Test 04/09/24  0636 04/04/24  0541   AST 68* 301*   ALT 53* 326*    ALKPHOS 155* 192*   BILITOTAL 0.9 0.6     Most Recent 3 INR's:  Recent Labs   Lab Test 10/07/23  0516 10/06/23  0551 10/05/23  1014   INR 1.38* 1.08 0.95     Most Recent 3 Troponin's:  Recent Labs   Lab Test 06/10/20  1243 02/16/20  1824 09/18/19  0739   TROPI <0.015 <0.015 <0.015     Most Recent 3 BNP's:No lab results found.  Most Recent D-dimer:  Recent Labs   Lab Test 08/13/21  0934   DD 10.38*     Most Recent Cholesterol Panel:  Recent Labs   Lab Test 10/03/23  0941   CHOL 137   LDL 69   HDL 54   TRIG 68     Most Recent 6 Bacteria Isolates From Any Culture (See EPIC Reports for Culture Details):No lab results found.  Most Recent TSH and T4:  Recent Labs   Lab Test 01/05/21  1119 02/04/19  0934 03/28/17  0922   TSH 0.77   < > 1.37   T4  --   --  1.19    < > = values in this interval not displayed.     Most Recent Urinalysis:  Recent Labs   Lab Test 02/04/19  0935   COLOR Yellow   APPEARANCE Clear   URINEGLC Negative   URINEBILI Negative   URINEKETONE Negative   SG 1.010   UBLD Negative   URINEPH 6.0   PROTEIN Negative   UROBILINOGEN 0.2   NITRITE Negative   LEUKEST Negative   RBCU O - 2   WBCU 0 - 5     Most Recent ESR & CRP:  Recent Labs   Lab Test 11/13/23  1705   SED 39*

## 2024-04-09 NOTE — PLAN OF CARE
Goal Outcome Evaluation: Date & Time:  4/9/24 8035-3325  Surgery/POD#: POD 7 from R AKA, POD #0 R AKA closure  Behavior & Aggression: Green   Fall Risk: Yes   Orientation: A&Ox4   ABNL VS/O2:VSS on 3L NC- CAPNO WDL  Pain Management: Oxycodone, IV Dilaudid.  Abn labs: Hgb 6.7, Plt 48, Creat 2.6, Mag 1.5   Bowel/Bladder: On HD, x2 small/medium loose BM during shift   Drains: NJ tube clamped. RUE double lumen PICC infusing NS @ 1Oml/hr   Wounds/incisions: R thigh bruise. L groin and R abd site CDI   Diet:clears, TF discontinued   Activity Level: Ax2 lift, T/R, PIP measures implemented    Tests/Procedures: Procedure tomorrow AM   Anticipated  DC Date: Pending   A/O x2, int confusion, more so post surgery. Improving this evening. Mag replaced(re-check 2000) 1U PRBCS and 1U platelets administered prior to surgery this AM (Hgb recheck this devendra). Right HD port CDI. R AKA elevated, dressing shadowed and marked; ice for comfort. Mepilex to sacrum for blanchable redness.      Plan of Care Reviewed With: patient

## 2024-04-09 NOTE — PROGRESS NOTES
CLINICAL NUTRITION SERVICES - REASSESSMENT NOTE      RECOMMENDATIONS FOR MD/PROVIDER TO ORDER:   May need to consider replacing FT for supplemental nutrition if pt unable to meet >60% of estimated nutrition needs      Recommendations Ordered by Registered Dietitian (RD):   Ordered kcal ct per vascular surgery note   TF discontinued by hospitalist today   RD discontinued FWF w/ FT pulled   Discontinued nephronex w/ FT pulled. Switched to nephrocap  Ordered oral nutrition supplements for pt to trial while on CLD-- cherry Gel+, apple Ensure Clear  Ordered BID Rigo for surgical wound healing     Malnutrition:   % Weight Loss:  Weight loss does not meet criteria for malnutrition (4/4)  % Intake:  </= 50% for >/= 5 days (severe malnutrition) (4/4)  Subcutaneous Fat Loss:  Orbital region moderate depletion and Upper arm region moderate depletion (4/4)  Muscle Loss:  Temporal region moderate-severe depletion and Clavicle bone region moderate depletion (4/4)  Fluid Retention:  Trace to moderate generalized edema     Malnutrition Diagnosis: Severe malnutrition in the context of --  Acute illness or injury  Chronic illness or disease         EVALUATION OF PROGRESS TOWARD GOALS   Diet: Advance Diet as Tolerated: Clear Liquid Diet      3/29: CLD --> NPO  3/31: Regular diet   4/1: NPO --> CLD  4/2: NPO --> Regular diet   4/3: NPO  4/8: Renal/dialysis diet  4/9: NPO --> CLD (ADAT)    Intake/Tolerance:  Unable to visit w/ pt today- has been in OR  Vascular surgery note today = Very malnourished. Can now take po--add supplements.   Received first meal yesterday, cherry jello per health [facility]. Bites documented per nursing flow sheet.    Nutrition Support: Enteral  Type of Feeding Tube: Nasoduodenal   Enteral Frequency: Continuous   Enteral Regimen: Osmolite 1.5 @ 40 mL/hr   Total Enteral Provisions: 1440 kcal (30 kcal/kg), 60 g protein (1.3 g/kg), 196 g CHO, 0 g fiber, 732 mL free water   Free Water Flush: 60 mL q 4 hours   Total  Free Water Provisions: 1092 mL    4/5: TF started  4/5: abd XR = Nonobstructive bowel gas pattern. Gaseous distention of the colon. Enteric feeding tube tip in the fourth portion of the duodenum. Iliac stents, cholecystectomy clips, and endoscopic clips in the right hemiabdomen.   4/6: reached goal (as above)  4/9: discontinued by Dr. White. NPO since [place] for surgery   Vascular surgery note today = will need calorie counts prior to NJ tube removal       ASSESSED NUTRITION NEEDS:  Dosing Weight: 48 kg (3/29)  Estimated Energy Needs: 2191-8387 kcal (25-30 Kcal/Kg)  Justification: maintenance  Estimated Protein Needs: 55-75 grams protein (1.2-1.5 g pro/Kg)  Justification: hypercatabolism with acute illness and dialysis, post-op  Estimated Fluid Needs: 1 mL/Kcal  Justification: maintenance        NEW FINDINGS:   General: completion of right AKA today by vascular surgery     Weight: no significant wt loss suspected this admit. Some wt shifts expected w/ dialysis and bed-scale wt method   Date/Time Weight Weight Method   04/09/24 0650 53.9 kg (118 lb 13.3 oz) Bed scale   04/07/24 0633 51.7 kg (113 lb 15.7 oz) Bed scale   04/06/24 0635 52.6 kg (115 lb 15.4 oz) Bed scale   04/04/24 0600 52.9 kg (116 lb 10 oz) Bed scale   04/01/24 0600 55.2 kg (121 lb 12.8 oz) Bed scale   03/31/24 0600 52.2 kg (115 lb 1.6 oz) Bed scale   03/30/24 0600 47.9 kg (105 lb 9.6 oz) Bed scale   03/29/24 1830 48 kg (105 lb 14.4 oz) --   03/29/24 1431 49.9 kg (110 lb)      I/O: Net I/O Since Admission: 4,806.4 mL [04/09/24 0801].  Stool output = 3x BM yesterday, 2x on 4/7, 4x on 4/6, 3x on 3/5, 7x on 4/4    Labs: reviewed   Component Value Date    (L) 04/09/2024   BUN 48.3 (H) 04/09/2024   CR 2.66 (H) 04/09/2024   MAG 1.5 (L) 04/09/2024   ALKPHOS 155 (H) 04/09/2024   ALT 53 (H) 04/09/2024   AST 68 (H) 04/09/2024     Lab 04/09/24  0636 04/09/24  0612 04/09/24  0154 04/08/24  2116 04/08/24  1608 04/08/24  1200   GLC 78 80 80 93 130* 128*       Medications: reviewed   Medication Dose Route Frequency Provider Last Admin    B and C vitamin Complex with folic acid (NEPHRONEX) liquid 5 mL  5 mL Per Feeding Tube Daily Marcin White MD 5 mL at 04/08/24 1712    famotidine (PEPCID) injection 20 mg  20 mg Intravenous Q48H Marcin White MD 20 mg at 04/08/24 0632    insulin aspart (NovoLOG) injection (RAPID ACTING)  1-7 Units Subcutaneous TID AC Marcin White MD      insulin aspart (NovoLOG) injection (RAPID ACTING)  1-5 Units Subcutaneous At Bedtime Marcin White MD       Resp.: currently requiring 2LPM nasal cannula         Previous Goals:   Goal TF regimen will meet % needs while NPO   Evaluation: Met    Previous Nutrition Diagnosis:   Inadequate protein-energy intake related to NPO with plans to start TF (pending GI eval) as evidenced by meeting 0% needs   Evaluation: Improving        MALNUTRITION  % Weight Loss:  Weight loss does not meet criteria for malnutrition (4/4)  % Intake:  </= 50% for >/= 5 days (severe malnutrition) (4/4)  Subcutaneous Fat Loss:  Orbital region moderate depletion and Upper arm region moderate depletion (4/4)  Muscle Loss:  Temporal region moderate-severe depletion and Clavicle bone region moderate depletion (4/4)  Fluid Retention:  Trace to moderate generalized edema     Malnutrition Diagnosis: Severe malnutrition in the context of --  Acute illness or injury  Chronic illness or disease        CURRENT NUTRITION DIAGNOSIS  Inadequate oral intake related to variable diets w/ recent need for TF as evidenced by current CLD, TF discontinued today, need for oral nutrition supplements        INTERVENTIONS  Recommendations / Nutrition Prescription  Ordered kcal ct per vascular surgery note   TF discontinued by hospitalist today   RD discontinued FWF w/ FT pulled   Discontinued nephronex w/ FT pulled. Switched to nephrocap  Ordered oral nutrition supplements for pt to trial while on CLD-- cherry Gel+, apple Ensure Clear  Ordered BID Rigo  for surgical wound healing      Implementation  Collaboration with other nutrition professionals  Medical food supplement therapy  Multivitamin/mineral    Goals  Diet to advance past CLD w/in 24 hours  Pt to meet >/= 60% of estimated nutrition needs via PO intake        MONITORING AND EVALUATION:  Progress towards goals will be monitored and evaluated per protocol and Practice Guidelines      Keiko Briceno RD, LD  Pager: [phone]

## 2024-04-09 NOTE — PROGRESS NOTES
"VASCULAR SURGERY PROGRESS NOTE    Subjective:  No acute events overnight, appears comfortable     Objective:  Intake/Output Summary (Last 24 hours) at 4/9/2024 0738  Last data filed at 4/9/2024 0600  Gross per 24 hour   Intake 925 ml   Output 1800 ml   Net -875 ml     PHYSICAL EXAM:  /68 (BP Location: Left arm)   Pulse 83   Temp 98.4  F (36.9  C) (Oral)   Resp 21   Ht 1.549 m (5' 1\")   Wt 53.9 kg (118 lb 13.3 oz)   LMP  (LMP Unknown)   SpO2 92%   BMI 22.45 kg/m    General: The patient is alert, continues to appear pleasantly confused. Appropriate. No acute distress  Psych: pleasant affect, unable to discuss operative plans for today  Skin: Color appropriate for race, warm, dry.  Respiratory: The patient does not require supplemental oxygen. Breathing unlabored  GI:  Abdomen soft, nontender to light palpation.  Extremities: 2+ CFA pulse bilaterally. Right thigh with stable bruising and superficial blistering on lateral leg. Wound vac in place good seal. 2+ graft pulse left leg.     Labs:  Na 127  Cr 2.66 on HD  BUN 48.3  Albumin 1.8  Hgb 6.7 from 8.7  Platelets 48 from 55, HIT neg      Imaging:   No new imaging    ASSESSMENT:  Shirley Hendricks is a 63 year old female who underwent an emergent right AKA due to graft failure on 4/1, now requiring hemodialysis due to LIMA secondary to rhabdo, plans for completion right AKA today      PLAN:  -NPO since [place]  -AM labs  -1 U PRBC and platelets to transfuse this AM  -hyponatremia noted- cont to monitor. No IV fluids at this time due to risk of fluid overload pre op  -last HD on 4/8, planning for HD tomorrow 4/10  -will need calorie counts prior to NJ tube removal  -further plan pending OR course  -platelets 48, cont to hold antiplatelet at this time but anticipate discharge on plavix  -hypoalbuminemia noted- will need protein supplementation when resuming PO  -appreciate medical team assistance with care    Discussed pt history, exam, assessment and plan with Dr." Mary of the vascular surgery service, who is in agreement with the above.    Gala Morales,   VASCULAR SURGERY     STAFF:  As above.  Hgb=6.7  Note no change in retroperitoneal hematoma on 4/3/24 CT scan from time of Angio.  No GI bleeding.    Oliguric LIMA--on dialysis    Very malnourished. Can now take po--add supplements.     Completion AKA with Dr Hernandez today.      Chadwick Lozano MD

## 2024-04-09 NOTE — ANESTHESIA POSTPROCEDURE EVALUATION
Patient: Shirley Hendricks    Procedure: Procedure(s):  COMPLETION RIGHT ABOVE KNEE AMPUTATION       Anesthesia Type:  General    Note:  Disposition: Inpatient   Postop Pain Control: Uneventful            Sign Out: Well controlled pain   PONV: No   Neuro/Psych: Uneventful            Sign Out: Acceptable/Baseline neuro status   Airway/Respiratory: Uneventful            Sign Out: Acceptable/Baseline resp. status   CV/Hemodynamics: Uneventful            Sign Out: Acceptable CV status   Other NRE: NONE   DID A NON-ROUTINE EVENT OCCUR? No           Last vitals:  Vitals Value Taken Time   /86 04/09/24 1320   Temp 36.7  C (98  F) 04/09/24 1250   Pulse 77 04/09/24 1328   Resp 10 04/09/24 1328   SpO2 95 % 04/09/24 1328       Electronically Signed By: Vasquez Parnell MD  April 9, 2024  4:29 PM

## 2024-04-10 ENCOUNTER — APPOINTMENT (OUTPATIENT)
Dept: SPEECH THERAPY | Facility: CLINIC | Age: 64
DRG: 270 | End: 2024-04-10
Payer: COMMERCIAL

## 2024-04-10 ENCOUNTER — APPOINTMENT (OUTPATIENT)
Dept: PHYSICAL THERAPY | Facility: CLINIC | Age: 64
DRG: 270 | End: 2024-04-10
Payer: COMMERCIAL

## 2024-04-10 LAB
ANION GAP SERPL CALCULATED.3IONS-SCNC: 11 MMOL/L (ref 7–15)
ANION GAP SERPL CALCULATED.3IONS-SCNC: 11 MMOL/L (ref 7–15)
ANION GAP SERPL CALCULATED.3IONS-SCNC: 13 MMOL/L (ref 7–15)
BUN SERPL-MCNC: 34.8 MG/DL (ref 8–23)
BUN SERPL-MCNC: 60.5 MG/DL (ref 8–23)
BUN SERPL-MCNC: 66.8 MG/DL (ref 8–23)
CALCIUM SERPL-MCNC: 7.5 MG/DL (ref 8.8–10.2)
CALCIUM SERPL-MCNC: 7.6 MG/DL (ref 8.8–10.2)
CALCIUM SERPL-MCNC: 7.7 MG/DL (ref 8.8–10.2)
CHLORIDE SERPL-SCNC: 89 MMOL/L (ref 98–107)
CHLORIDE SERPL-SCNC: 91 MMOL/L (ref 98–107)
CHLORIDE SERPL-SCNC: 94 MMOL/L (ref 98–107)
CORTIS SERPL-MCNC: 8.2 UG/DL
CREAT SERPL-MCNC: 2.09 MG/DL (ref 0.51–0.95)
CREAT SERPL-MCNC: 3.07 MG/DL (ref 0.51–0.95)
CREAT SERPL-MCNC: 3.2 MG/DL (ref 0.51–0.95)
DEPRECATED HCO3 PLAS-SCNC: 20 MMOL/L (ref 22–29)
DEPRECATED HCO3 PLAS-SCNC: 22 MMOL/L (ref 22–29)
DEPRECATED HCO3 PLAS-SCNC: 25 MMOL/L (ref 22–29)
EGFRCR SERPLBLD CKD-EPI 2021: 15 ML/MIN/1.73M2
EGFRCR SERPLBLD CKD-EPI 2021: 16 ML/MIN/1.73M2
EGFRCR SERPLBLD CKD-EPI 2021: 26 ML/MIN/1.73M2
FERRITIN SERPL-MCNC: 609 NG/ML (ref 11–328)
GLUCOSE BLDC GLUCOMTR-MCNC: 111 MG/DL (ref 70–99)
GLUCOSE BLDC GLUCOMTR-MCNC: 78 MG/DL (ref 70–99)
GLUCOSE BLDC GLUCOMTR-MCNC: 79 MG/DL (ref 70–99)
GLUCOSE BLDC GLUCOMTR-MCNC: 86 MG/DL (ref 70–99)
GLUCOSE BLDC GLUCOMTR-MCNC: 92 MG/DL (ref 70–99)
GLUCOSE SERPL-MCNC: 107 MG/DL (ref 70–99)
GLUCOSE SERPL-MCNC: 79 MG/DL (ref 70–99)
GLUCOSE SERPL-MCNC: 90 MG/DL (ref 70–99)
IRON BINDING CAPACITY (ROCHE): 124 UG/DL (ref 240–430)
IRON SATN MFR SERPL: 18 % (ref 15–46)
IRON SERPL-MCNC: 22 UG/DL (ref 37–145)
OSMOLALITY SERPL: 290 MMOL/KG (ref 280–301)
OSMOLALITY UR: 321 MMOL/KG (ref 100–1200)
PATH REPORT.COMMENTS IMP SPEC: NORMAL
PATH REPORT.COMMENTS IMP SPEC: NORMAL
PATH REPORT.FINAL DX SPEC: NORMAL
PATH REPORT.GROSS SPEC: NORMAL
PATH REPORT.MICROSCOPIC SPEC OTHER STN: NORMAL
PATH REPORT.RELEVANT HX SPEC: NORMAL
PHOTO IMAGE: NORMAL
POTASSIUM SERPL-SCNC: 4.6 MMOL/L (ref 3.4–5.3)
POTASSIUM SERPL-SCNC: 5.7 MMOL/L (ref 3.4–5.3)
POTASSIUM SERPL-SCNC: 5.9 MMOL/L (ref 3.4–5.3)
SODIUM SERPL-SCNC: 122 MMOL/L (ref 135–145)
SODIUM SERPL-SCNC: 124 MMOL/L (ref 135–145)
SODIUM SERPL-SCNC: 130 MMOL/L (ref 135–145)
SODIUM UR-SCNC: 70 MMOL/L
TSH SERPL DL<=0.005 MIU/L-ACNC: 2.53 UIU/ML (ref 0.3–4.2)

## 2024-04-10 PROCEDURE — 258N000003 HC RX IP 258 OP 636: Performed by: INTERNAL MEDICINE

## 2024-04-10 PROCEDURE — 120N000001 HC R&B MED SURG/OB

## 2024-04-10 PROCEDURE — 36415 COLL VENOUS BLD VENIPUNCTURE: CPT | Performed by: STUDENT IN AN ORGANIZED HEALTH CARE EDUCATION/TRAINING PROGRAM

## 2024-04-10 PROCEDURE — 250N000013 HC RX MED GY IP 250 OP 250 PS 637: Performed by: STUDENT IN AN ORGANIZED HEALTH CARE EDUCATION/TRAINING PROGRAM

## 2024-04-10 PROCEDURE — 999N000157 HC STATISTIC RCP TIME EA 10 MIN

## 2024-04-10 PROCEDURE — 83935 ASSAY OF URINE OSMOLALITY: CPT | Performed by: HOSPITALIST

## 2024-04-10 PROCEDURE — 99407 BEHAV CHNG SMOKING > 10 MIN: CPT | Performed by: PHYSICAL THERAPIST

## 2024-04-10 PROCEDURE — 250N000011 HC RX IP 250 OP 636: Performed by: INTERNAL MEDICINE

## 2024-04-10 PROCEDURE — 250N000011 HC RX IP 250 OP 636

## 2024-04-10 PROCEDURE — 99233 SBSQ HOSP IP/OBS HIGH 50: CPT | Performed by: STUDENT IN AN ORGANIZED HEALTH CARE EDUCATION/TRAINING PROGRAM

## 2024-04-10 PROCEDURE — 90937 HEMODIALYSIS REPEATED EVAL: CPT

## 2024-04-10 PROCEDURE — 80048 BASIC METABOLIC PNL TOTAL CA: CPT | Performed by: STUDENT IN AN ORGANIZED HEALTH CARE EDUCATION/TRAINING PROGRAM

## 2024-04-10 PROCEDURE — 84443 ASSAY THYROID STIM HORMONE: CPT | Performed by: HOSPITALIST

## 2024-04-10 PROCEDURE — 94640 AIRWAY INHALATION TREATMENT: CPT | Mod: 76

## 2024-04-10 PROCEDURE — 83930 ASSAY OF BLOOD OSMOLALITY: CPT | Performed by: HOSPITALIST

## 2024-04-10 PROCEDURE — 250N000011 HC RX IP 250 OP 636: Performed by: STUDENT IN AN ORGANIZED HEALTH CARE EDUCATION/TRAINING PROGRAM

## 2024-04-10 PROCEDURE — 82533 TOTAL CORTISOL: CPT | Performed by: STUDENT IN AN ORGANIZED HEALTH CARE EDUCATION/TRAINING PROGRAM

## 2024-04-10 PROCEDURE — 83550 IRON BINDING TEST: CPT | Performed by: INTERNAL MEDICINE

## 2024-04-10 PROCEDURE — 250N000009 HC RX 250: Performed by: STUDENT IN AN ORGANIZED HEALTH CARE EDUCATION/TRAINING PROGRAM

## 2024-04-10 PROCEDURE — 634N000001 HC RX 634: Mod: JZ | Performed by: INTERNAL MEDICINE

## 2024-04-10 PROCEDURE — 92610 EVALUATE SWALLOWING FUNCTION: CPT | Mod: GN

## 2024-04-10 PROCEDURE — 90935 HEMODIALYSIS ONE EVALUATION: CPT | Performed by: INTERNAL MEDICINE

## 2024-04-10 PROCEDURE — 84300 ASSAY OF URINE SODIUM: CPT | Performed by: HOSPITALIST

## 2024-04-10 PROCEDURE — 97530 THERAPEUTIC ACTIVITIES: CPT | Mod: GP | Performed by: PHYSICAL THERAPIST

## 2024-04-10 RX ORDER — LIDOCAINE 40 MG/G
CREAM TOPICAL
Status: DISCONTINUED | OUTPATIENT
Start: 2024-04-10 | End: 2024-04-22 | Stop reason: HOSPADM

## 2024-04-10 RX ORDER — ALBUMIN (HUMAN) 12.5 G/50ML
50 SOLUTION INTRAVENOUS
Status: DISCONTINUED | OUTPATIENT
Start: 2024-04-10 | End: 2024-04-10

## 2024-04-10 RX ORDER — SODIUM CHLORIDE 1 G/1
2 TABLET ORAL
Status: DISCONTINUED | OUTPATIENT
Start: 2024-04-10 | End: 2024-04-12

## 2024-04-10 RX ORDER — HYDROMORPHONE HYDROCHLORIDE 2 MG/1
2 TABLET ORAL EVERY 4 HOURS PRN
Status: DISCONTINUED | OUTPATIENT
Start: 2024-04-10 | End: 2024-04-10

## 2024-04-10 RX ORDER — HYDROMORPHONE HYDROCHLORIDE 2 MG/1
2 TABLET ORAL EVERY 4 HOURS PRN
Status: DISCONTINUED | OUTPATIENT
Start: 2024-04-10 | End: 2024-04-22 | Stop reason: HOSPADM

## 2024-04-10 RX ORDER — HYDROMORPHONE HYDROCHLORIDE 2 MG/1
4 TABLET ORAL EVERY 4 HOURS PRN
Status: DISCONTINUED | OUTPATIENT
Start: 2024-04-10 | End: 2024-04-22 | Stop reason: HOSPADM

## 2024-04-10 RX ADMIN — HYDROMORPHONE HYDROCHLORIDE 2 MG: 2 TABLET ORAL at 22:04

## 2024-04-10 RX ADMIN — CARVEDILOL 6.25 MG: 6.25 TABLET, FILM COATED ORAL at 08:23

## 2024-04-10 RX ADMIN — IRON SUCROSE 100 MG: 20 INJECTION, SOLUTION INTRAVENOUS at 10:42

## 2024-04-10 RX ADMIN — EPOETIN ALFA-EPBX 4000 UNITS: 4000 INJECTION, SOLUTION INTRAVENOUS; SUBCUTANEOUS at 10:47

## 2024-04-10 RX ADMIN — AMLODIPINE BESYLATE 5 MG: 5 TABLET ORAL at 08:22

## 2024-04-10 RX ADMIN — SODIUM CHLORIDE TAB 1 GM 2 G: 1 TAB at 08:23

## 2024-04-10 RX ADMIN — ALBUTEROL SULFATE 2.5 MG: 2.5 SOLUTION RESPIRATORY (INHALATION) at 07:14

## 2024-04-10 RX ADMIN — ALBUTEROL SULFATE 2.5 MG: 2.5 SOLUTION RESPIRATORY (INHALATION) at 00:15

## 2024-04-10 RX ADMIN — BETAMETHASONE DIPROPIONATE: 0.5 CREAM TOPICAL at 22:04

## 2024-04-10 RX ADMIN — HYDROMORPHONE HYDROCHLORIDE 2 MG: 2 TABLET ORAL at 16:39

## 2024-04-10 RX ADMIN — HEPARIN SODIUM 2000 UNITS: 1000 INJECTION INTRAVENOUS; SUBCUTANEOUS at 10:50

## 2024-04-10 RX ADMIN — SODIUM CHLORIDE 250 ML: 9 INJECTION, SOLUTION INTRAVENOUS at 10:46

## 2024-04-10 RX ADMIN — FLUTICASONE FUROATE AND VILANTEROL TRIFENATATE 1 PUFF: 200; 25 POWDER RESPIRATORY (INHALATION) at 08:25

## 2024-04-10 RX ADMIN — ACETAMINOPHEN 650 MG: 325 TABLET, FILM COATED ORAL at 19:51

## 2024-04-10 RX ADMIN — ALBUTEROL SULFATE 2.5 MG: 2.5 SOLUTION RESPIRATORY (INHALATION) at 21:16

## 2024-04-10 RX ADMIN — HYDROMORPHONE HYDROCHLORIDE 0.4 MG: 0.2 INJECTION, SOLUTION INTRAMUSCULAR; INTRAVENOUS; SUBCUTANEOUS at 14:19

## 2024-04-10 RX ADMIN — FAMOTIDINE 20 MG: 10 INJECTION, SOLUTION INTRAVENOUS at 05:58

## 2024-04-10 RX ADMIN — CARVEDILOL 6.25 MG: 6.25 TABLET, FILM COATED ORAL at 18:12

## 2024-04-10 RX ADMIN — BETAMETHASONE DIPROPIONATE: 0.5 CREAM TOPICAL at 08:26

## 2024-04-10 RX ADMIN — SODIUM CHLORIDE 300 ML: 9 INJECTION, SOLUTION INTRAVENOUS at 10:46

## 2024-04-10 RX ADMIN — HYDROMORPHONE HYDROCHLORIDE 0.4 MG: 0.2 INJECTION, SOLUTION INTRAMUSCULAR; INTRAVENOUS; SUBCUTANEOUS at 05:58

## 2024-04-10 RX ADMIN — NICOTINE 1 PATCH: 14 PATCH, EXTENDED RELEASE TRANSDERMAL at 08:26

## 2024-04-10 RX ADMIN — Medication: at 10:43

## 2024-04-10 RX ADMIN — SODIUM CHLORIDE TAB 1 GM 2 G: 1 TAB at 14:11

## 2024-04-10 RX ADMIN — Medication 1 CAPSULE: at 08:22

## 2024-04-10 RX ADMIN — SODIUM CHLORIDE TAB 1 GM 2 G: 1 TAB at 18:12

## 2024-04-10 RX ADMIN — HYDROMORPHONE HYDROCHLORIDE 0.4 MG: 0.2 INJECTION, SOLUTION INTRAMUSCULAR; INTRAVENOUS; SUBCUTANEOUS at 10:12

## 2024-04-10 ASSESSMENT — ACTIVITIES OF DAILY LIVING (ADL)
ADLS_ACUITY_SCORE: 39

## 2024-04-10 NOTE — PROGRESS NOTES
MD Notification    Notified Person: MD    Notified Person Name: Elder Bojorquez Md     Notification Date/Time: 04/10/24 1344    Notification Interaction: web paging    Purpose of Notification:  Orders Received: updated hospitalist on pt low sodium and vascular concern with it dropping. Trending sodium and dropping 124, 122. Potassium increasing, most recent 5.7. requested Dr Bojorquez speak directly with Dr Morales per Dr Morales request and provided direct cell phone.     Comments:

## 2024-04-10 NOTE — PLAN OF CARE
Date & Time: 4/10/24 [id number]    Surgery/POD#: POD#8 from R AKA; POD#1 R AKA closure     Behavior & Aggression: green  Fall Risk: yes  Orientation: A&O x 4  ABNL VS/O2: VSS on RA ex. HTN  ABNL Labs: Na 124, K+ 5.9, Cr 3.2, Ca 7.7  Pain Management: dilaudid, pt. reports oxy does not make her feel good   Bowel/Bladder: on hemodialysis  Drains: RUE double lumen PICC,   Wounds/incisions: R thigh bruise. L groin and R abd site CDI   Diet: renal diet, calorie count; no free water  Activity Level: Ax2 lift, T/R  Tests/Procedures: N/A  Anticipated  DC Date: pending   Significant Information: No free water for pt and on calorie count. Pivoted onto bedside commode. Unable to lift RLE.

## 2024-04-10 NOTE — PROGRESS NOTES
Goal Outcome Evaluation: Date & Time:  4/9/24 [id number]  Surgery/POD#: POD 7 from R AKA, POD #0 R AKA closure  Behavior & Aggression: Green   Fall Risk: Yes   Orientation: A&Ox4   ABNL VS/O2:VSS on 3L NC- CAPNO WDL  Pain Management:  IV Dilaudid.  Abn labs:  sodium 124, K+ 5.5, mag 2.5  Bowel/Bladder: On HD, x1 small/medium loose BM during shift   Drains: NJ tube clamped. RUE double lumen PICC infusing NS @ 1Oml/hr   Wounds/incisions: R thigh bruise. L groin and R abd site CDI   Diet:clears, TF discontinued   Activity Level: Ax2 lift, T/R,    Tests/Procedures:    Anticipated  DC Date: Pending   A/O x2, Right HD port CDI. R AKA elevated, dressing with serosang drainage noted.

## 2024-04-10 NOTE — PROGRESS NOTES
"VASCULAR SURGERY PROGRESS NOTE    Subjective:  Now s/p completion right AKA yesterday, pain well controlled but with continued  hyponatremia to 122 overnight. Noted to have coughing episodes with attempted PO. NJ tube removed by vascular team at bedside last night. Of note pt is much more away, alert, and oriented to self and hospital course today and seems near baseline.     Objective:  Intake/Output Summary (Last 24 hours) at 4/9/2024 0738  Last data filed at 4/9/2024 0600  Gross per 24 hour   Intake 925 ml   Output 1800 ml   Net -875 ml     PHYSICAL EXAM:  BP (!) 151/67 (BP Location: Left arm, Patient Position: Semi-Myers's, Cuff Size: Adult Regular)   Pulse 73   Temp 98  F (36.7  C) (Oral)   Resp 15   Ht 1.549 m (5' 1\")   Wt 53.9 kg (118 lb 13.3 oz)   LMP  (LMP Unknown)   SpO2 94%   BMI 22.45 kg/m    General: The patient is alert, awake, conversant, and oriented today.   Psych: pleasant affect  Skin: Color appropriate for race, warm, dry.  Respiratory: The patient does not require supplemental oxygen. Breathing unlabored  GI:  Abdomen soft, nontender to light palpation.  Extremities: 2+ CFA pulse bilaterally. Right thigh dressing in place with small amount of serosanguinous drainage.    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 Discussed worsening hyponatremia with medical team, no indication for hypertonic saline at this time as pt is asymptomatic. Plasma osmol 290. Urine osmol pending ability to produce urine   -zero free water  -salt tabs added to diet  -cortisol labs this AM to r/o adrenal crisis  -follow " up nephrology labs and recs for electrolyte abnormalities  -PT consult, out of bed today  -keep dressing in place to right AKA, vascular to change on 4/11  -cont NS iv fluids   -SLP eval today due to difficulty with solid food last night  -no further transfusion at this time    Discussed pt history, exam, assessment and plan with Dr. Hernandez of the vascular surgery service, who is in agreement with the above.    Gala Morales, DO  VASCULAR SURGERY

## 2024-04-10 NOTE — PROGRESS NOTES
Goal Outcome Evaluation: Date & Time:  4/9/24 overnight  Surgery/POD#: POD 8 from R AKA, POD #1 R AKA closure  Behavior & Aggression: Green   Fall Risk: Yes   Orientation: A&Ox4   ABNL VS/O2:VSS on 2L NC- ex HTN  Pain Management:  IV Dilaudid x 2. Pt reports oxy does not make her feel good.  Abn labs:  sodium 122, K+ 5.7, mag 2.5 iron 22, iron binding 124, BG 86  Bowel/Bladder: On HD, x1 small/medium loose BM during shift , pt able to void on for urine sample, dario in color.   Drains: RUE double lumen PICC infusing NS @ 1Oml/hr   Wounds/incisions: R thigh bruise. L groin and R abd site CDI   Diet:clears, pt on clears, No free water due to low sodium, speech consulted to ensure she is not aspirating. Writer noted first bite pt coughing, however pt does have a cough currently.  Activity Level: Ax2 lift, T/R,    Tests/Procedures:  monitoring sodium, hemodialysis today  Anticipated  DC Date: Pending   A/O x2, Right HD port CDI. R AKA elevated, dressing with serosang drainage noted, pulses palpable. Neuros WNL.

## 2024-04-10 NOTE — MEDICATION SCRIBE - ADMISSION MEDICATION HISTORY
MD Notification    Notified Person: MD    Notified Person Name: Dio Fletcher      Notification Date/Time:  04/09/2024 9211    Notification Interaction: web based paging    Purpose of Notification: update hospitalist per vascular MD Gala Morales regarding the low sodium of 124.  Dr Morales put in order for no free water. Dr Morales was wondering if you had any recommendations and wanted to get Urology involved?    Orders Received: Dr Dio Fletcher ordered labs for osmolarity urine, sodium raandom urine, osmolarity, tsh with free t4.    Writer updated Dr Fletcher pt is on hemodialysis and does not make much urine.     Comments: writer pulled blood work. Place purewick to collect urine sample. Still unable to produce sample.

## 2024-04-10 NOTE — PROGRESS NOTES
Potassium   Date Value Ref Range Status   04/10/2024 5.9 (H) 3.4 - 5.3 mmol/L Final   12/29/2022 4.3 3.4 - 5.3 mmol/L Final   07/09/2021 5.2 3.4 - 5.3 mmol/L Final     Hemoglobin   Date Value Ref Range Status   04/09/2024 9.0 (L) 11.7 - 15.7 g/dL Final   07/09/2021 8.8 (L) 11.7 - 15.7 g/dL Final     Creatinine   Date Value Ref Range Status   04/10/2024 3.20 (H) 0.51 - 0.95 mg/dL Final   07/09/2021 0.77 0.52 - 1.04 mg/dL Final     Urea Nitrogen   Date Value Ref Range Status   04/10/2024 66.8 (H) 8.0 - 23.0 mg/dL Final   12/29/2022 17 7 - 30 mg/dL Final   07/09/2021 13 7 - 30 mg/dL Final     Sodium   Date Value Ref Range Status   04/10/2024 124 (L) 135 - 145 mmol/L Final     Comment:     Reference intervals for this test were updated on 09/26/2023 to more accurately reflect our healthy population. There may be differences in the flagging of prior results with similar values performed with this method. Interpretation of those prior results can be made in the context of the updated reference intervals.    07/09/2021 132 (L) 133 - 144 mmol/L Final     INR   Date Value Ref Range Status   10/07/2023 1.38 (H) 0.85 - 1.15 Final   09/24/2020 1.01 0.86 - 1.14 Final       DIALYSIS PROCEDURE NOTE  Hepatitis status of previous patient on machine log was checked and verified ok to use with this patients hepatitis status.  Patient dialyzed for 3 hrs. on a K2 bath with a net fluid removal of  2.5L.  A BFR of 300 ml/min was obtained via a right CVC.     The treatment plan was discussed with Dr. Alegria during the treatment.    Total heparin received during the treatment: 0 units.     Line flushed, clamped and capped with heparin 1:1000 1.6 mL (1600 units) per lumen    Meds  given: epoetin 4,000 units, iron sucrose 100 mg,     Complications: none    Goal increased during HD tx per MD verbal order to 2.5 KG.  Person educated: patient. Knowledge base minimal. Barriers to learning: none. Educated on procedure via verbal mode. The patient  verbalized understanding. Pt prefers verbal education style.     ICEBOAT? Timeout performed pre-treatment  I: Patient was identified using 2 identifiers  C:  Consent Signed Yes  E: Equipment preventative maintenance is current and dialysis delivery system OK to use  B: Hepatitis B Surface Antigen: Negative; Draw Date: 4/3/2024      Hepatitis B Surface Antibody: Susceptible; Draw Date: 4/3/2024    O: Dialysis orders present and complete prior to treatment  A: Vascular access verified and assessed prior to treatment  T: Treatment was performed at a clinically appropriate time  ?: Patient was allowed to ask questions and address concerns prior to treatment  See Adult Hemodialysis flowsheet in [employer] for further details and post assessment.  Machine water alarm in place and functioning. Transducer pods intact and checked every 15min.   Pt returned via bed.  Chlorine/Chloramine water system checked every 4 hours.  [facility] D[facility]    Patient repositioned every 2 hours during the treatment.  Post treatment report given to ETHEL Merino RN regarding 2.5L of fluid removed, last BP of 175/78, and patient pain rating of 2/10.

## 2024-04-10 NOTE — PROGRESS NOTES
[facility]  Inpatient Clinical Swallow Evaluation   04/10/24 0922   Appointment Info   Signing Clinician's Name / Credentials (SLP) April Hou MS CCC SLP   General Information   Onset of Illness/Injury or Date of Surgery 03/29/24   Referring Physician Gala Morales DO   Patient/Family Therapy Goal Statement (SLP) None stated   Pertinent History of Current Problem Per provider documentation - Shirley Hendricks is a 63 year old female admitted on 3/29/2024 due to occlusion of right LE bypass graft.       Past medical history significant for PAD/PVD, Tobacco use D/O, CAD (history of MI x3), HTN, HLP, DM2, Neuropathy, COPD, GERD, Anemia, Spongiotic dermatitis, MDD with anxiety, Chronic anticoagulation therapy with history of DVT/PE, OA, Charcot-Breonna-Tooth foot deformity, Marginal zone B-cell lymphoma, History of SBO, History of Takotsubo CM, History of SVT.   General Observations Pt is alert, agreeable to evaluation with encouragement.   Type of Evaluation   Type of Evaluation Swallow Evaluation   Oral Motor   Oral Musculature generally intact   Structural Abnormalities none present   Mucosal Quality adequate   Dentition (Oral Motor)   Dentition (Oral Motor) adequate dentition   Vocal Quality/Secretion Management (Oral Motor)   Vocal Quality (Oral Motor) WFL   Secretion Management (Oral Motor) WNL   General Swallowing Observations   Past History of Dysphagia Pt denies a dysphagia hx, none noted upon chart review.   Respiratory Support nasal cannula   Current Diet/Method of Nutritional Intake (General Swallowing Observations, NIS) clear liquid diet   Swallowing Evaluation Clinical swallow evaluation   Clinical Swallow Evaluation   Feeding Assistance minimal assistance required   Clinical Swallow Evaluation Textures Trialed thin liquids;pureed;solid foods   Clinical Swallow Eval: Thin Liquid Texture Trial   Mode of Presentation, Thin Liquids straw;self-fed   Volume of Liquid or Food Presented  2 oz   Oral Phase of Swallow WFL   Pharyngeal Phase of Swallow intact   Diagnostic Statement No immediate s/sx of aspiration. Pt has a dry cough that occurs intermittently t/o the evaluation   Clinical Swallow Evaluation: Puree Solid Texture Trial   Mode of Presentation, Puree spoon;fed by clinician   Volume of Puree Presented 3 oz   Oral Phase, Puree WFL   Pharyngeal Phase, Puree intact   Diagnostic Statement No overt s/sx of aspiration   Clinical Swallow Evaluation: Solid Food Texture Trial   Mode of Presentation self-fed   Volume Presented 3/4 cracker   Oral Phase WFL   Pharyngeal Phase intact   Diagnostic Statement No immediate s/sx of aspiration. Pt has a dry cough that occurs intermittently t/o the evaluation   Esophageal Phase of Swallow   Patient reports or presents with symptoms of esophageal dysphagia Yes   Esophageal comments Has diagnosed reflux esophagitis   Swallowing Recommendations   Diet Consistency Recommendations regular diet;thin liquids (level 0)   Supervision Level for Intake 1:1 supervision needed   Mode of Delivery Recommendations bolus size, small;slow rate of intake   Swallowing Maneuver Recommendations alternate food and liquid intake   Monitoring/Assistance Required (Eating/Swallowing) cue for finger/lingual sweep if oral pocketing present;stop eating activities when fatigue is present;monitor for cough or change in vocal quality with intake   Recommended Feeding/Eating Techniques (Swallow Eval) maintain upright sitting position for eating;maintain upright posture during/after eating for 30 minutes;minimize distractions during oral intake;provide assist with feeding;provide oral hygiene prior to intake   Medication Administration Recommendations, Swallowing (SLP) As per pt preference   Instrumental Assessment Recommendations instrumental evaluation not recommended at this time   General Therapy Interventions   Planned Therapy Interventions Dysphagia Treatment   Clinical Impression    Criteria for Skilled Therapeutic Interventions Met (SLP Eval) Yes, treatment indicated   SLP Diagnosis Oropharyngeal dysphagia   Risks & Benefits of therapy have been explained evaluation/treatment results reviewed;care plan/treatment goals reviewed;participants voiced agreement with care plan;participants included;patient   Clinical Impression Comments Pt seen for clinical swallow evaluation. Evaluation is somewhat limited by pt's cooperation. She has a cough that is present t/o the evaluation in the presence and absence of PO. She consumed limited amounts of PO per her preference - no immediate, overt s/sx of aspiration noted. Pt is also insistent her swallowing is fine, she is also not agreeable to sitting fully upright and has a hx of reflux esophagitis.     Oropharyngeal dysphagia based on limited assessment. Diet defered to providers, from an oropharyngeal perspective pt may consume a regular diet and thin liquids with use of strategies as much as patient agrees. Complete upright position, small bites/sips, slow rate, and alternate solids and liquids, remain sitting up for at least 30 minutes after PO. Initiate SLP services for dysphagia.   SLP Total Evaluation Time   Eval: oral/pharyngeal swallow function, clinical swallow Minutes (15910) 50

## 2024-04-10 NOTE — PROGRESS NOTES
"PT: Patient agreeable to discuss smoking cessation although patient reports she will quit on her terms. Provided patient with \"Help for Smokers\" handout.   04/10/24 1400   General Information   Patient is receptive to smoking cessation at this time Yes   Packs Per Day 0.25 PPD   Years Smoked (#) 55 yrs   Cigarette Pack Years 13.75   Stage of Behavior Change   Patient's Stage of Behavior Change Contemplation \"I might (within the next 60 days\"   Processes of Change   The following interventions were used (smoking cessation) Increase awareness of effects of smoking on health;Problem-solve barriers to quitting;Recognize rewards of value to him/her;Review relapse prevention strategies   Motivation to Quit Scale (1-10) 7   Confidence to Quit Scale (1-10) 8   Quit Attempt Hours (#)   (Pt reports she has attempted to quit smoking in the past, as gone about 3 months without smoking prior.)   Education/Recommendations   Education QUIT PLAN phone line   Treatment Time (Minutes) 10 minutes   Total Evaluation Time   Total Evaluation Time (Minutes) 5       "

## 2024-04-10 NOTE — PROGRESS NOTES
[facility]    Medicine Progress Note - Hospitalist Service    Date of Admission:  3/29/2024  Date of Service: 04/10/2024    Assessment & Plan     Shirley Hendricks is a 63 year old female admitted on 3/29/2024 due to occlusion of right LE bypass graft.      Past medical history significant for PAD/PVD, Tobacco use D/O, CAD (history of MI x3), HTN, HLP, DM2, Neuropathy, COPD, GERD, Anemia, Spongiotic dermatitis, MDD with anxiety, Chronic anticoagulation therapy with history of DVT/PE, OA, Charcot-Breonna-Tooth foot deformity, Marginal zone B-cell lymphoma, History of SBO, History of Takotsubo CM, History of SVT.    Discussed with Dr. Mazariegos and reviewed ED notes and Vascular Surgery consult note.  Patient presented to the ED due to right leg pain that had begun ~ 1 hour prior to presentation.  She reported pain seems to worsen with walking and when she attempted to check a pulse in her leg it was absent.  She also described that the foot is colder than the left.      While in the lobby attempts to find right pedal pulse with doppler were unsuccessful.  Dr. Lozano of Vascular Surgery was contacted by the patient as well as Dr. Mazariegos.      Work-up completed while patient was in the lobby included right arterial LE US revealed the right common femoral artery to mid anterior tibial artery bypass graft occlusion.      After assessing patient labs were collected and included a CMP that revealed a sodium of 132, chloride of 97, CO2 of 21 and glucose of 103 otherwise within normal limits.  CBC with differential revealed an RDW of 15.7 otherwise unremarkable.    Right common femoral artery to mid anterior tibial artery bypass graft acute occlusion  PAD/PVD  Acute Blood Loss Anemia  *Occurred despite low-dose Xarelto and Plavix therapy.    - Vascular surgery consult requested -> urgent guillotine right above knee amputation today followed by close monitoring in the ICU.    - Tentative plan  to return to OR today for formalization of AKA with Dr. Diaz   - IR was consulted and following  --Per Vascular surgery consult Dr. Salomon was contacted and s/p right LE angiogram and lytic therapy 03/30 -> 03/31 took the patient to the angio suite to remove the sheath in the left groin with closure device.   --IR now signed off  - Hold PTA Xarelto 15 mg nightly.    - Defer resumption of PTA Plavix 75 mg/d to Vascular Surgery and/or IR -> plavix to restart once platelets improve, continue to monitor bypass in LLE   - Statin and antihypertensive management as below.    - Pain management as needed  - Follow Hgb   - transfused 1 U PRBCs 03/30 / 03/31 and 04/02 and 04/06 and 04/09 and closely monitor.  - CBC Q12H, transfuse as needed Hgb < 7.0    Rhabdomyolysis   ARF  Acute Hyponatremia  Assessment: CPK has been rising. Her serum creatinine and body urea nitrogen also rising. Renal US -> No obstruction demonstrated.   Plan:  - Follow CK / BMP daily -> Cr stable  - Nephrology following -> now on HD, Decreased BP following dilaudid for pain and 25% albumin ordered with improvement in BPs  - Hyponatremia mgtmt per renal service  - Avoid NSAIDs / nephrotoxins    Colon distention  Abdominal Pain  Assessment:CT abdomen 04/03 -> Medium-sized right retroperitoneal and extraperitoneal hematoma. Evaluation for extravasation is unable to be performed without contrast. This is likely similar to slightly smaller than the CT lumbar spine although this is incompletely visualized at   that time  Extensive pulmonary opacities. Differential: Multifocal infection, ARDS, and/or pulmonary  Plan:  --NG placed -> remove in coming days as GI issues resolve  --Can stop TFs -> Dialysis diet  --Hold Anticoagulation for now given anemia  --GI following ->  consider flex sig/colonoscopy as outpatient.  --Repeat flat AXR 4/5: Nonobstructive bowel gas pattern. Gaseous distention of the colon. Enteric feeding tube tip in the fourth portion of the  duodenum. Iliac stents, cholecystectomy clips, and endoscopic clips in the right hemiabdomen.   --Continue to monitor  --Pain control as needed    MDD with anxiety  Acute Delirium   *Noted on chart review.  No interventions.    Delirium from acute illness  Plan:  Delirium has mostly resolved  Continue to maintain delirium precautions.   IM Zyprexa as needed    Contrast dye allergy  - Ordered solumedrol and benadryl per contrast dye allergy protocol.      Mild hyponatremia  - BMP daily    Tobacco Use D/O   Patient currently smokes max 5 cigarettes per day.     - Counseled regarding smoking cessation that should also continue with PCP.    - Nicoderm patch ordered.    Recent celiac disease  *Patient reported recent GI work-up revealed she has celiac disease.    - Once diet can be advanced would request no gluten/celiac diet.      CAD (history of MI x3)  HTN  HLP  *Last coronary angiogram completed 10/2023.    - Resumed on PTA Coreg 6.25 mg BID, hold lisinopril 10 mg/d due to LIMA, continue Norvasc 2.5 mg/d.  Hold parameters in place.    - hold PTA rosuvastatin 40 mg/d due to acute rhabdo  - PRN IV hydralazine 10 mg every 4 hours for SBP GREATER THAN 180.      History of Takotsubo CM  *Recovered EF (55-60%) from ECHO 11/2023.    - Resumed on PTA Coreg 6.25 mg BID (hold for SBP < 110), hold lisinopril 10 mg/d and  hold Jardiance 10 mg/d For now given ARF    Type 2 DM  Diabetic neuropathy  *Noted diagnosis on chart review.  However, A1c of 5.2%.    - Hold PTA Jardiance 10 mg/d and gabapentin 100 mg TID due to AMS.  - No insulin as borderline hypoglycemic    COPD   - Resumed on PTA inhalers.    - Encourage use of Flutter valve/IS.      GERD  - Resumed on PTA omeprazole 20 mg/d.      Anemia  Recent GI bleed (12/2023 and admitted at [facility])  - Hold PTA Iron supplements and resume at discharge.    - CBC daily      Spongiotic dermatitis  *Recently seen at outpatient Derm.    - Resumed on PTA betamethasone cream BID.       Chronic anticoagulation therapy with history of DVT/PE  - Hold PTA Xarelto.      OA  - Pain management as above.      Charcot-Breonna-Tooth foot deformity  *Noted on chart review.  No interventions.     Stage VALENTIN marginal zone B-cell lymphoma  *Follows with [facility] (Dr. Barrow).    - Continue outpatient surveillance (CT scan in 8/2024).      History of SBO  *Noted on chart review.  No interventions.    History of SVT  - Resumed on PTA Coreg as above.            Diet: Calorie Counts  Snacks/Supplements Adult: Ensure Clear; Between Meals  Snacks/Supplements Adult: Gelatein Plus; Between Meals  Snacks/Supplements Adult: Ensure Max Protein (bariatric); With Meals  Fluid restriction OTHER (SEE COMMENTS)  Renal Diet (dialysis)    DVT Prophylaxis: Pneumatic Compression Devices  Alvarez Catheter: Not present  Lines: PRESENT      PICC 03/31/24 Double Lumen Right Brachial vein medial access-Site Assessment: WDL  CVC Double Lumen Right Internal jugular Tunneled-Site Assessment: WDL      Cardiac Monitoring: None  Code Status: Full Code      Clinically Significant Risk Factors        # Hyperkalemia: Highest K = 5.9 mmol/L in last 2 days, will monitor as appropriate  # Hyponatremia: Lowest Na = 122 mmol/L in last 2 days, will monitor as appropriate    # Hypomagnesemia: Lowest Mg = 1.5 mg/dL in last 2 days, will replace as needed   # Hypoalbuminemia: Lowest albumin = 1.8 g/dL at 4/9/2024  6:36 AM, will monitor as appropriate   # Thrombocytopenia: Lowest platelets = 48 in last 2 days, will monitor for bleeding   # Hypertension: Noted on problem list         # Severe Malnutrition: based on nutrition assessment    # Financial/Environmental Concerns:           Disposition Plan     Expected Discharge Date: 04/12/2024        Discharge Comments: TF, R amp, PT/OT  Neph=dilaysis  Wound Vac          Marcin White MD  Hospitalist Service  [facility]  Securely message with [employer] (more info)  Text page via [employer]  Jennifer/Drew   ______________________________________________________________________    Interval History     No acute events overnight  Resting in bed.  Abdominal pain essentially resolved  Hgb improved to 9.0  Main complaint post op pain today, but better and more controlled  No fevers  No new CP/SOB   No nausea / vomiting  No new complaints otherwise    Physical Exam   Vital Signs: Temp: 98.1  F (36.7  C) Temp src: Oral BP: (!) 180/74 Pulse: 74   Resp: 20 SpO2: 98 % O2 Device: Nasal cannula Oxygen Delivery: 2 LPM  Weight: 118 lbs 13.25 oz      Constitutional: Awake, alert, no apparent distress.    Pulmonary: CTABL  Cardiovascular: Regular rate and rhythm, normal S1 and S2, no S3 or S4, and no murmur noted. 2+ pitting edema bilaterally.  GI: Normal bowel sounds, soft, non-distended, non-tender.    Neuro: Fahad but has sensory loss and is not able to wiggle toes but is able to plantarflex and dorsiflex.   Psych:  Alert and oriented x3. Fahad. Normal affect.  Extremities: Right AKA wound with dressing.  ----------------------------------------------------------------------------------------    Medical Decision Making       55 MINUTES SPENT BY ME on the date of service doing chart review, history, exam, documentation & further activities per the note.      Data   ------------------------- PAST 24 HR DATA REVIEWED -----------------------------------------------    I have personally reviewed the following data over the past 24 hrs:    3.9 (L)  \   9.0 (L)   / 105 (L)     124 (L) 91 (L) 66.8 (H) /  78   5.9 (H) 20 (L) 3.20 (H) \     TSH: 2.53 T4: N/A A1C: N/A     Ferritin:  609 (H) % Retic:  N/A LDH:  N/A       Imaging results reviewed over the past 24 hrs:   No results found for this or any previous visit (from the past 24 hour(s)).    ------------------------- ENCOUNTER LABS ----------------------------------------------------------------  Recent Labs   Lab 04/10/24  1324 04/10/24  0705 04/10/24  0610 04/10/24  0151  04/10/24  0014 04/09/24  2121 04/09/24  1817 04/09/24  1212 04/09/24  0636 04/08/24  1200 04/08/24  0631 04/04/24  0805 04/04/24  0541   WBC  --   --   --   --   --   --  3.9*  --  3.9*  --  6.5   < > 9.6   HGB  --   --   --   --   --   --  9.0*  --  6.7*  --  8.7*   < > 7.8*   MCV  --   --   --   --   --   --  86  --  84  --  83   < > 82   PLT  --   --   --   --   --   --  105*  --  48*  --  55*   < > 54*   NA  --  124*  --   --  122*  --  124*  --  127*  --  127*   < > 136   POTASSIUM  --  5.9*  --   --  5.7*  --  5.5*  --  4.9  --  4.4   < > 4.1   CHLORIDE  --  91*  --   --  89*  --  91*  --  94*  --  93*   < > 98   CO2  --  20*  --   --  22  --  23  --  25  --  24   < > 25   BUN  --  66.8*  --   --  60.5*  --  54.0*  --  48.3*  --  55.8*   < > 55.3*   CR  --  3.20*  --   --  3.07*  --  2.84*  --  2.66*  --  2.85*   < > 3.13*   ANIONGAP  --  13  --   --  11  --  10  --  8  --  10   < > 13   SONIA  --  7.7*  --   --  7.6*  --  7.4*  --  7.1*  --  7.0*   < > 6.9*   GLC 78 90 86   < > 107*   < > 112*   < > 78   < > 123*   < > 103*   ALBUMIN  --   --   --   --   --   --   --   --  1.8*  --   --   --  2.1*   PROTTOTAL  --   --   --   --   --   --   --   --  3.9*  --   --   --  4.2*   BILITOTAL  --   --   --   --   --   --   --   --  0.9  --   --   --  0.6   ALKPHOS  --   --   --   --   --   --   --   --  155*  --   --   --  192*   ALT  --   --   --   --   --   --   --   --  53*  --   --   --  326*   AST  --   --   --   --   --   --   --   --  68*  --   --   --  301*    < > = values in this interval not displayed.       Most Recent 3 CBC's:  Recent Labs   Lab Test 04/09/24  1817 04/09/24  0636 04/08/24  0631   WBC 3.9* 3.9* 6.5   HGB 9.0* 6.7* 8.7*   MCV 86 84 83   * 48* 55*     Most Recent 3 BMP's:  Recent Labs   Lab Test 04/10/24  1324 04/10/24  0705 04/10/24  0610 04/10/24  0151 04/10/24  0014 04/09/24  2121 04/09/24  1817   NA  --  124*  --   --  122*  --  124*   POTASSIUM  --  5.9*  --   --  5.7*  --  5.5*    CHLORIDE  --  91*  --   --  89*  --  91*   CO2  --  20*  --   --  22  --  23   BUN  --  66.8*  --   --  60.5*  --  54.0*   CR  --  3.20*  --   --  3.07*  --  2.84*   ANIONGAP  --  13  --   --  11  --  10   SONIA  --  7.7*  --   --  7.6*  --  7.4*   GLC 78 90 86   < > 107*   < > 112*    < > = values in this interval not displayed.     Most Recent 2 LFT's:  Recent Labs   Lab Test 04/09/24  0636 04/04/24  0541   AST 68* 301*   ALT 53* 326*   ALKPHOS 155* 192*   BILITOTAL 0.9 0.6     Most Recent 3 INR's:  Recent Labs   Lab Test 10/07/23  0516 10/06/23  0551 10/05/23  1014   INR 1.38* 1.08 0.95     Most Recent 3 Troponin's:  Recent Labs   Lab Test 06/10/20  1243 02/16/20  1824 09/18/19  0739   TROPI <0.015 <0.015 <0.015     Most Recent 3 BNP's:No lab results found.  Most Recent D-dimer:  Recent Labs   Lab Test 08/13/21  0934   DD 10.38*     Most Recent Cholesterol Panel:  Recent Labs   Lab Test 10/03/23  0941   CHOL 137   LDL 69   HDL 54   TRIG 68     Most Recent 6 Bacteria Isolates From Any Culture (See EPIC Reports for Culture Details):No lab results found.  Most Recent TSH and T4:  Recent Labs   Lab Test 04/10/24  0014 02/04/19  0934 03/28/17  0922   TSH 2.53   < > 1.37   T4  --   --  1.19    < > = values in this interval not displayed.     Most Recent Urinalysis:  Recent Labs   Lab Test 02/04/19  0935   COLOR Yellow   APPEARANCE Clear   URINEGLC Negative   URINEBILI Negative   URINEKETONE Negative   SG 1.010   UBLD Negative   URINEPH 6.0   PROTEIN Negative   UROBILINOGEN 0.2   NITRITE Negative   LEUKEST Negative   RBCU O - 2   WBCU 0 - 5     Most Recent ESR & CRP:  Recent Labs   Lab Test 11/13/23  1705   SED 39*

## 2024-04-10 NOTE — PROGRESS NOTES
Renal Medicine Progress Note            Assessment/Plan:     # Patient with a creatinine of 0.45-0.9 mg/dl at baseline.      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               -dialysis dependent     # Ischemic RLE due to occluded bypass graft   -AKA due to unsalvagable limb 04/01/24     # Anemia and thrombocytopenia:  Had blood transfusion yesterday.      # FEN: 2+ pitting edema. Hypervolemia hypernatremia. Should improve with UF and dialysis. Hyperkalemia-should improved with HD. Corrected calcium is normal at ~ 9.5        Plan:  # 3hrs HD. UF ~ 2.5 liters net if tolerates. Na 136 and 2K bath. Qb 350. No heparin. Next HD tx is on Friday.  # Coordinator to assist with outpatient dialysis unit placement.        Interval History:     Afebrile. VSS.  She is getting HD tx.  She complains of pain in the amputated leg.           Medications and Allergies:     Current Facility-Administered Medications   Medication Dose Route Frequency Provider Last Rate Last Admin    - MEDICATION INSTRUCTIONS for Dialysis Patients -   Does not apply See Admin Instructions Marcin White MD        albuterol (PROVENTIL) neb solution 2.5 mg  2.5 mg Nebulization Q6H Marcin White MD   2.5 mg at 04/10/24 0714    amLODIPine (NORVASC) tablet 5 mg  5 mg Oral Daily Marcin White MD   5 mg at 04/10/24 0822    betamethasone dipropionate (DIPROSONE) 0.05 % cream   Topical BID Marcin White MD   Given at 04/10/24 0826    carvedilol (COREG) tablet 6.25 mg  6.25 mg Oral BID w/meals Marcin White MD   6.25 mg at 04/10/24 0823    Contraindications to both pharmacological and mechanical prophylaxis (must document contraindications for both in this order)   Does not apply See Admin Instructions Marcin White MD        epoetin mireya-epbx (RETACRIT) injection 4,000 Units  4,000 Units Intravenous Once Torrey Alegria MD        famotidine (PEPCID) injection 20 mg  20 mg Intravenous Q48H Marcin White MD   20 mg at 04/10/24  0558    fluticasone-vilanterol (BREO ELLIPTA) 200-25 MCG/ACT inhaler 1 puff  1 puff Inhalation Daily Marcin White MD   1 puff at 04/10/24 0825    sodium chloride 0.9% DIALYSIS Cath LOCK - RED Lumen  10 mL Intracatheter Once in dialysis/CRRT Torrey Alegria MD        Followed by    heparin 1000 unit/mL DIALYSIS Cath LOCK - RED Lumen  1.3-2.6 mL Intracatheter Once in dialysis/CRRT Torrey Alegria MD        sodium chloride 0.9% DIALYSIS Cath LOCK - BLUE Lumen  10 mL Intracatheter Once in dialysis/CRRT Torrey Alegria MD        Followed by    heparin 1000 unit/mL DIALYSIS Cath LOCK -BLUE Lumen  1.3-2.6 mL Intracatheter Once in dialysis/CRRT Torrey Alegria MD        insulin aspart (NovoLOG) injection (RAPID ACTING)  1-7 Units Subcutaneous TID AC Marcin White MD        insulin aspart (NovoLOG) injection (RAPID ACTING)  1-5 Units Subcutaneous At Bedtime Marcin White MD        iron sucrose (VENOFER) injection 100 mg  100 mg Intravenous Once in dialysis/CRRT Torrey Alegria MD        Rigo PACK 1 packet  1 packet Oral BID 09 12 Gala Morales DO        multivitamin RENAL (TRIPHROCAPS) capsule 1 capsule  1 capsule Oral Daily Gala Morales DO   1 capsule at 04/10/24 0822    nicotine (NICODERM CQ) 14 MG/24HR 24 hr patch 1 patch  1 patch Transdermal Daily Marcin White MD   1 patch at 04/10/24 0826    No heparin via hemodialysis machine   Does not apply Once Torrey Alegria MD        sodium chloride (PF) 0.9% PF flush 10-40 mL  10-40 mL Intracatheter Q8H Sofia Cristobal MD   20 mL at 04/10/24 0559    sodium chloride (PF) 0.9% PF flush 3 mL  3 mL Intracatheter Q8H Kaylee Liu NP        sodium chloride 0.9% BOLUS 250 mL  250 mL Intravenous Once in dialysis/CRRT Torrey Alegria MD        sodium chloride 0.9% BOLUS 300 mL  300 mL Hemodialysis Machine Once Torrey Alegria MD        sodium chloride tablet 2 g  2 g Oral TID w/meals Gala Morales,    2 g at 04/10/24 0823        Allergies  "  Allergen Reactions    Pantoprazole      Protonix caused diffuse edema    Chantix [Varenicline]      Terrible dreams    Contrast Dye Swelling     Patient reports facial and throat swelling with prior CT contrast.    Penicillins Itching and Rash            Physical Exam:   Vitals were reviewed   , Blood pressure (!) 167/90, pulse 54, temperature 98.4  F (36.9  C), temperature source Oral, resp. rate 16, height 1.549 m (5' 1\"), weight 53.9 kg (118 lb 13.3 oz), SpO2 93%, not currently breastfeeding.    Wt Readings from Last 3 Encounters:   04/09/24 53.9 kg (118 lb 13.3 oz)   01/08/24 49.8 kg (109 lb 12.8 oz)   12/12/23 50.8 kg (112 lb)       Intake/Output Summary (Last 24 hours) at 4/10/2024 1004  Last data filed at 4/10/2024 0641  Gross per 24 hour   Intake 1051 ml   Output 50 ml   Net 1001 ml     GENERAL APPEARANCE: Frail.   HEENT:  Eyes/ears/nose/neck grossly normal  RESP: lungs cta b c good efforts, no crackles, rhonchi or wheezes  CV: RRR  ABDOMEN: Soft, NT  EXTREMITIES/SKIN:R AKA.  2+ pitting edema.   NEURO: Awake, alert and conversing normally  R TDC CDI         Data:     CBC RESULTS:     Recent Labs   Lab 04/09/24  1817 04/09/24  0636 04/08/24  0631 04/07/24  2058 04/07/24  0628 04/06/24  1830   WBC 3.9* 3.9* 6.5 6.7 6.9 7.0   RBC 3.03* 2.37* 3.01* 2.87* 3.03* 2.98*   HGB 9.0* 6.7* 8.7* 8.0* 8.8* 8.7*   HCT 26.0* 20.0* 25.1* 23.9* 25.4* 25.1*   * 48* 55* 38* 38* 36*       Basic Metabolic Panel:  Recent Labs   Lab 04/10/24  0705 04/10/24  0610 04/10/24  0151 04/10/24  0014 04/09/24  2121 04/09/24  1817 04/09/24  1212 04/09/24  0636 04/08/24  1200 04/08/24  0631 04/07/24  1140 04/07/24  1058   *  --   --  122*  --  124*  --  127*  --  127*  --  134*   POTASSIUM 5.9*  --   --  5.7*  --  5.5*  --  4.9  --  4.4  --  3.6   CHLORIDE 91*  --   --  89*  --  91*  --  94*  --  93*  --  99   CO2 20*  --   --  22  --  23  --  25  --  24  --  23   BUN 66.8*  --   --  60.5*  --  54.0*  --  48.3*  --  55.8*  --  " 37.0*   CR 3.20*  --   --  3.07*  --  2.84*  --  2.66*  --  2.85*  --  2.16*   GLC 90 86 111* 107* 118* 112*   < > 78   < > 123*   < > 118*   SONIA 7.7*  --   --  7.6*  --  7.4*  --  7.1*  --  7.0*  --  6.9*    < > = values in this interval not displayed.       INRNo lab results found in last 7 days.   Attestation:   I have reviewed today's relevant vital signs, notes, medications, labs and imaging.    Torrey Alegria MD  [facility] - Nephrology  Office phone :[phone]  Pager: [phone]

## 2024-04-11 ENCOUNTER — APPOINTMENT (OUTPATIENT)
Dept: OCCUPATIONAL THERAPY | Facility: CLINIC | Age: 64
DRG: 270 | End: 2024-04-11
Payer: COMMERCIAL

## 2024-04-11 ENCOUNTER — APPOINTMENT (OUTPATIENT)
Dept: SPEECH THERAPY | Facility: CLINIC | Age: 64
DRG: 270 | End: 2024-04-11
Payer: COMMERCIAL

## 2024-04-11 LAB
ACANTHOCYTES BLD QL SMEAR: SLIGHT
ANION GAP SERPL CALCULATED.3IONS-SCNC: 9 MMOL/L (ref 7–15)
BASOPHILS # BLD AUTO: ABNORMAL 10*3/UL
BASOPHILS # BLD MANUAL: 0.1 10E3/UL (ref 0–0.2)
BASOPHILS NFR BLD AUTO: ABNORMAL %
BASOPHILS NFR BLD MANUAL: 1 %
BUN SERPL-MCNC: 45.9 MG/DL (ref 8–23)
CALCIUM SERPL-MCNC: 7.4 MG/DL (ref 8.8–10.2)
CHLORIDE SERPL-SCNC: 93 MMOL/L (ref 98–107)
CREAT SERPL-MCNC: 2.56 MG/DL (ref 0.51–0.95)
DEPRECATED HCO3 PLAS-SCNC: 26 MMOL/L (ref 22–29)
EGFRCR SERPLBLD CKD-EPI 2021: 20 ML/MIN/1.73M2
ELLIPTOCYTES BLD QL SMEAR: SLIGHT
EOSINOPHIL # BLD AUTO: ABNORMAL 10*3/UL
EOSINOPHIL # BLD MANUAL: 0.2 10E3/UL (ref 0–0.7)
EOSINOPHIL NFR BLD AUTO: ABNORMAL %
EOSINOPHIL NFR BLD MANUAL: 4 %
ERYTHROCYTE [DISTWIDTH] IN BLOOD BY AUTOMATED COUNT: 15.6 % (ref 10–15)
GLUCOSE BLDC GLUCOMTR-MCNC: 77 MG/DL (ref 70–99)
GLUCOSE BLDC GLUCOMTR-MCNC: 80 MG/DL (ref 70–99)
GLUCOSE BLDC GLUCOMTR-MCNC: 92 MG/DL (ref 70–99)
GLUCOSE SERPL-MCNC: 74 MG/DL (ref 70–99)
HCT VFR BLD AUTO: 24.6 % (ref 35–47)
HGB BLD-MCNC: 8.4 G/DL (ref 11.7–15.7)
IMM GRANULOCYTES # BLD: ABNORMAL 10*3/UL
IMM GRANULOCYTES NFR BLD: ABNORMAL %
INR PPP: 1.31 (ref 0.85–1.15)
LYMPHOCYTES # BLD AUTO: ABNORMAL 10*3/UL
LYMPHOCYTES # BLD MANUAL: 1 10E3/UL (ref 0.8–5.3)
LYMPHOCYTES NFR BLD AUTO: ABNORMAL %
LYMPHOCYTES NFR BLD MANUAL: 19 %
MAGNESIUM SERPL-MCNC: 1.8 MG/DL (ref 1.7–2.3)
MCH RBC QN AUTO: 29.3 PG (ref 26.5–33)
MCHC RBC AUTO-ENTMCNC: 34.1 G/DL (ref 31.5–36.5)
MCV RBC AUTO: 86 FL (ref 78–100)
METAMYELOCYTES # BLD MANUAL: 0.1 10E3/UL
METAMYELOCYTES NFR BLD MANUAL: 1 %
MONOCYTES # BLD AUTO: ABNORMAL 10*3/UL
MONOCYTES # BLD MANUAL: 0.3 10E3/UL (ref 0–1.3)
MONOCYTES NFR BLD AUTO: ABNORMAL %
MONOCYTES NFR BLD MANUAL: 6 %
NEUTROPHILS # BLD AUTO: ABNORMAL 10*3/UL
NEUTROPHILS # BLD MANUAL: 3.5 10E3/UL (ref 1.6–8.3)
NEUTROPHILS NFR BLD AUTO: ABNORMAL %
NEUTROPHILS NFR BLD MANUAL: 69 %
NRBC # BLD AUTO: 0 10E3/UL
NRBC # BLD AUTO: 0.1 10E3/UL
NRBC BLD AUTO-RTO: 0 /100
NRBC BLD MANUAL-RTO: 1 %
PHOSPHATE SERPL-MCNC: 3.7 MG/DL (ref 2.5–4.5)
PLAT MORPH BLD: ABNORMAL
PLATELET # BLD AUTO: 151 10E3/UL (ref 150–450)
POTASSIUM SERPL-SCNC: 4.6 MMOL/L (ref 3.4–5.3)
POTASSIUM SERPL-SCNC: 4.7 MMOL/L (ref 3.4–5.3)
RBC # BLD AUTO: 2.87 10E6/UL (ref 3.8–5.2)
RBC MORPH BLD: ABNORMAL
SODIUM SERPL-SCNC: 128 MMOL/L (ref 135–145)
TARGETS BLD QL SMEAR: SLIGHT
WBC # BLD AUTO: 5 10E3/UL (ref 4–11)

## 2024-04-11 PROCEDURE — 85007 BL SMEAR W/DIFF WBC COUNT: CPT | Performed by: STUDENT IN AN ORGANIZED HEALTH CARE EDUCATION/TRAINING PROGRAM

## 2024-04-11 PROCEDURE — 99231 SBSQ HOSP IP/OBS SF/LOW 25: CPT | Mod: 24 | Performed by: SURGERY

## 2024-04-11 PROCEDURE — 97168 OT RE-EVAL EST PLAN CARE: CPT | Mod: GO

## 2024-04-11 PROCEDURE — 250N000013 HC RX MED GY IP 250 OP 250 PS 637: Performed by: STUDENT IN AN ORGANIZED HEALTH CARE EDUCATION/TRAINING PROGRAM

## 2024-04-11 PROCEDURE — 94640 AIRWAY INHALATION TREATMENT: CPT

## 2024-04-11 PROCEDURE — 250N000009 HC RX 250: Performed by: STUDENT IN AN ORGANIZED HEALTH CARE EDUCATION/TRAINING PROGRAM

## 2024-04-11 PROCEDURE — 85027 COMPLETE CBC AUTOMATED: CPT | Performed by: STUDENT IN AN ORGANIZED HEALTH CARE EDUCATION/TRAINING PROGRAM

## 2024-04-11 PROCEDURE — 97530 THERAPEUTIC ACTIVITIES: CPT | Mod: GO

## 2024-04-11 PROCEDURE — 82565 ASSAY OF CREATININE: CPT | Performed by: STUDENT IN AN ORGANIZED HEALTH CARE EDUCATION/TRAINING PROGRAM

## 2024-04-11 PROCEDURE — 99232 SBSQ HOSP IP/OBS MODERATE 35: CPT | Performed by: STUDENT IN AN ORGANIZED HEALTH CARE EDUCATION/TRAINING PROGRAM

## 2024-04-11 PROCEDURE — 94640 AIRWAY INHALATION TREATMENT: CPT | Mod: 76

## 2024-04-11 PROCEDURE — 84100 ASSAY OF PHOSPHORUS: CPT | Performed by: STUDENT IN AN ORGANIZED HEALTH CARE EDUCATION/TRAINING PROGRAM

## 2024-04-11 PROCEDURE — 120N000001 HC R&B MED SURG/OB

## 2024-04-11 PROCEDURE — 84132 ASSAY OF SERUM POTASSIUM: CPT

## 2024-04-11 PROCEDURE — 83735 ASSAY OF MAGNESIUM: CPT | Performed by: STUDENT IN AN ORGANIZED HEALTH CARE EDUCATION/TRAINING PROGRAM

## 2024-04-11 PROCEDURE — 999N000157 HC STATISTIC RCP TIME EA 10 MIN

## 2024-04-11 PROCEDURE — 82374 ASSAY BLOOD CARBON DIOXIDE: CPT | Performed by: STUDENT IN AN ORGANIZED HEALTH CARE EDUCATION/TRAINING PROGRAM

## 2024-04-11 PROCEDURE — 85610 PROTHROMBIN TIME: CPT

## 2024-04-11 PROCEDURE — 92526 ORAL FUNCTION THERAPY: CPT | Mod: GN | Performed by: SPEECH-LANGUAGE PATHOLOGIST

## 2024-04-11 PROCEDURE — 99232 SBSQ HOSP IP/OBS MODERATE 35: CPT | Performed by: INTERNAL MEDICINE

## 2024-04-11 RX ADMIN — CARVEDILOL 6.25 MG: 6.25 TABLET, FILM COATED ORAL at 19:00

## 2024-04-11 RX ADMIN — Medication 1 CAPSULE: at 08:57

## 2024-04-11 RX ADMIN — SODIUM CHLORIDE TAB 1 GM 2 G: 1 TAB at 08:57

## 2024-04-11 RX ADMIN — ALBUTEROL SULFATE 2.5 MG: 2.5 SOLUTION RESPIRATORY (INHALATION) at 19:19

## 2024-04-11 RX ADMIN — NICOTINE 1 PATCH: 14 PATCH, EXTENDED RELEASE TRANSDERMAL at 09:00

## 2024-04-11 RX ADMIN — HYDROMORPHONE HYDROCHLORIDE 2 MG: 2 TABLET ORAL at 02:04

## 2024-04-11 RX ADMIN — BETAMETHASONE DIPROPIONATE: 0.5 CREAM TOPICAL at 09:01

## 2024-04-11 RX ADMIN — SODIUM CHLORIDE TAB 1 GM 2 G: 1 TAB at 12:44

## 2024-04-11 RX ADMIN — SODIUM CHLORIDE TAB 1 GM 2 G: 1 TAB at 19:00

## 2024-04-11 RX ADMIN — FLUTICASONE FUROATE AND VILANTEROL TRIFENATATE 1 PUFF: 200; 25 POWDER RESPIRATORY (INHALATION) at 08:58

## 2024-04-11 RX ADMIN — AMLODIPINE BESYLATE 5 MG: 5 TABLET ORAL at 08:57

## 2024-04-11 RX ADMIN — ALBUTEROL SULFATE 2.5 MG: 2.5 SOLUTION RESPIRATORY (INHALATION) at 07:59

## 2024-04-11 RX ADMIN — HYDROMORPHONE HYDROCHLORIDE 4 MG: 2 TABLET ORAL at 09:55

## 2024-04-11 RX ADMIN — CARVEDILOL 6.25 MG: 6.25 TABLET, FILM COATED ORAL at 08:57

## 2024-04-11 RX ADMIN — BETAMETHASONE DIPROPIONATE: 0.5 CREAM TOPICAL at 21:43

## 2024-04-11 RX ADMIN — ALBUTEROL SULFATE 2.5 MG: 2.5 SOLUTION RESPIRATORY (INHALATION) at 01:54

## 2024-04-11 RX ADMIN — Medication 1 PACKET: at 12:43

## 2024-04-11 RX ADMIN — HYDROMORPHONE HYDROCHLORIDE 4 MG: 2 TABLET ORAL at 15:44

## 2024-04-11 ASSESSMENT — ACTIVITIES OF DAILY LIVING (ADL)
ADLS_ACUITY_SCORE: 35
ADLS_ACUITY_SCORE: 39
ADLS_ACUITY_SCORE: 39
PREVIOUS_RESPONSIBILITIES: MEAL PREP;HOUSEKEEPING;LAUNDRY;MEDICATION MANAGEMENT;FINANCES
ADLS_ACUITY_SCORE: 34
ADLS_ACUITY_SCORE: 35
ADLS_ACUITY_SCORE: 39
ADLS_ACUITY_SCORE: 35
ADLS_ACUITY_SCORE: 35
ADLS_ACUITY_SCORE: 38
ADLS_ACUITY_SCORE: 39
ADLS_ACUITY_SCORE: 38
ADLS_ACUITY_SCORE: 34
ADLS_ACUITY_SCORE: 40
ADLS_ACUITY_SCORE: 39
ADLS_ACUITY_SCORE: 38
ADLS_ACUITY_SCORE: 34
ADLS_ACUITY_SCORE: 38
ADLS_ACUITY_SCORE: 35
ADLS_ACUITY_SCORE: 39
ADLS_ACUITY_SCORE: 39
ADLS_ACUITY_SCORE: 35

## 2024-04-11 NOTE — PLAN OF CARE
Goal Outcome Evaluation:      Plan of Care Reviewed With: patient    Overall Patient Progress: improvingOverall Patient Progress: improving    Goal Outcome Evaluation: Date & Time:  4/10/24 [id number]  Surgery/POD#: POD 8 from R AKA, POD #1 R AKA closure  Behavior & Aggression: Green   Fall Risk: Yes   Orientation: A&Ox4   ABNL VS/O2:VSS   Pain Management:  oral Dilaudid x 1   Abn labs:  sodium 130,  BG 80, 92  Bowel/Bladder: On HD, x1 small/medium loose BM during shift , pt requested purewick as she has been voiding.  Drains: RUE double lumen PICC infusing NS @ 1Oml/hr   Wounds/incisions: R AKA wrapped with serosang drainage, L groin and R abd site CDI   Diet:clears, pt on renal diet, No free water due to low sodium. Pt unhappy she is not able to drink water.   Activity Level: Ax2 lift, T/R, pt refuses at times. Redness noted on coccyx, dressing in place. Writer updated MD via sticky notes and charge nurse aware.   Tests/Procedures:  monitoring sodium, hemodialysis today  Anticipated  DC Date: Pending   Right HD port CDI. R AKA elevated, dressing with serosang drainage noted, pulses palpable. Neuros WNL.

## 2024-04-11 NOTE — PROGRESS NOTES
Care Management Follow Up    Length of Stay (days): 13    Expected Discharge Date: 04/13/2024     Concerns to be Addressed:       Patient plan of care discussed at interdisciplinary rounds: Yes    Anticipated Discharge Disposition: Transitional Care, Skilled Nursing Facility     Anticipated Discharge Services:    Anticipated Discharge DME:      Patient/family educated on Medicare website which has current facility and service quality ratings:    Education Provided on the Discharge Plan:    Patient/Family in Agreement with the Plan: yes    Referrals Placed by CM/SW:    Private pay costs discussed: Not applicable    Additional Information:  Patients daughter Malu met with writer and asked for updates regarding patient. Writer explained ARU and provided a brochure. Malu is very concerned about the patient and asked for a psych consult or possible care conference. Miriam feels patient is not improving and would not want dialysis long term.   Writer met with patient and daughter in the room and did not feel a psych consult is warranted as patient is complying with PT/OT and agreeable with discharge planning, patient denies feeling depressed or having a history of needing psych medications. Patients spouse is moving in with his sister. Patient seems completely fine with this. Malu is involved and would like to be in the loop with discharge planning, Miriam provided writer with updated HCD naming herself and patients sister Yue as decision makers. Writer forwarded to honoring choices. Care coordination is following and will continue to update.     DAWN Herrera

## 2024-04-11 NOTE — PLAN OF CARE
Date & Time: 4/11/24 [id number]  Surgery/POD#: POD#9 from R AKA; POD#2 R AKA closure   Behavior & Aggression: green  Fall Risk: Yes  Orientation: A&O x 4  ABNL VS/O2: VSS on 2.5 L NC, elevated /110  ABNL Labs: Na 128. BG 92, 77  Pain Management: oral dilaudid x1 for pain in leg.  Bowel/Bladder: on hemodialysis, incontinent of bowel.   Drains: RUE double lumen PICC infusing at 15ml/hr. Internal jugular dialysis access.  Wounds/incisions: R thigh bruise. R and L groin sites.CDI   Diet: renal diet, calorie count; no free water FR 1200  Activity Level: Ax2 lift, T/R, refuses repositioning.  Tests/Procedures: N/A  Anticipated  DC Date: pending  Significant Information: Vascular/Neph following. Can be agitated at times and noncompliant.

## 2024-04-11 NOTE — PROGRESS NOTES
Nursing note  Pt a/o x 4,  but forgetful. Pt is POD # 9 of  R AKA and POD # 2 of flap closure T/R Q2hrs last turned at 1715. Pt c/o pain rating it 9/10, prn dilaudid po 4 mg given. PICC on the right upper arm patent ivf going at TKO(15ml/hr). Pt denies any n/v. Pt  is incontinent to stool and had loose stool this evening. Assist with 2 on repositioning. Pt is a lift.Scattered bruising/ecchymosis. Bilateral groin angio site c/d/I. Swollen from around the hip down to the BLES. Right stump and LLE elevated on the pillow.s. lydia area and coccyx reddened. Mepilex applied on the coccyx. On 2.5 L of 02 via NC. Dialysis cath on the right chest. Pt on calorie count, BGM at 1700 was 101. Right stump dressing intact with dried blood on it.PT/OT/vascular/neph following. Will continue to monitor.
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
## 2024-04-11 NOTE — PROGRESS NOTES
" Renal Medicine Progress Note            Assessment/Plan:     # Patient with a creatinine of 0.45-0.9 mg/dl at baseline.      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               -dialysis dependent     # Ischemic RLE due to occluded bypass graft   -AKA due to unsalvagable limb 04/01/24     # Anemia and thrombocytopenia:  Had blood transfusion yesterday.      # FEN: 2+ pitting edema. Hypervolemia hypernatremia. Should improve with UF and dialysis. Hyperkalemia-should improved with HD. Corrected calcium is normal at ~ 9.5        Plan:  # Hemodialysis order placed for tomorrow. Will try to UF more tomorrow.   # Okay with 1200 ml of fluid/water restriction.         Interval History:     Afebrile. VSS.   She wants to know if she could have ice during HD tx tomorrow.  Patient is on \"No free water\"?   Okay to be on 1200 ml of fluid/water restriction.   Anuric per record.           Medications and Allergies:     Current Facility-Administered Medications   Medication Dose Route Frequency Provider Last Rate Last Admin    - MEDICATION INSTRUCTIONS for Dialysis Patients -   Does not apply See Admin Instructions Marcin White MD        albuterol (PROVENTIL) neb solution 2.5 mg  2.5 mg Nebulization Q6H Marcin White MD   2.5 mg at 04/11/24 0759    amLODIPine (NORVASC) tablet 5 mg  5 mg Oral Daily Marcin White MD   5 mg at 04/11/24 0857    betamethasone dipropionate (DIPROSONE) 0.05 % cream   Topical BID Marcin White MD   Given at 04/11/24 0901    carvedilol (COREG) tablet 6.25 mg  6.25 mg Oral BID w/meals Marcin White MD   6.25 mg at 04/11/24 0857    Contraindications to both pharmacological and mechanical prophylaxis (must document contraindications for both in this order)   Does not apply See Admin Instructions Marcin White MD        famotidine (PEPCID) injection 20 mg  20 mg Intravenous Q48H Marcin White MD   20 mg at 04/10/24 0558    fluticasone-vilanterol (BREO ELLIPTA) 200-25 MCG/ACT " "inhaler 1 puff  1 puff Inhalation Daily Marcin White MD   1 puff at 04/11/24 0858    insulin aspart (NovoLOG) injection (RAPID ACTING)  1-7 Units Subcutaneous TID AC Marcin White MD        insulin aspart (NovoLOG) injection (RAPID ACTING)  1-5 Units Subcutaneous At Bedtime aMrcin White MD        Rigo PACK 1 packet  1 packet Oral BID 09 12 Gala Morales,    1 packet at 04/11/24 1243    multivitamin RENAL (TRIPHROCAPS) capsule 1 capsule  1 capsule Oral Daily Gala Morales DO   1 capsule at 04/11/24 0857    nicotine (NICODERM CQ) 14 MG/24HR 24 hr patch 1 patch  1 patch Transdermal Daily Marcin White MD   1 patch at 04/11/24 0900    sodium chloride (PF) 0.9% PF flush 10-40 mL  10-40 mL Intracatheter Q8H Sofia Cristobal MD   10 mL at 04/11/24 0643    sodium chloride (PF) 0.9% PF flush 3 mL  3 mL Intracatheter Q8H Kaylee Liu NP   3 mL at 04/11/24 0859    sodium chloride tablet 2 g  2 g Oral TID w/meals Gala Morales DO   2 g at 04/11/24 1244        Allergies   Allergen Reactions    Pantoprazole      Protonix caused diffuse edema    Chantix [Varenicline]      Terrible dreams    Contrast Dye Swelling     Patient reports facial and throat swelling with prior CT contrast.    Penicillins Itching and Rash            Physical Exam:   Vitals were reviewed   , Blood pressure (!) 142/110, pulse 78, temperature 98.2  F (36.8  C), temperature source Oral, resp. rate 16, height 1.549 m (5' 1\"), weight 53.9 kg (118 lb 13.3 oz), SpO2 92%, not currently breastfeeding.    Wt Readings from Last 3 Encounters:   04/09/24 53.9 kg (118 lb 13.3 oz)   01/08/24 49.8 kg (109 lb 12.8 oz)   12/12/23 50.8 kg (112 lb)       Intake/Output Summary (Last 24 hours) at 4/11/2024 1348  Last data filed at 4/11/2024 0900  Gross per 24 hour   Intake 1932 ml   Output --   Net 1932 ml     GENERAL APPEARANCE: Frail.   HEENT:  Eyes/ears/nose/neck grossly normal  RESP: lungs cta b c good efforts, no crackles, rhonchi or wheezes  CV: " RRR  ABDOMEN: Soft, NT  EXTREMITIES/SKIN:R AKA.  2+ pitting edema.   NEURO: Awake, alert and conversing normally  R TDC CDI         Data:     CBC RESULTS:     Recent Labs   Lab 04/11/24  0643 04/09/24  1817 04/09/24  0636 04/08/24  0631 04/07/24 2058 04/07/24  0628   WBC 5.0 3.9* 3.9* 6.5 6.7 6.9   RBC 2.87* 3.03* 2.37* 3.01* 2.87* 3.03*   HGB 8.4* 9.0* 6.7* 8.7* 8.0* 8.8*   HCT 24.6* 26.0* 20.0* 25.1* 23.9* 25.4*    105* 48* 55* 38* 38*       Basic Metabolic Panel:  Recent Labs   Lab 04/11/24  1225 04/11/24  0800 04/11/24  0643 04/11/24  0240 04/10/24  2156 04/10/24  1835 04/10/24  1324 04/10/24  0705 04/10/24  0151 04/10/24  0014 04/09/24  2121 04/09/24  1817 04/09/24  1212 04/09/24  0636   NA  --   --  128*  --   --  130*  --  124*  --  122*  --  124*  --  127*   POTASSIUM  --   --  4.6  4.7  --   --  4.6  --  5.9*  --  5.7*  --  5.5*  --  4.9   CHLORIDE  --   --  93*  --   --  94*  --  91*  --  89*  --  91*  --  94*   CO2  --   --  26  --   --  25  --  20*  --  22  --  23  --  25   BUN  --   --  45.9*  --   --  34.8*  --  66.8*  --  60.5*  --  54.0*  --  48.3*   CR  --   --  2.56*  --   --  2.09*  --  3.20*  --  3.07*  --  2.84*  --  2.66*   GLC 77 92 74 80 92 79   < > 90   < > 107*   < > 112*   < > 78   SONIA  --   --  7.4*  --   --  7.5*  --  7.7*  --  7.6*  --  7.4*  --  7.1*    < > = values in this interval not displayed.       INR  Recent Labs   Lab 04/11/24  0643   INR 1.31*      Attestation:   I have reviewed today's relevant vital signs, notes, medications, labs and imaging.    Torrey Alegria MD  [facility] - Nephrology  Office phone :[phone]  Pager: [phone]

## 2024-04-11 NOTE — PROGRESS NOTES
Care Management Follow Up    Length of Stay (days): 13    Expected Discharge Date: 04/13/2024     Concerns to be Addressed:       Patient plan of care discussed at interdisciplinary rounds: Yes    Anticipated Discharge Disposition: Transitional Care, Skilled Nursing Facility, ARU     Anticipated Discharge Services:  outpatient dialysis  Anticipated Discharge DME:      Patient/family educated on Medicare website which has current facility and service quality ratings:    Education Provided on the Discharge Plan:  yes  Patient/Family in Agreement with the Plan: yes    Referrals Placed by CM/SW:  ARU and outpatient [facility]  Private pay costs discussed: Not applicable    Additional Information:  Follow up done with patient. Discussed referral to ARU and [facility]. Patient in agreement and referrals sent via DOD.     Leonor Mcguire RN   [facility]   Phone [phone], Vocera or [phone]

## 2024-04-11 NOTE — PROGRESS NOTES
[facility]    Medicine Progress Note - Hospitalist Service    Date of Admission:  3/29/2024  Date of Service: 04/11/2024    Assessment & Plan     Shirley Hendricks is a 63 year old female admitted on 3/29/2024 due to occlusion of right LE bypass graft.      Past medical history significant for PAD/PVD, Tobacco use D/O, CAD (history of MI x3), HTN, HLP, DM2, Neuropathy, COPD, GERD, Anemia, Spongiotic dermatitis, MDD with anxiety, Chronic anticoagulation therapy with history of DVT/PE, OA, Charcot-Breonna-Tooth foot deformity, Marginal zone B-cell lymphoma, History of SBO, History of Takotsubo CM, History of SVT.    Discussed with Dr. Mazariegos and reviewed ED notes and Vascular Surgery consult note.  Patient presented to the ED due to right leg pain that had begun ~ 1 hour prior to presentation.  She reported pain seems to worsen with walking and when she attempted to check a pulse in her leg it was absent.  She also described that the foot is colder than the left.      While in the lobby attempts to find right pedal pulse with doppler were unsuccessful.  Dr. Lozano of Vascular Surgery was contacted by the patient as well as Dr. Mazariegos.      Work-up completed while patient was in the lobby included right arterial LE US revealed the right common femoral artery to mid anterior tibial artery bypass graft occlusion.      After assessing patient labs were collected and included a CMP that revealed a sodium of 132, chloride of 97, CO2 of 21 and glucose of 103 otherwise within normal limits.  CBC with differential revealed an RDW of 15.7 otherwise unremarkable.    Right common femoral artery to mid anterior tibial artery bypass graft acute occlusion  PAD/PVD  Acute Blood Loss Anemia  *Occurred despite low-dose Xarelto and Plavix therapy.    - Vascular surgery consult requested -> urgent guillotine right above knee amputation today followed by close monitoring in the ICU.    - Tentative plan  to return to OR today for formalization of AKA with Dr. Diaz   - IR was consulted and following  --Per Vascular surgery consult Dr. Salomon was contacted and s/p right LE angiogram and lytic therapy 03/30 -> 03/31 took the patient to the angio suite to remove the sheath in the left groin with closure device.   --IR now signed off  - Hold PTA Xarelto 15 mg nightly.    - Defer resumption of PTA Plavix 75 mg/d to Vascular Surgery and/or IR -> plavix to restart once platelets improve, continue to monitor bypass in LLE   - Statin and antihypertensive management as below.    - Pain management as needed  - Follow Hgb daily  - transfused 1 U PRBCs 03/30 / 03/31 and 04/02 and 04/06 and 04/09 and closely monitor.  - CBC Q12H, transfuse as needed Hgb < 7.0  - PT / OT eval -> TCU   - SW consulted    Rhabdomyolysis   ARF  Acute Hyponatremia  Assessment: CPK has been rising. Her serum creatinine and body urea nitrogen also rising. Renal US -> No obstruction demonstrated.   Plan:  - Follow CK / BMP daily -> Cr stable  - Nephrology following -> now on HD, Decreased BP following dilaudid for pain and 25% albumin ordered with improvement in BPs first day of HD  - Hyponatremia mgtmt per renal service -> improving  - Avoid NSAIDs / nephrotoxins    Colon distention  Abdominal Pain  Assessment:CT abdomen 04/03 -> Medium-sized right retroperitoneal and extraperitoneal hematoma. Evaluation for extravasation is unable to be performed without contrast. This is likely similar to slightly smaller than the CT lumbar spine although this is incompletely visualized at   that time  Extensive pulmonary opacities. Differential: Multifocal infection, ARDS, and/or pulmonary  Plan:  --NG placed -> remove in coming days as GI issues resolve -> now removed  --Can stop TFs -> Dialysis diet -> ADAT  --Hold Anticoagulation for now given anemia  --GI following ->  consider flex sig/colonoscopy as outpatient.  --Repeat flat AXR 4/5: Nonobstructive bowel gas  pattern. Gaseous distention of the colon. Enteric feeding tube tip in the fourth portion of the duodenum. Iliac stents, cholecystectomy clips, and endoscopic clips in the right hemiabdomen.   --Continue to monitor  --Pain control as needed    MDD with anxiety  Acute Delirium   *Noted on chart review.  No interventions.    Delirium from acute illness  Plan:  Delirium has mostly resolved  Continue to maintain delirium precautions.   IM Zyprexa as needed    Contrast dye allergy  - Ordered solumedrol and benadryl per contrast dye allergy protocol.      Mild hyponatremia  - BMP daily    Tobacco Use D/O   Patient currently smokes max 5 cigarettes per day.     - Counseled regarding smoking cessation that should also continue with PCP.    - Nicoderm patch ordered.    Recent celiac disease  *Patient reported recent GI work-up revealed she has celiac disease.    - Once diet can be advanced would request no gluten/celiac diet.      CAD (history of MI x3)  HTN  HLP  *Last coronary angiogram completed 10/2023.    - Resumed on PTA Coreg 6.25 mg BID, hold lisinopril 10 mg/d due to LIMA, continue Norvasc 2.5 mg/d.  Hold parameters in place.    - hold PTA rosuvastatin 40 mg/d due to acute rhabdo  - PRN IV hydralazine 10 mg every 4 hours for SBP GREATER THAN 180.      History of Takotsubo CM  *Recovered EF (55-60%) from ECHO 11/2023.    - Resumed on PTA Coreg 6.25 mg BID (hold for SBP < 110), hold lisinopril 10 mg/d and  hold Jardiance 10 mg/d For now given ARF    Type 2 DM  Diabetic neuropathy  *Noted diagnosis on chart review.  However, A1c of 5.2%.    - Hold PTA Jardiance 10 mg/d and gabapentin 100 mg TID due to AMS.  - No insulin as borderline hypoglycemic    COPD   - Resumed on PTA inhalers.    - Encourage use of Flutter valve/IS.      GERD  - Resumed on PTA omeprazole 20 mg/d.      Anemia  Recent GI bleed (12/2023 and admitted at [facility])  - Hold PTA Iron supplements and resume at discharge.    - CBC daily      Spongiotic  dermatitis  *Recently seen at outpatient Derm.    - Resumed on PTA betamethasone cream BID.      Chronic anticoagulation therapy with history of DVT/PE  - Hold PTA Xarelto.      OA  - Pain management as above.      Charcot-Breonna-Tooth foot deformity  *Noted on chart review.  No interventions.     Stage VALENTIN marginal zone B-cell lymphoma  *Follows with [facility] (Dr. Barrow).    - Continue outpatient surveillance (CT scan in 8/2024).      History of SBO  *Noted on chart review.  No interventions.    History of SVT  - Resumed on PTA Coreg as above.            Diet: Calorie Counts  Snacks/Supplements Adult: Gelatein Plus; Between Meals  Snacks/Supplements Adult: Ensure Max Protein (bariatric); With Meals  Snacks/Supplements Adult: Ensure Enlive; With Meals  Combination Diet Gluten Free Diet; Renal Diet (dialysis)  Fluid restriction 1200 ML FLUID    DVT Prophylaxis: Pneumatic Compression Devices  Alvarez Catheter: Not present  Lines: PRESENT      PICC 03/31/24 Double Lumen Right Brachial vein medial access-Site Assessment: WDL  CVC Double Lumen Right Internal jugular Tunneled-Site Assessment: WDL      Cardiac Monitoring: None  Code Status: Full Code      Clinically Significant Risk Factors        # Hyperkalemia: Highest K = 5.9 mmol/L in last 2 days, will monitor as appropriate  # Hyponatremia: Lowest Na = 122 mmol/L in last 2 days, will monitor as appropriate      # Hypoalbuminemia: Lowest albumin = 1.8 g/dL at 4/9/2024  6:36 AM, will monitor as appropriate  # Coagulation Defect: INR = 1.31 (Ref range: 0.85 - 1.15) and/or PTT = N/A, will monitor for bleeding  # Thrombocytopenia: Lowest platelets = 105 in last 2 days, will monitor for bleeding   # Hypertension: Noted on problem list         # Severe Malnutrition: based on nutrition assessment    # Financial/Environmental Concerns:           Disposition Plan      Expected Discharge Date: 04/13/2024        Discharge Comments: TF, R amp, PT/OT  Neph=[email or web address]  Wound Vac           Marcin White MD  Hospitalist Service  [facility]  Securely message with [employer] (more info)  Text page via [employer] Paging/Directory   ______________________________________________________________________    Interval History     No acute events overnight  Resting in bed.  Main complaint continues to be post op pain, but controlled with current regimen  No fevers  No new CP/SOB   No nausea / vomiting  No new complaints otherwise  RN reports that she can be agitated at times and noncompliant.     Physical Exam   Vital Signs: Temp: 98.2  F (36.8  C) Temp src: Oral BP: (!) 142/110 Pulse: 78   Resp: 16 SpO2: 92 % O2 Device: Nasal cannula Oxygen Delivery: 2.5 LPM  Weight: 118 lbs 13.25 oz      Constitutional: Awake, alert, no apparent distress.    Pulmonary: CTABL  Cardiovascular: Regular rate and rhythm, normal S1 and S2, no S3 or S4, and no murmur noted. 2+ pitting edema bilaterally.  GI: Normal bowel sounds, soft, non-distended, non-tender.    Neuro: Fahad but has sensory loss and is not able to wiggle toes but is able to plantarflex and dorsiflex.   Psych:  Alert and oriented x3. Fahad. Normal affect.  Extremities: Right AKA wound with dressing.  ----------------------------------------------------------------------------------------    Medical Decision Making       40 MINUTES SPENT BY ME on the date of service doing chart review, history, exam, documentation & further activities per the note.      Data   ------------------------- PAST 24 HR DATA REVIEWED -----------------------------------------------    I have personally reviewed the following data over the past 24 hrs:    5.0  \   8.4 (L)   / 151     128 (L) 93 (L) 45.9 (H) /  77   4.7; 4.6 26 2.56 (H) \     INR:  1.31 (H) PTT:  N/A   D-dimer:  N/A Fibrinogen:  N/A       Imaging results reviewed over the past 24 hrs:   No results found for this or any previous visit (from the past 24 hour(s)).    ------------------------- ENCOUNTER LABS  ----------------------------------------------------------------  Recent Labs   Lab 04/11/24  1225 04/11/24  0800 04/11/24  0643 04/10/24  2156 04/10/24  1835 04/10/24  1324 04/10/24  0705 04/09/24  2121 04/09/24  1817 04/09/24  1212 04/09/24  0636   WBC  --   --  5.0  --   --   --   --   --  3.9*  --  3.9*   HGB  --   --  8.4*  --   --   --   --   --  9.0*  --  6.7*   MCV  --   --  86  --   --   --   --   --  86  --  84   PLT  --   --  151  --   --   --   --   --  105*  --  48*   INR  --   --  1.31*  --   --   --   --   --   --   --   --    NA  --   --  128*  --  130*  --  124*   < > 124*  --  127*   POTASSIUM  --   --  4.6  4.7  --  4.6  --  5.9*   < > 5.5*  --  4.9   CHLORIDE  --   --  93*  --  94*  --  91*   < > 91*  --  94*   CO2  --   --  26  --  25  --  20*   < > 23  --  25   BUN  --   --  45.9*  --  34.8*  --  66.8*   < > 54.0*  --  48.3*   CR  --   --  2.56*  --  2.09*  --  3.20*   < > 2.84*  --  2.66*   ANIONGAP  --   --  9  --  11  --  13   < > 10  --  8   SONIA  --   --  7.4*  --  7.5*  --  7.7*   < > 7.4*  --  7.1*   GLC 77 92 74   < > 79   < > 90   < > 112*   < > 78   ALBUMIN  --   --   --   --   --   --   --   --   --   --  1.8*   PROTTOTAL  --   --   --   --   --   --   --   --   --   --  3.9*   BILITOTAL  --   --   --   --   --   --   --   --   --   --  0.9   ALKPHOS  --   --   --   --   --   --   --   --   --   --  155*   ALT  --   --   --   --   --   --   --   --   --   --  53*   AST  --   --   --   --   --   --   --   --   --   --  68*    < > = values in this interval not displayed.       Most Recent 3 CBC's:  Recent Labs   Lab Test 04/11/24  0643 04/09/24  1817 04/09/24  0636   WBC 5.0 3.9* 3.9*   HGB 8.4* 9.0* 6.7*   MCV 86 86 84    105* 48*     Most Recent 3 BMP's:  Recent Labs   Lab Test 04/11/24  1225 04/11/24  0800 04/11/24  0643 04/10/24  2156 04/10/24  1835 04/10/24  1324 04/10/24  0705   NA  --   --  128*  --  130*  --  124*   POTASSIUM  --   --  4.6  4.7  --  4.6  --  5.9*    CHLORIDE  --   --  93*  --  94*  --  91*   CO2  --   --  26  --  25  --  20*   BUN  --   --  45.9*  --  34.8*  --  66.8*   CR  --   --  2.56*  --  2.09*  --  3.20*   ANIONGAP  --   --  9  --  11  --  13   SONIA  --   --  7.4*  --  7.5*  --  7.7*   GLC 77 92 74   < > 79   < > 90    < > = values in this interval not displayed.     Most Recent 2 LFT's:  Recent Labs   Lab Test 04/09/24  0636 04/04/24  0541   AST 68* 301*   ALT 53* 326*   ALKPHOS 155* 192*   BILITOTAL 0.9 0.6     Most Recent 3 INR's:  Recent Labs   Lab Test 04/11/24  0643 10/07/23  0516 10/06/23  0551   INR 1.31* 1.38* 1.08     Most Recent 3 Troponin's:  Recent Labs   Lab Test 06/10/20  1243 02/16/20  1824 09/18/19  0739   TROPI <0.015 <0.015 <0.015     Most Recent 3 BNP's:No lab results found.  Most Recent D-dimer:  Recent Labs   Lab Test 08/13/21  0934   DD 10.38*     Most Recent Cholesterol Panel:  Recent Labs   Lab Test 10/03/23  0941   CHOL 137   LDL 69   HDL 54   TRIG 68     Most Recent 6 Bacteria Isolates From Any Culture (See EPIC Reports for Culture Details):No lab results found.  Most Recent TSH and T4:  Recent Labs   Lab Test 04/10/24  0014 02/04/19  0934 03/28/17  0922   TSH 2.53   < > 1.37   T4  --   --  1.19    < > = values in this interval not displayed.     Most Recent Urinalysis:  Recent Labs   Lab Test 02/04/19  0935   COLOR Yellow   APPEARANCE Clear   URINEGLC Negative   URINEBILI Negative   URINEKETONE Negative   SG 1.010   UBLD Negative   URINEPH 6.0   PROTEIN Negative   UROBILINOGEN 0.2   NITRITE Negative   LEUKEST Negative   RBCU O - 2   WBCU 0 - 5     Most Recent ESR & CRP:  Recent Labs   Lab Test 11/13/23  1705   SED 39*

## 2024-04-11 NOTE — PLAN OF CARE
Date & Time: 4/10/24  1042-3025    Surgery/POD#: POD#9 from R AKA; POD#2 R AKA closure     Behavior & Aggression: green  Fall Risk: yes  Orientation: A&O x 4  ABNL VS/O2: VSS on 2.5 L NC, ex. HTN  ABNL Labs: Na 130  Pain Management: oral dilaudid x1 for pain in leg.  Bowel/Bladder: on hemodialysis, incontinent of bowel. Sm BM this AM.  Drains: RUE double lumen PICC, internal jugular dialysis access.  Wounds/incisions: R thigh bruise. L groin and R abd site CDI   Diet: renal diet, calorie count; no free water  Activity Level: Ax2 lift, T/R, has been refusing repositioning.  Tests/Procedures: N/A  Anticipated  DC Date: pending improvement.  Significant Information: Can be agitated at times and noncompliant.

## 2024-04-11 NOTE — PROGRESS NOTES
CALORIE COUNT      Approximate Oral Intake for: 4/10  Calories: 713  Protein: 58 grams       Number of Meals Recorded: 1        Number of Snacks Recorded: 4       Dosing wt: 48 kg (3/29)    Estimated Needs:    Calories: 0547-0133 (25-30 kcal/kg)  Protein: 55-75 grams (1.2-1.5 g/kg)          Summary:   Diet: Renal/Dialysis + Fluid restriction- NO FREE WATER  Supplements: Ensure Clear (10 am) + Gelatein Plus (2 pm) + Ensure Max Protein (TID w/ meals) + Rigo for surgical wound healing (BID)        PO intake yesterday = 59% of min energy needs and 100% of min protein needs     Recommendations:   Will adjust oral nutrition supplements per pt preference   Goal to remain off TF = meet >60% of estimated nutrition needs via PO intake      Kcal count to continue        Keiko Briceno RD, LD

## 2024-04-11 NOTE — PROGRESS NOTES
VASCULAR SURGERY    Patient wide-awake and very conversant this morning.  Does not appear to be confused.      Tolerating clear liquids with no difficulty.  She had dialysis via tunneled catheter yesterday.      +3 left graft pulse.  Mild edema.    Right AKA dressing changed yesterday    IMPRESSION: #1.  Oliguric LIMA on dialysis.  Prognosis very guarded for return of renal function.      #2.  Status post right AKA      #3.  Hopefully we can continue to increase diet        Up in chair.  Physical therapy seeing patient.        Chadwick Lozano MD
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
## 2024-04-12 ENCOUNTER — DOCUMENTATION ONLY (OUTPATIENT)
Dept: OTHER | Facility: CLINIC | Age: 64
End: 2024-04-12
Payer: COMMERCIAL

## 2024-04-12 LAB
ANION GAP SERPL CALCULATED.3IONS-SCNC: 13 MMOL/L (ref 7–15)
BUN SERPL-MCNC: 76.4 MG/DL (ref 8–23)
CALCIUM SERPL-MCNC: 7.6 MG/DL (ref 8.8–10.2)
CHLORIDE SERPL-SCNC: 93 MMOL/L (ref 98–107)
CREAT SERPL-MCNC: 3.17 MG/DL (ref 0.51–0.95)
DEPRECATED HCO3 PLAS-SCNC: 21 MMOL/L (ref 22–29)
EGFRCR SERPLBLD CKD-EPI 2021: 16 ML/MIN/1.73M2
GLUCOSE BLDC GLUCOMTR-MCNC: 101 MG/DL (ref 70–99)
GLUCOSE BLDC GLUCOMTR-MCNC: 59 MG/DL (ref 70–99)
GLUCOSE BLDC GLUCOMTR-MCNC: 63 MG/DL (ref 70–99)
GLUCOSE BLDC GLUCOMTR-MCNC: 66 MG/DL (ref 70–99)
GLUCOSE BLDC GLUCOMTR-MCNC: 71 MG/DL (ref 70–99)
GLUCOSE BLDC GLUCOMTR-MCNC: 73 MG/DL (ref 70–99)
GLUCOSE BLDC GLUCOMTR-MCNC: 86 MG/DL (ref 70–99)
GLUCOSE BLDC GLUCOMTR-MCNC: 87 MG/DL (ref 70–99)
GLUCOSE BLDC GLUCOMTR-MCNC: 88 MG/DL (ref 70–99)
GLUCOSE BLDC GLUCOMTR-MCNC: 91 MG/DL (ref 70–99)
GLUCOSE BLDC GLUCOMTR-MCNC: 95 MG/DL (ref 70–99)
GLUCOSE SERPL-MCNC: 60 MG/DL (ref 70–99)
MAGNESIUM SERPL-MCNC: 1.7 MG/DL (ref 1.7–2.3)
PHOSPHATE SERPL-MCNC: 3.8 MG/DL (ref 2.5–4.5)
POTASSIUM SERPL-SCNC: 4.9 MMOL/L (ref 3.4–5.3)
SODIUM SERPL-SCNC: 127 MMOL/L (ref 135–145)

## 2024-04-12 PROCEDURE — 120N000001 HC R&B MED SURG/OB

## 2024-04-12 PROCEDURE — 94640 AIRWAY INHALATION TREATMENT: CPT | Mod: 76

## 2024-04-12 PROCEDURE — 258N000003 HC RX IP 258 OP 636: Performed by: INTERNAL MEDICINE

## 2024-04-12 PROCEDURE — 90937 HEMODIALYSIS REPEATED EVAL: CPT

## 2024-04-12 PROCEDURE — 87493 C DIFF AMPLIFIED PROBE: CPT | Performed by: STUDENT IN AN ORGANIZED HEALTH CARE EDUCATION/TRAINING PROGRAM

## 2024-04-12 PROCEDURE — 83735 ASSAY OF MAGNESIUM: CPT | Performed by: STUDENT IN AN ORGANIZED HEALTH CARE EDUCATION/TRAINING PROGRAM

## 2024-04-12 PROCEDURE — 250N000009 HC RX 250: Performed by: STUDENT IN AN ORGANIZED HEALTH CARE EDUCATION/TRAINING PROGRAM

## 2024-04-12 PROCEDURE — 84100 ASSAY OF PHOSPHORUS: CPT | Performed by: STUDENT IN AN ORGANIZED HEALTH CARE EDUCATION/TRAINING PROGRAM

## 2024-04-12 PROCEDURE — 258N000003 HC RX IP 258 OP 636: Performed by: STUDENT IN AN ORGANIZED HEALTH CARE EDUCATION/TRAINING PROGRAM

## 2024-04-12 PROCEDURE — 250N000013 HC RX MED GY IP 250 OP 250 PS 637: Performed by: STUDENT IN AN ORGANIZED HEALTH CARE EDUCATION/TRAINING PROGRAM

## 2024-04-12 PROCEDURE — 999N000157 HC STATISTIC RCP TIME EA 10 MIN

## 2024-04-12 PROCEDURE — 250N000011 HC RX IP 250 OP 636: Performed by: STUDENT IN AN ORGANIZED HEALTH CARE EDUCATION/TRAINING PROGRAM

## 2024-04-12 PROCEDURE — 99232 SBSQ HOSP IP/OBS MODERATE 35: CPT | Performed by: STUDENT IN AN ORGANIZED HEALTH CARE EDUCATION/TRAINING PROGRAM

## 2024-04-12 PROCEDURE — 82374 ASSAY BLOOD CARBON DIOXIDE: CPT | Performed by: STUDENT IN AN ORGANIZED HEALTH CARE EDUCATION/TRAINING PROGRAM

## 2024-04-12 PROCEDURE — 250N000011 HC RX IP 250 OP 636: Performed by: INTERNAL MEDICINE

## 2024-04-12 PROCEDURE — 90935 HEMODIALYSIS ONE EVALUATION: CPT | Performed by: INTERNAL MEDICINE

## 2024-04-12 PROCEDURE — 94640 AIRWAY INHALATION TREATMENT: CPT

## 2024-04-12 RX ORDER — ALBUMIN (HUMAN) 12.5 G/50ML
50 SOLUTION INTRAVENOUS
Status: DISCONTINUED | OUTPATIENT
Start: 2024-04-12 | End: 2024-04-12

## 2024-04-12 RX ADMIN — SODIUM CHLORIDE: 9 INJECTION, SOLUTION INTRAVENOUS at 18:41

## 2024-04-12 RX ADMIN — Medication: at 13:42

## 2024-04-12 RX ADMIN — DEXTROSE 15 G: 15 GEL ORAL at 17:35

## 2024-04-12 RX ADMIN — HYDROMORPHONE HYDROCHLORIDE 4 MG: 2 TABLET ORAL at 20:02

## 2024-04-12 RX ADMIN — SODIUM CHLORIDE TAB 1 GM 2 G: 1 TAB at 12:32

## 2024-04-12 RX ADMIN — HYDROMORPHONE HYDROCHLORIDE 4 MG: 2 TABLET ORAL at 08:22

## 2024-04-12 RX ADMIN — SODIUM CHLORIDE TAB 1 GM 2 G: 1 TAB at 08:22

## 2024-04-12 RX ADMIN — CARVEDILOL 6.25 MG: 6.25 TABLET, FILM COATED ORAL at 09:27

## 2024-04-12 RX ADMIN — Medication 1 CAPSULE: at 08:22

## 2024-04-12 RX ADMIN — ALBUTEROL SULFATE 2.5 MG: 2.5 SOLUTION RESPIRATORY (INHALATION) at 07:30

## 2024-04-12 RX ADMIN — AMLODIPINE BESYLATE 5 MG: 5 TABLET ORAL at 09:27

## 2024-04-12 RX ADMIN — FAMOTIDINE 20 MG: 10 INJECTION, SOLUTION INTRAVENOUS at 05:47

## 2024-04-12 RX ADMIN — CARVEDILOL 6.25 MG: 6.25 TABLET, FILM COATED ORAL at 17:28

## 2024-04-12 RX ADMIN — HEPARIN SODIUM 1600 UNITS: 1000 INJECTION INTRAVENOUS; SUBCUTANEOUS at 13:43

## 2024-04-12 RX ADMIN — ALBUTEROL SULFATE 2.5 MG: 2.5 SOLUTION RESPIRATORY (INHALATION) at 19:23

## 2024-04-12 RX ADMIN — HYDROMORPHONE HYDROCHLORIDE 4 MG: 2 TABLET ORAL at 12:32

## 2024-04-12 RX ADMIN — SODIUM CHLORIDE 300 ML: 9 INJECTION, SOLUTION INTRAVENOUS at 13:41

## 2024-04-12 RX ADMIN — SODIUM CHLORIDE 250 ML: 9 INJECTION, SOLUTION INTRAVENOUS at 13:41

## 2024-04-12 RX ADMIN — BETAMETHASONE DIPROPIONATE: 0.5 CREAM TOPICAL at 08:24

## 2024-04-12 RX ADMIN — FLUTICASONE FUROATE AND VILANTEROL TRIFENATATE 1 PUFF: 200; 25 POWDER RESPIRATORY (INHALATION) at 08:21

## 2024-04-12 RX ADMIN — NICOTINE 1 PATCH: 14 PATCH, EXTENDED RELEASE TRANSDERMAL at 08:24

## 2024-04-12 ASSESSMENT — ACTIVITIES OF DAILY LIVING (ADL)
ADLS_ACUITY_SCORE: 39
ADLS_ACUITY_SCORE: 39
ADLS_ACUITY_SCORE: 40
ADLS_ACUITY_SCORE: 40
ADLS_ACUITY_SCORE: 39
ADLS_ACUITY_SCORE: 40
ADLS_ACUITY_SCORE: 40
ADLS_ACUITY_SCORE: 39
ADLS_ACUITY_SCORE: 39
ADLS_ACUITY_SCORE: 40
ADLS_ACUITY_SCORE: 39
ADLS_ACUITY_SCORE: 40
ADLS_ACUITY_SCORE: 40
ADLS_ACUITY_SCORE: 39
ADLS_ACUITY_SCORE: 40
ADLS_ACUITY_SCORE: 39

## 2024-04-12 NOTE — PROGRESS NOTES
CALORIE COUNT      Approximate Oral Intake for: 4/11  Calories: 349  Protein: 41 grams      Number of Meals Recorded: 2        Number of Snacks Recorded: 2      Dosing wt: 48 kg (3/29)    Estimated Needs:    Calories: 0293-6715 (25-30 kcal/kg)  Protein: 55-75 grams (1.2-1.5 g/kg)      Summary:   Diet: Gluten Free Diet; Renal Diet (dialysis) + Fluid restriction 1200 ML FLUID    Supplements:   - Ensure Enlive; With Meals (breakfast)  - Gelatein Plus; Between Meals (2 pm)  - Ensure Max Protein (bariatric); With Meals (dinner)  + Rigo for surgical wound healing (BID)      PO intake yesterday = 29% of min energy needs and 75% of min protein needs    Recommendations:   Goal to remain off TF = meet average of >60% of estimated nutrition needs via PO intake       Extending kcal ct as intake is quite variable       Keiko Briceno, RD, LD

## 2024-04-12 NOTE — PROGRESS NOTES
Potassium   Date Value Ref Range Status   04/12/2024 4.9 3.4 - 5.3 mmol/L Final   12/29/2022 4.3 3.4 - 5.3 mmol/L Final   07/09/2021 5.2 3.4 - 5.3 mmol/L Final     Hemoglobin   Date Value Ref Range Status   04/11/2024 8.4 (L) 11.7 - 15.7 g/dL Final   07/09/2021 8.8 (L) 11.7 - 15.7 g/dL Final     Creatinine   Date Value Ref Range Status   04/12/2024 3.17 (H) 0.51 - 0.95 mg/dL Final   07/09/2021 0.77 0.52 - 1.04 mg/dL Final     Urea Nitrogen   Date Value Ref Range Status   04/12/2024 76.4 (H) 8.0 - 23.0 mg/dL Final   12/29/2022 17 7 - 30 mg/dL Final   07/09/2021 13 7 - 30 mg/dL Final     Sodium   Date Value Ref Range Status   04/12/2024 127 (L) 135 - 145 mmol/L Final     Comment:     Reference intervals for this test were updated on 09/26/2023 to more accurately reflect our healthy population. There may be differences in the flagging of prior results with similar values performed with this method. Interpretation of those prior results can be made in the context of the updated reference intervals.    07/09/2021 132 (L) 133 - 144 mmol/L Final     INR   Date Value Ref Range Status   04/11/2024 1.31 (H) 0.85 - 1.15 Final   09/24/2020 1.01 0.86 - 1.14 Final      Latest Reference Range & Units 04/03/24 04:23 04/03/24 11:41   Hep B Surface Agn Nonreactive  Nonreactive    Hepatitis B Surface Antibody Instrument Value <8.5 m[IU]/mL  <3.50   Hepatitis B Surface Antibody   Nonreactive       DIALYSIS PROCEDURE NOTE  Hepatitis status of previous patient on machine log was checked and verified ok to use with this patients hepatitis status.    Patient dialyzed for 3 hrs. on a K2 bath with a net fluid removal of  2.8L.  A BFR of 150 ml/min was obtained via a right cvc.      The treatment plan was discussed with Dr. Alegria during the treatment.    Total heparin received during the treatment: 0 units.     Line flushed, clamped and capped with heparin 1:1000 1.6 mL (1600 units) per lumen    Meds  given: Epogen 6000     Complications:  patients cvc was very positional and alarming the entire treatment- Lines were reversed 2 times and flushed with normal saline- still was only able to get a 150-200 BFR. With an hour and 15 mins left she then clotted her venous drip chamber , unable to rinse patient back( ) Set up new system and still was only able to run at 150 with constant alarms. MD notified of issues and will try again on Monday and if there are still issues she may need a new CVC placed.   Patient also had a moderate liquid stool.     Person educated: pre tx. Knowledge base minimal. Barriers to learning: none. Educated on procedure  via verbal mode. The patient verbalized understanding. Pt prefers verbal education style.     ICEBOAT? Timeout performed pre-treatment  I: Patient was identified using 2 identifiers  C:  Consent Signed Yes  E: Equipment preventative maintenance is current and dialysis delivery system OK to use  O: Dialysis orders present and complete prior to treatment  A: Vascular access verified and assessed prior to treatment  T: Treatment was performed at a clinically appropriate time  ?: Patient was allowed to ask questions and address concerns prior to treatment  See Adult Hemodialysis flowsheet in [facility] for further details and post assessment.  Machine water alarm in place and functioning. Transducer pods intact and checked every 15min.   Pt returned via bed.  Chlorine/Chloramine water system checked every 4 hours.  Outpatient Dialysis at D    Patient repositioned every 2 hours during the treatment.  Post treatment report given to Sara Coronado RN regarding 2.8 L of fluid removed, last BP of 148/67, and patient pain rating of 6/10.

## 2024-04-12 NOTE — PROGRESS NOTES
[facility]    Medicine Progress Note - Hospitalist Service    Date of Admission:  3/29/2024  Date of Service: 04/12/2024    Assessment & Plan     Shirley Hendricks is a 63 year old female admitted on 3/29/2024 due to occlusion of right LE bypass graft.      Past medical history significant for PAD/PVD, Tobacco use D/O, CAD (history of MI x3), HTN, HLP, DM2, Neuropathy, COPD, GERD, Anemia, Spongiotic dermatitis, MDD with anxiety, Chronic anticoagulation therapy with history of DVT/PE, OA, Charcot-Breonna-Tooth foot deformity, Marginal zone B-cell lymphoma, History of SBO, History of Takotsubo CM, History of SVT.    Discussed with Dr. Mazariegos and reviewed ED notes and Vascular Surgery consult note.  Patient presented to the ED due to right leg pain that had begun ~ 1 hour prior to presentation.  She reported pain seems to worsen with walking and when she attempted to check a pulse in her leg it was absent.  She also described that the foot is colder than the left.      While in the lobby attempts to find right pedal pulse with doppler were unsuccessful.  Dr. Lozano of Vascular Surgery was contacted by the patient as well as Dr. Mazariegos.      Work-up completed while patient was in the lobby included right arterial LE US revealed the right common femoral artery to mid anterior tibial artery bypass graft occlusion.      After assessing patient labs were collected and included a CMP that revealed a sodium of 132, chloride of 97, CO2 of 21 and glucose of 103 otherwise within normal limits.  CBC with differential revealed an RDW of 15.7 otherwise unremarkable.    Right common femoral artery to mid anterior tibial artery bypass graft acute occlusion  PAD/PVD  Acute Blood Loss Anemia  *Occurred despite low-dose Xarelto and Plavix therapy.    - Vascular surgery consult requested -> urgent guillotine right above knee amputation today followed by close monitoring in the ICU.    - Tentative plan to  return to OR today for formalization of AKA with Dr. Diaz   - IR was consulted and following  --Per Vascular surgery consult Dr. Salomon was contacted and s/p right LE angiogram and lytic therapy 03/30 -> 03/31 took the patient to the angio suite to remove the sheath in the left groin with closure device.   --IR now signed off  - Hold PTA Xarelto 15 mg nightly.    - Defer resumption of PTA Plavix 75 mg/d to Vascular Surgery and/or IR -> plavix to restart once platelets improve, continue to monitor bypass in LLE   - Statin and antihypertensive management as below.    - Pain management as needed  - Follow Hgb daily  - transfused 1 U PRBCs 03/30 / 03/31 and 04/02 and 04/06 and 04/09 and closely monitor.  - CBC Q12H, transfuse as needed Hgb < 7.0  - PT / OT eval -> TCU   - SW consulted    Rhabdomyolysis   ARF  Acute Hyponatremia  Assessment: CPK has been rising. Her serum creatinine and body urea nitrogen also rising. Renal US -> No obstruction demonstrated.   Plan:  - Follow CK / BMP daily -> Cr stable  - Nephrology following -> now on HD, Decreased BP following dilaudid for pain and 25% albumin ordered with improvement in BPs first day of HD  - Hyponatremia mgtmt per renal service -> improving  - Avoid NSAIDs / nephrotoxins    Colon distention  Abdominal Pain  Assessment:CT abdomen 04/03 -> Medium-sized right retroperitoneal and extraperitoneal hematoma. Evaluation for extravasation is unable to be performed without contrast. This is likely similar to slightly smaller than the CT lumbar spine although this is incompletely visualized at   that time  Extensive pulmonary opacities. Differential: Multifocal infection, ARDS, and/or pulmonary  Plan:  --NG placed -> remove in coming days as GI issues resolve -> now removed  --Can stop TFs -> Dialysis diet -> ADAT  --Hold Anticoagulation for now given anemia  --GI following ->  consider flex sig/colonoscopy as outpatient.  --Repeat flat AXR 4/5: Nonobstructive bowel gas  pattern. Gaseous distention of the colon. Enteric feeding tube tip in the fourth portion of the duodenum. Iliac stents, cholecystectomy clips, and endoscopic clips in the right hemiabdomen.   --Continue to monitor  --Pain control as needed  --C Diff pending for loose stools    MDD with anxiety  Acute Delirium   *Noted on chart review.  No interventions.    Delirium from acute illness  Plan:  Delirium has mostly resolved  Continue to maintain delirium precautions.   IM Zyprexa as needed    Contrast dye allergy  - Ordered solumedrol and benadryl per contrast dye allergy protocol.      Mild hyponatremia  - BMP daily -> improving    Tobacco Use D/O   Patient currently smokes max 5 cigarettes per day.     - Counseled regarding smoking cessation that should also continue with PCP.    - Nicoderm patch ordered.    Recent celiac disease  *Patient reported recent GI work-up revealed she has celiac disease.    - Once diet can be advanced would request no gluten/celiac diet.      CAD (history of MI x3)  HTN  HLP  *Last coronary angiogram completed 10/2023.    - Resumed on PTA Coreg 6.25 mg BID, hold lisinopril 10 mg/d due to LIMA, continue Norvasc 2.5 mg/d.  Hold parameters in place.    - hold PTA rosuvastatin 40 mg/d due to acute rhabdo  - PRN IV hydralazine 10 mg every 4 hours for SBP GREATER THAN 180.      History of Takotsubo CM  *Recovered EF (55-60%) from ECHO 11/2023.    - Resumed on PTA Coreg 6.25 mg BID (hold for SBP < 110), hold lisinopril 10 mg/d and  hold Jardiance 10 mg/d For now given ARF    Type 2 DM  Diabetic neuropathy  *Noted diagnosis on chart review.  However, A1c of 5.2%.    - Hold PTA Jardiance 10 mg/d and gabapentin 100 mg TID due to AMS.  - No insulin as borderline hypoglycemic    COPD   - Resumed on PTA inhalers.    - Encourage use of Flutter valve/IS.      GERD  - Resumed on PTA omeprazole 20 mg/d.      Anemia  Recent GI bleed (12/2023 and admitted at [facility])  - Hold PTA Iron supplements and resume  at discharge.    - CBC daily      Spongiotic dermatitis  *Recently seen at outpatient Derm.    - Resumed on PTA betamethasone cream BID.      Chronic anticoagulation therapy with history of DVT/PE  - Hold PTA Xarelto.      OA  - Pain management as above.      Charcot-Breonna-Tooth foot deformity  *Noted on chart review.  No interventions.     Stage VALENTIN marginal zone B-cell lymphoma  *Follows with [facility] (Dr. Barrow).    - Continue outpatient surveillance (CT scan in 8/2024).      History of SBO  *Noted on chart review.  No interventions.    History of SVT  - Resumed on PTA Coreg as above.            Diet: Snacks/Supplements Adult: Gelatein Plus; Between Meals  Snacks/Supplements Adult: Ensure Max Protein (bariatric); With Meals  Snacks/Supplements Adult: Ensure Enlive; With Meals  Combination Diet Gluten Free Diet; Renal Diet (dialysis)  Fluid restriction 1200 ML FLUID  Calorie Counts    DVT Prophylaxis: Pneumatic Compression Devices  Alvarez Catheter: Not present  Lines: PRESENT      PICC 03/31/24 Double Lumen Right Brachial vein medial access-Site Assessment: WDL  CVC Double Lumen Right Internal jugular Tunneled-Site Assessment: WDL      Cardiac Monitoring: None  Code Status: Full Code      Clinically Significant Risk Factors         # Hyponatremia: Lowest Na = 128 mmol/L in last 2 days, will monitor as appropriate      # Hypoalbuminemia: Lowest albumin = 1.8 g/dL at 4/9/2024  6:36 AM, will monitor as appropriate  # Coagulation Defect: INR = 1.31 (Ref range: 0.85 - 1.15) and/or PTT = N/A, will monitor for bleeding    # Hypertension: Noted on problem list         # Severe Malnutrition: based on nutrition assessment    # Financial/Environmental Concerns:           Disposition Plan      Expected Discharge Date: 04/15/2024        Discharge Comments: TF, R amp, PT/OT  Neph=dilaysis  Wound Vac          Marcin White MD  Hospitalist Service  [facility]  Securely message with [facility]  info)  Text page via [facility] Paging/Directory   ______________________________________________________________________    Interval History     No acute events overnight  Resting in bed.  Pain controlled  Loose stools today. Non bloody.   No fevers  No new CP/SOB   No nausea / vomiting  No new complaints otherwise  Calm and pleasant today    Physical Exam   Vital Signs: Temp: 98.6  F (37  C) Temp src: Oral BP: 98/64 Pulse: 72   Resp: 21 SpO2: 96 % O2 Device: Nasal cannula Oxygen Delivery: 2 LPM  Weight: 125 lbs .01 oz      Constitutional: Awake, alert, no apparent distress.    Pulmonary: CTABL  Cardiovascular: Regular rate and rhythm, normal S1 and S2, no S3 or S4, and no murmur noted. 2+ pitting edema bilaterally.  GI: Normal bowel sounds, soft, non-distended, non-tender.    Neuro: Fahad but has sensory loss and is not able to wiggle toes but is able to plantarflex and dorsiflex.   Psych:  Alert and oriented x3. Fahad. Normal affect.  Extremities: Right AKA wound with dressing.  ----------------------------------------------------------------------------------------    Medical Decision Making       35 MINUTES SPENT BY ME on the date of service doing chart review, history, exam, documentation & further activities per the note.      Data   ------------------------- PAST 24 HR DATA REVIEWED -----------------------------------------------        Imaging results reviewed over the past 24 hrs:   No results found for this or any previous visit (from the past 24 hour(s)).    ------------------------- ENCOUNTER LABS ----------------------------------------------------------------  Recent Labs   Lab 04/12/24  1158 04/12/24  0749 04/12/24  0704 04/11/24  0800 04/11/24  0643 04/10/24  2156 04/10/24  1835 04/10/24  1324 04/10/24  0705 04/09/24  2121 04/09/24  1817 04/09/24  1212 04/09/24  0636   WBC  --   --   --   --  5.0  --   --   --   --   --  3.9*  --  3.9*   HGB  --   --   --   --  8.4*  --   --   --   --   --  9.0*  --  6.7*    MCV  --   --   --   --  86  --   --   --   --   --  86  --  84   PLT  --   --   --   --  151  --   --   --   --   --  105*  --  48*   INR  --   --   --   --  1.31*  --   --   --   --   --   --   --   --    NA  --   --   --   --  128*  --  130*  --  124*   < > 124*  --  127*   POTASSIUM  --   --   --   --  4.6  4.7  --  4.6  --  5.9*   < > 5.5*  --  4.9   CHLORIDE  --   --   --   --  93*  --  94*  --  91*   < > 91*  --  94*   CO2  --   --   --   --  26  --  25  --  20*   < > 23  --  25   BUN  --   --   --   --  45.9*  --  34.8*  --  66.8*   < > 54.0*  --  48.3*   CR  --   --   --   --  2.56*  --  2.09*  --  3.20*   < > 2.84*  --  2.66*   ANIONGAP  --   --   --   --  9  --  11  --  13   < > 10  --  8   SONIA  --   --   --   --  7.4*  --  7.5*  --  7.7*   < > 7.4*  --  7.1*   GLC 86 88 91   < > 74   < > 79   < > 90   < > 112*   < > 78   ALBUMIN  --   --   --   --   --   --   --   --   --   --   --   --  1.8*   PROTTOTAL  --   --   --   --   --   --   --   --   --   --   --   --  3.9*   BILITOTAL  --   --   --   --   --   --   --   --   --   --   --   --  0.9   ALKPHOS  --   --   --   --   --   --   --   --   --   --   --   --  155*   ALT  --   --   --   --   --   --   --   --   --   --   --   --  53*   AST  --   --   --   --   --   --   --   --   --   --   --   --  68*    < > = values in this interval not displayed.       Most Recent 3 CBC's:  Recent Labs   Lab Test 04/11/24  0643 04/09/24  1817 04/09/24  0636   WBC 5.0 3.9* 3.9*   HGB 8.4* 9.0* 6.7*   MCV 86 86 84    105* 48*     Most Recent 3 BMP's:  Recent Labs   Lab Test 04/12/24  1158 04/12/24  0749 04/12/24  0704 04/11/24  0800 04/11/24  0643 04/10/24  2156 04/10/24  1835 04/10/24  1324 04/10/24  0705   NA  --   --   --   --  128*  --  130*  --  124*   POTASSIUM  --   --   --   --  4.6  4.7  --  4.6  --  5.9*   CHLORIDE  --   --   --   --  93*  --  94*  --  91*   CO2  --   --   --   --  26  --  25  --  20*   BUN  --   --   --   --  45.9*  --  34.8*  --   66.8*   CR  --   --   --   --  2.56*  --  2.09*  --  3.20*   ANIONGAP  --   --   --   --  9  --  11  --  13   SONIA  --   --   --   --  7.4*  --  7.5*  --  7.7*   GLC 86 88 91   < > 74   < > 79   < > 90    < > = values in this interval not displayed.     Most Recent 2 LFT's:  Recent Labs   Lab Test 04/09/24  0636 04/04/24  0541   AST 68* 301*   ALT 53* 326*   ALKPHOS 155* 192*   BILITOTAL 0.9 0.6     Most Recent 3 INR's:  Recent Labs   Lab Test 04/11/24  0643 10/07/23  0516 10/06/23  0551   INR 1.31* 1.38* 1.08     Most Recent 3 Troponin's:  Recent Labs   Lab Test 06/10/20  1243 02/16/20  1824 09/18/19  0739   TROPI <0.015 <0.015 <0.015     Most Recent 3 BNP's:No lab results found.  Most Recent D-dimer:  Recent Labs   Lab Test 08/13/21  0934   DD 10.38*     Most Recent Cholesterol Panel:  Recent Labs   Lab Test 10/03/23  0941   CHOL 137   LDL 69   HDL 54   TRIG 68     Most Recent 6 Bacteria Isolates From Any Culture (See EPIC Reports for Culture Details):No lab results found.  Most Recent TSH and T4:  Recent Labs   Lab Test 04/10/24  0014 02/04/19  0934 03/28/17  0922   TSH 2.53   < > 1.37   T4  --   --  1.19    < > = values in this interval not displayed.     Most Recent Urinalysis:  Recent Labs   Lab Test 02/04/19  0935   COLOR Yellow   APPEARANCE Clear   URINEGLC Negative   URINEBILI Negative   URINEKETONE Negative   SG 1.010   UBLD Negative   URINEPH 6.0   PROTEIN Negative   UROBILINOGEN 0.2   NITRITE Negative   LEUKEST Negative   RBCU O - 2   WBCU 0 - 5     Most Recent ESR & CRP:  Recent Labs   Lab Test 11/13/23  1705   SED 39*

## 2024-04-12 NOTE — PLAN OF CARE
Goal Outcome Evaluation:      Plan of Care Reviewed With: patient      Date & Time:1900-0730     Surgery/POD#: POD#10 from R AKA; POD#3 R AKA closure   Behavior & Aggression: green  Fall Risk: Yes  Orientation: A&O x 4  ABNL VS/O2: VSS on 2.5 L NC ex elevated BP   ABNL Labs: see chart  Pain Management: oral dilaudid x1 for pain in leg.  Bowel/Bladder: on hemodialysis, incontinent of bowel. Loose BM x1 this shift.  Drains: RUE double lumen PICC infusing at 15ml/hr(TKO). R CVC for dialysis access.  Wounds/incisions: R thigh bruise. R and L groin sites.CDI   Diet: renal diet, calorie count; FR 1200  Activity Level: Ax2 lift, T/R, refuses repositioning.  Tests/Procedures: N/A  Anticipated  DC Date: pending  Significant Information: Vascular/Neph following. Can be agitated at times and noncompliant.

## 2024-04-12 NOTE — PLAN OF CARE
Date & Time: 4/12/24 [id number]  Surgery/POD#: POD#12 from R AKA; POD#3 R AKA closure   Behavior & Aggression: green  Fall Risk: Yes  Orientation: A&Ox4  ABNL VS/O2: VSS on 2.5 L NC  ABNL Labs: BG 88, 86  Pain Management: oral dilaudid x2 for incision pain.   Bowel/Bladder: on hemodialysis, incontinent of bowel.   Drains: RUE double lumen PICC infusing at 15ml/hr. Internal jugular dialysis access.  Wounds/incisions: R thigh bruise. R and L groin sites.CDI   Diet: renal diet, calorie count; no free water FR 1200  Activity Level: Ax2 lift, T/R, refuses repositioning.  Tests/Procedures: Dialysis  Anticipated  DC Date: pending  Significant Information: Vascular/Neph following. Can be agitated at times and noncompliant. C-diff rule out precautions initiated. Stool sample needed.

## 2024-04-12 NOTE — PROGRESS NOTES
VASCULAR SURGERY    Overall doing well but still very little very sore AKA.    Tolerating regular diet.  Loose stools today--.  Ruling out C. difficile in decreasing    Right AKA dressing change.  Wd looks good.      Anterior lateral skin slough   Redressed with Xeroform-fluffs-Kerlix    Reports urinating.    Chadwick Lozano MD

## 2024-04-12 NOTE — PROGRESS NOTES
Renal Medicine Progress Note            Assessment/Plan:     # Patient with a creatinine of 0.45-0.9 mg/dl at baseline.      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               -dialysis dependent     # Ischemic RLE due to occluded bypass graft   -AKA due to unsalvagable limb 04/01/24     # FEN: 2+ pitting edema. Hypervolemia hypernatremia. Should improve with UF and dialysis. Corrected calcium is normal at ~ 9.5    # Anemia:    -EPO with HD    Plan:  # No need for sodium tablet. Sodium level will improve with UF.   # Okay with 1200 ml of fluid restriction  # Next HD treatment is on Monday. May need new line on Monday.  # Continue to monitor kidney function for improvement        Interval History:     She is getting HD tx.  UF set for 3.5 liters net.   CVC with low flow-positional and line reversed.          Medications and Allergies:     Current Facility-Administered Medications   Medication Dose Route Frequency Provider Last Rate Last Admin    - MEDICATION INSTRUCTIONS for Dialysis Patients -   Does not apply See Admin Instructions Marcin White MD        albuterol (PROVENTIL) neb solution 2.5 mg  2.5 mg Nebulization Q6H Marcin White MD   2.5 mg at 04/12/24 0730    amLODIPine (NORVASC) tablet 5 mg  5 mg Oral Daily Marcin White MD   5 mg at 04/12/24 0927    betamethasone dipropionate (DIPROSONE) 0.05 % cream   Topical BID Marcin White MD   Given at 04/12/24 0824    carvedilol (COREG) tablet 6.25 mg  6.25 mg Oral BID w/meals Marcin White MD   6.25 mg at 04/12/24 0927    Contraindications to both pharmacological and mechanical prophylaxis (must document contraindications for both in this order)   Does not apply See Admin Instructions Marcin White MD        epoetin mireya-epbx (RETACRIT) injection 6,000 Units  6,000 Units Intravenous Once Torrey Alegria MD        famotidine (PEPCID) injection 20 mg  20 mg Intravenous Q48H Marcin White MD   20 mg at 04/12/24 0547     "fluticasone-vilanterol (BREO ELLIPTA) 200-25 MCG/ACT inhaler 1 puff  1 puff Inhalation Daily Marcin White MD   1 puff at 04/12/24 0821    insulin aspart (NovoLOG) injection (RAPID ACTING)  1-7 Units Subcutaneous TID AC Marcin White MD        insulin aspart (NovoLOG) injection (RAPID ACTING)  1-5 Units Subcutaneous At Bedtime Marcin White MD        Rigo PACK 1 packet  1 packet Oral BID 09 12 Gala Morales DO   1 packet at 04/11/24 1243    multivitamin RENAL (TRIPHROCAPS) capsule 1 capsule  1 capsule Oral Daily Gala Morales DO   1 capsule at 04/12/24 0822    nicotine (NICODERM CQ) 14 MG/24HR 24 hr patch 1 patch  1 patch Transdermal Daily Marcin White MD   1 patch at 04/12/24 0824    sodium chloride (PF) 0.9% PF flush 10-40 mL  10-40 mL Intracatheter Q8H Sofia Cristobal MD   30 mL at 04/12/24 0548    sodium chloride (PF) 0.9% PF flush 3 mL  3 mL Intracatheter Q8H Kaylee Liu NP   3 mL at 04/12/24 0824    sodium chloride tablet 2 g  2 g Oral TID w/meals Gala Morales DO   2 g at 04/12/24 1232        Allergies   Allergen Reactions    Pantoprazole      Protonix caused diffuse edema    Chantix [Varenicline]      Terrible dreams    Contrast Dye Swelling     Patient reports facial and throat swelling with prior CT contrast.    Penicillins Itching and Rash            Physical Exam:   Vitals were reviewed   , Blood pressure 98/64, pulse 72, temperature 98.6  F (37  C), temperature source Oral, resp. rate 21, height 1.549 m (5' 1\"), weight 56.7 kg (125 lb), SpO2 96%, not currently breastfeeding.    Wt Readings from Last 3 Encounters:   04/12/24 56.7 kg (125 lb)   01/08/24 49.8 kg (109 lb 12.8 oz)   12/12/23 50.8 kg (112 lb)       Intake/Output Summary (Last 24 hours) at 4/12/2024 1359  Last data filed at 4/12/2024 0900  Gross per 24 hour   Intake 240 ml   Output --   Net 240 ml     GENERAL APPEARANCE: Frail.   HEENT:  Eyes/ears/nose/neck grossly normal  RESP: lungs cta b c good efforts, no crackles, " rhonchi or wheezes  CV: RRR  ABDOMEN: Soft, NT  EXTREMITIES/SKIN:R AKA.  2+ pitting edema.   NEURO: Awake, alert and conversing normally  R TDC CDI         Data:     CBC RESULTS:     Recent Labs   Lab 04/11/24  0643 04/09/24  1817 04/09/24  0636 04/08/24  0631 04/07/24  2058 04/07/24  0628   WBC 5.0 3.9* 3.9* 6.5 6.7 6.9   RBC 2.87* 3.03* 2.37* 3.01* 2.87* 3.03*   HGB 8.4* 9.0* 6.7* 8.7* 8.0* 8.8*   HCT 24.6* 26.0* 20.0* 25.1* 23.9* 25.4*    105* 48* 55* 38* 38*       Basic Metabolic Panel:  Recent Labs   Lab 04/12/24  1158 04/12/24  0749 04/12/24  0704 04/12/24  0603 04/12/24  0201 04/11/24  2107 04/11/24  0800 04/11/24  0643 04/10/24  2156 04/10/24  1835 04/10/24  1324 04/10/24  0705 04/10/24  0151 04/10/24  0014 04/09/24  2121 04/09/24  1817 04/09/24  1212 04/09/24  0636   NA  --   --   --   --   --   --   --  128*  --  130*  --  124*  --  122*  --  124*  --  127*   POTASSIUM  --   --   --   --   --   --   --  4.6  4.7  --  4.6  --  5.9*  --  5.7*  --  5.5*  --  4.9   CHLORIDE  --   --   --   --   --   --   --  93*  --  94*  --  91*  --  89*  --  91*  --  94*   CO2  --   --   --   --   --   --   --  26  --  25  --  20*  --  22  --  23  --  25   BUN  --   --   --   --   --   --   --  45.9*  --  34.8*  --  66.8*  --  60.5*  --  54.0*  --  48.3*   CR  --   --   --   --   --   --   --  2.56*  --  2.09*  --  3.20*  --  3.07*  --  2.84*  --  2.66*   GLC 86 88 91 66* 73 87   < > 74   < > 79   < > 90   < > 107*   < > 112*   < > 78   SONIA  --   --   --   --   --   --   --  7.4*  --  7.5*  --  7.7*  --  7.6*  --  7.4*  --  7.1*    < > = values in this interval not displayed.       INR  Recent Labs   Lab 04/11/24  0643   INR 1.31*      Attestation:   I have reviewed today's relevant vital signs, notes, medications, labs and imaging.    Torrey Alegria MD  [facility] - Nephrology  Office phone :[phone]  Pager: [phone]

## 2024-04-12 NOTE — PLAN OF CARE
Goal Outcome Evaluation:    Date & Time: 4/12/24 3448-2852  Surgery/POD#: POD#12 from R AKA; POD#3 R AKA closure   Behavior & Aggression: green  Fall Risk: Yes  Orientation: A&Ox4  ABNL VS/O2: VSS on 2 L NC  ABNL Labs: BG 59, 63, 71. 15 gr Glucose gel given to boost BS, recheck low. Pt drank apple juice, recheck at 71.   Pain Management: oral dilaudid x2 for incision pain given on previous shift  Bowel/Bladder: hemodialysis today, incontinent of bowel.   Drains: RUE double lumen PICC infusing NS at 15ml/hr. Internal jugular dialysis access, WDL.  Wounds/incisions: R thigh bruise. R and L groin sites, CDI   Diet: gluten free renal diet, calorie count; no free water. FR 1200mL  Activity Level: Ax2 lift, T/R Q2hr, refuses repositioning at times  Tests/Procedures: Dialysis completed today  Anticipated  DC Date: pending  Significant Information: Vascular/Neph following. Can be agitated at times and noncompliant. C-diff rule out precautions initiated. Stool sample needed.

## 2024-04-12 NOTE — CONSULTS
"[facility] - Consult Note    SH  Gen Surg    Referral Source/Reason for Visit: Follow up at patient/family request    Summary and Recommendations -    Shirley is currently primarily concerned with getting her pain under control. Once that happens, she appreciates her daughter's help in making decisions about where to go and what help to enlist.  Shirley is taking \"one day at a time\" and appreciates  connections for prayer and support.    Plan: Spiritual Health will continue to follow this patient while on the unit.    Liset Orozco M.Div.    Chaplain Resident    SHS available 24/7 for emergent requests/referrals, either by paging the on-call  or by entering an ASAP/STAT consult in [employer], which will also page the on-call .    Assessment    Saw pt Shirley Eladio per consult.      Patient/Family Understanding of Illness and Goals of Care -   Shirley shared about the \"unexpected\" nature of her recent amputation and \"has no idea\" what goals will be best for her. She is choosing to take a day at a time to figure things out but is weighing the possibility of TCU right now.      Distress and Loss -   Shirley talked briefly both about family dynamics that can be challenging as well as the pain she is currently in that she would like to see under better control. She expressed the need for better sleep and wanted only a short visit so she could rest.      Strengths, Coping, and Resources -   Shirley's daughter, Miriam, has been very supportive and helpful during this time of medical need and she expressed gratitude for that. We also talked about some of her personal characteristics like \"telling it like it is,\" fortitude, and being faithful, that serve her well.      Meaning, Beliefs, and Spirituality - We didn't specifically talk about her Judaism beliefs but she did request a prayer for relief from pain and guidance during her healing process, which I was able to provide.  "

## 2024-04-13 LAB
ABO/RH(D): NORMAL
ANION GAP SERPL CALCULATED.3IONS-SCNC: 10 MMOL/L (ref 7–15)
ANTIBODY SCREEN: NEGATIVE
BASOPHILS # BLD AUTO: ABNORMAL 10*3/UL
BASOPHILS # BLD MANUAL: 0.1 10E3/UL (ref 0–0.2)
BASOPHILS NFR BLD AUTO: ABNORMAL %
BASOPHILS NFR BLD MANUAL: 1 %
BLD PROD TYP BPU: NORMAL
BLOOD COMPONENT TYPE: NORMAL
BUN SERPL-MCNC: 52.4 MG/DL (ref 8–23)
C DIFF TOX B STL QL: NEGATIVE
CALCIUM SERPL-MCNC: 7.2 MG/DL (ref 8.8–10.2)
CHLORIDE SERPL-SCNC: 95 MMOL/L (ref 98–107)
CODING SYSTEM: NORMAL
CREAT SERPL-MCNC: 2.62 MG/DL (ref 0.51–0.95)
CROSSMATCH: NORMAL
DEPRECATED HCO3 PLAS-SCNC: 25 MMOL/L (ref 22–29)
EGFRCR SERPLBLD CKD-EPI 2021: 20 ML/MIN/1.73M2
EOSINOPHIL # BLD AUTO: ABNORMAL 10*3/UL
EOSINOPHIL # BLD MANUAL: 0 10E3/UL (ref 0–0.7)
EOSINOPHIL NFR BLD AUTO: ABNORMAL %
EOSINOPHIL NFR BLD MANUAL: 0 %
ERYTHROCYTE [DISTWIDTH] IN BLOOD BY AUTOMATED COUNT: 16 % (ref 10–15)
ERYTHROCYTE [DISTWIDTH] IN BLOOD BY AUTOMATED COUNT: 16 % (ref 10–15)
GLUCOSE BLDC GLUCOMTR-MCNC: 79 MG/DL (ref 70–99)
GLUCOSE BLDC GLUCOMTR-MCNC: 81 MG/DL (ref 70–99)
GLUCOSE SERPL-MCNC: 69 MG/DL (ref 70–99)
HCT VFR BLD AUTO: 20.2 % (ref 35–47)
HCT VFR BLD AUTO: 20.2 % (ref 35–47)
HGB BLD-MCNC: 6.7 G/DL (ref 11.7–15.7)
HGB BLD-MCNC: 6.7 G/DL (ref 11.7–15.7)
IMM GRANULOCYTES # BLD: ABNORMAL 10*3/UL
IMM GRANULOCYTES NFR BLD: ABNORMAL %
ISSUE DATE AND TIME: NORMAL
LYMPHOCYTES # BLD AUTO: ABNORMAL 10*3/UL
LYMPHOCYTES # BLD MANUAL: 0.3 10E3/UL (ref 0.8–5.3)
LYMPHOCYTES NFR BLD AUTO: ABNORMAL %
LYMPHOCYTES NFR BLD MANUAL: 5 %
MAGNESIUM SERPL-MCNC: 1.6 MG/DL (ref 1.7–2.3)
MCH RBC QN AUTO: 29.3 PG (ref 26.5–33)
MCH RBC QN AUTO: 29.3 PG (ref 26.5–33)
MCHC RBC AUTO-ENTMCNC: 33.2 G/DL (ref 31.5–36.5)
MCHC RBC AUTO-ENTMCNC: 33.2 G/DL (ref 31.5–36.5)
MCV RBC AUTO: 88 FL (ref 78–100)
MCV RBC AUTO: 88 FL (ref 78–100)
METAMYELOCYTES # BLD MANUAL: 0.3 10E3/UL
METAMYELOCYTES NFR BLD MANUAL: 5 %
MONOCYTES # BLD AUTO: ABNORMAL 10*3/UL
MONOCYTES # BLD MANUAL: 0.4 10E3/UL (ref 0–1.3)
MONOCYTES NFR BLD AUTO: ABNORMAL %
MONOCYTES NFR BLD MANUAL: 7 %
MYELOCYTES # BLD MANUAL: 0.1 10E3/UL
MYELOCYTES NFR BLD MANUAL: 1 %
NEUTROPHILS # BLD AUTO: ABNORMAL 10*3/UL
NEUTROPHILS # BLD MANUAL: 4.9 10E3/UL (ref 1.6–8.3)
NEUTROPHILS NFR BLD AUTO: ABNORMAL %
NEUTROPHILS NFR BLD MANUAL: 81 %
NRBC # BLD AUTO: 0.1 10E3/UL
NRBC # BLD AUTO: 0.1 10E3/UL
NRBC BLD AUTO-RTO: 1 /100
NRBC BLD MANUAL-RTO: 1 %
PHOSPHATE SERPL-MCNC: 3.3 MG/DL (ref 2.5–4.5)
PLAT MORPH BLD: ABNORMAL
PLATELET # BLD AUTO: 157 10E3/UL (ref 150–450)
PLATELET # BLD AUTO: 157 10E3/UL (ref 150–450)
POTASSIUM SERPL-SCNC: 4.2 MMOL/L (ref 3.4–5.3)
RBC # BLD AUTO: 2.29 10E6/UL (ref 3.8–5.2)
RBC # BLD AUTO: 2.29 10E6/UL (ref 3.8–5.2)
RBC MORPH BLD: ABNORMAL
SODIUM SERPL-SCNC: 130 MMOL/L (ref 135–145)
SPECIMEN EXPIRATION DATE: NORMAL
UNIT ABO/RH: NORMAL
UNIT NUMBER: NORMAL
UNIT STATUS: NORMAL
UNIT TYPE ISBT: 6200
WBC # BLD AUTO: 6 10E3/UL (ref 4–11)
WBC # BLD AUTO: 6 10E3/UL (ref 4–11)

## 2024-04-13 PROCEDURE — 999N000157 HC STATISTIC RCP TIME EA 10 MIN

## 2024-04-13 PROCEDURE — 94640 AIRWAY INHALATION TREATMENT: CPT

## 2024-04-13 PROCEDURE — 80048 BASIC METABOLIC PNL TOTAL CA: CPT | Performed by: STUDENT IN AN ORGANIZED HEALTH CARE EDUCATION/TRAINING PROGRAM

## 2024-04-13 PROCEDURE — P9016 RBC LEUKOCYTES REDUCED: HCPCS | Performed by: STUDENT IN AN ORGANIZED HEALTH CARE EDUCATION/TRAINING PROGRAM

## 2024-04-13 PROCEDURE — 83735 ASSAY OF MAGNESIUM: CPT | Performed by: STUDENT IN AN ORGANIZED HEALTH CARE EDUCATION/TRAINING PROGRAM

## 2024-04-13 PROCEDURE — 250N000013 HC RX MED GY IP 250 OP 250 PS 637: Performed by: STUDENT IN AN ORGANIZED HEALTH CARE EDUCATION/TRAINING PROGRAM

## 2024-04-13 PROCEDURE — 84100 ASSAY OF PHOSPHORUS: CPT | Performed by: STUDENT IN AN ORGANIZED HEALTH CARE EDUCATION/TRAINING PROGRAM

## 2024-04-13 PROCEDURE — 99231 SBSQ HOSP IP/OBS SF/LOW 25: CPT | Mod: 24 | Performed by: SURGERY

## 2024-04-13 PROCEDURE — 250N000009 HC RX 250: Performed by: STUDENT IN AN ORGANIZED HEALTH CARE EDUCATION/TRAINING PROGRAM

## 2024-04-13 PROCEDURE — 94640 AIRWAY INHALATION TREATMENT: CPT | Mod: 76

## 2024-04-13 PROCEDURE — 85027 COMPLETE CBC AUTOMATED: CPT | Performed by: STUDENT IN AN ORGANIZED HEALTH CARE EDUCATION/TRAINING PROGRAM

## 2024-04-13 PROCEDURE — 86900 BLOOD TYPING SEROLOGIC ABO: CPT | Performed by: STUDENT IN AN ORGANIZED HEALTH CARE EDUCATION/TRAINING PROGRAM

## 2024-04-13 PROCEDURE — 85007 BL SMEAR W/DIFF WBC COUNT: CPT | Performed by: STUDENT IN AN ORGANIZED HEALTH CARE EDUCATION/TRAINING PROGRAM

## 2024-04-13 PROCEDURE — 86923 COMPATIBILITY TEST ELECTRIC: CPT | Performed by: STUDENT IN AN ORGANIZED HEALTH CARE EDUCATION/TRAINING PROGRAM

## 2024-04-13 PROCEDURE — 99232 SBSQ HOSP IP/OBS MODERATE 35: CPT | Performed by: STUDENT IN AN ORGANIZED HEALTH CARE EDUCATION/TRAINING PROGRAM

## 2024-04-13 PROCEDURE — 120N000001 HC R&B MED SURG/OB

## 2024-04-13 RX ORDER — CLOPIDOGREL BISULFATE 75 MG/1
75 TABLET ORAL DAILY
Status: DISCONTINUED | OUTPATIENT
Start: 2024-04-13 | End: 2024-04-22 | Stop reason: HOSPADM

## 2024-04-13 RX ORDER — DIPHENOXYLATE HCL/ATROPINE 2.5-.025MG
1 TABLET ORAL 4 TIMES DAILY PRN
Status: DISCONTINUED | OUTPATIENT
Start: 2024-04-13 | End: 2024-04-22 | Stop reason: HOSPADM

## 2024-04-13 RX ORDER — LACTOBACILLUS RHAMNOSUS GG 10B CELL
1 CAPSULE ORAL 2 TIMES DAILY
Status: DISCONTINUED | OUTPATIENT
Start: 2024-04-13 | End: 2024-04-22 | Stop reason: HOSPADM

## 2024-04-13 RX ADMIN — HYDROMORPHONE HYDROCHLORIDE 4 MG: 2 TABLET ORAL at 15:48

## 2024-04-13 RX ADMIN — Medication 1 CAPSULE: at 09:16

## 2024-04-13 RX ADMIN — HYDROMORPHONE HYDROCHLORIDE 4 MG: 2 TABLET ORAL at 09:16

## 2024-04-13 RX ADMIN — ALBUTEROL SULFATE 2.5 MG: 2.5 SOLUTION RESPIRATORY (INHALATION) at 19:11

## 2024-04-13 RX ADMIN — ALBUTEROL SULFATE 2.5 MG: 2.5 SOLUTION RESPIRATORY (INHALATION) at 07:06

## 2024-04-13 RX ADMIN — Medication 1 CAPSULE: at 20:36

## 2024-04-13 RX ADMIN — ALBUTEROL SULFATE 2.5 MG: 2.5 SOLUTION RESPIRATORY (INHALATION) at 13:08

## 2024-04-13 RX ADMIN — DIPHENOXYLATE HYDROCHLORIDE AND ATROPINE SULFATE 1 TABLET: 2.5; .025 TABLET ORAL at 11:20

## 2024-04-13 RX ADMIN — FLUTICASONE FUROATE AND VILANTEROL TRIFENATATE 1 PUFF: 200; 25 POWDER RESPIRATORY (INHALATION) at 11:21

## 2024-04-13 RX ADMIN — CARVEDILOL 6.25 MG: 6.25 TABLET, FILM COATED ORAL at 18:14

## 2024-04-13 RX ADMIN — AMLODIPINE BESYLATE 5 MG: 5 TABLET ORAL at 09:16

## 2024-04-13 RX ADMIN — BETAMETHASONE DIPROPIONATE: 0.5 CREAM TOPICAL at 11:20

## 2024-04-13 RX ADMIN — CLOPIDOGREL BISULFATE 75 MG: 75 TABLET ORAL at 11:20

## 2024-04-13 RX ADMIN — CARVEDILOL 6.25 MG: 6.25 TABLET, FILM COATED ORAL at 09:16

## 2024-04-13 ASSESSMENT — ACTIVITIES OF DAILY LIVING (ADL)
ADLS_ACUITY_SCORE: 35
ADLS_ACUITY_SCORE: 41
ADLS_ACUITY_SCORE: 45
ADLS_ACUITY_SCORE: 41
ADLS_ACUITY_SCORE: 41
ADLS_ACUITY_SCORE: 39
ADLS_ACUITY_SCORE: 35
ADLS_ACUITY_SCORE: 41
ADLS_ACUITY_SCORE: 45
ADLS_ACUITY_SCORE: 35
ADLS_ACUITY_SCORE: 39
ADLS_ACUITY_SCORE: 45
ADLS_ACUITY_SCORE: 41
ADLS_ACUITY_SCORE: 35
ADLS_ACUITY_SCORE: 45
ADLS_ACUITY_SCORE: 41
ADLS_ACUITY_SCORE: 35
ADLS_ACUITY_SCORE: 45
ADLS_ACUITY_SCORE: 35
ADLS_ACUITY_SCORE: 41
ADLS_ACUITY_SCORE: 41

## 2024-04-13 NOTE — PLAN OF CARE
Goal Outcome Evaluation:    Date & Time: 4/13/2400 2300-0730pm   Surgery/POD#: 13 R BKA, POD 4 BKA closure  Behavior & Aggression: Green  Fall Risk: Yes  Orientation:A&ox4   ABNL VS/O2: VSS on RA   ABNL Labs: Neg C. Diff stool specimen collection  Pain Management:  Oral dilaudid. Denies pain   Bowel/Bladder: Incontinent of bowel/bladder  Drains: R PICC line infusing NS 15ml/hr TKO. Dialysis port.   Wounds/incisions R/L groin sites CDI  Diet: Renal diet, calorie count. No free water fluid restriction < 1200.   Activity Level: Ax2 lift, Q2 Turn/Repo   Tests/Procedures: None   Anticipated  DC Date: Pending   Significant Information: No acute events overnight.

## 2024-04-13 NOTE — PLAN OF CARE
Date & Time: 4/13/24 7a-3p  Surgery/POD#: POD#12 from R AKA; POD#3 R AKA closure   Behavior & Aggression: green  Fall Risk: Yes  Orientation: A&Ox4  ABNL VS/O2: VSS on 2.5 L NC  ABNL Labs: Hgb 6.7  Pain Management: oral dilaudid x1 for incision pain.   Bowel/Bladder: on hemodialysis, incontinent of bowel, One dose lomotil  Drains: RUE double lumen PICC s/l. Internal jugular dialysis access.  Wounds/incisions: R thigh bruise. R and L groin sites.CDI   Diet:Hi Fiber diet; no free water FR 1200  Activity Level: Ax2 lift, T/R, refuses at times. Encouraged her to continue to work a little each turn, PT/OT session to regain independence  Tests/Procedures: 1U prbc  Anticipated  DC Date: pending ARU eval  Significant Information: Pt very irritable at times. Incont, minimal help with turns-needs 2 assist for clean up. Expressed interest in using lift to commode (good time to get a Good bath!)

## 2024-04-13 NOTE — PLAN OF CARE
Goal Outcome Evaluation:         Date & Time: 4/12/24 0434-3693  Surgery/POD#: POD#12 from R AKA; POD#3 R AKA closure   Behavior & Aggression: green  Fall Risk: Yes  Orientation: A&Ox4  ABNL VS/O2: VSS on 2.5 L NC  ABNL Labs: BG 95  Pain Management: oral dilaudid x1 for incision pain.   Bowel/Bladder: on hemodialysis, incontinent of bowel, one BM.   Drains: RUE double lumen PICC infusing at 15ml/hr. Internal jugular dialysis access.  Wounds/incisions: R thigh bruise. R and L groin sites.CDI   Diet: renal diet, calorie count; no free water FR 1200  Activity Level: Ax2 lift, T/R, refuses repositioning.  Tests/Procedures: Dialysis  Anticipated  DC Date: pending  Significant Information: Vascular/Neph following. Can be agitated at times and noncompliant.

## 2024-04-13 NOTE — PROGRESS NOTES
CALORIE COUNT    Approximate Oral Intake for 4/12:      Calories: 529 kcal  Protein: 35g  2 meals, 3 snacks      Estimated Needs:    Dosing wt: 48 kg (3/29)   Calories: 8252-4951 (25-30 kcal/kg)  Protein: 55-75 grams (1.2-1.5 g/kg)      Summary:    Diet: Gluten Free Diet; Renal Diet (dialysis) + Fluid restriction 1200 ML FLUID    Supplements:   - Ensure Enlive; With Meals (breakfast)  - Gelatein Plus; Between Meals (2 pm)  - Ensure Max Protein (bariatric); With Meals (dinner)  + Rigo for surgical wound healing (BID)    PO intake yesterday = 44% of min energy needs and 63% of min protein needs     Recommendations:   Goal to remain off TF = meet average of >60% of estimated nutrition needs via PO intake     - Calorie counts to continue x2 more days    Vangie Dukes RD, LD  Clinical Dietitian - [facility]

## 2024-04-13 NOTE — PLAN OF CARE
Goal Outcome Evaluation:     Date & Time: 4/13/24 3p-7p  Surgery/POD#: POD#12 from R AKA; POD#3 R AKA closure   Behavior & Aggression: green  Fall Risk: Yes  Orientation: A/O, can be forgetful  ABNL VS/O2: VSS on 2L NC  ABNL Labs: Hgb 6.7, one unit blood given.    Pain Management: PO Dilaudid given x1 for R limb pain  Bowel/Bladder: on hemodialysis, incontinent of bowel, One dose lomotil and probiotics given.    Drains: RUE double lumen PICC s/l. Internal jugular dialysis access WDL.  Wounds/incisions: R AKA CDI, bilateral groin sites.   Diet: FR 1200, pt is celiac positive and has just been changed to Gluten free diet.    Activity Level: A2 or lift, T/R, refuses at times.   Tests/Procedures: blood given today, no electrolyte replacement needed, redraw in AM  Anticipated  DC Date: pending ARU eval    Significant Information: Unable to perform full skin assess this shift, pt  wanted to nap.  No loose stools this shift.  Pt recently diagnosed with celiac disease which is new to her, she was just changed to a gluten free diet this  evening.  She was provided with literature on celiac disease, diet and new menu for ordering, along with some verbal general education.  She will need reinforcement on diet.

## 2024-04-13 NOTE — PROVIDER NOTIFICATION
MD Notification    Notified Person: Hospitalist    Notified Person Name: Marcin White    Notification Date/Time: 4/13/2024 0724    Notification Interaction: Vocera message    Purpose of Notification: Hgb 6.7    Orders Received:    Comments:

## 2024-04-13 NOTE — PROGRESS NOTES
[facility]    Medicine Progress Note - Hospitalist Service    Date of Admission:  3/29/2024  Date of Service: 04/13/2024    Assessment & Plan     Shirley Hendricks is a 63 year old female admitted on 3/29/2024 due to occlusion of right LE bypass graft.      Past medical history significant for PAD/PVD, Tobacco use D/O, CAD (history of MI x3), HTN, HLP, DM2, Neuropathy, COPD, GERD, Anemia, Spongiotic dermatitis, MDD with anxiety, Chronic anticoagulation therapy with history of DVT/PE, OA, Charcot-Breonna-Tooth foot deformity, Marginal zone B-cell lymphoma, History of SBO, History of Takotsubo CM, History of SVT.    Discussed with Dr. Mazariegos and reviewed ED notes and Vascular Surgery consult note.  Patient presented to the ED due to right leg pain that had begun ~ 1 hour prior to presentation.  She reported pain seems to worsen with walking and when she attempted to check a pulse in her leg it was absent.  She also described that the foot is colder than the left.      While in the lobby attempts to find right pedal pulse with doppler were unsuccessful.  Dr. Lozano of Vascular Surgery was contacted by the patient as well as Dr. Mazariegos.      Work-up completed while patient was in the lobby included right arterial LE US revealed the right common femoral artery to mid anterior tibial artery bypass graft occlusion.      After assessing patient labs were collected and included a CMP that revealed a sodium of 132, chloride of 97, CO2 of 21 and glucose of 103 otherwise within normal limits.  CBC with differential revealed an RDW of 15.7 otherwise unremarkable.    Right common femoral artery to mid anterior tibial artery bypass graft acute occlusion  PAD/PVD  Acute Blood Loss Anemia  *Occurred despite low-dose Xarelto and Plavix therapy.    - Vascular surgery consult requested -> urgent guillotine right above knee amputation today followed by close monitoring in the ICU.    - Tentative plan to  return to OR today for formalization of AKA with Dr. Diaz   - IR was consulted and following  --Per Vascular surgery consult Dr. Salomon was contacted and s/p right LE angiogram and lytic therapy 03/30 -> 03/31 took the patient to the angio suite to remove the sheath in the left groin with closure device.   --IR now signed off  - Hold PTA Xarelto 15 mg nightly.    - Defer resumption of PTA Plavix 75 mg/d to Vascular Surgery and/or IR -> plavix to restart once platelets improve, continue to monitor bypass in LLE   - Statin and antihypertensive management as below.    - Pain management as needed  - Follow Hgb daily  - transfused 1 U PRBCs 03/30 / 03/31 and 04/02 and 04/06 and 04/09 and 04/12 closely monitor.  - CBC daily, transfuse as needed Hgb < 7.0  - PT / OT eval -> TCU   - SW consulted  - Peripheral Smear pending    Rhabdomyolysis   ARF  Acute Hyponatremia  Assessment: CPK has been rising. Her serum creatinine and body urea nitrogen also rising. Renal US -> No obstruction demonstrated.   Plan:  - Follow CK / BMP daily -> Cr stable  - Nephrology following -> now on HD, Decreased BP following dilaudid for pain and 25% albumin ordered with improvement in BPs first day of HD  - Hyponatremia mgtmt per renal service -> improving  - Avoid NSAIDs / nephrotoxins    Colon distention  Abdominal Pain  Assessment:CT abdomen 04/03 -> Medium-sized right retroperitoneal and extraperitoneal hematoma. Evaluation for extravasation is unable to be performed without contrast. This is likely similar to slightly smaller than the CT lumbar spine although this is incompletely visualized at   that time  Extensive pulmonary opacities. Differential: Multifocal infection, ARDS, and/or pulmonary  Plan:  --NG placed -> remove in coming days as GI issues resolve -> now removed  --Can stop TFs -> Dialysis diet -> ADAT  --Hold Anticoagulation for now given anemia  --GI following ->  consider flex sig/colonoscopy as outpatient -> may need to  consider here if keeps losing Hgb  --Repeat flat AXR 4/5: Nonobstructive bowel gas pattern. Gaseous distention of the colon. Enteric feeding tube tip in the fourth portion of the duodenum. Iliac stents, cholecystectomy clips, and endoscopic clips in the right hemiabdomen.   --Continue to monitor  --Pain control as needed  --C Diff pending for loose stools    MDD with anxiety  Acute Delirium   *Noted on chart review.  No interventions.    Delirium from acute illness  Plan:  Delirium has mostly resolved  Continue to maintain delirium precautions.   IM Zyprexa as needed    Contrast dye allergy  - Ordered solumedrol and benadryl per contrast dye allergy protocol.      Mild hyponatremia  - BMP daily -> improving    Tobacco Use D/O   Patient currently smokes max 5 cigarettes per day.     - Counseled regarding smoking cessation that should also continue with PCP.    - Nicoderm patch ordered.    Recent celiac disease  *Patient reported recent GI work-up revealed she has celiac disease.    - Once diet can be advanced would request no gluten/celiac diet.      CAD (history of MI x3)  HTN  HLP  *Last coronary angiogram completed 10/2023.    - Resumed on PTA Coreg 6.25 mg BID, hold lisinopril 10 mg/d due to LIMA, continue Norvasc 2.5 mg/d.  Hold parameters in place.    - hold PTA rosuvastatin 40 mg/d due to acute rhabdo  - PRN IV hydralazine 10 mg every 4 hours for SBP GREATER THAN 180.      History of Takotsubo CM  *Recovered EF (55-60%) from ECHO 11/2023.    - Resumed on PTA Coreg 6.25 mg BID (hold for SBP < 110), hold lisinopril 10 mg/d and  hold Jardiance 10 mg/d For now given ARF    Type 2 DM  Diabetic neuropathy  *Noted diagnosis on chart review.  However, A1c of 5.2%.    - Hold PTA Jardiance 10 mg/d and gabapentin 100 mg TID due to AMS.  - No insulin as borderline hypoglycemic    COPD   - Resumed on PTA inhalers.    - Encourage use of Flutter valve/IS.      GERD  - Resumed on PTA omeprazole 20 mg/d.      Anemia  Recent GI  bleed (12/2023 and admitted at [facility])  - Hold PTA Iron supplements and resume at discharge.    - CBC daily      Spongiotic dermatitis  *Recently seen at outpatient Derm.    - Resumed on PTA betamethasone cream BID.      Chronic anticoagulation therapy with history of DVT/PE  - Hold PTA Xarelto.      OA  - Pain management as above.      Charcot-Breonna-Tooth foot deformity  *Noted on chart review.  No interventions.     Stage VALENTIN marginal zone B-cell lymphoma  *Follows with [facility] (Dr. Barrow).    - Continue outpatient surveillance (CT scan in 8/2024).      History of SBO  *Noted on chart review.  No interventions.    History of SVT  - Resumed on PTA Coreg as above.            Diet: Snacks/Supplements Adult: Gelatein Plus; Between Meals  Snacks/Supplements Adult: Ensure Max Protein (bariatric); With Meals  Snacks/Supplements Adult: Ensure Enlive; With Meals  Fluid restriction 1200 ML FLUID  Calorie Counts  High Fiber Diet    DVT Prophylaxis: Pneumatic Compression Devices  Alvarez Catheter: Not present  Lines: PRESENT      PICC 03/31/24 Double Lumen Right Brachial vein medial access-Site Assessment: WDL  CVC Double Lumen Right Internal jugular Tunneled-Site Assessment: WDL      Cardiac Monitoring: None  Code Status: Full Code      Clinically Significant Risk Factors         # Hyponatremia: Lowest Na = 127 mmol/L in last 2 days, will monitor as appropriate    # Hypomagnesemia: Lowest Mg = 1.6 mg/dL in last 2 days, will replace as needed   # Hypoalbuminemia: Lowest albumin = 1.8 g/dL at 4/9/2024  6:36 AM, will monitor as appropriate  # Coagulation Defect: INR = 1.31 (Ref range: 0.85 - 1.15) and/or PTT = N/A, will monitor for bleeding    # Hypertension: Noted on problem list         # Severe Malnutrition: based on nutrition assessment    # Financial/Environmental Concerns:           Disposition Plan      Expected Discharge Date: 04/15/2024        Discharge Comments: TF, R amp, PT/OT  Neph=dilaysis  Wound Vac           Marcin White MD  Hospitalist Service  [facility]  Securely message with [employer] (more info)  Text page via [employer] Paging/Directory   ______________________________________________________________________    Interval History     No acute events overnight  Resting in bed.  Pain controlled  Hgb down to 6.7 but no bloody stools, no hematuria / no hematemesis  No fevers  No new CP/SOB   No nausea / vomiting  No new complaints otherwise  Calm and pleasant today    Physical Exam   Vital Signs: Temp: 98.2  F (36.8  C) Temp src: Oral BP: 128/55 Pulse: 76   Resp: 16 SpO2: (!) 89 % O2 Device: None (Room air) Oxygen Delivery: 2 LPM  Weight: 128 lbs 15.51 oz      Constitutional: Awake, alert, no apparent distress.    Pulmonary: CTABL  Cardiovascular: Regular rate and rhythm, normal S1 and S2, no S3 or S4, and no murmur noted. 2+ pitting edema bilaterally.  GI: Normal bowel sounds, soft, non-distended, non-tender.    Neuro: Fahad but has sensory loss and is not able to wiggle toes but is able to plantarflex and dorsiflex.   Psych:  Alert and oriented x3. Fahad. Normal affect.  Extremities: Right AKA wound with dressing.  ----------------------------------------------------------------------------------------    Medical Decision Making       39 MINUTES SPENT BY ME on the date of service doing chart review, history, exam, documentation & further activities per the note.      Data   ------------------------- PAST 24 HR DATA REVIEWED -----------------------------------------------    I have personally reviewed the following data over the past 24 hrs:    6.0; 6.0  \   6.7 (LL); 6.7 (LL)   / 157; 157     130 (L) 95 (L) 52.4 (H) /  81   4.2 25 2.62 (H) \       Imaging results reviewed over the past 24 hrs:   No results found for this or any previous visit (from the past 24 hour(s)).    ------------------------- ENCOUNTER LABS ----------------------------------------------------------------  Recent Labs   Lab  04/13/24  1316 04/13/24  0655 04/12/24  2143 04/12/24  0603 04/12/24  0553 04/11/24  0800 04/11/24  0643 04/09/24  2121 04/09/24  1817 04/09/24  1212 04/09/24  0636   WBC  --  6.0  6.0  --   --   --   --  5.0  --  3.9*  --  3.9*   HGB  --  6.7*  6.7*  --   --   --   --  8.4*  --  9.0*  --  6.7*   MCV  --  88  88  --   --   --   --  86  --  86  --  84   PLT  --  157  157  --   --   --   --  151  --  105*  --  48*   INR  --   --   --   --   --   --  1.31*  --   --   --   --    NA  --  130*  --   --  127*  --  128*   < > 124*  --  127*   POTASSIUM  --  4.2  --   --  4.9  --  4.6  4.7   < > 5.5*  --  4.9   CHLORIDE  --  95*  --   --  93*  --  93*   < > 91*  --  94*   CO2  --  25  --   --  21*  --  26   < > 23  --  25   BUN  --  52.4*  --   --  76.4*  --  45.9*   < > 54.0*  --  48.3*   CR  --  2.62*  --   --  3.17*  --  2.56*   < > 2.84*  --  2.66*   ANIONGAP  --  10  --   --  13  --  9   < > 10  --  8   SONIA  --  7.2*  --   --  7.6*  --  7.4*   < > 7.4*  --  7.1*   GLC 81 69* 95   < > 60*   < > 74   < > 112*   < > 78   ALBUMIN  --   --   --   --   --   --   --   --   --   --  1.8*   PROTTOTAL  --   --   --   --   --   --   --   --   --   --  3.9*   BILITOTAL  --   --   --   --   --   --   --   --   --   --  0.9   ALKPHOS  --   --   --   --   --   --   --   --   --   --  155*   ALT  --   --   --   --   --   --   --   --   --   --  53*   AST  --   --   --   --   --   --   --   --   --   --  68*    < > = values in this interval not displayed.       Most Recent 3 CBC's:  Recent Labs   Lab Test 04/13/24  0655 04/11/24  0643 04/09/24  1817   WBC 6.0  6.0 5.0 3.9*   HGB 6.7*  6.7* 8.4* 9.0*   MCV 88  88 86 86     157 151 105*     Most Recent 3 BMP's:  Recent Labs   Lab Test 04/13/24  1316 04/13/24  0655 04/12/24  2143 04/12/24  0603 04/12/24  0553 04/11/24  0800 04/11/24  0643   NA  --  130*  --   --  127*  --  128*   POTASSIUM  --  4.2  --   --  4.9  --  4.6  4.7   CHLORIDE  --  95*  --   --  93*  --  93*    CO2  --  25  --   --  21*  --  26   BUN  --  52.4*  --   --  76.4*  --  45.9*   CR  --  2.62*  --   --  3.17*  --  2.56*   ANIONGAP  --  10  --   --  13  --  9   SONIA  --  7.2*  --   --  7.6*  --  7.4*   GLC 81 69* 95   < > 60*   < > 74    < > = values in this interval not displayed.     Most Recent 2 LFT's:  Recent Labs   Lab Test 04/09/24  0636 04/04/24  0541   AST 68* 301*   ALT 53* 326*   ALKPHOS 155* 192*   BILITOTAL 0.9 0.6     Most Recent 3 INR's:  Recent Labs   Lab Test 04/11/24  0643 10/07/23  0516 10/06/23  0551   INR 1.31* 1.38* 1.08     Most Recent 3 Troponin's:  Recent Labs   Lab Test 06/10/20  1243 02/16/20  1824 09/18/19  0739   TROPI <0.015 <0.015 <0.015     Most Recent 3 BNP's:No lab results found.  Most Recent D-dimer:  Recent Labs   Lab Test 08/13/21  0934   DD 10.38*     Most Recent Cholesterol Panel:  Recent Labs   Lab Test 10/03/23  0941   CHOL 137   LDL 69   HDL 54   TRIG 68     Most Recent 6 Bacteria Isolates From Any Culture (See EPIC Reports for Culture Details):No lab results found.  Most Recent TSH and T4:  Recent Labs   Lab Test 04/10/24  0014 02/04/19  0934 03/28/17  0922   TSH 2.53   < > 1.37   T4  --   --  1.19    < > = values in this interval not displayed.     Most Recent Urinalysis:  Recent Labs   Lab Test 02/04/19  0935   COLOR Yellow   APPEARANCE Clear   URINEGLC Negative   URINEBILI Negative   URINEKETONE Negative   SG 1.010   UBLD Negative   URINEPH 6.0   PROTEIN Negative   UROBILINOGEN 0.2   NITRITE Negative   LEUKEST Negative   RBCU O - 2   WBCU 0 - 5     Most Recent ESR & CRP:  Recent Labs   Lab Test 11/13/23  1705   SED 39*

## 2024-04-13 NOTE — PROGRESS NOTES
VASCULAR SURGERY    Overall comfortable this morning.  No nausea.  Tolerating diet.  Still with very loose stools--unable to control bowels.  No evidence of melanotic stool.    Right AKA still sore but otherwise unremarkable.  Edema is noted in the left leg with palpable graft pulse and no ischemic changes.  Abdomen is soft and nontender.      Laboratory: Sodium = 130 K= 4.2 SCr= 2.62   glucose= 69   hemoglobin= 6.7 platelet= 157      Impression: #1.  Stable following right AKA for nonsalvageable limb with graft occlusion    Completion amputation and closure has been performed.       #2.  Patent left femoral to popliteal in situ bypass graft.     #3.  Oliguric LIMA-on hemodialysis performed yesterday.     #4.  Ongoing anemia with no obvious blood loss source.  Will Hemoccult stools but no evidence of melena.  Hgb= 8.4 in 04/11/2024 .  Will hold off on transfusion and recheck CBC in the morning.     #5.  Ongoing very loose stools.  C. difficile - 4/12/2024.  Will add fiber to diet.  Try to get patient up in recliner.         Chadwick Lozano MD

## 2024-04-14 LAB
BASOPHILS # BLD AUTO: ABNORMAL 10*3/UL
BASOPHILS # BLD MANUAL: 0.1 10E3/UL (ref 0–0.2)
BASOPHILS NFR BLD AUTO: ABNORMAL %
BASOPHILS NFR BLD MANUAL: 1 %
BURR CELLS BLD QL SMEAR: SLIGHT
EOSINOPHIL # BLD AUTO: ABNORMAL 10*3/UL
EOSINOPHIL # BLD MANUAL: 0.2 10E3/UL (ref 0–0.7)
EOSINOPHIL NFR BLD AUTO: ABNORMAL %
EOSINOPHIL NFR BLD MANUAL: 2 %
ERYTHROCYTE [DISTWIDTH] IN BLOOD BY AUTOMATED COUNT: 15.4 % (ref 10–15)
GLUCOSE BLDC GLUCOMTR-MCNC: 73 MG/DL (ref 70–99)
GLUCOSE BLDC GLUCOMTR-MCNC: 94 MG/DL (ref 70–99)
HCT VFR BLD AUTO: 27.3 % (ref 35–47)
HGB BLD-MCNC: 9.2 G/DL (ref 11.7–15.7)
IMM GRANULOCYTES # BLD: ABNORMAL 10*3/UL
IMM GRANULOCYTES NFR BLD: ABNORMAL %
LYMPHOCYTES # BLD AUTO: ABNORMAL 10*3/UL
LYMPHOCYTES # BLD MANUAL: 0.6 10E3/UL (ref 0.8–5.3)
LYMPHOCYTES NFR BLD AUTO: ABNORMAL %
LYMPHOCYTES NFR BLD MANUAL: 7 %
MAGNESIUM SERPL-MCNC: 1.7 MG/DL (ref 1.7–2.3)
MCH RBC QN AUTO: 29.8 PG (ref 26.5–33)
MCHC RBC AUTO-ENTMCNC: 33.7 G/DL (ref 31.5–36.5)
MCV RBC AUTO: 88 FL (ref 78–100)
METAMYELOCYTES # BLD MANUAL: 0.2 10E3/UL
METAMYELOCYTES NFR BLD MANUAL: 2 %
MONOCYTES # BLD AUTO: ABNORMAL 10*3/UL
MONOCYTES # BLD MANUAL: 0.2 10E3/UL (ref 0–1.3)
MONOCYTES NFR BLD AUTO: ABNORMAL %
MONOCYTES NFR BLD MANUAL: 3 %
MYELOCYTES # BLD MANUAL: 0.1 10E3/UL
MYELOCYTES NFR BLD MANUAL: 1 %
NEUTROPHILS # BLD AUTO: ABNORMAL 10*3/UL
NEUTROPHILS # BLD MANUAL: 6.6 10E3/UL (ref 1.6–8.3)
NEUTROPHILS NFR BLD AUTO: ABNORMAL %
NEUTROPHILS NFR BLD MANUAL: 84 %
NRBC # BLD AUTO: 0 10E3/UL
NRBC BLD AUTO-RTO: 1 /100
PHOSPHATE SERPL-MCNC: 4 MG/DL (ref 2.5–4.5)
PLAT MORPH BLD: ABNORMAL
PLATELET # BLD AUTO: 196 10E3/UL (ref 150–450)
POTASSIUM SERPL-SCNC: 4.5 MMOL/L (ref 3.4–5.3)
RBC # BLD AUTO: 3.09 10E6/UL (ref 3.8–5.2)
RBC MORPH BLD: ABNORMAL
RETICS # AUTO: 0.11 10E6/UL (ref 0.03–0.1)
RETICS/RBC NFR AUTO: 3.4 % (ref 0.5–2)
WBC # BLD AUTO: 7.9 10E3/UL (ref 4–11)

## 2024-04-14 PROCEDURE — 84132 ASSAY OF SERUM POTASSIUM: CPT | Performed by: STUDENT IN AN ORGANIZED HEALTH CARE EDUCATION/TRAINING PROGRAM

## 2024-04-14 PROCEDURE — 250N000011 HC RX IP 250 OP 636: Performed by: STUDENT IN AN ORGANIZED HEALTH CARE EDUCATION/TRAINING PROGRAM

## 2024-04-14 PROCEDURE — 83735 ASSAY OF MAGNESIUM: CPT | Performed by: STUDENT IN AN ORGANIZED HEALTH CARE EDUCATION/TRAINING PROGRAM

## 2024-04-14 PROCEDURE — 250N000013 HC RX MED GY IP 250 OP 250 PS 637: Performed by: STUDENT IN AN ORGANIZED HEALTH CARE EDUCATION/TRAINING PROGRAM

## 2024-04-14 PROCEDURE — 94640 AIRWAY INHALATION TREATMENT: CPT

## 2024-04-14 PROCEDURE — 999N000157 HC STATISTIC RCP TIME EA 10 MIN

## 2024-04-14 PROCEDURE — 85045 AUTOMATED RETICULOCYTE COUNT: CPT | Performed by: STUDENT IN AN ORGANIZED HEALTH CARE EDUCATION/TRAINING PROGRAM

## 2024-04-14 PROCEDURE — 85007 BL SMEAR W/DIFF WBC COUNT: CPT | Performed by: STUDENT IN AN ORGANIZED HEALTH CARE EDUCATION/TRAINING PROGRAM

## 2024-04-14 PROCEDURE — 250N000009 HC RX 250: Performed by: STUDENT IN AN ORGANIZED HEALTH CARE EDUCATION/TRAINING PROGRAM

## 2024-04-14 PROCEDURE — 84100 ASSAY OF PHOSPHORUS: CPT | Performed by: STUDENT IN AN ORGANIZED HEALTH CARE EDUCATION/TRAINING PROGRAM

## 2024-04-14 PROCEDURE — 99207 PR NO BILLABLE SERVICE THIS VISIT: CPT | Performed by: INTERNAL MEDICINE

## 2024-04-14 PROCEDURE — 94640 AIRWAY INHALATION TREATMENT: CPT | Mod: 76

## 2024-04-14 PROCEDURE — 120N000001 HC R&B MED SURG/OB

## 2024-04-14 PROCEDURE — 99232 SBSQ HOSP IP/OBS MODERATE 35: CPT | Performed by: STUDENT IN AN ORGANIZED HEALTH CARE EDUCATION/TRAINING PROGRAM

## 2024-04-14 PROCEDURE — 85014 HEMATOCRIT: CPT | Performed by: STUDENT IN AN ORGANIZED HEALTH CARE EDUCATION/TRAINING PROGRAM

## 2024-04-14 RX ADMIN — NICOTINE 1 PATCH: 14 PATCH, EXTENDED RELEASE TRANSDERMAL at 08:49

## 2024-04-14 RX ADMIN — HYDROMORPHONE HYDROCHLORIDE 4 MG: 2 TABLET ORAL at 14:13

## 2024-04-14 RX ADMIN — MICONAZOLE NITRATE: 2 POWDER TOPICAL at 14:11

## 2024-04-14 RX ADMIN — FAMOTIDINE 20 MG: 10 INJECTION, SOLUTION INTRAVENOUS at 05:44

## 2024-04-14 RX ADMIN — Medication 1 CAPSULE: at 21:10

## 2024-04-14 RX ADMIN — ALBUTEROL SULFATE 2.5 MG: 2.5 SOLUTION RESPIRATORY (INHALATION) at 13:44

## 2024-04-14 RX ADMIN — ALBUTEROL SULFATE 2.5 MG: 2.5 SOLUTION RESPIRATORY (INHALATION) at 21:13

## 2024-04-14 RX ADMIN — Medication 1 CAPSULE: at 08:49

## 2024-04-14 RX ADMIN — AMLODIPINE BESYLATE 5 MG: 5 TABLET ORAL at 08:49

## 2024-04-14 RX ADMIN — CARVEDILOL 6.25 MG: 6.25 TABLET, FILM COATED ORAL at 08:49

## 2024-04-14 RX ADMIN — CARVEDILOL 6.25 MG: 6.25 TABLET, FILM COATED ORAL at 18:12

## 2024-04-14 RX ADMIN — HYDROMORPHONE HYDROCHLORIDE 4 MG: 2 TABLET ORAL at 21:11

## 2024-04-14 RX ADMIN — FLUTICASONE FUROATE AND VILANTEROL TRIFENATATE 1 PUFF: 200; 25 POWDER RESPIRATORY (INHALATION) at 08:55

## 2024-04-14 RX ADMIN — MICONAZOLE NITRATE: 2 POWDER TOPICAL at 22:00

## 2024-04-14 RX ADMIN — ACETAMINOPHEN 650 MG: 325 TABLET, FILM COATED ORAL at 21:11

## 2024-04-14 RX ADMIN — DIPHENOXYLATE HYDROCHLORIDE AND ATROPINE SULFATE 1 TABLET: 2.5; .025 TABLET ORAL at 11:54

## 2024-04-14 RX ADMIN — Medication 1 PACKET: at 14:10

## 2024-04-14 RX ADMIN — CLOPIDOGREL BISULFATE 75 MG: 75 TABLET ORAL at 08:49

## 2024-04-14 ASSESSMENT — ACTIVITIES OF DAILY LIVING (ADL)
ADLS_ACUITY_SCORE: 42
ADLS_ACUITY_SCORE: 43
ADLS_ACUITY_SCORE: 42
ADLS_ACUITY_SCORE: 43
ADLS_ACUITY_SCORE: 43
ADLS_ACUITY_SCORE: 42
ADLS_ACUITY_SCORE: 43
ADLS_ACUITY_SCORE: 43
ADLS_ACUITY_SCORE: 42
ADLS_ACUITY_SCORE: 43
ADLS_ACUITY_SCORE: 38
ADLS_ACUITY_SCORE: 43
ADLS_ACUITY_SCORE: 44
ADLS_ACUITY_SCORE: 43
ADLS_ACUITY_SCORE: 39

## 2024-04-14 NOTE — PROGRESS NOTES
Notes, labs, vitals reviewed.    She has a positional catheter that has precluded receiving good dialysis on Weds and Fri.  BFR was only 150 ml/min on Fri.    IR to replace TDC tmrw.

## 2024-04-14 NOTE — PROGRESS NOTES
[facility]    Medicine Progress Note - Hospitalist Service    Date of Admission:  3/29/2024  Date of Service: 04/14/2024    Assessment & Plan     Shirley Hendricks is a 63 year old female admitted on 3/29/2024 due to occlusion of right LE bypass graft.      Past medical history significant for PAD/PVD, Tobacco use D/O, CAD (history of MI x3), HTN, HLP, DM2, Neuropathy, COPD, GERD, Anemia, Spongiotic dermatitis, MDD with anxiety, Chronic anticoagulation therapy with history of DVT/PE, OA, Charcot-Breonna-Tooth foot deformity, Marginal zone B-cell lymphoma, History of SBO, History of Takotsubo CM, History of SVT.    Discussed with Dr. Mazariegos and reviewed ED notes and Vascular Surgery consult note.  Patient presented to the ED due to right leg pain that had begun ~ 1 hour prior to presentation.  She reported pain seems to worsen with walking and when she attempted to check a pulse in her leg it was absent.  She also described that the foot is colder than the left.      While in the lobby attempts to find right pedal pulse with doppler were unsuccessful.  Dr. Lozano of Vascular Surgery was contacted by the patient as well as Dr. Mazariegos.      Work-up completed while patient was in the lobby included right arterial LE US revealed the right common femoral artery to mid anterior tibial artery bypass graft occlusion.      After assessing patient labs were collected and included a CMP that revealed a sodium of 132, chloride of 97, CO2 of 21 and glucose of 103 otherwise within normal limits.  CBC with differential revealed an RDW of 15.7 otherwise unremarkable.    Right common femoral artery to mid anterior tibial artery bypass graft acute occlusion  PAD/PVD  Acute Blood Loss Anemia  *Occurred despite low-dose Xarelto and Plavix therapy.    - Vascular surgery consult requested -> urgent guillotine right above knee amputation today followed by close monitoring in the ICU.    - Tentative plan to  return to OR today for formalization of AKA with Dr. Diaz   - IR was consulted and following  --Per Vascular surgery consult Dr. Salomon was contacted and s/p right LE angiogram and lytic therapy 03/30 -> 03/31 took the patient to the angio suite to remove the sheath in the left groin with closure device.   --IR now signed off  - Hold PTA Xarelto 15 mg nightly.    - Defer resumption of PTA Plavix 75 mg/d to Vascular Surgery and/or IR -> plavix to restart once platelets improve, continue to monitor bypass in LLE   - Statin and antihypertensive management as below.    - Pain management as needed  - Follow Hgb daily  - transfused 1 U PRBCs 03/30 / 03/31 and 04/02 and 04/06 and 04/09 and 04/13 closely monitor.  - CBC daily, transfuse as needed Hgb < 7.0  - PT / OT eval -> TCU   - SW consulted  - Peripheral Smear pending    Rhabdomyolysis   ARF  Acute Hyponatremia  Assessment: CPK has been rising. Her serum creatinine and body urea nitrogen also rising. Renal US -> No obstruction demonstrated.   Plan:  - Follow CK / BMP daily -> Cr stable  - Nephrology following -> now on HD, Decreased BP following dilaudid for pain and 25% albumin ordered with improvement in BPs first day of HD  - Hyponatremia mgtmt per renal service -> improving  - Avoid NSAIDs / nephrotoxins    Colon distention  Abdominal Pain  Assessment:CT abdomen 04/03 -> Medium-sized right retroperitoneal and extraperitoneal hematoma. Evaluation for extravasation is unable to be performed without contrast. This is likely similar to slightly smaller than the CT lumbar spine although this is incompletely visualized at   that time  Extensive pulmonary opacities. Differential: Multifocal infection, ARDS, and/or pulmonary  Plan:  --NG placed -> remove in coming days as GI issues resolve -> now removed  --Can stop TFs -> Dialysis diet -> ADAT  --Hold Anticoagulation for now given anemia  --GI following ->  consider flex sig/colonoscopy as outpatient -> may need to  consider here if keeps losing Hgb  --Repeat flat AXR 4/5: Nonobstructive bowel gas pattern. Gaseous distention of the colon. Enteric feeding tube tip in the fourth portion of the duodenum. Iliac stents, cholecystectomy clips, and endoscopic clips in the right hemiabdomen.   --Continue to monitor  --Pain control as needed  --C Diff negative    MDD with anxiety  Acute Delirium   *Noted on chart review.  No interventions.    Delirium from acute illness  Plan:  Delirium has mostly resolved  Continue to maintain delirium precautions.   IM Zyprexa as needed    Contrast dye allergy  - Ordered solumedrol and benadryl per contrast dye allergy protocol.      Mild hyponatremia  - BMP daily -> improving    Tobacco Use D/O   Patient currently smokes max 5 cigarettes per day.     - Counseled regarding smoking cessation that should also continue with PCP.    - Nicoderm patch ordered.    Recent celiac disease  *Patient reported recent GI work-up revealed she has celiac disease.    - Once diet can be advanced would request no gluten/celiac diet.      CAD (history of MI x3)  HTN  HLP  *Last coronary angiogram completed 10/2023.    - Resumed on PTA Coreg 6.25 mg BID, hold lisinopril 10 mg/d due to LIMA, continue Norvasc 2.5 mg/d.  Hold parameters in place.    - hold PTA rosuvastatin 40 mg/d due to acute rhabdo  - PRN IV hydralazine 10 mg every 4 hours for SBP GREATER THAN 180.      History of Takotsubo CM  *Recovered EF (55-60%) from ECHO 11/2023.    - Resumed on PTA Coreg 6.25 mg BID (hold for SBP < 110), hold lisinopril 10 mg/d and  hold Jardiance 10 mg/d For now given ARF    Type 2 DM  Diabetic neuropathy  *Noted diagnosis on chart review.  However, A1c of 5.2%.    - Hold PTA Jardiance 10 mg/d and gabapentin 100 mg TID due to AMS.  - No insulin as borderline hypoglycemic    COPD   - Resumed on PTA inhalers.    - Encourage use of Flutter valve/IS.      GERD  - Resumed on PTA omeprazole 20 mg/d.      Anemia  Recent GI bleed (12/2023  "and admitted at [facility])  - Hold PTA Iron supplements and resume at discharge.    - CBC daily      Spongiotic dermatitis  *Recently seen at outpatient Derm.    - Resumed on PTA betamethasone cream BID.      Chronic anticoagulation therapy with history of DVT/PE  - Hold PTA Xarelto.      OA  - Pain management as above.      Charcot-Breonna-Tooth foot deformity  *Noted on chart review.  No interventions.     Stage VALENTIN marginal zone B-cell lymphoma  *Follows with [facility] (Dr. Barrow).    - Continue outpatient surveillance (CT scan in 8/2024).      History of SBO  *Noted on chart review.  No interventions.    History of SVT  - Resumed on PTA Coreg as above.            Diet: Snacks/Supplements Adult: Gelatein Plus; Between Meals  Snacks/Supplements Adult: Ensure Max Protein (bariatric); With Meals  Snacks/Supplements Adult: Ensure Enlive; With Meals  Fluid restriction 1200 ML FLUID  Calorie Counts  Gluten Free Diet    DVT Prophylaxis: Pneumatic Compression Devices  Alvarez Catheter: Not present  Lines: PRESENT      PICC 03/31/24 Double Lumen Right Brachial vein medial access-Site Assessment: WDL  CVC Double Lumen Right Internal jugular Tunneled-Site Assessment: WDL      Cardiac Monitoring: None  Code Status: Full Code      Clinically Significant Risk Factors            # Hypomagnesemia: Lowest Mg = 1.6 mg/dL in last 2 days, will replace as needed   # Hypoalbuminemia: Lowest albumin = 1.8 g/dL at 4/9/2024  6:36 AM, will monitor as appropriate     # Hypertension: Noted on problem list        # Overweight: Estimated body mass index is 25.08 kg/m  as calculated from the following:    Height as of this encounter: 1.549 m (5' 1\").    Weight as of this encounter: 60.2 kg (132 lb 11.5 oz).   # Severe Malnutrition: based on nutrition assessment    # Financial/Environmental Concerns:           Disposition Plan     Expected Discharge Date: 04/15/2024        Discharge Comments: TF, R amp, PT/OT  Neph=dilaysis  Wound Vac    "       Marcin White MD  Hospitalist Service  [facility]  Securely message with [employer] (more info)  Text page via [employer] Paging/Directory   ______________________________________________________________________    Interval History     No acute events overnight  Resting in bed comfortably  Pain controlled  Hgb responded well to transfusion   No bloody stools, no hematuria / no hematemesis  No fevers  No new CP/SOB   No nausea / vomiting  No new complaints otherwise  Calm and pleasant today    Physical Exam   Vital Signs: Temp: 98.4  F (36.9  C) Temp src: Oral BP: (!) 153/62 Pulse: 69   Resp: 16 SpO2: 93 % O2 Device: Nasal cannula Oxygen Delivery: 2 LPM  Weight: 132 lbs 11.47 oz      Constitutional: Awake, alert, no apparent distress.    Pulmonary: CTABL  Cardiovascular: Regular rate and rhythm, normal S1 and S2, no S3 or S4, and no murmur noted. 2+ pitting edema bilaterally.  GI: Normal bowel sounds, soft, non-distended, non-tender.    Neuro: Fahad but has sensory loss and is not able to wiggle toes but is able to plantarflex and dorsiflex.   Psych:  Alert and oriented x3. Fahad. Normal affect.  Extremities: Right AKA wound with dressing.  ----------------------------------------------------------------------------------------    Medical Decision Making       35 MINUTES SPENT BY ME on the date of service doing chart review, history, exam, documentation & further activities per the note.      Data   ------------------------- PAST 24 HR DATA REVIEWED -----------------------------------------------    I have personally reviewed the following data over the past 24 hrs:    7.9  \   9.2 (L)   / 196     N/A N/A N/A /  73   4.5 N/A N/A \     Ferritin:  N/A % Retic:  3.4 (H) LDH:  N/A       Imaging results reviewed over the past 24 hrs:   No results found for this or any previous visit (from the past 24 hour(s)).    ------------------------- ENCOUNTER LABS  ----------------------------------------------------------------  Recent Labs   Lab 04/14/24  0542 04/14/24  0538 04/14/24  0205 04/13/24  2226 04/13/24  1316 04/13/24  0655 04/12/24  0603 04/12/24  0553 04/11/24  0800 04/11/24  0643 04/09/24  1212 04/09/24  0636   WBC 7.9  --   --   --   --  6.0  6.0  --   --   --  5.0   < > 3.9*   HGB 9.2*  --   --   --   --  6.7*  6.7*  --   --   --  8.4*   < > 6.7*   MCV 88  --   --   --   --  88  88  --   --   --  86   < > 84     --   --   --   --  157  157  --   --   --  151   < > 48*   INR  --   --   --   --   --   --   --   --   --  1.31*  --   --    NA  --   --   --   --   --  130*  --  127*  --  128*   < > 127*   POTASSIUM  --  4.5  --   --   --  4.2  --  4.9  --  4.6  4.7   < > 4.9   CHLORIDE  --   --   --   --   --  95*  --  93*  --  93*   < > 94*   CO2  --   --   --   --   --  25  --  21*  --  26   < > 25   BUN  --   --   --   --   --  52.4*  --  76.4*  --  45.9*   < > 48.3*   CR  --   --   --   --   --  2.62*  --  3.17*  --  2.56*   < > 2.66*   ANIONGAP  --   --   --   --   --  10  --  13  --  9   < > 8   SONIA  --   --   --   --   --  7.2*  --  7.6*  --  7.4*   < > 7.1*   GLC  --   --  73 79 81 69*   < > 60*   < > 74   < > 78   ALBUMIN  --   --   --   --   --   --   --   --   --   --   --  1.8*   PROTTOTAL  --   --   --   --   --   --   --   --   --   --   --  3.9*   BILITOTAL  --   --   --   --   --   --   --   --   --   --   --  0.9   ALKPHOS  --   --   --   --   --   --   --   --   --   --   --  155*   ALT  --   --   --   --   --   --   --   --   --   --   --  53*   AST  --   --   --   --   --   --   --   --   --   --   --  68*    < > = values in this interval not displayed.       Most Recent 3 CBC's:  Recent Labs   Lab Test 04/14/24  0542 04/13/24  0655 04/11/24  0643   WBC 7.9 6.0  6.0 5.0   HGB 9.2* 6.7*  6.7* 8.4*   MCV 88 88  88 86    157  157 151     Most Recent 3 BMP's:  Recent Labs   Lab Test 04/14/24  0538 04/14/24  0205 04/13/24  2226  04/13/24  1316 04/13/24  0655 04/12/24  0603 04/12/24  0553 04/11/24  0800 04/11/24  0643   NA  --   --   --   --  130*  --  127*  --  128*   POTASSIUM 4.5  --   --   --  4.2  --  4.9  --  4.6  4.7   CHLORIDE  --   --   --   --  95*  --  93*  --  93*   CO2  --   --   --   --  25  --  21*  --  26   BUN  --   --   --   --  52.4*  --  76.4*  --  45.9*   CR  --   --   --   --  2.62*  --  3.17*  --  2.56*   ANIONGAP  --   --   --   --  10  --  13  --  9   SONIA  --   --   --   --  7.2*  --  7.6*  --  7.4*   GLC  --  73 79 81 69*   < > 60*   < > 74    < > = values in this interval not displayed.     Most Recent 2 LFT's:  Recent Labs   Lab Test 04/09/24  0636 04/04/24  0541   AST 68* 301*   ALT 53* 326*   ALKPHOS 155* 192*   BILITOTAL 0.9 0.6     Most Recent 3 INR's:  Recent Labs   Lab Test 04/11/24  0643 10/07/23  0516 10/06/23  0551   INR 1.31* 1.38* 1.08     Most Recent 3 Troponin's:  Recent Labs   Lab Test 06/10/20  1243 02/16/20  1824 09/18/19  0739   TROPI <0.015 <0.015 <0.015     Most Recent 3 BNP's:No lab results found.  Most Recent D-dimer:  Recent Labs   Lab Test 08/13/21  0934   DD 10.38*     Most Recent Cholesterol Panel:  Recent Labs   Lab Test 10/03/23  0941   CHOL 137   LDL 69   HDL 54   TRIG 68     Most Recent 6 Bacteria Isolates From Any Culture (See EPIC Reports for Culture Details):No lab results found.  Most Recent TSH and T4:  Recent Labs   Lab Test 04/10/24  0014 02/04/19  0934 03/28/17  0922   TSH 2.53   < > 1.37   T4  --   --  1.19    < > = values in this interval not displayed.     Most Recent Urinalysis:  Recent Labs   Lab Test 02/04/19  0935   COLOR Yellow   APPEARANCE Clear   URINEGLC Negative   URINEBILI Negative   URINEKETONE Negative   SG 1.010   UBLD Negative   URINEPH 6.0   PROTEIN Negative   UROBILINOGEN 0.2   NITRITE Negative   LEUKEST Negative   RBCU O - 2   WBCU 0 - 5     Most Recent ESR & CRP:  Recent Labs   Lab Test 11/13/23  1705   SED 39*

## 2024-04-14 NOTE — PROGRESS NOTES
VASCULAR SURGERY    Had a good day yesterday.  Stools are less.  Tolerating regular diet.  Taking nutritional supplements.    Right AKA dressing changed by nursing staff=A    K= 4.5   magnesium= 1.7  WBC= 7.9   Hgb= 9.2    Clinically slowly improving.  Try to get up in chair.      Chadwick Lozano MD

## 2024-04-14 NOTE — PROGRESS NOTES
CALORIE COUNT    Approximate Oral Intake for 4/13:      Calories: 0 kcal  Protein: 0g      Estimated Needs:    Dosing wt: 48 kg (3/29)   Calories: 9527-3020 (25-30 kcal/kg)  Protein: 55-75 grams (1.2-1.5 g/kg)      Summary:    Diet: Gluten Free Diet + Fluid restriction 1200 ML FLUID    Supplements:   - Ensure Enlive; With Meals (breakfast)  - Gelatein Plus; Between Meals (2 pm)  - Ensure Max Protein (bariatric); With Meals (dinner)  + Rigo for surgical wound healing (BID)     - PO intake yesterday = met 0% estimated energy needs, 0% estimated protein needs    - Goal to remain off TF = meet average of >60% of estimated nutrition needs via PO intake     - Calorie count to continue through 4/15    Vangie Dukes RD, LD  Clinical Dietitian - [facility]

## 2024-04-14 NOTE — PLAN OF CARE
Goal Outcome Evaluation:  Surgery/POD#: POD#13 R AKA, POD #4 R AKA closure  Orientation: A/O  ABNL VS/O2: VSS on 1L O2, trying to wean as not on O2 at home  ABNL Labs: K+/Mag/Phos replacement protocols, recheck in AM  Pain Management: PO Dilaudid given x1 this shift.  Bowel/Bladder: on hemodialysis but did urinate today, incontinent of bowel at times, 2 episodes watery diarrhea today, PRN lomotil  given x1.  Drains: RUE PICC SL. Dialysis port on R chest.  Wounds/incisions: R AKA dressing CDI, changed today, bilateral groin sites. Excoriated labia, perineum, miconazole ordered and barrier cream applied.  Diet: Gluten free w/ 1200 ml fluid restriction  Activity Level: A2/lift, up to chair this shift. Often refuses positioning. Weight shifting encouraged.  Anticipated  DC Date: pending ARU     Significant Information: NPO at midnight for swap of dialysis catheter in IR tomorrow before dialysis  Recent celiac diagnosis, will need reinforcement/continued teaching on this.

## 2024-04-15 ENCOUNTER — APPOINTMENT (OUTPATIENT)
Dept: INTERVENTIONAL RADIOLOGY/VASCULAR | Facility: CLINIC | Age: 64
DRG: 270 | End: 2024-04-15
Attending: INTERNAL MEDICINE
Payer: COMMERCIAL

## 2024-04-15 LAB
ALBUMIN SERPL BCG-MCNC: 1.6 G/DL (ref 3.5–5.2)
ANION GAP SERPL CALCULATED.3IONS-SCNC: 10 MMOL/L (ref 7–15)
BLD PROD TYP BPU: NORMAL
BLOOD COMPONENT TYPE: NORMAL
BUN SERPL-MCNC: 81.6 MG/DL (ref 8–23)
CALCIUM SERPL-MCNC: 7.5 MG/DL (ref 8.8–10.2)
CHLORIDE SERPL-SCNC: 90 MMOL/L (ref 98–107)
CODING SYSTEM: NORMAL
CREAT SERPL-MCNC: 3.5 MG/DL (ref 0.51–0.95)
CROSSMATCH: NORMAL
DEPRECATED HCO3 PLAS-SCNC: 23 MMOL/L (ref 22–29)
EGFRCR SERPLBLD CKD-EPI 2021: 14 ML/MIN/1.73M2
ERYTHROCYTE [DISTWIDTH] IN BLOOD BY AUTOMATED COUNT: 15.7 % (ref 10–15)
GLUCOSE BLDC GLUCOMTR-MCNC: 82 MG/DL (ref 70–99)
GLUCOSE BLDC GLUCOMTR-MCNC: 85 MG/DL (ref 70–99)
GLUCOSE BLDC GLUCOMTR-MCNC: 87 MG/DL (ref 70–99)
GLUCOSE BLDC GLUCOMTR-MCNC: 92 MG/DL (ref 70–99)
GLUCOSE SERPL-MCNC: 84 MG/DL (ref 70–99)
HCT VFR BLD AUTO: 26.4 % (ref 35–47)
HGB BLD-MCNC: 8.9 G/DL (ref 11.7–15.7)
ISSUE DATE AND TIME: NORMAL
MAGNESIUM SERPL-MCNC: 1.7 MG/DL (ref 1.7–2.3)
MCH RBC QN AUTO: 29.9 PG (ref 26.5–33)
MCHC RBC AUTO-ENTMCNC: 33.7 G/DL (ref 31.5–36.5)
MCV RBC AUTO: 89 FL (ref 78–100)
PATH REPORT.COMMENTS IMP SPEC: NORMAL
PATH REPORT.COMMENTS IMP SPEC: NORMAL
PATH REPORT.FINAL DX SPEC: NORMAL
PATH REPORT.MICROSCOPIC SPEC OTHER STN: NORMAL
PATH REPORT.MICROSCOPIC SPEC OTHER STN: NORMAL
PATH REPORT.RELEVANT HX SPEC: NORMAL
PHOSPHATE SERPL-MCNC: 4.9 MG/DL (ref 2.5–4.5)
PLATELET # BLD AUTO: 202 10E3/UL (ref 150–450)
POTASSIUM SERPL-SCNC: 4.9 MMOL/L (ref 3.4–5.3)
RBC # BLD AUTO: 2.98 10E6/UL (ref 3.8–5.2)
SODIUM SERPL-SCNC: 123 MMOL/L (ref 135–145)
UNIT ABO/RH: NORMAL
UNIT NUMBER: NORMAL
UNIT STATUS: NORMAL
UNIT TYPE ISBT: 6200
WBC # BLD AUTO: 8 10E3/UL (ref 4–11)

## 2024-04-15 PROCEDURE — 999N000157 HC STATISTIC RCP TIME EA 10 MIN

## 2024-04-15 PROCEDURE — 250N000011 HC RX IP 250 OP 636: Performed by: INTERNAL MEDICINE

## 2024-04-15 PROCEDURE — 90935 HEMODIALYSIS ONE EVALUATION: CPT

## 2024-04-15 PROCEDURE — 85027 COMPLETE CBC AUTOMATED: CPT | Performed by: INTERNAL MEDICINE

## 2024-04-15 PROCEDURE — 250N000011 HC RX IP 250 OP 636: Performed by: NURSE PRACTITIONER

## 2024-04-15 PROCEDURE — C1769 GUIDE WIRE: HCPCS

## 2024-04-15 PROCEDURE — 250N000013 HC RX MED GY IP 250 OP 250 PS 637: Performed by: STUDENT IN AN ORGANIZED HEALTH CARE EDUCATION/TRAINING PROGRAM

## 2024-04-15 PROCEDURE — C1750 CATH, HEMODIALYSIS,LONG-TERM: HCPCS

## 2024-04-15 PROCEDURE — 94640 AIRWAY INHALATION TREATMENT: CPT

## 2024-04-15 PROCEDURE — 85060 BLOOD SMEAR INTERPRETATION: CPT | Performed by: PATHOLOGY

## 2024-04-15 PROCEDURE — P9047 ALBUMIN (HUMAN), 25%, 50ML: HCPCS | Performed by: INTERNAL MEDICINE

## 2024-04-15 PROCEDURE — 99152 MOD SED SAME PHYS/QHP 5/>YRS: CPT

## 2024-04-15 PROCEDURE — 250N000009 HC RX 250: Performed by: NURSE PRACTITIONER

## 2024-04-15 PROCEDURE — 272N000302 HC DEVICE INFLATION CR5

## 2024-04-15 PROCEDURE — 99232 SBSQ HOSP IP/OBS MODERATE 35: CPT | Performed by: INTERNAL MEDICINE

## 2024-04-15 PROCEDURE — 250N000009 HC RX 250: Performed by: STUDENT IN AN ORGANIZED HEALTH CARE EDUCATION/TRAINING PROGRAM

## 2024-04-15 PROCEDURE — 258N000003 HC RX IP 258 OP 636: Performed by: INTERNAL MEDICINE

## 2024-04-15 PROCEDURE — 634N000001 HC RX 634: Mod: JZ | Performed by: INTERNAL MEDICINE

## 2024-04-15 PROCEDURE — 82040 ASSAY OF SERUM ALBUMIN: CPT | Performed by: INTERNAL MEDICINE

## 2024-04-15 PROCEDURE — 120N000001 HC R&B MED SURG/OB

## 2024-04-15 PROCEDURE — 99232 SBSQ HOSP IP/OBS MODERATE 35: CPT | Performed by: STUDENT IN AN ORGANIZED HEALTH CARE EDUCATION/TRAINING PROGRAM

## 2024-04-15 PROCEDURE — 94640 AIRWAY INHALATION TREATMENT: CPT | Mod: 76

## 2024-04-15 PROCEDURE — 250N000011 HC RX IP 250 OP 636: Performed by: RADIOLOGY

## 2024-04-15 PROCEDURE — 0J2TXYZ CHANGE OTHER DEVICE IN TRUNK SUBCUTANEOUS TISSUE AND FASCIA, EXTERNAL APPROACH: ICD-10-PCS | Performed by: RADIOLOGY

## 2024-04-15 PROCEDURE — 83735 ASSAY OF MAGNESIUM: CPT | Performed by: STUDENT IN AN ORGANIZED HEALTH CARE EDUCATION/TRAINING PROGRAM

## 2024-04-15 RX ORDER — NALOXONE HYDROCHLORIDE 0.4 MG/ML
0.2 INJECTION, SOLUTION INTRAMUSCULAR; INTRAVENOUS; SUBCUTANEOUS
Status: DISCONTINUED | OUTPATIENT
Start: 2024-04-15 | End: 2024-04-15

## 2024-04-15 RX ORDER — FAMOTIDINE 20 MG/1
20 TABLET, FILM COATED ORAL EVERY OTHER DAY
Status: DISCONTINUED | OUTPATIENT
Start: 2024-04-16 | End: 2024-04-16

## 2024-04-15 RX ORDER — FLUMAZENIL 0.1 MG/ML
0.2 INJECTION, SOLUTION INTRAVENOUS
Status: DISCONTINUED | OUTPATIENT
Start: 2024-04-15 | End: 2024-04-15

## 2024-04-15 RX ORDER — NALOXONE HYDROCHLORIDE 0.4 MG/ML
0.4 INJECTION, SOLUTION INTRAMUSCULAR; INTRAVENOUS; SUBCUTANEOUS
Status: DISCONTINUED | OUTPATIENT
Start: 2024-04-15 | End: 2024-04-15

## 2024-04-15 RX ORDER — ALBUMIN (HUMAN) 12.5 G/50ML
50 SOLUTION INTRAVENOUS
Status: DISCONTINUED | OUTPATIENT
Start: 2024-04-15 | End: 2024-04-15

## 2024-04-15 RX ORDER — CLINDAMYCIN PHOSPHATE 900 MG/50ML
900 INJECTION, SOLUTION INTRAVENOUS
Status: COMPLETED | OUTPATIENT
Start: 2024-04-15 | End: 2024-04-15

## 2024-04-15 RX ORDER — FENTANYL CITRATE 50 UG/ML
25-50 INJECTION, SOLUTION INTRAMUSCULAR; INTRAVENOUS EVERY 5 MIN PRN
Status: DISCONTINUED | OUTPATIENT
Start: 2024-04-15 | End: 2024-04-15

## 2024-04-15 RX ORDER — HEPARIN SODIUM 1000 [USP'U]/ML
1000-10000 INJECTION, SOLUTION INTRAVENOUS; SUBCUTANEOUS ONCE
Status: COMPLETED | OUTPATIENT
Start: 2024-04-15 | End: 2024-04-15

## 2024-04-15 RX ADMIN — ALBUTEROL SULFATE 2.5 MG: 2.5 SOLUTION RESPIRATORY (INHALATION) at 01:59

## 2024-04-15 RX ADMIN — CLOPIDOGREL BISULFATE 75 MG: 75 TABLET ORAL at 11:12

## 2024-04-15 RX ADMIN — BETAMETHASONE DIPROPIONATE: 0.5 CREAM TOPICAL at 20:46

## 2024-04-15 RX ADMIN — HEPARIN SODIUM 4000 UNITS: 1000 INJECTION INTRAVENOUS; SUBCUTANEOUS at 10:43

## 2024-04-15 RX ADMIN — SODIUM CHLORIDE 300 ML: 9 INJECTION, SOLUTION INTRAVENOUS at 13:13

## 2024-04-15 RX ADMIN — HYDROMORPHONE HYDROCHLORIDE 4 MG: 2 TABLET ORAL at 02:10

## 2024-04-15 RX ADMIN — Medication: at 13:14

## 2024-04-15 RX ADMIN — CLINDAMYCIN PHOSPHATE 900 MG: 900 INJECTION, SOLUTION INTRAVENOUS at 10:39

## 2024-04-15 RX ADMIN — ACETAMINOPHEN 650 MG: 325 TABLET, FILM COATED ORAL at 02:10

## 2024-04-15 RX ADMIN — NICOTINE 1 PATCH: 14 PATCH, EXTENDED RELEASE TRANSDERMAL at 09:01

## 2024-04-15 RX ADMIN — FENTANYL CITRATE 50 MCG: 50 INJECTION, SOLUTION INTRAMUSCULAR; INTRAVENOUS at 10:27

## 2024-04-15 RX ADMIN — SODIUM CHLORIDE 250 ML: 9 INJECTION, SOLUTION INTRAVENOUS at 13:13

## 2024-04-15 RX ADMIN — CARVEDILOL 6.25 MG: 6.25 TABLET, FILM COATED ORAL at 17:57

## 2024-04-15 RX ADMIN — LIDOCAINE HYDROCHLORIDE 17 ML: 10 INJECTION, SOLUTION INFILTRATION; PERINEURAL at 10:45

## 2024-04-15 RX ADMIN — ALBUMIN HUMAN 50 ML: 0.25 SOLUTION INTRAVENOUS at 14:15

## 2024-04-15 RX ADMIN — MICONAZOLE NITRATE: 2 POWDER TOPICAL at 09:02

## 2024-04-15 RX ADMIN — MIDAZOLAM 1 MG: 1 INJECTION INTRAMUSCULAR; INTRAVENOUS at 10:27

## 2024-04-15 RX ADMIN — EPOETIN ALFA-EPBX 10000 UNITS: 10000 INJECTION, SOLUTION INTRAVENOUS; SUBCUTANEOUS at 14:21

## 2024-04-15 RX ADMIN — HEPARIN SODIUM 1600 UNITS: 1000 INJECTION INTRAVENOUS; SUBCUTANEOUS at 14:26

## 2024-04-15 RX ADMIN — HEPARIN SODIUM 1600 UNITS: 1000 INJECTION INTRAVENOUS; SUBCUTANEOUS at 14:25

## 2024-04-15 RX ADMIN — HYDROMORPHONE HYDROCHLORIDE 4 MG: 2 TABLET ORAL at 20:46

## 2024-04-15 RX ADMIN — BETAMETHASONE DIPROPIONATE: 0.5 CREAM TOPICAL at 09:02

## 2024-04-15 RX ADMIN — Medication 1 CAPSULE: at 20:46

## 2024-04-15 RX ADMIN — ACETAMINOPHEN 650 MG: 325 TABLET, FILM COATED ORAL at 20:46

## 2024-04-15 RX ADMIN — ALBUTEROL SULFATE 2.5 MG: 2.5 SOLUTION RESPIRATORY (INHALATION) at 07:59

## 2024-04-15 RX ADMIN — MICONAZOLE NITRATE: 2 POWDER TOPICAL at 20:46

## 2024-04-15 RX ADMIN — Medication 1 CAPSULE: at 08:58

## 2024-04-15 RX ADMIN — HYDROMORPHONE HYDROCHLORIDE 4 MG: 2 TABLET ORAL at 15:56

## 2024-04-15 RX ADMIN — Medication 1 CAPSULE: at 08:57

## 2024-04-15 RX ADMIN — ALBUMIN HUMAN 50 ML: 0.25 SOLUTION INTRAVENOUS at 13:07

## 2024-04-15 RX ADMIN — ACETAMINOPHEN 650 MG: 325 TABLET, FILM COATED ORAL at 15:56

## 2024-04-15 ASSESSMENT — ACTIVITIES OF DAILY LIVING (ADL)
ADLS_ACUITY_SCORE: 41
ADLS_ACUITY_SCORE: 39
ADLS_ACUITY_SCORE: 41
ADLS_ACUITY_SCORE: 39
ADLS_ACUITY_SCORE: 41

## 2024-04-15 NOTE — PRE-PROCEDURE
GENERAL PRE-PROCEDURE:   Procedure:  Right tunneled dialysis catheter exchange with moderate sedation  Date/Time:  4/15/2024 8:10 AM    Written consent obtained?: Yes    Risks and benefits: Risks, benefits and alternatives were discussed    Consent given by:  Patient  Patient states understanding of procedure being performed: Yes    Patient's understanding of procedure matches consent: Yes    Procedure consent matches procedure scheduled: Yes    Expected level of sedation:  Moderate  Appropriately NPO:  Yes  ASA Class:  3  Mallampati  :  Grade 3- soft palate visible, posterior pharyngeal wall not visible  Lungs:  Lungs clear with good breath sounds bilaterally  Heart:  Other (comment) and systolic murmur  Heart exam comment:  Irregular, controlled rate  History & Physical reviewed:  History and physical reviewed and no updates needed  Statement of review:  I have reviewed the lab findings, diagnostic data, medications, and the plan for sedation

## 2024-04-15 NOTE — IR NOTE
Interventional Radiology Intra-procedural Nursing Note    Patient Name: Shirley Hendricks  Medical Record Number: [id number]  Today's Date: April 15, 2024    Procedure: tunneled CVC revision with moderate sedation  Start time: 1023  End time: 1045  Report provided to: michael ANN  Patient depart time and location: 1050 to 4102    Note: Patient entered Interventional Radiology Suite number 3 via cart. Patient awake, alert and oriented. Assisted onto procedural table in supine position. Prepped and draped.  Dr. Vogel in room. Time out and procedure started. Vital signs stable. Telemetry reading sinus rhythm.    Procedure well tolerated by patient without complications. Procedure end with debrief by Dr. Vogel.    Administered medication totals:  Lidocaine 1% 17 mL Intradermal  Zofran 4 mg IVP  Clindamycin 900 mg IVP  Heparin 4000 Units hep lock CVC  Versed 1 mg IVP  Fentanyl 50 mcg IVP    Last dose of sedation administered at 1027.

## 2024-04-15 NOTE — PLAN OF CARE
Surgery/POD#: POD#14 s/p R AKA  Orientation: A&Ox4, forgetful at times  ABNL VS/O2: VSS on 1L O2  ABNL Labs: Hgb 9.2, K+/Mag/Phos replacement protocols, recheck in AM.  Pain Management: PRN PO Dilaudid & Tylenol  Bowel/Bladder: on hemodialysis, small loose BM x2  Drains: PICC SL. Dialysis port.  Wounds/incisions: R AKA dressing CDI, bilateral groin sites. Blanchable redness to coccyx-Mepilex CDI. Excoriated labia/ perineum.  Diet: Gluten free w/ 1200 ml fluid restriction. NPO since midnight.  Activity Level: Ax2 w/ lift. Weight shifting encouraged.  Tests/Procedures: IR to replace HD catheter today  Anticipated  DC Date: pending ARU   Significant Information: Recent celiac diagnosis, will need reinforcement/continued teaching on this.

## 2024-04-15 NOTE — PLAN OF CARE
Goal Outcome Evaluation:    Surgery/POD#: POD#14 s/p R AKA  Orientation: A&Ox4, forgetful at times  ABNL VS/O2: VSS on 1L oxygen at times  ABNL Labs: Hgb 9.2, K+/Mag/Phos replacement protocols, all WNL & AM rechecks ordered.  Pain Management: PRN PO Dilaudid & Tylenol  Bowel/Bladder: On hemodialysis, loose BM x1   Drains: R PICC SL w/ good BR. Dialysis port.  Wounds/incisions: R AKA dressing CDI, bilateral groin sites RIN. Blanchable redness to coccyx-Mepilex CDI. Excoriated labia/ perineum, miconazole powder applied.  Diet: Gluten free w/ 1200 ml fluid restriction.   Activity Level: Ax2 w/ lift, T/R q2 hours, patient refuses at times despite education, MD aware. Refuses to elevate extremities.   Tests/Procedures: HD cath replaced by IR today. Dialysis today.  Anticipated  DC Date: Pending  Significant Information: Recent celiac diagnosis, will need reinforcement/continued teaching on this. Dressing change to be completed today.

## 2024-04-15 NOTE — PROGRESS NOTES
Care Management Follow Up    Length of Stay (days): 17    Expected Discharge Date: 04/15/2024     Concerns to be Addressed:       Patient plan of care discussed at interdisciplinary rounds: Yes    Anticipated Discharge Disposition: Transitional Care, Skilled Nursing Facility     Anticipated Discharge Services:    Anticipated Discharge DME:      Patient/family educated on Medicare website which has current facility and service quality ratings:    Education Provided on the Discharge Plan:    Patient/Family in Agreement with the Plan: yes    Referrals Placed by CM/SW:    Private pay costs discussed: Not applicable    Additional Information:  Writer received a call from family wanting an update regarding discharge plans. Writer unsure if patient accepted into ARU, SW following, family, Silvia would like a call from providers with updates.     DAWN Herrera

## 2024-04-15 NOTE — IR NOTE
IR CVC TUNNEL REVISION RIGHT , DISRUPTION OF FIBRIN SHEATH UTILIZING ANGIOPLASTY BALLOON 4/15/2024 10:44 AM     HISTORY: Malfunctioning/thrombosed tunneled central venous catheter.    COMPARISON: None.    DESCRIPTION OF PROCEDURE: After obtaining informed consent, the patient was placed in a supine position on the fluoroscopy table. The neck, chest, and external portions of a tunneled central venous catheter were prepped and draped in the usual sterile manner. 1% lidocaine was injected for local anesthesia. A stiff Glidewire was passed through one of the lumens of the tunneled catheter and maneuvered into the inferior vena cava. The catheter was then pulled and removed over the wire. A new tunneled central venous catheter was then passed over the wire, through the internal jugular vein and into the right atrium. The tip of the catheter was positioned in the mid right atrium. The ports were aspirated. There was difficulty aspirating from one of the ports. It was felt that this was likely due to a residual fibrin sheath. Therefore, the catheter was removed over wire. An 8 mm balloon was then passed over the wire and used to perform angioplasty from the right atrium into the SVC to disrupt possible fibrin sheath. Subsequently, the catheter was then replaced over the wire and the tip was positioned in the mid to lower right atrium. Both ports were able to be aspirated successfully this time. The ports were then flushed with saline, and heparin locked. A fluoroscopic image was obtained to document catheter tip position. The catheter was sutured to the patient's skin using 2-0 Ethilon.    I determined this patient to be an appropriate candidate for the planned sedation and procedure and reassessed the patient immediately prior to sedation and procedure. Moderate intravenous conscious sedation was supervised by me. The patient was independently monitored by a registered nurse assigned to the Department of radiology using  automated blood pressure, EKG and pulse oximetry. The patient tolerated the procedure well. There were no immediate postprocedure complications. The patient's vital signs were monitored by radiology nursing staff under my supervision and remained stable throughout the study. Radiation dose for this scan was reduced using automated exposure control, adjustment of the mA and/or kV according to patient size, or iterative reconstruction technique.    MEDICATIONS: 1 mg Versed, 50 mg fentanyl    SEDATION TIME: 22 minutes    FLUOROSCOPY TIME: 1.2 minutes    FLUOROSCOPIC DOSE: 6.99 mGy Air Kerma    NUMBER OF FLUOROSCOPIC IMAGES: 6    IMPRESSION: Tunneled central venous catheter placed as above.  Maximal Sterile Barrier Technique Utilized: Cap AND mask AND sterile gown AND sterile gloves AND sterile full body drape AND hand hygiene AND skin preparation 2% chlorhexidine for cutaneous antisepsis (or acceptable alternative antiseptics).     Sterile Ultrasound Technique Utilized -Sterile gel AND sterile probe covers.

## 2024-04-15 NOTE — PROGRESS NOTES
Potassium   Date Value Ref Range Status   04/15/2024 4.9 3.4 - 5.3 mmol/L Final   12/29/2022 4.3 3.4 - 5.3 mmol/L Final   07/09/2021 5.2 3.4 - 5.3 mmol/L Final     Hemoglobin   Date Value Ref Range Status   04/15/2024 8.9 (L) 11.7 - 15.7 g/dL Final   07/09/2021 8.8 (L) 11.7 - 15.7 g/dL Final     Creatinine   Date Value Ref Range Status   04/15/2024 3.50 (H) 0.51 - 0.95 mg/dL Final   07/09/2021 0.77 0.52 - 1.04 mg/dL Final     Urea Nitrogen   Date Value Ref Range Status   04/15/2024 81.6 (H) 8.0 - 23.0 mg/dL Final   12/29/2022 17 7 - 30 mg/dL Final   07/09/2021 13 7 - 30 mg/dL Final     Sodium   Date Value Ref Range Status   04/15/2024 123 (L) 135 - 145 mmol/L Final     Comment:     Reference intervals for this test were updated on 09/26/2023 to more accurately reflect our healthy population. There may be differences in the flagging of prior results with similar values performed with this method. Interpretation of those prior results can be made in the context of the updated reference intervals.    07/09/2021 132 (L) 133 - 144 mmol/L Final     INR   Date Value Ref Range Status   04/11/2024 1.31 (H) 0.85 - 1.15 Final   09/24/2020 1.01 0.86 - 1.14 Final       DIALYSIS PROCEDURE NOTE  Hepatitis status of previous patient on machine log was checked and verified ok to use with this patients hepatitis status.  Patient dialyzed for 3 hrs. on a K2 bath with a net fluid removal of  3L.  A BFR of 250-400 ml/min was obtained via a right CVC.      The treatment plan was discussed with Dr. Alegria during the treatment.    Total heparin received during the treatment: 0 units.     Line flushed, clamped and capped with heparin 1:1000 1.6 mL (1600 units) per lumen    Meds  given: epo 10,000,  2  albumin 25%, 50 mL  Complications: An hour and a half into treatment SBP dropped to 77, patient asymptomatic,  MD notified, BFR lowered to 250 per MD order, UF turned off, MD verbalized order to maintain BP above 75, albumin bolus given, UF  turned on after 5 minutes and remained on during remainder of treatment. SBP increased to 86 after 15 minutes.     Person educated: Patient. Knowledge base substantial. Barriers to learning: none. Educated on procedure via verbal mode. The patient verbalized understanding.   ICEBOAT? Timeout performed pre-treatment  I: Patient was identified using 2 identifiers  C:  Consent Signed Yes  E: Equipment preventative maintenance is current and dialysis delivery system OK to use  B: Hepatitis B Surface Antigen: Negative; Draw Date: 4/3/24      Hepatitis B Surface Antibody: Susceptible; Draw Date: 4/3/24  O: Dialysis orders present and complete prior to treatment  A: Vascular access verified and assessed prior to treatment  T: Treatment was performed at a clinically appropriate time  ?: Patient was allowed to ask questions and address concerns prior to treatment  See Adult Hemodialysis flowsheet in [facility] for further details and post assessment.  Machine water alarm in place and functioning. Transducer pods intact and checked every 15min.   Pt assisted with repositioning throughout dialysis treatment.  Pt returned via bed.  Chlorine/Chloramine water system checked every 4 hours.  Outpatient Dialysis at D      Post treatment report given to BAHMAN Perez RN regarding 3L of fluid removed, last BP

## 2024-04-15 NOTE — PROGRESS NOTES
Renal Medicine Progress Note            Assessment/Plan:     # Patient with a creatinine of 0.45-0.9 mg/dl at baseline.      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               -dialysis dependent     # Ischemic RLE due to occluded bypass graft   -AKA due to unsalvagable limb 04/01/24     # FEN: 3+ pitting edema. Hyponatremia hypervolemia.      # Anemia:                -EPO with HD     Plan:  # 3 hrs HD. UF ~ 3 liters net as tolerates. Na 140. Qb 350-400.   # Plan for HD again tomorrow for more UF.   #  EPO 10K   # Hold Norvasc if SBP is <120          Interval History:     New TDC is working well.   3+ pitting edema bilateral.   BP is on the low side  No new complaints from patient.          Medications and Allergies:     Current Facility-Administered Medications   Medication Dose Route Frequency Provider Last Rate Last Admin    - MEDICATION INSTRUCTIONS for Dialysis Patients -   Does not apply See Admin Instructions Marcin White MD        albuterol (PROVENTIL) neb solution 2.5 mg  2.5 mg Nebulization Q6H Marcin White MD   2.5 mg at 04/15/24 0759    amLODIPine (NORVASC) tablet 5 mg  5 mg Oral Daily Marcin White MD   5 mg at 04/14/24 0849    betamethasone dipropionate (DIPROSONE) 0.05 % cream   Topical BID Marcin White MD   Given at 04/15/24 0902    carvedilol (COREG) tablet 6.25 mg  6.25 mg Oral BID w/meals Marcin White MD   6.25 mg at 04/14/24 1812    clopidogrel (PLAVIX) tablet 75 mg  75 mg Oral Daily Gala Morales DO   75 mg at 04/15/24 1112    Contraindications to both pharmacological and mechanical prophylaxis (must document contraindications for both in this order)   Does not apply See Admin Instructions Marcin White MD        epoetin mireya-epbx (RETACRIT) injection 10,000 Units  10,000 Units Intravenous Once in dialysis/CRRT Hardy Falcon MD        [START ON 4/16/2024] famotidine (PEPCID) tablet 20 mg  20 mg Oral Every Other Day Hardy Falcon MD         "fluticasone-vilanterol (BREO ELLIPTA) 200-25 MCG/ACT inhaler 1 puff  1 puff Inhalation Daily Marcin White MD   1 puff at 04/14/24 0855    heparin 1000 unit/mL DIALYSIS Cath LOCK - RED Lumen  1.3-2.6 mL Intracatheter Once in dialysis/CRRT Hardy Falcon MD        heparin 1000 unit/mL DIALYSIS Cath LOCK -BLUE Lumen  1.3-2.6 mL Intracatheter Once in dialysis/CRRT Hardy Falcon MD        Rigo PACK 1 packet  1 packet Oral BID 09 12 Mercel, Gala, DO   1 packet at 04/14/24 1410    lactobacillus rhamnosus (GG) (CULTURELL) capsule 1 capsule  1 capsule Oral BID MercelStephanieGala, DO   1 capsule at 04/15/24 0858    miconazole (MICATIN) 2 % powder   Topical BID Macrin White MD   Given at 04/15/24 0902    multivitamin RENAL (TRIPHROCAPS) capsule 1 capsule  1 capsule Oral Daily Gala Morales, DO   1 capsule at 04/15/24 0857    nicotine (NICODERM CQ) 14 MG/24HR 24 hr patch 1 patch  1 patch Transdermal Daily Marcin White MD   1 patch at 04/15/24 0901    sodium chloride (PF) 0.9% PF flush 10-40 mL  10-40 mL Intracatheter Q8H Sofia Cristobal MD   40 mL at 04/15/24 0542    sodium chloride (PF) 0.9% PF flush 9 mL  9 mL Intracatheter During Dialysis/CRRT (from stock) Hardy Falcon MD        sodium chloride (PF) 0.9% PF flush 9 mL  9 mL Intracatheter During Dialysis/CRRT (from stock) Hardy Falcon MD            Allergies   Allergen Reactions    Pantoprazole      Protonix caused diffuse edema    Chantix [Varenicline]      Terrible dreams    Contrast Dye Swelling     Patient reports facial and throat swelling with prior CT contrast.    Gluten Meal GI Disturbance     Pt has celiac disease    Penicillins Itching and Rash            Physical Exam:   Vitals were reviewed   , Blood pressure (!) 75/51, pulse 58, temperature 97.8  F (36.6  C), temperature source Oral, resp. rate 12, height 1.549 m (5' 1\"), weight 58.2 kg (128 lb 4.9 oz), SpO2 93%, not currently breastfeeding.    Wt Readings from Last 3 Encounters:   04/15/24 " 58.2 kg (128 lb 4.9 oz)   01/08/24 49.8 kg (109 lb 12.8 oz)   12/12/23 50.8 kg (112 lb)       Intake/Output Summary (Last 24 hours) at 4/15/2024 1317  Last data filed at 4/14/2024 2220  Gross per 24 hour   Intake 400 ml   Output --   Net 400 ml     GENERAL APPEARANCE: Frail.   HEENT:  Eyes/ears/nose/neck grossly normal  RESP: lungs cta b c good efforts, no crackles, rhonchi or wheezes  CV: RRR  ABDOMEN: Soft, NT  EXTREMITIES/SKIN:R AKA.  3+ pitting edema.   NEURO: Drowsy from anesthesia  R TDC CDI         Data:     CBC RESULTS:     Recent Labs   Lab 04/15/24  0536 04/14/24  0542 04/13/24  0655 04/11/24  0643 04/09/24  1817 04/09/24  0636   WBC 8.0 7.9 6.0  6.0 5.0 3.9* 3.9*   RBC 2.98* 3.09* 2.29*  2.29* 2.87* 3.03* 2.37*   HGB 8.9* 9.2* 6.7*  6.7* 8.4* 9.0* 6.7*   HCT 26.4* 27.3* 20.2*  20.2* 24.6* 26.0* 20.0*    196 157  157 151 105* 48*       Basic Metabolic Panel:  Recent Labs   Lab 04/15/24  1146 04/15/24  0536 04/15/24  0149 04/14/24  2145 04/14/24  0538 04/14/24  0205 04/13/24  2226 04/13/24  1316 04/13/24  0655 04/12/24  0603 04/12/24  0553 04/11/24  0800 04/11/24  0643 04/10/24  2156 04/10/24  1835 04/10/24  1324 04/10/24  0705   NA  --  123*  --   --   --   --   --   --  130*  --  127*  --  128*  --  130*  --  124*   POTASSIUM  --  4.9  --   --  4.5  --   --   --  4.2  --  4.9  --  4.6  4.7  --  4.6  --  5.9*   CHLORIDE  --  90*  --   --   --   --   --   --  95*  --  93*  --  93*  --  94*  --  91*   CO2  --  23  --   --   --   --   --   --  25  --  21*  --  26  --  25  --  20*   BUN  --  81.6*  --   --   --   --   --   --  52.4*  --  76.4*  --  45.9*  --  34.8*  --  66.8*   CR  --  3.50*  --   --   --   --   --   --  2.62*  --  3.17*  --  2.56*  --  2.09*  --  3.20*   GLC 87 84 92 94  --  73 79   < > 69*   < > 60*   < > 74   < > 79   < > 90   SONIA  --  7.5*  --   --   --   --   --   --  7.2*  --  7.6*  --  7.4*  --  7.5*  --  7.7*    < > = values in this interval not displayed.        INR  Recent Labs   Lab 04/11/24  0643   INR 1.31*      Attestation:   I have reviewed today's relevant vital signs, notes, medications, labs and imaging.    Torrey Alegria MD  [facility] - Nephrology  Office phone :[phone]  Pager: [phone]

## 2024-04-15 NOTE — PROGRESS NOTES
Care Management Follow Up    Length of Stay (days): 17    Expected Discharge Date: 04/15/2024     Concerns to be Addressed:       Patient plan of care discussed at interdisciplinary rounds: Yes    Anticipated Discharge Disposition: Transitional Care, Skilled Nursing Facility     Anticipated Discharge Services:    Anticipated Discharge DME:      Patient/family educated on Medicare website which has current facility and service quality ratings:    Education Provided on the Discharge Plan:    Patient/Family in Agreement with the Plan: yes    Referrals Placed by CM/SW:    Private pay costs discussed: Not applicable    Additional Information:  Received call from Lupe at [facility]. Requesting CXR and Hep B CORE antibody. Refaxed the CXR today (had faxed on 4/12)  and left message again for nephrology about labs needed ( hep B CORE antibody). [facility] is unable to schedule chair time without these items.     [id number] writer spoke to Dr. Alegria and will order the Hep B Core Antibiody    Leonor Mcguire, RN   [facility]   Phone [phone], [employer] or [phone]

## 2024-04-15 NOTE — PROGRESS NOTES
"VASCULAR SURGERY PROGRESS NOTE    Subjective:  No acute events. Diarrhea resolved with fiber supplements and lomotil. Feels like she has more range of motion to right AKA. IR to replace HD catheter today.    Objective:  Intake/Output Summary (Last 24 hours) at 4/9/2024 0738  Last data filed at 4/9/2024 0600  Gross per 24 hour   Intake 925 ml   Output 1800 ml   Net -875 ml     PHYSICAL EXAM:  BP (!) 141/61 (BP Location: Left arm)   Pulse 66   Temp 97.5  F (36.4  C) (Oral)   Resp 18   Ht 1.549 m (5' 1\")   Wt 58.2 kg (128 lb 4.9 oz)   LMP  (LMP Unknown)   SpO2 90%   BMI 24.24 kg/m    General: The patient is alert, awake, conversant, and oriented today.   Psych: pleasant affect  Skin: Color appropriate for race, warm, dry.  Respiratory: The patient does not require supplemental oxygen. Breathing unlabored  GI:  Abdomen soft, nontender to light palpation.  Extremities: 2+ CFA pulse bilaterally. Right AKA healing well, superficial bruising with areas of sloughing and ischemic tissue to right lateral thigh.       Labs:  Na 123  Albumin 1.6  Hgb 8.9  Platelets 202    Imaging:   No new imaging    ASSESSMENT:  Shirley Hendricks is a 63 year old female who underwent an emergent right AKA due to graft failure on 4/1, now requiring hemodialysis due to LIMA secondary to rhabdo now s/p right completion AKA on 4/9      PLAN:  -diet as tolerated after IR procedure today  -encourage protein supplements  -plavix for left bypass  -nursing for daily dressing changes to right AKA with xeroform and gauze with kerlix to keep wound protected  -continue fiber supplements and probiotics  -vascular to follow    Seen with Dr Blake Morales, DO  VASCULAR SURGERY     STAFF: Seen this morning on rounds.  Tolerating diet and supplements.  Diarrhea has resolved.  Did get up in the chair yesterday.  AKA is healing well.  Lateral skin necrosis will be followed.    Stable anemia with Hgb= 8.9.  No evidence of bleeding.    Continues on " hemodialysis.  Will get input from nephrology--??  With her renal functions will resolve.    Looking into placement facility.       Chadwick Lozano MD

## 2024-04-15 NOTE — PROGRESS NOTES
[facility]    Medicine Progress Note - Hospitalist Service    Date of Admission:  3/29/2024  Date of Service: 04/15/2024    Assessment & Plan     Shirley Hendricks is a 63 year old female admitted on 3/29/2024 due to occlusion of right LE bypass graft.      Past medical history significant for PAD/PVD, Tobacco use D/O, CAD (history of MI x3), HTN, HLP, DM2, Neuropathy, COPD, GERD, Anemia, Spongiotic dermatitis, MDD with anxiety, Chronic anticoagulation therapy with history of DVT/PE, OA, Charcot-Breonna-Tooth foot deformity, Marginal zone B-cell lymphoma, History of SBO, History of Takotsubo CM, History of SVT.    Discussed with Dr. Mazariegos and reviewed ED notes and Vascular Surgery consult note.  Patient presented to the ED due to right leg pain that had begun ~ 1 hour prior to presentation.  She reported pain seems to worsen with walking and when she attempted to check a pulse in her leg it was absent.  She also described that the foot is colder than the left.      While in the lobby attempts to find right pedal pulse with doppler were unsuccessful.  Dr. Lozano of Vascular Surgery was contacted by the patient as well as Dr. Mazariegos.      Work-up completed while patient was in the lobby included right arterial LE US revealed the right common femoral artery to mid anterior tibial artery bypass graft occlusion.      After assessing patient labs were collected and included a CMP that revealed a sodium of 132, chloride of 97, CO2 of 21 and glucose of 103 otherwise within normal limits.  CBC with differential revealed an RDW of 15.7 otherwise unremarkable.    Right common femoral artery to mid anterior tibial artery bypass graft acute occlusion  PAD/PVD  Acute Blood Loss Anemia  *Occurred despite low-dose Xarelto and Plavix therapy.    - Vascular surgery consult requested -> urgent guillotine right above knee amputation today followed by close monitoring in the ICU.    - Tentative plan to  return to OR today for formalization of AKA with Dr. Diaz   - IR was consulted and following  --Per Vascular surgery consult Dr. Salomon was contacted and s/p right LE angiogram and lytic therapy 03/30 -> 03/31 took the patient to the angio suite to remove the sheath in the left groin with closure device.   --IR now signed off  - Hold PTA Xarelto 15 mg nightly.    - Plavix daily for left bypass   - Defer resumption of PTA Plavix 75 mg/d to Vascular Surgery and/or IR -> plavix to restart once platelets improve, continue to monitor bypass in LLE   - Statin and antihypertensive management as below.    - Pain management as needed  - Follow Hgb daily  - transfused 1 U PRBCs 03/30 / 03/31 and 04/02 and 04/06 and 04/09 and 04/13 closely monitor.  - CBC daily, transfuse as needed Hgb < 7.0  - PT / OT eval -> TCU   - SW consulted  - Peripheral Smear -> -Posttransfusion sample showing moderate (previously marked) anemia with evidence of red cell regeneration. Myeloid left shift without evidence of dysplastic change or blasts, reactive etiology favored    Rhabdomyolysis   ARF  Acute Hyponatremia  Assessment: CPK has been rising. Her serum creatinine and body urea nitrogen also rising. Renal US -> No obstruction demonstrated.   Plan:  - Follow CK / BMP daily -> Cr stable  - Nephrology following -> now on HD, Decreased BP following dilaudid for pain and 25% albumin ordered with improvement in BPs first day of HD  - Hyponatremia mgtmt per renal service -> improving  - Avoid NSAIDs / nephrotoxins    Colon distention -> resolved  Abdominal Pain -> resolved  Assessment:CT abdomen 04/03 -> Medium-sized right retroperitoneal and extraperitoneal hematoma. Evaluation for extravasation is unable to be performed without contrast. This is likely similar to slightly smaller than the CT lumbar spine although this is incompletely visualized at   that time  Extensive pulmonary opacities. Differential: Multifocal infection, ARDS, and/or  pulmonary  Plan:  --NG placed -> remove in coming days as GI issues resolve -> now removed  --Can stop TFs -> Dialysis diet -> ADAT  --Hold Anticoagulation for now given anemia  --GI following ->  consider flex sig/colonoscopy as outpatient -> may need to consider here if keeps losing Hgb  --Repeat flat AXR 4/5: Nonobstructive bowel gas pattern. Gaseous distention of the colon. Enteric feeding tube tip in the fourth portion of the duodenum. Iliac stents, cholecystectomy clips, and endoscopic clips in the right hemiabdomen.   --Continue to monitor  --Pain control as needed  --C Diff negative    MDD with anxiety  Acute Delirium   *Noted on chart review.  No interventions.    Delirium from acute illness  Plan:  Delirium has mostly resolved  Continue to maintain delirium precautions.   IM Zyprexa as needed    Contrast dye allergy  - Ordered solumedrol and benadryl per contrast dye allergy protocol.      Mild hyponatremia  - BMP daily -> improving    Tobacco Use D/O   Patient currently smokes max 5 cigarettes per day.     - Counseled regarding smoking cessation that should also continue with PCP.    - Nicoderm patch ordered.    Recent celiac disease  *Patient reported recent GI work-up revealed she has celiac disease.    - Once diet can be advanced would request no gluten/celiac diet.      CAD (history of MI x3)  HTN  HLP  *Last coronary angiogram completed 10/2023.    - Resumed on PTA Coreg 6.25 mg BID, hold lisinopril 10 mg/d due to LIMA, continue Norvasc 2.5 mg/d.  Hold parameters in place.    - hold PTA rosuvastatin 40 mg/d due to acute rhabdo  - PRN IV hydralazine 10 mg every 4 hours for SBP GREATER THAN 180.      History of Takotsubo CM  *Recovered EF (55-60%) from ECHO 11/2023.    - Resumed on PTA Coreg 6.25 mg BID (hold for SBP < 110), hold lisinopril 10 mg/d and  hold Jardiance 10 mg/d For now given ARF    Type 2 DM  Diabetic neuropathy  *Noted diagnosis on chart review.  However, A1c of 5.2%.    - Hold PTA  Jardiance 10 mg/d and gabapentin 100 mg TID due to AMS.  - No insulin as borderline hypoglycemic    COPD   - Resumed on PTA inhalers.    - Encourage use of Flutter valve/IS.      GERD  - Resumed on PTA omeprazole 20 mg/d.      Anemia  Recent GI bleed (12/2023 and admitted at [facility])  - Hold PTA Iron supplements and resume at discharge.    - CBC daily      Spongiotic dermatitis  *Recently seen at outpatient Derm.    - Resumed on PTA betamethasone cream BID.      Chronic anticoagulation therapy with history of DVT/PE  - Hold PTA Xarelto.      OA  - Pain management as above.      Charcot-Breonna-Tooth foot deformity  *Noted on chart review.  No interventions.     Stage VALENTIN marginal zone B-cell lymphoma  *Follows with [facility] (Dr. Barrow).    - Continue outpatient surveillance (CT scan in 8/2024).      History of SBO  *Noted on chart review.  No interventions.    History of SVT  - Resumed on PTA Coreg as above.            Diet: Snacks/Supplements Adult: Ensure Max Protein (bariatric); With Meals  Snacks/Supplements Adult: Ensure Enlive; With Meals  Fluid restriction 1200 ML FLUID  Gluten Free Diet    DVT Prophylaxis: Pneumatic Compression Devices  Alvarez Catheter: Not present  Lines: PRESENT      PICC 03/31/24 Double Lumen Right Brachial vein medial access-Site Assessment: WDL  [REMOVED] CVC Double Lumen Right Internal jugular Tunneled-Site Assessment: WDL  CVC Double Lumen Right Internal jugular Tunneled-Site Assessment: WDL except      Cardiac Monitoring: None  Code Status: Full Code      Clinically Significant Risk Factors         # Hyponatremia: Lowest Na = 123 mmol/L in last 2 days, will monitor as appropriate      # Hypoalbuminemia: Lowest albumin = 1.6 g/dL at 4/15/2024  5:36 AM, will monitor as appropriate     # Hypertension: Noted on problem list         # Severe Malnutrition: based on nutrition assessment    # Financial/Environmental Concerns:           Disposition Plan      Expected Discharge Date:  04/15/2024        Discharge Comments: needs ARU/TCU bed w-Dialysis        Marcin White MD  Hospitalist Service  [facility]  Securely message with SwitchNote (more info)  Text page via [employer] Paging/Directory   ______________________________________________________________________    Interval History     No acute events overnight  Resting in bed comfortably  Pain controlled  Hgb responded well to transfusion   No bloody stools, no hematuria / no hematemesis  No fevers  No new CP/SOB   No nausea / vomiting  No new complaints otherwise  Calm and pleasant today    Physical Exam   Vital Signs: Temp: 98.3  F (36.8  C) Temp src: Oral BP: 119/58 Pulse: 70   Resp: 16 SpO2: 95 % O2 Device: None (Room air) Oxygen Delivery: 2 LPM  Weight: 128 lbs 4.92 oz      Constitutional: Awake, alert, no apparent distress.    Pulmonary: CTABL  Cardiovascular: Regular rate and rhythm, normal S1 and S2, no S3 or S4, and no murmur noted. 2+ pitting edema bilaterally.  GI: Normal bowel sounds, soft, non-distended, non-tender.    Neuro: Fahad but has sensory loss and is not able to wiggle toes but is able to plantarflex and dorsiflex.   Psych:  Alert and oriented x3. Fahad. Normal affect.  Extremities: Right AKA wound with dressing.  ----------------------------------------------------------------------------------------    Medical Decision Making       42 MINUTES SPENT BY ME on the date of service doing chart review, history, exam, documentation & further activities per the note.      Data   ------------------------- PAST 24 HR DATA REVIEWED -----------------------------------------------    I have personally reviewed the following data over the past 24 hrs:    8.0  \   8.9 (L)   / 202     123 (L) 90 (L) 81.6 (H) /  87   4.9 23 3.50 (H) \     ALT: N/A AST: N/A AP: N/A TBILI: N/A   ALB: 1.6 (L) TOT PROTEIN: N/A LIPASE: N/A       Imaging results reviewed over the past 24 hrs:   Recent Results (from the past 24 hour(s))   IR CVC  Tunnel Revision Right    Narrative    IR CVC TUNNEL REVISION RIGHT , DISRUPTION OF FIBRIN SHEATH UTILIZING  ANGIOPLASTY BALLOON 4/15/2024 10:44 AM     HISTORY: Malfunctioning/thrombosed tunneled central venous catheter.    COMPARISON: None.    DESCRIPTION OF PROCEDURE: After obtaining informed consent, the  patient was placed in a supine position on the fluoroscopy table. The  neck, chest, and external portions of a tunneled central venous  catheter were prepped and draped in the usual sterile manner. 1%  lidocaine was injected for local anesthesia. A stiff Glidewire was  passed through one of the lumens of the tunneled catheter and  maneuvered into the inferior vena cava. The catheter was then pulled  and removed over the wire. A new tunneled central venous catheter was  then passed over the wire, through the internal jugular vein and into  the right atrium. The tip of the catheter was positioned in the mid  right atrium. The ports were aspirated. There was difficulty  aspirating from one of the ports. It was felt that this was likely due  to a residual fibrin sheath. Therefore, the catheter was removed over  wire. An 8 mm balloon was then passed over the wire and used to  perform angioplasty from the right atrium into the SVC to disrupt  possible fibrin sheath. Subsequently, the catheter was then replaced  over the wire and the tip was positioned in the mid to lower right  atrium. Both ports were able to be aspirated successfully this time.  The ports were then flushed with saline, and heparin locked. A  fluoroscopic image was obtained to document catheter tip position. The  catheter was sutured to the patient's skin using 2-0 Ethilon.    I determined this patient to be an appropriate candidate for the  planned sedation and procedure and reassessed the patient immediately  prior to sedation and procedure. Moderate intravenous conscious  sedation was supervised by me. The patient was independently monitored  by a  registered nurse assigned to the Department of radiology using  automated blood pressure, EKG and pulse oximetry. The patient  tolerated the procedure well. There were no immediate postprocedure  complications. The patient's vital signs were monitored by radiology  nursing staff under my supervision and remained stable throughout the  study. Radiation dose for this scan was reduced using automated  exposure control, adjustment of the mA and/or kV according to patient  size, or iterative reconstruction technique.    MEDICATIONS: 1 mg Versed, 50 mg fentanyl    SEDATION TIME: 22 minutes    FLUOROSCOPY TIME: 1.2 minutes    FLUOROSCOPIC DOSE: 6.99 mGy Air Kerma    NUMBER OF FLUOROSCOPIC IMAGES: 6      Impression    IMPRESSION: Tunneled central venous catheter placed as above.  Maximal Sterile Barrier Technique Utilized: Cap AND mask AND sterile  gown AND sterile gloves AND sterile full body drape AND hand hygiene  AND skin preparation 2% chlorhexidine for cutaneous antisepsis (or  acceptable alternative antiseptics).     Sterile Ultrasound Technique Utilized ?Sterile gel AND sterile probe  covers.      ALBERTO BENITEZ MD         SYSTEM ID:  [id number]       ------------------------- ENCOUNTER LABS ----------------------------------------------------------------  Recent Labs   Lab 04/15/24  1146 04/15/24  0536 04/15/24  0149 04/14/24  2145 04/14/24  0542 04/14/24  0538 04/13/24  1316 04/13/24  0655 04/12/24  0603 04/12/24  0553 04/11/24  0800 04/11/24  0643 04/09/24  1212 04/09/24  0636   WBC  --  8.0  --   --  7.9  --   --  6.0  6.0  --   --   --  5.0   < > 3.9*   HGB  --  8.9*  --   --  9.2*  --   --  6.7*  6.7*  --   --   --  8.4*   < > 6.7*   MCV  --  89  --   --  88  --   --  88  88  --   --   --  86   < > 84   PLT  --  202  --   --  196  --   --  157  157  --   --   --  151   < > 48*   INR  --   --   --   --   --   --   --   --   --   --   --  1.31*  --   --    NA  --  123*  --   --   --   --   --  130*  --  127*   --  128*   < > 127*   POTASSIUM  --  4.9  --   --   --  4.5  --  4.2  --  4.9  --  4.6  4.7   < > 4.9   CHLORIDE  --  90*  --   --   --   --   --  95*  --  93*  --  93*   < > 94*   CO2  --  23  --   --   --   --   --  25  --  21*  --  26   < > 25   BUN  --  81.6*  --   --   --   --   --  52.4*  --  76.4*  --  45.9*   < > 48.3*   CR  --  3.50*  --   --   --   --   --  2.62*  --  3.17*  --  2.56*   < > 2.66*   ANIONGAP  --  10  --   --   --   --   --  10  --  13  --  9   < > 8   SONIA  --  7.5*  --   --   --   --   --  7.2*  --  7.6*  --  7.4*   < > 7.1*   GLC 87 84 92   < >  --   --    < > 69*   < > 60*   < > 74   < > 78   ALBUMIN  --  1.6*  --   --   --   --   --   --   --   --   --   --   --  1.8*   PROTTOTAL  --   --   --   --   --   --   --   --   --   --   --   --   --  3.9*   BILITOTAL  --   --   --   --   --   --   --   --   --   --   --   --   --  0.9   ALKPHOS  --   --   --   --   --   --   --   --   --   --   --   --   --  155*   ALT  --   --   --   --   --   --   --   --   --   --   --   --   --  53*   AST  --   --   --   --   --   --   --   --   --   --   --   --   --  68*    < > = values in this interval not displayed.       Most Recent 3 CBC's:  Recent Labs   Lab Test 04/15/24  0536 04/14/24  0542 04/13/24  0655   WBC 8.0 7.9 6.0  6.0   HGB 8.9* 9.2* 6.7*  6.7*   MCV 89 88 88  88    196 157  157     Most Recent 3 BMP's:  Recent Labs   Lab Test 04/15/24  1146 04/15/24  0536 04/15/24  0149 04/14/24  2145 04/14/24  0538 04/13/24  1316 04/13/24  0655 04/12/24  0603 04/12/24  0553   NA  --  123*  --   --   --   --  130*  --  127*   POTASSIUM  --  4.9  --   --  4.5  --  4.2  --  4.9   CHLORIDE  --  90*  --   --   --   --  95*  --  93*   CO2  --  23  --   --   --   --  25  --  21*   BUN  --  81.6*  --   --   --   --  52.4*  --  76.4*   CR  --  3.50*  --   --   --   --  2.62*  --  3.17*   ANIONGAP  --  10  --   --   --   --  10  --  13   SONIA  --  7.5*  --   --   --   --  7.2*  --  7.6*   GLC 87 84 92    < >  --    < > 69*   < > 60*    < > = values in this interval not displayed.     Most Recent 2 LFT's:  Recent Labs   Lab Test 04/09/24  0636 04/04/24  0541   AST 68* 301*   ALT 53* 326*   ALKPHOS 155* 192*   BILITOTAL 0.9 0.6     Most Recent 3 INR's:  Recent Labs   Lab Test 04/11/24  0643 10/07/23  0516 10/06/23  0551   INR 1.31* 1.38* 1.08     Most Recent 3 Troponin's:  Recent Labs   Lab Test 06/10/20  1243 02/16/20  1824 09/18/19  0739   TROPI <0.015 <0.015 <0.015     Most Recent 3 BNP's:No lab results found.  Most Recent D-dimer:  Recent Labs   Lab Test 08/13/21  0934   DD 10.38*     Most Recent Cholesterol Panel:  Recent Labs   Lab Test 10/03/23  0941   CHOL 137   LDL 69   HDL 54   TRIG 68     Most Recent 6 Bacteria Isolates From Any Culture (See EPIC Reports for Culture Details):No lab results found.  Most Recent TSH and T4:  Recent Labs   Lab Test 04/10/24  0014 02/04/19  0934 03/28/17  0922   TSH 2.53   < > 1.37   T4  --   --  1.19    < > = values in this interval not displayed.     Most Recent Urinalysis:  Recent Labs   Lab Test 02/04/19  0935   COLOR Yellow   APPEARANCE Clear   URINEGLC Negative   URINEBILI Negative   URINEKETONE Negative   SG 1.010   UBLD Negative   URINEPH 6.0   PROTEIN Negative   UROBILINOGEN 0.2   NITRITE Negative   LEUKEST Negative   RBCU O - 2   WBCU 0 - 5     Most Recent ESR & CRP:  Recent Labs   Lab Test 11/13/23  1705   SED 39*

## 2024-04-15 NOTE — CONSULTS
Patient is on IR schedule today Monday 4/15/24 for a Rignt tunneled dialysis catheter exchange with IV moderate sedation.     Shirley Hendricks is a 63 year old female admitted on 3/29/2024 due to occlusion of right LE bypass graft.       Past medical history significant for PAD/PVD, Tobacco use D/O, CAD (history of MI x3), HTN, HLP, DM2, Neuropathy, COPD, GERD, Anemia, Spongiotic dermatitis, MDD with anxiety, Chronic anticoagulation therapy with history of DVT/PE, OA, Charcot-Breonna-Tooth foot deformity, Marginal zone B-cell lymphoma, History of SBO, History of Takotsubo CM, History of SVT.     Ultimately she had a right AKA and has required dialysis with a TDC placed 4/3/24. Flow rates have slowed and dialysis not has effective. A revision has been requested.     -Labs WNL for procedure.    -Orders for NPO and antibiotics have been entered.   -Procedural education reviewed with patient in detail including, but not limited to risks, benefits and alternatives, questions answered with understanding verbalized by her.and consent is in IR.     [place] the IR department [place] [phone] for procedural related questions.     Total time: 25 minutes    Thanks, Maribell [facility] Interventional Radiology CNP ([phone]) (phone [phone])

## 2024-04-15 NOTE — PROGRESS NOTES
CALORIE COUNT      Approximate Oral Intake for: 4/14  Calories: 783  Protein: 73 grams      Number of Meals Recorded: 2        Number of Snacks Recorded: 2      Dosing wt: 48 kg (3/29)    Estimated Needs:    Calories: 9413-9792 (25-30 kcal/kg)  Protein: 55-75 grams (1.2-1.5 g/kg)      Summary:   Diet: NPO per Anesthesia Guidelines for Procedure/Surgery Except for: Meds        NPO since [place] for dialysis catheter exchange today     PO intake yesterday = 65% of min energy needs and 100% of min protein needs      Recommendations:   Goal to remain off TF = meet >60% of estimated nutrition needs via PO intake       3-day summary to follow      Keiko Briceno RD, LD

## 2024-04-16 ENCOUNTER — APPOINTMENT (OUTPATIENT)
Dept: PHYSICAL THERAPY | Facility: CLINIC | Age: 64
DRG: 270 | End: 2024-04-16
Payer: COMMERCIAL

## 2024-04-16 LAB
ALBUMIN SERPL BCG-MCNC: 2.1 G/DL (ref 3.5–5.2)
ANION GAP SERPL CALCULATED.3IONS-SCNC: 16 MMOL/L (ref 7–15)
BUN SERPL-MCNC: 48 MG/DL (ref 8–23)
CALCIUM SERPL-MCNC: 7.7 MG/DL (ref 8.8–10.2)
CHLORIDE SERPL-SCNC: 93 MMOL/L (ref 98–107)
CREAT SERPL-MCNC: 2.55 MG/DL (ref 0.51–0.95)
DEPRECATED HCO3 PLAS-SCNC: 19 MMOL/L (ref 22–29)
EGFRCR SERPLBLD CKD-EPI 2021: 20 ML/MIN/1.73M2
ERYTHROCYTE [DISTWIDTH] IN BLOOD BY AUTOMATED COUNT: 16.3 % (ref 10–15)
GLUCOSE SERPL-MCNC: 81 MG/DL (ref 70–99)
HCT VFR BLD AUTO: 28.3 % (ref 35–47)
HGB BLD-MCNC: 9.2 G/DL (ref 11.7–15.7)
MAGNESIUM SERPL-MCNC: 1.6 MG/DL (ref 1.7–2.3)
MCH RBC QN AUTO: 29.4 PG (ref 26.5–33)
MCHC RBC AUTO-ENTMCNC: 32.5 G/DL (ref 31.5–36.5)
MCV RBC AUTO: 90 FL (ref 78–100)
PHOSPHATE SERPL-MCNC: 3.9 MG/DL (ref 2.5–4.5)
PLATELET # BLD AUTO: 227 10E3/UL (ref 150–450)
POTASSIUM SERPL-SCNC: 4 MMOL/L (ref 3.4–5.3)
RBC # BLD AUTO: 3.13 10E6/UL (ref 3.8–5.2)
SODIUM SERPL-SCNC: 128 MMOL/L (ref 135–145)
WBC # BLD AUTO: 8.7 10E3/UL (ref 4–11)

## 2024-04-16 PROCEDURE — 86704 HEP B CORE ANTIBODY TOTAL: CPT | Performed by: INTERNAL MEDICINE

## 2024-04-16 PROCEDURE — 94640 AIRWAY INHALATION TREATMENT: CPT

## 2024-04-16 PROCEDURE — 999N000157 HC STATISTIC RCP TIME EA 10 MIN

## 2024-04-16 PROCEDURE — 250N000013 HC RX MED GY IP 250 OP 250 PS 637: Performed by: STUDENT IN AN ORGANIZED HEALTH CARE EDUCATION/TRAINING PROGRAM

## 2024-04-16 PROCEDURE — 85027 COMPLETE CBC AUTOMATED: CPT | Performed by: INTERNAL MEDICINE

## 2024-04-16 PROCEDURE — 250N000009 HC RX 250

## 2024-04-16 PROCEDURE — P9045 ALBUMIN (HUMAN), 5%, 250 ML: HCPCS | Mod: JZ | Performed by: INTERNAL MEDICINE

## 2024-04-16 PROCEDURE — 250N000011 HC RX IP 250 OP 636: Mod: JZ | Performed by: HOSPITALIST

## 2024-04-16 PROCEDURE — 94640 AIRWAY INHALATION TREATMENT: CPT | Mod: 76

## 2024-04-16 PROCEDURE — 250N000011 HC RX IP 250 OP 636: Performed by: INTERNAL MEDICINE

## 2024-04-16 PROCEDURE — 80069 RENAL FUNCTION PANEL: CPT | Performed by: INTERNAL MEDICINE

## 2024-04-16 PROCEDURE — 250N000009 HC RX 250: Performed by: STUDENT IN AN ORGANIZED HEALTH CARE EDUCATION/TRAINING PROGRAM

## 2024-04-16 PROCEDURE — 250N000013 HC RX MED GY IP 250 OP 250 PS 637: Performed by: INTERNAL MEDICINE

## 2024-04-16 PROCEDURE — 97530 THERAPEUTIC ACTIVITIES: CPT | Mod: GP | Performed by: PHYSICAL THERAPIST

## 2024-04-16 PROCEDURE — 258N000003 HC RX IP 258 OP 636: Performed by: INTERNAL MEDICINE

## 2024-04-16 PROCEDURE — 99232 SBSQ HOSP IP/OBS MODERATE 35: CPT | Performed by: INTERNAL MEDICINE

## 2024-04-16 PROCEDURE — 120N000001 HC R&B MED SURG/OB

## 2024-04-16 PROCEDURE — 250N000011 HC RX IP 250 OP 636: Performed by: STUDENT IN AN ORGANIZED HEALTH CARE EDUCATION/TRAINING PROGRAM

## 2024-04-16 PROCEDURE — 83735 ASSAY OF MAGNESIUM: CPT | Performed by: STUDENT IN AN ORGANIZED HEALTH CARE EDUCATION/TRAINING PROGRAM

## 2024-04-16 PROCEDURE — 99233 SBSQ HOSP IP/OBS HIGH 50: CPT | Performed by: INTERNAL MEDICINE

## 2024-04-16 RX ORDER — FAMOTIDINE 20 MG/1
20 TABLET, FILM COATED ORAL
Status: DISCONTINUED | OUTPATIENT
Start: 2024-04-16 | End: 2024-04-22 | Stop reason: HOSPADM

## 2024-04-16 RX ORDER — WATER 10 ML/10ML
INJECTION INTRAMUSCULAR; INTRAVENOUS; SUBCUTANEOUS
Status: COMPLETED
Start: 2024-04-16 | End: 2024-04-16

## 2024-04-16 RX ORDER — ALBUMIN (HUMAN) 12.5 G/50ML
50 SOLUTION INTRAVENOUS
Status: DISCONTINUED | OUTPATIENT
Start: 2024-04-16 | End: 2024-04-16

## 2024-04-16 RX ADMIN — ALBUTEROL SULFATE 2.5 MG: 2.5 SOLUTION RESPIRATORY (INHALATION) at 18:48

## 2024-04-16 RX ADMIN — BETAMETHASONE DIPROPIONATE: 0.5 CREAM TOPICAL at 19:44

## 2024-04-16 RX ADMIN — ALBUMIN HUMAN 250 ML: 0.05 INJECTION, SOLUTION INTRAVENOUS at 10:30

## 2024-04-16 RX ADMIN — MICONAZOLE NITRATE: 2 POWDER TOPICAL at 19:45

## 2024-04-16 RX ADMIN — CLOPIDOGREL BISULFATE 75 MG: 75 TABLET ORAL at 13:12

## 2024-04-16 RX ADMIN — ACETAMINOPHEN 650 MG: 325 TABLET, FILM COATED ORAL at 19:44

## 2024-04-16 RX ADMIN — ONDANSETRON 4 MG: 2 INJECTION INTRAMUSCULAR; INTRAVENOUS at 21:14

## 2024-04-16 RX ADMIN — BETAMETHASONE DIPROPIONATE: 0.5 CREAM TOPICAL at 13:13

## 2024-04-16 RX ADMIN — NICOTINE 1 PATCH: 14 PATCH, EXTENDED RELEASE TRANSDERMAL at 13:12

## 2024-04-16 RX ADMIN — WATER 10 ML: 1 INJECTION INTRAMUSCULAR; INTRAVENOUS; SUBCUTANEOUS at 20:33

## 2024-04-16 RX ADMIN — ONDANSETRON 4 MG: 2 INJECTION INTRAMUSCULAR; INTRAVENOUS at 15:22

## 2024-04-16 RX ADMIN — HEPARIN SODIUM 1.6 ML: 1000 INJECTION INTRAVENOUS; SUBCUTANEOUS at 10:41

## 2024-04-16 RX ADMIN — Medication 1 PACKET: at 13:13

## 2024-04-16 RX ADMIN — HEPARIN SODIUM 1.6 ML: 1000 INJECTION INTRAVENOUS; SUBCUTANEOUS at 10:45

## 2024-04-16 RX ADMIN — ALBUTEROL SULFATE 2.5 MG: 2.5 SOLUTION RESPIRATORY (INHALATION) at 13:55

## 2024-04-16 RX ADMIN — SODIUM CHLORIDE 300 ML: 9 INJECTION, SOLUTION INTRAVENOUS at 10:37

## 2024-04-16 RX ADMIN — HYDROMORPHONE HYDROCHLORIDE 4 MG: 2 TABLET ORAL at 06:48

## 2024-04-16 RX ADMIN — Medication 1 CAPSULE: at 13:12

## 2024-04-16 RX ADMIN — AMLODIPINE BESYLATE 5 MG: 5 TABLET ORAL at 13:25

## 2024-04-16 RX ADMIN — HYDROMORPHONE HYDROCHLORIDE 4 MG: 2 TABLET ORAL at 19:44

## 2024-04-16 RX ADMIN — HYDROMORPHONE HYDROCHLORIDE 4 MG: 2 TABLET ORAL at 13:25

## 2024-04-16 RX ADMIN — ALTEPLASE 2 MG: 2.2 INJECTION, POWDER, LYOPHILIZED, FOR SOLUTION INTRAVENOUS at 20:31

## 2024-04-16 RX ADMIN — MICONAZOLE NITRATE: 2 POWDER TOPICAL at 13:13

## 2024-04-16 RX ADMIN — FLUTICASONE FUROATE AND VILANTEROL TRIFENATATE 1 PUFF: 200; 25 POWDER RESPIRATORY (INHALATION) at 13:12

## 2024-04-16 RX ADMIN — Medication 1 CAPSULE: at 19:44

## 2024-04-16 RX ADMIN — CARVEDILOL 6.25 MG: 6.25 TABLET, FILM COATED ORAL at 13:25

## 2024-04-16 ASSESSMENT — ACTIVITIES OF DAILY LIVING (ADL)
ADLS_ACUITY_SCORE: 41
ADLS_ACUITY_SCORE: 41
ADLS_ACUITY_SCORE: 39
ADLS_ACUITY_SCORE: 41
ADLS_ACUITY_SCORE: 41
ADLS_ACUITY_SCORE: 43
ADLS_ACUITY_SCORE: 41
ADLS_ACUITY_SCORE: 41
ADLS_ACUITY_SCORE: 43
ADLS_ACUITY_SCORE: 41
ADLS_ACUITY_SCORE: 41
ADLS_ACUITY_SCORE: 39
ADLS_ACUITY_SCORE: 41
ADLS_ACUITY_SCORE: 39
ADLS_ACUITY_SCORE: 43
ADLS_ACUITY_SCORE: 41
ADLS_ACUITY_SCORE: 43
ADLS_ACUITY_SCORE: 41
ADLS_ACUITY_SCORE: 41
ADLS_ACUITY_SCORE: 43
ADLS_ACUITY_SCORE: 43

## 2024-04-16 NOTE — PROGRESS NOTES
Care Management Follow Up    Length of Stay (days): 18    Expected Discharge Date: 04/15/2024     Concerns to be Addressed: discharge planning  anticipate [facility] Acute Rehab Unit when medically ready and bed  Patient plan of care discussed at interdisciplinary rounds: Yes    Anticipated Discharge Disposition: Acute Rehab     Anticipated Discharge Services: Transportation Services  Anticipated Discharge DME:  (TBD)    Patient/family educated on Medicare website which has current facility and service quality ratings: yes  Education Provided on the Discharge Plan: Yes  Patient/Family in Agreement with the Plan: yes    Referrals Placed by CM/SW:  (ARU)  Private pay costs discussed:  TBD    Additional Information:  Sent message to ARU liaison via Teams to ask when ARU bed might be available. Pt likely to need to participate in today's therapy sessions (currently scheduled 0945 and 1300 but TBD - due to HD session) prior for ARU liaison review.      Kera Turcios RN, BSN, PHN  [facility]  Inpatient Care Management - FLOAT  Gen Surg CM RN Mobile: [phone] daily 7:30-4:00

## 2024-04-16 NOTE — PROGRESS NOTES
CLINICAL NUTRITION SERVICES - REASSESSMENT NOTE      RECOMMENDATIONS FOR MD/PROVIDER TO ORDER:   Kcal ct data (4/10-15): 6 day average PO intake = 461 kcal (38% minimum energy needs) and 41 g protein (75% minimum protein needs)  --> goal to remain off TF is >60%. W/ adequate protein intake and if pt continues to improve energy intake. Ok to remain off TF w/ low threshold to replace if PO intake declines     Recommendations Ordered by Registered Dietitian (RD):   Modified diet to dialysis. Pt declined celiac disease dx, unable to confirm dx per chart review  Continue w/ BID Rigo for surgical wound healing   Continue w/ daily Ensure Enlive chocolate  Switched daily Ensure Max to Ensure Enlive shake (to provide more kcal)     Malnutrition:   % Weight Loss:  Weight loss does not meet criteria for malnutrition (4/4)   % Intake:  </= 50% for >/= 5 days (severe malnutrition) (4/4)  Subcutaneous Fat Loss:  Orbital region moderate depletion and Upper arm region moderate depletion (4/4)  Muscle Loss:  Temporal region moderate-severe depletion and Clavicle bone region moderate depletion (4/4)  Fluid Retention:  Trace to moderate BLE + BUE edema     Malnutrition Diagnosis: Severe malnutrition in the context of --  Acute illness or injury  Chronic illness or disease         EVALUATION OF PROGRESS TOWARD GOALS   Diet: Gluten Free Diet    Fluid restriction 1200 ML FLUID  Supplements: Ensure Max Protein (bariatric); With Meals  Ensure Enlive; With Meals      Intake/Tolerance:  Visited w/ pt during dialysis this AM. Discussed recent kcal ct data- provided encouragement for adequate protein intake, discussed need to increase kcal intake. She reported part of the problem is the lack of options w/ the dialysis diet (though now appears to be removed), gluten-free diet, + fluid restriction. She reported she doesn't remember being dx w/ celiac? She likes the Ensure Max and Enlive oral nutrition supplements. Does not like the apple Ensure  Clear. Discussed switching the Max out for Ensure Enlive shake w/ ice cream to provide more kcal. Pt agreeable. She was feeling hungry during dialysis, looking forward to lunch. RD ordered for her.   Msg'd hospitalist regarding diet and celiac disease-- ok to modify to dialysis   Pt has been receiving 1-2 meals/day + 2 supplements/day per health touch. Fair tolerance of diet, 25-50% intakes documented per nursing flow sheet.      Calorie Count =   4/10: 713 kcal and 58 g protein (1 meals, 4 supplements)  4/11: 349 kcal and 41 g protein (2 meals, 2 supplements)  4/12: 529 kcal and 35 g protein (2 meals, 3 supplements)  4/13: 0 kcal and 0 g protein (0 meals, 0 supplements)  4/14: 783 kcal and 73 g protein (2 meals, 2 supplements)  4/15: 389 kcal and 36 g protein (1 meals, 1 supplements)    6 day average PO intake = 461 kcal (38% minimum energy needs) and 41 g protein (75% minimum protein needs)        ASSESSED NUTRITION NEEDS:  Dosing Weight: 48 kg (3/29)  Estimated Energy Needs: 1708-7868 kcal (25-30 Kcal/Kg)  Justification: maintenance  Estimated Protein Needs: 55-75 grams protein (1.2-1.5 g pro/Kg)  Justification: hypercatabolism with acute illness and dialysis, post-op  Estimated Fluid Needs: 1 mL/Kcal  Justification: maintenance        NEW FINDINGS:   Weight: wt trending up it appears  Date/Time Weight Weight Method   04/16/24 0605 55.5 kg (122 lb 5.7 oz) Bed scale   04/15/24 0619 58.2 kg (128 lb 4.9 oz) Bed scale   04/14/24 0544 60.2 kg (132 lb 11.5 oz) Bed scale   04/13/24 0526 58.5 kg (128 lb 15.5 oz) Bed scale   04/12/24 0614 56.7 kg (125 lb) Bed scale   04/09/24 0650 53.9 kg (118 lb 13.3 oz) Bed scale     I/O: Net I/O Since Admission: 1,664.4 mL [04/16/24 0854].  Stool output = 3x BM on 4/15, 4x on 4/14, 2x on 4/13, 1x on 4/12, 2x on 4/11, 2x on 4/10    Labs: reviewed   Component Value Date    (L) 04/16/2024   BUN 48.0 (H) 04/16/2024   CR 2.55 (H) 04/16/2024   MAG 1.6 (L) 04/16/2024     Lab  04/16/24  0642 04/15/24  2211 04/15/24  1635 04/15/24  1146 04/15/24  0536 04/15/24  0149   GLC 81 85 82 87 84 92      Medications: reviewed   Medication Dose Route Frequency Provider Last Admin    famotidine (PEPCID) tablet 20 mg  20 mg Oral Every Other Day Hardy Falcon MD      lactobacillus rhamnosus (GG) (CULTURELL) capsule 1 capsule  1 capsule Oral BID Mercel, Gala, DO 1 capsule at 04/15/24 2046    multivitamin RENAL (TRIPHROCAPS) capsule 1 capsule  1 capsule Oral Daily Mercel, Gala, DO 1 capsule at 04/15/24 0857     Resp.: currently requiring 1.5 LPM nasal cannula        Previous Goals:   Diet to advance past CLD w/in 24 hours  Evaluation: Met  Pt to meet >/= 60% of estimated nutrition needs via PO intake  Evaluation: Not met    Previous Nutrition Diagnosis:   Inadequate oral intake related to variable diets w/ recent need for TF as evidenced by current CLD, TF discontinued today, need for oral nutrition supplements  Evaluation: Improving        MALNUTRITION  % Weight Loss:  Weight loss does not meet criteria for malnutrition (4/4)   % Intake:  </= 50% for >/= 5 days (severe malnutrition) (4/4)  Subcutaneous Fat Loss:  Orbital region moderate depletion and Upper arm region moderate depletion (4/4)  Muscle Loss:  Temporal region moderate-severe depletion and Clavicle bone region moderate depletion (4/4)  Fluid Retention:  Trace to moderate BLE + BUE edema     Malnutrition Diagnosis: Severe malnutrition in the context of --  Acute illness or injury  Chronic illness or disease        CURRENT NUTRITION DIAGNOSIS  Inadequate oral intake related to variable diets w/ recent need for TF as evidenced by gluten-free diet, TF discontinued 4/9, need for oral nutrition supplements, recent 6-day kcal ct of meeting 38% minimum energy needs and 75% minimum protein needs        INTERVENTIONS  Recommendations / Nutrition Prescription  Kcal ct data (4/10-15): 6 day average PO intake = 461 kcal (38% minimum energy  needs) and 41 g protein (75% minimum protein needs)  --> goal to remain off TF is >60%. W/ adequate protein intake and if pt continues to improve energy intake. Ok to remain off TF w/ low threshold to replace if PO intake declines    Modified diet to dialysis. Pt declined celiac disease dx, unable to confirm dx per chart review  Continue w/ BID Rigo for surgical wound healing   Continue w/ daily Ensure Enlive chocolate  Switched daily Ensure Max to Ensure Enlive shake (to provide more kcal)      Implementation  Collaboration with other providers  Medical food supplement therapy  Nutrition education for nutrition relationship to health/disease    Goals  Pt to consume >50% of 3 meals/day consistently         MONITORING AND EVALUATION:  Progress towards goals will be monitored and evaluated per protocol and Practice Guidelines      Keiko Briceno RD, LD  Pager: [phone]

## 2024-04-16 NOTE — PROGRESS NOTES
[facility]    Medicine Progress Note - Hospitalist Service    Date of Admission:  3/29/2024    Assessment & Plan      Assessment & Plan  Shirley Hendricks is a 63 year old female admitted on 3/29/2024 due to occlusion of right LE bypass graft.       Past medical history significant for PAD/PVD, Tobacco use D/O, CAD (history of MI x3), HTN, HLP, DM2, Neuropathy, COPD, GERD, Anemia, Spongiotic dermatitis, MDD with anxiety, Chronic anticoagulation therapy with history of DVT/PE, OA, Charcot-Breonna-Tooth foot deformity, Marginal zone B-cell lymphoma, History of SBO, History of Takotsubo CM, History of SVT.     Discussed with Dr. Mazariegos and reviewed ED notes and Vascular Surgery consult note.  Patient presented to the ED due to right leg pain that had begun ~ 1 hour prior to presentation.  She reported pain seems to worsen with walking and when she attempted to check a pulse in her leg it was absent.  She also described that the foot is colder than the left.       While in the lobby attempts to find right pedal pulse with doppler were unsuccessful.  Dr. Lozano of Vascular Surgery was contacted by the patient as well as Dr. Mazariegos.       Work-up completed while patient was in the lobby included right arterial LE US revealed the right common femoral artery to mid anterior tibial artery bypass graft occlusion.       She developed acute renal failure that has progressed and is requiring close nephrology co-management and hemodialysis treatments.     Right common femoral artery to mid anterior tibial artery bypass graft acute occlusion  PAD/PVD  Acute Blood Loss Anemia  *Occlusion occurred despite low-dose Xarelto and Plavix therapy.    - Vascular surgery consult she underwent emergent guillotine right above knee amputation on 4/1/24 followed by close monitoring in the ICU.    - underwent right completion AKA on 4/9/24    - Hold PTA Xarelto 15 mg as per vascular surgery, for now    - Plavix  75mg po daily for left bypass     - Statin and antihypertensive management as below.    - Pain management as needed  - Follow Hgb daily  - transfused 1 U PRBCs 03/30 / 03/31 and 04/02 and 04/06 and 04/09 and 04/13 closely monitor.  - CBC daily, transfuse as needed Hgb < 7.0  - PT / OT eval -> TCU   - SW consulted  - Peripheral Smear -> -Posttransfusion sample showing moderate (previously marked) anemia with evidence of red cell regeneration. Myeloid left shift without evidence of dysplastic change or blasts, reactive etiology favored     2. Rhabdomyolysis   Acute renal failure - no requiring hemodialysis  Acute Hyponatremia    Renal US -> No obstruction demonstrated.     - Nephrology following -> now on HD treatments  - Hyponatremia mgtmt per renal service -> improving  - Avoid NSAIDs / nephrotoxins     3. Colon distention -> resolved  Abdominal Pain -> resolved  Assessment:CT abdomen 04/03 -> Medium-sized right retroperitoneal and extraperitoneal hematoma. Evaluation for extravasation is unable to be performed without contrast. This is likely similar to slightly smaller than the CT lumbar spine although this is incompletely visualized at   that time  Extensive pulmonary opacities. Differential: Multifocal infection, ARDS, and/or pulmonary  Plan:  --Did require NG tube during this admission   Tolerating a dialysis diet    4. MDD with anxiety  Acute Delirium   *Noted on chart review.  No interventions.    Delirium from acute illness  Plan:  Delirium has resolved     5. Contrast dye allergy  - Ordered solumedrol and benadryl per contrast dye allergy protocol.       6. Mild hyponatremia  Nephrology managing  Sodium improved somewhat today 128 (125)     7. Tobacco Use D/O   Patient currently smokes max 5 cigarettes per day.     - Counseled regarding smoking cessation that should also continue with PCP.    - Nicoderm patch ordered.     8. Celiac disease  *Patient reported recent GI work-up revealed she has celiac disease  earlier in admission  Received call from RN today (4/16/24) that pt denies being told that and would like to see if her diet can be changed    Per care everywhere - follows with [facility] and saw Dr. Hoffman who diagnosed her with Celiac disease with testing March 2024    Will continue gluten free diet, for now    9. CAD (history of MI x3)  HTN  HLP  *Last coronary angiogram completed 10/2023.    - Resumed on PTA Coreg 6.25 mg BID, hold lisinopril 10 mg/d due to LIMA, continue Norvasc 2.5 mg/d.  Hold parameters in place.    - hold PTA rosuvastatin 40 mg/d due to acute rhabdo  - PRN IV hydralazine 10 mg every 4 hours for SBP GREATER THAN 180.       10. History of Takotsubo CM  *Recovered EF (55-60%) from ECHO 11/2023.    - Resumed on PTA Coreg 6.25 mg BID (hold for SBP < 110), hold lisinopril 10 mg/d and  hold Jardiance 10 mg/d For now given ARF     11. Type 2 DM  Diabetic neuropathy  *Noted diagnosis on chart review.  However, A1c of 5.2%.    - Hold PTA Jardiance 10 mg/d and gabapentin 100 mg TID due to AMS.  - No insulin as borderline hypoglycemic     12. COPD   - Resumed on PTA inhalers.    - Encourage use of Flutter valve/IS.       13. GERD  - Resumed on PTA omeprazole 20 mg/d.       14. Anemia  Recent GI bleed (12/2023 and admitted at [facility])  - Hold PTA Iron supplements and resume at discharge.    - CBC daily       15. Spongiotic dermatitis  *Recently seen at outpatient Derm.    - Resumed on PTA betamethasone cream BID.       16. Chronic anticoagulation therapy with history of DVT/PE  - Hold PTA Xarelto.       17. OA  - Pain management as above.       18. Charcot-Breonna-Tooth foot deformity  *Noted on chart review.  No interventions.      19. Stage VALENTIN marginal zone B-cell lymphoma  *Follows with [facility] (Dr. Barrow).    - Continue outpatient surveillance (CT scan in 8/2024).       20. History of SVT  - Resumed on PTA Coreg as above.            PPE Used:  Mask, gloves          Diet: Snacks/Supplements  Adult: Ensure Max Protein (bariatric); With Meals  Snacks/Supplements Adult: Ensure Enlive; With Meals  Fluid restriction 1200 ML FLUID  Gluten Free Diet    DVT Prophylaxis: xarelto on hold, per vascular surgery  Note left for vascular re: ? Ok to restart chemical DVT prophylaxis or xarelto    Alvarez Catheter: Not present  Lines: PRESENT      PICC 03/31/24 Double Lumen Right Brachial vein medial access-Site Assessment: WDL  [REMOVED] CVC Double Lumen Right Internal jugular Tunneled-Site Assessment: WDL  CVC Double Lumen Right Internal jugular Tunneled-Site Assessment: WDL except      Cardiac Monitoring: None  Code Status: Full Code      Clinically Significant Risk Factors         # Hyponatremia: Lowest Na = 123 mmol/L in last 2 days, will monitor as appropriate      # Hypoalbuminemia: Lowest albumin = 1.6 g/dL at 4/15/2024  5:36 AM, will monitor as appropriate     # Hypertension: Noted on problem list         # Severe Malnutrition: based on nutrition assessment    # Financial/Environmental Concerns:           Disposition Plan     Medically Ready for Discharge: Anticipated in 2-4 Days             Love Villatoro DO  Hospitalist Service  [facility]  Securely message with [employer] (more info)  Text page via [employer] Paging/Directory   ______________________________________________________________________    Interval History   Seen in dialysis this am.      Physical Exam   Vital Signs: Temp: 98.5  F (36.9  C) Temp src: Oral BP: 103/46 Pulse: 66   Resp: 18 SpO2: 94 % O2 Device: Nasal cannula Oxygen Delivery: 1 LPM  Weight: 122 lbs 5.68 oz    GEN:  Alert, awake, appears fairly comfortable, no overt respiratory distress.  HEENT:  Normocephalic/atraumatic, no scleral icterus, no nasal discharge, mouth appears moist.  CV:  Regular rate and rhythm, no loud murmur or JVD.  S1 + S2 noted  LUNGS:  Clear to auscultation ant/lat bilaterally without rales/rhonchi/wheezing/retractions.  Symmetric chest  rise on inhalation noted.  ABD:  Active bowel sounds, soft, non-tender/non-distended this am.  No guarding/rigidity.  EXT:  right AKA site with gauze dressing in place.  1+ pitting edema in left foot and up to left pretibial area.  No cyanosis.    PSYCH;  slightly restless but cooperative    Medical Decision Making       51 MINUTES SPENT BY ME on the date of service doing chart review, history, exam, documentation & further activities per the note.      Data   Medications   Current Facility-Administered Medications   Medication Dose Route Frequency Provider Last Rate Last Admin    dextrose 10% infusion   Intravenous Continuous PRN Marcin White MD        Reason statin not prescribed   Does not apply DOES NOT GO TO Marcin Gomes MD        sodium chloride 0.9 % infusion   Intravenous Continuous Marcin White MD 15 mL/hr at 04/12/24 1841 New Bag at 04/12/24 1841     Current Facility-Administered Medications   Medication Dose Route Frequency Provider Last Rate Last Admin    - MEDICATION INSTRUCTIONS for Dialysis Patients -   Does not apply See Admin Instructions Marcin White MD        albuterol (PROVENTIL) neb solution 2.5 mg  2.5 mg Nebulization Q6H Marcin White MD   2.5 mg at 04/15/24 0759    amLODIPine (NORVASC) tablet 5 mg  5 mg Oral Daily Torrey Alegria MD   5 mg at 04/14/24 0849    betamethasone dipropionate (DIPROSONE) 0.05 % cream   Topical BID Marcin White MD   Given at 04/15/24 2046    carvedilol (COREG) tablet 6.25 mg  6.25 mg Oral BID w/meals Marcin Whtie MD   6.25 mg at 04/15/24 1757    clopidogrel (PLAVIX) tablet 75 mg  75 mg Oral Daily Gala Morales DO   75 mg at 04/15/24 1112    Contraindications to both pharmacological and mechanical prophylaxis (must document contraindications for both in this order)   Does not apply See Admin Instructions Marcin White MD        famotidine (PEPCID) tablet 20 mg  20 mg Oral Every Other Day Hardy Falcon MD        fluticasone-vilanterol (BREO ELLIPTA) 200-25  MCG/ACT inhaler 1 puff  1 puff Inhalation Daily Marcin White MD   1 puff at 04/14/24 0855    Rigo PACK 1 packet  1 packet Oral BID 09 12 Carmen DO Gala   1 packet at 04/14/24 1410    lactobacillus rhamnosus (GG) (CULTURELL) capsule 1 capsule  1 capsule Oral BID Carmen DO Gala   1 capsule at 04/15/24 2046    miconazole (MICATIN) 2 % powder   Topical BID Marcin White MD   Given at 04/15/24 2046    multivitamin RENAL (TRIPHROCAPS) capsule 1 capsule  1 capsule Oral Daily Carmen Gala    1 capsule at 04/15/24 0857    nicotine (NICODERM CQ) 14 MG/24HR 24 hr patch 1 patch  1 patch Transdermal Daily Marcin White MD   1 patch at 04/15/24 0901    sodium chloride (PF) 0.9% PF flush 10-40 mL  10-40 mL Intracatheter Q8H Sofia Cristobal MD   20 mL at 04/16/24 0547     Labs and Imaging results below reviewed today.  Recent Labs   Lab 04/16/24  0642 04/15/24  0536 04/14/24  0542   WBC 8.7 8.0 7.9   HGB 9.2* 8.9* 9.2*   HCT 28.3* 26.4* 27.3*   MCV 90 89 88    202 196     Recent Labs   Lab 04/16/24  0642 04/15/24  2211 04/15/24  1635 04/15/24  1146 04/15/24  0536 04/14/24  2145 04/14/24  0538 04/13/24  1316 04/13/24  0655   *  --   --   --  123*  --   --   --  130*   POTASSIUM 4.0  --   --   --  4.9  --  4.5  --  4.2   CHLORIDE 93*  --   --   --  90*  --   --   --  95*   CO2 19*  --   --   --  23  --   --   --  25   ANIONGAP 16*  --   --   --  10  --   --   --  10   GLC 81 85 82   < > 84   < >  --    < > 69*   BUN 48.0*  --   --   --  81.6*  --   --   --  52.4*   CR 2.55*  --   --   --  3.50*  --   --   --  2.62*   GFRESTIMATED 20*  --   --   --  14*  --   --   --  20*   SONIA 7.7*  --   --   --  7.5*  --   --   --  7.2*    < > = values in this interval not displayed.     7-Day Micro Results       Collected Updated Procedure Result Status      04/12/2024 2046 04/13/2024 0007 C. difficile Toxin B PCR with reflex to C. difficile Antigen and Toxins A/B EIA [[id number]]    Stool from Per Rectum  "   Final result Component Value   C Difficile Toxin B by PCR Negative   A negative result does not exclude actual disease due to C. difficile and may be due to improper collection, handling and storage of the specimen or the number of organisms in the specimen is below the detection limit of the assay.                  Recent Labs   Lab 04/16/24  0642 04/15/24  2211 04/15/24  1635 04/15/24  1146 04/15/24  0536 04/14/24  2145 04/14/24  0538 04/13/24  1316 04/13/24  0655   *  --   --   --  123*  --   --   --  130*   POTASSIUM 4.0  --   --   --  4.9  --  4.5  --  4.2   CHLORIDE 93*  --   --   --  90*  --   --   --  95*   CO2 19*  --   --   --  23  --   --   --  25   ANIONGAP 16*  --   --   --  10  --   --   --  10   GLC 81 85 82   < > 84   < >  --    < > 69*   BUN 48.0*  --   --   --  81.6*  --   --   --  52.4*   CR 2.55*  --   --   --  3.50*  --   --   --  2.62*   GFRESTIMATED 20*  --   --   --  14*  --   --   --  20*   SONIA 7.7*  --   --   --  7.5*  --   --   --  7.2*   MAG 1.6*  --   --   --  1.7  --  1.7  --  1.6*   PHOS 3.9  --   --   --  4.9*  --  4.0  --  3.3   ALBUMIN 2.1*  --   --   --  1.6*  --   --   --   --     < > = values in this interval not displayed.     Recent Labs   Lab 04/16/24  0642 04/15/24  0536 04/14/24  0542   HGB 9.2* 8.9* 9.2*     Recent Labs   Lab 04/11/24  0643   INR 1.31*     No results for input(s): \"LACT\" in the last 168 hours.    Recent Results (from the past 24 hour(s))   IR CVC Tunnel Revision Right    Narrative    IR CVC TUNNEL REVISION RIGHT , DISRUPTION OF FIBRIN SHEATH UTILIZING  ANGIOPLASTY BALLOON 4/15/2024 10:44 AM     HISTORY: Malfunctioning/thrombosed tunneled central venous catheter.    COMPARISON: None.    DESCRIPTION OF PROCEDURE: After obtaining informed consent, the  patient was placed in a supine position on the fluoroscopy table. The  neck, chest, and external portions of a tunneled central venous  catheter were prepped and draped in the usual sterile manner. " 1%  lidocaine was injected for local anesthesia. A stiff Glidewire was  passed through one of the lumens of the tunneled catheter and  maneuvered into the inferior vena cava. The catheter was then pulled  and removed over the wire. A new tunneled central venous catheter was  then passed over the wire, through the internal jugular vein and into  the right atrium. The tip of the catheter was positioned in the mid  right atrium. The ports were aspirated. There was difficulty  aspirating from one of the ports. It was felt that this was likely due  to a residual fibrin sheath. Therefore, the catheter was removed over  wire. An 8 mm balloon was then passed over the wire and used to  perform angioplasty from the right atrium into the SVC to disrupt  possible fibrin sheath. Subsequently, the catheter was then replaced  over the wire and the tip was positioned in the mid to lower right  atrium. Both ports were able to be aspirated successfully this time.  The ports were then flushed with saline, and heparin locked. A  fluoroscopic image was obtained to document catheter tip position. The  catheter was sutured to the patient's skin using 2-0 Ethilon.    I determined this patient to be an appropriate candidate for the  planned sedation and procedure and reassessed the patient immediately  prior to sedation and procedure. Moderate intravenous conscious  sedation was supervised by me. The patient was independently monitored  by a registered nurse assigned to the Department of radiology using  automated blood pressure, EKG and pulse oximetry. The patient  tolerated the procedure well. There were no immediate postprocedure  complications. The patient's vital signs were monitored by radiology  nursing staff under my supervision and remained stable throughout the  study. Radiation dose for this scan was reduced using automated  exposure control, adjustment of the mA and/or kV according to patient  size, or iterative reconstruction  technique.    MEDICATIONS: 1 mg Versed, 50 mg fentanyl    SEDATION TIME: 22 minutes    FLUOROSCOPY TIME: 1.2 minutes    FLUOROSCOPIC DOSE: 6.99 mGy Air Kerma    NUMBER OF FLUOROSCOPIC IMAGES: 6      Impression    IMPRESSION: Tunneled central venous catheter placed as above.  Maximal Sterile Barrier Technique Utilized: Cap AND mask AND sterile  gown AND sterile gloves AND sterile full body drape AND hand hygiene  AND skin preparation 2% chlorhexidine for cutaneous antisepsis (or  acceptable alternative antiseptics).     Sterile Ultrasound Technique Utilized ?Sterile gel AND sterile probe  covers.      ALBERTO BENITEZ MD         SYSTEM ID:  [id number]

## 2024-04-16 NOTE — PROGRESS NOTES
Potassium   Date Value Ref Range Status   04/16/2024 4.0 3.4 - 5.3 mmol/L Final   12/29/2022 4.3 3.4 - 5.3 mmol/L Final   07/09/2021 5.2 3.4 - 5.3 mmol/L Final     Hemoglobin   Date Value Ref Range Status   04/16/2024 9.2 (L) 11.7 - 15.7 g/dL Final   07/09/2021 8.8 (L) 11.7 - 15.7 g/dL Final     Creatinine   Date Value Ref Range Status   04/16/2024 2.55 (H) 0.51 - 0.95 mg/dL Final   07/09/2021 0.77 0.52 - 1.04 mg/dL Final     Urea Nitrogen   Date Value Ref Range Status   04/16/2024 48.0 (H) 8.0 - 23.0 mg/dL Final   12/29/2022 17 7 - 30 mg/dL Final   07/09/2021 13 7 - 30 mg/dL Final     Sodium   Date Value Ref Range Status   04/16/2024 128 (L) 135 - 145 mmol/L Final     Comment:     Reference intervals for this test were updated on 09/26/2023 to more accurately reflect our healthy population. There may be differences in the flagging of prior results with similar values performed with this method. Interpretation of those prior results can be made in the context of the updated reference intervals.    07/09/2021 132 (L) 133 - 144 mmol/L Final     INR   Date Value Ref Range Status   04/11/2024 1.31 (H) 0.85 - 1.15 Final   09/24/2020 1.01 0.86 - 1.14 Final       DIALYSIS PROCEDURE NOTE  Hepatitis status of previous patient on machine log was checked and verified ok to use with this patients hepatitis status.  Patient dialyzed for 3 hrs. on a K3 bath with a net fluid removal of  3L.  A BFR of 400   ml/min was obtained via a Right CVC.     The treatment plan was discussed with Dr. Alegria during the treatment.    Line flushed, clamped and capped with heparin 1:1000 1.6 mL (1600 units) per lumen    Meds  given: Albumen    Complications: None      Person educated: Patient Shirley Hendricks. Knowledge base moderate. Barriers to learning: none. Educated on decreasing water intake and reasons for wearing a mask during dressing changes and accessing CVC via verbal communication mode. Shirley Hendricks patient verbalized understanding.    ICEBOAT? Timeout performed pre-treatment  I: Patient was identified using 2 identifiers  C:  Consent Signed Yes  E: Equipment preventative maintenance is current and dialysis delivery system OK to use  B: Hepatitis B Surface Antigen: negative; Draw Date: 4/3/24      Hepatitis B Surface Antibody: susceptable; Draw Date: 4/3/24  O: Dialysis orders present and complete prior to treatment  A: Vascular access verified and assessed prior to treatment  T: Treatment was performed at a clinically appropriate time  ?: Patient was allowed to ask questions and address concerns prior to treatment  See Adult Hemodialysis flowsheet in [facility] for further details and post assessment.  Machine water alarm in place and functioning. Transducer pods intact and checked every 15min.   Pt assisted with repositioning throughout dialysis treatment.  Pt returned via bed.  Chlorine/Chloramine water system checked every 4 hours.  Outpatient Dialysis at [facility]      Post treatment report given to Eun Amador RN regarding 3L of fluid removed, last BP

## 2024-04-16 NOTE — PLAN OF CARE
Surgery/POD#: POD#15 s/p R AKA  Orientation: A&Ox4  ABNL VS/O2: VSS on r/a  ABNL Labs: all...  Pain Management: PO Dilaudid Bowel/Bladder: On HD, loose BM incont/commode  Drains: R PICC needs cath peggy, red lumen does not flush, paged hosp. Dialysis port.  Wounds/incisions: R AKA dressing small amt drainage, bilateral groin sites RIN. Blanchable redness to coccyx-Mepilex CDI.  miconazole powder applied to bilateral groin crease.  Diet: Gluten free w/ 1200 ml FR, reinforced importance  Activity Level: Ax2 w/ lift, or 2+ serasteady  Tests/Procedures: Dialysis today (4/16).  Anticipated  DC Date: Pending ARU vs TCU bed with dialysis.   Significant Information: Nauseated w-PT today, still got up to chair. Needed lift to get back. Pt needs care conference, doesn't seem to understand severity of condition, and level of care she will need for foreseeable future.

## 2024-04-16 NOTE — PLAN OF CARE
Surgery/POD#: POD#15 s/p R AKA  Orientation: A&Ox4  ABNL VS/O2: VSS on 1L oxygen at times  ABNL Labs: Hgb 9.2. Liver and kidney function.   Pain Management: PRN PO Dilaudid Bowel/Bladder: On HD, loose BM x1 in commode   Drains: R PICC SL good BR on purple line, no BR on red line. Dialysis port.  Wounds/incisions: R AKA dressing CDI, bilateral groin sites RIN. Blanchable redness to coccyx-Mepilex CDI. Excoriated labia/ perineum, miconazole powder applied.  Diet: Gluten free w/ 1200 ml FR  Activity Level: Ax2 w/ lift, T/R q2 hours. Refused to use lift . Used commode X1 with AX2.  Tests/Procedures: HD cath replaced by IR today. Dialysis today.  Anticipated  DC Date: Possible today. Pending on TCU bed with dialysis.   Significant Information: K+/Mag/Phos replacement protocols.Pain management.

## 2024-04-16 NOTE — PLAN OF CARE
Goal Outcome Evaluation:      Plan of Care Reviewed With: patient    Overall Patient Progress: improvingOverall Patient Progress: improving    Outcome Evaluation: Gluten-free diet. Kcal ct done = adequate protein, poor energy intake. Adjusted supplements to provide more kcal. Adjusted diet w/ hospitalist ok to provide more options    Keiko Briceno RD, LD

## 2024-04-16 NOTE — PROGRESS NOTES
Renal Medicine Progress Note            Assessment/Plan:     # Patient with a creatinine of 0.45-0.9 mg/dl at baseline.      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               -dialysis dependent     # Ischemic RLE due to occluded bypass graft   -AKA due to unsalvagable limb 04/01/24     # FEN: 3+ pitting edema. Hyponatremia hypervolemia.      # Anemia:                -EPO with HD     Plan:  # 3 hrs HD. UF ~ 3 liters net as tolerates. Na 140. Qb 350-400.   # Plan for HD again tomorrow          Interval History:     HD is doing well.  UF ~ 3 liters net.  She is thirsty.  I advised her on limiting her fluid intake to ~ 1500 ml daily.           Medications and Allergies:     Current Facility-Administered Medications   Medication Dose Route Frequency Provider Last Rate Last Admin    - MEDICATION INSTRUCTIONS for Dialysis Patients -   Does not apply See Admin Instructions Marcin White MD        albuterol (PROVENTIL) neb solution 2.5 mg  2.5 mg Nebulization Q6H Marcin White MD   2.5 mg at 04/15/24 0759    amLODIPine (NORVASC) tablet 5 mg  5 mg Oral Daily Torrey Alegria MD   5 mg at 04/14/24 0849    betamethasone dipropionate (DIPROSONE) 0.05 % cream   Topical BID Marcin White MD   Given at 04/15/24 2046    carvedilol (COREG) tablet 6.25 mg  6.25 mg Oral BID w/meals Marcin White MD   6.25 mg at 04/15/24 1757    clopidogrel (PLAVIX) tablet 75 mg  75 mg Oral Daily Gala Morales DO   75 mg at 04/15/24 1112    Contraindications to both pharmacological and mechanical prophylaxis (must document contraindications for both in this order)   Does not apply See Admin Instructions Marcin White MD        famotidine (PEPCID) tablet 20 mg  20 mg Oral Q48H LevarSofia walden MD        fluticasone-vilanterol (BREO ELLIPTA) 200-25 MCG/ACT inhaler 1 puff  1 puff Inhalation Daily Marcin White MD   1 puff at 04/14/24 0855    Rigo PACK 1 packet  1 packet Oral BID 09 12 Gala Morales DO  "  1 packet at 04/14/24 1410    lactobacillus rhamnosus (GG) (CULTURELL) capsule 1 capsule  1 capsule Oral BID Mercel, Gala, DO   1 capsule at 04/15/24 2046    miconazole (MICATIN) 2 % powder   Topical BID Marcin White MD   Given at 04/15/24 2046    multivitamin RENAL (TRIPHROCAPS) capsule 1 capsule  1 capsule Oral Daily Gala Morales, DO   1 capsule at 04/15/24 0857    nicotine (NICODERM CQ) 14 MG/24HR 24 hr patch 1 patch  1 patch Transdermal Daily Marcin White MD   1 patch at 04/15/24 0901    No heparin via hemodialysis machine   Does not apply Once Torrey Alegria MD        sodium chloride (PF) 0.9% PF flush 10-40 mL  10-40 mL Intracatheter Q8H Sofia Cristobal MD   20 mL at 04/16/24 0547        Allergies   Allergen Reactions    Pantoprazole      Protonix caused diffuse edema    Chantix [Varenicline]      Terrible dreams    Contrast Dye Swelling     Patient reports facial and throat swelling with prior CT contrast.    Gluten Meal GI Disturbance     Pt has celiac disease    Penicillins Itching and Rash            Physical Exam:   Vitals were reviewed   , Blood pressure 139/80, pulse 68, temperature 98.3  F (36.8  C), temperature source Oral, resp. rate 20, height 1.549 m (5' 1\"), weight 55.5 kg (122 lb 5.7 oz), SpO2 99%, not currently breastfeeding.    Wt Readings from Last 3 Encounters:   04/16/24 55.5 kg (122 lb 5.7 oz)   01/08/24 49.8 kg (109 lb 12.8 oz)   12/12/23 50.8 kg (112 lb)       Intake/Output Summary (Last 24 hours) at 4/16/2024 1141  Last data filed at 4/16/2024 1100  Gross per 24 hour   Intake 610 ml   Output 3000 ml   Net -2390 ml     GENERAL APPEARANCE: Frail.   HEENT:  Eyes/ears/nose/neck grossly normal  RESP: lungs cta b c good efforts, no crackles, rhonchi or wheezes  CV: RRR  ABDOMEN: Soft, NT  EXTREMITIES/SKIN:R AKA. Improved. 2+ edema.   NEURO: Awake, alert and conversing.  R TDC CDI         Data:     CBC RESULTS:     Recent Labs   Lab 04/16/24  0642 04/15/24  0536 " 04/14/24  0542 04/13/24  0655 04/11/24  0643 04/09/24  1817   WBC 8.7 8.0 7.9 6.0  6.0 5.0 3.9*   RBC 3.13* 2.98* 3.09* 2.29*  2.29* 2.87* 3.03*   HGB 9.2* 8.9* 9.2* 6.7*  6.7* 8.4* 9.0*   HCT 28.3* 26.4* 27.3* 20.2*  20.2* 24.6* 26.0*    202 196 157  157 151 105*       Basic Metabolic Panel:  Recent Labs   Lab 04/16/24  0642 04/15/24  2211 04/15/24  1635 04/15/24  1146 04/15/24  0536 04/15/24  0149 04/14/24  2145 04/14/24  0538 04/13/24  1316 04/13/24  0655 04/12/24  0603 04/12/24  0553 04/11/24  0800 04/11/24  0643 04/10/24  2156 04/10/24  1835   *  --   --   --  123*  --   --   --   --  130*  --  127*  --  128*  --  130*   POTASSIUM 4.0  --   --   --  4.9  --   --  4.5  --  4.2  --  4.9  --  4.6  4.7  --  4.6   CHLORIDE 93*  --   --   --  90*  --   --   --   --  95*  --  93*  --  93*  --  94*   CO2 19*  --   --   --  23  --   --   --   --  25  --  21*  --  26  --  25   BUN 48.0*  --   --   --  81.6*  --   --   --   --  52.4*  --  76.4*  --  45.9*  --  34.8*   CR 2.55*  --   --   --  3.50*  --   --   --   --  2.62*  --  3.17*  --  2.56*  --  2.09*   GLC 81 85 82 87 84 92   < >  --    < > 69*   < > 60*   < > 74   < > 79   SONIA 7.7*  --   --   --  7.5*  --   --   --   --  7.2*  --  7.6*  --  7.4*  --  7.5*    < > = values in this interval not displayed.       INR  Recent Labs   Lab 04/11/24  0643   INR 1.31*      Attestation:   I have reviewed today's relevant vital signs, notes, medications, labs and imaging.    Torrey Alegria MD  [facility]phrology  Office phone :[phone]  Pager: [phone]

## 2024-04-17 ENCOUNTER — APPOINTMENT (OUTPATIENT)
Dept: PHYSICAL THERAPY | Facility: CLINIC | Age: 64
DRG: 270 | End: 2024-04-17
Payer: COMMERCIAL

## 2024-04-17 ENCOUNTER — APPOINTMENT (OUTPATIENT)
Dept: SPEECH THERAPY | Facility: CLINIC | Age: 64
DRG: 270 | End: 2024-04-17
Payer: COMMERCIAL

## 2024-04-17 ENCOUNTER — APPOINTMENT (OUTPATIENT)
Dept: OCCUPATIONAL THERAPY | Facility: CLINIC | Age: 64
DRG: 270 | End: 2024-04-17
Payer: COMMERCIAL

## 2024-04-17 LAB
ALBUMIN SERPL BCG-MCNC: 2.3 G/DL (ref 3.5–5.2)
ANION GAP SERPL CALCULATED.3IONS-SCNC: 14 MMOL/L (ref 7–15)
BUN SERPL-MCNC: 41.2 MG/DL (ref 8–23)
CALCIUM SERPL-MCNC: 7.7 MG/DL (ref 8.8–10.2)
CHLORIDE SERPL-SCNC: 95 MMOL/L (ref 98–107)
CREAT SERPL-MCNC: 2.01 MG/DL (ref 0.51–0.95)
DEPRECATED HCO3 PLAS-SCNC: 22 MMOL/L (ref 22–29)
EGFRCR SERPLBLD CKD-EPI 2021: 27 ML/MIN/1.73M2
ERYTHROCYTE [DISTWIDTH] IN BLOOD BY AUTOMATED COUNT: 16.9 % (ref 10–15)
GLUCOSE BLDC GLUCOMTR-MCNC: 118 MG/DL (ref 70–99)
GLUCOSE BLDC GLUCOMTR-MCNC: 77 MG/DL (ref 70–99)
GLUCOSE SERPL-MCNC: 82 MG/DL (ref 70–99)
HBV CORE AB SERPL QL IA: NONREACTIVE
HCT VFR BLD AUTO: 25.9 % (ref 35–47)
HGB BLD-MCNC: 8.5 G/DL (ref 11.7–15.7)
MAGNESIUM SERPL-MCNC: 1.6 MG/DL (ref 1.7–2.3)
MCH RBC QN AUTO: 29.8 PG (ref 26.5–33)
MCHC RBC AUTO-ENTMCNC: 32.8 G/DL (ref 31.5–36.5)
MCV RBC AUTO: 91 FL (ref 78–100)
PHOSPHATE SERPL-MCNC: 3 MG/DL (ref 2.5–4.5)
PLATELET # BLD AUTO: 225 10E3/UL (ref 150–450)
POTASSIUM SERPL-SCNC: 3.9 MMOL/L (ref 3.4–5.3)
RBC # BLD AUTO: 2.85 10E6/UL (ref 3.8–5.2)
SODIUM SERPL-SCNC: 131 MMOL/L (ref 135–145)
WBC # BLD AUTO: 8.5 10E3/UL (ref 4–11)

## 2024-04-17 PROCEDURE — 94640 AIRWAY INHALATION TREATMENT: CPT | Mod: 76

## 2024-04-17 PROCEDURE — 250N000009 HC RX 250: Performed by: STUDENT IN AN ORGANIZED HEALTH CARE EDUCATION/TRAINING PROGRAM

## 2024-04-17 PROCEDURE — 250N000013 HC RX MED GY IP 250 OP 250 PS 637: Performed by: INTERNAL MEDICINE

## 2024-04-17 PROCEDURE — 80069 RENAL FUNCTION PANEL: CPT | Performed by: INTERNAL MEDICINE

## 2024-04-17 PROCEDURE — 97110 THERAPEUTIC EXERCISES: CPT | Mod: GO | Performed by: OCCUPATIONAL THERAPIST

## 2024-04-17 PROCEDURE — 250N000013 HC RX MED GY IP 250 OP 250 PS 637: Performed by: STUDENT IN AN ORGANIZED HEALTH CARE EDUCATION/TRAINING PROGRAM

## 2024-04-17 PROCEDURE — 634N000001 HC RX 634: Mod: JZ | Performed by: INTERNAL MEDICINE

## 2024-04-17 PROCEDURE — 90935 HEMODIALYSIS ONE EVALUATION: CPT | Performed by: INTERNAL MEDICINE

## 2024-04-17 PROCEDURE — 99233 SBSQ HOSP IP/OBS HIGH 50: CPT | Performed by: HOSPITALIST

## 2024-04-17 PROCEDURE — 250N000011 HC RX IP 250 OP 636: Mod: JZ | Performed by: STUDENT IN AN ORGANIZED HEALTH CARE EDUCATION/TRAINING PROGRAM

## 2024-04-17 PROCEDURE — 90937 HEMODIALYSIS REPEATED EVAL: CPT

## 2024-04-17 PROCEDURE — 120N000001 HC R&B MED SURG/OB

## 2024-04-17 PROCEDURE — 999N000157 HC STATISTIC RCP TIME EA 10 MIN

## 2024-04-17 PROCEDURE — 94640 AIRWAY INHALATION TREATMENT: CPT

## 2024-04-17 PROCEDURE — 97530 THERAPEUTIC ACTIVITIES: CPT | Mod: GP | Performed by: PHYSICAL THERAPIST

## 2024-04-17 PROCEDURE — 83735 ASSAY OF MAGNESIUM: CPT | Performed by: HOSPITALIST

## 2024-04-17 PROCEDURE — 85027 COMPLETE CBC AUTOMATED: CPT | Performed by: INTERNAL MEDICINE

## 2024-04-17 PROCEDURE — 258N000003 HC RX IP 258 OP 636: Performed by: INTERNAL MEDICINE

## 2024-04-17 PROCEDURE — 250N000011 HC RX IP 250 OP 636: Performed by: STUDENT IN AN ORGANIZED HEALTH CARE EDUCATION/TRAINING PROGRAM

## 2024-04-17 PROCEDURE — 250N000011 HC RX IP 250 OP 636: Performed by: INTERNAL MEDICINE

## 2024-04-17 PROCEDURE — 92526 ORAL FUNCTION THERAPY: CPT | Mod: GN

## 2024-04-17 RX ORDER — ALBUMIN (HUMAN) 12.5 G/50ML
50 SOLUTION INTRAVENOUS
Status: DISCONTINUED | OUTPATIENT
Start: 2024-04-17 | End: 2024-04-17

## 2024-04-17 RX ORDER — HEPARIN SODIUM 5000 [USP'U]/.5ML
5000 INJECTION, SOLUTION INTRAVENOUS; SUBCUTANEOUS EVERY 8 HOURS
Status: DISCONTINUED | OUTPATIENT
Start: 2024-04-17 | End: 2024-04-22 | Stop reason: HOSPADM

## 2024-04-17 RX ADMIN — MICONAZOLE NITRATE: 2 POWDER TOPICAL at 20:23

## 2024-04-17 RX ADMIN — Medication 1 CAPSULE: at 11:45

## 2024-04-17 RX ADMIN — HEPARIN SODIUM 5000 UNITS: 5000 INJECTION, SOLUTION INTRAVENOUS; SUBCUTANEOUS at 17:50

## 2024-04-17 RX ADMIN — EPOETIN ALFA-EPBX 10000 UNITS: 10000 INJECTION, SOLUTION INTRAVENOUS; SUBCUTANEOUS at 09:14

## 2024-04-17 RX ADMIN — Medication: at 09:13

## 2024-04-17 RX ADMIN — AMLODIPINE BESYLATE 5 MG: 5 TABLET ORAL at 11:54

## 2024-04-17 RX ADMIN — CARVEDILOL 6.25 MG: 6.25 TABLET, FILM COATED ORAL at 11:54

## 2024-04-17 RX ADMIN — ALBUTEROL SULFATE 2.5 MG: 2.5 SOLUTION RESPIRATORY (INHALATION) at 20:43

## 2024-04-17 RX ADMIN — ALBUTEROL SULFATE 2.5 MG: 2.5 SOLUTION RESPIRATORY (INHALATION) at 07:25

## 2024-04-17 RX ADMIN — IRON SUCROSE 100 MG: 20 INJECTION, SOLUTION INTRAVENOUS at 09:14

## 2024-04-17 RX ADMIN — Medication 1 CAPSULE: at 11:53

## 2024-04-17 RX ADMIN — ALBUTEROL SULFATE 2.5 MG: 2.5 SOLUTION RESPIRATORY (INHALATION) at 01:20

## 2024-04-17 RX ADMIN — Medication 1 CAPSULE: at 20:21

## 2024-04-17 RX ADMIN — ONDANSETRON 4 MG: 2 INJECTION INTRAMUSCULAR; INTRAVENOUS at 13:05

## 2024-04-17 RX ADMIN — METOCLOPRAMIDE 5 MG: 5 INJECTION, SOLUTION INTRAMUSCULAR; INTRAVENOUS at 17:47

## 2024-04-17 RX ADMIN — CARVEDILOL 6.25 MG: 6.25 TABLET, FILM COATED ORAL at 17:57

## 2024-04-17 RX ADMIN — ACETAMINOPHEN 650 MG: 325 TABLET, FILM COATED ORAL at 17:57

## 2024-04-17 RX ADMIN — NICOTINE 1 PATCH: 14 PATCH, EXTENDED RELEASE TRANSDERMAL at 11:41

## 2024-04-17 RX ADMIN — SODIUM CHLORIDE 300 ML: 9 INJECTION, SOLUTION INTRAVENOUS at 09:13

## 2024-04-17 RX ADMIN — ACETAMINOPHEN 650 MG: 325 TABLET, FILM COATED ORAL at 13:24

## 2024-04-17 RX ADMIN — HYDROMORPHONE HYDROCHLORIDE 2 MG: 2 TABLET ORAL at 13:24

## 2024-04-17 RX ADMIN — CLOPIDOGREL BISULFATE 75 MG: 75 TABLET ORAL at 11:54

## 2024-04-17 RX ADMIN — HYDROMORPHONE HYDROCHLORIDE 4 MG: 2 TABLET ORAL at 17:57

## 2024-04-17 RX ADMIN — SODIUM CHLORIDE 250 ML: 9 INJECTION, SOLUTION INTRAVENOUS at 09:13

## 2024-04-17 RX ADMIN — MICONAZOLE NITRATE: 2 POWDER TOPICAL at 11:52

## 2024-04-17 ASSESSMENT — ACTIVITIES OF DAILY LIVING (ADL)
ADLS_ACUITY_SCORE: 39
ADLS_ACUITY_SCORE: 39
ADLS_ACUITY_SCORE: 35
ADLS_ACUITY_SCORE: 39
ADLS_ACUITY_SCORE: 39
ADLS_ACUITY_SCORE: 35
ADLS_ACUITY_SCORE: 39
ADLS_ACUITY_SCORE: 39
ADLS_ACUITY_SCORE: 34
ADLS_ACUITY_SCORE: 39
ADLS_ACUITY_SCORE: 39
ADLS_ACUITY_SCORE: 35
ADLS_ACUITY_SCORE: 39
ADLS_ACUITY_SCORE: 34
ADLS_ACUITY_SCORE: 34
ADLS_ACUITY_SCORE: 39
ADLS_ACUITY_SCORE: 35
ADLS_ACUITY_SCORE: 39
ADLS_ACUITY_SCORE: 39
ADLS_ACUITY_SCORE: 34

## 2024-04-17 NOTE — PROGRESS NOTES
Care Management Follow Up    Length of Stay (days): 19    Expected Discharge Date: 04/17/2024     Concerns to be Addressed: discharge planning  anticipate [facility] when medically ready and bed  Patient plan of care discussed at interdisciplinary rounds: Yes    Anticipated Discharge Disposition: Acute Rehab     Anticipated Discharge Services: Transportation Services  Anticipated Discharge DME:  (TBD)    Patient/family educated on Medicare website which has current facility and service quality ratings: yes  Education Provided on the Discharge Plan: Yes  Patient/Family in Agreement with the Plan: yes    Referrals Placed by CM/SW:  (ARU)  Private pay costs discussed: Not applicable    Additional Information:  Writer received phone call from Lupe at [facility] stating still needing Hep B Core antibody. Lab results faxed to [facility] at fax [phone]. Message left for Lupe at [facility] at [phone] ext [phone] writer spoke to ARU liaison and patient will require Tuesday, Thursday, Saturday dialysis at [facility]. Writer also received a phone call from Lupe at [facility] and patient's confirmed chair time is Tuesday, Thursday and Saturday at 0640 with arrival on first day at 0600. The unit could take on April 23rd. Patient will dialyze at [facility] at [address] [place] [address] The phone number for the unit is [phone]. Page out to Dr. Alegria to discuss dialysis times.     Writer updated  patient and daughter Malu at the bedside regarding ARU and outpatient dialysis unit chair time.     Leonor Mcguire RN   [facility]   Phone [phone], [place] or [phone]

## 2024-04-17 NOTE — PLAN OF CARE
Goal Outcome Evaluation:  Date & Time: April 17, 2024 7695-1721   Surgery/POD#: POD 16 R AKA   Behavior & Aggression: Green   Fall Risk: Yes   Orientation:A&Ox4   ABNL VS/O2:VSS on RA   ABNL Labs: NA   Pain Management:PRN PO dilaudid, tylenol   Bowel/Bladder: Hemodialysis, multiple loose stools   Drains: PICC,   Diet:Gluten free, FR 1200 mL   Activity Level: A2 T&R, Sarasteady w PT   Tests/Procedures: NA   Anticipated  DC Date: Pending ARU vs TCU bed with dialysis.   Significant Information: R PICC, blood return noted. R AKA dressing small amt drainage, bilateral groin sites RIN. Blanchable redness to coccyx-Mepilex CDI. miconazole powder applied to bilateral groin crease.

## 2024-04-17 NOTE — PROGRESS NOTES
VASCULAR SURGERY    Overall doing well.  Had a good day yesterday.  Tolerating diet.  Feels that supplements caused loose stools.    Less pain at right AKA site.    Still requiring hemodialysis.  we are unsure whether renal function will improve and this was discussed again with patient.  Appreciate care of Dr. Alegria and his associates.    Dietary saw patient yesterday.  Recommended a regular gluten-free diet along with the protein supplements.       Chadwick Lozano MD

## 2024-04-17 NOTE — PROGRESS NOTES
Potassium   Date Value Ref Range Status   04/17/2024 3.9 3.4 - 5.3 mmol/L Final   12/29/2022 4.3 3.4 - 5.3 mmol/L Final   07/09/2021 5.2 3.4 - 5.3 mmol/L Final     Hemoglobin   Date Value Ref Range Status   04/17/2024 8.5 (L) 11.7 - 15.7 g/dL Final   07/09/2021 8.8 (L) 11.7 - 15.7 g/dL Final     Creatinine   Date Value Ref Range Status   04/17/2024 2.01 (H) 0.51 - 0.95 mg/dL Final   07/09/2021 0.77 0.52 - 1.04 mg/dL Final     Urea Nitrogen   Date Value Ref Range Status   04/17/2024 41.2 (H) 8.0 - 23.0 mg/dL Final   12/29/2022 17 7 - 30 mg/dL Final   07/09/2021 13 7 - 30 mg/dL Final     Sodium   Date Value Ref Range Status   04/17/2024 131 (L) 135 - 145 mmol/L Final     Comment:     Reference intervals for this test were updated on 09/26/2023 to more accurately reflect our healthy population. There may be differences in the flagging of prior results with similar values performed with this method. Interpretation of those prior results can be made in the context of the updated reference intervals.    07/09/2021 132 (L) 133 - 144 mmol/L Final     INR   Date Value Ref Range Status   04/11/2024 1.31 (H) 0.85 - 1.15 Final   09/24/2020 1.01 0.86 - 1.14 Final       DIALYSIS PROCEDURE NOTE  Hepatitis status of previous patient on machine log was checked and verified ok to use with this patients hepatitis status.  Patient dialyzed for 3 hrs. on a K3 bath with a net fluid removal of  3.6L.  A BFR of 350 ml/min was obtained via a CVC       The treatment plan was discussed with Dr. Alegria during the treatment.    Total heparin received during the treatment: 0 units.   Line flushed, clamped and capped with heparin 1:1000 1.6 mL (1600 units) per lumen    Meds  given: epo 10,000, venofer, see MAR   Complications: none      Person educated: patient. Knowledge base minimal. Barriers to learning: none. Educated on procedure via verbal mode. patient/family verbalized understanding.     ICEBOAT? Timeout performed pre-treatment  I: Patient  was identified using 2 identifiers  C:  Consent Signed Yes  E: Equipment preventative maintenance is current and dialysis delivery system OK to use  B: Hepatitis B Surface Antigen:    Latest Reference Range & Units 04/03/24 11:41 04/16/24 06:42   Hepatitis B Core Wen Nonreactive   Nonreactive   Hepatitis B Surface Antibody Instrument Value <8.5 m[IU]/mL <3.50    Hepatitis B Surface Antibody  Nonreactive      A: Vascular access verified and assessed prior to treatment  T: Treatment was performed at a clinically appropriate time  ?: Patient was allowed to ask questions and address concerns prior to treatment  See Adult Hemodialysis flowsheet in [facility] for further details and post assessment.  Machine water alarm in place and functioning. Transducer pods intact and checked every 15min.   Pt returned via bed.  Chlorine/Chloramine water system checked every 4 hours.  Outpatient Dialysis at [facility]    Patient repositioned every 2 hours during the treatment.  Post treatment report given to ETHEL Doyle RN

## 2024-04-17 NOTE — PROGRESS NOTES
Renal Medicine Progress Note            Assessment/Plan:     # Patient with a creatinine of 0.45-0.9 mg/dl at baseline.      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               -dialysis dependent     # Ischemic RLE due to occluded bypass graft   -AKA due to unsalvagable limb 04/01/24     # FEN: 3+ pitting edema. Hyponatremia hypervolemia. Improving with UF.     # Anemia:                -EPO and Venofer with HD     Plan:  # 3 hrs HD. UF ~ 3.5 liters net as tolerates. Na 140. Qb 350.   # EPO 10K units and Venofer 100 mg.  # Next HD trx is on Friday.          Interval History:     Afebrile.   She is getting HD tx.  SBP is sustaining.  She is sleeping.   2-3+ edema         Medications and Allergies:     Current Facility-Administered Medications   Medication Dose Route Frequency Provider Last Rate Last Admin    - MEDICATION INSTRUCTIONS for Dialysis Patients -   Does not apply See Admin Instructions Marcin White MD        albuterol (PROVENTIL) neb solution 2.5 mg  2.5 mg Nebulization Q6H Marcin White MD   2.5 mg at 04/17/24 0725    amLODIPine (NORVASC) tablet 5 mg  5 mg Oral Daily Torrey Alegria MD   5 mg at 04/16/24 1325    betamethasone dipropionate (DIPROSONE) 0.05 % cream   Topical BID Marcin White MD   Given at 04/16/24 1944    carvedilol (COREG) tablet 6.25 mg  6.25 mg Oral BID w/meals Marcin White MD   6.25 mg at 04/16/24 1325    clopidogrel (PLAVIX) tablet 75 mg  75 mg Oral Daily Gala Morales DO   75 mg at 04/16/24 1312    Contraindications to both pharmacological and mechanical prophylaxis (must document contraindications for both in this order)   Does not apply See Admin Instructions Marcin White MD        famotidine (PEPCID) tablet 20 mg  20 mg Oral Q48H Sofia Cristobal MD        fluticasone-vilanterol (BREO ELLIPTA) 200-25 MCG/ACT inhaler 1 puff  1 puff Inhalation Daily Marcin White MD   1 puff at 04/16/24 1312    Rigo PACK 1 packet  1 packet Oral BID  "09 12 Gala Morales,    1 packet at 04/16/24 1313    lactobacillus rhamnosus (GG) (CULTURELL) capsule 1 capsule  1 capsule Oral BID Gala Morales DO   1 capsule at 04/16/24 1944    miconazole (MICATIN) 2 % powder   Topical BID Marcin White MD   Given at 04/16/24 1945    multivitamin RENAL (TRIPHROCAPS) capsule 1 capsule  1 capsule Oral Daily Gala Morales DO   1 capsule at 04/15/24 0857    nicotine (NICODERM CQ) 14 MG/24HR 24 hr patch 1 patch  1 patch Transdermal Daily Marcin White MD   1 patch at 04/16/24 1312    sodium chloride (PF) 0.9% PF flush 10-40 mL  10-40 mL Intracatheter Q8H Sofia Cristobal MD   20 mL at 04/17/24 0637        Allergies   Allergen Reactions    Pantoprazole      Protonix caused diffuse edema    Chantix [Varenicline]      Terrible dreams    Contrast Dye Swelling     Patient reports facial and throat swelling with prior CT contrast.    Gluten Meal GI Disturbance     Pt has celiac disease    Penicillins Itching and Rash            Physical Exam:   Vitals were reviewed   , Blood pressure 107/61, pulse 70, temperature 98.5  F (36.9  C), temperature source Oral, resp. rate 15, height 1.549 m (5' 1\"), weight 55.5 kg (122 lb 5.7 oz), SpO2 97%, not currently breastfeeding.    Wt Readings from Last 3 Encounters:   04/16/24 55.5 kg (122 lb 5.7 oz)   01/08/24 49.8 kg (109 lb 12.8 oz)   12/12/23 50.8 kg (112 lb)       Intake/Output Summary (Last 24 hours) at 4/17/2024 0955  Last data filed at 4/16/2024 1151  Gross per 24 hour   Intake 360 ml   Output 3359 ml   Net -2999 ml     GENERAL APPEARANCE: Frail.   RESP: lungs cta b c good efforts, no crackles, rhonchi or wheezes  CV: RRR  ABDOMEN: Soft, NT  EXTREMITIES/SKIN:R AKA. Improved. 2+ edema.   NEURO: sleeping  R TDC CDI         Data:     CBC RESULTS:     Recent Labs   Lab 04/17/24  0636 04/16/24  0642 04/15/24  0536 04/14/24  0542 04/13/24  0655 04/11/24  0643   WBC 8.5 8.7 8.0 7.9 6.0  6.0 5.0   RBC 2.85* 3.13* 2.98* 3.09* 2.29*  " 2.29* 2.87*   HGB 8.5* 9.2* 8.9* 9.2* 6.7*  6.7* 8.4*   HCT 25.9* 28.3* 26.4* 27.3* 20.2*  20.2* 24.6*    227 202 196 157  157 151       Basic Metabolic Panel:  Recent Labs   Lab 04/17/24  0636 04/17/24  0635 04/16/24  0642 04/15/24  2211 04/15/24  1635 04/15/24  1146 04/15/24  0536 04/14/24  2145 04/14/24  0538 04/13/24  1316 04/13/24  0655 04/12/24  0603 04/12/24  0553 04/11/24  0800 04/11/24  0643   *  --  128*  --   --   --  123*  --   --   --  130*  --  127*  --  128*   POTASSIUM 3.9  --  4.0  --   --   --  4.9  --  4.5  --  4.2  --  4.9  --  4.6  4.7   CHLORIDE 95*  --  93*  --   --   --  90*  --   --   --  95*  --  93*  --  93*   CO2 22  --  19*  --   --   --  23  --   --   --  25  --  21*  --  26   BUN 41.2*  --  48.0*  --   --   --  81.6*  --   --   --  52.4*  --  76.4*  --  45.9*   CR 2.01*  --  2.55*  --   --   --  3.50*  --   --   --  2.62*  --  3.17*  --  2.56*   GLC 82 77 81 85 82 87 84   < >  --    < > 69*   < > 60*   < > 74   SONIA 7.7*  --  7.7*  --   --   --  7.5*  --   --   --  7.2*  --  7.6*  --  7.4*    < > = values in this interval not displayed.       INR  Recent Labs   Lab 04/11/24  0643   INR 1.31*      Attestation:   I have reviewed today's relevant vital signs, notes, medications, labs and imaging.    Torrey Alegria MD  [facility]ph[facility]  Office phone :[phone]  Pager: [phone]

## 2024-04-17 NOTE — PROGRESS NOTES
[facility]  Hospitalist Progress Note        Tanner Maurer MD   04/17/2024        Interval History:        - Patient seen and examined in dialysis, reports some loose stools, likely due to supplements-- to continue gluten-free diet; will check for enteric panel/ C diff if persistent  - Nephrology following for severe LIMA: Likely BAILEY +/- rhabdo +/- ischemic injury with: No signs of recovery and now dialysis dependent; was hemodialysed on 4/16, getting another session on 4/17; next dialysis planned for 4/19/24; getting Epo and Venofer with HD per nephro  - care coordinator following for disposition to ARU  - on 1-2 lts O2  - Na 128---131; Cr peak 3.5---->2.01  - Hb stable around 8-9         Assessment and Plan:        Shirley Hendricks is a 63 year old female with PMH of  PAD/PVD, Tobacco use D/O, CAD (history of MI x3), HTN, HLP, DM2, Neuropathy, COPD, GERD, Anemia, Spongiotic dermatitis, MDD with anxiety, Chronic anticoagulation therapy with history of DVT/PE, OA, Charcot-Breonna-Tooth foot deformity, Marginal zone B-cell lymphoma, History of SBO, History of Takotsubo CM, History of SVT who presented with right leg pain and admitted on 3/29/2024 due to acute occlusion of right LE bypass graft.    She underwent emergent right above knee amputation per vascular surgery. Hospital course further complicated by likely contrast introduced acute kidney injury requiring initiation of hemodialysis.     Right common femoral artery to mid anterior tibial artery bypass graft acute occlusion  S/p Transarticular guillotine right through the knee amputation 4/1/24  S/p completion about the knee amputation right LE 4/9/24.  PAD/PVD  Hypertension  acute Blood Loss Anemia  Recent GI bleed (12/2023 and admitted at [facility])  - on admission, right arterial LE US revealed the right common femoral artery to mid anterior tibial artery bypass graft occlusion  - evaluated by vascular surgery and underwent emergent  procedure as noted above on 4/1/24 with completion above the knee amputation RLE on 4/9/24  - vascular surgery following  - to continue Plavix 75 mg daily; has been holding PTA Xarelto since admission, per vascular surgery-- xarelto not needed from vascular perspective post surgery as it was maintained for the graft  - Hb stable around 8-9; transfused intermittently with PRBCs (03/30 / 03/31 and 04/02 and 04/06 and 04/09 and 04/13)  - tansfuse as needed Hgb < 7.0; getting Epo and Venofer with HD per nephro  - PT / OT eval -> ARU; SW following for disposition    Rhabdomyolysis   Acute renal failure, likely contrast induced nephropathy- now hemodialysis dependent  Acute Hyponatremia  severe malnutrition  - Renal US 3/31 -> No obstruction demonstrated  - Nephrology following for severe LIMA: Likely BAILEY +/- rhabdo +/- ischemic injury with: No signs of recovery and now dialysis dependent; was hemodialysed on 4/16, getting another session on 4/17; next dialysis planned for 4/19/24; getting Epo and Venofer with HD per nephro  - Na 128---131; Cr peak 3.5---->2.01  -monitor BMP  - Avoid NSAIDs / nephrotoxins  -nutrition following; supplements per nutrition    CAD (history of MI x3)  HTN  HLP  History of Takotsubo CM  H/o SVT  *Last coronary angiogram completed 10/2023.    -Continue PTA Coreg 6.25 mg BID, hold lisinopril 10 mg/d due to LIMA, continue Norvasc 5 mg/d.  Hold parameters in place.    - hold PTA rosuvastatin 40 mg/d due to acute rhabdo  - hold Jardiance 10 mg/d For now given ARF  - PRN IV hydralazine 10 mg every 4 hours for SBP > 180.       *Recovered EF (55-60%) from ECHO 11/2023.       Chronic anticoagulation therapy with history of DVT/PE  - discussion on anticoagulation as above ; PTA Xarelto currently on hold.      Colon distention  Loose stools  H/o Celiac disease   - CT abdomen 04/03 -> Medium-sized right retroperitoneal and extraperitoneal hematoma. Evaluation for extravasation is unable to be performed  without contrast. This is likely similar to slightly smaller than the CT lumbar spine although this is incompletely visualized at that time  Extensive pulmonary opacities.  - CT also noted fluid-filled distention of the small large bowel can be seen in diarrheal state. No obstruction  - has been having some loose stools, likely due to supplements-- to continue gluten-free diet; will check for enteric panel/ C diff if persistent  - Per care everywhere - follows with [facility] and saw Dr. Hoffman who diagnosed her with Celiac disease with testing March 2024     MDD with anxiety  Acute Delirium-- resolved  Contrast dye allergy-- got solumedrol and benadryl per contrast dye allergy protocol.       Tobacco Use D/O   Patient currently smokes max 5 cigarettes per day.     - Counseled regarding smoking cessation that should also continue with PCP.    - Nicoderm patch ordered.    Type 2 DM  Diabetic neuropathy  *Noted diagnosis on chart review.  However, A1c of 5.2%.    - Hold PTA Jardiance 10 mg/d and gabapentin 100 mg TID due to acute renal failure  -blood glucose has been stable in 80s to 90s without need for any insulin     COPD   - continue PTA inhalers.    - Encourage use of Flutter valve/IS.       GERD  -continue Pepcid bid       Spongiotic dermatitis  *Recently seen at outpatient Derm.    - Resumed on PTA betamethasone cream BID.      OA  - Pain management as above.       Charcot-Breonna-Tooth foot deformity  *Noted on chart review.  No interventions.      Stage VALENTIN marginal zone B-cell lymphoma  *Follows with [facility] (Dr. Barrow).    - Continue outpatient surveillance (CT scan in 8/2024).         DVT Prophylaxis: xarelto on hold, to resume per vascular surgery      Cardiac Monitoring: None  Code Status: Full Code      Diet:   Snacks/Supplements Adult: Ensure Enlive; With Meals  Fluid restriction 1200 ML FLUID  Snacks/Supplements Adult: Ensure Enlive; With Meals  Gluten Free Diet        Disposition:    Medically  "Ready for Discharge: Anticipated in 2-4 Days  - pending nephrology and vascular surgery clearance  -needs acute rehab; care coordinator following for disposition    Clinically Significant Risk Factors         # Hyponatremia: Lowest Na = 128 mmol/L in last 2 days, will monitor as appropriate    # Hypomagnesemia: Lowest Mg = 1.6 mg/dL in last 2 days, will replace as needed   # Hypoalbuminemia: Lowest albumin = 1.6 g/dL at 4/15/2024  5:36 AM, will monitor as appropriate         # Hypertension: Noted on problem list           # Severe Malnutrition: based on nutrition assessment      # Financial/Environmental Concerns:                Page Me (7 am to 6 pm)    Care plan discussed with patient and nursing; also discussed with Dr. Lozano from vascular surgery.    High medical complexity    60 minutes spent in total time today on my first encounter with this patient with complicated hospitalization              Physical Exam:      Blood pressure 120/50, pulse 72, temperature 98.6  F (37  C), temperature source Oral, resp. rate 16, height 1.549 m (5' 1\"), weight 55.5 kg (122 lb 5.7 oz), SpO2 (!) 87%, not currently breastfeeding.  Vitals:    04/14/24 0544 04/15/24 0619 04/16/24 0605   Weight: 60.2 kg (132 lb 11.5 oz) 58.2 kg (128 lb 4.9 oz) 55.5 kg (122 lb 5.7 oz)     Vital Signs with Ranges  Temp:  [98.3  F (36.8  C)-99.1  F (37.3  C)] 98.6  F (37  C)  Pulse:  [64-82] 72  Resp:  [11-22] 16  BP: (105-158)/(50-80) 120/50  SpO2:  [87 %-100 %] 87 %  I/O's Last 24 hours  I/O last 3 completed shifts:  In: 610 [P.O.:610]  Out: 3359 [Other:3359]    Constitutional: Alert, awake and oriented ; resting comfortably in no apparent distress; appears frail    right dialysis catheter in place   Oral cavity: Moist mucosa   Cardiovascular: Normal s1 s2, regular rate and rhythm, no murmur   Lungs: B/l clear to auscultation, no wheezes or crepitations   Abdomen: Soft, nt, nd, no guarding, rigidity or rebound; BS +   LE : LLE edema ++ "   Musculoskeletal/Neuro Right AKA stump in dressing   Psychiatry: normal mood and affect                Medications:        Current Facility-Administered Medications   Medication Dose Route Frequency Provider Last Rate Last Admin    - MEDICATION INSTRUCTIONS for Dialysis Patients -   Does not apply See Admin Instructions Marcin White MD        albuterol (PROVENTIL) neb solution 2.5 mg  2.5 mg Nebulization Q6H Marcin White MD   2.5 mg at 04/17/24 0725    amLODIPine (NORVASC) tablet 5 mg  5 mg Oral Daily Torrey Alegria MD   5 mg at 04/16/24 1325    betamethasone dipropionate (DIPROSONE) 0.05 % cream   Topical BID Marcin White MD   Given at 04/16/24 1944    carvedilol (COREG) tablet 6.25 mg  6.25 mg Oral BID w/meals Marcin White MD   6.25 mg at 04/16/24 1325    clopidogrel (PLAVIX) tablet 75 mg  75 mg Oral Daily Gala Morales DO   75 mg at 04/16/24 1312    Contraindications to both pharmacological and mechanical prophylaxis (must document contraindications for both in this order)   Does not apply See Admin Instructions Marcin White MD        epoetin mireya-epbx (RETACRIT) injection 10,000 Units  10,000 Units Intravenous Once Torrey Alegria MD        famotidine (PEPCID) tablet 20 mg  20 mg Oral Q48H Sofia Cristobal MD        fluticasone-vilanterol (BREO ELLIPTA) 200-25 MCG/ACT inhaler 1 puff  1 puff Inhalation Daily Marcin White MD   1 puff at 04/16/24 1312    iron sucrose (VENOFER) injection 100 mg  100 mg Intravenous Once in dialysis/CRRT Torrey Alegria MD        Rigo PACK 1 packet  1 packet Oral BID 09 12 Gala Morales, DO   1 packet at 04/16/24 1313    lactobacillus rhamnosus (GG) (CULTURELL) capsule 1 capsule  1 capsule Oral BID Gala Morales, DO   1 capsule at 04/16/24 1944    miconazole (MICATIN) 2 % powder   Topical BID Marcin White MD   Given at 04/16/24 1945    multivitamin RENAL (TRIPHROCAPS) capsule 1 capsule  1 capsule Oral Daily Gala Morales DO   1 capsule at 04/15/24 0857     nicotine (NICODERM CQ) 14 MG/24HR 24 hr patch 1 patch  1 patch Transdermal Daily Marcin White MD   1 patch at 04/16/24 1312    No heparin via hemodialysis machine   Does not apply Once Torrey Aelgria MD        sodium chloride (PF) 0.9% PF flush 10-40 mL  10-40 mL Intracatheter Q8H Sofia Cristobal MD   20 mL at 04/17/24 0637    sodium chloride 0.9% BOLUS 250 mL  250 mL Intravenous Once in dialysis/CRRT Torrey Alegria MD        sodium chloride 0.9% BOLUS 300 mL  300 mL Hemodialysis Machine Once Torrey Alegria MD         PRN Meds:   Current Facility-Administered Medications   Medication Dose Route Frequency Provider Last Rate Last Admin    acetaminophen (TYLENOL) tablet 650 mg  650 mg Oral Q4H PRN Marcin White MD   650 mg at 04/16/24 1944    albumin human 25 % injection 50 mL  50 mL Intravenous Q1H PRN Torrey Alegria MD        albumin human 5 % injection  mL   mL Intravenous Q1H PRN Torrey Alegria MD        dextrose 10% infusion   Intravenous Continuous PRN Marcin White MD        glucose gel 15-30 g  15-30 g Oral Q15 Min PRN Marcin White MD   15 g at 04/12/24 1735    Or    dextrose 50 % injection 25-50 mL  25-50 mL Intravenous Q15 Min PRN Marcin White MD   25 mL at 04/03/24 1648    Or    glucagon injection 1 mg  1 mg Subcutaneous Q15 Min PRN Marcin White MD        diphenoxylate-atropine (LOMOTIL) 2.5-0.025 MG per tablet 1 tablet  1 tablet Oral 4x Daily PRN Gala Morales DO   1 tablet at 04/14/24 1154    hydrALAZINE (APRESOLINE) tablet 25 mg  25 mg Oral 4x Daily PRN Shayy Law MD        HYDROmorphone (DILAUDID) injection 0.2 mg  0.2 mg Intravenous Q2H PRN Gala Lo MD        Or    HYDROmorphone (DILAUDID) injection 0.4 mg  0.4 mg Intravenous Q2H PRN Gala Lo MD   0.4 mg at 04/10/24 1419    HYDROmorphone (DILAUDID) tablet 2 mg  2 mg Oral Q4H PRN Marcin White MD   2 mg at 04/11/24 0204    HYDROmorphone (DILAUDID) tablet 4 mg  4 mg Oral Q4H PRN Cindy,  MD Marcin   4 mg at 04/16/24 1944    lidocaine (LMX4) cream   Topical Q1H PRN Kaylee Liu, NP        lidocaine (LMX4) cream   Topical Q1H PRN Marcin White MD        lidocaine 1 % 0.1-1 mL  0.1-1 mL Other Q1H PRN Kaylee Liu, ANNE        lidocaine 1 % 0.1-1 mL  0.1-1 mL Other Q1H PRN Marcin White MD        metoclopramide (REGLAN) tablet 5 mg  5 mg Oral Q6H PRN Marcin White MD        Or    metoclopramide (REGLAN) injection 5 mg  5 mg Intravenous Q6H PRN Marcin White MD        naloxone (NARCAN) injection 0.2 mg  0.2 mg Intravenous Q2 Min PRN Marcin White MD        Or    naloxone (NARCAN) injection 0.4 mg  0.4 mg Intravenous Q2 Min PRN Marcin White MD        Or    naloxone (NARCAN) injection 0.2 mg  0.2 mg Intramuscular Q2 Min PRN Marcin White MD        Or    naloxone (NARCAN) injection 0.4 mg  0.4 mg Intramuscular Q2 Min PRN Marcin White MD        OLANZapine (zyPREXA) injection 5 mg  5 mg Intramuscular Daily PRN Marcin White MD        ondansetron (ZOFRAN ODT) ODT tab 4 mg  4 mg Oral Q6H PRN Marcin White MD   4 mg at 04/08/24 2338    Or    ondansetron (ZOFRAN) injection 4 mg  4 mg Intravenous Q6H PRN Marcin White MD   4 mg at 04/16/24 2114    polyethylene glycol (MIRALAX) Packet 17 g  17 g Oral Daily PRN Marcin White MD        Reason statin not prescribed   Does not apply DOES NOT GO TO Marcin Gomes MD        sodium chloride (PF) 0.9% PF flush 10-20 mL  10-20 mL Intracatheter q1 min prn Sofia Cristobal MD   20 mL at 04/10/24 0558    sodium chloride (PF) 0.9% PF flush 10-40 mL  10-40 mL Intracatheter Q1H PRN Sofia Cristobal MD   20 mL at 04/09/24 1831    sodium chloride (PF) 0.9% PF flush 3 mL  3 mL Intracatheter q1 min prn Kaylee Liu NP        sodium chloride 0.9% BOLUS 1-250 mL  1-250 mL Intravenous Q1H PRN Marcin White MD        sodium chloride 0.9% BOLUS 1-250 mL  1-250 mL Intravenous Q1H PRN Marcin White MD        sodium chloride 0.9% BOLUS 1-250 mL  1-250 mL Intravenous Q1H PRN Cindy,  MD Marcin        sodium chloride 0.9% BOLUS 1-250 mL  1-250 mL Intravenous Q1H PRMarcin Pablo MD        sodium chloride 0.9% BOLUS 100-150 mL  100-150 mL Intravenous Q15 Min PRN Torrey Alegria MD                Data:      All new lab and imaging data was reviewed.   Recent Labs   Lab Test 04/17/24  0636 04/16/24  0642 04/15/24  0536 04/13/24  0655 04/11/24  0643 10/07/23  1704 10/07/23  0516 10/06/23  1928 10/06/23  0551   WBC 8.5 8.7 8.0   < > 5.0   < > 7.7   < > 9.3   HGB 8.5* 9.2* 8.9*   < > 8.4*   < > 10.0*   < > 10.8*   MCV 91 90 89   < > 86   < > 87   < > 87    227 202   < > 151   < > 120*   < > 177   INR  --   --   --   --  1.31*  --  1.38*  --  1.08    < > = values in this interval not displayed.      Recent Labs   Lab Test 04/17/24  0636 04/17/24  0635 04/16/24  0642 04/15/24  1146 04/15/24  0536   *  --  128*  --  123*   POTASSIUM 3.9  --  4.0  --  4.9   CHLORIDE 95*  --  93*  --  90*   CO2 22  --  19*  --  23   BUN 41.2*  --  48.0*  --  81.6*   CR 2.01*  --  2.55*  --  3.50*   ANIONGAP 14  --  16*  --  10   SONIA 7.7*  --  7.7*  --  7.5*   GLC 82 77 81   < > 84    < > = values in this interval not displayed.     Recent Labs   Lab Test 06/10/20  1243 02/16/20  1824 09/18/19  0739   TROPI <0.015 <0.015 <0.015

## 2024-04-17 NOTE — CONSULTS
"[facility] - Consult Note    Gen Surg    Referral Source: Consult   Saw pt. Shirley Hendricks. She was feeling \"nauseous\" after dialysis and not up for a conversation but did request a prayer for healing and sustenance and care, which I provided. We agreed that I would check back in on Friday, if she is still inpatient.    Plan: [facility] will continue to follow this patient while she is on the unit.      Liset Orozco  Chaplain Resident    [facility] routine referrals *[phone]    [facility] available 24/7 for emergent requests/referrals, either by paging the on-call  or by entering an ASAP/STAT consult in Epic (this will also page the on-call ).    "

## 2024-04-18 ENCOUNTER — APPOINTMENT (OUTPATIENT)
Dept: PHYSICAL THERAPY | Facility: CLINIC | Age: 64
DRG: 270 | End: 2024-04-18
Payer: COMMERCIAL

## 2024-04-18 ENCOUNTER — APPOINTMENT (OUTPATIENT)
Dept: OCCUPATIONAL THERAPY | Facility: CLINIC | Age: 64
DRG: 270 | End: 2024-04-18
Payer: COMMERCIAL

## 2024-04-18 LAB
GLUCOSE BLDC GLUCOMTR-MCNC: 81 MG/DL (ref 70–99)
GLUCOSE BLDC GLUCOMTR-MCNC: 81 MG/DL (ref 70–99)
GLUCOSE BLDC GLUCOMTR-MCNC: 92 MG/DL (ref 70–99)
HGB BLD-MCNC: 8.2 G/DL (ref 11.7–15.7)
MAGNESIUM SERPL-MCNC: 1.6 MG/DL (ref 1.7–2.3)
PHOSPHATE SERPL-MCNC: 2.9 MG/DL (ref 2.5–4.5)
POTASSIUM SERPL-SCNC: 3.8 MMOL/L (ref 3.4–5.3)

## 2024-04-18 PROCEDURE — 250N000009 HC RX 250: Performed by: STUDENT IN AN ORGANIZED HEALTH CARE EDUCATION/TRAINING PROGRAM

## 2024-04-18 PROCEDURE — 250N000013 HC RX MED GY IP 250 OP 250 PS 637: Performed by: STUDENT IN AN ORGANIZED HEALTH CARE EDUCATION/TRAINING PROGRAM

## 2024-04-18 PROCEDURE — 120N000001 HC R&B MED SURG/OB

## 2024-04-18 PROCEDURE — 250N000011 HC RX IP 250 OP 636: Performed by: STUDENT IN AN ORGANIZED HEALTH CARE EDUCATION/TRAINING PROGRAM

## 2024-04-18 PROCEDURE — 83735 ASSAY OF MAGNESIUM: CPT | Performed by: HOSPITALIST

## 2024-04-18 PROCEDURE — 999N000157 HC STATISTIC RCP TIME EA 10 MIN

## 2024-04-18 PROCEDURE — 84100 ASSAY OF PHOSPHORUS: CPT | Performed by: HOSPITALIST

## 2024-04-18 PROCEDURE — 84132 ASSAY OF SERUM POTASSIUM: CPT | Performed by: HOSPITALIST

## 2024-04-18 PROCEDURE — 97530 THERAPEUTIC ACTIVITIES: CPT | Mod: GP | Performed by: PHYSICAL THERAPIST

## 2024-04-18 PROCEDURE — 250N000009 HC RX 250: Performed by: HOSPITALIST

## 2024-04-18 PROCEDURE — 97530 THERAPEUTIC ACTIVITIES: CPT | Mod: GO

## 2024-04-18 PROCEDURE — 99233 SBSQ HOSP IP/OBS HIGH 50: CPT | Performed by: HOSPITALIST

## 2024-04-18 PROCEDURE — 250N000013 HC RX MED GY IP 250 OP 250 PS 637: Performed by: INTERNAL MEDICINE

## 2024-04-18 PROCEDURE — 94640 AIRWAY INHALATION TREATMENT: CPT

## 2024-04-18 PROCEDURE — 85018 HEMOGLOBIN: CPT | Performed by: HOSPITALIST

## 2024-04-18 RX ORDER — SCOLOPAMINE TRANSDERMAL SYSTEM 1 MG/1
1 PATCH, EXTENDED RELEASE TRANSDERMAL
Status: DISCONTINUED | OUTPATIENT
Start: 2024-04-18 | End: 2024-04-22 | Stop reason: HOSPADM

## 2024-04-18 RX ORDER — PROCHLORPERAZINE MALEATE 5 MG
5 TABLET ORAL EVERY 6 HOURS PRN
Status: DISCONTINUED | OUTPATIENT
Start: 2024-04-18 | End: 2024-04-22 | Stop reason: HOSPADM

## 2024-04-18 RX ORDER — PROCHLORPERAZINE 25 MG
12.5 SUPPOSITORY, RECTAL RECTAL EVERY 12 HOURS PRN
Status: DISCONTINUED | OUTPATIENT
Start: 2024-04-18 | End: 2024-04-22 | Stop reason: HOSPADM

## 2024-04-18 RX ADMIN — ALBUTEROL SULFATE 2.5 MG: 2.5 SOLUTION RESPIRATORY (INHALATION) at 07:09

## 2024-04-18 RX ADMIN — SCOPALAMINE 1 PATCH: 1 PATCH, EXTENDED RELEASE TRANSDERMAL at 12:48

## 2024-04-18 RX ADMIN — HYDROMORPHONE HYDROCHLORIDE 4 MG: 2 TABLET ORAL at 16:59

## 2024-04-18 RX ADMIN — ONDANSETRON 4 MG: 2 INJECTION INTRAMUSCULAR; INTRAVENOUS at 21:10

## 2024-04-18 RX ADMIN — Medication 1 CAPSULE: at 20:58

## 2024-04-18 RX ADMIN — NICOTINE 1 PATCH: 14 PATCH, EXTENDED RELEASE TRANSDERMAL at 08:34

## 2024-04-18 RX ADMIN — HYDROMORPHONE HYDROCHLORIDE 4 MG: 2 TABLET ORAL at 08:18

## 2024-04-18 RX ADMIN — HEPARIN SODIUM 5000 UNITS: 5000 INJECTION, SOLUTION INTRAVENOUS; SUBCUTANEOUS at 19:55

## 2024-04-18 RX ADMIN — CARVEDILOL 6.25 MG: 6.25 TABLET, FILM COATED ORAL at 18:48

## 2024-04-18 RX ADMIN — ONDANSETRON 4 MG: 4 TABLET, ORALLY DISINTEGRATING ORAL at 08:18

## 2024-04-18 RX ADMIN — Medication 1 CAPSULE: at 08:18

## 2024-04-18 RX ADMIN — MICONAZOLE NITRATE: 2 POWDER TOPICAL at 08:34

## 2024-04-18 RX ADMIN — HYDROMORPHONE HYDROCHLORIDE 4 MG: 2 TABLET ORAL at 21:03

## 2024-04-18 RX ADMIN — ACETAMINOPHEN 650 MG: 325 TABLET, FILM COATED ORAL at 08:18

## 2024-04-18 RX ADMIN — HEPARIN SODIUM 5000 UNITS: 5000 INJECTION, SOLUTION INTRAVENOUS; SUBCUTANEOUS at 12:52

## 2024-04-18 RX ADMIN — CLOPIDOGREL BISULFATE 75 MG: 75 TABLET ORAL at 08:18

## 2024-04-18 RX ADMIN — HEPARIN SODIUM 5000 UNITS: 5000 INJECTION, SOLUTION INTRAVENOUS; SUBCUTANEOUS at 02:32

## 2024-04-18 RX ADMIN — MICONAZOLE NITRATE: 2 POWDER TOPICAL at 21:04

## 2024-04-18 RX ADMIN — AMLODIPINE BESYLATE 5 MG: 5 TABLET ORAL at 08:18

## 2024-04-18 RX ADMIN — ONDANSETRON 4 MG: 2 INJECTION INTRAMUSCULAR; INTRAVENOUS at 08:28

## 2024-04-18 RX ADMIN — CARVEDILOL 6.25 MG: 6.25 TABLET, FILM COATED ORAL at 08:18

## 2024-04-18 RX ADMIN — DIPHENOXYLATE HYDROCHLORIDE AND ATROPINE SULFATE 1 TABLET: 2.5; .025 TABLET ORAL at 20:58

## 2024-04-18 RX ADMIN — ACETAMINOPHEN 650 MG: 325 TABLET, FILM COATED ORAL at 21:03

## 2024-04-18 RX ADMIN — ONDANSETRON 4 MG: 4 TABLET, ORALLY DISINTEGRATING ORAL at 08:25

## 2024-04-18 RX ADMIN — ONDANSETRON 4 MG: 2 INJECTION INTRAMUSCULAR; INTRAVENOUS at 14:56

## 2024-04-18 RX ADMIN — FAMOTIDINE 20 MG: 20 TABLET, FILM COATED ORAL at 16:59

## 2024-04-18 ASSESSMENT — ACTIVITIES OF DAILY LIVING (ADL)
ADLS_ACUITY_SCORE: 34
ADLS_ACUITY_SCORE: 39
ADLS_ACUITY_SCORE: 34
ADLS_ACUITY_SCORE: 39
ADLS_ACUITY_SCORE: 34
ADLS_ACUITY_SCORE: 35
ADLS_ACUITY_SCORE: 35
ADLS_ACUITY_SCORE: 34
ADLS_ACUITY_SCORE: 35
ADLS_ACUITY_SCORE: 34
ADLS_ACUITY_SCORE: 39
ADLS_ACUITY_SCORE: 34
ADLS_ACUITY_SCORE: 34
ADLS_ACUITY_SCORE: 39
ADLS_ACUITY_SCORE: 34
ADLS_ACUITY_SCORE: 34
ADLS_ACUITY_SCORE: 39
ADLS_ACUITY_SCORE: 34

## 2024-04-18 NOTE — PLAN OF CARE
Surgery/POD#: POD #17 s/p R AKA  Orientation: A&Ox4  ABNL VS/O2: VSS on 1L when sleeping  ABNL Labs: Creat 2.01, Na 131, Hgb 8.5  Pain Management: PRN PO Dilaudid & Tylenol available  Bowel/Bladder: Small loose BM x1 overnight  Drains: PICC SL. HD port HL.  Wounds/incisions: R stump dressing CDI  Diet: Gluten free diet, 1200 ml fluid restriction  Activity Level: Lift. Encouraged weight shifting in bed.  Tests/Procedures: Dialysis planned for tomorrow 4/19  Anticipated  DC Date: ARU pending  Significant Information: Denied nausea overnight.

## 2024-04-18 NOTE — INTERIM SUMMARY
[facility] Pre-Admission Screen    [facility]  Admit date to referring facility: 3/29/2024    Physical Medicine and Rehab Consult Completed: No    Rehab Diagnosis:    Amputation 05.3 Unilateral LE AKA; emergent R AKA due to acute occlusion of RLE bypass graft    Justification for Acute Inpatient Rehabilitation  Shirley Hendricks is a 63 year old female with PMH of  PAD/PVD, Tobacco use D/O, CAD (history of MI x3), HTN, HLP, DM2, Neuropathy, COPD, GERD, Anemia, Spongiotic dermatitis, MDD with anxiety, Chronic anticoagulation therapy with history of DVT/PE, OA, Charcot-Breonna-Tooth foot deformity, Marginal zone B-cell lymphoma, History of SBO, History of Takotsubo CM, History of SVT who presented with right leg pain and admitted on 3/29/2024 due to acute occlusion of right LE bypass graft. She underwent emergent right above knee amputation per vascular surgery. Hospital course further complicated by likely contrast introduced acute kidney injury requiring initiation of hemodialysis as well as nausea and vomiting, rhabdomyolysis, B pleural effusions, and anemia. Pt is now stable for transfer to inpatient rehab for intensive therapies post R AKA.     Shirley meets criteria for inpatient acute rehab, with need for intensive PT and OT, accompanying close medical management and 24 hour rehabilitative nursing cares. 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 Currently, she is needing assist of 2 for all mobility and needs significant assistance with all ADL. Patient requires an intensive inpatient rehab program to address the following acute impairments post R AKA: dysphagia, impaired activity tolerance, impaired balance, impaired strength, impaired weight shifting, and pain. Pt requires education on post-op care of R AKA and preparation for eventual RLE  prosthesis. She is motivated for rehab and plans to have family support and home care therapies after discharge from rehab.     Current Active Medical Management Needs/Risks for Clinical Complications  The patient requires the high level of rehabilitation physician supervision that accompanies the provision of intensive rehabilitation therapy.  The patient needs the services of the rehabilitation physician at least 3 times per week to assess the patient medically and functionally and to modify the course of treatment as needed to maximize the patient's capacity to benefit from the rehabilitation process.    R AKA: Pt is at risk for phantom limb pain and other uncontrolled pain, falls with injury, post-op wound infection, hip joint contracture, permanent loss of function and decreased quality of life. Promote acceptance of altered body image and coping with lifestyle changes. Provided ongoing education regarding edema management, residual limb shaping, prosthetics and adaptive equipment as indicated.  Continue daily dressing changes with xeroform, gauze and kerlix. Monitor for signs of infection. Encourage repositioning and residual limb hip flexor stretching to prevent contracture.   LIMA with new need for dialysis: Pt is at risk of worsening renal function, electrolyte and fluid imbalance. Electrolyte management per Nephrology. Monitor BMP intermittently with dialysis. HD to continue TThS. Monitor urine output and for signs of renal recovery.  Avoid NSAIDs / nephrotoxins. 1200 ml fluid restriction  Cardiac: Pt is at risk of uncontrolled BP, cardiomyopathy decompensation, arrhythmias, and cardiovascular events. Continue Norvasc, Coreg with hold parameters in place. Goal SBP < 180. Lisinopril on hold due to LIMA. Hold PTA rosuvastatin 40 mg/d due to acute rhabdo, hold Jardiance 10 mg/d For now given ARF.   Anticoagulation: Pt is at risk of unwanted clotting, DVT, PE. Use Plavix, Subcutaneous heparin Q8. Xarelto  "discontinued.  Diabetes Type II: Pt is at risk of uncontrolled BG and poor wound healing. A1c of 5.2%. Hold PTA Jardiance 10 mg/d due to acute renal failure. Blood glucose has been stable in 80s to 90s without need for any insulin. Continue to monitor with BG checks and educate on diabetic diet.  Respiratory: Pt is at risk for respiratory decline with COPD. B pleural effusion noted on 4/20. Satting well on RA. However has intermittently required 1-2 LPM via NC. . Continue to monitor symptoms. Continue Albuterol nebulizers. Consider thoracentesis if symptomatic or has worsening O2 needs.   GI/Bowel management: Pt is at risk for malnutrition, continued GI upset with vomitting, nausea and loose stools. Continue Lomotil, oral Zofran, scopolamine patch. Pt is non-compliant with Gluten-free diet due to Celiac disease and needs continued education on importance of this. Oral intake has been poor due to n/v. Monitor caloric intake. Pt is to follow up with [facility] as an OP for potential proctocolitis.   Acute pain: Pt is at risk for uncontrolled pain. Currently using Tylenol, Dilaudid. Gabapentin on hold due to renal failure. Assess for other needs as pt begins intensive rehab.   Anemia: Pt is at risk for lightheadedness/dizziness, falls. On 4/20, noted drift in Hb down to 7.4, repeat Hb 4/20 noted better at 8.6 without any transfusion.  Monitor hemoglobin intermittently and transfuse prn for Hb <7. Getting Epo and Venofer with HD per nephro   Mental Health: Pt is at risk for depression and anxiety given sudden limb loss and drastic change in health needing new dialysis. Family also reports a \"toxic home life\" with concerns for pt's relationship with her spouse who is an alcoholic and has now moved out to live with his sister. Promote effective coping strategies, sleep hygiene. Consult Health Psych.   Tobacco use:  regarding smoking cessation. Nicoderm patch ordered.    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 All needs met one floor. Family lives within home w/ pt.    SELF-CARE  Usual Activity Tolerance: good  Regular Exercise: No  Equipment Currently Used at Home: walker, standard  Activity/Exercise/Self-Care Comment: Pt reports being IND w/ all ADL's at baseline prior to admission. 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    Additional Comments: Pt's spouse normally lives with her but has gone to stay with his sister as he needs care himself.    Level of Function: GG Scale (Section GG Functional Ability and Goals; CMS's CASTILLO Version 3.0 Manual effective 10.1.2019):  PT Current Function Goals for Rehab   Bed Rolling 3 Partial/moderate assistance 6 Independent   Supine to Sit 3 Partial/moderate assistance 6 Independent   Sit to Stand 1 Dependent 6 Independent   Transfer 1 Dependent 6 Independent   Ambulation 88 Not attempted due to safety 4 Supervision or touching assitance   Stairs 88 Not attempted due to safety 3 Partial/moderate assistance     OT Current Function Goals for Rehab   Feeding 4 Supervision or touching assitance 6 Independent   Grooming 4 Supervision or touching assitance (in bed) 6 Independent   Bathing Not completed 3 Partial/moderate assistance   Upper Body Dressing 4 Supervision or touching assitance (in bed after set up) 6 Independent   Lower Body Dressing 2 Substantial/maximal assistance 6 Independent   Toileting 2 Substantial/maximal assistance 6 Independent    Toilet Transfer 1 Dependent 6 Independent   Tub/Shower Transfer 88 Not attempted due to safety 4 Supervision or touching assitance   Cognition Not Assessed Independent     SLP Current Function Goals for Rehab   Swallow Impaired Tolerate least restrictive diet without signs & symptoms of aspiration and adhere to safe swallow strategies   Communication Not Impaired Not applicable       Current Diet:  0-Thin, 7-Regular, Diabetic, Gluten Free, and Renal (Pt non-compliant with gluten-free diet)    Summary Statement:   Pt is a 63 year old who was previously independent, living in her own home and was a caregiver for her spouse. Now she is s/p emergent R AKA and is significant below her functional baseline with need for intensive rehab and close medical management needs. Pt needs Min A of 2 and Ailin Steady for standing, and assist of 2 with Ailin Steady vs mechanical lift for transfers. Significant assistance needed for all ADL that involves mobility or lower body. SLP following pt for mild dysphagia. Pt is tolerating up to 45 min sessions, is motivated and has excellent prognosis for stated rehab goals. Pt is expected to discharge home with family support and DME with home therapies. Eventual OP therapy and Prosthetics    Expected Therapies and Services Required During Inpatient Rehab Admission  Intensity of Therapy: Patient requires intensive therapies not available in a lesser level of care. Patient is motivated, making gains, and can tolerate 3 hours of therapy a day.  Physical Therapy: 75 minutes per day, 6 days a week for 21 days  Occupational Therapy: 75 minutes per day, 6 days a week for 21 days  Speech and Language Therapy: 30 minutes per day, 6 days a week for 21 days  Rehabilitation Nursing Needs: Patient requires 24 hour Rehab Nursing to manage bowel program, bladder program, vitals, medication education, positioning, carryover of new rehab techniques, care coordination, skin integrity, blood sugar management,  diabetes education, pain management, post-surgical incision care to promote healing and prevent infection, provide safe environment for patient at falls risk, and monitor nutritional intake.    Precautions/restrictions/special needs:   Precautions: fall precautions and Weight bearing precautions (RLE NWB)   Restrictions: NA   Special Needs: lift and hemodialysis    Expected Level of Improvement: Anticipate pt to progress to Mod (I) with ADL, transfers, and wheelchair based mobility within her home, with the exception of needing minimum assistance for bathing and stairs. Anticipate pt could progress to very short distance gait with a walker and SBA. Expect pt to need assistance with IADL/heavy chores.  Expected Length of time to achieve: 21 days    Anticipated Discharge Needs:  Anticipated Discharge Destination: Home  Anticipated Discharge Support: Family member  24/7 support available : Unknown  Identified caregiver(s):  Lives with brother and son. Daughter, sister do not live with pt but are very supportive and involved.  Anticipated Discharge Needs: Home with homecare and Dialysis (has been referred to [facility] for OP HD)    Identified challenges/barriers:  May need a ramp installed at home entrance (3 GINETTE)    Liaison signature/date/time:    Physician statement of review and agreement:  I have reviewed and am in agreement of the need for IRF stay to address above functional and medical needs. In addition to above statements address, Patient requires intensive active and ongoing therapeutic intervention and multiple therapies; Patient requires medical supervision; Expected to actively participate in the intensive rehab program; Sufficiently stable to actively participate; Expectation for measurable improvement in functional capacity or adaption to impairments.    MD signature/date/time:

## 2024-04-18 NOTE — PROGRESS NOTES
"[facility]  Hospitalist Progress Note        Tanner Maurer MD   04/18/2024        Interval History:        - per vascular, patient has had no recent DVT or PE--thus anticoagulation beside antiplatelet not indicated; will continue to hold Xarelto and discontinue on discharge   - patient is not happy with \"gluten free diet\" and had questions about her \"Celiac\" diagnosis  - reviewing the chart it seems on 3/19, she had a pill cam study with [facility] which revealed mild non-erosive red tissue in the duodenum, some atrophic type appearance that could be celiac disease  - this was followed by celiac labs on 3/20/24 (Gliadin ab and tTG IgA antibody) which were positive and thus Celiac diagnosis was established and she was recommended for gluten-free diet  - however, patient with nausea and very poor PO intake and declines Gluten free diet; will switch to regular dialysis diet; suggested to consider Gluten free diet once appetite improves  - will order Scopolamine patch for nausea; prn antiemetics  - continue with some loose stools, though improving, with no fever or pain abdomen; likely related to nutritional supplements; no clinical concern for C diff at this time         Assessment and Plan:        Shirley Hendricks is a 63 year old female with PMH of  PAD/PVD, Tobacco use D/O, CAD (history of MI x3), HTN, HLP, DM2, Neuropathy, COPD, GERD, Anemia, Spongiotic dermatitis, MDD with anxiety, Chronic anticoagulation therapy with history of DVT/PE, OA, Charcot-Breonna-Tooth foot deformity, Marginal zone B-cell lymphoma, History of SBO, History of Takotsubo CM, History of SVT who presented with right leg pain and admitted on 3/29/2024 due to acute occlusion of right LE bypass graft.    She underwent emergent right above knee amputation per vascular surgery. Hospital course further complicated by likely contrast introduced acute kidney injury requiring initiation of hemodialysis.     Right common femoral " artery to mid anterior tibial artery bypass graft acute occlusion  S/p Transarticular guillotine right through the knee amputation 4/1/24  S/p completion about the knee amputation right LE 4/9/24.  PAD/PVD  Hypertension  acute Blood Loss Anemia  Recent GI bleed (12/2023 and admitted at [facility])  - on admission, right arterial LE US revealed the right common femoral artery to mid anterior tibial artery bypass graft occlusion  - evaluated by vascular surgery and underwent emergent procedure as noted above on 4/1/24 with completion above the knee amputation RLE on 4/9/24  - vascular surgery following  - to continue Plavix 75 mg daily; per vascular, patient has had no recent DVT or PE and Xarelto was maintained for the graft--thus anticoagulation beside antiplatelet not indicated; will continue to hold Xarelto and discontinue on discharge   - Hb stable around 8-9; transfused intermittently with PRBCs (03/30 / 03/31 and 04/02 and 04/06 and 04/09 and 04/13)  - tansfuse as needed Hgb < 7.0; getting Epo and Venofer with HD per nephro  - PT / OT eval -> ARU; SW following for disposition    Rhabdomyolysis   Acute renal failure, likely contrast induced nephropathy- now hemodialysis dependent  Acute Hyponatremia  severe malnutrition  - Renal US 3/31 -> No obstruction demonstrated  - Nephrology following for severe LIMA: Likely BAILEY +/- rhabdo +/- ischemic injury with: No signs of recovery and now dialysis dependent; was hemodialysed on 4/16, getting another session on 4/17; next dialysis planned for 4/19/24; getting Epo and Venofer with HD per nephro  - Na 128---131; Cr peak 3.5---->2.01  - monitor BMP  - Avoid NSAIDs / nephrotoxins  - nutrition following; supplements per nutrition    CAD (history of MI x3)  HTN  HLP  History of Takotsubo CM  H/o SVT  *Last coronary angiogram completed 10/2023.    - Continue PTA Coreg 6.25 mg BID, hold lisinopril 10 mg/d due to LIMA, continue Norvasc 5 mg/d.  Hold parameters in place.    - hold  "PTA rosuvastatin 40 mg/d due to acute rhabdo  - hold Jardiance 10 mg/d For now given ARF  - PRN IV hydralazine 10 mg every 4 hours for SBP > 180.    *Recovered EF (55-60%) from ECHO 11/2023.      Chronic anticoagulation therapy with history of DVT/PE  - discussion on anticoagulation as above ; plan to discontinue PTA Xarelto       Colon distention  Loose stools  - CT abdomen 04/03 -> Medium-sized right retroperitoneal and extraperitoneal hematoma. Evaluation for extravasation is unable to be performed without contrast. This is likely similar to slightly smaller than the CT lumbar spine although this is incompletely visualized at that time  Extensive pulmonary opacities.  - CT also noted fluid-filled distention of the small large bowel can be seen in diarrheal state. No obstruction  - continue with some loose stools, though improving, with no fever or pain abdomen; likely related to nutritional supplements; no clinical concern for C diff at this time  - monitor clinically  - ordered Scopolamine patch (4/18) for nausea; prn antiemetics    Celiac Disease  - patient is not happy with \"gluten free diet\" and had questions about her \"Celiac\" diagnosis  - reviewing the chart it seems on 3/19, she had a pill cam study with [facility] which revealed mild non-erosive red tissue in the duodenum, some atrophic type appearance that could be celiac disease  - this was followed by celiac labs on 3/20/24 (Gliadin ab and tTG IgA antibody) which were positive and thus Celiac diagnosis was established and she was recommended for gluten-free diet  - however, patient with nausea and very poor PO intake and declines Gluten free diet; will switch to regular dialysis diet; suggested to consider Gluten free diet once appetite improves     MDD with anxiety  Acute Delirium-- resolved  Contrast dye allergy-- got solumedrol and benadryl per contrast dye allergy protocol.       Tobacco Use D/O   Patient currently smokes max 5 cigarettes per day.     - " Counseled regarding smoking cessation that should also continue with PCP.    - Nicoderm patch ordered.    Type 2 DM  Diabetic neuropathy  *Noted diagnosis on chart review.  However, A1c of 5.2%.    - Hold PTA Jardiance 10 mg/d and gabapentin 100 mg TID due to acute renal failure  - blood glucose has been stable in 80s to 90s without need for any insulin     COPD   - continue PTA inhalers.    - Encourage use of Flutter valve/IS.       GERD  -continue Pepcid bid       Spongiotic dermatitis  *Recently seen at outpatient Derm.    - continued on PTA betamethasone cream BID.      OA  - Pain management as above.       Charcot-Breonna-Tooth foot deformity  *Noted on chart review.  No interventions.      Stage VALENTIN marginal zone B-cell lymphoma  *Follows with [facility] (Dr. Barrow).    - Continue outpatient surveillance (CT scan in 8/2024).         DVT Prophylaxis: PTA xarelto d/shania as above; PCD boots      Cardiac Monitoring: None  Code Status: Full Code      Diet:   Snacks/Supplements Adult: Ensure Enlive; With Meals  Fluid restriction 1200 ML FLUID  Snacks/Supplements Adult: Ensure Enlive; With Meals  Gluten Free Diet        Disposition:    Medically Ready for Discharge: Anticipated in 2-4 Days  - pending vascular surgery clearance  - needs acute rehab; care coordinator following for disposition    Clinically Significant Risk Factors            # Hypomagnesemia: Lowest Mg = 1.6 mg/dL in last 2 days, will replace as needed   # Hypoalbuminemia: Lowest albumin = 1.6 g/dL at 4/15/2024  5:36 AM, will monitor as appropriate         # Hypertension: Noted on problem list           # Severe Malnutrition: based on nutrition assessment      # Financial/Environmental Concerns:                Page Me (7 am to 6 pm)    Care plan discussed with patient and nursing; also discussed with care management team    High medical complexity              Physical Exam:      Blood pressure 127/54, pulse 70, temperature 99  F (37.2  C),  "temperature source Oral, resp. rate 16, height 1.549 m (5' 1\"), weight 55.5 kg (122 lb 5.7 oz), SpO2 92%, not currently breastfeeding.  Vitals:    04/14/24 0544 04/15/24 0619 04/16/24 0605   Weight: 60.2 kg (132 lb 11.5 oz) 58.2 kg (128 lb 4.9 oz) 55.5 kg (122 lb 5.7 oz)     Vital Signs with Ranges  Temp:  [98.1  F (36.7  C)-99  F (37.2  C)] 99  F (37.2  C)  Pulse:  [59-77] 70  Resp:  [13-27] 16  BP: (101-173)/(54-91) 127/54  SpO2:  [89 %-98 %] 92 %  I/O's Last 24 hours  I/O last 3 completed shifts:  In: 1110 [P.O.:1110]  Out: 4000 [Other:4000]    Constitutional: Alert, awake and oriented ; resting comfortably in no apparent distress; appears frail    right dialysis catheter in place   Oral cavity: Moist mucosa   Cardiovascular: Normal s1 s2, regular rate and rhythm, no murmur   Lungs: B/l clear to auscultation, no wheezes or crepitations   Abdomen: Soft, nt, nd, no guarding, rigidity or rebound; BS +   LE : LLE edema ++   Musculoskeletal/Neuro Right AKA stump in dressing; noted anterior ecchymosis   Psychiatry: normal mood and affect                Medications:        Current Facility-Administered Medications   Medication Dose Route Frequency Provider Last Rate Last Admin    - MEDICATION INSTRUCTIONS for Dialysis Patients -   Does not apply See Admin Instructions Marcin White MD        albuterol (PROVENTIL) neb solution 2.5 mg  2.5 mg Nebulization Q6H Marcin White MD   2.5 mg at 04/18/24 0709    amLODIPine (NORVASC) tablet 5 mg  5 mg Oral Daily Torrey Alegria MD   5 mg at 04/17/24 1154    betamethasone dipropionate (DIPROSONE) 0.05 % cream   Topical BID Marcin White MD   Given at 04/16/24 1944    carvedilol (COREG) tablet 6.25 mg  6.25 mg Oral BID w/meals Marcin White MD   6.25 mg at 04/17/24 1757    clopidogrel (PLAVIX) tablet 75 mg  75 mg Oral Daily Gala Morales DO   75 mg at 04/17/24 1154    Contraindications to both pharmacological and mechanical prophylaxis (must document contraindications for both in " this order)   Does not apply See Admin Instructions Marcin White MD        famotidine (PEPCID) tablet 20 mg  20 mg Oral Q48H Sofia Cristobal MD        fluticasone-vilanterol (BREO ELLIPTA) 200-25 MCG/ACT inhaler 1 puff  1 puff Inhalation Daily Marcin White MD   1 puff at 04/16/24 1312    heparin ANTICOAGULANT injection 5,000 Units  5,000 Units Subcutaneous Q8H Gala Morales DO   5,000 Units at 04/18/24 0232    Rigo PACK 1 packet  1 packet Oral BID 09 12 Gala Morales DO   1 packet at 04/16/24 1313    lactobacillus rhamnosus (GG) (CULTURELL) capsule 1 capsule  1 capsule Oral BID Gala Morales DO   1 capsule at 04/17/24 2021    miconazole (MICATIN) 2 % powder   Topical BID Marcin White MD   Given at 04/17/24 2023    multivitamin RENAL (TRIPHROCAPS) capsule 1 capsule  1 capsule Oral Daily Gala Morales DO   1 capsule at 04/17/24 1153    nicotine (NICODERM CQ) 14 MG/24HR 24 hr patch 1 patch  1 patch Transdermal Daily Marcin White MD   1 patch at 04/17/24 1141    sodium chloride (PF) 0.9% PF flush 10-40 mL  10-40 mL Intracatheter Q8H Sofia Cristobal MD   40 mL at 04/18/24 0609     PRN Meds:   Current Facility-Administered Medications   Medication Dose Route Frequency Provider Last Rate Last Admin    acetaminophen (TYLENOL) tablet 650 mg  650 mg Oral Q4H PRN Marcin White MD   650 mg at 04/17/24 1757    dextrose 10% infusion   Intravenous Continuous PRN Marcin White MD        glucose gel 15-30 g  15-30 g Oral Q15 Min PRN Marcin White MD   15 g at 04/12/24 1735    Or    dextrose 50 % injection 25-50 mL  25-50 mL Intravenous Q15 Min PRN Marcin White MD   25 mL at 04/03/24 1648    Or    glucagon injection 1 mg  1 mg Subcutaneous Q15 Min PRN Marcin White MD        diphenoxylate-atropine (LOMOTIL) 2.5-0.025 MG per tablet 1 tablet  1 tablet Oral 4x Daily PRN Gala Morales,    1 tablet at 04/14/24 1154    hydrALAZINE (APRESOLINE) tablet 25 mg  25 mg Oral 4x Daily PRN Shayy Law MD         HYDROmorphone (DILAUDID) injection 0.2 mg  0.2 mg Intravenous Q2H PRN Gala Lo MD        Or    HYDROmorphone (DILAUDID) injection 0.4 mg  0.4 mg Intravenous Q2H PRN Gala Lo MD   0.4 mg at 04/10/24 1419    HYDROmorphone (DILAUDID) tablet 2 mg  2 mg Oral Q4H PRN Marcin White MD   2 mg at 04/17/24 1324    HYDROmorphone (DILAUDID) tablet 4 mg  4 mg Oral Q4H PRN Marcin White MD   4 mg at 04/17/24 1757    lidocaine (LMX4) cream   Topical Q1H PRN Kaylee Liu, NP        lidocaine (LMX4) cream   Topical Q1H PRN Marcin White MD        lidocaine 1 % 0.1-1 mL  0.1-1 mL Other Q1H PRN Kaylee Liu, ANNE        lidocaine 1 % 0.1-1 mL  0.1-1 mL Other Q1H PRN Marcin White MD        metoclopramide (REGLAN) tablet 5 mg  5 mg Oral Q6H PRN Marcin White MD        Or    metoclopramide (REGLAN) injection 5 mg  5 mg Intravenous Q6H PRN Marcin White MD   5 mg at 04/17/24 1747    naloxone (NARCAN) injection 0.2 mg  0.2 mg Intravenous Q2 Min PRN Marcin White MD        Or    naloxone (NARCAN) injection 0.4 mg  0.4 mg Intravenous Q2 Min PRN Marcin White MD        Or    naloxone (NARCAN) injection 0.2 mg  0.2 mg Intramuscular Q2 Min PRN Marcin White MD        Or    naloxone (NARCAN) injection 0.4 mg  0.4 mg Intramuscular Q2 Min PRN Marcin White MD        OLANZapine (zyPREXA) injection 5 mg  5 mg Intramuscular Daily PRN Marcin White MD        ondansetron (ZOFRAN ODT) ODT tab 4 mg  4 mg Oral Q6H PRN Marcin White MD   4 mg at 04/08/24 2338    Or    ondansetron (ZOFRAN) injection 4 mg  4 mg Intravenous Q6H PRN Marcin White MD   4 mg at 04/17/24 1305    polyethylene glycol (MIRALAX) Packet 17 g  17 g Oral Daily PRN Marcin White MD        Reason statin not prescribed   Does not apply DOES NOT GO TO Marcin Gomes MD        sodium chloride (PF) 0.9% PF flush 10-20 mL  10-20 mL Intracatheter q1 min prn Sofia Cristobal MD   20 mL at 04/10/24 0558    sodium chloride (PF) 0.9% PF flush 10-40 mL  10-40 mL  Intracatheter Q1H PRN Sofia Cristobal MD   20 mL at 04/09/24 1831    sodium chloride (PF) 0.9% PF flush 3 mL  3 mL Intracatheter q1 min prn Kaylee Liu NP        sodium chloride 0.9% BOLUS 1-250 mL  1-250 mL Intravenous Q1H PRN Marcin White MD        sodium chloride 0.9% BOLUS 1-250 mL  1-250 mL Intravenous Q1H PRN Marcin White MD        sodium chloride 0.9% BOLUS 1-250 mL  1-250 mL Intravenous Q1H PRN Marcin White MD        sodium chloride 0.9% BOLUS 1-250 mL  1-250 mL Intravenous Q1H PRN Marcin White MD                Data:      All new lab and imaging data was reviewed.   Recent Labs   Lab Test 04/18/24  0604 04/17/24  0636 04/16/24  0642 04/15/24  0536 04/13/24  0655 04/11/24  0643 10/07/23  1704 10/07/23  0516 10/06/23  1928 10/06/23  0551   WBC  --  8.5 8.7 8.0   < > 5.0   < > 7.7   < > 9.3   HGB 8.2* 8.5* 9.2* 8.9*   < > 8.4*   < > 10.0*   < > 10.8*   MCV  --  91 90 89   < > 86   < > 87   < > 87   PLT  --  225 227 202   < > 151   < > 120*   < > 177   INR  --   --   --   --   --  1.31*  --  1.38*  --  1.08    < > = values in this interval not displayed.      Recent Labs   Lab Test 04/18/24  0604 04/18/24  0202 04/17/24  2200 04/17/24  0636 04/17/24  0635 04/16/24  0642 04/15/24  1146 04/15/24  0536   NA  --   --   --  131*  --  128*  --  123*   POTASSIUM 3.8  --   --  3.9  --  4.0  --  4.9   CHLORIDE  --   --   --  95*  --  93*  --  90*   CO2  --   --   --  22  --  19*  --  23   BUN  --   --   --  41.2*  --  48.0*  --  81.6*   CR  --   --   --  2.01*  --  2.55*  --  3.50*   ANIONGAP  --   --   --  14  --  16*  --  10   SONIA  --   --   --  7.7*  --  7.7*  --  7.5*   GLC  --  81 118* 82   < > 81   < > 84    < > = values in this interval not displayed.     Recent Labs   Lab Test 06/10/20  1243 02/16/20  1824 09/18/19  0739   TROPI <0.015 <0.015 <0.015

## 2024-04-18 NOTE — PROGRESS NOTES
I reviewed her chart. 2 hrs HD before she is is discharged tomorrow.   TTS HD schedule at acute rehab facility

## 2024-04-18 NOTE — PLAN OF CARE
Date & Time: 4/18 2606-7953  Surgery/POD#: 17 R AKA  Behavior & Aggression: Green, frustrated, sad  Fall Risk: Yes  Orientation: A&Ox4  ABNL VS/O2:VSS on 1L when sleeping; RA when awake  ABNL Labs: Hgb 8.2  Pain Management: PO dilaudid & Tylenol x1  Bowel/Bladder: Large loose BM x1  Drains: NA  Wounds/incisions: R stump dressing changed, black stop to anterior thigh, incision with mild errythema otherwise WDL  Diet: 640/1200 fluid restriction, Renal diet  Activity Level: Lift  Tests/Procedures: Dialysis tomorrow  Anticipated  DC Date: ARU tomorrow  Significant Information: Poor PO intake due to nausea - zofran given x2. Refusing isma. PICC WDL

## 2024-04-18 NOTE — PROGRESS NOTES
CLINICAL NUTRITION SERVICES - BRIEF NOTE     Received page from RN regarding diarrhea potential from oral nutrition supplements  See RD note on 4/16 for most recent full nutrition assessment.     NEW FINDINGS   Diet liberalized this AM from Gluten-free to Renal/dialysis. Agree w/ diet liberalization to promote PO intake. Writer modified diet from Gluten-free to Renal/dialysis after Follow-up on 4/16 to promote PO intake as well.     I/O: 4x BM on 4/16, 3x on 4/17    Concern that she may be having increased stooling, diarrhea form supplements. Per d/w RN, pt is not eating much at all, not taking much supplements either. Will switch to a lower sugar, sometimes better tolerated-- [employer].     If PO intake continues to decline, may need to re-start kcal ct and re-assess need for FT replacement.     INTERVENTIONS  Implementation  Collaboration with other providers  Medical food supplement therapy     Keiko Briceno, RD, LD  Pager: [phone]

## 2024-04-18 NOTE — PROGRESS NOTES
"Care Management Follow Up    Length of Stay (days): 20    Expected Discharge Date: 04/19/2024     Concerns to be Addressed: discharge planning  anticipate [facility] when medically ready and bed  Patient plan of care discussed at interdisciplinary rounds: Yes    Anticipated Discharge Disposition: Acute Rehab     Anticipated Discharge Services: Transportation Services  Anticipated Discharge DME:  (TBD)    Patient/family educated on Medicare website which has current facility and service quality ratings: yes  Education Provided on the Discharge Plan: Yes  Patient/Family in Agreement with the Plan: yes    Referrals Placed by CM/SW:  (ARU)  Private pay costs discussed: Not applicable    Additional Information:  Received call from [facility] liaison [facility] [phone] ext [phone]; they asked for pt's rehab destination.  Reviewed 4/17 CM progress note ([facility] [phone] outpatient chair time is confirmed for Tue/Th/Sat 0640) and nephrology note \"Next HD trx is on Friday. \"     JANAY-RN reached out to ARU liaison; pt participated in therapies yesterday and if does so today as well would be considered ARU appropriate with ability to come Fri 4/19 after dialysis if medically appropriate.     JANAY-RN messaged Hospitalist to find out if anticipated medically ready for ARU (acute inpatient rehab) tomorrow--MD anticipates yes, if cleared by vascular surgery.  JANAY-RN messaged vascular surgery.  Dr. Lozano wrote back \"Ok with us.\"  Updated Hospitalist. Updated patient.  Updated patient sister.  Updated ARU liaison.      CM-RN Provided update to [facility], who wrote back:   Thank you for this update. I will inform the center about this as well  [facility] Caicedo , Admissions Specialist II, [facility]  [facility], [address]  [place] [address]  (E) [email or web address]  (P) [phone] Ext: [phone]  (F) [phone]    CM-RN added [facility] and [facility] " [facility] (selected/accepted).     Kera Turcios RN, BSN, PHN  [facility]  Inpatient Care Management - FLOAT  Gen Surg CM RN Mobile: [phone] daily 7:30-4:00

## 2024-04-18 NOTE — PLAN OF CARE
Date & Time: 4/17 0700-1930  Surgery/POD#: 16 R AKA  Behavior & Aggression: Green, frustrated, sad  Fall Risk: Yes  Orientation:A&Ox4  ABNL VS/O2:VSS ex HTN 2L when sleeping  ABNL Labs: Creat 2.01, Na 131  Pain Management:PO dilaudid & Tylenol x2,   Bowel/Bladder: Frequent small loose BMs  Drains: NA  Wounds/incisions: R stump reinforced  Diet:1200 fluid restriction, Gluten free diet  Activity Level: Lift  Tests/Procedures: Dialysis completed  Anticipated  DC Date: ARU pending  Significant Information: Poor PO intake due to nausea - zofran & reglan given. Refusing isma. PICC WDL

## 2024-04-18 NOTE — PROGRESS NOTES
Care Management Follow Up    Length of Stay (days): 20    Expected Discharge Date: 04/19/2024     Concerns to be Addressed: discharge planning  anticipate [facility] Acute Rehab Unit when medically ready and bed  Patient plan of care discussed at interdisciplinary rounds: Yes    Anticipated Discharge Disposition: Acute Rehab at [facility]     Anticipated Discharge Services: Transportation Services Via [facility] w/c  Anticipated Discharge DME:  (TBD)    Patient/family educated on Medicare website which has current facility and service quality ratings: yes  Education Provided on the Discharge Plan: Yes  Patient/Family in Agreement with the Plan: yes    Referrals Placed by CM/SW:  (ARU)  Private pay costs discussed: transportation costs    Additional Information:  SW spoke with pt regarding transportation to [facility] tomorrow and she states that she is able to transfer to the chair with assistance and sit upright.  JUANITA scheduled a w/c transport for pt to [facility] tomorrow between 1:09pm-1:54pm tomorrow after dialysis.    DAWN Mendez

## 2024-04-18 NOTE — PROGRESS NOTES
VASCULAR SURGERY    Had a good day yesterday.  Tolerating diet.  Was able to get up into recliner.        Patient has had no recent DVT or PE--thus anticoagulation be side antiplatelet not indicated      Patient was not aware of need Gluten-free diet but recommended by nutrition    Appreciate cares by teams.      Chadwick Lozano MD

## 2024-04-19 ENCOUNTER — APPOINTMENT (OUTPATIENT)
Dept: OCCUPATIONAL THERAPY | Facility: CLINIC | Age: 64
DRG: 270 | End: 2024-04-19
Payer: COMMERCIAL

## 2024-04-19 LAB
GLUCOSE BLDC GLUCOMTR-MCNC: 72 MG/DL (ref 70–99)
GLUCOSE BLDC GLUCOMTR-MCNC: 81 MG/DL (ref 70–99)
HGB BLD-MCNC: 8.3 G/DL (ref 11.7–15.7)
MAGNESIUM SERPL-MCNC: 1.7 MG/DL (ref 1.7–2.3)
PHOSPHATE SERPL-MCNC: 3.7 MG/DL (ref 2.5–4.5)
POTASSIUM SERPL-SCNC: 4.4 MMOL/L (ref 3.4–5.3)

## 2024-04-19 PROCEDURE — 250N000013 HC RX MED GY IP 250 OP 250 PS 637: Performed by: STUDENT IN AN ORGANIZED HEALTH CARE EDUCATION/TRAINING PROGRAM

## 2024-04-19 PROCEDURE — 250N000011 HC RX IP 250 OP 636: Performed by: STUDENT IN AN ORGANIZED HEALTH CARE EDUCATION/TRAINING PROGRAM

## 2024-04-19 PROCEDURE — 99233 SBSQ HOSP IP/OBS HIGH 50: CPT | Performed by: HOSPITALIST

## 2024-04-19 PROCEDURE — 83735 ASSAY OF MAGNESIUM: CPT | Performed by: HOSPITALIST

## 2024-04-19 PROCEDURE — 84100 ASSAY OF PHOSPHORUS: CPT | Performed by: HOSPITALIST

## 2024-04-19 PROCEDURE — 999N000156 HC STATISTIC RCP CONSULT EA 30 MIN

## 2024-04-19 PROCEDURE — 97530 THERAPEUTIC ACTIVITIES: CPT | Mod: GO

## 2024-04-19 PROCEDURE — 90935 HEMODIALYSIS ONE EVALUATION: CPT

## 2024-04-19 PROCEDURE — 250N000013 HC RX MED GY IP 250 OP 250 PS 637: Performed by: INTERNAL MEDICINE

## 2024-04-19 PROCEDURE — 85018 HEMOGLOBIN: CPT | Performed by: HOSPITALIST

## 2024-04-19 PROCEDURE — 84132 ASSAY OF SERUM POTASSIUM: CPT | Performed by: HOSPITALIST

## 2024-04-19 PROCEDURE — 97535 SELF CARE MNGMENT TRAINING: CPT | Mod: GO

## 2024-04-19 PROCEDURE — 250N000011 HC RX IP 250 OP 636: Performed by: INTERNAL MEDICINE

## 2024-04-19 PROCEDURE — 90935 HEMODIALYSIS ONE EVALUATION: CPT | Performed by: INTERNAL MEDICINE

## 2024-04-19 PROCEDURE — 634N000001 HC RX 634: Mod: JZ | Performed by: INTERNAL MEDICINE

## 2024-04-19 PROCEDURE — 120N000001 HC R&B MED SURG/OB

## 2024-04-19 PROCEDURE — 82565 ASSAY OF CREATININE: CPT

## 2024-04-19 PROCEDURE — 258N000003 HC RX IP 258 OP 636: Performed by: INTERNAL MEDICINE

## 2024-04-19 RX ORDER — ALBUMIN (HUMAN) 12.5 G/50ML
50 SOLUTION INTRAVENOUS
Status: DISCONTINUED | OUTPATIENT
Start: 2024-04-19 | End: 2024-04-19

## 2024-04-19 RX ORDER — HYDROMORPHONE HYDROCHLORIDE 4 MG/1
4 TABLET ORAL EVERY 6 HOURS PRN
Qty: 20 TABLET | Refills: 0 | Status: ON HOLD | OUTPATIENT
Start: 2024-04-19 | End: 2024-05-15

## 2024-04-19 RX ORDER — ALBUTEROL SULFATE 0.83 MG/ML
2.5 SOLUTION RESPIRATORY (INHALATION) EVERY 6 HOURS PRN
Status: DISCONTINUED | OUTPATIENT
Start: 2024-04-19 | End: 2024-04-22 | Stop reason: HOSPADM

## 2024-04-19 RX ADMIN — CLOPIDOGREL BISULFATE 75 MG: 75 TABLET ORAL at 08:17

## 2024-04-19 RX ADMIN — FLUTICASONE FUROATE AND VILANTEROL TRIFENATATE 1 PUFF: 200; 25 POWDER RESPIRATORY (INHALATION) at 13:00

## 2024-04-19 RX ADMIN — Medication: at 10:12

## 2024-04-19 RX ADMIN — AMLODIPINE BESYLATE 5 MG: 5 TABLET ORAL at 13:00

## 2024-04-19 RX ADMIN — NICOTINE 1 PATCH: 14 PATCH, EXTENDED RELEASE TRANSDERMAL at 08:17

## 2024-04-19 RX ADMIN — CARVEDILOL 6.25 MG: 6.25 TABLET, FILM COATED ORAL at 17:45

## 2024-04-19 RX ADMIN — HEPARIN SODIUM 1600 UNITS: 1000 INJECTION INTRAVENOUS; SUBCUTANEOUS at 10:51

## 2024-04-19 RX ADMIN — MICONAZOLE NITRATE: 2 POWDER TOPICAL at 21:27

## 2024-04-19 RX ADMIN — HEPARIN SODIUM 5000 UNITS: 5000 INJECTION, SOLUTION INTRAVENOUS; SUBCUTANEOUS at 17:45

## 2024-04-19 RX ADMIN — SODIUM CHLORIDE 250 ML: 9 INJECTION, SOLUTION INTRAVENOUS at 08:53

## 2024-04-19 RX ADMIN — CARVEDILOL 6.25 MG: 6.25 TABLET, FILM COATED ORAL at 13:00

## 2024-04-19 RX ADMIN — IRON SUCROSE 100 MG: 20 INJECTION, SOLUTION INTRAVENOUS at 10:09

## 2024-04-19 RX ADMIN — HEPARIN SODIUM 5000 UNITS: 5000 INJECTION, SOLUTION INTRAVENOUS; SUBCUTANEOUS at 13:00

## 2024-04-19 RX ADMIN — HEPARIN SODIUM 5000 UNITS: 5000 INJECTION, SOLUTION INTRAVENOUS; SUBCUTANEOUS at 02:19

## 2024-04-19 RX ADMIN — Medication 1 CAPSULE: at 21:27

## 2024-04-19 RX ADMIN — EPOETIN ALFA-EPBX 10000 UNITS: 10000 INJECTION, SOLUTION INTRAVENOUS; SUBCUTANEOUS at 10:09

## 2024-04-19 RX ADMIN — MICONAZOLE NITRATE: 2 POWDER TOPICAL at 13:01

## 2024-04-19 RX ADMIN — SODIUM CHLORIDE 300 ML: 9 INJECTION, SOLUTION INTRAVENOUS at 08:45

## 2024-04-19 RX ADMIN — Medication 1 CAPSULE: at 08:17

## 2024-04-19 ASSESSMENT — ACTIVITIES OF DAILY LIVING (ADL)
ADLS_ACUITY_SCORE: 34
ADLS_ACUITY_SCORE: 35
ADLS_ACUITY_SCORE: 34
ADLS_ACUITY_SCORE: 35
ADLS_ACUITY_SCORE: 35
ADLS_ACUITY_SCORE: 34
ADLS_ACUITY_SCORE: 35
ADLS_ACUITY_SCORE: 34
ADLS_ACUITY_SCORE: 35
ADLS_ACUITY_SCORE: 35
ADLS_ACUITY_SCORE: 34
ADLS_ACUITY_SCORE: 34
ADLS_ACUITY_SCORE: 35
ADLS_ACUITY_SCORE: 35
ADLS_ACUITY_SCORE: 34
ADLS_ACUITY_SCORE: 35

## 2024-04-19 NOTE — PLAN OF CARE
Surgery/POD#: POD #18 s/p R AKA  Orientation: A&Ox4  ABNL VS/O2: VSS on 1L O2 overnight  ABNL Labs: Hgb 8.2, K+/Mag/Phos replacement protocols--rechecks in AM.  Pain Management: PRN PO Dilaudid & Tylenol   Bowel/Bladder: Small loose BM x2 overnight  Drains: PICC SL. HD port HL.  Wounds/incisions: R stump dressing w/ minimal drainage. Mepilex to coccyx CDI.  Diet: Renal diet, 1200 ml fluid restriction. Continues to have poor appetite & poor PO intake.  Activity Level: Lift. Encouraged weight shifting in bed. Turn/repositioned as allowed.  Tests/Procedures: Dialysis today  Anticipated  DC Date: ARU today between 1-2PM  Significant Information: Episode of nausea w/ small emesis, Zofran x1 effective.

## 2024-04-19 NOTE — PROGRESS NOTES
Potassium   Date Value Ref Range Status   04/19/2024 4.4 3.4 - 5.3 mmol/L Final   12/29/2022 4.3 3.4 - 5.3 mmol/L Final   07/09/2021 5.2 3.4 - 5.3 mmol/L Final     Hemoglobin   Date Value Ref Range Status   04/19/2024 8.3 (L) 11.7 - 15.7 g/dL Final   07/09/2021 8.8 (L) 11.7 - 15.7 g/dL Final     Creatinine   Date Value Ref Range Status   04/17/2024 2.01 (H) 0.51 - 0.95 mg/dL Final   07/09/2021 0.77 0.52 - 1.04 mg/dL Final     Urea Nitrogen   Date Value Ref Range Status   04/17/2024 41.2 (H) 8.0 - 23.0 mg/dL Final   12/29/2022 17 7 - 30 mg/dL Final   07/09/2021 13 7 - 30 mg/dL Final     Sodium   Date Value Ref Range Status   04/17/2024 131 (L) 135 - 145 mmol/L Final     Comment:     Reference intervals for this test were updated on 09/26/2023 to more accurately reflect our healthy population. There may be differences in the flagging of prior results with similar values performed with this method. Interpretation of those prior results can be made in the context of the updated reference intervals.    07/09/2021 132 (L) 133 - 144 mmol/L Final     INR   Date Value Ref Range Status   04/11/2024 1.31 (H) 0.85 - 1.15 Final   09/24/2020 1.01 0.86 - 1.14 Final       DIALYSIS PROCEDURE NOTE  Hepatitis status of previous patient on machine log was checked and verified ok to use with this patients hepatitis status.  Patient dialyzed for 3 hrs. on a K3 bath with a net fluid removal of  3L.  A BFR of 350 ml/min was obtained via a R tunneled CVC.      The treatment plan was discussed with Dr. Barboza during the treatment.    Total heparin received during the treatment: 0 units.   Line flushed, clamped and capped with heparin 1:1000 1.6 mL (1600 units) per lumen    Meds  given: EPO, Venofer   Complications: None      Person educated: Patient. Knowledge base limited. Barriers to learning: none. Educated on procedure via verbal mode. Patient verbalized understanding.   ICEBOAT? Timeout performed pre-treatment  I: Patient was  identified using 2 identifiers  C:  Consent Signed Yes  E: Equipment preventative maintenance is current and dialysis delivery system OK to use  B:    Latest Reference Range & Units 04/03/24 04:23 04/03/24 11:41   Hep B Surface Agn Nonreactive  Nonreactive    Hepatitis B Surface Antibody Instrument Value <8.5 m[IU]/mL  <3.50   Hepatitis B Surface Antibody   Nonreactive     O: Dialysis orders present and complete prior to treatment  A: Vascular access verified and assessed prior to treatment  T: Treatment was performed at a clinically appropriate time  ?: Patient was allowed to ask questions and address concerns prior to treatment  See Adult Hemodialysis flowsheet in [facility] for further details and post assessment.  Machine water alarm in place and functioning. Transducer pods intact and checked every 15min.   Pt assisted with repositioning throughout dialysis treatment.  Pt returned via bed.  Chlorine/Chloramine water system checked every 4 hours.  Outpatient Dialysis at D    Post treatment report given to MARISSA Worthy regarding 3L of fluid removed, last /66.    Bertha Alvarez RN

## 2024-04-19 NOTE — PROGRESS NOTES
Surgery/POD#: POD #17 s/p R AKA  Orientation: A&Ox4  ABNL VS/O2: VSS on 1L when sleeping  ABNL Labs: Creat 2.01, Na 131, Hgb 8.5  Pain Management: PRN PO Dilaudid & Tylenol available  Bowel/Bladder: Small loose BM x1 overnight  Drains: PICC SL. HD port HL.  Wounds/incisions: R stump dressing CDI  Diet: Gluten free diet, 1200 ml fluid restriction  Activity Level: Lift. Encouraged weight shifting in bed.  Tests/Procedures: Dialysis planned for tomorrow 4/19  Anticipated  DC Date: ARU pending  Significant Information: Denied nausea.

## 2024-04-19 NOTE — PROGRESS NOTES
Renal Medicine Inpatient Dialysis Note                                Shirley Hendricks MRN# [id number]   Age: 63 year old YOB: 1960   Date of Admission: 3/29/2024 Hospital LOS: 21          Assessment/Plan:     1.  Suspect modest CKD at baseline              -slow progression over time              -DN/vascular disease  2.  Last available UA 2019              -no quantification studies  3.  Acute Kidney Injury              -oliguric              -IV contrast                          -3 separate consecutive exposures               -elevated CK     Likely BAILEY +/- rhabdo +/- ischemic injury  Presuming no interruption of RBF     4.  Acidosis              -normalized   5.  Ischemic RLE due to occluded bypass graft   -AKA due to unsalvagable limb 04/01/24  6.  Hypocalcemia  7.  Anemia              -transfuse as indicated  8.  Modest Hyponatremia     Discharged delayed until 04/20/24  TTS schedule by review at ARU    Should be OK until dialysis 04/23/24  Can draw labs at ARU for ? dialysis 04/22/23    Have extended today's run to 3 hours based on above       Interval History:       Dialysis run parameters reviewed with dialysis RN at patient bedside.  Seen on run      2 liter goal  3 K bath  TDC  Fe  CRIS    Feels poorly post runs  Anorexia/emesis     ROS     GENERAL: NAD, No fever,chills  R: NEGATIVE for significant cough or SOB  CV: NEGATIVE for chest pain, palpitations  EXT: no change edema  ROS otherwise negative    Dialysis Parameters:     Vitals were reviewed  Patient Vitals for the past 12 hrs:   BP Temp Temp src Pulse Resp SpO2 Weight   04/19/24 1030 130/80 -- -- 69 14 98 % --   04/19/24 1015 105/67 -- -- 64 17 99 % --   04/19/24 1000 123/71 -- -- 66 14 98 % --   04/19/24 0945 109/49 -- -- 60 14 97 % --   04/19/24 0930 116/55 -- -- 61 13 97 % --   04/19/24 0915 131/66 -- -- 58 17 94 % --   04/19/24 0900 (!) 151/63 -- -- 62 14 98 % --   04/19/24 0854 (!) 146/65 -- -- 64 16 97 % --   04/19/24 0853  "-- -- -- 61 14 96 % --   04/19/24 0848 -- -- -- 60 13 (!) 89 % --   04/19/24 0845 (!) 140/62 98.3  F (36.8  C) Oral 61 26 90 % --   04/19/24 0826 (!) 193/80 98.5  F (36.9  C) Oral 71 16 91 % --   04/19/24 0549 -- -- -- -- -- -- 53.2 kg (117 lb 4.6 oz)   04/19/24 0224 -- -- -- -- -- 93 % --   04/19/24 0220 (!) 144/74 98.9  F (37.2  C) Oral 70 16 (!) 89 % --     Wt Readings from Last 4 Encounters:   04/19/24 53.2 kg (117 lb 4.6 oz)   01/08/24 49.8 kg (109 lb 12.8 oz)   12/12/23 50.8 kg (112 lb)   11/21/23 50.6 kg (111 lb 8 oz)     I/O last 3 completed shifts:  In: 600 [P.O.:600]  Out: 50 [Emesis/NG output:50]    Vitals:    04/13/24 0526 04/14/24 0544 04/15/24 0619 04/16/24 0605   Weight: 58.5 kg (128 lb 15.5 oz) 60.2 kg (132 lb 11.5 oz) 58.2 kg (128 lb 4.9 oz) 55.5 kg (122 lb 5.7 oz)    04/19/24 0549   Weight: 53.2 kg (117 lb 4.6 oz)       Current Weight: 53.2  Dry Weight: 50 range ?  Dialysis Temp: 36.5  C  Access Device: TDC  Access Site: right  Dialyzer: Revaclear  Dialysis Bath: 3  Sodium Profile: n  UF Goal: 2  Blood Flow Rate (mL/min): 250  Total Treatment Time (hrs): 3  Heparin: Low dose as required      EPO dose: y  Zemplar: n  IV Fe: y      Medications and Allergies:     Reviewed      Physical Exam:     Seen and examined during course of dialysis run    /80   Pulse 69   Temp 98.3  F (36.8  C) (Oral)   Resp 14   Ht 1.549 m (5' 1\")   Wt 53.2 kg (117 lb 4.6 oz)   LMP  (LMP Unknown)   SpO2 98%   BMI 22.16 kg/m      GENERAL: awake, alert, follows  HEENT: NC/AT, PERRLA, EOMI, non icteric, pharynx moist without lesion  RESP: coarse  CV: RRR, normal S1 S2  MS: + edema/right AKA  SKIN: catheter site clean without drainage    Data:       Recent Labs   Lab 04/19/24  0550 04/19/24  0201 04/17/24  2200 04/17/24  0636   NA  --   --   --  131*   POTASSIUM 4.4  --    < > 3.9   CHLORIDE  --   --   --  95*   CO2  --   --   --  22   ANIONGAP  --   --   --  14   GLC  --  72   < > 82   BUN  --   --   --  41.2*   CR  " --   --   --  2.01*   GFRESTIMATED  --   --   --  27*   SONIA  --   --   --  7.7*    < > = values in this interval not displayed.     Recent Labs   Lab 04/17/24  0636 04/16/24  0642 04/15/24  0536 04/13/24  0655   CR 2.01* 2.55* 3.50* 2.62*     Recent Labs   Lab Test 04/17/24  0636 04/16/24  0642 04/15/24  0536 04/13/24  0655 04/12/24  0553 04/11/24  0643 04/10/24  1835 04/10/24  0705 04/10/24  0014 04/09/24  1817   * 128* 123* 130* 127* 128* 130* 124* 122* 124*     Recent Labs   Lab 04/17/24  0636 04/16/24  0642 04/15/24  0536   ALBUMIN 2.3* 2.1* 1.6*     Recent Labs   Lab 04/19/24  0550 04/18/24  0604 04/17/24  0636 04/16/24  0642   PHOS 3.7 2.9 3.0 3.9   HGB 8.3* 8.2* 8.5* 9.2*         G Nayan Barboza MD    [facility] - Nephrology  [phone]

## 2024-04-19 NOTE — PROGRESS NOTES
Care Management Follow Up    Length of Stay (days): 21    Expected Discharge Date: 04/19/2024     Concerns to be Addressed: discharge planning  anticipate [facility] when medically ready and bed  Patient plan of care discussed at interdisciplinary rounds: Yes    Anticipated Discharge Disposition: Acute Rehab     Anticipated Discharge Services: Transportation Services  Anticipated Discharge DME:  (TBD)    Patient/family educated on Medicare website which has current facility and service quality ratings: yes  Education Provided on the Discharge Plan: Yes  Patient/Family in Agreement with the Plan: yes    Referrals Placed by CM/SW:  (ARU)  Private pay costs discussed:   Additional Information:  Updated by ARU admit liaision that admission is delayed until tomorrow due to patient using IV Zofran overnight. Updated Dr. Maurer, Dr. Alegria and bedside RN.     Leonor Mcguire, RN   [facility] [place]   Phone [phone], [employer] or [phone]

## 2024-04-19 NOTE — PROGRESS NOTES
[facility]  Hospitalist Progress Note        Tanner Maurer MD   04/19/2024        Interval History:        - Reports feeling fine apart from some nausea and loose stools but not watery  - ARU unable to accept 4/19 due to needing IV antiemetics; will discontinue prn IV antiemetics; scopolamine patch was ordered 4/18  - planned for repeat dialysis 4/19  - likely ARU discharge on 4/20         Assessment and Plan:        Shirley Hendricks is a 63 year old female with PMH of  PAD/PVD, Tobacco use D/O, CAD (history of MI x3), HTN, HLP, DM2, Neuropathy, COPD, GERD, Anemia, Spongiotic dermatitis, MDD with anxiety, Chronic anticoagulation therapy with history of DVT/PE, OA, Charcot-Breonna-Tooth foot deformity, Marginal zone B-cell lymphoma, History of SBO, History of Takotsubo CM, History of SVT who presented with right leg pain and admitted on 3/29/2024 due to acute occlusion of right LE bypass graft.    She underwent emergent right above knee amputation per vascular surgery. Hospital course further complicated by likely contrast introduced acute kidney injury requiring initiation of hemodialysis.     Right common femoral artery to mid anterior tibial artery bypass graft acute occlusion  S/p Transarticular guillotine right through the knee amputation 4/1/24  S/p completion about the knee amputation right LE 4/9/24.  PAD/PVD  Hypertension  acute Blood Loss Anemia  Recent GI bleed (12/2023 and admitted at [facility])  - on admission, right arterial LE US revealed the right common femoral artery to mid anterior tibial artery bypass graft occlusion  - evaluated by vascular surgery and underwent emergent procedure as noted above on 4/1/24 with completion above the knee amputation RLE on 4/9/24  - vascular surgery following  - to continue Plavix 75 mg daily; per vascular, patient has had no recent DVT or PE and Xarelto was maintained for the graft--thus anticoagulation beside antiplatelet not indicated;  will continue to hold Xarelto and discontinue on discharge   - Hb stable around 8-9; transfused intermittently with PRBCs (03/30 / 03/31 and 04/02 and 04/06 and 04/09 and 04/13)  - tansfuse as needed Hgb < 7.0; getting Epo and Venofer with HD per nephro  - PT / OT eval -> ARU; SW following for disposition    Rhabdomyolysis   Acute renal failure, likely contrast induced nephropathy- now hemodialysis dependent  Acute Hyponatremia  severe malnutrition  - Renal US 3/31 -> No obstruction demonstrated  - Nephrology following for severe LIMA: Likely BAILEY +/- rhabdo +/- ischemic injury with: No signs of recovery and now dialysis dependent; was hemodialysed on 4/16, getting another session on 4/17; next dialysis planned for 4/19/24; getting Epo and Venofer with HD per nephro  - Na 128---131; Cr peak 3.5---->2.01  - monitor BMP  - Avoid NSAIDs / nephrotoxins  - nutrition following; supplements per nutrition    CAD (history of MI x3)  HTN  HLP  History of Takotsubo CM  H/o SVT  *Last coronary angiogram completed 10/2023.    - Continue PTA Coreg 6.25 mg BID, hold lisinopril 10 mg/d due to LIMA, continue Norvasc 5 mg/d.  Hold parameters in place.    - hold PTA rosuvastatin 40 mg/d due to acute rhabdo  - hold Jardiance 10 mg/d For now given ARF  - PRN IV hydralazine 10 mg every 4 hours for SBP > 180.    *Recovered EF (55-60%) from ECHO 11/2023.      Chronic anticoagulation therapy with history of DVT/PE  - discussion on anticoagulation as above ; plan to discontinue PTA Xarelto       Colon distention  Nausea/vomiting/ loose stools  - CT abdomen 04/03 -> Medium-sized right retroperitoneal and extraperitoneal hematoma. Evaluation for extravasation is unable to be performed without contrast. This is likely similar to slightly smaller than the CT lumbar spine although this is incompletely visualized at that time  Extensive pulmonary opacities.  - CT also noted fluid-filled distention of the small large bowel can be seen in diarrheal  "state. No obstruction  - continue with some loose stools, nausea, though improving, with no fever or pain abdomen; likely related to nutritional supplements; no clinical concern for C diff at this time  - monitor clinically  - ordered Scopolamine patch (4/18) for nausea; prn antiemetics  - will discontinue IV prn antiemetics in prep for ARU discharge    Celiac Disease  - patient was not happy with \"gluten free diet\" and had questions about her \"Celiac\" diagnosis  - reviewing the chart it seems on 3/19, she had a pill cam study with [facility] which revealed mild non-erosive red tissue in the duodenum, some atrophic type appearance that could be celiac disease  - this was followed by celiac labs on 3/20/24 (Gliadin ab and tTG IgA antibody) which were positive and thus Celiac diagnosis was established and she was recommended for gluten-free diet  - however, patient with nausea and very poor PO intake and declines Gluten free diet; switched to regular dialysis diet (4/18); suggested to consider Gluten free diet once appetite improves     MDD with anxiety  Acute Delirium-- resolved  Contrast dye allergy-- got solumedrol and benadryl per contrast dye allergy protocol.       Tobacco Use D/O   Patient currently smokes max 5 cigarettes per day.     - Counseled regarding smoking cessation that should also continue with PCP.    - Nicoderm patch ordered.    Type 2 DM  Diabetic neuropathy  *Noted diagnosis on chart review.  However, A1c of 5.2%.    - Hold PTA Jardiance 10 mg/d and gabapentin 100 mg TID due to acute renal failure  - blood glucose has been stable in 80s to 90s without need for any insulin     COPD   - continue PTA inhalers.    - Encourage use of Flutter valve/IS.       GERD  - continue Pepcid bid       Spongiotic dermatitis  *Recently seen at outpatient Derm.    - continued on PTA betamethasone cream BID.      OA  - Pain management as above.       Charcot-Breonna-Tooth foot deformity  *Noted on chart review.  No " "interventions.      Stage VALENTIN marginal zone B-cell lymphoma  *Follows with [facility] (Dr. Barrow).    - Continue outpatient surveillance (CT scan in 8/2024).         DVT Prophylaxis: PTA xarelto d/shania as above; PCD boots      Cardiac Monitoring: None  Code Status: Full Code      Diet:   Snacks/Supplements Adult: Ensure Enlive; With Meals  Fluid restriction 1200 ML FLUID  Snacks/Supplements Adult: Ensure Enlive; With Meals  Renal Diet (dialysis)        Disposition:    Medically Ready for Discharge: Anticipated Tomorrow  - cleared by vascular surgery   - planned for likely acute rehab 4/20; care coordinator following for disposition    Clinically Significant Risk Factors            # Hypomagnesemia: Lowest Mg = 1.6 mg/dL in last 2 days, will replace as needed   # Hypoalbuminemia: Lowest albumin = 1.6 g/dL at 4/15/2024  5:36 AM, will monitor as appropriate         # Hypertension: Noted on problem list           # Severe Malnutrition: based on nutrition assessment      # Financial/Environmental Concerns:                Page Me (7 am to 6 pm)    Care plan discussed with patient and nursing; also discussed with care management team              Physical Exam:      Blood pressure (!) 144/74, pulse 70, temperature 98.9  F (37.2  C), temperature source Oral, resp. rate 16, height 1.549 m (5' 1\"), weight 53.2 kg (117 lb 4.6 oz), SpO2 93%, not currently breastfeeding.  Vitals:    04/15/24 0619 04/16/24 0605 04/19/24 0549   Weight: 58.2 kg (128 lb 4.9 oz) 55.5 kg (122 lb 5.7 oz) 53.2 kg (117 lb 4.6 oz)     Vital Signs with Ranges  Temp:  [98.5  F (36.9  C)-98.9  F (37.2  C)] 98.9  F (37.2  C)  Pulse:  [59-70] 70  Resp:  [16] 16  BP: (131-144)/(54-74) 144/74  SpO2:  [89 %-95 %] 93 %  I/O's Last 24 hours  I/O last 3 completed shifts:  In: 600 [P.O.:600]  Out: 50 [Emesis/NG output:50]    Constitutional: Alert, awake and oriented ; resting comfortably in no apparent distress; appears frail    right dialysis catheter in place "   Oral cavity: Moist mucosa   Cardiovascular: Normal s1 s2, regular rate and rhythm, no murmur   Lungs: B/l clear to auscultation, no wheezes or crepitations   Abdomen: Soft, nt, nd, no guarding, rigidity or rebound; BS +   LE : LLE edema ++   Musculoskeletal/Neuro Right AKA stump in dressing; noted anterior ecchymosis   Psychiatry: normal mood and affect                Medications:        Current Facility-Administered Medications   Medication Dose Route Frequency Provider Last Rate Last Admin    - MEDICATION INSTRUCTIONS for Dialysis Patients -   Does not apply See Admin Instructions Marcin White MD        albuterol (PROVENTIL) neb solution 2.5 mg  2.5 mg Nebulization Q6H Marcin White MD   2.5 mg at 04/18/24 0709    amLODIPine (NORVASC) tablet 5 mg  5 mg Oral Daily Torrey Alegria MD   5 mg at 04/18/24 0818    betamethasone dipropionate (DIPROSONE) 0.05 % cream   Topical BID Marcin White MD   Given at 04/16/24 1944    carvedilol (COREG) tablet 6.25 mg  6.25 mg Oral BID w/meals Marcin White MD   6.25 mg at 04/18/24 1848    clopidogrel (PLAVIX) tablet 75 mg  75 mg Oral Daily Gala Morales DO   75 mg at 04/18/24 0818    Contraindications to both pharmacological and mechanical prophylaxis (must document contraindications for both in this order)   Does not apply See Admin Instructions Marcin White MD        famotidine (PEPCID) tablet 20 mg  20 mg Oral Q48H Sofia Cristobal MD   20 mg at 04/18/24 1659    fluticasone-vilanterol (BREO ELLIPTA) 200-25 MCG/ACT inhaler 1 puff  1 puff Inhalation Daily Marcin White MD   1 puff at 04/16/24 1312    heparin ANTICOAGULANT injection 5,000 Units  5,000 Units Subcutaneous Q8H Gala Morales DO   5,000 Units at 04/19/24 0219    Rigo PACK 1 packet  1 packet Oral BID 09 12 Gala Morales DO   1 packet at 04/16/24 1313    lactobacillus rhamnosus (GG) (CULTURELL) capsule 1 capsule  1 capsule Oral BID Gala Morales DO   1 capsule at 04/18/24 2058    miconazole  (MICATIN) 2 % powder   Topical BID Marcin White MD   Given at 04/18/24 2104    multivitamin RENAL (TRIPHROCAPS) capsule 1 capsule  1 capsule Oral Daily Gala Morales DO   1 capsule at 04/18/24 0818    nicotine (NICODERM CQ) 14 MG/24HR 24 hr patch 1 patch  1 patch Transdermal Daily Marcin White MD   1 patch at 04/18/24 0834    scopolamine (TRANSDERM) 72 hr patch 1 patch  1 patch Transdermal Q72H Tanner Maurer MD   1 patch at 04/18/24 1248    And    scopolamine (TRANSDERM-SCOP) Patch in Place   Transdermal Q8H Tanner Maurer MD        sodium chloride (PF) 0.9% PF flush 10-40 mL  10-40 mL Intracatheter Q8H Sofia Cristobal MD   40 mL at 04/19/24 0555     PRN Meds:   Current Facility-Administered Medications   Medication Dose Route Frequency Provider Last Rate Last Admin    acetaminophen (TYLENOL) tablet 650 mg  650 mg Oral Q4H PRN Marcin White MD   650 mg at 04/18/24 2103    dextrose 10% infusion   Intravenous Continuous PRN Marcin White MD        glucose gel 15-30 g  15-30 g Oral Q15 Min PRN Marcin White MD   15 g at 04/12/24 1735    Or    dextrose 50 % injection 25-50 mL  25-50 mL Intravenous Q15 Min PRN Marcin White MD   25 mL at 04/03/24 1648    Or    glucagon injection 1 mg  1 mg Subcutaneous Q15 Min PRN Marcin White MD        diphenoxylate-atropine (LOMOTIL) 2.5-0.025 MG per tablet 1 tablet  1 tablet Oral 4x Daily PRN Gala Morales DO   1 tablet at 04/18/24 2058    hydrALAZINE (APRESOLINE) tablet 25 mg  25 mg Oral 4x Daily PRN Shayy Law MD        HYDROmorphone (DILAUDID) injection 0.2 mg  0.2 mg Intravenous Q2H PRN Gala Lo MD        Or    HYDROmorphone (DILAUDID) injection 0.4 mg  0.4 mg Intravenous Q2H PRN Gala Lo MD   0.4 mg at 04/10/24 1419    HYDROmorphone (DILAUDID) tablet 2 mg  2 mg Oral Q4H PRN Marcin White MD   2 mg at 04/17/24 1324    HYDROmorphone (DILAUDID) tablet 4 mg  4 mg Oral Q4H PRN Marcin White MD   4 mg at 04/18/24 2103    lidocaine  (LMX4) cream   Topical Q1H PRN Kaylee Liu NP        lidocaine (LMX4) cream   Topical Q1H PRN Marcin White MD        lidocaine 1 % 0.1-1 mL  0.1-1 mL Other Q1H PRN Kaylee Liu NP        lidocaine 1 % 0.1-1 mL  0.1-1 mL Other Q1H PRN Marcin White MD        metoclopramide (REGLAN) tablet 5 mg  5 mg Oral Q6H PRN Marcin White MD        Or    metoclopramide (REGLAN) injection 5 mg  5 mg Intravenous Q6H PRN Marcin White MD   5 mg at 04/17/24 1747    naloxone (NARCAN) injection 0.2 mg  0.2 mg Intravenous Q2 Min PRN Marcin White MD        Or    naloxone (NARCAN) injection 0.4 mg  0.4 mg Intravenous Q2 Min PRN Marcin White MD        Or    naloxone (NARCAN) injection 0.2 mg  0.2 mg Intramuscular Q2 Min PRN Marcin White MD        Or    naloxone (NARCAN) injection 0.4 mg  0.4 mg Intramuscular Q2 Min PRN Marcin White MD        OLANZapine (zyPREXA) injection 5 mg  5 mg Intramuscular Daily PRN Marcin White MD        ondansetron (ZOFRAN ODT) ODT tab 4 mg  4 mg Oral Q6H PRN Marcin White MD   4 mg at 04/18/24 0825    Or    ondansetron (ZOFRAN) injection 4 mg  4 mg Intravenous Q6H PRN Marcin White MD   4 mg at 04/18/24 2110    polyethylene glycol (MIRALAX) Packet 17 g  17 g Oral Daily PRN Marcin White MD        prochlorperazine (COMPAZINE) injection 5 mg  5 mg Intravenous Q6H PRN Tanner Maurer MD        Or    prochlorperazine (COMPAZINE) tablet 5 mg  5 mg Oral Q6H PRN Tanner Maurer MD        Or    prochlorperazine (COMPAZINE) suppository 12.5 mg  12.5 mg Rectal Q12H PRN Tanner Maurer MD        Reason statin not prescribed   Does not apply DOES NOT GO TO Marcin Gomes MD        sodium chloride (PF) 0.9% PF flush 10-20 mL  10-20 mL Intracatheter q1 min prn Sofia Cristobal MD   20 mL at 04/10/24 0558    sodium chloride (PF) 0.9% PF flush 10-40 mL  10-40 mL Intracatheter Q1H PRN Sofia Cristobal MD   20 mL at 04/09/24 1831    sodium chloride (PF) 0.9% PF flush 3 mL  3 mL Intracatheter q1 min  Kaylee Junior NP        sodium chloride 0.9% BOLUS 1-250 mL  1-250 mL Intravenous Q1H Marcin Hughes MD        sodium chloride 0.9% BOLUS 1-250 mL  1-250 mL Intravenous Q1H Marcin Hughes MD        sodium chloride 0.9% BOLUS 1-250 mL  1-250 mL Intravenous Q1H Marcin Hughes MD        sodium chloride 0.9% BOLUS 1-250 mL  1-250 mL Intravenous Q1H Marcin Hughes MD                Data:      All new lab and imaging data was reviewed.   Recent Labs   Lab Test 04/19/24  0550 04/18/24  0604 04/17/24  0636 04/16/24  0642 04/15/24  0536 04/13/24  0655 04/11/24  0643 10/07/23  1704 10/07/23  0516 10/06/23  1928 10/06/23  0551   WBC  --   --  8.5 8.7 8.0   < > 5.0   < > 7.7   < > 9.3   HGB 8.3* 8.2* 8.5* 9.2* 8.9*   < > 8.4*   < > 10.0*   < > 10.8*   MCV  --   --  91 90 89   < > 86   < > 87   < > 87   PLT  --   --  225 227 202   < > 151   < > 120*   < > 177   INR  --   --   --   --   --   --  1.31*  --  1.38*  --  1.08    < > = values in this interval not displayed.      Recent Labs   Lab Test 04/19/24  0550 04/19/24  0201 04/18/24  2113 04/18/24  1843 04/18/24  0604 04/17/24  2200 04/17/24  0636 04/17/24  0635 04/16/24  0642 04/15/24  1146 04/15/24  0536   NA  --   --   --   --   --   --  131*  --  128*  --  123*   POTASSIUM 4.4  --   --   --  3.8  --  3.9  --  4.0  --  4.9   CHLORIDE  --   --   --   --   --   --  95*  --  93*  --  90*   CO2  --   --   --   --   --   --  22  --  19*  --  23   BUN  --   --   --   --   --   --  41.2*  --  48.0*  --  81.6*   CR  --   --   --   --   --   --  2.01*  --  2.55*  --  3.50*   ANIONGAP  --   --   --   --   --   --  14  --  16*  --  10   SONIA  --   --   --   --   --   --  7.7*  --  7.7*  --  7.5*   GLC  --  72 92 81  --    < > 82   < > 81   < > 84    < > = values in this interval not displayed.     Recent Labs   Lab Test 06/10/20  1243 02/16/20  1824 09/18/19  0739   TROPI <0.015 <0.015 <0.015

## 2024-04-19 NOTE — PROGRESS NOTES
Care Management Discharge Note    Discharge Date: 04/19/2024       Discharge Disposition: Acute Rehab    Discharge Services: Transportation Services via [facility]    Discharge DME:  (TBD)    Discharge Transportation: health plan transportation    Private pay costs discussed: transportation costs    Does the patient's insurance plan have a 3 day qualifying hospital stay waiver?  Yes     Which insurance plan 3 day waiver is available? Alternative insurance waiver    Will the waiver be used for post-acute placement?     PAS Confirmation Code: n/a  Patient/family educated on Medicare website which has current facility and service quality ratings: yes    Education Provided on the Discharge Plan: Yes  Persons Notified of Discharge Plans: HospitalistVICKIE RN  Patient/Family in Agreement with the Plan: yes    Handoff Referral Completed:     Additional Information:  Pt scheduled to discharge to [facility]. Pt's discharge delayed due to receiving IV medications last night and [facility] declined until tomorrow.      SW rescheduled w/c ride for tomorrow, Saturday, 4/20/24 between 11:36 am and 12:21 pm. Pt will need dialysis prior to discharge to [facility].      DAWN Mendez

## 2024-04-20 ENCOUNTER — APPOINTMENT (OUTPATIENT)
Dept: CT IMAGING | Facility: CLINIC | Age: 64
DRG: 270 | End: 2024-04-20
Attending: HOSPITALIST
Payer: COMMERCIAL

## 2024-04-20 LAB
CREAT SERPL-MCNC: 2.65 MG/DL (ref 0.51–0.95)
EGFRCR SERPLBLD CKD-EPI 2021: 19 ML/MIN/1.73M2
HGB BLD-MCNC: 7.4 G/DL (ref 11.7–15.7)
HGB BLD-MCNC: 8.6 G/DL (ref 11.7–15.7)
PLATELET # BLD AUTO: 170 10E3/UL (ref 150–450)

## 2024-04-20 PROCEDURE — 120N000001 HC R&B MED SURG/OB

## 2024-04-20 PROCEDURE — 250N000013 HC RX MED GY IP 250 OP 250 PS 637: Performed by: STUDENT IN AN ORGANIZED HEALTH CARE EDUCATION/TRAINING PROGRAM

## 2024-04-20 PROCEDURE — 250N000013 HC RX MED GY IP 250 OP 250 PS 637: Performed by: INTERNAL MEDICINE

## 2024-04-20 PROCEDURE — 74176 CT ABD & PELVIS W/O CONTRAST: CPT

## 2024-04-20 PROCEDURE — 85049 AUTOMATED PLATELET COUNT: CPT

## 2024-04-20 PROCEDURE — 250N000011 HC RX IP 250 OP 636: Mod: JZ | Performed by: INTERNAL MEDICINE

## 2024-04-20 PROCEDURE — 85018 HEMOGLOBIN: CPT | Performed by: HOSPITALIST

## 2024-04-20 PROCEDURE — 250N000011 HC RX IP 250 OP 636: Performed by: STUDENT IN AN ORGANIZED HEALTH CARE EDUCATION/TRAINING PROGRAM

## 2024-04-20 PROCEDURE — 250N000011 HC RX IP 250 OP 636: Mod: JZ | Performed by: HOSPITALIST

## 2024-04-20 PROCEDURE — 250N000009 HC RX 250

## 2024-04-20 PROCEDURE — 99233 SBSQ HOSP IP/OBS HIGH 50: CPT | Performed by: HOSPITALIST

## 2024-04-20 RX ORDER — WATER 10 ML/10ML
INJECTION INTRAMUSCULAR; INTRAVENOUS; SUBCUTANEOUS
Status: COMPLETED
Start: 2024-04-20 | End: 2024-04-20

## 2024-04-20 RX ADMIN — CARVEDILOL 6.25 MG: 6.25 TABLET, FILM COATED ORAL at 09:35

## 2024-04-20 RX ADMIN — MICONAZOLE NITRATE: 2 POWDER TOPICAL at 20:25

## 2024-04-20 RX ADMIN — WATER 10 ML: 1 INJECTION INTRAMUSCULAR; INTRAVENOUS; SUBCUTANEOUS at 17:36

## 2024-04-20 RX ADMIN — ALTEPLASE 2 MG: 2.2 INJECTION, POWDER, LYOPHILIZED, FOR SOLUTION INTRAVENOUS at 20:24

## 2024-04-20 RX ADMIN — HEPARIN SODIUM 5000 UNITS: 5000 INJECTION, SOLUTION INTRAVENOUS; SUBCUTANEOUS at 17:36

## 2024-04-20 RX ADMIN — Medication 1 CAPSULE: at 09:35

## 2024-04-20 RX ADMIN — NICOTINE 1 PATCH: 14 PATCH, EXTENDED RELEASE TRANSDERMAL at 09:34

## 2024-04-20 RX ADMIN — HEPARIN SODIUM 5000 UNITS: 5000 INJECTION, SOLUTION INTRAVENOUS; SUBCUTANEOUS at 02:02

## 2024-04-20 RX ADMIN — FLUTICASONE FUROATE AND VILANTEROL TRIFENATATE 1 PUFF: 200; 25 POWDER RESPIRATORY (INHALATION) at 09:37

## 2024-04-20 RX ADMIN — CARVEDILOL 6.25 MG: 6.25 TABLET, FILM COATED ORAL at 17:36

## 2024-04-20 RX ADMIN — ALTEPLASE 2 MG: 2.2 INJECTION, POWDER, LYOPHILIZED, FOR SOLUTION INTRAVENOUS at 17:36

## 2024-04-20 RX ADMIN — MICONAZOLE NITRATE: 2 POWDER TOPICAL at 09:35

## 2024-04-20 RX ADMIN — HEPARIN SODIUM 5000 UNITS: 5000 INJECTION, SOLUTION INTRAVENOUS; SUBCUTANEOUS at 09:35

## 2024-04-20 RX ADMIN — FAMOTIDINE 20 MG: 20 TABLET, FILM COATED ORAL at 15:26

## 2024-04-20 RX ADMIN — ALTEPLASE 2 MG: 2.2 INJECTION, POWDER, LYOPHILIZED, FOR SOLUTION INTRAVENOUS at 17:35

## 2024-04-20 RX ADMIN — Medication 1 CAPSULE: at 20:24

## 2024-04-20 RX ADMIN — AMLODIPINE BESYLATE 5 MG: 5 TABLET ORAL at 09:35

## 2024-04-20 RX ADMIN — CLOPIDOGREL BISULFATE 75 MG: 75 TABLET ORAL at 09:35

## 2024-04-20 ASSESSMENT — ACTIVITIES OF DAILY LIVING (ADL)
ADLS_ACUITY_SCORE: 35
ADLS_ACUITY_SCORE: 34
ADLS_ACUITY_SCORE: 34
ADLS_ACUITY_SCORE: 35
ADLS_ACUITY_SCORE: 34
ADLS_ACUITY_SCORE: 35
ADLS_ACUITY_SCORE: 34
ADLS_ACUITY_SCORE: 35
ADLS_ACUITY_SCORE: 34
ADLS_ACUITY_SCORE: 35
ADLS_ACUITY_SCORE: 34
ADLS_ACUITY_SCORE: 35
ADLS_ACUITY_SCORE: 34

## 2024-04-20 NOTE — PROGRESS NOTES
Care Management Follow Up    Length of Stay (days): 22    Expected Discharge Date: 04/21/2024     Concerns to be Addressed: discharge planning  anticipate [facility] when medically ready and bed  Patient plan of care discussed at interdisciplinary rounds: Yes    Anticipated Discharge Disposition: Acute Rehab     Anticipated Discharge Services: Transportation Services  Anticipated Discharge DME:  (TBD)    Patient/family educated on Medicare website which has current facility and service quality ratings: yes  Education Provided on the Discharge Plan: Yes  Patient/Family in Agreement with the Plan: yes    Referrals Placed by CM/SW:  (ARU)  Private pay costs discussed: Not applicable    Additional Information:  Writer updated by provider that patient needs to stay today and monitor hemoglobin and obtain CT scan of abdomen. Ok to plan for discharge to ARU Sunday. Writer called rehab admissions at [phone] and spoke to Corey and updated ARU on POC for today. Will recoordinate admission for tomorrow, Sunday, April 21st. Writer lastly called [facility], spoke to Pretty  and rescheduled the wheelchair ride to Sunday, April 21st with ride window of [phone].  Bedside RN updated by writer, patient updated by MD.     Leonor Mcguire, RN   [facility]   Phone [phone], [place] or [phone]

## 2024-04-20 NOTE — PROGRESS NOTES
VASCULAR SURGERY    Had a good day yesterday and through the night.  Tolerating regular diet.  Bowel movements are better controlled.    Afebrile /75.    Abdomen= soft, nontender  Left leg= still with edema.  Palpable left graft pulse (in situ femoral to above-knee popliteal))  Right AKA healing well.  Skin necrosis on anterior lateral from removal PTFE graft.   Redressed with Xeroform-fluffs.  Placed Spandage which may stay in place better than Kerlix.      Hgb= 7.4 this morning  (8.3 yesterday)    IMPRESSION: #1.  Healing right AKA.  Dressing changes every other day.     #2.  LIMA-multifactorial.  On hemodialysis.  Renal function appears to be improving.     #3.  Anemia.  May be multifactorial.  Spoke with hospitalist staff this morning.  She did have evidence of a retroperitoneal hematoma at the time of her lytic therapy when they tried to access the right aortobifemoral limb.  With the decreased hemoglobin a CT scan will be performed this morning but no contrast due to renal issues.     #4.  Was planning to go to acute rehab today.  With anemia issues this may be delayed a day.    Chadwick Lozano MD     ADDENDUM: CT just completed and reviewed.  Stable right retroperitoneal hematoma unchanged from previous CT scan.  Some thickened loops of bowel otherwise unremarkable.  Bilateral pleural effusions are noted.     -- No evidence of active bleeding.      WRO

## 2024-04-20 NOTE — PROGRESS NOTES
[facility]  Hospitalist Progress Note        Tanner Maurer MD   04/20/2024        Interval History:        - noted drift in Hb down to 7.4  - given the note of retroperitoneal bleed on CT (4/3), will repeat CT abdomen (4/20) without contrast to follow up on the bleed-- reviewed by Vascular surgery and note stable right retroperitoneal hematoma unchanged from prior; no s/o active bleed; also some thickened loops of bowel otherwise UR, moderate b/l pleural effusion (left>right)   - hemodynamically stable and respiratory status stable on room air with no increased work of breathing  - will monitor hemoglobin Q 12 hours and transfuse prn for Hb <7  - nausea improving; tolerating PO well  - will hold off on ARU discharge until 4/21         Assessment and Plan:        Shirley Hendricks is a 63 year old female with PMH of  PAD/PVD, Tobacco use D/O, CAD (history of MI x3), HTN, HLP, DM2, Neuropathy, COPD, GERD, Anemia, Spongiotic dermatitis, MDD with anxiety, Chronic anticoagulation therapy with history of DVT/PE, OA, Charcot-Breonna-Tooth foot deformity, Marginal zone B-cell lymphoma, History of SBO, History of Takotsubo CM, History of SVT who presented with right leg pain and admitted on 3/29/2024 due to acute occlusion of right LE bypass graft.    She underwent emergent right above knee amputation per vascular surgery. Hospital course further complicated by likely contrast introduced acute kidney injury requiring initiation of hemodialysis.     Right common femoral artery to mid anterior tibial artery bypass graft acute occlusion  S/p Transarticular guillotine right through the knee amputation 4/1/24  S/p completion about the knee amputation right LE 4/9/24.  PAD/PVD  Hypertension  - on admission, right arterial LE US revealed the right common femoral artery to mid anterior tibial artery bypass graft occlusion  - evaluated by vascular surgery and underwent emergent procedure as noted above on  4/1/24 with completion above the knee amputation RLE on 4/9/24  - vascular surgery following  - to continue Plavix 75 mg daily; per vascular, patient has had no recent DVT or PE and Xarelto was maintained for the graft--thus anticoagulation beside antiplatelet not indicated; will continue to hold Xarelto and discontinue on discharge   - PT / OT eval -> ARU; SW following for disposition    acute Blood Loss Anemia  Retroperitoneal hematoma  Recent GI bleed (12/2023 and admitted at [facility])  - Hb mostly stable around 8-9; transfused intermittently with PRBCs (03/30 / 03/31 and 04/02 and 04/06 and 04/09 and 04/13)  - CT from 4/3 was noted with evidence of retroperitoneal hematoma at the time of her lytic therapy when they tried to access the right aortobifemoral limb (per vascular surgery)  - on 4/20, noted drift in Hb down to 7.4  - repeated CT abdomen (4/20) without contrast to follow up on the bleed-- reviewed by Vascular surgery and note stable right retroperitoneal hematoma unchanged from prior; no s/o active bleed; also some thickened loops of bowel otherwise UR  - hemodynamically stable with no s/o active bleed  - will monitor hemoglobin Q 12 hours and transfuse prn for Hb <7  - nausea improving; tolerating PO well  - will hold off on ARU discharge until 4/21  - tansfuse as needed Hgb < 7.0; getting Epo and Venofer with HD per nephro    Bilateral pleural effusion  - CT abdomen 4/20 noted moderate b/l pleural effusion (left>right)   - respiratory status stable on room air; has been intermittently requiring 1-2 lts O2; no increased work of breathing or shortness of breath  - getting dialysis; can consider thoracentesis if symptomatic or oxygen needs worsening    Rhabdomyolysis   Acute renal failure, likely contrast induced nephropathy- now hemodialysis dependent  Acute Hyponatremia  severe malnutrition  - Renal US 3/31 -> No obstruction demonstrated  - Nephrology following for severe LIMA: Likely BAILEY +/- rhabdo  "+/- ischemic injury with: No signs of recovery and now dialysis dependent; was hemodialysed on 4/16, getting another session on 4/17; next dialysis planned for 4/19/24; getting Epo and Venofer with HD per nephro  - Na 128---131; Cr peak 3.5---->2.01  - monitor BMP intermittently with dialysis  - Avoid NSAIDs / nephrotoxins  - nutrition following; supplements per nutrition    CAD (history of MI x3)  HTN  HLP  History of Takotsubo CM  H/o SVT  *Last coronary angiogram completed 10/2023.    - Continue PTA Coreg 6.25 mg BID, hold lisinopril 10 mg/d due to LIMA, continue Norvasc 5 mg/d.  Hold parameters in place.    - hold PTA rosuvastatin 40 mg/d due to acute rhabdo  - hold Jardiance 10 mg/d For now given ARF  - PRN IV hydralazine 10 mg every 4 hours for SBP > 180.    *Recovered EF (55-60%) from ECHO 11/2023.      Chronic anticoagulation therapy with history of DVT/PE  - discussion on anticoagulation as above ; plan to discontinue PTA Xarelto       Colon distention  Nausea/vomiting/ loose stools  - CT abdomen 04/03 -> noted fluid-filled distention of the small large bowel can be seen in diarrheal state. No obstruction  - clinically improving with improving loose stools, nausea, tolerating PO well; no fever or pain abdomen; loose stools likely related to nutritional supplements; no clinical concern for C diff at this time  - repeat CT 4/20 noted circumferential wall thickening of the distal colon and rectum concerning for proctocolitis (see discussion on celiac disease below)  - monitor clinically  - ordered Scopolamine patch (4/18) for nausea; prn antiemetics  - discontinued IV prn antiemetics (4/19) in prep for ARU discharge    Celiac Disease  - patient was not happy with \"gluten free diet\" and had questions about her \"Celiac\" diagnosis  - reviewing the chart it seems on 3/19, she had a pill cam study with [facility] which revealed mild non-erosive red tissue in the duodenum, some atrophic type appearance that could be celiac " disease  - this was followed by celiac labs on 3/20/24 (Gliadin ab and tTG IgA antibody) which were positive and thus Celiac diagnosis was established and she was recommended for gluten-free diet  - however, patient with nausea and very poor PO intake and declines Gluten free diet; switched to regular dialysis diet (4/18); suggested to consider Gluten free diet once appetite improves  - repeat CT 4/20 noted circumferential wall thickening of the distal colon and rectum concerning for proctocolitis  - will have her follow up with [facility] as outpatient     MDD with anxiety  Acute Delirium-- resolved  Contrast dye allergy-- got solumedrol and benadryl per contrast dye allergy protocol.       Tobacco Use D/O   Patient currently smokes max 5 cigarettes per day.     - Counseled regarding smoking cessation that should also continue with PCP.    - Nicoderm patch ordered.    Type 2 DM  Diabetic neuropathy  *Noted diagnosis on chart review.  However, A1c of 5.2%.    - Hold PTA Jardiance 10 mg/d and gabapentin 100 mg TID due to acute renal failure  - blood glucose has been stable in 80s to 90s without need for any insulin     COPD   - continue PTA inhalers.    - Encourage use of Flutter valve/IS.       GERD  - continue Pepcid bid       Spongiotic dermatitis  *Recently seen at outpatient Derm.    - continued on PTA betamethasone cream BID.      OA  - Pain management as above.       Charcot-Breonna-Tooth foot deformity  *Noted on chart review.  No interventions.      Stage VALENTIN marginal zone B-cell lymphoma  *Follows with [facility] (Dr. Barrow).    - Continue outpatient surveillance (CT scan in 8/2024).         DVT Prophylaxis: PTA xarelto d/shania as above; PCD boots      Cardiac Monitoring: None  Code Status: Full Code      Diet:   Snacks/Supplements Adult: Ensure Enlive; With Meals  Fluid restriction 1200 ML FLUID  Snacks/Supplements Adult: Ensure Enlive; With Meals  Renal Diet (dialysis)        Disposition:    Medically Ready  "for Discharge: Anticipated Tomorrow  - cleared by vascular surgery   - planned for likely acute rehab 4/21 pending stable HB; care coordinator following for disposition    Clinically Significant Risk Factors              # Hypoalbuminemia: Lowest albumin = 1.6 g/dL at 4/15/2024  5:36 AM, will monitor as appropriate         # Hypertension: Noted on problem list           # Severe Malnutrition: based on nutrition assessment      # Financial/Environmental Concerns:                Page Me (7 am to 6 pm)    High medical complexity    Care plan discussed with patient and nursing; also discussed with care management team and Dr. Lozano from vascular surgery    Care plan also updated in detail to her daughter Malu over the phone              Physical Exam:      Blood pressure (!) 152/62, pulse 66, temperature 98.7  F (37.1  C), temperature source Oral, resp. rate 16, height 1.549 m (5' 1\"), weight 53.2 kg (117 lb 4.6 oz), SpO2 93%, not currently breastfeeding.  Vitals:    04/15/24 0619 04/16/24 0605 04/19/24 0549   Weight: 58.2 kg (128 lb 4.9 oz) 55.5 kg (122 lb 5.7 oz) 53.2 kg (117 lb 4.6 oz)     Vital Signs with Ranges  Temp:  [97.7  F (36.5  C)-98.9  F (37.2  C)] 98.7  F (37.1  C)  Pulse:  [58-71] 66  Resp:  [11-26] 16  BP: (103-193)/(47-80) 152/62  SpO2:  [89 %-100 %] 93 %  I/O's Last 24 hours  I/O last 3 completed shifts:  In: -   Out: 3000 [Other:3000]    Constitutional: Alert, awake and oriented ; resting comfortably in no apparent distress    right dialysis catheter in place   Oral cavity: Moist mucosa   Cardiovascular: Normal s1 s2, regular rate and rhythm, no murmur   Lungs: B/l clear to auscultation, no wheezes or crepitations   Abdomen: Soft, nt, nd, no guarding, rigidity or rebound; BS +   LE : LLE edema ++   Musculoskeletal/Neuro Right AKA stump in dressing; noted anterior necrosis at prior graft site; dressed per vascular   Psychiatry: normal mood and affect                Medications:        Current " Facility-Administered Medications   Medication Dose Route Frequency Provider Last Rate Last Admin    - MEDICATION INSTRUCTIONS for Dialysis Patients -   Does not apply See Admin Instructions Marcin White MD        amLODIPine (NORVASC) tablet 5 mg  5 mg Oral Daily Torrey Alegria MD   5 mg at 04/19/24 1300    betamethasone dipropionate (DIPROSONE) 0.05 % cream   Topical BID Marcin White MD   Given at 04/16/24 1944    carvedilol (COREG) tablet 6.25 mg  6.25 mg Oral BID w/meals Marcin White MD   6.25 mg at 04/19/24 1745    clopidogrel (PLAVIX) tablet 75 mg  75 mg Oral Daily Gala Morales DO   75 mg at 04/19/24 0817    Contraindications to both pharmacological and mechanical prophylaxis (must document contraindications for both in this order)   Does not apply See Admin Instructions Marcin White MD        famotidine (PEPCID) tablet 20 mg  20 mg Oral Q48H LevarSofia walden MD   20 mg at 04/18/24 1659    fluticasone-vilanterol (BREO ELLIPTA) 200-25 MCG/ACT inhaler 1 puff  1 puff Inhalation Daily Marcin White MD   1 puff at 04/19/24 1300    heparin ANTICOAGULANT injection 5,000 Units  5,000 Units Subcutaneous Q8H Gala Morales,    5,000 Units at 04/20/24 0202    Rigo PACK 1 packet  1 packet Oral BID 09 12 Gala Morales, DO   1 packet at 04/16/24 1313    lactobacillus rhamnosus (GG) (CULTURELL) capsule 1 capsule  1 capsule Oral BID Gala Morales, DO   1 capsule at 04/19/24 2127    miconazole (MICATIN) 2 % powder   Topical BID Marcin White MD   Given at 04/19/24 2127    multivitamin RENAL (TRIPHROCAPS) capsule 1 capsule  1 capsule Oral Daily Gala Morales, DO   1 capsule at 04/18/24 0818    nicotine (NICODERM CQ) 14 MG/24HR 24 hr patch 1 patch  1 patch Transdermal Daily Marcin White MD   1 patch at 04/19/24 0817    scopolamine (TRANSDERM) 72 hr patch 1 patch  1 patch Transdermal Q72H Tanner Maurer MD   1 patch at 04/18/24 1248    And    scopolamine (TRANSDERM-SCOP) Patch in Place    Transdermal Q8H Tanner Maurer MD        sodium chloride (PF) 0.9% PF flush 10-40 mL  10-40 mL Intracatheter Q8H Sofia Cristobal MD   20 mL at 04/19/24 2127     PRN Meds:   Current Facility-Administered Medications   Medication Dose Route Frequency Provider Last Rate Last Admin    acetaminophen (TYLENOL) tablet 650 mg  650 mg Oral Q4H PRN Marcin White MD   650 mg at 04/18/24 2103    albuterol (PROVENTIL) neb solution 2.5 mg  2.5 mg Nebulization Q6H PRN Tamica Judd MD        dextrose 10% infusion   Intravenous Continuous PRN Marcin White MD        glucose gel 15-30 g  15-30 g Oral Q15 Min PRN Marcin White MD   15 g at 04/12/24 1735    Or    dextrose 50 % injection 25-50 mL  25-50 mL Intravenous Q15 Min PRN Marcin White MD   25 mL at 04/03/24 1648    Or    glucagon injection 1 mg  1 mg Subcutaneous Q15 Min PRN Marcin White MD        diphenoxylate-atropine (LOMOTIL) 2.5-0.025 MG per tablet 1 tablet  1 tablet Oral 4x Daily PRN Gala Morales DO   1 tablet at 04/18/24 2058    hydrALAZINE (APRESOLINE) tablet 25 mg  25 mg Oral 4x Daily PRN Shayy Law MD        HYDROmorphone (DILAUDID) injection 0.2 mg  0.2 mg Intravenous Q2H PRN Gala Lo MD        Or    HYDROmorphone (DILAUDID) injection 0.4 mg  0.4 mg Intravenous Q2H PRN Gala Lo MD   0.4 mg at 04/10/24 1419    HYDROmorphone (DILAUDID) tablet 2 mg  2 mg Oral Q4H PRN Marcin White MD   2 mg at 04/17/24 1324    HYDROmorphone (DILAUDID) tablet 4 mg  4 mg Oral Q4H PRN Marcin White MD   4 mg at 04/18/24 2103    lidocaine (LMX4) cream   Topical Q1H PRN Kaylee Liu NP        lidocaine (LMX4) cream   Topical Q1H PRN Marcin White MD        lidocaine 1 % 0.1-1 mL  0.1-1 mL Other Q1H PRN Kaylee Liu NP        lidocaine 1 % 0.1-1 mL  0.1-1 mL Other Q1H PRN Marcin White MD        metoclopramide (REGLAN) tablet 5 mg  5 mg Oral Q6H PRN Marcin White MD        naloxone (NARCAN) injection 0.2 mg  0.2 mg Intravenous Q2 Min  PRN Marcin White MD        Or    naloxone (NARCAN) injection 0.4 mg  0.4 mg Intravenous Q2 Min PRN Marcin White MD        Or    naloxone (NARCAN) injection 0.2 mg  0.2 mg Intramuscular Q2 Min PRN Marcin White MD        Or    naloxone (NARCAN) injection 0.4 mg  0.4 mg Intramuscular Q2 Min PRN Marcin White MD        OLANZapine (zyPREXA) injection 5 mg  5 mg Intramuscular Daily PRN Marcin White MD        ondansetron (ZOFRAN ODT) ODT tab 4 mg  4 mg Oral Q6H PRN Marcin White MD   4 mg at 04/18/24 0825    polyethylene glycol (MIRALAX) Packet 17 g  17 g Oral Daily PRN Marcin White MD        prochlorperazine (COMPAZINE) tablet 5 mg  5 mg Oral Q6H PRN Tanner Maurer MD        Or    prochlorperazine (COMPAZINE) suppository 12.5 mg  12.5 mg Rectal Q12H PRN Tanner Maurer MD        Reason statin not prescribed   Does not apply DOES NOT GO TO Marcin Gomes MD        sodium chloride (PF) 0.9% PF flush 10-20 mL  10-20 mL Intracatheter q1 min prn Sofia Cristobal MD   20 mL at 04/10/24 0558    sodium chloride (PF) 0.9% PF flush 10-40 mL  10-40 mL Intracatheter Q1H PRN Sofia Cristobal MD   20 mL at 04/09/24 1831    sodium chloride (PF) 0.9% PF flush 3 mL  3 mL Intracatheter q1 min prn Kaylee Liu NP        sodium chloride 0.9% BOLUS 1-250 mL  1-250 mL Intravenous Q1H PRN Marcin White MD                Data:      All new lab and imaging data was reviewed.   Recent Labs   Lab Test 04/20/24  0645 04/19/24  0550 04/18/24  0604 04/17/24  0636 04/16/24  0642 04/15/24  0536 04/13/24  0655 04/11/24  0643 10/07/23  1704 10/07/23  0516 10/06/23  1928 10/06/23  0551   WBC  --   --   --  8.5 8.7 8.0   < > 5.0   < > 7.7   < > 9.3   HGB 7.4* 8.3* 8.2* 8.5* 9.2* 8.9*   < > 8.4*   < > 10.0*   < > 10.8*   MCV  --   --   --  91 90 89   < > 86   < > 87   < > 87   PLT  --   --   --  225 227 202   < > 151   < > 120*   < > 177   INR  --   --   --   --   --   --   --  1.31*  --  1.38*  --  1.08    < > = values in this  interval not displayed.      Recent Labs   Lab Test 04/19/24  2132 04/19/24  0550 04/19/24  0201 04/18/24  2113 04/18/24  1843 04/18/24  0604 04/17/24  2200 04/17/24  0636 04/17/24  0635 04/16/24  0642 04/15/24  1146 04/15/24  0536   NA  --   --   --   --   --   --   --  131*  --  128*  --  123*   POTASSIUM  --  4.4  --   --   --  3.8  --  3.9  --  4.0  --  4.9   CHLORIDE  --   --   --   --   --   --   --  95*  --  93*  --  90*   CO2  --   --   --   --   --   --   --  22  --  19*  --  23   BUN  --   --   --   --   --   --   --  41.2*  --  48.0*  --  81.6*   CR  --   --   --   --   --   --   --  2.01*  --  2.55*  --  3.50*   ANIONGAP  --   --   --   --   --   --   --  14  --  16*  --  10   SONIA  --   --   --   --   --   --   --  7.7*  --  7.7*  --  7.5*   GLC 81  --  72 92   < >  --    < > 82   < > 81   < > 84    < > = values in this interval not displayed.     Recent Labs   Lab Test 06/10/20  1243 02/16/20  1824 09/18/19  0739   TROPI <0.015 <0.015 <0.015

## 2024-04-20 NOTE — PLAN OF CARE
Date & Time: 1900-0700  Surgery/POD#: POD 19 from R AKA   Behavior & Aggression: Green   Fall Risk: Yes   Orientation:A&Ox4   ABNL VS/O2:VSS on RA   Pain Management: Denies need for pain meds   Bowel/Bladder: One BM during shift, passing gas   Drains: PICC SL. HD port HL   Wounds/incisions: R stump dressing  Diet:Renal diet, 1200ml fluid restriction. Poor appetitie   Activity Level: Ax2 yaa travis. Encouraged weight shifting   Anticipated  DC Date: Today to ARU   Significant Information: Denies N/V overnight.

## 2024-04-21 ENCOUNTER — APPOINTMENT (OUTPATIENT)
Dept: PHYSICAL THERAPY | Facility: CLINIC | Age: 64
DRG: 270 | End: 2024-04-21
Payer: COMMERCIAL

## 2024-04-21 PROCEDURE — 250N000013 HC RX MED GY IP 250 OP 250 PS 637: Performed by: STUDENT IN AN ORGANIZED HEALTH CARE EDUCATION/TRAINING PROGRAM

## 2024-04-21 PROCEDURE — 250N000009 HC RX 250

## 2024-04-21 PROCEDURE — 99232 SBSQ HOSP IP/OBS MODERATE 35: CPT | Performed by: HOSPITALIST

## 2024-04-21 PROCEDURE — 97530 THERAPEUTIC ACTIVITIES: CPT | Mod: GP | Performed by: PHYSICAL THERAPIST

## 2024-04-21 PROCEDURE — 250N000011 HC RX IP 250 OP 636: Performed by: STUDENT IN AN ORGANIZED HEALTH CARE EDUCATION/TRAINING PROGRAM

## 2024-04-21 PROCEDURE — 250N000011 HC RX IP 250 OP 636: Mod: JZ | Performed by: INTERNAL MEDICINE

## 2024-04-21 PROCEDURE — 120N000001 HC R&B MED SURG/OB

## 2024-04-21 PROCEDURE — 250N000013 HC RX MED GY IP 250 OP 250 PS 637: Performed by: INTERNAL MEDICINE

## 2024-04-21 PROCEDURE — 999N000040 HC STATISTIC CONSULT NO CHARGE VASC ACCESS

## 2024-04-21 PROCEDURE — 250N000009 HC RX 250: Performed by: HOSPITALIST

## 2024-04-21 PROCEDURE — 97110 THERAPEUTIC EXERCISES: CPT | Mod: GP | Performed by: PHYSICAL THERAPIST

## 2024-04-21 RX ORDER — WATER 10 ML/10ML
INJECTION INTRAMUSCULAR; INTRAVENOUS; SUBCUTANEOUS
Status: COMPLETED
Start: 2024-04-21 | End: 2024-04-21

## 2024-04-21 RX ADMIN — MICONAZOLE NITRATE: 2 POWDER TOPICAL at 21:42

## 2024-04-21 RX ADMIN — CLOPIDOGREL BISULFATE 75 MG: 75 TABLET ORAL at 09:42

## 2024-04-21 RX ADMIN — CARVEDILOL 6.25 MG: 6.25 TABLET, FILM COATED ORAL at 09:42

## 2024-04-21 RX ADMIN — NICOTINE 1 PATCH: 14 PATCH, EXTENDED RELEASE TRANSDERMAL at 09:43

## 2024-04-21 RX ADMIN — MICONAZOLE NITRATE: 2 POWDER TOPICAL at 09:42

## 2024-04-21 RX ADMIN — CARVEDILOL 6.25 MG: 6.25 TABLET, FILM COATED ORAL at 17:00

## 2024-04-21 RX ADMIN — Medication 1 CAPSULE: at 21:41

## 2024-04-21 RX ADMIN — Medication 1 CAPSULE: at 09:42

## 2024-04-21 RX ADMIN — HEPARIN SODIUM 5000 UNITS: 5000 INJECTION, SOLUTION INTRAVENOUS; SUBCUTANEOUS at 09:43

## 2024-04-21 RX ADMIN — SCOPALAMINE 1 PATCH: 1 PATCH, EXTENDED RELEASE TRANSDERMAL at 09:44

## 2024-04-21 RX ADMIN — WATER 10 ML: 1 INJECTION INTRAMUSCULAR; INTRAVENOUS; SUBCUTANEOUS at 06:42

## 2024-04-21 RX ADMIN — ALTEPLASE 2 MG: 2.2 INJECTION, POWDER, LYOPHILIZED, FOR SOLUTION INTRAVENOUS at 06:37

## 2024-04-21 RX ADMIN — HEPARIN SODIUM 5000 UNITS: 5000 INJECTION, SOLUTION INTRAVENOUS; SUBCUTANEOUS at 01:08

## 2024-04-21 RX ADMIN — AMLODIPINE BESYLATE 5 MG: 5 TABLET ORAL at 09:42

## 2024-04-21 RX ADMIN — HEPARIN SODIUM 5000 UNITS: 5000 INJECTION, SOLUTION INTRAVENOUS; SUBCUTANEOUS at 17:00

## 2024-04-21 ASSESSMENT — ACTIVITIES OF DAILY LIVING (ADL)
ADLS_ACUITY_SCORE: 35
ADLS_ACUITY_SCORE: 34
ADLS_ACUITY_SCORE: 34
ADLS_ACUITY_SCORE: 35
ADLS_ACUITY_SCORE: 34

## 2024-04-21 NOTE — PLAN OF CARE
Date& Time: 04/21/24 8248-2823  Surgery/POD#: POD #20 s/p R AKA  Orientation: A&Ox4  ABNL VS/O2: VSS on RA  ABNL Labs: Hgb 8.6  Pain Management: Declines  Bowel/Bladder: Anuric d/t dialysis, inc BM  Drains: PICC SL. HD port   Wounds/incisions: R stump dressing w/ minimal drainage. Mepilex to coccyx CDI.  Diet: Renal diet, 1200 ml fluid restriction. Continues to have poor appetite & poor PO intake.  Activity Level: Level: Ax2 yaa travis. Encouraged weight shifting   Tests/Procedures: N/A  Anticipated  DC Date: ARU Monday 4/22 between 6026-6504    Significant Information: ARU no longer had bed Sunday 12/21 will discharge tomorrow if bed available. Dressing CDI, to be changed every other day.

## 2024-04-21 NOTE — PROGRESS NOTES
Chart reviewed.   NOted discharge plan switched to tom.   Plan to resume TTS dialysis at ARU on 4/23  On room air.  Remains oligoanuric.    Nereida Vaz MD  [facility] - Nephrology   [phone]

## 2024-04-21 NOTE — PROGRESS NOTES
[facility]  Hospitalist Progress Note        Tanner Maurer MD   04/21/2024        Interval History:        - repeat Hb 4/20 noted better at 8.6 without any transfusion  - no acute issues overnight  - awaiting transfer to ARU pending bed availability         Assessment and Plan:        Shirley Hendricks is a 63 year old female with PMH of  PAD/PVD, Tobacco use D/O, CAD (history of MI x3), HTN, HLP, DM2, Neuropathy, COPD, GERD, Anemia, Spongiotic dermatitis, MDD with anxiety, Chronic anticoagulation therapy with history of DVT/PE, OA, Charcot-Breonna-Tooth foot deformity, Marginal zone B-cell lymphoma, History of SBO, History of Takotsubo CM, History of SVT who presented with right leg pain and admitted on 3/29/2024 due to acute occlusion of right LE bypass graft.    She underwent emergent right above knee amputation per vascular surgery. Hospital course further complicated by likely contrast introduced acute kidney injury requiring initiation of hemodialysis.     Right common femoral artery to mid anterior tibial artery bypass graft acute occlusion  S/p Transarticular guillotine right through the knee amputation 4/1/24  S/p completion about the knee amputation right LE 4/9/24.  PAD/PVD  Hypertension  - on admission, right arterial LE US revealed the right common femoral artery to mid anterior tibial artery bypass graft occlusion  - evaluated by vascular surgery and underwent emergent procedure as noted above on 4/1/24 with completion above the knee amputation RLE on 4/9/24  - vascular surgery following  - to continue Plavix 75 mg daily; per vascular, patient has had no recent DVT or PE and Xarelto was maintained for the graft--thus anticoagulation beside antiplatelet not indicated; will continue to hold Xarelto and discontinue on discharge   - PT / OT eval -> ARU; SW following for disposition    acute Blood Loss Anemia  Retroperitoneal hematoma  Recent GI bleed (12/2023 and admitted at  [facility])  - Hb mostly stable around 8-9; transfused intermittently with PRBCs (03/30 / 03/31 and 04/02 and 04/06 and 04/09 and 04/13)  - CT from 4/3 was noted with evidence of retroperitoneal hematoma at the time of her lytic therapy when they tried to access the right aortobifemoral limb (per vascular surgery)  - on 4/20, noted drift in Hb down to 7.4, repeat Hb 4/20 noted better at 8.6 without any transfusion  - repeated CT abdomen (4/20) without contrast to follow up on the bleed-- reviewed by Vascular surgery and note stable right retroperitoneal hematoma unchanged from prior; no s/o active bleed; also some thickened loops of bowel otherwise UR  - hemodynamically stable with no s/o active bleed  - will monitor hemoglobin intermittently and transfuse prn for Hb <7  - nausea improving; tolerating PO well  - getting Epo and Venofer with HD per nephro    Bilateral pleural effusion  - CT abdomen 4/20 noted moderate b/l pleural effusion (left>right)   - respiratory status stable on room air; has been intermittently requiring 1-2 lts O2; no increased work of breathing or shortness of breath  - getting dialysis; can consider thoracentesis if symptomatic or oxygen needs worsening    Rhabdomyolysis   Acute renal failure, likely contrast induced nephropathy- now hemodialysis dependent  Acute Hyponatremia  severe malnutrition  - Renal US 3/31 -> No obstruction demonstrated  - Nephrology following for severe LIMA: Likely BAILEY +/- rhabdo +/- ischemic injury with: No signs of recovery and now dialysis dependent; was hemodialysed on 4/16, getting another session on 4/17; next dialysis planned for 4/19/24; getting Epo and Venofer with HD per nephro  - Na 128---131; on presentation creatinine normal at 0.6 , peaked to 3.6---->2.01--2.65  - monitor BMP intermittently with dialysis  - Avoid NSAIDs / nephrotoxins  - nutrition following; supplements per nutrition    CAD (history of MI x3)  HTN  HLP  History of Takotsubo CM  H/o  "SVT  *Last coronary angiogram completed 10/2023.    - Continue PTA Coreg 6.25 mg BID, hold lisinopril 10 mg/d due to LIMA, continue Norvasc 5 mg/d.  Hold parameters in place.    - hold PTA rosuvastatin 40 mg/d due to acute rhabdo  - hold Jardiance 10 mg/d For now given ARF  - PRN IV hydralazine 10 mg every 4 hours for SBP > 180.    *Recovered EF (55-60%) from ECHO 11/2023.      Chronic anticoagulation therapy with history of DVT/PE  - discussion on anticoagulation as above ; plan to discontinue PTA Xarelto       Colon distention  Nausea/vomiting/ loose stools  - CT abdomen 04/03 -> noted fluid-filled distention of the small large bowel can be seen in diarrheal state. No obstruction  - clinically improving with improving loose stools, nausea, tolerating PO well; no fever or pain abdomen; loose stools likely related to nutritional supplements; no clinical concern for C diff at this time  - repeat CT 4/20 noted circumferential wall thickening of the distal colon and rectum concerning for proctocolitis (see discussion on celiac disease below)  - monitor clinically  - ordered Scopolamine patch (4/18) for nausea; prn antiemetics  - discontinued IV prn antiemetics (4/19) in prep for ARU discharge    Celiac Disease  - patient was not happy with \"gluten free diet\" and had questions about her \"Celiac\" diagnosis  - reviewing the chart it seems on 3/19, she had a pill cam study with [facility] which revealed mild non-erosive red tissue in the duodenum, some atrophic type appearance that could be celiac disease  - this was followed by celiac labs on 3/20/24 (Gliadin ab and tTG IgA antibody) which were positive and thus Celiac diagnosis was established and she was recommended for gluten-free diet  - however, patient with nausea and very poor PO intake and declines Gluten free diet; switched to regular dialysis diet (4/18); suggested to consider Gluten free diet once appetite improves  - repeat CT 4/20 noted circumferential wall " thickening of the distal colon and rectum concerning for proctocolitis  - will have her follow up with [facility] as outpatient     MDD with anxiety  Acute Delirium-- resolved  Contrast dye allergy-- got solumedrol and benadryl per contrast dye allergy protocol.       Tobacco Use D/O   Patient currently smokes max 5 cigarettes per day.     - Counseled regarding smoking cessation that should also continue with PCP.    - Nicoderm patch ordered.    Type 2 DM  Diabetic neuropathy  *Noted diagnosis on chart review.  However, A1c of 5.2%.    - Hold PTA Jardiance 10 mg/d and gabapentin 100 mg TID due to acute renal failure  - blood glucose has been stable in 80s to 90s without need for any insulin     COPD   - continue PTA inhalers.    - Encourage use of Flutter valve/IS.       GERD  - continue Pepcid bid       Spongiotic dermatitis  *Recently seen at outpatient Derm.    - continued on PTA betamethasone cream BID.      OA  - Pain management as above.       Charcot-Breonna-Tooth foot deformity  *Noted on chart review.  No interventions.      Stage VALENTIN marginal zone B-cell lymphoma  *Follows with [facility] (Dr. Barrow).    - Continue outpatient surveillance (CT scan in 8/2024).         DVT Prophylaxis: PTA xarelto d/shania as above; PCD boots      Cardiac Monitoring: None  Code Status: Full Code      Diet:   Snacks/Supplements Adult: Ensure Enlive; With Meals  Fluid restriction 1200 ML FLUID  Snacks/Supplements Adult: Ensure Enlive; With Meals  Renal Diet (dialysis)        Disposition:    Medically Ready for Discharge: Ready Now  - cleared by vascular surgery   - medically stable for discharge to acute rehab pending bed availability   - care coordinator following for disposition    Clinically Significant Risk Factors              # Hypoalbuminemia: Lowest albumin = 1.6 g/dL at 4/15/2024  5:36 AM, will monitor as appropriate         # Hypertension: Noted on problem list           # Severe Malnutrition: based on nutrition assessment  "     # Financial/Environmental Concerns:                Page Me (7 am to 6 pm)    Care plan discussed with patient and care management team ; also with Dr. Lozano from vascular surgery  Care plan also updated to her daughter Malu over the phone              Physical Exam:      Blood pressure 132/66, pulse 67, temperature 98.7  F (37.1  C), temperature source Oral, resp. rate 14, height 1.549 m (5' 1\"), weight 51.6 kg (113 lb 12.1 oz), SpO2 95%, not currently breastfeeding.  Vitals:    04/16/24 0605 04/19/24 0549 04/21/24 0500   Weight: 55.5 kg (122 lb 5.7 oz) 53.2 kg (117 lb 4.6 oz) 51.6 kg (113 lb 12.1 oz)     Vital Signs with Ranges  Temp:  [98.6  F (37  C)-98.8  F (37.1  C)] 98.7  F (37.1  C)  Pulse:  [67-72] 67  Resp:  [14-18] 14  BP: (132-164)/(66-75) 132/66  SpO2:  [92 %-95 %] 95 %  I/O's Last 24 hours  I/O last 3 completed shifts:  In: 460 [P.O.:460]  Out: -     Constitutional: Alert, awake and oriented ; resting comfortably in no apparent distress    right dialysis catheter in place   Oral cavity: Moist mucosa   Cardiovascular: Normal s1 s2, regular rate and rhythm, no murmur   Lungs: B/l clear to auscultation, no wheezes or crepitations   Abdomen: Soft, nt, nd, no guarding, rigidity or rebound; BS +   LE : LLE edema +   Musculoskeletal/Neuro Right AKA stump in dressing; noted anterior necrosis at prior graft site; dressed per vascular   Psychiatry: normal mood and affect                Medications:        Current Facility-Administered Medications   Medication Dose Route Frequency Provider Last Rate Last Admin    - MEDICATION INSTRUCTIONS for Dialysis Patients -   Does not apply See Admin Instructions Marcin White MD        amLODIPine (NORVASC) tablet 5 mg  5 mg Oral Daily Torrey Alegria MD   5 mg at 04/20/24 0935    betamethasone dipropionate (DIPROSONE) 0.05 % cream   Topical BID Marcin White MD   Given at 04/16/24 1944    carvedilol (COREG) tablet 6.25 mg  6.25 mg Oral BID w/meals Marcin White MD   " 6.25 mg at 04/20/24 1736    clopidogrel (PLAVIX) tablet 75 mg  75 mg Oral Daily Gala Morales, DO   75 mg at 04/20/24 0935    Contraindications to both pharmacological and mechanical prophylaxis (must document contraindications for both in this order)   Does not apply See Admin Instructions Marcin White MD        famotidine (PEPCID) tablet 20 mg  20 mg Oral Q48H Sofia Cristobal MD   20 mg at 04/20/24 1526    fluticasone-vilanterol (BREO ELLIPTA) 200-25 MCG/ACT inhaler 1 puff  1 puff Inhalation Daily Marcin White MD   1 puff at 04/20/24 0937    heparin ANTICOAGULANT injection 5,000 Units  5,000 Units Subcutaneous Q8H Antoninahumberto Gala,    5,000 Units at 04/21/24 0108    Rigo PACK 1 packet  1 packet Oral BID 09 12 Carmen Gala, DO   1 packet at 04/16/24 1313    lactobacillus rhamnosus (GG) (CULTURELL) capsule 1 capsule  1 capsule Oral BID Carmen Gala, DO   1 capsule at 04/20/24 2024    miconazole (MICATIN) 2 % powder   Topical BID Marcin White MD   Given at 04/20/24 2025    multivitamin RENAL (TRIPHROCAPS) capsule 1 capsule  1 capsule Oral Daily Carmen Gala, DO   1 capsule at 04/20/24 0935    nicotine (NICODERM CQ) 14 MG/24HR 24 hr patch 1 patch  1 patch Transdermal Daily Marcin White MD   1 patch at 04/20/24 0934    scopolamine (TRANSDERM) 72 hr patch 1 patch  1 patch Transdermal Q72H Tanner Maurer MD   1 patch at 04/18/24 1248    And    scopolamine (TRANSDERM-SCOP) Patch in Place   Transdermal Q8H Tanner Maurer MD        sodium chloride (PF) 0.9% PF flush 10-40 mL  10-40 mL Intracatheter Q8H Sofia Cristobal MD   20 mL at 04/21/24 0624     PRN Meds:   Current Facility-Administered Medications   Medication Dose Route Frequency Provider Last Rate Last Admin    acetaminophen (TYLENOL) tablet 650 mg  650 mg Oral Q4H PRN Marcin White MD   650 mg at 04/18/24 2103    albuterol (PROVENTIL) neb solution 2.5 mg  2.5 mg Nebulization Q6H Tamica Chacon MD         dextrose 10% infusion   Intravenous Continuous PRN Marcin White MD        glucose gel 15-30 g  15-30 g Oral Q15 Min PRN Marcin White MD   15 g at 04/12/24 1735    Or    dextrose 50 % injection 25-50 mL  25-50 mL Intravenous Q15 Min PRN Marcin White MD   25 mL at 04/03/24 1648    Or    glucagon injection 1 mg  1 mg Subcutaneous Q15 Min PRN Marcin White MD        diphenoxylate-atropine (LOMOTIL) 2.5-0.025 MG per tablet 1 tablet  1 tablet Oral 4x Daily PRN Gala Morales DO   1 tablet at 04/18/24 2058    hydrALAZINE (APRESOLINE) tablet 25 mg  25 mg Oral 4x Daily PRN Shayy Law MD        HYDROmorphone (DILAUDID) injection 0.2 mg  0.2 mg Intravenous Q2H PRN Gala Lo MD        Or    HYDROmorphone (DILAUDID) injection 0.4 mg  0.4 mg Intravenous Q2H PRN Gala Lo MD   0.4 mg at 04/10/24 1419    HYDROmorphone (DILAUDID) tablet 2 mg  2 mg Oral Q4H PRN Marcin White MD   2 mg at 04/17/24 1324    HYDROmorphone (DILAUDID) tablet 4 mg  4 mg Oral Q4H PRN Marcin White MD   4 mg at 04/18/24 2103    lidocaine (LMX4) cream   Topical Q1H PRN Kaylee Liu NP        lidocaine (LMX4) cream   Topical Q1H PRN Marcin White MD        lidocaine 1 % 0.1-1 mL  0.1-1 mL Other Q1H PRN Kaylee Liu NP        lidocaine 1 % 0.1-1 mL  0.1-1 mL Other Q1H PRN Marcin White MD        metoclopramide (REGLAN) tablet 5 mg  5 mg Oral Q6H PRN Marcin White MD        naloxone (NARCAN) injection 0.2 mg  0.2 mg Intravenous Q2 Min PRN Marcin White MD        Or    naloxone (NARCAN) injection 0.4 mg  0.4 mg Intravenous Q2 Min PRN Marcin White MD        Or    naloxone (NARCAN) injection 0.2 mg  0.2 mg Intramuscular Q2 Min PRN Marcin White MD        Or    naloxone (NARCAN) injection 0.4 mg  0.4 mg Intramuscular Q2 Min PRN Marcin White MD        OLANZapine (zyPREXA) injection 5 mg  5 mg Intramuscular Daily PRN Marcin White MD        ondansetron (ZOFRAN ODT) ODT tab 4 mg  4 mg Oral Q6H PRN Marcin White MD   4 mg at 04/18/24 2575     polyethylene glycol (MIRALAX) Packet 17 g  17 g Oral Daily PRN Marcin White MD        prochlorperazine (COMPAZINE) tablet 5 mg  5 mg Oral Q6H PRN Tanner Maurer MD        Or    prochlorperazine (COMPAZINE) suppository 12.5 mg  12.5 mg Rectal Q12H PRN Tanner Maurer MD        Reason statin not prescribed   Does not apply DOES NOT GO TO Marcin Gomes MD        sodium chloride (PF) 0.9% PF flush 10-20 mL  10-20 mL Intracatheter q1 min prn Sofia Cristobal MD   20 mL at 04/10/24 0558    sodium chloride (PF) 0.9% PF flush 10-40 mL  10-40 mL Intracatheter Q1H PRN Sofia Cristobal MD   20 mL at 04/09/24 1831    sodium chloride (PF) 0.9% PF flush 3 mL  3 mL Intracatheter q1 min prn Kaylee Liu NP        sodium chloride 0.9% BOLUS 1-250 mL  1-250 mL Intravenous Q1H PRN Marcin White MD                Data:      All new lab and imaging data was reviewed.   Recent Labs   Lab Test 04/20/24  1821 04/20/24  0645 04/19/24  0550 04/18/24  0604 04/17/24  0636 04/16/24  0642 04/15/24  0536 04/13/24  0655 04/11/24  0643 10/07/23  1704 10/07/23  0516 10/06/23  1928 10/06/23  0551   WBC  --   --   --   --  8.5 8.7 8.0   < > 5.0   < > 7.7   < > 9.3   HGB 8.6* 7.4* 8.3*   < > 8.5* 9.2* 8.9*   < > 8.4*   < > 10.0*   < > 10.8*   MCV  --   --   --   --  91 90 89   < > 86   < > 87   < > 87   PLT  --  170  --   --  225 227 202   < > 151   < > 120*   < > 177   INR  --   --   --   --   --   --   --   --  1.31*  --  1.38*  --  1.08    < > = values in this interval not displayed.      Recent Labs   Lab Test 04/19/24  2132 04/19/24  0550 04/19/24  0201 04/18/24  2113 04/18/24  1843 04/18/24  0604 04/17/24  2200 04/17/24  0636 04/17/24  0635 04/16/24  0642 04/15/24  1146 04/15/24  0536   NA  --   --   --   --   --   --   --  131*  --  128*  --  123*   POTASSIUM  --  4.4  --   --   --  3.8  --  3.9  --  4.0  --  4.9   CHLORIDE  --   --   --   --   --   --   --  95*  --  93*  --  90*   CO2  --   --   --   --   --   --    --  22  --  19*  --  23   BUN  --   --   --   --   --   --   --  41.2*  --  48.0*  --  81.6*   CR  --  2.65*  --   --   --   --   --  2.01*  --  2.55*  --  3.50*   ANIONGAP  --   --   --   --   --   --   --  14  --  16*  --  10   SONIA  --   --   --   --   --   --   --  7.7*  --  7.7*  --  7.5*   GLC 81  --  72 92   < >  --    < > 82   < > 81   < > 84    < > = values in this interval not displayed.     Recent Labs   Lab Test 06/10/20  1243 02/16/20  1824 09/18/19  0739   TROPI <0.015 <0.015 <0.015

## 2024-04-21 NOTE — PROGRESS NOTES
Care Management Follow Up    Length of Stay (days): 23    Expected Discharge Date: 04/21/2024     Concerns to be Addressed: discharge planning  anticipate [facility] when medically ready and bed  Patient plan of care discussed at interdisciplinary rounds: Yes    Anticipated Discharge Disposition: Acute Rehab     Anticipated Discharge Services: Transportation Services  Anticipated Discharge DME:  (TBD)    Patient/family educated on Medicare website which has current facility and service quality ratings: yes  Education Provided on the Discharge Plan: Yes  Patient/Family in Agreement with the Plan: yes    Referrals Placed by CM/SW:  (ARU)  Private pay costs discussed: Not applicable    Additional Information:  Updated by ARU Liaison that there are no beds today for admission for patient. They kindly ask to reschedule transportation for Monday, April 22.   Writer called [facility], spoke to [place] and rescheduled the wheelchair ride to Monday, April 22nd with ride window of [phone].   Provider updated, RN updated and patient updated-she will call her daughter.    Leonor Mcguire RN   [facility]   Phone [phone], [employer] or [phone]

## 2024-04-21 NOTE — PROGRESS NOTES
VASCULAR SURGERY    Doing well this morning.  Tolerating diet.  No change in right AKA wound  Abdomen= soft nontender    AM Hgb= 8.6  (no transfusions were necessary)    PLAN: Transfer to ARU possibly today but may be bed situation issue    Discussed with patient.  Will follow-up with vascular clinic for AKA.       Chadwick Lozano MD

## 2024-04-21 NOTE — PROVIDER NOTIFICATION
Md was paged for placing WOC c/s, pt was found to have an open wound in her coccyx underneath Mepilex.

## 2024-04-21 NOTE — PLAN OF CARE
Surgery/POD#: POD #19 s/p R AKA  Orientation: A&Ox4  ABNL VS/O2: VSS on RA  ABNL Labs: Hgb 7.4  Pain Management: Declines  Bowel/Bladder: Anuric d/t dialysis, inc BM  Drains: PICC SL. HD port HL.  Wounds/incisions: R stump dressing w/ minimal drainage. Mepilex to coccyx CDI.  Diet: Renal diet, 1200 ml fluid restriction. Continues to have poor appetite & poor PO intake.  Activity Level: Level: Ax2 yaa travis. Encouraged weight shifting   Tests/Procedures: Q12hr Hgb checks  Anticipated  DC Date: ARU Sunday 4/21 between 0313-4524  Significant Information: Hgb checks at 1800 and AM. CT scan showed no active bleeding, Discharge to ARU if stable. 1800 Hgb 8.6

## 2024-04-21 NOTE — PLAN OF CARE
Surgery/POD#: 20 - R AKA   Orientation:AOx4   ABNL VS/O2:VSS on RA   ABNL Labs: hgb 8.4 - recheck today   Pain Management: tylenol   Bowel/Bladder: incontinent occasionally, on dialysis   Drains/IVs: R PICC - flushing but no blood return, alteplase given @0630, HD port HL   Wounds/incisions: R stump dressing - kerlix replaced, open coccyx wound - mepilex changed   Diet: renal, 1200 ml fluid restriction   Activity Level: ax2, yaa travis    Anticipated  DC Date: pending discharge to ARU  Significant Information: lungs diminished

## 2024-04-22 ENCOUNTER — HOSPITAL ENCOUNTER (INPATIENT)
Facility: CLINIC | Age: 64
LOS: 23 days | Discharge: ANOTHER HOSPITAL | DRG: 559 | End: 2024-05-15
Attending: PHYSICAL MEDICINE & REHABILITATION | Admitting: PHYSICAL MEDICINE & REHABILITATION
Payer: COMMERCIAL

## 2024-04-22 VITALS
TEMPERATURE: 98.7 F | DIASTOLIC BLOOD PRESSURE: 59 MMHG | WEIGHT: 111.33 LBS | OXYGEN SATURATION: 95 % | RESPIRATION RATE: 16 BRPM | BODY MASS INDEX: 21.02 KG/M2 | HEART RATE: 66 BPM | SYSTOLIC BLOOD PRESSURE: 143 MMHG | HEIGHT: 61 IN

## 2024-04-22 DIAGNOSIS — Z89.611 S/P AKA (ABOVE KNEE AMPUTATION) UNILATERAL, RIGHT (H): Primary | ICD-10-CM

## 2024-04-22 DIAGNOSIS — J30.81 CHRONIC ALLERGIC RHINITIS DUE TO ANIMAL HAIR AND DANDER: ICD-10-CM

## 2024-04-22 DIAGNOSIS — I73.9 PAD (PERIPHERAL ARTERY DISEASE) (H): ICD-10-CM

## 2024-04-22 DIAGNOSIS — I10 BENIGN ESSENTIAL HYPERTENSION: ICD-10-CM

## 2024-04-22 DIAGNOSIS — K59.00 FECAL INCONTINENCE ALTERNATING WITH CONSTIPATION: ICD-10-CM

## 2024-04-22 DIAGNOSIS — K21.00 GASTROESOPHAGEAL REFLUX DISEASE WITH ESOPHAGITIS, UNSPECIFIED WHETHER HEMORRHAGE: ICD-10-CM

## 2024-04-22 DIAGNOSIS — R15.9 FECAL INCONTINENCE ALTERNATING WITH CONSTIPATION: ICD-10-CM

## 2024-04-22 DIAGNOSIS — E78.5 HYPERLIPIDEMIA LDL GOAL <70: ICD-10-CM

## 2024-04-22 LAB
MAGNESIUM SERPL-MCNC: 1.6 MG/DL (ref 1.7–2.3)
PHOSPHATE SERPL-MCNC: 3.8 MG/DL (ref 2.5–4.5)
POTASSIUM SERPL-SCNC: 4.2 MMOL/L (ref 3.4–5.3)

## 2024-04-22 PROCEDURE — 128N000003 HC R&B REHAB

## 2024-04-22 PROCEDURE — 84132 ASSAY OF SERUM POTASSIUM: CPT | Performed by: HOSPITALIST

## 2024-04-22 PROCEDURE — 250N000011 HC RX IP 250 OP 636: Performed by: STUDENT IN AN ORGANIZED HEALTH CARE EDUCATION/TRAINING PROGRAM

## 2024-04-22 PROCEDURE — 84100 ASSAY OF PHOSPHORUS: CPT | Performed by: STUDENT IN AN ORGANIZED HEALTH CARE EDUCATION/TRAINING PROGRAM

## 2024-04-22 PROCEDURE — 250N000013 HC RX MED GY IP 250 OP 250 PS 637: Performed by: STUDENT IN AN ORGANIZED HEALTH CARE EDUCATION/TRAINING PROGRAM

## 2024-04-22 PROCEDURE — 999N000190 HC STATISTIC VAT ROUNDS

## 2024-04-22 PROCEDURE — 250N000013 HC RX MED GY IP 250 OP 250 PS 637: Performed by: PHYSICAL MEDICINE & REHABILITATION

## 2024-04-22 PROCEDURE — 83735 ASSAY OF MAGNESIUM: CPT | Performed by: STUDENT IN AN ORGANIZED HEALTH CARE EDUCATION/TRAINING PROGRAM

## 2024-04-22 PROCEDURE — G0463 HOSPITAL OUTPT CLINIC VISIT: HCPCS

## 2024-04-22 PROCEDURE — 250N000011 HC RX IP 250 OP 636: Performed by: PHYSICIAN ASSISTANT

## 2024-04-22 PROCEDURE — 250N000013 HC RX MED GY IP 250 OP 250 PS 637: Performed by: PHYSICIAN ASSISTANT

## 2024-04-22 PROCEDURE — 999N000040 HC STATISTIC CONSULT NO CHARGE VASC ACCESS

## 2024-04-22 PROCEDURE — 99239 HOSP IP/OBS DSCHRG MGMT >30: CPT | Performed by: HOSPITALIST

## 2024-04-22 PROCEDURE — 99222 1ST HOSP IP/OBS MODERATE 55: CPT | Mod: AI | Performed by: PHYSICIAN ASSISTANT

## 2024-04-22 PROCEDURE — 250N000013 HC RX MED GY IP 250 OP 250 PS 637: Performed by: INTERNAL MEDICINE

## 2024-04-22 RX ORDER — NICOTINE 21 MG/24HR
1 PATCH, TRANSDERMAL 24 HOURS TRANSDERMAL DAILY
Status: DISCONTINUED | OUTPATIENT
Start: 2024-04-22 | End: 2024-05-15 | Stop reason: HOSPADM

## 2024-04-22 RX ORDER — FAMOTIDINE 20 MG/1
20 TABLET, FILM COATED ORAL
Status: DISCONTINUED | OUTPATIENT
Start: 2024-04-22 | End: 2024-05-15 | Stop reason: HOSPADM

## 2024-04-22 RX ORDER — ONDANSETRON 4 MG/1
4 TABLET, ORALLY DISINTEGRATING ORAL EVERY 6 HOURS PRN
DISCHARGE
Start: 2024-04-22

## 2024-04-22 RX ORDER — B COMPLEX, C NO.20/FOLIC ACID 1 MG
1 CAPSULE ORAL DAILY
Status: DISCONTINUED | OUTPATIENT
Start: 2024-04-23 | End: 2024-05-15 | Stop reason: HOSPADM

## 2024-04-22 RX ORDER — HYDROMORPHONE HYDROCHLORIDE 2 MG/1
2 TABLET ORAL EVERY 6 HOURS PRN
Status: DISCONTINUED | OUTPATIENT
Start: 2024-04-22 | End: 2024-05-15 | Stop reason: HOSPADM

## 2024-04-22 RX ORDER — NITROGLYCERIN 0.4 MG/1
0.4 TABLET SUBLINGUAL SEE ADMIN INSTRUCTIONS
Status: DISCONTINUED | OUTPATIENT
Start: 2024-04-22 | End: 2024-04-26

## 2024-04-22 RX ORDER — AMLODIPINE BESYLATE 2.5 MG/1
5 TABLET ORAL DAILY
Status: ON HOLD | DISCHARGE
Start: 2024-04-22 | End: 2024-05-15

## 2024-04-22 RX ORDER — NALOXONE HYDROCHLORIDE 0.4 MG/ML
0.4 INJECTION, SOLUTION INTRAMUSCULAR; INTRAVENOUS; SUBCUTANEOUS
Status: DISCONTINUED | OUTPATIENT
Start: 2024-04-22 | End: 2024-05-15 | Stop reason: HOSPADM

## 2024-04-22 RX ORDER — CARVEDILOL 6.25 MG/1
6.25 TABLET ORAL 2 TIMES DAILY WITH MEALS
Status: DISCONTINUED | OUTPATIENT
Start: 2024-04-22 | End: 2024-05-15 | Stop reason: HOSPADM

## 2024-04-22 RX ORDER — SCOLOPAMINE TRANSDERMAL SYSTEM 1 MG/1
1 PATCH, EXTENDED RELEASE TRANSDERMAL
Status: DISCONTINUED | OUTPATIENT
Start: 2024-04-22 | End: 2024-05-03

## 2024-04-22 RX ORDER — GABAPENTIN 300 MG/1
300 CAPSULE ORAL AT BEDTIME
Status: DISCONTINUED | OUTPATIENT
Start: 2024-04-22 | End: 2024-04-23

## 2024-04-22 RX ORDER — NALOXONE HYDROCHLORIDE 0.4 MG/ML
0.2 INJECTION, SOLUTION INTRAMUSCULAR; INTRAVENOUS; SUBCUTANEOUS
Status: DISCONTINUED | OUTPATIENT
Start: 2024-04-22 | End: 2024-05-15 | Stop reason: HOSPADM

## 2024-04-22 RX ORDER — AMLODIPINE BESYLATE 5 MG/1
5 TABLET ORAL DAILY
Status: DISCONTINUED | OUTPATIENT
Start: 2024-04-23 | End: 2024-05-08

## 2024-04-22 RX ORDER — B COMPLEX, C NO.20/FOLIC ACID 1 MG
1 CAPSULE ORAL DAILY
Status: ON HOLD | DISCHARGE
Start: 2024-04-23 | End: 2024-06-07

## 2024-04-22 RX ORDER — FLUTICASONE FUROATE AND VILANTEROL 200; 25 UG/1; UG/1
1 POWDER RESPIRATORY (INHALATION) DAILY
Status: DISCONTINUED | OUTPATIENT
Start: 2024-04-23 | End: 2024-05-15 | Stop reason: HOSPADM

## 2024-04-22 RX ORDER — DIPHENOXYLATE HCL/ATROPINE 2.5-.025MG
1 TABLET ORAL 4 TIMES DAILY PRN
Status: ON HOLD | DISCHARGE
Start: 2024-04-22 | End: 2024-05-15

## 2024-04-22 RX ORDER — CLOPIDOGREL BISULFATE 75 MG/1
75 TABLET ORAL DAILY
Status: DISCONTINUED | OUTPATIENT
Start: 2024-04-23 | End: 2024-05-15 | Stop reason: HOSPADM

## 2024-04-22 RX ORDER — AMOXICILLIN 250 MG
1-2 CAPSULE ORAL 2 TIMES DAILY PRN
Status: DISCONTINUED | OUTPATIENT
Start: 2024-04-22 | End: 2024-05-03

## 2024-04-22 RX ORDER — POLYETHYLENE GLYCOL 3350 17 G/17G
17 POWDER, FOR SOLUTION ORAL DAILY PRN
Status: ON HOLD | DISCHARGE
Start: 2024-04-22 | End: 2024-05-15

## 2024-04-22 RX ORDER — ONDANSETRON 4 MG/1
4 TABLET, ORALLY DISINTEGRATING ORAL EVERY 6 HOURS PRN
Status: DISCONTINUED | OUTPATIENT
Start: 2024-04-22 | End: 2024-05-15 | Stop reason: HOSPADM

## 2024-04-22 RX ORDER — POLYETHYLENE GLYCOL 3350 17 G/17G
17 POWDER, FOR SOLUTION ORAL DAILY PRN
Status: DISCONTINUED | OUTPATIENT
Start: 2024-04-22 | End: 2024-05-15 | Stop reason: HOSPADM

## 2024-04-22 RX ORDER — ACETAMINOPHEN 500 MG
500-1000 TABLET ORAL EVERY 6 HOURS PRN
Status: DISCONTINUED | OUTPATIENT
Start: 2024-04-22 | End: 2024-05-15 | Stop reason: HOSPADM

## 2024-04-22 RX ORDER — ROSUVASTATIN CALCIUM 5 MG/1
10 TABLET, COATED ORAL AT BEDTIME
Status: DISCONTINUED | OUTPATIENT
Start: 2024-04-22 | End: 2024-05-15 | Stop reason: HOSPADM

## 2024-04-22 RX ORDER — LACTOBACILLUS RHAMNOSUS GG 10B CELL
1 CAPSULE ORAL 2 TIMES DAILY
Status: DISCONTINUED | OUTPATIENT
Start: 2024-04-22 | End: 2024-05-12

## 2024-04-22 RX ORDER — HEPARIN SODIUM 5000 [USP'U]/.5ML
5000 INJECTION, SOLUTION INTRAVENOUS; SUBCUTANEOUS EVERY 12 HOURS
Status: DISCONTINUED | OUTPATIENT
Start: 2024-04-22 | End: 2024-05-15 | Stop reason: HOSPADM

## 2024-04-22 RX ADMIN — Medication 1 CAPSULE: at 21:35

## 2024-04-22 RX ADMIN — GABAPENTIN 300 MG: 300 CAPSULE ORAL at 21:35

## 2024-04-22 RX ADMIN — HYDROMORPHONE HYDROCHLORIDE 2 MG: 2 TABLET ORAL at 17:43

## 2024-04-22 RX ADMIN — HEPARIN SODIUM 5000 UNITS: 5000 INJECTION, SOLUTION INTRAVENOUS; SUBCUTANEOUS at 09:04

## 2024-04-22 RX ADMIN — MICONAZOLE NITRATE: 2 POWDER TOPICAL at 08:43

## 2024-04-22 RX ADMIN — CARVEDILOL 6.25 MG: 6.25 TABLET, FILM COATED ORAL at 17:42

## 2024-04-22 RX ADMIN — Medication 1 CAPSULE: at 08:42

## 2024-04-22 RX ADMIN — ONDANSETRON 4 MG: 4 TABLET, ORALLY DISINTEGRATING ORAL at 02:10

## 2024-04-22 RX ADMIN — NICOTINE 1 PATCH: 14 PATCH, EXTENDED RELEASE TRANSDERMAL at 08:42

## 2024-04-22 RX ADMIN — Medication 1 PACKET: at 09:25

## 2024-04-22 RX ADMIN — FAMOTIDINE 20 MG: 20 TABLET ORAL at 16:36

## 2024-04-22 RX ADMIN — CARVEDILOL 6.25 MG: 6.25 TABLET, FILM COATED ORAL at 08:42

## 2024-04-22 RX ADMIN — AMLODIPINE BESYLATE 5 MG: 5 TABLET ORAL at 08:42

## 2024-04-22 RX ADMIN — ROSUVASTATIN CALCIUM 10 MG: 5 TABLET, COATED ORAL at 21:35

## 2024-04-22 RX ADMIN — HEPARIN SODIUM 5000 UNITS: 5000 INJECTION, SOLUTION INTRAVENOUS; SUBCUTANEOUS at 21:33

## 2024-04-22 RX ADMIN — FLUTICASONE FUROATE AND VILANTEROL TRIFENATATE 1 PUFF: 200; 25 POWDER RESPIRATORY (INHALATION) at 09:05

## 2024-04-22 RX ADMIN — ACETAMINOPHEN 1000 MG: 500 TABLET ORAL at 17:42

## 2024-04-22 RX ADMIN — HEPARIN SODIUM 5000 UNITS: 5000 INJECTION, SOLUTION INTRAVENOUS; SUBCUTANEOUS at 02:12

## 2024-04-22 RX ADMIN — CLOPIDOGREL BISULFATE 75 MG: 75 TABLET ORAL at 08:42

## 2024-04-22 ASSESSMENT — ACTIVITIES OF DAILY LIVING (ADL)
ADLS_ACUITY_SCORE: 39
ADLS_ACUITY_SCORE: 35
ADLS_ACUITY_SCORE: 39
ADLS_ACUITY_SCORE: 42
ADLS_ACUITY_SCORE: 39
ADLS_ACUITY_SCORE: 24
ADLS_ACUITY_SCORE: 35
ADLS_ACUITY_SCORE: 24
ADLS_ACUITY_SCORE: 24
ADLS_ACUITY_SCORE: 39
ADLS_ACUITY_SCORE: 42
ADLS_ACUITY_SCORE: 39
ADLS_ACUITY_SCORE: 35
ADLS_ACUITY_SCORE: 39
ADLS_ACUITY_SCORE: 24
ADLS_ACUITY_SCORE: 39
ADLS_ACUITY_SCORE: 39
ADLS_ACUITY_SCORE: 35
ADLS_ACUITY_SCORE: 24
ADLS_ACUITY_SCORE: 39
ADLS_ACUITY_SCORE: 35

## 2024-04-22 NOTE — PLAN OF CARE
Surgery/POD#: 20 - R AKA   Orientation:AOx4   ABNL VS/O2:VSS on RA   ABNL Labs: hgb 8.4   Pain Management: declined   Bowel/Bladder: incontinent B&B occasionally, on dialysis   Drains/IVs: R PICC - flush, HD CVC  Wounds/incisions: R stump dressing CDI, open coccyx wound - mepilex CDI  Diet: renal, 1200 ml fluid restriction   Activity Level: ax2, yaa travis    Anticipated  DC Date: pending discharge to ARU tomorrow, ride at 1pm  Significant Information: WOC c/s, feeling bloated after dinner

## 2024-04-22 NOTE — PLAN OF CARE
Occupational Therapy Discharge Summary    Reason for therapy discharge:    Discharged to acute rehabilitation facility.    Progress towards therapy goal(s). See goals on Care Plan in [facility] electronic health record for goal details.  Goals partially met.  Barriers to achieving goals:   discharge from facility.    Therapy recommendation(s):    Continued therapy is recommended.  Rationale/Recommendations:  Pt functionally well below baseline at this time. still with therapy needs. Pt w/ improved participation and tolerance for therapy. Recommend continued therapy at ARU to progress I/ADL independence prior to return home.

## 2024-04-22 NOTE — PROGRESS NOTES
Patient discharged at 12:50 AM to Discharged to [facility]. PICC was discontinued. Pain at time of discharge was 0/10. Discharge instructions and medications reviewed with patient.  Patient verbalized understanding and all questions were answered.   At time of discharge, patient condition was stable and left the unit via wheelchair. Some belongings returned to patient. Some were left at the unit to be transported by our  services because transport could only take two bags.

## 2024-04-22 NOTE — CONSULTS
"[facility] Nurse Inpatient Assessment     Consulted for: coccyx    Summary: Patient frustrated with care, she will be transferring to TCU and did not initially want to be assessed. After discussion she was agreeable and turned well without assistance.     Patient History (according to provider note(s):    Shirley Hendricks is a 63 year old female with PMH of  PAD/PVD, Tobacco use D/O, CAD (history of MI x3), HTN, HLP, DM2, Neuropathy, COPD, GERD, Anemia, Spongiotic dermatitis, MDD with anxiety, Chronic anticoagulation therapy with history of DVT/PE, OA, Charcot-Breonna-Tooth foot deformity, Marginal zone B-cell lymphoma, History of SBO, History of Takotsubo CM, History of SVT who presented with right leg pain and admitted on 3/29/2024 due to acute occlusion of right LE bypass graft.     She underwent emergent right above knee amputation per vascular surgery. Hospital course further complicated by likely contrast introduced acute kidney injury requiring initiation of hemodialysis.    Right common femoral artery to mid anterior tibial artery bypass graft acute occlusion  S/p Transarticular guillotine right through the knee amputation 4/1/24  S/p completion about the knee amputation right LE 4/9/24.  PAD/PVD  Hypertension  acute Blood Loss Anemia  Retroperitoneal hematoma  Recent GI bleed (12/2023 and admitted at [facility])  Bilateral pleural effusion  Rhabdomyolysis   Acute renal failure, likely contrast induced nephropathy- now hemodialysis dependent  Acute Hyponatremia  severe malnutrition   CAD (history of MI x3)  HTN  HLP  History of Takotsubo CM  H/o SVT  Chronic anticoagulation therapy with history of DVT/PE  Colon distention  Nausea/vomiting/ loose stools  Celiac Disease  - patient was not happy with \"gluten free diet\" and had questions about her \"Celiac\" diagnosis  - reviewing the chart it seems on 3/19, she had a pill cam study with [facility] which revealed mild non-erosive red " tissue in the duodenum, some atrophic type appearance that could be celiac disease  - this was followed by celiac labs on 3/20/24 (Gliadin ab and tTG IgA antibody) which were positive and thus Celiac diagnosis was established and she was recommended for gluten-free diet  - however, patient with nausea and very poor PO intake and declines Gluten free diet; switched to regular dialysis diet (4/18); suggested to consider Gluten free diet once appetite improves  - repeat CT 4/20 noted circumferential wall thickening of the distal colon and rectum concerning for proctocolitis  - will have her follow up with [facility] as outpatient  MDD with anxiety  Acute Delirium-- resolved  Contrast dye allergy-- got solumedrol and benadryl per contrast dye allergy protocol.    Tobacco Use D/O   Patient currently smokes max 5 cigarettes per day.     Type 2 DM  Diabetic neuropathy   COPD   GERD  Spongiotic dermatitis  *Recently seen at outpatient Derm.    - continued on PTA betamethasone cream BID.     OA  Charcot-Breonna-Tooth foot deformity  *Noted on chart review.  No interventions.    Stage VALENTIN marginal zone B-cell lymphoma  *Follows with [facility] (Dr. Barrow).    - Continue outpatient surveillance (CT scan in 8/2024).    Assessment:      Areas visualized during today's visit: Focused: and Sacrum/coccyx    Pressure Injury Location: sacroccocygeal         Last photo: 4/22/24  Wound type: Pressure Injury, Incontinence Associated Dermatitis (IAD), and Moisture Associated Skin Damage (MASD)     Pressure Injury Stage: either Stage 2 or 3 deferring definitive staging until wound base has evolved, hospital acquired      Wound history/plan of care:  MASD related to incontinence of bladder and bowels. intergluteal crease with area of linear erythema, skin is intact. This line of erythema continues up gluteal crease to sacral area and evolves to a localized round area of erythema that is blanchable. To the center of this area is an open  "pressure related injury stage 2 vs 3, unable to determine staging at this time, area is positional over bone and within skin crevice, very little adipose tissue to this area. Base of wound with dermis to edges and yellow tissue. Patient found with brief on in bed, discussed indication for brief use, she agreed to remove due to \" I don't even want to use them but I can't get up because of my leg\". Patient has Right AKA, discussed use of bedpan and/or transferring to commode if able, she was agreeable to try this plan.     Wound base: 100 % blanchable , non-blanchable, erythema, dermis, and fibrin vs adipose ,        Palpation of the wound bed: normal      Drainage: scant     Description of drainage: serous     Measurements (length x width x depth, in cm) 1.5  x 0.5  x  0.2 cm      Periwound skin: Intact and Erythema- blanchable      Color: normal and consistent with surrounding tissue      Temperature: normal   Odor: none  Pain: denies , none  Pain intervention prior to dressing change: patient tolerated well, no significant pain present , soaking, and slow and gentle cares   Treatment goal: Heal , Infection control/prevention, Maintain (prevention of deterioration), Protection, and Promote epidermal migration  STATUS: initial assessment  Supplies ordered: at bedside, supplies stored on unit, discussed with RN, and discussed with patient     My PI Risk Assessment     Sensory Perception: 4 - No impairment     Moisture: 3 - Occasionally moist      Activity: 2 - Chairfast     Mobility: 3 - Slightly limited      Nutrition: 3 - Adequate     Friction/Shear: 1 - Problem     TOTAL: 16      Treatment Plan:     Sacral wound(s): BID and PRN for episodes of incontinence   Cleanse the area with Neno cleanse and protect, very gently with soft cloth.  Apply ostomy powder (#7639) on all open and denuded skin.  Apply thin layer of critic aid paste on top of it.  With repeat application, do not scrub the paste, only remove soiled " "paste and reapply.  If complete removal of paste is necessary use baby oil/mineral oil (#967892) and soft wash cloth.  Ensure pt has Ritchie-cushion (#487157) while sitting up in the chair.  Use only one Covidien pad in between mattress and pt.   No brief while in bed.  Add Low air loss pump to isoflex support surface  Strict PIP measures    Pressure Injury Prevention (PIP) Plan:  If patient is declining pressure injury prevention interventions: Explore reason why and address patient's concerns, Educate on pressure injury risk and prevention intervention(s), If patient is still declining, document \"informed refusal\" , and Ensure Care team is aware ( provider, charge nurse, etc)  Mattress: Follow bed algorithm, reassess daily and order specialty mattress, if indicated.  HOB: Maintain at or below 30 degrees, unless contraindicated  Repositioning in bed: Every 1-2 hours , Left/right positioning; avoid supine, Raise foot of bed prior to raising head of bed, to reduce patient sliding down (shear), and Frequent microturns using TAPS wedges, as patient tolerates  Heels: Keep elevated off mattress and Pillows under calves  Protective Dressing: Sacral Mepilex for prevention (#187087),  especially for the agitated patient   Positioning Equipment: TAPS wedges (#140634) to help maintain 30 degree side lying position   Chair positioning: Chair cushion (#970513) , Assist patient to reposition hourly, and Do NOT use a donut for sitting (this increases pressure to smaller area and creates a higher potential for injury)    If patient has a buttock pressure injury, or high risk for PI use chair cushion or SPS.  Moisture Management: Perineal cleansing /protection: Follow Incontinence Protocol, Avoid brief in bed, Clean and dry skin folds with bathing , Consider InterDry (#567155) between folds, and Moisturize dry skin  Under Devices: Inspect skin under all medical devices during skin inspection , Ensure tubes are stabilized without " tension, and Ensure patient is not lying on medical devices or equipment when repositioned  Ask provider to discontinue device when no longer needed.      Orders: Written    RECOMMEND PRIMARY TEAM ORDER: None, at this time  Education provided: importance of repositioning, plan of care, wound progress, Infection prevention , Moisture management, Hygiene, and Off-loading pressure  Discussed plan of care with: Patient and Nurse  [facility] nurse follow-up plan: twice weekly  Notify WOC if wound(s) deteriorate.  Nursing to notify the Provider(s) and re-consult the WO Nurse if new skin concern.    DATA:     Current support surface: Standard  Standard gel/foam mattress (IsoFlex, Atmos air, etc)  Containment of urine/stool: Incontinence Protocol, Brief, Incontinent pad in bed, and recommending NICOLE pump be added to isolflex support surface for moisture management  BMI: Body mass index is 21.04 kg/m .   Active diet order: Orders Placed This Encounter      Renal Diet (dialysis)     Output: I/O last 3 completed shifts:  In: 1097 [P.O.:1097]  Out: -      Labs:   Recent Labs   Lab 04/20/24  1821 04/18/24  0604 04/17/24  0636   ALBUMIN  --   --  2.3*   HGB 8.6*   < > 8.5*   WBC  --   --  8.5    < > = values in this interval not displayed.     Pressure injury risk assessment:   Sensory Perception: 4-->no impairment  Moisture: 3-->occasionally moist  Activity: 2-->chairfast  Mobility: 3-->slightly limited  Nutrition: 2-->probably inadequate  Friction and Shear: 2-->potential problem  Cesar Score: 16    Maegan Jane RN, BSN, CWON   Pager no longer is use, please contact through [employer] group: [facility] Nurse Tracy Clancyt. Office Number: [phone]

## 2024-04-22 NOTE — PROGRESS NOTES
VASCULAR SURGERY    Good day yesterday.  Tolerating diet.  Was up in chair.  Feels that she is voiding larger volumes.    Right BKA=A    PLAN: Plan to ARU this morning.  Will follow-up with us in approximately 4 weeks for suture removal and BKA.        Chadwick Lozano MD

## 2024-04-22 NOTE — PLAN OF CARE
Goal Outcome Evaluation:    A&Ox4. VSS on RA. Denies pain. C/o nausea, zofran given with relief. R PICC SL. HD port intact. R stump dressing w/ minimal drainage. Mepilex to coccyx. Tolerating renal diet, 1200ml fluid restriction. Loose/ watery stool x3. Up A2 with lift/ yaa stedy. T/R overnight. Plan for discharge to ARU today around 1pm.

## 2024-04-22 NOTE — OR NURSING
Pt was admitted to the ARU in a w/chair. Alert and oriented x4. Able to make needs known. Right AKA.  A2 lift with transfers. On a renal diet, thin liquids and able to take her pills whole. On a 1200 fluid restrictions. Incontinent of bowel and bladder. LBM 4/22. Pt on hemodialysis. Right chest dialysis port in place. No pain at the moment. Continue with POC.

## 2024-04-22 NOTE — PROGRESS NOTES
Care Management Discharge Note    Discharge Date: 04/22/2024       Discharge Disposition: Acute Rehab    Discharge Services: Transportation Services    Discharge DME:  (TBD)    Discharge Transportation: health plan transportation    Private pay costs discussed: Not applicable    Does the patient's insurance plan have a 3 day qualifying hospital stay waiver?  Yes     Which insurance plan 3 day waiver is available? Alternative insurance waiver    Will the waiver be used for post-acute placement? No    PAS Confirmation Code: n/a  Patient/family educated on Medicare website which has current facility and service quality ratings: yes    Education Provided on the Discharge Plan: Yes  Persons Notified of Discharge Plans: Patient, MD, RN  Patient/Family in Agreement with the Plan: yes    Handoff Referral Completed: No    Additional Information:  Updated by ARU Liaison that there is a  bed today for admission for patient.   Writer called [facility], spoke to Candi and confirmed  the wheelchair ride at Monday, April 22nd with ride window of [phone] to [facility] as the destination  Provider updated, RN updated and patient updated-Patient will call her daughter.    Leonor Mcguire RN   [facility]   Phone [phone], [place] ([employer]) or [phone]

## 2024-04-22 NOTE — PLAN OF CARE
Physical Therapy Discharge Summary    Reason for therapy discharge:    Discharged to acute rehabilitation facility.    Progress towards therapy goal(s). See goals on Care Plan in [facility] electronic health record for goal details.  Goals partially met.  Barriers to achieving goals:   discharge from facility.    Therapy recommendation(s):    Continued therapy is recommended.  Rationale/Recommendations:   . PT Discharge Planning:    PT Plan: Repeated sit to/from stand with ww. LE ex if tolerates.  PT Discharge Recommendation (DC Rec): Acute Rehab Center-Motivated patient will benefit from intensive, interdisciplinary therapy.  Anticipate will be able to tolerate 3 hours of therapy per day  PT Rationale for DC Rec: Pt is agreeable to participate but fatigues quickly.  Pt is independent at baseline and will benefit from ARC in order to address functional deficits and prepare for return home with family assist.  PT Brief overview of current status: Assist of 2 wiht use of Ailin Villasenor    Recommendation above provided by last treating therapist.

## 2024-04-22 NOTE — PLAN OF CARE
Speech Language Therapy Discharge Summary    Reason for therapy discharge:    Discharged to acute rehabilitation facility.    Progress towards therapy goal(s). See goals on Care Plan in [facility] electronic health record for goal details.  Goals met    Therapy recommendation(s):    No further therapy is recommended. Do not anticipate further SLP services are required for dysphagia at ARU. Pt has been consuming a regular diet and appears to be tolerating this diet. She denies having any difficulty or concerns with her swallowing and can be somewhat disgruntled when SLPs try to address swallowing.

## 2024-04-22 NOTE — PROGRESS NOTES
Patient seen and examined    - No acute issues overnight; planned for discharge to ARU 4/22    Please see discharge summary for details

## 2024-04-22 NOTE — H&P
[facility]   Acute Rehabilitation Unit  Admission History and Physical    CHIEF COMPLAINT   Amputation 05.3 Unilateral LE AKA; emergent R AKA due to acute occlusion of RLE bypass graft      HISTORY OF PRESENT ILLNESS  Shirley Hendricks is a 63 year old right hand dominant female with complicated past medical history including but not limited to PAD with multiple prior bilateral lower extremity vascular bypass grafts and thrombectomies (most recently 10/2023 right common femoral to proximal anterior tibial PTFE bypass graft), Charcot-Breonna-Tooth foot deformity bilaterally, prior bilateral carotid endarterectomies, B-cell lymphoma, CAD (hx Mix3), hypertension, hyperlipidemia, GI bleed (12/2023), type 2 diabetes mellitus, COPD, tobacco use disorder, iron deficiency anemia, GERD, Celiac disease, Takotsubo cardiomyopathy, SVT, depression/anxiety and spongiotic dermatitis who presented on 3/29/24 with 1-hour history of cool, pulseless right foot with arterial ultrasound revealing arterial graft occlusion.      Vascular surgery was consulted and initially attempted to salvage limb with catheter-directed thrombolytics.  However, patient developed hypofibrinogenemia, hematochezia, sepsis, anemia, rhabdomyolysis and worsening LIMA, all attributed to ischemic right lower extremity.  Ultimately unable to salvage and patient underwent guillotine right above-knee amputation on 4/1 with Dr. Hernandez, followed by completion on 4/9/24.    She developed worsening LIMA, felt to be related to repeated contrast exposure +/- rhabdomyolysis +/- ischemic injury on top of likely modest CKD at baseline.  Nephrology was consulted and patient was initiated on intermittent hemodialysis as of 4/3 and remains on at discharge to ARU.    Hospital course was further complicated by sepsis, acute blood loss anemia, acute post-operative pain, nausea, loose stools, delirium, malnutrition, and multiple electrolyte  "derangements.    During acute hospitalization, patient was seen and evaluated by PT and OT, who collectively recommended that patient would benefit from ongoing therapies in the acute inpatient rehabilitation setting.      In review of the therapy notes, she currently needs Min A of 2 and Ailin Steady for standing and A of 2 with Ailin Steady vs mechanical lift for transfers. Significant assistance needed for all ADL that involves mobility or lower body. SLP following pt for mild dysphagia.     Upon arrival to the rehab unit, she is accompanied by her daughter.  Patient reports increase in her left leg pain related to transfer.  She rates pain currently at 7-9/10.  Also notes some intermittent pain in her left leg.  She notes some lightheadedness when she gets up to the bathroom.  Was pleased to note she had significant urine output on voiding on arrival to ARU.  She does note some intermittent urinary incontinence related to urgency/immobility.  She notes ongoing abdominal pain throughout this admission, as recently as last night, which she said was due to \"eating too much\".  She also feels the food at the hospital tastes very salty to her.  She feels her food choices are limited by diet.  She has never done gluten-free diet and found it too restrictive in hospital.  She notes ongoing nausea, which she said can be worse depending on what she eats.  She also is having ongoing loose stools.  Reports >20 episodes yesterday, but only 2-3 today.  She denies any abdominal pain, nausea, loose stools prior to admission.  Last episode of emesis was the night before last, small per her report.  She is having some difficulty with sleep, trouble getting comfortable, pain, and racing thoughts.  She reports feeling depressed, both regarding adjusting to her disability but also due to significant psychosocial stressors at home.  She denies prior history of depression/anxiety.  She is interested in psychology and spiritual supports. "  She is worried about blood draws, notes she is a hard stick and PICC was removed before transfer.  She indicates that her daughters are her support and assist with medical decision-making.    PAST MEDICAL HISTORY   Reviewed and updated in Epic.  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 clot    Post-menopausal     Reflux esophagitis 02/2004    EGD: esophagitis and medium HH    SBO (small bowel obstruction) (H) 08/10/2021    SVT (supraventricular tachycardia) (H24)     Thrombocytopenia (H24)     Uncomplicated asthma     Vitamin C deficiency 08/12/2018       SURGICAL HISTORY  Reviewed and updated in Epic.  Past Surgical History:   Procedure Laterality Date    AMPUTATE LEG ABOVE KNEE N/A 4/1/2024    Procedure: AMPUTATION, ABOVE KNEE right leg with wound vac dressing, excision of anteriotibial bypass graft, closure of (previous interventional radiology) left groin incision;  Surgeon: José Luis Hernandez MD;  Location:  OR    AMPUTATE LEG ABOVE KNEE Right 4/9/2024    Procedure: COMPLETION RIGHT ABOVE KNEE AMPUTATION;  Surgeon: José Luis Hernandez MD;  Location:  OR    ANGIOGRAM      ANGIOGRAM Right 6/23/2021    Procedure: RIGHT LOWER  EXTREMITY ANGIOGRAM WITH LEFT BRACHIAL ARTERY CUTDOWN;  Surgeon: José Luis Hernandez MD;  Location:  OR    BIOPSY MASS NECK Right 10/11/2023    Procedure: Right Parotid Biopsy;  Surgeon: Randal Mendoza MD;  Location:  OR    BYPASS GRAFT FEMOROPOPLITEAL Right 09/23/2020    Procedure: RIGHT FEMORAL GRAFT TO ABOVE-KNEE POPLITEAL BYPASS USING CADAVERIC SUPERFICIAL FEMORAL ARTERY;  Surgeon: Chadwick Lozano MD;  Location:  OR    BYPASS GRAFT FEMOROPOPLITEAL Right 2/15/2022    Procedure: RIGHT SUPERFICIAL FEMORAL ARTERY GRAFT TO BELOW KNEE POPLITEAL BYPASS WITH CADAVERIC CRYOLIFE  FEMORAL-POPLITEAL ARTERY SIZE: OUTER DIAMETER: 7MM - 6MM;  Surgeon: Chadwick Lozano;  Location:  OR    BYPASS GRAFT FEMOROPOPLITEAL Right 5/26/2023    Procedure: RIGHT DISTAL SUPERFICIAL FEMORAL GRAFT TO ANTERIOR TIBIAL ARTERY WITH 26 CENTIMETER CADAVERIC SUPERFICIAL FEMORAL ARTERY GRAFT;  Surgeon: Chadwick Lozano MD;  Location:  OR    BYPASS GRAFT FEMOROPOPLITEAL Right 10/11/2023    Procedure: RIGHT FEMORAL TO POPLITEAL GRAFT THROMBECTOMY;  Surgeon: Chadwick Lozano MD;  Location:  OR    BYPASS GRAFT INSITU FEMOROPOPLITEAL Right 7/7/2021    Procedure: CREATION RIGHT FEMORAL TO POPLITEAL ARTERIAL BYPASS USING INSITU VEIN GRAFT;  Surgeon: José Luis Hernandez MD;  Location:  OR    CARDIAC CATHERIZATION  09/03/2009    multivessel CAD without target lesions, med mgmt indicated, preserved ef    CARPAL TUNNEL RELEASE RT/LT Right 05/20/2021    COLONOSCOPY N/A 12/12/2023    Procedure: Colonoscopy;  Surgeon: Corey Hoffman MD;  Location:  GI    COLONOSCOPY N/A 12/14/2023    Procedure: Colonoscopy;  Surgeon: Corey Hoffman MD;  Location:  GI    CV CORONARY ANGIOGRAM N/A 10/4/2023    Procedure: Coronary Angiogram;  Surgeon: Rogelio Ricks MD;  Location:  HEART CARDIAC CATH LAB    CV PCI N/A 10/4/2023    Procedure: Percutaneous Coronary Intervention;  Surgeon: Rogelio Ricks  MD Junior;  Location:  HEART CARDIAC CATH LAB    EMBOLECTOMY LOWER EXTREMITY Right 10/6/2023    Procedure: Right anterior tibial bypass with graft, Right tibial endarterectomy,thrombectomy, Right doraslis pedis thrombectomy, Anterior Tibial vein patch.;  Surgeon: Chadwick Lozano MD;  Location:  OR    ENDARTERECTOMY CAROTID Right 05/11/2016    Procedure: ENDARTERECTOMY CAROTID;  Surgeon: Chadwick Lozano MD;  Location:  OR    ENDARTERECTOMY CAROTID Left 06/08/2020    Procedure: LEFT CAROTID ENDARTERECTOMY with distal facal vein patch  and EEG;  Surgeon: Chadwick Lozano MD;  Location:  OR    ESOPHAGOSCOPY, GASTROSCOPY, DUODENOSCOPY (EGD), COMBINED N/A 12/12/2023    Procedure: Esophagoscopy, gastroscopy, duodenoscopy (EGD), combined;  Surgeon: Corey Hoffman MD;  Location:  GI    FINE NEEDLE ASPIRATION WITHOUT IMAGING GUIDANCE Right 9/22/2023    Procedure: Fine needle aspiration without imaging guidance;  Surgeon: Kiersten Aguilera MD;  Location:  GI    FLUORESCENCE INTRAOPERATIVE VASCULAR ANGIOGRAPHY Right 12/28/2022    Procedure: RIGHT LEG ANGIOGRAM with intervention;  Surgeon: Chadwick Lozano MD;  Location:  OR    GYN SURGERY  left tube    left salpingectomy    IR ANGIOGRAM THROUGH CATHETER (ARTERIAL)  10/6/2023    IR ANGIOGRAM THROUGH CATHETER (ARTERIAL)  10/6/2023    IR ANGIOGRAM THROUGH CATHETER (ARTERIAL)  3/31/2024    IR ANGIOGRAM THROUGH CATHETER (ARTERIAL)  3/30/2024    IR CVC TUNNEL PLACEMENT > 5 YRS OF AGE  4/3/2024    IR CVC TUNNEL REVISION RIGHT  4/15/2024    IR LOWER EXTREMITY ANGIOGRAM RIGHT  05/25/2021    IR LOWER EXTREMITY ANGIOGRAM RIGHT  10/5/2023    IR LOWER EXTREMITY ANGIOGRAM RIGHT  3/29/2024    IR OR ANGIOGRAM  6/23/2021    IR OR ANGIOGRAM  12/28/2022    LAPAROSCOPIC CHOLECYSTECTOMY N/A 09/27/2017    Procedure: LAPAROSCOPIC CHOLECYSTECTOMY;  LAPAROSCOPIC CHOLECYSTECTOMY;  Surgeon: Jacoby Tapia MD;  Location: [facility]    LAPAROSCOPY  DIAGNOSTIC (GENERAL) N/A 8/11/2021    Procedure: Exploratory laparoscopy,  laparoscopic lysis of adhesions, laparotomy;  Surgeon: Corina Ferreira MD;  Location:  OR    OR ANGIOGRAM, LOWER EXTREMITY Right 10/11/2023    Procedure: Or Angiogram, Lower Extremity;  Surgeon: Chadwick Lozano MD;  Location:  OR    ORTHOPEDIC SURGERY      left knee surgery    REPAIR ANEURYSM FEMORAL ARTERY Left 12/28/2022    Procedure: REPAIR LEFT FEMORAL PSEUDOANEURYSM;  Surgeon: Chadwick Lozano MD;  Location:  OR    VASCULAR SURGERY  aoto bi fem  left fem-AK pop    [facility] FABRIC WRAPPING OF ABDOMINAL ANEURYSM      [facility] NONSPECIFIC PROCEDURE  12/2000    angioplasty with stent right fem. a.    [facility] NONSPECIFIC PROCEDURE  1987    sinus surgery    [facility] NONSPECIFIC PROCEDURE  09/02/2009    Emergent left groin exploration with oversewing of bleeding angiographic site. 2. Endarterectomy of common femoral-proximal superficial femoral artery with greater saphenous vein patch angioplasty.   a. [place] of accessory right greater saphenous vein.     [facility] NONSPECIFIC PROCEDURE  08/27/2009    occluded left common iliac and external iliac arteries were successfully revascularized with stenting to 8 and 7 mm        SOCIAL HISTORY  Reviewed and updated in Epic.  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 - he is currently staying elsewhere  Living situation: lives with spouse, brother, and son in house with stairs to enter, all needs on main  Family support: brother and son (who patient live with) can provide assist; daughter and sister also very supportive/involved  Vocational History: not working  Tobacco use: daily smoker PTA  Social History     Socioeconomic History    Marital status:      Spouse name: Not on file    Number of children: Not on file    Years of education: Not on file    Highest education level: Not on file   Occupational History    Not on file   Tobacco Use    Smoking status: Some Days      Current packs/day: 0.25     Average packs/day: 0.3 packs/day for 56.3 years (14.1 ttl pk-yrs)     Types: Cigarettes     Start date: 1968    Smokeless tobacco: Never    Tobacco comments:     1/2 PPD   Vaping Use    Vaping status: Never Used   Substance and Sexual Activity    Alcohol use: Not Currently     Comment: x1-2 yr    Drug use: Yes     Types: Marijuana     Comment: 2x per day    Sexual activity: Yes     Partners: Male     Birth control/protection: Surgical   Other Topics Concern    Parent/sibling w/ CABG, MI or angioplasty before 65F 55M? Not Asked   Social History Narrative    Not on file     Social Determinants of Health     Financial Resource Strain: Low Risk  (1/8/2024)    Financial Resource Strain     Within the past 12 months, have you or your family members you live with been unable to get utilities (heat, electricity) when it was really needed?: No   Food Insecurity: Low Risk  (1/8/2024)    Food Insecurity     Within the past 12 months, did you worry that your food would run out before you got money to buy more?: No     Within the past 12 months, did the food you bought just not last and you didn t have money to get more?: No   Transportation Needs: Low Risk  (1/8/2024)    Transportation Needs     Within the past 12 months, has lack of transportation kept you from medical appointments, getting your medicines, non-medical meetings or appointments, work, or from getting things that you need?: No   Physical Activity: Not on file   Stress: Not on file   Social Connections: Not on file   Interpersonal Safety: Low Risk  (11/13/2023)    Interpersonal Safety     Do you feel physically and emotionally safe where you currently live?: Yes     Within the past 12 months, have you been hit, slapped, kicked or otherwise physically hurt by someone?: No     Within the past 12 months, have you been humiliated or emotionally abused in other ways by your partner or ex-partner?: No   Housing Stability: Low Risk  (1/8/2024)     Housing Stability     Do you have housing? : Yes     Are you worried about losing your housing?: No       FAMILY HISTORY  Reviewed and updated in Epic.  Family History   Problem Relation Age of Onset    Cancer Mother         bladder    Cardiovascular Father         alive,multiple heart attacks    Diabetes Maternal Grandmother     Lung Cancer Maternal Grandmother     Blood Disease Brother         clotting disorder         PRIOR FUNCTIONAL HISTORY   Pt was independent with all ADLs/IADLs, transfers, mobility and gait.    MEDICATIONS  Scheduled meds  Medications Prior to Admission   Medication Sig Dispense Refill Last Dose    acetaminophen (TYLENOL) 500 MG tablet Take 500-1,000 mg by mouth every 6 hours as needed for mild pain       augmented betamethasone dipropionate (DIPROLENE AF) 0.05 % external cream Apply topically 2 times daily as needed (skin rash) 50 g 2     Calcium Carb-Cholecalciferol (CALCIUM CARBONATE-VITAMIN D3) 600-400 MG-UNIT TABS TAKE ONE TABLET BY MOUTH TWICE DAILY 180 tablet 3     carvedilol (COREG) 6.25 MG tablet Take 1 tablet (6.25 mg) by mouth 2 times daily (with meals) 60 tablet 11     clopidogrel (PLAVIX) 75 MG tablet Take 1 tablet (75 mg) by mouth daily 90 tablet 3     fluticasone-vilanterol (BREO ELLIPTA) 200-25 MCG/ACT inhaler Inhale 1 puff into the lungs daily 120 each 11     nicotine (NICODERM CQ) 14 MG/24HR 24 hr patch Place 1 patch onto the skin daily 30 patch 1     omeprazole (PRILOSEC) 20 MG DR capsule TAKE ONE CAPSULE BY MOUTH DAILY 90 capsule 3     rosuvastatin (CRESTOR) 40 MG tablet Take 1 tablet (40 mg) by mouth At Bedtime 90 tablet 3     senna-docusate (SENOKOT-S/PERICOLACE) 8.6-50 MG tablet Take 1 tablet by mouth at bedtime       triamcinolone (KENALOG) 0.1 % external ointment Apply topically 2 times daily as needed for irritation Apply to back 60 g 3     UNKNOWN TO PATIENT Take 1 tablet by mouth daily Claritin-D (loratadine-pseudoephedrine)       UNKNOWN TO PATIENT Take 1  tablet by mouth at bedtime She could not remember name of medication but OTC med to help sleep - may be Zyrtec or Xyzal       augmented betamethasone dipropionate (DIPROLENE AF) 0.05 % external cream Apply topically 2 times daily 100 g 6     diphenhydrAMINE (BENADRYL) 25 MG tablet take Benadryl 25-50 mg one hour prior to the procedure 2 tablet 0     nitroGLYcerin (NITROSTAT) 0.4 MG sublingual tablet Place 1 tablet (0.4 mg) under the tongue See Admin Instructions for chest pain 30 tablet 11     senna-docusate (SENOKOT-S/PERICOLACE) 8.6-50 MG tablet Take 1 tablet by mouth daily as needed for constipation 30 tablet 0     [DISCONTINUED] amLODIPine (NORVASC) 2.5 MG tablet Take 1 tablet (2.5 mg) by mouth daily (Patient taking differently: Take 2.5 mg by mouth at bedtime) 30 tablet 4     [DISCONTINUED] empagliflozin (JARDIANCE) 10 MG TABS tablet Take 1 tablet (10 mg) by mouth daily 90 tablet 1     [DISCONTINUED] ferrous sulfate (FEROSUL) 325 (65 Fe) MG tablet Take 1 tablet (325 mg) by mouth every other day (Patient not taking: Reported on 3/29/2024) 60 tablet 1     [DISCONTINUED] gabapentin (NEURONTIN) 100 MG capsule Take 1 capsule (100 mg) by mouth 3 times daily 270 capsule 3     [DISCONTINUED] lisinopril (ZESTRIL) 10 MG tablet Take 1 tablet (10 mg) by mouth daily 30 tablet 4     [DISCONTINUED] polyethylene glycol (MIRALAX) 17 GM/Dose powder Take 17 g by mouth daily Hold for diarrhea or loose stools (Patient taking differently: Take 17 g by mouth daily as needed for constipation Hold for diarrhea or loose stools) 510 g 0     [DISCONTINUED] rivaroxaban ANTICOAGULANT (XARELTO) 15 MG TABS tablet Take 1 tablet (15 mg) by mouth daily (with dinner) 90 tablet 0        ALLERGIES     Allergies   Allergen Reactions    Pantoprazole      Protonix caused diffuse edema    Chantix [Varenicline]      Terrible dreams    Contrast Dye Swelling     Patient reports facial and throat swelling with prior CT contrast.    Penicillins Itching and  "Rash         REVIEW OF SYSTEMS  A 10 point ROS was performed and negative unless otherwise noted in HPI.     Constitutional: Negative for fever/chills.  Eyes: Negative for visual disturbance.  Ears, Nose, Throat: Negative for nasal congestion, sore throat.  Cardiovascular: Negative for chest pain/tightness, palpitations.  Respiratory: Negative for shortness of breath, cough.  Gastrointestinal: Positive for intermittent abdominal pain, nausea, vomiting, loose stools, decreased appetite.  Genitourinary: Positive for urinary urgency, incontinence.  Negative for dysuria.  Musculoskeletal: Negative for joint pains.  Neurologic: Positive for bilateral leg pain, intermittent lightheadedness with position changes.  Negative for headache, numbness/tingling.  Psychiatric: Positive for impaired sleep and depressed mood.      PHYSICAL EXAM  VITAL SIGNS:  LMP  (LMP Unknown)   BMI:  Estimated body mass index is 21.04 kg/m  as calculated from the following:    Height as of 3/29/24: 1.549 m (5' 1\").    Weight as of an earlier encounter on 4/22/24: 50.5 kg (111 lb 5.3 oz).     General: NAD, lying in bed  HEENT: NC/AT, MMM  Pulmonary: non-labored on room air, lungs CTA bilaterally  Cardiovascular: RRR, no murmurs appreciated, R upper chest dialysis catheter  Abdominal: soft, non-tender, non-distended, bowel sounds present  Extremities: warm, well perfused, +edema in BLE, charcot foot deformity on L, R BKA -    MSK/neuro:   Mental Status:  alert and oriented x3    Cranial Nerves: grossly normal    Sensory: Normal to light touch in bilateral upper and lower extremities   Strength: 4/5 in all muscle groups of bilateral upper.  RLE HF against gravity.  LLE HF 4/5, KE 4/5, DF 1/5, PF 3/5.  L foot ankle with limited ROM, PF contracture/charcot foot deformity.   Speech: clear/fluent   Cognition: intact to conversation, responds appropriately, follows commands   Gait: deferred  Skin: R AKA site as below with significant eschar/?necrotic " tissue on anterior thigh and medial portion of incision, dehiscence on lateral portion, no drainage, mild erythema              LABS  CBC RESULTS:   Recent Labs   Lab Test 04/20/24  1821 04/20/24  0645 04/19/24  0550 04/18/24  0604 04/17/24  0636 04/16/24  0642 04/15/24  0536   WBC  --   --   --   --  8.5 8.7 8.0   RBC  --   --   --   --  2.85* 3.13* 2.98*   HGB 8.6* 7.4* 8.3*   < > 8.5* 9.2* 8.9*   HCT  --   --   --   --  25.9* 28.3* 26.4*   MCV  --   --   --   --  91 90 89   MCH  --   --   --   --  29.8 29.4 29.9   MCHC  --   --   --   --  32.8 32.5 33.7   RDW  --   --   --   --  16.9* 16.3* 15.7*   PLT  --  170  --   --  225 227 202    < > = values in this interval not displayed.       Last Basic Metabolic Panel:  Recent Labs   Lab Test 04/22/24  0553 04/19/24  2132 04/19/24  0550 04/19/24  0201 04/18/24  2113 04/18/24  1843 04/18/24  0604 04/17/24  2200 04/17/24  0636 04/17/24  0635 04/16/24  0642 04/15/24  1146 04/15/24  0536   NA  --   --   --   --   --   --   --   --  131*  --  128*  --  123*   POTASSIUM 4.2  --  4.4  --   --   --  3.8  --  3.9  --  4.0  --  4.9   CHLORIDE  --   --   --   --   --   --   --   --  95*  --  93*  --  90*   CO2  --   --   --   --   --   --   --   --  22  --  19*  --  23   ANIONGAP  --   --   --   --   --   --   --   --  14  --  16*  --  10   GLC  --  81  --  72 92   < >  --    < > 82   < > 81   < > 84   BUN  --   --   --   --   --   --   --   --  41.2*  --  48.0*  --  81.6*   CR  --   --  2.65*  --   --   --   --   --  2.01*  --  2.55*  --  3.50*   GFRESTIMATED  --   --  19*  --   --   --   --   --  27*  --  20*  --  14*   SONIA  --   --   --   --   --   --   --   --  7.7*  --  7.7*  --  7.5*    < > = values in this interval not displayed.         IMPRESSION/PLAN:  Shirley Hendricks is a 63 year old right hand dominant female with complicated past medical history including but not limited to PAD with multiple prior bilateral lower extremity vascular bypass grafts and  thrombectomies (most recently 10/2023 right common femoral to proximal anterior tibial PTFE bypass graft), bilateral Charcot-Breonna-Tooth foot deformity, prior bilateral carotid endarterectomies, B-cell lymphoma, CAD (hx Mix3), hypertension, hyperlipidemia, GI bleed (12/2023), type 2 diabetes mellitus, COPD, tobacco use disorder, iron deficiency anemia, GERD, Celiac disease, Takotsubo cardiomyopathy, SVT, depression/anxiety and spongiotic dermatitis who was admitted on 3/29/24 with acute occlusion of right lower extremity arterial bypass graft now s/p right above-knee amputation 4/1/24, completed 4/9/24 with hospital course complicated by acute renal failure now on dialysis, sepsis, acute blood loss anemia, acute post-operative pain, nausea, loose stools, delirium, malnutrition, and multiple electrolyte derangements.  She is now admitted to ARU on 4/22/24 for multidisciplinary rehabilitation and ongoing medical management.      Admission to acute inpatient rehab 04/22/24.    Impairment group code: Amputation 05.3 Unilateral LE AKA; emergent R AKA due to acute occlusion of RLE bypass graft       PT and OT 75 minutes of each daily for 6 days per week, and SLP 30 minutes daily for 6 days per week, in addition to rehab nursing and close management of physiatrist.      Impairment of ADL's: Noted to have impaired activity tolerance, impaired balance, impaired strength, impaired weight shifting, and pain, all affecting her ability to safely and independently perform basic ADLs.  Goal for mod I with basic wheelchair-based ADLs with assist for bathing, as well as assist IADLs/heavier activities.    Impairment of mobility:  Noted to have impaired activity tolerance, impaired balance, impaired strength, impaired weight shifting, and pain, all affecting her ability to safely and independently perform basic mobility.  Goal for mod I with basic wheelchair-based mobility.    Impairment of cognition/language/swallow:  Noted to have  dysphagia with goals for safe tolerance of least restrictive diet.    Medical Conditions    S/p right AKA 4/1/24, completed 4/9/24 due to critical limb ischemia, acute occlusion of right common femoral artery to mid anterior tibial artery bypass graft   PAD/PVD with hx multiple prior vascular interventions to BLE  Bilateral Charcot-Breonna-Tooth foot deformities  - Wound care: daily dressing changes to right AKA with xeroform and gauze with kerlix to keep wound protected  - NWB RLE  - Continue PTA L foot custom orthotic   - Continue Plavix 75 mg daily (given hx left bypass).  No ongoing need for Xarelto per vascular (no recent DVT or PE)  - Continue PT/OT  - Follow up with vascular surgery in 2-4 weeks    Acute blood loss anemia on anemia of chronic disease, hx iron deficiency  Retroperitoneal hematoma  Recent GI bleed (hospitalized 12/2023)  Required intermittent transfusion during this admission (3/30, 3/31, 4/2, 4/6, 4/9, 4/13).  Repeat CT abdomen on 4/20 with stable right retroperitoneal hematoma; no s/o active bleed.  - Continue epo/venofer with HD per nephrology  - Trend CBC every T/Th/Sat  - Transfuse for Hgb <7    Acute renal failure on HD  Hyponatremia  Normal baseline Cr, though per nephrology, suspect some modest CKD.  LIMA this admission, up to peak Cr 4/22 on 4/3.  Likely BAILEY +/- rhabdo +/- ischemic injury with no signs of recovery and now dialysis dependent (started 4/3/24).  S/p tunneled dialysis catheter placement 4/3/24.  - Nephrology consulted, appreciate ongoing assistance  - HD T/Th/Sat at ARU or per nephrology recs  - Trend BMP/mag/phos on HD days  - 1200 mL fluid restriction  - Avoid NSAIDs/nephrotoxins, renally dose medications    Bilateral pleural effusion  Noted on CT abdomen 4/20 with moderate b/l pleural effusion (left>right).  Has been intermittently requiring 1-2L supplemental oxygen.  Unclear if related to volume given new renal failure on HD, also baseline COPD.  - Monitor respiratory  status, supplemental O2 by NC PRN to maintain sats >88%  - Consider thoracentesis if symptomatic or worsening hypoxia     Celiac disease  Colon distension   Loose stools, improving  Nausea/vomiting, improving  Severe malnutrition in context of acute on chronic illness  Pill cam study with [facility] in 3/2024 (due to recent GI bleed), which revealed mild non-erosive red tissue in the duodenum, some atrophic type appearance that could be celiac disease.  This was followed by celiac labs on 3/20/24 (Gliadin ab and tTG IgA antibody) which were positive and thus Celiac diagnosis was established and she was recommended for gluten-free diet.  This admission, noted to have colonic distension and circumferential wall thickening of the distal colon and rectum concerning for proctocolitis on CT abdomen.  Also with loose stools, nausea.  Despite this, patient declined gluten-free diet (switched on 4/18).  Noted to have improvement in loose stools and nausea at discharge to ARU per sending team.  - Continue renal diet for now but would recommend to consider gluten-free diet  - RD consulted, appreciate assistance  - Continue scopolamine patch for now (started 4/18), wean as able  - PRN Zofran  - Continue probiotics BID  - Monitor symptoms  - Follow up with [facility] as outpatient    CAD (hx MI x3)  HTN  Hyperlipidemia  Hx Takotsubo CM  Hx SVT  Last coronary angiogram completed 10/2023.  Recovered EF (55-60%) from ECHO 11/2023.  - Continue PTA Coreg 6.25 mg BID, amlodipine 5 mg daily  - Hold PTA lisinopril 10 mg daily due to renal failure  - PTA Jardiance 10 mg daily discontinued due to renal failure  - PTA rosuvastatin 40 mg daily resumed on ARU admission (previously held due to concern for rhabdo).  Monitor hepatic panel in 1 week    - Monitor BP    Hx bilateral carotid artery stenosis s/p bilateral carotid endarterectomies  - Follow up with vascular surgery for annual carotid duplex (last done on 1/4/24 with stable stenosis of right  carotid bulb)    Diabetes mellitus, type II  Per chart review.  A1c this admission 5.2%.  PTA Jardiance and gabapentin discontinued due to acute renal failure.  BG well-controlled during this admission without need for insulin.  - Monitor BG periodically with labs    B-cell lymphoma, stage VALENTIN  Followed by [facility] (Dr. Barrow).  Mild radiographic progression on CT 1/26/24.  Given asymptomatic, plans at that time for ongoing CT monitoring.  - Monitor for development of any B symptoms  - Trend CBC  - Follow up with oncology, repeat CT as planned in 7/2024    COPD  Tobacco use disorder  PTA smoking ~5 cigarettes per day  - Monitor respiratory status, supplemental O2 by NC PRN to maintain sats >88%  - Continue PTA Breo Ellipta  - Nicotine patch 14 mcg/day  - Ongoing education/resources for cessation    Hx MDD/anxiety  Delirium, resolved  Not on medications PTA.  Patient denies any hx depression/anxiety but does note depressed mood in setting of recent AKA and significant home stressors.  - Psychology and spiritual services consulted, appreciate assist  - Monitor mood    GERD  PTA on omeprazole 20 mg daily  - Continue pepcid 20 mg q48 hours (renally dosed) given allergy to pantoprazole (formulary sub for omeprazole)    Spongiotic dermatitis  Recently seen by [facility], recommended betamethasone cream BID PRN, not using regularly at hospital  - Consider resumption of topical steroids if recurrent symptoms  - Can order claritin or Zyrtec if itching    Sacral wound: pressure injury (stage 2 vs 3), incontinence-associated dermatitis, moisture-associated skin damage  Assessed by [facility]OCN on 4/22 at [facility] hospital.  - WOCN consulted for ongoing follow-up at ARU  - Wound care per WOCN orders  - Pressure reduction strategies    Adjustment to disability:  Clinical psychology to eval and treat if indicated  FEN: renal diet, 1200 mL fluid restriction  Bowel: incontinent, recent loose stools as above  Bladder: incontinent,  monitor PVRs at admission  DVT Prophylaxis: subcutaneous heparin  GI Prophylaxis: pepcid  Code: full, confirmed on admission  Disposition: goal for home  ELOS:  21 days  Rehab prognosis:  fair  Follow up Appointments on Discharge: PCP in 1-2 weeks, vascular surgery in 2 weeks, [facility], nephrology (ongoing HD), heme/onc ([facility], 7/2024)        Patient was discussed with Dr. Arun Pimentel, PM&R staff physician     DARREL Cantrell-C  Physical Medicine & Rehabilitation

## 2024-04-22 NOTE — DISCHARGE SUMMARY
[facility]  Discharge Summary        Shirley Hendricks MRN# [id number]   YOB: 1960 Age: 63 year old     Date of Admission:  3/29/2024  Date of Discharge:  4/22/2024  Admitting Physician:  Sofia Cristobal MD  Discharge Physician: Tanner Maurer MD  Discharging Service: Hospitalist     Primary Provider: Vasquez Benoit  Primary Care Physician Phone Number: [phone]         Discharge Diagnoses/Problem Oriented Hospital Course (Providers):    Shirley Hendricks was admitted on 3/29/2024 by Sofia Cristobal MD and I would refer you to their history and physical.  The following problems were addressed during her hospitalization:    Shirley Hendricks is a 63 year old female with PMH of  PAD/PVD, Tobacco use D/O, CAD (history of MI x3), HTN, HLP, DM2, Neuropathy, COPD, GERD, Anemia, Spongiotic dermatitis, MDD with anxiety, Chronic anticoagulation therapy with history of DVT/PE, OA, Charcot-Breonna-Tooth foot deformity, Marginal zone B-cell lymphoma, History of SBO, History of Takotsubo CM, History of SVT who presented with right leg pain and admitted on 3/29/2024 due to acute occlusion of right LE bypass graft.     She underwent emergent right above knee amputation per vascular surgery. Hospital course further complicated by likely contrast introduced acute kidney injury requiring initiation of hemodialysis.     Right common femoral artery to mid anterior tibial artery bypass graft acute occlusion  S/p Transarticular guillotine right through the knee amputation 4/1/24  S/p completion about the knee amputation right LE 4/9/24.  PAD/PVD  Hypertension  - on admission, right arterial LE US revealed the right common femoral artery to mid anterior tibial artery bypass graft occlusion  - evaluated by vascular surgery and underwent emergent procedure as noted above on 4/1/24 with completion above the knee amputation RLE on 4/9/24  - vascular surgery following  - to continue  Plavix 75 mg daily; per vascular, patient has had no recent DVT or PE and Xarelto was maintained for the graft--thus anticoagulation beside antiplatelet not indicated; discontinued xarelto (was held since admission)    - PT / OT eval -> ARU; SW following for disposition     acute Blood Loss Anemia  Retroperitoneal hematoma  Recent GI bleed (12/2023 and admitted at [facility])  - Hb mostly stable around 8-9; transfused intermittently with PRBCs (03/30 / 03/31 and 04/02 and 04/06 and 04/09 and 04/13)  - CT from 4/3 was noted with evidence of retroperitoneal hematoma at the time of her lytic therapy when they tried to access the right aortobifemoral limb (per vascular surgery)  - on 4/20, noted drift in Hb down to 7.4, repeat Hb 4/20 noted better at 8.6 without any transfusion  - repeated CT abdomen (4/20) without contrast to follow up on the bleed-- reviewed by Vascular surgery and note stable right retroperitoneal hematoma unchanged from prior; no s/o active bleed; also some thickened loops of bowel otherwise UR  - hemodynamically stable with no s/o active bleed  - monitor hemoglobin intermittently at ARU and transfuse prn for Hb <7  - nausea improved; tolerating PO well  - getting Epo and Venofer with HD per nephro     Bilateral pleural effusion  - CT abdomen 4/20 noted moderate b/l pleural effusion (left>right)   - respiratory status stable on room air; has been intermittently requiring 1-2 lts O2; no increased work of breathing or shortness of breath  - getting dialysis; can consider thoracentesis if symptomatic or oxygen needs worsening     Rhabdomyolysis   Acute renal failure, likely contrast induced nephropathy- now hemodialysis dependent  Acute Hyponatremia  severe malnutrition  - Renal US 3/31 -> No obstruction demonstrated  - Nephrology following for severe LIMA: Likely BAILEY +/- rhabdo +/- ischemic injury with: No signs of recovery and now dialysis dependent; was hemodialysed on 4/16, getting another session on  "4/17; next dialysis planned for 4/19/24; getting Epo and Venofer with HD per nephro  - Na 128---131; on presentation creatinine normal at 0.6 , peaked to 3.6---->2.01--2.65  - monitor BMP intermittently with dialysis  - Avoid NSAIDs / nephrotoxins  - nutrition following; supplements per nutrition     CAD (history of MI x3)  HTN  HLP  History of Takotsubo CM  H/o SVT  *Last coronary angiogram completed 10/2023.    - Continue PTA Coreg 6.25 mg BID, hold lisinopril 10 mg/d due to LIMA, continue Norvasc 5 mg/d.  Hold parameters in place.    - held PTA rosuvastatin 40 mg/d due to acute rhabdo; resumed on discharge   - held Jardiance 10 mg/d For now given ARF, d/shania on discharge   *Recovered EF (55-60%) from ECHO 11/2023.       Chronic anticoagulation therapy with history of DVT/PE  - discussion on anticoagulation as above ; discontinued PTA Xarelto        Colon distention  Nausea/vomiting/ loose stools  - CT abdomen 04/03 -> noted fluid-filled distention of the small large bowel can be seen in diarrheal state. No obstruction  - clinically improving with improving loose stools, nausea, tolerating PO well; no fever or pain abdomen; loose stools likely related to nutritional supplements; no clinical concern for C diff at this time  - repeat CT 4/20 noted circumferential wall thickening of the distal colon and rectum concerning for proctocolitis (see discussion on celiac disease below)  - monitor clinically  - ordered Scopolamine patch (4/18) for nausea; prn antiemetics  - discontinued IV prn antiemetics (4/19)      Celiac Disease  - patient was not happy with \"gluten free diet\" and had questions about her \"Celiac\" diagnosis  - reviewing the chart it seems on 3/19, she had a pill cam study with [facility] which revealed mild non-erosive red tissue in the duodenum, some atrophic type appearance that could be celiac disease  - this was followed by celiac labs on 3/20/24 (Gliadin ab and tTG IgA antibody) which were positive and thus " Celiac diagnosis was established and she was recommended for gluten-free diet  - however, patient with nausea and very poor PO intake and declines Gluten free diet; switched to regular dialysis diet (4/18); suggested to consider Gluten free diet once appetite improves  - repeat CT 4/20 noted circumferential wall thickening of the distal colon and rectum concerning for proctocolitis  - to follow up with [facility] as outpatient     MDD with anxiety  Acute Delirium-- resolved  Contrast dye allergy-- got solumedrol and benadryl per contrast dye allergy protocol.       Tobacco Use D/O   Patient currently smokes max 5 cigarettes per day.     - Counseled regarding smoking cessation that should also continue with PCP.    - Nicoderm patch ordered.     Type 2 DM  Diabetic neuropathy  *Noted diagnosis on chart review.  However, A1c of 5.2%.    - Hold PTA Jardiance 10 mg/d and gabapentin 100 mg TID due to acute renal failure, d/shania on discharge   - blood glucose has been stable in 80s to 90s without need for any insulin     COPD   - continue PTA inhalers.    - Encourage use of Flutter valve/IS.       GERD  - continue Pepcid bid       Spongiotic dermatitis  *Recently seen at outpatient Derm.    - continued on PTA betamethasone cream BID.       OA  - Pain management as above.       Charcot-Breonna-Tooth foot deformity  *Noted on chart review.  No interventions.      Stage VALENTIN marginal zone B-cell lymphoma  *Follows with [facility] (Dr. Barrow).    - Continue outpatient surveillance (CT scan in 8/2024).       Code Status: Full Code      Clinically Significant Risk Factors            # Hypomagnesemia: Lowest Mg = 1.6 mg/dL in last 2 days, will replace as needed   # Hypoalbuminemia: Lowest albumin = 1.6 g/dL at 4/15/2024  5:36 AM, will monitor as appropriate         # Hypertension: Noted on problem list           # Severe Malnutrition: based on nutrition assessment      # Financial/Environmental Concerns:                            Pending Results:        Unresulted Labs Ordered in the Past 30 Days of this Admission       Date and Time Order Name Status Description    4/8/2024  3:53 PM Prepare red blood cells (unit) Preliminary     4/2/2024  4:51 AM Prepare red blood cells (unit) Preliminary                  Discharge Instructions and Follow-Up:      Follow-up Appointments     Follow Up and recommended labs and tests      Follow up with [facility].  The following labs/tests are   recommended: repeat BMP and Hb in 2-3 days.    Continue dialysis three times a week.    Follow-up with vascular surgery in 2-4 weeks or as suggested.                 Discharge Disposition:      Discharged to short-term care facility         Discharge Medications:        Current Discharge Medication List        START taking these medications    Details   diphenoxylate-atropine (LOMOTIL) 2.5-0.025 MG tablet Take 1 tablet by mouth 4 times daily as needed for diarrhea    Associated Diagnoses: Diarrhea, unspecified type      HYDROmorphone (DILAUDID) 4 MG tablet Take 1 tablet (4 mg) by mouth every 6 hours as needed for moderate to severe pain  Qty: 20 tablet, Refills: 0    Associated Diagnoses: Above knee amputation of right lower extremity (H)      multivitamin RENAL (TRIPHROCAPS) 1 capsule capsule Take 1 capsule by mouth daily    Associated Diagnoses: Dialysis patient (H24)      ondansetron (ZOFRAN ODT) 4 MG ODT tab Take 1 tablet (4 mg) by mouth every 6 hours as needed for nausea or vomiting    Associated Diagnoses: Nausea           CONTINUE these medications which have CHANGED    Details   amLODIPine (NORVASC) 2.5 MG tablet Take 2 tablets (5 mg) by mouth daily    Associated Diagnoses: Coronary artery disease involving native coronary artery of native heart without angina pectoris; Essential hypertension      polyethylene glycol (MIRALAX) 17 GM/Dose powder Take 17 g by mouth daily as needed for constipation Hold for diarrhea or loose stools    Associated  Diagnoses: Constipation, unspecified constipation type           CONTINUE these medications which have NOT CHANGED    Details   acetaminophen (TYLENOL) 500 MG tablet Take 500-1,000 mg by mouth every 6 hours as needed for mild pain      !! augmented betamethasone dipropionate (DIPROLENE AF) 0.05 % external cream Apply topically 2 times daily as needed (skin rash)  Qty: 50 g, Refills: 2    Associated Diagnoses: Dermatitis      Calcium Carb-Cholecalciferol (CALCIUM CARBONATE-VITAMIN D3) 600-400 MG-UNIT TABS TAKE ONE TABLET BY MOUTH TWICE DAILY  Qty: 180 tablet, Refills: 3    Associated Diagnoses: Osteoporosis, unspecified osteoporosis type, unspecified pathological fracture presence      carvedilol (COREG) 6.25 MG tablet Take 1 tablet (6.25 mg) by mouth 2 times daily (with meals)  Qty: 60 tablet, Refills: 11    Associated Diagnoses: Takotsubo cardiomyopathy      clopidogrel (PLAVIX) 75 MG tablet Take 1 tablet (75 mg) by mouth daily  Qty: 90 tablet, Refills: 3    Associated Diagnoses: Peripheral vascular disease (H24)      fluticasone-vilanterol (BREO ELLIPTA) 200-25 MCG/ACT inhaler Inhale 1 puff into the lungs daily  Qty: 120 each, Refills: 11    Associated Diagnoses: Chronic obstructive pulmonary disease, unspecified COPD type (H)      nicotine (NICODERM CQ) 14 MG/24HR 24 hr patch Place 1 patch onto the skin daily  Qty: 30 patch, Refills: 1    Associated Diagnoses: Tobacco use disorder      omeprazole (PRILOSEC) 20 MG DR capsule TAKE ONE CAPSULE BY MOUTH DAILY  Qty: 90 capsule, Refills: 3    Associated Diagnoses: Gastroesophageal reflux disease with esophagitis without hemorrhage      rosuvastatin (CRESTOR) 40 MG tablet Take 1 tablet (40 mg) by mouth At Bedtime  Qty: 90 tablet, Refills: 3    Associated Diagnoses: Hyperlipidemia LDL goal <70      !! senna-docusate (SENOKOT-S/PERICOLACE) 8.6-50 MG tablet Take 1 tablet by mouth at bedtime      triamcinolone (KENALOG) 0.1 % external ointment Apply topically 2 times daily  as needed for irritation Apply to back  Qty: 60 g, Refills: 3    Associated Diagnoses: Dermatitis      !! UNKNOWN TO PATIENT Take 1 tablet by mouth daily Claritin-D (loratadine-pseudoephedrine)      !! UNKNOWN TO PATIENT Take 1 tablet by mouth at bedtime She could not remember name of medication but OTC med to help sleep - may be Zyrtec or Xyzal      !! augmented betamethasone dipropionate (DIPROLENE AF) 0.05 % external cream Apply topically 2 times daily  Qty: 100 g, Refills: 6    Associated Diagnoses: Spongiotic dermatitis; Dermatographia      diphenhydrAMINE (BENADRYL) 25 MG tablet take Benadryl 25-50 mg one hour prior to the procedure  Qty: 2 tablet, Refills: 0    Associated Diagnoses: Contrast media allergy      nitroGLYcerin (NITROSTAT) 0.4 MG sublingual tablet Place 1 tablet (0.4 mg) under the tongue See Admin Instructions for chest pain  Qty: 30 tablet, Refills: 11    Associated Diagnoses: Coronary artery disease involving native coronary artery of native heart without angina pectoris      !! senna-docusate (SENOKOT-S/PERICOLACE) 8.6-50 MG tablet Take 1 tablet by mouth daily as needed for constipation  Qty: 30 tablet, Refills: 0    Associated Diagnoses: Atherosclerosis of bypass graft of right lower extremity with other clinical manifestation (H24)       !! - Potential duplicate medications found. Please discuss with provider.        STOP taking these medications       empagliflozin (JARDIANCE) 10 MG TABS tablet Comments:   Reason for Stopping:         ferrous sulfate (FEROSUL) 325 (65 Fe) MG tablet Comments:   Reason for Stopping:         gabapentin (NEURONTIN) 100 MG capsule Comments:   Reason for Stopping:         lisinopril (ZESTRIL) 10 MG tablet Comments:   Reason for Stopping:         rivaroxaban ANTICOAGULANT (XARELTO) 15 MG TABS tablet Comments:   Reason for Stopping:                    Allergies:         Allergies   Allergen Reactions    Pantoprazole      Protonix caused diffuse edema    Chantix  "[Varenicline]      Terrible dreams    Contrast Dye Swelling     Patient reports facial and throat swelling with prior CT contrast.    Penicillins Itching and Rash            Consultations This Hospital Stay:      Consultation during this admission received from vascular surgery, nephrology, interventional radiology, nutrition, WOC         Condition and Physical Exam on Discharge:        Discharge condition: Stable   Discharge vitals: Blood pressure (!) 156/63, pulse 67, temperature 98.6  F (37  C), temperature source Oral, resp. rate 16, height 1.549 m (5' 1\"), weight 50.5 kg (111 lb 5.3 oz), SpO2 93%, not currently breastfeeding.     Constitutional: Alert, awake and oriented ; resting comfortably in no apparent distress     right dialysis catheter in place   Oral cavity: Moist mucosa   Cardiovascular: Normal s1 s2, regular rate and rhythm, no murmur   Lungs: B/l clear to auscultation, no wheezes or crepitations   Abdomen: Soft, nt, nd, no guarding, rigidity or rebound; BS +   LE : LLE edema ++   Musculoskeletal/Neuro Right AKA stump in dressing; noted anterior necrosis at prior graft site; dressed per vascular   Psychiatry: normal mood and affect               Discharge Orders for Skilled Facility (from Discharge Orders):        After Care Instructions       Activity - Up ad mario      Additional Discharge Instructions      When ready for outpatient dialysis unit, there is a confirmed chair time at [facility] [phone] outpatient chair time is confirmed for Tuesday/Thursday/Saturday at  0640.        Diet      Follow this diet upon discharge: Orders Placed This Encounter      Snacks/Supplements Adult: Ensure Enlive; With Meals      Fluid restriction 1200 ML FLUID      Calorie Counts      Snacks/Supplements Adult: Ensure Enlive; With Meals      Renal Diet (dialysis)        General info for SNF      Length of Stay Estimate: Short Term Care: Estimated # of Days <30  Condition at Discharge: Stable  Level of " care:skilled   Rehabilitation Potential: Good  Admission H&P remains valid and up-to-date: Yes  Recent Chemotherapy: N/A  Use Nursing Home Standing Orders: Yes        Mantoux instructions      Give two-step Mantoux (PPD) Per Facility Policy Yes        Wound care      Site:   right leg stump  Instructions:  per WOC orders                     Rehab orders for Skilled Facility (from Discharge Orders):      Referrals     Future Labs/Procedures    Medication Therapy Management Referral     Process Instructions:        This referral will be filtered to a centralized scheduling office at   [facility] and the patient will receive a call   to schedule an appointment at a [facility] location most convenient for   them.    Scheduling Instructions:    MTM referral reason  Total Score: 2           Patient had a hospital or ED visit in last 6 months and has more than 10   PTA or Discharge medications    Patient has 5 PTA or Discharge Medications AND one of the following   diagnoses: DM,HF,COPD, or AMI DX       Comments:    This service is designed to help you get the most from your medications.  A specially trained pharmacist will work closely with you and your doctors  to solve any problems related to your medications and to help you get the   best results from taking them.      The Medication Therapy Management staff will call you to schedule an   appointment.        Occupational Therapy Adult Consult     Comments:    Evaluate and treat as clinically indicated.    Reason:  deconditioning    Physical Therapy Adult Consult     Comments:    Evaluate and treat as clinically indicated.    Reason: deconditioning             Discharge Time:      Greater than 30 minutes.        Image Results From This Hospital Stay (For Non-EPIC Providers):        Results for orders placed or performed during the hospital encounter of 03/29/24   US Lower Extremity Arterial Duplex Right    Narrative    US LOWER EXTREMITY  ARTERIAL DUPLEX RIGHT  3/29/2024 3:32 PM     HISTORY:  Patient is status post right common femoral artery to mid  anterior tibial artery bypass graft. Peripheral arterial disease. Cold  foot.    COMPARISON: Ultrasound dated 1/4/2024    FINDINGS: Color Doppler and spectral waveform analysis performed. The  right common femoral artery to mid anterior tibial artery bypass graft  is occluded.    ALBERTO BENITEZ MD         SYSTEM ID:  [id number]   IR Lower Extremity Angiogram Right    [facility] RADIOLOGY  LOCATION: [facility]  DATE: 3/29/2024    PROCEDURE: RIGHT LOWER EXTREMITY ANGIOGRAM AND INITIATION OF CATHETER DIRECTED THROMBOLYTIC THERAPY    INTERVENTIONAL RADIOLOGIST: Héctor Alba MD.    INDICATION: Acute right limb ischemia with thrombosed right common femoral to below-knee bypass graft..    CONSENT: The risks, benefits and alternatives of right lower extremity angiogram with possible additional intervention were discussed with the patient  in detail. All questions were answered. Informed consent was given to proceed with the procedure.    MODERATE SEDATION: Versed 1.5 mg IV; Fentanyl 100 mcg IV.  Under physician supervision, Versed and fentanyl were administered for moderate sedation. Pulse oximetry, heart rate and blood pressure were continuously monitored by an independent trained   observer. The physician spent 75 minutes of face-to-face sedation time with the patient. During the timeout, immediately prior to administration of medications, the patient was reassessed for adequacy to receive conscious sedation.     CONTRAST: 60 mL of Isovue-300  ANTIBIOTICS: None.  ADDITIONAL MEDICATIONS: None.    FLUOROSCOPIC TIME: 15.8 minutes.  RADIATION DOSE: Air Kerma: 66 mGy.    COMPLICATIONS: No immediate complications.    STERILE BARRIER TECHNIQUE: Maximum sterile barrier technique was used. Cutaneous antisepsis was performed at the operative site with application of 2% chlorhexidine  and large sterile drape. Prior to the procedure, the  and assistant performed   hand hygiene and wore hat, mask, sterile gown, and sterile gloves during the entire procedure.    PROCEDURE:  Ultrasound was used to evaluate the bilateral groins and saved ultrasound images were obtained of patent bilateral distal segments of the aortobifemoral bypass limbs. Initial attempt at antegrade access of the right common femoral artery was   not successful given there was not enough length for wire purchase. Therefore this was abandoned.    The left groin was anesthetized with 1% lidocaine in a 1 cm incision was made in the skin. It was noted that there was significant scar tissue from previous bypass surgery within the left groin. Under ultrasound guidance a micropuncture needle was used   to access the left common femoral artery limb of the aortobifemoral bypass graft. Through the needle an 018 wire was advanced. Over the 018 wire a stiff micropuncture sheath was placed into the left common femoral bypass limb. Through the Jacoby sheath   a super stiff Amplatz wire was advanced. The Jacoby sheath was removed and serial dilatation of the severely scarred down left groin was performed initially with a 5 Kenyan, 6 Kenyan and 7 Kenyan dilator. Over the stiff Amplatz wire a 6 Kenyan sheath   was advanced into the left common femoral bypass limb. Using a combination of a Omni flush catheter and a stiff Glidewire the wire was manipulated up and over the aortobifemoral bypass graft into the right common femoral limb of the graft. Within Omni   Flush catheter in the distal segment of the right common femoral bypass limb a right lower extremity angiogram was obtained. Through the Omni Flush catheter a stiff Amplatz wire was advanced. The Omni Flush catheter was then removed. The left groin short   6 Kenyan sheath was then exchanged for a 6 Kenyan by 45 cm up and over Ansell sheath. Through the Ansell sheath using a stiff  Glidewire and a 5 Croatian KMP catheter the nipple of the occluded/thrombosed bypass graft was cannulated and the wire was   manipulated through the thrombosed bypass graft eventually into the distal anastomosis. Contrast was injected through the catheter showing that outflow tibial artery distal to the anastomosis appears to also be occluded. The Glidewire was then exchanged   for a stiff Amplatz wire. Over the stiff Amplatz wire a 50 cm x 135 cm Cragg-Tommy infusion catheter was placed spanning the entire length of the bypass graft.      FINDINGS:  Right lower extremity angiogram confirms the right common femoral to below-knee bypass graft is occluded. Angiogram below the knee just distal to the anastomosis of the graft also shows that the outflow tibial artery also appears occluded.        Impression    IMPRESSION:    Right leg angiogram confirms that the right femoral to below-knee tibial bypass graft is thrombosed. Angiogram below the knee with a catheter also shows that the tibial outflow artery distal to the anastomosis is also occluded.  Placement of a   Cragg-Tommy infusion catheter spanning the entire length of right femoral to below-knee bypass graft for catheter directed thrombolytic therapy. 0.5 mg of alteplase per hour will be infused through the catheter. 500 units of heparin per hour will be   infused through the sheath.  ____________________________________________________________________     CT Head w/o Contrast    Narrative    EXAM: CT HEAD W/O CONTRAST  LOCATION: [facility]  DATE: 3/30/2024    INDICATION: Eval htn and pounding headache  COMPARISON: MRI brain 09/18/2019  TECHNIQUE: Routine CT Head without IV contrast. Multiplanar reformats. Dose reduction techniques were used.    FINDINGS:  INTRACRANIAL CONTENTS: No intracranial hemorrhage, extraaxial collection, or mass effect.  No CT evidence of acute infarct. Stable posttraumatic encephalomalacia in the  anterior/inferior frontal lobes and left temporal tip. Normal ventricles and sulci.     VISUALIZED ORBITS/SINUSES/MASTOIDS: No intraorbital abnormality. No paranasal sinus mucosal disease. No middle ear or mastoid effusion.    BONES/SOFT TISSUES: No acute abnormality.      Impression    IMPRESSION:  1.  No acute intracranial process.  2.  Stable posttraumatic encephalomalacia in the anterior/inferior frontal lobes and left temporal tip.     Us Post Vascular Access Low Ext Duplex    Narrative    EXAM: ULTRASOUND RIGHT LOWER EXTREMITY LIMITED WITH DOPPLER  LOCATION: [facility]  DATE/TIME: 03/30/2024, 4:42 AM CDT    INDICATION: History of aortobifemoral bypass and right femoral to popliteal bypass. Status post attempted vascular access in the right inguinal region on 03/29/2024. Right groin swelling. Thrombolytic catheter in place in the left common femoral artery.  COMPARISON: 03/29/2024.    TECHNIQUE: Focused ultrasound scanning was performed by the ultrasound technologist of the right inguinal region. Spectral waveform and color Doppler evaluation were performed.    FINDINGS: Blood flow with appropriate waveforms are present within the right common and external iliac arteries and common femoral vein. A large heterogeneous mass-like structure with containing hypo and hyperechoic areas is present in the soft tissues   of the right inguinal region, measuring 10.0 x 5.3 x 5.0 cm. No blood flow is visualized in this structure.       Impression    IMPRESSION:   1.  Large 10 x 5 x 5 cm heterogeneous mass-like structure in the soft tissues the right inguinal region. This is nonspecific, but given the clinical history, likely represents a large hematoma or thrombosed pseudoaneurysm.  2.  No patent pseudoaneurysm is visualized in the right inguinal region.        IR Angiogram through catheter (arterial)    Narrative    PROCEDURE:   1. Right lower extremity angiogram.  2. Placement of a 8 x 50 mm  Viabahn covered stent into external  iliac/common femoral artery bypass graft, with post stent placement  angiography.  3. Angioplasty throughout the stent with a 9 x 40 mm balloon with post  angioplasty angiography.  4. Selective catheterization and angiography of a circumflex femoral  artery.  5. Coil embolization of a branch of the circumflex femoral artery with  post embolization angiography.  6. Angioplasty of the proximal bypass stenosis with post angioplasty  angiography.  7. Angioplasty of the distal bypass anastomosis with post angioplasty  angiography.   8. Reinitiation of catheter directed lytics.     DATE OF PROCEDURE: 3/30/2024    MODERATE SEDATION: Versed 2 mg IV and 25 mcg fentanyl IV.  Under  physician supervision, Versed and fentanyl were administered for  moderate sedation. Pulse oximetry, heart rate and blood pressure were  continuously monitored by an independent trained observer. The  physician spent 147 minutes of face-to-face sedation time with the  patient.    CONTRAST: 96 mL Visipaque IA    FLUOROSCOPY TIME: 27.7 minutes    AIR KERMA: 745.67 mGy.    CLINICAL HISTORY/INDICATION: Follow up right lower extremity angiogram  status post initiation of TPA. Critical limb ischemia. Occlusion of  the right femoral to anterior tibial artery bypass. Catheter directed  thrombolytic initiated on previous day. Large right groin hematoma in  the setting of thrombolytic, concern for active extravasation.    PROCEDURE AND FINDINGS: Following a discussion of the risks, benefits,  indications, and alternatives to treatment, appropriate informed  consent was obtained from the patient. The patient was brought to the  interventional radiology suite and placed supine on the table.  Bilateral groins were prepped and draped in a sterile fashion.  A  timeout was performed per universal protocol policy to confirm the  correct patient, site and procedure to be performed.    Preliminary fluoroscopic images were obtained  demonstrating the  indwelling infusion catheter to be unchanged in position from the  previous day. The infusion catheter was removed over a guidewire and a  digital subtraction angiogram of the right lower extremity was  performed via the vascular sheath. This demonstrated the bypass is  patent with thrombus at the level of the proximal calf and distal  bypass. No outflow is noted. At this point the long 6 Algerian vascular  sheath was exchanged over the wire for a long 7 Algerian sheath which  was advanced into the left common femoral artery.    Mechanical thrombectomy was performed with a 6 Algerian AngioJet. Post  images show no residual thrombus in the bypass. Again there is no  outflow to the tibial vessels.    Attention was then turned to the common femoral artery. Digital  subtraction angiography was performed which shows extravasation which  appears to be off the bypass. Images were obtained in multiple  projections. Over the wire an 8 x 50 mm Viabahn covered stent was  advanced across the area of active extravasation and deployed under  fluoroscopic guidance. Post stent placement angiography was performed,  images show decreased active extravasation. Angioplasty was then  performed throughout the stent with a 9 x 40 mm balloon. Post  angioplasty angiography shows persistent active extravasation.    Using a combination of catheter and wire access was gained into a  circumflex femoral artery. Digital subtraction angiography was  performed, images show active extravasation off this vessel. A  Progreat microcatheter and wire were used to selectively catheterize  the bleeding vessel. Digital subtraction angiography was performed.  Images show active extravasation. Gelfoam and coil embolization was  performed. Postembolization angiography shows no active extravasation.    Attention was then turned to the bypass. Sluggish flow is noted  throughout the bypass. There was a question if there was stenosis of  the proximal  anastomosis. Angioplasty was performed across the  proximal anastomosis with an 8 x 40 mm balloon. Post angioplasty  angiography shows persistent sluggish flow throughout the bypass.    Using a combination of catheter and wire access was gained across the  distal anastomosis. Angioplasty was performed with a 2 x 20 mm  balloon. Post images showed no significant outflow, which is  unchanged.    At this point a 50 cm infusion length infusion catheter was placed  with its proximal infusion port within the bypass in the proximal  thigh artery and its distal infusion port located in the anterior  tibial artery. Fluoroscopic spot images were stored to document  infusion port location.    At this point, the sheath and catheter were secured in place. A  sterile dressing was applied in used to cover the external portions of  the infusion catheter and sheath. TPA infusion via the infusion  catheter was then restarted at a rate of 0.25 mg/hr. A heparin  infusion was restarted through the vascular sheath at a rate of 500  units/hr.     Throughout the procedure, the patient was monitored by a radiology  nurse for cardiac rhythm and oxygen saturation which remained stable.  The patient tolerated the procedure well and left interventional  radiology in stable condition.      Impression    IMPRESSION:  1. Active extravasation in the right groin. Placement of a 8 x 50 mm  Viabahn covered stent with post dilatation with a 9 x 40 mm balloon.  There was persistent active extravasation post stent placement.  2. Coil embolization of actively bleeding right circumflex iliac  branch vessel. Postembolization shows no residual bleeding.  3. Mechanical thrombectomy throughout the right lower extremity  femoral to anterior tibial artery bypass.  4. Angioplasty of the proximal and distal anastomosis.  5. Catheter directed thrombolytic continuation with the distal ports  across the distal anastomosis.     Plan: The patient will be monitored in  the surgical ICU overnight. The  patient will have the infusion continue during this time period with  close monitoring for thrombolytic complications.  The patient will  return to IR on subsequent day for additional angiogram to evaluate  for lysis progression.     GHASSAN DO ZAY         SYSTEM ID:  [id number]   CT Lumbar Spine w/o Contrast    Narrative    EXAM: CT LUMBAR SPINE W/O CONTRAST  LOCATION: [facility]  DATE: 3/30/2024    INDICATION: Severe LE weakness  COMPARISON: None.  TECHNIQUE: Routine CT Lumbar Spine without IV contrast. Multiplanar reformats. Dose reduction techniques were used.     FINDINGS:  VERTEBRA: Normal vertebral body heights and alignment. No fracture or posttraumatic subluxation.     CANAL/FORAMINA: No canal or neural foraminal stenosis.    PARASPINAL: Atrophic left kidney. Retained contrast within the right kidney is concerning for persistent or delayed nephrogram, and can be seen in the setting of obstructive uropathy, renal vein thrombosis or renal artery stenosis. Extensive   atherosclerotic vascular disease of the aorta and vascular stents are noted. There is soft tissue stranding in the right lower quadrant which includes a hyperattenuating right lower quadrant fluid collection which is incompletely imaged and concerning   for retroperitoneal hematoma.       Impression    IMPRESSION:  1.  No fracture or posttraumatic subluxation.  2.  No high-grade spinal canal or neural foraminal stenosis.  3.  Incompletely imaged probable retroperitoneal hematoma on the right.  4.  Persistent enhancement of the right kidney concerning for delayed nephrogram which is seen in the setting of obstructive uropathy, renal vein thrombosis, or renal artery stenosis.      These findings were discussed with  at 7:18 PM CST on 03/30/2024.   IR Angiogram through catheter (arterial)    Narrative    PROCEDURE: Right lower extremity angiogram    DATE OF PROCEDURE:  3/31/2024    MODERATE SEDATION: Versed 2 mg IV.  Under physician supervision,  Versed and fentanyl were administered for moderate sedation. Pulse  oximetry, heart rate and blood pressure were continuously monitored by  an independent trained observer. The physician spent 16 minutes of  face-to-face sedation time with the patient.    CONTRAST: 30 mL Visipaque IA    FLUOROSCOPY TIME: 1.1 minutes    AIR KERMA: 39 mGy.    COMPLICATIONS: None    CLINICAL HISTORY/INDICATION: Follow up right lower extremity angiogram  status post initiation of TPA. Critical limb ischemia. Occlusion of  the right femoral to anterior tibial artery bypass. Catheter directed  thrombolytic initiated on previous day    PROCEDURE AND FINDINGS: Following a discussion of the risks, benefits,  indications, and alternatives to treatment, appropriate informed  consent was obtained from the patient. The patient was brought to the  interventional radiology suite and placed supine on the table.  Bilateral groins were prepped and draped in a sterile fashion.  A  timeout was performed per universal protocol policy to confirm the  correct patient, site and procedure to be performed.    Preliminary fluoroscopic images were obtained demonstrating the  indwelling infusion catheter to be unchanged in position from the  previous day. The infusion catheter was removed over a guidewire and a  digital subtraction angiogram of the right lower extremity was  performed via the vascular sheath. Images show sluggish flow  throughout the proximal bypass. No flow is noted throughout the mid  and distal bypass. No runoff. No active extravasation in the right  groin at the previously coiled site.    Decision was made to terminate the procedure due to no outflow and no  significant improvement in the bypass. All catheters and wires were  removed.    The groin was reprepped. The groin was closed with an 8 Cambodian  Angio-Seal closure device. Post closure device patient had a  good  femoral pulse.    Throughout the procedure, the patient was monitored by a radiology  nurse for cardiac rhythm and oxygen saturation which remained stable.  The patient tolerated the procedure well and left interventional  radiology in stable condition.      Impression    IMPRESSION: Poor flow throughout the proximal bypass, minimal to no  flow in the mid/distal bypass. Given how patient was doing clinically  a discussion was had with vascular surgery and the decision was made  to discontinue catheter directed thrombolysis.     GHASSAN COLLAZO DO         SYSTEM ID:  [id number]   US Renal Complete Non-Vascular    Narrative    EXAM: US RENAL COMPLETE NON-VASCULAR  LOCATION: [facility]  DATE: 3/31/2024    INDICATION: ARF  COMPARISON: None.  TECHNIQUE: Routine Bilateral Renal and Bladder Ultrasound.    FINDINGS:    RIGHT KIDNEY: 10.9 x 5.1 x 5.1 cm. Normal parenchyma, without hydronephrosis or masses. Trace perinephric fluid.    LEFT KIDNEY: 6.3 x 3.6 x 4.0 cm. Atrophic without hydronephrosis or masses.     BLADDER: Decompressed secondary to catheter.      Impression    IMPRESSION:  1.  No obstruction demonstrated.   XR Chest Port 1 View    Narrative    EXAM: XR CHEST PORT 1 VIEW  LOCATION: [facility]  DATE: 3/31/2024    INDICATION: RRT, hypoxia  COMPARISON: 12/10/2023      Impression    IMPRESSION: Heart is normal in size. Elevation of the right hemidiaphragm. Lungs otherwise appear clear. Right PICC line tip in the distal SVC.   XR Chest Port 1 View    Narrative    XR CHEST PORT 1 VIEW  4/2/2024 8:40 AM       INDICATION: Sob  COMPARISON: 3/31/2024       Impression    IMPRESSION: New bilateral perihilar infiltrates most likely  representing pulmonary edema. Right PICC line in good position in the  low SVC. Stable elevation of the right hemidiaphragm.    RAGINI DEVINE MD         SYSTEM ID:  [id number]   US Upper Ext Arterial Duplex Bilateral    Narrative     EXAM: US UPPER EXTREMITY ARTERIAL DUPLEX BILATERAL  LOCATION: [facility]  DATE: 4/3/2024    INDICATION: Baseline exam given bilaterally nonpalp radial pulses  COMPARISON: None.  TECHNIQUE: Arterial Duplex ultrasound of the bilateral arms. Color flow and spectral Doppler with waveform analysis performed.    FINDINGS (RIGHT upper extremity):  ARTERIAL PEAK SYSTOLIC VELOCITIES (cm/s):  Subclavian artery: 148  Axillary artery: 105  Brachial (proximal): 141  Brachial (distal): 128  Radial: 34  Ulnar: 68    WAVEFORMS/COLOR DOPPLER: Monophasic in the radial artery. Biphasic in the remainder of the arm.    FINDINGS (LEFT upper extremity):  ARTERIAL PEAK SYSTOLIC VELOCITIES (cm/s):  Subclavian artery: 191  Axillary artery: 64  Brachial (proximal): 130  Brachial (distal): 98  Radial: 107  Ulnar: 97     WAVEFORMS/COLOR DOPPLER: Biphasic and triphasic waveforms throughout the arm.      Impression    IMPRESSION:   1.  Right radial artery decreased flow velocity and monophasic waveforms. All vessels are patent.  2.  Normal left arm Doppler.   IR CVC Tunnel Placement > 5 Yrs of Age    Narrative    TUNNELED CATHETER PLACEMENT  4/3/2024 10:10 AM    INDICATION:  Chronic intravenous access.  Renal failure with  significant volume overload.    LOCATION:  Right internal jugular vein.    PROCEDURE:   Ultrasound guidance:  Ultrasound was used to localize and document  patency of the internal jugular vein.  A permanent image of the vein  was recorded.      Maximal Sterile Barrier Technique Utilized: Cap AND mask AND sterile  gown AND sterile gloves AND sterile full body drape AND hand hygiene  AND skin preparation 2% chlorhexidine for cutaneous antisepsis (or  acceptable alternative antiseptics).  Sterile Ultrasound Technique  Utilized ?Sterile gel AND sterile probe covers.    Local anesthesia was administered to the skin over the vein and a 4 mm  incision was made.  Ultrasound was used to guide placement of a  5F  dilator in the vein using standard technique.      Lidocaine was then administered in the subcutaneous tissue over the  clavicle to a point about 5 cm below the mid clavicle.  A short  incision was made at this point.  A 19 cm catheter was then brought  through the tract between the two incisions and inserted into the  jugular vein through a peel away sheath. The catheter was secured in  the subclavian region with two interrupted stitches of 2-0  polypropylene.  The neck incision was closed with Dermabond adhesive.       Complications:  None  Sedation: A moderate level of sedation was achieved with 1 mg  midazolam.                  25 mcg fentanyl.   Sedation time: 15 minutes.  Vital signs and sedation monitored by our nursing staff under my  supervision.   Fluoroscopy time:  0.7 minutes.  Air kerma: 2.4 mGy  Contrast given:  None  Local anesthetic:  20 mL of 1% lidocaine    FINDINGS:  Fluoroscopic guidance with a permanent image confirmed the  catheter tip location in the right atrium. This was confirmed with  single spot fluoroscopic image.      Impression    IMPRESSION: Successful placement of 19 cm Palindrome tunneled  hemodialysis catheter. The catheter is ready for immediate use.    NICOLAS HOPSON MD         SYSTEM ID:  [id number]   CT Abdomen Pelvis w/o Contrast    Narrative    EXAM: CT ABDOMEN PELVIS W/O CONTRAST  LOCATION: [facility]  DATE: 4/3/2024    INDICATION: abdominal pain  COMPARISON: CT chest, abdomen, pelvis from 01/26/2024. Lumbar spine CT from 03/30/2024  TECHNIQUE: CT scan of the abdomen and pelvis was performed without IV contrast. Multiplanar reformats were obtained. Dose reduction techniques were used.  CONTRAST: None.    FINDINGS: Image quality is moderately degraded by motion artifact limiting evaluation.   LOWER CHEST: Small bilateral pleural effusions. Extensive pulmonary opacities. Extensive coronary atherosclerosis. Partial visualized central line  terminating at the cavoatrial junction.    HEPATOBILIARY: Cholecystectomy.    PANCREAS: Normal.    SPLEEN: Normal.    ADRENAL GLANDS: Nodular thickening of the adrenal glands.    KIDNEYS/BLADDER: There is heterogeneous patchy retention of contrast in the right kidney indicative of renal dysfunction. The left kidney is moderately atrophic. The bladder is decompressed with Alvarez catheter. Contrast seen within the bladder. No   hydronephrosis.    BOWEL: Mild fluid-filled distention of the small and large bowel. No obstruction. There is enteric contrast in the colon. Feeding tube terminates in the proximal jejunum. The stomach is incompletely distended accentuating wall thickening. Small volume   ascites. Some of the ascites is mildly complex. In addition, there is a retroperitoneal and extraperitoneal hematoma beginning in the right lower quadrant extending along the right retroperitoneum and in the space of Retzius.    LYMPH NODES: Normal.    VASCULATURE: Extensive atherosclerosis. Aortobiiliac bypass with iliac stents, right external iliac/common femoral stent, and partial visualized right lower extremity bypass graft.     PELVIC ORGANS: Unremarkable.    MUSCULOSKELETAL: Marked diffuse anasarca. Small amount of edema and/or blood products along the bilateral inguinal fossa.      Impression    IMPRESSION:   1.  Medium-sized right retroperitoneal and extraperitoneal hematoma. Evaluation for extravasation is unable to be performed without contrast. This is likely similar to slightly smaller than the CT lumbar spine although this is incompletely visualized at   that time  2.  Extensive pulmonary opacities. Differential: Multifocal infection, ARDS, and/or pulmonary edema.  3.  Manifestations of third spacing and ascites, or anasarca, and small bilateral effusions.  4.  Fluid-filled distention of the small large bowel can be seen in diarrheal state. No obstruction.  5.  Retention of contrast in the right kidney indicative  of renal dysfunction.      Findings discussed with Dr. Law at 2119 hours.     XR Abdomen Port 1 View    Narrative    EXAM: XR ABDOMEN PORT 1 VIEW  LOCATION: [facility]  DATE: 4/3/2024    INDICATION: Verify small bowel feeding tube bedside placement  COMPARISON: None.      Impression    IMPRESSION: Feeding tube tip located in the third portion of the duodenum. Numerous gas distended loops of small bowel and colon. No free air. Clips right upper quadrant from cholecystectomy. Left and right common iliac artery stents.     XR Abdomen Port 1 View    Narrative    EXAM: XR ABDOMEN PORT 1 VIEW  LOCATION: [facility]  DATE: 4/5/2024    INDICATION: Eval for obstruction  COMPARISON: CT or 03/20/2024      Impression    IMPRESSION: Nonobstructive bowel gas pattern. Gaseous distention of the colon. Enteric feeding tube tip in the fourth portion of the duodenum. Iliac stents, cholecystectomy clips, and endoscopic clips in the right hemiabdomen.   IR CVC Tunnel Revision Right    Narrative    IR CVC TUNNEL REVISION RIGHT , DISRUPTION OF FIBRIN SHEATH UTILIZING  ANGIOPLASTY BALLOON 4/15/2024 10:44 AM     HISTORY: Malfunctioning/thrombosed tunneled central venous catheter.    COMPARISON: None.    DESCRIPTION OF PROCEDURE: After obtaining informed consent, the  patient was placed in a supine position on the fluoroscopy table. The  neck, chest, and external portions of a tunneled central venous  catheter were prepped and draped in the usual sterile manner. 1%  lidocaine was injected for local anesthesia. A stiff Glidewire was  passed through one of the lumens of the tunneled catheter and  maneuvered into the inferior vena cava. The catheter was then pulled  and removed over the wire. A new tunneled central venous catheter was  then passed over the wire, through the internal jugular vein and into  the right atrium. The tip of the catheter was positioned in the mid  right atrium. The  ports were aspirated. There was difficulty  aspirating from one of the ports. It was felt that this was likely due  to a residual fibrin sheath. Therefore, the catheter was removed over  wire. An 8 mm balloon was then passed over the wire and used to  perform angioplasty from the right atrium into the SVC to disrupt  possible fibrin sheath. Subsequently, the catheter was then replaced  over the wire and the tip was positioned in the mid to lower right  atrium. Both ports were able to be aspirated successfully this time.  The ports were then flushed with saline, and heparin locked. A  fluoroscopic image was obtained to document catheter tip position. The  catheter was sutured to the patient's skin using 2-0 Ethilon.    I determined this patient to be an appropriate candidate for the  planned sedation and procedure and reassessed the patient immediately  prior to sedation and procedure. Moderate intravenous conscious  sedation was supervised by me. The patient was independently monitored  by a registered nurse assigned to the Department of radiology using  automated blood pressure, EKG and pulse oximetry. The patient  tolerated the procedure well. There were no immediate postprocedure  complications. The patient's vital signs were monitored by radiology  nursing staff under my supervision and remained stable throughout the  study. Radiation dose for this scan was reduced using automated  exposure control, adjustment of the mA and/or kV according to patient  size, or iterative reconstruction technique.    MEDICATIONS: 1 mg Versed, 50 mg fentanyl    SEDATION TIME: 22 minutes    FLUOROSCOPY TIME: 1.2 minutes    FLUOROSCOPIC DOSE: 6.99 mGy Air Kerma    NUMBER OF FLUOROSCOPIC IMAGES: 6      Impression    IMPRESSION: Tunneled central venous catheter placed as above.  Maximal Sterile Barrier Technique Utilized: Cap AND mask AND sterile  gown AND sterile gloves AND sterile full body drape AND hand hygiene  AND skin  preparation 2% chlorhexidine for cutaneous antisepsis (or  acceptable alternative antiseptics).     Sterile Ultrasound Technique Utilized ?Sterile gel AND sterile probe  covers.      ALBERTO BENITEZ MD         SYSTEM ID:  [id number]   CT Abdomen Pelvis w/o Contrast    Narrative    EXAM: CT ABDOMEN PELVIS W/O CONTRAST  LOCATION: [facility]  DATE: 4/20/2024    INDICATION: follow up retroperitoneal hematoma  COMPARISON: CT abdomen pelvis 04/03/2024.  TECHNIQUE: CT scan of the abdomen and pelvis was performed without IV contrast. Multiplanar reformats were obtained. Dose reduction techniques were used.  CONTRAST: None.    FINDINGS:   LOWER CHEST: Moderate size bilateral pleural effusions are increased. Dense consolidation of the left lower lobe. Near complete resolution of previously seen patchy airspace opacities of the bilateral lung bases with mild residual patchy opacity in the   right middle lobe.    HEPATOBILIARY: Cholecystectomy. Dilatation of the extrahepatic bile duct appears mildly increased.    PANCREAS: Normal.    SPLEEN: Normal.    ADRENAL GLANDS: Similar thickening of the left greater than right adrenal glands.    KIDNEYS/BLADDER: Similar asymmetric atrophy of the left kidney. No hydronephrosis. Heterogeneous right renal parenchyma with small amount of retained contrast within the cortex of the right kidney which can be seen with renal dysfunction. Diffuse bladder   wall thickening.    BOWEL: Limited evaluation of bowel wall in the absence of intravenous contrast. However, there does appear to be circumferential wall thickening involving the distal descending colon and sigmoid colon and rectum. This moderate burden of stool throughout   the colon.    Moderate size right retroperitoneal hemorrhage appears unchanged. Similar presacral stranding.    LYMPH NODES: Normal.    VASCULATURE: Heavy burden of vascular calcifications. Aortobiiliac bypass iliac stents and right external  iliac/common femoral stent. Partially imaged right lower extremity bypass graft. Ectasia of the abdominal aorta measuring up to 2.7 cm in diameter,   similar.    PELVIC ORGANS: No pelvic masses.    MUSCULOSKELETAL: Anasarca. Small amount of subcutaneous gas of the left lower abdominal wall may be secondary to medication injection. Degenerative changes of the spine.      Impression    IMPRESSION:   1.  Moderate size right retroperitoneal hemorrhage appears unchanged.  2.  Circumferential wall thickening of the distal colon and rectum concerning for proctocolitis.  3.  Circumferential wall thickening of the urinary bladder with surrounding stranding concerning for cystitis. Heterogeneous appearance of the right kidney is favored secondary to retained contrast in the setting of renal dysfunction with pyelonephritis   not excluded in the correct clinical context.  4.  Moderate size bilateral pleural effusions, left greater than right, have increased.  5.  Increased dense consolidation of the left lower lobe which may represent atelectasis and/or pneumonia.  6.  Mildly increased dilatation of the extrahepatic bile ducts, nonspecific in a postcholecystectomy patient but can be correlated with laboratory values.  7.  Anasarca.         *Note: Due to a large number of results and/or encounters for the requested time period, some results have not been displayed. A complete set of results can be found in Results Review.           Most Recent Lab Results In EPIC (For Non-EPIC Providers):    Most Recent 3 CBC's:  Recent Labs   Lab Test 04/20/24  1821 04/20/24  0645 04/19/24  0550 04/18/24  0604 04/17/24  0636 04/16/24  0642 04/15/24  0536   WBC  --   --   --   --  8.5 8.7 8.0   HGB 8.6* 7.4* 8.3*   < > 8.5* 9.2* 8.9*   MCV  --   --   --   --  91 90 89   PLT  --  170  --   --  225 227 202    < > = values in this interval not displayed.      Most Recent 3 BMP's:  Recent Labs   Lab Test 04/22/24  0553 04/19/24  2132 04/19/24  0550  04/19/24  0201 04/18/24  2113 04/18/24  1843 04/18/24  0604 04/17/24  2200 04/17/24  0636 04/17/24  0635 04/16/24  0642 04/15/24  1146 04/15/24  0536   NA  --   --   --   --   --   --   --   --  131*  --  128*  --  123*   POTASSIUM 4.2  --  4.4  --   --   --  3.8  --  3.9  --  4.0  --  4.9   CHLORIDE  --   --   --   --   --   --   --   --  95*  --  93*  --  90*   CO2  --   --   --   --   --   --   --   --  22  --  19*  --  23   BUN  --   --   --   --   --   --   --   --  41.2*  --  48.0*  --  81.6*   CR  --   --  2.65*  --   --   --   --   --  2.01*  --  2.55*  --  3.50*   ANIONGAP  --   --   --   --   --   --   --   --  14  --  16*  --  10   SONIA  --   --   --   --   --   --   --   --  7.7*  --  7.7*  --  7.5*   GLC  --  81  --  72 92   < >  --    < > 82   < > 81   < > 84    < > = values in this interval not displayed.     Most Recent 3 Troponin's:  Recent Labs   Lab Test 06/10/20  1243 02/16/20  1824 09/18/19  0739   TROPI <0.015 <0.015 <0.015     Most Recent 3 INR's:  Recent Labs   Lab Test 04/11/24  0643 10/07/23  0516 10/06/23  0551   INR 1.31* 1.38* 1.08     Most Recent 2 LFT's:  Recent Labs   Lab Test 04/09/24  0636 04/04/24  0541   AST 68* 301*   ALT 53* 326*   ALKPHOS 155* 192*   BILITOTAL 0.9 0.6     Most Recent Cholesterol Panel:  Recent Labs   Lab Test 10/03/23  0941   CHOL 137   LDL 69   HDL 54   TRIG 68     Most Recent 6 Bacteria Isolates From Any Culture (See EPIC Reports for Culture Details):No lab results found.  Most Recent TSH, T4 and HgbA1c:  Recent Labs   Lab Test 04/10/24  0014 01/08/24  1542 02/04/19  0934 03/28/17  0922   TSH 2.53  --    < > 1.37   T4  --   --   --  1.19   A1C  --  5.2   < >  --     < > = values in this interval not displayed.

## 2024-04-23 ENCOUNTER — APPOINTMENT (OUTPATIENT)
Dept: PHYSICAL THERAPY | Facility: CLINIC | Age: 64
DRG: 559 | End: 2024-04-23
Attending: PHYSICAL MEDICINE & REHABILITATION
Payer: COMMERCIAL

## 2024-04-23 ENCOUNTER — APPOINTMENT (OUTPATIENT)
Dept: OCCUPATIONAL THERAPY | Facility: CLINIC | Age: 64
DRG: 559 | End: 2024-04-23
Attending: PHYSICAL MEDICINE & REHABILITATION
Payer: COMMERCIAL

## 2024-04-23 LAB
ALBUMIN SERPL BCG-MCNC: 1.9 G/DL (ref 3.5–5.2)
ALP SERPL-CCNC: 96 U/L (ref 40–150)
ALT SERPL W P-5'-P-CCNC: 20 U/L (ref 0–50)
ANION GAP SERPL CALCULATED.3IONS-SCNC: 10 MMOL/L (ref 7–15)
AST SERPL W P-5'-P-CCNC: 16 U/L (ref 0–45)
BILIRUB DIRECT SERPL-MCNC: <0.2 MG/DL (ref 0–0.3)
BILIRUB SERPL-MCNC: 0.5 MG/DL
BUN SERPL-MCNC: 52.7 MG/DL (ref 8–23)
CALCIUM SERPL-MCNC: 7.4 MG/DL (ref 8.8–10.2)
CHLORIDE SERPL-SCNC: 92 MMOL/L (ref 98–107)
CREAT SERPL-MCNC: 2.83 MG/DL (ref 0.51–0.95)
DEPRECATED HCO3 PLAS-SCNC: 22 MMOL/L (ref 22–29)
EGFRCR SERPLBLD CKD-EPI 2021: 18 ML/MIN/1.73M2
GLUCOSE SERPL-MCNC: 74 MG/DL (ref 70–99)
MAGNESIUM SERPL-MCNC: 1.5 MG/DL (ref 1.7–2.3)
MAGNESIUM SERPL-MCNC: 2.1 MG/DL (ref 1.7–2.3)
PHOSPHATE SERPL-MCNC: 4.3 MG/DL (ref 2.5–4.5)
POTASSIUM SERPL-SCNC: 4.3 MMOL/L (ref 3.4–5.3)
PROT SERPL-MCNC: 4.1 G/DL (ref 6.4–8.3)
SODIUM SERPL-SCNC: 124 MMOL/L (ref 135–145)

## 2024-04-23 PROCEDURE — 83735 ASSAY OF MAGNESIUM: CPT | Performed by: PHYSICIAN ASSISTANT

## 2024-04-23 PROCEDURE — 250N000009 HC RX 250: Performed by: PHYSICIAN ASSISTANT

## 2024-04-23 PROCEDURE — G0463 HOSPITAL OUTPT CLINIC VISIT: HCPCS | Mod: 25

## 2024-04-23 PROCEDURE — 250N000013 HC RX MED GY IP 250 OP 250 PS 637: Performed by: PHYSICIAN ASSISTANT

## 2024-04-23 PROCEDURE — 258N000003 HC RX IP 258 OP 636: Performed by: INTERNAL MEDICINE

## 2024-04-23 PROCEDURE — 97535 SELF CARE MNGMENT TRAINING: CPT | Mod: GO | Performed by: STUDENT IN AN ORGANIZED HEALTH CARE EDUCATION/TRAINING PROGRAM

## 2024-04-23 PROCEDURE — 128N000003 HC R&B REHAB

## 2024-04-23 PROCEDURE — 97165 OT EVAL LOW COMPLEX 30 MIN: CPT | Mod: GO | Performed by: STUDENT IN AN ORGANIZED HEALTH CARE EDUCATION/TRAINING PROGRAM

## 2024-04-23 PROCEDURE — 80069 RENAL FUNCTION PANEL: CPT | Performed by: PHYSICIAN ASSISTANT

## 2024-04-23 PROCEDURE — 999N000150 HC STATISTIC PT MED CONFERENCE < 30 MIN

## 2024-04-23 PROCEDURE — 90935 HEMODIALYSIS ONE EVALUATION: CPT

## 2024-04-23 PROCEDURE — 36415 COLL VENOUS BLD VENIPUNCTURE: CPT | Performed by: PHYSICIAN ASSISTANT

## 2024-04-23 PROCEDURE — 999N000040 HC STATISTIC CONSULT NO CHARGE VASC ACCESS

## 2024-04-23 PROCEDURE — 5A1D70Z PERFORMANCE OF URINARY FILTRATION, INTERMITTENT, LESS THAN 6 HOURS PER DAY: ICD-10-PCS | Performed by: INTERNAL MEDICINE

## 2024-04-23 PROCEDURE — 999N000127 HC STATISTIC PERIPHERAL IV START W US GUIDANCE

## 2024-04-23 PROCEDURE — 250N000011 HC RX IP 250 OP 636: Performed by: INTERNAL MEDICINE

## 2024-04-23 PROCEDURE — 250N000011 HC RX IP 250 OP 636: Performed by: PHYSICIAN ASSISTANT

## 2024-04-23 PROCEDURE — 99232 SBSQ HOSP IP/OBS MODERATE 35: CPT | Mod: FS | Performed by: PHYSICIAN ASSISTANT

## 2024-04-23 PROCEDURE — 84100 ASSAY OF PHOSPHORUS: CPT | Performed by: PHYSICIAN ASSISTANT

## 2024-04-23 PROCEDURE — 634N000001 HC RX 634: Mod: JZ | Performed by: INTERNAL MEDICINE

## 2024-04-23 PROCEDURE — 97162 PT EVAL MOD COMPLEX 30 MIN: CPT | Mod: GP

## 2024-04-23 PROCEDURE — 97602 WOUND(S) CARE NON-SELECTIVE: CPT

## 2024-04-23 PROCEDURE — 99223 1ST HOSP IP/OBS HIGH 75: CPT | Mod: 24 | Performed by: INTERNAL MEDICINE

## 2024-04-23 PROCEDURE — 999N000125 HC STATISTIC PATIENT MED CONFERENCE < 30 MIN: Performed by: STUDENT IN AN ORGANIZED HEALTH CARE EDUCATION/TRAINING PROGRAM

## 2024-04-23 RX ORDER — GABAPENTIN 100 MG/1
100 CAPSULE ORAL
Status: DISCONTINUED | OUTPATIENT
Start: 2024-04-23 | End: 2024-04-26

## 2024-04-23 RX ORDER — MAGNESIUM SULFATE HEPTAHYDRATE 40 MG/ML
2 INJECTION, SOLUTION INTRAVENOUS ONCE
Status: COMPLETED | OUTPATIENT
Start: 2024-04-23 | End: 2024-04-23

## 2024-04-23 RX ADMIN — NICOTINE 1 PATCH: 14 PATCH, EXTENDED RELEASE TRANSDERMAL at 08:09

## 2024-04-23 RX ADMIN — EPOETIN ALFA-EPBX 10000 UNITS: 10000 INJECTION, SOLUTION INTRAVENOUS; SUBCUTANEOUS at 11:23

## 2024-04-23 RX ADMIN — Medication 1 CAPSULE: at 08:10

## 2024-04-23 RX ADMIN — CARVEDILOL 6.25 MG: 6.25 TABLET, FILM COATED ORAL at 17:21

## 2024-04-23 RX ADMIN — GABAPENTIN 100 MG: 100 CAPSULE ORAL at 21:00

## 2024-04-23 RX ADMIN — SODIUM CHLORIDE 300 ML: 9 INJECTION, SOLUTION INTRAVENOUS at 09:21

## 2024-04-23 RX ADMIN — Medication: at 09:21

## 2024-04-23 RX ADMIN — HEPARIN SODIUM 5000 UNITS: 5000 INJECTION, SOLUTION INTRAVENOUS; SUBCUTANEOUS at 20:52

## 2024-04-23 RX ADMIN — SCOPALAMINE 1 PATCH: 1 PATCH, EXTENDED RELEASE TRANSDERMAL at 13:59

## 2024-04-23 RX ADMIN — CLOPIDOGREL BISULFATE 75 MG: 75 TABLET ORAL at 08:10

## 2024-04-23 RX ADMIN — ROSUVASTATIN CALCIUM 10 MG: 5 TABLET, COATED ORAL at 20:54

## 2024-04-23 RX ADMIN — ACETAMINOPHEN 1000 MG: 500 TABLET ORAL at 12:28

## 2024-04-23 RX ADMIN — HEPARIN SODIUM 5000 UNITS: 5000 INJECTION, SOLUTION INTRAVENOUS; SUBCUTANEOUS at 08:22

## 2024-04-23 RX ADMIN — SODIUM CHLORIDE 250 ML: 9 INJECTION, SOLUTION INTRAVENOUS at 09:21

## 2024-04-23 RX ADMIN — HEPARIN SODIUM 1600 UNITS: 1000 INJECTION INTRAVENOUS; SUBCUTANEOUS at 12:58

## 2024-04-23 RX ADMIN — MAGNESIUM SULFATE HEPTAHYDRATE 2 G: 40 INJECTION, SOLUTION INTRAVENOUS at 17:30

## 2024-04-23 RX ADMIN — Medication 1 CAPSULE: at 20:54

## 2024-04-23 ASSESSMENT — ACTIVITIES OF DAILY LIVING (ADL)
ADLS_ACUITY_SCORE: 35
ADLS_ACUITY_SCORE: 35
ADLS_ACUITY_SCORE: 37
ADLS_ACUITY_SCORE: 35
ADLS_ACUITY_SCORE: 37
ADLS_ACUITY_SCORE: 35
BADLS,_PREVIOUS_FUNCTIONAL_LEVEL: INDEPENDENT
ADLS_ACUITY_SCORE: 35
ADLS_ACUITY_SCORE: 35
IADLS,_PREVIOUS_FUNCTIONAL_LEVEL: INDEPENDENT
ADLS_ACUITY_SCORE: 37
ADLS_ACUITY_SCORE: 35
ADLS_ACUITY_SCORE: 37
ADLS_ACUITY_SCORE: 37
ADLS_ACUITY_SCORE: 35
ADLS_ACUITY_SCORE: 35
ADLS_ACUITY_SCORE: 37
ADLS_ACUITY_SCORE: 35
ADLS_ACUITY_SCORE: 35
ADLS_ACUITY_SCORE: 37
ADLS_ACUITY_SCORE: 35

## 2024-04-23 NOTE — PLAN OF CARE
FOCUS/GOAL  Bowel management, Bladder management, Pain management, Wound care management, Mobility, Skin integrity, and Safety management    ASSESSMENT, INTERVENTIONS AND CONTINUING PLAN FOR GOAL:  Patient is alert and oriented x 4. Complained of R AKA and sacral pain PRN Dilaudid and PRN tylenol given and was effective. A-2 lift transfer. R AKA wound cleaned and dressing applied. Coccyx small old pressure injury site cleaned and barrier applied. Left leg ankle and foot edema heel elevated with pillow. Patient is able to independently to roll left/right in bed. Regular/thin fair appetite FR 1200 maintained. Continent of B/B last Bm 4/22. VS WDL call light is with in reach alarm is on.   Goal Outcome Evaluation:

## 2024-04-23 NOTE — PROGRESS NOTES
CLINICAL NUTRITION SERVICES - ASSESSMENT NOTE     Nutrition Prescription    RECOMMENDATIONS FOR MDs/PROVIDERS TO ORDER:  - Recommend diet liberalization as medically able to encourage PO intake. Could consider low sodium diet given K and Phos have been WNL. Pt may be more willing to follow Gluten Free diet if diet is liberalized.   - Strongly recommend pt follow Gluten Free diet   - Appreciate encouragement surrounding PO intake    Malnutrition Status:    Unable to determine due to unable to complete NFPE    Recommendations already ordered by Registered Dietitian (RD):  Margarita Garrison Renal Support with dinner    Future/Additional Recommendations:  - Monitor PO intake, supplement use, labs, and weight trends  - Complete NFPE as able     REASON FOR ASSESSMENT  Shirley Hendricks is a/an 63 year old female assessed by the dietitian for Provider Order - assess/optimize nutrition     PMH  Past medical history including but not limited to PAD with multiple prior bilateral lower extremity vascular bypass grafts and thrombectomies (most recently 10/2023 right common femoral to proximal anterior tibial PTFE bypass graft), bilateral Charcot-Breonna-Tooth foot deformity, prior bilateral carotid endarterectomies, B-cell lymphoma, CAD (hx Mix3), hypertension, hyperlipidemia, GI bleed (12/2023), type 2 diabetes mellitus, COPD, tobacco use disorder, iron deficiency anemia, GERD, Celiac disease, Takotsubo cardiomyopathy, SVT, depression/anxiety and spongiotic dermatitis who was admitted on 3/29/24 with acute occlusion of right lower extremity arterial bypass graft now s/p right above-knee amputation 4/1/24, completed 4/9/24 with hospital course complicated by acute renal failure now on dialysis, sepsis, acute blood loss anemia, acute post-operative pain, nausea, loose stools, delirium, malnutrition, and multiple electrolyte derangements.     NUTRITION HISTORY/FINDINGS  Per chart review, pt was followed by RD during hospitalization.  "Per RD note 4/16, pt was receiving Ensure Enlive, Ensure Shake, and Rigo BID.     Calorie Count =   4/10: 713 kcal and 58 g protein (1 meals, 4 supplements)  4/11: 349 kcal and 41 g protein (2 meals, 2 supplements)  4/12: 529 kcal and 35 g protein (2 meals, 3 supplements)  4/13: 0 kcal and 0 g protein (0 meals, 0 supplements)  4/14: 783 kcal and 73 g protein (2 meals, 2 supplements)  4/15: 389 kcal and 36 g protein (1 meals, 1 supplements)     6 day average PO intake = 461 kcal (38% minimum energy needs) and 41 g protein (75% minimum protein needs)    Per H&P, \"She notes ongoing abdominal pain throughout this admission, as recently as last night, which she said was due to \"eating too much\". She also feels the food at the hospital tastes very salty to her. She feels her food choices are limited by diet. She has never done gluten-free diet and found it too restrictive in hospital. She notes ongoing nausea, which she said can be worse depending on what she eats. She also is having ongoing loose stools. Reports >20 episodes yesterday, but only 2-3 today. She denies any abdominal pain, nausea, loose stools prior to admission. Last episode of emesis was the night before last, small per her report.\"    Per MD note 4/20 from hospital admission:  \"Celiac Disease  - patient was not happy with \"gluten free diet\" and had questions about her \"Celiac\" diagnosis  - reviewing the chart it seems on 3/19, she had a pill cam study with [facility] which revealed mild non-erosive red tissue in the duodenum, some atrophic type appearance that could be celiac disease  - this was followed by celiac labs on 3/20/24 (Gliadin ab and tTG IgA antibody) which were positive and thus Celiac diagnosis was established and she was recommended for gluten-free diet  - however, patient with nausea and very poor PO intake and declines Gluten free diet; switched to regular dialysis diet (4/18); suggested to consider Gluten free diet once appetite improves  - " "repeat CT  noted circumferential wall thickening of the distal colon and rectum concerning for proctocolitis  - will have her follow up with [facility] as outpatient\"    RD unable to visit pt today as she is off the unit at dialysis. Will follow up as able. Discussed in team rounds.    CURRENT NUTRITION ORDERS  Diet: 1200 mL Fluid Restriction and Renal  - Room service with assist    Intake/Tolerance: 75% per flowsheets    LABS  Labs reviewed  Na: 124 (L)  K: 4.3 (L)  BUN: 52.7 (H)  Cr: 2.83 (H)  M.5 (L)  Phos: 4.3 (WNL)    MEDICATIONS  Medications reviewed  Pepcid, every 48 hrs  Culturell, BID  Magnesium sulfate  Renal MVI    ANTHROPOMETRICS  Height: 154.9 cm (5' .984\")  Most Recent Weight: 55.8 kg (123 lb 0.3 oz)    IBW: 43 kg - adjusted 10% for R AKA  BMI: Overweight BMI 25-29.9, 25.6 - using BMI Calculator for Amputees  Weight History:   Wt Readings from Last 10 Encounters:   24 55.8 kg (123 lb 0.3 oz)   24 50.5 kg (111 lb 5.3 oz)   24 49.8 kg (109 lb 12.8 oz)   23 50.8 kg (112 lb)   23 50.6 kg (111 lb 8 oz)   23 51.6 kg (113 lb 11.2 oz)   10/11/23 52.5 kg (115 lb 11.2 oz)   23 52.3 kg (115 lb 6.4 oz)   23 52.1 kg (114 lb 12.8 oz)   22 51.1 kg (112 lb 11.2 oz)   No weight loss noted over past year    Dosing Weight: 46 kg (adjusted BW)    ASSESSED NUTRITION NEEDS  Estimated Energy Needs: 0332-0852+ kcals/day (25 - 30+ kcals/kg)  Justification: Maintenance vs Increased needs (aim on higher end for wound healing)  Estimated Protein Needs: 60-83 grams protein/day (1.3 - 1.8 grams of pro/kg)  Justification: Dialysis and Wound healing  Estimated Fluid Needs: 1200 mL/day  Justification: On a fluid restriction    PHYSICAL FINDINGS  See malnutrition section below.  Cesar 16   WOCN consulted for sacrum pressure injury  R AKA    MALNUTRITION  % Intake: </= 50% for >/= 5 days (severe)  % Weight Loss: None noted  Subcutaneous Fat Loss: Unable to assess - pt at " dialysis  Muscle Loss: Unable to assess - pt at dialysis  Fluid Accumulation/Edema: Mild  Malnutrition Diagnosis: Unable to determine due to unable to complete NFPE    NUTRITION DIAGNOSIS  Inadequate oral intake related to abdominal pain, loose stools, nausea, and limited diet as evidenced by chart review.      INTERVENTIONS  Implementation  Nutrition Education: Unable to complete due to pt off unit at dialysis   Collaboration with other providers - discussed in team rounds  Medical food supplement therapy - Margarita Garrison Renal with dinner    Goals  Patient to consume % of nutritionally adequate meal trays TID, or the equivalent with supplements/snacks.     Monitoring/Evaluation  Progress toward goals will be monitored and evaluated per protocol.   Marleen Mccloud RD, LD  Available via phone and Vocera  Phone: [phone]  Vocera: 5R Acute Rehab Clinical Dietitian  Weekend/Holiday Vocera: Weekend Holiday Clinical Dietitian [Multi Site Groups]

## 2024-04-23 NOTE — CONSULTS
"Consult received for Vascular access care.  See LDA for details.  For additional needs place \"Nursing to Consult for Vascular Access\" [id number] order in EPIC.  "
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
## 2024-04-23 NOTE — PLAN OF CARE
Discharge Planner Post-Acute Rehab OT:     Discharge Plan: home with HH OT     Precautions: fall, NWB of RLE, dialysis, sacral wound, elevate residual limb when in bed    Current Status:  ADLs:  Mobility: WC based, liko lift Ax2  Grooming: set up seated  Dressing: UB: set up, LB: max A  Bathing: TBD  Toileting: liko lift to commode  IADLs: Pt does most IADL including caregiving for .   Vision/Cognition: intact    Assessment: Pt is below baseline post R AKA accompanied by other medical complications. Pt will benefit from skilled OT. Change in dialysis schedule impacted second session. -45 min    Other Barriers to Discharge (DME, Family Training, etc): DME, family training, pt has limited support despite living with multiple family members.

## 2024-04-23 NOTE — PHARMACY-MEDICATION REGIMEN REVIEW
Pharmacy Medication Regimen Review  Shirley Hendricks is a 63 year old female who is currently in the Acute Rehab Unit.    Assessment: Upon review of the medications and patient chart the following irregularities were found:   Medications that need other dose adjustment (including age or given adverse effects from medications):   Rosuvastatin - was on hold during hospitalization due to rhabdomyolysis, restarted upon ARU at admission at maximum dose of 10mg/day due to renal function (pharmacy will monitor and adjust as needed)   Gabapentin restart - patient had excessive sedation while on it during hospitalization so it was stopped in setting of new renal failure, guidelines vary but usual recommended starting dose is 100mg three times weekly after HD and our [place] guidelines recommend maximum of 200-300mg post-hemodialysis; would consider lowering the dose due to previous sedation issues on only 100mg TID before HD started   Other Recommendations:   PTA medication not restarted: calcium carbonate-vitamin d (pt ran out ~ 1wk before admission but per report was supposed to continue taking PTA), was not ordered during hospital admission (unclear if oversight or due to renal function, etc); corrected calcium with low albumin ~ 8.7     Plan:   Consider decreasing gabapentin dosing to 100mg three times weekly after HD in setting of previous oversedation during hospitalization and current hemodialysis.   Consider restarting calcium/vit d if indicated - may be worth reaching out to nephrology to see if they are OK with this or if they think need to restart.     Attending provider will be sent this note for review.  If there are any emergent issues noted above, pharmacist will contact provider directly by phone.      Pharmacy will periodically review the resident's medication regimen for any PRN medications not administered in > 72 hours and discontinue them. The pharmacist will discuss gradual dose reductions of  psychopharmacologic medications with interdisciplinary team on a regular basis.    Please contact pharmacy if the above does not answer specific medication questions/concerns.    Background:  A pharmacist has reviewed all medications and pertinent medical history today.  Medications were reviewed for appropriate use and any irregularities found are listed with recommendations.      Current Facility-Administered Medications:     acetaminophen (TYLENOL) tablet 500-1,000 mg, 500-1,000 mg, Oral, Q6H PRN, Corrina Hurt PA-C, 1,000 mg at 04/22/24 1742    alteplase (CATHFLO ACTIVASE) injection 2 mg, 2 mg, Intracatheter, Q1H PRN, Tamica Garcia MD    alteplase (CATHFLO ACTIVASE) injection 2 mg, 2 mg, Intracatheter, Q1H PRN, Tamica Garcia MD    amLODIPine (NORVASC) tablet 5 mg, 5 mg, Oral, Daily, Corrina Hurt PA-C    carvedilol (COREG) tablet 6.25 mg, 6.25 mg, Oral, BID w/meals, Corrina Hurt PA-C, 6.25 mg at 04/22/24 1742    clopidogrel (PLAVIX) tablet 75 mg, 75 mg, Oral, Daily, Corrina Hurt PA-C, 75 mg at 04/23/24 0810    epoetin mireya-epbx (RETACRIT) injection 10,000 Units, 10,000 Units, Intravenous, Once, Tamica Garcia MD    famotidine (PEPCID) tablet 20 mg, 20 mg, Oral, Q48H, Corrina Hurt PA-C, 20 mg at 04/22/24 1636    fluticasone-vilanterol (BREO ELLIPTA) 200-25 MCG/ACT inhaler 1 puff, 1 puff, Inhalation, Daily, Corrina Hurt PA-C    gabapentin (NEURONTIN) capsule 300 mg, 300 mg, Oral, At Bedtime, Arun Pimentel MD, 300 mg at 04/22/24 2135    sodium chloride 0.9% DIALYSIS Cath LOCK - RED Lumen, 10 mL, Intracatheter, Once in dialysis/CRRT **FOLLOWED BY** heparin 1000 unit/mL DIALYSIS Cath LOCK - RED Lumen, 1.3-2.6 mL, Intracatheter, Once in dialysis/CRRT, Tamica Garcia MD    sodium chloride 0.9% DIALYSIS Cath LOCK - BLUE Lumen, 10 mL, Intracatheter, Once in dialysis/CRRT **FOLLOWED BY** heparin 1000 unit/mL DIALYSIS Cath LOCK -BLUE Lumen, 1.3-2.6 mL,  Intracatheter, Once in dialysis/CRRT, Tamica Garcia MD    heparin ANTICOAGULANT injection 5,000 Units, 5,000 Units, Subcutaneous, Q12H, Corrina Hurt PA-C, 5,000 Units at 04/23/24 0822    HYDROmorphone (DILAUDID) tablet 2 mg, 2 mg, Oral, Q6H PRN, Corrina Hurt PA-C, 2 mg at 04/22/24 1743    lactobacillus rhamnosus (GG) (CULTURELL) capsule 1 capsule, 1 capsule, Oral, BID, Corrina Hurt PA-C, 1 capsule at 04/23/24 0810    magnesium sulfate 2 g in 50 mL sterile water intermittent infusion, 2 g, Intravenous, Once, Corrina Hurt PA-C    miconazole (MICATIN) 2 % powder, , Topical, BID, Corrina Hurt PA-C    multivitamin RENAL (TRIPHROCAPS) capsule 1 capsule, 1 capsule, Oral, Daily, Corrina Hurt PA-C, 1 capsule at 04/23/24 0810    naloxone (NARCAN) injection 0.2 mg, 0.2 mg, Intravenous, Q2 Min PRN **OR** naloxone (NARCAN) injection 0.4 mg, 0.4 mg, Intravenous, Q2 Min PRN **OR** naloxone (NARCAN) injection 0.2 mg, 0.2 mg, Intramuscular, Q2 Min PRN **OR** naloxone (NARCAN) injection 0.4 mg, 0.4 mg, Intramuscular, Q2 Min PRN, Arun Pimentel MD    nicotine (NICODERM CQ) 14 MG/24HR 24 hr patch 1 patch, 1 patch, Transdermal, Daily, Corrina Hurt PA-C, 1 patch at 04/23/24 0809    nitroGLYcerin (NITROSTAT) sublingual tablet 0.4 mg, 0.4 mg, Sublingual, See Admin Instructions, Corrina Hurt PA-C    ondansetron (ZOFRAN ODT) ODT tab 4 mg, 4 mg, Oral, Q6H PRN, Corrina Hurt PA-C    polyethylene glycol (MIRALAX) powder 17 g, 17 g, Oral, Daily PRN, Corrina Hurt PA-C    rosuvastatin (CRESTOR) tablet 10 mg, 10 mg, Oral, At Bedtime, Corrina Hurt PA-C, 10 mg at 04/22/24 2135    scopolamine (TRANSDERM) 72 hr patch 1 patch, 1 patch, Transdermal, Q72H **AND** scopolamine (TRANSDERM-SCOP) Patch in Place, , Transdermal, Q8H, Corrina Hurt PA-C    senna-docusate (SENOKOT-S/PERICOLACE) 8.6-50 MG per tablet 1-2 tablet, 1-2 tablet, Oral, BID PRN, Blu,  Corrina PAIZ PA-C    sodium chloride (PF) 0.9% PF flush 10 mL, 10 mL, Intracatheter, Q15 Min PRN, Tamica Garcia MD    sodium chloride (PF) 0.9% PF flush 10 mL, 10 mL, Intracatheter, Q15 Min PRN, Tamica Garcia MD    sodium chloride (PF) 0.9% PF flush 9 mL, 9 mL, Intracatheter, During Dialysis/CRRT (from stock), Tamica Garcia MD    sodium chloride (PF) 0.9% PF flush 9 mL, 9 mL, Intracatheter, During Dialysis/CRRT (from stock), Tamica Garcia MD    sodium chloride 0.9% BOLUS 100-150 mL, 100-150 mL, Intravenous, Q15 Min PRN, Tamica Garcia MD  No current outpatient prescriptions on file.   PMH: PAD/PVD, HTN, HLD, COPD, CAD, DMT2, h/o Takotsubo CM, h/o DVT/PE, MDD w/ anxiety, B cell lymphoma, AKA 4/1/24 due to unsalvageable limb

## 2024-04-23 NOTE — PROGRESS NOTES
Pt admitted to  ARU yesterday, 4/22. While at [facility], OP HD with Aram Moreira was set up. JUANITA emailed [facility] liaison, Lupe, who confirmed referral is open. A new Hep B Surface Antigen will be needed if pt does not start by 5/3. AVS updated, JUANITA will update [facility] once a discharge date is known.    Outpatient Dialysis:   [facility]   [address] [place] [address]   PH: [phone]   First OP HD Date: TBD   First Day Chair Time: 6:00am   Chair Schedule: T, Th, Sat   Chair Time: First shift, 6:40am   [facility] Admissions Liaison (Lupe Ramirez PH: [phone] Ext: [phone], Fax: [phone], [email or web address])    DAWN Faith  Post Acute Float   ARU/TCU/LTSATURNINO    Phone: [phone]  Fax: [phone]

## 2024-04-23 NOTE — CONSULTS
[facility] Nurse Inpatient Assessment     Consulted for: Coccyx    Patient History (according to provider note(s):      Per Corrina Hurt PA-C on 4/22/2024: Shirley Hendricks is a 63 year old right hand dominant female with complicated past medical history including but not limited to PAD with multiple prior bilateral lower extremity vascular bypass grafts and thrombectomies (most recently 10/2023 right common femoral to proximal anterior tibial PTFE bypass graft), bilateral Charcot-Breonna-Tooth foot deformity, prior bilateral carotid endarterectomies, B-cell lymphoma, CAD (hx Mix3), hypertension, hyperlipidemia, GI bleed (12/2023), type 2 diabetes mellitus, COPD, tobacco use disorder, iron deficiency anemia, GERD, Celiac disease, Takotsubo cardiomyopathy, SVT, depression/anxiety and spongiotic dermatitis who was admitted on 3/29/24 with acute occlusion of right lower extremity arterial bypass graft now s/p right above-knee amputation 4/1/24, completed 4/9/24 with hospital course complicated by acute renal failure now on dialysis, sepsis, acute blood loss anemia, acute post-operative pain, nausea, loose stools, delirium, malnutrition, and multiple electrolyte derangements.  She is now admitted to ARU on 4/22/24 for multidisciplinary rehabilitation and ongoing medical management.        Admission to acute inpatient rehab 04/22/24.      Assessment:      Areas visualized during today's visit: Coccyx, buttocks    Pressure Injury Location: Sacroccocygeal         Last photo: 4/22/24  Wound type: Pressure Injury, Incontinence Associated Dermatitis (IAD), and Moisture Associated Skin Damage (MASD)     Pressure Injury Stage: either Stage 2 or 3 deferring definitive staging until wound base has evolved, hospital acquired from recent admission at [facility].      Wound history/plan of care:  MASD related to incontinence of bladder and bowels. intergluteal crease with area of linear  "erythema, skin is intact. This line of erythema continues up gluteal crease to sacral area and evolves to a localized round area of erythema that is blanchable. To the center of this area is an open pressure related injury stage 2 vs 3, unable to determine staging at this time, area is positional over bone and within skin crevice, very little adipose tissue to this area. Base of wound with dermis to edges and yellow tissue. Patient found with brief on in bed, discussed indication for brief use, she agreed to remove due to \" I don't even want to use them but I can't get up because of my leg\". Patient has Right AKA, discussed use of bedpan and/or transferring to commode if able, she was agreeable to try this plan.     Wound base: 100 % blanchable , non-blanchable, erythema, dermis, and fibrin vs adipose ,        Palpation of the wound bed: normal      Drainage: scant     Description of drainage: serous     Measurements (length x width x depth, in cm) 1.5  x 0.5  x  0.2 cm      Periwound skin: Intact and Erythema- blanchable      Color: normal and consistent with surrounding tissue      Temperature: normal   Odor: none  Pain: denies , none  Pain intervention prior to dressing change: patient tolerated well, no significant pain present , soaking, and slow and gentle cares   Treatment goal: Heal , Infection control/prevention, Maintain (prevention of deterioration), Protection, and Promote epidermal migration  STATUS: initial assessment  Supplies ordered: at bedside, supplies stored on unit, discussed with RN, and discussed with patient     My PI Risk Assessment     Sensory Perception: 4 - No impairment     Moisture: 3 - Occasionally moist      Activity: 2 - Chairfast     Mobility: 3 - Slightly limited      Nutrition: 3 - Adequate     Friction/Shear: 1 - Problem     TOTAL: 16      Treatment Plan:     Sacral wound(s): BID and PRN for episodes of incontinence   Cleanse the area with Neno cleanse and protect, very gently " "with soft cloth.  Apply ostomy powder (#9218) on all open and denuded skin.  Apply thin layer of critic aid paste on top of it.  With repeat application, do not scrub the paste, only remove soiled paste and reapply.  If complete removal of paste is necessary use baby oil/mineral oil (#103282) and soft wash cloth.  Ensure pt has Ritchie-cushion (#907008) while sitting up in the chair.  Use only one Covidien pad in between mattress and pt.   No brief while in bed.  Add Low air loss pump to isoflex support surface  Strict PIP measures    Pressure Injury Prevention (PIP) Plan:  If patient is declining pressure injury prevention interventions: Explore reason why and address patient's concerns, Educate on pressure injury risk and prevention intervention(s), If patient is still declining, document \"informed refusal\" , and Ensure Care team is aware ( provider, charge nurse, etc)  Mattress: Follow bed algorithm, reassess daily and order specialty mattress, if indicated.  HOB: Maintain at or below 30 degrees, unless contraindicated  Repositioning in bed: Every 1-2 hours , Left/right positioning; avoid supine, Raise foot of bed prior to raising head of bed, to reduce patient sliding down (shear), and Frequent microturns using TAPS wedges, as patient tolerates  Heels: Keep elevated off mattress and Pillows under calves  Protective Dressing: Sacral Mepilex for prevention (#221451),  especially for the agitated patient   Positioning Equipment: TAPS wedges (#349406) to help maintain 30 degree side lying position   Chair positioning: Chair cushion (#324446) , Assist patient to reposition hourly, and Do NOT use a donut for sitting (this increases pressure to smaller area and creates a higher potential for injury)    If patient has a buttock pressure injury, or high risk for PI use chair cushion or SPS.  Moisture Management: Perineal cleansing /protection: Follow Incontinence Protocol, Avoid brief in bed, Clean and dry skin folds with " bathing , Consider InterDry (#512885) between folds, and Moisturize dry skin  Under Devices: Inspect skin under all medical devices during skin inspection , Ensure tubes are stabilized without tension, and Ensure patient is not lying on medical devices or equipment when repositioned  Ask provider to discontinue device when no longer needed.      Orders: Written    RECOMMEND PRIMARY TEAM ORDER: None, at this time  Education provided: importance of repositioning, plan of care, wound progress, Infection prevention , Moisture management, Hygiene, and Off-loading pressure  Discussed plan of care with: Patient and Nurse  WO nurse follow-up plan: twice weekly  Notify WOC if wound(s) deteriorate.  Nursing to notify the Provider(s) and re-consult the WO Nurse if new skin concern.    DATA:     Current support surface: Standard  Standard gel/foam mattress (IsoFlex, Atmos air, etc)  Containment of urine/stool: Incontinence Protocol, Brief, Incontinent pad in bed, and recommending NICOLE pump be added to isolflex support surface for moisture management  BMI: Body mass index is 23.26 kg/m .   Active diet order: Orders Placed This Encounter      Combination Diet Renal Diet (dialysis)     Output: I/O last 3 completed shifts:  In: 360 [P.O.:360]  Out: -      Labs:   Recent Labs   Lab 04/23/24  0602 04/20/24  1821 04/18/24  0604 04/17/24  0636   ALBUMIN 1.9*  --   --  2.3*   HGB  --  8.6*   < > 8.5*   WBC  --   --   --  8.5    < > = values in this interval not displayed.     Pressure injury risk assessment:   Sensory Perception: 3-->slightly limited  Moisture: 3-->occasionally moist  Activity: 2-->chairfast  Mobility: 3-->slightly limited  Nutrition: 3-->adequate  Friction and Shear: 2-->potential problem  Cesar Score: 16    Becky Yadav RN, CWOCN  Pager no longer is use, please contact through [employer] group: [facility] Nurse Tracy  Dept. Office Number: [phone]

## 2024-04-23 NOTE — PROGRESS NOTES
Date: 4/23/2024  Time: 1327     Data:  Pre Wt:   55.8 kg  Desired Wt: To be established  Post Wt:  53.8 kg (estimated)  Weight change: - 2 kg  Ultrafiltration - Post Run Net Total Removed (mL):  2000 ml  Vascular Access Status: CVC patent  Dialyzer Rinse:  Light  Total Blood Volume Processed: 69.3 L   Total Dialysis (Treatment) Time:   3.5 Hrs  Dialysate Bath: K 3, Ca 3  Heparin: Heparin: None     Lab:   No    Interventions:  Dialysis done through Right CVC  UF set to 2.3 Liters of fluid removal, accommodating priming and rinse back volumes  , .All med  administered per MAR  CVC dressing changed aseptically  Treatment  ended safely and  blood is rinsed back completely to a salmon color in HD lines.Catheter lumens flushed with saline and locked with Heparin,  1.6cc in each lumen., catheter caps (ClearGuard ) changed post HD.Report given to Kailash COPELAND sent back to [facility] room in stable condition.     Assessment:  A/O x 4, calm & cooperative, denies pain  Lung sounds clear anterior and lateral BUL, diminished BLL            Plan:    Per Renal team    ESTHER TapiaN, RN  Acute Dialysis RN  [facility]

## 2024-04-23 NOTE — PLAN OF CARE
Discharge Plan: home with HH PT      Precautions: fall, NWB of RLE, dialysis, sacral wound, elevate residual limb when in bed    Current Status:  Bed Mobility: not tested  Transfer: Liko Ax2 w/ nsg  Gait: unable, unsafe  Stairs: not tested  Balance: not tested    Assessment: -60 in AM d/t pt at dialysis. Attempted to re approach in PM to initiate eval, pt just back from dialysis and eating lunch. Refusing all OOB activity, but able to gain subjective info and outline expectations for rehab stay and discharge plan. Will complete eval tomorrow.     Other Barriers to Discharge (DME, Family Training, etc):   DME: K3 w/c, likely slideboard     Need to confirm family support.

## 2024-04-23 NOTE — DISCHARGE INSTRUCTIONS
PCP in 1-2 weeks, vascular surgery in 2 weeks, [facility], nephrology (ongoing HD), heme/onc ([place] [facility], 7/2024)         RE transportation to appointments, family and pt prefer OP apts to be M, T, or W. Pt will have OP HD T, TH, S at 11:15am and for 3 hours. Thanks! Danielle     [facility]ialysis:   [facility]  [address] [place] [address]   PH: [phone]   Chair Schedule: T, Th, Sat  Arrive by: 10:30am  Start time: 11:15am     [employer] Mobility:  Call [phone] to check on the status of your application   *Application scanned/emailed on 05/06/24.     Amputation Resources:   Wiggle Your Toes  [email or web address] (Facebook group as well)   Main PH: [phone]  , Ryley Frederick PH: [phone], is available by phone/text 7 days/week (recommend you contact Ryley directly)   &  Amputation Coalition   www.[email or web address]-[email or web address]   PH: [phone]  National agency where you can find support groups and find local resources.     Support Groups:   For more information, contact Josephine Lee PH: [phone], Email: [email or web address]     Balanced Bilingual Amputation Community   1st Tuesday of each month on Zoom @ 10-11:00 am central   Come talk about issues related to limb loss and limb difference without a language barrier.     Balanced Bilateral Amputation Community   2nd Monday on Zoom @ 11-12:00 pm central   Come talk about issues related multiple limb loss with those who understand.     Balanced Diabetic Amputee Community   Mondays on Zoom @ 7-8:00 pm central time   Come talk about issues related to living with a chronic disease as well as navigating limb loss.     Balanced Wound Care, Charcot Foot, & Partial Amputation Community   The 3rd Tuesday of the month on Zoom @ 10-11:00 am central   Come discuss issues related to living with chronic wound care, Charcot foot, and partial amputations.     Balanced Bilateral Above the Knee   2nd Monday of the month on Zoom @ 10-11:00 am  central   There is much to learn and know about thriving with multiple amputations-- join the learning and discussion.     Balanced Grief--A NON Therapeutic Look at Grief   1st and 3rd Tuesdays on Zoom @ 10-11:00 am central   Grief is normal learn how to thrive with limb loss-- join the learning and discussion.     Balance Amputee Community   Tuesdays on Zoom @ 7-8:00 pm central time    Come learn from various professions related to thriving with limb loss.     Balanced and Beyond-- for new amputees   (pre-surgery and beyond)   Wednesdays on Zoom @ 10-11:00 am central   Come discuss issues related to the first years with prosthetics.

## 2024-04-23 NOTE — CONSULTS
Nephrology Initial Consult  April 23, 2024      Shirley Hendricks MRN:[id number] YOB: 1960  Date of Admission:4/22/2024  Primary care provider: Vasquez Benoit  Requesting physician: Arun Pimentel MD    ASSESSMENT AND RECOMMENDATIONS:   63 year old with peripheral artery disease, coronary artery disease and history of MI, hypertension, diabetes mellitus type 2 who is admitted to ARU following hospitalization for occlusion of right LE bypass graft managed with right above-the-knee amputation and complication of acute kidney injury requiring dialysis.    #Acute kidney injury: Felt to be due to contrast nephropathy as she received contrast 3 days in a row for evaluation and management of her occluded bypass graft.  There is also potentially some component of rhabdomyolysis.  -First dialysis run 4/3/2024.  -Access tunneled CVC initially placed 4/3/2024 and revised on 4/15/2024.  -Suspicion of diabetic nephropathy and or renovascular disease.  -Dialysis today and will continue dialysis on Tuesday Thursday Saturday schedule.  Note pleural effusion on imaging and so we will challenge volume status as able  -Urine output x 1 documented yesterday we will continue to monitor for renal recovery  - has been receiving hemodialysis at [facility] and consents to ongoing hemodialysis while at [facility].    #Hyponatremia: Sodium is 124.  This is due to fluid intake in the absence of renal function.  Please fluid restrict 1 L daily    #PAD/PVD  #Right common femoral artery to the mid anterior tibial artery bypass graft acute occlusion  #Right above-the-knee amputation 4/9/2024  - Plavix 75 mg daily (previously on Xarelto but no ongoing indication)    #Anemia due to acute blood loss with retroperitoneal hematoma and history of GI bleeding in December 2023.  - Has required transfusion with PRBCs.  -Has been receiving EPO 10K and Venofer with hemodialysis  -Will order hemoglobin for next dialysis treatment.  - retacrit  10K units today    #Coronary artery disease with history of myocardial infarction x 3  #History of Takotsubo cardiomyopathy  PTA carvedilol and lisinopril, lisinopril has been on hold.  PTA rosuvastatin was initially held due to rhabdo but resumed.  PTA empagliflozin 10 mg daily has been discontinued given acute kidney injury    #Diabetes mellitus type 2  - Not requiring medications at this time    #Stage Mark marginal zone B-cell lymphoma  - Undergoing surveillance with [place] [facility]    Recommendations were communicated to primary team via note    Tamica Garcia MD   Division of Renal Disease and Hypertension  [employer]  Vocera Web Console      REASON FOR CONSULT: management of LIMA requiring dialysis    HISTORY OF PRESENT ILLNESS:  Admitting provider and nursing notes reviewed  Shirley Hendricks is a 63 year old with history of peripheral arterial disease, coronary artery disease, hypertension and diabetes who was admitted to the ARU following hospitalization at [facility] with an occluded right lower extremity bypass graft that was ultimately managed with right above-the-knee amputation.  During this hospitalization she received IV contrast several times and had acute kidney injury requiring initiation of hemodialysis on 4/3/2024.  Her last dialysis treatment was 4/19/2024.  She has had some trouble with nausea after dialysis last week.  Currently no nausea, no vomiting, no fevers but during her dialysis treatment she gets very cold.  No chest pain or shortness of breath and no lightheadedness.  She does notice that she is urinating more however she is incontinent of urine.  She also has loose stools.    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 clot    Post-menopausal     Reflux esophagitis 02/2004    EGD: esophagitis and medium HH    SBO (small bowel obstruction) (H) 08/10/2021    SVT (supraventricular tachycardia) (H24)     Thrombocytopenia (H24)     Uncomplicated asthma     Vitamin C deficiency 08/12/2018       Past Surgical History:   Procedure Laterality Date    AMPUTATE LEG ABOVE KNEE N/A 4/1/2024    Procedure: AMPUTATION, ABOVE KNEE right leg with wound vac dressing, excision of anteriotibial bypass graft, closure of (previous interventional radiology) left groin incision;  Surgeon: José Luis Hernandez MD;  Location:  OR    AMPUTATE LEG ABOVE KNEE Right 4/9/2024    Procedure: COMPLETION RIGHT ABOVE KNEE AMPUTATION;  Surgeon: José Luis Hernandez MD;  Location:  OR    ANGIOGRAM      ANGIOGRAM Right 6/23/2021    Procedure: RIGHT LOWER EXTREMITY ANGIOGRAM WITH LEFT BRACHIAL ARTERY CUTDOWN;  Surgeon: José Luis Hernandez MD;  Location:  OR    BIOPSY MASS NECK Right 10/11/2023    Procedure: Right Parotid Biopsy;  Surgeon: Randal Mendoza MD;  Location:  OR    BYPASS GRAFT FEMOROPOPLITEAL Right 09/23/2020    Procedure: RIGHT FEMORAL GRAFT TO ABOVE-KNEE POPLITEAL BYPASS USING CADAVERIC SUPERFICIAL FEMORAL ARTERY;  Surgeon: Chadwick Lozano MD;  Location: [facility] OR    BYPASS GRAFT FEMOROPOPLITEAL Right 2/15/2022    Procedure: RIGHT SUPERFICIAL FEMORAL  ARTERY GRAFT TO BELOW KNEE POPLITEAL BYPASS WITH CADAVERIC CRYOLIFE  FEMORAL-POPLITEAL ARTERY SIZE: OUTER DIAMETER: 7MM - 6MM;  Surgeon: Chadwick Lozano;  Location:  OR    BYPASS GRAFT FEMOROPOPLITEAL Right 5/26/2023    Procedure: RIGHT DISTAL SUPERFICIAL FEMORAL GRAFT TO ANTERIOR TIBIAL ARTERY WITH 26 CENTIMETER CADAVERIC SUPERFICIAL FEMORAL ARTERY GRAFT;  Surgeon: Chadwick Lozano MD;  Location:  OR    BYPASS GRAFT FEMOROPOPLITEAL Right 10/11/2023    Procedure: RIGHT FEMORAL TO POPLITEAL GRAFT THROMBECTOMY;  Surgeon: Chadwick Lozano MD;  Location:  OR    BYPASS GRAFT INSITU FEMOROPOPLITEAL Right 7/7/2021    Procedure: CREATION RIGHT FEMORAL TO POPLITEAL ARTERIAL BYPASS USING INSITU VEIN GRAFT;  Surgeon: José Luis Hernandez MD;  Location:  OR    CARDIAC CATHERIZATION  09/03/2009    multivessel CAD without target lesions, med mgmt indicated, preserved ef    CARPAL TUNNEL RELEASE RT/LT Right 05/20/2021    COLONOSCOPY N/A 12/12/2023    Procedure: Colonoscopy;  Surgeon: Corey Hoffman MD;  Location:  GI    COLONOSCOPY N/A 12/14/2023    Procedure: Colonoscopy;  Surgeon: Corey Hoffman MD;  Location:  GI    CV CORONARY ANGIOGRAM N/A 10/4/2023    Procedure: Coronary Angiogram;  Surgeon: Rogelio Ricks MD;  Location:  HEART CARDIAC CATH LAB    CV PCI N/A 10/4/2023    Procedure: Percutaneous Coronary Intervention;  Surgeon: Rogelio Ricks MD;  Location:  HEART CARDIAC CATH LAB    EMBOLECTOMY LOWER EXTREMITY Right 10/6/2023    Procedure: Right anterior tibial bypass with graft, Right tibial endarterectomy,thrombectomy, Right doraslis pedis thrombectomy, Anterior Tibial vein patch.;  Surgeon: Chadwick Lozano MD;  Location:  OR    ENDARTERECTOMY CAROTID Right 05/11/2016    Procedure: ENDARTERECTOMY CAROTID;  Surgeon: Chadwick Lozano MD;  Location:  OR    ENDARTERECTOMY CAROTID Left 06/08/2020    Procedure: LEFT CAROTID ENDARTERECTOMY  with distal facal vein patch  and EEG;  Surgeon: Chadwick Lozano MD;  Location:  OR    ESOPHAGOSCOPY, GASTROSCOPY, DUODENOSCOPY (EGD), COMBINED N/A 12/12/2023    Procedure: Esophagoscopy, gastroscopy, duodenoscopy (EGD), combined;  Surgeon: Corey Hoffman MD;  Location:  GI    FINE NEEDLE ASPIRATION WITHOUT IMAGING GUIDANCE Right 9/22/2023    Procedure: Fine needle aspiration without imaging guidance;  Surgeon: Kiersten Aguilera MD;  Location:  GI    FLUORESCENCE INTRAOPERATIVE VASCULAR ANGIOGRAPHY Right 12/28/2022    Procedure: RIGHT LEG ANGIOGRAM with intervention;  Surgeon: Chadwick Lozano MD;  Location:  OR    GYN SURGERY  left tube    left salpingectomy    IR ANGIOGRAM THROUGH CATHETER (ARTERIAL)  10/6/2023    IR ANGIOGRAM THROUGH CATHETER (ARTERIAL)  10/6/2023    IR ANGIOGRAM THROUGH CATHETER (ARTERIAL)  3/31/2024    IR ANGIOGRAM THROUGH CATHETER (ARTERIAL)  3/30/2024    IR CVC TUNNEL PLACEMENT > 5 YRS OF AGE  4/3/2024    IR CVC TUNNEL REVISION RIGHT  4/15/2024    IR LOWER EXTREMITY ANGIOGRAM RIGHT  05/25/2021    IR LOWER EXTREMITY ANGIOGRAM RIGHT  10/5/2023    IR LOWER EXTREMITY ANGIOGRAM RIGHT  3/29/2024    IR OR ANGIOGRAM  6/23/2021    IR OR ANGIOGRAM  12/28/2022    LAPAROSCOPIC CHOLECYSTECTOMY N/A 09/27/2017    Procedure: LAPAROSCOPIC CHOLECYSTECTOMY;  LAPAROSCOPIC CHOLECYSTECTOMY;  Surgeon: Jacoby Tapia MD;  Location: [facility]    LAPAROSCOPY DIAGNOSTIC (GENERAL) N/A 8/11/2021    Procedure: Exploratory laparoscopy,  laparoscopic lysis of adhesions, laparotomy;  Surgeon: Corina Ferreira MD;  Location:  OR    OR ANGIOGRAM, LOWER EXTREMITY Right 10/11/2023    Procedure: Or Angiogram, Lower Extremity;  Surgeon: Chadwick Lozano MD;  Location:  OR    ORTHOPEDIC SURGERY      left knee surgery    REPAIR ANEURYSM FEMORAL ARTERY Left 12/28/2022    Procedure: REPAIR LEFT FEMORAL PSEUDOANEURYSM;  Surgeon: Chadwick Lozano MD;  Location:  OR    VASCULAR  SURGERY  aoto bi fem  left fem-AK pop    [facility] FABRIC WRAPPING OF ABDOMINAL ANEURYSM      [facility] NONSPECIFIC PROCEDURE  12/2000    angioplasty with stent right fem. a.    [facility] NONSPECIFIC PROCEDURE  1987    sinus surgery    [facility] NONSPECIFIC PROCEDURE  09/02/2009    Emergent left groin exploration with oversewing of bleeding angiographic site. 2. Endarterectomy of common femoral-proximal superficial femoral artery with greater saphenous vein patch angioplasty.   a. Gwinner of accessory right greater saphenous vein.     [facility] NONSPECIFIC PROCEDURE  08/27/2009    occluded left common iliac and external iliac arteries were successfully revascularized with stenting to 8 and 7 mm         MEDICATIONS:  PTA Meds  Prior to Admission medications    Medication Sig Last Dose Taking? Auth Provider Long Term End Date   acetaminophen (TYLENOL) 500 MG tablet Take 500-1,000 mg by mouth every 6 hours as needed for mild pain Unknown Yes Reported, Patient No    amLODIPine (NORVASC) 2.5 MG tablet Take 2 tablets (5 mg) by mouth daily 4/22/2024 at 0842 Yes Tanner Maurer MD Yes    augmented betamethasone dipropionate (DIPROLENE AF) 0.05 % external cream Apply topically 2 times daily Unknown Yes Jacoby Fischer MD     augmented betamethasone dipropionate (DIPROLENE AF) 0.05 % external cream Apply topically 2 times daily as needed (skin rash) Unknown Yes Vasquez Benoit MD     Calcium Carb-Cholecalciferol (CALCIUM CARBONATE-VITAMIN D3) 600-400 MG-UNIT TABS TAKE ONE TABLET BY MOUTH TWICE DAILY Unknown Yes Vasquez Benoit MD     carvedilol (COREG) 6.25 MG tablet Take 1 tablet (6.25 mg) by mouth 2 times daily (with meals) 4/22/2024 at 0842 Yes Vasquez Benoit MD Yes    clopidogrel (PLAVIX) 75 MG tablet Take 1 tablet (75 mg) by mouth daily 4/22/2024 at 0842 Yes Vasquez Benoit MD Yes    diphenhydrAMINE (BENADRYL) 25 MG tablet take Benadryl 25-50 mg one hour prior to the procedure Unknown Yes Chadwick Lozano MD     diphenoxylate-atropine  (LOMOTIL) 2.5-0.025 MG tablet Take 1 tablet by mouth 4 times daily as needed for diarrhea Unknown at 2058 Yes Tanner Maurer MD     fluticasone-vilanterol (BREO ELLIPTA) 200-25 MCG/ACT inhaler Inhale 1 puff into the lungs daily 4/22/2024 at 0905 Yes Vasquez Benoit MD     HYDROmorphone (DILAUDID) 4 MG tablet Take 1 tablet (4 mg) by mouth every 6 hours as needed for moderate to severe pain 4/18/2024 at 2103 Yes Tanner Maurer MD     multivitamin RENAL (TRIPHROCAPS) 1 capsule capsule Take 1 capsule by mouth daily 4/22/2024 at 0842 Yes Tanner Maurer MD     nicotine (NICODERM CQ) 14 MG/24HR 24 hr patch Place 1 patch onto the skin daily 4/22/2024 at 0842 Yes Chadwick Lozano MD     nitroGLYcerin (NITROSTAT) 0.4 MG sublingual tablet Place 1 tablet (0.4 mg) under the tongue See Admin Instructions for chest pain Unknown Yes Vasquez Benoit MD Yes    omeprazole (PRILOSEC) 20 MG DR capsule TAKE ONE CAPSULE BY MOUTH DAILY Unknown Yes Vasquez Benoit MD     ondansetron (ZOFRAN ODT) 4 MG ODT tab Take 1 tablet (4 mg) by mouth every 6 hours as needed for nausea or vomiting 4/22/2024 at 0210 Yes Tanner Maurer MD     polyethylene glycol (MIRALAX) 17 GM/Dose powder Take 17 g by mouth daily as needed for constipation Hold for diarrhea or loose stools Unknown Yes Tanner Maurer MD     senna-docusate (SENOKOT-S/PERICOLACE) 8.6-50 MG tablet Take 1 tablet by mouth at bedtime Unknown Yes Unknown, Entered By History     triamcinolone (KENALOG) 0.1 % external ointment Apply topically 2 times daily as needed for irritation Apply to back Unknown Yes Vasquez Benoit MD     UNKNOWN TO PATIENT Take 1 tablet by mouth daily Claritin-D (loratadine-pseudoephedrine) Unknown Yes Unknown, Entered By History     UNKNOWN TO PATIENT Take 1 tablet by mouth at bedtime She could not remember name of medication but OTC med to help sleep - may be Zyrtec or Xyzal Unknown Yes Unknown, Entered By History     rosuvastatin  (CRESTOR) 40 MG tablet Take 1 tablet (40 mg) by mouth At Bedtime   Vasquez Benoit MD Yes    senna-docusate (SENOKOT-S/PERICOLACE) 8.6-50 MG tablet Take 1 tablet by mouth daily as needed for constipation   Chadwick Lozano MD        Current Meds  Current Facility-Administered Medications   Medication Dose Route Frequency Provider Last Rate Last Admin    amLODIPine (NORVASC) tablet 5 mg  5 mg Oral Daily Corrina Hurt PA-C        carvedilol (COREG) tablet 6.25 mg  6.25 mg Oral BID w/meals Corrina Hurt PA-C   6.25 mg at 04/22/24 1742    clopidogrel (PLAVIX) tablet 75 mg  75 mg Oral Daily Corrina Hurt PA-C   75 mg at 04/23/24 0810    famotidine (PEPCID) tablet 20 mg  20 mg Oral Q48H Corrina Hurt PA-C   20 mg at 04/22/24 1636    fluticasone-vilanterol (BREO ELLIPTA) 200-25 MCG/ACT inhaler 1 puff  1 puff Inhalation Daily Corrina Hurt PA-C        gabapentin (NEURONTIN) capsule 300 mg  300 mg Oral At Bedtime Arun Pimentel MD   300 mg at 04/22/24 2135    sodium chloride 0.9% DIALYSIS Cath LOCK - RED Lumen  10 mL Intracatheter Once in dialysis/CRRT Tamica Garcia MD        Followed by    heparin 1000 unit/mL DIALYSIS Cath LOCK - RED Lumen  1.3-2.6 mL Intracatheter Once in dialysis/CRRT Tamica Garcia MD        sodium chloride 0.9% DIALYSIS Cath LOCK - BLUE Lumen  10 mL Intracatheter Once in dialysis/CRRT [facility]        Followed by    heparin 1000 unit/mL DIALYSIS Cath LOCK -BLUE Lumen  1.3-2.6 mL Intracatheter Once in dialysis/CRRT Tamica Garcia MD        heparin ANTICOAGULANT injection 5,000 Units  5,000 Units Subcutaneous Q12H Corrina Hurt PA-C   5,000 Units at 04/23/24 0822    lactobacillus rhamnosus (GG) (CULTURELL) capsule 1 capsule  1 capsule Oral BID Corrina Hurt PA-C   1 capsule at 04/23/24 0810    magnesium sulfate 2 g in 50 mL sterile water intermittent infusion  2 g Intravenous Once Corrina Hurt PA-C         miconazole (MICATIN) 2 % powder   Topical BID Corrina Hurt PA-C        multivitamin RENAL (TRIPHROCAPS) capsule 1 capsule  1 capsule Oral Daily Corrina Hurt PA-C   1 capsule at 04/23/24 0810    nicotine (NICODERM CQ) 14 MG/24HR 24 hr patch 1 patch  1 patch Transdermal Daily Corrina Hurt PA-C   1 patch at 04/23/24 0809    nitroGLYcerin (NITROSTAT) sublingual tablet 0.4 mg  0.4 mg Sublingual See Admin Instructions Corrina Hurt PA-C        No heparin via hemodialysis machine   Does not apply Once Tamica Garcia MD        rosuvastatin (CRESTOR) tablet 10 mg  10 mg Oral At Bedtime Corrina Hurt PA-C   10 mg at 04/22/24 2135    scopolamine (TRANSDERM) 72 hr patch 1 patch  1 patch Transdermal Q72H Corrina Hurt PA-C        And    scopolamine (TRANSDERM-SCOP) Patch in Place   Transdermal Q8H Corrina Hurt PA-C        sodium chloride (PF) 0.9% PF flush 9 mL  9 mL Intracatheter During Dialysis/CRRT (from stock) Tamica Garcia MD        sodium chloride (PF) 0.9% PF flush 9 mL  9 mL Intracatheter During Dialysis/CRRT (from stock) Tamica Garcia MD        sodium chloride 0.9% BOLUS 250 mL  250 mL Intravenous Once in dialysis/CRRT Tamica Garcia MD        sodium chloride 0.9% BOLUS 300 mL  300 mL Hemodialysis Machine Once Tamica Garcia MD         Infusion Meds  Current Facility-Administered Medications   Medication Dose Route Frequency Provider Last Rate Last Admin       ALLERGIES:    Allergies   Allergen Reactions    Pantoprazole      Protonix caused diffuse edema    Chantix [Varenicline]      Terrible dreams    Contrast Dye Swelling     Patient reports facial and throat swelling with prior CT contrast.    Penicillins Itching and Rash       REVIEW OF SYSTEMS:  A comprehensive of systems was negative except as noted above.    SOCIAL HISTORY:   Social History     Socioeconomic History    Marital status:      Spouse name: Not on file     Number of children: Not on file    Years of education: Not on file    Highest education level: Not on file   Occupational History    Not on file   Tobacco Use    Smoking status: Some Days     Current packs/day: 0.25     Average packs/day: 0.3 packs/day for 56.3 years (14.1 ttl pk-yrs)     Types: Cigarettes     Start date: 1968    Smokeless tobacco: Never    Tobacco comments:     1/2 PPD   Vaping Use    Vaping status: Never Used   Substance and Sexual Activity    Alcohol use: Not Currently     Comment: x1-2 yr    Drug use: Yes     Types: Marijuana     Comment: 2x per day    Sexual activity: Yes     Partners: Male     Birth control/protection: Surgical   Other Topics Concern    Parent/sibling w/ CABG, MI or angioplasty before 65F 55M? Not Asked   Social History Narrative    Not on file     Social Determinants of Health     Financial Resource Strain: Low Risk  (1/8/2024)    Financial Resource Strain     Within the past 12 months, have you or your family members you live with been unable to get utilities (heat, electricity) when it was really needed?: No   Food Insecurity: Low Risk  (1/8/2024)    Food Insecurity     Within the past 12 months, did you worry that your food would run out before you got money to buy more?: No     Within the past 12 months, did the food you bought just not last and you didn t have money to get more?: No   Transportation Needs: Low Risk  (1/8/2024)    Transportation Needs     Within the past 12 months, has lack of transportation kept you from medical appointments, getting your medicines, non-medical meetings or appointments, work, or from getting things that you need?: No   Physical Activity: Not on file   Stress: Not on file   Social Connections: Not on file   Interpersonal Safety: Low Risk  (11/13/2023)    Interpersonal Safety     Do you feel physically and emotionally safe where you currently live?: Yes     Within the past 12 months, have you been hit, slapped, kicked or otherwise  "physically hurt by someone?: No     Within the past 12 months, have you been humiliated or emotionally abused in other ways by your partner or ex-partner?: No   Housing Stability: Low Risk  (2024)    Housing Stability     Do you have housing? : Yes     Are you worried about losing your housing?: No     Reviewed with patient   No one accompanies Shirley Hendricks in hospital room    FAMILY MEDICAL HISTORY:   No family history of kidney disease  Reviewed with patient     PHYSICAL EXAM:   Temp  Av.1  F (36.7  C)  Min: 94.1  F (34.5  C)  Max: 100  F (37.8  C)      Pulse  Av.2  Min: 54  Max: 122 Resp  Av.6  Min: 3  Max: 40  FiO2 (%)  Av.8 %  Min: 35 %  Max: 90 %  SpO2  Av.2 %  Min: 83 %  Max: 100 %       BP (!) 157/69 (BP Location: Left arm, Patient Position: Semi-Myers's, Cuff Size: Adult Regular)   Pulse 72   Temp 97.8  F (36.6  C) (Axillary)   Resp 16   Ht 1.549 m (5' 0.98\")   Wt 55.8 kg (123 lb 0.3 oz)   LMP  (LMP Unknown)   SpO2 92%   BMI 23.26 kg/m        Admit Weight: 59.1 kg (130 lb 4.7 oz)     GENERAL APPEARANCE: No distress, awake  EYES: No scleral icterus, pupils equal  Lymphatics: no cervical or supraclavicular LAD  Pulmonary: lungs clear to auscultation with equal breath sounds bilaterally, no clubbing  CV: regular rhythm, normal rate, no rub   - JVD none   - Edema 1+ of left leg  GI: soft, nontender, normal bowel sounds  MS: no evidence of inflammation in joints, no muscle tenderness  : No leger  SKIN: no rash, warm, dry, no cyanosis  NEURO: face symmetric, speech fluent    LABS:   I have reviewed the following labs:  CMP  Recent Labs   Lab 24  0602 24  0553 24  2132 24  0550 24  0201 24  2113 24  1843 24  0604 24  2200 24  0636   *  --   --   --   --   --   --   --   --  131*   POTASSIUM 4.3 4.2  --  4.4  --   --   --  3.8  --  3.9   CHLORIDE 92*  --   --   --   --   --   --   --   --  95*   CO2 22  " --   --   --   --   --   --   --   --  22   ANIONGAP 10  --   --   --   --   --   --   --   --  14   GLC 74  --  81  --  72 92   < >  --    < > 82   BUN 52.7*  --   --   --   --   --   --   --   --  41.2*   CR 2.83*  --   --  2.65*  --   --   --   --   --  2.01*   GFRESTIMATED 18*  --   --  19*  --   --   --   --   --  27*   SONIA 7.4*  --   --   --   --   --   --   --   --  7.7*   MAG 1.5* 1.6*  --  1.7  --   --   --  1.6*  --  1.6*   PHOS 4.3 3.8  --  3.7  --   --   --  2.9  --  3.0   PROTTOTAL 4.1*  --   --   --   --   --   --   --   --   --    ALBUMIN 1.9*  --   --   --   --   --   --   --   --  2.3*   BILITOTAL 0.5  --   --   --   --   --   --   --   --   --    ALKPHOS 96  --   --   --   --   --   --   --   --   --    AST 16  --   --   --   --   --   --   --   --   --    ALT 20  --   --   --   --   --   --   --   --   --     < > = values in this interval not displayed.     CBC  Recent Labs   Lab 04/20/24  1821 04/20/24  0645 04/19/24  0550 04/18/24  0604 04/17/24  0636   HGB 8.6* 7.4* 8.3* 8.2* 8.5*   WBC  --   --   --   --  8.5   RBC  --   --   --   --  2.85*   HCT  --   --   --   --  25.9*   MCV  --   --   --   --  91   MCH  --   --   --   --  29.8   MCHC  --   --   --   --  32.8   RDW  --   --   --   --  16.9*   PLT  --  170  --   --  225     INRNo lab results found in last 7 days.  ABGNo lab results found in last 7 days.   URINE STUDIES  Recent Labs   Lab Test 02/04/19  0935 02/01/19  1118   COLOR Yellow Light Yellow   APPEARANCE Clear Clear   URINEGLC Negative Negative   URINEBILI Negative Negative   URINEKETONE Negative 40*   SG 1.010 1.006   UBLD Negative Negative   URINEPH 6.0 5.0   PROTEIN Negative Negative   UROBILINOGEN 0.2  --    NITRITE Negative Negative   LEUKEST Negative Negative   RBCU O - 2 <1   WBCU 0 - 5 1     No lab results found.  PTH  No lab results found.  IRON STUDIES  Recent Labs   Lab Test 04/10/24  0559 04/09/24  1817 01/08/24  1542 12/11/23  0550 11/13/23  1705 10/06/23  0551  05/06/16  1234 03/03/16  1157   IRON 22*  --  71 122 18* 90 63 42   *  --  429 376 363 329 383 392   IRONSAT 18  --  17 32 5* 27 16 11*   BRYN  --  609* 25 23  --  37 36 18       IMAGING:  All imaging studies reviewed by me.     Tamica Garcia MD
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
## 2024-04-23 NOTE — PLAN OF CARE
Goal Outcome Evaluation: Alert and oriented x4. Able to make needs known. Right AKA.  A2 lift with transfers. On a 3g sodium diet, thin liquids and able to take her pills whole. On a 1 L fluid restrictions. Incontinent of bowel and bladder. LBM 4/23. Pt had dialysis today and 2L of fluids out. IV magnesium  to be replaced today per order once there is an access line. Message passed to the incoming nurse. Right chest dialysis port in place. No pain at the moment. Continue with POC.
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
## 2024-04-23 NOTE — PROGRESS NOTES
[facility]   Acute Rehabilitation Unit  Daily progress note    INTERVAL HISTORY  Shirley Hendricks was seen at bedside this afternoon following team rounds.  No acute events reported overnight.  She reports that she slept all the way through the night quite soundly.  She notes a headache earlier this morning while in dialysis.  Overall feels her leg pain has been manageable today.  Was feeling very chilled while in dialysis and notes this distracted her from all her other concerns.  She notes some lightheadedness when first up in the morning, but reports having this even prior to admission.  She just had a large volume (urine) void.  No abdominal pain, nausea/vomiting today.  Tolerated breakfast without issues and is just about to have lunch.  She has had loose stools x2 so far today.  Discussed need for magnesium replacement.  She really hoped to avoid having IV placed but ultimately chooses this strategy after discussing pros and cons.  She is still upset about PICC being removed prior to ARU admission.  Denies other questions or concerns today.    Functionally, therapy evals underway today.  For full functional updates, see team rounds note from today.    MEDICATIONS  Current Facility-Administered Medications   Medication Dose Route Frequency Provider Last Rate Last Admin    amLODIPine (NORVASC) tablet 5 mg  5 mg Oral Daily Corrina Hurt PA-C        carvedilol (COREG) tablet 6.25 mg  6.25 mg Oral BID w/meals Corrina Hurt PA-C   6.25 mg at 04/22/24 1742    clopidogrel (PLAVIX) tablet 75 mg  75 mg Oral Daily Corrina Hurt PA-C        famotidine (PEPCID) tablet 20 mg  20 mg Oral Q48H Corrina Hurt PA-C   20 mg at 04/22/24 1636    fluticasone-vilanterol (BREO ELLIPTA) 200-25 MCG/ACT inhaler 1 puff  1 puff Inhalation Daily Corrina Hurt PA-C        gabapentin (NEURONTIN) capsule 300 mg  300 mg Oral At Bedtime Arun Pimentel MD   300 mg at  04/22/24 2135    sodium chloride 0.9% DIALYSIS Cath LOCK - RED Lumen  10 mL Intracatheter Once in dialysis/CRRT Tamica Garcia MD        Followed by    heparin 1000 unit/mL DIALYSIS Cath LOCK - RED Lumen  1.3-2.6 mL Intracatheter Once in dialysis/CRRT Tamica Garcia MD        sodium chloride 0.9% DIALYSIS Cath LOCK - BLUE Lumen  10 mL Intracatheter Once in dialysis/CRRT Tamica Garcia MD        Followed by    heparin 1000 unit/mL DIALYSIS Cath LOCK -BLUE Lumen  1.3-2.6 mL Intracatheter Once in dialysis/CRRT Tamica Garcia MD        heparin ANTICOAGULANT injection 5,000 Units  5,000 Units Subcutaneous Q12H Corrina Hurt PA-C   5,000 Units at 04/22/24 2133    lactobacillus rhamnosus (GG) (CULTURELL) capsule 1 capsule  1 capsule Oral BID Corrina Hurt PA-C   1 capsule at 04/22/24 2135    miconazole (MICATIN) 2 % powder   Topical BID Corrina Hurt PA-C        multivitamin RENAL (TRIPHROCAPS) capsule 1 capsule  1 capsule Oral Daily Corrina Hurt PA-C        nicotine (NICODERM CQ) 14 MG/24HR 24 hr patch 1 patch  1 patch Transdermal Daily Corrina Hurt PA-C        nitroGLYcerin (NITROSTAT) sublingual tablet 0.4 mg  0.4 mg Sublingual See Admin Instructions Corrina Hurt PA-C        No heparin via hemodialysis machine   Does not apply Once Tamica Garcia MD        rosuvastatin (CRESTOR) tablet 10 mg  10 mg Oral At Bedtime Corrina Hurt PA-C   10 mg at 04/22/24 2135    scopolamine (TRANSDERM) 72 hr patch 1 patch  1 patch Transdermal Q72H Corrina Hurt PA-C        And    scopolamine (TRANSDERM-SCOP) Patch in Place   Transdermal Q8H Corrina Hurt PA-C        sodium chloride (PF) 0.9% PF flush 9 mL  9 mL Intracatheter During Dialysis/CRRT (from stock) Tamica Garcia MD        sodium chloride (PF) 0.9% PF flush 9 mL  9 mL Intracatheter During Dialysis/CRRT (from stock) Tamica Garcia MD        sodium chloride 0.9% BOLUS 250 mL  " 250 mL Intravenous Once in dialysis/CRRT Tamica Garcia MD        sodium chloride 0.9% BOLUS 300 mL  300 mL Hemodialysis Machine Once Tamica Garcia MD              Current Facility-Administered Medications   Medication Dose Route Frequency Provider Last Rate Last Admin    acetaminophen (TYLENOL) tablet 500-1,000 mg  500-1,000 mg Oral Q6H PRN Corrina Hurt PA-C   1,000 mg at 04/22/24 1742    alteplase (CATHFLO ACTIVASE) injection 2 mg  2 mg Intracatheter Q1H PRN Tamica Garcia MD        alteplase (CATHFLO ACTIVASE) injection 2 mg  2 mg Intracatheter Q1H PRN Tamica Garcia MD        HYDROmorphone (DILAUDID) tablet 2 mg  2 mg Oral Q6H PRN Corrina Hurt PA-C   2 mg at 04/22/24 1743    naloxone (NARCAN) injection 0.2 mg  0.2 mg Intravenous Q2 Min PRN Arun Pimentel MD        Or    naloxone (NARCAN) injection 0.4 mg  0.4 mg Intravenous Q2 Min PRN Arun Pimentel MD        Or    naloxone (NARCAN) injection 0.2 mg  0.2 mg Intramuscular Q2 Min PRN Arun Pimentel MD        Or    naloxone (NARCAN) injection 0.4 mg  0.4 mg Intramuscular Q2 Min PRN Arun Pimentel MD        ondansetron (ZOFRAN ODT) ODT tab 4 mg  4 mg Oral Q6H PRN Corrina Hurt PA-C        polyethylene glycol (MIRALAX) powder 17 g  17 g Oral Daily PRN Corrina Hurt PA-C        senna-docusate (SENOKOT-S/PERICOLACE) 8.6-50 MG per tablet 1-2 tablet  1-2 tablet Oral BID PRN Corrina Hurt PA-C        sodium chloride (PF) 0.9% PF flush 10 mL  10 mL Intracatheter Q15 Min PRN Tamica Garcia MD        sodium chloride (PF) 0.9% PF flush 10 mL  10 mL Intracatheter Q15 Min PRN Tamica Garcia MD        sodium chloride 0.9% BOLUS 100-150 mL  100-150 mL Intravenous Q15 Min PRN Shelley Garciaah Eloisa, MD            PHYSICAL EXAM  /57 (BP Location: Left arm)   Pulse 70   Temp 97.8  F (36.6  C) (Axillary)   Resp 16   Ht 1.549 m (5' 0.98\")   Wt 59.1 kg (130 lb 4.7 oz)   LMP  (LMP Unknown)   SpO2 93%   BMI 24.63 " kg/m    Gen: NAD, lying in bed  HEENT: NC/AT, MMM  Cardio: appears well-perfused  Pulm: non-labored on room air  Abd: soft, non-tender, non-distended  Ext: some edema in RLE; charcot foot deformity on left; R residual limb wrapped in kerlix, wound not visualized on this date  Neuro/MSK: awake, alert, moving all extremities actively in bed    LABS  CBC RESULTS:   Recent Labs   Lab Test 04/20/24  1821 04/20/24  0645 04/19/24  0550 04/18/24  0604 04/17/24  0636 04/16/24  0642 04/15/24  0536   WBC  --   --   --   --  8.5 8.7 8.0   RBC  --   --   --   --  2.85* 3.13* 2.98*   HGB 8.6* 7.4* 8.3*   < > 8.5* 9.2* 8.9*   HCT  --   --   --   --  25.9* 28.3* 26.4*   MCV  --   --   --   --  91 90 89   MCH  --   --   --   --  29.8 29.4 29.9   MCHC  --   --   --   --  32.8 32.5 33.7   RDW  --   --   --   --  16.9* 16.3* 15.7*   PLT  --  170  --   --  225 227 202    < > = values in this interval not displayed.       Last Basic Metabolic Panel:  Recent Labs   Lab Test 04/23/24  0602 04/22/24  0553 04/19/24  2132 04/19/24  0550 04/19/24  0201 04/17/24  2200 04/17/24  0636 04/17/24  0635 04/16/24  0642   *  --   --   --   --   --  131*  --  128*   POTASSIUM 4.3 4.2  --  4.4  --    < > 3.9  --  4.0   CHLORIDE 92*  --   --   --   --   --  95*  --  93*   CO2 22  --   --   --   --   --  22  --  19*   ANIONGAP 10  --   --   --   --   --  14  --  16*   GLC 74  --  81  --  72   < > 82   < > 81   BUN 52.7*  --   --   --   --   --  41.2*  --  48.0*   CR 2.83*  --   --  2.65*  --   --  2.01*  --  2.55*   GFRESTIMATED 18*  --   --  19*  --   --  27*  --  20*   SONIA 7.4*  --   --   --   --   --  7.7*  --  7.7*    < > = values in this interval not displayed.         Rehabilitation - continue comprehensive acute inpatient rehabilitation program with multidisciplinary approach including therapies, rehab nursing, and physiatry following. See interval history for updates.      ASSESSMENT AND PLAN  Shirley Cammy Vangquin is a 63 year old right hand  dominant female with complicated past medical history including but not limited to PAD with multiple prior bilateral lower extremity vascular bypass grafts and thrombectomies (most recently 10/2023 right common femoral to proximal anterior tibial PTFE bypass graft), bilateral Charcot-Breonna-Tooth foot deformity, prior bilateral carotid endarterectomies, B-cell lymphoma, CAD (hx Mix3), hypertension, hyperlipidemia, GI bleed (12/2023), type 2 diabetes mellitus, COPD, tobacco use disorder, iron deficiency anemia, GERD, Celiac disease, Takotsubo cardiomyopathy, SVT, depression/anxiety and spongiotic dermatitis who was admitted on 3/29/24 with acute occlusion of right lower extremity arterial bypass graft now s/p right above-knee amputation 4/1/24, completed 4/9/24 with hospital course complicated by acute renal failure now on dialysis, sepsis, acute blood loss anemia, acute post-operative pain, nausea, loose stools, delirium, malnutrition, and multiple electrolyte derangements.  She is now admitted to ARU on 4/22/24 for multidisciplinary rehabilitation and ongoing medical management.        Admission to acute inpatient rehab 04/22/24.    Impairment group code: Amputation 05.3 Unilateral LE AKA; emergent R AKA due to acute occlusion of RLE bypass graft         PT and OT 90 minutes of each daily for 6 days per week in addition to rehab nursing and close management of physiatrist.       Impairment of ADL's: Noted to have impaired activity tolerance, impaired balance, impaired strength, impaired weight shifting, and pain, all affecting her ability to safely and independently perform basic ADLs.  Goal for mod I with basic wheelchair-based ADLs with assist for bathing, as well as assist IADLs/heavier activities.     Impairment of mobility:  Noted to have impaired activity tolerance, impaired balance, impaired strength, impaired weight shifting, and pain, all affecting her ability to safely and independently perform basic  mobility.  Goal for mod I with basic wheelchair-based mobility.     Impairment of cognition/language/swallow:  Noted to have dysphagia with goals for safe tolerance of least restrictive diet.  However, IP SLP noting did not anticipate further SLP services at ARU, no issues with tolerating regular diet.  Will not consult initially.  If any concern for difficulty tolerating current diet, can consult.     Medical Conditions  New actions/orders/updates for today are in blue.     S/p right AKA 4/1/24, completed 4/9/24 due to critical limb ischemia, acute occlusion of right common femoral artery to mid anterior tibial artery bypass graft   PAD/PVD with hx multiple prior vascular interventions to BLE  Bilateral Charcot-Breonna-Tooth foot deformities  Acute post-op pain  - Wound care: daily dressing changes to right AKA with xeroform and gauze with kerlix to keep wound protected  - NWB RLE  - Continue PTA L foot custom orthotic   - Continue Plavix 75 mg daily (given hx left bypass).  No ongoing need for Xarelto per vascular (no recent DVT or PE)  - Pain management: APAP 500-1000 mg q6h PRN, dilaudid 2 mg q6h PRN.  Wean opioids as able.  Is having some phantom pain/sensations.  Previously had been on gabapentin (diabetic neuropathy), which was held this admission due to acute renal failure and sedation.  Was started by attending physician on ARU admission.  Will reduce to gabapentin 100 mg 3x/week after HD per renal/HD dosing and pharmacy recs.  Monitor for pain relief and sedation.  - Continue PT/OT  - Follow up with vascular surgery in 2-4 weeks     Acute blood loss anemia on anemia of chronic disease, hx iron deficiency  Retroperitoneal hematoma  Recent GI bleed (hospitalized 12/2023)  Required intermittent transfusion during this admission (3/30, 3/31, 4/2, 4/6, 4/9, 4/13).  Repeat CT abdomen on 4/20 with stable right retroperitoneal hematoma; no s/o active bleed.  - Continue epo/venofer with HD per nephrology  - S/p  retacrit 10k units today per nephrology  - Trend CBC every T/Th/Sat  - Transfuse for Hgb <7     Acute renal failure on HD  Hyponatremia  Normal baseline Cr, though per nephrology, suspect some modest CKD.  LIMA this admission, up to peak Cr 4/22 on 4/3.  Likely BAILEY +/- rhabdo +/- ischemic injury with no signs of recovery and now dialysis dependent (started 4/3/24).  S/p tunneled dialysis catheter placement 4/3/24.  4/23: Cr up to 2.83; Na down further at 124.  - Nephrology consulted, appreciate ongoing assistance  - HD T/Th/Sat at ARU or per nephrology recs  - Trend BMP/mag/phos on HD days  - Per nephrology, recommending to further restrict fluids to 1L daily  - Avoid NSAIDs/nephrotoxins, renally dose medications     Bilateral pleural effusion  Noted on CT abdomen 4/20 with moderate b/l pleural effusion (left>right).  Has been intermittently requiring 1-2L supplemental oxygen.  Unclear if related to volume given new renal failure on HD, also baseline COPD.  - Monitor respiratory status, supplemental O2 by NC PRN to maintain sats >88%  - Consider thoracentesis if symptomatic or worsening hypoxia      Celiac disease  Colon distension   Loose stools, improving  Nausea/vomiting, improving  Severe malnutrition in context of acute on chronic illness  Pill cam study with [facility] in 3/2024 (due to recent GI bleed), which revealed mild non-erosive red tissue in the duodenum, some atrophic type appearance that could be celiac disease.  This was followed by celiac labs on 3/20/24 (Gliadin ab and tTG IgA antibody) which were positive and thus Celiac diagnosis was established and she was recommended for gluten-free diet.  This admission, noted to have colonic distension and circumferential wall thickening of the distal colon and rectum concerning for proctocolitis on CT abdomen.  Also with loose stools, nausea.  Despite this, patient declined gluten-free diet (switched on 4/18).  Noted to have improvement in loose stools and nausea  at discharge to ARU per sending team.  - Per discussion with RD, given K/phos are WNL and patient feeling restricted by renal diet, consider liberalizing to 3g Na diet.  Will also continue to educate/encourage gluten-free diet, which patient may be willing to consider if otherwise less restricted.  RD will continue to follow, appreciate assist.  - RD consulted, appreciate assistance  - Continue scopolamine patch for now (started 4/18), wean as able  - PRN Zofran  - Continue probiotics BID  - Monitor symptoms  - Follow up with [facility] as outpatient    Hypomagnesemia  4/23: mag 1.5  - Replace per protocol with mag sulfate 2g IV x1 (avoiding PO given recent loose stools).  Recheck at 1800     Hypocalcemia  4/23: Ca corrects to 8.7 for hypoalbuminemia  - Per pharmacy, was on PTA calcium carbonate/vit D PTA (had run out 1 week before admission), not ordered while at hospital.  If corrected Ca remains low, reach out to nephrology to consider if resumption indicated  - Continue to trend    CAD (hx MI x3)  HTN  Hyperlipidemia  Hx Takotsubo CM  Hx SVT  Last coronary angiogram completed 10/2023.  Recovered EF (55-60%) from ECHO 11/2023.  - Continue PTA Coreg 6.25 mg BID, amlodipine 5 mg daily  - Hold PTA lisinopril 10 mg daily due to renal failure  - PTA Jardiance 10 mg daily discontinued due to renal failure  - Rosuvastatin 10 mg daily resumed on ARU admission (previously held due to concern for rhabdo).  Monitor hepatic panel in 1 week    - Monitor BP     Hx bilateral carotid artery stenosis s/p bilateral carotid endarterectomies  - Follow up with vascular surgery for annual carotid duplex (last done on 1/4/24 with stable stenosis of right carotid bulb)     Diabetes mellitus, type II  Per chart review.  A1c this admission 5.2%.  PTA Jardiance and gabapentin discontinued due to acute renal failure.  BG well-controlled during this admission without need for insulin.  - Monitor BG periodically with labs     B-cell lymphoma, stage  VALENTIN  Followed by [facility] (Dr. Barrow).  Mild radiographic progression on CT 1/26/24.  Given asymptomatic, plans at that time for ongoing CT monitoring.  - Monitor for development of any B symptoms  - Trend CBC  - Follow up with oncology, repeat CT as planned in 7/2024     COPD  Tobacco use disorder  PTA smoking ~5 cigarettes per day  - Monitor respiratory status, supplemental O2 by NC PRN to maintain sats >88%  - Continue PTA Breo Ellipta  - Nicotine patch 14 mcg/day  - Ongoing education/resources for cessation     Hx MDD/anxiety  Delirium, resolved  Not on medications PTA.  Patient denies any hx depression/anxiety but does note depressed mood in setting of recent AKA and significant home stressors.  - Psychology and spiritual services consulted, appreciate assist  - Monitor mood     GERD  PTA on omeprazole 20 mg daily  - Continue pepcid 20 mg q48 hours (renally dosed) given allergy to pantoprazole (formulary sub for omeprazole)     Spongiotic dermatitis  Recently seen by [facility] dermatology, recommended betamethasone cream BID PRN, not using regularly at hospital  - Consider resumption of topical steroids if recurrent symptoms  - Can order claritin or Zyrtec if itching     Sacral wound: pressure injury (stage 2 vs 3), incontinence-associated dermatitis, moisture-associated skin damage  Assessed by WOCN on 4/22 at acute hospital.  - WOCN consulted for ongoing follow-up at ARU  - Wound care per WOCN orders  - Pressure reduction strategies     Adjustment to disability:  Clinical psychology to eval and treat if indicated  FEN: 3g Na diet, 1000 mL fluid restriction  Bowel: incontinent, recent loose stools as above  Bladder: incontinent, monitor PVRs at admission  DVT Prophylaxis: subcutaneous heparin  GI Prophylaxis: pepcid  Code: full, confirmed on admission  Disposition: goal for home  ELOS:  21 days pending ongoing therapy evals  Follow up Appointments on Discharge: PCP in 1-2 weeks, vascular surgery in 2 weeks,  [facility], nephrology (ongoing HD), heme/onc ([facility], 7/2024)      35 minutes spent on the date of service doing chart review, history and exam, documentation, and further activities as noted above.       Plan of care was discussed with Dr. Arun Pimentel, PM&R Staff Physician    Corrina Hurt PA-C  Physical Medicine & Rehabilitation

## 2024-04-23 NOTE — PLAN OF CARE
Acute Rehab Care Conference/Team Rounds      Type: Team Rounds    Present: Dr. Pimentel, Corrina Hurt PA, Dr. Salinas Neuropsychologist, Hellen Yang PT, Neli Morales OT, Donna Payne , Marleen Mccloud RD, Kailash Smith RN, and Shirley Vangquin Patient.      Discharge Barriers/Treatment/Education    Rehab Diagnosis: PVD, s/p right AKA    Active Medical Co-morbidities/Prognosis:   Patient Active Problem List   Diagnosis    Reflux esophagitis    Health Care Home    Osteoporosis    Cervicalgia    Hyperlipidemia LDL goal <70    PAD (peripheral artery disease) (H24)    Essential hypertension    Coronary artery disease involving native coronary artery of native heart without angina pectoris    Primary osteoarthritis involving multiple joints    DDD (degenerative disc disease), lumbar    S/P carotid endarterectomy    Chronic allergic rhinitis due to animal hair and dander    Tobacco use disorder    Chronic obstructive pulmonary disease, unspecified COPD type (H)    Anxiety    Vitamin C deficiency    History of colonic polyps    SVT (supraventricular tachycardia) (H24)    Bilateral carpal tunnel syndrome    Charcot-Breonna-Tooth disease type 1A    Atherosclerosis of bypass graft of right lower extremity with other clinical manifestation (H24)    Pseudoaneurysm of femoral artery following procedure (H24)    NSTEMI (non-ST elevated myocardial infarction) (H)    Acute reaction to situational stress    Acute on chronic anemia    Lymphoma of lymph nodes of neck, unspecified lymphoma type (H)    Arterial occlusion    S/P AKA (above knee amputation) unilateral, right (H)         Safety: Alarms on    Pain: Denies pain at this time    Medications, Skin, Tubes/Lines: Takes pills whole, skin intact, has a CVC to the R chest for HD    Swallowing/Nutrition:    Bowel/Bladder: Continent LBM 4/22    Psychosocial: SW assessment pending.    ADLs/IADLs: Eval pending    Mobility: Assist of x 2 with liko. Evals pending.      Cognition/Language:    Community Re-Entry:    Transportation:    Decision maker: self    Plan of Care and goals reviewed and updated.    Discharge Plan/Recommendations    Fall Precautions: continue    Estimated length of stay: 21 days     Overall plan for the patient: Provide high intensity therapy to address deficits in ADLs secondary to right AKA, wound management.      Utilization Review and Continued Stay Justification    Medical Necessity Criteria:    For any criteria that is not met, please document reason and plan for discharge, transfer, or modification of plan of care to address.    Requires intensive rehabilitation program to treat functional deficits?: Yes    Requires 3x per week or greater involvement of rehabilitation physician to oversee rehabilitation program?: Yes    Requires rehabilitation nursing interventions?: Yes    Patient is making functional progress?: Yes    There is a potential for additional functional progress? Yes    Patient is participating in therapy 3 hours per day a minimum of 5 days per week or 15 hours per week in 7 day period?:Yes    Has discharge needs that require coordinated discharge planning approach?:Yes        Final Physician Sign off    Statement of Approval: I have reviewed this document and aprove its content.    Patient Goals  Social Work Goals: Confirm discharge recommendations with therapy, coordinate safe discharge plan and remain available to support and assist as needed.    OT Predicted Duration/Target Date for Goal Attainment: 05/14/24  Therapy Frequency (OT): 6 times/week  OT: Hygiene/Grooming: modified independent  OT: Upper Body Dressing: Modified independent  OT: Lower Body Dressing: Modified independent  OT: Upper Body Bathing: Supervision/stand-by assist  OT: Lower Body Bathing: Supervision/stand-by assist     OT: Transfer: Supervision/stand-by assist (tub transfer)  OT: Toilet Transfer/Toileting: Modified independent, toilet transfer, cleaning and  garment management, within precautions  OT: Meal Preparation: Modified independent, with simple meal preparation, from wheelchair        PT Predicted Duration/Target Date for Goal Attainment: 05/13/24  PT Frequency: 6x/week  PT: Bed Mobility: Independent  PT: Transfers: Modified independent (slideboard)     PT: Stairs: 3 stairs, Minimal assist  PT: Wheelchair Mobility: 150 feet, manual wheelchair     PT: Goal 1: Car transfer w/ Siddharth using slideboard  PT: Goal 2: Pt will demonstrate ind w/ AKA HEP focusing on LE strength and ROM.        Patient Goal:  Pain Management: Patient will verbalize pain for managment and cooperate for both pharmacological and non-pharmacological pain managment.  Goal: Wound Management: Patient will cooperate with staff for wound care to be free of wound infection during her stay in ARU.           Goal: Skin Integrity: Patient will position independently in bed/chair to prevent pressure injury and skin break down during her stay in ARU.           Goal: Safety Management: Patient will use call light and wait for staff assistance for all transfer to ensure safety and prevent falls.            Goal Outcome Evaluation:      Plan of Care Reviewed With: patient    Overall Patient Progress: no changeOverall Patient Progress: no change

## 2024-04-24 ENCOUNTER — APPOINTMENT (OUTPATIENT)
Dept: PHYSICAL THERAPY | Facility: CLINIC | Age: 64
DRG: 559 | End: 2024-04-24
Attending: PHYSICAL MEDICINE & REHABILITATION
Payer: COMMERCIAL

## 2024-04-24 ENCOUNTER — APPOINTMENT (OUTPATIENT)
Dept: OCCUPATIONAL THERAPY | Facility: CLINIC | Age: 64
DRG: 559 | End: 2024-04-24
Attending: PHYSICAL MEDICINE & REHABILITATION
Payer: COMMERCIAL

## 2024-04-24 PROCEDURE — 97162 PT EVAL MOD COMPLEX 30 MIN: CPT | Mod: GP

## 2024-04-24 PROCEDURE — 97530 THERAPEUTIC ACTIVITIES: CPT | Mod: GO

## 2024-04-24 PROCEDURE — 128N000003 HC R&B REHAB

## 2024-04-24 PROCEDURE — 97530 THERAPEUTIC ACTIVITIES: CPT | Mod: GP

## 2024-04-24 PROCEDURE — 250N000011 HC RX IP 250 OP 636: Performed by: PHYSICIAN ASSISTANT

## 2024-04-24 PROCEDURE — 250N000013 HC RX MED GY IP 250 OP 250 PS 637: Performed by: PHYSICIAN ASSISTANT

## 2024-04-24 PROCEDURE — 97535 SELF CARE MNGMENT TRAINING: CPT | Mod: GO

## 2024-04-24 PROCEDURE — 90791 PSYCH DIAGNOSTIC EVALUATION: CPT | Performed by: CLINICAL NEUROPSYCHOLOGIST

## 2024-04-24 PROCEDURE — 97110 THERAPEUTIC EXERCISES: CPT | Mod: GP

## 2024-04-24 RX ORDER — DIPHENOXYLATE HCL/ATROPINE 2.5-.025MG
1 TABLET ORAL 4 TIMES DAILY PRN
Status: DISCONTINUED | OUTPATIENT
Start: 2024-04-24 | End: 2024-05-15 | Stop reason: HOSPADM

## 2024-04-24 RX ADMIN — MICONAZOLE NITRATE: 20 POWDER TOPICAL at 20:03

## 2024-04-24 RX ADMIN — Medication 1 CAPSULE: at 20:03

## 2024-04-24 RX ADMIN — AMLODIPINE BESYLATE 5 MG: 5 TABLET ORAL at 08:13

## 2024-04-24 RX ADMIN — HEPARIN SODIUM 5000 UNITS: 5000 INJECTION, SOLUTION INTRAVENOUS; SUBCUTANEOUS at 20:03

## 2024-04-24 RX ADMIN — ACETAMINOPHEN 1000 MG: 500 TABLET ORAL at 16:50

## 2024-04-24 RX ADMIN — CLOPIDOGREL BISULFATE 75 MG: 75 TABLET ORAL at 08:13

## 2024-04-24 RX ADMIN — CARVEDILOL 6.25 MG: 6.25 TABLET, FILM COATED ORAL at 08:13

## 2024-04-24 RX ADMIN — Medication 1 CAPSULE: at 08:13

## 2024-04-24 RX ADMIN — HEPARIN SODIUM 5000 UNITS: 5000 INJECTION, SOLUTION INTRAVENOUS; SUBCUTANEOUS at 08:13

## 2024-04-24 RX ADMIN — FLUTICASONE FUROATE AND VILANTEROL TRIFENATATE 1 PUFF: 200; 25 POWDER RESPIRATORY (INHALATION) at 08:15

## 2024-04-24 RX ADMIN — DIPHENOXYLATE HYDROCHLORIDE AND ATROPINE SULFATE 1 TABLET: 2.5; .025 TABLET ORAL at 19:55

## 2024-04-24 RX ADMIN — HYDROMORPHONE HYDROCHLORIDE 2 MG: 2 TABLET ORAL at 16:51

## 2024-04-24 RX ADMIN — NICOTINE 1 PATCH: 14 PATCH, EXTENDED RELEASE TRANSDERMAL at 08:13

## 2024-04-24 RX ADMIN — CARVEDILOL 6.25 MG: 6.25 TABLET, FILM COATED ORAL at 16:50

## 2024-04-24 RX ADMIN — HYDROMORPHONE HYDROCHLORIDE 2 MG: 2 TABLET ORAL at 10:47

## 2024-04-24 RX ADMIN — FAMOTIDINE 20 MG: 20 TABLET ORAL at 16:50

## 2024-04-24 RX ADMIN — ACETAMINOPHEN 1000 MG: 500 TABLET ORAL at 10:47

## 2024-04-24 RX ADMIN — ROSUVASTATIN CALCIUM 10 MG: 5 TABLET, COATED ORAL at 20:03

## 2024-04-24 ASSESSMENT — ACTIVITIES OF DAILY LIVING (ADL)
ADLS_ACUITY_SCORE: 37

## 2024-04-24 NOTE — PLAN OF CARE
Discharge Plan: home with HH PT      Precautions: fall, NWB of RLE, dialysis, sacral wound, elevate residual limb when in bed    Current Status:  Bed Mobility: ind rolling, modA sup<>sit  Transfer: Slideboard minAx1 w/ therapies. Liko Ax2 w/ nsg  Gait: unable, unsafe  Stairs: not tested  Balance: Stable dynamic sitting.     Assessment:  Pt presents below baseline s/p R AKA and complex hospital stay. Improved mood today and agreeable to participate in therapies. Expect pt will be w/c based at discharge w/ goal of Jose w/ slideboard transfers. Patient will benefit from ongoing therapies to promote improved safety and ind w/ mobility. ELOS 3 weeks.     Other Barriers to Discharge (DME, Family Training, etc):   DME: K3 w/c, likely slideboard     Need to confirm family support.
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
## 2024-04-24 NOTE — PLAN OF CARE
Goal Outcome Evaluation:      Plan of Care Reviewed With: patient    Overall Patient Progress: no changeOverall Patient Progress: no change    Outcome Evaluation: Patient is alert oriented. A-2 lift transfer. Renal diet/thin fair appettite. Magnesium IV administered at 1730 Left PIV saline locked. VS WDL patient had small loose stool x 3. No care concern at this time. Call light is with in reach alarm is on.

## 2024-04-24 NOTE — PLAN OF CARE
Discharge Planner Post-Acute Rehab OT:     Discharge Plan: home with HH OT     Precautions: fall, NWB of RLE, dialysis, sacral wound, elevate residual limb when in bed    Current Status:  ADLs:  Mobility: WC based, liko lift Ax2  Grooming: set up seated  Dressing: UB: set up, LB: max A  Bathing: TBD  Toileting: liko lift to commode  IADLs: Pt does most IADL including caregiving for .   Vision/Cognition: intact    Assessment: Good participation this date, very motivated to be OOB. Expressed wanting resources/support for new AKA. Good use of SB for functional tf, use of BUE for support and core strength to complete successfully.     Other Barriers to Discharge (DME, Family Training, etc): DME, family training, pt has limited support despite living with multiple family members.

## 2024-04-24 NOTE — PLAN OF CARE
Goal Outcome Evaluation:    No acute issue in the night, no episodes of loose stool, pt is call light appropriate. Left PIV dressing CDI. Assist of 2 with transfer by lift. Call light within reach. Continue with plan of care.
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
## 2024-04-24 NOTE — PLAN OF CARE
VS: Temp: 98.4  F (36.9  C) Temp src: Oral BP: 138/58 Pulse: 65   Resp: 18 SpO2: 96 % O2 Device: None (Room air)      O2: >92% RA   Output: Utilizes BSC, voids without difficulties   Last BM: 4/24/24   Activity: A2 w/ lift   Skin: Sacral wound  R AKA wound   Pain: Rated pain 10/10 after therapy session, PRN dilaudid and tylenol administered w/ relief.   CMS: A&O x4. Able to make needs known.   Dressing: Sacral wound and R AKA wound cares completed per order.  R AKA wound dehisced from R lateral side, provider was updated.   Diet: 3gm NA diet  1000ml FR   LDA: L PIV  R chest CVC   Equipment: BSC   Plan: TBD. Call light is in reach, continue w/ POC.   Additional Info: Pt is on HD

## 2024-04-24 NOTE — PROGRESS NOTES
CLINICAL NUTRITION SERVICES - BRIEF NOTE      Nutrition Prescription     RECOMMENDATIONS FOR MDs/PROVIDERS TO ORDER:  - Recommend keeping diet as liberalized as medically able to encourage oral intake  - Appreciate encouragement surrounding PO intake   - Strongly recommended pt follow Gluten Free diet - pt refused    Malnutrition Status:    Severe malnutrition in the context of acute illness.      Recommendations already ordered by Registered Dietitian (RD):  - Ensure Shake (chocolate) at 2 pm  - Manager check on all meal trays  - Nutrition education: gluten free diet. Pt declined to follow GF diet and did not want handout     Future/Additional Recommendations:  Monitor PO intake, supplement use, labs, and weight trends    *Please see full assessment note from 4/23/24    New Findings:  Met with pt at bedside. Pt denies ever having abdominal pain. She has had some N/V but relates this to dialysis. She has been having loose stools which she thinks is due to not taking her fiber gummy in the morning. We discussed the recommendation to follow a gluten free diet given celiac diagnosis. Pt does not want to follow this diet. She was not accepting of handouts. Strongly encouraged pt follow gluten free diet. Discussed increased protein needs with dialysis. Reviewed protein sources with pt.     MALNUTRITION  % Intake: < 75% for > 7 days (moderate)  % Weight Loss: None noted  Subcutaneous Fat Loss: Facial region: moderate and Upper arm: moderate  Muscle Loss: Temporal: moderate, Facial & jaw region: moderate, Thoracic region (clavicle, acromium bone, deltoid, trapezius, pectoral): moderate, Upper arm (bicep, tricep): moderate, and Lower arm  (forearm): moderate  Fluid Accumulation/Edema: Mild  Malnutrition Diagnosis: Severe malnutrition in the context of acute illness.     Interventions  Collaboration with other providers  Medical food supplement therapy  Nutrition education for recommended modifications    RD to follow per  protocol.    Marleen Mccloud RD, KRYS  Available via phone and Vocera  Phone: [phone]  Vocera: 5R Acute Rehab Clinical Dietitian  Weekend/Holiday Vocera: Weekend Holiday Clinical Dietitian [Multi Site Groups]

## 2024-04-25 ENCOUNTER — APPOINTMENT (OUTPATIENT)
Dept: OCCUPATIONAL THERAPY | Facility: CLINIC | Age: 64
DRG: 559 | End: 2024-04-25
Attending: PHYSICAL MEDICINE & REHABILITATION
Payer: COMMERCIAL

## 2024-04-25 ENCOUNTER — APPOINTMENT (OUTPATIENT)
Dept: PHYSICAL THERAPY | Facility: CLINIC | Age: 64
DRG: 559 | End: 2024-04-25
Attending: PHYSICAL MEDICINE & REHABILITATION
Payer: COMMERCIAL

## 2024-04-25 LAB
ANION GAP SERPL CALCULATED.3IONS-SCNC: 7 MMOL/L (ref 7–15)
BUN SERPL-MCNC: 36.9 MG/DL (ref 8–23)
CALCIUM SERPL-MCNC: 7.7 MG/DL (ref 8.8–10.2)
CHLORIDE SERPL-SCNC: 95 MMOL/L (ref 98–107)
CREAT SERPL-MCNC: 2.76 MG/DL (ref 0.51–0.95)
DEPRECATED HCO3 PLAS-SCNC: 24 MMOL/L (ref 22–29)
EGFRCR SERPLBLD CKD-EPI 2021: 18 ML/MIN/1.73M2
ERYTHROCYTE [DISTWIDTH] IN BLOOD BY AUTOMATED COUNT: 18 % (ref 10–15)
GLUCOSE SERPL-MCNC: 86 MG/DL (ref 70–99)
HCT VFR BLD AUTO: 27.7 % (ref 35–47)
HGB BLD-MCNC: 9 G/DL (ref 11.7–15.7)
MAGNESIUM SERPL-MCNC: 2.3 MG/DL (ref 1.7–2.3)
MCH RBC QN AUTO: 30.5 PG (ref 26.5–33)
MCHC RBC AUTO-ENTMCNC: 32.5 G/DL (ref 31.5–36.5)
MCV RBC AUTO: 94 FL (ref 78–100)
PHOSPHATE SERPL-MCNC: 2.8 MG/DL (ref 2.5–4.5)
PLATELET # BLD AUTO: 282 10E3/UL (ref 150–450)
POTASSIUM SERPL-SCNC: 4.3 MMOL/L (ref 3.4–5.3)
RBC # BLD AUTO: 2.95 10E6/UL (ref 3.8–5.2)
SODIUM SERPL-SCNC: 126 MMOL/L (ref 135–145)
WBC # BLD AUTO: 4.4 10E3/UL (ref 4–11)

## 2024-04-25 PROCEDURE — 634N000001 HC RX 634: Mod: JZ | Performed by: INTERNAL MEDICINE

## 2024-04-25 PROCEDURE — 250N000011 HC RX IP 250 OP 636: Performed by: PHYSICIAN ASSISTANT

## 2024-04-25 PROCEDURE — 258N000003 HC RX IP 258 OP 636: Performed by: INTERNAL MEDICINE

## 2024-04-25 PROCEDURE — 83735 ASSAY OF MAGNESIUM: CPT | Performed by: PHYSICIAN ASSISTANT

## 2024-04-25 PROCEDURE — 36415 COLL VENOUS BLD VENIPUNCTURE: CPT | Performed by: PHYSICIAN ASSISTANT

## 2024-04-25 PROCEDURE — 250N000013 HC RX MED GY IP 250 OP 250 PS 637: Performed by: PHYSICIAN ASSISTANT

## 2024-04-25 PROCEDURE — 250N000011 HC RX IP 250 OP 636: Performed by: INTERNAL MEDICINE

## 2024-04-25 PROCEDURE — 90935 HEMODIALYSIS ONE EVALUATION: CPT

## 2024-04-25 PROCEDURE — 128N000003 HC R&B REHAB

## 2024-04-25 PROCEDURE — 85027 COMPLETE CBC AUTOMATED: CPT | Performed by: PHYSICIAN ASSISTANT

## 2024-04-25 PROCEDURE — 250N000011 HC RX IP 250 OP 636

## 2024-04-25 PROCEDURE — 97535 SELF CARE MNGMENT TRAINING: CPT | Mod: GO | Performed by: STUDENT IN AN ORGANIZED HEALTH CARE EDUCATION/TRAINING PROGRAM

## 2024-04-25 PROCEDURE — 99233 SBSQ HOSP IP/OBS HIGH 50: CPT | Mod: 24 | Performed by: INTERNAL MEDICINE

## 2024-04-25 PROCEDURE — 97530 THERAPEUTIC ACTIVITIES: CPT | Mod: GP

## 2024-04-25 PROCEDURE — 99232 SBSQ HOSP IP/OBS MODERATE 35: CPT | Performed by: PHYSICIAN ASSISTANT

## 2024-04-25 PROCEDURE — 84100 ASSAY OF PHOSPHORUS: CPT | Performed by: PHYSICIAN ASSISTANT

## 2024-04-25 PROCEDURE — 80048 BASIC METABOLIC PNL TOTAL CA: CPT | Performed by: PHYSICIAN ASSISTANT

## 2024-04-25 PROCEDURE — 97110 THERAPEUTIC EXERCISES: CPT | Mod: GP

## 2024-04-25 RX ORDER — ONDANSETRON 2 MG/ML
4 INJECTION INTRAMUSCULAR; INTRAVENOUS ONCE
Status: COMPLETED | OUTPATIENT
Start: 2024-04-25 | End: 2024-04-25

## 2024-04-25 RX ORDER — CETIRIZINE HYDROCHLORIDE 5 MG/1
5 TABLET ORAL DAILY
Status: DISCONTINUED | OUTPATIENT
Start: 2024-04-25 | End: 2024-05-15 | Stop reason: HOSPADM

## 2024-04-25 RX ORDER — ONDANSETRON 2 MG/ML
INJECTION INTRAMUSCULAR; INTRAVENOUS
Status: COMPLETED
Start: 2024-04-25 | End: 2024-04-25

## 2024-04-25 RX ADMIN — ONDANSETRON 4 MG: 2 INJECTION INTRAMUSCULAR; INTRAVENOUS at 15:35

## 2024-04-25 RX ADMIN — ROSUVASTATIN CALCIUM 10 MG: 5 TABLET, COATED ORAL at 19:41

## 2024-04-25 RX ADMIN — Medication 1 CAPSULE: at 19:41

## 2024-04-25 RX ADMIN — HEPARIN SODIUM 1900 UNITS: 1000 INJECTION, SOLUTION INTRAVENOUS; SUBCUTANEOUS at 19:24

## 2024-04-25 RX ADMIN — CLOPIDOGREL BISULFATE 75 MG: 75 TABLET ORAL at 07:58

## 2024-04-25 RX ADMIN — Medication 1 CAPSULE: at 07:58

## 2024-04-25 RX ADMIN — HEPARIN SODIUM 1900 UNITS: 1000 INJECTION, SOLUTION INTRAVENOUS; SUBCUTANEOUS at 19:22

## 2024-04-25 RX ADMIN — ONDANSETRON 4 MG: 4 TABLET, ORALLY DISINTEGRATING ORAL at 10:41

## 2024-04-25 RX ADMIN — EPOETIN ALFA-EPBX 10000 UNITS: 10000 INJECTION, SOLUTION INTRAVENOUS; SUBCUTANEOUS at 12:51

## 2024-04-25 RX ADMIN — HEPARIN SODIUM 5000 UNITS: 5000 INJECTION, SOLUTION INTRAVENOUS; SUBCUTANEOUS at 08:00

## 2024-04-25 RX ADMIN — HEPARIN SODIUM 5000 UNITS: 5000 INJECTION, SOLUTION INTRAVENOUS; SUBCUTANEOUS at 19:41

## 2024-04-25 RX ADMIN — CARVEDILOL 6.25 MG: 6.25 TABLET, FILM COATED ORAL at 17:37

## 2024-04-25 RX ADMIN — ACETAMINOPHEN 1000 MG: 500 TABLET ORAL at 17:43

## 2024-04-25 RX ADMIN — SODIUM CHLORIDE 300 ML: 9 INJECTION, SOLUTION INTRAVENOUS at 12:15

## 2024-04-25 RX ADMIN — NICOTINE 1 PATCH: 14 PATCH, EXTENDED RELEASE TRANSDERMAL at 07:58

## 2024-04-25 RX ADMIN — HYDROMORPHONE HYDROCHLORIDE 2 MG: 2 TABLET ORAL at 09:42

## 2024-04-25 RX ADMIN — HYDROMORPHONE HYDROCHLORIDE 2 MG: 2 TABLET ORAL at 18:44

## 2024-04-25 RX ADMIN — DIPHENOXYLATE HYDROCHLORIDE AND ATROPINE SULFATE 1 TABLET: 2.5; .025 TABLET ORAL at 19:41

## 2024-04-25 RX ADMIN — MICONAZOLE NITRATE: 20 POWDER TOPICAL at 20:54

## 2024-04-25 RX ADMIN — GABAPENTIN 100 MG: 100 CAPSULE ORAL at 20:42

## 2024-04-25 RX ADMIN — Medication 1 CAPSULE: at 08:01

## 2024-04-25 RX ADMIN — SODIUM CHLORIDE 250 ML: 9 INJECTION, SOLUTION INTRAVENOUS at 12:15

## 2024-04-25 ASSESSMENT — ACTIVITIES OF DAILY LIVING (ADL)
ADLS_ACUITY_SCORE: 37

## 2024-04-25 NOTE — PLAN OF CARE
Patient alert and oriented. Slept through this shift. Denied pain or any discomfort. Had loose stool earlier in the shift. Lomotil administered with effective results. No loose stools noted at this time. Sacral and Right AKA wound C/D/I  PIV patent and flushes well. Right CVC in place. No care concerns voiced at this time. Call light within reach. Continue with plan of care.

## 2024-04-25 NOTE — PLAN OF CARE
Individualized Overall Plan Of Care (IOPOC)      Rehab diagnosis/Impairment Group Code: Amputation 05.3 unilateral le aka; emergent r aka due to acute occlusion of rle bypass graft  S/p aka (above knee amputation) unilateral, right (h)     Expected functional outcome: To reach a level of modified independence    Clinical Impression Comments: 63-year-old right-hand-dominant female admitted to ARU s/p right AKA secondary to PVD with pertinent history of Charcot-Breonna-Tooth foot deformity.  Patient has complex past medical history including multiple vascular bypass grafts and thrombectomies, bilateral carotid endarterectomies, B-cell lymphoma, CAD, hypertension, hyperlipidemia, GI bleed, type 2 diabetes, COPD, tobacco use disorder, iron deficiency anemia, GERD, celiac disease, Takotsubo cardiomyopathy, SVT, depression/anxiety.    Mobility: Pt presents below baseline s/p R AKA and complex hospital stay. Improved mood today and agreeable to participate in therapies. Expect pt will be w/c based at discharge w/ goal of Jose w/ slideboard transfers. Patient will benefit from ongoing therapies to promote improved safety and ind w/ mobility. ELOS 3 weeks.    ADL: Pt is below baseline post R AKA accompanied by other  medical complications. Pt will benefit from skilled OT.    Communication/Cognition/Swallow:       Intensity of therapy:   PT 90 minutes, 6x/week, for 3 weeks  OT 90 minutes, 6 times/week, for 3 weeks      Medical Prognosis: Fair prognosis to achieve stability of medical issues      Physician summary statement: Patient is going to be wheelchair-bound for remainder of her life.  The goal is to achieve independence with ADLs in wheelchair.    Discharge destination: prior home  Discharge rehabilitation needs: home care, PT, and OT      Estimated length of stay: 3 weeks      Rehabilitation Physician ELANA DIXON MD

## 2024-04-25 NOTE — PROGRESS NOTES
Nephrology Progress Note  04/25/2024         Assessment & Recommendations:   Shirley Hendricks is a 63 year old year old with peripheral artery disease, coronary artery disease and history of MI, hypertension, diabetes mellitus type 2 who is admitted to ARU following hospitalization for occlusion of right LE bypass graft managed with right above-the-knee amputation and complication of acute kidney injury requiring dialysis.     #Acute kidney injury: Felt to be due to contrast nephropathy as she received contrast 3 days in a row for evaluation and management of her occluded bypass graft.  There is also potentially some component of rhabdomyolysis.  -First dialysis run 4/3/2024.  -Access tunneled CVC initially placed 4/3/2024 and revised on 4/15/2024.  -Suspicion of underlying diabetic nephropathy and/or renovascular disease.    -Dialysis today and will continue dialysis on Tuesday Thursday Saturday schedule.  Note pleural effusion on imaging and so we will challenge volume status as able  -Urine output x 2 documented yesterday we will continue to monitor for renal recovery  - has been receiving hemodialysis at [facility] and [facility]     #Hyponatremia: Sodium is 126.  This is due to fluid intake in the absence of renal function.    - 1 L daily fluid restriction     #PAD/PVD  #Right common femoral artery to the mid anterior tibial artery bypass graft acute occlusion  #Right above-the-knee amputation 4/9/2024  - Plavix 75 mg daily (previously on Xarelto but no ongoing indication)     #Anemia due to acute blood loss with retroperitoneal hematoma and history of GI bleeding in December 2023.  - Has required transfusion with PRBCs.  -Has been receiving EPO 10K and Venofer with hemodialysis  -Will order hemoglobin for next dialysis treatment.  - retacrit 10K units three times weekly with HD     #Coronary artery disease with history of myocardial infarction x 3  #History of  "Takotsubo cardiomyopathy  PTA carvedilol and lisinopril, lisinopril has been on hold.  PTA rosuvastatin was initially held due to rhabdo but resumed.  PTA empagliflozin 10 mg daily has been discontinued given acute kidney injury     #Diabetes mellitus type 2  - Not requiring medications at this time     #Stage Mark marginal zone B-cell lymphoma  - Undergoing surveillance with [place] [facility]     Recommendations were communicated to primary team via note    Tamica Garcia MD   Division of Renal Disease and Hypertension  [employer]  Vocera Web Console    Interval History :   Nursing and provider notes from last 24 hours reviewed.  In the last 24 hours Shirley Hendricks did refuse some therapy.   During dialysis, catheter is not working well, lines reversed. Access alarms going off repeatedly, especially if she coughs or moves right arm.  She is still cold and having trouble staying warm. Very thirsty.    Review of Systems:   I reviewed the following systems:  GI: stable appetite. no nausea or vomiting, + diarrhea.   Neuro:  no confusion  Constitutional:  no fever or chills  CV: no dyspnea or edema.  no chest pain.    Physical Exam:   No intake/output data recorded.   BP (!) 179/73   Pulse 64   Temp 98.9  F (37.2  C) (Oral)   Resp 15   Ht 1.549 m (5' 0.98\")   Wt 56.1 kg (123 lb 10.9 oz)   LMP  (LMP Unknown)   SpO2 98%   BMI 23.38 kg/m       GENERAL APPEARANCE: no distress  EYES:  no scleral icterus, pupils equal  Mouth: dry mucosa  PULM: normal work of breathing  CV: no edema  INTEGUMENT: no cyanosis, no rash  NEURO:  speech fluent, face symmetric    Labs:   All labs reviewed by me  Electrolytes/Renal -   Recent Labs   Lab Test 04/25/24  0540 04/23/24  2136 04/23/24  0602 04/22/24  0553 04/19/24  2132 04/19/24  0550 04/17/24  2200 04/17/24  0636   *  --  124*  --   --   --   --  131*   POTASSIUM 4.3  --  4.3 4.2  --  4.4   < > 3.9   CHLORIDE 95*  --  92*  --   --   --   --  95*   CO2 24  --  22  --   " --   --   --  22   BUN 36.9*  --  52.7*  --   --   --   --  41.2*   CR 2.76*  --  2.83*  --   --  2.65*  --  2.01*   GLC 86  --  74  --  81  --    < > 82   SONIA 7.7*  --  7.4*  --   --   --   --  7.7*   MAG 2.3 2.1 1.5* 1.6*  --  1.7   < > 1.6*   PHOS 2.8  --  4.3 3.8  --  3.7   < > 3.0    < > = values in this interval not displayed.       CBC -   Recent Labs   Lab Test 04/25/24  0540 04/20/24  1821 04/20/24  0645 04/18/24  0604 04/17/24  0636 04/16/24  0642   WBC 4.4  --   --   --  8.5 8.7   HGB 9.0* 8.6* 7.4*   < > 8.5* 9.2*     --  170  --  225 227    < > = values in this interval not displayed.       LFTs -   Recent Labs   Lab Test 04/23/24  0602 04/17/24  0636 04/16/24  0642 04/15/24  0536 04/09/24  0636 04/04/24  0541   ALKPHOS 96  --   --   --  155* 192*   BILITOTAL 0.5  --   --   --  0.9 0.6   ALT 20  --   --   --  53* 326*   AST 16  --   --   --  68* 301*   PROTTOTAL 4.1*  --   --   --  3.9* 4.2*   ALBUMIN 1.9* 2.3* 2.1*   < > 1.8* 2.1*    < > = values in this interval not displayed.       Iron Panel -   Recent Labs   Lab Test 04/10/24  0559 04/09/24  1817 01/08/24  1542 12/11/23  0550   IRON 22*  --  71 122   IRONSAT 18  --  17 32   BRYN  --  609* 25 23       Current Medications:  Current Facility-Administered Medications   Medication Dose Route Frequency Provider Last Rate Last Admin    amLODIPine (NORVASC) tablet 5 mg  5 mg Oral Daily Corrina Hurt PA-C   5 mg at 04/24/24 0813    carvedilol (COREG) tablet 6.25 mg  6.25 mg Oral BID w/meals Corrina Hurt PA-C   6.25 mg at 04/24/24 1650    cetirizine (zyrTEC) tablet 5 mg  5 mg Oral Daily Corrina Hurt PA-C        clopidogrel (PLAVIX) tablet 75 mg  75 mg Oral Daily Corrina Hurt PA-C   75 mg at 04/25/24 0758    famotidine (PEPCID) tablet 20 mg  20 mg Oral Q48H Corrina Hurt PA-C   20 mg at 04/24/24 1650    fluticasone-vilanterol (BREO ELLIPTA) 200-25 MCG/ACT inhaler 1 puff  1 puff Inhalation Daily Corrina Hurt  COLE   1 puff at 04/24/24 0815    gabapentin (NEURONTIN) capsule 100 mg  100 mg Oral Once per day on Tuesday Thursday Saturday Corrina Hurt PA-C   100 mg at 04/23/24 2100    heparin 1000 unit/mL DIALYSIS Cath LOCK - RED Lumen  1.3-2.6 mL Intracatheter Once in dialysis/CRRT Tamica Garcia MD        heparin 1000 unit/mL DIALYSIS Cath LOCK -BLUE Lumen  1.3-2.6 mL Intracatheter Once in dialysis/CRRT Tamica Garcia MD        heparin ANTICOAGULANT injection 5,000 Units  5,000 Units Subcutaneous Q12H Corrina Hurt PA-C   5,000 Units at 04/25/24 0800    lactobacillus rhamnosus (GG) (CULTURELL) capsule 1 capsule  1 capsule Oral BID Corrina Hurt PA-C   1 capsule at 04/25/24 0801    miconazole (MICATIN) 2 % powder   Topical BID Corrina Hurt PA-C   Given at 04/24/24 2003    multivitamin RENAL (TRIPHROCAPS) capsule 1 capsule  1 capsule Oral Daily Corrina Hurt PA-C   1 capsule at 04/25/24 0758    nicotine (NICODERM CQ) 14 MG/24HR 24 hr patch 1 patch  1 patch Transdermal Daily Corrina Hurt PA-C   1 patch at 04/25/24 0758    nitroGLYcerin (NITROSTAT) sublingual tablet 0.4 mg  0.4 mg Sublingual See Admin Instructions Corrina Hurt PA-C        rosuvastatin (CRESTOR) tablet 10 mg  10 mg Oral At Bedtime Corrina Hurt PA-C   10 mg at 04/24/24 2003    scopolamine (TRANSDERM) 72 hr patch 1 patch  1 patch Transdermal Q72H Corrina Hurt PA-C   1 patch at 04/23/24 1359    And    scopolamine (TRANSDERM-SCOP) Patch in Place   Transdermal Q8H Corrina Hurt PA-C        sodium chloride (PF) 0.9% PF flush 9 mL  9 mL Intracatheter During Dialysis/CRRT (from stock) Tamica Garcia MD        sodium chloride (PF) 0.9% PF flush 9 mL  9 mL Intracatheter During Dialysis/CRRT (from stock) Tamica Garcia MD         Current Facility-Administered Medications   Medication Dose Route Frequency Provider Last Rate Last Admin     Tamica Garcia MD

## 2024-04-25 NOTE — PROGRESS NOTES
"  [facility]   Acute Rehabilitation Unit  Daily progress note    INTERVAL HISTORY  Shirley Hendricks was seen at bedside this morning.  No acute events reported overnight.  She reports to be feeling \"exhausted\" after her first therapy session, feels the therapists are \"working her to death\".  She is noting increased pain in her residual limb, which she attributes to the increased activity level.  She is also having more phantom sensations/pain.  She asks about getting her gabapentin back, reviewed that she is taking at renal dosing.  She is also noting more itching around her surgical site, as well as more drainage.  She reports to have slept well last night.  She notes mild intermittent shortness of breath and dizziness.  She endorses abdominal pain that is muscular but not otherwise.  She denies any nausea or vomiting (though then nursing reported episode of emesis shortly after).  She denies loose stools yesterday, though some episodes are charted.  She feels she typically has several hours of loose stools following her dialysis.  Denies other questions or concerns at this time.    With therapies, she is currently needing liko lift with Ax2 for transfers, wheelchair based for mobility, set-up for seated grooming and upper body dressing, max A for lower body dressing.  Working with nursing and therapy teams to create set schedule for HD and therapies to optimize participation.    MEDICATIONS  Current Facility-Administered Medications   Medication Dose Route Frequency Provider Last Rate Last Admin    amLODIPine (NORVASC) tablet 5 mg  5 mg Oral Daily Corrina Hurt PA-C   5 mg at 04/24/24 0813    carvedilol (COREG) tablet 6.25 mg  6.25 mg Oral BID w/meals Corrina Hurt PA-C   6.25 mg at 04/24/24 1650    clopidogrel (PLAVIX) tablet 75 mg  75 mg Oral Daily Corrina Hurt PA-C   75 mg at 04/24/24 0813    famotidine (PEPCID) tablet 20 mg  20 mg Oral Q48H Blu, " Corrina PAIZ PA-C   20 mg at 04/24/24 1650    fluticasone-vilanterol (BREO ELLIPTA) 200-25 MCG/ACT inhaler 1 puff  1 puff Inhalation Daily Corrina Hurt PA-C   1 puff at 04/24/24 0815    gabapentin (NEURONTIN) capsule 100 mg  100 mg Oral Once per day on Tuesday Thursday Saturday Corrina Hurt PA-C   100 mg at 04/23/24 2100    heparin ANTICOAGULANT injection 5,000 Units  5,000 Units Subcutaneous Q12H Corrina Hurt PA-C   5,000 Units at 04/24/24 2003    lactobacillus rhamnosus (GG) (CULTURELL) capsule 1 capsule  1 capsule Oral BID Corrina Hurt PA-C   1 capsule at 04/24/24 2003    miconazole (MICATIN) 2 % powder   Topical BID Corrina Hurt PA-C   Given at 04/24/24 2003    multivitamin RENAL (TRIPHROCAPS) capsule 1 capsule  1 capsule Oral Daily Corrina Hurt PA-C   1 capsule at 04/24/24 0813    nicotine (NICODERM CQ) 14 MG/24HR 24 hr patch 1 patch  1 patch Transdermal Daily Corrina Hurt PA-C   1 patch at 04/24/24 0813    nitroGLYcerin (NITROSTAT) sublingual tablet 0.4 mg  0.4 mg Sublingual See Admin Instructions Corrina Hurt PA-C        rosuvastatin (CRESTOR) tablet 10 mg  10 mg Oral At Bedtime Corrina Hurt PA-C   10 mg at 04/24/24 2003    scopolamine (TRANSDERM) 72 hr patch 1 patch  1 patch Transdermal Q72H Corrina Hurt PA-C   1 patch at 04/23/24 1359    And    scopolamine (TRANSDERM-SCOP) Patch in Place   Transdermal Q8H Corrina Hurt PA-C              Current Facility-Administered Medications   Medication Dose Route Frequency Provider Last Rate Last Admin    acetaminophen (TYLENOL) tablet 500-1,000 mg  500-1,000 mg Oral Q6H PRN Corrina Hurt PA-C   1,000 mg at 04/24/24 1650    diphenoxylate-atropine (LOMOTIL) 2.5-0.025 MG per tablet 1 tablet  1 tablet Oral 4x Daily PRN Corrina Hurt PA-C   1 tablet at 04/24/24 1955    HYDROmorphone (DILAUDID) tablet 2 mg  2 mg Oral Q6H PRN Corrina Hurt PA-C   2 mg at 04/24/24 2507  "   naloxone (NARCAN) injection 0.2 mg  0.2 mg Intravenous Q2 Min PRN Arun Pimentel MD        Or    naloxone (NARCAN) injection 0.4 mg  0.4 mg Intravenous Q2 Min PRN Arun Pimentel MD        Or    naloxone (NARCAN) injection 0.2 mg  0.2 mg Intramuscular Q2 Min PRN Arun Pimentel MD        Or    naloxone (NARCAN) injection 0.4 mg  0.4 mg Intramuscular Q2 Min PRN Arun Pimentel MD        ondansetron (ZOFRAN ODT) ODT tab 4 mg  4 mg Oral Q6H PRN Corrina Hurt PA-C        polyethylene glycol (MIRALAX) powder 17 g  17 g Oral Daily PRN Corrina Hurt PA-C        senna-docusate (SENOKOT-S/PERICOLACE) 8.6-50 MG per tablet 1-2 tablet  1-2 tablet Oral BID PRN Corrina Hurt PA-C            PHYSICAL EXAM  /58 (BP Location: Left arm)   Pulse 65   Temp 98.4  F (36.9  C) (Oral)   Resp 18   Ht 1.549 m (5' 0.98\")   Wt 55.8 kg (123 lb 0.3 oz)   LMP  (LMP Unknown)   SpO2 96%   BMI 23.26 kg/m    Gen: NAD, lying in bed  HEENT: NC/AT, MMM  Cardio: RRR, no murmurs, dialysis catheter R chest  Pulm: non-labored on room air, lungs CTA bilaterally  Abd: soft, non-tender, non-distended, bowel sounds present  Ext: some edema in RLE; charcot foot deformity on left; R surgical site as pictured below.  Mild/stable erythema, no warmth.  Moderate serosanguinous drainage on dressing from lateral portion which is dehisced.  Eschar vs necrotic tissue at anterior thigh and medial portion of incision.   Neuro/MSK: awake, alert, moving all extremities actively in bed    Lateral        Medial    Anterolateral thigh      Dressing, less than 24 hours since last change    LABS  CBC RESULTS:   Recent Labs   Lab Test 04/25/24  0540 04/20/24  1821 04/20/24  0645 04/18/24  0604 04/17/24  0636 04/16/24  0642   WBC 4.4  --   --   --  8.5 8.7   RBC 2.95*  --   --   --  2.85* 3.13*   HGB 9.0* 8.6* 7.4*   < > 8.5* 9.2*   HCT 27.7*  --   --   --  25.9* 28.3*   MCV 94  --   --   --  91 90   MCH 30.5  --   --   --  29.8 29.4   MCHC 32.5  --   --   -- "  32.8 32.5   RDW 18.0*  --   --   --  16.9* 16.3*     --  170  --  225 227    < > = values in this interval not displayed.       Last Basic Metabolic Panel:  Recent Labs   Lab Test 04/25/24  0540 04/23/24  0602 04/22/24  0553 04/19/24  2132 04/19/24  0550 04/17/24  2200 04/17/24  0636   * 124*  --   --   --   --  131*   POTASSIUM 4.3 4.3 4.2  --  4.4   < > 3.9   CHLORIDE 95* 92*  --   --   --   --  95*   CO2 24 22  --   --   --   --  22   ANIONGAP 7 10  --   --   --   --  14   GLC 86 74  --  81  --    < > 82   BUN 36.9* 52.7*  --   --   --   --  41.2*   CR 2.76* 2.83*  --   --  2.65*  --  2.01*   GFRESTIMATED 18* 18*  --   --  19*  --  27*   SONIA 7.7* 7.4*  --   --   --   --  7.7*    < > = values in this interval not displayed.         Rehabilitation - continue comprehensive acute inpatient rehabilitation program with multidisciplinary approach including therapies, rehab nursing, and physiatry following. See interval history for updates.      ASSESSMENT AND PLAN  Shirley Hendricks is a 63 year old right hand dominant female with complicated past medical history including but not limited to PAD with multiple prior bilateral lower extremity vascular bypass grafts and thrombectomies (most recently 10/2023 right common femoral to proximal anterior tibial PTFE bypass graft), bilateral Charcot-Breonna-Tooth foot deformity, prior bilateral carotid endarterectomies, B-cell lymphoma, CAD (hx Mix3), hypertension, hyperlipidemia, GI bleed (12/2023), type 2 diabetes mellitus, COPD, tobacco use disorder, iron deficiency anemia, GERD, Celiac disease, Takotsubo cardiomyopathy, SVT, depression/anxiety and spongiotic dermatitis who was admitted on 3/29/24 with acute occlusion of right lower extremity arterial bypass graft now s/p right above-knee amputation 4/1/24, completed 4/9/24 with hospital course complicated by acute renal failure now on dialysis, sepsis, acute blood loss anemia, acute post-operative pain, nausea,  loose stools, delirium, malnutrition, and multiple electrolyte derangements.  She is now admitted to ARU on 4/22/24 for multidisciplinary rehabilitation and ongoing medical management.        Admission to acute inpatient rehab 04/22/24.    Impairment group code: Amputation 05.3 Unilateral LE AKA; emergent R AKA due to acute occlusion of RLE bypass graft         PT and OT 90 minutes of each daily for 6 days per week in addition to rehab nursing and close management of physiatrist.       Impairment of ADL's: Noted to have impaired activity tolerance, impaired balance, impaired strength, impaired weight shifting, and pain, all affecting her ability to safely and independently perform basic ADLs.  Goal for mod I with basic wheelchair-based ADLs with assist for bathing, as well as assist IADLs/heavier activities.     Impairment of mobility:  Noted to have impaired activity tolerance, impaired balance, impaired strength, impaired weight shifting, and pain, all affecting her ability to safely and independently perform basic mobility.  Goal for mod I with basic wheelchair-based mobility.     Impairment of cognition/language/swallow:  Noted to have dysphagia with goals for safe tolerance of least restrictive diet.  However, IP SLP noting did not anticipate further SLP services at ARU, no issues with tolerating regular diet.  Will not consult initially.  If any concern for difficulty tolerating current diet, can consult.     Medical Conditions  New actions/orders/updates for today are in blue.     S/p right AKA 4/1/24, completed 4/9/24 due to critical limb ischemia, acute occlusion of right common femoral artery to mid anterior tibial artery bypass graft   PAD/PVD with hx multiple prior vascular interventions to BLE  Bilateral Charcot-Breonna-Tooth foot deformities  Acute post-op pain  - Wound care: daily dressing changes to right AKA with xeroform and gauze with kerlix to keep wound protected  - Dehiscence of lateral surgical  incision with some increased serosanguinous drainage.  Also with multiple areas with necrotic appearance.  Overall similar to day of ARU admission although with more drainage.  Updated photos in chart and requested vascular surgery to review.  - Also with significant itching at surgical site.  Recently seen by derm for dermatitis as below and recommended to trial zyrtec or claritin for itching.  Will start zyrtec 5 mg daily.  - NWB RLE  - Continue PTA L foot custom orthotic   - Continue Plavix 75 mg daily (given hx left bypass).  No ongoing need for Xarelto per vascular (no recent DVT or PE)  - Pain management: APAP 500-1000 mg q6h PRN, gabapentin 100 mg 3x/week after HD (renally dosed), dilaudid 2 mg q6h PRN.  Wean opioids as able.  Is still complain of phantom pain/sensations.  Could consider slight increase in gabapentin if persists and not overly sedated.  Recommended max of 200-300 mg post-HD; did have some sedation on 100 mg TID prior to start of HD.  - Continue PT/OT  - Follow up with vascular surgery in 2-4 weeks     Acute blood loss anemia on anemia of chronic disease, hx iron deficiency  Retroperitoneal hematoma  Recent GI bleed (hospitalized 12/2023)  Required intermittent transfusion during this admission (3/30, 3/31, 4/2, 4/6, 4/9, 4/13).  Repeat CT abdomen on 4/20 with stable right retroperitoneal hematoma; no s/o active bleed.  4/25: Hgb stable at 9.0  - Continue epo/venofer with HD per nephrology  - Trend CBC every T/Th/Sat  - Transfuse for Hgb <7     Acute renal failure on HD  Hyponatremia  Normal baseline Cr, though per nephrology, suspect some modest CKD.  LIMA this admission, up to peak Cr 4/22 on 4/3.  Likely BAILEY +/- rhabdo +/- ischemic injury with no signs of recovery and now dialysis dependent (started 4/3/24).  S/p tunneled dialysis catheter placement 4/3/24.  4/25: Cr 2.76; Na remains low at 126.  HD today.  Will await any further recs/changes per nephrology.  - Nephrology consulted,  appreciate ongoing assistance  - HD T/Th/Sat at ARU or per nephrology recs.  Plan for OP HD at [facility] T/Th/Sat  - Trend BMP/mag/phos on HD days  - Continue 1L fluid restriction  - Avoid NSAIDs/nephrotoxins, renally dose medications     Bilateral pleural effusion  Noted on CT abdomen 4/20 with moderate b/l pleural effusion (left>right).  Has been intermittently requiring 1-2L supplemental oxygen.  Unclear if related to volume given new renal failure on HD, also baseline COPD.  - Monitor respiratory status, supplemental O2 by NC PRN to maintain sats >88%  - Consider thoracentesis if symptomatic or worsening hypoxia      Celiac disease  Colon distension   Loose stools, improving  Nausea/vomiting, improving  Severe malnutrition in context of acute on chronic illness  Pill cam study with [facility] in 3/2024 (due to recent GI bleed), which revealed mild non-erosive red tissue in the duodenum, some atrophic type appearance that could be celiac disease.  This was followed by celiac labs on 3/20/24 (Gliadin ab and tTG IgA antibody) which were positive and thus Celiac diagnosis was established and she was recommended for gluten-free diet.  This admission, noted to have colonic distension and circumferential wall thickening of the distal colon and rectum concerning for proctocolitis on CT abdomen.  Also with loose stools, nausea.  Despite this, patient declined gluten-free diet (switched on 4/18).  Noted to have improvement in loose stools and nausea at discharge to ARU per sending team.  - Per discussion with RD, given K/phos are WNL and patient feeling restricted by renal diet, liberalized to 3g Na diet.  Will also continue to educate/encourage gluten-free diet, which patient may be willing to consider if otherwise less restricted.  RD discussed recommendations and rationale for gluten-free diet again on 4/24 but patient adamantly refused.  - RD consulted, appreciate assistance  - Continue scopolamine patch for now  (started 4/18), wean as able  - PRN Zofran  - Continue probiotics BID  - Continue lomotil QID PRN  - Monitor symptoms  - Follow up with [facility] as outpatient    Hypomagnesemia  Replaced mag IV on 4/23 with improvement to 2.1  4/25: Mag WNL at 2.3  - Continue to trend     Hypocalcemia  4/23: Ca corrects to 8.7 for hypoalbuminemia  - Per pharmacy, was on PTA calcium carbonate/vit D PTA (had run out 1 week before admission), not ordered while at hospital.  If corrected Ca remains low, reach out to nephrology to consider if resumption indicated  - Continue to trend    CAD (hx MI x3)  HTN  Hyperlipidemia  Hx Takotsubo CM  Hx SVT  Last coronary angiogram completed 10/2023.  Recovered EF (55-60%) from ECHO 11/2023.  - Continue PTA Coreg 6.25 mg BID, amlodipine 5 mg daily  - Hold PTA lisinopril 10 mg daily due to renal failure  - PTA Jardiance 10 mg daily discontinued due to renal failure  - Rosuvastatin 10 mg daily resumed on ARU admission (previously held due to concern for rhabdo).  Monitor hepatic panel in 1 week    - Monitor BP     Hx bilateral carotid artery stenosis s/p bilateral carotid endarterectomies  - Follow up with vascular surgery for annual carotid duplex (last done on 1/4/24 with stable stenosis of right carotid bulb)     Diabetes mellitus, type II  Per chart review.  A1c this admission 5.2%.  PTA Jardiance and gabapentin discontinued due to acute renal failure.  BG well-controlled during this admission without need for insulin.  - Monitor BG periodically with labs     B-cell lymphoma, stage VALENTIN  Followed by [facility] (Dr. Barrow).  Mild radiographic progression on CT 1/26/24.  Given asymptomatic, plans at that time for ongoing CT monitoring.  - Monitor for development of any B symptoms  - Trend CBC  - Follow up with oncology, repeat CT as planned in 7/2024     COPD  Tobacco use disorder  PTA smoking ~5 cigarettes per day  - Monitor respiratory status, supplemental O2 by NC PRN to maintain sats >88%  -  Continue PTA Breo Ellipta  - Nicotine patch 14 mcg/day  - Ongoing education/resources for cessation     Adjustment disorder with mixed mood  Hx MDD/anxiety (per chart though patient denies)  Delirium, resolved  Not on medications PTA.  Patient denies any hx depression/anxiety but does note depressed mood in setting of recent AKA and significant home stressors.  - Psychology and spiritual services consulted, appreciate assist  - Monitor mood     GERD  PTA on omeprazole 20 mg daily  - Continue pepcid 20 mg q48 hours (renally dosed) given allergy to pantoprazole (formulary sub for omeprazole)     Spongiotic dermatitis  Recently seen by OP dermatology, recommended betamethasone cream BID PRN, not using regularly at hospital  - Consider resumption of topical steroids if recurrent symptoms  - Started Zyrtec as above for itching near surgical site     Sacral wound: pressure injury (stage 2 vs 3), incontinence-associated dermatitis, moisture-associated skin damage  Assessed by WOCN on 4/22 at [facility] hospital.  - WOCN consulted for ongoing follow-up at ARU  - Wound care per WOCN orders  - Pressure reduction strategies     Adjustment to disability:  Clinical psychology to eval and treat if indicated  FEN: 3g Na diet, 1000 mL fluid restriction  Bowel: incontinent, recent loose stools as above  Bladder: incontinent, monitor PVRs at admission  DVT Prophylaxis: subcutaneous heparin  GI Prophylaxis: pepcid  Code: full, confirmed on admission  Disposition: goal for home  ELOS:  target 5/13/24  Follow up Appointments on Discharge: PCP in 1-2 weeks, vascular surgery in 2 weeks, [facility], nephrology (ongoing HD), heme/onc ([facility], 7/2024)      40 minutes spent on the date of service doing chart review, history and exam, documentation, and further activities as noted above.       Plan of care was discussed with Dr. Arun Pimentel, PM&R Staff Physician    Corrina Hurt PA-C  Physical Medicine & Rehabilitation

## 2024-04-25 NOTE — PLAN OF CARE
Discharge Planner Post-Acute Rehab OT:     Discharge Plan: home with HH OT     Precautions: fall, NWB of RLE, dialysis, sacral wound, elevate residual limb when in bed    Current Status:  ADLs:  Mobility: WC based, liko lift Ax2  Grooming: set up seated  Dressing: UB: set up, LB: max A  Bathing: TBD  Toileting: liko lift to commode  IADLs: Pt does most IADL including caregiving for .   Vision/Cognition: intact    Assessment: Focus on problem solving LB cares and building confidence and strength for slide board transfers. Pt's mood impacts her desire to try new strategies.   Second session we discussed set schedule for dialysis and non dialysis days. Will start implementing tomorrow. Pt felt sick at end of session and had emesis. Nurse aware.    Other Barriers to Discharge (DME, Family Training, etc): DME, family training, pt has limited support despite living with multiple family members.

## 2024-04-25 NOTE — PLAN OF CARE
Therapy set schedule:     Dialysis days:  8:15am-9:15 60 min OT  9:15-10:15 60 min PT  10:30-11:30 60 min OT    Non dialysis days:  8:15 60 min OT  10:00am 60 min PT  1:15pm 30-45 min OT  2:30pm 30-45 min PT
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
## 2024-04-25 NOTE — PLAN OF CARE
Goal Outcome Evaluation: Alert and oriented x4. Able to make needs known. Right AKA.  A2 lift with transfers. On a 3g sodium diet, thin liquids and able to take her pills whole. On a 1 L fluid restrictions. Incontinent of bowel and bladder. LBM 4/24. Pt had x1 emesis and PRN Zofran administered which was effective. Pt currently in dialysis.

## 2024-04-25 NOTE — PLAN OF CARE
Discharge Plan: home with HH PT      Precautions: fall, NWB of RLE, dialysis, sacral wound, elevate residual limb when in bed    Current Status:  Bed Mobility: not tested  Transfer: Princeo Ax2 w/ nsg  Gait: unable, unsafe  Stairs: not tested  Balance: not tested    Assessment: Focus on transfers and endurance today, max encouragement required throughout session for participation.     Other Barriers to Discharge (DME, Family Training, etc):   DME: K3 w/c, likely slideboard     Need to confirm family support.

## 2024-04-26 ENCOUNTER — APPOINTMENT (OUTPATIENT)
Dept: OCCUPATIONAL THERAPY | Facility: CLINIC | Age: 64
DRG: 559 | End: 2024-04-26
Attending: PHYSICAL MEDICINE & REHABILITATION
Payer: COMMERCIAL

## 2024-04-26 ENCOUNTER — APPOINTMENT (OUTPATIENT)
Dept: PHYSICAL THERAPY | Facility: CLINIC | Age: 64
DRG: 559 | End: 2024-04-26
Attending: PHYSICAL MEDICINE & REHABILITATION
Payer: COMMERCIAL

## 2024-04-26 LAB — GLUCOSE BLDC GLUCOMTR-MCNC: 87 MG/DL (ref 70–99)

## 2024-04-26 PROCEDURE — 250N000013 HC RX MED GY IP 250 OP 250 PS 637: Performed by: PHYSICIAN ASSISTANT

## 2024-04-26 PROCEDURE — 97110 THERAPEUTIC EXERCISES: CPT | Mod: GP

## 2024-04-26 PROCEDURE — 97530 THERAPEUTIC ACTIVITIES: CPT | Mod: GO

## 2024-04-26 PROCEDURE — 128N000003 HC R&B REHAB

## 2024-04-26 PROCEDURE — 97535 SELF CARE MNGMENT TRAINING: CPT | Mod: GO | Performed by: STUDENT IN AN ORGANIZED HEALTH CARE EDUCATION/TRAINING PROGRAM

## 2024-04-26 PROCEDURE — G0463 HOSPITAL OUTPT CLINIC VISIT: HCPCS

## 2024-04-26 PROCEDURE — 99232 SBSQ HOSP IP/OBS MODERATE 35: CPT | Performed by: PHYSICIAN ASSISTANT

## 2024-04-26 PROCEDURE — 97530 THERAPEUTIC ACTIVITIES: CPT | Mod: GP

## 2024-04-26 PROCEDURE — 99233 SBSQ HOSP IP/OBS HIGH 50: CPT | Mod: 24 | Performed by: INTERNAL MEDICINE

## 2024-04-26 PROCEDURE — 250N000011 HC RX IP 250 OP 636: Performed by: PHYSICIAN ASSISTANT

## 2024-04-26 PROCEDURE — 250N000009 HC RX 250: Performed by: PHYSICIAN ASSISTANT

## 2024-04-26 PROCEDURE — 97535 SELF CARE MNGMENT TRAINING: CPT | Mod: GO

## 2024-04-26 RX ORDER — GABAPENTIN 100 MG/1
200 CAPSULE ORAL
Status: DISCONTINUED | OUTPATIENT
Start: 2024-04-27 | End: 2024-05-15 | Stop reason: HOSPADM

## 2024-04-26 RX ORDER — NITROGLYCERIN 0.4 MG/1
0.4 TABLET SUBLINGUAL EVERY 5 MIN PRN
Status: DISCONTINUED | OUTPATIENT
Start: 2024-04-26 | End: 2024-05-15 | Stop reason: HOSPADM

## 2024-04-26 RX ADMIN — ROSUVASTATIN CALCIUM 10 MG: 5 TABLET, COATED ORAL at 21:00

## 2024-04-26 RX ADMIN — HYDROMORPHONE HYDROCHLORIDE 2 MG: 2 TABLET ORAL at 17:22

## 2024-04-26 RX ADMIN — AMLODIPINE BESYLATE 5 MG: 5 TABLET ORAL at 09:31

## 2024-04-26 RX ADMIN — HEPARIN SODIUM 5000 UNITS: 5000 INJECTION, SOLUTION INTRAVENOUS; SUBCUTANEOUS at 09:31

## 2024-04-26 RX ADMIN — FAMOTIDINE 20 MG: 20 TABLET ORAL at 16:24

## 2024-04-26 RX ADMIN — FLUTICASONE FUROATE AND VILANTEROL TRIFENATATE 1 PUFF: 200; 25 POWDER RESPIRATORY (INHALATION) at 09:32

## 2024-04-26 RX ADMIN — NICOTINE 1 PATCH: 14 PATCH, EXTENDED RELEASE TRANSDERMAL at 09:30

## 2024-04-26 RX ADMIN — Medication 1 CAPSULE: at 09:31

## 2024-04-26 RX ADMIN — CARVEDILOL 6.25 MG: 6.25 TABLET, FILM COATED ORAL at 17:22

## 2024-04-26 RX ADMIN — HEPARIN SODIUM 5000 UNITS: 5000 INJECTION, SOLUTION INTRAVENOUS; SUBCUTANEOUS at 21:00

## 2024-04-26 RX ADMIN — Medication 1 CAPSULE: at 21:00

## 2024-04-26 RX ADMIN — CLOPIDOGREL BISULFATE 75 MG: 75 TABLET ORAL at 09:31

## 2024-04-26 RX ADMIN — CARVEDILOL 6.25 MG: 6.25 TABLET, FILM COATED ORAL at 09:31

## 2024-04-26 RX ADMIN — CETIRIZINE HYDROCHLORIDE 5 MG: 5 TABLET ORAL at 09:31

## 2024-04-26 RX ADMIN — HYDROMORPHONE HYDROCHLORIDE 2 MG: 2 TABLET ORAL at 11:03

## 2024-04-26 RX ADMIN — ONDANSETRON 4 MG: 4 TABLET, ORALLY DISINTEGRATING ORAL at 16:24

## 2024-04-26 RX ADMIN — SCOPALAMINE 1 PATCH: 1 PATCH, EXTENDED RELEASE TRANSDERMAL at 12:15

## 2024-04-26 ASSESSMENT — ACTIVITIES OF DAILY LIVING (ADL)
ADLS_ACUITY_SCORE: 37
ADLS_ACUITY_SCORE: 38
ADLS_ACUITY_SCORE: 38
ADLS_ACUITY_SCORE: 37
ADLS_ACUITY_SCORE: 39
ADLS_ACUITY_SCORE: 37
ADLS_ACUITY_SCORE: 39
ADLS_ACUITY_SCORE: 37
ADLS_ACUITY_SCORE: 38
ADLS_ACUITY_SCORE: 38
ADLS_ACUITY_SCORE: 37
ADLS_ACUITY_SCORE: 38
ADLS_ACUITY_SCORE: 37
ADLS_ACUITY_SCORE: 37

## 2024-04-26 NOTE — PROGRESS NOTES
Nephrology Progress Note  04/26/2024         Assessment & Recommendations:   Shirley Hendricks is a 63 year old year old with peripheral artery disease, coronary artery disease and history of MI, hypertension, diabetes mellitus type 2 who is admitted to ARU following hospitalization for occlusion of right LE bypass graft managed with right above-the-knee amputation and complication of acute kidney injury requiring dialysis.     #Acute kidney injury: Felt to be due to contrast nephropathy as she received contrast 3 days in a row for evaluation and management of her occluded bypass graft.  There is also potentially some component of rhabdomyolysis.  -First dialysis run 4/3/2024.  -Access tunneled CVC initially placed 4/3/2024 and revised on 4/15/2024.  -Suspicion of underlying diabetic nephropathy and/or renovascular disease.  - continue dialysis Tuesday, Thursday, Saturday  - has been receiving hemodialysis at [facility] and consents to ongoing hemodialysis while at [facility].    -Dialysis tomorrow.    Note pleural effusion on imaging and so we will challenge volume status as able  -continue to monitor for renal recovery. Please order creatinine for Tuesday 4/30 before HD       #Hyponatremia: Sodium yesterday before HD was 126.  This is due to fluid intake in the absence of renal function.    - 1 L daily fluid restriction     #PAD/PVD  #Right common femoral artery to the mid anterior tibial artery bypass graft acute occlusion  #Right above-the-knee amputation 4/9/2024  - Plavix 75 mg daily (previously on Xarelto but no ongoing indication)     #Anemia due to acute blood loss with retroperitoneal hematoma and history of GI bleeding in December 2023.  - Has required transfusion with PRBCs.  -Has been receiving EPO 10K and Venofer with hemodialysis  -Will order hemoglobin for next dialysis treatment.  - retacrit 10K units three times weekly with HD     #Coronary artery disease with history of myocardial infarction  "x 3  #History of Takotsubo cardiomyopathy  PTA carvedilol and lisinopril, lisinopril has been on hold.  PTA rosuvastatin was initially held due to rhabdo but resumed.  PTA empagliflozin 10 mg daily has been discontinued given acute kidney injury     #Diabetes mellitus type 2  - Not requiring medications at this time     #Stage Mark marginal zone B-cell lymphoma  - Undergoing surveillance with [place] [facility]     Recommendations were communicated to primary team via note    Tamica Garcia MD   Division of Renal Disease and Hypertension  [employer]  Vocera Web Console    Interval History :   Nursing and provider notes from last 24 hours reviewed.  In the last 24 hours Shirley had dialysis with nausea and vomiting during dialysis treatment. I had ordered 4 hour run and asked nurses to UF 3-4L - unclear if BP drop triggered symptoms. She did not have abdominal pain. After dialysis, she has a lot of liquid stool, no blood in stool, no pain. Today feels better. Continues to have edema of left leg and foot. Sisters visiting today.    Review of Systems:   I reviewed the following systems:  GI: stable appetite. no nausea or vomiting, + diarrhea.   Neuro:  no confusion  Constitutional:  no fever or chills  CV: no dyspnea or edema.  no chest pain.    Physical Exam:   I/O last 3 completed shifts:  In: 738 [P.O.:738]  Out: 2100 [Other:2100]   BP (!) 162/65 (BP Location: Left arm, Patient Position: Supine, Cuff Size: Adult Regular)   Pulse 68   Temp 99.4  F (37.4  C) (Oral)   Resp 16   Ht 1.549 m (5' 0.98\")   Wt 56.1 kg (123 lb 10.9 oz)   LMP  (LMP Unknown)   SpO2 97%   BMI 23.38 kg/m       GENERAL APPEARANCE: no distress  EYES:  no scleral icterus, pupils equal  Mouth: dry mucosa  PULM: normal work of breathing  CV: 2+ left foot edema  INTEGUMENT: no cyanosis, no rash  NEURO:  speech fluent, face symmetric    Labs:   All labs reviewed by me  Electrolytes/Renal -   Recent Labs   Lab Test 04/26/24  0210 04/25/24  0540 " 04/23/24 2136 04/23/24 0602 04/22/24  0553 04/19/24 2132 04/19/24  0550 04/17/24 2200 04/17/24 0636   NA  --  126*  --  124*  --   --   --   --  131*   POTASSIUM  --  4.3  --  4.3 4.2  --  4.4   < > 3.9   CHLORIDE  --  95*  --  92*  --   --   --   --  95*   CO2  --  24  --  22  --   --   --   --  22   BUN  --  36.9*  --  52.7*  --   --   --   --  41.2*   CR  --  2.76*  --  2.83*  --   --  2.65*  --  2.01*   GLC 87 86  --  74  --    < >  --    < > 82   SONIA  --  7.7*  --  7.4*  --   --   --   --  7.7*   MAG  --  2.3 2.1 1.5* 1.6*  --  1.7   < > 1.6*   PHOS  --  2.8  --  4.3 3.8  --  3.7   < > 3.0    < > = values in this interval not displayed.       CBC -   Recent Labs   Lab Test 04/25/24  0540 04/20/24  1821 04/20/24  0645 04/18/24  0604 04/17/24  0636 04/16/24  0642   WBC 4.4  --   --   --  8.5 8.7   HGB 9.0* 8.6* 7.4*   < > 8.5* 9.2*     --  170  --  225 227    < > = values in this interval not displayed.       LFTs -   Recent Labs   Lab Test 04/23/24  0602 04/17/24  0636 04/16/24  0642 04/15/24  0536 04/09/24  0636 04/04/24  0541   ALKPHOS 96  --   --   --  155* 192*   BILITOTAL 0.5  --   --   --  0.9 0.6   ALT 20  --   --   --  53* 326*   AST 16  --   --   --  68* 301*   PROTTOTAL 4.1*  --   --   --  3.9* 4.2*   ALBUMIN 1.9* 2.3* 2.1*   < > 1.8* 2.1*    < > = values in this interval not displayed.       Iron Panel -   Recent Labs   Lab Test 04/10/24  0559 04/09/24  1817 01/08/24  1542 12/11/23  0550   IRON 22*  --  71 122   IRONSAT 18  --  17 32   BRYN  --  609* 25 23       Current Medications:  Current Facility-Administered Medications   Medication Dose Route Frequency Provider Last Rate Last Admin    amLODIPine (NORVASC) tablet 5 mg  5 mg Oral Daily Corrina Hurt PA-C   5 mg at 04/26/24 0931    carvedilol (COREG) tablet 6.25 mg  6.25 mg Oral BID w/meals Corrina Hurt PA-C   6.25 mg at 04/26/24 0931    cetirizine (zyrTEC) tablet 5 mg  5 mg Oral Daily Corrina Hurt PA-C   5 mg at  04/26/24 0931    clopidogrel (PLAVIX) tablet 75 mg  75 mg Oral Daily Corrina Hurt PA-C   75 mg at 04/26/24 0931    famotidine (PEPCID) tablet 20 mg  20 mg Oral Q48H Corrina Hurt PA-C   20 mg at 04/26/24 1624    fluticasone-vilanterol (BREO ELLIPTA) 200-25 MCG/ACT inhaler 1 puff  1 puff Inhalation Daily Corrina Hurt PA-C   1 puff at 04/26/24 0932    [START ON 4/27/2024] gabapentin (NEURONTIN) capsule 200 mg  200 mg Oral Once per day on Tuesday Thursday Saturday Corrina Hurt PA-C        heparin ANTICOAGULANT injection 5,000 Units  5,000 Units Subcutaneous Q12H Corrina Hurt PA-C   5,000 Units at 04/26/24 0931    lactobacillus rhamnosus (GG) (CULTURELL) capsule 1 capsule  1 capsule Oral BID Corrina Hurt PA-C   1 capsule at 04/26/24 0931    miconazole (MICATIN) 2 % powder   Topical BID Corrina Hurt PA-C   Given at 04/25/24 2054    multivitamin RENAL (TRIPHROCAPS) capsule 1 capsule  1 capsule Oral Daily Corrina Hurt PA-C   1 capsule at 04/26/24 0931    nicotine (NICODERM CQ) 14 MG/24HR 24 hr patch 1 patch  1 patch Transdermal Daily Corrina Hurt PA-C   1 patch at 04/26/24 0930    rosuvastatin (CRESTOR) tablet 10 mg  10 mg Oral At Bedtime Corrina Hurt PA-C   10 mg at 04/25/24 1941    scopolamine (TRANSDERM) 72 hr patch 1 patch  1 patch Transdermal Q72H Corrina Hurt PA-C   1 patch at 04/26/24 1215    And    scopolamine (TRANSDERM-SCOP) Patch in Place   Transdermal Q8H Corrina Hurt PA-C        sodium chloride (PF) 0.9% PF flush 9 mL  9 mL Intracatheter During Dialysis/CRRT (from stock) Tamica Garcia MD        sodium chloride (PF) 0.9% PF flush 9 mL  9 mL Intracatheter During Dialysis/CRRT (from stock) Tamica Garcia MD         Current Facility-Administered Medications   Medication Dose Route Frequency Provider Last Rate Last Admin     Tamica Garcia MD

## 2024-04-26 NOTE — PROGRESS NOTES
"HEMODIALYSIS TREATMENT NOTE    Date: 4/25/2024  Time: 7:31 PM    Data:  Pre Wt: 56.1 kg (123 lb 10.9 oz)   Desired Wt: 52.1  kg   Post Wt: 54 kg (119 lb 0.8 oz)  Weight change: 2.1 kg  Ultrafiltration - Post Run Net Total Removed (mL): 2100 mL  Vascular Access Status: CVC  patent  Dialyzer Rinse: Clear  Total Blood Volume Processed: 79.18 L   Total Dialysis (Treatment) Time: 4   Dialysate Bath: K 3, Ca 3  Other: CVC Haeparin locked    Lab:   No    Interventions:  LIMA pt. At about 30mins into HD tx, non stop AP alarm began to sound, New lines was strung effectively. During tx, pt became increasingly nauseous, MARISSA Vegas help administered Zofran IVP, and turned off UF effectively. 2.1 L of fluid net was pulled. Pt rinsed back post tx, lumen were heparin locked, and hand off report was given to MARISSA Oliveira.    Assessment:  -Calm & Cooperative  -Hypertensive but asymptomatic. Writer told PCN to administer due coreg once pt is back under her cares, and she said, \"ok.\"  -A & O X 4     Plan:    Per renal    "

## 2024-04-26 NOTE — PLAN OF CARE
Goal Outcome Evaluation:      Plan of Care Reviewed With: patient    Overall Patient Progress: no change    Outcome Evaluation: Patient back from dialysis approx 1700. Pain managed with current regimen per pt report. no new skin concerns  noted. using call light to make needs known. dressing to R AKA completed on day shift.    Patient experiencing nausea at dialysis with no further complaints. Ate soup for dinner. Patient requesting multiple fluids; verbalized understanding of F.R and remained within range drinking 588 mL. SBP upon return from dialysis 162/69; coreg admin and 131/62. Temp 100.4 upon return; patient had 8 blankets on, 99.0 on reassessment, continue to monitor. Dilaudid and lomotil admin x1 this shift. No brief in bed per WOC orders. CHG completed.     Tylenol admin and effective for headache; denies vision changes, chest pain, SOB. Dilaudid admin for residual limb pain; effective with scheduled meds and repositioning.     Denies dizziness, SOB.

## 2024-04-26 NOTE — CONSULTS
[facility] Nurse Inpatient Assessment     Consulted for: Coccyx    Patient History (according to provider note(s):      Per Corrina Hurt PA-C on 4/22/2024: Shirley Hendricks is a 63 year old right hand dominant female with complicated past medical history including but not limited to PAD with multiple prior bilateral lower extremity vascular bypass grafts and thrombectomies (most recently 10/2023 right common femoral to proximal anterior tibial PTFE bypass graft), bilateral Charcot-Breonna-Tooth foot deformity, prior bilateral carotid endarterectomies, B-cell lymphoma, CAD (hx Mix3), hypertension, hyperlipidemia, GI bleed (12/2023), type 2 diabetes mellitus, COPD, tobacco use disorder, iron deficiency anemia, GERD, Celiac disease, Takotsubo cardiomyopathy, SVT, depression/anxiety and spongiotic dermatitis who was admitted on 3/29/24 with acute occlusion of right lower extremity arterial bypass graft now s/p right above-knee amputation 4/1/24, completed 4/9/24 with hospital course complicated by acute renal failure now on dialysis, sepsis, acute blood loss anemia, acute post-operative pain, nausea, loose stools, delirium, malnutrition, and multiple electrolyte derangements.  She is now admitted to ARU on 4/22/24 for multidisciplinary rehabilitation and ongoing medical management.        Admission to acute inpatient rehab 04/22/24.      Assessment:      Areas visualized during today's visit: Coccyx, buttocks    Pressure Injury Location: Sacroccocygeal       4/22    Last photo: 4/22/24  Wound type: Pressure Injury, Incontinence Associated Dermatitis (IAD), and Moisture Associated Skin Damage (MASD)     Pressure Injury Stage: either Stage 2 or 3 deferring definitive staging until wound base has evolved, hospital acquired from recent admission at [facility].      Wound history/plan of care:  MASD related to incontinence of bladder and bowels. intergluteal crease with area of linear  "erythema, skin is intact. This line of erythema continues up gluteal crease to sacral area and evolves to a localized round area of erythema that is blanchable. To the center of this area is an open pressure related injury stage 2 vs 3, unable to determine staging at this time, area is positional over bone and within skin crevice, very little adipose tissue to this area. Base of wound with dermis to edges and yellow tissue. Patient found with brief on in bed, discussed indication for brief use, she agreed to remove due to \" I don't even want to use them but I can't get up because of my leg\". Patient has Right AKA, discussed use of bedpan and/or transferring to commode if able, she was agreeable to try this plan.     Wound base: 100 % blanchable , non-blanchable, erythema, dermis, and fibrin vs adipose ,        Palpation of the wound bed: normal      Drainage: scant     Description of drainage: serous     Measurements (length x width x depth, in cm) 1.5  x 0.5  x  0.2 cm      Periwound skin: Intact and Erythema- blanchable      Color: normal and consistent with surrounding tissue      Temperature: normal   Odor: none  Pain: denies , none  Pain intervention prior to dressing change: patient tolerated well, no significant pain present , soaking, and slow and gentle cares   Treatment goal: Heal , Infection control/prevention, Maintain (prevention of deterioration), Protection, and Promote epidermal migration  STATUS: initial assessment  Supplies ordered: at bedside, supplies stored on unit, discussed with RN, and discussed with patient     My PI Risk Assessment     Sensory Perception: 4 - No impairment     Moisture: 3 - Occasionally moist      Activity: 2 - Chairfast     Mobility: 3 - Slightly limited      Nutrition: 3 - Adequate     Friction/Shear: 1 - Problem     TOTAL: 16      Treatment Plan:     Sacral wound(s): BID and PRN for episodes of incontinence   Cleanse the area with Neno cleanse and protect, very gently " "with soft cloth.  Apply ostomy powder (#6782) on all open and denuded skin.  Apply thin layer of critic aid paste on top of it.  With repeat application, do not scrub the paste, only remove soiled paste and reapply.  If complete removal of paste is necessary use baby oil/mineral oil (#474536) and soft wash cloth.  Ensure pt has Ritchie-cushion (#495519) while sitting up in the chair.  Use only one Covidien pad in between mattress and pt.   No brief while in bed.  Add Low air loss pump to isoflex support surface  Strict PIP measures    Pressure Injury Prevention (PIP) Plan:  If patient is declining pressure injury prevention interventions: Explore reason why and address patient's concerns, Educate on pressure injury risk and prevention intervention(s), If patient is still declining, document \"informed refusal\" , and Ensure Care team is aware ( provider, charge nurse, etc)  Mattress: Follow bed algorithm, reassess daily and order specialty mattress, if indicated.  HOB: Maintain at or below 30 degrees, unless contraindicated  Repositioning in bed: Every 1-2 hours , Left/right positioning; avoid supine, Raise foot of bed prior to raising head of bed, to reduce patient sliding down (shear), and Frequent microturns using TAPS wedges, as patient tolerates  Heels: Keep elevated off mattress and Pillows under calves  Protective Dressing: Sacral Mepilex for prevention (#083237),  especially for the agitated patient   Positioning Equipment: TAPS wedges (#147109) to help maintain 30 degree side lying position   Chair positioning: Chair cushion (#329353) , Assist patient to reposition hourly, and Do NOT use a donut for sitting (this increases pressure to smaller area and creates a higher potential for injury)    If patient has a buttock pressure injury, or high risk for PI use chair cushion or SPS.  Moisture Management: Perineal cleansing /protection: Follow Incontinence Protocol, Avoid brief in bed, Clean and dry skin folds with " bathing , Consider InterDry (#944537) between folds, and Moisturize dry skin  Under Devices: Inspect skin under all medical devices during skin inspection , Ensure tubes are stabilized without tension, and Ensure patient is not lying on medical devices or equipment when repositioned  Ask provider to discontinue device when no longer needed.      Orders: Written    RECOMMEND PRIMARY TEAM ORDER: None, at this time  Education provided: importance of repositioning, plan of care, wound progress, Infection prevention , Moisture management, Hygiene, and Off-loading pressure  Discussed plan of care with: Patient and Nurse  WO nurse follow-up plan: twice weekly  Notify WOC if wound(s) deteriorate.  Nursing to notify the Provider(s) and re-consult the WOC Nurse if new skin concern.    DATA:     Current support surface: Standard  Standard gel/foam mattress (IsoFlex, Atmos air, etc)  Containment of urine/stool: Incontinence Protocol, Brief, Incontinent pad in bed, and recommending NICOLE pump be added to isolflex support surface for moisture management  BMI: Body mass index is 23.38 kg/m .   Active diet order: Orders Placed This Encounter      Combination Diet 3 gm NA Diet; Thin Liquids (level 0)     Output: I/O last 3 completed shifts:  In: 738 [P.O.:738]  Out: 2100 [Other:2100]     Labs:   Recent Labs   Lab 04/25/24  0540 04/23/24  0602   ALBUMIN  --  1.9*   HGB 9.0*  --    WBC 4.4  --      Pressure injury risk assessment:   Sensory Perception: 3-->slightly limited  Moisture: 2-->very moist  Activity: 2-->chairfast  Mobility: 2-->very limited  Nutrition: 2-->probably inadequate  Friction and Shear: 2-->potential problem  Cesar Score: 13    Xenia Pelayo RN BSN CWOCN  Pager no longer is use, please contact through [employer] group: [facility] Nurse Tracy Clancyt. Office Number: [phone]

## 2024-04-26 NOTE — PROGRESS NOTES
"  [facility]   Acute Rehabilitation Unit  Daily progress note    INTERVAL HISTORY  Shirley Hendricks was seen at bedside this morning.  No acute events reported overnight.  She reports to have slept \"on and off\" through the night, similar to prior nights.  When asked about pain, she notes that it is quite manageable when she is laying in bed but can be increased with activity.  She is currently more bothered by phantom sensations.  She would like to try slight increase in gabapentin dose as kidney function allows and monitor for increased somnolence.  She denies any nausea, vomiting, or abdominal pain this morning.  She has had one loose incontinent stool so far today.  She denies any shortness of breath or dizziness.  Denies feeling feverish, feels chilled only while in HD.  Denies other questions or concerns at this time and is about to start her PT session.    With therapies, she is currently requiring liko with Ax2 for transfers with nursing, about to try slide board with PT today.  She needs set-up assist for seated grooming/hygiene and dressing supine.    MEDICATIONS  Current Facility-Administered Medications   Medication Dose Route Frequency Provider Last Rate Last Admin    amLODIPine (NORVASC) tablet 5 mg  5 mg Oral Daily Corrina Hurt PA-C   5 mg at 04/24/24 0813    carvedilol (COREG) tablet 6.25 mg  6.25 mg Oral BID w/meals Corrina Hurt PA-C   6.25 mg at 04/25/24 1737    cetirizine (zyrTEC) tablet 5 mg  5 mg Oral Daily Corrina Hurt PA-C        clopidogrel (PLAVIX) tablet 75 mg  75 mg Oral Daily Corrina Hurt PA-C   75 mg at 04/25/24 0758    famotidine (PEPCID) tablet 20 mg  20 mg Oral Q48H Corrina Hurt PA-C   20 mg at 04/24/24 1650    fluticasone-vilanterol (BREO ELLIPTA) 200-25 MCG/ACT inhaler 1 puff  1 puff Inhalation Daily Corrina Hurt PA-C   1 puff at 04/24/24 0815    gabapentin (NEURONTIN) capsule 100 mg  100 mg Oral " Once per day on Tuesday Thursday Saturday Corrina Hurt PA-C   100 mg at 04/25/24 2042    heparin ANTICOAGULANT injection 5,000 Units  5,000 Units Subcutaneous Q12H Corrina Hurt PA-C   5,000 Units at 04/25/24 1941    lactobacillus rhamnosus (GG) (CULTURELL) capsule 1 capsule  1 capsule Oral BID Corrina Hurt PA-C   1 capsule at 04/25/24 1941    miconazole (MICATIN) 2 % powder   Topical BID Corrina Hurt PA-C   Given at 04/25/24 2054    multivitamin RENAL (TRIPHROCAPS) capsule 1 capsule  1 capsule Oral Daily Corrina Hurt PA-C   1 capsule at 04/25/24 0758    nicotine (NICODERM CQ) 14 MG/24HR 24 hr patch 1 patch  1 patch Transdermal Daily Corrina Hurt PA-C   1 patch at 04/25/24 0758    nitroGLYcerin (NITROSTAT) sublingual tablet 0.4 mg  0.4 mg Sublingual See Admin Instructions Corrina Hurt PA-C        rosuvastatin (CRESTOR) tablet 10 mg  10 mg Oral At Bedtime Corrina Hurt PA-C   10 mg at 04/25/24 1941    scopolamine (TRANSDERM) 72 hr patch 1 patch  1 patch Transdermal Q72H Corrina Hurt PA-C   1 patch at 04/23/24 1359    And    scopolamine (TRANSDERM-SCOP) Patch in Place   Transdermal Q8H Corrina Hurt PA-C        sodium chloride (PF) 0.9% PF flush 9 mL  9 mL Intracatheter During Dialysis/CRRT (from stock) Tamica Garcia MD        sodium chloride (PF) 0.9% PF flush 9 mL  9 mL Intracatheter During Dialysis/CRRT (from stock) Tamica Garcia MD              Current Facility-Administered Medications   Medication Dose Route Frequency Provider Last Rate Last Admin    acetaminophen (TYLENOL) tablet 500-1,000 mg  500-1,000 mg Oral Q6H PRN Corrina Hurt PA-C   1,000 mg at 04/25/24 1743    alteplase (CATHFLO ACTIVASE) injection 2 mg  2 mg Intracatheter Q1H PRN Tamica Garcia MD        alteplase (CATHFLO ACTIVASE) injection 2 mg  2 mg Intracatheter Q1H PRN Tamica Garcia MD        diphenoxylate-atropine (LOMOTIL) 2.5-0.025 MG  "per tablet 1 tablet  1 tablet Oral 4x Daily PRN Corrina Hurt PA-C   1 tablet at 04/25/24 1941    HYDROmorphone (DILAUDID) tablet 2 mg  2 mg Oral Q6H PRN Corrina Hurt PA-C   2 mg at 04/25/24 1844    naloxone (NARCAN) injection 0.2 mg  0.2 mg Intravenous Q2 Min PRN Arun Pimentel MD        Or    naloxone (NARCAN) injection 0.4 mg  0.4 mg Intravenous Q2 Min PRN Arun Pimentel MD        Or    naloxone (NARCAN) injection 0.2 mg  0.2 mg Intramuscular Q2 Min PRN Arun Pimentel MD        Or    naloxone (NARCAN) injection 0.4 mg  0.4 mg Intramuscular Q2 Min PRN Arun Pimentel MD        ondansetron (ZOFRAN ODT) ODT tab 4 mg  4 mg Oral Q6H PRN Corrina Hurt PA-C   4 mg at 04/25/24 1041    polyethylene glycol (MIRALAX) powder 17 g  17 g Oral Daily PRN Corrina Hurt PA-C        senna-docusate (SENOKOT-S/PERICOLACE) 8.6-50 MG per tablet 1-2 tablet  1-2 tablet Oral BID PRN Corrina Hurt PA-C        sodium chloride (PF) 0.9% PF flush 10 mL  10 mL Intracatheter Q15 Min PRN Tamica Garcia MD        sodium chloride (PF) 0.9% PF flush 10 mL  10 mL Intracatheter Q15 Min PRN Tamica Garcia MD        sodium chloride 0.9% BOLUS 100-150 mL  100-150 mL Intravenous Q15 Min PRN Tamica Garcia MD            PHYSICAL EXAM  /62 (BP Location: Left arm)   Pulse 72   Temp 99  F (37.2  C) (Oral)   Resp 18   Ht 1.549 m (5' 0.98\")   Wt 56.1 kg (123 lb 10.9 oz)   LMP  (LMP Unknown)   SpO2 94%   BMI 23.38 kg/m    Gen: NAD, up in chair  HEENT: NC/AT, MMM  Cardio: +dialysis catheter R chest  Pulm: non-labored on room air  Abd: soft, non-tender, non-distended  Ext: some edema in RLE; charcot foot deformity on left; R surgical site not visualized on this date but dressing CDI.  Neuro/MSK: awake, alert, moving all extremities actively in bed      LABS  CBC RESULTS:   Recent Labs   Lab Test 04/25/24  0540 04/20/24  1821 04/20/24  0645 04/18/24  0604 04/17/24  0636 04/16/24  0642   WBC 4.4  --   --   --  " 8.5 8.7   RBC 2.95*  --   --   --  2.85* 3.13*   HGB 9.0* 8.6* 7.4*   < > 8.5* 9.2*   HCT 27.7*  --   --   --  25.9* 28.3*   MCV 94  --   --   --  91 90   MCH 30.5  --   --   --  29.8 29.4   MCHC 32.5  --   --   --  32.8 32.5   RDW 18.0*  --   --   --  16.9* 16.3*     --  170  --  225 227    < > = values in this interval not displayed.       Last Basic Metabolic Panel:  Recent Labs   Lab Test 04/26/24  0210 04/25/24  0540 04/23/24  0602 04/22/24  0553 04/19/24  2132 04/19/24  0550 04/17/24  2200 04/17/24  0636   NA  --  126* 124*  --   --   --   --  131*   POTASSIUM  --  4.3 4.3 4.2  --  4.4   < > 3.9   CHLORIDE  --  95* 92*  --   --   --   --  95*   CO2  --  24 22  --   --   --   --  22   ANIONGAP  --  7 10  --   --   --   --  14   GLC 87 86 74  --    < >  --    < > 82   BUN  --  36.9* 52.7*  --   --   --   --  41.2*   CR  --  2.76* 2.83*  --   --  2.65*  --  2.01*   GFRESTIMATED  --  18* 18*  --   --  19*  --  27*   SONIA  --  7.7* 7.4*  --   --   --   --  7.7*    < > = values in this interval not displayed.         Rehabilitation - continue comprehensive acute inpatient rehabilitation program with multidisciplinary approach including therapies, rehab nursing, and physiatry following. See interval history for updates.      ASSESSMENT AND PLAN  Shirley Hendricks is a 63 year old right hand dominant female with complicated past medical history including but not limited to PAD with multiple prior bilateral lower extremity vascular bypass grafts and thrombectomies (most recently 10/2023 right common femoral to proximal anterior tibial PTFE bypass graft), bilateral Charcot-Breonna-Tooth foot deformity, prior bilateral carotid endarterectomies, B-cell lymphoma, CAD (hx Mix3), hypertension, hyperlipidemia, GI bleed (12/2023), type 2 diabetes mellitus, COPD, tobacco use disorder, iron deficiency anemia, GERD, Celiac disease, Takotsubo cardiomyopathy, SVT, depression/anxiety and spongiotic dermatitis who was admitted  on 3/29/24 with acute occlusion of right lower extremity arterial bypass graft now s/p right above-knee amputation 4/1/24, completed 4/9/24 with hospital course complicated by acute renal failure now on dialysis, sepsis, acute blood loss anemia, acute post-operative pain, nausea, loose stools, delirium, malnutrition, and multiple electrolyte derangements.  She is now admitted to ARU on 4/22/24 for multidisciplinary rehabilitation and ongoing medical management.        Admission to acute inpatient rehab 04/22/24.    Impairment group code: Amputation 05.3 Unilateral LE AKA; emergent R AKA due to acute occlusion of RLE bypass graft         PT and OT 90 minutes of each daily for 6 days per week in addition to rehab nursing and close management of physiatrist.       Impairment of ADL's: Noted to have impaired activity tolerance, impaired balance, impaired strength, impaired weight shifting, and pain, all affecting her ability to safely and independently perform basic ADLs.  Goal for mod I with basic wheelchair-based ADLs with assist for bathing, as well as assist IADLs/heavier activities.     Impairment of mobility:  Noted to have impaired activity tolerance, impaired balance, impaired strength, impaired weight shifting, and pain, all affecting her ability to safely and independently perform basic mobility.  Goal for mod I with basic wheelchair-based mobility.     Impairment of cognition/language/swallow:  Noted to have dysphagia with goals for safe tolerance of least restrictive diet.  However, IP SLP noting did not anticipate further SLP services at ARU, no issues with tolerating regular diet.  Will not consult initially.  If any concern for difficulty tolerating current diet, can consult.     Medical Conditions  New actions/orders/updates for today are in blue.     S/p right AKA 4/1/24, completed 4/9/24 due to critical limb ischemia, acute occlusion of right common femoral artery to mid anterior tibial artery bypass  "graft   PAD/PVD with hx multiple prior vascular interventions to BLE  Bilateral Charcot-Breonna-Tooth foot deformities  Acute post-op pain  - Wound care: daily dressing changes to right AKA with xeroform and gauze with kerlix to keep wound protected  - Dehiscence of lateral surgical incision with some increased serosanguinous drainage.  Also with multiple areas with necrotic appearance.  Overall similar to day of ARU admission although with more drainage.  Updated photos in chart and requested vascular surgery to review.  Still waiting on response, [facility] contacted by [facility] on 4/25 and 4/26 and staff surgeon contacted by [facility] on 4/26.  - Was noted to have elevated temp at 100.4F yesterday evening after return from HD.  Per nursing, patient had \"8\" blankets on as she feels quite cold during HD.  On reassessment, temp down to 99.0.  Remains afebrile.  - Also with significant itching at surgical site.  Recently seen by derm for dermatitis as below and recommended to trial zyrtec or claritin for itching.  Start zyrtec 5 mg daily on 4/25.  - NWB RLE  - Continue PTA L foot custom orthotic   - Continue Plavix 75 mg daily (given hx left bypass).  No ongoing need for Xarelto per vascular (no recent DVT or PE)  - Pain management: APAP 500-1000 mg q6h PRN, dilaudid 2 mg q6h PRN.  Wean opioids as able.  Is still complain of phantom pain/sensations.  Will increase gabapentin to 200 mg 3x/week after HD (renally dosed).  Recommended max of 200-300 mg post-HD per pharmacy.  Did have some sedation on 100 mg TID prior to start of HD; monitor for any recurrent sedation as increasing this dose.  - Continue PT/OT  - Follow up with vascular surgery in 2-4 weeks     Acute blood loss anemia on anemia of chronic disease, hx iron deficiency  Retroperitoneal hematoma  Recent GI bleed (hospitalized 12/2023)  Required intermittent transfusion during this admission (3/30, 3/31, 4/2, 4/6, 4/9, 4/13).  Repeat CT abdomen on 4/20 with stable right " retroperitoneal hematoma; no s/o active bleed.  4/25: Hgb stable at 9.0  - Continue epo/venofer with HD per nephrology  - Trend CBC every T/Th/Sat  - Transfuse for Hgb <7     Acute renal failure on HD  Hyponatremia  Normal baseline Cr, though per nephrology, suspect some modest CKD.  LIMA this admission, up to peak Cr 4/22 on 4/3.  Likely BAILEY +/- rhabdo +/- ischemic injury with no signs of recovery and now dialysis dependent (started 4/3/24).  S/p tunneled dialysis catheter placement 4/3/24.  4/25: Cr 2.76; Na remains low at 126.  HD day.  No further recs/changes per nephrology.  - Nephrology consulted, appreciate ongoing assistance  - HD T/Th/Sat at ARU or per nephrology recs.  Plan [facility]Th[facility]Sat  - Trend BMP/mag/phos on HD days  - Continue 1L fluid restriction  - Avoid NSAIDs/nephrotoxins, renally dose medications     Bilateral pleural effusion  Noted on CT abdomen 4/20 with moderate b/l pleural effusion (left>right).  Has been intermittently requiring 1-2L supplemental oxygen.  Unclear if related to volume given new renal failure on HD, also baseline COPD.  - Monitor respiratory status, supplemental O2 by NC PRN to maintain sats >88%  - Consider thoracentesis if symptomatic or worsening hypoxia      Celiac disease  Colon distension   Loose stools, improving  Nausea/vomiting, improving  Severe malnutrition in context of acute on chronic illness  Pill cam study with [facility] in 3/2024 (due to recent GI bleed), which revealed mild non-erosive red tissue in the duodenum, some atrophic type appearance that could be celiac disease.  This was followed by celiac labs on 3/20/24 (Gliadin ab and tTG IgA antibody) which were positive and thus Celiac diagnosis was established and she was recommended for gluten-free diet.  This admission, noted to have colonic distension and circumferential wall thickening of the distal colon and rectum concerning for proctocolitis on CT abdomen.  Also with loose stools,  nausea.  Despite this, patient declined gluten-free diet (switched on 4/18).  Noted to have improvement in loose stools and nausea at discharge to ARU per sending team.  - Per discussion with RD, given K/phos are WNL and patient feeling restricted by renal diet, liberalized to 3g Na diet.  Will also continue to educate/encourage gluten-free diet, which patient may be willing to consider if otherwise less restricted.  RD discussed recommendations and rationale for gluten-free diet again on 4/24 but patient adamantly refused.  - RD consulted, appreciate assistance  - Continue scopolamine patch for now (started 4/18), wean as able  - PRN Zofran  - Continue probiotics BID  - Continue lomotil QID PRN  - Monitor symptoms  - Follow up with [facility] as outpatient    Hypomagnesemia  Replaced mag IV on 4/23 with improvement to 2.1  4/25: Mag WNL at 2.3  - Continue to trend     Hypocalcemia  4/23: Ca corrects to 8.7 for hypoalbuminemia  - Per pharmacy, was on PTA calcium carbonate/vit D PTA (had run out 1 week before admission), not ordered while at hospital.  If corrected Ca remains low, reach out to nephrology to consider if resumption indicated  - Continue to trend    CAD (hx MI x3)  HTN  Hyperlipidemia  Hx Takotsubo CM  Hx SVT  Last coronary angiogram completed 10/2023.  Recovered EF (55-60%) from ECHO 11/2023.  - Continue PTA Coreg 6.25 mg BID, amlodipine 5 mg daily  - Hold PTA lisinopril 10 mg daily due to renal failure  - PTA Jardiance 10 mg daily discontinued due to renal failure  - Rosuvastatin 10 mg daily resumed on ARU admission (previously held due to concern for rhabdo).  Monitor hepatic panel in 1 week    - Monitor BP  - Was to follow up with cardiology in Feb 2024, should be scheduled after discharge    Murmur  Noted on exam this admission by attending physiatrist.  Per chart review, intermittently documented in the past with questionable/subtle murmur (though not by cardiology).  Most recent echo 11/7/23 with trace  mitral regurg, trace tricuspid regurg, and moderate trileaflet aortic sclerosis.  - Folllow up with cardiology as above     Hx bilateral carotid artery stenosis s/p bilateral carotid endarterectomies  - Follow up with vascular surgery for annual carotid duplex (last done on 1/4/24 with stable stenosis of right carotid bulb)     Diabetes mellitus, type II  Per chart review.  A1c this admission 5.2%.  PTA Jardiance and gabapentin discontinued due to acute renal failure.  BG well-controlled during this admission without need for insulin.  - Monitor BG periodically with labs     B-cell lymphoma, stage VALENTIN  Followed by [facility] (Dr. Barrow).  Mild radiographic progression on CT 1/26/24.  Given asymptomatic, plans at that time for ongoing CT monitoring.  - Monitor for development of any B symptoms  - Trend CBC  - Follow up with oncology, repeat CT as planned in 7/2024     COPD  Tobacco use disorder  PTA smoking ~5 cigarettes per day  - Monitor respiratory status, supplemental O2 by NC PRN to maintain sats >88%  - Continue PTA Breo Ellipta  - Nicotine patch 14 mcg/day  - Ongoing education/resources for cessation     Adjustment disorder with mixed mood  Hx MDD/anxiety (per chart though patient denies)  Delirium, resolved  Not on medications PTA.  Patient denies any hx depression/anxiety but does note depressed mood in setting of recent AKA and significant home stressors.  - Psychology and spiritual services consulted, appreciate assist  - Monitor mood     GERD  PTA on omeprazole 20 mg daily  - Continue pepcid 20 mg q48 hours (renally dosed) given allergy to pantoprazole (formulary sub for omeprazole)     Spongiotic dermatitis  Recently seen by OP dermatology, recommended betamethasone cream BID PRN, not using regularly at hospital  - Consider resumption of topical steroids if recurrent symptoms  - Started Zyrtec 4/25 as above for itching near surgical site     Sacral wound: pressure injury (stage 2 vs 3),  incontinence-associated dermatitis, moisture-associated skin damage  Assessed by WOCN on 4/22 at [facility] hospital.  - WOCN consulted for ongoing follow-up at ARU  - Wound care per WOCN orders  - Pressure reduction strategies     Adjustment to disability:  Clinical psychology to eval and treat if indicated  FEN: 3g Na diet, 1000 mL fluid restriction  Bowel: incontinent, recent loose stools as above  Bladder: incontinent, monitor PVRs at admission  DVT Prophylaxis: subcutaneous heparin  GI Prophylaxis: pepcid  Code: full, confirmed on admission  Disposition: goal for home  ELOS:  target 5/13/24  Follow up Appointments on Discharge: PCP in 1-2 weeks, vascular surgery in 2 weeks, [facility], nephrology (ongoing HD), heme/onc ([facility], 7/2024)      30 minutes spent on the date of service doing chart review, history and exam, documentation, and further activities as noted above.       Plan of care was discussed with Dr. Arun Pimentel, PM&R Staff Physician    Corrina Hurt PA-C  Physical Medicine & Rehabilitation

## 2024-04-26 NOTE — PLAN OF CARE
Goal Outcome Evaluation:  Pt. Is A/O x 4, able to make needs known. Call light within reach. Pt. Denies,SOB, CP, N/V. No acute changes on this shift. Continue POC,

## 2024-04-26 NOTE — PLAN OF CARE
Discharge Plan: home with HH PT      Precautions: fall, NWB of RLE, dialysis, sacral wound, elevate residual limb when in bed    Current Status:  Bed Mobility: ind rolling, Jose sup<>sit  Transfer: Slideboard minAx1 bed<>w/c. Golvo Ax2 to commode  Gait: unable, unsafe  Stairs: not tested  Balance: Stable dynamic sitting.     Assessment: Pt upgraded to slideboard transfers minAx1 w/ nsg for bed<>w/c, will continue golvo to commode. Provided with ramp resources. Encouraged pt and family pursue ASAP.       Other Barriers to Discharge (DME, Family Training, etc):   DME: K3 w/c, slideboard, bedrail.     Need to confirm family support.

## 2024-04-26 NOTE — PLAN OF CARE
Goal Outcome Evaluation:    Overall Patient Progress: no change    Outcome Evaluation: No change in Pt progress this shift.    Pt is alert and oriented. Can take pills whole. On 1L fluid restriction. Incontinent of B&B. LBM 4/25. Ax2 golvo. Denied pain, SOB, CP, and n/t. Call light within reach and bed alarms on. Repositioning and incontinence checks provided throughout the shift. Pt slept through majority of the shift. Will continue with POC.

## 2024-04-26 NOTE — PLAN OF CARE
Discharge Planner Post-Acute Rehab OT:     Discharge Plan: home with HH OT     Precautions: fall, NWB of RLE, dialysis, sacral wound, elevate residual limb when in bed    Current Status:  ADLs:  Mobility: WC based, liko lift Ax2, min A for slide board in therapy  Grooming: set up seated  Dressing: UB: set up, LB: set up in supine  Bathing: TBD  Toileting: liko lift to commode  IADLs: Pt does most IADL including caregiving for .   Vision/Cognition: intact    Assessment: Pt progressing with in bed LB dressing. Working on building self efficacy with functional mobility in the WC. Pt incontinent this AM of BM.    Other Barriers to Discharge (DME, Family Training, etc): DME, family training, pt has limited support despite living with multiple family members.

## 2024-04-27 ENCOUNTER — APPOINTMENT (OUTPATIENT)
Dept: PHYSICAL THERAPY | Facility: CLINIC | Age: 64
DRG: 559 | End: 2024-04-27
Attending: PHYSICAL MEDICINE & REHABILITATION
Payer: COMMERCIAL

## 2024-04-27 ENCOUNTER — APPOINTMENT (OUTPATIENT)
Dept: OCCUPATIONAL THERAPY | Facility: CLINIC | Age: 64
DRG: 559 | End: 2024-04-27
Attending: PHYSICAL MEDICINE & REHABILITATION
Payer: COMMERCIAL

## 2024-04-27 LAB
ANION GAP SERPL CALCULATED.3IONS-SCNC: 9 MMOL/L (ref 7–15)
BUN SERPL-MCNC: 29 MG/DL (ref 8–23)
CALCIUM SERPL-MCNC: 7.7 MG/DL (ref 8.8–10.2)
CHLORIDE SERPL-SCNC: 93 MMOL/L (ref 98–107)
CREAT SERPL-MCNC: 2.67 MG/DL (ref 0.51–0.95)
DEPRECATED HCO3 PLAS-SCNC: 25 MMOL/L (ref 22–29)
EGFRCR SERPLBLD CKD-EPI 2021: 19 ML/MIN/1.73M2
ERYTHROCYTE [DISTWIDTH] IN BLOOD BY AUTOMATED COUNT: 18.5 % (ref 10–15)
GLUCOSE SERPL-MCNC: 80 MG/DL (ref 70–99)
HCT VFR BLD AUTO: 27.2 % (ref 35–47)
HGB BLD-MCNC: 8.7 G/DL (ref 11.7–15.7)
MAGNESIUM SERPL-MCNC: 1.4 MG/DL (ref 1.7–2.3)
MCH RBC QN AUTO: 30.9 PG (ref 26.5–33)
MCHC RBC AUTO-ENTMCNC: 32 G/DL (ref 31.5–36.5)
MCV RBC AUTO: 97 FL (ref 78–100)
PHOSPHATE SERPL-MCNC: 2.7 MG/DL (ref 2.5–4.5)
PLATELET # BLD AUTO: 272 10E3/UL (ref 150–450)
POTASSIUM SERPL-SCNC: 4.5 MMOL/L (ref 3.4–5.3)
RBC # BLD AUTO: 2.82 10E6/UL (ref 3.8–5.2)
SODIUM SERPL-SCNC: 127 MMOL/L (ref 135–145)
WBC # BLD AUTO: 4.3 10E3/UL (ref 4–11)

## 2024-04-27 PROCEDURE — 250N000013 HC RX MED GY IP 250 OP 250 PS 637: Performed by: PHYSICIAN ASSISTANT

## 2024-04-27 PROCEDURE — 85027 COMPLETE CBC AUTOMATED: CPT | Performed by: PHYSICIAN ASSISTANT

## 2024-04-27 PROCEDURE — 250N000011 HC RX IP 250 OP 636: Performed by: INTERNAL MEDICINE

## 2024-04-27 PROCEDURE — 128N000003 HC R&B REHAB

## 2024-04-27 PROCEDURE — 97535 SELF CARE MNGMENT TRAINING: CPT | Mod: GO | Performed by: STUDENT IN AN ORGANIZED HEALTH CARE EDUCATION/TRAINING PROGRAM

## 2024-04-27 PROCEDURE — 634N000001 HC RX 634: Mod: JZ | Performed by: INTERNAL MEDICINE

## 2024-04-27 PROCEDURE — 84100 ASSAY OF PHOSPHORUS: CPT | Performed by: PHYSICIAN ASSISTANT

## 2024-04-27 PROCEDURE — 99233 SBSQ HOSP IP/OBS HIGH 50: CPT | Mod: 24 | Performed by: INTERNAL MEDICINE

## 2024-04-27 PROCEDURE — 258N000003 HC RX IP 258 OP 636: Performed by: INTERNAL MEDICINE

## 2024-04-27 PROCEDURE — 99232 SBSQ HOSP IP/OBS MODERATE 35: CPT | Mod: GC | Performed by: PHYSICAL MEDICINE & REHABILITATION

## 2024-04-27 PROCEDURE — 80048 BASIC METABOLIC PNL TOTAL CA: CPT | Performed by: PHYSICIAN ASSISTANT

## 2024-04-27 PROCEDURE — 97110 THERAPEUTIC EXERCISES: CPT | Mod: GO | Performed by: STUDENT IN AN ORGANIZED HEALTH CARE EDUCATION/TRAINING PROGRAM

## 2024-04-27 PROCEDURE — 97530 THERAPEUTIC ACTIVITIES: CPT | Mod: GP

## 2024-04-27 PROCEDURE — 90935 HEMODIALYSIS ONE EVALUATION: CPT

## 2024-04-27 PROCEDURE — 83735 ASSAY OF MAGNESIUM: CPT | Performed by: PHYSICIAN ASSISTANT

## 2024-04-27 PROCEDURE — 250N000011 HC RX IP 250 OP 636: Performed by: PHYSICAL MEDICINE & REHABILITATION

## 2024-04-27 PROCEDURE — 36415 COLL VENOUS BLD VENIPUNCTURE: CPT | Performed by: PHYSICIAN ASSISTANT

## 2024-04-27 PROCEDURE — 250N000011 HC RX IP 250 OP 636: Performed by: PHYSICIAN ASSISTANT

## 2024-04-27 RX ORDER — MAGNESIUM SULFATE HEPTAHYDRATE 40 MG/ML
2 INJECTION, SOLUTION INTRAVENOUS ONCE
Status: COMPLETED | OUTPATIENT
Start: 2024-04-27 | End: 2024-04-27

## 2024-04-27 RX ADMIN — MICONAZOLE NITRATE: 20 POWDER TOPICAL at 20:28

## 2024-04-27 RX ADMIN — NICOTINE 1 PATCH: 14 PATCH, EXTENDED RELEASE TRANSDERMAL at 08:04

## 2024-04-27 RX ADMIN — CARVEDILOL 6.25 MG: 6.25 TABLET, FILM COATED ORAL at 18:38

## 2024-04-27 RX ADMIN — MAGNESIUM SULFATE HEPTAHYDRATE 2 G: 40 INJECTION, SOLUTION INTRAVENOUS at 20:18

## 2024-04-27 RX ADMIN — CETIRIZINE HYDROCHLORIDE 5 MG: 5 TABLET ORAL at 08:04

## 2024-04-27 RX ADMIN — ONDANSETRON 4 MG: 4 TABLET, ORALLY DISINTEGRATING ORAL at 08:42

## 2024-04-27 RX ADMIN — EPOETIN ALFA-EPBX 10000 UNITS: 10000 INJECTION, SOLUTION INTRAVENOUS; SUBCUTANEOUS at 14:14

## 2024-04-27 RX ADMIN — HEPARIN SODIUM 1800 UNITS: 1000 INJECTION, SOLUTION INTRAVENOUS; SUBCUTANEOUS at 16:40

## 2024-04-27 RX ADMIN — GABAPENTIN 200 MG: 100 CAPSULE ORAL at 20:27

## 2024-04-27 RX ADMIN — HEPARIN SODIUM 1800 UNITS: 1000 INJECTION, SOLUTION INTRAVENOUS; SUBCUTANEOUS at 16:41

## 2024-04-27 RX ADMIN — Medication 1 CAPSULE: at 08:04

## 2024-04-27 RX ADMIN — HYDROMORPHONE HYDROCHLORIDE 2 MG: 2 TABLET ORAL at 20:08

## 2024-04-27 RX ADMIN — CARVEDILOL 6.25 MG: 6.25 TABLET, FILM COATED ORAL at 08:04

## 2024-04-27 RX ADMIN — ROSUVASTATIN CALCIUM 10 MG: 5 TABLET, COATED ORAL at 20:27

## 2024-04-27 RX ADMIN — SODIUM CHLORIDE 300 ML: 9 INJECTION, SOLUTION INTRAVENOUS at 12:50

## 2024-04-27 RX ADMIN — CLOPIDOGREL BISULFATE 75 MG: 75 TABLET ORAL at 08:04

## 2024-04-27 RX ADMIN — Medication 1 CAPSULE: at 20:27

## 2024-04-27 RX ADMIN — HEPARIN SODIUM 5000 UNITS: 5000 INJECTION, SOLUTION INTRAVENOUS; SUBCUTANEOUS at 20:27

## 2024-04-27 RX ADMIN — AMLODIPINE BESYLATE 5 MG: 5 TABLET ORAL at 08:04

## 2024-04-27 RX ADMIN — SODIUM CHLORIDE 250 ML: 9 INJECTION, SOLUTION INTRAVENOUS at 12:50

## 2024-04-27 RX ADMIN — ONDANSETRON 4 MG: 4 TABLET, ORALLY DISINTEGRATING ORAL at 20:08

## 2024-04-27 RX ADMIN — HEPARIN SODIUM 5000 UNITS: 5000 INJECTION, SOLUTION INTRAVENOUS; SUBCUTANEOUS at 08:04

## 2024-04-27 ASSESSMENT — ACTIVITIES OF DAILY LIVING (ADL)
ADLS_ACUITY_SCORE: 38
ADLS_ACUITY_SCORE: 39
ADLS_ACUITY_SCORE: 39
ADLS_ACUITY_SCORE: 38
ADLS_ACUITY_SCORE: 39
ADLS_ACUITY_SCORE: 38
ADLS_ACUITY_SCORE: 39
ADLS_ACUITY_SCORE: 39
ADLS_ACUITY_SCORE: 38

## 2024-04-27 NOTE — PROGRESS NOTES
Nephrology Progress Note  04/27/2024         Assessment & Recommendations:   Shirley Hendricks is a 63 year old year old with peripheral artery disease, coronary artery disease and history of MI, hypertension, diabetes mellitus type 2 who is admitted to ARU following hospitalization for occlusion of right LE bypass graft managed with right above-the-knee amputation and complication of acute kidney injury requiring dialysis.     #Acute kidney injury: Felt to be due to contrast nephropathy as she received contrast 3 days in a row for evaluation and management of her occluded bypass graft.  There is also potentially some component of rhabdomyolysis.  -First dialysis run 4/3/2024.  -Access tunneled CVC initially placed 4/3/2024 and revised on 4/15/2024.  -Suspicion of underlying diabetic nephropathy and/or renovascular disease.  - continue dialysis Tuesday, Thursday, Saturday  - has been receiving hemodialysis at [facility] and consents to ongoing hemodialysis while at [facility].    -Dialysis today.  - UOP once a day at this time.  Note pleural effusion on imaging and so we will challenge volume status as able  -continue to monitor for renal recovery. Please order creatinine for Tuesday 4/30 before HD  - can consider diuretic challenge on non dialysis day.       #Hyponatremia: Sodium  before HD was 127.  This is due to fluid intake in the absence of renal function.    - 1 L daily fluid restriction     #PAD/PVD  #Right common femoral artery to the mid anterior tibial artery bypass graft acute occlusion  #Right above-the-knee amputation 4/9/2024  - Plavix 75 mg daily (previously on Xarelto but no ongoing indication)     #Anemia due to acute blood loss with retroperitoneal hematoma and history of GI bleeding in December 2023.  - Has required transfusion with PRBCs.  -Has been receiving EPO 10K and Venofer with hemodialysis  -Will order hemoglobin for next dialysis treatment.  - retacrit 10K units three times weekly  "with HD     #Coronary artery disease with history of myocardial infarction x 3  #History of Takotsubo cardiomyopathy  PTA carvedilol and lisinopril, lisinopril has been on hold.  PTA rosuvastatin was initially held due to rhabdo but resumed.  PTA empagliflozin 10 mg daily has been discontinued given acute kidney injury     #Diabetes mellitus type 2  - Not requiring medications at this time     #Stage Mark marginal zone B-cell lymphoma  - Undergoing surveillance with [place] [facility]     Recommendations were communicated to primary team via note    Oso Gabriele Mejia MD   Division of Renal Disease and Hypertension  [facility]  Vocera Web Console    Interval History :   Nursing and provider notes from last 24 hours reviewed.  In the last 24 hours Shirley is doing OK, still al lot of pain, had  a lot of liquid stool, no blood in stool, no pain after dialyisis. Today feels better. Continues to have edema of left leg, will have HD today. One urine occurrence recorded.    Review of Systems:   I reviewed the following systems:  GI: stable appetite. no nausea or vomiting, + diarrhea.   Neuro:  no confusion  Constitutional:  no fever or chills  CV: no dyspnea or edema.  no chest pain.    Physical Exam:   I/O last 3 completed shifts:  In: -   Out: 200 [Emesis/NG output:200]   BP (!) 179/74 (BP Location: Left arm, Cuff Size: Adult Regular)   Pulse 62   Temp 97.2  F (36.2  C) (Axillary)   Resp 16   Ht 1.549 m (5' 0.98\")   Wt 51.8 kg (114 lb 3.2 oz)   LMP  (LMP Unknown)   SpO2 97%   BMI 21.59 kg/m       GENERAL APPEARANCE: no distress  EYES:  no scleral icterus, pupils equal  Mouth: dry mucosa  PULM: normal work of breathing  CV: 2+ left foot edema  INTEGUMENT: no cyanosis, no rash  NEURO:  speech fluent, face symmetric    Labs:   All labs reviewed by me  Electrolytes/Renal -   Recent Labs   Lab Test 04/27/24  0722 04/26/24  0210 04/25/24  0540 04/23/24  2136 04/23/24  0602   *  --  126*  --  124*   POTASSIUM 4.5  " --  4.3  --  4.3   CHLORIDE 93*  --  95*  --  92*   CO2 25  --  24  --  22   BUN 29.0*  --  36.9*  --  52.7*   CR 2.67*  --  2.76*  --  2.83*   GLC 80 87 86  --  74   SONIA 7.7*  --  7.7*  --  7.4*   MAG 1.4*  --  2.3 2.1 1.5*   PHOS 2.7  --  2.8  --  4.3       CBC -   Recent Labs   Lab Test 04/27/24  0722 04/25/24  0540 04/20/24  1821 04/20/24  0645 04/18/24  0604 04/17/24  0636   WBC 4.3 4.4  --   --   --  8.5   HGB 8.7* 9.0* 8.6* 7.4*   < > 8.5*    282  --  170  --  225    < > = values in this interval not displayed.       LFTs -   Recent Labs   Lab Test 04/23/24  0602 04/17/24  0636 04/16/24  0642 04/15/24  0536 04/09/24  0636 04/04/24  0541   ALKPHOS 96  --   --   --  155* 192*   BILITOTAL 0.5  --   --   --  0.9 0.6   ALT 20  --   --   --  53* 326*   AST 16  --   --   --  68* 301*   PROTTOTAL 4.1*  --   --   --  3.9* 4.2*   ALBUMIN 1.9* 2.3* 2.1*   < > 1.8* 2.1*    < > = values in this interval not displayed.       Iron Panel -   Recent Labs   Lab Test 04/10/24  0559 04/09/24  1817 01/08/24  1542 12/11/23  0550   IRON 22*  --  71 122   IRONSAT 18  --  17 32   BRYN  --  609* 25 23       Current Medications:  Current Facility-Administered Medications   Medication Dose Route Frequency Provider Last Rate Last Admin    amLODIPine (NORVASC) tablet 5 mg  5 mg Oral Daily Corrina Hurt PA-C   5 mg at 04/27/24 0804    carvedilol (COREG) tablet 6.25 mg  6.25 mg Oral BID w/meals Corrina Hurt PA-C   6.25 mg at 04/27/24 0804    cetirizine (zyrTEC) tablet 5 mg  5 mg Oral Daily Corrina Hurt PA-C   5 mg at 04/27/24 0804    clopidogrel (PLAVIX) tablet 75 mg  75 mg Oral Daily Corrina Hurt PA-C   75 mg at 04/27/24 0804    famotidine (PEPCID) tablet 20 mg  20 mg Oral Q48H Corrina Hurt PA-C   20 mg at 04/26/24 1624    fluticasone-vilanterol (BREO ELLIPTA) 200-25 MCG/ACT inhaler 1 puff  1 puff Inhalation Daily Corrina Hurt PA-C   1 puff at 04/26/24 0932    gabapentin (NEURONTIN)  capsule 200 mg  200 mg Oral Once per day on Tuesday Thursday Saturday Corrina Hurt PA-C        heparin ANTICOAGULANT injection 5,000 Units  5,000 Units Subcutaneous Q12H Corrina Hurt PA-C   5,000 Units at 04/27/24 0804    lactobacillus rhamnosus (GG) (CULTURELL) capsule 1 capsule  1 capsule Oral BID Corrina Hurt PA-C   1 capsule at 04/27/24 0804    miconazole (MICATIN) 2 % powder   Topical BID Corrina Hurt PA-C   Given at 04/25/24 2054    multivitamin RENAL (TRIPHROCAPS) capsule 1 capsule  1 capsule Oral Daily Corrina Hurt PA-C   1 capsule at 04/27/24 0804    nicotine (NICODERM CQ) 14 MG/24HR 24 hr patch 1 patch  1 patch Transdermal Daily Corrina Hurt PA-C   1 patch at 04/27/24 0804    rosuvastatin (CRESTOR) tablet 10 mg  10 mg Oral At Bedtime Corrina Hurt PA-C   10 mg at 04/26/24 2100    scopolamine (TRANSDERM) 72 hr patch 1 patch  1 patch Transdermal Q72H Corrina Hurt PA-C   1 patch at 04/26/24 1215    And    scopolamine (TRANSDERM-SCOP) Patch in Place   Transdermal Q8H Corrina Hurt PA-C         Current Facility-Administered Medications   Medication Dose Route Frequency Provider Last Rate Last Admin     Soo Gabriele Mejia MD

## 2024-04-27 NOTE — PLAN OF CARE
Goal Outcome Evaluation:    Overall Patient Progress: no change    Outcome Evaluation: No change in Pt progress this shift.    Pt is alert and oriented. Incontinent of bowel. LBM 4/26. Ax2 golvo. No c/o pain this shift. Breathing is regular and patterned. Encouraged Pt to reposition and educated on importance of maintaining a dry peritoneal area to prevent skin injuries. Call light within reach and bed alarms on. Pt slept for majority of the shift. Will continue with POC.

## 2024-04-27 NOTE — PLAN OF CARE
Discharge Planner Post-Acute Rehab OT:     Discharge Plan: home with HH OT     Precautions: fall, NWB of RLE, dialysis, sacral wound, elevate residual limb when in bed    Current Status:  ADLs:  Mobility: WC based, liko lift Ax2, min A for slide board in therapy  Grooming: set up seated  Dressing: UB: set up, LB: set up in supine  Bathing: TBD  Toileting: liko lift to commode  IADLs: Pt does most IADL including caregiving for .   Vision/Cognition: intact    Assessment: -30 min due to emesis. Nursing aware. Second session pt able to participate somewhat better despite still having nausea. Pt's urgent loose stools  impact ability to implement any kind of toileting routine on the commode .    Other Barriers to Discharge (DME, Family Training, etc): DME, family training, pt has limited support despite living with multiple family members.

## 2024-04-27 NOTE — PLAN OF CARE
"Rehabilitation Medicine Wheelchair Face to Face     Diagnosis and ICD Code: 05.3 Unilateral LE AKA; emergent R AKA due to acute occlusion of RLE bypass graft, s/p AKA unilateral, right.   Currently has limited mobility due to pain, amputation, and RLE weakness related to baseline Charcot Breonna Tooth deformity, impaired sensation, poor activity tolerance.  Current transfer status: slideboard w/ CGA  Distance patient currently able to walk: Pt unable to walk.    Therapy team has ruled out the following less costly options:   Cane due to patient is unable to stand.   Walker due to patient is unable to stand.    Patient will use wheelchair to complete Mobility Related ADL's in the home including toileting, dressing, self cares, meal prep, and IADLs.  Without a wheelchair patient will be unable to safely move about their home.  This equipment will allow them to be out of bed, participate in home activities with their family, and it will be part of a fall prevention plan for safe mobility.   Patient's home will accommodate use of wheelchair.  Patient has a family member willing and able to assist as necessary with wheelchair.   Patient has not expressed that they are unwilling to use the wheel chair.    Patient needs a erika-height chair due to small body stature in order to self propel with their feet.     Arm and leg strength: LLE grossly 4/5. BUEs grossly 4+/5.     Gait/balance/coordination: Stable dynamic sitting balance. Pt is unable to stand.     Length of need: Lifetime    Current height/weight: 5' .984\"/114 lbs 3.17 oz    :1960    Arun Pimentel MD   NPI:       Signature:  Date:        "

## 2024-04-27 NOTE — PLAN OF CARE
Goal Outcome Evaluation:      Plan of Care Reviewed With: patient    Overall Patient Progress: no change    Outcome Evaluation: Pt. alert oriented x4. Calls appropriately,C/O nausea and vomiting in AM prn Zofran given. VSS. On regular thin with fair appetite. Pt. appears tired to do therapy. Incontinent of bowel, will need to have stool specimen for C.diff. explanation given to patient and will inform writer or NA if she needs to go, no specimen sent yet patient on dialysis from 12:30 PM-6:00 PM, refused R AKA incision dressing..  Pt. Magnesium 1.4 reviewed by Nephrology, PMR informed.

## 2024-04-27 NOTE — PLAN OF CARE
Goal Outcome Evaluation:      Plan of Care Reviewed With: patient    Overall Patient Progress: no changeOverall Patient Progress: no change    Outcome Evaluation: Patient refused her shower this evening.    Orientation:A/O x4, VSS on RA.    Bowel:incont in brief this shift.  LBM 4/26.    Bladder:makes minimal urine, on KD.  Incont x1 this shift.   Pain:c/o Right AKA pain, prn dilaudid given x1 this shift.   Ambulation/Transfers:up with assist 1-2 with slide board to chair. Golvo to commode.    Diet/ Liquids:3gm NA, thin liquids 1L FR.  Takes pills whole.   Tubes/ Lines/ Drains: Left FA PIV SL.  Right internal jugular for KD.    Skin: nicotine patch to left shoulder, scopolamine patch behind left ear. Right AKA dressing changed this evening per orders.  Mepilex to coccxy c/d/I.    Bed alarm on for safety, call light within reach. Continue with POC.

## 2024-04-27 NOTE — PROGRESS NOTES
HEMODIALYSIS TREATMENT NOTE    Date: 3/14/2024  Time: 1653    Data:  Pre Wt: 51.8 kg bed scale  Desired Wt: - 3  kg  Post Wt: -3 kg  Weight change: -3 kg  Ultrafiltration - Post Run Net Total Removed (mL): 3000 mL  Vascular Access Status: patent  Dialyzer Rinse: Light streaked  Total Blood Volume Processed: 77.0 Liters  Total Dialysis (Treatment) Time: 3.5  Hours  Access: RIJ tunneled cvc     Heparin: none     Lab:  No     Assessment/Interventions:  - A & O X 4  - able to make needs known  - RA     Ran with K2 Ca3 bath per protocol, for K serum 4.5 mmol/L and Ca serum 7.7 mg/dL. Lines functioning well. Tolerated and completed HD tx. 400 BFR  achieved and maintained, Vital signs monitored every 15 min and PRN, remained asymptomatic, hemodynamically stable throughout hemodialysis.  Post rinsed back successfully, lumens locked with heparin.     Meds given: Epo 10,000 units IV     See Flowsheet and MAR for further details.     Handoff report given to MIN Vogel and endorsed pt with stable condition.               Plan:    Per renal team      Sierra Martínez RN  Acute Dialysis RN  [facility]

## 2024-04-27 NOTE — PLAN OF CARE
Discharge Plan: home with HH PT      Precautions: fall, NWB of RLE, dialysis, sacral wound, elevate residual limb when in bed    Current Status:  Bed Mobility: ind rolling, Jose sup<>sit  Transfer: Slideboard minAx1 bed<>w/c. Golvo Ax2 to commode  Gait: unable, unsafe  Stairs: not tested  Balance: Stable dynamic sitting.     Assessment: Pt nauseous during session, exacerbated by movement. Session focused on providing amputee education resources and DME preparation for discharge.     Other Barriers to Discharge (DME, Family Training, etc):   DME: K3 w/c (order to be faxed 4/29), slideboard, bedrail.     Need to confirm family support.

## 2024-04-27 NOTE — PROGRESS NOTES
[facility]   Acute Rehabilitation Unit  Daily progress note    INTERVAL HISTORY  Shirley Hendricks was seen at bedside this morning.  Patient had questions about state of incision, discussed have reached out to vascular team who will see patient tomorrow morning. Patient noted continued loose stools, discussed will rule out C. Diff and switch to gluten free diet which has worked for patient in the past and has not been on gluten free diet since being in the hospital when her loose stools first started.       MEDICATIONS  Current Facility-Administered Medications   Medication Dose Route Frequency Provider Last Rate Last Admin    amLODIPine (NORVASC) tablet 5 mg  5 mg Oral Daily Corrina Hurt PA-C   5 mg at 04/27/24 0804    carvedilol (COREG) tablet 6.25 mg  6.25 mg Oral BID w/meals Corrina Hurt PA-C   6.25 mg at 04/27/24 0804    cetirizine (zyrTEC) tablet 5 mg  5 mg Oral Daily Corrina Hurt PA-C   5 mg at 04/27/24 0804    clopidogrel (PLAVIX) tablet 75 mg  75 mg Oral Daily Corrina Hurt PA-C   75 mg at 04/27/24 0804    epoetin mireya-epbx (RETACRIT) injection 10,000 Units  10,000 Units Intravenous Once in dialysis/CRRT Tamica Garcia MD        famotidine (PEPCID) tablet 20 mg  20 mg Oral Q48H Corrina Hurt PA-C   20 mg at 04/26/24 1624    fluticasone-vilanterol (BREO ELLIPTA) 200-25 MCG/ACT inhaler 1 puff  1 puff Inhalation Daily Corrina Hurt PA-C   1 puff at 04/26/24 0932    gabapentin (NEURONTIN) capsule 200 mg  200 mg Oral Once per day on Tuesday Thursday Saturday Corrina Hurt PA-C        sodium chloride 0.9% DIALYSIS Cath LOCK - RED Lumen  10 mL Intracatheter Once in dialysis/CRRT Tamica Garcia MD        Followed by    heparin 1000 unit/mL DIALYSIS Cath LOCK - RED Lumen  1.3-2.6 mL Intracatheter Once in dialysis/CRRT Tamica Garcia MD        sodium chloride 0.9% DIALYSIS Cath LOCK - BLUE Lumen  10 mL  Intracatheter Once in dialysis/CRRT Tamica Garcia MD        Followed by    heparin 1000 unit/mL DIALYSIS Cath LOCK -BLUE Lumen  1.3-2.6 mL Intracatheter Once in dialysis/CRRT Tamica Garcia MD        heparin ANTICOAGULANT injection 5,000 Units  5,000 Units Subcutaneous Q12H Corrina Hurt PA-C   5,000 Units at 04/27/24 0804    lactobacillus rhamnosus (GG) (CULTURELL) capsule 1 capsule  1 capsule Oral BID Corrina Hurt PA-C   1 capsule at 04/27/24 0804    miconazole (MICATIN) 2 % powder   Topical BID Corrina Hurt PA-C   Given at 04/25/24 2054    multivitamin RENAL (TRIPHROCAPS) capsule 1 capsule  1 capsule Oral Daily Corrina Hurt PA-C   1 capsule at 04/27/24 0804    nicotine (NICODERM CQ) 14 MG/24HR 24 hr patch 1 patch  1 patch Transdermal Daily Corrina Hurt PA-C   1 patch at 04/27/24 0804    No heparin via hemodialysis machine   Does not apply Once Tamica Garcia MD        rosuvastatin (CRESTOR) tablet 10 mg  10 mg Oral At Bedtime Corrina Hurt PA-C   10 mg at 04/26/24 2100    scopolamine (TRANSDERM) 72 hr patch 1 patch  1 patch Transdermal Q72H Corrina Hurt PA-C   1 patch at 04/26/24 1215    And    scopolamine (TRANSDERM-SCOP) Patch in Place   Transdermal Q8H Corrina Hurt PA-C        sodium chloride (PF) 0.9% PF flush 9 mL  9 mL Intracatheter During Dialysis/CRRT (from stock) Tamica Garcia MD        sodium chloride (PF) 0.9% PF flush 9 mL  9 mL Intracatheter During Dialysis/CRRT (from stock) Tamica Garcia MD        sodium chloride (PF) 0.9% PF flush 9 mL  9 mL Intracatheter During Dialysis/CRRT (from stock) Tamica Garcia MD        sodium chloride (PF) 0.9% PF flush 9 mL  9 mL Intracatheter During Dialysis/CRRT (from stock) Tamica Garcia MD        sodium chloride 0.9% BOLUS 250 mL  250 mL Intravenous Once in dialysis/CRRT Tamica Garcia MD        sodium chloride 0.9% BOLUS 300 mL  300 mL Hemodialysis  Machine Once Tamica Garcia MD              Current Facility-Administered Medications   Medication Dose Route Frequency Provider Last Rate Last Admin    acetaminophen (TYLENOL) tablet 500-1,000 mg  500-1,000 mg Oral Q6H PRN Corrina Hurt PA-C   1,000 mg at 04/25/24 1743    alteplase (CATHFLO ACTIVASE) injection 2 mg  2 mg Intracatheter Q1H PRN Tamica Garcia MD        alteplase (CATHFLO ACTIVASE) injection 2 mg  2 mg Intracatheter Q1H PRN Tamica Garcia MD        alteplase (CATHFLO ACTIVASE) injection 2 mg  2 mg Intracatheter Q1H PRN Tamica Garcia MD        alteplase (CATHFLO ACTIVASE) injection 2 mg  2 mg Intracatheter Q1H PRN Tamica Garcia MD        diphenoxylate-atropine (LOMOTIL) 2.5-0.025 MG per tablet 1 tablet  1 tablet Oral 4x Daily PRN Corrina Hurt PA-C   1 tablet at 04/25/24 1941    HYDROmorphone (DILAUDID) tablet 2 mg  2 mg Oral Q6H PRN Corrina Hurt PA-C   2 mg at 04/26/24 1722    naloxone (NARCAN) injection 0.2 mg  0.2 mg Intravenous Q2 Min PRN Arun Pimentel MD        Or    naloxone (NARCAN) injection 0.4 mg  0.4 mg Intravenous Q2 Min PRN Arun Pimentel MD        Or    naloxone (NARCAN) injection 0.2 mg  0.2 mg Intramuscular Q2 Min PRN Arun Pimentel MD        Or    naloxone (NARCAN) injection 0.4 mg  0.4 mg Intramuscular Q2 Min PRN Arun Pimentel MD        nitroGLYcerin (NITROSTAT) sublingual tablet 0.4 mg  0.4 mg Sublingual Q5 Min PRN Corrina Hurt PA-C        ondansetron (ZOFRAN ODT) ODT tab 4 mg  4 mg Oral Q6H PRN Corrina Hurt PA-C   4 mg at 04/27/24 0842    polyethylene glycol (MIRALAX) powder 17 g  17 g Oral Daily PRN Corrina Hurt PA-C        senna-docusate (SENOKOT-S/PERICOLACE) 8.6-50 MG per tablet 1-2 tablet  1-2 tablet Oral BID PRN Corrina Hurt PA-C        sodium chloride (PF) 0.9% PF flush 10 mL  10 mL Intracatheter Q15 Min PRN Tamica Garcia MD        sodium chloride (PF) 0.9% PF flush 10 mL  10 mL Intracatheter  "Q15 Min PRTamica Castellanos MD        sodium chloride (PF) 0.9% PF flush 10 mL  10 mL Intracatheter Q15 Min Tamica Leonardo MD        sodium chloride (PF) 0.9% PF flush 10 mL  10 mL Intracatheter Q15 Min Tamica Leonardo MD        sodium chloride 0.9% BOLUS 100-150 mL  100-150 mL Intravenous Q15 Min Tamica Leonardo MD        sodium chloride 0.9% BOLUS 100-150 mL  100-150 mL Hemodialysis Machine Q15 Min Tamica Leonardo MD        sodium chloride 0.9% BOLUS 100-150 mL  100-150 mL Intravenous Q15 Min aTmica Leonardo MD            PHYSICAL EXAM  BP (!) 180/64 (BP Location: Left arm, Patient Position: Semi-Myers's, Cuff Size: Adult Regular)   Pulse 77   Temp 98.7  F (37.1  C) (Oral)   Resp 16   Ht 1.549 m (5' 0.98\")   Wt 51.8 kg (114 lb 3.2 oz)   LMP  (LMP Unknown)   SpO2 92%   BMI 21.59 kg/m    Gen: NAD, up in chair  HEENT: NC/AT, MMM  Cardio: +dialysis catheter R chest  Pulm: non-labored on room air  Abd: soft, non-tender, non-distended  Ext: some edema in RLE; charcot foot deformity on left; R surgical site not visualized on this date but dressing CDI.  Neuro/MSK: awake, alert, moving all extremities actively in bed      LABS  CBC RESULTS:   Recent Labs   Lab Test 04/27/24  0722 04/25/24  0540 04/20/24  1821 04/20/24  0645 04/18/24  0604 04/17/24  0636   WBC 4.3 4.4  --   --   --  8.5   RBC 2.82* 2.95*  --   --   --  2.85*   HGB 8.7* 9.0* 8.6* 7.4*   < > 8.5*   HCT 27.2* 27.7*  --   --   --  25.9*   MCV 97 94  --   --   --  91   MCH 30.9 30.5  --   --   --  29.8   MCHC 32.0 32.5  --   --   --  32.8   RDW 18.5* 18.0*  --   --   --  16.9*    282  --  170  --  225    < > = values in this interval not displayed.       Last Basic Metabolic Panel:  Recent Labs   Lab Test 04/27/24  0722 04/26/24  0210 04/25/24  0540 04/23/24  0602   *  --  126* 124*   POTASSIUM 4.5  --  4.3 4.3   CHLORIDE 93*  --  95* 92*   CO2 25 -- 24 22   ANIONGAP 9  --  7 10   GLC 80 " 87 86 74   BUN 29.0*  --  36.9* 52.7*   CR 2.67*  --  2.76* 2.83*   GFRESTIMATED 19*  --  18* 18*   SONIA 7.7*  --  7.7* 7.4*         Rehabilitation - continue comprehensive acute inpatient rehabilitation program with multidisciplinary approach including therapies, rehab nursing, and physiatry following. See interval history for updates.      ASSESSMENT AND PLAN  Shirley Hendricks is a 63 year old right hand dominant female with complicated past medical history including but not limited to PAD with multiple prior bilateral lower extremity vascular bypass grafts and thrombectomies (most recently 10/2023 right common femoral to proximal anterior tibial PTFE bypass graft), bilateral Charcot-Breonna-Tooth foot deformity, prior bilateral carotid endarterectomies, B-cell lymphoma, CAD (hx Mix3), hypertension, hyperlipidemia, GI bleed (12/2023), type 2 diabetes mellitus, COPD, tobacco use disorder, iron deficiency anemia, GERD, Celiac disease, Takotsubo cardiomyopathy, SVT, depression/anxiety and spongiotic dermatitis who was admitted on 3/29/24 with acute occlusion of right lower extremity arterial bypass graft now s/p right above-knee amputation 4/1/24, completed 4/9/24 with hospital course complicated by acute renal failure now on dialysis, sepsis, acute blood loss anemia, acute post-operative pain, nausea, loose stools, delirium, malnutrition, and multiple electrolyte derangements.  She is now admitted to ARU on 4/22/24 for multidisciplinary rehabilitation and ongoing medical management.        Admission to acute inpatient rehab 04/22/24.    Impairment group code: Amputation 05.3 Unilateral LE AKA; emergent R AKA due to acute occlusion of RLE bypass graft         PT and OT 90 minutes of each daily for 6 days per week in addition to rehab nursing and close management of physiatrist.       Impairment of ADL's: Noted to have impaired activity tolerance, impaired balance, impaired strength, impaired weight shifting, and  pain, all affecting her ability to safely and independently perform basic ADLs.  Goal for mod I with basic wheelchair-based ADLs with assist for bathing, as well as assist IADLs/heavier activities.     Impairment of mobility:  Noted to have impaired activity tolerance, impaired balance, impaired strength, impaired weight shifting, and pain, all affecting her ability to safely and independently perform basic mobility.  Goal for mod I with basic wheelchair-based mobility.     Impairment of cognition/language/swallow:  Noted to have dysphagia with goals for safe tolerance of least restrictive diet.  However, IP SLP noting did not anticipate further SLP services at ARU, no issues with tolerating regular diet.  Will not consult initially.  If any concern for difficulty tolerating current diet, can consult.     Medical Conditions  New actions/orders/updates for today are in blue.     S/p right AKA 4/1/24, completed 4/9/24 due to critical limb ischemia, acute occlusion of right common femoral artery to mid anterior tibial artery bypass graft   PAD/PVD with hx multiple prior vascular interventions to BLE  Bilateral Charcot-Breonna-Tooth foot deformities  Acute post-op pain  - Wound care: daily dressing changes to right AKA with xeroform and gauze with kerlix to keep wound protected  - Dehiscence of lateral surgical incision with some increased serosanguinous drainage.  Also with multiple areas with necrotic appearance.  Overall similar to day of ARU admission although with more drainage.  Updated photos in chart and requested vascular surgery to review.  Still waiting on response, BRANDON contacted by [employer] on 4/25 and 4/26 and staff surgeon contacted by [employer] on 4/26.  - 4/27:  discussed with Dr Markus Gonzalez vascular resident on call who recommended vascular consult [facility] (ordered). Dr Gonzalez will reach out to vascular surgery fellow to facilitate.   - Was noted to have elevated temp at 100.4F yesterday evening after return from  "HD.  Per nursing, patient had \"8\" blankets on as she feels quite cold during HD.  On reassessment, temp down to 99.0.  Remains afebrile.  - Also with significant itching at surgical site.  Recently seen by derm for dermatitis as below and recommended to trial zyrtec or claritin for itching.  Start zyrtec 5 mg daily on 4/25.  - NWB RLE  - Continue PTA L foot custom orthotic   - Continue Plavix 75 mg daily (given hx left bypass).  No ongoing need for Xarelto per vascular (no recent DVT or PE)  - Pain management: APAP 500-1000 mg q6h PRN, dilaudid 2 mg q6h PRN.  Wean opioids as able.  Is still complain of phantom pain/sensations.  Will increase gabapentin to 200 mg 3x/week after HD (renally dosed).  Recommended max of 200-300 mg post-HD per pharmacy.  Did have some sedation on 100 mg TID prior to start of HD; monitor for any recurrent sedation as increasing this dose.  - Continue PT/OT  - Follow up with vascular surgery in 2-4 weeks     Acute blood loss anemia on anemia of chronic disease, hx iron deficiency  Retroperitoneal hematoma  Recent GI bleed (hospitalized 12/2023)  Required intermittent transfusion during this admission (3/30, 3/31, 4/2, 4/6, 4/9, 4/13).  Repeat CT abdomen on 4/20 with stable right retroperitoneal hematoma; no s/o active bleed.  4/25: Hgb stable at 9.0  - Continue epo/venofer with HD per nephrology  - Trend CBC every T/Th/Sat  - Transfuse for Hgb <7     Acute renal failure on HD  Hyponatremia  Normal baseline Cr, though per nephrology, suspect some modest CKD.  LIMA this admission, up to peak Cr 4/22 on 4/3.  Likely BAILEY +/- rhabdo +/- ischemic injury with no signs of recovery and now dialysis dependent (started 4/3/24).  S/p tunneled dialysis catheter placement 4/3/24.  4/25: Cr 2.76; Na remains low at 126.  HD day.  No further recs/changes per nephrology.  - Nephrology consulted, appreciate ongoing assistance  - HD T/Th/Sat at [facility] or per nephrology recs.  Plan for OP HD at [facility] " T/Th/Sat  - Trend BMP/mag/phos on HD days  - Continue 1L fluid restriction  - Avoid NSAIDs/nephrotoxins, renally dose medications     Bilateral pleural effusion  Noted on CT abdomen 4/20 with moderate b/l pleural effusion (left>right).  Has been intermittently requiring 1-2L supplemental oxygen.  Unclear if related to volume given new renal failure on HD, also baseline COPD.  - Monitor respiratory status, supplemental O2 by NC PRN to maintain sats >88%  - Consider thoracentesis if symptomatic or worsening hypoxia      Celiac disease  Colon distension   Loose stools, improving  Nausea/vomiting, improving  Severe malnutrition in context of acute on chronic illness  Pill cam study with [facility] in 3/2024 (due to recent GI bleed), which revealed mild non-erosive red tissue in the duodenum, some atrophic type appearance that could be celiac disease.  This was followed by celiac labs on 3/20/24 (Gliadin ab and tTG IgA antibody) which were positive and thus Celiac diagnosis was established and she was recommended for gluten-free diet.  This admission, noted to have colonic distension and circumferential wall thickening of the distal colon and rectum concerning for proctocolitis on CT abdomen.  Also with loose stools, nausea.  Despite this, patient declined gluten-free diet (switched on 4/18).  Noted to have improvement in loose stools and nausea at discharge to ARU per sending team.  - Per discussion with RD, given K/phos are WNL and patient feeling restricted by renal diet, liberalized to 3g Na diet.  Will also continue to educate/encourage gluten-free diet, which patient may be willing to consider if otherwise less restricted.  RD discussed recommendations and rationale for gluten-free diet again on 4/24 but patient adamantly refused.  - Gluten free diet ordered 4/27 pt agreeable.  - C.diff testing ordered given 3x loose stools + fever previous night 4/27. If Negative, will order imodium QID PRN.   - RD consulted, appreciate  assistance  - Continue scopolamine patch for now (started 4/18), wean as able  - PRN Zofran  - Continue probiotics BID  - Monitor symptoms  - Follow up with [facility] as outpatient    Hypomagnesemia  Replaced mag IV on 4/23 with improvement to 2.1  4/25: Mag WNL at 2.3  - Continue to trend     Hypocalcemia  4/23: Ca corrects to 8.7 for hypoalbuminemia  - Per pharmacy, was on PTA calcium carbonate/vit D PTA (had run out 1 week before admission), not ordered while at hospital.  If corrected Ca remains low, reach out to nephrology to consider if resumption indicated  - Continue to trend    CAD (hx MI x3)  HTN  Hyperlipidemia  Hx Takotsubo CM  Hx SVT  Last coronary angiogram completed 10/2023.  Recovered EF (55-60%) from ECHO 11/2023.  - Continue PTA Coreg 6.25 mg BID, amlodipine 5 mg daily  - Hold PTA lisinopril 10 mg daily due to renal failure  - PTA Jardiance 10 mg daily discontinued due to renal failure  - Rosuvastatin 10 mg daily resumed on ARU admission (previously held due to concern for rhabdo).  Monitor hepatic panel in 1 week    - Monitor BP  - Was to follow up with cardiology in Feb 2024, should be scheduled after discharge    Murmur  Noted on exam this admission by attending physiatrist.  Per chart review, intermittently documented in the past with questionable/subtle murmur (though not by cardiology).  Most recent echo 11/7/23 with trace mitral regurg, trace tricuspid regurg, and moderate trileaflet aortic sclerosis.  - Folllow up with cardiology as above     Hx bilateral carotid artery stenosis s/p bilateral carotid endarterectomies  - Follow up with vascular surgery for annual carotid duplex (last done on 1/4/24 with stable stenosis of right carotid bulb)     Diabetes mellitus, type II  Per chart review.  A1c this admission 5.2%.  PTA Jardiance and gabapentin discontinued due to acute renal failure.  BG well-controlled during this admission without need for insulin.  - Monitor BG periodically with labs      B-cell lymphoma, stage VALENTIN  Followed by [facility] (Dr. Barrow).  Mild radiographic progression on CT 1/26/24.  Given asymptomatic, plans at that time for ongoing CT monitoring.  - Monitor for development of any B symptoms  - Trend CBC  - Follow up with oncology, repeat CT as planned in 7/2024     COPD  Tobacco use disorder  PTA smoking ~5 cigarettes per day  - Monitor respiratory status, supplemental O2 by NC PRN to maintain sats >88%  - Continue PTA Breo Ellipta  - Nicotine patch 14 mcg/day  - Ongoing education/resources for cessation     Adjustment disorder with mixed mood  Hx MDD/anxiety (per chart though patient denies)  Delirium, resolved  Not on medications PTA.  Patient denies any hx depression/anxiety but does note depressed mood in setting of recent AKA and significant home stressors.  - Psychology and spiritual services consulted, appreciate assist  - Monitor mood     GERD  PTA on omeprazole 20 mg daily  - Continue pepcid 20 mg q48 hours (renally dosed) given allergy to pantoprazole (formulary sub for omeprazole)     Spongiotic dermatitis  Recently seen by OP dermatology, recommended betamethasone cream BID PRN, not using regularly at hospital  - Consider resumption of topical steroids if recurrent symptoms  - Started Zyrtec 4/25 as above for itching near surgical site     Sacral wound: pressure injury (stage 2 vs 3), incontinence-associated dermatitis, moisture-associated skin damage  Assessed by [facility]OCN on 4/22 at [facility] hospital.  - WOCN consulted for ongoing follow-up at ARU  - Wound care per WOCN orders  - Pressure reduction strategies     Adjustment to disability:  Clinical psychology to eval and treat if indicated  FEN: 3g Na diet, 1000 mL fluid restriction  Bowel: incontinent, recent loose stools as above  Bladder: incontinent, monitor PVRs at admission  DVT Prophylaxis: subcutaneous heparin  GI Prophylaxis: pepcid  Code: full, confirmed on admission  Disposition: goal for home  ELOS:  target  5/13/24  Follow up Appointments on Discharge: PCP in 1-2 weeks, vascular surgery in 2 weeks, [facility], nephrology (ongoing HD), heme/onc ([facility], 7/2024)    Discussed the patient's case with my attending, Dr. Laury Cadena MD  , who agrees with the following above.     Artie Alvarado, DO  PGY-3 Physical Medicine & Rehabilitation

## 2024-04-28 ENCOUNTER — APPOINTMENT (OUTPATIENT)
Dept: PHYSICAL THERAPY | Facility: CLINIC | Age: 64
DRG: 559 | End: 2024-04-28
Attending: PHYSICAL MEDICINE & REHABILITATION
Payer: COMMERCIAL

## 2024-04-28 ENCOUNTER — APPOINTMENT (OUTPATIENT)
Dept: OCCUPATIONAL THERAPY | Facility: CLINIC | Age: 64
DRG: 559 | End: 2024-04-28
Attending: PHYSICAL MEDICINE & REHABILITATION
Payer: COMMERCIAL

## 2024-04-28 LAB
C DIFF TOX B STL QL: NEGATIVE
MAGNESIUM SERPL-MCNC: 2.1 MG/DL (ref 1.7–2.3)

## 2024-04-28 PROCEDURE — 97110 THERAPEUTIC EXERCISES: CPT | Mod: GP

## 2024-04-28 PROCEDURE — 250N000013 HC RX MED GY IP 250 OP 250 PS 637: Performed by: PHYSICIAN ASSISTANT

## 2024-04-28 PROCEDURE — 97530 THERAPEUTIC ACTIVITIES: CPT | Mod: GP

## 2024-04-28 PROCEDURE — 87493 C DIFF AMPLIFIED PROBE: CPT

## 2024-04-28 PROCEDURE — 97535 SELF CARE MNGMENT TRAINING: CPT | Mod: GO | Performed by: STUDENT IN AN ORGANIZED HEALTH CARE EDUCATION/TRAINING PROGRAM

## 2024-04-28 PROCEDURE — 97110 THERAPEUTIC EXERCISES: CPT | Mod: GO | Performed by: STUDENT IN AN ORGANIZED HEALTH CARE EDUCATION/TRAINING PROGRAM

## 2024-04-28 PROCEDURE — 99232 SBSQ HOSP IP/OBS MODERATE 35: CPT | Mod: GC | Performed by: PHYSICAL MEDICINE & REHABILITATION

## 2024-04-28 PROCEDURE — 128N000003 HC R&B REHAB

## 2024-04-28 PROCEDURE — 99233 SBSQ HOSP IP/OBS HIGH 50: CPT | Mod: 24 | Performed by: INTERNAL MEDICINE

## 2024-04-28 PROCEDURE — 36416 COLLJ CAPILLARY BLOOD SPEC: CPT | Performed by: PHYSICAL MEDICINE & REHABILITATION

## 2024-04-28 PROCEDURE — 250N000011 HC RX IP 250 OP 636: Performed by: PHYSICIAN ASSISTANT

## 2024-04-28 PROCEDURE — 83735 ASSAY OF MAGNESIUM: CPT | Performed by: PHYSICAL MEDICINE & REHABILITATION

## 2024-04-28 RX ADMIN — Medication 1 CAPSULE: at 08:00

## 2024-04-28 RX ADMIN — CLOPIDOGREL BISULFATE 75 MG: 75 TABLET ORAL at 07:55

## 2024-04-28 RX ADMIN — ROSUVASTATIN CALCIUM 10 MG: 5 TABLET, COATED ORAL at 20:41

## 2024-04-28 RX ADMIN — AMLODIPINE BESYLATE 5 MG: 5 TABLET ORAL at 08:00

## 2024-04-28 RX ADMIN — CETIRIZINE HYDROCHLORIDE 5 MG: 5 TABLET ORAL at 07:55

## 2024-04-28 RX ADMIN — HYDROMORPHONE HYDROCHLORIDE 2 MG: 2 TABLET ORAL at 10:31

## 2024-04-28 RX ADMIN — HYDROMORPHONE HYDROCHLORIDE 2 MG: 2 TABLET ORAL at 16:29

## 2024-04-28 RX ADMIN — Medication 1 CAPSULE: at 20:41

## 2024-04-28 RX ADMIN — MICONAZOLE NITRATE: 20 POWDER TOPICAL at 20:41

## 2024-04-28 RX ADMIN — HEPARIN SODIUM 5000 UNITS: 5000 INJECTION, SOLUTION INTRAVENOUS; SUBCUTANEOUS at 08:00

## 2024-04-28 RX ADMIN — Medication 1 CAPSULE: at 07:55

## 2024-04-28 RX ADMIN — HEPARIN SODIUM 5000 UNITS: 5000 INJECTION, SOLUTION INTRAVENOUS; SUBCUTANEOUS at 20:41

## 2024-04-28 RX ADMIN — NICOTINE 1 PATCH: 14 PATCH, EXTENDED RELEASE TRANSDERMAL at 07:55

## 2024-04-28 RX ADMIN — FAMOTIDINE 20 MG: 20 TABLET ORAL at 16:29

## 2024-04-28 RX ADMIN — CARVEDILOL 6.25 MG: 6.25 TABLET, FILM COATED ORAL at 17:43

## 2024-04-28 RX ADMIN — CARVEDILOL 6.25 MG: 6.25 TABLET, FILM COATED ORAL at 07:54

## 2024-04-28 ASSESSMENT — ACTIVITIES OF DAILY LIVING (ADL)
ADLS_ACUITY_SCORE: 39

## 2024-04-28 NOTE — PLAN OF CARE
Discharge Plan: home with HH PT      Precautions: fall, NWB of RLE, dialysis, sacral wound, elevate residual limb when in bed    Current Status:  Bed Mobility: ind rolling, Jose sup<>sit  Transfer: Slideboard minAx1 bed<>w/c. Golvo Ax2 to commode  Gait: unable, unsafe  Stairs: not tested  Balance: Stable dynamic sitting.     Assessment: Improved participation today. Continued bowel incontinence w/ all transfers.     Other Barriers to Discharge (DME, Family Training, etc):   DME: K3 w/c (order to be faxed 4/29), slideboard, bedrail.     Need to confirm family support.

## 2024-04-28 NOTE — PLAN OF CARE
Goal Outcome Evaluation:      Plan of Care Reviewed With: patient    Overall Patient Progress: no changeOverall Patient Progress: no change     Patient alert and oriented x4, able to make needs known and use call light. Bed alarm on for safety. Denies SOB, chest pain and lightheadedness. CVC right dressing CDI, left PIV intact. PRN dilaudid given at bedtime.   -R AKA dressing CDI, noted to refuse dressing this shift. Magnesium 1.4 replaced per protocol.   -stool specimen sent to lab pending result

## 2024-04-28 NOTE — PLAN OF CARE
Discharge Planner Post-Acute Rehab OT:     Discharge Plan: home with HH OT     Precautions: fall, NWB of RLE, dialysis, sacral wound, elevate residual limb when in bed    Current Status:  ADLs:  Mobility: min A for slide board to WC  Grooming: INDup seated  Dressing: UB: set up, LB: set up in supine  Bathing: TBD  Toileting: liko lift to commode, working on SB to commode Ax1  IADLs: Pt does most IADL including caregiving for .   Vision/Cognition: intact    Assessment: Pt reports feeling much better. SB transfers are improving and pt can SB to commode as well as partially manage her clothing. Making progress but still limited by urgency and unpredictability of bowels.  Pt more motivated today.    Other Barriers to Discharge (DME, Family Training, etc): DME, family training, pt has limited support despite living with multiple family members.

## 2024-04-28 NOTE — CONSULTS
Vascular Surgery Consultation    Shirley Hendricks MRN# [id number]   Age: 63 year old YOB: 1960     Date of Admission:  4/22/2024    Date of Consult:   04/28/24    Reason for consult: Concern for right AKA wound complication       Requesting service: Rehab medicine; requesting provider: Dr. Alvarado                   Assessment and Plan:   63-year-old female smoker with hypertension, coronary artery disease, COPD, Charcot-Breonna-Tooth disease, prior NSTEMI, multiple previous right leg bypasses, previous aortobifemoral bypass and left femoral-popliteal in situ bypass, who underwent right above-knee amputation and partial explantation of the femoral to anterior tibial artery PTFE bypass graft on 4/9/2024 after her redo right femoral to anterior tibial PTFE bypass graft failed.  Right above-knee amputation wound is assessed today and does not appear grossly infected.  Skin edges are segmentally approximated with gaps between these segments to allow for drainage as seen in previous pictures in the chart.  There is some surrounding eschar on the medial portion of the wound and a large eschar on the right anterior thigh over an old hematoma in the old [place]-AT bypass graft tunnel from which the graft was explanted through an anterior thigh incision.  This area was tightly wrapped with Ace wrap on my initial assessment today and the dressing had not been changed since Friday.      -Continue local wound care with Xeroform, gauze fluffs, and loosely wrapped Kerlix or cast padding  -Do not use Ace wrap  -I fashioned a stockinette into an over the shoulder sling to prevent the right AKA dressing from falling off.  This can be used when working with therapy to keep this dressing secured  -Change dressing daily, appreciate diligent wound care  -We will arrange follow-up in vascular surgery office in 1 month    discussed with staff, Dr. Palm.    Yamil Villagomez MD         History of Present Illness:   Ms. Hendricks  is a 63-year-old female well-known to the vascular surgery service at [facility] for her history of bilateral carotid endarterectomies, aortobifemoral bypass, left femoral to popliteal in situ bypass, open primary repair of left femoral pseudoaneurysm, and multiple right lower extremity bypass grafts. She was admitted last month with acute limb ischemia and occlusion of her right femoral to anterior tibial PTFE bypass graft.  We attempted cath directed thrombolysis but this resulted in GI bleeding and had to be stopped.  She then developed sepsis from her nonviable tissue in the right leg and underwent staged above-knee amputation, completed on 4/9/2024 with Dr. Hernandez.  She is now admitted to rehab facility on the [facility] where she is recovering well.  We are asked to evaluate the right above-knee amputation wound for possible infection after she has had several days of loose bowel movements.  She attributes these to more recent consumption of gluten and says her stools have been been improving when she is on a gluten-free diet.  C. difficile is negative.  She has no abdominal pain, chills, chest pain, or nausea.  No melena.  One-time fever of 100.4 Fahrenheit 4 days ago, otherwise afebrile.  She tells me the facility here is supposed to change her dressing daily but has not been changed since Friday.           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 clot    Post-menopausal     Reflux esophagitis 02/2004    EGD: esophagitis and medium HH    SBO (small bowel obstruction) (H) 08/10/2021    SVT (supraventricular tachycardia) (H24)     Thrombocytopenia (H24)     Uncomplicated asthma     Vitamin C deficiency 08/12/2018             Past Surgical History:     Past Surgical History:   Procedure Laterality Date    AMPUTATE LEG ABOVE KNEE N/A 4/1/2024    Procedure: AMPUTATION, ABOVE KNEE right leg with wound vac dressing, excision of anteriotibial bypass graft, closure of (previous interventional radiology) left groin incision;  Surgeon: José Luis Hernandez MD;  Location:  OR    AMPUTATE LEG ABOVE KNEE Right 4/9/2024    Procedure: COMPLETION RIGHT ABOVE KNEE AMPUTATION;  Surgeon: José Luis Hernandez MD;  Location:  OR    ANGIOGRAM      ANGIOGRAM Right 6/23/2021    Procedure: RIGHT LOWER EXTREMITY ANGIOGRAM WITH LEFT BRACHIAL ARTERY CUTDOWN;  Surgeon: José Luis Hernandez MD;  Location:  OR    BIOPSY MASS NECK Right 10/11/2023    Procedure: Right Parotid Biopsy;  Surgeon: Randal Mendoza MD;  Location:  OR    BYPASS GRAFT FEMOROPOPLITEAL Right 09/23/2020    Procedure: RIGHT FEMORAL GRAFT TO ABOVE-KNEE POPLITEAL BYPASS USING CADAVERIC SUPERFICIAL FEMORAL ARTERY;  Surgeon: Chadwick Lozano MD;  Location:  OR    BYPASS GRAFT FEMOROPOPLITEAL Right 2/15/2022    Procedure: RIGHT SUPERFICIAL FEMORAL ARTERY GRAFT TO BELOW KNEE POPLITEAL BYPASS WITH CADAVERIC CRYOLIFE  FEMORAL-POPLITEAL ARTERY SIZE: OUTER DIAMETER: 7MM - 6MM;  Surgeon: Chadwick Lozano;  Location: [facility] OR    BYPASS GRAFT FEMOROPOPLITEAL Right 5/26/2023    Procedure: RIGHT DISTAL SUPERFICIAL FEMORAL GRAFT TO ANTERIOR TIBIAL ARTERY WITH  26 CENTIMETER CADAVERIC SUPERFICIAL FEMORAL ARTERY GRAFT;  Surgeon: Chadwick Lozano MD;  Location:  OR    BYPASS GRAFT FEMOROPOPLITEAL Right 10/11/2023    Procedure: RIGHT FEMORAL TO POPLITEAL GRAFT THROMBECTOMY;  Surgeon: Chadwick Lozano MD;  Location:  OR    BYPASS GRAFT INSITU FEMOROPOPLITEAL Right 7/7/2021    Procedure: CREATION RIGHT FEMORAL TO POPLITEAL ARTERIAL BYPASS USING INSITU VEIN GRAFT;  Surgeon: José Luis Hernandez MD;  Location:  OR    CARDIAC CATHERIZATION  09/03/2009    multivessel CAD without target lesions, med mgmt indicated, preserved ef    CARPAL TUNNEL RELEASE RT/LT Right 05/20/2021    COLONOSCOPY N/A 12/12/2023    Procedure: Colonoscopy;  Surgeon: Corey Hoffman MD;  Location:  GI    COLONOSCOPY N/A 12/14/2023    Procedure: Colonoscopy;  Surgeon: Corey Hoffman MD;  Location:  GI    CV CORONARY ANGIOGRAM N/A 10/4/2023    Procedure: Coronary Angiogram;  Surgeon: Rogelio Ricks MD;  Location:  HEART CARDIAC CATH LAB    CV PCI N/A 10/4/2023    Procedure: Percutaneous Coronary Intervention;  Surgeon: Rogelio Ricks MD;  Location:  HEART CARDIAC CATH LAB    EMBOLECTOMY LOWER EXTREMITY Right 10/6/2023    Procedure: Right anterior tibial bypass with graft, Right tibial endarterectomy,thrombectomy, Right doraslis pedis thrombectomy, Anterior Tibial vein patch.;  Surgeon: Chadwick Lozano MD;  Location:  OR    ENDARTERECTOMY CAROTID Right 05/11/2016    Procedure: ENDARTERECTOMY CAROTID;  Surgeon: Chadwick Lozano MD;  Location:  OR    ENDARTERECTOMY CAROTID Left 06/08/2020    Procedure: LEFT CAROTID ENDARTERECTOMY with distal facal vein patch  and EEG;  Surgeon: Chadwick Lozano MD;  Location:  OR    ESOPHAGOSCOPY, GASTROSCOPY, DUODENOSCOPY (EGD), COMBINED N/A 12/12/2023    Procedure: Esophagoscopy, gastroscopy, duodenoscopy (EGD), combined;  Surgeon: Corey Hoffman MD;  Location:  GI    FINE  NEEDLE ASPIRATION WITHOUT IMAGING GUIDANCE Right 9/22/2023    Procedure: Fine needle aspiration without imaging guidance;  Surgeon: Kiersten Aguilera MD;  Location:  GI    FLUORESCENCE INTRAOPERATIVE VASCULAR ANGIOGRAPHY Right 12/28/2022    Procedure: RIGHT LEG ANGIOGRAM with intervention;  Surgeon: Chadwick Lozano MD;  Location:  OR    GYN SURGERY  left tube    left salpingectomy    IR ANGIOGRAM THROUGH CATHETER (ARTERIAL)  10/6/2023    IR ANGIOGRAM THROUGH CATHETER (ARTERIAL)  10/6/2023    IR ANGIOGRAM THROUGH CATHETER (ARTERIAL)  3/31/2024    IR ANGIOGRAM THROUGH CATHETER (ARTERIAL)  3/30/2024    IR CVC TUNNEL PLACEMENT > 5 YRS OF AGE  4/3/2024    IR CVC TUNNEL REVISION RIGHT  4/15/2024    IR LOWER EXTREMITY ANGIOGRAM RIGHT  05/25/2021    IR LOWER EXTREMITY ANGIOGRAM RIGHT  10/5/2023    IR LOWER EXTREMITY ANGIOGRAM RIGHT  3/29/2024    IR OR ANGIOGRAM  6/23/2021    IR OR ANGIOGRAM  12/28/2022    LAPAROSCOPIC CHOLECYSTECTOMY N/A 09/27/2017    Procedure: LAPAROSCOPIC CHOLECYSTECTOMY;  LAPAROSCOPIC CHOLECYSTECTOMY;  Surgeon: Jacoby Tapia MD;  Location: [facility]    LAPAROSCOPY DIAGNOSTIC (GENERAL) N/A 8/11/2021    Procedure: Exploratory laparoscopy,  laparoscopic lysis of adhesions, laparotomy;  Surgeon: Corina Ferreira MD;  Location: [facility]    OR ANGIOGRAM, LOWER EXTREMITY Right 10/11/2023    Procedure: Or Angiogram, Lower Extremity;  Surgeon: Chadwick Lozano MD;  Location:  OR    ORTHOPEDIC SURGERY      left knee surgery    REPAIR ANEURYSM FEMORAL ARTERY Left 12/28/2022    Procedure: REPAIR LEFT FEMORAL PSEUDOANEURYSM;  Surgeon: Chadwick Lozano MD;  Location:  OR    VASCULAR SURGERY  aoto bi fem  left fem-AK pop    ZZC FABRIC WRAPPING OF ABDOMINAL ANEURYSM      ZZC NONSPECIFIC PROCEDURE  12/2000    angioplasty with stent right fem. a.    ZZC NONSPECIFIC PROCEDURE  1987    sinus surgery    ZZC NONSPECIFIC PROCEDURE  09/02/2009    Emergent left groin exploration with  oversewing of bleeding angiographic site. 2. Endarterectomy of common femoral-proximal superficial femoral artery with greater saphenous vein patch angioplasty.   a. [place] of accessory right greater saphenous vein.     [facility] NONSPECIFIC PROCEDURE  08/27/2009    occluded left common iliac and external iliac arteries were successfully revascularized with stenting to 8 and 7 mm              Social History:     Social History     Tobacco Use    Smoking status: Some Days     Current packs/day: 0.25     Average packs/day: 0.3 packs/day for 56.3 years (14.1 ttl pk-yrs)     Types: Cigarettes     Start date: 1968    Smokeless tobacco: Never    Tobacco comments:     1/2 PPD   Substance Use Topics    Alcohol use: Not Currently     Comment: x1-2 yr             Family History:     Family History   Problem Relation Age of Onset    Cancer Mother         bladder    Cardiovascular Father         alive,multiple heart attacks    Diabetes Maternal Grandmother     Lung Cancer Maternal Grandmother     Blood Disease Brother         clotting disorder                Allergies:     Allergies   Allergen Reactions    Pantoprazole      Protonix caused diffuse edema    Chantix [Varenicline]      Terrible dreams    Contrast Dye Swelling     Patient reports facial and throat swelling with prior CT contrast.    Penicillins Itching and Rash             Medications:     Current Facility-Administered Medications   Medication Dose Route Frequency Provider Last Rate Last Admin    acetaminophen (TYLENOL) tablet 500-1,000 mg  500-1,000 mg Oral Q6H PRN Corrina Hurt PA-C   1,000 mg at 04/25/24 1743    amLODIPine (NORVASC) tablet 5 mg  5 mg Oral Daily Corrina Hurt PA-C   5 mg at 04/28/24 0800    carvedilol (COREG) tablet 6.25 mg  6.25 mg Oral BID w/meals Corrina Hurt PA-C   6.25 mg at 04/28/24 0754    cetirizine (zyrTEC) tablet 5 mg  5 mg Oral Daily Corrina Hurt PA-C   5 mg at 04/28/24 0755    clopidogrel (PLAVIX) tablet 75 mg   75 mg Oral Daily Corrina Hurt PA-C   75 mg at 04/28/24 0755    diphenoxylate-atropine (LOMOTIL) 2.5-0.025 MG per tablet 1 tablet  1 tablet Oral 4x Daily PRN Corrina Hurt PA-C   1 tablet at 04/25/24 1941    famotidine (PEPCID) tablet 20 mg  20 mg Oral Q48H Corrina Hurt PA-C   20 mg at 04/28/24 1629    fluticasone-vilanterol (BREO ELLIPTA) 200-25 MCG/ACT inhaler 1 puff  1 puff Inhalation Daily Corrina Hurt PA-C   1 puff at 04/26/24 0932    gabapentin (NEURONTIN) capsule 200 mg  200 mg Oral Once per day on Tuesday Thursday Saturday Corrina Hurt PA-C   200 mg at 04/27/24 2027    heparin ANTICOAGULANT injection 5,000 Units  5,000 Units Subcutaneous Q12H Corrina Hurt PA-C   5,000 Units at 04/28/24 0800    HYDROmorphone (DILAUDID) tablet 2 mg  2 mg Oral Q6H PRN Corrina Hurt PA-C   2 mg at 04/28/24 1629    lactobacillus rhamnosus (GG) (CULTURELL) capsule 1 capsule  1 capsule Oral BID Corrina Hurt PA-C   1 capsule at 04/28/24 0800    miconazole (MICATIN) 2 % powder   Topical BID Corrina Hurt PA-C   Given at 04/27/24 2028    multivitamin RENAL (TRIPHROCAPS) capsule 1 capsule  1 capsule Oral Daily Corrina Hurt PA-C   1 capsule at 04/28/24 0755    naloxone (NARCAN) injection 0.2 mg  0.2 mg Intravenous Q2 Min PRN Arun Pimentel MD        Or    naloxone (NARCAN) injection 0.4 mg  0.4 mg Intravenous Q2 Min PRN Arun Pimentel MD        Or    naloxone (NARCAN) injection 0.2 mg  0.2 mg Intramuscular Q2 Min PRN Arun Pimentel MD        Or    naloxone (NARCAN) injection 0.4 mg  0.4 mg Intramuscular Q2 Min PRN Arun Pimentel MD        nicotine (NICODERM CQ) 14 MG/24HR 24 hr patch 1 patch  1 patch Transdermal Daily Corrina Hurt PA-C   1 patch at 04/28/24 0755    nitroGLYcerin (NITROSTAT) sublingual tablet 0.4 mg  0.4 mg Sublingual Q5 Min PRN Corrina Hurt PA-C        ondansetron (ZOFRAN ODT) ODT tab 4 mg  4 mg Oral Q6H PRN Corrina Hurt PA-C   4 mg  "at 04/27/24 2008    polyethylene glycol (MIRALAX) powder 17 g  17 g Oral Daily PRN Corrina Hurt PA-C        rosuvastatin (CRESTOR) tablet 10 mg  10 mg Oral At Bedtime Corrina Hurt PA-C   10 mg at 04/27/24 2027    scopolamine (TRANSDERM) 72 hr patch 1 patch  1 patch Transdermal Q72H Corrina Hurt PA-C   1 patch at 04/26/24 1215    And    scopolamine (TRANSDERM-SCOP) Patch in Place   Transdermal Q8H Corrina Hurt PA-C        senna-docusate (SENOKOT-S/PERICOLACE) 8.6-50 MG per tablet 1-2 tablet  1-2 tablet Oral BID PRN Corrina Hurt PA-C                   Review of Systems:   A 12 point review of systems was completed and found to be negative unless otherwise stated in the above HPI          Physical Exam:   BP (!) 145/62 (BP Location: Left arm)   Pulse 67   Temp 98.1  F (36.7  C) (Oral)   Resp 16   Ht 1.549 m (5' 0.98\")   Wt 51.8 kg (114 lb 3.2 oz)   LMP  (LMP Unknown)   SpO2 92%   BMI 21.59 kg/m        Intake/Output Summary (Last 24 hours) at 4/28/2024 1939  Last data filed at 4/27/2024 2016  Gross per 24 hour   Intake 350 ml   Output --   Net 350 ml       GEN: A&Ox3, no acute distress  NEURO: CN II-XII grossly intact. No gross neurologic deficits  NECK: trachea midline, no JVD  HEENT: No scleral icterus.  RESP: Nonlabored breathing on room air, no cough  CV: RRR by radial pulse, noncyanotic   ABD: soft, non-tender, nondistended. No guarding or rebound tenderness  MSK: Moves all extremities independently. Normal active range of motion.  PSYCH: cooperative   Right above-knee amputation wound is well-approximated with several gaps in the skin closure, unchanged from previous.  There is small amount of serous drainage and fibrinous tissue at the base, no induration or obvious signs of infection  There is a large eschar on the right anterior thigh where there is been an Ace wrap wrapped tightly around her leg.                        Data:   All laboratory data " reviewed    Results:  BMP  Recent Labs   Lab 04/27/24  0722 04/26/24  0210 04/25/24  0540 04/23/24  0602 04/22/24  0553   *  --  126* 124*  --    POTASSIUM 4.5  --  4.3 4.3 4.2   CHLORIDE 93*  --  95* 92*  --    CO2 25  --  24 22  --    BUN 29.0*  --  36.9* 52.7*  --    CR 2.67*  --  2.76* 2.83*  --    GLC 80 87 86 74  --      CBC  Recent Labs   Lab 04/27/24  0722 04/25/24  0540   WBC 4.3 4.4   HGB 8.7* 9.0*    282     LFT  Recent Labs   Lab 04/23/24  0602   AST 16   ALT 20   ALKPHOS 96   BILITOTAL 0.5   ALBUMIN 1.9*     Recent Labs   Lab 04/27/24  0722 04/26/24  0210 04/25/24  0540 04/23/24  0602   GLC 80 87 86 74       Imaging:  CT Abdomen Pelvis w/o Contrast    Result Date: 4/20/2024  EXAM: CT ABDOMEN PELVIS W/O CONTRAST LOCATION: [facility] DATE: 4/20/2024 INDICATION: follow up retroperitoneal hematoma COMPARISON: CT abdomen pelvis 04/03/2024. TECHNIQUE: CT scan of the abdomen and pelvis was performed without IV contrast. Multiplanar reformats were obtained. Dose reduction techniques were used. CONTRAST: None. FINDINGS: LOWER CHEST: Moderate size bilateral pleural effusions are increased. Dense consolidation of the left lower lobe. Near complete resolution of previously seen patchy airspace opacities of the bilateral lung bases with mild residual patchy opacity in the right middle lobe. HEPATOBILIARY: Cholecystectomy. Dilatation of the extrahepatic bile duct appears mildly increased. PANCREAS: Normal. SPLEEN: Normal. ADRENAL GLANDS: Similar thickening of the left greater than right adrenal glands. KIDNEYS/BLADDER: Similar asymmetric atrophy of the left kidney. No hydronephrosis. Heterogeneous right renal parenchyma with small amount of retained contrast within the cortex of the right kidney which can be seen with renal dysfunction. Diffuse bladder  wall thickening. BOWEL: Limited evaluation of bowel wall in the absence of intravenous contrast. However, there does appear to  be circumferential wall thickening involving the distal descending colon and sigmoid colon and rectum. This moderate burden of stool throughout the colon. Moderate size right retroperitoneal hemorrhage appears unchanged. Similar presacral stranding. LYMPH NODES: Normal. VASCULATURE: Heavy burden of vascular calcifications. Aortobiiliac bypass iliac stents and right external iliac/common femoral stent. Partially imaged right lower extremity bypass graft. Ectasia of the abdominal aorta measuring up to 2.7 cm in diameter, similar. PELVIC ORGANS: No pelvic masses. MUSCULOSKELETAL: Anasarca. Small amount of subcutaneous gas of the left lower abdominal wall may be secondary to medication injection. Degenerative changes of the spine.     IMPRESSION: 1.  Moderate size right retroperitoneal hemorrhage appears unchanged. 2.  Circumferential wall thickening of the distal colon and rectum concerning for proctocolitis. 3.  Circumferential wall thickening of the urinary bladder with surrounding stranding concerning for cystitis. Heterogeneous appearance of the right kidney is favored secondary to retained contrast in the setting of renal dysfunction with pyelonephritis not excluded in the correct clinical context. 4.  Moderate size bilateral pleural effusions, left greater than right, have increased. 5.  Increased dense consolidation of the left lower lobe which may represent atelectasis and/or pneumonia. 6.  Mildly increased dilatation of the extrahepatic bile ducts, nonspecific in a postcholecystectomy patient but can be correlated with laboratory values. 7.  Anasarca.     IR Angiogram through catheter (arterial)    Result Date: 4/15/2024  PROCEDURE: 1. Right lower extremity angiogram. 2. Placement of a 8 x 50 mm Viabahn covered stent into external iliac/common femoral artery bypass graft, with post stent placement angiography. 3. Angioplasty throughout the stent with a 9 x 40 mm balloon with post angioplasty angiography. 4.  Selective catheterization and angiography of a circumflex femoral artery. 5. Coil embolization of a branch of the circumflex femoral artery with post embolization angiography. 6. Angioplasty of the proximal bypass stenosis with post angioplasty angiography. 7. Angioplasty of the distal bypass anastomosis with post angioplasty angiography. 8. Reinitiation of catheter directed lytics. DATE OF PROCEDURE: 3/30/2024 MODERATE SEDATION: Versed 2 mg IV and 25 mcg fentanyl IV.  Under physician supervision, Versed and fentanyl were administered for moderate sedation. Pulse oximetry, heart rate and blood pressure were continuously monitored by an independent trained observer. The physician spent 147 minutes of face-to-face sedation time with the patient. CONTRAST: 96 mL Visipaque IA FLUOROSCOPY TIME: 27.7 minutes AIR KERMA: 745.67 mGy. CLINICAL HISTORY/INDICATION: Follow up right lower extremity angiogram status post initiation of TPA. Critical limb ischemia. Occlusion of the right femoral to anterior tibial artery bypass. Catheter directed thrombolytic initiated on previous day. Large right groin hematoma in the setting of thrombolytic, concern for active extravasation. PROCEDURE AND FINDINGS: Following a discussion of the risks, benefits, indications, and alternatives to treatment, appropriate informed consent was obtained from the patient. The patient was brought to the interventional radiology suite and placed supine on the table. Bilateral groins were prepped and draped in a sterile fashion.  A timeout was performed per universal protocol policy to confirm the correct patient, site and procedure to be performed. Preliminary fluoroscopic images were obtained demonstrating the indwelling infusion catheter to be unchanged in position from the previous day. The infusion catheter was removed over a guidewire and a digital subtraction angiogram of the right lower extremity was performed via the vascular sheath. This  demonstrated the bypass is patent with thrombus at the level of the proximal calf and distal bypass. No outflow is noted. At this point the long 6 [place] vascular sheath was exchanged over the wire for a long 7 [place] sheath which was advanced into the left common femoral artery. Mechanical thrombectomy was performed with a 6 [place] AngioJet. Post images show no residual thrombus in the bypass. Again there is no outflow to the tibial vessels. Attention was then turned to the common femoral artery. Digital subtraction angiography was performed which shows extravasation which appears to be off the bypass. Images were obtained in multiple projections. Over the wire an 8 x 50 mm Viabahn covered stent was advanced across the area of active extravasation and deployed under fluoroscopic guidance. Post stent placement angiography was performed, images show decreased active extravasation. Angioplasty was then performed throughout the stent with a 9 x 40 mm balloon. Post angioplasty angiography shows persistent active extravasation. Using a combination of catheter and wire access was gained into a circumflex femoral artery. Digital subtraction angiography was performed, images show active extravasation off this vessel. A Progreat microcatheter and wire were used to selectively catheterize the bleeding vessel. Digital subtraction angiography was performed. Images show active extravasation. Gelfoam and coil embolization was performed. Postembolization angiography shows no active extravasation. Attention was then turned to the bypass. Sluggish flow is noted throughout the bypass. There was a question if there was stenosis of the proximal anastomosis. Angioplasty was performed across the proximal anastomosis with an 8 x 40 mm balloon. Post angioplasty angiography shows persistent sluggish flow throughout the bypass. Using a combination of catheter and wire access was gained across the distal anastomosis. Angioplasty was performed  with a 2 x 20 mm balloon. Post images showed no significant outflow, which is unchanged. At this point a 50 cm infusion length infusion catheter was placed with its proximal infusion port within the bypass in the proximal thigh artery and its distal infusion port located in the anterior tibial artery. Fluoroscopic spot images were stored to document infusion port location. At this point, the sheath and catheter were secured in place. A sterile dressing was applied in used to cover the external portions of the infusion catheter and sheath. TPA infusion via the infusion catheter was then restarted at a rate of 0.25 mg/hr. A heparin infusion was restarted through the vascular sheath at a rate of 500 units/hr. Throughout the procedure, the patient was monitored by a radiology nurse for cardiac rhythm and oxygen saturation which remained stable. The patient tolerated the procedure well and left interventional radiology in stable condition.     IMPRESSION: 1. Active extravasation in the right groin. Placement of a 8 x 50 mm Viabahn covered stent with post dilatation with a 9 x 40 mm balloon. There was persistent active extravasation post stent placement. 2. Coil embolization of actively bleeding right circumflex iliac branch vessel. Postembolization shows no residual bleeding. 3. Mechanical thrombectomy throughout the right lower extremity femoral to anterior tibial artery bypass. 4. Angioplasty of the proximal and distal anastomosis. 5. Catheter directed thrombolytic continuation with the distal ports across the distal anastomosis. Plan: The patient will be monitored in the surgical ICU overnight. The patient will have the infusion continue during this time period with close monitoring for thrombolytic complications.  The patient will return to IR on subsequent day for additional angiogram to evaluate for lysis progression. GHASSAN COLLAZO DO   SYSTEM ID:  [id number]    IR Angiogram through catheter (arterial)    Result  Date: 4/15/2024  PROCEDURE: Right lower extremity angiogram DATE OF PROCEDURE: 3/31/2024 MODERATE SEDATION: Versed 2 mg IV.  Under physician supervision, Versed and fentanyl were administered for moderate sedation. Pulse oximetry, heart rate and blood pressure were continuously monitored by an independent trained observer. The physician spent 16 minutes of face-to-face sedation time with the patient. CONTRAST: 30 mL Visipaque IA FLUOROSCOPY TIME: 1.1 minutes AIR KERMA: 39 mGy. COMPLICATIONS: None CLINICAL HISTORY/INDICATION: Follow up right lower extremity angiogram status post initiation of TPA. Critical limb ischemia. Occlusion of the right femoral to anterior tibial artery bypass. Catheter directed thrombolytic initiated on previous day PROCEDURE AND FINDINGS: Following a discussion of the risks, benefits, indications, and alternatives to treatment, appropriate informed consent was obtained from the patient. The patient was brought to the interventional radiology suite and placed supine on the table. Bilateral groins were prepped and draped in a sterile fashion.  A timeout was performed per universal protocol policy to confirm the correct patient, site and procedure to be performed. Preliminary fluoroscopic images were obtained demonstrating the indwelling infusion catheter to be unchanged in position from the previous day. The infusion catheter was removed over a guidewire and a digital subtraction angiogram of the right lower extremity was performed via the vascular sheath. Images show sluggish flow throughout the proximal bypass. No flow is noted throughout the mid and distal bypass. No runoff. No active extravasation in the right groin at the previously coiled site. Decision was made to terminate the procedure due to no outflow and no significant improvement in the bypass. All catheters and wires were removed. The groin was reprepped. The groin was closed with an 8 Taiwanese Angio-Seal closure device. Post  closure device patient had a good femoral pulse. Throughout the procedure, the patient was monitored by a radiology nurse for cardiac rhythm and oxygen saturation which remained stable. The patient tolerated the procedure well and left interventional radiology in stable condition.     IMPRESSION: Poor flow throughout the proximal bypass, minimal to no flow in the mid/distal bypass. Given how patient was doing clinically a discussion was had with vascular surgery and the decision was made to discontinue catheter directed thrombolysis. GHASSAN COLLAZO DO   SYSTEM ID:  [id number]    IR CVC Tunnel Revision Right    Result Date: 4/15/2024  IR CVC TUNNEL REVISION RIGHT , DISRUPTION OF FIBRIN SHEATH UTILIZING ANGIOPLASTY BALLOON 4/15/2024 10:44 AM HISTORY: Malfunctioning/thrombosed tunneled central venous catheter. COMPARISON: None. DESCRIPTION OF PROCEDURE: After obtaining informed consent, the patient was placed in a supine position on the fluoroscopy table. The neck, chest, and external portions of a tunneled central venous catheter were prepped and draped in the usual sterile manner. 1% lidocaine was injected for local anesthesia. A stiff Glidewire was passed through one of the lumens of the tunneled catheter and maneuvered into the inferior vena cava. The catheter was then pulled and removed over the wire. A new tunneled central venous catheter was then passed over the wire, through the internal jugular vein and into the right atrium. The tip of the catheter was positioned in the mid right atrium. The ports were aspirated. There was difficulty aspirating from one of the ports. It was felt that this was likely due to a residual fibrin sheath. Therefore, the catheter was removed over wire. An 8 mm balloon was then passed over the wire and used to perform angioplasty from the right atrium into the SVC to disrupt possible fibrin sheath. Subsequently, the catheter was then replaced over the wire and the tip was positioned  in the mid to lower right atrium. Both ports were able to be aspirated successfully this time. The ports were then flushed with saline, and heparin locked. A fluoroscopic image was obtained to document catheter tip position. The catheter was sutured to the patient's skin using 2-0 Ethilon. I determined this patient to be an appropriate candidate for the planned sedation and procedure and reassessed the patient immediately prior to sedation and procedure. Moderate intravenous conscious sedation was supervised by me. The patient was independently monitored by a registered nurse assigned to the Department of radiology using automated blood pressure, EKG and pulse oximetry. The patient tolerated the procedure well. There were no immediate postprocedure complications. The patient's vital signs were monitored by radiology nursing staff under my supervision and remained stable throughout the study. Radiation dose for this scan was reduced using automated exposure control, adjustment of the mA and/or kV according to patient size, or iterative reconstruction technique. MEDICATIONS: 1 mg Versed, 50 mg fentanyl SEDATION TIME: 22 minutes FLUOROSCOPY TIME: 1.2 minutes FLUOROSCOPIC DOSE: 6.99 mGy Air Kerma NUMBER OF FLUOROSCOPIC IMAGES: 6     IMPRESSION: Tunneled central venous catheter placed as above. Maximal Sterile Barrier Technique Utilized: Cap AND mask AND sterile gown AND sterile gloves AND sterile full body drape AND hand hygiene AND skin preparation 2% chlorhexidine for cutaneous antisepsis (or acceptable alternative antiseptics).   Sterile Ultrasound Technique Utilized ?Sterile gel AND sterile probe covers. ALBERTO BENITEZ MD   SYSTEM ID:  [id number]    XR Abdomen Port 1 View    Result Date: 4/5/2024  EXAM: XR ABDOMEN PORT 1 VIEW LOCATION: [facility] DATE: 4/5/2024 INDICATION: Eval for obstruction COMPARISON: CT or 03/20/2024     IMPRESSION: Nonobstructive bowel gas pattern. Gaseous distention  of the colon. Enteric feeding tube tip in the fourth portion of the duodenum. Iliac stents, cholecystectomy clips, and endoscopic clips in the right hemiabdomen.    IR CVC Tunnel Placement > 5 Yrs of Age    Result Date: 4/4/2024  TUNNELED CATHETER PLACEMENT  4/3/2024 10:10 AM INDICATION:  Chronic intravenous access.  Renal failure with significant volume overload. LOCATION:  Right internal jugular vein. PROCEDURE: Ultrasound guidance:  Ultrasound was used to localize and document patency of the internal jugular vein.  A permanent image of the vein was recorded.  Maximal Sterile Barrier Technique Utilized: Cap AND mask AND sterile gown AND sterile gloves AND sterile full body drape AND hand hygiene AND skin preparation 2% chlorhexidine for cutaneous antisepsis (or acceptable alternative antiseptics).  Sterile Ultrasound Technique Utilized ?Sterile gel AND sterile probe covers. Local anesthesia was administered to the skin over the vein and a 4 mm incision was made.  Ultrasound was used to guide placement of a 5F dilator in the vein using standard technique.  Lidocaine was then administered in the subcutaneous tissue over the clavicle to a point about 5 cm below the mid clavicle.  A short incision was made at this point.  A 19 cm catheter was then brought through the tract between the two incisions and inserted into the jugular vein through a peel away sheath. The catheter was secured in the subclavian region with two interrupted stitches of 2-0 polypropylene.  The neck incision was closed with Dermabond adhesive. Complications:  None Sedation: A moderate level of sedation was achieved with 1 mg midazolam.                 25 mcg fentanyl. Sedation time: 15 minutes. Vital signs and sedation monitored by our nursing staff under my supervision. Fluoroscopy time:  0.7 minutes. Air kerma: 2.4 mGy Contrast given:  None Local anesthetic:  20 mL of 1% lidocaine FINDINGS:  Fluoroscopic guidance with a permanent image confirmed  the catheter tip location in the right atrium. This was confirmed with single spot fluoroscopic image.     IMPRESSION: Successful placement of 19 cm Palindrome tunneled hemodialysis catheter. The catheter is ready for immediate use. NICOLAS HOPSON MD   SYSTEM ID:  [id number]    CT Abdomen Pelvis w/o Contrast    Result Date: 4/3/2024  EXAM: CT ABDOMEN PELVIS W/O CONTRAST LOCATION: [facility] DATE: 4/3/2024 INDICATION: abdominal pain COMPARISON: CT chest, abdomen, pelvis from 01/26/2024. Lumbar spine CT from 03/30/2024 TECHNIQUE: CT scan of the abdomen and pelvis was performed without IV contrast. Multiplanar reformats were obtained. Dose reduction techniques were used. CONTRAST: None. FINDINGS: Image quality is moderately degraded by motion artifact limiting evaluation. LOWER CHEST: Small bilateral pleural effusions. Extensive pulmonary opacities. Extensive coronary atherosclerosis. Partial visualized central line terminating at the cavoatrial junction. HEPATOBILIARY: Cholecystectomy. PANCREAS: Normal. SPLEEN: Normal. ADRENAL GLANDS: Nodular thickening of the adrenal glands. KIDNEYS/BLADDER: There is heterogeneous patchy retention of contrast in the right kidney indicative of renal dysfunction. The left kidney is moderately atrophic. The bladder is decompressed with Alvarez catheter. Contrast seen within the bladder. No hydronephrosis. BOWEL: Mild fluid-filled distention of the small and large bowel. No obstruction. There is enteric contrast in the colon. Feeding tube terminates in the proximal jejunum. The stomach is incompletely distended accentuating wall thickening. Small volume ascites. Some of the ascites is mildly complex. In addition, there is a retroperitoneal and extraperitoneal hematoma beginning in the right lower quadrant extending along the right retroperitoneum and in the space of Retzius. LYMPH NODES: Normal. VASCULATURE: Extensive atherosclerosis. Aortobiiliac bypass with iliac  stents, right external iliac/common femoral stent, and partial visualized right lower extremity bypass graft. PELVIC ORGANS: Unremarkable. MUSCULOSKELETAL: Marked diffuse anasarca. Small amount of edema and/or blood products along the bilateral inguinal fossa.     IMPRESSION: 1.  Medium-sized right retroperitoneal and extraperitoneal hematoma. Evaluation for extravasation is unable to be performed without contrast. This is likely similar to slightly smaller than the CT lumbar spine although this is incompletely visualized at that time 2.  Extensive pulmonary opacities. Differential: Multifocal infection, ARDS, and/or pulmonary edema. 3.  Manifestations of third spacing and ascites, or anasarca, and small bilateral effusions. 4.  Fluid-filled distention of the small large bowel can be seen in diarrheal state. No obstruction. 5.  Retention of contrast in the right kidney indicative of renal dysfunction. Findings discussed with Dr. Law at 2119 hours.      XR Abdomen Port 1 View    Result Date: 4/3/2024  EXAM: XR ABDOMEN PORT 1 VIEW LOCATION: [facility] [place] DATE: 4/3/2024 INDICATION: Verify small bowel feeding tube bedside placement COMPARISON: None.     IMPRESSION: Feeding tube tip located in the third portion of the duodenum. Numerous gas distended loops of small bowel and colon. No free air. Clips right upper quadrant from cholecystectomy. Left and right common iliac artery stents.     US Upper Ext Arterial Duplex Bilateral    Result Date: 4/3/2024  EXAM: US UPPER EXTREMITY ARTERIAL DUPLEX BILATERAL LOCATION: [facility] [place] DATE: 4/3/2024 INDICATION: Baseline exam given bilaterally nonpalp radial pulses COMPARISON: None. TECHNIQUE: Arterial Duplex ultrasound of the bilateral arms. Color flow and spectral Doppler with waveform analysis performed. FINDINGS (RIGHT upper extremity): ARTERIAL PEAK SYSTOLIC VELOCITIES (cm/s): Subclavian artery: 148 Axillary artery: 105  Brachial (proximal): 141 Brachial (distal): 128 Radial: 34 Ulnar: 68 WAVEFORMS/COLOR DOPPLER: Monophasic in the radial artery. Biphasic in the remainder of the arm. FINDINGS (LEFT upper extremity): ARTERIAL PEAK SYSTOLIC VELOCITIES (cm/s): Subclavian artery: 191 Axillary artery: 64 Brachial (proximal): 130 Brachial (distal): 98 Radial: 107 Ulnar: 97 WAVEFORMS/COLOR DOPPLER: Biphasic and triphasic waveforms throughout the arm.     IMPRESSION: 1.  Right radial artery decreased flow velocity and monophasic waveforms. All vessels are patent. 2.  Normal left arm Doppler.    XR Chest Port 1 View    Result Date: 4/2/2024  XR CHEST PORT 1 VIEW 4/2/2024 8:40 AM    INDICATION: Sob COMPARISON: 3/31/2024     IMPRESSION: New bilateral perihilar infiltrates most likely representing pulmonary edema. Right PICC line in good position in the low SVC. Stable elevation of the right hemidiaphragm. RAGINI DEVINE MD   SYSTEM ID:  [id number]    XR Chest Port 1 View    Result Date: 3/31/2024  EXAM: XR CHEST PORT 1 VIEW LOCATION: [facility] DATE: 3/31/2024 INDICATION: RRT, hypoxia COMPARISON: 12/10/2023     IMPRESSION: Heart is normal in size. Elevation of the right hemidiaphragm. Lungs otherwise appear clear. Right PICC line tip in the distal SVC.    US Renal Complete Non-Vascular    Result Date: 3/31/2024  EXAM: US RENAL COMPLETE NON-VASCULAR LOCATION: [facility] DATE: 3/31/2024 INDICATION: ARF COMPARISON: None. TECHNIQUE: Routine Bilateral Renal and Bladder Ultrasound. FINDINGS: RIGHT KIDNEY: 10.9 x 5.1 x 5.1 cm. Normal parenchyma, without hydronephrosis or masses. Trace perinephric fluid. LEFT KIDNEY: 6.3 x 3.6 x 4.0 cm. Atrophic without hydronephrosis or masses. BLADDER: Decompressed secondary to catheter.     IMPRESSION: 1.  No obstruction demonstrated.    CT Lumbar Spine w/o Contrast    Result Date: 3/30/2024  EXAM: CT LUMBAR SPINE W/O CONTRAST LOCATION: [facility]  HOSPITAL DATE: 3/30/2024 INDICATION: Severe LE weakness COMPARISON: None. TECHNIQUE: Routine CT Lumbar Spine without IV contrast. Multiplanar reformats. Dose reduction techniques were used. FINDINGS: VERTEBRA: Normal vertebral body heights and alignment. No fracture or posttraumatic subluxation. CANAL/FORAMINA: No canal or neural foraminal stenosis. PARASPINAL: Atrophic left kidney. Retained contrast within the right kidney is concerning for persistent or delayed nephrogram, and can be seen in the setting of obstructive uropathy, renal vein thrombosis or renal artery stenosis. Extensive atherosclerotic vascular disease of the aorta and vascular stents are noted. There is soft tissue stranding in the right lower quadrant which includes a hyperattenuating right lower quadrant fluid collection which is incompletely imaged and concerning for retroperitoneal hematoma.     IMPRESSION: 1.  No fracture or posttraumatic subluxation. 2.  No high-grade spinal canal or neural foraminal stenosis. 3.  Incompletely imaged probable retroperitoneal hematoma on the right. 4.  Persistent enhancement of the right kidney concerning for delayed nephrogram which is seen in the setting of obstructive uropathy, renal vein thrombosis, or renal artery stenosis. These findings were discussed with  at 7:18 PM CST on 03/30/2024.    Us Post Vascular Access Low Ext Duplex    Result Date: 3/30/2024  EXAM: ULTRASOUND RIGHT LOWER EXTREMITY LIMITED WITH DOPPLER LOCATION: [facility] DATE/TIME: 03/30/2024, 4:42 AM CDT INDICATION: History of aortobifemoral bypass and right femoral to popliteal bypass. Status post attempted vascular access in the right inguinal region on 03/29/2024. Right groin swelling. Thrombolytic catheter in place in the left common femoral artery. COMPARISON: 03/29/2024. TECHNIQUE: Focused ultrasound scanning was performed by the ultrasound technologist of the right inguinal region. Spectral  waveform and color Doppler evaluation were performed. FINDINGS: Blood flow with appropriate waveforms are present within the right common and external iliac arteries and common femoral vein. A large heterogeneous mass-like structure with containing hypo and hyperechoic areas is present in the soft tissues of the right inguinal region, measuring 10.0 x 5.3 x 5.0 cm. No blood flow is visualized in this structure.     IMPRESSION: 1.  Large 10 x 5 x 5 cm heterogeneous mass-like structure in the soft tissues the right inguinal region. This is nonspecific, but given the clinical history, likely represents a large hematoma or thrombosed pseudoaneurysm. 2.  No patent pseudoaneurysm is visualized in the right inguinal region.     CT Head w/o Contrast    Result Date: 3/30/2024  EXAM: CT HEAD W/O CONTRAST LOCATION: [facility] DATE: 3/30/2024 INDICATION: Eval htn and pounding headache COMPARISON: MRI brain 09/18/2019 TECHNIQUE: Routine CT Head without IV contrast. Multiplanar reformats. Dose reduction techniques were used. FINDINGS: INTRACRANIAL CONTENTS: No intracranial hemorrhage, extraaxial collection, or mass effect.  No CT evidence of acute infarct. Stable posttraumatic encephalomalacia in the anterior/inferior frontal lobes and left temporal tip. Normal ventricles and sulci. VISUALIZED ORBITS/SINUSES/MASTOIDS: No intraorbital abnormality. No paranasal sinus mucosal disease. No middle ear or mastoid effusion. BONES/SOFT TISSUES: No acute abnormality.     IMPRESSION: 1.  No acute intracranial process. 2.  Stable posttraumatic encephalomalacia in the anterior/inferior frontal lobes and left temporal tip.     IR Lower Extremity Angiogram Right    Result Date: 3/30/2024  [facility] LOCATION: [facility] DATE: 3/29/2024 PROCEDURE: RIGHT LOWER EXTREMITY ANGIOGRAM AND INITIATION OF CATHETER DIRECTED THROMBOLYTIC THERAPY INTERVENTIONAL RADIOLOGIST: Héctor Alba MD.  INDICATION: Acute right limb ischemia with thrombosed right common femoral to below-knee bypass graft.. CONSENT: The risks, benefits and alternatives of right lower extremity angiogram with possible additional intervention were discussed with the patient  in detail. All questions were answered. Informed consent was given to proceed with the procedure. MODERATE SEDATION: Versed 1.5 mg IV; Fentanyl 100 mcg IV.  Under physician supervision, Versed and fentanyl were administered for moderate sedation. Pulse oximetry, heart rate and blood pressure were continuously monitored by an independent trained observer. The physician spent 75 minutes of face-to-face sedation time with the patient. During the timeout, immediately prior to administration of medications, the patient was reassessed for adequacy to receive conscious sedation. CONTRAST: 60 mL of Isovue-300 ANTIBIOTICS: None. ADDITIONAL MEDICATIONS: None. FLUOROSCOPIC TIME: 15.8 minutes. RADIATION DOSE: Air Kerma: 66 mGy. COMPLICATIONS: No immediate complications. STERILE BARRIER TECHNIQUE: Maximum sterile barrier technique was used. Cutaneous antisepsis was performed at the operative site with application of 2% chlorhexidine and large sterile drape. Prior to the procedure, the  and assistant performed hand hygiene and wore hat, mask, sterile gown, and sterile gloves during the entire procedure. PROCEDURE:  Ultrasound was used to evaluate the bilateral groins and saved ultrasound images were obtained of patent bilateral distal segments of the aortobifemoral bypass limbs. Initial attempt at antegrade access of the right common femoral artery was not successful given there was not enough length for wire purchase. Therefore this was abandoned. The left groin was anesthetized with 1% lidocaine in a 1 cm incision was made in the skin. It was noted that there was significant scar tissue from previous bypass surgery within the left groin. Under ultrasound guidance a  micropuncture needle was used to access the left common femoral artery limb of the aortobifemoral bypass graft. Through the needle an 018 wire was advanced. Over the 018 wire a stiff micropuncture sheath was placed into the left common femoral bypass limb. Through the Jacoby sheath a super stiff Amplatz wire was advanced. The Jacoby sheath was removed and serial dilatation of the severely scarred down left groin was performed initially with a 5 Djiboutian, 6 Djiboutian and 7 Djiboutian dilator. Over the stiff Amplatz wire a 6 Djiboutian sheath was advanced into the left common femoral bypass limb. Using a combination of a Omni flush catheter and a stiff Glidewire the wire was manipulated up and over the aortobifemoral bypass graft into the right common femoral limb of the graft. Within Omni Flush catheter in the distal segment of the right common femoral bypass limb a right lower extremity angiogram was obtained. Through the Omni Flush catheter a stiff Amplatz wire was advanced. The Omni Flush catheter was then removed. The left groin short  6 Djiboutian sheath was then exchanged for a 6 [place]ib[place]ian by 45 cm up and over Ansell sheath. Through the Ansell sheath using a stiff Glidewire and a 5 Djiboutian KMP catheter the nipple of the occluded/thrombosed bypass graft was cannulated and the wire was manipulated through the thrombosed bypass graft eventually into the distal anastomosis. Contrast was injected through the catheter showing that outflow tibial artery distal to the anastomosis appears to also be occluded. The Glidewire was then exchanged for a stiff Amplatz wire. Over the stiff Amplatz wire a 50 cm x 135 cm [facility] infusion catheter was placed spanning the entire length of the bypass graft.  FINDINGS: Right lower extremity angiogram confirms the right common femoral to below-knee bypass graft is occluded. Angiogram below the knee just distal to the anastomosis of the graft also shows that the outflow tibial artery also appears  occluded.      IMPRESSION:  Right leg angiogram confirms that the right femoral to below-knee tibial bypass graft is thrombosed. Angiogram below the knee with a catheter also shows that the tibial outflow artery distal to the anastomosis is also occluded.  Placement of a HerveTommy infusion catheter spanning the entire length of right femoral to below-knee bypass graft for catheter directed thrombolytic therapy. 0.5 mg of alteplase per hour will be infused through the catheter. 500 units of heparin per hour will be infused through the sheath. ____________________________________________________________________

## 2024-04-28 NOTE — PROGRESS NOTES
PM&R PROGRESS NOTE     Patient Active Problem List   Diagnosis    Reflux esophagitis    Health Care Home    Osteoporosis    Cervicalgia    Hyperlipidemia LDL goal <70    PAD (peripheral artery disease) (H24)    Essential hypertension    Coronary artery disease involving native coronary artery of native heart without angina pectoris    Primary osteoarthritis involving multiple joints    DDD (degenerative disc disease), lumbar    S/P carotid endarterectomy    Chronic allergic rhinitis due to animal hair and dander    Tobacco use disorder    Chronic obstructive pulmonary disease, unspecified COPD type (H)    Anxiety    Vitamin C deficiency    History of colonic polyps    SVT (supraventricular tachycardia) (H24)    Bilateral carpal tunnel syndrome    Charcot-Breonna-Tooth disease type 1A    Atherosclerosis of bypass graft of right lower extremity with other clinical manifestation (H24)    Pseudoaneurysm of femoral artery following procedure (H24)    NSTEMI (non-ST elevated myocardial infarction) (H)    Acute reaction to situational stress    Acute on chronic anemia    Lymphoma of lymph nodes of neck, unspecified lymphoma type (H)    Arterial occlusion    S/P AKA (above knee amputation) unilateral, right (H)       HPI   Shirley Hendricks is a 63 year old female admitted to the ARU on 4/22/2024 following a left lower extremity above-knee amputation.    Patient seen and examined today.  She continues to have loose bowel movements, reports 4-5 yesterday and 2 since morning today.  C. difficile was done last night and the results were noted to be negative today.  Discussed with nursing and remove the isolation on the door  Would recommend Imodium for the patient to prevent incontinence and loose stool and maximize participation in therapies.    We also were able to connect with the fellow from vascular surgery who had reviewed the image of the AKA.  He will be here tomorrow to assess in person.  Bottom line is that he did  "not have any concerns on the initial inspection of the image.  Patient was counseled on the incision.      Orders Placed This Encounter      Gluten Free Diet        Review Of Systems  Total of ten systems reviewed, pertinent positives and negatives as follows  Denies chest pain fever chills rigors  Denies any shortness of breath   Denies any nausea or vomiting   Denies any lightheadedness or dizziness     Orders Placed This Encounter      Gluten Free Diet  Remainder of the review of the systems was negative        BP (!) 141/66   Pulse 68   Temp 97.2  F (36.2  C) (Axillary)   Resp 16   Ht 1.549 m (5' 0.98\")   Wt 51.8 kg (114 lb 3.2 oz)   LMP  (LMP Unknown)   SpO2 97%   BMI 21.59 kg/m    Physical exam    Patient is lying in bed comfortable in no acute distress   HEENT NC AT PRTL EOM good   Neck supple  Heart S1S2  Lungs CTA  Abdomen  benign BS positive NT NR   Right LE no edema       Left AKA    Current Facility-Administered Medications   Medication Dose Route Frequency Provider Last Rate Last Admin    acetaminophen (TYLENOL) tablet 500-1,000 mg  500-1,000 mg Oral Q6H PRN Corrina Hurt PA-C   1,000 mg at 04/25/24 1743    amLODIPine (NORVASC) tablet 5 mg  5 mg Oral Daily Corrina Hurt PA-C   5 mg at 04/28/24 0800    carvedilol (COREG) tablet 6.25 mg  6.25 mg Oral BID w/meals Corrina Hurt PA-C   6.25 mg at 04/28/24 0754    cetirizine (zyrTEC) tablet 5 mg  5 mg Oral Daily Corrina Hurt PA-C   5 mg at 04/28/24 0755    clopidogrel (PLAVIX) tablet 75 mg  75 mg Oral Daily Corrina Hurt PA-C   75 mg at 04/28/24 0755    diphenoxylate-atropine (LOMOTIL) 2.5-0.025 MG per tablet 1 tablet  1 tablet Oral 4x Daily PRN Corrina Hurt PA-C   1 tablet at 04/25/24 1941    famotidine (PEPCID) tablet 20 mg  20 mg Oral Q48H Corrina Hurt PA-C   20 mg at 04/26/24 1624    fluticasone-vilanterol (BREO ELLIPTA) 200-25 MCG/ACT inhaler 1 puff  1 puff Inhalation Daily Corrina Hurt, " COLE   1 puff at 04/26/24 0932    gabapentin (NEURONTIN) capsule 200 mg  200 mg Oral Once per day on Tuesday Thursday Saturday Corrina Hurt PA-C   200 mg at 04/27/24 2027    heparin ANTICOAGULANT injection 5,000 Units  5,000 Units Subcutaneous Q12H Corrnia Hurt PA-C   5,000 Units at 04/28/24 0800    HYDROmorphone (DILAUDID) tablet 2 mg  2 mg Oral Q6H PRN Corrina Hurt PA-C   2 mg at 04/27/24 2008    lactobacillus rhamnosus (GG) (CULTURELL) capsule 1 capsule  1 capsule Oral BID Corrina Hurt PA-C   1 capsule at 04/28/24 0800    miconazole (MICATIN) 2 % powder   Topical BID Corrina Hurt PA-C   Given at 04/27/24 2028    multivitamin RENAL (TRIPHROCAPS) capsule 1 capsule  1 capsule Oral Daily Corrina Hurt PA-C   1 capsule at 04/28/24 0755    naloxone (NARCAN) injection 0.2 mg  0.2 mg Intravenous Q2 Min PRN Arun Pimentel MD        Or    naloxone (NARCAN) injection 0.4 mg  0.4 mg Intravenous Q2 Min PRN Arun Pimentel MD        Or    naloxone (NARCAN) injection 0.2 mg  0.2 mg Intramuscular Q2 Min PRN Arun Pimentel MD        Or    naloxone (NARCAN) injection 0.4 mg  0.4 mg Intramuscular Q2 Min PRN Arun Pimentel MD        nicotine (NICODERM CQ) 14 MG/24HR 24 hr patch 1 patch  1 patch Transdermal Daily Corrina Hurt PA-C   1 patch at 04/28/24 0755    nitroGLYcerin (NITROSTAT) sublingual tablet 0.4 mg  0.4 mg Sublingual Q5 Min PRN Corrina Hurt PA-C        ondansetron (ZOFRAN ODT) ODT tab 4 mg  4 mg Oral Q6H PRN Corrina Hurt PA-C   4 mg at 04/27/24 2008    polyethylene glycol (MIRALAX) powder 17 g  17 g Oral Daily PRN Corrina Hurt PA-C        rosuvastatin (CRESTOR) tablet 10 mg  10 mg Oral At Bedtime Corrina Hurt PA-C   10 mg at 04/27/24 2027    scopolamine (TRANSDERM) 72 hr patch 1 patch  1 patch Transdermal Q72H Corrina Hurt PA-C   1 patch at 04/26/24 1215    And    scopolamine (TRANSDERM-SCOP) Patch in Place   Transdermal Q8H Blu  Corrina PAIZ PA-C        senna-docusate (SENOKOT-S/PERICOLACE) 8.6-50 MG per tablet 1-2 tablet  1-2 tablet Oral BID PRN Corrina Hurt PA-C            Labs:  Lab Results   Component Value Date    WBC 4.3 04/27/2024    HGB 8.7 (L) 04/27/2024    HCT 27.2 (L) 04/27/2024     04/27/2024     (L) 04/27/2024    POTASSIUM 4.5 04/27/2024    CHLORIDE 93 (L) 04/27/2024    CO2 25 04/27/2024    BUN 29.0 (H) 04/27/2024    CR 2.67 (H) 04/27/2024    GLC 80 04/27/2024    SED 39 (H) 11/13/2023    DD 10.38 (H) 08/13/2021    TROPONIN <0.07 09/04/2005    TROPI <0.015 06/10/2020    AST 16 04/23/2024    ALT 20 04/23/2024    ALKPHOS 96 04/23/2024    BILITOTAL 0.5 04/23/2024    INR 1.31 (H) 04/11/2024   C. difficile negative      Attestation:  This patient has been seen and evaluated by me, Laury Cadena MD.    Total time: 35 minutes more than 50% in Care coordination on the floor/ counseling the patient face to face   Laury Cadena MD, [facility]   Department of Rehabilitation

## 2024-04-28 NOTE — PROGRESS NOTES
Nephrology Progress Note  04/28/2024         Assessment & Recommendations:   Shirley Hendricks is a 63 year old year old with peripheral artery disease, coronary artery disease and history of MI, hypertension, diabetes mellitus type 2 who is admitted to ARU following hospitalization for occlusion of right LE bypass graft managed with right above-the-knee amputation and complication of acute kidney injury requiring dialysis.     #Acute kidney injury: Felt to be due to contrast nephropathy as she received contrast 3 days in a row for evaluation and management of her occluded bypass graft.  There is also potentially some component of rhabdomyolysis.  -First dialysis run 4/3/2024.  -Access tunneled CVC initially placed 4/3/2024 and revised on 4/15/2024.  -Suspicion of underlying diabetic nephropathy and/or renovascular disease.  - continue dialysis Tuesday, Thursday, Saturday-    - diuretic challenge tomorrow and check creaitnine Monday and Tuesday  - has been receiving hemodialysis at [facility] and consents to ongoing hemodialysis while at [facility].    -Dialysis yesterday- tolerated better, 3 liters removed.  - UOP once a day at this time.  Note pleural effusion on imaging and so we will challenge volume status as able  -continue to monitor for renal recovery. Please order creatinine for Tuesday 4/30 before HD  - can consider diuretic challenge on non dialysis day.       #Hyponatremia: Sodium  before HD was 127.  This is due to fluid intake in the absence of renal function.    - 1 L daily fluid restriction     #PAD/PVD  #Right common femoral artery to the mid anterior tibial artery bypass graft acute occlusion  #Right above-the-knee amputation 4/9/2024  - Plavix 75 mg daily (previously on Xarelto but no ongoing indication)     #Anemia due to acute blood loss with retroperitoneal hematoma and history of GI bleeding in December 2023.  - Has required transfusion with PRBCs.  -Has been receiving EPO 10K and  "Venofer with hemodialysis  -Will order hemoglobin for next dialysis treatment.  - retacrit 10K units three times weekly with HD     #Coronary artery disease with history of myocardial infarction x 3  #History of Takotsubo cardiomyopathy  PTA carvedilol and lisinopril, lisinopril has been on hold.  PTA rosuvastatin was initially held due to rhabdo but resumed.  PTA empagliflozin 10 mg daily has been discontinued given acute kidney injury     #Diabetes mellitus type 2  - Not requiring medications at this time     #Stage Mark marginal zone B-cell lymphoma  - Undergoing surveillance with [place] [facility]     Recommendations were communicated to primary team via note    Soo Gabriele Mejia MD   Division of Renal Disease and Hypertension  [employer]  Vocera Web Console    Interval History :   Nursing and provider notes from last 24 hours reviewed.  In the last 24 hours Shirley is doing OK, tolerated dialysis with 3 liters UF yesterday  No UOP today, had 3 x yesterday.  -     Review of Systems:   I reviewed the following systems:  GI: stable appetite. no nausea or vomiting, + diarrhea.   Neuro:  no confusion  Constitutional:  no fever or chills  CV: no dyspnea or edema.  no chest pain.    Physical Exam:   I/O last 3 completed shifts:  In: 350 [P.O.:350]  Out: 3000 [Other:3000]   BP (!) 168/69   Pulse 69   Temp 98.1  F (36.7  C) (Oral)   Resp 16   Ht 1.549 m (5' 0.98\")   Wt 51.8 kg (114 lb 3.2 oz)   LMP  (LMP Unknown)   SpO2 92%   BMI 21.59 kg/m       GENERAL APPEARANCE: no distress  EYES:  no scleral icterus, pupils equal  Mouth: dry mucosa  PULM: normal work of breathing  CV: 2+ left foot edema  INTEGUMENT: no cyanosis, no rash  NEURO:  speech fluent, face symmetric    Labs:   All labs reviewed by me  Electrolytes/Renal -   Recent Labs   Lab Test 04/28/24  0039 04/27/24  0722 04/26/24  0210 04/25/24  0540 04/23/24  2136 04/23/24  0602   NA  --  127*  --  126*  --  124*   POTASSIUM  --  4.5  --  4.3  --  4.3 "   CHLORIDE  --  93*  --  95*  --  92*   CO2  --  25  --  24  --  22   BUN  --  29.0*  --  36.9*  --  52.7*   CR  --  2.67*  --  2.76*  --  2.83*   GLC  --  80 87 86  --  74   SONIA  --  7.7*  --  7.7*  --  7.4*   MAG 2.1 1.4*  --  2.3   < > 1.5*   PHOS  --  2.7  --  2.8  --  4.3    < > = values in this interval not displayed.       CBC -   Recent Labs   Lab Test 04/27/24  0722 04/25/24  0540 04/20/24  1821 04/20/24  0645 04/18/24  0604 04/17/24  0636   WBC 4.3 4.4  --   --   --  8.5   HGB 8.7* 9.0* 8.6* 7.4*   < > 8.5*    282  --  170  --  225    < > = values in this interval not displayed.       LFTs -   Recent Labs   Lab Test 04/23/24  0602 04/17/24  0636 04/16/24  0642 04/15/24  0536 04/09/24  0636 04/04/24  0541   ALKPHOS 96  --   --   --  155* 192*   BILITOTAL 0.5  --   --   --  0.9 0.6   ALT 20  --   --   --  53* 326*   AST 16  --   --   --  68* 301*   PROTTOTAL 4.1*  --   --   --  3.9* 4.2*   ALBUMIN 1.9* 2.3* 2.1*   < > 1.8* 2.1*    < > = values in this interval not displayed.       Iron Panel -   Recent Labs   Lab Test 04/10/24  0559 04/09/24  1817 01/08/24  1542 12/11/23  0550   IRON 22*  --  71 122   IRONSAT 18  --  17 32   BRYN  --  609* 25 23       Current Medications:  Current Facility-Administered Medications   Medication Dose Route Frequency Provider Last Rate Last Admin    amLODIPine (NORVASC) tablet 5 mg  5 mg Oral Daily Corrina Hurt PA-C   5 mg at 04/28/24 0800    carvedilol (COREG) tablet 6.25 mg  6.25 mg Oral BID w/meals Corrina Hurt PA-C   6.25 mg at 04/28/24 1743    cetirizine (zyrTEC) tablet 5 mg  5 mg Oral Daily Corrina Hurt PA-C   5 mg at 04/28/24 0755    clopidogrel (PLAVIX) tablet 75 mg  75 mg Oral Daily Corrina Hurt PA-C   75 mg at 04/28/24 0755    famotidine (PEPCID) tablet 20 mg  20 mg Oral Q48H Corrina Hurt PA-C   20 mg at 04/28/24 1629    fluticasone-vilanterol (BREO ELLIPTA) 200-25 MCG/ACT inhaler 1 puff  1 puff Inhalation Daily Blu  Corrina PAIZ PA-C   1 puff at 04/26/24 0932    gabapentin (NEURONTIN) capsule 200 mg  200 mg Oral Once per day on Tuesday Thursday Saturday Corrina Hurt PA-C   200 mg at 04/27/24 2027    heparin ANTICOAGULANT injection 5,000 Units  5,000 Units Subcutaneous Q12H Corrina Hurt PA-C   5,000 Units at 04/28/24 0800    lactobacillus rhamnosus (GG) (CULTURELL) capsule 1 capsule  1 capsule Oral BID Corrina Hurt PA-C   1 capsule at 04/28/24 0800    miconazole (MICATIN) 2 % powder   Topical BID Corrina Hurt PA-C   Given at 04/27/24 2028    multivitamin RENAL (TRIPHROCAPS) capsule 1 capsule  1 capsule Oral Daily Corrina Hurt PA-C   1 capsule at 04/28/24 0755    nicotine (NICODERM CQ) 14 MG/24HR 24 hr patch 1 patch  1 patch Transdermal Daily Corrina Hurt PA-C   1 patch at 04/28/24 0755    rosuvastatin (CRESTOR) tablet 10 mg  10 mg Oral At Bedtime Corrina Hurt PA-C   10 mg at 04/27/24 2027    scopolamine (TRANSDERM) 72 hr patch 1 patch  1 patch Transdermal Q72H Corrina Hurt PA-C   1 patch at 04/26/24 1215    And    scopolamine (TRANSDERM-SCOP) Patch in Place   Transdermal Q8H Corrina Hurt PA-C         Current Facility-Administered Medications   Medication Dose Route Frequency Provider Last Rate Last Admin     Soo Gabriele Mejia MD

## 2024-04-28 NOTE — PROGRESS NOTES
VS: VSS   O2: 94% AT RA   Output: SEE I AND O FLOW SHEET   Last BM: 4/28/24   Activity: ASSIST OF 1 WITH CANE   Skin: INTACT   Pain: R AKA PAIN IS MANAGED WITH SCHEDULED TYLENOL AND PRN DILAUDID   CMS: INTACT   Dressing: RIGHT AKA   Diet: REGULAR/ THIN   LDA: LEFT FOREARM PIV, RIGHT NECK CVC LINE   Equipment: T BELT  AND WHEEL CHAIR   Plan: WE WILL CONTINUE CURRENT PLAN OF CARE   Additional Info:

## 2024-04-28 NOTE — PLAN OF CARE
Discussed case with vascular surgery after evaluation. Incision was approximated during surgery to allow for some dehiscence to allow for drainage. Incision looks okay per vascular surgery at this time.  Vascular surgery did note dressing change had not been done since Friday, should be daily. Clarified orders. Also noted that ace wrap over eschar was too tight not allowing healing. Clarified wound care order for eschar as well. Vascular recommends follow up in 1 month for incision check (if still patient, page vascular to evaluate, otherwise follow up in clinic in 1 month from today).

## 2024-04-28 NOTE — PLAN OF CARE
Goal Outcome Evaluation:      Plan of Care Reviewed With: patient    Overall Patient Progress: no change    Outcome Evaluation: Pt advocating for pain management, MD saw incision todaya nd did dressing, continues to need encouragement to go from side to side in bed, continues to be safe using call light and waiting for assistance.    FOCUS/GOAL  Pain management    ASSESSMENT, INTERVENTIONS AND CONTINUING PLAN FOR GOAL:  Pt Aox4, using call light to make needs known. A1 pivot. Reporting pain to stump controlled with PRN dilaudid. Denies cough, sob, chest pain, n/v.  Incont of BM, LBM today not voiding due to dialysis.  CVC in place to R chest for dialysis tue/thurs/sat.  Reg/thin, taking pills whole with water. Care to coccyx wound done per order.  Working with therapies. Nursing will continue with POC.

## 2024-04-29 ENCOUNTER — APPOINTMENT (OUTPATIENT)
Dept: OCCUPATIONAL THERAPY | Facility: CLINIC | Age: 64
DRG: 559 | End: 2024-04-29
Attending: PHYSICAL MEDICINE & REHABILITATION
Payer: COMMERCIAL

## 2024-04-29 ENCOUNTER — APPOINTMENT (OUTPATIENT)
Dept: PHYSICAL THERAPY | Facility: CLINIC | Age: 64
DRG: 559 | End: 2024-04-29
Attending: PHYSICAL MEDICINE & REHABILITATION
Payer: COMMERCIAL

## 2024-04-29 LAB
ANION GAP SERPL CALCULATED.3IONS-SCNC: 10 MMOL/L (ref 7–15)
BUN SERPL-MCNC: 21 MG/DL (ref 8–23)
CALCIUM SERPL-MCNC: 7.6 MG/DL (ref 8.8–10.2)
CHLORIDE SERPL-SCNC: 92 MMOL/L (ref 98–107)
CREAT SERPL-MCNC: 3.2 MG/DL (ref 0.51–0.95)
DEPRECATED HCO3 PLAS-SCNC: 24 MMOL/L (ref 22–29)
EGFRCR SERPLBLD CKD-EPI 2021: 15 ML/MIN/1.73M2
GLUCOSE SERPL-MCNC: 79 MG/DL (ref 70–99)
MAGNESIUM SERPL-MCNC: 2 MG/DL (ref 1.7–2.3)
POTASSIUM SERPL-SCNC: 4.4 MMOL/L (ref 3.4–5.3)
SODIUM SERPL-SCNC: 126 MMOL/L (ref 135–145)

## 2024-04-29 PROCEDURE — 97110 THERAPEUTIC EXERCISES: CPT | Mod: GP

## 2024-04-29 PROCEDURE — 80048 BASIC METABOLIC PNL TOTAL CA: CPT | Performed by: PHYSICIAN ASSISTANT

## 2024-04-29 PROCEDURE — 128N000003 HC R&B REHAB

## 2024-04-29 PROCEDURE — 99232 SBSQ HOSP IP/OBS MODERATE 35: CPT | Performed by: PHYSICAL MEDICINE & REHABILITATION

## 2024-04-29 PROCEDURE — 250N000009 HC RX 250

## 2024-04-29 PROCEDURE — 97530 THERAPEUTIC ACTIVITIES: CPT | Mod: GP

## 2024-04-29 PROCEDURE — 250N000013 HC RX MED GY IP 250 OP 250 PS 637: Performed by: INTERNAL MEDICINE

## 2024-04-29 PROCEDURE — 250N000013 HC RX MED GY IP 250 OP 250 PS 637: Performed by: PHYSICIAN ASSISTANT

## 2024-04-29 PROCEDURE — 250N000009 HC RX 250: Performed by: PHYSICIAN ASSISTANT

## 2024-04-29 PROCEDURE — 97535 SELF CARE MNGMENT TRAINING: CPT | Mod: GO | Performed by: STUDENT IN AN ORGANIZED HEALTH CARE EDUCATION/TRAINING PROGRAM

## 2024-04-29 PROCEDURE — 83735 ASSAY OF MAGNESIUM: CPT | Performed by: PHYSICAL MEDICINE & REHABILITATION

## 2024-04-29 PROCEDURE — 250N000011 HC RX IP 250 OP 636: Performed by: PHYSICIAN ASSISTANT

## 2024-04-29 PROCEDURE — 97110 THERAPEUTIC EXERCISES: CPT | Mod: GO | Performed by: STUDENT IN AN ORGANIZED HEALTH CARE EDUCATION/TRAINING PROGRAM

## 2024-04-29 PROCEDURE — 99233 SBSQ HOSP IP/OBS HIGH 50: CPT | Mod: 24 | Performed by: INTERNAL MEDICINE

## 2024-04-29 PROCEDURE — 36415 COLL VENOUS BLD VENIPUNCTURE: CPT | Performed by: PHYSICAL MEDICINE & REHABILITATION

## 2024-04-29 RX ORDER — FUROSEMIDE 40 MG
80 TABLET ORAL ONCE
Status: COMPLETED | OUTPATIENT
Start: 2024-04-29 | End: 2024-04-29

## 2024-04-29 RX ORDER — FUROSEMIDE 20 MG
20 TABLET ORAL ONCE
Status: COMPLETED | OUTPATIENT
Start: 2024-04-29 | End: 2024-04-29

## 2024-04-29 RX ORDER — SILVER SULFADIAZINE 10 MG/G
CREAM TOPICAL DAILY
Status: DISCONTINUED | OUTPATIENT
Start: 2024-04-29 | End: 2024-05-15 | Stop reason: HOSPADM

## 2024-04-29 RX ADMIN — CLOPIDOGREL BISULFATE 75 MG: 75 TABLET ORAL at 08:14

## 2024-04-29 RX ADMIN — MICONAZOLE NITRATE: 20 POWDER TOPICAL at 08:17

## 2024-04-29 RX ADMIN — CARVEDILOL 6.25 MG: 6.25 TABLET, FILM COATED ORAL at 18:13

## 2024-04-29 RX ADMIN — CARVEDILOL 6.25 MG: 6.25 TABLET, FILM COATED ORAL at 08:14

## 2024-04-29 RX ADMIN — Medication 1 CAPSULE: at 08:14

## 2024-04-29 RX ADMIN — ROSUVASTATIN CALCIUM 10 MG: 5 TABLET, COATED ORAL at 20:27

## 2024-04-29 RX ADMIN — SILVER SULFADIAZINE: 10 CREAM TOPICAL at 15:34

## 2024-04-29 RX ADMIN — HEPARIN SODIUM 5000 UNITS: 5000 INJECTION, SOLUTION INTRAVENOUS; SUBCUTANEOUS at 08:17

## 2024-04-29 RX ADMIN — Medication 1 CAPSULE: at 20:27

## 2024-04-29 RX ADMIN — HYDROMORPHONE HYDROCHLORIDE 2 MG: 2 TABLET ORAL at 20:33

## 2024-04-29 RX ADMIN — DIPHENOXYLATE HYDROCHLORIDE AND ATROPINE SULFATE 1 TABLET: 2.5; .025 TABLET ORAL at 12:06

## 2024-04-29 RX ADMIN — FUROSEMIDE 80 MG: 40 TABLET ORAL at 11:11

## 2024-04-29 RX ADMIN — HEPARIN SODIUM 5000 UNITS: 5000 INJECTION, SOLUTION INTRAVENOUS; SUBCUTANEOUS at 20:27

## 2024-04-29 RX ADMIN — FUROSEMIDE 20 MG: 20 TABLET ORAL at 08:23

## 2024-04-29 RX ADMIN — CETIRIZINE HYDROCHLORIDE 5 MG: 5 TABLET ORAL at 08:13

## 2024-04-29 RX ADMIN — AMLODIPINE BESYLATE 5 MG: 5 TABLET ORAL at 08:13

## 2024-04-29 RX ADMIN — NICOTINE 1 PATCH: 14 PATCH, EXTENDED RELEASE TRANSDERMAL at 08:13

## 2024-04-29 RX ADMIN — SCOPALAMINE 1 PATCH: 1 PATCH, EXTENDED RELEASE TRANSDERMAL at 08:14

## 2024-04-29 RX ADMIN — ACETAMINOPHEN 1000 MG: 500 TABLET ORAL at 14:27

## 2024-04-29 ASSESSMENT — ACTIVITIES OF DAILY LIVING (ADL)
ADLS_ACUITY_SCORE: 35
ADLS_ACUITY_SCORE: 35
ADLS_ACUITY_SCORE: 39
ADLS_ACUITY_SCORE: 39
ADLS_ACUITY_SCORE: 35
ADLS_ACUITY_SCORE: 37
ADLS_ACUITY_SCORE: 35
ADLS_ACUITY_SCORE: 39
ADLS_ACUITY_SCORE: 37
ADLS_ACUITY_SCORE: 39
ADLS_ACUITY_SCORE: 37
ADLS_ACUITY_SCORE: 35
ADLS_ACUITY_SCORE: 35
ADLS_ACUITY_SCORE: 39
ADLS_ACUITY_SCORE: 35
ADLS_ACUITY_SCORE: 39
ADLS_ACUITY_SCORE: 39
ADLS_ACUITY_SCORE: 35
ADLS_ACUITY_SCORE: 35
ADLS_ACUITY_SCORE: 39

## 2024-04-29 NOTE — PLAN OF CARE
Discharge Planner Post-Acute Rehab OT:     Discharge Plan: home with HH OT     Precautions: fall, NWB of RLE, dialysis, sacral wound, elevate residual limb when in bed    Current Status:  ADLs:  Mobility: min A for slide board to WC  Grooming: INDup seated  Dressing: UB: set up, LB: set up in supine  Bathing: TBD  Toileting: liko lift to commode, working on SB to commode Ax1  IADLs: Pt does most IADL including caregiving for .   Vision/Cognition: intact    Assessment: Working on slide board transfers to commode. Updated board so pt can do these with nursing. Pt requires assist for clothing management but can do her own lydia cares. Pt incontinent of BM during transfer despite not feeling like she had to go.     Other Barriers to Discharge (DME, Family Training, etc): DME, family training, pt has limited support despite living with multiple family members.

## 2024-04-29 NOTE — PLAN OF CARE
Discharge Plan: home with HH PT      Precautions: fall, NWB of RLE, sacral wound, elevate RLE in bed    Current Status:  Bed Mobility: [place]   Transfer: Slideboard SBA bed<>w/c w/ min setup assist.  Gait: unable, unsafe  Stairs: not tested  Balance: Stable dynamic sitting.     Assessment: Continues to have liquid bowel incontinence w/ all slideboard transfers. Per discussion seems that pt will have near 24/7 assist if needed. Stating she will have to have conversation w/ son/brother/aunt to confirm level of ADL assist they are willing to provide, especially related to toileting.     Other Barriers to Discharge (DME, Family Training, etc):   DME: K3 w/c (order to be faxed 4/29), slideboard, bedrail.     Family training: TBD. Car tx, bumping up 3STE in w/c.

## 2024-04-29 NOTE — PROGRESS NOTES
Nephrology Progress Note  04/29/2024         Assessment & Recommendations:   Shirley Hendricks is a 63 year old year old with peripheral artery disease, coronary artery disease and history of MI, hypertension, diabetes mellitus type 2 who is admitted to ARU following hospitalization for occlusion of right LE bypass graft managed with right above-the-knee amputation and complication of acute kidney injury requiring dialysis.     #Acute kidney injury: Felt to be due to contrast nephropathy as she received contrast 3 days in a row for evaluation and management of her occluded bypass graft.  There is also potentially some component of rhabdomyolysis.  -First dialysis run 4/3/2024.  -Access tunneled CVC initially placed 4/3/2024 and revised on 4/15/2024.  -Suspicion of underlying diabetic nephropathy and/or renovascular disease.  - continue dialysis Tuesday, Thursday, Saturday-    - diuretic challenge today, one urine occurrence thus far today, ordered higher dose furosemide, and check creaitnine tomorrow AM before dialysis  - has been receiving hemodialysis at [facility] and consents to ongoing hemodialysis while at [facility].    -Dialysis tolerated on Saturday  - UOP once a day at this time.  Note pleural effusion on imaging and so we will challenge volume status as able  -continue to monitor for renal recovery. Please order creatinine for Tuesday 4/30 before HD  - continue diuretic challenge on non dialysis day.       #Hyponatremia: Sodium is low at 126-127.  This is due to fluid intake in the absence of renal function.    - 1 L daily fluid restriction- she is making some urine and hope for recovery.     #PAD/PVD  #Right common femoral artery to the mid anterior tibial artery bypass graft acute occlusion  #Right above-the-knee amputation 4/9/2024  - Plavix 75 mg daily (previously on Xarelto but no ongoing indication)     #Anemia due to acute blood loss with retroperitoneal hematoma and history of GI bleeding in  "December 2023.  - Has required transfusion with PRBCs.  -Has been receiving EPO 10K and Venofer with hemodialysis  -Will order hemoglobin for next dialysis treatment.  - retacrit 10K units three times weekly with HD     #Coronary artery disease with history of myocardial infarction x 3  #History of Takotsubo cardiomyopathy  PTA carvedilol and lisinopril, lisinopril has been on hold.  PTA rosuvastatin was initially held due to rhabdo but resumed.  PTA empagliflozin 10 mg daily has been discontinued given acute kidney injury     #Diabetes mellitus type 2  - Not requiring medications at this time     #Stage Mark marginal zone B-cell lymphoma  - Undergoing surveillance with [place] [facility]     Recommendations were communicated to primary team via note    Soo Gabriele Mejia MD   Division of Renal Disease and Hypertension  [employer]  Vocera Web Console    Interval History :   Nursing and provider notes from last 24 hours reviewed.  In the last 24 hours Shirley is doing OK, tolerated dialysis with 3 liters UF Saturday, next HD tomorrow unless much more UOP noted.  -     Review of Systems:   I reviewed the following systems:  GI: stable appetite. no nausea or vomiting, + diarrhea.   Neuro:  no confusion  Constitutional:  no fever or chills  CV: no dyspnea or edema.  no chest pain.    Physical Exam:   I/O last 3 completed shifts:  In: 1075 [P.O.:1075]  Out: -    BP (!) 158/65 (BP Location: Left leg, Patient Position: Semi-Myers's, Cuff Size: Adult Regular)   Pulse 68   Temp 98.1  F (36.7  C) (Oral)   Resp 18   Ht 1.549 m (5' 0.98\")   Wt 51.8 kg (114 lb 3.2 oz)   LMP  (LMP Unknown)   SpO2 92%   BMI 21.59 kg/m       GENERAL APPEARANCE: no distress  EYES:  no scleral icterus, pupils equal  Mouth: dry mucosa  PULM: normal work of breathing  CV: 1+ left foot edema  INTEGUMENT: no cyanosis, no rash  NEURO:  speech fluent, face symmetric    Labs:   All labs reviewed by me  Electrolytes/Renal -   Recent Labs   Lab Test " 04/29/24  0615 04/28/24  0039 04/27/24  0722 04/26/24  0210 04/25/24  0540 04/23/24  2136 04/23/24  0602   *  --  127*  --  126*  --  124*   POTASSIUM 4.4  --  4.5  --  4.3  --  4.3   CHLORIDE 92*  --  93*  --  95*  --  92*   CO2 24  --  25  --  24  --  22   BUN 21.0  --  29.0*  --  36.9*  --  52.7*   CR 3.20*  --  2.67*  --  2.76*  --  2.83*   GLC 79  --  80 87 86  --  74   SNOIA 7.6*  --  7.7*  --  7.7*  --  7.4*   MAG 2.0 2.1 1.4*  --  2.3   < > 1.5*   PHOS  --   --  2.7  --  2.8  --  4.3    < > = values in this interval not displayed.       CBC -   Recent Labs   Lab Test 04/27/24  0722 04/25/24  0540 04/20/24  1821 04/20/24  0645 04/18/24  0604 04/17/24  0636   WBC 4.3 4.4  --   --   --  8.5   HGB 8.7* 9.0* 8.6* 7.4*   < > 8.5*    282  --  170  --  225    < > = values in this interval not displayed.       LFTs -   Recent Labs   Lab Test 04/23/24  0602 04/17/24  0636 04/16/24  0642 04/15/24  0536 04/09/24  0636 04/04/24  0541   ALKPHOS 96  --   --   --  155* 192*   BILITOTAL 0.5  --   --   --  0.9 0.6   ALT 20  --   --   --  53* 326*   AST 16  --   --   --  68* 301*   PROTTOTAL 4.1*  --   --   --  3.9* 4.2*   ALBUMIN 1.9* 2.3* 2.1*   < > 1.8* 2.1*    < > = values in this interval not displayed.       Iron Panel -   Recent Labs   Lab Test 04/10/24  0559 04/09/24  1817 01/08/24  1542 12/11/23  0550   IRON 22*  --  71 122   IRONSAT 18  --  17 32   BRYN  --  609* 25 23       Current Medications:  Current Facility-Administered Medications   Medication Dose Route Frequency Provider Last Rate Last Admin    amLODIPine (NORVASC) tablet 5 mg  5 mg Oral Daily Corrina Hurt PA-C   5 mg at 04/29/24 0813    carvedilol (COREG) tablet 6.25 mg  6.25 mg Oral BID w/meals Corrina Hurt PA-C   6.25 mg at 04/29/24 0814    cetirizine (zyrTEC) tablet 5 mg  5 mg Oral Daily Corrina Hurt PA-C   5 mg at 04/29/24 0813    clopidogrel (PLAVIX) tablet 75 mg  75 mg Oral Daily Corrina Hurt PA-C   75 mg at  04/29/24 0814    famotidine (PEPCID) tablet 20 mg  20 mg Oral Q48H Corrina Hurt PA-C   20 mg at 04/28/24 1629    fluticasone-vilanterol (BREO ELLIPTA) 200-25 MCG/ACT inhaler 1 puff  1 puff Inhalation Daily Corrina Hurt PA-C   1 puff at 04/26/24 0932    gabapentin (NEURONTIN) capsule 200 mg  200 mg Oral Once per day on Tuesday Thursday Saturday Corrina Hurt PA-C   200 mg at 04/27/24 2027    heparin ANTICOAGULANT injection 5,000 Units  5,000 Units Subcutaneous Q12H Corrina Hurt PA-C   5,000 Units at 04/29/24 0817    lactobacillus rhamnosus (GG) (CULTURELL) capsule 1 capsule  1 capsule Oral BID Corrina Hurt PA-C   1 capsule at 04/29/24 0814    miconazole (MICATIN) 2 % powder   Topical BID Corrina Hurt PA-C   Given at 04/29/24 0817    multivitamin RENAL (TRIPHROCAPS) capsule 1 capsule  1 capsule Oral Daily Corrina Hurt PA-C   1 capsule at 04/29/24 0814    nicotine (NICODERM CQ) 14 MG/24HR 24 hr patch 1 patch  1 patch Transdermal Daily Corrina Hurt PA-C   1 patch at 04/29/24 0813    rosuvastatin (CRESTOR) tablet 10 mg  10 mg Oral At Bedtime Corrina Hurt PA-C   10 mg at 04/28/24 2041    scopolamine (TRANSDERM) 72 hr patch 1 patch  1 patch Transdermal Q72H Corrina Hurt PA-C   1 patch at 04/29/24 0814    And    scopolamine (TRANSDERM-SCOP) Patch in Place   Transdermal Q8H Corrina Hurt PA-C        silver sulfADIAZINE (SILVADENE) 1 % cream   Topical Daily Gala Lo MD   Given at 04/29/24 1534     Current Facility-Administered Medications   Medication Dose Route Frequency Provider Last Rate Last Admin     Soo Gabriele Mejia MD

## 2024-04-29 NOTE — PROGRESS NOTES
[facility]   Acute Rehabilitation Unit  Daily progress note    INTERVAL HISTORY  Weekend and therapy notes reviewed, no acute events reported.  Vascular surgery came to evaluate AKA site.  No acute concerns but did provide some modified recommendations for local wound care.  Patient was also noted to have ongoing loose stools, nausea, emesis.  Repeat C diff testing was negative and she was reportedly agreeable to trial gluten-free diet so that was modified.  Due to this her appetite has been fluctuating.  She describes no difficulties with sleep at nighttime.  Her pain is well-managed.  She is averaging about 1-2 hydromorphone pills per day.  No other complaints verbalized on today's visit.      PT:    Current Status:  Bed Mobility: Humaira   Transfer: Slideboard SBA bed<>w/c w/ min setup assist.  Gait: unable, unsafe  Stairs: not tested  Balance: Stable dynamic sitting.      Assessment: Continues to have liquid bowel incontinence w/ all slideboard transfers. Per discussion seems that pt will have near 24/7 assist if needed. Stating she will have to have conversation w/ son/brother/aunt to confirm level of ADL assist they are willing to provide, especially related to toileting.     OT:  Current Status:  ADLs:  Mobility: min A for slide board to WC  Grooming: INDup seated  Dressing: UB: set up, LB: set up in supine  Bathing: TBD  Toileting: liko lift to commode, working on SB to commode Ax1  IADLs: Pt does most IADL including caregiving for .   Vision/Cognition: intact     Assessment: Working on slide board transfers to commode. Updated board so pt can do these with nursing. Pt requires assist for clothing management but can do her own lydia cares. Pt incontinent of BM during transfer despite not feeling like she had to go.     MEDICATIONS  Current Facility-Administered Medications   Medication Dose Route Frequency Provider Last Rate Last Admin    amLODIPine (NORVASC) tablet  5 mg  5 mg Oral Daily Corrina Hurt PA-C   5 mg at 04/28/24 0800    carvedilol (COREG) tablet 6.25 mg  6.25 mg Oral BID w/meals Corrina Hurt PA-C   6.25 mg at 04/28/24 1743    cetirizine (zyrTEC) tablet 5 mg  5 mg Oral Daily Corrina Hurt PA-C   5 mg at 04/28/24 0755    clopidogrel (PLAVIX) tablet 75 mg  75 mg Oral Daily Corrina Hurt PA-C   75 mg at 04/28/24 0755    famotidine (PEPCID) tablet 20 mg  20 mg Oral Q48H Corrina Hurt PA-C   20 mg at 04/28/24 1629    fluticasone-vilanterol (BREO ELLIPTA) 200-25 MCG/ACT inhaler 1 puff  1 puff Inhalation Daily Corrina Hurt PA-C   1 puff at 04/26/24 0932    gabapentin (NEURONTIN) capsule 200 mg  200 mg Oral Once per day on Tuesday Thursday Saturday Corrina Hurt PA-C   200 mg at 04/27/24 2027    heparin ANTICOAGULANT injection 5,000 Units  5,000 Units Subcutaneous Q12H Corrina Hurt PA-C   5,000 Units at 04/28/24 2041    lactobacillus rhamnosus (GG) (CULTURELL) capsule 1 capsule  1 capsule Oral BID Corrina Hurt PA-C   1 capsule at 04/28/24 2041    miconazole (MICATIN) 2 % powder   Topical BID Corrina Hurt PA-C   Given at 04/28/24 2041    multivitamin RENAL (TRIPHROCAPS) capsule 1 capsule  1 capsule Oral Daily Corrina Hurt PA-C   1 capsule at 04/28/24 0755    nicotine (NICODERM CQ) 14 MG/24HR 24 hr patch 1 patch  1 patch Transdermal Daily Corrina Hurt PA-C   1 patch at 04/28/24 0755    rosuvastatin (CRESTOR) tablet 10 mg  10 mg Oral At Bedtime Corrina Hurt PA-C   10 mg at 04/28/24 2041    scopolamine (TRANSDERM) 72 hr patch 1 patch  1 patch Transdermal Q72H Corrina Hurt PA-C   1 patch at 04/26/24 1215    And    scopolamine (TRANSDERM-SCOP) Patch in Place   Transdermal Q8H Corrina Hurt PA-C              Current Facility-Administered Medications   Medication Dose Route Frequency Provider Last Rate Last Admin    acetaminophen (TYLENOL) tablet 500-1,000 mg  500-1,000  "mg Oral Q6H PRN Corrina Hurt PA-C   1,000 mg at 04/25/24 1743    diphenoxylate-atropine (LOMOTIL) 2.5-0.025 MG per tablet 1 tablet  1 tablet Oral 4x Daily PRN Corrina Hurt PA-C   1 tablet at 04/25/24 1941    HYDROmorphone (DILAUDID) tablet 2 mg  2 mg Oral Q6H PRN Corrina Hurt PA-C   2 mg at 04/28/24 1629    naloxone (NARCAN) injection 0.2 mg  0.2 mg Intravenous Q2 Min PRN Arun Pimentel MD        Or    naloxone (NARCAN) injection 0.4 mg  0.4 mg Intravenous Q2 Min PRN Arun Pimentel MD        Or    naloxone (NARCAN) injection 0.2 mg  0.2 mg Intramuscular Q2 Min PRN Arun Pimentel MD        Or    naloxone (NARCAN) injection 0.4 mg  0.4 mg Intramuscular Q2 Min PRN Arun Pimentel MD        nitroGLYcerin (NITROSTAT) sublingual tablet 0.4 mg  0.4 mg Sublingual Q5 Min PRN Corrina Hurt PA-C        ondansetron (ZOFRAN ODT) ODT tab 4 mg  4 mg Oral Q6H PRN Corrina Hurt PA-C   4 mg at 04/27/24 2008    polyethylene glycol (MIRALAX) powder 17 g  17 g Oral Daily PRN Corrina uHrt PA-C        senna-docusate (SENOKOT-S/PERICOLACE) 8.6-50 MG per tablet 1-2 tablet  1-2 tablet Oral BID PRN Corrina Hurt PA-C            PHYSICAL EXAM  BP (!) (P) 148/68 (BP Location: Left arm)   Pulse 69   Temp 98.1  F (36.7  C) (Oral)   Resp 16   Ht 1.549 m (5' 0.98\")   Wt 51.8 kg (114 lb 3.2 oz)   LMP  (LMP Unknown)   SpO2 92%   BMI 21.59 kg/m    Gen: NAD, up in chair  HEENT: NC/AT, MMM  Cardio: +dialysis catheter R chest  Pulm: non-labored on room air  Abd: soft, mild tenderness on deep palpation of of the right half of the abdomen, non-distended  Ext: some edema in RLE; charcot foot deformity on left; R surgical site not visualized on this date but dressing CDI.  Neuro/MSK: awake, alert, moving all extremities actively in bed      LABS  CBC RESULTS:   Recent Labs   Lab Test 04/27/24  0722 04/25/24  0540 04/20/24  1821 04/20/24  0645 04/18/24  0604 04/17/24  0636   WBC 4.3 4.4  --   --   --  8.5   RBC " 2.82* 2.95*  --   --   --  2.85*   HGB 8.7* 9.0* 8.6* 7.4*   < > 8.5*   HCT 27.2* 27.7*  --   --   --  25.9*   MCV 97 94  --   --   --  91   MCH 30.9 30.5  --   --   --  29.8   MCHC 32.0 32.5  --   --   --  32.8   RDW 18.5* 18.0*  --   --   --  16.9*    282  --  170  --  225    < > = values in this interval not displayed.       Last Basic Metabolic Panel:  Recent Labs   Lab Test 04/27/24  0722 04/26/24  0210 04/25/24  0540 04/23/24  0602   *  --  126* 124*   POTASSIUM 4.5  --  4.3 4.3   CHLORIDE 93*  --  95* 92*   CO2 25  --  24 22   ANIONGAP 9  --  7 10   GLC 80 87 86 74   BUN 29.0*  --  36.9* 52.7*   CR 2.67*  --  2.76* 2.83*   GFRESTIMATED 19*  --  18* 18*   SONIA 7.7*  --  7.7* 7.4*         Rehabilitation - continue comprehensive acute inpatient rehabilitation program with multidisciplinary approach including therapies, rehab nursing, and physiatry following. See interval history for updates.      ASSESSMENT AND PLAN  Shirley Hendricks is a 63 year old right hand dominant female with complicated past medical history including but not limited to PAD with multiple prior bilateral lower extremity vascular bypass grafts and thrombectomies (most recently 10/2023 right common femoral to proximal anterior tibial PTFE bypass graft), bilateral Charcot-Breonna-Tooth foot deformity, prior bilateral carotid endarterectomies, B-cell lymphoma, CAD (hx Mix3), hypertension, hyperlipidemia, GI bleed (12/2023), type 2 diabetes mellitus, COPD, tobacco use disorder, iron deficiency anemia, GERD, Celiac disease, Takotsubo cardiomyopathy, SVT, depression/anxiety and spongiotic dermatitis who was admitted on 3/29/24 with acute occlusion of right lower extremity arterial bypass graft now s/p right above-knee amputation 4/1/24, completed 4/9/24 with hospital course complicated by acute renal failure now on dialysis, sepsis, acute blood loss anemia, acute post-operative pain, nausea, loose stools, delirium, malnutrition, and  multiple electrolyte derangements.  She is now admitted to ARU on 4/22/24 for multidisciplinary rehabilitation and ongoing medical management.        Admission to acute inpatient rehab 04/22/24.    Impairment group code: Amputation 05.3 Unilateral LE AKA; emergent R AKA due to acute occlusion of RLE bypass graft         PT and OT 90 minutes of each daily for 6 days per week in addition to rehab nursing and close management of physiatrist.       Impairment of ADL's: Noted to have impaired activity tolerance, impaired balance, impaired strength, impaired weight shifting, and pain, all affecting her ability to safely and independently perform basic ADLs.  Goal for mod I with basic wheelchair-based ADLs with assist for bathing, as well as assist IADLs/heavier activities.     Impairment of mobility:  Noted to have impaired activity tolerance, impaired balance, impaired strength, impaired weight shifting, and pain, all affecting her ability to safely and independently perform basic mobility.  Goal for mod I with basic wheelchair-based mobility.     Impairment of cognition/language/swallow:  Noted to have dysphagia with goals for safe tolerance of least restrictive diet.  However, IP SLP noting did not anticipate further SLP services at ARU, no issues with tolerating regular diet.  Will not consult initially.  If any concern for difficulty tolerating current diet, can consult.     Medical Conditions  New actions/orders/updates for today are in blue.     S/p right AKA 4/1/24, completed 4/9/24 due to critical limb ischemia, acute occlusion of right common femoral artery to mid anterior tibial artery bypass graft   PAD/PVD with hx multiple prior vascular interventions to BLE  Bilateral Charcot-Breonna-Tooth foot deformities  Acute post-op pain  - Wound care: daily dressing changes to right AKA with xeroform and gauze with kerlix to keep wound protected  - Dehiscence of lateral surgical incision with some increased  serosanguinous drainage.  Also with multiple areas with necrotic appearance.  Overall similar to day of ARU admission although with more drainage.  Reached out vascular surgery BRANDON on 4/25 and 4/26 and staff surgeon on 4/26 without response.  Consult placed over weekend and they assessed on 4/28.  Appreciate assist.  Recommended ongoing daily dressing changes with Xeroform, gauze fluffs, loosely wrapped Kerlix.  DO NOT use ace wrap.  Stockinette can be placed into over-the-shoulder sling to prevent right AKA dressing from falling off during therapies.  - Also with significant itching at surgical site.  Recently seen by derm for dermatitis as below and recommended to trial zyrtec or claritin for itching.  Start zyrtec 5 mg daily on 4/25.  - NWB RLE  - Continue PTA L foot custom orthotic   - Continue Plavix 75 mg daily (given hx left bypass).  No ongoing need for Xarelto per vascular (no recent DVT or PE)  - Pain management: APAP 500-1000 mg q6h PRN, dilaudid 2 mg q6h PRN, gabapentin 200 mg 3x/week after HD (renally dosed).  Wean opioids as able.   - Continue PT/OT  - Follow up with vascular surgery in 2-4 weeks (~5/28)      Rehabilitation Medicine Wheelchair Face to Face      Diagnosis and ICD Code: 05.3 Unilateral LE AKA; emergent R AKA due to acute occlusion of RLE bypass graft, s/p AKA unilateral, right.   Currently has limited mobility due to pain, amputation, and RLE weakness related to baseline Charcot Breonna Tooth deformity, impaired sensation, poor activity tolerance.  Current transfer status: slideboard w/ CGA  Distance patient currently able to walk: Pt unable to walk.     Therapy team has ruled out the following less costly options:              Cane due to patient is unable to stand.              Walker due to patient is unable to stand.     Patient will use wheelchair to complete Mobility Related ADL's in the home including toileting, dressing, self cares, meal prep, and IADLs.  Without a wheelchair  "patient will be unable to safely move about their home.  This equipment will allow them to be out of bed, participate in home activities with their family, and it will be part of a fall prevention plan for safe mobility.   Patient's home will accommodate use of wheelchair.  Patient has a family member willing and able to assist as necessary with wheelchair.   Patient has not expressed that they are unwilling to use the wheel chair.     Patient needs a erika-height chair due to small body stature in order to self propel with their feet.      Arm and leg strength: LLE grossly 4/5. BUEs grossly 4+/5.      Gait/balance/coordination: Stable dynamic sitting balance. Pt is unable to stand.      Length of need: Lifetime     Current height/weight: 5' .984\"/114 lbs 3.17 oz           Acute blood loss anemia on anemia of chronic disease, hx iron deficiency  Retroperitoneal hematoma  Recent GI bleed (hospitalized 12/2023)  Required intermittent transfusion during this admission (3/30, 3/31, 4/2, 4/6, 4/9, 4/13).  Repeat CT abdomen on 4/20 with stable right retroperitoneal hematoma; no s/o active bleed.  4/27: Hgb stable at 8.7  - Continue epo/venofer with HD per nephrology  - Trend CBC every T/Th/Sat  - Transfuse for Hgb <7     Acute renal failure on HD  Hyponatremia  Normal baseline Cr, though per nephrology, suspect some modest CKD.  LIMA this admission, up to peak Cr 4/22 on 4/3.  Likely BAILEY +/- rhabdo +/- ischemic injury with no signs of recovery and now dialysis dependent (started 4/3/24).  S/p tunneled dialysis catheter placement 4/3/24.  4/29: Cr up to 3.20; Na remains stable/low at 126  - Nephrology consulted, appreciate ongoing assistance  - HD T/Th/Sat at ARU or per nephrology recs.  Plan for OP HD at [facility] T/Th/Sat  - Per nephrology, recommended diuretic challenge today.  S/p Lasix 100 mg PO  - Trend BMP/mag/phos on HD days  - Continue 1L fluid restriction  - Avoid NSAIDs/nephrotoxins, renally dose " medications     Bilateral pleural effusion  Noted on CT abdomen 4/20 with moderate b/l pleural effusion (left>right).  Has been intermittently requiring 1-2L supplemental oxygen.  Unclear if related to volume given new renal failure on HD, also baseline COPD.  - Monitor respiratory status, supplemental O2 by NC PRN to maintain sats >88%  - Consider thoracentesis if symptomatic or worsening hypoxia      Celiac disease  Colon distension   Loose stools, improving  Nausea/vomiting, improving  Severe malnutrition in context of acute on chronic illness  Pill cam study with [facility] in 3/2024 (due to recent GI bleed), which revealed mild non-erosive red tissue in the duodenum, some atrophic type appearance that could be celiac disease.  This was followed by celiac labs on 3/20/24 (Gliadin ab and tTG IgA antibody) which were positive and thus Celiac diagnosis was established and she was recommended for gluten-free diet.  This admission, noted to have colonic distension and circumferential wall thickening of the distal colon and rectum concerning for proctocolitis on CT abdomen.  Also with loose stools, nausea.  Despite this, patient declined gluten-free diet (switched on 4/18).  Noted to have improvement in loose stools and nausea at discharge to ARU per sending team.  However, has persisted with loose stools, intermittent nausea and vomiting.  Repeat cdiff on 4/28 negative.  Suspect symptoms are due to Celiac disease and non-compliance with gluten-free diet.  - Per discussion with weekend team on 4/28, patient willing to trial gluten-free diet, which was ordered.  On today's visit patient tells me she is completely agreeable to remain on a gluten-free diet as she does not want to get sick.  - RD consulted, appreciate assistance  - Continue scopolamine patch for now (started 4/18), wean as able  - PRN Zofran  - Continue probiotics BID  - Continue lomotil QID PRN  - Monitor symptoms  - Follow up with [facility] as  outpatient    Hypomagnesemia  Replaced mag IV on 4/23 with improvement to 2.1 and again on 4/27.  4/28: Mag WNL at 2.0  - Continue to trend     Hypocalcemia  4/23: Ca corrects to 8.7 for hypoalbuminemia  - Per pharmacy, was on PTA calcium carbonate/vit D PTA (had run out 1 week before admission), not ordered while at hospital.  If corrected Ca remains low, reach out to nephrology to consider if resumption indicated  - Continue to trend    CAD (hx MI x3)  HTN  Hyperlipidemia  Hx Takotsubo CM  Hx SVT  Last coronary angiogram completed 10/2023.  Recovered EF (55-60%) from ECHO 11/2023.  - Continue PTA Coreg 6.25 mg BID, amlodipine 5 mg daily  - Hold PTA lisinopril 10 mg daily due to renal failure  - PTA Jardiance 10 mg daily discontinued due to renal failure  - Rosuvastatin 10 mg daily resumed on ARU admission (previously held due to concern for rhabdo).  Monitor hepatic panel in 1 week    - Monitor BP  - Was to follow up with cardiology in Feb 2024, should be scheduled after discharge    Murmur  Noted on exam this admission by attending physiatrist.  Per chart review, intermittently documented in the past with questionable/subtle murmur (though not by cardiology).  Most recent echo 11/7/23 with trace mitral regurg, trace tricuspid regurg, and moderate trileaflet aortic sclerosis.  - Folllow up with cardiology as above     Hx bilateral carotid artery stenosis s/p bilateral carotid endarterectomies  - Follow up with vascular surgery for annual carotid duplex (last done on 1/4/24 with stable stenosis of right carotid bulb)     Diabetes mellitus, type II  Per chart review.  A1c this admission 5.2%.  PTA Jardiance and gabapentin discontinued due to acute renal failure.  BG well-controlled during this admission without need for insulin.  - Monitor BG periodically with labs     B-cell lymphoma, stage VALENTIN  Followed by [facility] (Dr. Barrow).  Mild radiographic progression on CT 1/26/24.  Given asymptomatic, plans at that  time for ongoing CT monitoring.  - Monitor for development of any B symptoms  - Trend CBC  - Follow up with oncology, repeat CT as planned in 7/2024     COPD  Tobacco use disorder  PTA smoking ~5 cigarettes per day  - Monitor respiratory status, supplemental O2 by NC PRN to maintain sats >88%  - Continue PTA Breo Ellipta  - Nicotine patch 14 mcg/day  - Ongoing education/resources for cessation     Adjustment disorder with mixed mood  Hx MDD/anxiety (per chart though patient denies)  Delirium, resolved  Not on medications PTA.  Patient denies any hx depression/anxiety but does note depressed mood in setting of recent AKA and significant home stressors.  - Psychology and spiritual services consulted, appreciate assist  - Monitor mood     GERD  PTA on omeprazole 20 mg daily  - Continue pepcid 20 mg q48 hours (renally dosed) given allergy to pantoprazole (formulary sub for omeprazole)     Spongiotic dermatitis  Recently seen by OP dermatology, recommended betamethasone cream BID PRN, not using regularly at hospital  - Consider resumption of topical steroids if recurrent symptoms  - Started Zyrtec 4/25 as above for itching near surgical site     Sacral wound: pressure injury (stage 2 vs 3), incontinence-associated dermatitis, moisture-associated skin damage  Assessed by WOCN on 4/22 at [facility] hospital.  - WOCN consulted for ongoing follow-up at ARU  - Wound care per WOCN orders  - Pressure reduction strategies     Adjustment to disability:  Clinical psychology to eval and treat if indicated  FEN: gluten free diet, 1000 mL fluid restriction  Bowel: incontinent, recent loose stools, will start patient on Imodium and will monitor progression.  Bladder: incontinent  DVT Prophylaxis: subcutaneous heparin  GI Prophylaxis: pepcid  Code: full, confirmed on admission  Disposition: goal for home  ELOS:  target 5/13/24  Follow up Appointments on Discharge: PCP in 1-2 weeks, vascular surgery in 2 weeks, [facility], nephrology (ongoing HD),  heme/onc ([facility], 7/2024)      MYRTLE Pimentel MD attest to spending over 35 minutes in completion of this document, reviewing chart, seeing and examining patient.      NPI: [id number]   Signature:    Date:

## 2024-04-29 NOTE — PLAN OF CARE
Goal Outcome Evaluation:      Plan of Care Reviewed With: patient    Overall Patient Progress: no changeOverall Patient Progress: no change     Patient alert and oriented x4, able to make needs known and use call light. Bed alarm on for safety. Denies SOB, chest pain and lightheadedness. CVC right dressing CDI, left PIV intact. P  -R AKA dressing CDI, Had incontinent BM this shift

## 2024-04-29 NOTE — PLAN OF CARE
"Goal Outcome Evaluation:         VS: BP (!) 158/65 (BP Location: Left leg, Patient Position: Semi-Myers's, Cuff Size: Adult Regular)   Pulse 68   Temp 98.1  F (36.7  C) (Oral)   Resp 18   Ht 1.549 m (5' 0.98\")   Wt 51.8 kg (114 lb 3.2 oz)   LMP  (LMP Unknown)   SpO2 92%   BMI 21.59 kg/m       O2: 92% RA   Output: Adequate- on HD   Last BM: 4/29/2024- loose   Activity: As tolerated; liko x2 or x1 w/cane   Skin: R Below knee amputee; scattered nicolás. UE bruises; sacral wound- open- mepilex   Pain: denies   CMS: A/O x4; Denies SOB, chest pain, headache, n/v, new numbness or tingling   Dressing: Mepilex on sacrum; R below knee amputee dressing   Diet: Gluten free; 1000 ml fluid restriction   LDA: L PIV saline locked; HD right chest    Equipment: cane   Plan: Continue POC   Additional Info: Mg- 2.0                     "

## 2024-04-30 ENCOUNTER — APPOINTMENT (OUTPATIENT)
Dept: PHYSICAL THERAPY | Facility: CLINIC | Age: 64
DRG: 559 | End: 2024-04-30
Attending: PHYSICAL MEDICINE & REHABILITATION
Payer: COMMERCIAL

## 2024-04-30 ENCOUNTER — APPOINTMENT (OUTPATIENT)
Dept: OCCUPATIONAL THERAPY | Facility: CLINIC | Age: 64
DRG: 559 | End: 2024-04-30
Attending: PHYSICAL MEDICINE & REHABILITATION
Payer: COMMERCIAL

## 2024-04-30 LAB
ALBUMIN SERPL BCG-MCNC: 2 G/DL (ref 3.5–5.2)
ALBUMIN SERPL BCG-MCNC: 2 G/DL (ref 3.5–5.2)
ALP SERPL-CCNC: 100 U/L (ref 40–150)
ALT SERPL W P-5'-P-CCNC: 24 U/L (ref 0–50)
ANION GAP SERPL CALCULATED.3IONS-SCNC: 10 MMOL/L (ref 7–15)
AST SERPL W P-5'-P-CCNC: 21 U/L (ref 0–45)
BILIRUB DIRECT SERPL-MCNC: <0.2 MG/DL (ref 0–0.3)
BILIRUB SERPL-MCNC: 0.4 MG/DL
BUN SERPL-MCNC: 30.5 MG/DL (ref 8–23)
CALCIUM SERPL-MCNC: 7.6 MG/DL (ref 8.8–10.2)
CHLORIDE SERPL-SCNC: 91 MMOL/L (ref 98–107)
CREAT SERPL-MCNC: 4.16 MG/DL (ref 0.51–0.95)
DEPRECATED HCO3 PLAS-SCNC: 23 MMOL/L (ref 22–29)
EGFRCR SERPLBLD CKD-EPI 2021: 11 ML/MIN/1.73M2
ERYTHROCYTE [DISTWIDTH] IN BLOOD BY AUTOMATED COUNT: 18.4 % (ref 10–15)
GLUCOSE SERPL-MCNC: 79 MG/DL (ref 70–99)
HCT VFR BLD AUTO: 27.2 % (ref 35–47)
HGB BLD-MCNC: 8.8 G/DL (ref 11.7–15.7)
MAGNESIUM SERPL-MCNC: 1.9 MG/DL (ref 1.7–2.3)
MCH RBC QN AUTO: 30.6 PG (ref 26.5–33)
MCHC RBC AUTO-ENTMCNC: 32.4 G/DL (ref 31.5–36.5)
MCV RBC AUTO: 94 FL (ref 78–100)
PHOSPHATE SERPL-MCNC: 3.8 MG/DL (ref 2.5–4.5)
PLATELET # BLD AUTO: 215 10E3/UL (ref 150–450)
POTASSIUM SERPL-SCNC: 4.4 MMOL/L (ref 3.4–5.3)
PROT SERPL-MCNC: 4.3 G/DL (ref 6.4–8.3)
RBC # BLD AUTO: 2.88 10E6/UL (ref 3.8–5.2)
SODIUM SERPL-SCNC: 124 MMOL/L (ref 135–145)
WBC # BLD AUTO: 3.3 10E3/UL (ref 4–11)

## 2024-04-30 PROCEDURE — 999N000150 HC STATISTIC PT MED CONFERENCE < 30 MIN

## 2024-04-30 PROCEDURE — 84100 ASSAY OF PHOSPHORUS: CPT | Performed by: PHYSICIAN ASSISTANT

## 2024-04-30 PROCEDURE — 90935 HEMODIALYSIS ONE EVALUATION: CPT

## 2024-04-30 PROCEDURE — 999N000125 HC STATISTIC PATIENT MED CONFERENCE < 30 MIN: Performed by: STUDENT IN AN ORGANIZED HEALTH CARE EDUCATION/TRAINING PROGRAM

## 2024-04-30 PROCEDURE — 36415 COLL VENOUS BLD VENIPUNCTURE: CPT | Performed by: PHYSICIAN ASSISTANT

## 2024-04-30 PROCEDURE — 80069 RENAL FUNCTION PANEL: CPT | Performed by: PHYSICIAN ASSISTANT

## 2024-04-30 PROCEDURE — 97535 SELF CARE MNGMENT TRAINING: CPT | Mod: GO | Performed by: STUDENT IN AN ORGANIZED HEALTH CARE EDUCATION/TRAINING PROGRAM

## 2024-04-30 PROCEDURE — G0463 HOSPITAL OUTPT CLINIC VISIT: HCPCS | Mod: 25

## 2024-04-30 PROCEDURE — 250N000013 HC RX MED GY IP 250 OP 250 PS 637: Performed by: PHYSICIAN ASSISTANT

## 2024-04-30 PROCEDURE — 258N000003 HC RX IP 258 OP 636: Performed by: INTERNAL MEDICINE

## 2024-04-30 PROCEDURE — 84450 TRANSFERASE (AST) (SGOT): CPT | Performed by: PHYSICIAN ASSISTANT

## 2024-04-30 PROCEDURE — 99232 SBSQ HOSP IP/OBS MODERATE 35: CPT | Mod: FS | Performed by: PHYSICIAN ASSISTANT

## 2024-04-30 PROCEDURE — 128N000003 HC R&B REHAB

## 2024-04-30 PROCEDURE — 250N000011 HC RX IP 250 OP 636: Performed by: PHYSICIAN ASSISTANT

## 2024-04-30 PROCEDURE — 99233 SBSQ HOSP IP/OBS HIGH 50: CPT | Mod: 24 | Performed by: INTERNAL MEDICINE

## 2024-04-30 PROCEDURE — 97530 THERAPEUTIC ACTIVITIES: CPT | Mod: GP

## 2024-04-30 PROCEDURE — 250N000011 HC RX IP 250 OP 636: Performed by: INTERNAL MEDICINE

## 2024-04-30 PROCEDURE — 85027 COMPLETE CBC AUTOMATED: CPT | Performed by: PHYSICIAN ASSISTANT

## 2024-04-30 PROCEDURE — 634N000001 HC RX 634: Mod: JZ | Performed by: INTERNAL MEDICINE

## 2024-04-30 PROCEDURE — 83735 ASSAY OF MAGNESIUM: CPT | Performed by: PHYSICIAN ASSISTANT

## 2024-04-30 RX ADMIN — SILVER SULFADIAZINE: 10 CREAM TOPICAL at 08:21

## 2024-04-30 RX ADMIN — FAMOTIDINE 20 MG: 20 TABLET ORAL at 15:38

## 2024-04-30 RX ADMIN — MICONAZOLE NITRATE: 20 POWDER TOPICAL at 08:46

## 2024-04-30 RX ADMIN — SODIUM CHLORIDE 250 ML: 9 INJECTION, SOLUTION INTRAVENOUS at 11:04

## 2024-04-30 RX ADMIN — CETIRIZINE HYDROCHLORIDE 5 MG: 5 TABLET ORAL at 08:17

## 2024-04-30 RX ADMIN — ROSUVASTATIN CALCIUM 10 MG: 5 TABLET, COATED ORAL at 21:15

## 2024-04-30 RX ADMIN — IRON SUCROSE 100 MG: 20 INJECTION, SOLUTION INTRAVENOUS at 13:01

## 2024-04-30 RX ADMIN — FLUTICASONE FUROATE AND VILANTEROL TRIFENATATE 1 PUFF: 200; 25 POWDER RESPIRATORY (INHALATION) at 09:48

## 2024-04-30 RX ADMIN — CARVEDILOL 6.25 MG: 6.25 TABLET, FILM COATED ORAL at 18:22

## 2024-04-30 RX ADMIN — SODIUM CHLORIDE 300 ML: 9 INJECTION, SOLUTION INTRAVENOUS at 11:04

## 2024-04-30 RX ADMIN — CARVEDILOL 6.25 MG: 6.25 TABLET, FILM COATED ORAL at 08:16

## 2024-04-30 RX ADMIN — Medication 1 CAPSULE: at 08:17

## 2024-04-30 RX ADMIN — Medication 1 CAPSULE: at 21:16

## 2024-04-30 RX ADMIN — HYDROMORPHONE HYDROCHLORIDE 2 MG: 2 TABLET ORAL at 18:22

## 2024-04-30 RX ADMIN — DIPHENOXYLATE HYDROCHLORIDE AND ATROPINE SULFATE 1 TABLET: 2.5; .025 TABLET ORAL at 15:38

## 2024-04-30 RX ADMIN — ONDANSETRON 4 MG: 4 TABLET, ORALLY DISINTEGRATING ORAL at 09:48

## 2024-04-30 RX ADMIN — NICOTINE 1 PATCH: 14 PATCH, EXTENDED RELEASE TRANSDERMAL at 08:21

## 2024-04-30 RX ADMIN — GABAPENTIN 200 MG: 100 CAPSULE ORAL at 21:28

## 2024-04-30 RX ADMIN — HEPARIN SODIUM 5000 UNITS: 5000 INJECTION, SOLUTION INTRAVENOUS; SUBCUTANEOUS at 08:17

## 2024-04-30 RX ADMIN — HEPARIN SODIUM 5000 UNITS: 5000 INJECTION, SOLUTION INTRAVENOUS; SUBCUTANEOUS at 21:16

## 2024-04-30 RX ADMIN — DIPHENOXYLATE HYDROCHLORIDE AND ATROPINE SULFATE 1 TABLET: 2.5; .025 TABLET ORAL at 09:48

## 2024-04-30 RX ADMIN — AMLODIPINE BESYLATE 5 MG: 5 TABLET ORAL at 08:17

## 2024-04-30 RX ADMIN — EPOETIN ALFA-EPBX 10000 UNITS: 10000 INJECTION, SOLUTION INTRAVENOUS; SUBCUTANEOUS at 13:02

## 2024-04-30 RX ADMIN — Medication: at 11:04

## 2024-04-30 RX ADMIN — CLOPIDOGREL BISULFATE 75 MG: 75 TABLET ORAL at 08:17

## 2024-04-30 ASSESSMENT — ACTIVITIES OF DAILY LIVING (ADL)
ADLS_ACUITY_SCORE: 34
ADLS_ACUITY_SCORE: 36
ADLS_ACUITY_SCORE: 34
ADLS_ACUITY_SCORE: 36
ADLS_ACUITY_SCORE: 34
ADLS_ACUITY_SCORE: 36
ADLS_ACUITY_SCORE: 34
ADLS_ACUITY_SCORE: 36
ADLS_ACUITY_SCORE: 35
ADLS_ACUITY_SCORE: 34
ADLS_ACUITY_SCORE: 36
ADLS_ACUITY_SCORE: 34
ADLS_ACUITY_SCORE: 36
ADLS_ACUITY_SCORE: 34

## 2024-04-30 NOTE — PLAN OF CARE
Goal Outcome Evaluation:      Plan of Care Reviewed With: patient    Overall Patient Progress: no change    Outcome Evaluation: will have KD tomorrow 4/30 after am therapies.    Orientation:A/O x4, VSS on RA.    Bowel:mixed continence  LBM 4/29.    Bladder:makes minimal urine, on KD.    Pain:c/o Right AKA pain, prn dilaudid given x1 this shift at HS.   Ambulation/Transfers:up with assist 1-2 with slide board or [place].      Diet/ Liquids:3gm NA, thin liquids 1L FR.  Takes pills whole.   Tubes/ Lines/ Drains: Left FA PIV SL.  Right chest CVC for KD.    Skin: nicotine patch to right shoulder, scopolamine patch behind right ear. Right AKA dressing changed on days per orders.  Mepilex to coccxy c/d/I.    Bed alarm on for safety, call light within reach. Continue with POC.

## 2024-04-30 NOTE — PLAN OF CARE
Discharge Plan: home with HH PT      Precautions: fall, NWB of RLE, sacral wound, elevate RLE in bed    Current Status:  Bed Mobility: Humaira   Transfer: Slideboard SBA bed<>w/c w/ min setup assist.  Gait: unable, unsafe  Stairs: not tested  Balance: Stable dynamic sitting.     Assessment: Participation limited d/t emesis and toileting.     Other Barriers to Discharge (DME, Family Training, etc):   DME: K3 w/c (order to be faxed 4/29), slideboard, bedrail.     Family training: TBD. Car tx, bumping up 3STE in w/c.

## 2024-04-30 NOTE — PLAN OF CARE
Discharge Planner Post-Acute Rehab OT:     Discharge Plan: home with assist and HH OT     Precautions: fall, NWB of RLE, dialysis, sacral wound, elevate residual limb when in bed    Current Status:  ADLs:  Mobility: CGA for slide board to WC  Grooming: INDup seated  Dressing: UB: set up, LB: set up in supine, assist for brace and shoe  Bathing: TBD  Toileting: SB to commode Ax1  IADLs: Pt does most IADL including caregiving for .   Vision/Cognition: intact    Assessment: Pt still struggling with incontinence which interferes with advancing pt's independence with LB cares.   -45 min. 2nd OT session 2/2 nausea/vomiting.    Other Barriers to Discharge (DME, Family Training, etc): DME, family training, pt has support from multiple family members and will need to schedule family training since she will most likely still require assist with toileting.

## 2024-04-30 NOTE — PROGRESS NOTES
HEMODIALYSIS TREATMENT NOTE    Date: 4/30/2024  Time: 1:27 PM    Data:  Weight change: 1.8 kg  Ultrafiltration - Post Run Net Total Removed (mL): 1800 mL  Vascular Access Status: patent  Dialyzer Rinse: Light, Streaked  Total Blood Volume Processed: 76 L Liters  Total Dialysis (Treatment) Time: 3.5 Hours    Lab:   No    Interventions:  Dr. Mejia verbal ordered Na 135 bath  Epoetin  Venofer  Venous clotted 1 hr left of tx, majority of blood rinsed back, approximately 50 ml blood loss, Dr. Mejia notified  CVC dressing changed  Last 5 mins tx pt became hypotensive, dizzy, rinsed back early, BP HPT post rinse back    Assessment:  Pt ran for 3.5 hrs on a K3/ Ca 3 bath with a /  and 2 L pulled with no complications. RCVC patent both lumens with good flow. Pt rinsed back, CVC NS locked, and caps changed. CVC dressing changed with no abnormal findings. Pt alert and oriented x4. Pt rested during majority of tx. Report given to PCN.    Plan:    Per renal team

## 2024-04-30 NOTE — PROGRESS NOTES
[facility] Nurse Inpatient Assessment     Consulted for: Coccyx    Patient History (according to provider note(s):      Per Corrina Hurt PA-C on 4/22/2024: Shirley Hendricks is a 63 year old right hand dominant female with complicated past medical history including but not limited to PAD with multiple prior bilateral lower extremity vascular bypass grafts and thrombectomies (most recently 10/2023 right common femoral to proximal anterior tibial PTFE bypass graft), bilateral Charcot-Breonna-Tooth foot deformity, prior bilateral carotid endarterectomies, B-cell lymphoma, CAD (hx Mix3), hypertension, hyperlipidemia, GI bleed (12/2023), type 2 diabetes mellitus, COPD, tobacco use disorder, iron deficiency anemia, GERD, Celiac disease, Takotsubo cardiomyopathy, SVT, depression/anxiety and spongiotic dermatitis who was admitted on 3/29/24 with acute occlusion of right lower extremity arterial bypass graft now s/p right above-knee amputation 4/1/24, completed 4/9/24 with hospital course complicated by acute renal failure now on dialysis, sepsis, acute blood loss anemia, acute post-operative pain, nausea, loose stools, delirium, malnutrition, and multiple electrolyte derangements.  She is now admitted to ARU on 4/22/24 for multidisciplinary rehabilitation and ongoing medical management.        Admission to acute inpatient rehab 04/22/24.      Assessment:      Areas visualized during today's visit: Coccyx, buttocks    Pressure Injury Location: Sacroccocygeal        4/22    Last photo: 4/30/24  Wound type: Pressure Injury, Incontinence Associated Dermatitis (IAD), and Moisture Associated Skin Damage (MASD)     Pressure Injury Stage: either Stage 2 or 3 deferring definitive staging until wound base has evolved, hospital acquired from recent admission at [facility].      Wound history/plan of care:  MASD related to incontinence of bladder and bowels. intergluteal crease with area of linear  "erythema, skin is intact. This line of erythema continues up gluteal crease to sacral area and evolves to a localized round area of erythema that is blanchable. To the center of this area is an open pressure related injury stage 2 vs 3, unable to determine staging at this time, area is positional over bone and within skin crevice, very little adipose tissue to this area. Base of wound with dermis to edges and yellow tissue. Patient found with brief on in bed, discussed indication for brief use, she agreed to remove due to \" I don't even want to use them but I can't get up because of my leg\". Patient has Right AKA, discussed use of bedpan and/or transferring to commode if able, she was agreeable to try this plan.   4/30: pt prefers to have mepilex in place vs paste for when he is down at therapy. Will change orders and re-evaluate at next visit. Spent much time discussing importance of repositioning q2h     Wound base: 100 % blanchable , non-blanchable, erythema, dermis, and fibrin vs adipose ,        Palpation of the wound bed: normal      Drainage: scant     Description of drainage: serous     Measurements (length x width x depth, in cm) 1.2  x 0.5  x  0.2 cm      Periwound skin: Intact and Erythema- blanchable      Color: normal and consistent with surrounding tissue      Temperature: normal   Odor: none  Pain: denies , none  Pain intervention prior to dressing change: patient tolerated well, no significant pain present , soaking, and slow and gentle cares   Treatment goal: Heal , Infection control/prevention, Maintain (prevention of deterioration), Protection, and Promote epidermal migration  STATUS: initial assessment  Supplies ordered: at bedside, supplies stored on unit, discussed with RN, and discussed with patient     My PI Risk Assessment     Sensory Perception: 4 - No impairment     Moisture: 3 - Occasionally moist      Activity: 2 - Chairfast     Mobility: 3 - Slightly limited      Nutrition: 3 - " "Adequate     Friction/Shear: 1 - Problem     TOTAL: 16      Treatment Plan:     Sacral wound(s): Every 3 days and PRN if soiled  Cleanse the area with NS and pat dry.  Apply No sting film barrier to periwound skin.  Cover wound with Sacral Mepilex (#021666)- fold like a taco with pointed end towards head for best fit  Ensure pt has Ritchie-cushion (#310650) while sitting up in the chair.  Use only one Covidien pad in between mattress and pt.   No brief while in bed.  Add Low air loss pump to isoflex support surface and rotate side to side when in bed  Strict PIP measures  FYI- If pt has constant incontinent loose stools needing dressing changes Q shift please discontinue the Mepilex dressing and apply criticaid barrier paste BID and PRN.    Ensure pt has Ritchie-cushion (#234796) while sitting up in the chair.  Use only one Covidien pad in between mattress and pt.   No brief while in bed.  Add Low air loss pump to isoflex support surface  Strict PIP measures    Pressure Injury Prevention (PIP) Plan:  If patient is declining pressure injury prevention interventions: Explore reason why and address patient's concerns, Educate on pressure injury risk and prevention intervention(s), If patient is still declining, document \"informed refusal\" , and Ensure Care team is aware ( provider, charge nurse, etc)  Mattress: Follow bed algorithm, reassess daily and order specialty mattress, if indicated.  HOB: Maintain at or below 30 degrees, unless contraindicated  Repositioning in bed: Every 1-2 hours , Left/right positioning; avoid supine, Raise foot of bed prior to raising head of bed, to reduce patient sliding down (shear), and Frequent microturns using TAPS wedges, as patient tolerates  Heels: Keep elevated off mattress and Pillows under calves  Protective Dressing: Sacral Mepilex for prevention (#361692),  especially for the agitated patient   Positioning Equipment: TAPS wedges (#077160) to help maintain 30 degree side lying position "   Chair positioning: Chair cushion (#256076) , Assist patient to reposition hourly, and Do NOT use a donut for sitting (this increases pressure to smaller area and creates a higher potential for injury)    If patient has a buttock pressure injury, or high risk for PI use chair cushion or SPS.  Moisture Management: Perineal cleansing /protection: Follow Incontinence Protocol, Avoid brief in bed, Clean and dry skin folds with bathing , Consider InterDry (#605563) between folds, and Moisturize dry skin  Under Devices: Inspect skin under all medical devices during skin inspection , Ensure tubes are stabilized without tension, and Ensure patient is not lying on medical devices or equipment when repositioned  Ask provider to discontinue device when no longer needed.      Orders: Reviewed and Updated    RECOMMEND PRIMARY TEAM ORDER: None, at this time  Education provided: importance of repositioning, plan of care, wound progress, Infection prevention , Moisture management, Hygiene, and Off-loading pressure  Discussed plan of care with: Patient and Nurse  WOC nurse follow-up plan: twice weekly  Notify WOC if wound(s) deteriorate.  Nursing to notify the Provider(s) and re-consult the WOC Nurse if new skin concern.    DATA:     Current support surface: Standard  Standard gel/foam mattress (IsoFlex, Atmos air, etc)  Containment of urine/stool: Incontinence Protocol, Brief, Incontinent pad in bed, and recommending NICOLE pump be added to isolflex support surface for moisture management  BMI: Body mass index is 21.59 kg/m .   Active diet order: Orders Placed This Encounter      Gluten Free Diet     Output: I/O last 3 completed shifts:  In: 1135 [P.O.:1135]  Out: -      Labs:   Recent Labs   Lab 04/30/24  0653   ALBUMIN 2.0*   HGB 8.8*   WBC 3.3*     Pressure injury risk assessment:   Sensory Perception: 3-->slightly limited  Moisture: 3-->occasionally moist  Activity: 2-->chairfast  Mobility: 2-->very limited  Nutrition:  2-->probably inadequate  Friction and Shear: 2-->potential problem  Cesar Score: 14    Xenia Pelayo RN BSN CWOCN  Pager no longer is use, please contact through [employer] group: [facility] Nurse [place]  Dept. Office Number: [phone]

## 2024-04-30 NOTE — PROGRESS NOTES
[facility]   Acute Rehabilitation Unit  Daily progress note    INTERVAL HISTORY  Shirley Hendricks was seen at bedside this morning during team rounds.  No acute events reported overnight.  However, this morning patient notes onset of nausea and vomiting abruptly after having her morning medications.  She notes she ate only a small amount this morning.  She had one loose bowel movement this morning.  She denies other concerns or questions at this time.  She is going to dialysis this afternoon.    With therapies, she is making progress and now doing some slide board transfers.  While she continues to note pain, this has not been limiting during therapies, though she has been somewhat limited by bowels/symptoms.  For full functional updates, see team rounds note from today.    MEDICATIONS  Current Facility-Administered Medications   Medication Dose Route Frequency Provider Last Rate Last Admin    amLODIPine (NORVASC) tablet 5 mg  5 mg Oral Daily Corrina Hurt PA-C   5 mg at 04/29/24 0813    carvedilol (COREG) tablet 6.25 mg  6.25 mg Oral BID w/meals Corrina Hurt PA-C   6.25 mg at 04/29/24 1813    cetirizine (zyrTEC) tablet 5 mg  5 mg Oral Daily Corrina Hurt PA-C   5 mg at 04/29/24 0813    clopidogrel (PLAVIX) tablet 75 mg  75 mg Oral Daily Corrina Hurt PA-C   75 mg at 04/29/24 0814    famotidine (PEPCID) tablet 20 mg  20 mg Oral Q48H Corrina Hurt PA-C   20 mg at 04/28/24 1629    fluticasone-vilanterol (BREO ELLIPTA) 200-25 MCG/ACT inhaler 1 puff  1 puff Inhalation Daily Corrina Hurt PA-C   1 puff at 04/26/24 0932    gabapentin (NEURONTIN) capsule 200 mg  200 mg Oral Once per day on Tuesday Thursday Saturday Corrina Hurt PA-C   200 mg at 04/27/24 2027    heparin ANTICOAGULANT injection 5,000 Units  5,000 Units Subcutaneous Q12H Corrina Hurt PA-C   5,000 Units at 04/29/24 2027    lactobacillus rhamnosus (GG)  (CULTURELL) capsule 1 capsule  1 capsule Oral BID Corrina Hurt PA-C   1 capsule at 04/29/24 2027    miconazole (MICATIN) 2 % powder   Topical BID Corrina Hurt PA-C   Given at 04/29/24 0817    multivitamin RENAL (TRIPHROCAPS) capsule 1 capsule  1 capsule Oral Daily Corrina Hurt PA-C   1 capsule at 04/29/24 0814    nicotine (NICODERM CQ) 14 MG/24HR 24 hr patch 1 patch  1 patch Transdermal Daily Corrina Hurt PA-C   1 patch at 04/29/24 0813    rosuvastatin (CRESTOR) tablet 10 mg  10 mg Oral At Bedtime Corrina Hurt PA-C   10 mg at 04/29/24 2027    scopolamine (TRANSDERM) 72 hr patch 1 patch  1 patch Transdermal Q72H Corrina Hurt PA-C   1 patch at 04/29/24 0814    And    scopolamine (TRANSDERM-SCOP) Patch in Place   Transdermal Q8H Corrina Hurt PA-C        silver sulfADIAZINE (SILVADENE) 1 % cream   Topical Daily Gala Lo MD   Given at 04/29/24 1534          Current Facility-Administered Medications   Medication Dose Route Frequency Provider Last Rate Last Admin    acetaminophen (TYLENOL) tablet 500-1,000 mg  500-1,000 mg Oral Q6H PRN Corrina Hurt PA-C   1,000 mg at 04/29/24 1427    diphenoxylate-atropine (LOMOTIL) 2.5-0.025 MG per tablet 1 tablet  1 tablet Oral 4x Daily PRN Corrina Hurt PA-C   1 tablet at 04/29/24 1206    HYDROmorphone (DILAUDID) tablet 2 mg  2 mg Oral Q6H PRN Corrina Hurt PA-C   2 mg at 04/29/24 2033    naloxone (NARCAN) injection 0.2 mg  0.2 mg Intravenous Q2 Min PRN Arun Pimentel MD        Or    naloxone (NARCAN) injection 0.4 mg  0.4 mg Intravenous Q2 Min PRN Arun Pimentel MD        Or    naloxone (NARCAN) injection 0.2 mg  0.2 mg Intramuscular Q2 Min PRN Arun Pimentel MD        Or    naloxone (NARCAN) injection 0.4 mg  0.4 mg Intramuscular Q2 Min PRN Arun Pimentel MD        nitroGLYcerin (NITROSTAT) sublingual tablet 0.4 mg  0.4 mg Sublingual Q5 Min PRN Corrina Hurt PA-C        ondansetron (ZOFRAN ODT) ODT tab  "4 mg  4 mg Oral Q6H PRN Corrina Hurt PA-C   4 mg at 04/27/24 2008    polyethylene glycol (MIRALAX) powder 17 g  17 g Oral Daily PRN Corrina Hurt PA-C        senna-docusate (SENOKOT-S/PERICOLACE) 8.6-50 MG per tablet 1-2 tablet  1-2 tablet Oral BID PRN Corrina Hurt PA-C            PHYSICAL EXAM  /56 (BP Location: Left arm)   Pulse 60   Temp 98.4  F (36.9  C) (Oral)   Resp 18   Ht 1.549 m (5' 0.98\")   Wt 51.8 kg (114 lb 3.2 oz)   LMP  (LMP Unknown)   SpO2 93%   BMI 21.59 kg/m    Gen: NAD, up in chair  HEENT: NC/AT, MMM  Cardio: +dialysis catheter R chest  Pulm: non-labored on room air  Abd: non-distended  Ext: some edema in RLE; charcot foot deformity on left; R surgical site not visualized on this date  Neuro/MSK: awake, alert, moving all extremities actively in chair  *Full exam deferred today for conversation      LABS  CBC RESULTS:   Recent Labs   Lab Test 04/30/24  0653 04/27/24  0722 04/25/24  0540   WBC 3.3* 4.3 4.4   RBC 2.88* 2.82* 2.95*   HGB 8.8* 8.7* 9.0*   HCT 27.2* 27.2* 27.7*   MCV 94 97 94   MCH 30.6 30.9 30.5   MCHC 32.4 32.0 32.5   RDW 18.4* 18.5* 18.0*    272 282       Last Basic Metabolic Panel:  Recent Labs   Lab Test 04/30/24  0653 04/29/24  0615 04/27/24  0722   * 126* 127*   POTASSIUM 4.4 4.4 4.5   CHLORIDE 91* 92* 93*   CO2 23 24 25   ANIONGAP 10 10 9   GLC 79 79 80   BUN 30.5* 21.0 29.0*   CR 4.16* 3.20* 2.67*   GFRESTIMATED 11* 15* 19*   SONIA 7.6* 7.6* 7.7*         Rehabilitation - continue comprehensive acute inpatient rehabilitation program with multidisciplinary approach including therapies, rehab nursing, and physiatry following. See interval history for updates.      ASSESSMENT AND PLAN  Shirley Hendricks is a 63 year old right hand dominant female with complicated past medical history including but not limited to PAD with multiple prior bilateral lower extremity vascular bypass grafts and thrombectomies (most recently 10/2023 right " common femoral to proximal anterior tibial PTFE bypass graft), bilateral Charcot-Breonna-Tooth foot deformity, prior bilateral carotid endarterectomies, B-cell lymphoma, CAD (hx Mix3), hypertension, hyperlipidemia, GI bleed (12/2023), type 2 diabetes mellitus, COPD, tobacco use disorder, iron deficiency anemia, GERD, Celiac disease, Takotsubo cardiomyopathy, SVT, depression/anxiety and spongiotic dermatitis who was admitted on 3/29/24 with acute occlusion of right lower extremity arterial bypass graft now s/p right above-knee amputation 4/1/24, completed 4/9/24 with hospital course complicated by acute renal failure now on dialysis, sepsis, acute blood loss anemia, acute post-operative pain, nausea, loose stools, delirium, malnutrition, and multiple electrolyte derangements.  She is now admitted to ARU on 4/22/24 for multidisciplinary rehabilitation and ongoing medical management.        Admission to acute inpatient rehab 04/22/24.    Impairment group code: Amputation 05.3 Unilateral LE AKA; emergent R AKA due to acute occlusion of RLE bypass graft         PT and OT 90 minutes of each daily for 6 days per week in addition to rehab nursing and close management of physiatrist.       Impairment of ADL's: Noted to have impaired activity tolerance, impaired balance, impaired strength, impaired weight shifting, and pain, all affecting her ability to safely and independently perform basic ADLs.  Goal for mod I with basic wheelchair-based ADLs with assist for bathing, as well as assist IADLs/heavier activities.     Impairment of mobility:  Noted to have impaired activity tolerance, impaired balance, impaired strength, impaired weight shifting, and pain, all affecting her ability to safely and independently perform basic mobility.  Goal for mod I with basic wheelchair-based mobility.     Impairment of cognition/language/swallow:  Noted to have dysphagia with goals for safe tolerance of least restrictive diet.  However, IP SLP  noting did not anticipate further SLP services at ARU, no issues with tolerating regular diet.  Will not consult initially.  If any concern for difficulty tolerating current diet, can consult.     Medical Conditions  New actions/orders/updates for today are in blue.     S/p right AKA 4/1/24, completed 4/9/24 due to critical limb ischemia, acute occlusion of right common femoral artery to mid anterior tibial artery bypass graft   PAD/PVD with hx multiple prior vascular interventions to BLE  Bilateral Charcot-Breonna-Tooth foot deformities  Acute post-op pain  - Wound care: daily dressing changes to right AKA with xeroform and gauze with kerlix to keep wound protected  - Dehiscence of lateral surgical incision with some increased serosanguinous drainage.  Also with multiple areas with necrotic appearance.  Overall similar to day of ARU admission although with more drainage.  Reached out vascular surgery BR[facility] on 4/25 and 4/26 and staff surgeon on 4/26 without response.  Consult placed over weekend and they assessed on 4/28.  Appreciate assist.  Recommended ongoing daily dressing changes with Xeroform, gauze fluffs, loosely wrapped Kerlix.  DO NOT use ace wrap.  Stockinette can be placed into over-the-shoulder sling to prevent right AKA dressing from falling off during therapies.  Silvadene topical also added per vascular surgery on 4/29, to be applied daily to incision.  - Also with significant itching at surgical site.  Recently seen by derm for dermatitis as below and recommended to trial zyrtec or claritin for itching.  Started zyrtec 5 mg daily on 4/25.  - NWB RLE  - Continue PTA L foot custom orthotic   - Continue Plavix 75 mg daily (given hx left bypass).  No ongoing need for Xarelto per vascular (no recent DVT or PE)  - Pain management: APAP 500-1000 mg q6h PRN, dilaudid 2 mg q6h PRN, gabapentin 200 mg 3x/week after HD (renally dosed).  Wean opioids as able.   - Continue PT/OT  - Follow up with vascular surgery in  2-4 weeks (~5/28)     Acute blood loss anemia on anemia of chronic disease, hx iron deficiency  Retroperitoneal hematoma  Recent GI bleed (hospitalized 12/2023)  Required intermittent transfusion during this admission (3/30, 3/31, 4/2, 4/6, 4/9, 4/13).  Repeat CT abdomen on 4/20 with stable right retroperitoneal hematoma; no s/o active bleed.  4/30: Hgb stable at 8.8  - Continue epo/venofer with HD per nephrology  - Trend CBC every T/Th/Sat  - Transfuse for Hgb <7     Acute renal failure on HD  Hyponatremia  Normal baseline Cr, though per nephrology, suspect some modest CKD.  LIMA this admission, up to peak Cr 4/22 on 4/3.  Likely BAILEY +/- rhabdo +/- ischemic injury with no signs of recovery and now dialysis dependent (started 4/3/24).  S/p tunneled dialysis catheter placement 4/3/24.  4/30: Cr up to 4.16; Na slightly lower at 124  - Nephrology consulted, appreciate ongoing assistance  - HD T/Th/Sat at ARU or per nephrology recs.  Plan [facility] OP [facility] at [facility]Th[facility]Sat  - Per nephrology, s/p diuretic challenge (Lasix 100 mg PO) on 4/29.  Will await further recs from nephrology.  - Trend BMP/mag/phos on HD days  - Continue 1L fluid restriction  - Avoid NSAIDs/nephrotoxins, renally dose medications     Bilateral pleural effusion  Noted on CT abdomen 4/20 with moderate b/l pleural effusion (left>right).  Has been intermittently requiring 1-2L supplemental oxygen.  Unclear if related to volume given new renal failure on HD, also baseline COPD.  - Monitor respiratory status, supplemental O2 by NC PRN to maintain sats >88%  - Consider thoracentesis if symptomatic or worsening hypoxia      Celiac disease  Colon distension   Loose stools, improving  Nausea/vomiting, improving  Severe malnutrition in context of acute on chronic illness  Pill cam study with [facility] in 3/2024 (due to recent GI bleed), which revealed mild non-erosive red tissue in the duodenum, some atrophic type appearance that could be celiac disease.  This  was followed by celiac labs on 3/20/24 (Gliadin ab and tTG IgA antibody) which were positive and thus Celiac diagnosis was established and she was recommended for gluten-free diet.  This admission, noted to have colonic distension and circumferential wall thickening of the distal colon and rectum concerning for proctocolitis on CT abdomen.  Also with loose stools, nausea.  Despite this, patient declined gluten-free diet (switched on 4/18).  Noted to have improvement in loose stools and nausea at discharge to ARU per sending team.  However, has persisted with loose stools, intermittent nausea and vomiting.  Repeat cdiff on 4/28 negative.  Suspect symptoms are due to Celiac disease and non-compliance with gluten-free diet.  - Per discussion with weekend team on 4/28, patient willing to trial gluten-free diet, which was ordered.   - RD consulted, appreciate assistance  - Continue scopolamine patch for now (started 4/18), wean as able  - PRN Zofran  - Still having intermittent nausea/vomiting and loose stools.  Is doing better with gluten-free diet though still some outside foods.  Hopeful for improvement with strict adherence to this diet.  Continue zofran PRN + lomotil PRN.  - Continue probiotics BID  - Continue lomotil QID PRN  - Monitor symptoms  - Follow up with [facility] as outpatient    Hypomagnesemia  Replaced mag IV on 4/23 with improvement to 2.1 and again on 4/27.  4/30: Mag WNL at 1.9  - Continue to trend     Hypocalcemia  4/30: Ca corrects to 8.8 (low end of normal) for hypoalbuminemia  - Per pharmacy, was on PTA calcium carbonate/vit D PTA (had run out 1 week before admission), not ordered while at hospital.  If corrected Ca remains low, reach out to nephrology to consider if resumption indicated  - Continue to trend    CAD (hx MI x3)  HTN  Hyperlipidemia  Hx Takotsubo CM  Hx SVT  Last coronary angiogram completed 10/2023.  Recovered EF (55-60%) from ECHO 11/2023.  - Continue PTA Coreg 6.25 mg BID, amlodipine 5  mg daily  - Hold PTA lisinopril 10 mg daily due to renal failure  - PTA Jardiance 10 mg daily discontinued due to renal failure  - Rosuvastatin 10 mg daily resumed on ARU admission (previously held due to concern for rhabdo).  Monitor hepatic panel in 1 week    - Monitor BP  - Was to follow up with cardiology in Feb 2024, should be scheduled after discharge    Murmur  Noted on exam this admission by attending physiatrist.  Per chart review, intermittently documented in the past with questionable/subtle murmur (though not by cardiology).  Most recent echo 11/7/23 with trace mitral regurg, trace tricuspid regurg, and moderate trileaflet aortic sclerosis.  - Folllow up with cardiology as above     Hx bilateral carotid artery stenosis s/p bilateral carotid endarterectomies  - Follow up with vascular surgery for annual carotid duplex (last done on 1/4/24 with stable stenosis of right carotid bulb)     Diabetes mellitus, type II  Per chart review.  A1c this admission 5.2%.  PTA Jardiance and gabapentin discontinued due to acute renal failure.  BG well-controlled during this admission without need for insulin.  - Monitor BG periodically with labs     B-cell lymphoma, stage VALENTIN  Followed by [facility] (Dr. Barrow).  Mild radiographic progression on CT 1/26/24.  Given asymptomatic, plans at that time for ongoing CT monitoring.  - Monitor for development of any B symptoms  - Trend CBC  - Follow up with oncology, repeat CT as planned in 7/2024     COPD  Tobacco use disorder  PTA smoking ~5 cigarettes per day  - Monitor respiratory status, supplemental O2 by NC PRN to maintain sats >88%  - Continue PTA Breo Ellipta  - Nicotine patch 14 mcg/day  - Ongoing education/resources for cessation     Adjustment disorder with mixed mood  Hx MDD/anxiety (per chart though patient denies)  Delirium, resolved  Not on medications PTA.  Patient denies any hx depression/anxiety but does note depressed mood in setting of recent AKA and  significant home stressors.  - Psychology and spiritual services consulted, appreciate assist  - Monitor mood     GERD  PTA on omeprazole 20 mg daily  - Continue pepcid 20 mg q48 hours (renally dosed) given allergy to pantoprazole (formulary sub for omeprazole)     Spongiotic dermatitis  Recently seen by OP dermatology, recommended betamethasone cream BID PRN, not using regularly at hospital  - Consider resumption of topical steroids if recurrent symptoms  - Started Zyrtec 4/25 as above for itching near surgical site     Sacral wound: pressure injury (stage 2 vs 3), incontinence-associated dermatitis, moisture-associated skin damage  Assessed by [facility] on 4/22 at [facility].  - WOCN consulted for ongoing follow-up at ARU  - Wound care per WOCN orders  - Pressure reduction strategies     Adjustment to disability:  Clinical psychology to eval and treat if indicated  FEN: gluten free diet, 1000 mL fluid restriction  Bowel: incontinent, recent loose stools as above  Bladder: incontinent  DVT Prophylaxis: subcutaneous heparin  GI Prophylaxis: pepcid  Code: full, confirmed on admission  Disposition: goal for home  ELOS:  target 5/13/24  Follow up Appointments on Discharge: PCP in 1-2 weeks, vascular surgery in 2 weeks, [facility], nephrology (ongoing HD), heme/onc ([facility], 7/2024)      35 minutes spent on the date of service doing chart review, history and exam, documentation, and further activities as noted above.    Patient was seen and discussed with Dr. Arun Pimentel, PM&R staff physician     Corrina Hurt PA-C  Physical Medicine & Rehabilitation

## 2024-04-30 NOTE — PLAN OF CARE
Goal Outcome Evaluation:      Plan of Care Reviewed With: patient    Overall Patient Progress: improvingOverall Patient Progress: improving    Patient is alert and oriented. Able to use a call light and make her needs known. Mix cont of BB, LBM, 4/29 voids spontaneously. Is a dialysis patient every TU, TH ,and Sat. Has CVC port to the R chest. Denies pain, SOB, or discomfort at this time. Call light within reach, Bed alarm on. Slept well through the night. Nursing staff will continue with POC

## 2024-04-30 NOTE — PLAN OF CARE
Goal Outcome Evaluation:      Plan of Care Reviewed With: patient    Overall Patient Progress: improvingOverall Patient Progress: improving     Pt remains alert and oriented x 4 and able to make her needs known, regular diet thin liquids, medications whole with water, emesis x 1 this shift, Zofran and  Lomotil administered for nausea and diarrhea, had diarrhea x 2 this shift,pt had a dialysis today, continent with bowel and bladder,commode for bowel movement and voiding. Pt requested for dilaudid x for leg and back pain.  Will continue plan of care

## 2024-04-30 NOTE — PROGRESS NOTES
Team rounds this morning. Targeting discharge home Sun 05/12/24. Per therapy, pt may be able to discharge home sooner pending coordinating of discharge and family training. Therapy planning to set up. Per therapy, discharge location pending as pt is between discharging to one of two locations. Need to confirm discharge date, discharge address, send HC referral, and coordinate OP HD. May need new labs/imaging for OP HD referral. [facility] following for OP HD. No immediate needs at this time. Primary JUANITA Payne to follow up upon return and provided with hand off. This writer available if additional needs or concerns arise.     For reference:   Hep B Core last done 04/16  Hep B antibody & antigen last done 04/03 (will need updated for OP HD referral)     [facility]    PH: [phone] & Fax: [phone]  Email: [email or web address]

## 2024-04-30 NOTE — PROGRESS NOTES
Nephrology Progress Note  04/30/2024         Assessment & Recommendations:   Shirley Hendricks is a 63 year old year old with peripheral artery disease, coronary artery disease and history of MI, hypertension, diabetes mellitus type 2 who is admitted to ARU following hospitalization for occlusion of right LE bypass graft managed with right above-the-knee amputation and complication of acute kidney injury requiring dialysis.     #Acute kidney injury: Felt to be due to contrast nephropathy as she received contrast 3 days in a row for evaluation and management of her occluded bypass graft.  There is also potentially some component of rhabdomyolysis.  -First dialysis run 4/3/2024.  -Access tunneled CVC initially placed 4/3/2024 and revised on 4/15/2024.  -Suspicion of underlying diabetic nephropathy and/or renovascular disease.  - continue dialysis Tuesday, Thursday, Saturday- had some dizziness at end of treatment today, also had slight blood loss as blood lines clotted- will use heparin tomorrow- may be on the dry side, will get weight, so that we can monitor and avoid volume depletion as this will delay renal recovery   - diuretic challenge yesterday did not effect any diuresis    - had been receiving hemodialysis at [facility] and consents to ongoing hemodialysis while at [facility].    -Dialysis tolerated on Saturday  - UOP once a day at this time.  Note pleural effusion on imaging and so we will challenge volume status as able  -continue to monitor for renal recovery. Please order creatinine for Tuesday 4/30 before HD  - continue diuretic challenge on non dialysis day.       #Hyponatremia: Sodium is low at 126-127.  This is due to fluid intake in the absence of renal function.    - 1 L daily fluid restriction- she is making some urine and hope for recovery.     #PAD/PVD  #Right common femoral artery to the mid anterior tibial artery bypass graft acute occlusion  #Right above-the-knee amputation 4/9/2024  -  "Plavix 75 mg daily (previously on Xarelto but no ongoing indication)     #Anemia due to acute blood loss with retroperitoneal hematoma and history of GI bleeding in December 2023.  - hgb stable at 8.8 - recheck tomorrow given some blood loss today  - Has required transfusion with PRBCs.  -Has been receiving EPO 10K and Venofer with hemodialysis   Monitor hemoglobin weekly at least  - retacrit 10K units three times weekly with HD     #Coronary artery disease with history of myocardial infarction x 3  #History of Takotsubo cardiomyopathy  PTA carvedilol and lisinopril, lisinopril has been on hold.  PTA rosuvastatin was initially held due to rhabdo but resumed.  PTA empagliflozin 10 mg daily has been discontinued given acute kidney injury     #Diabetes mellitus type 2  - Not requiring medications at this time     #Stage Mark marginal zone B-cell lymphoma  - Undergoing surveillance with [place] [facility]     Recommendations were communicated to primary team via note    Soo Gabriele Mejia MD   Division of Renal Disease and Hypertension  [facility]  [employer] Web Console    Interval History :   Nursing and provider notes from last 24 hours reviewed.  In the last 24 hours Shirley is doing OK, did have clotted lines during HD , most of blood returned, some blood loss.    -     Review of Systems:   I reviewed the following systems:  GI: stable appetite. no nausea or vomiting, + diarrhea.   Neuro:  no confusion  Constitutional:  no fever or chills  CV: no dyspnea or edema.  no chest pain.    Physical Exam:   I/O last 3 completed shifts:  In: 60 [P.O.:60]  Out: 1800 [Other:1800]   BP (!) 173/87   Pulse 65   Temp 98  F (36.7  C) (Oral)   Resp 20   Ht 1.549 m (5' 0.98\")   Wt 51.8 kg (114 lb 3.2 oz)   LMP  (LMP Unknown)   SpO2 97%   BMI 21.59 kg/m       GENERAL APPEARANCE: no distress  EYES:  no scleral icterus, pupils equal  Mouth: dry mucosa  PULM: normal work of breathing  CV: 1+ left foot edema  INTEGUMENT: no cyanosis, no " rash  NEURO:  speech fluent, face symmetric    Labs:   All labs reviewed by me  Electrolytes/Renal -   Recent Labs   Lab Test 04/30/24  0653 04/29/24  0615 04/28/24  0039 04/27/24  0722 04/26/24  0210 04/25/24  0540   * 126*  --  127*  --  126*   POTASSIUM 4.4 4.4  --  4.5  --  4.3   CHLORIDE 91* 92*  --  93*  --  95*   CO2 23 24  --  25  --  24   BUN 30.5* 21.0  --  29.0*  --  36.9*   CR 4.16* 3.20*  --  2.67*  --  2.76*   GLC 79 79  --  80   < > 86   SONIA 7.6* 7.6*  --  7.7*  --  7.7*   MAG 1.9 2.0 2.1 1.4*  --  2.3   PHOS 3.8  --   --  2.7  --  2.8    < > = values in this interval not displayed.       CBC -   Recent Labs   Lab Test 04/30/24  0653 04/27/24  0722 04/25/24  0540   WBC 3.3* 4.3 4.4   HGB 8.8* 8.7* 9.0*    272 282       LFTs -   Recent Labs   Lab Test 04/30/24  0653 04/23/24  0602 04/17/24  0636 04/15/24  0536 04/09/24  0636   ALKPHOS 100 96  --   --  155*   BILITOTAL 0.4 0.5  --   --  0.9   ALT 24 20  --   --  53*   AST 21 16  --   --  68*   PROTTOTAL 4.3* 4.1*  --   --  3.9*   ALBUMIN 2.0*  2.0* 1.9* 2.3*   < > 1.8*    < > = values in this interval not displayed.       Iron Panel -   Recent Labs   Lab Test 04/10/24  0559 04/09/24  1817 01/08/24  1542 12/11/23  0550   IRON 22*  --  71 122   IRONSAT 18  --  17 32   BRYN  --  609* 25 23       Current Medications:  Current Facility-Administered Medications   Medication Dose Route Frequency Provider Last Rate Last Admin    amLODIPine (NORVASC) tablet 5 mg  5 mg Oral Daily Corrina Hurt PA-C   5 mg at 04/30/24 0817    carvedilol (COREG) tablet 6.25 mg  6.25 mg Oral BID w/meals Corrina Hurt PA-C   6.25 mg at 04/30/24 0816    cetirizine (zyrTEC) tablet 5 mg  5 mg Oral Daily Corrina Hurt PA-C   5 mg at 04/30/24 0817    clopidogrel (PLAVIX) tablet 75 mg  75 mg Oral Daily Corrina Hurt PA-C   75 mg at 04/30/24 0817    famotidine (PEPCID) tablet 20 mg  20 mg Oral Q48H Corrina Hurt PA-C   20 mg at 04/30/24 1538     fluticasone-vilanterol (BREO ELLIPTA) 200-25 MCG/ACT inhaler 1 puff  1 puff Inhalation Daily Corrina Hurt PA-C   1 puff at 04/30/24 0948    gabapentin (NEURONTIN) capsule 200 mg  200 mg Oral Once per day on Tuesday Thursday Saturday Corrina Hurt PA-C   200 mg at 04/27/24 2027    heparin ANTICOAGULANT injection 5,000 Units  5,000 Units Subcutaneous Q12H Corrina Hurt PA-C   5,000 Units at 04/30/24 0817    lactobacillus rhamnosus (GG) (CULTURELL) capsule 1 capsule  1 capsule Oral BID Corrina Hurt PA-C   1 capsule at 04/30/24 0817    miconazole (MICATIN) 2 % powder   Topical BID Corrina Hurt PA-C   Given at 04/30/24 0846    multivitamin RENAL (TRIPHROCAPS) capsule 1 capsule  1 capsule Oral Daily Corrina Hurt PA-C   1 capsule at 04/30/24 0817    nicotine (NICODERM CQ) 14 MG/24HR 24 hr patch 1 patch  1 patch Transdermal Daily Corrina Hurt PA-C   1 patch at 04/30/24 0821    rosuvastatin (CRESTOR) tablet 10 mg  10 mg Oral At Bedtime Corrina Hurt PA-C   10 mg at 04/29/24 2027    scopolamine (TRANSDERM) 72 hr patch 1 patch  1 patch Transdermal Q72H Corrina Hurt PA-C   1 patch at 04/29/24 0814    And    scopolamine (TRANSDERM-SCOP) Patch in Place   Transdermal Q8H Corrina Hurt PA-C        silver sulfADIAZINE (SILVADENE) 1 % cream   Topical Daily Gala Lo MD   Given at 04/30/24 0821     Current Facility-Administered Medications   Medication Dose Route Frequency Provider Last Rate Last Admin     Soo Gabriele Mejia MD

## 2024-04-30 NOTE — PROGRESS NOTES
CLINICAL NUTRITION SERVICES - REASSESSMENT NOTE     Nutrition Prescription    RECOMMENDATIONS FOR MDs/PROVIDERS TO ORDER:  Appreciate encouragement surrounding PO intake and adherence to Gluten Free diet    Malnutrition Status:    Severe malnutrition in the context of acute illness.     Recommendations already ordered by Registered Dietitian (RD):  - Discontinued Ensure Shake  - Snacks/supplements PRN    Future/Additional Recommendations:  - Monitor PO intake, labs, and weight trends  - Continue to encourage adherence to Gluten Free diet and provided education as needed     EVALUATION OF THE PROGRESS TOWARD GOALS   Diet: Gluten Free and 1000 mL Fluid Restriction  - Room service with assist    Snacks/supplements: Ensure Shake (chocolate) at 2 pm    Intake: 50-75% per flowsheets    Per [employer], pt receiving 3 meals/day from room service. Ordered 3-day average of 2055 kcal and 67 g protein.      NEW FINDINGS   - Per chart review, pt has continued to have frequent loose stools. Over the weekend, she was agreeable to trial Gluten Free diet  - Discussed in team rounds this morning. Pt had emesis this morning and continues to have loose stools. Pt has PRN Zofran and Lomotil.  - Per RN from weekend, pt was not wanting to drink Ensure Shake. Will discontinue at this time and follow up on supplements.  - Unable to see pt today due to therapy and dialysis schedule. Chart review only    Weight:   04/28/24 0100 51.8 kg (114 lb 3.2 oz) Bed scale   04/27/24 0400 51.8 kg (114 lb 3.2 oz) Bed scale   04/26/24 2340 51.1 kg (112 lb 10.5 oz) Bed scale   04/25/24 0918 56.1 kg (123 lb 10.9 oz) Bed scale   04/23/24 0808 55.8 kg (123 lb 0.3 oz) Bed scale   04/22/24 1350 59.1 kg (130 lb 4.7 oz) Bed scale     Wt Readings from Last 10 Encounters:   04/28/24 51.8 kg (114 lb 3.2 oz)   04/22/24 50.5 kg (111 lb 5.3 oz)   01/08/24 49.8 kg (109 lb 12.8 oz)   12/12/23 50.8 kg (112 lb)   11/21/23 50.6 kg (111 lb 8 oz)   11/13/23 51.6 kg (113 lb  11.2 oz)   10/11/23 52.5 kg (115 lb 11.2 oz)   08/02/23 52.3 kg (115 lb 6.4 oz)   05/26/23 52.1 kg (114 lb 12.8 oz)   12/28/22 51.1 kg (112 lb 11.2 oz)   Weight down this admission - likely fluid related with HD  Current weight in line with weights over past year    Labs:   Na: 124 (L)  BUN: 30.5 (H)  Cr: 4.16 (H)    Meds:  Pepcid, every 48 hrs  Culturell, BID  Renal MVI  Scopolamine patch  Lomotil, PRN - given 4/29  Zofran, PRN - given 4/27    GI: last BM 4/29 x2; loose, per I/Os    Renal: LIMA requiring HD    Skin: Cesar 14  WOCN following for coccyx  Wound type: Pressure Injury, Incontinence Associated Dermatitis (IAD), and Moisture Associated Skin Damage (MASD)     Pressure Injury Stage: either Stage 2 or 3 deferring definitive staging until wound base has evolved, hospital acquired from recent admission at [facility].     MALNUTRITION  % Intake: < 75% for > 7 days (moderate)  % Weight Loss: Unable to assess  Subcutaneous Fat Loss: Facial region: moderate and Upper arm: moderate - per RD note 4/24  Muscle Loss: Temporal: moderate, Facial & jaw region: moderate, Thoracic region (clavicle, acromium bone, deltoid, trapezius, pectoral): moderate, Upper arm (bicep, tricep): moderate, and Lower arm  (forearm): moderate - per RD note 4/24  Fluid Accumulation/Edema: Mild  Malnutrition Diagnosis: Severe malnutrition in the context of acute illness.     Previous Goals   Patient to consume % of nutritionally adequate meal trays TID, or the equivalent with supplements/snacks.   Evaluation: Not met    Previous Nutrition Diagnosis  Inadequate oral intake related to abdominal pain, loose stools, nausea, and limited diet as evidenced by chart review.    Evaluation: Modified    CURRENT NUTRITION DIAGNOSIS  Inadequate oral intake related to nausea, loose stools, dislike of GF diet as evidenced by chart review and discussion in rounds.    INTERVENTIONS  Implementation  Collaboration with other providers - discussed in team  rounds  Medical food supplement therapy - discontinue Ensure Shake, snacks/supplements PRN    Goals  Patient to consume % of nutritionally adequate meal trays TID, or the equivalent with supplements/snacks.     Monitoring/Evaluation  Progress toward goals will be monitored and evaluated per protocol.     Marleen Mccloud RD, LD  Available via phone and Vocera  Phone: [phone]  Vocera: 5R Acute Rehab Clinical Dietitian  Weekend/Holiday Vocera: Weekend Holiday Clinical Dietitian [Multi Site Groups]

## 2024-04-30 NOTE — PROGRESS NOTES
Acute Rehab Care Conference/Team Rounds      Type: Team Rounds    Present: Dr. Arun Pimentel, Corrina Hurt PA, Dr. Jacqueline Salinas Neuropsychologist, Hellen Yang PT, Neli Morales OT, Danielle Adair [facility], Marleen Mccloud RD, Kimberly Mata Therapy Supervisor, Markus Dowell RN, and Shirley Hendricks Patient.     Discharge Barriers/Treatment/Education    Rehab Diagnosis: S/p right AKA    Active Medical Co-morbidities/Prognosis:   Patient Active Problem List   Diagnosis    Reflux esophagitis    Health Care Home    Osteoporosis    Cervicalgia    Hyperlipidemia LDL goal <70    PAD (peripheral artery disease) (H24)    Essential hypertension    Coronary artery disease involving native coronary artery of native heart without angina pectoris    Primary osteoarthritis involving multiple joints    DDD (degenerative disc disease), lumbar    S/P carotid endarterectomy    Chronic allergic rhinitis due to animal hair and dander    Tobacco use disorder    Chronic obstructive pulmonary disease, unspecified COPD type (H)    Anxiety    Vitamin C deficiency    History of colonic polyps    SVT (supraventricular tachycardia) (H24)    Bilateral carpal tunnel syndrome    Charcot-Breonna-Tooth disease type 1A    Atherosclerosis of bypass graft of right lower extremity with other clinical manifestation (H24)    Pseudoaneurysm of femoral artery following procedure (H24)    NSTEMI (non-ST elevated myocardial infarction) (H)    Acute reaction to situational stress    Acute on chronic anemia    Lymphoma of lymph nodes of neck, unspecified lymphoma type (H)    Arterial occlusion    S/P AKA (above knee amputation) unilateral, right (H)         Safety: Alarms on    Pain: Denies pain     Medications, Skin, Tubes/Lines: Takes pills whole, skin is good except insicion area, Has L PIV, CVC to the right chest area.     Swallowing/Nutrition:    Bowel/Bladder: Mix cont of BB, LBM 4/29    Psychosocial: Lives in a rental house. Lives with others who are  supportive. Strained relationship with  who is currently living with his family in [place]. Indep PTA. Depression and anxiety. Tobacco abuse reported. Retired. No financial concerns reported.     ADLs/IADLs: Pt is IND with UB cares and able to don pants in bed with set up, assist for shoes. Biggest barrier is toileting. Pt is progressing with slide board transfers however she is unable to consistently manage her clothing from a commode and is frequently incontinent of liquid BM during transitions. Pt will have assist at discharge and will talk to her family about level of assist they are willing to provide for toileting since this will be a barrier. When continent she can do her own lydia cares. Recommend a padded drop arm commode since she will be doing slideboard transfers and in her current home a WC will notbe able to fit into the bathroom. Recommend HH OT to make sure transition home is smooth and to improve her indpendence.     Mobility: Pt currently SBA w/ slideboard transfers even surfaces, needing Siddharth for setup at times. Primary barrier to transfer training is pt is incontinent loose bowel with most transfers and exertion. Pt ind w/ w/c propulsion. Pt will have family support at discharge but will need to confirm level of assist they are willing to provide w/ toileting. Family training to be completed especially for bumping w/c up/down entry steps until pt moves into new home w/ ramp. HHPT.     Cognition/Language:    Community Re-Entry: Recommending ongoing therapies to promote improved safety and ind w/ mobility.     Transportation: Car transfer not a barrier. Family will provide.     Decision maker: self    Plan of Care and goals reviewed and updated.    Discharge Plan/Recommendations    Fall Precautions: continue    Estimated length of stay: Targeting discharge date 5/13/2024    Overall plan for the patient: Continue with high intensity therapy addressing deficits in ADLs secondary to recent AKA on the  right aiming to function from a wheelchair basis independently.  Continue medical management addressing chronic kidney injury, electrolyte imbalances and GI/bowel issues.      Utilization Review and Continued Stay Justification    Medical Necessity Criteria:    For any criteria that is not met, please document reason and plan for discharge, transfer, or modification of plan of care to address.    Requires intensive rehabilitation program to treat functional deficits?: Yes    Requires 3x per week or greater involvement of rehabilitation physician to oversee rehabilitation program?: Yes    Requires rehabilitation nursing interventions?: Yes    Patient is making functional progress?: Yes    There is a potential for additional functional progress? Yes    Patient is participating in therapy 3 hours per day a minimum of 5 days per week or 15 hours per week in 7 day period?: Week 1 limitation on day 2 due to unexpected dialysis, such that pt participated with 13.5+ hrs of therapy in week 1.  Participation week 2 to include 16.5+ hours    Has discharge needs that require coordinated discharge planning approach?:Yes      Final Physician Sign off    Statement of Approval: I have reviewed this document and aprove its content.    Patient Goals    Social Work Goals: Confirm discharge recommendations with therapy, coordinate safe discharge plan and remain available to support and assist as needed.    Therapy Frequency (OT): 6 times/week  OT: Hygiene/Grooming: modified independent  OT: Upper Body Dressing: Modified independent  OT: Lower Body Dressing: Modified independent  OT: Upper Body Bathing: Supervision/stand-by assist  OT: Lower Body Bathing: Supervision/stand-by assist  OT: Transfer: Supervision/stand-by assist (tub transfer)  OT: Toilet Transfer/Toileting: Modified independent, toilet transfer, cleaning and garment management, within precautions  OT: Meal Preparation: Modified independent, with simple meal preparation,  from wheelchair       PT Predicted Duration/Target Date for Goal Attainment: 05/13/24  PT Frequency: 6x/week  PT: Bed Mobility: Independent  PT: Transfers: Modified independent (slideboard)     PT: Stairs: 3 stairs, Minimal assist  PT: Wheelchair Mobility: 150 feet, manual wheelchair     PT: Goal 1: Car transfer w/ Siddharth using slideboard  PT: Goal 2: Pt will demonstrate ind w/ AKA HEP focusing on LE strength and ROM.       Patient Goal:  Pain Management: Patient will verbalize pain for managment and cooperate for both pharmacological and non-pharmacological pain managment.  Goal: Wound Management: Patient will cooperate with staff for wound care to be free of wound infection during her stay in ARU.        Goal: Skin Integrity: Patient will position independently in bed/chair to prevent pressure injury and skin break down during her stay in ARU.    Goal: Safety Management: Patient will use call light and wait for staff assistance for all transfer to ensure safety and prevent falls.

## 2024-04-30 NOTE — PLAN OF CARE
Post Rounds phone call:   PT present. Left  for dtr Malu and received callback from sister Celestina. Provided update regarding discharge timeline, current functional status, expected assist and DME needs at discharge. Celestina expresses concern about level of assist pt will need with toileting. Confirmed that pt's son and brother will be at home (but not 24/7) and that pt will need to reach out to family members to coordinate schedule and confirm they are are willing to help with toileting and if pt would be incontinent. Celestina also expressing concerns about situation w/ pt's spouse and if pt will want to take care of her spouse once she returns home. Deferred to SW for possible resources as pt is her own decision maker. Celestina states they have spoken w/ SW when pt first admitted to hospital and did not find this helpful. Sister does not seem to want follow up from ERIKA Cordero at this time. Updated that closer to discharge family will need to come in for family training. Confirmed that pt will discharge to rental house and then transition to new home at the end of May. Will call and update next week.

## 2024-05-01 ENCOUNTER — APPOINTMENT (OUTPATIENT)
Dept: PHYSICAL THERAPY | Facility: CLINIC | Age: 64
DRG: 559 | End: 2024-05-01
Attending: PHYSICAL MEDICINE & REHABILITATION
Payer: COMMERCIAL

## 2024-05-01 ENCOUNTER — APPOINTMENT (OUTPATIENT)
Dept: OCCUPATIONAL THERAPY | Facility: CLINIC | Age: 64
DRG: 559 | End: 2024-05-01
Attending: PHYSICAL MEDICINE & REHABILITATION
Payer: COMMERCIAL

## 2024-05-01 LAB
ERYTHROCYTE [DISTWIDTH] IN BLOOD BY AUTOMATED COUNT: 19 % (ref 10–15)
HCT VFR BLD AUTO: 29.5 % (ref 35–47)
HGB BLD-MCNC: 9.3 G/DL (ref 11.7–15.7)
MCH RBC QN AUTO: 30.5 PG (ref 26.5–33)
MCHC RBC AUTO-ENTMCNC: 31.5 G/DL (ref 31.5–36.5)
MCV RBC AUTO: 97 FL (ref 78–100)
PLATELET # BLD AUTO: 222 10E3/UL (ref 150–450)
RBC # BLD AUTO: 3.05 10E6/UL (ref 3.8–5.2)
WBC # BLD AUTO: 3.9 10E3/UL (ref 4–11)

## 2024-05-01 PROCEDURE — 250N000013 HC RX MED GY IP 250 OP 250 PS 637: Performed by: PHYSICIAN ASSISTANT

## 2024-05-01 PROCEDURE — 97530 THERAPEUTIC ACTIVITIES: CPT | Mod: GP

## 2024-05-01 PROCEDURE — 90832 PSYTX W PT 30 MINUTES: CPT | Performed by: CLINICAL NEUROPSYCHOLOGIST

## 2024-05-01 PROCEDURE — 85041 AUTOMATED RBC COUNT: CPT | Performed by: PHYSICIAN ASSISTANT

## 2024-05-01 PROCEDURE — 36415 COLL VENOUS BLD VENIPUNCTURE: CPT | Performed by: PHYSICIAN ASSISTANT

## 2024-05-01 PROCEDURE — 128N000003 HC R&B REHAB

## 2024-05-01 PROCEDURE — 250N000011 HC RX IP 250 OP 636: Performed by: PHYSICIAN ASSISTANT

## 2024-05-01 PROCEDURE — 97110 THERAPEUTIC EXERCISES: CPT | Mod: GO

## 2024-05-01 PROCEDURE — 97110 THERAPEUTIC EXERCISES: CPT | Mod: GP

## 2024-05-01 PROCEDURE — 97535 SELF CARE MNGMENT TRAINING: CPT | Mod: GO

## 2024-05-01 RX ADMIN — ACETAMINOPHEN 1000 MG: 500 TABLET ORAL at 14:41

## 2024-05-01 RX ADMIN — Medication 1 CAPSULE: at 21:21

## 2024-05-01 RX ADMIN — CETIRIZINE HYDROCHLORIDE 5 MG: 5 TABLET ORAL at 07:58

## 2024-05-01 RX ADMIN — FLUTICASONE FUROATE AND VILANTEROL TRIFENATATE 1 PUFF: 200; 25 POWDER RESPIRATORY (INHALATION) at 08:01

## 2024-05-01 RX ADMIN — CARVEDILOL 6.25 MG: 6.25 TABLET, FILM COATED ORAL at 18:37

## 2024-05-01 RX ADMIN — HYDROMORPHONE HYDROCHLORIDE 2 MG: 2 TABLET ORAL at 10:50

## 2024-05-01 RX ADMIN — SILVER SULFADIAZINE: 10 CREAM TOPICAL at 09:40

## 2024-05-01 RX ADMIN — AMLODIPINE BESYLATE 5 MG: 5 TABLET ORAL at 07:59

## 2024-05-01 RX ADMIN — NICOTINE 1 PATCH: 14 PATCH, EXTENDED RELEASE TRANSDERMAL at 07:58

## 2024-05-01 RX ADMIN — HYDROMORPHONE HYDROCHLORIDE 2 MG: 2 TABLET ORAL at 18:40

## 2024-05-01 RX ADMIN — HEPARIN SODIUM 5000 UNITS: 5000 INJECTION, SOLUTION INTRAVENOUS; SUBCUTANEOUS at 08:02

## 2024-05-01 RX ADMIN — CARVEDILOL 6.25 MG: 6.25 TABLET, FILM COATED ORAL at 07:59

## 2024-05-01 RX ADMIN — ROSUVASTATIN CALCIUM 10 MG: 5 TABLET, COATED ORAL at 21:21

## 2024-05-01 RX ADMIN — MICONAZOLE NITRATE: 20 POWDER TOPICAL at 08:05

## 2024-05-01 RX ADMIN — CLOPIDOGREL BISULFATE 75 MG: 75 TABLET ORAL at 07:59

## 2024-05-01 RX ADMIN — Medication 1 CAPSULE: at 08:02

## 2024-05-01 RX ADMIN — HEPARIN SODIUM 5000 UNITS: 5000 INJECTION, SOLUTION INTRAVENOUS; SUBCUTANEOUS at 21:20

## 2024-05-01 ASSESSMENT — ACTIVITIES OF DAILY LIVING (ADL)
ADLS_ACUITY_SCORE: 39
ADLS_ACUITY_SCORE: 38
ADLS_ACUITY_SCORE: 38
ADLS_ACUITY_SCORE: 39
ADLS_ACUITY_SCORE: 38
ADLS_ACUITY_SCORE: 38
ADLS_ACUITY_SCORE: 39
ADLS_ACUITY_SCORE: 36
ADLS_ACUITY_SCORE: 38
ADLS_ACUITY_SCORE: 39
ADLS_ACUITY_SCORE: 38
ADLS_ACUITY_SCORE: 39
ADLS_ACUITY_SCORE: 38

## 2024-05-01 NOTE — PROGRESS NOTES
See PT note from yesterday 04/30/24.     Spoke with pt sister Celestina for 35 minutes. Discussed concerns from staff overnight, PCS notified. Discussed concerns about LOC and A available at home, as well as general discharge plan. Sister asked about TCU. Discussed time left on ARU to make progress, TCU level of care, barriers with OP HD placement, and additional resources. Sister stated that she plans to come visit with pt this evening. Per discussion, SW printed a [employer] mobility application and will drop off in pt room by end of the day. SW also discussed hotel resources in-between staying in the rental home vs home that is being finish at the end of May and offered to email those resources. Sister appreciative of the call and information. Pt sister expressed relief and provided her email address: [email or web address]. Discussed gluten diet generally and SW offered to have RD connect with Celestina to provide additional resources and education as well. Validated and provided empathic listening. Celestina receptive and denied additional needs at this time.     Primary PT notified of the discussion. In agreement to bring family in for family training early next week to discuss further and better determine discharge home with family A and HC vs TCU. SW will continue to follow.     ADDENDUM: Spoke with pt sister a little more on the phone. Sister Celestina and Miriam planning to attend team rounds meeting Tues and in agreement with plans next step.     Danielle Adair [facility] Acute Inpatient Rehab    PH: [phone] & Fax: [phone]  Email: [email or web address]

## 2024-05-01 NOTE — PLAN OF CARE
Discharge Planner Post-Acute Rehab OT:     Discharge Plan: home with assist and HH OT     Precautions: fall, NWB of RLE, dialysis, sacral wound, elevate residual limb when in bed    Current Status:  ADLs:  Mobility: CGA for slide board to WC  Grooming: IND seated at sink in w/c  Dressing: UB: set up, LB: set up in supine, assist for brace and shoe  Bathing: TBD  Toileting: SB to commode Ax1  IADLs: Pt does most IADL including caregiving for .   Vision/Cognition: intact    Assessment: Sessions focus on IND completion of ADL routine, completing LB dressing from supine, SB to w/c and completion of g/h from seated sinkside. Pt demo'ing safety with SB placement, positioning, and transfers, completing to various surfaces w/ CGA-SBA. Troubleshoot alternative and more IND means of toileting using bedpan as pt reporting brother not comfortable with assisting with toileting/pericares. Pt still uncertain of d/c location, unable to simulate home env but discussing essential pieces for IND bedpan, pericares, and clothing mgmt.    Other Barriers to Discharge (DME, Family Training, etc): DME, family training, pt has support from multiple family members and will need to schedule family training since she will most likely still require assist with toileting.

## 2024-05-01 NOTE — PLAN OF CARE
Discharge Plan: home with HH PT      Precautions: fall, NWB of RLE, sacral wound, elevate RLE in bed    Current Status:  Bed Mobility: [place]   Transfer: Slideboard SBA bed<>w/c   Gait: unable, unsafe  Stairs: not tested  Balance: Stable dynamic sitting.     Assessment: Pt indicating that she is now unsure if her brother/son will be willing to help with toileting. Offered to have family training early next week so family able to have a better understanding of the level of assist pt will need. Will follow with pt about this later in week as she is stating she is too overwhelmed to setup at this time. Per pt and SW, now also unclear where pt will be discharging to (rental home vs extended stay hotel vs alternate location).     Other Barriers to Discharge (DME, Family Training, etc):   DME: K3 w/c (order faxed 4/30), slideboard, bedrail.     Family training: TBD. Car tx, bumping up 3STE in w/c.        What Type Of Note Output Would You Prefer (Optional)?: Bullet Format How Severe Is Your Acne?: mild Is This A New Presentation, Or A Follow-Up?: Follow Up Acne

## 2024-05-01 NOTE — PROGRESS NOTES
CLINICAL NUTRITION SERVICES - BRIEF NOTE      Nutrition Prescription     Recommendations already ordered by Registered Dietitian (RD):  - Trial of Ensure vanilla  - Additional snacks/supplements PRN  - Nutrition education: gluten free diet     Future/Additional Recommendations:  Monitor PO intake, supplement use, labs, and weight trends    *Please see full reassessment note from 4/30/24    New Findings:  Met with pt in room. Pt had GF pasta and bread for lunch and thought it was pretty good. She was much more accepting of RD visit and discussion of gluten free diet today. We discussed gluten free diet and label reading. Provided handouts: Celiac Disease Nutrition Therapy, Gluten Free Diet and Food Label Reading Guide. Discussed resources for gluten free diet (Celiac [employer]). Recommended Gluten Free grocery store tour at [facility] with dietitian.     Also sent email with information to pt's sister Yue.    RD to follow per protocol.    Marleen Mccloud RD, LD  Available via phone and Vocera  Phone: [phone]  Vocera: 5R Acute Rehab Clinical Dietitian  Weekend/Holiday Vocera: Weekend Holiday Clinical Dietitian [Multi Site Groups]

## 2024-05-01 NOTE — PLAN OF CARE
Patient alert and oriented. Slept through this shift. Denied pain or discomfort. No SOB or chest pain voiced. Adequate on HD. Had loose stool earlier in the shift. Left PIV patent and flushes well. HD right chest. Mepilex to sacral wound. No care concerns voiced at this time. Call light within reach. Continue with plan of care.

## 2024-05-02 ENCOUNTER — APPOINTMENT (OUTPATIENT)
Dept: OCCUPATIONAL THERAPY | Facility: CLINIC | Age: 64
DRG: 559 | End: 2024-05-02
Attending: PHYSICAL MEDICINE & REHABILITATION
Payer: COMMERCIAL

## 2024-05-02 ENCOUNTER — APPOINTMENT (OUTPATIENT)
Dept: CT IMAGING | Facility: CLINIC | Age: 64
End: 2024-05-02
Attending: PHYSICIAN ASSISTANT
Payer: COMMERCIAL

## 2024-05-02 LAB
ANION GAP SERPL CALCULATED.3IONS-SCNC: 9 MMOL/L (ref 7–15)
BUN SERPL-MCNC: 25.3 MG/DL (ref 8–23)
CALCIUM SERPL-MCNC: 7.3 MG/DL (ref 8.8–10.2)
CHLORIDE SERPL-SCNC: 90 MMOL/L (ref 98–107)
CREAT SERPL-MCNC: 3.74 MG/DL (ref 0.51–0.95)
DEPRECATED HCO3 PLAS-SCNC: 23 MMOL/L (ref 22–29)
EGFRCR SERPLBLD CKD-EPI 2021: 13 ML/MIN/1.73M2
ERYTHROCYTE [DISTWIDTH] IN BLOOD BY AUTOMATED COUNT: 18.4 % (ref 10–15)
GLUCOSE SERPL-MCNC: 81 MG/DL (ref 70–99)
HCT VFR BLD AUTO: 26.9 % (ref 35–47)
HGB BLD-MCNC: 8.7 G/DL (ref 11.7–15.7)
MAGNESIUM SERPL-MCNC: 1.5 MG/DL (ref 1.7–2.3)
MCH RBC QN AUTO: 31.1 PG (ref 26.5–33)
MCHC RBC AUTO-ENTMCNC: 32.3 G/DL (ref 31.5–36.5)
MCV RBC AUTO: 96 FL (ref 78–100)
PHOSPHATE SERPL-MCNC: 3.2 MG/DL (ref 2.5–4.5)
PLATELET # BLD AUTO: 223 10E3/UL (ref 150–450)
POTASSIUM SERPL-SCNC: 4.5 MMOL/L (ref 3.4–5.3)
RBC # BLD AUTO: 2.8 10E6/UL (ref 3.8–5.2)
SODIUM SERPL-SCNC: 122 MMOL/L (ref 135–145)
WBC # BLD AUTO: 3.6 10E3/UL (ref 4–11)

## 2024-05-02 PROCEDURE — 97535 SELF CARE MNGMENT TRAINING: CPT | Mod: GO | Performed by: STUDENT IN AN ORGANIZED HEALTH CARE EDUCATION/TRAINING PROGRAM

## 2024-05-02 PROCEDURE — 74176 CT ABD & PELVIS W/O CONTRAST: CPT | Mod: 26 | Performed by: RADIOLOGY

## 2024-05-02 PROCEDURE — 84100 ASSAY OF PHOSPHORUS: CPT | Performed by: PHYSICIAN ASSISTANT

## 2024-05-02 PROCEDURE — 90935 HEMODIALYSIS ONE EVALUATION: CPT

## 2024-05-02 PROCEDURE — 85027 COMPLETE CBC AUTOMATED: CPT | Performed by: PHYSICIAN ASSISTANT

## 2024-05-02 PROCEDURE — 250N000009 HC RX 250: Performed by: PHYSICIAN ASSISTANT

## 2024-05-02 PROCEDURE — 74176 CT ABD & PELVIS W/O CONTRAST: CPT

## 2024-05-02 PROCEDURE — 250N000011 HC RX IP 250 OP 636: Performed by: INTERNAL MEDICINE

## 2024-05-02 PROCEDURE — 99231 SBSQ HOSP IP/OBS SF/LOW 25: CPT | Performed by: PHYSICIAN ASSISTANT

## 2024-05-02 PROCEDURE — 36415 COLL VENOUS BLD VENIPUNCTURE: CPT | Performed by: PHYSICIAN ASSISTANT

## 2024-05-02 PROCEDURE — 99233 SBSQ HOSP IP/OBS HIGH 50: CPT | Mod: 24 | Performed by: INTERNAL MEDICINE

## 2024-05-02 PROCEDURE — 80048 BASIC METABOLIC PNL TOTAL CA: CPT | Performed by: PHYSICIAN ASSISTANT

## 2024-05-02 PROCEDURE — 83735 ASSAY OF MAGNESIUM: CPT | Performed by: PHYSICIAN ASSISTANT

## 2024-05-02 PROCEDURE — 128N000003 HC R&B REHAB

## 2024-05-02 PROCEDURE — 250N000013 HC RX MED GY IP 250 OP 250 PS 637: Performed by: PHYSICIAN ASSISTANT

## 2024-05-02 PROCEDURE — 250N000011 HC RX IP 250 OP 636: Performed by: PHYSICIAN ASSISTANT

## 2024-05-02 PROCEDURE — 634N000001 HC RX 634: Mod: JZ | Performed by: INTERNAL MEDICINE

## 2024-05-02 PROCEDURE — 258N000003 HC RX IP 258 OP 636: Performed by: INTERNAL MEDICINE

## 2024-05-02 RX ORDER — HEPARIN SODIUM 1000 [USP'U]/ML
500 INJECTION, SOLUTION INTRAVENOUS; SUBCUTANEOUS CONTINUOUS
Status: DISCONTINUED | OUTPATIENT
Start: 2024-05-02 | End: 2024-05-02

## 2024-05-02 RX ADMIN — HEPARIN SODIUM 500 UNITS/HR: 1000 INJECTION INTRAVENOUS; SUBCUTANEOUS at 11:37

## 2024-05-02 RX ADMIN — ROSUVASTATIN CALCIUM 10 MG: 5 TABLET, COATED ORAL at 20:29

## 2024-05-02 RX ADMIN — CARVEDILOL 6.25 MG: 6.25 TABLET, FILM COATED ORAL at 07:55

## 2024-05-02 RX ADMIN — CLOPIDOGREL BISULFATE 75 MG: 75 TABLET ORAL at 07:55

## 2024-05-02 RX ADMIN — CETIRIZINE HYDROCHLORIDE 5 MG: 5 TABLET ORAL at 07:55

## 2024-05-02 RX ADMIN — MICONAZOLE NITRATE: 20 POWDER TOPICAL at 20:29

## 2024-05-02 RX ADMIN — Medication 1 CAPSULE: at 07:55

## 2024-05-02 RX ADMIN — GABAPENTIN 200 MG: 100 CAPSULE ORAL at 20:29

## 2024-05-02 RX ADMIN — EPOETIN ALFA-EPBX 4000 UNITS: 10000 INJECTION, SOLUTION INTRAVENOUS; SUBCUTANEOUS at 11:42

## 2024-05-02 RX ADMIN — SODIUM CHLORIDE 300 ML: 9 INJECTION, SOLUTION INTRAVENOUS at 11:38

## 2024-05-02 RX ADMIN — FAMOTIDINE 20 MG: 20 TABLET ORAL at 16:06

## 2024-05-02 RX ADMIN — SCOPALAMINE 1 PATCH: 1 PATCH, EXTENDED RELEASE TRANSDERMAL at 08:49

## 2024-05-02 RX ADMIN — HEPARIN SODIUM 5000 UNITS: 5000 INJECTION, SOLUTION INTRAVENOUS; SUBCUTANEOUS at 07:55

## 2024-05-02 RX ADMIN — AMLODIPINE BESYLATE 5 MG: 5 TABLET ORAL at 07:55

## 2024-05-02 RX ADMIN — CARVEDILOL 6.25 MG: 6.25 TABLET, FILM COATED ORAL at 17:26

## 2024-05-02 RX ADMIN — NICOTINE 1 PATCH: 14 PATCH, EXTENDED RELEASE TRANSDERMAL at 07:53

## 2024-05-02 RX ADMIN — Medication 1 CAPSULE: at 20:29

## 2024-05-02 RX ADMIN — HEPARIN SODIUM 5000 UNITS: 5000 INJECTION, SOLUTION INTRAVENOUS; SUBCUTANEOUS at 20:29

## 2024-05-02 RX ADMIN — SODIUM CHLORIDE 250 ML: 9 INJECTION, SOLUTION INTRAVENOUS at 11:37

## 2024-05-02 RX ADMIN — ONDANSETRON 4 MG: 4 TABLET, ORALLY DISINTEGRATING ORAL at 08:49

## 2024-05-02 RX ADMIN — HEPARIN SODIUM 500 UNITS: 1000 INJECTION INTRAVENOUS; SUBCUTANEOUS at 11:37

## 2024-05-02 RX ADMIN — HYDROMORPHONE HYDROCHLORIDE 2 MG: 2 TABLET ORAL at 17:26

## 2024-05-02 ASSESSMENT — ACTIVITIES OF DAILY LIVING (ADL)
ADLS_ACUITY_SCORE: 38
ADLS_ACUITY_SCORE: 40
ADLS_ACUITY_SCORE: 38
ADLS_ACUITY_SCORE: 38
ADLS_ACUITY_SCORE: 40
ADLS_ACUITY_SCORE: 38
ADLS_ACUITY_SCORE: 40
ADLS_ACUITY_SCORE: 38

## 2024-05-02 NOTE — CONSULTS
"PSYCHOLOGY CONSULTATION - INITIAL NOTE  Start Time: 1330  Stop Time: 1400  Session Duration in Minutes: 30 minutes    REASON FOR CONSULTATION: Psychology consulted to assess functional status as well as emotional coping.     BACKGROUND PER EMR: Shirley Hendricks is a 63 year old right hand dominant female with complicated past medical history including but not limited to PAD with multiple prior bilateral lower extremity vascular bypass grafts and thrombectomies (most recently 10/2023 right common femoral to proximal anterior tibial PTFE bypass graft), bilateral Charcot-Breonna-Tooth foot deformity, prior bilateral carotid endarterectomies, B-cell lymphoma, CAD (hx Mix3), hypertension, hyperlipidemia, GI bleed (12/2023), type 2 diabetes mellitus, COPD, tobacco use disorder, iron deficiency anemia, GERD, Celiac disease, Takotsubo cardiomyopathy, SVT, depression/anxiety and spongiotic dermatitis who was admitted on 3/29/24 with acute occlusion of right lower extremity arterial bypass graft now s/p right above-knee amputation 4/1/24, completed 4/9/24 with hospital course complicated by acute renal failure now on dialysis, sepsis, acute blood loss anemia, acute post-operative pain, nausea, loose stools, delirium, malnutrition, and multiple electrolyte derangements. She is now admitted to ARU on 4/22/24 for multidisciplinary rehabilitation and ongoing medical management.     SUBJECTIVE: Met with Shirley in her room.  We discussed how she continues to feel down.  She feels very resolved in her desire to return home, however, her family continues to try to talk her into different options.  We discussed how she may have to put a boundary in place with her family.  In this same vein, we discussed how she needs to make at least one decision, and all other \"decisions\" and consequences fall into place and then she can deal with the bigger picture.  Similar to last week, she is concerned about going home with her , but was " equivocal on appropriate next steps in how to cope with that stressor.     OBJECTIVE: Shirley was sitting upright in her wheelchair.  Speech was generally normal with some dysarthria noted.  Thoughts were generally goal-directed and linear.  Although not formally assessed, Shirley appeared generally oriented.  Insight was challenging to assessed, but appeared fair to limited.  No SI, HI, rachel, or confusion noted.      ASSESSMENT: Shirley appears to be in the contemplation stage of change. She knows something has to change upon her discharge in order for her to focus on her health. However, she appears quite equivocal in what she is willing to commit to for change. We discussed what it would be to divorce her  versus live apart from. She appeared tearful with a low mood - understandably so given the change in her functional status combined with her social environment. She appears overwhelmed by the big picture, so when talking with her, she may benefit from breaking big problems down into component parts.     DIAGNOSIS:  Adjustment Disorder with mixed mood    RECOMMENDATION/PLAN:  Will follow with at least weekly meetings.   Shirley appears overwhelmed by the big picture, so when talking with her, she may benefit from breaking big problems down into component parts.     Please feel free to call if urgent concerns arise prior to the next follow-up session.    Jacqueline Salinas PsyD,   Clinical Neuropsychologist

## 2024-05-02 NOTE — PLAN OF CARE
FOCUS/GOAL  Bowel management, Bladder management, Pain management, Mobility, Skin integrity, and Safety management    ASSESSMENT, INTERVENTIONS AND CONTINUING PLAN FOR GOAL:  Patient is alert oriented. A-1 slid board. R AKA wound and sacral wound CDI. VS WDL last Bm 5/1. No care concern at this time. Call light is with in reach alarm is on.  Goal Outcome Evaluation:

## 2024-05-02 NOTE — PROGRESS NOTES
Patient not seen  Chart reviewed  Dialysis ordered placed for tomorrow with low dose heparin, no UF    Soo Gabriele Mejia MD

## 2024-05-02 NOTE — PLAN OF CARE
Discharge Plan: home with HH PT      Precautions: fall, NWB of RLE, sacral wound, elevate RLE in bed    Current Status:  Bed Mobility: [place]   Transfer: Slideboard SBA bed<>w/c   Gait: unable, unsafe  Stairs: not tested  Balance: Stable dynamic sitting.     Assessment: Patient unable to participate in PT today due to increased nausea/stomachache.    Other Barriers to Discharge (DME, Family Training, etc):   DME: K3 w/c (order faxed 4/30), slideboard, bedrail.     Family training: TBD. Car tx, bumping up 3STE in w/c.

## 2024-05-02 NOTE — PLAN OF CARE
Goal Outcome Evaluation:    Overall Patient Progress: no change    Outcome Evaluation: No change in pt progress this shift    Pt is A/O x4. No impulsive behavior overnight, calls appropriately to make needs known. Denied pain, SOB, chest pain, or new symptoms. Mixed continence of bowel, LBM 5/1. Minimal urine output due to dialysis. Transfers A1-2 slide board, w/c based. Left PIV is saline-locked, right CVC is WDL. Scopolamine patch to right ear. Right AKA dressing C/D/I. Pt appeared asleep between cares. Call light in reach, bed alarm on. Plan of care ongoing.

## 2024-05-02 NOTE — PLAN OF CARE
Discharge Planner Post-Acute Rehab OT:     Discharge Plan: home with assist and HH OT     Precautions: fall, NWB of RLE, dialysis, sacral wound, elevate residual limb when in bed    Current Status:  ADLs:  Mobility: CGA for slide board to WC  Grooming: IND seated at sink in w/c  Dressing: UB: set up, LB: set up in supine, assist for brace and shoe  Bathing: TBD  Toileting: SB to commode Ax1  IADLs: Pt does most IADL including caregiving for .   Vision/Cognition: intact    Assessment: Working on problem solving incontinence cares for home since pt does not have assist and is more often then not incontinent of bowel. Session limited by incontinence as well as emesis. May need to do timed toileting to get pt to commode 1st thing in the AM to prevent incontinence. -35 min    Other Barriers to Discharge (DME, Family Training, etc): DME, family training, pt has support from multiple family members and will need to schedule family training since she will most likely still require assist with toileting.

## 2024-05-02 NOTE — PROGRESS NOTES
Nephrology Progress Note  05/02/2024         Assessment & Recommendations:   Shirley Hendricks is a 63 year old year old with peripheral artery disease, coronary artery disease and history of MI, hypertension, diabetes mellitus type 2 who is admitted to ARU following hospitalization for occlusion of right LE bypass graft managed with right above-the-knee amputation and complication of acute kidney injury requiring dialysis.     #Acute kidney injury: Felt to be due to contrast nephropathy as she received contrast 3 days in a row for evaluation and management of her occluded bypass graft.  There is also potentially some component of rhabdomyolysis.  -First dialysis run 4/3/2024.  -Access tunneled CVC initially placed 4/3/2024 and revised on 4/15/2024.  -Suspicion of underlying diabetic nephropathy and/or renovascular disease.  - continue dialysis Tuesday, Thursday, Saturday- had some dizziness at end of treatment today, also had slight blood loss as blood lines clotted- will use heparin tomorrow- may be on the dry side, will get weight, so that we can monitor and avoid volume depletion as this will delay renal recovery   - diuretic challenge did not effect any diuresis- try again this weekend  - HD today but no UF as she has had poor intake-   - had been receiving hemodialysis at [facility] and consents to ongoing hemodialysis while at [facility].    -Dialysis tolerated but having nausea and vomiting in the morning, ? Medication related  - UOP once a day at this time.  Note pleural effusion on imaging and so we will challenge volume status as able  -continue to monitor for renal recovery. Please order creatinine for Tuesday 4/30 before HD         #Hyponatremia: Sodium is lower at 122 This is due to fluid intake in the absence of renal function.  Now also with nausea  - 1 L daily fluid restriction- she is making some urine and hope for recovery.     #PAD/PVD  #Right common femoral artery to the mid anterior  "tibial artery bypass graft acute occlusion  #Right above-the-knee amputation 4/9/2024  - Plavix 75 mg daily (previously on Xarelto but no ongoing indication)     #Anemia due to acute blood loss with retroperitoneal hematoma and history of GI bleeding in December 2023.  - hgb stable at 8.8 - recheck tomorrow given some blood loss today  - Has required transfusion with PRBCs.  -Has been receiving EPO 10K and Venofer with hemodialysis   Monitor hemoglobin weekly at least  - retacrit 10K units three times weekly with HD     #Coronary artery disease with history of myocardial infarction x 3  #History of Takotsubo cardiomyopathy  PTA carvedilol and lisinopril, lisinopril has been on hold.  PTA rosuvastatin was initially held due to rhabdo but resumed.  PTA empagliflozin 10 mg daily has been discontinued given acute kidney injury     #Diabetes mellitus type 2  - Not requiring medications at this time     #Stage Mark marginal zone B-cell lymphoma  - Undergoing surveillance with [place] [facility]     Recommendations were communicated to primary team via note    Soo Gabriele Mejia MD   Division of Renal Disease and Hypertension  Subimage  Vocera Web Console    Interval History :   Nursing and provider notes from last 24 hours reviewed.  In the last 24 hours Shirley is doing OK, has had emesis in the morning.      Review of Systems:   I reviewed the following systems:  GI: nausea/ vomiting  Neuro:  no confusion  Constitutional:  no fever or chills  CV: no dyspnea or edema.  no chest pain.    Physical Exam:   I/O last 3 completed shifts:  In: 240 [P.O.:240]  Out: -    BP (!) 162/65   Pulse 59   Temp 98.7  F (37.1  C) (Oral)   Resp 12   Ht 1.549 m (5' 0.98\")   Wt (!) 24.2 kg (53 lb 4.8 oz)   LMP  (LMP Unknown)   SpO2 93%   BMI 10.08 kg/m       GENERAL APPEARANCE: no distress  EYES:  no scleral icterus, pupils equal  Mouth: dry mucosa  PULM: normal work of breathing  CV: 1+ left foot edema  INTEGUMENT: no cyanosis, no " rash  NEURO:  speech fluent, face symmetric    Labs:   All labs reviewed by me  Electrolytes/Renal -   Recent Labs   Lab Test 05/02/24  0551 04/30/24  0653 04/29/24  0615 04/28/24  0039 04/27/24  0722   * 124* 126*  --  127*   POTASSIUM 4.5 4.4 4.4  --  4.5   CHLORIDE 90* 91* 92*  --  93*   CO2 23 23 24  --  25   BUN 25.3* 30.5* 21.0  --  29.0*   CR 3.74* 4.16* 3.20*  --  2.67*   GLC 81 79 79  --  80   SONIA 7.3* 7.6* 7.6*  --  7.7*   MAG 1.5* 1.9 2.0   < > 1.4*   PHOS 3.2 3.8  --   --  2.7    < > = values in this interval not displayed.       CBC -   Recent Labs   Lab Test 05/02/24  0551 05/01/24  0925 04/30/24  0653   WBC 3.6* 3.9* 3.3*   HGB 8.7* 9.3* 8.8*    222 215       LFTs -   Recent Labs   Lab Test 04/30/24  0653 04/23/24  0602 04/17/24  0636 04/15/24  0536 04/09/24  0636   ALKPHOS 100 96  --   --  155*   BILITOTAL 0.4 0.5  --   --  0.9   ALT 24 20  --   --  53*   AST 21 16  --   --  68*   PROTTOTAL 4.3* 4.1*  --   --  3.9*   ALBUMIN 2.0*  2.0* 1.9* 2.3*   < > 1.8*    < > = values in this interval not displayed.       Iron Panel -   Recent Labs   Lab Test 04/10/24  0559 04/09/24  1817 01/08/24  1542 12/11/23  0550   IRON 22*  --  71 122   IRONSAT 18  --  17 32   BRYN  --  609* 25 23       Current Medications:  Current Facility-Administered Medications   Medication Dose Route Frequency Provider Last Rate Last Admin    amLODIPine (NORVASC) tablet 5 mg  5 mg Oral Daily Corrina Hurt PA-C   5 mg at 05/02/24 0755    carvedilol (COREG) tablet 6.25 mg  6.25 mg Oral BID w/meals Corrina Hurt PA-C   6.25 mg at 05/02/24 0755    cetirizine (zyrTEC) tablet 5 mg  5 mg Oral Daily Corrina Hurt PA-C   5 mg at 05/02/24 0755    clopidogrel (PLAVIX) tablet 75 mg  75 mg Oral Daily Corrina Hurt PA-C   75 mg at 05/02/24 0755    famotidine (PEPCID) tablet 20 mg  20 mg Oral Q48H Corrina Hurt PA-C   20 mg at 04/30/24 1538    fluticasone-vilanterol (BREO ELLIPTA) 200-25 MCG/ACT  inhaler 1 puff  1 puff Inhalation Daily Corrina Hurt PA-C   1 puff at 05/01/24 0801    gabapentin (NEURONTIN) capsule 200 mg  200 mg Oral Once per day on Tuesday Thursday Saturday Corrina Hurt PA-C   200 mg at 04/30/24 2128    heparin ANTICOAGULANT injection 5,000 Units  5,000 Units Subcutaneous Q12H Corrina Hurt PA-C   5,000 Units at 05/02/24 0755    lactobacillus rhamnosus (GG) (CULTURELL) capsule 1 capsule  1 capsule Oral BID Corrina Hurt PA-C   1 capsule at 05/02/24 0755    miconazole (MICATIN) 2 % powder   Topical BID Corrina Hurt PA-C   Given at 05/01/24 0805    multivitamin RENAL (TRIPHROCAPS) capsule 1 capsule  1 capsule Oral Daily Corrina Hurt PA-C   1 capsule at 04/30/24 0817    nicotine (NICODERM CQ) 14 MG/24HR 24 hr patch 1 patch  1 patch Transdermal Daily Corrina Hurt PA-C   1 patch at 05/02/24 0753    rosuvastatin (CRESTOR) tablet 10 mg  10 mg Oral At Bedtime Corrina Hurt PA-C   10 mg at 05/01/24 2121    scopolamine (TRANSDERM) 72 hr patch 1 patch  1 patch Transdermal Q72H Corrina Hurt PA-C   1 patch at 05/02/24 0849    And    scopolamine (TRANSDERM-SCOP) Patch in Place   Transdermal Q8H Corrina Hurt PA-C        silver sulfADIAZINE (SILVADENE) 1 % cream   Topical Daily Gala Lo MD   Given at 05/01/24 0940    sodium chloride (PF) 0.9% PF flush 9 mL  9 mL Intracatheter During Dialysis/CRRT (from stock) Soo Mejia MD        sodium chloride (PF) 0.9% PF flush 9 mL  9 mL Intracatheter During Dialysis/CRRT (from stock) Soo Mejia MD         Current Facility-Administered Medications   Medication Dose Route Frequency Provider Last Rate Last Admin    heparin 10,000 units/10 mL infusion (DIALYSIS USE)  500 Units/hr Hemodialysis Machine Continuous Soo Mejia MD 0.5 mL/hr at 05/02/24 1137 500 Units/hr at 05/02/24 1137     Soo Mejia MD

## 2024-05-02 NOTE — PROGRESS NOTES
[facility]   Acute Rehabilitation Unit  Daily progress note    INTERVAL HISTORY  Shirley Hendricks was seen at bedside this morning.  No acute events reported overnight.  However, she reports that starting after she ate small breakfast (scrambled eggs and yogurt), she began feeling quite sick with nausea and vomiting.  She vomits multiple times during the course of our visit.  She reports waves of abdominal pain, primarily in epigastric area.  She notes ongoing loose stools.  Reports to be feeling warm than cold alternating.  She denies any shortness of breath.   Has not been out of bed so denies lightheadedness.  Overall her leg pain is improved.  She is still having some pain in the incisional area, but phantom pain particularly improved.  She denies dysuria, still with just small urine output.  She did not sleep well last night, has a lot on her mind regarding her social situation and discharge plans.  Denies other questions or concerns at this time.    With therapies, she is mod I with bed mobility, SBA for slide board transfers from bed<>wheelchair.  Her participation this morning was limited by bowel incontinence and emesis, then in HD this afternoon.    MEDICATIONS  Current Facility-Administered Medications   Medication Dose Route Frequency Provider Last Rate Last Admin    amLODIPine (NORVASC) tablet 5 mg  5 mg Oral Daily Corrina Hurt PA-C   5 mg at 05/01/24 0759    carvedilol (COREG) tablet 6.25 mg  6.25 mg Oral BID w/meals Corrina Hurt PA-C   6.25 mg at 05/01/24 1837    cetirizine (zyrTEC) tablet 5 mg  5 mg Oral Daily Corrina Hurt PA-C   5 mg at 05/01/24 0758    clopidogrel (PLAVIX) tablet 75 mg  75 mg Oral Daily Corrina Hurt PA-C   75 mg at 05/01/24 0759    famotidine (PEPCID) tablet 20 mg  20 mg Oral Q48H Corrina Hurt PA-C   20 mg at 04/30/24 1538    fluticasone-vilanterol (BREO ELLIPTA) 200-25 MCG/ACT inhaler 1 puff  1 puff  Inhalation Daily Corrina Hurt PA-C   1 puff at 05/01/24 0801    gabapentin (NEURONTIN) capsule 200 mg  200 mg Oral Once per day on Tuesday Thursday Saturday Corrina Hurt PA-C   200 mg at 04/30/24 2128    heparin ANTICOAGULANT injection 5,000 Units  5,000 Units Subcutaneous Q12H Corrina Hurt PA-C   5,000 Units at 05/01/24 2120    lactobacillus rhamnosus (GG) (CULTURELL) capsule 1 capsule  1 capsule Oral BID Corrina Hurt PA-C   1 capsule at 05/01/24 2121    miconazole (MICATIN) 2 % powder   Topical BID Corrina Hurt PA-C   Given at 05/01/24 0805    multivitamin RENAL (TRIPHROCAPS) capsule 1 capsule  1 capsule Oral Daily Corrina Hurt PA-C   1 capsule at 04/30/24 0817    nicotine (NICODERM CQ) 14 MG/24HR 24 hr patch 1 patch  1 patch Transdermal Daily Corrina Hurt PA-C   1 patch at 05/01/24 0758    rosuvastatin (CRESTOR) tablet 10 mg  10 mg Oral At Bedtime Corrina Hurt PA-C   10 mg at 05/01/24 2121    scopolamine (TRANSDERM) 72 hr patch 1 patch  1 patch Transdermal Q72H Corrina Hurt PA-C   1 patch at 04/29/24 0814    And    scopolamine (TRANSDERM-SCOP) Patch in Place   Transdermal Q8H Corrina Hurt PA-C        silver sulfADIAZINE (SILVADENE) 1 % cream   Topical Daily Gala Lo MD   Given at 05/01/24 0940          Current Facility-Administered Medications   Medication Dose Route Frequency Provider Last Rate Last Admin    acetaminophen (TYLENOL) tablet 500-1,000 mg  500-1,000 mg Oral Q6H PRN Corrina uHrt PA-C   1,000 mg at 05/01/24 1441    diphenoxylate-atropine (LOMOTIL) 2.5-0.025 MG per tablet 1 tablet  1 tablet Oral 4x Daily PRN Corrina Hurt PA-C   1 tablet at 04/30/24 1538    HYDROmorphone (DILAUDID) tablet 2 mg  2 mg Oral Q6H PRN Corrina Hurt PA-C   2 mg at 05/01/24 1840    naloxone (NARCAN) injection 0.2 mg  0.2 mg Intravenous Q2 Min PRN Arun Pimentel MD        Or    naloxone (NARCAN) injection 0.4 mg  0.4 mg  "Intravenous Q2 Min PRN Arun Pimentel MD        Or    naloxone (NARCAN) injection 0.2 mg  0.2 mg Intramuscular Q2 Min PRN Arun Pimentel MD        Or    naloxone (NARCAN) injection 0.4 mg  0.4 mg Intramuscular Q2 Min PRN Arun Pimentel MD        nitroGLYcerin (NITROSTAT) sublingual tablet 0.4 mg  0.4 mg Sublingual Q5 Min PRN Corrina Hurt PA-C        ondansetron (ZOFRAN ODT) ODT tab 4 mg  4 mg Oral Q6H PRN Corrina Hurt PA-C   4 mg at 04/30/24 0948    polyethylene glycol (MIRALAX) powder 17 g  17 g Oral Daily PRN Corrina Hurt PA-C        senna-docusate (SENOKOT-S/PERICOLACE) 8.6-50 MG per tablet 1-2 tablet  1-2 tablet Oral BID PRN Corrina uHrt PA-C            PHYSICAL EXAM  /57 (BP Location: Left arm)   Pulse 62   Temp 97.6  F (36.4  C) (Oral)   Resp 16   Ht 1.549 m (5' 0.98\")   Wt (!) 24.2 kg (53 lb 4.8 oz)   LMP  (LMP Unknown)   SpO2 93%   BMI 10.08 kg/m    Gen: NAD, up in chair  HEENT: NC/AT, MMM  Cardio: +dialysis catheter R chest  Pulm: non-labored on room air, lungs CTA bilaterally  Abd: soft, tender in RUE/RLE with +rebound/guarding, non-distended, +hyperactive bowel sounds  Ext: some edema in RLE; charcot foot deformity on left; R AKA site as below  Neuro/MSK: awake, alert, moving all extremities actively in bed                LABS  CBC RESULTS:   Recent Labs   Lab Test 05/02/24  0551 05/01/24  0925 04/30/24  0653   WBC 3.6* 3.9* 3.3*   RBC 2.80* 3.05* 2.88*   HGB 8.7* 9.3* 8.8*   HCT 26.9* 29.5* 27.2*   MCV 96 97 94   MCH 31.1 30.5 30.6   MCHC 32.3 31.5 32.4   RDW 18.4* 19.0* 18.4*    222 215       Last Basic Metabolic Panel:  Recent Labs   Lab Test 05/02/24  0551 04/30/24  0653 04/29/24  0615   * 124* 126*   POTASSIUM 4.5 4.4 4.4   CHLORIDE 90* 91* 92*   CO2 23 23 24   ANIONGAP 9 10 10   GLC 81 79 79   BUN 25.3* 30.5* 21.0   CR 3.74* 4.16* 3.20*   GFRESTIMATED 13* 11* 15*   SONIA 7.3* 7.6* 7.6*         Rehabilitation - continue comprehensive acute inpatient " rehabilitation program with multidisciplinary approach including therapies, rehab nursing, and physiatry following. See interval history for updates.      ASSESSMENT AND PLAN  Shirley Hendricks is a 63 year old right hand dominant female with complicated past medical history including but not limited to PAD with multiple prior bilateral lower extremity vascular bypass grafts and thrombectomies (most recently 10/2023 right common femoral to proximal anterior tibial PTFE bypass graft), bilateral Charcot-Breonna-Tooth foot deformity, prior bilateral carotid endarterectomies, B-cell lymphoma, CAD (hx Mix3), hypertension, hyperlipidemia, GI bleed (12/2023), type 2 diabetes mellitus, COPD, tobacco use disorder, iron deficiency anemia, GERD, Celiac disease, Takotsubo cardiomyopathy, SVT, depression/anxiety and spongiotic dermatitis who was admitted on 3/29/24 with acute occlusion of right lower extremity arterial bypass graft now s/p right above-knee amputation 4/1/24, completed 4/9/24 with hospital course complicated by acute renal failure now on dialysis, sepsis, acute blood loss anemia, acute post-operative pain, nausea, loose stools, delirium, malnutrition, and multiple electrolyte derangements.  She is now admitted to ARU on 4/22/24 for multidisciplinary rehabilitation and ongoing medical management.        Admission to acute inpatient rehab 04/22/24.    Impairment group code: Amputation 05.3 Unilateral LE AKA; emergent R AKA due to acute occlusion of RLE bypass graft         PT and OT 90 minutes of each daily for 6 days per week in addition to rehab nursing and close management of physiatrist.       Impairment of ADL's: Noted to have impaired activity tolerance, impaired balance, impaired strength, impaired weight shifting, and pain, all affecting her ability to safely and independently perform basic ADLs.  Goal for mod I with basic wheelchair-based ADLs with assist for bathing, as well as assist IADLs/heavier  activities.     Impairment of mobility:  Noted to have impaired activity tolerance, impaired balance, impaired strength, impaired weight shifting, and pain, all affecting her ability to safely and independently perform basic mobility.  Goal for mod I with basic wheelchair-based mobility.     Impairment of cognition/language/swallow:  Noted to have dysphagia with goals for safe tolerance of least restrictive diet.  However, IP SLP noting did not anticipate further SLP services at ARU, no issues with tolerating regular diet.  Will not consult initially.  If any concern for difficulty tolerating current diet, can consult.     Medical Conditions  New actions/orders/updates for today are in blue.     S/p right AKA 4/1/24, completed 4/9/24 due to critical limb ischemia, acute occlusion of right common femoral artery to mid anterior tibial artery bypass graft   PAD/PVD with hx multiple prior vascular interventions to BLE  Bilateral Charcot-Breonna-Tooth foot deformities  Acute post-op pain  - Wound care: daily dressing changes to right AKA with xeroform and gauze with kerlix to keep wound protected  - Dehiscence of lateral surgical incision with some increased serosanguinous drainage.  Also with multiple areas with necrotic appearance.  Overall similar to day of ARU admission although with more drainage.  Vascular surgery consulted/assessed on 4/28.  Appreciate assist.  Recommended ongoing daily dressing changes with Xeroform, gauze fluffs, loosely wrapped Kerlix.  DO NOT use ace wrap.  Stockinette can be placed into over-the-shoulder sling to prevent right AKA dressing from falling off during therapies.  Silvadene topical also added per vascular surgery on 4/29, to be applied daily to incision.  Some increased erythema at surgical site.  Vascular surgery reviewed and no acute concerns but will come and assess again tomorrow.  - Also with significant itching at surgical site.  Recently seen by derm for dermatitis as below  and recommended to trial zyrtec or claritin for itching.  Started zyrtec 5 mg daily on 4/25.  - NWB RLE  - Continue PTA L foot custom orthotic   - Continue Plavix 75 mg daily (given hx left bypass).  No ongoing need for Xarelto per vascular (no recent DVT or PE)  - Pain management: APAP 500-1000 mg q6h PRN, dilaudid 2 mg q6h PRN, gabapentin 200 mg 3x/week after HD (renally dosed).  Wean opioids as able.   - Continue PT/OT  - Follow up with vascular surgery in 2-4 weeks (~5/28)     Acute blood loss anemia on anemia of chronic disease, hx iron deficiency  Retroperitoneal hematoma  Recent GI bleed (hospitalized 12/2023)  Required intermittent transfusion during this admission (3/30, 3/31, 4/2, 4/6, 4/9, 4/13).  Repeat CT abdomen on 4/20 with stable right retroperitoneal hematoma; no s/o active bleed.  5/2: Hgb stable at 8.7  - Continue epo/venofer with HD per nephrology  - Trend CBC every T/Th/Sat  - Transfuse for Hgb <7     Acute renal failure on HD  Hyponatremia  Normal baseline Cr, though per nephrology, suspect some modest CKD.  LIMA this admission, up to peak Cr 4/22 on 4/3.  Likely BAILEY +/- rhabdo +/- ischemic injury with no signs of recovery and now dialysis dependent (started 4/3/24).  S/p tunneled dialysis catheter placement 4/3/24.  5/2: Cr 3.74; Na slightly lower at 122.  HD today.    - Nephrology consulted, appreciate ongoing assistance  - HD T/Th/Sat at ARU or per nephrology recs.  Plan for OP HD at [facility]Th[facility]Sat  - S/p diuretic challenge (Lasix 100 mg PO) on 4/29 per nephrology without improved output.  Per nephrology, planning for repeat diuretic challenge this weekend.  - Trend BMP/mag/phos on HD days  - Continue 1L fluid restriction  - Avoid NSAIDs/nephrotoxins, renally dose medications     Bilateral pleural effusion  Noted on CT abdomen 4/20 with moderate b/l pleural effusion (left>right).  Has been intermittently requiring 1-2L supplemental oxygen.  Unclear if related to volume given new  renal failure on HD, also baseline COPD.  - Monitor respiratory status, supplemental O2 by NC PRN to maintain sats >88%  - Consider thoracentesis if symptomatic or worsening hypoxia      Celiac disease  Colon distension   Loose stools, improving  Nausea/vomiting, improving  Severe malnutrition in context of acute on chronic illness  Pill cam study with [facility] in 3/2024 (due to recent GI bleed), which revealed mild non-erosive red tissue in the duodenum, some atrophic type appearance that could be celiac disease.  This was followed by celiac labs on 3/20/24 (Gliadin ab and tTG IgA antibody) which were positive and thus Celiac diagnosis was established and she was recommended for gluten-free diet.  This admission, noted to have colonic distension and circumferential wall thickening of the distal colon and rectum concerning for proctocolitis on CT abdomen.  Also with loose stools, nausea.  Despite this, patient declined gluten-free diet (switched on 4/18).  Noted to have improvement in loose stools and nausea at discharge to ARU per sending team.  However, has persisted with loose stools, intermittent nausea and vomiting.  Repeat cdiff on 4/28 negative.  Suspect symptoms are due to Celiac disease and non-compliance with gluten-free diet.  - Per discussion with weekend team on 4/28, patient willing to trial gluten-free diet, which was ordered.   - RD consulted, appreciate assistance  - Continue scopolamine patch for now (started 4/18), wean as able  - PRN Zofran  - Recurrent nausea/emesis today, ongoing loose stools.  Now with rebound/guarding in right abdomen.  CT abdomen/pelvis to further evaluate.  - Continue probiotics BID  - Continue lomotil QID PRN  - Monitor symptoms  - Follow up with [facility] as outpatient    Hypomagnesemia  Replaced mag IV on 4/23 with improvement to 2.1 and again on 4/27.  5/2: Mag slightly low at 1.5, not requiring replacement  - Continue to trend     Hypocalcemia  4/30: Ca corrects to 8.8 (low end  of normal) for hypoalbuminemia  - Per pharmacy, was on PTA calcium carbonate/vit D PTA (had run out 1 week before admission), not ordered while at hospital.  If corrected Ca remains low, reach out to nephrology to consider if resumption indicated  - Continue to trend    CAD (hx MI x3)  HTN  Hyperlipidemia  Hx Takotsubo CM  Hx SVT  Last coronary angiogram completed 10/2023.  Recovered EF (55-60%) from ECHO 11/2023.  - Continue PTA Coreg 6.25 mg BID, amlodipine 5 mg daily  - Hold PTA lisinopril 10 mg daily due to renal failure  - PTA Jardiance 10 mg daily discontinued due to renal failure  - Rosuvastatin 10 mg daily resumed on ARU admission (previously held due to concern for rhabdo).  Monitor hepatic panel in 1 week    - Monitor BP  - Was to follow up with cardiology in Feb 2024, should be scheduled after discharge    Murmur  Noted on exam this admission by attending physiatrist.  Per chart review, intermittently documented in the past with questionable/subtle murmur (though not by cardiology).  Most recent echo 11/7/23 with trace mitral regurg, trace tricuspid regurg, and moderate trileaflet aortic sclerosis.  - Folllow up with cardiology as above     Hx bilateral carotid artery stenosis s/p bilateral carotid endarterectomies  - Follow up with vascular surgery for annual carotid duplex (last done on 1/4/24 with stable stenosis of right carotid bulb)     Diabetes mellitus, type II  Per chart review.  A1c this admission 5.2%.  PTA Jardiance and gabapentin discontinued due to acute renal failure.  BG well-controlled during this admission without need for insulin.  - Monitor BG periodically with labs     B-cell lymphoma, stage VALENTIN  Followed by [facility] (Dr. Barrow).  Mild radiographic progression on CT 1/26/24.  Given asymptomatic, plans at that time for ongoing CT monitoring.  - Monitor for development of any B symptoms  - Trend CBC  - Follow up with oncology, repeat CT as planned in 7/2024     COPD  Tobacco use  disorder  PTA smoking ~5 cigarettes per day  - Monitor respiratory status, supplemental O2 by NC PRN to maintain sats >88%  - Continue PTA Breo Ellipta  - Nicotine patch 14 mcg/day  - Ongoing education/resources for cessation     Adjustment disorder with mixed mood  Hx MDD/anxiety (per chart though patient denies)  Delirium, resolved  Not on medications PTA.  Patient denies any hx depression/anxiety but does note depressed mood in setting of recent AKA and significant home stressors.  - Psychology and spiritual services consulted, appreciate assist  - Monitor mood     GERD  PTA on omeprazole 20 mg daily  - Continue pepcid 20 mg q48 hours (renally dosed) given allergy to pantoprazole (formulary sub for omeprazole)     Spongiotic dermatitis  Recently seen by OP dermatology, recommended betamethasone cream BID PRN, not using regularly at hospital  - Consider resumption of topical steroids if recurrent symptoms  - Started Zyrtec 4/25 as above for itching near surgical site     Sacral wound: pressure injury (stage 2 vs 3), incontinence-associated dermatitis, moisture-associated skin damage  Assessed by WOCN on 4/22 at acute hospital.  - WOCN consulted for ongoing follow-up at ARU  - Wound care per WOCN orders  - Pressure reduction strategies     Adjustment to disability:  Clinical psychology to eval and treat if indicated  FEN: gluten free diet, 1000 mL fluid restriction  Bowel: incontinent, recent loose stools as above  Bladder: incontinent  DVT Prophylaxis: subcutaneous heparin  GI Prophylaxis: pepcid  Code: full, confirmed on admission  Disposition: goal for home  ELOS:  target 5/13/24  Follow up Appointments on Discharge: PCP in 1-2 weeks, vascular surgery in 2 weeks, [facility], nephrology (ongoing HD), heme/onc ([facility], 7/2024)      30 minutes spent on the date of service doing chart review, history and exam, documentation, and further activities as noted above.    Plan of care was discussed with Dr. Arun Pimentel, PM&R  staff physician     DARREL Cantrell-C  Physical Medicine & Rehabilitation

## 2024-05-02 NOTE — PLAN OF CARE
Goal Outcome Evaluation:      Plan of Care Reviewed With: patient    Overall Patient Progress: no change    Outcome Evaluation: Pt advocating for pain management, Dressing to stump and coccyx changed by writer today, continues to need encouragement to go from side to side in bed, continues to be safe using call light and waiting for assistance.    FOCUS/GOAL  Pain management     ASSESSMENT, INTERVENTIONS AND CONTINUING PLAN FOR GOAL:  Pt Aox4, using call light to make needs known. A1 slide board. Reporting pain to stump controlled with PRN dilaudid. Denies cough, sob, chest pain.  Incont of BM, LBM today not voiding much due to dialysis.  CVC in place to R chest for dialysis tue/thurs/sat. Pt went to Ct after dialysis due to having emesis before leaving for dialysis and zofran was ineffective, CT results paged to on call in PM no new orders at this time, pt comfortable and no longer having emesis after dialysis. Reg/thin, taking pills whole with water.  Working with therapies. Nursing will continue with POC.

## 2024-05-02 NOTE — PROGRESS NOTES
Date: 5/2/2024  Time: 1530     Data:  Pre Wt:   53.3 kg  Post Wt:  0 kg   Weight change: - 0 kg  Ultrafiltration - Post Run Net Total Removed (mL):  0 ml  Vascular Access Status: CVC patent  Dialyzer Rinse:  Light  Total Blood Volume Processed: 79.2 L   Total Dialysis (Treatment) Time:   3 Hrs  Dialysate Bath: K 2, Ca 2.5  Heparin: Heparin 500 units loading + 500 units/hr     Lab:   No    Interventions:  Dialysis done through right CVC  UF set to 0 Liters of fluid removal, accommodating priming and rinse back volumes  ,   Meds administered per MAR  CVC dressing changed aseptically  See Flowsheet for Crit Profile throughout the run    Treatment has ended safely and  blood is rinsed back completely  Catheter lumens flushed with saline and locked with saline, catheter caps (ClearGuard ) changed post HD  Report given to PCN, pt sent to CT in stable condition     Assessment:  A/O x 4, calm & cooperative, denies pain  CVC intact, previous dressing clean and dry                Plan:    Per Renal team    Bertin Burks,RN,BSN.  Acute Dialysis RN  [facility]

## 2024-05-02 NOTE — PROGRESS NOTES
[employer] mobility application picked up at pt bedside. Anticipate SW inability to complete the second half today. Will follow-up, complete, send, and add details to AVS once done and update pt/pt family. No other immediate needs at this time. Will remain available and continue to follow.     [facility]    PH: [phone] & Fax: [phone]  Email: [email or web address]

## 2024-05-03 ENCOUNTER — APPOINTMENT (OUTPATIENT)
Dept: PHYSICAL THERAPY | Facility: CLINIC | Age: 64
DRG: 559 | End: 2024-05-03
Attending: PHYSICAL MEDICINE & REHABILITATION
Payer: COMMERCIAL

## 2024-05-03 ENCOUNTER — APPOINTMENT (OUTPATIENT)
Dept: OCCUPATIONAL THERAPY | Facility: CLINIC | Age: 64
DRG: 559 | End: 2024-05-03
Attending: PHYSICAL MEDICINE & REHABILITATION
Payer: COMMERCIAL

## 2024-05-03 LAB
ALBUMIN UR-MCNC: 200 MG/DL
APPEARANCE UR: ABNORMAL
BACTERIA #/AREA URNS HPF: ABNORMAL /HPF
BILIRUB UR QL STRIP: NEGATIVE
COLOR UR AUTO: ABNORMAL
GLUCOSE UR STRIP-MCNC: NEGATIVE MG/DL
HGB UR QL STRIP: ABNORMAL
KETONES UR STRIP-MCNC: NEGATIVE MG/DL
LEUKOCYTE ESTERASE UR QL STRIP: ABNORMAL
NITRATE UR QL: NEGATIVE
PH UR STRIP: 6 [PH] (ref 5–7)
RBC URINE: 17 /HPF
SP GR UR STRIP: 1.02 (ref 1–1.03)
UROBILINOGEN UR STRIP-MCNC: NORMAL MG/DL
WBC CLUMPS #/AREA URNS HPF: PRESENT /HPF
WBC URINE: >182 /HPF
YEAST #/AREA URNS HPF: ABNORMAL /HPF
YEAST #/AREA URNS HPF: ABNORMAL /HPF

## 2024-05-03 PROCEDURE — 97535 SELF CARE MNGMENT TRAINING: CPT | Mod: GO

## 2024-05-03 PROCEDURE — 99232 SBSQ HOSP IP/OBS MODERATE 35: CPT | Performed by: PHYSICIAN ASSISTANT

## 2024-05-03 PROCEDURE — 250N000011 HC RX IP 250 OP 636: Performed by: PHYSICAL MEDICINE & REHABILITATION

## 2024-05-03 PROCEDURE — 128N000003 HC R&B REHAB

## 2024-05-03 PROCEDURE — 81001 URINALYSIS AUTO W/SCOPE: CPT | Performed by: PHYSICIAN ASSISTANT

## 2024-05-03 PROCEDURE — 99024 POSTOP FOLLOW-UP VISIT: CPT | Performed by: NURSE PRACTITIONER

## 2024-05-03 PROCEDURE — G0463 HOSPITAL OUTPT CLINIC VISIT: HCPCS

## 2024-05-03 PROCEDURE — 87106 FUNGI IDENTIFICATION YEAST: CPT | Performed by: PHYSICIAN ASSISTANT

## 2024-05-03 PROCEDURE — 97530 THERAPEUTIC ACTIVITIES: CPT | Mod: GP

## 2024-05-03 PROCEDURE — 250N000011 HC RX IP 250 OP 636: Performed by: PHYSICIAN ASSISTANT

## 2024-05-03 PROCEDURE — 250N000013 HC RX MED GY IP 250 OP 250 PS 637: Performed by: PHYSICIAN ASSISTANT

## 2024-05-03 RX ORDER — AMOXICILLIN 250 MG
1-2 CAPSULE ORAL 2 TIMES DAILY
Status: DISCONTINUED | OUTPATIENT
Start: 2024-05-03 | End: 2024-05-15 | Stop reason: HOSPADM

## 2024-05-03 RX ORDER — POLYETHYLENE GLYCOL 3350 17 G/17G
17 POWDER, FOR SOLUTION ORAL ONCE
Status: COMPLETED | OUTPATIENT
Start: 2024-05-03 | End: 2024-05-03

## 2024-05-03 RX ORDER — MAGNESIUM SULFATE 1 G/100ML
1 INJECTION INTRAVENOUS ONCE
Qty: 100 ML | Refills: 0 | Status: COMPLETED | OUTPATIENT
Start: 2024-05-03 | End: 2024-05-03

## 2024-05-03 RX ADMIN — SILVER SULFADIAZINE: 10 CREAM TOPICAL at 13:39

## 2024-05-03 RX ADMIN — POLYETHYLENE GLYCOL 3350 17 G: 17 POWDER, FOR SOLUTION ORAL at 13:52

## 2024-05-03 RX ADMIN — Medication 1 CAPSULE: at 10:21

## 2024-05-03 RX ADMIN — SENNOSIDES AND DOCUSATE SODIUM 2 TABLET: 8.6; 5 TABLET ORAL at 20:30

## 2024-05-03 RX ADMIN — HYDROMORPHONE HYDROCHLORIDE 2 MG: 2 TABLET ORAL at 13:52

## 2024-05-03 RX ADMIN — ROSUVASTATIN CALCIUM 10 MG: 5 TABLET, COATED ORAL at 20:30

## 2024-05-03 RX ADMIN — CLOPIDOGREL BISULFATE 75 MG: 75 TABLET ORAL at 10:21

## 2024-05-03 RX ADMIN — MAGNESIUM SULFATE HEPTAHYDRATE 1 G: 1 INJECTION, SOLUTION INTRAVENOUS at 22:25

## 2024-05-03 RX ADMIN — AMLODIPINE BESYLATE 5 MG: 5 TABLET ORAL at 10:21

## 2024-05-03 RX ADMIN — CETIRIZINE HYDROCHLORIDE 5 MG: 5 TABLET ORAL at 10:21

## 2024-05-03 RX ADMIN — HEPARIN SODIUM 5000 UNITS: 5000 INJECTION, SOLUTION INTRAVENOUS; SUBCUTANEOUS at 10:21

## 2024-05-03 RX ADMIN — CARVEDILOL 6.25 MG: 6.25 TABLET, FILM COATED ORAL at 18:35

## 2024-05-03 RX ADMIN — HEPARIN SODIUM 5000 UNITS: 5000 INJECTION, SOLUTION INTRAVENOUS; SUBCUTANEOUS at 20:30

## 2024-05-03 RX ADMIN — CARVEDILOL 6.25 MG: 6.25 TABLET, FILM COATED ORAL at 10:21

## 2024-05-03 RX ADMIN — FLUTICASONE FUROATE AND VILANTEROL TRIFENATATE 1 PUFF: 200; 25 POWDER RESPIRATORY (INHALATION) at 10:30

## 2024-05-03 RX ADMIN — NICOTINE 1 PATCH: 14 PATCH, EXTENDED RELEASE TRANSDERMAL at 10:20

## 2024-05-03 RX ADMIN — Medication 1 CAPSULE: at 20:30

## 2024-05-03 RX ADMIN — MICONAZOLE NITRATE: 20 POWDER TOPICAL at 20:42

## 2024-05-03 ASSESSMENT — ACTIVITIES OF DAILY LIVING (ADL)
ADLS_ACUITY_SCORE: 40
ADLS_ACUITY_SCORE: 39
ADLS_ACUITY_SCORE: 40
ADLS_ACUITY_SCORE: 40
ADLS_ACUITY_SCORE: 39
ADLS_ACUITY_SCORE: 40
ADLS_ACUITY_SCORE: 39
ADLS_ACUITY_SCORE: 40
ADLS_ACUITY_SCORE: 40
ADLS_ACUITY_SCORE: 39
ADLS_ACUITY_SCORE: 40
ADLS_ACUITY_SCORE: 40
ADLS_ACUITY_SCORE: 39
ADLS_ACUITY_SCORE: 40

## 2024-05-03 NOTE — PROGRESS NOTES
[facility]   Acute Rehabilitation Unit  Daily progress note    INTERVAL HISTORY  Shirley Hendricks was seen at bedside this morning.  No acute events reported overnight.  Patient reports that she did not have a very good night of sleep, felt this was due to sleeping too much during the day.  She reports having a good breakfast this morning and no recurrent abdominal pain, nausea, or vomiting.  She is still having loose stools, just one episode so far this morning.  She notes some lightheadedness when first up this morning.  Is having some increased right leg pain, especially in anterior thigh.  She has not urinated yet today.  She denies any shortness of breath.  Reviewed results of her CT from yesterday.  She is in agreement with plan to promote increased colonic transit/emptying and knows this may mean a (temporary) increase in stooling.  She is open to holding scopolamine patch as she does not feel it has been helpful for her nausea and discussed it could contribute to constipation.  She does note that she has had more chronic issues with constipation, when she previously had colonoscopy, she did not empty with bowel prep as expected.  Also discussed endometrial thickness.  She went through menopause >20 years ago and denies any bleeding.  Also reviewed bladder wall thickening.  She still has minimal urine output, denies dysuria when she does.  Denies other questions or concerns at this time.    With therapies, PT notes she was upgraded to Jose bed<>w/c with slideboard. Remains Siddharth for commode transfers. Progressing with w/c endurance.     MEDICATIONS  Current Facility-Administered Medications   Medication Dose Route Frequency Provider Last Rate Last Admin    amLODIPine (NORVASC) tablet 5 mg  5 mg Oral Daily Corrina Hurt PA-C   5 mg at 05/02/24 7485    carvedilol (COREG) tablet 6.25 mg  6.25 mg Oral BID w/meals Corrina Hurt PA-C   6.25 mg at 05/02/24 1616     cetirizine (zyrTEC) tablet 5 mg  5 mg Oral Daily Corrina Hurt PA-C   5 mg at 05/02/24 0755    clopidogrel (PLAVIX) tablet 75 mg  75 mg Oral Daily Corrina Hurt PA-C   75 mg at 05/02/24 0755    famotidine (PEPCID) tablet 20 mg  20 mg Oral Q48H Corrina Hurt PA-C   20 mg at 05/02/24 1606    fluticasone-vilanterol (BREO ELLIPTA) 200-25 MCG/ACT inhaler 1 puff  1 puff Inhalation Daily Corrina Hurt PA-C   1 puff at 05/01/24 0801    gabapentin (NEURONTIN) capsule 200 mg  200 mg Oral Once per day on Tuesday Thursday Saturday Corrina Hurt PA-C   200 mg at 05/02/24 2029    heparin ANTICOAGULANT injection 5,000 Units  5,000 Units Subcutaneous Q12H Corrina Hurt PA-C   5,000 Units at 05/02/24 2029    lactobacillus rhamnosus (GG) (CULTURELL) capsule 1 capsule  1 capsule Oral BID Corrina Hurt PA-C   1 capsule at 05/02/24 2029    miconazole (MICATIN) 2 % powder   Topical BID Corrina Hurt PA-C   Given at 05/02/24 2029    multivitamin RENAL (TRIPHROCAPS) capsule 1 capsule  1 capsule Oral Daily Corrina Hurt PA-C   1 capsule at 04/30/24 0817    nicotine (NICODERM CQ) 14 MG/24HR 24 hr patch 1 patch  1 patch Transdermal Daily Corrina Hurt PA-C   1 patch at 05/02/24 0753    rosuvastatin (CRESTOR) tablet 10 mg  10 mg Oral At Bedtime Corrina Hurt PA-C   10 mg at 05/02/24 2029    scopolamine (TRANSDERM) 72 hr patch 1 patch  1 patch Transdermal Q72H Corrina Hurt PA-C   1 patch at 05/02/24 0849    And    scopolamine (TRANSDERM-SCOP) Patch in Place   Transdermal Q8H Corrina Hurt PA-C        silver sulfADIAZINE (SILVADENE) 1 % cream   Topical Daily Teresita, Gala, MD   Given at 05/01/24 0999          Current Facility-Administered Medications   Medication Dose Route Frequency Provider Last Rate Last Admin    acetaminophen (TYLENOL) tablet 500-1,000 mg  500-1,000 mg Oral Q6H PRN Corrina Hurt PA-C   1,000 mg at 05/01/24 1222     "diphenoxylate-atropine (LOMOTIL) 2.5-0.025 MG per tablet 1 tablet  1 tablet Oral 4x Daily PRN Corrina Hurt PA-C   1 tablet at 04/30/24 1538    HYDROmorphone (DILAUDID) tablet 2 mg  2 mg Oral Q6H PRN Corrina Hurt PA-C   2 mg at 05/02/24 1726    naloxone (NARCAN) injection 0.2 mg  0.2 mg Intravenous Q2 Min PRN Arun Pimentel MD        Or    naloxone (NARCAN) injection 0.4 mg  0.4 mg Intravenous Q2 Min PRN Arun Pimentel MD        Or    naloxone (NARCAN) injection 0.2 mg  0.2 mg Intramuscular Q2 Min PRN Arun Pimentel MD        Or    naloxone (NARCAN) injection 0.4 mg  0.4 mg Intramuscular Q2 Min PRN Arun Pimentel MD        nitroGLYcerin (NITROSTAT) sublingual tablet 0.4 mg  0.4 mg Sublingual Q5 Min PRN Corrina Hurt PA-C        ondansetron (ZOFRAN ODT) ODT tab 4 mg  4 mg Oral Q6H PRN Corrina Hurt PA-C   4 mg at 05/02/24 0849    polyethylene glycol (MIRALAX) powder 17 g  17 g Oral Daily PRN Corrina Hurt PA-C        senna-docusate (SENOKOT-S/PERICOLACE) 8.6-50 MG per tablet 1-2 tablet  1-2 tablet Oral BID PRN Corrina Hurt PA-C            PHYSICAL EXAM  BP (!) 154/61   Pulse 64   Temp 98.2  F (36.8  C) (Oral)   Resp 15   Ht 1.549 m (5' 0.98\")   Wt 50 kg (110 lb 3.7 oz)   LMP  (LMP Unknown)   SpO2 94%   BMI 20.84 kg/m    Gen: NAD, up in chair  HEENT: NC/AT, MMM  Cardio: RRR, no murmurs, +dialysis catheter R chest  Pulm: non-labored on room air, lungs CTA bilaterally  Abd: soft, tender in RLE, today with no rebound/guarding, non-distended, +hyperactive bowel sounds  Ext: some edema in RLE; charcot foot deformity on left; R AKA site not visualized today as vascular surgery coming to assess  Neuro/MSK: awake, alert, moving all extremities actively in bed      LABS  CBC RESULTS:   Recent Labs   Lab Test 05/02/24  0551 05/01/24  0925 04/30/24  0653   WBC 3.6* 3.9* 3.3*   RBC 2.80* 3.05* 2.88*   HGB 8.7* 9.3* 8.8*   HCT 26.9* 29.5* 27.2*   MCV 96 97 94   MCH 31.1 30.5 30.6   MCHC 32.3 " 31.5 32.4   RDW 18.4* 19.0* 18.4*    222 215       Last Basic Metabolic Panel:  Recent Labs   Lab Test 05/02/24  0551 04/30/24  0653 04/29/24  0615   * 124* 126*   POTASSIUM 4.5 4.4 4.4   CHLORIDE 90* 91* 92*   CO2 23 23 24   ANIONGAP 9 10 10   GLC 81 79 79   BUN 25.3* 30.5* 21.0   CR 3.74* 4.16* 3.20*   GFRESTIMATED 13* 11* 15*   SONIA 7.3* 7.6* 7.6*         Rehabilitation - continue comprehensive acute inpatient rehabilitation program with multidisciplinary approach including therapies, rehab nursing, and physiatry following. See interval history for updates.      ASSESSMENT AND PLAN  Shirley Hendricks is a 63 year old right hand dominant female with complicated past medical history including but not limited to PAD with multiple prior bilateral lower extremity vascular bypass grafts and thrombectomies (most recently 10/2023 right common femoral to proximal anterior tibial PTFE bypass graft), bilateral Charcot-Breonna-Tooth foot deformity, prior bilateral carotid endarterectomies, B-cell lymphoma, CAD (hx Mix3), hypertension, hyperlipidemia, GI bleed (12/2023), type 2 diabetes mellitus, COPD, tobacco use disorder, iron deficiency anemia, GERD, Celiac disease, Takotsubo cardiomyopathy, SVT, depression/anxiety and spongiotic dermatitis who was admitted on 3/29/24 with acute occlusion of right lower extremity arterial bypass graft now s/p right above-knee amputation 4/1/24, completed 4/9/24 with hospital course complicated by acute renal failure now on dialysis, sepsis, acute blood loss anemia, acute post-operative pain, nausea, loose stools, delirium, malnutrition, and multiple electrolyte derangements.  She is now admitted to ARU on 4/22/24 for multidisciplinary rehabilitation and ongoing medical management.        Admission to acute inpatient rehab 04/22/24.    Impairment group code: Amputation 05.3 Unilateral LE AKA; emergent R AKA due to acute occlusion of RLE bypass graft         PT and OT 90 minutes  of each daily for 6 days per week in addition to rehab nursing and close management of physiatrist.       Impairment of ADL's: Noted to have impaired activity tolerance, impaired balance, impaired strength, impaired weight shifting, and pain, all affecting her ability to safely and independently perform basic ADLs.  Goal for mod I with basic wheelchair-based ADLs with assist for bathing, as well as assist IADLs/heavier activities.     Impairment of mobility:  Noted to have impaired activity tolerance, impaired balance, impaired strength, impaired weight shifting, and pain, all affecting her ability to safely and independently perform basic mobility.  Goal for mod I with basic wheelchair-based mobility.     Impairment of cognition/language/swallow:  Noted to have dysphagia with goals for safe tolerance of least restrictive diet.  However, IP SLP noting did not anticipate further SLP services at ARU, no issues with tolerating regular diet.  Will not consult initially.  If any concern for difficulty tolerating current diet, can consult.     Medical Conditions  New actions/orders/updates for today are in blue.     S/p right AKA 4/1/24, completed 4/9/24 due to critical limb ischemia, acute occlusion of right common femoral artery to mid anterior tibial artery bypass graft   PAD/PVD with hx multiple prior vascular interventions to BLE  Bilateral Charcot-Breonna-Tooth foot deformities  Acute post-op pain  - Wound care: daily dressing changes to right AKA with xeroform and gauze with kerlix to keep wound protected  - Dehiscence of lateral surgical incision with some increased serosanguinous drainage.  Also with multiple areas with necrotic appearance.  Overall similar to day of ARU admission although with more drainage.  Vascular surgery consulted/assessed on 4/28.  Appreciate assist.  Recommended ongoing daily dressing changes with Xeroform, gauze fluffs, loosely wrapped Kerlix.  DO NOT use ace wrap.  Stockinette can be  placed into over-the-shoulder sling to prevent right AKA dressing from falling off during therapies.  Silvadene topical also added per vascular surgery on 4/29, to be applied daily to incision.  Some increased erythema noted at surgical site on 5/2.  Vascular surgery reassessed today and felt overall appearance not concerning.  Will likely eventually need debridement of anterior thigh but that will be deferred/evaluated at OP follow-up with Dr. Hernandez.  No indication for antibiotics at this time.  Continue current plan of care.  - Also with significant itching at surgical site.  Recently seen by derm for dermatitis as below and recommended to trial zyrtec or claritin for itching.  Started zyrtec 5 mg daily on 4/25.  - NWB RLE  - Continue PTA L foot custom orthotic   - Continue Plavix 75 mg daily (given hx left bypass).  No ongoing need for Xarelto per vascular (no recent DVT or PE)  - Pain management: APAP 500-1000 mg q6h PRN, dilaudid 2 mg q6h PRN, gabapentin 200 mg 3x/week after HD (renally dosed).  Wean opioids as able.   - Continue PT/OT  - Follow up with vascular surgery in 2-4 weeks (~5/28)     Acute blood loss anemia on anemia of chronic disease, hx iron deficiency  Retroperitoneal hematoma  Recent GI bleed (hospitalized 12/2023)  Required intermittent transfusion during this admission (3/30, 3/31, 4/2, 4/6, 4/9, 4/13).  Repeat CT abdomen on 4/20 with stable right retroperitoneal hematoma; no s/o active bleed.  5/2: Hgb stable at 8.7  - Repeat CT abd/pelvis on 5/2 demonstrated decreased small right retroperitoneal hematoma (iatrogenic)  - Continue epo/venofer with HD per nephrology  - Trend CBC every T/Th/Sat  - Transfuse for Hgb <7     Acute renal failure on HD  Hyponatremia  Normal baseline Cr, though per nephrology, suspect some modest CKD.  LIMA this admission, up to peak Cr 4/22 on 4/3.  Likely BAILEY +/- rhabdo +/- ischemic injury with no signs of recovery and now dialysis dependent (started 4/3/24).  S/p  tunneled dialysis catheter placement 4/3/24.  5/2: Cr 3.74; Na slightly lower at 122.    - Nephrology consulted, appreciate ongoing assistance  - HD T/Th/Sat at ARU or per nephrology recs.  Plan for OP [facility] at [facility]Th/Sat  - S/p diuretic challenge (Lasix 100 mg PO) on 4/29 per nephrology without improved output.  Per nephrology, planning for repeat diuretic challenge this weekend.   Evidence of fluid overload on CT abd/pelv 5/2 with mod pleural effusions, small volume ascites, diffuse soft tissue anasarca.  - Trend BMP/mag/phos on HD days  - Continue 1L fluid restriction  - Avoid NSAIDs/nephrotoxins, renally dose medications     Bilateral pleural effusion  Noted on CT abdomen 4/20 with moderate b/l pleural effusion (left>right).  Has been intermittently requiring 1-2L supplemental oxygen.  Unclear if related to volume given new renal failure on HD, also baseline COPD.  - Repeat CT abd/pelv on 5/2 with moderate pleural effusions  - Monitor respiratory status, supplemental O2 by NC PRN to maintain sats >88%  - Consider thoracentesis if symptomatic or worsening hypoxia      Celiac disease  Colon distension   Loose stools, improving  Nausea/vomiting, improving  Severe malnutrition in context of acute on chronic illness  Pill cam study with [facility] in 3/2024 (due to recent GI bleed), which revealed mild non-erosive red tissue in the duodenum, some atrophic type appearance that could be celiac disease.  This was followed by celiac labs on 3/20/24 (Gliadin ab and tTG IgA antibody) which were positive and thus Celiac diagnosis was established and she was recommended for gluten-free diet.  This admission, noted to have colonic distension and circumferential wall thickening of the distal colon and rectum concerning for proctocolitis on CT abdomen.  Also with loose stools, nausea.  Despite this, patient declined gluten-free diet (switched on 4/18).  Noted to have improvement in loose stools and nausea at discharge  "to ARU per sending team.  However, has persisted with loose stools, intermittent nausea and vomiting.  Repeat cdiff on 4/28 negative.  Suspect symptoms are due to Celiac disease and non-compliance with gluten-free diet.  Patient agreeable to trial of gluten-free diet started on 4/28.  Due to recurrent nausea/emesis, ongoing loose stools, + new rebound/guarding in right abdomen, CT abdomen/pelvis obtained on 5/2 which showed \"persistent wall thickening of the descending and sigmoid colon in addition to the rectum, similar to prior study. This may also be due  to overall third spacing of fluid versus colitis given diarrhea history. Large volume of stool.\"  Loose stools could represent obstructive diarrhea.    - Continue gluten-free diet  - RD consulted, appreciate assistance  - Discontinue scopolamine patch.  Patient does not feel this was helpful for nausea/vomiting and could contribute to slow colonic transit.  - Hold Lomotil  - Suspect that stool burden currently too proximal for suppository or enema to be effective.  Discussed options for PO meds with pharmacy.  Given hyponatremia, would advise against lactulose.  Will give Miralax x1 and start on senokot-S 1-2 tabs BID scheduled.  Could consider enema in 1-2 days.  May need to reassess with repeat AXR to monitor progress and determine appropriate timing to reduce meds.  - Given ongoing nausea/vomiting and abdominal/pelvic pain, as well as bladder wall thickening on CT, UA obtained to rule out UTI.   Results with large proteinuria, negative nitrites, small hematuria, large leuk esterase, many bacteria.  Given low UOP in setting of HD and not clear UTI symptoms, will await culture results before considering treatment.    - PRN Zofran  - Continue probiotics BID  - Monitor symptoms  - Follow up with [facility] as outpatient    Hypomagnesemia  Replaced mag IV on 4/23 with improvement to 2.1 and again on 4/27.  5/2: Mag slightly low at 1.5, not requiring replacement  - " Continue to trend     Hypocalcemia  4/30: Ca corrects to 8.8 (low end of normal) for hypoalbuminemia  - Per pharmacy, was on PTA calcium carbonate/vit D PTA (had run out 1 week before admission), not ordered while at hospital.  If corrected Ca remains low, reach out to nephrology to consider if resumption indicated  - Continue to trend    CAD (hx MI x3)  HTN  Hyperlipidemia  Hx Takotsubo CM  Hx SVT  Last coronary angiogram completed 10/2023.  Recovered EF (55-60%) from ECHO 11/2023.  - Continue PTA Coreg 6.25 mg BID, amlodipine 5 mg daily  - Hold PTA lisinopril 10 mg daily due to renal failure  - PTA Jardiance 10 mg daily discontinued due to renal failure  - Rosuvastatin 10 mg daily resumed on ARU admission (previously held due to concern for rhabdo).  Monitor hepatic panel in 1 week    - Monitor BP  - Was to follow up with cardiology in Feb 2024, should be scheduled after discharge    Murmur  Noted on exam this admission by attending physiatrist.  Per chart review, intermittently documented in the past with questionable/subtle murmur (though not by cardiology).  Most recent echo 11/7/23 with trace mitral regurg, trace tricuspid regurg, and moderate trileaflet aortic sclerosis.  - Folllow up with cardiology as above     Hx bilateral carotid artery stenosis s/p bilateral carotid endarterectomies  - Follow up with vascular surgery for annual carotid duplex (last done on 1/4/24 with stable stenosis of right carotid bulb)     Diabetes mellitus, type II  Per chart review.  A1c this admission 5.2%.  PTA Jardiance and gabapentin discontinued due to acute renal failure.  BG well-controlled during this admission without need for insulin.  - Monitor BG periodically with labs     B-cell lymphoma, stage VALENTIN  Followed by [facility] (Dr. Barrow).  Mild radiographic progression on CT 1/26/24.  Given asymptomatic, plans at that time for ongoing CT monitoring.  - Monitor for development of any B symptoms  - Trend CBC  - Follow  up with oncology, repeat CT as planned in 7/2024     COPD  Tobacco use disorder  PTA smoking ~5 cigarettes per day  - Monitor respiratory status, supplemental O2 by NC PRN to maintain sats >88%  - Continue PTA Breo Ellipta  - Nicotine patch 14 mcg/day  - Ongoing education/resources for cessation     Adjustment disorder with mixed mood  Hx MDD/anxiety (per chart though patient denies)  Delirium, resolved  Not on medications PTA.  Patient denies any hx depression/anxiety but does note depressed mood in setting of recent AKA and significant home stressors.  - Psychology and spiritual services consulted, appreciate assist  - Monitor mood     GERD  PTA on omeprazole 20 mg daily  - Continue pepcid 20 mg q48 hours (renally dosed) given allergy to pantoprazole (formulary sub for omeprazole)     Spongiotic dermatitis  Recently seen by OP dermatology, recommended betamethasone cream BID PRN, not using regularly at hospital  - Consider resumption of topical steroids if recurrent symptoms  - Started Zyrtec 4/25 as above for itching near surgical site     Sacral wound: pressure injury (stage 2 vs 3), incontinence-associated dermatitis, moisture-associated skin damage  Assessed by [facility] on 4/22 at [facility] hospital.  - WOCN consulted for ongoing follow-up at ARU  - Wound care per WOCN orders  - Pressure reduction strategies    Endometrial thickening  Noted incidentally on CT abdomen/pelvis.  Patient denies postmenopausal bleeding.  - PCP to follow-up, consider outpatient pelvic ultrasound to better assess     Adjustment to disability:  Clinical psychology to eval and treat if indicated  FEN: gluten free diet, 1000 mL fluid restriction  Bowel: incontinent, recent loose stools as above  Bladder: incontinent  DVT Prophylaxis: subcutaneous heparin  GI Prophylaxis: pepcid  Code: full, confirmed on admission  Disposition: goal for home  ELOS:  target 5/13/24  Follow up Appointments on Discharge: PCP in 1-2 weeks, vascular surgery in 2  weeks, [facility], nephrology (ongoing HD), heme/onc ([facility], 7/2024)      40 minutes spent on the date of service doing chart review, history and exam, documentation, and further activities as noted above.    Plan of care was discussed with Dr. Arun Pimentel, PM&R staff physician and Shani Kim, vascular surgery team    DARREL Cantrell-C  Physical Medicine & Rehabilitation

## 2024-05-03 NOTE — PLAN OF CARE
Discharge Plan: home with HH PT      Precautions: fall, NWB of RLE, sacral wound, elevate RLE in bed    Current Status:  Bed Mobility: Humaira   Transfer: Slideboard SBA bed<>w/c   Gait: unable, unsafe  Stairs: not tested  Balance: Stable dynamic sitting.     Assessment: Upgraded to Humaira bed<>w/c w/ slideboard. Continue Siddharth for commode transfers. Progressing w/ w/c endurance.     Other Barriers to Discharge (DME, Family Training, etc):   DME: K3 w/c (order faxed 4/30), slideboard, bedrail.     Family training: TBD. Car tx, bumping up 3STE in w/c.

## 2024-05-03 NOTE — PLAN OF CARE
Discharge Planner Post-Acute Rehab OT:      Discharge Plan: home with assist and HH OT      Precautions: fall, NWB of RLE, dialysis, sacral wound, elevate residual limb when in bed     Current Status:  ADLs:  Mobility: CGA for slide board to WC  Grooming: IND seated at sink in w/c  Dressing: UB: set up, LB: set up in supine, assist for brace and shoe  Bathing: TBD  Toileting: SB to commode Ax1  IADLs: Pt does most IADL including caregiving for .   Vision/Cognition: intact     Assessment: Pt supine in bed at start of session, reporting incontinence of bowel in brief. Pt requesting to be cleaned up, discussed goal of independence with toileting and encouraged pt to trial changing brief/completing pericares with OT to promote independence. Pt willing to attempt with encouragement but very argumentative throughout with limited flexibility in thinking/resistant to adapting strategies provided by therapist. See session flowsheet for details on patient performance with task.     Other Barriers to Discharge (DME, Family Training, etc): DME, family training, pt has support from multiple family members and will need to schedule family training since she will most likely still require assist with toileting.

## 2024-05-03 NOTE — PLAN OF CARE
Patient alert and oriented. Slept through this shift. Denied pin or discomfort. Patient on HD. No SOB or chest pain voiced. BM X1 this shift. Left PIV patent and flushes well. HD CVC to right chest dressing C/D/I. Mepilex to sacral wound. No care concerns voiced. Call light within reach. Continue with plan of care.

## 2024-05-03 NOTE — PROGRESS NOTES
[facility] Nurse Inpatient Assessment     Consulted for: Coccyx    Patient History (according to provider note(s):      Per Corrina Hurt PA-C on 4/22/2024: Shirley Hendricks is a 63 year old right hand dominant female with complicated past medical history including but not limited to PAD with multiple prior bilateral lower extremity vascular bypass grafts and thrombectomies (most recently 10/2023 right common femoral to proximal anterior tibial PTFE bypass graft), bilateral Charcot-Breonna-Tooth foot deformity, prior bilateral carotid endarterectomies, B-cell lymphoma, CAD (hx Mix3), hypertension, hyperlipidemia, GI bleed (12/2023), type 2 diabetes mellitus, COPD, tobacco use disorder, iron deficiency anemia, GERD, Celiac disease, Takotsubo cardiomyopathy, SVT, depression/anxiety and spongiotic dermatitis who was admitted on 3/29/24 with acute occlusion of right lower extremity arterial bypass graft now s/p right above-knee amputation 4/1/24, completed 4/9/24 with hospital course complicated by acute renal failure now on dialysis, sepsis, acute blood loss anemia, acute post-operative pain, nausea, loose stools, delirium, malnutrition, and multiple electrolyte derangements.  She is now admitted to ARU on 4/22/24 for multidisciplinary rehabilitation and ongoing medical management.        Admission to acute inpatient rehab 04/22/24.      Assessment:      Areas visualized during today's visit: Coccyx, buttocks    Pressure Injury Location: Sacroccocygeal          5/3    Last photo: 5/3/24  Wound type: Pressure Injury, Incontinence Associated Dermatitis (IAD), and Moisture Associated Skin Damage (MASD)     Pressure Injury Stage: either Stage 2 or 3 deferring definitive staging until wound base has evolved, hospital acquired from recent admission at [facility].      Wound history/plan of care:  MASD related to incontinence of bladder and bowels. intergluteal crease with area of linear  "erythema, skin is intact. This line of erythema continues up gluteal crease to sacral area and evolves to a localized round area of erythema that is blanchable. To the center of this area is an open pressure related injury stage 2 vs 3, unable to determine staging at this time, area is positional over bone and within skin crevice, very little adipose tissue to this area. Base of wound with dermis to edges and yellow tissue. Patient found with brief on in bed, discussed indication for brief use, she agreed to remove due to \" I don't even want to use them but I can't get up because of my leg\". Patient has Right AKA, discussed use of bedpan and/or transferring to commode if able, she was agreeable to try this plan.   4/30: pt prefers to have mepilex in place vs paste for when he is down at therapy. Will change orders and re-evaluate at next visit. Spent much time discussing importance of repositioning q2h     Wound base: 100 % blanchable , non-blanchable, erythema, maroon, dermis, and fibrin vs adipose ,   most likely 2- purple tinge tissue possible from chronic tissue damage from moisture     Palpation of the wound bed: normal      Drainage: none     Description of drainage: none     Measurements (length x width x depth, in cm) 1 x 0.5  x  0.1 cm      Periwound skin: Intact and Erythema- blanchable      Color: normal and consistent with surrounding tissue      Temperature: normal   Odor: none  Pain: denies , none  Pain intervention prior to dressing change: patient tolerated well, no significant pain present , soaking, and slow and gentle cares   Treatment goal: Heal , Infection control/prevention, Maintain (prevention of deterioration), Protection, and Promote epidermal migration  STATUS: healing and follow up  Supplies ordered: at bedside, supplies stored on unit, discussed with RN, and discussed with patient     My PI Risk Assessment     Sensory Perception: 4 - No impairment     Moisture: 3 - Occasionally moist     " " Activity: 2 - Chairfast     Mobility: 3 - Slightly limited      Nutrition: 3 - Adequate     Friction/Shear: 1 - Problem     TOTAL: 16      Treatment Plan:     Sacral wound(s): Every 3 days and PRN if soiled  Cleanse the area with NS and pat dry.  Apply No sting film barrier to periwound skin.  Cover wound with Sacral Mepilex (#736754)- fold like a taco with pointed end towards head for best fit  Ensure pt has Ritchie-cushion (#870393) while sitting up in the chair.  Use only one Covidien pad in between mattress and pt.   No brief while in bed.  Add Low air loss pump to isoflex support surface and rotate side to side when in bed  Strict PIP measures  FYI- If pt has constant incontinent loose stools needing dressing changes Q shift please discontinue the Mepilex dressing and apply criticaid barrier paste BID and PRN.      Pressure Injury Prevention (PIP) Plan:  If patient is declining pressure injury prevention interventions: Explore reason why and address patient's concerns, Educate on pressure injury risk and prevention intervention(s), If patient is still declining, document \"informed refusal\" , and Ensure Care team is aware ( provider, charge nurse, etc)  Mattress: Follow bed algorithm, reassess daily and order specialty mattress, if indicated.  HOB: Maintain at or below 30 degrees, unless contraindicated  Repositioning in bed: Every 1-2 hours , Left/right positioning; avoid supine, Raise foot of bed prior to raising head of bed, to reduce patient sliding down (shear), and Frequent microturns using TAPS wedges, as patient tolerates  Heels: Keep elevated off mattress and Pillows under calves  Protective Dressing: Sacral Mepilex for prevention (#810015),  especially for the agitated patient   Positioning Equipment: TAPS wedges (#292322) to help maintain 30 degree side lying position   Chair positioning: Chair cushion (#817352) , Assist patient to reposition hourly, and Do NOT use a donut for sitting (this increases " pressure to smaller area and creates a higher potential for injury)    If patient has a buttock pressure injury, or high risk for PI use chair cushion or SPS.  Moisture Management: Perineal cleansing /protection: Follow Incontinence Protocol, Avoid brief in bed, Clean and dry skin folds with bathing , Consider InterDry (#226360) between folds, and Moisturize dry skin  Under Devices: Inspect skin under all medical devices during skin inspection , Ensure tubes are stabilized without tension, and Ensure patient is not lying on medical devices or equipment when repositioned  Ask provider to discontinue device when no longer needed.      Orders: Reviewed and Updated    RECOMMEND PRIMARY TEAM ORDER: None, at this time  Education provided: importance of repositioning, plan of care, wound progress, Infection prevention , Moisture management, Hygiene, and Off-loading pressure  Discussed plan of care with: Patient and Nurse  WOC nurse follow-up plan: twice weekly  Notify WOC if wound(s) deteriorate.  Nursing to notify the Provider(s) and re-consult the WOC Nurse if new skin concern.    DATA:     Current support surface: Standard  Standard gel/foam mattress (IsoFlex, Atmos air, etc)  Containment of urine/stool: Incontinence Protocol, Brief, Incontinent pad in bed, and recommending NICOLE pump be added to isolflex support surface for moisture management  BMI: Body mass index is 20.84 kg/m .   Active diet order: Orders Placed This Encounter      Gluten Free Diet     Output: No intake/output data recorded.     Labs:   Recent Labs   Lab 05/02/24  0551 05/01/24  0925 04/30/24  0653   ALBUMIN  --   --  2.0*  2.0*   HGB 8.7*   < > 8.8*   WBC 3.6*   < > 3.3*    < > = values in this interval not displayed.     Pressure injury risk assessment:   Sensory Perception: 3-->slightly limited  Moisture: 3-->occasionally moist  Activity: 2-->chairfast  Mobility: 2-->very limited  Nutrition: 2-->probably inadequate  Friction and Shear:  2-->potential problem  Cesar Score: 14      Pager no longer is use, please contact through [employer] group: [facility] Nurse Tracy  Dept. Office Number: [phone]

## 2024-05-03 NOTE — PROGRESS NOTES
Vascular Surgery Progress Note  05/03/2024       Subjective:  Resting comfortably in bed, just returned from PT. Denies pain in RLE.     Objective:  Temp:  [98  F (36.7  C)-98.3  F (36.8  C)] 98.3  F (36.8  C)  Pulse:  [62-70] 62  Resp:  [15-17] 16  BP: (135-156)/(60-63) 156/60  SpO2:  [90 %-94 %] 90 %    No intake/output data recorded.      Gen: Awake, alert, NAD  CV: RRR per radial pulse  Resp: NLB on room air  Abd: soft, nondistended, nontender  Incision: c/d/I, surrounding eschar on the medial portion of the wound and a large eschar on the right anterior thigh over an old hematoma. Skin edges are segmentally approximated with gaps between these segments to allow for drainage.  Ext: WWP, no edema     Labs:  Recent Labs   Lab 05/02/24  0551 05/01/24  0925 04/30/24  0653   WBC 3.6* 3.9* 3.3*   HGB 8.7* 9.3* 8.8*    222 215       Recent Labs   Lab 05/02/24  0551 04/30/24  0653 04/29/24  0615 04/28/24  0039 04/27/24  0722   * 124* 126*  --  127*   POTASSIUM 4.5 4.4 4.4  --  4.5   CHLORIDE 90* 91* 92*  --  93*   CO2 23 23 24  --  25   BUN 25.3* 30.5* 21.0  --  29.0*   CR 3.74* 4.16* 3.20*  --  2.67*   GLC 81 79 79  --  80   SONIA 7.3* 7.6* 7.6*  --  7.7*   MAG 1.5* 1.9 2.0   < > 1.4*   PHOS 3.2 3.8  --   --  2.7    < > = values in this interval not displayed.     Assessment/Plan:   63-year-old female smoker with hypertension, coronary artery disease, COPD, Charcot-Breonna-Tooth disease, prior NSTEMI, multiple previous right leg bypasses, previous aortobifemoral bypass and left femoral-popliteal in situ bypass, who underwent right above-knee amputation and partial explantation of the femoral to anterior tibial artery PTFE bypass graft on 4/9/2024 after her redo right femoral to anterior tibial PTFE bypass graft failed.     Right above-knee amputation wound is assessed today and does not appear grossly infected. Skin edges are segmentally approximated with gaps between these segments to allow for drainage if  needed as seen in previous pictures in the chart. However upon palpation and detailed assessment, no drainage is observed, wound incision is dry without palpable collections. There is some surrounding eschar on the medial portion of the wound and a large eschar on the right anterior thigh over an old hematoma in the old [place]-AT bypass graft tunnel from which the graft was explanted through an anterior thigh incision.      -Continue local wound care with Xeroform, gauze fluffs, and loosely wrapped Kerlix or cast padding  -Do not use Ace wrap  -Continue to use the stockinette available at bedside: has over the shoulder sling to prevent the right AKA dressing from falling off. This can be used when working with therapy to keep this dressing secured  -Change dressing daily, appreciate diligent wound care  -Patient has follow-up with vascular surgery clinic on 5/20/2024.     Discussed with vascular surgery staff, who is in agreement with the above.    Shani Kim, [facility]  Vascular Surgery  Pager: [phone]  [email or web address]  Send message or 10 digit call back number Securely via [employer] with the [employer] Web Console (learn more here)

## 2024-05-04 ENCOUNTER — APPOINTMENT (OUTPATIENT)
Dept: OCCUPATIONAL THERAPY | Facility: CLINIC | Age: 64
DRG: 559 | End: 2024-05-04
Attending: PHYSICAL MEDICINE & REHABILITATION
Payer: COMMERCIAL

## 2024-05-04 ENCOUNTER — APPOINTMENT (OUTPATIENT)
Dept: PHYSICAL THERAPY | Facility: CLINIC | Age: 64
DRG: 559 | End: 2024-05-04
Attending: PHYSICAL MEDICINE & REHABILITATION
Payer: COMMERCIAL

## 2024-05-04 LAB
ANION GAP SERPL CALCULATED.3IONS-SCNC: 10 MMOL/L (ref 7–15)
BACTERIA UR CULT: ABNORMAL
BACTERIA UR CULT: ABNORMAL
BUN SERPL-MCNC: 17.8 MG/DL (ref 8–23)
CALCIUM SERPL-MCNC: 7.4 MG/DL (ref 8.8–10.2)
CHLORIDE SERPL-SCNC: 92 MMOL/L (ref 98–107)
CREAT SERPL-MCNC: 2.98 MG/DL (ref 0.51–0.95)
DEPRECATED HCO3 PLAS-SCNC: 24 MMOL/L (ref 22–29)
EGFRCR SERPLBLD CKD-EPI 2021: 17 ML/MIN/1.73M2
GLUCOSE SERPL-MCNC: 80 MG/DL (ref 70–99)
HOLD SPECIMEN: NORMAL
MAGNESIUM SERPL-MCNC: 1.8 MG/DL (ref 1.7–2.3)
PHOSPHATE SERPL-MCNC: 2.9 MG/DL (ref 2.5–4.5)
POTASSIUM SERPL-SCNC: 4 MMOL/L (ref 3.4–5.3)
SODIUM SERPL-SCNC: 126 MMOL/L (ref 135–145)

## 2024-05-04 PROCEDURE — 99232 SBSQ HOSP IP/OBS MODERATE 35: CPT | Mod: 24 | Performed by: INTERNAL MEDICINE

## 2024-05-04 PROCEDURE — 250N000013 HC RX MED GY IP 250 OP 250 PS 637: Performed by: PHYSICAL MEDICINE & REHABILITATION

## 2024-05-04 PROCEDURE — 250N000013 HC RX MED GY IP 250 OP 250 PS 637: Performed by: PHYSICIAN ASSISTANT

## 2024-05-04 PROCEDURE — 80048 BASIC METABOLIC PNL TOTAL CA: CPT | Performed by: PHYSICIAN ASSISTANT

## 2024-05-04 PROCEDURE — 97535 SELF CARE MNGMENT TRAINING: CPT | Mod: GO

## 2024-05-04 PROCEDURE — 128N000003 HC R&B REHAB

## 2024-05-04 PROCEDURE — 250N000011 HC RX IP 250 OP 636: Performed by: PHYSICIAN ASSISTANT

## 2024-05-04 PROCEDURE — 83735 ASSAY OF MAGNESIUM: CPT | Performed by: PHYSICIAN ASSISTANT

## 2024-05-04 PROCEDURE — 97110 THERAPEUTIC EXERCISES: CPT | Mod: GP

## 2024-05-04 PROCEDURE — 36415 COLL VENOUS BLD VENIPUNCTURE: CPT | Performed by: PHYSICIAN ASSISTANT

## 2024-05-04 PROCEDURE — 90935 HEMODIALYSIS ONE EVALUATION: CPT

## 2024-05-04 PROCEDURE — 634N000001 HC RX 634: Mod: JZ | Performed by: INTERNAL MEDICINE

## 2024-05-04 PROCEDURE — 258N000003 HC RX IP 258 OP 636: Performed by: INTERNAL MEDICINE

## 2024-05-04 PROCEDURE — 97530 THERAPEUTIC ACTIVITIES: CPT | Mod: GP

## 2024-05-04 PROCEDURE — 84100 ASSAY OF PHOSPHORUS: CPT | Performed by: PHYSICIAN ASSISTANT

## 2024-05-04 PROCEDURE — 99232 SBSQ HOSP IP/OBS MODERATE 35: CPT | Performed by: PHYSICAL MEDICINE & REHABILITATION

## 2024-05-04 PROCEDURE — 250N000011 HC RX IP 250 OP 636: Performed by: INTERNAL MEDICINE

## 2024-05-04 RX ORDER — LEVOFLOXACIN 500 MG/1
500 TABLET, FILM COATED ORAL EVERY OTHER DAY
Status: DISCONTINUED | OUTPATIENT
Start: 2024-05-04 | End: 2024-05-04

## 2024-05-04 RX ORDER — HEPARIN SODIUM 1000 [USP'U]/ML
500 INJECTION, SOLUTION INTRAVENOUS; SUBCUTANEOUS CONTINUOUS
Status: DISCONTINUED | OUTPATIENT
Start: 2024-05-04 | End: 2024-05-07

## 2024-05-04 RX ORDER — LEVOFLOXACIN 500 MG/1
500 TABLET, FILM COATED ORAL EVERY OTHER DAY
Qty: 4 TABLET | Refills: 0 | Status: COMPLETED | OUTPATIENT
Start: 2024-05-06 | End: 2024-05-12

## 2024-05-04 RX ADMIN — FAMOTIDINE 20 MG: 20 TABLET ORAL at 17:41

## 2024-05-04 RX ADMIN — MICONAZOLE NITRATE: 20 POWDER TOPICAL at 21:57

## 2024-05-04 RX ADMIN — SENNOSIDES AND DOCUSATE SODIUM 1 TABLET: 8.6; 5 TABLET ORAL at 08:08

## 2024-05-04 RX ADMIN — SODIUM CHLORIDE 300 ML: 9 INJECTION, SOLUTION INTRAVENOUS at 12:00

## 2024-05-04 RX ADMIN — HEPARIN SODIUM 5000 UNITS: 5000 INJECTION, SOLUTION INTRAVENOUS; SUBCUTANEOUS at 21:50

## 2024-05-04 RX ADMIN — CARVEDILOL 6.25 MG: 6.25 TABLET, FILM COATED ORAL at 17:41

## 2024-05-04 RX ADMIN — CLOPIDOGREL BISULFATE 75 MG: 75 TABLET ORAL at 08:08

## 2024-05-04 RX ADMIN — FLUTICASONE FUROATE AND VILANTEROL TRIFENATATE 1 PUFF: 200; 25 POWDER RESPIRATORY (INHALATION) at 10:52

## 2024-05-04 RX ADMIN — HEPARIN SODIUM 500 UNITS/HR: 1000 INJECTION INTRAVENOUS; SUBCUTANEOUS at 12:00

## 2024-05-04 RX ADMIN — GABAPENTIN 200 MG: 100 CAPSULE ORAL at 21:52

## 2024-05-04 RX ADMIN — SODIUM CHLORIDE 250 ML: 9 INJECTION, SOLUTION INTRAVENOUS at 12:00

## 2024-05-04 RX ADMIN — SENNOSIDES AND DOCUSATE SODIUM 1 TABLET: 8.6; 5 TABLET ORAL at 22:02

## 2024-05-04 RX ADMIN — HEPARIN SODIUM 500 UNITS: 1000 INJECTION INTRAVENOUS; SUBCUTANEOUS at 12:00

## 2024-05-04 RX ADMIN — Medication 1 CAPSULE: at 08:08

## 2024-05-04 RX ADMIN — AMLODIPINE BESYLATE 5 MG: 5 TABLET ORAL at 08:08

## 2024-05-04 RX ADMIN — SILVER SULFADIAZINE: 10 CREAM TOPICAL at 17:43

## 2024-05-04 RX ADMIN — Medication 1 CAPSULE: at 21:52

## 2024-05-04 RX ADMIN — LEVOFLOXACIN 750 MG: 500 TABLET, FILM COATED ORAL at 21:51

## 2024-05-04 RX ADMIN — ROSUVASTATIN CALCIUM 10 MG: 5 TABLET, COATED ORAL at 21:52

## 2024-05-04 RX ADMIN — HEPARIN SODIUM 5000 UNITS: 5000 INJECTION, SOLUTION INTRAVENOUS; SUBCUTANEOUS at 08:09

## 2024-05-04 RX ADMIN — EPOETIN ALFA-EPBX 10000 UNITS: 10000 INJECTION, SOLUTION INTRAVENOUS; SUBCUTANEOUS at 14:06

## 2024-05-04 RX ADMIN — ONDANSETRON 4 MG: 4 TABLET, ORALLY DISINTEGRATING ORAL at 22:29

## 2024-05-04 RX ADMIN — CETIRIZINE HYDROCHLORIDE 5 MG: 5 TABLET ORAL at 08:08

## 2024-05-04 RX ADMIN — NICOTINE 1 PATCH: 14 PATCH, EXTENDED RELEASE TRANSDERMAL at 08:13

## 2024-05-04 RX ADMIN — CARVEDILOL 6.25 MG: 6.25 TABLET, FILM COATED ORAL at 08:08

## 2024-05-04 RX ADMIN — HYDROMORPHONE HYDROCHLORIDE 2 MG: 2 TABLET ORAL at 10:52

## 2024-05-04 RX ADMIN — HYDROMORPHONE HYDROCHLORIDE 2 MG: 2 TABLET ORAL at 21:51

## 2024-05-04 ASSESSMENT — ACTIVITIES OF DAILY LIVING (ADL)
ADLS_ACUITY_SCORE: 39

## 2024-05-04 NOTE — PROGRESS NOTES
"HEMODIALYSIS TREATMENT NOTE    Date: 5/4/2024  Time: 2:46 PM    Data:  Pre Wt: 52.3 kg (115 lb 4.8 oz)   Desired Wt:52.3   kg   Post Wt: 52.3 kg (115 lb 4.8 oz)  Weight change: 0 kg  Ultrafiltration - Post Run Net Total Removed (mL): 0 mL  Vascular Access Status: CVC  patent  Dialyzer Rinse: Clear  Total Blood Volume Processed: 69.01 L   Total Dialysis (Treatment) Time: 3   Dialysate Bath: K 3, Ca 3  Heparin 500 units loading + 500 units/hr    Lab:   No    Interventions:  LIMA pt. No fluid was pulled per order. Got Epogen per order. CVC dressing was changed per her request with no unusual findings noted. Pt rinsed back post tx, lumen were saline locked, end caps changed, and hand off report was given to MARISSA Stafford (Antoinette).   Assessment:  -Calm & Cooperative  -Hypertensive but asymptomatic. Writer told MARISSA Stafford  to tell PCN to administer due coreg once pt is back under her cares, and she said, \"ok.\"  -A & O X 4                Plan:    Per renal     "

## 2024-05-04 NOTE — PLAN OF CARE
Discharge Plan: home with HH PT      Precautions: fall, NWB of RLE, sacral wound, elevate RLE in bed    Current Status:  Bed Mobility: [place]aira   Transfer: Slideboard SBA bed<>w/c   Gait: unable, unsafe  Stairs: not tested  Balance: Stable dynamic sitting.     Assessment: tolerated AKA exercise;  slide board and progressive partial sit to stand training at hallway rail this am.      2nd am session pt with 8/10 headache; PT addressed posture exercise and self soft tissue mob to improve this barrier 2/10 post.  Limited tolerance this session due to headache.    Other Barriers to Discharge (DME, Family Training, etc):   DME: K3 w/c (order faxed 4/30), slideboard, bedrail.     Family training: TBD. Car tx, bumping up 3STE in w/c.

## 2024-05-04 NOTE — PLAN OF CARE
Goal Outcome Evaluation:      Plan of Care Reviewed With: patient    Overall Patient Progress: no changeOverall Patient Progress: no change     Pt Mg 1.5 this shift. IV Mg infusing at this time via PIV. Pt is alert and oriented x4 Able to make needs known. Denied having discomfort this shift. Medication taken whole with thin liquid. No acute issue at this time Will continue with POC

## 2024-05-04 NOTE — PLAN OF CARE
Discharge Planner Post-Acute Rehab OT:      Discharge Plan: home with assist and HH OT      Precautions: fall, NWB of RLE, dialysis, sacral wound, elevate residual limb when in bed     Current Status:  ADLs:  Mobility: CGA for slide board to WC  Grooming: IND seated at sink in w/c  Dressing: UB: set up, LB: set up in supine, assist for brace and shoe  Bathing: TBD  Toileting: SB to commode Ax1  IADLs: Pt does most IADL including caregiving for .   Vision/Cognition: intact     Assessment: Focus of OT is commode with SB and managing pants, pt con't to need assist of one with toileting, it is effortful for pt and she has bowel urgency. See doc flowsheet for specifics and con't OT POC.      Other Barriers to Discharge (DME, Family Training, etc): DME, family training, pt has support from multiple family members and will need to schedule family training since she will most likely still require assist with toileting.      5/4 Pt states her H is staying with his sister and she hopes this will con't.  She reports her brother will help her but she would like to be IND with toileting as he is not likely to assist her with toileting.

## 2024-05-04 NOTE — PLAN OF CARE
Goal Outcome Evaluation:  Overall Patient Progress: no changeOverall Patient Progress: no change    Patient is alert and oriented x4. Calls for needs. Mod I from bed<>w/c with left shoe brace on. Not oob this shift. Received with IV Mg replacement, Recheck 4 hours after at 0230. Mg is 1.8 , normal. L piv saline locked. Continent B&B though on hemodialysis. No expressed needs at this time. Sleeping all shift w/o complaints. Continue poc.

## 2024-05-04 NOTE — PROGRESS NOTES
Nephrology Progress Note  05/04/2024         Assessment & Recommendations:   Shirley Hendricks is a 63 year old year old with peripheral artery disease, coronary artery disease and history of MI, hypertension, diabetes mellitus type 2 who is admitted to ARU following hospitalization for occlusion of right LE bypass graft managed with right above-the-knee amputation and complication of acute kidney injury requiring dialysis.     #Acute kidney injury: multifactorial (use of IV contrast 3 days in a row for evaluation and management of her occluded bypass graft, ischemia and possibly rhabdomyolysis) Creatinine level was 0.7 mg/dL prior to this episode. No urine studies available prior to LIMA to assess baseline proteinuria.  -First dialysis run 4/3/2024.  -Access tunneled CVC initially placed 4/3/2024 and revised on 4/15/2024.  - Pursue dialysis Tuesday, Thursday, Saturday for now but continue to watch closely for recovery  - HD today but no UF as she has had poor intake-   - had been receiving hemodialysis at [facility] and consents to ongoing hemodialysis while at [facility].     #Hyponatremia: slightly improved to 126 This is due to fluid intake in the absence of significant renal function.  - 1 L daily fluid restriction and should also improve with dialysis    #Blood pressure remains uncontrolled on carvedilol 6.25 mg twice daily. Please start amlodipine 5 mg daily and bumetanide 4 mg daily  #PAD/PVD  #Right common femoral artery to the mid anterior tibial artery bypass graft acute occlusion  #Right above-the-knee amputation 4/9/2024  - Plavix 75 mg daily (previously on Xarelto but no ongoing indication)     #Anemia due to acute blood loss with retroperitoneal hematoma and history of GI bleeding in December 2023.  - hgb at 8.7 on 5/02  - retacrit 10K units three times weekly with HD  -Received iron sucrose on 4/30     #Coronary artery disease with history of myocardial infarction x 3  #History of Takotsubo  "cardiomyopathy  PTA carvedilol and lisinopril, lisinopril has been on hold.  PTA rosuvastatin was initially held due to rhabdo but resumed.  PTA empagliflozin 10 mg daily has been discontinued given acute kidney injury     #Diabetes mellitus type 2  - Not requiring medications at this time     #Stage Mark marginal zone B-cell lymphoma  - Undergoing surveillance with [place] [facility]     Recommendations were communicated to primary team via note    Lissette Phelps MD   Division of Renal Disease and Hypertension  [facility]  Vocera Web Console    Interval History :   Nursing and provider notes from last 24 hours reviewed.  In the last 24 hours Shirley is doing OK, has had emesis in the morning.      Review of Systems:   I reviewed the following systems:  GI: nausea/ vomiting  Neuro:  no confusion  Constitutional:  no fever or chills  CV: no dyspnea or edema.  no chest pain.    Physical Exam:   No intake/output data recorded.   BP (!) 154/70   Pulse 60   Temp 98.7  F (37.1  C) (Oral)   Resp 22   Ht 1.549 m (5' 0.98\")   Wt 52.3 kg (115 lb 4.8 oz)   LMP  (LMP Unknown)   SpO2 92%   BMI 21.80 kg/m       GENERAL APPEARANCE: no distress  EYES:  no scleral icterus, pupils equal  Mouth: dry mucosa  PULM: normal work of breathing  CV: 1+ left foot edema  INTEGUMENT: no cyanosis, no rash  NEURO:  speech fluent, face symmetric    Labs:   All labs reviewed by me  Electrolytes/Renal -   Recent Labs   Lab Test 05/04/24  0249 05/02/24  0551 04/30/24  0653   * 122* 124*   POTASSIUM 4.0 4.5 4.4   CHLORIDE 92* 90* 91*   CO2 24 23 23   BUN 17.8 25.3* 30.5*   CR 2.98* 3.74* 4.16*   GLC 80 81 79   SONIA 7.4* 7.3* 7.6*   MAG 1.8 1.5* 1.9   PHOS 2.9 3.2 3.8       CBC -   Recent Labs   Lab Test 05/02/24  0551 05/01/24  0925 04/30/24  0653   WBC 3.6* 3.9* 3.3*   HGB 8.7* 9.3* 8.8*    222 215       LFTs -   Recent Labs   Lab Test 04/30/24  0653 04/23/24  0602 04/17/24  0636 04/15/24  0536 04/09/24  0636   ALKPHOS 100 96  --   --  " 155*   BILITOTAL 0.4 0.5  --   --  0.9   ALT 24 20  --   --  53*   AST 21 16  --   --  68*   PROTTOTAL 4.3* 4.1*  --   --  3.9*   ALBUMIN 2.0*  2.0* 1.9* 2.3*   < > 1.8*    < > = values in this interval not displayed.       Iron Panel -   Recent Labs   Lab Test 04/10/24  0559 04/09/24  1817 01/08/24  1542 12/11/23  0550   IRON 22*  --  71 122   IRONSAT 18  --  17 32   BRYN  --  609* 25 23       Current Medications:  Current Facility-Administered Medications   Medication Dose Route Frequency Provider Last Rate Last Admin    amLODIPine (NORVASC) tablet 5 mg  5 mg Oral Daily Corrina Hurt PA-C   5 mg at 05/04/24 0808    carvedilol (COREG) tablet 6.25 mg  6.25 mg Oral BID w/meals Corrina Hurt PA-C   6.25 mg at 05/04/24 0808    cetirizine (zyrTEC) tablet 5 mg  5 mg Oral Daily Corrina Hurt PA-C   5 mg at 05/04/24 0808    clopidogrel (PLAVIX) tablet 75 mg  75 mg Oral Daily Corrina Hurt PA-C   75 mg at 05/04/24 0808    epoetin mireya-epbx (RETACRIT) injection 10,000 Units  10,000 Units Intravenous Once in dialysis/CRRT Soo Mejia MD        famotidine (PEPCID) tablet 20 mg  20 mg Oral Q48H Corrina Hurt PA-C   20 mg at 05/02/24 1606    fluticasone-vilanterol (BREO ELLIPTA) 200-25 MCG/ACT inhaler 1 puff  1 puff Inhalation Daily Corrina Hurt PA-C   1 puff at 05/04/24 1052    gabapentin (NEURONTIN) capsule 200 mg  200 mg Oral Once per day on Tuesday Thursday Saturday Corrina Hurt PA-C   200 mg at 05/02/24 2029    heparin (porcine) injection  500 Units Hemodialysis Machine OR IV Push Once in dialysis/CRRT Soo Mejia MD        heparin ANTICOAGULANT injection 5,000 Units  5,000 Units Subcutaneous Q12H Corrina Hurt PA-C   5,000 Units at 05/04/24 0809    lactobacillus rhamnosus (GG) (CULTURELL) capsule 1 capsule  1 capsule Oral BID Corrina Hurt PA-C   1 capsule at 05/04/24 0808    miconazole (MICATIN) 2 % powder   Topical BID Blu  Corrina PAIZ PA-C   Given at 05/03/24 2042    multivitamin RENAL (TRIPHROCAPS) capsule 1 capsule  1 capsule Oral Daily Corrina Hurt PA-C   1 capsule at 05/04/24 0808    nicotine (NICODERM CQ) 14 MG/24HR 24 hr patch 1 patch  1 patch Transdermal Daily Corrina Hurt PA-C   1 patch at 05/04/24 0813    rosuvastatin (CRESTOR) tablet 10 mg  10 mg Oral At Bedtime Corrina Hurt PA-C   10 mg at 05/03/24 2030    senna-docusate (SENOKOT-S/PERICOLACE) 8.6-50 MG per tablet 1-2 tablet  1-2 tablet Oral BID Corrina Hurt PA-C   1 tablet at 05/04/24 0808    silver sulfADIAZINE (SILVADENE) 1 % cream   Topical Daily Gala Lo MD   Given at 05/03/24 1339    sodium chloride (PF) 0.9% PF flush 9 mL  9 mL Intracatheter During Dialysis/CRRT (from stock) Soo Mejia MD        sodium chloride (PF) 0.9% PF flush 9 mL  9 mL Intracatheter During Dialysis/CRRT (from stock) Soo Mejia MD        sodium chloride 0.9% BOLUS 250 mL  250 mL Intravenous Once in dialysis/CRRT Soo Mejia MD        sodium chloride 0.9% BOLUS 300 mL  300 mL Hemodialysis Machine Once Soo Mejia MD         Current Facility-Administered Medications   Medication Dose Route Frequency Provider Last Rate Last Admin    heparin 10,000 units/10 mL infusion (DIALYSIS USE)  500 Units/hr Hemodialysis Machine Continuous Soo Mejia MD Sabine Karam, MD

## 2024-05-04 NOTE — PROGRESS NOTES
Called by RN re: low magnesium 1.5 from lab result 5/2/24  D/W pharmacy ([id number]):   Advised magnesium IV now and recheck Magnesium and creatinine.    Informed RN and order placed. Will check Magnesium and creatinine.

## 2024-05-04 NOTE — PROGRESS NOTES
[facility]   Acute Rehabilitation Unit  Daily progress note    INTERVAL HISTORY  Shirley Hendricks was seen at bedside this morning.  No acute events reported overnight.      With therapies, PT notes she was upgraded to Jose bed<>w/c with slideboard. Remains Siddharth for commode transfers. Progressing with w/c endurance.     MEDICATIONS  Current Facility-Administered Medications   Medication Dose Route Frequency Provider Last Rate Last Admin    amLODIPine (NORVASC) tablet 5 mg  5 mg Oral Daily Corrina Hurt PA-C   5 mg at 05/04/24 0808    carvedilol (COREG) tablet 6.25 mg  6.25 mg Oral BID w/meals Corrina Hurt PA-C   6.25 mg at 05/04/24 0808    cetirizine (zyrTEC) tablet 5 mg  5 mg Oral Daily Corrina Hurt PA-C   5 mg at 05/04/24 0808    clopidogrel (PLAVIX) tablet 75 mg  75 mg Oral Daily Corrina Hurt PA-C   75 mg at 05/04/24 0808    famotidine (PEPCID) tablet 20 mg  20 mg Oral Q48H Corrina Hurt PA-C   20 mg at 05/02/24 1606    fluticasone-vilanterol (BREO ELLIPTA) 200-25 MCG/ACT inhaler 1 puff  1 puff Inhalation Daily Corrina Hurt PA-C   1 puff at 05/03/24 1030    gabapentin (NEURONTIN) capsule 200 mg  200 mg Oral Once per day on Tuesday Thursday Saturday Corrina Hurt PA-C   200 mg at 05/02/24 2029    heparin ANTICOAGULANT injection 5,000 Units  5,000 Units Subcutaneous Q12H Corrina Hurt PA-C   5,000 Units at 05/04/24 0809    lactobacillus rhamnosus (GG) (CULTURELL) capsule 1 capsule  1 capsule Oral BID Corrina Hurt PA-C   1 capsule at 05/04/24 0808    miconazole (MICATIN) 2 % powder   Topical BID Corrina Hurt PA-C   Given at 05/03/24 2042    multivitamin RENAL (TRIPHROCAPS) capsule 1 capsule  1 capsule Oral Daily Corrina Hurt PA-C   1 capsule at 05/04/24 0808    nicotine (NICODERM CQ) 14 MG/24HR 24 hr patch 1 patch  1 patch Transdermal Daily Corrina Hurt PA-C   1 patch at 05/04/24  "0813    rosuvastatin (CRESTOR) tablet 10 mg  10 mg Oral At Bedtime Corrina Hurt PA-C   10 mg at 05/03/24 2030    senna-docusate (SENOKOT-S/PERICOLACE) 8.6-50 MG per tablet 1-2 tablet  1-2 tablet Oral BID Corrina Hurt PA-C   1 tablet at 05/04/24 0808    silver sulfADIAZINE (SILVADENE) 1 % cream   Topical Daily Gala Lo MD   Given at 05/03/24 1339          Current Facility-Administered Medications   Medication Dose Route Frequency Provider Last Rate Last Admin    acetaminophen (TYLENOL) tablet 500-1,000 mg  500-1,000 mg Oral Q6H PRN Corrina Hurt PA-C   1,000 mg at 05/01/24 1441    [Held by provider] diphenoxylate-atropine (LOMOTIL) 2.5-0.025 MG per tablet 1 tablet  1 tablet Oral 4x Daily PRN Corrina Hurt PA-C   1 tablet at 04/30/24 1538    HYDROmorphone (DILAUDID) tablet 2 mg  2 mg Oral Q6H PRN Corrina Hurt PA-C   2 mg at 05/03/24 1352    naloxone (NARCAN) injection 0.2 mg  0.2 mg Intravenous Q2 Min PRN Arun Pimentel MD        Or    naloxone (NARCAN) injection 0.4 mg  0.4 mg Intravenous Q2 Min PRN Arun Pimentel MD        Or    naloxone (NARCAN) injection 0.2 mg  0.2 mg Intramuscular Q2 Min PRN Arun Pimentel MD        Or    naloxone (NARCAN) injection 0.4 mg  0.4 mg Intramuscular Q2 Min PRN Arun Pimentel MD        nitroGLYcerin (NITROSTAT) sublingual tablet 0.4 mg  0.4 mg Sublingual Q5 Min PRN Corrina Hurt PA-C        ondansetron (ZOFRAN ODT) ODT tab 4 mg  4 mg Oral Q6H PRN Corrina Hurt PA-C   4 mg at 05/02/24 0849    polyethylene glycol (MIRALAX) powder 17 g  17 g Oral Daily PRN Corrina Hurt PA-C            PHYSICAL EXAM  BP (!) 156/61 (BP Location: Left arm)   Pulse 65   Temp 98.6  F (37  C) (Oral)   Resp 16   Ht 1.549 m (5' 0.98\")   Wt 50 kg (110 lb 3.7 oz)   LMP  (LMP Unknown)   SpO2 (!) 88%   BMI 20.84 kg/m    Gen: NAD, up in chair  HEENT: NC/AT, MMM  Cardio: RRR, no murmurs, +dialysis catheter R chest  Pulm: non-labored on room air, lungs " CTA bilaterally  Abd: soft, tender in RLE, today with no rebound/guarding, non-distended, +hyperactive bowel sounds  Ext: some edema in RLE; charcot foot deformity on left; R AKA site not visualized today as vascular surgery coming to assess  Neuro/MSK: awake, alert, moving all extremities actively in bed      LABS  CBC RESULTS:   Recent Labs   Lab Test 05/02/24  0551 05/01/24  0925 04/30/24  0653   WBC 3.6* 3.9* 3.3*   RBC 2.80* 3.05* 2.88*   HGB 8.7* 9.3* 8.8*   HCT 26.9* 29.5* 27.2*   MCV 96 97 94   MCH 31.1 30.5 30.6   MCHC 32.3 31.5 32.4   RDW 18.4* 19.0* 18.4*    222 215       Last Basic Metabolic Panel:  Recent Labs   Lab Test 05/04/24  0249 05/02/24  0551 04/30/24  0653   * 122* 124*   POTASSIUM 4.0 4.5 4.4   CHLORIDE 92* 90* 91*   CO2 24 23 23   ANIONGAP 10 9 10   GLC 80 81 79   BUN 17.8 25.3* 30.5*   CR 2.98* 3.74* 4.16*   GFRESTIMATED 17* 13* 11*   SONIA 7.4* 7.3* 7.6*         Rehabilitation - continue comprehensive acute inpatient rehabilitation program with multidisciplinary approach including therapies, rehab nursing, and physiatry following. See interval history for updates.      ASSESSMENT AND PLAN  Shirley Hendricks is a 63 year old right hand dominant female with complicated past medical history including but not limited to PAD with multiple prior bilateral lower extremity vascular bypass grafts and thrombectomies (most recently 10/2023 right common femoral to proximal anterior tibial PTFE bypass graft), bilateral Charcot-Breonna-Tooth foot deformity, prior bilateral carotid endarterectomies, B-cell lymphoma, CAD (hx Mix3), hypertension, hyperlipidemia, GI bleed (12/2023), type 2 diabetes mellitus, COPD, tobacco use disorder, iron deficiency anemia, GERD, Celiac disease, Takotsubo cardiomyopathy, SVT, depression/anxiety and spongiotic dermatitis who was admitted on 3/29/24 with acute occlusion of right lower extremity arterial bypass graft now s/p right above-knee amputation 4/1/24,  completed 4/9/24 with hospital course complicated by acute renal failure now on dialysis, sepsis, acute blood loss anemia, acute post-operative pain, nausea, loose stools, delirium, malnutrition, and multiple electrolyte derangements.  She is now admitted to ARU on 4/22/24 for multidisciplinary rehabilitation and ongoing medical management.        Admission to acute inpatient rehab 04/22/24.    Impairment group code: Amputation 05.3 Unilateral LE AKA; emergent R AKA due to acute occlusion of RLE bypass graft         PT and OT 90 minutes of each daily for 6 days per week in addition to rehab nursing and close management of physiatrist.       Impairment of ADL's: Noted to have impaired activity tolerance, impaired balance, impaired strength, impaired weight shifting, and pain, all affecting her ability to safely and independently perform basic ADLs.  Goal for mod I with basic wheelchair-based ADLs with assist for bathing, as well as assist IADLs/heavier activities.     Impairment of mobility:  Noted to have impaired activity tolerance, impaired balance, impaired strength, impaired weight shifting, and pain, all affecting her ability to safely and independently perform basic mobility.  Goal for mod I with basic wheelchair-based mobility.     Impairment of cognition/language/swallow:  Noted to have dysphagia with goals for safe tolerance of least restrictive diet.  However, IP SLP noting did not anticipate further SLP services at ARU, no issues with tolerating regular diet.  Will not consult initially.  If any concern for difficulty tolerating current diet, can consult.     Medical Conditions  New actions/orders/updates for today are in blue.     S/p right AKA 4/1/24, completed 4/9/24 due to critical limb ischemia, acute occlusion of right common femoral artery to mid anterior tibial artery bypass graft   PAD/PVD with hx multiple prior vascular interventions to BLE  Bilateral Charcot-Breonna-Tooth foot deformities  Acute  post-op pain  - Wound care: daily dressing changes to right AKA with xeroform and gauze with kerlix to keep wound protected  - Dehiscence of lateral surgical incision with some increased serosanguinous drainage.  Also with multiple areas with necrotic appearance.  Overall similar to day of ARU admission although with more drainage.  Vascular surgery consulted/assessed on 4/28.  Appreciate assist.  Recommended ongoing daily dressing changes with Xeroform, gauze fluffs, loosely wrapped Kerlix.  DO NOT use ace wrap.  Stockinette can be placed into over-the-shoulder sling to prevent right AKA dressing from falling off during therapies.  Silvadene topical also added per vascular surgery on 4/29, to be applied daily to incision.  Some increased erythema noted at surgical site on 5/2.  Vascular surgery reassessed today and felt overall appearance not concerning.  Will likely eventually need debridement of anterior thigh but that will be deferred/evaluated at OP follow-up with Dr. Hernandez.  No indication for antibiotics at this time.  Continue current plan of care.  - Also with significant itching at surgical site.  Recently seen by derm for dermatitis as below and recommended to trial zyrtec or claritin for itching.  Started zyrtec 5 mg daily on 4/25.  - NWB RLE  - Continue PTA L foot custom orthotic   - Continue Plavix 75 mg daily (given hx left bypass).  No ongoing need for Xarelto per vascular (no recent DVT or PE)  - Pain management: APAP 500-1000 mg q6h PRN, dilaudid 2 mg q6h PRN, gabapentin 200 mg 3x/week after HD (renally dosed).  Wean opioids as able.   - Continue PT/OT  - Follow up with vascular surgery in 2-4 weeks (~5/28)     Acute blood loss anemia on anemia of chronic disease, hx iron deficiency  Retroperitoneal hematoma  Recent GI bleed (hospitalized 12/2023)  Required intermittent transfusion during this admission (3/30, 3/31, 4/2, 4/6, 4/9, 4/13).  Repeat CT abdomen on 4/20 with stable right retroperitoneal  hematoma; no s/o active bleed.  5/2: Hgb stable at 8.7  - Repeat CT abd/pelvis on 5/2 demonstrated decreased small right retroperitoneal hematoma (iatrogenic)  - Continue epo/venofer with HD per nephrology  - Trend CBC every T/Th/Sat  - Transfuse for Hgb <7     Acute renal failure on HD  Hyponatremia  Normal baseline Cr, though per nephrology, suspect some modest CKD.  LIMA this admission, up to peak Cr 4/22 on 4/3.  Likely BAILEY +/- rhabdo +/- ischemic injury with no signs of recovery and now dialysis dependent (started 4/3/24).  S/p tunneled dialysis catheter placement 4/3/24.  5/2: Cr 3.74; Na slightly lower at 122.    - Nephrology consulted, appreciate ongoing assistance  - HD T/Th/Sat at ARU or per nephrology recs.  Plan for OP HD at [facility] T/Th/Sat  - S/p diuretic challenge (Lasix 100 mg PO) on 4/29 per nephrology without improved output.  Per nephrology, planning for repeat diuretic challenge this weekend.   Evidence of fluid overload on CT abd/pelv 5/2 with mod pleural effusions, small volume ascites, diffuse soft tissue anasarca.  - Trend BMP/mag/phos on HD days  - Continue 1L fluid restriction  - Avoid NSAIDs/nephrotoxins, renally dose medications     Bilateral pleural effusion  Noted on CT abdomen 4/20 with moderate b/l pleural effusion (left>right).  Has been intermittently requiring 1-2L supplemental oxygen.  Unclear if related to volume given new renal failure on HD, also baseline COPD.  - Repeat CT abd/pelv on 5/2 with moderate pleural effusions  - Monitor respiratory status, supplemental O2 by NC PRN to maintain sats >88%  - Consider thoracentesis if symptomatic or worsening hypoxia      Celiac disease  Colon distension   Loose stools, improving  Nausea/vomiting, improving  Severe malnutrition in context of acute on chronic illness  Pill cam study with [facility] in 3/2024 (due to recent GI bleed), which revealed mild non-erosive red tissue in the duodenum, some atrophic type appearance that could  "be celiac disease.  This was followed by celiac labs on 3/20/24 (Gliadin ab and tTG IgA antibody) which were positive and thus Celiac diagnosis was established and she was recommended for gluten-free diet.  This admission, noted to have colonic distension and circumferential wall thickening of the distal colon and rectum concerning for proctocolitis on CT abdomen.  Also with loose stools, nausea.  Despite this, patient declined gluten-free diet (switched on 4/18).  Noted to have improvement in loose stools and nausea at discharge to ARU per sending team.  However, has persisted with loose stools, intermittent nausea and vomiting.  Repeat cdiff on 4/28 negative.  Suspect symptoms are due to Celiac disease and non-compliance with gluten-free diet.  Patient agreeable to trial of gluten-free diet started on 4/28.  Due to recurrent nausea/emesis, ongoing loose stools, + new rebound/guarding in right abdomen, CT abdomen/pelvis obtained on 5/2 which showed \"persistent wall thickening of the descending and sigmoid colon in addition to the rectum, similar to prior study. This may also be due  to overall third spacing of fluid versus colitis given diarrhea history. Large volume of stool.\"  Loose stools could represent obstructive diarrhea.    - Continue gluten-free diet  - RD consulted, appreciate assistance  - Discontinue scopolamine patch.  Patient does not feel this was helpful for nausea/vomiting and could contribute to slow colonic transit.  - Hold Lomotil  - Suspect that stool burden currently too proximal for suppository or enema to be effective.  Discussed options for PO meds with pharmacy.  Given hyponatremia, would advise against lactulose.  Will give Miralax x1 and start on senokot-S 1-2 tabs BID scheduled.  Could consider enema in 1-2 days.  May need to reassess with repeat AXR to monitor progress and determine appropriate timing to reduce meds.  - Given ongoing nausea/vomiting and abdominal/pelvic pain, as well " as bladder wall thickening on CT, UA obtained to rule out UTI.   Results with large proteinuria, negative nitrites, small hematuria, large leuk esterase, many bacteria.  Given low UOP in setting of HD and not clear UTI symptoms, will await culture results before considering treatment.      Urine culture came back 5/4: started Levoflox per renal dose    - PRN Zofran  - Continue probiotics BID  - Monitor symptoms  - Follow up with [facility] as outpatient    Hypomagnesemia  Replaced mag IV on 4/23 with improvement to 2.1 and again on 4/27.  5/2: Mag slightly low at 1.5, not requiring replacement  - Continue to trend     Hypocalcemia  4/30: Ca corrects to 8.8 (low end of normal) for hypoalbuminemia  - Per pharmacy, was on PTA calcium carbonate/vit D PTA (had run out 1 week before admission), not ordered while at hospital.  If corrected Ca remains low, reach out to nephrology to consider if resumption indicated  - Continue to trend    CAD (hx MI x3)  HTN  Hyperlipidemia  Hx Takotsubo CM  Hx SVT  Last coronary angiogram completed 10/2023.  Recovered EF (55-60%) from ECHO 11/2023.  - Continue PTA Coreg 6.25 mg BID, amlodipine 5 mg daily  - Hold PTA lisinopril 10 mg daily due to renal failure  - PTA Jardiance 10 mg daily discontinued due to renal failure  - Rosuvastatin 10 mg daily resumed on ARU admission (previously held due to concern for rhabdo).  Monitor hepatic panel in 1 week    - Monitor BP  - Was to follow up with cardiology in Feb 2024, should be scheduled after discharge    Murmur  Noted on exam this admission by attending physiatrist.  Per chart review, intermittently documented in the past with questionable/subtle murmur (though not by cardiology).  Most recent echo 11/7/23 with trace mitral regurg, trace tricuspid regurg, and moderate trileaflet aortic sclerosis.  - Folllow up with cardiology as above     Hx bilateral carotid artery stenosis s/p bilateral carotid endarterectomies  - Follow up with vascular surgery  for annual carotid duplex (last done on 1/4/24 with stable stenosis of right carotid bulb)     Diabetes mellitus, type II  Per chart review.  A1c this admission 5.2%.  PTA Jardiance and gabapentin discontinued due to acute renal failure.  BG well-controlled during this admission without need for insulin.  - Monitor BG periodically with labs     B-cell lymphoma, stage VALENTIN  Followed by [facility] (Dr. Barrow).  Mild radiographic progression on CT 1/26/24.  Given asymptomatic, plans at that time for ongoing CT monitoring.  - Monitor for development of any B symptoms  - Trend CBC  - Follow up with oncology, repeat CT as planned in 7/2024     COPD  Tobacco use disorder  PTA smoking ~5 cigarettes per day  - Monitor respiratory status, supplemental O2 by NC PRN to maintain sats >88%  - Continue PTA Breo Ellipta  - Nicotine patch 14 mcg/day  - Ongoing education/resources for cessation     Adjustment disorder with mixed mood  Hx MDD/anxiety (per chart though patient denies)  Delirium, resolved  Not on medications PTA.  Patient denies any hx depression/anxiety but does note depressed mood in setting of recent AKA and significant home stressors.  - Psychology and spiritual services consulted, appreciate assist  - Monitor mood     GERD  PTA on omeprazole 20 mg daily  - Continue pepcid 20 mg q48 hours (renally dosed) given allergy to pantoprazole (formulary sub for omeprazole)     Spongiotic dermatitis  Recently seen by OP dermatology, recommended betamethasone cream BID PRN, not using regularly at hospital  - Consider resumption of topical steroids if recurrent symptoms  - Started Zyrtec 4/25 as above for itching near surgical site     Sacral wound: pressure injury (stage 2 vs 3), incontinence-associated dermatitis, moisture-associated skin damage  Assessed by [facility]OCN on 4/22 at [facility].  - WOCN consulted for ongoing follow-up at ARU  - Wound care per WOCN orders  - Pressure reduction strategies    Endometrial  thickening  Noted incidentally on CT abdomen/pelvis.  Patient denies postmenopausal bleeding.  - PCP to follow-up, consider outpatient pelvic ultrasound to better assess     Adjustment to disability:  Clinical psychology to eval and treat if indicated  FEN: gluten free diet, 1000 mL fluid restriction  Bowel: incontinent, recent loose stools as above  Bladder: incontinent  DVT Prophylaxis: subcutaneous heparin  GI Prophylaxis: pepcid  Code: full, confirmed on admission  Disposition: goal for home  ELOS:  target 5/13/24  Follow up Appointments on Discharge: PCP in 1-2 weeks, vascular surgery in 2 weeks, [facility], nephrology (ongoing HD), heme/onc ([facility], 7/2024)      30 minutes spent on the date of service doing chart review, history and exam, documentation, and further activities as noted above.

## 2024-05-04 NOTE — PLAN OF CARE
Goal Outcome Evaluation:      Plan of Care Reviewed With: patient    Overall Patient Progress: no change    Outcome Evaluation: Pt. alert oriented x4, calls appropriately. Cooperates with cares and therapy. Dressing to stump and coccyx changed. CHG bath done. Helen slide board bed to wheelchair and vice versa. On Regular thin 1 L fluid restriction with fair appetite. C/o of leg pain PRN Dilaudid given was effective. Magnesium WNL recheck alex. AM.  Had hemodialysis today did not pull fluid per report. Pt. BP high scheduled medication given, BP still high,patiet denies lightheadedness, pain, discomfort after dialysis. Safety measures in place. Bed alarm on. Call light within reach. Will continue to monitor the pt.

## 2024-05-04 NOTE — PLAN OF CARE
Goal Outcome Evaluation:      Plan of Care Reviewed With: patient    Overall Patient Progress: no change    Outcome Evaluation: Pt. alert oriented x4, calls appropriately. Cooperates with cares and therapy. Dressing to stump and coccyx changed. CHG bath done. Helen slide board bed to wheelchair and vice versa. On Regular thin 1 L fluid restriction with fair appetite. C/o of leg pain PRN Dilaudid given was effective. Urine specimen sent, results MD informed will treat the pt. once UC available.Magnesium on 5/2/24 is  1.5 referred to MD. Safety measures in place. Bed alarm on. Call light within reach. Will continue to monitor the pt.

## 2024-05-05 ENCOUNTER — APPOINTMENT (OUTPATIENT)
Dept: OCCUPATIONAL THERAPY | Facility: CLINIC | Age: 64
DRG: 559 | End: 2024-05-05
Attending: PHYSICAL MEDICINE & REHABILITATION
Payer: COMMERCIAL

## 2024-05-05 ENCOUNTER — APPOINTMENT (OUTPATIENT)
Dept: PHYSICAL THERAPY | Facility: CLINIC | Age: 64
DRG: 559 | End: 2024-05-05
Attending: PHYSICAL MEDICINE & REHABILITATION
Payer: COMMERCIAL

## 2024-05-05 LAB
MAGNESIUM SERPL-MCNC: 1.5 MG/DL (ref 1.7–2.3)
MAGNESIUM SERPL-MCNC: 2 MG/DL (ref 1.7–2.3)

## 2024-05-05 PROCEDURE — 250N000011 HC RX IP 250 OP 636: Performed by: PHYSICAL MEDICINE & REHABILITATION

## 2024-05-05 PROCEDURE — 128N000003 HC R&B REHAB

## 2024-05-05 PROCEDURE — 36415 COLL VENOUS BLD VENIPUNCTURE: CPT | Performed by: PHYSICIAN ASSISTANT

## 2024-05-05 PROCEDURE — 97110 THERAPEUTIC EXERCISES: CPT | Mod: GP

## 2024-05-05 PROCEDURE — 97535 SELF CARE MNGMENT TRAINING: CPT | Mod: GO

## 2024-05-05 PROCEDURE — 83735 ASSAY OF MAGNESIUM: CPT | Performed by: PHYSICIAN ASSISTANT

## 2024-05-05 PROCEDURE — 36415 COLL VENOUS BLD VENIPUNCTURE: CPT | Performed by: PHYSICAL MEDICINE & REHABILITATION

## 2024-05-05 PROCEDURE — 97530 THERAPEUTIC ACTIVITIES: CPT | Mod: GP

## 2024-05-05 PROCEDURE — 250N000013 HC RX MED GY IP 250 OP 250 PS 637: Performed by: PHYSICIAN ASSISTANT

## 2024-05-05 PROCEDURE — 97530 THERAPEUTIC ACTIVITIES: CPT | Mod: GO

## 2024-05-05 PROCEDURE — 250N000011 HC RX IP 250 OP 636: Performed by: PHYSICIAN ASSISTANT

## 2024-05-05 PROCEDURE — 83735 ASSAY OF MAGNESIUM: CPT | Performed by: PHYSICAL MEDICINE & REHABILITATION

## 2024-05-05 RX ORDER — MAGNESIUM SULFATE HEPTAHYDRATE 40 MG/ML
2 INJECTION, SOLUTION INTRAVENOUS ONCE
Status: COMPLETED | OUTPATIENT
Start: 2024-05-05 | End: 2024-05-05

## 2024-05-05 RX ORDER — MAGNESIUM SULFATE HEPTAHYDRATE 40 MG/ML
2 INJECTION, SOLUTION INTRAVENOUS ONCE
Status: DISCONTINUED | OUTPATIENT
Start: 2024-05-05 | End: 2024-05-05

## 2024-05-05 RX ADMIN — MICONAZOLE NITRATE: 20 POWDER TOPICAL at 21:52

## 2024-05-05 RX ADMIN — Medication 1 CAPSULE: at 09:21

## 2024-05-05 RX ADMIN — ONDANSETRON 4 MG: 4 TABLET, ORALLY DISINTEGRATING ORAL at 08:05

## 2024-05-05 RX ADMIN — FLUTICASONE FUROATE AND VILANTEROL TRIFENATATE 1 PUFF: 200; 25 POWDER RESPIRATORY (INHALATION) at 09:22

## 2024-05-05 RX ADMIN — SENNOSIDES AND DOCUSATE SODIUM 1 TABLET: 8.6; 5 TABLET ORAL at 09:21

## 2024-05-05 RX ADMIN — CLOPIDOGREL BISULFATE 75 MG: 75 TABLET ORAL at 09:21

## 2024-05-05 RX ADMIN — SENNOSIDES AND DOCUSATE SODIUM 1 TABLET: 8.6; 5 TABLET ORAL at 21:52

## 2024-05-05 RX ADMIN — CARVEDILOL 6.25 MG: 6.25 TABLET, FILM COATED ORAL at 17:27

## 2024-05-05 RX ADMIN — NICOTINE 1 PATCH: 14 PATCH, EXTENDED RELEASE TRANSDERMAL at 09:22

## 2024-05-05 RX ADMIN — HEPARIN SODIUM 5000 UNITS: 5000 INJECTION, SOLUTION INTRAVENOUS; SUBCUTANEOUS at 09:21

## 2024-05-05 RX ADMIN — MAGNESIUM SULFATE HEPTAHYDRATE 2 G: 40 INJECTION, SOLUTION INTRAVENOUS at 17:26

## 2024-05-05 RX ADMIN — CETIRIZINE HYDROCHLORIDE 5 MG: 5 TABLET ORAL at 09:21

## 2024-05-05 RX ADMIN — CARVEDILOL 6.25 MG: 6.25 TABLET, FILM COATED ORAL at 09:21

## 2024-05-05 RX ADMIN — ROSUVASTATIN CALCIUM 10 MG: 5 TABLET, COATED ORAL at 21:53

## 2024-05-05 RX ADMIN — Medication 1 CAPSULE: at 21:53

## 2024-05-05 RX ADMIN — HEPARIN SODIUM 5000 UNITS: 5000 INJECTION, SOLUTION INTRAVENOUS; SUBCUTANEOUS at 21:52

## 2024-05-05 RX ADMIN — AMLODIPINE BESYLATE 5 MG: 5 TABLET ORAL at 09:21

## 2024-05-05 RX ADMIN — HYDROMORPHONE HYDROCHLORIDE 2 MG: 2 TABLET ORAL at 15:22

## 2024-05-05 RX ADMIN — SILVER SULFADIAZINE: 10 CREAM TOPICAL at 21:52

## 2024-05-05 ASSESSMENT — ACTIVITIES OF DAILY LIVING (ADL)
ADLS_ACUITY_SCORE: 39

## 2024-05-05 NOTE — PLAN OF CARE
Discharge Planner Post-Acute Rehab OT:      Discharge Plan: home with assist and HH OT      Precautions: fall, NWB of RLE, dialysis, sacral wound, elevate residual limb when in bed     Current Status:  ADLs:  Mobility: CGA for slide board to WC  Grooming: IND seated at sink in w/c  Dressing: UB: set up, LB: set up in supine, assist for brace and shoe  Bathing: TBD  Toileting: SB to commode Ax1  IADLs: Pt does most IADL including caregiving for .   Vision/Cognition: intact     Assessment: Session focused on progressing transfers, core strength and clothing management on commode. Pt with loose stools throughout session, fatigued quickly. Completed activity seated on mat leaning side to side and lifting LE to clear hips off mat surface to simulate leaning side to side on commode for clothing management.      Other Barriers to Discharge (DME, Family Training, etc): DME, family training, pt has support from multiple family members and will need to schedule family training since she will most likely still require assist with toileting.      5/4 Pt states her H is staying with his sister and she hopes this will con't.  She reports her brother will help her but she would like to be IND with toileting as he is not likely to assist her with toileting.

## 2024-05-05 NOTE — PLAN OF CARE
Discharge Plan: home with HH PT      Precautions: fall, NWB of RLE, sacral wound, elevate RLE in bed    Current Status:  Bed Mobility: [place]   Transfer: Slideboard SBA bed<>w/c   Gait: unable, unsafe  Stairs: not tested  Balance: Stable dynamic sitting.     Assessment: tolerated AKA exercise;  slide board and progressive fully sit to stand training in the // bars; 3 reps 5-15 seconds each;improved mechanics with PT cues      Other Barriers to Discharge (DME, Family Training, etc):   DME: K3 w/c (order faxed 4/30), slideboard, bedrail.     Family training: TBD. Car tx, bumping up 3STE in w/c.

## 2024-05-05 NOTE — PLAN OF CARE
Shift 0700 to 1930:    Goal Outcome Evaluation:      Plan of Care Reviewed With: patient    Overall Patient Progress: no change    Outcome Evaluation: Pt had many loose stools this shift, x7 per pt x5 recorded. Magnesium replaced this shift.    Pt A&Ox4, denies CP, vomiting, constipation, and tingling. Pt endorses nausea, PRN Zofran given with slight relief. Ax1 slide board, mod I from bed to chair with slide board. Stump protector when OOB. Pt has regular diet poor appetite this shift, pt takes medications whole with thin liquids. R CVC WDL. L PIV SL, WDL. Incontinence of B&B this shift, low urine output, pt on hemodialysis. Pt has wound on coccyx and stump, scattered bruising. Pt requested stump care be done after dinner, not done this shift, oncoming RN aware. Pt has call light in reach, calls appropriately, and has no unanswered questions or concerns at end of shift. Continue POC.

## 2024-05-05 NOTE — PLAN OF CARE
[id number] Shift    Patient is alert and oriented.  Patient transfers MOD/I / AO1.  Patient incontinent of bowel. Last bowel movement today. Patient incontinent bladder this shift also. Per report this is likely due to UTI. Oral ABX started this shift to treat UTI.   Hypertensive earlier today. This shift B/P: 142/64.  Patient is able to make needs known, uses the call light appropriately. Continue with the plan of care.

## 2024-05-05 NOTE — PLAN OF CARE
Goal Outcome Evaluation:    Overall Patient Progress: no changeOverall Patient Progress: no change    Patient is alert and oriented x4. Calls for needs. Does not want to be disturbed at night. Mod I with the slideboard from bed <> chair. A1 to the BSC. No alarms. Has uti and was started on oral abx. Can be a mix continence in bladder due to urgency. No complaints of pain tonight. No nausea or emesis. Sleeping during safety rounds.

## 2024-05-06 ENCOUNTER — APPOINTMENT (OUTPATIENT)
Dept: PHYSICAL THERAPY | Facility: CLINIC | Age: 64
DRG: 559 | End: 2024-05-06
Attending: PHYSICAL MEDICINE & REHABILITATION
Payer: COMMERCIAL

## 2024-05-06 ENCOUNTER — APPOINTMENT (OUTPATIENT)
Dept: GENERAL RADIOLOGY | Facility: CLINIC | Age: 64
End: 2024-05-06
Attending: PHYSICIAN ASSISTANT
Payer: COMMERCIAL

## 2024-05-06 ENCOUNTER — APPOINTMENT (OUTPATIENT)
Dept: OCCUPATIONAL THERAPY | Facility: CLINIC | Age: 64
DRG: 559 | End: 2024-05-06
Attending: PHYSICAL MEDICINE & REHABILITATION
Payer: COMMERCIAL

## 2024-05-06 LAB — MAGNESIUM SERPL-MCNC: 2.1 MG/DL (ref 1.7–2.3)

## 2024-05-06 PROCEDURE — 36415 COLL VENOUS BLD VENIPUNCTURE: CPT | Performed by: PHYSICAL MEDICINE & REHABILITATION

## 2024-05-06 PROCEDURE — 97535 SELF CARE MNGMENT TRAINING: CPT | Mod: GO | Performed by: STUDENT IN AN ORGANIZED HEALTH CARE EDUCATION/TRAINING PROGRAM

## 2024-05-06 PROCEDURE — 83735 ASSAY OF MAGNESIUM: CPT | Performed by: PHYSICAL MEDICINE & REHABILITATION

## 2024-05-06 PROCEDURE — 250N000013 HC RX MED GY IP 250 OP 250 PS 637: Performed by: PHYSICAL MEDICINE & REHABILITATION

## 2024-05-06 PROCEDURE — 99232 SBSQ HOSP IP/OBS MODERATE 35: CPT | Performed by: PHYSICIAN ASSISTANT

## 2024-05-06 PROCEDURE — 74018 RADEX ABDOMEN 1 VIEW: CPT | Mod: 26 | Performed by: STUDENT IN AN ORGANIZED HEALTH CARE EDUCATION/TRAINING PROGRAM

## 2024-05-06 PROCEDURE — 99232 SBSQ HOSP IP/OBS MODERATE 35: CPT | Mod: 24 | Performed by: INTERNAL MEDICINE

## 2024-05-06 PROCEDURE — 128N000003 HC R&B REHAB

## 2024-05-06 PROCEDURE — 97110 THERAPEUTIC EXERCISES: CPT | Mod: GP

## 2024-05-06 PROCEDURE — 74018 RADEX ABDOMEN 1 VIEW: CPT

## 2024-05-06 PROCEDURE — 250N000011 HC RX IP 250 OP 636: Performed by: PHYSICIAN ASSISTANT

## 2024-05-06 PROCEDURE — 250N000013 HC RX MED GY IP 250 OP 250 PS 637: Performed by: PHYSICIAN ASSISTANT

## 2024-05-06 RX ORDER — BUMETANIDE 1 MG/1
4 TABLET ORAL DAILY
Status: DISCONTINUED | OUTPATIENT
Start: 2024-05-06 | End: 2024-05-07

## 2024-05-06 RX ADMIN — CETIRIZINE HYDROCHLORIDE 5 MG: 5 TABLET ORAL at 09:26

## 2024-05-06 RX ADMIN — MICONAZOLE NITRATE: 20 POWDER TOPICAL at 20:49

## 2024-05-06 RX ADMIN — NICOTINE 1 PATCH: 14 PATCH, EXTENDED RELEASE TRANSDERMAL at 09:35

## 2024-05-06 RX ADMIN — SENNOSIDES AND DOCUSATE SODIUM 2 TABLET: 8.6; 5 TABLET ORAL at 20:48

## 2024-05-06 RX ADMIN — BUMETANIDE 4 MG: 1 TABLET ORAL at 15:17

## 2024-05-06 RX ADMIN — HYDROMORPHONE HYDROCHLORIDE 2 MG: 2 TABLET ORAL at 20:48

## 2024-05-06 RX ADMIN — FAMOTIDINE 20 MG: 20 TABLET ORAL at 16:33

## 2024-05-06 RX ADMIN — Medication 1 CAPSULE: at 09:25

## 2024-05-06 RX ADMIN — HEPARIN SODIUM 5000 UNITS: 5000 INJECTION, SOLUTION INTRAVENOUS; SUBCUTANEOUS at 09:37

## 2024-05-06 RX ADMIN — CARVEDILOL 6.25 MG: 6.25 TABLET, FILM COATED ORAL at 17:39

## 2024-05-06 RX ADMIN — CLOPIDOGREL BISULFATE 75 MG: 75 TABLET ORAL at 09:26

## 2024-05-06 RX ADMIN — ROSUVASTATIN CALCIUM 10 MG: 5 TABLET, COATED ORAL at 20:48

## 2024-05-06 RX ADMIN — HEPARIN SODIUM 5000 UNITS: 5000 INJECTION, SOLUTION INTRAVENOUS; SUBCUTANEOUS at 20:48

## 2024-05-06 RX ADMIN — Medication 1 CAPSULE: at 09:26

## 2024-05-06 RX ADMIN — AMLODIPINE BESYLATE 5 MG: 5 TABLET ORAL at 10:09

## 2024-05-06 RX ADMIN — SENNOSIDES AND DOCUSATE SODIUM 1 TABLET: 8.6; 5 TABLET ORAL at 09:25

## 2024-05-06 RX ADMIN — SILVER SULFADIAZINE: 10 CREAM TOPICAL at 21:55

## 2024-05-06 RX ADMIN — CARVEDILOL 6.25 MG: 6.25 TABLET, FILM COATED ORAL at 10:09

## 2024-05-06 RX ADMIN — SILVER SULFADIAZINE: 10 CREAM TOPICAL at 09:26

## 2024-05-06 RX ADMIN — FLUTICASONE FUROATE AND VILANTEROL TRIFENATATE 1 PUFF: 200; 25 POWDER RESPIRATORY (INHALATION) at 09:28

## 2024-05-06 RX ADMIN — MICONAZOLE NITRATE: 20 POWDER TOPICAL at 09:26

## 2024-05-06 RX ADMIN — LEVOFLOXACIN 500 MG: 500 TABLET, FILM COATED ORAL at 09:25

## 2024-05-06 RX ADMIN — Medication 1 CAPSULE: at 20:48

## 2024-05-06 ASSESSMENT — ACTIVITIES OF DAILY LIVING (ADL)
ADLS_ACUITY_SCORE: 39
ADLS_ACUITY_SCORE: 37
ADLS_ACUITY_SCORE: 39
ADLS_ACUITY_SCORE: 37
ADLS_ACUITY_SCORE: 39
ADLS_ACUITY_SCORE: 39
ADLS_ACUITY_SCORE: 37
ADLS_ACUITY_SCORE: 39
ADLS_ACUITY_SCORE: 37

## 2024-05-06 NOTE — PLAN OF CARE
Discharge Planner Post-Acute Rehab OT:      Discharge Plan: home with assist and HH OT      Precautions: fall, NWB of RLE, dialysis, sacral wound, elevate residual limb when in bed     Current Status:  ADLs:  Mobility: mod I for slide board to WC from bed  Grooming: IND seated at sink in w/c  Dressing: UB: IND, LB: IND in supine, IND for brace and shoe  Bathing: TBD  Toileting: SB to commode Ax1, assit for lydia cares and clothing depending on continence  IADLs: Pt does most IADL including caregiving for .   Vision/Cognition: intact     Assessment: Pt incontinent of urine, pt reports she felt she had to go but did not call for assist. Educated her on the fact that she needs to change her behaviors around this and take ever opportunity to be continent on the commode. Pt cleaned self up in bed with cues and set up. Pt most likely will not be able to do this if she is incontinent of BM. Gave pt handout on commode recommendation and advised her to get this ASAP.     Other Barriers to Discharge (DME, Family Training, etc): DME, family training, pt has support from multiple family members and will need to schedule family training since she will most likely still require assist with toileting.      5/4 Pt states her H is staying with his sister and she hopes this will con't.  She reports her brother will help her but she would like to be IND with toileting as he is not likely to assist her with toileting.

## 2024-05-06 NOTE — PROGRESS NOTES
"  [facility]   Acute Rehabilitation Unit  Daily progress note    INTERVAL HISTORY  Weekend and therapy notes reviewed, no acute events reported.  She was started on levofloxacin for possible UTI with some growth on urine culture.    Shirley Hendricks was seen at bedside this morning.  She reports that she is feeling emotional/overwhelmed/stressed, mostly with regards to her plan after leaving acute rehab.  She is unsure of her discharge location and who will be available to assist.  She hates to ask family members to help.  Her sister will be able to assist in about 1 month after finishing school year.  She needs a wheelchair accessible location in the interim.  She reports feeling fatigued after AM therapy session, did a lot of wheelchair propelling outside, says her shoulders feeling \"rubbery\".  She notes that she has not had recurrent abdominal pain, nausea, or vomiting since last Thursday.  She notes ongoing frequent bowel movements, which are still loose, but \"not as much\" as previously.  She states the frequency has been variable.  She is continuing to use gluten-free diet, though feels she is eating less because of this and dislike for hospital foods.  She denies any dysuria.  No urine output yesterday that she could recall.  She reports to be sleeping ok.  She has ongoing leg pain, now more phantom pain/sensations.  Her pain level is increased after therapies but does not feel it has been a barrier.    With therapies, she is currently mod I for slide board transfers between wheelchair and bed, has not been able to progress to mod I for toileting due to incontinence.  She is IND with dressing and grooming.    MEDICATIONS  Current Facility-Administered Medications   Medication Dose Route Frequency Provider Last Rate Last Admin    amLODIPine (NORVASC) tablet 5 mg  5 mg Oral Daily Corrina Hurt PA-C   5 mg at 05/05/24 0921    carvedilol (COREG) tablet 6.25 mg  6.25 " mg Oral BID w/meals Corrina Hurt PA-C   6.25 mg at 05/05/24 1727    cetirizine (zyrTEC) tablet 5 mg  5 mg Oral Daily Corrina Hurt PA-C   5 mg at 05/05/24 0921    clopidogrel (PLAVIX) tablet 75 mg  75 mg Oral Daily Corrina Hurt PA-C   75 mg at 05/05/24 0921    famotidine (PEPCID) tablet 20 mg  20 mg Oral Q48H Corrina Hurt PA-C   20 mg at 05/04/24 1741    fluticasone-vilanterol (BREO ELLIPTA) 200-25 MCG/ACT inhaler 1 puff  1 puff Inhalation Daily Corrina Hurt PA-C   1 puff at 05/05/24 0922    gabapentin (NEURONTIN) capsule 200 mg  200 mg Oral Once per day on Tuesday Thursday Saturday Corrina Hurt PA-C   200 mg at 05/04/24 2152    heparin ANTICOAGULANT injection 5,000 Units  5,000 Units Subcutaneous Q12H Corrina Hurt PA-C   5,000 Units at 05/05/24 2152    lactobacillus rhamnosus (GG) (CULTURELL) capsule 1 capsule  1 capsule Oral BID Corrina Hurt PA-C   1 capsule at 05/05/24 2153    levofloxacin (LEVAQUIN) tablet 500 mg  500 mg Oral Every Other Day Maya Watkins MD        miconazole (MICATIN) 2 % powder   Topical BID Corrina Hurt PA-C   Given at 05/05/24 2152    multivitamin RENAL (TRIPHROCAPS) capsule 1 capsule  1 capsule Oral Daily Corrina Hurt PA-C   1 capsule at 05/05/24 0921    nicotine (NICODERM CQ) 14 MG/24HR 24 hr patch 1 patch  1 patch Transdermal Daily Corrina Hurt PA-C   1 patch at 05/05/24 0922    rosuvastatin (CRESTOR) tablet 10 mg  10 mg Oral At Bedtime Corrina Hurt PA-C   10 mg at 05/05/24 2153    senna-docusate (SENOKOT-S/PERICOLACE) 8.6-50 MG per tablet 1-2 tablet  1-2 tablet Oral BID Corrina Hurt PA-C   1 tablet at 05/05/24 2152    silver sulfADIAZINE (SILVADENE) 1 % cream   Topical Daily Gala Lo MD   Given at 05/05/24 2152    sodium chloride (PF) 0.9% PF flush 9 mL  9 mL Intracatheter During Dialysis/CRRT (from stock) Soo Mejia MD        sodium chloride (PF) 0.9% PF  flush 9 mL  9 mL Intracatheter During Dialysis/CRRT (from stock) Soo Mejia MD              Current Facility-Administered Medications   Medication Dose Route Frequency Provider Last Rate Last Admin    acetaminophen (TYLENOL) tablet 500-1,000 mg  500-1,000 mg Oral Q6H PRN Corrina Hurt PA-C   1,000 mg at 05/01/24 1441    alteplase (CATHFLO ACTIVASE) injection 2 mg  2 mg Intracatheter Q1H PRN Soo Mejia MD        alteplase (CATHFLO ACTIVASE) injection 2 mg  2 mg Intracatheter Q1H PRN Soo Mejia MD        [Held by provider] diphenoxylate-atropine (LOMOTIL) 2.5-0.025 MG per tablet 1 tablet  1 tablet Oral 4x Daily PRN Corrina Hurt PA-C   1 tablet at 04/30/24 1538    HYDROmorphone (DILAUDID) tablet 2 mg  2 mg Oral Q6H PRN Corrina Hurt PA-C   2 mg at 05/05/24 1522    naloxone (NARCAN) injection 0.2 mg  0.2 mg Intravenous Q2 Min PRN Arun Pimentel MD        Or    naloxone (NARCAN) injection 0.4 mg  0.4 mg Intravenous Q2 Min PRN Arun Pimentel MD        Or    naloxone (NARCAN) injection 0.2 mg  0.2 mg Intramuscular Q2 Min PRN Arun Pimentel MD        Or    naloxone (NARCAN) injection 0.4 mg  0.4 mg Intramuscular Q2 Min PRN Arun Pimentel MD        nitroGLYcerin (NITROSTAT) sublingual tablet 0.4 mg  0.4 mg Sublingual Q5 Min PRN Corrina Hurt PA-C        ondansetron (ZOFRAN ODT) ODT tab 4 mg  4 mg Oral Q6H PRN Corrnia Hurt PA-C   4 mg at 05/05/24 0805    polyethylene glycol (MIRALAX) powder 17 g  17 g Oral Daily PRN Corrina Hurt PA-C        sodium chloride (PF) 0.9% PF flush 10 mL  10 mL Intracatheter Q15 Min PRN Soo Mejia MD        sodium chloride (PF) 0.9% PF flush 10 mL  10 mL Intracatheter Q15 Min PRN Soo Mejia MD        sodium chloride 0.9% BOLUS 100-150 mL  100-150 mL Intravenous Q15 Min PRN Soo Mejia MD            PHYSICAL EXAM  BP (!) 154/64 (BP Location: Left arm)   Pulse 64   Temp 98  F (36.7  C)  "(Oral)   Resp 16   Ht 1.549 m (5' 0.98\")   Wt 52.3 kg (115 lb 4.8 oz)   LMP  (LMP Unknown)   SpO2 93%   BMI 21.80 kg/m    Gen: NAD, up in chair  HEENT: NC/AT, MMM  Cardio: RRR, no murmurs, +dialysis catheter R chest  Pulm: non-labored on room air, lungs CTA bilaterally  Abd: soft, non-tender, non-distended, +bowel sounds  Ext: some edema in RLE; charcot foot deformity on left; R AKA site not visualized today  Neuro/MSK: awake, alert, moving all extremities actively in chair      LABS  CBC RESULTS:   Recent Labs   Lab Test 05/02/24  0551 05/01/24  0925 04/30/24  0653   WBC 3.6* 3.9* 3.3*   RBC 2.80* 3.05* 2.88*   HGB 8.7* 9.3* 8.8*   HCT 26.9* 29.5* 27.2*   MCV 96 97 94   MCH 31.1 30.5 30.6   MCHC 32.3 31.5 32.4   RDW 18.4* 19.0* 18.4*    222 215       Last Basic Metabolic Panel:  Recent Labs   Lab Test 05/04/24  0249 05/02/24  0551 04/30/24  0653   * 122* 124*   POTASSIUM 4.0 4.5 4.4   CHLORIDE 92* 90* 91*   CO2 24 23 23   ANIONGAP 10 9 10   GLC 80 81 79   BUN 17.8 25.3* 30.5*   CR 2.98* 3.74* 4.16*   GFRESTIMATED 17* 13* 11*   SONIA 7.4* 7.3* 7.6*         Rehabilitation - continue comprehensive acute inpatient rehabilitation program with multidisciplinary approach including therapies, rehab nursing, and physiatry following. See interval history for updates.      ASSESSMENT AND PLAN  Shirley Hendricks is a 63 year old right hand dominant female with complicated past medical history including but not limited to PAD with multiple prior bilateral lower extremity vascular bypass grafts and thrombectomies (most recently 10/2023 right common femoral to proximal anterior tibial PTFE bypass graft), bilateral Charcot-Breonna-Tooth foot deformity, prior bilateral carotid endarterectomies, B-cell lymphoma, CAD (hx Mix3), hypertension, hyperlipidemia, GI bleed (12/2023), type 2 diabetes mellitus, COPD, tobacco use disorder, iron deficiency anemia, GERD, Celiac disease, Takotsubo cardiomyopathy, SVT, " depression/anxiety and spongiotic dermatitis who was admitted on 3/29/24 with acute occlusion of right lower extremity arterial bypass graft now s/p right above-knee amputation 4/1/24, completed 4/9/24 with hospital course complicated by acute renal failure now on dialysis, sepsis, acute blood loss anemia, acute post-operative pain, nausea, loose stools, delirium, malnutrition, and multiple electrolyte derangements.  She is now admitted to ARU on 4/22/24 for multidisciplinary rehabilitation and ongoing medical management.        Admission to acute inpatient rehab 04/22/24.    Impairment group code: Amputation 05.3 Unilateral LE AKA; emergent R AKA due to acute occlusion of RLE bypass graft         PT and OT 90 minutes of each daily for 6 days per week in addition to rehab nursing and close management of physiatrist.       Impairment of ADL's: Noted to have impaired activity tolerance, impaired balance, impaired strength, impaired weight shifting, and pain, all affecting her ability to safely and independently perform basic ADLs.  Goal for mod I with basic wheelchair-based ADLs with assist for bathing, as well as assist IADLs/heavier activities.     Impairment of mobility:  Noted to have impaired activity tolerance, impaired balance, impaired strength, impaired weight shifting, and pain, all affecting her ability to safely and independently perform basic mobility.  Goal for mod I with basic wheelchair-based mobility.     Impairment of cognition/language/swallow:  Noted to have dysphagia with goals for safe tolerance of least restrictive diet.  However, IP SLP noting did not anticipate further SLP services at ARU, no issues with tolerating regular diet.  Will not consult initially.  If any concern for difficulty tolerating current diet, can consult.     Medical Conditions  New actions/orders/updates for today are in blue.     S/p right AKA 4/1/24, completed 4/9/24 due to critical limb ischemia, acute occlusion of  right common femoral artery to mid anterior tibial artery bypass graft   PAD/PVD with hx multiple prior vascular interventions to BLE  Bilateral Charcot-Breonna-Tooth foot deformities  Acute post-op pain  - Wound care: daily dressing changes to right AKA with xeroform and gauze with kerlix to keep wound protected  - Dehiscence of lateral surgical incision with some increased serosanguinous drainage.  Also with multiple areas with necrotic appearance.  Overall similar to day of ARU admission although with more drainage.  Vascular surgery consulted/assessed on 4/28.  Appreciate assist.  Recommended ongoing daily dressing changes with Xeroform, gauze fluffs, loosely wrapped Kerlix.  DO NOT use ace wrap.  Stockinette can be placed into over-the-shoulder sling to prevent right AKA dressing from falling off during therapies.  Silvadene topical also added per vascular surgery on 4/29, to be applied daily to incision.  Some increased erythema noted at surgical site on 5/2.  Vascular surgery reassessed 5/3 and felt overall appearance not concerning.  Will likely eventually need debridement of anterior thigh but that will be deferred/evaluated at OP follow-up with Dr. Hernandez.  No indication for antibiotics at this time.  Continue current plan of care.  - Also with significant itching at surgical site.  Recently seen by derm for dermatitis as below and recommended to trial zyrtec or claritin for itching.  Started zyrtec 5 mg daily on 4/25.  - NWB RLE  - Continue PTA L foot custom orthotic   - Continue Plavix 75 mg daily (given hx left bypass).  No ongoing need for Xarelto per vascular (no recent DVT or PE)  - Pain management: APAP 500-1000 mg q6h PRN, dilaudid 2 mg q6h PRN, gabapentin 200 mg 3x/week after HD (renally dosed).  Wean opioids as able.   - Continue PT/OT  - Follow up with vascular surgery in 2-4 weeks (~5/28)     Acute blood loss anemia on anemia of chronic disease, hx iron deficiency  Retroperitoneal  hematoma  Recent GI bleed (hospitalized 12/2023)  Required intermittent transfusion during this admission (3/30, 3/31, 4/2, 4/6, 4/9, 4/13).  Repeat CT abdomen on 4/20 with stable right retroperitoneal hematoma; no s/o active bleed.  5/2: Hgb stable at 8.7  - Repeat CT abd/pelvis on 5/2 demonstrated decreased small right retroperitoneal hematoma (iatrogenic)  - Continue epo/venofer with HD per nephrology  - Trend CBC every T/Th/Sat  - Transfuse for Hgb <7     Acute renal failure on HD  Hyponatremia  Normal baseline Cr, though per nephrology, suspect some modest CKD.  LIMA this admission, up to peak Cr 4/22 on 4/3.  Likely BAILEY +/- rhabdo +/- ischemic injury with no signs of recovery and now dialysis dependent (started 4/3/24).  S/p tunneled dialysis catheter placement 4/3/24.  5/4: Cr 2.98; Na slightly lower at 126    - Nephrology consulted, appreciate ongoing assistance  - HD T/Th/Sat at ARU or per nephrology recs.  Plan [facility]Th[facility]Sat  - S/p diuretic challenge (Lasix 100 mg PO) on 4/29 per nephrology without improved output.  Evidence of fluid overload on CT abd/pelv 5/2 with mod pleural effusions, small volume ascites, diffuse soft tissue anasarca.  Per nephrology, recommended Bumex 4 mg daily.  Had not yet been ordered by weekend team so ordered today.  - Trend BMP/mag/phos on HD days  - Continue 1L fluid restriction  - Avoid NSAIDs/nephrotoxins, renally dose medications     Bilateral pleural effusion  Noted on CT abdomen 4/20 with moderate b/l pleural effusion (left>right).  Has been intermittently requiring 1-2L supplemental oxygen.  Unclear if related to volume given new renal failure on HD, also baseline COPD.  Repeat CT abd/pelv on 5/2 with moderate pleural effusions  - Monitor respiratory status, supplemental O2 by NC PRN to maintain sats >88%  - Consider thoracentesis if symptomatic or worsening hypoxia      Celiac disease  Colon distension   Loose stools,  "improving  Nausea/vomiting, improving  Severe malnutrition in context of acute on chronic illness  Pill cam study with [facility] in 3/2024 (due to recent GI bleed), which revealed mild non-erosive red tissue in the duodenum, some atrophic type appearance that could be celiac disease.  This was followed by celiac labs on 3/20/24 (Gliadin ab and tTG IgA antibody) which were positive and thus Celiac diagnosis was established and she was recommended for gluten-free diet.  This admission, noted to have colonic distension and circumferential wall thickening of the distal colon and rectum concerning for proctocolitis on CT abdomen.  Also with loose stools, nausea.  Despite this, patient declined gluten-free diet (switched on 4/18).  Noted to have improvement in loose stools and nausea at discharge to ARU per sending team.  However, has persisted with loose stools, intermittent nausea and vomiting.  Repeat cdiff on 4/28 negative.  Suspect symptoms are due to Celiac disease and non-compliance with gluten-free diet.  Patient agreeable to trial of gluten-free diet started on 4/28.  Due to recurrent nausea/emesis, ongoing loose stools, + new rebound/guarding in right abdomen, CT abdomen/pelvis obtained on 5/2 which showed \"persistent wall thickening of the descending and sigmoid colon in addition to the rectum, similar to prior study. This may also be due  to overall third spacing of fluid versus colitis given diarrhea history. Large volume of stool.\"  Loose stools could represent obstructive diarrhea.  Lomotil held, scopolamine stopped.  Started on bowel protocol.  - Continue gluten-free diet  - RD consulted, appreciate assistance  - No recurrent abdominal pain, nausea, or vomiting.  Ongoing loose stools but improving in consistency per patient, frequency varies and continues to cause incontinence.  AXR with nonobstructive bowel gas pattern, moderate colonic stool burden, overall not significantly changed.  Trial enema after " evening meal.  - Continue senokot-S 1-2 tabs BID scheduled  - PRN Zofran  - Continue probiotics BID  - Monitor symptoms  - Follow up with [facility] as outpatient    Abnormal UA  Given ongoing nausea/vomiting and abdominal/pelvic pain, as well as bladder wall thickening on CT, UA obtained to rule out UTI.   Results with large proteinuria, negative nitrites, small hematuria, large leuk esterase, many bacteria.  UC growing >100k colonies candida and 10-50k colonies of E. Coli.  Started on levofloxacin.  - Continue levofloxacin 500 mg every other day x4 doses  - No clear urinary symptoms.    Hypomagnesemia  Replaced mag IV intermittently.  5/6: mag improved to 2.1 after IV replacement yesterday  - Continue to trend     Hypocalcemia  4/30: Ca corrects to 8.8 (low end of normal) for hypoalbuminemia  - Per pharmacy, was on PTA calcium carbonate/vit D PTA (had run out 1 week before admission), not ordered while at hospital.  If corrected Ca remains low, reach out to nephrology to consider if resumption indicated  - Continue to trend    CAD (hx MI x3)  HTN  Hyperlipidemia  Hx Takotsubo CM  Hx SVT  Last coronary angiogram completed 10/2023.  Recovered EF (55-60%) from ECHO 11/2023.  - Continue PTA Coreg 6.25 mg BID, amlodipine 5 mg daily  - Hold PTA lisinopril 10 mg daily due to renal failure  - PTA Jardiance 10 mg daily discontinued due to renal failure  - Rosuvastatin 10 mg daily resumed on ARU admission (previously held due to concern for rhabdo).  Monitor hepatic panel in 1 week (ordered for 5/7)    - Per nephrology recs on 5/4, given BP remains uncontrolled, recommended to start Bumex 4 mg daily.  - Monitor BP  - Was to follow up with cardiology in Feb 2024, should be scheduled after discharge    Murmur  Noted on exam this admission by attending physiatrist.  Per chart review, intermittently documented in the past with questionable/subtle murmur (though not by cardiology).  Most recent echo 11/7/23 with trace mitral regurg,  trace tricuspid regurg, and moderate trileaflet aortic sclerosis.  - Folllow up with cardiology as above     Hx bilateral carotid artery stenosis s/p bilateral carotid endarterectomies  - Follow up with vascular surgery for annual carotid duplex (last done on 1/4/24 with stable stenosis of right carotid bulb)     Diabetes mellitus, type II  Per chart review.  A1c this admission 5.2%.  PTA Jardiance and gabapentin discontinued due to acute renal failure.  BG well-controlled during this admission without need for insulin.  - Monitor BG periodically with labs     B-cell lymphoma, stage VALENTIN  Followed by [facility] (Dr. Barrow).  Mild radiographic progression on CT 1/26/24.  Given asymptomatic, plans at that time for ongoing CT monitoring.  - Monitor for development of any B symptoms  - Trend CBC  - Follow up with oncology, repeat CT as planned in 7/2024     COPD  Tobacco use disorder  PTA smoking ~5 cigarettes per day  - Monitor respiratory status, supplemental O2 by NC PRN to maintain sats >88%  - Continue PTA Breo Ellipta  - Nicotine patch 14 mcg/day  - Ongoing education/resources for cessation     Adjustment disorder with mixed mood  Hx MDD/anxiety (per chart though patient denies)  Delirium, resolved  Not on medications PTA.  Patient denies any hx depression/anxiety but does note depressed mood in setting of recent AKA and significant home stressors.  - Psychology and spiritual services consulted, appreciate assist  - Monitor mood     GERD  PTA on omeprazole 20 mg daily  - Continue pepcid 20 mg q48 hours (renally dosed) given allergy to pantoprazole (formulary sub for omeprazole)     Spongiotic dermatitis  Recently seen by OP dermatology, recommended betamethasone cream BID PRN, not using regularly at hospital  - Consider resumption of topical steroids if recurrent symptoms  - Started Zyrtec 4/25 as above for itching near surgical site     Sacral wound: pressure injury (stage 2 vs 3), incontinence-associated  dermatitis, moisture-associated skin damage  Assessed by [facility] on 4/22 at [facility].  - WOCN consulted for ongoing follow-up at ARU  - Wound care per WOCN orders  - Pressure reduction strategies    Endometrial thickening  Noted incidentally on CT abdomen/pelvis.  Patient denies postmenopausal bleeding.  - PCP to follow-up, consider outpatient pelvic ultrasound to better assess     Adjustment to disability:  Clinical psychology to eval and treat if indicated  FEN: gluten free diet, 1000 mL fluid restriction  Bowel: incontinent, recent loose stools as above  Bladder: incontinent  DVT Prophylaxis: subcutaneous heparin  GI Prophylaxis: pepcid  Code: full, confirmed on admission  Disposition: goal for home though now unclear plan due to need for wheelchair accessible home, family assist  ELOS:  target 5/13/24  Follow up Appointments on Discharge: PCP in 1-2 weeks, vascular surgery in 2 weeks, [facility], nephrology (ongoing HD), heme/onc ([facility], 7/2024)      35 minutes spent on the date of service doing chart review, history and exam, documentation, and further activities as noted above.    Plan of care was discussed with Dr. Arun Pimentel, PM&R staff physician    Corrina Hurt PA-C  Physical Medicine & Rehabilitation

## 2024-05-06 NOTE — PLAN OF CARE
Goal Outcome Evaluation:    Pt. Is A/O x 4, able to make needs known. Call light within reach. Pt. Denies SOB, CP, N/V. No acute changes in this shift. Continue POC.

## 2024-05-06 NOTE — PLAN OF CARE
"Goal Outcome Evaluation:    Pt. Is A/O x 4, able to make needs known. Call light within reach. Denies SOB, CP, N/V. No acute changes on this shift. Continue POC.  Recent VS:  /65   Pulse 65   Temp 98  F (36.7  C) (Oral)   Resp 16   Ht 1.549 m (5' 0.98\")   Wt 52.3 kg (115 lb 4.8 oz)   LMP  (LMP Unknown)   SpO2 93%   BMI 21.80 kg/m     " Unable to consent due to medical condition

## 2024-05-06 NOTE — PROGRESS NOTES
Nephrology Progress Note  05/06/2024         Assessment & Recommendations:   Shirley Hendricks is a 63 year old year old with peripheral artery disease, coronary artery disease and history of MI, hypertension, diabetes mellitus type 2 who is admitted to ARU following hospitalization for occlusion of right LE bypass graft managed with right above-the-knee amputation and complication of acute kidney injury requiring dialysis.     #Acute kidney injury: multifactorial (use of IV contrast 3 days in a row for evaluation and management of her occluded bypass graft, ischemia and possibly rhabdomyolysis) Creatinine level was 0.7 mg/dL prior to this episode. No urine studies available prior to LIMA to assess baseline proteinuria.  -First dialysis run 4/3/2024.  -Access tunneled CVC initially placed 4/3/2024 and revised on 4/15/2024.  - Pursue dialysis Tuesday, Thursday, Saturday for now but continue to watch closely for recovery  - HD today but no UF as she has had poor intake-   - had been receiving hemodialysis at [facility] and consents to ongoing hemodialysis while at [facility].     #Hyponatremia: slightly improved to 126 This is due to fluid intake in the absence of significant renal function.  - 1 L daily fluid restriction and should also improve with dialysis    # Systolic Blood pressure remains suboptimal. Relative bradycardia on carvedilol 6.25 mg twice daily.   On amlodipine 5 mg daily and bumetanide 4 mg on non-dialysis days  #PAD/PVD  #Right common femoral artery to the mid anterior tibial artery bypass graft acute occlusion  #Right above-the-knee amputation 4/9/2024  - Plavix 75 mg daily (previously on Xarelto but no ongoing indication)     #Anemia due to acute blood loss with retroperitoneal hematoma and history of GI bleeding in December 2023.  - hgb at 8.7 on 5/02  - retacrit 10K units three times weekly with HD  -Received iron sucrose on 4/30     #Coronary artery disease with history of myocardial  "infarction x 3  #History of Takotsubo cardiomyopathy  PTA carvedilol and lisinopril, lisinopril has been on hold.  PTA rosuvastatin was initially held due to rhabdo but resumed.  PTA empagliflozin 10 mg daily has been discontinued given acute kidney injury     #Diabetes mellitus type 2     #Stage Mark marginal zone B-cell lymphoma  - Undergoing surveillance with [place] [facility]     Recommendations were communicated to primary team via note    I spent a total of 35 minutes in reviewing the medical records, face-to-face evaluation and physical exam, review of labs, counseling, orders, care coordination and documentation      Case Cohen MD   Division of Renal Disease and Hypertension  [facility]  Vocera Web Console    Interval History : May 6, 2024    Nursing and provider notes from last 24 hours reviewed.  Patient seen and examined this afternoon.  Patient reported feeling better.  No acute complaints.  Denied CP, SOB, pain.  No N/V  Reports voiding some urine. Volume not recorded.  No labs today     Physical Exam:   I/O last 3 completed shifts:  In: 1420 [P.O.:1420]  Out: -    /61 (BP Location: Left arm)   Pulse 65   Temp 98.6  F (37  C) (Oral)   Resp 18   Ht 1.549 m (5' 0.98\")   Wt 52.3 kg (115 lb 4.8 oz)   LMP  (LMP Unknown)   SpO2 93%   BMI 21.80 kg/m       GENERAL APPEARANCE: no distress  EYES:  no scleral icterus, pupils equal  Mouth: dry mucosa  PULM: normal work of breathing.  Lungs clear to lat auscultation  CV: RRR. No rub, gallop  1+ left foot edema  R AKA  INTEGUMENT: no cyanosis, no rash  NEURO:  speech fluent, face symmetric    Labs:   All labs reviewed by me  Electrolytes/Renal -   Recent Labs   Lab Test 05/06/24  0614 05/05/24  2159 05/05/24  0642 05/04/24  0249 05/02/24  0551 04/30/24  0653   NA  --   --   --  126* 122* 124*   POTASSIUM  --   --   --  4.0 4.5 4.4   CHLORIDE  --   --   --  92* 90* 91*   CO2  --   --   --  24 23 23   BUN  --   --   --  17.8 25.3* 30.5*   CR  --   --   " --  2.98* 3.74* 4.16*   GLC  --   --   --  80 81 79   SONIA  --   --   --  7.4* 7.3* 7.6*   MAG 2.1 2.0 1.5* 1.8 1.5* 1.9   PHOS  --   --   --  2.9 3.2 3.8       CBC -   Recent Labs   Lab Test 05/02/24  0551 05/01/24  0925 04/30/24  0653   WBC 3.6* 3.9* 3.3*   HGB 8.7* 9.3* 8.8*    222 215       LFTs -   Recent Labs   Lab Test 04/30/24  0653 04/23/24  0602 04/17/24  0636 04/15/24  0536 04/09/24  0636   ALKPHOS 100 96  --   --  155*   BILITOTAL 0.4 0.5  --   --  0.9   ALT 24 20  --   --  53*   AST 21 16  --   --  68*   PROTTOTAL 4.3* 4.1*  --   --  3.9*   ALBUMIN 2.0*  2.0* 1.9* 2.3*   < > 1.8*    < > = values in this interval not displayed.       Iron Panel -   Recent Labs   Lab Test 04/10/24  0559 04/09/24  1817 01/08/24  1542 12/11/23  0550   IRON 22*  --  71 122   IRONSAT 18  --  17 32   BRYN  --  609* 25 23       Current Medications:  Current Facility-Administered Medications   Medication Dose Route Frequency Provider Last Rate Last Admin    amLODIPine (NORVASC) tablet 5 mg  5 mg Oral Daily Corrina Hurt PA-C   5 mg at 05/05/24 0921    carvedilol (COREG) tablet 6.25 mg  6.25 mg Oral BID w/meals Corrina Hurt PA-C   6.25 mg at 05/05/24 1727    cetirizine (zyrTEC) tablet 5 mg  5 mg Oral Daily Corrina Hurt PA-C   5 mg at 05/05/24 0921    clopidogrel (PLAVIX) tablet 75 mg  75 mg Oral Daily Corrina Hurt PA-C   75 mg at 05/05/24 0921    famotidine (PEPCID) tablet 20 mg  20 mg Oral Q48H Corrina Hurt PA-C   20 mg at 05/04/24 1741    fluticasone-vilanterol (BREO ELLIPTA) 200-25 MCG/ACT inhaler 1 puff  1 puff Inhalation Daily Corrina Hurt PA-C   1 puff at 05/05/24 0922    gabapentin (NEURONTIN) capsule 200 mg  200 mg Oral Once per day on Tuesday Thursday Saturday Corrina Hurt PA-C   200 mg at 05/04/24 2152    heparin ANTICOAGULANT injection 5,000 Units  5,000 Units Subcutaneous Q12H Corrina Hurt PA-C   5,000 Units at 05/05/24 2152    lactobacillus  rhamnosus (GG) (CULTURELL) capsule 1 capsule  1 capsule Oral BID Corrina Hurt PA-C   1 capsule at 05/05/24 2153    levofloxacin (LEVAQUIN) tablet 500 mg  500 mg Oral Every Other Day Maya Watkins MD        miconazole (MICATIN) 2 % powder   Topical BID Corrina Hurt PA-C   Given at 05/05/24 2152    multivitamin RENAL (TRIPHROCAPS) capsule 1 capsule  1 capsule Oral Daily Corrina Hurt PA-C   1 capsule at 05/05/24 0921    nicotine (NICODERM CQ) 14 MG/24HR 24 hr patch 1 patch  1 patch Transdermal Daily Corrina Hurt PA-C   1 patch at 05/05/24 0922    rosuvastatin (CRESTOR) tablet 10 mg  10 mg Oral At Bedtime Corrina Hurt PA-C   10 mg at 05/05/24 2153    senna-docusate (SENOKOT-S/PERICOLACE) 8.6-50 MG per tablet 1-2 tablet  1-2 tablet Oral BID Corrina Hurt PA-C   1 tablet at 05/05/24 2152    silver sulfADIAZINE (SILVADENE) 1 % cream   Topical Daily Gala Lo MD   Given at 05/05/24 2152    sodium chloride (PF) 0.9% PF flush 9 mL  9 mL Intracatheter During Dialysis/CRRT (from stock) Soo Mejia MD        sodium chloride (PF) 0.9% PF flush 9 mL  9 mL Intracatheter During Dialysis/CRRT (from stock) Soo Mejia MD         Current Facility-Administered Medications   Medication Dose Route Frequency Provider Last Rate Last Admin    heparin 10,000 units/10 mL infusion (DIALYSIS USE)  500 Units/hr Hemodialysis Machine Continuous Soo Mejia MD 0.5 mL/hr at 05/04/24 1200 500 Units/hr at 05/04/24 1200     Case Cohen MD

## 2024-05-06 NOTE — PROGRESS NOTES
[employer] bal[employer]inder completed and sent to [employer] via email today for processing. Information added to pt's AVS. Primary SW, Donna Payne MSW updated.     ALONA Sampson  [facility] Acute [facility]patient [facility]hab    PH: [phone] & Fax: [phone]  Email: [email or web address]

## 2024-05-06 NOTE — PROGRESS NOTES
End of shift Summary: See flowsheet for VS and detail assessments.     Changes this Shift:      Pulmonary: Pt denies SOB & chest pain. Pt is on RA.     Diet: Gluten free diet, 1000 mL fluid restriction, medication whole with thin liquids.    Output: Pt is voiding adequately, incontinent of bowel and bladder, LBM 05/05.     Activity: Pt is assist of 1 stand by in halls, CGA to w/c for transfers.     Skin: R AKA.     Pain: Pt denies pain.     Neuro/CMS: Pt is alert and oriented x 4. Denies numbness and tingling.     Dressings/Drains: R AKA dressing changed on shift. R shoulder nicotine patch.     IV: L PIV, R internal jugular double lumen.     Additional info: Electrolyte labs ordered per protocol. No significant changes on shift.     Plan:  Plan of care ongoing.

## 2024-05-06 NOTE — PLAN OF CARE
Discharge Plan: home with HH PT      Precautions: fall, NWB of RLE, sacral wound, elevate RLE in bed    Current Status:  Bed Mobility: [place]a   Transfer: [place] slideboard bed<>w/c, Siddharth for commode.   Gait: unable, unsafe  Stairs: not tested  Balance: Stable dynamic sitting.     Assessment: Limited tolerance in PM d/t fatigue. W/c on track for delivery to ARU for Friday.     Other Barriers to Discharge (DME, Family Training, etc):   DME: K3 w/c (delivery scheduled for 5/10), slideboard, bedrail.     Family training: TBD. Car tx, bumping up 3STE in w/c.

## 2024-05-06 NOTE — PROGRESS NOTES
JUANITA received a call from pt's sister, Yue, who expressed many concerns regarding a potential discharge home this Sunday, 5/12. Yue reports pt's brother and son won't be able to provide much assistance, and pt only has her rental home until 5/27, a living situation after 5/27 has not been identified yet. Per ARU JUANITA [place], extended stay hotel resources provided to Yue. Yue asked if TCU would still be an option to give pt's family more time to plan a discharge location, will further discuss during rounds.     Discharge concerns discussed during huddle. Will plan for an extended rounds tomorrow to further discuss discharge options.    JUANITA called Yue in the afternoon and provided rounds time for tomorrow. Yue and pt's daughter, Malu, plan to attend.    Yue also let JUANITA know she received a call from [employer] confirming pt's wheelchair will be delivered to the unit on Friday 5/10. Primary PT notified.    DAWN Faith  Post Acute Float   ARU/TCU/CARLOS    Phone: [phone]  Fax: [phone]

## 2024-05-07 ENCOUNTER — APPOINTMENT (OUTPATIENT)
Dept: PHYSICAL THERAPY | Facility: CLINIC | Age: 64
DRG: 559 | End: 2024-05-07
Attending: PHYSICAL MEDICINE & REHABILITATION
Payer: COMMERCIAL

## 2024-05-07 ENCOUNTER — APPOINTMENT (OUTPATIENT)
Dept: OCCUPATIONAL THERAPY | Facility: CLINIC | Age: 64
DRG: 559 | End: 2024-05-07
Attending: PHYSICAL MEDICINE & REHABILITATION
Payer: COMMERCIAL

## 2024-05-07 LAB
ALBUMIN SERPL BCG-MCNC: 1.9 G/DL (ref 3.5–5.2)
ALP SERPL-CCNC: 90 U/L (ref 40–150)
ALT SERPL W P-5'-P-CCNC: 12 U/L (ref 0–50)
ANION GAP SERPL CALCULATED.3IONS-SCNC: 11 MMOL/L (ref 7–15)
AST SERPL W P-5'-P-CCNC: 14 U/L (ref 0–45)
BASOPHILS # BLD AUTO: 0.1 10E3/UL (ref 0–0.2)
BASOPHILS NFR BLD AUTO: 2 %
BILIRUB DIRECT SERPL-MCNC: 0.26 MG/DL (ref 0–0.3)
BILIRUB SERPL-MCNC: 0.5 MG/DL
BUN SERPL-MCNC: 21.1 MG/DL (ref 8–23)
CALCIUM SERPL-MCNC: 7.3 MG/DL (ref 8.8–10.2)
CHLORIDE SERPL-SCNC: 90 MMOL/L (ref 98–107)
CREAT SERPL-MCNC: 3.62 MG/DL (ref 0.51–0.95)
DEPRECATED HCO3 PLAS-SCNC: 22 MMOL/L (ref 22–29)
EGFRCR SERPLBLD CKD-EPI 2021: 13 ML/MIN/1.73M2
EOSINOPHIL # BLD AUTO: 0.2 10E3/UL (ref 0–0.7)
EOSINOPHIL NFR BLD AUTO: 6 %
ERYTHROCYTE [DISTWIDTH] IN BLOOD BY AUTOMATED COUNT: 17.5 % (ref 10–15)
GLUCOSE SERPL-MCNC: 71 MG/DL (ref 70–99)
HBV SURFACE AB SERPL IA-ACNC: <3.5 M[IU]/ML
HBV SURFACE AB SERPL IA-ACNC: NONREACTIVE M[IU]/ML
HBV SURFACE AG SERPL QL IA: NONREACTIVE
HCT VFR BLD AUTO: 29.4 % (ref 35–47)
HGB BLD-MCNC: 9.4 G/DL (ref 11.7–15.7)
IMM GRANULOCYTES # BLD: 0 10E3/UL
IMM GRANULOCYTES NFR BLD: 0 %
LYMPHOCYTES # BLD AUTO: 1.2 10E3/UL (ref 0.8–5.3)
LYMPHOCYTES NFR BLD AUTO: 36 %
MAGNESIUM SERPL-MCNC: 1.9 MG/DL (ref 1.7–2.3)
MCH RBC QN AUTO: 30 PG (ref 26.5–33)
MCHC RBC AUTO-ENTMCNC: 32 G/DL (ref 31.5–36.5)
MCV RBC AUTO: 94 FL (ref 78–100)
MONOCYTES # BLD AUTO: 0.4 10E3/UL (ref 0–1.3)
MONOCYTES NFR BLD AUTO: 13 %
NEUTROPHILS # BLD AUTO: 1.4 10E3/UL (ref 1.6–8.3)
NEUTROPHILS NFR BLD AUTO: 43 %
NRBC # BLD AUTO: 0 10E3/UL
NRBC BLD AUTO-RTO: 0 /100
PHOSPHATE SERPL-MCNC: 2.6 MG/DL (ref 2.5–4.5)
PLATELET # BLD AUTO: 202 10E3/UL (ref 150–450)
POTASSIUM SERPL-SCNC: 4.7 MMOL/L (ref 3.4–5.3)
PROT SERPL-MCNC: 4.1 G/DL (ref 6.4–8.3)
RBC # BLD AUTO: 3.13 10E6/UL (ref 3.8–5.2)
SODIUM SERPL-SCNC: 123 MMOL/L (ref 135–145)
WBC # BLD AUTO: 3.3 10E3/UL (ref 4–11)

## 2024-05-07 PROCEDURE — 999N000150 HC STATISTIC PT MED CONFERENCE < 30 MIN

## 2024-05-07 PROCEDURE — 99368 TEAM CONF W/O PAT BY HC PRO: CPT | Performed by: STUDENT IN AN ORGANIZED HEALTH CARE EDUCATION/TRAINING PROGRAM

## 2024-05-07 PROCEDURE — 250N000009 HC RX 250

## 2024-05-07 PROCEDURE — G0463 HOSPITAL OUTPT CLINIC VISIT: HCPCS | Mod: 25

## 2024-05-07 PROCEDURE — 87340 HEPATITIS B SURFACE AG IA: CPT | Performed by: INTERNAL MEDICINE

## 2024-05-07 PROCEDURE — 97535 SELF CARE MNGMENT TRAINING: CPT | Mod: GO | Performed by: STUDENT IN AN ORGANIZED HEALTH CARE EDUCATION/TRAINING PROGRAM

## 2024-05-07 PROCEDURE — 80053 COMPREHEN METABOLIC PANEL: CPT | Performed by: INTERNAL MEDICINE

## 2024-05-07 PROCEDURE — 90937 HEMODIALYSIS REPEATED EVAL: CPT

## 2024-05-07 PROCEDURE — 250N000013 HC RX MED GY IP 250 OP 250 PS 637: Performed by: PHYSICIAN ASSISTANT

## 2024-05-07 PROCEDURE — 250N000011 HC RX IP 250 OP 636: Performed by: PHYSICIAN ASSISTANT

## 2024-05-07 PROCEDURE — 99232 SBSQ HOSP IP/OBS MODERATE 35: CPT | Performed by: PHYSICIAN ASSISTANT

## 2024-05-07 PROCEDURE — 250N000011 HC RX IP 250 OP 636: Performed by: INTERNAL MEDICINE

## 2024-05-07 PROCEDURE — 85004 AUTOMATED DIFF WBC COUNT: CPT | Performed by: INTERNAL MEDICINE

## 2024-05-07 PROCEDURE — 634N000001 HC RX 634: Mod: JZ | Performed by: INTERNAL MEDICINE

## 2024-05-07 PROCEDURE — 84100 ASSAY OF PHOSPHORUS: CPT | Performed by: PHYSICIAN ASSISTANT

## 2024-05-07 PROCEDURE — 97530 THERAPEUTIC ACTIVITIES: CPT | Mod: GP | Performed by: REHABILITATION PRACTITIONER

## 2024-05-07 PROCEDURE — 82248 BILIRUBIN DIRECT: CPT | Performed by: PHYSICIAN ASSISTANT

## 2024-05-07 PROCEDURE — 258N000003 HC RX IP 258 OP 636: Performed by: INTERNAL MEDICINE

## 2024-05-07 PROCEDURE — 83735 ASSAY OF MAGNESIUM: CPT | Performed by: PHYSICIAN ASSISTANT

## 2024-05-07 PROCEDURE — 128N000003 HC R&B REHAB

## 2024-05-07 PROCEDURE — 86706 HEP B SURFACE ANTIBODY: CPT | Performed by: INTERNAL MEDICINE

## 2024-05-07 PROCEDURE — 36415 COLL VENOUS BLD VENIPUNCTURE: CPT | Performed by: PHYSICIAN ASSISTANT

## 2024-05-07 PROCEDURE — 90935 HEMODIALYSIS ONE EVALUATION: CPT

## 2024-05-07 PROCEDURE — 99232 SBSQ HOSP IP/OBS MODERATE 35: CPT | Mod: 24 | Performed by: INTERNAL MEDICINE

## 2024-05-07 RX ORDER — POLYETHYLENE GLYCOL 3350 17 G/17G
17 POWDER, FOR SOLUTION ORAL DAILY
Status: DISCONTINUED | OUTPATIENT
Start: 2024-05-07 | End: 2024-05-15 | Stop reason: HOSPADM

## 2024-05-07 RX ORDER — BUMETANIDE 1 MG/1
4 TABLET ORAL
Status: DISCONTINUED | OUTPATIENT
Start: 2024-05-08 | End: 2024-05-15 | Stop reason: HOSPADM

## 2024-05-07 RX ORDER — HEPARIN SODIUM 1000 [USP'U]/ML
500 INJECTION, SOLUTION INTRAVENOUS; SUBCUTANEOUS CONTINUOUS
Status: DISCONTINUED | OUTPATIENT
Start: 2024-05-07 | End: 2024-05-07

## 2024-05-07 RX ADMIN — GABAPENTIN 200 MG: 100 CAPSULE ORAL at 21:33

## 2024-05-07 RX ADMIN — Medication 1 CAPSULE: at 20:08

## 2024-05-07 RX ADMIN — HEPARIN SODIUM 5000 UNITS: 5000 INJECTION, SOLUTION INTRAVENOUS; SUBCUTANEOUS at 21:33

## 2024-05-07 RX ADMIN — POLYETHYLENE GLYCOL 3350 17 G: 17 POWDER, FOR SOLUTION ORAL at 13:38

## 2024-05-07 RX ADMIN — MICONAZOLE NITRATE: 20 POWDER TOPICAL at 09:26

## 2024-05-07 RX ADMIN — Medication 1 CAPSULE: at 09:19

## 2024-05-07 RX ADMIN — HEPARIN SODIUM 500 UNITS/HR: 1000 INJECTION INTRAVENOUS; SUBCUTANEOUS at 16:13

## 2024-05-07 RX ADMIN — AMLODIPINE BESYLATE 5 MG: 5 TABLET ORAL at 09:18

## 2024-05-07 RX ADMIN — FLUTICASONE FUROATE AND VILANTEROL TRIFENATATE 1 PUFF: 200; 25 POWDER RESPIRATORY (INHALATION) at 09:32

## 2024-05-07 RX ADMIN — SODIUM CHLORIDE 300 ML: 9 INJECTION, SOLUTION INTRAVENOUS at 16:13

## 2024-05-07 RX ADMIN — SENNOSIDES AND DOCUSATE SODIUM 1 TABLET: 8.6; 5 TABLET ORAL at 09:19

## 2024-05-07 RX ADMIN — EPOETIN ALFA-EPBX 4000 UNITS: 10000 INJECTION, SOLUTION INTRAVENOUS; SUBCUTANEOUS at 16:40

## 2024-05-07 RX ADMIN — CLOPIDOGREL BISULFATE 75 MG: 75 TABLET ORAL at 09:19

## 2024-05-07 RX ADMIN — SILVER SULFADIAZINE: 10 CREAM TOPICAL at 21:49

## 2024-05-07 RX ADMIN — SODIUM CHLORIDE 250 ML: 9 INJECTION, SOLUTION INTRAVENOUS at 16:12

## 2024-05-07 RX ADMIN — NICOTINE 1 PATCH: 14 PATCH, EXTENDED RELEASE TRANSDERMAL at 09:24

## 2024-05-07 RX ADMIN — HYDROMORPHONE HYDROCHLORIDE 2 MG: 2 TABLET ORAL at 10:53

## 2024-05-07 RX ADMIN — HYDROMORPHONE HYDROCHLORIDE 2 MG: 2 TABLET ORAL at 21:34

## 2024-05-07 RX ADMIN — CETIRIZINE HYDROCHLORIDE 5 MG: 5 TABLET ORAL at 09:18

## 2024-05-07 RX ADMIN — HEPARIN SODIUM 500 UNITS: 1000 INJECTION INTRAVENOUS; SUBCUTANEOUS at 16:13

## 2024-05-07 RX ADMIN — SENNOSIDES AND DOCUSATE SODIUM 2 TABLET: 8.6; 5 TABLET ORAL at 20:08

## 2024-05-07 RX ADMIN — CARVEDILOL 6.25 MG: 6.25 TABLET, FILM COATED ORAL at 09:19

## 2024-05-07 RX ADMIN — ROSUVASTATIN CALCIUM 10 MG: 5 TABLET, COATED ORAL at 20:09

## 2024-05-07 RX ADMIN — HEPARIN SODIUM 5000 UNITS: 5000 INJECTION, SOLUTION INTRAVENOUS; SUBCUTANEOUS at 09:19

## 2024-05-07 RX ADMIN — CARVEDILOL 6.25 MG: 6.25 TABLET, FILM COATED ORAL at 20:08

## 2024-05-07 RX ADMIN — MICONAZOLE NITRATE: 20 POWDER TOPICAL at 20:09

## 2024-05-07 ASSESSMENT — ACTIVITIES OF DAILY LIVING (ADL)
ADLS_ACUITY_SCORE: 39
ADLS_ACUITY_SCORE: 37
ADLS_ACUITY_SCORE: 39
ADLS_ACUITY_SCORE: 37
ADLS_ACUITY_SCORE: 37
ADLS_ACUITY_SCORE: 39
ADLS_ACUITY_SCORE: 37
ADLS_ACUITY_SCORE: 39
ADLS_ACUITY_SCORE: 37
ADLS_ACUITY_SCORE: 39
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
## 2024-05-07 NOTE — PROGRESS NOTES
Team rounds today. MD, PA, PT, OT, Therapy Supervisor, and SW met with pt, pt's sister, Yue, and pt's daughter, Malu. Provided medical and therapy updates. Discussed discharge options. Pt would likely not qualify for TCU LOC, so a discharge back to pt's rental home, a family member's home, or an alternative location is recommended. FMLA was brought up, but per pt's family, this is not an option. Pt's daughter works seasonally in the summer, and pt's sister is unable to take time off unpaid. Pt needs to be out of current rental by 5/27. No location known afterwards, pt's family is still waiting on a date pt can return to her home. Pt will be staying with her brother and son, but both work full time and would not be available 24/7. Pt's brother and son have also expressed they will not be assisting with incontinence care. Other family members have not offered any help. Discussed option of hiring in help in the interim and provided some pricing, but per pt's daughter, this is not an option financially. Asked about PCA services, JUANITA explained services are either private pay or paid by MA. Pt's family understandably frustrated with limited resources.     PCS also met with pt and her family, see today's note from Karyn Milligan. SW reached out to SW Supervisor to ask about additional resources.    DAWN Faith  Post Acute Float   ARU/TCU/CARLOS    Phone: [phone]  Fax: [phone]

## 2024-05-07 NOTE — PROGRESS NOTES
"Writer met with patient and her sister today regarding some care concerns, and a larger discussion about discharge plans.     Patient and sister have concerns about discharging home on Sunday, 5/12. Patient's sister states that there is not a ramp in place and she is worried about \"bumping\" patient down stairs. They report that they have three stairs to get out of the home. Per JUANITA, therapy sent information on installing a ramp earlier in patient stay and again today.     They also have concerns about patient's spouse moving back into the home with patient. Patient was the caregiver for her spouse who reportedly has a history of ETOH, blindness, multiple falls at home and utilizes a wheelchair for mobility. Writer discussed the idea of a home care  with patient and she is open to adding that resource. Writer updated JUANTIA. Patient will need HC PT/OT/RN/HHA/JUANITA.     Regarding caregiver training, patient's brother and son live with patient. They are willing to assist patient with all cares except for lydia/incontinence cares. They are available to assist with dressing, meals, wound care, etc. Patient's brother, Alan, was called while writer was in the room and he is available to come in for family training on 5/9 at 8:30am. He will also learn how to change patient's residual limb dressing that morning. Patient's son, FERNANDEZ, will be picking her up on the day of discharge, and will need to be trained on car transfers. Therapy aware of information above.     They are also concerned about outpatient dialysis and transport to Dialysis. [facility] assisted patient with [employer] application last week, which was sent in. They are aware that  is working on scheduling outpatient dialysis. They do think that MWF will be the best schedule and they would prefer to attend dialysis in [place].     Patient's sister would like update from  regarding home care services and other resources. Writer updated JUANTIA about this and " conversation above.     Karyn Milligan, RN, BSN, CRRN  ARC Patient Care Supervisor  Acute Rehabilitation Unit  PH: [phone]  Pager: [phone]

## 2024-05-07 NOTE — PLAN OF CARE
Goal Outcome Evaluation:      Plan of Care Reviewed With: patient    Overall Patient Progress: improvingOverall Patient Progress: improving    Pt alert and oriented x4, able to make needs known and call light within range. On gluten free, regular texture/ thin/ whole with fair appetite for dinner during the shift. Pt C/o abdominal tenderness and bloated, bowel sound auscultated to all quads but slightly distended, Had order for enema after X-ray report showed non-obstructive bowel gas with stool burden. Pt reported of passing gas, and soap david enema administered instead of tap water enema per physician order. Small loose BM was passed. Assist x 1 for chair/ bed transfer with sliding board/ sera steady. PRN dilaudid was used for stump pain and was effective.     Pt slept most of the night, safety checks done and bed alarm was on. Had BM x 1 medium this morning, pt reported of bloating/ abdominal tenderness getting better.

## 2024-05-07 NOTE — PLAN OF CARE
Discharge Planner Post-Acute Rehab OT:      Discharge Plan: home with assist and HH OT      Precautions: fall, NWB of RLE, dialysis, sacral wound, elevate residual limb when in bed     Current Status:  ADLs:  Mobility: mod I for slide board to WC from bed  Grooming: IND seated at sink in w/c  Dressing: UB: IND, LB: IND in supine, IND for brace and shoe  Bathing: TBD  Toileting: SB to commode Ax1, assit for lydia cares and clothing depending on continence  IADLs: Pt does most IADL including caregiving for .   Vision/Cognition: intact     Assessment: Pt still unable to pull up pants from seated position for toileting clothing management. Working on WC based activity IADL as well as ADL.     Other Barriers to Discharge (DME, Family Training, etc): DME, family training, pt has support from multiple family members and will need to schedule family training since she will most likely still require assist with toileting.     5/4 Pt states her H is staying with his sister and she hopes this will con't.  She reports her brother will help her but she would like to be IND with toileting as he is not likely to assist her with toileting.

## 2024-05-07 NOTE — PROGRESS NOTES
[facility] Nurse Inpatient Assessment     Consulted for: Coccyx    Patient History (according to provider note(s):      Per Corrina Hurt PA-C on 4/22/2024: Shirley Hendricks is a 63 year old right hand dominant female with complicated past medical history including but not limited to PAD with multiple prior bilateral lower extremity vascular bypass grafts and thrombectomies (most recently 10/2023 right common femoral to proximal anterior tibial PTFE bypass graft), bilateral Charcot-Breonna-Tooth foot deformity, prior bilateral carotid endarterectomies, B-cell lymphoma, CAD (hx Mix3), hypertension, hyperlipidemia, GI bleed (12/2023), type 2 diabetes mellitus, COPD, tobacco use disorder, iron deficiency anemia, GERD, Celiac disease, Takotsubo cardiomyopathy, SVT, depression/anxiety and spongiotic dermatitis who was admitted on 3/29/24 with acute occlusion of right lower extremity arterial bypass graft now s/p right above-knee amputation 4/1/24, completed 4/9/24 with hospital course complicated by acute renal failure now on dialysis, sepsis, acute blood loss anemia, acute post-operative pain, nausea, loose stools, delirium, malnutrition, and multiple electrolyte derangements.  She is now admitted to ARU on 4/22/24 for multidisciplinary rehabilitation and ongoing medical management.        Admission to acute inpatient rehab 04/22/24.      Assessment:      Areas visualized during today's visit: Coccyx, buttocks    Pressure Injury Location: Sacroccocygeal            5/7    Last photo: 5/7/24  Wound type: Pressure Injury, Incontinence Associated Dermatitis (IAD), and Moisture Associated Skin Damage (MASD)     Pressure Injury Stage: Unstageable, present on admission (from recent admission at [facility]).      Wound history/plan of care:  MASD related to incontinence of bladder and bowels. intergluteal crease with area of linear erythema, skin is intact. This line of erythema continues  "up gluteal crease to sacral area and evolves to a localized round area of erythema that is blanchable. To the center of this area is an open pressure related injury stage 2 vs 3, unable to determine staging at this time, area is positional over bone and within skin crevice, very little adipose tissue to this area. Base of wound with dermis to edges and yellow tissue. Patient found with brief on in bed, discussed indication for brief use, she agreed to remove due to \" I don't even want to use them but I can't get up because of my leg\". Patient has Right AKA, discussed use of bedpan and/or transferring to commode if able, she was agreeable to try this plan.   4/30: pt prefers to have mepilex in place vs paste for when he is down at therapy. Will change orders and re-evaluate at next visit. Spent much time discussing importance of repositioning q2h   5/6: Wound is undermining, indicating a full thickness wound. Base is quite small with mostly slough at the base and granulation tissue in the undermining aspects.     Wound base: 100 % slough in center, granulation tissue at edges     Palpation of the wound bed: normal      Drainage: none     Description of drainage: none     Measurements (length x width x depth, in cm) 1 x 0.5  x  0.1 cm, undermining 0.2 cm circumferentially     Periwound skin: Intact and Erythema- blanchable      Color: normal and consistent with surrounding tissue      Temperature: normal   Odor: none  Pain: denies , none  Pain intervention prior to dressing change: patient tolerated well, no significant pain present , soaking, and slow and gentle cares   Treatment goal: Heal , Infection control/prevention, Maintain (prevention of deterioration), Protection, and Promote epidermal migration  STATUS:  follow up  Supplies ordered: at bedside, supplies stored on unit, discussed with RN, and discussed with patient     My PI Risk Assessment     Sensory Perception: 4 - No impairment     Moisture: 3 - " "Occasionally moist      Activity: 2 - Chairfast     Mobility: 3 - Slightly limited      Nutrition: 3 - Adequate     Friction/Shear: 1 - Problem     TOTAL: 16      Treatment Plan:     Sacral wound(s): Every 3 days and PRN if soiled  Cleanse the area with NS and pat dry.  Apply No sting film barrier to periwound skin.  Cover wound with Sacral Mepilex (#239347)- fold like a taco with pointed end towards head for best fit  Ensure pt has Ritchie-cushion (#150408) while sitting up in the chair.  Use only one Covidien pad in between mattress and pt.   No brief while in bed.  Add Low air loss pump to Isoflex support surface and rotate side to side when in bed  Strict PIP measures  FYI- If pt has constant incontinent loose stools needing dressing changes Q shift please discontinue the Mepilex dressing and apply criticaid barrier paste BID and PRN.      Pressure Injury Prevention (PIP) Plan:  If patient is declining pressure injury prevention interventions: Explore reason why and address patient's concerns, Educate on pressure injury risk and prevention intervention(s), If patient is still declining, document \"informed refusal\" , and Ensure Care team is aware ( provider, charge nurse, etc)  Mattress: Follow bed algorithm, reassess daily and order specialty mattress, if indicated.  HOB: Maintain at or below 30 degrees, unless contraindicated  Repositioning in bed: Every 1-2 hours , Left/right positioning; avoid supine, Raise foot of bed prior to raising head of bed, to reduce patient sliding down (shear), and Frequent microturns using TAPS wedges, as patient tolerates  Heels: Keep elevated off mattress and Pillows under calves  Protective Dressing: Sacral Mepilex for prevention (#852371),  especially for the agitated patient   Positioning Equipment: TAPS wedges (#682339) to help maintain 30 degree side lying position   Chair positioning: Chair cushion (#[id number]) , Assist patient to reposition hourly, and Do NOT use a donut for " sitting (this increases pressure to smaller area and creates a higher potential for injury)    If patient has a buttock pressure injury, or high risk for PI use chair cushion or SPS.  Moisture Management: Perineal cleansing /protection: Follow Incontinence Protocol, Avoid brief in bed, Clean and dry skin folds with bathing , Consider InterDry (#003303) between folds, and Moisturize dry skin  Under Devices: Inspect skin under all medical devices during skin inspection , Ensure tubes are stabilized without tension, and Ensure patient is not lying on medical devices or equipment when repositioned  Ask provider to discontinue device when no longer needed.      Orders: Reviewed    RECOMMEND PRIMARY TEAM ORDER: None, at this time  Education provided: importance of repositioning, plan of care, wound progress, Infection prevention , Moisture management, Hygiene, and Off-loading pressure  Discussed plan of care with: Patient and Nurse  WOC nurse follow-up plan: twice weekly  Notify WOC if wound(s) deteriorate.  Nursing to notify the Provider(s) and re-consult the WOC Nurse if new skin concern.    DATA:     Current support surface: Standard  Standard gel/foam mattress (IsoFlex, Atmos air, etc)  Containment of urine/stool: Incontinence Protocol, Brief, Incontinent pad in bed, and recommending NICOLE pump be added to isolflex support surface for moisture management  BMI: Body mass index is 23.63 kg/m .   Active diet order: Orders Placed This Encounter      Gluten Free Diet     Output: I/O last 3 completed shifts:  In: 580 [P.O.:580]  Out: -      Labs:   Recent Labs   Lab 05/07/24  0553   ALBUMIN 1.9*   HGB 9.4*   WBC 3.3*     Pressure injury risk assessment:   Sensory Perception: 3-->slightly limited  Moisture: 3-->occasionally moist  Activity: 2-->chairfast  Mobility: 2-->very limited  Nutrition: 2-->probably inadequate  Friction and Shear: 2-->potential problem  Cesar Score: 14      Becky Yadav RN, CWOCN  Available via [employer] [facility]  baljinder: [place] [facility]  Office *[phone]

## 2024-05-07 NOTE — PLAN OF CARE
Goal Outcome Evaluation:      Plan of Care Reviewed With: patient    Overall Patient Progress: improvingOverall Patient Progress: improving  Pt remains alert and oriented x 4 and able to make her needs known, she took all her morning medications after therapy with no difficulties, denies nausea, requested for dilaudid for abdominal and leg pain, was effective, remains on regular diet thin liquids, takes medications whole with water, incontinent with bladder,  had 2 small loose stools this morning. Will continue plan of care

## 2024-05-07 NOTE — PROGRESS NOTES
Date: 5/7/2024  Time: 1931     Data:  Pre Wt:   56.7 kg  Desired Wt: To be established  Post Wt:  54.2 kg   Weight change: - 2.5 kg  Ultrafiltration - Post Run Net Total Removed (mL):  2500 ml  Vascular Access Status: CVC patent  Dialyzer Rinse:  Light  Total Blood Volume Processed: 59.1 L   Total Dialysis (Treatment) Time:   3 Hrs  Dialysate Bath: K 2, Ca 3  Heparin: Heparin 500 units loading + 500 units/hr     Lab:   Yes HbsAg  HbsAb     Interventions:  Dialysis done through Right  CVC  UF set to 3.3 Liters of fluid removal, accommodating priming and rinse back volumes. Decreased uf goal decreased to 2.5 net. Bp decreasing. , . All meds administered per MAR. Warm [place] provided for comfort. Treatment ended safely and  blood is rinsed back completely to a salmon color in HD lines.Catheter lumens flushed  and locked with Saline, catheter caps (ClearGuard ) changed post HD.Report given to Lluvia COPELAND RN sent back to [place] [id number] room in stable condition     Assessment:  A/O x 4, calm & cooperative, denies pain  Lung sounds clear  anterior and lateral BUL, Diminished BLL  CVC intact, previous dressing clean and dry                Plan:    Per Renal team        Cinthia SANTANAN, RN  Acute Dialysis RN  [facility] [place]

## 2024-05-07 NOTE — PROGRESS NOTES
"CLINICAL NUTRITION SERVICES - REASSESSMENT NOTE     Nutrition Prescription    RECOMMENDATIONS FOR MDs/PROVIDERS TO ORDER:  Appreciate continued encouragement surrounding PO intake    Malnutrition Status:    Severe malnutrition in the context of acute illness.     Recommendations already ordered by Registered Dietitian (RD):  Continue snacks/supplements PRN    Future/Additional Recommendations:  Monitor PO intake, need for scheduled supplements, labs, and weight trends     EVALUATION OF THE PROGRESS TOWARD GOALS   Diet: Gluten Free and 1000 mL Fluid Restriction  - Room service with assist    Snacks/supplements: PRN    Intake: 25-75% per flowsheets over past week, most intakes 50%       NEW FINDINGS   - Per PA note 5/6, \"She notes ongoing frequent bowel movements, which are still loose, but \"not as much\" as previously. She states the frequency has been variable. She is continuing to use gluten-free diet, though feels she is eating less because of this and dislike for hospital foods.\"  - Unable to see pt today as she is off the unit at dialysis. Will follow up as able.    Weight:   05/07/24 0500 56.7 kg (125 lb) Bed scale   05/06/24 1638 56.4 kg (124 lb 5.4 oz) Bed scale   05/04/24 1100 52.3 kg (115 lb 4.8 oz) Bed scale   05/03/24 0115 50 kg (110 lb 3.7 oz) --   04/28/24 0100 51.8 kg (114 lb 3.2 oz) Bed scale   04/27/24 0400 51.8 kg (114 lb 3.2 oz) Bed scale   04/26/24 2340 51.1 kg (112 lb 10.5 oz) Bed scale   04/25/24 0918 56.1 kg (123 lb 10.9 oz) Bed scale   04/23/24 0808 55.8 kg (123 lb 0.3 oz) Bed scale   04/22/24 1350 59.1 kg (130 lb 4.7 oz) Bed scale   Difficult to assess weight trends with fluid shifts from HD    Labs:   Na: 123 (L)  Cr: 3.62 (H)    Meds:  Bumex, Sun/Mon/Wed/Fri  Pepcid, every 48 hrs  Culturell, BID  Renal MVI  Senna-docusate, BID    GI: 3 BMs 5/7 per I/Os, loose  3 BMs 5/6, 5 BMs 5/5    Renal: LIMA requiring HD    Skin: Cesar 14  WOCN following for coccyx   Wound type: Pressure Injury, " Incontinence Associated Dermatitis (IAD), and Moisture Associated Skin Damage (MASD)     Pressure Injury Stage: Unstageable, hospital acquired from recent admission at [facility].   See last note 5/7 for details     MALNUTRITION  % Intake: < 75% for > 7 days (moderate)  % Weight Loss: Unable to assess  Subcutaneous Fat Loss: Facial region: moderate and Upper arm: moderate - per RD note 4/24   Muscle Loss: Temporal: moderate, Facial & jaw region: moderate, Thoracic region (clavicle, acromium bone, deltoid, trapezius, pectoral): moderate, Upper arm (bicep, tricep): moderate, and Lower arm  (forearm): moderate - per RD note 4/24   Fluid Accumulation/Edema: Trace - Mild  Malnutrition Diagnosis: Severe malnutrition in the context of acute illness.     Previous Goals   Patient to consume % of nutritionally adequate meal trays TID, or the equivalent with supplements/snacks.   Evaluation: Not met    Previous Nutrition Diagnosis  Inadequate oral intake related to nausea, loose stools, dislike of GF diet as evidenced by chart review and discussion in rounds.   Evaluation: Modified    CURRENT NUTRITION DIAGNOSIS  Inadequate oral intake related to dislike of GF diet and hospital food and increased protein needs as evidenced by chart review/pt report, and est protein needs of 1.3-1.8 g/kg for wound healing and HD.     INTERVENTIONS  Implementation  Medical food supplement therapy - PRN     Goals  Patient to consume % of nutritionally adequate meal trays TID, or the equivalent with supplements/snacks.     Monitoring/Evaluation  Progress toward goals will be monitored and evaluated per protocol.     Marleen Mccloud RD, LD  Available via phone and [employer]  Phone: [phone]  Vocera: 5R Acute Rehab Clinical Dietitian  Weekend/Holiday Vocera: Weekend Holiday Clinical Dietitian [Multi Site Groups]
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
## 2024-05-08 ENCOUNTER — APPOINTMENT (OUTPATIENT)
Dept: PHYSICAL THERAPY | Facility: CLINIC | Age: 64
DRG: 559 | End: 2024-05-08
Attending: PHYSICAL MEDICINE & REHABILITATION
Payer: COMMERCIAL

## 2024-05-08 ENCOUNTER — APPOINTMENT (OUTPATIENT)
Dept: OCCUPATIONAL THERAPY | Facility: CLINIC | Age: 64
DRG: 559 | End: 2024-05-08
Attending: PHYSICAL MEDICINE & REHABILITATION
Payer: COMMERCIAL

## 2024-05-08 LAB
ALBUMIN SERPL BCG-MCNC: 1.9 G/DL (ref 3.5–5.2)
ANION GAP SERPL CALCULATED.3IONS-SCNC: 9 MMOL/L (ref 7–15)
BUN SERPL-MCNC: 11.1 MG/DL (ref 8–23)
CALCIUM SERPL-MCNC: 7.4 MG/DL (ref 8.8–10.2)
CHLORIDE SERPL-SCNC: 96 MMOL/L (ref 98–107)
CREAT SERPL-MCNC: 2.66 MG/DL (ref 0.51–0.95)
DEPRECATED HCO3 PLAS-SCNC: 24 MMOL/L (ref 22–29)
EGFRCR SERPLBLD CKD-EPI 2021: 19 ML/MIN/1.73M2
GLUCOSE SERPL-MCNC: 79 MG/DL (ref 70–99)
MAGNESIUM SERPL-MCNC: 1.9 MG/DL (ref 1.7–2.3)
PHOSPHATE SERPL-MCNC: 2.5 MG/DL (ref 2.5–4.5)
POTASSIUM SERPL-SCNC: 4.2 MMOL/L (ref 3.4–5.3)
SODIUM SERPL-SCNC: 129 MMOL/L (ref 135–145)

## 2024-05-08 PROCEDURE — 99231 SBSQ HOSP IP/OBS SF/LOW 25: CPT | Mod: 24 | Performed by: INTERNAL MEDICINE

## 2024-05-08 PROCEDURE — 83735 ASSAY OF MAGNESIUM: CPT | Performed by: PHYSICAL MEDICINE & REHABILITATION

## 2024-05-08 PROCEDURE — 36415 COLL VENOUS BLD VENIPUNCTURE: CPT | Performed by: PHYSICAL MEDICINE & REHABILITATION

## 2024-05-08 PROCEDURE — 250N000011 HC RX IP 250 OP 636: Performed by: PHYSICIAN ASSISTANT

## 2024-05-08 PROCEDURE — 97535 SELF CARE MNGMENT TRAINING: CPT | Mod: GO

## 2024-05-08 PROCEDURE — 250N000013 HC RX MED GY IP 250 OP 250 PS 637: Performed by: PHYSICAL MEDICINE & REHABILITATION

## 2024-05-08 PROCEDURE — 128N000003 HC R&B REHAB

## 2024-05-08 PROCEDURE — 97530 THERAPEUTIC ACTIVITIES: CPT | Mod: GP

## 2024-05-08 PROCEDURE — 80069 RENAL FUNCTION PANEL: CPT | Performed by: PHYSICIAN ASSISTANT

## 2024-05-08 PROCEDURE — 250N000013 HC RX MED GY IP 250 OP 250 PS 637: Performed by: PHYSICIAN ASSISTANT

## 2024-05-08 RX ORDER — AMLODIPINE BESYLATE 10 MG/1
10 TABLET ORAL DAILY
Status: DISCONTINUED | OUTPATIENT
Start: 2024-05-08 | End: 2024-05-15 | Stop reason: HOSPADM

## 2024-05-08 RX ADMIN — SILVER SULFADIAZINE: 10 CREAM TOPICAL at 19:34

## 2024-05-08 RX ADMIN — LEVOFLOXACIN 500 MG: 500 TABLET, FILM COATED ORAL at 09:19

## 2024-05-08 RX ADMIN — CETIRIZINE HYDROCHLORIDE 5 MG: 5 TABLET ORAL at 09:18

## 2024-05-08 RX ADMIN — FAMOTIDINE 20 MG: 20 TABLET ORAL at 15:45

## 2024-05-08 RX ADMIN — MICONAZOLE NITRATE: 20 POWDER TOPICAL at 21:01

## 2024-05-08 RX ADMIN — Medication 1 CAPSULE: at 20:59

## 2024-05-08 RX ADMIN — CLOPIDOGREL BISULFATE 75 MG: 75 TABLET ORAL at 09:18

## 2024-05-08 RX ADMIN — POLYETHYLENE GLYCOL 3350 17 G: 17 POWDER, FOR SOLUTION ORAL at 09:21

## 2024-05-08 RX ADMIN — HEPARIN SODIUM 5000 UNITS: 5000 INJECTION, SOLUTION INTRAVENOUS; SUBCUTANEOUS at 20:58

## 2024-05-08 RX ADMIN — MICONAZOLE NITRATE: 20 POWDER TOPICAL at 09:21

## 2024-05-08 RX ADMIN — Medication 1 CAPSULE: at 09:19

## 2024-05-08 RX ADMIN — CARVEDILOL 6.25 MG: 6.25 TABLET, FILM COATED ORAL at 09:19

## 2024-05-08 RX ADMIN — Medication 1 CAPSULE: at 12:00

## 2024-05-08 RX ADMIN — NICOTINE 1 PATCH: 14 PATCH, EXTENDED RELEASE TRANSDERMAL at 09:19

## 2024-05-08 RX ADMIN — SENNOSIDES AND DOCUSATE SODIUM 1 TABLET: 8.6; 5 TABLET ORAL at 09:19

## 2024-05-08 RX ADMIN — SENNOSIDES AND DOCUSATE SODIUM 1 TABLET: 8.6; 5 TABLET ORAL at 20:58

## 2024-05-08 RX ADMIN — CARVEDILOL 6.25 MG: 6.25 TABLET, FILM COATED ORAL at 17:36

## 2024-05-08 RX ADMIN — BUMETANIDE 4 MG: 1 TABLET ORAL at 09:20

## 2024-05-08 RX ADMIN — HEPARIN SODIUM 5000 UNITS: 5000 INJECTION, SOLUTION INTRAVENOUS; SUBCUTANEOUS at 09:19

## 2024-05-08 RX ADMIN — FLUTICASONE FUROATE AND VILANTEROL TRIFENATATE 1 PUFF: 200; 25 POWDER RESPIRATORY (INHALATION) at 09:19

## 2024-05-08 RX ADMIN — ROSUVASTATIN CALCIUM 10 MG: 5 TABLET, COATED ORAL at 20:59

## 2024-05-08 ASSESSMENT — ACTIVITIES OF DAILY LIVING (ADL)
ADLS_ACUITY_SCORE: 39

## 2024-05-08 NOTE — PLAN OF CARE
"FOCUS/GOAL  Skin integrity, Psychosocial needs, Safety management, Prevention of secondary complications, and Adjustment to lifestyle change    ASSESSMENT, INTERVENTIONS AND CONTINUING PLAN FOR GOAL:    Goal Outcome Evaluation:      Plan of Care Reviewed With: patient    Overall Patient Progress: no changeOverall Patient Progress: no change    Outcome Evaluation: Pt AOx4. Denies pain this shift. Reprots that today has been \"a tough day.\" Expressed frustration and sadness with recent losses over last 2 years and \"needed a day to cry.\" Reg/thin. Incont fo both. 1 Lashon FR. Needs in reach and safety measures in place.      "

## 2024-05-08 NOTE — PLAN OF CARE
Goal Outcome Evaluation:    Overall Patient Progress: no changeOverall Patient Progress: no change    Patient had no complaints tonight. Not in respiratory distress or in pain. Appears sleeping during safety rounds. Remains as Mod I slideboard from bed to chair. A1 to the bsc. On a fluid restriction of 1000ml/day. No expressed needs. Continue poc.

## 2024-05-08 NOTE — PLAN OF CARE
Discharge Planner Post-Acute Rehab OT:      Discharge Plan: home with assist and HH OT      Precautions: fall, NWB of RLE, dialysis, sacral wound, elevate residual limb when in bed     Current Status:  ADLs:  Mobility: mod I for slide board to WC from bed  Grooming: IND seated at sink in w/c  Dressing: UB: IND, LB: IND in supine, IND for brace and shoe  Bathing: TBD  Toileting: SB to commode Ax1, assit for lydia cares and clothing depending on continence  IADLs: Pt does most IADL including caregiving for .   Vision/Cognition: intact     Assessment: Increased anxiety regarding going to home environment, continued to verbalize she is not ready. Increased verbal encouragement provided to reduce anxiety and increase participation during AM and PM sessions.     Other Barriers to Discharge (DME, Family Training, etc): DME, family training, pt has support from multiple family members and will need to schedule family training since she will most likely still require assist with toileting.     5/4 Pt states her H is staying with his sister and she hopes this will con't.  She reports her brother will help her but she would like to be IND with toileting as he is not likely to assist her with toileting.

## 2024-05-08 NOTE — PLAN OF CARE
Goal Outcome Evaluation: Ongoing     Plan of Care Reviewed With: patient     Overall Patient Progress: improvingOverall Patient Progress: improving     Pt alert and oriented x4, able to make needs known and call light within range. On gluten free, regular texture/ thin/ whole with fair appetite during the shift. Assist x 1 for chair/ bed transfer with sliding board/ yaa steady. Pt incontinent to bowel x 2 during the shift. Shirley is frustrated with frequent stool ing. Was in tears this morning.  Discussed with with resident. We will continue to do current laxatives. Enema tonight and recheck xray tomorrow. She did reluctantly agree with the plan.     Shirley is anxious about her upcoming discharge. Is not feeling like she can go home. Reassured. Ashley GRANT is aware and will touch base with her tomorrow. May benefit from a mental health consult.

## 2024-05-08 NOTE — PROGRESS NOTES
Nephrology Progress Note  05/07/2024         Assessment & Recommendations:   Shirley Hendricks is a 63 year old year old with peripheral artery disease, coronary artery disease and history of MI, hypertension, diabetes mellitus type 2 who is admitted to ARU following hospitalization for occlusion of right LE bypass graft managed with right above-the-knee amputation and complication of acute kidney injury requiring dialysis.     #Acute kidney injury: multifactorial (use of IV contrast 3 days in a row for evaluation and management of her occluded bypass graft, ischemia and possibly rhabdomyolysis) Creatinine level was 0.7 mg/dL prior to this episode. No urine studies available prior to LIMA to assess baseline proteinuria.  -First dialysis run 4/3/2024.  -Access tunneled CVC initially placed 4/3/2024 and revised on 4/15/2024.  - Pursue dialysis Tuesday, Thursday, Saturday for now but continue to watch closely for recovery  - Please obtain daily renal panel to assess signs of recovery of kidney function.   Her Cr is increased by < 1 mg/dl compared to predialysis on 5/4.  This, however, does not allow for a good assessment of the rate of rise without dialysis.     - had been receiving hemodialysis at [facility] and consents to ongoing hemodialysis while at [facility].     #Hyponatremia:   - Patient reports adhering to fluid restriction  - please continue to minimize shifts in serum Na with each dialysis     # Systolic Blood pressure remains suboptimal. Relative bradycardia on carvedilol 6.25 mg twice daily.   On amlodipine 5 mg daily and bumetanide 4 mg on non-dialysis days  #PAD/PVD  #Right common femoral artery to the mid anterior tibial artery bypass graft acute occlusion  #Right above-the-knee amputation 4/9/2024  - Plavix 75 mg daily (previously on Xarelto but no ongoing indication)     #Anemia due to acute blood loss with retroperitoneal hematoma and history of GI bleeding in December 2023.  - retacrit 10K  "units three times weekly with HD  -Received iron sucrose on 4/30     #Coronary artery disease with history of myocardial infarction x 3  #History of Takotsubo cardiomyopathy  Recommend increasing amlodipine to 10 mg daily (patient with relative bradycardia).       #Diabetes mellitus type 2     #Stage Mark marginal zone B-cell lymphoma  - Undergoing surveillance with [place] [facility]     Recommendations were communicated to primary team via note    I spent a total of 35 minutes in reviewing the medical records, face-to-face evaluation and physical exam, review of labs, counseling, orders, care coordination and documentation      Case Cohen MD   Division of Renal Disease and Hypertension  Am[employer]  Vocera Web Console    Interval History : May 7, 2024    Nursing and provider notes from last 24 hours reviewed.  Patient seen and examined this afternoon during her dialysis.  Patient reported feeling better.  No acute complaints.  Denied CP, SOB, pain.  No N/V  Reports voiding some urine. Volume not recorded.    Physical Exam:   I/O last 3 completed shifts:  In: 505 [P.O.:505]  Out: -    BP (!) 169/74 (BP Location: Left arm, Cuff Size: Adult Regular)   Pulse 64   Temp 98.7  F (37.1  C) (Oral)   Resp 15   Ht 1.549 m (5' 0.98\")   Wt 56.7 kg (125 lb)   LMP  (LMP Unknown)   SpO2 98%   BMI 23.63 kg/m       GENERAL APPEARANCE: no distress  EYES:  no scleral icterus, pupils equal  Mouth: dry mucosa  PULM: normal work of breathing.  Lungs clear to lat auscultation  CV: RRR. No rub, gallop  1+ left foot edema  R AKA  INTEGUMENT: no cyanosis, no rash  NEURO:  speech fluent, face symmetric    Labs:   All labs reviewed by me  Electrolytes/Renal -   Recent Labs   Lab Test 05/07/24  0553 05/06/24  0614 05/05/24  2159 05/05/24  0642 05/04/24  0249 05/02/24  0551   *  --   --   --  126* 122*   POTASSIUM 4.7  --   --   --  4.0 4.5   CHLORIDE 90*  --   --   --  92* 90*   CO2 22  --   --   --  24 23   BUN 21.1  --   --   -- "  17.8 25.3*   CR 3.62*  --   --   --  2.98* 3.74*   GLC 71  --   --   --  80 81   SONIA 7.3*  --   --   --  7.4* 7.3*   MAG 1.9 2.1 2.0   < > 1.8 1.5*   PHOS 2.6  --   --   --  2.9 3.2    < > = values in this interval not displayed.       CBC -   Recent Labs   Lab Test 05/07/24  0553 05/02/24  0551 05/01/24  0925   WBC 3.3* 3.6* 3.9*   HGB 9.4* 8.7* 9.3*    223 222       LFTs -   Recent Labs   Lab Test 05/07/24  0553 04/30/24  0653 04/23/24  0602   ALKPHOS 90 100 96   BILITOTAL 0.5 0.4 0.5   ALT 12 24 20   AST 14 21 16   PROTTOTAL 4.1* 4.3* 4.1*   ALBUMIN 1.9* 2.0*  2.0* 1.9*       Iron Panel -   Recent Labs   Lab Test 04/10/24  0559 04/09/24  1817 01/08/24  1542 12/11/23  0550   IRON 22*  --  71 122   IRONSAT 18  --  17 32   BRYN  --  609* 25 23       Current Medications:  Current Facility-Administered Medications   Medication Dose Route Frequency Provider Last Rate Last Admin    amLODIPine (NORVASC) tablet 5 mg  5 mg Oral Daily Corrina Hurt PA-C   5 mg at 05/07/24 0918    [START ON 5/8/2024] bumetanide (BUMEX) tablet 4 mg  4 mg Oral Once per day on Sunday Monday Wednesday Friday Corrina Hurt PA-C        carvedilol (COREG) tablet 6.25 mg  6.25 mg Oral BID w/meals Corrina Hurt PA-C   6.25 mg at 05/07/24 0919    cetirizine (zyrTEC) tablet 5 mg  5 mg Oral Daily Corrina Hurt PA-C   5 mg at 05/07/24 0918    clopidogrel (PLAVIX) tablet 75 mg  75 mg Oral Daily Corrina Hurt PA-C   75 mg at 05/07/24 0919    famotidine (PEPCID) tablet 20 mg  20 mg Oral Q48H Corrina Hurt PA-C   20 mg at 05/06/24 1633    fluticasone-vilanterol (BREO ELLIPTA) 200-25 MCG/ACT inhaler 1 puff  1 puff Inhalation Daily Corrina Hurt PA-C   1 puff at 05/07/24 0932    gabapentin (NEURONTIN) capsule 200 mg  200 mg Oral Once per day on Tuesday Thursday Saturday Corrina Hurt PA-C   200 mg at 05/04/24 2152    heparin ANTICOAGULANT injection 5,000 Units  5,000 Units Subcutaneous Q12H  Corrina Hurt PA-C   5,000 Units at 05/07/24 0919    lactobacillus rhamnosus (GG) (CULTURELL) capsule 1 capsule  1 capsule Oral BID Corrina Hurt PA-C   1 capsule at 05/07/24 0919    levofloxacin (LEVAQUIN) tablet 500 mg  500 mg Oral Every Other Day Maya Watkins MD   500 mg at 05/06/24 0925    miconazole (MICATIN) 2 % powder   Topical BID Corrina Hurt PA-C   Given at 05/07/24 0926    multivitamin RENAL (TRIPHROCAPS) capsule 1 capsule  1 capsule Oral Daily Corrina Hurt PA-C   1 capsule at 05/07/24 0919    nicotine (NICODERM CQ) 14 MG/24HR 24 hr patch 1 patch  1 patch Transdermal Daily Corrina Hurt PA-C   1 patch at 05/07/24 0924    polyethylene glycol (MIRALAX) Packet 17 g  17 g Oral Daily Corrina Hurt PA-C   17 g at 05/07/24 1338    rosuvastatin (CRESTOR) tablet 10 mg  10 mg Oral At Bedtime Corrina Hurt PA-C   10 mg at 05/06/24 2048    senna-docusate (SENOKOT-S/PERICOLACE) 8.6-50 MG per tablet 1-2 tablet  1-2 tablet Oral BID Corrina Hurt PA-C   1 tablet at 05/07/24 0919    silver sulfADIAZINE (SILVADENE) 1 % cream   Topical Daily Gala Lo MD   Given at 05/06/24 2155    sodium chloride (PF) 0.9% PF flush 9 mL  9 mL Intracatheter During Dialysis/CRRT (from stock) Soo Mejia MD        sodium chloride (PF) 0.9% PF flush 9 mL  9 mL Intracatheter During Dialysis/CRRT (from stock) Soo Mejia MD         Current Facility-Administered Medications   Medication Dose Route Frequency Provider Last Rate Last Admin    heparin 10,000 units/10 mL infusion (DIALYSIS USE)  500 Units/hr Hemodialysis Machine Continuous Case Cohen MD 0.5 mL/hr at 05/07/24 1613 500 Units/hr at 05/07/24 1613    heparin 10,000 units/10 mL infusion (DIALYSIS USE)  500 Units/hr Hemodialysis Machine Continuous Soo Mejia MD 0.5 mL/hr at 05/04/24 1200 500 Units/hr at 05/04/24 1200     Case Cohen MD

## 2024-05-08 NOTE — PLAN OF CARE
Discharge Plan: home with HH PT      Precautions: fall, NWB of RLE, sacral wound, elevate RLE in bed    Current Status:  Bed Mobility: Humaira   Transfer: Humaira slideboard bed<>w/c, Siddharth for commode.   Gait: unable, unsafe  Stairs: not tested  Balance: Stable dynamic sitting.     Assessment: Refused in AM d/t GI issues and in PM d/t high anxiety. Pt reported she is very fearful of discharging home as she does not think she has the support and is worried about risk of infection w/ incontinence and current wounds. Stating that even though her brother/son will be home at times and say they can help, she does not trust that they will. Validated pt's concerns. SW previously provided family with many resources (see SW note). Encouraged pt to speak with her sister and review resources.     Other Barriers to Discharge (DME, Family Training, etc):   DME: K3 w/c (delivery scheduled for 5/10), slideboard and ramp (emailed handouts to sister)    Family training: Friday 8:30-10:30 w/ brother and Sunday time TBD. Car tx, bumping up 3STE in w/c.

## 2024-05-08 NOTE — PROGRESS NOTES
"Covering again for primary SW.     This writer reached out to Lupe with [facility] admissions to check on the status of the OP HD schedule. Per Lupe, \"we have acceptance with the [facility] for a TTS 1st shift schedule. If you could please give them a call to update them on this discharge date as well, as admissions is no longer following the referral since patient has a chair time. You can let them know that she prefers a MWF and they can see if they are able to take her on that or they can put her on a waitlist to be moved to a MWF once available and start her on the TTS schedule\". Lupe provided the number for [facility] and denied any needs on her end.    SW called [facility] PH: [phone]. Spoke with Fallon. Fallon stated that they can offer pt a TTHS 5:30am or 11:15am start. No MWF at this time. Fallon stated that a lot changes and pt can be added to a wait list for a different date or time. Fallon requested that SW send updated clinicals. Updated clinicals faxed to Fax: [phone].     Met with pt. Pt on speaker phone with sister Yue. SW spoke with pt and sister for about 20 minutes until pt got a personal call and SW left. JUANITA confirmed discharge address for the rental home (below), discussed HD schedule and times that are available (pt prefers 11:15 time), HC set up and services, applying for MA and working with elder care , and services that the [facility] can provide if able to get on MA. Yue asked SW to call her when pt got personal call.     SW went to office and called sister. Again reiterated the same information above, provided more details information about HC, and discussed discharge planning and concerns. Pt sister stated, \"you are setting her up for failure\" and expressed her concerns about pt's incontinence, being home alone, risk of falling, anxiety, and housing situation. JUANITA validated Yue's concerns and also discussed TCU qualifications and discussed other " "options for discharge. Discussed private pay caregivers, another friend/family home, respite care, & life alert. Emailed pt sister resources on elder care attorneys, [employer] [place], [facility], a place for mom, senior linkage line, and private pay caregiver list. At one point, Yue shared that she has considered moving her home office to stay with pt initially. Yue also shared that she considered having pt come to her home, but her home has stairs. Asked about main level living. At first, Yue stated that wouldn't work but then started to consider turning her dining room into a room. Acknowledged that the therapists here can help navigate a safe environment with home barriers if wanting to discuss further. SW had to end the call and attend a meeting. Yue expressed appreciation and stated that they will \"have to just figure it out\".     JUANITA received a call/vm from Insurance [employer] JANAY Harrison PH: [phone]. JUANITA called Hunter back and left a vm.     JUANITA sent a HC RN, PT, OT, HA, and SW referral to [facility] and pending.     JUANITA will continue to follow. Still targeting Sun 05/12/24 as discharge date. Family training later this week. Pt will be given an IMM before discharge and always has the right to appeal her discharge if not feeling ready to discharge. Resources and options discussed with pt and family.     [facility] Care Coordinator:  Shavonne Bang [phone]     Patient's Sister Yue E: [email or web address]     Discharge Address:   [address] [place] [id number]    Outpatient Dialysis:   [facility]  [address] [place] [address]   PH: [phone]   Fax: [phone]  First OP HD Date: Tuesday May 14th    First Day Chair Time: 11:15am  Chair Schedule: T, Th, Sat  Chair Time: First shift, TBD    ALONA Sampson  [facility]    PH: [phone] & Fax: [phone]  Email: [email or web address]     "

## 2024-05-08 NOTE — PROGRESS NOTES
CLINICAL NUTRITION SERVICES - BRIEF NOTE     Nutrition Prescription    Recommendations already ordered by Registered Dietitian (RD):  -Ensure Enlive vanilla w/ breakfast    Future/Additional Recommendations:  Continue to monitor PO intake, supplement use, labs, and weight trends      REASON FOR ASSESSMENT  Shirley Hendricks is a/an 63 year old female assessed by the dietitian for Patient/Family Request - Would like to have ensure or boost to increase her protein to heal up her wounds.     Findings  RD met with Shirley at bedside who reports continued poor intake d/t dislike for hospital foods and gluten-free options. Pt is agreeable to Ensure with breakfast. Pt states she typically orders breakfast but does not eat it.     INTERVENTIONS  Implementation  Medical food supplement therapy      Follow up/Monitoring  RD will continue to follow per nutrition protocol.    Rosalba Tenorio, MPH, RDN, LD  Behavioral Health Adult & Pediatric Dietitian  Contact via Vixlo or Teams

## 2024-05-08 NOTE — PLAN OF CARE
Goal Outcome Evaluation:      Plan of Care Reviewed With: patient    Overall Patient Progress: improvingOverall Patient Progress: improving    Pt alert and oriented x4, able to make needs known and call light within range. On gluten free, regular texture/ thin/ whole with fair appetite for dinner during the shift. Assist x 1 for chair/ bed transfer with sliding board/ sera steady. PRN dilaudid was used for stump pain before dressing at bedtime. BP elevated post dialysis, scheduled metoprolol was given and was effective. BP at bedtime 130/72. COD to stump done. Pt incontinent to bowel x 2 during the shift. Reported stomach tenderness subsiding.

## 2024-05-09 ENCOUNTER — APPOINTMENT (OUTPATIENT)
Dept: GENERAL RADIOLOGY | Facility: CLINIC | Age: 64
End: 2024-05-09
Attending: PHYSICIAN ASSISTANT
Payer: COMMERCIAL

## 2024-05-09 ENCOUNTER — APPOINTMENT (OUTPATIENT)
Dept: OCCUPATIONAL THERAPY | Facility: CLINIC | Age: 64
DRG: 559 | End: 2024-05-09
Attending: PHYSICAL MEDICINE & REHABILITATION
Payer: COMMERCIAL

## 2024-05-09 ENCOUNTER — APPOINTMENT (OUTPATIENT)
Dept: PHYSICAL THERAPY | Facility: CLINIC | Age: 64
DRG: 559 | End: 2024-05-09
Attending: PHYSICAL MEDICINE & REHABILITATION
Payer: COMMERCIAL

## 2024-05-09 LAB
ALBUMIN SERPL BCG-MCNC: 1.9 G/DL (ref 3.5–5.2)
ANION GAP SERPL CALCULATED.3IONS-SCNC: 6 MMOL/L (ref 7–15)
BUN SERPL-MCNC: 17.4 MG/DL (ref 8–23)
CALCIUM SERPL-MCNC: 7.3 MG/DL (ref 8.8–10.2)
CHLORIDE SERPL-SCNC: 96 MMOL/L (ref 98–107)
CREAT SERPL-MCNC: 3.37 MG/DL (ref 0.51–0.95)
DEPRECATED HCO3 PLAS-SCNC: 24 MMOL/L (ref 22–29)
EGFRCR SERPLBLD CKD-EPI 2021: 14 ML/MIN/1.73M2
ERYTHROCYTE [DISTWIDTH] IN BLOOD BY AUTOMATED COUNT: 17.6 % (ref 10–15)
GLUCOSE SERPL-MCNC: 75 MG/DL (ref 70–99)
HCT VFR BLD AUTO: 30.8 % (ref 35–47)
HGB BLD-MCNC: 9.7 G/DL (ref 11.7–15.7)
MAGNESIUM SERPL-MCNC: 1.7 MG/DL (ref 1.7–2.3)
MCH RBC QN AUTO: 30.3 PG (ref 26.5–33)
MCHC RBC AUTO-ENTMCNC: 31.5 G/DL (ref 31.5–36.5)
MCV RBC AUTO: 96 FL (ref 78–100)
PHOSPHATE SERPL-MCNC: 2.7 MG/DL (ref 2.5–4.5)
PLATELET # BLD AUTO: 191 10E3/UL (ref 150–450)
POTASSIUM SERPL-SCNC: 4.7 MMOL/L (ref 3.4–5.3)
RBC # BLD AUTO: 3.2 10E6/UL (ref 3.8–5.2)
SODIUM SERPL-SCNC: 126 MMOL/L (ref 135–145)
WBC # BLD AUTO: 3.2 10E3/UL (ref 4–11)

## 2024-05-09 PROCEDURE — 97110 THERAPEUTIC EXERCISES: CPT | Mod: GO

## 2024-05-09 PROCEDURE — 97110 THERAPEUTIC EXERCISES: CPT | Mod: GP

## 2024-05-09 PROCEDURE — 36415 COLL VENOUS BLD VENIPUNCTURE: CPT | Performed by: PHYSICIAN ASSISTANT

## 2024-05-09 PROCEDURE — 99231 SBSQ HOSP IP/OBS SF/LOW 25: CPT | Mod: 24 | Performed by: INTERNAL MEDICINE

## 2024-05-09 PROCEDURE — 634N000001 HC RX 634: Mod: JZ | Performed by: INTERNAL MEDICINE

## 2024-05-09 PROCEDURE — 99223 1ST HOSP IP/OBS HIGH 75: CPT | Performed by: STUDENT IN AN ORGANIZED HEALTH CARE EDUCATION/TRAINING PROGRAM

## 2024-05-09 PROCEDURE — 128N000003 HC R&B REHAB

## 2024-05-09 PROCEDURE — 99232 SBSQ HOSP IP/OBS MODERATE 35: CPT | Performed by: PHYSICIAN ASSISTANT

## 2024-05-09 PROCEDURE — 82040 ASSAY OF SERUM ALBUMIN: CPT | Performed by: PHYSICIAN ASSISTANT

## 2024-05-09 PROCEDURE — 83735 ASSAY OF MAGNESIUM: CPT | Performed by: PHYSICIAN ASSISTANT

## 2024-05-09 PROCEDURE — 250N000011 HC RX IP 250 OP 636: Performed by: PHYSICIAN ASSISTANT

## 2024-05-09 PROCEDURE — 250N000013 HC RX MED GY IP 250 OP 250 PS 637: Performed by: PHYSICIAN ASSISTANT

## 2024-05-09 PROCEDURE — 90935 HEMODIALYSIS ONE EVALUATION: CPT

## 2024-05-09 PROCEDURE — 74018 RADEX ABDOMEN 1 VIEW: CPT

## 2024-05-09 PROCEDURE — 85027 COMPLETE CBC AUTOMATED: CPT | Performed by: PHYSICIAN ASSISTANT

## 2024-05-09 PROCEDURE — 74018 RADEX ABDOMEN 1 VIEW: CPT | Mod: 26 | Performed by: RADIOLOGY

## 2024-05-09 PROCEDURE — 97535 SELF CARE MNGMENT TRAINING: CPT | Mod: GO

## 2024-05-09 RX ORDER — ALBUMIN (HUMAN) 12.5 G/50ML
50 SOLUTION INTRAVENOUS
Status: DISCONTINUED | OUTPATIENT
Start: 2024-05-09 | End: 2024-05-09

## 2024-05-09 RX ADMIN — SILVER SULFADIAZINE: 10 CREAM TOPICAL at 09:29

## 2024-05-09 RX ADMIN — HEPARIN SODIUM 5000 UNITS: 5000 INJECTION, SOLUTION INTRAVENOUS; SUBCUTANEOUS at 20:10

## 2024-05-09 RX ADMIN — AMLODIPINE BESYLATE 10 MG: 10 TABLET ORAL at 11:18

## 2024-05-09 RX ADMIN — CARVEDILOL 6.25 MG: 6.25 TABLET, FILM COATED ORAL at 18:02

## 2024-05-09 RX ADMIN — NICOTINE 1 PATCH: 14 PATCH, EXTENDED RELEASE TRANSDERMAL at 09:27

## 2024-05-09 RX ADMIN — MICONAZOLE NITRATE: 20 POWDER TOPICAL at 20:11

## 2024-05-09 RX ADMIN — GABAPENTIN 200 MG: 100 CAPSULE ORAL at 20:14

## 2024-05-09 RX ADMIN — ROSUVASTATIN CALCIUM 10 MG: 5 TABLET, COATED ORAL at 20:10

## 2024-05-09 RX ADMIN — MICONAZOLE NITRATE: 20 POWDER TOPICAL at 09:29

## 2024-05-09 RX ADMIN — Medication 1 CAPSULE: at 20:10

## 2024-05-09 RX ADMIN — EPOETIN ALFA-EPBX 4000 UNITS: 10000 INJECTION, SOLUTION INTRAVENOUS; SUBCUTANEOUS at 15:20

## 2024-05-09 RX ADMIN — ACETAMINOPHEN 1000 MG: 500 TABLET ORAL at 20:11

## 2024-05-09 RX ADMIN — HEPARIN SODIUM 5000 UNITS: 5000 INJECTION, SOLUTION INTRAVENOUS; SUBCUTANEOUS at 09:31

## 2024-05-09 RX ADMIN — CLOPIDOGREL BISULFATE 75 MG: 75 TABLET ORAL at 11:19

## 2024-05-09 RX ADMIN — CARVEDILOL 6.25 MG: 6.25 TABLET, FILM COATED ORAL at 11:19

## 2024-05-09 RX ADMIN — ONDANSETRON 4 MG: 4 TABLET, ORALLY DISINTEGRATING ORAL at 09:36

## 2024-05-09 ASSESSMENT — ACTIVITIES OF DAILY LIVING (ADL)
ADLS_ACUITY_SCORE: 39
ADLS_ACUITY_SCORE: 40
ADLS_ACUITY_SCORE: 39
ADLS_ACUITY_SCORE: 40
ADLS_ACUITY_SCORE: 39

## 2024-05-09 NOTE — PLAN OF CARE
Discharge Plan: home with HH PT      Precautions: fall, NWB of RLE, sacral wound, elevate RLE in bed    Current Status:  Bed Mobility: Humaira   Transfer: Humaira slideboard bed<>w/c, Siddharth for commode.   Gait: unable, unsafe  Stairs: not tested  Balance: Stable dynamic sitting.     Assessment: Focus on residual limb and LLE strengthening today. Session overall limited due to nausea/vomiting, but patient still willing to participate in therapy.     Other Barriers to Discharge (DME, Family Training, etc):   DME: K3 w/c (delivery scheduled for 5/10), slideboard and ramp (emailed handouts to sister)    Family training: Friday 8:30-10:30 w/ brother and Sunday time TBD. Car tx, bumping up 3STE in w/c.
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
## 2024-05-09 NOTE — PLAN OF CARE
Goal Outcome Evaluation:      Plan of Care Reviewed With: patient    Overall Patient Progress: improvingOverall Patient Progress: improving    Outcome Evaluation: Pt feeling a little better today, emotionally. Pt spoke with psychiatry. Pt had emesis x1 this AM, admnistered zofran. Some oral meds held and BP meds given late due to N/V. Wound cares completed to R AKA and new dressing applied. Pt has R CVC for HD and L PIV. Pt left for dialysis around 1140. Handoff provided to dialysis RN.  Loose BM x2 during shift.    FOCUS/GOAL  Bowel management, Bladder management, Mobility, Skin integrity, Caregiver training, Carryover of rehab techniques, and Psychosocial needs    ASSESSMENT, INTERVENTIONS AND CONTINUING PLAN FOR GOAL:

## 2024-05-09 NOTE — PLAN OF CARE
Discharge Planner Post-Acute Rehab OT:      Discharge Plan: home with assist and HH OT      Precautions: fall, NWB of RLE, dialysis, sacral wound, elevate residual limb when in bed     Current Status:  ADLs:  Mobility: mod I for slide board to WC from bed  Grooming: IND seated at sink in w/c  Dressing: UB: IND, LB: IND in supine, IND for brace and shoe  Bathing: TBD  Toileting: SB to commode Ax1, assit for lydia cares and clothing depending on continence  IADLs: Pt does most IADL including caregiving for .   Vision/Cognition: intact     Assessment: Pt declining all OOB activity despite encouragement for OT session d/t continued nausea. Session focused on BUE strengthening for ADL routines and functional transfers. Pt demonstrating increased strength utilizing 2-3lb dumbbells for exercises.     Other Barriers to Discharge (DME, Family Training, etc): DME, family training, pt has support from multiple family members and will need to schedule family training since she will most likely still require assist with toileting.     5/4 Pt states her H is staying with his sister and she hopes this will con't.  She reports her brother will help her but she would like to be IND with toileting as he is not likely to assist her with toileting.

## 2024-05-09 NOTE — PLAN OF CARE
Goal Outcome Evaluation:      Plan of Care Reviewed With: patient    Overall Patient Progress: no changeOverall Patient Progress: no change     Patient alert and oriented x 4, able to make needs known and use call light. Bed alarm on for safety. Denies SOB, chest pain and lightheadedness. Dressing changed Right AKA, tolerated well. Had incontinent loose BM this shift. CVC right chest CDI. With PIV left-saline lock.

## 2024-05-09 NOTE — PROGRESS NOTES
Nephrology Progress Note  05/08/2024         Assessment & Recommendations:   Shirley Hendricks is a 63 year old year old with peripheral artery disease, coronary artery disease and history of MI, hypertension, diabetes mellitus type 2 who is admitted to ARU following hospitalization for occlusion of right LE bypass graft managed with right above-the-knee amputation and complication of acute kidney injury requiring dialysis.     #Acute kidney injury: multifactorial (use of IV contrast 3 days in a row for evaluation and management of her occluded bypass graft, ischemia and possibly rhabdomyolysis) Creatinine level was 0.7 mg/dL prior to this episode. No urine studies available prior to LIMA to assess baseline proteinuria.  -First dialysis run 4/3/2024.  -Access tunneled CVC initially placed 4/3/2024 and revised on 4/15/2024.  - Pursue dialysis Tuesday, Thursday, Saturday for now but continue to watch closely for recovery  - Please obtain daily renal panel to assess signs of recovery of kidney function.     - Plan dialysis 5/9/24 per usual schedule     - had been receiving hemodialysis at [facility] and consents to ongoing hemodialysis while at [facility].     #Hyponatremia:   - Patient reports adhering to fluid restriction  - please continue to minimize shifts in serum Na with each dialysis     # Systolic Blood pressure remains suboptimal. Relative bradycardia on carvedilol 6.25 mg twice daily.   On amlodipine 5 mg daily and bumetanide 4 mg on non-dialysis days  #PAD/PVD  #Right common femoral artery to the mid anterior tibial artery bypass graft acute occlusion  #Right above-the-knee amputation 4/9/2024  - Plavix 75 mg daily (previously on Xarelto but no ongoing indication)     #Anemia due to acute blood loss with retroperitoneal hematoma and history of GI bleeding in December 2023.  - retacrit 10K units three times weekly with HD  -Received iron sucrose on 4/30     #Coronary artery disease with history of  "myocardial infarction x 3  #History of Takotsubo cardiomyopathy  Recommend increasing amlodipine to 10 mg daily (patient with relative bradycardia).       #Diabetes mellitus type 2     #Stage Mark marginal zone B-cell lymphoma  - Undergoing surveillance with [place] [facility]     Recommendations were communicated to primary team via note    I spent a total of 30 minutes in reviewing the medical records, face-to-face evaluation and physical exam, review of labs, counseling, orders, care coordination and documentation      Case Cohen MD   Division of Renal Disease and Hypertension  [employer]  Vocera Web Console    Interval History : May 8, 2024    Nursing and provider notes from last 24 hours reviewed.  Patient seen and examined this afternoon  Pt complained of constipation and abd dicomfort today.  Denied CP, SOB,.  No N/V  Appeared and acted quite despondent today.    Physical Exam:   I/O last 3 completed shifts:  In: 350 [P.O.:350]  Out: 2.5 [Other:2.5]   BP (!) 159/65   Pulse 75   Temp 98.6  F (37  C) (Oral)   Resp 18   Ht 1.549 m (5' 0.98\")   Wt 56.7 kg (125 lb)   LMP  (LMP Unknown)   SpO2 94%   BMI 23.63 kg/m       GENERAL APPEARANCE: no distress  EYES:  no scleral icterus, pupils equal  Mouth: dry mucosa  PULM: normal work of breathing.  Lungs clear to lat auscultation  CV: RRR. No rub, gallop  1+ left foot edema  R AKA  INTEGUMENT: no cyanosis, no rash  NEURO:  speech fluent, face symmetric    Labs:   All labs reviewed by me  Electrolytes/Renal -   Recent Labs   Lab Test 05/08/24  0546 05/07/24  0553 05/06/24  0614 05/05/24  0642 05/04/24  0249   * 123*  --   --  126*   POTASSIUM 4.2 4.7  --   --  4.0   CHLORIDE 96* 90*  --   --  92*   CO2 24 22  --   --  24   BUN 11.1 21.1  --   --  17.8   CR 2.66* 3.62*  --   --  2.98*   GLC 79 71  --   --  80   SONIA 7.4* 7.3*  --   --  7.4*   MAG 1.9 1.9 2.1   < > 1.8   PHOS 2.5 2.6  --   --  2.9    < > = values in this interval not displayed.       CBC - "   Recent Labs   Lab Test 05/07/24  0553 05/02/24  0551 05/01/24  0925   WBC 3.3* 3.6* 3.9*   HGB 9.4* 8.7* 9.3*    223 222       LFTs -   Recent Labs   Lab Test 05/08/24  0546 05/07/24  0553 04/30/24  0653 04/23/24  0602   ALKPHOS  --  90 100 96   BILITOTAL  --  0.5 0.4 0.5   ALT  --  12 24 20   AST  --  14 21 16   PROTTOTAL  --  4.1* 4.3* 4.1*   ALBUMIN 1.9* 1.9* 2.0*  2.0* 1.9*       Iron Panel -   Recent Labs   Lab Test 04/10/24  0559 04/09/24  1817 01/08/24  1542 12/11/23  0550   IRON 22*  --  71 122   IRONSAT 18  --  17 32   BRYN  --  609* 25 23       Current Medications:  Current Facility-Administered Medications   Medication Dose Route Frequency Provider Last Rate Last Admin    amLODIPine (NORVASC) tablet 10 mg  10 mg Oral Daily Corrina Hurt PA-C        bumetanide (BUMEX) tablet 4 mg  4 mg Oral Once per day on Sunday Monday Wednesday Friday Corrina Hurt PA-C   4 mg at 05/08/24 0920    carvedilol (COREG) tablet 6.25 mg  6.25 mg Oral BID w/meals Corrina Hurt PA-C   6.25 mg at 05/08/24 1736    cetirizine (zyrTEC) tablet 5 mg  5 mg Oral Daily Corrina Hurt PA-C   5 mg at 05/08/24 0918    clopidogrel (PLAVIX) tablet 75 mg  75 mg Oral Daily Corrina Hurt PA-C   75 mg at 05/08/24 0918    famotidine (PEPCID) tablet 20 mg  20 mg Oral Q48H Corrina Hurt PA-C   20 mg at 05/08/24 1545    fluticasone-vilanterol (BREO ELLIPTA) 200-25 MCG/ACT inhaler 1 puff  1 puff Inhalation Daily Corrina Hurt PA-C   1 puff at 05/08/24 0919    gabapentin (NEURONTIN) capsule 200 mg  200 mg Oral Once per day on Tuesday Thursday Saturday Corrina Hurt PA-C   200 mg at 05/07/24 2133    heparin ANTICOAGULANT injection 5,000 Units  5,000 Units Subcutaneous Q12H Corrina Hurt PA-C   5,000 Units at 05/08/24 2058    lactobacillus rhamnosus (GG) (CULTURELL) capsule 1 capsule  1 capsule Oral BID Corrina Hurt PA-C   1 capsule at 05/08/24 2059    levofloxacin (LEVAQUIN)  tablet 500 mg  500 mg Oral Every Other Day Maya Watkins MD   500 mg at 05/08/24 0919    miconazole (MICATIN) 2 % powder   Topical BID Corrina Hurt PA-C   Given at 05/08/24 2101    multivitamin RENAL (TRIPHROCAPS) capsule 1 capsule  1 capsule Oral Daily Corrina Hurt PA-C   1 capsule at 05/08/24 1200    nicotine (NICODERM CQ) 14 MG/24HR 24 hr patch 1 patch  1 patch Transdermal Daily Corrina Hurt PA-C   1 patch at 05/08/24 0919    polyethylene glycol (MIRALAX) Packet 17 g  17 g Oral Daily Corrina Hurt PA-C   17 g at 05/08/24 0921    rosuvastatin (CRESTOR) tablet 10 mg  10 mg Oral At Bedtime Corrina Hurt PA-C   10 mg at 05/08/24 2059    senna-docusate (SENOKOT-S/PERICOLACE) 8.6-50 MG per tablet 1-2 tablet  1-2 tablet Oral BID Corrina Hurt PA-C   1 tablet at 05/08/24 2058    silver sulfADIAZINE (SILVADENE) 1 % cream   Topical Daily Gala oL MD   Given at 05/08/24 1934     Current Facility-Administered Medications   Medication Dose Route Frequency Provider Last Rate Last Admin     Case Cohen MD

## 2024-05-09 NOTE — PROGRESS NOTES
THIS MORNING: [facility] accepted for RN, PT, OT, SW, & HHA at discharge. Will update intake ([email or web address]: [email or web address]) to confirm discharge. [facility] has access to EPIC and can pull orders from pt chart.     JUANITA called [facility] PH: [phone] this morning and spoke with Fallon again. JUANITA confirmed that Fallon received the fax from JUANITA. Per Fallon, RN will review the clinicals and follow-up later today if anything else is needed. Nephrology to fax orders at time of discharge (fax number below). All HD details in pt AVS. Will update pt before discharge home.     JUANITA called and left another vm for MARIAELENA Harrison CM (below).   ---------------------------------------------------------------------------------------------------------------  THIS AFTERNOON: Post huddle today with IDT. Discussed discharge. Will support TCU for a short term rehab and discuss alternative if unable to secure. JUANITA called pt sister and discussed. Sent referrals to [facility], [place], [facility], and [facility]. Pt sister stated that she would update the pt and that they have discussed this all extensively. Pt sister stated that she has secured all rides for pt to get to OP HD wherever pt is at discharge. Pt sister denied additional needs at this time. Family will still come for training tomorrow as planned. JUANITA updated [facility]. Will follow-up on referrals and update [facility] once more information is determined.     Follow up: TCU referrals, update [facility] again and [facility] (below), provide pt with amputation resources, and coordinate further. Recommend OP psychology follow-up and sticky note left MD. Will remain available and continue to follow.   ---------------------------------------------------------------------------------------------------------------  Patient's Sister:   Yue SALAZAR: [email or web address]      Discharge Address:   [address]  [place] [address]    [facility] UR CM:   Hunter PH: [phone]     [facility] Care Coordinator:  Shavonne Bang [phone]     Outpatient Dialysis:   [facility] [place]  [address]   [place] [address]   PH: [phone]   Fax: [phone]  First OP HD Date: Tuesday May 14th    First Day Chair Time: 11:15am  Chair Schedule: T, Th, Sat  Chair Time: First shift, TBD    ALONA Sampson  [facility]    PH: [phone] & Fax: [phone]  Email: [email or web address]

## 2024-05-09 NOTE — PROGRESS NOTES
Date: 5/9/2024  Time: 1415     Data:  Pre Wt:   46.9 kg bed scale  Desired Wt:   To be established  Post Wt:  44.8 kg bed sscale  Weight change: - 2.1 kg  Ultrafiltration - Post Run Net Total Removed (mL):  2000 ml  Vascular Access Status: CVC patent  Dialyzer Rinse:  Light  Total Blood Volume Processed: 67.2 L   Total Dialysis (Treatment) Time:   3 Hrs  Dialysate Bath: K 2, Ca 2.5  Heparin: Heparin: None     Lab:   No  HbsAg - Nonreactive (5/7/24)  HbsAb - <3.50 Susceptible (5/7/24)     Interventions:  Dialysis done through [facility] CVC  UF set to 2.3 Liters of fluid removal, accommodating priming and rinse back volumes  ,   See MAR for medications  administered  CVC dressing CDI  CritLine showed a stable Profile B/C throughout the run, tolerating UF pull  Treatment has ended safely and  blood is rinsed back completely  Catheter lumens flushed with saline and locked with NS, catheter caps (ClearGuard ) changed post HD  Report given to PCN, sent back to [facility] room in stable condition     Assessment:  A/O x 4, calm & cooperative, denies pain  Lung sounds clear  CVC intact, previous dressing clean and dry  3 hour tx initiated and terminated in the usual fashion.  2.0L UF was tolerated well. 1 small, slightly formed BM. Asymptomatic HTN throughout run. Overall, run was uneventful.             Plan:    Per Renal team        Ascencion Barrios RN  Acute Dialysis RN  [facility]

## 2024-05-09 NOTE — PLAN OF CARE
Goal Outcome Evaluation:  Overall Patient Progress: no changeOverall Patient Progress: no change    Patient is alert and oriented x4. Make needs known. Has been sleeping well tonight. No complaints of pain. On hemo dialysis TThS, via right chest CVC. Known incontinent of bowel. No issues overnight. Continue poc.

## 2024-05-09 NOTE — PROGRESS NOTES
"  [facility]   Acute Rehabilitation Unit  Daily progress note    INTERVAL HISTORY  Shirley Hendricks was seen at bedside this morning.  No acute events reported overnight.  She reports recurrent nausea and emesis this morning.  Notes she was feeling fine when she first woke, had an Ensure and then abrupt onset of nausea and vomiting.  She had not yet taken any morning medications, but was given Zofran.  She denies any abdominal pain.  She reports ongoing loose/soft bowel movements, which occur urgently.  She is worried about her ability to remain continent at home and not having assistance available for those cares (and unable to do herself).  She notes intermittent mild dizziness/lightheadedness.  She denies chest pain/tightness, shortness of breath, dysuria.  Notes a lot more urine output yesterday than on prior days.  She reports pain overall is stable.  She is trying to avoid using Dilaudid recognizing that could have contributed to her bowel issues.  She is having ongoing phantom sensations, though not necessarily painful.  She feels her left leg is still weaker than she would like.  She reports feeling \"scared\" to go home as she does not feel ready, does not feel she has adequate support, and has concerns about both physical and emotional environment at home.  She is concerned she will not be able to transfer in and out of her bed and would need to sleep in wheelchair.  She also is not modified independent with her bowel management in its current state.  She requires Ax2 to navigate stairs in/out of her home to get to dialysis and does not have that consistently available.      With therapies, she is mod I with bed mobility, mod I with slideboard transfers bed<>wheelchair and min A for commode transfers.  Plan for family training tomorrow with brother and some additional on Sunday.  However, given she has not been able to progress to mod I with toileting and not having " assistance available consistently for toileting needs or navigating stairs in/out home, discussed with team to consider TCU referrals.    MEDICATIONS  Current Facility-Administered Medications   Medication Dose Route Frequency Provider Last Rate Last Admin    amLODIPine (NORVASC) tablet 10 mg  10 mg Oral Daily Corrina Hurt PA-C        bumetanide (BUMEX) tablet 4 mg  4 mg Oral Once per day on Sunday Monday Wednesday Friday Corrina Hurt PA-C   4 mg at 05/08/24 0920    carvedilol (COREG) tablet 6.25 mg  6.25 mg Oral BID w/meals Corrina Hurt PA-C   6.25 mg at 05/08/24 1736    cetirizine (zyrTEC) tablet 5 mg  5 mg Oral Daily Corrina Hurt PA-C   5 mg at 05/08/24 0918    clopidogrel (PLAVIX) tablet 75 mg  75 mg Oral Daily Corrina Hurt PA-C   75 mg at 05/08/24 0918    famotidine (PEPCID) tablet 20 mg  20 mg Oral Q48H Corrina Hurt PA-C   20 mg at 05/08/24 1545    fluticasone-vilanterol (BREO ELLIPTA) 200-25 MCG/ACT inhaler 1 puff  1 puff Inhalation Daily Corrina Hurt PA-C   1 puff at 05/08/24 0919    gabapentin (NEURONTIN) capsule 200 mg  200 mg Oral Once per day on Tuesday Thursday Saturday Corrina Hurt PA-C   200 mg at 05/07/24 2133    heparin ANTICOAGULANT injection 5,000 Units  5,000 Units Subcutaneous Q12H Corrina Hurt PA-C   5,000 Units at 05/08/24 2058    lactobacillus rhamnosus (GG) (CULTURELL) capsule 1 capsule  1 capsule Oral BID Corrina Hurt PA-C   1 capsule at 05/08/24 2059    levofloxacin (LEVAQUIN) tablet 500 mg  500 mg Oral Every Other Day Maya Watkins MD   500 mg at 05/08/24 0919    miconazole (MICATIN) 2 % powder   Topical BID Corrina Hurt PA-C   Given at 05/08/24 2101    multivitamin RENAL (TRIPHROCAPS) capsule 1 capsule  1 capsule Oral Daily Corrina Hutr PA-C   1 capsule at 05/08/24 1200    nicotine (NICODERM CQ) 14 MG/24HR 24 hr patch 1 patch  1 patch Transdermal Daily Corrina Hurt PA-C   1 patch  "at 05/08/24 0919    polyethylene glycol (MIRALAX) Packet 17 g  17 g Oral Daily Corrina Hurt PA-C   17 g at 05/08/24 0921    rosuvastatin (CRESTOR) tablet 10 mg  10 mg Oral At Bedtime Corrina Hurt PA-C   10 mg at 05/08/24 2059    senna-docusate (SENOKOT-S/PERICOLACE) 8.6-50 MG per tablet 1-2 tablet  1-2 tablet Oral BID Corrina Hurt PA-C   1 tablet at 05/08/24 2058    silver sulfADIAZINE (SILVADENE) 1 % cream   Topical Daily Gala Lo MD   Given at 05/08/24 1934          Current Facility-Administered Medications   Medication Dose Route Frequency Provider Last Rate Last Admin    acetaminophen (TYLENOL) tablet 500-1,000 mg  500-1,000 mg Oral Q6H PRN Corrina Hurt PA-C   1,000 mg at 05/01/24 1441    [Held by provider] diphenoxylate-atropine (LOMOTIL) 2.5-0.025 MG per tablet 1 tablet  1 tablet Oral 4x Daily PRN Corrina Hurt PA-C   1 tablet at 04/30/24 1538    HYDROmorphone (DILAUDID) tablet 2 mg  2 mg Oral Q6H PRN Corrina Hurt PA-C   2 mg at 05/07/24 2134    naloxone (NARCAN) injection 0.2 mg  0.2 mg Intravenous Q2 Min PRN Arun Pimentel MD        Or    naloxone (NARCAN) injection 0.4 mg  0.4 mg Intravenous Q2 Min PRN Arun Pimentel MD        Or    naloxone (NARCAN) injection 0.2 mg  0.2 mg Intramuscular Q2 Min PRN Arun Pimentel MD        Or    naloxone (NARCAN) injection 0.4 mg  0.4 mg Intramuscular Q2 Min PRN Arun Pimentel MD        nitroGLYcerin (NITROSTAT) sublingual tablet 0.4 mg  0.4 mg Sublingual Q5 Min PRN Corrina Hurt PA-C        ondansetron (ZOFRAN ODT) ODT tab 4 mg  4 mg Oral Q6H PRN Corrina Hurt PA-C   4 mg at 05/05/24 0805    polyethylene glycol (MIRALAX) powder 17 g  17 g Oral Daily PRN Corrina Hurt PA-C            PHYSICAL EXAM  BP (!) 159/65   Pulse 75   Temp 98.6  F (37  C) (Oral)   Resp 18   Ht 1.549 m (5' 0.98\")   Wt 56.7 kg (125 lb)   LMP  (LMP Unknown)   SpO2 94%   BMI 23.63 kg/m    Gen: NAD, lying in bed  HEENT: NC/AT, " MMM  Cardio: appears well-perfused, +dialysis catheter R chest  Pulm: non-labored on room air  Abd: soft, non-tender, non-distended  Ext: some edema in RLE; charcot foot deformity on left; R AKA site as pictured below, appears similar to prior days  Neuro/MSK: awake, alert, moving all extremities actively in bed        LABS  CBC RESULTS:   Recent Labs   Lab Test 05/07/24  0553 05/02/24  0551 05/01/24  0925   WBC 3.3* 3.6* 3.9*   RBC 3.13* 2.80* 3.05*   HGB 9.4* 8.7* 9.3*   HCT 29.4* 26.9* 29.5*   MCV 94 96 97   MCH 30.0 31.1 30.5   MCHC 32.0 32.3 31.5   RDW 17.5* 18.4* 19.0*    223 222       Last Basic Metabolic Panel:  Recent Labs   Lab Test 05/08/24  0546 05/07/24  0553 05/04/24  0249   * 123* 126*   POTASSIUM 4.2 4.7 4.0   CHLORIDE 96* 90* 92*   CO2 24 22 24   ANIONGAP 9 11 10   GLC 79 71 80   BUN 11.1 21.1 17.8   CR 2.66* 3.62* 2.98*   GFRESTIMATED 19* 13* 17*   SONIA 7.4* 7.3* 7.4*         Rehabilitation - continue comprehensive acute inpatient rehabilitation program with multidisciplinary approach including therapies, rehab nursing, and physiatry following. See interval history for updates.      ASSESSMENT AND PLAN  Shirley Hendricks is a 63 year old right hand dominant female with complicated past medical history including but not limited to PAD with multiple prior bilateral lower extremity vascular bypass grafts and thrombectomies (most recently 10/2023 right common femoral to proximal anterior tibial PTFE bypass graft), bilateral Charcot-Breonna-Tooth foot deformity, prior bilateral carotid endarterectomies, B-cell lymphoma, CAD (hx Mix3), hypertension, hyperlipidemia, GI bleed (12/2023), type 2 diabetes mellitus, COPD, tobacco use disorder, iron deficiency anemia, GERD, Celiac disease, Takotsubo cardiomyopathy, SVT, depression/anxiety and spongiotic dermatitis who was admitted on 3/29/24 with acute occlusion of right lower extremity arterial bypass graft now s/p right above-knee amputation  4/1/24, completed 4/9/24 with hospital course complicated by acute renal failure now on dialysis, sepsis, acute blood loss anemia, acute post-operative pain, nausea, loose stools, delirium, malnutrition, and multiple electrolyte derangements.  She is now admitted to ARU on 4/22/24 for multidisciplinary rehabilitation and ongoing medical management.        Admission to acute inpatient rehab 04/22/24.    Impairment group code: Amputation 05.3 Unilateral LE AKA; emergent R AKA due to acute occlusion of RLE bypass graft         PT and OT 90 minutes of each daily for 6 days per week in addition to rehab nursing and close management of physiatrist.       Impairment of ADL's: Noted to have impaired activity tolerance, impaired balance, impaired strength, impaired weight shifting, and pain, all affecting her ability to safely and independently perform basic ADLs.  Goal for mod I with basic wheelchair-based ADLs with assist for bathing, as well as assist IADLs/heavier activities.     Impairment of mobility:  Noted to have impaired activity tolerance, impaired balance, impaired strength, impaired weight shifting, and pain, all affecting her ability to safely and independently perform basic mobility.  Goal for mod I with basic wheelchair-based mobility.     Impairment of cognition/language/swallow:  Noted to have dysphagia with goals for safe tolerance of least restrictive diet.  However, IP SLP noting did not anticipate further SLP services at ARU, no issues with tolerating regular diet.  Will not consult initially.  If any concern for difficulty tolerating current diet, can consult.     Medical Conditions  New actions/orders/updates for today are in blue.     S/p right AKA 4/1/24, completed 4/9/24 due to critical limb ischemia, acute occlusion of right common femoral artery to mid anterior tibial artery bypass graft   PAD/PVD with hx multiple prior vascular interventions to BLE  Bilateral Charcot-Breonna-Tooth foot  deformities  Acute post-op pain  - Wound care: daily dressing changes to right AKA with xeroform and gauze with kerlix to keep wound protected  - Dehiscence of lateral surgical incision with some increased serosanguinous drainage.  Also with multiple areas with necrotic appearance.  Overall similar to day of ARU admission although with more drainage.  Vascular surgery consulted/assessed on 4/28.  Appreciate assist.  Recommended ongoing daily dressing changes with Xeroform, gauze fluffs, loosely wrapped Kerlix.  DO NOT use ace wrap.  Stockinette can be placed into over-the-shoulder sling to prevent right AKA dressing from falling off during therapies.  Silvadene topical also added per vascular surgery on 4/29, to be applied daily to incision.  Some increased erythema noted at surgical site on 5/2.  Vascular surgery reassessed 5/3 and felt overall appearance not concerning.  Will likely eventually need debridement of anterior thigh but that will be deferred/evaluated at OP follow-up with Dr. Hernandez.  No indication for antibiotics at this time.  Continue current plan of care.  - Also with significant itching at surgical site.  Recently seen by derm for dermatitis as below and recommended to trial zyrtec or claritin for itching.  Started zyrtec 5 mg daily on 4/25.  - NWB RLE  - Continue PTA L foot custom orthotic   - Continue Plavix 75 mg daily (given hx left bypass).  No ongoing need for Xarelto per vascular (no recent DVT or PE)  - Pain management: APAP 500-1000 mg q6h PRN, dilaudid 2 mg q6h PRN, gabapentin 200 mg 3x/week after HD (renally dosed).  Wean opioids as able.   - Continue PT/OT  - Follow up with vascular surgery in 2-4 weeks (~5/28)     Acute blood loss anemia on anemia of chronic disease, hx iron deficiency  Retroperitoneal hematoma  Recent GI bleed (hospitalized 12/2023)  Required intermittent transfusion during this admission (3/30, 3/31, 4/2, 4/6, 4/9, 4/13).  Repeat CT abdomen on 4/20 with stable right  retroperitoneal hematoma; no s/o active bleed.  5/9: Hgb stable at 9.7  - Repeat CT abd/pelvis on 5/2 demonstrated decreased small right retroperitoneal hematoma (iatrogenic)  - Continue epo/venofer with HD per nephrology  - Trend CBC every T/Th/Sat  - Transfuse for Hgb <7     Acute renal failure on HD  Hyponatremia  Normal baseline Cr, though per nephrology, suspect some modest CKD.  LIMA this admission, up to peak Cr 4/22 on 4/3.  Likely BAILEY +/- rhabdo +/- ischemic injury with no signs of recovery and now dialysis dependent (started 4/3/24).  S/p tunneled dialysis catheter placement 4/3/24.  5/8: Cr 3.37; Na 126; plan for HD today  - Nephrology consulted, appreciate ongoing assistance  - HD T/Th/Sat at ARU or per nephrology recs.  Plan for OP HD at [facility] T/Th/Sat  - S/p diuretic challenge (Lasix 100 mg PO) on 4/29 per nephrology without improved output.  Evidence of fluid overload on CT abd/pelv 5/2 with mod pleural effusions, small volume ascites, diffuse soft tissue anasarca.  Per nephrology, recommended Bumex 4 mg daily on non-dialysis days, started on 5/6.  - Daily renal panel for now per nephrology  - Continue 1L fluid restriction  - Avoid NSAIDs/nephrotoxins, renally dose medications     Bilateral pleural effusion  Noted on CT abdomen 4/20 with moderate b/l pleural effusion (left>right).  Has been intermittently requiring 1-2L supplemental oxygen.  Unclear if related to volume given new renal failure on HD, also baseline COPD.  Repeat CT abd/pelv on 5/2 with moderate pleural effusions  - Monitor respiratory status, supplemental O2 by NC PRN to maintain sats >88%  - Consider thoracentesis if symptomatic or worsening hypoxia      Celiac disease  Colon distension   Loose stools, improving  Nausea/vomiting, improving  Severe malnutrition in context of acute on chronic illness  Pill cam study with [facility] in 3/2024 (due to recent GI bleed), which revealed mild non-erosive red tissue in the duodenum, some  "atrophic type appearance that could be celiac disease.  This was followed by celiac labs on 3/20/24 (Gliadin ab and tTG IgA antibody) which were positive and thus Celiac diagnosis was established and she was recommended for gluten-free diet.  This admission, noted to have colonic distension and circumferential wall thickening of the distal colon and rectum concerning for proctocolitis on CT abdomen.  Also with loose stools, nausea.  Despite this, patient declined gluten-free diet (switched on 4/18).  Noted to have improvement in loose stools and nausea at discharge to ARU per sending team.  However, has persisted with loose stools, intermittent nausea and vomiting.  Repeat cdiff on 4/28 negative.  Suspect symptoms are due to Celiac disease and non-compliance with gluten-free diet.  Patient agreeable to trial of gluten-free diet started on 4/28.  Due to recurrent nausea/emesis, ongoing loose stools, + new rebound/guarding in right abdomen, CT abdomen/pelvis obtained on 5/2 which showed \"persistent wall thickening of the descending and sigmoid colon in addition to the rectum, similar to prior study. This may also be due  to overall third spacing of fluid versus colitis given diarrhea history. Large volume of stool.\"  Loose stools could represent obstructive diarrhea.  Lomotil held, scopolamine stopped.  Started on bowel protocol.  Improvement in abdominal pain, nausea, vomiting.  Ongoing loose stools but improving in consistency per patient, frequency varies and continues to cause incontinence.  AXR with nonobstructive bowel gas pattern, moderate colonic stool burden, overall not significantly changed.  S/p enema on 5/6 with several subsequent loose Bms.  Daily Miralax added 5/7.  - Continue gluten-free diet  - RD consulted, appreciate assistance  - Repeat AXR to reassess.  If persistent stool burden, would plan for repeat enema after evening meal.  - Continue senokot-S 1-2 tabs BID scheduled  - Continue Miralax " daily  - PRN Zofran  - Continue probiotics BID  - Monitor symptoms  - Follow up with [facility] as outpatient    Abnormal UA  Given ongoing nausea/vomiting and abdominal/pelvic pain, as well as bladder wall thickening on CT, UA obtained to rule out UTI.   Results with large proteinuria, negative nitrites, small hematuria, large leuk esterase, many bacteria.  UC growing >100k colonies candida and 10-50k colonies of E. Coli.  Started on levofloxacin.  - Continue levofloxacin 500 mg every other day (thru 5/12)  - No clear urinary symptoms.    Hypomagnesemia  Replaced mag IV intermittently.  5/9: mag WNL at 1.7  - Continue to trend     Hypocalcemia  5/7: Ca corrects to 8.6 (mildly low) for hypoalbuminemia.  Have reached out to nephrology to ask about resuming supplement but without response.  - Per pharmacy, was on PTA calcium carbonate/vit D PTA (had run out 1 week before admission), not ordered while at hospital.   - Continue to trend    CAD (hx MI x3)  HTN  Hyperlipidemia  Hx Takotsubo CM  Hx SVT  Last coronary angiogram completed 10/2023.  Recovered EF (55-60%) from ECHO 11/2023.  - Continue PTA Coreg 6.25 mg BID  - Continue amlodipine 10 mg daily (increased 5/8 per nephrology)  - Continue Bumex 4 mg on non-dialysis days (added 5/6 per nephrology)  - Hold PTA lisinopril 10 mg daily due to renal failure  - PTA Jardiance 10 mg daily discontinued due to renal failure  - Rosuvastatin 10 mg daily resumed on ARU admission (previously held due to concern for rhabdo).  5/7: repeat hepatic panel WNL (with exception of low total protein)    - Monitor BP  - Was to follow up with cardiology in Feb 2024, should be scheduled after discharge    Murmur  Noted on exam this admission by attending physiatrist.  Per chart review, intermittently documented in the past with questionable/subtle murmur (though not by cardiology).  Most recent echo 11/7/23 with trace mitral regurg, trace tricuspid regurg, and moderate trileaflet aortic  sclerosis.  - Folllow up with cardiology as above     Hx bilateral carotid artery stenosis s/p bilateral carotid endarterectomies  - Follow up with vascular surgery for annual carotid duplex (last done on 1/4/24 with stable stenosis of right carotid bulb)     Unclear hx Diabetes mellitus, type II  Listed as diagnosis per chart review.  However, family reports that patient has never been diagnosed with diabetes and no A1c that is available in her chart reflects diabetic range.  A1c this admission 5.2%.  PTA Jardiance and gabapentin discontinued due to acute renal failure.  BG well-controlled during this admission without need for insulin.  - Monitor BG periodically with labs     B-cell lymphoma, stage VALENTIN  Followed by [facility] (Dr. Barrow).  Mild radiographic progression on CT 1/26/24.  Given asymptomatic, plans at that time for ongoing CT monitoring.  - Monitor for development of any B symptoms  - Trend CBC  - Follow up with oncology, repeat CT as planned in 7/2024     COPD  Tobacco use disorder  PTA smoking ~5 cigarettes per day  - Monitor respiratory status, supplemental O2 by NC PRN to maintain sats >88%  - Continue PTA Breo Ellipta  - Nicotine patch 14 mcg/day  - Ongoing education/resources for cessation     Adjustment disorder with mixed mood  Hx MDD/anxiety (per chart though patient denies)  Delirium, resolved  Not on medications PTA.  Patient denies any hx depression/anxiety but does note depressed mood in setting of recent AKA and significant home stressors.  - Psychology and spiritual services consulted, appreciate assist  - Monitor mood     GERD  PTA on omeprazole 20 mg daily  - Continue pepcid 20 mg q48 hours (renally dosed) given allergy to pantoprazole (formulary sub for omeprazole)     Spongiotic dermatitis  Recently seen by OP dermatology, recommended betamethasone cream BID PRN, not using regularly at hospital  - Consider resumption of topical steroids if recurrent symptoms  - Started Zyrtec  4/25 as above for itching near surgical site     Sacral wound: pressure injury (stage 2 vs 3), incontinence-associated dermatitis, moisture-associated skin damage  Assessed by [facility]OCN on 4/22 at [facility] hospital.  - WOCN consulted for ongoing follow-up at ARU  - Wound care per WOCN orders  - Pressure reduction strategies    Endometrial thickening  Noted incidentally on CT abdomen/pelvis.  Patient denies postmenopausal bleeding.  - PCP to follow-up, consider outpatient pelvic ultrasound to better assess     Adjustment to disability:  Clinical psychology to eval and treat if indicated  FEN: gluten free diet, 1000 mL fluid restriction  Bowel: incontinent, recent loose stools as above  Bladder: incontinent  DVT Prophylaxis: subcutaneous heparin  GI Prophylaxis: pepcid  Code: full, confirmed on admission  Disposition: goal for home though now unclear plan due to need for wheelchair accessible home, family assist; may need to consider TCU referrals   [facility]: pending discharge location  Follow up Appointments on Discharge: PCP in 1-2 weeks, vascular surgery in 2 weeks, [facility], nephrology (ongoing HD), heme/onc ([facility], 7/2024)      35 minutes spent on the date of service doing chart review, history and exam, documentation, and further activities as noted above.    Plan of care was discussed with Dr. Arun Pimentel, PM&R staff physician    Corrina Hurt PA-C  Physical Medicine & Rehabilitation

## 2024-05-10 ENCOUNTER — APPOINTMENT (OUTPATIENT)
Dept: PHYSICAL THERAPY | Facility: CLINIC | Age: 64
DRG: 559 | End: 2024-05-10
Attending: PHYSICAL MEDICINE & REHABILITATION
Payer: COMMERCIAL

## 2024-05-10 ENCOUNTER — APPOINTMENT (OUTPATIENT)
Dept: OCCUPATIONAL THERAPY | Facility: CLINIC | Age: 64
DRG: 559 | End: 2024-05-10
Attending: PHYSICAL MEDICINE & REHABILITATION
Payer: COMMERCIAL

## 2024-05-10 LAB
ALBUMIN SERPL BCG-MCNC: 1.8 G/DL (ref 3.5–5.2)
ANION GAP SERPL CALCULATED.3IONS-SCNC: 3 MMOL/L (ref 7–15)
BUN SERPL-MCNC: 10.7 MG/DL (ref 8–23)
CALCIUM SERPL-MCNC: 7.2 MG/DL (ref 8.8–10.2)
CHLORIDE SERPL-SCNC: 98 MMOL/L (ref 98–107)
CREAT SERPL-MCNC: 2.54 MG/DL (ref 0.51–0.95)
DEPRECATED HCO3 PLAS-SCNC: 27 MMOL/L (ref 22–29)
EGFRCR SERPLBLD CKD-EPI 2021: 20 ML/MIN/1.73M2
GLUCOSE SERPL-MCNC: 78 MG/DL (ref 70–99)
HOLD SPECIMEN: NORMAL
MAGNESIUM SERPL-MCNC: 1.5 MG/DL (ref 1.7–2.3)
MAGNESIUM SERPL-MCNC: 2.4 MG/DL (ref 1.7–2.3)
PHOSPHATE SERPL-MCNC: 2.2 MG/DL (ref 2.5–4.5)
POTASSIUM SERPL-SCNC: 4.2 MMOL/L (ref 3.4–5.3)
SODIUM SERPL-SCNC: 128 MMOL/L (ref 135–145)

## 2024-05-10 PROCEDURE — 97110 THERAPEUTIC EXERCISES: CPT | Mod: GP

## 2024-05-10 PROCEDURE — 97535 SELF CARE MNGMENT TRAINING: CPT | Mod: GO | Performed by: STUDENT IN AN ORGANIZED HEALTH CARE EDUCATION/TRAINING PROGRAM

## 2024-05-10 PROCEDURE — 99232 SBSQ HOSP IP/OBS MODERATE 35: CPT | Performed by: PHYSICAL MEDICINE & REHABILITATION

## 2024-05-10 PROCEDURE — 80069 RENAL FUNCTION PANEL: CPT | Performed by: PHYSICIAN ASSISTANT

## 2024-05-10 PROCEDURE — 250N000011 HC RX IP 250 OP 636: Performed by: PHYSICAL MEDICINE & REHABILITATION

## 2024-05-10 PROCEDURE — 128N000003 HC R&B REHAB

## 2024-05-10 PROCEDURE — 36415 COLL VENOUS BLD VENIPUNCTURE: CPT | Performed by: PHYSICAL MEDICINE & REHABILITATION

## 2024-05-10 PROCEDURE — 250N000013 HC RX MED GY IP 250 OP 250 PS 637: Performed by: PHYSICAL MEDICINE & REHABILITATION

## 2024-05-10 PROCEDURE — 83735 ASSAY OF MAGNESIUM: CPT | Performed by: PHYSICAL MEDICINE & REHABILITATION

## 2024-05-10 PROCEDURE — 97530 THERAPEUTIC ACTIVITIES: CPT | Mod: GP

## 2024-05-10 PROCEDURE — 250N000011 HC RX IP 250 OP 636: Performed by: PHYSICIAN ASSISTANT

## 2024-05-10 PROCEDURE — 250N000013 HC RX MED GY IP 250 OP 250 PS 637: Performed by: PHYSICIAN ASSISTANT

## 2024-05-10 PROCEDURE — 99232 SBSQ HOSP IP/OBS MODERATE 35: CPT | Mod: 24 | Performed by: INTERNAL MEDICINE

## 2024-05-10 RX ORDER — MAGNESIUM SULFATE HEPTAHYDRATE 40 MG/ML
2 INJECTION, SOLUTION INTRAVENOUS ONCE
Status: COMPLETED | OUTPATIENT
Start: 2024-05-10 | End: 2024-05-10

## 2024-05-10 RX ADMIN — ROSUVASTATIN CALCIUM 10 MG: 5 TABLET, COATED ORAL at 20:53

## 2024-05-10 RX ADMIN — BUMETANIDE 4 MG: 1 TABLET ORAL at 11:08

## 2024-05-10 RX ADMIN — FAMOTIDINE 20 MG: 20 TABLET ORAL at 15:55

## 2024-05-10 RX ADMIN — Medication 1 CAPSULE: at 20:53

## 2024-05-10 RX ADMIN — AMLODIPINE BESYLATE 10 MG: 10 TABLET ORAL at 10:57

## 2024-05-10 RX ADMIN — LEVOFLOXACIN 500 MG: 500 TABLET, FILM COATED ORAL at 10:57

## 2024-05-10 RX ADMIN — SENNOSIDES AND DOCUSATE SODIUM 1 TABLET: 8.6; 5 TABLET ORAL at 11:02

## 2024-05-10 RX ADMIN — POLYETHYLENE GLYCOL 3350 17 G: 17 POWDER, FOR SOLUTION ORAL at 11:04

## 2024-05-10 RX ADMIN — CARVEDILOL 6.25 MG: 6.25 TABLET, FILM COATED ORAL at 10:55

## 2024-05-10 RX ADMIN — SENNOSIDES AND DOCUSATE SODIUM 1 TABLET: 8.6; 5 TABLET ORAL at 20:53

## 2024-05-10 RX ADMIN — MAGNESIUM SULFATE HEPTAHYDRATE 2 G: 40 INJECTION, SOLUTION INTRAVENOUS at 11:19

## 2024-05-10 RX ADMIN — CLOPIDOGREL BISULFATE 75 MG: 75 TABLET ORAL at 11:00

## 2024-05-10 RX ADMIN — CARVEDILOL 6.25 MG: 6.25 TABLET, FILM COATED ORAL at 18:47

## 2024-05-10 RX ADMIN — Medication 1 CAPSULE: at 10:58

## 2024-05-10 RX ADMIN — ACETAMINOPHEN 1000 MG: 500 TABLET ORAL at 21:07

## 2024-05-10 RX ADMIN — HEPARIN SODIUM 5000 UNITS: 5000 INJECTION, SOLUTION INTRAVENOUS; SUBCUTANEOUS at 11:15

## 2024-05-10 RX ADMIN — MICONAZOLE NITRATE: 20 POWDER TOPICAL at 20:53

## 2024-05-10 RX ADMIN — HEPARIN SODIUM 5000 UNITS: 5000 INJECTION, SOLUTION INTRAVENOUS; SUBCUTANEOUS at 20:53

## 2024-05-10 RX ADMIN — CETIRIZINE HYDROCHLORIDE 5 MG: 5 TABLET ORAL at 10:56

## 2024-05-10 ASSESSMENT — ACTIVITIES OF DAILY LIVING (ADL)
ADLS_ACUITY_SCORE: 39

## 2024-05-10 NOTE — PROGRESS NOTES
Nephrology Progress Note  05/09/2024         Assessment & Recommendations:   Shirley Hendricks is a 63 year old year old with peripheral artery disease, coronary artery disease and history of MI, hypertension, diabetes mellitus type 2 who is admitted to ARU following hospitalization for occlusion of right LE bypass graft managed with right above-the-knee amputation and complication of acute kidney injury requiring dialysis.     #Acute kidney injury: multifactorial (use of IV contrast 3 days in a row for evaluation and management of her occluded bypass graft, ischemia and possibly rhabdomyolysis) Creatinine level was 0.7 mg/dL prior to this episode. No urine studies available prior to LIMA to assess baseline proteinuria.  -First dialysis run 4/3/2024.  -Access tunneled CVC initially placed 4/3/2024 and revised on 4/15/2024.  - Pursue dialysis Tuesday, Thursday, Saturday for now but continue to watch closely for recovery  - Please obtain daily renal panel to assess signs of recovery of kidney function.    She has a 0.7 mg/dl increase in Cr over 24 hour without dialysis.  This does not suggest a significant/sufficient increase in GFR   - Had dialysis today (5/9/24) per usual schedule with UF 2L.  Tolerated well        #Hyponatremia:   - Patient reports adhering to fluid restriction  - please continue to minimize shifts in serum Na with each dialysis   - hyponatremia partially corrected with dialysis, but will predictably recur until and if her urine output improves signficantly    # Systolic Blood pressure remains suboptimal. Relative bradycardia on carvedilol 6.25 mg twice daily.   On amlodipine 5 mg daily and bumetanide 4 mg on non-dialysis days  Please increase amlodipine to 10 mg daily       #PAD/PVD  #Right common femoral artery to the mid anterior tibial artery bypass graft acute occlusion  #Right above-the-knee amputation 4/9/2024  - Plavix 75 mg daily (previously on Xarelto but no ongoing indication)    "  #Anemia due to acute blood loss with retroperitoneal hematoma and history of GI bleeding in December 2023.  - retacrit 10K units three times weekly with HD  -Received iron sucrose on 4/30     #Coronary artery disease with history of myocardial infarction x 3  #History of Takotsubo cardiomyopathy  Recommend increasing amlodipine to 10 mg daily (patient with relative bradycardia).       #Diabetes mellitus type 2     #Stage Mark marginal zone B-cell lymphoma  - Undergoing surveillance with [place] [facility]     Recommendations were communicated to primary team via note    I spent a total of 30 minutes in reviewing the medical records, face-to-face evaluation and physical exam, review of labs, counseling, orders, care coordination and documentation      Case Cohen MD   Division of Renal Disease and Hypertension  [employer]  Vocera Web Console    Interval History : May 9, 2024    Nursing and provider notes from last 24 hours reviewed.  Patient seen and examined this morning  Reports several voids (subjective increase in volume?).  Denied CP, SOB,.  No N/V  Patient in better spirit today.    Physical Exam:   No intake/output data recorded.   BP (!) 161/68 (BP Location: Left arm)   Pulse 71   Temp 99.2  F (37.3  C) (Oral)   Resp 16   Ht 1.549 m (5' 0.98\")   Wt 44.8 kg (98 lb 12.3 oz)   LMP  (LMP Unknown)   SpO2 96%   BMI 18.67 kg/m       GENERAL APPEARANCE: no distress  EYES:  no scleral icterus, pupils equal  Mouth: dry mucosa  PULM: normal work of breathing.  Lungs clear to lat auscultation  CV: RRR. No rub, gallop  1+ left foot edema  R AKA  INTEGUMENT: no cyanosis, no rash  NEURO:  speech fluent, face symmetric    Labs:   All labs reviewed by me  Electrolytes/Renal -   Recent Labs   Lab Test 05/09/24  0655 05/08/24  0546 05/07/24  0553   * 129* 123*   POTASSIUM 4.7 4.2 4.7   CHLORIDE 96* 96* 90*   CO2 24 24 22   BUN 17.4 11.1 21.1   CR 3.37* 2.66* 3.62*   GLC 75 79 71   SONIA 7.3* 7.4* 7.3*   MAG 1.7 1.9 " 1.9   PHOS 2.7 2.5 2.6       CBC -   Recent Labs   Lab Test 05/09/24  0655 05/07/24  0553 05/02/24  0551   WBC 3.2* 3.3* 3.6*   HGB 9.7* 9.4* 8.7*    202 223       LFTs -   Recent Labs   Lab Test 05/09/24  0655 05/08/24  0546 05/07/24  0553 04/30/24  0653 04/23/24  0602   ALKPHOS  --   --  90 100 96   BILITOTAL  --   --  0.5 0.4 0.5   ALT  --   --  12 24 20   AST  --   --  14 21 16   PROTTOTAL  --   --  4.1* 4.3* 4.1*   ALBUMIN 1.9* 1.9* 1.9* 2.0*  2.0* 1.9*       Iron Panel -   Recent Labs   Lab Test 04/10/24  0559 04/09/24  1817 01/08/24  1542 12/11/23  0550   IRON 22*  --  71 122   IRONSAT 18  --  17 32   BRYN  --  609* 25 23       Current Medications:  Current Facility-Administered Medications   Medication Dose Route Frequency Provider Last Rate Last Admin    amLODIPine (NORVASC) tablet 10 mg  10 mg Oral Daily Corrina Hurt PA-C   10 mg at 05/09/24 1118    bumetanide (BUMEX) tablet 4 mg  4 mg Oral Once per day on Sunday Monday Wednesday Friday Corrina Hurt PA-C   4 mg at 05/08/24 0920    carvedilol (COREG) tablet 6.25 mg  6.25 mg Oral BID w/meals Corrina Hurt PA-C   6.25 mg at 05/09/24 1802    cetirizine (zyrTEC) tablet 5 mg  5 mg Oral Daily Corrina Hurt PA-C   5 mg at 05/08/24 0918    clopidogrel (PLAVIX) tablet 75 mg  75 mg Oral Daily Corrina Hurt PA-C   75 mg at 05/09/24 1119    famotidine (PEPCID) tablet 20 mg  20 mg Oral Q48H Corrina Hurt PA-C   20 mg at 05/08/24 1545    fluticasone-vilanterol (BREO ELLIPTA) 200-25 MCG/ACT inhaler 1 puff  1 puff Inhalation Daily Corrina Hurt PA-C   1 puff at 05/08/24 0919    gabapentin (NEURONTIN) capsule 200 mg  200 mg Oral Once per day on Tuesday Thursday Saturday Corrina Hurt PA-C   200 mg at 05/09/24 2014    heparin ANTICOAGULANT injection 5,000 Units  5,000 Units Subcutaneous Q12H Corrina Hurt PA-C   5,000 Units at 05/09/24 2010    lactobacillus rhamnosus (GG) (CULTURELL) capsule 1 capsule  1  capsule Oral BID Corrina Hurt PA-C   1 capsule at 05/09/24 2010    levofloxacin (LEVAQUIN) tablet 500 mg  500 mg Oral Every Other Day Maya Watkins MD   500 mg at 05/08/24 0919    miconazole (MICATIN) 2 % powder   Topical BID Corrina Hurt PA-C   Given at 05/09/24 2011    multivitamin RENAL (TRIPHROCAPS) capsule 1 capsule  1 capsule Oral Daily Corrina Hurt PA-C   1 capsule at 05/08/24 1200    nicotine (NICODERM CQ) 14 MG/24HR 24 hr patch 1 patch  1 patch Transdermal Daily Corrina Hurt PA-C   1 patch at 05/09/24 0927    polyethylene glycol (MIRALAX) Packet 17 g  17 g Oral Daily Corrina Hurt PA-C   17 g at 05/08/24 0921    rosuvastatin (CRESTOR) tablet 10 mg  10 mg Oral At Bedtime Corrina Hurt PA-C   10 mg at 05/09/24 2010    senna-docusate (SENOKOT-S/PERICOLACE) 8.6-50 MG per tablet 1-2 tablet  1-2 tablet Oral BID Corrina Hurt PA-C   1 tablet at 05/08/24 2058    silver sulfADIAZINE (SILVADENE) 1 % cream   Topical Daily Gala Lo MD   Given at 05/09/24 0929     Current Facility-Administered Medications   Medication Dose Route Frequency Provider Last Rate Last Admin     Case Cohen MD

## 2024-05-10 NOTE — PROGRESS NOTES
[facility] Nurse Inpatient Assessment     Consulted for: Coccyx    Patient History (according to provider note(s):      Per Corrina Hurt PA-C on 4/22/2024: Shirley Hendricks is a 63 year old right hand dominant female with complicated past medical history including but not limited to PAD with multiple prior bilateral lower extremity vascular bypass grafts and thrombectomies (most recently 10/2023 right common femoral to proximal anterior tibial PTFE bypass graft), bilateral Charcot-Breonna-Tooth foot deformity, prior bilateral carotid endarterectomies, B-cell lymphoma, CAD (hx Mix3), hypertension, hyperlipidemia, GI bleed (12/2023), type 2 diabetes mellitus, COPD, tobacco use disorder, iron deficiency anemia, GERD, Celiac disease, Takotsubo cardiomyopathy, SVT, depression/anxiety and spongiotic dermatitis who was admitted on 3/29/24 with acute occlusion of right lower extremity arterial bypass graft now s/p right above-knee amputation 4/1/24, completed 4/9/24 with hospital course complicated by acute renal failure now on dialysis, sepsis, acute blood loss anemia, acute post-operative pain, nausea, loose stools, delirium, malnutrition, and multiple electrolyte derangements.  She is now admitted to ARU on 4/22/24 for multidisciplinary rehabilitation and ongoing medical management.        Admission to acute inpatient rehab 04/22/24.      Assessment:      Areas visualized during today's visit: Coccyx, buttocks    Pressure Injury Location: Sacroccocygeal            5/7    5/10  Last photo: 5/10/24  Wound type: Pressure Injury, Incontinence Associated Dermatitis (IAD), and Moisture Associated Skin Damage (MASD)     Pressure Injury Stage: Unstageable, present on admission (from recent admission at [facility]).      Wound history/plan of care:  MASD related to incontinence of bladder and bowels. intergluteal crease with area of linear erythema, skin is intact. This line of erythema  "continues up gluteal crease to sacral area and evolves to a localized round area of erythema that is blanchable. To the center of this area is an open pressure related injury stage 2 vs 3, unable to determine staging at this time, area is positional over bone and within skin crevice, very little adipose tissue to this area. Base of wound with dermis to edges and yellow tissue. Patient found with brief on in bed, discussed indication for brief use, she agreed to remove due to \" I don't even want to use them but I can't get up because of my leg\". Patient has Right AKA, discussed use of bedpan and/or transferring to commode if able, she was agreeable to try this plan.   4/30: pt prefers to have mepilex in place vs paste for when he is down at therapy. Will change orders and re-evaluate at next visit. Spent much time discussing importance of repositioning q2h   5/6: Wound is undermining, indicating a full thickness wound. Base is quite small with mostly slough at the base and granulation tissue in the undermining aspects.     Wound base: 100 % slough in center, granulation tissue at edges     Palpation of the wound bed: normal      Drainage: none     Description of drainage: none     Measurements (length x width x depth, in cm) 1 x 0.5  x  0.1 cm, undermining 0.2 cm circumferentially     Periwound skin: Intact and Erythema- blanchable      Color: normal and consistent with surrounding tissue      Temperature: normal   Odor: none  Pain: denies , none  Pain intervention prior to dressing change: patient tolerated well, no significant pain present , soaking, and slow and gentle cares   Treatment goal: Heal , Infection control/prevention, Maintain (prevention of deterioration), Protection, and Promote epidermal migration  STATUS: stable  Supplies ordered: at bedside, supplies stored on unit, discussed with RN, and discussed with patient     My PI Risk Assessment     Sensory Perception: 4 - No impairment     Moisture: 3 - " "Occasionally moist      Activity: 2 - Chairfast     Mobility: 3 - Slightly limited      Nutrition: 3 - Adequate     Friction/Shear: 1 - Problem     TOTAL: 16      Treatment Plan:     Sacral wound(s): Every 3 days and PRN if soiled  Cleanse the area with NS and pat dry.  Apply No sting film barrier to periwound skin.  Cover wound with Sacral Mepilex (#821262)- fold like a taco with pointed end towards head for best fit  Ensure pt has Ritchie-cushion (#[id number]) while sitting up in the chair.  Use only one Covidien pad in between mattress and pt.   No brief while in bed.  Add Low air loss pump to Isoflex support surface and rotate side to side when in bed  Strict PIP measures  FYI- If pt has constant incontinent loose stools needing dressing changes Q shift please discontinue the Mepilex dressing and apply criticaid barrier paste BID and PRN.      Pressure Injury Prevention (PIP) Plan:  If patient is declining pressure injury prevention interventions: Explore reason why and address patient's concerns, Educate on pressure injury risk and prevention intervention(s), If patient is still declining, document \"informed refusal\" , and Ensure Care team is aware ( provider, charge nurse, etc)  Mattress: Follow bed algorithm, reassess daily and order specialty mattress, if indicated.  HOB: Maintain at or below 30 degrees, unless contraindicated  Repositioning in bed: Every 1-2 hours , Left/right positioning; avoid supine, Raise foot of bed prior to raising head of bed, to reduce patient sliding down (shear), and Frequent microturns using TAPS wedges, as patient tolerates  Heels: Keep elevated off mattress and Pillows under calves  Protective Dressing: Sacral Mepilex for prevention (#401149),  especially for the agitated patient   Positioning Equipment: TAPS wedges (#376532) to help maintain 30 degree side lying position   Chair positioning: Chair cushion (#[id number]) , Assist patient to reposition hourly, and Do NOT use a donut for " sitting (this increases pressure to smaller area and creates a higher potential for injury)    If patient has a buttock pressure injury, or high risk for PI use chair cushion or SPS.  Moisture Management: Perineal cleansing /protection: Follow Incontinence Protocol, Avoid brief in bed, Clean and dry skin folds with bathing , Consider InterDry (#487560) between folds, and Moisturize dry skin  Under Devices: Inspect skin under all medical devices during skin inspection , Ensure tubes are stabilized without tension, and Ensure patient is not lying on medical devices or equipment when repositioned  Ask provider to discontinue device when no longer needed.      Orders: Reviewed    RECOMMEND PRIMARY TEAM ORDER: None, at this time  Education provided: importance of repositioning, plan of care, wound progress, Infection prevention , Moisture management, Hygiene, and Off-loading pressure  Discussed plan of care with: Patient and Nurse  WOC nurse follow-up plan: twice weekly  Notify WOC if wound(s) deteriorate.  Nursing to notify the Provider(s) and re-consult the WOC Nurse if new skin concern.    DATA:     Current support surface: Standard  Standard gel/foam mattress (IsoFlex, Atmos air, etc)  Containment of urine/stool: Incontinence Protocol, Brief, Incontinent pad in bed, and recommending NICOLE pump be added to isolflex support surface for moisture management  BMI: Body mass index is 18.67 kg/m .   Active diet order: Orders Placed This Encounter      Gluten Free Diet     Output: I/O last 3 completed shifts:  In: 120 [P.O.:120]  Out: 2 [Other:2]     Labs:   Recent Labs   Lab 05/10/24  0607 05/09/24  0655   ALBUMIN 1.8* 1.9*   HGB  --  9.7*   WBC  --  3.2*     Pressure injury risk assessment:   Sensory Perception: 3-->slightly limited  Moisture: 3-->occasionally moist  Activity: 2-->chairfast  Mobility: 2-->very limited  Nutrition: 2-->probably inadequate  Friction and Shear: 2-->potential problem  Cesar Score: 14      Xenia  Gita RN BSN CWOCN  Available via [employer] baljinder: [facility] [place]  Office *[phone]

## 2024-05-10 NOTE — PLAN OF CARE
Discharge Planner Post-Acute Rehab OT:      Discharge Plan: home with assist and HH OT      Precautions: fall, NWB of RLE, dialysis, sacral wound, elevate residual limb when in bed     Current Status:  ADLs:  Mobility: mod I for slide board to WC from bed  Grooming: IND seated at sink in w/c  Dressing: UB: IND, LB: IND in supine, IND for brace and shoe  Bathing: TBD  Toileting: SB to commode Ax1, assit for lydia cares and clothing depending on continence  IADLs: Pt does most IADL including caregiving for .   Vision/Cognition: intact     Assessment: Family training completed with brother. Pt's brother is able to assist as needed and therapist placed an emphasis on incontinence cares. Per discussion, the pt will not be home alone too much between her brother and son's different schedules. Pt does not have her commode yet but it has been ordered. TCU referrals have been placed as well.       Other Barriers to Discharge (DME, Family Training, etc): DME, family training, pt has support from multiple family members and will need to schedule family training since she will most likely still require assist with toileting.     5/4 Pt states her H is staying with his sister and she hopes this will con't.  She reports her brother will help her but she would like to be IND with toileting as he is not likely to assist her with toileting.

## 2024-05-10 NOTE — PROGRESS NOTES
[facility]   Acute Rehabilitation Unit  Daily progress note    INTERVAL HISTORY  Shirley Hendricks was seen and examined at bedside this morning.  No acute events reported overnight.  During my assessment patient called her sister to have a three-way conversation.  Sister expressed concern about patient's ongoing diarrhea.  I reported that the x-ray from yesterday showed improvement in the stool burden of the guts.  I also discussed the plan to continue with enemas and oral laxatives.  I mention based on her electrolytes and being on hemodialysis I cannot be as aggressive with her bowel regiment as I would like to be.  I mentioned I would anticipate that her diarrhea would improve as it appears to be an obstructive diarrhea.  There are no signs indicating any emergency or urgency and this could also be handled from a home basis.  Having said that I have mentioned that patient would benefit from going to a TCU in order to facilitate transitioning to her home given that she does not have any current supports.  Her sister and patient were appreciative of this plan and are hoping that a facility would accept patient.  Otherwise no complaints were verbalized on today's visit.    Functional Update:     PT:  Current Status:  Bed Mobility: Humaira   Transfer: Humaira slideboard bed<>w/c, Siddharth for commode.   Gait: unable, unsafe  Stairs: not tested  Balance: Stable dynamic sitting.      Assessment: Family training completed w/ brother Alan and successful. Per discussion w/ pt and brother they seem to have safe plan in place to assist pt at home. Pt will be home alone for very few hours at a time during the week.     OT:  Current Status:  ADLs:  Mobility: mod I for slide board to WC from bed  Grooming: IND seated at sink in w/c  Dressing: UB: IND, LB: IND in supine, IND for brace and shoe  Bathing: TBD  Toileting: SB to commode Ax1, assit for lydia cares and clothing depending on  continence  IADLs: Pt does most IADL including caregiving for .   Vision/Cognition: intact     Assessment: Family training completed with brother. Pt's brother is able to assist as needed and therapist placed an emphasis on incontinence cares. Per discussion, the pt will not be home alone too much between her brother and son's different schedules. Pt does not have her commode yet but it has been ordered. TCU referrals have been placed as well.       MEDICATIONS  Current Facility-Administered Medications   Medication Dose Route Frequency Provider Last Rate Last Admin    amLODIPine (NORVASC) tablet 10 mg  10 mg Oral Daily Corrina Hurt PA-C   10 mg at 05/10/24 1057    bumetanide (BUMEX) tablet 4 mg  4 mg Oral Once per day on Sunday Monday Wednesday Friday Corrina Hurt PA-C   4 mg at 05/10/24 1108    carvedilol (COREG) tablet 6.25 mg  6.25 mg Oral BID w/meals Corrina Hurt PA-C   6.25 mg at 05/10/24 1055    cetirizine (zyrTEC) tablet 5 mg  5 mg Oral Daily Corrina Hurt PA-C   5 mg at 05/10/24 1056    clopidogrel (PLAVIX) tablet 75 mg  75 mg Oral Daily Corrina Hurt PA-C   75 mg at 05/10/24 1100    famotidine (PEPCID) tablet 20 mg  20 mg Oral Q48H Corrina Hurt PA-C   20 mg at 05/08/24 1545    fluticasone-vilanterol (BREO ELLIPTA) 200-25 MCG/ACT inhaler 1 puff  1 puff Inhalation Daily Corrina Hurt PA-C   1 puff at 05/08/24 0919    gabapentin (NEURONTIN) capsule 200 mg  200 mg Oral Once per day on Tuesday Thursday Saturday Corrina Hurt PA-C   200 mg at 05/09/24 2014    heparin ANTICOAGULANT injection 5,000 Units  5,000 Units Subcutaneous Q12H Corrina Hurt PA-C   5,000 Units at 05/10/24 1115    lactobacillus rhamnosus (GG) (CULTURELL) capsule 1 capsule  1 capsule Oral BID Corrina Hurt PA-C   1 capsule at 05/10/24 1058    levofloxacin (LEVAQUIN) tablet 500 mg  500 mg Oral Every Other Day Maya Watkins MD   500 mg at 05/10/24 1052     miconazole (MICATIN) 2 % powder   Topical BID Corrina Hurt PA-C   Given at 05/09/24 2011    multivitamin RENAL (TRIPHROCAPS) capsule 1 capsule  1 capsule Oral Daily Corrina Hurt PA-C   1 capsule at 05/08/24 1200    nicotine (NICODERM CQ) 14 MG/24HR 24 hr patch 1 patch  1 patch Transdermal Daily Corrina Hurt PA-C   1 patch at 05/09/24 0927    polyethylene glycol (MIRALAX) Packet 17 g  17 g Oral Daily Corrina Hurt PA-C   17 g at 05/10/24 1104    rosuvastatin (CRESTOR) tablet 10 mg  10 mg Oral At Bedtime Corrina Hurt PA-C   10 mg at 05/09/24 2010    senna-docusate (SENOKOT-S/PERICOLACE) 8.6-50 MG per tablet 1-2 tablet  1-2 tablet Oral BID Corrina Hurt PA-C   1 tablet at 05/10/24 1102    silver sulfADIAZINE (SILVADENE) 1 % cream   Topical Daily Gala Lo MD   Given at 05/09/24 0929          Current Facility-Administered Medications   Medication Dose Route Frequency Provider Last Rate Last Admin    acetaminophen (TYLENOL) tablet 500-1,000 mg  500-1,000 mg Oral Q6H PRN Corrina Hurt PA-C   1,000 mg at 05/09/24 2011    [Held by provider] diphenoxylate-atropine (LOMOTIL) 2.5-0.025 MG per tablet 1 tablet  1 tablet Oral 4x Daily PRN Corrina Hurt PA-C   1 tablet at 04/30/24 1538    HYDROmorphone (DILAUDID) tablet 2 mg  2 mg Oral Q6H PRN Corrina Hurt PA-C   2 mg at 05/07/24 2134    naloxone (NARCAN) injection 0.2 mg  0.2 mg Intravenous Q2 Min PRN Arun Pimentel MD        Or    naloxone (NARCAN) injection 0.4 mg  0.4 mg Intravenous Q2 Min PRN Arun Pimentel MD        Or    naloxone (NARCAN) injection 0.2 mg  0.2 mg Intramuscular Q2 Min PRN Arun Pimentel MD        Or    naloxone (NARCAN) injection 0.4 mg  0.4 mg Intramuscular Q2 Min PRN Arun Pimentel MD        nitroGLYcerin (NITROSTAT) sublingual tablet 0.4 mg  0.4 mg Sublingual Q5 Min PRN Corrina Hurt PA-C        ondansetron (ZOFRAN ODT) ODT tab 4 mg  4 mg Oral Q6H PRN Corrina Hurt PA-C   4 mg  "at 05/09/24 0936    polyethylene glycol (MIRALAX) powder 17 g  17 g Oral Daily PRN Corrina Hurt PA-C            PHYSICAL EXAM  /58 (BP Location: Left arm)   Pulse 67   Temp 97.7  F (36.5  C) (Oral)   Resp 16   Ht 1.549 m (5' 0.98\")   Wt 44.8 kg (98 lb 12.3 oz)   LMP  (LMP Unknown)   SpO2 94%   BMI 18.67 kg/m    Gen: NAD, laying comfortably in bed  HEENT: NC/AT, MMM, EOMI, PERRLA  Cardio: RRR, no M/R  Pulm: CTA bilaterally non-labored on room air  Abd: soft, non-tender, non-distended  Ext: some edema in RLE; charcot foot deformity on left; R AKA   Neuro/MSK: Awake, alert, moving all extremities actively in bed      LABS  CBC RESULTS:   Recent Labs   Lab Test 05/09/24  0655 05/07/24  0553 05/02/24  0551   WBC 3.2* 3.3* 3.6*   RBC 3.20* 3.13* 2.80*   HGB 9.7* 9.4* 8.7*   HCT 30.8* 29.4* 26.9*   MCV 96 94 96   MCH 30.3 30.0 31.1   MCHC 31.5 32.0 32.3   RDW 17.6* 17.5* 18.4*    202 223       Last Basic Metabolic Panel:  Recent Labs   Lab Test 05/10/24  0607 05/09/24  0655 05/08/24  0546   * 126* 129*   POTASSIUM 4.2 4.7 4.2   CHLORIDE 98 96* 96*   CO2 27 24 24   ANIONGAP 3* 6* 9   GLC 78 75 79   BUN 10.7 17.4 11.1   CR 2.54* 3.37* 2.66*   GFRESTIMATED 20* 14* 19*   SONIA 7.2* 7.3* 7.4*         Rehabilitation - continue comprehensive acute inpatient rehabilitation program with multidisciplinary approach including therapies, rehab nursing, and physiatry following. See interval history for updates.      ASSESSMENT AND PLAN  Shirley Hendricks is a 63 year old right hand dominant female with complicated past medical history including but not limited to PAD with multiple prior bilateral lower extremity vascular bypass grafts and thrombectomies (most recently 10/2023 right common femoral to proximal anterior tibial PTFE bypass graft), bilateral Charcot-Breonna-Tooth foot deformity, prior bilateral carotid endarterectomies, B-cell lymphoma, CAD (hx Mix3), hypertension, hyperlipidemia, GI bleed " (12/2023), type 2 diabetes mellitus, COPD, tobacco use disorder, iron deficiency anemia, GERD, Celiac disease, Takotsubo cardiomyopathy, SVT, depression/anxiety and spongiotic dermatitis who was admitted on 3/29/24 with acute occlusion of right lower extremity arterial bypass graft now s/p right above-knee amputation 4/1/24, completed 4/9/24 with hospital course complicated by acute renal failure now on dialysis, sepsis, acute blood loss anemia, acute post-operative pain, nausea, loose stools, delirium, malnutrition, and multiple electrolyte derangements.  She is now admitted to ARU on 4/22/24 for multidisciplinary rehabilitation and ongoing medical management.        Admission to acute inpatient rehab 04/22/24.    Impairment group code: Amputation 05.3 Unilateral LE AKA; emergent R AKA due to acute occlusion of RLE bypass graft         PT and OT 90 minutes of each daily for 6 days per week in addition to rehab nursing and close management of physiatrist.       Impairment of ADL's: Noted to have impaired activity tolerance, impaired balance, impaired strength, impaired weight shifting, and pain, all affecting her ability to safely and independently perform basic ADLs.  Goal for mod I with basic wheelchair-based ADLs with assist for bathing, as well as assist IADLs/heavier activities.     Impairment of mobility:  Noted to have impaired activity tolerance, impaired balance, impaired strength, impaired weight shifting, and pain, all affecting her ability to safely and independently perform basic mobility.  Goal for mod I with basic wheelchair-based mobility.     Impairment of cognition/language/swallow:  Noted to have dysphagia with goals for safe tolerance of least restrictive diet.  However, IP SLP noting did not anticipate further SLP services at ARU, no issues with tolerating regular diet.  Will not consult initially.  If any concern for difficulty tolerating current diet, can consult.     Medical Conditions  New  actions/orders/updates for today are in blue.     S/p right AKA 4/1/24, completed 4/9/24 due to critical limb ischemia, acute occlusion of right common femoral artery to mid anterior tibial artery bypass graft   PAD/PVD with hx multiple prior vascular interventions to BLE  Bilateral Charcot-Breonna-Tooth foot deformities  Acute post-op pain  - Wound care: daily dressing changes to right AKA with xeroform and gauze with kerlix to keep wound protected  - Dehiscence of lateral surgical incision with some increased serosanguinous drainage.  Also with multiple areas with necrotic appearance.  Overall similar to day of ARU admission although with more drainage.  Vascular surgery consulted/assessed on 4/28.  Appreciate assist.  Recommended ongoing daily dressing changes with Xeroform, gauze fluffs, loosely wrapped Kerlix.  DO NOT use ace wrap.  Stockinette can be placed into over-the-shoulder sling to prevent right AKA dressing from falling off during therapies.  Silvadene topical also added per vascular surgery on 4/29, to be applied daily to incision.  Some increased erythema noted at surgical site on 5/2.  Vascular surgery reassessed 5/3 and felt overall appearance not concerning.  Will likely eventually need debridement of anterior thigh but that will be deferred/evaluated at OP follow-up with Dr. Hernandez.  No indication for antibiotics at this time.  Continue current plan of care.  - Also with significant itching at surgical site.  Recently seen by derm for dermatitis as below and recommended to trial zyrtec or claritin for itching.  Started zyrtec 5 mg daily on 4/25.  - NWB RLE  - Continue PTA L foot custom orthotic   - Continue Plavix 75 mg daily (given hx left bypass).  No ongoing need for Xarelto per vascular (no recent DVT or PE)  - Pain management: APAP 500-1000 mg q6h PRN, dilaudid 2 mg q6h PRN, gabapentin 200 mg 3x/week after HD (renally dosed).  Wean opioids as able.   - Continue PT/OT  - Follow up with vascular  surgery in 2-4 weeks (~5/28)     Acute blood loss anemia on anemia of chronic disease, hx iron deficiency  Retroperitoneal hematoma  Recent GI bleed (hospitalized 12/2023)  Required intermittent transfusion during this admission (3/30, 3/31, 4/2, 4/6, 4/9, 4/13).  Repeat CT abdomen on 4/20 with stable right retroperitoneal hematoma; no s/o active bleed.  5/9: Hgb stable at 9.7  - Repeat CT abd/pelvis on 5/2 demonstrated decreased small right retroperitoneal hematoma (iatrogenic)  - Continue epo/venofer with HD per nephrology  - Trend CBC every T/Th/Sat  - Transfuse for Hgb <7     Acute renal failure on HD  Hyponatremia  Normal baseline Cr, though per nephrology, suspect some modest CKD.  LIMA this admission, up to peak Cr 4/22 on 4/3.  Likely BAILEY +/- rhabdo +/- ischemic injury with no signs of recovery and now dialysis dependent (started 4/3/24).  S/p tunneled dialysis catheter placement 4/3/24.  5/10: CR 2.54, , BUN 10.7  - Nephrology consulted, appreciate ongoing assistance  - HD T/Th/Sat at ARU or per nephrology recs.  Plan for OP HD at [facility] T/Th/Sat  - S/p diuretic challenge (Lasix 100 mg PO) on 4/29 per nephrology without improved output.  Evidence of fluid overload on CT abd/pelv 5/2 with mod pleural effusions, small volume ascites, diffuse soft tissue anasarca.  Per nephrology, recommended Bumex 4 mg daily on non-dialysis days, started on 5/6.  - Daily renal panel for now per nephrology  - Continue 1L fluid restriction  - Avoid NSAIDs/nephrotoxins, renally dose medications     Bilateral pleural effusion  Noted on CT abdomen 4/20 with moderate b/l pleural effusion (left>right).  Has been intermittently requiring 1-2L supplemental oxygen.  Unclear if related to volume given new renal failure on HD, also baseline COPD.  Repeat CT abd/pelv on 5/2 with moderate pleural effusions  - Monitor respiratory status, supplemental O2 by NC PRN to maintain sats >88%  - Consider thoracentesis if  "symptomatic or worsening hypoxia      Celiac disease  Colon distension   Loose stools, improving  Nausea/vomiting, improving  Severe malnutrition in context of acute on chronic illness  Pill cam study with [facility] in 3/2024 (due to recent GI bleed), which revealed mild non-erosive red tissue in the duodenum, some atrophic type appearance that could be celiac disease.  This was followed by celiac labs on 3/20/24 (Gliadin ab and tTG IgA antibody) which were positive and thus Celiac diagnosis was established and she was recommended for gluten-free diet.  This admission, noted to have colonic distension and circumferential wall thickening of the distal colon and rectum concerning for proctocolitis on CT abdomen.  Also with loose stools, nausea.  Despite this, patient declined gluten-free diet (switched on 4/18).  Noted to have improvement in loose stools and nausea at discharge to ARU per sending team.  However, has persisted with loose stools, intermittent nausea and vomiting.  Repeat cdiff on 4/28 negative.  Suspect symptoms are due to Celiac disease and non-compliance with gluten-free diet.  Patient agreeable to trial of gluten-free diet started on 4/28.  Due to recurrent nausea/emesis, ongoing loose stools, + new rebound/guarding in right abdomen, CT abdomen/pelvis obtained on 5/2 which showed \"persistent wall thickening of the descending and sigmoid colon in addition to the rectum, similar to prior study. This may also be due  to overall third spacing of fluid versus colitis given diarrhea history. Large volume of stool.\"  Loose stools could represent obstructive diarrhea.  Lomotil held, scopolamine stopped.  Started on bowel protocol.  Improvement in abdominal pain, nausea, vomiting.  Ongoing loose stools but improving in consistency per patient, frequency varies and continues to cause incontinence.  AXR with nonobstructive bowel gas pattern, moderate colonic stool burden, overall not significantly changed.  S/p " enema on 5/6 with several subsequent loose Bms.  Daily Miralax added 5/7.  - Continue gluten-free diet  - RD consulted, appreciate assistance  - AXR yesterday shows improvement on stool burden.  Will continue with current plan and will monitor.  - Continue senokot-S 1-2 tabs BID scheduled  - Continue Miralax daily  - PRN Zofran  - Continue probiotics BID  - Monitor symptoms  - Follow up with [facility] as outpatient     Abnormal UA  Given ongoing nausea/vomiting and abdominal/pelvic pain, as well as bladder wall thickening on CT, UA obtained to rule out UTI.   Results with large proteinuria, negative nitrites, small hematuria, large leuk esterase, many bacteria.  UC growing >100k colonies candida and 10-50k colonies of E. Coli.  Started on levofloxacin.  - Continue levofloxacin 500 mg every other day (thru 5/12)  - No clear urinary symptoms.     Hypomagnesemia  Replaced mag IV intermittently.  5/10 mag increased to 2.4 from previous 1.7  - Continue to trend     Hypocalcemia  5/7: Ca corrects to 8.6 (mildly low) for hypoalbuminemia.  Have reached out to nephrology to ask about resuming supplement but without response.  - Per pharmacy, was on PTA calcium carbonate/vit D PTA (had run out 1 week before admission), not ordered while at hospital.   - Continue to trend     CAD (hx MI x3)  HTN  Hyperlipidemia  Hx Takotsubo CM  Hx SVT  Last coronary angiogram completed 10/2023.  Recovered EF (55-60%) from ECHO 11/2023.  - Continue PTA Coreg 6.25 mg BID  - Continue amlodipine 10 mg daily (increased 5/8 per nephrology)  - Continue Bumex 4 mg on non-dialysis days (added 5/6 per nephrology)  - Hold PTA lisinopril 10 mg daily due to renal failure  - PTA Jardiance 10 mg daily discontinued due to renal failure  - Rosuvastatin 10 mg daily resumed on ARU admission (previously held due to concern for rhabdo).  5/7: repeat hepatic panel WNL (with exception of low total protein)    - Monitor BP  - Was to follow up with cardiology in Feb 2024,  should be scheduled after discharge     Murmur  Noted on exam this admission by attending physiatrist.  Per chart review, intermittently documented in the past with questionable/subtle murmur (though not by cardiology).  Most recent echo 11/7/23 with trace mitral regurg, trace tricuspid regurg, and moderate trileaflet aortic sclerosis.  - Folllow up with cardiology as above     Hx bilateral carotid artery stenosis s/p bilateral carotid endarterectomies  - Follow up with vascular surgery for annual carotid duplex (last done on 1/4/24 with stable stenosis of right carotid bulb)     Unclear hx Diabetes mellitus, type II  Listed as diagnosis per chart review.  However, family reports that patient has never been diagnosed with diabetes and no A1c that is available in her chart reflects diabetic range.  A1c this admission 5.2%.  PTA Jardiance and gabapentin discontinued due to acute renal failure.  BG well-controlled during this admission without need for insulin.  - Monitor BG periodically with labs     B-cell lymphoma, stage VALENTIN  Followed by [facility] (Dr. Barrow).  Mild radiographic progression on CT 1/26/24.  Given asymptomatic, plans at that time for ongoing CT monitoring.  - Monitor for development of any B symptoms  - Trend CBC  - Follow up with oncology, repeat CT as planned in 7/2024     COPD  Tobacco use disorder  PTA smoking ~5 cigarettes per day  - Monitor respiratory status, supplemental O2 by NC PRN to maintain sats >88%  - Continue PTA Breo Ellipta  - Nicotine patch 14 mcg/day  - Ongoing education/resources for cessation     Adjustment disorder with mixed mood  Hx MDD/anxiety (per chart though patient denies)  Delirium, resolved  Not on medications PTA.  Patient denies any hx depression/anxiety but does note depressed mood in setting of recent AKA and significant home stressors.  - Psychology and spiritual services consulted, appreciate assist  - Monitor mood     GERD  PTA on omeprazole 20 mg daily  -  Continue pepcid 20 mg q48 hours (renally dosed) given allergy to pantoprazole (formulary sub for omeprazole)     Spongiotic dermatitis  Recently seen by OP dermatology, recommended betamethasone cream BID PRN, not using regularly at hospital  - Consider resumption of topical steroids if recurrent symptoms  - Started Zyrtec 4/25 as above for itching near surgical site     Sacral wound: pressure injury (stage 2 vs 3), incontinence-associated dermatitis, moisture-associated skin damage  Assessed by [facility]OC[facility] on 4/22 at [facility] hospital.  - WOCN consulted for ongoing follow-up at ARU  - Wound care per WOCN orders  - Pressure reduction strategies     Endometrial thickening  Noted incidentally on CT abdomen/pelvis.  Patient denies postmenopausal bleeding.  - PCP to follow-up, consider outpatient pelvic ultrasound to better assess     Adjustment to disability:  Clinical psychology to eval and treat if indicated  FEN: gluten free diet, 1000 mL fluid restriction  Bowel: incontinent, recent loose stools as above  Bladder: incontinent  DVT Prophylaxis: subcutaneous heparin  GI Prophylaxis: pepcid  Code: full, confirmed on admission  Disposition: Awaiting TCU acceptance  ELOS: pending discharge location  Follow up Appointments on Discharge: PCP in 1-2 weeks, vascular surgery in 2 weeks, [facility], nephrology (ongoing HD), heme/onc ([facility], 7/2024)    ELANA IRAHETA, spent over 35 minutes face-to-face or managing the care of this patient.     Patient was discussed with PM&R team     Elana Pimentel MD   Physical Medicine & Rehabilitation

## 2024-05-10 NOTE — PLAN OF CARE
Goal Outcome Evaluation:      Plan of Care Reviewed With: patient    Overall Patient Progress: improving      Alert and oriented x4, make needs known and is call light appropriate. Denies CP or SOB. Phantom leg pain and headache managed with tylenol. On HD TThS.  CVC right chest clean and intact. Incontinent of bowel. Gauze dressing to right AKA clean, dry and intact. Sacral mepilex changed. Micatin powder applied to the groin. Bed alarm on for safety.

## 2024-05-10 NOTE — PLAN OF CARE
Discharge Plan: home with HH PT vs TCU     Precautions: fall, NWB of RLE, sacral wound, elevate RLE in bed    Current Status:  Bed Mobility: Humaira   Transfer: Humaira slideboard bed<>w/c, Siddharth for commode.   Gait: unable, unsafe  Stairs: not tested  Balance: Stable dynamic sitting.     Assessment: Family training completed w/ brother Alan and lucille. Per discussion w/ pt and brother they seem to have safe plan in place to assist pt at home. Pt will be home alone for very few hours at a time during the week. Patient's w/c from [employer] did arrive, pending confirmation of pt's discharge location, plan to call [employer] on Monday.     Other Barriers to Discharge (DME, Family Training, etc):   DME: [employer] w/c (delivery scheduled for 5/10), slideboard and ramp (emailed handouts to sister)    Family training: Completed w/  on 5/10.

## 2024-05-10 NOTE — PROGRESS NOTES
VS: VSS   O2: 96% AT RA   Output: SEE I AND O FLOW SHEET   Last BM: 5/9/24   Activity: MOD IND BETWEEN CHAIR AND BED WITH SB. ASSIST OF 1 TO THE COMMODE WITH SB   Skin: R AKA, REDNESS IN GROIN, MEPILEX DRESSING ON COCCYX   Pain: DENIES PAIN   CMS: INTACT   Dressing: R AKA   Diet: REGULAR/ THIN   LDA: CVC LINE, RIGHT CHEST   Equipment: WHEEL CHAIR, SLIDING BOARD   Plan: WE WILL CONTINUE WITH CURRENT PLAN OF CARE   Additional Info:

## 2024-05-10 NOTE — PLAN OF CARE
Goal Outcome Evaluation:      Plan of Care Reviewed With: patient    Overall Patient Progress: improvingOverall Patient Progress: improving     Pt stated she was tired after morning , just wanted to rest. Cont with present care.

## 2024-05-10 NOTE — PROGRESS NOTES
Follow up on TCU referrals:    Pending:  [facility]  [address] [place] [address]  Ph: [phone]  F: [phone]  5.9: referral sent  5.10: Resent per request    [facility]  [address] [place]  [address]  P: [phone]  F: [phone]  5.9: referral sent  5.10: Still need to review    [facility]  [address] [place] [place]  [address]  P: [phone]  F: [phone]  5.9: referral sent  5.10: [facility]  [address] [place]  [address]  P: [phone]  F: [phone]  5.9: referral sent  5.10: [facility]  [address] [facility] [address]  P: [phone]   Admissions ([facility]): [phone]  F: [phone]  5.9: referral sent  5.10: [facility]    DAWN Carnes BSW  Float   Covering ARU: [phone]

## 2024-05-11 ENCOUNTER — APPOINTMENT (OUTPATIENT)
Dept: PHYSICAL THERAPY | Facility: CLINIC | Age: 64
DRG: 559 | End: 2024-05-11
Attending: PHYSICAL MEDICINE & REHABILITATION
Payer: COMMERCIAL

## 2024-05-11 ENCOUNTER — APPOINTMENT (OUTPATIENT)
Dept: OCCUPATIONAL THERAPY | Facility: CLINIC | Age: 64
DRG: 559 | End: 2024-05-11
Attending: PHYSICAL MEDICINE & REHABILITATION
Payer: COMMERCIAL

## 2024-05-11 LAB
ALBUMIN SERPL BCG-MCNC: 2 G/DL (ref 3.5–5.2)
ANION GAP SERPL CALCULATED.3IONS-SCNC: 8 MMOL/L (ref 7–15)
BUN SERPL-MCNC: 16.8 MG/DL (ref 8–23)
CALCIUM SERPL-MCNC: 7.1 MG/DL (ref 8.8–10.2)
CHLORIDE SERPL-SCNC: 97 MMOL/L (ref 98–107)
CREAT SERPL-MCNC: 3.14 MG/DL (ref 0.51–0.95)
DEPRECATED HCO3 PLAS-SCNC: 25 MMOL/L (ref 22–29)
EGFRCR SERPLBLD CKD-EPI 2021: 16 ML/MIN/1.73M2
GLUCOSE BLDC GLUCOMTR-MCNC: 131 MG/DL (ref 70–99)
GLUCOSE SERPL-MCNC: 69 MG/DL (ref 70–99)
HOLD SPECIMEN: NORMAL
MAGNESIUM SERPL-MCNC: 2 MG/DL (ref 1.7–2.3)
PHOSPHATE SERPL-MCNC: 2.6 MG/DL (ref 2.5–4.5)
POTASSIUM SERPL-SCNC: 4.5 MMOL/L (ref 3.4–5.3)
SODIUM SERPL-SCNC: 130 MMOL/L (ref 135–145)

## 2024-05-11 PROCEDURE — 128N000003 HC R&B REHAB

## 2024-05-11 PROCEDURE — 90935 HEMODIALYSIS ONE EVALUATION: CPT

## 2024-05-11 PROCEDURE — 258N000003 HC RX IP 258 OP 636: Performed by: INTERNAL MEDICINE

## 2024-05-11 PROCEDURE — 83735 ASSAY OF MAGNESIUM: CPT | Performed by: PHYSICIAN ASSISTANT

## 2024-05-11 PROCEDURE — 250N000011 HC RX IP 250 OP 636: Performed by: PHYSICIAN ASSISTANT

## 2024-05-11 PROCEDURE — 97530 THERAPEUTIC ACTIVITIES: CPT | Mod: GP

## 2024-05-11 PROCEDURE — 36415 COLL VENOUS BLD VENIPUNCTURE: CPT | Performed by: PHYSICIAN ASSISTANT

## 2024-05-11 PROCEDURE — 80069 RENAL FUNCTION PANEL: CPT | Performed by: PHYSICIAN ASSISTANT

## 2024-05-11 PROCEDURE — 634N000001 HC RX 634: Mod: JZ | Performed by: INTERNAL MEDICINE

## 2024-05-11 PROCEDURE — 250N000013 HC RX MED GY IP 250 OP 250 PS 637: Performed by: PHYSICIAN ASSISTANT

## 2024-05-11 PROCEDURE — 99233 SBSQ HOSP IP/OBS HIGH 50: CPT | Mod: 24 | Performed by: STUDENT IN AN ORGANIZED HEALTH CARE EDUCATION/TRAINING PROGRAM

## 2024-05-11 PROCEDURE — 97535 SELF CARE MNGMENT TRAINING: CPT | Mod: GO

## 2024-05-11 PROCEDURE — 99231 SBSQ HOSP IP/OBS SF/LOW 25: CPT | Performed by: PHYSICAL MEDICINE & REHABILITATION

## 2024-05-11 RX ADMIN — CETIRIZINE HYDROCHLORIDE 5 MG: 5 TABLET ORAL at 09:58

## 2024-05-11 RX ADMIN — MICONAZOLE NITRATE: 20 POWDER TOPICAL at 21:55

## 2024-05-11 RX ADMIN — SENNOSIDES AND DOCUSATE SODIUM 1 TABLET: 8.6; 5 TABLET ORAL at 10:10

## 2024-05-11 RX ADMIN — NICOTINE 1 PATCH: 14 PATCH, EXTENDED RELEASE TRANSDERMAL at 10:02

## 2024-05-11 RX ADMIN — EPOETIN ALFA-EPBX 4000 UNITS: 10000 INJECTION, SOLUTION INTRAVENOUS; SUBCUTANEOUS at 17:14

## 2024-05-11 RX ADMIN — CLOPIDOGREL BISULFATE 75 MG: 75 TABLET ORAL at 09:57

## 2024-05-11 RX ADMIN — HEPARIN SODIUM 5000 UNITS: 5000 INJECTION, SOLUTION INTRAVENOUS; SUBCUTANEOUS at 10:11

## 2024-05-11 RX ADMIN — MICONAZOLE NITRATE: 20 POWDER TOPICAL at 10:01

## 2024-05-11 RX ADMIN — CARVEDILOL 6.25 MG: 6.25 TABLET, FILM COATED ORAL at 21:11

## 2024-05-11 RX ADMIN — SODIUM CHLORIDE 250 ML: 9 INJECTION, SOLUTION INTRAVENOUS at 17:13

## 2024-05-11 RX ADMIN — SILVER SULFADIAZINE: 10 CREAM TOPICAL at 10:19

## 2024-05-11 RX ADMIN — HEPARIN SODIUM 5000 UNITS: 5000 INJECTION, SOLUTION INTRAVENOUS; SUBCUTANEOUS at 21:54

## 2024-05-11 RX ADMIN — CARVEDILOL 6.25 MG: 6.25 TABLET, FILM COATED ORAL at 09:58

## 2024-05-11 RX ADMIN — SODIUM CHLORIDE 300 ML: 9 INJECTION, SOLUTION INTRAVENOUS at 17:13

## 2024-05-11 RX ADMIN — Medication 1 CAPSULE: at 10:10

## 2024-05-11 RX ADMIN — FLUTICASONE FUROATE AND VILANTEROL TRIFENATATE 1 PUFF: 200; 25 POWDER RESPIRATORY (INHALATION) at 10:10

## 2024-05-11 RX ADMIN — GABAPENTIN 200 MG: 100 CAPSULE ORAL at 21:54

## 2024-05-11 RX ADMIN — ROSUVASTATIN CALCIUM 10 MG: 5 TABLET, COATED ORAL at 21:11

## 2024-05-11 RX ADMIN — Medication 1 CAPSULE: at 21:11

## 2024-05-11 RX ADMIN — Medication 1 CAPSULE: at 13:01

## 2024-05-11 RX ADMIN — AMLODIPINE BESYLATE 10 MG: 10 TABLET ORAL at 09:57

## 2024-05-11 ASSESSMENT — ACTIVITIES OF DAILY LIVING (ADL)
ADLS_ACUITY_SCORE: 39

## 2024-05-11 NOTE — PLAN OF CARE
Goal Outcome Evaluation:      Plan of Care Reviewed With: patient    Overall Patient Progress: no change    Orientation: Alert and oriented  Bowel: Incontinent LBM: 5/10  Bladder: on hemodialysis  Pain: phantom limb pain; PRN Tylenol given once  Ambulation/Transfers: JAY between chair and bed; Min A1 to commode  Diet/Liquids: tolerating diet well; took pills whole with thin liquids. On 1L Fluid Restriction  Tubes/Lines/Drains: PIV L; dressing CDI; CVC on R chest  Skin: R BKA; dressing CDI; sacral wound dressing CDI    Uses call light appropriately. Able to make needs known. Safety ensured at all times.Continue POC

## 2024-05-11 NOTE — PLAN OF CARE
Goal Outcome Evaluation:    Overall Patient Progress: no change    Outcome Evaluation: No change in Pt progress this shift.    Pt is alert and oriented. Mod I from bed <> w/c. Min assist to commode. Denied pain, SOB, CP, and n/t. Call light within reach. Pt appeared asleep during majority of the safety rounds. Will continue with POC.

## 2024-05-11 NOTE — PROGRESS NOTES
Nephrology Progress Note  05/10/2024         Assessment & Recommendations:   Shirley Hendricks is a 63 year old year old with peripheral artery disease, coronary artery disease and history of MI, hypertension, diabetes mellitus type 2 who is admitted to ARU following hospitalization for occlusion of right LE bypass graft managed with right above-the-knee amputation and complication of acute kidney injury requiring dialysis.     #Acute kidney injury: multifactorial (use of IV contrast 3 days in a row for evaluation and management of her occluded bypass graft, ischemia and possibly rhabdomyolysis) Creatinine level was 0.7 mg/dL prior to this episode. No urine studies available prior to LIMA to assess baseline proteinuria.  -First dialysis run 4/3/2024.  -Access tunneled CVC initially placed 4/3/2024 and revised on 4/15/2024.  - Pursue dialysis Tuesday, Thursday, Saturday for now but continue to watch closely for recovery  - Please obtain daily renal panel to assess signs of recovery of kidney function.    - Plan for dialysis 5/11/24 per usual schedule with UF 2L.         #Hyponatremia:   - Patient reports adhering to fluid restriction  - please continue to minimize shifts in serum Na with each dialysis   - hyponatremia partially corrected with dialysis, but will predictably recur until and if her urine output improves signficantly    # Systolic Blood pressure acceptable today. Relative bradycardia on carvedilol 6.25 mg twice daily.   On amlodipine 10 mg daily and bumetanide 4 mg on non-dialysis days         #PAD/PVD  #Right common femoral artery to the mid anterior tibial artery bypass graft acute occlusion  #Right above-the-knee amputation 4/9/2024  - Plavix 75 mg daily (previously on Xarelto but no ongoing indication)     #Anemia due to acute blood loss with retroperitoneal hematoma and history of GI bleeding in December 2023.  - retacrit 10K units three times weekly with HD  -Received iron sucrose on 4/30    "  #Coronary artery disease with history of myocardial infarction x 3  #History of Takotsubo cardiomyopathy     #Diabetes mellitus type 2     #Stage Mark marginal zone B-cell lymphoma  - Undergoing surveillance with [place] [facility]     Recommendations were communicated to primary team via note    I spent a total of 30 minutes in reviewing the medical records, face-to-face evaluation and physical exam, review of labs, counseling, orders, care coordination and documentation      Case Cohen MD   Division of Renal Disease and Hypertension  [facility]  Vocera Web Console    Interval History : May 10, 2024    Nursing and provider notes from last 24 hours reviewed.  Patient seen and examined this pm  Reports several voids (subjective increase in volume?).  Denied CP, SOB,.  No N/V      Physical Exam:   I/O last 3 completed shifts:  In: 370 [P.O.:370]  Out: 2 [Other:2]   BP (!) 155/67 (BP Location: Left arm, Patient Position: Semi-Myers's, Cuff Size: Adult Regular)   Pulse 71   Temp 98.7  F (37.1  C) (Oral)   Resp 17   Ht 1.549 m (5' 0.98\")   Wt 52.5 kg (115 lb 11.9 oz)   LMP  (LMP Unknown)   SpO2 95%   BMI 21.88 kg/m       GENERAL APPEARANCE: no distress  EYES:  no scleral icterus, pupils equal  Mouth: dry mucosa  PULM: normal work of breathing.  Lungs clear to lat auscultation  CV: RRR. No rub, gallop  1+ left foot edema  R AKA  INTEGUMENT: no cyanosis, no rash  NEURO:  speech fluent, face symmetric    Labs:   All labs reviewed by me  Electrolytes/Renal -   Recent Labs   Lab Test 05/10/24  1229 05/10/24  0607 05/09/24  0655 05/08/24  0546   NA  --  128* 126* 129*   POTASSIUM  --  4.2 4.7 4.2   CHLORIDE  --  98 96* 96*   CO2  --  27 24 24   BUN  --  10.7 17.4 11.1   CR  --  2.54* 3.37* 2.66*   GLC  --  78 75 79   SONIA  --  7.2* 7.3* 7.4*   MAG 2.4* 1.5* 1.7 1.9   PHOS  --  2.2* 2.7 2.5       CBC -   Recent Labs   Lab Test 05/09/24  0655 05/07/24  0553 05/02/24  0551   WBC 3.2* 3.3* 3.6*   HGB 9.7* 9.4* 8.7*   PLT " 191 202 223       LFTs -   Recent Labs   Lab Test 05/10/24  0607 05/09/24  0655 05/08/24  0546 05/07/24  0553 04/30/24  0653 04/23/24  0602   ALKPHOS  --   --   --  90 100 96   BILITOTAL  --   --   --  0.5 0.4 0.5   ALT  --   --   --  12 24 20   AST  --   --   --  14 21 16   PROTTOTAL  --   --   --  4.1* 4.3* 4.1*   ALBUMIN 1.8* 1.9* 1.9* 1.9* 2.0*  2.0* 1.9*       Iron Panel -   Recent Labs   Lab Test 04/10/24  0559 04/09/24  1817 01/08/24  1542 12/11/23  0550   IRON 22*  --  71 122   IRONSAT 18  --  17 32   BRYN  --  609* 25 23       Current Medications:  Current Facility-Administered Medications   Medication Dose Route Frequency Provider Last Rate Last Admin    amLODIPine (NORVASC) tablet 10 mg  10 mg Oral Daily Corrina Hurt PA-C   10 mg at 05/10/24 1057    bumetanide (BUMEX) tablet 4 mg  4 mg Oral Once per day on Sunday Monday Wednesday Friday Corrina Hurt PA-C   4 mg at 05/10/24 1108    carvedilol (COREG) tablet 6.25 mg  6.25 mg Oral BID w/meals Corrina Hurt PA-C   6.25 mg at 05/10/24 1847    cetirizine (zyrTEC) tablet 5 mg  5 mg Oral Daily Corrina Hurt PA-C   5 mg at 05/10/24 1056    clopidogrel (PLAVIX) tablet 75 mg  75 mg Oral Daily Corrina Hurt PA-C   75 mg at 05/10/24 1100    famotidine (PEPCID) tablet 20 mg  20 mg Oral Q48H Corrina Hurt PA-C   20 mg at 05/10/24 1555    fluticasone-vilanterol (BREO ELLIPTA) 200-25 MCG/ACT inhaler 1 puff  1 puff Inhalation Daily Corrina Hurt PA-C   1 puff at 05/08/24 0919    gabapentin (NEURONTIN) capsule 200 mg  200 mg Oral Once per day on Tuesday Thursday Saturday Corrina Hurt PA-C   200 mg at 05/09/24 2014    heparin ANTICOAGULANT injection 5,000 Units  5,000 Units Subcutaneous Q12H Corrina Hurt PA-C   5,000 Units at 05/10/24 1115    lactobacillus rhamnosus (GG) (CULTURELL) capsule 1 capsule  1 capsule Oral BID Corrina Hurt PA-C   1 capsule at 05/10/24 1058    levofloxacin (LEVAQUIN) tablet  500 mg  500 mg Oral Every Other Day Maya Watkins MD   500 mg at 05/10/24 1057    miconazole (MICATIN) 2 % powder   Topical BID Corrina Hurt PA-C   Given at 05/09/24 2011    multivitamin RENAL (TRIPHROCAPS) capsule 1 capsule  1 capsule Oral Daily Corrina Hurt PA-C   1 capsule at 05/08/24 1200    nicotine (NICODERM CQ) 14 MG/24HR 24 hr patch 1 patch  1 patch Transdermal Daily Corrina Hurt PA-C   1 patch at 05/09/24 0927    polyethylene glycol (MIRALAX) Packet 17 g  17 g Oral Daily Corrina Hurt PA-C   17 g at 05/10/24 1104    rosuvastatin (CRESTOR) tablet 10 mg  10 mg Oral At Bedtime Corrina Hurt PA-C   10 mg at 05/09/24 2010    senna-docusate (SENOKOT-S/PERICOLACE) 8.6-50 MG per tablet 1-2 tablet  1-2 tablet Oral BID Corrina Hurt PA-C   1 tablet at 05/10/24 1102    silver sulfADIAZINE (SILVADENE) 1 % cream   Topical Daily Gala Lo MD   Given at 05/09/24 0929     Current Facility-Administered Medications   Medication Dose Route Frequency Provider Last Rate Last Admin     Case Cohen MD

## 2024-05-11 NOTE — PROGRESS NOTES
Nephrology Progress Note  05/11/2024         Assessment & Recommendations:   Shirley Hendricks is a 63 year old year old female with peripheral artery disease, coronary artery disease and history of MI, hypertension, diabetes mellitus type 2 who is admitted to ARU following hospitalization for occlusion of right LE bypass graft managed with right above-the-knee amputation and complication of acute kidney injury requiring dialysis.     #Acute kidney injury: multifactorial (use of IV contrast 3 days in a row for evaluation and management of her occluded bypass graft, ischemia and possibly rhabdomyolysis) Creatinine level was 0.7 mg/dL prior to this episode. No urine studies available prior to LIMA to assess baseline proteinuria.  -First dialysis run 4/3/2024.  -Access tunneled CVC initially placed 4/3/2024 and revised on 4/15/2024.  - Continue daily renal panel or BMP to assess signs of recovery of kidney function (on 5/11 creatinine seth 0.6, on 5/9 in seth 0.7 mg/dl) until she is ready for discharge.  - Continue TTS dialysis schedule while at ARU [unless we see more clear evidence of renal recovery (which we could more thoroughly workup up then with a 24hr urine collection for urine creatinine mass and clearance)].     #Hyponatremia, improving with HD and fluid restriction   - Patient reports adhering to fluid restriction  - hyponatremia partially corrected with dialysis, but will predictably recur until and if her urine output improves signficantly    # hypertension, volume status   Relative bradycardia on carvedilol 6.25 mg twice daily.   On amlodipine 10 mg daily and bumetanide 4 mg on non-dialysis days - continue.    #PAD/PVD  #Right common femoral artery to the mid anterior tibial artery bypass graft acute occlusion  #Right above-the-knee amputation 4/9/2024  - Plavix 75 mg daily (previously on Xarelto but no ongoing indication)     #Anemia due to acute blood loss with retroperitoneal hematoma and history  "of GI bleeding in December 2023.  - retacrit 10K units three times weekly with HD  -Received iron sucrose on 4/30     #Coronary artery disease with history of myocardial infarction x 3  #History of Takotsubo cardiomyopathy     #Diabetes mellitus type 2     #Stage Mark marginal zone B-cell lymphoma  - Undergoing surveillance with [place] [facility]     Recommendations were communicated to primary team via this note.    Vineet Hodges MD   Division of Renal Disease and Hypertension  [facility]  Vocera Web Console        Interval History :   Nursing and provider notes reviewed.  Creatinine rise between HD runs was 0.6 mg/dl. Previous rise was 0.7 mg/dl. She had no complaints today when seen on rounds. She does note that she thinks she is urinating more and urinating \"a lot.\" She does report swelling in her left foot.   4 point ROS otherwise negative.       Physical Exam:   I/O last 3 completed shifts:  In: 360 [P.O.:360]  Out: -    /62 (BP Location: Left arm)   Pulse 65   Temp 97.6  F (36.4  C) (Oral)   Resp 20   Ht 1.549 m (5' 0.98\")   Wt 55 kg (121 lb 4.1 oz)   LMP  (LMP Unknown)   SpO2 96%   BMI 22.92 kg/m       GENERAL APPEARANCE: NAD   EYES:  no scleral icterus  Mouth: dry mucosa  PULM: normal work of breathing.  Lungs CTAB  CV: RRR. No rub, gallop  trace left foot edema  R AKA  INTEGUMENT: no cyanosis, no rash on exposed skin  NEURO:  speech fluent, face symmetric    Labs:   All labs reviewed by me  Electrolytes/Renal -   Recent Labs   Lab Test 05/11/24  0552 05/10/24  1229 05/10/24  0607 05/09/24  0655   *  --  128* 126*   POTASSIUM 4.5  --  4.2 4.7   CHLORIDE 97*  --  98 96*   CO2 25  --  27 24   BUN 16.8  --  10.7 17.4   CR 3.14*  --  2.54* 3.37*   GLC 69*  --  78 75   SONIA 7.1*  --  7.2* 7.3*   MAG 2.0 2.4* 1.5* 1.7   PHOS 2.6  --  2.2* 2.7       CBC -   Recent Labs   Lab Test 05/09/24  0655 05/07/24  0553 05/02/24  0551   WBC 3.2* 3.3* 3.6*   HGB 9.7* 9.4* 8.7*    202 223 "       LFTs -   Recent Labs   Lab Test 05/11/24  0552 05/10/24  0607 05/09/24  0655 05/08/24  0546 05/07/24  0553 04/30/24  0653 04/23/24  0602   ALKPHOS  --   --   --   --  90 100 96   BILITOTAL  --   --   --   --  0.5 0.4 0.5   ALT  --   --   --   --  12 24 20   AST  --   --   --   --  14 21 16   PROTTOTAL  --   --   --   --  4.1* 4.3* 4.1*   ALBUMIN 2.0* 1.8* 1.9*   < > 1.9* 2.0*  2.0* 1.9*    < > = values in this interval not displayed.       Iron Panel -   Recent Labs   Lab Test 04/10/24  0559 04/09/24  1817 01/08/24  1542 12/11/23  0550   IRON 22*  --  71 122   IRONSAT 18  --  17 32   BRYN  --  609* 25 23       Current Medications:  Current Facility-Administered Medications   Medication Dose Route Frequency Provider Last Rate Last Admin    amLODIPine (NORVASC) tablet 10 mg  10 mg Oral Daily Corrina Hurt PA-C   10 mg at 05/11/24 0957    bumetanide (BUMEX) tablet 4 mg  4 mg Oral Once per day on Sunday Monday Wednesday Friday Corrina Hurt PA-C   4 mg at 05/10/24 1108    carvedilol (COREG) tablet 6.25 mg  6.25 mg Oral BID w/meals Corrina Hurt PA-C   6.25 mg at 05/11/24 0958    cetirizine (zyrTEC) tablet 5 mg  5 mg Oral Daily Corrina Hurt PA-C   5 mg at 05/11/24 0958    clopidogrel (PLAVIX) tablet 75 mg  75 mg Oral Daily Corrina Hurt PA-C   75 mg at 05/11/24 0957    epoetin mireya-epbx (RETACRIT) injection 4,000 Units  4,000 Units Intravenous Once in dialysis/CRRT Case oChen MD        famotidine (PEPCID) tablet 20 mg  20 mg Oral Q48H Corrina Hurt PA-C   20 mg at 05/10/24 1555    fluticasone-vilanterol (BREO ELLIPTA) 200-25 MCG/ACT inhaler 1 puff  1 puff Inhalation Daily Corrina Hurt PA-C   1 puff at 05/11/24 1010    gabapentin (NEURONTIN) capsule 200 mg  200 mg Oral Once per day on Tuesday Thursday Saturday Corrina Hurt PA-C   200 mg at 05/09/24 2014    heparin ANTICOAGULANT injection 5,000 Units  5,000 Units Subcutaneous Q12H Corrina Hurt,  COLE   5,000 Units at 05/11/24 1011    lactobacillus rhamnosus (GG) (CULTURELL) capsule 1 capsule  1 capsule Oral BID Corrina Hurt PA-C   1 capsule at 05/11/24 1010    levofloxacin (LEVAQUIN) tablet 500 mg  500 mg Oral Every Other Day Maya Watkins MD   500 mg at 05/10/24 1057    miconazole (MICATIN) 2 % powder   Topical BID Corrina Hurt PA-C   Given at 05/11/24 1001    multivitamin RENAL (TRIPHROCAPS) capsule 1 capsule  1 capsule Oral Daily Corrina Hurt PA-C   1 capsule at 05/11/24 1301    nicotine (NICODERM CQ) 14 MG/24HR 24 hr patch 1 patch  1 patch Transdermal Daily Corrina Hurt PA-C   1 patch at 05/11/24 1002    No heparin via hemodialysis machine   Does not apply Once Case Cohen MD        polyethylene glycol (MIRALAX) Packet 17 g  17 g Oral Daily Corrina Hurt PA-C   17 g at 05/10/24 1104    rosuvastatin (CRESTOR) tablet 10 mg  10 mg Oral At Bedtime Corrina Hurt PA-C   10 mg at 05/10/24 2053    senna-docusate (SENOKOT-S/PERICOLACE) 8.6-50 MG per tablet 1-2 tablet  1-2 tablet Oral BID Corrina Hurt PA-C   1 tablet at 05/11/24 1010    silver sulfADIAZINE (SILVADENE) 1 % cream   Topical Daily Gala Lo MD   Given at 05/11/24 1019    sodium chloride 0.9% BOLUS 250 mL  250 mL Intravenous Once in dialysis/CRRT Case Cohen MD        sodium chloride 0.9% BOLUS 300 mL  300 mL Hemodialysis Machine Once Case Cohen MD         Current Facility-Administered Medications   Medication Dose Route Frequency Provider Last Rate Last Admin     Vineet Hodges MD

## 2024-05-11 NOTE — PLAN OF CARE
Discharge Planner Post-Acute Rehab OT:      Discharge Plan: home with assist and HH OT      Precautions: fall, NWB of RLE, dialysis, sacral wound, elevate residual limb when in bed     Current Status:  ADLs:  Mobility: mod I for slide board to WC from bed  Grooming: IND seated at sink in w/c  Dressing: UB: IND, LB: IND in supine, IND for brace and shoe  Bathing: TBD  Toileting: SB to commode Ax1, assit for lydia cares and clothing depending on continence  IADLs: Pt does most IADL including caregiving for .   Vision/Cognition: intact     Assessment: pt refused most treatment options due to pt feeling discouraged w/ life situation and dialysis running late, agreed to discussion of POC . See doc flow for details.  Other Barriers to Discharge (DME, Family Training, etc): DME, family training, pt has support from multiple family members and will need to schedule family training since she will most likely still require assist with toileting.     5/4 Pt states her H is staying with his sister and she hopes this will con't.  She reports her brother will help her but she would like to be IND with toileting as he is not likely to assist her with toileting.

## 2024-05-11 NOTE — PLAN OF CARE
Discharge Plan: home with HH PT vs TCU     Precautions: fall, NWB of RLE, sacral wound, elevate RLE in bed    Current Status:  Bed Mobility: [place]aira   Transfer: [place] slideboard bed<>w/c, Siddharth for commode.   Gait: unable, unsafe  Stairs: not tested  Balance: Stable dynamic sitting.     Assessment: Practice w/ couch transfer, SBA w/ slideboard but increased effort d/t unevenness of transfer.     Other Barriers to Discharge (DME, Family Training, etc):   DME: K3 w/c (delivery scheduled for 5/10), slideboard and ramp (emailed handouts to sister)    Family training: Completed w/ brother on 5/10.

## 2024-05-11 NOTE — PLAN OF CARE
Goal Outcome Evaluation:  VS: VSS   O2: 92% AT RA   Output: SEE I AND O FLOW SHEET   Last BM: 5/11/24   Activity: MOD IND BETWEEN CHAIR AND BED WITH SB. ASSIST OF 1 TO THE COMMODE WITH SB   Skin: R AKA, REDNESS IN GROIN, MEPILEX DRESSING ON COCCYX   Pain: DENIES PAIN   CMS: INTACT   Dressing: R AKA/ DRESSING CHANGED TODAY    Diet: REGULAR/ THIN   LDA: CVC LINE, RIGHT CHEST   Equipment: WHEEL CHAIR, SLIDING BOARD   Plan: WE WILL CONTINUE WITH CURRENT PLAN OF CARE   Additional Info:

## 2024-05-11 NOTE — PROGRESS NOTES
[facility]   Acute Rehabilitation Unit  Daily progress note    INTERVAL HISTORY  Shirley Hendricks was seen and examined at bedside this morning. Planning TCU in order to facilitate transitioning to her home given that she does not have any current supports.  Rough night +. HD today.     Denies being light headed or dizzy.  On 3 gram Na. Na 130.  Functional Update:     PT:  Current Status:  Bed Mobility: Humaira   Transfer: Humaira slideboard bed<>w/c, Siddharth for commode.   Gait: unable, unsafe  Stairs: not tested  Balance: Stable dynamic sitting.      Assessment: Family training completed w/ brother Alan and lucille. Per discussion w/ pt and brother they seem to have safe plan in place to assist pt at home. Pt will be home alone for very few hours at a time during the week.     OT:  Current Status:  ADLs:  Mobility: mod I for slide board to WC from bed  Grooming: IND seated at sink in w/c  Dressing: UB: IND, LB: IND in supine, IND for brace and shoe  Bathing: TBD  Toileting: SB to commode Ax1, assit for lydia cares and clothing depending on continence  IADLs: Pt does most IADL including caregiving for .   Vision/Cognition: intact     Assessment: Family training completed with brother. Pt's brother is able to assist as needed and therapist placed an emphasis on incontinence cares. Per discussion, the pt will not be home alone too much between her brother and son's different schedules. Pt does not have her commode yet but it has been ordered. TCU referrals have been placed as well.       MEDICATIONS  Current Facility-Administered Medications   Medication Dose Route Frequency Provider Last Rate Last Admin    amLODIPine (NORVASC) tablet 10 mg  10 mg Oral Daily Corrina Hurt PA-C   10 mg at 05/11/24 0957    bumetanide (BUMEX) tablet 4 mg  4 mg Oral Once per day on Sunday Monday Wednesday Friday Corrina Hurt PA-C   4 mg at 05/10/24 1108    carvedilol (COREG)  tablet 6.25 mg  6.25 mg Oral BID w/meals Corrina Hurt PA-C   6.25 mg at 05/11/24 0958    cetirizine (zyrTEC) tablet 5 mg  5 mg Oral Daily Corrina Hurt PA-C   5 mg at 05/11/24 0958    clopidogrel (PLAVIX) tablet 75 mg  75 mg Oral Daily Corrina Hurt PA-C   75 mg at 05/11/24 0957    famotidine (PEPCID) tablet 20 mg  20 mg Oral Q48H Corrina Hurt PA-C   20 mg at 05/10/24 1555    fluticasone-vilanterol (BREO ELLIPTA) 200-25 MCG/ACT inhaler 1 puff  1 puff Inhalation Daily Corrina Hurt PA-C   1 puff at 05/11/24 1010    gabapentin (NEURONTIN) capsule 200 mg  200 mg Oral Once per day on Tuesday Thursday Saturday Corrina Hurt PA-C   200 mg at 05/09/24 2014    heparin ANTICOAGULANT injection 5,000 Units  5,000 Units Subcutaneous Q12H Corrina Hurt PA-C   5,000 Units at 05/11/24 1011    lactobacillus rhamnosus (GG) (CULTURELL) capsule 1 capsule  1 capsule Oral BID Corrina Hurt PA-C   1 capsule at 05/11/24 1010    levofloxacin (LEVAQUIN) tablet 500 mg  500 mg Oral Every Other Day Maya Watkins MD   500 mg at 05/10/24 1057    miconazole (MICATIN) 2 % powder   Topical BID Corrina Hurt PA-C   Given at 05/11/24 1001    multivitamin RENAL (TRIPHROCAPS) capsule 1 capsule  1 capsule Oral Daily Corrina Hurt PA-C   1 capsule at 05/08/24 1200    nicotine (NICODERM CQ) 14 MG/24HR 24 hr patch 1 patch  1 patch Transdermal Daily Corrina Hurt PA-C   1 patch at 05/11/24 1002    polyethylene glycol (MIRALAX) Packet 17 g  17 g Oral Daily Corrina Hurt PA-C   17 g at 05/10/24 1104    rosuvastatin (CRESTOR) tablet 10 mg  10 mg Oral At Bedtime Corrina Hurt PA-C   10 mg at 05/10/24 2053    senna-docusate (SENOKOT-S/PERICOLACE) 8.6-50 MG per tablet 1-2 tablet  1-2 tablet Oral BID Corrina Hurt PA-C   1 tablet at 05/11/24 1010    silver sulfADIAZINE (SILVADENE) 1 % cream   Topical Daily Gala Lo MD   Given at 05/11/24 1019     "      Current Facility-Administered Medications   Medication Dose Route Frequency Provider Last Rate Last Admin    acetaminophen (TYLENOL) tablet 500-1,000 mg  500-1,000 mg Oral Q6H PRN Corrina Hurt PA-C   1,000 mg at 05/10/24 2107    [Held by provider] diphenoxylate-atropine (LOMOTIL) 2.5-0.025 MG per tablet 1 tablet  1 tablet Oral 4x Daily PRN Corrina Hurt PA-C   1 tablet at 04/30/24 1538    HYDROmorphone (DILAUDID) tablet 2 mg  2 mg Oral Q6H PRN Corrina Hurt PA-C   2 mg at 05/07/24 2134    naloxone (NARCAN) injection 0.2 mg  0.2 mg Intravenous Q2 Min PRN Arun Pimentel MD        Or    naloxone (NARCAN) injection 0.4 mg  0.4 mg Intravenous Q2 Min PRN Arun Pimentel MD        Or    naloxone (NARCAN) injection 0.2 mg  0.2 mg Intramuscular Q2 Min PRN Arun Pimentel MD        Or    naloxone (NARCAN) injection 0.4 mg  0.4 mg Intramuscular Q2 Min PRN Arun Pimentel MD        nitroGLYcerin (NITROSTAT) sublingual tablet 0.4 mg  0.4 mg Sublingual Q5 Min PRN Corrina Hurt PA-C        ondansetron (ZOFRAN ODT) ODT tab 4 mg  4 mg Oral Q6H PRN Corrina Hurt PA-C   4 mg at 05/09/24 0936    polyethylene glycol (MIRALAX) powder 17 g  17 g Oral Daily PRN Corrina Hurt PA-C            PHYSICAL EXAM  BP (!) 156/62 (BP Location: Left arm)   Pulse 65   Temp 97.9  F (36.6  C) (Oral)   Resp 17   Ht 1.549 m (5' 0.98\")   Wt 55 kg (121 lb 4.1 oz)   LMP  (LMP Unknown)   SpO2 92%   BMI 22.92 kg/m    Gen: NAD, laying comfortably in bed  HEENT: NC/AT, MMM, EOMI, PERRLA  Cardio: RRR, no M/R  Pulm: CTA bilaterally non-labored on room air  Abd: soft, non-tender, non-distended  Ext: some edema in RLE; charcot foot deformity on left; R AKA   Neuro/MSK: Awake, alert, moving all extremities actively in bed      LABS  CBC RESULTS:   Recent Labs   Lab Test 05/09/24  0655 05/07/24  0553 05/02/24  0551   WBC 3.2* 3.3* 3.6*   RBC 3.20* 3.13* 2.80*   HGB 9.7* 9.4* 8.7*   HCT 30.8* 29.4* 26.9*   MCV 96 94 96   MCH " 30.3 30.0 31.1   MCHC 31.5 32.0 32.3   RDW 17.6* 17.5* 18.4*    202 223       Last Basic Metabolic Panel:  Recent Labs   Lab Test 05/11/24  0552 05/10/24  0607 05/09/24  0655   * 128* 126*   POTASSIUM 4.5 4.2 4.7   CHLORIDE 97* 98 96*   CO2 25 27 24   ANIONGAP 8 3* 6*   GLC 69* 78 75   BUN 16.8 10.7 17.4   CR 3.14* 2.54* 3.37*   GFRESTIMATED 16* 20* 14*   SONIA 7.1* 7.2* 7.3*         Rehabilitation - continue comprehensive acute inpatient rehabilitation program with multidisciplinary approach including therapies, rehab nursing, and physiatry following. See interval history for updates.      ASSESSMENT AND PLAN  Shirley Hendricks is a 63 year old right hand dominant female with complicated past medical history including but not limited to PAD with multiple prior bilateral lower extremity vascular bypass grafts and thrombectomies (most recently 10/2023 right common femoral to proximal anterior tibial PTFE bypass graft), bilateral Charcot-Breonna-Tooth foot deformity, prior bilateral carotid endarterectomies, B-cell lymphoma, CAD (hx Mix3), hypertension, hyperlipidemia, GI bleed (12/2023), type 2 diabetes mellitus, COPD, tobacco use disorder, iron deficiency anemia, GERD, Celiac disease, Takotsubo cardiomyopathy, SVT, depression/anxiety and spongiotic dermatitis who was admitted on 3/29/24 with acute occlusion of right lower extremity arterial bypass graft now s/p right above-knee amputation 4/1/24, completed 4/9/24 with hospital course complicated by acute renal failure now on dialysis, sepsis, acute blood loss anemia, acute post-operative pain, nausea, loose stools, delirium, malnutrition, and multiple electrolyte derangements.  She is now admitted to ARU on 4/22/24 for multidisciplinary rehabilitation and ongoing medical management.        Admission to acute inpatient rehab 04/22/24.    Impairment group code: Amputation 05.3 Unilateral LE AKA; emergent R AKA due to acute occlusion of RLE bypass graft          PT and OT 90 minutes of each daily for 6 days per week in addition to rehab nursing and close management of physiatrist.       Impairment of ADL's: Noted to have impaired activity tolerance, impaired balance, impaired strength, impaired weight shifting, and pain, all affecting her ability to safely and independently perform basic ADLs.  Goal for mod I with basic wheelchair-based ADLs with assist for bathing, as well as assist IADLs/heavier activities.     Impairment of mobility:  Noted to have impaired activity tolerance, impaired balance, impaired strength, impaired weight shifting, and pain, all affecting her ability to safely and independently perform basic mobility.  Goal for mod I with basic wheelchair-based mobility.     Impairment of cognition/language/swallow:  Noted to have dysphagia with goals for safe tolerance of least restrictive diet.  However, IP SLP noting did not anticipate further SLP services at ARU, no issues with tolerating regular diet.  Will not consult initially.  If any concern for difficulty tolerating current diet, can consult.     Medical Conditions  New actions/orders/updates for today are in blue.     S/p right AKA 4/1/24, completed 4/9/24 due to critical limb ischemia, acute occlusion of right common femoral artery to mid anterior tibial artery bypass graft   PAD/PVD with hx multiple prior vascular interventions to BLE  Bilateral Charcot-Breonna-Tooth foot deformities  Acute post-op pain  - Wound care: daily dressing changes to right AKA with xeroform and gauze with kerlix to keep wound protected  - Dehiscence of lateral surgical incision with some increased serosanguinous drainage.  Also with multiple areas with necrotic appearance.  Overall similar to day of ARU admission although with more drainage.  Vascular surgery consulted/assessed on 4/28.  Appreciate assist.  Recommended ongoing daily dressing changes with Xeroform, gauze fluffs, loosely wrapped Kerlix.  DO NOT use ace  wrap.  Stockinette can be placed into over-the-shoulder sling to prevent right AKA dressing from falling off during therapies.  Silvadene topical also added per vascular surgery on 4/29, to be applied daily to incision.  Some increased erythema noted at surgical site on 5/2.  Vascular surgery reassessed 5/3 and felt overall appearance not concerning.  Will likely eventually need debridement of anterior thigh but that will be deferred/evaluated at OP follow-up with Dr. Hernandez.  No indication for antibiotics at this time.  Continue current plan of care.  - Also with significant itching at surgical site.  Recently seen by derm for dermatitis as below and recommended to trial zyrtec or claritin for itching.  Started zyrtec 5 mg daily on 4/25.  - NWB RLE  - Continue PTA L foot custom orthotic   - Continue Plavix 75 mg daily (given hx left bypass).  No ongoing need for Xarelto per vascular (no recent DVT or PE)  - Pain management: APAP 500-1000 mg q6h PRN, dilaudid 2 mg q6h PRN, gabapentin 200 mg 3x/week after HD (renally dosed).  Wean opioids as able.   - Continue PT/OT  - Follow up with vascular surgery in 2-4 weeks (~5/28)     Acute blood loss anemia on anemia of chronic disease, hx iron deficiency  Retroperitoneal hematoma  Recent GI bleed (hospitalized 12/2023)  Required intermittent transfusion during this admission (3/30, 3/31, 4/2, 4/6, 4/9, 4/13).  Repeat CT abdomen on 4/20 with stable right retroperitoneal hematoma; no s/o active bleed.  5/9: Hgb stable at 9.7  - Repeat CT abd/pelvis on 5/2 demonstrated decreased small right retroperitoneal hematoma (iatrogenic)  - Continue epo/venofer with HD per nephrology  - Trend CBC every T/Th/Sat  - Transfuse for Hgb <7     Acute renal failure on HD  Hyponatremia  Normal baseline Cr, though per nephrology, suspect some modest CKD.  LIMA this admission, up to peak Cr 4/22 on 4/3.  Likely BAILEY +/- rhabdo +/- ischemic injury with no signs of recovery and now dialysis dependent  (started 4/3/24).  S/p tunneled dialysis catheter placement 4/3/24.  Labs as above.   - Nephrology consulted, appreciate ongoing assistance  - HD T/Th/Sat at ARU or per nephrology recs.  Plan [facility] OP [facility]Th[facility]Sat  - S/p diuretic challenge (Lasix 100 mg PO) on 4/29 per nephrology without improved output.  Evidence of fluid overload on CT abd/pelv 5/2 with mod pleural effusions, small volume ascites, diffuse soft tissue anasarca.  Per nephrology, recommended Bumex 4 mg daily on non-dialysis days, started on 5/6.  - Daily renal panel for now per nephrology  - Continue 1L fluid restriction  - Avoid NSAIDs/nephrotoxins, renally dose medications     Bilateral pleural effusion  Noted on CT abdomen 4/20 with moderate b/l pleural effusion (left>right).  Has been intermittently requiring 1-2L supplemental oxygen.  Unclear if related to volume given new renal failure on HD, also baseline COPD.  Repeat CT abd/pelv on 5/2 with moderate pleural effusions  - Monitor respiratory status, supplemental O2 by NC PRN to maintain sats >88%  - Consider thoracentesis if symptomatic or worsening hypoxia      Celiac disease  Colon distension   Loose stools, improving  Nausea/vomiting, improving  Severe malnutrition in context of acute on chronic illness  Pill cam study with [facility] in 3/2024 (due to recent GI bleed), which revealed mild non-erosive red tissue in the duodenum, some atrophic type appearance that could be celiac disease.  This was followed by celiac labs on 3/20/24 (Gliadin ab and tTG IgA antibody) which were positive and thus Celiac diagnosis was established and she was recommended for gluten-free diet.  This admission, noted to have colonic distension and circumferential wall thickening of the distal colon and rectum concerning for proctocolitis on CT abdomen.  Also with loose stools, nausea.  Despite this, patient declined gluten-free diet (switched on 4/18).  Noted to have improvement in loose stools and  "nausea at discharge to ARU per sending team.  However, has persisted with loose stools, intermittent nausea and vomiting.  Repeat cdiff on 4/28 negative.  Suspect symptoms are due to Celiac disease and non-compliance with gluten-free diet.  Patient agreeable to trial of gluten-free diet started on 4/28.  Due to recurrent nausea/emesis, ongoing loose stools, + new rebound/guarding in right abdomen, CT abdomen/pelvis obtained on 5/2 which showed \"persistent wall thickening of the descending and sigmoid colon in addition to the rectum, similar to prior study. This may also be due  to overall third spacing of fluid versus colitis given diarrhea history. Large volume of stool.\"  Loose stools could represent obstructive diarrhea.  Lomotil held, scopolamine stopped.  Started on bowel protocol.  Improvement in abdominal pain, nausea, vomiting.  Ongoing loose stools but improving in consistency per patient, frequency varies and continues to cause incontinence.  AXR with nonobstructive bowel gas pattern, moderate colonic stool burden, overall not significantly changed.  S/p enema on 5/6 with several subsequent loose Bms.  Daily Miralax added 5/7.  - Continue gluten-free diet  - RD consulted, appreciate assistance  - AXR  shows improvement on stool burden.  Will continue with current plan and will monitor.  - Continue senokot-S 1-2 tabs BID scheduled  - Continue Miralax daily  - PRN Zofran  - Continue probiotics BID  - Monitor symptoms  - Follow up with [facility] as outpatient     Abnormal UA  Given ongoing nausea/vomiting and abdominal/pelvic pain, as well as bladder wall thickening on CT, UA obtained to rule out UTI.   Results with large proteinuria, negative nitrites, small hematuria, large leuk esterase, many bacteria.  UC growing >100k colonies candida and 10-50k colonies of E. Coli.  Started on levofloxacin.  - Continue levofloxacin 500 mg every other day (thru 5/12)  - No clear urinary symptoms.     Hypomagnesemia  Replaced " mag IV intermittently.  5/10 mag increased to 2.4 from previous 1.7, 2.0 5/11/2024   - Continue to trend     Hypocalcemia  5/7: Ca corrects to 8.6 (mildly low) for hypoalbuminemia.  Have reached out to nephrology to ask about resuming supplement but without response.  - Per pharmacy, was on PTA calcium carbonate/vit D PTA (had run out 1 week before admission), not ordered while at hospital.   - Continue to trend     CAD (hx MI x3)  HTN  Hyperlipidemia  Hx Takotsubo CM  Hx SVT  Last coronary angiogram completed 10/2023.  Recovered EF (55-60%) from ECHO 11/2023.  - Continue PTA Coreg 6.25 mg BID  - Continue amlodipine 10 mg daily (increased 5/8 per nephrology)  - Continue Bumex 4 mg on non-dialysis days (added 5/6 per nephrology)  - Hold PTA lisinopril 10 mg daily due to renal failure  - PTA Jardiance 10 mg daily discontinued due to renal failure  - Rosuvastatin 10 mg daily resumed on ARU admission (previously held due to concern for rhabdo).  5/7: repeat hepatic panel WNL (with exception of low total protein)    - Monitor BP  - Was to follow up with cardiology in Feb 2024, should be scheduled after discharge     Murmur  Noted on exam this admission by attending physiatrist.  Per chart review, intermittently documented in the past with questionable/subtle murmur (though not by cardiology).  Most recent echo 11/7/23 with trace mitral regurg, trace tricuspid regurg, and moderate trileaflet aortic sclerosis.  - Folllow up with cardiology as above     Hx bilateral carotid artery stenosis s/p bilateral carotid endarterectomies  - Follow up with vascular surgery for annual carotid duplex (last done on 1/4/24 with stable stenosis of right carotid bulb)     Unclear hx Diabetes mellitus, type II  Listed as diagnosis per chart review.  However, family reports that patient has never been diagnosed with diabetes and no A1c that is available in her chart reflects diabetic range.  A1c this admission 5.2%.  PTA Jardiance and  gabapentin discontinued due to acute renal failure.  BG well-controlled during this admission without need for insulin.  - Monitor BG periodically with labs     B-cell lymphoma, stage VALENTIN  Followed by [facility] (Dr. Barrow).  Mild radiographic progression on CT 1/26/24.  Given asymptomatic, plans at that time for ongoing CT monitoring.  - Monitor for development of any B symptoms  - Trend CBC  - Follow up with oncology, repeat CT as planned in 7/2024     COPD  Tobacco use disorder  PTA smoking ~5 cigarettes per day  - Monitor respiratory status, supplemental O2 by NC PRN to maintain sats >88%  - Continue PTA Breo Ellipta  - Nicotine patch 14 mcg/day  - Ongoing education/resources for cessation     Adjustment disorder with mixed mood  Hx MDD/anxiety (per chart though patient denies)  Delirium, resolved  Not on medications PTA.  Patient denies any hx depression/anxiety but does note depressed mood in setting of recent AKA and significant home stressors.  - Psychology and spiritual services consulted, appreciate assist  - Monitor mood     GERD  PTA on omeprazole 20 mg daily  - Continue pepcid 20 mg q48 hours (renally dosed) given allergy to pantoprazole (formulary sub for omeprazole)     Spongiotic dermatitis  Recently seen by OP dermatology, recommended betamethasone cream BID PRN, not using regularly at hospital  - Consider resumption of topical steroids if recurrent symptoms  - Started Zyrtec 4/25 as above for itching near surgical site     Sacral wound: pressure injury (stage 2 vs 3), incontinence-associated dermatitis, moisture-associated skin damage  Assessed by [facility] on 4/22 at [facility].  - WOCN consulted for ongoing follow-up at ARU  - Wound care per WOCN orders  - Pressure reduction strategies     Endometrial thickening  Noted incidentally on CT abdomen/pelvis.  Patient denies postmenopausal bleeding.  - PCP to follow-up, consider outpatient pelvic ultrasound to better assess     Adjustment to  disability:  Clinical psychology to eval and treat if indicated  FEN: gluten free diet, 1000 mL fluid restriction  Bowel: incontinent, recent loose stools as above  Bladder: incontinent  DVT Prophylaxis: subcutaneous heparin  GI Prophylaxis: pepcid  Code: full, confirmed on admission  Disposition: Awaiting TCU acceptance  ELOS: pending discharge location  Follow up Appointments on Discharge: PCP in 1-2 weeks, vascular surgery in 2 weeks, [facility], nephrology (ongoing HD), heme/onc ([facility], 7/2024)    Doing well. Discussed with team. Continue cares and plans outlined.    Husam Coulter MD

## 2024-05-12 ENCOUNTER — APPOINTMENT (OUTPATIENT)
Dept: PHYSICAL THERAPY | Facility: CLINIC | Age: 64
DRG: 559 | End: 2024-05-12
Attending: PHYSICAL MEDICINE & REHABILITATION
Payer: COMMERCIAL

## 2024-05-12 ENCOUNTER — APPOINTMENT (OUTPATIENT)
Dept: OCCUPATIONAL THERAPY | Facility: CLINIC | Age: 64
DRG: 559 | End: 2024-05-12
Attending: PHYSICAL MEDICINE & REHABILITATION
Payer: COMMERCIAL

## 2024-05-12 LAB
ALBUMIN SERPL BCG-MCNC: 1.8 G/DL (ref 3.5–5.2)
ANION GAP SERPL CALCULATED.3IONS-SCNC: 8 MMOL/L (ref 7–15)
BUN SERPL-MCNC: 8.2 MG/DL (ref 8–23)
CALCIUM SERPL-MCNC: 7.5 MG/DL (ref 8.8–10.2)
CHLORIDE SERPL-SCNC: 99 MMOL/L (ref 98–107)
CREAT SERPL-MCNC: 2.07 MG/DL (ref 0.51–0.95)
DEPRECATED HCO3 PLAS-SCNC: 25 MMOL/L (ref 22–29)
EGFRCR SERPLBLD CKD-EPI 2021: 26 ML/MIN/1.73M2
GLUCOSE SERPL-MCNC: 75 MG/DL (ref 70–99)
HOLD SPECIMEN: NORMAL
MAGNESIUM SERPL-MCNC: 1.5 MG/DL (ref 1.7–2.3)
MAGNESIUM SERPL-MCNC: 2.1 MG/DL (ref 1.7–2.3)
PHOSPHATE SERPL-MCNC: 2.2 MG/DL (ref 2.5–4.5)
POTASSIUM SERPL-SCNC: 3.8 MMOL/L (ref 3.4–5.3)
SODIUM SERPL-SCNC: 132 MMOL/L (ref 135–145)

## 2024-05-12 PROCEDURE — 97535 SELF CARE MNGMENT TRAINING: CPT | Mod: GO

## 2024-05-12 PROCEDURE — 36415 COLL VENOUS BLD VENIPUNCTURE: CPT | Performed by: PHYSICAL MEDICINE & REHABILITATION

## 2024-05-12 PROCEDURE — 99231 SBSQ HOSP IP/OBS SF/LOW 25: CPT | Performed by: PHYSICAL MEDICINE & REHABILITATION

## 2024-05-12 PROCEDURE — 97530 THERAPEUTIC ACTIVITIES: CPT | Mod: GP | Performed by: PHYSICAL THERAPIST

## 2024-05-12 PROCEDURE — 250N000011 HC RX IP 250 OP 636: Performed by: PHYSICAL MEDICINE & REHABILITATION

## 2024-05-12 PROCEDURE — 97110 THERAPEUTIC EXERCISES: CPT | Mod: GP

## 2024-05-12 PROCEDURE — 250N000011 HC RX IP 250 OP 636: Performed by: PHYSICIAN ASSISTANT

## 2024-05-12 PROCEDURE — 97110 THERAPEUTIC EXERCISES: CPT | Mod: GP | Performed by: PHYSICAL THERAPIST

## 2024-05-12 PROCEDURE — 83735 ASSAY OF MAGNESIUM: CPT | Performed by: PHYSICAL MEDICINE & REHABILITATION

## 2024-05-12 PROCEDURE — 250N000013 HC RX MED GY IP 250 OP 250 PS 637: Performed by: PHYSICAL MEDICINE & REHABILITATION

## 2024-05-12 PROCEDURE — 80069 RENAL FUNCTION PANEL: CPT | Performed by: PHYSICIAN ASSISTANT

## 2024-05-12 PROCEDURE — 128N000003 HC R&B REHAB

## 2024-05-12 PROCEDURE — 250N000013 HC RX MED GY IP 250 OP 250 PS 637: Performed by: PHYSICIAN ASSISTANT

## 2024-05-12 PROCEDURE — 97530 THERAPEUTIC ACTIVITIES: CPT | Mod: GP

## 2024-05-12 RX ORDER — LACTOBACILLUS RHAMNOSUS GG 10B CELL
1 CAPSULE ORAL 2 TIMES DAILY
Status: DISCONTINUED | OUTPATIENT
Start: 2024-05-12 | End: 2024-05-15 | Stop reason: HOSPADM

## 2024-05-12 RX ORDER — MAGNESIUM SULFATE HEPTAHYDRATE 40 MG/ML
2 INJECTION, SOLUTION INTRAVENOUS ONCE
Qty: 50 ML | Refills: 0 | Status: COMPLETED | OUTPATIENT
Start: 2024-05-12 | End: 2024-05-12

## 2024-05-12 RX ADMIN — Medication 1 CAPSULE: at 09:24

## 2024-05-12 RX ADMIN — NICOTINE 1 PATCH: 14 PATCH, EXTENDED RELEASE TRANSDERMAL at 09:31

## 2024-05-12 RX ADMIN — FLUTICASONE FUROATE AND VILANTEROL TRIFENATATE 1 PUFF: 200; 25 POWDER RESPIRATORY (INHALATION) at 09:23

## 2024-05-12 RX ADMIN — Medication 1 CAPSULE: at 12:27

## 2024-05-12 RX ADMIN — HEPARIN SODIUM 5000 UNITS: 5000 INJECTION, SOLUTION INTRAVENOUS; SUBCUTANEOUS at 20:40

## 2024-05-12 RX ADMIN — CARVEDILOL 6.25 MG: 6.25 TABLET, FILM COATED ORAL at 18:10

## 2024-05-12 RX ADMIN — LEVOFLOXACIN 500 MG: 500 TABLET, FILM COATED ORAL at 12:54

## 2024-05-12 RX ADMIN — MAGNESIUM SULFATE HEPTAHYDRATE 2 G: 40 INJECTION, SOLUTION INTRAVENOUS at 09:45

## 2024-05-12 RX ADMIN — AMLODIPINE BESYLATE 10 MG: 10 TABLET ORAL at 09:25

## 2024-05-12 RX ADMIN — FAMOTIDINE 20 MG: 20 TABLET ORAL at 16:38

## 2024-05-12 RX ADMIN — ONDANSETRON 4 MG: 4 TABLET, ORALLY DISINTEGRATING ORAL at 20:51

## 2024-05-12 RX ADMIN — MICONAZOLE NITRATE: 20 POWDER TOPICAL at 20:41

## 2024-05-12 RX ADMIN — Medication 1 CAPSULE: at 12:54

## 2024-05-12 RX ADMIN — SILVER SULFADIAZINE: 10 CREAM TOPICAL at 18:17

## 2024-05-12 RX ADMIN — CLOPIDOGREL BISULFATE 75 MG: 75 TABLET ORAL at 09:25

## 2024-05-12 RX ADMIN — CETIRIZINE HYDROCHLORIDE 5 MG: 5 TABLET ORAL at 09:24

## 2024-05-12 RX ADMIN — Medication 1 CAPSULE: at 12:55

## 2024-05-12 RX ADMIN — ACETAMINOPHEN 1000 MG: 500 TABLET ORAL at 18:17

## 2024-05-12 RX ADMIN — BUMETANIDE 4 MG: 1 TABLET ORAL at 09:44

## 2024-05-12 RX ADMIN — HEPARIN SODIUM 5000 UNITS: 5000 INJECTION, SOLUTION INTRAVENOUS; SUBCUTANEOUS at 09:26

## 2024-05-12 RX ADMIN — Medication 1 CAPSULE: at 20:40

## 2024-05-12 RX ADMIN — ROSUVASTATIN CALCIUM 10 MG: 5 TABLET, COATED ORAL at 20:40

## 2024-05-12 RX ADMIN — CARVEDILOL 6.25 MG: 6.25 TABLET, FILM COATED ORAL at 09:25

## 2024-05-12 ASSESSMENT — ACTIVITIES OF DAILY LIVING (ADL)
ADLS_ACUITY_SCORE: 39
ADLS_ACUITY_SCORE: 36
ADLS_ACUITY_SCORE: 39
ADLS_ACUITY_SCORE: 39

## 2024-05-12 NOTE — PLAN OF CARE
Discharge Plan: home with HH PT vs TCU     Precautions: fall, NWB of RLE, sacral wound, elevate RLE in bed    Current Status:  Bed Mobility: Humaira   Transfer: Humaira slideboard bed<>w/c, Siddharth for commode.   Gait: unable, unsafe  Stairs: not tested  Balance: Stable dynamic sitting.     Assessment: Pt noted fatigue by end of afternoon session due to late arrival from dialysis.  Pt has increased difficulty with uphill SB transfers, but improved with visual cueing on positioning and sequencing.    Other Barriers to Discharge (DME, Family Training, etc):   DME: K3 w/c (delivery scheduled for 5/10), slideboard and ramp (emailed handouts to sister)    Family training: Completed w/ brother on 5/10.

## 2024-05-12 NOTE — PROGRESS NOTES
Date: 5/11/2024  Time: 2030     Data:  Pre Wt:   55 kg  Desired Wt:  - 2 kg   Post Wt:  53 kg   Weight change: - 2 kg  Ultrafiltration - Post Run Net Total Removed (mL):  2000 ml  Vascular Access Status: CVC patent  Dialyzer Rinse:  Light  Total Blood Volume Processed: 72.0 L   Total Dialysis (Treatment) Time:   3.5 Hrs  Dialysate Bath: K 2, Ca 3  ,   Heparin: no    Lab:   No    Interventions:  Dialysis done through rt tunneled CVC  Epoetin 4000sunits administered per MAR.   BG-122 mg/dl  Treatment has ended safely and  blood is rinsed back completely.  Catheter lumens flushed with saline and locked with saline, catheter caps (ClearGuard ) changed post HD.  Report given to PCN and sent back to [id number] with stable condition     Assessment:  A & O x 4, calm & cooperative, denies pain  CVC dressing changed aseptically. CVC intact, clean and dry.                 Plan:    Per Renal team        ESTHER ADAMSN, RN  Acute Dialysis RN  [facility]

## 2024-05-12 NOTE — PLAN OF CARE
Discharge Planner Post-Acute Rehab OT:      Discharge Plan: home with assist and HH OT      Precautions: fall, NWB of RLE, dialysis, sacral wound, elevate residual limb when in bed     Current Status:  ADLs:  Mobility: mod I for slide board to WC from bed  Grooming: IND seated at sink in w/c  Dressing: UB: IND, LB: IND in supine, IND for brace and shoe  Bathing: TBD  Toileting: SB to commode Ax1, assit for lydia cares and clothing depending on continence  IADLs: Pt does most IADL including caregiving for .   Vision/Cognition: intact     Assessment: Focus of session on toileting and LB dressing. Pt frustrated and tearful regarding toileting/current level of function. Incontinence limiting therapy session. -15    Other Barriers to Discharge (DME, Family Training, etc): DME, family training, pt has support from multiple family members and will need to schedule family training since she will most likely still require assist with toileting.     5/4 Pt states her H is staying with his sister and she hopes this will con't.  She reports her brother will help her but she would like to be IND with toileting as he is not likely to assist her with toileting.

## 2024-05-12 NOTE — PLAN OF CARE
Goal Outcome Evaluation:      Plan of Care Reviewed With: patient    Overall Patient Progress: no change    Outcome Evaluation: Pt is alert and oriented x4 , denies pain. Mod I SB to bed from W/C , SBA transfers to commode. Dialysis was done this evening. Incontinent of BM this shift. call light within reach,safety alarms is in place.

## 2024-05-12 NOTE — PLAN OF CARE
Goal Outcome Evaluation:    Overall Patient Progress: no change    Outcome Evaluation: No change in pt progress this shift    Pt is A/O x4. No impulsive behavior overnight, call appropriately to make needs known. Denied pain, SOB, chest pain, or new symptoms. Incontinent B/B, x2 incontinent loose stools overnight. Minimal urine output due to dialysis. Transfers Mod-I with slide board to bed <> w/c and SBA x1 with slide board w/c <> commode. Right CVC is WDL. Right AKA dressing C/D/I. Nicotine patch to right shoulder. Pt appeared asleep between cares. Fall precautions in place, call light in reach, bed alarm on. Plan of care ongoing.

## 2024-05-12 NOTE — PROGRESS NOTES
[facility]   Acute Rehabilitation Unit  Daily progress note    INTERVAL HISTORY  Shirley Hendricks was seen and examined at bedside this morning. Loose stools. Encouraged adding Probiotic, banana. Increased Free water to 1200 ml. Planning TCU in order to facilitate transitioning to her home given that she does not have any current supports.     Denies being light headed or dizzy.  On 3 gram Na. Na 130.  Functional Update:     PT:  Current Status:  Bed Mobility: Humaira   Transfer: Humaira slideboard bed<>w/c, Siddharth for commode.   Gait: unable, unsafe  Stairs: not tested  Balance: Stable dynamic sitting.      Assessment: Family training completed w/ brother Alan and lucille. Per discussion w/ pt and brother they seem to have safe plan in place to assist pt at home. Pt will be home alone for very few hours at a time during the week.     OT:  ADLs:  Mobility: mod I for slide board to WC from bed  Grooming: IND seated at sink in w/c  Dressing: UB: IND, LB: IND in supine, IND for brace and shoe  Bathing: TBD  Toileting: SB to commode Ax1, assit for lydia cares and clothing depending on continence  IADLs: Pt does most IADL including caregiving for .   Vision/Cognition: intact     Assessment: pt refused most treatment options due to pt feeling discouraged w/ life situation and dialysis running late, agreed to discussion of POC . See doc flow for details.  Other Barriers to Discharge (DME, Family Training, etc): DME, family training, pt has support from multiple family members and will need to schedule family training since she will most likely still require assist with toileting.       MEDICATIONS  Current Facility-Administered Medications   Medication Dose Route Frequency Provider Last Rate Last Admin    amLODIPine (NORVASC) tablet 10 mg  10 mg Oral Daily Corrina Hurt PA-C   10 mg at 05/12/24 0925    bumetanide (BUMEX) tablet 4 mg  4 mg Oral Once per day on Sunday Monday  Wednesday Friday Corrina Hurt PA-C   4 mg at 05/12/24 0944    carvedilol (COREG) tablet 6.25 mg  6.25 mg Oral BID w/meals Corrina Hurt PA-C   6.25 mg at 05/12/24 0925    cetirizine (zyrTEC) tablet 5 mg  5 mg Oral Daily Corrina Hurt PA-C   5 mg at 05/12/24 0924    clopidogrel (PLAVIX) tablet 75 mg  75 mg Oral Daily Corrina Hurt PA-C   75 mg at 05/12/24 0925    famotidine (PEPCID) tablet 20 mg  20 mg Oral Q48H Corrina Hurt PA-C   20 mg at 05/10/24 1555    fluticasone-vilanterol (BREO ELLIPTA) 200-25 MCG/ACT inhaler 1 puff  1 puff Inhalation Daily Corrina Hurt PA-C   1 puff at 05/12/24 0923    gabapentin (NEURONTIN) capsule 200 mg  200 mg Oral Once per day on Tuesday Thursday Saturday Corrina Hurt PA-C   200 mg at 05/11/24 2154    heparin ANTICOAGULANT injection 5,000 Units  5,000 Units Subcutaneous Q12H Corrina Hurt PA-C   5,000 Units at 05/12/24 0926    lactobacillus rhamnosus (GG) (CULTURELL) capsule 1 capsule  1 capsule Oral BID Corrina Hurt PA-C   1 capsule at 05/12/24 0924    levofloxacin (LEVAQUIN) tablet 500 mg  500 mg Oral Every Other Day Maya Watkins MD   500 mg at 05/10/24 1057    miconazole (MICATIN) 2 % powder   Topical BID Corrina Hurt PA-C   Given at 05/11/24 2155    multivitamin RENAL (TRIPHROCAPS) capsule 1 capsule  1 capsule Oral Daily Corrina Hurt PA-C   1 capsule at 05/12/24 1227    nicotine (NICODERM CQ) 14 MG/24HR 24 hr patch 1 patch  1 patch Transdermal Daily Corrina Hurt PA-C   1 patch at 05/12/24 0931    polyethylene glycol (MIRALAX) Packet 17 g  17 g Oral Daily Corrina Hurt PA-C   17 g at 05/10/24 1104    rosuvastatin (CRESTOR) tablet 10 mg  10 mg Oral At Bedtime Corrina Hurt PA-C   10 mg at 05/11/24 2111    senna-docusate (SENOKOT-S/PERICOLACE) 8.6-50 MG per tablet 1-2 tablet  1-2 tablet Oral BID Corrina Hurt PA-C   1 tablet at 05/11/24 1010    silver sulfADIAZINE  "(SILVADENE) 1 % cream   Topical Daily Gala Lo MD   Given at 05/11/24 1019          Current Facility-Administered Medications   Medication Dose Route Frequency Provider Last Rate Last Admin    acetaminophen (TYLENOL) tablet 500-1,000 mg  500-1,000 mg Oral Q6H PRN Corrina Hurt PA-C   1,000 mg at 05/10/24 2107    [Held by provider] diphenoxylate-atropine (LOMOTIL) 2.5-0.025 MG per tablet 1 tablet  1 tablet Oral 4x Daily PRN Corrina Hurt PA-C   1 tablet at 04/30/24 1538    HYDROmorphone (DILAUDID) tablet 2 mg  2 mg Oral Q6H PRN Corrina Hurt PA-C   2 mg at 05/07/24 2134    naloxone (NARCAN) injection 0.2 mg  0.2 mg Intravenous Q2 Min PRN Arun Pimentel MD        Or    naloxone (NARCAN) injection 0.4 mg  0.4 mg Intravenous Q2 Min PRN Arun Pimentel MD        Or    naloxone (NARCAN) injection 0.2 mg  0.2 mg Intramuscular Q2 Min PRN Arun Pimentel MD        Or    naloxone (NARCAN) injection 0.4 mg  0.4 mg Intramuscular Q2 Min PRN Arun Pimentel MD        nitroGLYcerin (NITROSTAT) sublingual tablet 0.4 mg  0.4 mg Sublingual Q5 Min PRN Corrina Hurt PA-C        ondansetron (ZOFRAN ODT) ODT tab 4 mg  4 mg Oral Q6H PRN Corrina Hurt PA-C   4 mg at 05/09/24 0936    polyethylene glycol (MIRALAX) powder 17 g  17 g Oral Daily PRN Corrina Hurt PA-C            PHYSICAL EXAM  BP (!) 161/62 (BP Location: Left arm)   Pulse 69   Temp 98.7  F (37.1  C) (Oral)   Resp 18   Ht 1.549 m (5' 0.98\")   Wt 55 kg (121 lb 4.1 oz)   LMP  (LMP Unknown)   SpO2 94%   BMI 22.92 kg/m    Gen: NAD, laying comfortably in bed  HEENT: NC/AT, MMM, EOMI, PERRLA  Cardio: RRR, no M/R  Pulm: CTA bilaterally non-labored on room air  Abd: soft, non-tender, non-distended  Ext: some edema in RLE; charcot foot deformity on left; R AKA   Neuro/MSK: Awake, alert, moving all extremities actively in bed      LABS  CBC RESULTS:   Recent Labs   Lab Test 05/09/24  0655 05/07/24  0553 05/02/24  0551   WBC 3.2* 3.3* 3.6* "   RBC 3.20* 3.13* 2.80*   HGB 9.7* 9.4* 8.7*   HCT 30.8* 29.4* 26.9*   MCV 96 94 96   MCH 30.3 30.0 31.1   MCHC 31.5 32.0 32.3   RDW 17.6* 17.5* 18.4*    202 223       Last Basic Metabolic Panel:  Recent Labs   Lab Test 05/12/24  0554 05/11/24  1951 05/11/24  0552 05/10/24  0607   *  --  130* 128*   POTASSIUM 3.8  --  4.5 4.2   CHLORIDE 99  --  97* 98   CO2 25  --  25 27   ANIONGAP 8  --  8 3*   GLC 75 131* 69* 78   BUN 8.2  --  16.8 10.7   CR 2.07*  --  3.14* 2.54*   GFRESTIMATED 26*  --  16* 20*   SONIA 7.5*  --  7.1* 7.2*         Rehabilitation - continue comprehensive acute inpatient rehabilitation program with multidisciplinary approach including therapies, rehab nursing, and physiatry following. See interval history for updates.      ASSESSMENT AND PLAN  Shirley Hendricks is a 63 year old right hand dominant female with complicated past medical history including but not limited to PAD with multiple prior bilateral lower extremity vascular bypass grafts and thrombectomies (most recently 10/2023 right common femoral to proximal anterior tibial PTFE bypass graft), bilateral Charcot-Breonna-Tooth foot deformity, prior bilateral carotid endarterectomies, B-cell lymphoma, CAD (hx Mix3), hypertension, hyperlipidemia, GI bleed (12/2023), type 2 diabetes mellitus, COPD, tobacco use disorder, iron deficiency anemia, GERD, Celiac disease, Takotsubo cardiomyopathy, SVT, depression/anxiety and spongiotic dermatitis who was admitted on 3/29/24 with acute occlusion of right lower extremity arterial bypass graft now s/p right above-knee amputation 4/1/24, completed 4/9/24 with hospital course complicated by acute renal failure now on dialysis, sepsis, acute blood loss anemia, acute post-operative pain, nausea, loose stools, delirium, malnutrition, and multiple electrolyte derangements.  She is now admitted to ARU on 4/22/24 for multidisciplinary rehabilitation and ongoing medical management.        Admission to  acute inpatient rehab 04/22/24.    Impairment group code: Amputation 05.3 Unilateral LE AKA; emergent R AKA due to acute occlusion of RLE bypass graft         PT and OT 90 minutes of each daily for 6 days per week in addition to rehab nursing and close management of physiatrist.       Impairment of ADL's: Noted to have impaired activity tolerance, impaired balance, impaired strength, impaired weight shifting, and pain, all affecting her ability to safely and independently perform basic ADLs.  Goal for mod I with basic wheelchair-based ADLs with assist for bathing, as well as assist IADLs/heavier activities.     Impairment of mobility:  Noted to have impaired activity tolerance, impaired balance, impaired strength, impaired weight shifting, and pain, all affecting her ability to safely and independently perform basic mobility.  Goal for mod I with basic wheelchair-based mobility.     Impairment of cognition/language/swallow:  Noted to have dysphagia with goals for safe tolerance of least restrictive diet.  However, IP SLP noting did not anticipate further SLP services at ARU, no issues with tolerating regular diet.  Will not consult initially.  If any concern for difficulty tolerating current diet, can consult.     Medical Conditions  New actions/orders/updates for today are in blue.     S/p right AKA 4/1/24, completed 4/9/24 due to critical limb ischemia, acute occlusion of right common femoral artery to mid anterior tibial artery bypass graft   PAD/PVD with hx multiple prior vascular interventions to BLE  Bilateral Charcot-Breonna-Tooth foot deformities  Acute post-op pain  - Wound care: daily dressing changes to right AKA with xeroform and gauze with kerlix to keep wound protected  - Dehiscence of lateral surgical incision with some increased serosanguinous drainage.  Also with multiple areas with necrotic appearance.  Overall similar to day of ARU admission although with more drainage.  Vascular surgery  consulted/assessed on 4/28.  Appreciate assist.  Recommended ongoing daily dressing changes with Xeroform, gauze fluffs, loosely wrapped Kerlix.  DO NOT use ace wrap.  Stockinette can be placed into over-the-shoulder sling to prevent right AKA dressing from falling off during therapies.  Silvadene topical also added per vascular surgery on 4/29, to be applied daily to incision.  Some increased erythema noted at surgical site on 5/2.  Vascular surgery reassessed 5/3 and felt overall appearance not concerning.  Will likely eventually need debridement of anterior thigh but that will be deferred/evaluated at OP follow-up with Dr. Hernandez.  No indication for antibiotics at this time.  Continue current plan of care.  - Also with significant itching at surgical site.  Recently seen by derm for dermatitis as below and recommended to trial zyrtec or claritin for itching.  Started zyrtec 5 mg daily on 4/25.  - NWB RLE  - Continue PTA L foot custom orthotic   - Continue Plavix 75 mg daily (given hx left bypass).  No ongoing need for Xarelto per vascular (no recent DVT or PE)  - Pain management: APAP 500-1000 mg q6h PRN, dilaudid 2 mg q6h PRN, gabapentin 200 mg 3x/week after HD (renally dosed).  Wean opioids as able.   - Continue PT/OT  - Follow up with vascular surgery in 2-4 weeks (~5/28)     Acute blood loss anemia on anemia of chronic disease, hx iron deficiency  Retroperitoneal hematoma  Recent GI bleed (hospitalized 12/2023)  Required intermittent transfusion during this admission (3/30, 3/31, 4/2, 4/6, 4/9, 4/13).  Repeat CT abdomen on 4/20 with stable right retroperitoneal hematoma; no s/o active bleed.  5/9: Hgb stable at 9.7  - Repeat CT abd/pelvis on 5/2 demonstrated decreased small right retroperitoneal hematoma (iatrogenic)  - Continue epo/venofer with HD per nephrology  - Trend CBC every T/Th/Sat  - Transfuse for Hgb <7     Acute renal failure on HD  Hyponatremia  Normal baseline Cr, though per nephrology, suspect  some modest CKD.  LIMA this admission, up to peak Cr 4/22 on 4/3.  Likely BAILEY +/- rhabdo +/- ischemic injury with no signs of recovery and now dialysis dependent (started 4/3/24).  S/p tunneled dialysis catheter placement 4/3/24.  Labs as above.   - Nephrology consulted, appreciate ongoing assistance  - HD T/Th/Sat at ARU or per nephrology recs.  Plan for OP HD at [facility] T/Th/Sat  - S/p diuretic challenge (Lasix 100 mg PO) on 4/29 per nephrology without improved output.  Evidence of fluid overload on CT abd/pelv 5/2 with mod pleural effusions, small volume ascites, diffuse soft tissue anasarca.  Per nephrology, recommended Bumex 4 mg daily on non-dialysis days, started on 5/6.  - Daily renal panel for now per nephrology  - Continue 1L fluid restriction  - Avoid NSAIDs/nephrotoxins, renally dose medications     Bilateral pleural effusion  Noted on CT abdomen 4/20 with moderate b/l pleural effusion (left>right).  Has been intermittently requiring 1-2L supplemental oxygen.  Unclear if related to volume given new renal failure on HD, also baseline COPD.  Repeat CT abd/pelv on 5/2 with moderate pleural effusions  - Monitor respiratory status, supplemental O2 by NC PRN to maintain sats >88%  - Consider thoracentesis if symptomatic or worsening hypoxia      Celiac disease  Colon distension   Loose stools, improving  Nausea/vomiting, improving  Severe malnutrition in context of acute on chronic illness  Pill cam study with [facility] in 3/2024 (due to recent GI bleed), which revealed mild non-erosive red tissue in the duodenum, some atrophic type appearance that could be celiac disease.  This was followed by celiac labs on 3/20/24 (Gliadin ab and tTG IgA antibody) which were positive and thus Celiac diagnosis was established and she was recommended for gluten-free diet.  This admission, noted to have colonic distension and circumferential wall thickening of the distal colon and rectum concerning for proctocolitis on CT  "abdomen.  Also with loose stools, nausea.  Despite this, patient declined gluten-free diet (switched on 4/18).  Noted to have improvement in loose stools and nausea at discharge to ARU per sending team.  However, has persisted with loose stools, intermittent nausea and vomiting.  Repeat cdiff on 4/28 negative.  Suspect symptoms are due to Celiac disease and non-compliance with gluten-free diet.  Patient agreeable to trial of gluten-free diet started on 4/28.  Due to recurrent nausea/emesis, ongoing loose stools, + new rebound/guarding in right abdomen, CT abdomen/pelvis obtained on 5/2 which showed \"persistent wall thickening of the descending and sigmoid colon in addition to the rectum, similar to prior study. This may also be due  to overall third spacing of fluid versus colitis given diarrhea history. Large volume of stool.\"  Loose stools could represent obstructive diarrhea.  Lomotil held, scopolamine stopped.  Started on bowel protocol.  Improvement in abdominal pain, nausea, vomiting.  Ongoing loose stools but improving in consistency per patient, frequency varies and continues to cause incontinence.  AXR with nonobstructive bowel gas pattern, moderate colonic stool burden, overall not significantly changed.  S/p enema on 5/6 with several subsequent loose Bms.  Daily Miralax added 5/7.  - Continue gluten-free diet  - RD consulted, appreciate assistance  - AXR  shows improvement on stool burden.  Will continue with current plan and will monitor.  - Continue senokot-S 1-2 tabs BID scheduled  - Continue Miralax daily  - PRN Zofran  - Continue probiotics BID  - Monitor symptoms  - Follow up with [facility] as outpatient     Abnormal UA  Given ongoing nausea/vomiting and abdominal/pelvic pain, as well as bladder wall thickening on CT, UA obtained to rule out UTI.   Results with large proteinuria, negative nitrites, small hematuria, large leuk esterase, many bacteria.  UC growing >100k colonies candida and 10-50k colonies " of E. Coli.  Started on levofloxacin.  - Continue levofloxacin 500 mg every other day (thru 5/12)  - No clear urinary symptoms.     Hypomagnesemia  Replaced mag IV intermittently.  5/10 mag increased to 2.4 from previous 1.7, 2.0 5/11/2024   - Continue to trend     Hypocalcemia  5/7: Ca corrects to 8.6 (mildly low) for hypoalbuminemia.  Have reached out to nephrology to ask about resuming supplement but without response.  - Per pharmacy, was on PTA calcium carbonate/vit D PTA (had run out 1 week before admission), not ordered while at hospital.   - Continue to trend     CAD (hx MI x3)  HTN  Hyperlipidemia  Hx Takotsubo CM  Hx SVT  Last coronary angiogram completed 10/2023.  Recovered EF (55-60%) from ECHO 11/2023.  - Continue PTA Coreg 6.25 mg BID  - Continue amlodipine 10 mg daily (increased 5/8 per nephrology)  - Continue Bumex 4 mg on non-dialysis days (added 5/6 per nephrology)  - Hold PTA lisinopril 10 mg daily due to renal failure  - PTA Jardiance 10 mg daily discontinued due to renal failure  - Rosuvastatin 10 mg daily resumed on ARU admission (previously held due to concern for rhabdo).  5/7: repeat hepatic panel WNL (with exception of low total protein)    - Monitor BP  - Was to follow up with cardiology in Feb 2024, should be scheduled after discharge     Murmur  Noted on exam this admission by attending physiatrist.  Per chart review, intermittently documented in the past with questionable/subtle murmur (though not by cardiology).  Most recent echo 11/7/23 with trace mitral regurg, trace tricuspid regurg, and moderate trileaflet aortic sclerosis.  - Folllow up with cardiology as above     Hx bilateral carotid artery stenosis s/p bilateral carotid endarterectomies  - Follow up with vascular surgery for annual carotid duplex (last done on 1/4/24 with stable stenosis of right carotid bulb)     Unclear hx Diabetes mellitus, type II  Listed as diagnosis per chart review.  However, family reports that patient  has never been diagnosed with diabetes and no A1c that is available in her chart reflects diabetic range.  A1c this admission 5.2%.  PTA Jardiance and gabapentin discontinued due to acute renal failure.  BG well-controlled during this admission without need for insulin.  - Monitor BG periodically with labs     B-cell lymphoma, stage VALENTIN  Followed by [facility] (Dr. Barrow).  Mild radiographic progression on CT 1/26/24.  Given asymptomatic, plans at that time for ongoing CT monitoring.  - Monitor for development of any B symptoms  - Trend CBC  - Follow up with oncology, repeat CT as planned in 7/2024     COPD  Tobacco use disorder  PTA smoking ~5 cigarettes per day  - Monitor respiratory status, supplemental O2 by NC PRN to maintain sats >88%  - Continue PTA Breo Ellipta  - Nicotine patch 14 mcg/day  - Ongoing education/resources for cessation     Adjustment disorder with mixed mood  Hx MDD/anxiety (per chart though patient denies)  Delirium, resolved  Not on medications PTA.  Patient denies any hx depression/anxiety but does note depressed mood in setting of recent AKA and significant home stressors.  - Psychology and spiritual services consulted, appreciate assist  - Monitor mood     GERD  PTA on omeprazole 20 mg daily  - Continue pepcid 20 mg q48 hours (renally dosed) given allergy to pantoprazole (formulary sub for omeprazole)     Spongiotic dermatitis  Recently seen by OP dermatology, recommended betamethasone cream BID PRN, not using regularly at hospital  - Consider resumption of topical steroids if recurrent symptoms  - Started Zyrtec 4/25 as above for itching near surgical site     Sacral wound: pressure injury (stage 2 vs 3), incontinence-associated dermatitis, moisture-associated skin damage  Assessed by [facility]OCN on 4/22 at [facility] hospital.  - WOCN consulted for ongoing follow-up at ARU  - Wound care per WOCN orders  - Pressure reduction strategies     Endometrial thickening  Noted incidentally on CT  abdomen/pelvis.  Patient denies postmenopausal bleeding.  - PCP to follow-up, consider outpatient pelvic ultrasound to better assess     Adjustment to disability:  Clinical psychology to eval and treat if indicated  FEN: gluten free diet, 1200 mL fluid restriction  Bowel: incontinent, recent loose stools as above  Bladder: incontinent  DVT Prophylaxis: subcutaneous heparin  GI Prophylaxis: pepcid  Code: full, confirmed on admission  Disposition: Awaiting TCU acceptance  ELOS: pending discharge location  Follow up Appointments on Discharge: PCP in 1-2 weeks, vascular surgery in 2 weeks, [facility], nephrology (ongoing HD), heme/onc ([facility], 7/2024)    Doing well. Discussed with team. Continue cares and plans outlined.    Husam Coulter MD

## 2024-05-13 ENCOUNTER — APPOINTMENT (OUTPATIENT)
Dept: PHYSICAL THERAPY | Facility: CLINIC | Age: 64
DRG: 559 | End: 2024-05-13
Attending: PHYSICAL MEDICINE & REHABILITATION
Payer: COMMERCIAL

## 2024-05-13 ENCOUNTER — APPOINTMENT (OUTPATIENT)
Dept: OCCUPATIONAL THERAPY | Facility: CLINIC | Age: 64
DRG: 559 | End: 2024-05-13
Attending: PHYSICAL MEDICINE & REHABILITATION
Payer: COMMERCIAL

## 2024-05-13 ENCOUNTER — HOSPITAL ENCOUNTER (OUTPATIENT)
Facility: CLINIC | Age: 64
End: 2024-05-13
Attending: SURGERY | Admitting: SURGERY
Payer: COMMERCIAL

## 2024-05-13 DIAGNOSIS — T87.9 AKA STUMP COMPLICATION (H): Primary | ICD-10-CM

## 2024-05-13 LAB
ALBUMIN SERPL BCG-MCNC: 1.9 G/DL (ref 3.5–5.2)
ANION GAP SERPL CALCULATED.3IONS-SCNC: 8 MMOL/L (ref 7–15)
BUN SERPL-MCNC: 14.2 MG/DL (ref 8–23)
CALCIUM SERPL-MCNC: 7.4 MG/DL (ref 8.8–10.2)
CHLORIDE SERPL-SCNC: 96 MMOL/L (ref 98–107)
CREAT SERPL-MCNC: 2.64 MG/DL (ref 0.51–0.95)
DEPRECATED HCO3 PLAS-SCNC: 25 MMOL/L (ref 22–29)
EGFRCR SERPLBLD CKD-EPI 2021: 19 ML/MIN/1.73M2
GLUCOSE SERPL-MCNC: 68 MG/DL (ref 70–99)
MAGNESIUM SERPL-MCNC: 1.9 MG/DL (ref 1.7–2.3)
PHOSPHATE SERPL-MCNC: 2.6 MG/DL (ref 2.5–4.5)
POTASSIUM SERPL-SCNC: 4.3 MMOL/L (ref 3.4–5.3)
SODIUM SERPL-SCNC: 129 MMOL/L (ref 135–145)

## 2024-05-13 PROCEDURE — 97535 SELF CARE MNGMENT TRAINING: CPT | Mod: GO | Performed by: STUDENT IN AN ORGANIZED HEALTH CARE EDUCATION/TRAINING PROGRAM

## 2024-05-13 PROCEDURE — 97110 THERAPEUTIC EXERCISES: CPT | Mod: GP

## 2024-05-13 PROCEDURE — 36415 COLL VENOUS BLD VENIPUNCTURE: CPT | Performed by: PHYSICAL MEDICINE & REHABILITATION

## 2024-05-13 PROCEDURE — 99232 SBSQ HOSP IP/OBS MODERATE 35: CPT | Performed by: PHYSICAL MEDICINE & REHABILITATION

## 2024-05-13 PROCEDURE — 250N000013 HC RX MED GY IP 250 OP 250 PS 637: Performed by: PHYSICAL MEDICINE & REHABILITATION

## 2024-05-13 PROCEDURE — 128N000003 HC R&B REHAB

## 2024-05-13 PROCEDURE — 250N000013 HC RX MED GY IP 250 OP 250 PS 637: Performed by: PHYSICIAN ASSISTANT

## 2024-05-13 PROCEDURE — 250N000011 HC RX IP 250 OP 636: Performed by: PHYSICIAN ASSISTANT

## 2024-05-13 PROCEDURE — 82374 ASSAY BLOOD CARBON DIOXIDE: CPT | Performed by: PHYSICIAN ASSISTANT

## 2024-05-13 PROCEDURE — 82040 ASSAY OF SERUM ALBUMIN: CPT | Performed by: PHYSICIAN ASSISTANT

## 2024-05-13 PROCEDURE — 97530 THERAPEUTIC ACTIVITIES: CPT | Mod: GP

## 2024-05-13 PROCEDURE — 83735 ASSAY OF MAGNESIUM: CPT | Performed by: PHYSICAL MEDICINE & REHABILITATION

## 2024-05-13 RX ADMIN — SENNOSIDES AND DOCUSATE SODIUM 1 TABLET: 8.6; 5 TABLET ORAL at 21:39

## 2024-05-13 RX ADMIN — ONDANSETRON 4 MG: 4 TABLET, ORALLY DISINTEGRATING ORAL at 10:56

## 2024-05-13 RX ADMIN — SILVER SULFADIAZINE: 10 CREAM TOPICAL at 10:07

## 2024-05-13 RX ADMIN — MICONAZOLE NITRATE: 20 POWDER TOPICAL at 21:38

## 2024-05-13 RX ADMIN — NICOTINE 1 PATCH: 14 PATCH, EXTENDED RELEASE TRANSDERMAL at 10:04

## 2024-05-13 RX ADMIN — CARVEDILOL 6.25 MG: 6.25 TABLET, FILM COATED ORAL at 10:05

## 2024-05-13 RX ADMIN — CLOPIDOGREL BISULFATE 75 MG: 75 TABLET ORAL at 10:05

## 2024-05-13 RX ADMIN — MICONAZOLE NITRATE: 20 POWDER TOPICAL at 10:07

## 2024-05-13 RX ADMIN — CARVEDILOL 6.25 MG: 6.25 TABLET, FILM COATED ORAL at 17:56

## 2024-05-13 RX ADMIN — ACETAMINOPHEN 1000 MG: 500 TABLET ORAL at 21:48

## 2024-05-13 RX ADMIN — BUMETANIDE 4 MG: 1 TABLET ORAL at 10:10

## 2024-05-13 RX ADMIN — Medication 1 CAPSULE: at 10:05

## 2024-05-13 RX ADMIN — Medication 1 CAPSULE: at 21:37

## 2024-05-13 RX ADMIN — ROSUVASTATIN CALCIUM 10 MG: 5 TABLET, COATED ORAL at 21:37

## 2024-05-13 RX ADMIN — CETIRIZINE HYDROCHLORIDE 5 MG: 5 TABLET ORAL at 10:04

## 2024-05-13 RX ADMIN — HEPARIN SODIUM 5000 UNITS: 5000 INJECTION, SOLUTION INTRAVENOUS; SUBCUTANEOUS at 10:06

## 2024-05-13 RX ADMIN — FLUTICASONE FUROATE AND VILANTEROL TRIFENATATE 1 PUFF: 200; 25 POWDER RESPIRATORY (INHALATION) at 10:08

## 2024-05-13 RX ADMIN — AMLODIPINE BESYLATE 10 MG: 10 TABLET ORAL at 10:04

## 2024-05-13 RX ADMIN — HEPARIN SODIUM 5000 UNITS: 5000 INJECTION, SOLUTION INTRAVENOUS; SUBCUTANEOUS at 21:38

## 2024-05-13 ASSESSMENT — ACTIVITIES OF DAILY LIVING (ADL)
ADLS_ACUITY_SCORE: 36

## 2024-05-13 NOTE — PLAN OF CARE
Discharge Planner Post-Acute Rehab OT:      Discharge Plan: home with assist and HH OT or TCU     Precautions: fall, NWB of RLE, dialysis, sacral wound, elevate residual limb when in bed     Current Status:  ADLs:  Mobility: mod I for slide board to WC from bed  Grooming: IND seated at sink in w/c  Dressing: UB: IND, LB: IND in supine, IND for brace and shoe  Bathing: TBD  Toileting: SB to commode Ax1, assit for lydia cares and clothing depending on continence  IADLs: Pt does most IADL including caregiving for .   Vision/Cognition: intact     Assessment: We continue to problem solve toileting to avoid incontinence episodes. Pt may need to start using a bedpan to avoid incontinence in her brief but pt is reluctant for this idea. Pt reports she has not yet gotten a commode. Pt and family has had this information for a while ow and pt was advised to purchase one ASAP.     PM session pt was very upset, crying and expressing her frustration with her situation especially with her bowels, dialysis, meds, incontinence. Therapist listened to pt and encouraged her to reach out to her sister who she identified as a source of emotions support. -10     Other Barriers to Discharge (DME, Family Training, etc): DME, family training, pt has support from multiple family members and will need to schedule family training since she will most likely still require assist with toileting.     5/4 Pt states her H is staying with his sister and she hopes this will con't.  She reports her brother will help her but she would like to be IND with toileting as he is not likely to assist her with toileting.

## 2024-05-13 NOTE — PLAN OF CARE
Goal Outcome Evaluation:      Plan of Care Reviewed With: patient    Overall Patient Progress: no change    VS: VSS, AOX4   O2: RA   Output: Incontinent    Last BM: 5/12   Activity: AX1 sliding board to wheel chair    Skin: Refer to flowsheet   Pain: denies   CMS: AKA   Dressing: CDI   Diet: Refer to orders   LDA: R CVC  L PIV; SL   Additional Info: Pt nauseated after taking morning medications.   1 episode of emesis managed with PRN zofran.    No acute changes during this shift, call light within reach, continue with plan of care.

## 2024-05-13 NOTE — PLAN OF CARE
Goal Outcome Evaluation:      Plan of Care Reviewed With: patient    Overall Patient Progress: no changeOverall Patient Progress: no change    Outcome Evaluation: Pt alert and oriented x4.   C/o whole back pain, prn tylenol given per request x1.   VSS except /67 at 1810, scheduled coreg given.   Mg 2.1 after replacement, recheck Mg level tomorrow am. PIV in left forearm saline locked.  Incontinent of loose stool x1 this shift, total x3 today per pt.   Pt reports that she voided x2 today. On HD, R internal jugular catheter dressing CDI.  Emesis x1 at HS, prn zofran given with some relief.   R AKA stump incision/wound dressing changed, scant amount of drainage and erythema/escar noted. Sacral mepilex dressing CDI.  Left foot edema 2-3+, tubigrip applied and LLE elevated on pillow.   Continue with POC.

## 2024-05-13 NOTE — PLAN OF CARE
"Goal Outcome Evaluation:    VS: BP (!) 150/67 (BP Location: Left arm, Patient Position: Semi-Myers's)   Pulse 72   Temp 97.6  F (36.4  C) (Oral)   Resp 16   Ht 1.549 m (5' 0.98\")   Wt 55 kg (121 lb 4.1 oz)   LMP  (LMP Unknown)   SpO2 98%   BMI 22.92 kg/m       O2: 98% RA   Output: HD   Last BM: 5/12/2024   Activity: Mod I; slide board SBA x1   Skin: R AKA stump   Pain: no   CMS: Denies SOB, chest pain, n/v, headache, numbness or tingling   Dressing: R AKA    Diet: Gluten free- celiac; 1200 ml fluid restriction   LDA: L PIV saline locked; R CVC HD line   Equipment: Slide board, commode   Plan: Continue POC   Additional Info: Mg 1.5 replaced;   Reordered for AM  L foot brace                          "

## 2024-05-13 NOTE — PROGRESS NOTES
TCU placement pending. JUANITA called and spoke with pt's sister, Yue, and provided updates on TCU referrals. Yue requested referrals also be sent to: [facility], [facility], and [facility] in [facility]. Referrals sent today.     JUANITA called [facility] to provide an update that discharge date is currently TBD.    Pending:  [facility] (PH: [phone])  5/9: referral sent  5/10: Resent per request  5/13: SW spoke with Maggie in admissions. No female beds today or tomorrow. SW will follow up on Wednesday if bed is still needed     [facility] (PH: [phone])  5/9: referral sent  5/10: LVM  5/13: JUANITA spoke with Diane in admissions, referral is still being reviewed.     [facility] (PH: [phone])  5/9: referral sent  5/10: LVM  5/13: SW left a voicemail for admissions     [facility] ([facility])  5/13: Referral sent    [facility]  5/13: Referral sent    [facility]  5/13: Referral sent - DECLINED, unable to meet pt's needs    ADDENDUM: JUANITA updated by [facility] Kinsey that pt's referral was declined due to acuity, not enough staffing to meet pt's needs    Declined  [facility] - no bed available  [facility] - unable to meet pt's needs    DAWN Faith  Post Acute Float   ARU/TCU/LTACH    Phone: [phone]  Fax: [phone]

## 2024-05-13 NOTE — PROGRESS NOTES
"  [facility]   Acute Rehabilitation Unit  Daily progress note    INTERVAL HISTORY  Weekend and therapy notes reviewed, no acute events reported.  She is somewhat emotional this morning as she continues to have loose stools.  When I discussed the likelihood of obstructive diarrhea with her and the requirement for ongoing stool softeners she was quite unhappy about it.  She mentions wanting to be back to her normal self again with normal bowel movement and ability to walk.  She denies any issues with pain.  No reports of difficulty with sleep at nighttime.    FUNCTIONAL UPDATES:    PT:  Current Status:  Bed Mobility: Humaira   Transfer: Humaira slideboard bed<>w/c, Siddharth for commode.   Gait: unable, unsafe  Stairs: not tested  Balance: Stable dynamic sitting.      Assessment: Pt verbalizing improved understanding and execution of head/hips relationship w/ transfers and functional mobility. -60 in PM d/t refusal/\"having a meltdown\".     OT:  Current Status:  ADLs:  Mobility: mod I for slide board to WC from bed  Grooming: IND seated at sink in w/c  Dressing: UB: IND, LB: IND in supine, IND for brace and shoe  Bathing: TBD  Toileting: SB to commode Ax1, assit for lydia cares and clothing depending on continence  IADLs: Pt does most IADL including caregiving for .   Vision/Cognition: intact     Assessment: We continue to problem solve toileting to avoid incontinence episodes. Pt may need to start using a bedpan to avoid incontinence in her brief but pt is reluctant for this idea. Pt reports she has not yet gotten a commode. Pt and family has had this information for a while ow and pt was advised to purchase one ASAP.      PM session pt was very upset, crying and expressing her frustration with her situation especially with her bowels, dialysis, meds, incontinence. Therapist listened to pt and encouraged her to reach out to her sister who she identified as a source of emotions " support.          MEDICATIONS  Current Facility-Administered Medications   Medication Dose Route Frequency Provider Last Rate Last Admin    amLODIPine (NORVASC) tablet 10 mg  10 mg Oral Daily Corrina Hurt PA-C   10 mg at 05/12/24 0925    bumetanide (BUMEX) tablet 4 mg  4 mg Oral Once per day on Sunday Monday Wednesday Friday Corrina Hurt PA-C   4 mg at 05/12/24 0944    carvedilol (COREG) tablet 6.25 mg  6.25 mg Oral BID w/meals Corrina Hurt PA-C   6.25 mg at 05/12/24 1810    cetirizine (zyrTEC) tablet 5 mg  5 mg Oral Daily Corrina Hurt PA-C   5 mg at 05/12/24 0924    clopidogrel (PLAVIX) tablet 75 mg  75 mg Oral Daily Corrina Hurt PA-C   75 mg at 05/12/24 0925    famotidine (PEPCID) tablet 20 mg  20 mg Oral Q48H Corrina Hurt PA-C   20 mg at 05/12/24 1638    fluticasone-vilanterol (BREO ELLIPTA) 200-25 MCG/ACT inhaler 1 puff  1 puff Inhalation Daily Corrina Hurt PA-C   1 puff at 05/12/24 0923    gabapentin (NEURONTIN) capsule 200 mg  200 mg Oral Once per day on Tuesday Thursday Saturday Corrina Hurt PA-C   200 mg at 05/11/24 2154    heparin ANTICOAGULANT injection 5,000 Units  5,000 Units Subcutaneous Q12H Corrina Hurt PA-C   5,000 Units at 05/12/24 2040    lactobacillus rhamnosus (GG) (CULTURELL) capsule 1 capsule  1 capsule Oral BID Husam Coulter MD   1 capsule at 05/12/24 2040    miconazole (MICATIN) 2 % powder   Topical BID Corrina Hurt PA-C   Given at 05/12/24 2041    multivitamin RENAL (TRIPHROCAPS) capsule 1 capsule  1 capsule Oral Daily Corrina Hurt PA-C   1 capsule at 05/12/24 1227    nicotine (NICODERM CQ) 14 MG/24HR 24 hr patch 1 patch  1 patch Transdermal Daily Corrina Hurt PA-C   1 patch at 05/12/24 0931    polyethylene glycol (MIRALAX) Packet 17 g  17 g Oral Daily Corrina Hurt PA-C   17 g at 05/10/24 1104    rosuvastatin (CRESTOR) tablet 10 mg  10 mg Oral At Bedtime Corrina Hurt PA-C   " 10 mg at 05/12/24 2040    senna-docusate (SENOKOT-S/PERICOLACE) 8.6-50 MG per tablet 1-2 tablet  1-2 tablet Oral BID Corrina Hurt PA-C   1 tablet at 05/11/24 1010    silver sulfADIAZINE (SILVADENE) 1 % cream   Topical Daily Gala Lo MD   Given at 05/12/24 1817          Current Facility-Administered Medications   Medication Dose Route Frequency Provider Last Rate Last Admin    acetaminophen (TYLENOL) tablet 500-1,000 mg  500-1,000 mg Oral Q6H PRN Corrina Hurt PA-C   1,000 mg at 05/12/24 1817    [Held by provider] diphenoxylate-atropine (LOMOTIL) 2.5-0.025 MG per tablet 1 tablet  1 tablet Oral 4x Daily PRN Corrina Hurt PA-C   1 tablet at 04/30/24 1538    HYDROmorphone (DILAUDID) tablet 2 mg  2 mg Oral Q6H PRN Corrina Hurt PA-C   2 mg at 05/07/24 2134    naloxone (NARCAN) injection 0.2 mg  0.2 mg Intravenous Q2 Min PRN Arun Pimentel MD        Or    naloxone (NARCAN) injection 0.4 mg  0.4 mg Intravenous Q2 Min PRN Arun Pimentel MD        Or    naloxone (NARCAN) injection 0.2 mg  0.2 mg Intramuscular Q2 Min PRN Arun Pimentel MD        Or    naloxone (NARCAN) injection 0.4 mg  0.4 mg Intramuscular Q2 Min PRN Arun Pimentel MD        nitroGLYcerin (NITROSTAT) sublingual tablet 0.4 mg  0.4 mg Sublingual Q5 Min PRN Corrina Hurt PA-C        ondansetron (ZOFRAN ODT) ODT tab 4 mg  4 mg Oral Q6H PRN Corrina Hurt PA-C   4 mg at 05/12/24 2051    polyethylene glycol (MIRALAX) powder 17 g  17 g Oral Daily PRN Corrina Hurt PA-C            PHYSICAL EXAM  BP (!) 150/67 (BP Location: Left arm, Patient Position: Semi-Myers's)   Pulse 72   Temp 97.6  F (36.4  C) (Oral)   Resp 16   Ht 1.549 m (5' 0.98\")   Wt 53.5 kg (117 lb 15.1 oz)   LMP  (LMP Unknown)   SpO2 98%   BMI 22.30 kg/m      Gen: NAD, laying comfortably in bed  HEENT: NC/AT, MMM, EOMI, PERRLA  Cardio: RRR, no M/R  Pulm: CTA bilaterally non-labored on room air  Abd: soft, non-tender, non-distended  Ext: some " edema in RLE; charcot foot deformity on left; R AKA   Neuro/MSK: Awake, alert, moving all extremities actively in bed      LABS  CBC RESULTS:   Recent Labs   Lab Test 05/09/24  0655 05/07/24  0553 05/02/24  0551   WBC 3.2* 3.3* 3.6*   RBC 3.20* 3.13* 2.80*   HGB 9.7* 9.4* 8.7*   HCT 30.8* 29.4* 26.9*   MCV 96 94 96   MCH 30.3 30.0 31.1   MCHC 31.5 32.0 32.3   RDW 17.6* 17.5* 18.4*    202 223       Last Basic Metabolic Panel:  Recent Labs   Lab Test 05/12/24  0554 05/11/24  1951 05/11/24  0552 05/10/24  0607   *  --  130* 128*   POTASSIUM 3.8  --  4.5 4.2   CHLORIDE 99  --  97* 98   CO2 25  --  25 27   ANIONGAP 8  --  8 3*   GLC 75 131* 69* 78   BUN 8.2  --  16.8 10.7   CR 2.07*  --  3.14* 2.54*   GFRESTIMATED 26*  --  16* 20*   SONIA 7.5*  --  7.1* 7.2*         Rehabilitation - continue comprehensive acute inpatient rehabilitation program with multidisciplinary approach including therapies, rehab nursing, and physiatry following. See interval history for updates.      ASSESSMENT AND PLAN  Shirley Hendricks is a 63 year old right hand dominant female with complicated past medical history including but not limited to PAD with multiple prior bilateral lower extremity vascular bypass grafts and thrombectomies (most recently 10/2023 right common femoral to proximal anterior tibial PTFE bypass graft), bilateral Charcot-Breonna-Tooth foot deformity, prior bilateral carotid endarterectomies, B-cell lymphoma, CAD (hx Mix3), hypertension, hyperlipidemia, GI bleed (12/2023), type 2 diabetes mellitus, COPD, tobacco use disorder, iron deficiency anemia, GERD, Celiac disease, Takotsubo cardiomyopathy, SVT, depression/anxiety and spongiotic dermatitis who was admitted on 3/29/24 with acute occlusion of right lower extremity arterial bypass graft now s/p right above-knee amputation 4/1/24, completed 4/9/24 with hospital course complicated by acute renal failure now on dialysis, sepsis, acute blood loss anemia, acute  post-operative pain, nausea, loose stools, delirium, malnutrition, and multiple electrolyte derangements.  She is now admitted to ARU on 4/22/24 for multidisciplinary rehabilitation and ongoing medical management.        Admission to acute inpatient rehab 04/22/24.    Impairment group code: Amputation 05.3 Unilateral LE AKA; emergent R AKA due to acute occlusion of RLE bypass graft         PT and OT 90 minutes of each daily for 6 days per week in addition to rehab nursing and close management of physiatrist.       Impairment of ADL's: Noted to have impaired activity tolerance, impaired balance, impaired strength, impaired weight shifting, and pain, all affecting her ability to safely and independently perform basic ADLs.  Goal for mod I with basic wheelchair-based ADLs with assist for bathing, as well as assist IADLs/heavier activities.     Impairment of mobility:  Noted to have impaired activity tolerance, impaired balance, impaired strength, impaired weight shifting, and pain, all affecting her ability to safely and independently perform basic mobility.  Goal for mod I with basic wheelchair-based mobility.     Impairment of cognition/language/swallow:  Noted to have dysphagia with goals for safe tolerance of least restrictive diet.  However, IP SLP noting did not anticipate further SLP services at ARU, no issues with tolerating regular diet.  Will not consult initially.  If any concern for difficulty tolerating current diet, can consult.     Medical Conditions  New actions/orders/updates for today are in blue.     S/p right AKA 4/1/24, completed 4/9/24 due to critical limb ischemia, acute occlusion of right common femoral artery to mid anterior tibial artery bypass graft   PAD/PVD with hx multiple prior vascular interventions to BLE  Bilateral Charcot-Breonna-Tooth foot deformities  Acute post-op pain  - Wound care: daily dressing changes to right AKA with xeroform and gauze with kerlix to keep wound protected  -  Dehiscence of lateral surgical incision with some increased serosanguinous drainage.  Also with multiple areas with necrotic appearance.  Overall similar to day of ARU admission although with more drainage.  Vascular surgery consulted/assessed on 4/28.  Appreciate assist.  Recommended ongoing daily dressing changes with Xeroform, gauze fluffs, loosely wrapped Kerlix.  DO NOT use ace wrap.  Stockinette can be placed into over-the-shoulder sling to prevent right AKA dressing from falling off during therapies.  Silvadene topical also added per vascular surgery on 4/29, to be applied daily to incision.  Some increased erythema noted at surgical site on 5/2.  Vascular surgery reassessed 5/3 and felt overall appearance not concerning.  Will likely eventually need debridement of anterior thigh but that will be deferred/evaluated at OP follow-up with Dr. Hernandez.  No indication for antibiotics at this time.  Continue current plan of care.  - Also with significant itching at surgical site.  Recently seen by derm for dermatitis as below and recommended to trial zyrtec or claritin for itching.  Started zyrtec 5 mg daily on 4/25.  - NWB RLE  - Continue PTA L foot custom orthotic   - Continue Plavix 75 mg daily (given hx left bypass).  No ongoing need for Xarelto per vascular (no recent DVT or PE)  - Pain management: APAP 500-1000 mg q6h PRN, dilaudid 2 mg q6h PRN, gabapentin 200 mg 3x/week after HD (renally dosed).  Wean opioids as able.   - Continue PT/OT  - Follow up with vascular surgery in 2-4 weeks (~5/28)     Acute blood loss anemia on anemia of chronic disease, hx iron deficiency  Retroperitoneal hematoma  Recent GI bleed (hospitalized 12/2023)  Required intermittent transfusion during this admission (3/30, 3/31, 4/2, 4/6, 4/9, 4/13).  Repeat CT abdomen on 4/20 with stable right retroperitoneal hematoma; no s/o active bleed.  5/9: Hgb stable at 9.7  - Repeat CT abd/pelvis on 5/2 demonstrated decreased small right  retroperitoneal hematoma (iatrogenic)  - Continue epo/venofer with HD per nephrology  - Trend CBC every T/Th/Sat  - Transfuse for Hgb <7     Acute renal failure on HD  Hyponatremia  Normal baseline Cr, though per nephrology, suspect some modest CKD.  LIMA this admission, up to peak Cr 4/22 on 4/3.  Likely BAILEY +/- rhabdo +/- ischemic injury with no signs of recovery and now dialysis dependent (started 4/3/24).  S/p tunneled dialysis catheter placement 4/3/24.  5/13: Cr up to 2.64, Na remains mildly low at 129  - Nephrology consulted, appreciate ongoing assistance  - HD T/Th/Sat at ARU or per nephrology recs.  Plan for OP HD [facility] T/Th/Sat  - S/p diuretic challenge (Lasix 100 mg PO) on 4/29 per nephrology without improved output.  Evidence of fluid overload on CT abd/pelv 5/2 with mod pleural effusions, small volume ascites, diffuse soft tissue anasarca.  Per nephrology, recommended Bumex 4 mg daily on non-dialysis days, started on 5/6.  - Daily renal panel for now per nephrology  - Continue 1L fluid restriction  - Avoid NSAIDs/nephrotoxins, renally dose medications     Bilateral pleural effusion  Noted on CT abdomen 4/20 with moderate b/l pleural effusion (left>right).  Has been intermittently requiring 1-2L supplemental oxygen.  Unclear if related to volume given new renal failure on HD, also baseline COPD.  Repeat CT abd/pelv on 5/2 with moderate pleural effusions  - Monitor respiratory status, supplemental O2 by NC PRN to maintain sats >88%  - Consider thoracentesis if symptomatic or worsening hypoxia      Celiac disease  Colon distension   Loose stools, improving  Nausea/vomiting, improving  Severe malnutrition in context of acute on chronic illness  Pill cam study with [facility] in 3/2024 (due to recent GI bleed), which revealed mild non-erosive red tissue in the duodenum, some atrophic type appearance that could be celiac disease.  This was followed by celiac labs on 3/20/24 (Gliadin ab and tTG IgA  "antibody) which were positive and thus Celiac diagnosis was established and she was recommended for gluten-free diet.  This admission, noted to have colonic distension and circumferential wall thickening of the distal colon and rectum concerning for proctocolitis on CT abdomen.  Also with loose stools, nausea.  Despite this, patient declined gluten-free diet (switched on 4/18).  Noted to have improvement in loose stools and nausea at discharge to ARU per sending team.  However, has persisted with loose stools, intermittent nausea and vomiting.  Repeat cdiff on 4/28 negative.  Suspect symptoms are due to Celiac disease and non-compliance with gluten-free diet.  Patient agreeable to trial of gluten-free diet started on 4/28.  Due to recurrent nausea/emesis, ongoing loose stools, + new rebound/guarding in right abdomen, CT abdomen/pelvis obtained on 5/2 which showed \"persistent wall thickening of the descending and sigmoid colon in addition to the rectum, similar to prior study. This may also be due  to overall third spacing of fluid versus colitis given diarrhea history. Large volume of stool.\"  Loose stools could represent obstructive diarrhea.  Lomotil held, scopolamine stopped.  Started on bowel protocol.  Improvement in abdominal pain, nausea, vomiting.  Ongoing loose stools but improving in consistency per patient, frequency varies and continues to cause incontinence.  AXR with nonobstructive bowel gas pattern, moderate colonic stool burden, overall not significantly changed.  S/p enema on 5/6 with several subsequent loose Bms.  Daily Miralax added 5/7.  Repeat AXR with improvement.  - Continue gluten-free diet  - RD consulted, appreciate assistance  -Will consider aggressive bowel regiment to clear her out, need to coordinate with dialysis given that aggressive bowel preps may affect her electrolytes.  - Continue senokot-S 1-2 tabs BID scheduled  - Continue Miralax daily  - PRN Zofran  - Continue probiotics " BID  - Monitor symptoms  - Follow up with [facility] as outpatient     Abnormal UA  Given ongoing nausea/vomiting and abdominal/pelvic pain, as well as bladder wall thickening on CT, UA obtained to rule out UTI.   Results with large proteinuria, negative nitrites, small hematuria, large leuk esterase, many bacteria.  UC growing >100k colonies candida and 10-50k colonies of E. Coli.  Started on levofloxacin.  - Continue levofloxacin 500 mg every other day (thru 5/12)  - No clear urinary symptoms.     Hypomagnesemia  Replaced mag IV intermittently.  5/13: mag WNL at 1.9  - Continue to trend     Hypocalcemia  5/7: Ca corrects to 8.6 (mildly low) for hypoalbuminemia.  Have reached out to nephrology to ask about resuming supplement but without response.  - Per pharmacy, was on PTA calcium carbonate/vit D PTA (had run out 1 week before admission), not ordered while at hospital.   - Continue to trend     CAD (hx MI x3)  HTN  Hyperlipidemia  Hx Takotsubo CM  Hx SVT  Last coronary angiogram completed 10/2023.  Recovered EF (55-60%) from ECHO 11/2023.  - Continue PTA Coreg 6.25 mg BID  - Continue amlodipine 10 mg daily (increased 5/8 per nephrology)  - Continue Bumex 4 mg on non-dialysis days (added 5/6 per nephrology)  - Hold PTA lisinopril 10 mg daily due to renal failure  - PTA Jardiance 10 mg daily discontinued due to renal failure  - Rosuvastatin 10 mg daily resumed on ARU admission (previously held due to concern for rhabdo).  5/7: repeat hepatic panel WNL (with exception of low total protein)    - Monitor BP  - Was to follow up with cardiology in Feb 2024, should be scheduled after discharge     Murmur  Noted on exam this admission by attending physiatrist.  Per chart review, intermittently documented in the past with questionable/subtle murmur (though not by cardiology).  Most recent echo 11/7/23 with trace mitral regurg, trace tricuspid regurg, and moderate trileaflet aortic sclerosis.  - Folllow up with cardiology as  above     Hx bilateral carotid artery stenosis s/p bilateral carotid endarterectomies  - Follow up with vascular surgery for annual carotid duplex (last done on 1/4/24 with stable stenosis of right carotid bulb)     Unclear hx Diabetes mellitus, type II  Listed as diagnosis per chart review.  However, family reports that patient has never been diagnosed with diabetes and no A1c that is available in her chart reflects diabetic range.  A1c this admission 5.2%.  PTA Jardiance and gabapentin discontinued due to acute renal failure.  BG well-controlled during this admission without need for insulin.  - Monitor BG periodically with labs     B-cell lymphoma, stage VALENTIN  Followed by [facility] (Dr. Barrow).  Mild radiographic progression on CT 1/26/24.  Given asymptomatic, plans at that time for ongoing CT monitoring.  - Monitor for development of any B symptoms  - Trend CBC  - Follow up with oncology, repeat CT as planned in 7/2024     COPD  Tobacco use disorder  PTA smoking ~5 cigarettes per day  - Monitor respiratory status, supplemental O2 by NC PRN to maintain sats >88%  - Continue PTA Breo Ellipta  - Nicotine patch 14 mcg/day  - Ongoing education/resources for cessation     Adjustment disorder with mixed mood  Hx MDD/anxiety (per chart though patient denies)  Delirium, resolved  Not on medications PTA.  Patient denies any hx depression/anxiety but does note depressed mood in setting of recent AKA and significant home stressors.  - Psychology and spiritual services consulted, appreciate assist  - Monitor mood     GERD  PTA on omeprazole 20 mg daily  - Continue pepcid 20 mg q48 hours (renally dosed) given allergy to pantoprazole (formulary sub for omeprazole)     Spongiotic dermatitis  Recently seen by OP dermatology, recommended betamethasone cream BID PRN, not using regularly at hospital  - Consider resumption of topical steroids if recurrent symptoms  - Started Zyrtec 4/25 as above for itching near surgical site      Sacral wound: pressure injury (stage 2 vs 3), incontinence-associated dermatitis, moisture-associated skin damage  Assessed by [facility]OCN on 4/22 at [facility] hospital.  - WOCN consulted for ongoing follow-up at ARU  - Wound care per WOCN orders  - Pressure reduction strategies     Endometrial thickening  Noted incidentally on CT abdomen/pelvis.  Patient denies postmenopausal bleeding.  - PCP to follow-up, consider outpatient pelvic ultrasound to better assess     Adjustment to disability:  Clinical psychology to eval and treat if indicated  FEN: gluten free diet, 1200 mL fluid restriction  Bowel: incontinent, recent loose stools as above  Bladder: incontinent  DVT Prophylaxis: subcutaneous heparin  GI Prophylaxis: pepcid  Code: full, confirmed on admission  Disposition: now targeting TCU given inadequate supports in home environment and ongoing need for assist  ELOS: pending TCU acceptance/availability  Follow up Appointments on Discharge: PCP in 1-2 weeks, vascular surgery in 2 weeks, [facility], nephrology (ongoing HD), heme/onc ([facility], 7/2024)      MYRTLE Pimentel MD attest to spending over 35 minutes in completion of this document, reviewing chart, seeing and examining patient.

## 2024-05-13 NOTE — PROGRESS NOTES
Vascular Surgery Progress Note    Patient's chart reviewed, wound on L AKA appears to be worsening and necrotic, requires debridement. Will work to schedule debridement procedure in coming days/week, timing TBD. We will plan to do procedure at [facility], will reach out to  and team at Rehab when time for debridement is formally scheduled to facilitate transportation to [facility], will plan for admission after procedure.     Please do not hesitate to reach out with questions.    Bj Lo MD  Vascular Surgery PGY3  To reach vascular surgery [facility] team on call, please go to [facility] and from the drop down find the following:   VASCULAR SURGERY/[facility]  Then page the person listed to the right of day/night call. Can click the pager to page.

## 2024-05-13 NOTE — PLAN OF CARE
"Discharge Plan: home with HH PT vs TCU     Precautions: fall, NWB of RLE, sacral wound, elevate RLE in bed    Current Status:  Bed Mobility: Humaira   Transfer: Humaira slideboard bed<>w/c, Siddharth for commode.   Gait: unable, unsafe  Stairs: not tested  Balance: Stable dynamic sitting.     Assessment: Pt verbalizing improved understanding and execution of head/hips relationship w/ transfers and functional mobility. -60 in PM d/t refusal/\"having a meltdown\".     Other Barriers to Discharge (DME, Family Training, etc):   DME: K3 w/c (delivery scheduled for 5/10), slideboard and ramp (emailed handouts to sister)    Family training: Completed w/ brother on 5/10.         "

## 2024-05-13 NOTE — PLAN OF CARE
Goal Outcome Evaluation:    Overall Patient Progress: no change    Outcome Evaluation: No change in pt progress this shift    Pt is A/O x4. No impulsive behavior overnight, call appropriately to make needs known. Denied pain, SOB, chest pain, or new symptoms. Incontinent B/B, LBM 5/13. Minimal urine output due to dialysis. Transfers Mod-I with slide board to bed <> w/c and SBA x1 with slide board w/c <> commode. Right CVC is WDL. Right AKA dressing C/D/I. Nicotine patch to right shoulder. Pt appeared asleep between cares. Fall precautions in place, call light in reach, bed alarm on. Plan of care ongoing.

## 2024-05-14 ENCOUNTER — APPOINTMENT (OUTPATIENT)
Dept: GENERAL RADIOLOGY | Facility: CLINIC | Age: 64
End: 2024-05-14
Attending: PHYSICIAN ASSISTANT
Payer: COMMERCIAL

## 2024-05-14 ENCOUNTER — APPOINTMENT (OUTPATIENT)
Dept: PHYSICAL THERAPY | Facility: CLINIC | Age: 64
DRG: 559 | End: 2024-05-14
Attending: PHYSICAL MEDICINE & REHABILITATION
Payer: COMMERCIAL

## 2024-05-14 ENCOUNTER — APPOINTMENT (OUTPATIENT)
Dept: OCCUPATIONAL THERAPY | Facility: CLINIC | Age: 64
DRG: 559 | End: 2024-05-14
Attending: PHYSICAL MEDICINE & REHABILITATION
Payer: COMMERCIAL

## 2024-05-14 LAB
ALBUMIN SERPL BCG-MCNC: 2 G/DL (ref 3.5–5.2)
ALP SERPL-CCNC: 82 U/L (ref 40–150)
ALT SERPL W P-5'-P-CCNC: 7 U/L (ref 0–50)
ANION GAP SERPL CALCULATED.3IONS-SCNC: 7 MMOL/L (ref 7–15)
AST SERPL W P-5'-P-CCNC: 16 U/L (ref 0–45)
BILIRUB DIRECT SERPL-MCNC: <0.2 MG/DL (ref 0–0.3)
BILIRUB SERPL-MCNC: 0.3 MG/DL
BUN SERPL-MCNC: 22.5 MG/DL (ref 8–23)
CALCIUM SERPL-MCNC: 7.5 MG/DL (ref 8.8–10.2)
CHLORIDE SERPL-SCNC: 96 MMOL/L (ref 98–107)
CREAT SERPL-MCNC: 3.18 MG/DL (ref 0.51–0.95)
DEPRECATED HCO3 PLAS-SCNC: 25 MMOL/L (ref 22–29)
EGFRCR SERPLBLD CKD-EPI 2021: 16 ML/MIN/1.73M2
ERYTHROCYTE [DISTWIDTH] IN BLOOD BY AUTOMATED COUNT: 17.6 % (ref 10–15)
GLUCOSE SERPL-MCNC: 90 MG/DL (ref 70–99)
HCT VFR BLD AUTO: 32.4 % (ref 35–47)
HGB BLD-MCNC: 10.2 G/DL (ref 11.7–15.7)
MAGNESIUM SERPL-MCNC: 1.8 MG/DL (ref 1.7–2.3)
MCH RBC QN AUTO: 30.2 PG (ref 26.5–33)
MCHC RBC AUTO-ENTMCNC: 31.5 G/DL (ref 31.5–36.5)
MCV RBC AUTO: 96 FL (ref 78–100)
PHOSPHATE SERPL-MCNC: 3.7 MG/DL (ref 2.5–4.5)
PLATELET # BLD AUTO: 184 10E3/UL (ref 150–450)
POTASSIUM SERPL-SCNC: 4.4 MMOL/L (ref 3.4–5.3)
PROT SERPL-MCNC: 4.3 G/DL (ref 6.4–8.3)
RBC # BLD AUTO: 3.38 10E6/UL (ref 3.8–5.2)
SODIUM SERPL-SCNC: 128 MMOL/L (ref 135–145)
WBC # BLD AUTO: 4 10E3/UL (ref 4–11)

## 2024-05-14 PROCEDURE — 128N000003 HC R&B REHAB

## 2024-05-14 PROCEDURE — 999N000125 HC STATISTIC PATIENT MED CONFERENCE < 30 MIN: Performed by: STUDENT IN AN ORGANIZED HEALTH CARE EDUCATION/TRAINING PROGRAM

## 2024-05-14 PROCEDURE — 250N000013 HC RX MED GY IP 250 OP 250 PS 637: Performed by: PHYSICAL MEDICINE & REHABILITATION

## 2024-05-14 PROCEDURE — 634N000001 HC RX 634: Mod: JZ | Performed by: STUDENT IN AN ORGANIZED HEALTH CARE EDUCATION/TRAINING PROGRAM

## 2024-05-14 PROCEDURE — 85048 AUTOMATED LEUKOCYTE COUNT: CPT | Performed by: PHYSICIAN ASSISTANT

## 2024-05-14 PROCEDURE — 97110 THERAPEUTIC EXERCISES: CPT | Mod: GP

## 2024-05-14 PROCEDURE — 90935 HEMODIALYSIS ONE EVALUATION: CPT

## 2024-05-14 PROCEDURE — 99233 SBSQ HOSP IP/OBS HIGH 50: CPT | Mod: 24 | Performed by: STUDENT IN AN ORGANIZED HEALTH CARE EDUCATION/TRAINING PROGRAM

## 2024-05-14 PROCEDURE — 999N000150 HC STATISTIC PT MED CONFERENCE < 30 MIN

## 2024-05-14 PROCEDURE — 74018 RADEX ABDOMEN 1 VIEW: CPT

## 2024-05-14 PROCEDURE — 36415 COLL VENOUS BLD VENIPUNCTURE: CPT | Performed by: PHYSICIAN ASSISTANT

## 2024-05-14 PROCEDURE — 83735 ASSAY OF MAGNESIUM: CPT | Performed by: PHYSICIAN ASSISTANT

## 2024-05-14 PROCEDURE — 250N000013 HC RX MED GY IP 250 OP 250 PS 637

## 2024-05-14 PROCEDURE — 250N000011 HC RX IP 250 OP 636: Performed by: PHYSICIAN ASSISTANT

## 2024-05-14 PROCEDURE — 258N000003 HC RX IP 258 OP 636: Performed by: STUDENT IN AN ORGANIZED HEALTH CARE EDUCATION/TRAINING PROGRAM

## 2024-05-14 PROCEDURE — 84100 ASSAY OF PHOSPHORUS: CPT | Performed by: PHYSICIAN ASSISTANT

## 2024-05-14 PROCEDURE — 82248 BILIRUBIN DIRECT: CPT | Performed by: PHYSICIAN ASSISTANT

## 2024-05-14 PROCEDURE — 74018 RADEX ABDOMEN 1 VIEW: CPT | Mod: 26 | Performed by: RADIOLOGY

## 2024-05-14 PROCEDURE — 97535 SELF CARE MNGMENT TRAINING: CPT | Mod: GO | Performed by: STUDENT IN AN ORGANIZED HEALTH CARE EDUCATION/TRAINING PROGRAM

## 2024-05-14 PROCEDURE — 250N000013 HC RX MED GY IP 250 OP 250 PS 637: Performed by: PHYSICIAN ASSISTANT

## 2024-05-14 PROCEDURE — 82040 ASSAY OF SERUM ALBUMIN: CPT | Performed by: PHYSICIAN ASSISTANT

## 2024-05-14 PROCEDURE — 97110 THERAPEUTIC EXERCISES: CPT | Mod: GO | Performed by: STUDENT IN AN ORGANIZED HEALTH CARE EDUCATION/TRAINING PROGRAM

## 2024-05-14 PROCEDURE — 82247 BILIRUBIN TOTAL: CPT | Performed by: PHYSICIAN ASSISTANT

## 2024-05-14 PROCEDURE — 99232 SBSQ HOSP IP/OBS MODERATE 35: CPT | Mod: FS | Performed by: PHYSICIAN ASSISTANT

## 2024-05-14 RX ORDER — AMOXICILLIN 250 MG
1 CAPSULE ORAL ONCE
Status: COMPLETED | OUTPATIENT
Start: 2024-05-14 | End: 2024-05-14

## 2024-05-14 RX ORDER — BISACODYL 10 MG
10 SUPPOSITORY, RECTAL RECTAL DAILY PRN
Status: DISCONTINUED | OUTPATIENT
Start: 2024-05-15 | End: 2024-05-15 | Stop reason: HOSPADM

## 2024-05-14 RX ORDER — HYDRALAZINE HYDROCHLORIDE 10 MG/1
10 TABLET, FILM COATED ORAL 4 TIMES DAILY PRN
Status: DISCONTINUED | OUTPATIENT
Start: 2024-05-14 | End: 2024-05-15 | Stop reason: HOSPADM

## 2024-05-14 RX ORDER — AMOXICILLIN 250 MG
1 CAPSULE ORAL ONCE
Status: DISCONTINUED | OUTPATIENT
Start: 2024-05-14 | End: 2024-05-14

## 2024-05-14 RX ADMIN — SENNOSIDES AND DOCUSATE SODIUM 1 TABLET: 8.6; 5 TABLET ORAL at 10:30

## 2024-05-14 RX ADMIN — NICOTINE 1 PATCH: 14 PATCH, EXTENDED RELEASE TRANSDERMAL at 10:34

## 2024-05-14 RX ADMIN — SODIUM CHLORIDE 300 ML: 9 INJECTION, SOLUTION INTRAVENOUS at 11:58

## 2024-05-14 RX ADMIN — ONDANSETRON 4 MG: 4 TABLET, ORALLY DISINTEGRATING ORAL at 20:58

## 2024-05-14 RX ADMIN — SENNOSIDES AND DOCUSATE SODIUM 1 TABLET: 8.6; 5 TABLET ORAL at 20:45

## 2024-05-14 RX ADMIN — FLUTICASONE FUROATE AND VILANTEROL TRIFENATATE 1 PUFF: 200; 25 POWDER RESPIRATORY (INHALATION) at 10:29

## 2024-05-14 RX ADMIN — Medication 1 CAPSULE: at 10:33

## 2024-05-14 RX ADMIN — HEPARIN SODIUM 5000 UNITS: 5000 INJECTION, SOLUTION INTRAVENOUS; SUBCUTANEOUS at 20:45

## 2024-05-14 RX ADMIN — HEPARIN SODIUM 5000 UNITS: 5000 INJECTION, SOLUTION INTRAVENOUS; SUBCUTANEOUS at 10:30

## 2024-05-14 RX ADMIN — AMLODIPINE BESYLATE 10 MG: 10 TABLET ORAL at 10:33

## 2024-05-14 RX ADMIN — ROSUVASTATIN CALCIUM 10 MG: 5 TABLET, COATED ORAL at 20:45

## 2024-05-14 RX ADMIN — SILVER SULFADIAZINE: 10 CREAM TOPICAL at 10:35

## 2024-05-14 RX ADMIN — CLOPIDOGREL BISULFATE 75 MG: 75 TABLET ORAL at 10:32

## 2024-05-14 RX ADMIN — EPOETIN ALFA-EPBX 10000 UNITS: 10000 INJECTION, SOLUTION INTRAVENOUS; SUBCUTANEOUS at 11:57

## 2024-05-14 RX ADMIN — CARVEDILOL 6.25 MG: 6.25 TABLET, FILM COATED ORAL at 16:13

## 2024-05-14 RX ADMIN — POLYETHYLENE GLYCOL 3350 17 G: 17 POWDER, FOR SOLUTION ORAL at 10:29

## 2024-05-14 RX ADMIN — SODIUM CHLORIDE 250 ML: 9 INJECTION, SOLUTION INTRAVENOUS at 11:58

## 2024-05-14 RX ADMIN — CETIRIZINE HYDROCHLORIDE 5 MG: 5 TABLET ORAL at 10:31

## 2024-05-14 RX ADMIN — MICONAZOLE NITRATE: 20 POWDER TOPICAL at 10:34

## 2024-05-14 RX ADMIN — SENNOSIDES AND DOCUSATE SODIUM 1 TABLET: 8.6; 5 TABLET ORAL at 21:54

## 2024-05-14 RX ADMIN — ACETAMINOPHEN 1000 MG: 500 TABLET ORAL at 20:44

## 2024-05-14 RX ADMIN — MICONAZOLE NITRATE: 20 POWDER TOPICAL at 20:54

## 2024-05-14 RX ADMIN — ACETAMINOPHEN 1000 MG: 500 TABLET ORAL at 08:55

## 2024-05-14 RX ADMIN — GABAPENTIN 200 MG: 100 CAPSULE ORAL at 20:52

## 2024-05-14 RX ADMIN — Medication 1 CAPSULE: at 20:44

## 2024-05-14 RX ADMIN — FAMOTIDINE 20 MG: 20 TABLET ORAL at 16:13

## 2024-05-14 RX ADMIN — CARVEDILOL 6.25 MG: 6.25 TABLET, FILM COATED ORAL at 10:32

## 2024-05-14 ASSESSMENT — ACTIVITIES OF DAILY LIVING (ADL)
ADLS_ACUITY_SCORE: 36
ADLS_ACUITY_SCORE: 36
ADLS_ACUITY_SCORE: 35
ADLS_ACUITY_SCORE: 36
ADLS_ACUITY_SCORE: 35
ADLS_ACUITY_SCORE: 36
ADLS_ACUITY_SCORE: 36
ADLS_ACUITY_SCORE: 35
ADLS_ACUITY_SCORE: 35
ADLS_ACUITY_SCORE: 36
ADLS_ACUITY_SCORE: 36

## 2024-05-14 NOTE — PROGRESS NOTES
Team rounds today. Vascular surgery met with pt yesterday, pt will go back to [facility] for debridement, procedure date TBD. Will continue to pursue TCU placement. SW provided a brief TCU update to pt's sister, Yue, during rounds.    Pending:  [facility] (PH: [phone])  5/9: referral sent  5/10: Resent per request  5/13: SW spoke with Maggie in admissions. No female beds today or tomorrow. SW will follow up on Wednesday if bed is still needed     [facility] (PH: [phone])  5/9: referral sent  5/10: LVM  5/13: SW spoke with Diane in admissions, referral is still being reviewed.  5/14: DON needs to review referral. Considering.    Declined  [facility] - no bed available  [facility] - unable to meet pt's needs  [facility] - due to acuity, not enough staffing to meet pt's needs  [facility] - unable to meet pt's needs  [facility] - does not have a dialysis contract despite pt being set up with OP HD  [facility]) - unable to meet pt's needs, hx of tobacco use    [facility]  Post Acute Float   ARU/TCU/LTACH    Phone: [phone]  Fax: [phone]

## 2024-05-14 NOTE — PROGRESS NOTES
CLINICAL NUTRITION SERVICES - REASSESSMENT NOTE     Nutrition Prescription    RECOMMENDATIONS FOR MDs/PROVIDERS TO ORDER:  Appreciate continued encouragement surrounding PO intake    Malnutrition Status:    Severe malnutrition in the context of acute illness.     Recommendations already ordered by Registered Dietitian (RD):  Continue snacks/supplements PRN    Future/Additional Recommendations:  - Monitor PO intake, labs, and weight trends  - Continue to encourage adequate intakes and offer snacks/supplements     EVALUATION OF THE PROGRESS TOWARD GOALS   Diet: Gluten Free and 1200 mL Fluid Restriction  - Room service with assist    Snacks/supplements: PRN    Intake: % per flowsheets over past week     Per [employer], pt ordering 3 meals/day from room service. Ordered 3-day average of 1355 kcal and 73 g protein.      NEW FINDINGS   - Per chart review/discussion in rounds, pt will go back to OR for debridement. Wound on L AKA appears to be worsening and necrotic.   - Pt out of room at dialysis. Chart review only.  - Pt was requesting Ensure last week so RD ordered. RD then notified by diet office that pt thought Ensure was making her sick, so she did not want it anymore.     Weight:   05/14/24 0512 57 kg (125 lb 10.6 oz) Bed scale   05/13/24 0500 53.5 kg (117 lb 15.1 oz) Bed scale   05/11/24 0500 55 kg (121 lb 4.1 oz) Bed scale   05/10/24 1518 52.5 kg (115 lb 11.9 oz) Bed scale   05/09/24 1723 44.8 kg (98 lb 12.3 oz) --   05/09/24 1100 46.9 kg (103 lb 6.3 oz) Bed scale   05/07/24 0500 56.7 kg (125 lb) Bed scale   05/06/24 1638 56.4 kg (124 lb 5.4 oz) Bed scale   05/04/24 1100 52.3 kg (115 lb 4.8 oz) Bed scale   05/03/24 0115 50 kg (110 lb 3.7 oz) --   04/28/24 0100 51.8 kg (114 lb 3.2 oz) Bed scale   04/27/24 0400 51.8 kg (114 lb 3.2 oz) Bed scale   04/26/24 2340 51.1 kg (112 lb 10.5 oz) Bed scale   04/25/24 0918 56.1 kg (123 lb 10.9 oz) Bed scale   04/23/24 0808 55.8 kg (123 lb 0.3 oz) Bed scale   04/22/24 1350  59.1 kg (130 lb 4.7 oz) Bed scale   Difficult to assess weight trends with fluid shifts from HD    Labs:   Na: 128 (L)  BUN: 22.5 (WNL)  Cr: 3.18 (H)    Meds:  Bumex, Sun/Mon/Wed/Fri  Pepcid, every 48 hrs  Culturell, BID  Renal MVI  Miralax - not taking consistently  Senna-docusate - not taking consistently  Zofran, PRN - given 5/13    GI: last BM 5/14; loose x2, per I/Os    Renal: LIMA requiring HD. On TTS schedule     Skin: Cesar 17  WOCN following for coccyx    MALNUTRITION  % Intake: </= 50% for >/= 5 days (severe)  % Weight Loss: Unable to assess  Subcutaneous Fat Loss: Facial region: moderate and Upper arm: moderate - per RD note 4/24   Muscle Loss: Facial region: moderate and Upper arm: moderate - per RD note 4/24   Fluid Accumulation/Edema: Trace - Moderate  Malnutrition Diagnosis: Severe malnutrition in the context of acute illness.     Previous Goals   Patient to consume % of nutritionally adequate meal trays TID, or the equivalent with supplements/snacks.   Evaluation: Not met    Previous Nutrition Diagnosis  Inadequate oral intake related to dislike of GF diet and hospital food and increased protein needs as evidenced by chart review/pt report, and est protein needs of 1.3-1.8 g/kg for wound healing and HD.   Evaluation: Modified    CURRENT NUTRITION DIAGNOSIS  Inadequate oral intake related to nausea/vomiting/loose stools, dislike of hospital food and increased protein needs as evidenced by chart review/pt report, and est protein needs of 1.3-1.8 g/kg for wound healing and HD.     INTERVENTIONS  Implementation  Collaboration with other providers - discussed in team rounds  Medical food supplement therapy - continue PRN    Goals  Patient to consume % of nutritionally adequate meal trays TID, or the equivalent with supplements/snacks.     Monitoring/Evaluation  Progress toward goals will be monitored and evaluated per protocol.     Marleen Mccloud RD, LD  Available via phone and [employer]  Phone:  [phone]  Vocera: 5R Acute Rehab Clinical Dietitian  Weekend/Holiday Vocera: Weekend Holiday Clinical Dietitian [Multi Site Groups]

## 2024-05-14 NOTE — PLAN OF CARE
Goal Outcome Evaluation:      Plan of Care Reviewed With: patient  Overall Patient Progress: no change    Outcome Evaluation: Pt is alert and oriented x 4 , denies pain. mod I with SB to wc, SBA to  Bedside commode . Incontinent of bowel, pt is on hemodialysis.  Pt refused shower, CHG wipes done.New dressing applied to Rt. AKA and coccyx .call light within reach , no new concerns, continue POC.

## 2024-05-14 NOTE — PROGRESS NOTES
"  [facility]   Acute Rehabilitation Unit  Daily progress note    INTERVAL HISTORY  Shirley Hendricks was seen and examined at bedside this morning during team rounds, with sister present by phone.  No acute events reported overnight.  Patient was updated regarding vascular surgery plans for debridement, still waiting on timing which will determine whether she will be readmitted from ARU vs TCU.  Patient notes feeling very emotional yesterday, struggling with her medical course and psychosocial situation.  She reports increased nausea and vomiting over the past 2 days as compared to the days preceding.  On Sunday evening, she reports that she had a full dinner and felt fine, but after taking her medications, she became \"violently ill\" with profuse vomiting.  She still had some nausea yesterday but less than that.  She reports ongoing loose/watery, small, typically incontinent bowel movements, can vary between 3-5 per day.  She denies abdominal pain though it does feel tender to the touch especially on the right.  She has noted some increase in right leg pain over the past few days as well.  States she is \"not happy with her stump\" and expresses some concern about increased redness and poor healing.  She is feeling overwhelmed by ongoing GI issues despite feeling like she is adhering strictly to her gluten-free diet.  Her sister notes some concerns with impaired memory, which patient endorses as well.  Discussed plans for ongoing/further evaluation by home/outpatient therapies if ongoing.  She denies other questions or concerns at this time.    With therapies, she is getting gradually stronger but toileting remains most difficult due to urgency and incontinence.  She is using slide board to/from wheelchair.  For full functional updates, see team rounds note from today.      MEDICATIONS  Current Facility-Administered Medications   Medication Dose Route Frequency Provider Last " Rate Last Admin    amLODIPine (NORVASC) tablet 10 mg  10 mg Oral Daily Corrina Hurt PA-C   10 mg at 05/13/24 1004    bumetanide (BUMEX) tablet 4 mg  4 mg Oral Once per day on Sunday Monday Wednesday Friday Corrina Hurt PA-C   4 mg at 05/13/24 1010    carvedilol (COREG) tablet 6.25 mg  6.25 mg Oral BID w/meals Corrina Hurt PA-C   6.25 mg at 05/13/24 1756    cetirizine (zyrTEC) tablet 5 mg  5 mg Oral Daily Corrina Hurt PA-C   5 mg at 05/13/24 1004    clopidogrel (PLAVIX) tablet 75 mg  75 mg Oral Daily Corrina Hurt PA-C   75 mg at 05/13/24 1005    famotidine (PEPCID) tablet 20 mg  20 mg Oral Q48H Corrina Hurt PA-C   20 mg at 05/12/24 1638    fluticasone-vilanterol (BREO ELLIPTA) 200-25 MCG/ACT inhaler 1 puff  1 puff Inhalation Daily Corrina Hurt PA-C   1 puff at 05/13/24 1008    gabapentin (NEURONTIN) capsule 200 mg  200 mg Oral Once per day on Tuesday Thursday Saturday Corrina Hurt PA-C   200 mg at 05/11/24 2154    heparin ANTICOAGULANT injection 5,000 Units  5,000 Units Subcutaneous Q12H Corrina Hurt PA-C   5,000 Units at 05/13/24 2138    lactobacillus rhamnosus (GG) (CULTURELL) capsule 1 capsule  1 capsule Oral BID Husam Coulter MD   1 capsule at 05/13/24 2137    miconazole (MICATIN) 2 % powder   Topical BID Corrina Hurt PA-C   Given at 05/13/24 2138    multivitamin RENAL (TRIPHROCAPS) capsule 1 capsule  1 capsule Oral Daily Corrina Hurt PA-C   1 capsule at 05/12/24 1227    nicotine (NICODERM CQ) 14 MG/24HR 24 hr patch 1 patch  1 patch Transdermal Daily Corrina Hurt PA-C   1 patch at 05/13/24 1004    polyethylene glycol (MIRALAX) Packet 17 g  17 g Oral Daily Corrina Hurt PA-C   17 g at 05/10/24 1104    rosuvastatin (CRESTOR) tablet 10 mg  10 mg Oral At Bedtime Corrina Hurt PA-C   10 mg at 05/13/24 2137    senna-docusate (SENOKOT-S/PERICOLACE) 8.6-50 MG per tablet 1-2 tablet  1-2 tablet Oral BID  "Corrina Hurt PA-C   1 tablet at 05/13/24 2139    silver sulfADIAZINE (SILVADENE) 1 % cream   Topical Daily Gala Lo MD   Given at 05/13/24 1007          Current Facility-Administered Medications   Medication Dose Route Frequency Provider Last Rate Last Admin    acetaminophen (TYLENOL) tablet 500-1,000 mg  500-1,000 mg Oral Q6H PRN Corrina Hurt PA-C   1,000 mg at 05/13/24 2148    [Held by provider] diphenoxylate-atropine (LOMOTIL) 2.5-0.025 MG per tablet 1 tablet  1 tablet Oral 4x Daily PRN Corrina Hurt PA-C   1 tablet at 04/30/24 1538    HYDROmorphone (DILAUDID) tablet 2 mg  2 mg Oral Q6H PRN Corrina Hurt PA-C   2 mg at 05/07/24 2134    naloxone (NARCAN) injection 0.2 mg  0.2 mg Intravenous Q2 Min PRN Arun Pimentel MD        Or    naloxone (NARCAN) injection 0.4 mg  0.4 mg Intravenous Q2 Min PRN Arun Pimentel MD        Or    naloxone (NARCAN) injection 0.2 mg  0.2 mg Intramuscular Q2 Min PRN Arun Pimentel MD        Or    naloxone (NARCAN) injection 0.4 mg  0.4 mg Intramuscular Q2 Min PRN Arun Pimentel MD        nitroGLYcerin (NITROSTAT) sublingual tablet 0.4 mg  0.4 mg Sublingual Q5 Min PRN Corrina Hurt PA-C        ondansetron (ZOFRAN ODT) ODT tab 4 mg  4 mg Oral Q6H PRN Corrina Hurt PA-C   4 mg at 05/13/24 1056    polyethylene glycol (MIRALAX) powder 17 g  17 g Oral Daily PRN Corrina Hurt PA-C            PHYSICAL EXAM  BP (!) 148/72 (BP Location: Left arm, Patient Position: Semi-Myers's, Cuff Size: Adult Regular)   Pulse 68   Temp 98.8  F (37.1  C) (Oral)   Resp 16   Ht 1.549 m (5' 0.98\")   Wt 57 kg (125 lb 10.6 oz)   LMP  (LMP Unknown)   SpO2 95%   BMI 23.76 kg/m    Gen: NAD, laying in bed  HEENT: NC/AT, MMM, EOMI, PERRLA  Cardio: RRR, no M/R  Pulm: non-labored on room air, lungs CTA bilaterally  Abd: soft, +tender with deep palpation of R abdomen (both upper and lower quadrants) and some epigastric tenderness as well, non-distended, active bowel " sounds  Ext: some edema in RLE; charcot foot deformity on left; R AKA (incision not visualized on this date)  Neuro/MSK: Awake, alert, moving all extremities actively in bed  *Full exam deferred today for conversation      LABS  CBC RESULTS:   Recent Labs   Lab Test 05/09/24  0655 05/07/24  0553 05/02/24  0551   WBC 3.2* 3.3* 3.6*   RBC 3.20* 3.13* 2.80*   HGB 9.7* 9.4* 8.7*   HCT 30.8* 29.4* 26.9*   MCV 96 94 96   MCH 30.3 30.0 31.1   MCHC 31.5 32.0 32.3   RDW 17.6* 17.5* 18.4*    202 223       Last Basic Metabolic Panel:  Recent Labs   Lab Test 05/13/24  0706 05/12/24  0554 05/11/24  1951 05/11/24  0552   * 132*  --  130*   POTASSIUM 4.3 3.8  --  4.5   CHLORIDE 96* 99  --  97*   CO2 25 25  --  25   ANIONGAP 8 8  --  8   GLC 68* 75 131* 69*   BUN 14.2 8.2  --  16.8   CR 2.64* 2.07*  --  3.14*   GFRESTIMATED 19* 26*  --  16*   SONIA 7.4* 7.5*  --  7.1*         Rehabilitation - continue comprehensive acute inpatient rehabilitation program with multidisciplinary approach including therapies, rehab nursing, and physiatry following. See interval history for updates.      ASSESSMENT AND PLAN  Shirley Hendricks is a 63 year old right hand dominant female with complicated past medical history including but not limited to PAD with multiple prior bilateral lower extremity vascular bypass grafts and thrombectomies (most recently 10/2023 right common femoral to proximal anterior tibial PTFE bypass graft), bilateral Charcot-Breonna-Tooth foot deformity, prior bilateral carotid endarterectomies, B-cell lymphoma, CAD (hx Mix3), hypertension, hyperlipidemia, GI bleed (12/2023), type 2 diabetes mellitus, COPD, tobacco use disorder, iron deficiency anemia, GERD, Celiac disease, Takotsubo cardiomyopathy, SVT, depression/anxiety and spongiotic dermatitis who was admitted on 3/29/24 with acute occlusion of right lower extremity arterial bypass graft now s/p right above-knee amputation 4/1/24, completed 4/9/24 with hospital  course complicated by acute renal failure now on dialysis, sepsis, acute blood loss anemia, acute post-operative pain, nausea, loose stools, delirium, malnutrition, and multiple electrolyte derangements.  She is now admitted to ARU on 4/22/24 for multidisciplinary rehabilitation and ongoing medical management.        Admission to acute inpatient rehab 04/22/24.    Impairment group code: Amputation 05.3 Unilateral LE AKA; emergent R AKA due to acute occlusion of RLE bypass graft         PT and OT 90 minutes of each daily for 6 days per week in addition to rehab nursing and close management of physiatrist.       Impairment of ADL's: Noted to have impaired activity tolerance, impaired balance, impaired strength, impaired weight shifting, and pain, all affecting her ability to safely and independently perform basic ADLs.  Goal for mod I with basic wheelchair-based ADLs with assist for bathing, as well as assist IADLs/heavier activities.     Impairment of mobility:  Noted to have impaired activity tolerance, impaired balance, impaired strength, impaired weight shifting, and pain, all affecting her ability to safely and independently perform basic mobility.  Goal for mod I with basic wheelchair-based mobility.     Impairment of cognition/language/swallow:  Noted to have dysphagia with goals for safe tolerance of least restrictive diet.  However, IP SLP noting did not anticipate further SLP services at ARU, no issues with tolerating regular diet.  Will not consult initially.  If any concern for difficulty tolerating current diet, can consult.     Medical Conditions  New actions/orders/updates for today are in blue.     S/p right AKA 4/1/24, completed 4/9/24 due to critical limb ischemia, acute occlusion of right common femoral artery to mid anterior tibial artery bypass graft   PAD/PVD with hx multiple prior vascular interventions to BLE  Bilateral Charcot-Breonna-Tooth foot deformities  Acute post-op pain  - Wound care:  daily dressing changes to right AKA with xeroform and gauze with kerlix to keep wound protected  - Dehiscence of lateral surgical incision with some increased serosanguinous drainage.  Also with multiple areas with necrotic appearance.  Overall similar to day of ARU admission although with more drainage.  Vascular surgery consulted/assessed on 4/28.  Appreciate assist.  Recommended ongoing daily dressing changes with Xeroform, gauze fluffs, loosely wrapped Kerlix.  DO NOT use ace wrap.  Stockinette can be placed into over-the-shoulder sling to prevent right AKA dressing from falling off during therapies.  Silvadene topical also added per vascular surgery on 4/29, to be applied daily to incision.  Some increased erythema noted at surgical site on 5/2.  Vascular surgery reassessed 5/3 and felt overall appearance not concerning.  Per vascular surgery review on 5/13, feel will require debridement and wound vac [facility] at [facility] in coming days/week, working on scheduling.  Depending on timing, may be directly discharged to OR and re-admitted afterwards, then to TCU or vice versa.  - Also with significant itching at surgical site.  Recently seen by derm for dermatitis as below and recommended to trial zyrtec or claritin for itching.  Started zyrtec 5 mg daily on 4/25.  - NWB RLE  - Continue PTA L foot custom orthotic   - Continue Plavix 75 mg daily (given hx left bypass).  No ongoing need for Xarelto per vascular (no recent DVT or PE)  - Pain management: APAP 500-1000 mg q6h PRN, gabapentin 200 mg 3x/week after HD (renally dosed).  Has not taken dilaudid over last 6 days, will discontinue.  - Continue PT/OT  - Follow up with vascular surgery in 2-4 weeks (~5/28)     Acute blood loss anemia on anemia of chronic disease, hx iron deficiency  Retroperitoneal hematoma  Recent GI bleed (hospitalized 12/2023)  Required intermittent transfusion during this admission (3/30, 3/31, 4/2, 4/6, 4/9, 4/13).  Repeat CT abdomen on  4/20 with stable right retroperitoneal hematoma; no s/o active bleed.  5/14: Hgb stable at 10.2  - Repeat CT abd/pelvis on 5/2 demonstrated decreased small right retroperitoneal hematoma (iatrogenic)  - Continue epo/venofer with HD per nephrology  - Trend CBC every T/Th/Sat  - Transfuse for Hgb <7     Acute renal failure on HD  Hyponatremia  Normal baseline Cr, though per nephrology, suspect some modest CKD.  LIMA this admission, up to peak Cr 4/22 on 4/3.  Likely BAILYE +/- rhabdo +/- ischemic injury with no signs of recovery and now dialysis dependent (started 4/3/24).  S/p tunneled dialysis catheter placement 4/3/24.  5/14: Cr up to 3.18, Na drifting down at 128.  HD today  - Nephrology consulted, appreciate ongoing assistance  - HD T/Th/Sat at ARU or per nephrology recs.  Plan for OP HD at [facility] T/Th/Sat  - S/p diuretic challenge (Lasix 100 mg PO) on 4/29 per nephrology without improved output.  Evidence of fluid overload on CT abd/pelv 5/2 with mod pleural effusions, small volume ascites, diffuse soft tissue anasarca.  Per nephrology, recommended Bumex 4 mg daily on non-dialysis days, started on 5/6.  - Daily renal panel for now per nephrology  - Continue 1.2L fluid restriction  - Avoid NSAIDs/nephrotoxins, renally dose medications     Bilateral pleural effusion  Noted on CT abdomen 4/20 with moderate b/l pleural effusion (left>right).  Has been intermittently requiring 1-2L supplemental oxygen.  Unclear if related to volume given new renal failure on HD, also baseline COPD.  Repeat CT abd/pelv on 5/2 with moderate pleural effusions  - Monitor respiratory status, supplemental O2 by NC PRN to maintain sats >88%  - Consider thoracentesis if symptomatic or worsening hypoxia      Celiac disease  Colon distension   Loose stools, improving  Nausea/vomiting, improving  Severe malnutrition in context of acute on chronic illness  Pill cam study with [facility] in 3/2024 (due to recent GI bleed), which revealed mild  "non-erosive red tissue in the duodenum, some atrophic type appearance that could be celiac disease.  This was followed by celiac labs on 3/20/24 (Gliadin ab and tTG IgA antibody) which were positive and thus Celiac diagnosis was established and she was recommended for gluten-free diet.  This admission, noted to have colonic distension and circumferential wall thickening of the distal colon and rectum concerning for proctocolitis on CT abdomen.  Also with loose stools, nausea.  Despite this, patient declined gluten-free diet (switched on 4/18).  Noted to have improvement in loose stools and nausea at discharge to ARU per sending team.  However, has persisted with loose stools, intermittent nausea and vomiting.  Repeat cdiff on 4/28 negative.  Suspect symptoms are due to Celiac disease and non-compliance with gluten-free diet.  Patient agreeable to trial of gluten-free diet started on 4/28.  Due to recurrent nausea/emesis, ongoing loose stools, + new rebound/guarding in right abdomen, CT abdomen/pelvis obtained on 5/2 which showed \"persistent wall thickening of the descending and sigmoid colon in addition to the rectum, similar to prior study. This may also be due  to overall third spacing of fluid versus colitis given diarrhea history. Large volume of stool.\"  Loose stools could represent obstructive diarrhea.  Lomotil held, scopolamine stopped.  Started on bowel protocol.  Improvement in abdominal pain, nausea, vomiting.  Ongoing loose stools but improving in consistency per patient, frequency varies and continues to cause incontinence.  AXR with nonobstructive bowel gas pattern, moderate colonic stool burden, overall not significantly changed.  S/p enema on 5/6 with several subsequent loose Bms.  Daily Miralax added 5/7.  Repeat AXR with improvement.  - Continue gluten-free diet  - RD consulted, appreciate assistance  - Increased nausea/emesis x2 days, still with 3-5 loose/small/typically incontinent stools per " day.  Will repeat AXR to reassess.  - Continue senokot-S 1-2 tabs BID scheduled.  Has only taken 4 of last 10 doses.  - Continue Miralax daily.  Has only taken 1 of last 5 doses  - PRN Zofran  - Continue probiotics BID  - Monitor symptoms  - Follow up with [facility] as outpatient     Abnormal UA  Given ongoing nausea/vomiting and abdominal/pelvic pain, as well as bladder wall thickening on CT, UA obtained to rule out UTI.   Results with large proteinuria, negative nitrites, small hematuria, large leuk esterase, many bacteria.  UC growing >100k colonies candida and 10-50k colonies of E. Coli.  Started on levofloxacin.  - Continue levofloxacin 500 mg every other day (thru 5/12)  - No clear urinary symptoms.     Hypomagnesemia  Replaced mag IV intermittently.  5/14: mag WNL at 1.8  - Continue to trend     Hypocalcemia  5/7: Ca corrects to 8.6 (mildly low) for hypoalbuminemia.  Have reached out to nephrology to ask about resuming supplement but without response.  - Per pharmacy, was on PTA calcium carbonate/vit D PTA (had run out 1 week before admission), not ordered while at hospital.   - Continue to trend     CAD (hx MI x3)  HTN  Hyperlipidemia  Hx Takotsubo CM  Hx SVT  Last coronary angiogram completed 10/2023.  Recovered EF (55-60%) from ECHO 11/2023.  - Continue PTA Coreg 6.25 mg BID  - Continue amlodipine 10 mg daily (increased 5/8 per nephrology)  - Continue Bumex 4 mg on non-dialysis days (added 5/6 per nephrology)  - Hold PTA lisinopril 10 mg daily due to renal failure  - PTA Jardiance 10 mg daily discontinued due to renal failure  - Rosuvastatin 10 mg daily resumed on ARU admission (previously held due to concern for rhabdo).  5/7: repeat hepatic panel WNL (with exception of low total protein)    - Monitor BP  - Was to follow up with cardiology in Feb 2024, should be scheduled after discharge     Murmur  Noted on exam this admission by attending physiatrist.  Per chart review, intermittently documented in the past  with questionable/subtle murmur (though not by cardiology).  Most recent echo 11/7/23 with trace mitral regurg, trace tricuspid regurg, and moderate trileaflet aortic sclerosis.  - Folllow up with cardiology as above     Hx bilateral carotid artery stenosis s/p bilateral carotid endarterectomies  - Follow up with vascular surgery for annual carotid duplex (last done on 1/4/24 with stable stenosis of right carotid bulb)     Unclear hx Diabetes mellitus, type II  Listed as diagnosis per chart review.  However, family reports that patient has never been diagnosed with diabetes and no A1c that is available in her chart reflects diabetic range.  A1c this admission 5.2%.  PTA Jardiance and gabapentin discontinued due to acute renal failure.  BG well-controlled during this admission without need for insulin.  - Monitor BG periodically with labs     B-cell lymphoma, stage VALENTIN  Followed by [facility] (Dr. Barrow).  Mild radiographic progression on CT 1/26/24.  Given asymptomatic, plans at that time for ongoing CT monitoring.  - Monitor for development of any B symptoms  - Trend CBC  - Follow up with oncology, repeat CT as planned in 7/2024     COPD  Tobacco use disorder  PTA smoking ~5 cigarettes per day  - Monitor respiratory status, supplemental O2 by NC PRN to maintain sats >88%  - Continue PTA Breo Ellipta  - Nicotine patch 14 mcg/day  - Ongoing education/resources for cessation     Adjustment disorder with mixed mood  Hx MDD/anxiety (per chart though patient denies)  Delirium, resolved  Not on medications PTA.  Patient denies any hx depression/anxiety but does note depressed mood in setting of recent AKA and significant home stressors.  - Psychology and spiritual services consulted, appreciate assist  - Monitor mood     GERD  PTA on omeprazole 20 mg daily  - Continue pepcid 20 mg q48 hours (renally dosed) given allergy to pantoprazole (formulary sub for omeprazole)     Spongiotic dermatitis  Recently seen by OP  dermatology, recommended betamethasone cream BID PRN, not using regularly at hospital  - Consider resumption of topical steroids if recurrent symptoms  - Started Zyrtec 4/25 as above for itching near surgical site     Sacral wound: pressure injury (stage 2 vs 3), incontinence-associated dermatitis, moisture-associated skin damage  Assessed by [facility]N on 4/22 at [facility] hospital.  - WOCN consulted for ongoing follow-up at ARU  - Wound care per WOCN orders  - Pressure reduction strategies     Endometrial thickening  Noted incidentally on CT abdomen/pelvis.  Patient denies postmenopausal bleeding.  - PCP to follow-up, consider outpatient pelvic ultrasound to better assess     Adjustment to disability:  Clinical psychology to eval and treat if indicated  FEN: gluten free diet, 1200 mL fluid restriction  Bowel: incontinent, recent loose stools as above  Bladder: incontinent  DVT Prophylaxis: subcutaneous heparin  GI Prophylaxis: pepcid  Code: full, confirmed on admission  Disposition: ultimately may require TCU given inadequate supports in home environment and ongoing need for assist, but now likely will discharge back to acute hospital/OR for procedure  ELOS: now likely will discharge back to acute hospital OR, awaiting scheduling  Follow up Appointments on Discharge: PCP in 1-2 weeks, vascular surgery in 2 weeks, [facility], nephrology (ongoing HD), heme/onc ([facility], 7/2024)      45 minutes spent on the date of service doing chart review, history and exam, documentation, and further activities as noted above.    Patient was seen and discussed with Dr. Arun Pimentel, PM&R staff physician     Corrina Hurt PA-C  Physical Medicine & Rehabilitation
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
## 2024-05-14 NOTE — PLAN OF CARE
Discharge Planner Post-Acute Rehab OT:      Discharge Plan: home with assist and HH OT or TCU     Precautions: fall, NWB of RLE, dialysis, sacral wound, elevate residual limb when in bed     Current Status:  ADLs:  Mobility: mod I for slide board to WC from bed  Grooming: IND seated at sink in w/c  Dressing: UB: IND, LB: IND in supine, IND for brace and shoe  Bathing: TBD  Toileting: SB to commode Ax1, assit for lydia cares and clothing depending on continence  IADLs: Pt does most IADL including caregiving for .   Vision/Cognition: intact     Assessment: Pt declined out of  bed activity due to increased pain in stump. -20 min     Other Barriers to Discharge (DME, Family Training, etc): DME, family training, pt has support from multiple family members and will need to schedule family training since she will most likely still require assist with toileting.     5/4 Pt states her H is staying with his sister and she hopes this will con't.  She reports her brother will help her but she would like to be IND with toileting as he is not likely to assist her with toileting.

## 2024-05-14 NOTE — PROGRESS NOTES
Nephrology Progress Note  05/14/2024         Assessment & Recommendations:   Shirley Hendricks is a 63 year old year old female with peripheral artery disease, coronary artery disease and history of MI, hypertension, diabetes mellitus type 2 who is admitted to ARU following hospitalization for occlusion of right LE bypass graft managed with right above-the-knee amputation and complication of acute kidney injury requiring dialysis.     #Acute kidney injury: multifactorial (use of IV contrast 3 days in a row for evaluation and management of her occluded bypass graft, ischemia and possibly rhabdomyolysis) Creatinine level was 0.7 mg/dL prior to this episode. No urine studies available prior to LIMA to assess baseline proteinuria.  -First dialysis run 4/3/2024.  -Access tunneled CVC initially placed 4/3/2024 and revised on 4/15/2024.  - Continue daily renal panel or BMP to assess signs of recovery of kidney function (creatinine appears to be rising slightly less steeply, though only rising less by hundredths of the decimal point) until she is ready for discharge.  - Continue TTS dialysis schedule while at ARU [unless we see more clear evidence of renal recovery (which we could more thoroughly workup up then with a 24hr urine collection for urine creatinine mass and clearance)].     #Hyponatremia, improving with HD and fluid restriction   - Patient reports adhering to fluid restriction  - hyponatremia partially corrected with dialysis, but will predictably recur until and if her urine output improves signficantly    # hypertension, volume status   Relative bradycardia on carvedilol 6.25 mg twice daily.   On amlodipine 10 mg daily and bumetanide 4 mg on non-dialysis days - continue.    #PAD/PVD  #Right common femoral artery to the mid anterior tibial artery bypass graft acute occlusion  #Right above-the-knee amputation 4/9/2024  - Plavix 75 mg daily (previously on Xarelto but no ongoing indication)     #Anemia due to  "acute blood loss with retroperitoneal hematoma and history of GI bleeding in December 2023.  - retacrit 10K units three times weekly with HD  -Received iron sucrose on 4/30     #Coronary artery disease with history of myocardial infarction x 3  #History of Takotsubo cardiomyopathy     #Diabetes mellitus type 2     #Stage Mark marginal zone B-cell lymphoma  - Undergoing surveillance with [facility]     Recommendations were communicated to primary team via this note.    Vineet Hodges MD   Division of Renal Disease and Hypertension  [facility]  Vocera Web Console        Interval History :   Nursing and provider notes reviewed.  Creatinine rise between HD runs was 0.54 mg/dl, slightly less than prior. She was asleep on dialysis when seen on rounds and did not wake to voice.       Physical Exam:   No intake/output data recorded.   BP (!) 149/72   Pulse 55   Temp 98.4  F (36.9  C) (Oral)   Resp 13   Ht 1.549 m (5' 0.98\")   Wt 57 kg (125 lb 10.6 oz)   LMP  (LMP Unknown)   SpO2 97%   BMI 23.76 kg/m       GENERAL APPEARANCE: NAD, asleep   Mouth: MMM  PULM: normal work of breathing.  Lungs CTAB  CV: RRR. No rub, gallop     trace left foot edema  R AKA  INTEGUMENT: no cyanosis, no rash on exposed skin  NEURO: face symmetric    Labs:   All labs reviewed by me  Electrolytes/Renal -   Recent Labs   Lab Test 05/14/24  0805 05/13/24  0706 05/12/24  1309 05/12/24  0554   * 129*  --  132*   POTASSIUM 4.4 4.3  --  3.8   CHLORIDE 96* 96*  --  99   CO2 25 25  --  25   BUN 22.5 14.2  --  8.2   CR 3.18* 2.64*  --  2.07*   GLC 90 68*  --  75   SONIA 7.5* 7.4*  --  7.5*   MAG 1.8 1.9 2.1 1.5*   PHOS 3.7 2.6  --  2.2*       CBC -   Recent Labs   Lab Test 05/14/24  0805 05/09/24  0655 05/07/24  0553   WBC 4.0 3.2* 3.3*   HGB 10.2* 9.7* 9.4*    191 202       LFTs -   Recent Labs   Lab Test 05/14/24  0805 05/13/24  0706 05/12/24  0554 05/08/24  0546 05/07/24  0553 04/30/24  0653   ALKPHOS 82  --   --   --  90 100 "   BILITOTAL 0.3  --   --   --  0.5 0.4   ALT 7  --   --   --  12 24   AST 16  --   --   --  14 21   PROTTOTAL 4.3*  --   --   --  4.1* 4.3*   ALBUMIN 2.0* 1.9* 1.8*   < > 1.9* 2.0*  2.0*    < > = values in this interval not displayed.       Iron Panel -   Recent Labs   Lab Test 04/10/24  0559 04/09/24  1817 01/08/24  1542 12/11/23  0550   IRON 22*  --  71 122   IRONSAT 18  --  17 32   BRYN  --  609* 25 23       Current Medications:  Current Facility-Administered Medications   Medication Dose Route Frequency Provider Last Rate Last Admin    amLODIPine (NORVASC) tablet 10 mg  10 mg Oral Daily Corrina Hurt PA-C   10 mg at 05/14/24 1033    bumetanide (BUMEX) tablet 4 mg  4 mg Oral Once per day on Sunday Monday Wednesday Friday Corrina Hurt PA-C   4 mg at 05/13/24 1010    carvedilol (COREG) tablet 6.25 mg  6.25 mg Oral BID w/meals Corrina Hurt PA-C   6.25 mg at 05/14/24 1032    cetirizine (zyrTEC) tablet 5 mg  5 mg Oral Daily Corrina Hurt PA-C   5 mg at 05/14/24 1031    clopidogrel (PLAVIX) tablet 75 mg  75 mg Oral Daily Corrina Hurt PA-C   75 mg at 05/14/24 1032    famotidine (PEPCID) tablet 20 mg  20 mg Oral Q48H Corrina Hurt PA-C   20 mg at 05/12/24 1638    fluticasone-vilanterol (BREO ELLIPTA) 200-25 MCG/ACT inhaler 1 puff  1 puff Inhalation Daily Corrina Hurt PA-C   1 puff at 05/14/24 1029    gabapentin (NEURONTIN) capsule 200 mg  200 mg Oral Once per day on Tuesday Thursday Saturday Corrina Hurt PA-C   200 mg at 05/11/24 2154    heparin ANTICOAGULANT injection 5,000 Units  5,000 Units Subcutaneous Q12H Corrina Hurt PA-C   5,000 Units at 05/14/24 1030    lactobacillus rhamnosus (GG) (CULTURELL) capsule 1 capsule  1 capsule Oral BID Husam Coulter MD   1 capsule at 05/14/24 1033    miconazole (MICATIN) 2 % powder   Topical BID Corrina Hurt PA-C   Given at 05/14/24 1034    multivitamin RENAL (TRIPHROCAPS) capsule 1 capsule  1  capsule Oral Daily Corrina Hurt PA-C   1 capsule at 05/12/24 1227    nicotine (NICODERM CQ) 14 MG/24HR 24 hr patch 1 patch  1 patch Transdermal Daily Corrina Hurt PA-C   1 patch at 05/14/24 1034    polyethylene glycol (MIRALAX) Packet 17 g  17 g Oral Daily Corrina Hurt PA-C   17 g at 05/14/24 1029    rosuvastatin (CRESTOR) tablet 10 mg  10 mg Oral At Bedtime Corrina Hurt PA-C   10 mg at 05/13/24 2137    senna-docusate (SENOKOT-S/PERICOLACE) 8.6-50 MG per tablet 1-2 tablet  1-2 tablet Oral BID Corrina Hurt PA-C   1 tablet at 05/14/24 1030    silver sulfADIAZINE (SILVADENE) 1 % cream   Topical Daily Gala Lo MD   Given at 05/14/24 1035    sodium chloride (PF) 0.9% PF flush 9 mL  9 mL Intracatheter During Dialysis/CRRT (from stock) Vineet Hodges MD        sodium chloride (PF) 0.9% PF flush 9 mL  9 mL Intracatheter During Dialysis/CRRT (from stock) Vineet Hodges MD         Current Facility-Administered Medications   Medication Dose Route Frequency Provider Last Rate Last Admin     Vineet Hodges MD

## 2024-05-14 NOTE — PLAN OF CARE
Paged at 5:21 pm about BP >180 systolic - coreg given - 175 upon recheck. Will order Prn hydralazine for systolics >185- discussed with nursing lean towards hyperperfusing kidneys over hypoperfusing therefore set parameter high for PRN bp med. Primary team and nephrology can reassess BP regimen in AM.

## 2024-05-14 NOTE — PLAN OF CARE
"  VS: BP (!) 177/78   Pulse 60   Temp 98.4  F (36.9  C) (Oral)   Resp 19   Ht 1.549 m (5' 0.98\")   Wt 57 kg (125 lb 10.6 oz)   LMP  (LMP Unknown)   SpO2 97%   BMI 23.76 kg/m       O2: 97% on RA    Output: Pt incontinent of B&B.   Last BM: 05/13/24   Activity: Mod I in room   Skin: R BKA. Wound on Coccyx.   Pain: Tylenol for pain   CMS: AOX4. Pt denies CP, SOB, dizziness, numbness, nausea.   Dressing: CDI   Diet: Gluten free, regular texture, thin liquid diet.    LDA: R CVC for hemodialysis. R PIV SL.   Plan: Continue POC                "

## 2024-05-14 NOTE — PLAN OF CARE
Discharge Plan: home with HH PT vs TCU     Precautions: fall, NWB of RLE, sacral wound, elevate RLE in bed    Current Status:  Bed Mobility: Humaira   Transfer: [employer] slideboard bed<>w/c, Siddharth for commode.   Gait: unable, unsafe  Stairs: not tested  Balance: Stable dynamic sitting.     Assessment: Focus on R hip stretching and strengthening today. Introduced prone hip stretch, patient tolerated well.     Other Barriers to Discharge (DME, Family Training, etc):   DME: K3 w/c (delivery scheduled for 5/10), slideboard and ramp (emailed handouts to sister)    Family training: Completed w/ brother on 5/10.

## 2024-05-14 NOTE — PROGRESS NOTES
Date: 5/14/2024  Time: 1515     Data:  Pre Wt:   57 kg  Post Wt:  55 kg   Weight change: - 2 kg  Ultrafiltration - Post Run Net Total Removed (mL):  200 ml  Vascular Access Status: CVC patent  Dialyzer Rinse:  Light  Total Blood Volume Processed: 76.3 L   Total Dialysis (Treatment) Time:   3.5 Hrs  Dialysate Bath: K 3, Ca 3  Heparin: Heparin: None     Lab:   No    Interventions:  Dialysis done through right chest CVC tunneled  UF set to 2.3 Liters of fluid removal, accommodating priming and rinse back volumes. ,   Meds administered per MAR, Epogen 10,000 units given.  See Flowsheet for Crit Profile throughout the run. Treatment has ended safely and  blood is rinsed back completely  Catheter lumens flushed with saline and locked with saline, catheter caps (ClearGuard ) changed post HD  Report given to PCN, sent back to [facility] room in stable condition     Assessment:  A/O x 4, calm & cooperative, denies pain  Lung sounds clear anterior  CVC intact, previous dressing clean and dry                Plan:    Per Renal team    Bertin Burks, RN, BSN.  Acute Dialysis RN  [facility]

## 2024-05-15 ENCOUNTER — DOCUMENTATION ONLY (OUTPATIENT)
Dept: MULTI SPECIALTY CLINIC | Facility: CLINIC | Age: 64
End: 2024-05-15
Payer: COMMERCIAL

## 2024-05-15 ENCOUNTER — HOSPITAL ENCOUNTER (INPATIENT)
Facility: CLINIC | Age: 64
LOS: 14 days | Discharge: NURSING, REHAB OR LONG-TERM CARE | DRG: 464 | End: 2024-05-29
Attending: INTERNAL MEDICINE | Admitting: INTERNAL MEDICINE
Payer: COMMERCIAL

## 2024-05-15 ENCOUNTER — APPOINTMENT (OUTPATIENT)
Dept: OCCUPATIONAL THERAPY | Facility: CLINIC | Age: 64
DRG: 559 | End: 2024-05-15
Attending: PHYSICAL MEDICINE & REHABILITATION
Payer: COMMERCIAL

## 2024-05-15 ENCOUNTER — APPOINTMENT (OUTPATIENT)
Dept: PHYSICAL THERAPY | Facility: CLINIC | Age: 64
DRG: 559 | End: 2024-05-15
Attending: PHYSICAL MEDICINE & REHABILITATION
Payer: COMMERCIAL

## 2024-05-15 ENCOUNTER — APPOINTMENT (OUTPATIENT)
Dept: ULTRASOUND IMAGING | Facility: CLINIC | Age: 64
DRG: 464 | End: 2024-05-15
Payer: COMMERCIAL

## 2024-05-15 ENCOUNTER — HOSPITAL ENCOUNTER (INPATIENT)
Facility: CLINIC | Age: 64
Setting detail: SURGERY ADMIT
End: 2024-05-15
Attending: SURGERY | Admitting: SURGERY
Payer: COMMERCIAL

## 2024-05-15 VITALS
HEART RATE: 69 BPM | BODY MASS INDEX: 23.73 KG/M2 | RESPIRATION RATE: 18 BRPM | OXYGEN SATURATION: 95 % | HEIGHT: 61 IN | SYSTOLIC BLOOD PRESSURE: 140 MMHG | WEIGHT: 125.66 LBS | DIASTOLIC BLOOD PRESSURE: 67 MMHG | TEMPERATURE: 98 F

## 2024-05-15 DIAGNOSIS — L89.159 PRESSURE INJURY OF SKIN OF SACRAL REGION, UNSPECIFIED INJURY STAGE: ICD-10-CM

## 2024-05-15 DIAGNOSIS — T87.9 AKA STUMP COMPLICATION (H): Primary | ICD-10-CM

## 2024-05-15 DIAGNOSIS — I73.9 PAD (PERIPHERAL ARTERY DISEASE) (H): ICD-10-CM

## 2024-05-15 DIAGNOSIS — N19 RENAL FAILURE, UNSPECIFIED CHRONICITY: ICD-10-CM

## 2024-05-15 DIAGNOSIS — Z89.611 S/P AKA (ABOVE KNEE AMPUTATION) UNILATERAL, RIGHT (H): ICD-10-CM

## 2024-05-15 DIAGNOSIS — I10 BENIGN ESSENTIAL HYPERTENSION: ICD-10-CM

## 2024-05-15 PROBLEM — T14.8XXA WOUND INFECTION: Status: ACTIVE | Noted: 2024-05-15

## 2024-05-15 PROBLEM — L08.9 WOUND INFECTION: Status: ACTIVE | Noted: 2024-05-15

## 2024-05-15 LAB
ALBUMIN SERPL BCG-MCNC: 1.8 G/DL (ref 3.5–5.2)
ANION GAP SERPL CALCULATED.3IONS-SCNC: 8 MMOL/L (ref 7–15)
BUN SERPL-MCNC: 15.5 MG/DL (ref 8–23)
CALCIUM SERPL-MCNC: 7.4 MG/DL (ref 8.8–10.2)
CHLORIDE SERPL-SCNC: 99 MMOL/L (ref 98–107)
CREAT SERPL-MCNC: 2.07 MG/DL (ref 0.51–0.95)
DEPRECATED HCO3 PLAS-SCNC: 24 MMOL/L (ref 22–29)
EGFRCR SERPLBLD CKD-EPI 2021: 26 ML/MIN/1.73M2
GLUCOSE SERPL-MCNC: 75 MG/DL (ref 70–99)
HOLD SPECIMEN: NORMAL
PHOSPHATE SERPL-MCNC: 2.7 MG/DL (ref 2.5–4.5)
POTASSIUM SERPL-SCNC: 4.4 MMOL/L (ref 3.4–5.3)
SODIUM SERPL-SCNC: 131 MMOL/L (ref 135–145)

## 2024-05-15 PROCEDURE — G0463 HOSPITAL OUTPT CLINIC VISIT: HCPCS

## 2024-05-15 PROCEDURE — 97110 THERAPEUTIC EXERCISES: CPT | Mod: GO

## 2024-05-15 PROCEDURE — 99239 HOSP IP/OBS DSCHRG MGMT >30: CPT | Performed by: PHYSICIAN ASSISTANT

## 2024-05-15 PROCEDURE — 250N000013 HC RX MED GY IP 250 OP 250 PS 637: Performed by: INTERNAL MEDICINE

## 2024-05-15 PROCEDURE — 250N000013 HC RX MED GY IP 250 OP 250 PS 637: Performed by: PHYSICIAN ASSISTANT

## 2024-05-15 PROCEDURE — 99223 1ST HOSP IP/OBS HIGH 75: CPT | Mod: AI | Performed by: INTERNAL MEDICINE

## 2024-05-15 PROCEDURE — 97530 THERAPEUTIC ACTIVITIES: CPT | Mod: GO

## 2024-05-15 PROCEDURE — 36415 COLL VENOUS BLD VENIPUNCTURE: CPT | Performed by: PHYSICIAN ASSISTANT

## 2024-05-15 PROCEDURE — 120N000001 HC R&B MED SURG/OB

## 2024-05-15 PROCEDURE — 97530 THERAPEUTIC ACTIVITIES: CPT | Mod: GP

## 2024-05-15 PROCEDURE — 250N000011 HC RX IP 250 OP 636: Performed by: PHYSICIAN ASSISTANT

## 2024-05-15 PROCEDURE — 250N000013 HC RX MED GY IP 250 OP 250 PS 637: Performed by: PHYSICAL MEDICINE & REHABILITATION

## 2024-05-15 PROCEDURE — 80069 RENAL FUNCTION PANEL: CPT | Performed by: PHYSICIAN ASSISTANT

## 2024-05-15 PROCEDURE — 97535 SELF CARE MNGMENT TRAINING: CPT | Mod: GO

## 2024-05-15 PROCEDURE — 93926 LOWER EXTREMITY STUDY: CPT | Mod: RT

## 2024-05-15 PROCEDURE — 99221 1ST HOSP IP/OBS SF/LOW 40: CPT | Mod: 24 | Performed by: SURGERY

## 2024-05-15 RX ORDER — POLYETHYLENE GLYCOL 3350 17 G/17G
17 POWDER, FOR SOLUTION ORAL DAILY
Status: ON HOLD | DISCHARGE
Start: 2024-05-15 | End: 2024-05-29

## 2024-05-15 RX ORDER — CETIRIZINE HYDROCHLORIDE 5 MG/1
5 TABLET ORAL DAILY
Status: DISCONTINUED | OUTPATIENT
Start: 2024-05-16 | End: 2024-05-29 | Stop reason: HOSPADM

## 2024-05-15 RX ORDER — PROCHLORPERAZINE 25 MG
12.5 SUPPOSITORY, RECTAL RECTAL EVERY 12 HOURS PRN
Status: DISCONTINUED | OUTPATIENT
Start: 2024-05-15 | End: 2024-05-16

## 2024-05-15 RX ORDER — NICOTINE 21 MG/24HR
1 PATCH, TRANSDERMAL 24 HOURS TRANSDERMAL DAILY
Status: DISCONTINUED | OUTPATIENT
Start: 2024-05-16 | End: 2024-05-29 | Stop reason: HOSPADM

## 2024-05-15 RX ORDER — ACETAMINOPHEN 500 MG
500-1000 TABLET ORAL EVERY 6 HOURS PRN
Status: DISCONTINUED | OUTPATIENT
Start: 2024-05-15 | End: 2024-05-15

## 2024-05-15 RX ORDER — ROSUVASTATIN CALCIUM 10 MG/1
10 TABLET, COATED ORAL AT BEDTIME
Status: ON HOLD | DISCHARGE
Start: 2024-05-15 | End: 2024-06-07

## 2024-05-15 RX ORDER — DIPHENOXYLATE HCL/ATROPINE 2.5-.025MG
1 TABLET ORAL 4 TIMES DAILY PRN
Status: ON HOLD | COMMUNITY
End: 2024-06-07

## 2024-05-15 RX ORDER — SILVER SULFADIAZINE 10 MG/G
CREAM TOPICAL DAILY
DISCHARGE
Start: 2024-05-15

## 2024-05-15 RX ORDER — GABAPENTIN 100 MG/1
200 CAPSULE ORAL
Status: DISCONTINUED | OUTPATIENT
Start: 2024-05-16 | End: 2024-05-29 | Stop reason: HOSPADM

## 2024-05-15 RX ORDER — ONDANSETRON 2 MG/ML
4 INJECTION INTRAMUSCULAR; INTRAVENOUS EVERY 6 HOURS PRN
Status: DISCONTINUED | OUTPATIENT
Start: 2024-05-15 | End: 2024-05-16

## 2024-05-15 RX ORDER — AMLODIPINE BESYLATE 10 MG/1
10 TABLET ORAL DAILY
Status: ON HOLD | DISCHARGE
Start: 2024-05-16 | End: 2024-06-07

## 2024-05-15 RX ORDER — BISACODYL 10 MG
10 SUPPOSITORY, RECTAL RECTAL DAILY PRN
Status: ON HOLD | DISCHARGE
Start: 2024-05-15 | End: 2024-06-07

## 2024-05-15 RX ORDER — NALOXONE HYDROCHLORIDE 0.4 MG/ML
0.4 INJECTION, SOLUTION INTRAMUSCULAR; INTRAVENOUS; SUBCUTANEOUS
Status: DISCONTINUED | OUTPATIENT
Start: 2024-05-15 | End: 2024-05-29 | Stop reason: HOSPADM

## 2024-05-15 RX ORDER — CARVEDILOL 3.12 MG/1
6.25 TABLET ORAL 2 TIMES DAILY WITH MEALS
Status: DISCONTINUED | OUTPATIENT
Start: 2024-05-15 | End: 2024-05-23

## 2024-05-15 RX ORDER — CLINDAMYCIN PHOSPHATE 900 MG/50ML
900 INJECTION, SOLUTION INTRAVENOUS SEE ADMIN INSTRUCTIONS
Status: CANCELLED | OUTPATIENT
Start: 2024-05-15

## 2024-05-15 RX ORDER — CLINDAMYCIN PHOSPHATE 900 MG/50ML
900 INJECTION, SOLUTION INTRAVENOUS
Status: CANCELLED | OUTPATIENT
Start: 2024-05-15

## 2024-05-15 RX ORDER — AMOXICILLIN 250 MG
2 CAPSULE ORAL 2 TIMES DAILY PRN
Status: DISCONTINUED | OUTPATIENT
Start: 2024-05-15 | End: 2024-05-29 | Stop reason: HOSPADM

## 2024-05-15 RX ORDER — FAMOTIDINE 20 MG/1
20 TABLET, FILM COATED ORAL
Status: ON HOLD | DISCHARGE
Start: 2024-05-16 | End: 2024-06-07

## 2024-05-15 RX ORDER — ACETAMINOPHEN 650 MG/1
650 SUPPOSITORY RECTAL EVERY 4 HOURS PRN
Status: DISCONTINUED | OUTPATIENT
Start: 2024-05-15 | End: 2024-05-29 | Stop reason: HOSPADM

## 2024-05-15 RX ORDER — POLYETHYLENE GLYCOL 3350 17 G/17G
17 POWDER, FOR SOLUTION ORAL DAILY PRN
Status: ON HOLD | COMMUNITY
End: 2024-06-07

## 2024-05-15 RX ORDER — NALOXONE HYDROCHLORIDE 0.4 MG/ML
0.2 INJECTION, SOLUTION INTRAMUSCULAR; INTRAVENOUS; SUBCUTANEOUS
Status: DISCONTINUED | OUTPATIENT
Start: 2024-05-15 | End: 2024-05-29 | Stop reason: HOSPADM

## 2024-05-15 RX ORDER — NITROGLYCERIN 0.4 MG/1
0.4 TABLET SUBLINGUAL EVERY 5 MIN PRN
Status: DISCONTINUED | OUTPATIENT
Start: 2024-05-15 | End: 2024-05-29 | Stop reason: HOSPADM

## 2024-05-15 RX ORDER — B COMPLEX, C NO.20/FOLIC ACID 1 MG
1 CAPSULE ORAL DAILY
Status: DISCONTINUED | OUTPATIENT
Start: 2024-05-16 | End: 2024-05-29 | Stop reason: HOSPADM

## 2024-05-15 RX ORDER — FLUTICASONE FUROATE AND VILANTEROL 200; 25 UG/1; UG/1
1 POWDER RESPIRATORY (INHALATION) DAILY
Status: DISCONTINUED | OUTPATIENT
Start: 2024-05-15 | End: 2024-05-29 | Stop reason: HOSPADM

## 2024-05-15 RX ORDER — FAMOTIDINE 20 MG/1
20 TABLET, FILM COATED ORAL
Status: DISCONTINUED | OUTPATIENT
Start: 2024-05-16 | End: 2024-05-16

## 2024-05-15 RX ORDER — HEPARIN SODIUM 5000 [USP'U]/ML
5000 INJECTION, SOLUTION INTRAVENOUS; SUBCUTANEOUS EVERY 12 HOURS
Status: ON HOLD | COMMUNITY
End: 2024-05-15

## 2024-05-15 RX ORDER — ONDANSETRON 4 MG/1
4 TABLET, ORALLY DISINTEGRATING ORAL EVERY 6 HOURS PRN
Status: DISCONTINUED | OUTPATIENT
Start: 2024-05-15 | End: 2024-05-15

## 2024-05-15 RX ORDER — OXYCODONE HYDROCHLORIDE 5 MG/1
5 TABLET ORAL EVERY 4 HOURS PRN
Status: DISCONTINUED | OUTPATIENT
Start: 2024-05-15 | End: 2024-05-29 | Stop reason: HOSPADM

## 2024-05-15 RX ORDER — ROSUVASTATIN CALCIUM 10 MG/1
10 TABLET, COATED ORAL AT BEDTIME
Status: DISCONTINUED | OUTPATIENT
Start: 2024-05-15 | End: 2024-05-29 | Stop reason: HOSPADM

## 2024-05-15 RX ORDER — HEPARIN SODIUM 10000 [USP'U]/ML
5000 INJECTION, SOLUTION INTRAVENOUS; SUBCUTANEOUS EVERY 12 HOURS
Status: ON HOLD | COMMUNITY
End: 2024-05-29

## 2024-05-15 RX ORDER — AMOXICILLIN 250 MG
1-2 CAPSULE ORAL 2 TIMES DAILY
Status: ON HOLD | DISCHARGE
Start: 2024-05-15 | End: 2024-05-29

## 2024-05-15 RX ORDER — SILVER SULFADIAZINE 10 MG/G
CREAM TOPICAL DAILY
Status: DISCONTINUED | OUTPATIENT
Start: 2024-05-16 | End: 2024-05-29 | Stop reason: HOSPADM

## 2024-05-15 RX ORDER — AMLODIPINE BESYLATE 10 MG/1
10 TABLET ORAL DAILY
Status: DISCONTINUED | OUTPATIENT
Start: 2024-05-16 | End: 2024-05-29 | Stop reason: HOSPADM

## 2024-05-15 RX ORDER — ONDANSETRON 4 MG/1
4 TABLET, ORALLY DISINTEGRATING ORAL EVERY 6 HOURS PRN
Status: DISCONTINUED | OUTPATIENT
Start: 2024-05-15 | End: 2024-05-16

## 2024-05-15 RX ORDER — GABAPENTIN 100 MG/1
200 CAPSULE ORAL
Status: ON HOLD | DISCHARGE
Start: 2024-05-16 | End: 2024-06-07

## 2024-05-15 RX ORDER — BISACODYL 10 MG
10 SUPPOSITORY, RECTAL RECTAL DAILY PRN
Status: DISCONTINUED | OUTPATIENT
Start: 2024-05-15 | End: 2024-05-16

## 2024-05-15 RX ORDER — PROCHLORPERAZINE MALEATE 5 MG
5 TABLET ORAL EVERY 6 HOURS PRN
Status: DISCONTINUED | OUTPATIENT
Start: 2024-05-15 | End: 2024-05-16

## 2024-05-15 RX ORDER — HYDRALAZINE HYDROCHLORIDE 10 MG/1
10 TABLET, FILM COATED ORAL 4 TIMES DAILY PRN
Status: ON HOLD | DISCHARGE
Start: 2024-05-15 | End: 2024-06-07

## 2024-05-15 RX ORDER — BUMETANIDE 1 MG/1
4 TABLET ORAL
Status: DISCONTINUED | OUTPATIENT
Start: 2024-05-17 | End: 2024-05-23

## 2024-05-15 RX ORDER — LACTOBACILLUS RHAMNOSUS GG 10B CELL
1 CAPSULE ORAL 2 TIMES DAILY
Status: DISCONTINUED | OUTPATIENT
Start: 2024-05-15 | End: 2024-05-29 | Stop reason: HOSPADM

## 2024-05-15 RX ORDER — BUMETANIDE 2 MG/1
4 TABLET ORAL
Status: ON HOLD | DISCHARGE
Start: 2024-05-17 | End: 2024-05-29

## 2024-05-15 RX ORDER — CLOPIDOGREL BISULFATE 75 MG/1
75 TABLET ORAL DAILY
Status: DISCONTINUED | OUTPATIENT
Start: 2024-05-16 | End: 2024-05-29 | Stop reason: HOSPADM

## 2024-05-15 RX ORDER — CALCIUM CARBONATE 500 MG/1
1000 TABLET, CHEWABLE ORAL 4 TIMES DAILY PRN
Status: DISCONTINUED | OUTPATIENT
Start: 2024-05-15 | End: 2024-05-16

## 2024-05-15 RX ORDER — AMOXICILLIN 250 MG
1 CAPSULE ORAL 2 TIMES DAILY PRN
Status: DISCONTINUED | OUTPATIENT
Start: 2024-05-15 | End: 2024-05-29 | Stop reason: HOSPADM

## 2024-05-15 RX ORDER — LACTOBACILLUS RHAMNOSUS GG 10B CELL
1 CAPSULE ORAL 2 TIMES DAILY
Status: ON HOLD | DISCHARGE
Start: 2024-05-15 | End: 2024-06-07

## 2024-05-15 RX ORDER — POLYETHYLENE GLYCOL 3350 17 G/17G
17 POWDER, FOR SOLUTION ORAL DAILY
Status: DISCONTINUED | OUTPATIENT
Start: 2024-05-16 | End: 2024-05-16

## 2024-05-15 RX ORDER — LIDOCAINE 40 MG/G
CREAM TOPICAL
Status: DISCONTINUED | OUTPATIENT
Start: 2024-05-15 | End: 2024-05-16

## 2024-05-15 RX ORDER — HYDRALAZINE HYDROCHLORIDE 10 MG/1
10 TABLET, FILM COATED ORAL 4 TIMES DAILY PRN
Status: DISCONTINUED | OUTPATIENT
Start: 2024-05-15 | End: 2024-05-29 | Stop reason: HOSPADM

## 2024-05-15 RX ORDER — CETIRIZINE HYDROCHLORIDE 5 MG/1
5 TABLET ORAL DAILY
Status: ON HOLD | DISCHARGE
Start: 2024-05-16 | End: 2024-06-07

## 2024-05-15 RX ORDER — AMOXICILLIN 250 MG
1-2 CAPSULE ORAL 2 TIMES DAILY
Status: DISCONTINUED | OUTPATIENT
Start: 2024-05-15 | End: 2024-05-29 | Stop reason: HOSPADM

## 2024-05-15 RX ORDER — ACETAMINOPHEN 325 MG/1
650 TABLET ORAL EVERY 4 HOURS PRN
Status: DISCONTINUED | OUTPATIENT
Start: 2024-05-15 | End: 2024-05-29 | Stop reason: HOSPADM

## 2024-05-15 RX ADMIN — Medication 1 CAPSULE: at 09:47

## 2024-05-15 RX ADMIN — Medication 1 CAPSULE: at 12:40

## 2024-05-15 RX ADMIN — AMLODIPINE BESYLATE 10 MG: 10 TABLET ORAL at 08:26

## 2024-05-15 RX ADMIN — CARVEDILOL 6.25 MG: 6.25 TABLET, FILM COATED ORAL at 08:26

## 2024-05-15 RX ADMIN — HEPARIN SODIUM 5000 UNITS: 5000 INJECTION, SOLUTION INTRAVENOUS; SUBCUTANEOUS at 09:48

## 2024-05-15 RX ADMIN — SILVER SULFADIAZINE: 10 CREAM TOPICAL at 14:18

## 2024-05-15 RX ADMIN — CARVEDILOL 6.25 MG: 3.12 TABLET, FILM COATED ORAL at 19:03

## 2024-05-15 RX ADMIN — ACETAMINOPHEN 1000 MG: 500 TABLET ORAL at 08:26

## 2024-05-15 RX ADMIN — MICONAZOLE NITRATE: 2 POWDER TOPICAL at 20:44

## 2024-05-15 RX ADMIN — MICONAZOLE NITRATE: 20 POWDER TOPICAL at 09:56

## 2024-05-15 RX ADMIN — BUMETANIDE 4 MG: 1 TABLET ORAL at 09:51

## 2024-05-15 RX ADMIN — ROSUVASTATIN CALCIUM 10 MG: 10 TABLET, FILM COATED ORAL at 21:40

## 2024-05-15 RX ADMIN — NICOTINE 1 PATCH: 14 PATCH, EXTENDED RELEASE TRANSDERMAL at 08:25

## 2024-05-15 RX ADMIN — Medication 1 CAPSULE: at 20:43

## 2024-05-15 RX ADMIN — CLOPIDOGREL BISULFATE 75 MG: 75 TABLET ORAL at 09:48

## 2024-05-15 RX ADMIN — CETIRIZINE HYDROCHLORIDE 5 MG: 5 TABLET ORAL at 09:48

## 2024-05-15 RX ADMIN — SENNOSIDES AND DOCUSATE SODIUM 1 TABLET: 8.6; 5 TABLET ORAL at 09:48

## 2024-05-15 RX ADMIN — ACETAMINOPHEN 1000 MG: 500 TABLET ORAL at 14:48

## 2024-05-15 RX ADMIN — SENNOSIDES AND DOCUSATE SODIUM 1 TABLET: 50; 8.6 TABLET ORAL at 20:43

## 2024-05-15 RX ADMIN — POLYETHYLENE GLYCOL 3350 17 G: 17 POWDER, FOR SOLUTION ORAL at 09:47

## 2024-05-15 ASSESSMENT — ACTIVITIES OF DAILY LIVING (ADL)
ADLS_ACUITY_SCORE: 36
ADLS_ACUITY_SCORE: 35
ADLS_ACUITY_SCORE: 36
ADLS_ACUITY_SCORE: 42
ADLS_ACUITY_SCORE: 36
ADLS_ACUITY_SCORE: 56
ADLS_ACUITY_SCORE: 36
ADLS_ACUITY_SCORE: 58
ADLS_ACUITY_SCORE: 58
ADLS_ACUITY_SCORE: 36
ADLS_ACUITY_SCORE: 36
ADLS_ACUITY_SCORE: 57
ADLS_ACUITY_SCORE: 36
ADLS_ACUITY_SCORE: 36
ADLS_ACUITY_SCORE: 53
ADLS_ACUITY_SCORE: 36

## 2024-05-15 NOTE — PHARMACY-ADMISSION MEDICATION HISTORY
Pharmacist Admission Medication History    Admission medication history is complete. The information provided in this note is only as accurate as the sources available at the time of the update.    Information Source(s): Hospital records ([facility]) via N/A    Pertinent Information: none    Changes made to PTA medication list:  Added: Heparin, Miralax prn, Lomotil  Deleted: None  Changed: None    Medication History Completed By: Yanira Mario [facility] 5/15/2024 6:29 PM    PTA Med List   Medication Sig Last Dose    acetaminophen (TYLENOL) 500 MG tablet Take 500-1,000 mg by mouth every 6 hours as needed for mild pain 5/15/2024 at 1500    [START ON 5/16/2024] amLODIPine (NORVASC) 10 MG tablet Take 1 tablet (10 mg) by mouth daily 5/15/2024 at 0830    bisacodyl (DULCOLAX) 10 MG suppository Place 1 suppository (10 mg) rectally daily as needed for constipation prn    [START ON 5/17/2024] bumetanide (BUMEX) 2 MG tablet Take 2 tablets (4 mg) by mouth four times a week (Patient taking differently: Take 4 mg by mouth four times a week Sunday, Monday, Wed, Friday) 5/15/2024 at 1000    carvedilol (COREG) 6.25 MG tablet Take 1 tablet (6.25 mg) by mouth 2 times daily (with meals) 5/15/2024 at 0830    [START ON 5/16/2024] cetirizine (ZYRTEC) 5 MG tablet Take 1 tablet (5 mg) by mouth daily 5/15/2024 at 1000    clopidogrel (PLAVIX) 75 MG tablet Take 1 tablet (75 mg) by mouth daily 5/15/2024 at 1000    diphenoxylate-atropine (LOMOTIL) 2.5-0.025 MG tablet Take 1 tablet by mouth 4 times daily as needed for diarrhea 4/30/2024    [START ON 5/16/2024] famotidine (PEPCID) 20 MG tablet Take 1 tablet (20 mg) by mouth every 48 hours 5/14/2024 at 1630    fluticasone-vilanterol (BREO ELLIPTA) 200-25 MCG/ACT inhaler Inhale 1 puff into the lungs daily 5/14/2024 at 1030    [START ON 5/16/2024] gabapentin (NEURONTIN) 100 MG capsule Take 2 capsules (200 mg) by mouth three times a week (Patient taking differently: Take 200 mg by mouth three times a  week Willy Lan, Sat) 5/14/2024 at 2100    heparin 39765 units/mL injection Inject 5,000 Units Subcutaneous every 12 hours 5/15/2024 at 1000    hydrALAZINE (APRESOLINE) 10 MG tablet Take 1 tablet (10 mg) by mouth 4 times daily as needed for high blood pressure (SBP > 185.) prn    lactobacillus rhamnosus, GG, (CULTURELL) capsule Take 1 capsule by mouth 2 times daily 5/15/2024 at 1000    miconazole (MICATIN) 2 % external powder Apply topically 2 times daily 5/15/2024 at 1000    multivitamin RENAL (TRIPHROCAPS) 1 capsule capsule Take 1 capsule by mouth daily 5/15/2024 at 1300    nicotine (NICODERM CQ) 14 MG/24HR 24 hr patch Place 1 patch onto the skin daily 5/15/2024 at 0830    nitroGLYcerin (NITROSTAT) 0.4 MG sublingual tablet Place 1 tablet (0.4 mg) under the tongue See Admin Instructions for chest pain prn    ondansetron (ZOFRAN ODT) 4 MG ODT tab Take 1 tablet (4 mg) by mouth every 6 hours as needed for nausea or vomiting 5/14/2024 at 2100    polyethylene glycol (MIRALAX) 17 g packet Take 17 g by mouth daily as needed for constipation prn    polyethylene glycol (MIRALAX) 17 GM/Dose powder Take 17 g by mouth daily 5/15/2024 at 1000    rosuvastatin (CRESTOR) 10 MG tablet Take 1 tablet (10 mg) by mouth at bedtime 5/14/2024 at 2100    senna-docusate (SENOKOT-S/PERICOLACE) 8.6-50 MG tablet Take 1-2 tablets by mouth 2 times daily 5/15/2024 at 1000    silver sulfADIAZINE (SILVADENE) 1 % external cream Apply topically daily 5/15/2024 at 1400

## 2024-05-15 NOTE — PROGRESS NOTES
Brief Nephrology Note  [facility]    It appears that the patient has left ARU and been admitted at [facility].     She will need a nephrology consult while at [facility] due to LIMA on dialysis. Her next dialysis due date, if sticking with TTS schedule, is 5/16.     Of note, while subtle, her creatinine rises (non-dialysis day to pre-dialysis day) appear to be getting less steep (was 0.71 rise one week ago, then 0.60 earlier this week, then 0.54 rise mid-week this week). This may be a sign of kidney recovery occurring.     Giovanni Hodges MD

## 2024-05-15 NOTE — PLAN OF CARE
Goal Outcome Evaluation:      Plan of Care Reviewed With: patient    Overall Patient Progress: improvingOverall Patient Progress: improving    Alert and oriented x 4, denies headache or dizziness. R AKA dressing changed, scant amount of serosanguinous drainage noted, scattered areas of what seem to be dry black scabs noted,  On track to discharge to D ortho floor, for evaluation of her AKA incision. Incontinent of bowel and bladder x 2, assisted with lydia care and brief change, use her call light appropriately, will continue poc

## 2024-05-15 NOTE — PLAN OF CARE
"Paged at 9:39 pm about pt AXR. Mildly increased stool burden still non obstruction. Will add one addt doc/senna dose then and time a daily PRN bisacodyl supp in AM with BMP to check for electrolytes. Discussed with nursing that provider note states will need to coordinate with dialysis team if having large BM- patient very concerned about bowels tonight stating \"I just want to give up\" and insists on action.   "

## 2024-05-15 NOTE — PLAN OF CARE
Goal Outcome Evaluation:  Overall Patient Progress: no changeOverall Patient Progress: no change    Patient is alert and oriented x4. Make needs known. Denied pain, sob, nausea or any weakness. Called to be changed for a small incontinent bm this shift. On hemodialysis. R Chest CVC and L piv in place. Piv flushed, saline locked. Stump dressing is cdi. Asleep during rounds. No issues overnight. Continue poc.
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
## 2024-05-15 NOTE — H&P
[facility]    History and Physical  Hospitalist       Date of Admission:  5/15/2024    Assessment & Plan   Shirley Hendricks is a 63 year old female with complex medical history which include anxiety, charcoaled Breonna tooth disease, GERD, hypertension, hyperlipidemia, lupus, severe peripheral artery disease, GERD, supraventricular tachycardia, asthma, was recently admitted in the hospital from 3/29/2024 to 4/22/2024 with right ischemic limb severe peripheral artery disease, ultimately require above-knee amputation the right side, course complicated by acute renal failure, rhabdomyolysis, sepsis, acute blood loss anemia, delirium, diarrhea and retroperitoneal hematoma will eventually discharged to acute rehab now coming from acute rehab because of wound dehiscence, necrosis mild erythema that needs debridement.    Status post right above-knee amputation  Wound dehiscence and necrosis with mild erythema  Severe peripheral artery disease  This is a 63-year-old female with complicated past medical history as above, and recent hospitalization as above.  She is now admitted to the hospital as per vascular surgery recommendation for her right stump wound and necrosis  She is scheduled to have debridement of her wound tomorrow by vascular surgery.  Consult vascular surgery, keep n.p.o. after midnight.  She is on Plavix 75 mg daily and Crestor 10 mg daily we will continue with that from tomorrow.  Hold Plavix till after the surgery and then resume it.  Previously she was on anticoagulation with Xarelto that was discontinued.  Further management of her wound as per vascular surgery.      Acute renal failure: Dialysis dependent  Patient course was complicated with renal failure, secondary to recurrent rhabdomyolysis and ischemic injury  She is dialysis dependent, currently on hemodialysis Tuesday Thursday and Saturday.  Will consult nephrology to continue dialysis while in the hospital.    Anemia  of chronic kidney disease  Patient has history of acute blood loss anemia, with retroperitoneal hematoma and surgeries.  Hemoglobin stable around 10.2 at this time.  No active bleeding at this time    Hypertension  Hyperlipidemia  History of coronary artery disease  No active chest pain at this time, blood pressure slightly on the higher side.  She is on amlodipine 10 mg daily, Coreg 6.25 mg twice daily, as needed hydralazine  And Bumex 4 mg 3-4 times a week.  For blood pressure remained elevated, recommend adjusting her medications,   can go up on Coreg as well as schedule her hydralazine.    Possible celiac disease  History of loose stools  Patient is noncompliant with gluten-free diet.  Mild abdominal tenderness on exam.  At some point need to follow-up with the GI and the counseling.    Asthma  GERD  No acute exacerbation of asthma, she is on Breo Ellipta we will continue with that.  Keep her on Pepcid 20 mg daily.    History of diabetes mellitus type 2  Last hemoglobin A1c was 5.2  Her Jardiance was discontinued.  At this time we will keep her on sliding scale insulin for correction and hypoglycemia protocol.  Currently on diet control    History of B-cell lymphoma  Followed by [place] [facility]  Continue outpatient surveillance and follow-up with oncology.    Hyponatremia  This is likely secondary to renal failure.  Nephrology to follow, he ultrafiltrate with dialysis.    DVT Prophylaxis: Pneumatic Compression Devices  Code Status: Full Code    Disposition: Expected discharge in 2 days once stable.    Discussed with the patient and the nursing staff taking care of the patient.    Manuel Robledo MD, MD    Primary Care Physician   [facility]as[facility] Benoit    Chief Complaint   Wound dehiscence and necrosis    History is obtained from the patient    History of Present Illness   Shirley Hendricks is a 63 year old female with complex medical history which include anxiety, charcoaled Breonna tooth disease, GERD, hypertension,  hyperlipidemia, lupus, severe peripheral artery disease, GERD, supraventricular tachycardia, asthma, was recently admitted in the hospital from 3/29/2024 to 4/22/2024 with right ischemic limb severe peripheral artery disease, ultimately require above-knee amputation the right side, course complicated by acute renal failure, rhabdomyolysis, sepsis, acute blood loss anemia, delirium, diarrhea and retroperitoneal hematoma will eventually discharged to acute rehab now coming from acute rehab because of wound dehiscence, necrosis mild erythema that needs debridement.    According to the patient she was doing fine at the rehab, and the pain is better controlled, when she noticed that she has more pain recently, wound is getting worse and more erythematous, she denies any fever chills chest pain shortness of breath orthopnea PND palpitation no dysuria hematuria or constipation.  She does have some diarrhea although she is on stool softeners/laxative as well.  She was followed by vascular surgery peripherally and they recommended wound debridement is already scheduled on 5/16/2024.  Because of the patient worsening renal failure she was started on dialysis during her last hospitalization and she is still on hemodialysis Tuesday Thursday and Saturday, she did have her dialysis yesterday and due for next dialysis tomorrow.    Rest of the review of system is negative at this time.    Past Medical History    I have reviewed this patient's medical history and updated it with pertinent information if needed.   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 clot    Post-menopausal     Reflux esophagitis 02/2004    EGD: esophagitis and medium HH    SBO (small bowel obstruction) (H) 08/10/2021    SVT (supraventricular tachycardia) (H24)     Thrombocytopenia (H24)     Uncomplicated asthma     Vitamin C deficiency 08/12/2018       Past Surgical History   I have reviewed this patient's surgical history and updated it with pertinent information if needed.  Past Surgical History:   Procedure Laterality Date    AMPUTATE LEG ABOVE KNEE N/A 4/1/2024    Procedure: AMPUTATION, ABOVE KNEE right leg with wound vac dressing, excision of anteriotibial bypass graft, closure of (previous interventional radiology) left groin incision;  Surgeon: José Luis Hernandez MD;  Location:  OR    AMPUTATE LEG ABOVE KNEE Right 4/9/2024    Procedure: COMPLETION RIGHT ABOVE KNEE AMPUTATION;  Surgeon: José Luis Hernandez MD;  Location:  OR    ANGIOGRAM      ANGIOGRAM Right 6/23/2021    Procedure: RIGHT LOWER EXTREMITY ANGIOGRAM WITH LEFT BRACHIAL ARTERY CUTDOWN;  Surgeon: José Luis Hernandez MD;  Location:  OR    BIOPSY MASS NECK Right 10/11/2023    Procedure: Right Parotid Biopsy;  Surgeon: Randal Mendoza MD;  Location:  OR    BYPASS GRAFT FEMOROPOPLITEAL Right 09/23/2020    Procedure: RIGHT FEMORAL GRAFT TO ABOVE-KNEE POPLITEAL BYPASS USING CADAVERIC SUPERFICIAL FEMORAL ARTERY;  Surgeon: Chadwick Lozano MD;  Location: [facility] OR    BYPASS GRAFT FEMOROPOPLITEAL Right 2/15/2022    Procedure: RIGHT SUPERFICIAL FEMORAL ARTERY GRAFT TO BELOW KNEE POPLITEAL BYPASS WITH CADAVERIC  [facility]  FEMORAL-POPLITEAL ARTERY SIZE: OUTER DIAMETER: 7MM - 6MM;  Surgeon: Chadwick Lozano;  Location:  OR    BYPASS GRAFT FEMOROPOPLITEAL Right 5/26/2023    Procedure: RIGHT DISTAL SUPERFICIAL FEMORAL GRAFT TO ANTERIOR TIBIAL ARTERY WITH 26 CENTIMETER CADAVERIC SUPERFICIAL FEMORAL ARTERY GRAFT;  Surgeon: Chadwick Lozano MD;  Location: [facility] OR    BYPASS GRAFT FEMOROPOPLITEAL Right 10/11/2023    Procedure: RIGHT FEMORAL TO POPLITEAL GRAFT THROMBECTOMY;  Surgeon: Chadwick Lozano MD;  Location:  OR    BYPASS GRAFT INSITU FEMOROPOPLITEAL Right 7/7/2021    Procedure: CREATION RIGHT FEMORAL TO POPLITEAL ARTERIAL BYPASS USING INSITU VEIN GRAFT;  Surgeon: José Luis Hernandez MD;  Location:  OR    CARDIAC CATHERIZATION  09/03/2009    multivessel CAD without target lesions, med mgmt indicated, preserved ef    CARPAL TUNNEL RELEASE RT/LT Right 05/20/2021    COLONOSCOPY N/A 12/12/2023    Procedure: Colonoscopy;  Surgeon: Corey Hoffman MD;  Location:  GI    COLONOSCOPY N/A 12/14/2023    Procedure: Colonoscopy;  Surgeon: Corey Hoffman MD;  Location:  GI    CV CORONARY ANGIOGRAM N/A 10/4/2023    Procedure: Coronary Angiogram;  Surgeon: Rogelio Ricks MD;  Location:  HEART CARDIAC CATH LAB    CV PCI N/A 10/4/2023    Procedure: Percutaneous Coronary Intervention;  Surgeon: Rogelio Ricks MD;  Location:  HEART CARDIAC CATH LAB    EMBOLECTOMY LOWER EXTREMITY Right 10/6/2023    Procedure: Right anterior tibial bypass with graft, Right tibial endarterectomy,thrombectomy, Right doraslis pedis thrombectomy, Anterior Tibial vein patch.;  Surgeon: Chadwick Lozano MD;  Location:  OR    ENDARTERECTOMY CAROTID Right 05/11/2016    Procedure: ENDARTERECTOMY CAROTID;  Surgeon: Chadwick Lozano MD;  Location:  OR    ENDARTERECTOMY CAROTID Left 06/08/2020    Procedure: LEFT CAROTID ENDARTERECTOMY with distal facal vein patch  and EEG;  Surgeon: Blake  Chadwick Osman MD;  Location:  OR    ESOPHAGOSCOPY, GASTROSCOPY, DUODENOSCOPY (EGD), COMBINED N/A 12/12/2023    Procedure: Esophagoscopy, gastroscopy, duodenoscopy (EGD), combined;  Surgeon: Corey Hoffman MD;  Location:  GI    FINE NEEDLE ASPIRATION WITHOUT IMAGING GUIDANCE Right 9/22/2023    Procedure: Fine needle aspiration without imaging guidance;  Surgeon: Kiersten Aguilera MD;  Location:  GI    FLUORESCENCE INTRAOPERATIVE VASCULAR ANGIOGRAPHY Right 12/28/2022    Procedure: RIGHT LEG ANGIOGRAM with intervention;  Surgeon: Chadwick Lozano MD;  Location:  OR    GYN SURGERY  left tube    left salpingectomy    IR ANGIOGRAM THROUGH CATHETER (ARTERIAL)  10/6/2023    IR ANGIOGRAM THROUGH CATHETER (ARTERIAL)  10/6/2023    IR ANGIOGRAM THROUGH CATHETER (ARTERIAL)  3/31/2024    IR ANGIOGRAM THROUGH CATHETER (ARTERIAL)  3/30/2024    IR CVC TUNNEL PLACEMENT > 5 YRS OF AGE  4/3/2024    IR CVC TUNNEL REVISION RIGHT  4/15/2024    IR LOWER EXTREMITY ANGIOGRAM RIGHT  05/25/2021    IR LOWER EXTREMITY ANGIOGRAM RIGHT  10/5/2023    IR LOWER EXTREMITY ANGIOGRAM RIGHT  3/29/2024    IR OR ANGIOGRAM  6/23/2021    IR OR ANGIOGRAM  12/28/2022    LAPAROSCOPIC CHOLECYSTECTOMY N/A 09/27/2017    Procedure: LAPAROSCOPIC CHOLECYSTECTOMY;  LAPAROSCOPIC CHOLECYSTECTOMY;  Surgeon: Jacoby Tapia MD;  Location: [facility]    LAPAROSCOPY DIAGNOSTIC (GENERAL) N/A 8/11/2021    Procedure: Exploratory laparoscopy,  laparoscopic lysis of adhesions, laparotomy;  Surgeon: Corina Ferreira MD;  Location:  OR    OR ANGIOGRAM, LOWER EXTREMITY Right 10/11/2023    Procedure: Or Angiogram, Lower Extremity;  Surgeon: Chadwick Lozano MD;  Location:  OR    ORTHOPEDIC SURGERY      left knee surgery    REPAIR ANEURYSM FEMORAL ARTERY Left 12/28/2022    Procedure: REPAIR LEFT FEMORAL PSEUDOANEURYSM;  Surgeon: Chadwick Lozano MD;  Location:  OR    VASCULAR SURGERY  aoto bi fem  left fem-AK pop    ZZC FABRIC  WRAPPING OF ABDOMINAL ANEURYSM      [facility] NONSPECIFIC PROCEDURE  12/2000    angioplasty with stent right fem. a.    [facility] NONSPECIFIC PROCEDURE  1987    sinus surgery    [facility] NONSPECIFIC PROCEDURE  09/02/2009    Emergent left groin exploration with oversewing of bleeding angiographic site. 2. Endarterectomy of common femoral-proximal superficial femoral artery with greater saphenous vein patch angioplasty.   a. [place] of accessory right greater saphenous vein.     [facility] NONSPECIFIC PROCEDURE  08/27/2009    occluded left common iliac and external iliac arteries were successfully revascularized with stenting to 8 and 7 mm        Prior to Admission Medications   Prior to Admission Medications   Prescriptions Last Dose Informant Patient Reported? Taking?   acetaminophen (TYLENOL) 500 MG tablet  Self Yes No   Sig: Take 500-1,000 mg by mouth every 6 hours as needed for mild pain   amLODIPine (NORVASC) 10 MG tablet   No No   Sig: Take 1 tablet (10 mg) by mouth daily   bisacodyl (DULCOLAX) 10 MG suppository   No No   Sig: Place 1 suppository (10 mg) rectally daily as needed for constipation   bumetanide (BUMEX) 2 MG tablet   No No   Sig: Take 2 tablets (4 mg) by mouth four times a week   carvedilol (COREG) 6.25 MG tablet   No No   Sig: Take 1 tablet (6.25 mg) by mouth 2 times daily (with meals)   cetirizine (ZYRTEC) 5 MG tablet   No No   Sig: Take 1 tablet (5 mg) by mouth daily   clopidogrel (PLAVIX) 75 MG tablet   No No   Sig: Take 1 tablet (75 mg) by mouth daily   famotidine (PEPCID) 20 MG tablet   No No   Sig: Take 1 tablet (20 mg) by mouth every 48 hours   fluticasone-vilanterol (BREO ELLIPTA) 200-25 MCG/ACT inhaler   No No   Sig: Inhale 1 puff into the lungs daily   gabapentin (NEURONTIN) 100 MG capsule   No No   Sig: Take 2 capsules (200 mg) by mouth three times a week   hydrALAZINE (APRESOLINE) 10 MG tablet   No No   Sig: Take 1 tablet (10 mg) by mouth 4 times daily as needed for high blood pressure (SBP > 185.)    lactobacillus rhamnosus, GG, (CULTURELL) capsule   No No   Sig: Take 1 capsule by mouth 2 times daily   miconazole (MICATIN) 2 % external powder   No No   Sig: Apply topically 2 times daily   multivitamin RENAL (TRIPHROCAPS) 1 capsule capsule   No No   Sig: Take 1 capsule by mouth daily   nicotine (NICODERM CQ) 14 MG/24HR 24 hr patch   No No   Sig: Place 1 patch onto the skin daily   nitroGLYcerin (NITROSTAT) 0.4 MG sublingual tablet  Self No No   Sig: Place 1 tablet (0.4 mg) under the tongue See Admin Instructions for chest pain   ondansetron (ZOFRAN ODT) 4 MG ODT tab   No No   Sig: Take 1 tablet (4 mg) by mouth every 6 hours as needed for nausea or vomiting   polyethylene glycol (MIRALAX) 17 GM/Dose powder   No No   Sig: Take 17 g by mouth daily   rosuvastatin (CRESTOR) 10 MG tablet   No No   Sig: Take 1 tablet (10 mg) by mouth at bedtime   senna-docusate (SENOKOT-S/PERICOLACE) 8.6-50 MG tablet   No No   Sig: Take 1-2 tablets by mouth 2 times daily   silver sulfADIAZINE (SILVADENE) 1 % external cream   No No   Sig: Apply topically daily      Facility-Administered Medications: None     Allergies   Allergies   Allergen Reactions    Pantoprazole      Protonix caused diffuse edema    Chantix [Varenicline]      Terrible dreams    Contrast Dye Swelling     Patient reports facial and throat swelling with prior CT contrast.    Penicillins Itching and Rash       Social History   I have reviewed this patient's social history and updated it with pertinent information if needed. Shirley Vangquin  reports that she has been smoking cigarettes. She started smoking about 56 years ago. She has a 14.1 pack-year smoking history. She has never used smokeless tobacco. She reports that she does not currently use alcohol. She reports current drug use. Drug: Marijuana.    Family History   I have reviewed this patient's family history and updated it with pertinent information if needed.   Family History   Problem Relation Age of  Onset    Cancer Mother         bladder    Cardiovascular Father         alive,multiple heart attacks    Diabetes Maternal Grandmother     Lung Cancer Maternal Grandmother     Blood Disease Brother         clotting disorder       Review of Systems   CONSTITUTIONAL:  negative  EYES:  negative  HEENT:  negative  RESPIRATORY:  negative  CARDIOVASCULAR:  negative  GASTROINTESTINAL:  positive for diarrhea and abdominal pain  GENITOURINARY:  negative  INTEGUMENT/BREAST:  negative  HEMATOLOGIC/LYMPHATIC:  negative  ALLERGIC/IMMUNOLOGIC:  negative  ENDOCRINE:  negative  MUSCULOSKELETAL:  negative  NEUROLOGICAL:  negative  BEHAVIOR/PSYCH:  negative    Physical Exam   Temp: 97.7  F (36.5  C) Temp src: Oral BP: (!) 156/85 Pulse: 66   Resp: 18 SpO2: 98 % O2 Device: None (Room air)    Vital Signs with Ranges  Temp:  [97.7  F (36.5  C)-98.2  F (36.8  C)] 97.7  F (36.5  C)  Pulse:  [64-72] 66  Resp:  [18] 18  BP: (135-178)/(60-85) 156/85  SpO2:  [94 %-98 %] 98 %  0 lbs 0 oz    Constitutional: Awake, alert, cooperative, no apparent distress.  Eyes: Conjunctiva and pupils examined and normal.  HEENT: Moist mucous membranes, normal dentition.  Respiratory: Clear to auscultation bilaterally, no crackles or wheezing.  Cardiovascular: Regular rate and rhythm, normal S1 and S2, and no murmur noted.  GI: Soft, non-distended, non-tender, normal bowel sounds.  Lymph/Hematologic: No anterior cervical or supraclavicular adenopathy.  Skin: No rashes, no cyanosis, no edema.  Musculoskeletal: No joint swelling, erythema or tenderness, left lower extremity covered with Ace wrap, right stump have dressing on, taken off, has necrotic tissue mild erythema around the wound not completely examined as dressing was still on was just changed today.  Neurologic: Cranial nerves 2-12 intact, normal strength and sensation.  Psychiatric: Alert, oriented to person, place and time, no obvious anxiety or depression.    Data   Data reviewed today:  I personally  reviewed no images or EKG's today.  Recent Labs   Lab 05/15/24  0542 05/14/24  0805 05/13/24  0706 05/10/24  0607 05/09/24  0655   WBC  --  4.0  --   --  3.2*   HGB  --  10.2*  --   --  9.7*   MCV  --  96  --   --  96   PLT  --  184  --   --  191   * 128* 129*   < > 126*   POTASSIUM 4.4 4.4 4.3   < > 4.7   CHLORIDE 99 96* 96*   < > 96*   CO2 24 25 25   < > 24   BUN 15.5 22.5 14.2   < > 17.4   CR 2.07* 3.18* 2.64*   < > 3.37*   ANIONGAP 8 7 8   < > 6*   SONIA 7.4* 7.5* 7.4*   < > 7.3*   GLC 75 90 68*   < > 75   ALBUMIN 1.8* 2.0* 1.9*   < > 1.9*   PROTTOTAL  --  4.3*  --   --   --    BILITOTAL  --  0.3  --   --   --    ALKPHOS  --  82  --   --   --    ALT  --  7  --   --   --    AST  --  16  --   --   --     < > = values in this interval not displayed.       Recent Results (from the past 24 hour(s))   XR Abdomen 1 View    Narrative    EXAM: Abdominal radiograph 5/14/2024 5:58 PM    HISTORY: 63 years Female reassess stool burden.    COMPARISON: 5/9/2024.    TECHNIQUE: Single frontal supine view(s) of the abdomen.    FINDINGS:  Partial visualization of central catheter in the right atrium. Biiliac  stents. Surgical clips overlie the right or quadrant. Embolization  coiling material in the right lower quadrant. No abnormally dilated  loops of bowel. Moderate colonic stool burden. Vascular  calcifications. Left lower lobe atelectasis/consolidation      Impression    IMPRESSION: Nonobstructive bowel gas pattern. Moderate stool burden is  mildly increased compared to the 5/9/2024 exam.    I have personally reviewed the examination and initial interpretation  and I agree with the findings.    SELENA MANSFIELD MD         SYSTEM ID:  [id number]

## 2024-05-15 NOTE — PROGRESS NOTES
[facility] Nurse Inpatient Assessment     Consulted for: Coccyx    Patient History (according to provider note(s):      Per Corrina Hurt PA-C on 4/22/2024: Shirley Hendricks is a 63 year old right hand dominant female with complicated past medical history including but not limited to PAD with multiple prior bilateral lower extremity vascular bypass grafts and thrombectomies (most recently 10/2023 right common femoral to proximal anterior tibial PTFE bypass graft), bilateral Charcot-Breonna-Tooth foot deformity, prior bilateral carotid endarterectomies, B-cell lymphoma, CAD (hx Mix3), hypertension, hyperlipidemia, GI bleed (12/2023), type 2 diabetes mellitus, COPD, tobacco use disorder, iron deficiency anemia, GERD, Celiac disease, Takotsubo cardiomyopathy, SVT, depression/anxiety and spongiotic dermatitis who was admitted on 3/29/24 with acute occlusion of right lower extremity arterial bypass graft now s/p right above-knee amputation 4/1/24, completed 4/9/24 with hospital course complicated by acute renal failure now on dialysis, sepsis, acute blood loss anemia, acute post-operative pain, nausea, loose stools, delirium, malnutrition, and multiple electrolyte derangements.  She is now admitted to ARU on 4/22/24 for multidisciplinary rehabilitation and ongoing medical management.        Admission to acute inpatient rehab 04/22/24.      Assessment:      Areas visualized during today's visit: Coccyx, buttocks    Pressure Injury Location: Sacroccocygeal      4/22    5/15  Last photo: 5/15/24  Wound type: Pressure Injury, Incontinence Associated Dermatitis (IAD), and Moisture Associated Skin Damage (MASD)     Pressure Injury Stage: Unstageable, present on admission (from recent admission at [facility]).      Wound history/plan of care:  MASD related to incontinence of bladder and bowels. intergluteal crease with area of linear erythema, skin is intact. This line of erythema continues  "up gluteal crease to sacral area and evolves to a localized round area of erythema that is blanchable. To the center of this area is an open pressure related injury stage 2 vs 3, unable to determine staging at this time, area is positional over bone and within skin crevice, very little adipose tissue to this area. Base of wound with dermis to edges and yellow tissue. Patient found with brief on in bed, discussed indication for brief use, she agreed to remove due to \" I don't even want to use them but I can't get up because of my leg\". Patient has Right AKA, discussed use of bedpan and/or transferring to commode if able, she was agreeable to try this plan.   4/30: pt prefers to have mepilex in place vs paste for when he is down at therapy. Will change orders and re-evaluate at next visit. Spent much time discussing importance of repositioning q2h   5/6: Wound is undermining, indicating a full thickness wound. Base is quite small with mostly slough at the base and granulation tissue in the undermining aspects.     Wound base: 100 % slough in center, granulation tissue at edges     Palpation of the wound bed: normal      Drainage: none     Description of drainage: none     Measurements (length x width x depth, in cm) 1 x 0.5  x  0.1 cm, undermining 0.2 cm circumferentially     Periwound skin: Intact and Erythema- blanchable      Color: normal and consistent with surrounding tissue      Temperature: normal   Odor: none  Pain: denies , none  Pain intervention prior to dressing change: patient tolerated well, no significant pain present , soaking, and slow and gentle cares   Treatment goal: Heal , Infection control/prevention, Maintain (prevention of deterioration), Protection, and Promote epidermal migration  STATUS: stable - slowly improving  Supplies ordered: at bedside, supplies stored on unit, discussed with RN, and discussed with patient     My PI Risk Assessment     Sensory Perception: 4 - No impairment     " "Moisture: 3 - Occasionally moist      Activity: 2 - Chairfast     Mobility: 3 - Slightly limited      Nutrition: 3 - Adequate     Friction/Shear: 1 - Problem     TOTAL: 16      Treatment Plan:     Sacral wound(s): Every 3 days and PRN if soiled  Cleanse the area with NS and pat dry.  Apply No sting film barrier to periwound skin.  Cover wound with Sacral Mepilex (#609047)- fold like a taco with pointed end towards head for best fit  Ensure pt has Ritchie-cushion (#340321) while sitting up in the chair.  Use only one Covidien pad in between mattress and pt.   No brief while in bed.  Add Low air loss pump to Isoflex support surface and rotate side to side when in bed  Strict PIP measures  FYI- If pt has constant incontinent loose stools needing dressing changes Q shift please discontinue the Mepilex dressing and apply criticaid barrier paste BID and PRN.      Pressure Injury Prevention (PIP) Plan:  If patient is declining pressure injury prevention interventions: Explore reason why and address patient's concerns, Educate on pressure injury risk and prevention intervention(s), If patient is still declining, document \"informed refusal\" , and Ensure Care team is aware ( provider, charge nurse, etc)  Mattress: Follow bed algorithm, reassess daily and order specialty mattress, if indicated.  HOB: Maintain at or below 30 degrees, unless contraindicated  Repositioning in bed: Every 1-2 hours , Left/right positioning; avoid supine, Raise foot of bed prior to raising head of bed, to reduce patient sliding down (shear), and Frequent microturns using TAPS wedges, as patient tolerates  Heels: Keep elevated off mattress and Pillows under calves  Protective Dressing: Sacral Mepilex for prevention (#801685),  especially for the agitated patient   Positioning Equipment: TAPS wedges (#368565) to help maintain 30 degree side lying position   Chair positioning: Chair cushion (#479711) , Assist patient to reposition hourly, and Do NOT use a " donut for sitting (this increases pressure to smaller area and creates a higher potential for injury)    If patient has a buttock pressure injury, or high risk for PI use chair cushion or SPS.  Moisture Management: Perineal cleansing /protection: Follow Incontinence Protocol, Avoid brief in bed, Clean and dry skin folds with bathing , Consider InterDry (#070328) between folds, and Moisturize dry skin  Under Devices: Inspect skin under all medical devices during skin inspection , Ensure tubes are stabilized without tension, and Ensure patient is not lying on medical devices or equipment when repositioned  Ask provider to discontinue device when no longer needed.      Orders: Reviewed    RECOMMEND PRIMARY TEAM ORDER: None, at this time  Education provided: importance of repositioning, plan of care, wound progress, Infection prevention , Moisture management, Hygiene, and Off-loading pressure  Discussed plan of care with: Patient and Nurse  WOC nurse follow-up plan: twice weekly  Notify WOC if wound(s) deteriorate.  Nursing to notify the Provider(s) and re-consult the WOC Nurse if new skin concern.    DATA:     Current support surface: Standard  Standard gel/foam mattress (IsoFlex, Atmos air, etc)  Containment of urine/stool: Incontinence Protocol, Brief, Incontinent pad in bed, and recommending NICOLE pump be added to isolflex support surface for moisture management  BMI: Body mass index is 23.76 kg/m .   Active diet order: Orders Placed This Encounter      Gluten Free Diet      NPO per Anesthesia Guidelines for Procedure/Surgery Except for: Meds      Diet     Output: I/O last 3 completed shifts:  In: 790 [P.O.:790]  Out: 2000 [Other:2000]     Labs:   Recent Labs   Lab 05/15/24  0542 05/14/24  0805   ALBUMIN 1.8* 2.0*   HGB  --  10.2*   WBC  --  4.0     Pressure injury risk assessment:   Sensory Perception: 4-->no impairment  Moisture: 3-->occasionally moist  Activity: 2-->chairfast  Mobility: 3-->slightly  limited  Nutrition: 3-->adequate  Friction and Shear: 2-->potential problem  Cesar Score: 17      Xenia Pelayo RN BSN CWOCN  Available via [employer] baljinder: [facility]  Office *[phone]

## 2024-05-15 NOTE — PROGRESS NOTES
"  [facility]   Acute Rehabilitation Unit  Daily progress note    INTERVAL HISTORY  Shirley Hendricks was seen and examined at bedside this morning.  No acute events reported overnight.  She is quite frustrated and upset this morning, primarily in regards to ongoing bowel issues.  She is \"sick of\" constant loose/incontinent stools.  Feels she is losing all her fluids through her stools and then is restricted.  Reviewed the results of her xray and discussed that we seemed to go the wrong way after her not taking bowel meds as ordered for several days.  She was planning to do suppository after her therapies but is worried about having to spend her whole evening on the toilet.  She is also struggling with restrictions in her diet and fluid intake, despite re-education about the reasoning for these.  She also feels the intensity of therapy here is too much for her, feels the schedule has been quite demanding.  Provided empathic listening.  Also reviewed plans with vascular surgery team for procedure tomorrow.  Waiting to hear on timing of her transfer back to [facility] for this.    With therapies, she is currently mod I for bed mobility, mod I for slideboard bed<>wheelchair, min A for commode transfers.      MEDICATIONS  Current Facility-Administered Medications   Medication Dose Route Frequency Provider Last Rate Last Admin    amLODIPine (NORVASC) tablet 10 mg  10 mg Oral Daily Corrina Hurt PA-C   10 mg at 05/14/24 1033    bumetanide (BUMEX) tablet 4 mg  4 mg Oral Once per day on Sunday Monday Wednesday Friday Corrina Hurt PA-C   4 mg at 05/13/24 1010    carvedilol (COREG) tablet 6.25 mg  6.25 mg Oral BID w/meals Corrina Hurt PA-C   6.25 mg at 05/14/24 1613    cetirizine (zyrTEC) tablet 5 mg  5 mg Oral Daily Corrina Hurt PA-C   5 mg at 05/14/24 1031    clopidogrel (PLAVIX) tablet 75 mg  75 mg Oral Daily Corrina Hurt PA-C   75 mg at " 05/14/24 1032    famotidine (PEPCID) tablet 20 mg  20 mg Oral Q48H Corrina Hurt PA-C   20 mg at 05/14/24 1613    fluticasone-vilanterol (BREO ELLIPTA) 200-25 MCG/ACT inhaler 1 puff  1 puff Inhalation Daily Corrina Hurt PA-C   1 puff at 05/14/24 1029    gabapentin (NEURONTIN) capsule 200 mg  200 mg Oral Once per day on Tuesday Thursday Saturday Corrina Hurt PA-C   200 mg at 05/14/24 2052    heparin ANTICOAGULANT injection 5,000 Units  5,000 Units Subcutaneous Q12H Corrina Hurt PA-C   5,000 Units at 05/14/24 2045    lactobacillus rhamnosus (GG) (CULTURELL) capsule 1 capsule  1 capsule Oral BID Husam Coulter MD   1 capsule at 05/14/24 2044    miconazole (MICATIN) 2 % powder   Topical BID Corrina Hurt PA-C   Given at 05/14/24 2054    multivitamin RENAL (TRIPHROCAPS) capsule 1 capsule  1 capsule Oral Daily Corrina Hurt PA-C   1 capsule at 05/12/24 1227    nicotine (NICODERM CQ) 14 MG/24HR 24 hr patch 1 patch  1 patch Transdermal Daily Corrina Hurt PA-C   1 patch at 05/14/24 1034    polyethylene glycol (MIRALAX) Packet 17 g  17 g Oral Daily Corrina Hurt PA-C   17 g at 05/14/24 1029    rosuvastatin (CRESTOR) tablet 10 mg  10 mg Oral At Bedtime Corrina Hurt PA-C   10 mg at 05/14/24 2045    senna-docusate (SENOKOT-S/PERICOLACE) 8.6-50 MG per tablet 1-2 tablet  1-2 tablet Oral BID Corrina Hurt PA-C   1 tablet at 05/14/24 2045    silver sulfADIAZINE (SILVADENE) 1 % cream   Topical Daily Gala Lo MD   Given at 05/14/24 1035    sodium chloride (PF) 0.9% PF flush 9 mL  9 mL Intracatheter During Dialysis/CRRT (from stock) Vineet Hodges MD              Current Facility-Administered Medications   Medication Dose Route Frequency Provider Last Rate Last Admin    acetaminophen (TYLENOL) tablet 500-1,000 mg  500-1,000 mg Oral Q6H PRN Corrina Hurt PA-C   1,000 mg at 05/14/24 2044    bisacodyl (DULCOLAX) suppository 10 mg   "10 mg Rectal Daily PRN Artie Alvarado MD        [Held by provider] diphenoxylate-atropine (LOMOTIL) 2.5-0.025 MG per tablet 1 tablet  1 tablet Oral 4x Daily PRN Corrina Hurt PA-C   1 tablet at 04/30/24 1538    hydrALAZINE (APRESOLINE) tablet 10 mg  10 mg Oral 4x Daily PRN Artie Alvarado MD        HYDROmorphone (DILAUDID) tablet 2 mg  2 mg Oral Q6H PRN Corrina Hurt PA-C   2 mg at 05/07/24 2134    naloxone (NARCAN) injection 0.2 mg  0.2 mg Intravenous Q2 Min PRN Arun Pimentel MD        Or    naloxone (NARCAN) injection 0.4 mg  0.4 mg Intravenous Q2 Min PRN Arun Pimentel MD        Or    naloxone (NARCAN) injection 0.2 mg  0.2 mg Intramuscular Q2 Min PRN Arun Pimentel MD        Or    naloxone (NARCAN) injection 0.4 mg  0.4 mg Intramuscular Q2 Min PRN Arun Pimentel MD        nitroGLYcerin (NITROSTAT) sublingual tablet 0.4 mg  0.4 mg Sublingual Q5 Min PRN Corrina Hurt PA-C        ondansetron (ZOFRAN ODT) ODT tab 4 mg  4 mg Oral Q6H PRN Corrina Hurt PA-C   4 mg at 05/14/24 2058    polyethylene glycol (MIRALAX) powder 17 g  17 g Oral Daily PRN Corrina Hurt PA-C            PHYSICAL EXAM  BP (!) 148/60 (BP Location: Left arm)   Pulse 72   Temp 98.2  F (36.8  C) (Oral)   Resp 18   Ht 1.549 m (5' 0.98\")   Wt 57 kg (125 lb 10.6 oz)   LMP  (LMP Unknown)   SpO2 96%   BMI 23.76 kg/m    Gen: NAD, laying in bed  HEENT: NC/AT, MMM, EOMI, PERRLA  Cardio: RRR, no murmurs  Pulm: non-labored on room air, lungs CTA bilaterally  Abd: soft, +tender with deep palpation of R abdomen (both upper and lower quadrants) and some epigastric tenderness as well, non-distended, active bowel sounds  Ext: some edema in RLE; charcot foot deformity on left; R AKA (incision not visualized on this date)  Neuro/MSK: Awake, alert, moving all extremities actively in bed      LABS  CBC RESULTS:   Recent Labs   Lab Test 05/14/24  0805 05/09/24  0655 05/07/24  0553   WBC 4.0 3.2* 3.3*   RBC 3.38* 3.20* 3.13*   HGB 10.2* 9.7* " 9.4*   HCT 32.4* 30.8* 29.4*   MCV 96 96 94   MCH 30.2 30.3 30.0   MCHC 31.5 31.5 32.0   RDW 17.6* 17.6* 17.5*    191 202       Last Basic Metabolic Panel:  Recent Labs   Lab Test 05/15/24  0542 05/14/24  0805 05/13/24  0706   * 128* 129*   POTASSIUM 4.4 4.4 4.3   CHLORIDE 99 96* 96*   CO2 24 25 25   ANIONGAP 8 7 8   GLC 75 90 68*   BUN 15.5 22.5 14.2   CR 2.07* 3.18* 2.64*   GFRESTIMATED 26* 16* 19*   SONIA 7.4* 7.5* 7.4*         Rehabilitation - continue comprehensive acute inpatient rehabilitation program with multidisciplinary approach including therapies, rehab nursing, and physiatry following. See interval history for updates.      ASSESSMENT AND PLAN  Shirley Hendricks is a 63 year old right hand dominant female with complicated past medical history including but not limited to PAD with multiple prior bilateral lower extremity vascular bypass grafts and thrombectomies (most recently 10/2023 right common femoral to proximal anterior tibial PTFE bypass graft), bilateral Charcot-Breonna-Tooth foot deformity, prior bilateral carotid endarterectomies, B-cell lymphoma, CAD (hx Mix3), hypertension, hyperlipidemia, GI bleed (12/2023), type 2 diabetes mellitus, COPD, tobacco use disorder, iron deficiency anemia, GERD, Celiac disease, Takotsubo cardiomyopathy, SVT, depression/anxiety and spongiotic dermatitis who was admitted on 3/29/24 with acute occlusion of right lower extremity arterial bypass graft now s/p right above-knee amputation 4/1/24, completed 4/9/24 with hospital course complicated by acute renal failure now on dialysis, sepsis, acute blood loss anemia, acute post-operative pain, nausea, loose stools, delirium, malnutrition, and multiple electrolyte derangements.  She is now admitted to ARU on 4/22/24 for multidisciplinary rehabilitation and ongoing medical management.        Admission to acute inpatient rehab 04/22/24.    Impairment group code: Amputation 05.3 Unilateral LE AKA; emergent R  AKA due to acute occlusion of RLE bypass graft         PT and OT 90 minutes of each daily for 6 days per week in addition to rehab nursing and close management of physiatrist.       Impairment of ADL's: Noted to have impaired activity tolerance, impaired balance, impaired strength, impaired weight shifting, and pain, all affecting her ability to safely and independently perform basic ADLs.  Goal for mod I with basic wheelchair-based ADLs with assist for bathing, as well as assist IADLs/heavier activities.     Impairment of mobility:  Noted to have impaired activity tolerance, impaired balance, impaired strength, impaired weight shifting, and pain, all affecting her ability to safely and independently perform basic mobility.  Goal for mod I with basic wheelchair-based mobility.     Impairment of cognition/language/swallow:  Noted to have dysphagia with goals for safe tolerance of least restrictive diet.  However, IP SLP noting did not anticipate further SLP services at ARU, no issues with tolerating regular diet.  Will not consult initially.  If any concern for difficulty tolerating current diet, can consult.     Medical Conditions  New actions/orders/updates for today are in blue.     S/p right AKA 4/1/24, completed 4/9/24 due to critical limb ischemia, acute occlusion of right common femoral artery to mid anterior tibial artery bypass graft   PAD/PVD with hx multiple prior vascular interventions to BLE  Bilateral Charcot-Breonna-Tooth foot deformities  Acute post-op pain  - Wound care: daily dressing changes to right AKA with xeroform and gauze with kerlix to keep wound protected  - Dehiscence of lateral surgical incision with some increased serosanguinous drainage.  Also with multiple areas with necrotic appearance.  Overall similar to day of ARU admission although with more drainage.  Vascular surgery consulted/assessed on 4/28.  Appreciate assist.  Recommended ongoing daily dressing changes with Xeroform, gauze  fluffs, loosely wrapped Kerlix.  DO NOT use ace wrap.  Stockinette can be placed into over-the-shoulder sling to prevent right AKA dressing from falling off during therapies.  Silvadene topical also added per vascular surgery on 4/29, to be applied daily to incision.  Some increased erythema noted at surgical site on 5/2.  Vascular surgery reassessed 5/3 and felt overall appearance not concerning.  Per vascular surgery, plan for irrigation and debridement in [facility] tomorrow.  No bed currently available, may transfer later today vs tomorrow for the procedure pending timing.  Will be NPO at midnight.  Further pre-op orders to be entered by vascular surgery.  - Also with significant itching at surgical site.  Recently seen by derm for dermatitis as below and recommended to trial zyrtec or claritin for itching.  Started zyrtec 5 mg daily on 4/25.  - NWB RLE  - Continue PTA L foot custom orthotic   - Continue Plavix 75 mg daily (given hx left bypass).  No ongoing need for Xarelto per vascular (no recent DVT or PE)  - Pain management: APAP 500-1000 mg q6h PRN, gabapentin 200 mg 3x/week after HD (renally dosed).   - Continue PT/OT  - Follow up with vascular surgery in 2-4 weeks (~5/28)     Acute blood loss anemia on anemia of chronic disease, hx iron deficiency  Retroperitoneal hematoma  Recent GI bleed (hospitalized 12/2023)  Required intermittent transfusion during this admission (3/30, 3/31, 4/2, 4/6, 4/9, 4/13).  Repeat CT abdomen on 4/20 with stable right retroperitoneal hematoma; no s/o active bleed.  5/14: Hgb stable at 10.2  - Repeat CT abd/pelvis on 5/2 demonstrated decreased small right retroperitoneal hematoma (iatrogenic)  - Continue epo/venofer with HD per nephrology  - Trend CBC every T/Th/Sat  - Transfuse for Hgb <7     Acute renal failure on HD  Hyponatremia  Normal baseline Cr, though per nephrology, suspect some modest CKD.  LIMA this admission, up to peak Cr 4/22 on 4/3.  Likely BAILEY +/- rhabdo  +/- ischemic injury with no signs of recovery and now dialysis dependent (started 4/3/24).  S/p tunneled dialysis catheter placement 4/3/24.  5/15: Cr 2.07, Na improved at 131  - Nephrology consulted, appreciate ongoing assistance  - HD T/Th/Sat at ARU or per nephrology recs.  Plan [facility]Th[facility]Sat  - S/p diuretic challenge (Lasix 100 mg PO) on 4/29 per nephrology without improved output.  Evidence of fluid overload on CT abd/pelv 5/2 with mod pleural effusions, small volume ascites, diffuse soft tissue anasarca.  Per nephrology, recommended Bumex 4 mg daily on non-dialysis days, started on 5/6.  - Daily renal panel for now per nephrology  - Continue 1.2L fluid restriction  - Avoid NSAIDs/nephrotoxins, renally dose medications     Bilateral pleural effusion  Noted on CT abdomen 4/20 with moderate b/l pleural effusion (left>right).  Has been intermittently requiring 1-2L supplemental oxygen.  Unclear if related to volume given new renal failure on HD, also baseline COPD.  Repeat CT abd/pelv on 5/2 with moderate pleural effusions  - Monitor respiratory status, supplemental O2 by NC PRN to maintain sats >88%  - Consider thoracentesis if symptomatic or worsening hypoxia      Celiac disease  Colon distension   Loose stools, improving  Nausea/vomiting, improving  Severe malnutrition in context of acute on chronic illness  Pill cam study with [facility] in 3/2024 (due to recent GI bleed), which revealed mild non-erosive red tissue in the duodenum, some atrophic type appearance that could be celiac disease.  This was followed by celiac labs on 3/20/24 (Gliadin ab and tTG IgA antibody) which were positive and thus Celiac diagnosis was established and she was recommended for gluten-free diet.  This admission, noted to have colonic distension and circumferential wall thickening of the distal colon and rectum concerning for proctocolitis on CT abdomen.  Also with loose stools, nausea.  Despite this, patient  "declined gluten-free diet (switched on 4/18).  Noted to have improvement in loose stools and nausea at discharge to ARU per sending team.  However, has persisted with loose stools, intermittent nausea and vomiting.  Repeat cdiff on 4/28 negative.  Suspect symptoms are due to Celiac disease and non-compliance with gluten-free diet.  Patient agreeable to trial of gluten-free diet started on 4/28.  Due to recurrent nausea/emesis, ongoing loose stools, + new rebound/guarding in right abdomen, CT abdomen/pelvis obtained on 5/2 which showed \"persistent wall thickening of the descending and sigmoid colon in addition to the rectum, similar to prior study. This may also be due  to overall third spacing of fluid versus colitis given diarrhea history. Large volume of stool.\"  Loose stools could represent obstructive diarrhea.  Lomotil held, scopolamine stopped.  Started on bowel protocol.  Improvement in abdominal pain, nausea, vomiting.  Ongoing loose stools but improving in consistency per patient, frequency varies and continues to cause incontinence.  AXR with nonobstructive bowel gas pattern, moderate colonic stool burden, overall not significantly changed.  S/p enema on 5/6 with several subsequent loose Bms.  Daily Miralax added 5/7.  Repeat AXR with improvement.  - Continue gluten-free diet  - RD consulted, appreciate assistance  - Has not been taking most of her recent doses of senokot-S and Miralax.  Still with loose/small/incontinent stools and increased nausea/emesis.  Repeat AXR on 5/14 with mildly increased moderate stool burden, non-obstructive bowel gas pattern.  Did take Miralax and both doses of senokot-S yesterday.  Plan for suppository today.  May benefit from GI involvement post-op if ongoing issues.  - Continue senokot-S 1-2 tabs BID scheduled.   - Continue Miralax daily.   - PRN Zofran  - Continue probiotics BID  - Monitor symptoms  - Follow up with [facility] as outpatient     Abnormal UA  Given ongoing " nausea/vomiting and abdominal/pelvic pain, as well as bladder wall thickening on CT, UA obtained to rule out UTI.   Results with large proteinuria, negative nitrites, small hematuria, large leuk esterase, many bacteria.  UC growing >100k colonies candida and 10-50k colonies of E. Coli.  Started on levofloxacin.  - Continue levofloxacin 500 mg every other day (thru 5/12)  - No clear urinary symptoms.     Hypomagnesemia  Replaced mag IV intermittently.  5/14: mag WNL at 1.8  - Continue to trend     Hypocalcemia  5/15: Ca corrects to 8.8 (low end of normal) for hypoalbuminemia.    - Per pharmacy, was on PTA calcium carbonate/vit D PTA (had run out 1 week before admission), not ordered while at hospital.  Have previously reached out to nephrology to ask about resuming supplement but without response.  - Continue to trend     CAD (hx MI x3)  HTN  Hyperlipidemia  Hx Takotsubo CM  Hx SVT  Last coronary angiogram completed 10/2023.  Recovered EF (55-60%) from ECHO 11/2023.  - Continue PTA Coreg 6.25 mg BID  - Continue amlodipine 10 mg daily (increased 5/8 per nephrology)  - Continue Bumex 4 mg on non-dialysis days (added 5/6 per nephrology)  - Hold PTA lisinopril 10 mg daily due to renal failure  - PTA Jardiance 10 mg daily discontinued due to renal failure  - Rosuvastatin 10 mg daily resumed on ARU admission (previously held due to concern for rhabdo).  5/7: repeat hepatic panel WNL (with exception of low total protein)    - Monitor BP  - Was to follow up with cardiology in Feb 2024, should be scheduled after discharge     Murmur  Noted on exam this admission by attending physiatrist.  Per chart review, intermittently documented in the past with questionable/subtle murmur (though not by cardiology).  Most recent echo 11/7/23 with trace mitral regurg, trace tricuspid regurg, and moderate trileaflet aortic sclerosis.  - Folllow up with cardiology as above     Hx bilateral carotid artery stenosis s/p bilateral carotid  endarterectomies  - Follow up with vascular surgery for annual carotid duplex (last done on 1/4/24 with stable stenosis of right carotid bulb)     Unclear hx Diabetes mellitus, type II  Listed as diagnosis per chart review.  However, family reports that patient has never been diagnosed with diabetes and no A1c that is available in her chart reflects diabetic range.  A1c this admission 5.2%.  PTA Jardiance and gabapentin discontinued due to acute renal failure.  BG well-controlled during this admission without need for insulin.  - Monitor BG periodically with labs     B-cell lymphoma, stage VALENTIN  Followed by [facility] (Dr. Barrow).  Mild radiographic progression on CT 1/26/24.  Given asymptomatic, plans at that time for ongoing CT monitoring.  - Monitor for development of any B symptoms  - Trend CBC  - Follow up with oncology, repeat CT as planned in 7/2024     COPD  Tobacco use disorder  PTA smoking ~5 cigarettes per day  - Monitor respiratory status, supplemental O2 by NC PRN to maintain sats >88%  - Continue PTA Breo Ellipta  - Nicotine patch 14 mcg/day  - Ongoing education/resources for cessation     Adjustment disorder with mixed mood  Hx MDD/anxiety (per chart though patient denies)  Delirium, resolved  Not on medications PTA.  Patient denies any hx depression/anxiety but does note depressed mood in setting of recent AKA and significant home stressors.  - Psychology and spiritual services consulted, appreciate assist  - Monitor mood     GERD  PTA on omeprazole 20 mg daily  - Continue pepcid 20 mg q48 hours (renally dosed) given allergy to pantoprazole (formulary sub for omeprazole)     Spongiotic dermatitis  Recently seen by OP dermatology, recommended betamethasone cream BID PRN, not using regularly at hospital  - Consider resumption of topical steroids if recurrent symptoms  - Started Zyrtec 4/25 as above for itching near surgical site     Sacral wound: pressure injury (stage 2 vs 3),  incontinence-associated dermatitis, moisture-associated skin damage  Assessed by WOCN on 4/22 at [facility] hospital.  - WOCN consulted for ongoing follow-up at ARU  - Wound care per WOCN orders  - Pressure reduction strategies     Endometrial thickening  Noted incidentally on CT abdomen/pelvis.  Patient denies postmenopausal bleeding.  - PCP to follow-up, consider outpatient pelvic ultrasound to better assess     Adjustment to disability:  Clinical psychology to eval and treat if indicated  FEN: gluten free diet, 1200 mL fluid restriction  Bowel: incontinent, recent loose stools as above  Bladder: incontinent  DVT Prophylaxis: subcutaneous heparin  GI Prophylaxis: pepcid  Code: full, confirmed on admission  Disposition: discharge back to [facility] hospital for OR  ELOS: 5/15 vs 5/16 pending bed availability  Follow up Appointments on Discharge: PCP in 1-2 weeks, vascular surgery in 2 weeks, [facility], nephrology (ongoing HD), heme/onc ([facility], 7/2024)      35 minutes spent on the date of service doing chart review, history and exam, documentation, and further activities as noted above.    Plan of care was discussed with Dr. Arun Pimentel, PM&R staff physician and vascular surgery team    Corrina Hurt PA-C  Physical Medicine & Rehabilitation

## 2024-05-15 NOTE — PLAN OF CARE
Discharge Planner Post-Acute Rehab OT:      Discharge Plan: home with assist and HH OT or TCU     Precautions: fall, NWB of RLE, dialysis, sacral wound, elevate residual limb when in bed     Current Status:  ADLs:  Mobility: mod I for slide board to WC from bed  Grooming: IND seated at sink in w/c  Dressing: UB: IND, LB: IND in supine, IND for brace and shoe  Bathing: TBD  Toileting: SB to commode Ax1, assit for lydia cares and clothing depending on continence  IADLs: Pt does most IADL including caregiving for .   Vision/Cognition: intact     Assessment: AM session focused on problem solving SB management in new/personal wc. Difficulty with weight shifting and removal. PM- focused on BUE exercises. Improved UE strength.  Other Barriers to Discharge (DME, Family Training, etc): DME, family training, pt has support from multiple family members and will need to schedule family training since she will most likely still require assist with toileting.     5/4 Pt states her H is staying with his sister and she hopes this will con't.  She reports her brother will help her but she would like to be IND with toileting as he is not likely to assist her with toileting.

## 2024-05-15 NOTE — PLAN OF CARE
DISCHARGE SUMMARY    Pt discharging to: [facility]  Transportation: Stretcher with [facility] transport.   AVS given and discussed: Yes, paperwork and report given to EMS.  Medications given: N/A  Belongings returned: Yes, belongings stayed w/ pt during the stay.   - Personal slideboard was placed on top of stretcher near AKA site by RN  - Personal blanket was placed over pts legs by RN  - 2 belonging bags were placed under stretcher by EMS.    Comments: Pt was safely transported via stretcher w/ EMS without complication or concerns off the unit.    Pt pharmacy box was emptied and items were placed in return to pharmacy bin.       Discharged by: Soumya Mehta RN, BSN, PHN

## 2024-05-15 NOTE — PROVIDER NOTIFICATION
MD Notification    Notified Person: MD    Notified Person Name: Dr. Robledo    Notification Date/Time: 1740, 5/15/24    Notification Interaction: page    Purpose of Notification: Pt is in her room now settled, can she have a diet and code order placed please     Orders Received:    Comments:

## 2024-05-15 NOTE — PROGRESS NOTES
JUANITA notified by PA that pt will return to [facility] today for wound debridement (procedure scheduled tomorrow, 5/16). JUANITA spoke with pt's sister, Yue, to provide an update on pending TCUs.     JUANITA provided an update to [facility] to cancel pt's home care referral at this time. JUANITA called pt's [facility] and left a voicemail providing an update on current discharge plan following pt's wound debridement.    JUANITA will provide hand off to [facility]. Pt will need an OP psychology consult placed at discharge.  ---------------------------------------------------------------  Outpatient Dialysis:   [facility] [place]  [address] [place] [address]   PH: [phone]   Fax: [phone]   Chair Schedule: T, Th, Sat  Arrive by: 10:30am  Start time: 11:15am     [facility] Care Coordinator  Shavonne Bang   PH: [phone]    Pending:  [facility] (PH: [phone])  5/9: referral sent  5/10: Resent per request  5/13: JUANITA spoke with Maggie in admissions. No female beds today or tomorrow. JUANITA will follow up on Wednesday if bed is still needed  5/15: JUANITA spoke with Maggie in admissions. No beds available this week    [facility] [address] (PH: [phone])  5/9: referral sent  5/10: LV  5/13: JUANITA spoke with Diane in admissions, referral is still being reviewed.  5/14: DON needs to review referral. Considering.  5/15: KOSTA still reviewing    DAWN [facility]  Post Acute Float   [facility]U/TCU/[facility]    Phone: [phone]  Fax: [phone]

## 2024-05-15 NOTE — DISCHARGE SUMMARY
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   Weight:       Height:           Gen: NAD, laying in bed  HEENT: NC/AT, MMM, EOMI, PERRLA  Cardio: RRR, no murmurs  Pulm: non-labored on room air, lungs CTA bilaterally  Abd: soft, +tender with deep palpation of R abdomen (both upper and lower quadrants) and some epigastric tenderness as well, non-distended, active bowel sounds  Ext: some edema in RLE; charcot foot deformity on left; R AKA (incision not visualized on this date)  Neuro/MSK: Awake, alert, moving all extremities actively in bed       45 minutes spent in discharge, including >50% in counseling and coordination of care, medication review and plan of care recommended on follow up.     Discharge summary was forwarded to Vasquez Benoit (PCP) at the time of discharge, so as to bridge from hospital to outpatient care.     It was our pleasure to care for Shirley Hendricks during this hospitalization. Please do not hesitate to contact me should there be questions regarding the hospital course or discharge plan.        Corrina Hurt PA-C  Physical Medicine & Rehabilitation

## 2024-05-15 NOTE — PLAN OF CARE
"Goal Outcome Evaluation:      Plan of Care Reviewed With: patient    Overall Patient Progress: no change    Outcome Evaluation: Pt reports pain managed this shift. using call light to make needs known.    Upon return from HD noted elevated B/P; asymptomatic. Admin sched coreg and reassessed. B/P continued to be elevated; asymptomatic. Updated oncall provider and ordered prn with SBP >185. B/P remained under parameter for prn. B/p at HS WNL. Family brought in meal this evening and pt ate 100%. Education on fluid restriction provided.     Abdominal xray completed this shift; oncall paged regarding results. Additional senna admin. Patient made comment stating that \"I'm ready to give up. This is becoming too much.\" Pt to have supp in AM; patient notified. BMP 5/15.    Intermittent nausea; prn zofran admin. No emesis noted and zofran effective.    Patient tolerated R AKA after prn tylenol admin.       "

## 2024-05-15 NOTE — CONSULTS
Vascular Surgery Consult  May 15, 2024    Shirley Hendricks  : 1960    Date of Service: 5/15/2024 5:29 PM    Reason for Consult:     Assessment and Plan:  Shirley Hendricks is a 63 year old female well-known to vascular surgery service with extensive vascular surgical history, notable for recent numerous redo bypass procedures of RLE for CLTI.  Unfortunately at the end of March of this year patient had acute occlusion of her right femoral to AT bypass graft, with onset of acute limb ischemia.  This was unable to be opened with thrombolysis, and she unfortunately had several complications from thrombolytic therapy.  She also developed sepsis from ongoing ischemia of her leg, thus [facility] , second closure .  She was discharged to ARU .  Patient developed wound dehiscence of her right AKA , as well as worsening necrosis of anterior aspect over the past several weeks. Given appearance of wounds recommended transfer to [facility] for debridement in the OR, currently planned for tomorrow .    - NPO @ [place]  -Will order arterial duplex of RLE to evaluate if profundus patent, this is okay to be done tomorrow morning.   - Plan for OR tomorrow afternoon for debridement RLE AKA, possible wound VAC placement      Discussed with Dr. Enciso, who is in agreement with the above    Bj Lo MD  Surgery PGY3    History of Present Illness:    Shirley Hendricks is a 63 year old female well-known to vascular surgery service with extensive vascular surgical history, notable for recent numerous redo bypass procedures of RLE for CLTI.  Unfortunately at the end of March of this year patient had acute occlusion of her right femoral to AT bypass graft, with onset of acute limb ischemia.  This was unable to be opened with thrombolysis, and she unfortunately had several complications from thrombolytic therapy.  She also developed sepsis from  ongoing ischemia of her leg, thus she underwent right above-knee amputation guillotine with Dr. Hrenandez on April 1, second closure April 9.  She was discharged to ARU April 22.     She was noted to have concern for dehiscence of wound as well as ongoing bruising of the right AKA stump on 4/29/2024, however on review of images from ARU recently, noted to have significant worsening with necrosis of area previously bruised, as well as mildly worse dehiscence.     Nel reports she is pleased to have left the ARU, she felt it was a very demanding schedule for her.  Also has had ongoing issues while there with nausea vomiting stool incontinence loose stools and electrolyte derangements.  She also was recently started on hemodialysis has been adjusting to this.     Note development of pressure ulcer as well.     Discussed patient in course with patient's daughter as well who is on the phone at time of visit.     She shared concerns regarding her mother's cognitive abilities at this time, feels that she has not been remembering regular conversations and that she is still experiencing intermittent delirium.     Shirley denies fever and chills, however does report ongoing issues with diarrhea and vomiting.         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 clot    Post-menopausal     Reflux esophagitis 02/2004    EGD: esophagitis and medium HH    SBO (small bowel obstruction) (H) 08/10/2021    SVT (supraventricular tachycardia) (H24)     Thrombocytopenia (H24)     Uncomplicated asthma     Vitamin C deficiency 08/12/2018         Medications:  No current outpatient medications on file.       Allergies:     Allergies   Allergen Reactions    Pantoprazole      Protonix caused diffuse edema    Chantix [Varenicline]      Terrible dreams    Contrast Dye Swelling     Patient reports facial and throat swelling with prior CT contrast.    Penicillins Itching and Rash       Review of Symptoms:  A 10 point review of symptoms has been conducted and is negative except for that mentioned in the above HPI.    Physical Exam:    Blood pressure (!) 156/85, pulse 66, temperature 97.7  F (36.5  C), temperature source Oral, resp. rate 18, SpO2 98%, not currently breastfeeding.  Gen:    Lying in bed in NAD, A&OX3  HEENT: Normocephalic and atraumatic  CV:  RRR per radial pulse  Pulm:  Non-labored cirilo rate thing on room air  Ext:  RLE with AKA.  Incision has dehisced approximately 2 mm wide along entire length.  On anterior aspect of thigh patient has large area with necrotic tissue no sergei purulence, not fluctuant to touch.  Very mild surrounding redness appears more consistent with irritation than infection.                 Labs:  CBC RESULTS:   Recent Labs   Lab Test 05/14/24  0805   WBC 4.0   HGB 10.2*        Last Basic Metabolic Panel:  Lab Results   Component Value Date    POTASSIUM 4.4 05/15/2024    POTASSIUM 4.3 12/29/2022    POTASSIUM 5.2 07/09/2021     Lab Results   Component Value Date    CR 2.07 05/15/2024    CR 0.77 07/09/2021       Imaging:  No new imaging reviewed

## 2024-05-16 ENCOUNTER — ANESTHESIA (OUTPATIENT)
Dept: SURGERY | Facility: CLINIC | Age: 64
DRG: 464 | End: 2024-05-16
Payer: COMMERCIAL

## 2024-05-16 ENCOUNTER — APPOINTMENT (OUTPATIENT)
Dept: SURGERY | Facility: PHYSICIAN GROUP | Age: 64
End: 2024-05-16
Payer: COMMERCIAL

## 2024-05-16 ENCOUNTER — ANESTHESIA EVENT (OUTPATIENT)
Dept: SURGERY | Facility: CLINIC | Age: 64
DRG: 464 | End: 2024-05-16
Payer: COMMERCIAL

## 2024-05-16 LAB
ALBUMIN SERPL BCG-MCNC: 1.9 G/DL (ref 3.5–5.2)
ALP SERPL-CCNC: 74 U/L (ref 40–150)
ALT SERPL W P-5'-P-CCNC: 9 U/L (ref 0–50)
ANION GAP SERPL CALCULATED.3IONS-SCNC: 9 MMOL/L (ref 7–15)
AST SERPL W P-5'-P-CCNC: 13 U/L (ref 0–45)
BACTERIA TISS BX CULT: NORMAL
BASOPHILS # BLD AUTO: 0.1 10E3/UL (ref 0–0.2)
BASOPHILS NFR BLD AUTO: 3 %
BILIRUB SERPL-MCNC: 0.2 MG/DL
BUN SERPL-MCNC: 24.5 MG/DL (ref 8–23)
CALCIUM SERPL-MCNC: 7.5 MG/DL (ref 8.8–10.2)
CHLORIDE SERPL-SCNC: 101 MMOL/L (ref 98–107)
CREAT SERPL-MCNC: 2.76 MG/DL (ref 0.51–0.95)
DEPRECATED HCO3 PLAS-SCNC: 23 MMOL/L (ref 22–29)
EGFRCR SERPLBLD CKD-EPI 2021: 18 ML/MIN/1.73M2
EOSINOPHIL # BLD AUTO: 0.1 10E3/UL (ref 0–0.7)
EOSINOPHIL NFR BLD AUTO: 4 %
ERYTHROCYTE [DISTWIDTH] IN BLOOD BY AUTOMATED COUNT: 17.9 % (ref 10–15)
FASTING STATUS PATIENT QL REPORTED: YES
GLUCOSE BLDC GLUCOMTR-MCNC: 107 MG/DL (ref 70–99)
GLUCOSE BLDC GLUCOMTR-MCNC: 58 MG/DL (ref 70–99)
GLUCOSE BLDC GLUCOMTR-MCNC: 74 MG/DL (ref 70–99)
GLUCOSE SERPL-MCNC: 66 MG/DL (ref 70–99)
GRAM STAIN RESULT: NORMAL
GRAM STAIN RESULT: NORMAL
HCT VFR BLD AUTO: 32.2 % (ref 35–47)
HGB BLD-MCNC: 10 G/DL (ref 11.7–15.7)
IMM GRANULOCYTES # BLD: 0 10E3/UL
IMM GRANULOCYTES NFR BLD: 0 %
LYMPHOCYTES # BLD AUTO: 1.7 10E3/UL (ref 0.8–5.3)
LYMPHOCYTES NFR BLD AUTO: 51 %
MCH RBC QN AUTO: 30.7 PG (ref 26.5–33)
MCHC RBC AUTO-ENTMCNC: 31.1 G/DL (ref 31.5–36.5)
MCV RBC AUTO: 99 FL (ref 78–100)
MONOCYTES # BLD AUTO: 0.3 10E3/UL (ref 0–1.3)
MONOCYTES NFR BLD AUTO: 8 %
NEUTROPHILS # BLD AUTO: 1.2 10E3/UL (ref 1.6–8.3)
NEUTROPHILS NFR BLD AUTO: 34 %
NRBC # BLD AUTO: 0 10E3/UL
NRBC BLD AUTO-RTO: 0 /100
PLATELET # BLD AUTO: 173 10E3/UL (ref 150–450)
POTASSIUM SERPL-SCNC: 4.7 MMOL/L (ref 3.4–5.3)
PROT SERPL-MCNC: 4.2 G/DL (ref 6.4–8.3)
RBC # BLD AUTO: 3.26 10E6/UL (ref 3.8–5.2)
SODIUM SERPL-SCNC: 133 MMOL/L (ref 135–145)
WBC # BLD AUTO: 3.4 10E3/UL (ref 4–11)

## 2024-05-16 PROCEDURE — 250N000011 HC RX IP 250 OP 636: Performed by: NURSE ANESTHETIST, CERTIFIED REGISTERED

## 2024-05-16 PROCEDURE — 710N000009 HC RECOVERY PHASE 1, LEVEL 1, PER MIN: Performed by: SURGERY

## 2024-05-16 PROCEDURE — 258N000003 HC RX IP 258 OP 636: Performed by: ANESTHESIOLOGY

## 2024-05-16 PROCEDURE — 87205 SMEAR GRAM STAIN: CPT | Performed by: SURGERY

## 2024-05-16 PROCEDURE — 80053 COMPREHEN METABOLIC PANEL: CPT | Performed by: INTERNAL MEDICINE

## 2024-05-16 PROCEDURE — 99223 1ST HOSP IP/OBS HIGH 75: CPT | Performed by: INTERNAL MEDICINE

## 2024-05-16 PROCEDURE — 11042 DBRDMT SUBQ TIS 1ST 20SQCM/<: CPT | Performed by: ANESTHESIOLOGY

## 2024-05-16 PROCEDURE — 120N000001 HC R&B MED SURG/OB

## 2024-05-16 PROCEDURE — 999N000141 HC STATISTIC PRE-PROCEDURE NURSING ASSESSMENT: Performed by: SURGERY

## 2024-05-16 PROCEDURE — 87075 CULTR BACTERIA EXCEPT BLOOD: CPT | Performed by: SURGERY

## 2024-05-16 PROCEDURE — 258N000003 HC RX IP 258 OP 636: Performed by: NURSE ANESTHETIST, CERTIFIED REGISTERED

## 2024-05-16 PROCEDURE — 250N000011 HC RX IP 250 OP 636: Performed by: INTERNAL MEDICINE

## 2024-05-16 PROCEDURE — 258N000001 HC RX 258: Performed by: ANESTHESIOLOGY

## 2024-05-16 PROCEDURE — 250N000011 HC RX IP 250 OP 636: Performed by: ANESTHESIOLOGY

## 2024-05-16 PROCEDURE — 250N000009 HC RX 250: Performed by: SURGERY

## 2024-05-16 PROCEDURE — 250N000013 HC RX MED GY IP 250 OP 250 PS 637: Performed by: INTERNAL MEDICINE

## 2024-05-16 PROCEDURE — 258N000001 HC RX 258: Performed by: SURGERY

## 2024-05-16 PROCEDURE — 11042 DBRDMT SUBQ TIS 1ST 20SQCM/<: CPT | Performed by: NURSE ANESTHETIST, CERTIFIED REGISTERED

## 2024-05-16 PROCEDURE — 90937 HEMODIALYSIS REPEATED EVAL: CPT

## 2024-05-16 PROCEDURE — 97606 NEG PRS WND THER DME>50 SQCM: CPT | Mod: GC | Performed by: SURGERY

## 2024-05-16 PROCEDURE — 370N000017 HC ANESTHESIA TECHNICAL FEE, PER MIN: Performed by: SURGERY

## 2024-05-16 PROCEDURE — 11042 DBRDMT SUBQ TIS 1ST 20SQCM/<: CPT | Mod: 78 | Performed by: SURGERY

## 2024-05-16 PROCEDURE — 258N000003 HC RX IP 258 OP 636: Performed by: INTERNAL MEDICINE

## 2024-05-16 PROCEDURE — 250N000009 HC RX 250: Performed by: NURSE ANESTHETIST, CERTIFIED REGISTERED

## 2024-05-16 PROCEDURE — 250N000025 HC SEVOFLURANE, PER MIN: Performed by: SURGERY

## 2024-05-16 PROCEDURE — 250N000013 HC RX MED GY IP 250 OP 250 PS 637

## 2024-05-16 PROCEDURE — 85025 COMPLETE CBC W/AUTO DIFF WBC: CPT | Performed by: INTERNAL MEDICINE

## 2024-05-16 PROCEDURE — 99232 SBSQ HOSP IP/OBS MODERATE 35: CPT | Performed by: HOSPITALIST

## 2024-05-16 PROCEDURE — 87070 CULTURE OTHR SPECIMN AEROBIC: CPT | Performed by: SURGERY

## 2024-05-16 PROCEDURE — 272N000001 HC OR GENERAL SUPPLY STERILE: Performed by: SURGERY

## 2024-05-16 PROCEDURE — 5A1D70Z PERFORMANCE OF URINARY FILTRATION, INTERMITTENT, LESS THAN 6 HOURS PER DAY: ICD-10-PCS | Performed by: INTERNAL MEDICINE

## 2024-05-16 PROCEDURE — 0JBL0ZZ EXCISION OF RIGHT UPPER LEG SUBCUTANEOUS TISSUE AND FASCIA, OPEN APPROACH: ICD-10-PCS | Performed by: SURGERY

## 2024-05-16 PROCEDURE — 250N000011 HC RX IP 250 OP 636

## 2024-05-16 PROCEDURE — 36415 COLL VENOUS BLD VENIPUNCTURE: CPT | Performed by: INTERNAL MEDICINE

## 2024-05-16 PROCEDURE — 360N000075 HC SURGERY LEVEL 2, PER MIN: Performed by: SURGERY

## 2024-05-16 PROCEDURE — 11045 DBRDMT SUBQ TISS EACH ADDL: CPT | Mod: 78 | Performed by: SURGERY

## 2024-05-16 RX ORDER — ACETAMINOPHEN 325 MG/1
650 TABLET ORAL EVERY 4 HOURS PRN
Status: DISCONTINUED | OUTPATIENT
Start: 2024-05-19 | End: 2024-05-16

## 2024-05-16 RX ORDER — HYDROMORPHONE HCL IN WATER/PF 6 MG/30 ML
0.2 PATIENT CONTROLLED ANALGESIA SYRINGE INTRAVENOUS
Status: DISCONTINUED | OUTPATIENT
Start: 2024-05-16 | End: 2024-05-29 | Stop reason: HOSPADM

## 2024-05-16 RX ORDER — ONDANSETRON 4 MG/1
4 TABLET, ORALLY DISINTEGRATING ORAL EVERY 30 MIN PRN
Status: DISCONTINUED | OUTPATIENT
Start: 2024-05-16 | End: 2024-05-16 | Stop reason: HOSPADM

## 2024-05-16 RX ORDER — POLYETHYLENE GLYCOL 3350 17 G/17G
17 POWDER, FOR SOLUTION ORAL DAILY
Status: DISCONTINUED | OUTPATIENT
Start: 2024-05-17 | End: 2024-05-29

## 2024-05-16 RX ORDER — ACETAMINOPHEN 325 MG/1
975 TABLET ORAL EVERY 8 HOURS
Qty: 27 TABLET | Refills: 0 | Status: DISCONTINUED | OUTPATIENT
Start: 2024-05-16 | End: 2024-05-17

## 2024-05-16 RX ORDER — SODIUM CHLORIDE 9 MG/ML
INJECTION, SOLUTION INTRAVENOUS CONTINUOUS
Status: CANCELLED | OUTPATIENT
Start: 2024-05-16

## 2024-05-16 RX ORDER — FENTANYL CITRATE 50 UG/ML
50 INJECTION, SOLUTION INTRAMUSCULAR; INTRAVENOUS EVERY 5 MIN PRN
Status: DISCONTINUED | OUTPATIENT
Start: 2024-05-16 | End: 2024-05-16 | Stop reason: HOSPADM

## 2024-05-16 RX ORDER — FENTANYL CITRATE 50 UG/ML
INJECTION, SOLUTION INTRAMUSCULAR; INTRAVENOUS PRN
Status: DISCONTINUED | OUTPATIENT
Start: 2024-05-16 | End: 2024-05-16

## 2024-05-16 RX ORDER — METHYLPREDNISOLONE 16 MG/1
32 TABLET ORAL ONCE
Status: CANCELLED | OUTPATIENT
Start: 2024-05-16 | End: 2024-05-16

## 2024-05-16 RX ORDER — OXYCODONE HYDROCHLORIDE 5 MG/1
10 TABLET ORAL
Status: DISCONTINUED | OUTPATIENT
Start: 2024-05-16 | End: 2024-05-16 | Stop reason: HOSPADM

## 2024-05-16 RX ORDER — AMOXICILLIN 250 MG
1 CAPSULE ORAL 2 TIMES DAILY
Status: DISCONTINUED | OUTPATIENT
Start: 2024-05-16 | End: 2024-05-16

## 2024-05-16 RX ORDER — HYDROMORPHONE HYDROCHLORIDE 1 MG/ML
INJECTION, SOLUTION INTRAMUSCULAR; INTRAVENOUS; SUBCUTANEOUS PRN
Status: DISCONTINUED | OUTPATIENT
Start: 2024-05-16 | End: 2024-05-16

## 2024-05-16 RX ORDER — PROPOFOL 10 MG/ML
INJECTION, EMULSION INTRAVENOUS PRN
Status: DISCONTINUED | OUTPATIENT
Start: 2024-05-16 | End: 2024-05-16

## 2024-05-16 RX ORDER — ALBUTEROL SULFATE 0.83 MG/ML
2.5 SOLUTION RESPIRATORY (INHALATION) EVERY 4 HOURS PRN
Status: DISCONTINUED | OUTPATIENT
Start: 2024-05-16 | End: 2024-05-16 | Stop reason: HOSPADM

## 2024-05-16 RX ORDER — DEXAMETHASONE SODIUM PHOSPHATE 4 MG/ML
INJECTION, SOLUTION INTRA-ARTICULAR; INTRALESIONAL; INTRAMUSCULAR; INTRAVENOUS; SOFT TISSUE PRN
Status: DISCONTINUED | OUTPATIENT
Start: 2024-05-16 | End: 2024-05-16

## 2024-05-16 RX ORDER — LABETALOL HYDROCHLORIDE 5 MG/ML
10 INJECTION, SOLUTION INTRAVENOUS
Status: DISCONTINUED | OUTPATIENT
Start: 2024-05-16 | End: 2024-05-16 | Stop reason: HOSPADM

## 2024-05-16 RX ORDER — ONDANSETRON 2 MG/ML
INJECTION INTRAMUSCULAR; INTRAVENOUS PRN
Status: DISCONTINUED | OUTPATIENT
Start: 2024-05-16 | End: 2024-05-16

## 2024-05-16 RX ORDER — LIDOCAINE 40 MG/G
CREAM TOPICAL
Status: DISCONTINUED | OUTPATIENT
Start: 2024-05-16 | End: 2024-05-20

## 2024-05-16 RX ORDER — CLINDAMYCIN PHOSPHATE 900 MG/50ML
900 INJECTION, SOLUTION INTRAVENOUS
Status: COMPLETED | OUTPATIENT
Start: 2024-05-16 | End: 2024-05-16

## 2024-05-16 RX ORDER — DEXTROSE MONOHYDRATE 25 G/50ML
25-50 INJECTION, SOLUTION INTRAVENOUS
Status: DISCONTINUED | OUTPATIENT
Start: 2024-05-16 | End: 2024-05-29 | Stop reason: HOSPADM

## 2024-05-16 RX ORDER — OXYCODONE HYDROCHLORIDE 5 MG/1
5 TABLET ORAL
Status: DISCONTINUED | OUTPATIENT
Start: 2024-05-16 | End: 2024-05-16 | Stop reason: HOSPADM

## 2024-05-16 RX ORDER — DEXAMETHASONE SODIUM PHOSPHATE 4 MG/ML
4 INJECTION, SOLUTION INTRA-ARTICULAR; INTRALESIONAL; INTRAMUSCULAR; INTRAVENOUS; SOFT TISSUE
Status: DISCONTINUED | OUTPATIENT
Start: 2024-05-16 | End: 2024-05-16 | Stop reason: HOSPADM

## 2024-05-16 RX ORDER — MAGNESIUM HYDROXIDE 1200 MG/15ML
LIQUID ORAL PRN
Status: DISCONTINUED | OUTPATIENT
Start: 2024-05-16 | End: 2024-05-16 | Stop reason: HOSPADM

## 2024-05-16 RX ORDER — PROCHLORPERAZINE MALEATE 5 MG
5 TABLET ORAL EVERY 6 HOURS PRN
Status: DISCONTINUED | OUTPATIENT
Start: 2024-05-16 | End: 2024-05-29 | Stop reason: HOSPADM

## 2024-05-16 RX ORDER — CALCIUM CARBONATE 500 MG/1
500 TABLET, CHEWABLE ORAL 4 TIMES DAILY PRN
Status: DISCONTINUED | OUTPATIENT
Start: 2024-05-16 | End: 2024-05-29 | Stop reason: HOSPADM

## 2024-05-16 RX ORDER — CLINDAMYCIN PHOSPHATE 900 MG/50ML
900 INJECTION, SOLUTION INTRAVENOUS SEE ADMIN INSTRUCTIONS
Status: DISCONTINUED | OUTPATIENT
Start: 2024-05-16 | End: 2024-05-16 | Stop reason: HOSPADM

## 2024-05-16 RX ORDER — NALOXONE HYDROCHLORIDE 0.4 MG/ML
0.1 INJECTION, SOLUTION INTRAMUSCULAR; INTRAVENOUS; SUBCUTANEOUS
Status: DISCONTINUED | OUTPATIENT
Start: 2024-05-16 | End: 2024-05-16 | Stop reason: HOSPADM

## 2024-05-16 RX ORDER — ONDANSETRON 2 MG/ML
4 INJECTION INTRAMUSCULAR; INTRAVENOUS EVERY 30 MIN PRN
Status: DISCONTINUED | OUTPATIENT
Start: 2024-05-16 | End: 2024-05-16 | Stop reason: HOSPADM

## 2024-05-16 RX ORDER — BISACODYL 10 MG
10 SUPPOSITORY, RECTAL RECTAL DAILY PRN
Status: DISCONTINUED | OUTPATIENT
Start: 2024-05-19 | End: 2024-05-29 | Stop reason: HOSPADM

## 2024-05-16 RX ORDER — HYDROMORPHONE HCL IN WATER/PF 6 MG/30 ML
0.2 PATIENT CONTROLLED ANALGESIA SYRINGE INTRAVENOUS EVERY 5 MIN PRN
Status: DISCONTINUED | OUTPATIENT
Start: 2024-05-16 | End: 2024-05-16 | Stop reason: HOSPADM

## 2024-05-16 RX ORDER — HYDRALAZINE HYDROCHLORIDE 20 MG/ML
2.5-5 INJECTION INTRAMUSCULAR; INTRAVENOUS EVERY 10 MIN PRN
Status: DISCONTINUED | OUTPATIENT
Start: 2024-05-16 | End: 2024-05-16 | Stop reason: HOSPADM

## 2024-05-16 RX ORDER — LIDOCAINE HYDROCHLORIDE 20 MG/ML
INJECTION, SOLUTION INFILTRATION; PERINEURAL PRN
Status: DISCONTINUED | OUTPATIENT
Start: 2024-05-16 | End: 2024-05-16

## 2024-05-16 RX ORDER — SODIUM CHLORIDE, SODIUM LACTATE, POTASSIUM CHLORIDE, CALCIUM CHLORIDE 600; 310; 30; 20 MG/100ML; MG/100ML; MG/100ML; MG/100ML
INJECTION, SOLUTION INTRAVENOUS CONTINUOUS
Status: DISCONTINUED | OUTPATIENT
Start: 2024-05-16 | End: 2024-05-16 | Stop reason: HOSPADM

## 2024-05-16 RX ORDER — CLINDAMYCIN PHOSPHATE 900 MG/50ML
900 INJECTION, SOLUTION INTRAVENOUS EVERY 8 HOURS
Qty: 100 ML | Refills: 0 | Status: COMPLETED | OUTPATIENT
Start: 2024-05-16 | End: 2024-05-17

## 2024-05-16 RX ORDER — FENTANYL CITRATE 50 UG/ML
25 INJECTION, SOLUTION INTRAMUSCULAR; INTRAVENOUS EVERY 5 MIN PRN
Status: DISCONTINUED | OUTPATIENT
Start: 2024-05-16 | End: 2024-05-16 | Stop reason: HOSPADM

## 2024-05-16 RX ORDER — LIDOCAINE 40 MG/G
CREAM TOPICAL
Status: DISCONTINUED | OUTPATIENT
Start: 2024-05-16 | End: 2024-05-16 | Stop reason: HOSPADM

## 2024-05-16 RX ORDER — NICOTINE POLACRILEX 4 MG
15-30 LOZENGE BUCCAL
Status: DISCONTINUED | OUTPATIENT
Start: 2024-05-16 | End: 2024-05-29 | Stop reason: HOSPADM

## 2024-05-16 RX ORDER — FENTANYL CITRATE 50 UG/ML
25 INJECTION, SOLUTION INTRAMUSCULAR; INTRAVENOUS
Status: DISCONTINUED | OUTPATIENT
Start: 2024-05-16 | End: 2024-05-16 | Stop reason: HOSPADM

## 2024-05-16 RX ORDER — ONDANSETRON 2 MG/ML
4 INJECTION INTRAMUSCULAR; INTRAVENOUS EVERY 6 HOURS PRN
Status: DISCONTINUED | OUTPATIENT
Start: 2024-05-16 | End: 2024-05-29 | Stop reason: HOSPADM

## 2024-05-16 RX ORDER — LIDOCAINE 40 MG/G
CREAM TOPICAL
Status: CANCELLED | OUTPATIENT
Start: 2024-05-16

## 2024-05-16 RX ORDER — FAMOTIDINE 20 MG/1
20 TABLET, FILM COATED ORAL
Status: DISCONTINUED | OUTPATIENT
Start: 2024-05-16 | End: 2024-05-29 | Stop reason: HOSPADM

## 2024-05-16 RX ORDER — SODIUM CHLORIDE 9 MG/ML
INJECTION, SOLUTION INTRAVENOUS CONTINUOUS PRN
Status: DISCONTINUED | OUTPATIENT
Start: 2024-05-16 | End: 2024-05-16

## 2024-05-16 RX ORDER — HYDROMORPHONE HCL IN WATER/PF 6 MG/30 ML
0.1 PATIENT CONTROLLED ANALGESIA SYRINGE INTRAVENOUS
Status: DISCONTINUED | OUTPATIENT
Start: 2024-05-16 | End: 2024-05-29 | Stop reason: HOSPADM

## 2024-05-16 RX ORDER — ONDANSETRON 4 MG/1
4 TABLET, ORALLY DISINTEGRATING ORAL EVERY 6 HOURS PRN
Status: DISCONTINUED | OUTPATIENT
Start: 2024-05-16 | End: 2024-05-29 | Stop reason: HOSPADM

## 2024-05-16 RX ORDER — HYDROMORPHONE HCL IN WATER/PF 6 MG/30 ML
0.4 PATIENT CONTROLLED ANALGESIA SYRINGE INTRAVENOUS EVERY 5 MIN PRN
Status: DISCONTINUED | OUTPATIENT
Start: 2024-05-16 | End: 2024-05-16 | Stop reason: HOSPADM

## 2024-05-16 RX ORDER — MEPERIDINE HYDROCHLORIDE 25 MG/ML
12.5 INJECTION INTRAMUSCULAR; INTRAVENOUS; SUBCUTANEOUS EVERY 5 MIN PRN
Status: DISCONTINUED | OUTPATIENT
Start: 2024-05-16 | End: 2024-05-16 | Stop reason: HOSPADM

## 2024-05-16 RX ORDER — DEXTROSE MONOHYDRATE 25 G/50ML
25 INJECTION, SOLUTION INTRAVENOUS ONCE
Status: COMPLETED | OUTPATIENT
Start: 2024-05-16 | End: 2024-05-16

## 2024-05-16 RX ADMIN — ONDANSETRON 4 MG: 2 INJECTION INTRAMUSCULAR; INTRAVENOUS at 15:29

## 2024-05-16 RX ADMIN — CLINDAMYCIN PHOSPHATE 900 MG: 900 INJECTION, SOLUTION INTRAVENOUS at 22:02

## 2024-05-16 RX ADMIN — PHENYLEPHRINE HYDROCHLORIDE 100 MCG: 10 INJECTION INTRAVENOUS at 15:17

## 2024-05-16 RX ADMIN — DEXTROSE MONOHYDRATE 25 ML: 25 INJECTION, SOLUTION INTRAVENOUS at 13:03

## 2024-05-16 RX ADMIN — ACETAMINOPHEN 650 MG: 325 TABLET, FILM COATED ORAL at 00:55

## 2024-05-16 RX ADMIN — SODIUM CHLORIDE: 9 INJECTION, SOLUTION INTRAVENOUS at 14:42

## 2024-05-16 RX ADMIN — CARVEDILOL 6.25 MG: 3.12 TABLET, FILM COATED ORAL at 19:22

## 2024-05-16 RX ADMIN — PHENYLEPHRINE HYDROCHLORIDE 100 MCG: 10 INJECTION INTRAVENOUS at 15:12

## 2024-05-16 RX ADMIN — CLINDAMYCIN PHOSPHATE 900 MG: 900 INJECTION, SOLUTION INTRAVENOUS at 14:43

## 2024-05-16 RX ADMIN — GABAPENTIN 200 MG: 100 CAPSULE ORAL at 22:01

## 2024-05-16 RX ADMIN — HYDROMORPHONE HYDROCHLORIDE 0.5 MG: 1 INJECTION, SOLUTION INTRAMUSCULAR; INTRAVENOUS; SUBCUTANEOUS at 15:59

## 2024-05-16 RX ADMIN — MIDAZOLAM 1 MG: 1 INJECTION INTRAMUSCULAR; INTRAVENOUS at 14:55

## 2024-05-16 RX ADMIN — ACETAMINOPHEN 975 MG: 325 TABLET, FILM COATED ORAL at 19:21

## 2024-05-16 RX ADMIN — SODIUM CHLORIDE 250 ML: 9 INJECTION, SOLUTION INTRAVENOUS at 10:28

## 2024-05-16 RX ADMIN — HYDROMORPHONE HYDROCHLORIDE 0.4 MG: 0.2 INJECTION, SOLUTION INTRAMUSCULAR; INTRAVENOUS; SUBCUTANEOUS at 16:20

## 2024-05-16 RX ADMIN — PHENYLEPHRINE HYDROCHLORIDE 0.7 MCG/KG/MIN: 10 INJECTION INTRAVENOUS at 15:09

## 2024-05-16 RX ADMIN — SODIUM CHLORIDE 300 ML: 9 INJECTION, SOLUTION INTRAVENOUS at 10:28

## 2024-05-16 RX ADMIN — PHENYLEPHRINE HYDROCHLORIDE 100 MCG: 10 INJECTION INTRAVENOUS at 15:04

## 2024-05-16 RX ADMIN — Medication 1 CAPSULE: at 22:01

## 2024-05-16 RX ADMIN — MIDAZOLAM 1 MG: 1 INJECTION INTRAMUSCULAR; INTRAVENOUS at 14:51

## 2024-05-16 RX ADMIN — FENTANYL CITRATE 50 MCG: 50 INJECTION INTRAMUSCULAR; INTRAVENOUS at 14:59

## 2024-05-16 RX ADMIN — LIDOCAINE HYDROCHLORIDE 100 MG: 20 INJECTION, SOLUTION INFILTRATION; PERINEURAL at 14:59

## 2024-05-16 RX ADMIN — HYDROMORPHONE HYDROCHLORIDE 0.4 MG: 0.2 INJECTION, SOLUTION INTRAMUSCULAR; INTRAVENOUS; SUBCUTANEOUS at 16:12

## 2024-05-16 RX ADMIN — Medication: at 10:34

## 2024-05-16 RX ADMIN — ROSUVASTATIN CALCIUM 10 MG: 10 TABLET, FILM COATED ORAL at 22:02

## 2024-05-16 RX ADMIN — FENTANYL CITRATE 50 MCG: 50 INJECTION INTRAMUSCULAR; INTRAVENOUS at 15:08

## 2024-05-16 RX ADMIN — PHENYLEPHRINE HYDROCHLORIDE 100 MCG: 10 INJECTION INTRAVENOUS at 15:08

## 2024-05-16 RX ADMIN — HEPARIN SODIUM 1600 UNITS: 1000 INJECTION INTRAVENOUS; SUBCUTANEOUS at 10:33

## 2024-05-16 RX ADMIN — FAMOTIDINE 20 MG: 20 TABLET, FILM COATED ORAL at 19:21

## 2024-05-16 RX ADMIN — DEXAMETHASONE SODIUM PHOSPHATE 4 MG: 4 INJECTION, SOLUTION INTRA-ARTICULAR; INTRALESIONAL; INTRAMUSCULAR; INTRAVENOUS; SOFT TISSUE at 15:27

## 2024-05-16 RX ADMIN — PROPOFOL 150 MG: 10 INJECTION, EMULSION INTRAVENOUS at 14:59

## 2024-05-16 RX ADMIN — SODIUM CHLORIDE 1000 ML: 9 INJECTION, SOLUTION INTRAVENOUS at 13:17

## 2024-05-16 RX ADMIN — CLINDAMYCIN PHOSPHATE 900 MG: 900 INJECTION, SOLUTION INTRAVENOUS at 14:35

## 2024-05-16 ASSESSMENT — ACTIVITIES OF DAILY LIVING (ADL)
ADLS_ACUITY_SCORE: 58
EQUIPMENT_CURRENTLY_USED_AT_HOME: WALKER, ROLLING
ADLS_ACUITY_SCORE: 58
ADLS_ACUITY_SCORE: 54
ADLS_ACUITY_SCORE: 58
ADLS_ACUITY_SCORE: 54
ADLS_ACUITY_SCORE: 54
ADLS_ACUITY_SCORE: 58
ADLS_ACUITY_SCORE: 58
ADLS_ACUITY_SCORE: 54
ADLS_ACUITY_SCORE: 58
ADLS_ACUITY_SCORE: 54
ADLS_ACUITY_SCORE: 58
ADLS_ACUITY_SCORE: 58
ADLS_ACUITY_SCORE: 54
ADLS_ACUITY_SCORE: 58
ADLS_ACUITY_SCORE: 58
ADLS_ACUITY_SCORE: 54
ADLS_ACUITY_SCORE: 58
ADLS_ACUITY_SCORE: 58

## 2024-05-16 ASSESSMENT — LIFESTYLE VARIABLES: TOBACCO_USE: 1

## 2024-05-16 ASSESSMENT — ENCOUNTER SYMPTOMS: DYSRHYTHMIAS: 1

## 2024-05-16 ASSESSMENT — COPD QUESTIONNAIRES: COPD: 1

## 2024-05-16 NOTE — PLAN OF CARE
Summary: Pt transferred to [facility] for I&D of right AKA  Orientation/Cognitive: A/Ox4  Mobility Level/Assist Equipment: Ax1-2 pivot  Fall Risk (Y/N): yes  Behavior Concerns: none  Pain Management: no pain reported  Tele/VS/O2: VSS ex HTN, hydralazine available for SBP > 185  ABNL Lab/BG: Na 131  Diet: Renal diet, NPO at midnight  Bowel/Bladder: incontinent of B&B. Pt reports diarrhea at times  Skin Concerns: Right Aka has scabbing and scant drainage, sacrum wound is pale and moist  Drains/Devices: PIV SL, right dialysis port  Tests/Procedures for next shift: I&D 5/16/24 at 1:30 pm, vascular following, neph consulted  Anticipated DC date & active delays: tbd

## 2024-05-16 NOTE — CONSULTS
[facility]    Nephrology Consultation     Date of Admission:  5/15/2024    Assessment & Plan     Shirley Hendricks is a 63 year old female who was admitted on 5/15/2024.     1) Dialysis dependent LIMA:  She appears to be in some kidney recovery.  We will need to monitor her with this in mind.  As she has surgery this afternoon I will proceed with dialysis this AM.  Over the next few days we will  day by day if she needs more dialysis.      2) RLE AKA wound dehiscence and necrosis: She will have I & D this afternoon.      3)Anemia: HGB up to 10.0  I will not give CRIS today.    4) HTN:  BP is generous this AM. She has not had any of her meds as she is NPO.  Will follow as assess BP control after she has had her meds.    Plan:    HD this AM.  1 L UF    Following.        Jacobo Torre MD  [facility] - Nephrology  [phone]    Reason for Consult     I was asked to see the patient for dialysis dependent LIMA.    Primary Care Physician     Vasquez Benoit    Chief Complaint     Needs dialysis    History is obtained from the patient and chart review.      History of Present Illness     Shirley Hendricks is a 63 year old female who presents with dialysis dependent LIMA and need for wound debridement.    She had trouble with RLE ischemia that lead to non salvageable tissue.    She underwent R AKA 4/1/24 with final closure 4/9/24.      She experienced multifactorial LIMA - rhabdo, multiple contrast loads.  She became dependent on hemodialysis.      She has been at  ARU at [facility] since.    Dr. Hodges suspects that she has some kidney recovery underway given a fall in pre HD creatinines.      Transferred back to [facility] for debridement of RLE AKA as she had dehiscence of wound and development of necrosis of anterior aspect.      She is scheduled for surgery this afternoon.      She is bothered by loose stool that has lead her to be incontinent.    Past Medical History   I have reviewed  this patient's medical history and updated it with pertinent information if needed.   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 clot    Post-menopausal     Reflux esophagitis 02/2004    EGD: esophagitis and medium HH    SBO (small bowel obstruction) (H) 08/10/2021    SVT (supraventricular tachycardia) (H24)     Thrombocytopenia (H24)     Uncomplicated asthma     Vitamin C deficiency 08/12/2018       Past Surgical History   I have reviewed this patient's surgical history and updated it with pertinent information if needed.  Past Surgical History:   Procedure Laterality Date    AMPUTATE LEG ABOVE KNEE N/A 4/1/2024    Procedure: AMPUTATION, ABOVE KNEE right leg with wound vac dressing, excision of anteriotibial bypass graft, closure of (previous interventional radiology) left groin incision;  Surgeon: José Luis Hernandez MD;  Location:  OR    AMPUTATE LEG ABOVE KNEE Right 4/9/2024    Procedure: COMPLETION RIGHT ABOVE KNEE AMPUTATION;  Surgeon: José Luis Hernandez MD;  Location:  OR    ANGIOGRAM      ANGIOGRAM Right 6/23/2021    Procedure: RIGHT LOWER EXTREMITY ANGIOGRAM WITH LEFT BRACHIAL ARTERY CUTDOWN;  Surgeon: José Luis Hernandez,  MD;  Location:  OR    BIOPSY MASS NECK Right 10/11/2023    Procedure: Right Parotid Biopsy;  Surgeon: Randal Mendoza MD;  Location:  OR    BYPASS GRAFT FEMOROPOPLITEAL Right 09/23/2020    Procedure: RIGHT FEMORAL GRAFT TO ABOVE-KNEE POPLITEAL BYPASS USING CADAVERIC SUPERFICIAL FEMORAL ARTERY;  Surgeon: Chadwick Lozano MD;  Location:  OR    BYPASS GRAFT FEMOROPOPLITEAL Right 2/15/2022    Procedure: RIGHT SUPERFICIAL FEMORAL ARTERY GRAFT TO BELOW KNEE POPLITEAL BYPASS WITH CADAVERIC CRYOLIFE  FEMORAL-POPLITEAL ARTERY SIZE: OUTER DIAMETER: 7MM - 6MM;  Surgeon: Chadwick Lozano;  Location: [facility] OR    BYPASS GRAFT FEMOROPOPLITEAL Right 5/26/2023    Procedure: RIGHT DISTAL SUPERFICIAL FEMORAL GRAFT TO ANTERIOR TIBIAL ARTERY WITH 26 CENTIMETER CADAVERIC SUPERFICIAL FEMORAL ARTERY GRAFT;  Surgeon: Chadwick Lozano MD;  Location:  OR    BYPASS GRAFT FEMOROPOPLITEAL Right 10/11/2023    Procedure: RIGHT FEMORAL TO POPLITEAL GRAFT THROMBECTOMY;  Surgeon: Chadwick Lozano MD;  Location:  OR    BYPASS GRAFT INSITU FEMOROPOPLITEAL Right 7/7/2021    Procedure: CREATION RIGHT FEMORAL TO POPLITEAL ARTERIAL BYPASS USING INSITU VEIN GRAFT;  Surgeon: José Luis Hernandez MD;  Location:  OR    CARDIAC CATHERIZATION  09/03/2009    multivessel CAD without target lesions, med mgmt indicated, preserved ef    CARPAL TUNNEL RELEASE RT/LT Right 05/20/2021    COLONOSCOPY N/A 12/12/2023    Procedure: Colonoscopy;  Surgeon: Corey Hoffman MD;  Location:  GI    COLONOSCOPY N/A 12/14/2023    Procedure: Colonoscopy;  Surgeon: Corey Hoffman MD;  Location:  GI    CV CORONARY ANGIOGRAM N/A 10/4/2023    Procedure: Coronary Angiogram;  Surgeon: Rogelio Ricks MD;  Location:  HEART CARDIAC CATH LAB    CV PCI N/A 10/4/2023    Procedure: Percutaneous Coronary Intervention;  Surgeon: Rogelio Ricks MD;  Location:  HEART CARDIAC CATH LAB    EMBOLECTOMY LOWER EXTREMITY Right  10/6/2023    Procedure: Right anterior tibial bypass with graft, Right tibial endarterectomy,thrombectomy, Right doraslis pedis thrombectomy, Anterior Tibial vein patch.;  Surgeon: Chadwick Lozano MD;  Location:  OR    ENDARTERECTOMY CAROTID Right 05/11/2016    Procedure: ENDARTERECTOMY CAROTID;  Surgeon: Chadwick Lozano MD;  Location:  OR    ENDARTERECTOMY CAROTID Left 06/08/2020    Procedure: LEFT CAROTID ENDARTERECTOMY with distal facal vein patch  and EEG;  Surgeon: Chadwick Lozano MD;  Location:  OR    ESOPHAGOSCOPY, GASTROSCOPY, DUODENOSCOPY (EGD), COMBINED N/A 12/12/2023    Procedure: Esophagoscopy, gastroscopy, duodenoscopy (EGD), combined;  Surgeon: Corey Hoffman MD;  Location:  GI    FINE NEEDLE ASPIRATION WITHOUT IMAGING GUIDANCE Right 9/22/2023    Procedure: Fine needle aspiration without imaging guidance;  Surgeon: Kiersten Aguilera MD;  Location:  GI    FLUORESCENCE INTRAOPERATIVE VASCULAR ANGIOGRAPHY Right 12/28/2022    Procedure: RIGHT LEG ANGIOGRAM with intervention;  Surgeon: Chadwick Lozano MD;  Location:  OR    GYN SURGERY  left tube    left salpingectomy    IR ANGIOGRAM THROUGH CATHETER (ARTERIAL)  10/6/2023    IR ANGIOGRAM THROUGH CATHETER (ARTERIAL)  10/6/2023    IR ANGIOGRAM THROUGH CATHETER (ARTERIAL)  3/31/2024    IR ANGIOGRAM THROUGH CATHETER (ARTERIAL)  3/30/2024    IR CVC TUNNEL PLACEMENT > 5 YRS OF AGE  4/3/2024    IR CVC TUNNEL REVISION RIGHT  4/15/2024    IR LOWER EXTREMITY ANGIOGRAM RIGHT  05/25/2021    IR LOWER EXTREMITY ANGIOGRAM RIGHT  10/5/2023    IR LOWER EXTREMITY ANGIOGRAM RIGHT  3/29/2024    IR OR ANGIOGRAM  6/23/2021    IR OR ANGIOGRAM  12/28/2022    LAPAROSCOPIC CHOLECYSTECTOMY N/A 09/27/2017    Procedure: LAPAROSCOPIC CHOLECYSTECTOMY;  LAPAROSCOPIC CHOLECYSTECTOMY;  Surgeon: Jacoby Tapia MD;  Location: [facility]    LAPAROSCOPY DIAGNOSTIC (GENERAL) N/A 8/11/2021    Procedure: Exploratory laparoscopy,   laparoscopic lysis of adhesions, laparotomy;  Surgeon: Corina Ferreira MD;  Location:  OR    OR ANGIOGRAM, LOWER EXTREMITY Right 10/11/2023    Procedure: Or Angiogram, Lower Extremity;  Surgeon: Chadwick Lozano MD;  Location:  OR    ORTHOPEDIC SURGERY      left knee surgery    REPAIR ANEURYSM FEMORAL ARTERY Left 12/28/2022    Procedure: REPAIR LEFT FEMORAL PSEUDOANEURYSM;  Surgeon: Chadwick Lozano MD;  Location:  OR    VASCULAR SURGERY  aoto bi fem  left fem-AK pop    [facility] FABRIC WRAPPING OF ABDOMINAL ANEURYSM      [facility] NONSPECIFIC PROCEDURE  12/2000    angioplasty with stent right fem. a.    [facility] NONSPECIFIC PROCEDURE  1987    sinus surgery    [facility] NONSPECIFIC PROCEDURE  09/02/2009    Emergent left groin exploration with oversewing of bleeding angiographic site. 2. Endarterectomy of common femoral-proximal superficial femoral artery with greater saphenous vein patch angioplasty.   a. [place] of accessory right greater saphenous vein.     [facility] NONSPECIFIC PROCEDURE  08/27/2009    occluded left common iliac and external iliac arteries were successfully revascularized with stenting to 8 and 7 mm        Prior to Admission Medications   Prior to Admission Medications   Prescriptions Last Dose Informant Patient Reported? Taking?   acetaminophen (TYLENOL) 500 MG tablet 5/15/2024 at 1500 Self Yes Yes   Sig: Take 500-1,000 mg by mouth every 6 hours as needed for mild pain   amLODIPine (NORVASC) 10 MG tablet 5/15/2024 at 0830  No Yes   Sig: Take 1 tablet (10 mg) by mouth daily   bisacodyl (DULCOLAX) 10 MG suppository prn  No Yes   Sig: Place 1 suppository (10 mg) rectally daily as needed for constipation   bumetanide (BUMEX) 2 MG tablet 5/15/2024 at 1000  No Yes   Sig: Take 2 tablets (4 mg) by mouth four times a week   Patient taking differently: Take 4 mg by mouth four times a week Sunday, Monday, Wed, Friday   carvedilol (COREG) 6.25 MG tablet 5/15/2024 at 0830  No Yes   Sig: Take 1 tablet (6.25 mg) by  mouth 2 times daily (with meals)   cetirizine (ZYRTEC) 5 MG tablet 5/15/2024 at 1000  No Yes   Sig: Take 1 tablet (5 mg) by mouth daily   clopidogrel (PLAVIX) 75 MG tablet 5/15/2024 at 1000  No Yes   Sig: Take 1 tablet (75 mg) by mouth daily   diphenoxylate-atropine (LOMOTIL) 2.5-0.025 MG tablet 4/30/2024  Yes Yes   Sig: Take 1 tablet by mouth 4 times daily as needed for diarrhea   famotidine (PEPCID) 20 MG tablet 5/14/2024 at 1630  No Yes   Sig: Take 1 tablet (20 mg) by mouth every 48 hours   fluticasone-vilanterol (BREO ELLIPTA) 200-25 MCG/ACT inhaler 5/14/2024 at 1030  No Yes   Sig: Inhale 1 puff into the lungs daily   gabapentin (NEURONTIN) 100 MG capsule 5/14/2024 at 2100  No Yes   Sig: Take 2 capsules (200 mg) by mouth three times a week   Patient taking differently: Take 200 mg by mouth three times a week Tues, Thurs, Sat   heparin 21731 units/mL injection 5/15/2024 at 1000  Yes Yes   Sig: Inject 5,000 Units Subcutaneous every 12 hours   hydrALAZINE (APRESOLINE) 10 MG tablet prn  No Yes   Sig: Take 1 tablet (10 mg) by mouth 4 times daily as needed for high blood pressure (SBP > 185.)   lactobacillus rhamnosus, GG, (CULTURELL) capsule 5/15/2024 at 1000  No Yes   Sig: Take 1 capsule by mouth 2 times daily   miconazole (MICATIN) 2 % external powder 5/15/2024 at 1000  No Yes   Sig: Apply topically 2 times daily   multivitamin RENAL (TRIPHROCAPS) 1 capsule capsule 5/15/2024 at 1300  No Yes   Sig: Take 1 capsule by mouth daily   nicotine (NICODERM CQ) 14 MG/24HR 24 hr patch 5/15/2024 at 0830  No Yes   Sig: Place 1 patch onto the skin daily   nitroGLYcerin (NITROSTAT) 0.4 MG sublingual tablet prn Self No Yes   Sig: Place 1 tablet (0.4 mg) under the tongue See Admin Instructions for chest pain   ondansetron (ZOFRAN ODT) 4 MG ODT tab 5/14/2024 at 2100  No Yes   Sig: Take 1 tablet (4 mg) by mouth every 6 hours as needed for nausea or vomiting   polyethylene glycol (MIRALAX) 17 GM/Dose powder 5/15/2024 at 1000  No Yes    Sig: Take 17 g by mouth daily   polyethylene glycol (MIRALAX) 17 g packet prn  Yes Yes   Sig: Take 17 g by mouth daily as needed for constipation   rosuvastatin (CRESTOR) 10 MG tablet 5/14/2024 at 2100  No Yes   Sig: Take 1 tablet (10 mg) by mouth at bedtime   senna-docusate (SENOKOT-S/PERICOLACE) 8.6-50 MG tablet 5/15/2024 at 1000  No Yes   Sig: Take 1-2 tablets by mouth 2 times daily   silver sulfADIAZINE (SILVADENE) 1 % external cream 5/15/2024 at 1400  No Yes   Sig: Apply topically daily      Facility-Administered Medications: None     Allergies   Allergies   Allergen Reactions    Pantoprazole      Protonix caused diffuse edema    Chantix [Varenicline]      Terrible dreams    Contrast Dye Swelling     Patient reports facial and throat swelling with prior CT contrast.    Penicillins Itching and Rash       Social History   I have reviewed this patient's social history and updated it with pertinent information if needed. Shirley Hendricks  reports that she has been smoking cigarettes. She started smoking about 56 years ago. She has a 14.1 pack-year smoking history. She has never used smokeless tobacco. She reports that she does not currently use alcohol. She reports current drug use. Drug: Marijuana.    Family History   I have reviewed this patient's family history and updated it with pertinent information if needed.   Family History   Problem Relation Age of Onset    Cancer Mother         bladder    Cardiovascular Father         alive,multiple heart attacks    Diabetes Maternal Grandmother     Lung Cancer Maternal Grandmother     Blood Disease Brother         clotting disorder       Review of Systems   The 10 point Review of Systems is negative other than noted in the HPI.     Physical Exam   Temp: 98.2  F (36.8  C) Temp src: Oral BP: (!) 156/98 Pulse: 68   Resp: 19 SpO2: 96 % O2 Device: None (Room air)    Vital Signs with Ranges  Temp:  [97.7  F (36.5  C)-98.6  F (37  C)] 98.2  F (36.8  C)  Pulse:  [64-70]  68  Resp:  [17-22] 19  BP: (135-180)/() 156/98  SpO2:  [94 %-98 %] 96 %  123 lbs 7.32 oz    GENERAL: alert, NAD  HEENT:  Normocephalic. No gross abnormalities.  Pupils equal.  MMM.  Dentition is ok.  CV: RRR, no murmurs, no clicks, gallops, or rubs, trace edema, no carotid bruits  RESP: Clear bilaterally with good efforts  GI: Abdomen soft/nt/nd, BS normal. No masses, organomegaly  MUSCULOSKELETAL: extremities nl - no gross deformities noted  SKIN: no suspicious lesions or rashes, dry to touch  NEURO:  Strength normal and symmetric.   PSYCH: mood good, affect appropriate  LYMPH: No palpable ant/post cervical and supraclavicular adenopathy    Data   BMP  Recent Labs   Lab 05/16/24  0832 05/15/24  0542 05/14/24  0805 05/13/24  0706   * 131* 128* 129*   POTASSIUM 4.7 4.4 4.4 4.3   CHLORIDE 101 99 96* 96*   SONIA 7.5* 7.4* 7.5* 7.4*   CO2 23 24 25 25   BUN 24.5* 15.5 22.5 14.2   CR 2.76* 2.07* 3.18* 2.64*   GLC 66* 75 90 68*     [email or web address](phos:4)  CBC)  Recent Labs   Lab 05/16/24  0832 05/14/24  0805   WBC 3.4* 4.0   HGB 10.0* 10.2*   HCT 32.2* 32.4*   MCV 99 96    184     Recent Labs   Lab 05/16/24  0832   AST 13   ALT 9   ALKPHOS 74   BILITOTAL 0.2

## 2024-05-16 NOTE — ANESTHESIA POSTPROCEDURE EVALUATION
Patient: Shilrey Hendricks    Procedure: Procedure(s):  IRRIGATION AND DEBRIDEMENT OF RIGHT ABOVE KNEE AMPUTATION  PLACEMENT OF WOUND VAC TO RIGHT ABOVE KNEE AMPUTATION       Anesthesia Type:  General    Note:     Postop Pain Control: Uneventful            Sign Out: Well controlled pain   PONV: No   Neuro/Psych: Uneventful            Sign Out: Acceptable/Baseline neuro status   Airway/Respiratory: Uneventful            Sign Out: Acceptable/Baseline resp. status   CV/Hemodynamics: Uneventful            Sign Out: Acceptable CV status   Other NRE: NONE   DID A NON-ROUTINE EVENT OCCUR?            Last vitals:  Vitals Value Taken Time   /100 05/16/24 1612   Temp 36.6  C (97.9  F) 05/16/24 1602   Pulse 71 05/16/24 1615   Resp 7 05/16/24 1615   SpO2 97 % 05/16/24 1615   Vitals shown include unfiled device data.    Electronically Signed By: Tay Willett MD  May 16, 2024  4:16 PM

## 2024-05-16 NOTE — ANESTHESIA PROCEDURE NOTES
Airway    Staff -        Anesthesiologist:  Leigh Perdomo MD       CRNA: Marleen Verdugo APRN CRNA       Performed By: CRNA  Consent for Airway        Urgency: elective  Indications and Patient Condition       Indications for airway management: lydia-procedural       Induction type:intravenous       Mask difficulty assessment: 0 - not attempted    Final Airway Details       Final airway type: supraglottic airway    Supraglottic Airway Details        Type: LMA       Brand: I-Gel       LMA size: 4    Post intubation assessment        Placement verified by: capnometry, equal breath sounds and chest rise        Number of attempts at approach: 1       Number of other approaches attempted: 0       Ease of procedure: easy       Dentition: Intact and Unchanged
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
## 2024-05-16 NOTE — BRIEF OP NOTE
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]  Post-operative diagnosis Same as pre-operative diagnosis    Procedure: IRRIGATION AND DEBRIDEMENT OF RIGHT ABOVE KNEE AMPUTATION, Right - Leg  PLACEMENT OF WOUND VAC TO RIGHT ABOVE KNEE AMPUTATION, Right - Update    Surgeon: Surgeons and Role:     * Tay Enciso MD - Primary     * Gala Lo MD - Resident - Assisting  Anesthesia: General   Estimated Blood Loss: Less than 10 ml    Drains: None  Specimens:   ID Type Source Tests Collected by Time Destination   A : right thigh wound Tissue Thigh, Right ANAEROBIC BACTERIAL CULTURE ROUTINE, GRAM STAIN, AEROBIC BACTERIAL CULTURE ROUTINE Tay Enciso MD 5/16/2024  3:26 PM      Findings:   No gross evidence of infection, no purulence. Devascularized skin removed, area with fascia rudolph exposed, more anterior area with more fat  covering fascia rudolph. Fascial closure distal stump incision in tact. [id number] deep wound .  Complications: None.  Implants: * No implants in log *

## 2024-05-16 NOTE — ANESTHESIA CARE TRANSFER NOTE
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    Anesthesia Type:   General     Note:    Oropharynx: oropharynx clear of all foreign objects  Level of Consciousness: awake  Oxygen Supplementation: nasal cannula  Level of Supplemental Oxygen (L/min / FiO2): 4  Independent Airway: airway patency satisfactory and stable  Dentition: dentition unchanged  Vital Signs Stable: post-procedure vital signs reviewed and stable  Report to RN Given: handoff report given  Patient transferred to: PACU    Handoff Report: Identifed the Patient, Identified the Reponsible Provider, Reviewed the pertinent medical history, Discussed the surgical course, Reviewed Intra-OP anesthesia mangement and issues during anesthesia, Set expectations for post-procedure period and Allowed opportunity for questions and acknowledgement of understanding      Vitals:  Vitals Value Taken Time   /84 05/16/24 1601   Temp     Pulse 74 05/16/24 1607   Resp 11 05/16/24 1607   SpO2 98 % 05/16/24 1607   Vitals shown include unfiled device data.    Electronically Signed By: NOELLE Islas CRNA  May 16, 2024  4:08 PM

## 2024-05-16 NOTE — PLAN OF CARE
Diagnosis: R AKA wound dehiscence  Mental Status: A&Ox4  Activity/dangle up 1 pivot  Diet: NPO  Pain: tylenol  Alvarez/Voiding: incontinent B&B  02/LDA: RA. IV SL  D/C Date: pending  Other Info: R port dialysis, wound coccyx, nephrology consult. Dialysis [facility]

## 2024-05-16 NOTE — PROGRESS NOTES
As suggested by Dr. Hodges, she may be in process of recovery.  Due for HD today and going to OR this afternoon.  As I do not have labs yet today, I will plan on HD this AM before surgery this afternoon.    Will assess for HD needs day by day.      Jacobo Torre MD

## 2024-05-16 NOTE — PROGRESS NOTES
Potassium   Date Value Ref Range Status   05/16/2024 4.7 3.4 - 5.3 mmol/L Final   12/29/2022 4.3 3.4 - 5.3 mmol/L Final   07/09/2021 5.2 3.4 - 5.3 mmol/L Final     Hemoglobin   Date Value Ref Range Status   05/16/2024 10.0 (L) 11.7 - 15.7 g/dL Final   07/09/2021 8.8 (L) 11.7 - 15.7 g/dL Final     Creatinine   Date Value Ref Range Status   05/16/2024 2.76 (H) 0.51 - 0.95 mg/dL Final   07/09/2021 0.77 0.52 - 1.04 mg/dL Final     Urea Nitrogen   Date Value Ref Range Status   05/16/2024 24.5 (H) 8.0 - 23.0 mg/dL Final   12/29/2022 17 7 - 30 mg/dL Final   07/09/2021 13 7 - 30 mg/dL Final     Sodium   Date Value Ref Range Status   05/16/2024 133 (L) 135 - 145 mmol/L Final     Comment:     Reference intervals for this test were updated on 09/26/2023 to more accurately reflect our healthy population. There may be differences in the flagging of prior results with similar values performed with this method. Interpretation of those prior results can be made in the context of the updated reference intervals.    07/09/2021 132 (L) 133 - 144 mmol/L Final     INR   Date Value Ref Range Status   04/11/2024 1.31 (H) 0.85 - 1.15 Final   09/24/2020 1.01 0.86 - 1.14 Final       DIALYSIS PROCEDURE NOTE  Hepatitis status of previous patient on machine log was checked and verified ok to use with this patients hepatitis status.  Patient dialyzed for 3 hrs. on a K3 bath with a net fluid removal of  1L.  A BFR of 400 ml/min was obtained via a CVC.  The treatment plan was discussed with Dr. Torre during the treatment.    Total heparin received during the treatment: 0 units.     Line flushed, clamped and capped with heparin 1:1000 1.6 mL (1600 units) per lumen    Meds  given: none   Complications: none      Person educated: patient. Knowledge base moderate. Barriers to learning: none. Educated on procedure via verbal mode. Patient verbalized understanding.     ICEBOAT? Timeout performed pre-treatment  I: Patient was identified using 2  identifiers  C:  Consent Signed Yes  E: Equipment preventative maintenance is current and dialysis delivery system OK to use  B: Hepatitis B    Latest Reference Range & Units 04/16/24 06:42 05/07/24 16:58   Hep B Surface Agn Nonreactive   Nonreactive   Hepatitis B Core Wen Nonreactive  Nonreactive    Hepatitis B Surface Antibody Instrument Value <8.5 m[IU]/mL  <3.50   Hepatitis B Surface Antibody   Nonreactive     O: Dialysis orders present and complete prior to treatment  A: Vascular access verified and assessed prior to treatment  T: Treatment was performed at a clinically appropriate time  ?: Patient was allowed to ask questions and address concerns prior to treatment  See Adult Hemodialysis flowsheet in [email or web address] for further details and post assessment.  Machine water alarm in place and functioning. Transducer pods intact and checked every 15min.   Pt straight to pre op from dialysis suite.  Chlorine/Chloramine water system checked every 4 hours.    Post treatment report given to LELA Stokes and pre op RN, RN

## 2024-05-16 NOTE — ANESTHESIA PREPROCEDURE EVALUATION
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 clot    Post-menopausal     Reflux esophagitis 2004    EGD: esophagitis and medium HH    SBO (small bowel obstruction) (H) 08/10/2021    SVT (supraventricular tachycardia) (H24)     Thrombocytopenia (H24)     Uncomplicated asthma     Vitamin C deficiency 2018      Past Surgical History:   Procedure Laterality Date    AMPUTATE LEG ABOVE KNEE N/A 2024    Procedure: AMPUTATION, ABOVE KNEE right leg with wound vac dressing, excision of anteriotibial bypass graft, closure of (previous interventional radiology) left groin incision;  Surgeon: José Luis Hernandez MD;  Location:  OR    AMPUTATE LEG ABOVE KNEE Right 2024    Procedure: COMPLETION RIGHT ABOVE KNEE AMPUTATION;  Surgeon: José Luis Hernandez MD;  Location:  OR    ANGIOGRAM      ANGIOGRAM Right 2021    Procedure: RIGHT LOWER EXTREMITY ANGIOGRAM WITH LEFT BRACHIAL  ARTERY CUTDOWN;  Surgeon: José Luis Hernandez MD;  Location:  OR    BIOPSY MASS NECK Right 10/11/2023    Procedure: Right Parotid Biopsy;  Surgeon: Randal Mendoza MD;  Location:  OR    BYPASS GRAFT FEMOROPOPLITEAL Right 09/23/2020    Procedure: RIGHT FEMORAL GRAFT TO ABOVE-KNEE POPLITEAL BYPASS USING CADAVERIC SUPERFICIAL FEMORAL ARTERY;  Surgeon: Chadwick Lozano MD;  Location:  OR    BYPASS GRAFT FEMOROPOPLITEAL Right 2/15/2022    Procedure: RIGHT SUPERFICIAL FEMORAL ARTERY GRAFT TO BELOW KNEE POPLITEAL BYPASS WITH CADAVERIC CRYOLIFE  FEMORAL-POPLITEAL ARTERY SIZE: OUTER DIAMETER: 7MM - 6MM;  Surgeon: Chadwick Lozano;  Location:  OR    BYPASS GRAFT FEMOROPOPLITEAL Right 5/26/2023    Procedure: RIGHT DISTAL SUPERFICIAL FEMORAL GRAFT TO ANTERIOR TIBIAL ARTERY WITH 26 CENTIMETER CADAVERIC SUPERFICIAL FEMORAL ARTERY GRAFT;  Surgeon: Chadwick Lozano MD;  Location:  OR    BYPASS GRAFT FEMOROPOPLITEAL Right 10/11/2023    Procedure: RIGHT FEMORAL TO POPLITEAL GRAFT THROMBECTOMY;  Surgeon: Chadwick Lozano MD;  Location:  OR    BYPASS GRAFT INSITU FEMOROPOPLITEAL Right 7/7/2021    Procedure: CREATION RIGHT FEMORAL TO POPLITEAL ARTERIAL BYPASS USING INSITU VEIN GRAFT;  Surgeon: José Luis Hernandez MD;  Location:  OR    CARDIAC CATHERIZATION  09/03/2009    multivessel CAD without target lesions, med mgmt indicated, preserved ef    CARPAL TUNNEL RELEASE RT/LT Right 05/20/2021    COLONOSCOPY N/A 12/12/2023    Procedure: Colonoscopy;  Surgeon: Corey Hoffman MD;  Location:  GI    COLONOSCOPY N/A 12/14/2023    Procedure: Colonoscopy;  Surgeon: Corey Hoffman MD;  Location:  GI    CV CORONARY ANGIOGRAM N/A 10/4/2023    Procedure: Coronary Angiogram;  Surgeon: Rogelio Ricks MD;  Location:  HEART CARDIAC CATH LAB    CV PCI N/A 10/4/2023    Procedure: Percutaneous Coronary Intervention;  Surgeon: Rogelio Ricks MD;  Location:  HEART  CARDIAC CATH LAB    EMBOLECTOMY LOWER EXTREMITY Right 10/6/2023    Procedure: Right anterior tibial bypass with graft, Right tibial endarterectomy,thrombectomy, Right doraslis pedis thrombectomy, Anterior Tibial vein patch.;  Surgeon: Chadwick Lozano MD;  Location:  OR    ENDARTERECTOMY CAROTID Right 05/11/2016    Procedure: ENDARTERECTOMY CAROTID;  Surgeon: Chadwick Lozano MD;  Location:  OR    ENDARTERECTOMY CAROTID Left 06/08/2020    Procedure: LEFT CAROTID ENDARTERECTOMY with distal facal vein patch  and EEG;  Surgeon: Chadwick Lozano MD;  Location:  OR    ESOPHAGOSCOPY, GASTROSCOPY, DUODENOSCOPY (EGD), COMBINED N/A 12/12/2023    Procedure: Esophagoscopy, gastroscopy, duodenoscopy (EGD), combined;  Surgeon: Corey Hoffman MD;  Location:  GI    FINE NEEDLE ASPIRATION WITHOUT IMAGING GUIDANCE Right 9/22/2023    Procedure: Fine needle aspiration without imaging guidance;  Surgeon: Kiersten Aguilera MD;  Location:  GI    FLUORESCENCE INTRAOPERATIVE VASCULAR ANGIOGRAPHY Right 12/28/2022    Procedure: RIGHT LEG ANGIOGRAM with intervention;  Surgeon: Chadwick Lozano MD;  Location:  OR    GYN SURGERY  left tube    left salpingectomy    IR ANGIOGRAM THROUGH CATHETER (ARTERIAL)  10/6/2023    IR ANGIOGRAM THROUGH CATHETER (ARTERIAL)  10/6/2023    IR ANGIOGRAM THROUGH CATHETER (ARTERIAL)  3/31/2024    IR ANGIOGRAM THROUGH CATHETER (ARTERIAL)  3/30/2024    IR CVC TUNNEL PLACEMENT > 5 YRS OF AGE  4/3/2024    IR CVC TUNNEL REVISION RIGHT  4/15/2024    IR LOWER EXTREMITY ANGIOGRAM RIGHT  05/25/2021    IR LOWER EXTREMITY ANGIOGRAM RIGHT  10/5/2023    IR LOWER EXTREMITY ANGIOGRAM RIGHT  3/29/2024    IR OR ANGIOGRAM  6/23/2021    IR OR ANGIOGRAM  12/28/2022    LAPAROSCOPIC CHOLECYSTECTOMY N/A 09/27/2017    Procedure: LAPAROSCOPIC CHOLECYSTECTOMY;  LAPAROSCOPIC CHOLECYSTECTOMY;  Surgeon: Jacoby Tapia MD;  Location: [facility]    LAPAROSCOPY DIAGNOSTIC (GENERAL) N/A  8/11/2021    Procedure: Exploratory laparoscopy,  laparoscopic lysis of adhesions, laparotomy;  Surgeon: Corina Ferreira MD;  Location:  OR    OR ANGIOGRAM, LOWER EXTREMITY Right 10/11/2023    Procedure: Or Angiogram, Lower Extremity;  Surgeon: Chadwick Lozano MD;  Location:  OR    ORTHOPEDIC SURGERY      left knee surgery    REPAIR ANEURYSM FEMORAL ARTERY Left 12/28/2022    Procedure: REPAIR LEFT FEMORAL PSEUDOANEURYSM;  Surgeon: Chadwick Lozano MD;  Location:  OR    VASCULAR SURGERY  aoto bi fem  left fem-AK pop    [facility] FABRIC WRAPPING OF ABDOMINAL ANEURYSM      [facility] NONSPECIFIC PROCEDURE  12/2000    angioplasty with stent right fem. a.    [facility] NONSPECIFIC PROCEDURE  1987    sinus surgery    [facility] NONSPECIFIC PROCEDURE  09/02/2009    Emergent left groin exploration with oversewing of bleeding angiographic site. 2. Endarterectomy of common femoral-proximal superficial femoral artery with greater saphenous vein patch angioplasty.   a. [place] of accessory right greater saphenous vein.     [facility] NONSPECIFIC PROCEDURE  08/27/2009    occluded left common iliac and external iliac arteries were successfully revascularized with stenting to 8 and 7 mm       Allergies   Allergen Reactions    Pantoprazole      Protonix caused diffuse edema    Chantix [Varenicline]      Terrible dreams    Contrast Dye Swelling     Patient reports facial and throat swelling with prior CT contrast.    Penicillins Itching and Rash      Social History     Tobacco Use    Smoking status: Some Days     Current packs/day: 0.25     Average packs/day: 0.3 packs/day for 56.4 years (14.1 ttl pk-yrs)     Types: Cigarettes     Start date: 1968    Smokeless tobacco: Never    Tobacco comments:     1/2 PPD   Substance Use Topics    Alcohol use: Not Currently     Comment: x1-2 yr      Wt Readings from Last 1 Encounters:   05/16/24 56 kg (123 lb 7.3 oz)        Anesthesia Evaluation   Pt has had prior anesthetic. Type: General.    No history of  anesthetic complications       ROS/MED HX  ENT/Pulmonary:     (+)           allergic rhinitis,     tobacco use, Current use,     asthma    COPD, O2 dependent,          (-) sleep apnea   Neurologic:    (-) no CVA and no TIA   Cardiovascular: Comment: svt    (+) Dyslipidemia hypertension- Peripheral Vascular Disease-  CAD - past MI - -   Taking blood thinners   CHF                  dysrhythmias, Other,        Previous cardiac testing   Echo: Date: 11/2023 Results:  Limited Echo Adult.     ______________________________________________________________________________  Interpretation Summary     The left ventricle is normal in structure, function and size.  The visual ejection fraction is 55-60%.  The right ventricle is normal in structure, function and size.  There is moderate trileaflet aortic sclerosis.  ______________________________________________________________________________  Left Ventricle  The left ventricle is normal in structure, function and size. There is normal  left ventricular wall thickness. Left ventricular systolic function is normal.  The visual ejection fraction is 55-60%. Normal left ventricular wall motion.     Right Ventricle  The right ventricle is normal in structure, function and size.     Atria  Normal left atrial size. Right atrial size is normal. There is no color  Doppler evidence of an atrial shunt.     Mitral Valve  The mitral valve leaflets appear thickened, but open well. There is trace  mitral regurgitation.     Tricuspid Valve  The tricuspid valve is normal in structure and function. There is trace  tricuspid regurgitation. Right ventricular systolic pressure could not be  approximated due to inadequate tricuspid regurgitation.     Aortic Valve  There is moderate trileaflet aortic sclerosis. No aortic regurgitation is  present.     Pulmonic Valve  The pulmonic valve is not well seen, but is grossly normal.     Pericardium  There is no pericardial effusion.     Rhythm  Sinus rhythm  was noted.      Stress Test:  Date:  Results:  Name: KASIA WAN  MRN: [id number]  : 1960  Study Date: 2021 12:57 PM  Age: 60 yrs  Gender: Female  Patient [facility]: [facility]  Reason For Study: Light headedness  Ordering Physician: JACKIE DOWNEY  Referring Physician: Vasquez Benoit  Performed By: Corey James RDCS     BSA: 1.5 m2  Height: 62 in  Weight: 112 lb  HR: 57  BP: 140/78 mmHg  ______________________________________________________________________________  Procedure  Stress Echo Complete.     ______________________________________________________________________________  Interpretation Summary     Suboptimal stress test due to inadequate maximum heart rate.  Normal left ventricular function and wall motion at rest and post-stress but  LV size and function were unchanged following limited exercise of 3 minutes.  Post-exercise images were obtained with the heart rate in the 70's bpm.  Blood pressure seth appropriately from 140/78 mmHg (supine, baseline) to  158/80 mmHg during Stage I and fell to 134/74 mmHg in recovery. Consider  Lexiscan stress imaging (nuclear, MRI) for adequate testing.  ______________________________________________________________________________  Stress  Exercise was stopped due to fatigue.  There was a normal BP response to exercise.  Target Heart Rate was not achieved due to fatigue.  Suboptimal stress test due to inadequate maximum heart rate.  J point/ST elevation at rest pseudonormalized with exercise.  There was no arrhythmia.  The Duke treadmill score was intermediate risk ( -11< Sterling score <5).  Normal left ventricular function and wall motion at rest and post-stress.  The visual ejection fraction is estimated at 60-65%.     Baseline  The patient is in normal sinus rhythm.  Baseline ECG displays Q waves in the mike-septal leads.  Elevated J point V3-V6, inferior leads - most C/W early repolarization.  Normal left ventricular function and wall  motion at rest and post-stress.  The visual ejection fraction is estimated at 60-65%.     Stress Results         Protocol:  Eliezer Protocol        Maximum Predicted HR:   158 bpm         Target HR: 134 bpm               % Maximum Predicted HR: 66 %        Stage  DurationHeart Rate  BP                    Comment             (mm:ss)   (bpm)    Stage 1   3:00      104   158/80Patient requested to stop   RecoveryR  4:00      56    134/74RPP = 24909, Sterling = 2, FAC = Below average             Stress Duration:   3:00 mm:ss        Recovery Time: 4:00 mm:ss          Maximum Stress HR: 104 bpm           METS:          4     Mitral Valve  There is no mitral regurgitation noted.     Tricuspid Valve  No tricuspid regurgitation.     Aortic Valve  No aortic regurgitation is present. No hemodynamically significant valvular  aortic stenosis.  ______________________________________________________________________________  MMode/2D Measurements & Calculations  IVSd: 1.00 cm     LVIDd: 4.0 cm  LVIDs: 2.8 cm  LVPWd: 0.73 cm  ECG Reviewed:  Date: Results:    Cath:  Date: 10/4/23 Results:     Prox RCA lesion is 45% stenosed.     RPAV-1 lesion is 30% stenosed.     RPAV-2 lesion is 50% stenosed.     1st Mrg lesion is 45% stenosed.     1st Diag lesion is 100% stenosed.     Dist Cx lesion is 55% stenosed.     1. Right dominant coronary anatomy.  2. Completely occluded (probably chronic) D1 with left to left collaterals from distal LAD to D1.  3. Moderate coronary artery disease involving proximal to mid RCA which is not hemodynamically significant-IFR negative.  4. Moderate coronary disease involving distal small-caliber RPL and distal circumflex artery.   (-) valvular problems/murmurs   METS/Exercise Tolerance: 3 - Able to walk 1-2 blocks without stopping    Hematologic: Comments: Lupus; thrombocytopenia    (+) History of blood clots,     anemia, history of blood transfusion,         Musculoskeletal: Comment: Charcot Breonna Tooth - neg  musculoskeletal ROS     GI/Hepatic:     (+) GERD,                (-) liver disease   Renal/Genitourinary:    (-) renal disease   Endo:    (-) Type II DM, thyroid disease and obesity   Psychiatric/Substance Use:     (+) psychiatric history anxiety and depression   Recreational drug usage: Cannabis.    Infectious Disease:  - neg infectious disease ROS     Malignancy:  - neg malignancy ROS     Other:  - neg other ROS          Physical Exam    Airway        Mallampati: II   TM distance: > 3 FB   Neck ROM: full   Mouth opening: > 3 cm    Respiratory Devices and Support         Dental  no notable dental history     (+) Minor Abnormalities - some fillings, tiny chips      Cardiovascular   cardiovascular exam normal          Pulmonary   pulmonary exam normal                OUTSIDE LABS:  CBC:   Lab Results   Component Value Date    WBC 3.4 (L) 05/16/2024    WBC 4.0 05/14/2024    HGB 10.0 (L) 05/16/2024    HGB 10.2 (L) 05/14/2024    HCT 32.2 (L) 05/16/2024    HCT 32.4 (L) 05/14/2024     05/16/2024     05/14/2024     BMP:   Lab Results   Component Value Date     (L) 05/16/2024     (L) 05/15/2024    POTASSIUM 4.7 05/16/2024    POTASSIUM 4.4 05/15/2024    CHLORIDE 101 05/16/2024    CHLORIDE 99 05/15/2024    CO2 23 05/16/2024    CO2 24 05/15/2024    BUN 24.5 (H) 05/16/2024    BUN 15.5 05/15/2024    CR 2.76 (H) 05/16/2024    CR 2.07 (H) 05/15/2024    GLC 58 (L) 05/16/2024    GLC 74 05/16/2024     COAGS:   Lab Results   Component Value Date    PTT 76 (H) 12/15/2023    INR 1.31 (H) 04/11/2024    FIBR 185 03/31/2024     POC:   Lab Results   Component Value Date    BGM 87 07/08/2021     HEPATIC:   Lab Results   Component Value Date    ALBUMIN 1.9 (L) 05/16/2024    PROTTOTAL 4.2 (L) 05/16/2024    ALT 9 05/16/2024    AST 13 05/16/2024    ALKPHOS 74 05/16/2024    BILITOTAL 0.2 05/16/2024     OTHER:   Lab Results   Component Value Date    PH 7.19 (LL) 03/31/2024    LACT 0.8 04/04/2024    A1C 5.2 01/08/2024     SONIA 7.5 (L) 05/16/2024    PHOS 2.7 05/15/2024    MAG 1.8 05/14/2024    LIPASE 132 08/10/2021    AMYLASE 46 08/14/2017    TSH 2.53 04/10/2024    T4 1.19 03/28/2017    CRP <2.9 09/18/2014    SED 39 (H) 11/13/2023       Anesthesia Plan    ASA Status:  3    NPO Status:  NPO Appropriate    Anesthesia Type: General.     - Airway: LMA   Induction: Propofol, Intravenous.   Maintenance: Balanced.        Consents    Anesthesia Plan(s) and associated risks, benefits, and realistic alternatives discussed. Questions answered and patient/representative(s) expressed understanding.     - Discussed:     - Discussed with:  Patient            Postoperative Care    Pain management: Multi-modal analgesia.   PONV prophylaxis: Ondansetron (or other 5HT-3), Dexamethasone or Solumedrol, Background Propofol Infusion     Comments:               Leigh Perdomo MD, MD    I have reviewed the pertinent notes and labs in the chart from the past 30 days and (re)examined the patient.  Any updates or changes from those notes are reflected in this note.     # Hyponatremia: Lowest Na = 122 mmol/L in last 30 days, will monitor as appropriate    # Hypomagnesemia: Lowest Mg = 1.4 mg/dL in last 30 days, will replace as needed   # Hypoalbuminemia: Lowest albumin = 1.8 g/dL at 5/15/2024  5:42 AM, will monitor as appropriate   # Drug Induced Coagulation Defect: home medication list includes an anticoagulant medication  # Drug Induced Platelet Defect: home medication list includes an antiplatelet medication

## 2024-05-16 NOTE — PROGRESS NOTES
[facility]    Hospitalist Progress Note    Date of Admission:  5/15/2024    Assessment & Plan     Shirley Hendricks is a 63 year old female with complex medical history which includes anxiety, Charcot- Breonna Tooth disease, GERD, hypertension, hyperlipidemia, lupus, severe peripheral artery disease, GERD, supraventricular tachycardia, asthma, who was recently admitted in the hospital from 3/29/2024 to 4/22/2024 with right ischemic limb, severe peripheral artery disease, ultimately underwent above-knee amputation on the right side, course complicated by acute renal failure, rhabdomyolysis, sepsis, acute blood loss anemia, delirium, diarrhea and retroperitoneal hematoma. She was eventually discharged to acute rehab, now presenting because of wound dehiscence and necrosis that needs debridement.  She has had  recent numerous redo bypass procedures of RLE for CLTI.    S/p right above-knee amputation  Wound dehiscence and necrosis with mild erythema  Severe peripheral artery disease  She is now admitted to the hospital as per vascular surgery recommendation for her right stump wound and necrosis, scheduled to have debridement of her wound on 5/16 by vascular surgery.  Consult vascular surgery, keep n.p.o.   Resume Plavix 75 mg daily and Crestor 10 mg after surgery  Previously she was on anticoagulation with Xarelto that was discontinued.  Further management of her wound as per vascular surgery.     Acute renal failure: Dialysis dependent  Recent hospital course was complicated with renal failure, secondary to recurrent rhabdomyolysis and ischemic injury  She is dialysis dependent, currently on hemodialysis Tuesday Thursday and Saturday.  Nephrology consulted for dialysis     Anemia of chronic kidney disease  Patient has history of acute blood loss anemia, with retroperitoneal hematoma and surgeries.  Hemoglobin stable around 10 at this time.  No active bleeding at this time      Hypertension  Hyperlipidemia  History of coronary artery disease  No active chest pain at this time, blood pressure slightly on the higher side.  She is on amlodipine 10 mg daily, Coreg 6.25 mg twice daily, as needed hydralazine  and Bumex 4 mg 3-4 times a week.  All meds continued.       Possible celiac disease  History of loose stools  Patient is noncompliant with gluten-free diet.  Mild abdominal tenderness on exam.  At some point need to follow-up with the GI and counseling.     Asthma  GERD  No acute exacerbation of asthma, continue PTA Breo Ellipta   Continue PTA Pepcid 20 mg daily.     History of diabetes mellitus type 2  Last hemoglobin A1c was 5.2  Her Jardiance was discontinued.  She has had low blood sugars going down to 50s/60s range.  Placed on hypoglycemia protocol.  Currently on diet control     History of B-cell lymphoma  Followed by [place] [facility]  Continue outpatient surveillance and follow-up with oncology.     Hyponatremia  This is likely secondary to renal failure.  Nephrology to follow,  ultrafiltrate with dialysis.     DVT Prophylaxis: Pneumatic Compression Devices  Code Status: Full Code     Medically Ready for Discharge: Anticipated in 2-4 Days, pending clearance from consultants.        Medical Decision Making       Please see A&P for additional details of medical decision making.        Clinically Significant Risk Factors Present on Admission              # Hypoalbuminemia: Lowest albumin = 1.8 g/dL at 5/15/2024  5:42 AM, will monitor as appropriate  # Drug Induced Coagulation Defect: home medication list includes an anticoagulant medication  # Drug Induced Platelet Defect: home medication list includes an antiplatelet medication   # Hypertension: Noted on problem list          # Financial/Environmental Concerns:             Clarisse Alfaro MD  Text Page (7am - 6pm, M-F)  [facility] [place]  Securely message with the Vocera Web Console (learn more here)  Text page  via [facility] Paging/Directory      Interval History   Patient was seen at dialysis.  She complained of feeling quite dry in her mouth and was unhappy with being kept n.p.o. since midnight for surgery this afternoon.  She did not complain of significant pain.  No fevers or chills.  No shortness of breath.    -Data reviewed today: I reviewed all new labs and imaging results over the last 24 hours. I personally reviewed labs    Physical Exam   Temp: 98.6  F (37  C) Temp src: Oral BP: (!) 175/113 Pulse: 70   Resp: 18 SpO2: 94 % O2 Device: None (Room air)    Vitals:    05/16/24 0647   Weight: 56 kg (123 lb 7.3 oz)     Vital Signs with Ranges  Temp:  [97.7  F (36.5  C)-98.6  F (37  C)] 98.6  F (37  C)  Pulse:  [64-70] 70  Resp:  [18] 18  BP: (135-180)/() 175/113  SpO2:  [94 %-98 %] 94 %  No intake/output data recorded.    Constitutional: Alert, appears comfortable, in no acute distress  Respiratory: Non labored breathing, clear to auscultation bilaterally, no crackles or wheezes  Cardiovascular: Heart sounds regular rate and rhythm, no murmurs  GI: Abdomen is soft, non distended, non tender. Normal BS  MSK: Right AKA stump noted with dressing on.  Neuro: alert, converses appropriately, moving all extremities, fluent speech, no facial asymmetry  Psych: mood and affect appropriate      Medications   Current Facility-Administered Medications   Medication Dose Route Frequency Provider Last Rate Last Admin     Current Facility-Administered Medications   Medication Dose Route Frequency Provider Last Rate Last Admin    - MEDICATION INSTRUCTIONS for Dialysis Patients -   Does not apply See Admin Instructions Yanira Mario, [facility]        amLODIPine (NORVASC) tablet 10 mg  10 mg Oral Daily Manuel Robledo MD        [START ON 5/17/2024] bumetanide (BUMEX) tablet 4 mg  4 mg Oral Once per day on Sunday Monday Wednesday Friday Manuel Robledo MD        carvedilol (COREG) tablet 6.25 mg  6.25 mg Oral BID w/meals Manuel Robledo  MD Jaret   6.25 mg at 05/15/24 1903    cetirizine (zyrTEC) tablet 5 mg  5 mg Oral Daily Manuel Robledo MD        [Held by provider] clopidogrel (PLAVIX) tablet 75 mg  75 mg Oral Daily Manuel Robledo MD        epoetin mireya-epbx (RETACRIT) injection 4,000 Units  4,000 Units Intravenous Once in dialysis/CRRT Vineet Hodges MD        famotidine (PEPCID) tablet 20 mg  20 mg Oral Q48H Manuel Robledo MD        fluticasone-vilanterol (BREO ELLIPTA) 200-25 MCG/ACT inhaler 1 puff  1 puff Inhalation Daily Manuel Robledo MD        gabapentin (NEURONTIN) capsule 200 mg  200 mg Oral Once per day on Tuesday Thursday Saturday Manuel Robledo MD        lactobacillus rhamnosus (GG) (CULTURELL) capsule 1 capsule  1 capsule Oral BID Manuel Robledo MD   1 capsule at 05/15/24 2043    miconazole (MICATIN) 2 % powder   Topical BID Manuel Robledo MD   Given at 05/15/24 2044    multivitamin RENAL (TRIPHROCAPS) capsule 1 capsule  1 capsule Oral Daily Manuel Robledo MD        nicotine (NICODERM CQ) 14 MG/24HR 24 hr patch 1 patch  1 patch Transdermal Daily Manuel Robledo MD        No heparin via hemodialysis machine   Does not apply Once Vineet Hodges MD        polyethylene glycol (MIRALAX) Packet 17 g  17 g Oral Daily Manuel Robledo MD        rosuvastatin (CRESTOR) tablet 10 mg  10 mg Oral At Bedtime Manuel Robledo MD   10 mg at 05/15/24 2140    senna-docusate (SENOKOT-S/PERICOLACE) 8.6-50 MG per tablet 1-2 tablet  1-2 tablet Oral BID Manuel Robledo MD        silver sulfADIAZINE (SILVADENE) 1 % cream   Topical Daily Manuel Robledo MD        sodium chloride (PF) 0.9% PF flush 3 mL  3 mL Intracatheter Q8H Manuel Robledo MD   3 mL at 05/15/24 2142    sodium chloride (PF) 0.9% PF flush 9 mL  9 mL Intracatheter During Dialysis/CRRT (from stock) Vineet Hodges MD        sodium chloride (PF) 0.9% PF flush 9 mL  9 mL Intracatheter During Dialysis/CRRT  (from stock) Vineet Hodges MD        sodium chloride 0.9% BOLUS 250 mL  250 mL Intravenous Once in dialysis/CRRT Vineet Hodges MD        sodium chloride 0.9% BOLUS 300 mL  300 mL Hemodialysis Machine Once Vineet Hodges MD           Data   Recent Labs   Lab 05/15/24  0542 05/14/24  0805 05/13/24  0706   WBC  --  4.0  --    HGB  --  10.2*  --    MCV  --  96  --    PLT  --  184  --    * 128* 129*   POTASSIUM 4.4 4.4 4.3   CHLORIDE 99 96* 96*   CO2 24 25 25   BUN 15.5 22.5 14.2   CR 2.07* 3.18* 2.64*   ANIONGAP 8 7 8   SONIA 7.4* 7.5* 7.4*   GLC 75 90 68*   ALBUMIN 1.8* 2.0* 1.9*   PROTTOTAL  --  4.3*  --    BILITOTAL  --  0.3  --    ALKPHOS  --  82  --    ALT  --  7  --    AST  --  16  --        Imaging  Recent Results (from the past 24 hour(s))   US Lower Extremity Arterial Duplex Right    Narrative    EXAM: US LOWER EXTREMITY ARTERIAL DUPLEX RIGHT  LOCATION: [facility]  DATE: 5/15/2024    INDICATION: History of right above-the-knee amputation, aortobifemoral bypass graft, SFA occlusion and previous right bypass graft removal. Evaluation for profunda artery origin patency.   COMPARISON: None.  TECHNIQUE: Duplex utilizing 2D gray-scale imaging, Doppler interrogation with color-flow and spectral waveform analysis.    FINDINGS:    RIGHT LOWER EXTREMITY ARTERIAL ASSESSMENT:  Common femoral artery: 56 cm/s, 8.1 mm in diameter  Profunda femoris artery: 564 cm/s. This artery measures 6.6 mm in maximal diameter. However, a focal severe stenosis is seen proximally near its origin measuring approximately 1.5 mm on grayscale images, although visualization is somewhat difficult due   to shadowing from overlying calcified plaques.        Impression    IMPRESSION:    1.  Severe focal atherosclerotic narrowing in the proximal right profunda artery near its origin, with markedly elevated peak systolic flow velocity.

## 2024-05-16 NOTE — OP NOTE
Date of procedure: May 16, 2024    PREOPERATIVE DIAGNOSIS: Eschar of  distal right AKA stump.    POSTOPERATIVE DIAGNOSIS: Eschar distal right AKA stump.    PROCEDURES PERFORMED:  1.  Full-thickness debridement of skin and subcutaneous tissue distal right above-knee amputation stump (16 x 7 x 0.7 cm).    2.  Application of wound VAC to distal right above-knee amputation stump.    SURGEON: Donovan Enciso MD    ASSISTANT: Dr. Gala Lo MD (third-year vascular surgery resident).    ANESTHESIA: LMA general    EBL: 15 cc    OPERATIVE INDICATIONS: This patient is a 63-year-old vasculopath who is status post an aortobifemoral bypass and multiple failed right leg procedures who ultimately required a right above-knee amputation 5 weeks ago.  She has been recovering in an acute rehab unit and has developed a large eschar overlying the anterolateral aspect of the distal right AKA stump.  There is concerns for infection and poor healing of the stump and she has been admitted to [facility] with plans for operative debridement today.    OPERATIVE FINDINGS: She has an easily palpable right femoral pulse.  There is a large eschar on the anterolateral aspect of the distal right AKA stump with dimensions of 16 x 7 cm.  After unroofing this eschar the underlying subcutaneous fat was still viable.  There were areas of exposed quadriceps fascia.  We did not visualize the previously partially resected PTFE graft in the upper aspect of the debridement.  There is some eschar on the medial aspect of the AKA stump closure but the fascial stitches remain intact.  There were no gross signs of infection.      Medial right AKA incisional eschar              Anterolateral eschar of the distal right AKA.  After unroofing eschar dimensions were 16 x 7 x 0.7 cm with viable underlying subcutaneous fat and muscle.  No exposed graft.    OPERATIVE DESCRIPTION: After informed consent was obtained  the patient was brought to the operating room and  placed on the table in a supine position.  LMA general anesthesia was achieved without incident.  Her left above-knee amputation stump was prepped and draped in the usual sterile fashion.  Timeout was called and we verified the patient's identity, the operative site, and proposed procedure.  The eschar was sharply debrided away from the distal right AKA stump using a 10 blade scalpel.  The underlying subcutaneous fatty tissue appeared viable although there were some areas in the more distal aspect of the wound where there was exposed quadriceps fascia.  Curettes were used to remove some of the underlying subcutaneous fat.  Good backbleeding was noted throughout.    Representative swab cultures were performed at base of the wound and sent for Gram stain, aerobes and anaerobes.  The wound was then irrigated with a Pulsavac and 3 L of sterile saline.  Hemostasis for the wound was assured.  The surrounding skin was cleansed and prepped.  We utilized a silver wound VAC sponge.  This was cut to appropriate dimensions and secured to the debrided wound using the sterile adhesive sheets.  Wound dimensions were 16 x 7 x 0.7 cm.  The sponge was connected to the suction apparatus at 125 mmHg suction.  We had excellent sponge contracture with no leak.  We also debrided away the eschar on the medial aspect of the AKA closure.  Underlying fascia was intact.  This area was covered with moistened Aquacel Ag and sterile gauze.  Final sponge and needle count were reported as correct.  The patient tolerated the procedure without incident.  She was returned extubated and hemodynamically stable to recover.    Donovan Enciso MD

## 2024-05-16 NOTE — PROVIDER NOTIFICATION
Paged hospitalist that 8:30am glucose was 66, patient is in dialysis now and per report from HD nurse the blood sugar now is 74.  Patient will be done with dialysis around 1220 and will go straight to pre-op.

## 2024-05-17 ENCOUNTER — APPOINTMENT (OUTPATIENT)
Dept: PHYSICAL THERAPY | Facility: CLINIC | Age: 64
DRG: 464 | End: 2024-05-17
Attending: INTERNAL MEDICINE
Payer: COMMERCIAL

## 2024-05-17 ENCOUNTER — APPOINTMENT (OUTPATIENT)
Dept: OCCUPATIONAL THERAPY | Facility: CLINIC | Age: 64
DRG: 464 | End: 2024-05-17
Attending: INTERNAL MEDICINE
Payer: COMMERCIAL

## 2024-05-17 DIAGNOSIS — T87.9 AKA STUMP COMPLICATION (H): Primary | ICD-10-CM

## 2024-05-17 LAB
ALBUMIN SERPL BCG-MCNC: 2.1 G/DL (ref 3.5–5.2)
ANION GAP SERPL CALCULATED.3IONS-SCNC: 10 MMOL/L (ref 7–15)
BASOPHILS # BLD AUTO: 0 10E3/UL (ref 0–0.2)
BASOPHILS NFR BLD AUTO: 1 %
BUN SERPL-MCNC: 18.5 MG/DL (ref 8–23)
CALCIUM SERPL-MCNC: 7.8 MG/DL (ref 8.8–10.2)
CHLORIDE SERPL-SCNC: 97 MMOL/L (ref 98–107)
CREAT SERPL-MCNC: 2.33 MG/DL (ref 0.51–0.95)
DEPRECATED HCO3 PLAS-SCNC: 26 MMOL/L (ref 22–29)
EGFRCR SERPLBLD CKD-EPI 2021: 23 ML/MIN/1.73M2
EOSINOPHIL # BLD AUTO: 0.1 10E3/UL (ref 0–0.7)
EOSINOPHIL NFR BLD AUTO: 2 %
ERYTHROCYTE [DISTWIDTH] IN BLOOD BY AUTOMATED COUNT: 18.4 % (ref 10–15)
GLUCOSE SERPL-MCNC: 90 MG/DL (ref 70–99)
GLUCOSE SERPL-MCNC: 90 MG/DL (ref 70–99)
HCT VFR BLD AUTO: 33.3 % (ref 35–47)
HGB BLD-MCNC: 10.2 G/DL (ref 11.7–15.7)
IMM GRANULOCYTES # BLD: 0 10E3/UL
IMM GRANULOCYTES NFR BLD: 0 %
LYMPHOCYTES # BLD AUTO: 2.2 10E3/UL (ref 0.8–5.3)
LYMPHOCYTES NFR BLD AUTO: 38 %
MCH RBC QN AUTO: 31 PG (ref 26.5–33)
MCHC RBC AUTO-ENTMCNC: 30.6 G/DL (ref 31.5–36.5)
MCV RBC AUTO: 101 FL (ref 78–100)
MONOCYTES # BLD AUTO: 0.4 10E3/UL (ref 0–1.3)
MONOCYTES NFR BLD AUTO: 7 %
NEUTROPHILS # BLD AUTO: 3 10E3/UL (ref 1.6–8.3)
NEUTROPHILS NFR BLD AUTO: 52 %
NRBC # BLD AUTO: 0 10E3/UL
NRBC BLD AUTO-RTO: 0 /100
PHOSPHATE SERPL-MCNC: 3.9 MG/DL (ref 2.5–4.5)
PLATELET # BLD AUTO: 207 10E3/UL (ref 150–450)
POTASSIUM SERPL-SCNC: 5.1 MMOL/L (ref 3.4–5.3)
RBC # BLD AUTO: 3.29 10E6/UL (ref 3.8–5.2)
SODIUM SERPL-SCNC: 133 MMOL/L (ref 135–145)
WBC # BLD AUTO: 5.8 10E3/UL (ref 4–11)

## 2024-05-17 PROCEDURE — 250N000013 HC RX MED GY IP 250 OP 250 PS 637

## 2024-05-17 PROCEDURE — 99232 SBSQ HOSP IP/OBS MODERATE 35: CPT | Performed by: HOSPITALIST

## 2024-05-17 PROCEDURE — 250N000013 HC RX MED GY IP 250 OP 250 PS 637: Performed by: HOSPITALIST

## 2024-05-17 PROCEDURE — 85004 AUTOMATED DIFF WBC COUNT: CPT

## 2024-05-17 PROCEDURE — 250N000011 HC RX IP 250 OP 636

## 2024-05-17 PROCEDURE — 250N000013 HC RX MED GY IP 250 OP 250 PS 637: Performed by: INTERNAL MEDICINE

## 2024-05-17 PROCEDURE — 84100 ASSAY OF PHOSPHORUS: CPT | Performed by: INTERNAL MEDICINE

## 2024-05-17 PROCEDURE — 97530 THERAPEUTIC ACTIVITIES: CPT | Mod: GP | Performed by: PHYSICAL THERAPIST

## 2024-05-17 PROCEDURE — 82040 ASSAY OF SERUM ALBUMIN: CPT | Performed by: INTERNAL MEDICINE

## 2024-05-17 PROCEDURE — 97530 THERAPEUTIC ACTIVITIES: CPT | Mod: GO

## 2024-05-17 PROCEDURE — 120N000001 HC R&B MED SURG/OB

## 2024-05-17 PROCEDURE — 97165 OT EVAL LOW COMPLEX 30 MIN: CPT | Mod: GO

## 2024-05-17 PROCEDURE — 97162 PT EVAL MOD COMPLEX 30 MIN: CPT | Mod: GP | Performed by: PHYSICAL THERAPIST

## 2024-05-17 PROCEDURE — 36415 COLL VENOUS BLD VENIPUNCTURE: CPT

## 2024-05-17 RX ORDER — ACETAMINOPHEN 325 MG/1
975 TABLET ORAL EVERY 8 HOURS
Status: COMPLETED | OUTPATIENT
Start: 2024-05-17 | End: 2024-05-19

## 2024-05-17 RX ADMIN — Medication 1 CAPSULE: at 13:36

## 2024-05-17 RX ADMIN — FLUTICASONE FUROATE AND VILANTEROL TRIFENATATE 1 PUFF: 200; 25 POWDER RESPIRATORY (INHALATION) at 10:20

## 2024-05-17 RX ADMIN — ROSUVASTATIN CALCIUM 10 MG: 10 TABLET, FILM COATED ORAL at 21:44

## 2024-05-17 RX ADMIN — ACETAMINOPHEN 975 MG: 325 TABLET, FILM COATED ORAL at 10:19

## 2024-05-17 RX ADMIN — CARVEDILOL 6.25 MG: 3.12 TABLET, FILM COATED ORAL at 17:36

## 2024-05-17 RX ADMIN — Medication 1 CAPSULE: at 08:16

## 2024-05-17 RX ADMIN — CETIRIZINE HYDROCHLORIDE 5 MG: 5 TABLET ORAL at 08:16

## 2024-05-17 RX ADMIN — Medication 1 CAPSULE: at 21:44

## 2024-05-17 RX ADMIN — BUMETANIDE 4 MG: 1 TABLET ORAL at 10:24

## 2024-05-17 RX ADMIN — AMLODIPINE BESYLATE 10 MG: 10 TABLET ORAL at 08:16

## 2024-05-17 RX ADMIN — CLOPIDOGREL BISULFATE 75 MG: 75 TABLET ORAL at 10:19

## 2024-05-17 RX ADMIN — OXYCODONE HYDROCHLORIDE 2.5 MG: 5 TABLET ORAL at 23:15

## 2024-05-17 RX ADMIN — ACETAMINOPHEN 325 MG: 325 TABLET, FILM COATED ORAL at 17:45

## 2024-05-17 RX ADMIN — ACETAMINOPHEN 650 MG: 325 TABLET, FILM COATED ORAL at 07:00

## 2024-05-17 RX ADMIN — ACETAMINOPHEN 975 MG: 325 TABLET, FILM COATED ORAL at 21:44

## 2024-05-17 RX ADMIN — ACETAMINOPHEN 975 MG: 325 TABLET, FILM COATED ORAL at 02:41

## 2024-05-17 RX ADMIN — CLINDAMYCIN PHOSPHATE 900 MG: 900 INJECTION, SOLUTION INTRAVENOUS at 06:20

## 2024-05-17 RX ADMIN — NICOTINE 1 PATCH: 14 PATCH, EXTENDED RELEASE TRANSDERMAL at 08:16

## 2024-05-17 RX ADMIN — MICONAZOLE NITRATE: 2 POWDER TOPICAL at 21:45

## 2024-05-17 ASSESSMENT — ACTIVITIES OF DAILY LIVING (ADL)
DEPENDENT_IADLS:: CLEANING;LAUNDRY;SHOPPING;MEAL PREPARATION;TRANSPORTATION
ADLS_ACUITY_SCORE: 50
ADLS_ACUITY_SCORE: 49
ADLS_ACUITY_SCORE: 52
ADLS_ACUITY_SCORE: 57
ADLS_ACUITY_SCORE: 49
ADLS_ACUITY_SCORE: 52
ADLS_ACUITY_SCORE: 52
IADL_COMMENTS: IND IADLS
ADLS_ACUITY_SCORE: 57
ADLS_ACUITY_SCORE: 57
ADLS_ACUITY_SCORE: 53
ADLS_ACUITY_SCORE: 49
ADLS_ACUITY_SCORE: 49
ADLS_ACUITY_SCORE: 50
ADLS_ACUITY_SCORE: 52
ADLS_ACUITY_SCORE: 53
ADLS_ACUITY_SCORE: 53
ADLS_ACUITY_SCORE: 49
ADLS_ACUITY_SCORE: 53
ADLS_ACUITY_SCORE: 57
ADLS_ACUITY_SCORE: 57
ADLS_ACUITY_SCORE: 49
ADLS_ACUITY_SCORE: 49
ADLS_ACUITY_SCORE: 50
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
## 2024-05-17 NOTE — PROGRESS NOTES
[facility]    Hospitalist Progress Note    Date of Admission:  5/15/2024    Assessment & Plan     Shirley Hendricks is a 63 year old female with complex medical history which includes anxiety, Charcot- Breonna Tooth disease, GERD, hypertension, hyperlipidemia, lupus, severe peripheral artery disease, GERD, supraventricular tachycardia, asthma, who was recently admitted in the hospital from 3/29/2024 to 4/22/2024 with right ischemic limb, severe peripheral artery disease, ultimately underwent above-knee amputation on the right side, course complicated by acute renal failure, rhabdomyolysis, sepsis, acute blood loss anemia, delirium, diarrhea and retroperitoneal hematoma. She was eventually discharged to acute rehab, now presenting because of wound dehiscence and necrosis that needs debridement.  She has had  recent numerous redo bypass procedures of RLE for CLTI.    S/p right above-knee amputation  Wound dehiscence and necrosis with mild erythema  Severe peripheral artery disease  She is now admitted to the hospital as per vascular surgery recommendation for her right stump wound and necrosis.  Patient underwent debridement of her wound on 5/16 by vascular surgery.  Postoperative cares per vascular surgery.  Has wound VAC in place to distal right AKA stump.  No signs of infection in stump wound.  Holding off on antibiotics at this time.  Follow culture data.  Dr. Hernandez plans repeat washout and changing of right thigh wound VAC on 5/20.  Resumed Plavix 75 mg daily and Crestor 10 mg after surgery  Previously she was on anticoagulation with Xarelto that was discontinued.     Acute renal failure: Dialysis dependent  Recent hospital course was complicated with renal failure, secondary to recurrent rhabdomyolysis and ischemic injury  She is dialysis dependent, currently on hemodialysis Tuesday Thursday and Saturday.  Nephrology consulted for dialysis     Anemia of chronic kidney disease  Patient  has history of acute blood loss anemia, with retroperitoneal hematoma and surgeries.  Hemoglobin stable around 10 at this time.  No active bleeding at this time     Hypertension  Hyperlipidemia  History of coronary artery disease  No active chest pain at this time, blood pressure slightly on the higher side.  She is on amlodipine 10 mg daily, Coreg 6.25 mg twice daily, as needed hydralazine  and Bumex 4 mg 3-4 times a week.  All meds continued.       Possible celiac disease  History of loose stools  Patient is noncompliant with gluten-free diet.  Mild abdominal tenderness on exam.  At some point need to follow-up with the GI and counseling.     Asthma  GERD  No acute exacerbation of asthma, continue PTA Breo Ellipta   Continue PTA Pepcid 20 mg daily.     History of diabetes mellitus type 2  Last hemoglobin A1c was 5.2  Her Jardiance was discontinued.  She has had low blood sugars going down to 50s/60s range.  Placed on hypoglycemia protocol.  Currently on diet control     History of B-cell lymphoma  Followed by [place] [facility]  Continue outpatient surveillance and follow-up with oncology.     Hyponatremia  This is likely secondary to renal failure.  Nephrology to follow,  ultrafiltrate with dialysis.     DVT Prophylaxis: Pneumatic Compression Devices  Code Status: Full Code     Medically Ready for Discharge: Anticipated in 5+ Days, pending clearance from consultants.        Medical Decision Making       Please see A&P for additional details of medical decision making.        Clinically Significant Risk Factors              # Hypoalbuminemia: Lowest albumin = 1.8 g/dL at 5/15/2024  5:42 AM, will monitor as appropriate       # Hypertension: Noted on problem list            # Financial/Environmental Concerns:             Clarisse Alfaro MD  Text Page (7am - 6pm, M-F)  [facility]  Securely message with the Vocera Web Console (learn more here)  Text page via [employer]  Paging/Directory      Interval History   Patient has been stable overnight.  Pain is controlled on current regimen.  No fevers, chills, sweats.  No nausea, vomiting or diarrhea.    -Data reviewed today: I reviewed all new labs and imaging results over the last 24 hours. I personally reviewed labs    Physical Exam   Temp: 98  F (36.7  C) Temp src: Oral BP: 120/60 Pulse: 67   Resp: 16 SpO2: 94 % O2 Device: None (Room air) Oxygen Delivery: 1 LPM  Vitals:    05/16/24 0647 05/17/24 0500   Weight: 56 kg (123 lb 7.3 oz) 56.2 kg (123 lb 14.4 oz)     Vital Signs with Ranges  Temp:  [97.9  F (36.6  C)-98.3  F (36.8  C)] 98  F (36.7  C)  Pulse:  [59-71] 67  Resp:  [12-20] 16  BP: (120-189)/() 120/60  SpO2:  [89 %-100 %] 94 %  I/O last 3 completed shifts:  In: 150 [I.V.:150]  Out: 1000 [Other:1000]    Constitutional: Alert, appears comfortable, in no acute distress  Respiratory: Non labored breathing, clear to auscultation bilaterally, no crackles or wheezes  Cardiovascular: Heart sounds regular rate and rhythm, no murmurs  GI: Abdomen is soft, non distended, non tender. Normal BS  MSK: Right AKA stump noted with wound VAC in place  Neuro: alert, converses appropriately, moving all extremities, fluent speech, no facial asymmetry  Psych: mood and affect appropriate      Medications   Current Facility-Administered Medications   Medication Dose Route Frequency Provider Last Rate Last Admin     Current Facility-Administered Medications   Medication Dose Route Frequency Provider Last Rate Last Admin    - MEDICATION INSTRUCTIONS for Dialysis Patients -   Does not apply See Admin Instructions Yanira Mario, [facility]        acetaminophen (TYLENOL) tablet 975 mg  975 mg Oral Q8H Gala Lo MD   975 mg at 05/17/24 1019    amLODIPine (NORVASC) tablet 10 mg  10 mg Oral Daily Manuel Robledo MD   10 mg at 05/17/24 0816    bumetanide (BUMEX) tablet 4 mg  4 mg Oral Once per day on Sunday Monday Wednesday Friday Manuel Robledo  MD Jaret   4 mg at 05/17/24 1024    carvedilol (COREG) tablet 6.25 mg  6.25 mg Oral BID w/meals OvianClarisse MD   6.25 mg at 05/16/24 1922    cetirizine (zyrTEC) tablet 5 mg  5 mg Oral Daily Manuel Robledo MD   5 mg at 05/17/24 0816    clopidogrel (PLAVIX) tablet 75 mg  75 mg Oral Daily Gala Lo MD   75 mg at 05/17/24 1019    famotidine (PEPCID) tablet 20 mg  20 mg Oral Q48H Gala Lo MD   20 mg at 05/16/24 1921    Or    famotidine (PEPCID) injection 20 mg  20 mg Intravenous Q48H Gala Lo MD        fluticasone-vilanterol (BREO ELLIPTA) 200-25 MCG/ACT inhaler 1 puff  1 puff Inhalation Daily Manuel Robledo MD   1 puff at 05/17/24 1020    gabapentin (NEURONTIN) capsule 200 mg  200 mg Oral Once per day on Tuesday Thursday Saturday Manuel Robledo MD   200 mg at 05/16/24 2201    lactobacillus rhamnosus (GG) (CULTURELL) capsule 1 capsule  1 capsule Oral BID Manuel Robledo MD   1 capsule at 05/17/24 0816    miconazole (MICATIN) 2 % powder   Topical BID Manuel Robledo MD   Given at 05/15/24 2044    multivitamin RENAL (TRIPHROCAPS) capsule 1 capsule  1 capsule Oral Daily Manuel Robledo MD   1 capsule at 05/17/24 1336    nicotine (NICODERM CQ) 14 MG/24HR 24 hr patch 1 patch  1 patch Transdermal Daily Manuel Robledo MD   1 patch at 05/17/24 0816    polyethylene glycol (MIRALAX) Packet 17 g  17 g Oral Daily Gala Lo MD        rosuvastatin (CRESTOR) tablet 10 mg  10 mg Oral At Bedtime Manuel Robledo MD   10 mg at 05/16/24 2202    [Held by provider] senna-docusate (SENOKOT-S/PERICOLACE) 8.6-50 MG per tablet 1-2 tablet  1-2 tablet Oral BID Manuel Robledo MD        silver sulfADIAZINE (SILVADENE) 1 % cream   Topical Daily Manuel Robledo MD        sodium chloride (PF) 0.9% PF flush 3 mL  3 mL Intracatheter Q8H Gala Lo MD   3 mL at 05/17/24 0818       Data   Recent Labs   Lab 05/17/24  1137 05/16/24  1326 05/16/24  1251  05/16/24  1152 05/16/24  0832 05/15/24  0542 05/14/24  0805   WBC 5.8  --   --   --  3.4*  --  4.0   HGB 10.2*  --   --   --  10.0*  --  10.2*   *  --   --   --  99  --  96     --   --   --  173  --  184   *  --   --   --  133* 131* 128*   POTASSIUM 5.1  --   --   --  4.7 4.4 4.4   CHLORIDE 97*  --   --   --  101 99 96*   CO2 26  --   --   --  23 24 25   BUN 18.5  --   --   --  24.5* 15.5 22.5   CR 2.33*  --   --   --  2.76* 2.07* 3.18*   ANIONGAP 10  --   --   --  9 8 7   SONIA 7.8*  --   --   --  7.5* 7.4* 7.5*   GLC 90  90 107* 58*   < > 66* 75 90   ALBUMIN 2.1*  --   --   --  1.9* 1.8* 2.0*   PROTTOTAL  --   --   --   --  4.2*  --  4.3*   BILITOTAL  --   --   --   --  0.2  --  0.3   ALKPHOS  --   --   --   --  74  --  82   ALT  --   --   --   --  9  --  7   AST  --   --   --   --  13  --  16    < > = values in this interval not displayed.       Imaging  No results found for this or any previous visit (from the past 24 hour(s)).

## 2024-05-17 NOTE — PLAN OF CARE
Date/Time 5/16 devendra    Trauma/Ortho/Medical (Choose one) ortho    Diagnosis:I&D right stump with wound vac  POD#:DOS  Mental Status:a&o  Activity/dangle turns q1-2 hours  Diet:gluten free  Pain:tylenol only per patient request  Alvarez/Voiding:bedpan or incontinent  Tele/Restraints/Iso:no  02/LDA:O2 1l- 89 on room air. Saline lock LFA- fragile veins  D/C Date:?  Other Info:wound to coccyx covered with mepilex. Bruising all over.

## 2024-05-17 NOTE — PLAN OF CARE
Occupational Therapy: Smoking cessation orders received. Chart reviewed and discussed with care team.?SC education not indicated at this time. Pt politely declining further education on smoking cessation as pt reports she is set on quitting. Pt verbalizes understanding with use of patches and has been using them, reports they have been helpful. Defer discharge recommendations to Care Team.? Will complete orders.

## 2024-05-17 NOTE — PROGRESS NOTES
Will plan on dialysis on Saturday.    Then we have until Tuesday to see if sufficient renal recovery.    Jacobo Torre MD
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
## 2024-05-17 NOTE — PLAN OF CARE
Goal Outcome Evaluation:      Date/Time: 5/16/2024 11p-7:30a     Diagnosis: I and D of R stump with wound vac placement  Mental Status: A/O x4  Activity/dangle: 1 pivot BSC  Diet: gluten free  Pain: scheduled Tylenol given  Alvarez/Voiding: Dialysis pt/ occ. incontinent  Tele/Restraints/Iso: NA  02/LDA: 1 lpm NC  D/C Date: TBD

## 2024-05-17 NOTE — PROVIDER NOTIFICATION
MD Notification    Notified Person: MD    Notified Person Name: Dr. Alfaro    Notification Date/Time: 05/17/2024 @ 0902    Notification Interaction: voccharlene    Purpose of Notification: HR 58-59, no parameter for carvedilol, wondering whether to hold or administer.     Orders Received: hold the med per order.    Comments:  Paged hospitalist for WOC consult for coccyx/ sacrum wound- MD acknowledged

## 2024-05-17 NOTE — PROGRESS NOTES
Vascular Surgery Progress Note  05/17/2024       Subjective:  Resting comfortably in bed this am, no acute complaints.     Objective:  Temp:  [97.9  F (36.6  C)-98.3  F (36.8  C)] 98.3  F (36.8  C)  Pulse:  [59-71] 59  Resp:  [12-26] 16  BP: (128-191)/() 153/67  SpO2:  [89 %-100 %] 95 %    I/O last 3 completed shifts:  In: 150 [I.V.:150]  Out: 1000 [Other:1000]      Gen: Awake, alert, NAD  Resp: NLB on NC  Incision: c/d/I, wound vac holding seal nicely.  Ext: WWP, no edema  Vascular: palpable R femoral pulse.      Labs:  Recent Labs   Lab 05/16/24  0832 05/14/24  0805   WBC 3.4* 4.0   HGB 10.0* 10.2*    184       Recent Labs   Lab 05/16/24  1326 05/16/24  1251 05/16/24  1152 05/16/24  0832 05/15/24  0542 05/14/24  0805 05/13/24  0706 05/12/24  1309   NA  --   --   --  133* 131* 128* 129*  --    POTASSIUM  --   --   --  4.7 4.4 4.4 4.3  --    CHLORIDE  --   --   --  101 99 96* 96*  --    CO2  --   --   --  23 24 25 25  --    BUN  --   --   --  24.5* 15.5 22.5 14.2  --    CR  --   --   --  2.76* 2.07* 3.18* 2.64*  --    * 58* 74 66* 75 90 68*  --    SONIA  --   --   --  7.5* 7.4* 7.5* 7.4*  --    MAG  --   --   --   --   --  1.8 1.9 2.1   PHOS  --   --   --   --  2.7 3.7 2.6  --        Imaging:  No new imaging reviewed     Assessment/Plan:   63 year old female with extensive past vascular surgical history who is now s/p RLE AKA after acute thrombosis of CFA-AT bypass with ALI in early April of this year. Unfortunately AKA had areas of necrosis on anterior aspect, now s/p I&D, wound vac placement on 5/16.     - continue wound vac, plan for vac change Monday in OR  - NGTD on cultures, no gram stain organisms, no abx needed for now, will follow cultures.   - Please continue plavix, no need to hold for vac change mon  - continue prn pain meds  - Given well vascularized appearance of RLE tissue and palpable RLE femoral pulse we will hold off on further imaging or plans for intervention on R profunda, will  monitor clinical progress of wound.      Discussed with vascular surgery staff, who is in agreement with the above.  - - - - - - - - - - - - - - - - - -  Bj Lo, PGY-3  Vascular Surgery Resident    VASCULAR SURGICAL STAFF:  As noted above.  Sitting up eating lunch at the time of my examination.  No complaints.  Right thigh wound VAC working well.  No organisms seen on Gram stain.  Cultures no growth thus far.  Continue present cares.  Dr. Hernandez plans repeat washout and changing of right thigh wound VAC sometime on Monday.  She has a high grade right profunda stenosis which is the outflow for the right limb of her ABFG.  May need to consider a CO2 angiogram with PTA of the profunda.  Avoiding contrast with her tenuous renal fx.    Donovan Enciso MD

## 2024-05-17 NOTE — PLAN OF CARE
Goal Outcome Evaluation:       Date/Time: 05/17/2024 ([id number])    Trauma/Ortho/Medical (Choose one):  Trauma    Diagnosis: I and D right AKA  POD#:1  Mental Status: A&O x4  Activity/dangle: One assist- sliding board from bed to chair/ BSC  Diet: Gluten Free  Pain: Tylenol  Alvarez/Voiding: Dialysis, incontinent @ times, few loose stool this shift.  Tele/Restraints/Iso: NA  02/LDA: IV-SL, Wound vac  D/C Date: Pending  Other Info: Surgery on Monday, dialysis tomorrow.

## 2024-05-18 LAB
ALBUMIN SERPL BCG-MCNC: 1.8 G/DL (ref 3.5–5.2)
ANION GAP SERPL CALCULATED.3IONS-SCNC: 11 MMOL/L (ref 7–15)
BUN SERPL-MCNC: 26.3 MG/DL (ref 8–23)
CALCIUM SERPL-MCNC: 7.3 MG/DL (ref 8.8–10.2)
CHLORIDE SERPL-SCNC: 98 MMOL/L (ref 98–107)
CREAT SERPL-MCNC: 2.65 MG/DL (ref 0.51–0.95)
DEPRECATED HCO3 PLAS-SCNC: 20 MMOL/L (ref 22–29)
EGFRCR SERPLBLD CKD-EPI 2021: 19 ML/MIN/1.73M2
GLUCOSE SERPL-MCNC: 73 MG/DL (ref 70–99)
GLUCOSE SERPL-MCNC: 73 MG/DL (ref 70–99)
PHOSPHATE SERPL-MCNC: 4.3 MG/DL (ref 2.5–4.5)
POTASSIUM SERPL-SCNC: 5.2 MMOL/L (ref 3.4–5.3)
SODIUM SERPL-SCNC: 129 MMOL/L (ref 135–145)

## 2024-05-18 PROCEDURE — 250N000013 HC RX MED GY IP 250 OP 250 PS 637: Performed by: HOSPITALIST

## 2024-05-18 PROCEDURE — 250N000013 HC RX MED GY IP 250 OP 250 PS 637

## 2024-05-18 PROCEDURE — 250N000013 HC RX MED GY IP 250 OP 250 PS 637: Performed by: INTERNAL MEDICINE

## 2024-05-18 PROCEDURE — 258N000003 HC RX IP 258 OP 636: Performed by: INTERNAL MEDICINE

## 2024-05-18 PROCEDURE — 90937 HEMODIALYSIS REPEATED EVAL: CPT

## 2024-05-18 PROCEDURE — 80069 RENAL FUNCTION PANEL: CPT | Performed by: STUDENT IN AN ORGANIZED HEALTH CARE EDUCATION/TRAINING PROGRAM

## 2024-05-18 PROCEDURE — 120N000001 HC R&B MED SURG/OB

## 2024-05-18 PROCEDURE — 99232 SBSQ HOSP IP/OBS MODERATE 35: CPT | Performed by: STUDENT IN AN ORGANIZED HEALTH CARE EDUCATION/TRAINING PROGRAM

## 2024-05-18 PROCEDURE — 250N000011 HC RX IP 250 OP 636: Performed by: INTERNAL MEDICINE

## 2024-05-18 PROCEDURE — 90935 HEMODIALYSIS ONE EVALUATION: CPT | Performed by: INTERNAL MEDICINE

## 2024-05-18 PROCEDURE — 36415 COLL VENOUS BLD VENIPUNCTURE: CPT | Performed by: STUDENT IN AN ORGANIZED HEALTH CARE EDUCATION/TRAINING PROGRAM

## 2024-05-18 RX ORDER — HEPARIN SODIUM 1000 [USP'U]/ML
500 INJECTION, SOLUTION INTRAVENOUS; SUBCUTANEOUS CONTINUOUS
Status: DISCONTINUED | OUTPATIENT
Start: 2024-05-18 | End: 2024-05-20

## 2024-05-18 RX ADMIN — FLUTICASONE FUROATE AND VILANTEROL TRIFENATATE 1 PUFF: 200; 25 POWDER RESPIRATORY (INHALATION) at 11:56

## 2024-05-18 RX ADMIN — HEPARIN SODIUM 1600 UNITS: 1000 INJECTION INTRAVENOUS; SUBCUTANEOUS at 10:41

## 2024-05-18 RX ADMIN — CLOPIDOGREL BISULFATE 75 MG: 75 TABLET ORAL at 11:40

## 2024-05-18 RX ADMIN — Medication 1 CAPSULE: at 22:32

## 2024-05-18 RX ADMIN — CETIRIZINE HYDROCHLORIDE 5 MG: 5 TABLET ORAL at 11:40

## 2024-05-18 RX ADMIN — ROSUVASTATIN CALCIUM 10 MG: 10 TABLET, FILM COATED ORAL at 22:31

## 2024-05-18 RX ADMIN — CARVEDILOL 6.25 MG: 3.12 TABLET, FILM COATED ORAL at 10:52

## 2024-05-18 RX ADMIN — SODIUM CHLORIDE 250 ML: 9 INJECTION, SOLUTION INTRAVENOUS at 10:40

## 2024-05-18 RX ADMIN — Medication 1 CAPSULE: at 11:40

## 2024-05-18 RX ADMIN — HEPARIN SODIUM 500 UNITS: 1000 INJECTION INTRAVENOUS; SUBCUTANEOUS at 10:39

## 2024-05-18 RX ADMIN — NICOTINE 1 PATCH: 14 PATCH, EXTENDED RELEASE TRANSDERMAL at 11:41

## 2024-05-18 RX ADMIN — HEPARIN SODIUM 500 UNITS/HR: 1000 INJECTION INTRAVENOUS; SUBCUTANEOUS at 10:39

## 2024-05-18 RX ADMIN — MICONAZOLE NITRATE: 2 POWDER TOPICAL at 22:30

## 2024-05-18 RX ADMIN — AMLODIPINE BESYLATE 10 MG: 10 TABLET ORAL at 10:52

## 2024-05-18 RX ADMIN — ACETAMINOPHEN 975 MG: 325 TABLET, FILM COATED ORAL at 22:31

## 2024-05-18 RX ADMIN — SODIUM CHLORIDE 300 ML: 9 INJECTION, SOLUTION INTRAVENOUS at 10:40

## 2024-05-18 RX ADMIN — ACETAMINOPHEN 975 MG: 325 TABLET, FILM COATED ORAL at 14:03

## 2024-05-18 RX ADMIN — CARVEDILOL 6.25 MG: 3.12 TABLET, FILM COATED ORAL at 18:11

## 2024-05-18 RX ADMIN — GABAPENTIN 200 MG: 100 CAPSULE ORAL at 22:35

## 2024-05-18 RX ADMIN — HYDRALAZINE HYDROCHLORIDE 10 MG: 10 TABLET ORAL at 11:40

## 2024-05-18 RX ADMIN — OXYCODONE HYDROCHLORIDE 5 MG: 5 TABLET ORAL at 14:03

## 2024-05-18 RX ADMIN — POLYETHYLENE GLYCOL 3350 17 G: 17 POWDER, FOR SOLUTION ORAL at 11:44

## 2024-05-18 RX ADMIN — OXYCODONE HYDROCHLORIDE 5 MG: 5 TABLET ORAL at 22:32

## 2024-05-18 RX ADMIN — FAMOTIDINE 20 MG: 20 TABLET, FILM COATED ORAL at 18:11

## 2024-05-18 RX ADMIN — SENNOSIDES AND DOCUSATE SODIUM 1 TABLET: 50; 8.6 TABLET ORAL at 22:31

## 2024-05-18 RX ADMIN — MICONAZOLE NITRATE: 2 POWDER TOPICAL at 11:42

## 2024-05-18 RX ADMIN — ACETAMINOPHEN 975 MG: 325 TABLET, FILM COATED ORAL at 06:30

## 2024-05-18 ASSESSMENT — ACTIVITIES OF DAILY LIVING (ADL)
ADLS_ACUITY_SCORE: 56
ADLS_ACUITY_SCORE: 52
ADLS_ACUITY_SCORE: 53
ADLS_ACUITY_SCORE: 55
ADLS_ACUITY_SCORE: 55
ADLS_ACUITY_SCORE: 53
ADLS_ACUITY_SCORE: 53
ADLS_ACUITY_SCORE: 55
ADLS_ACUITY_SCORE: 56
ADLS_ACUITY_SCORE: 53
ADLS_ACUITY_SCORE: 55
ADLS_ACUITY_SCORE: 53
ADLS_ACUITY_SCORE: 53
ADLS_ACUITY_SCORE: 56
ADLS_ACUITY_SCORE: 53
ADLS_ACUITY_SCORE: 53
ADLS_ACUITY_SCORE: 52
ADLS_ACUITY_SCORE: 52
ADLS_ACUITY_SCORE: 55
ADLS_ACUITY_SCORE: 52

## 2024-05-18 NOTE — PLAN OF CARE
Trauma/Ortho/Medical (Choose one):  Trauma     Diagnosis: I and D right AKA  POD#:2  Mental Status: A&O x4  Activity/dangle: One assist- sliding board from bed to chair/ BSC  Diet: Gluten Free  Pain: Tylenol  Alvarez/Voiding: Dialysis  Tele/Restraints/Iso: NA  02/LDA: IV-SL, Wound vac  D/C Date: Pending  Other Info: Surgery on Monday

## 2024-05-18 NOTE — PROGRESS NOTES
Nephrology Progress Note  05/18/2024         Assessment & Recommendations:   Shirley Hendricks is a 63 year old female who was admitted on 5/15/2024.      1) Dialysis dependent LIMA: Reports making more urine.  Urine output measurement is inaccurate.  Predialysis BUN is quite low.  -Monitor for renal recovery.  Plan for dialysis today.  Next on Tuesday.  -Accurate I's/O is much as possible.  Daily renal panel.       2) RLE AKA wound dehiscence and necrosis: Status post I&D     3)Anemia: HGB up to 10.2 .  Mircera as outpatient     4) HTN: Continue amlodipine, carvedilol, Bumex on nondialysis days.  -Monitor blood pressure postdialysis.      Nereida Vaz MD  [facility] - Nephrology   [phone]      Interval History :   Seen / examined on dialysis  1 L UF   Rt internal jugular TDC   Report of dialysis going okay.  Denies any complaints.  Reports he is making urine frequently.  Has a diaper in place.  Urine output measurement is inaccurate.  Predialysis creatinine of 2.6 and a BUN of 26.  Sodium is 129.        Physical Exam:   I/O last 3 completed shifts:  In: 600 [P.O.:600]  Out: -   BP (!) 200/84 (BP Location: Left arm, Cuff Size: Adult Regular)   Pulse 62   Temp 98.1  F (36.7  C) (Oral)   Resp (!) 5   Wt 56.7 kg (125 lb)   LMP  (LMP Unknown)   SpO2 93%   BMI 23.63 kg/m      GENERAL APPEARANCE: alert and no distress  EYES:  no scleral icterus, pupils equal  PULM: lungs clear to auscultation, equal air movement, no cyanosis or clubbing  CV: regular rhythm, normal rate, no rub     -JVP -     -edema -.Rt BKA   GI: soft, non tender,   NEURO: mentation intact and speech normal, no asterixis   Access Rt internal jugular TDC     Labs:   All labs reviewed by me  Electrolytes/Renal -   Recent Labs   Lab Test 05/18/24  0725 05/17/24  1137 05/16/24  1326 05/16/24  1152 05/16/24  0832 05/15/24  0542 05/14/24  0805 05/13/24  0706 05/12/24  1309   * 133*  --   --  133* 131* 128* 129*  --    POTASSIUM  5.2 5.1  --   --  4.7 4.4 4.4 4.3  --    CHLORIDE 98 97*  --   --  101 99 96* 96*  --    CO2 20* 26  --   --  23 24 25 25  --    BUN 26.3* 18.5  --   --  24.5* 15.5 22.5 14.2  --    CR 2.65* 2.33*  --   --  2.76* 2.07* 3.18* 2.64*  --    GLC 73  73 90  90 107*   < > 66* 75 90 68*  --    SONIA 7.3* 7.8*  --   --  7.5* 7.4* 7.5* 7.4*  --    MAG  --   --   --   --   --   --  1.8 1.9 2.1   PHOS 4.3 3.9  --   --   --  2.7 3.7 2.6  --     < > = values in this interval not displayed.       CBC -   Recent Labs   Lab Test 05/17/24  1137 05/16/24  0832 05/14/24  0805   WBC 5.8 3.4* 4.0   HGB 10.2* 10.0* 10.2*    173 184       LFTs -   Recent Labs   Lab Test 05/18/24  0725 05/17/24  1137 05/16/24  0832 05/15/24  0542 05/14/24  0805 05/08/24  0546 05/07/24  0553   ALKPHOS  --   --  74  --  82  --  90   BILITOTAL  --   --  0.2  --  0.3  --  0.5   ALT  --   --  9  --  7  --  12   AST  --   --  13  --  16  --  14   PROTTOTAL  --   --  4.2*  --  4.3*  --  4.1*   ALBUMIN 1.8* 2.1* 1.9*   < > 2.0*   < > 1.9*    < > = values in this interval not displayed.       Iron Panel -   Recent Labs   Lab Test 04/10/24  0559 04/09/24  1817 01/08/24  1542 12/11/23  0550   IRON 22*  --  71 122   IRONSAT 18  --  17 32   BRYN  --  609* 25 23             Current Medications:  Current Facility-Administered Medications   Medication Dose Route Frequency Provider Last Rate Last Admin    - MEDICATION INSTRUCTIONS for Dialysis Patients -   Does not apply See Admin Instructions Yanira Mario, [facility]        acetaminophen (TYLENOL) tablet 975 mg  975 mg Oral Q8H Levy Byers MD   975 mg at 05/18/24 0630    amLODIPine (NORVASC) tablet 10 mg  10 mg Oral Daily Manuel Robledo MD   10 mg at 05/17/24 0816    bumetanide (BUMEX) tablet 4 mg  4 mg Oral Once per day on Sunday Monday Wednesday Friday Manuel Robledo MD   4 mg at 05/17/24 1024    carvedilol (COREG) tablet 6.25 mg  6.25 mg Oral BID w/meals Clarisse Alfaro MD   6.25 mg at  05/17/24 1736    cetirizine (zyrTEC) tablet 5 mg  5 mg Oral Daily Manuel Robledo MD   5 mg at 05/17/24 0816    clopidogrel (PLAVIX) tablet 75 mg  75 mg Oral Daily Gala Lo MD   75 mg at 05/17/24 1019    famotidine (PEPCID) tablet 20 mg  20 mg Oral Q48H Gala Lo MD   20 mg at 05/16/24 1921    Or    famotidine (PEPCID) injection 20 mg  20 mg Intravenous Q48H Gala Lo MD        fluticasone-vilanterol (BREO ELLIPTA) 200-25 MCG/ACT inhaler 1 puff  1 puff Inhalation Daily Manuel Robledo MD   1 puff at 05/17/24 1020    gabapentin (NEURONTIN) capsule 200 mg  200 mg Oral Once per day on Tuesday Thursday Saturday Manuel Robledo MD   200 mg at 05/16/24 2201    heparin (porcine) injection  500 Units Hemodialysis Machine OR IV Push Once in dialysis/CRRT Jacobo Torre MD        sodium chloride 0.9% DIALYSIS Cath LOCK - RED Lumen  10 mL Intracatheter Once in dialysis/CRRT Jacobo Torre MD        Followed by    heparin 1000 unit/mL DIALYSIS Cath LOCK - RED Lumen  1.3-2.6 mL Intracatheter Once in dialysis/CRRT Jacobo Torre MD        sodium chloride 0.9% DIALYSIS Cath LOCK - BLUE Lumen  10 mL Intracatheter Once in dialysis/CRRT Jacobo Torre MD        Followed by    heparin 1000 unit/mL DIALYSIS Cath LOCK -BLUE Lumen  1.3-2.6 mL Intracatheter Once in dialysis/CRRT Jacobo Torre MD        lactobacillus rhamnosus (GG) (CULTURELL) capsule 1 capsule  1 capsule Oral BID Manuel Robledo MD   1 capsule at 05/17/24 2144    miconazole (MICATIN) 2 % powder   Topical BID Manuel Robledo MD   Given at 05/17/24 2145    multivitamin RENAL (TRIPHROCAPS) capsule 1 capsule  1 capsule Oral Daily Manuel Robledo MD   1 capsule at 05/17/24 1336    nicotine (NICODERM CQ) 14 MG/24HR 24 hr patch 1 patch  1 patch Transdermal Daily Manuel Robledo MD   1 patch at 05/17/24 0816    polyethylene glycol (MIRALAX) Packet 17 g  17 g Oral Daily Gala Lo MD        rosuvastatin  (CRESTOR) tablet 10 mg  10 mg Oral At Bedtime Manuel Robledo MD   10 mg at 05/17/24 2144    [Held by provider] senna-docusate (SENOKOT-S/PERICOLACE) 8.6-50 MG per tablet 1-2 tablet  1-2 tablet Oral BID Manuel Robledo MD        silver sulfADIAZINE (SILVADENE) 1 % cream   Topical Daily Manuel Robledo MD        sodium chloride (PF) 0.9% PF flush 3 mL  3 mL Intracatheter Q8H Gala Lo MD   3 mL at 05/17/24 1738    sodium chloride (PF) 0.9% PF flush 9 mL  9 mL Intracatheter During Dialysis/CRRT (from stock) Jacobo Torre MD        sodium chloride (PF) 0.9% PF flush 9 mL  9 mL Intracatheter During Dialysis/CRRT (from stock) Jacobo Torre MD        sodium chloride 0.9% BOLUS 250 mL  250 mL Intravenous Once in dialysis/CRRT Jacobo Trore MD        sodium chloride 0.9% BOLUS 300 mL  300 mL Hemodialysis Machine Once Jacobo Torre MD         Current Facility-Administered Medications   Medication Dose Route Frequency Provider Last Rate Last Admin    heparin 10,000 units/10 mL infusion (DIALYSIS USE)  500 Units/hr Hemodialysis Machine Continuous Jacobo Torre MD Jiwan Thapa, MD

## 2024-05-18 NOTE — PLAN OF CARE
Goal Outcome Evaluation:    Shift: 5/18/24, [id number]  Summary: Right AKA  A&OX4  VSS@RA, Slight HTN, PRN Hydralazine given and effective.    PRN oxy and scheduled tylenol given per orders fo T foot phantom pain.  Dialysis T,H and Sat. Tolerated well. Pt able to call appropriately   R HARRIETT has a wound VAC and plans to clean torr rrow .

## 2024-05-18 NOTE — PROGRESS NOTES
Potassium   Date Value Ref Range Status   05/18/2024 5.2 3.4 - 5.3 mmol/L Final   12/29/2022 4.3 3.4 - 5.3 mmol/L Final   07/09/2021 5.2 3.4 - 5.3 mmol/L Final     Hemoglobin   Date Value Ref Range Status   05/17/2024 10.2 (L) 11.7 - 15.7 g/dL Final   07/09/2021 8.8 (L) 11.7 - 15.7 g/dL Final     Creatinine   Date Value Ref Range Status   05/18/2024 2.65 (H) 0.51 - 0.95 mg/dL Final   07/09/2021 0.77 0.52 - 1.04 mg/dL Final     Urea Nitrogen   Date Value Ref Range Status   05/18/2024 26.3 (H) 8.0 - 23.0 mg/dL Final   12/29/2022 17 7 - 30 mg/dL Final   07/09/2021 13 7 - 30 mg/dL Final     Sodium   Date Value Ref Range Status   05/18/2024 129 (L) 135 - 145 mmol/L Final     Comment:     Reference intervals for this test were updated on 09/26/2023 to more accurately reflect our healthy population. There may be differences in the flagging of prior results with similar values performed with this method. Interpretation of those prior results can be made in the context of the updated reference intervals.    07/09/2021 132 (L) 133 - 144 mmol/L Final     INR   Date Value Ref Range Status   04/11/2024 1.31 (H) 0.85 - 1.15 Final   09/24/2020 1.01 0.86 - 1.14 Final       DIALYSIS PROCEDURE NOTE  Hepatitis status of previous patient on machine log was checked and verified ok to use with this patients hepatitis status.  Patient dialyzed for 3 hrs. on a K2 bath with a net fluid removal of  1L.  A BFR of 400 ml/min was obtained via a right CVC.  The treatment plan was discussed with Dr. Santos during the treatment.    Total heparin received during the treatment: 2000 units.     Line flushed, clamped and capped with heparin 1:1000 2 mL (2000 units) per lumen    Meds  given: BP meds per primary RN   Complications: hypertension. Primary RN gave meds during tx and made aware post tx.       Person educated: pt. Knowledge base substantial. Barriers to learning: none. Educated on procedure via verbal mode. Patient verbalized understanding.    ICEBOAT? Timeout performed pre-treatment  I: Patient was identified using 2 identifiers  C:  Consent Signed Yes  E: Equipment preventative maintenance is current and dialysis delivery system OK to use  B: Hepatitis B Surface Antigen: Negative; Draw Date: 5/7/2024      Hepatitis B Surface Antibody: Susceptible; Draw Date: 5/7/2024  O: Dialysis orders present and complete prior to treatment  A: Vascular access verified and assessed prior to treatment  T: Treatment was performed at a clinically appropriate time  ?: Patient was allowed to ask questions and address concerns prior to treatment  See Adult Hemodialysis flowsheet in [facility] for further details and post assessment.  Machine water alarm in place and functioning. Transducer pods intact and checked every 15min.   Pt assisted with repositioning throughout dialysis treatment.  Pt returned via bed.  Chlorine/Chloramine water system checked every 4 hours.      Post treatment report given to WALLACE Jacobsen RN regarding 1L of fluid removed, BP.      Aram Herrera RN

## 2024-05-18 NOTE — PROGRESS NOTES
[facility]    Medicine Progress Note - Hospitalist Service    Date of Admission:  5/15/2024    Assessment & Plan   Shirley Hendricks is a 63 year old female with complex medical history which includes anxiety, Charcot- Breonna Tooth disease, GERD, hypertension, hyperlipidemia, lupus, severe peripheral artery disease, GERD, supraventricular tachycardia, asthma, who was recently admitted in the hospital from 3/29/2024 to 4/22/2024 with right ischemic limb, severe peripheral artery disease, ultimately underwent above-knee amputation on the right side, course complicated by acute renal failure, rhabdomyolysis, sepsis, acute blood loss anemia, delirium, diarrhea and retroperitoneal hematoma. She was eventually discharged to acute rehab, now presenting because of wound dehiscence and necrosis that needs debridement. Underwent debridement of the stump on 5/16, cultures thus far negative    R AKA c/b wound dehiscence  No evidence of a bacterial SSTI of the wound at this time. Wound was apparently necrotic but not overtly infected. Surgical cultures are negative thus far and patient doing well, continues to remain off antibiotics. Wound vac is in place, patient notes pain deloris when the stump is being evaluated.   Planned for repeat washout on 5/20.  Resumed plavix and crestor. Holding xarelto.    Acute renal failure  Occurred during recent hospital course likely 2/2 rhabdomyolysis and ischemic injury. Has been dialysis depdent on Tuesday Thursday, Saturday. However, has been having increased urination and possible that she is having at least some renal recovry.  -renal is following, appreciate recs.    Anemia of chronic kidney disease  Patient has history of acute blood loss anemia, with retroperitoneal hematoma and surgeries.  Hemoglobin stable around 10 at this time.  No active bleeding at this time     Hypertension  Hyperlipidemia  History of coronary artery disease  No active chest pain at this  time, blood pressure slightly on the higher side.  She is on amlodipine 10 mg daily, Coreg 6.25 mg twice daily, as needed hydralazine  and Bumex 4 mg 3-4 times a week.  All meds continued.        Possible celiac disease  History of loose stools  Patient is noncompliant with gluten-free diet.  Mild abdominal tenderness on exam.  At some point need to follow-up with the GI and counseling.     Asthma  GERD  No acute exacerbation of asthma, continue PTA Breo Ellipta   Continue PTA Pepcid 20 mg daily.     History of diabetes mellitus type 2  Last hemoglobin A1c was 5.2  Her Jardiance was discontinued.  She has had low blood sugars going down to 50s/60s range.  Placed on hypoglycemia protocol.  Currently on diet control     History of B-cell lymphoma  Followed by [place] [facility]  Continue outpatient surveillance and follow-up with oncology.     Hyponatremia  This is likely secondary to renal failure.  Nephrology to follow,  ultrafiltrate with dialysis.             Diet: Gluten Free Diet    DVT Prophylaxis: Pneumatic Compression Devices  Alvarez Catheter: Not present  Lines: PRESENT      CVC Double Lumen Right Internal jugular Tunneled-Site Assessment: WDL      Cardiac Monitoring: None  Code Status: Full Code      Clinically Significant Risk Factors         # Hyponatremia: Lowest Na = 129 mmol/L in last 2 days, will monitor as appropriate      # Hypoalbuminemia: Lowest albumin = 1.8 g/dL at 5/18/2024  7:25 AM, will monitor as appropriate     # Hypertension: Noted on problem list            # Financial/Environmental Concerns:           Disposition Plan     Medically Ready for Discharge: Anticipated in 2-4 Days             Nayan Funes MD  Hospitalist Service  [facility]  Securely message with [employer] (more info)  Text page via [employer] Paging/Directory   ______________________________________________________________________    Interval History   NAEON  Vascular following s/o debridement of right  stump.  Notes pain in stump especially during dressing changes/manipulation and at night.   Otherwise no complaints. Denies fever, cp, sob, abd pain, diarrhea. Is somewhat constipated. She declined bowel regimen, using metamucil.     Physical Exam   Vital Signs: Temp: 98.7  F (37.1  C) Temp src: Oral BP: (!) 152/68 Pulse: 66   Resp: 16 SpO2: 95 % O2 Device: None (Room air)    Weight: 125 lbs .01 oz    General Appearance: Appears well, sitting up eating lunch.  Respiratory: no distress, CTAB  Cardiovascular: RRR no murmurs  GI: soft, nontender, nondistended  Skin: no rashes, no edema  Other: LLE stump with dressing in place. Drain has minimal contents     Medical Decision Making       30 MINUTES SPENT BY ME on the date of service doing chart review, history, exam, documentation & further activities per the note.      Data     I have personally reviewed the following data over the past 24 hrs:    N/A  \   N/A   / N/A     129 (L) 98 26.3 (H) /  73; 73   5.2 20 (L) 2.65 (H) \     ALT: N/A AST: N/A AP: N/A TBILI: N/A   ALB: 1.8 (L) TOT PROTEIN: N/A LIPASE: N/A       Imaging results reviewed over the past 24 hrs:   No results found for this or any previous visit (from the past 24 hour(s)).

## 2024-05-18 NOTE — PROGRESS NOTES
Vascular Surgery Progress Note  05/18/2024       Subjective:  -Resting comfortably in bed, although does report pain in RLE stump.  Reports attempted to stand up on walker with wheels yesterday which she was not pleased with felt she was very unstable did not feel safe.  Denies fever chills       Objective:  Temp:  [97.9  F (36.6  C)-98.7  F (37.1  C)] 98.7  F (37.1  C)  Pulse:  [56-75] 66  Resp:  [0-185] 16  BP: (142-200)/() 152/68  SpO2:  [92 %-96 %] 95 %    I/O last 3 completed shifts:  In: 240 [P.O.:240]  Out: 1000 [Other:1000]      Gen: Awake, alert, NAD  Resp: NLB on RA  Incision: c/d/I, WV holding seal appropriately  Ext: WWP, no edema  Vascular: Palpable RLE femoral pulse.     Labs:  Recent Labs   Lab 05/17/24  1137 05/16/24  0832 05/14/24  0805   WBC 5.8 3.4* 4.0   HGB 10.2* 10.0* 10.2*    173 184       Recent Labs   Lab 05/18/24  0725 05/17/24  1137 05/16/24  1326 05/16/24  1152 05/16/24  0832 05/15/24  0542 05/14/24  0805 05/13/24  0706 05/12/24  1309   * 133*  --   --  133* 131* 128* 129*  --    POTASSIUM 5.2 5.1  --   --  4.7 4.4 4.4 4.3  --    CHLORIDE 98 97*  --   --  101 99 96* 96*  --    CO2 20* 26  --   --  23 24 25 25  --    BUN 26.3* 18.5  --   --  24.5* 15.5 22.5 14.2  --    CR 2.65* 2.33*  --   --  2.76* 2.07* 3.18* 2.64*  --    GLC 73  73 90  90 107*   < > 66* 75 90 68*  --    SONIA 7.3* 7.8*  --   --  7.5* 7.4* 7.5* 7.4*  --    MAG  --   --   --   --   --   --  1.8 1.9 2.1   PHOS 4.3 3.9  --   --   --  2.7 3.7 2.6  --     < > = values in this interval not displayed.       Imaging:  No new imaging reviewed     Assessment/Plan:   63 year old female with extensive past vascular surgical history who is now s/p RLE AKA after acute thrombosis of CFA-AT bypass with ALI in early April of this year. Unfortunately AKA had areas of necrosis on anterior aspect, now s/p I&D, wound vac placement on 5/16.      - continue wound vac, plan for vac change Monday in OR  - NGTD on cultures, no  gram stain organisms, no abx needed for now, will follow cultures.   - Please continue plavix, no need to hold for vac change mon  - continue prn pain meds  - Given well vascularized appearance of RLE tissue and palpable RLE femoral pulse we will hold off on further imaging or plans for intervention on R profunda, will monitor clinical progress of wound.      Discussed with vascular surgery staff, who is in agreement with the above.  - - - - - - - - - - - - - - - - - -  jB Lo, PGY-3  Vascular Surgery Resident

## 2024-05-19 LAB
ALBUMIN SERPL BCG-MCNC: 1.9 G/DL (ref 3.5–5.2)
ANION GAP SERPL CALCULATED.3IONS-SCNC: 6 MMOL/L (ref 7–15)
BUN SERPL-MCNC: 17.2 MG/DL (ref 8–23)
CALCIUM SERPL-MCNC: 7.5 MG/DL (ref 8.8–10.2)
CHLORIDE SERPL-SCNC: 99 MMOL/L (ref 98–107)
CREAT SERPL-MCNC: 1.95 MG/DL (ref 0.51–0.95)
DEPRECATED HCO3 PLAS-SCNC: 27 MMOL/L (ref 22–29)
EGFRCR SERPLBLD CKD-EPI 2021: 28 ML/MIN/1.73M2
GLUCOSE SERPL-MCNC: 71 MG/DL (ref 70–99)
PHOSPHATE SERPL-MCNC: 3.6 MG/DL (ref 2.5–4.5)
POTASSIUM SERPL-SCNC: 4.2 MMOL/L (ref 3.4–5.3)
SODIUM SERPL-SCNC: 132 MMOL/L (ref 135–145)

## 2024-05-19 PROCEDURE — 250N000011 HC RX IP 250 OP 636

## 2024-05-19 PROCEDURE — 250N000013 HC RX MED GY IP 250 OP 250 PS 637: Performed by: INTERNAL MEDICINE

## 2024-05-19 PROCEDURE — 250N000013 HC RX MED GY IP 250 OP 250 PS 637

## 2024-05-19 PROCEDURE — 250N000013 HC RX MED GY IP 250 OP 250 PS 637: Performed by: HOSPITALIST

## 2024-05-19 PROCEDURE — 120N000001 HC R&B MED SURG/OB

## 2024-05-19 PROCEDURE — 99232 SBSQ HOSP IP/OBS MODERATE 35: CPT | Performed by: STUDENT IN AN ORGANIZED HEALTH CARE EDUCATION/TRAINING PROGRAM

## 2024-05-19 PROCEDURE — 80069 RENAL FUNCTION PANEL: CPT | Performed by: STUDENT IN AN ORGANIZED HEALTH CARE EDUCATION/TRAINING PROGRAM

## 2024-05-19 PROCEDURE — 36415 COLL VENOUS BLD VENIPUNCTURE: CPT | Performed by: STUDENT IN AN ORGANIZED HEALTH CARE EDUCATION/TRAINING PROGRAM

## 2024-05-19 RX ADMIN — MICONAZOLE NITRATE: 2 POWDER TOPICAL at 08:49

## 2024-05-19 RX ADMIN — BUMETANIDE 4 MG: 1 TABLET ORAL at 10:48

## 2024-05-19 RX ADMIN — POLYETHYLENE GLYCOL 3350 17 G: 17 POWDER, FOR SOLUTION ORAL at 10:48

## 2024-05-19 RX ADMIN — FLUTICASONE FUROATE AND VILANTEROL TRIFENATATE 1 PUFF: 200; 25 POWDER RESPIRATORY (INHALATION) at 08:48

## 2024-05-19 RX ADMIN — CARVEDILOL 6.25 MG: 3.12 TABLET, FILM COATED ORAL at 08:48

## 2024-05-19 RX ADMIN — ACETAMINOPHEN 975 MG: 325 TABLET, FILM COATED ORAL at 14:07

## 2024-05-19 RX ADMIN — CARVEDILOL 6.25 MG: 3.12 TABLET, FILM COATED ORAL at 17:25

## 2024-05-19 RX ADMIN — CLOPIDOGREL BISULFATE 75 MG: 75 TABLET ORAL at 08:48

## 2024-05-19 RX ADMIN — Medication 1 CAPSULE: at 14:07

## 2024-05-19 RX ADMIN — Medication 1 CAPSULE: at 08:48

## 2024-05-19 RX ADMIN — ONDANSETRON 4 MG: 4 TABLET, ORALLY DISINTEGRATING ORAL at 11:35

## 2024-05-19 RX ADMIN — OXYCODONE HYDROCHLORIDE 5 MG: 5 TABLET ORAL at 10:51

## 2024-05-19 RX ADMIN — ACETAMINOPHEN 975 MG: 325 TABLET, FILM COATED ORAL at 06:33

## 2024-05-19 RX ADMIN — AMLODIPINE BESYLATE 10 MG: 10 TABLET ORAL at 08:48

## 2024-05-19 RX ADMIN — Medication 1 CAPSULE: at 21:25

## 2024-05-19 RX ADMIN — NICOTINE 1 PATCH: 14 PATCH, EXTENDED RELEASE TRANSDERMAL at 08:51

## 2024-05-19 RX ADMIN — OXYCODONE HYDROCHLORIDE 5 MG: 5 TABLET ORAL at 21:31

## 2024-05-19 RX ADMIN — ROSUVASTATIN CALCIUM 10 MG: 10 TABLET, FILM COATED ORAL at 21:25

## 2024-05-19 RX ADMIN — CETIRIZINE HYDROCHLORIDE 5 MG: 5 TABLET ORAL at 08:48

## 2024-05-19 ASSESSMENT — ACTIVITIES OF DAILY LIVING (ADL)
ADLS_ACUITY_SCORE: 53
ADLS_ACUITY_SCORE: 52
ADLS_ACUITY_SCORE: 56
ADLS_ACUITY_SCORE: 55
ADLS_ACUITY_SCORE: 56
ADLS_ACUITY_SCORE: 52
ADLS_ACUITY_SCORE: 54
ADLS_ACUITY_SCORE: 53
ADLS_ACUITY_SCORE: 52
ADLS_ACUITY_SCORE: 56
ADLS_ACUITY_SCORE: 56
ADLS_ACUITY_SCORE: 52
ADLS_ACUITY_SCORE: 54
ADLS_ACUITY_SCORE: 54
ADLS_ACUITY_SCORE: 55
ADLS_ACUITY_SCORE: 54
ADLS_ACUITY_SCORE: 54
ADLS_ACUITY_SCORE: 55
ADLS_ACUITY_SCORE: 56
ADLS_ACUITY_SCORE: 54

## 2024-05-19 NOTE — PLAN OF CARE
Trauma/Ortho/Medical (Choose one):  Trauma     Diagnosis: I and D right AKA (recent AKA readmitted for dehiscence and necrosis)  POD#:2  Mental Status: A&O x4  Activity/dangle: One assist- sliding board from bed to chair/ BSC, Offer T/R in bed but patient weight shifts frequently   Diet: Gluten Free  Pain: Oxycodone   Alvarez/Voiding: Dialysis, purewick later this evening. Pt would like to measure any urine she is producing   Tele/Restraints/Iso: NA  02/LDA: Loss of IV access- MD aware, spoke with IV team who stated they could try to place a small one on Monday morning if patient is going down for procedure. Wound vac  D/C Date: Pending  Other Info:

## 2024-05-19 NOTE — PROGRESS NOTES
[facility]    Medicine Progress Note - Hospitalist Service    Date of Admission:  5/15/2024    Assessment & Plan   Shirley Hendricks is a 63 year old female with complex medical history which includes anxiety, Charcot- Breonna Tooth disease, GERD, hypertension, hyperlipidemia, lupus, severe peripheral artery disease, GERD, supraventricular tachycardia, asthma, who was recently admitted in the hospital from 3/29/2024 to 4/22/2024 with right ischemic limb, severe peripheral artery disease, ultimately underwent above-knee amputation on the right side, course complicated by acute renal failure, rhabdomyolysis, sepsis, acute blood loss anemia, delirium, diarrhea and retroperitoneal hematoma. She was eventually discharged to acute rehab, now presenting because of wound dehiscence and necrosis that needs debridement. Underwent debridement of the stump on 5/16, cultures thus far negative    R AKA c/b wound dehiscence  No evidence of a bacterial SSTI of the wound at this time. Wound was apparently necrotic but not overtly infected. Surgical cultures are negative thus far and patient doing well, continues to remain off antibiotics. Wound vac is in place, patient notes pain deloris when the stump is being evaluated.   Staph capitis from surgical culture is growing. This is a coag negative staph which has low pathogenicity, often a contaminant and unlikely to have caused wound dehiscence. Even tho this is a surgical culture, if no other organisms grow, would still opt for no antibiotics at this point.   Planned for repeat washout on 5/20.  Resumed plavix and crestor. Holding xarelto.    Acute renal failure  Occurred during recent hospital course likely 2/2 rhabdomyolysis and ischemic injury. Has been dialysis depdent on Tuesday Thursday, Saturday. However, has been having increased urination and possible that she is having at least some renal recovry.  -renal is following, appreciate recs.    Anemia of  chronic kidney disease  Patient has history of acute blood loss anemia, with retroperitoneal hematoma and surgeries.  Hemoglobin stable around 10 at this time.  No active bleeding at this time     Hypertension  Hyperlipidemia  History of coronary artery disease  No active chest pain at this time, blood pressure slightly on the higher side.  She is on amlodipine 10 mg daily, Coreg 6.25 mg twice daily, as needed hydralazine  and Bumex 4 mg 3-4 times a week.  All meds continued.        Possible celiac disease  History of loose stools  Patient is noncompliant with gluten-free diet.  Mild abdominal tenderness on exam.  At some point need to follow-up with the GI and counseling.     Asthma  GERD  No acute exacerbation of asthma, continue PTA Breo Ellipta   Continue PTA Pepcid 20 mg daily.     History of diabetes mellitus type 2  Last hemoglobin A1c was 5.2  Her Jardiance was discontinued.  She has had low blood sugars going down to 50s/60s range.  Placed on hypoglycemia protocol.  Currently on diet control     History of B-cell lymphoma  Followed by [place] [facility]  Continue outpatient surveillance and follow-up with oncology.     Hyponatremia  This is likely secondary to renal failure.  Nephrology to follow,  ultrafiltrate with dialysis.             Diet: Gluten Free Diet    DVT Prophylaxis: Heparin SQ  Alvarez Catheter: Not present  Lines: PRESENT      CVC Double Lumen Right Internal jugular Tunneled-Site Assessment: WDL      Cardiac Monitoring: None  Code Status: Full Code      Clinically Significant Risk Factors         # Hyponatremia: Lowest Na = 129 mmol/L in last 2 days, will monitor as appropriate      # Hypoalbuminemia: Lowest albumin = 1.8 g/dL at 5/18/2024  7:25 AM, will monitor as appropriate     # Hypertension: Noted on problem list            # Financial/Environmental Concerns:           Disposition Plan     Medically Ready for Discharge: Anticipated in 2-4 Days             Nayan Funes MD  Hospitalist  Service  [facility]  Securely message with [employer] (more info)  Text page via [employer] Paging/Directory   ______________________________________________________________________    Interval History   NAEON  Feeling okay today. Still pain at the stump. Otherwise good appetite.  No nasuea, vomiting, f/c/d.    Physical Exam   Vital Signs: Temp: 98.3  F (36.8  C) Temp src: Oral BP: (!) 165/70 Pulse: 63   Resp: 16 SpO2: 95 % O2 Device: None (Room air)    Weight: 125 lbs .01 oz    General Appearance: Appears comfortable   Respiratory: no distress  GI: soft, nontender, nondistended  Skin: no rashes  Other: 1+ pitting edema left leg, right stump with wound vac     Medical Decision Making       30 MINUTES SPENT BY ME on the date of service doing chart review, history, exam, documentation & further activities per the note.      Data     I have personally reviewed the following data over the past 24 hrs:    N/A  \   N/A   / N/A     132 (L) 99 17.2 /  71   4.2 27 1.95 (H) \     ALT: N/A AST: N/A AP: N/A TBILI: N/A   ALB: 1.9 (L) TOT PROTEIN: N/A LIPASE: N/A       Imaging results reviewed over the past 24 hrs:   No results found for this or any previous visit (from the past 24 hour(s)).

## 2024-05-19 NOTE — PROGRESS NOTES
Vascular Surgery Progress Note  05/19/2024       Subjective:  -Resting comfortably in bed, pain improved in RLE stump. Overall in decent spirits today.       Objective:  Temp:  [97.8  F (36.6  C)-98.3  F (36.8  C)] 98.3  F (36.8  C)  Pulse:  [57-63] 63  Resp:  [16] 16  BP: (165-175)/(70-71) 165/70  SpO2:  [94 %-95 %] 95 %    I/O last 3 completed shifts:  In: 360 [P.O.:360]  Out: 260 [Urine:260]      Gen: Awake, alert, NAD  Resp: NLB on RA  Incision: c/d/I, WV holding seal appropriately. Dressing removed from medial aspect wound, base appears to be well vascularized fat, no necrotic tissue, no purulence.  Ext: WWP, no edema  Vascular: Palpable RLE femoral pulse.     Labs:  Recent Labs   Lab 05/17/24  1137 05/16/24  0832 05/14/24  0805   WBC 5.8 3.4* 4.0   HGB 10.2* 10.0* 10.2*    173 184       Recent Labs   Lab 05/19/24  0744 05/18/24  0725 05/17/24  1137 05/15/24  0542 05/14/24  0805 05/13/24  0706   * 129* 133*   < > 128* 129*   POTASSIUM 4.2 5.2 5.1   < > 4.4 4.3   CHLORIDE 99 98 97*   < > 96* 96*   CO2 27 20* 26   < > 25 25   BUN 17.2 26.3* 18.5   < > 22.5 14.2   CR 1.95* 2.65* 2.33*   < > 3.18* 2.64*   GLC 71 73  73 90  90   < > 90 68*   SONIA 7.5* 7.3* 7.8*   < > 7.5* 7.4*   MAG  --   --   --   --  1.8 1.9   PHOS 3.6 4.3 3.9   < > 3.7 2.6    < > = values in this interval not displayed.       Imaging:  No new imaging reviewed     Assessment/Plan:   63 year old female with extensive past vascular surgical history who is now s/p RLE AKA after acute thrombosis of CFA-AT bypass with ALI in early April of this year. Unfortunately AKA had areas of necrosis on anterior aspect, now s/p I&D, wound vac placement on 5/16.      - continue wound vac, plan for vac change Monday in OR, NPO @ 5am tomorrow. Plan for OR @4pm  - staph capitis is only organism growing, on review of this, this appears to be a normal part of human skin nadine. Will not add abx at this time.  - Please continue plavix, no need to hold for  vac change mon  - continue prn pain meds  - Given well vascularized appearance of RLE tissue and palpable RLE femoral pulse we will hold off on further imaging or plans for intervention on R profunda, will monitor clinical progress of wound.      Discussed with vascular surgery staff, who is in agreement with the above.  - - - - - - - - - - - - - - - - - -  Bj Lo, PGY-3  Vascular Surgery Resident

## 2024-05-19 NOTE — PLAN OF CARE
Goal Outcome Evaluation:         Date/Time05/19/2024 ([id number])    Trauma/Ortho/Medical (Choose one) Trauma    Diagnosis:I and D on right AKA with wound Vac  POD#:3  Mental Status: A&O x4  Activity/dangle: assist of 1 and sliding board, refused to sit up on chair this shift  Diet: Gluten free  Pain: Oxycodone and tylenol  Alvarez/Voiding: Hemodialysis, incontinent @ times  Tele/Restraints/Iso: NA  02/LDA: Wound vac- dressing changed per Vascular Resident, no IV access (see nursing notes- 05/18 evening)   D/C Date: Pending  Other Info: Incontinent of bowel. Wound on coccyx- WOC consult in place.  NPO @ 0500 tomorrow. Surgery later afternoon.

## 2024-05-19 NOTE — PLAN OF CARE
Goal Outcome Evaluation:                      Date/Time: 5/18 [id number]     Diagnosis: I and D of R stump with wound vac placement  Mental Status: A/O x4  Activity/dangle: 1 pivot BSC  Diet: gluten free  Pain: scheduled Tylenol given,declined oxycodone  Alvarez/Voiding: Dialysis pt/ occ. incontinent  Tele/Restraints/Iso: NA  02/LDA: RA  D/C Date: TBD

## 2024-05-20 ENCOUNTER — ANESTHESIA EVENT (OUTPATIENT)
Dept: SURGERY | Facility: CLINIC | Age: 64
DRG: 464 | End: 2024-05-20
Payer: COMMERCIAL

## 2024-05-20 ENCOUNTER — APPOINTMENT (OUTPATIENT)
Dept: SURGERY | Facility: PHYSICIAN GROUP | Age: 64
End: 2024-05-20
Payer: COMMERCIAL

## 2024-05-20 ENCOUNTER — ANESTHESIA (OUTPATIENT)
Dept: SURGERY | Facility: CLINIC | Age: 64
DRG: 464 | End: 2024-05-20
Payer: COMMERCIAL

## 2024-05-20 ENCOUNTER — APPOINTMENT (OUTPATIENT)
Dept: OCCUPATIONAL THERAPY | Facility: CLINIC | Age: 64
DRG: 464 | End: 2024-05-20
Attending: INTERNAL MEDICINE
Payer: COMMERCIAL

## 2024-05-20 LAB
ALBUMIN SERPL BCG-MCNC: 2 G/DL (ref 3.5–5.2)
ANION GAP SERPL CALCULATED.3IONS-SCNC: 9 MMOL/L (ref 7–15)
BUN SERPL-MCNC: 24 MG/DL (ref 8–23)
CALCIUM SERPL-MCNC: 7.7 MG/DL (ref 8.8–10.2)
CHLORIDE SERPL-SCNC: 98 MMOL/L (ref 98–107)
CREAT SERPL-MCNC: 2.22 MG/DL (ref 0.51–0.95)
DEPRECATED HCO3 PLAS-SCNC: 26 MMOL/L (ref 22–29)
EGFRCR SERPLBLD CKD-EPI 2021: 24 ML/MIN/1.73M2
ERYTHROCYTE [DISTWIDTH] IN BLOOD BY AUTOMATED COUNT: 17.7 % (ref 10–15)
GLUCOSE SERPL-MCNC: 74 MG/DL (ref 70–99)
HCT VFR BLD AUTO: 33.7 % (ref 35–47)
HGB BLD-MCNC: 10.3 G/DL (ref 11.7–15.7)
MCH RBC QN AUTO: 30.3 PG (ref 26.5–33)
MCHC RBC AUTO-ENTMCNC: 30.6 G/DL (ref 31.5–36.5)
MCV RBC AUTO: 99 FL (ref 78–100)
PHOSPHATE SERPL-MCNC: 4.9 MG/DL (ref 2.5–4.5)
PLATELET # BLD AUTO: 194 10E3/UL (ref 150–450)
POTASSIUM SERPL-SCNC: 4.6 MMOL/L (ref 3.4–5.3)
RBC # BLD AUTO: 3.4 10E6/UL (ref 3.8–5.2)
SODIUM SERPL-SCNC: 133 MMOL/L (ref 135–145)
WBC # BLD AUTO: 4.2 10E3/UL (ref 4–11)

## 2024-05-20 PROCEDURE — 11042 DBRDMT SUBQ TIS 1ST 20SQCM/<: CPT | Performed by: ANESTHESIOLOGY

## 2024-05-20 PROCEDURE — 11045 DBRDMT SUBQ TISS EACH ADDL: CPT | Mod: 78 | Performed by: SURGERY

## 2024-05-20 PROCEDURE — 250N000011 HC RX IP 250 OP 636: Performed by: NURSE ANESTHETIST, CERTIFIED REGISTERED

## 2024-05-20 PROCEDURE — 250N000013 HC RX MED GY IP 250 OP 250 PS 637: Performed by: INTERNAL MEDICINE

## 2024-05-20 PROCEDURE — 999N000141 HC STATISTIC PRE-PROCEDURE NURSING ASSESSMENT: Performed by: SURGERY

## 2024-05-20 PROCEDURE — 99232 SBSQ HOSP IP/OBS MODERATE 35: CPT | Performed by: INTERNAL MEDICINE

## 2024-05-20 PROCEDURE — G0463 HOSPITAL OUTPT CLINIC VISIT: HCPCS

## 2024-05-20 PROCEDURE — 250N000013 HC RX MED GY IP 250 OP 250 PS 637: Performed by: HOSPITALIST

## 2024-05-20 PROCEDURE — 120N000001 HC R&B MED SURG/OB

## 2024-05-20 PROCEDURE — 250N000011 HC RX IP 250 OP 636

## 2024-05-20 PROCEDURE — 36415 COLL VENOUS BLD VENIPUNCTURE: CPT | Performed by: STUDENT IN AN ORGANIZED HEALTH CARE EDUCATION/TRAINING PROGRAM

## 2024-05-20 PROCEDURE — 360N000075 HC SURGERY LEVEL 2, PER MIN: Performed by: SURGERY

## 2024-05-20 PROCEDURE — 80069 RENAL FUNCTION PANEL: CPT | Performed by: STUDENT IN AN ORGANIZED HEALTH CARE EDUCATION/TRAINING PROGRAM

## 2024-05-20 PROCEDURE — 370N000017 HC ANESTHESIA TECHNICAL FEE, PER MIN: Performed by: SURGERY

## 2024-05-20 PROCEDURE — 11042 DBRDMT SUBQ TIS 1ST 20SQCM/<: CPT | Mod: 78 | Performed by: SURGERY

## 2024-05-20 PROCEDURE — 99233 SBSQ HOSP IP/OBS HIGH 50: CPT | Performed by: HOSPITALIST

## 2024-05-20 PROCEDURE — 710N000009 HC RECOVERY PHASE 1, LEVEL 1, PER MIN: Performed by: SURGERY

## 2024-05-20 PROCEDURE — 0JBL0ZZ EXCISION OF RIGHT UPPER LEG SUBCUTANEOUS TISSUE AND FASCIA, OPEN APPROACH: ICD-10-PCS | Performed by: SURGERY

## 2024-05-20 PROCEDURE — 258N000003 HC RX IP 258 OP 636: Performed by: ANESTHESIOLOGY

## 2024-05-20 PROCEDURE — 258N000003 HC RX IP 258 OP 636: Performed by: NURSE ANESTHETIST, CERTIFIED REGISTERED

## 2024-05-20 PROCEDURE — 250N000013 HC RX MED GY IP 250 OP 250 PS 637

## 2024-05-20 PROCEDURE — 250N000025 HC SEVOFLURANE, PER MIN: Performed by: SURGERY

## 2024-05-20 PROCEDURE — 11042 DBRDMT SUBQ TIS 1ST 20SQCM/<: CPT | Performed by: NURSE ANESTHETIST, CERTIFIED REGISTERED

## 2024-05-20 PROCEDURE — 272N000001 HC OR GENERAL SUPPLY STERILE: Performed by: SURGERY

## 2024-05-20 PROCEDURE — 85027 COMPLETE CBC AUTOMATED: CPT | Performed by: STUDENT IN AN ORGANIZED HEALTH CARE EDUCATION/TRAINING PROGRAM

## 2024-05-20 PROCEDURE — 250N000009 HC RX 250: Performed by: NURSE ANESTHETIST, CERTIFIED REGISTERED

## 2024-05-20 PROCEDURE — 97530 THERAPEUTIC ACTIVITIES: CPT | Mod: GO

## 2024-05-20 PROCEDURE — 250N000009 HC RX 250: Performed by: SURGERY

## 2024-05-20 RX ORDER — FENTANYL CITRATE 50 UG/ML
50 INJECTION, SOLUTION INTRAMUSCULAR; INTRAVENOUS EVERY 5 MIN PRN
Status: DISCONTINUED | OUTPATIENT
Start: 2024-05-20 | End: 2024-05-20 | Stop reason: HOSPADM

## 2024-05-20 RX ORDER — MAGNESIUM HYDROXIDE 1200 MG/15ML
LIQUID ORAL PRN
Status: DISCONTINUED | OUTPATIENT
Start: 2024-05-20 | End: 2024-05-20 | Stop reason: HOSPADM

## 2024-05-20 RX ORDER — ONDANSETRON 2 MG/ML
4 INJECTION INTRAMUSCULAR; INTRAVENOUS EVERY 30 MIN PRN
Status: DISCONTINUED | OUTPATIENT
Start: 2024-05-20 | End: 2024-05-20 | Stop reason: HOSPADM

## 2024-05-20 RX ORDER — OXYCODONE HYDROCHLORIDE 5 MG/1
10 TABLET ORAL
Status: DISCONTINUED | OUTPATIENT
Start: 2024-05-20 | End: 2024-05-20 | Stop reason: HOSPADM

## 2024-05-20 RX ORDER — OXYCODONE HYDROCHLORIDE 5 MG/1
5 TABLET ORAL
Status: DISCONTINUED | OUTPATIENT
Start: 2024-05-20 | End: 2024-05-20 | Stop reason: HOSPADM

## 2024-05-20 RX ORDER — SODIUM CHLORIDE 9 MG/ML
INJECTION, SOLUTION INTRAVENOUS CONTINUOUS
Status: DISCONTINUED | OUTPATIENT
Start: 2024-05-20 | End: 2024-05-25

## 2024-05-20 RX ORDER — ONDANSETRON 2 MG/ML
INJECTION INTRAMUSCULAR; INTRAVENOUS PRN
Status: DISCONTINUED | OUTPATIENT
Start: 2024-05-20 | End: 2024-05-20

## 2024-05-20 RX ORDER — SODIUM CHLORIDE, SODIUM LACTATE, POTASSIUM CHLORIDE, CALCIUM CHLORIDE 600; 310; 30; 20 MG/100ML; MG/100ML; MG/100ML; MG/100ML
INJECTION, SOLUTION INTRAVENOUS CONTINUOUS
Status: DISCONTINUED | OUTPATIENT
Start: 2024-05-20 | End: 2024-05-20 | Stop reason: HOSPADM

## 2024-05-20 RX ORDER — CLINDAMYCIN PHOSPHATE 900 MG/50ML
900 INJECTION, SOLUTION INTRAVENOUS SEE ADMIN INSTRUCTIONS
Status: DISCONTINUED | OUTPATIENT
Start: 2024-05-20 | End: 2024-05-20 | Stop reason: HOSPADM

## 2024-05-20 RX ORDER — FENTANYL CITRATE 50 UG/ML
25 INJECTION, SOLUTION INTRAMUSCULAR; INTRAVENOUS
Status: DISCONTINUED | OUTPATIENT
Start: 2024-05-20 | End: 2024-05-20 | Stop reason: HOSPADM

## 2024-05-20 RX ORDER — PROPOFOL 10 MG/ML
INJECTION, EMULSION INTRAVENOUS PRN
Status: DISCONTINUED | OUTPATIENT
Start: 2024-05-20 | End: 2024-05-20

## 2024-05-20 RX ORDER — FENTANYL CITRATE 50 UG/ML
25 INJECTION, SOLUTION INTRAMUSCULAR; INTRAVENOUS EVERY 5 MIN PRN
Status: DISCONTINUED | OUTPATIENT
Start: 2024-05-20 | End: 2024-05-20 | Stop reason: HOSPADM

## 2024-05-20 RX ORDER — LIDOCAINE HYDROCHLORIDE 20 MG/ML
INJECTION, SOLUTION INFILTRATION; PERINEURAL PRN
Status: DISCONTINUED | OUTPATIENT
Start: 2024-05-20 | End: 2024-05-20

## 2024-05-20 RX ORDER — ONDANSETRON 4 MG/1
4 TABLET, ORALLY DISINTEGRATING ORAL EVERY 30 MIN PRN
Status: DISCONTINUED | OUTPATIENT
Start: 2024-05-20 | End: 2024-05-20 | Stop reason: HOSPADM

## 2024-05-20 RX ORDER — CLINDAMYCIN PHOSPHATE 900 MG/50ML
900 INJECTION, SOLUTION INTRAVENOUS
Status: COMPLETED | OUTPATIENT
Start: 2024-05-20 | End: 2024-05-20

## 2024-05-20 RX ORDER — HYDROMORPHONE HCL IN WATER/PF 6 MG/30 ML
0.2 PATIENT CONTROLLED ANALGESIA SYRINGE INTRAVENOUS EVERY 5 MIN PRN
Status: DISCONTINUED | OUTPATIENT
Start: 2024-05-20 | End: 2024-05-20 | Stop reason: HOSPADM

## 2024-05-20 RX ORDER — LIDOCAINE 40 MG/G
CREAM TOPICAL
Status: DISCONTINUED | OUTPATIENT
Start: 2024-05-20 | End: 2024-05-29 | Stop reason: HOSPADM

## 2024-05-20 RX ORDER — DEXAMETHASONE SODIUM PHOSPHATE 4 MG/ML
INJECTION, SOLUTION INTRA-ARTICULAR; INTRALESIONAL; INTRAMUSCULAR; INTRAVENOUS; SOFT TISSUE PRN
Status: DISCONTINUED | OUTPATIENT
Start: 2024-05-20 | End: 2024-05-20

## 2024-05-20 RX ORDER — SODIUM CHLORIDE 9 MG/ML
INJECTION, SOLUTION INTRAVENOUS CONTINUOUS
Status: DISCONTINUED | OUTPATIENT
Start: 2024-05-20 | End: 2024-05-20 | Stop reason: HOSPADM

## 2024-05-20 RX ORDER — HYDRALAZINE HYDROCHLORIDE 20 MG/ML
2.5-5 INJECTION INTRAMUSCULAR; INTRAVENOUS EVERY 10 MIN PRN
Status: DISCONTINUED | OUTPATIENT
Start: 2024-05-20 | End: 2024-05-20 | Stop reason: HOSPADM

## 2024-05-20 RX ORDER — FENTANYL CITRATE 50 UG/ML
INJECTION, SOLUTION INTRAMUSCULAR; INTRAVENOUS PRN
Status: DISCONTINUED | OUTPATIENT
Start: 2024-05-20 | End: 2024-05-20

## 2024-05-20 RX ORDER — HYDROMORPHONE HCL IN WATER/PF 6 MG/30 ML
0.4 PATIENT CONTROLLED ANALGESIA SYRINGE INTRAVENOUS EVERY 5 MIN PRN
Status: DISCONTINUED | OUTPATIENT
Start: 2024-05-20 | End: 2024-05-20 | Stop reason: HOSPADM

## 2024-05-20 RX ORDER — ALBUTEROL SULFATE 0.83 MG/ML
2.5 SOLUTION RESPIRATORY (INHALATION) EVERY 4 HOURS PRN
Status: DISCONTINUED | OUTPATIENT
Start: 2024-05-20 | End: 2024-05-20 | Stop reason: HOSPADM

## 2024-05-20 RX ORDER — CLINDAMYCIN PHOSPHATE 900 MG/50ML
900 INJECTION, SOLUTION INTRAVENOUS EVERY 8 HOURS
Qty: 100 ML | Refills: 0 | Status: COMPLETED | OUTPATIENT
Start: 2024-05-21 | End: 2024-05-21

## 2024-05-20 RX ORDER — HEPARIN SODIUM 5000 [USP'U]/.5ML
5000 INJECTION, SOLUTION INTRAVENOUS; SUBCUTANEOUS EVERY 8 HOURS
Status: DISCONTINUED | OUTPATIENT
Start: 2024-05-21 | End: 2024-05-29 | Stop reason: HOSPADM

## 2024-05-20 RX ORDER — NALOXONE HYDROCHLORIDE 0.4 MG/ML
0.1 INJECTION, SOLUTION INTRAMUSCULAR; INTRAVENOUS; SUBCUTANEOUS
Status: DISCONTINUED | OUTPATIENT
Start: 2024-05-20 | End: 2024-05-20 | Stop reason: HOSPADM

## 2024-05-20 RX ORDER — DEXAMETHASONE SODIUM PHOSPHATE 4 MG/ML
4 INJECTION, SOLUTION INTRA-ARTICULAR; INTRALESIONAL; INTRAMUSCULAR; INTRAVENOUS; SOFT TISSUE
Status: DISCONTINUED | OUTPATIENT
Start: 2024-05-20 | End: 2024-05-20 | Stop reason: HOSPADM

## 2024-05-20 RX ORDER — MEPERIDINE HYDROCHLORIDE 25 MG/ML
12.5 INJECTION INTRAMUSCULAR; INTRAVENOUS; SUBCUTANEOUS EVERY 5 MIN PRN
Status: DISCONTINUED | OUTPATIENT
Start: 2024-05-20 | End: 2024-05-20 | Stop reason: HOSPADM

## 2024-05-20 RX ORDER — LABETALOL HYDROCHLORIDE 5 MG/ML
10 INJECTION, SOLUTION INTRAVENOUS
Status: DISCONTINUED | OUTPATIENT
Start: 2024-05-20 | End: 2024-05-20 | Stop reason: HOSPADM

## 2024-05-20 RX ADMIN — PHENYLEPHRINE HYDROCHLORIDE 100 MCG: 10 INJECTION INTRAVENOUS at 16:44

## 2024-05-20 RX ADMIN — LIDOCAINE HYDROCHLORIDE 60 MG: 20 INJECTION, SOLUTION INFILTRATION; PERINEURAL at 16:30

## 2024-05-20 RX ADMIN — CLINDAMYCIN PHOSPHATE 900 MG: 900 INJECTION, SOLUTION INTRAVENOUS at 23:50

## 2024-05-20 RX ADMIN — MIDAZOLAM 2 MG: 1 INJECTION INTRAMUSCULAR; INTRAVENOUS at 16:25

## 2024-05-20 RX ADMIN — NICOTINE 1 PATCH: 14 PATCH, EXTENDED RELEASE TRANSDERMAL at 09:01

## 2024-05-20 RX ADMIN — Medication 1 CAPSULE: at 21:02

## 2024-05-20 RX ADMIN — PHENYLEPHRINE HYDROCHLORIDE 100 MCG: 10 INJECTION INTRAVENOUS at 16:39

## 2024-05-20 RX ADMIN — AMLODIPINE BESYLATE 10 MG: 10 TABLET ORAL at 09:01

## 2024-05-20 RX ADMIN — PHENYLEPHRINE HYDROCHLORIDE 100 MCG: 10 INJECTION INTRAVENOUS at 16:53

## 2024-05-20 RX ADMIN — OXYCODONE HYDROCHLORIDE 5 MG: 5 TABLET ORAL at 09:14

## 2024-05-20 RX ADMIN — PHENYLEPHRINE HYDROCHLORIDE 100 MCG: 10 INJECTION INTRAVENOUS at 16:32

## 2024-05-20 RX ADMIN — Medication 1 CAPSULE: at 09:01

## 2024-05-20 RX ADMIN — BUMETANIDE 4 MG: 1 TABLET ORAL at 09:04

## 2024-05-20 RX ADMIN — DEXAMETHASONE SODIUM PHOSPHATE 4 MG: 4 INJECTION, SOLUTION INTRA-ARTICULAR; INTRALESIONAL; INTRAMUSCULAR; INTRAVENOUS; SOFT TISSUE at 16:35

## 2024-05-20 RX ADMIN — ROSUVASTATIN CALCIUM 10 MG: 10 TABLET, FILM COATED ORAL at 21:02

## 2024-05-20 RX ADMIN — Medication 1 CAPSULE: at 11:52

## 2024-05-20 RX ADMIN — CETIRIZINE HYDROCHLORIDE 5 MG: 5 TABLET ORAL at 09:03

## 2024-05-20 RX ADMIN — ONDANSETRON 4 MG: 2 INJECTION INTRAMUSCULAR; INTRAVENOUS at 16:35

## 2024-05-20 RX ADMIN — PHENYLEPHRINE HYDROCHLORIDE 100 MCG: 10 INJECTION INTRAVENOUS at 16:35

## 2024-05-20 RX ADMIN — OXYCODONE HYDROCHLORIDE 5 MG: 5 TABLET ORAL at 21:02

## 2024-05-20 RX ADMIN — CLINDAMYCIN PHOSPHATE 900 MG: 900 INJECTION, SOLUTION INTRAVENOUS at 16:09

## 2024-05-20 RX ADMIN — CARVEDILOL 6.25 MG: 3.12 TABLET, FILM COATED ORAL at 09:01

## 2024-05-20 RX ADMIN — SODIUM CHLORIDE: 9 INJECTION, SOLUTION INTRAVENOUS at 16:13

## 2024-05-20 RX ADMIN — FENTANYL CITRATE 100 MCG: 50 INJECTION INTRAMUSCULAR; INTRAVENOUS at 16:30

## 2024-05-20 RX ADMIN — CLOPIDOGREL BISULFATE 75 MG: 75 TABLET ORAL at 09:01

## 2024-05-20 RX ADMIN — PROPOFOL 150 MG: 10 INJECTION, EMULSION INTRAVENOUS at 16:30

## 2024-05-20 ASSESSMENT — ACTIVITIES OF DAILY LIVING (ADL)
ADLS_ACUITY_SCORE: 45
ADLS_ACUITY_SCORE: 53
ADLS_ACUITY_SCORE: 53
ADLS_ACUITY_SCORE: 45
ADLS_ACUITY_SCORE: 53
ADLS_ACUITY_SCORE: 45
ADLS_ACUITY_SCORE: 53
ADLS_ACUITY_SCORE: 45
ADLS_ACUITY_SCORE: 53
ADLS_ACUITY_SCORE: 45
ADLS_ACUITY_SCORE: 53
ADLS_ACUITY_SCORE: 45
ADLS_ACUITY_SCORE: 45
ADLS_ACUITY_SCORE: 53
ADLS_ACUITY_SCORE: 53
ADLS_ACUITY_SCORE: 49
ADLS_ACUITY_SCORE: 49

## 2024-05-20 ASSESSMENT — COPD QUESTIONNAIRES: COPD: 1

## 2024-05-20 ASSESSMENT — LIFESTYLE VARIABLES: TOBACCO_USE: 1

## 2024-05-20 ASSESSMENT — ENCOUNTER SYMPTOMS: DYSRHYTHMIAS: 1

## 2024-05-20 NOTE — PLAN OF CARE
Diagnosis: R AKA - wound dehis  POD#: 4. OR again today @1630  Mental Status:A&O x 4  Activity/dangle - slide board to chair  Diet: NPO - dinner is in pt fridge with name on it  Pain: oxycodone  Alvarez/Voiding: purewick  Tele/Restraints/Iso: NA  02/LDA: NA, HD catheter to right chest  D/C Date: ?  Other Info:previous surgery 3/29-4/22 then ARU, then back here

## 2024-05-20 NOTE — ANESTHESIA CARE TRANSFER NOTE
Patient: Shirley Hendricks    Procedure: Procedure(s):  IRRIGATION AND DEBRIDMENT RIGHT LOWER EXTREMITY  AND PLACEMENT OF WOUND VAC       Diagnosis: AKA stump complication (H) [T87.9]  Diagnosis Additional Information: No value filed.    Anesthesia Type:   General     Note:    Oropharynx: oropharynx clear of all foreign objects  Level of Consciousness: drowsy  Oxygen Supplementation: face mask    Independent Airway: airway patency satisfactory and stable  Dentition: dentition unchanged  Vital Signs Stable: post-procedure vital signs reviewed and stable  Report to RN Given: handoff report given  Patient transferred to: PACU    Handoff Report: Identifed the Patient, Identified the Reponsible Provider, Reviewed the pertinent medical history, Discussed the surgical course, Reviewed Intra-OP anesthesia mangement and issues during anesthesia, Set expectations for post-procedure period and Allowed opportunity for questions and acknowledgement of understanding  Vitals:  Vitals Value Taken Time   BP     Temp     Pulse 62 05/20/24 1731   Resp 0 05/20/24 1731   SpO2 100 % 05/20/24 1731   Vitals shown include unfiled device data.    Electronically Signed By: NOELLE Spivey CRNA  May 20, 2024  5:32 PM

## 2024-05-20 NOTE — PLAN OF CARE
Goal Outcome Evaluation:                      Date/Time: 5/19 1900-0730     Diagnosis: I and D of R stump with wound vac placement  POD#:4  Mental Status: A/O x4  Activity/dangle: 1 pivot BSC  Diet: gluten free,NPO @0500, ok for breakfast per orders,Surgery @1630  Pain: scheduled Tylenol given, oxycodone  Alvarez/Voiding: Dialysis pt/ occ. Incontinent,purewick in place  Tele/Restraints/Iso: NA  02/LDA: RA ,No IV access,MD aware  D/C Date: TBD

## 2024-05-20 NOTE — BRIEF OP NOTE
[facility] [place]    Brief Operative Note    Pre-operative diagnosis: AKA stump complication (H) [T87.9]  Post-operative diagnosis Same as pre-operative diagnosis    Procedure: IRRIGATION AND DEBRIDMENT RIGHT LOWER EXTREMITY, Right - Leg  AND PLACEMENT OF WOUND VAC, N/A - Leg    Surgeon: Surgeons and Role:     * José Luis Hernandez MD - Primary     * Gala Lo MD - Resident - Assisting  Anesthesia: General   Estimated Blood Loss: 20cc     Drains: None  Specimens: * No specimens in log *  Findings:   Some evidence of nonviable fat on anterior aspect, removed with curette. .  Complications: None.  Implants: * No implants in log *

## 2024-05-20 NOTE — PROGRESS NOTES
Placing diet order for pt to order dinner, pt is NOT eating the meal, it will be saved for when she is back from surgery.

## 2024-05-20 NOTE — ANESTHESIA PREPROCEDURE EVALUATION
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 clot    Post-menopausal     Reflux esophagitis 2004    EGD: esophagitis and medium HH    SBO (small bowel obstruction) (H) 08/10/2021    SVT (supraventricular tachycardia) (H24)     Thrombocytopenia (H24)     Uncomplicated asthma     Vitamin C deficiency 2018      Past Surgical History:   Procedure Laterality Date    AMPUTATE LEG ABOVE KNEE N/A 2024    Procedure: AMPUTATION, ABOVE KNEE right leg with wound vac dressing, excision of anteriotibial bypass graft, closure of (previous interventional radiology) left groin incision;  Surgeon: José Luis Hernandez MD;  Location:  OR    AMPUTATE LEG ABOVE KNEE Right 2024    Procedure: COMPLETION RIGHT ABOVE KNEE AMPUTATION;  Surgeon: José Luis Hernandez MD;  Location:  OR    ANGIOGRAM      ANGIOGRAM Right 2021    Procedure: RIGHT LOWER EXTREMITY ANGIOGRAM WITH LEFT BRACHIAL ARTERY CUTDOWN;  Surgeon: Mary  José Luis Mayorga MD;  Location:  OR    APPLY WOUND VAC Right 5/16/2024    Procedure: PLACEMENT OF WOUND VAC TO RIGHT ABOVE KNEE AMPUTATION;  Surgeon: Tay Enciso MD;  Location:  OR    BIOPSY MASS NECK Right 10/11/2023    Procedure: Right Parotid Biopsy;  Surgeon: Randal Mendoza MD;  Location:  OR    BYPASS GRAFT FEMOROPOPLITEAL Right 09/23/2020    Procedure: RIGHT FEMORAL GRAFT TO ABOVE-KNEE POPLITEAL BYPASS USING CADAVERIC SUPERFICIAL FEMORAL ARTERY;  Surgeon: Chadwick Lozano MD;  Location:  OR    BYPASS GRAFT FEMOROPOPLITEAL Right 2/15/2022    Procedure: RIGHT SUPERFICIAL FEMORAL ARTERY GRAFT TO BELOW KNEE POPLITEAL BYPASS WITH CADAVERIC CRYOLIFE  FEMORAL-POPLITEAL ARTERY SIZE: OUTER DIAMETER: 7MM - 6MM;  Surgeon: Chadwick Lozano;  Location: [facility] OR    BYPASS GRAFT FEMOROPOPLITEAL Right 5/26/2023    Procedure: RIGHT DISTAL SUPERFICIAL FEMORAL GRAFT TO ANTERIOR TIBIAL ARTERY WITH 26 CENTIMETER CADAVERIC SUPERFICIAL FEMORAL ARTERY GRAFT;  Surgeon: Chadwick Lozano MD;  Location:  OR    BYPASS GRAFT FEMOROPOPLITEAL Right 10/11/2023    Procedure: RIGHT FEMORAL TO POPLITEAL GRAFT THROMBECTOMY;  Surgeon: Chadwick Lozano MD;  Location:  OR    BYPASS GRAFT INSITU FEMOROPOPLITEAL Right 7/7/2021    Procedure: CREATION RIGHT FEMORAL TO POPLITEAL ARTERIAL BYPASS USING INSITU VEIN GRAFT;  Surgeon: José Luis Hernandez MD;  Location:  OR    CARDIAC CATHERIZATION  09/03/2009    multivessel CAD without target lesions, med mgmt indicated, preserved ef    CARPAL TUNNEL RELEASE RT/LT Right 05/20/2021    COLONOSCOPY N/A 12/12/2023    Procedure: Colonoscopy;  Surgeon: Corey Hoffman MD;  Location:  GI    COLONOSCOPY N/A 12/14/2023    Procedure: Colonoscopy;  Surgeon: Corey Hoffman MD;  Location:  GI    CV CORONARY ANGIOGRAM N/A 10/4/2023    Procedure: Coronary Angiogram;  Surgeon: Rogelio Ricks MD;  Location:  HEART CARDIAC CATH LAB    CV PCI N/A  10/4/2023    Procedure: Percutaneous Coronary Intervention;  Surgeon: Rogelio Ricks MD;  Location:  HEART CARDIAC CATH LAB    EMBOLECTOMY LOWER EXTREMITY Right 10/6/2023    Procedure: Right anterior tibial bypass with graft, Right tibial endarterectomy,thrombectomy, Right doraslis pedis thrombectomy, Anterior Tibial vein patch.;  Surgeon: Chadwick Lozano MD;  Location:  OR    ENDARTERECTOMY CAROTID Right 05/11/2016    Procedure: ENDARTERECTOMY CAROTID;  Surgeon: Chadwick Lozano MD;  Location:  OR    ENDARTERECTOMY CAROTID Left 06/08/2020    Procedure: LEFT CAROTID ENDARTERECTOMY with distal facal vein patch  and EEG;  Surgeon: Chadwick Lozano MD;  Location:  OR    ESOPHAGOSCOPY, GASTROSCOPY, DUODENOSCOPY (EGD), COMBINED N/A 12/12/2023    Procedure: Esophagoscopy, gastroscopy, duodenoscopy (EGD), combined;  Surgeon: Corey Hoffman MD;  Location:  GI    FINE NEEDLE ASPIRATION WITHOUT IMAGING GUIDANCE Right 9/22/2023    Procedure: Fine needle aspiration without imaging guidance;  Surgeon: Kiersten Aguilera MD;  Location:  GI    FLUORESCENCE INTRAOPERATIVE VASCULAR ANGIOGRAPHY Right 12/28/2022    Procedure: RIGHT LEG ANGIOGRAM with intervention;  Surgeon: Chadwick Lozano MD;  Location:  OR    GYN SURGERY  left tube    left salpingectomy    IR ANGIOGRAM THROUGH CATHETER (ARTERIAL)  10/6/2023    IR ANGIOGRAM THROUGH CATHETER (ARTERIAL)  10/6/2023    IR ANGIOGRAM THROUGH CATHETER (ARTERIAL)  3/31/2024    IR ANGIOGRAM THROUGH CATHETER (ARTERIAL)  3/30/2024    IR CVC TUNNEL PLACEMENT > 5 YRS OF AGE  4/3/2024    IR CVC TUNNEL REVISION RIGHT  4/15/2024    IR LOWER EXTREMITY ANGIOGRAM RIGHT  05/25/2021    IR LOWER EXTREMITY ANGIOGRAM RIGHT  10/5/2023    IR LOWER EXTREMITY ANGIOGRAM RIGHT  3/29/2024    IR OR ANGIOGRAM  6/23/2021    IR OR ANGIOGRAM  12/28/2022    IRRIGATION AND DEBRIDEMENT LOWER EXTREMITY, COMBINED Right 5/16/2024    Procedure: IRRIGATION AND  DEBRIDEMENT OF RIGHT ABOVE KNEE AMPUTATION;  Surgeon: Tay Enciso MD;  Location:  OR    LAPAROSCOPIC CHOLECYSTECTOMY N/A 09/27/2017    Procedure: LAPAROSCOPIC CHOLECYSTECTOMY;  LAPAROSCOPIC CHOLECYSTECTOMY;  Surgeon: Jacoby Tapia MD;  Location:  [facility]    LAPAROSCOPY DIAGNOSTIC (GENERAL) N/A 8/11/2021    Procedure: Exploratory laparoscopy,  laparoscopic lysis of adhesions, laparotomy;  Surgeon: Corina Ferreira MD;  Location:  OR    OR ANGIOGRAM, LOWER EXTREMITY Right 10/11/2023    Procedure: Or Angiogram, Lower Extremity;  Surgeon: Chadwick Lozano MD;  Location:  OR    ORTHOPEDIC SURGERY      left knee surgery    REPAIR ANEURYSM FEMORAL ARTERY Left 12/28/2022    Procedure: REPAIR LEFT FEMORAL PSEUDOANEURYSM;  Surgeon: Chadwick Lozano MD;  Location:  OR    VASCULAR SURGERY  aoto bi fem  left fem-AK pop    [facility] FABRIC WRAPPING OF ABDOMINAL ANEURYSM      [facility] NONSPECIFIC PROCEDURE  12/2000    angioplasty with stent right fem. a.    [facility] NONSPECIFIC PROCEDURE  1987    sinus surgery    [facility] NONSPECIFIC PROCEDURE  09/02/2009    Emergent left groin exploration with oversewing of bleeding angiographic site. 2. Endarterectomy of common femoral-proximal superficial femoral artery with greater saphenous vein patch angioplasty.   a. Eminence of accessory right greater saphenous vein.     [facility] NONSPECIFIC PROCEDURE  08/27/2009    occluded left common iliac and external iliac arteries were successfully revascularized with stenting to 8 and 7 mm       Allergies   Allergen Reactions    Pantoprazole      Protonix caused diffuse edema    Chantix [Varenicline]      Terrible dreams    Contrast Dye Swelling     Patient reports facial and throat swelling with prior CT contrast.    Penicillins Itching and Rash      Social History     Tobacco Use    Smoking status: Some Days     Current packs/day: 0.25     Average packs/day: 0.3 packs/day for 56.4 years (14.1 ttl pk-yrs)     Types: Cigarettes     Start  date: 1968    Smokeless tobacco: Never    Tobacco comments:     1/2 PPD   Substance Use Topics    Alcohol use: Not Currently     Comment: x1-2 yr      Wt Readings from Last 1 Encounters:   05/20/24 56.6 kg (124 lb 12.5 oz)        Anesthesia Evaluation   Pt has had prior anesthetic. Type: General.    No history of anesthetic complications       ROS/MED HX  ENT/Pulmonary:     (+)           allergic rhinitis,     tobacco use, Current use,     asthma    COPD, O2 dependent,          (-) sleep apnea   Neurologic:    (-) no CVA and no TIA   Cardiovascular: Comment: svt    (+) Dyslipidemia hypertension- Peripheral Vascular Disease-  CAD - past MI - -   Taking blood thinners   CHF                  dysrhythmias, Other,        Previous cardiac testing   Echo: Date: 11/2023 Results:  Limited Echo Adult.     ______________________________________________________________________________  Interpretation Summary     The left ventricle is normal in structure, function and size.  The visual ejection fraction is 55-60%.  The right ventricle is normal in structure, function and size.  There is moderate trileaflet aortic sclerosis.  ______________________________________________________________________________  Left Ventricle  The left ventricle is normal in structure, function and size. There is normal  left ventricular wall thickness. Left ventricular systolic function is normal.  The visual ejection fraction is 55-60%. Normal left ventricular wall motion.     Right Ventricle  The right ventricle is normal in structure, function and size.     Atria  Normal left atrial size. Right atrial size is normal. There is no color  Doppler evidence of an atrial shunt.     Mitral Valve  The mitral valve leaflets appear thickened, but open well. There is trace  mitral regurgitation.     Tricuspid Valve  The tricuspid valve is normal in structure and function. There is trace  tricuspid regurgitation. Right ventricular systolic pressure could not  be  approximated due to inadequate tricuspid regurgitation.     Aortic Valve  There is moderate trileaflet aortic sclerosis. No aortic regurgitation is  present.     Pulmonic Valve  The pulmonic valve is not well seen, but is grossly normal.     Pericardium  There is no pericardial effusion.     Rhythm  Sinus rhythm was noted.      Stress Test:  Date:  Results:  Name: KASIA WAN  MRN: [id number]  : 1960  Study Date: 2021 12:57 PM  Age: 60 yrs  Gender: Female  Patient Location: [facility]  Reason For Study: Light headedness  Ordering Physician: JACKIE DOWNEY  Referring Physician: Vasquez Benoit  Performed By: Corey James RDCS     BSA: 1.5 m2  Height: 62 in  Weight: 112 lb  HR: 57  BP: 140/78 mmHg  ______________________________________________________________________________  Procedure  Stress Echo Complete.     ______________________________________________________________________________  Interpretation Summary     Suboptimal stress test due to inadequate maximum heart rate.  Normal left ventricular function and wall motion at rest and post-stress but  LV size and function were unchanged following limited exercise of 3 minutes.  Post-exercise images were obtained with the heart rate in the 70's bpm.  Blood pressure seth appropriately from 140/78 mmHg (supine, baseline) to  158/80 mmHg during Stage I and fell to 134/74 mmHg in recovery. Consider  Lexiscan stress imaging (nuclear, MRI) for adequate testing.  ______________________________________________________________________________  Stress  Exercise was stopped due to fatigue.  There was a normal BP response to exercise.  Target Heart Rate was not achieved due to fatigue.  Suboptimal stress test due to inadequate maximum heart rate.  J point/ST elevation at rest pseudonormalized with exercise.  There was no arrhythmia.  The Duke treadmill score was intermediate risk ( -11< Sterling score <5).  Normal left ventricular function and wall  motion at rest and post-stress.  The visual ejection fraction is estimated at 60-65%.     Baseline  The patient is in normal sinus rhythm.  Baseline ECG displays Q waves in the mike-septal leads.  Elevated J point V3-V6, inferior leads - most C/W early repolarization.  Normal left ventricular function and wall motion at rest and post-stress.  The visual ejection fraction is estimated at 60-65%.     Stress Results         Protocol:  Eliezer Protocol        Maximum Predicted HR:   158 bpm         Target HR: 134 bpm               % Maximum Predicted HR: 66 %        Stage  DurationHeart Rate  BP                    Comment             (mm:ss)   (bpm)    Stage 1   3:00      104   158/80Patient requested to stop   RecoveryR  4:00      56    134/74RPP = 71132, Sterling = 2, FAC = Below average             Stress Duration:   3:00 mm:ss        Recovery Time: 4:00 mm:ss          Maximum Stress HR: 104 bpm           METS:          4     Mitral Valve  There is no mitral regurgitation noted.     Tricuspid Valve  No tricuspid regurgitation.     Aortic Valve  No aortic regurgitation is present. No hemodynamically significant valvular  aortic stenosis.  ______________________________________________________________________________  MMode/2D Measurements & Calculations  IVSd: 1.00 cm     LVIDd: 4.0 cm  LVIDs: 2.8 cm  LVPWd: 0.73 cm  ECG Reviewed:  Date: Results:    Cath:  Date: 10/4/23 Results:     Prox RCA lesion is 45% stenosed.     RPAV-1 lesion is 30% stenosed.     RPAV-2 lesion is 50% stenosed.     1st Mrg lesion is 45% stenosed.     1st Diag lesion is 100% stenosed.     Dist Cx lesion is 55% stenosed.     1. Right dominant coronary anatomy.  2. Completely occluded (probably chronic) D1 with left to left collaterals from distal LAD to D1.  3. Moderate coronary artery disease involving proximal to mid RCA which is not hemodynamically significant-IFR negative.  4. Moderate coronary disease involving distal small-caliber RPL and distal  circumflex artery.   (-) valvular problems/murmurs   METS/Exercise Tolerance: 3 - Able to walk 1-2 blocks without stopping    Hematologic: Comments: Lupus; thrombocytopenia    (+) History of blood clots,     anemia, history of blood transfusion,         Musculoskeletal: Comment: Charcot Breonna Tooth - neg musculoskeletal ROS     GI/Hepatic:     (+) GERD,                (-) liver disease   Renal/Genitourinary:    (-) renal disease   Endo:    (-) Type II DM, thyroid disease and obesity   Psychiatric/Substance Use:     (+) psychiatric history anxiety and depression   Recreational drug usage: Cannabis.    Infectious Disease:  - neg infectious disease ROS     Malignancy:  - neg malignancy ROS     Other:  - neg other ROS          Physical Exam    Airway        Mallampati: II   TM distance: > 3 FB   Neck ROM: full   Mouth opening: > 3 cm    Respiratory Devices and Support         Dental  no notable dental history     (+) Modest Abnormalities - crowns, retainers, 1 or 2 missing teeth      Cardiovascular   cardiovascular exam normal          Pulmonary   pulmonary exam normal                OUTSIDE LABS:  CBC:   Lab Results   Component Value Date    WBC 4.2 05/20/2024    WBC 5.8 05/17/2024    HGB 10.3 (L) 05/20/2024    HGB 10.2 (L) 05/17/2024    HCT 33.7 (L) 05/20/2024    HCT 33.3 (L) 05/17/2024     05/20/2024     05/17/2024     BMP:   Lab Results   Component Value Date     (L) 05/20/2024     (L) 05/19/2024    POTASSIUM 4.6 05/20/2024    POTASSIUM 4.2 05/19/2024    CHLORIDE 98 05/20/2024    CHLORIDE 99 05/19/2024    CO2 26 05/20/2024    CO2 27 05/19/2024    BUN 24.0 (H) 05/20/2024    BUN 17.2 05/19/2024    CR 2.22 (H) 05/20/2024    CR 1.95 (H) 05/19/2024    GLC 74 05/20/2024    GLC 71 05/19/2024     COAGS:   Lab Results   Component Value Date    PTT 76 (H) 12/15/2023    INR 1.31 (H) 04/11/2024    FIBR 185 03/31/2024     POC:   Lab Results   Component Value Date    BGM 87 07/08/2021     HEPATIC:   Lab  Results   Component Value Date    ALBUMIN 2.0 (L) 05/20/2024    PROTTOTAL 4.2 (L) 05/16/2024    ALT 9 05/16/2024    AST 13 05/16/2024    ALKPHOS 74 05/16/2024    BILITOTAL 0.2 05/16/2024     OTHER:   Lab Results   Component Value Date    PH 7.19 (LL) 03/31/2024    LACT 0.8 04/04/2024    A1C 5.2 01/08/2024    SONIA 7.7 (L) 05/20/2024    PHOS 4.9 (H) 05/20/2024    MAG 1.8 05/14/2024    LIPASE 132 08/10/2021    AMYLASE 46 08/14/2017    TSH 2.53 04/10/2024    T4 1.19 03/28/2017    CRP <2.9 09/18/2014    SED 39 (H) 11/13/2023       Anesthesia Plan    ASA Status:  3    NPO Status:  NPO Appropriate    Anesthesia Type: General.     - Airway: LMA   Induction: Propofol, Intravenous.   Maintenance: Balanced.        Consents    Anesthesia Plan(s) and associated risks, benefits, and realistic alternatives discussed. Questions answered and patient/representative(s) expressed understanding.     - Discussed:     - Discussed with:  Patient            Postoperative Care    Pain management: Multi-modal analgesia.   PONV prophylaxis: Ondansetron (or other 5HT-3), Dexamethasone or Solumedrol     Comments:               Leigh Perdomo MD, MD    I have reviewed the pertinent notes and labs in the chart from the past 30 days and (re)examined the patient.  Any updates or changes from those notes are reflected in this note.

## 2024-05-20 NOTE — CONSULTS
[facility] Nurse Inpatient Assessment     Consulted for: coccyx    Summary: POA - healed PI to esme, [facility] signing off    Patient History (according to provider note(s):      63 year old female with complex medical history which include anxiety, charcoaled Breonna tooth disease, GERD, hypertension, hyperlipidemia, lupus, severe peripheral artery disease, GERD, supraventricular tachycardia, asthma, was recently admitted in the hospital from 3/29/2024 to 4/22/2024 with right ischemic limb severe peripheral artery disease, ultimately require above-knee amputation the right side, course complicated by acute renal failure, rhabdomyolysis, sepsis, acute blood loss anemia, delirium, diarrhea and retroperitoneal hematoma will eventually discharged to acute rehab now coming from acute rehab because of wound dehiscence, necrosis mild erythema that needs debridement.     Assessment:      Areas visualized during today's visit: Focused: and Sacrum/coccyx    Wound location: coccyx (present on admission)      Last photo: 5/20  Wound due to: Pressure Injury    Wound history/plan of care: Patient known to [facility] from previous admissions, skin is re-epithelialized at this point, but this patient is at high risk for her wound to reopen. Educated patient on pressure injury prevention and she verbalized understanding.    Wound base: 100 % epidermis     Palpation of the wound bed: bone palpable through the skin     Drainage: none     Description of drainage: none     Measurements (length x width x depth, in cm): 3  x 3 cm area of blanchable redness with a thin line of blanchable redness extending down into the gluteal crease     Tunneling: N/A     Undermining: N/A  Periwound skin: Intact and Erythema- blanchable      Color: red      Temperature: normal   Odor: none  Pain: denies , none  Pain interventions prior to dressing change: N/A  Treatment goal: Maintain (prevention of deterioration) and  "Protection  STATUS: initial assessment  Supplies ordered: gathered, at bedside, supplies stored on unit, discussed with RN, and discussed with patient       Treatment Plan:     Coccyx/Sacrum - newly healed area: Apply Sacral Mepilex every other day and prn if soiled or damaged     Pressure Injury Prevention (PIP) Plan:  If patient is declining pressure injury prevention interventions: Explore reason why and address patient's concerns, Educate on pressure injury risk and prevention intervention(s), If patient is still declining, document \"informed refusal\" , and Ensure Care team is aware ( provider, charge nurse, etc)  Mattress: Follow bed algorithm, reassess daily and order specialty mattress, if indicated.  HOB: Maintain at or below 30 degrees, unless contraindicated  Repositioning in bed: Every 1-2 hours , Left/right positioning; avoid supine, and Raise foot of bed prior to raising head of bed, to reduce patient sliding down (shear)  Heels: Keep elevated off mattress and Pillows under calves  Protective Dressing: Sacral Mepilex for prevention (#[id number]),  especially for the agitated patient   Positioning Equipment:None  Chair positioning: Repositions self: patient to shift weight every 15 minutes, Assist patient to reposition hourly, and Do NOT use a donut for sitting (this increases pressure to smaller area and creates a higher potential for injury)    If patient has a buttock pressure injury, or high risk for PI use chair cushion or SPS.  Moisture Management: Perineal cleansing /protection: Follow Incontinence Protocol, Avoid brief in bed, Clean and dry skin folds with bathing , and Moisturize dry skin  Under Devices: Inspect skin under all medical devices during skin inspection , Ensure tubes are stabilized without tension, and Ensure patient is not lying on medical devices or equipment when repositioned  Ask provider to discontinue device when no longer needed.     Orders: Written    RECOMMEND PRIMARY TEAM " ORDER: None, at this time  Education provided: importance of repositioning, plan of care, wound progress, and Off-loading pressure  Discussed plan of care with: Patient and Nurse  WOC nurse follow-up plan: signing off  Notify WOC if wound(s) deteriorate.  Nursing to notify the Provider(s) and re-consult the WOC Nurse if new skin concern.    DATA:     Current support surface: Standard  Standard Isoflex gel  Containment of urine/stool: Incontinent pad in bed  BMI: Body mass index is 23.59 kg/m .   Active diet order: Orders Placed This Encounter      Renal Diet (non-dialysis)      NPO per Anesthesia Guidelines for Procedure/Surgery Except for: Meds     Output: I/O last 3 completed shifts:  In: 600 [P.O.:600]  Out: 1000 [Urine:1000]     Labs:   Recent Labs   Lab 05/20/24  0820   ALBUMIN 2.0*   HGB 10.3*   WBC 4.2     Pressure injury risk assessment:   Sensory Perception: 3-->slightly limited  Moisture: 3-->occasionally moist  Activity: 3-->walks occasionally  Mobility: 3-->slightly limited  Nutrition: 3-->adequate  Friction and Shear: 3-->no apparent problem  Cesar Score: 18    Marleen Santiago RN CWCN  Pager no longer is use, please contact through [email or web address] group: [facility] Nurse [place]

## 2024-05-20 NOTE — PROGRESS NOTES
[facility]    Medicine Progress Note - Hospitalist Service    Date of Admission:  5/15/2024    Assessment & Plan   Shirley Hendricks is a 63 year old female with complex medical history which includes anxiety, Charcot- Breonna Tooth disease, GERD, hypertension, hyperlipidemia, lupus, severe peripheral artery disease, GERD, supraventricular tachycardia, asthma, who was recently admitted in the hospital from 3/29/2024 to 4/22/2024 with right ischemic limb, severe peripheral artery disease, ultimately underwent above-knee amputation on the right side, course complicated by acute renal failure, rhabdomyolysis, sepsis, acute blood loss anemia, delirium, diarrhea and retroperitoneal hematoma. She was eventually discharged to acute rehab, now presenting because of wound dehiscence and necrosis that needs debridement. Underwent debridement of the stump on 5/16, cultures thus far negative    R AKA c/b wound dehiscence  No evidence of a bacterial SSTI of the wound at this time. Wound was apparently necrotic but not overtly infected. Surgical cultures are negative thus far and patient doing well, continues to remain off antibiotics. Wound vac is in place, patient notes pain deloris when the stump is being evaluated.   Staph capitis from surgical culture is growing. This is a coag negative staph which has low pathogenicity, often a contaminant and unlikely to have caused wound dehiscence.  if no other organisms grow, prior hospitalist states would still opt for no antibiotics at this point.   Planned for repeat washout on 5/20.  Resumed plavix and crestor. Holding xarelto.  Defer resumption of Xarelto to surgeons.    Acute renal failure  Occurred during recent hospital course likely 2/2 rhabdomyolysis and ischemic injury. Has been dialysis depdent on Tuesday Thursday, Saturday. However, has been having increased urination and possible that she is having at least some renal recovry.  See nephrology note  5/20/2024 making urine.  CR serially improved.  Hold dialysis tomorrow.  BMP in AM.    Anemia of chronic kidney disease  Patient has history of acute blood loss anemia, with retroperitoneal hematoma and surgeries.  Hemoglobin stable around 10 at this time.  No active bleeding at this time  -Recheck hemoglobin in a.m. after surgical washout.     Hypertension  Hyperlipidemia  History of coronary artery disease  No active chest pain at this time, blood pressure slightly on the higher side.  She is on amlodipine 10 mg daily, Coreg 6.25 mg twice daily, as needed hydralazine  and Bumex 4 mg 3-4 times a week.  All meds continued.  Monitor.        Possible celiac disease  History of loose stools  Patient is noncompliant with gluten-free diet.  Mild abdominal tenderness on exam.  At some point need to follow-up with the GI and counseling.     Asthma  GERD  No acute exacerbation of asthma, continue PTA Breo Ellipta   Continue PTA Pepcid 20 mg daily.     History of diabetes mellitus type 2  Last hemoglobin A1c was 5.2  Her Jardiance was discontinued.  She has had low blood sugars going down to 50s/60s range.  Placed on hypoglycemia protocol.  Currently on diet control     History of B-cell lymphoma  Followed by [place] [facility]  Continue outpatient surveillance and follow-up with oncology.     Hyponatremia  This is likely secondary to renal failure.  Nephrology to follow,  ultrafiltrate with dialysis.       Diet: Renal Diet (non-dialysis)  NPO per Anesthesia Guidelines for Procedure/Surgery Except for: Meds    DVT Prophylaxis: Heparin SQ  Alvarez Catheter: Not present  Lines: PRESENT      CVC Double Lumen Right Internal jugular Tunneled-Site Assessment: WDL      Cardiac Monitoring: None  Code Status: Full Code      Clinically Significant Risk Factors              # Hypoalbuminemia: Lowest albumin = 1.8 g/dL at 5/18/2024  7:25 AM, will monitor as appropriate     # Hypertension: Noted on problem list              #  Financial/Environmental Concerns:           Disposition Plan     Medically Ready for Discharge: Anticipated in 2-4 Days       Bethanie Staley MD  Hospitalist Service  [facility]  Securely message with [employer] (more info)  Text page via [employer] Paging/Directory     Total time 50 minutes for today 5/20/2024 :   time consisted of the following, assuming Patient care with review of records including labs, imaging results, medications, interdisciplinary notes; examination of Patient;  and completing documentation and orders.  Care Management included counseling/discussion with Patient regarding current condition including wound and Coordination of Care time with Nursing, WOC RN  and Specialists, [facility] regarding care plan, management and surveillance.   ______________________________________________________________________    Interval History   Assumed care.  Denies pain.  Afebrile.  Discussed with [facility] nurse, no changes.  No drainage.    Physical Exam   Vital Signs: Temp: 98.8  F (37.1  C) Temp src: Oral BP: (!) 159/81 Pulse: 66   Resp: 16 SpO2: 91 % O2 Device: None (Room air)    Weight: 124 lbs 12.49 oz    General Appearance: NAD, awake, calm, cooperative  Respiratory: Respirations nonlabored room air  GI: Soft, nontender.  Neuro.  Gross motor tested, nonfocal,  Psych oriented, affect calm   MSK 1+ pitting edema left leg, right stump with wound vac       Data     I have personally reviewed the following data over the past 24 hrs:    4.2  \   10.3 (L)   / 194     133 (L) 98 24.0 (H) /  74   4.6 26 2.22 (H) \     ALT: N/A AST: N/A AP: N/A TBILI: N/A   ALB: 2.0 (L) TOT PROTEIN: N/A LIPASE: N/A       Imaging results reviewed over the past 24 hrs:

## 2024-05-20 NOTE — PROGRESS NOTES
Nephrology Progress Note  05/20/2024         Assessment & Recommendations:   Shirley Hendricks is a 63 year old female who was admitted on 5/15/2024.      1) Dialysis dependent LIMA: Reports making more urine.  Urine output measurement is inaccurate.  Predialysis BUN is quite low.    Urine output improving.  Rate of rise of creatinine is slow. ?  Early recovery.  Hold dialysis tomorrow and monitor.  -Accurate I's/O is much as possible.  Daily renal panel.     2) RLE AKA wound dehiscence and necrosis: Status post I&D     3)Anemia: HGB up to 10.3 .  Mircera as outpatient     4) HTN: Continue amlodipine, carvedilol, Bumex on nondialysis days.        Nereida Vaz MD  [facility] - Nephrology   [phone]      Interval History :   Seen / examined on dialysis  No acute issues overnight  Making good amount of urine.  More than 1 L.  Slow rising solids.  Creatinine increased from 1.9-2.2.  Electrolytes are okay.  Sodium better at 133.          Physical Exam:   I/O last 3 completed shifts:  In: 600 [P.O.:600]  Out: 1000 [Urine:1000]  BP (!) 159/81 (BP Location: Left arm)   Pulse 66   Temp 98.8  F (37.1  C) (Oral)   Resp 16   Wt 56.6 kg (124 lb 12.5 oz)   LMP  (LMP Unknown)   SpO2 91%   BMI 23.59 kg/m      GENERAL APPEARANCE: alert and no distress  EYES:  no scleral icterus, pupils equal  PULM: lungs clear to auscultation, equal air movement, no cyanosis or clubbing  CV: regular rhythm, normal rate, no rub     -JVP -     -edema -.Rt BKA   GI: soft, non tender,   NEURO: mentation intact and speech normal, no asterixis   Access Rt internal jugular TDC     Labs:   All labs reviewed by me  Electrolytes/Renal -   Recent Labs   Lab Test 05/20/24  0820 05/19/24  0744 05/18/24  0725 05/15/24  0542 05/14/24  0805 05/13/24  0706 05/12/24  1309   * 132* 129*   < > 128* 129*  --    POTASSIUM 4.6 4.2 5.2   < > 4.4 4.3  --    CHLORIDE 98 99 98   < > 96* 96*  --    CO2 26 27 20*   < > 25 25  --    BUN 24.0* 17.2  26.3*   < > 22.5 14.2  --    CR 2.22* 1.95* 2.65*   < > 3.18* 2.64*  --    GLC 74 71 73  73   < > 90 68*  --    SONIA 7.7* 7.5* 7.3*   < > 7.5* 7.4*  --    MAG  --   --   --   --  1.8 1.9 2.1   PHOS 4.9* 3.6 4.3   < > 3.7 2.6  --     < > = values in this interval not displayed.       CBC -   Recent Labs   Lab Test 05/20/24  0820 05/17/24  1137 05/16/24  0832   WBC 4.2 5.8 3.4*   HGB 10.3* 10.2* 10.0*    207 173       LFTs -   Recent Labs   Lab Test 05/20/24  0820 05/19/24  0744 05/18/24  0725 05/17/24  1137 05/16/24  0832 05/15/24  0542 05/14/24  0805 05/08/24  0546 05/07/24  0553   ALKPHOS  --   --   --   --  74  --  82  --  90   BILITOTAL  --   --   --   --  0.2  --  0.3  --  0.5   ALT  --   --   --   --  9  --  7  --  12   AST  --   --   --   --  13  --  16  --  14   PROTTOTAL  --   --   --   --  4.2*  --  4.3*  --  4.1*   ALBUMIN 2.0* 1.9* 1.8*   < > 1.9*   < > 2.0*   < > 1.9*    < > = values in this interval not displayed.       Iron Panel -   Recent Labs   Lab Test 04/10/24  0559 04/09/24  1817 01/08/24  1542 12/11/23  0550   IRON 22*  --  71 122   IRONSAT 18  --  17 32   BRYN  --  609* 25 23             Current Medications:  Current Facility-Administered Medications   Medication Dose Route Frequency Provider Last Rate Last Admin    - MEDICATION INSTRUCTIONS for Dialysis Patients -   Does not apply See Admin Instructions Levy Byers MD        amLODIPine (NORVASC) tablet 10 mg  10 mg Oral Daily Manuel Robledo MD   10 mg at 05/20/24 0901    bumetanide (BUMEX) tablet 4 mg  4 mg Oral Once per day on Sunday Monday Wednesday Friday Manuel Robledo MD   4 mg at 05/20/24 0904    carvedilol (COREG) tablet 6.25 mg  6.25 mg Oral BID w/meals OvianClarisse MD   6.25 mg at 05/20/24 0901    cetirizine (zyrTEC) tablet 5 mg  5 mg Oral Daily Manuel Robledo MD   5 mg at 05/20/24 0903    clopidogrel (PLAVIX) tablet 75 mg  75 mg Oral Daily Gala Lo MD   75 mg at 05/20/24 0901     famotidine (PEPCID) tablet 20 mg  20 mg Oral Q48H Gala Lo MD   20 mg at 05/18/24 1811    Or    famotidine (PEPCID) injection 20 mg  20 mg Intravenous Q48H Gala Lo MD        fluticasone-vilanterol (BREO ELLIPTA) 200-25 MCG/ACT inhaler 1 puff  1 puff Inhalation Daily Manuel Robledo MD   1 puff at 05/19/24 0848    gabapentin (NEURONTIN) capsule 200 mg  200 mg Oral Once per day on Tuesday Thursday Saturday Manuel Robledo MD   200 mg at 05/18/24 2235    lactobacillus rhamnosus (GG) (CULTURELL) capsule 1 capsule  1 capsule Oral BID Manuel Robledo MD   1 capsule at 05/20/24 0901    miconazole (MICATIN) 2 % powder   Topical BID Manuel Robledo MD   Given at 05/19/24 0849    multivitamin RENAL (TRIPHROCAPS) capsule 1 capsule  1 capsule Oral Daily Manuel Robledo MD   1 capsule at 05/20/24 1152    nicotine (NICODERM CQ) 14 MG/24HR 24 hr patch 1 patch  1 patch Transdermal Daily Manuel Robledo MD   1 patch at 05/20/24 0901    polyethylene glycol (MIRALAX) Packet 17 g  17 g Oral Daily Gala Lo MD   17 g at 05/19/24 1048    rosuvastatin (CRESTOR) tablet 10 mg  10 mg Oral At Bedtime Manuel Robledo MD   10 mg at 05/19/24 2125    [Held by provider] senna-docusate (SENOKOT-S/PERICOLACE) 8.6-50 MG per tablet 1-2 tablet  1-2 tablet Oral BID Manuel Robledo MD        silver sulfADIAZINE (SILVADENE) 1 % cream   Topical Daily Manuel Robledo MD        sodium chloride (PF) 0.9% PF flush 3 mL  3 mL Intracatheter Q8H Gala Lo MD   3 mL at 05/18/24 1639     Current Facility-Administered Medications   Medication Dose Route Frequency Provider Last Rate Last Admin     Nereida Vaz MD

## 2024-05-20 NOTE — ANESTHESIA PROCEDURE NOTES
Airway       Patient location during procedure: OR  Staff -        Anesthesiologist:  Jenae Braga       CRNA: Moriah Oro APRN CRNA       Performed By: CRNA  Consent for Airway        Urgency: elective  Indications and Patient Condition       Indications for airway management: lydia-procedural       Induction type:intravenous       Mask difficulty assessment: 0 - not attempted    Final Airway Details       Final airway type: supraglottic airway    Supraglottic Airway Details        Type: LMA       Brand: I-Gel       LMA size: 4    Post intubation assessment        Placement verified by: capnometry and chest rise        Number of attempts at approach: 1       Number of other approaches attempted: 0       Secured with: tape       Ease of procedure: easy       Dentition: Unchanged

## 2024-05-21 ENCOUNTER — APPOINTMENT (OUTPATIENT)
Dept: OCCUPATIONAL THERAPY | Facility: CLINIC | Age: 64
DRG: 464 | End: 2024-05-21
Attending: INTERNAL MEDICINE
Payer: COMMERCIAL

## 2024-05-21 LAB
ALBUMIN SERPL BCG-MCNC: 2 G/DL (ref 3.5–5.2)
ANION GAP SERPL CALCULATED.3IONS-SCNC: 10 MMOL/L (ref 7–15)
BACTERIA TISS BX CULT: ABNORMAL
BASOPHILS # BLD AUTO: 0 10E3/UL (ref 0–0.2)
BASOPHILS NFR BLD AUTO: 0 %
BUN SERPL-MCNC: 33.1 MG/DL (ref 8–23)
CALCIUM SERPL-MCNC: 7.5 MG/DL (ref 8.8–10.2)
CHLORIDE SERPL-SCNC: 95 MMOL/L (ref 98–107)
CREAT SERPL-MCNC: 2.54 MG/DL (ref 0.51–0.95)
DEPRECATED HCO3 PLAS-SCNC: 24 MMOL/L (ref 22–29)
EGFRCR SERPLBLD CKD-EPI 2021: 20 ML/MIN/1.73M2
EOSINOPHIL # BLD AUTO: 0 10E3/UL (ref 0–0.7)
EOSINOPHIL NFR BLD AUTO: 0 %
ERYTHROCYTE [DISTWIDTH] IN BLOOD BY AUTOMATED COUNT: 17.2 % (ref 10–15)
GLUCOSE SERPL-MCNC: 123 MG/DL (ref 70–99)
HCT VFR BLD AUTO: 29.3 % (ref 35–47)
HGB BLD-MCNC: 9.3 G/DL (ref 11.7–15.7)
IMM GRANULOCYTES # BLD: 0 10E3/UL
IMM GRANULOCYTES NFR BLD: 0 %
LYMPHOCYTES # BLD AUTO: 1.3 10E3/UL (ref 0.8–5.3)
LYMPHOCYTES NFR BLD AUTO: 33 %
MCH RBC QN AUTO: 30.3 PG (ref 26.5–33)
MCHC RBC AUTO-ENTMCNC: 31.7 G/DL (ref 31.5–36.5)
MCV RBC AUTO: 95 FL (ref 78–100)
MONOCYTES # BLD AUTO: 0.4 10E3/UL (ref 0–1.3)
MONOCYTES NFR BLD AUTO: 9 %
NEUTROPHILS # BLD AUTO: 2.2 10E3/UL (ref 1.6–8.3)
NEUTROPHILS NFR BLD AUTO: 58 %
NRBC # BLD AUTO: 0 10E3/UL
NRBC BLD AUTO-RTO: 0 /100
PHOSPHATE SERPL-MCNC: 6.1 MG/DL (ref 2.5–4.5)
PLATELET # BLD AUTO: 181 10E3/UL (ref 150–450)
POTASSIUM SERPL-SCNC: 4.8 MMOL/L (ref 3.4–5.3)
RBC # BLD AUTO: 3.07 10E6/UL (ref 3.8–5.2)
SODIUM SERPL-SCNC: 129 MMOL/L (ref 135–145)
WBC # BLD AUTO: 3.9 10E3/UL (ref 4–11)

## 2024-05-21 PROCEDURE — 99232 SBSQ HOSP IP/OBS MODERATE 35: CPT | Performed by: HOSPITALIST

## 2024-05-21 PROCEDURE — 85025 COMPLETE CBC W/AUTO DIFF WBC: CPT

## 2024-05-21 PROCEDURE — 250N000011 HC RX IP 250 OP 636

## 2024-05-21 PROCEDURE — 99232 SBSQ HOSP IP/OBS MODERATE 35: CPT | Performed by: STUDENT IN AN ORGANIZED HEALTH CARE EDUCATION/TRAINING PROGRAM

## 2024-05-21 PROCEDURE — 80069 RENAL FUNCTION PANEL: CPT | Performed by: STUDENT IN AN ORGANIZED HEALTH CARE EDUCATION/TRAINING PROGRAM

## 2024-05-21 PROCEDURE — 250N000013 HC RX MED GY IP 250 OP 250 PS 637

## 2024-05-21 PROCEDURE — 250N000013 HC RX MED GY IP 250 OP 250 PS 637: Performed by: INTERNAL MEDICINE

## 2024-05-21 PROCEDURE — 120N000001 HC R&B MED SURG/OB

## 2024-05-21 PROCEDURE — 36415 COLL VENOUS BLD VENIPUNCTURE: CPT

## 2024-05-21 PROCEDURE — 250N000013 HC RX MED GY IP 250 OP 250 PS 637: Performed by: HOSPITALIST

## 2024-05-21 PROCEDURE — 97535 SELF CARE MNGMENT TRAINING: CPT | Mod: GO

## 2024-05-21 PROCEDURE — 99232 SBSQ HOSP IP/OBS MODERATE 35: CPT | Mod: 24 | Performed by: SURGERY

## 2024-05-21 RX ADMIN — POLYETHYLENE GLYCOL 3350 17 G: 17 POWDER, FOR SOLUTION ORAL at 08:05

## 2024-05-21 RX ADMIN — NICOTINE 1 PATCH: 14 PATCH, EXTENDED RELEASE TRANSDERMAL at 08:05

## 2024-05-21 RX ADMIN — CARVEDILOL 6.25 MG: 3.12 TABLET, FILM COATED ORAL at 17:06

## 2024-05-21 RX ADMIN — Medication 1 CAPSULE: at 12:52

## 2024-05-21 RX ADMIN — CETIRIZINE HYDROCHLORIDE 5 MG: 5 TABLET ORAL at 08:05

## 2024-05-21 RX ADMIN — OXYCODONE HYDROCHLORIDE 5 MG: 5 TABLET ORAL at 21:06

## 2024-05-21 RX ADMIN — Medication 1 CAPSULE: at 08:05

## 2024-05-21 RX ADMIN — OXYCODONE HYDROCHLORIDE 5 MG: 5 TABLET ORAL at 14:03

## 2024-05-21 RX ADMIN — CARVEDILOL 6.25 MG: 3.12 TABLET, FILM COATED ORAL at 08:05

## 2024-05-21 RX ADMIN — HEPARIN SODIUM 5000 UNITS: 5000 INJECTION, SOLUTION INTRAVENOUS; SUBCUTANEOUS at 21:06

## 2024-05-21 RX ADMIN — CLOPIDOGREL BISULFATE 75 MG: 75 TABLET ORAL at 08:05

## 2024-05-21 RX ADMIN — ROSUVASTATIN CALCIUM 10 MG: 10 TABLET, FILM COATED ORAL at 21:06

## 2024-05-21 RX ADMIN — AMLODIPINE BESYLATE 10 MG: 10 TABLET ORAL at 08:05

## 2024-05-21 RX ADMIN — CLINDAMYCIN PHOSPHATE 900 MG: 900 INJECTION, SOLUTION INTRAVENOUS at 08:08

## 2024-05-21 RX ADMIN — FLUTICASONE FUROATE AND VILANTEROL TRIFENATATE 1 PUFF: 200; 25 POWDER RESPIRATORY (INHALATION) at 08:08

## 2024-05-21 RX ADMIN — Medication 1 CAPSULE: at 21:06

## 2024-05-21 RX ADMIN — HEPARIN SODIUM 5000 UNITS: 5000 INJECTION, SOLUTION INTRAVENOUS; SUBCUTANEOUS at 12:52

## 2024-05-21 RX ADMIN — GABAPENTIN 200 MG: 100 CAPSULE ORAL at 21:06

## 2024-05-21 ASSESSMENT — ACTIVITIES OF DAILY LIVING (ADL)
ADLS_ACUITY_SCORE: 45

## 2024-05-21 NOTE — OP NOTE
Preoperative diagnosis: Right above-knee amputation site wound breakdown.    Postoperative diagnosis: Same.    Procedure:  1.  Sharp excisional debridement of right aka stump wound.   2. Application of wound vac.     Surgeon: José Luis Hernandez M.D.   Assistant: Gala Lo M.D.     Anesthesia: General  Estimated blood loss: 20 mls  Specimens: None    Indication for procedure: Shirley is a 63 year old female with a previous right above the knee amputation site with breakdown of the skin on the mike-lateral aspect of the stump. She had recently undergone debridement on 05/16/2024. She is here for a second debridement.     Procedure details: Patient was identified and then taken to the operative room and placed in supine position.  General anesthesia was induced.  Right above-knee amputation stump wound VAC was removed.  The stump was then prepped with Betadine solution.    We started with sharp excisional debridement of the previously left behind subcutaneous tissue.  This indeed was not viable and not bleeding.  The entire surface of the anterior and lateral aspect of the stump was debrided first sharply with a #15 blade and then with a curette.  We identified good bleeding margins on top of the fascia.  There was another wound more inner and medially.  This was also debrided.  The lateral wound measures 16 cm x 8 cm x 5 mm depth in the anterior wound measured 2 cm x 4 cm x 1 cm depth.  These were also then thoroughly scrubbed with the bristled end of scrub brush.  Adequate hemostasis was noted.  Wound VAC sponge was applied and activated with good seal.    Counts of instruments, sponges and needle was noted to be correct.    Patient was awakened and extubated and taken to the recovery room in stable condition.

## 2024-05-21 NOTE — PLAN OF CARE
Diagnosis: Right AKA I&D 5/16 & 5/20  POD#:1  Mental Status:A&O x 4  Activity/dangle: A2 slider board or T/R bed. BSC  Diet: renal and gluten free   Pain: oxycodone available  Alvarez/Voiding: purewick, HD held today  Tele/Restraints/Iso: NA  02/LDA: SL, dialysis shunt right chest  D/C Date: TBD  Other Info: wound vac to Right stump, change at bedside tomorrow

## 2024-05-21 NOTE — PROGRESS NOTES
Renal Medicine Progress Note            Assessment/Plan:     Assessment: Shirley Hendricks is a 63 year old female who was admitted on 5/15/2024.     LIMA   Multifactorial LIMA due to IV contrast, occluded bypass graft, ischemia, sepsis. Was dialysis dependent, however now with signs of renal recovery with 1L urine/day, less rise in Cr. Will continue to monitor for renal recovery and assess if further HD is needed.     RLE AKA wound dehiscence and necrosis   S/p I&D, vascular surgery managing.     Anemia   Gets mircera as outpatient.     HTN   Continue amlodipine and carvedilol. Holding bumex for today, appears slightly hypovolemic. Encouraged PO intake.     Plan/Recs:  1) having some renal recovery, daily assessment of Cr and UOP  2) holding bumex today, appears euvolemic   3) please keep tunneled cath in place until it is clear whether she will continue to require dialysis or not     Reviewed notes from Dr. Staley (hospitalist), Dr. Lozano and Dr. Hernandez (vascular surgery).       Kiana Joseph MD   [facility]  Office: [phone]          Interval History:        I&D went well yetserday   Denies pain or other complaints   Feels hungry, denies n/v/dygeusia   Denies foggyness or feeling confused   Overall feeling quite good   UOP picking up, 700 ml yesterday, 1L so far today   Denies SOB     Daughter updated via phone          Medications and Allergies:     Current Facility-Administered Medications   Medication Dose Route Frequency Provider Last Rate Last Admin    - MEDICATION INSTRUCTIONS for Dialysis Patients -   Does not apply See Admin Instructions Levy Byers MD        amLODIPine (NORVASC) tablet 10 mg  10 mg Oral Daily Manuel Robledo MD   10 mg at 05/21/24 0805    bumetanide (BUMEX) tablet 4 mg  4 mg Oral Once per day on Sunday Monday Wednesday Friday Manuel Robledo MD   4 mg at 05/20/24 0904    carvedilol (COREG) tablet 6.25 mg  6.25 mg Oral BID w/meals Clarisse Alfaro  MD Robyn   6.25 mg at 05/21/24 0805    cetirizine (zyrTEC) tablet 5 mg  5 mg Oral Daily Manuel Robledo MD   5 mg at 05/21/24 0805    clopidogrel (PLAVIX) tablet 75 mg  75 mg Oral Daily Gala Lo MD   75 mg at 05/21/24 0805    famotidine (PEPCID) tablet 20 mg  20 mg Oral Q48H Gala Lo MD   20 mg at 05/18/24 1811    Or    famotidine (PEPCID) injection 20 mg  20 mg Intravenous Q48H Gala Lo MD        fluticasone-vilanterol (BREO ELLIPTA) 200-25 MCG/ACT inhaler 1 puff  1 puff Inhalation Daily Manuel Robledo MD   1 puff at 05/21/24 0808    gabapentin (NEURONTIN) capsule 200 mg  200 mg Oral Once per day on Tuesday Thursday Saturday Manuel Robledo MD   200 mg at 05/18/24 2235    heparin ANTICOAGULANT injection 5,000 Units  5,000 Units Subcutaneous Q8H Gala Lo MD   5,000 Units at 05/21/24 1252    lactobacillus rhamnosus (GG) (CULTURELL) capsule 1 capsule  1 capsule Oral BID Manuel Robledo MD   1 capsule at 05/21/24 0805    miconazole (MICATIN) 2 % powder   Topical BID Manuel Robledo MD   Given at 05/19/24 0849    multivitamin RENAL (TRIPHROCAPS) capsule 1 capsule  1 capsule Oral Daily Manuel Robledo MD   1 capsule at 05/21/24 1252    nicotine (NICODERM CQ) 14 MG/24HR 24 hr patch 1 patch  1 patch Transdermal Daily Manuel Robledo MD   1 patch at 05/21/24 0805    polyethylene glycol (MIRALAX) Packet 17 g  17 g Oral Daily Gala Lo MD   17 g at 05/21/24 0805    rosuvastatin (CRESTOR) tablet 10 mg  10 mg Oral At Bedtime Manuel Robledo MD   10 mg at 05/20/24 2102    [Held by provider] senna-docusate (SENOKOT-S/PERICOLACE) 8.6-50 MG per tablet 1-2 tablet  1-2 tablet Oral BID Manuel Robledo MD        silver sulfADIAZINE (SILVADENE) 1 % cream   Topical Daily Manuel Robledo MD        sodium chloride (PF) 0.9% PF flush 3 mL  3 mL Intracatheter Q8H Gala Lo MD   3 mL at 05/21/24 0806        Allergies   Allergen  Reactions    Pantoprazole      Protonix caused diffuse edema    Chantix [Varenicline]      Terrible dreams    Contrast Dye Swelling     Patient reports facial and throat swelling with prior CT contrast.    Penicillins Itching and Rash            Physical Exam:   Vitals were reviewed  BP (!) 163/66   Pulse 65   Temp 98.6  F (37  C) (Oral)   Resp 16   Wt 56.4 kg (124 lb 5.4 oz)   LMP  (LMP Unknown)   SpO2 96%   BMI 23.51 kg/m      Wt Readings from Last 3 Encounters:   05/21/24 56.4 kg (124 lb 5.4 oz)   05/14/24 57 kg (125 lb 10.6 oz)   04/22/24 50.5 kg (111 lb 5.3 oz)       Intake/Output Summary (Last 24 hours) at 5/21/2024 1448  Last data filed at 5/21/2024 1353  Gross per 24 hour   Intake 475 ml   Output 1070 ml   Net -595 ml       GENERAL APPEARANCE: NAD, well appearing  HEENT: normocephalic, MMM  RESP: clear  CV: RRR  EXTREMITIES/SKIN: 1+ edema in L foot   NEURO:  alert, oriented, normal speech            Data:     BMP  Recent Labs   Lab 05/21/24  0725 05/20/24  0820 05/19/24  0744 05/18/24  0725   * 133* 132* 129*   POTASSIUM 4.8 4.6 4.2 5.2   CHLORIDE 95* 98 99 98   SONIA 7.5* 7.7* 7.5* 7.3*   CO2 24 26 27 20*   BUN 33.1* 24.0* 17.2 26.3*   CR 2.54* 2.22* 1.95* 2.65*   * 74 71 73  73     CBC  Recent Labs   Lab 05/21/24  0725 05/20/24  0820 05/17/24  1137 05/16/24  0832   WBC 3.9* 4.2 5.8 3.4*   HGB 9.3* 10.3* 10.2* 10.0*   HCT 29.3* 33.7* 33.3* 32.2*   MCV 95 99 101* 99    194 207 173     Lab Results   Component Value Date    AST 13 05/16/2024    ALT 9 05/16/2024    ALKPHOS 74 05/16/2024    BILITOTAL 0.2 05/16/2024    BILICONJ 0.0 01/23/2004     Lab Results   Component Value Date    INR 1.31 (H) 04/11/2024     Color Urine (no units)   Date Value   05/03/2024 Orange (A)   02/04/2019 Yellow     Appearance Urine (no units)   Date Value   05/03/2024 Cloudy (A)   02/04/2019 Clear     Glucose Urine (mg/dL)   Date Value   05/03/2024 Negative   02/04/2019 Negative     Bilirubin Urine (no units)    Date Value   05/03/2024 Negative   02/04/2019 Negative     Ketones Urine (mg/dL)   Date Value   05/03/2024 Negative   02/04/2019 Negative     Specific Gravity Urine (no units)   Date Value   05/03/2024 1.018   02/04/2019 1.010     pH Urine   Date Value   05/03/2024 6.0   02/04/2019 6.0 pH     Protein Albumin Urine (mg/dL)   Date Value   05/03/2024 200 (A)   02/04/2019 Negative     Urobilinogen Urine (EU/dL)   Date Value   02/04/2019 0.2     Nitrite Urine (no units)   Date Value   05/03/2024 Negative   02/04/2019 Negative     Leukocyte Esterase Urine (no units)   Date Value   05/03/2024 Large (A)   02/04/2019 Negative         Attestation:  I have reviewed today's vital signs, notes, medications, labs and imaging.    Kiana oJseph MD  [facility] - [facility]  Office: [phone]

## 2024-05-21 NOTE — PROGRESS NOTES
[facility]    Medicine Progress Note - Hospitalist Service    Date of Admission:  5/15/2024    Assessment & Plan   Shirley Hendricks is a 63 year old female with complex medical history which includes anxiety, Charcot- Breonna Tooth disease, GERD, hypertension, hyperlipidemia, lupus, severe peripheral artery disease, GERD, supraventricular tachycardia, asthma, who was recently admitted in the hospital from 3/29/2024 to 4/22/2024 with right ischemic limb, severe peripheral artery disease, ultimately underwent above-knee amputation on the right side, course complicated by acute renal failure, rhabdomyolysis, sepsis, acute blood loss anemia, delirium, diarrhea and retroperitoneal hematoma. She was eventually discharged to acute rehab, now presenting because of wound dehiscence and necrosis that needs debridement. Underwent debridement of the stump on 5/16, cultures thus far negative    R AKA c/b wound dehiscence  S/p wound vac placement  S/p washout 5/20/24  No evidence of a bacterial SSTI of the wound at this time. Wound was apparently necrotic but not overtly infected. Surgical cultures are negative thus far and patient doing well, continues to remain off antibiotics. Wound vac is in place, patient notes pain deloris when the stump is being evaluated.   Staph capitis from surgical culture is growing. This is a coag negative staph which has low pathogenicity, often a contaminant and unlikely to have caused wound dehiscence.  if no other organisms grow, prior hospitalist states would still opt for no antibiotics at this point.   Planned for repeat washout on 5/20.  Resumed plavix and crestor. Holding SQH. .  Defer resumption of AC to surgeons.  5/21/2024.  See vascular surgery note 5/21/2024.  Planned wound VAC change tomorrow 5/22/2024.  Resume SQH q 8 hours.  CBC in AM    Acute renal failure  Occurred during recent hospital course likely 2/2 rhabdomyolysis and ischemic injury. Has been dialysis  depdent on Tuesday Thursday, Saturday. However, has been having increased urination and possible that she is having at least some renal recovry.  See nephrology note 5/20/2024 making urine.  Per nephrology hold dialysis 5/21/2024.  CR 5/21/2024 CR increased to 2.54, yesterday 2.22, continues to make urine.  Follow BMP in a.m., nephrology continues to follow.      Anemia of chronic kidney disease  Patient has history of acute blood loss anemia, with retroperitoneal hematoma and surgeries.  Hemoglobin stable around 10 at this time.  No active bleeding at this time  -Recheck hemoglobin in a.m     Hypertension  Hyperlipidemia  History of coronary artery disease  No active chest pain at this time, blood pressure slightly on the higher side.  She is on amlodipine 10 mg daily, Coreg 6.25 mg twice daily, as needed hydralazine  PTA Bumex 4 mg 3-4 times a week on hold.     Possible celiac disease  History of loose stools  Patient is noncompliant with gluten-free diet.  Mild abdominal tenderness on exam.  At some point need to follow-up with the GI and counseling.     Asthma  GERD  No acute exacerbation of asthma, continue PTA Breo Ellipta   Continue PTA Pepcid 20 mg daily.     History of diabetes mellitus type 2  Last hemoglobin A1c was 5.2  Her Jardiance was discontinued.  She has had low blood sugars going down to 50s/60s range.  Placed on hypoglycemia protocol.  Currently on diet control     History of B-cell lymphoma  Followed by [place] [facility]  Continue outpatient surveillance and follow-up with oncology.     Hyponatremia  This is likely secondary to renal failure.  Nephrology to follow,  ultrafiltrate with dialysis.       Diet: Combination Diet Gluten Free Diet; Renal Diet (dialysis)    DVT Prophylaxis: Heparin SQ  Alvarez Catheter: Not present  Lines: PRESENT      CVC Double Lumen Right Internal jugular Tunneled-Site Assessment: WDL      Cardiac Monitoring: None  Code Status: Full Code      Clinically Significant  Risk Factors         # Hyponatremia: Lowest Na = 129 mmol/L in last 2 days, will monitor as appropriate      # Hypoalbuminemia: Lowest albumin = 1.8 g/dL at 5/18/2024  7:25 AM, will monitor as appropriate     # Hypertension: Noted on problem list              # Financial/Environmental Concerns:           Disposition Plan     Medically Ready for Discharge: Anticipated in 2-4 Days       Bethanie Staley MD  Hospitalist Service  [facility]  Securely message with Vocera (more info)  Text page via [employer] Paging/Directory     I spent 35 minutes total time in management of care today 5/21/2024 reviewing labs, medications, interdisciplinary notes; and completing documentation of encounter and orders with Care Management including counseling/discussion withthe Patient regarding wounds; repositioning and Coordinating Care and plan with Nursing and Specialists, Vascular Surgery regarding management and surveillance, as above  ______________________________________________________________________    Interval History   Denies pain.  Afebrile.  Nursing notes no wound issues or wound VAC issues.    Physical Exam   Vital Signs: Temp: 98.6  F (37  C) Temp src: Oral BP: (!) 163/66 Pulse: 65   Resp: 16 SpO2: 96 % O2 Device: None (Room air) Oxygen Delivery: 2 LPM  Weight: 124 lbs 5.43 oz    General Appearance: NAD, more awake, calm, cooperative  Respiratory: Respirations nonlabored room air  GI: Soft, nontender.  Neuro.  Gross motor tested, nonfocal,  Psych oriented, affect calm   Skin, decubitus wound on buttock, dressed.  MSK 1+ pitting edema left leg, right stump with wound vac, dressed      Data     I have personally reviewed the following data over the past 24 hrs:    3.9 (L)  \   9.3 (L)   / 181     129 (L) 95 (L) 33.1 (H) /  123 (H)   4.8 24 2.54 (H) \     ALT: N/A AST: N/A AP: N/A TBILI: N/A   ALB: 2.0 (L) TOT PROTEIN: N/A LIPASE: N/A       Imaging results reviewed over the past 24 hrs:

## 2024-05-21 NOTE — PLAN OF CARE
Diagnosis: I&D R AKA with wound vac placement  POD#: 1  Mental Status: A&Ox4  Activity/dangle not OOB yet  Diet: regular  Pain: oxycodone  Alvarez/Voiding: lianck   02/LDA: BARBRA CHRISTENSEN  D/C Date: pending  Other Info: dialysis T,TH,S. Dressing CDI. R chest port. BM x1 loose. Wound vac.

## 2024-05-21 NOTE — PLAN OF CARE
Diagnosis: Right AKA I&D 5/16 & 5/20  POD#:1  Mental Status:A&O x 4  Activity/dangle up with lift/slide board  Diet: renal  Pain: oxycodone  Alvarez/Voiding: purewick, HD held today  Tele/Restraints/Iso: NA  02/LDA: SL, dialysis shunt right chest  D/C Date: ?  Other Info: wound vac to Right stump, change at bedside tomorrow.

## 2024-05-21 NOTE — PROGRESS NOTES
VASCULAR SURGERY    Doing very well this morning.  Wound VAC to right AKA working well  +3 right femoral and left in situ graft pulse    Excellent urine output yesterday.  SCr gradually improving    IMPRESSION: #1.  Doing well following right AKA wound debridement.  Plan wound VAC at time of discharge.  Will change at bedside tomorrow.     #2.  PAD.  Right graft has been removed which resulted in AKA.  Does have some stenosis to the profundus femoral artery with a patent aortobifemoral bypass graft and left femoral-popliteal in situ bypass graft.  Reluctant to recommend angiography at this time of the profunda stenosis due to renal issues and concerns about access problems with her multiple bypasses.  We do feel that she has enough blood supply for healing and this was discussed with her.     #3.  Bowel function has been good.  Problems with protein supplements.  Malnourished with albumin= 2.0.   Will work on high-protein diet which she has been able to tolerate.     #4.  LIMA.  This is improving.  No dialysis planned for today.  Hopefully dialysis can be discontinued and tunnel catheter can be removed.    #5.  Acute on chronic anemia.  On iron supplements.  Hgb= 9.3    #6.  Working on mobility.  Superficial sacral decubitus with appropriate wound care treatment.  Try to get up in chair much more frequently.       Chadwick Lozano MD

## 2024-05-21 NOTE — ANESTHESIA POSTPROCEDURE EVALUATION
Patient: Shirley Hendricks    Procedure: Procedure(s):  IRRIGATION AND DEBRIDMENT RIGHT LOWER EXTREMITY  AND PLACEMENT OF WOUND VAC       Anesthesia Type:  General    Note:  Disposition: Inpatient   Postop Pain Control: Uneventful            Sign Out: Well controlled pain   PONV: No   Neuro/Psych: Uneventful            Sign Out: Acceptable/Baseline neuro status   Airway/Respiratory: Uneventful            Sign Out: Acceptable/Baseline resp. status   CV/Hemodynamics: Uneventful            Sign Out: Acceptable CV status; No obvious hypovolemia; No obvious fluid overload   Other NRE: NONE   DID A NON-ROUTINE EVENT OCCUR?            Last vitals:  Vitals Value Taken Time   /74 05/20/24 1815   Temp 36.1  C (97  F) 05/20/24 1815   Pulse 60 05/20/24 1822   Resp 11 05/20/24 1822   SpO2 97 % 05/20/24 1823   Vitals shown include unfiled device data.    Electronically Signed By: Jenae Braga  May 20, 2024  7:31 PM

## 2024-05-22 LAB
ANION GAP SERPL CALCULATED.3IONS-SCNC: 12 MMOL/L (ref 7–15)
BUN SERPL-MCNC: 37.1 MG/DL (ref 8–23)
CALCIUM SERPL-MCNC: 7.5 MG/DL (ref 8.8–10.2)
CHLORIDE SERPL-SCNC: 93 MMOL/L (ref 98–107)
CREAT SERPL-MCNC: 2.51 MG/DL (ref 0.51–0.95)
DEPRECATED HCO3 PLAS-SCNC: 23 MMOL/L (ref 22–29)
EGFRCR SERPLBLD CKD-EPI 2021: 21 ML/MIN/1.73M2
GLUCOSE SERPL-MCNC: 85 MG/DL (ref 70–99)
POTASSIUM SERPL-SCNC: 4.9 MMOL/L (ref 3.4–5.3)
SODIUM SERPL-SCNC: 128 MMOL/L (ref 135–145)

## 2024-05-22 PROCEDURE — 250N000011 HC RX IP 250 OP 636

## 2024-05-22 PROCEDURE — 80048 BASIC METABOLIC PNL TOTAL CA: CPT | Performed by: STUDENT IN AN ORGANIZED HEALTH CARE EDUCATION/TRAINING PROGRAM

## 2024-05-22 PROCEDURE — 99232 SBSQ HOSP IP/OBS MODERATE 35: CPT | Performed by: INTERNAL MEDICINE

## 2024-05-22 PROCEDURE — 97606 NEG PRS WND THER DME>50 SQCM: CPT

## 2024-05-22 PROCEDURE — 250N000013 HC RX MED GY IP 250 OP 250 PS 637: Performed by: HOSPITALIST

## 2024-05-22 PROCEDURE — 250N000013 HC RX MED GY IP 250 OP 250 PS 637

## 2024-05-22 PROCEDURE — 250N000013 HC RX MED GY IP 250 OP 250 PS 637: Performed by: INTERNAL MEDICINE

## 2024-05-22 PROCEDURE — 120N000001 HC R&B MED SURG/OB

## 2024-05-22 PROCEDURE — 36415 COLL VENOUS BLD VENIPUNCTURE: CPT | Performed by: STUDENT IN AN ORGANIZED HEALTH CARE EDUCATION/TRAINING PROGRAM

## 2024-05-22 PROCEDURE — 99232 SBSQ HOSP IP/OBS MODERATE 35: CPT | Performed by: HOSPITALIST

## 2024-05-22 RX ADMIN — OXYCODONE HYDROCHLORIDE 5 MG: 5 TABLET ORAL at 10:01

## 2024-05-22 RX ADMIN — AMLODIPINE BESYLATE 10 MG: 10 TABLET ORAL at 09:14

## 2024-05-22 RX ADMIN — ROSUVASTATIN CALCIUM 10 MG: 10 TABLET, FILM COATED ORAL at 21:10

## 2024-05-22 RX ADMIN — CARVEDILOL 6.25 MG: 3.12 TABLET, FILM COATED ORAL at 18:13

## 2024-05-22 RX ADMIN — CARVEDILOL 6.25 MG: 3.12 TABLET, FILM COATED ORAL at 09:14

## 2024-05-22 RX ADMIN — Medication 1 CAPSULE: at 09:14

## 2024-05-22 RX ADMIN — HEPARIN SODIUM 5000 UNITS: 5000 INJECTION, SOLUTION INTRAVENOUS; SUBCUTANEOUS at 05:16

## 2024-05-22 RX ADMIN — OXYCODONE HYDROCHLORIDE 5 MG: 5 TABLET ORAL at 20:06

## 2024-05-22 RX ADMIN — FAMOTIDINE 20 MG: 20 TABLET, FILM COATED ORAL at 18:13

## 2024-05-22 RX ADMIN — CLOPIDOGREL BISULFATE 75 MG: 75 TABLET ORAL at 09:14

## 2024-05-22 RX ADMIN — FLUTICASONE FUROATE AND VILANTEROL TRIFENATATE 1 PUFF: 200; 25 POWDER RESPIRATORY (INHALATION) at 09:14

## 2024-05-22 RX ADMIN — NICOTINE 1 PATCH: 14 PATCH, EXTENDED RELEASE TRANSDERMAL at 09:13

## 2024-05-22 RX ADMIN — Medication 60 ML: at 20:06

## 2024-05-22 RX ADMIN — Medication 1 CAPSULE: at 21:05

## 2024-05-22 RX ADMIN — POLYETHYLENE GLYCOL 3350 17 G: 17 POWDER, FOR SOLUTION ORAL at 09:13

## 2024-05-22 RX ADMIN — Medication 1 CAPSULE: at 13:43

## 2024-05-22 RX ADMIN — HEPARIN SODIUM 5000 UNITS: 5000 INJECTION, SOLUTION INTRAVENOUS; SUBCUTANEOUS at 21:07

## 2024-05-22 RX ADMIN — HEPARIN SODIUM 5000 UNITS: 5000 INJECTION, SOLUTION INTRAVENOUS; SUBCUTANEOUS at 13:43

## 2024-05-22 RX ADMIN — CETIRIZINE HYDROCHLORIDE 5 MG: 5 TABLET ORAL at 09:14

## 2024-05-22 ASSESSMENT — ACTIVITIES OF DAILY LIVING (ADL)
ADLS_ACUITY_SCORE: 48
ADLS_ACUITY_SCORE: 45
ADLS_ACUITY_SCORE: 49
ADLS_ACUITY_SCORE: 45
ADLS_ACUITY_SCORE: 49
ADLS_ACUITY_SCORE: 45
ADLS_ACUITY_SCORE: 49
ADLS_ACUITY_SCORE: 48
ADLS_ACUITY_SCORE: 49
ADLS_ACUITY_SCORE: 45
ADLS_ACUITY_SCORE: 45
ADLS_ACUITY_SCORE: 48
ADLS_ACUITY_SCORE: 45

## 2024-05-22 NOTE — PLAN OF CARE
Diagnosis: R AKA I&D  POD#: 2  Mental Status: A&O x4  Activity/dangle: up 2 slider board, declined to be OOB this shift, changes position independently  Diet: renal and gluten free, 1500 fluid restriction  Pain: Oxycodone (before dressing change)  Alvarez/Voiding: intermittent pure wick use  02/LDA:  room air, PIV SL  D/C Date: pending  Other Info: Dialysis port R chest, Wound vac

## 2024-05-22 NOTE — PROGRESS NOTES
CLINICAL NUTRITION SERVICES  -  ASSESSMENT NOTE    Recommendations Ordered by Registered Dietitian (RD):   daily vanilla Margarita Farms w/ lunch  daily Expedite w/ dinner  Encouraged po intake, emphasizing the importance of protein for wound healing    Malnutrition: 5/22  % Weight Loss:  None noted  % Intake:  No decreased intake noted  Subcutaneous Fat Loss:  Orbital region mild depletion and Upper arm region mild depletion  Muscle Loss:  Temporal region mild depletion, Clavicle bone region mild depletion, and Dorsal hand region mild depletion  Fluid Retention:  Mild 2+    Malnutrition Diagnosis: Moderate malnutrition  In Context of:  Acute illness or injury     REASON FOR ASSESSMENT  Shirley Hendricks is a 63 year old female seen by Registered Dietitian for LOS    PMH of: anxiety, Charcot- Breonna Tooth disease, GERD, hypertension, hyperlipidemia, lupus, severe peripheral artery disease, GERD, supraventricular tachycardia, asthma, who was recently admitted in the hospital from 3/29/2024 to 4/22/2024 with right ischemic limb, severe peripheral artery disease, ultimately underwent above-knee amputation on the right side, course complicated by acute renal failure, rhabdomyolysis, sepsis, acute blood loss anemia, delirium, diarrhea and retroperitoneal hematoma. She was eventually discharged to acute rehab, now presenting because of wound dehiscence and necrosis that needs debridement. Underwent debridement of the stump on 5/16, cultures thus far negative     NUTRITION HISTORY  - Information obtained from chart review and pt.   - Diet history - pt has been admitted at [facility] and [facility], and is well known to nutrition services  - Supplements - Pt has struggled with receiving oral nutrition supplements w/ gluten. We reviewed options and Pt was OK having daily vanilla Margarita Farms and Expedites delivered as they both don't contain gluten.   - Dietary Restrictions: gluten    CURRENT NUTRITION ORDERS  Diet Order:  Gluten Free, Renal  "(Dialysis), and 1500 ml fluid restriction     Current Intake/Tolerance: Pt had no questions about her renal diet but did want a renal diet handout, and was eager to have this liberalized as her kidney function improves.  - Flowsheets show a good appetite and x2-3 intakes/day of 100%.     NUTRITION FOCUSED PHYSICAL ASSESSMENT FOR DIAGNOSING MALNUTRITION)  Yes    Observed:    Muscle wasting (refer to documentation in Malnutrition section) and Subcutaneous fat loss (refer to documentation in Malnutrition section)    Obtained from Chart/Interdisciplinary Team:  Non-healing wound AKA    ANTHROPOMETRICS  Height: 5' 1\"  Weight: 124 lbs 5.43 oz   Body mass index is 23.51 kg/m .  Weight Status:  Normal BMI  IBW: 47.8 kg  % IBW: 118%  Weight History:   Wt Readings from Last 10 Encounters:   05/21/24 56.4 kg (124 lb 5.4 oz)   05/14/24 57 kg (125 lb 10.6 oz)   04/22/24 50.5 kg (111 lb 5.3 oz)   01/08/24 49.8 kg (109 lb 12.8 oz)   12/12/23 50.8 kg (112 lb)   11/21/23 50.6 kg (111 lb 8 oz)   11/13/23 51.6 kg (113 lb 11.2 oz)   10/11/23 52.5 kg (115 lb 11.2 oz)   08/02/23 52.3 kg (115 lb 6.4 oz)   05/26/23 52.1 kg (114 lb 12.8 oz)      LABS  Labs reviewed     MEDICATIONS  Medications reviewed     ASSESSED NUTRITION NEEDS PER APPROVED PRACTICE GUIDELINES:  Dosing Weight 56.4 kg (actual)  Estimated Energy Needs: 1611-9862 kcals (25-35 Kcal/Kg)  Justification: wound healing  Estimated Protein Needs: 68-85+ grams protein (1.2-1.5+ g pro/Kg)  Justification: wound healing on LIMA  Estimated Fluid Needs: 1500 ml fluid restriction  Justification: per provider pending fluid status     MALNUTRITION:  % Weight Loss:  None noted  % Intake:  No decreased intake noted  Subcutaneous Fat Loss:  Orbital region mild depletion and Upper arm region mild depletion  Muscle Loss:  Temporal region mild depletion, Clavicle bone region mild depletion, and Dorsal hand region mild depletion  Fluid Retention:  Mild 2+    Malnutrition Diagnosis: Moderate " malnutrition  In Context of:  Acute illness or injury    NUTRITION DIAGNOSIS:  Increased nutrient needs (protein) related to wound healing as evidenced by active wound, mild fat loss, mild muscle losses, and need for oral nutrition supplements to help pt meet needs    NUTRITION INTERVENTIONS  Recommendations / Nutrition Prescription  daily vanilla Marg[employer]ita Farms w/ lunch  daily Expedite w/ dinner  Encouraged po intake, emphasizing the importance of protein for wound healing     Implementation  Nutrition education: Per Provider order if indicated, provided a renal menu handout  Medical Food Supplement     Nutrition Goals  PO intake - >75% of meal trays, or the equivalent in supplements, TID   Wound healing    MONITORING AND EVALUATION:  Progress towards goals will be monitored and evaluated per protocol and Practice Guidelines    Vasquez Wang RD, LD

## 2024-05-22 NOTE — PROGRESS NOTES
[facility]    Medicine Progress Note - Hospitalist Service    Date of Admission:  5/15/2024    Assessment & Plan   Shirley Hendricks is a 63 year old female with complex medical history which includes anxiety, Charcot- Breonna Tooth disease, GERD, hypertension, hyperlipidemia, lupus, severe peripheral artery disease, GERD, supraventricular tachycardia, asthma, who was recently admitted in the hospital from 3/29/2024 to 4/22/2024 with right ischemic limb, severe peripheral artery disease, ultimately underwent above-knee amputation on the right side, course complicated by acute renal failure, rhabdomyolysis, sepsis, acute blood loss anemia, delirium, diarrhea and retroperitoneal hematoma. She was eventually discharged to acute rehab, now presenting because of wound dehiscence and necrosis that needs debridement. Underwent debridement of the stump on 5/16, cultures thus far negative    R AKA c/b wound dehiscence  S/p wound vac placement  S/p washout 5/20/24  No evidence of a bacterial SSTI of the wound at this time. Wound was apparently necrotic but not overtly infected. Surgical cultures are negative thus far and patient doing well, continues to remain off antibiotics. Wound vac is in place, patient notes pain deloris when the stump is being evaluated.   Staph capitis from surgical culture is growing. This is a coag negative staph which has low pathogenicity, often a contaminant and unlikely to have caused wound dehiscence.  if no other organisms grow, prior hospitalist states would still opt for no antibiotics at this point.   Planned for repeat washout on 5/20.  Resumed plavix and crestor. Holding SQH. .  Defer resumption of AC to surgeons.  5/21/2024.  See vascular surgery note 5/21/2024.  Planned wound VAC change tomorrow 5/22/2024.  Resume SQH q 8 hours.  CBC in AM  5/22/2024, wound VAC change at bedside today.  Need clarification from Vascular Surgery regarding AC/ DVT PXP.  Patient was  admitted on Xarelto, now off.  Need assessment of Vascular Surgery regarding indications for DVT Pxp i.e. SQH every 8 hours when she is discharged to TCU or the Xarleto which was PTA.    Acute renal failure  Occurred during recent hospital course likely 2/2 rhabdomyolysis and ischemic injury. Has been dialysis depdent on Tuesday Thursday, Saturday. However, has been having increased urination and possible that she is having at least some renal recovry.  See nephrology note 5/20/2024 making urine.  Per nephrology hold dialysis 5/21/2024.  CR 5/21/2024 CR increased to 2.54, yesterday 2.22, continues to make urine.  -5/22/2024.  See nephrology note.  Continues to make urine.  CR stable 2.5 range last 3 days.  Hold HD.  Continue to monitor CR.    Anemia of chronic kidney disease  Patient has history of acute blood loss anemia, with retroperitoneal hematoma and surgeries.  Hemoglobin stable around 10 at this time.  No active bleeding at this time  -Recheck hemoglobin in a.m     Hypertension  Hyperlipidemia  History of coronary artery disease  No active chest pain at this time, blood pressure slightly on the higher side.  She is on amlodipine 10 mg daily, Coreg 6.25 mg twice daily, as needed hydralazine  PTA Bumex 4 mg 3-4 times a week on hold.     Possible celiac disease  History of loose stools  Patient is noncompliant with gluten-free diet.  Mild abdominal tenderness on exam.  At some point need to follow-up with the GI and counseling.     Asthma  GERD  No acute exacerbation of asthma, continue PTA Breo Ellipta   Continue PTA Pepcid 20 mg daily.     History of diabetes mellitus type 2  Last hemoglobin A1c was 5.2  Her Jardiance was discontinued.  She has had low blood sugars going down to 50s/60s range.  Placed on hypoglycemia protocol.  Currently on diet control     History of B-cell lymphoma  Followed by [place] [facility]  Continue outpatient surveillance and follow-up with oncology.     Hyponatremia  This is likely  secondary to renal failure.  Nephrology to follow,  ultrafiltrate with dialysis.       Diet: Combination Diet Gluten Free Diet; Renal Diet (dialysis)  Fluid restriction 1500 ML FLUID  Snacks/Supplements Adult: Margarita Garrison Standard Oral Supplement; With Meals    DVT Prophylaxis: Heparin SQ  Alvarez Catheter: Not present  Lines: PRESENT      CVC Double Lumen Right Internal jugular Tunneled-Site Assessment: WDL      Cardiac Monitoring: None  Code Status: Full Code      Clinically Significant Risk Factors         # Hyponatremia: Lowest Na = 128 mmol/L in last 2 days, will monitor as appropriate      # Hypoalbuminemia: Lowest albumin = 1.8 g/dL at 5/18/2024  7:25 AM, will monitor as appropriate     # Hypertension: Noted on problem list         # Moderate Malnutrition: based on nutrition assessment      # Financial/Environmental Concerns:           Disposition Plan     Medically Ready for Discharge: Anticipated in 2-4 Days       Bethanie Staley MD  Hospitalist Service  [facility]  Securely message with [employer] (more info)  Text page via [employer] Paging/[employer]     I spent 35 minutes total time in management of care today 5/22/2024 reviewing labs, medications, interdisciplinary notes; and completing documentation of encounter and orders with Care Management including counseling/discussion with the Patient regarding AKA wound and Coordinating Care and plan with Nursing and Specialists, Vasc Sx regarding  management and surveillance, as above.    ______________________________________________________________________    Interval History   Denies pain.  Afebrile.  Nursing notes no wound issue.  Wound VAC change per vascular surgery at bedside today.  Eating.      Physical Exam   Vital Signs: Temp: 98.4  F (36.9  C) Temp src: Oral BP: (!) 171/76 Pulse: 72   Resp: 16 SpO2: 96 % O2 Device: None (Room air)    Weight: 124 lbs 5.43 oz    General Appearance: NAD, awake, calm, cooperative  Respiratory:  Respirations nonlabored room air  GI: Soft, nontender.  Neuro.  Gross motor tested, nonfocal,  Psych oriented, affect calm.  Skin, decubitus wound on buttock, dressed.  MSK 1+ pitting edema left leg, right stump with wound vac, dressed      Data     I have personally reviewed the following data over the past 24 hrs:    N/A  \   N/A   / N/A     128 (L) 93 (L) 37.1 (H) /  85   4.9 23 2.51 (H) \       Imaging results reviewed over the past 24 hrs:

## 2024-05-22 NOTE — PROGRESS NOTES
Vascular Surgery Progress Note  05/22/2024       Subjective:  Doing well this morning resting comfortably in bed.     Objective:  Temp:  [97.9  F (36.6  C)-98.4  F (36.9  C)] 98.4  F (36.9  C)  Pulse:  [63-72] 72  Resp:  [16-18] 16  BP: (151-171)/(70-95) 171/76  SpO2:  [92 %-96 %] 96 %    I/O last 3 completed shifts:  In: -   Out: 200 [Urine:200]      Gen: Awake, alert, NAD  CV: RRR per radial pulse  Resp: NLB on room air  Incision: c/d/I, vacuum seal appropriately  Ext: WWP, no edema  Vascular: Palpable right femoral pulse   Labs:  Recent Labs   Lab 05/21/24  0725 05/20/24  0820 05/17/24  1137   WBC 3.9* 4.2 5.8   HGB 9.3* 10.3* 10.2*    194 207       Recent Labs   Lab 05/22/24  0754 05/21/24  0725 05/20/24  0820 05/19/24  0744   * 129* 133* 132*   POTASSIUM 4.9 4.8 4.6 4.2   CHLORIDE 93* 95* 98 99   CO2 23 24 26 27   BUN 37.1* 33.1* 24.0* 17.2   CR 2.51* 2.54* 2.22* 1.95*   GLC 85 123* 74 71   SONIA 7.5* 7.5* 7.7* 7.5*   PHOS  --  6.1* 4.9* 3.6       Imaging:  No new imaging reviewed     Assessment/Plan:   63 year old female with extensive past vascular surgical history who is now s/p RLE AKA after acute thrombosis of CFA-AT bypass with ALI in early April of this year. Unfortunately AKA had areas of necrosis on anterior aspect, now s/p I&D, wound vac placement on 5/16.  VAC change in OR on 5/20    -[facility] consult for VAC change at bedside today if patient tolerates VAC change  -May be appropriate for discharge as soon as this afternoon   - social work consult patient interested in going to [facility] family and ARU had been looking into this        Discussed with vascular surgery staff, who is in agreement with the above.  - - - - - - - - - - - - - - - - - -  Bj Lo, PGY-3  Vascular Surgery Resident

## 2024-05-22 NOTE — CONSULTS
[facility]  WO Nurse Inpatient Assessment     Consulted for: esme  5/22/24: New consult for Right AKA/thigh    Summary: POA - healed PI to JAVIER victoria signing off  5/22: Severe pain with Tape removal at dressing change, discussed potential for showering prior to NPWT dressing changes and being pre-medicated for pain prior to dressing changes     Patient History (according to provider note(s):      63 year old female with complex medical history which include anxiety, charcoaled Breonna tooth disease, GERD, hypertension, hyperlipidemia, lupus, severe peripheral artery disease, GERD, supraventricular tachycardia, asthma, was recently admitted in the hospital from 3/29/2024 to 4/22/2024 with right ischemic limb severe peripheral artery disease, ultimately require above-knee amputation the right side, course complicated by acute renal failure, rhabdomyolysis, sepsis, acute blood loss anemia, delirium, diarrhea and retroperitoneal hematoma will eventually discharged to acute rehab now coming from acute rehab because of wound dehiscence, necrosis mild erythema that needs debridement.     Assessment:      Areas visualized during today's visit: Focused: and Sacrum/coccyx  5/22: R AKA/Thigh    Negative pressure wound therapy applied to: Right AKA and Right Thigh      Right thigh 5/22      Right medial AKA 5/22   Right AKA incision 5/22  Last photo: 5/22/24   Wound due to: Surgical Wound   Wound history/plan of care:    Surgical date: 5/20/24   Service following: Vascular  Date Negative Pressure Wound Therapy initiated: 5/20/24   Interventions in place: repositioning, pressure redistribution with device or dressing, specialty surface in use, moisture/incontinence management, offloading, and elevation  Is patient s nutritional status compromised? NO, but reports little appetite  If yes, what interventions are in place? other nutrition consult to be placed  Reason for initiating vac therapy? Need for  accelerated granulation tissue  Which?of?the?following?co-morbidities?apply? Immobility and PAD  If diabetic is patient on a diabetic management program? N/A   Is osteomyelitis present in wound? no   If yes what treatments are in place? N/A      Wound base: right thigh: 100 % muscle and adipose tissue, Right medial: 70 % granulation tissue and muscle 30% slough     Palpation of the wound bed: normal       Drainage: moderate      Volume in cannister: <50ml     Last cannister change date: 5/20/24     Description of drainage: serosanguinous and bloody      Measurements (length x width x depth, in cm) Right Thigh: 16 x 9 x 1.5 Right medial AKA: 4.5 x 3.5 x 2cm       Tunneling N/A      Undermining up to 1 cm from 11 o'clock   Periwound skin: Denuded, Edematous, and Erythema- blanchable       Color: pink and red       Temperature: normal    Odor: none   Pain: moderate, Irritable or crying out at intervals, tension to hands, feet and body, facial expression of distress, and during dressing change, during tape removal aching, shooting, constant, sharp, right-sided, and stinging   Pain intervention prior to dressing change: oral oxycodone, soaking, and slow and gentle cares   Treatment goal: Heal , Drainage control, Infection control/prevention, Increase granulation, Maintain (prevention of deterioration), Pain control, Protection, and Promote epidermal migration  STATUS: initial assessment   Supplies ordered: gathered, supplies stored on unit, and discussed with patient     Number of foam pieces removed from a wound (excluding foam for bridge) :  2 GranuFoam Black   Verified this matched the number of foam pieces applied last dressing change: Yes   Number of foam pieces packed into wound (excluding foam for bridge) :  3 GranuFoam Black and 2 Oil emulsion dressing      Wound location: coccyx (present on admission) WOC signed off 5/20/24      Last photo: 5/20  Wound due to: Pressure Injury    Wound history/plan of care:  Patient known to [facility] from previous admissions, skin is re-epithelialized at this point, but this patient is at high risk for her wound to reopen. Educated patient on pressure injury prevention and she verbalized understanding.    Wound base: 100 % epidermis     Palpation of the wound bed: bone palpable through the skin     Drainage: none     Description of drainage: none     Measurements (length x width x depth, in cm): 3  x 3 cm area of blanchable redness with a thin line of blanchable redness extending down into the gluteal crease     Tunneling: N/A     Undermining: N/A  Periwound skin: Intact and Erythema- blanchable      Color: red      Temperature: normal   Odor: none  Pain: denies , none  Pain interventions prior to dressing change: N/A  Treatment goal: Maintain (prevention of deterioration) and Protection  STATUS: initial assessment  Supplies ordered: gathered, at bedside, supplies stored on unit, discussed with RN, and discussed with patient       Treatment Plan:     Negative pressure wound therapy plan:  Wound location: Right thigh and right AKA   Change Days: Mon/Wed/Fri by [facility] RN    Supplies (including all accessories) used: large Black foam , Adaptic/Curity oil emulsion contact layer , and Cavilon no sting barrier film  Cleanse with MicroKlenz prior to replacing NPWT  Suction setting: -125   Methods used: Window paned all periwound skin with vac drape prior to applying sponge, Bridged trac pad off bony prominences, Spiral cut foam prior to packing into wound , and Cut foam in half to reduce profile    Staff RN to assess integrity of dressing and ensure suction is set at appropriate level every shift.   Date canister. Chart canister output every shift. Change cannister weekly and PRN if full/occluded     Remove foam dressing and replace with BID normal saline moist gauze dressing if:   -a dressing failure which cannot be repaired within 2 hours   -patient is discharging to home without a home pump  "  -patient is discharging to a facility outside the local area   -if a dressing is a \"Silver Foam\", remove before Radiation Therapy or MRI     The hospital VAC pump is not to be discharged with the patient.?Ensure to disconnect patient from machine prior to discharge. Then,    - If a home KCI VAC pump has been delivered, connect home cannister to dressing tubing then connect cannister to home pump and turn on machine    - If transferring to a nearby facility with a KCI vac, can disconnect and clamp tubing then cover end with glove so can be reconnected within 2 hours       Coccyx/Sacrum - newly healed area: Apply Sacral Mepilex every other day and prn if soiled or damaged     Pressure Injury Prevention (PIP) Plan:  If patient is declining pressure injury prevention interventions: Explore reason why and address patient's concerns, Educate on pressure injury risk and prevention intervention(s), If patient is still declining, document \"informed refusal\" , and Ensure Care team is aware ( provider, charge nurse, etc)  Mattress: Follow bed algorithm, reassess daily and order specialty mattress, if indicated.  HOB: Maintain at or below 30 degrees, unless contraindicated  Repositioning in bed: Every 1-2 hours , Left/right positioning; avoid supine, and Raise foot of bed prior to raising head of bed, to reduce patient sliding down (shear)  Heels: Keep elevated off mattress and Pillows under calves  Protective Dressing: Sacral Mepilex for prevention (#[id number]),  especially for the agitated patient   Positioning Equipment:None  Chair positioning: Repositions self: patient to shift weight every 15 minutes, Assist patient to reposition hourly, and Do NOT use a donut for sitting (this increases pressure to smaller area and creates a higher potential for injury)    If patient has a buttock pressure injury, or high risk for PI use chair cushion or SPS.  Moisture Management: Perineal cleansing /protection: Follow Incontinence " Protocol, Avoid brief in bed, Clean and dry skin folds with bathing , and Moisturize dry skin  Under Devices: Inspect skin under all medical devices during skin inspection , Ensure tubes are stabilized without tension, and Ensure patient is not lying on medical devices or equipment when repositioned  Ask provider to discontinue device when no longer needed.     Orders: Reviewed, Written, and Updated    RECOMMEND PRIMARY TEAM ORDER: None, at this time  Education provided: importance of repositioning, plan of care, wound progress, and Off-loading pressure  Discussed plan of care with: Patient and Nurse  [facility] nurse follow-up plan: Monday/WednesdayFriday  Notify WOC if wound(s) deteriorate.  Nursing to notify the Provider(s) and re-consult the WO Nurse if new skin concern.    DATA:     Current support surface: Standard  Standard Isoflex gel  Containment of urine/stool: Incontinent pad in bed  BMI: Body mass index is 23.51 kg/m .   Active diet order: Orders Placed This Encounter      Combination Diet Gluten Free Diet; Renal Diet (dialysis)     Output: I/O last 3 completed shifts:  In: 250 [P.O.:250]  Out: 200 [Urine:200]     Labs:   Recent Labs   Lab 05/21/24  0725   ALBUMIN 2.0*   HGB 9.3*   WBC 3.9*     Pressure injury risk assessment:   Sensory Perception: 4-->no impairment  Moisture: 3-->occasionally moist  Activity: 3-->walks occasionally  Mobility: 3-->slightly limited  Nutrition: 3-->adequate  Friction and Shear: 3-->no apparent problem  Cesar Score: 19    Maegan Jane, RN, BSN, CWON  Pager no longer is use, please contact through [employer]: [facility] Nurse [place]

## 2024-05-22 NOTE — PLAN OF CARE
Diagnosis: R AKA I&D  POD#: 2  Mental Status: A&Ox4  Activity/dangle up 2 slider board  Diet: renal and gluten free  Pain: oxycodone  Alvarez/Voiding: lianck  02/LDA: RATomy CHRISTENSEN  D/C Date: pending  Other Info: Dialysis port R chest. Wound vac.

## 2024-05-22 NOTE — PROGRESS NOTES
SPIRITUAL [facility] SERVICES - Progress Note   [facility]     Referral Source: Introductory Assessment      I visited Shirley per meaningful visits from previous hospitalizations. Shirley shared that she is in pain and her medication was starting to take effect, so she declined a visit at this time. She requested a follow up visit at a later time.      Plan: [facility] will continue to follow and attempt another visit tomorrow. [facility] remains available upon request.       Mira Nava (they/themRamon Cifuentes  Spiritual [facility] Services  Chaplain Resident    [facility] routine referrals?*[phone]  [facility] available 24/7 for emergent requests/referrals, either by paging the on-call  or by entering an ASAP/STAT consult in Epic (this will also page the on-call ).

## 2024-05-23 ENCOUNTER — APPOINTMENT (OUTPATIENT)
Dept: PHYSICAL THERAPY | Facility: CLINIC | Age: 64
DRG: 464 | End: 2024-05-23
Attending: INTERNAL MEDICINE
Payer: COMMERCIAL

## 2024-05-23 ENCOUNTER — APPOINTMENT (OUTPATIENT)
Dept: OCCUPATIONAL THERAPY | Facility: CLINIC | Age: 64
DRG: 464 | End: 2024-05-23
Attending: INTERNAL MEDICINE
Payer: COMMERCIAL

## 2024-05-23 LAB
ANION GAP SERPL CALCULATED.3IONS-SCNC: 11 MMOL/L (ref 7–15)
BACTERIA TISS BX CULT: NORMAL
BUN SERPL-MCNC: 43.1 MG/DL (ref 8–23)
CALCIUM SERPL-MCNC: 7.6 MG/DL (ref 8.8–10.2)
CHLORIDE SERPL-SCNC: 95 MMOL/L (ref 98–107)
CREAT SERPL-MCNC: 2.76 MG/DL (ref 0.51–0.95)
DEPRECATED HCO3 PLAS-SCNC: 21 MMOL/L (ref 22–29)
EGFRCR SERPLBLD CKD-EPI 2021: 18 ML/MIN/1.73M2
GLUCOSE SERPL-MCNC: 105 MG/DL (ref 70–99)
PHOSPHATE SERPL-MCNC: 5.5 MG/DL (ref 2.5–4.5)
POTASSIUM SERPL-SCNC: 5.3 MMOL/L (ref 3.4–5.3)
SODIUM SERPL-SCNC: 127 MMOL/L (ref 135–145)

## 2024-05-23 PROCEDURE — 97530 THERAPEUTIC ACTIVITIES: CPT | Mod: GO

## 2024-05-23 PROCEDURE — 120N000001 HC R&B MED SURG/OB

## 2024-05-23 PROCEDURE — 99232 SBSQ HOSP IP/OBS MODERATE 35: CPT | Performed by: INTERNAL MEDICINE

## 2024-05-23 PROCEDURE — 97110 THERAPEUTIC EXERCISES: CPT | Mod: GO

## 2024-05-23 PROCEDURE — 36415 COLL VENOUS BLD VENIPUNCTURE: CPT | Performed by: STUDENT IN AN ORGANIZED HEALTH CARE EDUCATION/TRAINING PROGRAM

## 2024-05-23 PROCEDURE — 250N000013 HC RX MED GY IP 250 OP 250 PS 637: Performed by: HOSPITALIST

## 2024-05-23 PROCEDURE — 82565 ASSAY OF CREATININE: CPT | Performed by: STUDENT IN AN ORGANIZED HEALTH CARE EDUCATION/TRAINING PROGRAM

## 2024-05-23 PROCEDURE — 82374 ASSAY BLOOD CARBON DIOXIDE: CPT | Performed by: STUDENT IN AN ORGANIZED HEALTH CARE EDUCATION/TRAINING PROGRAM

## 2024-05-23 PROCEDURE — 99233 SBSQ HOSP IP/OBS HIGH 50: CPT | Performed by: HOSPITALIST

## 2024-05-23 PROCEDURE — 250N000013 HC RX MED GY IP 250 OP 250 PS 637: Performed by: INTERNAL MEDICINE

## 2024-05-23 PROCEDURE — 97530 THERAPEUTIC ACTIVITIES: CPT | Mod: GP | Performed by: PHYSICAL THERAPIST

## 2024-05-23 PROCEDURE — 99231 SBSQ HOSP IP/OBS SF/LOW 25: CPT | Mod: 24

## 2024-05-23 PROCEDURE — 84100 ASSAY OF PHOSPHORUS: CPT | Performed by: INTERNAL MEDICINE

## 2024-05-23 PROCEDURE — 250N000011 HC RX IP 250 OP 636

## 2024-05-23 PROCEDURE — 250N000013 HC RX MED GY IP 250 OP 250 PS 637

## 2024-05-23 RX ORDER — BUMETANIDE 1 MG/1
2 TABLET ORAL DAILY
Status: DISCONTINUED | OUTPATIENT
Start: 2024-05-23 | End: 2024-05-29 | Stop reason: HOSPADM

## 2024-05-23 RX ORDER — CARVEDILOL 12.5 MG/1
12.5 TABLET ORAL 2 TIMES DAILY WITH MEALS
Status: DISCONTINUED | OUTPATIENT
Start: 2024-05-23 | End: 2024-05-29 | Stop reason: HOSPADM

## 2024-05-23 RX ORDER — SEVELAMER HYDROCHLORIDE 400 MG/1
400 TABLET, FILM COATED ORAL
Status: DISCONTINUED | OUTPATIENT
Start: 2024-05-23 | End: 2024-05-29 | Stop reason: HOSPADM

## 2024-05-23 RX ADMIN — HEPARIN SODIUM 5000 UNITS: 5000 INJECTION, SOLUTION INTRAVENOUS; SUBCUTANEOUS at 20:55

## 2024-05-23 RX ADMIN — BUMETANIDE 2 MG: 1 TABLET ORAL at 18:33

## 2024-05-23 RX ADMIN — CLOPIDOGREL BISULFATE 75 MG: 75 TABLET ORAL at 08:44

## 2024-05-23 RX ADMIN — CARVEDILOL 6.25 MG: 3.12 TABLET, FILM COATED ORAL at 08:44

## 2024-05-23 RX ADMIN — SEVELAMER HYDROCHLORIDE 400 MG: 400 TABLET, FILM COATED ORAL at 20:56

## 2024-05-23 RX ADMIN — ROSUVASTATIN CALCIUM 10 MG: 10 TABLET, FILM COATED ORAL at 21:02

## 2024-05-23 RX ADMIN — POLYETHYLENE GLYCOL 3350 17 G: 17 POWDER, FOR SOLUTION ORAL at 08:43

## 2024-05-23 RX ADMIN — OXYCODONE HYDROCHLORIDE 5 MG: 5 TABLET ORAL at 20:57

## 2024-05-23 RX ADMIN — GABAPENTIN 200 MG: 100 CAPSULE ORAL at 21:02

## 2024-05-23 RX ADMIN — AMLODIPINE BESYLATE 10 MG: 10 TABLET ORAL at 08:44

## 2024-05-23 RX ADMIN — Medication 60 ML: at 08:53

## 2024-05-23 RX ADMIN — HEPARIN SODIUM 5000 UNITS: 5000 INJECTION, SOLUTION INTRAVENOUS; SUBCUTANEOUS at 04:41

## 2024-05-23 RX ADMIN — CARVEDILOL 12.5 MG: 12.5 TABLET, FILM COATED ORAL at 18:33

## 2024-05-23 RX ADMIN — NICOTINE 1 PATCH: 14 PATCH, EXTENDED RELEASE TRANSDERMAL at 08:52

## 2024-05-23 RX ADMIN — Medication 1 CAPSULE: at 08:45

## 2024-05-23 RX ADMIN — Medication 1 CAPSULE: at 20:56

## 2024-05-23 RX ADMIN — ONDANSETRON 4 MG: 4 TABLET, ORALLY DISINTEGRATING ORAL at 14:19

## 2024-05-23 RX ADMIN — Medication 1 CAPSULE: at 13:21

## 2024-05-23 RX ADMIN — CETIRIZINE HYDROCHLORIDE 5 MG: 5 TABLET ORAL at 08:45

## 2024-05-23 RX ADMIN — HEPARIN SODIUM 5000 UNITS: 5000 INJECTION, SOLUTION INTRAVENOUS; SUBCUTANEOUS at 13:21

## 2024-05-23 RX ADMIN — FLUTICASONE FUROATE AND VILANTEROL TRIFENATATE 1 PUFF: 200; 25 POWDER RESPIRATORY (INHALATION) at 08:43

## 2024-05-23 ASSESSMENT — ACTIVITIES OF DAILY LIVING (ADL)
ADLS_ACUITY_SCORE: 48
ADLS_ACUITY_SCORE: 48
ADLS_ACUITY_SCORE: 49
ADLS_ACUITY_SCORE: 51
ADLS_ACUITY_SCORE: 48
ADLS_ACUITY_SCORE: 49
ADLS_ACUITY_SCORE: 48
ADLS_ACUITY_SCORE: 48
ADLS_ACUITY_SCORE: 51
ADLS_ACUITY_SCORE: 48
ADLS_ACUITY_SCORE: 49
ADLS_ACUITY_SCORE: 49
ADLS_ACUITY_SCORE: 48
ADLS_ACUITY_SCORE: 48
ADLS_ACUITY_SCORE: 51
ADLS_ACUITY_SCORE: 48
ADLS_ACUITY_SCORE: 49
ADLS_ACUITY_SCORE: 48
ADLS_ACUITY_SCORE: 48

## 2024-05-23 NOTE — PROGRESS NOTES
Vascular Surgery Progress Note    Resting comfortably in bed, no acute issues overnight. Continues to have improved kidney function while off of dialysis.    Palpable right femoral pulse, 2+ radial pulse. No edema noted    Change wound vac MWF     Assessment/Plan:  63 year old female with extensive past vascular surgical history who is now s/p RLE AKA after acute thrombosis of CFA-AT bypass with ALI in early April of this year. Unfortunately AKA had areas of necrosis on anterior aspect, now s/p I&D, wound vac placement on 5/16.  VAC change in OR on 5/20     -able to discharge to TCU vs ARU from Vascular Surgery standpoint  -follow-up scheduled      Discussed with vascular surgery staff, who is in agreement of above.    Kaylee Liu, MILO    STAFF continues to improve.  Very upbeat this morning.  Tolerating diet with protein supplements:.  +3 left graft pulse.  Wound VAC to right leg working well.    Resolving LIMA with good urine output and stable serum creatinine.  Being followed by Nephrology--dialysis on hold for now.    Spoke with patient and her sister who was on the phone this morning about progress.       Chadwick Lozano MD

## 2024-05-23 NOTE — PROGRESS NOTES
Nephrology Progress Note  05/23/2024         Assessment & Recommendations:   Shirley Hendricks is a 63 year old female who was admitted on 5/15/2024.      1) Dialysis dependent LIMA: Making more urine.  Kidney function definitely seems to be improving.   Creatinine continues to creep up slightly.   Continue to hold dialysis.  Daily renal panel.  Strict I's/O.  Keep dialysis catheter in for now.    Hold discharge today.  Need to have definitive plans regarding dialysis prior to discharge.  If kidney function improving tomorrow can likely discharge from renal standpoint with close follow-up in [facility] nephrology within a week.       2) RLE AKA wound dehiscence and necrosis: Status post I&D     3)Anemia: HGB up to 9.3 .  Mircera as outpatient     4) HTN: Continue amlodipine, carvedilol,   Blood pressure high.  Increase carvedilol to 12.5 mg twice daily.  Start Bumex 2 mg daily    5 hyponatremia-with renal failure.   fluid restriction of 1500 cc/day.    Start Bumex 2 mg daily.    6 hyperphosphatemia-started on sevelamer for 100 mg 3 times daily with meals        Nereida Vaz MD  [facility] - Nephrology   [phone]      Interval History :   Seen / examined  No acute issues overnight  Making urine.    Creatinine continues to creep up slowly.  2.75 today.  Phosphorus improved to 5.5.  Bicarb 21.  Potassium borderline at 5.3.  Sodium down to 127.  Denies shortness of breath.      Physical Exam:   I/O last 3 completed shifts:  In: 730 [P.O.:730]  Out: 600 [Urine:550; Drains:50]  BP (!) 183/79 (BP Location: Left arm)   Pulse 71   Temp 98.4  F (36.9  C) (Axillary)   Resp 16   Wt 64.8 kg (142 lb 13.7 oz)   LMP  (LMP Unknown)   SpO2 92%   BMI 27.01 kg/m      GENERAL APPEARANCE: alert and no distress  EYES:  no scleral icterus, pupils equal  PULM: lungs clear to auscultation, equal air movement, no cyanosis or clubbing  CV: regular rhythm, normal rate, no rub     -JVP -     -Rt BKA .  Trace-1+ edema.  GI:  soft, non tender,   NEURO: mentation intact and speech normal, no asterixis   Access Rt internal jugular TDC     Labs:   All labs reviewed by me  Electrolytes/Renal -   Recent Labs   Lab Test 05/23/24  0813 05/22/24  0754 05/21/24  0725 05/20/24  0820 05/15/24  0542 05/14/24  0805 05/13/24  0706 05/12/24  1309   * 128* 129* 133*   < > 128* 129*  --    POTASSIUM 5.3 4.9 4.8 4.6   < > 4.4 4.3  --    CHLORIDE 95* 93* 95* 98   < > 96* 96*  --    CO2 21* 23 24 26   < > 25 25  --    BUN 43.1* 37.1* 33.1* 24.0*   < > 22.5 14.2  --    CR 2.76* 2.51* 2.54* 2.22*   < > 3.18* 2.64*  --    * 85 123* 74   < > 90 68*  --    SONIA 7.6* 7.5* 7.5* 7.7*   < > 7.5* 7.4*  --    MAG  --   --   --   --   --  1.8 1.9 2.1   PHOS 5.5*  --  6.1* 4.9*   < > 3.7 2.6  --     < > = values in this interval not displayed.       CBC -   Recent Labs   Lab Test 05/21/24  0725 05/20/24  0820 05/17/24  1137   WBC 3.9* 4.2 5.8   HGB 9.3* 10.3* 10.2*    194 207       LFTs -   Recent Labs   Lab Test 05/21/24  0725 05/20/24  0820 05/19/24  0744 05/17/24  1137 05/16/24  0832 05/15/24  0542 05/14/24  0805 05/08/24  0546 05/07/24  0553   ALKPHOS  --   --   --   --  74  --  82  --  90   BILITOTAL  --   --   --   --  0.2  --  0.3  --  0.5   ALT  --   --   --   --  9  --  7  --  12   AST  --   --   --   --  13  --  16  --  14   PROTTOTAL  --   --   --   --  4.2*  --  4.3*  --  4.1*   ALBUMIN 2.0* 2.0* 1.9*   < > 1.9*   < > 2.0*   < > 1.9*    < > = values in this interval not displayed.       Iron Panel -   Recent Labs   Lab Test 04/10/24  0559 04/09/24  1817 01/08/24  1542 12/11/23  0550   IRON 22*  --  71 122   IRONSAT 18  --  17 32   BRYN  --  609* 25 23             Current Medications:  Current Facility-Administered Medications   Medication Dose Route Frequency Provider Last Rate Last Admin    - MEDICATION INSTRUCTIONS for Dialysis Patients -   Does not apply See Admin Instructions Levy Byers MD        amLODIPine (NORVASC) tablet 10  mg  10 mg Oral Daily Manuel Robledo MD   10 mg at 05/23/24 0844    [Held by provider] bumetanide (BUMEX) tablet 2 mg  2 mg Oral Daily Nereida Vaz MD        carvedilol (COREG) tablet 12.5 mg  12.5 mg Oral BID w/meals Nereida Vaz MD        cetirizine (zyrTEC) tablet 5 mg  5 mg Oral Daily Manuel Robledo MD   5 mg at 05/23/24 0845    clopidogrel (PLAVIX) tablet 75 mg  75 mg Oral Daily Gala Lo MD   75 mg at 05/23/24 0844    famotidine (PEPCID) tablet 20 mg  20 mg Oral Q48H Gala Lo MD   20 mg at 05/22/24 1813    Or    famotidine (PEPCID) injection 20 mg  20 mg Intravenous Q48H Gala Lo MD        fluticasone-vilanterol (BREO ELLIPTA) 200-25 MCG/ACT inhaler 1 puff  1 puff Inhalation Daily Manuel Robledo MD   1 puff at 05/23/24 0843    gabapentin (NEURONTIN) capsule 200 mg  200 mg Oral Once per day on Tuesday Thursday Saturday Manuel Robledo MD   200 mg at 05/21/24 2106    heparin ANTICOAGULANT injection 5,000 Units  5,000 Units Subcutaneous Q8H Gala Lo MD   5,000 Units at 05/23/24 1321    lactobacillus rhamnosus (GG) (CULTURELL) capsule 1 capsule  1 capsule Oral BID Manuel Robledo MD   1 capsule at 05/23/24 0845    miconazole (MICATIN) 2 % powder   Topical BID Manuel Robledo MD   Given at 05/19/24 0849    multivitamin RENAL (TRIPHROCAPS) capsule 1 capsule  1 capsule Oral Daily Manuel Robledo MD   1 capsule at 05/23/24 1321    nicotine (NICODERM CQ) 14 MG/24HR 24 hr patch 1 patch  1 patch Transdermal Daily Manuel Robledo MD   1 patch at 05/23/24 0852    polyethylene glycol (MIRALAX) Packet 17 g  17 g Oral Daily Gala Lo MD   17 g at 05/23/24 0843    rosuvastatin (CRESTOR) tablet 10 mg  10 mg Oral At Bedtime Manuel Robledo MD   10 mg at 05/22/24 2110    [Held by provider] senna-docusate (SENOKOT-S/PERICOLACE) 8.6-50 MG per tablet 1-2 tablet  1-2 tablet Oral BID Manuel Robledo MD        sevelamer HCl (RENAGEL) tablet  400 mg  400 mg Oral TID w/meals Nereida Vaz MD        silver sulfADIAZINE (SILVADENE) 1 % cream   Topical Daily Manuel Robledo MD        sodium chloride (PF) 0.9% PF flush 3 mL  3 mL Intracatheter Q8H Gala Lo MD   3 mL at 05/23/24 0852    wound support modular (EXPEDITE) bottle 60 mL  60 mL Oral Daily Levy Byers MD   60 mL at 05/23/24 0853     Current Facility-Administered Medications   Medication Dose Route Frequency Provider Last Rate Last Admin    sodium chloride 0.9 % infusion   Intravenous Continuous Gala Lo MD Jiwan Thapa, MD

## 2024-05-23 NOTE — PROGRESS NOTES
Care Management Follow Up    Length of Stay (days): 8    Expected Discharge Date: 05/23/2024     Concerns to be Addressed: discharge planning     Patient plan of care discussed at interdisciplinary rounds: Yes    Anticipated Discharge Disposition: Transitional Care     Anticipated Discharge Services:    Anticipated Discharge DME:      Patient/family educated on Medicare website which has current facility and service quality ratings: yes  Education Provided on the Discharge Plan:    Patient/Family in Agreement with the Plan: yes    Referrals Placed by CM/SW: Post Acute Facilities  Private pay costs discussed: Not applicable    Additional Information:  Message sent to [facility] to see if they'd reconsider accepting patient as she was denied. Writer told that they will ask DON again but have concerns regarding her discharge plan if her home isn't ready for her to return. Referral resent as was requested.    Call from Yue, patient's sister. She confirmed that the house remodel is due to be completed the week of June 1st. She will have her son and her brother staying with her to help when she moves back home. She aid that their other sister plans to come and stay for 3 weeks as well. Yue provided additional choices in [place]: [facility], [facility] and [facility] in the event that [facility] can't accept her.     If patient needs to continue on dialysis, Yue said that they already  have rides in place so that will be taken care of by family. Additional referrals sent. Will update both patient and sister when status is known.    1600: met with patient to introduce self and give an update. She is very hopeful that [facility] will accept her as she would like to go there. Will follow up tomorrow and update her.     DAWN Brown

## 2024-05-23 NOTE — PROGRESS NOTES
[facility]    Medicine Progress Note - Hospitalist Service    Date of Admission:  5/15/2024    Assessment & Plan   Shirley Hendricks is a 63 year old female with complex medical history which includes anxiety, Charcot- Breonna Tooth disease, GERD, hypertension, hyperlipidemia, lupus, severe peripheral artery disease, GERD, supraventricular tachycardia, asthma, who was recently admitted in the hospital from 3/29/2024 to 4/22/2024 with right ischemic limb, severe peripheral artery disease, ultimately underwent above-knee amputation on the right side, course complicated by acute renal failure, rhabdomyolysis, sepsis, acute blood loss anemia, delirium, diarrhea and retroperitoneal hematoma. She was eventually discharged to acute rehab, now presenting because of wound dehiscence and necrosis that needs debridement. Underwent debridement of the stump on 5/16, cultures thus far negative    R AKA c/b wound dehiscence  S/p wound vac placement  S/p washout 5/20/24  No evidence of a bacterial SSTI of the wound at this time. Wound was apparently necrotic but not overtly infected. Surgical cultures are negative thus far and patient doing well, continues to remain off antibiotics. Wound vac is in place, patient notes pain deloris when the stump is being evaluated.   Staph capitis from surgical culture is growing. This is a coag negative staph which has low pathogenicity, often a contaminant and unlikely to have caused wound dehiscence.  if no other organisms grow, prior hospitalist states would still opt for no antibiotics at this point.   Planned for repeat washout on 5/20.  Resumed plavix and crestor. Holding SQH. .  Defer resumption of AC to surgeons.  5/21/2024.  See vascular surgery note 5/21/2024.  Planned wound VAC change tomorrow 5/22/2024.  Resume SQH q 8 hours.  CBC in AM  5/22/2024, wound VAC change at bedside Need clarification from Vascular Surgery regarding AC/ DVT PXP.  Patient was admitted  on Xarelto, now off.  Need assessment of Vascular Surgery regarding indications for DVT Pxp i.e. SQH every 8 hours when she is discharged to TCU   5/23/2024 see vascular surgery note.  Statement will arrange follow-up.  Discussed with charge nurse to discuss with vascular surgery, needs assessment of vascular surgery regarding indications for DVT PXP, i.e. currently SQH every 8 hours when she is discharged to TCU.  Discussion with patient it appears that PTA Xarelto was prior to amputation.    Acute renal failure  Occurred during recent hospital course likely 2/2 rhabdomyolysis and ischemic injury. Has been dialysis depdent on Tuesday Thursday, Saturday. However, has been having increased urination and possible that she is having at least some renal recovry.  See nephrology note 5/20/2024 making urine.  Per nephrology hold dialysis 5/21/2024.  CR 5/21/2024 CR increased to 2.54, yesterday 2.22, continues to make urine.  -5/22/2024.  See nephrology note.  Continues to make urine.  CR stable 2.5 range last 3 days.  Hold HD.  Continue to monitor CR.  5/23/2024 serial increase in CR since hold of HD, currently 2.76, with HD 1.9-2.2.  Continues to make urine.  Currently HD on hold.  See Nephrology Note 5/23/2024 regarding need for definitive plan regarding dialysis prior to discharge.  BMP in a.m.    Anemia of chronic kidney disease  Patient has history of acute blood loss anemia, with retroperitoneal hematoma and surgeries.  Hemoglobin stable around 10 at this time.  No active bleeding at this time  -Recheck hemoglobin in a.m     Hypertension  Hyperlipidemia  History of coronary artery disease  No active chest pain at this time, blood pressure slightly on the higher side.  She is on amlodipine 10 mg daily, Coreg 6.25 mg twice daily, as needed hydralazine  PTA Bumex 4 mg 3-4 times a week on hold.     Possible celiac disease  History of loose stools  Patient is noncompliant with gluten-free diet.  Mild abdominal tenderness  on exam.  At some point need to follow-up with the GI and counseling.     Asthma  GERD  No acute exacerbation of asthma, continue PTA Breo Ellipta   Continue PTA Pepcid 20 mg daily.     History of diabetes mellitus type 2  Last hemoglobin A1c was 5.2  Her Jardiance was discontinued.  She has had low blood sugars going down to 50s/60s range.  Placed on hypoglycemia protocol.  Currently on diet control     History of B-cell lymphoma  Followed by [place] [facility]  Continue outpatient surveillance and follow-up with oncology.     Hyponatremia  This is likely secondary to renal failure.  Nephrology to follow,  ultrafiltrate with dialysis.       Diet: Combination Diet Gluten Free Diet; Renal Diet (dialysis)  Fluid restriction 1500 ML FLUID  Snacks/Supplements Adult: Margarita Enish Standard Oral Supplement; With Meals    DVT Prophylaxis: Heparin SQ  Alvarez Catheter: Not present  Lines: PRESENT      CVC Double Lumen Right Internal jugular Tunneled-Site Assessment: WDL      Cardiac Monitoring: None  Code Status: Full Code      Clinically Significant Risk Factors         # Hyponatremia: Lowest Na = 127 mmol/L in last 2 days, will monitor as appropriate      # Hypoalbuminemia: Lowest albumin = 1.8 g/dL at 5/18/2024  7:25 AM, will monitor as appropriate     # Hypertension: Noted on problem list         # Moderate Malnutrition: based on nutrition assessment      # Financial/Environmental Concerns:           Disposition Plan     Medically Ready for Discharge: Medically Ready for Discharge: 1-2 days pending Vascular Surgery and Nephrology plan established, safe disposition.       Bethanie Staley MD  Hospitalist Service  [facility]  Securely message with eReceipts (more info)  Text page via myTips Paging/Directory     Total time 50 minutes for today 5/23/2024 :  time consisted of the following, examination of patient, review of records including labs, imaging results, medications, interdisciplinary notes and  completing documentation and orders.  Care Management included counseling/discussion with Patient regarding current condition including renal fxn; AC and Coordination of Care time with Nursing  and Specialists, Nephrology regarding care plan, management and surveillance.   ______________________________________________________________________    Interval History   Denies pain, afebrile.  Nursing notes no wound issues.  Wound VAC changed yesterday.  Continues to make urine.    Physical Exam   Vital Signs: Temp: 98.4  F (36.9  C) Temp src: Axillary BP: (!) 183/79 Pulse: 71   Resp: 16 SpO2: 92 % O2 Device: None (Room air)    Weight: 142 lbs 13.73 oz    General Appearance: NAD, awake, calm, cooperative  Respiratory: Respirations nonlabored room air  GI: Soft, nontender.  PureWick, clear urine, rather concentrated  Neuro.  Gross motor tested, nonfocal,  Psych oriented, affect calm.  Skin, decubitus wound on buttock, dressed.  MSK 1+ pitting edema left leg, right stump with wound vac, dressed      Data     I have personally reviewed the following data over the past 24 hrs:    N/A  \   N/A   / N/A     127 (L) 95 (L) 43.1 (H) /  105 (H)   5.3 21 (L) 2.76 (H) \       Imaging results reviewed over the past 24 hrs:

## 2024-05-23 NOTE — PLAN OF CARE
Goal Outcome Evaluation    .Date/Time: 5/22/24 @ [id number]    Trauma/Ortho/Medical (Choose one) Ortho    Diagnosis: I and D R AKA  POD#:3  Mental Status: A and O x 4  Activity/dangle:Assist of 2 slider board. Stayed in bed the entire shift.  Diet: Renal and gluten free, 1500 ml fluid restriction  Pain: Managed with PRN Oxycodone  Alvarez/Voiding: External catheter  Tele/Restraints/Iso:N/A  02/LDA: On room air. PIV SL on the L forearm  D/C Date: To be determined  Other Info: Dialysis port R chest, wound vac

## 2024-05-24 ENCOUNTER — APPOINTMENT (OUTPATIENT)
Dept: PHYSICAL THERAPY | Facility: CLINIC | Age: 64
DRG: 464 | End: 2024-05-24
Attending: INTERNAL MEDICINE
Payer: COMMERCIAL

## 2024-05-24 ENCOUNTER — APPOINTMENT (OUTPATIENT)
Dept: OCCUPATIONAL THERAPY | Facility: CLINIC | Age: 64
DRG: 464 | End: 2024-05-24
Attending: INTERNAL MEDICINE
Payer: COMMERCIAL

## 2024-05-24 LAB
ANION GAP SERPL CALCULATED.3IONS-SCNC: 8 MMOL/L (ref 7–15)
BUN SERPL-MCNC: 54.2 MG/DL (ref 8–23)
CALCIUM SERPL-MCNC: 7.9 MG/DL (ref 8.8–10.2)
CHLORIDE SERPL-SCNC: 94 MMOL/L (ref 98–107)
CREAT SERPL-MCNC: 2.88 MG/DL (ref 0.51–0.95)
DEPRECATED HCO3 PLAS-SCNC: 26 MMOL/L (ref 22–29)
EGFRCR SERPLBLD CKD-EPI 2021: 17 ML/MIN/1.73M2
ERYTHROCYTE [DISTWIDTH] IN BLOOD BY AUTOMATED COUNT: 16.1 % (ref 10–15)
GLUCOSE SERPL-MCNC: 88 MG/DL (ref 70–99)
HCT VFR BLD AUTO: 28.8 % (ref 35–47)
HGB BLD-MCNC: 9.1 G/DL (ref 11.7–15.7)
MCH RBC QN AUTO: 30.2 PG (ref 26.5–33)
MCHC RBC AUTO-ENTMCNC: 31.6 G/DL (ref 31.5–36.5)
MCV RBC AUTO: 96 FL (ref 78–100)
PLATELET # BLD AUTO: 216 10E3/UL (ref 150–450)
POTASSIUM SERPL-SCNC: 5.6 MMOL/L (ref 3.4–5.3)
RBC # BLD AUTO: 3.01 10E6/UL (ref 3.8–5.2)
SODIUM SERPL-SCNC: 128 MMOL/L (ref 135–145)
WBC # BLD AUTO: 3.4 10E3/UL (ref 4–11)

## 2024-05-24 PROCEDURE — 250N000009 HC RX 250: Performed by: INTERNAL MEDICINE

## 2024-05-24 PROCEDURE — 250N000013 HC RX MED GY IP 250 OP 250 PS 637

## 2024-05-24 PROCEDURE — 250N000011 HC RX IP 250 OP 636

## 2024-05-24 PROCEDURE — G0463 HOSPITAL OUTPT CLINIC VISIT: HCPCS | Mod: 25

## 2024-05-24 PROCEDURE — 99231 SBSQ HOSP IP/OBS SF/LOW 25: CPT | Performed by: INTERNAL MEDICINE

## 2024-05-24 PROCEDURE — 120N000001 HC R&B MED SURG/OB

## 2024-05-24 PROCEDURE — 250N000013 HC RX MED GY IP 250 OP 250 PS 637: Performed by: INTERNAL MEDICINE

## 2024-05-24 PROCEDURE — 85027 COMPLETE CBC AUTOMATED: CPT

## 2024-05-24 PROCEDURE — 99231 SBSQ HOSP IP/OBS SF/LOW 25: CPT | Mod: 24

## 2024-05-24 PROCEDURE — 97535 SELF CARE MNGMENT TRAINING: CPT | Mod: GO

## 2024-05-24 PROCEDURE — 97606 NEG PRS WND THER DME>50 SQCM: CPT

## 2024-05-24 PROCEDURE — 80048 BASIC METABOLIC PNL TOTAL CA: CPT | Performed by: INTERNAL MEDICINE

## 2024-05-24 PROCEDURE — 36415 COLL VENOUS BLD VENIPUNCTURE: CPT | Performed by: INTERNAL MEDICINE

## 2024-05-24 PROCEDURE — 99232 SBSQ HOSP IP/OBS MODERATE 35: CPT | Performed by: INTERNAL MEDICINE

## 2024-05-24 PROCEDURE — 97530 THERAPEUTIC ACTIVITIES: CPT | Mod: GP | Performed by: PHYSICAL THERAPIST

## 2024-05-24 PROCEDURE — 36415 COLL VENOUS BLD VENIPUNCTURE: CPT

## 2024-05-24 PROCEDURE — 97110 THERAPEUTIC EXERCISES: CPT | Mod: GO

## 2024-05-24 PROCEDURE — 97110 THERAPEUTIC EXERCISES: CPT | Mod: GP | Performed by: PHYSICAL THERAPIST

## 2024-05-24 RX ADMIN — OXYCODONE HYDROCHLORIDE 5 MG: 5 TABLET ORAL at 10:20

## 2024-05-24 RX ADMIN — ROSUVASTATIN CALCIUM 10 MG: 10 TABLET, FILM COATED ORAL at 21:59

## 2024-05-24 RX ADMIN — SEVELAMER HYDROCHLORIDE 400 MG: 400 TABLET, FILM COATED ORAL at 17:42

## 2024-05-24 RX ADMIN — CETIRIZINE HYDROCHLORIDE 5 MG: 5 TABLET ORAL at 10:19

## 2024-05-24 RX ADMIN — BUMETANIDE 2 MG: 1 TABLET ORAL at 17:42

## 2024-05-24 RX ADMIN — OXYCODONE HYDROCHLORIDE 5 MG: 5 TABLET ORAL at 22:10

## 2024-05-24 RX ADMIN — ACETAMINOPHEN 650 MG: 325 TABLET, FILM COATED ORAL at 13:23

## 2024-05-24 RX ADMIN — NICOTINE 1 PATCH: 14 PATCH, EXTENDED RELEASE TRANSDERMAL at 10:21

## 2024-05-24 RX ADMIN — HEPARIN SODIUM 5000 UNITS: 5000 INJECTION, SOLUTION INTRAVENOUS; SUBCUTANEOUS at 13:25

## 2024-05-24 RX ADMIN — Medication 1 CAPSULE: at 21:59

## 2024-05-24 RX ADMIN — POLYETHYLENE GLYCOL 3350 17 G: 17 POWDER, FOR SOLUTION ORAL at 10:21

## 2024-05-24 RX ADMIN — Medication 1 CAPSULE: at 10:19

## 2024-05-24 RX ADMIN — SEVELAMER HYDROCHLORIDE 400 MG: 400 TABLET, FILM COATED ORAL at 14:01

## 2024-05-24 RX ADMIN — MICONAZOLE NITRATE: 2 POWDER TOPICAL at 10:24

## 2024-05-24 RX ADMIN — Medication 60 ML: at 10:29

## 2024-05-24 RX ADMIN — AMLODIPINE BESYLATE 10 MG: 10 TABLET ORAL at 10:19

## 2024-05-24 RX ADMIN — CLOPIDOGREL BISULFATE 75 MG: 75 TABLET ORAL at 10:19

## 2024-05-24 RX ADMIN — FLUTICASONE FUROATE AND VILANTEROL TRIFENATATE 1 PUFF: 200; 25 POWDER RESPIRATORY (INHALATION) at 10:21

## 2024-05-24 RX ADMIN — Medication 1 CAPSULE: at 13:24

## 2024-05-24 RX ADMIN — FAMOTIDINE 20 MG: 20 TABLET, FILM COATED ORAL at 17:42

## 2024-05-24 RX ADMIN — HEPARIN SODIUM 5000 UNITS: 5000 INJECTION, SOLUTION INTRAVENOUS; SUBCUTANEOUS at 21:59

## 2024-05-24 RX ADMIN — CARVEDILOL 12.5 MG: 12.5 TABLET, FILM COATED ORAL at 17:43

## 2024-05-24 RX ADMIN — OXYCODONE HYDROCHLORIDE 5 MG: 5 TABLET ORAL at 01:27

## 2024-05-24 RX ADMIN — CARVEDILOL 12.5 MG: 12.5 TABLET, FILM COATED ORAL at 10:19

## 2024-05-24 RX ADMIN — HEPARIN SODIUM 5000 UNITS: 5000 INJECTION, SOLUTION INTRAVENOUS; SUBCUTANEOUS at 04:45

## 2024-05-24 ASSESSMENT — ACTIVITIES OF DAILY LIVING (ADL)
ADLS_ACUITY_SCORE: 55
ADLS_ACUITY_SCORE: 51
ADLS_ACUITY_SCORE: 51
ADLS_ACUITY_SCORE: 54
ADLS_ACUITY_SCORE: 55
ADLS_ACUITY_SCORE: 51
ADLS_ACUITY_SCORE: 55
ADLS_ACUITY_SCORE: 51
ADLS_ACUITY_SCORE: 55
ADLS_ACUITY_SCORE: 51
ADLS_ACUITY_SCORE: 51
ADLS_ACUITY_SCORE: 54
ADLS_ACUITY_SCORE: 54
ADLS_ACUITY_SCORE: 55
ADLS_ACUITY_SCORE: 51
ADLS_ACUITY_SCORE: 50
ADLS_ACUITY_SCORE: 54
ADLS_ACUITY_SCORE: 51
ADLS_ACUITY_SCORE: 55
ADLS_ACUITY_SCORE: 55
ADLS_ACUITY_SCORE: 51
ADLS_ACUITY_SCORE: 55
ADLS_ACUITY_SCORE: 51

## 2024-05-24 NOTE — PROGRESS NOTES
Called and spoke to lab to let them know that the patient still has pending morning am labs that need to be drawn, they said they will send someone asap.

## 2024-05-24 NOTE — PROGRESS NOTES
[facility] Nurse Inpatient Assessment     Consulted for:  5/22/24: New consult for Right AKA/thigh    Summary: Right thigh wound vac changed 5/24. [facility] has signed off on coccyx. Dressing changes to be performed on Tu/F the week of Memorial Day due to the holiday, then will resume MWF dressing changes.     Patient History (according to provider note(s):      63 year old female with complex medical history which include anxiety, charcoaled Breonna tooth disease, GERD, hypertension, hyperlipidemia, lupus, severe peripheral artery disease, GERD, supraventricular tachycardia, asthma, was recently admitted in the hospital from 3/29/2024 to 4/22/2024 with right ischemic limb severe peripheral artery disease, ultimately require above-knee amputation the right side, course complicated by acute renal failure, rhabdomyolysis, sepsis, acute blood loss anemia, delirium, diarrhea and retroperitoneal hematoma will eventually discharged to acute rehab now coming from acute rehab because of wound dehiscence, necrosis mild erythema that needs debridement.     Assessment:      Areas visualized during today's visit: Focused:R AKA/Thigh    Negative pressure wound therapy applied to: Right AKA and Right Thigh    Last photo: 5/24/24   Right anterior Leg    Right posterior leg      Wound due to: Surgical Wound   Wound history/plan of care:    Surgical date: 5/20/24   Service following: Vascular  Date Negative Pressure Wound Therapy initiated: 5/20/24   Interventions in place: repositioning, pressure redistribution with device or dressing, specialty surface in use, moisture/incontinence management, offloading, and elevation  Is patient s nutritional status compromised? NO, but reports little appetite  If yes, what interventions are in place? Protein supplements  Reason for initiating vac therapy? Need for accelerated granulation tissue  Which?of?the?following?co-morbidities?apply? Immobility and PAD  If diabetic  is patient on a diabetic management program? N/A   Is osteomyelitis present in wound? no   If yes what treatments are in place? N/A      Wound base: right thigh: Mix of granuar tissue, muscle, adipose tissue and dried drainage. , Right medial: 70 % granulation tissue and muscle 30% slough     Palpation of the wound bed: normal       Drainage: moderate      Volume in cannister: <50ml     Last cannister change date: 5/24/24     Description of drainage: serosanguinous and bloody      Measurements (length x width x depth, in cm) Right Thigh: 15 x 7.3 x 0.7 Right medial AKA: 4 x 2.5 x 1.5cm       Tunneling N/A      Undermining up to 1 cm from 11 o'clock   Periwound skin: Edematous and Erythema- blanchable       Color: pink       Temperature: normal    Odor: none   Pain: moderate, during tape removal aching, sharp, and right-sided   Pain intervention prior to dressing change: oral oxycodone, soaking, and slow and gentle cares   Treatment goal: Heal , Drainage control, Infection control/prevention, Increase granulation, Maintain (prevention of deterioration), Pain control, Protection, and Promote epidermal migration  STATUS: improving   Supplies ordered: gathered, supplies stored on unit, and discussed with patient     Number of foam pieces removed from a wound (excluding foam for bridge) :  3 GranuFoam Black and 2 CleanseChoice Contact layer   Verified this matched the number of foam pieces applied last dressing change: Yes   Number of foam pieces packed into wound (excluding foam for bridge) :  3 GranuFoam Black and 2 Oil emulsion dressing            Treatment Plan:     Negative pressure wound therapy plan:  Wound location: Right thigh and right AKA   Change Days: Tu/F the week of 5/28 then  Mon/Wed/Fri by WOC RN    Supplies (including all accessories) used: large Black foam , Adaptic/Curity oil emulsion contact layer , and Cavilon no sting barrier film  Cleanse with MicroKlenz prior to replacing NPWT  Suction setting:  "-125   Methods used: Window paned all periwound skin with vac drape prior to applying sponge, Bridged trac pad off bony prominences, Spiral cut foam prior to packing into wound , and Cut foam in half to reduce profile    Staff RN to assess integrity of dressing and ensure suction is set at appropriate level every shift.   Date canister. Chart canister output every shift. Change cannister weekly and PRN if full/occluded     Remove foam dressing and replace with BID normal saline moist gauze dressing if:   -a dressing failure which cannot be repaired within 2 hours   -patient is discharging to home without a home pump   -patient is discharging to a facility outside the local area   -if a dressing is a \"Silver Foam\", remove before Radiation Therapy or MRI     The hospital VAC pump is not to be discharged with the patient.?Ensure to disconnect patient from machine prior to discharge. Then,    - If a home KCI VAC pump has been delivered, connect home cannister to dressing tubing then connect cannister to home pump and turn on machine    - If transferring to a nearby facility with a KCI vac, can disconnect and clamp tubing then cover end with glove so can be reconnected within 2 hours       Coccyx/Sacrum - newly healed area: Apply Sacral Mepilex every other day and prn if soiled or damaged     Pressure Injury Prevention (PIP) Plan:  If patient is declining pressure injury prevention interventions: Explore reason why and address patient's concerns, Educate on pressure injury risk and prevention intervention(s), If patient is still declining, document \"informed refusal\" , and Ensure Care team is aware ( provider, charge nurse, etc)  Mattress: Follow bed algorithm, reassess daily and order specialty mattress, if indicated.  HOB: Maintain at or below 30 degrees, unless contraindicated  Repositioning in bed: Every 1-2 hours , Left/right positioning; avoid supine, and Raise foot of bed prior to raising head of bed, to reduce " patient sliding down (shear)  Heels: Keep elevated off mattress and Pillows under calves  Protective Dressing: Sacral Mepilex for prevention (#[id number]),  especially for the agitated patient   Positioning Equipment:None  Chair positioning: Repositions self: patient to shift weight every 15 minutes, Assist patient to reposition hourly, and Do NOT use a donut for sitting (this increases pressure to smaller area and creates a higher potential for injury)    If patient has a buttock pressure injury, or high risk for PI use chair cushion or SPS.  Moisture Management: Perineal cleansing /protection: Follow Incontinence Protocol, Avoid brief in bed, Clean and dry skin folds with bathing , and Moisturize dry skin  Under Devices: Inspect skin under all medical devices during skin inspection , Ensure tubes are stabilized without tension, and Ensure patient is not lying on medical devices or equipment when repositioned  Ask provider to discontinue device when no longer needed.     Orders: Reviewed, Written, and Updated    RECOMMEND PRIMARY TEAM ORDER: None, at this time  Education provided: importance of repositioning, plan of care, wound progress, and Off-loading pressure  Discussed plan of care with: Patient and Nurse  WOC nurse follow-up plan: Monday/WednesdayFriday  Notify WOC if wound(s) deteriorate.  Nursing to notify the Provider(s) and re-consult the WOC Nurse if new skin concern.    DATA:     Current support surface: Standard  Standard Isoflex gel  Containment of urine/stool: Incontinent pad in bed  BMI: Body mass index is 26.51 kg/m .   Active diet order: Orders Placed This Encounter      Combination Diet Gluten Free Diet; Renal Diet (dialysis)     Output: I/O last 3 completed shifts:  In: 710 [P.O.:710]  Out: 550 [Urine:550]     Labs:   Recent Labs   Lab 05/21/24  0725   ALBUMIN 2.0*   HGB 9.3*   WBC 3.9*     Pressure injury risk assessment:   Sensory Perception: 4-->no impairment  Moisture: 3-->occasionally  moist  Activity: 2-->chairfast  Mobility: 2-->very limited  Nutrition: 3-->adequate  Friction and Shear: 2-->potential problem  Cesar Score: 16    Marleen Berger [facility]   Dept. Vocera- Contact [facility] Nurse Becki) via  Vocera   Dept. Office Number: [phone]]

## 2024-05-24 NOTE — PROGRESS NOTES
Ok for Shirley to just be on Plavix for anticoagulation going forward.     Discussed with Dr. Lozano.     Kaylee Liu, CNP

## 2024-05-24 NOTE — PLAN OF CARE
Goal Outcome Evaluation     Date/Time: 5/23/24 @ [id number]     Trauma/Ortho/Medical (Choose one) Ortho     Diagnosis: I and D R AKA  POD#:4  Mental Status: A and O x 4  Activity/dangle:Assist of 2 slider board. Stayed in bed the entire shift.  Diet: Renal and gluten free, 1500 ml fluid restriction  Pain: Managed with PRN Oxycodone  Alvarez/Voiding: External catheter  Tele/Restraints/Iso:N/A  02/LDA: On room air. PIV SL on the L forearm  D/C Date: To be determined  Other Info: Dialysis port R chest, wound vac, 0 output

## 2024-05-24 NOTE — PROGRESS NOTES
Care Management Follow Up    Length of Stay (days): 9    Expected Discharge Date: 05/24/2024     Concerns to be Addressed: discharge planning     Patient plan of care discussed at interdisciplinary rounds: Yes    Anticipated Discharge Disposition: Transitional Care     Anticipated Discharge Services:    Anticipated Discharge DME:      Patient/family educated on Medicare website which has current facility and service quality ratings: yes  Education Provided on the Discharge Plan:    Patient/Family in Agreement with the Plan: yes    Referrals Placed by CM/SW: Post Acute Facilities  Private pay costs discussed: Not applicable    Additional Information:  Message sent to [facility] earlier about referral. They have not completed review for placement yet but will update SW when they have an answer.Writer asked [facility] if they would consider taking patient back at discharge as [facility] is also being recommended.     Patient's sister Yue called for an update. Writer updated of the above.Yue would like to see her placed around here but understands if she returns to the [place]. Writer sent out additional referrals near [place] for review and will let patient know.    1540: Message from [facility] that patient is no longer acute rehab appropriate and TCU should be pursued. Writer updated patient and she continues to hope that [facility] will accept her.    SW to follow for discharge planning.     DAWN Brown

## 2024-05-24 NOTE — DISCHARGE INSTRUCTIONS
Negative pressure wound therapy plan:  Wound location: Right thigh and right AKA   Change Days: Tu/F the week of 5/28 then  Mon/Wed/Fri by WOC RN    Supplies (including all accessories) used: large Black foam , Adaptic/Curity oil emulsion contact layer , and Cavilon no sting barrier film  Cleanse with MicroKlenz prior to replacing NPWT  Suction setting: -125   Methods used: Window paned all periwound skin with vac drape prior to applying sponge, Bridged trac pad off bony prominences, Spiral cut foam prior to packing into wound , and Cut foam in half to reduce profile     Staff RN to assess integrity of dressing and ensure suction is set at appropriate level every shift.   Date canister. Chart canister output every shift. Change cannister weekly and PRN if full/occluded      Remove foam dressing and replace with BID normal saline moist gauze dressing if:   -a dressing failure which cannot be repaired within 2 hours

## 2024-05-24 NOTE — PROGRESS NOTES
Vascular Surgery Progress Note    Cr is slowly rising, continues to have good urine output, awaiting morning renal panel, please see Dr. Vaz's note on 5/23. Protein supplementation    Resting comfortably in bed with no acute issues.    Assessment/Plan:  63 year old female with extensive past vascular surgical history who is now s/p RLE AKA after acute thrombosis of CFA-AT bypass with ALI in early April of this year. Unfortunately AKA had areas of necrosis on anterior aspect, now s/p I&D, wound vac placement on 5/16.  VAC change in OR on 5/20      -able to discharge to TCU vs ARU from Vascular Surgery standpoint  -follow-up scheduled  -WOC to change wound vac dressing today, MWF    Kaylee Liu, CNP

## 2024-05-24 NOTE — PROGRESS NOTES
[facility]     Renal Progress Note       SHORTHAND KEY FOR MY NOTES:  c = with, s = without, p = after, a = before, x = except, asx = asymptomatic, tx = transplant or treatment, sx = symptoms or symptomatic, cx = canceled or culture, rxn = reaction, yday = yesterday, nl = normal, abx = antibiotics, fxn = function, dx = diagnosis, dz = disease, m/h = melena/hematochezia, c/d/l/ha = cramping/dizziness/lightheadedness/headache, d/c = discharge or diarrhea/constipation, f/c/n/v = fevers/chills/nausea/vomiting, cp/sob = chest pain/shortness of breath, tbv = total body volume, rxn = reaction, tdc = tunneled dialysis catheter, pta = prior to admission, hd = hemodialysis, pd = peritoneal dialysis, hhd = home hemodialysis, edw = estimated dry wt         Assessment/Plan:     1.   LIMA.  Pt has been HD-dependent but is making more urine daily.  Her last dialysis was 5/18.  Cr has been trending up but no labs yet today.  No significant uremic sx.  TBV is fine today.  She does not need dialysis today.  A.  Check stat BMP.   B.  Ok to remove R arm limb alert wristband for now since she can't get labs drawn from the L hand easily.  C.  Check daily labs.  D.  Will assess each day for dialysis needs.    Case d/w Dr. Maradiaga and MARISSA Melgar.        Interval History:     Pt is frustrated that labs have not been drawn.  She is urinating ok via PureWick.  No issues c breathing.          Medications and Allergies:     Current Facility-Administered Medications   Medication Dose Route Frequency Provider Last Rate Last Admin    - MEDICATION INSTRUCTIONS for Dialysis Patients -   Does not apply See Admin Instructions Lvey Byers MD        amLODIPine (NORVASC) tablet 10 mg  10 mg Oral Daily Manuel Robledo MD   10 mg at 05/24/24 1019    bumetanide (BUMEX) tablet 2 mg  2 mg Oral Daily Nereida Vaz MD   2 mg at 05/23/24 8263    carvedilol (COREG) tablet 12.5 mg  12.5 mg Oral BID w/meals Nereida Vaz MD    12.5 mg at 05/24/24 1019    cetirizine (zyrTEC) tablet 5 mg  5 mg Oral Daily Manuel Robledo MD   5 mg at 05/24/24 1019    clopidogrel (PLAVIX) tablet 75 mg  75 mg Oral Daily Gala Lo MD   75 mg at 05/24/24 1019    famotidine (PEPCID) tablet 20 mg  20 mg Oral Q48H Gala Lo MD   20 mg at 05/22/24 1813    Or    famotidine (PEPCID) injection 20 mg  20 mg Intravenous Q48H Gala Lo MD        fluticasone-vilanterol (BREO ELLIPTA) 200-25 MCG/ACT inhaler 1 puff  1 puff Inhalation Daily Manuel Robledo MD   1 puff at 05/24/24 1021    gabapentin (NEURONTIN) capsule 200 mg  200 mg Oral Once per day on Tuesday Thursday Saturday Manuel Robledo MD   200 mg at 05/23/24 2102    heparin ANTICOAGULANT injection 5,000 Units  5,000 Units Subcutaneous Q8H Gala Lo MD   5,000 Units at 05/24/24 1325    lactobacillus rhamnosus (GG) (CULTURELL) capsule 1 capsule  1 capsule Oral BID Manuel Robledo MD   1 capsule at 05/24/24 1019    miconazole (MICATIN) 2 % powder   Topical BID Manuel Robledo MD   Given at 05/24/24 1024    multivitamin RENAL (TRIPHROCAPS) capsule 1 capsule  1 capsule Oral Daily Manuel Robledo MD   1 capsule at 05/24/24 1324    nicotine (NICODERM CQ) 14 MG/24HR 24 hr patch 1 patch  1 patch Transdermal Daily Manuel Robledo MD   1 patch at 05/24/24 1021    polyethylene glycol (MIRALAX) Packet 17 g  17 g Oral Daily Gala Lo MD   17 g at 05/24/24 1021    rosuvastatin (CRESTOR) tablet 10 mg  10 mg Oral At Bedtime Manuel Robledo MD   10 mg at 05/23/24 2102    [Held by provider] senna-docusate (SENOKOT-S/PERICOLACE) 8.6-50 MG per tablet 1-2 tablet  1-2 tablet Oral BID Manuel Robledo MD        sevelamer HCl (RENAGEL) tablet 400 mg  400 mg Oral TID w/meals Nereida Vaz MD   400 mg at 05/24/24 1401    silver sulfADIAZINE (SILVADENE) 1 % cream   Topical Daily Manuel Robledo MD        sodium chloride (PF) 0.9% PF flush 3 mL  3 mL  Intracatheter Q8H Gala Lo MD   3 mL at 05/24/24 1029    wound support modular (EXPEDITE) bottle 60 mL  60 mL Oral Daily Levy Byers MD   60 mL at 05/24/24 1029     Allergies   Allergen Reactions    Pantoprazole      Protonix caused diffuse edema    Chantix [Varenicline]      Terrible dreams    Contrast Dye Swelling     Patient reports facial and throat swelling with prior CT contrast.    Penicillins Itching and Rash          Physical Exam:     Vitals were reviewed     , Blood pressure (!) 155/67, pulse 64, temperature 98.4  F (36.9  C), temperature source Oral, resp. rate 18, weight 63.6 kg (140 lb 3.4 oz), SpO2 97%, not currently breastfeeding.  Wt Readings from Last 3 Encounters:   05/24/24 63.6 kg (140 lb 3.4 oz)   05/14/24 57 kg (125 lb 10.6 oz)   04/22/24 50.5 kg (111 lb 5.3 oz)     Intake/Output Summary (Last 24 hours) at 5/24/2024 1621  Last data filed at 5/24/2024 0536  Gross per 24 hour   Intake 360 ml   Output 550 ml   Net -190 ml     GENERAL APPEARANCE: pleasant, NAD, alert  RESP: CTA B c good efforts  CV: RRR, nl S1/S2   ABDOMEN: o/s/nt/nd  EXTREMITIES/SKIN:  R AKA; tr LLE edema  ACCESS:  RIJ TDC          Data:     CBC RESULTS:     Recent Labs   Lab 05/21/24  0725 05/20/24  0820   WBC 3.9* 4.2   RBC 3.07* 3.40*   HGB 9.3* 10.3*   HCT 29.3* 33.7*    194     Basic Metabolic Panel:  Recent Labs   Lab 05/23/24  0813 05/22/24  0754 05/21/24  0725 05/20/24  0820 05/19/24  0744 05/18/24  0725   * 128* 129* 133* 132* 129*   POTASSIUM 5.3 4.9 4.8 4.6 4.2 5.2   CHLORIDE 95* 93* 95* 98 99 98   CO2 21* 23 24 26 27 20*   BUN 43.1* 37.1* 33.1* 24.0* 17.2 26.3*   CR 2.76* 2.51* 2.54* 2.22* 1.95* 2.65*   * 85 123* 74 71 73  73   SONIA 7.6* 7.5* 7.5* 7.7* 7.5* 7.3*     INRNo lab results found in last 7 days.   Attestation:   I have reviewed today's relevant vital signs, notes, medications, labs and imaging.    Hardy Falcon MD  [facility]phrology  [phone]

## 2024-05-24 NOTE — PROGRESS NOTES
Called and spoke to lab asking when someone will come to draw labs and informed I called at 1320 and still no one has come, they said ok.

## 2024-05-24 NOTE — PLAN OF CARE
Diagnosis: R AKA stump I&D  POD#: 3  Mental Status: A&O x4  Activity/dangle: up w/ A2 slider board,  had PT today, declined to be OOB this shift, changes position independently  Diet: renal and gluten free, 1500 fluid restriction  Pain: Tylenol, declined Oxycodone this shift  Alvarez/Voiding: intermittent purewick use  02/LDA:  room air, PIV SL  D/C Date: pending  Other Info: Dialysis port R chest, Wound vac

## 2024-05-24 NOTE — PROGRESS NOTES
[facility]    Medicine Progress Note - Hospitalist Service    Date of Admission:  5/15/2024    Assessment & Plan   Shirley Hendricks is a 63 year old female with complex medical history which includes anxiety, Charcot- Breonna Tooth disease, GERD, hypertension, hyperlipidemia, lupus, severe peripheral artery disease, GERD, supraventricular tachycardia, asthma, who was recently admitted in the hospital from 3/29/2024 to 4/22/2024 with right ischemic limb, severe peripheral artery disease, ultimately underwent above-knee amputation on the right side, course complicated by acute renal failure, rhabdomyolysis, sepsis, acute blood loss anemia, delirium, diarrhea and retroperitoneal hematoma. She was eventually discharged to acute rehab, now presenting because of wound dehiscence and necrosis that needs debridement. Underwent debridement of the stump on 5/16, cultures thus far negative     R AKA c/b wound dehiscence  S/p wound vac placement  S/p washout 5/20/24  No evidence of a bacterial SSTI of the wound at this time. Wound was apparently necrotic but not overtly infected. Surgical cultures are negative thus far and patient doing well, continues to remain off antibiotics. Wound vac is in place, patient notes pain deloris when the stump is being evaluated.   Staph capitis from surgical culture is growing. This is a coag negative staph which has low pathogenicity, often a contaminant and unlikely to have caused wound dehiscence.  if no other organisms grow, prior hospitalist states would still opt for no antibiotics at this point.   Planned for repeat washout on 5/20.  Resumed plavix and crestor. Holding SQH. .  Defer resumption of AC to surgeons.  -Paged vascular surgery, needs assessment of vascular surgery regarding indications for DVT PXP, i.e. currently SQH every 8 hours when she is discharged to TCU.  Discussion with patient it appears that PTA Xarelto was prior to amputation.     Acute renal  failure  Occurred during recent hospital course likely 2/2 rhabdomyolysis and ischemic injury. Has been dialysis depdent on Tuesday Thursday, Saturday. However, has been having increased urination and possible that she is having at least some renal recovry.  See nephrology note 5/20/2024 making urine.  Per nephrology hold dialysis 5/21/2024.  CR 5/21/2024 CR increased to 2.54, yesterday 2.22, continues to make urine.  -5/22/2024.  See nephrology note.  Continues to make urine.  CR stable 2.5 range last 3 days.  Hold HD.  Continue to monitor CR.  5/23/2024 serial increase in CR since hold of HD, currently 2.76, with HD 1.9-2.2.  Continues to make urine.  Currently HD on hold.  See Nephrology Note 5/23/2024 regarding need for definitive plan regarding dialysis prior to discharge  A/W BMP      Anemia of chronic kidney disease  Patient has history of acute blood loss anemia, with retroperitoneal hematoma and surgeries.  Hemoglobin stable around 10 at this time.  No active bleeding at this time  -Recheck hemoglobin in a.m     Hypertension  Hyperlipidemia  History of coronary artery disease  No active chest pain at this time, blood pressure slightly on the higher side.  She is on amlodipine 10 mg daily, Coreg 6.25 mg twice daily, as needed hydralazine  PTA Bumex 4 mg 3-4 times a week on hold.     Possible celiac disease  History of loose stools  Patient is noncompliant with gluten-free diet.  Mild abdominal tenderness on exam.  At some point need to follow-up with the GI and counseling.     Asthma  GERD  No acute exacerbation of asthma, continue PTA Breo Ellipta   Continue PTA Pepcid 20 mg daily.     History of diabetes mellitus type 2  Last hemoglobin A1c was 5.2  Her Jardiance was discontinued.  She has had low blood sugars going down to 50s/60s range.  Placed on hypoglycemia protocol.  Currently on diet control     History of B-cell lymphoma  Followed by [place] [facility]  Continue outpatient surveillance and  follow-up with oncology.     Hyponatremia  This is likely secondary to renal failure.  Nephrology to follow,  ultrafiltrate with dialysis.          Diet: Combination Diet Gluten Free Diet; Renal Diet (dialysis)  Fluid restriction 1500 ML FLUID  Snacks/Supplements Adult: Margarita Garrison Standard Oral Supplement; With Meals    DVT Prophylaxis: Heparin SQ  Alvarez Catheter: Not present  Lines: PRESENT      CVC Double Lumen Right Internal jugular Tunneled-Site Assessment: WDL      Cardiac Monitoring: None  Code Status: Full Code      Clinically Significant Risk Factors         # Hyponatremia: Lowest Na = 127 mmol/L in last 2 days, will monitor as appropriate      # Hypoalbuminemia: Lowest albumin = 1.8 g/dL at 5/18/2024  7:25 AM, will monitor as appropriate     # Hypertension: Noted on problem list         # Moderate Malnutrition: based on nutrition assessment    # Financial/Environmental Concerns:           Disposition Plan     Medically Ready for Discharge: Anticipated Tomorrow if creatinine stable/better         Juan Alberto Maradiaga MD  Hospitalist Service  [facility]  Securely message with [employer] (more info)  Text page via writewith Paging/Directory   The above note was dictated using voice recognition software. Although reviewed after completion, some word and grammatical error may remain . Please contact the author for any clarifications.  ______________________________________________________________________    Interval History   Doing okay.  Complains of pain post dressing change.  2 trials of blood draw this morning that failed    Physical Exam   BP (!) 155/67 (BP Location: Left arm)   Pulse 64   Temp 98.4  F (36.9  C) (Oral)   Resp 18   Wt 63.6 kg (140 lb 3.4 oz)   LMP  (LMP Unknown)   SpO2 97%   BMI 26.51 kg/m    Neck- supple  CVS- I+II+ no m/r/g  RS- CTAB  Abdo- soft, no tenderness . No g/r/r   MSK-as per vascular surgery      Medical Decision Making       40 MINUTES SPENT BY ME on the date  of service doing chart review, history, exam, documentation & further activities per the note.      Data   ------------------------- PAST 24 HR DATA REVIEWED -----------------------------------------------        Imaging results reviewed over the past 24 hrs:   No results found for this or any previous visit (from the past 24 hour(s)).

## 2024-05-24 NOTE — PLAN OF CARE
A&OX4.  Dressing changed today by wound nurse.  Wound Vac patent.  Taking prn oxycodone and tylenol for pain.  Incontinent of urine, purewick in place.  BM X2 today using bedpan.  Patient refused to get out of bed today and stated that she will sit up in recliner chair tomorrow.  Left foot 3+ edema, elevated on pillows.  Mepilex covering buttocks/sacrum, newly healed area that's red/pink.  Dialysis port right chest.

## 2024-05-25 LAB
ALBUMIN SERPL BCG-MCNC: 2.3 G/DL (ref 3.5–5.2)
ANION GAP SERPL CALCULATED.3IONS-SCNC: 12 MMOL/L (ref 7–15)
BUN SERPL-MCNC: 57.2 MG/DL (ref 8–23)
CALCIUM SERPL-MCNC: 8.1 MG/DL (ref 8.8–10.2)
CHLORIDE SERPL-SCNC: 92 MMOL/L (ref 98–107)
CREAT SERPL-MCNC: 2.92 MG/DL (ref 0.51–0.95)
DEPRECATED HCO3 PLAS-SCNC: 22 MMOL/L (ref 22–29)
EGFRCR SERPLBLD CKD-EPI 2021: 17 ML/MIN/1.73M2
GLUCOSE SERPL-MCNC: 102 MG/DL (ref 70–99)
PHOSPHATE SERPL-MCNC: 5.9 MG/DL (ref 2.5–4.5)
POTASSIUM SERPL-SCNC: 5.5 MMOL/L (ref 3.4–5.3)
SODIUM SERPL-SCNC: 126 MMOL/L (ref 135–145)

## 2024-05-25 PROCEDURE — 99232 SBSQ HOSP IP/OBS MODERATE 35: CPT | Performed by: STUDENT IN AN ORGANIZED HEALTH CARE EDUCATION/TRAINING PROGRAM

## 2024-05-25 PROCEDURE — 120N000001 HC R&B MED SURG/OB

## 2024-05-25 PROCEDURE — 250N000013 HC RX MED GY IP 250 OP 250 PS 637

## 2024-05-25 PROCEDURE — 80069 RENAL FUNCTION PANEL: CPT | Performed by: INTERNAL MEDICINE

## 2024-05-25 PROCEDURE — 84100 ASSAY OF PHOSPHORUS: CPT | Performed by: INTERNAL MEDICINE

## 2024-05-25 PROCEDURE — 250N000011 HC RX IP 250 OP 636

## 2024-05-25 PROCEDURE — 36415 COLL VENOUS BLD VENIPUNCTURE: CPT | Performed by: INTERNAL MEDICINE

## 2024-05-25 PROCEDURE — 250N000013 HC RX MED GY IP 250 OP 250 PS 637: Performed by: INTERNAL MEDICINE

## 2024-05-25 PROCEDURE — 99232 SBSQ HOSP IP/OBS MODERATE 35: CPT | Performed by: HOSPITALIST

## 2024-05-25 RX ADMIN — BUMETANIDE 2 MG: 1 TABLET ORAL at 18:28

## 2024-05-25 RX ADMIN — SEVELAMER HYDROCHLORIDE 400 MG: 400 TABLET, FILM COATED ORAL at 13:26

## 2024-05-25 RX ADMIN — CARVEDILOL 12.5 MG: 12.5 TABLET, FILM COATED ORAL at 09:14

## 2024-05-25 RX ADMIN — AMLODIPINE BESYLATE 10 MG: 10 TABLET ORAL at 09:14

## 2024-05-25 RX ADMIN — OXYCODONE HYDROCHLORIDE 5 MG: 5 TABLET ORAL at 21:27

## 2024-05-25 RX ADMIN — Medication 1 CAPSULE: at 09:14

## 2024-05-25 RX ADMIN — CARVEDILOL 12.5 MG: 12.5 TABLET, FILM COATED ORAL at 18:28

## 2024-05-25 RX ADMIN — SEVELAMER HYDROCHLORIDE 400 MG: 400 TABLET, FILM COATED ORAL at 18:29

## 2024-05-25 RX ADMIN — MICONAZOLE NITRATE: 2 POWDER TOPICAL at 16:01

## 2024-05-25 RX ADMIN — Medication 60 ML: at 09:17

## 2024-05-25 RX ADMIN — Medication 1 CAPSULE: at 21:27

## 2024-05-25 RX ADMIN — SEVELAMER HYDROCHLORIDE 400 MG: 400 TABLET, FILM COATED ORAL at 09:14

## 2024-05-25 RX ADMIN — ROSUVASTATIN CALCIUM 10 MG: 10 TABLET, FILM COATED ORAL at 21:27

## 2024-05-25 RX ADMIN — MICONAZOLE NITRATE: 2 POWDER TOPICAL at 09:17

## 2024-05-25 RX ADMIN — NICOTINE 1 PATCH: 14 PATCH, EXTENDED RELEASE TRANSDERMAL at 09:14

## 2024-05-25 RX ADMIN — GABAPENTIN 200 MG: 100 CAPSULE ORAL at 21:27

## 2024-05-25 RX ADMIN — CETIRIZINE HYDROCHLORIDE 5 MG: 5 TABLET ORAL at 09:14

## 2024-05-25 RX ADMIN — HEPARIN SODIUM 5000 UNITS: 5000 INJECTION, SOLUTION INTRAVENOUS; SUBCUTANEOUS at 21:28

## 2024-05-25 RX ADMIN — HEPARIN SODIUM 5000 UNITS: 5000 INJECTION, SOLUTION INTRAVENOUS; SUBCUTANEOUS at 13:26

## 2024-05-25 RX ADMIN — Medication 1 CAPSULE: at 13:26

## 2024-05-25 RX ADMIN — FLUTICASONE FUROATE AND VILANTEROL TRIFENATATE 1 PUFF: 200; 25 POWDER RESPIRATORY (INHALATION) at 09:28

## 2024-05-25 RX ADMIN — CLOPIDOGREL BISULFATE 75 MG: 75 TABLET ORAL at 09:14

## 2024-05-25 RX ADMIN — HEPARIN SODIUM 5000 UNITS: 5000 INJECTION, SOLUTION INTRAVENOUS; SUBCUTANEOUS at 04:11

## 2024-05-25 RX ADMIN — ONDANSETRON 4 MG: 4 TABLET, ORALLY DISINTEGRATING ORAL at 15:35

## 2024-05-25 ASSESSMENT — ACTIVITIES OF DAILY LIVING (ADL)
ADLS_ACUITY_SCORE: 50
ADLS_ACUITY_SCORE: 55
ADLS_ACUITY_SCORE: 51
ADLS_ACUITY_SCORE: 50
ADLS_ACUITY_SCORE: 55
ADLS_ACUITY_SCORE: 50
ADLS_ACUITY_SCORE: 54
ADLS_ACUITY_SCORE: 50
ADLS_ACUITY_SCORE: 51
ADLS_ACUITY_SCORE: 51
ADLS_ACUITY_SCORE: 55
ADLS_ACUITY_SCORE: 50
ADLS_ACUITY_SCORE: 51
ADLS_ACUITY_SCORE: 50
ADLS_ACUITY_SCORE: 55
ADLS_ACUITY_SCORE: 50
ADLS_ACUITY_SCORE: 50

## 2024-05-25 NOTE — PROGRESS NOTES
Vascular Surgery Progress Note  05/25/2024       Subjective:  Resting comfortably in bed does report new issue is lower back pain however this is above her pressure sore.  Otherwise doing well remains off dialysis.      Objective:  Temp:  [98.4  F (36.9  C)-99.6  F (37.6  C)] 99.6  F (37.6  C)  Pulse:  [64-74] 71  Resp:  [16-18] 16  BP: (141-163)/(66-70) 158/70  SpO2:  [92 %-97 %] 92 %    I/O last 3 completed shifts:  In: 1240 [P.O.:1240]  Out: 500 [Urine:500]      Gen: Awake, alert, NAD  Resp: NLB on RA  Incision: Wound VAC holding seal on RLE    Labs:  Recent Labs   Lab 05/24/24  1648 05/21/24  0725 05/20/24  0820   WBC 3.4* 3.9* 4.2   HGB 9.1* 9.3* 10.3*    181 194       Recent Labs   Lab 05/24/24  1816 05/23/24  0813 05/22/24  0754 05/21/24  0725 05/20/24  0820   * 127* 128* 129* 133*   POTASSIUM 5.6* 5.3 4.9 4.8 4.6   CHLORIDE 94* 95* 93* 95* 98   CO2 26 21* 23 24 26   BUN 54.2* 43.1* 37.1* 33.1* 24.0*   CR 2.88* 2.76* 2.51* 2.54* 2.22*   GLC 88 105* 85 123* 74   SONIA 7.9* 7.6* 7.5* 7.5* 7.7*   PHOS  --  5.5*  --  6.1* 4.9*     Labs not back yet today, CR pending.     Imaging:  No new imaging reviewed     Assessment/Plan:   63 year old female with extensive past vascular surgical history who is now s/p RLE AKA after acute thrombosis of CFA-AT bypass with ALI in early April of this year. Unfortunately AKA had areas of necrosis on anterior aspect, now s/p I&D, wound vac placement on 5/16.  VAC change in OR on 5/20      -able to discharge to TCU vs ARU from Vascular Surgery standpoint  -If patient is able to definitively not require dialysis in the future strongly recommend removal of tunneled catheter prior to discharge from hospital to decrease infection risk  -follow-up scheduled  -WOC to change wound vac dressing.      Discussed with vascular surgery staff, who is in agreement with the above.  - - - - - - - - - - - - - - - - - -  Bj Lo, PGY-3  Vascular Surgery Resident    STAFF: Overall  doing well.  Complaining of some back pain this morning.   Wound VAC photographs reviewed from yesterday--very clean wound bed.  Will eventually need split-thickness skin graft and discussed with patient.     Taking appropriate oral nutritional supplements to aid in healing.     LIMA-CKD.  Very good urine output.  Has not required dialysis for a week.  Needs to have a right jugular tunneled catheter removed prior to discharge but will defer to nephrology.     Encourage up in recliner.  Actually she does better in the wheelchair and would spend more time in the wheelchair.       Chadwick Lozano MD

## 2024-05-25 NOTE — PROGRESS NOTES
Renal Medicine Progress Note            Assessment/Plan:     Assessment:      LIMA  Has been HD dependent, but now with some renal recovery. Last HD 5/18. UOP improving. No uremic symptoms. Cr continues to increase daily, has not reached a plateau so unclear what her residual renal function is. Would like to see plateau in renal function prior to removing catheter, not completely out of the woods of needing further dialysis. No acute indications for dialysis right now.   - daily renal panel   - I/os   - monitor symptoms   - do not yet remove catheter     Hyperkalemia   K 5.5. rising since stopping dialysis. On renal diet, but reports eating fries every morning. Discussed low K diet, changed diet order. Can hold off on lokelma for now.    R AKA wound dehiscence   S/p washout and wound vac   Vascular surgery following. Seems to be healing well.     Plan/Recs:  1) no acute indication for HD   2) Cr continues to worsen daily, awaiting plateau   3) please keep catheter in place until clear that she will not need dialysis   4) low K diet       Kiana Joseph MD   [facility]  Office: [phone]          Interval History:       No acute events overnight   UOP picking up, already 500 ml since midnight   Good appetite, no n/v/d  Denies fatigue, confusion, dysgeusia   Daughter updated on phone           Medications and Allergies:     Current Facility-Administered Medications   Medication Dose Route Frequency Provider Last Rate Last Admin    - MEDICATION INSTRUCTIONS for Dialysis Patients -   Does not apply See Admin Instructions Levy Byers MD        amLODIPine (NORVASC) tablet 10 mg  10 mg Oral Daily Manuel Robledo MD   10 mg at 05/25/24 0914    bumetanide (BUMEX) tablet 2 mg  2 mg Oral Daily Nereida Vaz MD   2 mg at 05/24/24 1742    carvedilol (COREG) tablet 12.5 mg  12.5 mg Oral BID w/meals Nereida Vaz MD   12.5 mg at 05/25/24 0914    cetirizine (zyrTEC) tablet 5 mg  5 mg Oral Daily Venkat  Manuel Raygoza MD   5 mg at 05/25/24 0914    clopidogrel (PLAVIX) tablet 75 mg  75 mg Oral Daily Gala Lo MD   75 mg at 05/25/24 0914    famotidine (PEPCID) tablet 20 mg  20 mg Oral Q48H Gala Lo MD   20 mg at 05/24/24 1742    Or    famotidine (PEPCID) injection 20 mg  20 mg Intravenous Q48H Gala Lo MD        fluticasone-vilanterol (BREO ELLIPTA) 200-25 MCG/ACT inhaler 1 puff  1 puff Inhalation Daily Manuel Robledo MD   1 puff at 05/25/24 0928    gabapentin (NEURONTIN) capsule 200 mg  200 mg Oral Once per day on Tuesday Thursday Saturday Manuel Robledo MD   200 mg at 05/23/24 2102    heparin ANTICOAGULANT injection 5,000 Units  5,000 Units Subcutaneous Q8H Gala Lo MD   5,000 Units at 05/25/24 1326    lactobacillus rhamnosus (GG) (CULTURELL) capsule 1 capsule  1 capsule Oral BID Manuel Robledo MD   1 capsule at 05/25/24 0914    miconazole (MICATIN) 2 % powder   Topical BID Manuel Robledo MD   Given at 05/25/24 0917    multivitamin RENAL (TRIPHROCAPS) capsule 1 capsule  1 capsule Oral Daily Manuel Robledo MD   1 capsule at 05/25/24 1326    nicotine (NICODERM CQ) 14 MG/24HR 24 hr patch 1 patch  1 patch Transdermal Daily Manuel Robledo MD   1 patch at 05/25/24 0914    polyethylene glycol (MIRALAX) Packet 17 g  17 g Oral Daily Gala Lo MD   17 g at 05/24/24 1021    rosuvastatin (CRESTOR) tablet 10 mg  10 mg Oral At Bedtime Manuel Robledo MD   10 mg at 05/24/24 1911    [Held by provider] senna-docusate (SENOKOT-S/PERICOLACE) 8.6-50 MG per tablet 1-2 tablet  1-2 tablet Oral BID Manuel Robledo MD        sevelamer HCl (RENAGEL) tablet 400 mg  400 mg Oral TID w/meals Nereida Vaz MD   400 mg at 05/25/24 1326    silver sulfADIAZINE (SILVADENE) 1 % cream   Topical Daily Manuel Robledo MD        sodium chloride (PF) 0.9% PF flush 3 mL  3 mL Intracatheter Q8H Gala Lo MD   3 mL at 05/24/24 1029    wound support modular  (EXPEDITE) bottle 60 mL  60 mL Oral Daily Levy Byers MD   60 mL at 05/25/24 0917        Allergies   Allergen Reactions    Pantoprazole      Protonix caused diffuse edema    Chantix [Varenicline]      Terrible dreams    Contrast Dye Swelling     Patient reports facial and throat swelling with prior CT contrast.    Penicillins Itching and Rash            Physical Exam:   Vitals were reviewed  BP (!) 158/70 (BP Location: Left arm)   Pulse 71   Temp 99.6  F (37.6  C) (Oral)   Resp 16   Wt 63.6 kg (140 lb 3.4 oz)   LMP  (LMP Unknown)   SpO2 92%   BMI 26.51 kg/m      Wt Readings from Last 3 Encounters:   05/25/24 63.6 kg (140 lb 3.4 oz)   05/14/24 57 kg (125 lb 10.6 oz)   04/22/24 50.5 kg (111 lb 5.3 oz)       Intake/Output Summary (Last 24 hours) at 5/25/2024 1424  Last data filed at 5/25/2024 0930  Gross per 24 hour   Intake 940 ml   Output 500 ml   Net 440 ml       GENERAL APPEARANCE: NAD, frail appearing  HEENT: normocephalic, MMM  RESP: clear anteriorly  CV: RRR  EXTREMITIES/SKIN: L foot edema, R BKA  NEURO:  alert, oriented, normal speech            Data:     BMP  Recent Labs   Lab 05/25/24  0900 05/24/24  1816 05/23/24  0813 05/22/24  0754   * 128* 127* 128*   POTASSIUM 5.5* 5.6* 5.3 4.9   CHLORIDE 92* 94* 95* 93*   SONIA 8.1* 7.9* 7.6* 7.5*   CO2 22 26 21* 23   BUN 57.2* 54.2* 43.1* 37.1*   CR 2.92* 2.88* 2.76* 2.51*   * 88 105* 85     CBC  Recent Labs   Lab 05/24/24  1648 05/21/24  0725 05/20/24  0820   WBC 3.4* 3.9* 4.2   HGB 9.1* 9.3* 10.3*   HCT 28.8* 29.3* 33.7*   MCV 96 95 99    181 194     Lab Results   Component Value Date    AST 13 05/16/2024    ALT 9 05/16/2024    ALKPHOS 74 05/16/2024    BILITOTAL 0.2 05/16/2024    BILICONJ 0.0 01/23/2004     Lab Results   Component Value Date    INR 1.31 (H) 04/11/2024     Color Urine (no units)   Date Value   05/03/2024 Orange (A)   02/04/2019 Yellow     Appearance Urine (no units)   Date Value   05/03/2024 Cloudy (A)   02/04/2019  Clear     Glucose Urine (mg/dL)   Date Value   05/03/2024 Negative   02/04/2019 Negative     Bilirubin Urine (no units)   Date Value   05/03/2024 Negative   02/04/2019 Negative     Ketones Urine (mg/dL)   Date Value   05/03/2024 Negative   02/04/2019 Negative     Specific Gravity Urine (no units)   Date Value   05/03/2024 1.018   02/04/2019 1.010     pH Urine   Date Value   05/03/2024 6.0   02/04/2019 6.0 pH     Protein Albumin Urine (mg/dL)   Date Value   05/03/2024 200 (A)   02/04/2019 Negative     Urobilinogen Urine (EU/dL)   Date Value   02/04/2019 0.2     Nitrite Urine (no units)   Date Value   05/03/2024 Negative   02/04/2019 Negative     Leukocyte Esterase Urine (no units)   Date Value   05/03/2024 Large (A)   02/04/2019 Negative         Attestation:  I have reviewed today's vital signs, notes, medications, labs and imaging.    Kiana Joseph MD  [facility]  Office: [phone]

## 2024-05-25 NOTE — PLAN OF CARE
A&OX4.  Up in wheelchair this afternoon with assist of 2 and slider board.  Purewick in place for incontinence.  Wound Vac patent.  Mepilex changed on healing coccyx wound, turned and repositioned side to side frequently.  Discharge pending TCU.

## 2024-05-25 NOTE — PROGRESS NOTES
[facility]    Medicine Progress Note - Hospitalist Service    Date of Admission:  5/15/2024    Assessment & Plan   Shirley Hendricks is a 63 year old female with complex medical history which includes anxiety, Charcot- Breonna Tooth disease, GERD, hypertension, hyperlipidemia, lupus, severe peripheral artery disease, GERD, supraventricular tachycardia, asthma, who was recently admitted in the hospital from 3/29/2024 to 4/22/2024 with right ischemic limb, severe peripheral artery disease, ultimately underwent above-knee amputation on the right side, course complicated by acute renal failure, rhabdomyolysis, sepsis, acute blood loss anemia, delirium, diarrhea and retroperitoneal hematoma. She was eventually discharged to acute rehab, now presenting because of wound dehiscence and necrosis that needs debridement. Underwent debridement of the stump on 5/16, cultures thus far negative     R AKA c/b wound dehiscence  S/p wound vac placement  S/p washout 5/20/24  No evidence of a bacterial SSTI of the wound at this time. Wound was apparently necrotic but not overtly infected. Surgical cultures are negative thus far and patient doing well, continues to remain off antibiotics. Wound vac is in place, patient notes pain deloris when the stump is being evaluated.   Staph capitis from surgical culture is growing. This is a coag negative staph which has low pathogenicity, often a contaminant and unlikely to have caused wound dehiscence.  if no other organisms grow, prior hospitalist states would still opt for no antibiotics at this point.   Planned for repeat washout on 5/20.  Resumed plavix and crestor. Holding SQH. .  Defer resumption of AC to surgeons.  -Paged vascular surgery, needs assessment of vascular surgery regarding indications for DVT PXP, i.e. currently SQH every 8 hours when she is discharged to TCU.  Discussion with patient it appears that PTA Xarelto was prior to amputation.     Acute renal  failure  Occurred during recent hospital course likely 2/2 rhabdomyolysis and ischemic injury. Has been dialysis depdent on Tuesday Thursday, Saturday. However, has been having increased urination and possible that she is having at least some renal recovry.  See nephrology note 5/20/2024 making urine.  Per nephrology hold dialysis 5/21/2024.  CR 5/21/2024 CR increased to 2.54, yesterday 2.22, continues to make urine.  -5/22/2024.  See nephrology note.  Continues to make urine.  CR stable 2.5 range last 3 days.  Hold HD.  Continue to monitor CR.  5/23/2024 serial increase in CR since hold of HD, currently 2.76, with HD 1.9-2.2.  Continues to make urine.  Currently HD on hold.  See Nephrology Note 5/23/2024 regarding need for definitive plan regarding dialysis prior to discharge  5/25/2024, see nephrology note.  CR continues to rise on hold of HD today 2.88, with dialysis 1.9-1.22, potassium 5.5, continues to make urine.  BMP in a.m. awaiting CR plateau.,  Nephrology continues to follow.     Anemia of chronic kidney disease  Patient has history of acute blood loss anemia, with retroperitoneal hematoma and surgeries.  Hemoglobin stable around 10 at this time.  No active bleeding at this time  -Hemoglobin stable     Hypertension  Hyperlipidemia  History of coronary artery disease  No active chest pain at this time, blood pressure slightly on the higher side.  She is on PTA amlodipine 10 mg daily, Coreg 6.25 mg twice daily and Bumex, as needed hydralazine       Possible celiac disease  History of loose stools  Patient is noncompliant with gluten-free diet.  Mild abdominal tenderness on exam.  At some point need to follow-up with the GI and counseling.     Asthma  GERD  No acute exacerbation of asthma, continue PTA Breo Ellipta   Continue PTA Pepcid 20 mg daily.     History of diabetes mellitus type 2  Last hemoglobin A1c was 5.2  Her Jardiance was discontinued.  She has had low blood sugars going down to 50s/60s range.   Placed on hypoglycemia protocol.  Currently on diet control, glucoses WNL     History of B-cell lymphoma  Followed by [place] [facility]  Continue outpatient surveillance and follow-up with oncology.     Hyponatremia  This is likely secondary to renal failure.  Nephrology to follow,  ultrafiltrate with dialysis.          Diet: Fluid restriction 1500 ML FLUID  Snacks/Supplements Adult: Margarita Garrison Standard Oral Supplement; With Meals  Combination Diet Gluten Free Diet; 2 gm K Diet    DVT Prophylaxis: Heparin SQ  Alvarez Catheter: Not present  Lines: PRESENT      CVC Double Lumen Right Internal jugular Tunneled-Site Assessment: WDL (This is a right chest port for dialysis not an IJ)      Cardiac Monitoring: None  Code Status: Full Code      Clinically Significant Risk Factors        # Hyperkalemia: Highest K = 5.6 mmol/L in last 2 days, will monitor as appropriate  # Hyponatremia: Lowest Na = 126 mmol/L in last 2 days, will monitor as appropriate      # Hypoalbuminemia: Lowest albumin = 1.8 g/dL at 5/18/2024  7:25 AM, will monitor as appropriate     # Hypertension: Noted on problem list         # Moderate Malnutrition: based on nutrition assessment      # Financial/Environmental Concerns:           Disposition Plan   Medically Ready for Discharge: 1-2 days pending stability of CR/potassium, nephrology plan established.  Needs vascular surgery input regarding AC.     Bethanie Staley MD  Hospitalist Service  [facility]  Securely message with [employer] (more info)  Text page via [employer] Paging/Directory       I spent 35 minutes total time in management of care today 5/25/2024 reviewing labs, medications, interdisciplinary notes; and completing documentation of encounter and orders with Care Management including counseling/discussion withthe Patient regarding LIMA and Coordinating Care and plan with Nursing and Specialists, Nephrology regarding  management and surveillance, as above.   ______________________________________________________________________    Interval History   No pain.  Nursing reports no wound issues, afebrile.  Continues to make urine.    Physical Exam   BP (!) 158/70 (BP Location: Left arm)   Pulse 71   Temp 99.6  F (37.6  C) (Oral)   Resp 16   Wt 63.6 kg (140 lb 3.4 oz)   LMP  (LMP Unknown)   SpO2 92%   BMI 26.51 kg/m      General/Constitutional:   NAD, alert, calm, cooperative    Chest/Respiratory: Respirations nonlabored room air  Gastrointestinal/Abdomen:  soft, nontender, no rebound, guarding or other peritoneal signs.  PureWick, fairly concentrated clear urine.  MSK.  Right AKA, stump dressed.  Wound VAC on.  Neuro.  Gross motor tested, nonfocal,  Psych oriented, affect calm      Data   ------------------------- PAST 24 HR DATA REVIEWED -----------------------------------------------    I have personally reviewed the following data over the past 24 hrs:    3.4 (L)  \   9.1 (L)   / 216     126 (L) 92 (L) 57.2 (H) /  102 (H)   5.5 (H) 22 2.92 (H) \     ALT: N/A AST: N/A AP: N/A TBILI: N/A   ALB: 2.3 (L) TOT PROTEIN: N/A LIPASE: N/A       Imaging results reviewed over the past 24 hrs:

## 2024-05-25 NOTE — PROGRESS NOTES
Date/Time: 5/25/24, 5316-4438     Trauma/Ortho/Medical (Choose one) Ortho     Diagnosis: Right AKA, I and D   POD#:5  Mental Status: A&O x 4  Activity/dangle: Assist of 2 with slider board. Pt stayed in bed during the shift.  Diet: Renal and Gluten free, 1500 ml fluid restriction  Pain: PRN Oxycodone given for pain management  Alvarez/Voiding: Pure wick  Tele/Restraints/Iso:N/A  02/LDA: On Room Air. No IV access  D/C Date: To be determined  Other Info: Right chest Dialysis port. Wound vac in place with 0ml output

## 2024-05-26 LAB
ALBUMIN SERPL BCG-MCNC: 1.9 G/DL (ref 3.5–5.2)
ANION GAP SERPL CALCULATED.3IONS-SCNC: 9 MMOL/L (ref 7–15)
BUN SERPL-MCNC: 60.8 MG/DL (ref 8–23)
CALCIUM SERPL-MCNC: 7.9 MG/DL (ref 8.8–10.2)
CHLORIDE SERPL-SCNC: 95 MMOL/L (ref 98–107)
CREAT SERPL-MCNC: 3.09 MG/DL (ref 0.51–0.95)
DEPRECATED HCO3 PLAS-SCNC: 23 MMOL/L (ref 22–29)
EGFRCR SERPLBLD CKD-EPI 2021: 16 ML/MIN/1.73M2
GLUCOSE SERPL-MCNC: 76 MG/DL (ref 70–99)
PHOSPHATE SERPL-MCNC: 6.2 MG/DL (ref 2.5–4.5)
POTASSIUM SERPL-SCNC: 5.4 MMOL/L (ref 3.4–5.3)
SODIUM SERPL-SCNC: 127 MMOL/L (ref 135–145)

## 2024-05-26 PROCEDURE — 250N000013 HC RX MED GY IP 250 OP 250 PS 637

## 2024-05-26 PROCEDURE — 250N000013 HC RX MED GY IP 250 OP 250 PS 637: Performed by: INTERNAL MEDICINE

## 2024-05-26 PROCEDURE — 120N000001 HC R&B MED SURG/OB

## 2024-05-26 PROCEDURE — 80069 RENAL FUNCTION PANEL: CPT | Performed by: INTERNAL MEDICINE

## 2024-05-26 PROCEDURE — 99232 SBSQ HOSP IP/OBS MODERATE 35: CPT | Performed by: STUDENT IN AN ORGANIZED HEALTH CARE EDUCATION/TRAINING PROGRAM

## 2024-05-26 PROCEDURE — 36415 COLL VENOUS BLD VENIPUNCTURE: CPT | Performed by: INTERNAL MEDICINE

## 2024-05-26 PROCEDURE — 99232 SBSQ HOSP IP/OBS MODERATE 35: CPT | Performed by: HOSPITALIST

## 2024-05-26 PROCEDURE — 250N000011 HC RX IP 250 OP 636

## 2024-05-26 RX ADMIN — OXYCODONE HYDROCHLORIDE 5 MG: 5 TABLET ORAL at 21:26

## 2024-05-26 RX ADMIN — BUMETANIDE 2 MG: 1 TABLET ORAL at 20:54

## 2024-05-26 RX ADMIN — MICONAZOLE NITRATE: 2 POWDER TOPICAL at 10:33

## 2024-05-26 RX ADMIN — Medication 1 CAPSULE: at 13:43

## 2024-05-26 RX ADMIN — PROCHLORPERAZINE MALEATE 5 MG: 5 TABLET ORAL at 17:57

## 2024-05-26 RX ADMIN — Medication 1 CAPSULE: at 09:31

## 2024-05-26 RX ADMIN — SEVELAMER HYDROCHLORIDE 400 MG: 400 TABLET, FILM COATED ORAL at 21:04

## 2024-05-26 RX ADMIN — FLUTICASONE FUROATE AND VILANTEROL TRIFENATATE 1 PUFF: 200; 25 POWDER RESPIRATORY (INHALATION) at 09:46

## 2024-05-26 RX ADMIN — CETIRIZINE HYDROCHLORIDE 5 MG: 5 TABLET ORAL at 09:31

## 2024-05-26 RX ADMIN — HEPARIN SODIUM 5000 UNITS: 5000 INJECTION, SOLUTION INTRAVENOUS; SUBCUTANEOUS at 06:06

## 2024-05-26 RX ADMIN — HEPARIN SODIUM 5000 UNITS: 5000 INJECTION, SOLUTION INTRAVENOUS; SUBCUTANEOUS at 13:43

## 2024-05-26 RX ADMIN — SEVELAMER HYDROCHLORIDE 400 MG: 400 TABLET, FILM COATED ORAL at 09:31

## 2024-05-26 RX ADMIN — AMLODIPINE BESYLATE 10 MG: 10 TABLET ORAL at 09:31

## 2024-05-26 RX ADMIN — NICOTINE 1 PATCH: 14 PATCH, EXTENDED RELEASE TRANSDERMAL at 09:30

## 2024-05-26 RX ADMIN — ACETAMINOPHEN 650 MG: 325 TABLET, FILM COATED ORAL at 03:50

## 2024-05-26 RX ADMIN — CLOPIDOGREL BISULFATE 75 MG: 75 TABLET ORAL at 09:31

## 2024-05-26 RX ADMIN — SEVELAMER HYDROCHLORIDE 400 MG: 400 TABLET, FILM COATED ORAL at 13:43

## 2024-05-26 RX ADMIN — HEPARIN SODIUM 5000 UNITS: 5000 INJECTION, SOLUTION INTRAVENOUS; SUBCUTANEOUS at 20:56

## 2024-05-26 RX ADMIN — Medication 60 ML: at 09:37

## 2024-05-26 RX ADMIN — FAMOTIDINE 20 MG: 20 TABLET, FILM COATED ORAL at 20:55

## 2024-05-26 RX ADMIN — CARVEDILOL 12.5 MG: 12.5 TABLET, FILM COATED ORAL at 20:54

## 2024-05-26 RX ADMIN — MICONAZOLE NITRATE: 2 POWDER TOPICAL at 20:56

## 2024-05-26 RX ADMIN — ONDANSETRON 4 MG: 4 TABLET, ORALLY DISINTEGRATING ORAL at 15:47

## 2024-05-26 RX ADMIN — CARVEDILOL 12.5 MG: 12.5 TABLET, FILM COATED ORAL at 09:31

## 2024-05-26 RX ADMIN — ROSUVASTATIN CALCIUM 10 MG: 10 TABLET, FILM COATED ORAL at 20:56

## 2024-05-26 ASSESSMENT — ACTIVITIES OF DAILY LIVING (ADL)
ADLS_ACUITY_SCORE: 51
ADLS_ACUITY_SCORE: 50
ADLS_ACUITY_SCORE: 51
ADLS_ACUITY_SCORE: 50
ADLS_ACUITY_SCORE: 51
ADLS_ACUITY_SCORE: 50
ADLS_ACUITY_SCORE: 51
ADLS_ACUITY_SCORE: 51
ADLS_ACUITY_SCORE: 50
ADLS_ACUITY_SCORE: 51
ADLS_ACUITY_SCORE: 50
ADLS_ACUITY_SCORE: 51
ADLS_ACUITY_SCORE: 51
ADLS_ACUITY_SCORE: 50
ADLS_ACUITY_SCORE: 51
ADLS_ACUITY_SCORE: 51
ADLS_ACUITY_SCORE: 50

## 2024-05-26 NOTE — PLAN OF CARE
Goal Outcome Evaluation:       [id number]      Diagnosis: I & D R AKA and placement of wound vac, POD#6  Orientation/Cognitive: A&OX4  Mobility Level: A2 slider board, T/R  Pain Management: tylenol for headache, otherwise denies  Tele/VS/O2:VSS ex. De-sat to 88% when at sleep, placed on 02@2L  Diet: Gluten free, 2g K+ diet, 1500 fluid restriction  Bowel/Bladder: incontinent/purewick in place  Skin Concerns: scattered bruises, sacrum wound/reddened- mepilex dressing changed  Drains/Devices: CVC double lumen, Right IJ  Discharge date/time: TCU pending

## 2024-05-26 NOTE — PROGRESS NOTES
Vascular Surgery Progress Note  05/26/2024       Subjective:  Resting comfortably in bed no new issues.      Objective:  Temp:  [98.1  F (36.7  C)-99.2  F (37.3  C)] 98.1  F (36.7  C)  Pulse:  [57-65] 57  Resp:  [16-18] 16  BP: (136-154)/(58-71) 140/58  SpO2:  [88 %-95 %] 92 %    I/O last 3 completed shifts:  In: 950 [P.O.:950]  Out: 1300 [Urine:1300]      Gen: Awake, alert, NAD  Resp: NLB on RA  Incision: Wound VAC holding seal on RLE    Labs:  Recent Labs   Lab 05/24/24  1648 05/21/24  0725 05/20/24  0820   WBC 3.4* 3.9* 4.2   HGB 9.1* 9.3* 10.3*    181 194       Recent Labs   Lab 05/26/24  0737 05/25/24  0900 05/24/24  1816 05/23/24  0813   * 126* 128* 127*   POTASSIUM 5.4* 5.5* 5.6* 5.3   CHLORIDE 95* 92* 94* 95*   CO2 23 22 26 21*   BUN 60.8* 57.2* 54.2* 43.1*   CR 3.09* 2.92* 2.88* 2.76*   GLC 76 102* 88 105*   SONIA 7.9* 8.1* 7.9* 7.6*   PHOS 6.2* 5.9*  --  5.5*     Labs not back yet today, CR pending.     Imaging:  No new imaging reviewed     Assessment/Plan:   63 year old female with extensive past vascular surgical history who is now s/p RLE AKA after acute thrombosis of CFA-AT bypass with ALI in early April of this year. Unfortunately AKA had areas of necrosis on anterior aspect, now s/p I&D, wound vac placement on 5/16.  VAC change in OR on 5/20.      -able to discharge to TCU vs ARU from Vascular Surgery standpoint  -If patient is able to definitively not require dialysis in the future strongly recommend removal of tunneled catheter prior to discharge from hospital to decrease infection risk  -follow-up scheduled  -WOC to change wound vac dressing.      Discussed with vascular surgery staff, who is in agreement with the above.  - - - - - - - - - - - - - - - - - -  Bj Lo, PGY-3  Vascular Surgery Resident

## 2024-05-26 NOTE — PROGRESS NOTES
[facility]    Medicine Progress Note - Hospitalist Service    Date of Admission:  5/15/2024    Assessment & Plan   Shirley Hendricks is a 63 year old female with complex medical history which includes anxiety, Charcot- Breonna Tooth disease, GERD, hypertension, hyperlipidemia, lupus, severe peripheral artery disease, GERD, supraventricular tachycardia, asthma presents with wound dehiscence AKA and necrosis that needs debridement.    Recently admitted in the hospital from 3/29/2024 to 4/22/2024 with right ischemic limb, severe peripheral artery disease, ultimately underwent above-knee amputation on the right side, course complicated by acute renal failure, rhabdomyolysis, sepsis, acute blood loss anemia, delirium, diarrhea and retroperitoneal hematoma. She was eventually discharged to acute rehab, now presenting because of  Underwent debridement of the stump on 5/16, cultures thus far negative     R AKA c/b wound dehiscence  S/p wound vac placement  S/p washout 5/20/24  No evidence of a bacterial SSTI of the wound at this time. Wound was apparently necrotic but not overtly infected. Surgical cultures are negative thus far and patient doing well, continues to remain off antibiotics. Wound vac is in place,   Staph capitis from surgical culture is growing. This is a coag negative staph which has low pathogenicity, often a contaminant and unlikely to have caused wound dehiscence.  if no other organisms grow, prior hospitalist states would still opt for no antibiotics at this point.   Repeat washout on 5/20.  Resumed plavix and crestor. Holding SQH. .  Defer resumption of AC to surgeons.  -Needs assessment of Vascular Surgery regarding AC/PXP, i.e. currently SQH every 8 hours , discussion with patient it appears that PTA Xarelto was prior to amputation.     Acute renal failure  Occurred during recent hospital course likely 2/2 rhabdomyolysis and ischemic injury. Has been on HD Tuesday Thursday,  Saturday. However, has been having increased urination .  See nephrology note 5/20/2024 making urine.  Per nephrology hold dialysis 5/21/2024.  -Since hold of dialysis, last 5/21/2024 serial increase in CR [2.22 to today 3.09; and potassium today 5.4.]  Continues to make urine.  On low potassium diet.  See nephrology note 5/26/2024 continue to monitor renal function to determine plateau or need to restart HD, definitive plan pending CR/potassium, keep catheter in .  -Renal panel in a.m.     Anemia of chronic kidney disease  Patient has history of acute blood loss anemia, with retroperitoneal hematoma and surgeries.  Hemoglobin stable around 10 at this time.  No active bleeding at this time  -Update CBC in a.m.     Hypertension  Hyperlipidemia  History of coronary artery disease  No active chest pain at this time, blood pressure slightly on the higher side.  She is on PTA amlodipine 10 mg daily, Coreg 12.5 mg twice daily and Bumex, as needed hydralazine    Possible celiac disease  History of loose stools  Patient is noncompliant with gluten-free diet.  Eats, denies pain.  At some point need to follow-up with the GI and counseling.     Asthma  GERD  No acute exacerbation of asthma, continue PTA Breo Ellipta   Continue PTA Pepcid 20 mg daily.     History of diabetes mellitus type 2  Last hemoglobin A1c was 5.2  Her Jardiance was discontinued.   Placed on hypoglycemia protocol.  Currently on diet control,      History of B-cell lymphoma  Followed by [place] [facility]  Continue outpatient surveillance and follow-up with oncology.     Hyponatremia  This is likely secondary to renal failure.  Nephrology to follow,          Diet: Fluid restriction 1500 ML FLUID  Snacks/Supplements Adult: Margarita Garrison Standard Oral Supplement; With Meals  Combination Diet Gluten Free Diet; 2 gm K Diet    DVT Prophylaxis: Heparin SQ  Alvarez Catheter: Not present  Lines: PRESENT      CVC Double Lumen Right Internal jugular Tunneled-Site  Assessment: WDL      Cardiac Monitoring: None  Code Status: Full Code      Clinically Significant Risk Factors        # Hyperkalemia: Highest K = 5.6 mmol/L in last 2 days, will monitor as appropriate  # Hyponatremia: Lowest Na = 126 mmol/L in last 2 days, will monitor as appropriate      # Hypoalbuminemia: Lowest albumin = 1.8 g/dL at 5/18/2024  7:25 AM, will monitor as appropriate     # Hypertension: Noted on problem list         # Moderate Malnutrition: based on nutrition assessment      # Financial/Environmental Concerns:           Disposition Plan   Medically Ready for Discharge: +2days pending stability of CR/potassium, nephrology plan established.  Needs vascular surgery input regarding AC, see above. .     Bethanie Staley MD  Hospitalist Service  [facility]  Securely message with [employer] (more info)  Text page via [employer]     I spent 35 minutes total time in management of care today 5/26/2024 reviewing labs, medications, interdisciplinary notes; and completing documentation of encounter and orders with Care Management including counseling/discussion withthe Patient regarding renal function and wounds and Coordinating Care and plan with Nursing and Specialists, Nephrology  regarding LIMA management and surveillance ______________________________________________________________________    Interval History   Denies pain.  Nursing reports no wound issues, afebrile.  Continues to make urine.     Physical Exam   BP (!) 140/58   Pulse 57   Temp 98.1  F (36.7  C) (Oral)   Resp 16   Wt 57.9 kg (127 lb 10.3 oz)   LMP  (LMP Unknown)   SpO2 92%   BMI 24.13 kg/m      General/Constitutional: NAD, alert, calm, cooperative  Chest/Respiratory: Respirations nonlabored room air  Gastrointestinal/Abdomen:  soft, nontender,  PureWick, fairly concentrated yet clear urine.  MSK.  Right AKA, stump dressed.  Wound VAC on.  Neuro.  Gross motor tested, nonfocal,  Psych  affect calm       Data   ------------------------- PAST 24 HR DATA REVIEWED -----------------------------------------------    I have personally reviewed the following data over the past 24 hrs:    N/A  \   N/A   / N/A     127 (L) 95 (L) 60.8 (H) /  76   5.4 (H) 23 3.09 (H) \     ALT: N/A AST: N/A AP: N/A TBILI: N/A   ALB: 1.9 (L) TOT PROTEIN: N/A LIPASE: N/A       Imaging results reviewed over the past 24 hrs:

## 2024-05-26 NOTE — PROGRESS NOTES
.Date/Time 5/26/24 [id number]  Diagnosis:Right AKA I&D with wound vac  POD#:6  Mental Status:A&OX4  Activity/dangle: Up with A2  Diet:2gm K+, gluten free, 1500cc  Pain:Denies   Alvarez/Voiding:Purewick  Tele/Restraints/Iso:NA  02/LDA:RA/ SL  D/C Date: TBD  Other Info:VSS on RA. Dressing CDI. CVC dressing intact. Mepilex coccyx for prevention.

## 2024-05-26 NOTE — PROGRESS NOTES
Renal Medicine Progress Note            Assessment/Plan:     Assessment:      LIMA  Has been HD dependent, but now with some renal recovery. Last HD 5/18. UOP improving. No uremic symptoms. Cr continues to increase daily, has not reached a plateau so unclear what her residual renal function is. Would like to see plateau in renal function prior to removing catheter, not completely out of the woods of needing further dialysis. No acute indications for dialysis right now.   - daily renal panel   - I/os   - monitor symptoms   - do not yet remove catheter     Hyperkalemia, stable   K 5.5. rising since stopping dialysis. On renal diet, but reports eating fries every morning. Discussed low K diet, changed diet order. Can hold off on lokelma for now.    R AKA wound dehiscence   S/p washout and wound vac   Vascular surgery following. Seems to be healing well.     Plan/Recs:  1) no acute indication for HD   2) Cr continues to worsen daily, awaiting plateau   3) please keep catheter in place until clear that she will not need dialysis   4) low K diet   5) will assess dialysis needs daily, plan to have catheter flushed and cleaned tomorrow by dialysis RN     Reviewed notes from Dr. Lo (vascular), Dr. Staley (hospitalist). Discussed plan with Dr. Staley.         Kiana Joseph MD   [facility]  Office: [phone]          Interval History:       No acute events overnight   UOP significantly improved with 1.2L yesterday   Denies SOB   Denies n/v   Denies dysgeusia   Feels fatigued today, didn't sleep well overnight, otherwise no other complaints          Medications and Allergies:     Current Facility-Administered Medications   Medication Dose Route Frequency Provider Last Rate Last Admin    - MEDICATION INSTRUCTIONS for Dialysis Patients -   Does not apply See Admin Instructions Lvey Byers MD        amLODIPine (NORVASC) tablet 10 mg  10 mg Oral Daily Manuel Robledo MD   10 mg at 05/26/24  0931    bumetanide (BUMEX) tablet 2 mg  2 mg Oral Daily Nereida Vaz MD   2 mg at 05/25/24 1828    carvedilol (COREG) tablet 12.5 mg  12.5 mg Oral BID w/meals Nereida Vaz MD   12.5 mg at 05/26/24 0931    cetirizine (zyrTEC) tablet 5 mg  5 mg Oral Daily Manuel Robledo MD   5 mg at 05/26/24 0931    clopidogrel (PLAVIX) tablet 75 mg  75 mg Oral Daily Gala Lo MD   75 mg at 05/26/24 0931    famotidine (PEPCID) tablet 20 mg  20 mg Oral Q48H Gala Lo MD   20 mg at 05/24/24 1742    Or    famotidine (PEPCID) injection 20 mg  20 mg Intravenous Q48H Gala Lo MD        fluticasone-vilanterol (BREO ELLIPTA) 200-25 MCG/ACT inhaler 1 puff  1 puff Inhalation Daily Manuel Robledo MD   1 puff at 05/26/24 0946    gabapentin (NEURONTIN) capsule 200 mg  200 mg Oral Once per day on Tuesday Thursday Saturday Manuel Robledo MD   200 mg at 05/25/24 2127    heparin ANTICOAGULANT injection 5,000 Units  5,000 Units Subcutaneous Q8H Gala Lo MD   5,000 Units at 05/26/24 1343    lactobacillus rhamnosus (GG) (CULTURELL) capsule 1 capsule  1 capsule Oral BID Manuel Robledo MD   1 capsule at 05/26/24 0931    miconazole (MICATIN) 2 % powder   Topical BID Manuel Robledo MD   Given at 05/26/24 1033    multivitamin RENAL (TRIPHROCAPS) capsule 1 capsule  1 capsule Oral Daily Manuel Robledo MD   1 capsule at 05/26/24 1343    nicotine (NICODERM CQ) 14 MG/24HR 24 hr patch 1 patch  1 patch Transdermal Daily Manuel Robledo MD   1 patch at 05/26/24 0930    polyethylene glycol (MIRALAX) Packet 17 g  17 g Oral Daily Gala Lo MD   17 g at 05/24/24 1021    rosuvastatin (CRESTOR) tablet 10 mg  10 mg Oral At Bedtime Manuel Robledo MD   10 mg at 05/25/24 2127    [Held by provider] senna-docusate (SENOKOT-S/PERICOLACE) 8.6-50 MG per tablet 1-2 tablet  1-2 tablet Oral BID Manuel Robledo MD        sevelamer HCl (RENAGEL) tablet 400 mg  400 mg Oral TID w/meals Татьяна,  MD Nereida   400 mg at 05/26/24 1343    silver sulfADIAZINE (SILVADENE) 1 % cream   Topical Daily Manuel Robledo MD        wound support modular (EXPEDITE) bottle 60 mL  60 mL Oral Daily Levy Byers MD   60 mL at 05/26/24 0937        Allergies   Allergen Reactions    Pantoprazole      Protonix caused diffuse edema    Chantix [Varenicline]      Terrible dreams    Contrast Dye Swelling     Patient reports facial and throat swelling with prior CT contrast.    Penicillins Itching and Rash            Physical Exam:   Vitals were reviewed  BP (!) 140/58   Pulse 57   Temp 98.1  F (36.7  C) (Oral)   Resp 16   Wt 57.9 kg (127 lb 10.3 oz)   LMP  (LMP Unknown)   SpO2 92%   BMI 24.13 kg/m      Wt Readings from Last 3 Encounters:   05/26/24 57.9 kg (127 lb 10.3 oz)   05/14/24 57 kg (125 lb 10.6 oz)   04/22/24 50.5 kg (111 lb 5.3 oz)       Intake/Output Summary (Last 24 hours) at 5/25/2024 1424  Last data filed at 5/25/2024 0930  Gross per 24 hour   Intake 940 ml   Output 500 ml   Net 440 ml       GENERAL APPEARANCE: NAD, frail appearing  HEENT: normocephalic, MMM  RESP: clear anteriorly  CV: RRR  EXTREMITIES/SKIN: L foot edema, R BKA  NEURO:  alert, oriented, normal speech            Data:     BMP  Recent Labs   Lab 05/26/24  0737 05/25/24  0900 05/24/24  1816 05/23/24  0813   * 126* 128* 127*   POTASSIUM 5.4* 5.5* 5.6* 5.3   CHLORIDE 95* 92* 94* 95*   SONIA 7.9* 8.1* 7.9* 7.6*   CO2 23 22 26 21*   BUN 60.8* 57.2* 54.2* 43.1*   CR 3.09* 2.92* 2.88* 2.76*   GLC 76 102* 88 105*     CBC  Recent Labs   Lab 05/24/24  1648 05/21/24  0725 05/20/24  0820   WBC 3.4* 3.9* 4.2   HGB 9.1* 9.3* 10.3*   HCT 28.8* 29.3* 33.7*   MCV 96 95 99    181 194     Lab Results   Component Value Date    AST 13 05/16/2024    ALT 9 05/16/2024    ALKPHOS 74 05/16/2024    BILITOTAL 0.2 05/16/2024    BILICONJ 0.0 01/23/2004     Lab Results   Component Value Date    INR 1.31 (H) 04/11/2024     Color Urine (no units)   Date Value    05/03/2024 Orange (A)   02/04/2019 Yellow     Appearance Urine (no units)   Date Value   05/03/2024 Cloudy (A)   02/04/2019 Clear     Glucose Urine (mg/dL)   Date Value   05/03/2024 Negative   02/04/2019 Negative     Bilirubin Urine (no units)   Date Value   05/03/2024 Negative   02/04/2019 Negative     Ketones Urine (mg/dL)   Date Value   05/03/2024 Negative   02/04/2019 Negative     Specific Gravity Urine (no units)   Date Value   05/03/2024 1.018   02/04/2019 1.010     pH Urine   Date Value   05/03/2024 6.0   02/04/2019 6.0 pH     Protein Albumin Urine (mg/dL)   Date Value   05/03/2024 200 (A)   02/04/2019 Negative     Urobilinogen Urine (EU/dL)   Date Value   02/04/2019 0.2     Nitrite Urine (no units)   Date Value   05/03/2024 Negative   02/04/2019 Negative     Leukocyte Esterase Urine (no units)   Date Value   05/03/2024 Large (A)   02/04/2019 Negative         Attestation:  I have reviewed today's vital signs, notes, medications, labs and imaging.    Kiana Joseph MD  [facility] - Nephrology  Office: [phone]

## 2024-05-26 NOTE — PLAN OF CARE
Date/Time 5/25 devendra    Trauma/Ortho/Medical (Choose one) ortho    Diagnosis:I&d right AKA  POD#:5  Mental Status:a&o  Activity/dangle up with wheelchair slider board and 2 assist.   Diet:gluten free/low potassium  Pain:oxycodone  Alvarez/Voiding:purewick  Tele/Restraints/Iso:no  02/LDA:no saline lock  D/C Date:soon?   Other Info:hols any stool softners-loose stools

## 2024-05-27 LAB
ALBUMIN SERPL BCG-MCNC: 1.9 G/DL (ref 3.5–5.2)
ANION GAP SERPL CALCULATED.3IONS-SCNC: 10 MMOL/L (ref 7–15)
BUN SERPL-MCNC: 61.9 MG/DL (ref 8–23)
CALCIUM SERPL-MCNC: 8 MG/DL (ref 8.8–10.2)
CHLORIDE SERPL-SCNC: 95 MMOL/L (ref 98–107)
CREAT SERPL-MCNC: 3.01 MG/DL (ref 0.51–0.95)
DEPRECATED HCO3 PLAS-SCNC: 22 MMOL/L (ref 22–29)
EGFRCR SERPLBLD CKD-EPI 2021: 17 ML/MIN/1.73M2
ERYTHROCYTE [DISTWIDTH] IN BLOOD BY AUTOMATED COUNT: 15.5 % (ref 10–15)
GLUCOSE SERPL-MCNC: 76 MG/DL (ref 70–99)
HCT VFR BLD AUTO: 26.4 % (ref 35–47)
HGB BLD-MCNC: 8.3 G/DL (ref 11.7–15.7)
MCH RBC QN AUTO: 30.2 PG (ref 26.5–33)
MCHC RBC AUTO-ENTMCNC: 31.4 G/DL (ref 31.5–36.5)
MCV RBC AUTO: 96 FL (ref 78–100)
PHOSPHATE SERPL-MCNC: 6 MG/DL (ref 2.5–4.5)
PLATELET # BLD AUTO: 267 10E3/UL (ref 150–450)
POTASSIUM SERPL-SCNC: 5.2 MMOL/L (ref 3.4–5.3)
RBC # BLD AUTO: 2.75 10E6/UL (ref 3.8–5.2)
SODIUM SERPL-SCNC: 127 MMOL/L (ref 135–145)
WBC # BLD AUTO: 3.8 10E3/UL (ref 4–11)

## 2024-05-27 PROCEDURE — 80069 RENAL FUNCTION PANEL: CPT | Performed by: INTERNAL MEDICINE

## 2024-05-27 PROCEDURE — 250N000013 HC RX MED GY IP 250 OP 250 PS 637

## 2024-05-27 PROCEDURE — 36415 COLL VENOUS BLD VENIPUNCTURE: CPT | Performed by: INTERNAL MEDICINE

## 2024-05-27 PROCEDURE — 250N000013 HC RX MED GY IP 250 OP 250 PS 637: Performed by: INTERNAL MEDICINE

## 2024-05-27 PROCEDURE — 120N000001 HC R&B MED SURG/OB

## 2024-05-27 PROCEDURE — 99232 SBSQ HOSP IP/OBS MODERATE 35: CPT | Performed by: INTERNAL MEDICINE

## 2024-05-27 PROCEDURE — 85027 COMPLETE CBC AUTOMATED: CPT

## 2024-05-27 PROCEDURE — 250N000011 HC RX IP 250 OP 636

## 2024-05-27 RX ADMIN — SEVELAMER HYDROCHLORIDE 400 MG: 400 TABLET, FILM COATED ORAL at 09:24

## 2024-05-27 RX ADMIN — CLOPIDOGREL BISULFATE 75 MG: 75 TABLET ORAL at 09:24

## 2024-05-27 RX ADMIN — ROSUVASTATIN CALCIUM 10 MG: 10 TABLET, FILM COATED ORAL at 21:38

## 2024-05-27 RX ADMIN — CETIRIZINE HYDROCHLORIDE 5 MG: 5 TABLET ORAL at 09:24

## 2024-05-27 RX ADMIN — NICOTINE 1 PATCH: 14 PATCH, EXTENDED RELEASE TRANSDERMAL at 09:23

## 2024-05-27 RX ADMIN — Medication 1 CAPSULE: at 14:39

## 2024-05-27 RX ADMIN — FLUTICASONE FUROATE AND VILANTEROL TRIFENATATE 1 PUFF: 200; 25 POWDER RESPIRATORY (INHALATION) at 09:34

## 2024-05-27 RX ADMIN — BUMETANIDE 2 MG: 1 TABLET ORAL at 17:59

## 2024-05-27 RX ADMIN — CARVEDILOL 12.5 MG: 12.5 TABLET, FILM COATED ORAL at 09:24

## 2024-05-27 RX ADMIN — SEVELAMER HYDROCHLORIDE 400 MG: 400 TABLET, FILM COATED ORAL at 14:39

## 2024-05-27 RX ADMIN — MICONAZOLE NITRATE: 2 POWDER TOPICAL at 09:29

## 2024-05-27 RX ADMIN — AMLODIPINE BESYLATE 10 MG: 10 TABLET ORAL at 09:24

## 2024-05-27 RX ADMIN — HEPARIN SODIUM 5000 UNITS: 5000 INJECTION, SOLUTION INTRAVENOUS; SUBCUTANEOUS at 06:05

## 2024-05-27 RX ADMIN — HEPARIN SODIUM 5000 UNITS: 5000 INJECTION, SOLUTION INTRAVENOUS; SUBCUTANEOUS at 21:38

## 2024-05-27 RX ADMIN — SEVELAMER HYDROCHLORIDE 400 MG: 400 TABLET, FILM COATED ORAL at 17:59

## 2024-05-27 RX ADMIN — Medication 1 CAPSULE: at 21:38

## 2024-05-27 RX ADMIN — MICONAZOLE NITRATE: 2 POWDER TOPICAL at 21:38

## 2024-05-27 RX ADMIN — ACETAMINOPHEN 650 MG: 325 TABLET, FILM COATED ORAL at 02:38

## 2024-05-27 RX ADMIN — Medication 1 CAPSULE: at 09:24

## 2024-05-27 RX ADMIN — OXYCODONE HYDROCHLORIDE 5 MG: 5 TABLET ORAL at 21:38

## 2024-05-27 RX ADMIN — HEPARIN SODIUM 5000 UNITS: 5000 INJECTION, SOLUTION INTRAVENOUS; SUBCUTANEOUS at 14:40

## 2024-05-27 RX ADMIN — CARVEDILOL 12.5 MG: 12.5 TABLET, FILM COATED ORAL at 17:59

## 2024-05-27 RX ADMIN — Medication 60 ML: at 09:27

## 2024-05-27 ASSESSMENT — ACTIVITIES OF DAILY LIVING (ADL)
ADLS_ACUITY_SCORE: 51
ADLS_ACUITY_SCORE: 51
ADLS_ACUITY_SCORE: 49
ADLS_ACUITY_SCORE: 51
ADLS_ACUITY_SCORE: 49
ADLS_ACUITY_SCORE: 51
ADLS_ACUITY_SCORE: 55
ADLS_ACUITY_SCORE: 51
ADLS_ACUITY_SCORE: 49
ADLS_ACUITY_SCORE: 55
ADLS_ACUITY_SCORE: 51
ADLS_ACUITY_SCORE: 50
ADLS_ACUITY_SCORE: 51
ADLS_ACUITY_SCORE: 50
ADLS_ACUITY_SCORE: 51
ADLS_ACUITY_SCORE: 49
ADLS_ACUITY_SCORE: 51
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
## 2024-05-27 NOTE — PROGRESS NOTES
Vascular Surgery Progress Note  05/27/2024       Subjective:  Resting comfortably in bed. Reports minimal appetite yesterday, otherwise doing well. Did eat grapes and yogurt this am.      Objective:  Temp:  [97.8  F (36.6  C)-99.5  F (37.5  C)] 97.8  F (36.6  C)  Pulse:  [57-65] 62  Resp:  [16] 16  BP: (140-162)/(58-65) 146/60  SpO2:  [92 %-95 %] 92 %    I/O last 3 completed shifts:  In: 780 [P.O.:780]  Out: 1300 [Urine:1300]      Gen: Awake, alert, NAD  Resp: NLB on RA  Incision: Wound VAC holding seal on RLE    Labs:  Recent Labs   Lab 05/27/24  0713 05/24/24  1648 05/21/24  0725   WBC 3.8* 3.4* 3.9*   HGB 8.3* 9.1* 9.3*    216 181       Recent Labs   Lab 05/27/24  0713 05/26/24  0737 05/25/24  0900   * 127* 126*   POTASSIUM 5.2 5.4* 5.5*   CHLORIDE 95* 95* 92*   CO2 22 23 22   BUN 61.9* 60.8* 57.2*   CR 3.01* 3.09* 2.92*   GLC 76 76 102*   SONIA 8.0* 7.9* 8.1*   PHOS 6.0* 6.2* 5.9*     Cr today 3.01 from 3.09.     Imaging:  No new imaging reviewed     Assessment/Plan:   63 year old female with extensive past vascular surgical history who is now s/p RLE AKA after acute thrombosis of CFA-AT bypass with ALI in early April of this year. Unfortunately AKA had areas of necrosis on anterior aspect, now s/p I&D, wound vac placement on 5/16.  VAC change in OR on 5/20.      -able to discharge to TCU vs ARU from Vascular Surgery standpoint  -If patient is able to definitively not require dialysis in the future strongly recommend removal of tunneled catheter prior to discharge from hospital to decrease infection risk  - Pt Cr has now reached plateau, defer to nephrology on recs for dialysis, however as above would like catheter removed if able.  -follow-up scheduled  -WOC to change wound vac dressing - changing tues/fri this week      Discussed with vascular surgery staff, who is in agreement with the above.  - - - - - - - - - - - - - - - - - -  Bj Lo, PGY-3  Vascular Surgery Resident

## 2024-05-27 NOTE — PLAN OF CARE
.Date/Time 5/27/24 1250-8550  Diagnosis: Right AKA I&D with wound vac  POD#:7  Mental Status:A&OX4  Activity/dangle: A2  Diet:2gm K+, Gluten free, 1500ml fluid restriction   Pain:Denies  Alvarez/Voiding:Purewick   Tele/Restraints/Iso:NA  02/LDA:RA/ AMPARO  D/C Date:TBD  Other Info:VSS on RA. Dressing CDI. Wound vac intact. Turn & repo. CVC on Right chest.scattered bruises.

## 2024-05-27 NOTE — PROGRESS NOTES
[facility]    Nephrology Progress Note     Assessment & Plan     LIMA  Has been HD dependent, but now with some renal recovery. Last HD 5/18. UOP improving. Some nausea.  Uremia ? Cr has reached a plateau.  No acute indications for dialysis right now.   - daily renal panel   - I/os   - monitor symptoms   - if cr same or better tomorrow then will have CVC removed.       Hyperkalemia, stable   K better.       R AKA wound dehiscence   S/p washout and wound vac   Vascular surgery following. Seems to be healing well.          Jacobo Torre MD  [facility] - Nephrology  [phone]    Interval History     Poor appetite yesterday.  Nausea but no vomiting.  She had fruit and yogurt for breakfast.  Some SOB with exertion.       Physical Exam   Temp: 97.8  F (36.6  C) Temp src: Oral BP: (!) 146/60 Pulse: 62   Resp: 16 SpO2: 92 % O2 Device: None (Room air) Oxygen Delivery: 1 LPM  Vitals:    05/25/24 0617 05/26/24 0618 05/27/24 0639   Weight: 63.6 kg (140 lb 3.4 oz) 57.9 kg (127 lb 10.3 oz) 56.7 kg (125 lb)     Vital Signs with Ranges  Temp:  [97.8  F (36.6  C)-99.5  F (37.5  C)] 97.8  F (36.6  C)  Pulse:  [62-65] 62  Resp:  [16] 16  BP: (146-162)/(60-65) 146/60  SpO2:  [92 %-95 %] 92 %  I/O last 3 completed shifts:  In: 660 [P.O.:660]  Out: 1300 [Urine:1300]    GENERAL APPEARANCE:  NAD, a & o  HEENT:  Eyes/ears/nose/neck grossly normal  RESP: lungs cta b c good efforts, no crackles, rhonchi or wheezes  CV: RRR, nl S1/S2, no m/r/g   ABDOMEN: o/s/nt/nd, bs present  EXTREMITIES/SKIN: no rashes/lesions; 2+ LLE edema    Medications   Current Facility-Administered Medications   Medication Dose Route Frequency Provider Last Rate Last Admin     Current Facility-Administered Medications   Medication Dose Route Frequency Provider Last Rate Last Admin    - MEDICATION INSTRUCTIONS for Dialysis Patients -   Does not apply See Admin Instructions Modesta, Levy Jessie, MD        amLODIPine (NORVASC) tablet  10 mg  10 mg Oral Daily Manuel Robledo MD   10 mg at 05/27/24 0924    bumetanide (BUMEX) tablet 2 mg  2 mg Oral Daily Nereida Vaz MD   2 mg at 05/26/24 2054    carvedilol (COREG) tablet 12.5 mg  12.5 mg Oral BID w/meals Nereida Vaz MD   12.5 mg at 05/27/24 0924    cetirizine (zyrTEC) tablet 5 mg  5 mg Oral Daily Manuel Rboledo MD   5 mg at 05/27/24 0924    clopidogrel (PLAVIX) tablet 75 mg  75 mg Oral Daily Gala Lo MD   75 mg at 05/27/24 0924    famotidine (PEPCID) tablet 20 mg  20 mg Oral Q48H Gala Lo MD   20 mg at 05/26/24 2055    Or    famotidine (PEPCID) injection 20 mg  20 mg Intravenous Q48H Gala Lo MD        fluticasone-vilanterol (BREO ELLIPTA) 200-25 MCG/ACT inhaler 1 puff  1 puff Inhalation Daily Manuel Robledo MD   1 puff at 05/27/24 0934    gabapentin (NEURONTIN) capsule 200 mg  200 mg Oral Once per day on Tuesday Thursday Saturday Manuel Robledo MD   200 mg at 05/25/24 2127    heparin ANTICOAGULANT injection 5,000 Units  5,000 Units Subcutaneous Q8H Gala Lo MD   5,000 Units at 05/27/24 1440    lactobacillus rhamnosus (GG) (CULTURELL) capsule 1 capsule  1 capsule Oral BID Manuel Robledo MD   1 capsule at 05/27/24 0924    miconazole (MICATIN) 2 % powder   Topical BID Manuel Robledo MD   Given at 05/27/24 0929    multivitamin RENAL (TRIPHROCAPS) capsule 1 capsule  1 capsule Oral Daily Manuel Robledo MD   1 capsule at 05/27/24 1439    nicotine (NICODERM CQ) 14 MG/24HR 24 hr patch 1 patch  1 patch Transdermal Daily Manuel Robledo MD   1 patch at 05/27/24 0923    polyethylene glycol (MIRALAX) Packet 17 g  17 g Oral Daily Gala Lo MD   17 g at 05/24/24 1021    rosuvastatin (CRESTOR) tablet 10 mg  10 mg Oral At Bedtime Manuel Robledo MD   10 mg at 05/26/24 2056    [Held by provider] senna-docusate (SENOKOT-S/PERICOLACE) 8.6-50 MG per tablet 1-2 tablet  1-2 tablet Oral BID Manuel Robledo MD         sevelamer HCl (RENAGEL) tablet 400 mg  400 mg Oral TID w/meals Nereida Vaz MD   400 mg at 05/27/24 1439    silver sulfADIAZINE (SILVADENE) 1 % cream   Topical Daily Manuel Robledo MD        wound support modular (EXPEDITE) bottle 60 mL  60 mL Oral Daily Levy Byers MD   60 mL at 05/27/24 0927       Data   BMP  Recent Labs   Lab 05/27/24  0713 05/26/24  0737 05/25/24  0900 05/24/24  1816   * 127* 126* 128*   POTASSIUM 5.2 5.4* 5.5* 5.6*   CHLORIDE 95* 95* 92* 94*   SONIA 8.0* 7.9* 8.1* 7.9*   CO2 22 23 22 26   BUN 61.9* 60.8* 57.2* 54.2*   CR 3.01* 3.09* 2.92* 2.88*   GLC 76 76 102* 88     Phos@[email or web address](phos:4)  CBC)  Recent Labs   Lab 05/27/24  0713 05/24/24  1648 05/21/24  0725   WBC 3.8* 3.4* 3.9*   HGB 8.3* 9.1* 9.3*   HCT 26.4* 28.8* 29.3*   MCV 96 96 95    216 181         Attestation:   I have reviewed today's relevant vital signs, notes, medications, labs and imaging.

## 2024-05-27 NOTE — PROGRESS NOTES
[facility]    Medicine Progress Note - Hospitalist Service    Date of Admission:  5/15/2024    Assessment & Plan   Shirley Hendricks is a 63 year old female with complex medical history which includes anxiety, Charcot- Breonna Tooth disease, GERD, hypertension, hyperlipidemia, lupus, severe peripheral artery disease, GERD, supraventricular tachycardia, asthma presents with wound dehiscence AKA and necrosis that needs debridement.    Recently admitted in the hospital from 3/29/2024 to 4/22/2024 with right ischemic limb, severe peripheral artery disease, ultimately underwent above-knee amputation on the right side, course complicated by acute renal failure, rhabdomyolysis, sepsis, acute blood loss anemia, delirium, diarrhea and retroperitoneal hematoma. She was eventually discharged to acute rehab, now presenting because of  Underwent debridement of the stump on 5/16, cultures thus far negative     R AKA c/b wound dehiscence  S/p wound vac placement  S/p washout 5/20/24  No evidence of a bacterial SSTI of the wound at this time. Wound was apparently necrotic but not overtly infected. Surgical cultures are negative thus far and patient doing well, continues to remain off antibiotics. Wound vac is in place,   Staph capitis from surgical culture is growing. This is a coag negative staph which has low pathogenicity, often a contaminant and unlikely to have caused wound dehiscence.  if no other organisms grow, prior hospitalist states would still opt for no antibiotics at this point.   Repeat washout on 5/20.  Resumed plavix and crestor.  Continue heparin subcu for DVT prophylaxis.  Further management as per the vascular surgery team.    Acute renal failure  Occurred during recent hospital course likely 2/2 rhabdomyolysis and ischemic injury. Has been on HD Tuesday Thursday, Saturday. However, has been having increased urination .  See nephrology note 5/20/2024 making urine.  Per nephrology  holding dialysis since 5/21/2024.  -Creatinine is now stable and improving, start to improve from yesterday to 3.01.  Continues to make urine.  On low potassium diet.    Nephrology is following closely, further recommendation regarding dialysis hemodialysis per nephrology.  She is on Bumex 2 mg daily we will continue with that.     Anemia of chronic kidney disease  Patient has history of acute blood loss anemia, with retroperitoneal hematoma and surgeries.    No active bleeding at this time  -Update CBC in a.m.     Hypertension  Hyperlipidemia  History of coronary artery disease  No active chest pain at this time, blood pressure slightly on the higher side.  She is on PTA amlodipine 10 mg daily, Coreg 12.5 mg twice daily and Bumex, as needed hydralazine    Possible celiac disease  History of loose stools  Patient is noncompliant with gluten-free diet.  Eats, denies pain.  At some point need to follow-up with the GI and counseling.     Asthma  GERD  No acute exacerbation of asthma, continue PTA Breo Ellipta   Continue PTA Pepcid 20 mg daily.     History of diabetes mellitus type 2  Last hemoglobin A1c was 5.2  Her Jardiance was discontinued.   Placed on hypoglycemia protocol.  Currently on diet control,      History of B-cell lymphoma  Followed by [place] [facility]  Continue outpatient surveillance and follow-up with oncology.     Hyponatremia  This is likely secondary to renal failure.  Nephrology to follow,     On Bumex 2 mg daily we will continue with that     Diet: Fluid restriction 1500 ML FLUID  Snacks/Supplements Adult: Margarita Garrison Standard Oral Supplement; With Meals  Combination Diet Gluten Free Diet; 2 gm K Diet    DVT Prophylaxis: Heparin SQ  Alvarez Catheter: Not present  Lines: PRESENT      CVC Double Lumen Right Internal jugular Tunneled-Site Assessment: WDL      Cardiac Monitoring: None  Code Status: Full Code      Clinically Significant Risk Factors        # Hyperkalemia: Highest K = 5.4 mmol/L in last  2 days, will monitor as appropriate  # Hyponatremia: Lowest Na = 127 mmol/L in last 2 days, will monitor as appropriate      # Hypoalbuminemia: Lowest albumin = 1.8 g/dL at 5/18/2024  7:25 AM, will monitor as appropriate     # Hypertension: Noted on problem list         # Moderate Malnutrition: based on nutrition assessment      # Financial/Environmental Concerns:           Disposition Plan   Medically Ready for Discharge:  on case, will likely need placement.  Vascular surgery has cleared her for discharge.  She is medically stable.  Need for dialysis can be done as an outpatient basis     Manuel Robledo MD  Hospitalist Service  [facility]  Securely message with [employer] (more info)  Text page via [employer] Paging/Directory     I spent 35 minutes total time in management of care today 5/26/2024 reviewing labs, medications, interdisciplinary notes; and completing documentation of encounter and orders with Care Management including counseling/discussion withthe Patient regarding renal function and wounds and Coordinating Care and plan with Nursing and Specialists, Nephrology  regarding LIMA management and surveillance ______________________________________________________________________    Interval History   Patient seen and evaluated in her room today, offers no complaints.  Slept well overnight    No other significant event overnight    Physical Exam   BP (!) 146/60 (BP Location: Left arm)   Pulse 62   Temp 97.8  F (36.6  C) (Oral)   Resp 16   Wt 56.7 kg (125 lb)   LMP  (LMP Unknown)   SpO2 92%   BMI 23.63 kg/m      General/Constitutional: NAD, alert, calm, cooperative  Chest/Respiratory: Respirations nonlabored room air  Gastrointestinal/Abdomen:  soft, nontender,  PureWick, fairly concentrated yet clear urine.  MSK.  Right AKA, stump dressed.  Wound VAC on.  Neuro: No focal deficit  Psych  affect calm      Data   ------------------------- PAST 24 HR DATA REVIEWED  -----------------------------------------------    I have personally reviewed the following data over the past 24 hrs:    3.8 (L)  \   8.3 (L)   / 267     127 (L) 95 (L) 61.9 (H) /  76   5.2 22 3.01 (H) \     ALT: N/A AST: N/A AP: N/A TBILI: N/A   ALB: 1.9 (L) TOT PROTEIN: N/A LIPASE: N/A       Imaging results reviewed over the past 24 hrs:

## 2024-05-27 NOTE — PROGRESS NOTES
"  Diabetes with High Blood Sugar  You have been treated for high blood sugar (hyperglycemia). This may be because of an infection or other illness, eating too many sweets or starches, or not taking enough insulin.  Home care  Monitor and write down your blood sugar level at least twice a day. Do this before breakfast and before dinner. If you take insulin, record your routine insulin dose as well. Also record any additional doses required based on your sliding scale. Do this for the next 3 to 5 days.  High blood sugar may cause symptoms that you can learn to recognize, such as:  · Frequent urination  · Thirst  · Dizziness  · Headache  · Shortness of breath  · Breath that smells fruity  · Nausea or vomiting  · Abdominal pain  · Drowsiness or loss of consciousness  If you have high-blood-sugar symptoms, use a blood or urine test to find out what your blood sugar level is. If it is above your usual range, use the "sliding scale" regular insulin dose prescribed by your healthcare provider. If you were not given a range for your insulin dose, contact your healthcare provider for more advice.  Follow-up care  Follow up with your healthcare provider, or as advised. You may need to meet with your healthcare provider in the next week. You will likely review your blood sugar records together. You may also talk to your provider about adjusting your dose of insulin or other medicine for blood sugar.  When to seek medical advice  Call your healthcare provider right away if these occur:  · High blood sugar symptoms (Symptoms are described above.)  · Blood sugar over 300 mg/dl If you cant reach your healthcare provider, go to a hospital emergency room or urgent care center  Date Last Reviewed: 6/1/2016  © 4547-3206 [employer]. [address] [place] [address], [place] [address]. All rights reserved. This information is not intended as a substitute for professional medical care. Always follow your healthcare professional's " Care Management Follow Up    Length of Stay (days): 12    Expected Discharge Date: 05/28/2024     Concerns to be Addressed: discharge planning     Patient plan of care discussed at interdisciplinary rounds: Yes    Anticipated Discharge Disposition: Transitional Care     Anticipated Discharge Services:  Therapy, wound care  Anticipated Discharge DME:  N/A    Patient/family educated on Medicare website which has current facility and service quality ratings: yes  Education Provided on the Discharge Plan:  N/A  Patient/Family in Agreement with the Plan: yes    Referrals Placed by CM/SW: Post Acute Facilities  Private pay costs discussed: Not applicable    Additional Information:  Message sent to [facility] to follow up on the referral.  They have declined patient as they feel that her acuity is too high and she is not acute rehab appropriate.  Awaiting return calls from Alexey Oneal and [facility].    Will continue to follow.      VIRGIL Rodarte, [facility]    [phone]  [facility]    "instructions.    VIRAL:  Please follow up with your Primary care provider within 2-5 days if your signs and symptoms have not resolved or worsen.  The usual course of cold symptoms are 10-14 days.     If your condition worsens or fails to improve we recommend that you receive another evaluation at the emergency room immediately or contact your primary medical clinic to discuss your concerns.     You must understand that you have received an Urgent Care treatment only and that you may be released before all of your medical problems are known or treated.   You, the patient, will arrange for follow up care as instructed.     Tylenol or Ibuprofen can also be used as directed for pain/fever unless you have an allergy to them or medical condition such as stomach ulcers, kidney or liver disease or blood thinners etc for which you should not be taking these type of medications.     Take over the counter cough medication as directed as needed for cough.  You should avoid medications with pseudoephedrine or phenylephrine (any medication with "D") if you have high blood pressure as this can cause an elevation in your blood pressure. Instead consider Corcidin HBP as needed to prevent an elevated blood pressure.     Natural remedies of symptoms (as needed) include humidification, saline nasal sprays, and/or steamy showers.  Increase fluids, warm tea with honey, cough drops as needed.  You may also use salt water gargles for sore throat.      Middle Ear Infection (Adult)  You have an infection of the middle ear, the space behind the eardrum. This is also called acute otitis media (AOM). Sometimes it is caused by the common cold. This is because congestion can block the internal passage (eustachian tube) that drains fluid from the middle ear. When the middle ear fills with fluid, bacteria can grow there and cause an infection. Oral antibiotics are used to treat this illness, not ear drops. Symptoms usually start to improve within " 1 to 2 days of treatment.    Home care  The following are general care guidelines:  · Finish all of the antibiotic medicine given, even though you may feel better after the first few days.  · You may use over-the-counter medicine, such as acetaminophen or ibuprofen, to control pain and fever, unless something else was prescribed. If you have chronic liver or kidney disease or have ever had a stomach ulcer or gastrointestinal bleeding, talk with your healthcare provider before using these medicines. Do not give aspirin to anyone under 18 years of age who has a fever. It may cause severe illness or death.  Follow-up care  Follow up with your healthcare provider, or as advised, in 2 weeks if all symptoms have not gotten better, or if hearing doesn't go back to normal within 1 month.  When to seek medical advice  Call your healthcare provider right away if any of these occur:  · Ear pain gets worse or does not improve after 3 days of treatment  · Unusual drowsiness or confusion  · Neck pain, stiff neck, or headache  · Fluid or blood draining from the ear canal  · Fever of 100.4°F (38°C) or as advised   · Seizure  Date Last Reviewed: 6/1/2016  © 2184-0617 [employer]. [address] [place] [address], [place] [address]. All rights reserved. This information is not intended as a substitute for professional medical care. Always follow your healthcare professional's instructions.

## 2024-05-27 NOTE — TELEPHONE ENCOUNTER
[facility]    Who is the name of the provider?:  Blake      What is the location you see this provider at?: April    Reason for call:  She has a lump underneath her chin going to her neck.  It has been there for awhile and she noticed it is getting harder.    Please advise next steps.    Can we leave a detailed message on this number?  YES      Patient doing 3 minute vest settings today as he is not tolerating the 5 minutes each. Patient is getting very tired and hot during the vest treatments and requesting to keep vest therapy at 18-20 minutes.

## 2024-05-27 NOTE — PLAN OF CARE
Goal Outcome Evaluation:         [id number]    Diagnosis: I&D Right AKA and placement of wound vac, POD#6  Orientation/Cognitive: A&OX4  Mobility Level: A2 slider board, T/R  Pain Management: tylenol for low grade fever 99.5, recheck 98.3@0609  Tele/VS/O2:VSS @2L  Diet: Gluten free, 2g K+ diet, 1500 fluid restriction  Bowel/Bladder: pure wick in place  Skin Concerns: scattered bruises, sacrum wound/reddened  Drains/Devices: CVC double lumen, Right IJ  Discharge date/time: TCU pending

## 2024-05-27 NOTE — PLAN OF CARE
Date/Time: 5/26/24 4095-8972    Trauma/Ortho/Medical (Choose one) Medical    Diagnosis: Wound Dehiscence of Right AKA  POD#: 6 Washout  Mental Status: A&O  Activity/dangle: Sliding Board  Diet: 2GM K, Gluten Free, FR 1,500ml  Pain: PRN oxy  Alvarez/Voiding: Purewick   Tele/Restraints/Iso: NA  02/LDA: No PIV, Wound Vac to Right Stump  D/C Date: Pending stability of creat and K  Other Info: , K 5.4, Creat 3.09

## 2024-05-28 ENCOUNTER — APPOINTMENT (OUTPATIENT)
Dept: INTERVENTIONAL RADIOLOGY/VASCULAR | Facility: CLINIC | Age: 64
DRG: 464 | End: 2024-05-28
Attending: INTERNAL MEDICINE
Payer: COMMERCIAL

## 2024-05-28 LAB
ALBUMIN SERPL BCG-MCNC: 2.1 G/DL (ref 3.5–5.2)
ANION GAP SERPL CALCULATED.3IONS-SCNC: 13 MMOL/L (ref 7–15)
BASOPHILS # BLD AUTO: 0.1 10E3/UL (ref 0–0.2)
BASOPHILS NFR BLD AUTO: 1 %
BUN SERPL-MCNC: 62.5 MG/DL (ref 8–23)
CALCIUM SERPL-MCNC: 8.4 MG/DL (ref 8.8–10.2)
CHLORIDE SERPL-SCNC: 95 MMOL/L (ref 98–107)
CREAT SERPL-MCNC: 3 MG/DL (ref 0.51–0.95)
DEPRECATED HCO3 PLAS-SCNC: 22 MMOL/L (ref 22–29)
EGFRCR SERPLBLD CKD-EPI 2021: 17 ML/MIN/1.73M2
EOSINOPHIL # BLD AUTO: 0.1 10E3/UL (ref 0–0.7)
EOSINOPHIL NFR BLD AUTO: 3 %
ERYTHROCYTE [DISTWIDTH] IN BLOOD BY AUTOMATED COUNT: 15.5 % (ref 10–15)
GLUCOSE SERPL-MCNC: 92 MG/DL (ref 70–99)
HCT VFR BLD AUTO: 28.6 % (ref 35–47)
HGB BLD-MCNC: 9.2 G/DL (ref 11.7–15.7)
IMM GRANULOCYTES # BLD: 0 10E3/UL
IMM GRANULOCYTES NFR BLD: 0 %
LYMPHOCYTES # BLD AUTO: 1.7 10E3/UL (ref 0.8–5.3)
LYMPHOCYTES NFR BLD AUTO: 34 %
MCH RBC QN AUTO: 30.8 PG (ref 26.5–33)
MCHC RBC AUTO-ENTMCNC: 32.2 G/DL (ref 31.5–36.5)
MCV RBC AUTO: 96 FL (ref 78–100)
MONOCYTES # BLD AUTO: 0.6 10E3/UL (ref 0–1.3)
MONOCYTES NFR BLD AUTO: 11 %
NEUTROPHILS # BLD AUTO: 2.6 10E3/UL (ref 1.6–8.3)
NEUTROPHILS NFR BLD AUTO: 51 %
NRBC # BLD AUTO: 0 10E3/UL
NRBC BLD AUTO-RTO: 0 /100
PHOSPHATE SERPL-MCNC: 5.6 MG/DL (ref 2.5–4.5)
PLATELET # BLD AUTO: 303 10E3/UL (ref 150–450)
POTASSIUM SERPL-SCNC: 4.9 MMOL/L (ref 3.4–5.3)
RBC # BLD AUTO: 2.99 10E6/UL (ref 3.8–5.2)
SODIUM SERPL-SCNC: 130 MMOL/L (ref 135–145)
WBC # BLD AUTO: 5.1 10E3/UL (ref 4–11)

## 2024-05-28 PROCEDURE — 36415 COLL VENOUS BLD VENIPUNCTURE: CPT | Performed by: INTERNAL MEDICINE

## 2024-05-28 PROCEDURE — 250N000011 HC RX IP 250 OP 636

## 2024-05-28 PROCEDURE — 36589 REMOVAL TUNNELED CV CATH: CPT

## 2024-05-28 PROCEDURE — 99232 SBSQ HOSP IP/OBS MODERATE 35: CPT | Performed by: INTERNAL MEDICINE

## 2024-05-28 PROCEDURE — 250N000013 HC RX MED GY IP 250 OP 250 PS 637

## 2024-05-28 PROCEDURE — 120N000001 HC R&B MED SURG/OB

## 2024-05-28 PROCEDURE — 02PA33Z REMOVAL OF INFUSION DEVICE FROM HEART, PERCUTANEOUS APPROACH: ICD-10-PCS | Performed by: PHYSICIAN ASSISTANT

## 2024-05-28 PROCEDURE — 97606 NEG PRS WND THER DME>50 SQCM: CPT

## 2024-05-28 PROCEDURE — 250N000013 HC RX MED GY IP 250 OP 250 PS 637: Performed by: INTERNAL MEDICINE

## 2024-05-28 PROCEDURE — G0463 HOSPITAL OUTPT CLINIC VISIT: HCPCS | Mod: 25

## 2024-05-28 PROCEDURE — 0JPT3XZ REMOVAL OF TUNNELED VASCULAR ACCESS DEVICE FROM TRUNK SUBCUTANEOUS TISSUE AND FASCIA, PERCUTANEOUS APPROACH: ICD-10-PCS | Performed by: PHYSICIAN ASSISTANT

## 2024-05-28 PROCEDURE — 80069 RENAL FUNCTION PANEL: CPT | Performed by: INTERNAL MEDICINE

## 2024-05-28 PROCEDURE — 85025 COMPLETE CBC W/AUTO DIFF WBC: CPT | Performed by: INTERNAL MEDICINE

## 2024-05-28 RX ADMIN — CALCIUM CARBONATE (ANTACID) CHEW TAB 500 MG 500 MG: 500 CHEW TAB at 10:31

## 2024-05-28 RX ADMIN — HEPARIN SODIUM 5000 UNITS: 5000 INJECTION, SOLUTION INTRAVENOUS; SUBCUTANEOUS at 13:48

## 2024-05-28 RX ADMIN — SEVELAMER HYDROCHLORIDE 400 MG: 400 TABLET, FILM COATED ORAL at 17:22

## 2024-05-28 RX ADMIN — Medication 60 ML: at 09:27

## 2024-05-28 RX ADMIN — ROSUVASTATIN CALCIUM 10 MG: 10 TABLET, FILM COATED ORAL at 20:02

## 2024-05-28 RX ADMIN — ACETAMINOPHEN 650 MG: 325 TABLET, FILM COATED ORAL at 02:15

## 2024-05-28 RX ADMIN — HEPARIN SODIUM 5000 UNITS: 5000 INJECTION, SOLUTION INTRAVENOUS; SUBCUTANEOUS at 20:02

## 2024-05-28 RX ADMIN — BUMETANIDE 2 MG: 1 TABLET ORAL at 17:22

## 2024-05-28 RX ADMIN — MICONAZOLE NITRATE: 2 POWDER TOPICAL at 09:32

## 2024-05-28 RX ADMIN — SEVELAMER HYDROCHLORIDE 400 MG: 400 TABLET, FILM COATED ORAL at 09:24

## 2024-05-28 RX ADMIN — HEPARIN SODIUM 5000 UNITS: 5000 INJECTION, SOLUTION INTRAVENOUS; SUBCUTANEOUS at 05:59

## 2024-05-28 RX ADMIN — OXYCODONE HYDROCHLORIDE 5 MG: 5 TABLET ORAL at 20:06

## 2024-05-28 RX ADMIN — MICONAZOLE NITRATE: 2 POWDER TOPICAL at 20:05

## 2024-05-28 RX ADMIN — Medication 1 CAPSULE: at 20:02

## 2024-05-28 RX ADMIN — ONDANSETRON 4 MG: 4 TABLET, ORALLY DISINTEGRATING ORAL at 09:30

## 2024-05-28 RX ADMIN — CLOPIDOGREL BISULFATE 75 MG: 75 TABLET ORAL at 09:24

## 2024-05-28 RX ADMIN — ACETAMINOPHEN 650 MG: 325 TABLET, FILM COATED ORAL at 14:56

## 2024-05-28 RX ADMIN — FAMOTIDINE 20 MG: 20 TABLET, FILM COATED ORAL at 17:22

## 2024-05-28 RX ADMIN — ACETAMINOPHEN 650 MG: 325 TABLET, FILM COATED ORAL at 20:06

## 2024-05-28 RX ADMIN — GABAPENTIN 200 MG: 100 CAPSULE ORAL at 20:02

## 2024-05-28 RX ADMIN — Medication 1 CAPSULE: at 09:24

## 2024-05-28 RX ADMIN — AMLODIPINE BESYLATE 10 MG: 10 TABLET ORAL at 09:24

## 2024-05-28 RX ADMIN — ACETAMINOPHEN 650 MG: 325 TABLET, FILM COATED ORAL at 09:52

## 2024-05-28 RX ADMIN — CARVEDILOL 12.5 MG: 12.5 TABLET, FILM COATED ORAL at 17:22

## 2024-05-28 RX ADMIN — CETIRIZINE HYDROCHLORIDE 5 MG: 5 TABLET ORAL at 09:24

## 2024-05-28 RX ADMIN — CARVEDILOL 12.5 MG: 12.5 TABLET, FILM COATED ORAL at 09:24

## 2024-05-28 RX ADMIN — NICOTINE 1 PATCH: 14 PATCH, EXTENDED RELEASE TRANSDERMAL at 09:27

## 2024-05-28 RX ADMIN — OXYCODONE HYDROCHLORIDE 5 MG: 5 TABLET ORAL at 09:24

## 2024-05-28 ASSESSMENT — ACTIVITIES OF DAILY LIVING (ADL)
ADLS_ACUITY_SCORE: 55
ADLS_ACUITY_SCORE: 49
ADLS_ACUITY_SCORE: 49
ADLS_ACUITY_SCORE: 52
ADLS_ACUITY_SCORE: 51
ADLS_ACUITY_SCORE: 55
ADLS_ACUITY_SCORE: 49
ADLS_ACUITY_SCORE: 49
ADLS_ACUITY_SCORE: 51
ADLS_ACUITY_SCORE: 49
ADLS_ACUITY_SCORE: 53
ADLS_ACUITY_SCORE: 49
ADLS_ACUITY_SCORE: 56
ADLS_ACUITY_SCORE: 49
ADLS_ACUITY_SCORE: 55
ADLS_ACUITY_SCORE: 52
ADLS_ACUITY_SCORE: 55
ADLS_ACUITY_SCORE: 49

## 2024-05-28 NOTE — PLAN OF CARE
Goal Outcome Evaluation:    Overall Patient Progress: improvingOverall Patient Progress: improving    Outcome Evaluation: 1. PO - >75% of meal trays and/or supplements TID   2.   Wound healing    - Discontinued KF order as its no longer being sent  - Continue Gluten Free diet, 2 gm K+ diet, renal multivitamin with minerals, and daily Expedite, as ordered

## 2024-05-28 NOTE — CONSULTS
Interventional Radiology - Progress Note  Inpatient - [facility]  5/28/2024    IR Brief Note    IR consulted for tunneled HD catheter removal as patient no longer undergoing HD (renal recovery). HD catheter exchanged 6 weeks ago. Will perform procedure in IR today.     Vineet Haro PA-C  Interventional Radiology  [phone] (IR)  [phone] ([facility] Office)

## 2024-05-28 NOTE — PROGRESS NOTES
CLINICAL NUTRITION SERVICES - REASSESSMENT NOTE    Recommendations Ordered by Registered Dietitian (RD):   Discontinued KF order as its no longer being sent  Continue Gluten Free diet, 2 gm K+ diet, renal multivitamin with minerals, and daily Expedite, as ordered   Malnutrition: 5/22  % Weight Loss:  None noted  % Intake:  No decreased intake noted  Subcutaneous Fat Loss:  Orbital region mild depletion and Upper arm region mild depletion  Muscle Loss:  Temporal region mild depletion, Clavicle bone region mild depletion, and Dorsal hand region mild depletion  Fluid Retention:  Mild 2+     Malnutrition Diagnosis: Moderate malnutrition  In Context of:  Acute illness or injury      EVALUATION OF PROGRESS TOWARD GOALS   Diet:  Gluten Free diet, 2 gm K+ diet, 1500 ml Fluid restriction, Margarita I Love QC w/ lunch and daily Expedite     Intake/Tolerance:  Pt was busy and then being brought off floor to remove the tunneled HD catheter as she's no longer undergoing dialysis. Spoke w/ bedside RN who reported Pt had a little nausea today but overall has been trying to drink the daily Expedite and RN will continue to provide encouragement.   - Flowsheets show a fair appetite and x1-2 intakes/day of %.   - Health touch shows Margarita Garrison is no longer being sent     ASSESSED NUTRITION NEEDS:  Dosing Weight 56.4 kg (actual)  Estimated Energy Needs: 0064-8375 kcals (25-35 Kcal/Kg)  Justification: wound healing  Estimated Protein Needs: 68-85+ grams protein (1.2-1.5+ g pro/Kg)  Justification: wound healing on LIMA  Estimated Fluid Needs: 1500 ml fluid restriction  Justification: per provider pending fluid status     NEW FINDINGS:   General/Plan: medically stable, discharge pending placement     Weight: stable    GI: Stooling regularly     Skin:   5/28 WOCN, Negative pressure wound therapy applied to: Right AKA and Right Thigh, Wound due to: Surgical Wound, STATUS: improving     Meds: Renal multivitamin with minerals, Expedite    Labs:    Electrolytes  Sodium (mmol/L)   Date Value   05/28/2024 130 (L)   07/09/2021 132 (L)     Potassium (mmol/L)   Date Value   05/28/2024 4.9   12/29/2022 4.3   07/09/2021 5.2     Magnesium (mg/dL)   Date Value   05/14/2024 1.8   07/09/2021 1.7     Phosphorus (mg/dL)   Date Value   05/28/2024 5.6 (H)   07/09/2021 4.0    Blood Glucose  Glucose (mg/dL)   Date Value   05/28/2024 92   12/29/2022 93   07/09/2021 98     GLUCOSE BY METER POCT (mg/dL)   Date Value   05/16/2024 107 (H)     Hemoglobin A1C (%)   Date Value   01/08/2024 5.2   09/23/2020 5.4    Renal  Urea Nitrogen (mg/dL)   Date Value   05/28/2024 62.5 (H)   12/29/2022 17   07/09/2021 13     Creatinine (mg/dL)   Date Value   05/28/2024 3.00 (H)   07/09/2021 0.77         Previous Goals:   PO intake - >75% of meal trays, or the equivalent in supplements, TID   Wound healing  Evaluation: Progressing, ongoing    Previous Nutrition Diagnosis:   Increased nutrient needs (protein) related to wound healing as evidenced by active wound, mild fat loss, mild muscle losses, and need for oral nutrition supplements to help pt meet needs  Evaluation: No change    CURRENT NUTRITION DIAGNOSIS  Increased nutrient needs (protein) related to wound healing as evidenced by active wound, mild fat loss, mild muscle losses, and need for oral nutrition supplements to help pt meet needs    INTERVENTIONS  Recommendations / Nutrition Prescription  Discontinued KF order as its no longer being sent  Continue Gluten Free diet, 2 gm K+ diet, renal multivitamin with minerals, and daily Expedite, as ordered    Implementation  Medical food supplement therapy, collaboration with other providers  General/Healthful diet     Goals  PO - >75% of meal trays and/or supplements TID   2.   Wound healing    MONITORING AND EVALUATION:  Progress towards goals will be monitored and evaluated per protocol and Practice Guideline    Vasquez Wang RD, LD

## 2024-05-28 NOTE — PROGRESS NOTES
Renal Medicine Progress Note                                Shirley Hendricks MRN# [id number]   Age: 63 year old YOB: 1960   Date of Admission: 5/15/2024 Hospital LOS: 13                  Assessment/Plan:     LIMA  Has been HD dependent, but now with some renal recovery. Last HD 5/18. UOP improving. Some nausea.  Uremia ? Cr has reached a plateau.  No acute indications for dialysis right now.   - daily renal panel   - I/os   - monitor symptoms     Will remove TDC     Hyperkalemia, stable   K better.       R AKA wound dehiscence   S/p washout and wound vac   Vascular surgery following. Seems to be healing well.       Interval History:     Comfortable in room  No CP or SOB  No further nausea     ROS:     GENERAL: NAD, No fever,chills  CV: NEGATIVE for chest pain, palpitations  GI: NEGATIVE for abdominal pain, heartburn, nausea, vomiting or change in bowel pattern  EXT: no change edema  ROS otherwise negative    Medications and Allergies:     Reviewed    Physical Exam:     Vitals were reviewed  Patient Vitals for the past 8 hrs:   BP Temp Temp src Pulse Resp SpO2   05/28/24 1031 (!) 166/104 -- -- 69 -- 94 %   05/28/24 0851 (!) 165/66 98.5  F (36.9  C) Oral 67 18 95 %     Wt Readings from Last 4 Encounters:   05/27/24 56.7 kg (125 lb)   05/14/24 57 kg (125 lb 10.6 oz)   04/22/24 50.5 kg (111 lb 5.3 oz)   01/08/24 49.8 kg (109 lb 12.8 oz)     I/O last 3 completed shifts:  In: 120 [P.O.:120]  Out: 1450 [Urine:1450]    Vitals:    05/23/24 0636 05/24/24 0700 05/25/24 0617 05/26/24 0618   Weight: 64.8 kg (142 lb 13.7 oz) 63.6 kg (140 lb 3.4 oz) 63.6 kg (140 lb 3.4 oz) 57.9 kg (127 lb 10.3 oz)    05/27/24 0639   Weight: 56.7 kg (125 lb)         GENERAL: awake, alert, follows  HEENT: NC/AT, PERRLA, EOMI, non icteric, pharynx moist without lesion  RESP:  clear anteriorly  CV: RRR, normal S1 S2  ABDOMEN: soft, nontender, no HSM or masses and bowel sounds normal  MS: no clubbing, cyanosis   SKIN: clear without  significant rashes or lesions  EXT: warm, no edema    Data:     Recent Labs   Lab 05/28/24  0840 05/27/24  0713 05/26/24  0737 05/25/24  0900   * 127* 127* 126*   POTASSIUM 4.9 5.2 5.4* 5.5*   CHLORIDE 95* 95* 95* 92*   CO2 22 22 23 22   ANIONGAP 13 10 9 12   GLC 92 76 76 102*   BUN 62.5* 61.9* 60.8* 57.2*   CR 3.00* 3.01* 3.09* 2.92*   GFRESTIMATED 17* 17* 16* 17*   SONIA 8.4* 8.0* 7.9* 8.1*         Recent Labs   Lab Test 05/28/24  0840 05/27/24  0713 05/26/24  0737 05/25/24  0900 05/24/24  1816 05/23/24  0813 05/22/24  0754 05/21/24  0725 05/20/24  0820 05/19/24  0744   CR 3.00* 3.01* 3.09* 2.92* 2.88* 2.76* 2.51* 2.54* 2.22* 1.95*     Recent Labs   Lab 05/28/24  0840 05/27/24  0713 05/26/24  0737 05/25/24  0900 05/24/24  1816   * 127* 127* 126* 128*     Recent Labs   Lab 05/28/24  0840 05/27/24  0713 05/26/24  0737 05/25/24  0900   ALBUMIN 2.1* 1.9* 1.9* 2.3*     Recent Labs   Lab 05/28/24  0840 05/27/24  0713 05/26/24  0737 05/25/24  0900 05/24/24  1648   PHOS 5.6* 6.0* 6.2* 5.9*  --    HGB 9.2* 8.3*  --   --  9.1*         G Nayan Barboza MD    [facility] - Nephrology  [phone]

## 2024-05-28 NOTE — PROGRESS NOTES
[facility]    Medicine Progress Note - Hospitalist Service    Date of Admission:  5/15/2024    Assessment & Plan   Shirley Hendricks is a 63 year old female with complex medical history which includes anxiety, Charcot- Breonna Tooth disease, GERD, hypertension, hyperlipidemia, lupus, severe peripheral artery disease, GERD, supraventricular tachycardia, asthma presents with wound dehiscence AKA and necrosis that needs debridement.    Recently admitted in the hospital from 3/29/2024 to 4/22/2024 with right ischemic limb, severe peripheral artery disease, ultimately underwent above-knee amputation on the right side, course complicated by acute renal failure, rhabdomyolysis, sepsis, acute blood loss anemia, delirium, diarrhea and retroperitoneal hematoma. She was eventually discharged to acute rehab, now presenting because of  Underwent debridement of the stump on 5/16, cultures thus far negative     R AKA c/b wound dehiscence  S/p wound vac placement  S/p washout 5/20/24  No evidence of a bacterial SSTI of the wound at this time. Wound was apparently necrotic but not overtly infected. Surgical cultures are negative thus far and patient doing well, continues to remain off antibiotics. Wound vac is in place,   Staph capitis from surgical culture is growing. This is a coag negative staph which has low pathogenicity, often a contaminant and unlikely to have caused wound dehiscence.  if no other organisms grow, prior hospitalist states would still opt for no antibiotics at this point.   Repeat washout on 5/20.  Resumed plavix and crestor.  Continue heparin subcu for DVT prophylaxis.  Further management as per the vascular surgery team.  Wound VAC changed today by [facility] nurse    Acute renal failure  Occurred during recent hospital course likely 2/2 rhabdomyolysis and ischemic injury. Has been on HD Tuesday Thursday, Saturday. However, has been having increased urination .  See nephrology note  5/20/2024 making urine.  Per nephrology holding dialysis since 5/21/2024.  -Creatinine is now stable and improving, start to improve from yesterday to 3.01.    I think her creatinine is now plateaued, nephrology is following.  Continues to make urine.  On low potassium diet.    Nephrology is following closely, further recommendation regarding  hemodialysis per nephrology.  She is on Bumex 2 mg daily we will continue with that.     Anemia of chronic kidney disease  Patient has history of acute blood loss anemia, with retroperitoneal hematoma and surgeries.    No active bleeding at this time  -Update CBC in a.m.     Hypertension  Hyperlipidemia  History of coronary artery disease  No active chest pain at this time, blood pressure slightly on the higher side.  She is on PTA amlodipine 10 mg daily, Coreg 12.5 mg twice daily and Bumex, as needed hydralazine    Possible celiac disease  History of loose stools  Patient is noncompliant with gluten-free diet.  Eats, denies pain.  At some point need to follow-up with the GI and counseling.     Asthma  GERD  No acute exacerbation of asthma, continue PTA Breo Ellipta   Continue PTA Pepcid 20 mg daily.     History of diabetes mellitus type 2  Last hemoglobin A1c was 5.2  Her Jardiance was discontinued.   Placed on hypoglycemia protocol.  Currently on diet control,      History of B-cell lymphoma  Followed by [place] [facility]  Continue outpatient surveillance and follow-up with oncology.     Hyponatremia  This is likely secondary to renal failure.  Nephrology to follow,     On Bumex 2 mg daily, improved to 130 today.     Diet: Fluid restriction 1500 ML FLUID  Snacks/Supplements Adult: Margarita Garrison Standard Oral Supplement; With Meals  Combination Diet Gluten Free Diet; 2 gm K Diet    DVT Prophylaxis: Heparin SQ  Alvarez Catheter: Not present  Lines: PRESENT      CVC Double Lumen Right Internal jugular Tunneled-Site Assessment: WDL      Cardiac Monitoring: None  Code Status: Full  Code      Clinically Significant Risk Factors         # Hyponatremia: Lowest Na = 127 mmol/L in last 2 days, will monitor as appropriate      # Hypoalbuminemia: Lowest albumin = 1.8 g/dL at 5/18/2024  7:25 AM, will monitor as appropriate     # Hypertension: Noted on problem list         # Moderate Malnutrition: based on nutrition assessment      # Financial/Environmental Concerns:           Disposition Plan   Medically Ready for Discharge:  on case, will likely need placement.  Vascular surgery has cleared her for discharge.  She is medically stable.  Need for dialysis can be assessed an outpatient basis     Manuel Robledo MD  Hospitalist Service  [facility]  Securely message with [employer] (more info)  Text page via [employer] Live Paging/Directory     I spent 35 minutes total time in management of care today 5/26/2024 reviewing labs, medications, interdisciplinary notes; and completing documentation of encounter and orders with Care Management including counseling/discussion withthe Patient regarding renal function and wounds and Coordinating Care and plan with Nursing and Specialists, Nephrology  regarding LIMA management and surveillance ______________________________________________________________________    Interval History   Had some nausea after eating stephens and breakfast this morning, otherwise no complaint.    No other significant event overnight    Physical Exam   BP (!) 166/104 (BP Location: Right arm)   Pulse 69   Temp 98.5  F (36.9  C) (Oral)   Resp 18   Wt 56.7 kg (125 lb)   LMP  (LMP Unknown)   SpO2 94%   BMI 23.63 kg/m      General/Constitutional: NAD, alert, calm, cooperative  Chest/Respiratory: Respirations nonlabored room air  Gastrointestinal/Abdomen:  soft, nontender,  PureWick, fairly concentrated yet clear urine.  MSK.  Right AKA, stump dressed.  Wound VAC on.  Neuro: No focal deficit  Psych  affect calm      Data   ------------------------- PAST 24 HR DATA  REVIEWED -----------------------------------------------    I have personally reviewed the following data over the past 24 hrs:    5.1  \   9.2 (L)   / 303     130 (L) 95 (L) 62.5 (H) /  92   4.9 22 3.00 (H) \     ALT: N/A AST: N/A AP: N/A TBILI: N/A   ALB: 2.1 (L) TOT PROTEIN: N/A LIPASE: N/A       Imaging results reviewed over the past 24 hrs:

## 2024-05-28 NOTE — PROGRESS NOTES
Alert and oriented x 4. VSS, room air.  Assist of 2 with T/R. Dressing intact/ wound vac patent. CVC R chest. PRN Oxycodone given x1  Voiding adequately. Mepilex to buttocks in place.

## 2024-05-28 NOTE — PROGRESS NOTES
"Care Management Follow Up    Length of Stay (days): 13    Expected Discharge Date: 05/29/2024     Concerns to be Addressed: discharge planning     Patient plan of care discussed at interdisciplinary rounds: Yes    Anticipated Discharge Disposition: Transitional Care     Anticipated Discharge Services:    Anticipated Discharge DME:      Patient/family educated on Medicare website which has current facility and service quality ratings: yes  Education Provided on the Discharge Plan:    Patient/Family in Agreement with the Plan: yes    Referrals Placed by CM/SW: Post Acute Facilities  Private pay costs discussed: Not applicable    Additional Information:  All referrals declined. [facility] declined due to \"no longer ARU appropriate\", [facility] declined due to wound care and [facility] declined due to not having a contract with dialysis. The rest of the facilities noted either full beds or acuity too high.   Writer asked Cely if she would consider at [facility] as they are not ARU and she needs TCU but was told the DON has declined her 3 times.   Message to [facility] indicating that she has a [employer] wound vac and wound cares M/W/Friday per note today and asked if they would reconsider.   Message to [facility] that patient will be having dialysis catheter removed and not doing dialysis.     Additional referral sent to [facility] to consider as patient has had multiple denials including [facility]. [facility] for review.      DAWN Brown      "

## 2024-05-28 NOTE — IR NOTE
Interventional Radiology Intra-procedural Nursing Note    Patient Name: Shirley Hendricks  Medical Record Number: [id number]  Today's Date: May 28, 2024    Procedure: Right TDC removal  Start time: 1427  End time: 1428  Report provided to: MARISSA Sherman  Patient depart time and location: 1435 to Room 2323    Note: Patient entered Interventional Radiology Suite number 1 via cart. Patient awake, alert and oriented. Assisted onto procedural table in supine position. Prepped and draped.  DARREL Estes in room. Time out and procedure started.     Procedure well tolerated by patient without complications. Procedure end with debrief by Giovanni.  Manual pressure applied until hemostasis achieved. Quick clot and tegaderm dressing applied to right interventional procedure access site, dressing is c/d/I.

## 2024-05-28 NOTE — PROGRESS NOTES
VASCULAR SURGERY    Shirley has had a good weekend.  Tolerating diet.    Excellent urine output with stable SCr  Being followed by nephrology--likely will not need hemodialysis in the immediate future.  Will decide on removing tunneled catheter after nephrology rounds on her today.    Wound VAC to right thigh AKA open ulcer and medial suture line has been working well.  Excellent granulation tissue noted  Eventually will require split-thickness skin graft.      Chadwick Lozano MD

## 2024-05-28 NOTE — PLAN OF CARE
Goal Outcome Evaluation:         Date/Time: 05/28/2024 ([id number])    Diagnosis: I and D on RLE  POD#:8  Mental Status: A&O x4  Activity/dangle: Assist of 1-2 with slider board to BSC/ wheelchair  Diet: Gluten Free  Pain: Oxycodone and tylenol  Alvarez/Voiding: Incontinent, use bed pan @ times, external catheter in place, encouraged to use BSC  Tele/Restraints/Iso: NA  02/LDA: no IV access, wound vac dressing changed today per WOC.  D/C Date: Pending  Other Info: internal jugular removed-dressing CDI.

## 2024-05-28 NOTE — PROGRESS NOTES
[facility] Nurse Inpatient Assessment     Consulted for:  5/22/24: New consult for Right AKA/thigh    Summary: Right thigh wound vac changed 5/24. [facility] has signed off on coccyx. Dressing changes to be performed on Tu/F the week of Memorial Day due to the holiday, then will resume MWF dressing changes.     Patient History (according to provider note(s):      63 year old female with complex medical history which include anxiety, charcoaled Breonna tooth disease, GERD, hypertension, hyperlipidemia, lupus, severe peripheral artery disease, GERD, supraventricular tachycardia, asthma, was recently admitted in the hospital from 3/29/2024 to 4/22/2024 with right ischemic limb severe peripheral artery disease, ultimately require above-knee amputation the right side, course complicated by acute renal failure, rhabdomyolysis, sepsis, acute blood loss anemia, delirium, diarrhea and retroperitoneal hematoma will eventually discharged to acute rehab now coming from acute rehab because of wound dehiscence, necrosis mild erythema that needs debridement.     Assessment:      Areas visualized during today's visit: Focused:R AKA/Thigh    Negative pressure wound therapy applied to: Right AKA and Right Thigh  Right anterior leg    Right posterior leg      Wound due to: Surgical Wound   Wound history/plan of care:    Surgical date: 5/20/24   Service following: Vascular  Date Negative Pressure Wound Therapy initiated: 5/20/24   Interventions in place: repositioning, pressure redistribution with device or dressing, specialty surface in use, moisture/incontinence management, offloading, and elevation  Is patient s nutritional status compromised? NO, but reports little appetite  If yes, what interventions are in place? Protein supplements  Reason for initiating vac therapy? Need for accelerated granulation tissue  Which?of?the?following?co-morbidities?apply? Immobility and PAD  If diabetic is patient on a diabetic  management program? N/A   Is osteomyelitis present in wound? no   If yes what treatments are in place? N/A      Wound base: right thigh: Mix of granuar tissue, muscle, slough. , Right medial: 80 % granulation tissue and muscle 20% slough     Palpation of the wound bed: normal       Drainage: small      Volume in cannister: <50ml     Last cannister change date: 5/24/24     Description of drainage: serosanguinous and bloody      Measurements (length x width x depth, in cm) Right Thigh: 14 x 7 x 0.5cm Right medial AKA: 1.7 (positional) x 2.5 x 1cm       Tunneling N/A      Undermining up to 0.2 cm from 11 o'clock   Periwound skin: Edematous and Erythema- blanchable       Color: pink       Temperature: normal    Odor: none   Pain: moderate, during tape removal aching, sharp, and right-sided   Pain intervention prior to dressing change: oral oxycodone, soaking, and slow and gentle cares   Treatment goal: Heal , Drainage control, Infection control/prevention, Increase granulation, Maintain (prevention of deterioration), Pain control, Protection, and Promote epidermal migration  STATUS: improving   Supplies ordered: gathered, supplies stored on unit, and discussed with patient     Number of foam pieces removed from a wound (excluding foam for bridge) :  3 GranuFoam Black and 2 Oil emulsion dressing   Verified this matched the number of foam pieces applied last dressing change: Yes   Number of foam pieces packed into wound (excluding foam for bridge) :  4 GranuFoam Black            Treatment Plan:     Negative pressure wound therapy plan:  Wound location: Right thigh and right AKA   Change Days: Tu/F the week of 5/28 then  Mon/Wed/Fri by WOC RN    Supplies (including all accessories) used: large Black foam , Adaptic/Curity oil emulsion contact layer , and Cavilon no sting barrier film  Cleanse with MicroKlenz prior to replacing NPWT  Suction setting: -125   Methods used: Window paned all periwound skin with vac drape prior  "to applying sponge, Bridged trac pad off bony prominences, Spiral cut foam prior to packing into wound , and Cut foam in half to reduce profile    Staff RN to assess integrity of dressing and ensure suction is set at appropriate level every shift.   Date canister. Chart canister output every shift. Change cannister weekly and PRN if full/occluded     Remove foam dressing and replace with BID normal saline moist gauze dressing if:   -a dressing failure which cannot be repaired within 2 hours   -patient is discharging to home without a home pump   -patient is discharging to a facility outside the local area   -if a dressing is a \"Silver Foam\", remove before Radiation Therapy or MRI     The hospital VAC pump is not to be discharged with the patient.?Ensure to disconnect patient from machine prior to discharge. Then,    - If a home KCI VAC pump has been delivered, connect home cannister to dressing tubing then connect cannister to home pump and turn on machine    - If transferring to a nearby facility with a KCI vac, can disconnect and clamp tubing then cover end with glove so can be reconnected within 2 hours       Coccyx/Sacrum - newly healed area: Apply Sacral Mepilex every other day and prn if soiled or damaged     Pressure Injury Prevention (PIP) Plan:  If patient is declining pressure injury prevention interventions: Explore reason why and address patient's concerns, Educate on pressure injury risk and prevention intervention(s), If patient is still declining, document \"informed refusal\" , and Ensure Care team is aware ( provider, charge nurse, etc)  Mattress: Follow bed algorithm, reassess daily and order specialty mattress, if indicated.  HOB: Maintain at or below 30 degrees, unless contraindicated  Repositioning in bed: Every 1-2 hours , Left/right positioning; avoid supine, and Raise foot of bed prior to raising head of bed, to reduce patient sliding down (shear)  Heels: Keep elevated off mattress and Pillows " under calves  Protective Dressing: Sacral Mepilex for prevention (#[id number]),  especially for the agitated patient   Positioning Equipment:None  Chair positioning: Repositions self: patient to shift weight every 15 minutes, Assist patient to reposition hourly, and Do NOT use a donut for sitting (this increases pressure to smaller area and creates a higher potential for injury)    If patient has a buttock pressure injury, or high risk for PI use chair cushion or SPS.  Moisture Management: Perineal cleansing /protection: Follow Incontinence Protocol, Avoid brief in bed, Clean and dry skin folds with bathing , and Moisturize dry skin  Under Devices: Inspect skin under all medical devices during skin inspection , Ensure tubes are stabilized without tension, and Ensure patient is not lying on medical devices or equipment when repositioned  Ask provider to discontinue device when no longer needed.     Orders: Reviewed, Written, and Updated    RECOMMEND PRIMARY TEAM ORDER: None, at this time  Education provided: importance of repositioning, plan of care, wound progress, and Off-loading pressure  Discussed plan of care with: Patient and Nurse  WOC nurse follow-up plan: Monday/WednesdayFriday  Notify WOC if wound(s) deteriorate.  Nursing to notify the Provider(s) and re-consult the WOC Nurse if new skin concern.    DATA:     Current support surface: Standard  Standard Isoflex gel  Containment of urine/stool: Incontinent pad in bed  BMI: Body mass index is 23.63 kg/m .   Active diet order: Orders Placed This Encounter      Combination Diet Gluten Free Diet; 2 gm K Diet     Output: I/O last 3 completed shifts:  In: 120 [P.O.:120]  Out: 1450 [Urine:1450]     Labs:   Recent Labs   Lab 05/28/24  0840   ALBUMIN 2.1*   HGB 9.2*   WBC 5.1     Pressure injury risk assessment:   Sensory Perception: 3-->slightly limited  Moisture: 3-->occasionally moist  Activity: 2-->chairfast  Mobility: 2-->very limited  Nutrition: 2-->probably  inadequate  Friction and Shear: 2-->potential problem  Cesar Score: 14    Marleen Berger CWOCN   Dept. Vocera- Contact [facility] Nurse (Tracy) via  Vocera   Dept. Office Number: [phone]

## 2024-05-28 NOTE — PROGRESS NOTES
Interventional Radiology - Progress Note  Inpatient - [facility]  5/28/2024    Interventional Radiology Brief Post Procedure Note    Procedure: Tunneled dialysis catheter removal    Proceduralist: Estrellita GALVAN    Assistant: None    Time Out:  Immediately prior to starting the procedure I conducted the Time Out and re-confirmed the patient s name, procedure, and site/side.     Sedation: None. Local Anesthestic used    Findings: Rt Chest catheter tunneled to RIJ. Gentle traction used to release catheter cuff. Catheter removed under sterile field while pressure held at two points along tunnel until hemostasis obtained.    Estimated Blood Loss: Minimal    SPECIMENS: None    Complications:  None     Condition: Stable    Plan: Return to floor    Comments: See dictated procedure note for full details.    Vineet Haro PA-C

## 2024-05-29 ENCOUNTER — HOSPITAL ENCOUNTER (INPATIENT)
Facility: SKILLED NURSING FACILITY | Age: 64
LOS: 10 days | Discharge: HOME HEALTH | DRG: 565 | End: 2024-06-08
Attending: INTERNAL MEDICINE | Admitting: INTERNAL MEDICINE
Payer: COMMERCIAL

## 2024-05-29 ENCOUNTER — APPOINTMENT (OUTPATIENT)
Dept: OCCUPATIONAL THERAPY | Facility: CLINIC | Age: 64
DRG: 464 | End: 2024-05-29
Attending: INTERNAL MEDICINE
Payer: COMMERCIAL

## 2024-05-29 VITALS
RESPIRATION RATE: 16 BRPM | HEART RATE: 63 BPM | TEMPERATURE: 97.9 F | WEIGHT: 137.35 LBS | DIASTOLIC BLOOD PRESSURE: 63 MMHG | OXYGEN SATURATION: 92 % | BODY MASS INDEX: 25.97 KG/M2 | SYSTOLIC BLOOD PRESSURE: 150 MMHG

## 2024-05-29 DIAGNOSIS — I73.9 PAD (PERIPHERAL ARTERY DISEASE) (H): ICD-10-CM

## 2024-05-29 DIAGNOSIS — R11.0 NAUSEA: ICD-10-CM

## 2024-05-29 DIAGNOSIS — L08.9 WOUND INFECTION: ICD-10-CM

## 2024-05-29 DIAGNOSIS — T14.8XXA WOUND INFECTION: ICD-10-CM

## 2024-05-29 DIAGNOSIS — R15.9 FECAL INCONTINENCE ALTERNATING WITH CONSTIPATION: ICD-10-CM

## 2024-05-29 DIAGNOSIS — Z46.89 ENCOUNTER FOR MANAGEMENT OF WOUND VAC: Primary | ICD-10-CM

## 2024-05-29 DIAGNOSIS — K21.00 GASTROESOPHAGEAL REFLUX DISEASE WITH ESOPHAGITIS, UNSPECIFIED WHETHER HEMORRHAGE: ICD-10-CM

## 2024-05-29 DIAGNOSIS — T87.40 AMPUTATION STUMP INFECTION (H): ICD-10-CM

## 2024-05-29 DIAGNOSIS — I73.9 PERIPHERAL VASCULAR DISEASE (H): ICD-10-CM

## 2024-05-29 DIAGNOSIS — F41.9 ANXIETY: ICD-10-CM

## 2024-05-29 DIAGNOSIS — I10 PRIMARY HYPERTENSION: ICD-10-CM

## 2024-05-29 DIAGNOSIS — N19 RENAL FAILURE, UNSPECIFIED CHRONICITY: ICD-10-CM

## 2024-05-29 DIAGNOSIS — Z99.2 DIALYSIS PATIENT (H): ICD-10-CM

## 2024-05-29 DIAGNOSIS — Z89.611 S/P AKA (ABOVE KNEE AMPUTATION) UNILATERAL, RIGHT (H): ICD-10-CM

## 2024-05-29 DIAGNOSIS — J44.9 CHRONIC OBSTRUCTIVE PULMONARY DISEASE, UNSPECIFIED COPD TYPE (H): ICD-10-CM

## 2024-05-29 DIAGNOSIS — F17.200 TOBACCO USE DISORDER: ICD-10-CM

## 2024-05-29 DIAGNOSIS — J30.81 CHRONIC ALLERGIC RHINITIS DUE TO ANIMAL HAIR AND DANDER: ICD-10-CM

## 2024-05-29 DIAGNOSIS — I10 BENIGN ESSENTIAL HYPERTENSION: ICD-10-CM

## 2024-05-29 DIAGNOSIS — K59.00 FECAL INCONTINENCE ALTERNATING WITH CONSTIPATION: ICD-10-CM

## 2024-05-29 DIAGNOSIS — E78.5 HYPERLIPIDEMIA LDL GOAL <70: ICD-10-CM

## 2024-05-29 LAB
ALBUMIN SERPL BCG-MCNC: 2 G/DL (ref 3.5–5.2)
ANION GAP SERPL CALCULATED.3IONS-SCNC: 10 MMOL/L (ref 7–15)
BUN SERPL-MCNC: 59.4 MG/DL (ref 8–23)
CALCIUM SERPL-MCNC: 8.2 MG/DL (ref 8.8–10.2)
CHLORIDE SERPL-SCNC: 96 MMOL/L (ref 98–107)
CREAT SERPL-MCNC: 2.85 MG/DL (ref 0.51–0.95)
DEPRECATED HCO3 PLAS-SCNC: 23 MMOL/L (ref 22–29)
EGFRCR SERPLBLD CKD-EPI 2021: 18 ML/MIN/1.73M2
GLUCOSE SERPL-MCNC: 70 MG/DL (ref 70–99)
PHOSPHATE SERPL-MCNC: 5.7 MG/DL (ref 2.5–4.5)
POTASSIUM SERPL-SCNC: 4.8 MMOL/L (ref 3.4–5.3)
SODIUM SERPL-SCNC: 129 MMOL/L (ref 135–145)

## 2024-05-29 PROCEDURE — 120N000009 HC R&B SNF

## 2024-05-29 PROCEDURE — 250N000013 HC RX MED GY IP 250 OP 250 PS 637: Performed by: PHYSICIAN ASSISTANT

## 2024-05-29 PROCEDURE — 80069 RENAL FUNCTION PANEL: CPT | Performed by: INTERNAL MEDICINE

## 2024-05-29 PROCEDURE — 250N000013 HC RX MED GY IP 250 OP 250 PS 637: Performed by: INTERNAL MEDICINE

## 2024-05-29 PROCEDURE — 99232 SBSQ HOSP IP/OBS MODERATE 35: CPT | Performed by: INTERNAL MEDICINE

## 2024-05-29 PROCEDURE — 250N000013 HC RX MED GY IP 250 OP 250 PS 637

## 2024-05-29 PROCEDURE — 36415 COLL VENOUS BLD VENIPUNCTURE: CPT | Performed by: INTERNAL MEDICINE

## 2024-05-29 PROCEDURE — 250N000011 HC RX IP 250 OP 636

## 2024-05-29 PROCEDURE — 250N000011 HC RX IP 250 OP 636: Performed by: PHYSICIAN ASSISTANT

## 2024-05-29 PROCEDURE — 97535 SELF CARE MNGMENT TRAINING: CPT | Mod: GO | Performed by: OCCUPATIONAL THERAPIST

## 2024-05-29 RX ORDER — AMLODIPINE BESYLATE 10 MG/1
10 TABLET ORAL DAILY
Status: DISCONTINUED | OUTPATIENT
Start: 2024-05-30 | End: 2024-06-08 | Stop reason: HOSPADM

## 2024-05-29 RX ORDER — SEVELAMER HYDROCHLORIDE 400 MG/1
400 TABLET, FILM COATED ORAL
Status: ON HOLD | DISCHARGE
Start: 2024-05-29 | End: 2024-06-07

## 2024-05-29 RX ORDER — NALOXONE HYDROCHLORIDE 0.4 MG/ML
0.4 INJECTION, SOLUTION INTRAMUSCULAR; INTRAVENOUS; SUBCUTANEOUS
Status: DISCONTINUED | OUTPATIENT
Start: 2024-05-29 | End: 2024-06-08 | Stop reason: HOSPADM

## 2024-05-29 RX ORDER — BUMETANIDE 2 MG/1
4 TABLET ORAL DAILY
Status: ON HOLD | DISCHARGE
Start: 2024-05-29 | End: 2024-06-07

## 2024-05-29 RX ORDER — CLOPIDOGREL BISULFATE 75 MG/1
75 TABLET ORAL DAILY
Status: DISCONTINUED | OUTPATIENT
Start: 2024-05-30 | End: 2024-06-08 | Stop reason: HOSPADM

## 2024-05-29 RX ORDER — POLYETHYLENE GLYCOL 3350 17 G/17G
17 POWDER, FOR SOLUTION ORAL DAILY PRN
Status: DISCONTINUED | OUTPATIENT
Start: 2024-05-29 | End: 2024-06-08 | Stop reason: HOSPADM

## 2024-05-29 RX ORDER — GABAPENTIN 100 MG/1
200 CAPSULE ORAL
Status: DISCONTINUED | OUTPATIENT
Start: 2024-05-30 | End: 2024-05-30

## 2024-05-29 RX ORDER — LACTOBACILLUS RHAMNOSUS GG 10B CELL
1 CAPSULE ORAL 2 TIMES DAILY
Status: DISCONTINUED | OUTPATIENT
Start: 2024-05-29 | End: 2024-06-08 | Stop reason: HOSPADM

## 2024-05-29 RX ORDER — ONDANSETRON 4 MG/1
4 TABLET, ORALLY DISINTEGRATING ORAL EVERY 6 HOURS PRN
Status: DISCONTINUED | OUTPATIENT
Start: 2024-05-29 | End: 2024-06-08 | Stop reason: HOSPADM

## 2024-05-29 RX ORDER — ROSUVASTATIN CALCIUM 5 MG/1
10 TABLET, COATED ORAL AT BEDTIME
Status: DISCONTINUED | OUTPATIENT
Start: 2024-05-29 | End: 2024-06-08 | Stop reason: HOSPADM

## 2024-05-29 RX ORDER — DIPHENOXYLATE HCL/ATROPINE 2.5-.025MG
1 TABLET ORAL 4 TIMES DAILY PRN
Status: DISCONTINUED | OUTPATIENT
Start: 2024-05-29 | End: 2024-06-08 | Stop reason: HOSPADM

## 2024-05-29 RX ORDER — ACETAMINOPHEN 500 MG
500-1000 TABLET ORAL EVERY 6 HOURS PRN
Status: DISCONTINUED | OUTPATIENT
Start: 2024-05-29 | End: 2024-06-08 | Stop reason: HOSPADM

## 2024-05-29 RX ORDER — FLUTICASONE FUROATE AND VILANTEROL 200; 25 UG/1; UG/1
1 POWDER RESPIRATORY (INHALATION) DAILY
Status: DISCONTINUED | OUTPATIENT
Start: 2024-05-30 | End: 2024-06-08 | Stop reason: HOSPADM

## 2024-05-29 RX ORDER — HYDROMORPHONE HYDROCHLORIDE 2 MG/1
2 TABLET ORAL
Status: DISCONTINUED | OUTPATIENT
Start: 2024-05-29 | End: 2024-05-30

## 2024-05-29 RX ORDER — NICOTINE 21 MG/24HR
1 PATCH, TRANSDERMAL 24 HOURS TRANSDERMAL DAILY
Status: DISCONTINUED | OUTPATIENT
Start: 2024-05-29 | End: 2024-06-08 | Stop reason: HOSPADM

## 2024-05-29 RX ORDER — FAMOTIDINE 20 MG/1
20 TABLET, FILM COATED ORAL
Status: DISCONTINUED | OUTPATIENT
Start: 2024-05-30 | End: 2024-06-08 | Stop reason: HOSPADM

## 2024-05-29 RX ORDER — BUMETANIDE 2 MG/1
2 TABLET ORAL DAILY
Status: DISCONTINUED | OUTPATIENT
Start: 2024-05-29 | End: 2024-06-08 | Stop reason: HOSPADM

## 2024-05-29 RX ORDER — NALOXONE HYDROCHLORIDE 0.4 MG/ML
0.2 INJECTION, SOLUTION INTRAMUSCULAR; INTRAVENOUS; SUBCUTANEOUS
Status: DISCONTINUED | OUTPATIENT
Start: 2024-05-29 | End: 2024-06-08 | Stop reason: HOSPADM

## 2024-05-29 RX ORDER — CARVEDILOL 12.5 MG/1
12.5 TABLET ORAL 2 TIMES DAILY WITH MEALS
Status: DISCONTINUED | OUTPATIENT
Start: 2024-05-29 | End: 2024-06-08 | Stop reason: HOSPADM

## 2024-05-29 RX ORDER — CARVEDILOL 12.5 MG/1
12.5 TABLET ORAL 2 TIMES DAILY WITH MEALS
Status: ON HOLD | DISCHARGE
Start: 2024-05-29 | End: 2024-06-07

## 2024-05-29 RX ORDER — CETIRIZINE HYDROCHLORIDE 5 MG/1
5 TABLET ORAL DAILY
Status: DISCONTINUED | OUTPATIENT
Start: 2024-05-30 | End: 2024-06-08 | Stop reason: HOSPADM

## 2024-05-29 RX ORDER — SEVELAMER HYDROCHLORIDE 400 MG/1
400 TABLET, FILM COATED ORAL
Status: DISCONTINUED | OUTPATIENT
Start: 2024-05-29 | End: 2024-06-08 | Stop reason: HOSPADM

## 2024-05-29 RX ORDER — HEPARIN SODIUM 5000 [USP'U]/.5ML
5000 INJECTION, SOLUTION INTRAVENOUS; SUBCUTANEOUS EVERY 8 HOURS
Status: DISCONTINUED | OUTPATIENT
Start: 2024-05-29 | End: 2024-06-08 | Stop reason: HOSPADM

## 2024-05-29 RX ORDER — B COMPLEX, C NO.20/FOLIC ACID 1 MG
1 CAPSULE ORAL DAILY
Status: DISCONTINUED | OUTPATIENT
Start: 2024-05-30 | End: 2024-06-08 | Stop reason: HOSPADM

## 2024-05-29 RX ORDER — POLYETHYLENE GLYCOL 3350 17 G/17G
17 POWDER, FOR SOLUTION ORAL DAILY PRN
Status: DISCONTINUED | OUTPATIENT
Start: 2024-05-29 | End: 2024-05-29 | Stop reason: HOSPADM

## 2024-05-29 RX ADMIN — SEVELAMER HYDROCHLORIDE 400 MG: 400 TABLET, FILM COATED ORAL at 09:07

## 2024-05-29 RX ADMIN — BUMETANIDE 2 MG: 2 TABLET ORAL at 18:51

## 2024-05-29 RX ADMIN — HEPARIN SODIUM 5000 UNITS: 5000 INJECTION, SOLUTION INTRAVENOUS; SUBCUTANEOUS at 05:40

## 2024-05-29 RX ADMIN — OXYCODONE HYDROCHLORIDE 5 MG: 5 TABLET ORAL at 03:15

## 2024-05-29 RX ADMIN — Medication 1 CAPSULE: at 20:30

## 2024-05-29 RX ADMIN — HYDROMORPHONE HYDROCHLORIDE 2 MG: 2 TABLET ORAL at 20:30

## 2024-05-29 RX ADMIN — AMLODIPINE BESYLATE 10 MG: 10 TABLET ORAL at 09:07

## 2024-05-29 RX ADMIN — CLOPIDOGREL BISULFATE 75 MG: 75 TABLET ORAL at 09:06

## 2024-05-29 RX ADMIN — NICOTINE 1 PATCH: 14 PATCH, EXTENDED RELEASE TRANSDERMAL at 09:08

## 2024-05-29 RX ADMIN — CARVEDILOL 12.5 MG: 12.5 TABLET, FILM COATED ORAL at 09:07

## 2024-05-29 RX ADMIN — Medication 1 CAPSULE: at 13:40

## 2024-05-29 RX ADMIN — MICONAZOLE NITRATE: 2 POWDER TOPICAL at 09:07

## 2024-05-29 RX ADMIN — Medication 1 CAPSULE: at 09:07

## 2024-05-29 RX ADMIN — ONDANSETRON 4 MG: 4 TABLET, ORALLY DISINTEGRATING ORAL at 19:48

## 2024-05-29 RX ADMIN — SEVELAMER HYDROCHLORIDE 400 MG: 400 TABLET, FILM COATED ORAL at 13:39

## 2024-05-29 RX ADMIN — HEPARIN SODIUM 5000 UNITS: 5000 INJECTION, SOLUTION INTRAVENOUS; SUBCUTANEOUS at 19:49

## 2024-05-29 RX ADMIN — HEPARIN SODIUM 5000 UNITS: 5000 INJECTION, SOLUTION INTRAVENOUS; SUBCUTANEOUS at 13:40

## 2024-05-29 RX ADMIN — CETIRIZINE HYDROCHLORIDE 5 MG: 5 TABLET ORAL at 09:07

## 2024-05-29 RX ADMIN — ACETAMINOPHEN 650 MG: 325 TABLET, FILM COATED ORAL at 13:45

## 2024-05-29 RX ADMIN — ROSUVASTATIN CALCIUM 10 MG: 5 TABLET, FILM COATED ORAL at 22:05

## 2024-05-29 RX ADMIN — FLUTICASONE FUROATE AND VILANTEROL TRIFENATATE 1 PUFF: 200; 25 POWDER RESPIRATORY (INHALATION) at 09:07

## 2024-05-29 RX ADMIN — Medication 60 ML: at 09:08

## 2024-05-29 RX ADMIN — MICONAZOLE NITRATE: 20 POWDER TOPICAL at 22:07

## 2024-05-29 ASSESSMENT — ACTIVITIES OF DAILY LIVING (ADL)
ADLS_ACUITY_SCORE: 61
ADLS_ACUITY_SCORE: 49
ADLS_ACUITY_SCORE: 50
ADLS_ACUITY_SCORE: 54
ADLS_ACUITY_SCORE: 37
ADLS_ACUITY_SCORE: 49
ADLS_ACUITY_SCORE: 54
ADLS_ACUITY_SCORE: 49
ADLS_ACUITY_SCORE: 49
ADLS_ACUITY_SCORE: 37
ADLS_ACUITY_SCORE: 49
ADLS_ACUITY_SCORE: 54
ADLS_ACUITY_SCORE: 37
ADLS_ACUITY_SCORE: 54
ADLS_ACUITY_SCORE: 37
ADLS_ACUITY_SCORE: 37
ADLS_ACUITY_SCORE: 49
ADLS_ACUITY_SCORE: 54
ADLS_ACUITY_SCORE: 40
ADLS_ACUITY_SCORE: 49

## 2024-05-29 NOTE — PHARMACY-ADMISSION MEDICATION HISTORY
Admission Medication History completed by pharmacist, Yanira Mario on 5/15/2024. Please see Pharmacy - Admission Medication History note under previous encounter at [facility]. for information regarding prior to admission medications.    Eleni Coffey, PharmD   Clinical Pharmacist

## 2024-05-29 NOTE — PLAN OF CARE
Date/Time: 5/28/24 1209-1313    Trauma/Ortho/Medical (Choose one)  Medical     Diagnosis: Wound Dehiscence   POD#: 8 Washout  Mental Status: A&O  Activity/dangle: Sliding Board   Diet: Gluten Free, 2 GM K, FR 1,500ml  Pain: PRN oxy  Alvarez/Voiding: Purewick   Tele/Restraints/Iso: NA  02/LDA: Dialysis line removed today, No PIV, Wound Vac changed at bedside today  D/C Date: Pending TCU placement   Other Info: Frequent loose stools today, , Creat 3.0

## 2024-05-29 NOTE — PROVIDER NOTIFICATION
MD Notification    Notified Person: MD    Notified Person Name: Dr. Lozano    Notification Date/Time:05/29/2024 @ 1110    Notification Interaction: [facility]    Purpose of Notification:[id number]- Hendricks, patient is discharging to [facility] @ 1600. requesting today's note from vascular that patient is ready for discharge. Thank you.    Orders Received:    Comments:

## 2024-05-29 NOTE — PROGRESS NOTES
Vascular Surgery Progress Note    Shirley has had a good weekend.  Tolerating diet.     Excellent urineutput with stable SCr     Wound VAC to right thigh AKA open ulcer and medial suture line has been working well.  Excellent granulation tissue noted  Eventually will require split-thickness skin graft.    From Vascular Surgery standpoint, ok to discharge to [facility]    Kaylee iLu, Vascular Surgery NP
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
## 2024-05-29 NOTE — PLAN OF CARE
Occupational Therapy Discharge Summary    Reason for therapy discharge:    Discharged to transitional care facility.    Progress towards therapy goal(s). See goals on Care Plan in [facility] electronic health record for goal details.  Goals partially met.  Barriers to achieving goals:   discharge from facility.    Therapy recommendation(s):    Continued therapy is recommended.  Rationale/Recommendations:  TCU to increase ADL independence and safety.

## 2024-05-29 NOTE — PROGRESS NOTES
Care Management Discharge Note    Discharge Date: 05/29/2024       Discharge Disposition: Transitional Care    Discharge Services:      Discharge DME:      Discharge Transportation: family or friend will provide, agency    Private pay costs discussed: Not applicable    Does the patient's insurance plan have a 3 day qualifying hospital stay waiver?  No    PAS Confirmation Code:    Patient/family educated on Medicare website which has current facility and service quality ratings: yes    Education Provided on the Discharge Plan:    Persons Notified of Discharge Plans: [facility], Bedside RN, charge RN, [facility]ha [facility].  Patient/Family in Agreement with the Plan: yes    Handoff Referral Completed: yes    Additional Information:  See writer's earlier note for details regarding discharge planning.     SURYA Cook  Social Work  [facility]

## 2024-05-29 NOTE — PROGRESS NOTES
Renal Medicine Progress Note                                Shirley Hendricks MRN# [id number]   Age: 63 year old YOB: 1960   Date of Admission: 5/15/2024 Hospital LOS: 14                  Assessment/Plan:     LIMA  Has been HD dependent, but now with some renal recovery. Last HD 5/18. UOP improving. Some nausea.  Uremia ? Cr has reached a plateau.  No acute indications for dialysis right now.   - daily renal panel   - I/os   - monitor symptoms     Will remove TDC     Hyperkalemia, stable   K better.       R AKA wound dehiscence   S/p washout and wound vac   Vascular surgery following. Seems to be healing well.       Creatinine stable  TDC out    Will schedule renal follow-up 2 to 3 weeks       Interval History:     Comfortable in room  No CP or SOB  No further nausea   Pleasant     ROS:     GENERAL: NAD, No fever,chills  CV: NEGATIVE for chest pain, palpitations  GI: NEGATIVE for abdominal pain, heartburn, nausea, vomiting or change in bowel pattern  EXT: no change edema  ROS otherwise negative    Medications and Allergies:     Reviewed    Physical Exam:     Vitals were reviewed  Patient Vitals for the past 8 hrs:   BP Temp Temp src Pulse Resp SpO2 Weight   05/29/24 0900 -- -- -- -- -- -- 62.3 kg (137 lb 5.6 oz)   05/29/24 0847 (!) 150/63 97.9  F (36.6  C) Oral 63 16 92 % --     Wt Readings from Last 4 Encounters:   05/29/24 62.3 kg (137 lb 5.6 oz)   05/14/24 57 kg (125 lb 10.6 oz)   04/22/24 50.5 kg (111 lb 5.3 oz)   01/08/24 49.8 kg (109 lb 12.8 oz)     I/O last 3 completed shifts:  In: 660 [P.O.:660]  Out: 500 [Urine:500]    Vitals:    05/25/24 0617 05/26/24 0618 05/27/24 0639 05/28/24 0858   Weight: 63.6 kg (140 lb 3.4 oz) 57.9 kg (127 lb 10.3 oz) 56.7 kg (125 lb) 62.1 kg (136 lb 14.5 oz)    05/29/24 0900   Weight: 62.3 kg (137 lb 5.6 oz)         GENERAL: awake, alert, follows  HEENT: NC/AT, PERRLA, EOMI, non icteric, pharynx moist without lesion  RESP:  clear anteriorly  CV: RRR, normal S1  S2  ABDOMEN: soft, nontender, no HSM or masses and bowel sounds normal  MS: no clubbing, cyanosis   SKIN: clear without significant rashes or lesions  EXT: warm, no edema    Data:     Recent Labs   Lab 05/29/24  0732 05/28/24  0840 05/27/24  0713 05/26/24  0737   * 130* 127* 127*   POTASSIUM 4.8 4.9 5.2 5.4*   CHLORIDE 96* 95* 95* 95*   CO2 23 22 22 23   ANIONGAP 10 13 10 9   GLC 70 92 76 76   BUN 59.4* 62.5* 61.9* 60.8*   CR 2.85* 3.00* 3.01* 3.09*   GFRESTIMATED 18* 17* 17* 16*   SONIA 8.2* 8.4* 8.0* 7.9*         Recent Labs   Lab Test 05/29/24  0732 05/28/24  0840 05/27/24  0713 05/26/24  0737 05/25/24  0900 05/24/24  1816 05/23/24  0813 05/22/24  0754 05/21/24  0725 05/20/24  0820   CR 2.85* 3.00* 3.01* 3.09* 2.92* 2.88* 2.76* 2.51* 2.54* 2.22*     Recent Labs   Lab 05/29/24  0732 05/28/24  0840 05/27/24  0713 05/26/24  0737 05/25/24  0900   * 130* 127* 127* 126*     Recent Labs   Lab 05/29/24  0732 05/28/24  0840 05/27/24  0713 05/26/24  0737   ALBUMIN 2.0* 2.1* 1.9* 1.9*     Recent Labs   Lab 05/29/24  0732 05/28/24  0840 05/27/24  0713 05/26/24  0737 05/25/24  0900 05/24/24  1648   PHOS 5.7* 5.6* 6.0* 6.2*   < >  --    HGB  --  9.2* 8.3*  --   --  9.1*    < > = values in this interval not displayed.         ELYSSA Barboza MD    [facility] - Nephrology  [phone]

## 2024-05-29 NOTE — PLAN OF CARE
Goal Outcome Evaluation:         Patient is A&O x4. Up with assist of 1-2/GB/sliding board to chair. Denies chest pain or SOB. Pain managed with tylenol. Wound vac dressing patent- tube covered with sterile dressing @ the time of discharge, will reconnect @ TCU per CC RN.  Incontinent of bowel and bladder, used bedpan @ times. Up in chair this afternoon- needs encouragement. No IV access.  Gluten Free diet with 1500 mL fluid Restriction. Discharging to TCU with discharge packet and belonging via wheelchair transportation. Family taking some belonging home.

## 2024-05-29 NOTE — DISCHARGE SUMMARY
[facility]    Discharge Summary  Hospitalist    Date of Admission:  5/15/2024  Date of Discharge:  5/29/2024  Discharging Provider: Manuel Robledo MD, MD  Date of Service (when I saw the patient): 05/29/24    Discharge Diagnoses   Please refer below    History of Present Illness   Shirley Hendricks is an 63 year old female who presented with leg wound    Hospital Course   Shirley Hendricks is a 63 year old female with complex medical history which includes anxiety, Charcot- Breonna Tooth disease, GERD, hypertension, hyperlipidemia, lupus, severe peripheral artery disease, GERD, supraventricular tachycardia, asthma presents with wound dehiscence AKA and necrosis that needs debridement.     Recently admitted in the hospital from 3/29/2024 to 4/22/2024 with right ischemic limb, severe peripheral artery disease, ultimately underwent above-knee amputation on the right side, course complicated by acute renal failure, rhabdomyolysis, sepsis, acute blood loss anemia, delirium, diarrhea and retroperitoneal hematoma. She was eventually discharged to acute rehab, now presenting because of  Underwent debridement of the stump on 5/16, cultures thus far negative    Final discharge diagnoses and hospital course     R AKA c/b wound dehiscence  S/p wound vac placement  S/p washout 5/20/24  No evidence of a bacterial SSTI of the wound at this time. Wound was apparently necrotic but not overtly infected. Surgical cultures are negative thus far and patient doing well, continues to remain off antibiotics. Wound vac is in place,   Staph capitis from surgical culture is growing. This is a coag negative staph which has low pathogenicity, often a contaminant and unlikely to have caused wound dehiscence.  if no other organisms grow, prior hospitalist states would still opt for no antibiotics at this point.   Repeat washout on 5/20.  Resumed plavix and crestor.  Continue heparin subcu for DVT prophylaxis.  Further  management as per the vascular surgery team.  Wound VAC changed today by [facility] nurse  Patient was evaluated by the vascular surgery on day of discharge, wound is healing well with good granulation tissue.  Vascular surgery has cleared her for discharge at this time.     Acute renal failure: Improved  Occurred during recent hospital course likely 2/2 rhabdomyolysis and ischemic injury. Has been on HD Tuesday Thursday, Saturday. However, has been having increased urination .  See nephrology note 5/20/2024 making urine.  Per nephrology holding dialysis since 5/21/2024.  -Creatinine is now stable and improving, continue to improve at this time.  Creatinine on day of discharge improved to 2.85  Creatinine is plateaued and trending down at this time.  No need for further dialysis as per nephrology.  Her tunneled dialysis catheter is now removed  She is on Bumex 2 mg daily we will continue with that.  Continues to make urine.  On low potassium diet.    She needs to follow-up with the nephrology as outpatient.     Anemia of chronic kidney disease  Patient has history of acute blood loss anemia, with retroperitoneal hematoma and surgeries.     No active bleeding at this time  Hemoglobin is stable and improved to 9.2.     Hypertension  Hyperlipidemia  History of coronary artery disease  No active chest pain at this time, blood pressure slightly on the higher side.  She is on PTA amlodipine 10 mg daily, Coreg 12.5 mg twice daily and Bumex, as needed hydralazine     Possible celiac disease  History of loose stools  Patient is noncompliant with gluten-free diet.  Eats, denies pain.  At some point need to follow-up with the GI and counseling.  She does not have a history of loose stools, she does use Lomotil as needed.  Avoid stool softeners at this time     Asthma  GERD  No acute exacerbation of asthma, continue PTA Breo Ellipta   Continue PTA Pepcid 20 mg daily.     History of diabetes mellitus type 2  Last hemoglobin A1c was  5.2  Her Jardiance was discontinued.   Placed on hypoglycemia protocol.  Currently on diet control,      History of B-cell lymphoma  Followed by [place] [facility]  Continue outpatient surveillance and follow-up with oncology.     Hyponatremia  This is likely secondary to renal failure.  Nephrology to follow,    Continue Bumex 2 mg daily and fluid restriction of 1500 mL in 24 hours       Diet: Fluid restriction 1500 ML FLUID  Snacks/Supplements Adult: Margarita Garrison Standard Oral Supplement; With Meals  Combination Diet Gluten Free Diet; 2 gm K Diet        Manuel Robledo MD, MD    Significant Results and Procedures   Date of procedure: May 16, 2024     PREOPERATIVE DIAGNOSIS: Eschar of  distal right AKA stump.     POSTOPERATIVE DIAGNOSIS: Eschar distal right AKA stump.     PROCEDURES PERFORMED:  1.  Full-thickness debridement of skin and subcutaneous tissue distal right above-knee amputation stump (16 x 7 x 0.7 cm).     2.  Application of wound VAC to distal right above-knee amputation stump.     SURGEON: Donovan Enciso MD     ASSISTANT: Dr. Gala Lo MD (third-year vascular surgery resident).     ANESTHESIA: LMA general     EBL: 15 ml    Date of procedure May 20, 2024.  Irrigation and debridement of right lower extremity and wound VAC placement       Preoperative diagnosis: Right above-knee amputation site wound breakdown.     Postoperative diagnosis: Same.     Procedure:  1.  Sharp excisional debridement of right aka stump wound.   2. Application of wound vac.      Surgeon: José Luis Hernandez M.D.   Assistant: Gala Lo M.D.      Anesthesia: General  Estimated blood loss: 20 mls  Specimens: None            Pending Results   These results will be followed up by PCP  Unresulted Labs Ordered in the Past 30 Days of this Admission       Date and Time Order Name Status Description    4/8/2024  3:53 PM Prepare red blood cells (unit) Preliminary     4/2/2024  4:51 AM Prepare red blood cells (unit) Preliminary              Code Status   Full Code       Primary Care Physician   Vasquez Benoit    Physical Exam   Temp: 97.9  F (36.6  C) Temp src: Oral BP: (!) 150/63 Pulse: 63   Resp: 16 SpO2: 92 % O2 Device: None (Room air)    Vitals:    05/27/24 0639 05/28/24 0858 05/29/24 0900   Weight: 56.7 kg (125 lb) 62.1 kg (136 lb 14.5 oz) 62.3 kg (137 lb 5.6 oz)     Vital Signs with Ranges  Temp:  [97.9  F (36.6  C)-98.5  F (36.9  C)] 97.9  F (36.6  C)  Pulse:  [63] 63  Resp:  [15-16] 16  BP: (141-150)/(59-66) 150/63  SpO2:  [92 %-95 %] 92 %  I/O last 3 completed shifts:  In: 660 [P.O.:660]  Out: 500 [Urine:500]    Constitutional: awake, alert, cooperative, no apparent distress, and appears stated age  Eyes: Lids and lashes normal, pupils equal, round and reactive to light, extra ocular muscles intact, sclera clear, conjunctiva normal  Respiratory: No increased work of breathing, good air exchange, clear to auscultation bilaterally, no crackles or wheezing  Cardiovascular: Normal apical impulse, regular rate and rhythm, normal S1 and S2, no S3 or S4, and no murmur noted  GI: No scars, normal bowel sounds, soft, non-distended, non-tender, no masses palpated, no hepatosplenomegally    Discharge Disposition   Discharged to short-term care facility  Condition at discharge: Stable    Consultations This Hospital Stay   VASCULAR SURGERY IP CONSULT  PHYSICAL THERAPY ADULT IP CONSULT  OCCUPATIONAL THERAPY ADULT IP CONSULT  CARE MANAGEMENT / SOCIAL WORK IP CONSULT  NEPHROLOGY IP CONSULT  SMOKING CESSATION PROGRAM IP CONSULT  WOUND OSTOMY CONTINENCE NURSE  IP CONSULT  CARE MANAGEMENT / SOCIAL WORK IP CONSULT  WOUND OSTOMY CONTINENCE NURSE  IP CONSULT  SPIRITUAL HEALTH SERVICES IP CONSULT  INTERVENTIONAL RADIOLOGY ADULT/PEDS IP CONSULT  PHYSICAL THERAPY ADULT IP CONSULT  OCCUPATIONAL THERAPY ADULT IP CONSULT    Time Spent on this Encounter   Manuel IRAHETA MD, personally saw the patient today and spent greater than 30 minutes discharging this  patient.    Discharge Orders      Medication Therapy Management Referral      Primary Care - Care Coordination Referral      General info for SNF    Length of Stay Estimate: Short Term Care: Estimated # of Days <30  Condition at Discharge: Stable  Level of care:skilled   Rehabilitation Potential: Good  Admission H&P remains valid and up-to-date: Yes  Recent Chemotherapy: N/A  Use Nursing Home Standing Orders: Yes     Mantoux instructions    Give two-step Mantoux (PPD) Per Facility Policy Yes     Follow Up and recommended labs and tests    Follow up with shelter physician.  The following labs/tests are recommended: bmp.     Reason for your hospital stay    R AKA c/b wound dehiscence  S/p wound vac placement  S/p washout 5/20/24     Intake and output    Every shift     Daily weights    Call Provider for weight gain of more than 2 pounds per day or 5 pounds per week.     Activity - Up with nursing assistance     Wound care (specify)    As per [facility] recommendations     Follow Up    [facility]  [phone]  2 to 3 week Hospital Follow-up  BMP weekly at TCU     Full Code     Physical Therapy Adult Consult    Evaluate and treat as clinically indicated.    Reason:  weakness     Occupational Therapy Adult Consult    Evaluate and treat as clinically indicated.    Reason:  weakness     Diet    Follow this diet upon discharge: Orders Placed This Encounter      Fluid restriction 1500 ML FLUID      Combination Diet Gluten Free Diet; 2 gm K Diet     Discharge Medications   Current Discharge Medication List        START taking these medications    Details   sevelamer HCl (RENAGEL) 400 MG tablet Take 1 tablet (400 mg) by mouth 3 times daily (with meals)    Associated Diagnoses: Renal failure, unspecified chronicity      wound support modular (EXPEDITE) LIQD bottle Take 60 mLs by mouth daily    Associated Diagnoses: S/P AKA (above knee amputation) unilateral, right (H)           CONTINUE these medications which have  CHANGED    Details   bumetanide (BUMEX) 2 MG tablet Take 2 tablets (4 mg) by mouth daily    Associated Diagnoses: Benign essential hypertension      carvedilol (COREG) 12.5 MG tablet Take 1 tablet (12.5 mg) by mouth 2 times daily (with meals)    Associated Diagnoses: Benign essential hypertension           CONTINUE these medications which have NOT CHANGED    Details   acetaminophen (TYLENOL) 500 MG tablet Take 500-1,000 mg by mouth every 6 hours as needed for mild pain      amLODIPine (NORVASC) 10 MG tablet Take 1 tablet (10 mg) by mouth daily    Associated Diagnoses: Benign essential hypertension      bisacodyl (DULCOLAX) 10 MG suppository Place 1 suppository (10 mg) rectally daily as needed for constipation    Associated Diagnoses: Fecal incontinence alternating with constipation      cetirizine (ZYRTEC) 5 MG tablet Take 1 tablet (5 mg) by mouth daily    Associated Diagnoses: Chronic allergic rhinitis due to animal hair and dander      clopidogrel (PLAVIX) 75 MG tablet Take 1 tablet (75 mg) by mouth daily  Qty: 90 tablet, Refills: 3    Associated Diagnoses: Peripheral vascular disease (H24)      diphenoxylate-atropine (LOMOTIL) 2.5-0.025 MG tablet Take 1 tablet by mouth 4 times daily as needed for diarrhea      famotidine (PEPCID) 20 MG tablet Take 1 tablet (20 mg) by mouth every 48 hours    Associated Diagnoses: Gastroesophageal reflux disease with esophagitis, unspecified whether hemorrhage      fluticasone-vilanterol (BREO ELLIPTA) 200-25 MCG/ACT inhaler Inhale 1 puff into the lungs daily  Qty: 120 each, Refills: 11    Associated Diagnoses: Chronic obstructive pulmonary disease, unspecified COPD type (H)      gabapentin (NEURONTIN) 100 MG capsule Take 2 capsules (200 mg) by mouth three times a week    Associated Diagnoses: S/P AKA (above knee amputation) unilateral, right (H)      hydrALAZINE (APRESOLINE) 10 MG tablet Take 1 tablet (10 mg) by mouth 4 times daily as needed for high blood pressure (SBP > 185.)     Associated Diagnoses: PAD (peripheral artery disease) (H24)      lactobacillus rhamnosus, GG, (CULTURELL) capsule Take 1 capsule by mouth 2 times daily    Associated Diagnoses: Fecal incontinence alternating with constipation      miconazole (MICATIN) 2 % external powder Apply topically 2 times daily    Associated Diagnoses: Fecal incontinence alternating with constipation      multivitamin RENAL (TRIPHROCAPS) 1 capsule capsule Take 1 capsule by mouth daily    Associated Diagnoses: Dialysis patient (H24)      nicotine (NICODERM CQ) 14 MG/24HR 24 hr patch Place 1 patch onto the skin daily  Qty: 30 patch, Refills: 1    Associated Diagnoses: Tobacco use disorder      nitroGLYcerin (NITROSTAT) 0.4 MG sublingual tablet Place 1 tablet (0.4 mg) under the tongue See Admin Instructions for chest pain  Qty: 30 tablet, Refills: 11    Associated Diagnoses: Coronary artery disease involving native coronary artery of native heart without angina pectoris      ondansetron (ZOFRAN ODT) 4 MG ODT tab Take 1 tablet (4 mg) by mouth every 6 hours as needed for nausea or vomiting    Associated Diagnoses: Nausea      polyethylene glycol (MIRALAX) 17 g packet Take 17 g by mouth daily as needed for constipation      rosuvastatin (CRESTOR) 10 MG tablet Take 1 tablet (10 mg) by mouth at bedtime    Associated Diagnoses: Hyperlipidemia LDL goal <70      silver sulfADIAZINE (SILVADENE) 1 % external cream Apply topically daily    Associated Diagnoses: S/P AKA (above knee amputation) unilateral, right (H)           STOP taking these medications       heparin 52575 units/mL injection Comments:   Reason for Stopping:         senna-docusate (SENOKOT-S/PERICOLACE) 8.6-50 MG tablet Comments:   Reason for Stopping:             Allergies   Allergies   Allergen Reactions    Pantoprazole      Protonix caused diffuse edema    Chantix [Varenicline]      Terrible dreams    Contrast Dye Swelling     Patient reports facial and throat swelling with prior CT  contrast.    Penicillins Itching and Rash     Data   Most Recent 3 CBC's:  Recent Labs   Lab Test 05/28/24  0840 05/27/24  0713 05/24/24  1648   WBC 5.1 3.8* 3.4*   HGB 9.2* 8.3* 9.1*   MCV 96 96 96    267 216      Most Recent 3 BMP's:  Recent Labs   Lab Test 05/29/24  0732 05/28/24  0840 05/27/24  0713   * 130* 127*   POTASSIUM 4.8 4.9 5.2   CHLORIDE 96* 95* 95*   CO2 23 22 22   BUN 59.4* 62.5* 61.9*   CR 2.85* 3.00* 3.01*   ANIONGAP 10 13 10   SONIA 8.2* 8.4* 8.0*   GLC 70 92 76     Most Recent 2 LFT's:  Recent Labs   Lab Test 05/16/24  0832 05/14/24  0805   AST 13 16   ALT 9 7   ALKPHOS 74 82   BILITOTAL 0.2 0.3     Most Recent INR's and Anticoagulation Dosing History:  Anticoagulation Dose History  More data exists         Latest Ref Rng & Units 5/10/2016 9/24/2020 5/17/2023 10/5/2023 10/6/2023 10/7/2023 4/11/2024   Recent Dosing and Labs   INR 0.85 - 1.15 0.95  1.01  0.94  0.95  1.08  1.38  1.31      Most Recent 3 Troponin's:  Recent Labs   Lab Test 06/10/20  1243 02/16/20  1824 09/18/19  0739   TROPI <0.015 <0.015 <0.015     Most Recent Cholesterol Panel:  Recent Labs   Lab Test 10/03/23  0941   CHOL 137   LDL 69   HDL 54   TRIG 68     Most Recent 6 Bacteria Isolates From Any Culture (See EPIC Reports for Culture Details):No lab results found.  Most Recent TSH, T4 and A1c Labs:  Recent Labs   Lab Test 04/10/24  0014 01/08/24  1542 02/04/19  0934 03/28/17  0922   TSH 2.53  --    < > 1.37   T4  --   --   --  1.19   A1C  --  5.2   < >  --     < > = values in this interval not displayed.     Results for orders placed or performed during the hospital encounter of 05/15/24   US Lower Extremity Arterial Duplex Right    Narrative    EXAM: US LOWER EXTREMITY ARTERIAL DUPLEX RIGHT  LOCATION: [facility]  DATE: 5/15/2024    INDICATION: History of right above-the-knee amputation, aortobifemoral bypass graft, SFA occlusion and previous right bypass graft removal. Evaluation for profunda  artery origin patency.   COMPARISON: None.  TECHNIQUE: Duplex utilizing 2D gray-scale imaging, Doppler interrogation with color-flow and spectral waveform analysis.    FINDINGS:    RIGHT LOWER EXTREMITY ARTERIAL ASSESSMENT:  Common femoral artery: 56 cm/s, 8.1 mm in diameter  Profunda femoris artery: 564 cm/s. This artery measures 6.6 mm in maximal diameter. However, a focal severe stenosis is seen proximally near its origin measuring approximately 1.5 mm on grayscale images, although visualization is somewhat difficult due   to shadowing from overlying calcified plaques.        Impression    IMPRESSION:    1.  Severe focal atherosclerotic narrowing in the proximal right profunda artery near its origin, with markedly elevated peak systolic flow velocity.   IR CVC Tunnel Removal Right    Narrative    PROCEDURE: Tunneled central venous catheter removal.    HISTORY: Patient no longer requires tunneled central venous catheter.    PROCEDURE/FINDINGS: The catheter and exit site on the chest wall were  prepped and draped in the usual sterile fashion. The catheter was  pulled out completely.    Pressure was held at the venotomy and catheter exit site for 5  minutes. There was no evidence of supraclavicular hematoma, and  bleeding was controlled at the chest site.  The site was cleansed and  dressed. The procedure was well tolerated.      Impression    IMPRESSION: Tunneled central venous catheter removal.    ALBERTO BENITEZ MD         SYSTEM ID:  [id number]     *Note: Due to a large number of results and/or encounters for the requested time period, some results have not been displayed. A complete set of results can be found in Results Review.     Most Recent 3 CBC's:  Recent Labs   Lab Test 05/28/24  0840 05/27/24  0713 05/24/24  1648   WBC 5.1 3.8* 3.4*   HGB 9.2* 8.3* 9.1*   MCV 96 96 96    267 216     Most Recent 3 BMP's:  Recent Labs   Lab Test 05/29/24  0732 05/28/24  0840 05/27/24  0713   * 130* 127*    POTASSIUM 4.8 4.9 5.2   CHLORIDE 96* 95* 95*   CO2 23 22 22   BUN 59.4* 62.5* 61.9*   CR 2.85* 3.00* 3.01*   ANIONGAP 10 13 10   SONIA 8.2* 8.4* 8.0*   GLC 70 92 76     Most Recent 2 LFT's:  Recent Labs   Lab Test 05/16/24  0832 05/14/24  0805   AST 13 16   ALT 9 7   ALKPHOS 74 82   BILITOTAL 0.2 0.3     Most Recent 3 INR's:  Recent Labs   Lab Test 04/11/24  0643 10/07/23  0516 10/06/23  0551   INR 1.31* 1.38* 1.08

## 2024-05-29 NOTE — PROGRESS NOTES
Physical Therapy Discharge Summary    Reason for therapy discharge:    Discharged to transitional care facility.    Progress towards therapy goal(s). See goals on Care Plan in [facility] electronic health record for goal details.  Goals not met.  Barriers to achieving goals:   discharge from facility.    Therapy recommendation(s):    Continued therapy is recommended.  Rationale/Recommendations:  Progress safety and independence with functional mobility.

## 2024-05-29 NOTE — PLAN OF CARE
Trauma/Ortho/Medical (Choose one)  Medical      Diagnosis: Wound Dehiscence   POD#: 9 Washout  Mental Status: A&O  Activity/dangle: Sliding Board   Diet: Gluten Free, 2 GM K, FR 1,500ml  Pain: PRN oxy  Alvarez/Voiding: Purewick   Tele/Restraints/Iso: NA  02/LDA: Dialysis line removed today, No PIV, Wound Vac changed at bedside today  D/C Date: Pending TCU placement   Other Info:

## 2024-05-29 NOTE — PROGRESS NOTES
Care Management Follow Up    Length of Stay (days): 14    Expected Discharge Date: 05/29/2024     Concerns to be Addressed: discharge planning     Patient plan of care discussed at interdisciplinary rounds: Yes    Anticipated Discharge Disposition: Transitional Care     Anticipated Discharge Services:    Anticipated Discharge DME:      Patient/family educated on Medicare website which has current facility and service quality ratings: yes  Education Provided on the Discharge Plan:    Patient/Family in Agreement with the Plan: yes    Referrals Placed by CM/SW: Post Acute Facilities  Private pay costs discussed: Not applicable    Additional Information:  Writer received voicemail from [facility] stating they do not have any available openings therefore declining pt.    Addendum: [facility]. She states she will start prior authorization for insurance and as long as it is approved [facility]. Kesha state she will go update pt. Kesha later informed writer that she updated pt and [facility] with transport later today.    Writer tried calling bedside Rn to get idea as to pt's needed mode of transport. Bedside Rn is busy. Writer spoke with charge who will check on best mode of transport.   Writer messaged doctor Robledo and asked if pt stable for discharge. Doctor states that vascular surgery should be notified of pt discharge to see if they need to add any discharge instructions.  Doctor Robledo states Nephrology to arrange out patient follow up. Writer asked charge RN if she could reach out to vascular and nephrology. Charge states understanding.     Writer will await response regarding best mode of transport.     Writer called pt's sister Yue as charge Rn states Yue was requesting a call. Writer updated Yue of plan for discharge and time. Yue states  understanding. Writer did inform Yue it is pending prior authorization and if for some reason not received yet today then will have to push discharge until tomorrow. Yue states understanding. She states no questions or concerns at this time.     Charge Rn states pt can transport via wheelchair and no oxygen.    Writer set up transport for wheelchair with [facility] transport [facility] for 308pm-353pm.    Writer updated huc, bedside Rn, and charge Rn of transport time. Writer updated [facility] Kesha of transport time. Kesha states understanding and will keep writer updated about auth status.     Writer is informed by doctor and by charge Rn that all orders are done now and writer can fax them. Writer faxed discharge orders to [facility] writer updated Kesha that writer has faxed discharge orders.    Writer completed pas, faxed it and placed in pt's chart.    PAS-RR    D: Per DHS regulation, SW completed and submitted PAS-RR to [place] [employer] Direct [employer] via the Senior LinkAge Line.  PAS-RR confirmation # is : [id number]    P: Further questions may be directed to Senior LinkAge Line at #[phone], option #4 for PAS-RR staff.    Addendum: charge Rn wanting to verify that [facility] knows pt has wound vac. Writer asked care coordinator to touch base with Kesha from [facility]. Care coordinator states she spoke with Kesha and they are aware pt has a wound vac and are still planning to take pt. Care coordinator states that she did push ride back to [phone].     Edie Melgar, BSW  Social Work  [facility]

## 2024-05-30 ENCOUNTER — APPOINTMENT (OUTPATIENT)
Dept: PHYSICAL THERAPY | Facility: SKILLED NURSING FACILITY | Age: 64
DRG: 565 | End: 2024-05-30
Attending: INTERNAL MEDICINE
Payer: COMMERCIAL

## 2024-05-30 ENCOUNTER — PATIENT OUTREACH (OUTPATIENT)
Dept: CARE COORDINATION | Facility: CLINIC | Age: 64
End: 2024-05-30

## 2024-05-30 ENCOUNTER — APPOINTMENT (OUTPATIENT)
Dept: OCCUPATIONAL THERAPY | Facility: SKILLED NURSING FACILITY | Age: 64
DRG: 565 | End: 2024-05-30
Attending: INTERNAL MEDICINE
Payer: COMMERCIAL

## 2024-05-30 LAB
ANION GAP SERPL CALCULATED.3IONS-SCNC: 9 MMOL/L (ref 7–15)
BUN SERPL-MCNC: 62.6 MG/DL (ref 8–23)
CALCIUM SERPL-MCNC: 8.2 MG/DL (ref 8.8–10.2)
CHLORIDE SERPL-SCNC: 97 MMOL/L (ref 98–107)
CREAT SERPL-MCNC: 2.96 MG/DL (ref 0.51–0.95)
DEPRECATED HCO3 PLAS-SCNC: 24 MMOL/L (ref 22–29)
EGFRCR SERPLBLD CKD-EPI 2021: 17 ML/MIN/1.73M2
GLUCOSE SERPL-MCNC: 77 MG/DL (ref 70–99)
POTASSIUM SERPL-SCNC: 5 MMOL/L (ref 3.4–5.3)
SODIUM SERPL-SCNC: 130 MMOL/L (ref 135–145)

## 2024-05-30 PROCEDURE — 36415 COLL VENOUS BLD VENIPUNCTURE: CPT | Performed by: PHYSICIAN ASSISTANT

## 2024-05-30 PROCEDURE — 97530 THERAPEUTIC ACTIVITIES: CPT | Mod: GP

## 2024-05-30 PROCEDURE — 250N000013 HC RX MED GY IP 250 OP 250 PS 637: Performed by: PHYSICIAN ASSISTANT

## 2024-05-30 PROCEDURE — 97535 SELF CARE MNGMENT TRAINING: CPT | Mod: GO

## 2024-05-30 PROCEDURE — 97165 OT EVAL LOW COMPLEX 30 MIN: CPT | Mod: GO

## 2024-05-30 PROCEDURE — 99310 SBSQ NF CARE HIGH MDM 45: CPT | Performed by: INTERNAL MEDICINE

## 2024-05-30 PROCEDURE — 82565 ASSAY OF CREATININE: CPT | Performed by: PHYSICIAN ASSISTANT

## 2024-05-30 PROCEDURE — 120N000009 HC R&B SNF

## 2024-05-30 PROCEDURE — 250N000011 HC RX IP 250 OP 636: Performed by: PHYSICIAN ASSISTANT

## 2024-05-30 PROCEDURE — 99309 SBSQ NF CARE MODERATE MDM 30: CPT | Performed by: PHYSICIAN ASSISTANT

## 2024-05-30 PROCEDURE — 82374 ASSAY BLOOD CARBON DIOXIDE: CPT | Performed by: PHYSICIAN ASSISTANT

## 2024-05-30 PROCEDURE — 97162 PT EVAL MOD COMPLEX 30 MIN: CPT | Mod: GP

## 2024-05-30 PROCEDURE — 250N000009 HC RX 250: Performed by: PHYSICIAN ASSISTANT

## 2024-05-30 RX ORDER — OXYCODONE HYDROCHLORIDE 5 MG/1
5 TABLET ORAL 2 TIMES DAILY PRN
Status: DISCONTINUED | OUTPATIENT
Start: 2024-05-30 | End: 2024-06-08 | Stop reason: HOSPADM

## 2024-05-30 RX ORDER — GABAPENTIN 300 MG/1
300 CAPSULE ORAL AT BEDTIME
Status: DISCONTINUED | OUTPATIENT
Start: 2024-05-31 | End: 2024-05-30

## 2024-05-30 RX ORDER — GABAPENTIN 100 MG/1
200 CAPSULE ORAL AT BEDTIME
Status: DISCONTINUED | OUTPATIENT
Start: 2024-05-31 | End: 2024-06-08 | Stop reason: HOSPADM

## 2024-05-30 RX ADMIN — Medication 1 CAPSULE: at 12:08

## 2024-05-30 RX ADMIN — GABAPENTIN 200 MG: 100 CAPSULE ORAL at 10:02

## 2024-05-30 RX ADMIN — FLUTICASONE FUROATE AND VILANTEROL TRIFENATATE 1 PUFF: 200; 25 POWDER RESPIRATORY (INHALATION) at 08:25

## 2024-05-30 RX ADMIN — MICONAZOLE NITRATE: 20 POWDER TOPICAL at 20:56

## 2024-05-30 RX ADMIN — HEPARIN SODIUM 5000 UNITS: 5000 INJECTION, SOLUTION INTRAVENOUS; SUBCUTANEOUS at 21:03

## 2024-05-30 RX ADMIN — CARVEDILOL 12.5 MG: 12.5 TABLET, FILM COATED ORAL at 08:21

## 2024-05-30 RX ADMIN — HEPARIN SODIUM 5000 UNITS: 5000 INJECTION, SOLUTION INTRAVENOUS; SUBCUTANEOUS at 05:21

## 2024-05-30 RX ADMIN — MICONAZOLE NITRATE: 20 POWDER TOPICAL at 08:32

## 2024-05-30 RX ADMIN — Medication 1 CAPSULE: at 08:18

## 2024-05-30 RX ADMIN — AMLODIPINE BESYLATE 10 MG: 10 TABLET ORAL at 08:18

## 2024-05-30 RX ADMIN — CLOPIDOGREL BISULFATE 75 MG: 75 TABLET ORAL at 08:21

## 2024-05-30 RX ADMIN — HEPARIN SODIUM 5000 UNITS: 5000 INJECTION, SOLUTION INTRAVENOUS; SUBCUTANEOUS at 15:58

## 2024-05-30 RX ADMIN — Medication 1 CAPSULE: at 20:56

## 2024-05-30 RX ADMIN — BUMETANIDE 2 MG: 2 TABLET ORAL at 08:20

## 2024-05-30 RX ADMIN — FAMOTIDINE 20 MG: 20 TABLET ORAL at 08:18

## 2024-05-30 RX ADMIN — ACETAMINOPHEN 1000 MG: 325 TABLET, FILM COATED ORAL at 23:27

## 2024-05-30 RX ADMIN — CARVEDILOL 12.5 MG: 12.5 TABLET, FILM COATED ORAL at 18:32

## 2024-05-30 RX ADMIN — SEVELAMER HYDROCHLORIDE 400 MG: 400 TABLET, FILM COATED ORAL at 08:30

## 2024-05-30 RX ADMIN — TUBERCULIN PURIFIED PROTEIN DERIVATIVE 5 UNITS: 5 INJECTION, SOLUTION INTRADERMAL at 10:04

## 2024-05-30 RX ADMIN — SEVELAMER HYDROCHLORIDE 400 MG: 400 TABLET, FILM COATED ORAL at 18:32

## 2024-05-30 RX ADMIN — ACETAMINOPHEN 1000 MG: 325 TABLET, FILM COATED ORAL at 15:58

## 2024-05-30 RX ADMIN — NICOTINE 1 PATCH: 14 PATCH, EXTENDED RELEASE TRANSDERMAL at 08:21

## 2024-05-30 RX ADMIN — ACETAMINOPHEN 1000 MG: 325 TABLET, FILM COATED ORAL at 08:19

## 2024-05-30 RX ADMIN — CETIRIZINE HYDROCHLORIDE 5 MG: 5 TABLET ORAL at 08:18

## 2024-05-30 RX ADMIN — Medication 60 ML: at 12:06

## 2024-05-30 RX ADMIN — SEVELAMER HYDROCHLORIDE 400 MG: 400 TABLET, FILM COATED ORAL at 12:08

## 2024-05-30 RX ADMIN — ROSUVASTATIN CALCIUM 10 MG: 5 TABLET, FILM COATED ORAL at 21:03

## 2024-05-30 RX ADMIN — OXYCODONE HYDROCHLORIDE 5 MG: 5 TABLET ORAL at 21:03

## 2024-05-30 ASSESSMENT — ACTIVITIES OF DAILY LIVING (ADL)
ADLS_ACUITY_SCORE: 43
BADLS,_PREVIOUS_FUNCTIONAL_LEVEL: INDEPENDENT
ADLS_ACUITY_SCORE: 43
ADLS_ACUITY_SCORE: 41
ADLS_ACUITY_SCORE: 43
ADLS_ACUITY_SCORE: 37
IADLS,_PREVIOUS_FUNCTIONAL_LEVEL: INDEPENDENT
ADLS_ACUITY_SCORE: 41
ADLS_ACUITY_SCORE: 43
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
## 2024-05-30 NOTE — PHARMACY-MEDICATION REGIMEN REVIEW
Pharmacy Medication Regimen Review  Shirley Hendricks is a 63 year old female who is currently in the Transitional Care Unit.    Assessment: All medications have an appropriate indications, durations and no unnecessary use was found    Plan:   Continue current medications as ordered  Attending provider will be sent this note for review.  If there are any emergent issues noted above, pharmacist will contact provider directly by phone.      Pharmacy will periodically review the resident's medication regimen for any PRN medications not administered in > 72 hours and discontinue them. The pharmacist will discuss gradual dose reductions of psychopharmacologic medications with interdisciplinary team on a regular basis.    Please contact pharmacy if the above does not answer specific medication questions/concerns.    Background:  A pharmacist has reviewed all medications and pertinent medical history today.  Medications were reviewed for appropriate use and any irregularities found are listed with recommendations.      Current Facility-Administered Medications:     [START ON 6/1/2024] - Skin Test Reading -, , Does not apply, Q21 Days, Estella Pryor PA-C    acetaminophen (TYLENOL) tablet 500-1,000 mg, 500-1,000 mg, Oral, Q6H PRN, Estella Pryor PA-C, 1,000 mg at 05/30/24 0819    amLODIPine (NORVASC) tablet 10 mg, 10 mg, Oral, Daily, Estella Pryor PA-C, 10 mg at 05/30/24 0818    bumetanide (BUMEX) tablet 2 mg, 2 mg, Oral, Daily, Estella Pryor PA-C, 2 mg at 05/30/24 0820    carvedilol (COREG) tablet 12.5 mg, 12.5 mg, Oral, BID w/meals, Estella Pryor PA-C, 12.5 mg at 05/30/24 0821    cetirizine (zyrTEC) tablet 5 mg, 5 mg, Oral, Daily, Estella Pryor PA-C, 5 mg at 05/30/24 0818    clopidogrel (PLAVIX) tablet 75 mg, 75 mg, Oral, Daily, Estella Pryor PA-C, 75 mg at 05/30/24 0821    diphenoxylate-atropine (LOMOTIL) 2.5-0.025 MG per tablet 1 tablet, 1 tablet, Oral, 4x Daily PRN, Estella Pryor PA-C     famotidine (PEPCID) tablet 20 mg, 20 mg, Oral, Q48H, Estella Pryor PA-C, 20 mg at 05/30/24 0818    fluticasone-vilanterol (BREO ELLIPTA) 200-25 MCG/ACT inhaler 1 puff, 1 puff, Inhalation, Daily, Estella Pryor PA-C, 1 puff at 05/30/24 0825    [START ON 5/31/2024] gabapentin (NEURONTIN) capsule 200 mg, 200 mg, Oral, At Bedtime, Estella Pryor PA-C    heparin ANTICOAGULANT injection 5,000 Units, 5,000 Units, Subcutaneous, Q8H, Estella Pryor PA-C, 5,000 Units at 05/30/24 0521    lactobacillus rhamnosus (GG) (CULTURELL) capsule 1 capsule, 1 capsule, Oral, BID, Estella Pryor PA-C, 1 capsule at 05/30/24 0818    miconazole (MICATIN) 2 % powder, , Topical, BID, Estella Pryor PA-C, Given at 05/30/24 0832    multivitamin RENAL (TRIPHROCAPS) capsule 1 capsule, 1 capsule, Oral, Daily, Estella Pryor PA-C, 1 capsule at 05/30/24 1208    naloxone (NARCAN) injection 0.2 mg, 0.2 mg, Intravenous, Q2 Min PRN **OR** naloxone (NARCAN) injection 0.4 mg, 0.4 mg, Intravenous, Q2 Min PRN **OR** naloxone (NARCAN) injection 0.2 mg, 0.2 mg, Intramuscular, Q2 Min PRN **OR** naloxone (NARCAN) injection 0.4 mg, 0.4 mg, Intramuscular, Q2 Min PRN, Julio Pfeiffer MD    nicotine (NICODERM CQ) 14 MG/24HR 24 hr patch 1 patch, 1 patch, Transdermal, Daily, Estella Pryor PA-C, 1 patch at 05/30/24 0821    Nurse may request from Pharmacy a change of form of medication (e.g. Liquid to tablet)., , Does not apply, Continuous PRN, Estella Pryor PA-C    ondansetron (ZOFRAN ODT) ODT tab 4 mg, 4 mg, Oral, Q6H PRN, Estella Pryor PA-C, 4 mg at 05/29/24 1948    oxyCODONE (ROXICODONE) tablet 5 mg, 5 mg, Oral, BID PRN, Estella Pryor PA-C    polyethylene glycol (MIRALAX) Packet 17 g, 17 g, Oral, Daily PRN, Estella Pryor PA-C    rosuvastatin (CRESTOR) tablet 10 mg, 10 mg, Oral, At Bedtime, Estella Pryor PA-C, 10 mg at 05/29/24 2205    sevelamer HCl (RENAGEL) tablet 400 mg, 400 mg, Oral, TID w/meals, Estella Pryor,  PA-C, 400 mg at 05/30/24 1208    tuberculin injection 5 Units, 5 Units, Intradermal, Q21 Days, Estella Pryor, PA-C, 5 Units at 05/30/24 1004    wound support modular (EXPEDITE) bottle 60 mL, 60 mL, Oral, Daily, Estella Pryor, PA-C, 60 mL at 05/30/24 1206  No current outpatient prescriptions on file.   PMH:   Charcot-Breonna Tooth disease, severe PAD, SLE, DM II, HTN, HLD, CAD, GERD, SVT, possible celiac disease, prior B cell lymphoma (outpatient surveillance), asthma/COPD, and depression/anxiety   Dayna Caballero, AshleyD, BCPS

## 2024-05-30 NOTE — PLAN OF CARE
AOx4. Able to make needs known. Maintained on 1500 Fluid Restriction. Pt. felt nauseous after dinner, zofran tablet given. Complained of leg pain 8/10, patient requested other pain medication, refuses PRN Tylenol. Provider notified. Dilaudid tablet given as per order. Pt. incontinent with bladder, attached purewick during the night. Ax2 w/ sliding board to wheelchair. Wound Vac Right Stump @ 125 mmHG continuous. Nicotine patch intact at Right shoulder. Call light within reach. Continue POC.     - PCD on at left leg.       Patient's most recent vital signs are:     Vital signs:  BP: 152/63  Temp: 97.8  HR: 58  RR: 18  SpO2: 96 %     Patient does not have new respiratory symptoms.  Patient does not have new sore throat.  Patient does not have a fever greater than 99.5.

## 2024-05-30 NOTE — CONSULTS
S: WOC asked to assess and treat RLE wound vac.     B: History per provider: Shirley Hendricks is a 63 year old woman with a history of Charcot-Breonna Tooth disease, severe PAD, SLE, DM II, HTN, HLD, CAD, GERD, SVT, possible celiac disease, prior B cell lymphoma (outpatient surveillance), asthma/COPD, and depression/anxiety who was admitted to [facility] 3/29-4/22/24 with right limb ischemia in setting of severe PAD ultimately requiring right AKA with complicated post op course (including LIMA requiring HD) after which she was discharged to  ARU. She was readmitted to [facility] 5/15-5/29/24 with stump eschars requiring surgical debridement and wound VAC placement. Her dialysis catheter has been removed. Now transferred to  TCU for ongoing rehabilitation and wound cares.     A/R: Per chart review wound vac changes T/F this week due to holiday, then return to [facility]. Updated vac orders under the correct label Deep Open Wound (NPWT).   WOC will see Friday for vac change.    Xenia Pelayo, RN BSN CWOCN

## 2024-05-30 NOTE — PROGRESS NOTES
Clinic Care Coordination Contact  Care Coordination Transition Communication    Clinical Data: Patient was hospitalized at [facility] from 5/15/24 to 5/29/24 with diagnosis of   R AKA c/b wound dehiscence  S/p wound vac placement  S/p washout 5/20/24     Assessment: Patient has transitioned to TCU/ARU for short term rehabilitation:    Facility Name: [facility]   Transition Communication:  Notified facility of Primary Care- Care Coordination support via Epic fax.    Plan: Care Coordinator will await notification from facility staff informing of patient's discharge plans/needs. Care Coordinator will review chart and outreach to facility staff every 4 weeks and as needed.     Merissa Martinez,  [facility]  Clinic Care Coordinator  [facility]  [phone]  [email or web address]
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
## 2024-05-30 NOTE — PROGRESS NOTES
[id number]  ADMISSION:    Shirley Hendricks was admitted from [facility] for Right AKA.  2 RN skin assessment: completed by Still need to do skin assessment   Result of skin assessment and interventions/actions: admitted RN saw bruises on BUE and right AKA. Did not get to see patients bottom.   Height, weight: completed? Yes   Patient belongings & admission documents: see Flowsheets, completed? Yes   MDRO education added to care plan: yes      Pt is A&Ox4, able to make needs known, denied chest pain, SOB, N/T, N/V. Pt arrived from [facility] around 1745 via WC. Pt was oriented to room, vitals are stable, call light within reach, bed in low position. Wound vac set up at -125mmhg continous. Pt has old port site on right chest wall. On Gluten free/2gK diet with a 1500ml/fluid restriction. Pt is incontinent of both, uses bedpan and purewick(purewick at night).     Patient most recent vitals:  BP (!) 152/63   Pulse 58   Temp 97.8  F (36.6  C) (Oral)   Resp 18   Wt 56.8 kg (125 lb 3.5 oz)   LMP  (LMP Unknown)   SpO2 96%   BMI 23.67 kg/m
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
## 2024-05-30 NOTE — PLAN OF CARE
"Patient is a 63 year old female  admitted to room 426 via wheelchair.  Patient is alert and oriented X 4. See Epic for VS and assessment.  Patient is able to transfer from wheelchair to bed using sliding board. Patient was settled into their room, shown call light, tv, mealtimes etc. Oriented to unit. Will continue monitoring pain level and VS. Notifying MD with any concerns.  Follow MD orders for cares and medications.    Flu Vaccine:   - Yes, up to date (patient had vaccine this flu season)      COVID Vaccine:   -  Declined COVID vaccine at this time, education on risks and benefits provided        Pneumococcal Vaccine:   - Yes, up to date       Ethnicity: Not  or   Race: White  Dentures: No  Hearing Aid: No  Smoker:  Yes , 50 yrs ago  Glasses: Yes  Falls 0-1 mo: 0 2-6 mo: 0  Prior device use: Other sliding board    Advanced Care Directive Referral to Social Work?No    6/5/24 Addendum by Areli Hoffman  Flu: current, 11/13/23  COVID: declined, 5/29 - please see above.   Pneumococcal: current, 20 1/8/24. Per CDC, \"Their pneumococcal vaccinations are complete.\"    "
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
## 2024-05-30 NOTE — PROVIDER NOTIFICATION
Patient requesting something stronger for pain other than tylenol     Dilaudid 2 mg po ordered for 4 doses .  Am team to assess

## 2024-05-30 NOTE — H&P
[facility]    History and Physical - Hospitalist Service       Date of Admission:  5/29/2024    Assessment & Plan      Shirley Hendricks is a 63 year old female admitted on 5/29/2024.       Shirley Hendricks is a 63 year old woman with a history of Charcot-Breonna Tooth disease, severe PAD, SLE, DM II, HTN, HLD, CAD, GERD, SVT, possible celiac disease, prior B cell lymphoma (outpatient surveillance), asthma/COPD, and depression/anxiety who was admitted to [facility] 3/29-4/22/24 with right limb ischemia in setting of severe PAD ultimately requiring right AKA with complicated post op course (including LIMA requiring HD) after which she was discharged to  ARU. She was readmitted to [facility] 5/15-5/29/24 with stump eschars requiring surgical debridement and wound VAC placement. Her dialysis catheter has been removed. Now transferred to  TCU for ongoing rehabilitation and wound cares.      #Severe PAD with Acute Thrombosis of CFA-AT Bypass and Acute Limb Ischemia s/p Right AKA 4/2024.   #Stump Wound Dehiscence and Necrosis s/p I&D and Wound VAC Placement 5/16/24.   AKA 4/1/24 with return to OR 4/9/24. Dr. Hernandez at [facility]. Discharged to ARU and then returned to hospital with stump eschars. Underwent I&D and wound VAC placement on 5/16 with VAC exchange in OR on 5/20. Will eventually require split thickness skin graft. Wound was not felt to be infected - surgical culture grew staph capitis which the team felt had low pathogenicity and was unlikely to have cause wound dehiscence. Was not seen by ID. Afebrile, no leukocytosis.   - WOCN consult for wound VAC changes.   - Vascular surgery follow up 6/10.   - Moisturize skin proximal to stump as patient is itching frequently.   - Pain control with PRN Tylenol and is using oxycodone 30 min prior to dressing changes and at bedtime. Previously on gabapentin 100 mg TID which was changed to 200 mg 3x weekly while on HD. Discussed with pharmacy  now that she is off HD - recommend to give nightly, will start with 200 mg dose but could increase to 300 mg in several days if tolerating.   - Continue Plavix and statin.      #LIMA.   #Hyponatremia.   #Hyperphosphatemia.   LIMA occurred during initial hospital stay attributed to rhabdomyolysis and ischemic injury. Followed by nephrology again this hospital stay and dialysis stopped with last run on 5/18 and tunneled line ultimately removed before discharge. UOP increased during hospital stay - measurement here unreliably with incontinence but some documentation with Purewick. Cr 3.0-->2.85-->2.96 over recent days. K 5.0. Na 130 - felt to be hypervolemic. Phos 5.7.   - BMP 3x weekly following renal recovery.   - I&Os and daily weights.   - Low K diet.   - 1500 ml fluid restriction.   - Continue Bumex 2 mg daily.   - Continue sevelamer.   - Nephrology at [facility] was to schedule follow up 2-3 weeks after discharge.      #HTN. BP mostly controlled.   - Continue amlodipine, carvedilol, Bumex.      #Possible Celiac Disease.   #Loose Stools.   Poorly compliant with gluten free diet. Reporting last few stools have been formed.   - Gluten free diet ordered.   - OP GI follow up.   - Has PRN Lomotil for loose stools.  - During visit was knowingly having BM in brief - therapy evals pending, will need toileting reccs/encouragement.      #Asthma, COPD. No acute concerns. Continue inhaler.      #GERD. No acute concerns. Continue Pepcid which is currently renally dosed Q48hrs.      #DM II. HgbA1c 5.2%. Diet controlled.      #Tobacco Use Disorder. Needs smoking cessation. Continue nicotine patch.            Diet: Fluid restriction 1500 ML FLUID  Combination Diet Gluten Free Diet; 2 gm K Diet    DVT Prophylaxis: Heparin SQ  Alvarez Catheter: Not present  Lines: None     Cardiac Monitoring: None  Code Status: Full Code      Clinically Significant Risk Factors Present on Admission         # Hyponatremia: Lowest Na = 129 mmol/L in last  2 days, will monitor as appropriate      # Hypoalbuminemia: Lowest albumin = 2 g/dL at 5/29/2024  7:32 AM, will monitor as appropriate   # Drug Induced Platelet Defect: home medication list includes an antiplatelet medication   # Hypertension: Noted on problem list          # Financial/Environmental Concerns:           Disposition Plan     Medically Ready for Discharge: Anticipated in 5+ Days           Julio Pfeiffer MD  Hospitalist Service  [facility]  Securely message with [employer] (more info)  Text page via [employer] Paging/Directory     ______________________________________________________________________    Chief Complaint   Sp  stump debridement.   Physical deconditioning.     History is obtained from the patient and electronic health record    History of Present Illness     Shirley Hendricks is admitted to rehab after hospitalization for stump eschars requiring surgical debridement and wound VAC placement. During prior hospitalization she had LIMA requiring HD, but last HD run was 5/21 and catheter has now been removed.   See above for the details.      Currently, patient is doing ok. No acute concern.   Denies fever or chills. No cough or cp or sob. No LH or dizziness. No NVD. No dysuria, using purewick catheter. Pain controlled.       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 clot    Post-menopausal     Reflux esophagitis 02/2004    EGD: esophagitis and medium HH    SBO (small bowel obstruction) (H) 08/10/2021    SVT (supraventricular tachycardia) (H24)     Thrombocytopenia (H24)     Uncomplicated asthma     Vitamin C deficiency 08/12/2018       Past Surgical History   Past Surgical History:   Procedure Laterality Date    AMPUTATE LEG ABOVE KNEE N/A 4/1/2024    Procedure: AMPUTATION, ABOVE KNEE right leg with wound vac dressing, excision of anteriotibial bypass graft, closure of (previous interventional radiology) left groin incision;  Surgeon: José Luis Hernandez MD;  Location: [facility] OR    AMPUTATE LEG ABOVE KNEE Right 4/9/2024    Procedure: COMPLETION RIGHT ABOVE KNEE AMPUTATION;  Surgeon: José Luis Hernandez MD;  Location:  OR    ANGIOGRAM      ANGIOGRAM Right 6/23/2021    Procedure: RIGHT LOWER EXTREMITY ANGIOGRAM WITH LEFT BRACHIAL ARTERY CUTDOWN;  Surgeon: José Luis Hernandez MD;  Location: [facility] OR    APPLY WOUND VAC Right 5/16/2024    Procedure: PLACEMENT OF WOUND VAC TO RIGHT ABOVE KNEE AMPUTATION;  Surgeon: Tay Enciso MD;  Location: [facility] OR    APPLY WOUND VAC N/A 5/20/2024    Procedure: AND PLACEMENT OF WOUND VAC;  Surgeon: José Luis Hernandez MD;  Location:  OR    BIOPSY MASS NECK Right 10/11/2023    Procedure: Right Parotid Biopsy;  Surgeon: Randal Mendoza MD;  Location:  OR    BYPASS GRAFT FEMOROPOPLITEAL Right 09/23/2020    Procedure: RIGHT FEMORAL GRAFT TO ABOVE-KNEE POPLITEAL BYPASS USING CADAVERIC SUPERFICIAL FEMORAL ARTERY;  Surgeon: Chadwick Lozano MD;  Location: [facility] OR    BYPASS GRAFT FEMOROPOPLITEAL Right 2/15/2022    Procedure: RIGHT SUPERFICIAL FEMORAL ARTERY GRAFT TO BELOW KNEE POPLITEAL BYPASS WITH CADAVERIC CRYOLIFE  FEMORAL-POPLITEAL ARTERY  SIZE: OUTER DIAMETER: 7MM - 6MM;  Surgeon: Chadwick Lozano;  Location:  OR    BYPASS GRAFT FEMOROPOPLITEAL Right 5/26/2023    Procedure: RIGHT DISTAL SUPERFICIAL FEMORAL GRAFT TO ANTERIOR TIBIAL ARTERY WITH 26 CENTIMETER CADAVERIC SUPERFICIAL FEMORAL ARTERY GRAFT;  Surgeon: Cahdwick Lozano MD;  Location:  OR    BYPASS GRAFT FEMOROPOPLITEAL Right 10/11/2023    Procedure: RIGHT FEMORAL TO POPLITEAL GRAFT THROMBECTOMY;  Surgeon: Chadwick Lozano MD;  Location:  OR    BYPASS GRAFT INSITU FEMOROPOPLITEAL Right 7/7/2021    Procedure: CREATION RIGHT FEMORAL TO POPLITEAL ARTERIAL BYPASS USING INSITU VEIN GRAFT;  Surgeon: José Luis Hernandez MD;  Location:  OR    CARDIAC CATHERIZATION  09/03/2009    multivessel CAD without target lesions, med mgmt indicated, preserved ef    CARPAL TUNNEL RELEASE RT/LT Right 05/20/2021    COLONOSCOPY N/A 12/12/2023    Procedure: Colonoscopy;  Surgeon: Corey Hoffman MD;  Location:  GI    COLONOSCOPY N/A 12/14/2023    Procedure: Colonoscopy;  Surgeon: Corey Hoffman MD;  Location:  GI    CV CORONARY ANGIOGRAM N/A 10/4/2023    Procedure: Coronary Angiogram;  Surgeon: Rogelio Ricks MD;  Location:  [facility] C[facility] CATH LAB    CV PCI N/A 10/4/2023    Procedure: Percutaneous Coronary Intervention;  Surgeon: Rogelio Ricks MD;  Location:  [facility]ARD[facility] CATH LAB    EMBOLECTOMY LOWER EXTREMITY Right 10/6/2023    Procedure: Right anterior tibial bypass with graft, Right tibial endarterectomy,thrombectomy, Right doraslis pedis thrombectomy, Anterior Tibial vein patch.;  Surgeon: Chadwick Lozano MD;  Location:  OR    ENDARTERECTOMY CAROTID Right 05/11/2016    Procedure: ENDARTERECTOMY CAROTID;  Surgeon: Chadwick Lozano MD;  Location:  OR    ENDARTERECTOMY CAROTID Left 06/08/2020    Procedure: LEFT CAROTID ENDARTERECTOMY with distal facal vein patch  and EEG;  Surgeon: Chadwick Lozano MD;  Location:  OR     ESOPHAGOSCOPY, GASTROSCOPY, DUODENOSCOPY (EGD), COMBINED N/A 12/12/2023    Procedure: Esophagoscopy, gastroscopy, duodenoscopy (EGD), combined;  Surgeon: Corey Hoffman MD;  Location:  GI    FINE NEEDLE ASPIRATION WITHOUT IMAGING GUIDANCE Right 9/22/2023    Procedure: Fine needle aspiration without imaging guidance;  Surgeon: Kiersten Aguilera MD;  Location:  GI    FLUORESCENCE INTRAOPERATIVE VASCULAR ANGIOGRAPHY Right 12/28/2022    Procedure: RIGHT LEG ANGIOGRAM with intervention;  Surgeon: hCadwick Lozano MD;  Location:  OR    GYN SURGERY  left tube    left salpingectomy    IR ANGIOGRAM THROUGH CATHETER (ARTERIAL)  10/6/2023    IR ANGIOGRAM THROUGH CATHETER (ARTERIAL)  10/6/2023    IR ANGIOGRAM THROUGH CATHETER (ARTERIAL)  3/31/2024    IR ANGIOGRAM THROUGH CATHETER (ARTERIAL)  3/30/2024    IR CVC TUNNEL PLACEMENT > 5 YRS OF AGE  4/3/2024    IR CVC TUNNEL REMOVAL RIGHT  5/28/2024    IR CVC TUNNEL REVISION RIGHT  4/15/2024    IR LOWER EXTREMITY ANGIOGRAM RIGHT  05/25/2021    IR LOWER EXTREMITY ANGIOGRAM RIGHT  10/5/2023    IR LOWER EXTREMITY ANGIOGRAM RIGHT  3/29/2024    IR OR ANGIOGRAM  6/23/2021    IR OR ANGIOGRAM  12/28/2022    IRRIGATION AND DEBRIDEMENT LOWER EXTREMITY, COMBINED Right 5/16/2024    Procedure: IRRIGATION AND DEBRIDEMENT OF RIGHT ABOVE KNEE AMPUTATION;  Surgeon: Tay Enciso MD;  Location:  OR    IRRIGATION AND DEBRIDEMENT LOWER EXTREMITY, COMBINED Right 5/20/2024    Procedure: IRRIGATION AND DEBRIDMENT RIGHT LOWER EXTREMITY;  Surgeon: José Luis Hernandez MD;  Location:  OR    LAPAROSCOPIC CHOLECYSTECTOMY N/A 09/27/2017    Procedure: LAPAROSCOPIC CHOLECYSTECTOMY;  LAPAROSCOPIC CHOLECYSTECTOMY;  Surgeon: Jacoby Tapia MD;  Location:  [facility]    LAPAROSCOPY DIAGNOSTIC (GENERAL) N/A 8/11/2021    Procedure: Exploratory laparoscopy,  laparoscopic lysis of adhesions, laparotomy;  Surgeon: Corina Ferreira MD;  Location:  OR    OR ANGIOGRAM, LOWER  EXTREMITY Right 10/11/2023    Procedure: Or Angiogram, Lower Extremity;  Surgeon: Chadwick Lozano MD;  Location:  OR    ORTHOPEDIC SURGERY      left knee surgery    REPAIR ANEURYSM FEMORAL ARTERY Left 12/28/2022    Procedure: REPAIR LEFT FEMORAL PSEUDOANEURYSM;  Surgeon: Chadwick Lozano MD;  Location:  OR    VASCULAR SURGERY  aoto bi fem  left fem-AK pop    [facility] FABRIC WRAPPING OF ABDOMINAL ANEURYSM      [facility] NONSPECIFIC PROCEDURE  12/2000    angioplasty with stent right fem. a.    [facility] NONSPECIFIC PROCEDURE  1987    sinus surgery    [facility] NONSPECIFIC PROCEDURE  09/02/2009    Emergent left groin exploration with oversewing of bleeding angiographic site. 2. Endarterectomy of common femoral-proximal superficial femoral artery with greater saphenous vein patch angioplasty.   a. [place] of accessory right greater saphenous vein.     [facility] NONSPECIFIC PROCEDURE  08/27/2009    occluded left common iliac and external iliac arteries were successfully revascularized with stenting to 8 and 7 mm        Prior to Admission Medications   Prior to Admission Medications   Prescriptions Last Dose Informant Patient Reported? Taking?   acetaminophen (TYLENOL) 500 MG tablet  Self Yes No   Sig: Take 500-1,000 mg by mouth every 6 hours as needed for mild pain   amLODIPine (NORVASC) 10 MG tablet   No No   Sig: Take 1 tablet (10 mg) by mouth daily   bisacodyl (DULCOLAX) 10 MG suppository   No No   Sig: Place 1 suppository (10 mg) rectally daily as needed for constipation   bumetanide (BUMEX) 2 MG tablet   No No   Sig: Take 2 tablets (4 mg) by mouth daily   carvedilol (COREG) 12.5 MG tablet   No No   Sig: Take 1 tablet (12.5 mg) by mouth 2 times daily (with meals)   cetirizine (ZYRTEC) 5 MG tablet   No No   Sig: Take 1 tablet (5 mg) by mouth daily   clopidogrel (PLAVIX) 75 MG tablet   No No   Sig: Take 1 tablet (75 mg) by mouth daily   diphenoxylate-atropine (LOMOTIL) 2.5-0.025 MG tablet   Yes No   Sig: Take 1 tablet by mouth 4  times daily as needed for diarrhea   famotidine (PEPCID) 20 MG tablet   No No   Sig: Take 1 tablet (20 mg) by mouth every 48 hours   fluticasone-vilanterol (BREO ELLIPTA) 200-25 MCG/ACT inhaler   No No   Sig: Inhale 1 puff into the lungs daily   gabapentin (NEURONTIN) 100 MG capsule   No No   Sig: Take 2 capsules (200 mg) by mouth three times a week   hydrALAZINE (APRESOLINE) 10 MG tablet   No No   Sig: Take 1 tablet (10 mg) by mouth 4 times daily as needed for high blood pressure (SBP > 185.)   lactobacillus rhamnosus, GG, (CULTURELL) capsule   No No   Sig: Take 1 capsule by mouth 2 times daily   miconazole (MICATIN) 2 % external powder   No No   Sig: Apply topically 2 times daily   multivitamin RENAL (TRIPHROCAPS) 1 capsule capsule   No No   Sig: Take 1 capsule by mouth daily   nicotine (NICODERM CQ) 14 MG/24HR 24 hr patch   No No   Sig: Place 1 patch onto the skin daily   nitroGLYcerin (NITROSTAT) 0.4 MG sublingual tablet  Self No No   Sig: Place 1 tablet (0.4 mg) under the tongue See Admin Instructions for chest pain   ondansetron (ZOFRAN ODT) 4 MG ODT tab   No No   Sig: Take 1 tablet (4 mg) by mouth every 6 hours as needed for nausea or vomiting   polyethylene glycol (MIRALAX) 17 g packet   Yes No   Sig: Take 17 g by mouth daily as needed for constipation   rosuvastatin (CRESTOR) 10 MG tablet   No No   Sig: Take 1 tablet (10 mg) by mouth at bedtime   sevelamer HCl (RENAGEL) 400 MG tablet   No No   Sig: Take 1 tablet (400 mg) by mouth 3 times daily (with meals)   silver sulfADIAZINE (SILVADENE) 1 % external cream   No No   Sig: Apply topically daily   wound support modular (EXPEDITE) LIQD bottle   No No   Sig: Take 60 mLs by mouth daily      Facility-Administered Medications: None        Review of Systems    The 10 point Review of Systems is negative other than noted in the HPI or here.     Social History   I have reviewed this patient's social history and updated it with pertinent information if needed.  Social  History     Tobacco Use    Smoking status: Some Days     Current packs/day: 0.25     Average packs/day: 0.3 packs/day for 56.4 years (14.1 ttl pk-yrs)     Types: Cigarettes     Start date: 1968    Smokeless tobacco: Never    Tobacco comments:     1/2 PPD   Vaping Use    Vaping status: Never Used   Substance Use Topics    Alcohol use: Not Currently     Comment: x1-2 yr    Drug use: Yes     Types: Marijuana     Comment: 2x per day         Family History   I have reviewed this patient's family history and updated it with pertinent information if needed.  Family History   Problem Relation Age of Onset    Cancer Mother         bladder    Cardiovascular Father         alive,multiple heart attacks    Diabetes Maternal Grandmother     Lung Cancer Maternal Grandmother     Blood Disease Brother         clotting disorder         Allergies   Allergies   Allergen Reactions    Pantoprazole      Protonix caused diffuse edema    Chantix [Varenicline]      Terrible dreams    Contrast Dye Swelling     Patient reports facial and throat swelling with prior CT contrast.    Penicillins Itching and Rash        Physical Exam   Vital Signs: Temp: 98.9  F (37.2  C) Temp src: Oral BP: (!) 143/59 Pulse: 66   Resp: 16 SpO2: 90 % O2 Device: None (Room air)    Weight: 125 lbs 3.54 oz    General Appearance: Awake, interactive, NAD  HEENT: AT/NC, Anicteric, Moist MM  Respiratory: Normal work of breathing. RA  Cardiovascular: RRR  GI/Abd: non distended.   Extremities: Right AKA with wound VAC in place.   Neuro: AO x 4. Grossly non focal.   Skin: no acute rash.   Psychiatry: Stable mood.     Medical Decision Making       55 MINUTES SPENT BY ME on the date of service doing chart review, history, exam, documentation & further activities per the note.      Data     I have personally reviewed the following data over the past 24 hrs:    N/A  \   N/A   / N/A     130 (L) 97 (L) 62.6 (H) /  77   5.0 24 2.96 (H) \       Imaging results reviewed over the past  24 hrs:   No results found for this or any previous visit (from the past 24 hour(s)).  Recent Labs   Lab 05/30/24  0606 05/29/24  0732 05/28/24  0840 05/27/24  0713 05/24/24  1816 05/24/24  1648   WBC  --   --  5.1 3.8*  --  3.4*   HGB  --   --  9.2* 8.3*  --  9.1*   MCV  --   --  96 96  --  96   PLT  --   --  303 267  --  216   * 129* 130* 127*   < >  --    POTASSIUM 5.0 4.8 4.9 5.2   < >  --    CHLORIDE 97* 96* 95* 95*   < >  --    CO2 24 23 22 22   < >  --    BUN 62.6* 59.4* 62.5* 61.9*   < >  --    CR 2.96* 2.85* 3.00* 3.01*   < >  --    ANIONGAP 9 10 13 10   < >  --    SONIA 8.2* 8.2* 8.4* 8.0*   < >  --    GLC 77 70 92 76   < >  --    ALBUMIN  --  2.0* 2.1* 1.9*   < >  --     < > = values in this interval not displayed.

## 2024-05-30 NOTE — LETTER
[facility]   To:   [facility] SW          Please give to facility    From:   Merissa Martinez  [facility]  Care Coordinator   [facility]   P: [phone]   [email or web address]   Patient Name:  Shirley Hendricks YOB: 1960   Admit date: 5/29/24      *Information Needed:  Please contact me when the patient will discharge (or if they will move to long term care)- include the discharge date, disposition, and main diagnosis   If the patient is discharged with home care services, please provide the name of the agency    Also- Please inform me if a care conference is being held.   Phone, Fax or Email with information                              Thank you

## 2024-05-30 NOTE — PHARMACY-TCU REVIEW OF H&P
I have reviewed this patient's TCU admission History & Physical for medication related changes/recommendations identified by the admitting provider.  I am confirming that there are no recommendations requiring changes to medication orders are indicated at this time based on the provider recommendations in the H&P.    Dayna Caballero, AshleyD, BCPS

## 2024-05-31 ENCOUNTER — APPOINTMENT (OUTPATIENT)
Dept: PHYSICAL THERAPY | Facility: SKILLED NURSING FACILITY | Age: 64
DRG: 565 | End: 2024-05-31
Attending: INTERNAL MEDICINE
Payer: COMMERCIAL

## 2024-05-31 ENCOUNTER — APPOINTMENT (OUTPATIENT)
Dept: OCCUPATIONAL THERAPY | Facility: SKILLED NURSING FACILITY | Age: 64
DRG: 565 | End: 2024-05-31
Attending: INTERNAL MEDICINE
Payer: COMMERCIAL

## 2024-05-31 PROCEDURE — 250N000013 HC RX MED GY IP 250 OP 250 PS 637: Performed by: PHYSICIAN ASSISTANT

## 2024-05-31 PROCEDURE — 97530 THERAPEUTIC ACTIVITIES: CPT | Mod: GP | Performed by: PHYSICAL THERAPIST

## 2024-05-31 PROCEDURE — 250N000011 HC RX IP 250 OP 636: Performed by: PHYSICIAN ASSISTANT

## 2024-05-31 PROCEDURE — 97110 THERAPEUTIC EXERCISES: CPT | Mod: GP | Performed by: PHYSICAL THERAPIST

## 2024-05-31 PROCEDURE — 120N000009 HC R&B SNF

## 2024-05-31 PROCEDURE — 97535 SELF CARE MNGMENT TRAINING: CPT | Mod: GO

## 2024-05-31 PROCEDURE — G0463 HOSPITAL OUTPT CLINIC VISIT: HCPCS | Mod: 25

## 2024-05-31 PROCEDURE — 97606 NEG PRS WND THER DME>50 SQCM: CPT

## 2024-05-31 RX ADMIN — FLUTICASONE FUROATE AND VILANTEROL TRIFENATATE 1 PUFF: 200; 25 POWDER RESPIRATORY (INHALATION) at 08:15

## 2024-05-31 RX ADMIN — NICOTINE 1 PATCH: 14 PATCH, EXTENDED RELEASE TRANSDERMAL at 08:18

## 2024-05-31 RX ADMIN — SEVELAMER HYDROCHLORIDE 400 MG: 400 TABLET, FILM COATED ORAL at 18:32

## 2024-05-31 RX ADMIN — MICONAZOLE NITRATE: 20 POWDER TOPICAL at 08:17

## 2024-05-31 RX ADMIN — Medication 60 ML: at 08:20

## 2024-05-31 RX ADMIN — HEPARIN SODIUM 5000 UNITS: 5000 INJECTION, SOLUTION INTRAVENOUS; SUBCUTANEOUS at 14:02

## 2024-05-31 RX ADMIN — HEPARIN SODIUM 5000 UNITS: 5000 INJECTION, SOLUTION INTRAVENOUS; SUBCUTANEOUS at 21:09

## 2024-05-31 RX ADMIN — OXYCODONE HYDROCHLORIDE 5 MG: 5 TABLET ORAL at 21:09

## 2024-05-31 RX ADMIN — SEVELAMER HYDROCHLORIDE 400 MG: 400 TABLET, FILM COATED ORAL at 08:18

## 2024-05-31 RX ADMIN — Medication 1 CAPSULE: at 21:09

## 2024-05-31 RX ADMIN — BUMETANIDE 2 MG: 2 TABLET ORAL at 08:16

## 2024-05-31 RX ADMIN — HEPARIN SODIUM 5000 UNITS: 5000 INJECTION, SOLUTION INTRAVENOUS; SUBCUTANEOUS at 05:18

## 2024-05-31 RX ADMIN — SEVELAMER HYDROCHLORIDE 400 MG: 400 TABLET, FILM COATED ORAL at 14:02

## 2024-05-31 RX ADMIN — Medication 1 CAPSULE: at 14:02

## 2024-05-31 RX ADMIN — CARVEDILOL 12.5 MG: 12.5 TABLET, FILM COATED ORAL at 18:32

## 2024-05-31 RX ADMIN — GABAPENTIN 200 MG: 100 CAPSULE ORAL at 21:09

## 2024-05-31 RX ADMIN — CETIRIZINE HYDROCHLORIDE 5 MG: 5 TABLET ORAL at 08:16

## 2024-05-31 RX ADMIN — AMLODIPINE BESYLATE 10 MG: 10 TABLET ORAL at 08:16

## 2024-05-31 RX ADMIN — OXYCODONE HYDROCHLORIDE 5 MG: 5 TABLET ORAL at 08:17

## 2024-05-31 RX ADMIN — ROSUVASTATIN CALCIUM 10 MG: 5 TABLET, FILM COATED ORAL at 21:09

## 2024-05-31 RX ADMIN — CLOPIDOGREL BISULFATE 75 MG: 75 TABLET ORAL at 08:16

## 2024-05-31 RX ADMIN — CARVEDILOL 12.5 MG: 12.5 TABLET, FILM COATED ORAL at 08:16

## 2024-05-31 RX ADMIN — Medication 1 CAPSULE: at 08:16

## 2024-05-31 ASSESSMENT — ACTIVITIES OF DAILY LIVING (ADL)
ADLS_ACUITY_SCORE: 41

## 2024-05-31 NOTE — PROGRESS NOTES
[facility]  WO Nurse Inpatient Assessment     Consulted for: New consult for Right AKA/thigh    Summary: Right thigh wound vac changed 5/24. [facility] has signed off on coccyx. Dressing changes to be performed on Tu/F the week of Memorial Day due to the holiday, then will resume [facility] dressing changes.     Patient History (according to provider note(s):      Shirley Hendricks is a 63 year old woman with a history of Charcot-Breonna Tooth disease, severe PAD, SLE, DM II, HTN, HLD, CAD, GERD, SVT, possible celiac disease, prior B cell lymphoma (outpatient surveillance), asthma/COPD, and depression/anxiety who was admitted to [facility] 3/29-4/22/24 with right limb ischemia in setting of severe PAD ultimately requiring right AKA with complicated post op course (including LIMA requiring HD) after which she was discharged to  ARU. She was readmitted to [facility] 5/15-5/29/24 with stump eschars requiring surgical debridement and wound VAC placement. Her dialysis catheter has been removed. Now transferred to  TCU for ongoing rehabilitation and wound cares.     Assessment:      Areas visualized during today's visit: Focused:R AKA/Thigh    Negative pressure wound therapy applied to: Right AKA and Right Thigh  Right anterior leg    Right posterior leg          5/31 (additional pictures available in media tab)    Wound due to: Surgical Wound   Wound history/plan of care:    Surgical date: 5/20/24   Service following: Vascular  Date Negative Pressure Wound Therapy initiated: 5/20/24   Interventions in place: repositioning, pressure redistribution with device or dressing, specialty surface in use, moisture/incontinence management, offloading, and elevation  Is patient s nutritional status compromised? NO, but reports little appetite  If yes, what interventions are in place? Protein supplements  Reason for initiating vac therapy? Need for accelerated granulation tissue  Which?of?the?following?co-morbidities?apply?  Immobility and PAD  If diabetic is patient on a diabetic management program? N/A   Is osteomyelitis present in wound? no   If yes what treatments are in place? N/A      Wound base: right thigh: Mix of granuar tissue, muscle, slough. , Right medial: 80 % granulation tissue and muscle 20% slough     Palpation of the wound bed: normal       Drainage: small      Volume in cannister: <50ml     Last cannister change date: 5/29/24     Description of drainage: serosanguinous and bloody      Measurements (length x width x depth, in cm) Right Thigh: 14 x 7 x 0.5cm Right medial AKA: 1.7 (positional) x 2.5 x 1cm       Tunneling N/A      Undermining resolved 5/31  Periwound skin: Edematous and Erythema- blanchable       Color: pink       Temperature: normal    Odor: none   Pain: moderate, during tape removal aching, sharp, and right-sided   Pain intervention prior to dressing change: oral oxycodone, soaking, and slow and gentle cares   Treatment goal: Heal , Drainage control, Infection control/prevention, Increase granulation, Maintain (prevention of deterioration), Pain control, Protection, and Promote epidermal migration  STATUS: improving   Supplies ordered: gathered, supplies stored on unit, and discussed with patient     Number of foam pieces removed from a wound (excluding foam for bridge) :  4 GranuFoam Black   Verified this matched the number of foam pieces applied last dressing change: Yes   Number of foam pieces packed into wound (excluding foam for bridge) :  2 GranuFoam Black            Treatment Plan:     Negative pressure wound therapy plan:  Wound location: Right thigh and right AKA   Change Days: Tu/F the week of 5/28 then  Mon/Wed/Fri by WOC RN    Supplies (including all accessories) used: large Black foam , Adaptic/Curity oil emulsion contact layer , and Cavilon no sting barrier film  Cleanse with MicroKlenz prior to replacing NPWT  Suction setting: -125   Methods used: Window paned all periwound skin with vac  "drape prior to applying sponge, Bridged trac pad off bony prominences, Spiral cut foam prior to packing into wound , and Cut foam in half to reduce profile    Staff RN to assess integrity of dressing and ensure suction is set at appropriate level every shift.   Date canister. Chart canister output every shift. Change cannister weekly and PRN if full/occluded     Remove foam dressing and replace with BID normal saline moist gauze dressing if:   -a dressing failure which cannot be repaired within 2 hours   -patient is discharging to home without a home pump   -patient is discharging to a facility outside the local area   -if a dressing is a \"Silver Foam\", remove before Radiation Therapy or MRI     The hospital VAC pump is not to be discharged with the patient.?Ensure to disconnect patient from machine prior to discharge. Then,    - If a home KCI VAC pump has been delivered, connect home cannister to dressing tubing then connect cannister to home pump and turn on machine    - If transferring to a nearby facility with a KCI vac, can disconnect and clamp tubing then cover end with glove so can be reconnected within 2 hours       Coccyx/Sacrum - newly healed area: Apply Sacral Mepilex every other day and prn if soiled or damaged     Pressure Injury Prevention (PIP) Plan:  If patient is declining pressure injury prevention interventions: Explore reason why and address patient's concerns, Educate on pressure injury risk and prevention intervention(s), If patient is still declining, document \"informed refusal\" , and Ensure Care team is aware ( provider, charge nurse, etc)  Mattress: Follow bed algorithm, reassess daily and order specialty mattress, if indicated.  HOB: Maintain at or below 30 degrees, unless contraindicated  Repositioning in bed: Every 1-2 hours , Left/right positioning; avoid supine, and Raise foot of bed prior to raising head of bed, to reduce patient sliding down (shear)  Heels: Keep elevated off mattress " and Pillows under calves  Protective Dressing: Sacral Mepilex for prevention (#024070),  especially for the agitated patient   Positioning Equipment:None  Chair positioning: Repositions self: patient to shift weight every 15 minutes, Assist patient to reposition hourly, and Do NOT use a donut for sitting (this increases pressure to smaller area and creates a higher potential for injury)    If patient has a buttock pressure injury, or high risk for PI use chair cushion or SPS.  Moisture Management: Perineal cleansing /protection: Follow Incontinence Protocol, Avoid brief in bed, Clean and dry skin folds with bathing , and Moisturize dry skin  Under Devices: Inspect skin under all medical devices during skin inspection , Ensure tubes are stabilized without tension, and Ensure patient is not lying on medical devices or equipment when repositioned  Ask provider to discontinue device when no longer needed.     Orders: Reviewed, Written, and Updated    RECOMMEND PRIMARY TEAM ORDER: None, at this time  Education provided: importance of repositioning, plan of care, wound progress, and Off-loading pressure  Discussed plan of care with: Patient and Nurse  WOC nurse follow-up plan: Monday/WednesdayFriday  Notify WOC if wound(s) deteriorate.  Nursing to notify the Provider(s) and re-consult the WOC Nurse if new skin concern.    DATA:     Current support surface: Standard  Standard Isoflex gel  Containment of urine/stool: Incontinent pad in bed  BMI: Body mass index is 23.67 kg/m .   Active diet order: Orders Placed This Encounter      Combination Diet Gluten Free Diet; 2 gm K Diet     Output: I/O last 3 completed shifts:  In: -   Out: 650 [Urine:650]     Labs:   Recent Labs   Lab 05/29/24  0732 05/28/24  0840   ALBUMIN 2.0* 2.1*   HGB  --  9.2*   WBC  --  5.1     Pressure injury risk assessment:   Sensory Perception: 3-->slightly limited  Moisture: 3-->occasionally moist  Activity: 2-->chairfast  Mobility: 2-->very  limited  Nutrition: 2-->probably inadequate  Friction and Shear: 2-->potential problem  Cesar Score: 14    Xenia Pelayo RN BSN CWOCN  Dept. Vocera- Contact [facility] Nurse [facility]  Dept. Office Number: *[phone]

## 2024-05-31 NOTE — PLAN OF CARE
Goal Outcome Evaluation:      Plan of Care Reviewed With: patient    Overall Patient Progress: no change    Pt is alert and oriented x 4, can make needs known, not out of bed on this shift, pure wick on. Wound vac running with no alarms ,  Right AKA dressing CDI. Pain managed by PRN  tylenol. Pt denies SOB, CP and no n/v. No acute issue . Pt comfortable in bed at time time, call light within reach. Continue with plan of care.         Patient's most recent vital signs are:     Vital signs:  BP: 141/62  Temp: 98.7  HR: 62  RR: 17  SpO2: 91 %     Patient does not have new respiratory symptoms.  Patient does not have new sore throat.  Patient does not have a fever greater than 99.5.

## 2024-05-31 NOTE — PROGRESS NOTES
05/31/24 8745   Appointment Info   Signing Clinician's Name / Credentials (PT) Ila Mac, PT, DPT   Rehab Comments (PT) pt requesting later start time.  pt states she is moving slowly this AM   Therapeutic Procedure/Exercise   Ther. Procedure: strength, endurance, ROM, flexibillity Minutes (38285) 20   Symptoms Noted During/After Treatment fatigue   Treatment Detail/Skilled Intervention PT: focus on strengthening, ROM, aps x 15 aarom, heelcord stretch PROM with A, SLR L with min A x 8 reps to fatigue, hip abd/add , cues LLE Siddharth, x 10, LLE bridging , A to stabilize LLE x 8 x 2 sets.  RLE hip abd/add, SLR x 15 reps   Therapeutic Activity   Therapeutic Activities: dynamic activities to improve functional performance Minutes (48193) 25   Treatment Detail/Skilled Intervention see below for transfers, wc mob .   PT Discharge Planning   PT Plan PT: car tx , LE strengthening, progress transfers.   PT Discharge Recommendation (DC Rec) home with assist;home with home care physical therapy   PT Rationale for DC Rec Patient currently needing up to mod A for slide board transfers and lydia cares with goal of being able to pivot without a slide board. Patient's sister plans to edwina with her at DC to assist.   Total Session Time   Timed Code Treatment Minutes 45   Total Session Time (sum of timed and untimed services) 45   PT - Transitional Care Unit Time   Individual Time (minutes) - PT 45   Group Time (minutes) - PT 0   Concurrent Time (minutes) - PT 0   Co-Treatment Time (minutes) - PT 0   TCU Total Session Time (minutes) - PT 45   TCU Daily Total Session Time   PT TCU Daily Total Session Time 45   Rehab TCU Daily Total Session Time 45   Bed Mobility: Turning side to side/Roll Left and Right   Describe Performance to L sba, rail   Bed Mobility: Lying to sitting on the side of bed   Patient Performance Supervision or verbal cues   Describe Performance A with wound vac, slow, rail,   Transfers: Chair/Bed transfers    Equipment Used Sliding board   Describe Performance Pt initially wanting bed lower , but could not start the transfer, so PT lowered bed for her so it was more level, then with Siddharth, slow.  A for armrest on wc.  extra time.   Wheel 50 Feet - Ability to move wheelchair/scooter   Describe Performance Pt used manual wc in guillen, B UEs, slow pace and 75 ft x 2, with seated rest between. Pt needing A for legrest L. Pt needing A to set up wc for transfer earlier in session.   Wheel 150 Feet - Ability to move wheelchair/scooter   Mobility Device Used Manual   Describe Performance Pt used manual wc in guillen, B UEs, slow pace and 75 ft x 2, with seated rest between.  Pt needing A for legrest L.  Pt needing A to set up wc for transfer earlier in session.

## 2024-05-31 NOTE — PLAN OF CARE
Goal Outcome Evaluation:      Plan of Care Reviewed With: patient    Overall Patient Progress: no changeOverall Patient Progress: no change      Patient is alert and oriented x4. Able to make needs known. Pt denied SOB, N/V and chest pain. Pt is on gluten free with 2 grams K diet and fluid restriction. Wound vac is functioning properly with right AKA dressing that is clean dry and intact. Sacral dressing was changed with notable redness. Pt received PRN Tylenol given for lower back/buttocks pain. Pt is assist of 2 with sliding board. Continent of bowel and bladder. Call light within reach.      Patient's most recent vital signs are:     Vital signs:  BP: 141/62  Temp: 98.7  HR: 62  RR: 17  SpO2: 91 %     Patient does not have new respiratory symptoms.  Patient does not have new sore throat.  Patient does not have a fever greater than 99.5.      Problem: Fall Injury Risk  Goal: Absence of Fall and Fall-Related Injury  Outcome: Progressing     Problem: Skin Injury Risk Increased  Goal: Skin Health and Integrity  Outcome: Progressing     Problem: Pain Chronic (Persistent)  Goal: Optimal Pain Control and Function  Outcome: Progressing

## 2024-06-01 ENCOUNTER — APPOINTMENT (OUTPATIENT)
Dept: OCCUPATIONAL THERAPY | Facility: SKILLED NURSING FACILITY | Age: 64
DRG: 565 | End: 2024-06-01
Attending: INTERNAL MEDICINE
Payer: COMMERCIAL

## 2024-06-01 LAB
ANION GAP SERPL CALCULATED.3IONS-SCNC: 9 MMOL/L (ref 7–15)
BUN SERPL-MCNC: 62 MG/DL (ref 8–23)
CALCIUM SERPL-MCNC: 7.9 MG/DL (ref 8.8–10.2)
CHLORIDE SERPL-SCNC: 99 MMOL/L (ref 98–107)
CREAT SERPL-MCNC: 2.45 MG/DL (ref 0.51–0.95)
DEPRECATED HCO3 PLAS-SCNC: 24 MMOL/L (ref 22–29)
EGFRCR SERPLBLD CKD-EPI 2021: 21 ML/MIN/1.73M2
GLUCOSE SERPL-MCNC: 73 MG/DL (ref 70–99)
PLATELET # BLD AUTO: 293 10E3/UL (ref 150–450)
POTASSIUM SERPL-SCNC: 4.9 MMOL/L (ref 3.4–5.3)
SODIUM SERPL-SCNC: 132 MMOL/L (ref 135–145)

## 2024-06-01 PROCEDURE — 250N000011 HC RX IP 250 OP 636: Performed by: PHYSICIAN ASSISTANT

## 2024-06-01 PROCEDURE — 85049 AUTOMATED PLATELET COUNT: CPT | Performed by: PHYSICIAN ASSISTANT

## 2024-06-01 PROCEDURE — 97535 SELF CARE MNGMENT TRAINING: CPT | Mod: GO

## 2024-06-01 PROCEDURE — 120N000009 HC R&B SNF

## 2024-06-01 PROCEDURE — 250N000013 HC RX MED GY IP 250 OP 250 PS 637: Performed by: PHYSICIAN ASSISTANT

## 2024-06-01 PROCEDURE — 36415 COLL VENOUS BLD VENIPUNCTURE: CPT | Performed by: PHYSICIAN ASSISTANT

## 2024-06-01 PROCEDURE — 80048 BASIC METABOLIC PNL TOTAL CA: CPT | Performed by: PHYSICIAN ASSISTANT

## 2024-06-01 RX ADMIN — HEPARIN SODIUM 5000 UNITS: 5000 INJECTION, SOLUTION INTRAVENOUS; SUBCUTANEOUS at 14:22

## 2024-06-01 RX ADMIN — CARVEDILOL 12.5 MG: 12.5 TABLET, FILM COATED ORAL at 09:12

## 2024-06-01 RX ADMIN — SEVELAMER HYDROCHLORIDE 400 MG: 400 TABLET, FILM COATED ORAL at 18:28

## 2024-06-01 RX ADMIN — Medication 1 CAPSULE: at 12:24

## 2024-06-01 RX ADMIN — GABAPENTIN 200 MG: 100 CAPSULE ORAL at 21:25

## 2024-06-01 RX ADMIN — CETIRIZINE HYDROCHLORIDE 5 MG: 5 TABLET ORAL at 09:12

## 2024-06-01 RX ADMIN — Medication 1 CAPSULE: at 09:12

## 2024-06-01 RX ADMIN — BUMETANIDE 2 MG: 2 TABLET ORAL at 09:12

## 2024-06-01 RX ADMIN — Medication 60 ML: at 09:22

## 2024-06-01 RX ADMIN — ROSUVASTATIN CALCIUM 10 MG: 5 TABLET, FILM COATED ORAL at 21:25

## 2024-06-01 RX ADMIN — NICOTINE 1 PATCH: 14 PATCH, EXTENDED RELEASE TRANSDERMAL at 09:13

## 2024-06-01 RX ADMIN — SEVELAMER HYDROCHLORIDE 400 MG: 400 TABLET, FILM COATED ORAL at 12:24

## 2024-06-01 RX ADMIN — Medication 1 CAPSULE: at 21:24

## 2024-06-01 RX ADMIN — SEVELAMER HYDROCHLORIDE 400 MG: 400 TABLET, FILM COATED ORAL at 09:21

## 2024-06-01 RX ADMIN — HEPARIN SODIUM 5000 UNITS: 5000 INJECTION, SOLUTION INTRAVENOUS; SUBCUTANEOUS at 06:45

## 2024-06-01 RX ADMIN — CLOPIDOGREL BISULFATE 75 MG: 75 TABLET ORAL at 09:13

## 2024-06-01 RX ADMIN — CARVEDILOL 12.5 MG: 12.5 TABLET, FILM COATED ORAL at 18:28

## 2024-06-01 RX ADMIN — ACETAMINOPHEN 1000 MG: 325 TABLET, FILM COATED ORAL at 09:22

## 2024-06-01 RX ADMIN — FAMOTIDINE 20 MG: 20 TABLET ORAL at 09:13

## 2024-06-01 RX ADMIN — OXYCODONE HYDROCHLORIDE 5 MG: 5 TABLET ORAL at 21:25

## 2024-06-01 RX ADMIN — AMLODIPINE BESYLATE 10 MG: 10 TABLET ORAL at 09:12

## 2024-06-01 RX ADMIN — FLUTICASONE FUROATE AND VILANTEROL TRIFENATATE 1 PUFF: 200; 25 POWDER RESPIRATORY (INHALATION) at 09:19

## 2024-06-01 RX ADMIN — MICONAZOLE NITRATE: 20 POWDER TOPICAL at 09:22

## 2024-06-01 RX ADMIN — HEPARIN SODIUM 5000 UNITS: 5000 INJECTION, SOLUTION INTRAVENOUS; SUBCUTANEOUS at 21:25

## 2024-06-01 ASSESSMENT — ACTIVITIES OF DAILY LIVING (ADL)
ADLS_ACUITY_SCORE: 42
ADLS_ACUITY_SCORE: 41
ADLS_ACUITY_SCORE: 41
ADLS_ACUITY_SCORE: 42
ADLS_ACUITY_SCORE: 41
ADLS_ACUITY_SCORE: 42
ADLS_ACUITY_SCORE: 41
ADLS_ACUITY_SCORE: 42
ADLS_ACUITY_SCORE: 42
ADLS_ACUITY_SCORE: 41
ADLS_ACUITY_SCORE: 42
ADLS_ACUITY_SCORE: 41
ADLS_ACUITY_SCORE: 41
ADLS_ACUITY_SCORE: 42

## 2024-06-01 NOTE — PLAN OF CARE
Goal Outcome Evaluation:         Pt alert and oriented X4, able to make needs known. No c/o chest pain, SOB, N/V. No PRN requested this shift. Wound vac on R stump running @ -125mmHg. Nicotine patch placed on L shoulder. Incontinent of bowel this shift. Transfers with A1-2 using sliding board. Call light at reach. Will continue with POC.          Patient's most recent vital signs are:     Vital signs:  BP: 169/65  Temp: 98.1  HR: 63  RR: 18  SpO2: 90 %     Patient does not have new respiratory symptoms.  Patient does not have new sore throat.  Patient does not have a fever greater than 99.5.

## 2024-06-01 NOTE — PLAN OF CARE
Goal Outcome Evaluation:         Alert and oriented X4 .Able to make needs known.On 1500 FR ,complained of leg pain 7/10,PRN Oxy administered at bedtime.Pt. incontinent bowel uses,purewick at night. A X2 w/ sliding board to wheelchair. Wound Vac Right stump @ 125 mmHG continuous. Nicotine patch intact at Right shoulder. Call light within reach. Will Cont POC.       Patient's most recent vital signs are:     Vital signs:  BP: 141/66  Temp: 97.5  HR: 73  RR: 18  SpO2: 96 %     Patient does not have new respiratory symptoms.  Patient does not have new sore throat.  Patient does not have a fever greater than 99.5.

## 2024-06-02 ENCOUNTER — APPOINTMENT (OUTPATIENT)
Dept: PHYSICAL THERAPY | Facility: SKILLED NURSING FACILITY | Age: 64
DRG: 565 | End: 2024-06-02
Attending: INTERNAL MEDICINE
Payer: COMMERCIAL

## 2024-06-02 ENCOUNTER — HEALTH MAINTENANCE LETTER (OUTPATIENT)
Age: 64
End: 2024-06-02

## 2024-06-02 PROCEDURE — 250N000011 HC RX IP 250 OP 636: Performed by: PHYSICIAN ASSISTANT

## 2024-06-02 PROCEDURE — 97530 THERAPEUTIC ACTIVITIES: CPT | Mod: GP

## 2024-06-02 PROCEDURE — 120N000009 HC R&B SNF

## 2024-06-02 PROCEDURE — 250N000013 HC RX MED GY IP 250 OP 250 PS 637: Performed by: PHYSICIAN ASSISTANT

## 2024-06-02 PROCEDURE — 99310 SBSQ NF CARE HIGH MDM 45: CPT | Performed by: PHYSICIAN ASSISTANT

## 2024-06-02 RX ADMIN — Medication 1 CAPSULE: at 08:50

## 2024-06-02 RX ADMIN — BUMETANIDE 2 MG: 2 TABLET ORAL at 08:50

## 2024-06-02 RX ADMIN — CARVEDILOL 12.5 MG: 12.5 TABLET, FILM COATED ORAL at 18:42

## 2024-06-02 RX ADMIN — ROSUVASTATIN CALCIUM 10 MG: 5 TABLET, FILM COATED ORAL at 21:17

## 2024-06-02 RX ADMIN — SEVELAMER HYDROCHLORIDE 400 MG: 400 TABLET, FILM COATED ORAL at 08:50

## 2024-06-02 RX ADMIN — OXYCODONE HYDROCHLORIDE 5 MG: 5 TABLET ORAL at 21:20

## 2024-06-02 RX ADMIN — GABAPENTIN 200 MG: 100 CAPSULE ORAL at 21:17

## 2024-06-02 RX ADMIN — SEVELAMER HYDROCHLORIDE 400 MG: 400 TABLET, FILM COATED ORAL at 11:54

## 2024-06-02 RX ADMIN — MICONAZOLE NITRATE: 20 POWDER TOPICAL at 21:17

## 2024-06-02 RX ADMIN — FLUTICASONE FUROATE AND VILANTEROL TRIFENATATE 1 PUFF: 200; 25 POWDER RESPIRATORY (INHALATION) at 08:59

## 2024-06-02 RX ADMIN — AMLODIPINE BESYLATE 10 MG: 10 TABLET ORAL at 08:50

## 2024-06-02 RX ADMIN — CETIRIZINE HYDROCHLORIDE 5 MG: 5 TABLET ORAL at 08:50

## 2024-06-02 RX ADMIN — MICONAZOLE NITRATE: 20 POWDER TOPICAL at 08:58

## 2024-06-02 RX ADMIN — CARVEDILOL 12.5 MG: 12.5 TABLET, FILM COATED ORAL at 08:50

## 2024-06-02 RX ADMIN — HEPARIN SODIUM 5000 UNITS: 5000 INJECTION, SOLUTION INTRAVENOUS; SUBCUTANEOUS at 21:17

## 2024-06-02 RX ADMIN — Medication 60 ML: at 08:55

## 2024-06-02 RX ADMIN — SEVELAMER HYDROCHLORIDE 400 MG: 400 TABLET, FILM COATED ORAL at 18:42

## 2024-06-02 RX ADMIN — NICOTINE 1 PATCH: 14 PATCH, EXTENDED RELEASE TRANSDERMAL at 08:56

## 2024-06-02 RX ADMIN — CLOPIDOGREL BISULFATE 75 MG: 75 TABLET ORAL at 08:49

## 2024-06-02 RX ADMIN — Medication 1 CAPSULE: at 21:17

## 2024-06-02 RX ADMIN — Medication 1 CAPSULE: at 11:54

## 2024-06-02 RX ADMIN — HEPARIN SODIUM 5000 UNITS: 5000 INJECTION, SOLUTION INTRAVENOUS; SUBCUTANEOUS at 14:12

## 2024-06-02 RX ADMIN — HEPARIN SODIUM 5000 UNITS: 5000 INJECTION, SOLUTION INTRAVENOUS; SUBCUTANEOUS at 05:55

## 2024-06-02 ASSESSMENT — ACTIVITIES OF DAILY LIVING (ADL)
ADLS_ACUITY_SCORE: 44
ADLS_ACUITY_SCORE: 42
ADLS_ACUITY_SCORE: 44
ADLS_ACUITY_SCORE: 42
ADLS_ACUITY_SCORE: 44
ADLS_ACUITY_SCORE: 42
ADLS_ACUITY_SCORE: 44
ADLS_ACUITY_SCORE: 42
ADLS_ACUITY_SCORE: 44
ADLS_ACUITY_SCORE: 44
ADLS_ACUITY_SCORE: 42
ADLS_ACUITY_SCORE: 44
ADLS_ACUITY_SCORE: 42
ADLS_ACUITY_SCORE: 44
ADLS_ACUITY_SCORE: 42
ADLS_ACUITY_SCORE: 44

## 2024-06-02 NOTE — PLAN OF CARE
Goal Outcome Evaluation:         MDS Pain Assessment    The following is the pain interview as conducted by the TCU RN caring for the patient on June 2, 2024. This assessment is required by the [facility] for all patients in [place] SNF (Skilled Nursing Facilities).     . Pain Presence  Have you had pain or hurting at any time in the last 5 days?   1. Yes    . Pain Frequency  How much of the time have you experienced pain or hurting over the last 5 days?   2. Occasionally    . Pain Effect on Sleep  Over the past 5 days, how much of the time has pain made it hard for you to sleep at night?   2. Occasionally    . Pain Interference with Therapy Activities  Over the past 5 days, how often have you limited your participation in rehabilitation therapy sessions due to pain?   2. Occasionally    . Pain Interference with Day-to-Day Activities  Over the past 5 days, have you limited your day-to-day activities (excluding rehabilitation therapy sessions) because of pain?  2. Occasionally    . Pain intensity   Verbal Descriptor Scale: Please rate the intensity of your worst pain over the last 5 days. 2. Moderate

## 2024-06-02 NOTE — PROGRESS NOTES
[facility]  Transitional Care Unit Progress Note  Shirley Hendricks  [id number]  June 2, 2024         Assessment & Plan:   Shirley Hendricks is a 63 year old woman with a history of Charcot-Breonna Tooth disease, severe PAD, SLE, DM II, HTN, HLD, CAD, GERD, SVT, possible celiac disease, prior B cell lymphoma (outpatient surveillance), asthma/COPD, and depression/anxiety who was admitted to [facility] 3/29-4/22/24 with right limb ischemia in setting of severe PAD ultimately requiring right AKA with complicated post op course (including LIMA requiring HD) after which she was discharged to  ARU. She was readmitted to [facility] 5/15-5/29/24 with stump eschars requiring surgical debridement and wound VAC placement. Her dialysis catheter has been removed. Now transferred to  TCU for ongoing rehabilitation and wound cares.      #Severe PAD with Acute Thrombosis of CFA-AT Bypass and Acute Limb Ischemia s/p Right AKA 4/2024.   #Stump Wound Dehiscence and Necrosis s/p I&D and Wound VAC Placement 5/16/24.   AKA 4/1/24 with return to OR 4/9/24. Dr. Hernandez at [facility]. Discharged to ARU and then returned to hospital with stump eschars. Underwent I&D and wound VAC placement on 5/16 with VAC exchange in OR on 5/20. Will eventually require split thickness skin graft. Wound was not felt to be infected - surgical culture grew staph capitis which the team felt had low pathogenicity and was unlikely to have cause wound dehiscence. Was not seen by ID. Remains afebrile, no leukocytosis.   - WOCN consult for wound VAC changes.   - Vascular surgery follow up 6/10.   - Moisturize skin proximal to stump as patient is itching frequently.   - Pain control with PRN Tylenol and is using oxycodone (5 mg) 30 min prior to dressing changes and at bedtime.  - Continue gabapentin 200 mg at bedtime   - Continue Plavix and statin.      #LIMA, improving   #Hyponatremia.   #Hyperphosphatemia.   LIMA occurred during initial  hospital stay attributed to rhabdomyolysis and ischemic injury. Followed by nephrology and dialysis stopped with last run 5/18 and tunneled line ultimately removed before discharge. UOP increased during hospital stay - measurement here unreliable with incontinence but some documentation with [place]. Cr 3.0-->2.96-->2.45 over recent days. Na  - felt to be hypervolemic. Most recent Phos 5.7.   - BMP 3x weekly following renal recovery; phosphorus once weekly  - I&Os and daily weights.   - Low K diet.   - 1500 ml fluid restriction.   - Continue Bumex 2 mg daily.   - Continue sevelamer.   - Nephrology at [facility] was to schedule follow up 2-3 weeks after discharge (would have [facility] call tomorrow as I'm not seeing this scheduled as of yet). .      #HTN. BPs stable.  - Continue amlodipine, carvedilol, Bumex.      #Possible Celiac Disease.   #Loose Stools.   Poorly compliant with gluten free diet. Incontinent of stool.  - Gluten free diet ordered.   - OP GI follow up.   - Has PRN Lomotil for loose stools.  - Will need toileting reccs/encouragement prior to discharge     #Asthma, COPD. No acute concerns. Continue inhaler.      #GERD. No acute concerns. Continue Pepcid which is currently renally dosed Q48hrs.      #DM II. HgbA1c 5.2%. Diet controlled.      #Tobacco Use Disorder. Needs smoking cessation. Continue nicotine patch.      Diet and/or tube feedings: Regular   DVT prophylaxis: Heparin SQ,   Indications for psychotropic medications: N/A  Code status: Full Code  Pneumococcal vaccination status: Up to date    Yoshi Dillard PA-C  Internal Medicine Hospitalist   [facility]  [phone]          Consults:   PT/OT         Discharge Planning:   To home 6/8 after therapies scheduled to end 6/7.        Interval History:   No acute events overnight. Pain stable. Denies acute physical concerns at this time.          Physical Exam:   Vitals were reviewed  Blood pressure (!) 161/61, pulse 65, temperature 98.3   F (36.8  C), temperature source Oral, resp. rate 18, weight 56.7 kg (125 lb), SpO2 93%, not currently breastfeeding.  GEN: In NAD  HEENT: NCAT; PERRL; sclerae non-icteric  LUNGS: CTAB  CV: RRR  ABD: +BSs; SNTND  EXT: R AKA with intact wound VAC   SKIN: No acute rashes noted on exposed areas.  NEURO: AAOx3; CNs grossly intact; No acute focal deficits noted.        ROUTINE LABS:  CMP  Recent Labs   Lab 06/01/24  0627 05/30/24  0606 05/29/24  0732 05/28/24  0840 05/27/24  0713   * 130* 129* 130* 127*   POTASSIUM 4.9 5.0 4.8 4.9 5.2   CHLORIDE 99 97* 96* 95* 95*   CO2 24 24 23 22 22   ANIONGAP 9 9 10 13 10   GLC 73 77 70 92 76   BUN 62.0* 62.6* 59.4* 62.5* 61.9*   CR 2.45* 2.96* 2.85* 3.00* 3.01*   GFRESTIMATED 21* 17* 18* 17* 17*   SONIA 7.9* 8.2* 8.2* 8.4* 8.0*   PHOS  --   --  5.7* 5.6* 6.0*   ALBUMIN  --   --  2.0* 2.1* 1.9*     CBC  Recent Labs   Lab 06/01/24 0627 05/28/24  0840 05/27/24  0713   WBC  --  5.1 3.8*   RBC  --  2.99* 2.75*   HGB  --  9.2* 8.3*   HCT  --  28.6* 26.4*   MCV  --  96 96   MCH  --  30.8 30.2   MCHC  --  32.2 31.4*   RDW  --  15.5* 15.5*    303 267     Medical Decision Making       45 MINUTES SPENT BY ME on the date of service doing chart review, history, exam, documentation & further activities per the note.        Yoshi Dillard PA-C  Internal Medicine Hospitalist   [facility]  [phone]

## 2024-06-02 NOTE — PLAN OF CARE
Goal Outcome Evaluation:         Alert and oriented X4 .Able to make needs known.On 1500 FR ,complained of leg pain 6/10,PRN Oxy administered at bedtime.Pt. incontinent bowel uses,purewick at night. A X2 w/ sliding board to wheelchair. Wound Vac Right stump @ 125 mmHG continuous. Nicotine patch intact at Right shoulder. Call light within reach. Will Cont POC           Patient's most recent vital signs are:     Vital signs:  BP: 164/61  Temp: 98.2  HR: 59  RR: 18  SpO2: 90 %     Patient does not have new respiratory symptoms.  Patient does not have new sore throat.  Patient does not have a fever greater than 99.5.

## 2024-06-03 ENCOUNTER — TELEPHONE (OUTPATIENT)
Dept: NEPHROLOGY | Facility: CLINIC | Age: 64
End: 2024-06-03
Payer: COMMERCIAL

## 2024-06-03 ENCOUNTER — APPOINTMENT (OUTPATIENT)
Dept: OCCUPATIONAL THERAPY | Facility: SKILLED NURSING FACILITY | Age: 64
DRG: 565 | End: 2024-06-03
Attending: INTERNAL MEDICINE
Payer: COMMERCIAL

## 2024-06-03 ENCOUNTER — APPOINTMENT (OUTPATIENT)
Dept: PHYSICAL THERAPY | Facility: SKILLED NURSING FACILITY | Age: 64
DRG: 565 | End: 2024-06-03
Attending: INTERNAL MEDICINE
Payer: COMMERCIAL

## 2024-06-03 PROCEDURE — 97110 THERAPEUTIC EXERCISES: CPT | Mod: GP | Performed by: REHABILITATION PRACTITIONER

## 2024-06-03 PROCEDURE — 250N000011 HC RX IP 250 OP 636: Performed by: PHYSICIAN ASSISTANT

## 2024-06-03 PROCEDURE — 97606 NEG PRS WND THER DME>50 SQCM: CPT

## 2024-06-03 PROCEDURE — 120N000009 HC R&B SNF

## 2024-06-03 PROCEDURE — 250N000013 HC RX MED GY IP 250 OP 250 PS 637: Performed by: PHYSICIAN ASSISTANT

## 2024-06-03 PROCEDURE — 99310 SBSQ NF CARE HIGH MDM 45: CPT | Performed by: PHYSICIAN ASSISTANT

## 2024-06-03 PROCEDURE — 97530 THERAPEUTIC ACTIVITIES: CPT | Mod: GP | Performed by: REHABILITATION PRACTITIONER

## 2024-06-03 PROCEDURE — 97535 SELF CARE MNGMENT TRAINING: CPT | Mod: GO

## 2024-06-03 RX ADMIN — SEVELAMER HYDROCHLORIDE 400 MG: 400 TABLET, FILM COATED ORAL at 18:16

## 2024-06-03 RX ADMIN — CLOPIDOGREL BISULFATE 75 MG: 75 TABLET ORAL at 08:07

## 2024-06-03 RX ADMIN — CETIRIZINE HYDROCHLORIDE 5 MG: 5 TABLET ORAL at 08:07

## 2024-06-03 RX ADMIN — Medication 1 CAPSULE: at 08:07

## 2024-06-03 RX ADMIN — CARVEDILOL 12.5 MG: 12.5 TABLET, FILM COATED ORAL at 18:16

## 2024-06-03 RX ADMIN — SEVELAMER HYDROCHLORIDE 400 MG: 400 TABLET, FILM COATED ORAL at 12:22

## 2024-06-03 RX ADMIN — BUMETANIDE 2 MG: 2 TABLET ORAL at 08:07

## 2024-06-03 RX ADMIN — FLUTICASONE FUROATE AND VILANTEROL TRIFENATATE 1 PUFF: 200; 25 POWDER RESPIRATORY (INHALATION) at 08:11

## 2024-06-03 RX ADMIN — Medication 60 ML: at 08:13

## 2024-06-03 RX ADMIN — ROSUVASTATIN CALCIUM 10 MG: 5 TABLET, FILM COATED ORAL at 21:12

## 2024-06-03 RX ADMIN — OXYCODONE HYDROCHLORIDE 5 MG: 5 TABLET ORAL at 21:14

## 2024-06-03 RX ADMIN — Medication 1 CAPSULE: at 12:22

## 2024-06-03 RX ADMIN — NICOTINE 1 PATCH: 14 PATCH, EXTENDED RELEASE TRANSDERMAL at 08:11

## 2024-06-03 RX ADMIN — HEPARIN SODIUM 5000 UNITS: 5000 INJECTION, SOLUTION INTRAVENOUS; SUBCUTANEOUS at 14:37

## 2024-06-03 RX ADMIN — ACETAMINOPHEN 650 MG: 325 TABLET, FILM COATED ORAL at 10:44

## 2024-06-03 RX ADMIN — HEPARIN SODIUM 5000 UNITS: 5000 INJECTION, SOLUTION INTRAVENOUS; SUBCUTANEOUS at 06:12

## 2024-06-03 RX ADMIN — SEVELAMER HYDROCHLORIDE 400 MG: 400 TABLET, FILM COATED ORAL at 08:07

## 2024-06-03 RX ADMIN — FAMOTIDINE 20 MG: 20 TABLET ORAL at 08:07

## 2024-06-03 RX ADMIN — AMLODIPINE BESYLATE 10 MG: 10 TABLET ORAL at 08:07

## 2024-06-03 RX ADMIN — CARVEDILOL 12.5 MG: 12.5 TABLET, FILM COATED ORAL at 08:07

## 2024-06-03 RX ADMIN — HEPARIN SODIUM 5000 UNITS: 5000 INJECTION, SOLUTION INTRAVENOUS; SUBCUTANEOUS at 21:13

## 2024-06-03 RX ADMIN — Medication 1 CAPSULE: at 21:12

## 2024-06-03 RX ADMIN — GABAPENTIN 200 MG: 100 CAPSULE ORAL at 21:12

## 2024-06-03 RX ADMIN — OXYCODONE HYDROCHLORIDE 5 MG: 5 TABLET ORAL at 10:44

## 2024-06-03 ASSESSMENT — ACTIVITIES OF DAILY LIVING (ADL)
ADLS_ACUITY_SCORE: 44
ADLS_ACUITY_SCORE: 52
ADLS_ACUITY_SCORE: 44
ADLS_ACUITY_SCORE: 44
ADLS_ACUITY_SCORE: 46
ADLS_ACUITY_SCORE: 44
ADLS_ACUITY_SCORE: 46
ADLS_ACUITY_SCORE: 46
ADLS_ACUITY_SCORE: 48
ADLS_ACUITY_SCORE: 44
ADLS_ACUITY_SCORE: 46
ADLS_ACUITY_SCORE: 44
ADLS_ACUITY_SCORE: 46
ADLS_ACUITY_SCORE: 46
ADLS_ACUITY_SCORE: 44
ADLS_ACUITY_SCORE: 46
ADLS_ACUITY_SCORE: 44
ADLS_ACUITY_SCORE: 44
ADLS_ACUITY_SCORE: 46

## 2024-06-03 NOTE — PROGRESS NOTES
[facility] Services   Internal Medicine Progress Note    Rehab Day # 5    Assessment & Plan:   Shirley Hendricks is a 63 year old female with history of Charcot-Breonna Tooth disease, severe PAD, SLE, DM II, HTN, HLD, CAD, GERD, SVT, possible celiac disease, prior B cell lymphoma (outpatient surveillance), asthma/COPD, and depression/anxiety who was admitted to [facility] 3/29-4/22/24 with right limb ischemia in setting of severe PAD ultimately requiring right AKA with complicated post op course (including LIMA requiring HD) after which she was discharged to  ARU. She was readmitted to [facility] 5/15-5/29/24 with stump eschars requiring surgical debridement and wound VAC placement. Her dialysis catheter has been removed. Transferred to  TCU for ongoing rehabilitation and wound cares.      Severe PAD with acute thrombosis of CFA-AT bypass and acute limb ischemia s/p R AKA 4/2024. Stump wound dehiscence and necrosis s/p I&D and wound VAC placement 5/16/24: AKA 4/1/24 with return to OR 4/9/24 at [facility]. Discharged to ARU and then returned to hospital with stump eschars. S/p I&D and wound VAC placement 5/16 with VAC exchange in OR 5/20. Will eventually require split thickness skin graft. Wound was not felt to be infected - surgical culture grew staph capitis which the team felt had low pathogenicity and was unlikely to have cause wound dehiscence. Was not seen by ID. Remains afebrile, no leukocytosis.   - WOCN following for wound VAC management   - Vascular surgery follow up 6/10  - Moisturize skin proximal to stump as patient is itching frequently.   - Pain control with PRN Tylenol and is using oxycodone (5 mg) 30 min prior to dressing changes and at bedtime. Continue nightly gabapentin  - Continue Plavix and statin    ILMA, improving: Initially occurred at [facility] 2/2 rhabdomyolysis and ischemic injury. Required HD (last run 5/18, line since removed). I&Os quite challenging given Purewick use,  but Cr improving, 2.45 6/1  - BMP Tu/Th/Sa  - I&Os, daily weights  - Low K diet  - Continue Bumex  - Needs OP nephrology follow up through  SD 2-3 weeks after discharge. Per chart review, next available is 1/2025 but team is working to get that pushed up    Hyponatremia: Mild. Na 132 on 6/1 check and slowly improving. Per notes, etiology thought 2/2 renal failure. BMP as above      Hyperphosphatemia: In the setting of the above. Weekly phos check. Continue Sevelamer      Possible celiac disease, loose stools: Poorly compliant with gluten free diet. Incontinent of stool.  - Gluten free diet ordered  - OP GI follow up needed  - Has PRN Lomotil for loose stools  - Will need toileting reccs/encouragement prior to discharge     Other Medical Issues:  HTN: BP stable. Continue amlodipine, carvedilol, Bumex with hold parameters  Asthma, COPD: No acute concerns. Continue inhalers  GERD:Continue renally dosed Pepcid   DM II: A1c 5.2. Diet controlled   Tobacco use disorder: Needs smoking cessation. Continue nicotine patch  HLD: Continue statin      The above was discuss with patient and attending physician who agrees with the above    CODE: Full Code  DVT: Heparin  Diet: Low K diet  Indications for Psychotropic Medications: N/A  Disposition: Home 6/8    Opal Zurita PA-C  Hospitalist Service  Pager: [phone]  ________________________________________________________________    Subjective & Interval History:    Patient frustrated by limited dietary choices but making due. She says her wound is healing well and she is quite pleased with it. Denies any bleeding or issues with VAC change today. No fever or chills. No AMS. Getting stronger. Looking forward to discharge    Last 24 hour care team notes reviewed.   ROS: 4 point ROS (including Respiratory, CV, GI and ) was performed and negative unless otherwise noted in HPI.     Medications: Reviewed in EPIC.    Physical Exam:    General Appearance: Alert and oriented x3,  pleasant   HEENT: Anicteric sclera, MMM   Respiratory: Breathing comfortably on room air, lungs CTAB without wheezing or crackles   Cardiovascular: RRR, S1, S2. No murmur noted  GI: Abdomen soft, non-tender with active bowel sounds. No guarding or rebound  Lymph/Hematologic: R AKA with wound VAC in pace, CDI. Moves other extremities. No peripheral edema noted  Skin: No rash or jaundice   Musculoskeletal: Moves all extremities   Neurologic: No focal deficits, CN II-XII grossly intact      Lines/Tubes/Drains:

## 2024-06-03 NOTE — PLAN OF CARE
Goal Outcome Evaluation:      Plan of Care Reviewed With: patient    Overall Patient Progress: no changeOverall Patient Progress: no change      Patient is alert and oriented x4. Able to make needs known. Pt denied SOB, N/V and chest pain. Pt is on gluten free with 2 grams K diet and fluid restriction. Wound vac is functioning properly with right AKA dressing that was changed today by [facility] nurse. Sacral dressing was changed with notable redness. Pt received PRN Oxycodone and Tylenol for lower back/buttocks pain. Pt is assist of 2 with sliding board. Continent of bowel and bladder. Call light within reach.      Patient's most recent vital signs are:     Vital signs:  BP: 160/71  Temp: 98.1  HR: 64  RR: 18  SpO2: 94 %     Patient does not have new respiratory symptoms.  Patient does not have new sore throat.  Patient does not have a fever greater than 99.5.        Problem: Pain Chronic (Persistent)  Goal: Optimal Pain Control and Function  Outcome: Progressing     Problem: Gas Exchange Impaired  Goal: Optimal Gas Exchange  Outcome: Progressing     Problem: Skin Injury Risk Increased  Goal: Skin Health and Integrity  Outcome: Progressing  Intervention: Plan: Nurse Driven Intervention: Moisture Management  Recent Flowsheet Documentation  Taken 6/3/2024 0800 by Juan Carlos Sue, RN  Moisture Interventions: Encourage regular toileting  Bathing/Skin Care: (Pt refused powder, but weight shift before breakfast) patient refused     Problem: Fall Injury Risk  Goal: Absence of Fall and Fall-Related Injury  Outcome: Progressing

## 2024-06-03 NOTE — PROGRESS NOTES
[facility]  WO Nurse Inpatient Assessment     Consulted for: New consult for Right AKA/thigh    Summary: Right thigh wound vac changed 5/24. [facility] has signed off on coccyx. Dressing changes to be performed on Tu/F the week of Memorial Day due to the holiday, then will resume [facility] dressing changes.     Patient History (according to provider note(s):      Shirley Hendricks is a 63 year old woman with a history of Charcot-Breonna Tooth disease, severe PAD, SLE, DM II, HTN, HLD, CAD, GERD, SVT, possible celiac disease, prior B cell lymphoma (outpatient surveillance), asthma/COPD, and depression/anxiety who was admitted to [facility] 3/29-4/22/24 with right limb ischemia in setting of severe PAD ultimately requiring right AKA with complicated post op course (including LIMA requiring HD) after which she was discharged to  ARU. She was readmitted to [facility] 5/15-5/29/24 with stump eschars requiring surgical debridement and wound VAC placement. Her dialysis catheter has been removed. Now transferred to  TCU for ongoing rehabilitation and wound cares.     Assessment:      Areas visualized during today's visit: Focused:R AKA/Thigh    Negative pressure wound therapy applied to: Right AKA and Right Thigh  Right anterior leg    Right posterior leg          5/31 (additional pictures available in media tab)    Wound due to: Surgical Wound   Wound history/plan of care:    Surgical date: 5/20/24   Service following: Vascular  Date Negative Pressure Wound Therapy initiated: 5/20/24   Interventions in place: repositioning, pressure redistribution with device or dressing, specialty surface in use, moisture/incontinence management, offloading, and elevation  Is patient s nutritional status compromised? NO, but reports little appetite  If yes, what interventions are in place? Protein supplements  Reason for initiating vac therapy? Need for accelerated granulation tissue  Which?of?the?following?co-morbidities?apply?  Immobility and PAD  If diabetic is patient on a diabetic management program? N/A   Is osteomyelitis present in wound? no   If yes what treatments are in place? N/A      Wound base: right thigh: Mix of granuar tissue, muscle, slough. , Right medial: 80 % granulation tissue and muscle 20% slough     Palpation of the wound bed: normal       Drainage: small      Volume in cannister: <50ml     Last cannister change date: 5/29/24     Description of drainage: serosanguinous and bloody      Measurements (length x width x depth, in cm) Right Thigh: 14 x 7 x 0.5cm Right medial AKA: 1.7 (positional) x 2.5 x 1cm       Tunneling N/A      Undermining resolved 5/31  Periwound skin: Edematous and Erythema- blanchable       Color: pink       Temperature: normal    Odor: none   Pain: moderate, during tape removal aching, sharp, and right-sided   Pain intervention prior to dressing change: oral oxycodone, soaking, and slow and gentle cares   Treatment goal: Heal , Drainage control, Infection control/prevention, Increase granulation, Maintain (prevention of deterioration), Pain control, Protection, and Promote epidermal migration  STATUS: improving   Supplies ordered: gathered, supplies stored on unit, and discussed with patient     Number of foam pieces removed from a wound (excluding foam for bridge) :  2 GranuFoam Black   Verified this matched the number of foam pieces applied last dressing change: Yes   Number of foam pieces packed into wound (excluding foam for bridge) :  2 GranuFoam Black            Treatment Plan:     Negative pressure wound therapy plan:  Wound location: Right thigh and right AKA   Change Days: Tu/F the week of 5/28 then  Mon/Wed/Fri by WOC RN    Supplies (including all accessories) used: large Black foam , Adaptic/Curity oil emulsion contact layer , and Cavilon no sting barrier film  Cleanse with MicroKlenz prior to replacing NPWT  Suction setting: -125   Methods used: Window paned all periwound skin with vac  "drape prior to applying sponge, Bridged trac pad off bony prominences, Spiral cut foam prior to packing into wound , and Cut foam in half to reduce profile    Staff RN to assess integrity of dressing and ensure suction is set at appropriate level every shift.   Date canister. Chart canister output every shift. Change cannister weekly and PRN if full/occluded     Remove foam dressing and replace with BID normal saline moist gauze dressing if:   -a dressing failure which cannot be repaired within 2 hours   -patient is discharging to home without a home pump   -patient is discharging to a facility outside the local area   -if a dressing is a \"Silver Foam\", remove before Radiation Therapy or MRI     The hospital VAC pump is not to be discharged with the patient.?Ensure to disconnect patient from machine prior to discharge. Then,    - If a home KCI VAC pump has been delivered, connect home cannister to dressing tubing then connect cannister to home pump and turn on machine    - If transferring to a nearby facility with a KCI vac, can disconnect and clamp tubing then cover end with glove so can be reconnected within 2 hours       Coccyx/Sacrum - newly healed area: Apply Sacral Mepilex every other day and prn if soiled or damaged     Pressure Injury Prevention (PIP) Plan:  If patient is declining pressure injury prevention interventions: Explore reason why and address patient's concerns, Educate on pressure injury risk and prevention intervention(s), If patient is still declining, document \"informed refusal\" , and Ensure Care team is aware ( provider, charge nurse, etc)  Mattress: Follow bed algorithm, reassess daily and order specialty mattress, if indicated.  HOB: Maintain at or below 30 degrees, unless contraindicated  Repositioning in bed: Every 1-2 hours , Left/right positioning; avoid supine, and Raise foot of bed prior to raising head of bed, to reduce patient sliding down (shear)  Heels: Keep elevated off mattress " and Pillows under calves  Protective Dressing: Sacral Mepilex for prevention (#[id number]),  especially for the agitated patient   Positioning Equipment:None  Chair positioning: Repositions self: patient to shift weight every 15 minutes, Assist patient to reposition hourly, and Do NOT use a donut for sitting (this increases pressure to smaller area and creates a higher potential for injury)    If patient has a buttock pressure injury, or high risk for PI use chair cushion or SPS.  Moisture Management: Perineal cleansing /protection: Follow Incontinence Protocol, Avoid brief in bed, Clean and dry skin folds with bathing , and Moisturize dry skin  Under Devices: Inspect skin under all medical devices during skin inspection , Ensure tubes are stabilized without tension, and Ensure patient is not lying on medical devices or equipment when repositioned  Ask provider to discontinue device when no longer needed.     Orders: Reviewed, Written, and Updated    RECOMMEND PRIMARY TEAM ORDER: None, at this time  Education provided: importance of repositioning, plan of care, wound progress, and Off-loading pressure  Discussed plan of care with: Patient and Nurse  WOC nurse follow-up plan: Monday/WednesdayFriday  Notify WOC if wound(s) deteriorate.  Nursing to notify the Provider(s) and re-consult the WOC Nurse if new skin concern.    DATA:     Current support surface: Standard  Standard Isoflex gel  Containment of urine/stool: Incontinent pad in bed  BMI: Body mass index is 23.63 kg/m .   Active diet order: Orders Placed This Encounter      Combination Diet Gluten Free Diet; 2 gm K Diet     Output: I/O last 3 completed shifts:  In: -   Out: 800 [Urine:800]     Labs:   Recent Labs   Lab 05/29/24  0732 05/28/24  0840   ALBUMIN 2.0* 2.1*   HGB  --  9.2*   WBC  --  5.1     Pressure injury risk assessment:   Sensory Perception: 3-->slightly limited  Moisture: 3-->occasionally moist  Activity: 2-->chairfast  Mobility: 2-->very  limited  Nutrition: 3-->adequate  Friction and Shear: 2-->potential problem  Cesar Score: 15    Xenia Pelayo RN BSN CWOCN  Dept. Vocera- Contact [facility]  Dept. Office Number: *[phone]

## 2024-06-03 NOTE — PLAN OF CARE
Goal Outcome Evaluation:         Pt alert and oriented X4, able to make needs known. No c/o chest pain, SOB, N/V.  Incontinent of B&B, last BM today. Transfers using sliding board. Takes pills whole with thin liquids. Wound vac on R stump dressing intact, running @ -125mmHg. No alarms. PRN  oxycodone given at bedtime. Call light at reach. Will continue with POC.          Patient's most recent vital signs are:     Vital signs:  BP: 162/67  Temp: 98.2  HR: 64  RR: 18  SpO2: 94 %     Patient does not have new respiratory symptoms.  Patient does not have new sore throat.  Patient does not have a fever greater than 99.5.

## 2024-06-03 NOTE — PLAN OF CARE
[facility] follow up appointment with nephrology but they cannot able to see pt until January- they sent message to care team awaiting for them to call the unit.

## 2024-06-03 NOTE — TELEPHONE ENCOUNTER
Health Call Center    Phone Message    May a detailed message be left on voicemail: no     Reason for Call: Appointment Intake    Referring Provider Name: [facility]  Diagnosis and/or Symptoms: LIMA with 2-3 week priority referral    Chelsie from [facility] called to schedule this. Writer wasn't able to pull up anything until January. Please call pt to let her know if you can see her sooner or not, so she can possibly go to a preferred partner. Pt is currently hospitalized at [facility].     Action Taken: Message routed to:  [facility]    Travel Screening: Not Applicable     Date of Service:

## 2024-06-03 NOTE — DISCHARGE INSTRUCTIONS
Psych  You are scheduled to see Dr. Palacio on 6/13/2024 at 12pm.    Address [address]  [place] [address]      Phone   [phone]    Nephrology  You are scheduled to see Dr. Mckee  on 6/20/2024 at 1pm.    Address [address]   [address]   [place] [address]    Phone   [phone]     Primary Care Provider  You are scheduled to see Dr. Benoit on 6/21/2024 at 9:00am.    Address [address] [address]     Phone   [phone]    Home Health  [facility]  PH- [phone] Fax-[phone]  -Nurse, physical therapy, and occupational therapy,    You will get a call after you have discharged from Transitional care unit to schedule the first home care visit. The home health nurse should come out within the first 24-48 hours. If you don't get a call from them within the first 48 hours, please call to follow up (number above).

## 2024-06-03 NOTE — PLAN OF CARE
Goal Outcome Evaluation:      Plan of Care Reviewed With: patient    Overall Patient Progress: no changeOverall Patient Progress: no change     Pt on fluid restriction of 1500 ML. Alert and oriented x 4. Able to make needs known. Appears to be incontinent of both bowel and bladder. Pure wick in use during this shift. Wound Vac with continuous suctioning @ -125 dressing intact clean and dry. No alarms noted this shift. All safety measure in place. Will continue with POC

## 2024-06-04 ENCOUNTER — APPOINTMENT (OUTPATIENT)
Dept: OCCUPATIONAL THERAPY | Facility: SKILLED NURSING FACILITY | Age: 64
DRG: 565 | End: 2024-06-04
Attending: INTERNAL MEDICINE
Payer: COMMERCIAL

## 2024-06-04 ENCOUNTER — APPOINTMENT (OUTPATIENT)
Dept: PHYSICAL THERAPY | Facility: SKILLED NURSING FACILITY | Age: 64
DRG: 565 | End: 2024-06-04
Attending: INTERNAL MEDICINE
Payer: COMMERCIAL

## 2024-06-04 LAB
ERYTHROCYTE [DISTWIDTH] IN BLOOD BY AUTOMATED COUNT: 15.4 % (ref 10–15)
HCT VFR BLD AUTO: 26.3 % (ref 35–47)
HGB BLD-MCNC: 8.4 G/DL (ref 11.7–15.7)
MCH RBC QN AUTO: 31.1 PG (ref 26.5–33)
MCHC RBC AUTO-ENTMCNC: 31.9 G/DL (ref 31.5–36.5)
MCV RBC AUTO: 97 FL (ref 78–100)
PLATELET # BLD AUTO: 250 10E3/UL (ref 150–450)
RBC # BLD AUTO: 2.7 10E6/UL (ref 3.8–5.2)
WBC # BLD AUTO: 5.9 10E3/UL (ref 4–11)

## 2024-06-04 PROCEDURE — 120N000009 HC R&B SNF

## 2024-06-04 PROCEDURE — 97542 WHEELCHAIR MNGMENT TRAINING: CPT | Mod: GP

## 2024-06-04 PROCEDURE — 36415 COLL VENOUS BLD VENIPUNCTURE: CPT | Performed by: PHYSICIAN ASSISTANT

## 2024-06-04 PROCEDURE — 250N000013 HC RX MED GY IP 250 OP 250 PS 637: Performed by: PHYSICIAN ASSISTANT

## 2024-06-04 PROCEDURE — 85014 HEMATOCRIT: CPT | Performed by: PHYSICIAN ASSISTANT

## 2024-06-04 PROCEDURE — 250N000011 HC RX IP 250 OP 636: Performed by: PHYSICIAN ASSISTANT

## 2024-06-04 PROCEDURE — 022N000001 HC SNF RUG CODE OPNP

## 2024-06-04 PROCEDURE — 97530 THERAPEUTIC ACTIVITIES: CPT | Mod: GP

## 2024-06-04 PROCEDURE — 97535 SELF CARE MNGMENT TRAINING: CPT | Mod: GO

## 2024-06-04 PROCEDURE — 36416 COLLJ CAPILLARY BLOOD SPEC: CPT | Performed by: PHYSICIAN ASSISTANT

## 2024-06-04 RX ADMIN — Medication 1 CAPSULE: at 09:24

## 2024-06-04 RX ADMIN — FLUTICASONE FUROATE AND VILANTEROL TRIFENATATE 1 PUFF: 200; 25 POWDER RESPIRATORY (INHALATION) at 09:29

## 2024-06-04 RX ADMIN — HEPARIN SODIUM 5000 UNITS: 5000 INJECTION, SOLUTION INTRAVENOUS; SUBCUTANEOUS at 14:22

## 2024-06-04 RX ADMIN — HEPARIN SODIUM 5000 UNITS: 5000 INJECTION, SOLUTION INTRAVENOUS; SUBCUTANEOUS at 06:16

## 2024-06-04 RX ADMIN — CARVEDILOL 12.5 MG: 12.5 TABLET, FILM COATED ORAL at 09:24

## 2024-06-04 RX ADMIN — SEVELAMER HYDROCHLORIDE 400 MG: 400 TABLET, FILM COATED ORAL at 17:13

## 2024-06-04 RX ADMIN — GABAPENTIN 200 MG: 100 CAPSULE ORAL at 21:01

## 2024-06-04 RX ADMIN — CARVEDILOL 12.5 MG: 12.5 TABLET, FILM COATED ORAL at 17:13

## 2024-06-04 RX ADMIN — ROSUVASTATIN CALCIUM 10 MG: 5 TABLET, FILM COATED ORAL at 21:01

## 2024-06-04 RX ADMIN — OXYCODONE HYDROCHLORIDE 5 MG: 5 TABLET ORAL at 20:45

## 2024-06-04 RX ADMIN — NICOTINE 1 PATCH: 14 PATCH, EXTENDED RELEASE TRANSDERMAL at 09:31

## 2024-06-04 RX ADMIN — CLOPIDOGREL BISULFATE 75 MG: 75 TABLET ORAL at 09:24

## 2024-06-04 RX ADMIN — HEPARIN SODIUM 5000 UNITS: 5000 INJECTION, SOLUTION INTRAVENOUS; SUBCUTANEOUS at 21:01

## 2024-06-04 RX ADMIN — Medication 1 CAPSULE: at 12:13

## 2024-06-04 RX ADMIN — BUMETANIDE 2 MG: 2 TABLET ORAL at 09:24

## 2024-06-04 RX ADMIN — SEVELAMER HYDROCHLORIDE 400 MG: 400 TABLET, FILM COATED ORAL at 12:13

## 2024-06-04 RX ADMIN — AMLODIPINE BESYLATE 10 MG: 10 TABLET ORAL at 09:24

## 2024-06-04 RX ADMIN — SEVELAMER HYDROCHLORIDE 400 MG: 400 TABLET, FILM COATED ORAL at 09:24

## 2024-06-04 RX ADMIN — Medication 60 ML: at 09:29

## 2024-06-04 RX ADMIN — ACETAMINOPHEN 1000 MG: 325 TABLET, FILM COATED ORAL at 09:38

## 2024-06-04 RX ADMIN — CETIRIZINE HYDROCHLORIDE 5 MG: 5 TABLET ORAL at 09:24

## 2024-06-04 RX ADMIN — Medication 1 CAPSULE: at 20:46

## 2024-06-04 ASSESSMENT — ACTIVITIES OF DAILY LIVING (ADL)
ADLS_ACUITY_SCORE: 52
ADLS_ACUITY_SCORE: 54
ADLS_ACUITY_SCORE: 52
ADLS_ACUITY_SCORE: 54
ADLS_ACUITY_SCORE: 54
ADLS_ACUITY_SCORE: 52
ADLS_ACUITY_SCORE: 54
ADLS_ACUITY_SCORE: 52
ADLS_ACUITY_SCORE: 54
ADLS_ACUITY_SCORE: 54
ADLS_ACUITY_SCORE: 52
ADLS_ACUITY_SCORE: 54
ADLS_ACUITY_SCORE: 54
ADLS_ACUITY_SCORE: 52

## 2024-06-04 NOTE — PROGRESS NOTES
06/04/24 1300   Name of Certified Therapeutic Rec Specialist   Name of Certified Therapeutic Rec Specialist BROWN Pearson   Appointment Type   Type of Therapeutic Rec Session Therapeutic Rec Assessment   General Information   Patient Profile Review See Profile for full history and prior level of function   Daily Contact with Relatives or Friends Phone call;Visit   Community Involvement   Community Involvement Retired   Spiritual Practice [employer];Not connected/interested   Hobbies/Interests   Word Puzzles Word Search   Music   Music Preferences Country;Rock   Listens to Recorded Music Yes   Media   Computer Has own at home;Will use tablet/phone   TV / Movies TV   Sports / Physical Activities   Sports Fan Golf;Hockey;Football   Impression   Open to Socializing with Others Independent   Barriers to Leisure Endurance;Mobility   Treatment Plan   Type of Intervention Independent with activity   Equipment and Supplies While on Unit Puzzle books   Assessment Assesment completed, pt was oriented to role of rec therapy and provided with leisure resource list. Pt expressed interest in word searches, which were provided. Will monitor and provide materials as requested.

## 2024-06-04 NOTE — PLAN OF CARE
Goal Outcome Evaluation:      Plan of Care Reviewed With: patient    Overall Patient Progress: no change    Pt is alert and oriented x 3, can make needs known. Call light appropriate. Pure wick on at HS, Pt is able to take pills whole with thin liquid, on gluten free diet with 2 gm of potassium. Pt is incontinent of Bowel. On fluid restriction 1500 ml. Nicotine patch on left shoulder, intact. BP elevated but trending downwards. Pt is assist of 2 with sliding board for transfer, last BM 6/3.  Wound Vac on Right thigh and right AKA, intact, running continuous at 125 mmHg. Pt is a daily weight. Dressing to Coccyx is intact. Pt denies SOB, CP and no n/v. Pain managed by PRN oxycodone given x 1. No acute issue call light within reach. Continue with plan of care.           Patient's most recent vital signs are:     Vital signs:  BP: 154/64  Temp: 98.6  HR: 60  RR: 16  SpO2: 95 %     Patient does not have new respiratory symptoms.  Patient does not have new sore throat.  Patient does not have a fever greater than 99.5.

## 2024-06-04 NOTE — PLAN OF CARE
Goal Outcome Evaluation:      Plan of Care Reviewed With: patient    Overall Patient Progress: no changeOverall Patient Progress: no change      Patient is alert and oriented x4. Able to make needs known. Pt denied SOB, N/V and chest pain. Pt is on gluten free with 2 grams K diet and fluid restriction. Wound vac is functioning properly at 125 mmHg with right AKA dressing that is clean and intact. Sacral dressing is clean, and intact. Pt requested PRN Tylenol for headache. Pt is assist of 2 with sliding board. Continent of bowel and bladder. Call light within reach.    Patient's most recent vital signs are:     Vital signs:  BP: 156/67  Temp: 97.2  HR: 62  RR: 17  SpO2: 94 %     Patient does not have new respiratory symptoms.  Patient does not have new sore throat.  Patient does not have a fever greater than 99.5.      Problem: Pain Chronic (Persistent)  Goal: Optimal Pain Control and Function  Outcome: Progressing     Problem: Skin Injury Risk Increased  Goal: Skin Health and Integrity  Outcome: Progressing     Problem: Fall Injury Risk  Goal: Absence of Fall and Fall-Related Injury  Outcome: Progressing     Problem: Gas Exchange Impaired  Goal: Optimal Gas Exchange  Outcome: Progressing

## 2024-06-04 NOTE — PLAN OF CARE
Care Coordination:     Writer placed call to [facility]- [phone]. Spoke with , requesting to speak with agent assigned to the order Zunilda Pollard. He was not able to reach her- requested urgent call back to check on status of Vac.   Approval still pending at this time.     Addendum: 4:06 PM  Writer spoke with Zunilda Pollard, awaiting to hear back from patients insurance on this. Zunilda was going Follow-up w/ Insurance again before end of day. Vac should be release in time for discharge.     Writer updated patient on HC status. Patient will have HC through [facility]. Listed in AVS. Sticky note requesting orders from provider.     Home Health  [facility]  PH- [phone] Fax-[phone]  -Nurse, physical therapy, and occupational therapy,

## 2024-06-04 NOTE — PROGRESS NOTES
CLINICAL NUTRITION SERVICES - ASSESSMENT NOTE     Nutrition Prescription    RECOMMENDATIONS FOR MDs/PROVIDERS TO ORDER:  Encourage intakes    Malnutrition Status:    Moderate malnutrition in the context of acute illness    Recommendations already ordered by Registered Dietitian (RD):  Discussed low potassium diet  Margarita Garrison and Lilibeth daily     Future/Additional Recommendations:  Monitor labs, intakes, and weight trends.  Monitor supplement preference     REASON FOR ASSESSMENT  Shirley Hendricks is a/an 63 year old female assessed by the dietitian for LOS    NUTRITION/MEDICAL HISTORY  History of Charcot-Breonna Tooth disease, severe PAD, SLE, DM II, HTN, HLD, CAD, GERD, SVT, possible celiac disease, prior B cell lymphoma (outpatient surveillance), asthma/COPD, and depression/anxiety who was admitted to [facility] 3/29-4/22/24 with right limb ischemia in setting of severe PAD ultimately requiring right AKA with complicated post op course (including LIMA requiring HD) after which she was discharged to  ARU. She was readmitted to [facility] 5/15-5/29/24 with stump eschars requiring surgical debridement and wound VAC placement. Her dialysis catheter has been removed. Now transferred to  TCU for ongoing rehabilitation and wound cares.     FINDINGS  RD met with pt at bedside. Pt report she feels she is very limited by her diet given the low potassium and gluten free restrictions.Pt states she does not always finish her meals so she feels she is restricted too much per meal. Pt was previously given incorrect menu for gluten free and was told she could get an item that is no longer on the menu which patient was upset about. Pt with some understanding of high potassium foods (potatoes) however has not received education materials regarding low potassium diet. Pt states she feels her high potassium is likely due to eating more potatoes than she ever does at home. Provided correct gluten free menu with menu items  "unavailable on low potassium diet crossed out and handouts on potassium content of foods. Offered oral nutrition supplements. Pt willing to try Ezoic and Nepro.     CURRENT NUTRITION ORDERS  Diet:  Gluten Free, 2 g Potassium, and 1500 mL Fluid Restriction    Intake/Tolerance: 25-75% of documented meals. Poorly documented     Pt ordering (on average) 1400 kcal, 70 g protein and 1915 mg potassium per day per [employer].  With documented intakes pt is likely meeting 60% minimum energy and protein needs and eating < 1000 mg potassium.    LABS   05/29/24 07:32 05/30/24 06:06 06/01/24 06:27   Sodium 129 (L) 130 (L) 132 (L)   Potassium 4.8 5.0 4.9   Urea Nitrogen 59.4 (H) 62.6 (H) 62.0 (H)   Creatinine 2.85 (H) 2.96 (H) 2.45 (H)   Phosphorus 5.7 (H)     Glucose 70 77 73     MEDICATIONS  Medications reviewed: pepcid, culturell, renal multivitamin, Renagel, expedite daily, oxycodone PRN  IV Fluids over last 7 days? No    ANTHROPOMETRICS  Height: 154.9 cm (5' 0.98\")  Most Recent Weight: 56.7 kg (125 lb)    IBW: 43 kg (132% IBW) - adjusted for AKA  BMI: Overweight BMI 25-29.9  Weight History: Pt with limited weight history prior to admission,with 13.5 lb (12%) weight gain over 6 months.    Wt Readings from Last Encounters:   05/31/24 56.7 kg (125 lb)   05/29/24 56.8 kg (125 lb 3.5 oz)    05/29/24 62.3 kg (137 lb 5.6 oz)   05/14/24 57 kg (125 lb 10.6 oz)   04/22/24 50.5 kg (111 lb 5.3 oz)   01/08/24 49.8 kg (109 lb 12.8 oz)   12/12/23 50.8 kg (112 lb)   11/21/23 50.6 kg (111 lb 8 oz)   11/13/23 51.6 kg (113 lb 11.2 oz)   10/11/23 52.5 kg (115 lb 11.2 oz)   08/02/23 52.3 kg (115 lb 6.4 oz)   05/26/23 52.1 kg (114 lb 12.8 oz)   12/28/22 51.1 kg (112 lb 11.2 oz)   12/27/22 51.7 kg (114 lb)   12/09/22 51.7 kg (113 lb 14.4 oz)   02/15/22 46.3 kg (102 lb)   02/11/22 48.4 kg (106 lb 9.6 oz)   08/16/21 53.4 kg (117 lb 11.6 oz)   07/07/21 48.1 kg (106 lb)   06/23/21 49.1 kg (108 lb 3 oz)   06/21/21 49.4 kg (109 lb)     Dosing " Weight: 46 kg - ABW using most recent weight and IBW of 43 kg.     ASSESSED NUTRITION NEEDS  Estimated Energy Needs: 2130-1958+ kcals/day (25 - 30+ kcals/kg)  Justification: Maintenance vs Increased needs (aim on higher end for wound healing)  Estimated Protein Needs: 60-83 grams protein/day (1.3 - 1.8 grams of pro/kg)  Justification: Dialysis and Wound healing  Estimated Fluid Needs: 1500 mL/day  Justification: On a fluid restriction    PHYSICAL FINDINGS  See malnutrition section below.  Sacral wound, AKA with wound vac  Dentition: Patient with own teeth, no pain/difficulty chewing/swallowing    MALNUTRITION  % Intake: < 75% for  >7 days (moderate)  % Weight Loss: None noted  Subcutaneous Fat Loss: None observed  Muscle Loss: Global mild  Fluid Accumulation/Edema: None noted  Malnutrition Diagnosis: Moderate malnutrition in the context of acute illness    NUTRITION DIAGNOSIS  Inadequate oral intake related to restrictive diet and poor appetite as evidenced by patient report and documented intakes      INTERVENTIONS  Implementation  Nutrition education for nutrition relationship to health/disease   Medical food supplement therapy     Goals  Patient to consume % of nutritionally adequate meal trays TID, or the equivalent with supplements/snacks.     Monitoring/Evaluation  Progress toward goals will be monitored and evaluated per protocol.    Eleni Avalos MS, RDN, LD  TCU/OB/Ortho Clinical Dietitian  Available via phone and Vocera  Phone: [phone]  Vocera: TCU Clinical Dietitian, Ortho Clinical Dietitian, 4B OB Clinical Dietitian, [facility] 4C OB Clinical Dietitian, [employer] Clinical Dietitian  Weekend/Holiday Vocera: Weekend Holiday Clinical Dietitian [Multi Site Groups]

## 2024-06-04 NOTE — TELEPHONE ENCOUNTER
Writer called, spoke to Chelsie. Patient is open to other locations. There is no availability at any location right now. Will wait a day or so to see if cancellations arise, otherwise will suggest outside referral

## 2024-06-04 NOTE — TELEPHONE ENCOUNTER
[facility] Call Center    Phone Message    May a detailed message be left on voicemail: No      Reason for Call: Other: Chelsie from [facility] calling to follow up on this.Please call Chelsie at [phone] to discuss. They do not receive Voicemails on this phone. If unable to reach hospital please call pt directly. Thanks       Action Taken: Other: uro    Travel Screening: Not Applicable     Date of Service:

## 2024-06-05 ENCOUNTER — APPOINTMENT (OUTPATIENT)
Dept: PHYSICAL THERAPY | Facility: SKILLED NURSING FACILITY | Age: 64
DRG: 565 | End: 2024-06-05
Attending: INTERNAL MEDICINE
Payer: COMMERCIAL

## 2024-06-05 LAB
ANION GAP SERPL CALCULATED.3IONS-SCNC: 9 MMOL/L (ref 7–15)
BUN SERPL-MCNC: 55.6 MG/DL (ref 8–23)
CALCIUM SERPL-MCNC: 7.9 MG/DL (ref 8.8–10.2)
CHLORIDE SERPL-SCNC: 101 MMOL/L (ref 98–107)
CREAT SERPL-MCNC: 1.94 MG/DL (ref 0.51–0.95)
DEPRECATED HCO3 PLAS-SCNC: 21 MMOL/L (ref 22–29)
EGFRCR SERPLBLD CKD-EPI 2021: 28 ML/MIN/1.73M2
GLUCOSE SERPL-MCNC: 114 MG/DL (ref 70–99)
HOLD SPECIMEN: NORMAL
POTASSIUM SERPL-SCNC: 4.8 MMOL/L (ref 3.4–5.3)
SODIUM SERPL-SCNC: 131 MMOL/L (ref 135–145)

## 2024-06-05 PROCEDURE — 97606 NEG PRS WND THER DME>50 SQCM: CPT

## 2024-06-05 PROCEDURE — 80048 BASIC METABOLIC PNL TOTAL CA: CPT | Performed by: PHYSICIAN ASSISTANT

## 2024-06-05 PROCEDURE — 250N000013 HC RX MED GY IP 250 OP 250 PS 637: Performed by: PHYSICIAN ASSISTANT

## 2024-06-05 PROCEDURE — 120N000009 HC R&B SNF

## 2024-06-05 PROCEDURE — G0463 HOSPITAL OUTPT CLINIC VISIT: HCPCS | Mod: 25

## 2024-06-05 PROCEDURE — 36416 COLLJ CAPILLARY BLOOD SPEC: CPT | Performed by: PHYSICIAN ASSISTANT

## 2024-06-05 PROCEDURE — 250N000011 HC RX IP 250 OP 636: Performed by: PHYSICIAN ASSISTANT

## 2024-06-05 PROCEDURE — 97530 THERAPEUTIC ACTIVITIES: CPT | Mod: GP | Performed by: REHABILITATION PRACTITIONER

## 2024-06-05 RX ADMIN — BUMETANIDE 2 MG: 2 TABLET ORAL at 09:09

## 2024-06-05 RX ADMIN — FAMOTIDINE 20 MG: 20 TABLET ORAL at 09:10

## 2024-06-05 RX ADMIN — AMLODIPINE BESYLATE 10 MG: 10 TABLET ORAL at 09:09

## 2024-06-05 RX ADMIN — HEPARIN SODIUM 5000 UNITS: 5000 INJECTION, SOLUTION INTRAVENOUS; SUBCUTANEOUS at 21:21

## 2024-06-05 RX ADMIN — Medication 1 CAPSULE: at 13:22

## 2024-06-05 RX ADMIN — CETIRIZINE HYDROCHLORIDE 5 MG: 5 TABLET ORAL at 09:09

## 2024-06-05 RX ADMIN — GABAPENTIN 200 MG: 100 CAPSULE ORAL at 21:21

## 2024-06-05 RX ADMIN — CLOPIDOGREL BISULFATE 75 MG: 75 TABLET ORAL at 09:10

## 2024-06-05 RX ADMIN — NICOTINE 1 PATCH: 14 PATCH, EXTENDED RELEASE TRANSDERMAL at 09:13

## 2024-06-05 RX ADMIN — CARVEDILOL 12.5 MG: 12.5 TABLET, FILM COATED ORAL at 17:33

## 2024-06-05 RX ADMIN — HEPARIN SODIUM 5000 UNITS: 5000 INJECTION, SOLUTION INTRAVENOUS; SUBCUTANEOUS at 06:36

## 2024-06-05 RX ADMIN — HEPARIN SODIUM 5000 UNITS: 5000 INJECTION, SOLUTION INTRAVENOUS; SUBCUTANEOUS at 13:22

## 2024-06-05 RX ADMIN — CARVEDILOL 12.5 MG: 12.5 TABLET, FILM COATED ORAL at 09:09

## 2024-06-05 RX ADMIN — MICONAZOLE NITRATE: 20 POWDER TOPICAL at 21:22

## 2024-06-05 RX ADMIN — Medication 1 CAPSULE: at 21:21

## 2024-06-05 RX ADMIN — OXYCODONE HYDROCHLORIDE 5 MG: 5 TABLET ORAL at 09:44

## 2024-06-05 RX ADMIN — SEVELAMER HYDROCHLORIDE 400 MG: 400 TABLET, FILM COATED ORAL at 13:22

## 2024-06-05 RX ADMIN — FLUTICASONE FUROATE AND VILANTEROL TRIFENATATE 1 PUFF: 200; 25 POWDER RESPIRATORY (INHALATION) at 09:10

## 2024-06-05 RX ADMIN — SEVELAMER HYDROCHLORIDE 400 MG: 400 TABLET, FILM COATED ORAL at 09:10

## 2024-06-05 RX ADMIN — ROSUVASTATIN CALCIUM 10 MG: 5 TABLET, FILM COATED ORAL at 21:21

## 2024-06-05 RX ADMIN — OXYCODONE HYDROCHLORIDE 5 MG: 5 TABLET ORAL at 21:21

## 2024-06-05 RX ADMIN — SEVELAMER HYDROCHLORIDE 400 MG: 400 TABLET, FILM COATED ORAL at 17:33

## 2024-06-05 RX ADMIN — Medication 1 CAPSULE: at 09:09

## 2024-06-05 RX ADMIN — Medication 60 ML: at 09:46

## 2024-06-05 RX ADMIN — ACETAMINOPHEN 1000 MG: 325 TABLET, FILM COATED ORAL at 09:44

## 2024-06-05 ASSESSMENT — ACTIVITIES OF DAILY LIVING (ADL)
ADLS_ACUITY_SCORE: 54

## 2024-06-05 NOTE — CARE PLAN
Care Plan updated. Bedside RN to assess on progression and update.     Problem: Fall Injury Risk  Goal: Absence of Fall and Fall-Related Injury  Description: Provide a safe, barrier-free environment that encourages independent activity.    Keep care area uncluttered and well-lighted.    Determine need for increased observation or monitoring.    Avoid use of devices that minimize mobility, such as restraints or indwelling urinary catheter.    Determine need for bed/chair alarms.         Problem: Skin Injury Risk Increased  Goal: Skin Health and Integrity  Description: Perform a full pressure injury risk assessment, as indicated by screening, upon admission to care unit.    Reassess skin (full inspection and injury risk, including skin temperature, consistency and color) frequently (e.g., scheduled interval, with change in condition) to provide optimal early detection and prevention.    Maintain adequate tissue perfusion (e.g., encourage fluid balance; avoid crossing legs, constrictive clothing or devices) to promote tissue oxygenation.    Maintain head of bed at lowest degree of elevation tolerated, considering medical condition and other restrictions. Use positioning supports to prevent sliding and friction. Consider low friction textiles.    Avoid positioning onto an area that remains reddened or on bony prominences.           Problem: Pain Chronic (Persistent)  Goal: Optimal Pain Control and Function  Description: Evaluate pain level, effect of treatment and patient response at regular intervals.    Minimize pain stimuli; coordinate care and adjust environment (e.g., light, noise, unnecessary movement); promote sleep/rest.    Match pharmacologic analgesia to severity and type of pain mechanism (e.g., neuropathic, muscle, inflammatory); consider multimodal approach (e.g., nonopioid, opioid, adjuvant).    Provide medication at regular intervals; titrate to patient response.    Manage breakthrough pain with  additional doses; consider rotation or switching medicate         Problem: Gas Exchange Impaired  Goal: Optimal Gas Exchange  Description: Assess and monitor airway, breathing and circulation for effective oxygenation and ventilation; consider oxygenation and ventilation parameters and goal.    Anticipate noninvasive and invasive monitoring (e.g., pulse oximetry, end-tidal carbon dioxide, blood gases, cardiovascular).    Maintain head of bed elevation with regular position changes to minimize ventilation-perfusion mismatch and breathlessness.    Provide oxygen therapy judiciously to avoid hyperoxemia; adjust to achieve oxygenation goal.    Monitor fluid balance closely to minimize the risk of fluid overload.         Problem: BADL (Basic Activities of Daily Living) Impairment  Goal: Optimal Safe BADL Performance  Description: Assess BADL (basic activity of daily living) abilities; encourage participation at maximally safe independent level.    Provide assistance and supervision needed to maintain safety; involve caregiver in BADL (basic activity of daily living) training.    Ensure effective use of equipment or devices, such as a long-handled reacher, shower seat or orthosis.    Ensure proper body mechanics and positioning for optimal task performance.    Provide set-up of items if patient is unable to retrieve; store personal care items in accessible location.    Schedule BADL (basic activity of daily living) activities when pain and fatigue are at a minimum; pace activity to conserve energy.         Problem: Wound  Goal: Optimal Wound Healing  Description: Use a standard wound assessment tool to monitor progression of wound healing.    Perform a nutrition screening or assessment and physical exam; assess adequacy of oral intake and risk of vitamin and mineral deficiency; if suspect inadequacy or deficiency provide supplementation.    Optimize fluids, nutritional intake, sleep/rest and glycemic control to enhance  healing.    Consider oxygen therapy in the presence of hypoxia to enhance tissue oxygenation.    Position to avoid tension or pressure on wound surface.  Manage edema (e.g., elevate extremities) and maintain blood pressure to optimize tissue perfusion.    Provide nonpharmacologic and pharmacologic comfort measures to manage pain and minimize vasoconstriction; premedicate for painful procedures.    Provide wound care, such as cleansing, debridement, topical therapy, appropriate dressing selection to promote wound healing and prevent infection.    Maintain sterile and occlusive dressing, if prescribed; minimize dressing changes to decrease infection risk and trauma to the wound bed.           Problem: COPD (Chronic Obstructive Pulmonary Disease)  Goal: Effective Oxygenation and Ventilation  Description: Assess and monitor airway, breathing and circulation for effective oxygenation and ventilation; consider oxygenation and ventilation parameters and goal.    Anticipate the need for breathing techniques and activity pacing to minimize fatigue and breathlessness.    Maintain head of bed elevation with regular position changes to minimize ventilation-perfusion mismatch and breathlessness.    Provide oxygen therapy judiciously to avoid hyperoxemia; adjust to achieve oxygenation goal.    Minimize environmental irritants and odors that may contribute to breathlessness and wheezing.    Monitor fluid balance closely to minimize the risk of fluid overload.    Consider positive pressure ventilation to enhance oxygenation and ventilation, as well as reduce work of breathing.    Maintain close surveillance for deterioration in clinical status that requires intubation and mechanical ventilation.         Problem: Depressive Signs/Symptoms  Goal: Improved Mood Symptoms (Depressive Signs/Symptoms)  Description: Assess subjective and objective presentation of mood and emotional state.     Encourage and promote emotional awareness,  acceptance and expression of feelings.     Provide psychoeducation and supportive therapy interventions to improve mood and emotions.     Encourage participation in therapeutic leisure and recreational activity to assist mood and emotion regulation.     Utilize positive communication to encourage, affirm and acknowledge progress, as well as convey hope.     Utilize cognitive-behavioral therapies to address distorted, maladaptive, inaccurate thoughts and images.        Problem: Anxiety Signs/Symptoms  Goal: Improved Mood Symptoms (Anxiety Signs/Symptoms)  Description: Assess subjective and objective presentation of mood and emotional state.    Encourage and promote emotional awareness, acceptance and expression of feelings.    Provide psychoeducation and supportive therapy interventions to improve mood and emotions.    Utilize motivational interviewing techniques to promote motivation for change, treatment adherence and relapse prevention

## 2024-06-05 NOTE — PROGRESS NOTES
[facility]  WO Nurse Inpatient Assessment     Consulted for: New consult for Right AKA/thigh    Summary: Right thigh wound vac changed 5/24. [facility] has signed off on coccyx. Dressing changes to be performed on Tu/F the week of Memorial Day due to the holiday, then will resume [facility] dressing changes.     Patient History (according to provider note(s):      Shirley Hendricks is a 63 year old woman with a history of Charcot-Breonna Tooth disease, severe PAD, SLE, DM II, HTN, HLD, CAD, GERD, SVT, possible celiac disease, prior B cell lymphoma (outpatient surveillance), asthma/COPD, and depression/anxiety who was admitted to [facility] 3/29-4/22/24 with right limb ischemia in setting of severe PAD ultimately requiring right AKA with complicated post op course (including LIMA requiring HD) after which she was discharged to  ARU. She was readmitted to [facility] 5/15-5/29/24 with stump eschars requiring surgical debridement and wound VAC placement. Her dialysis catheter has been removed. Now transferred to  TCU for ongoing rehabilitation and wound cares.     Assessment:      Areas visualized during today's visit: Focused:R AKA/Thigh    Negative pressure wound therapy applied to: Right AKA and Right Thigh  Right anterior leg    Right posterior leg          5/31 (additional pictures available in media tab)    6/5 6/5    Wound due to: Surgical Wound   Wound history/plan of care:    Surgical date: 5/20/24   Service following: Vascular  Date Negative Pressure Wound Therapy initiated: 5/20/24   Interventions in place: repositioning, pressure redistribution with device or dressing, specialty surface in use, moisture/incontinence management, offloading, and elevation  Is patient s nutritional status compromised? NO, but reports little appetite  If yes, what interventions are in place? Protein supplements  Reason for initiating vac therapy? Need for accelerated granulation  tissue  Which?of?the?following?co-morbidities?apply? Immobility and PAD  If diabetic is patient on a diabetic management program? N/A   Is osteomyelitis present in wound? no   If yes what treatments are in place? N/A      Wound base: right thigh: Mix of granuar tissue, muscle, slough. , Right medial: 80 % granulation tissue and muscle 20% slough     Palpation of the wound bed: normal       Drainage: small      Volume in cannister: <50ml     Last cannister change date: 5/29/24     Description of drainage: serosanguinous and bloody      Measurements (length x width x depth, in cm) Right Thigh: 14 x 7 x 0.5cm Right medial AKA: 1.7 (positional) x 2.5 x 1cm       Tunneling N/A      Undermining resolved 5/31  Periwound skin: Edematous and Erythema- blanchable       Color: pink       Temperature: normal    Odor: none   Pain: moderate, during tape removal aching, sharp, and right-sided   Pain intervention prior to dressing change: oral oxycodone, soaking, and slow and gentle cares   Treatment goal: Heal , Drainage control, Infection control/prevention, Increase granulation, Maintain (prevention of deterioration), Pain control, Protection, and Promote epidermal migration  STATUS: improving   Supplies ordered: gathered, supplies stored on unit, and discussed with patient     Number of foam pieces removed from a wound (excluding foam for bridge) :  2 GranuFoam Black   Verified this matched the number of foam pieces applied last dressing change: Yes   Number of foam pieces packed into wound (excluding foam for bridge) :  2 GranuFoam Black            Treatment Plan:     Negative pressure wound therapy plan:  Wound location: Right thigh and right AKA   Change Days: Tu/F the week of 5/28 then  Mon/Wed/Fri by WOC RN    Supplies (including all accessories) used: large Black foam , Adaptic/Curity oil emulsion contact layer , and Cavilon no sting barrier film  Cleanse with MicroKlenz prior to replacing NPWT  Suction setting: -125  "  Methods used: Window paned all periwound skin with vac drape prior to applying sponge, Bridged trac pad off bony prominences, Spiral cut foam prior to packing into wound , and Cut foam in half to reduce profile    Staff RN to assess integrity of dressing and ensure suction is set at appropriate level every shift.   Date canister. Chart canister output every shift. Change cannister weekly and PRN if full/occluded     Remove foam dressing and replace with BID normal saline moist gauze dressing if:   -a dressing failure which cannot be repaired within 2 hours   -patient is discharging to home without a home pump   -patient is discharging to a facility outside the local area   -if a dressing is a \"Silver Foam\", remove before Radiation Therapy or MRI     The hospital VAC pump is not to be discharged with the patient.?Ensure to disconnect patient from machine prior to discharge. Then,    - If a home KCI VAC pump has been delivered, connect home cannister to dressing tubing then connect cannister to home pump and turn on machine    - If transferring to a nearby facility with a KCI vac, can disconnect and clamp tubing then cover end with glove so can be reconnected within 2 hours       Coccyx/Sacrum - newly healed area: Apply Sacral Mepilex every other day and prn if soiled or damaged     Pressure Injury Prevention (PIP) Plan:  If patient is declining pressure injury prevention interventions: Explore reason why and address patient's concerns, Educate on pressure injury risk and prevention intervention(s), If patient is still declining, document \"informed refusal\" , and Ensure Care team is aware ( provider, charge nurse, etc)  Mattress: Follow bed algorithm, reassess daily and order specialty mattress, if indicated.  HOB: Maintain at or below 30 degrees, unless contraindicated  Repositioning in bed: Every 1-2 hours , Left/right positioning; avoid supine, and Raise foot of bed prior to raising head of bed, to reduce patient " sliding down (shear)  Heels: Keep elevated off mattress and Pillows under calves  Protective Dressing: Sacral Mepilex for prevention (#[id number]),  especially for the agitated patient   Positioning Equipment:None  Chair positioning: Repositions self: patient to shift weight every 15 minutes, Assist patient to reposition hourly, and Do NOT use a donut for sitting (this increases pressure to smaller area and creates a higher potential for injury)    If patient has a buttock pressure injury, or high risk for PI use chair cushion or SPS.  Moisture Management: Perineal cleansing /protection: Follow Incontinence Protocol, Avoid brief in bed, Clean and dry skin folds with bathing , and Moisturize dry skin  Under Devices: Inspect skin under all medical devices during skin inspection , Ensure tubes are stabilized without tension, and Ensure patient is not lying on medical devices or equipment when repositioned  Ask provider to discontinue device when no longer needed.     Orders: Reviewed, Written, and Updated    RECOMMEND PRIMARY TEAM ORDER: None, at this time  Education provided: importance of repositioning, plan of care, wound progress, and Off-loading pressure  Discussed plan of care with: Patient and Nurse  WOC nurse follow-up plan: Monday/WednesdayFriday  Notify WOC if wound(s) deteriorate.  Nursing to notify the Provider(s) and re-consult the WOC Nurse if new skin concern.    DATA:     Current support surface: Standard  Standard Isoflex gel  Containment of urine/stool: Incontinent pad in bed  BMI: Body mass index is 23.63 kg/m .   Active diet order: Orders Placed This Encounter      Combination Diet Gluten Free Diet; 2 gm K Diet     Output: I/O last 3 completed shifts:  In: 200 [P.O.:200]  Out: 650 [Urine:650]     Labs:   Recent Labs   Lab 06/04/24  0921   HGB 8.4*   WBC 5.9     Pressure injury risk assessment:   Sensory Perception: 3-->slightly limited  Moisture: 3-->occasionally moist  Activity:  2-->chairfast  Mobility: 2-->very limited  Nutrition: 3-->adequate  Friction and Shear: 2-->potential problem  Cesar Score: 15    Xenia Pelayo RN BSN CWOCN  Dept. Vocera- Contact [facility] Nurse [facility]  Dept. Office Number: *[phone]

## 2024-06-05 NOTE — PLAN OF CARE
Goal Outcome Evaluation:      Plan of Care Reviewed With: patient    Overall Patient Progress: no change    No acute issue in the night, pt comfortable in bed. Call light appropriate, can make needs known. Wound vac running at -125 mmHg with no alarms. Pt on fluid restriction, 1500 ml.on gluten free diet, 2 gm of K. Assist of 2 with sliding board for transfer. Pain managed by PRN oxycodone given x 1. Pt is incontinent for bowel and bladder, pure wick on. Dressing on coccyx CDI. Incontinent care provided. Call light within reach, continue with plan of care.       Patient's most recent vital signs are:     Vital signs:  BP: 156/67  Temp: 97.6  HR: 62  RR: 18  SpO2: 94 %     Patient does not have new respiratory symptoms.  Patient does not have new sore throat.  Patient does not have a fever greater than 99.5.

## 2024-06-05 NOTE — PLAN OF CARE
Goal Outcome Evaluation:      Plan of Care Reviewed With: patient    Overall Patient Progress: no changeOverall Patient Progress: no change      Patient is alert and oriented x4. Able to make needs known. Pt denied SOB, N/V and chest pain. Pt is on gluten free with 2 grams K diet and fluid restriction. Wound vac is functioning properly at 125 mmHg with right AKA dressing that was changed today by [facility] nurse. Pt received PRN Oxycodone and Tylenol for lower back/buttocks/ right AKA site pain. Pt is assist of 2 with sliding board. Continent of bowel and bladder. Call light within reach.      Patient's most recent vital signs are:     Vital signs:  BP: 156/60  Temp: 98.5  HR: 64  RR: 18  SpO2: 93 %     Patient does not have new respiratory symptoms.  Patient does not have new sore throat.  Patient does not have a fever greater than 99.5.      Problem: Wound  Goal: Optimal Wound Healing  Description: Use a standard wound assessment tool to monitor progression of wound healing.    Perform a nutrition screening or assessment and physical exam; assess adequacy of oral intake and risk of vitamin and mineral deficiency; if suspect inadequacy or deficiency provide supplementation.    Optimize fluids, nutritional intake, sleep/rest and glycemic control to enhance healing.    Consider oxygen therapy in the presence of hypoxia to enhance tissue oxygenation.    Position to avoid tension or pressure on wound surface.  Manage edema (e.g., elevate extremities) and maintain blood pressure to optimize tissue perfusion.    Provide nonpharmacologic and pharmacologic comfort measures to manage pain and minimize vasoconstriction; premedicate for painful procedures.    Provide wound care, such as cleansing, debridement, topical therapy, appropriate dressing selection to promote wound healing and prevent infection.    Maintain sterile and occlusive dressing, if prescribed; minimize dressing changes to decrease infection risk and trauma to the  wound bed.      6/5/2024 1153 by Juan Carlos Sue RN  Outcome: Progressing  6/5/2024 1153 by Juan Carlos Sue RN  Outcome: Progressing     Problem: COPD (Chronic Obstructive Pulmonary Disease)  Goal: Effective Oxygenation and Ventilation  Description: Assess and monitor airway, breathing and circulation for effective oxygenation and ventilation; consider oxygenation and ventilation parameters and goal.    Anticipate the need for breathing techniques and activity pacing to minimize fatigue and breathlessness.    Maintain head of bed elevation with regular position changes to minimize ventilation-perfusion mismatch and breathlessness.    Provide oxygen therapy judiciously to avoid hyperoxemia; adjust to achieve oxygenation goal.    Minimize environmental irritants and odors that may contribute to breathlessness and wheezing.    Monitor fluid balance closely to minimize the risk of fluid overload.    Consider positive pressure ventilation to enhance oxygenation and ventilation, as well as reduce work of breathing.    Maintain close surveillance for deterioration in clinical status that requires intubation and mechanical ventilation.    6/5/2024 1153 by Juan Carlos Sue RN  Outcome: Progressing  6/5/2024 1153 by Juan Carlos Sue RN  Outcome: Progressing     Problem: Depressive Signs/Symptoms  Goal: Improved Mood Symptoms (Depressive Signs/Symptoms)  Description: Assess subjective and objective presentation of mood and emotional state.     Encourage and promote emotional awareness, acceptance and expression of feelings.     Provide psychoeducation and supportive therapy interventions to improve mood and emotions.     Encourage participation in therapeutic leisure and recreational activity to assist mood and emotion regulation.     Utilize positive communication to encourage, affirm and acknowledge progress, as well as convey hope.     Utilize cognitive-behavioral therapies to address distorted, maladaptive,  inaccurate thoughts and images.     6/5/2024 1153 by Juan Carlos Sue RN  Outcome: Progressing  6/5/2024 1153 by Juan Carlos Sue RN  Outcome: Progressing     Problem: Gas Exchange Impaired  Goal: Optimal Gas Exchange  Description: Assess and monitor airway, breathing and circulation for effective oxygenation and ventilation; consider oxygenation and ventilation parameters and goal.    Anticipate noninvasive and invasive monitoring (e.g., pulse oximetry, end-tidal carbon dioxide, blood gases, cardiovascular).    Maintain head of bed elevation with regular position changes to minimize ventilation-perfusion mismatch and breathlessness.    Provide oxygen therapy judiciously to avoid hyperoxemia; adjust to achieve oxygenation goal.    Monitor fluid balance closely to minimize the risk of fluid overload.    6/5/2024 1153 by Juan Carlos Sue RN  Outcome: Progressing  6/5/2024 1153 by Juan Carlos Sue RN  Outcome: Progressing     Problem: Pain Chronic (Persistent)  Goal: Optimal Pain Control and Function  Description: Evaluate pain level, effect of treatment and patient response at regular intervals.    Minimize pain stimuli; coordinate care and adjust environment (e.g., light, noise, unnecessary movement); promote sleep/rest.    Match pharmacologic analgesia to severity and type of pain mechanism (e.g., neuropathic, muscle, inflammatory); consider multimodal approach (e.g., nonopioid, opioid, adjuvant).    Provide medication at regular intervals; titrate to patient response.    Manage breakthrough pain with additional doses; consider rotation or switching medicate    6/5/2024 1153 by Juan Carlos Sue RN  Outcome: Progressing  6/5/2024 1153 by Juan Carlos Sue RN  Outcome: Progressing     Problem: Fall Injury Risk  Goal: Absence of Fall and Fall-Related Injury  Description: Provide a safe, barrier-free environment that encourages independent activity.    Keep care area uncluttered and  well-lighted.    Determine need for increased observation or monitoring.    Avoid use of devices that minimize mobility, such as restraints or indwelling urinary catheter.    Determine need for bed/chair alarms.    6/5/2024 1153 by Juan Carlos Sue RN  Outcome: Progressing  6/5/2024 1153 by Juan Carlos Sue RN  Outcome: Progressing     Problem: Skin Injury Risk Increased  Goal: Skin Health and Integrity  Description: Perform a full pressure injury risk assessment, as indicated by screening, upon admission to care unit.    Reassess skin (full inspection and injury risk, including skin temperature, consistency and color) frequently (e.g., scheduled interval, with change in condition) to provide optimal early detection and prevention.    Maintain adequate tissue perfusion (e.g., encourage fluid balance; avoid crossing legs, constrictive clothing or devices) to promote tissue oxygenation.    Maintain head of bed at lowest degree of elevation tolerated, considering medical condition and other restrictions. Use positioning supports to prevent sliding and friction. Consider low friction textiles.    Avoid positioning onto an area that remains reddened or on bony prominences.      6/5/2024 1153 by Juan Carlos Sue RN  Outcome: Progressing  6/5/2024 1153 by Juan Carlos Sue RN  Outcome: Progressing

## 2024-06-05 NOTE — PROGRESS NOTES
TCU Notice of Medicare Non-Coverage Note:     Met with patient to Introduce self and role.     Discussed Notice of Medicare Non-Coverage and last day of coverage as required for all patients with Medicare coverage during Skilled Nursing Facility (SNF) stays.Informed patient/family that Medicare covers stay until midnight of day before discharge. TCU does not bill for discharge date.    Last coverage day is 6/7/24. Discharge date expected 6/8/24.    Opportunity provided for questions and education provided regarding potential financial impact to patient.     Patient verbalize(s) understanding of discussion.     Her sister will pick her up at 11:30 am on day of discharge.     DAWN Owens   [facility], Transitional Care Unit   Social Work   [facility] [address], [address]th [place] [address]  (ph) [phone]

## 2024-06-05 NOTE — PROGRESS NOTES
Brief Medicine Note:    IM following BMP from 6/4 which was drawn but never resulted. Per Epic note, there was an issue with instrumentation which appears to have effected result for this patient. BP elevated to 170s/60s in the past 24 hours but is improved this am  - Repeat BMP ordered now, further plan pending result  - Continue current amlodipine, bumex, coreg, and prn hydralazine but may need changes pending trend in the next few days  ** Addendum: BMP with stable Na 131 (132) and within recent BL. Cr continues to improve to 1.94 (2.45). Continue BMP Tu/Th/Sa as planned    Opal Zurita PA-C  Internal Medicine [facility]

## 2024-06-06 ENCOUNTER — APPOINTMENT (OUTPATIENT)
Dept: OCCUPATIONAL THERAPY | Facility: SKILLED NURSING FACILITY | Age: 64
DRG: 565 | End: 2024-06-06
Attending: INTERNAL MEDICINE
Payer: COMMERCIAL

## 2024-06-06 ENCOUNTER — APPOINTMENT (OUTPATIENT)
Dept: PHYSICAL THERAPY | Facility: SKILLED NURSING FACILITY | Age: 64
DRG: 565 | End: 2024-06-06
Attending: INTERNAL MEDICINE
Payer: COMMERCIAL

## 2024-06-06 LAB
ANION GAP SERPL CALCULATED.3IONS-SCNC: 10 MMOL/L (ref 7–15)
BUN SERPL-MCNC: 52.2 MG/DL (ref 8–23)
CALCIUM SERPL-MCNC: 8 MG/DL (ref 8.8–10.2)
CHLORIDE SERPL-SCNC: 102 MMOL/L (ref 98–107)
CREAT SERPL-MCNC: 1.8 MG/DL (ref 0.51–0.95)
DEPRECATED HCO3 PLAS-SCNC: 20 MMOL/L (ref 22–29)
EGFRCR SERPLBLD CKD-EPI 2021: 31 ML/MIN/1.73M2
ERYTHROCYTE [DISTWIDTH] IN BLOOD BY AUTOMATED COUNT: 15.2 % (ref 10–15)
GLUCOSE SERPL-MCNC: 74 MG/DL (ref 70–99)
HCT VFR BLD AUTO: 27.6 % (ref 35–47)
HGB BLD-MCNC: 9.1 G/DL (ref 11.7–15.7)
HOLD SPECIMEN: NORMAL
MCH RBC QN AUTO: 31.5 PG (ref 26.5–33)
MCHC RBC AUTO-ENTMCNC: 33 G/DL (ref 31.5–36.5)
MCV RBC AUTO: 96 FL (ref 78–100)
PLATELET # BLD AUTO: 272 10E3/UL (ref 150–450)
POTASSIUM SERPL-SCNC: 4.6 MMOL/L (ref 3.4–5.3)
RBC # BLD AUTO: 2.89 10E6/UL (ref 3.8–5.2)
SODIUM SERPL-SCNC: 132 MMOL/L (ref 135–145)
WBC # BLD AUTO: 4.7 10E3/UL (ref 4–11)

## 2024-06-06 PROCEDURE — 250N000011 HC RX IP 250 OP 636: Performed by: PHYSICIAN ASSISTANT

## 2024-06-06 PROCEDURE — 97110 THERAPEUTIC EXERCISES: CPT | Mod: GP | Performed by: PHYSICAL THERAPIST

## 2024-06-06 PROCEDURE — 97530 THERAPEUTIC ACTIVITIES: CPT | Mod: GP

## 2024-06-06 PROCEDURE — 97110 THERAPEUTIC EXERCISES: CPT | Mod: GP

## 2024-06-06 PROCEDURE — 80048 BASIC METABOLIC PNL TOTAL CA: CPT | Performed by: PHYSICIAN ASSISTANT

## 2024-06-06 PROCEDURE — 120N000009 HC R&B SNF

## 2024-06-06 PROCEDURE — 97530 THERAPEUTIC ACTIVITIES: CPT | Mod: GP | Performed by: PHYSICAL THERAPIST

## 2024-06-06 PROCEDURE — 250N000013 HC RX MED GY IP 250 OP 250 PS 637: Performed by: PHYSICIAN ASSISTANT

## 2024-06-06 PROCEDURE — 85014 HEMATOCRIT: CPT | Performed by: PHYSICIAN ASSISTANT

## 2024-06-06 PROCEDURE — 36415 COLL VENOUS BLD VENIPUNCTURE: CPT | Performed by: PHYSICIAN ASSISTANT

## 2024-06-06 PROCEDURE — 97535 SELF CARE MNGMENT TRAINING: CPT | Mod: GO | Performed by: OCCUPATIONAL THERAPIST

## 2024-06-06 RX ORDER — HYDRALAZINE HYDROCHLORIDE 10 MG/1
10 TABLET, FILM COATED ORAL 4 TIMES DAILY
Status: DISCONTINUED | OUTPATIENT
Start: 2024-06-06 | End: 2024-06-08 | Stop reason: HOSPADM

## 2024-06-06 RX ADMIN — HYDRALAZINE HYDROCHLORIDE 10 MG: 10 TABLET, FILM COATED ORAL at 17:24

## 2024-06-06 RX ADMIN — ACETAMINOPHEN 1000 MG: 325 TABLET, FILM COATED ORAL at 16:28

## 2024-06-06 RX ADMIN — SEVELAMER HYDROCHLORIDE 400 MG: 400 TABLET, FILM COATED ORAL at 17:25

## 2024-06-06 RX ADMIN — SEVELAMER HYDROCHLORIDE 400 MG: 400 TABLET, FILM COATED ORAL at 10:09

## 2024-06-06 RX ADMIN — ROSUVASTATIN CALCIUM 10 MG: 5 TABLET, FILM COATED ORAL at 21:48

## 2024-06-06 RX ADMIN — HYDRALAZINE HYDROCHLORIDE 10 MG: 10 TABLET, FILM COATED ORAL at 10:08

## 2024-06-06 RX ADMIN — HYDRALAZINE HYDROCHLORIDE 10 MG: 10 TABLET, FILM COATED ORAL at 13:15

## 2024-06-06 RX ADMIN — MICONAZOLE NITRATE: 20 POWDER TOPICAL at 21:51

## 2024-06-06 RX ADMIN — Medication 1 CAPSULE: at 21:48

## 2024-06-06 RX ADMIN — HEPARIN SODIUM 5000 UNITS: 5000 INJECTION, SOLUTION INTRAVENOUS; SUBCUTANEOUS at 21:51

## 2024-06-06 RX ADMIN — CETIRIZINE HYDROCHLORIDE 5 MG: 5 TABLET ORAL at 10:09

## 2024-06-06 RX ADMIN — CLOPIDOGREL BISULFATE 75 MG: 75 TABLET ORAL at 10:08

## 2024-06-06 RX ADMIN — Medication 1 CAPSULE: at 13:15

## 2024-06-06 RX ADMIN — AMLODIPINE BESYLATE 10 MG: 10 TABLET ORAL at 10:09

## 2024-06-06 RX ADMIN — NICOTINE 1 PATCH: 14 PATCH, EXTENDED RELEASE TRANSDERMAL at 10:14

## 2024-06-06 RX ADMIN — Medication 60 ML: at 10:08

## 2024-06-06 RX ADMIN — SEVELAMER HYDROCHLORIDE 400 MG: 400 TABLET, FILM COATED ORAL at 13:15

## 2024-06-06 RX ADMIN — FLUTICASONE FUROATE AND VILANTEROL TRIFENATATE 1 PUFF: 200; 25 POWDER RESPIRATORY (INHALATION) at 10:08

## 2024-06-06 RX ADMIN — BUMETANIDE 2 MG: 2 TABLET ORAL at 10:09

## 2024-06-06 RX ADMIN — HYDRALAZINE HYDROCHLORIDE 10 MG: 10 TABLET, FILM COATED ORAL at 21:48

## 2024-06-06 RX ADMIN — Medication 1 CAPSULE: at 10:09

## 2024-06-06 RX ADMIN — HEPARIN SODIUM 5000 UNITS: 5000 INJECTION, SOLUTION INTRAVENOUS; SUBCUTANEOUS at 06:32

## 2024-06-06 RX ADMIN — OXYCODONE HYDROCHLORIDE 5 MG: 5 TABLET ORAL at 21:51

## 2024-06-06 RX ADMIN — HEPARIN SODIUM 5000 UNITS: 5000 INJECTION, SOLUTION INTRAVENOUS; SUBCUTANEOUS at 13:16

## 2024-06-06 RX ADMIN — GABAPENTIN 200 MG: 100 CAPSULE ORAL at 21:48

## 2024-06-06 ASSESSMENT — ACTIVITIES OF DAILY LIVING (ADL)
ADLS_ACUITY_SCORE: 55
ADLS_ACUITY_SCORE: 54
ADLS_ACUITY_SCORE: 56
ADLS_ACUITY_SCORE: 55
ADLS_ACUITY_SCORE: 54
ADLS_ACUITY_SCORE: 55
ADLS_ACUITY_SCORE: 56
ADLS_ACUITY_SCORE: 56
ADLS_ACUITY_SCORE: 55
ADLS_ACUITY_SCORE: 54
ADLS_ACUITY_SCORE: 55
ADLS_ACUITY_SCORE: 56
ADLS_ACUITY_SCORE: 56
ADLS_ACUITY_SCORE: 55
ADLS_ACUITY_SCORE: 56
ADLS_ACUITY_SCORE: 55
ADLS_ACUITY_SCORE: 55
ADLS_ACUITY_SCORE: 56
ADLS_ACUITY_SCORE: 55
ADLS_ACUITY_SCORE: 56
ADLS_ACUITY_SCORE: 54
ADLS_ACUITY_SCORE: 55
ADLS_ACUITY_SCORE: 55

## 2024-06-06 NOTE — PROGRESS NOTES
Brief Medicine Note:    IM following routine labs which show stable CBC and BMP with improving Cr (2.45 --> 1.94 --> 1.8) and stable to improving Na (132 --> 131 --> 132). BP persistently elevated in the 150s-160s/60s-70s  - BMP 6/7 given discharge plans for 6/8  - Continue cares per 6/3 progress note  - Contact medicine with changes or concerns  - Start Hydralazine with hold parameters (No ACE-I given LIMA, HR too low to increase Coreg)    BOSSMAN FelixC  Internal Medicine [facility]

## 2024-06-06 NOTE — PLAN OF CARE
Goal Outcome Evaluation:  VS: BP (!) 167/65 (BP Location: Left arm)   Pulse 58   Temp 97.9  F (36.6  C) (Oral)   Resp 17   Wt 60.3 kg (132 lb 15 oz)   LMP  (LMP Unknown)   SpO2 94%   BMI 25.13 kg/m     O2: Stable on RA   Output: Continent B/B with brief on, purewick in   Last BM: 6/6/2024   Activity: A2 sliding board   Skin: R chest scab, bruises scattered on both arms and abdomen, old dialysis port site scabbing, L arm skin tear scabbing, scattered bruising on both arms, sacrum wound   Pain: Headache and requested PRN Tylenol due to headache; passed onto evening nurse.   CMS: AOx4   Dressing: Sacrum mepilex CDI, wound vac to R amputee leg 25cc and CDI   Diet: Gluten free diet, 2g K+; Meds whole with thin liquids   LDA: None   Equipment: Wound vac   Plan: Will continue to follow POC.   Additional Info: Pt able to make needs known. Denies CP, SOB, and N/V. Wound vac drainage about 25cc this shift. Fluid restriction 1500mL. Nicotine patch on R arm intact. No other acute issues this shift. Call light within reach.

## 2024-06-06 NOTE — PLAN OF CARE
Goal Outcome Evaluation:      Plan of Care Reviewed With: patient    Overall Patient Progress: no changeOverall Patient Progress: no change    FOCUS/GOAL     Bowel management, Bladder management, Medication management, Wound care management, Medical management, Mobility, Skin integrity, and Safety management     ASSESSMENT, INTERVENTIONS AND CONTINUING PLAN FOR GOAL:     Pt is A&OX4, calm, & cooperative with care. Denied CP, SOB, & n/v. Pt transfers A of 2 with sliding board; was not OOB this shift. Continent for both B&B. Takes meds whole with thin liquid. Wound vac to the R stump in place, intact, & running at 125 mmHg. Sacral dressing CDI. Pt slept well for most of the shift & no other acute concern. Able to make needs known & call light within reach. Continue with POC.

## 2024-06-06 NOTE — PLAN OF CARE
Care Coordination:    Admitting diagnosis: R AKA with wound infection; Amputation stump infection (H)    Date of admission: 5/29/2024      Discharge Date: 06/08/2024  Discharge Location: Home  Transportation: Family provides transportation    PCP:  Vasuqez Benoit  [address]  / [facility] [address]  [phone]    Homecare:  [facility]   Phone: [phone]   Fax: [phone]  -Nursing, Physical Therapy and occupational therapy     Lines/Drains/Skin:  Old dialysis site to left chest  Left arm skin tear, covered in mepilex    Wound Lines & Drains  Wound vac to right AKA  Wound vac ordered from [employer] [phone]. Called 6/6 Zunilda and left message. Also, per Sierra, she will message team so that someone can call me back sooner.   Emailed Rogelio JIMENEZ 6/6 for delivery status       Equipment Needs:  Wheelchair - this will be delivered to the unit on 6/7. Please connect with therapy for questions on status.   Patient has ordered the following from [employer]: commode, shower bench, sliding board.     Medications:  Patient would like medications to be sent to [facility] in [place]  [id number] [place] [address]       Follow up appointments:  PCP  Nephrology   Psych
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
## 2024-06-06 NOTE — TELEPHONE ENCOUNTER
Writer called rehab back, explained that we don't have anything in a timely manner to work this patient in. Offered [facility] as an option. She verbalized understanding.
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
## 2024-06-06 NOTE — PLAN OF CARE
Goal Outcome Evaluation:      Plan of Care Reviewed With: patient    Overall Patient Progress: improvingOverall Patient Progress: improving    [id number]    FOCUS/GOAL     Bowel management, Bladder management, Medication management, Wound care management, Medical management, Mobility, Skin integrity, and Safety management     ASSESSMENT, INTERVENTIONS AND CONTINUING PLAN FOR GOAL:     Pt is A&OX4, calm, & cooperative with care. Denied CP, SOB, & n/v. VSS & on RA. Pt transfers A of 2 with sliding board; was not OOB this shift. Continent for both B&B. Takes meds whole with thin liquid. Managed R stump pain with PRN oxycodone. Wound van in place to the R stump intact running at 125 mmHg. Sacral dressing CDI. Pt ate dinner with good appetite & no other acute concern. Able to make needs known & call light within reach. Continue with plan of care.     Patient's most recent vital signs are:     Vital signs:  BP: 160/73  Temp: 98.5  HR: 65  RR: 18  SpO2: 94 %     Patient does not have new respiratory symptoms.  Patient does not have new sore throat.  Patient does not have a fever greater than 99.5.

## 2024-06-06 NOTE — PROGRESS NOTES
Weekly Round Updates (Team meeting/patient not present):    Medical: no medical concerns    Nursing: addressing old dialysis site issue and L arm skin tear    Therapy: last day tomorrow, home care set up, PT, OT, nursing     Care Coordination: [employer]         Care Conference (Team meeting with patient):    Discharge environment/plan: home with assist from sister      Barriers to discharge: wound vac needs to be changed to smaller unit for home      Time Frame for discharge: 6/8/2024      Equipment/Caregiver Training Needed: wheelchair to be delivered 6/7, pt having ordered all other necessary equipment      Transportation Plan: family planning to transport patient in her own car
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
## 2024-06-07 ENCOUNTER — APPOINTMENT (OUTPATIENT)
Dept: OCCUPATIONAL THERAPY | Facility: SKILLED NURSING FACILITY | Age: 64
DRG: 565 | End: 2024-06-07
Attending: INTERNAL MEDICINE
Payer: COMMERCIAL

## 2024-06-07 LAB
ANION GAP SERPL CALCULATED.3IONS-SCNC: 12 MMOL/L (ref 7–15)
BUN SERPL-MCNC: 50.1 MG/DL (ref 8–23)
CALCIUM SERPL-MCNC: 8.1 MG/DL (ref 8.8–10.2)
CHLORIDE SERPL-SCNC: 99 MMOL/L (ref 98–107)
CREAT SERPL-MCNC: 1.71 MG/DL (ref 0.51–0.95)
DEPRECATED HCO3 PLAS-SCNC: 21 MMOL/L (ref 22–29)
EGFRCR SERPLBLD CKD-EPI 2021: 33 ML/MIN/1.73M2
GLUCOSE SERPL-MCNC: 84 MG/DL (ref 70–99)
POTASSIUM SERPL-SCNC: 4.8 MMOL/L (ref 3.4–5.3)
SODIUM SERPL-SCNC: 132 MMOL/L (ref 135–145)

## 2024-06-07 PROCEDURE — 120N000009 HC R&B SNF

## 2024-06-07 PROCEDURE — 250N000013 HC RX MED GY IP 250 OP 250 PS 637: Performed by: PHYSICIAN ASSISTANT

## 2024-06-07 PROCEDURE — 36415 COLL VENOUS BLD VENIPUNCTURE: CPT | Performed by: PHYSICIAN ASSISTANT

## 2024-06-07 PROCEDURE — 250N000011 HC RX IP 250 OP 636: Performed by: PHYSICIAN ASSISTANT

## 2024-06-07 PROCEDURE — 97535 SELF CARE MNGMENT TRAINING: CPT | Mod: GO

## 2024-06-07 PROCEDURE — 84295 ASSAY OF SERUM SODIUM: CPT | Performed by: PHYSICIAN ASSISTANT

## 2024-06-07 PROCEDURE — 97606 NEG PRS WND THER DME>50 SQCM: CPT

## 2024-06-07 PROCEDURE — 99316 NF DSCHRG MGMT 30 MIN+: CPT | Performed by: PHYSICIAN ASSISTANT

## 2024-06-07 RX ORDER — SEVELAMER HYDROCHLORIDE 400 MG/1
400 TABLET, FILM COATED ORAL
Qty: 120 TABLET | Refills: 0 | Status: SHIPPED | OUTPATIENT
Start: 2024-06-07 | End: 2024-06-07

## 2024-06-07 RX ORDER — FLUTICASONE FUROATE AND VILANTEROL 200; 25 UG/1; UG/1
1 POWDER RESPIRATORY (INHALATION) DAILY
Qty: 1 EACH | Refills: 0 | Status: SHIPPED | OUTPATIENT
Start: 2024-06-07 | End: 2024-06-21

## 2024-06-07 RX ORDER — NICOTINE 21 MG/24HR
1 PATCH, TRANSDERMAL 24 HOURS TRANSDERMAL DAILY
Qty: 30 PATCH | Refills: 0 | Status: SHIPPED | OUTPATIENT
Start: 2024-06-07

## 2024-06-07 RX ORDER — BUMETANIDE 2 MG/1
2 TABLET ORAL DAILY
Qty: 30 TABLET | Refills: 0 | Status: ON HOLD | OUTPATIENT
Start: 2024-06-07 | End: 2024-07-08

## 2024-06-07 RX ORDER — HYDRALAZINE HYDROCHLORIDE 10 MG/1
10 TABLET, FILM COATED ORAL 4 TIMES DAILY
Qty: 120 TABLET | Refills: 0 | Status: ON HOLD | OUTPATIENT
Start: 2024-06-07 | End: 2024-07-08

## 2024-06-07 RX ORDER — POLYETHYLENE GLYCOL 3350 17 G/17G
17 POWDER, FOR SOLUTION ORAL DAILY
Qty: 510 G | Refills: 0 | Status: SHIPPED | OUTPATIENT
Start: 2024-06-07

## 2024-06-07 RX ORDER — AMLODIPINE BESYLATE 10 MG/1
10 TABLET ORAL DAILY
Qty: 30 TABLET | Refills: 0 | Status: SHIPPED | OUTPATIENT
Start: 2024-06-07 | End: 2024-07-29

## 2024-06-07 RX ORDER — SACCHAROMYCES BOULARDII 250 MG
250 CAPSULE ORAL 2 TIMES DAILY
Qty: 60 CAPSULE | Refills: 0 | Status: SHIPPED | OUTPATIENT
Start: 2024-06-07

## 2024-06-07 RX ORDER — ROSUVASTATIN CALCIUM 10 MG/1
10 TABLET, COATED ORAL AT BEDTIME
Qty: 30 TABLET | Refills: 0 | Status: SHIPPED | OUTPATIENT
Start: 2024-06-07 | End: 2024-08-09

## 2024-06-07 RX ORDER — ACETAMINOPHEN 500 MG
500-1000 TABLET ORAL EVERY 6 HOURS PRN
Qty: 60 TABLET | Refills: 0 | Status: SHIPPED | OUTPATIENT
Start: 2024-06-07

## 2024-06-07 RX ORDER — DIPHENOXYLATE HCL/ATROPINE 2.5-.025MG
1 TABLET ORAL 4 TIMES DAILY PRN
Qty: 30 TABLET | Refills: 0 | Status: SHIPPED | OUTPATIENT
Start: 2024-06-07

## 2024-06-07 RX ORDER — GABAPENTIN 100 MG/1
200 CAPSULE ORAL AT BEDTIME
Qty: 60 CAPSULE | Refills: 0 | Status: SHIPPED | OUTPATIENT
Start: 2024-06-07 | End: 2024-07-29

## 2024-06-07 RX ORDER — CETIRIZINE HYDROCHLORIDE 5 MG/1
5 TABLET ORAL DAILY
Qty: 30 TABLET | Refills: 0 | Status: SHIPPED | OUTPATIENT
Start: 2024-06-07

## 2024-06-07 RX ORDER — CARVEDILOL 12.5 MG/1
12.5 TABLET ORAL 2 TIMES DAILY WITH MEALS
Qty: 60 TABLET | Refills: 0 | Status: SHIPPED | OUTPATIENT
Start: 2024-06-07 | End: 2024-07-29

## 2024-06-07 RX ORDER — CLOPIDOGREL BISULFATE 75 MG/1
75 TABLET ORAL DAILY
Qty: 30 TABLET | Refills: 0 | Status: SHIPPED | OUTPATIENT
Start: 2024-06-07 | End: 2024-08-29

## 2024-06-07 RX ORDER — B COMPLEX, C NO.20/FOLIC ACID 1 MG
1 CAPSULE ORAL DAILY
Qty: 30 CAPSULE | Refills: 0 | Status: SHIPPED | OUTPATIENT
Start: 2024-06-07 | End: 2024-06-07

## 2024-06-07 RX ORDER — FAMOTIDINE 20 MG/1
20 TABLET, FILM COATED ORAL
Qty: 30 TABLET | Refills: 0 | Status: SHIPPED | OUTPATIENT
Start: 2024-06-07 | End: 2024-06-21

## 2024-06-07 RX ORDER — OXYCODONE HYDROCHLORIDE 5 MG/1
5 TABLET ORAL 2 TIMES DAILY PRN
Qty: 6 TABLET | Refills: 0 | Status: SHIPPED | OUTPATIENT
Start: 2024-06-07 | End: 2024-06-13

## 2024-06-07 RX ADMIN — OXYCODONE HYDROCHLORIDE 5 MG: 5 TABLET ORAL at 21:16

## 2024-06-07 RX ADMIN — HYDRALAZINE HYDROCHLORIDE 10 MG: 10 TABLET, FILM COATED ORAL at 09:08

## 2024-06-07 RX ADMIN — HYDRALAZINE HYDROCHLORIDE 10 MG: 10 TABLET, FILM COATED ORAL at 16:05

## 2024-06-07 RX ADMIN — SEVELAMER HYDROCHLORIDE 400 MG: 400 TABLET, FILM COATED ORAL at 09:08

## 2024-06-07 RX ADMIN — CARVEDILOL 12.5 MG: 12.5 TABLET, FILM COATED ORAL at 17:21

## 2024-06-07 RX ADMIN — HEPARIN SODIUM 5000 UNITS: 5000 INJECTION, SOLUTION INTRAVENOUS; SUBCUTANEOUS at 06:14

## 2024-06-07 RX ADMIN — SEVELAMER HYDROCHLORIDE 400 MG: 400 TABLET, FILM COATED ORAL at 13:06

## 2024-06-07 RX ADMIN — ROSUVASTATIN CALCIUM 10 MG: 5 TABLET, FILM COATED ORAL at 21:14

## 2024-06-07 RX ADMIN — FAMOTIDINE 20 MG: 20 TABLET ORAL at 09:08

## 2024-06-07 RX ADMIN — NICOTINE 1 PATCH: 14 PATCH, EXTENDED RELEASE TRANSDERMAL at 09:07

## 2024-06-07 RX ADMIN — CARVEDILOL 12.5 MG: 12.5 TABLET, FILM COATED ORAL at 09:08

## 2024-06-07 RX ADMIN — HYDRALAZINE HYDROCHLORIDE 10 MG: 10 TABLET, FILM COATED ORAL at 21:13

## 2024-06-07 RX ADMIN — CLOPIDOGREL BISULFATE 75 MG: 75 TABLET ORAL at 09:08

## 2024-06-07 RX ADMIN — CETIRIZINE HYDROCHLORIDE 5 MG: 5 TABLET ORAL at 09:08

## 2024-06-07 RX ADMIN — HYDRALAZINE HYDROCHLORIDE 10 MG: 10 TABLET, FILM COATED ORAL at 13:06

## 2024-06-07 RX ADMIN — BUMETANIDE 2 MG: 2 TABLET ORAL at 09:08

## 2024-06-07 RX ADMIN — Medication 1 CAPSULE: at 21:13

## 2024-06-07 RX ADMIN — HEPARIN SODIUM 5000 UNITS: 5000 INJECTION, SOLUTION INTRAVENOUS; SUBCUTANEOUS at 13:06

## 2024-06-07 RX ADMIN — Medication 60 ML: at 09:09

## 2024-06-07 RX ADMIN — GABAPENTIN 200 MG: 100 CAPSULE ORAL at 21:14

## 2024-06-07 RX ADMIN — AMLODIPINE BESYLATE 10 MG: 10 TABLET ORAL at 09:08

## 2024-06-07 RX ADMIN — MICONAZOLE NITRATE: 20 POWDER TOPICAL at 21:12

## 2024-06-07 RX ADMIN — HEPARIN SODIUM 5000 UNITS: 5000 INJECTION, SOLUTION INTRAVENOUS; SUBCUTANEOUS at 21:14

## 2024-06-07 RX ADMIN — OXYCODONE HYDROCHLORIDE 5 MG: 5 TABLET ORAL at 09:50

## 2024-06-07 RX ADMIN — Medication 1 CAPSULE: at 09:08

## 2024-06-07 RX ADMIN — Medication 1 CAPSULE: at 13:06

## 2024-06-07 RX ADMIN — SEVELAMER HYDROCHLORIDE 400 MG: 400 TABLET, FILM COATED ORAL at 17:21

## 2024-06-07 RX ADMIN — FLUTICASONE FUROATE AND VILANTEROL TRIFENATATE 1 PUFF: 200; 25 POWDER RESPIRATORY (INHALATION) at 09:07

## 2024-06-07 ASSESSMENT — ACTIVITIES OF DAILY LIVING (ADL)
ADLS_ACUITY_SCORE: 55
ADLS_ACUITY_SCORE: 56
ADLS_ACUITY_SCORE: 55
ADLS_ACUITY_SCORE: 55
ADLS_ACUITY_SCORE: 56
ADLS_ACUITY_SCORE: 55
ADLS_ACUITY_SCORE: 56
ADLS_ACUITY_SCORE: 56
ADLS_ACUITY_SCORE: 55
ADLS_ACUITY_SCORE: 55
ADLS_ACUITY_SCORE: 56
ADLS_ACUITY_SCORE: 55
ADLS_ACUITY_SCORE: 56
ADLS_ACUITY_SCORE: 56
ADLS_ACUITY_SCORE: 55
ADLS_ACUITY_SCORE: 56

## 2024-06-07 NOTE — PLAN OF CARE
Pt is alert and oriented x4. Able to make needs know to staff. Continent of both bowel and bladder (Pure wick). Assist of two with sliding board. Scattered bruises on both arms. PRN tylenol and oxy give per pt request. Sacrum mepliex CDI. Wound vac to right stump. Take medication whole with thin liquid. On a gluten free and 2g K diet. Fluid restriction of 1500 ml. Pt denies SOB, chest pain and N/V. Will continue plan of care.        Patient's most recent vital signs are:     Vital signs:  BP: 158/66  Temp: 98.7  HR: 59  RR: 18  SpO2: 95 %     Patient does not have new respiratory symptoms.  Patient does not have new sore throat.  Patient does not have a fever greater than 99.5.

## 2024-06-07 NOTE — PLAN OF CARE
Occupational Therapy Discharge Summary    Reason for therapy discharge:    Discharged to home.    Progress towards therapy goal(s). See goals on Care Plan in [facility] electronic health record for goal details.  Goals met    Therapy recommendation(s):    Continued therapy is recommended.  Rationale/Recommendations:  recommend home health OT to further progress ADL and IADL independence. DME ordered: shower bench, commode, slide board. Wheelchair delivered 6/7.     Status at discharge:  ADLs:  Mobility: SBA slide board transfers  Grooming: Set up seated in w/c  Dressing: UB IND, LB set up assist, footwear set up assist  Bathing: Min A for washing/drying L foot; SBA slide board transfer from w/c to shower bench  Toileting: max A for clothing management and lydia hygiene  IADLs: Needs assist

## 2024-06-07 NOTE — PROGRESS NOTES
[facility]  [facility] Nurse Inpatient Assessment     Consulted for: New consult for Right AKA/thigh    Summary: Right thigh wound vac changed 5/24. [employer] has signed off on coccyx. Dressing changes to be performed on Tu/F the week of Memorial Day due to the holiday, then will resume [facility] dressing changes.     Patient History (according to provider note(s):      Shirley Hendricks is a 63 year old woman with a history of Charcot-Breonna Tooth disease, severe PAD, SLE, DM II, HTN, HLD, CAD, GERD, SVT, possible celiac disease, prior B cell lymphoma (outpatient surveillance), asthma/COPD, and depression/anxiety who was admitted to [facility] 3/29-4/22/24 with right limb ischemia in setting of severe PAD ultimately requiring right AKA with complicated post op course (including LIMA requiring HD) after which she was discharged to  ARU. She was readmitted to [facility] 5/15-5/29/24 with stump eschars requiring surgical debridement and wound VAC placement. Her dialysis catheter has been removed. Now transferred to  TCU for ongoing rehabilitation and wound cares.     Assessment:      Areas visualized during today's visit: Focused:R AKA/Thigh    Negative pressure wound therapy applied to: Right AKA and Right Thigh  Right anterior leg    Right posterior leg          5/31 (additional pictures available in media tab)    6/5 6/5    Wound due to: Surgical Wound   Wound history/plan of care:    Surgical date: 5/20/24   Service following: Vascular  Date Negative Pressure Wound Therapy initiated: 5/20/24   Interventions in place: repositioning, pressure redistribution with device or dressing, specialty surface in use, moisture/incontinence management, offloading, and elevation  Is patient s nutritional status compromised? NO, but reports little appetite  If yes, what interventions are in place? Protein supplements  Reason for initiating vac therapy? Need for accelerated granulation  tissue  Which?of?the?following?co-morbidities?apply? Immobility and PAD  If diabetic is patient on a diabetic management program? N/A   Is osteomyelitis present in wound? no   If yes what treatments are in place? N/A      Wound base: right thigh: Mix of granuar tissue, muscle, slough. , Right medial: 80 % granulation tissue and muscle 20% slough     Palpation of the wound bed: normal       Drainage: small      Volume in cannister: <50ml     Last cannister change date: 5/29/24     Description of drainage: serosanguinous and bloody      Measurements (length x width x depth, in cm) Right Thigh: 14 x 7 x 0.5cm Right medial AKA: 1.7 (positional) x 2.5 x 1cm       Tunneling N/A      Undermining resolved 5/31  Periwound skin: Edematous and Erythema- blanchable       Color: pink       Temperature: normal    Odor: none   Pain: moderate, during tape removal aching, sharp, and right-sided   Pain intervention prior to dressing change: oral oxycodone, soaking, and slow and gentle cares   Treatment goal: Heal , Drainage control, Infection control/prevention, Increase granulation, Maintain (prevention of deterioration), Pain control, Protection, and Promote epidermal migration  STATUS: improving   Supplies ordered: gathered, supplies stored on unit, and discussed with patient     Number of foam pieces removed from a wound (excluding foam for bridge) :  2 GranuFoam Black   Verified this matched the number of foam pieces applied last dressing change: Yes   Number of foam pieces packed into wound (excluding foam for bridge) :  2 GranuFoam Black            Treatment Plan:     Negative pressure wound therapy plan:  Wound location: Right thigh and right AKA   Change Days: Tu/F the week of 5/28 then  Mon/Wed/Fri by WOC RN    Supplies (including all accessories) used: large Black foam , Adaptic/Curity oil emulsion contact layer , and Cavilon no sting barrier film  Cleanse with MicroKlenz prior to replacing NPWT  Suction setting: -125  "  Methods used: Window paned all periwound skin with vac drape prior to applying sponge, Bridged trac pad off bony prominences, Spiral cut foam prior to packing into wound , and Cut foam in half to reduce profile    Staff RN to assess integrity of dressing and ensure suction is set at appropriate level every shift.   Date canister. Chart canister output every shift. Change cannister weekly and PRN if full/occluded     Remove foam dressing and replace with BID normal saline moist gauze dressing if:   -a dressing failure which cannot be repaired within 2 hours   -patient is discharging to home without a home pump   -patient is discharging to a facility outside the local area   -if a dressing is a \"Silver Foam\", remove before Radiation Therapy or MRI     The hospital VAC pump is not to be discharged with the patient.?Ensure to disconnect patient from machine prior to discharge. Then,    - If a home KCI VAC pump has been delivered, connect home cannister to dressing tubing then connect cannister to home pump and turn on machine    - If transferring to a nearby facility with a KCI vac, can disconnect and clamp tubing then cover end with glove so can be reconnected within 2 hours       Coccyx/Sacrum - newly healed area: Apply Sacral Mepilex every other day and prn if soiled or damaged     Pressure Injury Prevention (PIP) Plan:  If patient is declining pressure injury prevention interventions: Explore reason why and address patient's concerns, Educate on pressure injury risk and prevention intervention(s), If patient is still declining, document \"informed refusal\" , and Ensure Care team is aware ( provider, charge nurse, etc)  Mattress: Follow bed algorithm, reassess daily and order specialty mattress, if indicated.  HOB: Maintain at or below 30 degrees, unless contraindicated  Repositioning in bed: Every 1-2 hours , Left/right positioning; avoid supine, and Raise foot of bed prior to raising head of bed, to reduce patient " sliding down (shear)  Heels: Keep elevated off mattress and Pillows under calves  Protective Dressing: Sacral Mepilex for prevention (#552137),  especially for the agitated patient   Positioning Equipment:None  Chair positioning: Repositions self: patient to shift weight every 15 minutes, Assist patient to reposition hourly, and Do NOT use a donut for sitting (this increases pressure to smaller area and creates a higher potential for injury)    If patient has a buttock pressure injury, or high risk for PI use chair cushion or SPS.  Moisture Management: Perineal cleansing /protection: Follow Incontinence Protocol, Avoid brief in bed, Clean and dry skin folds with bathing , and Moisturize dry skin  Under Devices: Inspect skin under all medical devices during skin inspection , Ensure tubes are stabilized without tension, and Ensure patient is not lying on medical devices or equipment when repositioned  Ask provider to discontinue device when no longer needed.     Orders: Reviewed, Written, and Updated    RECOMMEND PRIMARY TEAM ORDER: None, at this time  Education provided: importance of repositioning, plan of care, wound progress, and Off-loading pressure  Discussed plan of care with: Patient and Nurse  WOC nurse follow-up plan: Monday/WednesdayFriday  Notify WOC if wound(s) deteriorate.  Nursing to notify the Provider(s) and re-consult the WOC Nurse if new skin concern.    DATA:     Current support surface: Standard  Standard Isoflex gel  Containment of urine/stool: Incontinent pad in bed  BMI: Body mass index is 25.13 kg/m .   Active diet order: Orders Placed This Encounter      Combination Diet Gluten Free Diet; 2 gm K Diet      Diet     Output: I/O last 3 completed shifts:  In: -   Out: 700 [Urine:700]     Labs:   Recent Labs   Lab 06/06/24  0631   HGB 9.1*   WBC 4.7     Pressure injury risk assessment:   Sensory Perception: 3-->slightly limited  Moisture: 3-->occasionally moist  Activity: 2-->chairfast  Mobility:  2-->very limited  Nutrition: 3-->adequate  Friction and Shear: 2-->potential problem  Cesar Score: 15    Becky Yadav RN CWOCN  Dept. Vocera- Contact [facility] Nurse [facility]  Dept. Office Number: *[phone]

## 2024-06-07 NOTE — PLAN OF CARE
Goal Outcome Evaluation:      Plan of Care Reviewed With: patient    Overall Patient Progress: no change      Pt. AOx4. Able to make needs known. With Right AKA stump with wound vacc, CDI, at -125mmHG. Continent with bowel and bladder, uses purewick at night, last BM: 6/6. Appear to be resting and sleeping during rounds. No acute issues during the shift. Continue POC.               Patient's most recent vital signs are:     Vital signs:  BP: 158/66  Temp: 98.7  HR: 59  RR: 18  SpO2: 95 %     Patient does not have new respiratory symptoms.  Patient does not have new sore throat.  Patient does not have a fever greater than 99.5.

## 2024-06-07 NOTE — PROGRESS NOTES
Discharge Plan     Discharge Date: 6/8/24  Discharge Disposition:  Home .     Discharge Services: [facility]  Home PT/OT/RN      ORDERS AND DISCHARGE SUMMARY WILL NEED TO BE  pulled off EPIC     Discharge Supplies: All DME supplied by PT/OT     Discharge Transportation:  DAWN Simon   [facility], Transitional Care Unit   Social Work   [address] [place] [address], [address]  [place], [place] [address] (ph) [phone]

## 2024-06-07 NOTE — PLAN OF CARE
Goal Outcome Evaluation:  VS: BP (!) 155/64 (BP Location: Left arm)   Pulse 66   Temp 97.9  F (36.6  C) (Oral)   Resp 18   Wt 60.3 kg (132 lb 15 oz)   LMP  (LMP Unknown)   SpO2 96%   BMI 25.13 kg/m     O2: Stable on RA   Output: Continent B/B with brief on and purewick   Last BM: 6/6/2024   Activity: A2 sliding board   Skin: R chest scab, bruises scattered on both arms and abdomen, old dialysis port site scabbing, L arm skin tear scabbing, sacrum wound, wound vac to R amputee leg CDI   Pain: PRN oxycodone x1 as requested for pain prior to therapy session   CMS: AOx4   Dressing: Sacrum wound mepilex CDI, wound vac to R amputee leg CDI   Diet: Gluten free diet, 2g K+; Meds whole with thin liquids   LDA: None   Equipment: Wound vac   Plan: Will continue to follow POC.   Additional Info: Pt able to make needs known. Denies CP, SOB, and N/V. Wound vac drainage was approximately around 25cc this shift. Fluid restriction 1500mL. Nicotine patch on L arm intact. Pt plans to discharge 6/8. Pain assessment not done this shift due to pt request to nap and be left alone. Passed onto evening nurse. No acute issues this shift. Call light within reach.

## 2024-06-07 NOTE — PLAN OF CARE
Goal Outcome Evaluation:            VS: BP (!) 171/74   Pulse 63   Temp 98  F (36.7  C) (Oral)   Resp 18   Wt 60.3 kg (132 lb 15 oz)   LMP  (LMP Unknown)   SpO2 96%   BMI 25.13 kg/m      O2: Stable in RA.   Output: Continent B/B uses purewick and brief.   Last BM: 06/07/2024   Activity: A2 with sliding board.   Skin: R chest scab, bruises scattered on both arms and abdomen, old dialysis port site scabbing, L arm skin tear scabbing, sacrum wound, dryness noted in extremities, wound vac to R amputee leg CDI    Pain: Rated as 7-8/10. Oxycodone given once as PRN.   CMS: Alert and oriented x4. Able to make needs and concerns known. Had phone calls with relatives this evening.  Watches TV between cares.   Dressing: wound vac to R amputee leg, CDI, Mepilex on sacrum.   Diet: Gluten Free, renal diet 2gm K  FR 1500ml  Had good appetite for dinner.   LDA: None   Equipment: Wound vac, call light   Plan: Will continue POC.   Additional Info: Pt denies any chest pain, SOB, headache or N/V. Pt verbalized that she always has high BP. Encouraged to do monitoring of blood pressure at home. For discharge tomorrow 06/08/2024. Not OOB this shift. No acute issues this shift. Evening cares done Bed alarm on.         Patient's most recent vital signs are:     Vital signs:  BP: 174/72  Temp: 98  HR: 63  RR: 18  SpO2: 96 %     Patient does not have new respiratory symptoms.  Patient does not have new sore throat.  Patient does not have a fever greater than 99.5.

## 2024-06-07 NOTE — DISCHARGE SUMMARY
[facility] Transitional Care  Hospitalist Discharge Summary      Date of Admission: 5/29/2024  Date of Discharge: 6/8/2024  Discharging Provider: Opal Zurita PA-C  Discharge Service: Hospitalist Service    Discharge Diagnoses   Severe PAD with acute thrombosis of CFA-AT bypass and acute limb ischemia s/p R AKA 4/2024  Stump wound dehiscence and necrosis s/p I&D and wound VAC placement 5/16/2   Improving LIMA  Hyponatremia  HTN  Hyperphosphatemia  Possible celiac disease, loose stools  Asthma  COPD  DMII  GERD  Tobacco use disorder  HLD    Follow-ups Needed After Discharge   - Follow up with PCP 6/21/2024 as planned: needs CBC, BMP, Mg, phos within 1 week of discharge  - Follow up with vascular surgery 6/10 and 7/1 as planned  - Follow up with nephology at next available appointment     Unresulted Labs Ordered in the Past 30 Days of this Admission       No orders found from 4/29/2024 to 5/30/2024.            Discharge Disposition   Discharged to home  Condition at discharge: Stable    Hospital Course   Shirley Hendricks is a 63 year old female with history of Charcot-Breonna Tooth disease, severe PAD, SLE, DM II, HTN, HLD, CAD, GERD, SVT, possible celiac disease, prior B cell lymphoma (outpatient surveillance), asthma/COPD, and depression/anxiety who was admitted to [facility] 3/29-4/22/24 with right limb ischemia in setting of severe PAD ultimately requiring right AKA with complicated post op course (including LIMA requiring HD) after which she was discharged to  ARU. She was readmitted to [facility] 5/15-5/29/24 with stump eschars requiring surgical debridement and wound VAC placement. Her dialysis catheter has been removed. Transferred to  TCU for rehabilitation and wound cares.      Severe PAD with acute thrombosis of CFA-AT bypass and acute limb ischemia s/p R AKA 4/2024. Stump wound dehiscence and necrosis s/p I&D and wound VAC placement 5/16/24: AKA 4/1/24 with RTOR 4/9 at [facility]. Discharged  to ARU and then returned to hospital with stump eschars. S/p I&D and wound VAC placement 5/16 with VAC exchange in OR 5/20. Will eventually require split thickness skin graft. Wound was not felt to be infected - surgical culture grew staph capitis which the team felt had low pathogenicity and was unlikely to have cause wound dehiscence. Was not seen by ID. Remains afebrile, no leukocytosis  - Patient to continue with wound VAC on discharge and will see vascular surgery 6/10/2024 for ongoing care  - Pain control with PRN Tylenol and oxycodone (5 mg) 30 min prior to dressing changes. Continue nightly gabapentin  - Continue Plavix and statin     LIMA, improving: Initially occurred at  SD 2/2 rhabdomyolysis and ischemic injury. Required HD (last run 5/18, line since removed). I&Os were quite challenging given Purewick use, but Cr improved throughout TCU stay and was 1.71 (1.8) on last check 6/7   - Repeat labs within 1 week of discharge   - Continue a low potassium diet on discharge  - Continue bumex   - Needs OP nephrology follow up through  SD 2-3 weeks after discharge. Per chart review, next available is 1/2025 but team is working to get that pushed up     Hyponatremia: Mild and improving. Na 132 on 6/7. Trend BMP as above    Possible celiac disease, loose stools: Poorly compliant with gluten free diet. Incontinent of stool.  - Continue gluten free diet on discharge   - OP GI follow up as needed  - Continue PRN Lomotil for loose stools    HTN: BP elevated at times while at the TCU but improving near time of discharge. Continue amlodipine, carvedilol, Bumex, and hydralazine on discharge. Follow up with PCP for ongoing management     Hyperphosphatemia: In the setting of the above.   - Sevelamer is not covered by insurance. Given improving Cr, not prescribed on discharge. PCP to determine if needed going forward as renal function continues to normalize. Needs phos checked within 1 week of hspital discharge     Other  Medical Issues:  Asthma, COPD: No acute concerns. Continue inhalers  GERD: Continue renally dosed Pepcid   DM II: A1c 5.2. Diet controlled   Tobacco use disorder: Needs smoking cessation. Continue nicotine patch  HLD: Continue statin    Consultations This Hospital Stay   WOCN, PT, OT    Code Status   Full Code    Time Spent on this Encounter   I, Opal Zurita PA-C, personally saw the patient today and spent greater than 30 minutes discharging this patient.       Opal Zurita PA-C  [facility]ANSITIONAL [facility]ARE [address] [address]  Phone: [phone]  ______________________________________________________________________    Physical Exam   Vital Signs: Temp: 97.9  F (36.6  C) Temp src: Oral BP: (!) 155/64 Pulse: 66   Resp: 18 SpO2: 96 % O2 Device: None (Room air)    Weight: 132 lbs 15 oz  General Appearance: Alert and oriented x3, pleasant   HEENT: Anicteric sclera, MMM   Respiratory: Breathing comfortably on room air, lungs CTAB without wheezing or crackles   Cardiovascular: RRR, S1, S2. No murmur noted  GI: Abdomen soft, non-tender with active bowel sounds. No guarding or rebound  Lymph/Hematologic: R AKA with wound VAC in pace, CDI. Moves other extremities. No peripheral edema noted  Skin: No rash or jaundice   Musculoskeletal: Moves all extremities   Neurologic: No focal deficits, CN II-XII grossly intac       Primary Care Physician   Vasquez Benoit    Discharge Orders      CBC with platelets     Basic metabolic panel     Magnesium     Phosphorus     Home Care Referral      Adult Mental Health  Referral      Discharge Equipment: Wound Vac    Negative pressure wound therapy plan:  Wound location: Right thigh and right AKA  Change Days: Mon/Wed/Fri by WOC RN    Supplies (including all accessories) used: large Black foam , Adaptic/Curity oil emulsion contact layer , and Cavilon no sting barrier film  Cleanse with MicroKlenz prior to  "replacing NPWT  Suction setting: -125  Methods used: Window paned all periwound skin with vac drape prior to applying sponge, Bridged trac pad off bony prominences, Spiral cut foam prior to packing into wound , and Cut foam in half to reduce profile    Staff RN to assess integrity of dressing and ensure suction is set at appropriate level every shift.  Date canister. Chart canister output every shift. Change cannister weekly and PRN if full/occluded    Remove foam dressing and replace with BID normal saline moist gauze dressing if:  -a dressing failure which cannot be repaired within 2 hours  -patient is discharging to home without a home pump  -patient is discharging to a facility outside the local area  -if a dressing is a \"Silver Foam\", remove before Radiation Therapy or MRI    The hospital VAC pump is not to be discharged with the patient.?Ensure to disconnect patient from machine prior to discharge. Then,    - If a home KCI VAC pump has been delivered, connect home cannister to dressing tubing then connect cannister to home pump and turn on machine    - If transferring to a nearby facility with a KCI vac, can disconnect and clamp tubing then cover end with glove so can be reconnected within 2 hours     Reason for your hospital stay    You were admitted from the hospital to the [facility] for ongoing physical and occupational therapy as well as wound cares for you new wound VAC. You discharged home when you were progressing and medically stable.     Activity    Your activity upon discharge: activity as tolerated     When to contact your care team    Call or return if you develop fever >100.4, confusion, altered mental status, inability to care for yourself, any issues with your wound VAC or wound cares, falls, increased weakness, uncontrolled pain/abdominal pain/nausea/vomiting, or any other symptoms of concern to you     Discharge Instructions    Continue a low potassium diet and fluid restriction to 1500 ml " per day     Wound care and dressings    Coccyx/Sacrum - newly healed area:   1. Apply Sacral Mepilex every other day and prn if soiled or damaged.  2. Follow pressure injury prevention plan.     Adult [facility] Follow-up and recommended labs and tests    Follow up with primary care provider, Vasquez Benoit, 6/21/24 as planned hospital follow- up.  The following labs/tests are recommended: CBC, BMP, Mg, phos needed within 1 week.      - Follow up with vascular surgery 6/10 and 7/1 as planned  - Follow up with nephology at next available appointment     Appointments on [place] and/or [facility] (with [facility] or [facility] provider or service). Call [phone] if you haven't heard regarding these appointments within 7 days of discharge.     Full Code     Diet    Follow this diet upon discharge: Orders Placed This Encounter      Fluid restriction 1500 ML FLUID      Snacks/Supplements Adult: Other; Nepro at 10 am daily; Between Meals      Combination Diet Gluten Free Diet; 2 gm K Diet       Significant Results and Procedures   Most Recent 3 CBC's:  Recent Labs   Lab Test 06/06/24  0631 06/04/24  0921 06/01/24  0627 05/28/24  0840   WBC 4.7 5.9  --  5.1   HGB 9.1* 8.4*  --  9.2*   MCV 96 97  --  96    250 293 303     Most Recent 3 BMP's:  Recent Labs   Lab Test 06/06/24  0631 06/05/24  0841 06/01/24  0627   * 131* 132*   POTASSIUM 4.6 4.8 4.9   CHLORIDE 102 101 99   CO2 20* 21* 24   BUN 52.2* 55.6* 62.0*   CR 1.80* 1.94* 2.45*   ANIONGAP 10 9 9   SONIA 8.0* 7.9* 7.9*   GLC 74 114* 73     Most Recent 2 LFT's:  Recent Labs   Lab Test 05/16/24  0832 05/14/24  0805   AST 13 16   ALT 9 7   ALKPHOS 74 82   BILITOTAL 0.2 0.3     Most Recent 3 INR's:  Recent Labs   Lab Test 04/11/24  0643 10/07/23  0516 10/06/23  0551   INR 1.31* 1.38* 1.08       Discharge Medications   Current Discharge Medication List        START taking these medications    Details   oxyCODONE (ROXICODONE) 5 MG tablet Take 1 tablet (5 mg) by mouth 2  times daily as needed for moderate pain  Qty: 6 tablet, Refills: 0    Associated Diagnoses: Wound infection      saccharomyces boulardii (FLORASTOR) 250 MG capsule Take 1 capsule (250 mg) by mouth 2 times daily  Qty: 60 capsule, Refills: 0    Associated Diagnoses: Encounter for management of wound VAC           CONTINUE these medications which have CHANGED    Details   acetaminophen (TYLENOL) 500 MG tablet Take 1-2 tablets (500-1,000 mg) by mouth every 6 hours as needed for mild pain  Qty: 60 tablet, Refills: 0    Associated Diagnoses: Wound infection      amLODIPine (NORVASC) 10 MG tablet Take 1 tablet (10 mg) by mouth daily  Qty: 30 tablet, Refills: 0    Associated Diagnoses: Benign essential hypertension      bumetanide (BUMEX) 2 MG tablet Take 1 tablet (2 mg) by mouth daily  Qty: 30 tablet, Refills: 0    Associated Diagnoses: Primary hypertension      carvedilol (COREG) 12.5 MG tablet Take 1 tablet (12.5 mg) by mouth 2 times daily (with meals)  Qty: 60 tablet, Refills: 0    Associated Diagnoses: Benign essential hypertension      cetirizine (ZYRTEC) 5 MG tablet Take 1 tablet (5 mg) by mouth daily  Qty: 30 tablet, Refills: 0    Associated Diagnoses: Chronic allergic rhinitis due to animal hair and dander      clopidogrel (PLAVIX) 75 MG tablet Take 1 tablet (75 mg) by mouth daily  Qty: 30 tablet, Refills: 0    Associated Diagnoses: Peripheral vascular disease (H24)      diphenoxylate-atropine (LOMOTIL) 2.5-0.025 MG tablet Take 1 tablet by mouth 4 times daily as needed for diarrhea  Qty: 30 tablet, Refills: 0    Associated Diagnoses: Wound infection      famotidine (PEPCID) 20 MG tablet Take 1 tablet (20 mg) by mouth every 48 hours  Qty: 30 tablet, Refills: 0    Associated Diagnoses: Gastroesophageal reflux disease with esophagitis, unspecified whether hemorrhage      fluticasone-vilanterol (BREO ELLIPTA) 200-25 MCG/ACT inhaler Inhale 1 puff into the lungs daily  Qty: 1 each, Refills: 0    Associated Diagnoses:  Chronic obstructive pulmonary disease, unspecified COPD type (H)      gabapentin (NEURONTIN) 100 MG capsule Take 2 capsules (200 mg) by mouth at bedtime  Qty: 60 capsule, Refills: 0    Associated Diagnoses: Wound infection      hydrALAZINE (APRESOLINE) 10 MG tablet Take 1 tablet (10 mg) by mouth 4 times daily  Qty: 120 tablet, Refills: 0    Associated Diagnoses: Primary hypertension      miconazole (MICATIN) 2 % external powder Apply topically 2 times daily  Qty: 50 g, Refills: 0    Associated Diagnoses: Fecal incontinence alternating with constipation      multivitamin RENAL (TRIPHROCAPS) 1 capsule capsule Take 1 capsule by mouth daily  Qty: 30 capsule, Refills: 0    Associated Diagnoses: Dialysis patient (H24)      nicotine (NICODERM CQ) 14 MG/24HR 24 hr patch Place 1 patch onto the skin daily  Qty: 30 patch, Refills: 0    Associated Diagnoses: Tobacco use disorder      polyethylene glycol (MIRALAX) 17 GM/Dose powder Take 17 g by mouth daily  Qty: 510 g, Refills: 0    Associated Diagnoses: Wound infection      rosuvastatin (CRESTOR) 10 MG tablet Take 1 tablet (10 mg) by mouth at bedtime  Qty: 30 tablet, Refills: 0    Associated Diagnoses: Hyperlipidemia LDL goal <70      sevelamer HCl (RENAGEL) 400 MG tablet Take 1 tablet (400 mg) by mouth 3 times daily (with meals)  Qty: 120 tablet, Refills: 0    Associated Diagnoses: Renal failure, unspecified chronicity      wound support modular (EXPEDITE) LIQD bottle Take 60 mLs by mouth daily  Qty: 2000 mL, Refills: 0    Associated Diagnoses: S/P AKA (above knee amputation) unilateral, right (H)           CONTINUE these medications which have NOT CHANGED    Details   nitroGLYcerin (NITROSTAT) 0.4 MG sublingual tablet Place 1 tablet (0.4 mg) under the tongue See Admin Instructions for chest pain  Qty: 30 tablet, Refills: 11    Associated Diagnoses: Coronary artery disease involving native coronary artery of native heart without angina pectoris      ondansetron (ZOFRAN ODT) 4  MG ODT tab Take 1 tablet (4 mg) by mouth every 6 hours as needed for nausea or vomiting    Associated Diagnoses: Nausea      silver sulfADIAZINE (SILVADENE) 1 % external cream Apply topically daily    Associated Diagnoses: S/P AKA (above knee amputation) unilateral, right (H)           STOP taking these medications       bisacodyl (DULCOLAX) 10 MG suppository Comments:   Reason for Stopping:         lactobacillus rhamnosus, GG, (CULTURELL) capsule Comments:   Reason for Stopping:             Allergies   Allergies   Allergen Reactions    Pantoprazole      Protonix caused diffuse edema    Chantix [Varenicline]      Terrible dreams    Contrast Dye Swelling     Patient reports facial and throat swelling with prior CT contrast.    Penicillins Itching and Rash       Clinically Significant Risk Factors     # Moderate Malnutrition: based on nutrition assessment

## 2024-06-07 NOTE — PLAN OF CARE
Goal Outcome Evaluation:         MDS Pain Assessment    The following is the pain interview as conducted by the TCU RN caring for the patient on June 7, 2024. This assessment is required by the [facility] for all patients in [place] SNF (Skilled Nursing Facilities).     . Pain Presence  Have you had pain or hurting at any time in the last 5 days?   1. Yes    . Pain Frequency  How much of the time have you experienced pain or hurting over the last 5 days?   3. Frequently    . Pain Effect on Sleep  Over the past 5 days, how much of the time has pain made it hard for you to sleep at night?   2. Occasionally    . Pain Interference with Therapy Activities  Over the past 5 days, how often have you limited your participation in rehabilitation therapy sessions due to pain?   2. Occasionally    . Pain Interference with Day-to-Day Activities  Over the past 5 days, have you limited your day-to-day activities (excluding rehabilitation therapy sessions) because of pain?  1. Rarely or not at all    . Pain intensity   Numeric Rating Scale (00-10): Please rate your worst pain over the last 5 days on a zero to ten scale, with zero being no pain and ten as the worst pain you can imagine. 07

## 2024-06-08 VITALS
RESPIRATION RATE: 18 BRPM | WEIGHT: 132.94 LBS | DIASTOLIC BLOOD PRESSURE: 73 MMHG | OXYGEN SATURATION: 93 % | TEMPERATURE: 98.7 F | BODY MASS INDEX: 25.13 KG/M2 | HEART RATE: 65 BPM | SYSTOLIC BLOOD PRESSURE: 174 MMHG

## 2024-06-08 PROCEDURE — 250N000013 HC RX MED GY IP 250 OP 250 PS 637: Performed by: PHYSICIAN ASSISTANT

## 2024-06-08 PROCEDURE — 250N000011 HC RX IP 250 OP 636: Performed by: PHYSICIAN ASSISTANT

## 2024-06-08 RX ADMIN — HYDRALAZINE HYDROCHLORIDE 10 MG: 10 TABLET, FILM COATED ORAL at 09:59

## 2024-06-08 RX ADMIN — Medication 60 ML: at 10:02

## 2024-06-08 RX ADMIN — HEPARIN SODIUM 5000 UNITS: 5000 INJECTION, SOLUTION INTRAVENOUS; SUBCUTANEOUS at 06:14

## 2024-06-08 RX ADMIN — NICOTINE 1 PATCH: 14 PATCH, EXTENDED RELEASE TRANSDERMAL at 10:01

## 2024-06-08 RX ADMIN — AMLODIPINE BESYLATE 10 MG: 10 TABLET ORAL at 09:59

## 2024-06-08 RX ADMIN — CLOPIDOGREL BISULFATE 75 MG: 75 TABLET ORAL at 09:59

## 2024-06-08 RX ADMIN — FLUTICASONE FUROATE AND VILANTEROL TRIFENATATE 1 PUFF: 200; 25 POWDER RESPIRATORY (INHALATION) at 10:01

## 2024-06-08 RX ADMIN — SEVELAMER HYDROCHLORIDE 400 MG: 400 TABLET, FILM COATED ORAL at 10:00

## 2024-06-08 RX ADMIN — MICONAZOLE NITRATE: 20 POWDER TOPICAL at 10:02

## 2024-06-08 RX ADMIN — CARVEDILOL 12.5 MG: 12.5 TABLET, FILM COATED ORAL at 09:59

## 2024-06-08 RX ADMIN — CETIRIZINE HYDROCHLORIDE 5 MG: 5 TABLET ORAL at 09:59

## 2024-06-08 RX ADMIN — BUMETANIDE 2 MG: 2 TABLET ORAL at 09:59

## 2024-06-08 RX ADMIN — Medication 1 CAPSULE: at 09:59

## 2024-06-08 ASSESSMENT — ACTIVITIES OF DAILY LIVING (ADL)
ADLS_ACUITY_SCORE: 55

## 2024-06-08 NOTE — PLAN OF CARE
Physical Therapy Discharge Summary    Reason for therapy discharge:    Discharged to home with home therapy.    Progress towards therapy goal(s). See goals on Care Plan in [facility] electronic health record for goal details.  Shirley is currently performing transfers with mod A for squat pivots and mod to max A for slide board transfers pending pain levels. She is able to self propel WC independently 135' and continues to need assist for clothing management for toileting transfers. Patient refused final therapy session, therefore unable to perform car transfer, but confident family will be able to assist her safely with this. Pt has needed equipment for discontinue.    Therapy recommendation(s):    Continued therapy is recommended.  Rationale/Recommendations:  Will benefit from home PT for home safety assessment and to progress safety, strength, and independence with functional mobility at discharge.

## 2024-06-08 NOTE — PLAN OF CARE
"Goal Outcome Evaluation:    VS: BP (!) 174/73 (BP Location: Left arm)   Pulse 65   Temp 98.7  F (37.1  C) (Oral)   Resp 18   Wt 60.3 kg (132 lb 15 oz)   LMP  (LMP Unknown)   SpO2 93%   BMI 25.13 kg/m     O2: RA   Output: PureWick on, then brief for discharge   Last BM: 6/7; pt reported feeling a little constipated   Activity: Prepping for discharge, making many telephone calls, packing up room, up in w/c   Skin: X: BUE bruises, healed skin tears  Sacral Mepilex \"P\"   Pain: Denies    CMS:  Neuro: Intact X: R AKA w/ wound vac  A&O, directs cares   Dressing: RLE wound vac CDI  Sacral Mepilex CDI   Diet: 2 g K+, Gluten free  FR 1500 mL   LDA: Wound vac, no alarms noted. Pt wound vac arrived on unit and placed on pt, set to 125 mmHg w/ no alarms noted.   Sliding board and w/c left w/ pt.    Equipment: W/c, sliding board, suction, Purewick, wound vac   Plan: Con't POC.    Additional Info: Pt's personal belongings packed up and left unit w/ pt and her sister about 1120. Pt's personal wound vac applied and settings made per orders. Paperwork reviewed w/ pt. Questions encouraged, asked, and answered. Pt given hard copy of oxycodone script; other meds to be picked up by pt at [facility].   Pt reported going to see her  in ICU on WB after discharge.      Patient's most recent vital signs are:     Vital signs:  BP: 174/73  Temp: 98.7  HR: 65  RR: 18  SpO2: 93 %     Patient does not have new respiratory symptoms.  Patient does not have new sore throat.  Patient does not have a fever greater than 99.5.    "

## 2024-06-08 NOTE — PLAN OF CARE
Goal Outcome Evaluation:  No acute issues overnight. Appears to be sleeping well and has no c/o's or requests during shift. (R) AKA / thigh area wound vac intact/patent. Uses purewick. 1500ml FR. SB transfers. Planning discharge home today.        Patient's most recent vital signs are:     Vital signs:  BP: 174/72[Hydralazine given[  Temp: 98  HR: 63  RR: 18  SpO2: 96 %     Patient does not have new respiratory symptoms.  Patient does not have new sore throat.  Patient does not have a fever greater than 99.5.

## 2024-06-10 ENCOUNTER — OFFICE VISIT (OUTPATIENT)
Dept: OTHER | Facility: CLINIC | Age: 64
End: 2024-06-10
Payer: COMMERCIAL

## 2024-06-10 ENCOUNTER — TELEPHONE (OUTPATIENT)
Dept: INTERNAL MEDICINE | Facility: CLINIC | Age: 64
End: 2024-06-10

## 2024-06-10 ENCOUNTER — TELEPHONE (OUTPATIENT)
Dept: OTHER | Facility: CLINIC | Age: 64
End: 2024-06-10

## 2024-06-10 VITALS — DIASTOLIC BLOOD PRESSURE: 83 MMHG | HEART RATE: 64 BPM | SYSTOLIC BLOOD PRESSURE: 187 MMHG

## 2024-06-10 DIAGNOSIS — F17.219 CIGARETTE NICOTINE DEPENDENCE WITH NICOTINE-INDUCED DISORDER: Primary | ICD-10-CM

## 2024-06-10 PROCEDURE — 99024 POSTOP FOLLOW-UP VISIT: CPT

## 2024-06-10 PROCEDURE — G0463 HOSPITAL OUTPT CLINIC VISIT: HCPCS

## 2024-06-10 RX ORDER — NICOTINE 21 MG/24HR
1 PATCH, TRANSDERMAL 24 HOURS TRANSDERMAL EVERY 24 HOURS
Qty: 9 PATCH | Refills: 0 | Status: SHIPPED | OUTPATIENT
Start: 2024-06-10

## 2024-06-10 NOTE — PROGRESS NOTES
[facility]        Patient is here for a post-op.    Pt is currently taking Statin and Plavix.    BP (!) 187/83 (BP Location: Left arm, Patient Position: Chair, Cuff Size: Adult Regular)   Pulse 64   LMP  (LMP Unknown)     The provider has been notified that the patient has no concerns.     Questions patient would like addressed today are: N/A.    Refills are needed: N/A    Has homecare services and agency name:  Isa Al MA

## 2024-06-10 NOTE — PROGRESS NOTES
VASCULAR SURGERY PROGRESS NOTE    [facility]    Shirley Hendricks  Medical Record #: [id number]  YOB: 1960  Age: 63 year old     Date of Service: 6/10/2024    PRIMARY CARE PROVIDER: Vasquez Benoit    Reason for visit:  post-op    Shirley Hendricks is a 63 year old female who underwent an I and D of her AKA on 5/16/2024 and 5/20/2024. She has since had wound vac placement. She comes into clinic today for her postoperative examination, and on exam Shirley is doing well, no issues noted. Around wound vac shows healthy tissue, no wound or ischemic changes. Home care is coming today to change her vac and Shirley wants her sister there, discussed placing mychart picture of wound so further assessment. Follow-up is scheduled for July 1st. Continue on plavix 75 mg indefinitely as she has bypass in her left leg. Nicotine patches ordered as none were given to Shirley at discharge.     REVIEW OF SYSTEMS:    A 12 point ROS was reviewed and is negative except for what is listed above in HPI.    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 clot    Post-menopausal     Reflux esophagitis 02/2004    EGD: esophagitis and  medium HH    SBO (small bowel obstruction) (H) 08/10/2021    SVT (supraventricular tachycardia) (H24)     Thrombocytopenia (H24)     Uncomplicated asthma     Vitamin C deficiency 08/12/2018          Past Surgical History:   Procedure Laterality Date    AMPUTATE LEG ABOVE KNEE N/A 4/1/2024    Procedure: AMPUTATION, ABOVE KNEE right leg with wound vac dressing, excision of anteriotibial bypass graft, closure of (previous interventional radiology) left groin incision;  Surgeon: José Luis Hernandez MD;  Location: [facility]    AMPUTATE LEG ABOVE KNEE Right 4/9/2024    Procedure: COMPLETION RIGHT ABOVE KNEE AMPUTATION;  Surgeon: José Luis Hernandez MD;  Location: [facility]    ANGIOGRAM      ANGIOGRAM Right 6/23/2021    Procedure: RIGHT LOWER EXTREMITY ANGIOGRAM WITH LEFT BRACHIAL ARTERY CUTDOWN;  Surgeon: José Luis Hernandez MD;  Location: [facility]    APPLY WOUND VAC Right 5/16/2024    Procedure: PLACEMENT OF WOUND VAC TO RIGHT ABOVE KNEE AMPUTATION;  Surgeon: Tay Enciso MD;  Location[facility]    APPLY WOUND VAC N/A 5/20/2024    Procedure: AND PLACEMENT OF WOUND VAC;  Surgeon: José Luis Hernandez MD;  Location: [facility]    BIOPSY MASS NECK Right 10/11/2023    Procedure: Right Parotid Biopsy;  Surgeon: Randal Mendoaz MD;  Location: [facility]    BYPASS GRAFT FEMOROPOPLITEAL Right 09/23/2020    Procedure: RIGHT FEMORAL GRAFT TO ABOVE-KNEE POPLITEAL BYPASS USING CADAVERIC SUPERFICIAL FEMORAL ARTERY;  Surgeon: Chadwick Lozano MD;  Location: [facility]    BYPASS GRAFT FEMOROPOPLITEAL Right 2/15/2022    Procedure: RIGHT SUPERFICIAL FEMORAL ARTERY GRAFT TO BELOW KNEE POPLITEAL BYPASS WITH CADAVERIC CRYOLIFE  FEMORAL-POPLITEAL ARTERY SIZE: OUTER DIAMETER: 7MM - 6MM;  Surgeon: Chadwick Lozano;  Location: [facility]    BYPASS GRAFT FEMOROPOPLITEAL Right 5/26/2023    Procedure: RIGHT DISTAL SUPERFICIAL FEMORAL GRAFT TO ANTERIOR TIBIAL ARTERY WITH 26 CENTIMETER CADAVERIC SUPERFICIAL FEMORAL ARTERY GRAFT;  Surgeon: Blake  Chadwick Osman MD;  Location:  OR    BYPASS GRAFT FEMOROPOPLITEAL Right 10/11/2023    Procedure: RIGHT FEMORAL TO POPLITEAL GRAFT THROMBECTOMY;  Surgeon: Chadwick Lozano MD;  Location:  OR    BYPASS GRAFT INSITU FEMOROPOPLITEAL Right 7/7/2021    Procedure: CREATION RIGHT FEMORAL TO POPLITEAL ARTERIAL BYPASS USING INSITU VEIN GRAFT;  Surgeon: José Luis Hernandez MD;  Location:  OR    CARDIAC CATHERIZATION  09/03/2009    multivessel CAD without target lesions, med mgmt indicated, preserved ef    CARPAL TUNNEL RELEASE RT/LT Right 05/20/2021    COLONOSCOPY N/A 12/12/2023    Procedure: Colonoscopy;  Surgeon: Corey Hoffman MD;  Location:  GI    COLONOSCOPY N/A 12/14/2023    Procedure: Colonoscopy;  Surgeon: Corey Hoffman MD;  Location:  GI    CV CORONARY ANGIOGRAM N/A 10/4/2023    Procedure: Coronary Angiogram;  Surgeon: Rogelio Ricks MD;  Location:  [facility]ATH [facility]    CV PCI N/A 10/4/2023    Procedure: Percutaneous Coronary Intervention;  Surgeon: Rogelio Ricks MD;  Location:  [facility]    EMBOLECTOMY LOWER EXTREMITY Right 10/6/2023    Procedure: Right anterior tibial bypass with graft, Right tibial endarterectomy,thrombectomy, Right doraslis pedis thrombectomy, Anterior Tibial vein patch.;  Surgeon: Chadwick Lozano MD;  Location:  OR    ENDARTERECTOMY CAROTID Right 05/11/2016    Procedure: ENDARTERECTOMY CAROTID;  Surgeon: Chadwick Lozano MD;  Location:  OR    ENDARTERECTOMY CAROTID Left 06/08/2020    Procedure: LEFT CAROTID ENDARTERECTOMY with distal facal vein patch  and EEG;  Surgeon: Chadwick Lozano MD;  Location:  OR    ESOPHAGOSCOPY, GASTROSCOPY, DUODENOSCOPY (EGD), COMBINED N/A 12/12/2023    Procedure: Esophagoscopy, gastroscopy, duodenoscopy (EGD), combined;  Surgeon: Corey Hoffman MD;  Location:  GI    FINE NEEDLE ASPIRATION WITHOUT IMAGING GUIDANCE Right 9/22/2023    Procedure: Fine  needle aspiration without imaging guidance;  Surgeon: Kiersten Aguilera MD;  Location: [facility]    FLUORESCENCE INTRAOPERATIVE VASCULAR ANGIOGRAPHY Right 12/28/2022    Procedure: RIGHT LEG ANGIOGRAM with intervention;  Surgeon: Chadwick Lozano MD;  Location:  OR    GYN SURGERY  left tube    left salpingectomy    IR ANGIOGRAM THROUGH CATHETER (ARTERIAL)  10/6/2023    IR ANGIOGRAM THROUGH CATHETER (ARTERIAL)  10/6/2023    IR ANGIOGRAM THROUGH CATHETER (ARTERIAL)  3/31/2024    IR ANGIOGRAM THROUGH CATHETER (ARTERIAL)  3/30/2024    IR CVC TUNNEL PLACEMENT > 5 YRS OF AGE  4/3/2024    IR CVC TUNNEL REMOVAL RIGHT  5/28/2024    IR CVC TUNNEL REVISION RIGHT  4/15/2024    IR LOWER EXTREMITY ANGIOGRAM RIGHT  05/25/2021    IR LOWER EXTREMITY ANGIOGRAM RIGHT  10/5/2023    IR LOWER EXTREMITY ANGIOGRAM RIGHT  3/29/2024    IR OR ANGIOGRAM  6/23/2021    IR OR ANGIOGRAM  12/28/2022    IRRIGATION AND DEBRIDEMENT LOWER EXTREMITY, COMBINED Right 5/16/2024    Procedure: IRRIGATION AND DEBRIDEMENT OF RIGHT ABOVE KNEE AMPUTATION;  Surgeon: Tay Enciso MD;  Location:  OR    IRRIGATION AND DEBRIDEMENT LOWER EXTREMITY, COMBINED Right 5/20/2024    Procedure: IRRIGATION AND DEBRIDMENT RIGHT LOWER EXTREMITY;  Surgeon: José Luis Hernandez MD;  Location:  OR    LAPAROSCOPIC CHOLECYSTECTOMY N/A 09/27/2017    Procedure: LAPAROSCOPIC CHOLECYSTECTOMY;  LAPAROSCOPIC CHOLECYSTECTOMY;  Surgeon: Jacoby Tapia MD;  Location:  [facility]    LAPAROSCOPY DIAGNOSTIC (GENERAL) N/A 8/11/2021    Procedure: Exploratory laparoscopy,  laparoscopic lysis of adhesions, laparotomy;  Surgeon: Corina Ferreira MD;  Location:  OR    OR ANGIOGRAM, LOWER EXTREMITY Right 10/11/2023    Procedure: Or Angiogram, Lower Extremity;  Surgeon: Chadwick Lozano MD;  Location:  OR    ORTHOPEDIC SURGERY      left knee surgery    REPAIR ANEURYSM FEMORAL ARTERY Left 12/28/2022    Procedure: REPAIR LEFT FEMORAL PSEUDOANEURYSM;  Surgeon: Blake  Chadwick Osman MD;  Location:  OR    VASCULAR SURGERY  aoto bi fem  left fem-AK pop    [facility] FABRIC WRAPPING OF ABDOMINAL ANEURYSM      [facility] NONSPECIFIC PROCEDURE  12/2000    angioplasty with stent right fem. a.    [facility] NONSPECIFIC PROCEDURE  1987    sinus surgery    [facility] NONSPECIFIC PROCEDURE  09/02/2009    Emergent left groin exploration with oversewing of bleeding angiographic site. 2. Endarterectomy of common femoral-proximal superficial femoral artery with greater saphenous vein patch angioplasty.   a. [place] of accessory right greater saphenous vein.     [facility] NONSPECIFIC PROCEDURE  08/27/2009    occluded left common iliac and external iliac arteries were successfully revascularized with stenting to 8 and 7 mm        ALLERGIES:  Pantoprazole, Chantix [varenicline], Contrast dye, and Penicillins    MEDS:    Current Outpatient Medications:     acetaminophen (TYLENOL) 500 MG tablet, Take 1-2 tablets (500-1,000 mg) by mouth every 6 hours as needed for mild pain, Disp: 60 tablet, Rfl: 0    amLODIPine (NORVASC) 10 MG tablet, Take 1 tablet (10 mg) by mouth daily, Disp: 30 tablet, Rfl: 0    bumetanide (BUMEX) 2 MG tablet, Take 1 tablet (2 mg) by mouth daily, Disp: 30 tablet, Rfl: 0    carvedilol (COREG) 12.5 MG tablet, Take 1 tablet (12.5 mg) by mouth 2 times daily (with meals), Disp: 60 tablet, Rfl: 0    cetirizine (ZYRTEC) 5 MG tablet, Take 1 tablet (5 mg) by mouth daily, Disp: 30 tablet, Rfl: 0    clopidogrel (PLAVIX) 75 MG tablet, Take 1 tablet (75 mg) by mouth daily, Disp: 30 tablet, Rfl: 0    diphenoxylate-atropine (LOMOTIL) 2.5-0.025 MG tablet, Take 1 tablet by mouth 4 times daily as needed for diarrhea, Disp: 30 tablet, Rfl: 0    famotidine (PEPCID) 20 MG tablet, Take 1 tablet (20 mg) by mouth every 48 hours, Disp: 30 tablet, Rfl: 0    fluticasone-vilanterol (BREO ELLIPTA) 200-25 MCG/ACT inhaler, Inhale 1 puff into the lungs daily, Disp: 1 each, Rfl: 0    gabapentin (NEURONTIN) 100 MG capsule, Take 2 capsules  (200 mg) by mouth at bedtime, Disp: 60 capsule, Rfl: 0    hydrALAZINE (APRESOLINE) 10 MG tablet, Take 1 tablet (10 mg) by mouth 4 times daily, Disp: 120 tablet, Rfl: 0    miconazole (MICATIN) 2 % external powder, Apply topically 2 times daily, Disp: 50 g, Rfl: 0    nicotine (NICODERM CQ) 14 MG/24HR 24 hr patch, Place 1 patch onto the skin every 24 hours, Disp: 9 patch, Rfl: 0    nicotine (NICODERM CQ) 14 MG/24HR 24 hr patch, Place 1 patch onto the skin daily, Disp: 30 patch, Rfl: 0    nitroGLYcerin (NITROSTAT) 0.4 MG sublingual tablet, Place 1 tablet (0.4 mg) under the tongue See Admin Instructions for chest pain, Disp: 30 tablet, Rfl: 11    ondansetron (ZOFRAN ODT) 4 MG ODT tab, Take 1 tablet (4 mg) by mouth every 6 hours as needed for nausea or vomiting, Disp: , Rfl:     oxyCODONE (ROXICODONE) 5 MG tablet, Take 1 tablet (5 mg) by mouth 2 times daily as needed for moderate pain, Disp: 6 tablet, Rfl: 0    polyethylene glycol (MIRALAX) 17 GM/Dose powder, Take 17 g by mouth daily, Disp: 510 g, Rfl: 0    rosuvastatin (CRESTOR) 10 MG tablet, Take 1 tablet (10 mg) by mouth at bedtime, Disp: 30 tablet, Rfl: 0    saccharomyces boulardii (FLORASTOR) 250 MG capsule, Take 1 capsule (250 mg) by mouth 2 times daily, Disp: 60 capsule, Rfl: 0    silver sulfADIAZINE (SILVADENE) 1 % external cream, Apply topically daily, Disp: , Rfl:     wound support modular (EXPEDITE) LIQD bottle, Take 60 mLs by mouth daily, Disp: 2000 mL, Rfl: 0    SOCIAL HABITS:    History   Smoking Status    Former    Types: Cigarettes   Smokeless Tobacco    Never     Social History    Substance and Sexual Activity      Alcohol use: Not Currently        Comment: x1-2 yr      History   Drug Use    Types: Marijuana     Comment: 2x per day       FAMILY HISTORY:    Family History   Problem Relation Age of Onset    Cancer Mother         bladder    Cardiovascular Father         alive,multiple heart attacks    Diabetes Maternal Grandmother     Lung Cancer Maternal  Grandmother     Blood Disease Brother         clotting disorder       PE:  BP (!) 187/83 (BP Location: Left arm, Patient Position: Chair, Cuff Size: Adult Regular)   Pulse 64   LMP  (LMP Unknown)   Wt Readings from Last 1 Encounters:   06/05/24 132 lb 15 oz (60.3 kg)     There is no height or weight on file to calculate BMI.      DIAGNOSTIC STUDIES:     Images:  IR CVC Tunnel Removal Right    Result Date: 5/28/2024  PROCEDURE: Tunneled central venous catheter removal. HISTORY: Patient no longer requires tunneled central venous catheter. PROCEDURE/FINDINGS: The catheter and exit site on the chest wall were prepped and draped in the usual sterile fashion. The catheter was pulled out completely. Pressure was held at the venotomy and catheter exit site for 5 minutes. There was no evidence of supraclavicular hematoma, and bleeding was controlled at the chest site.  The site was cleansed and dressed. The procedure was well tolerated.     IMPRESSION: Tunneled central venous catheter removal. ALBERTO BENITEZ MD   SYSTEM ID:  [id number]    US Lower Extremity Arterial Duplex Right    Result Date: 5/16/2024  EXAM: US LOWER EXTREMITY ARTERIAL DUPLEX RIGHT LOCATION: [facility] DATE: 5/15/2024 INDICATION: History of right above-the-knee amputation, aortobifemoral bypass graft, SFA occlusion and previous right bypass graft removal. Evaluation for profunda artery origin patency. COMPARISON: None. TECHNIQUE: Duplex utilizing 2D gray-scale imaging, Doppler interrogation with color-flow and spectral waveform analysis. FINDINGS: RIGHT LOWER EXTREMITY ARTERIAL ASSESSMENT: Common femoral artery: 56 cm/s, 8.1 mm in diameter Profunda femoris artery: 564 cm/s. This artery measures 6.6 mm in maximal diameter. However, a focal severe stenosis is seen proximally near its origin measuring approximately 1.5 mm on grayscale images, although visualization is somewhat difficult due to shadowing from overlying calcified  plaques.     IMPRESSION: 1.  Severe focal atherosclerotic narrowing in the proximal right profunda artery near its origin, with markedly elevated peak systolic flow velocity.    XR Abdomen 1 View    Result Date: 5/14/2024  EXAM: Abdominal radiograph 5/14/2024 5:58 PM HISTORY: 63 years Female reassess stool burden. COMPARISON: 5/9/2024. TECHNIQUE: Single frontal supine view(s) of the abdomen. FINDINGS: Partial visualization of central catheter in the right atrium. Biiliac stents. Surgical clips overlie the right or quadrant. Embolization coiling material in the right lower quadrant. No abnormally dilated loops of bowel. Moderate colonic stool burden. Vascular calcifications. Left lower lobe atelectasis/consolidation     IMPRESSION: Nonobstructive bowel gas pattern. Moderate stool burden is mildly increased compared to the 5/9/2024 exam. I have personally reviewed the examination and initial interpretation and I agree with the findings. SELENA MANSFIELD MD   SYSTEM ID:  [id number]    LABS:      Sodium   Date Value Ref Range Status   06/07/2024 132 (L) 135 - 145 mmol/L Final     Comment:     Reference intervals for this test were updated on 09/26/2023 to more accurately reflect our healthy population. There may be differences in the flagging of prior results with similar values performed with this method. Interpretation of those prior results can be made in the context of the updated reference intervals.    06/06/2024 132 (L) 135 - 145 mmol/L Final     Comment:     Reference intervals for this test were updated on 09/26/2023 to more accurately reflect our healthy population. There may be differences in the flagging of prior results with similar values performed with this method. Interpretation of those prior results can be made in the context of the updated reference intervals.    06/05/2024 131 (L) 135 - 145 mmol/L Final     Comment:     Reference intervals for this test were updated on 09/26/2023 to more accurately  reflect our healthy population. There may be differences in the flagging of prior results with similar values performed with this method. Interpretation of those prior results can be made in the context of the updated reference intervals.    07/09/2021 132 (L) 133 - 144 mmol/L Final   07/08/2021 128 (L) 133 - 144 mmol/L Final   07/07/2021 134 133 - 144 mmol/L Final     Urea Nitrogen   Date Value Ref Range Status   06/07/2024 50.1 (H) 8.0 - 23.0 mg/dL Final   06/06/2024 52.2 (H) 8.0 - 23.0 mg/dL Final   06/05/2024 55.6 (H) 8.0 - 23.0 mg/dL Final   12/29/2022 17 7 - 30 mg/dL Final   02/11/2022 14 7 - 30 mg/dL Final   08/15/2021 8 7 - 30 mg/dL Final   07/09/2021 13 7 - 30 mg/dL Final   07/08/2021 13 7 - 30 mg/dL Final   06/21/2021 13 7 - 30 mg/dL Final     Hemoglobin   Date Value Ref Range Status   06/06/2024 9.1 (L) 11.7 - 15.7 g/dL Final   06/04/2024 8.4 (L) 11.7 - 15.7 g/dL Final   05/28/2024 9.2 (L) 11.7 - 15.7 g/dL Final   07/09/2021 8.8 (L) 11.7 - 15.7 g/dL Final   07/08/2021 8.8 (L) 11.7 - 15.7 g/dL Final   06/21/2021 12.4 11.7 - 15.7 g/dL Final     Platelet Count   Date Value Ref Range Status   06/06/2024 272 150 - 450 10e3/uL Final   06/04/2024 250 150 - 450 10e3/uL Final   06/01/2024 293 150 - 450 10e3/uL Final   07/09/2021 169 150 - 450 10e9/L Final   07/08/2021 158 150 - 450 10e9/L Final   06/21/2021 124 (L) 150 - 450 10e9/L Final     INR   Date Value Ref Range Status   04/11/2024 1.31 (H) 0.85 - 1.15 Final   10/07/2023 1.38 (H) 0.85 - 1.15 Final   10/06/2023 1.08 0.85 - 1.15 Final   09/24/2020 1.01 0.86 - 1.14 Final   05/10/2016 0.95 0.86 - 1.14 Final   04/21/2016 1.02 0.86 - 1.14 Final       20 minutes spent on the day of encounter doing chart review, history and exam, documentation, and further activities as noted.     Kaylee Liu, NP  VASCULAR SURGERY

## 2024-06-10 NOTE — TELEPHONE ENCOUNTER
Home Care is calling regarding an established patient with [facility].    Requesting orders from: Vasquez Benoit  Provider is following patient: Yes  Is this a 60-day recertification request?  No    Orders Requested    Skilled Nursing  Request for initial certification (first set of orders)     Frequency:  3x/wk x 8 wks for wound vac dressing change and 3 prn visits.     Information was gathered and will be sent to provider for review.  RN will contact Home Care with information after provider review.  Confirmed ok to leave a detailed message with call back.  Contact information confirmed and updated as needed.    Vianney Plunkett RN

## 2024-06-10 NOTE — TELEPHONE ENCOUNTER
[facility]    Who is the name of the provider?:  PA JAVIER   What is the location you see this provider at/preferred location?: April  Person calling / Facility: [facility] -Valerie RN  Phone number:  [phone]  Nurse call back needed:  Yes     Reason for call:  Patient called with the home care nurse Valerie. Patient was seen today by Pa Javier NP. Home care nurse is needing to clarify which medications were discontinued and will need a new after visit summary sent as the print out from today apparently says 6/7/24.    Pharmacy location:  [id number] [address]  Outside Imaging: n/a   Can we leave a detailed message on this number?  YES     6/10/2024, 4:22 PM

## 2024-06-11 ENCOUNTER — TELEPHONE (OUTPATIENT)
Dept: INTERNAL MEDICINE | Facility: CLINIC | Age: 64
End: 2024-06-11
Payer: COMMERCIAL

## 2024-06-11 NOTE — TELEPHONE ENCOUNTER
Called and left message for Eva with the provider's response. Advised Eva to call the clinic back with any additional questions and.or concerns.     Mary Murillo RN

## 2024-06-11 NOTE — TELEPHONE ENCOUNTER
Discussed with Kaylee.      No meds discontinued.  Oxycodone and Miralax were discussed to take as needed.  The 6/7/24 print out that is being referred to is likely the discharge AVS from the TCU facility.    Returned call to Valerie and discussed the above.  Valerie verbalized that no medications were discontinued.    Alaina Yanez, ESTHERN, RN, [facility] [place]

## 2024-06-11 NOTE — TELEPHONE ENCOUNTER
Home Care is calling regarding an established patient with [facility].    Requesting orders from: Vasquez Benoit  Provider is following patient: Yes  Is this a 60-day recertification request?  No    Orders Requested    Occupational Therapy  Request for initial certification (first set of orders)     Frequency:  once time a week for one week, two times a week for 3 weeks, one time a week for one week.   For ADL retraining and strengthening.     Information was gathered and will be sent to provider for review.  RN will contact Home Care with information after provider review.  Confirmed ok to leave a detailed message with call back.  Contact information confirmed and updated as needed.    Vianney Plunkett RN

## 2024-06-11 NOTE — TELEPHONE ENCOUNTER
Called and left message for Kera with the provider's response. Advised Kera to call the clinic back with any additional questions and/or concerns.     Mary Murillo RN

## 2024-06-12 ENCOUNTER — PATIENT OUTREACH (OUTPATIENT)
Dept: CARE COORDINATION | Facility: CLINIC | Age: 64
End: 2024-06-12
Payer: COMMERCIAL

## 2024-06-12 ENCOUNTER — TELEPHONE (OUTPATIENT)
Dept: INTERNAL MEDICINE | Facility: CLINIC | Age: 64
End: 2024-06-12
Payer: COMMERCIAL

## 2024-06-12 NOTE — PROGRESS NOTES
Clinic Care Coordination Contact  [facility]/Voicemail    Clinical Data: Care Coordinator Outreach    Outreach Documentation Number of Outreach Attempt   6/12/2024   1:53 PM 1       Left message on patient's voicemail with call back information and requested return call.    Plan: Care Coordinator will send care coordination introduction letter with care coordinator contact information and explanation of care coordination services via [employer]. Care Coordinator will try to reach patient again in 1-2 business days.    Merissa Martinez,  [facility]  Clinic Care Coordinator  [facility]  [phone]  [email or web address]

## 2024-06-12 NOTE — TELEPHONE ENCOUNTER
Unable to add on pt today and MD not in clinic tomorrow. Likely muscle strain of chest wall but with overall medical state and smoking hx, risk for rib fracture also possible. Exam describing crackles in lung and SOB with edema  so also need to assess for CHF, pneumonia, other. Would have pt seen in clinic tomorrow by any  [facility] partner with opening or other [facility] provider with opening

## 2024-06-12 NOTE — TELEPHONE ENCOUNTER
Called and spoke with pt. Relayed providers message from below. Assisted with scheduling pt. Pt agrees to plan.

## 2024-06-12 NOTE — LETTER
[facility] CARE COORDINATION  [address] [facility] [address]    June 12, 2024    Shriley Hendricks  [address]  [place] [facility] [address]      Dear Shirley,    I am a clinic care coordinator who works with Vasquez Benoit MD with the [facility]. I wanted to introduce myself and provide you with my contact information for you to be able to call me with any questions or concerns. Below is a description of clinic care coordination and how I can further assist you.       The clinic care coordination team is made up of a registered nurse, , financial resource worker and community health worker who understand the health care system. The goal of clinic care coordination is to help you manage your health and improve access to the health care system. Our team works alongside your provider to assist you in determining your health and social needs. We can help you obtain health care and community resources, providing you with necessary information and education. We can work with you through any barriers and develop a care plan that helps coordinate and strengthen the communication between you and your care team.  Our services are voluntary and are offered without charge to you personally.    Please feel free to contact me with any questions or concerns regarding care coordination and what we can offer.      We are focused on providing you with the highest-quality healthcare experience possible.    Sincerely,     Merissa Martinez, [facility]  Clinic Care Coordinator  [facility]  [phone]      Enclosed:         1.66

## 2024-06-12 NOTE — TELEPHONE ENCOUNTER
"[facility] home care RN calling in to report that when pt had a transfer on Monday of this week she feels she was \"stretched\" and now has L rib cage pain, and SOB. RN notes crackles in LLL as well as +3 edema in LLE.     Pt does have upcoming appt on 6/21/24. Please advise on next steps or if pt should be seen sooner.    Dorina Leal RN    "

## 2024-06-13 ENCOUNTER — OFFICE VISIT (OUTPATIENT)
Dept: INTERNAL MEDICINE | Facility: CLINIC | Age: 64
End: 2024-06-13
Payer: COMMERCIAL

## 2024-06-13 VITALS
HEART RATE: 61 BPM | RESPIRATION RATE: 18 BRPM | TEMPERATURE: 97.3 F | SYSTOLIC BLOOD PRESSURE: 140 MMHG | DIASTOLIC BLOOD PRESSURE: 60 MMHG | OXYGEN SATURATION: 93 %

## 2024-06-13 DIAGNOSIS — T87.40 AMPUTATION STUMP INFECTION (H): ICD-10-CM

## 2024-06-13 DIAGNOSIS — T14.8XXA WOUND INFECTION: ICD-10-CM

## 2024-06-13 DIAGNOSIS — Z46.89 ENCOUNTER FOR MANAGEMENT OF WOUND VAC: ICD-10-CM

## 2024-06-13 DIAGNOSIS — J41.0 SIMPLE CHRONIC BRONCHITIS (H): Primary | ICD-10-CM

## 2024-06-13 DIAGNOSIS — L08.9 WOUND INFECTION: ICD-10-CM

## 2024-06-13 LAB
ACANTHOCYTES BLD QL SMEAR: NORMAL
ANION GAP SERPL CALCULATED.3IONS-SCNC: 12 MMOL/L (ref 7–15)
AUER BODIES BLD QL SMEAR: NORMAL
BASO STIPL BLD QL SMEAR: NORMAL
BITE CELLS BLD QL SMEAR: NORMAL
BLISTER CELLS BLD QL SMEAR: NORMAL
BUN SERPL-MCNC: 65.4 MG/DL (ref 8–23)
BURR CELLS BLD QL SMEAR: NORMAL
CALCIUM SERPL-MCNC: 7.9 MG/DL (ref 8.8–10.2)
CHLORIDE SERPL-SCNC: 102 MMOL/L (ref 98–107)
CREAT SERPL-MCNC: 2.4 MG/DL (ref 0.51–0.95)
DACRYOCYTES BLD QL SMEAR: NORMAL
DEPRECATED HCO3 PLAS-SCNC: 16 MMOL/L (ref 22–29)
EGFRCR SERPLBLD CKD-EPI 2021: 22 ML/MIN/1.73M2
ELLIPTOCYTES BLD QL SMEAR: NORMAL
ERYTHROCYTE [DISTWIDTH] IN BLOOD BY AUTOMATED COUNT: 15.3 % (ref 10–15)
FRAGMENTS BLD QL SMEAR: NORMAL
GIANT PLATELETS BLD QL SMEAR: NORMAL
GLUCOSE SERPL-MCNC: 81 MG/DL (ref 70–99)
HCT VFR BLD AUTO: 31.2 % (ref 35–47)
HGB BLD-MCNC: 10.1 G/DL (ref 11.7–15.7)
HGB C CRYSTALS: NORMAL
HOWELL-JOLLY BOD BLD QL SMEAR: NORMAL
MAGNESIUM SERPL-MCNC: 1.9 MG/DL (ref 1.7–2.3)
MCH RBC QN AUTO: 32.2 PG (ref 26.5–33)
MCHC RBC AUTO-ENTMCNC: 32.4 G/DL (ref 31.5–36.5)
MCV RBC AUTO: 99 FL (ref 78–100)
NEUTS HYPERSEG BLD QL SMEAR: NORMAL
PATH REV: NORMAL
PHOSPHATE SERPL-MCNC: 6.6 MG/DL (ref 2.5–4.5)
PLAT MORPH BLD: NORMAL
PLATELET # BLD AUTO: 243 10E3/UL (ref 150–450)
POLYCHROMASIA BLD QL SMEAR: NORMAL
POTASSIUM SERPL-SCNC: 5.6 MMOL/L (ref 3.4–5.3)
RBC # BLD AUTO: 3.14 10E6/UL (ref 3.8–5.2)
RBC AGGLUT BLD QL: NORMAL
RBC MORPH BLD: NORMAL
ROULEAUX BLD QL SMEAR: NORMAL
SICKLE CELLS BLD QL SMEAR: NORMAL
SMUDGE CELLS BLD QL SMEAR: NORMAL
SODIUM SERPL-SCNC: 130 MMOL/L (ref 135–145)
SPHEROCYTES BLD QL SMEAR: NORMAL
STOMATOCYTES BLD QL SMEAR: NORMAL
TARGETS BLD QL SMEAR: NORMAL
TOXIC GRANULES BLD QL SMEAR: NORMAL
VARIANT LYMPHS BLD QL SMEAR: NORMAL
WBC # BLD AUTO: 3.2 10E3/UL (ref 4–11)

## 2024-06-13 PROCEDURE — 84100 ASSAY OF PHOSPHORUS: CPT | Performed by: INTERNAL MEDICINE

## 2024-06-13 PROCEDURE — 83735 ASSAY OF MAGNESIUM: CPT | Performed by: INTERNAL MEDICINE

## 2024-06-13 PROCEDURE — 85027 COMPLETE CBC AUTOMATED: CPT | Performed by: INTERNAL MEDICINE

## 2024-06-13 PROCEDURE — 99214 OFFICE O/P EST MOD 30 MIN: CPT | Performed by: INTERNAL MEDICINE

## 2024-06-13 PROCEDURE — 80048 BASIC METABOLIC PNL TOTAL CA: CPT | Performed by: INTERNAL MEDICINE

## 2024-06-13 PROCEDURE — 36415 COLL VENOUS BLD VENIPUNCTURE: CPT | Performed by: INTERNAL MEDICINE

## 2024-06-13 RX ORDER — OXYCODONE HYDROCHLORIDE 5 MG/1
5 TABLET ORAL 2 TIMES DAILY PRN
Qty: 6 TABLET | Refills: 0 | Status: ON HOLD | OUTPATIENT
Start: 2024-06-13 | End: 2024-07-08

## 2024-06-13 RX ORDER — RESPIRATORY SYNCYTIAL VIRUS VACCINE 120MCG/0.5
0.5 KIT INTRAMUSCULAR ONCE
Qty: 1 EACH | Refills: 0 | Status: CANCELLED | OUTPATIENT
Start: 2024-06-13 | End: 2024-06-13

## 2024-06-13 NOTE — PROGRESS NOTES
Clinic Care Coordination Contact  Follow Up Progress Note      Assessment: Outreached to patient to complete TCM call.   Patient reports her  passed away this morning. She is on the other line with the [employer].   She requests a call back.     Care Gaps:    Health Maintenance Due   Topic Date Due    MICROALBUMIN  Never done    URINE DRUG SCREEN  Never done    HEPATITIS B IMMUNIZATION (1 of 3 - Risk Dialysis 4-dose series) Never done    ZOSTER IMMUNIZATION (1 of 2) 08/27/2013    RSV VACCINE (Pregnancy & 60+) (1 - 1-dose 60+ series) Never done    DEXA  08/09/2020    COVID-19 Vaccine (3 - Pfizer risk series) 04/17/2021    MAMMO SCREENING  05/03/2021    MEDICARE ANNUAL WELLNESS VISIT  10/13/2023    PHQ-2 (once per calendar year)  01/01/2024    A1C  04/08/2024       Postponed to next outreach.     Intervention/Education provided during outreach: Patient/caregiver verbalized understanding and denies any additional questions or concerns at this time. RNCC engaged in [employer] communications during encounter.     Plan:   Will outreach to the patient again.     Care Coordinator will follow up in 1-2 business days.    Jaqueline Lynch, RN Clinic Care Coordinator  [facility] Clinics: [place], [place] (on-site Wednesdays), [facility] (on-site Thursdays) & [facility].  [email or web address]  Phone: [phone]

## 2024-06-13 NOTE — PROGRESS NOTES
Assessment & Plan     Amputation stump infection (H)  Stable, following with wound care with wound vac in place    Simple chronic bronchitis (H)  SOB with morning cough and minimal crackles on exam consistent with atelectasis. Will use incentive spirometry. No evidence of COPD exacerbation, pneumonia, or volume overload.    - Other Respiratory Supplies for DME - ONLY FOR DME            MED REC REQUIRED  Post Medication Reconciliation Status:  Discharge medications reconciled, continue medications without change        Subjective   Shirley is a 63 year old, presenting for the following health issues:  Pain (Left rib pain.) and Blood Draw (Pt wants to get hemoglobin checked.)      6/13/2024    12:52 PM   Additional Questions   Roomed by Kip   Accompanied by Sister     Left rib pain: She has difficulty with moving from anuja to car or to bed. Her brother has helped her regularly with moving to the care and thinks that these transfers have let to her pain. Pain has been getting better in the past several days. No pain with deep breathing currently.     Crackles/SOB: crackles noticed by home nurse. No fevers. Cough/SOB mostly in the morning, improved with       Pain    History of Present Illness       Reason for visit:  Left rib pain and crackle, Fluid in lungs referred by home care nurse.  Symptom onset:  3-7 days ago  Symptoms include:  Hurts when you breath in lung.  Symptom intensity:  Mild  Symptom progression:  Improving  Had these symptoms before:  Yes  Has tried/received treatment for these symptoms:  Yes  What makes it worse:  No  What makes it better:  Siting up, taking tylenol and using inhaler.    She eats 2-3 servings of fruits and vegetables daily.She consumes 7 sweetened beverage(s) daily.She exercises with enough effort to increase her heart rate 9 or less minutes per day.  She exercises with enough effort to increase her heart rate 3 or less days per week.   She is taking medications regularly.          Hospital Follow-up Visit:    Hospital/Nursing Home/IP Rehab Facility: [facility]  Date of Admission: 3/29/24  Date of Discharge: 6/08/2024  Reason(s) for Admission: Anxiety, Arterial oclussion;Wound of left leg  Was the patient in the ICU or did the patient experience delirium during hospitalization?  Yes       Do you have any other stressors you would like to discuss with your provider? Grief or Loss and Health Concerns    Problems taking medications regularly:  None  Medication changes since discharge: None  Problems adhering to non-medication therapy:  None    Summary of hospitalization:  [facility] discharge summary reviewed  Diagnostic Tests/Treatments reviewed.  Follow up needed: none  Other Healthcare Providers Involved in Patient s Care:          wound care  Update since discharge: stable.         Plan of care communicated with patient and caregiver                     Objective    BP (!) 140/60   Pulse 61   Temp 97.3  F (36.3  C) (Temporal)   Resp 18   LMP  (LMP Unknown)   SpO2 93%   There is no height or weight on file to calculate BMI.  Physical Exam   GENERAL: alert and no distress  RESP: minimal crackles at the left base  CV: regular rate and rhythm, normal S1 S2, no S3 or S4, no murmur, click or rub, no peripheral edema  MS: right AKA with wound vac in place            Signed Electronically by: Rose Dias MD

## 2024-06-14 NOTE — PROGRESS NOTES
Clinic Care Coordination Contact  Transitions of Care Outreach  Chief Complaint   Patient presents with    Clinic Care Coordination - Post Hospital       Most Recent Admission Date: 5/29/2024   Most Recent Admission Diagnosis:      Most Recent Discharge Date: 6/8/2024   Most Recent Discharge Diagnosis: Encounter for management of wound VAC - Z46.89  Amputation stump infection (H) - T87.40  Anxiety - F41.9  Benign essential hypertension - I10  Chronic allergic rhinitis due to animal hair and dander - J30.81  Peripheral vascular disease (H24) - I73.9  Gastroesophageal reflux disease with esophagitis, unspecified whether hemorrhage - K21.00  Chronic obstructive pulmonary disease, unspecified COPD type (H) - J44.9  S/P AKA (above knee amputation) unilateral, right (H) - Z89.611  PAD (peripheral artery disease) (H24) - I73.9  Fecal incontinence alternating with constipation - R15.9, K59.00  Dialysis patient (H24) - Z99.2  Tobacco use disorder - F17.200  Nausea - R11.0  Hyperlipidemia LDL goal <70 - E78.5  Renal failure, unspecified chronicity - N19  Wound infection - T14.8XXA, L08.9  Primary hypertension - I10     Transitions of Care Assessment    Discharge Assessment  How are you doing now that you are home?: Doing good. Had home care RN come in today.  How are your symptoms? (Red Flag symptoms escalate to triage hotline per guidelines): Improved  Do you know how to contact your clinic care team if you have future questions or changes to your health status? : Yes  Does the patient have their discharge instructions? : Yes  Does the patient have questions regarding their discharge instructions? : No  Were you started on any new medications or were there changes to any of your previous medications? : Yes  Does the patient have all of their medications?: Yes  Do you have questions regarding any of your medications? : No  Do you have all of your needed medical supplies or equipment (DME)?  (i.e. oxygen tank, CPAP, cane,  etc.): Yes         Post-op (Clinicians Only)  Did the patient have surgery or a procedure: Yes  Incision: other (wound vac)  Drainage: Yes  Drainage Color: serosanguinous (wound vac)  Incision Drainage Amount: light (wound vac)  Bleeding: scant  Fever: No  Chills: No  Redness: No  Warmth: No  Swelling: No  Incision site pain: Yes (managed with oxycodone and/or tylenol)  Closure: other (wound vac)  Pressure Reduction Devices: other (see comments) (N/A)  Eating & Drinking: eating and drinking without complaints/concerns  PO Intake: other (gluten free)  Bowel Function: normal  Date of last BM: 06/13/24  Urinary Status: voiding without complaint/concerns    Care Management   Clinic Care Coordination - COPD Clinical Pathway     COPD Symptoms:    Shortness of breath:: No  Cough: Yes  Is your cough:: Productive (describe in Comment) (clear with scant brown. per her normal.)  Wheezing: Yes (rare. coughs and resolves.)  Anxiety: No  New, different, or worsening chest pain? : No  Chills: No  Fever: No  Fatigue:: Yes, worsening (also lost her , so she has a lot of things to do.)  Overall your COPD symptoms are (GOAL):: Stable    COPD Management:   Taking COPD medications as prescribed?: Yes  Are your medications effective in controlling your symptoms?: Yes  Taking steroids for COPD?: No  Do you have a pulse oximeter?: No  SpO2 (Patient Reported): 97 (home care RN took today)  Are you using oxygen?: No  Resources reviewed and provided: Nurse triage line    Follow up Plan     Discharge Follow-Up  Discharge follow up appointment scheduled in alignment with recommended follow up timeframe or Transitions of Risk Category? (Low = within 30 days; Moderate= within 14 days; High= within 7 days): Yes  Discharge Follow Up Appointment Date: 06/13/24  Discharge Follow Up Appointment Scheduled with?: Primary Care Provider    Future Appointments   Date Time Provider Department Center   6/21/2024  9:00 AM Vasquez Benoit MD [facility]    7/1/2024 11:30 AM Kaylee Liu NP [facility]       Outpatient Plan as outlined on AVS reviewed with patient.    For any urgent concerns, please contact our 24 hour nurse triage line: [phone] ([phone])       Jaqueline Lynch RN Clinic Care Coordinator  [facility] Clinics: [place], [facility] (on-site Wednesdays), [facility] (on-site Thursdays) & [facility].  [email or web address]  Phone: [phone]

## 2024-06-18 DIAGNOSIS — K21.00 GASTROESOPHAGEAL REFLUX DISEASE WITH ESOPHAGITIS WITHOUT HEMORRHAGE: ICD-10-CM

## 2024-06-19 ENCOUNTER — MEDICAL CORRESPONDENCE (OUTPATIENT)
Dept: HEALTH INFORMATION MANAGEMENT | Facility: CLINIC | Age: 64
End: 2024-06-19
Payer: COMMERCIAL

## 2024-06-20 RX ORDER — FAMOTIDINE 20 MG/1
20 TABLET, FILM COATED ORAL 2 TIMES DAILY
Qty: 60 TABLET | Refills: 1 | Status: ON HOLD | OUTPATIENT
Start: 2024-06-20 | End: 2024-07-08

## 2024-06-20 NOTE — TELEPHONE ENCOUNTER
Call pt.  Omeprazole  was stopped during one of hr hospitalizations recently. Will potentially lessen the effect of pt's current Clopidogrel/Plavix use on vascular status. Pt to therefore remain off opf Omeprazole and try using Famotidine 20mg tab, ` tab BID for acid reflux issues. RX sent to pharmacy

## 2024-06-21 ENCOUNTER — APPOINTMENT (OUTPATIENT)
Dept: CT IMAGING | Facility: CLINIC | Age: 64
DRG: 205 | End: 2024-06-21
Attending: EMERGENCY MEDICINE
Payer: COMMERCIAL

## 2024-06-21 ENCOUNTER — HOSPITAL ENCOUNTER (INPATIENT)
Facility: CLINIC | Age: 64
LOS: 17 days | Discharge: HOME HEALTH | DRG: 205 | End: 2024-07-08
Attending: EMERGENCY MEDICINE | Admitting: HOSPITALIST
Payer: COMMERCIAL

## 2024-06-21 ENCOUNTER — APPOINTMENT (OUTPATIENT)
Dept: ULTRASOUND IMAGING | Facility: CLINIC | Age: 64
DRG: 205 | End: 2024-06-21
Attending: HOSPITALIST
Payer: COMMERCIAL

## 2024-06-21 ENCOUNTER — TELEPHONE (OUTPATIENT)
Dept: INTERNAL MEDICINE | Facility: CLINIC | Age: 64
End: 2024-06-21

## 2024-06-21 ENCOUNTER — OFFICE VISIT (OUTPATIENT)
Dept: INTERNAL MEDICINE | Facility: CLINIC | Age: 64
End: 2024-06-21
Payer: COMMERCIAL

## 2024-06-21 ENCOUNTER — ANCILLARY PROCEDURE (OUTPATIENT)
Dept: GENERAL RADIOLOGY | Facility: CLINIC | Age: 64
End: 2024-06-21
Attending: INTERNAL MEDICINE
Payer: COMMERCIAL

## 2024-06-21 DIAGNOSIS — R06.02 SOB (SHORTNESS OF BREATH): ICD-10-CM

## 2024-06-21 DIAGNOSIS — J90 PLEURAL EFFUSION: Primary | ICD-10-CM

## 2024-06-21 DIAGNOSIS — J90 PLEURAL EFFUSION ON LEFT: ICD-10-CM

## 2024-06-21 DIAGNOSIS — I21.4 NSTEMI (NON-ST ELEVATED MYOCARDIAL INFARCTION) (H): ICD-10-CM

## 2024-06-21 DIAGNOSIS — L89.159 DECUBITUS ULCER OF COCCYGEAL REGION, UNSPECIFIED ULCER STAGE: ICD-10-CM

## 2024-06-21 DIAGNOSIS — J44.9 CHRONIC OBSTRUCTIVE PULMONARY DISEASE, UNSPECIFIED COPD TYPE (H): ICD-10-CM

## 2024-06-21 DIAGNOSIS — I10 PRIMARY HYPERTENSION: ICD-10-CM

## 2024-06-21 DIAGNOSIS — T87.40 AMPUTATION STUMP INFECTION (H): ICD-10-CM

## 2024-06-21 DIAGNOSIS — N18.4 CKD (CHRONIC KIDNEY DISEASE) STAGE 4, GFR 15-29 ML/MIN (H): ICD-10-CM

## 2024-06-21 DIAGNOSIS — F17.219 CIGARETTE NICOTINE DEPENDENCE WITH NICOTINE-INDUCED DISORDER: ICD-10-CM

## 2024-06-21 DIAGNOSIS — J90 PLEURAL EFFUSION, LEFT: ICD-10-CM

## 2024-06-21 DIAGNOSIS — D64.9 ANEMIA, UNSPECIFIED TYPE: ICD-10-CM

## 2024-06-21 DIAGNOSIS — E11.51 TYPE 2 DIABETES MELLITUS WITH DIABETIC PERIPHERAL ANGIOPATHY WITHOUT GANGRENE, WITHOUT LONG-TERM CURRENT USE OF INSULIN (H): ICD-10-CM

## 2024-06-21 DIAGNOSIS — E87.5 HYPERKALEMIA: ICD-10-CM

## 2024-06-21 DIAGNOSIS — Z89.611 S/P AKA (ABOVE KNEE AMPUTATION) UNILATERAL, RIGHT (H): ICD-10-CM

## 2024-06-21 DIAGNOSIS — R09.02 HYPOXIA: ICD-10-CM

## 2024-06-21 DIAGNOSIS — J98.11 ATELECTASIS OF LEFT LUNG: ICD-10-CM

## 2024-06-21 DIAGNOSIS — L89.159 PRESSURE INJURY OF SKIN OF COCCYGEAL REGION: ICD-10-CM

## 2024-06-21 LAB
ALBUMIN SERPL BCG-MCNC: 2.4 G/DL (ref 3.5–5.2)
ALP SERPL-CCNC: 63 U/L (ref 40–150)
ALT SERPL W P-5'-P-CCNC: 28 U/L (ref 0–50)
ANION GAP SERPL CALCULATED.3IONS-SCNC: 11 MMOL/L (ref 7–15)
ANION GAP SERPL CALCULATED.3IONS-SCNC: 12 MMOL/L (ref 7–15)
ANION GAP SERPL CALCULATED.3IONS-SCNC: 12 MMOL/L (ref 7–15)
AST SERPL W P-5'-P-CCNC: 27 U/L (ref 0–45)
ATRIAL RATE - MUSE: 58 BPM
BASOPHILS # BLD AUTO: 0.1 10E3/UL (ref 0–0.2)
BASOPHILS NFR BLD AUTO: 2 %
BILIRUB SERPL-MCNC: <0.2 MG/DL
BUN SERPL-MCNC: 87.4 MG/DL (ref 8–23)
BUN SERPL-MCNC: 90.2 MG/DL (ref 8–23)
BUN SERPL-MCNC: 90.3 MG/DL (ref 8–23)
CALCIUM SERPL-MCNC: 7.7 MG/DL (ref 8.8–10.2)
CALCIUM SERPL-MCNC: 8.1 MG/DL (ref 8.8–10.2)
CALCIUM SERPL-MCNC: 8.5 MG/DL (ref 8.8–10.2)
CHLORIDE SERPL-SCNC: 101 MMOL/L (ref 98–107)
CHLORIDE SERPL-SCNC: 99 MMOL/L (ref 98–107)
CHLORIDE SERPL-SCNC: 99 MMOL/L (ref 98–107)
CREAT SERPL-MCNC: 3.01 MG/DL (ref 0.51–0.95)
CREAT SERPL-MCNC: 3.06 MG/DL (ref 0.51–0.95)
CREAT SERPL-MCNC: 3.07 MG/DL (ref 0.51–0.95)
DEPRECATED HCO3 PLAS-SCNC: 17 MMOL/L (ref 22–29)
DEPRECATED HCO3 PLAS-SCNC: 17 MMOL/L (ref 22–29)
DEPRECATED HCO3 PLAS-SCNC: 18 MMOL/L (ref 22–29)
DIASTOLIC BLOOD PRESSURE - MUSE: NORMAL MMHG
EGFRCR SERPLBLD CKD-EPI 2021: 16 ML/MIN/1.73M2
EGFRCR SERPLBLD CKD-EPI 2021: 16 ML/MIN/1.73M2
EGFRCR SERPLBLD CKD-EPI 2021: 17 ML/MIN/1.73M2
EOSINOPHIL # BLD AUTO: 0.2 10E3/UL (ref 0–0.7)
EOSINOPHIL NFR BLD AUTO: 4 %
ERYTHROCYTE [DISTWIDTH] IN BLOOD BY AUTOMATED COUNT: 14.5 % (ref 10–15)
ERYTHROCYTE [DISTWIDTH] IN BLOOD BY AUTOMATED COUNT: 14.6 % (ref 10–15)
GLUCOSE BLDC GLUCOMTR-MCNC: 109 MG/DL (ref 70–99)
GLUCOSE BLDC GLUCOMTR-MCNC: 113 MG/DL (ref 70–99)
GLUCOSE BLDC GLUCOMTR-MCNC: 116 MG/DL (ref 70–99)
GLUCOSE BLDC GLUCOMTR-MCNC: 118 MG/DL (ref 70–99)
GLUCOSE BLDC GLUCOMTR-MCNC: 119 MG/DL (ref 70–99)
GLUCOSE BLDC GLUCOMTR-MCNC: 122 MG/DL (ref 70–99)
GLUCOSE BLDC GLUCOMTR-MCNC: 128 MG/DL (ref 70–99)
GLUCOSE BLDC GLUCOMTR-MCNC: 146 MG/DL (ref 70–99)
GLUCOSE BLDC GLUCOMTR-MCNC: 167 MG/DL (ref 70–99)
GLUCOSE BODY FLUID SOURCE: NORMAL
GLUCOSE FLD-MCNC: 125 MG/DL
GLUCOSE SERPL-MCNC: 114 MG/DL (ref 70–99)
GLUCOSE SERPL-MCNC: 116 MG/DL (ref 70–99)
GLUCOSE SERPL-MCNC: 97 MG/DL (ref 70–99)
HBA1C MFR BLD: 4.6 % (ref 0–5.6)
HCT VFR BLD AUTO: 26.5 % (ref 35–47)
HCT VFR BLD AUTO: 27.3 % (ref 35–47)
HGB BLD-MCNC: 8.4 G/DL (ref 11.7–15.7)
HGB BLD-MCNC: 8.6 G/DL (ref 11.7–15.7)
IMM GRANULOCYTES # BLD: 0 10E3/UL
IMM GRANULOCYTES NFR BLD: 0 %
INTERPRETATION ECG - MUSE: NORMAL
IRON BINDING CAPACITY (ROCHE): 202 UG/DL (ref 240–430)
IRON SATN MFR SERPL: 28 % (ref 15–46)
IRON SERPL-MCNC: 57 UG/DL (ref 37–145)
LD BODY BODY FLUID SOURCE: NORMAL
LDH FLD L TO P-CCNC: 49 U/L
LDH SERPL L TO P-CCNC: 135 U/L (ref 0–250)
LYMPHOCYTES # BLD AUTO: 1.6 10E3/UL (ref 0.8–5.3)
LYMPHOCYTES NFR BLD AUTO: 35 %
MCH RBC QN AUTO: 30.3 PG (ref 26.5–33)
MCH RBC QN AUTO: 30.9 PG (ref 26.5–33)
MCHC RBC AUTO-ENTMCNC: 31.5 G/DL (ref 31.5–36.5)
MCHC RBC AUTO-ENTMCNC: 31.7 G/DL (ref 31.5–36.5)
MCV RBC AUTO: 96 FL (ref 78–100)
MCV RBC AUTO: 97 FL (ref 78–100)
MONOCYTES # BLD AUTO: 0.3 10E3/UL (ref 0–1.3)
MONOCYTES NFR BLD AUTO: 7 %
NEUTROPHILS # BLD AUTO: 2.5 10E3/UL (ref 1.6–8.3)
NEUTROPHILS NFR BLD AUTO: 53 %
NRBC # BLD AUTO: 0 10E3/UL
NRBC BLD AUTO-RTO: 0 /100
NT-PROBNP SERPL-MCNC: ABNORMAL PG/ML (ref 0–900)
P AXIS - MUSE: -1 DEGREES
PH BODY FLUID SOURCE: NORMAL
PH FLD: 8 PH
PLATELET # BLD AUTO: 248 10E3/UL (ref 150–450)
PLATELET # BLD AUTO: 265 10E3/UL (ref 150–450)
POTASSIUM SERPL-SCNC: 5.9 MMOL/L (ref 3.4–5.3)
POTASSIUM SERPL-SCNC: 6.4 MMOL/L (ref 3.4–5.3)
POTASSIUM SERPL-SCNC: 6.8 MMOL/L (ref 3.4–5.3)
POTASSIUM SERPL-SCNC: 7 MMOL/L (ref 3.4–5.3)
POTASSIUM SERPL-SCNC: 7.5 MMOL/L (ref 3.4–5.3)
PR INTERVAL - MUSE: 138 MS
PROT FLD-MCNC: 1.7 G/DL
PROT SERPL-MCNC: 5.2 G/DL (ref 6.4–8.3)
PROT SERPL-MCNC: 5.6 G/DL (ref 6.4–8.3)
PROTEIN BODY FLUID SOURCE: NORMAL
QRS DURATION - MUSE: 76 MS
QT - MUSE: 452 MS
QTC - MUSE: 511 MS
R AXIS - MUSE: -4 DEGREES
RBC # BLD AUTO: 2.72 10E6/UL (ref 3.8–5.2)
RBC # BLD AUTO: 2.84 10E6/UL (ref 3.8–5.2)
SODIUM SERPL-SCNC: 128 MMOL/L (ref 135–145)
SODIUM SERPL-SCNC: 129 MMOL/L (ref 135–145)
SODIUM SERPL-SCNC: 129 MMOL/L (ref 135–145)
SYSTOLIC BLOOD PRESSURE - MUSE: NORMAL MMHG
T AXIS - MUSE: 67 DEGREES
TROPONIN T SERPL HS-MCNC: 156 NG/L
TROPONIN T SERPL HS-MCNC: 174 NG/L
TROPONIN T SERPL HS-MCNC: 177 NG/L
VENTRICULAR RATE- MUSE: 77 BPM
WBC # BLD AUTO: 4.7 10E3/UL (ref 4–11)
WBC # BLD AUTO: 5 10E3/UL (ref 4–11)

## 2024-06-21 PROCEDURE — 85025 COMPLETE CBC W/AUTO DIFF WBC: CPT | Performed by: INTERNAL MEDICINE

## 2024-06-21 PROCEDURE — 250N000009 HC RX 250: Performed by: RADIOLOGY

## 2024-06-21 PROCEDURE — 83880 ASSAY OF NATRIURETIC PEPTIDE: CPT | Performed by: EMERGENCY MEDICINE

## 2024-06-21 PROCEDURE — 71250 CT THORAX DX C-: CPT

## 2024-06-21 PROCEDURE — 250N000011 HC RX IP 250 OP 636: Mod: JZ | Performed by: EMERGENCY MEDICINE

## 2024-06-21 PROCEDURE — 88305 TISSUE EXAM BY PATHOLOGIST: CPT | Mod: TC | Performed by: HOSPITALIST

## 2024-06-21 PROCEDURE — 83615 LACTATE (LD) (LDH) ENZYME: CPT | Performed by: HOSPITALIST

## 2024-06-21 PROCEDURE — 84155 ASSAY OF PROTEIN SERUM: CPT | Performed by: HOSPITALIST

## 2024-06-21 PROCEDURE — 96375 TX/PRO/DX INJ NEW DRUG ADDON: CPT

## 2024-06-21 PROCEDURE — 96368 THER/DIAG CONCURRENT INF: CPT

## 2024-06-21 PROCEDURE — 83036 HEMOGLOBIN GLYCOSYLATED A1C: CPT | Performed by: INTERNAL MEDICINE

## 2024-06-21 PROCEDURE — 87205 SMEAR GRAM STAIN: CPT | Performed by: HOSPITALIST

## 2024-06-21 PROCEDURE — 36415 COLL VENOUS BLD VENIPUNCTURE: CPT | Performed by: INTERNAL MEDICINE

## 2024-06-21 PROCEDURE — 250N000013 HC RX MED GY IP 250 OP 250 PS 637: Performed by: EMERGENCY MEDICINE

## 2024-06-21 PROCEDURE — 82945 GLUCOSE OTHER FLUID: CPT | Performed by: HOSPITALIST

## 2024-06-21 PROCEDURE — 82435 ASSAY OF BLOOD CHLORIDE: CPT | Performed by: EMERGENCY MEDICINE

## 2024-06-21 PROCEDURE — 258N000003 HC RX IP 258 OP 636: Mod: JZ | Performed by: EMERGENCY MEDICINE

## 2024-06-21 PROCEDURE — 120N000013 HC R&B IMCU

## 2024-06-21 PROCEDURE — 0W9B3ZZ DRAINAGE OF LEFT PLEURAL CAVITY, PERCUTANEOUS APPROACH: ICD-10-PCS | Performed by: RADIOLOGY

## 2024-06-21 PROCEDURE — 96376 TX/PRO/DX INJ SAME DRUG ADON: CPT

## 2024-06-21 PROCEDURE — 82962 GLUCOSE BLOOD TEST: CPT

## 2024-06-21 PROCEDURE — 85027 COMPLETE CBC AUTOMATED: CPT | Performed by: EMERGENCY MEDICINE

## 2024-06-21 PROCEDURE — 99223 1ST HOSP IP/OBS HIGH 75: CPT | Performed by: HOSPITALIST

## 2024-06-21 PROCEDURE — 87075 CULTR BACTERIA EXCEPT BLOOD: CPT | Performed by: HOSPITALIST

## 2024-06-21 PROCEDURE — 87070 CULTURE OTHR SPECIMN AEROBIC: CPT | Performed by: HOSPITALIST

## 2024-06-21 PROCEDURE — 80048 BASIC METABOLIC PNL TOTAL CA: CPT | Performed by: INTERNAL MEDICINE

## 2024-06-21 PROCEDURE — 96365 THER/PROPH/DIAG IV INF INIT: CPT

## 2024-06-21 PROCEDURE — 99215 OFFICE O/P EST HI 40 MIN: CPT | Performed by: INTERNAL MEDICINE

## 2024-06-21 PROCEDURE — 84157 ASSAY OF PROTEIN OTHER: CPT | Performed by: HOSPITALIST

## 2024-06-21 PROCEDURE — 71046 X-RAY EXAM CHEST 2 VIEWS: CPT | Mod: TC | Performed by: RADIOLOGY

## 2024-06-21 PROCEDURE — 93005 ELECTROCARDIOGRAM TRACING: CPT

## 2024-06-21 PROCEDURE — 84484 ASSAY OF TROPONIN QUANT: CPT | Performed by: EMERGENCY MEDICINE

## 2024-06-21 PROCEDURE — 83986 ASSAY PH BODY FLUID NOS: CPT | Performed by: HOSPITALIST

## 2024-06-21 PROCEDURE — 84478 ASSAY OF TRIGLYCERIDES: CPT | Performed by: HOSPITALIST

## 2024-06-21 PROCEDURE — 96360 HYDRATION IV INFUSION INIT: CPT | Mod: 59

## 2024-06-21 PROCEDURE — 94640 AIRWAY INHALATION TREATMENT: CPT

## 2024-06-21 PROCEDURE — 99285 EMERGENCY DEPT VISIT HI MDM: CPT | Mod: 25

## 2024-06-21 PROCEDURE — 258N000001 HC RX 258: Performed by: EMERGENCY MEDICINE

## 2024-06-21 PROCEDURE — 83550 IRON BINDING TEST: CPT | Performed by: INTERNAL MEDICINE

## 2024-06-21 PROCEDURE — 89050 BODY FLUID CELL COUNT: CPT | Performed by: HOSPITALIST

## 2024-06-21 PROCEDURE — 80053 COMPREHEN METABOLIC PANEL: CPT | Performed by: EMERGENCY MEDICINE

## 2024-06-21 PROCEDURE — 250N000012 HC RX MED GY IP 250 OP 636 PS 637: Performed by: EMERGENCY MEDICINE

## 2024-06-21 PROCEDURE — 83540 ASSAY OF IRON: CPT | Performed by: INTERNAL MEDICINE

## 2024-06-21 PROCEDURE — 96366 THER/PROPH/DIAG IV INF ADDON: CPT

## 2024-06-21 PROCEDURE — 32555 ASPIRATE PLEURA W/ IMAGING: CPT

## 2024-06-21 PROCEDURE — 250N000011 HC RX IP 250 OP 636: Mod: JZ | Performed by: HOSPITALIST

## 2024-06-21 PROCEDURE — 36415 COLL VENOUS BLD VENIPUNCTURE: CPT | Performed by: EMERGENCY MEDICINE

## 2024-06-21 PROCEDURE — 84132 ASSAY OF SERUM POTASSIUM: CPT | Performed by: EMERGENCY MEDICINE

## 2024-06-21 PROCEDURE — 250N000009 HC RX 250: Performed by: EMERGENCY MEDICINE

## 2024-06-21 RX ORDER — ONDANSETRON 2 MG/ML
4 INJECTION INTRAMUSCULAR; INTRAVENOUS EVERY 30 MIN PRN
Status: DISCONTINUED | OUTPATIENT
Start: 2024-06-21 | End: 2024-06-21

## 2024-06-21 RX ORDER — ASPIRIN 81 MG/1
162 TABLET, CHEWABLE ORAL ONCE
Status: COMPLETED | OUTPATIENT
Start: 2024-06-21 | End: 2024-06-21

## 2024-06-21 RX ORDER — AMLODIPINE BESYLATE 10 MG/1
10 TABLET ORAL DAILY
Status: DISCONTINUED | OUTPATIENT
Start: 2024-06-22 | End: 2024-06-24

## 2024-06-21 RX ORDER — NICOTINE 21 MG/24HR
1 PATCH, TRANSDERMAL 24 HOURS TRANSDERMAL DAILY
Status: DISCONTINUED | OUTPATIENT
Start: 2024-06-22 | End: 2024-07-08 | Stop reason: HOSPADM

## 2024-06-21 RX ORDER — OXYCODONE HYDROCHLORIDE 5 MG/1
5 TABLET ORAL 2 TIMES DAILY PRN
Status: DISCONTINUED | OUTPATIENT
Start: 2024-06-21 | End: 2024-06-23

## 2024-06-21 RX ORDER — LIDOCAINE HYDROCHLORIDE 10 MG/ML
10 INJECTION, SOLUTION EPIDURAL; INFILTRATION; INTRACAUDAL; PERINEURAL ONCE
Status: COMPLETED | OUTPATIENT
Start: 2024-06-21 | End: 2024-06-21

## 2024-06-21 RX ORDER — AMOXICILLIN 250 MG
1 CAPSULE ORAL 2 TIMES DAILY PRN
Status: DISCONTINUED | OUTPATIENT
Start: 2024-06-21 | End: 2024-07-08 | Stop reason: HOSPADM

## 2024-06-21 RX ORDER — FLUTICASONE FUROATE AND VILANTEROL 200; 25 UG/1; UG/1
1 POWDER RESPIRATORY (INHALATION) DAILY
Status: DISCONTINUED | OUTPATIENT
Start: 2024-06-22 | End: 2024-07-08 | Stop reason: HOSPADM

## 2024-06-21 RX ORDER — CALCIUM GLUCONATE 94 MG/ML
1 INJECTION, SOLUTION INTRAVENOUS ONCE
Status: COMPLETED | OUTPATIENT
Start: 2024-06-21 | End: 2024-06-21

## 2024-06-21 RX ORDER — DEXTROSE MONOHYDRATE 25 G/50ML
25 INJECTION, SOLUTION INTRAVENOUS ONCE
Status: COMPLETED | OUTPATIENT
Start: 2024-06-21 | End: 2024-06-21

## 2024-06-21 RX ORDER — ONDANSETRON 2 MG/ML
4 INJECTION INTRAMUSCULAR; INTRAVENOUS EVERY 6 HOURS PRN
Status: DISCONTINUED | OUTPATIENT
Start: 2024-06-21 | End: 2024-07-08 | Stop reason: HOSPADM

## 2024-06-21 RX ORDER — ALBUTEROL SULFATE 0.83 MG/ML
5 SOLUTION RESPIRATORY (INHALATION) ONCE
Status: COMPLETED | OUTPATIENT
Start: 2024-06-21 | End: 2024-06-21

## 2024-06-21 RX ORDER — CLOPIDOGREL BISULFATE 75 MG/1
75 TABLET ORAL DAILY
Status: DISCONTINUED | OUTPATIENT
Start: 2024-06-22 | End: 2024-07-08 | Stop reason: HOSPADM

## 2024-06-21 RX ORDER — NALOXONE HYDROCHLORIDE 0.4 MG/ML
0.2 INJECTION, SOLUTION INTRAMUSCULAR; INTRAVENOUS; SUBCUTANEOUS
Status: DISCONTINUED | OUTPATIENT
Start: 2024-06-21 | End: 2024-07-08 | Stop reason: HOSPADM

## 2024-06-21 RX ORDER — METOPROLOL TARTRATE 1 MG/ML
5 INJECTION, SOLUTION INTRAVENOUS ONCE
Status: DISCONTINUED | OUTPATIENT
Start: 2024-06-21 | End: 2024-06-21

## 2024-06-21 RX ORDER — FUROSEMIDE 10 MG/ML
40 INJECTION INTRAMUSCULAR; INTRAVENOUS ONCE
Status: COMPLETED | OUTPATIENT
Start: 2024-06-21 | End: 2024-06-21

## 2024-06-21 RX ORDER — LIDOCAINE 40 MG/G
CREAM TOPICAL
Status: DISCONTINUED | OUTPATIENT
Start: 2024-06-21 | End: 2024-06-22

## 2024-06-21 RX ORDER — NALOXONE HYDROCHLORIDE 0.4 MG/ML
0.4 INJECTION, SOLUTION INTRAMUSCULAR; INTRAVENOUS; SUBCUTANEOUS
Status: DISCONTINUED | OUTPATIENT
Start: 2024-06-21 | End: 2024-07-08 | Stop reason: HOSPADM

## 2024-06-21 RX ORDER — POLYETHYLENE GLYCOL 3350 17 G/17G
17 POWDER, FOR SOLUTION ORAL DAILY
Status: DISCONTINUED | OUTPATIENT
Start: 2024-06-22 | End: 2024-07-08 | Stop reason: HOSPADM

## 2024-06-21 RX ORDER — SACCHAROMYCES BOULARDII 250 MG
250 CAPSULE ORAL 2 TIMES DAILY
Status: DISCONTINUED | OUTPATIENT
Start: 2024-06-22 | End: 2024-07-08 | Stop reason: HOSPADM

## 2024-06-21 RX ORDER — ONDANSETRON 4 MG/1
4 TABLET, ORALLY DISINTEGRATING ORAL EVERY 6 HOURS PRN
Status: DISCONTINUED | OUTPATIENT
Start: 2024-06-21 | End: 2024-07-08 | Stop reason: HOSPADM

## 2024-06-21 RX ORDER — DEXTROSE MONOHYDRATE 25 G/50ML
25-50 INJECTION, SOLUTION INTRAVENOUS
Status: DISCONTINUED | OUTPATIENT
Start: 2024-06-21 | End: 2024-06-22

## 2024-06-21 RX ORDER — AMOXICILLIN 250 MG
2 CAPSULE ORAL 2 TIMES DAILY PRN
Status: DISCONTINUED | OUTPATIENT
Start: 2024-06-21 | End: 2024-07-08 | Stop reason: HOSPADM

## 2024-06-21 RX ORDER — LIDOCAINE 40 MG/G
CREAM TOPICAL
Status: DISCONTINUED | OUTPATIENT
Start: 2024-06-21 | End: 2024-06-23

## 2024-06-21 RX ORDER — CALCIUM CARBONATE 500 MG/1
1000 TABLET, CHEWABLE ORAL 4 TIMES DAILY PRN
Status: DISCONTINUED | OUTPATIENT
Start: 2024-06-21 | End: 2024-07-08 | Stop reason: HOSPADM

## 2024-06-21 RX ORDER — BUDESONIDE AND FORMOTEROL FUMARATE DIHYDRATE 160; 4.5 UG/1; UG/1
2 AEROSOL RESPIRATORY (INHALATION)
Qty: 30.6 G | Refills: 3 | Status: SHIPPED | OUTPATIENT
Start: 2024-06-21

## 2024-06-21 RX ORDER — NICOTINE POLACRILEX 4 MG
15-30 LOZENGE BUCCAL
Status: DISCONTINUED | OUTPATIENT
Start: 2024-06-21 | End: 2024-06-22

## 2024-06-21 RX ORDER — ROSUVASTATIN CALCIUM 10 MG/1
10 TABLET, COATED ORAL AT BEDTIME
Status: DISCONTINUED | OUTPATIENT
Start: 2024-06-21 | End: 2024-07-08 | Stop reason: HOSPADM

## 2024-06-21 RX ADMIN — ONDANSETRON 4 MG: 2 INJECTION INTRAMUSCULAR; INTRAVENOUS at 21:53

## 2024-06-21 RX ADMIN — SODIUM ZIRCONIUM CYCLOSILICATE 10 G: 5 POWDER, FOR SUSPENSION ORAL at 19:02

## 2024-06-21 RX ADMIN — ASPIRIN 81 MG CHEWABLE TABLET 162 MG: 81 TABLET CHEWABLE at 15:41

## 2024-06-21 RX ADMIN — CALCIUM GLUCONATE 1 G: 98 INJECTION, SOLUTION INTRAVENOUS at 17:04

## 2024-06-21 RX ADMIN — SODIUM CHLORIDE 6 UNITS: 9 INJECTION, SOLUTION INTRAVENOUS at 15:59

## 2024-06-21 RX ADMIN — FUROSEMIDE 40 MG: 10 INJECTION, SOLUTION INTRAMUSCULAR; INTRAVENOUS at 15:45

## 2024-06-21 RX ADMIN — ALBUTEROL SULFATE 5 MG: 2.5 SOLUTION RESPIRATORY (INHALATION) at 15:44

## 2024-06-21 RX ADMIN — CALCIUM GLUCONATE 1 G: 98 INJECTION, SOLUTION INTRAVENOUS at 15:41

## 2024-06-21 RX ADMIN — DEXTROSE MONOHYDRATE 25 G: 25 INJECTION, SOLUTION INTRAVENOUS at 15:44

## 2024-06-21 RX ADMIN — SODIUM CHLORIDE 1000 ML: 9 INJECTION, SOLUTION INTRAVENOUS at 17:50

## 2024-06-21 RX ADMIN — DEXTROSE MONOHYDRATE 300 ML: 100 INJECTION, SOLUTION INTRAVENOUS at 15:59

## 2024-06-21 RX ADMIN — LIDOCAINE HYDROCHLORIDE 10 ML: 10 INJECTION, SOLUTION EPIDURAL; INFILTRATION; INTRACAUDAL; PERINEURAL at 22:32

## 2024-06-21 ASSESSMENT — ACTIVITIES OF DAILY LIVING (ADL)
ADLS_ACUITY_SCORE: 40

## 2024-06-21 NOTE — TELEPHONE ENCOUNTER
Lab called with a critical result.             Notified PCP.  Per chart review, pt is currently in the ER.     Thank you,  Nehal Rodgers RN

## 2024-06-21 NOTE — TELEPHONE ENCOUNTER
MD did not get potassium result until later this afternoon after pt had already been sent to ER for large pleural effusion and ER has repeated potassium labs to  confirm and treating

## 2024-06-21 NOTE — ED NOTES
DATE/TIME OF CALL RECEIVED FROM LAB:  06/21/24 at 4:40 PM   LAB TEST:  potassium  LAB VALUE:  7.5  PROVIDER NOTIFIED?: Yes  PROVIDER NAME: Dr. Braswell  DATE/TIME LAB VALUE REPORTED TO PROVIDER: 3451  MECHANISM OF PROVIDER NOTIFICATION: Page  PROVIDER RESPONSE:

## 2024-06-21 NOTE — ED PROVIDER NOTES
Emergency Department Note      History of Present Illness     Chief Complaint  Shortness of Breath    HPI  Shirley Hendricks is a 63 year old female who presents to the emergency room with what appears to be shortness of breath.  She had an x-ray done earlier today that showed essentially a complete whiteout of her left lung, and notably she was discharged after prolonged hospitalization and amputation about 10 to 14 days ago.  Her sister has been taking care of her, but notes that over the last 3 days she has become significantly more swollen in her leg, and much more short of breath.  Denies any fevers, does endorse significant cough and orthopnea.      Independent Historian  Yes patient's sister is at the bedside and confirms the above history.    Review of External Notes  Yes I reviewed the office visit from earlier today with the patient was seen by her internist for shortness of breath and type 2 diabetes as well as an abnormal x-ray and referred here to the ER.      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    Embolism and thrombosis of unspecified artery (H) 08/2000    Gastroesophageal reflux disease     Hyperlipidaemia     Hypertension     Lupus (H)     Mild major depression (H24) 11/07/2017    Myocardial infarction (H)     Osteoarthrosis, unspecified whether generalized or localized, unspecified site     PAD (peripheral artery disease) (H24)     Peripheral vascular disease, unspecified (H24) 12/2000    Post-menopausal     Reflux esophagitis 02/2004    SBO (small bowel obstruction) (H) 08/10/2021    SVT (supraventricular tachycardia) (H24)     Thrombocytopenia (H24)     Uncomplicated asthma     Vitamin C deficiency 08/12/2018       Medications  acetaminophen  (TYLENOL) 500 MG tablet  amLODIPine (NORVASC) 10 MG tablet  budesonide-formoterol (SYMBICORT) 160-4.5 MCG/ACT Inhaler  bumetanide (BUMEX) 2 MG tablet  carvedilol (COREG) 12.5 MG tablet  cetirizine (ZYRTEC) 5 MG tablet  clopidogrel (PLAVIX) 75 MG tablet  diphenoxylate-atropine (LOMOTIL) 2.5-0.025 MG tablet  famotidine (PEPCID) 20 MG tablet  gabapentin (NEURONTIN) 100 MG capsule  hydrALAZINE (APRESOLINE) 10 MG tablet  miconazole (MICATIN) 2 % external powder  nicotine (NICODERM CQ) 14 MG/24HR 24 hr patch  nicotine (NICODERM CQ) 14 MG/24HR 24 hr patch  nitroGLYcerin (NITROSTAT) 0.4 MG sublingual tablet  ondansetron (ZOFRAN ODT) 4 MG ODT tab  oxyCODONE (ROXICODONE) 5 MG tablet  polyethylene glycol (MIRALAX) 17 GM/Dose powder  rosuvastatin (CRESTOR) 10 MG tablet  saccharomyces boulardii (FLORASTOR) 250 MG capsule  silver sulfADIAZINE (SILVADENE) 1 % external cream  wound support modular (EXPEDITE) LIQD bottle        Surgical History   Past Surgical History:   Procedure Laterality Date    AMPUTATE LEG ABOVE KNEE N/A 4/1/2024    Procedure: AMPUTATION, ABOVE KNEE right leg with wound vac dressing, excision of anteriotibial bypass graft, closure of (previous interventional radiology) left groin incision;  Surgeon: José Luis Hernandez MD;  Location:  OR    AMPUTATE LEG ABOVE KNEE Right 4/9/2024    Procedure: COMPLETION RIGHT ABOVE KNEE AMPUTATION;  Surgeon: José Luis Hernandez MD;  Location:  OR    ANGIOGRAM      ANGIOGRAM Right 6/23/2021    Procedure: RIGHT LOWER EXTREMITY ANGIOGRAM WITH LEFT BRACHIAL ARTERY CUTDOWN;  Surgeon: José Luis Hernandez MD;  Location:  OR    APPLY WOUND VAC Right 5/16/2024    Procedure: PLACEMENT OF WOUND VAC TO RIGHT ABOVE KNEE AMPUTATION;  Surgeon: Tay Enciso MD;  Location:  OR    APPLY WOUND VAC N/A 5/20/2024    Procedure: AND PLACEMENT OF WOUND VAC;  Surgeon: José Luis Hernandez MD;  Location: [facility] OR    BIOPSY MASS NECK Right 10/11/2023    Procedure: Right Parotid  Biopsy;  Surgeon: Randal Mendoza MD;  Location:  OR    BYPASS GRAFT FEMOROPOPLITEAL Right 09/23/2020    Procedure: RIGHT FEMORAL GRAFT TO ABOVE-KNEE POPLITEAL BYPASS USING CADAVERIC SUPERFICIAL FEMORAL ARTERY;  Surgeon: Chadwick Lozano MD;  Location:  OR    BYPASS GRAFT FEMOROPOPLITEAL Right 2/15/2022    Procedure: RIGHT SUPERFICIAL FEMORAL ARTERY GRAFT TO BELOW KNEE POPLITEAL BYPASS WITH CADAVERIC CRYOLIFE  FEMORAL-POPLITEAL ARTERY SIZE: OUTER DIAMETER: 7MM - 6MM;  Surgeon: Chadwick Lozano;  Location: [facility]    BYPASS GRAFT FEMOROPOPLITEAL Right 5/26/2023    Procedure: RIGHT DISTAL SUPERFICIAL FEMORAL GRAFT TO ANTERIOR TIBIAL ARTERY WITH 26 CENTIMETER CADAVERIC SUPERFICIAL FEMORAL ARTERY GRAFT;  Surgeon: Chadwick Lozano MD;  Location:  OR    BYPASS GRAFT FEMOROPOPLITEAL Right 10/11/2023    Procedure: RIGHT FEMORAL TO POPLITEAL GRAFT THROMBECTOMY;  Surgeon: Chadwick Lozano MD;  Location:  OR    BYPASS GRAFT INSITU FEMOROPOPLITEAL Right 7/7/2021    Procedure: CREATION RIGHT FEMORAL TO POPLITEAL ARTERIAL BYPASS USING INSITU VEIN GRAFT;  Surgeon: José Luis Hernandez MD;  Location:  OR    CARDIAC CATHERIZATION  09/03/2009    multivessel CAD without target lesions, med mgmt indicated, preserved ef    CARPAL TUNNEL RELEASE RT/LT Right 05/20/2021    COLONOSCOPY N/A 12/12/2023    Procedure: Colonoscopy;  Surgeon: Corey Hoffman MD;  Location:  GI    COLONOSCOPY N/A 12/14/2023    Procedure: Colonoscopy;  Surgeon: Corey Hoffman MD;  Location:  GI    CV CORONARY ANGIOGRAM N/A 10/4/2023    Procedure: Coronary Angiogram;  Surgeon: Rogelio Ricks MD;  Location:  [facility]    CV PCI N/A 10/4/2023    Procedure: Percutaneous Coronary Intervention;  Surgeon: Rogelio Ricks MD;  Location:  [facility]    EMBOLECTOMY LOWER EXTREMITY Right 10/6/2023    Procedure: Right anterior tibial bypass with graft, Right tibial  endarterectomy,thrombectomy, Right doraslis pedis thrombectomy, Anterior Tibial vein patch.;  Surgeon: Chadwick Lozano MD;  Location:  OR    ENDARTERECTOMY CAROTID Right 05/11/2016    Procedure: ENDARTERECTOMY CAROTID;  Surgeon: Chadwick Lozano MD;  Location:  OR    ENDARTERECTOMY CAROTID Left 06/08/2020    Procedure: LEFT CAROTID ENDARTERECTOMY with distal facal vein patch  and EEG;  Surgeon: Chadwick Lozano MD;  Location:  OR    ESOPHAGOSCOPY, GASTROSCOPY, DUODENOSCOPY (EGD), COMBINED N/A 12/12/2023    Procedure: Esophagoscopy, gastroscopy, duodenoscopy (EGD), combined;  Surgeon: Corey Hoffman MD;  Location:  GI    FINE NEEDLE ASPIRATION WITHOUT IMAGING GUIDANCE Right 9/22/2023    Procedure: Fine needle aspiration without imaging guidance;  Surgeon: Kiersten Aguilera MD;  Location:  GI    FLUORESCENCE INTRAOPERATIVE VASCULAR ANGIOGRAPHY Right 12/28/2022    Procedure: RIGHT LEG ANGIOGRAM with intervention;  Surgeon: Chadwick Lozano MD;  Location:  OR    GYN SURGERY  left tube    left salpingectomy    IR ANGIOGRAM THROUGH CATHETER (ARTERIAL)  10/6/2023    IR ANGIOGRAM THROUGH CATHETER (ARTERIAL)  10/6/2023    IR ANGIOGRAM THROUGH CATHETER (ARTERIAL)  3/31/2024    IR ANGIOGRAM THROUGH CATHETER (ARTERIAL)  3/30/2024    IR CVC TUNNEL PLACEMENT > 5 YRS OF AGE  4/3/2024    IR CVC TUNNEL REMOVAL RIGHT  5/28/2024    IR CVC TUNNEL REVISION RIGHT  4/15/2024    IR LOWER EXTREMITY ANGIOGRAM RIGHT  05/25/2021    IR LOWER EXTREMITY ANGIOGRAM RIGHT  10/5/2023    IR LOWER EXTREMITY ANGIOGRAM RIGHT  3/29/2024    IR OR ANGIOGRAM  6/23/2021    IR OR ANGIOGRAM  12/28/2022    IRRIGATION AND DEBRIDEMENT LOWER EXTREMITY, COMBINED Right 5/16/2024    Procedure: IRRIGATION AND DEBRIDEMENT OF RIGHT ABOVE KNEE AMPUTATION;  Surgeon: Tay Enciso MD;  Location:  OR    IRRIGATION AND DEBRIDEMENT LOWER EXTREMITY, COMBINED Right 5/20/2024    Procedure: IRRIGATION AND DEBRIDMENT  RIGHT LOWER EXTREMITY;  Surgeon: José Luis Hernandez MD;  Location:  OR    LAPAROSCOPIC CHOLECYSTECTOMY N/A 09/27/2017    Procedure: LAPAROSCOPIC CHOLECYSTECTOMY;  LAPAROSCOPIC CHOLECYSTECTOMY;  Surgeon: Jacoby Tapia MD;  Location:  [facility]    LAPAROSCOPY DIAGNOSTIC (GENERAL) N/A 8/11/2021    Procedure: Exploratory laparoscopy,  laparoscopic lysis of adhesions, laparotomy;  Surgeon: Corina Ferreira MD;  Location:  OR    OR ANGIOGRAM, LOWER EXTREMITY Right 10/11/2023    Procedure: Or Angiogram, Lower Extremity;  Surgeon: Chadwick Lozano MD;  Location:  OR    ORTHOPEDIC SURGERY      left knee surgery    REPAIR ANEURYSM FEMORAL ARTERY Left 12/28/2022    Procedure: REPAIR LEFT FEMORAL PSEUDOANEURYSM;  Surgeon: Chadwick Lozano MD;  Location:  OR    VASCULAR SURGERY  aoto bi fem  left fem-AK pop    [facility] FABRIC WRAPPING OF ABDOMINAL ANEURYSM      [facility] NONSPECIFIC PROCEDURE  12/2000    angioplasty with stent right fem. a.    [facility] NONSPECIFIC PROCEDURE  1987    sinus surgery    [facility] NONSPECIFIC PROCEDURE  09/02/2009    Emergent left groin exploration with oversewing of bleeding angiographic site. 2. Endarterectomy of common femoral-proximal superficial femoral artery with greater saphenous vein patch angioplasty.   a. [place] of accessory right greater saphenous vein.     [facility] NONSPECIFIC PROCEDURE  08/27/2009    occluded left common iliac and external iliac arteries were successfully revascularized with stenting to 8 and 7 mm          Physical Exam   Patient Vitals for the past 24 hrs:   BP Pulse Resp SpO2   06/21/24 1408 128/56 59 20 94 %       Physical Exam  Vitals: reviewed by me  General: Pt seen on [facility], cooperative, and alert to conversation  Eyes: Tracking well, clear conjunctiva BL  ENT: MMM, midline trachea.   Lungs: No tachypnea, no accessory muscle use. No respiratory distress.   CV: Rate as above  MSK: no joint effusion.  No evidence of trauma, right lower  extremity AKA with wound VAC in place, appears to be healing well.  Skin: No rash  Neuro: Clear speech and no facial droop.  Psych: Not RIS, no e/o AH/      Diagnostics   Lab Results   Labs Ordered and Resulted from Time of ED Arrival to Time of ED Departure   COMPREHENSIVE METABOLIC PANEL - Abnormal       Result Value    Sodium 129 (*)     Potassium 6.8 (*)     Carbon Dioxide (CO2) 18 (*)     Anion Gap 12      Urea Nitrogen 87.4 (*)     Creatinine 3.01 (*)     GFR Estimate 17 (*)     Calcium 7.7 (*)     Chloride 99      Glucose 116 (*)     Alkaline Phosphatase 63      AST 27      ALT 28      Protein Total 5.2 (*)     Albumin 2.4 (*)     Bilirubin Total <0.2     TROPONIN T, HIGH SENSITIVITY - Abnormal    Troponin T, High Sensitivity 174 (*)    NT PROBNP INPATIENT - Abnormal    N terminal Pro BNP Inpatient 11,959 (*)    CBC WITH PLATELETS AND DIFFERENTIAL - Abnormal    WBC Count 4.7      RBC Count 2.72 (*)     Hemoglobin 8.4 (*)     Hematocrit 26.5 (*)     MCV 97      MCH 30.9      MCHC 31.7      RDW 14.5      Platelet Count 248      % Neutrophils 53      % Lymphocytes 35      % Monocytes 7      % Eosinophils 4      % Basophils 2      % Immature Granulocytes 0      NRBCs per 100 WBC 0      Absolute Neutrophils 2.5      Absolute Lymphocytes 1.6      Absolute Monocytes 0.3      Absolute Eosinophils 0.2      Absolute Basophils 0.1      Absolute Immature Granulocytes 0.0      Absolute NRBCs 0.0     POTASSIUM - Abnormal    Potassium 7.5 (*)    GLUCOSE BY METER - Abnormal    GLUCOSE BY METER POCT 167 (*)    BASIC METABOLIC PANEL - Abnormal    Sodium 128 (*)     Potassium 6.4 (*)     Chloride 99      Carbon Dioxide (CO2) 17 (*)     Anion Gap 12      Urea Nitrogen 90.3 (*)     Creatinine 3.07 (*)     GFR Estimate 16 (*)     Calcium 8.5 (*)     Glucose 114 (*)    TROPONIN T, HIGH SENSITIVITY - Abnormal    Troponin T, High Sensitivity 177 (*)    GLUCOSE BY METER - Abnormal    GLUCOSE BY METER POCT 146 (*)    GLUCOSE BY METER  - Abnormal    GLUCOSE BY METER POCT 128 (*)    GLUCOSE BY METER - Abnormal    GLUCOSE BY METER POCT 116 (*)    GLUCOSE BY METER - Abnormal    GLUCOSE BY METER POCT 109 (*)    TROPONIN T, HIGH SENSITIVITY - Abnormal    Troponin T, High Sensitivity 156 (*)    GLUCOSE BY METER - Abnormal    GLUCOSE BY METER POCT 119 (*)    PROTEIN TOTAL - Abnormal    Protein Total 5.6 (*)    GLUCOSE BY METER - Abnormal    GLUCOSE BY METER POCT 122 (*)    POTASSIUM - Abnormal    Potassium 5.9 (*)    GLUCOSE MONITOR NURSING POCT   GLUCOSE FLUID   LACTATE DEHYDROGENASE FLUID   LACTATE DEHYDROGENASE   PROTEIN FLUID   PH FLUID   TRIGLYCERIDE FLUID   NON-GYNECOLOGIC CYTOLOGY   AEROBIC BACTERIAL CULTURE ROUTINE   ANAEROBIC BACTERIAL CULTURE ROUTINE   CELL COUNT WITH DIFFERENTIAL FLUID       Imaging  Chest CT w/o contrast   Final Result   IMPRESSION:    1.  Large left pleural effusion with complete collapse of the left upper and left lower lobes. This would be amenable for ultrasound-guided thoracentesis. There is also occlusion of the left mainstem bronchus. Pulmonary consultation recommended.   2.  Moderate-sized right pleural effusion and subsegmental atelectasis right lower lobe.   3.  Diffuse edema subcutaneous tissues.   4.  Severe atherosclerotic disease.         US Thoracentesis    (Results Pending)       EKG   ECG results from 06/21/24   EKG 12-lead, tracing only     Value    Systolic Blood Pressure     Diastolic Blood Pressure     Ventricular Rate 77    Atrial Rate 58    IL Interval 138    QRS Duration 76        QTc 511    P Axis -1    R AXIS -4    T Axis 67    Interpretation ECG      Sinus bradycardia with frequent Premature ventricular complexes  Low voltage QRS  Reviewed by Frnech [place]       *Note: Due to a large number of results and/or encounters for the requested time period, some results have not been displayed. A complete set of results can be found in Results Review.             ED Course    Medications  Administered  Medications   glucose gel 15-30 g (has no administration in time range)     Or   dextrose 50 % injection 25-50 mL (has no administration in time range)     Or   glucagon injection 1 mg (has no administration in time range)   ondansetron (ZOFRAN) injection 4 mg (has no administration in time range)   calcium gluconate 10 % injection 1 g (0 g Intravenous Stopped 6/21/24 1622)   furosemide (LASIX) injection 40 mg (40 mg Intravenous $Given 6/21/24 1545)   albuterol (PROVENTIL) neb solution 5 mg (5 mg Nebulization $Given 6/21/24 1544)   dextrose 10% BOLUS 300 mL ( Intravenous Rate/Dose Verify 6/21/24 1902)   dextrose 50 % injection 25 g (25 g Intravenous $Given 6/21/24 1544)   insulin regular 1 unit/mL injection 6 Units (6 Units Intravenous $Given 6/21/24 1559)   aspirin (ASA) chewable tablet 162 mg (162 mg Oral $Given 6/21/24 1541)   calcium gluconate 10 % injection 1 g (0 g Intravenous Stopped 6/21/24 1717)   sodium chloride 0.9% BOLUS 1,000 mL (0 mLs Intravenous Stopped 6/21/24 1902)   sodium zirconium cyclosilicate (LOKELMA) packet 10 g (10 g Oral $Given 6/21/24 1902)       Procedures  Procedures     Discussion of Management  I have called Dr. Salinas of the IR team who will attempt a thoracentesis either tonight or tomorrow morning.    Social Determinants of Health adding to complexity of care  None      Disposition  The patient was admitted to the hospital.       Medical Decision Making / Diagnosis   MIPS     None        MDM  This is a very pleasant 63-year-old female who presents to the emergency room with shortness of breath and abnormal chest x-ray that was found earlier today.  Her CT scan does show a pleural effusion and certainly does need to be drained, IR will be able to do it tonight or tomorrow morning I am told.  Patient does not need to be n.p.o. for this, and thankfully she has no fever here, and is doing very well on nasal cannula only.  Protecting airway, however I do think she needs to  go to the [facility] given her electrolyte issues.  She does have chronic kidney disease, but her creatinine was elevated, and her potassium is also quite elevated.  We have given several medications to help shift, and because she was bradycardic she did get calcium gluconate, given his low risk profile.  She is doing better here, her numbers looking better including her potassium, and she will be admitted to a monitored setting.  I spoke with the hospitalist team who very generously agreed to accept care of the patient, and the patient herself is resting comfortably and I also spoke with her sister and her other sister on the telephone.  Will plan for careful monitoring until next available inpatient bed.    Critical care time 30 minutes apart from procedures for hyperkalemia.        ICD-10 Codes:    ICD-10-CM    1. Pleural effusion on left  J90       2. Hypoxia  R09.02       3. Hyperkalemia  E87.5                   Jacoby Braswell MD  06/21/24 9925

## 2024-06-21 NOTE — H&P
[facility]    History and Physical - Hospitalist Service       Date of Admission:  6/21/2024    Assessment & Plan      Shirley Hendricks is a 63 year old female who has complex medical history which includes GERD, hypertension, history of B-cell lymphoma, hyperlipidemia, depression, anxiety, MI, peripheral vascular disease status angioplasty, DLE, COPD, CMT disease, tobacco use, CKD stage III with baseline creatinine around 1.9-2.8, who presented to the ER from her clinic as she was told that she has a lot of fluid around her lungs and diagnosed with significantly large left pleural effusion, moderate right pleural effusion, NSTEMI, hyperkalemia, hyponatremia and admitted on 6/21/24      Acute hypoxic respiratory failure  Left large pleural effusion  Right moderate pleural effusion  -Of note patient does not have any cough, fever or chills but has been feeling sensation of swollen in the axillary region and has been feeling more short of breath  -X-ray chest  6/21/24-reveals complete opacification of the left hemithorax  -CT chest-large left pleural effusion with complete collapse of the left upper and left lower and occlusion of left mainstem bronchus and a moderate size right pleural effusion  -ER did discuss with interventional radiology and they will plan to The patient later in the night or in the a.m.  -Does have elevated proBNP  -On exam she is only on 2 L of oxygen without any respiratory distress but no significant wheezing can be appreciated and exam may be limited with the amount of the fluid  -Discussed with ICU attending and they mentioned that Dr. Rogers who is also interventional pulmonary will be rounding in the ICU ion 6/22/24 and a.m. team can touch base with her if there are issues or ongoing concerns about occlusion of the bronchus  -Continue with PTA inhaler regimen and monitor very closely under IMC  -IR consult placed and they are aware as per my discussion with the  ED attending he did speak with dr abreu and I have page out to him also  -Will order thoracentesis labs      CKD with recent acute renal failure in May 2024 needing hemodialysis  Hyponatremia  Hyperkalemia  Metabolic acidosis  -Recent baseline creatinine has been fluctuating between 1.9-2.8  -Creatinine is 3.07, potassium is 7.5, sodium of 128 with bicarb of 17  -In the ER patient did receive albuterol, 2 g calcium gluconate, insulin, Kayexalate 10 g and 40 IV Lasix and 1 L IV fluid bolus  -EKG showed sinus bradycardia with prolonged QT  -Repeat potassium check and if still high then give another dose of kayexalate  -Check monitoring  -Of note patient during hospitalization in May 2024 was needing hemodialysis but it was stopped  -Nephrology consult  -Repeat potassium ordered  -Will order hemodialysis diet    Addendum 946   - k is down to 5.9 and will give another Kayexalate 5 mg in 2 hours and repeat potassium check at 2 AM    Elevated troponin likely due to demand  Sinus bradycardia with PVCs and prolonged QT  Hypertension  Hyperlipidemia  History of coronary disease with an MI in 2019  -PTA amlodipine 10 mg, Coreg 12.5 twice daily with holding parameters, Crestor 10 mg, hydralazine 10 mg 4 times daily  -Left heart cath on 10/4/2023 showed-completely occluded D1 with left to left collaterals from distal LAD to D1, moderate CAD involving proximal to mid RCA not significant by IFR and moderate coronary disease please involving distal small caliber RPL and distal circumflex artery  -Last echo 11/23 showed EF of 55 to 60% with normal RV and moderate trileaflet aortic sclerosis  -EKG as above  -Patient does not have any significant chest discomfort and troponin are 174 and 177 and trending down to 156  -Of note patient does have risk factors but given hyperkalemia heparin can cause worsening of hyperkalemia and no chest pain   -Cardiac monitor  -Echo  -Cardio consult  -Continue with amlodipine, statin and hydralazine  but given her bradycardia will not be able to start Coreg for now      History of peripheral vascular disease status above-knee amputation on the right with wound dressing status washout on 5/24  Neuropathic pain  -Patient has followed with vascular surgery and has noticed during last hospitalization in May 2024 she underwent washout with wound VAC placement and was discharged on Plavix and Crestor  -On exam the amputation stump does not look infected and has wound vac in place   -Continue with PTA Plavix 75 mg daily and continue with gabapentin but renally cleared and will order for am at reduced dose     COPD not in exacerbation  -On exam has no wheezing and continue with as needed symbicort      GERD  -Continue with PTA Pepcid    Anemia of chronic disease  -Recent baseline hemoglobin has been fluctuating and has been between 8.4-10.1  -Hemoglobin stable at 8.4  -Continue to monitor and likely due to anemia of chronic disease  -Repeat labs in a.m.    History of B-cell lymphoma  -Follows with [place] [facility]    Tobacco use  -Continue with nicotine patch    Discharge disposition   -Patient will need to be in the hospital for at least 2 to 3 days and needs multiple consultation and is critically ill and her family is aware she is in the hospital   -I did talk with sister carlos at 946 p.m. and she was updated and the plan of care was discussed and patient has given permission to speak with sisters          Diet: Low potassium and regular  DVT Prophylaxis: Pneumatic Compression Devices  Alvarez Catheter: Not present  Lines: None     Cardiac Monitoring: None  Code Status:  Full code        Clinically Significant Risk Factors Present on Admission        # Hyperkalemia: Highest K = 7.5 mmol/L in last 2 days, will monitor as appropriate  # Hyponatremia: Lowest Na = 128 mmol/L in last 2 days, will monitor as appropriate      # Hypoalbuminemia: Lowest albumin = 2.4 g/dL at 6/21/2024  2:49 PM, will monitor as appropriate  "  # Drug Induced Platelet Defect: home medication list includes an antiplatelet medication   # Hypertension: Noted on problem list    # Anemia: based on hgb <11           # Overweight: Estimated body mass index is 25.12 kg/m  as calculated from the following:    Height as of an earlier encounter on 6/21/24: 1.549 m (5' 1\").    Weight as of 6/5/24: 60.3 kg (132 lb 15 oz).       # Financial/Environmental Concerns:           Disposition Plan     Medically Ready for Discharge: Anticipated in 2-4 Days           Bruce Ulloa MD  Hospitalist Service  [facility]  Securely message with Eyewitness Surveillance (more info)  Text page via [employer] Paging/Directory     This note was completed in part using dictation via the Dragon voice recognition software. Some word and grammatical errors may occur and must be interpreted in the appropriate clinical context. If there are any questions pertaining to this issue, please contact me for further clarification.      I am on admitting shift and her care in the morning will be taken over by hospital medicine team  ______________________________________________________________________    Chief Complaint     Sent from clinic as x-ray showed fluid    History is obtained from the patient    History of Present Illness       Shirley Hendricks is a 63 year old female who has complex medical history which includes GERD, hypertension, history of B-cell lymphoma, hyperlipidemia, depression, anxiety, MI, peripheral vascular disease status angioplasty, DLE, COPD, CMT disease, tobacco use, CKD stage III with baseline creatinine around 1.9-2.8, who presented to the ER from her clinic as she was told that she has a lot of fluid around her lungs.    Patient has had recent hospitalization from 3/29-4/22 when she was admitted for right ischemic leg, severe peripheral arterial disease and underwent above-knee amputation on the right and her course was complicated by development of sepsis, " retroperitoneal hematoma    Readmission again to [facility] from 5/15-5/29 where she was admitted after she had the same of the wound of the AKA and necrosis that needed debridement and underwent debridement on 5/16 and also during that hospital stay patient had renal failure which needed dialysis which was stopped and she was told to take Bumex 2 mg daily with close follow-up to nephrology      Lab work in the ED  Sodium of 128, potassium initially was 7.5 and repeat is trending down to 6.4, chloride of 99, bicarb of 19, BUN of 19.3, creatinine of 3.07, GFR of 16, calcium of 8.5, elevated 9 gap of 12, blood glucose of 114, proBNP of 11,959, initial high sensitive troponin of 174 and repeat of 177    WBC count of 4.7, hemoglobin of 8.4, platelet count of 248    Chest x-ray was done which shows complete opacification of the left hemothorax which may be due to large pleural effusion or complete atelectasis and small right pleural effusion    CT chest shows-large left pleural effusion with complete collapse of the left upper and left lower lobe and occlusion of the left mainstem bronchus, moderate sized right pleural effusion and subsegmental atelectasis of the right lower lobe and diffuse subcutaneous edema and severe atherosclerotic disease    EKG showed sinus bradycardia with PVCs    In the ER patient was given albuterol nebs, 2 g calcium gluconate, 1 L IV fluid bolus, 40 IV Lasix,, dextrose, 10 g Lokelma and the ED physician did speak with IR with Dr. 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 clot    Post-menopausal     Reflux esophagitis 02/2004    EGD: esophagitis and medium HH    SBO (small bowel obstruction) (H) 08/10/2021    SVT (supraventricular tachycardia) (H24)     Thrombocytopenia (H24)     Uncomplicated asthma     Vitamin C deficiency 08/12/2018       Past Surgical History   Past Surgical History:   Procedure Laterality Date    AMPUTATE LEG ABOVE KNEE N/A 4/1/2024    Procedure: AMPUTATION, ABOVE KNEE right leg with wound vac dressing, excision of anteriotibial bypass graft, closure of (previous interventional radiology) left groin incision;  Surgeon: José Luis Hernandez MD;  Location:  OR    AMPUTATE LEG ABOVE KNEE Right 4/9/2024    Procedure: COMPLETION RIGHT ABOVE KNEE AMPUTATION;  Surgeon: José Luis Hernandez MD;  Location:  OR    ANGIOGRAM      ANGIOGRAM Right 6/23/2021    Procedure: RIGHT LOWER EXTREMITY ANGIOGRAM WITH LEFT BRACHIAL ARTERY CUTDOWN;  Surgeon: José Luis Hernandez MD;  Location:  OR    APPLY WOUND VAC Right 5/16/2024    Procedure: PLACEMENT OF WOUND VAC TO RIGHT ABOVE KNEE AMPUTATION;  Surgeon: Tay Enciso MD;  Location:  OR    APPLY WOUND VAC N/A 5/20/2024    Procedure: AND PLACEMENT OF WOUND VAC;  Surgeon: José Luis Hernandez MD;  Location:  OR    BIOPSY MASS NECK Right 10/11/2023    Procedure: Right Parotid Biopsy;  Surgeon: Randal Mendoza MD;  Location:  OR    BYPASS GRAFT FEMOROPOPLITEAL Right 09/23/2020    Procedure: RIGHT FEMORAL GRAFT TO  ABOVE-KNEE POPLITEAL BYPASS USING CADAVERIC SUPERFICIAL FEMORAL ARTERY;  Surgeon: Chadwick Lozano MD;  Location:  OR    BYPASS GRAFT FEMOROPOPLITEAL Right 2/15/2022    Procedure: RIGHT SUPERFICIAL FEMORAL ARTERY GRAFT TO BELOW KNEE POPLITEAL BYPASS WITH CADAVERIC CRYOLIFE  FEMORAL-POPLITEAL ARTERY SIZE: OUTER DIAMETER: 7MM - 6MM;  Surgeon: Chadwick Lozano;  Location: [facility] OR    BYPASS GRAFT FEMOROPOPLITEAL Right 5/26/2023    Procedure: RIGHT DISTAL SUPERFICIAL FEMORAL GRAFT TO ANTERIOR TIBIAL ARTERY WITH 26 CENTIMETER CADAVERIC SUPERFICIAL FEMORAL ARTERY GRAFT;  Surgeon: Chadwick Lozano MD;  Location: [facility] OR    BYPASS GRAFT FEMOROPOPLITEAL Right 10/11/2023    Procedure: RIGHT FEMORAL TO POPLITEAL GRAFT THROMBECTOMY;  Surgeon: Chadwick Lozano MD;  Location:  OR    BYPASS GRAFT INSITU FEMOROPOPLITEAL Right 7/7/2021    Procedure: CREATION RIGHT FEMORAL TO POPLITEAL ARTERIAL BYPASS USING INSITU VEIN GRAFT;  Surgeon: José Luis Hernandez MD;  Location:  OR    CARDIAC CATHERIZATION  09/03/2009    multivessel CAD without target lesions, med mgmt indicated, preserved ef    CARPAL TUNNEL RELEASE RT/LT Right 05/20/2021    COLONOSCOPY N/A 12/12/2023    Procedure: Colonoscopy;  Surgeon: Corey Hoffman MD;  Location:  GI    COLONOSCOPY N/A 12/14/2023    Procedure: Colonoscopy;  Surgeon: Corey Hoffman MD;  Location:  GI    CV CORONARY ANGIOGRAM N/A 10/4/2023    Procedure: Coronary Angiogram;  Surgeon: Rogelio Ricks MD;  Location:  HEART CARDIAC CATH LAB    CV PCI N/A 10/4/2023    Procedure: Percutaneous Coronary Intervention;  Surgeon: Rogelio Ricks MD;  Location:  HEART CARDIAC CATH LAB    EMBOLECTOMY LOWER EXTREMITY Right 10/6/2023    Procedure: Right anterior tibial bypass with graft, Right tibial endarterectomy,thrombectomy, Right doraslis pedis thrombectomy, Anterior Tibial vein patch.;  Surgeon: Chadwick Lozano MD;  Location:  OR     ENDARTERECTOMY CAROTID Right 05/11/2016    Procedure: ENDARTERECTOMY CAROTID;  Surgeon: Chadwick Lozano MD;  Location:  OR    ENDARTERECTOMY CAROTID Left 06/08/2020    Procedure: LEFT CAROTID ENDARTERECTOMY with distal facal vein patch  and EEG;  Surgeon: Chadwick Lozano MD;  Location:  OR    ESOPHAGOSCOPY, GASTROSCOPY, DUODENOSCOPY (EGD), COMBINED N/A 12/12/2023    Procedure: Esophagoscopy, gastroscopy, duodenoscopy (EGD), combined;  Surgeon: Corey Hoffman MD;  Location:  GI    FINE NEEDLE ASPIRATION WITHOUT IMAGING GUIDANCE Right 9/22/2023    Procedure: Fine needle aspiration without imaging guidance;  Surgeon: Kiersten Aguilera MD;  Location:  GI    FLUORESCENCE INTRAOPERATIVE VASCULAR ANGIOGRAPHY Right 12/28/2022    Procedure: RIGHT LEG ANGIOGRAM with intervention;  Surgeon: Chadwick Lozano MD;  Location:  OR    GYN SURGERY  left tube    left salpingectomy    IR ANGIOGRAM THROUGH CATHETER (ARTERIAL)  10/6/2023    IR ANGIOGRAM THROUGH CATHETER (ARTERIAL)  10/6/2023    IR ANGIOGRAM THROUGH CATHETER (ARTERIAL)  3/31/2024    IR ANGIOGRAM THROUGH CATHETER (ARTERIAL)  3/30/2024    IR CVC TUNNEL PLACEMENT > 5 YRS OF AGE  4/3/2024    IR CVC TUNNEL REMOVAL RIGHT  5/28/2024    IR CVC TUNNEL REVISION RIGHT  4/15/2024    IR LOWER EXTREMITY ANGIOGRAM RIGHT  05/25/2021    IR LOWER EXTREMITY ANGIOGRAM RIGHT  10/5/2023    IR LOWER EXTREMITY ANGIOGRAM RIGHT  3/29/2024    IR OR ANGIOGRAM  6/23/2021    IR OR ANGIOGRAM  12/28/2022    IRRIGATION AND DEBRIDEMENT LOWER EXTREMITY, COMBINED Right 5/16/2024    Procedure: IRRIGATION AND DEBRIDEMENT OF RIGHT ABOVE KNEE AMPUTATION;  Surgeon: Tay Enciso MD;  Location:  OR    IRRIGATION AND DEBRIDEMENT LOWER EXTREMITY, COMBINED Right 5/20/2024    Procedure: IRRIGATION AND DEBRIDMENT RIGHT LOWER EXTREMITY;  Surgeon: José Luis Hernandez MD;  Location:  OR    LAPAROSCOPIC CHOLECYSTECTOMY N/A 09/27/2017    Procedure: LAPAROSCOPIC  CHOLECYSTECTOMY;  LAPAROSCOPIC CHOLECYSTECTOMY;  Surgeon: Jacoby Tapia MD;  Location: [facility]    LAPAROSCOPY DIAGNOSTIC (GENERAL) N/A 8/11/2021    Procedure: Exploratory laparoscopy,  laparoscopic lysis of adhesions, laparotomy;  Surgeon: Corina Ferreira MD;  Location:  OR    OR ANGIOGRAM, LOWER EXTREMITY Right 10/11/2023    Procedure: Or Angiogram, Lower Extremity;  Surgeon: Chadwick Lozano MD;  Location:  OR    ORTHOPEDIC SURGERY      left knee surgery    REPAIR ANEURYSM FEMORAL ARTERY Left 12/28/2022    Procedure: REPAIR LEFT FEMORAL PSEUDOANEURYSM;  Surgeon: Chadwick Lozano MD;  Location:  OR    VASCULAR SURGERY  aoto bi fem  left fem-AK pop    [facility] FABRIC WRAPPING OF ABDOMINAL ANEURYSM      [facility] NONSPECIFIC PROCEDURE  12/2000    angioplasty with stent right fem. a.    [facility] NONSPECIFIC PROCEDURE  1987    sinus surgery    [facility] NONSPECIFIC PROCEDURE  09/02/2009    Emergent left groin exploration with oversewing of bleeding angiographic site. 2. Endarterectomy of common femoral-proximal superficial femoral artery with greater saphenous vein patch angioplasty.   a. [place] of accessory right greater saphenous vein.     [facility] NONSPECIFIC PROCEDURE  08/27/2009    occluded left common iliac and external iliac arteries were successfully revascularized with stenting to 8 and 7 mm        Prior to Admission Medications   Prior to Admission Medications   Prescriptions Last Dose Informant Patient Reported? Taking?   acetaminophen (TYLENOL) 500 MG tablet   No No   Sig: Take 1-2 tablets (500-1,000 mg) by mouth every 6 hours as needed for mild pain   amLODIPine (NORVASC) 10 MG tablet Past Week at ran out of at least 3 days ago  No Yes   Sig: Take 1 tablet (10 mg) by mouth daily   budesonide-formoterol (SYMBICORT) 160-4.5 MCG/ACT Inhaler New Rx at not picked up yet  No No   Sig: Inhale 2 puffs into the lungs two times daily Disp 3 inhalers   bumetanide (BUMEX) 2 MG tablet Rx not filled  No No   Sig: Take  1 tablet (2 mg) by mouth daily   carvedilol (COREG) 12.5 MG tablet 6/21/2024 at am  No Yes   Sig: Take 1 tablet (12.5 mg) by mouth 2 times daily (with meals)   cetirizine (ZYRTEC) 5 MG tablet Past Week  No Yes   Sig: Take 1 tablet (5 mg) by mouth daily   clopidogrel (PLAVIX) 75 MG tablet 6/21/2024 at am  No Yes   Sig: Take 1 tablet (75 mg) by mouth daily   diphenoxylate-atropine (LOMOTIL) 2.5-0.025 MG tablet   No Yes   Sig: Take 1 tablet by mouth 4 times daily as needed for diarrhea   famotidine (PEPCID) 20 MG tablet Rx not filled  No No   Sig: Take 1 tablet (20 mg) by mouth 2 times daily   gabapentin (NEURONTIN) 100 MG capsule 6/20/2024 at pm  No Yes   Sig: Take 2 capsules (200 mg) by mouth at bedtime   hydrALAZINE (APRESOLINE) 10 MG tablet Don't think Rx filled  No No   Sig: Take 1 tablet (10 mg) by mouth 4 times daily   miconazole (MICATIN) 2 % external powder   No No   Sig: Apply topically 2 times daily   nicotine (NICODERM CQ) 14 MG/24HR 24 hr patch 6/20/2024  No Yes   Sig: Place 1 patch onto the skin daily   nicotine (NICODERM CQ) 14 MG/24HR 24 hr patch   No No   Sig: Place 1 patch onto the skin every 24 hours   nitroGLYcerin (NITROSTAT) 0.4 MG sublingual tablet  Self No No   Sig: Place 1 tablet (0.4 mg) under the tongue See Admin Instructions for chest pain   ondansetron (ZOFRAN ODT) 4 MG ODT tab   No No   Sig: Take 1 tablet (4 mg) by mouth every 6 hours as needed for nausea or vomiting   oxyCODONE (ROXICODONE) 5 MG tablet 6/21/2024  No Yes   Sig: Take 1 tablet (5 mg) by mouth 2 times daily as needed for moderate pain   polyethylene glycol (MIRALAX) 17 GM/Dose powder   No No   Sig: Take 17 g by mouth daily   rosuvastatin (CRESTOR) 10 MG tablet 6/20/2024  No Yes   Sig: Take 1 tablet (10 mg) by mouth at bedtime   saccharomyces boulardii (FLORASTOR) 250 MG capsule Unsure  No No   Sig: Take 1 capsule (250 mg) by mouth 2 times daily   silver sulfADIAZINE (SILVADENE) 1 % external cream   No No   Sig: Apply  topically daily   wound support modular (EXPEDITE) LIQD bottle   No No   Sig: Take 60 mLs by mouth daily      Facility-Administered Medications: None           Physical Exam   Vital Signs: Temp: (!) 96.1  F (35.6  C) Temp src: Temporal BP: 132/67 Pulse: 52   Resp: 15 SpO2: 94 % O2 Device: Nasal cannula Oxygen Delivery: 2 LPM  Weight: 0 lbs 0 oz        General:aox2-3  HEENT: Head is atraumatic, normocephalic.  Neck: Neck is supple  Respiratory: Lungs are clear to auscultation on the right but decreased lung sounds on the left  Cardiovascular: Regular rate , S1 and S2 normal with no murmer or rubs or gallops  Abdomen:   soft , non tender , non distended and bowel sound present   Skin: No skin rashes or lesions to inspection or palpation.  Neurologic: No apparent focal deficit  Musculoskeletal: Normal Range of motion over upper and lower extremities bilaterally apart from the right lower extremity which has AKA and wound vac in place  Psychiatric: cooperative      Medical Decision Making       Time spent in care of patient is 80 minutes and I reviewed labs including CBC, comprehensive metabolic panel, x-ray chest and discussed plan of care in detail with the patient, nursing staff, ED physician      Data     I have personally reviewed the following data over the past 24 hrs:    4.7  \   8.4 (L)   / 248     128 (L) 99 90.3 (H) /  109 (H)   6.4 (HH) 17 (L) 3.07 (H) \     ALT: 28 AST: 27 AP: 63 TBILI: <0.2   ALB: 2.4 (L) TOT PROTEIN: 5.2 (L) LIPASE: N/A     Trop: 177 (HH) BNP: 11,959 (H)     TSH: N/A T4: N/A A1C: 4.6       Imaging results reviewed over the past 24 hrs:   Recent Results (from the past 24 hour(s))   XR Chest 2 Views    Narrative    XR CHEST 2 VIEWS 6/21/2024 10:13 AM    HISTORY: SOB. Left dullness. ? effusion; SOB (shortness of breath)    COMPARISON: 4/3/2024 and 4/2/2024      Impression    IMPRESSION: Complete opacification of the left hemithorax which may be  due to a large left pleural effusion or  complete atelectasis of the  left lung. Please correlate with ultrasound. Small right pleural  effusion.    BRANDON ALBERTS MD         SYSTEM ID:  [id number]   Chest CT w/o contrast    Narrative    EXAM: CT CHEST W/O CONTRAST  LOCATION: [facility]  DATE: 6/21/2024    INDICATION: Left lung white out on recent chest x-ray, and SOB  COMPARISON: Chest x-ray 6/21/2024 and CT abdomen and pelvis 05/02/2024  TECHNIQUE: CT chest without IV contrast. Multiplanar reformats were obtained. Dose reduction techniques were used.  CONTRAST: None.    FINDINGS:   LUNGS AND PLEURA: A large left pleural effusion has developed since the prior studies, with complete compressive atelectasis/collapse involving the left upper and lower lobes. Moderate size right pleural effusion and subsegmental atelectasis involving a   moderate portion of the right lower lobe. Minimal scarring or thickening right upper lobe.    MEDIASTINUM/AXILLAE: The distal left mainstem bronchus is occluded No lymphadenopathy. No thoracic aortic aneurysm. Atherosclerotic disease thoracic aorta. Plaque at the origins of the great vessels arising from the arch.    CORONARY ARTERY CALCIFICATION: Severe.    UPPER ABDOMEN: Severe atherosclerotic disease abdominal aorta and branch vessels, incompletely evaluated. Cholecystectomy.    MUSCULOSKELETAL: Edematous changes subcutaneous tissues. Hypertrophic changes thoracic spine.      Impression    IMPRESSION:   1.  Large left pleural effusion with complete collapse of the left upper and left lower lobes. This would be amenable for ultrasound-guided thoracentesis. There is also occlusion of the left mainstem bronchus. Pulmonary consultation recommended.  2.  Moderate-sized right pleural effusion and subsegmental atelectasis right lower lobe.  3.  Diffuse edema subcutaneous tissues.  4.  Severe atherosclerotic disease.

## 2024-06-21 NOTE — PROGRESS NOTES
ASSESSMENT:    1. SOB (shortness of breath)   CXR today shows complete opacification left lung field with pleural effusion. Recent increased SOB and O2 sats 91%. Pt will present to ER at [facility] for acute thoracentesis and possible  admission for monitoring, diuresis  - XR Chest 2 Views; Future    2. Pleural effusion, left  See #1    3. S/P AKA (above knee amputation) unilateral, right (H)   Stable. Has wound vac. Management per vascular clinic. Has follow-up 7/1/24 with them    4. Type 2 diabetes mellitus with diabetic peripheral angiopathy without gangrene, without long-term current use of insulin (H)  Not checking blood sugars.  Previously well-controlled with A1c 5.2.  Due for lab follow-up  - Basic metabolic panel; Future  - Hemoglobin A1c; Future  - Basic metabolic panel  - Hemoglobin A1c    5. Chronic obstructive pulmonary disease, unspecified COPD type (H)  Secondary to previous tobacco use.  Encourage patient to use in the court each day BID  - budesonide-formoterol (SYMBICORT) 160-4.5 MCG/ACT Inhaler; Inhale 2 puffs into the lungs two times daily Disp 3 inhalers  Dispense: 30.6 g; Refill: 3    6. Cigarette nicotine dependence with nicotine-induced disorder  Has not smoked since hospital discharge.  Encouraged continued use of nicotine patch daily    7. CKD (chronic kidney disease) stage 4, GFR 15-29 ml/min (H)  GFR worsened with most recent result to 22. Due for follow-up  - Basic metabolic panel; Future  - Basic metabolic panel    8. Anemia, unspecified type   Last Hgb improved to 10.1. Denies seeing blood in stools. Likely contributing some to SOB. Lab follow-up today  - CBC with platelets; Future  - Iron & Iron Binding Capacity; Future  - CBC with platelets  - Iron & Iron Binding Capacity      PLAN:  Labs as ordered  Continue nicotine patch daily  Use Breo inhaler twice a day every day  Pt will present to [facility] ER for thoracentesis and possible admission depending on level of SOB improvement with  procedure             Vishal Watson is a 63 year old, presenting for the following health issues:  Hospital F/U       Pt has some spots on legs and arms they would like to be looked at. Pt also starts that their left breast is much larger than the right breast at the moment, which is not typical for the pt.   [facility]        Hospital Follow-up Visit:    Hospital/Nursing Home/IP Rehab Facility:   Transitional Care  Date of Admission: 5/29  Date of Discharge: 6/8  Reason(s) for Admission: You were admitted from the hospital to the [place] [facility] for ongoing physical and occupational therapy as well as wound cares for you new wound VAC  Was the patient in the ICU or did the patient experience delirium during hospitalization?  No  Do you have any other stressors you would like to discuss with your provider? No    Problems taking medications regularly:  None  Medication changes since discharge: None  Problems adhering to non-medication therapy:  None    Summary of hospitalization:  [facility] discharge summary reviewed  Diagnostic Tests/Treatments reviewed.  Follow up needed:   - Follow up with PCP 6/21/2024 as planned: needs CBC, BMP, Mg, phos within 1 week of discharge  - Follow up with vascular surgery 6/10 and 7/1 as planned  - Follow up with nephology at next available appointment  Other Healthcare Providers Involved in Patient s Care:         olvin;, vascular, PT, HC  Update since discharge: Pt is having a hard time breathing, Pt is having more swelling since being discharged. Pt is still having pain since being released         Plan of care communicated with patient          Discharge summary reviewed. Part of the summary as below:      [facility]  Hospitalist Discharge Summary       Date of Admission: 5/29/2024  Date of Discharge: 6/8/2024  Discharging Provider: Opal Zurita PA-C  Discharge Service: Hospitalist Service        Discharge Diagnoses  Severe PAD with  acute thrombosis of CFA-AT bypass and acute limb ischemia s/p R AKA 4/2024  Stump wound dehiscence and necrosis s/p I&D and wound VAC placement 5/16/2   Improving LIMA  Hyponatremia  HTN  Hyperphosphatemia  Possible celiac disease, loose stools  Asthma  COPD  DMII  GERD  Tobacco use disorder  HLD           Follow-ups Needed After Discharge  - Follow up with PCP 6/21/2024 as planned: needs CBC, BMP, Mg, phos within 1 week of discharge  - Follow up with vascular surgery 6/10 and 7/1 as planned  - Follow up with nephology at next available appointment      Unresulted Labs Ordered in the Past 30 Days of this Admission         No orders found from 4/29/2024 to 5/30/2024.                      Discharge Disposition  Discharged to home  Condition at discharge: Stable           Hospital Course  Shirley Hendricks is a 63 year old female with history of Charcot-Breonna Tooth disease, severe PAD, SLE, DM II, HTN, HLD, CAD, GERD, SVT, possible celiac disease, prior B cell lymphoma (outpatient surveillance), asthma/COPD, and depression/anxiety who was admitted to [facility] 3/29-4/22/24 with right limb ischemia in setting of severe PAD ultimately requiring right AKA with complicated post op course (including LIMA requiring HD) after which she was discharged to  ARU. She was readmitted to [facility] 5/15-5/29/24 with stump eschars requiring surgical debridement and wound VAC placement. Her dialysis catheter has been removed. Transferred to  TCU for rehabilitation and wound cares.      Severe PAD with acute thrombosis of CFA-AT bypass and acute limb ischemia s/p R AKA 4/2024. Stump wound dehiscence and necrosis s/p I&D and wound VAC placement 5/16/24: AKA 4/1/24 with RTOR 4/9 at [facility]. Discharged to ARU and then returned to hospital with stump eschars. S/p I&D and wound VAC placement 5/16 with VAC exchange in OR 5/20. Will eventually require split thickness skin graft. Wound was not felt to be infected - surgical culture grew  staph capitis which the team felt had low pathogenicity and was unlikely to have cause wound dehiscence. Was not seen by ID. Remains afebrile, no leukocytosis  - Patient to continue with wound VAC on discharge and will see vascular surgery 6/10/2024 for ongoing care  - Pain control with PRN Tylenol and oxycodone (5 mg) 30 min prior to dressing changes. Continue nightly gabapentin  - Continue Plavix and statin     LIMA, improving: Initially occurred at  [facility] 2/2 rhabdomyolysis and ischemic injury. Required HD (last run 5/18, line since removed). I&Os were quite challenging given Purewick use, but Cr improved throughout TCU stay and was 1.71 (1.8) on last check 6/7   - Repeat labs within 1 week of discharge   - Continue a low potassium diet on discharge  - Continue bumex   - Needs OP nephrology follow up through  [facility] 2-3 weeks after discharge. Per chart review, next available is 1/2025 but team is working to get that pushed up     Hyponatremia: Mild and improving. Na 132 on 6/7. Trend BMP as above     Possible celiac disease, loose stools: Poorly compliant with gluten free diet. Incontinent of stool.  - Continue gluten free diet on discharge   - OP GI follow up as needed  - Continue PRN Lomotil for loose stools     HTN: BP elevated at times while at the TCU but improving near time of discharge. Continue amlodipine, carvedilol, Bumex, and hydralazine on discharge. Follow up with PCP for ongoing management      Hyperphosphatemia: In the setting of the above.   - Sevelamer is not covered by insurance. Given improving Cr, not prescribed on discharge. PCP to determine if needed going forward as renal function continues to normalize. Needs phos checked within 1 week of hspital discharge     Other Medical Issues:  Asthma, COPD: No acute concerns. Continue inhalers  GERD: Continue renally dosed Pepcid   DM II: A1c 5.2. Diet controlled   Tobacco use disorder: Needs smoking cessation. Continue nicotine patch  HLD: Continue  "statin           Consultations This Hospital Stay  WOCN, PT, OT           Code Status  Full Code         Most recent lab results reviewed with pt.      Has not smoked cigarettes since hospital discharge.  Using nicotine patches 14 mg daily.  Has increased shortness of breath.  Rare cough that is clear.  Last use Breo inhaler 3 days ago.  Denies chest pain.  No orthopnea or PND.  Has wound VAC on Right AKA. Recent vascular note from 6/10/24 reviewed. Has follow-up with them 7/1/24. Pain control OK  NO F/C or other URI sx besides above  Eating OK. No abd pain. BMs better       Additional ROS:   Constitutional, HEENT, Cardiovascular, Pulmonary, GI and , Neuro, MSK and Psych review of systems/symptoms are otherwise negative or unchanged from previous, except as noted above.      OBJECTIVE:  /63   Pulse 66   Temp 97.6  F (36.4  C) (Temporal)   Ht 1.549 m (5' 1\")   LMP  (LMP Unknown)   SpO2 91%   BMI 25.12 kg/m     Estimated body mass index is 25.12 kg/m  as calculated from the following:    Height as of this encounter: 1.549 m (5' 1\").    Weight as of 6/5/24: 60.3 kg (132 lb 15 oz).     HENT: ear canals and TM's normal and nose and mouth without ulcers or lesions   Neck: no adenopathy. Thyroid normal to palpation. No bruits  Pulm: Lungs clear to auscultation right. Dullness entire left lung field  CV: Regular rates and rhythm  GI: Soft, nontender, Normal active bowel sounds, No hepatosplenomegaly or masses palpable  Ext: Peripheral pulses intact.  1 plus LLE  edema. Right AKA with wound vac on end  Neuro: Normal strength and tone, sensory exam grossly normal to LTS distal LLE    CXR today:  XR CHEST 2 VIEWS 6/21/2024 10:13 AM     HISTORY: SOB. Left dullness. ? effusion; SOB (shortness of breath)     COMPARISON: 4/3/2024 and 4/2/2024                                                                      IMPRESSION: Complete opacification of the left hemithorax which may be  due to a large left pleural effusion " or complete atelectasis of the  left lung. Please correlate with ultrasound. Small right pleural  effusion.     BRANDON ALBERTS MD    (Chart documentation was completed, in part, with [employer] voice-recognition software. Even though reviewed, some grammatical, spelling, and word errors may remain.)    Vasquez Benoit MD  Internal Medicine Department  [facility]

## 2024-06-21 NOTE — TELEPHONE ENCOUNTER
Patient Contact    Attempt # 1    Was call answered?  No.  Left message on voicemail with information to call me back.    Upon call back, please relay provider note below to pt.     Thank you,  Nehal Rodgers RN

## 2024-06-21 NOTE — PHARMACY-ADMISSION MEDICATION HISTORY
Pharmacist Admission Medication History    Admission medication history is complete. The information provided in this note is only as accurate as the sources available at the time of the update.    Information Source(s): Patient, Family member, and CareEverywhere/SureScripts via in-person    Pertinent Information:   -Patient and sister not able to provide all details of current medications. Patient or sister confirmed and recognized names of most medications she is taking. She reports running out of amlodipine at least 3 days ago, Symbicort inhaler is new & Rx not picked up, she reports bumetanide & famotidine not filled on facility discharge 6/8 so not taking. She does not think she has hydralazine and unsure about probiotic. Sister thought Dr Alexa stopped a medication in past couple weeks but not sure which one.  -There are current fills in SureScripts for amlodipine, Symbicort, bumetanide, carvedilol, lomotil, clopidogrel, famotidine, gabapentin, hydralazine, nicotine patch, oxycodone, rosuvastatin.    Changes made to PTA medication list:  Added: None  Deleted: None  Changed: None    Medication History Completed By: Yanira Mario [facility] 6/21/2024 5:16 PM      Prior to Admission medications    Medication Sig Last Dose Taking? Auth Provider Long Term End Date   amLODIPine (NORVASC) 10 MG tablet Take 1 tablet (10 mg) by mouth daily Past Week at ran out of at least 3 days ago Yes Opal Zurita PA-C Yes    carvedilol (COREG) 12.5 MG tablet Take 1 tablet (12.5 mg) by mouth 2 times daily (with meals) 6/21/2024 at am Yes Opal Zurita PA-C Yes    cetirizine (ZYRTEC) 5 MG tablet Take 1 tablet (5 mg) by mouth daily Past Week Yes Opal Zurita PA-C     clopidogrel (PLAVIX) 75 MG tablet Take 1 tablet (75 mg) by mouth daily 6/21/2024 at am Yes Opal Zurita PA-C Yes    diphenoxylate-atropine (LOMOTIL) 2.5-0.025 MG tablet Take 1 tablet by mouth 4 times daily as needed for  diarrhea  Yes Opal Zurita PA-C No    gabapentin (NEURONTIN) 100 MG capsule Take 2 capsules (200 mg) by mouth at bedtime 6/20/2024 at pm Yes ParminderOpal patel PA-C Yes    nicotine (NICODERM CQ) 14 MG/24HR 24 hr patch Place 1 patch onto the skin daily 6/20/2024 Yes Opal Zurita PA-C     oxyCODONE (ROXICODONE) 5 MG tablet Take 1 tablet (5 mg) by mouth 2 times daily as needed for moderate pain 6/21/2024 Yes Rose Dias MD     rosuvastatin (CRESTOR) 10 MG tablet Take 1 tablet (10 mg) by mouth at bedtime 6/20/2024 Yes Opal Zurita PA-C Yes    acetaminophen (TYLENOL) 500 MG tablet Take 1-2 tablets (500-1,000 mg) by mouth every 6 hours as needed for mild pain   Opal Zurita PA-C No    budesonide-formoterol (SYMBICORT) 160-4.5 MCG/ACT Inhaler Inhale 2 puffs into the lungs two times daily Disp 3 inhalers New Rx at not picked up yet  Vasquez Benoit MD Yes    bumetanide (BUMEX) 2 MG tablet Take 1 tablet (2 mg) by mouth daily Rx not filled  Opal Zurita PA-C Yes    famotidine (PEPCID) 20 MG tablet Take 1 tablet (20 mg) by mouth 2 times daily Rx not filled  Vasquez Benoit MD     hydrALAZINE (APRESOLINE) 10 MG tablet Take 1 tablet (10 mg) by mouth 4 times daily Don't think Rx filled  Opal Zurita PA-C Yes    miconazole (MICATIN) 2 % external powder Apply topically 2 times daily   Opal Zurita PA-C     nicotine (NICODERM CQ) 14 MG/24HR 24 hr patch Place 1 patch onto the skin every 24 hours   Kaylee Liu, NP     nitroGLYcerin (NITROSTAT) 0.4 MG sublingual tablet Place 1 tablet (0.4 mg) under the tongue See Admin Instructions for chest pain   Vasquez Benoit MD Yes    ondansetron (ZOFRAN ODT) 4 MG ODT tab Take 1 tablet (4 mg) by mouth every 6 hours as needed for nausea or vomiting   Tanner Maurer MD     polyethylene glycol (MIRALAX) 17 GM/Dose powder Take 17 g by mouth daily   Opal Zurita PA-C No     saccharomyces boulardii (FLORASTOR) 250 MG capsule Take 1 capsule (250 mg) by mouth 2 times daily Unsure  Opal Zurita PA-C     silver sulfADIAZINE (SILVADENE) 1 % external cream Apply topically daily   Corrina Hurt PA-C     wound support modular (EXPEDITE) LIQD bottle Take 60 mLs by mouth daily   Opal Zurita PA-C

## 2024-06-21 NOTE — ED TRIAGE NOTES
Pt got an out patient Xray today as a follow up from being discharged after right lower limb amputation. Her doctor told her to go to the ER due to fluid on the left lung. Pt also reports being swollen in the left axillary region and entire left leg. Pt has been more short of breath recently.

## 2024-06-22 ENCOUNTER — APPOINTMENT (OUTPATIENT)
Dept: ULTRASOUND IMAGING | Facility: CLINIC | Age: 64
DRG: 205 | End: 2024-06-22
Attending: INTERNAL MEDICINE
Payer: COMMERCIAL

## 2024-06-22 ENCOUNTER — APPOINTMENT (OUTPATIENT)
Dept: GENERAL RADIOLOGY | Facility: CLINIC | Age: 64
DRG: 205 | End: 2024-06-22
Attending: NURSE PRACTITIONER
Payer: COMMERCIAL

## 2024-06-22 ENCOUNTER — APPOINTMENT (OUTPATIENT)
Dept: CARDIOLOGY | Facility: CLINIC | Age: 64
DRG: 205 | End: 2024-06-22
Attending: HOSPITALIST
Payer: COMMERCIAL

## 2024-06-22 LAB
% LINING CELLS, BODY FLUID: 10 %
ANION GAP SERPL CALCULATED.3IONS-SCNC: 13 MMOL/L (ref 7–15)
APPEARANCE FLD: CLEAR
APPEARANCE FLD: CLEAR
ATRIAL RATE - MUSE: 58 BPM
BUN SERPL-MCNC: 90.1 MG/DL (ref 8–23)
CALCIUM SERPL-MCNC: 7.8 MG/DL (ref 8.8–10.2)
CELL COUNT BODY FLUID SOURCE: NORMAL
CELL COUNT BODY FLUID SOURCE: NORMAL
CHLORIDE SERPL-SCNC: 99 MMOL/L (ref 98–107)
CK SERPL-CCNC: 42 U/L (ref 26–192)
COLOR FLD: YELLOW
COLOR FLD: YELLOW
CREAT SERPL-MCNC: 3.16 MG/DL (ref 0.51–0.95)
DEPRECATED HCO3 PLAS-SCNC: 16 MMOL/L (ref 22–29)
DIASTOLIC BLOOD PRESSURE - MUSE: NORMAL MMHG
EGFRCR SERPLBLD CKD-EPI 2021: 16 ML/MIN/1.73M2
ERYTHROCYTE [DISTWIDTH] IN BLOOD BY AUTOMATED COUNT: 14.1 % (ref 10–15)
GLUCOSE BLDC GLUCOMTR-MCNC: 134 MG/DL (ref 70–99)
GLUCOSE BLDC GLUCOMTR-MCNC: 145 MG/DL (ref 70–99)
GLUCOSE BLDC GLUCOMTR-MCNC: 75 MG/DL (ref 70–99)
GLUCOSE BLDC GLUCOMTR-MCNC: 99 MG/DL (ref 70–99)
GLUCOSE SERPL-MCNC: 84 MG/DL (ref 70–99)
HCT VFR BLD AUTO: 27.2 % (ref 35–47)
HGB BLD-MCNC: 8.9 G/DL (ref 11.7–15.7)
HGB BLD-MCNC: 9.1 G/DL (ref 11.7–15.7)
INTERPRETATION ECG - MUSE: NORMAL
LVEF ECHO: NORMAL
LYMPHOCYTES NFR FLD MANUAL: 20 %
MAGNESIUM SERPL-MCNC: 1.8 MG/DL (ref 1.7–2.3)
MCH RBC QN AUTO: 31.4 PG (ref 26.5–33)
MCHC RBC AUTO-ENTMCNC: 32.7 G/DL (ref 31.5–36.5)
MCV RBC AUTO: 96 FL (ref 78–100)
MONOS+MACROS NFR FLD MANUAL: 28 %
NEUTS BAND NFR FLD MANUAL: 42 %
P AXIS - MUSE: 41 DEGREES
PLATELET # BLD AUTO: 247 10E3/UL (ref 150–450)
POTASSIUM SERPL-SCNC: 5.3 MMOL/L (ref 3.4–5.3)
POTASSIUM SERPL-SCNC: 5.7 MMOL/L (ref 3.4–5.3)
POTASSIUM SERPL-SCNC: 7 MMOL/L (ref 3.4–5.3)
POTASSIUM SERPL-SCNC: 7.1 MMOL/L (ref 3.4–5.3)
POTASSIUM SERPL-SCNC: 7.1 MMOL/L (ref 3.4–5.3)
PR INTERVAL - MUSE: 164 MS
QRS DURATION - MUSE: 78 MS
QT - MUSE: 396 MS
QTC - MUSE: 388 MS
R AXIS - MUSE: -71 DEGREES
RBC # BLD AUTO: 2.83 10E6/UL (ref 3.8–5.2)
SODIUM SERPL-SCNC: 128 MMOL/L (ref 135–145)
SYSTOLIC BLOOD PRESSURE - MUSE: NORMAL MMHG
T AXIS - MUSE: 69 DEGREES
TRIGL FLD-MCNC: 15 MG/DL
TRIGLYCERIDE BODY FLUID SOURCE: NORMAL
TSH SERPL DL<=0.005 MIU/L-ACNC: 2.07 UIU/ML (ref 0.3–4.2)
VENTRICULAR RATE- MUSE: 58 BPM
WBC # BLD AUTO: 9.3 10E3/UL (ref 4–11)
WBC # FLD AUTO: 503 /UL
WBC # FLD AUTO: NORMAL 10*3/UL

## 2024-06-22 PROCEDURE — 120N000013 HC R&B IMCU

## 2024-06-22 PROCEDURE — 999N000208 ECHOCARDIOGRAM COMPLETE

## 2024-06-22 PROCEDURE — 83735 ASSAY OF MAGNESIUM: CPT | Performed by: INTERNAL MEDICINE

## 2024-06-22 PROCEDURE — 85018 HEMOGLOBIN: CPT | Performed by: HOSPITALIST

## 2024-06-22 PROCEDURE — 250N000013 HC RX MED GY IP 250 OP 250 PS 637: Performed by: STUDENT IN AN ORGANIZED HEALTH CARE EDUCATION/TRAINING PROGRAM

## 2024-06-22 PROCEDURE — 250N000011 HC RX IP 250 OP 636: Mod: JZ | Performed by: INTERNAL MEDICINE

## 2024-06-22 PROCEDURE — 250N000009 HC RX 250: Performed by: RADIOLOGY

## 2024-06-22 PROCEDURE — 99291 CRITICAL CARE FIRST HOUR: CPT | Performed by: NURSE PRACTITIONER

## 2024-06-22 PROCEDURE — 93306 TTE W/DOPPLER COMPLETE: CPT | Mod: 26 | Performed by: INTERNAL MEDICINE

## 2024-06-22 PROCEDURE — 84443 ASSAY THYROID STIM HORMONE: CPT | Performed by: INTERNAL MEDICINE

## 2024-06-22 PROCEDURE — 272N000451 HC KIT SHRLOCK 5FR POWER PICC DOUBLE LUMEN

## 2024-06-22 PROCEDURE — 84132 ASSAY OF SERUM POTASSIUM: CPT | Performed by: STUDENT IN AN ORGANIZED HEALTH CARE EDUCATION/TRAINING PROGRAM

## 2024-06-22 PROCEDURE — 250N000009 HC RX 250: Performed by: STUDENT IN AN ORGANIZED HEALTH CARE EDUCATION/TRAINING PROGRAM

## 2024-06-22 PROCEDURE — 85018 HEMOGLOBIN: CPT | Performed by: NURSE PRACTITIONER

## 2024-06-22 PROCEDURE — 999N000157 HC STATISTIC RCP TIME EA 10 MIN

## 2024-06-22 PROCEDURE — 0W9B3ZZ DRAINAGE OF LEFT PLEURAL CAVITY, PERCUTANEOUS APPROACH: ICD-10-PCS | Performed by: RADIOLOGY

## 2024-06-22 PROCEDURE — 258N000001 HC RX 258: Performed by: STUDENT IN AN ORGANIZED HEALTH CARE EDUCATION/TRAINING PROGRAM

## 2024-06-22 PROCEDURE — 84132 ASSAY OF SERUM POTASSIUM: CPT | Performed by: HOSPITALIST

## 2024-06-22 PROCEDURE — 99222 1ST HOSP IP/OBS MODERATE 55: CPT | Mod: 25 | Performed by: INTERNAL MEDICINE

## 2024-06-22 PROCEDURE — 36415 COLL VENOUS BLD VENIPUNCTURE: CPT | Performed by: STUDENT IN AN ORGANIZED HEALTH CARE EDUCATION/TRAINING PROGRAM

## 2024-06-22 PROCEDURE — 255N000002 HC RX 255 OP 636: Performed by: HOSPITALIST

## 2024-06-22 PROCEDURE — 250N000013 HC RX MED GY IP 250 OP 250 PS 637: Performed by: HOSPITALIST

## 2024-06-22 PROCEDURE — 32555 ASPIRATE PLEURA W/ IMAGING: CPT

## 2024-06-22 PROCEDURE — 99223 1ST HOSP IP/OBS HIGH 75: CPT | Performed by: STUDENT IN AN ORGANIZED HEALTH CARE EDUCATION/TRAINING PROGRAM

## 2024-06-22 PROCEDURE — 250N000012 HC RX MED GY IP 250 OP 636 PS 637: Performed by: STUDENT IN AN ORGANIZED HEALTH CARE EDUCATION/TRAINING PROGRAM

## 2024-06-22 PROCEDURE — 99231 SBSQ HOSP IP/OBS SF/LOW 25: CPT | Mod: 24 | Performed by: SURGERY

## 2024-06-22 PROCEDURE — 80048 BASIC METABOLIC PNL TOTAL CA: CPT | Performed by: HOSPITALIST

## 2024-06-22 PROCEDURE — 84132 ASSAY OF SERUM POTASSIUM: CPT | Performed by: INTERNAL MEDICINE

## 2024-06-22 PROCEDURE — 82550 ASSAY OF CK (CPK): CPT | Performed by: INTERNAL MEDICINE

## 2024-06-22 PROCEDURE — 258N000003 HC RX IP 258 OP 636: Mod: JZ | Performed by: STUDENT IN AN ORGANIZED HEALTH CARE EDUCATION/TRAINING PROGRAM

## 2024-06-22 PROCEDURE — 36415 COLL VENOUS BLD VENIPUNCTURE: CPT | Performed by: HOSPITALIST

## 2024-06-22 PROCEDURE — 36415 COLL VENOUS BLD VENIPUNCTURE: CPT | Performed by: INTERNAL MEDICINE

## 2024-06-22 PROCEDURE — 36415 COLL VENOUS BLD VENIPUNCTURE: CPT | Performed by: NURSE PRACTITIONER

## 2024-06-22 PROCEDURE — 93010 ELECTROCARDIOGRAM REPORT: CPT | Performed by: INTERNAL MEDICINE

## 2024-06-22 PROCEDURE — 93005 ELECTROCARDIOGRAM TRACING: CPT

## 2024-06-22 PROCEDURE — 250N000011 HC RX IP 250 OP 636: Mod: JZ | Performed by: STUDENT IN AN ORGANIZED HEALTH CARE EDUCATION/TRAINING PROGRAM

## 2024-06-22 PROCEDURE — P9047 ALBUMIN (HUMAN), 25%, 50ML: HCPCS | Mod: JZ | Performed by: STUDENT IN AN ORGANIZED HEALTH CARE EDUCATION/TRAINING PROGRAM

## 2024-06-22 PROCEDURE — 36569 INSJ PICC 5 YR+ W/O IMAGING: CPT

## 2024-06-22 PROCEDURE — 89050 BODY FLUID CELL COUNT: CPT | Performed by: INTERNAL MEDICINE

## 2024-06-22 PROCEDURE — 250N000013 HC RX MED GY IP 250 OP 250 PS 637: Performed by: NURSE PRACTITIONER

## 2024-06-22 PROCEDURE — 99233 SBSQ HOSP IP/OBS HIGH 50: CPT | Performed by: INTERNAL MEDICINE

## 2024-06-22 PROCEDURE — 71045 X-RAY EXAM CHEST 1 VIEW: CPT

## 2024-06-22 RX ORDER — SODIUM POLYSTYRENE SULFONATE 15 G/60ML
15 SUSPENSION ORAL; RECTAL ONCE
Status: COMPLETED | OUTPATIENT
Start: 2024-06-22 | End: 2024-06-22

## 2024-06-22 RX ORDER — ACETAMINOPHEN 650 MG/1
650 SUPPOSITORY RECTAL EVERY 4 HOURS PRN
Status: DISCONTINUED | OUTPATIENT
Start: 2024-06-22 | End: 2024-07-08 | Stop reason: HOSPADM

## 2024-06-22 RX ORDER — DEXTROSE MONOHYDRATE 25 G/50ML
25 INJECTION, SOLUTION INTRAVENOUS ONCE
Status: COMPLETED | OUTPATIENT
Start: 2024-06-22 | End: 2024-06-22

## 2024-06-22 RX ORDER — ALBUMIN (HUMAN) 12.5 G/50ML
25 SOLUTION INTRAVENOUS EVERY 6 HOURS
Status: COMPLETED | OUTPATIENT
Start: 2024-06-22 | End: 2024-06-23

## 2024-06-22 RX ORDER — NICOTINE POLACRILEX 4 MG
15-30 LOZENGE BUCCAL
Status: DISCONTINUED | OUTPATIENT
Start: 2024-06-22 | End: 2024-07-08 | Stop reason: HOSPADM

## 2024-06-22 RX ORDER — LIDOCAINE HYDROCHLORIDE 10 MG/ML
10 INJECTION, SOLUTION EPIDURAL; INFILTRATION; INTRACAUDAL; PERINEURAL ONCE
Status: COMPLETED | OUTPATIENT
Start: 2024-06-22 | End: 2024-06-22

## 2024-06-22 RX ORDER — CALCIUM GLUCONATE 94 MG/ML
1 INJECTION, SOLUTION INTRAVENOUS ONCE
Status: COMPLETED | OUTPATIENT
Start: 2024-06-22 | End: 2024-06-22

## 2024-06-22 RX ORDER — BUMETANIDE 0.25 MG/ML
4 INJECTION INTRAMUSCULAR; INTRAVENOUS ONCE
Status: COMPLETED | OUTPATIENT
Start: 2024-06-22 | End: 2024-06-22

## 2024-06-22 RX ORDER — LIDOCAINE 40 MG/G
CREAM TOPICAL
Status: ACTIVE | OUTPATIENT
Start: 2024-06-22 | End: 2024-06-25

## 2024-06-22 RX ORDER — ACETAMINOPHEN 325 MG/1
650 TABLET ORAL EVERY 4 HOURS PRN
Status: DISCONTINUED | OUTPATIENT
Start: 2024-06-22 | End: 2024-07-08 | Stop reason: HOSPADM

## 2024-06-22 RX ORDER — SODIUM POLYSTYRENE SULFONATE 15 G/60ML
30 SUSPENSION ORAL; RECTAL ONCE
Status: COMPLETED | OUTPATIENT
Start: 2024-06-22 | End: 2024-06-22

## 2024-06-22 RX ORDER — FUROSEMIDE 10 MG/ML
100 INJECTION INTRAMUSCULAR; INTRAVENOUS EVERY 12 HOURS
Status: DISCONTINUED | OUTPATIENT
Start: 2024-06-22 | End: 2024-06-23

## 2024-06-22 RX ORDER — CHLOROTHIAZIDE SODIUM 500 MG/1
500 INJECTION INTRAVENOUS ONCE
Status: COMPLETED | OUTPATIENT
Start: 2024-06-22 | End: 2024-06-22

## 2024-06-22 RX ORDER — DEXTROSE MONOHYDRATE 25 G/50ML
25-50 INJECTION, SOLUTION INTRAVENOUS
Status: DISCONTINUED | OUTPATIENT
Start: 2024-06-22 | End: 2024-07-08 | Stop reason: HOSPADM

## 2024-06-22 RX ADMIN — NICOTINE 1 PATCH: 14 PATCH, EXTENDED RELEASE TRANSDERMAL at 08:23

## 2024-06-22 RX ADMIN — CALCIUM CARBONATE (ANTACID) CHEW TAB 500 MG 1000 MG: 500 CHEW TAB at 18:23

## 2024-06-22 RX ADMIN — SODIUM CHLORIDE 6 UNITS: 9 INJECTION, SOLUTION INTRAVENOUS at 17:22

## 2024-06-22 RX ADMIN — Medication 250 MG: at 08:22

## 2024-06-22 RX ADMIN — ACETAMINOPHEN 650 MG: 325 TABLET, FILM COATED ORAL at 02:44

## 2024-06-22 RX ADMIN — HUMAN ALBUMIN MICROSPHERES AND PERFLUTREN 9 ML: 10; .22 INJECTION, SOLUTION INTRAVENOUS at 12:34

## 2024-06-22 RX ADMIN — ROSUVASTATIN CALCIUM 10 MG: 10 TABLET, FILM COATED ORAL at 21:59

## 2024-06-22 RX ADMIN — LIDOCAINE HYDROCHLORIDE 2 ML: 10 INJECTION, SOLUTION EPIDURAL; INFILTRATION; INTRACAUDAL; PERINEURAL at 20:15

## 2024-06-22 RX ADMIN — CHLOROTHIAZIDE SODIUM 500 MG: 500 INJECTION, POWDER, LYOPHILIZED, FOR SOLUTION INTRAVENOUS at 15:15

## 2024-06-22 RX ADMIN — SODIUM BICARBONATE 50 MEQ: 84 INJECTION, SOLUTION INTRAVENOUS at 17:04

## 2024-06-22 RX ADMIN — LIDOCAINE HYDROCHLORIDE 10 ML: 10 INJECTION, SOLUTION EPIDURAL; INFILTRATION; INTRACAUDAL; PERINEURAL at 13:50

## 2024-06-22 RX ADMIN — SODIUM POLYSTYRENE SULFONATE 30 G: 15 SUSPENSION ORAL; RECTAL at 22:00

## 2024-06-22 RX ADMIN — DEXTROSE MONOHYDRATE 300 ML: 100 INJECTION, SOLUTION INTRAVENOUS at 18:34

## 2024-06-22 RX ADMIN — Medication 250 MG: at 22:00

## 2024-06-22 RX ADMIN — DEXTROSE MONOHYDRATE 25 G: 25 INJECTION, SOLUTION INTRAVENOUS at 17:18

## 2024-06-22 RX ADMIN — SODIUM POLYSTYRENE SULFONATE 15 G: 15 SUSPENSION ORAL; RECTAL at 03:30

## 2024-06-22 RX ADMIN — BUMETANIDE 0.5 MG/HR: 0.25 INJECTION INTRAMUSCULAR; INTRAVENOUS at 21:59

## 2024-06-22 RX ADMIN — CALCIUM GLUCONATE 1 G: 98 INJECTION, SOLUTION INTRAVENOUS at 17:14

## 2024-06-22 RX ADMIN — CLOPIDOGREL BISULFATE 75 MG: 75 TABLET ORAL at 11:03

## 2024-06-22 RX ADMIN — SODIUM ZIRCONIUM CYCLOSILICATE 10 G: 5 POWDER, FOR SUSPENSION ORAL at 17:42

## 2024-06-22 RX ADMIN — FLUTICASONE FUROATE AND VILANTEROL TRIFENATATE 1 PUFF: 200; 25 POWDER RESPIRATORY (INHALATION) at 11:03

## 2024-06-22 RX ADMIN — SODIUM ZIRCONIUM CYCLOSILICATE 5 G: 5 POWDER, FOR SUSPENSION ORAL at 00:13

## 2024-06-22 RX ADMIN — BUMETANIDE 4 MG: 0.25 INJECTION INTRAMUSCULAR; INTRAVENOUS at 15:14

## 2024-06-22 RX ADMIN — ACETAMINOPHEN 650 MG: 325 TABLET, FILM COATED ORAL at 08:22

## 2024-06-22 RX ADMIN — FUROSEMIDE 100 MG: 10 INJECTION, SOLUTION INTRAMUSCULAR; INTRAVENOUS at 08:22

## 2024-06-22 RX ADMIN — ALBUMIN HUMAN 25 G: 0.25 SOLUTION INTRAVENOUS at 17:28

## 2024-06-22 RX ADMIN — OXYCODONE HYDROCHLORIDE 5 MG: 5 TABLET ORAL at 18:35

## 2024-06-22 RX ADMIN — SODIUM ZIRCONIUM CYCLOSILICATE 10 G: 5 POWDER, FOR SUSPENSION ORAL at 11:02

## 2024-06-22 ASSESSMENT — ACTIVITIES OF DAILY LIVING (ADL)
ADLS_ACUITY_SCORE: 41
ADLS_ACUITY_SCORE: 40
ADLS_ACUITY_SCORE: 41
ADLS_ACUITY_SCORE: 40
ADLS_ACUITY_SCORE: 41
ADLS_ACUITY_SCORE: 41
ADLS_ACUITY_SCORE: 40
ADLS_ACUITY_SCORE: 41
ADLS_ACUITY_SCORE: 40
ADLS_ACUITY_SCORE: 41
ADLS_ACUITY_SCORE: 40
ADLS_ACUITY_SCORE: 41

## 2024-06-22 NOTE — PROVIDER NOTIFICATION
"MD Notification    Notified Person: MD    Notified Person Name: Devin Ravi    Notification Date/Time: 6/22/24 1051    Notification Interaction: matt    Purpose of Notification: \"3[address] Ma,Ke I see amlodipine was held, I havent given morning dose, do you want me to hold the morning dose? Also how frequently do you want potassium drawn, currently not scheduled?\"    Orders Received:    Comments: \"Hold amlodipine, and q2h potassium checks x3    "

## 2024-06-22 NOTE — CODE/RAPID RESPONSE
[facility]    RRT Note  6/22/2024   Time Called: 0029    RRT called for hypoxia    Code Status: Full Code      ASSESSMENT & PLAN  Acute hypoxic respiratory failure, s/p thoracentesis (800 mL of clear yellow pleural fluid was removed)  Sinus bradycardia with PVCs and prolonged QT  Shirley Hendricks is a 63 year old female who has complex medical history which includes GERD, hypertension, history of B-cell lymphoma, hyperlipidemia, depression, anxiety, MI, peripheral vascular disease status angioplasty, DLE, COPD, CMT disease, tobacco use, CKD stage III with baseline creatinine around 1.9-2.8, who presented to the ER from her clinic as she was told that she has a lot of fluid around her lungs and diagnosed with significantly large left pleural effusion, moderate right pleural effusion, NSTEMI, hyperkalemia, hyponatremia and admitted on 6/21/24     I was paged to the bedside to evaluate Shirley Hendricsk for an acute episode of hypoxia with SpO2 as low as 80%. Per RN, patient was on 4L of supplemental O2 when she arrived to the floor. Prior to coming to [facility] patient had ultrasound-guided thoracentesis with 800 mL of clear yellow fluid removed.  CT chest completed while patient was in the ED which revealed an occlusion of left mainstem bronchus, in addition to a large left pleural effusion with complete collapse of the left upper and left lower. Interventional pulmonologist rounding tomorrow, 6/22, and further concerns about occlusion of the bronchus will be addressed at that time.     Upon my arrival, the patient was resting comfortably in bed, Oxymask in place. In no overt distress. Her biggest complaint was feeling cold. Patient's SpO2 ranged from 80% to 89% with Oxymask. Lungs clear to auscultation on the right, slightly diminished on the left. Despite low SpO2, patient alert and oriented x4. Denies new or worsening pain, chest pain, denies feeling SOB, headache, nausea, dizziness.  HFNC initiated, patient's SpO2 improved to low 90s with high flow in place. Bradycardia on monitor, suspect 2/2 hyperkalemia. Repeat K+ to be drawn around 0200.     Temp: (!) 93.6  F (34.2  C) Temp src: Axillary BP: 114/62 Pulse: 53   Resp: 17 SpO2: (!) 84 % O2 Device: Oxymask Oxygen Delivery: 15 LPM    PLAN:   -STAT CXR  -HFNC  -Repeat potassium and hemoglobin. Recent baseline hemoglobin has been fluctuating and has been between 8.4-10.1, suspect anemia of chronic disease, however, given hypoxia and hypothermia will recheck hemoglobin      At the conclusion of this RRT patient was hemodynamically stable and will remain on current unit    Discussed with and defer further cares to bedside nursing staff.    NOELLE Hair [facility]  Securely message with the Vocera Web Console (learn more here)  Text page via [employer] Paging/Directory        Physical Exam   Vital Signs with Ranges:  Temp:  [93.6  F (34.2  C)-97.6  F (36.4  C)] 93.6  F (34.2  C)  Pulse:  [49-66] 53  Resp:  [12-20] 17  BP: (114-133)/(56-76) 114/62  SpO2:  [84 %-99 %] 84 %  I/O last 3 completed shifts:  In: 228.75 [I.V.:228.75]  Out: -     Physical Exam  Vitals and nursing note reviewed.   HENT:      Mouth/Throat:      Mouth: Mucous membranes are moist.   Eyes:      Pupils: Pupils are equal, round, and reactive to light.   Cardiovascular:      Rate and Rhythm: Bradycardia present.   Pulmonary:      Effort: Pulmonary effort is normal.      Breath sounds: Examination of the left-upper field reveals decreased breath sounds. Examination of the left-middle field reveals decreased breath sounds. Examination of the left-lower field reveals decreased breath sounds. Decreased breath sounds present.   Abdominal:      General: There is no distension.      Palpations: Abdomen is soft.      Tenderness: There is no abdominal tenderness.   Skin:     Capillary Refill: Capillary refill takes 2 to 3 seconds.      Coloration: Skin is  pale.   Neurological:      General: No focal deficit present.      Mental Status: She is alert and oriented to person, place, and time.             Data   IMAGING: (X-ray/CT/MRI)   Recent Results (from the past 24 hour(s))   XR Chest 2 Views    Narrative    XR CHEST 2 VIEWS 6/21/2024 10:13 AM    HISTORY: SOB. Left dullness. ? effusion; SOB (shortness of breath)    COMPARISON: 4/3/2024 and 4/2/2024      Impression    IMPRESSION: Complete opacification of the left hemithorax which may be  due to a large left pleural effusion or complete atelectasis of the  left lung. Please correlate with ultrasound. Small right pleural  effusion.    BRANDON ALBERTS MD         SYSTEM ID:  [id number]   Chest CT w/o contrast    Narrative    EXAM: CT CHEST W/O CONTRAST  LOCATION: [facility]  DATE: 6/21/2024    INDICATION: Left lung white out on recent chest x-ray, and SOB  COMPARISON: Chest x-ray 6/21/2024 and CT abdomen and pelvis 05/02/2024  TECHNIQUE: CT chest without IV contrast. Multiplanar reformats were obtained. Dose reduction techniques were used.  CONTRAST: None.    FINDINGS:   LUNGS AND PLEURA: A large left pleural effusion has developed since the prior studies, with complete compressive atelectasis/collapse involving the left upper and lower lobes. Moderate size right pleural effusion and subsegmental atelectasis involving a   moderate portion of the right lower lobe. Minimal scarring or thickening right upper lobe.    MEDIASTINUM/AXILLAE: The distal left mainstem bronchus is occluded No lymphadenopathy. No thoracic aortic aneurysm. Atherosclerotic disease thoracic aorta. Plaque at the origins of the great vessels arising from the arch.    CORONARY ARTERY CALCIFICATION: Severe.    UPPER ABDOMEN: Severe atherosclerotic disease abdominal aorta and branch vessels, incompletely evaluated. Cholecystectomy.    MUSCULOSKELETAL: Edematous changes subcutaneous tissues. Hypertrophic changes thoracic spine.       Impression    IMPRESSION:   1.  Large left pleural effusion with complete collapse of the left upper and left lower lobes. This would be amenable for ultrasound-guided thoracentesis. There is also occlusion of the left mainstem bronchus. Pulmonary consultation recommended.  2.  Moderate-sized right pleural effusion and subsegmental atelectasis right lower lobe.  3.  Diffuse edema subcutaneous tissues.  4.  Severe atherosclerotic disease.     US Thoracentesis    [facility] RADIOLOGY  DATE: 6/21/2024    PROCEDURE: IMAGING GUIDED LEFT THORACENTESIS    INTERVENTIONAL RADIOLOGIST: Edelmira Hurt M.D.    INDICATION: Left pleural effusion.    CONSENT: The risks, benefits and alternatives of an imaging guided  thoracentesis were discussed with the patient  in detail. All  questions were answered. Informed consent was given to proceed with  the procedure.    MODERATE SEDATION: None.    COMPLICATIONS: No immediate complications.    PROCEDURE:    A limited ultrasound was performed for localization purposes.    Using sterile technique 10 mL of lidocaine was infused into the local  soft tissues. Under direct ultrasound guidance a 5 Chinese catheter was  inserted into the pleural effusion.    A total of 800 mL of clear yellow pleural fluid was removed and sent  to the lab if diagnostic analysis was requested.    FINDINGS:  The initial ultrasound shows a pleural effusion.    Images obtained during catheter placement show the access needle with  tip in the pleural fluid.      Impression    IMPRESSION:    Successful ultrasound-guided thoracentesis, as discussed above.      EDELMIRA HURT MD         SYSTEM ID:  [id number]       CBC with Diff:  Recent Labs   Lab Test 06/21/24  1449 04/13/24  0655 04/11/24  0643   WBC 4.7   < > 5.0   HGB 8.4*   < > 8.4*   MCV 97   < > 86      < > 151   INR  --   --  1.31*    < > = values in this interval not displayed.          Comprehensive Metabolic Panel:  Recent Labs   Lab  06/21/24  2357 06/21/24  2100 06/21/24  1958/24  1802 06/21/24  1749 06/21/24  1610 06/21/24  1449   NA  --   --   --   --  128*  --  129*   POTASSIUM  --   --  5.9*  --  6.4*   < > 6.8*   CHLORIDE  --   --   --   --  99  --  99   CO2  --   --   --   --  17*  --  18*   ANIONGAP  --   --   --   --  12  --  12   GLC 99   < > 122*   < > 114*   < > 116*   BUN  --   --   --   --  90.3*  --  87.4*   CR  --   --   --   --  3.07*  --  3.01*   GFRESTIMATED  --   --   --   --  16*  --  17*   SONIA  --   --   --   --  8.5*  --  7.7*   PROTTOTAL  --   --   --   --  5.6*  --  5.2*   ALBUMIN  --   --   --   --   --   --  2.4*   BILITOTAL  --   --   --   --   --   --  <0.2   ALKPHOS  --   --   --   --   --   --  63   AST  --   --   --   --   --   --  27   ALT  --   --   --   --   --   --  28    < > = values in this interval not displayed.         Time Spent on this Encounter     Medical Decision Making                CRITICAL CARE TIME  I spent 40 minutes of critical care time on the unit/floor managing the care of Shirley Hendricks. Upon evaluation, this patient had a high probability of imminent or life-threatening deterioration due to acute hypoxic respiratory failure which required my direct attention, intervention, and personal management. 100% of my time was spent at the bedside counseling the patient and/or coordinating care regarding services listed in this note.

## 2024-06-22 NOTE — PROGRESS NOTES
[facility]    Hospitalist Progress Note    Interval History   - Note RRT overnight for respiratory failure. On HFNC satting 88-90% and mildly dyspneic still, discussing with IR to remove more fluid today  - K 7.1, per Nephrology, who is aggressively diuresing. Patient does appear significantly volume overloaded  Addendum: Discussed with nephrology, patient is anuric, potassium likely to improve with lokelma, may need dialysis as early as tomorrow if remains anuric    Assessment & Plan   Summary: Shirley Hendricks is a 63 year old female with PMH CAD, MI, peripheral vascular disease status angioplasty and right AKA with wound vac, DLE, COPD, CMT disease, tobacco use, CKD stage III, GERD, hypertension, history of B-cell lymphoma, hyperlipidemia, depression, anxiety, who was admitted on 6/21/2024 with acute hypoxic respiratory failure due to a large left pleural effusion, hyperkalemia, and hyponatremia.    Acute hypoxic respiratory failure  Large left pleural effusion s/p thoracentesis 800mL 6/22, transudative  Right moderate pleural effusion  Patient with increasing shortness of breath for 1-2 weeks PTA. CXR 6/21 in ED reveals complete opacification of the left hemithorax. CT chest-large left pleural effusion with complete collapse of the left upper and left lower and occlusion of left mainstem bronchus and a moderate size right pleural effusion. Patient underwent thoracentesis 800mL on 6/21, fluid appears transudative. Patient appears markedly volume overloaded.  In the ED she initially required 2L O2 but was transitioned to HFNC overnight 6/22 to 6/23 during RRT.  Discussed with ICU attending and they mentioned that Dr. Rogers who is also interventional pulmonary will be rounding in the ICU on 6/22/24 and AM team can touch base with her if there are issues or ongoing concerns about occlusion of the bronchus.  - Pulmonology consulted, likely to see Monday 6/24  - Continue IMC  - Discussing  with IR, Repeat thoracentesis with repeat Cell count and check cytology  - Recheck CXR tomorrow    Acute congestive heart failure exacerbation, systolic vs diastolic  Elevated troponin suspect due to CHF  Patient presents with large left pleural effusion, BNP  elevated to 11.9k. Note history of stress cardiomyopathy in Oct 2023 with EF 30-35% which then resolved on echo 2023. Patient had a recent AKA in 2024 with complicated post op course 2024, and reports   2024.   Patient does not have any significant chest discomfort and troponin are 174 and 177 and trending down to 156  - Cardiology consulted  - Diuresis per Nephrology, see below  - Echocardiogram pending  - Tele, weights, I&O    Severe hyperkalemia  CKD with recent acute renal failure in May 2024 needing hemodialysis  Hyponatremia  Metabolic acidosis  Recent baseline creatinine has been fluctuating between 1.9-2.8. On admission Creatinine is 3.07, potassium is 7.5, sodium of 128 with bicarb of 17.   In the ER patient did receive albuterol, 2 g calcium gluconate, insulin, Kayexalate 10 g and 40 IV Lasix and 1 L IV fluid bolus. EKG showed sinus bradycardia with prolonged QT  Potassium has been elevated to 7.1 since admission.  - Appreciate Neprhology consult  - [facility]  - [facility]    Hypertension  Hyperlipidemia  History of coronary disease with an MI in 2019  Left heart cath on 10/4/2023 showed-completely occluded D1 with left to left collaterals from distal LAD to D1, moderate CAD involving proximal to mid RCA not significant by IFR and moderate coronary disease please involving distal small caliber RPL and distal circumflex artery. Last echo  showed EF of 55 to 60% with normal RV and moderate trileaflet aortic sclerosis  - Hold amlodipine to make room for diuresis and possible CHF management  - Hold Coreg due to relative bradycardia    History of peripheral vascular disease status above-knee amputation on the right with wound  "dressing status washout on 5/24  Neuropathic pain  Patient has followed with vascular surgery and has noticed during last hospitalization in May 2024 she underwent washout with wound VAC placement and was discharged on Plavix and Crestor. On exam the amputation stump does not look infected and has wound vac in place   - Continue with PTA Plavix 75 mg daily  - Gabapentin at reduced renal dosing    COPD not in exacerbation  - On exam has no wheezing and continue with as needed symbicort    GERD  - Continue with PTA Pepcid    Anemia of chronic disease  - Recent baseline hemoglobin has been fluctuating and has been between 8.4-10.1  - Hemoglobin stable at 8.4  - Continue to monitor and likely due to anemia of chronic disease    History of B-cell lymphoma  - Follows with [place] [facility]  - Check cytology with thoracentesis above    Tobacco use  - Continue with nicotine patch    Clinically Significant Risk Factors Present on Admission        # Hyperkalemia: Highest K = 7.5 mmol/L in last 2 days, will monitor as appropriate  # Hyponatremia: Lowest Na = 128 mmol/L in last 2 days, will monitor as appropriate      # Hypoalbuminemia: Lowest albumin = 2.4 g/dL at 6/21/2024  2:49 PM, will monitor as appropriate   # Drug Induced Platelet Defect: home medication list includes an antiplatelet medication   # Hypertension: Noted on problem list   # Non-Invasive mechanical ventilation: current O2 Device: High Flow Nasal Cannula (HFNC)  # Acute hypoxic respiratory failure: continue supplemental O2 as needed   # Anemia: based on hgb <11           # Overweight: Estimated body mass index is 25.12 kg/m  as calculated from the following:    Height as of an earlier encounter on 6/21/24: 1.549 m (5' 1\").    Weight as of 6/5/24: 60.3 kg (132 lb 15 oz).       # Financial/Environmental Concerns:            PT/OT: ordered  Diet: NPO per Anesthesia Guidelines for Procedure/Surgery Except for: Meds    DVT Prophylaxis: none  Alvarez Catheter: Not " present  Lines: None     Cardiac Monitoring: ACTIVE order. Indication: Electrolyte Imbalance (24 hours)- Magnesium <1.3 mg/ml; Potassium < =2.8 or > 5.5 mg/ml  Code Status: Full Code    Medically Ready for Discharge: Anticipated in 5+ Days      Devin Ravi MD  Hospitalist Service  [facility]  Securely message with [employer] (more info)  Text page via [employer] Paging/Directory     - Total time spent on encounter is 55 minutes, which includes counseling, coordination of care, time spent discussing with family, and/or time spent discussing with consultants.    Data reviewed today: I reviewed all new labs and imaging results over the last 24 hours.    Physical Exam   Temp: 97.4  F (36.3  C) Temp src: Axillary BP: 111/64 Pulse: 51   Resp: (!) 9 SpO2: 95 % O2 Device: High Flow Nasal Cannula (HFNC) Oxygen Delivery: 45 LPM  There were no vitals filed for this visit.  Vital Signs with Ranges  Temp:  [93.4  F (34.1  C)-97.4  F (36.3  C)] 97.4  F (36.3  C)  Pulse:  [49-59] 51  Resp:  [8-20] 9  BP: (111-133)/(56-76) 111/64  FiO2 (%):  [50 %] 50 %  SpO2:  [84 %-98 %] 95 %  I/O last 3 completed shifts:  In: 228.75 [I.V.:228.75]  Out: -   O2 requirements: Yes HFNC    Constitutional: Female appears somewhat ill  HEENT: Eyes nonicteric, oral mucosa moist  Cardiovascular: RRR, normal S1/2, no m/r/g  Respiratory: No breath sounds on left, Right relatively clear no wheezing or crackles  Vascular: Anasarca with UE and LLE pitting edema, note R AKA with wound vac  GI: Normoactive bowel sounds, nontender  Skin/Integumen: No rashes  Neuro/Psych: Appropriate affect and mood. A&Ox3, moves all extremities    Medications   Current Facility-Administered Medications   Medication Dose Route Frequency Provider Last Rate Last Admin     Current Facility-Administered Medications   Medication Dose Route Frequency Provider Last Rate Last Admin    amLODIPine (NORVASC) tablet 10 mg  10 mg Oral Daily Bruce Ulloa MD         clopidogrel (PLAVIX) tablet 75 mg  75 mg Oral Daily Bruce Ulloa MD        fluticasone-vilanterol (BREO ELLIPTA) 200-25 MCG/ACT inhaler 1 puff  1 puff Inhalation Daily Bruce Ulloa MD        furosemide (LASIX) injection 100 mg  100 mg Intravenous Q12H Kiana Joseph MD   100 mg at 06/22/24 0822    nicotine (NICODERM CQ) 14 MG/24HR 24 hr patch 1 patch  1 patch Transdermal Daily Bruce Ulloa MD   1 patch at 06/22/24 0823    polyethylene glycol (MIRALAX) Packet 17 g  17 g Oral Daily Bruce Ulloa MD        rosuvastatin (CRESTOR) tablet 10 mg  10 mg Oral At Bedtime Bruce Ulloa MD        saccharomyces boulardii (FLORASTOR) capsule 250 mg  250 mg Oral BID Bruce Ulloa MD   250 mg at 06/22/24 0822    sodium chloride (PF) 0.9% PF flush 3 mL  3 mL Intracatheter Q8H Bruce Ulloa MD        sodium chloride (PF) 0.9% PF flush 3 mL  3 mL Intracatheter Q8H Bruce Ulloa MD   3 mL at 06/22/24 0824    sodium zirconium cyclosilicate (LOKELMA) packet 10 g  10 g Oral TID Kiana Joseph MD        Followed by    [START ON 6/24/2024] sodium zirconium cyclosilicate (LOKELMA) packet 10 g  10 g Oral Daily Kiana Joseph MD           Data   Recent Labs   Lab 06/22/24  0541 06/22/24  0220 06/21/24  2357 06/21/24  2212 06/21/24  2100 06/21/24  1958/24  1802 06/21/24  1749 06/21/24  1610 06/21/24  1449 06/21/24  0959   WBC 9.3  --   --   --   --   --   --   --   --  4.7 5.0   HGB 8.9* 9.1*  --   --   --   --   --   --   --  8.4* 8.6*   MCV 96  --   --   --   --   --   --   --   --  97 96     --   --   --   --   --   --   --   --  248 265   *  --   --   --   --   --   --  128*  --  129* 129*   POTASSIUM 7.1* 7.1*  --   --   --  5.9*  --  6.4*   < > 6.8* 7.0*   CHLORIDE 99  --   --   --   --   --   --  99  --  99 101   CO2 16*  --   --   --   --   --   --  17*  --  18* 17*   BUN 90.1*  --   --   --   --   --   --  90.3*  --  87.4* 90.2*   CR 3.16*  --   --   --   --   --   --  3.07*  --  3.01*  3.06*   ANIONGAP 13  --   --   --   --   --   --  12  --  12 11   SONIA 7.8*  --   --   --   --   --   --  8.5*  --  7.7* 8.1*   GLC 84  --  99 118*   < > 122*   < > 114*   < > 116* 97   ALBUMIN  --   --   --   --   --   --   --   --   --  2.4*  --    PROTTOTAL  --   --   --   --   --   --   --  5.6*  --  5.2*  --    BILITOTAL  --   --   --   --   --   --   --   --   --  <0.2  --    ALKPHOS  --   --   --   --   --   --   --   --   --  63  --    ALT  --   --   --   --   --   --   --   --   --  28  --    AST  --   --   --   --   --   --   --   --   --  27  --     < > = values in this interval not displayed.       Imaging:   Recent Results (from the past 24 hour(s))   XR Chest 2 Views    Narrative    XR CHEST 2 VIEWS 6/21/2024 10:13 AM    HISTORY: SOB. Left dullness. ? effusion; SOB (shortness of breath)    COMPARISON: 4/3/2024 and 4/2/2024      Impression    IMPRESSION: Complete opacification of the left hemithorax which may be  due to a large left pleural effusion or complete atelectasis of the  left lung. Please correlate with ultrasound. Small right pleural  effusion.    BRANDON ALBERTS MD         SYSTEM ID:  [id number]   Chest CT w/o contrast    Narrative    EXAM: CT CHEST W/O CONTRAST  LOCATION: [facility]  DATE: 6/21/2024    INDICATION: Left lung white out on recent chest x-ray, and SOB  COMPARISON: Chest x-ray 6/21/2024 and CT abdomen and pelvis 05/02/2024  TECHNIQUE: CT chest without IV contrast. Multiplanar reformats were obtained. Dose reduction techniques were used.  CONTRAST: None.    FINDINGS:   LUNGS AND PLEURA: A large left pleural effusion has developed since the prior studies, with complete compressive atelectasis/collapse involving the left upper and lower lobes. Moderate size right pleural effusion and subsegmental atelectasis involving a   moderate portion of the right lower lobe. Minimal scarring or thickening right upper lobe.    MEDIASTINUM/AXILLAE: The distal left mainstem  bronchus is occluded No lymphadenopathy. No thoracic aortic aneurysm. Atherosclerotic disease thoracic aorta. Plaque at the origins of the great vessels arising from the arch.    CORONARY ARTERY CALCIFICATION: Severe.    UPPER ABDOMEN: Severe atherosclerotic disease abdominal aorta and branch vessels, incompletely evaluated. Cholecystectomy.    MUSCULOSKELETAL: Edematous changes subcutaneous tissues. Hypertrophic changes thoracic spine.      Impression    IMPRESSION:   1.  Large left pleural effusion with complete collapse of the left upper and left lower lobes. This would be amenable for ultrasound-guided thoracentesis. There is also occlusion of the left mainstem bronchus. Pulmonary consultation recommended.  2.  Moderate-sized right pleural effusion and subsegmental atelectasis right lower lobe.  3.  Diffuse edema subcutaneous tissues.  4.  Severe atherosclerotic disease.     US Thoracentesis    [facility] RADIOLOGY  DATE: 6/21/2024    PROCEDURE: IMAGING GUIDED LEFT THORACENTESIS    INTERVENTIONAL RADIOLOGIST: Jimbo Salinas M.D.    INDICATION: Left pleural effusion.    CONSENT: The risks, benefits and alternatives of an imaging guided  thoracentesis were discussed with the patient  in detail. All  questions were answered. Informed consent was given to proceed with  the procedure.    MODERATE SEDATION: None.    COMPLICATIONS: No immediate complications.    PROCEDURE:    A limited ultrasound was performed for localization purposes.    Using sterile technique 10 mL of lidocaine was infused into the local  soft tissues. Under direct ultrasound guidance a 5 Estonian catheter was  inserted into the pleural effusion.    A total of 800 mL of clear yellow pleural fluid was removed and sent  to the lab if diagnostic analysis was requested.    FINDINGS:  The initial ultrasound shows a pleural effusion.    Images obtained during catheter placement show the access needle with  tip in the pleural fluid.       Impression    IMPRESSION:    Successful ultrasound-guided thoracentesis, as discussed above.      EDELMIRA HURT MD         SYSTEM ID:  [id number]   XR Chest Port 1 View    Narrative    EXAM: CHEST SINGLE VIEW PORTABLE  LOCATION: [facility]  DATE/TIME: 6/22/2024 3:22 AM CDT    INDICATION: Dyspnea.  COMPARISON: 6/21/2004 at 0928 hours.      Impression    IMPRESSION:   1. Near-complete opacification of the left hemithorax. There has been mildly improved aeration of the mid left lung since the comparison study.  2. No other significant change.  3. The right lung is clear.

## 2024-06-22 NOTE — CONSULTS
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   GFRESTIMATED 16*  --   --   --   --   --   --  16*  --  17* 16*   SONIA 7.8*  --   --   --   --   --   --  8.5*  --  7.7* 8.1*   PROTTOTAL  --   --   --   --   --   --   --  5.6*  --  5.2*  --    ALBUMIN  --   --   --   --   --   --   --   --   --  2.4*  --    BILITOTAL  --   --   --   --   --   --   --   --   --  <0.2  --    ALKPHOS  --   --   --   --   --   --   --   --   --  63  --    AST  --   --   --   --   --   --   --   --   --  27  --    ALT  --   --   --   --   --   --   --   --   --  28  --     < > = values in this interval not displayed.     CBC  Recent Labs   Lab 06/22/24  0541 06/22/24  0220 06/21/24  1449 06/21/24  0959   WBC 9.3  --  4.7 5.0   RBC 2.83*  --  2.72* 2.84*   HGB 8.9* 9.1* 8.4* 8.6*   HCT 27.2*  --  26.5* 27.3*  "  MCV 96  --  97 96   MCH 31.4  --  30.9 30.3   MCHC 32.7  --  31.7 31.5   RDW 14.1  --  14.5 14.6     --  248 265     INRNo lab results found in last 7 days.  Lab Results   Component Value Date    TROPI <0.015 06/10/2020    TROPI <0.015 02/16/2020    TROPI <0.015 09/18/2019    TROPONIN <0.07 09/04/2005     Recent Labs   Lab Test 10/03/23  0941 02/15/22  0632   CHOL 137 124   HDL 54 48*   LDL 69 61   TRIG 68 76     Lab Results   Component Value Date    A1C 4.6 06/21/2024    A1C 5.2 01/08/2024    A1C 5.3 02/15/2022    A1C 5.4 09/23/2020    A1C 5.5 06/08/2020    A1C 5.4 05/11/2016    A1C 5.6 03/03/2016    A1C 5.4 07/30/2002     TSH   Date Value Ref Range Status   04/10/2024 2.53 0.30 - 4.20 uIU/mL Final   01/05/2021 0.77 0.40 - 4.00 mU/L Final     Clinically Significant Risk Factors Present on Admission        # Hyperkalemia: Highest K = 7.5 mmol/L in last 2 days, will monitor as appropriate  # Hyponatremia: Lowest Na = 128 mmol/L in last 2 days, will monitor as appropriate      # Hypoalbuminemia: Lowest albumin = 2.4 g/dL at 6/21/2024  2:49 PM, will monitor as appropriate   # Drug Induced Platelet Defect: home medication list includes an antiplatelet medication   # Hypertension: Noted on problem list   # Acute Respiratory Failure: Documented O2 saturation < 91%.  Continue supplemental oxygen as needed   # Anemia: based on hgb <11           # Overweight: Estimated body mass index is 25.12 kg/m  as calculated from the following:    Height as of an earlier encounter on 6/21/24: 1.549 m (5' 1\").    Weight as of 6/5/24: 60.3 kg (132 lb 15 oz).       # Financial/Environmental Concerns:        Fluid overload, unspecified    Acute kidney failure, unspecified  CKD POA List: Stage 4 (GFR 15-29)      COPD      Chronic Fatigue and Other Debilities: Limitation of activities due to disability      ,   "

## 2024-06-22 NOTE — CONSULTS
[facility]    Nephrology Consultation     Date of Admission:  6/21/2024    Assessment & Plan     Shirley Hendricks is a 63 year old female who was admitted on 6/21/2024.     Assessment:      LIMA on CKD 3b   Had recent LIMA requiring dialysis, but seemed to have recovered some function, eGFR from last discharge 33, Cr 1.7. Had hospital follow up visit with Cr 2.4, unclear if any recommendations were made to her at that time. Now Cr 3.16. No uremic symptoms, but has severe hyperkalemia. If unable to produce urine with high dose diuretics, then may need to restart dialysis.   - attempting diuresis, will start bumex gtt with albumin boluses   - strict I/Os   - daily weights   - avoid nephrotoxins   - renally dose meds   - daily BMP   - may need dialysis if remains oliguric    Severe hyperkalemia   She did not follow low K diet on discharge. K 7.5 at highest. EKG with slight bradyardia and PVCs, but no peaked T waves. Patient shifted multiple times, given one dose lokelma 10, one dose lokelma 5g  and given one dose IV lasix without improvement. She reports UOP, but none documented yet, PVR earlier only 30 ml.   ADDENDUM*  - q6h K checks   - shift if K>6.5  - continue lokelma 10 g TID and diuretics as below  - low K diet   - if no UOP, will plan for HD tomorrow, NPO at midnight     Acute on chronic hypoxic respiratory failure   Anasarca  Hypoalbuminemia   Large L pleural effusion with collapse of NOMAN, LLL   Moderate R pleural effusion  Occlusion of L mainstem bronchus    Above findings noted on CT. Pro BNP 11,959. TTE with EF 60-65%, no significant valvular abnormalities. Underwent L thoracentesis (800ml, transudative) 6/21, repeat L thoracentesis (1.2L) 6/22. Remains on HFNC. Attempting diuresis as able.   - bumex IV 4mg x1, IV diuril 500mg x1   - strict I/Os   - plan to start bumex gtt with albumin boluses   - UPC    NAGMA  Due to renal insufficiency. Holding off on bicarb supplementation  given anasarca.     Discussed with Dr. Ravi. Reviewed notes from Dr. Ravi, Dr. Ulloa (hospitalist), Dr. Salomon (IR), Dr. Dillard (cardiology).     Kiana Joseph MD   [facility]phrology  [phone]  --------------------------------------------------------------------------------------------  Reason for Consult     I was asked to see the patient for LIMA, hyperkalemia.    Primary Care Physician     Vasquez Benoit    Chief Complaint     SOB    History is obtained from the patient and chart review.      History of Present Illness     Shirley Hendricks is a 63 year old female who presents with SOB. She has chronic hypoxic respiratory failure. Notes having about a week of worsening SOB. Saw provider Friday, they did imaging noting large pleural effusion and recommending going to ED. She had labs drawn, most notable for K 7, LIMA Cr 3, CXR with large L and moderate R pleural effusions, occlusion of L mainstem. Denies nausea or vomiting. Continues to have SOB. Denies urinary complaints, dysuria, hematuria. Felt like she was urinating ok. Reports significant edema that has progressed since last discharge (6/7). Underwent L thora 6/21 and again today. Remains on HFNC. Hasn't yet made urine. Very worried about needing to do dialysis again.     K shifted overnight, also given one dose lasix, and lokelma.     Past Medical History   I have reviewed this patient's medical history and updated it with pertinent information if needed.   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 clot    Post-menopausal     Reflux esophagitis 02/2004    EGD: esophagitis and medium HH    SBO (small bowel obstruction) (H) 08/10/2021    SVT (supraventricular tachycardia) (H24)     Thrombocytopenia (H24)     Uncomplicated asthma     Vitamin C deficiency 08/12/2018       Past Surgical History   I have reviewed this patient's surgical history and updated it with pertinent information if needed.  Past Surgical History:   Procedure Laterality Date    AMPUTATE LEG ABOVE KNEE N/A 4/1/2024    Procedure: AMPUTATION, ABOVE KNEE right leg with wound vac dressing, excision of anteriotibial bypass graft, closure of (previous interventional radiology) left groin incision;  Surgeon: José Luis Hernandez MD;  Location:  OR    AMPUTATE LEG ABOVE KNEE Right 4/9/2024    Procedure: COMPLETION RIGHT ABOVE KNEE AMPUTATION;  Surgeon: José Luis Hernandez MD;  Location:  OR    ANGIOGRAM      ANGIOGRAM Right 6/23/2021    Procedure: RIGHT LOWER EXTREMITY ANGIOGRAM WITH LEFT BRACHIAL ARTERY CUTDOWN;  Surgeon: José Luis Hernandez MD;  Location:  OR    APPLY WOUND VAC Right 5/16/2024    Procedure: PLACEMENT OF WOUND VAC TO RIGHT ABOVE KNEE AMPUTATION;  Surgeon: Tay Enciso MD;  Location:  OR    APPLY WOUND VAC N/A 5/20/2024    Procedure: AND PLACEMENT OF WOUND VAC;  Surgeon: José Luis Hernandez MD;  Location:  OR    BIOPSY MASS NECK Right 10/11/2023    Procedure: Right Parotid Biopsy;  Surgeon: Randal Mendoza MD;  Location:  OR    BYPASS GRAFT FEMOROPOPLITEAL Right 09/23/2020    Procedure: RIGHT FEMORAL GRAFT TO ABOVE-KNEE POPLITEAL BYPASS USING CADAVERIC SUPERFICIAL  FEMORAL ARTERY;  Surgeon: Chadwick Lozano MD;  Location:  OR    BYPASS GRAFT FEMOROPOPLITEAL Right 2/15/2022    Procedure: RIGHT SUPERFICIAL FEMORAL ARTERY GRAFT TO BELOW KNEE POPLITEAL BYPASS WITH CADAVERIC CRYOLIFE  FEMORAL-POPLITEAL ARTERY SIZE: OUTER DIAMETER: 7MM - 6MM;  Surgeon: Chadwick Lozano;  Location: [facility] OR    BYPASS GRAFT FEMOROPOPLITEAL Right 5/26/2023    Procedure: RIGHT DISTAL SUPERFICIAL FEMORAL GRAFT TO ANTERIOR TIBIAL ARTERY WITH 26 CENTIMETER CADAVERIC SUPERFICIAL FEMORAL ARTERY GRAFT;  Surgeon: Chadwick Lozano MD;  Location:  OR    BYPASS GRAFT FEMOROPOPLITEAL Right 10/11/2023    Procedure: RIGHT FEMORAL TO POPLITEAL GRAFT THROMBECTOMY;  Surgeon: Chadwick Lozano MD;  Location:  OR    BYPASS GRAFT INSITU FEMOROPOPLITEAL Right 7/7/2021    Procedure: CREATION RIGHT FEMORAL TO POPLITEAL ARTERIAL BYPASS USING INSITU VEIN GRAFT;  Surgeon: José Luis Hernandez MD;  Location:  OR    CARDIAC CATHERIZATION  09/03/2009    multivessel CAD without target lesions, med mgmt indicated, preserved ef    CARPAL TUNNEL RELEASE RT/LT Right 05/20/2021    COLONOSCOPY N/A 12/12/2023    Procedure: Colonoscopy;  Surgeon: Corey Hoffman MD;  Location:  GI    COLONOSCOPY N/A 12/14/2023    Procedure: Colonoscopy;  Surgeon: Corey Hoffman MD;  Location:  GI    CV CORONARY ANGIOGRAM N/A 10/4/2023    Procedure: Coronary Angiogram;  Surgeon: Rogelio Ricks MD;  Location:  HEART CARDIAC CATH LAB    CV PCI N/A 10/4/2023    Procedure: Percutaneous Coronary Intervention;  Surgeon: Rogelio Ricks MD;  Location:  HEART CARDIAC CATH LAB    EMBOLECTOMY LOWER EXTREMITY Right 10/6/2023    Procedure: Right anterior tibial bypass with graft, Right tibial endarterectomy,thrombectomy, Right doraslis pedis thrombectomy, Anterior Tibial vein patch.;  Surgeon: Chadwick Lozano MD;  Location:  OR    ENDARTERECTOMY CAROTID Right 05/11/2016    Procedure:  ENDARTERECTOMY CAROTID;  Surgeon: Chadwick Lozano MD;  Location:  OR    ENDARTERECTOMY CAROTID Left 06/08/2020    Procedure: LEFT CAROTID ENDARTERECTOMY with distal facal vein patch  and EEG;  Surgeon: Chadwick Lozano MD;  Location:  OR    ESOPHAGOSCOPY, GASTROSCOPY, DUODENOSCOPY (EGD), COMBINED N/A 12/12/2023    Procedure: Esophagoscopy, gastroscopy, duodenoscopy (EGD), combined;  Surgeon: Corey Hoffman MD;  Location:  GI    FINE NEEDLE ASPIRATION WITHOUT IMAGING GUIDANCE Right 9/22/2023    Procedure: Fine needle aspiration without imaging guidance;  Surgeon: Kiersten Aguilera MD;  Location:  GI    FLUORESCENCE INTRAOPERATIVE VASCULAR ANGIOGRAPHY Right 12/28/2022    Procedure: RIGHT LEG ANGIOGRAM with intervention;  Surgeon: Chadwick Lozano MD;  Location:  OR    GYN SURGERY  left tube    left salpingectomy    IR ANGIOGRAM THROUGH CATHETER (ARTERIAL)  10/6/2023    IR ANGIOGRAM THROUGH CATHETER (ARTERIAL)  10/6/2023    IR ANGIOGRAM THROUGH CATHETER (ARTERIAL)  3/31/2024    IR ANGIOGRAM THROUGH CATHETER (ARTERIAL)  3/30/2024    IR CVC TUNNEL PLACEMENT > 5 YRS OF AGE  4/3/2024    IR CVC TUNNEL REMOVAL RIGHT  5/28/2024    IR CVC TUNNEL REVISION RIGHT  4/15/2024    IR LOWER EXTREMITY ANGIOGRAM RIGHT  05/25/2021    IR LOWER EXTREMITY ANGIOGRAM RIGHT  10/5/2023    IR LOWER EXTREMITY ANGIOGRAM RIGHT  3/29/2024    IR OR ANGIOGRAM  6/23/2021    IR OR ANGIOGRAM  12/28/2022    IRRIGATION AND DEBRIDEMENT LOWER EXTREMITY, COMBINED Right 5/16/2024    Procedure: IRRIGATION AND DEBRIDEMENT OF RIGHT ABOVE KNEE AMPUTATION;  Surgeon: Tay Enciso MD;  Location:  OR    IRRIGATION AND DEBRIDEMENT LOWER EXTREMITY, COMBINED Right 5/20/2024    Procedure: IRRIGATION AND DEBRIDMENT RIGHT LOWER EXTREMITY;  Surgeon: José Luis Hernandez MD;  Location:  OR    LAPAROSCOPIC CHOLECYSTECTOMY N/A 09/27/2017    Procedure: LAPAROSCOPIC CHOLECYSTECTOMY;  LAPAROSCOPIC CHOLECYSTECTOMY;  Surgeon:  Jacoby Tapia MD;  Location: [facility]    LAPAROSCOPY DIAGNOSTIC (GENERAL) N/A 8/11/2021    Procedure: Exploratory laparoscopy,  laparoscopic lysis of adhesions, laparotomy;  Surgeon: Corina Ferreira MD;  Location:  OR    OR ANGIOGRAM, LOWER EXTREMITY Right 10/11/2023    Procedure: Or Angiogram, Lower Extremity;  Surgeon: Chadwick Lozano MD;  Location:  OR    ORTHOPEDIC SURGERY      left knee surgery    REPAIR ANEURYSM FEMORAL ARTERY Left 12/28/2022    Procedure: REPAIR LEFT FEMORAL PSEUDOANEURYSM;  Surgeon: Chadwick Lozano MD;  Location:  OR    VASCULAR SURGERY  aoto bi fem  left fem-AK pop    RUST FABRIC WRAPPING OF ABDOMINAL ANEURYSM      RUST NONSPECIFIC PROCEDURE  12/2000    angioplasty with stent right fem. a.    RUST NONSPECIFIC PROCEDURE  1987    sinus surgery    RUST NONSPECIFIC PROCEDURE  09/02/2009    Emergent left groin exploration with oversewing of bleeding angiographic site. 2. Endarterectomy of common femoral-proximal superficial femoral artery with greater saphenous vein patch angioplasty.   a. [place] of accessory right greater saphenous vein.     RUST NONSPECIFIC PROCEDURE  08/27/2009    occluded left common iliac and external iliac arteries were successfully revascularized with stenting to 8 and 7 mm        Prior to Admission Medications   Prior to Admission Medications   Prescriptions Last Dose Informant Patient Reported? Taking?   acetaminophen (TYLENOL) 500 MG tablet   No No   Sig: Take 1-2 tablets (500-1,000 mg) by mouth every 6 hours as needed for mild pain   amLODIPine (NORVASC) 10 MG tablet Past Week at ran out of at least 3 days ago  No Yes   Sig: Take 1 tablet (10 mg) by mouth daily   budesonide-formoterol (SYMBICORT) 160-4.5 MCG/ACT Inhaler New Rx at not picked up yet  No No   Sig: Inhale 2 puffs into the lungs two times daily Disp 3 inhalers   bumetanide (BUMEX) 2 MG tablet Rx not filled  No No   Sig: Take 1 tablet (2 mg) by mouth daily   carvedilol (COREG) 12.5  MG tablet 6/21/2024 at am  No Yes   Sig: Take 1 tablet (12.5 mg) by mouth 2 times daily (with meals)   cetirizine (ZYRTEC) 5 MG tablet Past Week  No Yes   Sig: Take 1 tablet (5 mg) by mouth daily   clopidogrel (PLAVIX) 75 MG tablet 6/21/2024 at am  No Yes   Sig: Take 1 tablet (75 mg) by mouth daily   diphenoxylate-atropine (LOMOTIL) 2.5-0.025 MG tablet   No Yes   Sig: Take 1 tablet by mouth 4 times daily as needed for diarrhea   famotidine (PEPCID) 20 MG tablet Rx not filled  No No   Sig: Take 1 tablet (20 mg) by mouth 2 times daily   gabapentin (NEURONTIN) 100 MG capsule 6/20/2024 at pm  No Yes   Sig: Take 2 capsules (200 mg) by mouth at bedtime   hydrALAZINE (APRESOLINE) 10 MG tablet Don't think Rx filled  No No   Sig: Take 1 tablet (10 mg) by mouth 4 times daily   miconazole (MICATIN) 2 % external powder   No No   Sig: Apply topically 2 times daily   nicotine (NICODERM CQ) 14 MG/24HR 24 hr patch 6/20/2024  No Yes   Sig: Place 1 patch onto the skin daily   nicotine (NICODERM CQ) 14 MG/24HR 24 hr patch   No No   Sig: Place 1 patch onto the skin every 24 hours   nitroGLYcerin (NITROSTAT) 0.4 MG sublingual tablet  Self No No   Sig: Place 1 tablet (0.4 mg) under the tongue See Admin Instructions for chest pain   ondansetron (ZOFRAN ODT) 4 MG ODT tab   No No   Sig: Take 1 tablet (4 mg) by mouth every 6 hours as needed for nausea or vomiting   oxyCODONE (ROXICODONE) 5 MG tablet 6/21/2024  No Yes   Sig: Take 1 tablet (5 mg) by mouth 2 times daily as needed for moderate pain   polyethylene glycol (MIRALAX) 17 GM/Dose powder   No No   Sig: Take 17 g by mouth daily   rosuvastatin (CRESTOR) 10 MG tablet 6/20/2024  No Yes   Sig: Take 1 tablet (10 mg) by mouth at bedtime   saccharomyces boulardii (FLORASTOR) 250 MG capsule Unsure  No No   Sig: Take 1 capsule (250 mg) by mouth 2 times daily   silver sulfADIAZINE (SILVADENE) 1 % external cream   No No   Sig: Apply topically daily   wound support modular (EXPEDITE) LIQD bottle    No No   Sig: Take 60 mLs by mouth daily      Facility-Administered Medications: None     Allergies   Allergies   Allergen Reactions    Contrast Dye Anaphylaxis     Pt reported facial and throat swelling with prior CT contrast    Pantoprazole      Protonix caused diffuse edema    Chantix [Varenicline]      Terrible dreams    Penicillins Itching and Rash       Social History   I have reviewed this patient's social history and updated it with pertinent information if needed. Shirley Hendricks  reports that she has quit smoking. Her smoking use included cigarettes. She started smoking about 56 years ago. She has a 14.1 pack-year smoking history. She has never used smokeless tobacco. She reports that she does not currently use alcohol. She reports current drug use. Drug: Marijuana.    Family History   I have reviewed this patient's family history and updated it with pertinent information if needed.   Family History   Problem Relation Age of Onset    Cancer Mother         bladder    Cardiovascular Father         alive,multiple heart attacks    Diabetes Maternal Grandmother     Lung Cancer Maternal Grandmother     Blood Disease Brother         clotting disorder       Review of Systems   The 10 point Review of Systems is negative other than noted in the HPI.     Physical Exam   Temp: 97.9  F (36.6  C) Temp src: Axillary BP: 118/65 Pulse: 56   Resp: (!) 8 SpO2: 97 % O2 Device: High Flow Nasal Cannula (HFNC) Oxygen Delivery: 45 LPM  Vital Signs with Ranges  Temp:  [93.4  F (34.1  C)-97.9  F (36.6  C)] 97.9  F (36.6  C)  Pulse:  [49-59] 56  Resp:  [8-20] 8  BP: (111-133)/(56-76) 118/65  FiO2 (%):  [50 %] 50 %  SpO2:  [84 %-98 %] 97 %  0 lbs 0 oz    GENERAL: NAD  HEENT:  Normocephalic. MMM  CV: RRR, no murmurs  RESP: coarse/diminished  MUSCULOSKELETAL:  3+ LE edema, R AKA  SKIN: no suspicious lesions or rashes, dry to touch  NEURO: Alert, oriented, normal speech  PSYCH: mood good, affect appropriate      Data   BMP  Recent  "Labs   Lab 06/22/24  0541 06/22/24  0220 06/21/24  2357 06/21/24  2212 06/21/24  2100 06/21/24  1958/24  1802 06/21/24  1749 06/21/24  1610 06/21/24  1449 06/21/24  0959   *  --   --   --   --   --   --  128*  --  129* 129*   POTASSIUM 7.1* 7.1*  --   --   --  5.9*  --  6.4*   < > 6.8* 7.0*   CHLORIDE 99  --   --   --   --   --   --  99  --  99 101   SONIA 7.8*  --   --   --   --   --   --  8.5*  --  7.7* 8.1*   CO2 16*  --   --   --   --   --   --  17*  --  18* 17*   BUN 90.1*  --   --   --   --   --   --  90.3*  --  87.4* 90.2*   CR 3.16*  --   --   --   --   --   --  3.07*  --  3.01* 3.06*   GLC 84  --  99 118* 113* 122*   < > 114*   < > 116* 97    < > = values in this interval not displayed.     [email or web address](phos:4)  CBC)  Recent Labs   Lab 06/22/24  0541 06/22/24  0220 06/21/24  1449 06/21/24  0959   WBC 9.3  --  4.7 5.0   HGB 8.9* 9.1* 8.4* 8.6*   HCT 27.2*  --  26.5* 27.3*   MCV 96  --  97 96     --  248 265     Recent Labs   Lab 06/21/24  1449   AST 27   ALT 28   ALKPHOS 63   BILITOTAL <0.2     No lab results found in last 7 days.  25 OH Vit D2   Date Value Ref Range Status   10/14/2010 25 ug/L Final     25 OH Vit D3   Date Value Ref Range Status   10/14/2010 14 ug/L Final     25 OH Vit D total   Date Value Ref Range Status   10/14/2010 39 30 - 75 ug/L Final     Comment:     Season, race, dietary intake, and treatment affect the concentration of   25-hydroxy-Vitamin D. Values may decrease during winter months and increase   during summer months. Values less than 30 ug/L may indicate Vitamin D   deficiency.     Recent Labs   Lab 06/22/24  0541 06/21/24  1449 06/21/24  0959   HGB 8.9*   < > 8.6*   HCT 27.2*   < > 27.3*   MCV 96   < > 96   IRON  --   --  57   IRONSAT  --   --  28   FEB  --   --  202*    < > = values in this interval not displayed.     No results for input(s): \"PTHI\" in the last 168 hours.  Color Urine (no units)   Date Value   05/03/2024 Orange (A)   02/04/2019 Yellow "     Appearance Urine (no units)   Date Value   05/03/2024 Cloudy (A)   02/04/2019 Clear     Glucose Urine (mg/dL)   Date Value   05/03/2024 Negative   02/04/2019 Negative     Bilirubin Urine (no units)   Date Value   05/03/2024 Negative   02/04/2019 Negative     Ketones Urine (mg/dL)   Date Value   05/03/2024 Negative   02/04/2019 Negative     Specific Gravity Urine (no units)   Date Value   05/03/2024 1.018   02/04/2019 1.010     pH Urine   Date Value   05/03/2024 6.0   02/04/2019 6.0 pH     Protein Albumin Urine (mg/dL)   Date Value   05/03/2024 200 (A)   02/04/2019 Negative     Urobilinogen Urine (EU/dL)   Date Value   02/04/2019 0.2     Nitrite Urine (no units)   Date Value   05/03/2024 Negative   02/04/2019 Negative     Leukocyte Esterase Urine (no units)   Date Value   05/03/2024 Large (A)   02/04/2019 Negative       TTE  Interpretation Summary     Left ventricular systolic function is normal.  The visual ejection fraction is 60-65%.  Mid anterior and lateral severe hypokinesis  Distal inferior hypokinesis  The right ventricular systolic function is normal.  Atelectasis vs consolidation in left lung with associated effusion. No  hemodynamically significant valvular abnormalities on 2D or color flow  imaging. Compared to prior study, changes are noted.      CT chest  IMPRESSION:   1.  Large left pleural effusion with complete collapse of the left upper and left lower lobes. This would be amenable for ultrasound-guided thoracentesis. There is also occlusion of the left mainstem bronchus. Pulmonary consultation recommended.  2.  Moderate-sized right pleural effusion and subsegmental atelectasis right lower lobe.  3.  Diffuse edema subcutaneous tissues.  4.  Severe atherosclerotic disease.    Kiana Joseph MD   [facility] - [facility]rology  [phone]

## 2024-06-22 NOTE — PROGRESS NOTES
VASCULAR SURGERY    Visited with patient.  Very well-known to us from multiple vascular issues.    Had developed renal insufficiency following her most recent issues with her right leg with dialysis but has been able to discontinue this and tunneled catheter removed.    Appreciated chest x-ray with marked left pleural effusion of uncertain etiology.  Hyperkalemia noted again somewhat unclear etiology.    Likely would require thoracentesis.  Also discussing with pulmonary medicine about bronchoscopy.      Chadwick Lozano MD

## 2024-06-22 NOTE — PLAN OF CARE
Neuro: A&O x4  Tele/cardiac: SB with peaked T waves  Respiration: HFNC 50% 45L  Activity: wheel chair bound at baseline  Pain: tylenol given for headache  Drips: PIV SL  Drains/tubes: R AKA wound vac  Skin: scattered bruising  GI/: NPO from midnight, did not void, bladder scan 41  Aggression color:  Isolation: none  Plan: cardiologist, pulmonary and nephrology consult and echo pending  Potassium still elevated at 7.1 despite giving kayexalate, MILO baker

## 2024-06-22 NOTE — PROGRESS NOTES
Thoracentesis--       Dr. Salomon in to explain procedure and get consent @ 0818. Pt placed in right side down lateral position.  Using sterile technique Dr. Salomon numbed site for thoracentesis.  Introduced catheter into pleural space using US. Removed 1200ml of dario colored pleuritic fluid.  Catheter removed by Dr. Salomon and site covered with a band aid. Pt returned to supine position on cart and returned to her room.  Report given to Kristin ANN

## 2024-06-22 NOTE — PROCEDURES
[facility]    Procedure: Left thoracentesis    Date/Time: 6/22/2024 2:05 PM    Performed by: Leonor Salomon DO  Authorized by: Leonor Salomon DO      UNIVERSAL PROTOCOL   Site Marked: Yes  Prior Images Obtained and Reviewed:  Yes  Required items: Required blood products, implants, devices and special equipment available    Patient identity confirmed:  Verbally with patient, arm band, provided demographic data and hospital-assigned identification number  Patient was reevaluated immediately before administering moderate or deep sedation or anesthesia  Confirmation Checklist:  Patient's identity using two indicators, relevant allergies, procedure was appropriate and matched the consent or emergent situation and correct equipment/implants were available  Time out: Immediately prior to the procedure a time out was called    Universal Protocol: the Joint Commission Universal Protocol was followed    Preparation: Patient was prepped and draped in usual sterile fashion       ANESTHESIA    Anesthesia:  Local infiltration  Local Anesthetic:  Lidocaine 1% without epinephrine      SEDATION    Patient Sedated: No    See dictated procedure note for full details.  Findings: Left thoracentesis, 1200 mL removed    Specimens: fluid and/or tissue for laboratory analysis    Complications: None    Condition: Stable      PROCEDURE    Patient Tolerance:  Patient tolerated the procedure well with no immediate complications  Length of time physician/provider present for 1:1 monitoring during sedation: 0

## 2024-06-23 ENCOUNTER — APPOINTMENT (OUTPATIENT)
Dept: INTERVENTIONAL RADIOLOGY/VASCULAR | Facility: CLINIC | Age: 64
DRG: 205 | End: 2024-06-23
Attending: RADIOLOGY
Payer: COMMERCIAL

## 2024-06-23 ENCOUNTER — APPOINTMENT (OUTPATIENT)
Dept: GENERAL RADIOLOGY | Facility: CLINIC | Age: 64
DRG: 205 | End: 2024-06-23
Attending: INTERNAL MEDICINE
Payer: COMMERCIAL

## 2024-06-23 LAB
ABO/RH(D): NORMAL
ALBUMIN SERPL BCG-MCNC: 2.6 G/DL (ref 3.5–5.2)
ALP SERPL-CCNC: 43 U/L (ref 40–150)
ALT SERPL W P-5'-P-CCNC: 18 U/L (ref 0–50)
ANION GAP SERPL CALCULATED.3IONS-SCNC: 14 MMOL/L (ref 7–15)
ANTIBODY SCREEN: NEGATIVE
AST SERPL W P-5'-P-CCNC: 15 U/L (ref 0–45)
ATRIAL RATE - MUSE: 49 BPM
ATRIAL RATE - MUSE: 62 BPM
BASOPHILS # BLD AUTO: 0.1 10E3/UL (ref 0–0.2)
BASOPHILS NFR BLD AUTO: 1 %
BILIRUB DIRECT SERPL-MCNC: <0.2 MG/DL (ref 0–0.3)
BILIRUB SERPL-MCNC: <0.2 MG/DL
BLD PROD TYP BPU: NORMAL
BLD PROD TYP BPU: NORMAL
BLOOD COMPONENT TYPE: NORMAL
BLOOD COMPONENT TYPE: NORMAL
BUN SERPL-MCNC: 90.3 MG/DL (ref 8–23)
CALCIUM SERPL-MCNC: 7.3 MG/DL (ref 8.8–10.2)
CHLORIDE SERPL-SCNC: 100 MMOL/L (ref 98–107)
CODING SYSTEM: NORMAL
CODING SYSTEM: NORMAL
CREAT SERPL-MCNC: 3.74 MG/DL (ref 0.51–0.95)
CROSSMATCH: NORMAL
CROSSMATCH: NORMAL
DEPRECATED HCO3 PLAS-SCNC: 19 MMOL/L (ref 22–29)
DIASTOLIC BLOOD PRESSURE - MUSE: NORMAL MMHG
DIASTOLIC BLOOD PRESSURE - MUSE: NORMAL MMHG
EGFRCR SERPLBLD CKD-EPI 2021: 13 ML/MIN/1.73M2
EOSINOPHIL # BLD AUTO: 0.2 10E3/UL (ref 0–0.7)
EOSINOPHIL NFR BLD AUTO: 3 %
ERYTHROCYTE [DISTWIDTH] IN BLOOD BY AUTOMATED COUNT: 14.7 % (ref 10–15)
ERYTHROCYTE [DISTWIDTH] IN BLOOD BY AUTOMATED COUNT: 14.7 % (ref 10–15)
GLUCOSE SERPL-MCNC: 76 MG/DL (ref 70–99)
HAPTOGLOB SERPL-MCNC: 176 MG/DL (ref 30–200)
HBV CORE AB SERPL QL IA: NONREACTIVE
HBV SURFACE AB SERPL IA-ACNC: <3.5 M[IU]/ML
HBV SURFACE AB SERPL IA-ACNC: NONREACTIVE M[IU]/ML
HBV SURFACE AG SERPL QL IA: NONREACTIVE
HCT VFR BLD AUTO: 20.3 % (ref 35–47)
HCT VFR BLD AUTO: 20.9 % (ref 35–47)
HGB BLD-MCNC: 10.3 G/DL (ref 11.7–15.7)
HGB BLD-MCNC: 6.4 G/DL (ref 11.7–15.7)
HGB BLD-MCNC: 6.4 G/DL (ref 11.7–15.7)
HGB BLD-MCNC: 9.7 G/DL (ref 11.7–15.7)
IMM GRANULOCYTES # BLD: 0 10E3/UL
IMM GRANULOCYTES NFR BLD: 1 %
INTERPRETATION ECG - MUSE: NORMAL
INTERPRETATION ECG - MUSE: NORMAL
ISSUE DATE AND TIME: NORMAL
LDH SERPL L TO P-CCNC: 102 U/L (ref 0–250)
LDH SERPL L TO P-CCNC: 97 U/L (ref 0–250)
LYMPHOCYTES # BLD AUTO: 1.9 10E3/UL (ref 0.8–5.3)
LYMPHOCYTES NFR BLD AUTO: 35 %
MCH RBC QN AUTO: 29.9 PG (ref 26.5–33)
MCH RBC QN AUTO: 31.1 PG (ref 26.5–33)
MCHC RBC AUTO-ENTMCNC: 30.6 G/DL (ref 31.5–36.5)
MCHC RBC AUTO-ENTMCNC: 31.5 G/DL (ref 31.5–36.5)
MCV RBC AUTO: 98 FL (ref 78–100)
MCV RBC AUTO: 99 FL (ref 78–100)
MONOCYTES # BLD AUTO: 0.3 10E3/UL (ref 0–1.3)
MONOCYTES NFR BLD AUTO: 6 %
NEUTROPHILS # BLD AUTO: 3 10E3/UL (ref 1.6–8.3)
NEUTROPHILS NFR BLD AUTO: 55 %
NRBC # BLD AUTO: 0 10E3/UL
NRBC BLD AUTO-RTO: 0 /100
P AXIS - MUSE: 0 DEGREES
P AXIS - MUSE: 53 DEGREES
PLATELET # BLD AUTO: 199 10E3/UL (ref 150–450)
PLATELET # BLD AUTO: 210 10E3/UL (ref 150–450)
POTASSIUM SERPL-SCNC: 5.3 MMOL/L (ref 3.4–5.3)
PR INTERVAL - MUSE: 152 MS
PR INTERVAL - MUSE: 166 MS
PROT SERPL-MCNC: 4.5 G/DL (ref 6.4–8.3)
QRS DURATION - MUSE: 76 MS
QRS DURATION - MUSE: 80 MS
QT - MUSE: 384 MS
QT - MUSE: 442 MS
QTC - MUSE: 389 MS
QTC - MUSE: 448 MS
R AXIS - MUSE: -26 DEGREES
R AXIS - MUSE: 8 DEGREES
RBC # BLD AUTO: 2.06 10E6/UL (ref 3.8–5.2)
RBC # BLD AUTO: 2.14 10E6/UL (ref 3.8–5.2)
RETICS # AUTO: 0.02 10E6/UL (ref 0.03–0.1)
RETICS/RBC NFR AUTO: 1 % (ref 0.5–2)
SODIUM SERPL-SCNC: 133 MMOL/L (ref 135–145)
SPECIMEN EXPIRATION DATE: NORMAL
SYSTOLIC BLOOD PRESSURE - MUSE: NORMAL MMHG
SYSTOLIC BLOOD PRESSURE - MUSE: NORMAL MMHG
T AXIS - MUSE: 57 DEGREES
T AXIS - MUSE: 68 DEGREES
UNIT ABO/RH: NORMAL
UNIT ABO/RH: NORMAL
UNIT NUMBER: NORMAL
UNIT NUMBER: NORMAL
UNIT STATUS: NORMAL
UNIT STATUS: NORMAL
UNIT TYPE ISBT: 6200
UNIT TYPE ISBT: 6200
VENTRICULAR RATE- MUSE: 62 BPM
VENTRICULAR RATE- MUSE: 62 BPM
WBC # BLD AUTO: 5.5 10E3/UL (ref 4–11)
WBC # BLD AUTO: 6.1 10E3/UL (ref 4–11)

## 2024-06-23 PROCEDURE — C1769 GUIDE WIRE: HCPCS

## 2024-06-23 PROCEDURE — 0JH63XZ INSERTION OF TUNNELED VASCULAR ACCESS DEVICE INTO CHEST SUBCUTANEOUS TISSUE AND FASCIA, PERCUTANEOUS APPROACH: ICD-10-PCS | Performed by: RADIOLOGY

## 2024-06-23 PROCEDURE — 250N000013 HC RX MED GY IP 250 OP 250 PS 637: Performed by: HOSPITALIST

## 2024-06-23 PROCEDURE — P9047 ALBUMIN (HUMAN), 25%, 50ML: HCPCS | Mod: JZ | Performed by: STUDENT IN AN ORGANIZED HEALTH CARE EDUCATION/TRAINING PROGRAM

## 2024-06-23 PROCEDURE — 5A09357 ASSISTANCE WITH RESPIRATORY VENTILATION, LESS THAN 24 CONSECUTIVE HOURS, CONTINUOUS POSITIVE AIRWAY PRESSURE: ICD-10-PCS | Performed by: STUDENT IN AN ORGANIZED HEALTH CARE EDUCATION/TRAINING PROGRAM

## 2024-06-23 PROCEDURE — 87340 HEPATITIS B SURFACE AG IA: CPT | Performed by: STUDENT IN AN ORGANIZED HEALTH CARE EDUCATION/TRAINING PROGRAM

## 2024-06-23 PROCEDURE — 250N000009 HC RX 250: Performed by: RADIOLOGY

## 2024-06-23 PROCEDURE — 86704 HEP B CORE ANTIBODY TOTAL: CPT | Performed by: STUDENT IN AN ORGANIZED HEALTH CARE EDUCATION/TRAINING PROGRAM

## 2024-06-23 PROCEDURE — 36415 COLL VENOUS BLD VENIPUNCTURE: CPT | Performed by: INTERNAL MEDICINE

## 2024-06-23 PROCEDURE — 99232 SBSQ HOSP IP/OBS MODERATE 35: CPT | Performed by: STUDENT IN AN ORGANIZED HEALTH CARE EDUCATION/TRAINING PROGRAM

## 2024-06-23 PROCEDURE — 86481 TB AG RESPONSE T-CELL SUSP: CPT | Performed by: STUDENT IN AN ORGANIZED HEALTH CARE EDUCATION/TRAINING PROGRAM

## 2024-06-23 PROCEDURE — 272N000196 HC ACCESSORY CR5

## 2024-06-23 PROCEDURE — 80048 BASIC METABOLIC PNL TOTAL CA: CPT | Performed by: INTERNAL MEDICINE

## 2024-06-23 PROCEDURE — 85027 COMPLETE CBC AUTOMATED: CPT | Performed by: INTERNAL MEDICINE

## 2024-06-23 PROCEDURE — 02HV33Z INSERTION OF INFUSION DEVICE INTO SUPERIOR VENA CAVA, PERCUTANEOUS APPROACH: ICD-10-PCS | Performed by: RADIOLOGY

## 2024-06-23 PROCEDURE — C1729 CATH, DRAINAGE: HCPCS

## 2024-06-23 PROCEDURE — 272N000569 HC SHEATH CR6

## 2024-06-23 PROCEDURE — 94660 CPAP INITIATION&MGMT: CPT

## 2024-06-23 PROCEDURE — 85018 HEMOGLOBIN: CPT | Performed by: INTERNAL MEDICINE

## 2024-06-23 PROCEDURE — C1750 CATH, HEMODIALYSIS,LONG-TERM: HCPCS

## 2024-06-23 PROCEDURE — 99233 SBSQ HOSP IP/OBS HIGH 50: CPT | Performed by: INTERNAL MEDICINE

## 2024-06-23 PROCEDURE — P9016 RBC LEUKOCYTES REDUCED: HCPCS | Performed by: INTERNAL MEDICINE

## 2024-06-23 PROCEDURE — 999N000040 HC STATISTIC CONSULT NO CHARGE VASC ACCESS

## 2024-06-23 PROCEDURE — 120N000013 HC R&B IMCU

## 2024-06-23 PROCEDURE — 250N000011 HC RX IP 250 OP 636: Performed by: HOSPITALIST

## 2024-06-23 PROCEDURE — 99152 MOD SED SAME PHYS/QHP 5/>YRS: CPT

## 2024-06-23 PROCEDURE — 250N000013 HC RX MED GY IP 250 OP 250 PS 637: Performed by: STUDENT IN AN ORGANIZED HEALTH CARE EDUCATION/TRAINING PROGRAM

## 2024-06-23 PROCEDURE — 85045 AUTOMATED RETICULOCYTE COUNT: CPT | Performed by: INTERNAL MEDICINE

## 2024-06-23 PROCEDURE — 0W9B30Z DRAINAGE OF LEFT PLEURAL CAVITY WITH DRAINAGE DEVICE, PERCUTANEOUS APPROACH: ICD-10-PCS | Performed by: RADIOLOGY

## 2024-06-23 PROCEDURE — 82248 BILIRUBIN DIRECT: CPT | Performed by: INTERNAL MEDICINE

## 2024-06-23 PROCEDURE — 250N000013 HC RX MED GY IP 250 OP 250 PS 637: Performed by: INTERNAL MEDICINE

## 2024-06-23 PROCEDURE — 5A1D70Z PERFORMANCE OF URINARY FILTRATION, INTERMITTENT, LESS THAN 6 HOURS PER DAY: ICD-10-PCS | Performed by: STUDENT IN AN ORGANIZED HEALTH CARE EDUCATION/TRAINING PROGRAM

## 2024-06-23 PROCEDURE — 71045 X-RAY EXAM CHEST 1 VIEW: CPT

## 2024-06-23 PROCEDURE — 999N000157 HC STATISTIC RCP TIME EA 10 MIN

## 2024-06-23 PROCEDURE — 86900 BLOOD TYPING SEROLOGIC ABO: CPT | Performed by: INTERNAL MEDICINE

## 2024-06-23 PROCEDURE — 250N000011 HC RX IP 250 OP 636: Mod: JZ | Performed by: RADIOLOGY

## 2024-06-23 PROCEDURE — 32557 INSERT CATH PLEURA W/ IMAGE: CPT

## 2024-06-23 PROCEDURE — 85025 COMPLETE CBC W/AUTO DIFF WBC: CPT | Performed by: INTERNAL MEDICINE

## 2024-06-23 PROCEDURE — 99232 SBSQ HOSP IP/OBS MODERATE 35: CPT | Performed by: INTERNAL MEDICINE

## 2024-06-23 PROCEDURE — 90935 HEMODIALYSIS ONE EVALUATION: CPT

## 2024-06-23 PROCEDURE — 634N000001 HC RX 634: Mod: JZ | Performed by: STUDENT IN AN ORGANIZED HEALTH CARE EDUCATION/TRAINING PROGRAM

## 2024-06-23 PROCEDURE — 272N000500 HC NEEDLE CR2

## 2024-06-23 PROCEDURE — 250N000011 HC RX IP 250 OP 636: Mod: JZ | Performed by: STUDENT IN AN ORGANIZED HEALTH CARE EDUCATION/TRAINING PROGRAM

## 2024-06-23 PROCEDURE — 258N000003 HC RX IP 258 OP 636: Mod: JZ | Performed by: STUDENT IN AN ORGANIZED HEALTH CARE EDUCATION/TRAINING PROGRAM

## 2024-06-23 PROCEDURE — 86706 HEP B SURFACE ANTIBODY: CPT | Performed by: STUDENT IN AN ORGANIZED HEALTH CARE EDUCATION/TRAINING PROGRAM

## 2024-06-23 PROCEDURE — 83615 LACTATE (LD) (LDH) ENZYME: CPT | Performed by: INTERNAL MEDICINE

## 2024-06-23 PROCEDURE — 83010 ASSAY OF HAPTOGLOBIN QUANT: CPT | Performed by: INTERNAL MEDICINE

## 2024-06-23 PROCEDURE — 86923 COMPATIBILITY TEST ELECTRIC: CPT | Performed by: INTERNAL MEDICINE

## 2024-06-23 RX ORDER — NALOXONE HYDROCHLORIDE 0.4 MG/ML
0.2 INJECTION, SOLUTION INTRAMUSCULAR; INTRAVENOUS; SUBCUTANEOUS
Status: DISCONTINUED | OUTPATIENT
Start: 2024-06-23 | End: 2024-06-23

## 2024-06-23 RX ORDER — HYDROMORPHONE HYDROCHLORIDE 2 MG/1
2 TABLET ORAL
Status: DISCONTINUED | OUTPATIENT
Start: 2024-06-23 | End: 2024-07-08 | Stop reason: HOSPADM

## 2024-06-23 RX ORDER — HYDROMORPHONE HYDROCHLORIDE 2 MG/1
2 TABLET ORAL
Status: DISCONTINUED | OUTPATIENT
Start: 2024-06-23 | End: 2024-06-30

## 2024-06-23 RX ORDER — GUAIFENESIN 600 MG/1
600 TABLET, EXTENDED RELEASE ORAL 2 TIMES DAILY PRN
Status: DISCONTINUED | OUTPATIENT
Start: 2024-06-23 | End: 2024-07-08 | Stop reason: HOSPADM

## 2024-06-23 RX ORDER — HEPARIN SODIUM 1000 [USP'U]/ML
4000 INJECTION, SOLUTION INTRAVENOUS; SUBCUTANEOUS ONCE
Status: COMPLETED | OUTPATIENT
Start: 2024-06-23 | End: 2024-06-23

## 2024-06-23 RX ORDER — NALOXONE HYDROCHLORIDE 0.4 MG/ML
0.4 INJECTION, SOLUTION INTRAMUSCULAR; INTRAVENOUS; SUBCUTANEOUS
Status: DISCONTINUED | OUTPATIENT
Start: 2024-06-23 | End: 2024-06-23

## 2024-06-23 RX ORDER — FAMOTIDINE 10 MG
10 TABLET ORAL DAILY
Status: DISCONTINUED | OUTPATIENT
Start: 2024-06-23 | End: 2024-07-08 | Stop reason: HOSPADM

## 2024-06-23 RX ORDER — ALBUMIN (HUMAN) 12.5 G/50ML
50 SOLUTION INTRAVENOUS
Status: DISCONTINUED | OUTPATIENT
Start: 2024-06-23 | End: 2024-06-23

## 2024-06-23 RX ORDER — CARBOXYMETHYLCELLULOSE SODIUM 5 MG/ML
1 SOLUTION/ DROPS OPHTHALMIC
Status: DISCONTINUED | OUTPATIENT
Start: 2024-06-23 | End: 2024-07-08 | Stop reason: HOSPADM

## 2024-06-23 RX ORDER — FLUMAZENIL 0.1 MG/ML
0.2 INJECTION, SOLUTION INTRAVENOUS
Status: DISCONTINUED | OUTPATIENT
Start: 2024-06-23 | End: 2024-06-23

## 2024-06-23 RX ORDER — FENTANYL CITRATE 50 UG/ML
25-50 INJECTION, SOLUTION INTRAMUSCULAR; INTRAVENOUS EVERY 5 MIN PRN
Status: DISCONTINUED | OUTPATIENT
Start: 2024-06-23 | End: 2024-06-23

## 2024-06-23 RX ORDER — HYDROMORPHONE HYDROCHLORIDE 1 MG/ML
0.3 INJECTION, SOLUTION INTRAMUSCULAR; INTRAVENOUS; SUBCUTANEOUS ONCE
Status: DISCONTINUED | OUTPATIENT
Start: 2024-06-23 | End: 2024-07-01

## 2024-06-23 RX ADMIN — SODIUM ZIRCONIUM CYCLOSILICATE 10 G: 5 POWDER, FOR SUSPENSION ORAL at 00:29

## 2024-06-23 RX ADMIN — LIDOCAINE HYDROCHLORIDE 8 ML: 10; .005 INJECTION, SOLUTION EPIDURAL; INFILTRATION; INTRACAUDAL; PERINEURAL at 12:20

## 2024-06-23 RX ADMIN — Medication 250 MG: at 20:11

## 2024-06-23 RX ADMIN — HYDROMORPHONE HYDROCHLORIDE 2 MG: 2 TABLET ORAL at 08:31

## 2024-06-23 RX ADMIN — ALBUMIN HUMAN 25 G: 0.25 SOLUTION INTRAVENOUS at 00:28

## 2024-06-23 RX ADMIN — LIDOCAINE HYDROCHLORIDE 8 ML: 10 INJECTION, SOLUTION INFILTRATION; PERINEURAL at 12:46

## 2024-06-23 RX ADMIN — CLOPIDOGREL BISULFATE 75 MG: 75 TABLET ORAL at 08:30

## 2024-06-23 RX ADMIN — FENTANYL CITRATE 25 MCG: 50 INJECTION INTRAMUSCULAR; INTRAVENOUS at 12:20

## 2024-06-23 RX ADMIN — GUAIFENESIN 600 MG: 600 TABLET, EXTENDED RELEASE ORAL at 08:30

## 2024-06-23 RX ADMIN — HEPARIN SODIUM 4000 UNITS: 1000 INJECTION INTRAVENOUS; SUBCUTANEOUS at 12:22

## 2024-06-23 RX ADMIN — FAMOTIDINE 10 MG: 10 TABLET, FILM COATED ORAL at 08:30

## 2024-06-23 RX ADMIN — HEPARIN SODIUM 1600 UNITS: 1000 INJECTION INTRAVENOUS; SUBCUTANEOUS at 15:40

## 2024-06-23 RX ADMIN — MIDAZOLAM 0.5 MG: 1 INJECTION INTRAMUSCULAR; INTRAVENOUS at 12:03

## 2024-06-23 RX ADMIN — Medication 250 MG: at 08:30

## 2024-06-23 RX ADMIN — Medication: at 15:35

## 2024-06-23 RX ADMIN — FLUTICASONE FUROATE AND VILANTEROL TRIFENATATE 1 PUFF: 200; 25 POWDER RESPIRATORY (INHALATION) at 08:34

## 2024-06-23 RX ADMIN — HYDROMORPHONE HYDROCHLORIDE 2 MG: 2 TABLET ORAL at 15:27

## 2024-06-23 RX ADMIN — HYDROMORPHONE HYDROCHLORIDE 2 MG: 2 TABLET ORAL at 20:28

## 2024-06-23 RX ADMIN — EPOETIN ALFA-EPBX 10000 UNITS: 10000 INJECTION, SOLUTION INTRAVENOUS; SUBCUTANEOUS at 15:37

## 2024-06-23 RX ADMIN — ONDANSETRON 4 MG: 4 TABLET, ORALLY DISINTEGRATING ORAL at 17:26

## 2024-06-23 RX ADMIN — ALBUMIN HUMAN 25 G: 0.25 SOLUTION INTRAVENOUS at 06:08

## 2024-06-23 RX ADMIN — NICOTINE 1 PATCH: 14 PATCH, EXTENDED RELEASE TRANSDERMAL at 08:34

## 2024-06-23 RX ADMIN — OXYCODONE HYDROCHLORIDE 5 MG: 5 TABLET ORAL at 03:24

## 2024-06-23 RX ADMIN — SODIUM ZIRCONIUM CYCLOSILICATE 10 G: 5 POWDER, FOR SUSPENSION ORAL at 08:34

## 2024-06-23 RX ADMIN — SODIUM CHLORIDE 300 ML: 9 INJECTION, SOLUTION INTRAVENOUS at 13:05

## 2024-06-23 RX ADMIN — SODIUM CHLORIDE 250 ML: 9 INJECTION, SOLUTION INTRAVENOUS at 15:36

## 2024-06-23 ASSESSMENT — ACTIVITIES OF DAILY LIVING (ADL)
ADLS_ACUITY_SCORE: 40
ADLS_ACUITY_SCORE: 46
ADLS_ACUITY_SCORE: 43
ADLS_ACUITY_SCORE: 46
ADLS_ACUITY_SCORE: 46
ADLS_ACUITY_SCORE: 42
ADLS_ACUITY_SCORE: 46
ADLS_ACUITY_SCORE: 40
ADLS_ACUITY_SCORE: 43
ADLS_ACUITY_SCORE: 46
ADLS_ACUITY_SCORE: 42

## 2024-06-23 NOTE — PROCEDURES
[facility]    Procedure: Right IJ tunneled catheter placement and left chest tube placement    Date/Time: 6/23/2024 12:54 PM    Performed by: Leonor Salomon DO  Authorized by: Leonor Salomon DO      UNIVERSAL PROTOCOL   Site Marked: NA  Prior Images Obtained and Reviewed:  Yes  Required items: Required blood products, implants, devices and special equipment available    Patient identity confirmed:  Verbally with patient, arm band, provided demographic data and hospital-assigned identification number  Patient was reevaluated immediately before administering moderate or deep sedation or anesthesia  Confirmation Checklist:  Patient's identity using two indicators, relevant allergies, procedure was appropriate and matched the consent or emergent situation and correct equipment/implants were available  Time out: Immediately prior to the procedure a time out was called    Universal Protocol: the Joint Commission Universal Protocol was followed    Preparation: Patient was prepped and draped in usual sterile fashion       ANESTHESIA    Anesthesia:  Local infiltration  Local Anesthetic:  Lidocaine 1% without epinephrine      SEDATION  Patient Sedated: Yes    Sedation Type:  Moderate (conscious) sedation  Vital signs: Vital signs monitored during sedation    See dictated procedure note for full details.  Findings: 1. Right internal jugular tunneled catheter placement. OK to use (orders in sign and held).    2. Left chest tube placement, 900 mL removed. Post images show no residual fluid. Chest tube to 20 mmHg suction (orders in sign and held).     Specimens: none    Complications: None    Condition: Stable      PROCEDURE    Patient Tolerance:  Patient tolerated the procedure well with no immediate complications  Length of time physician/provider present for 1:1 monitoring during sedation: 45

## 2024-06-23 NOTE — CONSULTS
IR CONSULT:    IR consulted for:  1. Tunneled dialysis catheter  2. Left chest tube placement.     Shirley Hendricks is a 63 year old female with PMH CAD, MI, peripheral vascular disease status angioplasty and right AKA with wound vac, DLE, COPD, CMT disease, tobacco use, CKD stage III, GERD, hypertension, history of B-cell lymphoma, hyperlipidemia, depression, anxiety, who was admitted on 6/21/2024 with acute hypoxic respiratory failure due to a large left pleural effusion, hyperkalemia, and hyponatremia.    This admission, she had a CT that showed a large left pleural effusion as well as occlusion of the left mainstem bronchus, Pulmonary consultation recommended.    Patient underwent left thoracentesis with 800mL and 1200mL fluid removed. Final images after thoracentesis shows no residual pleural fluid.  Patient is still requiring significant O2, despite 2L of fluid being removed between Friday and Saturday. CXR today shows near complete opacification of the left lung.     IR was consulted for a dialysis catheter to help remove excess fluid.  IR was also consulted for a left chest tube to make sure that all of the fluid was removed. We will check for pleural fluid and place a chest tube if there is adequate fluid.     Plan:   IR to place a tunneled dialysis catheter   IR to evaluate with ultrasound to see if there pleural fluid as accumulated since yesterday. If there is pleural fluid, we will place a chest tube.    Based on the CT from 6/21, consider bronchoscopy.     Leonor Salomon, DO  Interventional Radiology - [facility]

## 2024-06-23 NOTE — PRE-PROCEDURE
GENERAL PRE-PROCEDURE:   Procedure:  Tunneled dialysis catheter and left chest tube  Date/Time:  6/23/2024 12:01 PM    Written consent obtained?: Yes    Risks and benefits: Risks, benefits and alternatives were discussed    Consent given by:  Patient  Patient states understanding of procedure being performed: Yes    Patient's understanding of procedure matches consent: Yes    Procedure consent matches procedure scheduled: Yes    Expected level of sedation:  Moderate  Appropriately NPO:  Yes  ASA Class:  2  Mallampati  :  N/A- Alternate secured airway (Difficult to evaluate airway as patient is on bipap)  Lungs:  Lungs clear with good breath sounds bilaterally  Heart:  Normal heart sounds and rate  History & Physical reviewed:  History and physical reviewed and no updates needed  Statement of review:  I have reviewed the lab findings, diagnostic data, medications, and the plan for sedation

## 2024-06-23 NOTE — PROGRESS NOTES
PICC/IV note:     Unable to change PICC line dressing today, pt heading to IR per staff. Deferred dressing change to evening or tomorrow.

## 2024-06-23 NOTE — IR NOTE
Interventional Radiology Intra-procedural Nursing Note    Patient Name: Shirley Hendricks  Medical Record Number: [id number]  Today's Date: June 23, 2024    Procedure consented for : tunneled CVC with moderate sedaton and left chest tube placement  Start time: 1217    Report provided to: [facility] RN  Patient depart time and location: 309    Note: Patient entered Interventional Radiology Suite number 1 via cart. Patient awake, alert and oriented. Assisted onto procedural table in supine position. Prepped and draped.  Dr. Salomon in room. Time out and procedure started. Vital signs stable. Telemetry reading SR.    Procedure well tolerated by patient without complications. Procedure end with debrief by Dr. Salomon.  Pressure held until hemostasis achieved. Biopatch and tegaderm  dressing applied to right chest wall .        Administered medication totals:    Lidocaine with Epinephrine 8 mL Intradermal  Heparin 4000 Units each lumen  Versed 0.5 mg IVP  Fentanyl 25 mcg IVP

## 2024-06-23 NOTE — PROCEDURES
[facility]    Double Lumen PICC Placement    Date/Time: 6/22/2024 9:55 PM    Performed by: Mikey Marcus RN  Authorized by: Kiana Joseph MD  Indications: vascular access      UNIVERSAL PROTOCOL   Site Marked: Yes  Prior Images Obtained and Reviewed:  Yes  Required items: Required blood products, implants, devices and special equipment available    Patient identity confirmed:  Verbally with patient, arm band and hospital-assigned identification number  NA - No sedation, light sedation, or local anesthesia  Confirmation Checklist:  Patient's identity using two indicators, relevant allergies, procedure was appropriate and matched the consent or emergent situation and correct equipment/implants were available  Time out: Immediately prior to the procedure a time out was called    Universal Protocol: the Joint Commission Universal Protocol was followed    Preparation: Patient was prepped and draped in usual sterile fashion    ESBL (mL):  2     ANESTHESIA    Anesthesia:  Local infiltration  Local Anesthetic:  Lidocaine 1% without epinephrine  Anesthetic Total (mL):  2      SEDATION    Patient Sedated: No        Preparation: skin prepped with 2% chlorhexidine  Skin prep agent: skin prep agent completely dried prior to procedure  Sterile barriers: maximum sterile barriers were used: cap, mask, sterile gown, sterile gloves, and large sterile sheet  Hand hygiene: hand hygiene performed prior to central venous catheter insertion  Type of line used: PICC  Catheter type: double lumen  Lumen type: valved  Lumen Identification: Purple and Red  Catheter size: 5 Fr  Brand: Bard  Lot number: [id number]  Placement method: venipuncture, MST, ultrasound and tip navigation system  Number of attempts: 2  Difficulty threading catheter: yes  Successful placement: yes  Orientation: left    Location: brachial vein (medial)  Tip Location: SVC  Arm circumference: adults 10 cm  Extremity circumference: 24  Visible  catheter length: 3  Total catheter length: 36  Dressing and securement: adhesive securement device, blood cleaned with CHG, blood removed, chlorhexidine disc applied, dressing applied, securement device, transparent securement dressing and transparent dressing  Post procedure assessment: blood return through all ports and placement verified by 3CG technology  Complications: none.  PROCEDURE   Patient Tolerance:  Patient tolerated the procedure well with no immediate complicationsDescribe Procedure: 2L PICC placed successfully after a couple of attempts as had vasospasms. Lines flushed with NS and good blood return. Tip in SVC as confirmed by 3CG tech. Line ready to use.  Disposal: sharps and needle count correct at the end of procedure, needles and guidewire disposed in sharps container

## 2024-06-23 NOTE — PROVIDER NOTIFICATION
MD Notification    Notified Person: MD    Notified Person Name: Dr. Fletcher    Notification Date/Time: 6/22/24 at 8:00pm    Notification Interaction: Vocera    Purpose of Notification: Pt is still having heart burn despite having tums. Per the H&P it says to continue to PTA GERD medication which is famotidine. It was not ordered. Can you please order it?    Orders Received:    Comments: No response

## 2024-06-23 NOTE — PLAN OF CARE
A/Ox4. VSS. On 8-10lpm oxymask. Tele SR. LS with coarse crackles. +bs, +flatus, +bm. Incontinent of stool. No UOP overnight. Bladder scanned for 49cc, and 8cc. Wound vac to R AKA patent. Blanchable redness to coccyx with pinpoint open area. Mepilex in place. NPO since midnight. Bumex gtt infusing. Double lumen PICC placed during shift. Managing pain with prn oxycodone. Weight shifting. Generalized pitting edema +2-3.

## 2024-06-23 NOTE — PROGRESS NOTES
Inpatient Cardiology Consultation Progress Note:    Shirley Hendricks MRN#: [id number]   YOB: 1960 Age: 63 year old     Date of Admission: 6/21/2024  Consult indication: NSTEMI         Assessment and Plan:     # Acute on chronic hypoxic respiratory failure, baseline COPD, now with fluid overload with large pleural effusions likely 2/2 renal failure  # Elevated troponin, mild, no chest pain, no acute ischemic changes on ECG, most consistent with demand ischemia   # TTE 6/22/2024 demonstrating preserved LVEF 60% with mid anterior and anterolateral hypokinesis (new compared to prior TTE 11/2023)  # Acute anemia requiring blood transfusion  # CAD, coronary angiogram 10/4/2023 demonstrated left main no significant disease, LAD no significant disease, D1 100% stenosis filled by collaterals from distal LAD, left circumflex distal 55% stenosis, OM1 large vessel with 45% stenosis, proximal RCA 45% stenosis, RPA V 30 to 50% stenosis.  RCA lesion was not hemodynamically significant by IFR (0.99)  # Severe electrolyte abnormalities   # Severe PAD  # HTN  # HL  # Recently quit smoking     - I was only able to examine the patient in a limited capacity due to sterile precautions during HD, if the limited TTE demonstrates concerning findings please contact the [facility] cardiology service (I am on for the weekend only). Patient is not a candidate for cardiac ischemic evaluation at this time, will sign off and arrange outpatient follow up with her primary cardiology team      Thank you for allowing our team to participate in the care of Shirley Hendricks.  Please do not hesitate to page me with any questions or concerns.     Ryley Dillard MD, [facility]  Cardiology  June 23, 2024    Voice recognition software utilized.          Interval Events:     - now s/p thoracentesis, chest tube placement  - started HD today         Medications reviewed:     Current medications:  Current Facility-Administered  Medications   Medication Dose Route Frequency Provider Last Rate Last Admin    acetaminophen (TYLENOL) tablet 650 mg  650 mg Oral Q4H PRN Ann Araya MD   650 mg at 06/22/24 0822    Or    acetaminophen (TYLENOL) Suppository 650 mg  650 mg Rectal Q4H PRN Ann Araya MD        [Held by provider] amLODIPine (NORVASC) tablet 10 mg  10 mg Oral Daily Bruce Ulloa MD        bumetanide (BUMEX) 0.25 mg/mL infusion  0.5 mg/hr Intravenous Continuous Kiana Joseph MD 2 mL/hr at 06/22/24 2159 0.5 mg/hr at 06/22/24 2159    calcium carbonate (TUMS) chewable tablet 1,000 mg  1,000 mg Oral 4x Daily PRN Bruce Ulloa MD   1,000 mg at 06/22/24 1823    [Held by provider] clopidogrel (PLAVIX) tablet 75 mg  75 mg Oral Daily Bruce Ulloa MD   75 mg at 06/23/24 0830    glucose gel 15-30 g  15-30 g Oral Q15 Min PRN Kiana Joseph MD        Or    dextrose 50 % injection 25-50 mL  25-50 mL Intravenous Q15 Min PRN Kiana Joseph MD        Or    glucagon injection 1 mg  1 mg Subcutaneous Q15 Min PRN Kiana Joseph MD        famotidine (PEPCID) tablet 10 mg  10 mg Oral Daily Devin Ravi MD   10 mg at 06/23/24 0830    fluticasone-vilanterol (BREO ELLIPTA) 200-25 MCG/ACT inhaler 1 puff  1 puff Inhalation Daily Bruce Ulloa MD   1 puff at 06/23/24 0834    [Held by provider] furosemide (LASIX) injection 100 mg  100 mg Intravenous Q12H Kiana Joseph MD   100 mg at 06/22/24 0822    guaiFENesin (MUCINEX) 12 hr tablet 600 mg  600 mg Oral BID PRN Ann Araya MD   600 mg at 06/23/24 0830    HYDROmorphone (DILAUDID) tablet 2 mg  2 mg Oral Q3H PRN Devin Ravi MD   2 mg at 06/23/24 0831    lidocaine (LMX4) cream   Topical Q1H PRN Kiana Joseph MD        lidocaine 1 % 0.1-5 mL  0.1-5 mL Other Q1H PRN Kiana Joseph MD   2 mL at 06/22/24 2015    naloxone (NARCAN) injection 0.2 mg  0.2 mg Intravenous Q2 Min PRN Bruce Ulloa MD        Or     naloxone (NARCAN) injection 0.4 mg  0.4 mg Intravenous Q2 Min PRN Bruce Ulloa MD        Or    naloxone (NARCAN) injection 0.2 mg  0.2 mg Intramuscular Q2 Min PRN Bruce Ulloa MD        Or    naloxone (NARCAN) injection 0.4 mg  0.4 mg Intramuscular Q2 Min PRN Bruce Ulloa MD        nicotine (NICODERM CQ) 14 MG/24HR 24 hr patch 1 patch  1 patch Transdermal Daily Bruce Ulloa MD   1 patch at 24 0834    ondansetron (ZOFRAN ODT) ODT tab 4 mg  4 mg Oral Q6H PRN Bruce Ulloa MD        Or    ondansetron (ZOFRAN) injection 4 mg  4 mg Intravenous Q6H PRN Bruce Ulloa MD   4 mg at 24 2153    polyethylene glycol (MIRALAX) Packet 17 g  17 g Oral Daily Bruce Ulloa MD        [Held by provider] rosuvastatin (CRESTOR) tablet 10 mg  10 mg Oral At Bedtime Bruce Ulloa MD   10 mg at 24 215    saccharomyces boulardii (FLORASTOR) capsule 250 mg  250 mg Oral BID Bruce Ulloa MD   250 mg at 24 0830    senna-docusate (SENOKOT-S/PERICOLACE) 8.6-50 MG per tablet 1 tablet  1 tablet Oral BID PRN Bruce Ulloa MD        Or    senna-docusate (SENOKOT-S/PERICOLACE) 8.6-50 MG per tablet 2 tablet  2 tablet Oral BID PRN Bruce Ulloa MD        sodium chloride (PF) 0.9% PF flush 3 mL  3 mL Intracatheter Q8H Bruce Ulloa MD   3 mL at 24 1515    sodium chloride (PF) 0.9% PF flush 3 mL  3 mL Intracatheter q1 min prn Bruce Ulloa MD        sodium zirconium cyclosilicate (LOKELMA) packet 10 g  10 g Oral TID Kiana Joseph MD   10 g at 24 0834    Followed by    [START ON 2024] sodium zirconium cyclosilicate (LOKELMA) packet 10 g  10 g Oral Daily Kiana Joseph MD                 Physical Exam:   Vital signs were reviewed:  Temperatures:  Current - Temp: 98.3  F (36.8  C); Max - Temp  Av.1  F (36.7  C)  Min: 97.7  F (36.5  C)  Max: 98.5  F (36.9  C)  Respiration range: Resp  Avg: 15.8  Min: 8  Max: 24  Pulse range: Pulse  Av.8  Min: 55  Max: 72  Blood pressure range:  Systolic (24hrs), Av , Min:108 , Max:129   ; Diastolic (24hrs), Av, Min:49, Max:66    Pulse oximetry range: SpO2  Av %  Min: 92 %  Max: 100 %    Intake/Output Summary (Last 24 hours) at 2024 09  Last data filed at 2024 0608  Gross per 24 hour   Intake 1080 ml   Output --   Net 1080 ml     0 lbs 0 oz  There is no height or weight on file to calculate BMI.   There is no height or weight on file to calculate BSA.    Exam limited during HD session due to sterile precautions    General/Constitutional: appears stated age, in no apparent distress, appears to be well nourished  Respiratory: no increased work of breathing, suppl O2           Selected laboratory tests:   Laboratory test results personally reviewed:   CMP  Recent Labs   Lab 24  0609 24  2218 24  2146 24  1918 24  1748 24  1713 24  1513 24  0541 24  1802 24  1749 24  1610 24  1449   *  --   --   --   --   --   --  128*  --  128*  --  129*   POTASSIUM 5.3 5.3  --   --  5.7*  --  7.0* 7.1*   < > 6.4*   < > 6.8*   CHLORIDE 100  --   --   --   --   --   --  99  --  99  --  99   CO2 19*  --   --   --   --   --   --  16*  --  17*  --  18*   ANIONGAP 14  --   --   --   --   --   --  13  --  12  --  12   GLC 76  --  134* 145*  --  75  --  84   < > 114*   < > 116*   BUN 90.3*  --   --   --   --   --   --  90.1*  --  90.3*  --  87.4*   CR 3.74*  --   --   --   --   --   --  3.16*  --  3.07*  --  3.01*   GFRESTIMATED 13*  --   --   --   --   --   --  16*  --  16*  --  17*   SONIA 7.3*  --   --   --   --   --   --  7.8*  --  8.5*  --  7.7*   MAG  --   --   --   --   --   --   --  1.8  --   --   --   --    PROTTOTAL 4.5*  --   --   --   --   --   --   --   --  5.6*  --  5.2*   ALBUMIN 2.6*  --   --   --   --   --   --   --   --   --   --  2.4*   BILITOTAL <0.2  --   --   --   --   --   --   --   --   --   --  <0.2   ALKPHOS 43  --   --   --   --   --   --   --   --   --    --  63   AST 15  --   --   --   --   --   --   --   --   --   --  27   ALT 18  --   --   --   --   --   --   --   --   --   --  28    < > = values in this interval not displayed.     CBC  Recent Labs   Lab 06/23/24  0609 06/22/24  0541 06/22/24  0220 06/21/24  1449 06/21/24  0959   WBC 6.1 9.3  --  4.7 5.0   RBC 2.14* 2.83*  --  2.72* 2.84*   HGB 6.4* 8.9* 9.1* 8.4* 8.6*   HCT 20.9* 27.2*  --  26.5* 27.3*   MCV 98 96  --  97 96   MCH 29.9 31.4  --  30.9 30.3   MCHC 30.6* 32.7  --  31.7 31.5   RDW 14.7 14.1  --  14.5 14.6    247  --  248 265     INRNo lab results found in last 7 days.  Lab Results   Component Value Date    TROPI <0.015 06/10/2020    TROPI <0.015 02/16/2020    TROPI <0.015 09/18/2019    TROPONIN <0.07 09/04/2005     Recent Labs   Lab Test 10/03/23  0941 02/15/22  0632   CHOL 137 124   HDL 54 48*   LDL 69 61   TRIG 68 76     Lab Results   Component Value Date    A1C 4.6 06/21/2024    A1C 5.2 01/08/2024    A1C 5.3 02/15/2022    A1C 5.4 09/23/2020    A1C 5.5 06/08/2020    A1C 5.4 05/11/2016    A1C 5.6 03/03/2016    A1C 5.4 07/30/2002     TSH   Date Value Ref Range Status   06/22/2024 2.07 0.30 - 4.20 uIU/mL Final   01/05/2021 0.77 0.40 - 4.00 mU/L Final

## 2024-06-23 NOTE — PLAN OF CARE
Goal Outcome Evaluation:    Orientations: A/Ox4, pleasant and calm  Vitals/Pain: Bradycardic at times otherwise VSS on oxymask 8l/min. LS are diminished in L lobes, clear in R lobes. EMMANUEL. Pain managed w/ PRN tylenol and PRN oxycodone x1. PRN TUMS provided for heartburn x1.  Tele: SR/SB w/ peaked T waves  Lines/Drains: PIV x1 CDI infusing D10 bolus at 75ml/hr  Skin/Wounds: R AKA, wound vac in place. Scattered bruising and abrasion. Blanchable redness to sacrum/coccyx mepilex in place. BUE and BLE edema. Blanchable redness to LLE.   GI/: No UOP during shift. BM x1 soft.  Labs: Abnormal/Trends, Electrolyte Replacement- Trending potassium q4h 7.0, 5.7  Ambulation/Assist: Assist x2, pt weight shifting appropriately in bed.  Diet: Renal 2g potassium and cardiac diet, gluten free, w/ 1800ml fluid restriction.   Plan: Plan for PICC line placement w/ vascular access/

## 2024-06-23 NOTE — PROVIDER NOTIFICATION
"MD Notification    Notified Person: MD    Notified Person Name: Devin Ravi     Notification Date/Time: 6/23/24     Notification Interaction: vocera    Purpose of Notification: \"3305 kolby Hendricks hbg after first unit of RBC was 9.7, just confirming do you want second unit given?\"    Orders Received:    Comments: \"Recheck hbg via peripheral skin draw, can hold second unit of PRBC\"    Addendum 6/23/24 1615 to Dr. Ravi via vocera \"skin lab draw hbg resulted at 10.3, just want to confirm to hold second unit of blood?     - 6/23/24 1813 Dr. Ravi \"yes hold second unit of blood\"  "

## 2024-06-23 NOTE — CONSULTS
63 year old female who has complex medical history which includes GERD, hypertension, history of B-cell lymphoma, hyperlipidemia, depression, anxiety, MI, peripheral vascular disease status angioplasty, DLE, COPD, CMT disease, tobacco use, CKD stage III with baseline creatinine around 1.9-2.8, who presented to the ER from her clinic as she was told that she has a lot of fluid around her lungs and diagnosed with significantly large left pleural effusion, moderate right pleural effusion, NSTEMI, hyperkalemia, hyponatremia and admitted on 6/21/24   Acute on Chronic anemia  Hb 6.4  On 10 L O2  Supportive care    Full consult today    Israel Layne MD FACP  [facility]RAVES

## 2024-06-23 NOTE — PROVIDER NOTIFICATION
MD Notification    Notified Person: MD    Notified Person Name:  Quiana    Notification Date/Time: 6/23/24 at 0335    Notification Interaction: Amcom    Purpose of Notification: Pt c/o cough. Sounds congested. Saying chest hurts from coughing so much. Is it possible to do nebs, cough suppressant, or decongestant?    Orders Received: See new order for mucinex    Comments:

## 2024-06-23 NOTE — PROGRESS NOTES
Renal Medicine Progress Note            Assessment/Plan:     Assessment:    LIMA on CKD 3b   Had recent LIMA requiring dialysis, but seemed to have recovered some function, eGFR from last discharge 33, Cr 1.7. Had hospital follow up visit with Cr 2.4, unclear if any recommendations were made to her at that time. Cr increasing, and anuric.  No uremic symptoms, but has severe hyperkalemia and hypervolemia. Trialed high dose diuretics with no UOP. Remains on high O2 requirements, thus we initiated dialysis 6/23.   - IR consulted for tunneled dialysis catheter   - HD today, again tomorrow if needed (will assess in AM)  - strict I/Os   - daily weights   - avoid nephrotoxins   - renally dose meds   - daily BMP      Severe hyperkalemia, improved   She did not follow low K diet on discharge. K 7.5 at highest. EKG with slight bradyardia and PVCs, but no peaked T waves. We were able to manage medically, but now getting dialysis.   - discontinue lokelma      Acute on chronic hypoxic respiratory failure   Anasarca  Hypoalbuminemia   Large L pleural effusion with collapse of NOMAN, LLL   Moderate R pleural effusion  Occlusion of L mainstem bronchus    Above findings noted on CT. Pro BNP 11,959. TTE with EF 60-65%, no significant valvular abnormalities. Underwent L thoracentesis (800ml, transudative) 6/21, repeat L thoracentesis (1.2L) 6/22, L chest tube place 6/23. Remains on HFNC/bipap. Didn't respond to diuretics, started HD 6/23. Concern that SOB is not drive by pulmonary edema, but attempting to UF as much as able.   - no further diuretics   - strict I/Os   - HD today, will reassess tomorrow   - pulmonology consulted, may benefit from bronch especially if no improvement in O2 requirements after dialysis      NAGMA  Due to renal insufficiency. Holding off on bicarb supplementation given anasarca.       Plan/Recs:  1) IR placed tunneled catheter   2) HD today, will reassess if needed tomorrow for volume   3) hep B and quant gold  sent for outpatient dialysis planning if no renal recovery       Discussed with Dr. Ravi and Dr. Salomon. Reviewed notes from Dr. Ravi (hospitalist), Dr. Salomon (IR), Dr. Dillard (cardiology), Dr. Layne (GI).     Kiana Joseph MD   [facility]  Office: [phone]          Interval History:     No UOP, PVR with minimal output   Between HFNC and bipap throughout day, though denies SOB   Denies n/v   Has hunger   No dysgeusia   Upset that we needed to start dialysis, but understanding. Has had a bad year including the death of her  around 10 days prior to admission.            Medications and Allergies:     Current Facility-Administered Medications   Medication Dose Route Frequency Provider Last Rate Last Admin    - MEDICATION INSTRUCTIONS for Dialysis Patients -   Does not apply See Admin Instructions Love Gross ANA ROSA        [Held by provider] amLODIPine (NORVASC) tablet 10 mg  10 mg Oral Daily Bruce Ulloa MD        [Held by provider] clopidogrel (PLAVIX) tablet 75 mg  75 mg Oral Daily Bruce Ulloa MD   75 mg at 06/23/24 0830    epoetin mireya-epbx (RETACRIT) injection 10,000 Units  10,000 Units Intravenous Once in dialysis/CRRT Kiana Joseph MD        famotidine (PEPCID) tablet 10 mg  10 mg Oral Daily Devin Ravi MD   10 mg at 06/23/24 0830    fluticasone-vilanterol (BREO ELLIPTA) 200-25 MCG/ACT inhaler 1 puff  1 puff Inhalation Daily Bruce Ulloa MD   1 puff at 06/23/24 0834    sodium chloride 0.9% DIALYSIS Cath LOCK - BLUE Lumen  1.3-2.6 mL Intracatheter Once Leonor Salomon, DO        Followed by    heparin 1000 unit/mL DIALYSIS Cath LOCK - BLUE Lumen  3 mL Intracatheter Once Leonor Salomon DO        sodium chloride 0.9% DIALYSIS Cath LOCK - RED Lumen  1.3-2.6 mL Intracatheter Once Leonor Salomon DO        Followed by    heparin 1000 unit/mL DIALYSIS Cath LOCK - RED Lumen  3 mL Intracatheter Once Leonor Salomon  DO Jaqueline        sodium chloride 0.9% DIALYSIS Cath LOCK - RED Lumen  10 mL Intracatheter Once in dialysis/CRRT Kiana Joseph MD        Followed by    heparin 1000 unit/mL DIALYSIS Cath LOCK - RED Lumen  1.3-2.6 mL Intracatheter Once in dialysis/CRRT Kiana Joseph MD        sodium chloride 0.9% DIALYSIS Cath LOCK - BLUE Lumen  10 mL Intracatheter Once in dialysis/CRRT Kiana Joseph MD        Followed by    heparin 1000 unit/mL DIALYSIS Cath LOCK -BLUE Lumen  1.3-2.6 mL Intracatheter Once in dialysis/CRRT Kiana Joseph MD        nicotine (NICODERM CQ) 14 MG/24HR 24 hr patch 1 patch  1 patch Transdermal Daily Bruce Ulloa MD   1 patch at 06/23/24 0834    No heparin via hemodialysis machine   Does not apply Once Kiana Joseph MD        polyethylene glycol (MIRALAX) Packet 17 g  17 g Oral Daily Bruce Ulloa MD        [Held by provider] rosuvastatin (CRESTOR) tablet 10 mg  10 mg Oral At Bedtime Bruce Ulloa MD   10 mg at 06/22/24 2159    saccharomyces boulardii (FLORASTOR) capsule 250 mg  250 mg Oral BID Bruce Ulloa MD   250 mg at 06/23/24 0830    sodium chloride (PF) 0.9% PF flush 3 mL  3 mL Intracatheter Q8H Bruce Ulloa MD   3 mL at 06/22/24 1515    sodium chloride 0.9% BOLUS 250 mL  250 mL Intravenous Once in dialysis/CRRT Kiana Joseph MD        sodium chloride 0.9% BOLUS 300 mL  300 mL Hemodialysis Machine Once Kiana Joseph MD        sodium zirconium cyclosilicate (LOKELMA) packet 10 g  10 g Oral TID Kiana Joseph MD   10 g at 06/23/24 0834    Followed by    [START ON 6/24/2024] sodium zirconium cyclosilicate (LOKELMA) packet 10 g  10 g Oral Daily Kiana Joseph MD            Allergies   Allergen Reactions    Contrast Dye Anaphylaxis     Pt reported facial and throat swelling with prior CT contrast    Pantoprazole      Protonix caused diffuse edema    Chantix [Varenicline]      Terrible dreams    Penicillins Itching and Rash             Physical Exam:   Vitals were reviewed  /60   Pulse 62   Temp 98.3  F (36.8  C) (Oral)   Resp 16   LMP  (LMP Unknown)   SpO2 99%     Wt Readings from Last 3 Encounters:   06/05/24 60.3 kg (132 lb 15 oz)   05/29/24 62.3 kg (137 lb 5.6 oz)   05/14/24 57 kg (125 lb 10.6 oz)       Intake/Output Summary (Last 24 hours) at 6/23/2024 1343  Last data filed at 6/23/2024 1250  Gross per 24 hour   Intake 1140 ml   Output 900 ml   Net 240 ml       GENERAL APPEARANCE: NAD  HEENT: normocephalic, dry MM   RESP: coarse  CV: RRR, systolic murmur  EXTREMITIES/SKIN: 3+ pitting edema  NEURO:  alert, oriented, normal speech            Data:     BMP  Recent Labs   Lab 06/23/24  0609 06/22/24  2218 06/22/24  2146 06/22/24  1918 06/22/24  1748 06/22/24  1713 06/22/24  1513 06/22/24  0541 06/21/24  1802 06/21/24  1749 06/21/24  1610 06/21/24  1449   *  --   --   --   --   --   --  128*  --  128*  --  129*   POTASSIUM 5.3 5.3  --   --  5.7*  --  7.0* 7.1*   < > 6.4*   < > 6.8*   CHLORIDE 100  --   --   --   --   --   --  99  --  99  --  99   SONIA 7.3*  --   --   --   --   --   --  7.8*  --  8.5*  --  7.7*   CO2 19*  --   --   --   --   --   --  16*  --  17*  --  18*   BUN 90.3*  --   --   --   --   --   --  90.1*  --  90.3*  --  87.4*   CR 3.74*  --   --   --   --   --   --  3.16*  --  3.07*  --  3.01*   GLC 76  --  134* 145*  --  75  --  84   < > 114*   < > 116*    < > = values in this interval not displayed.     CBC  Recent Labs   Lab 06/23/24  0849 06/23/24  0609 06/22/24  0541 06/22/24  0220 06/21/24  1449   WBC 5.5 6.1 9.3  --  4.7   HGB 6.4* 6.4* 8.9* 9.1* 8.4*   HCT 20.3* 20.9* 27.2*  --  26.5*   MCV 99 98 96  --  97    210 247  --  248     Lab Results   Component Value Date    AST 15 06/23/2024    ALT 18 06/23/2024    ALKPHOS 43 06/23/2024    BILITOTAL <0.2 06/23/2024    BILICONJ 0.0 01/23/2004     Lab Results   Component Value Date    INR 1.31 (H) 04/11/2024     Color Urine (no units)   Date Value    05/03/2024 Orange (A)   02/04/2019 Yellow     Appearance Urine (no units)   Date Value   05/03/2024 Cloudy (A)   02/04/2019 Clear     Glucose Urine (mg/dL)   Date Value   05/03/2024 Negative   02/04/2019 Negative     Bilirubin Urine (no units)   Date Value   05/03/2024 Negative   02/04/2019 Negative     Ketones Urine (mg/dL)   Date Value   05/03/2024 Negative   02/04/2019 Negative     Specific Gravity Urine (no units)   Date Value   05/03/2024 1.018   02/04/2019 1.010     pH Urine   Date Value   05/03/2024 6.0   02/04/2019 6.0 pH     Protein Albumin Urine (mg/dL)   Date Value   05/03/2024 200 (A)   02/04/2019 Negative     Urobilinogen Urine (EU/dL)   Date Value   02/04/2019 0.2     Nitrite Urine (no units)   Date Value   05/03/2024 Negative   02/04/2019 Negative     Leukocyte Esterase Urine (no units)   Date Value   05/03/2024 Large (A)   02/04/2019 Negative         Attestation:  I have reviewed today's vital signs, notes, medications, labs and imaging.    Kiana Joseph MD  [facility] - Nephrology  Office: [phone]

## 2024-06-23 NOTE — PLAN OF CARE
Goal Outcome Evaluation:    Orientations: A/O x4, pleasant and calm  Vitals/Pain: VSS on 11L via oxymask. LS are coarse crackles in BLL and diminished in upper lobes. EMMANUEL. Pain managed w/ PRN oral dilaudid x2. PRN zofran provided x1 for nausea.   Tele: SR w/ PVC's and peaked T waves  Lines/Drains: Double lumen PICC, CDI/SL, blood return noted. R internal jugular dialysis catheter. CT 1:1 to -20 suction w/ serosanguinous OP, dressing dry/intact w/ old drainage.   Skin/Wounds: R AKA, scattered bruising, old healed coccyx wound from PTA. Redness on L shin. Generalized +2 edema, +3 in L ankle/foot.   GI/: No UOP, pt on hemodialysis. BM x1 during shift  Labs: Abnormal/Trends, Electrolyte Replacement- hemoglobin 6.4, transfused 1 unit PRBC, recheck was 10.3. Potassium, 5.3  Ambulation/Assist: Assist x2, not OOB during shift. Pt weight shifts appropriately in bed.   Diet: 2g K, Renal, cardiac and gluten free diet  Plan: Plan for pulmonology consult tomorrow and WOC consult

## 2024-06-23 NOTE — PROGRESS NOTES
[facility]    Hospitalist Progress Note    Interval History   - PICC placed yesterday evening  - This  morning patient reports improved dyspnea but she is desattig on 10L O2 and will be placed back on HFNC  - She denies any blood loss, note Hgb 6.4 this morning, will receive 2u pRBC  - Patient is anuric, possibly dialysis soon per Nephrology  - New prominent systolic murmur, ?flow murmur  Addendum: Discussed with Pulmonologist Dr. Rogers, she recommends patient have a chest tube placed to fully drain effusion, and repeat CT scan thereafter to determine if patient has an endobronchial lesion. Patient to get TDC with IR today so will have IR place a left chest tube as well.    Assessment & Plan   Summary: Shirley Hendricks is a 63 year old female with PMH CAD, MI, peripheral vascular disease status angioplasty and right AKA with wound vac, DLE, COPD, CMT disease, tobacco use, CKD stage III, GERD, hypertension, history of B-cell lymphoma, hyperlipidemia, depression, anxiety, who was admitted on 6/21/2024 with acute hypoxic respiratory failure due to a large left pleural effusion, hyperkalemia, and hyponatremia.   Patient recently admitted to [facility] 3/29-4/22/24 with right limb ischemia in setting of severe PAD ultimately requiring right AKA with complicated post op course (including LIMA requiring HD) after which she was discharged to  ARU. She was readmitted to [facility] 5/15-5/29/24 with stump eschars requiring surgical debridement and wound VAC placement.   This admission, patient underwent left thoracentesis with 800mL and 1200mL fluid removed. Severe hyperkalemia improved with Lokelma. Patient has been anuric since admission.    Acute hypoxic respiratory failure  Large left pleural effusion, transudative  S/p thoracentesis 800mL 6/22, 1200mL 6/23  Right moderate pleural effusion  Patient with increasing shortness of breath for 1-2 weeks PTA. CXR 6/21 in ED reveals complete  opacification of the left hemithorax. CT chest-large left pleural effusion with complete collapse of the left upper and left lower and occlusion of left mainstem bronchus and a moderate size right pleural effusion. Patient underwent thoracentesis 800mL on 6/21, fluid appears transudative. Patient appears markedly volume overloaded. In the ED she initially required 2L O2 but was transitioned to HFNC overnight 6/22 to 6/23 during RRT.  Discussed with ICU attending and they mentioned that Dr. Rogers who is also interventional pulmonary will be rounding in the ICU on 6/22/24 and AM team can touch base with her if there are issues or ongoing concerns about occlusion of the bronchus.  Reports symptomatic improvement with thoracentesis 6/21 and 6/22, but continues to require 10+L O2 or HFNC on 6/23. Etiology of pleural effusion felt to be primarily due to anuric renal failure.  - Pulmonology consulted, likely to see Monday 6/24  - Continue IMC  - Repeat CXR today    Suspected acute diastolic congestive heart failure exacerbation  Elevated troponin suspect due to volume overload  Patient presents with large left pleural effusion, BNP  elevated to 11.9k. Note history of stress cardiomyopathy in Oct 2023 with EF 30-35% which then resolved on echo Nov 2023.    Patient does not have any significant chest discomfort and troponin are 174 and 177 and trending down to 156. Echo 6/22 shows Ef 60-65%, mid anterior and lateral severe hypokinessis, distal inferior hypokinesis, normal RV function. Suspect large component of renal failure contributing.  - Cardiology consult appreciated  - Diuresis per Nephrology, see below  - Tele, weights, I&O    Murmur 6/23  Noted systolic murmur 6/23, not heard 6/22  - Check limited echo    Anuric renal failure on CKD  Recent acute renal failure in May 2024 needing hemodialysis  Severe hyperkalemia, improved  Hyponatremia  Metabolic acidosis  Recent baseline creatinine has been fluctuating between  1.9-2.8. On admission Creatinine is 3.07, potassium is 7.5, sodium of 128 with bicarb of 17.   In the ER patient did receive albuterol, 2 g calcium gluconate, insulin, Kayexalate 10 g and 40 IV Lasix and 1 L IV fluid bolus. EKG showed sinus bradycardia with prolonged QT. Potassium has been elevated to 7.1 since admission, improved to 5.3 on 6/23 with Lokelma.  Patient has been anuric since admission, no urine output. Etiology of anuric renal failure is unclear.  - Appreciate Nephrology consult  - Bumex drip  - Lokelma  - Considering dialysis today    Acute on chronic anemia  Suspect anemia of chronic disease  Recent baseline hemoglobin has been fluctuating and has been between 8.4-10.1. Hemoglobin 8.4 on 6/22-->6.4 on 6/23. Patient denies blood loss. Bilirubin is not elevated makes hemolysis less likely. This is likely subclinical blood loss or bone marrow suppression or anemia related to kidney disease.  - Consent obtained and 2u PRBC ordered  - [facility] consult  - Check Hgb q12h    Hypertension  Hyperlipidemia  History of coronary disease with an MI in 2019  Left heart cath on 10/4/2023 showed-completely occluded D1 with left to left collaterals from distal LAD to D1, moderate CAD involving proximal to mid RCA not significant by IFR and moderate coronary disease please involving distal small caliber RPL and distal circumflex artery. Last echo 11/23 showed EF of 55 to 60% with normal RV and moderate trileaflet aortic sclerosis  - Hold amlodipine and Coreg    History of peripheral vascular disease status above-knee amputation on the right with wound dressing status washout on 5/24  Neuropathic pain  Patient has followed with vascular surgery and has noticed during last hospitalization in May 2024 she underwent washout with wound VAC placement and was discharged on Plavix and Crestor. On exam the amputation stump does not look infected and has wound vac in place   - Continue with PTA Plavix 75 mg daily  - Continues  "with wound vac, vascular surgery consulted  - Pain control with hydromorphone oral PRN (avoid oxycodone as it is renally cleared)    COPD not in exacerbation  - On exam has no wheezing and continue with as needed symbicort    GERD: Famotidine BID    History of B-cell lymphoma  - Follows with [place] [facility]  - Check cytology with thoracentesis above    Tobacco use  - Continue with nicotine patch    Ongoing stressors  Patient had a recent AKA in 2024 with complicated post op course 2024, and reports   2024.    Clinically Significant Risk Factors        # Hyperkalemia: Highest K = 7.5 mmol/L in last 2 days, will monitor as appropriate  # Hyponatremia: Lowest Na = 128 mmol/L in last 2 days, will monitor as appropriate      # Hypoalbuminemia: Lowest albumin = 2.4 g/dL at 2024  2:49 PM, will monitor as appropriate     # Hypertension: Noted on problem list             #Precipitous drop in Hgb/Hct: Lowest Hgb this hospitalization: 6.4 g/dL. Will continue to monitor and treat/transfuse as appropriate.     # Overweight: Estimated body mass index is 25.12 kg/m  as calculated from the following:    Height as of an earlier encounter on 24: 1.549 m (5' 1\").    Weight as of 24: 60.3 kg (132 lb 15 oz)., PRESENT ON ADMISSION       # Financial/Environmental Concerns:            PT/OT: ordered  Diet: Fluid restriction 1800 ML FLUID  NPO per Anesthesia Guidelines for Procedure/Surgery Except for: Meds    DVT Prophylaxis: none  Alvarez Catheter: Not present  Lines: PRESENT      PICC 24 Double Lumen Left Brachial vein medial access-Site Assessment: WDL except;Ecchymotic    Cardiac Monitoring: ACTIVE order. Indication: renal failure  Code Status: Full Code    Medically Ready for Discharge: Anticipated in 5+ Days      Devin Ravi MD  Hospitalist Service  [facility]  Securely message with [employer] (more info)  Text page via [employer] Paging/Directory     - Total " time spent on encounter is 55 minutes, which includes counseling, coordination of care, time spent discussing with family, and/or time spent discussing with consultants.    Data reviewed today: I reviewed all new labs and imaging results over the last 24 hours.    Physical Exam   Temp: 98.3  F (36.8  C) Temp src: Oral BP: 108/49 Pulse: 65   Resp: 10 SpO2: 98 % O2 Device: Oxymask Oxygen Delivery: 10 LPM  There were no vitals filed for this visit.  Vital Signs with Ranges  Temp:  [97.7  F (36.5  C)-98.5  F (36.9  C)] 98.3  F (36.8  C)  Pulse:  [55-72] 65  Resp:  [8-20] 10  BP: (108-129)/(49-66) 108/49  FiO2 (%):  [50 %] 50 %  SpO2:  [92 %-100 %] 98 %  I/O last 3 completed shifts:  In: 1080 [P.O.:680; I.V.:300]  Out: -   O2 requirements: Yes HFNC    Constitutional: Female appears somewhat ill  HEENT: Eyes nonicteric, oral mucosa moist  Cardiovascular: RRR, normal S1/2, no m/r/g  Respiratory: No breath sounds on left, Right relatively clear no wheezing or crackles  Vascular: Anasarca with UE and LLE pitting edema, note R AKA with wound vac  GI: Normoactive bowel sounds, nontender  Skin/Integumen: No rashes  Neuro/Psych: Appropriate affect and mood. A&Ox3, moves all extremities    Medications   Current Facility-Administered Medications   Medication Dose Route Frequency Provider Last Rate Last Admin    bumetanide (BUMEX) 0.25 mg/mL infusion  0.5 mg/hr Intravenous Continuous Kiana Joseph MD 2 mL/hr at 06/22/24 2159 0.5 mg/hr at 06/22/24 2159     Current Facility-Administered Medications   Medication Dose Route Frequency Provider Last Rate Last Admin    [Held by provider] amLODIPine (NORVASC) tablet 10 mg  10 mg Oral Daily Bruce Ulloa MD        clopidogrel (PLAVIX) tablet 75 mg  75 mg Oral Daily Bruce Ulloa MD   75 mg at 06/22/24 1103    fluticasone-vilanterol (BREO ELLIPTA) 200-25 MCG/ACT inhaler 1 puff  1 puff Inhalation Daily Bruce Ulloa MD   1 puff at 06/22/24 1103    [Held by provider] furosemide  (LASIX) injection 100 mg  100 mg Intravenous Q12H Kiana Joseph MD   100 mg at 06/22/24 0822    nicotine (NICODERM CQ) 14 MG/24HR 24 hr patch 1 patch  1 patch Transdermal Daily Bruce Ulola MD   1 patch at 06/22/24 0823    polyethylene glycol (MIRALAX) Packet 17 g  17 g Oral Daily Bruce Ulloa MD        [Held by provider] rosuvastatin (CRESTOR) tablet 10 mg  10 mg Oral At Bedtime Bruce Ulloa MD   10 mg at 06/22/24 2159    saccharomyces boulardii (FLORASTOR) capsule 250 mg  250 mg Oral BID Bruce Ulloa MD   250 mg at 06/22/24 2200    sodium chloride (PF) 0.9% PF flush 3 mL  3 mL Intracatheter Q8H Bruce Ulloa MD   3 mL at 06/22/24 1515    sodium zirconium cyclosilicate (LOKELMA) packet 10 g  10 g Oral TID Kiana Joseph MD   10 g at 06/23/24 0029    Followed by    [START ON 6/24/2024] sodium zirconium cyclosilicate (LOKELMA) packet 10 g  10 g Oral Daily Kiana Joseph MD           Data   Recent Labs   Lab 06/23/24  0609 06/22/24  2218 06/22/24  2146 06/22/24  1918 06/22/24  1748 06/22/24  1513 06/22/24  0541 06/22/24  0220 06/21/24  1802 06/21/24  1749 06/21/24  1610 06/21/24  1449   WBC 6.1  --   --   --   --   --  9.3  --   --   --   --  4.7   HGB 6.4*  --   --   --   --   --  8.9* 9.1*  --   --   --  8.4*   MCV 98  --   --   --   --   --  96  --   --   --   --  97     --   --   --   --   --  247  --   --   --   --  248   *  --   --   --   --   --  128*  --   --  128*  --  129*   POTASSIUM 5.3 5.3  --   --  5.7*   < > 7.1* 7.1*   < > 6.4*   < > 6.8*   CHLORIDE 100  --   --   --   --   --  99  --   --  99  --  99   CO2 19*  --   --   --   --   --  16*  --   --  17*  --  18*   BUN 90.3*  --   --   --   --   --  90.1*  --   --  90.3*  --  87.4*   CR 3.74*  --   --   --   --   --  3.16*  --   --  3.07*  --  3.01*   ANIONGAP 14  --   --   --   --   --  13  --   --  12  --  12   SONIA 7.3*  --   --   --   --   --  7.8*  --   --  8.5*  --  7.7*   GLC 76  --  134* 145*  --    < >  84  --    < > 114*   < > 116*   ALBUMIN 2.6*  --   --   --   --   --   --   --   --   --   --  2.4*   PROTTOTAL 4.5*  --   --   --   --   --   --   --   --  5.6*  --  5.2*   BILITOTAL <0.2  --   --   --   --   --   --   --   --   --   --  <0.2   ALKPHOS 43  --   --   --   --   --   --   --   --   --   --  63   ALT 18  --   --   --   --   --   --   --   --   --   --  28   AST 15  --   --   --   --   --   --   --   --   --   --  27    < > = values in this interval not displayed.       Imaging:   Recent Results (from the past 24 hour(s))   Echocardiogram Complete   Result Value    LVEF  60-65%    Narrative    [id number]  [id number]  [id number]  [id number]^JUANIS^OMID     [facility]  Echocardiography Laboratory  [address] [place] [address]     Name: KASIA WAN  MRN: [id number]  : 1960  Study Date: 2024 11:46 AM  Age: 63 yrs  Gender: Female  Patient Location: [facility]  Reason For Study: SOB  Ordering Physician: OMID OLIVAREZ  Referring Physician: Vasquez Benoit  Performed By: Saúl Hoffman     BSA: 1.6 m2  Height: 61 in  Weight: 132 lb  HR: 59  BP: 115/53 mmHg  ______________________________________________________________________________  Procedure  Complete Portable Echo Adult. Optison (NDC #[id number]) given intravenously.  ______________________________________________________________________________  Interpretation Summary     Left ventricular systolic function is normal.  The visual ejection fraction is 60-65%.  Mid anterior and lateral severe hypokinesis  Distal inferior hypokinesis  The right ventricular systolic function is normal.  Atelectasis vs consolidation in left lung with associated effusion. No  hemodynamically significant valvular abnormalities on 2D or color flow  imaging. Compared to prior study, changes are noted.  ______________________________________________________________________________  Left Ventricle  The left ventricle is normal in size. Left  ventricular systolic function is  normal. The visual ejection fraction is 60-65%. Grade I or early diastolic  dysfunction. Mid anterior and lateral severe hypokinesis  Distal inferior hypokinesis.     Right Ventricle  The right ventricle is not well visualized. The right ventricular systolic  function is normal.     Atria  The left atrium is not well visualized. The left atrium is moderately dilated.  Right atrium not well visualized. The right atrium is mildly dilated.     Mitral Valve  The mitral valve is normal in structure and function. There is trace mitral  regurgitation.     Tricuspid Valve  The tricuspid valve is not well visualized. The right ventricular systolic  pressure is approximated at 23.9 mmHg plus the right atrial pressure.     Aortic Valve  The aortic valve is not well visualized. AV leaflets not well visualized but  appear calcified. There is trace aortic regurgitation. No hemodynamically  significant valvular aortic stenosis.     Pulmonic Valve  The pulmonic valve is not well visualized.     Vessels  Normal size aorta. The inferior vena cava is normal.     Pericardium  Trivial pericardial effusion.     ______________________________________________________________________________  MMode/2D Measurements & Calculations  IVSd: 0.82 cm  LVIDd: 5.1 cm  LVIDs: 3.6 cm  LVPWd: 0.86 cm  FS: 28.3 %  LV mass(C)d: 147.9 grams  LV mass(C)dI: 93.4 grams/m2     Ao root diam: 3.1 cm  LA dimension: 3.2 cm  asc Aorta Diam: 2.9 cm  LA/Ao: 1.0  LVOT diam: 2.0 cm  LVOT area: 3.0 cm2  Ao root diam index Ht(cm/m): 2.0  Ao root diam index BSA (cm/m2): 1.9  Asc Ao diam index BSA (cm/m2): 1.8  Asc Ao diam index Ht(cm/m): 1.9  LA Volume (BP): 58.8 ml  LA Volume Index (BP): 37.2 ml/m2  RWT: 0.34     TAPSE: 2.9 cm     Doppler Measurements & Calculations  MV E max ric: 113.0 cm/sec  MV A max ric: 110.1 cm/sec  MV E/A: 1.0  MV dec slope: 372.1 cm/sec2  MV dec time: 0.30 sec  PA acc time: 0.14 sec  TR max ric: 244.3 cm/sec  TR  max P.9 mmHg  E/E' av.1  Lateral E/e': 13.1  Medial E/e': 13.1  RV S Perfecto: 11.7 cm/sec     ______________________________________________________________________________  Report approved by: Cheryl Camejo 2024 12:59 PM         US Thoracentesis    Narrative    ULTRASOUND GUIDED THORACENTESIS  2024 2:05 PM     HISTORY: large left pleural effusion, shortness of breath    FINDINGS: Limited ultrasound was performed to evaluate for the  presence and best approach for drainage of a pleural effusion. An  image is archived. Written and oral informed consent was obtained. A  pause for the cause procedure to verify the correct patient and  correct procedure.     The skin overlying the left chest posteriorly was prepped and draped  in the usual sterile fashion. The subcutaneous tissues were  anesthetized with 1% lidocaine. Under direct ultrasound guidance a  catheter was advanced into the pleural space and 1200 mL of  dario  colored fluid was drained. The catheter was removed and a sterile  dressing was applied.     Patient was monitored by nurse under my direct supervision throughout  the exam. Ultrasound images were permanently stored.  There were no  immediate complications. Patient left the ultrasound suite in  satisfactory condition.      Impression    IMPRESSION: Technically successful thoracentesis without immediate  complications.    GHASSAN COLLAZO DO         SYSTEM ID:  [id number]

## 2024-06-23 NOTE — PROGRESS NOTES
Potassium   Date Value Ref Range Status   06/23/2024 5.3 3.4 - 5.3 mmol/L Final   12/29/2022 4.3 3.4 - 5.3 mmol/L Final   07/09/2021 5.2 3.4 - 5.3 mmol/L Final     Hemoglobin   Date Value Ref Range Status   06/23/2024 9.7 (L) 11.7 - 15.7 g/dL Final   07/09/2021 8.8 (L) 11.7 - 15.7 g/dL Final     Creatinine   Date Value Ref Range Status   06/23/2024 3.74 (H) 0.51 - 0.95 mg/dL Final   07/09/2021 0.77 0.52 - 1.04 mg/dL Final     Urea Nitrogen   Date Value Ref Range Status   06/23/2024 90.3 (H) 8.0 - 23.0 mg/dL Final   12/29/2022 17 7 - 30 mg/dL Final   07/09/2021 13 7 - 30 mg/dL Final     Sodium   Date Value Ref Range Status   06/23/2024 133 (L) 135 - 145 mmol/L Final     Comment:     Reference intervals for this test were updated on 09/26/2023 to more accurately reflect our healthy population. There may be differences in the flagging of prior results with similar values performed with this method. Interpretation of those prior results can be made in the context of the updated reference intervals.    07/09/2021 132 (L) 133 - 144 mmol/L Final     INR   Date Value Ref Range Status   04/11/2024 1.31 (H) 0.85 - 1.15 Final   09/24/2020 1.01 0.86 - 1.14 Final       DIALYSIS PROCEDURE NOTE  Hepatitis status of previous patient on machine log was checked and verified ok to use with this patients hepatitis status.  Patient dialyzed for 2.5 hrs. on a K2 bath with a net fluid removal of 3L.  A BFR of 300 ml/min was obtained via a Right internal jugular CVC catheter.  The treatment plan was discussed with Dr. Joseph during the treatment.    Total heparin received during the treatment: 0 units.     Line flushed, clamped and capped with heparin 1:1000 1.6 mL (1600 units) per lumen    Meds given: 10,000 units epoetin and 1 unit PRBC transfused (blood fluid component removed) during dialysis    Complications: None      Person educated: patient. Knowledge base basic. Barriers to learning: none. Educated on procedure, medication,  fluid management and access care via oral mode. Patient verbalized understanding. Pt prefers verbal education style.     ICEBOAT? Timeout performed pre-treatment  I: Patient was identified using 2 identifiers  C:  Consent Signed Yes  E: Equipment preventative maintenance is current and dialysis delivery system OK to use  B: Hepatitis B Surface Antigen: Unknown; Draw Date: 6/23/2024      Hepatitis B Surface Antibody: Unknown; Draw Date: 6/23/2024  O: Dialysis orders present and complete prior to treatment  A: Vascular access verified and assessed prior to treatment  T: Treatment was performed at a clinically appropriate time  ?: Patient was allowed to ask questions and address concerns prior to treatment  See Adult Hemodialysis flowsheet in [employer] for further details and post assessment.  Machine water alarm in place and functioning. Transducer pods intact and checked every 15min.   Pt dialyzed bedside while on high oxygen demand (was BiPAP predialysis and 15L/min oxymask during treatment)  Chlorine/Chloramine water system checked every 4 hours.  Outpatient Dialysis outpatient TBD    Patient repositioned every 2 hours during the treatment.  Post treatment report given to BAHMAN Batista RN regarding 3L of fluid removed, last BP of 138/63, and patient generalized pain rating of 4/10.

## 2024-06-24 ENCOUNTER — APPOINTMENT (OUTPATIENT)
Dept: CARDIOLOGY | Facility: CLINIC | Age: 64
DRG: 205 | End: 2024-06-24
Attending: INTERNAL MEDICINE
Payer: COMMERCIAL

## 2024-06-24 ENCOUNTER — APPOINTMENT (OUTPATIENT)
Dept: CT IMAGING | Facility: CLINIC | Age: 64
DRG: 205 | End: 2024-06-24
Attending: INTERNAL MEDICINE
Payer: COMMERCIAL

## 2024-06-24 ENCOUNTER — MEDICAL CORRESPONDENCE (OUTPATIENT)
Dept: HEALTH INFORMATION MANAGEMENT | Facility: CLINIC | Age: 64
End: 2024-06-24

## 2024-06-24 DIAGNOSIS — Z53.9 DIAGNOSIS NOT YET DEFINED: Primary | ICD-10-CM

## 2024-06-24 LAB
ANION GAP SERPL CALCULATED.3IONS-SCNC: 11 MMOL/L (ref 7–15)
BUN SERPL-MCNC: 50.8 MG/DL (ref 8–23)
CALCIUM SERPL-MCNC: 7.3 MG/DL (ref 8.8–10.2)
CHLORIDE SERPL-SCNC: 97 MMOL/L (ref 98–107)
CREAT SERPL-MCNC: 2.84 MG/DL (ref 0.51–0.95)
DEPRECATED HCO3 PLAS-SCNC: 25 MMOL/L (ref 22–29)
EGFRCR SERPLBLD CKD-EPI 2021: 18 ML/MIN/1.73M2
ERYTHROCYTE [DISTWIDTH] IN BLOOD BY AUTOMATED COUNT: 15.7 % (ref 10–15)
GLUCOSE SERPL-MCNC: 88 MG/DL (ref 70–99)
HCT VFR BLD AUTO: 29.2 % (ref 35–47)
HGB BLD-MCNC: 9.5 G/DL (ref 11.7–15.7)
HGB BLD-MCNC: 9.5 G/DL (ref 11.7–15.7)
LVEF ECHO: NORMAL
MCH RBC QN AUTO: 31 PG (ref 26.5–33)
MCHC RBC AUTO-ENTMCNC: 32.5 G/DL (ref 31.5–36.5)
MCV RBC AUTO: 95 FL (ref 78–100)
PATH REPORT.COMMENTS IMP SPEC: NORMAL
PATH REPORT.FINAL DX SPEC: NORMAL
PATH REPORT.MICROSCOPIC SPEC OTHER STN: NORMAL
PATH REPORT.MICROSCOPIC SPEC OTHER STN: NORMAL
PLATELET # BLD AUTO: 201 10E3/UL (ref 150–450)
POTASSIUM SERPL-SCNC: 3.8 MMOL/L (ref 3.4–5.3)
RBC # BLD AUTO: 3.06 10E6/UL (ref 3.8–5.2)
SODIUM SERPL-SCNC: 133 MMOL/L (ref 135–145)
WBC # BLD AUTO: 5.6 10E3/UL (ref 4–11)

## 2024-06-24 PROCEDURE — 94640 AIRWAY INHALATION TREATMENT: CPT | Mod: 76

## 2024-06-24 PROCEDURE — 85027 COMPLETE CBC AUTOMATED: CPT | Performed by: INTERNAL MEDICINE

## 2024-06-24 PROCEDURE — 93325 DOPPLER ECHO COLOR FLOW MAPG: CPT | Mod: 26 | Performed by: INTERNAL MEDICINE

## 2024-06-24 PROCEDURE — 94799 UNLISTED PULMONARY SVC/PX: CPT

## 2024-06-24 PROCEDURE — 250N000009 HC RX 250: Performed by: INTERNAL MEDICINE

## 2024-06-24 PROCEDURE — 71250 CT THORAX DX C-: CPT

## 2024-06-24 PROCEDURE — 99223 1ST HOSP IP/OBS HIGH 75: CPT | Mod: 24 | Performed by: INTERNAL MEDICINE

## 2024-06-24 PROCEDURE — 250N000011 HC RX IP 250 OP 636: Mod: JZ | Performed by: HOSPITALIST

## 2024-06-24 PROCEDURE — 258N000003 HC RX IP 258 OP 636: Mod: JZ | Performed by: INTERNAL MEDICINE

## 2024-06-24 PROCEDURE — 255N000002 HC RX 255 OP 636: Performed by: INTERNAL MEDICINE

## 2024-06-24 PROCEDURE — 85060 BLOOD SMEAR INTERPRETATION: CPT | Performed by: PATHOLOGY

## 2024-06-24 PROCEDURE — 99233 SBSQ HOSP IP/OBS HIGH 50: CPT | Performed by: INTERNAL MEDICINE

## 2024-06-24 PROCEDURE — 93325 DOPPLER ECHO COLOR FLOW MAPG: CPT

## 2024-06-24 PROCEDURE — 80048 BASIC METABOLIC PNL TOTAL CA: CPT | Performed by: INTERNAL MEDICINE

## 2024-06-24 PROCEDURE — 250N000011 HC RX IP 250 OP 636: Performed by: INTERNAL MEDICINE

## 2024-06-24 PROCEDURE — 94660 CPAP INITIATION&MGMT: CPT

## 2024-06-24 PROCEDURE — 93321 DOPPLER ECHO F-UP/LMTD STD: CPT | Mod: 26 | Performed by: INTERNAL MEDICINE

## 2024-06-24 PROCEDURE — 250N000013 HC RX MED GY IP 250 OP 250 PS 637: Performed by: STUDENT IN AN ORGANIZED HEALTH CARE EDUCATION/TRAINING PROGRAM

## 2024-06-24 PROCEDURE — 93308 TTE F-UP OR LMTD: CPT | Mod: 26 | Performed by: INTERNAL MEDICINE

## 2024-06-24 PROCEDURE — 120N000013 HC R&B IMCU

## 2024-06-24 PROCEDURE — G0463 HOSPITAL OUTPT CLINIC VISIT: HCPCS | Mod: 25

## 2024-06-24 PROCEDURE — 99232 SBSQ HOSP IP/OBS MODERATE 35: CPT | Performed by: INTERNAL MEDICINE

## 2024-06-24 PROCEDURE — 94640 AIRWAY INHALATION TREATMENT: CPT

## 2024-06-24 PROCEDURE — 250N000011 HC RX IP 250 OP 636: Performed by: HOSPITALIST

## 2024-06-24 PROCEDURE — 250N000013 HC RX MED GY IP 250 OP 250 PS 637: Performed by: HOSPITALIST

## 2024-06-24 PROCEDURE — 250N000013 HC RX MED GY IP 250 OP 250 PS 637: Performed by: INTERNAL MEDICINE

## 2024-06-24 PROCEDURE — G0180 MD CERTIFICATION HHA PATIENT: HCPCS | Performed by: INTERNAL MEDICINE

## 2024-06-24 PROCEDURE — 999N000157 HC STATISTIC RCP TIME EA 10 MIN

## 2024-06-24 PROCEDURE — 90935 HEMODIALYSIS ONE EVALUATION: CPT

## 2024-06-24 RX ORDER — PROCHLORPERAZINE 25 MG
12.5 SUPPOSITORY, RECTAL RECTAL EVERY 12 HOURS PRN
Status: DISCONTINUED | OUTPATIENT
Start: 2024-06-24 | End: 2024-07-08 | Stop reason: HOSPADM

## 2024-06-24 RX ORDER — ACETYLCYSTEINE 200 MG/ML
4 SOLUTION ORAL; RESPIRATORY (INHALATION) EVERY 4 HOURS
Status: DISCONTINUED | OUTPATIENT
Start: 2024-06-24 | End: 2024-06-25

## 2024-06-24 RX ORDER — IPRATROPIUM BROMIDE AND ALBUTEROL SULFATE 2.5; .5 MG/3ML; MG/3ML
3 SOLUTION RESPIRATORY (INHALATION)
Status: DISCONTINUED | OUTPATIENT
Start: 2024-06-24 | End: 2024-07-06

## 2024-06-24 RX ORDER — PROCHLORPERAZINE MALEATE 5 MG
5 TABLET ORAL EVERY 6 HOURS PRN
Status: DISCONTINUED | OUTPATIENT
Start: 2024-06-24 | End: 2024-07-08 | Stop reason: HOSPADM

## 2024-06-24 RX ORDER — AMLODIPINE BESYLATE 10 MG/1
10 TABLET ORAL DAILY
Status: DISCONTINUED | OUTPATIENT
Start: 2024-06-24 | End: 2024-07-08 | Stop reason: HOSPADM

## 2024-06-24 RX ORDER — ALBUMIN (HUMAN) 12.5 G/50ML
50 SOLUTION INTRAVENOUS
Status: DISCONTINUED | OUTPATIENT
Start: 2024-06-24 | End: 2024-06-24

## 2024-06-24 RX ADMIN — IPRATROPIUM BROMIDE AND ALBUTEROL SULFATE 3 ML: .5; 3 SOLUTION RESPIRATORY (INHALATION) at 15:57

## 2024-06-24 RX ADMIN — FLUTICASONE FUROATE AND VILANTEROL TRIFENATATE 1 PUFF: 200; 25 POWDER RESPIRATORY (INHALATION) at 09:43

## 2024-06-24 RX ADMIN — HEPARIN SODIUM 2000 UNITS: 1000 INJECTION INTRAVENOUS; SUBCUTANEOUS at 15:36

## 2024-06-24 RX ADMIN — HYDROMORPHONE HYDROCHLORIDE 2 MG: 2 TABLET ORAL at 00:21

## 2024-06-24 RX ADMIN — HYDROMORPHONE HYDROCHLORIDE 2 MG: 2 TABLET ORAL at 21:28

## 2024-06-24 RX ADMIN — SODIUM CHLORIDE 300 ML: 9 INJECTION, SOLUTION INTRAVENOUS at 15:34

## 2024-06-24 RX ADMIN — Medication 250 MG: at 09:35

## 2024-06-24 RX ADMIN — PROCHLORPERAZINE EDISYLATE 5 MG: 5 INJECTION INTRAMUSCULAR; INTRAVENOUS at 21:43

## 2024-06-24 RX ADMIN — Medication 250 MG: at 21:26

## 2024-06-24 RX ADMIN — Medication: at 15:36

## 2024-06-24 RX ADMIN — ONDANSETRON 4 MG: 2 INJECTION INTRAMUSCULAR; INTRAVENOUS at 06:15

## 2024-06-24 RX ADMIN — ACETAMINOPHEN 650 MG: 325 TABLET, FILM COATED ORAL at 21:27

## 2024-06-24 RX ADMIN — FAMOTIDINE 10 MG: 10 TABLET, FILM COATED ORAL at 09:35

## 2024-06-24 RX ADMIN — CLOPIDOGREL BISULFATE 75 MG: 75 TABLET ORAL at 09:35

## 2024-06-24 RX ADMIN — NICOTINE 1 PATCH: 14 PATCH, EXTENDED RELEASE TRANSDERMAL at 08:49

## 2024-06-24 RX ADMIN — AMLODIPINE BESYLATE 10 MG: 10 TABLET ORAL at 17:46

## 2024-06-24 RX ADMIN — ACETYLCYSTEINE 4 ML: 200 SOLUTION ORAL; RESPIRATORY (INHALATION) at 15:59

## 2024-06-24 RX ADMIN — HUMAN ALBUMIN MICROSPHERES AND PERFLUTREN 9 ML: 10; .22 INJECTION, SOLUTION INTRAVENOUS at 08:17

## 2024-06-24 RX ADMIN — HYDROMORPHONE HYDROCHLORIDE 2 MG: 2 TABLET ORAL at 17:46

## 2024-06-24 RX ADMIN — ACETYLCYSTEINE 4 ML: 200 SOLUTION ORAL; RESPIRATORY (INHALATION) at 12:03

## 2024-06-24 RX ADMIN — HEPARIN SODIUM 2000 UNITS: 1000 INJECTION INTRAVENOUS; SUBCUTANEOUS at 15:35

## 2024-06-24 RX ADMIN — IPRATROPIUM BROMIDE AND ALBUTEROL SULFATE 3 ML: .5; 3 SOLUTION RESPIRATORY (INHALATION) at 12:03

## 2024-06-24 RX ADMIN — SODIUM CHLORIDE 250 ML: 9 INJECTION, SOLUTION INTRAVENOUS at 15:34

## 2024-06-24 RX ADMIN — ONDANSETRON 4 MG: 2 INJECTION INTRAMUSCULAR; INTRAVENOUS at 20:29

## 2024-06-24 ASSESSMENT — ACTIVITIES OF DAILY LIVING (ADL)
ADLS_ACUITY_SCORE: 42
ADLS_ACUITY_SCORE: 42
ADLS_ACUITY_SCORE: 43
ADLS_ACUITY_SCORE: 42
ADLS_ACUITY_SCORE: 42
ADLS_ACUITY_SCORE: 43
ADLS_ACUITY_SCORE: 43
ADLS_ACUITY_SCORE: 42
ADLS_ACUITY_SCORE: 50
ADLS_ACUITY_SCORE: 49
ADLS_ACUITY_SCORE: 43
ADLS_ACUITY_SCORE: 50
ADLS_ACUITY_SCORE: 42
ADLS_ACUITY_SCORE: 43
ADLS_ACUITY_SCORE: 42
ADLS_ACUITY_SCORE: 49
ADLS_ACUITY_SCORE: 43
ADLS_ACUITY_SCORE: 42
ADLS_ACUITY_SCORE: 42
ADLS_ACUITY_SCORE: 43
ADLS_ACUITY_SCORE: 42
ADLS_ACUITY_SCORE: 42
ADLS_ACUITY_SCORE: 43

## 2024-06-24 NOTE — PROGRESS NOTES
HEMODIALYSIS TREATMENT NOTE    Date: 6/24/2024  Time: 3:58 PM    Data:  Pre Wt:  58.3   Post Wt:  54.3  Weight change:  4 kg  Ultrafiltration - Post Run Net Total Removed (mL): 3000 mL  Vascular Access Status: CVC  patent  Dialyzer Rinse: Light, Streaked  Total Blood Volume Processed: 43.8 L   Total Dialysis (Treatment) Time: 2.5 hours   Dialysate Bath: K 3, Ca 2.25  Heparin: None    Lab:   No    Interventions:  none    Assessment:  Patient tolerated dialysis well. Able to wean down O2 needs as treatment progressed.      Plan:    Dialysis on Wednesday.

## 2024-06-24 NOTE — PROGRESS NOTES
[facility]  Gastroenterology Progress Note     Shirley Hendricks MRN# [id number]   YOB: 1960 Age: 63 year old          Assessment and Plan:   Shirley Hendricks is a 63 year old female who has complex medical history which includes GERD, hypertension, history of B-cell lymphoma, hyperlipidemia, depression, anxiety, MI, peripheral vascular disease status angioplasty, DLE, COPD, CMT disease, tobacco use, CKD stage III with baseline creatinine around 1.9-2.8, who presented to the ER from her clinic as she was told that she has a lot of fluid around her lungs and diagnosed with significantly large left pleural effusion, moderate right pleural effusion, NSTEMI, hyperkalemia, hyponatremia and admitted on 6/21/24      Recent Labs   Lab 06/24/24  0633 06/23/24  1646 06/23/24  1549 06/23/24  0849 06/23/24  0609   HGB 9.5*  9.5* 10.3* 9.7* 6.4* 6.4*       Acute on Chronic anemia  Anemia of chronic disease   *Recent baseline hemoglobin has been fluctuating and has been between 8.4-10.1. Hemoglobin 8.4 on 6/22-->6.4 on 6/23. Patient denies blood loss. Bilirubin is not elevated makes hemolysis less likely. Patient was given 1u pRBC, recheck Hgb was 9.5 via peripheral draw. The Hgb of 6.4 likely was spurious related to an inaccurate PICC line draw, likely Hgb was 8.5-->9.5 with 1u pRBC.  --On 10 L O2  --Supportive care  --Monitor Hgb  --GI will follow          Interval History:     On HFNC, Denies cp, sob, n/v/d, or abd pain.              Review of Systems:     C: NEGATIVE for fever, chills, change in weight  E/M: NEGATIVE for ear, mouth and throat problems  R: NEGATIVE for significant cough or SOB  CV: NEGATIVE for chest pain, palpitations or peripheral edema             Medications:   I have reviewed this patient's current medications  Current Facility-Administered Medications   Medication Dose Route Frequency Provider Last Rate Last Admin    - MEDICATION INSTRUCTIONS for Dialysis  Patients -   Does not apply See Admin Instructions Love Gross, [facility]        acetylcysteine (MUCOMYST) 20 % nebulizer solution 4 mL  4 mL Nebulization Q4H Rod Nath MD   4 mL at 06/24/24 1203    amLODIPine (NORVASC) tablet 10 mg  10 mg Oral Daily Devin Ravi MD        clopidogrel (PLAVIX) tablet 75 mg  75 mg Oral Daily Kiana Joseph MD   75 mg at 06/24/24 0935    famotidine (PEPCID) tablet 10 mg  10 mg Oral Daily Devin Ravi MD   10 mg at 06/24/24 0935    fluticasone-vilanterol (BREO ELLIPTA) 200-25 MCG/ACT inhaler 1 puff  1 puff Inhalation Daily Bruce Ulloa MD   1 puff at 06/24/24 0943    sodium chloride 0.9% DIALYSIS Cath LOCK - BLUE Lumen  1.3-2.6 mL Intracatheter Once Leonor Salomon DO        Followed by    heparin 1000 unit/mL DIALYSIS Cath LOCK - BLUE Lumen  3 mL Intracatheter Once Leonor Salomon DO        sodium chloride 0.9% DIALYSIS Cath LOCK - RED Lumen  10 mL Intracatheter Once in dialysis/CRRT Hardy Falcon MD        Followed by    heparin 1000 unit/mL DIALYSIS Cath LOCK - RED Lumen  1.3-2.6 mL Intracatheter Once in dialysis/CRRT Hardy Falcon MD        sodium chloride 0.9% DIALYSIS Cath LOCK - RED Lumen  1.3-2.6 mL Intracatheter Once Leonor Salomon DO        Followed by    heparin 1000 unit/mL DIALYSIS Cath LOCK - RED Lumen  3 mL Intracatheter Once Leonor Salomon DO        sodium chloride 0.9% DIALYSIS Cath LOCK - BLUE Lumen  10 mL Intracatheter Once in dialysis/CRRT Hardy Falcon MD        Followed by    heparin 1000 unit/mL DIALYSIS Cath LOCK -BLUE Lumen  1.3-2.6 mL Intracatheter Once in dialysis/CRRT Hardy Falcon MD        HYDROmorphone (PF) (DILAUDID) injection 0.3 mg  0.3 mg Intravenous Once Devin Ravi MD        ipratropium - albuterol 0.5 mg/2.5 mg/3 mL (DUONEB) neb solution 3 mL  3 mL Nebulization 4x daily Rod Nath MD   3 mL at 06/24/24 1203    nicotine (NICODERM CQ) 14  MG/24HR 24 hr patch 1 patch  1 patch Transdermal Daily Bruce Ulloa MD   1 patch at 06/24/24 0849    No heparin via hemodialysis machine   Does not apply Once Hardy Falcon MD        polyethylene glycol (MIRALAX) Packet 17 g  17 g Oral Daily Bruce Ulloa MD        [Held by provider] rosuvastatin (CRESTOR) tablet 10 mg  10 mg Oral At Bedtime Bruce Ulloa MD   10 mg at 06/22/24 2159    saccharomyces boulardii (FLORASTOR) capsule 250 mg  250 mg Oral BID Bruce Ulloa MD   250 mg at 06/24/24 0935    sodium chloride (PF) 0.9% PF flush 3 mL  3 mL Intracatheter Q8H Bruce Ulloa MD   3 mL at 06/24/24 0936    sodium chloride (PF) 0.9% PF flush 9 mL  9 mL Intracatheter During Dialysis/CRRT (from stock) Hardy Falcon MD        sodium chloride (PF) 0.9% PF flush 9 mL  9 mL Intracatheter During Dialysis/CRRT (from stock) Hardy Falcon MD        sodium chloride 0.9% BOLUS 250 mL  250 mL Intravenous Once in dialysis/CRRT Hardy Falcon MD        sodium chloride 0.9% BOLUS 300 mL  300 mL Hemodialysis Machine Once Hardy Falcon MD                      Physical Exam:   Vitals were reviewed  Vital Signs with Ranges  Temp:  [97.4  F (36.3  C)-100  F (37.8  C)] 99.1  F (37.3  C)  Pulse:  [55-70] 57  Resp:  [10-28] 11  BP: (120-161)/(54-93) 147/73  FiO2 (%):  [70 %-100 %] 100 %  SpO2:  [82 %-100 %] 97 %  I/O last 3 completed shifts:  In: 1120 [P.O.:450; Other:350]  Out: 4920 [Other:3000; Chest Tube:1920]    Constitutional: Alert, oriented, cooperative, lying in bed in NAD.  HEENT: Eyes nonicteric, oral mucosa moist  Cardiovascular: RRR, normal S1/2, no m/r/g  Respiratory: No breath sounds on left, Right relatively clear no wheezing or crackles  GI: Normoactive bowel sounds, nontender  Skin/Integumen: No rashes  Neuro/Psych: Appropriate affect and mood. A&Ox3, moves all extremities             Data:   I reviewed the patient's new clinical lab test results.   Recent Labs   Lab Test 06/24/24  0633 06/23/24  1646 06/23/24  4914  06/23/24  0849 06/23/24  0609 04/13/24  0655 04/11/24  0643 10/07/23  1704 10/07/23  0516 10/06/23  1928 10/06/23  0551   WBC 5.6  --   --  5.5 6.1   < > 5.0   < > 7.7   < > 9.3   HGB 9.5*  9.5* 10.3* 9.7* 6.4* 6.4*   < > 8.4*   < > 10.0*   < > 10.8*   MCV 95  --   --  99 98   < > 86   < > 87   < > 87     --   --  199 210   < > 151   < > 120*   < > 177   INR  --   --   --   --   --   --  1.31*  --  1.38*  --  1.08    < > = values in this interval not displayed.     Recent Labs   Lab Test 06/24/24  0633 06/23/24  0609 06/22/24  2218 06/22/24  1513 06/22/24  0541   POTASSIUM 3.8 5.3 5.3   < > 7.1*   CHLORIDE 97* 100  --   --  99   CO2 25 19*  --   --  16*   BUN 50.8* 90.3*  --   --  90.1*   ANIONGAP 11 14  --   --  13    < > = values in this interval not displayed.     Recent Labs   Lab Test 06/23/24  0609 06/21/24  1449 05/29/24  0732 05/17/24  1137 05/16/24  0832 05/07/24  0553 05/03/24  1319 12/09/22  1444 08/10/21  0833 06/11/20  0810 06/10/20  1243 09/18/19  0739 02/04/19  0935 02/02/19  0615 02/01/19  1118 02/01/19  0845 07/30/18  0923 08/14/17  1028   ALBUMIN 2.6* 2.4* 2.0*   < > 1.9*   < >  --    < > 3.6   < > 3.6   < >  --    < >  --  4.3   < > 3.8   BILITOTAL <0.2 <0.2  --   --  0.2   < >  --    < > 0.5   < > 0.5   < >  --    < >  --  0.6   < > 0.3   ALT 18 28  --   --  9   < >  --    < > 24   < > 30   < >  --    < >  --  75*   < > 37   AST 15 27  --   --  13   < >  --    < > 19   < > 21   < >  --    < >  --  39   < > 22   PROTEIN  --   --   --   --   --   --  200*  --   --   --   --   --  Negative  --  Negative  --   --   --    LIPASE  --   --   --   --   --   --   --   --  132  --  63*  --   --   --   --  101  --  135   AMYLASE  --   --   --   --   --   --   --   --   --   --   --   --   --   --   --   --   --  46    < > = values in this interval not displayed.       I reviewed the patient's new imaging results.    All laboratory data reviewed  All imaging studies reviewed by me.    Jordan Dozier  DARREL Fuller,  6/24/2024  [facility]  Office : [phone]  Cell: [phone] (Dr. Layne)

## 2024-06-24 NOTE — PLAN OF CARE
A/Ox4. VSS ex HTN at times. Started shift on 9-11lpm oxymask. This morning desating to mid 80's and unable to get above 90%. Placed on HFNC. Currently at 45lpm and 100%fi02. LS diminished in bases. Murmur noted. +bs, +flatus, -bm. On HD. Possible dialysis today. Not voiding. Managing pain with prn dilaudid. Weight shifting in bed. Assist 1-2. Small emesis this morning. Zofran given with good result. On 1800ml fluid restriction. Poor appetite. +2 generalized edema, left foot with +3 edema. PICC in place and saline locked. Right chest CVC dressing CDI. Chest to suction, patent with serous output.

## 2024-06-24 NOTE — CONSULTS
[facility]    Pulmonology Consultation     Date of Admission:  6/21/2024  Date of Consult (When I saw the patient): 06/24/24    Assessment & Plan   Shirley Hendricks is a 63 year old female who was admitted on 6/21/2024. I was asked to see the patient for left pleural effusion, left mainstem bronchus occlusion.    #Acute hypoxic respiratory failure  This is likely secondary to the combination of underlying volume overload with bilateral pleural effusions, COPD and left mainstem obstruction leading to left lower lobe atelectasis.  # Left greater than right pleural effusion.  S/p thoracentesis and chest tube placement on the left side.  Fluid transudative by lights criteria.  Now with left-sided hydropneumothorax.  In the setting of anuria, LIMA as well as HFpEF.  # Left lower lobe collapse with left mainstem bronchus occlusion.  The most likely cause would be mucous plugging.  Patient states that since her AKA and with subsequent complications, she has been mostly bedbound and is not been able to expectorate well.  Patient also has significant smoking history and is at risk for lung cancer as well as an endobronchial lesion as well.  However, she has a CT chest earlier in the year which did not show any pulmonary nodules or lesions around the left lower lobe suspicious of neoplasm.  Hence, possible but less likely.  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  Currently on HD.    Being followed by nephrology.  # Hyperkalemia, hyponatremia, metabolic acidosis.  # HFpEF, volume overload.  # Acute on chronic anemia.  # HTN, HL, CAD.  # History of PVD, with recent AKA on the right with wound debridement subsequently.  # COPD.  # Tobacco use.    -Continue to wean her supplemental oxygen with goal O2 sats of above 88%.  - As for her pleural effusion, fluid appears to be transudative on both the pleural fluid studies by lights criteria.  - Eventual management for this would be volume management which is currently  by hemodialysis.  Patient has so far been able to tolerate fluid removal by HD pretty well.  - For the chest tube management, we will change it to waterseal currently as she did not have any air leak.  If any worsening hypoxia or symptoms, would change it back to suction.  - For underlying left mainstem obstruction, patient is currently not a candidate for bronchoscopy due to significantly high oxygen requirements which would lead to intubation and with underlying condition not improving, susceptible for recurrence of the mucous plugging.  - Will initiate pulmonary hygiene measures including Mucomyst nebulizers, chest PT, incentive spirometer and flutter valve.  - Follow-up with repeat chest x-rays to document improvement of pleural effusions and mucous plugging.  - If patient's supplemental oxygen needs improve with persistent atelectasis, bronchoscopy can be considered at that time.    -Patient is also on Plavix which will need to be stopped if and when bronchoscopy is planned for consideration of bronchoscopy with biopsies.  - Currently, have low suspicion of infection.  But patient is at high risk of developing a pneumonia.  Continue to monitor closely for signs and symptoms of infection.  Low threshold to initiate antibiotics.  - Rest of the plan as per the primary team.    BURKE Gandhi   of Medicine  [facility]  Pulmonary, Allergy, Critical Care and Sleep Medicine  Pager - [phone]        Rod Nath MD, MD    Text page  Securely message with the Vocera Web Console (learn more here)    Code Status    Full Code    Reason for Consult   Reason for consult:     Primary Care Physician   Vasquez Benoit    Chief Complaint   Shortness of breath    History is obtained from the patient    History of Present Illness   Shirley Hendricks is a 63 year old female who presents with shortness of breath, initially admitted on 6/21/2024.    Recent hospitalization from 3/29 - 4/22  with severe peripheral arterial disease and was found to have ischemic right leg and underwent above-knee amputation on the right.  This was also complicated by retroperitoneal hematoma and another admission in May when she was admitted after wound needed debridement, during which time she had renal failure which required dialysis.  This time, admitted with worsening shortness of breath.  On admission, noted to have bilateral left greater than right pleural effusion.  Thoracentesis done s/p removal of 800 mL of clear yellow fluid which showed the fluid to be transudative by lights criteria.  CT chest was done around that time which showed complete collapse of the left upper and lower lobes with obstruction of the left mainstem bronchus.  Her symptoms initially improved after the thoracentesis.  However, had another episode of worsening hypoxia and was placed on-HFNC.  Echo showing new wall motion abnormalities.  With previous history of CAD, patient was deemed not to be a candidate for cardiac ischemic evaluation and recommended outpatient management.  Over the course of the hospitalization, had hyperkalemia and became anuric.  Started on dialysis.    Chest tube was placed for recurrence of the left-sided pleural effusion and a CT scan was repeated which did not show any pulmonary embolism, but showed possible mucous plugging in the distal left mainstem bronchus and extending into the lobar bronchi.  New pneumothorax as well as persistent left pleural effusion was noted as well.  Pulmonary has been consulted for possible bronchoscopy and further management of the left-sided lung collapse.    Past Medical History   I have reviewed this patient's medical history and updated it with pertinent information if needed.   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 clot    Post-menopausal     Reflux esophagitis 02/2004    EGD: esophagitis and medium HH    SBO (small bowel obstruction) (H) 08/10/2021    SVT (supraventricular tachycardia) (H24)     Thrombocytopenia (H24)     Uncomplicated asthma     Vitamin C deficiency 08/12/2018       Past Surgical History   I have reviewed this patient's surgical history and updated it with pertinent information if needed.  Past Surgical History:   Procedure Laterality Date    AMPUTATE LEG ABOVE KNEE N/A 4/1/2024    Procedure: AMPUTATION, ABOVE KNEE right leg with wound vac dressing, excision of anteriotibial bypass graft, closure of (previous interventional radiology) left groin incision;  Surgeon: José Luis Hernandez MD;  Location:  OR    AMPUTATE LEG ABOVE KNEE Right 4/9/2024    Procedure: COMPLETION RIGHT ABOVE KNEE AMPUTATION;  Surgeon: José Luis Hernandez MD;  Location:  OR    ANGIOGRAM      ANGIOGRAM Right 6/23/2021    Procedure: RIGHT LOWER EXTREMITY ANGIOGRAM WITH LEFT BRACHIAL ARTERY CUTDOWN;  Surgeon: José Luis Hernnadez MD;  Location:  OR    APPLY WOUND VAC Right 5/16/2024    Procedure: PLACEMENT OF WOUND VAC TO RIGHT ABOVE KNEE AMPUTATION;  Surgeon: Tay Enciso MD;  Location:  OR    APPLY WOUND VAC N/A 5/20/2024    Procedure: AND PLACEMENT OF WOUND VAC;  Surgeon: José Luis Hernandez,  MD;  Location:  OR    BIOPSY MASS NECK Right 10/11/2023    Procedure: Right Parotid Biopsy;  Surgeon: Randal Mendoza MD;  Location:  OR    BYPASS GRAFT FEMOROPOPLITEAL Right 09/23/2020    Procedure: RIGHT FEMORAL GRAFT TO ABOVE-KNEE POPLITEAL BYPASS USING CADAVERIC SUPERFICIAL FEMORAL ARTERY;  Surgeon: Chadwick Lozano MD;  Location:  OR    BYPASS GRAFT FEMOROPOPLITEAL Right 2/15/2022    Procedure: RIGHT SUPERFICIAL FEMORAL ARTERY GRAFT TO BELOW KNEE POPLITEAL BYPASS WITH CADAVERIC CRYOLIFE  FEMORAL-POPLITEAL ARTERY SIZE: OUTER DIAMETER: 7MM - 6MM;  Surgeon: Chadwick Lozano;  Location: [facility] OR    BYPASS GRAFT FEMOROPOPLITEAL Right 5/26/2023    Procedure: RIGHT DISTAL SUPERFICIAL FEMORAL GRAFT TO ANTERIOR TIBIAL ARTERY WITH 26 CENTIMETER CADAVERIC SUPERFICIAL FEMORAL ARTERY GRAFT;  Surgeon: Chadwick Lozano MD;  Location:  OR    BYPASS GRAFT FEMOROPOPLITEAL Right 10/11/2023    Procedure: RIGHT FEMORAL TO POPLITEAL GRAFT THROMBECTOMY;  Surgeon: Chadwick Lozano MD;  Location:  OR    BYPASS GRAFT INSITU FEMOROPOPLITEAL Right 7/7/2021    Procedure: CREATION RIGHT FEMORAL TO POPLITEAL ARTERIAL BYPASS USING INSITU VEIN GRAFT;  Surgeon: José Luis Hernandez MD;  Location:  OR    CARDIAC CATHERIZATION  09/03/2009    multivessel CAD without target lesions, med mgmt indicated, preserved ef    CARPAL TUNNEL RELEASE RT/LT Right 05/20/2021    COLONOSCOPY N/A 12/12/2023    Procedure: Colonoscopy;  Surgeon: Corey Hoffman MD;  Location:  GI    COLONOSCOPY N/A 12/14/2023    Procedure: Colonoscopy;  Surgeon: Corey Hoffman MD;  Location:  GI    CV CORONARY ANGIOGRAM N/A 10/4/2023    Procedure: Coronary Angiogram;  Surgeon: Rogelio Ricks MD;  Location:  HEART CARDIAC CATH LAB    CV PCI N/A 10/4/2023    Procedure: Percutaneous Coronary Intervention;  Surgeon: Rogelio Ricks MD;  Location:  HEART CARDIAC CATH LAB    EMBOLECTOMY LOWER EXTREMITY Right  10/6/2023    Procedure: Right anterior tibial bypass with graft, Right tibial endarterectomy,thrombectomy, Right doraslis pedis thrombectomy, Anterior Tibial vein patch.;  Surgeon: Chadwick Lozano MD;  Location:  OR    ENDARTERECTOMY CAROTID Right 05/11/2016    Procedure: ENDARTERECTOMY CAROTID;  Surgeon: Chadwick Lozano MD;  Location:  OR    ENDARTERECTOMY CAROTID Left 06/08/2020    Procedure: LEFT CAROTID ENDARTERECTOMY with distal facal vein patch  and EEG;  Surgeon: Chadwick Lozano MD;  Location:  OR    ESOPHAGOSCOPY, GASTROSCOPY, DUODENOSCOPY (EGD), COMBINED N/A 12/12/2023    Procedure: Esophagoscopy, gastroscopy, duodenoscopy (EGD), combined;  Surgeon: Corey Hoffman MD;  Location:  GI    FINE NEEDLE ASPIRATION WITHOUT IMAGING GUIDANCE Right 9/22/2023    Procedure: Fine needle aspiration without imaging guidance;  Surgeon: Kiersten Aguilera MD;  Location:  GI    FLUORESCENCE INTRAOPERATIVE VASCULAR ANGIOGRAPHY Right 12/28/2022    Procedure: RIGHT LEG ANGIOGRAM with intervention;  Surgeon: Chadwick Lozano MD;  Location:  OR    GYN SURGERY  left tube    left salpingectomy    IR ANGIOGRAM THROUGH CATHETER (ARTERIAL)  10/6/2023    IR ANGIOGRAM THROUGH CATHETER (ARTERIAL)  10/6/2023    IR ANGIOGRAM THROUGH CATHETER (ARTERIAL)  3/31/2024    IR ANGIOGRAM THROUGH CATHETER (ARTERIAL)  3/30/2024    IR CHEST TUBE PLACEMENT NON-TUNNELLED LEFT  6/23/2024    IR CVC TUNNEL PLACEMENT > 5 YRS OF AGE  4/3/2024    IR CVC TUNNEL PLACEMENT > 5 YRS OF AGE  6/23/2024    IR CVC TUNNEL REMOVAL RIGHT  5/28/2024    IR CVC TUNNEL REVISION RIGHT  4/15/2024    IR LOWER EXTREMITY ANGIOGRAM RIGHT  05/25/2021    IR LOWER EXTREMITY ANGIOGRAM RIGHT  10/5/2023    IR LOWER EXTREMITY ANGIOGRAM RIGHT  3/29/2024    IR OR ANGIOGRAM  6/23/2021    IR OR ANGIOGRAM  12/28/2022    IRRIGATION AND DEBRIDEMENT LOWER EXTREMITY, COMBINED Right 5/16/2024    Procedure: IRRIGATION AND DEBRIDEMENT OF RIGHT  ABOVE KNEE AMPUTATION;  Surgeon: Tay Enciso MD;  Location:  OR    IRRIGATION AND DEBRIDEMENT LOWER EXTREMITY, COMBINED Right 5/20/2024    Procedure: IRRIGATION AND DEBRIDMENT RIGHT LOWER EXTREMITY;  Surgeon: José Luis Hernandez MD;  Location:  OR    LAPAROSCOPIC CHOLECYSTECTOMY N/A 09/27/2017    Procedure: LAPAROSCOPIC CHOLECYSTECTOMY;  LAPAROSCOPIC CHOLECYSTECTOMY;  Surgeon: Jacoby Tapia MD;  Location:  SD    LAPAROSCOPY DIAGNOSTIC (GENERAL) N/A 8/11/2021    Procedure: Exploratory laparoscopy,  laparoscopic lysis of adhesions, laparotomy;  Surgeon: Corina Ferreira MD;  Location:  OR    OR ANGIOGRAM, LOWER EXTREMITY Right 10/11/2023    Procedure: Or Angiogram, Lower Extremity;  Surgeon: Chadwick Lozano MD;  Location:  OR    ORTHOPEDIC SURGERY      left knee surgery    REPAIR ANEURYSM FEMORAL ARTERY Left 12/28/2022    Procedure: REPAIR LEFT FEMORAL PSEUDOANEURYSM;  Surgeon: Chadwick Lozano MD;  Location:  OR    VASCULAR SURGERY  aoto bi fem  left fem-AK pop    [facility] FABRIC WRAPPING OF ABDOMINAL ANEURYSM      [facility] NONSPECIFIC PROCEDURE  12/2000    angioplasty with stent right fem. a.    [facility] NONSPECIFIC PROCEDURE  1987    sinus surgery    [facility] NONSPECIFIC PROCEDURE  09/02/2009    Emergent left groin exploration with oversewing of bleeding angiographic site. 2. Endarterectomy of common femoral-proximal superficial femoral artery with greater saphenous vein patch angioplasty.   a. [place] of accessory right greater saphenous vein.     [facility] NONSPECIFIC PROCEDURE  08/27/2009    occluded left common iliac and external iliac arteries were successfully revascularized with stenting to 8 and 7 mm        Prior to Admission Medications   Prior to Admission Medications   Prescriptions Last Dose Informant Patient Reported? Taking?   acetaminophen (TYLENOL) 500 MG tablet   No No   Sig: Take 1-2 tablets (500-1,000 mg) by mouth every 6 hours as needed for mild pain   amLODIPine (NORVASC)  10 MG tablet Past Week at ran out of at least 3 days ago  No Yes   Sig: Take 1 tablet (10 mg) by mouth daily   budesonide-formoterol (SYMBICORT) 160-4.5 MCG/ACT Inhaler New Rx at not picked up yet  No No   Sig: Inhale 2 puffs into the lungs two times daily Disp 3 inhalers   bumetanide (BUMEX) 2 MG tablet Rx not filled  No No   Sig: Take 1 tablet (2 mg) by mouth daily   carvedilol (COREG) 12.5 MG tablet 6/21/2024 at am  No Yes   Sig: Take 1 tablet (12.5 mg) by mouth 2 times daily (with meals)   cetirizine (ZYRTEC) 5 MG tablet Past Week  No Yes   Sig: Take 1 tablet (5 mg) by mouth daily   clopidogrel (PLAVIX) 75 MG tablet 6/21/2024 at am  No Yes   Sig: Take 1 tablet (75 mg) by mouth daily   diphenoxylate-atropine (LOMOTIL) 2.5-0.025 MG tablet   No Yes   Sig: Take 1 tablet by mouth 4 times daily as needed for diarrhea   famotidine (PEPCID) 20 MG tablet Rx not filled  No No   Sig: Take 1 tablet (20 mg) by mouth 2 times daily   gabapentin (NEURONTIN) 100 MG capsule 6/20/2024 at pm  No Yes   Sig: Take 2 capsules (200 mg) by mouth at bedtime   hydrALAZINE (APRESOLINE) 10 MG tablet Don't think Rx filled  No No   Sig: Take 1 tablet (10 mg) by mouth 4 times daily   miconazole (MICATIN) 2 % external powder   No No   Sig: Apply topically 2 times daily   nicotine (NICODERM CQ) 14 MG/24HR 24 hr patch 6/20/2024  No Yes   Sig: Place 1 patch onto the skin daily   nicotine (NICODERM CQ) 14 MG/24HR 24 hr patch   No No   Sig: Place 1 patch onto the skin every 24 hours   nitroGLYcerin (NITROSTAT) 0.4 MG sublingual tablet  Self No No   Sig: Place 1 tablet (0.4 mg) under the tongue See Admin Instructions for chest pain   ondansetron (ZOFRAN ODT) 4 MG ODT tab   No No   Sig: Take 1 tablet (4 mg) by mouth every 6 hours as needed for nausea or vomiting   oxyCODONE (ROXICODONE) 5 MG tablet 6/21/2024  No Yes   Sig: Take 1 tablet (5 mg) by mouth 2 times daily as needed for moderate pain   polyethylene glycol (MIRALAX) 17 GM/Dose powder   No No    Sig: Take 17 g by mouth daily   rosuvastatin (CRESTOR) 10 MG tablet 6/20/2024  No Yes   Sig: Take 1 tablet (10 mg) by mouth at bedtime   saccharomyces boulardii (FLORASTOR) 250 MG capsule Unsure  No No   Sig: Take 1 capsule (250 mg) by mouth 2 times daily   silver sulfADIAZINE (SILVADENE) 1 % external cream   No No   Sig: Apply topically daily   wound support modular (EXPEDITE) LIQD bottle   No No   Sig: Take 60 mLs by mouth daily      Facility-Administered Medications: None     Allergies   Allergies   Allergen Reactions    Contrast Dye Anaphylaxis     Pt reported facial and throat swelling with prior CT contrast    Pantoprazole      Protonix caused diffuse edema    Chantix [Varenicline]      Terrible dreams    Penicillins Itching and Rash       Social History   I have reviewed this patient's social history and updated it with pertinent information if needed. Shirley Hendricks  reports that she has quit smoking. Her smoking use included cigarettes. She started smoking about 56 years ago. She has a 14.1 pack-year smoking history. She has never used smokeless tobacco. She reports that she does not currently use alcohol. She reports current drug use. Drug: Marijuana.    Family History   I have reviewed this patient's family history and updated it with pertinent information if needed.   Family History   Problem Relation Age of Onset    Cancer Mother         bladder    Cardiovascular Father         alive,multiple heart attacks    Diabetes Maternal Grandmother     Lung Cancer Maternal Grandmother     Blood Disease Brother         clotting disorder       Review of Systems   The 10 point Review of Systems is negative other than noted in the HPI or here.     Physical Exam   Temp: 99.1  F (37.3  C) Temp src: Axillary BP: (!) 153/69 Pulse: 59   Resp: 16 SpO2: 98 % O2 Device: Oxymask Oxygen Delivery: 7 LPM  Vital Signs with Ranges  Temp:  [98  F (36.7  C)-100  F (37.8  C)] 99.1  F (37.3  C)  Pulse:  [53-70] 59  Resp:   "[10-19] 16  BP: (120-159)/(54-86) 153/69  FiO2 (%):  [70 %-100 %] 100 %  SpO2:  [82 %-100 %] 98 %  128 lbs 8.45 oz    Constitutional: Awake, alert, cooperative, no apparent distress, and appears stated age.  Eyes: Lids and lashes normal, pupils equal, round and reactive to light, extra ocular muscles intact, sclera clear, conjunctiva normal.  ENT: Normocephalic, without obvious abnormality, atraumatic, sinuses nontender on palpation, external ears without lesions, oral pharynx with moist mucus membranes, tonsils without erythema or exudates, gums normal and good dentition.  Respiratory: No increased work of breathing, good air exchange, clear to auscultation bilaterally, no crackles or wheezing.  Cardiovascular: Normal apical impulse, regular rate and rhythm, normal S1 and S2, no S3 or S4, and no murmur noted.  GI: No scars, normal bowel sounds, soft, non-distended, non-tender, no masses palpated, no hepatosplenomegaly.  Skin: No bruising or bleeding, normal skin color, texture, turgor, no redness, warmth, or swelling, no rashes, no lesions, no abnormal moles, nails normal without discoloration or clubbing and no jaundice.  Musculoskeletal: There is no redness, warmth, or swelling of the joints.  Full range of motion noted.  Motor strength is 5 out of 5 all extremities bilaterally.  Tone is normal.  Neurologic: Awake, alert, oriented to name, place and time.  Cranial nerves II-XII are grossly intact.  Motor is 5 out of 5 bilaterally.  Cerebellar finger to nose, heel to shin intact.  Sensory is intact.  Babinski down going, Romberg negative, and gait is normal.   Neuropsychiatric: Calm, normal eye contact, alert, normal affect, oriented to self, place, time and situation, memory for past and recent events intact and thought process normal.       No data to display                All cultures:  No results for input(s): \"CULT\" in the last 168 hours.    Imaging:            Data   Results for orders placed or performed " during the hospital encounter of 24 (from the past 24 hour(s))   Quantiferon-TB Gold Plus    Specimen: Peripheral Blood    Narrative    The following orders were created for panel order Quantiferon-TB Gold Plus.  Procedure                               Abnormality         Status                     ---------                               -----------         ------                     Quantiferon TB Gold Plus...[[id number]]                      In process                 Quantiferon TB Gold Plus...[[id number]]                      In process                 Quantiferon TB Gold Plus...[[id number]]                      In process                 Quantiferon TB Gold Plus...[[id number]]                      In process                   Please view results for these tests on the individual orders.   Basic metabolic panel   Result Value Ref Range    Sodium 133 (L) 135 - 145 mmol/L    Potassium 3.8 3.4 - 5.3 mmol/L    Chloride 97 (L) 98 - 107 mmol/L    Carbon Dioxide (CO2) 25 22 - 29 mmol/L    Anion Gap 11 7 - 15 mmol/L    Urea Nitrogen 50.8 (H) 8.0 - 23.0 mg/dL    Creatinine 2.84 (H) 0.51 - 0.95 mg/dL    GFR Estimate 18 (L) >60 mL/min/1.73m2    Calcium 7.3 (L) 8.8 - 10.2 mg/dL    Glucose 88 70 - 99 mg/dL   Hemoglobin   Result Value Ref Range    Hemoglobin 9.5 (L) 11.7 - 15.7 g/dL   CBC with platelets   Result Value Ref Range    WBC Count 5.6 4.0 - 11.0 10e3/uL    RBC Count 3.06 (L) 3.80 - 5.20 10e6/uL    Hemoglobin 9.5 (L) 11.7 - 15.7 g/dL    Hematocrit 29.2 (L) 35.0 - 47.0 %    MCV 95 78 - 100 fL    MCH 31.0 26.5 - 33.0 pg    MCHC 32.5 31.5 - 36.5 g/dL    RDW 15.7 (H) 10.0 - 15.0 %    Platelet Count 201 150 - 450 10e3/uL   Echocardiogram Limited   Result Value Ref Range    LVEF  60%     Narrative    [id number]  [id number]  [id number]  [id number]^LITA^EMIR^VLADIMIR     [facility]  Echocardiography Laboratory  [address] [place] [address]     Name: WANKASIA  MRN: [id number]  :  1960  Study Date: 2024 07:56 AM  Age: 63 yrs  Gender: Female  Patient Location: [facility]  Reason For Study: Cardiac Murmur  Ordering Physician: EMIR LONDON  Referring Physician: Vasquez Benoit MD  Performed By: Georgiana Castanon     BSA: 1.6 m2  Height: 61 in  Weight: 128 lb  HR: 67  BP: 159/76 mmHg  ______________________________________________________________________________  Procedure  Limited Portable Echo Adult. Optison (NDC #4500-6943) given intravenously.  ______________________________________________________________________________  Interpretation Summary     1. The left ventricle is normal in structure, function and size. The visual  ejection fraction is estimated at 60%. Mild mid anterior hypokinesis.  2. The right ventricle is normal in structure, function and size.  3. No valve disease.     Compared to echo from 24, the mid anterior hypokinesis is now more mild,  otherwise no changes.  ______________________________________________________________________________  Left Ventricle  The left ventricle is normal in structure, function and size. There is normal  left ventricular wall thickness. The visual ejection fraction is estimated at  60%. Left ventricular diastolic function is normal. Mild mid anterior  hypokinesis.     Right Ventricle  The right ventricle is normal in structure, function and size.     Mitral Valve  There is no mitral regurgitation noted.     Tricuspid Valve  There is mild (1+) tricuspid regurgitation.     Aortic Valve  The aortic valve is normal in structure and function.     Pericardium  There is no pericardial effusion.     Rhythm  Sinus rhythm was noted.  ______________________________________________________________________________  Doppler Measurements & Calculations  TR max ric: 301.6 cm/sec  TR max P.4 mmHg     ______________________________________________________________________________  Report approved by: Cheryl Chen 2024 12:04 PM          CT Chest w/o Contrast    Narrative    CT CHEST WITHOUT CONTRAST  6/24/2024 9:21 AM     HISTORY: Respiratory failure. Evaluate for left mainstem bronchus  obstruction or lesion. Rule out lung cancer. Potential  contraindications to iodinated contrast.    TECHNIQUE: CT scan of the chest was performed without IV contrast.  Multiplanar reformats were obtained. Dose reduction techniques were  used.  CONTRAST: None.    COMPARISON: 6/21/2024    FINDINGS:   LUNGS AND PLEURA: Persistent complete collapse of the left lung. There  is mucous material or soft tissue within the distal left mainstem  bronchus extending into the left upper and lower lobe bronchi. There  are a few air bronchograms in both lobes. A new left-sided chest tube  has been placed with decrease in now small left pleural effusion.  There is a small left pneumothorax. A small to moderate-sized right  pleural effusion has not appreciably changed. Mild right lower lobe  atelectasis. Mild groundglass opacity and smooth septal thickening has  developed in the right lung suggestive of pulmonary edema.    MEDIASTINUM/AXILLAE: New right IJ dialysis catheter in the right  atrium. New left PICC line tip in the SVC. Severe coronary artery  calcification. Stable left breast edema.    UPPER ABDOMEN: Negative.      Impression    IMPRESSION:  1.  Persistent collapse of the left lung. There is mucous material in  the distal left mainstem bronchus and extending into the lobar  bronchi.  2.  New left chest tube with decrease in size of left pleural effusion  and new small pneumothorax.  3.  Mild pulmonary edema has developed in the right lung.    RAGINI DEVINE MD         SYSTEM ID:  [id number]     *Note: Due to a large number of results and/or encounters for the requested time period, some results have not been displayed. A complete set of results can be found in Results Review.       I personally spent 80 minutes on the date of the encounter doing chart review, history  and exam, documentation and further activities per the note.       Pulmonary will continue to follow.       BURKE Gandhi   of Medicine  [facility]  Pulmonary, Allergy, Critical Care and Sleep Medicine  Pager - [phone]

## 2024-06-24 NOTE — PROGRESS NOTES
[facility]    Hospitalist Progress Note    Interval History   - Chest tube placed yesterday, so far 1L output, 150mL since 0600 this morning  - Dialysis started yesterday. Patient remains anuric today  - Continues to require 10L O2 satting 88%  - Low hgb yesterday likely spurious related to inaccurate PICC draw  - Discussed with sister over the phone, as well as patient. Likely etiology of patient's hospitalization is obstruction of her left mainstem bronchus, the cause of obstruction is not clear but includes malignancy. Patient will likely need a bronchoscopy, will need to be done here vs [facility], defer to Pulmonology. Addendum: Discussed with Pulm, they suspect mucous plugging as most likely source of obstruction, less likely malignant as patient had no evidence of malignancy on CT scan a few months ago. They recommend conservative management with volume management, airway secretion management. Bronchoscopy risk too high currently. Continue chest tube today.  - Discussed with nurse as well    Assessment & Plan   Summary: Shirley Hendricks is a 63 year old female with PMH CAD, MI, peripheral vascular disease status angioplasty and right AKA with wound vac, DLE, COPD, CMT disease, tobacco use, CKD stage III, GERD, hypertension, history of B-cell lymphoma, hyperlipidemia, depression, anxiety, who was admitted on 6/21/2024 with acute hypoxic respiratory failure due to a large left pleural effusion, hyperkalemia, and hyponatremia.   Patient recently admitted to [facility] 3/29-4/22/24 with right limb ischemia in setting of severe PAD ultimately requiring right AKA with complicated post op course (including LIMA requiring HD) after which she was discharged to  ARU. She was readmitted to [facility] 5/15-5/29/24 with stump eschars requiring surgical debridement and wound VAC placement.   This admission, patient underwent left thoracentesis with 800mL and 1200mL fluid removed. Severe  hyperkalemia improved with Lokelma. Patient has been anuric since admission, started on dialysis on 6/23. Chest tube placed 6/23, CT on 6/24 shows limited lung expansion concerning for left mainstem obstruction.    Suspect left mainstem bronchial obstruction  Acute hypoxic respiratory failure due to above  Large left pleural effusion, transudative  S/p left thoracentesis 800mL 6/21, 1200mL 6/22  S/p left chest tube 6/23/2024  Right moderate pleural effusion  Patient with increasing shortness of breath for 1-2 weeks PTA. CXR 6/21 in ED reveals complete opacification of the left hemithorax. CT chest-large left pleural effusion with complete collapse of the left upper and left lower and occlusion of left mainstem bronchus and a moderate size right pleural effusion. Patient underwent thoracentesis 800mL on 6/21, fluid appears transudative. Patient appears markedly volume overloaded. In the ED she initially required 2L O2 but was transitioned to HFNC overnight 6/22 to 6/23 during RRT.  Reports symptomatic improvement with thoracentesis 6/21 and 6/22, but continues to require 10+L O2 or HFNC on 6/23. Discussed with Pulm on 6/23, concern remains left mainstem bronchus obstruction but need to rule out recurrent pleural effusion as cause, so chest tube placed on 6/23. CT chest on 6/24 (my read) shows minimal left lung expansion concerning for persistent left bronchial obstruction.  - Pleural fluid cytology pending  - Pulmonology consulted, likely to see Monday 6/24  - Continue [facility]  - CT chest 6/24 done, official read pending    Anuric renal failure on CKD  Recent acute renal failure in May 2024 needing hemodialysis  Severe hyperkalemia, improved  Hyponatremia  Metabolic acidosis  Recent baseline creatinine has been fluctuating between 1.9-2.8. On admission Creatinine is 3.07, potassium is 7.5, sodium of 128 with bicarb of 17.   In the ER patient did receive albuterol, 2 g calcium gluconate, insulin, Kayexalate 10 g and 40 IV  Lasix and 1 L IV fluid bolus. EKG showed sinus bradycardia with prolonged QT. Potassium has been elevated to 7.1 since admission, improved to 5.3 on 6/23 with Lokelma.  Patient has been anuric since admission, no urine output. Etiology of anuric renal failure likely related to left mainstem bronchus obstruction. Due to ongoing anuria, TDC placed 6/23 and started on dialysis.  No UOP on 6/24  - Appreciate Nephrology consult  - Dialysis    Possible acute diastolic congestive heart failure exacerbation  Elevated troponin suspect due to volume overload  Patient presents with large left pleural effusion, BNP  elevated to 11.9k. Note history of stress cardiomyopathy in Oct 2023 with EF 30-35% which then resolved on echo Nov 2023.    Patient does not have any significant chest discomfort and troponin are 174 and 177 and trending down to 156. Echo 6/22 shows Ef 60-65%, mid anterior and lateral severe hypokinessis, distal inferior hypokinesis, normal RV function. Suspect large component of renal failure contributing.  - Cardiology consult appreciated  - Diuresis per Nephrology, see above  - Tele, weights, I&O    Murmur 6/23  Noted systolic murmur 6/23, not heard 6/22  - Check limited echo --done 6/24, pending    Anemia of chronic disease  Acute anemia 6/23 likely spurious  Recent baseline hemoglobin has been fluctuating and has been between 8.4-10.1.   Hemoglobin 8.4 on 6/22-->6.4 on 6/23. Patient denies blood loss. Bilirubin is not elevated makes hemolysis less likely. Patient was given 1u pRBC, recheck Hgb was 9.5 via skin draw. The Hgb of 6.4 likely was spurious related to an inaccurate PICC line draw, likely Hgb was 8.5-->9.5 with 1u pRBC.  - [facility] consult appreciated  - Daily Hgb checks    Hypertension  Hyperlipidemia  History of coronary disease with an MI in 2019  Left heart cath on 10/4/2023 showed-completely occluded D1 with left to left collaterals from distal LAD to D1, moderate CAD involving proximal to mid  RCA not significant by IFR and moderate coronary disease please involving distal small caliber RPL and distal circumflex artery. Last echo  showed EF of 55 to 60% with normal RV and moderate trileaflet aortic sclerosis  - Hold Coreg  - Resume amlodipine 10mg daily    History of peripheral vascular disease status above-knee amputation on the right with wound dressing status washout on   Neuropathic pain  Patient has followed with vascular surgery and has noticed during last hospitalization in May 2024 she underwent washout with wound VAC placement and was discharged on Plavix and Crestor. On exam the amputation stump does not look infected and has wound vac in place   - Continue with PTA Plavix 75 mg daily  - Continues with wound vac, vascular surgery consult appreciated  - Pain control with hydromorphone oral PRN (avoid oxycodone as it is renally cleared)    COPD not in exacerbation  - On exam has no wheezing and continue with as needed symbicort    GERD: Famotidine BID    History of B-cell lymphoma  - Follows with [place] [facility]  - Pending cytology from thoracentesis     Tobacco use  - Continue with nicotine patch    Ongoing stressors  Patient had a recent AKA in 2024 with complicated post op course 2024, and reports   2024.    Clinically Significant Risk Factors        # Hyperkalemia: Highest K = 7 mmol/L in last 2 days, will monitor as appropriate       # Hypoalbuminemia: Lowest albumin = 2.4 g/dL at 2024  2:49 PM, will monitor as appropriate     # Hypertension: Noted on problem list             #Precipitous drop in Hgb/Hct: Lowest Hgb this hospitalization: 6.4 g/dL. Will continue to monitor and treat/transfuse as appropriate.           # Financial/Environmental Concerns:            PT/OT: ordered  Diet: Fluid restriction 1800 ML FLUID  Combination Diet Gluten Free Diet; Renal Diet (dialysis), 2 gm K Diet; 2 gm NA Diet    DVT Prophylaxis: none  Alvarez Catheter: Not  present  Lines: PRESENT      PICC 06/22/24 Double Lumen Left Brachial vein medial access-Site Assessment: WDL  CVC Double Lumen Right Subclavian Tunneled-Site Assessment: WDL except;Ecchymotic    Cardiac Monitoring: ACTIVE order. Indication: renal failure  Code Status: Full Code    Medically Ready for Discharge: Anticipated in 5+ Days      Devin Ravi MD  Hospitalist Service  [facility]  Securely message with [employer] (more info)  Text page via [email or web address] Paging/Directory     - Total time spent on encounter is 55 minutes, which includes counseling, coordination of care, time spent discussing with family, and/or time spent discussing with consultants.    Data reviewed today: I reviewed all new labs and imaging results over the last 24 hours.    Physical Exam   Temp: 98.9  F (37.2  C) Temp src: Axillary BP: (!) 151/64 Pulse: 67   Resp: 14 SpO2: 92 % O2 Device: High Flow Nasal Cannula (HFNC) Oxygen Delivery: 45 LPM  Vitals:    06/24/24 0605   Weight: 58.3 kg (128 lb 8.5 oz)     Vital Signs with Ranges  Temp:  [97.4  F (36.3  C)-100  F (37.8  C)] 98.9  F (37.2  C)  Pulse:  [55-70] 67  Resp:  [6-29] 14  BP: (100-161)/(48-93) 151/64  FiO2 (%):  [70 %-100 %] 70 %  SpO2:  [82 %-100 %] 92 %  I/O last 3 completed shifts:  In: 1120 [P.O.:450; Other:350]  Out: 4920 [Other:3000; Chest Tube:1920]  O2 requirements: Yes HFNC    Constitutional: Female appears somewhat ill  HEENT: Eyes nonicteric, oral mucosa moist  Cardiovascular: RRR, normal S1/2, no m/r/g  Respiratory: No breath sounds on left, Right relatively clear no wheezing or crackles  Vascular: Anasarca with UE and LLE pitting edema, note R AKA with wound vac  GI: Normoactive bowel sounds, nontender  Skin/Integumen: No rashes  Neuro/Psych: Appropriate affect and mood. A&Ox3, moves all extremities    Medications   Current Facility-Administered Medications   Medication Dose Route Frequency Provider Last Rate Last Admin    No lozenges or gum should  be given while patient on BIPAP/AVAPS/AVAPS AE   Does not apply Continuous PRN Devin Ravi MD        Patient may continue current oral medications   Does not apply Continuous PRN Devin Ravi MD         Current Facility-Administered Medications   Medication Dose Route Frequency Provider Last Rate Last Admin    - MEDICATION INSTRUCTIONS for Dialysis Patients -   Does not apply See Admin Instructions [facility]        [Held by provider] amLODIPine (NORVASC) tablet 10 mg  10 mg Oral Daily Bruce Ulloa MD        clopidogrel (PLAVIX) tablet 75 mg  75 mg Oral Daily Kiana Joseph MD   75 mg at 06/24/24 0935    famotidine (PEPCID) tablet 10 mg  10 mg Oral Daily Devin Ravi MD   10 mg at 06/24/24 0935    fluticasone-vilanterol (BREO ELLIPTA) 200-25 MCG/ACT inhaler 1 puff  1 puff Inhalation Daily Bruce Ulloa MD   1 puff at 06/24/24 0943    sodium chloride 0.9% DIALYSIS Cath LOCK - BLUE Lumen  1.3-2.6 mL Intracatheter Once Leonor Salomon DO        Followed by    heparin 1000 unit/mL DIALYSIS Cath LOCK - BLUE Lumen  3 mL Intracatheter Once Leonor Salomon DO        sodium chloride 0.9% DIALYSIS Cath LOCK - RED Lumen  1.3-2.6 mL Intracatheter Once Leonor Salomon DO        Followed by    heparin 1000 unit/mL DIALYSIS Cath LOCK - RED Lumen  3 mL Intracatheter Once Leonor Salomon DO        HYDROmorphone (PF) (DILAUDID) injection 0.3 mg  0.3 mg Intravenous Once Devin Ravi MD        nicotine (NICODERM CQ) 14 MG/24HR 24 hr patch 1 patch  1 patch Transdermal Daily Bruce Ulloa MD   1 patch at 06/24/24 0849    polyethylene glycol (MIRALAX) Packet 17 g  17 g Oral Daily Bruce Ulloa MD        [Held by provider] rosuvastatin (CRESTOR) tablet 10 mg  10 mg Oral At Bedtime Bruce Ulloa MD   10 mg at 06/22/24 0496    saccharomyces boulardii (FLORASTOR) capsule 250 mg  250 mg Oral BID Bruce Ulloa MD   250 mg at  06/24/24 0935    sodium chloride (PF) 0.9% PF flush 3 mL  3 mL Intracatheter Q8H Bruce Ulloa MD   3 mL at 06/24/24 0936       Data   Recent Labs   Lab 06/24/24  0633 06/23/24  1646 06/23/24  1549 06/23/24  0849 06/23/24  0609 06/22/24  2218 06/22/24  2146 06/22/24  1513 06/22/24  0541 06/21/24  1802 06/21/24  1749 06/21/24  1610 06/21/24  1449   WBC 5.6  --   --  5.5 6.1  --   --   --  9.3  --   --   --  4.7   HGB 9.5*  9.5* 10.3* 9.7* 6.4* 6.4*  --   --   --  8.9*   < >  --   --  8.4*   MCV 95  --   --  99 98  --   --   --  96  --   --   --  97     --   --  199 210  --   --   --  247  --   --   --  248   *  --   --   --  133*  --   --   --  128*  --  128*  --  129*   POTASSIUM 3.8  --   --   --  5.3 5.3  --    < > 7.1*   < > 6.4*   < > 6.8*   CHLORIDE 97*  --   --   --  100  --   --   --  99  --  99  --  99   CO2 25  --   --   --  19*  --   --   --  16*  --  17*  --  18*   BUN 50.8*  --   --   --  90.3*  --   --   --  90.1*  --  90.3*  --  87.4*   CR 2.84*  --   --   --  3.74*  --   --   --  3.16*  --  3.07*  --  3.01*   ANIONGAP 11  --   --   --  14  --   --   --  13  --  12  --  12   SONIA 7.3*  --   --   --  7.3*  --   --   --  7.8*  --  8.5*  --  7.7*   GLC 88  --   --   --  76  --  134*   < > 84   < > 114*   < > 116*   ALBUMIN  --   --   --   --  2.6*  --   --   --   --   --   --   --  2.4*   PROTTOTAL  --   --   --   --  4.5*  --   --   --   --   --  5.6*  --  5.2*   BILITOTAL  --   --   --   --  <0.2  --   --   --   --   --   --   --  <0.2   ALKPHOS  --   --   --   --  43  --   --   --   --   --   --   --  63   ALT  --   --   --   --  18  --   --   --   --   --   --   --  28   AST  --   --   --   --  15  --   --   --   --   --   --   --  27    < > = values in this interval not displayed.       Imaging:   Recent Results (from the past 24 hour(s))   IR CVC Tunnel Placement > 5 Yrs of Age    Narrative    IR CVC TUNNEL PLACEMENT > 5 YRS OF AGE   6/23/2024 12:57 PM     CLINICAL HISTORY/INDICATION:  end stage renal failure requiring  dialysis. Increased o2 demands, requiring dialysis    PROCEDURES PERFORMED: Tunneled catheter placement    MODERATE SEDATION: Versed 0.5 mg IV; Fentanyl 25 mcg IV. During the  time out, immediately prior to the administration of medications, the  patient was reassessed for adequacy to receive conscious sedation.   Under physician supervision, Versed and fentanyl were administered for  moderate sedation. Pulse oximetry, heart rate and blood pressure were  continuously monitored by an independent trained observer. The  physician spent 37 minutes of face-to-face sedation time with the  patient.    CONTRAST: None    FLUORO: 0.1 minutes    IMAGES/DOSE: 0.33 mGy    CONSENT: Following a discussion of the risks, benefits, indications  and alternatives to treatment, appropriate informed consent was  obtained.      TIMEOUT: A timeout was performed per universal protocol policy to  ensure correct patient, site, and procedure to be performed.     CENTRAL LINE STATEMENT: The patient was brought to the interventional  radiology suite and placed supine on the table. The patients right  neck and anterior chest region were prepped and draped in a sterile  fashion for tunneled line placement.  The procedure was performed  using maximal Sterile Barrier Technique Utilized: Cap AND mask AND  sterile gown AND sterile gloves AND sterile full body drape AND hand  hygiene AND skin preparation 2% chlorhexidine for cutaneous antisepsis  (or acceptable alternative antiseptics).  Sterile Ultrasound Technique  Utilized ?Sterile gel AND sterile probe covers.     PROCEDURE AND FINDINGS:   This central line was performed in accordance with the central line  bundle with the exception of vein selection. Following a discussion of  the risks, benefits, indications and alternatives to treatment,  appropriate informed consent was obtained.  The patient was brought to  the interventional radiology suite and placed supine  on the table. The  patients right neck and anterior chest region were prepped and draped  in a sterile fashion for tunneled line placement.  A timeout was  performed per universal protocol policy to ensure correct patient,  site, and procedure to be performed.      Ultrasound was utilized to evaluate the veins of the right neck and a  permanent record of the image was obtained which demonstrates the  internal jugular vein to be patent. Under direct ultrasound guidance,  1% Lidocaine was infiltrated and access was gained easily into the low  lateral aspect of the internal jugular vein utilizing micropuncture  technique. The wire was advanced easily under fluoroscopic guidance  and the tract was serially dilated and a peel away sheath placed. A  subcutaneous tunnel was then created over the anterior/superior chest  wall using local anesthesia and blunt dissection. Under fluoroscopic  guidance, a 14.5 Portuguese dual lumen 19 cm cuffed catheter was then  pulled through the tunnel and was advanced through the peel away  sheath. A final placement film demonstrates the catheter tip at the  cavoatrial junction with the patient supine.  All of the ports flush  and aspirate without difficulty following placement.  The catheter was  secured in position. The small incision at the base of the neck was  closed uneventfully.  A sterile dressing was applied.     Throughout the procedure, the patient was monitored by a radiology  nurse for cardiac rhythm which remained stable. The patient tolerated  the procedure well and left the interventional radiology suite in  stable condition.      Impression    IMPRESSION:  Uneventful placement of a right internal jugular vein 14.5 Portuguese dual  lumen 19 cm tunneled palindrome catheter, as described using  ultrasound and fluoroscopic guidance. This catheter is indicated to be  use for hemodialysis or pheresis.    GHASSAN COLLAZO DO         SYSTEM ID:  [id number]

## 2024-06-24 NOTE — PROGRESS NOTES
[id number]  Orientations: A&Ox4.   Vitals/Pain: VSS on 5 L NC, started shift on HFNC. Reports back pain, given prn dilaudid po x1.  Tele: SR  Lines/Drains: L CT to water seal per Pulm request. R dialysis catheter, scant bleeding at site- okay per dialysis RN. L PICC double lumen  Skin/Wounds: R AKA- WV removed per WOCN.  GI/: continent. - urine. - BM, passing gas. Reports multiple stools over the last two days- morning miralax held. Denies nausea and vomiting, toerating diet + FR  Labs: Abnormal/Trends, Electrolyte Replacement- n/a  Ambulation/Assist: x2 lift, x1 repos in bed  Plan: dialysis today- 4 L removed. CT to water seal per pulm request. Echo done. Chest CT done. Continue plan of care.

## 2024-06-24 NOTE — CONSULTS
"[facility]  WO Nurse Inpatient Assessment     Consulted for: Pressure Injury coccyx     Summary: patient with woc team consult for coccyx pressure injury, noted intact epidermis 6/24, woc will sign off of this area. Incidental finding, woc was following right AKA wound on previous admission. Stop wound vac 6/24 and switch to topical dressings due to healed to skin level at portions of the wound base     Patient History (according to provider note(s):      \"63 year old female with PMH CAD, MI, peripheral vascular disease status angioplasty and right AKA with wound vac, DLE, COPD, CMT disease, tobacco use, CKD stage III, GERD, hypertension, history of B-cell lymphoma, hyperlipidemia, depression, anxiety, who was admitted on 6/21/2024 with acute hypoxic respiratory failure due to a large left pleural effusion, hyperkalemia, and hyponatremia.                Patient recently admitted to [facility] 3/29-4/22/24 with right limb ischemia in setting of severe PAD ultimately requiring right AKA with complicated post op course (including LIMA requiring HD) after which she was discharged to  [facility]U. She was readmitted to [facility] 5/15-5/29/24 with stump eschars requiring surgical debridement and wound VAC placement.                This admission, patient underwent left thoracentesis with 800mL and 1200mL fluid removed. Severe hyperkalemia improved with Lokelma. Patient has been anuric since admission, started on dialysis on 6/23. Chest tube placed 6/23, CT on 6/24 shows limited lung expansion concerning for left mainstem obstruction.\"    Assessment:      Areas visualized during today's visit: Focused:, Sacrum/coccyx, and Lower extremities      location: buttock coccyx     Last photo: 6/24  Wound due to: Pressure Injury per historical charting. Now resurfaced with scar tissue and epidermis   Wound history/plan of care: sacral mepilex   Wound base:  epidermis     Palpation of the wound bed: firm and " "mild firmness        Drainage: none     Description of drainage: none     Measurements (length x width x depth, in cm): none     Tunneling: N/A     Undermining: N/A  Periwound skin: Intact      Color: normal and consistent with surrounding tissue      Temperature: normal   Odor: none  Pain: no grimacing or signs of discomfort, none  Pain interventions prior to dressing change: patient tolerated well  Treatment goal: Maintain (prevention of deterioration) and Protection  STATUS: initial assessment  Supplies ordered: supplies stored on unit and discussed with patient     Wound location: right AKA     Last photo: 6/24  Wound due to: Surgical Wound  Wound history/plan of care: 6/24  Wound base:  granulation tissue,  slough ~10%     Palpation of the wound bed: normal      Drainage: moderate     Description of drainage: serosanguinous     Measurements (length x width x depth, in cm): largest ~12  x 8  x  0.3 cm, not photographed distal stump with 1cm x 1cm x 0.4cm open area      Tunneling: N/A     Undermining: N/A  Periwound skin: Intact      Color: normal and consistent with surrounding tissue      Temperature: normal   Odor: none  Pain: moderate,  Pain interventions prior to dressing change: soaking, slow and gentle cares , and distraction  Treatment goal: Heal , Drainage control, Infection control/prevention, Remove necrotic tissue, and Soften necrotic tissue  STATUS: initial assessment  Supplies ordered: supplies stored on unit and discussed with patient        Treatment Plan:       Wound care  Start:  06/24/24 1404,   EVERY OTHER DAY,   Routine      Comments: Location: right stump  Care: provided every other day by primary RN  1. Cleanse every other day with wound cleanser and 4x4\" gauze, pat dry  2. Apply Criticiad paste to wound edges (no need to scrub off all white paste, reapply more)  3. Cover wound base with Aquacel Ag Advantage    4. Add ABD as needed for drainage  5. Secure with stockinette or ace wrap or " "EdemaWear          Skin care precautions  Start:  06/24/24 1404,   EFFECTIVE NOW,   Routine        Comments: Pressure Injury Prevention (PIP) Plan:  If patient is declining pressure injury prevention interventions: Explore reason why and address patient's concerns, Educate on pressure injury risk and prevention intervention(s), If patient is still declining, document \"informed refusal\" , and Ensure Care team is aware ( provider, charge nurse, etc)  Mattress: Follow bed algorithm, reassess daily and order specialty mattress, if indicated.  HOB: Maintain at or below 30 degrees, unless contraindicated  Repositioning in bed: Every 1-2 hours , Left/right positioning; avoid supine, and Raise foot of bed prior to raising head of bed, to reduce patient sliding down (shear)  Heels: Keep elevated off mattress and Pillows under calves  Protective Dressing: Sacral Mepilex for prevention (#520372),  especially for the agitated patient  Positioning Equipment:None  Chair positioning: Chair cushion (#564663) , Assist patient to reposition hourly, and Do NOT use a donut for sitting (this increases pressure to smaller area and creates a higher potential for injury)    If patient has a buttock pressure injury, or high risk for PI use chair cushion or SPS.  Moisture Management: Perineal cleansing /protection: Follow Incontinence Protocol, Avoid brief in bed, Clean and dry skin folds with bathing , Consider InterDry (#287942) between folds, and Moisturize dry skin  Under Devices: Inspect skin under all medical devices during skin inspection , Ensure tubes are stabilized without tension, and Ensure patient is not lying on medical devices or equipment when repositioned  Ask provider to discontinue device when no longer needed.          Orders: Written    RECOMMEND PRIMARY TEAM ORDER: None, at this time  Education provided: plan of care  Discussed plan of care with: Patient and Nurse  WOC nurse follow-up plan: weekly  Notify WOC if " wound(s) deteriorate.  Nursing to notify the Provider(s) and re-consult the [facility] Nurse if new skin concern.    DATA:     Current support surface: Standard  Standard Isoflex gel  Containment of urine/stool: Incontinence Protocol, Incontinent pad in bed, and Suction based external urinary catheter   BMI: Body mass index is 24.29 kg/m .   Active diet order: Orders Placed This Encounter      Combination Diet Renal Diet (dialysis); 2 gm NA Diet     Output: I/O last 3 completed shifts:  In: 1120 [P.O.:450; Other:350]  Out: 4920 [Other:3000; Chest Tube:1920]     Labs:   Recent Labs   Lab 06/24/24  0633 06/23/24  0849 06/23/24  0609 06/21/24  1449 06/21/24  0959   ALBUMIN  --   --  2.6*   < >  --    HGB 9.5*  9.5*   < > 6.4*   < > 8.6*   WBC 5.6   < > 6.1   < > 5.0   A1C  --   --   --   --  4.6    < > = values in this interval not displayed.     Pressure injury risk assessment:   Sensory Perception: 4-->no impairment  Moisture: 4-->rarely moist  Activity: 2-->chairfast  Mobility: 3-->slightly limited  Nutrition: 2-->probably inadequate  Friction and Shear: 2-->potential problem  Cesar Score: 17    Jayne CWOCN   1st choice: Securely message with [employer] ([facility] [employer])   (2nd option: [facility] Office Phone [phone], messages checked periodically Mon-Fri 8a-4p)

## 2024-06-24 NOTE — PROGRESS NOTES
[facility]     Renal Progress Note       SHORTHAND KEY FOR MY NOTES:  c = with, s = without, p = after, a = before, x = except, asx = asymptomatic, tx = transplant or treatment, sx = symptoms or symptomatic, cx = canceled or culture, rxn = reaction, yday = yesterday, nl = normal, abx = antibiotics, fxn = function, dx = diagnosis, dz = disease, m/h = melena/hematochezia, c/d/l/ha = cramping/dizziness/lightheadedness/headache, d/c = discharge or diarrhea/constipation, f/c/n/v = fevers/chills/nausea/vomiting, cp/sob = chest pain/shortness of breath, tbv = total body volume, rxn = reaction, tdc = tunneled dialysis catheter, pta = prior to admission, hd = hemodialysis, pd = peritoneal dialysis, hhd = home hemodialysis, edw = estimated dry wt         Assessment/Plan:     1.  LIMA/CKD IV c severe hyperkalemia.  Dialysis was initiated yday c good effect.  K and volume are better today, but she is still TBV up and will benefit from more UF today.  She is not urinating much since admission.  She didn't think she was getting diuretics since the last discharge, but reviewed multiple notes and it appears she was on bumet 2 mg every day and got it while in the TCU.  A.  HD today - 3.5h, 3-4L UF goal.  B.  Follow labs, uo, sx daily.  C.  Will resume diuretics on non-dialysis days.    2.  Hypoxia.  Pt's chest CT today showed persistent L lung collapse, small L PTX s/p chest tube placement, and some R pulm edema.  A.  Continue high-flow.  B.  Reassess p HD today.    3.  Hyponatremia. Multifactorial - lung issues could be causing SIADH + increased TBV.  A.  Follow Na.    4.  Anemia. Hb is stable p getting blood yday.  A.  Follow hb, clinically.    5.  FEN.  Ca corrects for low alb.  Other electrolytes are fine.  A.  Check labs in AM.    Case d/w Soumya Urena RN, pt/sister.        Interval History:     Pt needed to go back on HFNC today bc she was having trouble breathing.  Currently, she feels better.   Tolerated fluid removal on HD yday.  No c/d/l.  She is not urinating much since arriving at the hospital, but states she was at home.          Medications and Allergies:     Current Facility-Administered Medications   Medication Dose Route Frequency Provider Last Rate Last Admin    - MEDICATION INSTRUCTIONS for Dialysis Patients -   Does not apply See Admin Instructions Love Gross, [facility]        acetylcysteine (MUCOMYST) 20 % nebulizer solution 4 mL  4 mL Nebulization Q4H Rod Nath MD        amLODIPine (NORVASC) tablet 10 mg  10 mg Oral Daily Devin Ravi MD        clopidogrel (PLAVIX) tablet 75 mg  75 mg Oral Daily Kiana Joseph MD   75 mg at 06/24/24 0935    famotidine (PEPCID) tablet 10 mg  10 mg Oral Daily Devin Ravi MD   10 mg at 06/24/24 0935    fluticasone-vilanterol (BREO ELLIPTA) 200-25 MCG/ACT inhaler 1 puff  1 puff Inhalation Daily Bruce Ulloa MD   1 puff at 06/24/24 0943    sodium chloride 0.9% DIALYSIS Cath LOCK - BLUE Lumen  1.3-2.6 mL Intracatheter Once Leonor Salomon DO        Followed by    heparin 1000 unit/mL DIALYSIS Cath LOCK - BLUE Lumen  3 mL Intracatheter Once Leonor Salomon DO        sodium chloride 0.9% DIALYSIS Cath LOCK - RED Lumen  1.3-2.6 mL Intracatheter Once Leonor Salomon DO        Followed by    heparin 1000 unit/mL DIALYSIS Cath LOCK - RED Lumen  3 mL Intracatheter Once Leonor Salomon DO        HYDROmorphone (PF) (DILAUDID) injection 0.3 mg  0.3 mg Intravenous Once Devin Ravi MD        ipratropium - albuterol 0.5 mg/2.5 mg/3 mL (DUONEB) neb solution 3 mL  3 mL Nebulization 4x daily Rod Nath MD        nicotine (NICODERM CQ) 14 MG/24HR 24 hr patch 1 patch  1 patch Transdermal Daily Bruce Ulloa MD   1 patch at 06/24/24 0849    polyethylene glycol (MIRALAX) Packet 17 g  17 g Oral Daily Bruce Ulloa MD        [Held by provider] rosuvastatin (CRESTOR) tablet 10 mg  10  mg Oral At Bedtime Bruce Ulloa MD   10 mg at 06/22/24 2159    saccharomyces boulardii (FLORASTOR) capsule 250 mg  250 mg Oral BID Bruce Ulloa MD   250 mg at 06/24/24 0935    sodium chloride (PF) 0.9% PF flush 3 mL  3 mL Intracatheter Q8H Bruce Ulloa MD   3 mL at 06/24/24 0936     Allergies   Allergen Reactions    Contrast Dye Anaphylaxis     Pt reported facial and throat swelling with prior CT contrast    Pantoprazole      Protonix caused diffuse edema    Chantix [Varenicline]      Terrible dreams    Penicillins Itching and Rash          Physical Exam:     Vitals were reviewed     , Blood pressure (!) 146/63, pulse 64, temperature 98.9  F (37.2  C), temperature source Axillary, resp. rate 12, weight 58.3 kg (128 lb 8.5 oz), SpO2 95%, not currently breastfeeding.  Wt Readings from Last 3 Encounters:   06/24/24 58.3 kg (128 lb 8.5 oz)   06/05/24 60.3 kg (132 lb 15 oz)   05/29/24 62.3 kg (137 lb 5.6 oz)     Intake/Output Summary (Last 24 hours) at 6/24/2024 1128  Last data filed at 6/24/2024 0600  Gross per 24 hour   Intake 1060 ml   Output 4920 ml   Net -3860 ml     GENERAL APPEARANCE: pleasant, NAD, alert  HEENT:  + HFNC  RESP:  diminished, + crackles  CV: RRR, nl S1/S2   ABDOMEN: s/nt/nd  EXTREMITIES/SKIN: 2-3+ lle edema, R AKA  OTHER:  + wound VAC, + L chest tube  ACCESS:  temp RIJ TDC         Data:     CBC RESULTS:     Recent Labs   Lab 06/24/24  0633 06/23/24  1646 06/23/24  1549 06/23/24  0849 06/23/24  0609 06/22/24  0541 06/22/24  0220 06/21/24  1449 06/21/24  0959   WBC 5.6  --   --  5.5 6.1 9.3  --  4.7 5.0   RBC 3.06*  --   --  2.06* 2.14* 2.83*  --  2.72* 2.84*   HGB 9.5*  9.5* 10.3* 9.7* 6.4* 6.4* 8.9*   < > 8.4* 8.6*   HCT 29.2*  --   --  20.3* 20.9* 27.2*  --  26.5* 27.3*     --   --  199 210 247  --  248 265    < > = values in this interval not displayed.     Basic Metabolic Panel:  Recent Labs   Lab 06/24/24  0633 06/23/24  0609 06/22/24  2218 06/22/24  2146 06/22/24  1918  06/22/24  1748 06/22/24  1713 06/22/24  1513 06/22/24  0541 06/21/24  1802 06/21/24  1749 06/21/24  1610 06/21/24  1449 06/21/24  0959   * 133*  --   --   --   --   --   --  128*  --  128*  --  129* 129*   POTASSIUM 3.8 5.3 5.3  --   --  5.7*  --  7.0* 7.1*   < > 6.4*   < > 6.8* 7.0*   CHLORIDE 97* 100  --   --   --   --   --   --  99  --  99  --  99 101   CO2 25 19*  --   --   --   --   --   --  16*  --  17*  --  18* 17*   BUN 50.8* 90.3*  --   --   --   --   --   --  90.1*  --  90.3*  --  87.4* 90.2*   CR 2.84* 3.74*  --   --   --   --   --   --  3.16*  --  3.07*  --  3.01* 3.06*   GLC 88 76  --  134* 145*  --  75  --  84   < > 114*   < > 116* 97   SONIA 7.3* 7.3*  --   --   --   --   --   --  7.8*  --  8.5*  --  7.7* 8.1*    < > = values in this interval not displayed.     INRNo lab results found in last 7 days.   Attestation:   I have reviewed today's relevant vital signs, notes, medications, labs and imaging.    Hardy Falcon MD  [facility] - Nephrology  [phone]

## 2024-06-24 NOTE — CONSULTS
[facility]  Gastroenterology Consultation         Shirley Hendricks  [id number]  [facility] [id number]  63 year old female    Admission Date/Time: 6/21/2024  Primary Care Provider: Vasquez Benoit  Referring / Attending Physician:  Dr. Ravi    We were asked to see the patient in consultation by Dr. Ravi for evaluation of acute on chronic anemia.      CC: Respiratory failure, pulmonary fluid on imaging    HPI:  Shirley Hendricks is a 63 year old female who has complex medical history which includes GERD, hypertension, history of B-cell lymphoma, hyperlipidemia, depression, anxiety, MI, peripheral vascular disease status angioplasty, DLE, COPD, CMT disease, tobacco use, CKD stage III with baseline creatinine around 1.9-2.8, who presented to the ER from her clinic as she was told that she has a lot of fluid around her lungs.     Patient has had recent hospitalization from 3/29-4/22 when she was admitted for right ischemic leg, severe peripheral arterial disease and underwent above-knee amputation on the right and her course was complicated by development of sepsis, retroperitoneal hematoma     Readmitted to [facility] from 5/15-5/29 where she was admitted after she had the same of the wound of the AKA and necrosis that needed debridement and underwent debridement on 5/16 and also during that hospital stay patient had renal failure which needed dialysis which was stopped and she was told to take Bumex 2 mg daily with close follow-up to nephrology        Lab work in the ED showed Sodium of 128, potassium initially was 7.5 and repeat is trending down to 6.4, chloride of 99, bicarb of 19, BUN of 19.3, creatinine of 3.07.  proBNP of 11,959, initial high sensitive troponin of 174 and repeat of 177.  WBC count of 4.7, hemoglobin of 8.4, platelet count of 248     Chest x-ray was done which shows complete opacification of the left hemothorax which may be due to large pleural  effusion or complete atelectasis and small right pleural effusion     CT chest shows-large left pleural effusion with complete collapse of the left upper and left lower lobe and occlusion of the left mainstem bronchus, moderate sized right pleural effusion and subsegmental atelectasis of the right lower lobe and diffuse subcutaneous edema and severe atherosclerotic disease.    We were consulted to address her acute on chronic anemia.    ROS: A comprehensive ten point review of systems was negative aside from those in mentioned in the HPI.      PAST MED HX:  I have reviewed this patient's medical history and updated it with pertinent information if needed.   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 clot    Post-menopausal     Reflux esophagitis 02/2004    EGD: esophagitis and medium HH    SBO (small bowel obstruction) (H) 08/10/2021    SVT (supraventricular tachycardia) (H24)     Thrombocytopenia (H24)     Uncomplicated asthma     Vitamin C deficiency 08/12/2018       MEDICATIONS:   Prior to Admission Medications   Prescriptions Last Dose Informant Patient Reported? Taking?   acetaminophen (TYLENOL) 500 MG tablet   No No   Sig: Take 1-2  tablets (500-1,000 mg) by mouth every 6 hours as needed for mild pain   amLODIPine (NORVASC) 10 MG tablet Past Week at ran out of at least 3 days ago  No Yes   Sig: Take 1 tablet (10 mg) by mouth daily   budesonide-formoterol (SYMBICORT) 160-4.5 MCG/ACT Inhaler New Rx at not picked up yet  No No   Sig: Inhale 2 puffs into the lungs two times daily Disp 3 inhalers   bumetanide (BUMEX) 2 MG tablet Rx not filled  No No   Sig: Take 1 tablet (2 mg) by mouth daily   carvedilol (COREG) 12.5 MG tablet 6/21/2024 at am  No Yes   Sig: Take 1 tablet (12.5 mg) by mouth 2 times daily (with meals)   cetirizine (ZYRTEC) 5 MG tablet Past Week  No Yes   Sig: Take 1 tablet (5 mg) by mouth daily   clopidogrel (PLAVIX) 75 MG tablet 6/21/2024 at am  No Yes   Sig: Take 1 tablet (75 mg) by mouth daily   diphenoxylate-atropine (LOMOTIL) 2.5-0.025 MG tablet   No Yes   Sig: Take 1 tablet by mouth 4 times daily as needed for diarrhea   famotidine (PEPCID) 20 MG tablet Rx not filled  No No   Sig: Take 1 tablet (20 mg) by mouth 2 times daily   gabapentin (NEURONTIN) 100 MG capsule 6/20/2024 at pm  No Yes   Sig: Take 2 capsules (200 mg) by mouth at bedtime   hydrALAZINE (APRESOLINE) 10 MG tablet Don't think Rx filled  No No   Sig: Take 1 tablet (10 mg) by mouth 4 times daily   miconazole (MICATIN) 2 % external powder   No No   Sig: Apply topically 2 times daily   nicotine (NICODERM CQ) 14 MG/24HR 24 hr patch 6/20/2024  No Yes   Sig: Place 1 patch onto the skin daily   nicotine (NICODERM CQ) 14 MG/24HR 24 hr patch   No No   Sig: Place 1 patch onto the skin every 24 hours   nitroGLYcerin (NITROSTAT) 0.4 MG sublingual tablet  Self No No   Sig: Place 1 tablet (0.4 mg) under the tongue See Admin Instructions for chest pain   ondansetron (ZOFRAN ODT) 4 MG ODT tab   No No   Sig: Take 1 tablet (4 mg) by mouth every 6 hours as needed for nausea or vomiting   oxyCODONE (ROXICODONE) 5 MG tablet 6/21/2024  No Yes   Sig: Take 1 tablet (5 mg) by mouth 2 times  daily as needed for moderate pain   polyethylene glycol (MIRALAX) 17 GM/Dose powder   No No   Sig: Take 17 g by mouth daily   rosuvastatin (CRESTOR) 10 MG tablet 6/20/2024  No Yes   Sig: Take 1 tablet (10 mg) by mouth at bedtime   saccharomyces boulardii (FLORASTOR) 250 MG capsule Unsure  No No   Sig: Take 1 capsule (250 mg) by mouth 2 times daily   silver sulfADIAZINE (SILVADENE) 1 % external cream   No No   Sig: Apply topically daily   wound support modular (EXPEDITE) LIQD bottle   No No   Sig: Take 60 mLs by mouth daily      Facility-Administered Medications: None       ALLERGIES:   Allergies   Allergen Reactions    Contrast Dye Anaphylaxis     Pt reported facial and throat swelling with prior CT contrast    Pantoprazole      Protonix caused diffuse edema    Chantix [Varenicline]      Terrible dreams    Penicillins Itching and Rash       SOCIAL HISTORY:  Social History     Tobacco Use    Smoking status: Former     Current packs/day: 0.25     Average packs/day: 0.3 packs/day for 56.5 years (14.1 ttl pk-yrs)     Types: Cigarettes     Start date: 1968    Smokeless tobacco: Never    Tobacco comments:     1/2 PPD   Vaping Use    Vaping status: Never Used   Substance Use Topics    Alcohol use: Not Currently     Comment: x1-2 yr    Drug use: Yes     Types: Marijuana     Comment: 2x per day       FAMILY HISTORY:  Family History   Problem Relation Age of Onset    Cancer Mother         bladder    Cardiovascular Father         alive,multiple heart attacks    Diabetes Maternal Grandmother     Lung Cancer Maternal Grandmother     Blood Disease Brother         clotting disorder       PHYSICAL EXAM:   Vital Signs with Ranges  Temp: 98.9  F (37.2  C) Temp src: Axillary BP: (!) 146/63 Pulse: 64   Resp: 12 SpO2: 95 % O2 Device: High Flow Nasal Cannula (HFNC) Oxygen Delivery: 45 LPM  I/O last 3 completed shifts:  In: 1120 [P.O.:450; Other:350]  Out: 4920 [Other:3000; Chest Tube:1920]    Constitutional: Alert, oriented, cooperative,  lying in bed in NAD.  HEENT: Eyes nonicteric, oral mucosa moist  Cardiovascular: RRR, normal S1/2, no m/r/g  Respiratory: No breath sounds on left, Right relatively clear no wheezing or crackles  GI: Normoactive bowel sounds, nontender  Skin/Integumen: No rashes  Neuro/Psych: Appropriate affect and mood. A&Ox3, moves all extremities      ADDITIONAL COMMENTS:   I reviewed the patient's new clinical lab test results.   Recent Labs   Lab Test 06/24/24  0633 06/23/24  1646 06/23/24  1549 06/23/24  0849 06/23/24  0609 04/13/24  0655 04/11/24  0643 10/07/23  1704 10/07/23  0516 10/06/23  1928 10/06/23  0551   WBC 5.6  --   --  5.5 6.1   < > 5.0   < > 7.7   < > 9.3   HGB 9.5*  9.5* 10.3* 9.7* 6.4* 6.4*   < > 8.4*   < > 10.0*   < > 10.8*   MCV 95  --   --  99 98   < > 86   < > 87   < > 87     --   --  199 210   < > 151   < > 120*   < > 177   INR  --   --   --   --   --   --  1.31*  --  1.38*  --  1.08    < > = values in this interval not displayed.     Recent Labs   Lab Test 06/24/24  0633 06/23/24  0609 06/22/24  2218 06/22/24  1513 06/22/24  0541   POTASSIUM 3.8 5.3 5.3   < > 7.1*   CHLORIDE 97* 100  --   --  99   CO2 25 19*  --   --  16*   BUN 50.8* 90.3*  --   --  90.1*   ANIONGAP 11 14  --   --  13    < > = values in this interval not displayed.     Recent Labs   Lab Test 06/23/24  0609 06/21/24  1449 05/29/24  0732 05/17/24  1137 05/16/24  0832 05/07/24  0553 05/03/24  1319 12/09/22  1444 08/10/21  0833 06/11/20  0810 06/10/20  1243 09/18/19  0739 02/04/19  0935 02/02/19  0615 02/01/19  1118 02/01/19  0845 07/30/18  0923 08/14/17  1028   ALBUMIN 2.6* 2.4* 2.0*   < > 1.9*   < >  --    < > 3.6   < > 3.6   < >  --    < >  --  4.3   < > 3.8   BILITOTAL <0.2 <0.2  --   --  0.2   < >  --    < > 0.5   < > 0.5   < >  --    < >  --  0.6   < > 0.3   ALT 18 28  --   --  9   < >  --    < > 24   < > 30   < >  --    < >  --  75*   < > 37   AST 15 27  --   --  13   < >  --    < > 19   < > 21   < >  --    < >  --  39   < > 22    PROTEIN  --   --   --   --   --   --  200*  --   --   --   --   --  Negative  --  Negative  --   --   --    LIPASE  --   --   --   --   --   --   --   --  132  --  63*  --   --   --   --  101  --  135   AMYLASE  --   --   --   --   --   --   --   --   --   --   --   --   --   --   --   --   --  46    < > = values in this interval not displayed.       I reviewed the patient's new imaging results.        CONSULTATION ASSESSMENT AND PLAN:    Shirley Hendricks is a 63 year old female who has complex medical history which includes GERD, hypertension, history of B-cell lymphoma, hyperlipidemia, depression, anxiety, MI, peripheral vascular disease status angioplasty, DLE, COPD, CMT disease, tobacco use, CKD stage III with baseline creatinine around 1.9-2.8, who presented to the ER from her clinic as she was told that she has a lot of fluid around her lungs and diagnosed with significantly large left pleural effusion, moderate right pleural effusion, NSTEMI, hyperkalemia, hyponatremia and admitted on 6/21/24     Recent Labs   Lab 06/24/24  0633 06/23/24  1646 06/23/24  1549 06/23/24  0849 06/23/24  0609   HGB 9.5*  9.5* 10.3* 9.7* 6.4* 6.4*      Acute on Chronic anemia  Anemia of chronic disease   *Recent baseline hemoglobin has been fluctuating and has been between 8.4-10.1. Hemoglobin 8.4 on 6/22-->6.4 on 6/23. Patient denies blood loss. Bilirubin is not elevated makes hemolysis less likely. Patient was given 1u pRBC, recheck Hgb was 9.5 via peripheral draw. The Hgb of 6.4 likely was spurious related to an inaccurate PICC line draw, likely Hgb was 8.5-->9.5 with 1u pRBC.  --On 10 L O2  --Supportive care  --Monitor Hgb  --GI will follow        [facility].  Office: [phone]  Cell: [phone] (Dr. Layne)

## 2024-06-24 NOTE — PROGRESS NOTES
[facility]AT Note    Date:4/19/2024  Admission Diagnosis: Arterial Occulusion   Pulmonary History: Hx of 0.25 PPD  Home Nebulizer/ MDI Use: Breo Ellipta DPI   Home Oxygen Use: None   Acuity Level (from RT Assessment flow sheet): 5    Aerosol Therapy Initiated: None, patient has clear/diminished BBS      Pulmonary Hygiene Initiated: None, Patient has non-productive cough      Volume Expansion Therapy Initiated: None, patient achieving 1000ml IS        Current Oxygen Requirement: 2L NC  Current SpO2: 98%       See 'RT Assessments' flow sheet for patient assessment scoring and Acuity Level Details.     Chaim Rios, RT on 4/19/2024 at 10:44 AM     No

## 2024-06-24 NOTE — TELEPHONE ENCOUNTER
Reviewed with patient who agrees with plan. She is currently admitted to hospital for pleural effusion with chest tube placement. No anticipated discharge date. She states she was told to schedule hospital follow up appointment with you within 7 days of discharge and plans to schedule when discharge date know. FYI konstantin Hoffman RN

## 2024-06-24 NOTE — DISCHARGE INSTRUCTIONS
"Location: right stump   Care: provided every other day by primary RN   1. Cleanse every other day with wound cleanser and 4x4\" gauze, pat dry   2. Apply zinc paste to wound edges (no need to scrub off all white paste, reapply more)   3. Cover wound base with Aquacel Ag Advantage (silver alginate)   4. Add ABD as needed for drainage   5. Secure with stockinette or ace wrap or EdemaWear (or like product)  "

## 2024-06-25 ENCOUNTER — APPOINTMENT (OUTPATIENT)
Dept: GENERAL RADIOLOGY | Facility: CLINIC | Age: 64
DRG: 205 | End: 2024-06-25
Attending: THORACIC SURGERY (CARDIOTHORACIC VASCULAR SURGERY)
Payer: COMMERCIAL

## 2024-06-25 LAB
ALBUMIN SERPL BCG-MCNC: 2.4 G/DL (ref 3.5–5.2)
ANION GAP SERPL CALCULATED.3IONS-SCNC: 9 MMOL/L (ref 7–15)
BUN SERPL-MCNC: 20.6 MG/DL (ref 8–23)
CALCIUM SERPL-MCNC: 7.6 MG/DL (ref 8.8–10.2)
CHLORIDE SERPL-SCNC: 99 MMOL/L (ref 98–107)
CREAT SERPL-MCNC: 1.89 MG/DL (ref 0.51–0.95)
DEPRECATED HCO3 PLAS-SCNC: 28 MMOL/L (ref 22–29)
EGFRCR SERPLBLD CKD-EPI 2021: 29 ML/MIN/1.73M2
ERYTHROCYTE [DISTWIDTH] IN BLOOD BY AUTOMATED COUNT: 15 % (ref 10–15)
GLUCOSE SERPL-MCNC: 83 MG/DL (ref 70–99)
HCT VFR BLD AUTO: 28 % (ref 35–47)
HGB BLD-MCNC: 9 G/DL (ref 11.7–15.7)
MCH RBC QN AUTO: 30.8 PG (ref 26.5–33)
MCHC RBC AUTO-ENTMCNC: 32.1 G/DL (ref 31.5–36.5)
MCV RBC AUTO: 96 FL (ref 78–100)
PATH REPORT.COMMENTS IMP SPEC: NORMAL
PATH REPORT.FINAL DX SPEC: NORMAL
PATH REPORT.GROSS SPEC: NORMAL
PATH REPORT.MICROSCOPIC SPEC OTHER STN: NORMAL
PHOSPHATE SERPL-MCNC: 4.2 MG/DL (ref 2.5–4.5)
PLATELET # BLD AUTO: 184 10E3/UL (ref 150–450)
POTASSIUM SERPL-SCNC: 3.2 MMOL/L (ref 3.4–5.3)
QUANTIFERON MITOGEN: 10 IU/ML
QUANTIFERON NIL TUBE: 0.03 IU/ML
QUANTIFERON TB1 TUBE: 0.03 IU/ML
QUANTIFERON TB2 TUBE: 0.04
RBC # BLD AUTO: 2.92 10E6/UL (ref 3.8–5.2)
SODIUM SERPL-SCNC: 136 MMOL/L (ref 135–145)
WBC # BLD AUTO: 4.6 10E3/UL (ref 4–11)

## 2024-06-25 PROCEDURE — 250N000009 HC RX 250: Performed by: INTERNAL MEDICINE

## 2024-06-25 PROCEDURE — 250N000011 HC RX IP 250 OP 636: Performed by: HOSPITALIST

## 2024-06-25 PROCEDURE — 88305 TISSUE EXAM BY PATHOLOGIST: CPT | Mod: 26 | Performed by: PATHOLOGY

## 2024-06-25 PROCEDURE — 94799 UNLISTED PULMONARY SVC/PX: CPT

## 2024-06-25 PROCEDURE — 99233 SBSQ HOSP IP/OBS HIGH 50: CPT | Mod: 24 | Performed by: INTERNAL MEDICINE

## 2024-06-25 PROCEDURE — 99232 SBSQ HOSP IP/OBS MODERATE 35: CPT | Performed by: INTERNAL MEDICINE

## 2024-06-25 PROCEDURE — 94660 CPAP INITIATION&MGMT: CPT

## 2024-06-25 PROCEDURE — 250N000013 HC RX MED GY IP 250 OP 250 PS 637: Performed by: STUDENT IN AN ORGANIZED HEALTH CARE EDUCATION/TRAINING PROGRAM

## 2024-06-25 PROCEDURE — 250N000011 HC RX IP 250 OP 636: Mod: JZ | Performed by: INTERNAL MEDICINE

## 2024-06-25 PROCEDURE — 71045 X-RAY EXAM CHEST 1 VIEW: CPT

## 2024-06-25 PROCEDURE — 99233 SBSQ HOSP IP/OBS HIGH 50: CPT | Performed by: INTERNAL MEDICINE

## 2024-06-25 PROCEDURE — 85027 COMPLETE CBC AUTOMATED: CPT | Performed by: INTERNAL MEDICINE

## 2024-06-25 PROCEDURE — 250N000013 HC RX MED GY IP 250 OP 250 PS 637: Performed by: HOSPITALIST

## 2024-06-25 PROCEDURE — 80069 RENAL FUNCTION PANEL: CPT | Performed by: INTERNAL MEDICINE

## 2024-06-25 PROCEDURE — 94640 AIRWAY INHALATION TREATMENT: CPT | Mod: 76

## 2024-06-25 PROCEDURE — 88112 CYTOPATH CELL ENHANCE TECH: CPT | Mod: 26 | Performed by: PATHOLOGY

## 2024-06-25 PROCEDURE — 94640 AIRWAY INHALATION TREATMENT: CPT

## 2024-06-25 PROCEDURE — 999N000157 HC STATISTIC RCP TIME EA 10 MIN

## 2024-06-25 PROCEDURE — 250N000013 HC RX MED GY IP 250 OP 250 PS 637: Performed by: INTERNAL MEDICINE

## 2024-06-25 PROCEDURE — 120N000013 HC R&B IMCU

## 2024-06-25 RX ORDER — HYDRALAZINE HYDROCHLORIDE 20 MG/ML
10 INJECTION INTRAMUSCULAR; INTRAVENOUS EVERY 4 HOURS PRN
Status: DISCONTINUED | OUTPATIENT
Start: 2024-06-25 | End: 2024-07-08 | Stop reason: HOSPADM

## 2024-06-25 RX ORDER — BUMETANIDE 0.25 MG/ML
2 INJECTION INTRAMUSCULAR; INTRAVENOUS DAILY
Status: DISCONTINUED | OUTPATIENT
Start: 2024-06-25 | End: 2024-06-27

## 2024-06-25 RX ORDER — SODIUM CHLORIDE FOR INHALATION 3 %
3 VIAL, NEBULIZER (ML) INHALATION
Status: DISCONTINUED | OUTPATIENT
Start: 2024-06-25 | End: 2024-07-08 | Stop reason: HOSPADM

## 2024-06-25 RX ADMIN — Medication 250 MG: at 09:07

## 2024-06-25 RX ADMIN — FAMOTIDINE 10 MG: 10 TABLET, FILM COATED ORAL at 09:07

## 2024-06-25 RX ADMIN — ONDANSETRON 4 MG: 4 TABLET, ORALLY DISINTEGRATING ORAL at 16:22

## 2024-06-25 RX ADMIN — HYDROMORPHONE HYDROCHLORIDE 2 MG: 2 TABLET ORAL at 22:43

## 2024-06-25 RX ADMIN — THROMBIN TOPICAL RECOMBINANT: KIT at 18:08

## 2024-06-25 RX ADMIN — CLOPIDOGREL BISULFATE 75 MG: 75 TABLET ORAL at 09:07

## 2024-06-25 RX ADMIN — FLUTICASONE FUROATE AND VILANTEROL TRIFENATATE 1 PUFF: 200; 25 POWDER RESPIRATORY (INHALATION) at 09:10

## 2024-06-25 RX ADMIN — AMLODIPINE BESYLATE 10 MG: 10 TABLET ORAL at 09:06

## 2024-06-25 RX ADMIN — POLYETHYLENE GLYCOL 3350 17 G: 17 POWDER, FOR SOLUTION ORAL at 09:06

## 2024-06-25 RX ADMIN — NICOTINE 1 PATCH: 14 PATCH, EXTENDED RELEASE TRANSDERMAL at 09:10

## 2024-06-25 RX ADMIN — IPRATROPIUM BROMIDE AND ALBUTEROL SULFATE 3 ML: .5; 3 SOLUTION RESPIRATORY (INHALATION) at 15:49

## 2024-06-25 RX ADMIN — BUMETANIDE 2 MG: 0.25 INJECTION INTRAMUSCULAR; INTRAVENOUS at 13:27

## 2024-06-25 RX ADMIN — Medication 250 MG: at 20:22

## 2024-06-25 RX ADMIN — SODIUM CHLORIDE SOLN NEBU 3% 3 ML: 3 NEBU SOLN at 15:49

## 2024-06-25 ASSESSMENT — ACTIVITIES OF DAILY LIVING (ADL)
ADLS_ACUITY_SCORE: 53
ADLS_ACUITY_SCORE: 53
ADLS_ACUITY_SCORE: 50
ADLS_ACUITY_SCORE: 53
ADLS_ACUITY_SCORE: 51
ADLS_ACUITY_SCORE: 53
ADLS_ACUITY_SCORE: 50
ADLS_ACUITY_SCORE: 50
ADLS_ACUITY_SCORE: 53
ADLS_ACUITY_SCORE: 51
ADLS_ACUITY_SCORE: 53
ADLS_ACUITY_SCORE: 55
ADLS_ACUITY_SCORE: 50
ADLS_ACUITY_SCORE: 53
ADLS_ACUITY_SCORE: 53
ADLS_ACUITY_SCORE: 51
ADLS_ACUITY_SCORE: 55
ADLS_ACUITY_SCORE: 50
ADLS_ACUITY_SCORE: 55

## 2024-06-25 NOTE — PROVIDER NOTIFICATION
MD Notification    Notified Person: MD    Notified Person Name:     Notification Date/Time: 6/24/24     Notification Interaction: [id number]    Purpose of Notification: vocera     Orders Received: requests compazine prn    Comments: ordered

## 2024-06-25 NOTE — PROGRESS NOTES
[facility]    Pulmonary, Allergy, Critical Care and Sleep Medicine    Follow-up Note  June 25, 2024         Assessment and Plan:   Shirley Hendricks is a 63 year old female who was admitted on 6/21/2024. I was asked to see the patient for left pleural effusion, left mainstem bronchus occlusion.     #Acute hypoxic respiratory failure  This is likely secondary to the combination of underlying volume overload with bilateral pleural effusions, COPD and left mainstem obstruction leading to left lower lobe atelectasis.  # Left greater than right pleural effusion.  S/p thoracentesis and chest tube placement on the left side.  Fluid transudative by lights criteria.  Now with left-sided hydropneumothorax.  In the setting of anuria, LIMA as well as HFpEF.  # Left lower lobe collapse with left mainstem bronchus occlusion.  The most likely cause would be mucous plugging.  Patient states that since her AKA and with subsequent complications, she has been mostly bedbound and is not been able to expectorate well.  Patient also has significant smoking history and is at risk for lung cancer as well as an endobronchial lesion as well.  However, she has a CT chest earlier in the year which did not show any pulmonary nodules or lesions around the left lower lobe suspicious of neoplasm.  Hence, possible but less likely.  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  Currently on HD.    Being followed by nephrology.  # Hyperkalemia, hyponatremia, metabolic acidosis.  # HFpEF, volume overload.  # Acute on chronic anemia.  # HTN, HL, CAD.  # History of PVD, with recent AKA on the right with wound debridement subsequently.  # COPD.  # Tobacco use.     - Patient supplemental oxygen needs much better today.  Continue to wean her supplemental oxygen with goal O2 sats of above 88%.  - As for her pleural effusion, fluid appears to be transudative on both the pleural fluid studies by lights criteria.  - Volume status is also much better  post dialysis yesterday.  Nephrology following.  Patient also has started making urine and would be started on IV Bumex per nephrology recommendations.  - For the chest tube management, her chest tube was clamped in the morning.  If any worsening hypoxia or symptoms, would change it back to suction.  If no pneumothorax on repeat chest x-ray tomorrow or worsening effusion, will discontinue the chest tube in the morning.  - For underlying left mainstem obstruction, discussed with the patient about the importance of pulmonary hygiene including the use of incentive spirometer, flutter valve, intrapulmonary percussive therapy.  Patient verbalized understanding and agreed to be aggressive with the conservative measures.  - Will change her Mucomyst nebulizers to hypertonic saline as patient had nausea from ex smell.  - Will repeat a chest x-ray tomorrow morning.  - If persistent left lung collapse present with failure of the conservative measures, we will proceed with bronchoscopy for therapeutic aspiration of secretions.  - PT/OT.  -Patient is also on Plavix which will need to be stopped if and when bronchoscopy is planned for consideration of bronchoscopy with biopsies.  - Currently, have low suspicion of infection.  But patient is at high risk of developing a pneumonia.  Continue to monitor closely for signs and symptoms of infection.  Low threshold to initiate antibiotics.  - Rest of the plan as per the primary team.    BURKE Gandhi   of Medicine  [facility]  Pulmonary, Allergy, Critical Care and Sleep Medicine  Pager - [phone]         Interval History:     - Patient feeling significantly better this morning.  - Underwent dialysis yesterday with weight noted to be 4 kg/postdialysis.  Also has started making urine.  - Supplemental oxygen requirements have been weaned down to 4 L.  - Patient refused her percussive therapy overnight due to nausea.  Also refused Mucomyst.  -  Chest tube continues on waterseal with output of around 480 mL noted in the last 24 hours.  - Morning CXR continues to show left-sided lung atelectasis           Review of Systems:   C: negative for fever, chills, change in weight  INTEGUMENTARY/SKIN: no rash or obvious new lesions  ENT/MOUTH: no sore throat, new sinus pain or nasal drainage  RESP: see interval history  CV: negative for chest pain, palpitations or peripheral edema  GI: no nausea, vomiting, change in stools  MUSCULOSKELETAL: no myalgias, arthralgias          Medications:     Current Facility-Administered Medications   Medication Dose Route Frequency Provider Last Rate Last Admin    - MEDICATION INSTRUCTIONS for Dialysis Patients -   Does not apply See Admin Instructions Love Gross, [facility]        amLODIPine (NORVASC) tablet 10 mg  10 mg Oral Daily Devin Ravi MD   10 mg at 06/25/24 0906    clopidogrel (PLAVIX) tablet 75 mg  75 mg Oral Daily Kiana Joseph MD   75 mg at 06/25/24 0907    famotidine (PEPCID) tablet 10 mg  10 mg Oral Daily Devin Ravi MD   10 mg at 06/25/24 0907    fluticasone-vilanterol (BREO ELLIPTA) 200-25 MCG/ACT inhaler 1 puff  1 puff Inhalation Daily Bruce Ulloa MD   1 puff at 06/25/24 0910    sodium chloride 0.9% DIALYSIS Cath LOCK - BLUE Lumen  1.3-2.6 mL Intracatheter Once Leonor Salomon DO        Followed by    heparin 1000 unit/mL DIALYSIS Cath LOCK - BLUE Lumen  3 mL Intracatheter Once Leonor Salomon DO        sodium chloride 0.9% DIALYSIS Cath LOCK - RED Lumen  1.3-2.6 mL Intracatheter Once Leonor Salomon DO        Followed by    heparin 1000 unit/mL DIALYSIS Cath LOCK - RED Lumen  3 mL Intracatheter Once Leonor Salomon DO        HYDROmorphone (PF) (DILAUDID) injection 0.3 mg  0.3 mg Intravenous Once Devin Ravi MD        ipratropium - albuterol 0.5 mg/2.5 mg/3 mL (DUONEB) neb solution 3 mL  3 mL Nebulization 4x daily Pham,  MD Rod   3 mL at 06/24/24 1557    nicotine (NICODERM CQ) 14 MG/24HR 24 hr patch 1 patch  1 patch Transdermal Daily Bruce Ulloa MD   1 patch at 06/25/24 0910    polyethylene glycol (MIRALAX) Packet 17 g  17 g Oral Daily Bruce Ulloa MD   17 g at 06/25/24 0906    [Held by provider] rosuvastatin (CRESTOR) tablet 10 mg  10 mg Oral At Bedtime Bruce Ulloa MD   10 mg at 06/22/24 2159    saccharomyces boulardii (FLORASTOR) capsule 250 mg  250 mg Oral BID Bruce Ulloa MD   250 mg at 06/25/24 0907    sodium chloride (PF) 0.9% PF flush 3 mL  3 mL Intracatheter Q8H Bruce Ulloa MD   3 mL at 06/24/24 1747     Current Facility-Administered Medications   Medication Dose Route Frequency Provider Last Rate Last Admin    acetaminophen (TYLENOL) tablet 650 mg  650 mg Oral Q4H PRN Ann Araya MD   650 mg at 06/24/24 2127    Or    acetaminophen (TYLENOL) Suppository 650 mg  650 mg Rectal Q4H PRN Ann Araya MD        calcium carbonate (TUMS) chewable tablet 1,000 mg  1,000 mg Oral 4x Daily PRN Bruce Ulloa MD   1,000 mg at 06/22/24 1823    carboxymethylcellulose PF (REFRESH PLUS) 0.5 % ophthalmic solution 1 drop  1 drop Both Eyes Q1H PRN Devin Ravi MD        glucose gel 15-30 g  15-30 g Oral Q15 Min PRN Kiana Joseph MD        Or    dextrose 50 % injection 25-50 mL  25-50 mL Intravenous Q15 Min PRN Kiana Joseph MD        Or    glucagon injection 1 mg  1 mg Subcutaneous Q15 Min PRN Kiana Joseph MD        guaiFENesin (MUCINEX) 12 hr tablet 600 mg  600 mg Oral BID PRN Ann Araya MD   600 mg at 06/23/24 0830    HYDROmorphone (DILAUDID) tablet 2 mg  2 mg Oral Q3H PRN Devin Ravi MD   2 mg at 06/24/24 0021    HYDROmorphone (DILAUDID) tablet 2 mg  2 mg Oral Q3H PRN Devin Ravi MD   2 mg at 06/24/24 2128    lidocaine (LMX4) cream   Topical Q1H PRN Kiana Joseph MD        lidocaine 1 % 0.1-5 mL  0.1-5 mL Other Q1H PRN  Kiana Joseph MD   2 mL at 06/22/24 2015    naloxone (NARCAN) injection 0.2 mg  0.2 mg Intravenous Q2 Min PRN Bruce Ulloa MD        Or    naloxone (NARCAN) injection 0.4 mg  0.4 mg Intravenous Q2 Min PRN Bruce Ulloa MD        Or    naloxone (NARCAN) injection 0.2 mg  0.2 mg Intramuscular Q2 Min PRN Bruce Ulloa MD        Or    naloxone (NARCAN) injection 0.4 mg  0.4 mg Intramuscular Q2 Min PRN Bruce Ulloa MD        No lozenges or gum should be given while patient on BIPAP/AVAPS/AVAPS AE   Does not apply Continuous PRN Devin Ravi MD        ondansetron (ZOFRAN ODT) ODT tab 4 mg  4 mg Oral Q6H PRN Bruce Ulloa MD   4 mg at 06/23/24 1726    Or    ondansetron (ZOFRAN) injection 4 mg  4 mg Intravenous Q6H PRN Bruce Ulloa MD   4 mg at 06/24/24 2029    Patient may continue current oral medications   Does not apply Continuous PRN Devin Ravi MD        prochlorperazine (COMPAZINE) injection 5 mg  5 mg Intravenous Q6H PRN Dio Fletcher MD   5 mg at 06/24/24 2143    Or    prochlorperazine (COMPAZINE) tablet 5 mg  5 mg Oral Q6H PRN Dio Fletcher MD        Or    prochlorperazine (COMPAZINE) suppository 12.5 mg  12.5 mg Rectal Q12H PRN Dio Fletcher MD        senna-docusate (SENOKOT-S/PERICOLACE) 8.6-50 MG per tablet 1 tablet  1 tablet Oral BID PRN Bruce Ulloa MD        Or    senna-docusate (SENOKOT-S/PERICOLACE) 8.6-50 MG per tablet 2 tablet  2 tablet Oral BID PRN Bruce Ulloa MD        sodium chloride (NEBUSAL) 3 % neb solution 3 mL  3 mL Nebulization Q3H PRN Rod Nath MD        sodium chloride (PF) 0.9% PF flush 3 mL  3 mL Intracatheter q1 min prn Bruce Ulloa MD                Physical Exam:   Temp:  [98  F (36.7  C)-99.1  F (37.3  C)] 98  F (36.7  C)  Pulse:  [53-67] 60  Resp:  [11-19] 14  BP: (122-166)/(57-97) 157/67  FiO2 (%):  [70 %-100 %] 100 %  SpO2:  [90 %-99 %] 95 %    Intake/Output Summary (Last 24 hours) at 6/25/2024 1030  Last data filed at  6/25/2024 0600  Gross per 24 hour   Intake 860 ml   Output 4480 ml   Net -3620 ml     Constitutional:   Awake, alert and in no apparent distress     Eyes:   nonicteric     ENT:    oral mucosa moist without lesions     Neck:   Supple without supraclavicular or cervical lymphadenopathy     Lungs:   Good air flow.  No crackles. No rhonchi.  No wheezes.     Cardiovascular:   Normal S1 and S2.  RRR.  No murmur, gallop or rub.     Abdomen:   NABS, soft, nontender, nondistended.  No HSM.     Musculoskeletal:   No edema     Neurologic:   Alert and conversant.     Skin:   Warm, dry.  No rash on limited exam.             Data:   All laboratory and imaging data reviewed.    CMP  Recent Labs   Lab 06/25/24  0544 06/24/24  0633 06/23/24  0609 06/22/24  2218 06/22/24  2146 06/22/24  1513 06/22/24  0541 06/21/24  1802 06/21/24  1749 06/21/24  1610 06/21/24  1449    133* 133*  --   --   --  128*  --  128*  --  129*   POTASSIUM 3.2* 3.8 5.3 5.3  --    < > 7.1*   < > 6.4*   < > 6.8*   CHLORIDE 99 97* 100  --   --   --  99  --  99  --  99   CO2 28 25 19*  --   --   --  16*  --  17*  --  18*   ANIONGAP 9 11 14  --   --   --  13  --  12  --  12   GLC 83 88 76  --  134*   < > 84   < > 114*   < > 116*   BUN 20.6 50.8* 90.3*  --   --   --  90.1*  --  90.3*  --  87.4*   CR 1.89* 2.84* 3.74*  --   --   --  3.16*  --  3.07*  --  3.01*   GFRESTIMATED 29* 18* 13*  --   --   --  16*  --  16*  --  17*   SONIA 7.6* 7.3* 7.3*  --   --   --  7.8*  --  8.5*  --  7.7*   MAG  --   --   --   --   --   --  1.8  --   --   --   --    PHOS 4.2  --   --   --   --   --   --   --   --   --   --    PROTTOTAL  --   --  4.5*  --   --   --   --   --  5.6*  --  5.2*   ALBUMIN 2.4*  --  2.6*  --   --   --   --   --   --   --  2.4*   BILITOTAL  --   --  <0.2  --   --   --   --   --   --   --  <0.2   ALKPHOS  --   --  43  --   --   --   --   --   --   --  63   AST  --   --  15  --   --   --   --   --   --   --  27   ALT  --   --  18  --   --   --   --   --   --    "--  28    < > = values in this interval not displayed.     CBC  Recent Labs   Lab 06/25/24  0544 06/24/24  0633 06/23/24  1646 06/23/24  1549 06/23/24  0849 06/23/24  0609   WBC 4.6 5.6  --   --  5.5 6.1   RBC 2.92* 3.06*  --   --  2.06* 2.14*   HGB 9.0* 9.5*  9.5* 10.3* 9.7* 6.4* 6.4*   HCT 28.0* 29.2*  --   --  20.3* 20.9*   MCV 96 95  --   --  99 98   MCH 30.8 31.0  --   --  31.1 29.9   MCHC 32.1 32.5  --   --  31.5 30.6*   RDW 15.0 15.7*  --   --  14.7 14.7    201  --   --  199 210     INRNo lab results found in last 7 days.  Arterial Blood GasNo lab results found in last 7 days.  Urine Studies    Recent Labs   Lab Test 05/03/24  1319 02/04/19  0935 02/01/19  1118   URINEPH 6.0 6.0 5.0   NITRITE Negative Negative Negative   LEUKEST Large* Negative Negative   WBCU >182* 0 - 5 1     CMV viral loads  No lab results found.  No results found for: \"CMQNT\", \"CMVQ\"  EBV viral loads   No lab results found.  No results found for: \"EBVDN\", \"EBRES\", \"EBVSP\", \"EBVPC\", \"EBVPCR\"  Respiratory Virus Testing    No results found for: \"RS\", \"FLUAG\"  Sputum Culture last 3 months:  Specimen Description   Date Value Ref Range Status   09/22/2009 Left Groin  Final   08/21/2006 Throat  Final   08/21/2006 Throat  Final    Culture Micro   Date Value Ref Range Status   09/22/2009 Heavy growth Staphylococcus aureus  Final     Comment:     Faxed final report to [phone] on 9.26.09 / AA 08/21/2006 No Beta Streptococcus isolated  Final   01/10/2004 No Beta Streptococcus isolated  Final        Attestation:    I personally spent 55 minutes on the date of the encounter doing chart review, history and exam, documentation and further activities per the note.     BURKE Gandhi   of Medicine  [facility]  Pulmonary, Allergy, Critical Care and Sleep Medicine  Pager - [phone]      "

## 2024-06-25 NOTE — PROVIDER NOTIFICATION
MD Notification    Notified Person: MD    Notified Person Name:Devin Ravi    Notification Date/Time:6/25/24 7:51 and 6/25/24 9:53    Notification Interaction: matt     Purpose of Notification: K=3.2 not on protocol please advise.     Orders Received: none page read at 10:11    Comments: spoke with MD in person  regarding K =3.2 at 1030, per MD no order

## 2024-06-25 NOTE — PROGRESS NOTES
THORACIC SURGERY    Consultation requested for CT management    Reviewed  Agree with pulmonary med recommendations     Not to add at this time  Will follow peripherally    BUZZ REED MD [facility] ONCOLOGY THORACIC SURGERY  CELL:  [phone]  OFFICE: [phone]

## 2024-06-25 NOTE — PROVIDER NOTIFICATION
"Dr aRvi paged at 1712 \"pt dialysis port has been oozing blood since morning, IR was paged, dressing changed, we just paged them again. Am wondering if you might put in an order for hgb recheck.\"    Dr Ravi replied at 1713 \"It's fine to wait until tomorrow \"               "

## 2024-06-25 NOTE — PROGRESS NOTES
"[facility]     Renal Progress Note       SHORTHAND KEY FOR MY NOTES:  c = with, s = without, p = after, a = before, x = except, asx = asymptomatic, tx = transplant or treatment, sx = symptoms or symptomatic, cx = canceled or culture, rxn = reaction, yday = yesterday, nl = normal, abx = antibiotics, fxn = function, dx = diagnosis, dz = disease, m/h = melena/hematochezia, c/d/l/ha = cramping/dizziness/lightheadedness/headache, d/c = discharge or diarrhea/constipation, f/c/n/v = fevers/chills/nausea/vomiting, cp/sob = chest pain/shortness of breath, tbv = total body volume, rxn = reaction, tdc = tunneled dialysis catheter, pta = prior to admission, hd = hemodialysis, pd = peritoneal dialysis, hhd = home hemodialysis, edw = estimated dry wt         Assessment/Plan:     1.  LIMA/CKD IV c severe hyperkalemia.  Dialysis went well yday and she tolerated ~4L UF.  She is breathing a lot better today and now she is starting to make urine again.  Cr is down bc of dialysis; wouldn't assume this is recovery, yet.  A.  Start bumet 2 mg iv every day.  B.  Follow labs, uo, sx daily.  C.  Strict I/Os.    2.  Hypoxia.  Pt is clinically better today and her o2 needs have improved considerably.  She prob has a mucous plug.  A.  Plans per Pulm.  B.  She may need a bronch tmrw if non-interventional tx doesn't work.  C.  Supp o2 prn.    3.  Hyponatremia.  Resolved c fluid removal.    A.  Follow Na.    4.  Anemia. Hb is slightly lower today despite significant UF yday.  No obv bleeding noted.  A.  Follow hb, clinically.    5.  FEN.  Ca corrects for low alb.  K is low again today.    A.  Check BMP in AM.  B.  Give her some OJ for the K.  C.  Check K later today and if still low can give some replacement x 1.  Would not put on protocol yet.    Case d/w Dr. Nath, Alexis/Maria L RNs.        Interval History:     Pt is feeling much better today.  Her breathing has improved.  No c/d/l.  \"I've urinated at least 4x " "today.\"          Medications and Allergies:     Current Facility-Administered Medications   Medication Dose Route Frequency Provider Last Rate Last Admin    - MEDICATION INSTRUCTIONS for Dialysis Patients -   Does not apply See Admin Instructions Love Gross, [facility]        amLODIPine (NORVASC) tablet 10 mg  10 mg Oral Daily Devin Ravi MD   10 mg at 06/25/24 0906    bumetanide (BUMEX) injection 2 mg  2 mg Intravenous Daily Hardy Falcon MD        [Held by provider] clopidogrel (PLAVIX) tablet 75 mg  75 mg Oral Daily Kiana Joseph MD   75 mg at 06/25/24 0907    famotidine (PEPCID) tablet 10 mg  10 mg Oral Daily Devin Ravi MD   10 mg at 06/25/24 0907    fluticasone-vilanterol (BREO ELLIPTA) 200-25 MCG/ACT inhaler 1 puff  1 puff Inhalation Daily Bruce Ulloa MD   1 puff at 06/25/24 0910    sodium chloride 0.9% DIALYSIS Cath LOCK - BLUE Lumen  1.3-2.6 mL Intracatheter Once Leonor Salomon DO        Followed by    heparin 1000 unit/mL DIALYSIS Cath LOCK - BLUE Lumen  3 mL Intracatheter Once Leonor Salomon DO        sodium chloride 0.9% DIALYSIS Cath LOCK - RED Lumen  1.3-2.6 mL Intracatheter Once Leonor Salomon DO        Followed by    heparin 1000 unit/mL DIALYSIS Cath LOCK - RED Lumen  3 mL Intracatheter Once Leonor Salomon DO        HYDROmorphone (PF) (DILAUDID) injection 0.3 mg  0.3 mg Intravenous Once Devin Ravi MD        ipratropium - albuterol 0.5 mg/2.5 mg/3 mL (DUONEB) neb solution 3 mL  3 mL Nebulization 4x daily Rod Nath MD   3 mL at 06/24/24 1557    nicotine (NICODERM CQ) 14 MG/24HR 24 hr patch 1 patch  1 patch Transdermal Daily Bruce Ulloa MD   1 patch at 06/25/24 0910    polyethylene glycol (MIRALAX) Packet 17 g  17 g Oral Daily Bruce Ulloa MD   17 g at 06/25/24 0906    [Held by provider] rosuvastatin (CRESTOR) tablet 10 mg  10 mg Oral At Bedtime Bruce Ulloa MD   10 mg at 06/22/24 7572    " saccharomyces boulardii (FLORASTOR) capsule 250 mg  250 mg Oral BID Bruce Ulloa MD   250 mg at 06/25/24 0907    sodium chloride (PF) 0.9% PF flush 3 mL  3 mL Intracatheter Q8H Bruce Ulloa MD   3 mL at 06/24/24 3292     Allergies   Allergen Reactions    Contrast Dye Anaphylaxis     Pt reported facial and throat swelling with prior CT contrast    Pantoprazole      Protonix caused diffuse edema    Chantix [Varenicline]      Terrible dreams    Penicillins Itching and Rash          Physical Exam:     Vitals were reviewed     , Blood pressure (!) 157/67, pulse 60, temperature 98  F (36.7  C), temperature source Oral, resp. rate 14, weight 58.3 kg (128 lb 8.5 oz), SpO2 95%, not currently breastfeeding.  Wt Readings from Last 3 Encounters:   06/24/24 58.3 kg (128 lb 8.5 oz)   06/05/24 60.3 kg (132 lb 15 oz)   05/29/24 62.3 kg (137 lb 5.6 oz)     Intake/Output Summary (Last 24 hours) at 6/25/2024 1351  Last data filed at 6/25/2024 0600  Gross per 24 hour   Intake 580 ml   Output 4480 ml   Net -3900 ml     GENERAL APPEARANCE: pleasant, NAD, alert  RESP:  diminished, no significant crackles/wheezes  CV: RRR, nl S1/S2   ABDOMEN: s/nt/nd  EXTREMITIES/SKIN: 1+ lle edema, R AKA  OTHER:  + wound VAC, + L chest tube  ACCESS:  temp RIJ TDC         Data:     CBC RESULTS:     Recent Labs   Lab 06/25/24  0544 06/24/24  0633 06/23/24  1646 06/23/24  1549 06/23/24  0849 06/23/24  0609 06/22/24  0541 06/22/24  0220 06/21/24  1449   WBC 4.6 5.6  --   --  5.5 6.1 9.3  --  4.7   RBC 2.92* 3.06*  --   --  2.06* 2.14* 2.83*  --  2.72*   HGB 9.0* 9.5*  9.5* 10.3* 9.7* 6.4* 6.4* 8.9*   < > 8.4*   HCT 28.0* 29.2*  --   --  20.3* 20.9* 27.2*  --  26.5*    201  --   --  199 210 247  --  248    < > = values in this interval not displayed.     Basic Metabolic Panel:  Recent Labs   Lab 06/25/24  0544 06/24/24  0633 06/23/24  0609 06/22/24  2218 06/22/24  2146 06/22/24  1918 06/22/24  1748 06/22/24  1713 06/22/24  1513 06/22/24  0541  06/21/24  1802 06/21/24  1749 06/21/24  1610 06/21/24  1449    133* 133*  --   --   --   --   --   --  128*  --  128*  --  129*   POTASSIUM 3.2* 3.8 5.3 5.3  --   --  5.7*  --  7.0* 7.1*   < > 6.4*   < > 6.8*   CHLORIDE 99 97* 100  --   --   --   --   --   --  99  --  99  --  99   CO2 28 25 19*  --   --   --   --   --   --  16*  --  17*  --  18*   BUN 20.6 50.8* 90.3*  --   --   --   --   --   --  90.1*  --  90.3*  --  87.4*   CR 1.89* 2.84* 3.74*  --   --   --   --   --   --  3.16*  --  3.07*  --  3.01*   GLC 83 88 76  --  134* 145*  --  75  --  84   < > 114*   < > 116*   SONIA 7.6* 7.3* 7.3*  --   --   --   --   --   --  7.8*  --  8.5*  --  7.7*    < > = values in this interval not displayed.     INRNo lab results found in last 7 days.   Attestation:   I have reviewed today's relevant vital signs, notes, medications, labs and imaging.    Hardy Falcon MD  [facility] - Nephrology  [phone]

## 2024-06-25 NOTE — PLAN OF CARE
"Goal Outcome Evaluation:  6/24/2024 [id number]  Summary: SOB, found to have bilateral left greater than right pleural effusion. Thoracentesis done but have another episode of hypoxia, found to have  left mainstem obstruction leading to left lower lobe atelectasis.  - Acute on Chronic anemia    Hx of GERD, hypertension, history of B-cell lymphoma, hyperlipidemia, depression, anxiety, MI, peripheral vascular disease status angioplasty, DLE, COPD, CMT disease, tobacco use, CKD stage III    Orientations: A/O x4, pleasant and calm  Vitals/Pain: VSS on 5 L 02 with NC with humidification, satting at 97%. Able to wean to 4L of o2.    LS are diminished. Pain managed w/ PRN oral dilaudid and tylenol given 1  Tele: SR   Lines/Drains: Double lumen PICC, CDI/SL, blood return noted. CVC Double lumen catheter dialysis to the R upper chest. The dressin has been leaking, oozing blood since pt back from dialysis I the evening. Reinforced dressing to the site. 1 chest tube water seal to the left side, dressing with minimal drainage but CDI.   Skin/Wounds: R AKA with dressing on, CDI. scattered bruising, old healed coccyx wound from PTA.   GI/: purewick in placed per pt requested, continent. No BM this shift. BS+. Had 2 episodes of N/V, anti-nausea given x2. Pt reported \" it always happen after dialysis\".   Labs: Abnormal/Trends, Electrolyte: Hgb 9.5.  Ambulation/Assist: : AX2 with lift, can turn   Plan: continue to wean o2 per pulm                        "

## 2024-06-25 NOTE — PROVIDER NOTIFICATION
Writer spoke w/ Dr. Haro  regarding HD catheter having copious amount of draining resulting in several dressing changes. W/in the last 24 hours. He saw pt at bedside and stated to keep HOB elevated and apply pressure to internal jugular site . Notify IR if drainage continues.    Normal Normal Normal

## 2024-06-25 NOTE — IR NOTE
IR NOTE:    IR called due to a bleeding tunneled catheter.     Thrombin was injected along the tract and a 3-0 Monocryl dissolvable suture was placed around the catheter entrance site (this does NOT need to be removed).     After 10 minutes, not bleeding was observed. The catheter was cleaned and bandaged.     If the catheter bleeds again pressure should be held, but there are no more interventions to be performed and removal of the catheter would need to be considered.     Leonor Salomon, DO  Interventional Radiology - [facility]

## 2024-06-25 NOTE — PROGRESS NOTES
Respiratory care note - Patient received nebulizer with DuoNeb and sodium chloride (NEBUSAL) and tolerated nebulizer well. Attempted Volara and patient became nauseous and vomited. RN aware. Placing volara on hold for now until patient can tolerate it.

## 2024-06-25 NOTE — PLAN OF CARE
Vital signs are stable on 3 L NC ex for elevated BP. Alert and oriented. Lung sounds clear. Bowel sounds audible, flatus yes, blanchable redness on the coccyx,AKA.Has a double lumen PICC.Dialysis cath dressing changed 3 times IR and MD notified because of bleeding.1 chest tube on the left which was clamped by MD.Denied pain and was given Zofran for NV. Up with assist of 2 and lift. Tolerating nannette diet and on 1800 fluid restriction.

## 2024-06-25 NOTE — PROVIDER NOTIFICATION
Writer spoke to Dr. Falcon w/ neph regarding K 3.2 after he placed order for IV bumex. He states no need for replacement. Encouraged PO intake and suggested pt drink some orange juice.

## 2024-06-25 NOTE — CONSULTS
Care Management Initial Consult    General Information  Assessment completed with:  ,    Type of CM/SW Visit: Initial Assessment    Primary Care Provider verified and updated as needed: Yes   Readmission within the last 30 days: no previous admission in last 30 days      Reason for Consult: discharge planning  Advance Care Planning: Advance Care Planning Reviewed: present on chart          Communication Assessment  Patient's communication style: spoken language (English or Bilingual)    Hearing Difficulty or Deaf: no   Wear Glasses or Blind: yes    Cognitive  Cognitive/Neuro/Behavioral: WDL  Level of Consciousness: alert  Arousal Level: opens eyes spontaneously  Orientation: oriented x 4  Mood/Behavior: calm, cooperative  Best Language: 0 - No aphasia  Speech: spontaneous, clear, logical    Living Environment:   People in home: child(ifeanyi), adult     Current living Arrangements: house      Able to return to prior arrangements: yes       Family/Social Support:  Care provided by: self, other (see comments) (Sibling, significant other, children)  Provides care for: no one  Marital Status:   Children, Sibling(s)          Description of Support System: Supportive    Support Assessment: Adequate family and caregiver support    Current Resources:   Patient receiving home care services: Yes  Skilled Home Care Services: Skilled Nursing  Community Resources: None  Equipment currently used at home: none  Supplies currently used at home: None    Employment/Financial:  Employment Status: disabled        Financial Concerns: none   Referral to Financial Worker: No       Does the patient's insurance plan have a 3 day qualifying hospital stay waiver?  No    Lifestyle & Psychosocial Needs:  Social Determinants of Health     Food Insecurity: Low Risk  (1/8/2024)    Food Insecurity     Within the past 12 months, did you worry that your food would run out before you got money to buy more?: No     Within the past 12 months, did the  food you bought just not last and you didn t have money to get more?: No   Depression: Not at risk (6/21/2024)    PHQ-2     PHQ-2 Score: 2   Housing Stability: Low Risk  (1/8/2024)    Housing Stability     Do you have housing? : Yes     Are you worried about losing your housing?: No   Tobacco Use: Medium Risk (6/21/2024)    Patient History     Smoking Tobacco Use: Former     Smokeless Tobacco Use: Never     Passive Exposure: Not on file   Financial Resource Strain: Low Risk  (1/8/2024)    Financial Resource Strain     Within the past 12 months, have you or your family members you live with been unable to get utilities (heat, electricity) when it was really needed?: No   Alcohol Use: Not on file   Transportation Needs: Low Risk  (1/8/2024)    Transportation Needs     Within the past 12 months, has lack of transportation kept you from medical appointments, getting your medicines, non-medical meetings or appointments, work, or from getting things that you need?: No   Physical Activity: Not on file   Interpersonal Safety: Low Risk  (11/13/2023)    Interpersonal Safety     Do you feel physically and emotionally safe where you currently live?: Yes     Within the past 12 months, have you been hit, slapped, kicked or otherwise physically hurt by someone?: No     Within the past 12 months, have you been humiliated or emotionally abused in other ways by your partner or ex-partner?: No   Stress: Not on file   Social Connections: Not on file   Health Literacy: Not on file       Functional Status:  Prior to admission patient needed assistance:   Dependent ADLs:: Wheelchair-independent  Dependent IADLs:: Shopping, Laundry, Cooking, Meal Preparation  Assesssment of Functional Status: At functional baseline        Additional Information:  Writer reviewed patient's chart as writer was consulted for discharge planning.     Shirley Hendricks is a 63 year old female who has complex medical history which includes GERD, hypertension,  history of B-cell lymphoma, hyperlipidemia, depression, anxiety, MI, peripheral vascular disease status angioplasty, DLE, COPD, CMT disease, tobacco use, CKD stage III with baseline creatinine around 1.9-2.8, who presented to the ER from her clinic as she was told that she has a lot of fluid around her lungs and diagnosed with significantly large left pleural effusion, moderate right pleural effusion, NSTEMI, hyperkalemia, hyponatremia and admitted on 6/21/24     Writer is familiar with patient from a previous stay in the hospital. During previous visit with patient she talked about living with her , who is blind and she is the caregiver for. Patient's  is also an alcoholic per patient. Patient spoke with writer for some time about her family dynamics and patient ended with saying she probably needs to see a counselor. Patient has 3 children who are adults as support. Patient is wheelchair bound at baseline now after amputation. Patient has her own wheelchair at home. Patient recently was at [facility] and was discharged 6/7/24. Patient is open to homecare with [facility]. PCP, contacts and insurance are current. Currently there are no recommendations for placement at this time. Patient currently needs dialysis. Will continue to follow for discharge planning.     Tom Hutchins [facility]  Care Management

## 2024-06-25 NOTE — PROGRESS NOTES
[facility]    Hospitalist Progress Note    Interval History   - Significant improvement from yesterday. After dialysis yesterday, O2 needs dropped to ~4L, dyspnea improved, patient started making urine again. Has been using mucomyst and coughing up some phlegm  - Chest tube management per Pulmonology  - Discussed with nurse and Pulmonology    Assessment & Plan   Summary: Shirley Hendricks is a 63 year old female with PMH CAD, MI, peripheral vascular disease status angioplasty and right AKA with wound vac, DLE, COPD, CMT disease, tobacco use, CKD stage III, GERD, hypertension, history of B-cell lymphoma, hyperlipidemia, depression, anxiety, who was admitted on 6/21/2024 with acute hypoxic respiratory failure due to a large left pleural effusion, hyperkalemia, and hyponatremia.   Patient recently admitted to [facility] 3/29-4/22/24 with right limb ischemia in setting of severe PAD ultimately requiring right AKA with complicated post op course (including LIMA requiring HD) after which she was discharged to  [facility]. She was readmitted to [facility] 5/15-5/29/24 with stump eschars requiring surgical debridement and wound VAC placement.   This admission, patient underwent left thoracentesis with 800mL and 1200mL fluid removed. Severe hyperkalemia improved with Lokelma. Patient has been anuric since admission, started on dialysis on 6/23. Chest tube placed 6/23, CT on 6/24 shows ongoing left bronchus obstruction, felt to be mucous plugging per Pulm. Improved after dialysis 6/24, now making urine, O2 needs down to 2-3L.     Left mainstem bronchial obstruction due to mucous plugging  Acute hypoxic respiratory failure due to above  Large left pleural effusion, transudative  S/p left thoracentesis 800mL 6/21, 1200mL 6/22  S/p left chest tube 6/23/2024  Right moderate pleural effusion  Patient with increasing shortness of breath for 1-2 weeks PTA. CXR 6/21 in ED reveals complete opacification of  the left hemithorax. CT chest-large left pleural effusion with complete collapse of the left upper and left lower and occlusion of left mainstem bronchus and a moderate size right pleural effusion. Patient underwent thoracentesis 800mL on 6/21, fluid appears transudative. Patient appears markedly volume overloaded. In the ED she initially required 2L O2 but was transitioned to HFNC overnight 6/22 to 6/23 during RRT.  Reports symptomatic improvement with thoracentesis 6/21 and 6/22, but continues to require 10+L O2 or HFNC on 6/23. Discussed with Pulm on 6/23, concern remains left mainstem bronchus obstruction but need to rule out recurrent pleural effusion as cause, so chest tube placed on 6/23. CT chest on 6/24 shows persistent persistent left bronchial obstruction, per Pulm likely mucous plugging.  - Pleural fluid cytology pending  - Pulmonology consulted and appreciated   - Muycomyst   - Considering bronchoscopy   - Monitor for PNA  - Continue IMC    Anuric renal failure on CKD  Recent acute renal failure in May 2024 needing hemodialysis  Severe hyperkalemia, improved  Hyponatremia  Metabolic acidosis  Recent baseline creatinine has been fluctuating between 1.9-2.8. On admission Creatinine is 3.07, potassium is 7.5, sodium of 128 with bicarb of 17.   In the ER patient did receive albuterol, 2 g calcium gluconate, insulin, Kayexalate 10 g and 40 IV Lasix and 1 L IV fluid bolus. EKG showed sinus bradycardia with prolonged QT. Potassium has been elevated to 7.1 since admission, improved to 5.3 on 6/23 with Lokelma.  Patient has been anuric since admission, no urine output. Etiology of anuric renal failure likely related to left mainstem bronchus obstruction. Due to ongoing anuria, TDC placed 6/23 and started on dialysis.  No UOP on 6/24  - Appreciate Nephrology consult  - Dialysis    Possible acute diastolic congestive heart failure exacerbation  Elevated troponin suspect due to volume overload  Patient presents  with large left pleural effusion, BNP  elevated to 11.9k. Note history of stress cardiomyopathy in Oct 2023 with EF 30-35% which then resolved on echo Nov 2023.    Patient does not have any significant chest discomfort and troponin are 174 and 177 and trending down to 156. Echo 6/22 shows Ef 60-65%, mid anterior and lateral severe hypokinessis, distal inferior hypokinesis, normal RV function. Suspect large component of renal failure contributing.  - Cardiology consult appreciated  - HD per Nephrology, see above  - Tele, weights, I&O    Systolic murmur: Noted systolic murmur 6/23, limited echo 6/24 shows no change from 6/22, likely flow murmur.    Anemia of chronic disease  Acute anemia 6/23 likely spurious  Recent baseline hemoglobin has been fluctuating and has been between 8.4-10.1.   Hemoglobin 8.4 on 6/22-->6.4 on 6/23. Patient denies blood loss. Bilirubin is not elevated makes hemolysis less likely. Patient was given 1u pRBC, recheck Hgb was 9.5 via skin draw. The Hgb of 6.4 likely was spurious related to an inaccurate PICC line draw, likely Hgb was 8.5-->9.5 with 1u pRBC.  - [facility] consult appreciated, signed off  - Daily Hgb checks    Hypertension  Hyperlipidemia  History of coronary disease with an MI in 2019  Left heart cath on 10/4/2023 showed-completely occluded D1 with left to left collaterals from distal LAD to D1, moderate CAD involving proximal to mid RCA not significant by IFR and moderate coronary disease please involving distal small caliber RPL and distal circumflex artery. Last echo 11/23 showed EF of 55 to 60% with normal RV and moderate trileaflet aortic sclerosis  - Hold Coreg  - Resume amlodipine 10mg daily    History of peripheral vascular disease status above-knee amputation on the right with wound dressing status washout on 5/24  Neuropathic pain  Patient has followed with vascular surgery and has noticed during last hospitalization in May 2024 she underwent washout with wound VAC  placement and was discharged on Plavix and Crestor. On exam the amputation stump does not look infected and has wound vac in place   - Continue with PTA Plavix 75 mg daily  - Continues with wound vac, vascular surgery consult appreciated  - Pain control with hydromorphone oral PRN (avoid oxycodone as it is renally cleared)    COPD not in exacerbation  - On exam has no wheezing and continue with as needed symbicort    GERD: Famotidine BID    History of B-cell lymphoma  - Follows with [place] [facility]  - Pending cytology from thoracentesis     Tobacco use  - Continue with nicotine patch    Ongoing stressors  Patient had a recent AKA in 2024 with complicated post op course 2024, and reports   2024.    Clinically Significant Risk Factors        # Hypokalemia: Lowest K = 3.2 mmol/L in last 2 days, will replace as needed       # Hypoalbuminemia: Lowest albumin = 2.4 g/dL at 2024  5:44 AM, will monitor as appropriate     # Hypertension: Noted on problem list             #Precipitous drop in Hgb/Hct: Lowest Hgb this hospitalization: 6.4 g/dL. Will continue to monitor and treat/transfuse as appropriate.           # Financial/Environmental Concerns:            PT/OT: ordered  Diet: Fluid restriction 1800 ML FLUID  Combination Diet Renal Diet (dialysis); 2 gm NA Diet    DVT Prophylaxis: none  Alvarez Catheter: Not present  Lines: PRESENT      PICC 24 Double Lumen Left Brachial vein medial access-Site Assessment: (P) WDL  CVC Right Subclavian-Site Assessment: WDL except;Bleeding  CVC Double Lumen Right Subclavian Tunneled-Site Assessment: (P) WDL    Cardiac Monitoring: ACTIVE order. Indication: renal failure  Code Status: Full Code    Medically Ready for Discharge: Anticipated in 5+ Days      Devin Ravi MD  Hospitalist Service  [facility]  Securely message with [employer] (more info)  Text page via [employer] Paging/Directory     - Total time spent on  encounter is 55 minutes, which includes counseling, coordination of care, time spent discussing with family, and/or time spent discussing with consultants.    Data reviewed today: I reviewed all new labs and imaging results over the last 24 hours.    Physical Exam   Temp: 98  F (36.7  C) Temp src: Oral BP: (!) 157/67 Pulse: 60   Resp: 14 SpO2: 95 % O2 Device: Nasal cannula with humidification Oxygen Delivery: 4 LPM  Vitals:    06/24/24 0605   Weight: 58.3 kg (128 lb 8.5 oz)     Vital Signs with Ranges  Temp:  [98  F (36.7  C)-99.1  F (37.3  C)] 98  F (36.7  C)  Pulse:  [53-67] 60  Resp:  [11-19] 14  BP: (122-166)/(57-97) 157/67  FiO2 (%):  [70 %-100 %] 100 %  SpO2:  [90 %-99 %] 95 %  I/O last 3 completed shifts:  In: 860 [P.O.:860]  Out: 4480 [Other:4000; Chest Tube:480]  O2 requirements: Yes HFNC    Constitutional: Female appears somewhat ill  HEENT: Eyes nonicteric, oral mucosa moist  Cardiovascular: RRR, normal S1/2, no m/r/g  Respiratory: No breath sounds on left, Right relatively clear no wheezing or crackles  Vascular: Anasarca with UE and LLE pitting edema, note R AKA with wound vac  GI: Normoactive bowel sounds, nontender  Skin/Integumen: No rashes  Neuro/Psych: Appropriate affect and mood. A&Ox3, moves all extremities    Medications   Current Facility-Administered Medications   Medication Dose Route Frequency Provider Last Rate Last Admin    No lozenges or gum should be given while patient on BIPAP/AVAPS/AVAPS AE   Does not apply Continuous PRN Devin Ravi MD        Patient may continue current oral medications   Does not apply Continuous PRN Devin Ravi MD         Current Facility-Administered Medications   Medication Dose Route Frequency Provider Last Rate Last Admin    - MEDICATION INSTRUCTIONS for Dialysis Patients -   Does not apply See Admin Instructions Love Gross, [facility]        acetylcysteine (MUCOMYST) 20 % nebulizer solution 4 mL  4 mL Nebulization Q4H Rod Nath MD    4 mL at 06/24/24 1559    amLODIPine (NORVASC) tablet 10 mg  10 mg Oral Daily Devin Ravi MD   10 mg at 06/25/24 0906    clopidogrel (PLAVIX) tablet 75 mg  75 mg Oral Daily Kiana Joseph MD   75 mg at 06/25/24 0907    famotidine (PEPCID) tablet 10 mg  10 mg Oral Daily Devin Ravi MD   10 mg at 06/25/24 0907    fluticasone-vilanterol (BREO ELLIPTA) 200-25 MCG/ACT inhaler 1 puff  1 puff Inhalation Daily Bruce Ulloa MD   1 puff at 06/25/24 0910    sodium chloride 0.9% DIALYSIS Cath LOCK - BLUE Lumen  1.3-2.6 mL Intracatheter Once Leonor Salomon DO        Followed by    heparin 1000 unit/mL DIALYSIS Cath LOCK - BLUE Lumen  3 mL Intracatheter Once Leonor Salomon DO        sodium chloride 0.9% DIALYSIS Cath LOCK - RED Lumen  1.3-2.6 mL Intracatheter Once Leonor Salomon DO        Followed by    heparin 1000 unit/mL DIALYSIS Cath LOCK - RED Lumen  3 mL Intracatheter Once Leonor Salomon DO        HYDROmorphone (PF) (DILAUDID) injection 0.3 mg  0.3 mg Intravenous Once Devin Ravi MD        ipratropium - albuterol 0.5 mg/2.5 mg/3 mL (DUONEB) neb solution 3 mL  3 mL Nebulization 4x daily Rod Nath MD   3 mL at 06/24/24 1557    nicotine (NICODERM CQ) 14 MG/24HR 24 hr patch 1 patch  1 patch Transdermal Daily Bruce Ulloa MD   1 patch at 06/25/24 0910    polyethylene glycol (MIRALAX) Packet 17 g  17 g Oral Daily Bruce Ulloa MD   17 g at 06/25/24 0906    [Held by provider] rosuvastatin (CRESTOR) tablet 10 mg  10 mg Oral At Bedtime Bruce Ulloa MD   10 mg at 06/22/24 2159    saccharomyces boulardii (FLORASTOR) capsule 250 mg  250 mg Oral BID Bruce Ulloa MD   250 mg at 06/25/24 0907    sodium chloride (PF) 0.9% PF flush 3 mL  3 mL Intracatheter Q8H Bruce Ulloa MD   3 mL at 06/24/24 1747       Data   Recent Labs   Lab 06/25/24  0544 06/24/24  0633 06/23/24  1646 06/23/24  1549 06/23/24  0849 06/23/24  0609 06/21/24  1802  06/21/24  1749 06/21/24  1610 06/21/24  1449   WBC 4.6 5.6  --   --  5.5 6.1   < >  --   --  4.7   HGB 9.0* 9.5*  9.5* 10.3*   < > 6.4* 6.4*   < >  --   --  8.4*   MCV 96 95  --   --  99 98   < >  --   --  97    201  --   --  199 210   < >  --   --  248    133*  --   --   --  133*   < > 128*  --  129*   POTASSIUM 3.2* 3.8  --   --   --  5.3   < > 6.4*   < > 6.8*   CHLORIDE 99 97*  --   --   --  100   < > 99  --  99   CO2 28 25  --   --   --  19*   < > 17*  --  18*   BUN 20.6 50.8*  --   --   --  90.3*   < > 90.3*  --  87.4*   CR 1.89* 2.84*  --   --   --  3.74*   < > 3.07*  --  3.01*   ANIONGAP 9 11  --   --   --  14   < > 12  --  12   SONIA 7.6* 7.3*  --   --   --  7.3*   < > 8.5*  --  7.7*   GLC 83 88  --   --   --  76   < > 114*   < > 116*   ALBUMIN 2.4*  --   --   --   --  2.6*  --   --   --  2.4*   PROTTOTAL  --   --   --   --   --  4.5*  --  5.6*  --  5.2*   BILITOTAL  --   --   --   --   --  <0.2  --   --   --  <0.2   ALKPHOS  --   --   --   --   --  43  --   --   --  63   ALT  --   --   --   --   --  18  --   --   --  28   AST  --   --   --   --   --  15  --   --   --  27    < > = values in this interval not displayed.       Imaging:   Recent Results (from the past 24 hour(s))   XR Chest Port 1 View    Narrative    EXAM: XR CHEST PORT 1 VIEW  LOCATION: [facility]  DATE: 6/25/2024    INDICATION: pleural effusion  COMPARISON: Chest x-ray dated 6/23/2024, chest CT dated 6/24/2024      Impression    IMPRESSION: Right-sided central venous catheter terminates over the cavoatrial junction. Left-sided PICC line terminates over the proximal right atrium. Complete opacification of the left hemithorax is redemonstrated. Pneumothorax seen at CT is not   visualized at plain film. Persistent right basilar atelectasis and probable small posterior layering effusion. Left basilar chest tube remains in place.

## 2024-06-26 ENCOUNTER — APPOINTMENT (OUTPATIENT)
Dept: GENERAL RADIOLOGY | Facility: CLINIC | Age: 64
DRG: 205 | End: 2024-06-26
Attending: THORACIC SURGERY (CARDIOTHORACIC VASCULAR SURGERY)
Payer: COMMERCIAL

## 2024-06-26 ENCOUNTER — APPOINTMENT (OUTPATIENT)
Dept: GENERAL RADIOLOGY | Facility: CLINIC | Age: 64
DRG: 205 | End: 2024-06-26
Attending: INTERNAL MEDICINE
Payer: COMMERCIAL

## 2024-06-26 LAB
ALBUMIN SERPL BCG-MCNC: 2.4 G/DL (ref 3.5–5.2)
ANION GAP SERPL CALCULATED.3IONS-SCNC: 8 MMOL/L (ref 7–15)
ATRIAL RATE - MUSE: 81 BPM
BACTERIA SPEC CULT: NORMAL
BUN SERPL-MCNC: 29.8 MG/DL (ref 8–23)
C PNEUM DNA SPEC QL NAA+PROBE: NOT DETECTED
CALCIUM SERPL-MCNC: 7.7 MG/DL (ref 8.8–10.2)
CHLORIDE SERPL-SCNC: 97 MMOL/L (ref 98–107)
CREAT SERPL-MCNC: 2.26 MG/DL (ref 0.51–0.95)
DEPRECATED HCO3 PLAS-SCNC: 28 MMOL/L (ref 22–29)
DIASTOLIC BLOOD PRESSURE - MUSE: NORMAL MMHG
EGFRCR SERPLBLD CKD-EPI 2021: 23 ML/MIN/1.73M2
ERYTHROCYTE [DISTWIDTH] IN BLOOD BY AUTOMATED COUNT: 14.6 % (ref 10–15)
FLUAV H1 2009 PAND RNA SPEC QL NAA+PROBE: NOT DETECTED
FLUAV H1 RNA SPEC QL NAA+PROBE: NOT DETECTED
FLUAV H3 RNA SPEC QL NAA+PROBE: NOT DETECTED
FLUAV RNA SPEC QL NAA+PROBE: NOT DETECTED
FLUBV RNA SPEC QL NAA+PROBE: NOT DETECTED
GAMMA INTERFERON BACKGROUND BLD IA-ACNC: 0.03 IU/ML
GLUCOSE SERPL-MCNC: 86 MG/DL (ref 70–99)
GRAM STAIN RESULT: NORMAL
GRAM STAIN RESULT: NORMAL
HADV DNA SPEC QL NAA+PROBE: NOT DETECTED
HCOV PNL SPEC NAA+PROBE: NOT DETECTED
HCT VFR BLD AUTO: 27.5 % (ref 35–47)
HGB BLD-MCNC: 9.2 G/DL (ref 11.7–15.7)
HGB BLD-MCNC: 9.8 G/DL (ref 11.7–15.7)
HMPV RNA SPEC QL NAA+PROBE: NOT DETECTED
HPIV1 RNA SPEC QL NAA+PROBE: NOT DETECTED
HPIV2 RNA SPEC QL NAA+PROBE: NOT DETECTED
HPIV3 RNA SPEC QL NAA+PROBE: NOT DETECTED
HPIV4 RNA SPEC QL NAA+PROBE: NOT DETECTED
INTERPRETATION ECG - MUSE: NORMAL
KOH PREPARATION: NORMAL
KOH PREPARATION: NORMAL
M PNEUMO DNA SPEC QL NAA+PROBE: NOT DETECTED
M TB IFN-G BLD-IMP: NEGATIVE
M TB IFN-G CD4+ BCKGRND COR BLD-ACNC: 9.97 IU/ML
MCH RBC QN AUTO: 31.4 PG (ref 26.5–33)
MCHC RBC AUTO-ENTMCNC: 33.5 G/DL (ref 31.5–36.5)
MCV RBC AUTO: 94 FL (ref 78–100)
MITOGEN IGNF BCKGRD COR BLD-ACNC: 0 IU/ML
MITOGEN IGNF BCKGRD COR BLD-ACNC: 0.01 IU/ML
P AXIS - MUSE: 37 DEGREES
PHOSPHATE SERPL-MCNC: 4.9 MG/DL (ref 2.5–4.5)
PLATELET # BLD AUTO: 187 10E3/UL (ref 150–450)
POTASSIUM SERPL-SCNC: 3 MMOL/L (ref 3.4–5.3)
PR INTERVAL - MUSE: 152 MS
QRS DURATION - MUSE: 84 MS
QT - MUSE: 366 MS
QTC - MUSE: 425 MS
R AXIS - MUSE: 10 DEGREES
RBC # BLD AUTO: 2.93 10E6/UL (ref 3.8–5.2)
RSV RNA SPEC QL NAA+PROBE: NOT DETECTED
RSV RNA SPEC QL NAA+PROBE: NOT DETECTED
RV+EV RNA SPEC QL NAA+PROBE: NOT DETECTED
SODIUM SERPL-SCNC: 133 MMOL/L (ref 135–145)
SYSTOLIC BLOOD PRESSURE - MUSE: NORMAL MMHG
T AXIS - MUSE: 73 DEGREES
TROPONIN T SERPL HS-MCNC: 167 NG/L
TROPONIN T SERPL HS-MCNC: 171 NG/L
VENTRICULAR RATE- MUSE: 81 BPM
WBC # BLD AUTO: 6 10E3/UL (ref 4–11)

## 2024-06-26 PROCEDURE — 120N000013 HC R&B IMCU

## 2024-06-26 PROCEDURE — 87070 CULTURE OTHR SPECIMN AEROBIC: CPT | Performed by: INTERNAL MEDICINE

## 2024-06-26 PROCEDURE — 87106 FUNGI IDENTIFICATION YEAST: CPT | Performed by: INTERNAL MEDICINE

## 2024-06-26 PROCEDURE — 250N000011 HC RX IP 250 OP 636: Mod: JZ | Performed by: INTERNAL MEDICINE

## 2024-06-26 PROCEDURE — 250N000013 HC RX MED GY IP 250 OP 250 PS 637: Performed by: HOSPITALIST

## 2024-06-26 PROCEDURE — 99152 MOD SED SAME PHYS/QHP 5/>YRS: CPT | Performed by: INTERNAL MEDICINE

## 2024-06-26 PROCEDURE — 85018 HEMOGLOBIN: CPT

## 2024-06-26 PROCEDURE — 99233 SBSQ HOSP IP/OBS HIGH 50: CPT | Performed by: INTERNAL MEDICINE

## 2024-06-26 PROCEDURE — G0500 MOD SEDAT ENDO SERVICE >5YRS: HCPCS | Performed by: INTERNAL MEDICINE

## 2024-06-26 PROCEDURE — 87210 SMEAR WET MOUNT SALINE/INK: CPT | Performed by: INTERNAL MEDICINE

## 2024-06-26 PROCEDURE — 0B978ZZ DRAINAGE OF LEFT MAIN BRONCHUS, VIA NATURAL OR ARTIFICIAL OPENING ENDOSCOPIC: ICD-10-PCS | Performed by: INTERNAL MEDICINE

## 2024-06-26 PROCEDURE — 250N000013 HC RX MED GY IP 250 OP 250 PS 637: Performed by: INTERNAL MEDICINE

## 2024-06-26 PROCEDURE — 0B9B8ZZ DRAINAGE OF LEFT LOWER LOBE BRONCHUS, VIA NATURAL OR ARTIFICIAL OPENING ENDOSCOPIC: ICD-10-PCS | Performed by: INTERNAL MEDICINE

## 2024-06-26 PROCEDURE — 99207 PR APP CREDIT; MD BILLING SHARED VISIT: CPT

## 2024-06-26 PROCEDURE — 71045 X-RAY EXAM CHEST 1 VIEW: CPT

## 2024-06-26 PROCEDURE — 87205 SMEAR GRAM STAIN: CPT | Performed by: INTERNAL MEDICINE

## 2024-06-26 PROCEDURE — 87633 RESP VIRUS 12-25 TARGETS: CPT | Performed by: INTERNAL MEDICINE

## 2024-06-26 PROCEDURE — 0B9G8ZX DRAINAGE OF LEFT UPPER LUNG LOBE, VIA NATURAL OR ARTIFICIAL OPENING ENDOSCOPIC, DIAGNOSTIC: ICD-10-PCS | Performed by: INTERNAL MEDICINE

## 2024-06-26 PROCEDURE — 87206 SMEAR FLUORESCENT/ACID STAI: CPT | Performed by: INTERNAL MEDICINE

## 2024-06-26 PROCEDURE — 85027 COMPLETE CBC AUTOMATED: CPT | Performed by: INTERNAL MEDICINE

## 2024-06-26 PROCEDURE — 99233 SBSQ HOSP IP/OBS HIGH 50: CPT | Mod: 24 | Performed by: INTERNAL MEDICINE

## 2024-06-26 PROCEDURE — 31622 DX BRONCHOSCOPE/WASH: CPT | Performed by: INTERNAL MEDICINE

## 2024-06-26 PROCEDURE — 0B928ZZ DRAINAGE OF CARINA, VIA NATURAL OR ARTIFICIAL OPENING ENDOSCOPIC: ICD-10-PCS | Performed by: INTERNAL MEDICINE

## 2024-06-26 PROCEDURE — 80069 RENAL FUNCTION PANEL: CPT | Performed by: INTERNAL MEDICINE

## 2024-06-26 PROCEDURE — 93005 ELECTROCARDIOGRAM TRACING: CPT

## 2024-06-26 PROCEDURE — 93010 ELECTROCARDIOGRAM REPORT: CPT | Performed by: INTERNAL MEDICINE

## 2024-06-26 PROCEDURE — 0B988ZZ DRAINAGE OF LEFT UPPER LOBE BRONCHUS, VIA NATURAL OR ARTIFICIAL OPENING ENDOSCOPIC: ICD-10-PCS | Performed by: INTERNAL MEDICINE

## 2024-06-26 PROCEDURE — 84484 ASSAY OF TROPONIN QUANT: CPT | Performed by: INTERNAL MEDICINE

## 2024-06-26 PROCEDURE — 999N000065 XR CHEST PORT 1 VIEW

## 2024-06-26 PROCEDURE — 250N000013 HC RX MED GY IP 250 OP 250 PS 637

## 2024-06-26 PROCEDURE — 99232 SBSQ HOSP IP/OBS MODERATE 35: CPT | Performed by: INTERNAL MEDICINE

## 2024-06-26 PROCEDURE — 31645 BRNCHSC W/THER ASPIR 1ST: CPT | Performed by: INTERNAL MEDICINE

## 2024-06-26 PROCEDURE — 87798 DETECT AGENT NOS DNA AMP: CPT | Performed by: INTERNAL MEDICINE

## 2024-06-26 PROCEDURE — 88312 SPECIAL STAINS GROUP 1: CPT | Mod: TC | Performed by: INTERNAL MEDICINE

## 2024-06-26 PROCEDURE — 250N000009 HC RX 250: Performed by: INTERNAL MEDICINE

## 2024-06-26 RX ORDER — POTASSIUM CHLORIDE 1500 MG/1
20 TABLET, EXTENDED RELEASE ORAL 2 TIMES DAILY
Status: DISCONTINUED | OUTPATIENT
Start: 2024-06-26 | End: 2024-06-27

## 2024-06-26 RX ORDER — NITROGLYCERIN 0.4 MG/1
0.4 TABLET SUBLINGUAL EVERY 5 MIN PRN
Status: DISCONTINUED | OUTPATIENT
Start: 2024-06-26 | End: 2024-07-08 | Stop reason: HOSPADM

## 2024-06-26 RX ORDER — LIDOCAINE HYDROCHLORIDE 10 MG/ML
INJECTION, SOLUTION INFILTRATION; PERINEURAL PRN
Status: DISCONTINUED | OUTPATIENT
Start: 2024-06-26 | End: 2024-06-26 | Stop reason: HOSPADM

## 2024-06-26 RX ORDER — FENTANYL CITRATE 50 UG/ML
INJECTION, SOLUTION INTRAMUSCULAR; INTRAVENOUS PRN
Status: DISCONTINUED | OUTPATIENT
Start: 2024-06-26 | End: 2024-06-26 | Stop reason: HOSPADM

## 2024-06-26 RX ORDER — METHOCARBAMOL 750 MG/1
750 TABLET, FILM COATED ORAL 3 TIMES DAILY PRN
Status: DISCONTINUED | OUTPATIENT
Start: 2024-06-26 | End: 2024-07-08 | Stop reason: HOSPADM

## 2024-06-26 RX ADMIN — HYDROMORPHONE HYDROCHLORIDE 2 MG: 2 TABLET ORAL at 15:51

## 2024-06-26 RX ADMIN — POTASSIUM CHLORIDE 20 MEQ: 1500 TABLET, EXTENDED RELEASE ORAL at 14:07

## 2024-06-26 RX ADMIN — METHOCARBAMOL 750 MG: 750 TABLET ORAL at 16:52

## 2024-06-26 RX ADMIN — FAMOTIDINE 10 MG: 10 TABLET, FILM COATED ORAL at 08:49

## 2024-06-26 RX ADMIN — ACETAMINOPHEN 650 MG: 325 TABLET, FILM COATED ORAL at 15:51

## 2024-06-26 RX ADMIN — BUMETANIDE 2 MG: 0.25 INJECTION INTRAMUSCULAR; INTRAVENOUS at 12:17

## 2024-06-26 RX ADMIN — POTASSIUM CHLORIDE 20 MEQ: 1500 TABLET, EXTENDED RELEASE ORAL at 20:34

## 2024-06-26 RX ADMIN — NICOTINE 1 PATCH: 14 PATCH, EXTENDED RELEASE TRANSDERMAL at 12:46

## 2024-06-26 RX ADMIN — HYDROMORPHONE HYDROCHLORIDE 2 MG: 2 TABLET ORAL at 21:44

## 2024-06-26 RX ADMIN — Medication 250 MG: at 08:49

## 2024-06-26 RX ADMIN — AMLODIPINE BESYLATE 10 MG: 10 TABLET ORAL at 08:49

## 2024-06-26 RX ADMIN — Medication 250 MG: at 20:34

## 2024-06-26 ASSESSMENT — ACTIVITIES OF DAILY LIVING (ADL)
ADLS_ACUITY_SCORE: 51
ADLS_ACUITY_SCORE: 50
ADLS_ACUITY_SCORE: 49
ADLS_ACUITY_SCORE: 49
ADLS_ACUITY_SCORE: 51
ADLS_ACUITY_SCORE: 49
ADLS_ACUITY_SCORE: 55
ADLS_ACUITY_SCORE: 50
ADLS_ACUITY_SCORE: 51
ADLS_ACUITY_SCORE: 55
ADLS_ACUITY_SCORE: 51
ADLS_ACUITY_SCORE: 49
ADLS_ACUITY_SCORE: 49
ADLS_ACUITY_SCORE: 55
ADLS_ACUITY_SCORE: 50
ADLS_ACUITY_SCORE: 55
ADLS_ACUITY_SCORE: 51

## 2024-06-26 NOTE — PROGRESS NOTES
Sudden chest pain--check trop, CXR. EKG nonischemic. Trial nitroglycerin Sbublingual.    Devin Ravi MD  Hospitalist

## 2024-06-26 NOTE — PROGRESS NOTES
[facility]    Hospitalist Progress Note    Interval History   - Bronch today with mucous plugging removed. Patient seen after bronch, she is sleepy, no complaints  - Removing chest tube  - WOC consult for coccyx wound  - Replace potassium manually  - Check US for R arm swelling, actually started 4 days ago related to PIV  - Discussed with nurse, Pulm    Assessment & Plan   Summary: Shirley Hendricks is a 63 year old female with PMH CAD, MI, peripheral vascular disease status angioplasty and right AKA with wound vac, DLE, COPD, CMT disease, tobacco use, CKD stage III, GERD, hypertension, history of B-cell lymphoma, hyperlipidemia, depression, anxiety, who was admitted on 6/21/2024 with acute hypoxic respiratory failure due to a large left pleural effusion, hyperkalemia, and hyponatremia.   Patient recently admitted to [facility] 3/29-4/22/24 with right limb ischemia in setting of severe PAD ultimately requiring right AKA with complicated post op course (including LIMA requiring HD) after which she was discharged to  ARU. She was readmitted to [facility] 5/15-5/29/24 with stump eschars requiring surgical debridement and wound VAC placement.   This admission, patient underwent left thoracentesis with 800mL and 1200mL fluid removed. Severe hyperkalemia improved with Lokelma. Patient has been anuric since admission, started on dialysis on 6/23. Chest tube placed 6/23, CT on 6/24 shows ongoing left bronchus obstruction, felt to be mucous plugging per Pulm. Improved after dialysis 6/24, now making urine, O2 needs down to 2-3L.   S/p bronchoscopy 6/26 with mucous plugging removed. Chest tube to be removed 6/26.     Left mainstem bronchial obstruction due to mucous plugging s/p bronchoscopy 6/26/2024  Acute hypoxic respiratory failure due to above, improved  Large left pleural effusion, transudative  S/p left thoracentesis 800mL 6/21, 1200mL 6/22  S/p left chest tube 6/23/2024  Right  moderate pleural effusion  Patient with increasing shortness of breath for 1-2 weeks PTA. CXR 6/21 in ED reveals complete opacification of the left hemithorax. CT chest-large left pleural effusion with complete collapse of the left upper and left lower and occlusion of left mainstem bronchus and a moderate size right pleural effusion. Patient underwent thoracentesis 800mL on 6/21, fluid appears transudative. Patient appears markedly volume overloaded. In the ED she initially required 2L O2 but was transitioned to HFNC overnight 6/22 to 6/23 during RRT.  Reports symptomatic improvement with thoracentesis 6/21 and 6/22, but continues to require 10+L O2 or HFNC on 6/23. Discussed with Pulm on 6/23, concern remains left mainstem bronchus obstruction but need to rule out recurrent pleural effusion as cause, so chest tube placed on 6/23. CT chest on 6/24 shows persistent persistent left bronchial obstruction, per Pulm likely mucous plugging.  - Pleural fluid cytology pending  - Pulmonology consulted and appreciated   - Bronchoscopy done 6/26   - Muycomyst   - Monitor for PNA  - Remove chest tube per Pulm    Anuric renal failure on CKD  Recent acute renal failure in May 2024 needing hemodialysis  Severe hyperkalemia, improved  Hyponatremia  Metabolic acidosis  Recent baseline creatinine has been fluctuating between 1.9-2.8. On admission Creatinine is 3.07, potassium is 7.5, sodium of 128 with bicarb of 17.   In the ER patient did receive albuterol, 2 g calcium gluconate, insulin, Kayexalate 10 g and 40 IV Lasix and 1 L IV fluid bolus. EKG showed sinus bradycardia with prolonged QT. Potassium has been elevated to 7.1 since admission, improved to 5.3 on 6/23 with Lokelma.  Patient has been anuric since admission, no urine output. Etiology of anuric renal failure likely related to left mainstem bronchus obstruction. Due to ongoing anuria, TDC placed 6/23 and started on dialysis.  No UOP on 6/24  - [facility]rology  consult  - Dialysis    Right arm swelling, erythema  Related to PIV which has since been removed  - Check RUE US    Possible acute diastolic congestive heart failure exacerbation  Elevated troponin suspect due to volume overload  Patient presents with large left pleural effusion, BNP  elevated to 11.9k. Note history of stress cardiomyopathy in Oct 2023 with EF 30-35% which then resolved on echo Nov 2023.    Patient does not have any significant chest discomfort and troponin are 174 and 177 and trending down to 156. Echo 6/22 shows Ef 60-65%, mid anterior and lateral severe hypokinessis, distal inferior hypokinesis, normal RV function. Suspect large component of renal failure contributing. Felt less likely to be CHF as this was likely due to renal failure.   - Cardiology consult appreciated  - HD per Nephrology, see above  - Tele, weights, I&O    Systolic murmur: Noted systolic murmur 6/23, limited echo 6/24 shows no change from 6/22, likely flow murmur.    Anemia of chronic disease  Acute anemia 6/23 likely spurious  Recent baseline hemoglobin has been fluctuating and has been between 8.4-10.1.   Hemoglobin 8.4 on 6/22-->6.4 on 6/23. Patient denies blood loss. Bilirubin is not elevated makes hemolysis less likely. Patient was given 1u pRBC, recheck Hgb was 9.5 via skin draw. The Hgb of 6.4 likely was spurious related to an inaccurate PICC line draw, likely Hgb was 8.5-->9.5 with 1u pRBC.  - [facility] consult appreciated, signed off  - Daily Hgb checks    Hypertension  Hyperlipidemia  History of coronary disease with an MI in 2019  Left heart cath on 10/4/2023 showed-completely occluded D1 with left to left collaterals from distal LAD to D1, moderate CAD involving proximal to mid RCA not significant by IFR and moderate coronary disease please involving distal small caliber RPL and distal circumflex artery. Last echo 11/23 showed EF of 55 to 60% with normal RV and moderate trileaflet aortic sclerosis  - Hold Coreg for  now  - Continue amlodipine 10mg daily    History of peripheral vascular disease status above-knee amputation on the right with wound dressing status washout on   Neuropathic pain  Patient has followed with vascular surgery and has noticed during last hospitalization in May 2024 she underwent washout with wound VAC placement and was discharged on Plavix and Crestor. On exam the amputation stump does not look infected and has wound vac in place   - Continue with PTA Plavix 75 mg daily  - Continues with wound vac, vascular surgery consult appreciated  - Pain control with hydromorphone oral PRN (avoid oxycodone as it is renally cleared)    COPD not in exacerbation  - On exam has no wheezing and continue with as needed symbicort    GERD: Famotidine BID    History of B-cell lymphoma  - Follows with [place] [facility]  - Pending cytology from thoracentesis     Tobacco use  - Continue with nicotine patch    Ongoing stressors  Patient had a recent AKA in 2024 with complicated post op course 2024, and reports   2024.    Clinically Significant Risk Factors        # Hypokalemia: Lowest K = 3 mmol/L in last 2 days, will replace as needed       # Hypoalbuminemia: Lowest albumin = 2.4 g/dL at 2024  6:21 AM, will monitor as appropriate     # Hypertension: Noted on problem list             #Precipitous drop in Hgb/Hct: Lowest Hgb this hospitalization: 6.4 g/dL. Will continue to monitor and treat/transfuse as appropriate.           # Financial/Environmental Concerns: none          PT/OT: ordered  Diet: Fluid restriction 1800 ML FLUID  NPO per Anesthesia Guidelines for Procedure/Surgery Except for: Meds    DVT Prophylaxis: none  Alvarez Catheter: Not present  Lines: PRESENT      PICC 24 Double Lumen Left Brachial vein medial access-Site Assessment: WDL  CVC Right Subclavian-Site Assessment: WDL  CVC Double Lumen Right Subclavian Tunneled-Site Assessment: WDL    Cardiac Monitoring: ACTIVE  order. Indication: renal failure  Code Status: Full Code    Medically Ready for Discharge: Anticipated in 5+ Days      Devin Ravi MD  Hospitalist Service  [facility]  Securely message with Chat& (ChatAnd)more info)  Text page via [employer] Paging/Directory     - Total time spent on encounter is 55 minutes, which includes counseling, coordination of care, time spent discussing with family, and/or time spent discussing with consultants.    Data reviewed today: I reviewed all new labs and imaging results over the last 24 hours.    Physical Exam   Temp: 98  F (36.7  C) Temp src: Oral BP: 134/65 Pulse: 60   Resp: 13 SpO2: 95 % O2 Device: Nasal cannula with humidification Oxygen Delivery: 2 LPM  Vitals:    06/24/24 0605 06/26/24 0623   Weight: 58.3 kg (128 lb 8.5 oz) 57.3 kg (126 lb 5.2 oz)     Vital Signs with Ranges  Temp:  [98  F (36.7  C)-98.2  F (36.8  C)] 98  F (36.7  C)  Pulse:  [54-92] 60  Resp:  [8-25] 13  BP: (117-192)/() 134/65  SpO2:  [86 %-98 %] 95 %  I/O last 3 completed shifts:  In: 750 [P.O.:750]  Out: 780 [Urine:700; Chest Tube:80]  O2 requirements: Yes HFNC    Constitutional: Female appears somewhat ill  HEENT: Eyes nonicteric, oral mucosa moist  Cardiovascular: RRR, normal S1/2, no m/r/g  Respiratory: No breath sounds on left, Right relatively clear no wheezing or crackles  Vascular: Anasarca with UE and LLE pitting edema, note R AKA with wound vac  GI: Normoactive bowel sounds, nontender  Skin/Integumen: No rashes  Neuro/Psych: Appropriate affect and mood. A&Ox3, moves all extremities    Medications   Current Facility-Administered Medications   Medication Dose Route Frequency Provider Last Rate Last Admin    No lozenges or gum should be given while patient on BIPAP/AVAPS/AVAPS AE   Does not apply Continuous PRN Devin Ravi MD        Patient may continue current oral medications   Does not apply Continuous PRN Devin Ravi MD         Current  Facility-Administered Medications   Medication Dose Route Frequency Provider Last Rate Last Admin    - MEDICATION INSTRUCTIONS for Dialysis Patients -   Does not apply See Admin Instructions Love Gross, [facility]        amLODIPine (NORVASC) tablet 10 mg  10 mg Oral Daily Devin Ravi MD   10 mg at 06/26/24 0849    bumetanide (BUMEX) injection 2 mg  2 mg Intravenous Daily Hardy Falcon MD   2 mg at 06/26/24 1217    [Held by provider] clopidogrel (PLAVIX) tablet 75 mg  75 mg Oral Daily Kiana Joseph MD   75 mg at 06/25/24 0907    famotidine (PEPCID) tablet 10 mg  10 mg Oral Daily Devin Ravi MD   10 mg at 06/26/24 0849    fluticasone-vilanterol (BREO ELLIPTA) 200-25 MCG/ACT inhaler 1 puff  1 puff Inhalation Daily Bruce Ulloa MD   1 puff at 06/25/24 0910    sodium chloride 0.9% DIALYSIS Cath LOCK - BLUE Lumen  1.3-2.6 mL Intracatheter Once Leonor Salomon DO        Followed by    heparin 1000 unit/mL DIALYSIS Cath LOCK - BLUE Lumen  3 mL Intracatheter Once Leonor Salomon DO        sodium chloride 0.9% DIALYSIS Cath LOCK - RED Lumen  1.3-2.6 mL Intracatheter Once Leonor Salomon DO        Followed by    heparin 1000 unit/mL DIALYSIS Cath LOCK - RED Lumen  3 mL Intracatheter Once Leonor Salomon DO        HYDROmorphone (PF) (DILAUDID) injection 0.3 mg  0.3 mg Intravenous Once Devin Ravi MD        ipratropium - albuterol 0.5 mg/2.5 mg/3 mL (DUONEB) neb solution 3 mL  3 mL Nebulization 4x daily Rod Nath MD   3 mL at 06/25/24 1549    nicotine (NICODERM CQ) 14 MG/24HR 24 hr patch 1 patch  1 patch Transdermal Daily Bruce Ulloa MD   1 patch at 06/25/24 0910    polyethylene glycol (MIRALAX) Packet 17 g  17 g Oral Daily Bruce Ulloa MD   17 g at 06/25/24 0906    [Held by provider] rosuvastatin (CRESTOR) tablet 10 mg  10 mg Oral At Bedtime Bruce Ulloa MD   10 mg at 06/22/24 0142    saccharomyces boulardii (FLORASTOR)  capsule 250 mg  250 mg Oral BID Bruce Ulloa MD   250 mg at 06/26/24 0849    sodium chloride (PF) 0.9% PF flush 3 mL  3 mL Intracatheter Q8H Bruce Ulloa MD   3 mL at 06/24/24 1747       Data   Recent Labs   Lab 06/26/24  0621 06/25/24  0544 06/24/24  0633 06/23/24  0849 06/23/24  0609 06/21/24  1802 06/21/24  1749 06/21/24  1610 06/21/24  1449   WBC 6.0 4.6 5.6   < > 6.1   < >  --   --  4.7   HGB 9.2* 9.0* 9.5*  9.5*   < > 6.4*   < >  --   --  8.4*   MCV 94 96 95   < > 98   < >  --   --  97    184 201   < > 210   < >  --   --  248   * 136 133*  --  133*   < > 128*  --  129*   POTASSIUM 3.0* 3.2* 3.8  --  5.3   < > 6.4*   < > 6.8*   CHLORIDE 97* 99 97*  --  100   < > 99  --  99   CO2 28 28 25  --  19*   < > 17*  --  18*   BUN 29.8* 20.6 50.8*  --  90.3*   < > 90.3*  --  87.4*   CR 2.26* 1.89* 2.84*  --  3.74*   < > 3.07*  --  3.01*   ANIONGAP 8 9 11  --  14   < > 12  --  12   SONIA 7.7* 7.6* 7.3*  --  7.3*   < > 8.5*  --  7.7*   GLC 86 83 88  --  76   < > 114*   < > 116*   ALBUMIN 2.4* 2.4*  --   --  2.6*  --   --   --  2.4*   PROTTOTAL  --   --   --   --  4.5*  --  5.6*  --  5.2*   BILITOTAL  --   --   --   --  <0.2  --   --   --  <0.2   ALKPHOS  --   --   --   --  43  --   --   --  63   ALT  --   --   --   --  18  --   --   --  28   AST  --   --   --   --  15  --   --   --  27    < > = values in this interval not displayed.       Imaging:   Recent Results (from the past 24 hour(s))   XR Chest Port 1 View    Narrative    XR CHEST PORT 1 VIEW 6/26/2024 6:05 AM    HISTORY: pleural effusion    COMPARISON: 6/25/2024      Impression    IMPRESSION: Right IJ tunnel hemodialysis catheter tip at the SVC/right  atrium. Left PICC tip in the high right atrium. Left basilar pigtail  chest tube. Stable complete opacification of the left hemithorax,  likely related to a large left pleural effusion. Stable mild hazy  opacities in the right lung base either due to atelectasis or  pulmonary edema.     BRANDON ALBERTS,  MD         SYSTEM ID:  [id number]

## 2024-06-26 NOTE — CODE/RAPID RESPONSE
[facility]    House BRANDON RRT Note  6/26/2024   Time Called: 16:16 pm     RRT called for: chest pain     Assessment & Plan   Patient had left pleural effusion and left mainstem bronchus occlusion. She is s/p thoracentesis and chest tube placement of the left side. In addition Pulmonary service was consulted for left lower lobe collapse with left mainstem bronchus occlusion s/p bronchoscopy.       Left lateral pleuritic chest pain likely secondary to pulmonary reexpansion     On my arrival pt sitting upright in hospital bed in no acute distress. Reports anterior upper left chest wall pain 5/10 at rest with 10/10 with deep inspiration that radiates to the back. No squeezing or burning. Left chest tube site cdi. Denies dizziness. No SOB just pain with inspiration.     Alternatively considered PTX, ACS     INTERVENTIONS:  -EKG, SR, nonischemic   -pCXR; shows improved aeration of left lung and decrease in left pleural effusion.   -troponin and hgb   -Robaxin 750 mg TID prn for muscle   -Warm packs applied for comfort   -Continue hydromorphone 2 mg q 3hrs prn PO for moderate pain   -Continue [facility] level care with continuous tele     I personally reviewed the radiographs; per my read no PTX, effusions or consolidations     Working diagnosis: likely pleuritic chest pain secondary to pulmonary reexpansion     At the end of the RRT patient remains hemodynamically stable with stable oxygenation on 2L NC.     Disposition IM    Discussed with and defer further cares to hospitalist attending physician Dr. Ravi.    Interval History     Shirley Lopez a 63 year old female w/ PMH of CAD, MI, peripheral vascular disease status angioplasty and right AKA with wound vac, DLE, COPD, CMT disease, tobacco use, CKD stage III, GERD, hypertension, history of B-cell lymphoma, hyperlipidemia, depression, anxiety, who was admitted on 6/21/2024 with acute hypoxic respiratory failure due to a large left pleural  effusion, hyperkalemia, and hyponatremia.     Code Status: Full Code    Allergies   Allergies   Allergen Reactions    Contrast Dye Anaphylaxis     Pt reported facial and throat swelling with prior CT contrast    Pantoprazole      Protonix caused diffuse edema    Chantix [Varenicline]      Terrible dreams    Penicillins Itching and Rash       Physical Exam   Vital Signs with Ranges:  Temp:  [98  F (36.7  C)] 98  F (36.7  C)  Pulse:  [54-92] 69  Resp:  [10-25] 13  BP: (117-192)/() 166/91  SpO2:  [86 %-98 %] 91 %  I/O last 3 completed shifts:  In: 490 [P.O.:490]  Out: 950 [Urine:900; Chest Tube:50]    Physical Exam  Constitutional:       General: She is not in acute distress.     Appearance: She is ill-appearing.   HENT:      Mouth/Throat:      Mouth: Mucous membranes are moist.   Eyes:      Pupils: Pupils are equal, round, and reactive to light.   Cardiovascular:      Rate and Rhythm: Normal rate and regular rhythm.      Pulses: Normal pulses.      Heart sounds: Normal heart sounds.   Pulmonary:      Effort: Pulmonary effort is normal. No tachypnea, accessory muscle usage or respiratory distress.       Chest:      Chest wall: Tenderness present. No crepitus.       Abdominal:      General: Bowel sounds are normal. There is no distension.      Palpations: Abdomen is soft.      Tenderness: There is no abdominal tenderness.   Musculoskeletal:      Left lower leg: 3+ Edema present.      Right Lower Extremity: Right leg is amputated above knee.   Skin:     General: Skin is warm and dry.      Capillary Refill: Capillary refill takes less than 2 seconds.   Neurological:      Mental Status: She is alert and oriented to person, place, and time.   Psychiatric:         Mood and Affect: Mood normal.         Behavior: Behavior normal.         Thought Content: Thought content normal.             Data     EKG:  Interpreted by Soumya Ba NP  Time reviewed: 1620  Symptoms at time of EKG: left sided chest pain    Rhythm:  normal sinus   Rate: Normal  Axis: Normal  Ectopy: none  Conduction: normal  ST Segments/ T Waves: No acute ischemic changes  Q Waves: none  Comparison to prior: Unchanged from 6/22    Clinical Impression: normal EKG      IMAGING: (X-ray/CT/MRI)   Recent Results (from the past 24 hour(s))   XR Chest Port 1 View    Narrative    XR CHEST PORT 1 VIEW 6/26/2024 6:05 AM    HISTORY: pleural effusion    COMPARISON: 6/25/2024      Impression    IMPRESSION: Right IJ tunnel hemodialysis catheter tip at the SVC/right  atrium. Left PICC tip in the high right atrium. Left basilar pigtail  chest tube. Stable complete opacification of the left hemithorax,  likely related to a large left pleural effusion. Stable mild hazy  opacities in the right lung base either due to atelectasis or  pulmonary edema.     BRANDON ALBERTS MD         SYSTEM ID:  [id number]   XR Chest Port 1 View    Narrative    CHEST PORTABLE ONE VIEW   6/26/2024 1:15 PM     HISTORY: Post bronchoscopy.    COMPARISON: 6/26/2024.      Impression    IMPRESSION: Improved aeration of the left upper to midlung. Persistent  consolidation at the left mid to low lung. Cardiac silhouette is  obscured. Right lung shows persistent ill-defined opacity along the  medial right lung base. No pneumothorax identified. Left pleural fluid  appears moderate. Right chest dual lumen venous catheter tip is stable  at the low SVC. Stable left PICC line tip at the cavoatrial level.     SANTANA CHAIDEZ MD         SYSTEM ID:  [id number]       CBC with Diff:  Recent Labs   Lab Test 06/26/24  1637 06/26/24  0621 04/13/24  0655 04/11/24  0643   WBC  --  6.0   < > 5.0   HGB 9.8* 9.2*   < > 8.4*   MCV  --  94   < > 86   PLT  --  187   < > 151   INR  --   --   --  1.31*    < > = values in this interval not displayed.     Troponin: pending   167 likely secondary to renal failure     Comprehensive Metabolic Panel:  Recent Labs   Lab 06/26/24  0621 06/24/24  0633 06/23/24  0609 06/22/24  1513 06/22/24  0541   NA  133*   < > 133*  --  128*   POTASSIUM 3.0*   < > 5.3   < > 7.1*   CHLORIDE 97*   < > 100  --  99   CO2 28   < > 19*  --  16*   ANIONGAP 8   < > 14  --  13   GLC 86   < > 76   < > 84   BUN 29.8*   < > 90.3*  --  90.1*   CR 2.26*   < > 3.74*  --  3.16*   GFRESTIMATED 23*   < > 13*  --  16*   SONIA 7.7*   < > 7.3*  --  7.8*   MAG  --   --   --   --  1.8   PHOS 4.9*   < >  --   --   --    PROTTOTAL  --   --  4.5*  --   --    ALBUMIN 2.4*   < > 2.6*  --   --    BILITOTAL  --   --  <0.2  --   --    ALKPHOS  --   --  43  --   --    AST  --   --  15  --   --    ALT  --   --  18  --   --     < > = values in this interval not displayed.       Time Spent on this Encounter   I spent 30 minutes on the unit/floor managing the care of Shirley Hendricks. Over 50% of my time was spent counseling the patient and/or coordinating care regarding services listed in this note.    Soumya Ba NP  [facility]  Securely message with the Vocera Web Console (learn more here)  Text page via [employer] Paging/Directory

## 2024-06-26 NOTE — PLAN OF CARE
Orientation: A&Ox4, intermittently anxious  Vitals/Pain: VSS on 2L NC ex SBP 170s. Pt reporting chest pain RRT called, trops and xray done, managing with dilaudid and robaxin throughout shift  Tele: NSR/SB with PACs   Lines/Drains: L PICC, R chest dialysis port    LS: diminished, shallow with congested productive cough   GI/: BS +, x1 BM this shift. Pt having minimal urine output throughout shift with purwick in place, nephrology following   Labs: Abnormal/Trends, Electrolyte Replacement- Hgb 9.8, trop 167 - next check 2200, K 3.0 one time oral dose given recheck in AM    Ambulation/Assist: q2h turn   Skin/Wounds: R knee AKA, coccyx redness mepi in place, L groin wound   Plan: bronch completed, samples sent, diet restarted, L CT removed - leaking from site following removal, output decreasing     Goal Outcome Evaluation:    Plan of Care Reviewed With: patient, family  Overall Patient Progress: improving  Outcome Evaluation: CT out today

## 2024-06-26 NOTE — PLAN OF CARE
Goal Outcome Evaluation:  6/25/2024 [id number]  Summary: acute hypoxia respiratory failure, found to have bilateral left greater than right pleural effusion. Thoracentesis done but have another episode of hypoxia, found to have  left mainstem obstruction leading to left lower lobe atelectasis.  - Acute on Chronic anemia    Orientations: A/O x4, pleasant and calm  Vitals/Pain: VSS on 2 L 02 with NC with humidification, satting at 97%.  LS are diminished. Pain managed w/ PRN oral dilaudid given 1  Tele: SR   Lines/Drains: Double lumen PICC, CDI/SL, blood return noted. CVC Double lumen catheter dialysis to the R upper chest, dressing CDI. 1 chest tube water seal to the left side, dressing with minimal drainage but CDI, clamped by MD.      Skin/Wounds: R AKA with dressing on, CDI. scattered bruising, old healed coccyx wound from PTA. Small open wound to the L groin RIN, no drainage     GI/: purewick in placed per pt requested,  BM x1. BS+. Denies N/V this shift    Labs: Abnormal/Trends, Electrolyte: Hgb 9.2    Ambulation/Assist: : AX2 with lift, can turn herself in bed, refused repositioning     Diet: NPO at midnight per pulm MD  Plan: continue to wean o2 per pulm with goal o2 sats of above 88%.  - started on IV Bumex per nephrology recommendations.  - chest tube clamped,  may discontinue chest tube depend on chest Xray results in the morning. Pt has been use incentive spirometer couple of times at night, but refused her percussive therapy overnight due to nausea. See RT notes

## 2024-06-26 NOTE — PROGRESS NOTES
[facility]    Pulmonary, Allergy, Critical Care and Sleep Medicine    Follow-up Note  June 26, 2024         Assessment and Plan:   Shirley Hendricks is a 63 year old female who was admitted on 6/21/2024. I was asked to see the patient for left pleural effusion, left mainstem bronchus occlusion.     #Acute hypoxic respiratory failure  This is likely secondary to the combination of underlying volume overload with bilateral pleural effusions, COPD and left mainstem obstruction leading to left lung atelectasis.  # Left greater than right pleural effusion.  S/p thoracentesis and chest tube placement on the left side.  Fluid transudative by lights criteria.    In the setting of anuria, LIMA as well as HFpEF.  Improved with dialysis and now diuresis.  # Left lower lobe collapse with left mainstem bronchus occlusion.  The most likely cause would be mucous plugging.  Patient states that since her AKA and with subsequent complications, she has been mostly bedbound and is not been able to expectorate well.  Patient also has significant smoking history and is at risk for lung cancer as well as an endobronchial lesion as well.  However, she has a CT chest earlier in the year which did not show any pulmonary nodules or lesions around the left lower lobe suspicious of neoplasm.  Hence, possible but less likely.  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  Currently on HD.    Being followed by nephrology.  # Hyperkalemia, hyponatremia, metabolic acidosis.  # HFpEF, volume overload.  # Acute on chronic anemia.  # HTN, HL, CAD.  # History of PVD, with recent AKA on the right with wound debridement subsequently.  # COPD.  # Tobacco use.     - Patient not being able to tolerate conservative management for pulmonary hygiene including persistent nausea.  - Bedside POCUS shows the effusion on the left side is much better suggesting the most likely cause of the left-sided opacity is persistent atelectasis possibly due to mucous  plugging  - With persistent left lung atelectasis, we will go ahead with bronchoscopy today with therapeutic aspiration of secretions.  No biopsies as the patient is currently on anticoagulation.  - As for her pleural effusion, fluid appears to be transudative on both the pleural fluid studies by lights criteria.  - Defer to nephrology about volume status and diuretic management.  HD is currently on hold.  - No reemergence of pneumothorax with chest tube clamped since yesterday.  Okay to remove chest tube.  To be done by IR.  - We will discontinue the Mucomyst nebulizers as well as chest PT as patient is not being able to tolerate it.  Discussed with the patient that she will need to continue with incentive spirometer and flutter valve post bronchoscopy as well along with PT/OT to ensure she does not have any more mucous plugging.  -Follow-up bronchoscopy cultures.  - Currently, have low suspicion of infection.  But patient is at high risk of developing a pneumonia.  Continue to monitor closely for signs and symptoms of infection.  Low threshold to initiate antibiotics.  - Rest of the plan as per the primary team.     BURKE Gandhi   of Medicine  [facility]  Pulmonary, Allergy, Critical Care and Sleep Medicine  Pager - [phone]           Interval History:     -Had persistent nausea after CBD yesterday along with hypertonic saline nebs.  Refused to use them since then.  - Improving urine output.  -CXR in the morning shows persistent left lung atelectasis.  - Chest tube continues to be clamped.  No pneumothorax seen on CXR.  - Currently on 2 LPM with O2 sats 95-96%.  Patient feels about the same.  No significant chest pain or cough currently.  States she has occasional cough after the CPT or trying incentive telemetry but is not coughing up much phlegm.           Review of Systems:   C: negative for fever, chills, change in weight  INTEGUMENTARY/SKIN: no rash or obvious new  lesions  ENT/MOUTH: no sore throat, new sinus pain or nasal drainage  RESP: see interval history  CV: negative for chest pain, palpitations or peripheral edema  GI: no nausea, vomiting, change in stools  MUSCULOSKELETAL: no myalgias, arthralgias          Medications:     Current Facility-Administered Medications   Medication Dose Route Frequency Provider Last Rate Last Admin    - MEDICATION INSTRUCTIONS for Dialysis Patients -   Does not apply See Admin Instructions Love Gross, [facility]        amLODIPine (NORVASC) tablet 10 mg  10 mg Oral Daily Devin Ravi MD   10 mg at 06/26/24 0849    bumetanide (BUMEX) injection 2 mg  2 mg Intravenous Daily Hardy Falcon MD   2 mg at 06/25/24 1327    [Held by provider] clopidogrel (PLAVIX) tablet 75 mg  75 mg Oral Daily Kiana Joseph MD   75 mg at 06/25/24 0907    famotidine (PEPCID) tablet 10 mg  10 mg Oral Daily Devin Ravi MD   10 mg at 06/26/24 0849    fluticasone-vilanterol (BREO ELLIPTA) 200-25 MCG/ACT inhaler 1 puff  1 puff Inhalation Daily Bruce Ulloa MD   1 puff at 06/25/24 0910    sodium chloride 0.9% DIALYSIS Cath LOCK - BLUE Lumen  1.3-2.6 mL Intracatheter Once Leonor Salomon DO        Followed by    heparin 1000 unit/mL DIALYSIS Cath LOCK - BLUE Lumen  3 mL Intracatheter Once Leonor Salomon DO        sodium chloride 0.9% DIALYSIS Cath LOCK - RED Lumen  1.3-2.6 mL Intracatheter Once Leonor Salomon DO        Followed by    heparin 1000 unit/mL DIALYSIS Cath LOCK - RED Lumen  3 mL Intracatheter Once Leonor Salomon DO        HYDROmorphone (PF) (DILAUDID) injection 0.3 mg  0.3 mg Intravenous Once Devin Ravi MD        ipratropium - albuterol 0.5 mg/2.5 mg/3 mL (DUONEB) neb solution 3 mL  3 mL Nebulization 4x daily Rod Nath MD   3 mL at 06/25/24 1549    nicotine (NICODERM CQ) 14 MG/24HR 24 hr patch 1 patch  1 patch Transdermal Daily Bruce Ulloa MD   1 patch at  06/25/24 0910    polyethylene glycol (MIRALAX) Packet 17 g  17 g Oral Daily Bruce Ulloa MD   17 g at 06/25/24 0906    [Held by provider] rosuvastatin (CRESTOR) tablet 10 mg  10 mg Oral At Bedtime Bruce Ulloa MD   10 mg at 06/22/24 2159    saccharomyces boulardii (FLORASTOR) capsule 250 mg  250 mg Oral BID Bruce Ulloa MD   250 mg at 06/26/24 0849    sodium chloride (PF) 0.9% PF flush 3 mL  3 mL Intracatheter Q8H Bruce Ulloa MD   3 mL at 06/24/24 1747     Current Facility-Administered Medications   Medication Dose Route Frequency Provider Last Rate Last Admin    acetaminophen (TYLENOL) tablet 650 mg  650 mg Oral Q4H PRN Ann Araya MD   650 mg at 06/24/24 2127    Or    acetaminophen (TYLENOL) Suppository 650 mg  650 mg Rectal Q4H PRN Ann Araya MD        calcium carbonate (TUMS) chewable tablet 1,000 mg  1,000 mg Oral 4x Daily PRN Bruce Ulloa MD   1,000 mg at 06/22/24 1823    carboxymethylcellulose PF (REFRESH PLUS) 0.5 % ophthalmic solution 1 drop  1 drop Both Eyes Q1H PRN Devin Ravi MD        glucose gel 15-30 g  15-30 g Oral Q15 Min PRN Kiana Joseph MD        Or    dextrose 50 % injection 25-50 mL  25-50 mL Intravenous Q15 Min PRN Kiana Joseph MD        Or    glucagon injection 1 mg  1 mg Subcutaneous Q15 Min PRN Kiana Joseph MD        guaiFENesin (MUCINEX) 12 hr tablet 600 mg  600 mg Oral BID PRN Ann Araya MD   600 mg at 06/23/24 0830    hydrALAZINE (APRESOLINE) injection 10 mg  10 mg Intravenous Q4H PRN Devin Ravi MD        HYDROmorphone (DILAUDID) tablet 2 mg  2 mg Oral Q3H PRN Devin Ravi MD   2 mg at 06/24/24 0021    HYDROmorphone (DILAUDID) tablet 2 mg  2 mg Oral Q3H PRN Devin Rvai MD   2 mg at 06/25/24 7108    naloxone (NARCAN) injection 0.2 mg  0.2 mg Intravenous Q2 Min PRN Bruce Ulloa MD        Or    naloxone (NARCAN) injection 0.4 mg  0.4 mg Intravenous Q2 Min PRN Artemio  MD Bruce        Or    naloxone (NARCAN) injection 0.2 mg  0.2 mg Intramuscular Q2 Min PRN Bruce Ulloa MD        Or    naloxone (NARCAN) injection 0.4 mg  0.4 mg Intramuscular Q2 Min PRN Bruce Ulloa MD        No lozenges or gum should be given while patient on BIPAP/AVAPS/AVAPS AE   Does not apply Continuous PRN Devin Ravi MD        ondansetron (ZOFRAN ODT) ODT tab 4 mg  4 mg Oral Q6H PRN Bruce Ulloa MD   4 mg at 06/25/24 1622    Or    ondansetron (ZOFRAN) injection 4 mg  4 mg Intravenous Q6H PRN Bruce Ulloa MD   4 mg at 06/24/24 2029    Patient may continue current oral medications   Does not apply Continuous PRN Devin Ravi MD        prochlorperazine (COMPAZINE) injection 5 mg  5 mg Intravenous Q6H PRN Dio Fletcher MD   5 mg at 06/24/24 2143    Or    prochlorperazine (COMPAZINE) tablet 5 mg  5 mg Oral Q6H PRN Dio Fletcher MD        Or    prochlorperazine (COMPAZINE) suppository 12.5 mg  12.5 mg Rectal Q12H PRN Dio Fletcher MD        senna-docusate (SENOKOT-S/PERICOLACE) 8.6-50 MG per tablet 1 tablet  1 tablet Oral BID PRN Bruce Ulloa MD        Or    senna-docusate (SENOKOT-S/PERICOLACE) 8.6-50 MG per tablet 2 tablet  2 tablet Oral BID PRN Bruce Ulloa MD        sodium chloride (NEBUSAL) 3 % neb solution 3 mL  3 mL Nebulization Q3H PRN Rod Nath MD   3 mL at 06/25/24 1549    sodium chloride (PF) 0.9% PF flush 3 mL  3 mL Intracatheter q1 min prn Bruce Ulloa MD                Physical Exam:   Temp:  [98  F (36.7  C)-98.2  F (36.8  C)] 98  F (36.7  C)  Pulse:  [54-77] 66  Resp:  [8-25] 25  BP: (160-186)/(68-90) 165/89  SpO2:  [92 %-97 %] 96 %    Intake/Output Summary (Last 24 hours) at 6/26/2024 0909  Last data filed at 6/26/2024 0815  Gross per 24 hour   Intake 750 ml   Output 880 ml   Net -130 ml     Constitutional:   Awake, alert and in no apparent distress     Eyes:   nonicteric     ENT:    oral mucosa moist without lesions     Neck:   Supple  without supraclavicular or cervical lymphadenopathy     Lungs:   Good air flow.  No crackles. No rhonchi.  No wheezes.     Cardiovascular:   Normal S1 and S2.  RRR.  No murmur, gallop or rub.     Abdomen:   NABS, soft, nontender, nondistended.  No HSM.     Musculoskeletal:   No edema     Neurologic:   Alert and conversant.     Skin:   Warm, dry.  No rash on limited exam.             Data:   All laboratory and imaging data reviewed.    CMP  Recent Labs   Lab 06/26/24  0621 06/25/24  0544 06/24/24  0633 06/23/24  0609 06/22/24  1513 06/22/24  0541 06/21/24  1802 06/21/24  1749 06/21/24  1610 06/21/24  1449   * 136 133* 133*  --  128*  --  128*  --  129*   POTASSIUM 3.0* 3.2* 3.8 5.3   < > 7.1*   < > 6.4*   < > 6.8*   CHLORIDE 97* 99 97* 100  --  99  --  99  --  99   CO2 28 28 25 19*  --  16*  --  17*  --  18*   ANIONGAP 8 9 11 14  --  13  --  12  --  12   GLC 86 83 88 76   < > 84   < > 114*   < > 116*   BUN 29.8* 20.6 50.8* 90.3*  --  90.1*  --  90.3*  --  87.4*   CR 2.26* 1.89* 2.84* 3.74*  --  3.16*  --  3.07*  --  3.01*   GFRESTIMATED 23* 29* 18* 13*  --  16*  --  16*  --  17*   SONIA 7.7* 7.6* 7.3* 7.3*  --  7.8*  --  8.5*  --  7.7*   MAG  --   --   --   --   --  1.8  --   --   --   --    PHOS 4.9* 4.2  --   --   --   --   --   --   --   --    PROTTOTAL  --   --   --  4.5*  --   --   --  5.6*  --  5.2*   ALBUMIN 2.4* 2.4*  --  2.6*  --   --   --   --   --  2.4*   BILITOTAL  --   --   --  <0.2  --   --   --   --   --  <0.2   ALKPHOS  --   --   --  43  --   --   --   --   --  63   AST  --   --   --  15  --   --   --   --   --  27   ALT  --   --   --  18  --   --   --   --   --  28    < > = values in this interval not displayed.     CBC  Recent Labs   Lab 06/26/24  0621 06/25/24  0544 06/24/24  0633 06/23/24  1646 06/23/24  1549 06/23/24  0849   WBC 6.0 4.6 5.6  --   --  5.5   RBC 2.93* 2.92* 3.06*  --   --  2.06*   HGB 9.2* 9.0* 9.5*  9.5* 10.3*   < > 6.4*   HCT 27.5* 28.0* 29.2*  --   --  20.3*   MCV 94 96 95   "--   --  99   MCH 31.4 30.8 31.0  --   --  31.1   MCHC 33.5 32.1 32.5  --   --  31.5   RDW 14.6 15.0 15.7*  --   --  14.7    184 201  --   --  199    < > = values in this interval not displayed.     INRNo lab results found in last 7 days.  Arterial Blood GasNo lab results found in last 7 days.  Urine Studies    Recent Labs   Lab Test 05/03/24  1319 02/04/19  0935 02/01/19  1118   URINEPH 6.0 6.0 5.0   NITRITE Negative Negative Negative   LEUKEST Large* Negative Negative   WBCU >182* 0 - 5 1     CMV viral loads  No lab results found.  No results found for: \"CMQNT\", \"CMVQ\"  EBV viral loads   No lab results found.  No results found for: \"EBVDN\", \"EBRES\", \"EBVSP\", \"EBVPC\", \"EBVPCR\"  Respiratory Virus Testing    No results found for: \"RS\", \"FLUAG\"  Sputum Culture last 3 months:  Specimen Description   Date Value Ref Range Status   09/22/2009 Left Groin  Final   08/21/2006 Throat  Final   08/21/2006 Throat  Final    Culture Micro   Date Value Ref Range Status   09/22/2009 Heavy growth Staphylococcus aureus  Final     Comment:     Faxed final report to [phone] on 9.26.09 / AA 08/21/2006 No Beta Streptococcus isolated  Final   01/10/2004 No Beta Streptococcus isolated  Final        Attestation:    I personally spent 55 minutes on the date of the encounter doing chart review, history and exam, documentation and further activities per the note.     BURKE Gandhi   of Medicine  [facility]  Pulmonary, Allergy, Critical Care and Sleep Medicine  Pager - [phone]      "

## 2024-06-26 NOTE — PROGRESS NOTES
Shirley Hendricks is a 63 year old woman admitted with a L pleural effusion and LIMA. She had a L chest tube placed on 6/23/24 with no further drainage. Request for a Left chest tube removal made.     After explaining the procedure and answering questions the chest tube was removed intact with minimal discomfort and no complications. Gauze and tape placed over the site post procedure which should be changed daily until the site has healed.     Total time: 20 minutes    Thanks, Maribell [facility] Interventional Radiology CNP ([phone]) (phone [phone])

## 2024-06-26 NOTE — PROGRESS NOTES
[facility]     Renal Progress Note       SHORTHAND KEY FOR MY NOTES:  c = with, s = without, p = after, a = before, x = except, asx = asymptomatic, tx = transplant or treatment, sx = symptoms or symptomatic, cx = canceled or culture, rxn = reaction, yday = yesterday, nl = normal, abx = antibiotics, fxn = function, dx = diagnosis, dz = disease, m/h = melena/hematochezia, c/d/l/ha = cramping/dizziness/lightheadedness/headache, d/c = discharge or diarrhea/constipation, f/c/n/v = fevers/chills/nausea/vomiting, cp/sob = chest pain/shortness of breath, tbv = total body volume, rxn = reaction, tdc = tunneled dialysis catheter, pta = prior to admission, hd = hemodialysis, pd = peritoneal dialysis, hhd = home hemodialysis, edw = estimated dry wt         Assessment/Plan:     1.  LIMA/CKD IV.  Pt is making more urine.  Cr seth a bit but chemistries are ok.  Breathing ok and no significant uremic sx.  She does not need dialysis today but we will continue to monitor clinically.    A.  Continue bumet 2 mg iv every day.  B.  Follow labs, uo, sx daily.  C.  Strict I/Os.    2.  Pleuritic L cp.  This doesn't seem to be cardiac in origin and may be related to the bronch and removal of the L mucous plug.    A.  Follow clinically.  B.  Supp o2 prn.    3.  Anemia. Hb is stable in the 9s.    A.  Follow hb, clinically.    4.  FEN.  K is low so we will try to replace c diet.  Ca corrects for low alb.  Na is fine.   A.  Check BMP in AM.  B.  Give her some OJ for the K.    Case d/w Dr. Ravi.        Interval History:     Pt is having some L cp at present.  It is pleuritic and worsens c deep breathing.  No assoc arm/jaw pain, sweats, n/v.  She continues to urinate.          Medications and Allergies:     Current Facility-Administered Medications   Medication Dose Route Frequency Provider Last Rate Last Admin    - MEDICATION INSTRUCTIONS for Dialysis Patients -   Does not apply See Admin Instructions Love Gross,  [facility]        amLODIPine (NORVASC) tablet 10 mg  10 mg Oral Daily Devin Ravi MD   10 mg at 06/26/24 0849    bumetanide (BUMEX) injection 2 mg  2 mg Intravenous Daily Hardy Falcon MD   2 mg at 06/26/24 1217    clopidogrel (PLAVIX) tablet 75 mg  75 mg Oral Daily Devin Ravi MD   75 mg at 06/25/24 0907    famotidine (PEPCID) tablet 10 mg  10 mg Oral Daily Devin Ravi MD   10 mg at 06/26/24 0849    fluticasone-vilanterol (BREO ELLIPTA) 200-25 MCG/ACT inhaler 1 puff  1 puff Inhalation Daily Bruce Ulloa MD   1 puff at 06/25/24 0910    sodium chloride 0.9% DIALYSIS Cath LOCK - BLUE Lumen  1.3-2.6 mL Intracatheter Once Leonor Salomon DO        Followed by    heparin 1000 unit/mL DIALYSIS Cath LOCK - BLUE Lumen  3 mL Intracatheter Once Leonor Salomon DO        sodium chloride 0.9% DIALYSIS Cath LOCK - RED Lumen  1.3-2.6 mL Intracatheter Once Leonor Salomon DO        Followed by    heparin 1000 unit/mL DIALYSIS Cath LOCK - RED Lumen  3 mL Intracatheter Once Leonor Salomon DO        HYDROmorphone (PF) (DILAUDID) injection 0.3 mg  0.3 mg Intravenous Once Devin Ravi MD        ipratropium - albuterol 0.5 mg/2.5 mg/3 mL (DUONEB) neb solution 3 mL  3 mL Nebulization 4x daily Rod Nath MD   3 mL at 06/25/24 1549    nicotine (NICODERM CQ) 14 MG/24HR 24 hr patch 1 patch  1 patch Transdermal Daily Bruce Ulloa MD   1 patch at 06/26/24 1246    polyethylene glycol (MIRALAX) Packet 17 g  17 g Oral Daily Bruce Ulloa MD   17 g at 06/25/24 0906    potassium chloride masha ER (KLOR-CON M20) CR tablet 20 mEq  20 mEq Oral BID Devin Ravi MD   20 mEq at 06/26/24 1407    [Held by provider] rosuvastatin (CRESTOR) tablet 10 mg  10 mg Oral At Bedtime Bruce Ulloa MD   10 mg at 06/22/24 6673    saccharomyces boulardii (FLORASTOR) capsule 250 mg  250 mg Oral BID Bruce Ulloa MD   250 mg at 06/26/24 0845    sodium chloride (PF)  0.9% PF flush 3 mL  3 mL Intracatheter Q8H Bruce Ulloa MD   3 mL at 06/24/24 9757     Allergies   Allergen Reactions    Contrast Dye Anaphylaxis     Pt reported facial and throat swelling with prior CT contrast    Pantoprazole      Protonix caused diffuse edema    Chantix [Varenicline]      Terrible dreams    Penicillins Itching and Rash          Physical Exam:     Vitals were reviewed     , Blood pressure (!) 169/100, pulse 68, temperature 98  F (36.7  C), temperature source Oral, resp. rate 12, weight 57.3 kg (126 lb 5.2 oz), SpO2 97%, not currently breastfeeding.  Wt Readings from Last 3 Encounters:   06/26/24 57.3 kg (126 lb 5.2 oz)   06/05/24 60.3 kg (132 lb 15 oz)   05/29/24 62.3 kg (137 lb 5.6 oz)     Intake/Output Summary (Last 24 hours) at 6/25/2024 1351  Last data filed at 6/25/2024 0600  Gross per 24 hour   Intake 580 ml   Output 4480 ml   Net -3900 ml     GENERAL APPEARANCE: pleasant, looks uncomfortable, alert  RESP:  diminished, no significant crackles/wheezes  CV: RRR, nl S1/S2   ABDOMEN: s/nt/nd  EXTREMITIES/SKIN: 1+ lle edema, R AKA  ACCESS:  [facility]         Data:     CBC RESULTS:     Recent Labs   Lab 06/26/24  0621 06/25/24  0544 06/24/24  0633 06/23/24  1646 06/23/24  1549 06/23/24  0849 06/23/24  0609 06/22/24  0541   WBC 6.0 4.6 5.6  --   --  5.5 6.1 9.3   RBC 2.93* 2.92* 3.06*  --   --  2.06* 2.14* 2.83*   HGB 9.2* 9.0* 9.5*  9.5* 10.3* 9.7* 6.4* 6.4* 8.9*   HCT 27.5* 28.0* 29.2*  --   --  20.3* 20.9* 27.2*    184 201  --   --  199 210 247     Basic Metabolic Panel:  Recent Labs   Lab 06/26/24  0621 06/25/24  0544 06/24/24  0633 06/23/24  0609 06/22/24  2218 06/22/24  2146 06/22/24  1918 06/22/24  1748 06/22/24  1513 06/22/24  0541 06/21/24  1802 06/21/24  1749   * 136 133* 133*  --   --   --   --   --  128*  --  128*   POTASSIUM 3.0* 3.2* 3.8 5.3 5.3  --   --  5.7*   < > 7.1*   < > 6.4*   CHLORIDE 97* 99 97* 100  --   --   --   --   --  99  --  99   CO2 28 28 25 19*  --    --   --   --   --  16*  --  17*   BUN 29.8* 20.6 50.8* 90.3*  --   --   --   --   --  90.1*  --  90.3*   CR 2.26* 1.89* 2.84* 3.74*  --   --   --   --   --  3.16*  --  3.07*   GLC 86 83 88 76  --  134* 145*  --    < > 84   < > 114*   SONIA 7.7* 7.6* 7.3* 7.3*  --   --   --   --   --  7.8*  --  8.5*    < > = values in this interval not displayed.     INRNo lab results found in last 7 days.   Attestation:   I have reviewed today's relevant vital signs, notes, medications, labs and imaging.    Hardy Falcon MD  [facility] - Nephrology  [phone]

## 2024-06-26 NOTE — PROGRESS NOTES
Pt is refusing Volara treatment. Pt stated that she became nauseated and would prefer we remove the machine from her room. RT will follow up with MD. Adeline Spicer, RRT

## 2024-06-26 NOTE — CONSULTS
Consult completed.     Thanks, Maribell [facility] Interventional Radiology CNP ([phone]) (phone [phone])

## 2024-06-26 NOTE — PROCEDURES
INTERVENTIONAL PULMONOLOGY       Procedure(s):    A flexible bronchoscopy  Airway exam  Therapeutic suctioning (2 sites)    Indication:    Left lung collapse secondary to mucous plugging    Attending of Record:  Rod Nath MD    Medications:    7 ml 1% lidocaine  3 mg versed  75 mcg fentanyl    Sedation Time:   Total sedation time was Choose Duration: 21 minutes of continuous bedside 1:1 monitoring.    Time Out:  Performed    The patient's medical record has been reviewed.  The indication for the procedure was reviewed.  The necessary history and physical examination was performed and reviewed.  The risks, benefits and alternatives of the procedure were discussed with the the patient in detail and she had the opportunity to ask questions.  I discussed in particular the potential complications including risks of minor or life-threatening bleeding and/or infection, respiratory failure, vocal cord trauma / paralysis, pneumothorax, and discomfort. Sedation risks were also discussed including abnormal heart rhythms, low blood pressure, and respiratory failure. All questions were answered to the best of my ability.  Verbal and written informed consent was obtained.  The proposed procedure and the patient's identification were verified prior to the procedure by the physician and the nurse.    The patient was assessed for the adequacy for the procedure and to receive medications.   Mental Status:  Alert and oriented x 3  Airway examination:  Class I (Complete visualization of soft palate)  Pulmonary:  Decreased breathsound throughout the left lung  CV:  RRR, no murmurs or gallops    After clinical evaluation and reviewing the indication, risks, alternatives and benefits of the procedure the patient was deemed to be in satisfactory condition to undergo the procedure.      Immediately before administration of medications the patient was re-assessed for adequacy to receive sedatives including the heart rate, respiratory  rate, mental status, oxygen saturation, blood pressure and adequacy of pulmonary ventilation. These same parameters were continuously monitored throughout the procedure.    A Tuberculosis risk assessment was performed:  The patient has no known RISK of Tuberculosis    The procedure was performed in a negative airflow room: Yes    Maneuvers / Procedure:      Airway Examination: A complete airway examination was performed from the distal trachea to the subsegmental level in each lobe of both lungs.  Pertinent findings include significant amount of thick, gray, mucoid secretions present from the proximal part of the left mainstem up to the danni extending to the left upper as well as lower bronchus.  These were suctioned with airway examination done post mucous plugs removal.  No endobronchial lesions were seen up to the second subsegmental level in all the left-sided lobes.  Bronchial washing: NOMAN was washed and sent for analysis.   Therapeutic suctioning: 15-20min of operative time was spent clearing out the airway of debris, blood and mucous prior to the intervention.     Relevant Pictures      Recommendations:     -Continue pulmonary hygiene measures including incentive spirometry and flutter valve.  - Follow-up bronchial wash culture results.    BURKE Gandhi   of Medicine  [facility]  Pulmonary, Allergy, Critical Care and Sleep Medicine  Pager - [phone]

## 2024-06-27 ENCOUNTER — APPOINTMENT (OUTPATIENT)
Dept: ULTRASOUND IMAGING | Facility: CLINIC | Age: 64
DRG: 205 | End: 2024-06-27
Attending: INTERNAL MEDICINE
Payer: COMMERCIAL

## 2024-06-27 ENCOUNTER — APPOINTMENT (OUTPATIENT)
Dept: GENERAL RADIOLOGY | Facility: CLINIC | Age: 64
DRG: 205 | End: 2024-06-27
Attending: THORACIC SURGERY (CARDIOTHORACIC VASCULAR SURGERY)
Payer: COMMERCIAL

## 2024-06-27 LAB
ALBUMIN SERPL BCG-MCNC: 2.3 G/DL (ref 3.5–5.2)
ANION GAP SERPL CALCULATED.3IONS-SCNC: 9 MMOL/L (ref 7–15)
BACTERIA PLR CULT: NO GROWTH
BUN SERPL-MCNC: 37.9 MG/DL (ref 8–23)
CALCIUM SERPL-MCNC: 7.6 MG/DL (ref 8.8–10.2)
CHLORIDE SERPL-SCNC: 94 MMOL/L (ref 98–107)
CREAT SERPL-MCNC: 2.76 MG/DL (ref 0.51–0.95)
DEPRECATED HCO3 PLAS-SCNC: 27 MMOL/L (ref 22–29)
EGFRCR SERPLBLD CKD-EPI 2021: 18 ML/MIN/1.73M2
ERYTHROCYTE [DISTWIDTH] IN BLOOD BY AUTOMATED COUNT: 14.3 % (ref 10–15)
GLUCOSE BLDC GLUCOMTR-MCNC: 139 MG/DL (ref 70–99)
GLUCOSE SERPL-MCNC: 76 MG/DL (ref 70–99)
GRAM STAIN RESULT: NORMAL
GRAM STAIN RESULT: NORMAL
HCT VFR BLD AUTO: 28.6 % (ref 35–47)
HGB BLD-MCNC: 9.4 G/DL (ref 11.7–15.7)
MCH RBC QN AUTO: 31.1 PG (ref 26.5–33)
MCHC RBC AUTO-ENTMCNC: 32.9 G/DL (ref 31.5–36.5)
MCV RBC AUTO: 95 FL (ref 78–100)
PHOSPHATE SERPL-MCNC: 5.2 MG/DL (ref 2.5–4.5)
PLATELET # BLD AUTO: 196 10E3/UL (ref 150–450)
POTASSIUM SERPL-SCNC: 4 MMOL/L (ref 3.4–5.3)
RBC # BLD AUTO: 3.02 10E6/UL (ref 3.8–5.2)
SODIUM SERPL-SCNC: 130 MMOL/L (ref 135–145)
WBC # BLD AUTO: 5.3 10E3/UL (ref 4–11)

## 2024-06-27 PROCEDURE — 250N000011 HC RX IP 250 OP 636: Mod: JZ | Performed by: INTERNAL MEDICINE

## 2024-06-27 PROCEDURE — G0463 HOSPITAL OUTPT CLINIC VISIT: HCPCS | Mod: 25

## 2024-06-27 PROCEDURE — 94640 AIRWAY INHALATION TREATMENT: CPT

## 2024-06-27 PROCEDURE — 999N000065 XR CHEST PORT 1 VIEW

## 2024-06-27 PROCEDURE — 99232 SBSQ HOSP IP/OBS MODERATE 35: CPT | Mod: 24 | Performed by: INTERNAL MEDICINE

## 2024-06-27 PROCEDURE — 120N000013 HC R&B IMCU

## 2024-06-27 PROCEDURE — 94799 UNLISTED PULMONARY SVC/PX: CPT

## 2024-06-27 PROCEDURE — 250N000009 HC RX 250: Performed by: INTERNAL MEDICINE

## 2024-06-27 PROCEDURE — 99232 SBSQ HOSP IP/OBS MODERATE 35: CPT | Performed by: INTERNAL MEDICINE

## 2024-06-27 PROCEDURE — 999N000157 HC STATISTIC RCP TIME EA 10 MIN

## 2024-06-27 PROCEDURE — 250N000013 HC RX MED GY IP 250 OP 250 PS 637

## 2024-06-27 PROCEDURE — 250N000013 HC RX MED GY IP 250 OP 250 PS 637: Performed by: INTERNAL MEDICINE

## 2024-06-27 PROCEDURE — 85014 HEMATOCRIT: CPT | Performed by: INTERNAL MEDICINE

## 2024-06-27 PROCEDURE — 82374 ASSAY BLOOD CARBON DIOXIDE: CPT | Performed by: INTERNAL MEDICINE

## 2024-06-27 PROCEDURE — 272N000202 HC AEROBIKA WITH MANOMETER

## 2024-06-27 PROCEDURE — 250N000013 HC RX MED GY IP 250 OP 250 PS 637: Performed by: HOSPITALIST

## 2024-06-27 PROCEDURE — 93971 EXTREMITY STUDY: CPT | Mod: RT

## 2024-06-27 PROCEDURE — 94640 AIRWAY INHALATION TREATMENT: CPT | Mod: 76

## 2024-06-27 RX ORDER — BUMETANIDE 0.25 MG/ML
2 INJECTION INTRAMUSCULAR; INTRAVENOUS
Status: DISCONTINUED | OUTPATIENT
Start: 2024-06-27 | End: 2024-06-30

## 2024-06-27 RX ADMIN — BUMETANIDE 2 MG: 0.25 INJECTION INTRAMUSCULAR; INTRAVENOUS at 08:45

## 2024-06-27 RX ADMIN — HYDRALAZINE HYDROCHLORIDE 10 MG: 20 INJECTION INTRAMUSCULAR; INTRAVENOUS at 00:10

## 2024-06-27 RX ADMIN — Medication 250 MG: at 20:35

## 2024-06-27 RX ADMIN — CLOPIDOGREL BISULFATE 75 MG: 75 TABLET ORAL at 08:44

## 2024-06-27 RX ADMIN — HYDROMORPHONE HYDROCHLORIDE 2 MG: 2 TABLET ORAL at 09:02

## 2024-06-27 RX ADMIN — FAMOTIDINE 10 MG: 10 TABLET, FILM COATED ORAL at 08:44

## 2024-06-27 RX ADMIN — BUMETANIDE 2 MG: 0.25 INJECTION, SOLUTION INTRAMUSCULAR; INTRAVENOUS at 15:01

## 2024-06-27 RX ADMIN — NICOTINE 1 PATCH: 14 PATCH, EXTENDED RELEASE TRANSDERMAL at 08:44

## 2024-06-27 RX ADMIN — POTASSIUM CHLORIDE 20 MEQ: 1500 TABLET, EXTENDED RELEASE ORAL at 08:44

## 2024-06-27 RX ADMIN — METHOCARBAMOL 750 MG: 750 TABLET ORAL at 00:13

## 2024-06-27 RX ADMIN — IPRATROPIUM BROMIDE AND ALBUTEROL SULFATE 3 ML: .5; 3 SOLUTION RESPIRATORY (INHALATION) at 12:13

## 2024-06-27 RX ADMIN — HYDROMORPHONE HYDROCHLORIDE 2 MG: 2 TABLET ORAL at 20:32

## 2024-06-27 RX ADMIN — HYDROMORPHONE HYDROCHLORIDE 2 MG: 2 TABLET ORAL at 13:11

## 2024-06-27 RX ADMIN — METHOCARBAMOL 750 MG: 750 TABLET ORAL at 20:35

## 2024-06-27 RX ADMIN — METHOCARBAMOL 750 MG: 750 TABLET ORAL at 09:01

## 2024-06-27 RX ADMIN — IPRATROPIUM BROMIDE AND ALBUTEROL SULFATE 3 ML: .5; 3 SOLUTION RESPIRATORY (INHALATION) at 15:12

## 2024-06-27 RX ADMIN — AMLODIPINE BESYLATE 10 MG: 10 TABLET ORAL at 08:44

## 2024-06-27 RX ADMIN — FLUTICASONE FUROATE AND VILANTEROL TRIFENATATE 1 PUFF: 200; 25 POWDER RESPIRATORY (INHALATION) at 08:52

## 2024-06-27 ASSESSMENT — ACTIVITIES OF DAILY LIVING (ADL)
ADLS_ACUITY_SCORE: 49
ADLS_ACUITY_SCORE: 50
ADLS_ACUITY_SCORE: 46
ADLS_ACUITY_SCORE: 49
ADLS_ACUITY_SCORE: 46
ADLS_ACUITY_SCORE: 46
ADLS_ACUITY_SCORE: 49
ADLS_ACUITY_SCORE: 50
ADLS_ACUITY_SCORE: 50
ADLS_ACUITY_SCORE: 49
ADLS_ACUITY_SCORE: 49
ADLS_ACUITY_SCORE: 50
ADLS_ACUITY_SCORE: 49
ADLS_ACUITY_SCORE: 50
ADLS_ACUITY_SCORE: 49
ADLS_ACUITY_SCORE: 50

## 2024-06-27 NOTE — PLAN OF CARE
Neuro: A/Ox4, follows commands, call light appropriate. Pupils PERRL. No numbness/tingling.    CV: tele     Resp: LS course at times, otherwise clear/diminished. Pt has a productive cough with thick white sputum. Currently utilizing 1L of NC.      GI: BS active. Refused stool softeners- pt noted yesterday she had multiple stools.   She is on renal diet with fluid restriction.     : purewick in place. On bumex, given x2 this shift. UOP remains on the lower end.     Skin: Generalized edema +1, her LLE has +3 edema. Wound on her right AKA site- wound care due tomorrow. WOC following. Small open wound on coccyx- wound care complete.     Activity: lift. T/R.     Additional:   Dressing on old chest tube site was changed this shift,.  PRN oral dilaudid given for pain, as well as robaxin.     Sister updated today.       Problem: Adult Inpatient Plan of Care    Goal: Absence of Hospital-Acquired Illness or Injury  Outcome: Progressing  Intervention: Identify and Manage Fall Risk  Recent Flowsheet Documentation  Taken 6/27/2024 0902 by Stephanie Farris RN  Safety Promotion/Fall Prevention:   safety round/check completed   activity supervised  Intervention: Prevent Skin Injury  Recent Flowsheet Documentation  Taken 6/27/2024 1300 by Stephanie Farris RN  Body Position:   right   turned   upper extremity elevated   lower extremity elevated  Taken 6/27/2024 0902 by Stephanie Farris RN  Body Position:   right   turned   heels elevated   upper extremity elevated  Intervention: Prevent and Manage VTE (Venous Thromboembolism) Risk  Recent Flowsheet Documentation  Taken 6/27/2024 0902 by Stephanie Farris RN  VTE Prevention/Management:   SCDs (sequential compression devices) off   patient refused intervention  Intervention: Prevent Infection  Recent Flowsheet Documentation  Taken 6/27/2024 0902 by Stephanie Farris RN  Infection Prevention:   visitors restricted/screened   single patient room provided   rest/sleep promoted  Goal: Optimal Comfort and  Wellbeing  Outcome: Progressing  Goal: Readiness for Transition of Care  Outcome: Progressing     Problem: ARDS (Acute Respiratory Distress Syndrome)  Goal: Effective Oxygenation  Outcome: Progressing  Intervention: Optimize Oxygenation, Ventilation and Perfusion  Recent Flowsheet Documentation  Taken 6/27/2024 0902 by Stephanie Farris RN  Head of Bed (HOB) Positioning: HOB at 30-45 degrees     Problem: Pain Acute  Goal: Optimal Pain Control and Function  Outcome: Progressing  Intervention: Prevent or Manage Pain  Recent Flowsheet Documentation  Taken 6/27/2024 1200 by Stephanie Farris RN  Sensory Stimulation Regulation:   care clustered   lighting decreased   quiet environment promoted  Taken 6/27/2024 0902 by Stephanie Farris RN  Sensory Stimulation Regulation:   care clustered   lighting decreased   quiet environment promoted  Medication Review/Management: medications reviewed     Problem: Lower Extremity Amputation  Goal: Optimal Adjustment to Amputation  Outcome: Progressing  Goal: Optimal Safe BADL Performance  Outcome: Progressing  Goal: Optimal Safe IADL Performance  Outcome: Progressing  Goal: Effective Lower Limb Care  Outcome: Progressing     Problem: Wound  Goal: Optimal Coping  Outcome: Progressing  Goal: Optimal Functional Ability  Outcome: Progressing  Intervention: Optimize Functional Ability  Recent Flowsheet Documentation  Taken 6/27/2024 0902 by Stephanie Farris RN  Activity Management: activity adjusted per tolerance  Activity Assistance Provided: assistance, 1 person  Goal: Absence of Infection Signs and Symptoms  Outcome: Progressing  Goal: Improved Oral Intake  Outcome: Progressing  Goal: Optimal Pain Control and Function  Outcome: Progressing  Goal: Skin Health and Integrity  Outcome: Progressing  Intervention: Optimize Skin Protection  Recent Flowsheet Documentation  Taken 6/27/2024 0902 by Stephanie Farris RN  Activity Management: activity adjusted per tolerance  Head of Bed (HOB) Positioning: HOB at 30-45  degrees  Goal: Optimal Wound Healing  Outcome: Progressing     Problem: Comorbidity Management  Goal: Maintenance of COPD Symptom Control  Outcome: Progressing  Intervention: Maintain COPD Symptom Control  Recent Flowsheet Documentation  Taken 6/27/2024 0902 by Stephanie Farris RN  Medication Review/Management: medications reviewed  Goal: Maintenance of Heart Failure Symptom Control  Outcome: Progressing  Intervention: Maintain Heart Failure Management  Recent Flowsheet Documentation  Taken 6/27/2024 0902 by Stephanie Farris RN  Medication Review/Management: medications reviewed  Goal: Blood Pressure in Desired Range  Outcome: Progressing  Intervention: Maintain Blood Pressure Management  Recent Flowsheet Documentation  Taken 6/27/2024 0902 by Stephanie Farris RN  Medication Review/Management: medications reviewed
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
## 2024-06-27 NOTE — PROVIDER NOTIFICATION
MD Notification    Notified Person: MD    Notified Person Name: dr. Fletcher   Notification Date/Time: 6/26/2024 1034pm    Notification Interaction: matt     Purpose of Notification: troponin 171    Orders Received: no recheck, aware     Comments:
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
## 2024-06-27 NOTE — CONSULTS
"[facility] Nurse Inpatient Assessment     Consulted for: Pressure Injury coccyx     Summary:   Coccyx: 6/27 very small re-opened area, over the site of a recent prior full-thickness pressure injury which had healed.   WO initially saw pt 6/24 and signed off as coccyx was then intact.    Right AKA: Incidental finding 6/24, woc was following right AKA wound on previous admission.  Stopped wound vac 6/24 and switched to topical dressings due to healed to skin level at portions of the wound base. (This wound not assessed 6/27)    Patient History (according to provider note(s):      \"63 year old female with PMH CAD, MI, peripheral vascular disease status angioplasty and right AKA with wound vac, DLE, COPD, CMT disease, tobacco use, CKD stage III, GERD, hypertension, history of B-cell lymphoma, hyperlipidemia, depression, anxiety, who was admitted on 6/21/2024 with acute hypoxic respiratory failure due to a large left pleural effusion, hyperkalemia, and hyponatremia.                Patient recently admitted to [facility] 3/29-4/22/24 with right limb ischemia in setting of severe PAD ultimately requiring right AKA with complicated post op course (including LIMA requiring HD) after which she was discharged to  [facility]. She was readmitted to [facility] 5/15-5/29/24 with stump eschars requiring surgical debridement and wound VAC placement.                This admission, patient underwent left thoracentesis with 800mL and 1200mL fluid removed. Severe hyperkalemia improved with Lokelma. Patient has been anuric since admission, started on dialysis on 6/23. Chest tube placed 6/23, CT on 6/24 shows limited lung expansion concerning for left mainstem obstruction.\"    Assessment:      Areas visualized during today's visit: Sacrum/coccyx    Pressure injury location: Coccyx     Last photo: 6-27-24 6/24      Wound due to: Pressure Injury per historical charting, appears to have reached Stage 3 " previously  Wound history/plan of care: chart review reveals pt had prior small full-thickness pressure injury, developed during recent prolonged hospitalizations with right AKA.  Wound had healed but has now slightly reopened as of [facility] visit 6/27.  Pt able to roll to her side with light assist x 1.  Mepilex Sacral has been in use.   Wound base: pink moist tissue     Palpation of the wound bed: normal     Drainage: scant     Description of drainage: serosanguinous     Measurements (length x width x depth, in cm): approx 0.3 x 0.3 x 0.2cm      Tunneling: N/A     Undermining: N/A  Periwound skin: Intact      Color: deep pink, blanchable      Temperature: normal   Odor: none  Pain: tender, pain when lying on area  Pain interventions prior to dressing change: patient tolerated well  Treatment goal: Heal  and Protection  STATUS: deteriorating  Supplies ordered: supplies stored on unit and discussed with patient       Wound location: right AKA (not assessed 6/27)    Last photo: 6/24  Wound due to: Surgical Wound  Wound history/plan of care: 6/24  Wound base:  granulation tissue,  slough ~10%     Palpation of the wound bed: normal      Drainage: moderate     Description of drainage: serosanguinous     Measurements (length x width x depth, in cm): largest ~12  x 8  x  0.3 cm, not photographed distal stump with 1cm x 1cm x 0.4cm open area      Tunneling: N/A     Undermining: N/A  Periwound skin: Intact      Color: normal and consistent with surrounding tissue      Temperature: normal   Odor: none  Pain: moderate,  Pain interventions prior to dressing change: soaking, slow and gentle cares , and distraction  Treatment goal: Heal , Drainage control, Infection control/prevention, Remove necrotic tissue, and Soften necrotic tissue  STATUS: initial assessment  Supplies ordered: supplies stored on unit and discussed with patient        Treatment Plan:     Coccyx wound care: every other day and prn:  Cleanse with wound cleanser.  "  Swab periwound area with no-sting skin barrier film, let dry.  Cover with Mepilex Sacral.   PIP measures.  Use ceiling lift to move patient in bed whenever possible, to minimize friction and shear to coccyx/sacrum.       Wound care  Start:  06/24/24 1404,   EVERY OTHER DAY,   Routine      Comments: Location: right stump  Care: provided every other day by primary RN  1. Cleanse every other day with wound cleanser and 4x4\" gauze, pat dry  2. Apply Criticiad paste to wound edges (no need to scrub off all white paste, reapply more)  3. Cover wound base with Aquacel Ag Advantage    4. Add ABD as needed for drainage  5. Secure with stockinette or ace wrap or EdemaWear          Skin care precautions  Start:  06/24/24 1404,   EFFECTIVE NOW,   Routine        Comments: Pressure Injury Prevention (PIP) Plan:  If patient is declining pressure injury prevention interventions: Explore reason why and address patient's concerns, Educate on pressure injury risk and prevention intervention(s), If patient is still declining, document \"informed refusal\" , and Ensure Care team is aware ( provider, charge nurse, etc)  Mattress: Follow bed algorithm, reassess daily and order specialty mattress, if indicated.  HOB: Maintain at or below 30 degrees, unless contraindicated  Repositioning in bed: Every 1-2 hours , Left/right positioning; avoid supine, and Raise foot of bed prior to raising head of bed, to reduce patient sliding down (shear)  Heels: Keep elevated off mattress and Pillows under calves  Protective Dressing: Sacral Mepilex for prevention (#733585),  especially for the agitated patient  Positioning Equipment:None  Chair positioning: Chair cushion (#[id number]) , Assist patient to reposition hourly, and Do NOT use a donut for sitting (this increases pressure to smaller area and creates a higher potential for injury)    If patient has a buttock pressure injury, or high risk for PI use chair cushion or SPS.  Moisture Management: " Perineal cleansing /protection: Follow Incontinence Protocol, Avoid brief in bed, Clean and dry skin folds with bathing , Consider InterDry (#528152) between folds, and Moisturize dry skin  Under Devices: Inspect skin under all medical devices during skin inspection , Ensure tubes are stabilized without tension, and Ensure patient is not lying on medical devices or equipment when repositioned  Ask provider to discontinue device when no longer needed.          Orders: Reviewed and Updated    RECOMMEND PRIMARY TEAM ORDER: None, at this time  Education provided: plan of care  Discussed plan of care with: Patient and Nurse  [facility] nurse follow-up plan:  1-2x week and prn  Notify [facility] if wound(s) deteriorate.  Nursing to notify the Provider(s) and re-consult the [facility] Nurse if new skin concern.    DATA:     Current support surface: Standard  Standard Isoflex gel  Containment of urine/stool: Incontinence Protocol, Incontinent pad in bed, and Suction based external urinary catheter   BMI: Body mass index is 23.74 kg/m .   Active diet order: Orders Placed This Encounter      Combination Diet Regular Diet; Renal Diet (dialysis); 2 gm NA Diet; (1800 ml fr)     Output: I/O last 3 completed shifts:  In: 830 [P.O.:830]  Out: 700 [Urine:700]     Labs:   Recent Labs   Lab 06/27/24  0546 06/21/24  1449 06/21/24  0959   ALBUMIN 2.3*   < >  --    HGB 9.4*   < > 8.6*   WBC 5.3   < > 5.0   A1C  --   --  4.6    < > = values in this interval not displayed.     Pressure injury risk assessment:   Sensory Perception: 4-->no impairment  Moisture: 4-->rarely moist  Activity: 2-->chairfast  Mobility: 3-->slightly limited  Nutrition: 2-->probably inadequate  Friction and Shear: 2-->potential problem  Cesar Score: 17    Catalina Garcia RN CWOCN  -Securely message with [employer] ([facility])  Preferred  -[facility] Office Phone: [phone] (messages checked periodically Mon-Fri 8a-4p)

## 2024-06-27 NOTE — PROGRESS NOTES
[facility]    Pulmonary, Allergy, Critical Care and Sleep Medicine    Follow-up Note  June 27, 2024         Assessment and Plan:   Shirley Hendricks is a 63 year old female who was admitted on 6/21/2024. I was asked to see the patient for left pleural effusion, left mainstem bronchus occlusion.     #Acute hypoxic respiratory failure  This is likely secondary to the combination of underlying volume overload with bilateral pleural effusions, COPD and left mainstem obstruction leading to left lung atelectasis.  # Left greater than right pleural effusion.  S/p thoracentesis and chest tube placement on the left side.  Fluid transudative by lights criteria.    In the setting of anuria, LIMA as well as HFpEF.  Improved with dialysis.  Now having urine output.  On diuresis.  # Left lower lobe collapse with left mainstem bronchus occlusion.  S/p bronchoscopy 6/26 with significant thick, gray, mucoid secretions seen in the left mainstem bronchus.  No endobronchial lesion seen.  Postprocedure CXR shows improvement in the aeration of the left upper lobe.  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  Currently on HD.    Being followed by nephrology.  # Hyperkalemia, hyponatremia, metabolic acidosis.  # HFpEF, volume overload.  # Acute on chronic anemia.  # HTN, HL, CAD.  # History of PVD, with recent AKA on the right with wound debridement subsequently.  # COPD.  # Tobacco use.     - Patient not being able to tolerate conservative management for pulmonary hygiene including persistent nausea.  - Improvement in the aeration of the left upper lobe post bronchoscopy.  - Explained to the patient to continue incentive spirometer and flutter valve use to prevent mucous plugging again.  - Added PT/OT consult.  - Still has moderate amount of left-sided pleural effusion.  Was net positive in the last 24 hours.  She does have increasing urine output.  Might benefit from increasing her diuretic regimen.  Will defer to nephrology who  has been following her.  - As for her pleural effusion, fluid appears to be transudative on both the pleural fluid studies by lights criteria.  - Chest tube removed yesterday.  - We will discontinue the Mucomyst nebulizers as well as chest PT as patient is not being able to tolerate it.    - Follow-up bronchoscopy cultures.  - Currently, have low suspicion of infection.  But patient is at high risk of developing a pneumonia.  Continue to monitor closely for signs and symptoms of infection.  Low threshold to initiate antibiotics.  - Rest of the plan as per the primary team.    Pulmonary will sign off.  Please feel free to reconsult if her clinical condition changes.     BURKE Gandhi   of Medicine  [facility]  Pulmonary, Allergy, Critical Care and Sleep Medicine  Pager - [phone]              Interval History:     - Bronchoscopy done with therapeutic aspiration of thick, gray, mucoid secretions mainly from the left mainstem bronchus.  - Postprocedure x-ray shows improvement in aeration of the left upper lobe.  - Had a rapid response called yesterday evening with chest pain in the left lateral area.  Improved with pain control.  No increasing oxygen requirements.  - Chest tube removed by IR yesterday as well.  - This morning CXR shows persistent atelectasis of the left lower lobe with effusion present as well.  - Continues to be hypertensive with -170s.  - On 2 LPM with O2 sats 96% in the morning.  - Improving urine output but was net positive over the last 24 hours.           Review of Systems:   C: negative for fever, chills, change in weight  INTEGUMENTARY/SKIN: no rash or obvious new lesions  ENT/MOUTH: no sore throat, new sinus pain or nasal drainage  RESP: see interval history  CV: negative for chest pain, palpitations or peripheral edema  GI: no nausea, vomiting, change in stools  MUSCULOSKELETAL: no myalgias, arthralgias          Medications:     Current  Facility-Administered Medications   Medication Dose Route Frequency Provider Last Rate Last Admin    - MEDICATION INSTRUCTIONS for Dialysis Patients -   Does not apply See Admin Instructions Love Gross, [facility]        amLODIPine (NORVASC) tablet 10 mg  10 mg Oral Daily Devin Ravi MD   10 mg at 06/27/24 0844    bumetanide (BUMEX) injection 2 mg  2 mg Intravenous Daily Hardy Falcon MD   2 mg at 06/27/24 0845    clopidogrel (PLAVIX) tablet 75 mg  75 mg Oral Daily Devin Ravi MD   75 mg at 06/27/24 0844    famotidine (PEPCID) tablet 10 mg  10 mg Oral Daily Devin Ravi MD   10 mg at 06/27/24 0844    fluticasone-vilanterol (BREO ELLIPTA) 200-25 MCG/ACT inhaler 1 puff  1 puff Inhalation Daily Bruce Ulloa MD   1 puff at 06/25/24 0910    sodium chloride 0.9% DIALYSIS Cath LOCK - BLUE Lumen  1.3-2.6 mL Intracatheter Once Leonor Salomon DO        Followed by    heparin 1000 unit/mL DIALYSIS Cath LOCK - BLUE Lumen  3 mL Intracatheter Once Leonor Salomon DO        sodium chloride 0.9% DIALYSIS Cath LOCK - RED Lumen  1.3-2.6 mL Intracatheter Once Leonor Salomon DO        Followed by    heparin 1000 unit/mL DIALYSIS Cath LOCK - RED Lumen  3 mL Intracatheter Once Leonor Salomon DO        HYDROmorphone (PF) (DILAUDID) injection 0.3 mg  0.3 mg Intravenous Once Devin Ravi MD        ipratropium - albuterol 0.5 mg/2.5 mg/3 mL (DUONEB) neb solution 3 mL  3 mL Nebulization 4x daily Rod Nath MD   3 mL at 06/25/24 1549    nicotine (NICODERM CQ) 14 MG/24HR 24 hr patch 1 patch  1 patch Transdermal Daily Bruce Ulloa MD   1 patch at 06/27/24 0844    polyethylene glycol (MIRALAX) Packet 17 g  17 g Oral Daily Bruce Ulloa MD   17 g at 06/25/24 0906    potassium chloride masha ER (KLOR-CON M20) CR tablet 20 mEq  20 mEq Oral BID Devin Ravi MD   20 mEq at 06/27/24 0844    [Held by provider] rosuvastatin (CRESTOR) tablet  10 mg  10 mg Oral At Bedtime Bruce Ulloa MD   10 mg at 06/22/24 2159    saccharomyces boulardii (FLORASTOR) capsule 250 mg  250 mg Oral BID Bruce Ulloa MD   250 mg at 06/27/24 0845    sodium chloride (PF) 0.9% PF flush 3 mL  3 mL Intracatheter Q8H Bruce Ulloa MD   3 mL at 06/27/24 0849     Current Facility-Administered Medications   Medication Dose Route Frequency Provider Last Rate Last Admin    acetaminophen (TYLENOL) tablet 650 mg  650 mg Oral Q4H PRN Ann Araya MD   650 mg at 06/26/24 1551    Or    acetaminophen (TYLENOL) Suppository 650 mg  650 mg Rectal Q4H PRN Ann Araya MD        calcium carbonate (TUMS) chewable tablet 1,000 mg  1,000 mg Oral 4x Daily PRN Bruce Ulloa MD   1,000 mg at 06/22/24 1823    carboxymethylcellulose PF (REFRESH PLUS) 0.5 % ophthalmic solution 1 drop  1 drop Both Eyes Q1H PRN Devin Ravi MD        glucose gel 15-30 g  15-30 g Oral Q15 Min PRN Kiana Joseph MD        Or    dextrose 50 % injection 25-50 mL  25-50 mL Intravenous Q15 Min PRN Kiana Joseph MD        Or    glucagon injection 1 mg  1 mg Subcutaneous Q15 Min PRN Kiana Joseph MD        guaiFENesin (MUCINEX) 12 hr tablet 600 mg  600 mg Oral BID PRN Ann Araya MD   600 mg at 06/23/24 0830    hydrALAZINE (APRESOLINE) injection 10 mg  10 mg Intravenous Q4H PRN Devin Ravi MD   10 mg at 06/27/24 0010    HYDROmorphone (DILAUDID) tablet 2 mg  2 mg Oral Q3H PRN Devin Ravi MD   2 mg at 06/26/24 1551    HYDROmorphone (DILAUDID) tablet 2 mg  2 mg Oral Q3H PRN Devin Ravi MD   2 mg at 06/26/24 2144    methocarbamol (ROBAXIN) tablet 750 mg  750 mg Oral TID PRN Soumya Ba, NP   750 mg at 06/27/24 0013    naloxone (NARCAN) injection 0.2 mg  0.2 mg Intravenous Q2 Min PRN Bruce Ulloa MD        Or    naloxone (NARCAN) injection 0.4 mg  0.4 mg Intravenous Q2 Min PRN Bruce Ulloa MD        Or    naloxone (NARCAN)  injection 0.2 mg  0.2 mg Intramuscular Q2 Min PRN Bruce Ulloa MD        Or    naloxone (NARCAN) injection 0.4 mg  0.4 mg Intramuscular Q2 Min PRN Bruce Ulloa MD        nitroGLYcerin (NITROSTAT) sublingual tablet 0.4 mg  0.4 mg Sublingual Q5 Min PRN Devin Ravi MD        No lozenges or gum should be given while patient on BIPAP/AVAPS/AVAPS AE   Does not apply Continuous PRN Devin Ravi MD        ondansetron (ZOFRAN ODT) ODT tab 4 mg  4 mg Oral Q6H PRN Bruce Ulloa MD   4 mg at 06/25/24 1622    Or    ondansetron (ZOFRAN) injection 4 mg  4 mg Intravenous Q6H PRN Bruce Ulloa MD   4 mg at 06/24/24 2029    Patient may continue current oral medications   Does not apply Continuous PRN Devin Ravi MD        prochlorperazine (COMPAZINE) injection 5 mg  5 mg Intravenous Q6H PRN Dio Fletcher MD   5 mg at 06/24/24 2143    Or    prochlorperazine (COMPAZINE) tablet 5 mg  5 mg Oral Q6H PRN Dio Fletcher MD        Or    prochlorperazine (COMPAZINE) suppository 12.5 mg  12.5 mg Rectal Q12H PRN Dio Fletcher MD        senna-docusate (SENOKOT-S/PERICOLACE) 8.6-50 MG per tablet 1 tablet  1 tablet Oral BID PRN Bruce Ulloa MD        Or    senna-docusate (SENOKOT-S/PERICOLACE) 8.6-50 MG per tablet 2 tablet  2 tablet Oral BID PRN Bruce Ulloa MD        sodium chloride (NEBUSAL) 3 % neb solution 3 mL  3 mL Nebulization Q3H PRN Rod Nath MD   3 mL at 06/25/24 1549    sodium chloride (PF) 0.9% PF flush 3 mL  3 mL Intracatheter q1 min prn Bruce Ulloa MD                Physical Exam:   Temp:  [97.8  F (36.6  C)-98.7  F (37.1  C)] 98.6  F (37  C)  Pulse:  [58-92] 63  Resp:  [10-24] 15  BP: (117-192)/() 166/81  SpO2:  [86 %-98 %] 91 %    Intake/Output Summary (Last 24 hours) at 6/27/2024 0850  Last data filed at 6/27/2024 0603  Gross per 24 hour   Intake 830 ml   Output 600 ml   Net 230 ml     Constitutional:   Awake, alert and in no apparent distress     Eyes:    nonicteric     ENT:    oral mucosa moist without lesions     Neck:   Supple without supraclavicular or cervical lymphadenopathy     Lungs:   Good air flow.  No crackles. No rhonchi.  No wheezes.     Cardiovascular:   Normal S1 and S2.  RRR.  No murmur, gallop or rub.     Abdomen:   NABS, soft, nontender, nondistended.  No HSM.     Musculoskeletal:   No edema     Neurologic:   Alert and conversant.     Skin:   Warm, dry.  No rash on limited exam.             Data:   All laboratory and imaging data reviewed.    CMP  Recent Labs   Lab 06/27/24  0546 06/26/24  0621 06/25/24  0544 06/24/24  0633 06/23/24  0609 06/22/24  1513 06/22/24  0541 06/21/24  1802 06/21/24  1749 06/21/24  1610 06/21/24  1449   * 133* 136 133* 133*  --  128*  --  128*  --  129*   POTASSIUM 4.0 3.0* 3.2* 3.8 5.3   < > 7.1*   < > 6.4*   < > 6.8*   CHLORIDE 94* 97* 99 97* 100  --  99  --  99  --  99   CO2 27 28 28 25 19*  --  16*  --  17*  --  18*   ANIONGAP 9 8 9 11 14  --  13  --  12  --  12   GLC 76 86 83 88 76   < > 84   < > 114*   < > 116*   BUN 37.9* 29.8* 20.6 50.8* 90.3*  --  90.1*  --  90.3*  --  87.4*   CR 2.76* 2.26* 1.89* 2.84* 3.74*  --  3.16*  --  3.07*  --  3.01*   GFRESTIMATED 18* 23* 29* 18* 13*  --  16*  --  16*  --  17*   SONIA 7.6* 7.7* 7.6* 7.3* 7.3*  --  7.8*  --  8.5*  --  7.7*   MAG  --   --   --   --   --   --  1.8  --   --   --   --    PHOS 5.2* 4.9* 4.2  --   --   --   --   --   --   --   --    PROTTOTAL  --   --   --   --  4.5*  --   --   --  5.6*  --  5.2*   ALBUMIN 2.3* 2.4* 2.4*  --  2.6*  --   --   --   --   --  2.4*   BILITOTAL  --   --   --   --  <0.2  --   --   --   --   --  <0.2   ALKPHOS  --   --   --   --  43  --   --   --   --   --  63   AST  --   --   --   --  15  --   --   --   --   --  27   ALT  --   --   --   --  18  --   --   --   --   --  28    < > = values in this interval not displayed.     CBC  Recent Labs   Lab 06/27/24  0546 06/26/24  1637 06/26/24  0621 06/25/24  0544 06/24/24  0633   WBC 5.3  --   "6.0 4.6 5.6   RBC 3.02*  --  2.93* 2.92* 3.06*   HGB 9.4* 9.8* 9.2* 9.0* 9.5*  9.5*   HCT 28.6*  --  27.5* 28.0* 29.2*   MCV 95  --  94 96 95   MCH 31.1  --  31.4 30.8 31.0   MCHC 32.9  --  33.5 32.1 32.5   RDW 14.3  --  14.6 15.0 15.7*     --  187 184 201     INRNo lab results found in last 7 days.  Arterial Blood GasNo lab results found in last 7 days.  Urine Studies    Recent Labs   Lab Test 05/03/24  1319 02/04/19  0935 02/01/19  1118   URINEPH 6.0 6.0 5.0   NITRITE Negative Negative Negative   LEUKEST Large* Negative Negative   WBCU >182* 0 - 5 1     CMV viral loads  No lab results found.  No results found for: \"CMQNT\", \"CMVQ\"  EBV viral loads   No lab results found.  No results found for: \"EBVDN\", \"EBRES\", \"EBVSP\", \"EBVPC\", \"EBVPCR\"  Respiratory Virus Testing    No results found for: \"RS\", \"FLUAG\"  Sputum Culture last 3 months:  Specimen Description   Date Value Ref Range Status   09/22/2009 Left Groin  Final   08/21/2006 Throat  Final   08/21/2006 Throat  Final    Culture Micro   Date Value Ref Range Status   09/22/2009 Heavy growth Staphylococcus aureus  Final     Comment:     Faxed final report to [phone] on 9.26.09 / AA 08/21/2006 No Beta Streptococcus isolated  Final   01/10/2004 No Beta Streptococcus isolated  Final        Attestation:    I personally spent 45 minutes on the date of the encounter doing chart review, history and exam, documentation and further activities per the note.     BURKE Gandhi   of Medicine  [facility]  Pulmonary, Allergy, Critical Care and Sleep Medicine  Pager - [phone]      "

## 2024-06-27 NOTE — PROGRESS NOTES
[facility]    Hospitalist Progress Note    Interval History   - Doing better today, left lung reexpanding  - Making urine but Creatinine climbing up  - RUE US no DVT  - Discussed with Pulm    Assessment & Plan   Summary: Shirley Hendricks is a 63 year old female with PMH CAD, MI, peripheral vascular disease status angioplasty and right AKA with wound vac, DLE, COPD, CMT disease, tobacco use, CKD stage III, GERD, hypertension, history of B-cell lymphoma, hyperlipidemia, depression, anxiety, who was admitted on 6/21/2024 with acute hypoxic respiratory failure due to a large left pleural effusion, hyperkalemia, and hyponatremia.   Patient recently admitted to [facility] 3/29-4/22/24 with right limb ischemia in setting of severe PAD ultimately requiring right AKA with complicated post op course (including LIMA requiring HD) after which she was discharged to  [facility]. She was readmitted to [facility] 5/15-5/29/24 with stump eschars requiring surgical debridement and wound VAC placement.   This admission, patient underwent left thoracentesis with 800mL and 1200mL fluid removed. Severe hyperkalemia improved with Lokelma. Patient has been anuric since admission, started on dialysis on 6/23. Chest tube placed 6/23, CT on 6/24 shows ongoing left bronchus obstruction, felt to be mucous plugging per Pulm. Improved after dialysis 6/24, now making urine, O2 needs down to 2-3L.   S/p bronchoscopy 6/26 with mucous plugging removed. Chest tube to be removed 6/26. Lung re-expansion noted 6/26.     Left mainstem bronchial obstruction due to mucous plugging s/p bronchoscopy 6/26/2024  Acute hypoxic respiratory failure due to above, improved  Large left pleural effusion, transudative  S/p left thoracentesis 800mL 6/21, 1200mL 6/22  S/p left chest tube 6/23/2024  Right moderate pleural effusion  Patient with increasing shortness of breath for 1-2 weeks PTA. CXR 6/21 in ED reveals complete opacification of  the left hemithorax. CT chest-large left pleural effusion with complete collapse of the left upper and left lower and occlusion of left mainstem bronchus and a moderate size right pleural effusion. Patient underwent thoracentesis 800mL on 6/21, fluid appears transudative. Patient appears markedly volume overloaded. In the ED she initially required 2L O2 but was transitioned to HFNC overnight 6/22 to 6/23 during RRT.  Reports symptomatic improvement with thoracentesis 6/21 and 6/22, but continues to require 10+L O2 or HFNC on 6/23. Discussed with Pulm on 6/23, concern remains left mainstem bronchus obstruction but need to rule out recurrent pleural effusion as cause, so chest tube placed on 6/23. CT chest on 6/24 shows persistent persistent left bronchial obstruction, per Pulm likely mucous plugging.  Bronchoscopy done 6/26 with aspiration of mucous plugs. Chest tube removed 6/26. Patient with chest pain due to lung re-expansion 6/26, CXR 6/27 shows improving lung expansion.  - Pleural fluid cytology pending  - Pulmonology consulted and appreciated    Anuric renal failure on CKD  Recent acute renal failure in May 2024 needing hemodialysis  Severe hyperkalemia, improved  Hyponatremia  Metabolic acidosis  Recent baseline creatinine has been fluctuating between 1.9-2.8. On admission Creatinine is 3.07, potassium is 7.5, sodium of 128 with bicarb of 17.   In the ER patient did receive albuterol, 2 g calcium gluconate, insulin, Kayexalate 10 g and 40 IV Lasix and 1 L IV fluid bolus. EKG showed sinus bradycardia with prolonged QT. Potassium has been elevated to 7.1 since admission, improved to 5.3 on 6/23 with Lokelma.  Patient has been anuric since admission, no urine output. Etiology of anuric renal failure likely related to left mainstem bronchus obstruction. Due to ongoing anuria, TDC placed 6/23 and started on dialysis.  Started to making urine 6/25, however creatinine continues to rise.  - Appreciate Nephrology  consult  - Dialysis PRN  - Bumex    Possible acute diastolic congestive heart failure exacerbation  Elevated troponin suspect due to volume overload  Patient presents with large left pleural effusion, BNP  elevated to 11.9k. Note history of stress cardiomyopathy in Oct 2023 with EF 30-35% which then resolved on echo Nov 2023.    Patient does not have any significant chest discomfort and troponin are 174 and 177 and trending down to 156. Echo 6/22 shows Ef 60-65%, mid anterior and lateral severe hypokinessis, distal inferior hypokinesis, normal RV function. Suspect large component of renal failure contributing. Felt less likely to be CHF as this was likely due to renal failure.   - Cardiology consult appreciated  - HD per Nephrology, see above  - Tele, weights, I&O    Systolic murmur: Noted systolic murmur 6/23, limited echo 6/24 shows no change from 6/22, likely flow murmur.    Anemia of chronic disease  Acute anemia 6/23 likely spurious  Recent baseline hemoglobin has been fluctuating and has been between 8.4-10.1.   Hemoglobin 8.4 on 6/22-->6.4 on 6/23. Patient denies blood loss. Bilirubin is not elevated makes hemolysis less likely. Patient was given 1u pRBC, recheck Hgb was 9.5 via skin draw. The Hgb of 6.4 likely was spurious related to an inaccurate PICC line draw, likely Hgb was 8.5-->9.5 with 1u pRBC.  - [facility] consult appreciated, signed off  - Daily Hgb checks    Hypertension  Hyperlipidemia  History of coronary disease with an MI in 2019  Left heart cath on 10/4/2023 showed-completely occluded D1 with left to left collaterals from distal LAD to D1, moderate CAD involving proximal to mid RCA not significant by IFR and moderate coronary disease please involving distal small caliber RPL and distal circumflex artery. Last echo 11/23 showed EF of 55 to 60% with normal RV and moderate trileaflet aortic sclerosis  - Hold Coreg for now  - Continue amlodipine 10mg daily    History of peripheral vascular disease  status above-knee amputation on the right with wound dressing status washout on   Neuropathic pain  Patient has followed with vascular surgery and has noticed during last hospitalization in May 2024 she underwent washout with wound VAC placement and was discharged on Plavix and Crestor. On exam the amputation stump does not look infected and has wound vac in place   - Continue with PTA Plavix 75 mg daily  - Continues with wound vac, vascular surgery consult appreciated  - Pain control with hydromorphone oral PRN (avoid oxycodone as it is renally cleared)    COPD not in exacerbation  - On exam has no wheezing and continue with as needed symbicort    GERD: Famotidine BID    History of B-cell lymphoma  - Follows with [place] [facility]  - Pending cytology from thoracentesis     Tobacco use  - Continue with nicotine patch    Ongoing stressors  Patient had a recent AKA in 2024 with complicated post op course 2024, and reports   2024.    Clinically Significant Risk Factors        # Hypokalemia: Lowest K = 3 mmol/L in last 2 days, will replace as needed       # Hypoalbuminemia: Lowest albumin = 2.3 g/dL at 2024  5:46 AM, will monitor as appropriate     # Hypertension: Noted on problem list             #Precipitous drop in Hgb/Hct: Lowest Hgb this hospitalization: 6.4 g/dL. Will continue to monitor and treat/transfuse as appropriate.           # Financial/Environmental Concerns: none          PT/OT: ordered  Diet: Fluid restriction 1800 ML FLUID  Combination Diet Regular Diet; Renal Diet (dialysis); 2 gm NA Diet; (1800 ml fr)    DVT Prophylaxis: none  Alvarez Catheter: Not present  Lines: PRESENT      PICC 24 Double Lumen Left Brachial vein medial access-Site Assessment: WDL  CVC Right Subclavian-Site Assessment: WDL  CVC Double Lumen Right Subclavian Tunneled-Site Assessment: WDL    Cardiac Monitoring: ACTIVE order. Indication: Chest pain/ ACS rule out (24 hours)  Code Status:  Full Code    Medically Ready for Discharge: Anticipated in 2-4 Days, needs ongoing inpatient renal management      Devin Ravi MD  Hospitalist Service  [facility]  Securely message with [employer] (more info)  Text page via [employer] Paging/Directory     Data reviewed today: I reviewed all new labs and imaging results over the last 24 hours.    Physical Exam   Temp: 98.6  F (37  C) Temp src: Oral BP: (!) 164/75 Pulse: 65   Resp: 18 SpO2: 93 % O2 Device: Nasal cannula Oxygen Delivery: 1 LPM  Vitals:    06/24/24 0605 06/26/24 0623 06/27/24 0557   Weight: 58.3 kg (128 lb 8.5 oz) 57.3 kg (126 lb 5.2 oz) 57 kg (125 lb 10.6 oz)     Vital Signs with Ranges  Temp:  [97.8  F (36.6  C)-98.7  F (37.1  C)] 98.6  F (37  C)  Pulse:  [58-76] 65  Resp:  [10-24] 18  BP: (121-185)/() 164/75  SpO2:  [91 %-98 %] 93 %  I/O last 3 completed shifts:  In: 830 [P.O.:830]  Out: 700 [Urine:700]  O2 requirements: Yes HFNC    Constitutional: Female in NAD  HEENT: Eyes nonicteric, oral mucosa moist  Cardiovascular: RRR, normal S1/2, no m/r/g  Respiratory: Noted breath sounds NOMAN, normal R breath sounds  Vascular: Anasarca with UE and LLE pitting edema, note R AKA with wound vac  GI: Normoactive bowel sounds, nontender  Skin/Integumen: No rashes  Neuro/Psych: Appropriate affect and mood. A&Ox3, moves all extremities    Medications   Current Facility-Administered Medications   Medication Dose Route Frequency Provider Last Rate Last Admin    No lozenges or gum should be given while patient on BIPAP/AVAPS/AVAPS AE   Does not apply Continuous PRN Devin Ravi MD        Patient may continue current oral medications   Does not apply Continuous PRN Devin Ravi MD         Current Facility-Administered Medications   Medication Dose Route Frequency Provider Last Rate Last Admin    - MEDICATION INSTRUCTIONS for Dialysis Patients -   Does not apply See Admin Instructions Love Gross RP         amLODIPine (NORVASC) tablet 10 mg  10 mg Oral Daily Devin Ravi MD   10 mg at 06/27/24 0844    bumetanide (BUMEX) injection 2 mg  2 mg Intravenous Daily Hardy Falcon MD   2 mg at 06/27/24 0845    clopidogrel (PLAVIX) tablet 75 mg  75 mg Oral Daily Devin Ravi MD   75 mg at 06/27/24 0844    famotidine (PEPCID) tablet 10 mg  10 mg Oral Daily Devin Ravi MD   10 mg at 06/27/24 0844    fluticasone-vilanterol (BREO ELLIPTA) 200-25 MCG/ACT inhaler 1 puff  1 puff Inhalation Daily Bruce Ulloa MD   1 puff at 06/27/24 0852    sodium chloride 0.9% DIALYSIS Cath LOCK - BLUE Lumen  1.3-2.6 mL Intracatheter Once Leonor Salomon DO        Followed by    heparin 1000 unit/mL DIALYSIS Cath LOCK - BLUE Lumen  3 mL Intracatheter Once Leonor Salomon DO        sodium chloride 0.9% DIALYSIS Cath LOCK - RED Lumen  1.3-2.6 mL Intracatheter Once Leonor Salomon DO        Followed by    heparin 1000 unit/mL DIALYSIS Cath LOCK - RED Lumen  3 mL Intracatheter Once Leonor Salomon DO        HYDROmorphone (PF) (DILAUDID) injection 0.3 mg  0.3 mg Intravenous Once Devin Ravi MD        ipratropium - albuterol 0.5 mg/2.5 mg/3 mL (DUONEB) neb solution 3 mL  3 mL Nebulization 4x daily Rod Nath MD   3 mL at 06/25/24 1549    nicotine (NICODERM CQ) 14 MG/24HR 24 hr patch 1 patch  1 patch Transdermal Daily Bruce Ulloa MD   1 patch at 06/27/24 0844    polyethylene glycol (MIRALAX) Packet 17 g  17 g Oral Daily Bruce Ulloa MD   17 g at 06/25/24 0906    potassium chloride masha ER (KLOR-CON M20) CR tablet 20 mEq  20 mEq Oral BID Devin Ravi MD   20 mEq at 06/27/24 0844    [Held by provider] rosuvastatin (CRESTOR) tablet 10 mg  10 mg Oral At Bedtime Bruce Ulloa MD   10 mg at 06/22/24 2159    saccharomyces boulardii (FLORASTOR) capsule 250 mg  250 mg Oral BID Bruce Ulloa MD   250 mg at 06/26/24 2034    sodium chloride (PF) 0.9% PF flush  3 mL  3 mL Intracatheter Q8H Bruce Ulloa MD   3 mL at 06/27/24 0849       Data   Recent Labs   Lab 06/27/24  0546 06/26/24  1637 06/26/24  0621 06/25/24  0544 06/23/24  0849 06/23/24  0609 06/21/24  1802 06/21/24  1749 06/21/24  1610 06/21/24  1449   WBC 5.3  --  6.0 4.6   < > 6.1   < >  --   --  4.7   HGB 9.4* 9.8* 9.2* 9.0*   < > 6.4*   < >  --   --  8.4*   MCV 95  --  94 96   < > 98   < >  --   --  97     --  187 184   < > 210   < >  --   --  248   *  --  133* 136   < > 133*   < > 128*  --  129*   POTASSIUM 4.0  --  3.0* 3.2*   < > 5.3   < > 6.4*   < > 6.8*   CHLORIDE 94*  --  97* 99   < > 100   < > 99  --  99   CO2 27  --  28 28   < > 19*   < > 17*  --  18*   BUN 37.9*  --  29.8* 20.6   < > 90.3*   < > 90.3*  --  87.4*   CR 2.76*  --  2.26* 1.89*   < > 3.74*   < > 3.07*  --  3.01*   ANIONGAP 9  --  8 9   < > 14   < > 12  --  12   SONIA 7.6*  --  7.7* 7.6*   < > 7.3*   < > 8.5*  --  7.7*   GLC 76  --  86 83   < > 76   < > 114*   < > 116*   ALBUMIN 2.3*  --  2.4* 2.4*  --  2.6*  --   --   --  2.4*   PROTTOTAL  --   --   --   --   --  4.5*  --  5.6*  --  5.2*   BILITOTAL  --   --   --   --   --  <0.2  --   --   --  <0.2   ALKPHOS  --   --   --   --   --  43  --   --   --  63   ALT  --   --   --   --   --  18  --   --   --  28   AST  --   --   --   --   --  15  --   --   --  27    < > = values in this interval not displayed.       Imaging:   Recent Results (from the past 24 hour(s))   XR Chest Port 1 View    Narrative    CHEST PORTABLE ONE VIEW   6/26/2024 1:15 PM     HISTORY: Post bronchoscopy.    COMPARISON: 6/26/2024.      Impression    IMPRESSION: Improved aeration of the left upper to midlung. Persistent  consolidation at the left mid to low lung. Cardiac silhouette is  obscured. Right lung shows persistent ill-defined opacity along the  medial right lung base. No pneumothorax identified. Left pleural fluid  appears moderate. Right chest dual lumen venous catheter tip is stable  at the low SVC.  Stable left PICC line tip at the cavoatrial level.     SANTANA CHAIDEZ MD         SYSTEM ID:  [id number]   XR Chest Port 1 View    Narrative    EXAM: XR CHEST PORT 1 VIEW  LOCATION: [facility]  DATE: 6/26/2024    INDICATION: chest pain  COMPARISON: Earlier today at 1313 hours      Impression    IMPRESSION: No new abnormality. Interval removal of left pigtail pleural catheter. Improved aeration left lung with some decrease in left pleural effusion, likely modest pleural effusion and consolidation at left base persists but no new infiltrate.   Right hemithorax unchanged with tiny pleural effusion. Heart size and pulmonary vessels normal. Right IJ dialysis catheter and left-sided PICC line remain in good position.   XR Chest Port 1 View    Narrative    EXAM: XR CHEST PORT 1 VIEW  LOCATION: [facility]  DATE: 6/27/2024    INDICATION: pleural effusion  COMPARISON: 6/26/2024      Impression    IMPRESSION: Interval decrease in left basilar pleural fluid with still moderate pleural fluid and atelectasis remaining left lung base. Minimal right basilar pleural fluid and atelectasis. Cardiac enlargement. Pulmonary vascularity normal. Aortic   calcification. Right-sided central venous catheter with distal tip at cavoatrial junction.   US Upper Extremity Venous Duplex Right    Narrative    US UPPER EXTREMITY VENOUS DUPLEX RIGHT  6/27/2024 10:09 AM     HISTORY: , right arm redness, swelling and pain at fore arm tattoo  site    COMPARISON: None.    TECHNIQUE: Examination of the upper extremity veins was performed with  graded compression and 2-D ultrasound and color doppler spectral  waveform analysis.     FINDINGS:  There is no evidence of thrombosis of the axillary,  brachial, basilic or cephalic veins.  The veins in the forearm show no  evidence of thrombosis. There is edema in the right forearm.      Impression    IMPRESSION: No evidence for DVT in the right upper  jameson BENITEZ MD         SYSTEM ID:  [id number]

## 2024-06-27 NOTE — PROGRESS NOTES
[facility]     Renal Progress Note       SHORTHAND KEY FOR MY NOTES:  c = with, s = without, p = after, a = before, x = except, asx = asymptomatic, tx = transplant or treatment, sx = symptoms or symptomatic, cx = canceled or culture, rxn = reaction, yday = yesterday, nl = normal, abx = antibiotics, fxn = function, dx = diagnosis, dz = disease, m/h = melena/hematochezia, c/d/l/ha = cramping/dizziness/lightheadedness/headache, d/c = discharge or diarrhea/constipation, f/c/n/v = fevers/chills/nausea/vomiting, cp/sob = chest pain/shortness of breath, tbv = total body volume, rxn = reaction, tdc = tunneled dialysis catheter, pta = prior to admission, hd = hemodialysis, pd = peritoneal dialysis, hhd = home hemodialysis, edw = estimated dry wt         Assessment/Plan:     1.  LIMA/CKD IV.  Pt's cr continues to rise a bit, but she is not having any uremic sx.  She is TBV up from hypoalbuminemic third-spacing and is responding a bit to diuretics.  No need for HD, yet.  A.  Increase bumet to 2 mg iv bid.  B.  Follow labs, uo, sx daily.  C.  Avoid nephrotoxics.  D.  Strict I/Os.    2.  Pleuritic L cp.  This is a bit better.  Still has some effusions.  Hopefully, the increased diuretics help.  A.  Follow clinically.  B.  Supp o2 prn.    3.  Anemia. Hb is stable in the 9s.    A.  Follow hb, clinically.    4.  FEN.  K is up to 4 today p replacement.  She is on scheduled KCl but given the worsening renal fxn, will stop that.  A.  Discontinue KCl.  B.  Follow electrolytes daily.    Case d/w Stephanie Urena RN.        Interval History:     Pt is feeling better today.  Her pleuritic cp has improved, but is still present.  Urinating ok.  No sob/orthopnea.            Medications and Allergies:     Current Facility-Administered Medications   Medication Dose Route Frequency Provider Last Rate Last Admin    - MEDICATION INSTRUCTIONS for Dialysis Patients -   Does not apply See Admin Instructions Renato  [facility]        amLODIPine (NORVASC) tablet 10 mg  10 mg Oral Daily Devin Ravi MD   10 mg at 06/27/24 0844    bumetanide (BUMEX) injection 2 mg  2 mg Intravenous bid 08 & 14 Hardy Falcon MD        clopidogrel (PLAVIX) tablet 75 mg  75 mg Oral Daily Devin Ravi MD   75 mg at 06/27/24 0844    famotidine (PEPCID) tablet 10 mg  10 mg Oral Daily Devin Ravi MD   10 mg at 06/27/24 0844    fluticasone-vilanterol (BREO ELLIPTA) 200-25 MCG/ACT inhaler 1 puff  1 puff Inhalation Daily Bruce Ulloa MD   1 puff at 06/27/24 0852    sodium chloride 0.9% DIALYSIS Cath LOCK - BLUE Lumen  1.3-2.6 mL Intracatheter Once Leonor Salomon DO        Followed by    heparin 1000 unit/mL DIALYSIS Cath LOCK - BLUE Lumen  3 mL Intracatheter Once Leonor Salomon DO        sodium chloride 0.9% DIALYSIS Cath LOCK - RED Lumen  1.3-2.6 mL Intracatheter Once Leonor Salomon DO        Followed by    heparin 1000 unit/mL DIALYSIS Cath LOCK - RED Lumen  3 mL Intracatheter Once Leonor Salomon DO        HYDROmorphone (PF) (DILAUDID) injection 0.3 mg  0.3 mg Intravenous Once Devin Ravi MD        ipratropium - albuterol 0.5 mg/2.5 mg/3 mL (DUONEB) neb solution 3 mL  3 mL Nebulization 4x daily Rod Nath MD   3 mL at 06/25/24 1549    nicotine (NICODERM CQ) 14 MG/24HR 24 hr patch 1 patch  1 patch Transdermal Daily Bruce Ulloa MD   1 patch at 06/27/24 0844    polyethylene glycol (MIRALAX) Packet 17 g  17 g Oral Daily Bruce Ulloa MD   17 g at 06/25/24 0906    [Held by provider] rosuvastatin (CRESTOR) tablet 10 mg  10 mg Oral At Bedtime Bruce Ulloa MD   10 mg at 06/22/24 2159    saccharomyces boulardii (FLORASTOR) capsule 250 mg  250 mg Oral BID Bruce Ulloa MD   250 mg at 06/26/24 2034    sodium chloride (PF) 0.9% PF flush 3 mL  3 mL Intracatheter Q8H Bruce Ulloa MD   3 mL at 06/27/24 0826     Allergies   Allergen Reactions    Contrast Dye  Anaphylaxis     Pt reported facial and throat swelling with prior CT contrast    Pantoprazole      Protonix caused diffuse edema    Chantix [Varenicline]      Terrible dreams    Penicillins Itching and Rash          Physical Exam:     Vitals were reviewed     , Blood pressure (!) 164/75, pulse 65, temperature 98.6  F (37  C), temperature source Oral, resp. rate 18, weight 57 kg (125 lb 10.6 oz), SpO2 93%, not currently breastfeeding.  Wt Readings from Last 3 Encounters:   06/27/24 57 kg (125 lb 10.6 oz)   06/05/24 60.3 kg (132 lb 15 oz)   05/29/24 62.3 kg (137 lb 5.6 oz)     Intake/Output Summary (Last 24 hours) at 6/27/2024 1115  Last data filed at 6/27/2024 0900  Gross per 24 hour   Intake 930 ml   Output 600 ml   Net 330 ml     GENERAL APPEARANCE: pleasant, looks uncomfortable, alert  RESP:  diminished BS  CV: RRR, nl S1/S2   ABDOMEN: s/nt/nd  EXTREMITIES/SKIN: 2+ lle edema, R AKA  ACCESS:  [facility]         Data:     CBC RESULTS:     Recent Labs   Lab 06/27/24  0546 06/26/24  1637 06/26/24  0621 06/25/24  0544 06/24/24  0633 06/23/24  1646 06/23/24  1549 06/23/24  0849 06/23/24  0609   WBC 5.3  --  6.0 4.6 5.6  --   --  5.5 6.1   RBC 3.02*  --  2.93* 2.92* 3.06*  --   --  2.06* 2.14*   HGB 9.4* 9.8* 9.2* 9.0* 9.5*  9.5* 10.3*   < > 6.4* 6.4*   HCT 28.6*  --  27.5* 28.0* 29.2*  --   --  20.3* 20.9*     --  187 184 201  --   --  199 210    < > = values in this interval not displayed.     Basic Metabolic Panel:  Recent Labs   Lab 06/27/24  0546 06/26/24  0621 06/25/24  0544 06/24/24  0633 06/23/24  0609 06/22/24  2218 06/22/24  2146 06/22/24  1513 06/22/24  0541   * 133* 136 133* 133*  --   --   --  128*   POTASSIUM 4.0 3.0* 3.2* 3.8 5.3 5.3  --    < > 7.1*   CHLORIDE 94* 97* 99 97* 100  --   --   --  99   CO2 27 28 28 25 19*  --   --   --  16*   BUN 37.9* 29.8* 20.6 50.8* 90.3*  --   --   --  90.1*   CR 2.76* 2.26* 1.89* 2.84* 3.74*  --   --   --  3.16*   GLC 76 86 83 88 76  --  134*   < > 84    SONIA 7.6* 7.7* 7.6* 7.3* 7.3*  --   --   --  7.8*    < > = values in this interval not displayed.     INRNo lab results found in last 7 days.   Attestation:   I have reviewed today's relevant vital signs, notes, medications, labs and imaging.    Hardy Falcon MD  [facility] - Nephrology  [phone]

## 2024-06-28 ENCOUNTER — APPOINTMENT (OUTPATIENT)
Dept: OCCUPATIONAL THERAPY | Facility: CLINIC | Age: 64
DRG: 205 | End: 2024-06-28
Attending: INTERNAL MEDICINE
Payer: COMMERCIAL

## 2024-06-28 ENCOUNTER — APPOINTMENT (OUTPATIENT)
Dept: GENERAL RADIOLOGY | Facility: CLINIC | Age: 64
DRG: 205 | End: 2024-06-28
Attending: THORACIC SURGERY (CARDIOTHORACIC VASCULAR SURGERY)
Payer: COMMERCIAL

## 2024-06-28 LAB
ALBUMIN SERPL BCG-MCNC: 2.2 G/DL (ref 3.5–5.2)
ANION GAP SERPL CALCULATED.3IONS-SCNC: 9 MMOL/L (ref 7–15)
BACTERIA SPEC CULT: NORMAL
BUN SERPL-MCNC: 42.1 MG/DL (ref 8–23)
CALCIUM SERPL-MCNC: 7.6 MG/DL (ref 8.8–10.2)
CHLORIDE SERPL-SCNC: 94 MMOL/L (ref 98–107)
CREAT SERPL-MCNC: 2.77 MG/DL (ref 0.51–0.95)
DEPRECATED HCO3 PLAS-SCNC: 26 MMOL/L (ref 22–29)
EGFRCR SERPLBLD CKD-EPI 2021: 18 ML/MIN/1.73M2
ERYTHROCYTE [DISTWIDTH] IN BLOOD BY AUTOMATED COUNT: 14.5 % (ref 10–15)
GLUCOSE SERPL-MCNC: 78 MG/DL (ref 70–99)
HCT VFR BLD AUTO: 27.3 % (ref 35–47)
HGB BLD-MCNC: 9 G/DL (ref 11.7–15.7)
MCH RBC QN AUTO: 30.7 PG (ref 26.5–33)
MCHC RBC AUTO-ENTMCNC: 33 G/DL (ref 31.5–36.5)
MCV RBC AUTO: 93 FL (ref 78–100)
PHOSPHATE SERPL-MCNC: 5.4 MG/DL (ref 2.5–4.5)
PLATELET # BLD AUTO: 168 10E3/UL (ref 150–450)
POTASSIUM SERPL-SCNC: 4.4 MMOL/L (ref 3.4–5.3)
RBC # BLD AUTO: 2.93 10E6/UL (ref 3.8–5.2)
SODIUM SERPL-SCNC: 129 MMOL/L (ref 135–145)
WBC # BLD AUTO: 4.8 10E3/UL (ref 4–11)

## 2024-06-28 PROCEDURE — 80069 RENAL FUNCTION PANEL: CPT | Performed by: INTERNAL MEDICINE

## 2024-06-28 PROCEDURE — 99232 SBSQ HOSP IP/OBS MODERATE 35: CPT | Performed by: INTERNAL MEDICINE

## 2024-06-28 PROCEDURE — 250N000009 HC RX 250: Performed by: INTERNAL MEDICINE

## 2024-06-28 PROCEDURE — 85027 COMPLETE CBC AUTOMATED: CPT | Performed by: INTERNAL MEDICINE

## 2024-06-28 PROCEDURE — 71045 X-RAY EXAM CHEST 1 VIEW: CPT

## 2024-06-28 PROCEDURE — 97535 SELF CARE MNGMENT TRAINING: CPT | Mod: GO

## 2024-06-28 PROCEDURE — 97165 OT EVAL LOW COMPLEX 30 MIN: CPT | Mod: GO

## 2024-06-28 PROCEDURE — 999N000147 HC STATISTIC PT IP EVAL DEFER

## 2024-06-28 PROCEDURE — 94640 AIRWAY INHALATION TREATMENT: CPT | Mod: 76

## 2024-06-28 PROCEDURE — 250N000013 HC RX MED GY IP 250 OP 250 PS 637: Performed by: INTERNAL MEDICINE

## 2024-06-28 PROCEDURE — 999N000157 HC STATISTIC RCP TIME EA 10 MIN

## 2024-06-28 PROCEDURE — 94640 AIRWAY INHALATION TREATMENT: CPT

## 2024-06-28 PROCEDURE — 250N000011 HC RX IP 250 OP 636: Performed by: INTERNAL MEDICINE

## 2024-06-28 PROCEDURE — 250N000013 HC RX MED GY IP 250 OP 250 PS 637: Performed by: HOSPITALIST

## 2024-06-28 PROCEDURE — 120N000013 HC R&B IMCU

## 2024-06-28 RX ORDER — CARVEDILOL 12.5 MG/1
12.5 TABLET ORAL 2 TIMES DAILY WITH MEALS
Status: DISCONTINUED | OUTPATIENT
Start: 2024-06-28 | End: 2024-07-08 | Stop reason: HOSPADM

## 2024-06-28 RX ADMIN — IPRATROPIUM BROMIDE AND ALBUTEROL SULFATE 3 ML: .5; 3 SOLUTION RESPIRATORY (INHALATION) at 19:53

## 2024-06-28 RX ADMIN — CARVEDILOL 12.5 MG: 12.5 TABLET, FILM COATED ORAL at 14:32

## 2024-06-28 RX ADMIN — FAMOTIDINE 10 MG: 10 TABLET, FILM COATED ORAL at 10:31

## 2024-06-28 RX ADMIN — BUMETANIDE 2 MG: 0.25 INJECTION, SOLUTION INTRAMUSCULAR; INTRAVENOUS at 10:48

## 2024-06-28 RX ADMIN — CARVEDILOL 12.5 MG: 12.5 TABLET, FILM COATED ORAL at 18:51

## 2024-06-28 RX ADMIN — AMLODIPINE BESYLATE 10 MG: 10 TABLET ORAL at 10:31

## 2024-06-28 RX ADMIN — HYDROMORPHONE HYDROCHLORIDE 2 MG: 2 TABLET ORAL at 21:45

## 2024-06-28 RX ADMIN — IPRATROPIUM BROMIDE AND ALBUTEROL SULFATE 3 ML: .5; 3 SOLUTION RESPIRATORY (INHALATION) at 07:14

## 2024-06-28 RX ADMIN — FLUTICASONE FUROATE AND VILANTEROL TRIFENATATE 1 PUFF: 200; 25 POWDER RESPIRATORY (INHALATION) at 10:36

## 2024-06-28 RX ADMIN — CLOPIDOGREL BISULFATE 75 MG: 75 TABLET ORAL at 10:31

## 2024-06-28 RX ADMIN — HYDROMORPHONE HYDROCHLORIDE 2 MG: 2 TABLET ORAL at 18:48

## 2024-06-28 RX ADMIN — IPRATROPIUM BROMIDE AND ALBUTEROL SULFATE 3 ML: .5; 3 SOLUTION RESPIRATORY (INHALATION) at 11:17

## 2024-06-28 RX ADMIN — BUMETANIDE 2 MG: 0.25 INJECTION, SOLUTION INTRAMUSCULAR; INTRAVENOUS at 14:27

## 2024-06-28 RX ADMIN — NICOTINE 1 PATCH: 14 PATCH, EXTENDED RELEASE TRANSDERMAL at 10:37

## 2024-06-28 RX ADMIN — Medication 250 MG: at 21:45

## 2024-06-28 ASSESSMENT — ACTIVITIES OF DAILY LIVING (ADL)
PREVIOUS_RESPONSIBILITIES: MEAL PREP;MEDICATION MANAGEMENT;FINANCES
ADLS_ACUITY_SCORE: 50
ADLS_ACUITY_SCORE: 51
ADLS_ACUITY_SCORE: 50
ADLS_ACUITY_SCORE: 46
ADLS_ACUITY_SCORE: 50
ADLS_ACUITY_SCORE: 51
ADLS_ACUITY_SCORE: 50
ADLS_ACUITY_SCORE: 46
ADLS_ACUITY_SCORE: 50
ADLS_ACUITY_SCORE: 50

## 2024-06-28 NOTE — PROGRESS NOTES
[facility]     Renal Progress Note       SHORTHAND KEY FOR MY NOTES:  c = with, s = without, p = after, a = before, x = except, asx = asymptomatic, tx = transplant or treatment, sx = symptoms or symptomatic, cx = canceled or culture, rxn = reaction, yday = yesterday, nl = normal, abx = antibiotics, fxn = function, dx = diagnosis, dz = disease, m/h = melena/hematochezia, c/d/l/ha = cramping/dizziness/lightheadedness/headache, d/c = discharge or diarrhea/constipation, f/c/n/v = fevers/chills/nausea/vomiting, cp/sob = chest pain/shortness of breath, tbv = total body volume, rxn = reaction, tdc = tunneled dialysis catheter, pta = prior to admission, hd = hemodialysis, pd = peritoneal dialysis, hhd = home hemodialysis, edw = estimated dry wt         Assessment/Plan:     1.  LIMA/CKD IV.  Pt's cr is stable and she is responding to diuretics. She remains TBV up, and despite some pulm congestion on CXR, she is breathing fine and her lung exam is better.  No significant uremic sx noted.  There is not a need for dialysis today, but we will continue to monitor clinically.  A.  Check labs, uo, sx daily.  B.  Continue bumet 2 mg iv bid.  C.  Strict I/Os.  D.  Avoid nephrotoxics.    2.  Pleuritic L cp.  This has improved.    A.  Follow clinically.  B.  Supp o2 prn.    3.  Anemia. Hb is stable in the 9s.    A.  Follow hb, clinically.    4.  FEN.  K is fine.  Na is a bit lower.  A.  Follow electrolytes daily.    Case d/w Sasha Urena RN.        Interval History:     Pt feels well today.  She slept very soundly last night.  Denies any cp/sob/orthopnea.  Urinating ok.            Medications and Allergies:     Current Facility-Administered Medications   Medication Dose Route Frequency Provider Last Rate Last Admin    - MEDICATION INSTRUCTIONS for Dialysis Patients -   Does not apply See Admin Instructions Love Gross, [facility]        amLODIPine (NORVASC) tablet 10 mg  10 mg Oral Daily Devin Ravi  MD Lis   10 mg at 06/27/24 0844    bumetanide (BUMEX) injection 2 mg  2 mg Intravenous bid 08 & 14 Hardy Falcon MD   2 mg at 06/27/24 1501    clopidogrel (PLAVIX) tablet 75 mg  75 mg Oral Daily Devin Ravi MD   75 mg at 06/27/24 0844    famotidine (PEPCID) tablet 10 mg  10 mg Oral Daily Devin Ravi MD   10 mg at 06/27/24 0844    fluticasone-vilanterol (BREO ELLIPTA) 200-25 MCG/ACT inhaler 1 puff  1 puff Inhalation Daily Bruce Ulloa MD   1 puff at 06/27/24 0852    sodium chloride 0.9% DIALYSIS Cath LOCK - BLUE Lumen  1.3-2.6 mL Intracatheter Once Leonor Salomon DO        Followed by    heparin 1000 unit/mL DIALYSIS Cath LOCK - BLUE Lumen  3 mL Intracatheter Once Leonor Salomon DO        sodium chloride 0.9% DIALYSIS Cath LOCK - RED Lumen  1.3-2.6 mL Intracatheter Once Leonor Salomon DO        Followed by    heparin 1000 unit/mL DIALYSIS Cath LOCK - RED Lumen  3 mL Intracatheter Once Leonor Salomon DO        HYDROmorphone (PF) (DILAUDID) injection 0.3 mg  0.3 mg Intravenous Once Devin Ravi MD        ipratropium - albuterol 0.5 mg/2.5 mg/3 mL (DUONEB) neb solution 3 mL  3 mL Nebulization 4x daily Rod Nath MD   3 mL at 06/28/24 0714    nicotine (NICODERM CQ) 14 MG/24HR 24 hr patch 1 patch  1 patch Transdermal Daily Bruce Ulloa MD   1 patch at 06/27/24 0844    polyethylene glycol (MIRALAX) Packet 17 g  17 g Oral Daily Bruce Ulloa MD   17 g at 06/25/24 0906    [Held by provider] rosuvastatin (CRESTOR) tablet 10 mg  10 mg Oral At Bedtime Bruce Ulloa MD   10 mg at 06/22/24 2159    saccharomyces boulardii (FLORASTOR) capsule 250 mg  250 mg Oral BID Bruce Ulloa MD   250 mg at 06/27/24 2035    sodium chloride (PF) 0.9% PF flush 3 mL  3 mL Intracatheter Q8H Bruce Ulloa MD   3 mL at 06/28/24 4193     Allergies   Allergen Reactions    Contrast Dye Anaphylaxis     Pt reported facial and throat swelling with prior  CT contrast    Pantoprazole      Protonix caused diffuse edema    Chantix [Varenicline]      Terrible dreams    Penicillins Itching and Rash          Physical Exam:     Vitals were reviewed     , Blood pressure (!) 153/79, pulse 68, temperature 97.9  F (36.6  C), temperature source Axillary, resp. rate 19, weight 58 kg (127 lb 13.9 oz), SpO2 92%, not currently breastfeeding.  Wt Readings from Last 3 Encounters:   06/28/24 58 kg (127 lb 13.9 oz)   06/05/24 60.3 kg (132 lb 15 oz)   05/29/24 62.3 kg (137 lb 5.6 oz)     Intake/Output Summary (Last 24 hours) at 6/28/2024 0831  Last data filed at 6/28/2024 0700  Gross per 24 hour   Intake 580 ml   Output 800 ml   Net -220 ml     GENERAL APPEARANCE: pleasant, looks better, alert  RESP:  clear B  CV: RRR, nl S1/S2   ABDOMEN: s/nt/nd  EXTREMITIES/SKIN: 2+ lle edema, R AKA  ACCESS:  + RIJ TDC         Data:     CBC RESULTS:     Recent Labs   Lab 06/28/24  0552 06/27/24  0546 06/26/24  1637 06/26/24  0621 06/25/24  0544 06/24/24  0633 06/23/24  1549 06/23/24  0849   WBC 4.8 5.3  --  6.0 4.6 5.6  --  5.5   RBC 2.93* 3.02*  --  2.93* 2.92* 3.06*  --  2.06*   HGB 9.0* 9.4* 9.8* 9.2* 9.0* 9.5*  9.5*   < > 6.4*   HCT 27.3* 28.6*  --  27.5* 28.0* 29.2*  --  20.3*    196  --  187 184 201  --  199    < > = values in this interval not displayed.     Basic Metabolic Panel:  Recent Labs   Lab 06/28/24  0552 06/27/24  1709 06/27/24  0546 06/26/24  0621 06/25/24  0544 06/24/24  0633 06/23/24  0609   *  --  130* 133* 136 133* 133*   POTASSIUM 4.4  --  4.0 3.0* 3.2* 3.8 5.3   CHLORIDE 94*  --  94* 97* 99 97* 100   CO2 26  --  27 28 28 25 19*   BUN 42.1*  --  37.9* 29.8* 20.6 50.8* 90.3*   CR 2.77*  --  2.76* 2.26* 1.89* 2.84* 3.74*   GLC 78 139* 76 86 83 88 76   SONIA 7.6*  --  7.6* 7.7* 7.6* 7.3* 7.3*     INRNo lab results found in last 7 days.   Attestation:   I have reviewed today's relevant vital signs, notes, medications, labs and imaging.    Hardy Falcon MD  [facility] - Nephrology  [phone]

## 2024-06-28 NOTE — PLAN OF CARE
Neuro: A&O x4  Tele/cardiac: SR  Respiration: 1 L NC  Activity: A2 with lift q2 turn and repo  Pain: dilaudid given x1 for generalized chest pain  Drips: PICC SL  Drains/tubes: none  Skin: PI coccyx, R AKA wound  GI/: incontinent, pure wick in place, adequate UOP  Aggression color: green  Isolation: none

## 2024-06-28 NOTE — PROGRESS NOTES
06/28/24 0840   Appointment Info   Signing Clinician's Name / Credentials (OT) Epifanio Thomas OTR/L   Living Environment   People in Home sibling(s);child(ifeanyi), adult   Current Living Arrangements house  ([place] style home)   Transportation Anticipated agency   Living Environment Comments Recent hospitalizations and pt with a stay in ARU and TCU. Pt reported that she had been home recently and initiated home therapies. Family can assist as needed, however they work during the day.   Self-Care   Usual Activity Tolerance good   Current Activity Tolerance moderate   Equipment Currently Used at Home wheelchair, manual;shower chair;commode chair  (Has a shoe horn)   Activity/Exercise/Self-Care Comment Independent in all ADLs at baseline. Family sets up sponge bathing supplies, pt able to complete on her own. Pt can not access the bathroom, so typically uses a BSC. Uses a transfer board for transfers to wheelchair. BSC etc.   Instrumental Activities of Daily Living (IADL)   Previous Responsibilities meal prep;medication management;finances   IADL Comments Orders groceries online.   General Information   Onset of Illness/Injury or Date of Surgery 06/21/24   Referring Physician Rod Nath MD   Patient/Family Therapy Goal Statement (OT) Home   Additional Occupational Profile Info/Pertinent History of Current Problem Per chart: Shirley Hendricks is a 63 year old female with PMH CAD, MI, peripheral vascular disease status angioplasty and right AKA with wound vac, DLE, COPD, CMT disease, tobacco use, CKD stage III, GERD, hypertension, history of B-cell lymphoma, hyperlipidemia, depression, anxiety, who was admitted on 6/21/2024 with acute hypoxic respiratory failure due to a large left pleural effusion, hyperkalemia, and hyponatremia. Patient recently admitted to [facility] 3/29-4/22/24 with right limb ischemia in setting of severe PAD ultimately requiring right AKA with complicated post op course (including LIMA  requiring HD) after which she was discharged to  ARU. She was readmitted to [facility] 5/15-5/29/24 with stump eschars requiring surgical debridement and wound VAC placement. This admission, patient underwent left thoracentesis with 800mL and 1200mL fluid removed. Severe hyperkalemia improved with Lokelma. Patient has been anuric since admission, started on dialysis on 6/23. Chest tube placed 6/23, CT on 6/24 shows ongoing left bronchus obstruction, felt to be mucous plugging per Pulm. Improved after dialysis 6/24, now making urine, O2 needs down to 2-3L.  S/p bronchoscopy 6/26 with mucous plugging removed. Chest tube to be removed 6/26. Lung re-expansion noted 6/26.See chart for details.   General Observations and Info Decreased overall activity tolerance and strength.   Cognitive Status Examination   Orientation Status orientation to person, place and time   Safety Deficit at risk behavior observed;awareness of need for assistance;insight into deficits/self-awareness;judgment;problem-solving   Pain Assessment   Patient Currently in Pain Yes, see Vital Sign flowsheet   Transfers   Transfers bed-chair transfer;sit-stand transfer;toilet transfer   Transfer Skill: Bed to Chair/Chair to Bed   Bed-Chair [place] (Transfers) set up;verbal cues  (SBA with transfer board)   Activities of Daily Living   BADL Assessment/Intervention lower body dressing   Additional Documentation Comment, BADL Assessment/Training (Row)   Upper Body Dressing Assessment/Training   [place] Level (Upper Body Dressing) doff;don;set up;verbal cues  (SBA)   Clinical Impression   Criteria for Skilled Therapeutic Interventions Met (OT) Yes, treatment indicated   OT Diagnosis Decreased independence in I/ADLs   Influenced by the following impairments Decreased independence in I/ADLs   OT Problem List-Impairments impacting ADL problems related to;activity tolerance impaired;cognition;strength   Assessment of Occupational Performance 1-3  Performance Deficits   Identified Performance Deficits Decreased independence in I/ADLs (Dressing, bathing, toileting)   Planned Therapy Interventions (OT) ADL retraining;IADL retraining;cognition;strengthening;transfer training;home program guidelines   Clinical Decision Making Complexity (OT) problem focused assessment/low complexity   Risk & Benefits of therapy have been explained evaluation/treatment results reviewed;care plan/treatment goals reviewed;risks/benefits reviewed;patient   OT Total Evaluation Time   OT Eval, Low Complexity Minutes (99760) 7   OT Goals   Therapy Frequency (OT) Daily   OT Predicted Duration/Target Date for Goal Attainment 07/04/24   OT Goals Hygiene/Grooming;Upper Body Dressing;Lower Body Dressing;Toilet Transfer/Toileting;Cognition   OT: Hygiene/Grooming modified independent   OT: Upper Body Dressing Modified independent   OT: Lower Body Dressing Modified independent   OT: Upper Body Bathing Modified independent   OT: Lower Body Bathing Modified independent   OT: Toilet Transfer/Toileting Modified independent  (BSC)   OT: Cognitive Patient/caregiver will verbalize understanding of cognitive assessment results/recommendations as needed for safe discharge planning   OT: Goal 1 Pt will verbalize and demonstrate understanding of BUE exercises in order to increase strength and IND in I/ADLs.   Interventions   Interventions Quick Adds Self-Care/Home Management   Self-Care/Home Management   Self-Care/Home Mgmt/ADL, Compensatory, Meal Prep Minutes (15123) 40   Symptoms Noted During/After Treatment (Meal Preparation/Planning Training) fatigue   Treatment Detail/Skilled Intervention Pt with soiled briefs and reporting verbal frustration with overall care in the hospital, gown being oversized, briefs not fitting correctly, home situation. Educated on LE dressing with compensatory techniques. While in supine (and with hip bridging), pt completed LE dressing (don/doff underwear) with SBA and set  up. Educated on functional transfer to the wheelchair with transfer board. Pt able to complete functional transfer from the EOB to the wheelchair with slide board with overall SBA and set up.  Pt again voicing frustration, this time about the size of the transfer board and wheelchair, provided education on limitations of hospital equipment and encouraged pt to focus on safety and quality of the functional transfers.   Alarm on and all needs in reach at the end of session.   OT Discharge Planning   OT Plan BSC transfr; LE dressing   OT Discharge Recommendation (DC Rec) home with assist;home with home care occupational therapy   OT Rationale for DC Rec Pt reports IND in all ADLs and functional transfers (transfer board to wheelchair, BSC etc). Anticipate with continued therapy and medical management, home with assist in all I/ADLs and resume home OT/PT.   OT Brief overview of current status SBA LE dressing   Total Session Time   Timed Code Treatment Minutes 40   Total Session Time (sum of timed and untimed services) 47

## 2024-06-28 NOTE — PLAN OF CARE
PT: Orders received. Chart reviewed and discussed with care team.  PT not indicated due to patient wheelchair bound and performs sliding board transfers at home, performing SBA with OT at this time. OT able manage transfer training during acute hospital stay.  Defer discharge recommendations to OT evaluation.  Will complete orders.

## 2024-06-28 NOTE — PLAN OF CARE
Goal Outcome Evaluation:       A&O x4. VSS on 1 LPM O2 NC. Lungs sounds - course/crackles. Bowel Sounds - normoactive, audible, 2 BM's during the shift. Urine Output -anuric, bladder scanned, no output.  On Bumex. Wounds - coccyx wound, woundcare per plan of care. Ambulation - Assist of two with lift and slide board. Rt leg amputee above the knee. Diet - renal, 1800 fluid restriction. Pain controlled by - PRN oxy. No dialysis today.

## 2024-06-28 NOTE — PROGRESS NOTES
"CLINICAL NUTRITION SERVICES  -  ASSESSMENT NOTE    Recommendations Ordered by Registered Dietitian (RD):   Encouraged po intake, emphasizing the importance of protein for wound healing and recovery    Daily Expedite and Special K bar @ 2 pm    Malnutrition: 6/28   % Weight Loss:  None noted - fluctuating fluid status  % Intake:  No decreased intake noted  Subcutaneous Fat Loss:  Orbital region mild depletion and Upper arm region mild depletion  Muscle Loss:  Global Mild   Fluid Retention:  Moderate 3+ LE to mild 2+ generalized     Malnutrition Diagnosis: Moderate malnutrition  In Context of:  Acute illness or injury     REASON FOR ASSESSMENT  Shirley Hendricks is a 63 year old female seen by Registered Dietitian for LOS    PMH of: CAD, MI, peripheral vascular disease status angioplasty and right AKA with wound vac, DLE, COPD, CMT disease, tobacco use, CKD stage III, GERD, hypertension, history of B-cell lymphoma, hyperlipidemia, depression, anxiety, who was admitted on 6/21/2024 with acute hypoxic respiratory failure due to a large left pleural effusion, hyperkalemia, and hyponatremia.     NUTRITION HISTORY  - Information obtained from chart review and pt in chair  - Diet History - No reported reduced intake PTA and strongly said her eating has been \"fine\" a couple times.   - Supplements - Shirley didn't like the Nepro she tried at [facility], declined [employer] (Gluten free supplement) and Gel+, but wanted a daily Expedite and Special K bar @ 2 pm     CURRENT NUTRITION ORDERS  Diet Order:   Regular, 2000 mg Sodium, Renal (Dialysis), and 1800 ml fluid restriction     Current Intake/Tolerance: Shirley repeated her po intake while being admitted has been \"fine.\" She was eager to have her fluid restriction increased so she can have a second cup of coffee. She had no questions about her restricted diet.     NUTRITION FOCUSED PHYSICAL ASSESSMENT FOR DIAGNOSING MALNUTRITION)  Yes    Observed:    Muscle wasting (refer to " "documentation in Malnutrition section), Subcutaneous fat loss (refer to documentation in Malnutrition section), and Fluid retention (refer to documentation in Malnutrition section)    Obtained from Chart/Interdisciplinary Team:  Edema mild - moderate    ANTHROPOMETRICS  Height: 5' 1\"  Weight: 127 lbs 13.87 oz   Body mass index is 24.16 kg/m .  Weight Status:  Normal BMI  IBW: 47.8 kg  % IBW: 121% (using actual wt as pt w/ AKA)   Weight History:   Wt Readings from Last 10 Encounters:   06/28/24 58 kg (127 lb 13.9 oz)   06/05/24 60.3 kg (132 lb 15 oz)   05/29/24 62.3 kg (137 lb 5.6 oz)   05/14/24 57 kg (125 lb 10.6 oz)   04/22/24 50.5 kg (111 lb 5.3 oz)   01/08/24 49.8 kg (109 lb 12.8 oz)   12/12/23 50.8 kg (112 lb)   11/21/23 50.6 kg (111 lb 8 oz)   11/13/23 51.6 kg (113 lb 11.2 oz)   10/11/23 52.5 kg (115 lb 11.2 oz)      LABS  Labs reviewed     MEDICATIONS  Medications reviewed     ASSESSED NUTRITION NEEDS PER APPROVED PRACTICE GUIDELINES:  Dosing Weight 47.8 kg (actual as pt w/ AKA)  Estimated Energy Needs: 6015-9629 kcals (25-30+ Kcal/Kg)  Justification: maintenance + wound healing  Estimated Protein Needs: 57-72 grams protein (1.2-1.5 g pro/Kg)  Justification: wound healing on CKD  Estimated Fluid Needs: 1800  mL restriction, per provider pending fluid status    MALNUTRITION:   % Weight Loss:  None noted - fluctuating fluid status  % Intake:  No decreased intake noted  Subcutaneous Fat Loss:  Orbital region mild depletion and Upper arm region mild depletion  Muscle Loss:  Global Mild   Fluid Retention:  Moderate 3+ LE to mild 2+ generalized     Malnutrition Diagnosis: Moderate malnutrition  In Context of:  Acute illness or injury    NUTRITION DIAGNOSIS:  Inadequate oral intake related to increased needs (protein) as evidenced by needs >1.2 g/kg/d to support wound healing and oral nutrition supplements to help meet needs.     NUTRITION INTERVENTIONS  Recommendations / Nutrition Prescription  Encouraged po intake, " emphasizing the importance of protein for wound healing and recovery    Daily Expedite and Special K bar @ 2 pm   Continue Regular, 2000 mg Sodium, Renal (Dialysis), and 1800 ml fluid restriction     Implementation  Nutrition education: Per Provider order if indicated   General/healthful diet and Medical Food Supplement    Nutrition Goals  PO intake - >75% of meal trays, or the equivalent in supplements, TID     MONITORING AND EVALUATION:  Progress towards goals will be monitored and evaluated per protocol and Practice Guidelines    Vasquez Wang RD, LD

## 2024-06-28 NOTE — PROGRESS NOTES
[facility]    Hospitalist Progress Note    Interval History   - Doing better today, patient reports deeper breaths  - Creatinine stable, making urine  - Lung exam stable, CXR shows similar level of left pleural effusion  - Encouraged fluid restriction  - Patient anxious to discharge home. I discussed with her that if her renal function improves and her volume status improves then I anticipate about 2-3 more days in the hospital    Assessment & Plan   Summary: Shirley Hendricks is a 63 year old female with PMH CAD, MI, peripheral vascular disease status angioplasty and right AKA with wound vac, DLE, COPD, CMT disease, tobacco use, CKD stage III, GERD, hypertension, history of B-cell lymphoma, hyperlipidemia, depression, anxiety, who was admitted on 6/21/2024 with acute hypoxic respiratory failure due to a large left pleural effusion, hyperkalemia, and hyponatremia.   Patient recently admitted to [facility] 3/29-4/22/24 with right limb ischemia in setting of severe PAD ultimately requiring right AKA with complicated post op course (including LIMA requiring HD) after which she was discharged to  ARU. She was readmitted to [facility] 5/15-5/29/24 with stump eschars requiring surgical debridement and wound VAC placement.   This admission, patient underwent left thoracentesis with 800mL and 1200mL fluid removed. Severe hyperkalemia improved with Lokelma. Patient has been anuric since admission, started on dialysis on 6/23. Chest tube placed 6/23, CT on 6/24 shows ongoing left bronchus obstruction, felt to be mucous plugging per Pulm. Improved after dialysis 6/24, now making urine, O2 needs down to 2-3L.   S/p bronchoscopy 6/26 with mucous plugging removed. Chest tube to be removed 6/26. Lung re-expansion noted 6/26. Renal function stabilizing 6/28.     Left mainstem bronchial obstruction due to mucous plugging s/p bronchoscopy 6/26/2024  Acute hypoxic respiratory failure due to above,  improved  Large left pleural effusion, transudative  S/p left thoracentesis 800mL 6/21, 1200mL 6/22  S/p left chest tube 6/23/2024  Right moderate pleural effusion  Patient with increasing shortness of breath for 1-2 weeks PTA. CXR 6/21 in ED reveals complete opacification of the left hemithorax. CT chest-large left pleural effusion with complete collapse of the left upper and left lower and occlusion of left mainstem bronchus and a moderate size right pleural effusion. Patient underwent thoracentesis 800mL on 6/21, fluid appears transudative. Patient appears markedly volume overloaded. In the ED she initially required 2L O2 but was transitioned to HFNC overnight 6/22 to 6/23 during RRT.  Reports symptomatic improvement with thoracentesis 6/21 and 6/22, but continues to require 10+L O2 or HFNC on 6/23. Discussed with Pulm on 6/23, concern remains left mainstem bronchus obstruction but need to rule out recurrent pleural effusion as cause, so chest tube placed on 6/23. CT chest on 6/24 shows persistent persistent left bronchial obstruction, per Pulm likely mucous plugging.  Bronchoscopy done 6/26 with aspiration of mucous plugs. Chest tube removed 6/26. Patient with chest pain due to lung re-expansion 6/26, CXR 6/27 shows improving lung expansion. Renal function stabilizing 6/28. It appears that patient's major turnaround started following bronchoscopy on 6/26.  - Pleural fluid cytology, CMV, PCP pending  - Pulmonology consulted and appreciated    Anuric renal failure on CKD, requiring brief dialysis, improved  Recent acute renal failure in May 2024 needing hemodialysis  Severe hyperkalemia, improved  Hyponatremia  Metabolic acidosis  Recent baseline creatinine has been fluctuating between 1.9-2.8. On admission Creatinine is 3.07, potassium is 7.5, sodium of 128 with bicarb of 17.   In the ER patient did receive albuterol, 2 g calcium gluconate, insulin, Kayexalate 10 g and 40 IV Lasix and 1 L IV fluid bolus. EKG  showed sinus bradycardia with prolonged QT. Potassium has been elevated to 7.1 since admission, improved to 5.3 on 6/23 with Lokelma.  Patient has been anuric since admission, no urine output. Etiology of anuric renal failure likely related to left mainstem bronchus obstruction. Due to ongoing anuria, TDC placed 6/23 and dialyzed 6/23 and 6/24.  Started to making urine 6/25, creatinine has stabilized on 6/28.  - Appreciate Nephrology consult  - Dialysis PRN  - Bumex    Possible acute diastolic congestive heart failure exacerbation  Elevated troponin suspect due to volume overload  Patient presents with large left pleural effusion, BNP  elevated to 11.9k. Note history of stress cardiomyopathy in Oct 2023 with EF 30-35% which then resolved on echo Nov 2023.    Patient does not have any significant chest discomfort and troponin are 174 and 177 and trending down to 156. Echo 6/22 shows Ef 60-65%, mid anterior and lateral severe hypokinessis, distal inferior hypokinesis, normal RV function. Suspect large component of renal failure contributing. Felt less likely to be CHF as this was likely due to renal failure.   - Cardiology consult appreciated  - HD per Nephrology, see above  - Tele, weights, I&O    Systolic murmur: Noted systolic murmur 6/23, limited echo 6/24 shows no change from 6/22, likely flow murmur. Murmur improved 6/28.    Anemia of chronic disease  Acute anemia 6/23 likely spurious  Recent baseline hemoglobin has been fluctuating and has been between 8.4-10.1.   Hemoglobin 8.4 on 6/22-->6.4 on 6/23. Patient denies blood loss. Bilirubin is not elevated makes hemolysis less likely. Patient was given 1u pRBC, recheck Hgb was 9.5 via skin draw. The Hgb of 6.4 likely was spurious related to an inaccurate PICC line draw, likely Hgb was 8.5-->9.5 with 1u pRBC.  - [facility] consult appreciated, signed off  - Daily Hgb checks    Hypertension  Hyperlipidemia  History of coronary disease with an MI in 2019  Left heart  cath on 10/4/2023 showed-completely occluded D1 with left to left collaterals from distal LAD to D1, moderate CAD involving proximal to mid RCA not significant by IFR and moderate coronary disease please involving distal small caliber RPL and distal circumflex artery. Last echo  showed EF of 55 to 60% with normal RV and moderate trileaflet aortic sclerosis  - Resume PTA Coreg   - Continue amlodipine 10mg daily    History of peripheral vascular disease status above-knee amputation on the right with wound dressing status washout on   Neuropathic pain  Patient has followed with vascular surgery and has noticed during last hospitalization in May 2024 she underwent washout with wound VAC placement and was discharged on Plavix and Crestor. On exam the amputation stump does not look infected and has wound vac in place   - Continue with PTA Plavix 75 mg daily  - Continues with wound vac, vascular surgery consult appreciated  - Pain control with hydromorphone oral PRN (avoid oxycodone as it is renally cleared)    COPD not in exacerbation  - On exam has no wheezing and continue with as needed symbicort    GERD: Famotidine BID    History of B-cell lymphoma  - Follows with [place] [facility]  - Pending cytology from thoracentesis     Tobacco use  - Continue with nicotine patch    Ongoing stressors  Patient had a recent AKA in 2024 with complicated post op course 2024, and reports   2024.    Clinically Significant Risk Factors         # Hyponatremia: Lowest Na = 129 mmol/L in last 2 days, will monitor as appropriate      # Hypoalbuminemia: Lowest albumin = 2.2 g/dL at 2024  5:52 AM, will monitor as appropriate     # Hypertension: Noted on problem list             #Precipitous drop in Hgb/Hct: Lowest Hgb this hospitalization: 6.4 g/dL. Will continue to monitor and treat/transfuse as appropriate.           # Financial/Environmental Concerns: none          PT/OT: ordered  Diet:  Fluid restriction 1800 ML FLUID  Combination Diet Regular Diet; Renal Diet (dialysis); 2 gm NA Diet; (1800 ml fr)  Snacks/Supplements Adult: Special [employer] Bar; Between Meals  Snacks/Supplements Adult: Expedite Bottle; Between Meals    DVT Prophylaxis: none  Alvarez Catheter: Not present  Lines: PRESENT      PICC 06/22/24 Double Lumen Left Brachial vein medial access-Site Assessment: WDL  CVC Right Subclavian-Site Assessment: WDL  CVC Double Lumen Right Subclavian Tunneled-Site Assessment: WDL    Cardiac Monitoring: ACTIVE order. Indication: Chest pain/ ACS rule out (24 hours)  Code Status: Full Code    Medically Ready for Discharge: Anticipated in 2-4 Days, probably close to 2 days, likely discharge home once renal function improved      Devin Ravi MD  Hospitalist Service  [facility]  Securely message with [employer] (more info)  Text page via [employer] Paging/Directory     Data reviewed today: I reviewed all new labs and imaging results over the last 24 hours.    Physical Exam   Temp: 97.9  F (36.6  C) Temp src: Axillary BP: (!) 153/79 Pulse: 68   Resp: 19 SpO2: 92 % O2 Device: None (Room air) Oxygen Delivery: 1 LPM  Vitals:    06/26/24 0623 06/27/24 0557 06/28/24 0600   Weight: 57.3 kg (126 lb 5.2 oz) 57 kg (125 lb 10.6 oz) 58 kg (127 lb 13.9 oz)     Vital Signs with Ranges  Temp:  [97.9  F (36.6  C)-98.6  F (37  C)] 97.9  F (36.6  C)  Pulse:  [60-72] 68  Resp:  [9-54] 19  BP: (144-172)/(69-83) 153/79  SpO2:  [90 %-95 %] 92 %  I/O last 3 completed shifts:  In: 100 [P.O.:100]  Out: 800 [Urine:800]  O2 requirements: Yes HFNC    Constitutional: Female in NAD  HEENT: Eyes nonicteric, oral mucosa moist  Cardiovascular: RRR, normal S1/2, no m/r/g  Respiratory: Noted breath sounds NOMAN, normal R breath sounds RUL and RML, decreased breath sounds LLL and RLL  Vascular: Anasarca with UE and LLE pitting edema, note R AKA with wound vac  GI: Normoactive bowel sounds, nontender  Skin/Integumen: No  rashes  Neuro/Psych: Appropriate affect and mood. A&Ox3, moves all extremities    Medications   Current Facility-Administered Medications   Medication Dose Route Frequency Provider Last Rate Last Admin    No lozenges or gum should be given while patient on BIPAP/AVAPS/AVAPS AE   Does not apply Continuous PRN Devin Ravi MD        Patient may continue current oral medications   Does not apply Continuous PRN Devin Ravi MD         Current Facility-Administered Medications   Medication Dose Route Frequency Provider Last Rate Last Admin    - MEDICATION INSTRUCTIONS for Dialysis Patients -   Does not apply See Admin Instructions Love Gross, [facility]        amLODIPine (NORVASC) tablet 10 mg  10 mg Oral Daily Devin Ravi MD   10 mg at 06/28/24 1031    bumetanide (BUMEX) injection 2 mg  2 mg Intravenous bid 08 & 14 Hardy Falcon MD   2 mg at 06/28/24 1048    clopidogrel (PLAVIX) tablet 75 mg  75 mg Oral Daily Devin Ravi MD   75 mg at 06/28/24 1031    famotidine (PEPCID) tablet 10 mg  10 mg Oral Daily Devin Ravi MD   10 mg at 06/28/24 1031    fluticasone-vilanterol (BREO ELLIPTA) 200-25 MCG/ACT inhaler 1 puff  1 puff Inhalation Daily Bruce Ulloa MD   1 puff at 06/28/24 1036    sodium chloride 0.9% DIALYSIS Cath LOCK - BLUE Lumen  1.3-2.6 mL Intracatheter Once Leonor Salomon DO        Followed by    heparin 1000 unit/mL DIALYSIS Cath LOCK - BLUE Lumen  3 mL Intracatheter Once Leonor Salomon DO        sodium chloride 0.9% DIALYSIS Cath LOCK - RED Lumen  1.3-2.6 mL Intracatheter Once Leonor Salomon DO        Followed by    heparin 1000 unit/mL DIALYSIS Cath LOCK - RED Lumen  3 mL Intracatheter Once Leonor Salomon DO        HYDROmorphone (PF) (DILAUDID) injection 0.3 mg  0.3 mg Intravenous Once Devin Ravi MD        ipratropium - albuterol 0.5 mg/2.5 mg/3 mL (DUONEB) neb solution 3 mL  3 mL Nebulization  4x daily Rod Nath MD   3 mL at 06/28/24 0714    nicotine (NICODERM CQ) 14 MG/24HR 24 hr patch 1 patch  1 patch Transdermal Daily Bruce Ulloa MD   1 patch at 06/28/24 1037    polyethylene glycol (MIRALAX) Packet 17 g  17 g Oral Daily Bruce Ulloa MD   17 g at 06/25/24 0906    [Held by provider] rosuvastatin (CRESTOR) tablet 10 mg  10 mg Oral At Bedtime Bruce Ulloa MD   10 mg at 06/22/24 2159    saccharomyces boulardii (FLORASTOR) capsule 250 mg  250 mg Oral BID Bruce Ulloa MD   250 mg at 06/27/24 2035    sodium chloride (PF) 0.9% PF flush 3 mL  3 mL Intracatheter Q8H Bruce Ulloa MD   3 mL at 06/28/24 0552       Data   Recent Labs   Lab 06/28/24  0552 06/27/24  1709 06/27/24  0546 06/26/24  1637 06/26/24  0621 06/23/24  0849 06/23/24  0609 06/21/24  1802 06/21/24  1749 06/21/24  1610 06/21/24  1449   WBC 4.8  --  5.3  --  6.0   < > 6.1   < >  --   --  4.7   HGB 9.0*  --  9.4* 9.8* 9.2*   < > 6.4*   < >  --   --  8.4*   MCV 93  --  95  --  94   < > 98   < >  --   --  97     --  196  --  187   < > 210   < >  --   --  248   *  --  130*  --  133*   < > 133*   < > 128*  --  129*   POTASSIUM 4.4  --  4.0  --  3.0*   < > 5.3   < > 6.4*   < > 6.8*   CHLORIDE 94*  --  94*  --  97*   < > 100   < > 99  --  99   CO2 26  --  27  --  28   < > 19*   < > 17*  --  18*   BUN 42.1*  --  37.9*  --  29.8*   < > 90.3*   < > 90.3*  --  87.4*   CR 2.77*  --  2.76*  --  2.26*   < > 3.74*   < > 3.07*  --  3.01*   ANIONGAP 9  --  9  --  8   < > 14   < > 12  --  12   SONIA 7.6*  --  7.6*  --  7.7*   < > 7.3*   < > 8.5*  --  7.7*   GLC 78 139* 76  --  86   < > 76   < > 114*   < > 116*   ALBUMIN 2.2*  --  2.3*  --  2.4*   < > 2.6*  --   --   --  2.4*   PROTTOTAL  --   --   --   --   --   --  4.5*  --  5.6*  --  5.2*   BILITOTAL  --   --   --   --   --   --  <0.2  --   --   --  <0.2   ALKPHOS  --   --   --   --   --   --  43  --   --   --  63   ALT  --   --   --   --   --   --  18  --   --   --  28   AST  --   --    --   --   --   --  15  --   --   --  27    < > = values in this interval not displayed.       Imaging:   Recent Results (from the past 24 hour(s))   XR Chest Port 1 View    Narrative    EXAM: XR CHEST PORT 1 VIEW  LOCATION: [facility]  DATE: 6/28/2024    INDICATION: pleural effusion  COMPARISON: 06/27/2024      Impression    IMPRESSION: Stable cardiomediastinal silhouette. Right central line and left PICC line tip cavoatrial junction. Pulmonary vascular congestion. Bibasilar atelectasis or infiltrate. Left greater than right pleural effusions. Linear density left mid chest   favored to be skinfold. Atherosclerotic aorta.

## 2024-06-29 ENCOUNTER — APPOINTMENT (OUTPATIENT)
Dept: GENERAL RADIOLOGY | Facility: CLINIC | Age: 64
DRG: 205 | End: 2024-06-29
Attending: THORACIC SURGERY (CARDIOTHORACIC VASCULAR SURGERY)
Payer: COMMERCIAL

## 2024-06-29 ENCOUNTER — APPOINTMENT (OUTPATIENT)
Dept: OCCUPATIONAL THERAPY | Facility: CLINIC | Age: 64
DRG: 205 | End: 2024-06-29
Payer: COMMERCIAL

## 2024-06-29 LAB
ALBUMIN SERPL BCG-MCNC: 2.1 G/DL (ref 3.5–5.2)
ANION GAP SERPL CALCULATED.3IONS-SCNC: 9 MMOL/L (ref 7–15)
BACTERIA PLR CULT: NORMAL
BUN SERPL-MCNC: 49.8 MG/DL (ref 8–23)
CALCIUM SERPL-MCNC: 7.4 MG/DL (ref 8.8–10.2)
CHLORIDE SERPL-SCNC: 91 MMOL/L (ref 98–107)
CREAT SERPL-MCNC: 2.97 MG/DL (ref 0.51–0.95)
DEPRECATED HCO3 PLAS-SCNC: 26 MMOL/L (ref 22–29)
EGFRCR SERPLBLD CKD-EPI 2021: 17 ML/MIN/1.73M2
ERYTHROCYTE [DISTWIDTH] IN BLOOD BY AUTOMATED COUNT: 14.2 % (ref 10–15)
GLUCOSE SERPL-MCNC: 76 MG/DL (ref 70–99)
HCT VFR BLD AUTO: 25.5 % (ref 35–47)
HGB BLD-MCNC: 8.4 G/DL (ref 11.7–15.7)
MCH RBC QN AUTO: 31 PG (ref 26.5–33)
MCHC RBC AUTO-ENTMCNC: 32.9 G/DL (ref 31.5–36.5)
MCV RBC AUTO: 94 FL (ref 78–100)
P JIROVECII DNA SPEC QL NAA+PROBE: NOT DETECTED
PATH REPORT.COMMENTS IMP SPEC: NORMAL
PATH REPORT.FINAL DX SPEC: NORMAL
PATH REPORT.GROSS SPEC: NORMAL
PATH REPORT.MICROSCOPIC SPEC OTHER STN: NORMAL
PHOSPHATE SERPL-MCNC: 5.8 MG/DL (ref 2.5–4.5)
PLATELET # BLD AUTO: 145 10E3/UL (ref 150–450)
POTASSIUM SERPL-SCNC: 4.6 MMOL/L (ref 3.4–5.3)
RBC # BLD AUTO: 2.71 10E6/UL (ref 3.8–5.2)
SODIUM SERPL-SCNC: 126 MMOL/L (ref 135–145)
WBC # BLD AUTO: 4.8 10E3/UL (ref 4–11)

## 2024-06-29 PROCEDURE — 250N000013 HC RX MED GY IP 250 OP 250 PS 637: Performed by: INTERNAL MEDICINE

## 2024-06-29 PROCEDURE — 80069 RENAL FUNCTION PANEL: CPT | Performed by: INTERNAL MEDICINE

## 2024-06-29 PROCEDURE — 88108 CYTOPATH CONCENTRATE TECH: CPT | Mod: 26 | Performed by: PATHOLOGY

## 2024-06-29 PROCEDURE — 250N000009 HC RX 250: Performed by: INTERNAL MEDICINE

## 2024-06-29 PROCEDURE — 999N000157 HC STATISTIC RCP TIME EA 10 MIN

## 2024-06-29 PROCEDURE — 250N000013 HC RX MED GY IP 250 OP 250 PS 637: Performed by: HOSPITALIST

## 2024-06-29 PROCEDURE — 250N000011 HC RX IP 250 OP 636: Performed by: INTERNAL MEDICINE

## 2024-06-29 PROCEDURE — 94640 AIRWAY INHALATION TREATMENT: CPT | Mod: 76

## 2024-06-29 PROCEDURE — 94640 AIRWAY INHALATION TREATMENT: CPT

## 2024-06-29 PROCEDURE — 85027 COMPLETE CBC AUTOMATED: CPT | Performed by: INTERNAL MEDICINE

## 2024-06-29 PROCEDURE — 88312 SPECIAL STAINS GROUP 1: CPT | Mod: 26 | Performed by: PATHOLOGY

## 2024-06-29 PROCEDURE — 99232 SBSQ HOSP IP/OBS MODERATE 35: CPT | Performed by: INTERNAL MEDICINE

## 2024-06-29 PROCEDURE — 120N000013 HC R&B IMCU

## 2024-06-29 PROCEDURE — 99231 SBSQ HOSP IP/OBS SF/LOW 25: CPT | Mod: 24 | Performed by: SURGERY

## 2024-06-29 PROCEDURE — 71045 X-RAY EXAM CHEST 1 VIEW: CPT

## 2024-06-29 PROCEDURE — 99233 SBSQ HOSP IP/OBS HIGH 50: CPT | Performed by: HOSPITALIST

## 2024-06-29 PROCEDURE — 97535 SELF CARE MNGMENT TRAINING: CPT | Mod: GO

## 2024-06-29 RX ORDER — BUMETANIDE 0.25 MG/ML
2 INJECTION INTRAMUSCULAR; INTRAVENOUS ONCE
Status: COMPLETED | OUTPATIENT
Start: 2024-06-29 | End: 2024-06-29

## 2024-06-29 RX ADMIN — ACETAMINOPHEN 650 MG: 325 TABLET, FILM COATED ORAL at 22:02

## 2024-06-29 RX ADMIN — FAMOTIDINE 10 MG: 10 TABLET, FILM COATED ORAL at 10:30

## 2024-06-29 RX ADMIN — Medication 250 MG: at 22:02

## 2024-06-29 RX ADMIN — AMLODIPINE BESYLATE 10 MG: 10 TABLET ORAL at 10:30

## 2024-06-29 RX ADMIN — BUMETANIDE 2 MG: 0.25 INJECTION, SOLUTION INTRAMUSCULAR; INTRAVENOUS at 15:53

## 2024-06-29 RX ADMIN — CLOPIDOGREL BISULFATE 75 MG: 75 TABLET ORAL at 10:30

## 2024-06-29 RX ADMIN — BUMETANIDE 2 MG: 0.25 INJECTION INTRAMUSCULAR; INTRAVENOUS at 18:47

## 2024-06-29 RX ADMIN — CARVEDILOL 12.5 MG: 12.5 TABLET, FILM COATED ORAL at 18:51

## 2024-06-29 RX ADMIN — NICOTINE 1 PATCH: 14 PATCH, EXTENDED RELEASE TRANSDERMAL at 10:29

## 2024-06-29 RX ADMIN — HYDROMORPHONE HYDROCHLORIDE 2 MG: 2 TABLET ORAL at 22:01

## 2024-06-29 RX ADMIN — IPRATROPIUM BROMIDE AND ALBUTEROL SULFATE 3 ML: .5; 3 SOLUTION RESPIRATORY (INHALATION) at 19:41

## 2024-06-29 RX ADMIN — HYDROMORPHONE HYDROCHLORIDE 2 MG: 2 TABLET ORAL at 10:50

## 2024-06-29 RX ADMIN — HYDROMORPHONE HYDROCHLORIDE 2 MG: 2 TABLET ORAL at 18:51

## 2024-06-29 RX ADMIN — CARVEDILOL 12.5 MG: 12.5 TABLET, FILM COATED ORAL at 10:31

## 2024-06-29 RX ADMIN — IPRATROPIUM BROMIDE AND ALBUTEROL SULFATE 3 ML: .5; 3 SOLUTION RESPIRATORY (INHALATION) at 15:37

## 2024-06-29 RX ADMIN — Medication 250 MG: at 10:30

## 2024-06-29 RX ADMIN — FLUTICASONE FUROATE AND VILANTEROL TRIFENATATE 1 PUFF: 200; 25 POWDER RESPIRATORY (INHALATION) at 10:37

## 2024-06-29 RX ADMIN — HYDROMORPHONE HYDROCHLORIDE 2 MG: 2 TABLET ORAL at 01:50

## 2024-06-29 RX ADMIN — BUMETANIDE 2 MG: 0.25 INJECTION, SOLUTION INTRAMUSCULAR; INTRAVENOUS at 10:29

## 2024-06-29 ASSESSMENT — ACTIVITIES OF DAILY LIVING (ADL)
ADLS_ACUITY_SCORE: 50
ADLS_ACUITY_SCORE: 51
ADLS_ACUITY_SCORE: 50

## 2024-06-29 NOTE — PLAN OF CARE
Occupational Therapy Discharge Summary    Reason for therapy discharge:    Patient/family request discontinuation of services.    Progress towards therapy goal(s). See goals on Care Plan in [facility] electronic health record for goal details.  Goals not met     Therapy recommendation(s):    home with assist in all I/ADLs and resume home OT/PT.

## 2024-06-29 NOTE — PROGRESS NOTES
[facility]     Renal Progress Note       SHORTHAND KEY FOR MY NOTES:  c = with, s = without, p = after, a = before, x = except, asx = asymptomatic, tx = transplant or treatment, sx = symptoms or symptomatic, cx = canceled or culture, rxn = reaction, yday = yesterday, nl = normal, abx = antibiotics, fxn = function, dx = diagnosis, dz = disease, m/h = melena/hematochezia, c/d/l/ha = cramping/dizziness/lightheadedness/headache, d/c = discharge or diarrhea/constipation, f/c/n/v = fevers/chills/nausea/vomiting, cp/sob = chest pain/shortness of breath, tbv = total body volume, rxn = reaction, tdc = tunneled dialysis catheter, pta = prior to admission, hd = hemodialysis, pd = peritoneal dialysis, hhd = home hemodialysis, edw = estimated dry wt         Assessment/Plan:     1.  LIMA/CKD IV.  Pt's BUN/Cr continue to slowly rise.  She is urinating c the aid of diuretics, and she thinks she is urinating more than what's recorded.  No significant uremic sx.  TBV up and Na is trending lower each day.  No obv need for dialysis, but she still may need it.  A.  Daily assessment for HD needs.  B.  Continue bumet 2 mg iv bid.  Will give an additional one-time 2 mg dose today.  C.  Strict I/Os.  D.  Follow labs, uo, sx daily.    2.  Hypoxia.  She states she is breathing ok.  A.  Follow clinically.  B.  Supp o2 prn.    3.  Anemia. Hb is a bit lower today.  It seems dilutional from volume.    A.  Follow hb, clinically.    4.  FEN.  K is fine.  Na is a bit lower.  Phos is rising a bit.  Ca corrects for low alb.  A.  Follow electrolytes daily.    Case d/w Dr. Santos.        Interval History:     Pt is doing ok and does not have any uremic sx.  Denies any f/c/n/v.  No cp/sob.  Swelling persists.          Medications and Allergies:     Current Facility-Administered Medications   Medication Dose Route Frequency Provider Last Rate Last Admin    - MEDICATION INSTRUCTIONS for Dialysis Patients -   Does not apply See Admin  Instructions Love Gross, [facility]        amLODIPine (NORVASC) tablet 10 mg  10 mg Oral Daily Devin Ravi MD   10 mg at 06/29/24 1030    bumetanide (BUMEX) injection 2 mg  2 mg Intravenous bid 08 & 14 Hardy Falcon MD   2 mg at 06/29/24 1029    carvedilol (COREG) tablet 12.5 mg  12.5 mg Oral BID w/meals Devin Ravi MD   12.5 mg at 06/29/24 1031    clopidogrel (PLAVIX) tablet 75 mg  75 mg Oral Daily Devin Ravi MD   75 mg at 06/29/24 1030    famotidine (PEPCID) tablet 10 mg  10 mg Oral Daily Devin Ravi MD   10 mg at 06/29/24 1030    fluticasone-vilanterol (BREO ELLIPTA) 200-25 MCG/ACT inhaler 1 puff  1 puff Inhalation Daily Bruce Ulloa MD   1 puff at 06/29/24 1037    sodium chloride 0.9% DIALYSIS Cath LOCK - BLUE Lumen  1.3-2.6 mL Intracatheter Once Leonor Salomon DO        Followed by    heparin 1000 unit/mL DIALYSIS Cath LOCK - BLUE Lumen  3 mL Intracatheter Once Leonor Salomon DO        sodium chloride 0.9% DIALYSIS Cath LOCK - RED Lumen  1.3-2.6 mL Intracatheter Once Leonor Salomon DO        Followed by    heparin 1000 unit/mL DIALYSIS Cath LOCK - RED Lumen  3 mL Intracatheter Once Leonor Salomon DO        HYDROmorphone (PF) (DILAUDID) injection 0.3 mg  0.3 mg Intravenous Once Devin Ravi MD        ipratropium - albuterol 0.5 mg/2.5 mg/3 mL (DUONEB) neb solution 3 mL  3 mL Nebulization 4x daily Rod Nath MD   3 mL at 06/28/24 1953    nicotine (NICODERM CQ) 14 MG/24HR 24 hr patch 1 patch  1 patch Transdermal Daily Bruce Ulloa MD   1 patch at 06/29/24 1029    polyethylene glycol (MIRALAX) Packet 17 g  17 g Oral Daily Bruce Ulloa MD   17 g at 06/25/24 0906    [Held by provider] rosuvastatin (CRESTOR) tablet 10 mg  10 mg Oral At Bedtime Bruce Ulloa MD   10 mg at 06/22/24 6630    saccharomyces boulardii (FLORASTOR) capsule 250 mg  250 mg Oral BID Bruce Ulloa MD   250 mg at 06/29/24 1035     sodium chloride (PF) 0.9% PF flush 3 mL  3 mL Intracatheter Q8H Bruce Ulloa MD   3 mL at 06/29/24 1029     Allergies   Allergen Reactions    Contrast Dye Anaphylaxis     Pt reported facial and throat swelling with prior CT contrast    Pantoprazole      Protonix caused diffuse edema    Chantix [Varenicline]      Terrible dreams    Penicillins Itching and Rash          Physical Exam:     Vitals were reviewed     , Blood pressure (!) 161/86, pulse 63, temperature 97.8  F (36.6  C), temperature source Oral, resp. rate 26, weight 58 kg (127 lb 13.9 oz), SpO2 97%, not currently breastfeeding.  Wt Readings from Last 3 Encounters:   06/28/24 58 kg (127 lb 13.9 oz)   06/05/24 60.3 kg (132 lb 15 oz)   05/29/24 62.3 kg (137 lb 5.6 oz)     Intake/Output Summary (Last 24 hours) at 6/28/2024 0831  Last data filed at 6/28/2024 0700  Gross per 24 hour   Intake 580 ml   Output 800 ml   Net -220 ml     GENERAL APPEARANCE: pleasant, looks better, alert  RESP:  clear B  CV: RRR, nl S1/S2   ABDOMEN: s/nt/nd  EXTREMITIES/SKIN: 2+ lle edema, R AKA  ACCESS:  + RIJ TDC         Data:     CBC RESULTS:     Recent Labs   Lab 06/29/24  0621 06/28/24  0552 06/27/24  0546 06/26/24  1637 06/26/24  0621 06/25/24  0544 06/24/24  0633   WBC 4.8 4.8 5.3  --  6.0 4.6 5.6   RBC 2.71* 2.93* 3.02*  --  2.93* 2.92* 3.06*   HGB 8.4* 9.0* 9.4* 9.8* 9.2* 9.0* 9.5*  9.5*   HCT 25.5* 27.3* 28.6*  --  27.5* 28.0* 29.2*   * 168 196  --  187 184 201     Basic Metabolic Panel:  Recent Labs   Lab 06/29/24  0621 06/28/24  0552 06/27/24  1709 06/27/24  0546 06/26/24  0621 06/25/24  0544 06/24/24  0633   * 129*  --  130* 133* 136 133*   POTASSIUM 4.6 4.4  --  4.0 3.0* 3.2* 3.8   CHLORIDE 91* 94*  --  94* 97* 99 97*   CO2 26 26  --  27 28 28 25   BUN 49.8* 42.1*  --  37.9* 29.8* 20.6 50.8*   CR 2.97* 2.77*  --  2.76* 2.26* 1.89* 2.84*   GLC 76 78 139* 76 86 83 88   SONIA 7.4* 7.6*  --  7.6* 7.7* 7.6* 7.3*     INRNo lab results found in last 7 days.    Attestation:   I have reviewed today's relevant vital signs, notes, medications, labs and imaging.    Hardy Falcon MD  [facility] - Nephrology  [phone]

## 2024-06-29 NOTE — PROGRESS NOTES
[facility]    Hospitalist Progress Note    Interval History   Sodium down.  Cr up.  Denies cp/sob.  Oriented x3.    Assessment & Plan   Summary: Shirley Hendricks is a 63 year old female with PMH CAD, MI, peripheral vascular disease status angioplasty and right AKA with wound vac, DLE, COPD, CMT disease, tobacco use, CKD stage III, GERD, hypertension, history of B-cell lymphoma, hyperlipidemia, depression, anxiety, who was admitted on 6/21/2024 with acute hypoxic respiratory failure due to a large left pleural effusion, hyperkalemia, and hyponatremia.   Patient recently admitted to [facility] 3/29-4/22/24 with right limb ischemia in setting of severe PAD ultimately requiring right AKA with complicated post op course (including LIMA requiring HD) after which she was discharged to  [facility]U. She was readmitted to [facility] 5/15-5/29/24 with stump eschars requiring surgical debridement and wound VAC placement.   This admission, patient underwent left thoracentesis with 800mL and 1200mL fluid removed. Severe hyperkalemia improved with Lokelma. Patient has been anuric since admission, started on dialysis on 6/23. Chest tube placed 6/23, CT on 6/24 shows ongoing left bronchus obstruction, felt to be mucous plugging per Pulm. Improved after dialysis 6/24, now making urine, O2 needs down to 2-3L.   S/p bronchoscopy 6/26 with mucous plugging removed. Chest tube to be removed 6/26. Lung re-expansion noted 6/26. Renal function stabilizing 6/28.     Left mainstem bronchial obstruction due to mucous plugging s/p bronchoscopy 6/26/2024  Acute hypoxic respiratory failure due to above, improved  Large left pleural effusion, transudative  S/p left thoracentesis 800mL 6/21, 1200mL 6/22  S/p left chest tube 6/23/2024  Right moderate pleural effusion  Patient with increasing shortness of breath for 1-2 weeks PTA. CXR 6/21 in ED reveals complete opacification of the left hemithorax. CT chest-large left  pleural effusion with complete collapse of the left upper and left lower and occlusion of left mainstem bronchus and a moderate size right pleural effusion. Patient underwent thoracentesis 800mL on 6/21, fluid appears transudative. Patient appears markedly volume overloaded. In the ED she initially required 2L O2 but was transitioned to HFNC overnight 6/22 to 6/23 during RRT.  Reports symptomatic improvement with thoracentesis 6/21 and 6/22, but continues to require 10+L O2 or HFNC on 6/23. Discussed with Pulm on 6/23, concern remains left mainstem bronchus obstruction but need to rule out recurrent pleural effusion as cause, so chest tube placed on 6/23. CT chest on 6/24 shows persistent persistent left bronchial obstruction, per Pulm likely mucous plugging.  Bronchoscopy done 6/26 with aspiration of mucous plugs. Chest tube removed 6/26. Patient with chest pain due to lung re-expansion 6/26, CXR 6/27 shows improving lung expansion.   It appears that patient's major turnaround started following bronchoscopy on 6/26.    Breathing much better on 6/29.  resp cx with normal nadine.  No fungal elements seen on KOH.  - Pleural fluid cytology pending.  - Follow pending w/up.  - Supportive care.  - Pulmonology consulted and appreciated    Anuric renal failure on CKD, requiring brief dialysis, improved  Recent acute renal failure in May 2024 needing hemodialysis  Severe hyperkalemia, improved  Hyponatremia  Metabolic acidosis  Recent baseline creatinine has been fluctuating between 1.9-2.8. On admission Creatinine is 3.07, potassium is 7.5, sodium of 128 with bicarb of 17.   In the ER patient did receive albuterol, 2 g calcium gluconate, insulin, Kayexalate 10 g and 40 IV Lasix and 1 L IV fluid bolus. EKG showed sinus bradycardia with prolonged QT. Potassium has been elevated to 7.1 since admission, improved to 5.3 on 6/23 with Lokelma.  Patient has been anuric since admission, no urine output. Etiology of anuric renal  failure likely related to left mainstem bronchus obstruction. Due to ongoing anuria, TDC placed 6/23 and dialyzed 6/23 and 6/24.  Started to making urine 6/25, creatinine has stabilized on 6/28.  - Appreciate Nephrology consult  - Dialysis PRN  - Bumex per nephro.    Possible acute diastolic congestive heart failure exacerbation  Elevated troponin suspect due to volume overload  Patient presents with large left pleural effusion, BNP  elevated to 11.9k. Note history of stress cardiomyopathy in Oct 2023 with EF 30-35% which then resolved on echo Nov 2023.    Patient does not have any significant chest discomfort and troponin are 174 and 177 and trending down to 156. Echo 6/22 shows Ef 60-65%, mid anterior and lateral severe hypokinessis, distal inferior hypokinesis, normal RV function. Suspect large component of renal failure contributing. Felt less likely to be CHF as this was likely due to renal failure.   - Cardiology consult appreciated  - HD per Nephrology, see above  - Tele, weights, I&O  - Diuresis per nephro/cards.    Systolic murmur: Noted systolic murmur 6/23, limited echo 6/24 shows no change from 6/22, likely flow murmur. Murmur improved 6/28.    Anemia of chronic disease  Acute anemia 6/23 likely spurious  Recent baseline hemoglobin has been fluctuating and has been between 8.4-10.1.   Hemoglobin 8.4 on 6/22-->6.4 on 6/23. Patient denies blood loss. Bilirubin is not elevated makes hemolysis less likely. Patient was given 1u pRBC, recheck Hgb was 9.5 via skin draw. The Hgb of 6.4 likely was spurious related to an inaccurate PICC line draw, likely Hgb was 8.5-->9.5 with 1u pRBC.  - [facility] consult appreciated, signed off  - Daily Hgb checks    Hypertension  Hyperlipidemia  History of coronary disease with an MI in 2019  Left heart cath on 10/4/2023 showed-completely occluded D1 with left to left collaterals from distal LAD to D1, moderate CAD involving proximal to mid RCA not significant by IFR and  moderate coronary disease please involving distal small caliber RPL and distal circumflex artery. Last echo  showed EF of 55 to 60% with normal RV and moderate trileaflet aortic sclerosis  - Resume PTA Coreg   - Continue amlodipine 10mg daily    History of peripheral vascular disease status above-knee amputation on the right with wound dressing status washout on   Neuropathic pain  Patient has followed with vascular surgery and has noticed during last hospitalization in May 2024 she underwent washout with wound VAC placement and was discharged on Plavix and Crestor. On exam the amputation stump does not look infected and has wound vac in place   - Continue with PTA Plavix 75 mg daily  - Continues with wound vac, vascular surgery consult appreciated  - Pain control with hydromorphone oral PRN (avoid oxycodone as it is renally cleared)    COPD not in exacerbation  - On exam has no wheezing and continue with as needed symbicort    GERD: Famotidine BID    History of B-cell lymphoma  - Follows with [place] [facility]  - Pending cytology from thoracentesis     Tobacco use  - Continue with nicotine patch    Ongoing stressors  Patient had a recent AKA in 2024 with complicated post op course 2024, and reports   2024.    Clinically Significant Risk Factors         # Hyponatremia: Lowest Na = 126 mmol/L in last 2 days, will monitor as appropriate      # Hypoalbuminemia: Lowest albumin = 2.1 g/dL at 2024  6:21 AM, will monitor as appropriate     # Hypertension: Noted on problem list             #Precipitous drop in Hgb/Hct: Lowest Hgb this hospitalization: 6.4 g/dL. Will continue to monitor and treat/transfuse as appropriate.      # Moderate Malnutrition: based on nutrition assessment      # Financial/Environmental Concerns: none          PT/OT: ordered  Diet: Fluid restriction 1800 ML FLUID  Combination Diet Regular Diet; Renal Diet (dialysis); 2 gm NA Diet; (1800 ml  fr)  Snacks/Supplements Adult: Special K Bar; Between Meals  Snacks/Supplements Adult: Expedite Bottle; Between Meals    DVT Prophylaxis: none  Alvarez Catheter: Not present  Lines: PRESENT      PICC 06/22/24 Double Lumen Left Brachial vein medial access-Site Assessment: WDL  CVC Right Subclavian-Site Assessment: WDL  CVC Double Lumen Right Subclavian Tunneled-Site Assessment: WDL    Cardiac Monitoring: ACTIVE order. Indication: Chest pain/ ACS rule out (24 hours)  Code Status: Full Code    Medically Ready for Discharge: Anticipated in 2-4 Days, probably close to 2 days, likely discharge home once renal function improved      Awil TAZ Santos MD  Hospitalist Service  [facility]      Data reviewed today: I reviewed all new labs and imaging results over the last 24 hours.    Physical Exam   Temp: 97.9  F (36.6  C) Temp src: Oral BP: (!) 149/69 Pulse: 53   Resp: 10 SpO2: 97 % O2 Device: Nasal cannula with humidification Oxygen Delivery: 2 LPM  Vitals:    06/26/24 0623 06/27/24 0557 06/28/24 0600   Weight: 57.3 kg (126 lb 5.2 oz) 57 kg (125 lb 10.6 oz) 58 kg (127 lb 13.9 oz)     Vital Signs with Ranges  Temp:  [97.7  F (36.5  C)-98.3  F (36.8  C)] 97.9  F (36.6  C)  Pulse:  [53-74] 53  Resp:  [10-27] 10  BP: (147-179)/() 149/69  SpO2:  [88 %-100 %] 97 %  I/O last 3 completed shifts:  In: 1200 [P.O.:1200]  Out: 300 [Urine:300]  O2 requirements: Yes HFNC    Constitutional: Female in NAD  HEENT: Eyes nonicteric, oral mucosa moist  Cardiovascular: RRR, normal S1/2, no m/r/g  Respiratory: Noted breath sounds NOMAN, normal R breath sounds RUL and RML, decreased breath sounds LLL and RLL  Vascular: Anasarca with UE and LLE pitting edema, note R AKA with wound vac  GI: Normoactive bowel sounds, nontender  Skin/Integumen: No rashes  Neuro/Psych: Appropriate affect and mood. A&Ox3, moves all extremities    Medications   Current Facility-Administered Medications   Medication Dose Route Frequency Provider  Last Rate Last Admin    No lozenges or gum should be given while patient on BIPAP/AVAPS/AVAPS AE   Does not apply Continuous PRN Devin Ravi MD        Patient may continue current oral medications   Does not apply Continuous PRN Devin Ravi MD         Current Facility-Administered Medications   Medication Dose Route Frequency Provider Last Rate Last Admin    - MEDICATION INSTRUCTIONS for Dialysis Patients -   Does not apply See Admin Instructions Love Gross, [facility]        amLODIPine (NORVASC) tablet 10 mg  10 mg Oral Daily Devin Ravi MD   10 mg at 06/28/24 1031    bumetanide (BUMEX) injection 2 mg  2 mg Intravenous bid 08 & 14 Hardy Falcon MD   2 mg at 06/28/24 1427    carvedilol (COREG) tablet 12.5 mg  12.5 mg Oral BID w/meals Devin Ravi MD   12.5 mg at 06/28/24 1851    clopidogrel (PLAVIX) tablet 75 mg  75 mg Oral Daily Devin Ravi MD   75 mg at 06/28/24 1031    famotidine (PEPCID) tablet 10 mg  10 mg Oral Daily Devin Ravi MD   10 mg at 06/28/24 1031    fluticasone-vilanterol (BREO ELLIPTA) 200-25 MCG/ACT inhaler 1 puff  1 puff Inhalation Daily Bruce Ulloa MD   1 puff at 06/28/24 1036    sodium chloride 0.9% DIALYSIS Cath LOCK - BLUE Lumen  1.3-2.6 mL Intracatheter Once Leonor Salomon DO        Followed by    heparin 1000 unit/mL DIALYSIS Cath LOCK - BLUE Lumen  3 mL Intracatheter Once Leonor Salomon DO        sodium chloride 0.9% DIALYSIS Cath LOCK - RED Lumen  1.3-2.6 mL Intracatheter Once Leonor Salomon DO        Followed by    heparin 1000 unit/mL DIALYSIS Cath LOCK - RED Lumen  3 mL Intracatheter Once Leonor Salomon DO        HYDROmorphone (PF) (DILAUDID) injection 0.3 mg  0.3 mg Intravenous Once Devin Ravi MD        ipratropium - albuterol 0.5 mg/2.5 mg/3 mL (DUONEB) neb solution 3 mL  3 mL Nebulization 4x daily Rod Nath MD   3 mL at 06/28/24 1953    nicotine  (NICODERM CQ) 14 MG/24HR 24 hr patch 1 patch  1 patch Transdermal Daily Bruce Ulloa MD   1 patch at 06/28/24 1037    polyethylene glycol (MIRALAX) Packet 17 g  17 g Oral Daily Bruce Ulloa MD   17 g at 06/25/24 0906    [Held by provider] rosuvastatin (CRESTOR) tablet 10 mg  10 mg Oral At Bedtime Bruce Ulloa MD   10 mg at 06/22/24 2159    saccharomyces boulardii (FLORASTOR) capsule 250 mg  250 mg Oral BID Bruce Ulloa MD   250 mg at 06/28/24 2145    sodium chloride (PF) 0.9% PF flush 3 mL  3 mL Intracatheter Q8H Bruce Ulloa MD   3 mL at 06/28/24 2345       Data   Recent Labs   Lab 06/29/24  0621 06/28/24  0552 06/27/24  1709 06/27/24  0546 06/23/24  0849 06/23/24  0609   WBC 4.8 4.8  --  5.3   < > 6.1   HGB 8.4* 9.0*  --  9.4*   < > 6.4*   MCV 94 93  --  95   < > 98   * 168  --  196   < > 210   * 129*  --  130*   < > 133*   POTASSIUM 4.6 4.4  --  4.0   < > 5.3   CHLORIDE 91* 94*  --  94*   < > 100   CO2 26 26  --  27   < > 19*   BUN 49.8* 42.1*  --  37.9*   < > 90.3*   CR 2.97* 2.77*  --  2.76*   < > 3.74*   ANIONGAP 9 9  --  9   < > 14   SONIA 7.4* 7.6*  --  7.6*   < > 7.3*   GLC 76 78 139* 76   < > 76   ALBUMIN 2.1* 2.2*  --  2.3*   < > 2.6*   PROTTOTAL  --   --   --   --   --  4.5*   BILITOTAL  --   --   --   --   --  <0.2   ALKPHOS  --   --   --   --   --  43   ALT  --   --   --   --   --  18   AST  --   --   --   --   --  15    < > = values in this interval not displayed.       Imaging:   Recent Results (from the past 24 hour(s))   XR Chest Port 1 View    Narrative    EXAM: XR CHEST PORT 1 VIEW  LOCATION: [facility]  DATE: 6/29/2024    INDICATION: pleural effusion  COMPARISON: 06/28/2024      Impression    IMPRESSION: Dual-lumen right central line. Stable cardiomediastinal silhouette. Left PICC line. Pulmonary vascular congestion. Bibasilar atelectasis or infiltrate and left greater than right pleural effusions similar. Atherosclerotic aorta.

## 2024-06-29 NOTE — PROGRESS NOTES
[facility]    Patient very well-known to us from vascular issues.    Has a right BKA that had an ulceration over the anterior surface.  Had been previously treated with wound VAC and now with Aquacel Ag.    Presented with a marked left pleural effusion and bronchial obstruction.  Underwent bronchoscopy to relieve this along with thoracentesis.  Chest x-ray today reveals some evidence of fluid overload but marked improvement in left lung expansion.      Patient has CKD.  Did require dialysis via right jugular catheter during her prolonged last admission.  Catheter reinserted on this admission and underwent 3 dialysis runs for fluid overload and has not required dialysis for several days.  Stable serum creatinine= 2.97.      Right thigh ulceration looks good.  Very healthy granulation tissue and gradually decreasing.  Dressed with Aquacel Ag and Kerlix roll.    Impression: #1.  Right thigh ulceration is gradually improving.  Will plan to allow this to heal by secondary intent and avoid split-thickness skin grafting.     #2.  Widely patent left femoral popliteal in situ bypass graft with strong pulse.       Chadwick Lozano MD

## 2024-06-29 NOTE — PLAN OF CARE
Goal Outcome Evaluation:  1900-0730       Pt is AOX4. AVSS on 1LNC. Pain is managed with PRN Dilaudid x2. Tele is SR. LS clear, diminished at bases. Minimal urine output overnight, pure wick in place and working well. +BS, -BM. Turned and repositioned every 2 hours, patient was cooperative the whole shift. A2 with lift. On renal diet and 1800 mL fluid restriction. With 2 CVC on R chest and a double lumen PICC line on left arm. Rt leg amputee above the knee, with wound on stump- WOC nurse is following. Wound care done every other day. Noted old chest tube sites on left side of the chest, with CDI. Continue plan of care.

## 2024-06-30 ENCOUNTER — APPOINTMENT (OUTPATIENT)
Dept: GENERAL RADIOLOGY | Facility: CLINIC | Age: 64
DRG: 205 | End: 2024-06-30
Attending: THORACIC SURGERY (CARDIOTHORACIC VASCULAR SURGERY)
Payer: COMMERCIAL

## 2024-06-30 LAB
ALBUMIN SERPL BCG-MCNC: 2.2 G/DL (ref 3.5–5.2)
ANION GAP SERPL CALCULATED.3IONS-SCNC: 9 MMOL/L (ref 7–15)
BUN SERPL-MCNC: 52.4 MG/DL (ref 8–23)
CALCIUM SERPL-MCNC: 7.6 MG/DL (ref 8.8–10.2)
CHLORIDE SERPL-SCNC: 91 MMOL/L (ref 98–107)
CREAT SERPL-MCNC: 2.68 MG/DL (ref 0.51–0.95)
DEPRECATED HCO3 PLAS-SCNC: 26 MMOL/L (ref 22–29)
EGFRCR SERPLBLD CKD-EPI 2021: 19 ML/MIN/1.73M2
ERYTHROCYTE [DISTWIDTH] IN BLOOD BY AUTOMATED COUNT: 14 % (ref 10–15)
GLUCOSE SERPL-MCNC: 76 MG/DL (ref 70–99)
HCT VFR BLD AUTO: 24.7 % (ref 35–47)
HGB BLD-MCNC: 8.3 G/DL (ref 11.7–15.7)
MCH RBC QN AUTO: 31.1 PG (ref 26.5–33)
MCHC RBC AUTO-ENTMCNC: 33.6 G/DL (ref 31.5–36.5)
MCV RBC AUTO: 93 FL (ref 78–100)
PHOSPHATE SERPL-MCNC: 5.9 MG/DL (ref 2.5–4.5)
PLATELET # BLD AUTO: 132 10E3/UL (ref 150–450)
POTASSIUM SERPL-SCNC: 4.6 MMOL/L (ref 3.4–5.3)
RBC # BLD AUTO: 2.67 10E6/UL (ref 3.8–5.2)
SODIUM SERPL-SCNC: 126 MMOL/L (ref 135–145)
WBC # BLD AUTO: 4 10E3/UL (ref 4–11)

## 2024-06-30 PROCEDURE — 99232 SBSQ HOSP IP/OBS MODERATE 35: CPT | Performed by: STUDENT IN AN ORGANIZED HEALTH CARE EDUCATION/TRAINING PROGRAM

## 2024-06-30 PROCEDURE — 120N000013 HC R&B IMCU

## 2024-06-30 PROCEDURE — 999N000157 HC STATISTIC RCP TIME EA 10 MIN

## 2024-06-30 PROCEDURE — 250N000013 HC RX MED GY IP 250 OP 250 PS 637: Performed by: INTERNAL MEDICINE

## 2024-06-30 PROCEDURE — 71045 X-RAY EXAM CHEST 1 VIEW: CPT

## 2024-06-30 PROCEDURE — 999N000040 HC STATISTIC CONSULT NO CHARGE VASC ACCESS

## 2024-06-30 PROCEDURE — 82310 ASSAY OF CALCIUM: CPT | Performed by: INTERNAL MEDICINE

## 2024-06-30 PROCEDURE — 85014 HEMATOCRIT: CPT | Performed by: INTERNAL MEDICINE

## 2024-06-30 PROCEDURE — 94640 AIRWAY INHALATION TREATMENT: CPT

## 2024-06-30 PROCEDURE — 250N000009 HC RX 250: Performed by: INTERNAL MEDICINE

## 2024-06-30 PROCEDURE — 250N000011 HC RX IP 250 OP 636: Performed by: INTERNAL MEDICINE

## 2024-06-30 PROCEDURE — 250N000013 HC RX MED GY IP 250 OP 250 PS 637: Performed by: HOSPITALIST

## 2024-06-30 PROCEDURE — 94640 AIRWAY INHALATION TREATMENT: CPT | Mod: 76

## 2024-06-30 PROCEDURE — 99232 SBSQ HOSP IP/OBS MODERATE 35: CPT | Performed by: INTERNAL MEDICINE

## 2024-06-30 RX ORDER — BUMETANIDE 0.25 MG/ML
2 INJECTION INTRAMUSCULAR; INTRAVENOUS EVERY 8 HOURS
Status: DISCONTINUED | OUTPATIENT
Start: 2024-06-30 | End: 2024-07-04

## 2024-06-30 RX ADMIN — CLOPIDOGREL BISULFATE 75 MG: 75 TABLET ORAL at 11:06

## 2024-06-30 RX ADMIN — NICOTINE 1 PATCH: 14 PATCH, EXTENDED RELEASE TRANSDERMAL at 11:05

## 2024-06-30 RX ADMIN — FLUTICASONE FUROATE AND VILANTEROL TRIFENATATE 1 PUFF: 200; 25 POWDER RESPIRATORY (INHALATION) at 12:15

## 2024-06-30 RX ADMIN — BUMETANIDE 2 MG: 0.25 INJECTION, SOLUTION INTRAMUSCULAR; INTRAVENOUS at 16:59

## 2024-06-30 RX ADMIN — BUMETANIDE 2 MG: 0.25 INJECTION, SOLUTION INTRAMUSCULAR; INTRAVENOUS at 23:23

## 2024-06-30 RX ADMIN — FAMOTIDINE 10 MG: 10 TABLET, FILM COATED ORAL at 11:06

## 2024-06-30 RX ADMIN — HYDROMORPHONE HYDROCHLORIDE 2 MG: 2 TABLET ORAL at 11:13

## 2024-06-30 RX ADMIN — HYDROMORPHONE HYDROCHLORIDE 2 MG: 2 TABLET ORAL at 17:09

## 2024-06-30 RX ADMIN — ACETAMINOPHEN 650 MG: 325 TABLET, FILM COATED ORAL at 23:35

## 2024-06-30 RX ADMIN — ACETAMINOPHEN 650 MG: 325 TABLET, FILM COATED ORAL at 02:08

## 2024-06-30 RX ADMIN — HYDROMORPHONE HYDROCHLORIDE 2 MG: 2 TABLET ORAL at 02:07

## 2024-06-30 RX ADMIN — IPRATROPIUM BROMIDE AND ALBUTEROL SULFATE 3 ML: .5; 3 SOLUTION RESPIRATORY (INHALATION) at 08:16

## 2024-06-30 RX ADMIN — HYDROMORPHONE HYDROCHLORIDE 2 MG: 2 TABLET ORAL at 23:35

## 2024-06-30 RX ADMIN — Medication 250 MG: at 11:06

## 2024-06-30 RX ADMIN — ACETAMINOPHEN 650 MG: 325 TABLET, FILM COATED ORAL at 11:13

## 2024-06-30 RX ADMIN — ACETAMINOPHEN 650 MG: 325 TABLET, FILM COATED ORAL at 17:09

## 2024-06-30 RX ADMIN — Medication 250 MG: at 20:37

## 2024-06-30 RX ADMIN — HYDROMORPHONE HYDROCHLORIDE 2 MG: 2 TABLET ORAL at 20:37

## 2024-06-30 RX ADMIN — IPRATROPIUM BROMIDE AND ALBUTEROL SULFATE 3 ML: .5; 3 SOLUTION RESPIRATORY (INHALATION) at 20:19

## 2024-06-30 RX ADMIN — AMLODIPINE BESYLATE 10 MG: 10 TABLET ORAL at 11:06

## 2024-06-30 RX ADMIN — ACETAMINOPHEN 650 MG: 325 TABLET, FILM COATED ORAL at 06:21

## 2024-06-30 RX ADMIN — HYDROMORPHONE HYDROCHLORIDE 2 MG: 2 TABLET ORAL at 06:22

## 2024-06-30 RX ADMIN — CARVEDILOL 12.5 MG: 12.5 TABLET, FILM COATED ORAL at 11:06

## 2024-06-30 RX ADMIN — CARVEDILOL 12.5 MG: 12.5 TABLET, FILM COATED ORAL at 17:00

## 2024-06-30 RX ADMIN — IPRATROPIUM BROMIDE AND ALBUTEROL SULFATE 3 ML: .5; 3 SOLUTION RESPIRATORY (INHALATION) at 12:59

## 2024-06-30 RX ADMIN — IPRATROPIUM BROMIDE AND ALBUTEROL SULFATE 3 ML: .5; 3 SOLUTION RESPIRATORY (INHALATION) at 16:00

## 2024-06-30 ASSESSMENT — ACTIVITIES OF DAILY LIVING (ADL)
ADLS_ACUITY_SCORE: 47
ADLS_ACUITY_SCORE: 51
ADLS_ACUITY_SCORE: 51
ADLS_ACUITY_SCORE: 47
ADLS_ACUITY_SCORE: 47
ADLS_ACUITY_SCORE: 55
ADLS_ACUITY_SCORE: 47
ADLS_ACUITY_SCORE: 55
ADLS_ACUITY_SCORE: 55
ADLS_ACUITY_SCORE: 51
ADLS_ACUITY_SCORE: 51
ADLS_ACUITY_SCORE: 47
ADLS_ACUITY_SCORE: 47
ADLS_ACUITY_SCORE: 51
ADLS_ACUITY_SCORE: 55
ADLS_ACUITY_SCORE: 51
ADLS_ACUITY_SCORE: 51
ADLS_ACUITY_SCORE: 47
ADLS_ACUITY_SCORE: 55
ADLS_ACUITY_SCORE: 47
ADLS_ACUITY_SCORE: 47
ADLS_ACUITY_SCORE: 55
ADLS_ACUITY_SCORE: 51

## 2024-06-30 NOTE — PLAN OF CARE
Goal Outcome Evaluation:      Plan of Care Reviewed With: patient    Overall Patient Progress: improvingOverall Patient Progress: improving    Shift: 7p-7a (transfer from [facility] @ 7pm)  Surgery/POD#: Pt here for hyperkalemia, pleural effusion, acute hypoxic respiratory failure & LIMA. CT removed 6/26.   POD 4 from a bronchoscopy.   Behavior & Aggression: Green  Fall Risk: Yes  Orientation: A&O x4  ABNL VS/O2: VSS, ex HTN & 2L NC (unable to wean off O2)  Tele: NA  ABNL Labs: Na 126, Cr 2.97, Hgb 8.4  Pain Management: dilaudid x3, tylenol x3  Bowel/Bladder: purewick, bowel sounds hypoactive, passing flatus, last BM 6/29  Drains: NA  Lines: PICC SL'd, good blood return  Skin: butt & R stump wound CDI - see wound orders; left flank/old CT site CDI  Diet: Renal/2g Na/1800 ml FR  Activity Level: T/R  Tests/Procedures: NA  Anticipated  DC Date: pending  Significant Information:   Vascular, Nephrology, PT/OT, hospitalist following.

## 2024-06-30 NOTE — PLAN OF CARE
Goal Outcome Evaluation:    Pt has been turn and repositioning, MD did dressing change to right stump, PO dilaudid for pain, VSS, 2 LPM,

## 2024-06-30 NOTE — PROGRESS NOTES
[facility]     Renal Progress Note       SHORTHAND KEY FOR MY NOTES:  c = with, s = without, p = after, a = before, x = except, asx = asymptomatic, tx = transplant or treatment, sx = symptoms or symptomatic, cx = canceled or culture, rxn = reaction, yday = yesterday, nl = normal, abx = antibiotics, fxn = function, dx = diagnosis, dz = disease, m/h = melena/hematochezia, c/d/l/ha = cramping/dizziness/lightheadedness/headache, d/c = discharge or diarrhea/constipation, f/c/n/v = fevers/chills/nausea/vomiting, cp/sob = chest pain/shortness of breath, tbv = total body volume, rxn = reaction, tdc = tunneled dialysis catheter, pta = prior to admission, hd = hemodialysis, pd = peritoneal dialysis, hhd = home hemodialysis, edw = estimated dry wt         Assessment/Plan:     1.  LIMA/CKD IV.  Pt's cr is a bit lower today.  She is TBV up, in part from hypoalbuminemic third-spacing.  She is breathing ok but CXR continues to show some mild pulm vasc congestion and L pleural effusion.  At this point, she does not need dialysis and we will attempt to achieve proper volume status c diuretics.  A.  Increase bumet to 2 mg iv q 8h.  B.  Follow labs, uo, sx daily.  C.  Strict I/Os.    2.  Hyponatremia.  The pt's Na is stable but low.  It's partially related to volume so hopefully the increased diuretics will help.  It's also possibly related to her lung issues.  Aniya will not be helpful since she's on IV bumet.  A.  Follow Na, clinically.    3.  Anemia. Hb is stable in the 9s today.  No signs of bleeding.   A.  Follow hb, clinically.    4.  FEN.  K is fine.  Phos is elevated due to the renal dysfxn.  A.  Follow electrolytes daily.    Case d/w pt/family.        Interval History:     Pt hasn't eaten much today, but states she does have an appetite.  Denies any n/v.  Urinating a bit more yday p increasing IV diuretics.  No cp/sob and swelling unchanged.            Medications and Allergies:     Current  Facility-Administered Medications   Medication Dose Route Frequency Provider Last Rate Last Admin    - MEDICATION INSTRUCTIONS for Dialysis Patients -   Does not apply See Admin Instructions Love Gross, [facility]        amLODIPine (NORVASC) tablet 10 mg  10 mg Oral Daily Devin Ravi MD   10 mg at 06/30/24 1106    bumetanide (BUMEX) injection 2 mg  2 mg Intravenous Q8H Hardy Falcon MD        carvedilol (COREG) tablet 12.5 mg  12.5 mg Oral BID w/meals Devin Ravi MD   12.5 mg at 06/30/24 1106    clopidogrel (PLAVIX) tablet 75 mg  75 mg Oral Daily Devin Ravi MD   75 mg at 06/30/24 1106    famotidine (PEPCID) tablet 10 mg  10 mg Oral Daily Devin Ravi MD   10 mg at 06/30/24 1106    fluticasone-vilanterol (BREO ELLIPTA) 200-25 MCG/ACT inhaler 1 puff  1 puff Inhalation Daily Bruce Ulloa MD   1 puff at 06/30/24 1215    sodium chloride 0.9% DIALYSIS Cath LOCK - BLUE Lumen  1.3-2.6 mL Intracatheter Once Leonor Salomon DO        Followed by    heparin 1000 unit/mL DIALYSIS Cath LOCK - BLUE Lumen  3 mL Intracatheter Once Leonor Salomon DO        sodium chloride 0.9% DIALYSIS Cath LOCK - RED Lumen  1.3-2.6 mL Intracatheter Once Leonor Salomon DO        Followed by    heparin 1000 unit/mL DIALYSIS Cath LOCK - RED Lumen  3 mL Intracatheter Once Leonor Salomon DO        HYDROmorphone (PF) (DILAUDID) injection 0.3 mg  0.3 mg Intravenous Once Devin Ravi MD        ipratropium - albuterol 0.5 mg/2.5 mg/3 mL (DUONEB) neb solution 3 mL  3 mL Nebulization 4x daily Rod Nath MD   3 mL at 06/30/24 1600    nicotine (NICODERM CQ) 14 MG/24HR 24 hr patch 1 patch  1 patch Transdermal Daily Bruce Ulloa MD   1 patch at 06/30/24 1105    polyethylene glycol (MIRALAX) Packet 17 g  17 g Oral Daily Bruce Ulloa MD   17 g at 06/25/24 0906    [Held by provider] rosuvastatin (CRESTOR) tablet 10 mg  10 mg Oral At Bedtime Artemio  MD Bruce   10 mg at 06/22/24 2159    saccharomyces boulardii (FLORASTOR) capsule 250 mg  250 mg Oral BID Bruce Ulloa MD   250 mg at 06/30/24 1106    sodium chloride (PF) 0.9% PF flush 3 mL  3 mL Intracatheter Q8H Bruce Ulloa MD   3 mL at 06/29/24 1554     Allergies   Allergen Reactions    Contrast Dye Anaphylaxis     Pt reported facial and throat swelling with prior CT contrast    Pantoprazole      Protonix caused diffuse edema    Chantix [Varenicline]      Terrible dreams    Penicillins Itching and Rash          Physical Exam:     Vitals were reviewed     , Blood pressure (!) 161/68, pulse 56, temperature 97.5  F (36.4  C), temperature source Oral, resp. rate 20, weight 50.6 kg (111 lb 8.8 oz), SpO2 97%, not currently breastfeeding.  Wt Readings from Last 3 Encounters:   06/30/24 50.6 kg (111 lb 8.8 oz)   06/05/24 60.3 kg (132 lb 15 oz)   05/29/24 62.3 kg (137 lb 5.6 oz)     Intake/Output Summary (Last 24 hours) at 6/30/2024 1618  Last data filed at 6/30/2024 1100  Gross per 24 hour   Intake 120 ml   Output 800 ml   Net -680 ml     GENERAL APPEARANCE: pleasant, looks ok, alert  RESP:  clear anteriorly, decreased laterally B  CV: RRR, nl S1/S2   ABDOMEN: s/nt/nd  EXTREMITIES/SKIN: 2+ lle edema, R AKA  ACCESS:  + RIJ TDC         Data:     CBC RESULTS:     Recent Labs   Lab 06/30/24  0629 06/29/24  0621 06/28/24  0552 06/27/24  0546 06/26/24  1637 06/26/24  0621 06/25/24  0544   WBC 4.0 4.8 4.8 5.3  --  6.0 4.6   RBC 2.67* 2.71* 2.93* 3.02*  --  2.93* 2.92*   HGB 8.3* 8.4* 9.0* 9.4* 9.8* 9.2* 9.0*   HCT 24.7* 25.5* 27.3* 28.6*  --  27.5* 28.0*   * 145* 168 196  --  187 184     Basic Metabolic Panel:  Recent Labs   Lab 06/30/24  0629 06/29/24  0621 06/28/24  0552 06/27/24  1709 06/27/24  0546 06/26/24  0621 06/25/24  0544   * 126* 129*  --  130* 133* 136   POTASSIUM 4.6 4.6 4.4  --  4.0 3.0* 3.2*   CHLORIDE 91* 91* 94*  --  94* 97* 99   CO2 26 26 26  --  27 28 28   BUN 52.4* 49.8* 42.1*  --  37.9*  29.8* 20.6   CR 2.68* 2.97* 2.77*  --  2.76* 2.26* 1.89*   GLC 76 76 78 139* 76 86 83   SONIA 7.6* 7.4* 7.6*  --  7.6* 7.7* 7.6*     INRNo lab results found in last 7 days.   Attestation:   I have reviewed today's relevant vital signs, notes, medications, labs and imaging.    Hardy Falcon MD  [facility] - Nephrology  [phone]

## 2024-06-30 NOTE — PROGRESS NOTES
[facility]    Hospitalist Progress Note    Interval History   Sodium still low, 126 which is stable from yesterday  Cr down to 2.68, possible this is her new baseline  Denies cp/sob.  Oriented x3.    Assessment & Plan   Summary: Shirley Hendricks is a 63 year old female with PMH CAD, MI, peripheral vascular disease status angioplasty and right AKA with wound vac, DLE, COPD, CMT disease, tobacco use, CKD stage III, GERD, hypertension, history of B-cell lymphoma, hyperlipidemia, depression, anxiety, who was admitted on 6/21/2024 with acute hypoxic respiratory failure due to a large left pleural effusion, hyperkalemia, and hyponatremia.   Patient recently admitted to [facility] 3/29-4/22/24 with right limb ischemia in setting of severe PAD ultimately requiring right AKA with complicated post op course (including LIMA requiring HD) after which she was discharged to  [facility]U. She was readmitted to [facility] 5/15-5/29/24 with stump eschars requiring surgical debridement and wound VAC placement.   This admission, patient underwent left thoracentesis with 800mL and 1200mL fluid removed. Severe hyperkalemia improved with Lokelma. Patient had been anuric since admission, started on dialysis on 6/23 now with some renal recovery. Chest tube placed 6/23, CT on 6/24 shows ongoing left bronchus obstruction, felt to be mucous plugging per Pulm. Improved after dialysis 6/24, now making urine, O2 needs down to 2-3L.   S/p bronchoscopy 6/26 with mucous plugging removed. Chest tube to be removed 6/26. Lung re-expansion noted 6/26. Renal function stabilizing 6/28.     Left mainstem bronchial obstruction due to mucous plugging s/p bronchoscopy 6/26/2024  Acute hypoxic respiratory failure due to above, improved  Large left pleural effusion, transudative  S/p left thoracentesis 800mL 6/21, 1200mL 6/22  S/p left chest tube 6/23/2024  Right moderate pleural effusion  Patient with increasing shortness of breath  for 1-2 weeks PTA. CXR 6/21 in ED reveals complete opacification of the left hemithorax. CT chest-large left pleural effusion with complete collapse of the left upper and left lower and occlusion of left mainstem bronchus and a moderate size right pleural effusion. Patient underwent thoracentesis 800mL on 6/21, fluid appears transudative. Patient appears markedly volume overloaded. In the ED she initially required 2L O2 but was transitioned to HFNC overnight 6/22 to 6/23 during RRT.  Reports symptomatic improvement with thoracentesis 6/21 and 6/22, but continues to require 10+L O2 or HFNC on 6/23. Discussed with Pulm on 6/23, concern remains left mainstem bronchus obstruction but need to rule out recurrent pleural effusion as cause, so chest tube placed on 6/23. CT chest on 6/24 shows persistent persistent left bronchial obstruction, per Pulm likely mucous plugging.  Bronchoscopy done 6/26 with aspiration of mucous plugs. Chest tube removed 6/26. Patient with chest pain due to lung re-expansion 6/26, CXR 6/27 shows improving lung expansion.   It appears that patient's major turnaround started following bronchoscopy on 6/26.    Breathing continues to improve on 6/30. Weaning oxygen.  resp cx with normal nadine.  No fungal elements seen on KOH.  - Pleural fluid cytology pending.  - Follow pending w/up.  - Supportive care.  - Pulmonology consulted and appreciated    Anuric renal failure on CKD, requiring brief dialysis, improved  Recent acute renal failure in May 2024 needing hemodialysis  Severe hyperkalemia, improved  Hyponatremia  Metabolic acidosis  Recent baseline creatinine has been fluctuating between 1.9-2.8. On admission Creatinine is 3.07, potassium is 7.5, sodium of 128 with bicarb of 17.   In the ER patient did receive albuterol, 2 g calcium gluconate, insulin, Kayexalate 10 g and 40 IV Lasix and 1 L IV fluid bolus. EKG showed sinus bradycardia with prolonged QT. Potassium has been elevated to 7.1 since  admission, improved to 5.3 on 6/23 with Lokelma.  Patient has been anuric since admission, no urine output. Etiology of anuric renal failure likely related to left mainstem bronchus obstruction. Due to ongoing anuria, TDC placed 6/23 and dialyzed 6/23 and 6/24.  Started to making urine 6/25, creatinine has stabilized on 6/28 and now fluctuating 2.7-3  - Appreciate Nephrology consult  - Dialysis PRN  - Bumex per nephro.    Possible acute diastolic congestive heart failure exacerbation  Elevated troponin suspect due to volume overload  Patient presents with large left pleural effusion, BNP  elevated to 11.9k. Note history of stress cardiomyopathy in Oct 2023 with EF 30-35% which then resolved on echo Nov 2023.    Patient does not have any significant chest discomfort and troponin are 174 and 177 and trending down to 156. Echo 6/22 shows Ef 60-65%, mid anterior and lateral severe hypokinessis, distal inferior hypokinesis, normal RV function. Suspect large component of renal failure contributing. Felt less likely to be CHF as this was likely due to renal failure.   - Cardiology consult appreciated  - HD per Nephrology, see above  - Tele, weights, I&O  - Diuresis per nephro/cards.    Systolic murmur: Noted systolic murmur 6/23, limited echo 6/24 shows no change from 6/22, likely flow murmur. Murmur improved 6/28.    Anemia of chronic disease  Acute anemia 6/23 likely spurious  Recent baseline hemoglobin has been fluctuating and has been between 8.4-10.1.   Hemoglobin 8.4 on 6/22-->6.4 on 6/23. Patient denies blood loss. Bilirubin is not elevated makes hemolysis less likely. Patient was given 1u pRBC, recheck Hgb was 9.5 via skin draw. The Hgb of 6.4 likely was spurious related to an inaccurate PICC line draw, likely Hgb was 8.5-->9.5 with 1u pRBC.  - [facility] consult appreciated, signed off  - Daily Hgb checks    Hypertension  Hyperlipidemia  History of coronary disease with an MI in 2019  Left heart cath on 10/4/2023  showed-completely occluded D1 with left to left collaterals from distal LAD to D1, moderate CAD involving proximal to mid RCA not significant by IFR and moderate coronary disease please involving distal small caliber RPL and distal circumflex artery. Last echo  showed EF of 55 to 60% with normal RV and moderate trileaflet aortic sclerosis  - Resume PTA Coreg   - Continue amlodipine 10mg daily    History of peripheral vascular disease status above-knee amputation on the right with wound dressing status washout on   Neuropathic pain  Patient has followed with vascular surgery and has noticed during last hospitalization in May 2024 she underwent washout with wound VAC placement and was discharged on Plavix and Crestor. On exam the amputation stump is currently wrapped, no obvious discharge from what I can see on exam   - Continue with PTA Plavix 75 mg daily  - wound vac now removed, vascular surgery consult appreciated  - Pain control with hydromorphone oral PRN (avoid oxycodone as it is renally cleared)    COPD not in exacerbation  - On exam has no wheezing and continue with as needed symbicort    GERD: Famotidine BID    History of B-cell lymphoma  - Follows with [place] [facility]  - Pending cytology from thoracentesis     Tobacco use  - Continue with nicotine patch    Ongoing stressors  Patient had a recent AKA in 2024 with complicated post op course 2024, and reports   2024.    Clinically Significant Risk Factors         # Hyponatremia: Lowest Na = 126 mmol/L in last 2 days, will monitor as appropriate      # Hypoalbuminemia: Lowest albumin = 2.1 g/dL at 2024  6:21 AM, will monitor as appropriate     # Hypertension: Noted on problem list             #Precipitous drop in Hgb/Hct: Lowest Hgb this hospitalization: 6.4 g/dL. Will continue to monitor and treat/transfuse as appropriate.      # Moderate Malnutrition: based on nutrition assessment      #  Financial/Environmental Concerns: none          PT/OT: ordered  Diet: Fluid restriction 1800 ML FLUID  Combination Diet Regular Diet; Renal Diet (dialysis); 2 gm NA Diet; (1800 ml fr)  Snacks/Supplements Adult: [employer] Bar; Between Meals  Snacks/Supplements Adult: Expedite Bottle; Between Meals    DVT Prophylaxis: none  Alvarez Catheter: Not present  Lines: PRESENT      PICC 06/22/24 Double Lumen Left Brachial vein medial access-Site Assessment: WDL  CVC Right Subclavian-Site Assessment: WDL  CVC Double Lumen Right Subclavian Tunneled-Site Assessment: WDL    Cardiac Monitoring: None  Code Status: Full Code    Medically Ready for Discharge: Anticipated in 2-4 Days, probably close to 2 days, likely discharge home once renal function improved      Nayan Funes MD  Hospitalist Service  [facility]      Data reviewed today: I reviewed all new labs and imaging results over the last 24 hours.    Physical Exam   Temp: 97.5  F (36.4  C) Temp src: Oral BP: (!) 161/68 Pulse: 56   Resp: 20 SpO2: 92 % O2 Device: Nasal cannula Oxygen Delivery: 2 LPM  Vitals:    06/27/24 0557 06/28/24 0600 06/30/24 0605   Weight: 57 kg (125 lb 10.6 oz) 58 kg (127 lb 13.9 oz) 50.6 kg (111 lb 8.8 oz)     Vital Signs with Ranges  Temp:  [97.5  F (36.4  C)-97.9  F (36.6  C)] 97.5  F (36.4  C)  Pulse:  [56-60] 56  Resp:  [19-20] 20  BP: (136-169)/(68-77) 161/68  SpO2:  [92 %-98 %] 92 %  I/O last 3 completed shifts:  In: 120 [P.O.:120]  Out: 800 [Urine:800]  O2 requirements: Yes HFNC    Constitutional: Female in NAD  HEENT: Eyes nonicteric, oral mucosa moist  Cardiovascular: RRR, normal S1/2, no m/r/g  Respiratory: Noted breath sounds NOMAN, normal R breath sounds RUL and RML, decreased breath sounds LLL and RLL  Vascular: Anasarca with UE and LLE pitting edema, note R AKA stump, wrapped  GI: Normoactive bowel sounds, nontender  Skin/Integumen: No rashes. LUE PICC and right upper chest portacath in place without  pain/redness/swellilng  Neuro/Psych: Appropriate affect and mood. A&Ox3, moves all extremities    Medications   Current Facility-Administered Medications   Medication Dose Route Frequency Provider Last Rate Last Admin    No lozenges or gum should be given while patient on BIPAP/AVAPS/AVAPS AE   Does not apply Continuous PRN Devin Ravi MD        Patient may continue current oral medications   Does not apply Continuous PRN Devin Ravi MD         Current Facility-Administered Medications   Medication Dose Route Frequency Provider Last Rate Last Admin    - MEDICATION INSTRUCTIONS for Dialysis Patients -   Does not apply See Admin Instructions [facility], [facility]        amLODIPine (NORVASC) tablet 10 mg  10 mg Oral Daily Devin Ravi MD   10 mg at 06/30/24 1106    bumetanide (BUMEX) injection 2 mg  2 mg Intravenous Q8H Hardy Falcon MD        carvedilol (COREG) tablet 12.5 mg  12.5 mg Oral BID w/meals Devin Ravi MD   12.5 mg at 06/30/24 1106    clopidogrel (PLAVIX) tablet 75 mg  75 mg Oral Daily Devin Ravi MD   75 mg at 06/30/24 1106    famotidine (PEPCID) tablet 10 mg  10 mg Oral Daily Devin Ravi MD   10 mg at 06/30/24 1106    fluticasone-vilanterol (BREO ELLIPTA) 200-25 MCG/ACT inhaler 1 puff  1 puff Inhalation Daily Bruce Ulloa MD   1 puff at 06/30/24 1215    sodium chloride 0.9% DIALYSIS Cath LOCK - BLUE Lumen  1.3-2.6 mL Intracatheter Once Leonor Salomon,         Followed by    heparin 1000 unit/mL DIALYSIS Cath LOCK - BLUE Lumen  3 mL Intracatheter Once Leonor Salomon DO        sodium chloride 0.9% DIALYSIS Cath LOCK - RED Lumen  1.3-2.6 mL Intracatheter Once Leonor Salomon DO        Followed by    heparin 1000 unit/mL DIALYSIS Cath LOCK - RED Lumen  3 mL Intracatheter Once Leonor Salomon DO        HYDROmorphone (PF) (DILAUDID) injection 0.3 mg  0.3 mg Intravenous Once Devin Ravi  MD Lis        ipratropium - albuterol 0.5 mg/2.5 mg/3 mL (DUONEB) neb solution 3 mL  3 mL Nebulization 4x daily Rod Nath MD   3 mL at 06/30/24 1259    nicotine (NICODERM CQ) 14 MG/24HR 24 hr patch 1 patch  1 patch Transdermal Daily Bruce Ulloa MD   1 patch at 06/30/24 1105    polyethylene glycol (MIRALAX) Packet 17 g  17 g Oral Daily Bruce Ulloa MD   17 g at 06/25/24 0906    [Held by provider] rosuvastatin (CRESTOR) tablet 10 mg  10 mg Oral At Bedtime Bruce Ulloa MD   10 mg at 06/22/24 2159    saccharomyces boulardii (FLORASTOR) capsule 250 mg  250 mg Oral BID Bruce Ulloa MD   250 mg at 06/30/24 1106    sodium chloride (PF) 0.9% PF flush 3 mL  3 mL Intracatheter Q8H Bruce Ulloa MD   3 mL at 06/29/24 1554       Data   Recent Labs   Lab 06/30/24  0629 06/29/24  0621 06/28/24  0552   WBC 4.0 4.8 4.8   HGB 8.3* 8.4* 9.0*   MCV 93 94 93   * 145* 168   * 126* 129*   POTASSIUM 4.6 4.6 4.4   CHLORIDE 91* 91* 94*   CO2 26 26 26   BUN 52.4* 49.8* 42.1*   CR 2.68* 2.97* 2.77*   ANIONGAP 9 9 9   SONIA 7.6* 7.4* 7.6*   GLC 76 76 78   ALBUMIN 2.2* 2.1* 2.2*       Imaging:   Recent Results (from the past 24 hour(s))   XR Chest Port 1 View    Narrative    EXAM: XR CHEST PORT 1 VIEW  LOCATION: [facility]  DATE: 6/30/2024    INDICATION: pleural effusion  COMPARISON: 06/29/2024      Impression    IMPRESSION: Left PICC line and dual-lumen right central line. Stable cardia mediastinal silhouette. Pulmonary vascular congestion. Bilateral infiltrates (left greater than right). Moderate left and small right pleural effusion. Increased left effusion   compared to prior. Skin folds overlying the left chest. Atherosclerotic aorta

## 2024-06-30 NOTE — PLAN OF CARE
Goal Outcome Evaluation:      Plan of Care Reviewed With: patient    Overall Patient Progress: improvingOverall Patient Progress: improving     Shift: 6/30/24 7a-7p   Surgery/POD#: Pt here for hyperkalemia, pleural effusion, acute hypoxic respiratory failure & LIMA. CT removed 6/26.   Behavior & Aggression: Green  Fall Risk: Yes  Orientation: A&O x4  ABNL VS/O2: VSS, ex HTN & 2L NC (unable to wean off O2)  Tele: NA  ABNL Labs: Na 126, Cr 2.68, Hgb 8.3  Pain Management: dilaudid x2, tylenol x2  Bowel/Bladder: purewick, bowel sounds hypoactive, passing flatus, last BM 6/29  Drains: NA  Lines: PICC double lumenSL'd, good blood return, R tunneled cath  Skin: butt & R stump wound dressing change done today; left flank/old CT site CDI  Diet: Renal/2g Na/1800 ml FR  Activity Level: T/R  Tests/Procedures: chest xray increase in pleural effusion  Anticipated  DC Date: 2-4 days  Significant Information: Has 3+ edema to L foot, ankle.  Increase in bumex today.    Vascular, Nephrology, PT/OT, hospitalist following.

## 2024-06-30 NOTE — PROGRESS NOTES
.Admission/Transfer from: [facility]  2 RN skin assessment completed. Yes - done w/ Franko Nicole  Significant findings include: see chart  WOC Nurse Consult Ordered? No

## 2024-07-01 ENCOUNTER — APPOINTMENT (OUTPATIENT)
Dept: GENERAL RADIOLOGY | Facility: CLINIC | Age: 64
DRG: 205 | End: 2024-07-01
Attending: THORACIC SURGERY (CARDIOTHORACIC VASCULAR SURGERY)
Payer: COMMERCIAL

## 2024-07-01 LAB
ALBUMIN SERPL BCG-MCNC: 2.3 G/DL (ref 3.5–5.2)
ANION GAP SERPL CALCULATED.3IONS-SCNC: 7 MMOL/L (ref 7–15)
BUN SERPL-MCNC: 59.4 MG/DL (ref 8–23)
CALCIUM SERPL-MCNC: 7.4 MG/DL (ref 8.8–10.2)
CHLORIDE SERPL-SCNC: 91 MMOL/L (ref 98–107)
CREAT SERPL-MCNC: 2.62 MG/DL (ref 0.51–0.95)
DEPRECATED HCO3 PLAS-SCNC: 27 MMOL/L (ref 22–29)
EGFRCR SERPLBLD CKD-EPI 2021: 20 ML/MIN/1.73M2
ERYTHROCYTE [DISTWIDTH] IN BLOOD BY AUTOMATED COUNT: 13.7 % (ref 10–15)
GLUCOSE SERPL-MCNC: 72 MG/DL (ref 70–99)
HCT VFR BLD AUTO: 24.3 % (ref 35–47)
HGB BLD-MCNC: 7.9 G/DL (ref 11.7–15.7)
MCH RBC QN AUTO: 29.6 PG (ref 26.5–33)
MCHC RBC AUTO-ENTMCNC: 32.5 G/DL (ref 31.5–36.5)
MCV RBC AUTO: 91 FL (ref 78–100)
PHOSPHATE SERPL-MCNC: 6.3 MG/DL (ref 2.5–4.5)
PLATELET # BLD AUTO: 115 10E3/UL (ref 150–450)
POTASSIUM SERPL-SCNC: 4.9 MMOL/L (ref 3.4–5.3)
RBC # BLD AUTO: 2.67 10E6/UL (ref 3.8–5.2)
SCANNED LAB RESULT: NORMAL
SODIUM SERPL-SCNC: 125 MMOL/L (ref 135–145)
WBC # BLD AUTO: 3.7 10E3/UL (ref 4–11)

## 2024-07-01 PROCEDURE — 85027 COMPLETE CBC AUTOMATED: CPT | Performed by: INTERNAL MEDICINE

## 2024-07-01 PROCEDURE — 99232 SBSQ HOSP IP/OBS MODERATE 35: CPT | Performed by: HOSPITALIST

## 2024-07-01 PROCEDURE — 250N000011 HC RX IP 250 OP 636: Performed by: INTERNAL MEDICINE

## 2024-07-01 PROCEDURE — G0463 HOSPITAL OUTPT CLINIC VISIT: HCPCS | Mod: 25

## 2024-07-01 PROCEDURE — 250N000009 HC RX 250: Performed by: INTERNAL MEDICINE

## 2024-07-01 PROCEDURE — 80069 RENAL FUNCTION PANEL: CPT | Performed by: INTERNAL MEDICINE

## 2024-07-01 PROCEDURE — 250N000013 HC RX MED GY IP 250 OP 250 PS 637: Performed by: INTERNAL MEDICINE

## 2024-07-01 PROCEDURE — 94640 AIRWAY INHALATION TREATMENT: CPT | Mod: 76

## 2024-07-01 PROCEDURE — 250N000011 HC RX IP 250 OP 636: Performed by: HOSPITALIST

## 2024-07-01 PROCEDURE — 250N000013 HC RX MED GY IP 250 OP 250 PS 637: Performed by: HOSPITALIST

## 2024-07-01 PROCEDURE — P9047 ALBUMIN (HUMAN), 25%, 50ML: HCPCS | Performed by: INTERNAL MEDICINE

## 2024-07-01 PROCEDURE — 120N000001 HC R&B MED SURG/OB

## 2024-07-01 PROCEDURE — 999N000157 HC STATISTIC RCP TIME EA 10 MIN

## 2024-07-01 PROCEDURE — 94640 AIRWAY INHALATION TREATMENT: CPT

## 2024-07-01 PROCEDURE — 99232 SBSQ HOSP IP/OBS MODERATE 35: CPT | Performed by: INTERNAL MEDICINE

## 2024-07-01 PROCEDURE — 71045 X-RAY EXAM CHEST 1 VIEW: CPT

## 2024-07-01 RX ORDER — ALBUMIN (HUMAN) 12.5 G/50ML
12.5 SOLUTION INTRAVENOUS ONCE
Status: COMPLETED | OUTPATIENT
Start: 2024-07-01 | End: 2024-07-01

## 2024-07-01 RX ORDER — CALCIUM ACETATE 667 MG/1
667 CAPSULE ORAL
Status: DISCONTINUED | OUTPATIENT
Start: 2024-07-01 | End: 2024-07-08 | Stop reason: HOSPADM

## 2024-07-01 RX ADMIN — BUMETANIDE 2 MG: 0.25 INJECTION, SOLUTION INTRAMUSCULAR; INTRAVENOUS at 08:35

## 2024-07-01 RX ADMIN — Medication 250 MG: at 21:59

## 2024-07-01 RX ADMIN — HYDROMORPHONE HYDROCHLORIDE 2 MG: 2 TABLET ORAL at 04:23

## 2024-07-01 RX ADMIN — CARVEDILOL 12.5 MG: 12.5 TABLET, FILM COATED ORAL at 18:27

## 2024-07-01 RX ADMIN — CALCIUM ACETATE 667 MG: 667 CAPSULE ORAL at 18:27

## 2024-07-01 RX ADMIN — AMLODIPINE BESYLATE 10 MG: 10 TABLET ORAL at 08:36

## 2024-07-01 RX ADMIN — FLUTICASONE FUROATE AND VILANTEROL TRIFENATATE 1 PUFF: 200; 25 POWDER RESPIRATORY (INHALATION) at 09:05

## 2024-07-01 RX ADMIN — Medication 250 MG: at 08:36

## 2024-07-01 RX ADMIN — IPRATROPIUM BROMIDE AND ALBUTEROL SULFATE 3 ML: .5; 3 SOLUTION RESPIRATORY (INHALATION) at 08:44

## 2024-07-01 RX ADMIN — ALBUMIN HUMAN 12.5 G: 0.25 SOLUTION INTRAVENOUS at 17:10

## 2024-07-01 RX ADMIN — ONDANSETRON 4 MG: 4 TABLET, ORALLY DISINTEGRATING ORAL at 19:41

## 2024-07-01 RX ADMIN — ACETAMINOPHEN 650 MG: 325 TABLET, FILM COATED ORAL at 04:24

## 2024-07-01 RX ADMIN — CALCIUM ACETATE 667 MG: 667 CAPSULE ORAL at 14:41

## 2024-07-01 RX ADMIN — NICOTINE 1 PATCH: 14 PATCH, EXTENDED RELEASE TRANSDERMAL at 08:37

## 2024-07-01 RX ADMIN — FAMOTIDINE 10 MG: 10 TABLET, FILM COATED ORAL at 08:36

## 2024-07-01 RX ADMIN — HYDROMORPHONE HYDROCHLORIDE 2 MG: 2 TABLET ORAL at 21:59

## 2024-07-01 RX ADMIN — IPRATROPIUM BROMIDE AND ALBUTEROL SULFATE 3 ML: .5; 3 SOLUTION RESPIRATORY (INHALATION) at 16:03

## 2024-07-01 RX ADMIN — HYDROMORPHONE HYDROCHLORIDE 2 MG: 2 TABLET ORAL at 11:12

## 2024-07-01 RX ADMIN — CLOPIDOGREL BISULFATE 75 MG: 75 TABLET ORAL at 08:36

## 2024-07-01 RX ADMIN — CARVEDILOL 12.5 MG: 12.5 TABLET, FILM COATED ORAL at 08:36

## 2024-07-01 RX ADMIN — BUMETANIDE 2 MG: 0.25 INJECTION, SOLUTION INTRAMUSCULAR; INTRAVENOUS at 18:27

## 2024-07-01 RX ADMIN — ACETAMINOPHEN 650 MG: 325 TABLET, FILM COATED ORAL at 21:59

## 2024-07-01 RX ADMIN — ACETAMINOPHEN 650 MG: 325 TABLET, FILM COATED ORAL at 11:12

## 2024-07-01 ASSESSMENT — ACTIVITIES OF DAILY LIVING (ADL)
ADLS_ACUITY_SCORE: 47
ADLS_ACUITY_SCORE: 51
ADLS_ACUITY_SCORE: 47
ADLS_ACUITY_SCORE: 51
ADLS_ACUITY_SCORE: 51
ADLS_ACUITY_SCORE: 47
ADLS_ACUITY_SCORE: 47

## 2024-07-01 NOTE — PLAN OF CARE
Date & Time: 7/1/24, 2688-7025  Surgery/POD#: here for hyperkalemia, pleural effusion, acute hypoxic respiratory failure & LIMA. S/p bronchoscopy. Chest removed 6/26  Behavior & Aggression: green  Fall Risk: Yes   Orientation:A&Ox4   ABNL VS/O2:VSS on 2L NC  ABNL Labs: Na:125, Hgb:7.9, Cr; 2.62  Pain Management:  Bowel/Bladder: Incontinent, purewick in place.  Drains: PICC SL, CVC in place  Wounds/incisions: R stump wound CDI. Wound to coccyx CDI. CT site CDI.  Diet:Renal diet/ 1800mL FR  Activity Level: Turn and repo, shifted weight  Tests/Procedures: na  Anticipated  DC Date: pending inprovement  Significant Information: Refused repositioning. +3 edema to LLE. Dressing changes done by WOC today. Plan to continue current care.

## 2024-07-01 NOTE — PROGRESS NOTES
[facility]    Hospitalist Progress Note    Interval History   Assumed care.  No complaints, eating, bowels moving.  Right AKA, nonambulatory.  Denies dyspnea, cough, chest pain.    Assessment & Plan   Summary: Shirley Hendricks is a 63 year old female with PMH CAD, MI, peripheral vascular disease status angioplasty and right AKA with wound vac, DLE, COPD, CMT disease, tobacco use, CKD stage III, GERD, hypertension, history of B-cell lymphoma, hyperlipidemia, depression, anxiety, who was admitted on 6/21/2024 with acute hypoxic respiratory failure due to a large left pleural effusion, hyperkalemia, and hyponatremia.   Patient recently admitted to [facility] 3/29-4/22/24 with right limb ischemia in setting of severe PAD ultimately requiring right AKA with complicated post op course (including LIMA requiring HD) after which she was discharged to  ARU. She was readmitted to [facility] 5/15-5/29/24 with stump eschars requiring surgical debridement and wound VAC placement.   This admission, patient underwent left thoracentesis with 800mL and 1200mL fluid removed. Severe hyperkalemia improved with Lokelma. Patient had been anuric since admission, started on dialysis on 6/23 now with some renal recovery. Chest tube placed 6/23, CT on 6/24 shows ongoing left bronchus obstruction, felt to be mucous plugging per Pulm. Improved after dialysis 6/24, now making urine, O2 needs down to 2-3L.   S/p bronchoscopy 6/26 with mucous plugging removed. Chest tube to be removed 6/26. Lung re-expansion noted 6/26. Renal function stabilizing 6/28.     Left mainstem bronchial obstruction due to mucous plugging s/p bronchoscopy 6/26/2024  Acute hypoxic respiratory failure due to above, improved  Large left pleural effusion, transudative  S/p left thoracentesis 800mL 6/21, 1200mL 6/22  S/p left chest tube 6/23/2024  Right moderate pleural effusion  Patient with increasing shortness of breath for 1-2 weeks PTA.  CXR 6/21 in ED reveals complete opacification of the left hemithorax. CT chest-large left pleural effusion with complete collapse of the left upper and left lower and occlusion of left mainstem bronchus and a moderate size right pleural effusion. Patient underwent thoracentesis 800mL on 6/21, fluid appears transudative. Patient appears markedly volume overloaded. In the ED she initially required 2L O2 but was transitioned to HFNC overnight 6/22 to 6/23 during RRT.  Reports symptomatic improvement with thoracentesis 6/21 and 6/22, but continues to require 10+L O2 or HFNC on 6/23. Discussed with Pulm on 6/23, concern remains left mainstem bronchus obstruction but need to rule out recurrent pleural effusion as cause, so chest tube placed on 6/23. CT chest on 6/24 shows persistent persistent left bronchial obstruction, per Pulm likely mucous plugging.  Bronchoscopy done 6/26 with aspiration of mucous plugs. Chest tube removed 6/26. Patient with chest pain due to lung re-expansion 6/26, CXR 6/27 shows improving lung expansion.   It appears that patient's major turnaround started following bronchoscopy on 6/26.    Breathing continues to improve on 6/30. Weaning oxygen.  resp cx with normal nadine.  No fungal elements seen on KOH.  - Pleural fluid cytology pending.  - Pulmonology consult  Currently denies dyspnea, hypoxia, cough, sputum.,  On room air this afternoon.      Anuric renal failure on CKD, requiring brief dialysis, improved  Recent acute renal failure in May 2024 needing hemodialysis  Severe hyperkalemia, improved  Hyponatremia  Metabolic acidosis  Recent baseline creatinine has been fluctuating between 1.9-2.8. On admission Creatinine is 3.07, potassium is 7.5, sodium of 128 with bicarb of 17.   In the ER patient did receive albuterol, 2 g calcium gluconate, insulin, Kayexalate 10 g and 40 IV Lasix and 1 L IV fluid bolus. EKG showed sinus bradycardia with prolonged QT. Potassium has been elevated to 7.1 since  admission, improved to 5.3 on 6/23 with Lokelma.  Patient has been anuric since admission, no urine output. Etiology of anuric renal failure likely related to left mainstem bronchus obstruction. Due to ongoing anuria, TDC placed 6/23 and dialyzed 6/23 and 6/24.  Started to making urine 6/25, creatinine has stabilized on 6/28 and now fluctuating 2.7-3  - Nephrology consult  - Dialysis PRN  - Bumex per nephro.  Making urine.  7/1/2024, sodium 125, potassium 4.9, CR 2.62.    Possible acute diastolic congestive heart failure exacerbation  Elevated troponin suspect due to volume overload  Patient presents with large left pleural effusion, BNP  elevated to 11.9k. Note history of stress cardiomyopathy in Oct 2023 with EF 30-35% which then resolved on echo Nov 2023.    Patient does not have any significant chest discomfort and troponin are 174 and 177 and trending down to 156. Echo 6/22 shows Ef 60-65%, mid anterior and lateral severe hypokinessis, distal inferior hypokinesis, normal RV function. Suspect large component of renal failure contributing. Felt less likely to be CHF as this was likely due to renal failure.   - Cardiology consult   - HD per Nephrology, see above  - Tele, weights, I&O  - Diuresis per nephro/cards.    Systolic murmur: Noted systolic murmur 6/23, limited echo 6/24 shows no change from 6/22, likely flow murmur. Murmur improved 6/28.    Anemia of chronic disease  Acute anemia 6/23 likely spurious  Recent baseline hemoglobin has been fluctuating and has been between 8.4-10.1.   Hemoglobin 8.4 on 6/22-->6.4 on 6/23. Patient denies blood loss. Bilirubin is not elevated makes hemolysis less likely. Patient was given 1u pRBC, recheck Hgb was 9.5 via skin draw. The Hgb of 6.4 likely was spurious related to an inaccurate PICC line draw, likely Hgb was 8.5-->9.5 with 1u pRBC.  - [facility] GI consult appreciated, signed off  - Daily Hgb checks    Hypertension  Hyperlipidemia  History of coronary disease with an MI  in   Left heart cath on 10/4/2023 showed-completely occluded D1 with left to left collaterals from distal LAD to D1, moderate CAD involving proximal to mid RCA not significant by IFR and moderate coronary disease please involving distal small caliber RPL and distal circumflex artery. Last echo  showed EF of 55 to 60% with normal RV and moderate trileaflet aortic sclerosis  - Resume PTA Coreg   - Continue amlodipine 10mg daily    History of peripheral vascular disease status above-knee amputation on the right with wound dressing status washout on   Neuropathic pain  Patient has followed with vascular surgery and has noticed during last hospitalization in May 2024 she underwent washout with wound VAC placement and was discharged on Plavix and Crestor. On exam the amputation stump is currently wrapped, no obvious discharge from what I can see on exam   - Continue with PTA Plavix 75 mg daily  - wound vac now removed, vascular surgery consult appreciated  - Pain control with hydromorphone oral PRN (avoid oxycodone as it is renally cleared)    COPD not in exacerbation  - On exam has no wheezing and continue with as needed symbicort    GERD: Famotidine BID    History of B-cell lymphoma  - Follows with [place] [facility]  - Pending cytology from thoracentesis     Tobacco use  - Continue with nicotine patch    Ongoing stressors  Patient had a recent AKA in 2024 with complicated post op course 2024, and reports   2024.    Clinically Significant Risk Factors         # Hyponatremia: Lowest Na = 125 mmol/L in last 2 days, will monitor as appropriate      # Hypoalbuminemia: Lowest albumin = 2.1 g/dL at 2024  6:21 AM, will monitor as appropriate   # Thrombocytopenia: Lowest platelets = 115 in last 2 days, will monitor for bleeding   # Hypertension: Noted on problem list             #Precipitous drop in Hgb/Hct: Lowest Hgb this hospitalization: 6.4 g/dL. Will continue to monitor  and treat/transfuse as appropriate.      # Moderate Malnutrition: based on nutrition assessment      # Financial/Environmental Concerns: none          PT/OT: ordered  Diet: Fluid restriction 1800 ML FLUID  Combination Diet Regular Diet; Renal Diet (dialysis); 2 gm NA Diet; (1800 ml fr)  Snacks/Supplements Adult: Special K Bar; Between Meals  Snacks/Supplements Adult: Expedite Bottle; Between Meals    DVT Prophylaxis: none  Alvarez Catheter: Not present  Lines: PRESENT      PICC 06/22/24 Double Lumen Left Brachial vein medial access-Site Assessment: WDL  CVC Right Subclavian-Site Assessment: WDL  CVC Double Lumen Right Subclavian Tunneled-Site Assessment: WDL    Cardiac Monitoring: None  Code Status: Full Code    Medically Ready for Discharge: >2days pending LIMA, currently as needed dialysis    Bethanie Staley MD  Hospitalist Service  [facility]    Total time 50 minutes for today 7/1/2024 :   time consisted of the following, assuming Patient care with review of records including labs, imaging results, medications, interdisciplinary notes; examination of Patient;  and completing documentation and orders.  Care Management included counseling/discussion with Patient regarding current condition including bronchial obstruction, LIMA, and Coordination of Care time with Nursing  and Specialists, Nephrology regarding care plan, management and surveillance.       Data reviewed today: I reviewed all new labs and imaging results over the last 24 hours.    Physical Exam   Temp: 97.6  F (36.4  C) Temp src: Oral BP: (!) 154/66 Pulse: 58   Resp: 16 SpO2: 96 % O2 Device: None (Room air) Oxygen Delivery: 2 LPM  Vitals:    06/28/24 0600 06/30/24 0605 07/01/24 0554   Weight: 58 kg (127 lb 13.9 oz) 50.6 kg (111 lb 8.8 oz) 58.9 kg (129 lb 13.6 oz)     General/Constitutional:   NAD, alert, calm, cooperative    Chest/Respiratory: Respirations nonlabored room air  Cardiovascular:  regular, no murmur appreciated.   LE edema none status post right AKA  Gastrointestinal/Abdomen:  soft, nontender, no rebound, guarding or other peritoneal signs.  Neuro.  Gross motor tested, nonfocal, R AKA  Psych oriented, affect narrow range    Medications   Current Facility-Administered Medications   Medication Dose Route Frequency Provider Last Rate Last Admin    No lozenges or gum should be given while patient on BIPAP/AVAPS/AVAPS AE   Does not apply Continuous PRN Devin Ravi MD        Patient may continue current oral medications   Does not apply Continuous PRN Devin Ravi MD         Current Facility-Administered Medications   Medication Dose Route Frequency Provider Last Rate Last Admin    - MEDICATION INSTRUCTIONS for Dialysis Patients -   Does not apply See Admin Instructions Love Gross, [facility]        albumin human 25 % injection 12.5 g  12.5 g Intravenous Once Hardy Falcon MD        amLODIPine (NORVASC) tablet 10 mg  10 mg Oral Daily Devin Ravi MD   10 mg at 07/01/24 0836    bumetanide (BUMEX) injection 2 mg  2 mg Intravenous Q8H Hardy Falcon MD   2 mg at 07/01/24 0835    calcium acetate (PHOSLO) capsule 667 mg  667 mg Oral TID w/meals Hardy Falcon MD   667 mg at 07/01/24 1441    carvedilol (COREG) tablet 12.5 mg  12.5 mg Oral BID w/meals Devin Ravi MD   12.5 mg at 07/01/24 0836    clopidogrel (PLAVIX) tablet 75 mg  75 mg Oral Daily Devin Ravi MD   75 mg at 07/01/24 0836    famotidine (PEPCID) tablet 10 mg  10 mg Oral Daily Devin Ravi MD   10 mg at 07/01/24 0836    fluticasone-vilanterol (BREO ELLIPTA) 200-25 MCG/ACT inhaler 1 puff  1 puff Inhalation Daily Bruce Ulloa MD   1 puff at 07/01/24 0905    ipratropium - albuterol 0.5 mg/2.5 mg/3 mL (DUONEB) neb solution 3 mL  3 mL Nebulization 4x daily Rod Nath MD   3 mL at 07/01/24 0844    nicotine (NICODERM CQ) 14 MG/24HR 24 hr patch 1 patch  1 patch Transdermal Daily Bruce Ulloa MD   1 patch at 07/01/24  0837    polyethylene glycol (MIRALAX) Packet 17 g  17 g Oral Daily Bruce Ulloa MD   17 g at 06/25/24 0906    [Held by provider] rosuvastatin (CRESTOR) tablet 10 mg  10 mg Oral At Bedtime Bruce Ulloa MD   10 mg at 06/22/24 2159    saccharomyces boulardii (FLORASTOR) capsule 250 mg  250 mg Oral BID Bruce Ulloa MD   250 mg at 07/01/24 0836    sodium chloride (PF) 0.9% PF flush 3 mL  3 mL Intracatheter Q8H Bruce Ulloa MD   3 mL at 06/30/24 1659       Data   Recent Labs   Lab 07/01/24  0609 06/30/24  0629 06/29/24  0621   WBC 3.7* 4.0 4.8   HGB 7.9* 8.3* 8.4*   MCV 91 93 94   * 132* 145*   * 126* 126*   POTASSIUM 4.9 4.6 4.6   CHLORIDE 91* 91* 91*   CO2 27 26 26   BUN 59.4* 52.4* 49.8*   CR 2.62* 2.68* 2.97*   ANIONGAP 7 9 9   SONIA 7.4* 7.6* 7.4*   GLC 72 76 76   ALBUMIN 2.3* 2.2* 2.1*       Imaging:   Recent Results (from the past 24 hour(s))   XR Chest Port 1 View    Narrative    EXAM: XR CHEST PORT 1 VIEW  LOCATION: [facility]  DATE: 7/1/2024    INDICATION: pleural effusion  COMPARISON: 6/30/2024 and multiple prior studies, CT chest 6/24/2024      Impression    IMPRESSION: No interval change. Right IJ dialysis catheter and left upper extremity PICC line are in good position overlying the distal SVC. Moderate left pleural effusion with associated atelectasis small right effusion are unchanged. Heart is of normal   size. No signs of pneumonia or failure.

## 2024-07-01 NOTE — PROGRESS NOTES
"[facility] Nurse Inpatient Assessment     Consulted for: Pressure Injury coccyx     Summary:   Coccyx: 6/27 very small re-opened area, over the site of a recent prior full-thickness pressure injury which had healed.   WO initially saw pt 6/24 and signed off as coccyx was then intact.    Right AKA: Incidental finding 6/24, woc was following right AKA wound on previous admission.  Stopped wound vac 6/24 and switched to topical dressings due to healed to skin level at portions of the wound base. (This wound not assessed 6/27)    Patient History (according to provider note(s):      \"63 year old female with PMH CAD, MI, peripheral vascular disease status angioplasty and right AKA with wound vac, DLE, COPD, CMT disease, tobacco use, CKD stage III, GERD, hypertension, history of B-cell lymphoma, hyperlipidemia, depression, anxiety, who was admitted on 6/21/2024 with acute hypoxic respiratory failure due to a large left pleural effusion, hyperkalemia, and hyponatremia.                Patient recently admitted to [facility] 3/29-4/22/24 with right limb ischemia in setting of severe PAD ultimately requiring right AKA with complicated post op course (including LIMA requiring HD) after which she was discharged to  ARU. She was readmitted to [facility] 5/15-5/29/24 with stump eschars requiring surgical debridement and wound VAC placement.                This admission, patient underwent left thoracentesis with 800mL and 1200mL fluid removed. Severe hyperkalemia improved with Lokelma. Patient has been anuric since admission, started on dialysis on 6/23. Chest tube placed 6/23, CT on 6/24 shows limited lung expansion concerning for left mainstem obstruction.\"    Assessment:      Areas visualized during today's visit: Sacrum/coccyx    Pressure injury location: Coccyx     Last photo: 6-27-24      Wound due to: Pressure Injury per historical charting, appears to have reached Stage 3 " previously  Wound history/plan of care: chart review reveals pt had prior small full-thickness pressure injury, developed during recent prolonged hospitalizations with right AKA.  Wound had healed but has now slightly reopened as of [facility] visit 6/27.  Pt able to roll to her side with light assist x 1.  Mepilex Sacral has been in use.   Wound base: pink moist tissue     Palpation of the wound bed: normal     Drainage: small     Description of drainage: serosanguinous and tan     Measurements (length x width x depth, in cm): 0.6 x 0.4 x 0.1 cm (positional)     Tunneling: N/A     Undermining: N/A  Periwound skin: Intact      Color: deep pink, blanchable      Temperature: normal   Odor: none  Pain: tender, pain when lying on area  Pain interventions prior to dressing change: patient tolerated well  Treatment goal: Heal  and Protection  STATUS: stable  Supplies ordered: supplies stored on unit, discussed with RN, and discussed with patient     Wound location: right AKA       Distal residual limb  1 x 1 x < 0.1 cm  Dry serous drainage, pink hypergranular tissue      Wound due to: Surgical Wound  Wound history/plan of care: 6/24  Wound base: pink-red granulation tissue, yellow-tan slough ~10%     Palpation of the wound bed: normal      Drainage: small     Description of drainage: serosanguinous and tan     Measurements (length x width x depth, in cm): largest 14  x 5  x  0.4 (proximal margin) cm,      Tunneling: N/A     Undermining: N/A  Periwound skin: Erythema- blanchable      Color: pink      Temperature: normal   Odor: none  Pain: mild and during dressing change, sore  Pain interventions prior to dressing change: soaking, slow and gentle cares , and distraction  Treatment goal: Heal , Drainage control, Infection control/prevention, Remove necrotic tissue, and Soften necrotic tissue  STATUS: stable  Supplies ordered: at bedside, supplies stored on unit, discussed with RN, and discussed with patient        Treatment Plan:  "    Coccyx wound care: Daily and prn for loose/soiled dressing:  Cleanse with wound cleanser. Pat dry.  Swab periwound area with no-sting skin barrier film, let dry.  Cover with Mepilex Sacral.   PIP measures.  Use ceiling lift to move patient in bed whenever possible, to minimize friction and shear to coccyx/sacrum.       Wound care  Start:  06/24/24 1404,   EVERY OTHER DAY,   Routine      Comments: Location: right stump  Care: provided every other day by primary RN  1. Cleanse every other day with wound cleanser and 4x4\" gauze, pat dry  2. Apply Criticiad paste to wound edges (no need to scrub off all white paste, reapply more)  3. Cover wound base with Aquacel Ag Advantage    4. Add ABD as needed for drainage  5. Secure with stockinette or ace wrap or EdemaWear          Skin care precautions  Start:  06/24/24 1404,   EFFECTIVE NOW,   Routine        Comments: Pressure Injury Prevention (PIP) Plan:  If patient is declining pressure injury prevention interventions: Explore reason why and address patient's concerns, Educate on pressure injury risk and prevention intervention(s), If patient is still declining, document \"informed refusal\" , and Ensure Care team is aware ( provider, charge nurse, etc)  Mattress: Follow bed algorithm, reassess daily and order specialty mattress, if indicated.  HOB: Maintain at or below 30 degrees, unless contraindicated  Repositioning in bed: Every 1-2 hours , Left/right positioning; avoid supine, and Raise foot of bed prior to raising head of bed, to reduce patient sliding down (shear)  Heels: Keep elevated off mattress and Pillows under calves  Protective Dressing: Sacral Mepilex for prevention (#061907),  especially for the agitated patient  Positioning Equipment:None  Chair positioning: Chair cushion (#046676) , Assist patient to reposition hourly, and Do NOT use a donut for sitting (this increases pressure to smaller area and creates a higher potential for injury)    If patient has " "a buttock pressure injury, or high risk for PI use chair cushion or SPS.  Moisture Management: Perineal cleansing /protection: Follow Incontinence Protocol, Avoid brief in bed, Clean and dry skin folds with bathing , Consider InterDry (#686400) between folds, and Moisturize dry skin  Under Devices: Inspect skin under all medical devices during skin inspection , Ensure tubes are stabilized without tension, and Ensure patient is not lying on medical devices or equipment when repositioned  Ask provider to discontinue device when no longer needed.          Orders: Reviewed and Updated    RECOMMEND PRIMARY TEAM ORDER: None, at this time  Education provided: importance of repositioning, plan of care, Infection prevention , and Off-loading pressure  Discussed plan of care with: Patient and Nurse  WO nurse follow-up plan: Weekly  Notify WOC if wound(s) deteriorate.  Nursing to notify the Provider(s) and re-consult the WOC Nurse if new skin concern.    DATA:     Current support surface: Standard  Standard Isoflex gel  Containment of urine/stool: Incontinence Protocol, Incontinent pad in bed, and Suction based external urinary catheter   BMI: Body mass index is 24.54 kg/m .   Active diet order: Orders Placed This Encounter      Combination Diet Regular Diet; Renal Diet (dialysis); 2 gm NA Diet; (1800 ml fr)     Output: I/O last 3 completed shifts:  In: 770 [P.O.:770]  Out: 1200 [Urine:1200]     Labs:   Recent Labs   Lab 07/01/24  0609   ALBUMIN 2.3*   HGB 7.9*   WBC 3.7*     Pressure injury risk assessment:   Sensory Perception: 4-->no impairment  Moisture: 3-->occasionally moist  Activity: 2-->chairfast  Mobility: 3-->slightly limited  Nutrition: 3-->adequate  Friction and Shear: 2-->potential problem  Cesar Score: 17    Sheila Javier RN, CWOCN  Please contact via FUELUP at name or group \"[facility] nurse\"- M-F 8A-4P  Leave  @ *[phone] for non-urgent needs. Checked occasionally M-F.   "

## 2024-07-01 NOTE — PROGRESS NOTES
[facility]     Renal Progress Note       SHORTHAND KEY FOR MY NOTES:  c = with, s = without, p = after, a = before, x = except, asx = asymptomatic, tx = transplant or treatment, sx = symptoms or symptomatic, cx = canceled or culture, rxn = reaction, yday = yesterday, nl = normal, abx = antibiotics, fxn = function, dx = diagnosis, dz = disease, m/h = melena/hematochezia, c/d/l/ha = cramping/dizziness/lightheadedness/headache, d/c = discharge or diarrhea/constipation, f/c/n/v = fevers/chills/nausea/vomiting, cp/sob = chest pain/shortness of breath, tbv = total body volume, rxn = reaction, tdc = tunneled dialysis catheter, pta = prior to admission, hd = hemodialysis, pd = peritoneal dialysis, hhd = home hemodialysis, edw = estimated dry wt         Assessment/Plan:     1.  LIMA/CKD IV.  Pt's cr is stable ~2.6 for several days.  The BUN is basically the same, too.  K and CO2 are fine.  She does not need dialysis today.  She remains TBV up, in part from hypoalbuminemic third-spacing.  No uremic sx - eating better today.  A.  Continue bumet 2 mg iv q 8h. Will give 1 dose of 25% alb before the next bumet dose and see if that helps mobilize some fluids.  B.  Strict I/Os.  C.  Follow labs, uo, sx daily.  D.  Avoid nephrotoxics.  E.  Will keep the TDC in place right now.    2.  Hyponatremia.  Despite increased uo and presumably higher free water loss from the loop diuretic, the Na has remained stable for the past few days.  She is still TBV up and has pleural effusions that haven't been drained since last wk.    A.  Follow Na daily.  B.  Will d/w team re whether or not a L thora will be helpful.    3.  Anemia. Hb has dropped to 7.9 today.  No obv signs of bleeding.   A.  Follow hb, clinically.    4.  FEN.  K is fine.  Phos is elevated due to the renal dysfxn.  We will start binders now and see if she needs them later.    A.  Follow electrolytes daily.  B.  Will start Ca acetate 667 mg TID AC.    Case  d/w Deloris Watt RN, pt/sister.        Interval History:     Pt feels a little better today.  Her appetite remains low, though she is trying to eat more.  No cp/sob/orthopnea.  Denies any f/c/n/v but having some itching.  Urinating more p increasing IV bumet to tid.              Medications and Allergies:     Current Facility-Administered Medications   Medication Dose Route Frequency Provider Last Rate Last Admin    - MEDICATION INSTRUCTIONS for Dialysis Patients -   Does not apply See Admin Instructions Love Gross, [facility]        amLODIPine (NORVASC) tablet 10 mg  10 mg Oral Daily Devin Ravi MD   10 mg at 07/01/24 0836    bumetanide (BUMEX) injection 2 mg  2 mg Intravenous Q8H Hardy Falcon MD   2 mg at 07/01/24 0835    carvedilol (COREG) tablet 12.5 mg  12.5 mg Oral BID w/meals Devin Ravi MD   12.5 mg at 07/01/24 0836    clopidogrel (PLAVIX) tablet 75 mg  75 mg Oral Daily Devin Ravi MD   75 mg at 07/01/24 0836    famotidine (PEPCID) tablet 10 mg  10 mg Oral Daily Devin Ravi MD   10 mg at 07/01/24 0836    fluticasone-vilanterol (BREO ELLIPTA) 200-25 MCG/ACT inhaler 1 puff  1 puff Inhalation Daily Bruce Ulloa MD   1 puff at 07/01/24 0905    ipratropium - albuterol 0.5 mg/2.5 mg/3 mL (DUONEB) neb solution 3 mL  3 mL Nebulization 4x daily Rod Nath MD   3 mL at 07/01/24 0844    nicotine (NICODERM CQ) 14 MG/24HR 24 hr patch 1 patch  1 patch Transdermal Daily Bruce Ulloa MD   1 patch at 07/01/24 0837    polyethylene glycol (MIRALAX) Packet 17 g  17 g Oral Daily Bruce Ulloa MD   17 g at 06/25/24 0906    [Held by provider] rosuvastatin (CRESTOR) tablet 10 mg  10 mg Oral At Bedtime Bruce Ulloa MD   10 mg at 06/22/24 2159    saccharomyces boulardii (FLORASTOR) capsule 250 mg  250 mg Oral BID Bruce Ulloa MD   250 mg at 07/01/24 0836    sodium chloride (PF) 0.9% PF flush 3 mL  3 mL Intracatheter Q8H Bruce Ulloa MD   3 mL at 06/30/24 1100      Allergies   Allergen Reactions    Contrast Dye Anaphylaxis     Pt reported facial and throat swelling with prior CT contrast    Pantoprazole      Protonix caused diffuse edema    Chantix [Varenicline]      Terrible dreams    Penicillins Itching and Rash          Physical Exam:     Vitals were reviewed     , Blood pressure (!) 154/66, pulse 58, temperature 97.6  F (36.4  C), temperature source Oral, resp. rate 16, weight 58.9 kg (129 lb 13.6 oz), SpO2 96%, not currently breastfeeding.  Wt Readings from Last 3 Encounters:   07/01/24 58.9 kg (129 lb 13.6 oz)   06/05/24 60.3 kg (132 lb 15 oz)   05/29/24 62.3 kg (137 lb 5.6 oz)     Intake/Output Summary (Last 24 hours) at 7/1/2024 1227  Last data filed at 7/1/2024 1200  Gross per 24 hour   Intake 770 ml   Output 1200 ml   Net -430 ml     GENERAL APPEARANCE: pleasant, looks ok, alert  RESP:  clear anteriorly, decreased laterally B  CV: RRR, nl S1/S2   ABDOMEN: s/nt/nd  EXTREMITIES/SKIN: 2+ LLE edema, R AKA  ACCESS:  + [facility] TDC         Data:     CBC RESULTS:     Recent Labs   Lab 07/01/24  0609 06/30/24  0629 06/29/24  0621 06/28/24  0552 06/27/24  0546 06/26/24  1637 06/26/24  0621   WBC 3.7* 4.0 4.8 4.8 5.3  --  6.0   RBC 2.67* 2.67* 2.71* 2.93* 3.02*  --  2.93*   HGB 7.9* 8.3* 8.4* 9.0* 9.4* 9.8* 9.2*   HCT 24.3* 24.7* 25.5* 27.3* 28.6*  --  27.5*   * 132* 145* 168 196  --  187     Basic Metabolic Panel:  Recent Labs   Lab 07/01/24  0609 06/30/24  0629 06/29/24  0621 06/28/24  0552 06/27/24  1709 06/27/24  0546 06/26/24  0621   * 126* 126* 129*  --  130* 133*   POTASSIUM 4.9 4.6 4.6 4.4  --  4.0 3.0*   CHLORIDE 91* 91* 91* 94*  --  94* 97*   CO2 27 26 26 26  --  27 28   BUN 59.4* 52.4* 49.8* 42.1*  --  37.9* 29.8*   CR 2.62* 2.68* 2.97* 2.77*  --  2.76* 2.26*   GLC 72 76 76 78 139* 76 86   SONIA 7.4* 7.6* 7.4* 7.6*  --  7.6* 7.7*     INRNo lab results found in last 7 days.   Attestation:   I have reviewed today's relevant vital signs, notes, medications,  labs and imaging.    Hardy Falcon MD  [facility] - Nephrology  [phone]

## 2024-07-01 NOTE — PLAN OF CARE
Goal Outcome Evaluation:      Plan of Care Reviewed With: patient    Overall Patient Progress: improvingOverall Patient Progress: improving    Shift: 7p-7a  Surgery/POD#: Pt here for hyperkalemia, pleural effusion, acute hypoxic respiratory failure & LIMA.   POD 4 from a bronchoscopy. CT removed 6/26.   Behavior & Aggression: green  Fall Risk: Yes  Orientation: A&O x4  ABNL VS/O2: VSS, ex HTN & 2L NC (unable to wean overnight  Tele: NA  ABNL Labs: Na 126, Cr 2.68, Hgb 8.3  Pain Management: tylenol x2, PO dilaudid x3  Bowel/Bladder: purewick, intermittent incontinence, bowel sounds normoactive, passing flatus, x1 BM overnight  Drains: NA  Lines: PICC SL'd  Skin: butt wound and R stump ulcer w/ dressing change orders, left flank CT site  Diet: renal/2g NA/1800 ml fluid restriction; denies N/V  Activity Level: T/R - intermittently refuses, but shifts weight independently; pt has been refusing to get in the chair during the day  Tests/Procedures: NA  Anticipated  DC Date: pending  Significant Information:   Vascular, nephrology, hospitalist, PT/OT following.

## 2024-07-02 LAB
ALBUMIN SERPL BCG-MCNC: 2.6 G/DL (ref 3.5–5.2)
ANION GAP SERPL CALCULATED.3IONS-SCNC: 8 MMOL/L (ref 7–15)
BUN SERPL-MCNC: 60.4 MG/DL (ref 8–23)
CALCIUM SERPL-MCNC: 7.8 MG/DL (ref 8.8–10.2)
CHLORIDE SERPL-SCNC: 91 MMOL/L (ref 98–107)
CREAT SERPL-MCNC: 2.48 MG/DL (ref 0.51–0.95)
DEPRECATED HCO3 PLAS-SCNC: 27 MMOL/L (ref 22–29)
EGFRCR SERPLBLD CKD-EPI 2021: 21 ML/MIN/1.73M2
ERYTHROCYTE [DISTWIDTH] IN BLOOD BY AUTOMATED COUNT: 13.8 % (ref 10–15)
GLUCOSE SERPL-MCNC: 76 MG/DL (ref 70–99)
HCT VFR BLD AUTO: 27.4 % (ref 35–47)
HGB BLD-MCNC: 9.1 G/DL (ref 11.7–15.7)
MCH RBC QN AUTO: 30.6 PG (ref 26.5–33)
MCHC RBC AUTO-ENTMCNC: 33.2 G/DL (ref 31.5–36.5)
MCV RBC AUTO: 92 FL (ref 78–100)
PHOSPHATE SERPL-MCNC: 6 MG/DL (ref 2.5–4.5)
PLATELET # BLD AUTO: 117 10E3/UL (ref 150–450)
POTASSIUM SERPL-SCNC: 5.1 MMOL/L (ref 3.4–5.3)
RBC # BLD AUTO: 2.97 10E6/UL (ref 3.8–5.2)
SODIUM SERPL-SCNC: 126 MMOL/L (ref 135–145)
WBC # BLD AUTO: 4.2 10E3/UL (ref 4–11)

## 2024-07-02 PROCEDURE — 120N000001 HC R&B MED SURG/OB

## 2024-07-02 PROCEDURE — 99232 SBSQ HOSP IP/OBS MODERATE 35: CPT | Performed by: INTERNAL MEDICINE

## 2024-07-02 PROCEDURE — 85027 COMPLETE CBC AUTOMATED: CPT | Performed by: INTERNAL MEDICINE

## 2024-07-02 PROCEDURE — 250N000011 HC RX IP 250 OP 636: Performed by: INTERNAL MEDICINE

## 2024-07-02 PROCEDURE — 250N000013 HC RX MED GY IP 250 OP 250 PS 637: Performed by: HOSPITALIST

## 2024-07-02 PROCEDURE — 999N000157 HC STATISTIC RCP TIME EA 10 MIN

## 2024-07-02 PROCEDURE — 250N000013 HC RX MED GY IP 250 OP 250 PS 637: Performed by: INTERNAL MEDICINE

## 2024-07-02 PROCEDURE — 99232 SBSQ HOSP IP/OBS MODERATE 35: CPT | Performed by: HOSPITALIST

## 2024-07-02 PROCEDURE — 94640 AIRWAY INHALATION TREATMENT: CPT | Mod: 76

## 2024-07-02 PROCEDURE — 94640 AIRWAY INHALATION TREATMENT: CPT

## 2024-07-02 PROCEDURE — 250N000009 HC RX 250: Performed by: INTERNAL MEDICINE

## 2024-07-02 PROCEDURE — P9047 ALBUMIN (HUMAN), 25%, 50ML: HCPCS | Performed by: INTERNAL MEDICINE

## 2024-07-02 PROCEDURE — 80069 RENAL FUNCTION PANEL: CPT | Performed by: INTERNAL MEDICINE

## 2024-07-02 RX ORDER — ALBUMIN (HUMAN) 12.5 G/50ML
12.5 SOLUTION INTRAVENOUS ONCE
Status: COMPLETED | OUTPATIENT
Start: 2024-07-02 | End: 2024-07-02

## 2024-07-02 RX ADMIN — CARVEDILOL 12.5 MG: 12.5 TABLET, FILM COATED ORAL at 10:38

## 2024-07-02 RX ADMIN — CARVEDILOL 12.5 MG: 12.5 TABLET, FILM COATED ORAL at 18:22

## 2024-07-02 RX ADMIN — AMLODIPINE BESYLATE 10 MG: 10 TABLET ORAL at 10:38

## 2024-07-02 RX ADMIN — ACETAMINOPHEN 650 MG: 325 TABLET, FILM COATED ORAL at 11:47

## 2024-07-02 RX ADMIN — BUMETANIDE 2 MG: 0.25 INJECTION, SOLUTION INTRAMUSCULAR; INTRAVENOUS at 18:23

## 2024-07-02 RX ADMIN — BUMETANIDE 2 MG: 0.25 INJECTION, SOLUTION INTRAMUSCULAR; INTRAVENOUS at 10:42

## 2024-07-02 RX ADMIN — ACETAMINOPHEN 650 MG: 325 TABLET, FILM COATED ORAL at 02:52

## 2024-07-02 RX ADMIN — ALBUMIN HUMAN 12.5 G: 0.25 SOLUTION INTRAVENOUS at 15:30

## 2024-07-02 RX ADMIN — HYDROMORPHONE HYDROCHLORIDE 2 MG: 2 TABLET ORAL at 02:52

## 2024-07-02 RX ADMIN — HYDROMORPHONE HYDROCHLORIDE 2 MG: 2 TABLET ORAL at 11:47

## 2024-07-02 RX ADMIN — NICOTINE 1 PATCH: 14 PATCH, EXTENDED RELEASE TRANSDERMAL at 10:42

## 2024-07-02 RX ADMIN — CALCIUM ACETATE 667 MG: 667 CAPSULE ORAL at 10:38

## 2024-07-02 RX ADMIN — BUMETANIDE 2 MG: 0.25 INJECTION, SOLUTION INTRAMUSCULAR; INTRAVENOUS at 02:55

## 2024-07-02 RX ADMIN — Medication 250 MG: at 20:44

## 2024-07-02 RX ADMIN — ACETAMINOPHEN 650 MG: 325 TABLET, FILM COATED ORAL at 20:44

## 2024-07-02 RX ADMIN — Medication 250 MG: at 10:38

## 2024-07-02 RX ADMIN — CALCIUM ACETATE 667 MG: 667 CAPSULE ORAL at 18:22

## 2024-07-02 RX ADMIN — FAMOTIDINE 10 MG: 10 TABLET, FILM COATED ORAL at 10:38

## 2024-07-02 RX ADMIN — IPRATROPIUM BROMIDE AND ALBUTEROL SULFATE 3 ML: .5; 3 SOLUTION RESPIRATORY (INHALATION) at 15:44

## 2024-07-02 RX ADMIN — HYDROMORPHONE HYDROCHLORIDE 2 MG: 2 TABLET ORAL at 20:44

## 2024-07-02 RX ADMIN — IPRATROPIUM BROMIDE AND ALBUTEROL SULFATE 3 ML: .5; 3 SOLUTION RESPIRATORY (INHALATION) at 11:22

## 2024-07-02 RX ADMIN — CLOPIDOGREL BISULFATE 75 MG: 75 TABLET ORAL at 10:38

## 2024-07-02 RX ADMIN — FLUTICASONE FUROATE AND VILANTEROL TRIFENATATE 1 PUFF: 200; 25 POWDER RESPIRATORY (INHALATION) at 10:40

## 2024-07-02 ASSESSMENT — ACTIVITIES OF DAILY LIVING (ADL)
ADLS_ACUITY_SCORE: 51

## 2024-07-02 NOTE — PROGRESS NOTES
[facility]    Hospitalist Progress Note    Interval History   Assumed care.  No complaints, eating, bowels moving.  Right AKA, nonambulatory.  Denies dyspnea, cough, chest pain.    Assessment & Plan   Summary: Shirley Hendricks is a 63 year old female with PMH CAD, MI, peripheral vascular disease status angioplasty and right AKA with wound vac, DLE, COPD, CMT disease, tobacco use, CKD stage III, GERD, hypertension, history of B-cell lymphoma, hyperlipidemia, depression, anxiety, who was admitted on 6/21/2024 with acute hypoxic respiratory failure due to a large left pleural effusion, hyperkalemia, and hyponatremia.   Patient recently admitted to [facility] 3/29-4/22/24 with right limb ischemia in setting of severe PAD ultimately requiring right AKA with complicated post op course (including LIMA requiring HD) after which she was discharged to  ARU. She was readmitted to [facility] 5/15-5/29/24 with stump eschars requiring surgical debridement and wound VAC placement.   This admission, patient underwent left thoracentesis with 800mL and 1200mL fluid removed. Severe hyperkalemia improved with Lokelma. Patient had been anuric since admission, started on dialysis on 6/23 now with some renal recovery. Chest tube placed 6/23, CT on 6/24 shows ongoing left bronchus obstruction, felt to be mucous plugging per Pulm. Improved after dialysis 6/24, now making urine, O2 needs down to 2-3L.   S/p bronchoscopy 6/26 with mucous plugging removed. Chest tube to be removed 6/26. Lung re-expansion noted 6/26. Renal function stabilizing 6/28.     Left mainstem bronchial obstruction due to mucous plugging s/p bronchoscopy 6/26/2024  Acute hypoxic respiratory failure due to above, improved  Large left pleural effusion, transudative  S/p left thoracentesis 800mL 6/21, 1200mL 6/22  S/p left chest tube 6/23/2024  Right moderate pleural effusion  Patient with increasing shortness of breath for 1-2 weeks PTA.  CXR 6/21 in ED reveals complete opacification of the left hemithorax. CT chest-large left pleural effusion with complete collapse of the left upper and left lower and occlusion of left mainstem bronchus and a moderate size right pleural effusion. Patient underwent thoracentesis 800mL on 6/21, fluid appears transudative. Patient appears markedly volume overloaded. In the ED she initially required 2L O2 but was transitioned to HFNC overnight 6/22 to 6/23 during RRT.  Reports symptomatic improvement with thoracentesis 6/21 and 6/22, but continues to require 10+L O2 or HFNC on 6/23. Discussed with Pulm on 6/23, concern remains left mainstem bronchus obstruction but need to rule out recurrent pleural effusion as cause, so chest tube placed on 6/23. CT chest on 6/24 shows persistent persistent left bronchial obstruction, per Pulm likely mucous plugging.  Bronchoscopy done 6/26 with aspiration of mucous plugs. Chest tube removed 6/26. Patient with chest pain due to lung re-expansion 6/26, CXR 6/27 shows improving lung expansion.   It appears that patient's major turnaround started following bronchoscopy on 6/26.    Breathing continues to improve on 6/30. Weaning oxygen.  resp cx with normal nadine.  No fungal elements seen on KOH.  - Pleural fluid cytology pending.  - Pulmonology consult  Currently denies dyspnea, hypoxia, cough, sputum.,  On room air this afternoon.      Anuric renal failure on CKD, requiring brief dialysis, improved  Recent acute renal failure in May 2024 needing hemodialysis  Severe hyperkalemia, improved  Hyponatremia  Metabolic acidosis  Recent baseline creatinine has been fluctuating between 1.9-2.8. On admission Creatinine is 3.07, potassium is 7.5, sodium of 128 with bicarb of 17.   In the ER patient did receive albuterol, 2 g calcium gluconate, insulin, Kayexalate 10 g and 40 IV Lasix and 1 L IV fluid bolus. EKG showed sinus bradycardia with prolonged QT. Potassium has been elevated to 7.1 since  admission, improved to 5.3 on 6/23 with Lokelma.  Patient has been anuric since admission, no urine output. Etiology of anuric renal failure likely related to left mainstem bronchus obstruction. Due to ongoing anuria, TDC placed 6/23 and dialyzed 6/23 and 6/24.  Started to making urine 6/25, creatinine has stabilized on 6/28 and now fluctuating 2.7-3  Had received HD.  - Nephrology consult.  Appreciate input, see note 7/2/2024 received IV albumin yesterday, CR stable to improved.  Hemoglobin increased likely suggesting the past was delusional.  No signs of active bleeding.  Another dose of albumin today, continue IV Bumex, follow clinically including BMP, may be able to pull HD catheter if continued improvement.      Possible acute diastolic congestive heart failure exacerbation  Elevated troponin suspect due to volume overload  Patient presents with large left pleural effusion, BNP  elevated to 11.9k. Note history of stress cardiomyopathy in Oct 2023 with EF 30-35% which then resolved on echo Nov 2023.    Patient does not have any significant chest discomfort and troponin are 174 and 177 and trending down to 156. Echo 6/22 shows Ef 60-65%, mid anterior and lateral severe hypokinessis, distal inferior hypokinesis, normal RV function. Suspect large component of renal failure contributing. Felt less likely to be CHF as this was likely due to renal failure.   - Cardiology consult   - Nephrology, see above  - Tele, weights, I&O  - Diuresis per nephro/cards.    Systolic murmur: Noted systolic murmur 6/23, limited echo 6/24 shows no change from 6/22, likely flow murmur. Murmur improved 6/28.    Anemia of chronic disease  Acute anemia 6/23 likely spurious  Recent baseline hemoglobin has been fluctuating and has been between 8.4-10.1.   Hemoglobin 8.4 on 6/22-->6.4 on 6/23. Patient denies blood loss. Bilirubin is not elevated makes hemolysis less likely. Patient was given 1u pRBC, recheck Hgb was 9.5 via skin draw. The Hgb  of 6.4 likely was spurious related to an inaccurate PICC line draw, likely Hgb was 8.5-->9.5 with 1u pRBC.  - [facility] consult appreciated, signed off  - Daily Hgb checks, see above    Hypertension  Hyperlipidemia  History of coronary disease with an MI in 2019  Left heart cath on 10/4/2023 showed-completely occluded D1 with left to left collaterals from distal LAD to D1, moderate CAD involving proximal to mid RCA not significant by IFR and moderate coronary disease please involving distal small caliber RPL and distal circumflex artery. Last echo  showed EF of 55 to 60% with normal RV and moderate trileaflet aortic sclerosis  - Resume PTA Coreg   - Continue amlodipine 10mg daily    History of peripheral vascular disease status above-knee amputation on the right with wound dressing status washout on   Neuropathic pain  Patient has followed with vascular surgery and has noticed during last hospitalization in May 2024 she underwent washout with wound VAC placement and was discharged on Plavix and Crestor. On exam the amputation stump is currently wrapped, no obvious discharge from what I can see on exam   - Continue with PTA Plavix 75 mg daily  - wound vac now removed, vascular surgery consult appreciated  - Pain control with hydromorphone oral PRN (avoid oxycodone as it is renally cleared)    COPD not in exacerbation  - On exam has no wheezing and continue with as needed symbicort    GERD: Famotidine BID    History of B-cell lymphoma  - Follows with [place] [facility]  - Pending cytology from thoracentesis     Tobacco use  - Continue with nicotine patch    Ongoing stressors  Patient had a recent AKA in 2024 with complicated post op course 2024, and reports   2024.    Clinically Significant Risk Factors         # Hyponatremia: Lowest Na = 125 mmol/L in last 2 days, will monitor as appropriate      # Hypoalbuminemia: Lowest albumin = 2.1 g/dL at 2024  6:21 AM, will  monitor as appropriate   # Thrombocytopenia: Lowest platelets = 115 in last 2 days, will monitor for bleeding   # Hypertension: Noted on problem list             #Precipitous drop in Hgb/Hct: Lowest Hgb this hospitalization: 6.4 g/dL. Will continue to monitor and treat/transfuse as appropriate.      # Moderate Malnutrition: based on nutrition assessment      # Financial/Environmental Concerns: none          PT/OT: ordered  Diet: Fluid restriction 1800 ML FLUID  Snacks/Supplements Adult: Special K Bar; Between Meals  Snacks/Supplements Adult: Expedite Bottle; Between Meals  Combination Diet Renal Diet (non-dialysis); 2 gm NA Diet    DVT Prophylaxis: none.  Encourage patient out of bed, up and sitting with meals.  Alvarez Catheter: Not present  Lines: PRESENT      PICC 06/22/24 Double Lumen Left Brachial vein medial access-Site Assessment: WDL  [REMOVED] CVC Right Subclavian-Site Assessment: WDL  CVC Double Lumen Right Subclavian Tunneled-Site Assessment: WDL    Cardiac Monitoring: None  Code Status: Full Code    Medically Ready for Discharge: >2days pending LIMA,     Bethanie Staley MD  Hospitalist Service  [facility]    I spent 35 minutes total time in management of care today 7/2/2024 reviewing labs, medications, interdisciplinary notes; and completing documentation of encounter and orders with Care Management including counseling/discussion withthe Patient regarding LIMA; encourge out of bed;  and Coordinating Care and plan with Nursing and Specialists, Nephrology regarding management and surveillance; as above.        Data reviewed today: I reviewed all new labs and imaging results over the last 24 hours.    Physical Exam   Temp: 97.7  F (36.5  C) Temp src: Oral BP: (!) 168/70 Pulse: 59   Resp: 16 SpO2: 94 % O2 Device: None (Room air) (weaned to RA.) Oxygen Delivery: 1 LPM  Vitals:    06/30/24 0605 07/01/24 0554 07/02/24 0600   Weight: 50.6 kg (111 lb 8.8 oz) 58.9 kg (129 lb 13.6 oz)  55.3 kg (121 lb 14.6 oz)     General/Constitutional:   NAD, alert, calm, cooperative  Chest/Respiratory: Respirations nonlabored room air  Cardiovascular:  regular, no murmur appreciated.  LE edema none status post right AKA  Gastrointestinal/Abdomen:  soft, nontender, no rebound, guarding or other peritoneal signs.  Neuro.  Gross motor tested, nonfocal, R AKA  Psych oriented, affect fairly flat.    Medications   Current Facility-Administered Medications   Medication Dose Route Frequency Provider Last Rate Last Admin    No lozenges or gum should be given while patient on BIPAP/AVAPS/AVAPS AE   Does not apply Continuous PRN Devin Ravi MD        Patient may continue current oral medications   Does not apply Continuous PRN Devin Ravi MD         Current Facility-Administered Medications   Medication Dose Route Frequency Provider Last Rate Last Admin    - MEDICATION INSTRUCTIONS for Dialysis Patients -   Does not apply See Admin Instructions Devin Ravi MD        albumin human 25 % injection 12.5 g  12.5 g Intravenous Once Hardy Falcon MD        amLODIPine (NORVASC) tablet 10 mg  10 mg Oral Daily Devin Ravi MD   10 mg at 07/02/24 1038    bumetanide (BUMEX) injection 2 mg  2 mg Intravenous Q8H Hardy Falcon MD   2 mg at 07/02/24 1042    calcium acetate (PHOSLO) capsule 667 mg  667 mg Oral TID w/meals Hardy Falcon MD   667 mg at 07/02/24 1038    carvedilol (COREG) tablet 12.5 mg  12.5 mg Oral BID w/meals Devin Ravi MD   12.5 mg at 07/02/24 1038    clopidogrel (PLAVIX) tablet 75 mg  75 mg Oral Daily Devin Ravi MD   75 mg at 07/02/24 1038    famotidine (PEPCID) tablet 10 mg  10 mg Oral Daily Devin Ravi MD   10 mg at 07/02/24 1038    fluticasone-vilanterol (BREO ELLIPTA) 200-25 MCG/ACT inhaler 1 puff  1 puff Inhalation Daily Bruce Ulloa MD   1 puff at 07/02/24 1040    ipratropium - albuterol 0.5 mg/2.5 mg/3 mL (DUONEB) neb solution 3 mL  3 mL  Nebulization 4x daily Rod Nath MD   3 mL at 07/02/24 1122    nicotine (NICODERM CQ) 14 MG/24HR 24 hr patch 1 patch  1 patch Transdermal Daily Bruce Ulloa MD   1 patch at 07/02/24 1042    polyethylene glycol (MIRALAX) Packet 17 g  17 g Oral Daily Bruce Ulloa MD   17 g at 06/25/24 0906    [Held by provider] rosuvastatin (CRESTOR) tablet 10 mg  10 mg Oral At Bedtime Bruce lUloa MD   10 mg at 06/22/24 2159    saccharomyces boulardii (FLORASTOR) capsule 250 mg  250 mg Oral BID Bruce Ulloa MD   250 mg at 07/02/24 1038    sodium chloride (PF) 0.9% PF flush 3 mL  3 mL Intracatheter Q8H Bruce Ulloa MD   3 mL at 07/02/24 0640       Data   Recent Labs   Lab 07/02/24  0603 07/01/24  0609 06/30/24  0629   WBC 4.2 3.7* 4.0   HGB 9.1* 7.9* 8.3*   MCV 92 91 93   * 115* 132*   * 125* 126*   POTASSIUM 5.1 4.9 4.6   CHLORIDE 91* 91* 91*   CO2 27 27 26   BUN 60.4* 59.4* 52.4*   CR 2.48* 2.62* 2.68*   ANIONGAP 8 7 9   SONIA 7.8* 7.4* 7.6*   GLC 76 72 76   ALBUMIN 2.6* 2.3* 2.2*       Imaging:   No results found for this or any previous visit (from the past 24 hour(s)).

## 2024-07-02 NOTE — PLAN OF CARE
Date & Time: 1900-0730.  Surgery/POD#: transferred from [facility] on 6/30. Here w/ hyperkalemia, pleural effusion, acute hypoxic respiratory failure & LIMA. POD 6 from a bronchoscopy to remove mucous plugs.  Behavior & Aggression: has a flat affect and can refuse cares at times.  Fall Risk: Yes.  Orientation:A&Ox4.  ABNL VS/O2:VSS on 2L of o2, exp HTN. Noted diminished lung sounds.  ABNL Labs: see chart. Hgb 7.9, recheck in am labs.  Nausea: intermittent- gave Zofran, which was effective.  Pain Management:PRN Tylenol & Dilaudid.  Bowel/Bladder: Incontinent of B/B, purewick in place - for strict I&O's, no BM, passing gas.  IV/Drains: L PICC SL. CVC in place.  Wounds/incisions: R AKA stump wound is C/D/I. Sacrum wound is C/D/I - mepliex in place. Old chest tube (L flank) site is C/D/I. Noted 2+-3+ LLE edema.  Diet:Renal diet w/ 1800ml fluid restriction.  Activity Level: Wheelchair bound - Ax2 w/ lift devices & cares, T&R Q 2hrs as tolerated.  Tests/Procedures: N/A.  Anticipated  DC Date: TBD.

## 2024-07-02 NOTE — CONSULTS
"SPIRITUAL HEALTH SERVICES  SPIRITUAL ASSESSMENT Consult Note  [facility]     REFERRAL SOURCE: Follow up per patient request    Brief visit with Shirley who declined a visit today as she was tired and wanting to rest. She did share that she is feeling better and looking forward to her son bringing her wheelchair so that she can \"get around\" more in her room.    Shirley is aware of spiritual health availability and how to request a visit, if desired.    PLAN: Spiritual Health remains available for support. Please consult as needs arise.    Rima Hall  Associate      [facility] available 24/7 for emergent requests/referrals, either by paging the on-call  or by entering an ASAP/STAT consult in Epic (this will also page the on-call ).     "

## 2024-07-02 NOTE — PROGRESS NOTES
[facility]     Renal Progress Note       SHORTHAND KEY FOR MY NOTES:  c = with, s = without, p = after, a = before, x = except, asx = asymptomatic, tx = transplant or treatment, sx = symptoms or symptomatic, cx = canceled or culture, rxn = reaction, yday = yesterday, nl = normal, abx = antibiotics, fxn = function, dx = diagnosis, dz = disease, m/h = melena/hematochezia, c/d/l/ha = cramping/dizziness/lightheadedness/headache, d/c = discharge or diarrhea/constipation, f/c/n/v = fevers/chills/nausea/vomiting, cp/sob = chest pain/shortness of breath, tbv = total body volume, rxn = reaction, tdc = tunneled dialysis catheter, pta = prior to admission, hd = hemodialysis, pd = peritoneal dialysis, hhd = home hemodialysis, edw = estimated dry wt         Assessment/Plan:     1.  LIMA/CKD IV.  Pt's cr is a little better today at ~2.5.  She does not have any uremic sx.  TBV up, but better c the use of alb to mobilize fluids.  No need for HD.  If she continues to improve, we will plan to pull the TDC this week.  A.  Give another dose of alb 25%.  B.  Continue IV diuretics today.  Depending on how she does, we will plan to change to oral tmrw.  C.  Based on how she does, we will make plans to remove the TDC.  D.  Follow labs, uo, sx daily.  E.  Avoid nephrotoxics.    2.  Hyponatremia.  Stable in the mid-120s.  She is asx.  Hopefully, it will improve as she diureses.  A.  Follow Na daily.    3.  Anemia. Hb is up to 9.1 today c better diuresis, suggesting that the drop was prob dilutional.  A.  Follow hb, clinically.    4.  FEN.  Phos is ~ the same p starting binders.  K is ok.  Ca corrects for the low alb.  A.  Follow electrolytes daily.  B.  Continue Ca acetate 667 mg TID AC.    Case d/w Hue Watt RN.        Interval History:     Pt feels better today.  She urinated more c the alb infusion yday.  Breathing better and has more of an appetite today.          Medications and Allergies:     Current  Facility-Administered Medications   Medication Dose Route Frequency Provider Last Rate Last Admin    - MEDICATION INSTRUCTIONS for Dialysis Patients -   Does not apply See Admin Instructions Devin Ravi MD        albumin human 25 % injection 12.5 g  12.5 g Intravenous Once Hardy Falcon MD        amLODIPine (NORVASC) tablet 10 mg  10 mg Oral Daily Devin Ravi MD   10 mg at 07/02/24 1038    bumetanide (BUMEX) injection 2 mg  2 mg Intravenous Q8H Hardy Falcon MD   2 mg at 07/02/24 1042    calcium acetate (PHOSLO) capsule 667 mg  667 mg Oral TID w/meals Hardy Falcon MD   667 mg at 07/02/24 1038    carvedilol (COREG) tablet 12.5 mg  12.5 mg Oral BID w/meals Devin Ravi MD   12.5 mg at 07/02/24 1038    clopidogrel (PLAVIX) tablet 75 mg  75 mg Oral Daily Devin Ravi MD   75 mg at 07/02/24 1038    famotidine (PEPCID) tablet 10 mg  10 mg Oral Daily Devin Ravi MD   10 mg at 07/02/24 1038    fluticasone-vilanterol (BREO ELLIPTA) 200-25 MCG/ACT inhaler 1 puff  1 puff Inhalation Daily Bruce Ulloa MD   1 puff at 07/02/24 1040    ipratropium - albuterol 0.5 mg/2.5 mg/3 mL (DUONEB) neb solution 3 mL  3 mL Nebulization 4x daily Rod Nath MD   3 mL at 07/02/24 1122    nicotine (NICODERM CQ) 14 MG/24HR 24 hr patch 1 patch  1 patch Transdermal Daily Bruce Ulloa MD   1 patch at 07/02/24 1042    polyethylene glycol (MIRALAX) Packet 17 g  17 g Oral Daily Bruce Ulloa MD   17 g at 06/25/24 0906    [Held by provider] rosuvastatin (CRESTOR) tablet 10 mg  10 mg Oral At Bedtime Bruce Ulloa MD   10 mg at 06/22/24 2159    saccharomyces boulardii (FLORASTOR) capsule 250 mg  250 mg Oral BID Bruce Ulloa MD   250 mg at 07/02/24 1038    sodium chloride (PF) 0.9% PF flush 3 mL  3 mL Intracatheter Q8H Bruce Ulloa MD   3 mL at 07/02/24 0640     Allergies   Allergen Reactions    Contrast Dye Anaphylaxis     Pt reported facial and throat swelling with prior CT contrast     Pantoprazole      Protonix caused diffuse edema    Chantix [Varenicline]      Terrible dreams    Penicillins Itching and Rash          Physical Exam:     Vitals were reviewed     , Blood pressure (!) 168/70, pulse 59, temperature 97.7  F (36.5  C), temperature source Oral, resp. rate 16, weight 55.3 kg (121 lb 14.6 oz), SpO2 94%, not currently breastfeeding.  Wt Readings from Last 3 Encounters:   07/02/24 55.3 kg (121 lb 14.6 oz)   06/05/24 60.3 kg (132 lb 15 oz)   05/29/24 62.3 kg (137 lb 5.6 oz)     Intake/Output Summary (Last 24 hours) at 7/2/2024 1309  Last data filed at 7/2/2024 1232  Gross per 24 hour   Intake 440 ml   Output 1800 ml   Net -1360 ml     GENERAL APPEARANCE: pleasant, looks better  RESP:  clear anteriorly, decreased laterally B  CV: RRR, nl S1/S2   ABDOMEN: s/nt/nd  EXTREMITIES/SKIN: 1+ LLE edema (improved), R AKA  ACCESS:  + [facility]         Data:     CBC RESULTS:     Recent Labs   Lab 07/02/24  0603 07/01/24  0609 06/30/24  0629 06/29/24  0621 06/28/24  0552 06/27/24  0546   WBC 4.2 3.7* 4.0 4.8 4.8 5.3   RBC 2.97* 2.67* 2.67* 2.71* 2.93* 3.02*   HGB 9.1* 7.9* 8.3* 8.4* 9.0* 9.4*   HCT 27.4* 24.3* 24.7* 25.5* 27.3* 28.6*   * 115* 132* 145* 168 196     Basic Metabolic Panel:  Recent Labs   Lab 07/02/24  0603 07/01/24  0609 06/30/24  0629 06/29/24  0621 06/28/24  0552 06/27/24  1709 06/27/24  0546   * 125* 126* 126* 129*  --  130*   POTASSIUM 5.1 4.9 4.6 4.6 4.4  --  4.0   CHLORIDE 91* 91* 91* 91* 94*  --  94*   CO2 27 27 26 26 26  --  27   BUN 60.4* 59.4* 52.4* 49.8* 42.1*  --  37.9*   CR 2.48* 2.62* 2.68* 2.97* 2.77*  --  2.76*   GLC 76 72 76 76 78 139* 76   SONIA 7.8* 7.4* 7.6* 7.4* 7.6*  --  7.6*     INRNo lab results found in last 7 days.   Attestation:   I have reviewed today's relevant vital signs, notes, medications, labs and imaging.    Hardy Falcon MD  [facility] - Nephrology  [phone]

## 2024-07-03 ENCOUNTER — APPOINTMENT (OUTPATIENT)
Dept: INTERVENTIONAL RADIOLOGY/VASCULAR | Facility: CLINIC | Age: 64
DRG: 205 | End: 2024-07-03
Attending: INTERNAL MEDICINE
Payer: COMMERCIAL

## 2024-07-03 LAB
ALBUMIN SERPL BCG-MCNC: 2.7 G/DL (ref 3.5–5.2)
ANION GAP SERPL CALCULATED.3IONS-SCNC: 9 MMOL/L (ref 7–15)
BUN SERPL-MCNC: 60 MG/DL (ref 8–23)
CALCIUM SERPL-MCNC: 7.8 MG/DL (ref 8.8–10.2)
CHLORIDE SERPL-SCNC: 93 MMOL/L (ref 98–107)
CREAT SERPL-MCNC: 2.28 MG/DL (ref 0.51–0.95)
DEPRECATED HCO3 PLAS-SCNC: 26 MMOL/L (ref 22–29)
EGFRCR SERPLBLD CKD-EPI 2021: 23 ML/MIN/1.73M2
ERYTHROCYTE [DISTWIDTH] IN BLOOD BY AUTOMATED COUNT: 13.7 % (ref 10–15)
GLUCOSE SERPL-MCNC: 73 MG/DL (ref 70–99)
HCT VFR BLD AUTO: 26.7 % (ref 35–47)
HGB BLD-MCNC: 8.6 G/DL (ref 11.7–15.7)
MCH RBC QN AUTO: 29.7 PG (ref 26.5–33)
MCHC RBC AUTO-ENTMCNC: 32.2 G/DL (ref 31.5–36.5)
MCV RBC AUTO: 92 FL (ref 78–100)
PHOSPHATE SERPL-MCNC: 5.6 MG/DL (ref 2.5–4.5)
PLATELET # BLD AUTO: 126 10E3/UL (ref 150–450)
POTASSIUM SERPL-SCNC: 4.6 MMOL/L (ref 3.4–5.3)
RBC # BLD AUTO: 2.9 10E6/UL (ref 3.8–5.2)
SODIUM SERPL-SCNC: 128 MMOL/L (ref 135–145)
WBC # BLD AUTO: 4.3 10E3/UL (ref 4–11)

## 2024-07-03 PROCEDURE — 250N000013 HC RX MED GY IP 250 OP 250 PS 637: Performed by: HOSPITALIST

## 2024-07-03 PROCEDURE — 94640 AIRWAY INHALATION TREATMENT: CPT

## 2024-07-03 PROCEDURE — 999N000157 HC STATISTIC RCP TIME EA 10 MIN

## 2024-07-03 PROCEDURE — 120N000001 HC R&B MED SURG/OB

## 2024-07-03 PROCEDURE — 250N000009 HC RX 250: Performed by: INTERNAL MEDICINE

## 2024-07-03 PROCEDURE — P9047 ALBUMIN (HUMAN), 25%, 50ML: HCPCS

## 2024-07-03 PROCEDURE — 82040 ASSAY OF SERUM ALBUMIN: CPT | Performed by: INTERNAL MEDICINE

## 2024-07-03 PROCEDURE — 250N000011 HC RX IP 250 OP 636: Performed by: INTERNAL MEDICINE

## 2024-07-03 PROCEDURE — 250N000013 HC RX MED GY IP 250 OP 250 PS 637: Performed by: INTERNAL MEDICINE

## 2024-07-03 PROCEDURE — 99232 SBSQ HOSP IP/OBS MODERATE 35: CPT | Performed by: INTERNAL MEDICINE

## 2024-07-03 PROCEDURE — 36589 REMOVAL TUNNELED CV CATH: CPT

## 2024-07-03 PROCEDURE — 94640 AIRWAY INHALATION TREATMENT: CPT | Mod: 76

## 2024-07-03 PROCEDURE — 250N000011 HC RX IP 250 OP 636

## 2024-07-03 PROCEDURE — 85027 COMPLETE CBC AUTOMATED: CPT | Performed by: INTERNAL MEDICINE

## 2024-07-03 PROCEDURE — 99232 SBSQ HOSP IP/OBS MODERATE 35: CPT | Performed by: HOSPITALIST

## 2024-07-03 RX ORDER — ALBUMIN (HUMAN) 12.5 G/50ML
12.5 SOLUTION INTRAVENOUS ONCE
Status: COMPLETED | OUTPATIENT
Start: 2024-07-03 | End: 2024-07-03

## 2024-07-03 RX ORDER — ALBUMIN (HUMAN) 12.5 G/50ML
12.5 SOLUTION INTRAVENOUS ONCE
Status: DISCONTINUED | OUTPATIENT
Start: 2024-07-03 | End: 2024-07-03

## 2024-07-03 RX ADMIN — BUMETANIDE 2 MG: 0.25 INJECTION, SOLUTION INTRAMUSCULAR; INTRAVENOUS at 01:32

## 2024-07-03 RX ADMIN — ACETAMINOPHEN 650 MG: 325 TABLET, FILM COATED ORAL at 15:19

## 2024-07-03 RX ADMIN — NICOTINE 1 PATCH: 14 PATCH, EXTENDED RELEASE TRANSDERMAL at 10:02

## 2024-07-03 RX ADMIN — POLYETHYLENE GLYCOL 3350 17 G: 17 POWDER, FOR SOLUTION ORAL at 10:01

## 2024-07-03 RX ADMIN — ACETAMINOPHEN 650 MG: 325 TABLET, FILM COATED ORAL at 06:45

## 2024-07-03 RX ADMIN — ROSUVASTATIN CALCIUM 10 MG: 10 TABLET, FILM COATED ORAL at 21:02

## 2024-07-03 RX ADMIN — CLOPIDOGREL BISULFATE 75 MG: 75 TABLET ORAL at 10:04

## 2024-07-03 RX ADMIN — HYDROMORPHONE HYDROCHLORIDE 2 MG: 2 TABLET ORAL at 06:45

## 2024-07-03 RX ADMIN — IPRATROPIUM BROMIDE AND ALBUTEROL SULFATE 3 ML: .5; 3 SOLUTION RESPIRATORY (INHALATION) at 15:26

## 2024-07-03 RX ADMIN — IPRATROPIUM BROMIDE AND ALBUTEROL SULFATE 3 ML: .5; 3 SOLUTION RESPIRATORY (INHALATION) at 10:25

## 2024-07-03 RX ADMIN — HYDROMORPHONE HYDROCHLORIDE 2 MG: 2 TABLET ORAL at 11:16

## 2024-07-03 RX ADMIN — ACETAMINOPHEN 650 MG: 325 TABLET, FILM COATED ORAL at 11:16

## 2024-07-03 RX ADMIN — CARVEDILOL 12.5 MG: 12.5 TABLET, FILM COATED ORAL at 17:57

## 2024-07-03 RX ADMIN — IPRATROPIUM BROMIDE AND ALBUTEROL SULFATE 3 ML: .5; 3 SOLUTION RESPIRATORY (INHALATION) at 19:57

## 2024-07-03 RX ADMIN — HYDROMORPHONE HYDROCHLORIDE 2 MG: 2 TABLET ORAL at 15:18

## 2024-07-03 RX ADMIN — CALCIUM ACETATE 667 MG: 667 CAPSULE ORAL at 10:04

## 2024-07-03 RX ADMIN — BUMETANIDE 2 MG: 0.25 INJECTION, SOLUTION INTRAMUSCULAR; INTRAVENOUS at 10:07

## 2024-07-03 RX ADMIN — FLUTICASONE FUROATE AND VILANTEROL TRIFENATATE 1 PUFF: 200; 25 POWDER RESPIRATORY (INHALATION) at 10:03

## 2024-07-03 RX ADMIN — Medication 250 MG: at 10:04

## 2024-07-03 RX ADMIN — CALCIUM ACETATE 667 MG: 667 CAPSULE ORAL at 17:57

## 2024-07-03 RX ADMIN — HYDROMORPHONE HYDROCHLORIDE 2 MG: 2 TABLET ORAL at 21:09

## 2024-07-03 RX ADMIN — BUMETANIDE 2 MG: 0.25 INJECTION, SOLUTION INTRAMUSCULAR; INTRAVENOUS at 17:57

## 2024-07-03 RX ADMIN — HYDROMORPHONE HYDROCHLORIDE 2 MG: 2 TABLET ORAL at 00:21

## 2024-07-03 RX ADMIN — FAMOTIDINE 10 MG: 10 TABLET, FILM COATED ORAL at 10:04

## 2024-07-03 RX ADMIN — ACETAMINOPHEN 650 MG: 325 TABLET, FILM COATED ORAL at 00:21

## 2024-07-03 RX ADMIN — ACETAMINOPHEN 650 MG: 325 TABLET, FILM COATED ORAL at 21:15

## 2024-07-03 RX ADMIN — CARVEDILOL 12.5 MG: 12.5 TABLET, FILM COATED ORAL at 10:04

## 2024-07-03 RX ADMIN — Medication 250 MG: at 21:02

## 2024-07-03 RX ADMIN — ALBUMIN HUMAN 12.5 G: 0.25 SOLUTION INTRAVENOUS at 17:12

## 2024-07-03 RX ADMIN — AMLODIPINE BESYLATE 10 MG: 10 TABLET ORAL at 10:04

## 2024-07-03 ASSESSMENT — ACTIVITIES OF DAILY LIVING (ADL)
ADLS_ACUITY_SCORE: 47
ADLS_ACUITY_SCORE: 51
ADLS_ACUITY_SCORE: 47
ADLS_ACUITY_SCORE: 46
ADLS_ACUITY_SCORE: 51
ADLS_ACUITY_SCORE: 47
ADLS_ACUITY_SCORE: 46
ADLS_ACUITY_SCORE: 51
ADLS_ACUITY_SCORE: 47
ADLS_ACUITY_SCORE: 51
ADLS_ACUITY_SCORE: 47
ADLS_ACUITY_SCORE: 51
ADLS_ACUITY_SCORE: 51

## 2024-07-03 NOTE — PROGRESS NOTES
[facility]     Renal Progress Note       SHORTHAND KEY FOR MY NOTES:  c = with, s = without, p = after, a = before, x = except, asx = asymptomatic, tx = transplant or treatment, sx = symptoms or symptomatic, cx = canceled or culture, rxn = reaction, yday = yesterday, nl = normal, abx = antibiotics, fxn = function, dx = diagnosis, dz = disease, m/h = melena/hematochezia, c/d/l/ha = cramping/dizziness/lightheadedness/headache, d/c = discharge or diarrhea/constipation, f/c/n/v = fevers/chills/nausea/vomiting, cp/sob = chest pain/shortness of breath, tbv = total body volume, rxn = reaction, tdc = tunneled dialysis catheter, pta = prior to admission, hd = hemodialysis, pd = peritoneal dialysis, hhd = home hemodialysis, edw = estimated dry wt         Assessment/Plan:     1.  LIMA/CKD IV.  Pt's cr is down to 2.3 today and she is feeling better. She is still TBV up but improving.  At this point, she hasn't dialysed in a while and seems to be in renal recovery.  It appears she has an element of cardiorenal syndrome given the improvement c diuresis.  A.  Continue IV bumet 2 mg iv tid + 25% alb x 1.  B.  Once she plateaus, then we will change to oral bumet alone (no alb) and see how she does managing fluids.  C.  Will ask IR to help c TDC removal today.  D.  Follow labs, uo, sx daily.    2.  Hyponatremia.  Na is a bit higher today as the diuretics cause more free water excretion.  A.  Follow Na daily.    3.  Anemia. Hb is stable.   A.  Follow hb, clinically.    4.  FEN.  Phos is improving c the binders.  Other electrolytes are fine.  A.  Follow electrolytes daily.  B.  Continue Ca acetate 667 mg TID AC.    Case d/w Dr. Staley.        Interval History:     Pt is doing ok and has no major complaints.  She is urinating more and is breathing better.  No f/c/n/v.  She is eating a bit better.          Medications and Allergies:     Current Facility-Administered Medications   Medication Dose Route Frequency  Provider Last Rate Last Admin    - MEDICATION INSTRUCTIONS for Dialysis Patients -   Does not apply See Admin Instructions Devin Ravi MD        albumin human 25 % injection 12.5 g  12.5 g Intravenous Once Hardy Falcon MD        amLODIPine (NORVASC) tablet 10 mg  10 mg Oral Daily Devin Ravi MD   10 mg at 07/03/24 1004    bumetanide (BUMEX) injection 2 mg  2 mg Intravenous Q8H Hardy Falcon MD   2 mg at 07/03/24 1007    calcium acetate (PHOSLO) capsule 667 mg  667 mg Oral TID w/meals Hardy Falcon MD   667 mg at 07/03/24 1004    carvedilol (COREG) tablet 12.5 mg  12.5 mg Oral BID w/meals Devin Ravi MD   12.5 mg at 07/03/24 1004    clopidogrel (PLAVIX) tablet 75 mg  75 mg Oral Daily Devin Ravi MD   75 mg at 07/03/24 1004    famotidine (PEPCID) tablet 10 mg  10 mg Oral Daily Devin Ravi MD   10 mg at 07/03/24 1004    fluticasone-vilanterol (BREO ELLIPTA) 200-25 MCG/ACT inhaler 1 puff  1 puff Inhalation Daily Bruce Ulloa MD   1 puff at 07/03/24 1003    ipratropium - albuterol 0.5 mg/2.5 mg/3 mL (DUONEB) neb solution 3 mL  3 mL Nebulization 4x daily Rod Nath MD   3 mL at 07/03/24 1025    nicotine (NICODERM CQ) 14 MG/24HR 24 hr patch 1 patch  1 patch Transdermal Daily Bruce Ulloa MD   1 patch at 07/03/24 1002    polyethylene glycol (MIRALAX) Packet 17 g  17 g Oral Daily Bruce Ulloa MD   17 g at 07/03/24 1001    rosuvastatin (CRESTOR) tablet 10 mg  10 mg Oral At Bedtime Bethanie Staley MD   10 mg at 06/22/24 2159    saccharomyces boulardii (FLORASTOR) capsule 250 mg  250 mg Oral BID Bruce Ulloa MD   250 mg at 07/03/24 1004    sodium chloride (PF) 0.9% PF flush 3 mL  3 mL Intracatheter Q8H Bruce Ulloa MD   3 mL at 07/03/24 1007     Allergies   Allergen Reactions    Contrast Dye Anaphylaxis     Pt reported facial and throat swelling with prior CT contrast    Pantoprazole      Protonix caused diffuse edema    Chantix [Varenicline]       Terrible dreams    Gluten Meal GI Disturbance     Pt has celiac disease    Penicillins Itching and Rash          Physical Exam:     Vitals were reviewed     , Blood pressure (!) 155/67, pulse 59, temperature 97.6  F (36.4  C), temperature source Oral, resp. rate 17, weight 52.3 kg (115 lb 4.8 oz), SpO2 90%, not currently breastfeeding.  Wt Readings from Last 3 Encounters:   07/03/24 52.3 kg (115 lb 4.8 oz)   06/05/24 60.3 kg (132 lb 15 oz)   05/29/24 62.3 kg (137 lb 5.6 oz)     Intake/Output Summary (Last 24 hours) at 7/3/2024 1224  Last data filed at 7/3/2024 1000  Gross per 24 hour   Intake 768 ml   Output 2200 ml   Net -1432 ml     GENERAL APPEARANCE: pleasant, NAD  RESP:  CTA B c good efforts  CV: RRR, nl S1/S2   ABDOMEN: s/nt/nd  EXTREMITIES/SKIN: 1+ LLE edema (improved), R AKA  ACCESS:  + RIJ TDC         Data:     CBC RESULTS:     Recent Labs   Lab 07/03/24  0556 07/02/24  0603 07/01/24  0609 06/30/24  0629 06/29/24  0621 06/28/24  0552   WBC 4.3 4.2 3.7* 4.0 4.8 4.8   RBC 2.90* 2.97* 2.67* 2.67* 2.71* 2.93*   HGB 8.6* 9.1* 7.9* 8.3* 8.4* 9.0*   HCT 26.7* 27.4* 24.3* 24.7* 25.5* 27.3*   * 117* 115* 132* 145* 168     Basic Metabolic Panel:  Recent Labs   Lab 07/03/24  0556 07/02/24  0603 07/01/24  0609 06/30/24  0629 06/29/24  0621 06/28/24  0552   * 126* 125* 126* 126* 129*   POTASSIUM 4.6 5.1 4.9 4.6 4.6 4.4   CHLORIDE 93* 91* 91* 91* 91* 94*   CO2 26 27 27 26 26 26   BUN 60.0* 60.4* 59.4* 52.4* 49.8* 42.1*   CR 2.28* 2.48* 2.62* 2.68* 2.97* 2.77*   GLC 73 76 72 76 76 78   SONIA 7.8* 7.8* 7.4* 7.6* 7.4* 7.6*     INRNo lab results found in last 7 days.   Attestation:   I have reviewed today's relevant vital signs, notes, medications, labs and imaging.    Hardy Falcon MD  [facility] Nephrology  [phone]

## 2024-07-03 NOTE — PLAN OF CARE
Goal Outcome Evaluation:      Plan of Care Reviewed With: patient    Overall Patient Progress: improvingOverall Patient Progress: improving     Date & Time: 4120-3709  Surgery/POD#: transferred from [facility] on 6/30. Here w/ hyperkalemia, pleural effusion, acute hypoxic respiratory failure & LIMA. POD 6 from a bronchoscopy to remove mucous plugs.  Behavior & Aggression: has a flat affect   Fall Risk: Yes.  Orientation:A&Ox4.  ABNL VS/O2:VSS on 1L of o2.  ABNL Labs: see chart. Hgb 9.1, Crt 2.48  Pain Management:PRN Tylenol & Dilaudid x1  Bowel/Bladder: Incontinent of B/B, purewick in place - for strict I&O's, no BM, passing gas.  IV/Drains: L PICC SL. R CVC in place.  Wounds/incisions: R AKA stump wound is C/D/I. Sacrum wound is C/D/I - mepliex in place. Old chest tube (L flank) site is C/D/I. Noted 3+ LLE edema.  Diet:Renal diet w/ 1800ml fluid restriction.  Activity Level: Wheelchair bound - Ax2 w/ lift devices & cares, T&R Q 2hrs as tolerated.  Tests/Procedures: N/A.  Anticipated  DC Date: TBD.

## 2024-07-03 NOTE — PLAN OF CARE
Goal Outcome Evaluation:  Date/Time: 7/3/24 7244-2120    [place]POD#: [place] [facility] [place] Here w/ hyperkalemia, pleural effusion, acute hypoxic respiratory failure & LIMA. POD 7 from a bronchoscopy to remove mucous plugs.  Orientation: A&Ox4  Behavior & Aggression: green, has flat affect  Activity: Ax2, lift. T&R, Q2. W/C in room, is pt's personal chair from home.  Diet: renal, 1800 mL FR  Fall risk: yes  Isolation: N/A  Pain:  mg oral Tylenol and PRN 2 mg oral dilaudid given for pain.  B&B: continent, no BM this shift.  VS/O2: VSS, 1 L NC. Hypertensive, has PRN hydralazine.  IV: L double lumen PICC, SL. R internal jugular.  Drains/Devices: purewick in place.  Tele: N/A  Abnormal Labs: none this shift.  Skin: R AKA, has wound to stump. Dressing C/DI, changed today next dressing change is 7/5/24. Has sacral wound.  Consults/Procedures: N/A  D/C Date: TBD  Other:I&O's.

## 2024-07-03 NOTE — IR NOTE
Interventional Radiology Intra-procedural Nursing Note    Patient Name: Shirley Hendricks  Medical Record Number: [id number]  Today's Date: July 3, 2024    Procedure: Right tunneled dialysis catheter removal  Start time: 1408  End time: 1413  Report provided to: MARISSA Hinojosa  Patient depart time and location: 1415 to Room 2324    Note: Patient entered Interventional Radiology Suite number 2 via cart. Patient awake, alert and oriented. Assisted onto procedural table in supine position. Prepped and draped.  Dr. Vogel in room. Time out and procedure started.     Procedure well tolerated by patient without complications. Procedure end with debrief by Dr. Vogel.  Manual pressure applied until hemostasis achieved at 1414 . Quick clot and tegaderm dressing applied to right interventional procedure access site, dressing is c/d/I.

## 2024-07-03 NOTE — PROGRESS NOTES
[facility]    Hospitalist Progress Note    Interval History   Assumed care.  No complaints, eating, bowels moving.  Right AKA, nonambulatory.  Denies dyspnea, cough, chest pain.    Assessment & Plan   Summary: Shirley Hendricks is a 63 year old female with PMH CAD, MI, peripheral vascular disease status angioplasty and right AKA with wound vac, DLE, COPD, CMT disease, tobacco use, CKD stage III, GERD, hypertension, history of B-cell lymphoma, hyperlipidemia, depression, anxiety, who was admitted on 6/21/2024 with acute hypoxic respiratory failure due to a large left pleural effusion, hyperkalemia, and hyponatremia.   Patient recently admitted to [facility] 3/29-4/22/24 with right limb ischemia in setting of severe PAD ultimately requiring right AKA with complicated post op course (including LIMA requiring HD) after which she was discharged to  ARU. She was readmitted to [facility] 5/15-5/29/24 with stump eschars requiring surgical debridement and wound VAC placement.   This admission, patient underwent left thoracentesis with 800mL and 1200mL fluid removed. Severe hyperkalemia improved with Lokelma. Patient had been anuric since admission, started on dialysis on 6/23 now with some renal recovery. Chest tube placed 6/23, CT on 6/24 shows ongoing left bronchus obstruction, felt to be mucous plugging per Pulm. Improved after dialysis 6/24, now making urine, O2 needs down to 2-3L.   S/p bronchoscopy 6/26 with mucous plugging removed. Chest tube to be removed 6/26. Lung re-expansion noted 6/26. Renal function stabilizing 6/28.     Left mainstem bronchial obstruction due to mucous plugging s/p bronchoscopy 6/26/2024  Acute hypoxic respiratory failure due to above, improved  Large left pleural effusion, transudative  S/p left thoracentesis 800mL 6/21, 1200mL 6/22  S/p left chest tube 6/23/2024  Right moderate pleural effusion  Patient with increasing shortness of breath for 1-2 weeks PTA.  "CXR 6/21 in ED reveals complete opacification of the left hemithorax. CT chest-large left pleural effusion with complete collapse of the left upper and left lower and occlusion of left mainstem bronchus and a moderate size right pleural effusion. Patient underwent thoracentesis 800mL on 6/21, fluid appears transudative. Patient appears markedly volume overloaded. In the ED she initially required 2L O2 but was transitioned to HFNC overnight 6/22 to 6/23 during RRT.  Reports symptomatic improvement with thoracentesis 6/21 and 6/22, but continues to require 10+L O2 or HFNC on 6/23. Discussed with Pulm on 6/23, concern remains left mainstem bronchus obstruction but need to rule out recurrent pleural effusion as cause, so chest tube placed on 6/23. CT chest on 6/24 shows persistent persistent left bronchial obstruction, per Pulm likely mucous plugging.  Bronchoscopy done 6/26 with aspiration of mucous plugs. Chest tube removed 6/26. Patient with chest pain due to lung re-expansion 6/26, CXR 6/27 shows improving lung expansion.   It appears that patient's major turnaround started following bronchoscopy on 6/26.    Breathing continues to improve on 6/30. Weaning oxygen.  resp cx with normal nadine.  No fungal elements seen on KOH.  - Pleural fluid cytology: 7/3/2024.  In Epic, cytology \"negative for malignancy, no fungal or pneumocystis organisms viral cystopathic effect negative.\"  - Pulmonology consult  Currently denies dyspnea, hypoxia, cough, sputum.,  On room air this afternoon.      Anuric renal failure on CKD, requiring brief dialysis, improved  Recent acute renal failure in May 2024 needing hemodialysis  Severe hyperkalemia, improved  Hyponatremia  Metabolic acidosis  Recent baseline creatinine has been fluctuating between 1.9-2.8. On admission Creatinine is 3.07, potassium is 7.5, sodium of 128 with bicarb of 17.   In the ER patient did receive albuterol, 2 g calcium gluconate, insulin, Kayexalate 10 g and 40 IV " "Lasix and 1 L IV fluid bolus. EKG showed sinus bradycardia with prolonged QT. Potassium has been elevated to 7.1 since admission, improved to 5.3 on 6/23 with Lokelma.  Patient has been anuric since admission, no urine output. Etiology of anuric renal failure likely related to left mainstem bronchus obstruction. Due to ongoing anuria, TDC placed 6/23 and dialyzed 6/23 and 6/24.  Started to making urine 6/25, creatinine has stabilized on 6/28 and now fluctuating 2.7-3  Had received HD.  - Nephrology consult.  Appreciate input, see note 7/2/2024 received IV albumin  CR stable to improved.  Hemoglobin increased likely suggesting the past was delusional.  No signs of active bleeding.  Another dose of albumin t given continue IV Bumex, follow clinically including BMP, may be able to pull HD catheter if continued improvement.\"  -7/3/2024 see nephrology note, discussed with Dr. Falcon.  In summary, HD catheter pulled per IR, continue IV Bumex plus albumin until plateaus then changed to oral Bumex.  Diet liberalized no potassium or phosphate restrictions, continue current diet of sodium restriction and gluten free.      Possible acute diastolic congestive heart failure exacerbation  Elevated troponin suspect due to volume overload  Patient presents with large left pleural effusion, BNP  elevated to 11.9k. Note history of stress cardiomyopathy in Oct 2023 with EF 30-35% which then resolved on echo Nov 2023.    Patient does not have any significant chest discomfort and troponin are 174 and 177 and trending down to 156. Echo 6/22 shows Ef 60-65%, mid anterior and lateral severe hypokinessis, distal inferior hypokinesis, normal RV function. Suspect large component of renal failure contributing. Felt less likely to be CHF as this was likely due to renal failure.   - Cardiology consult   - Nephrology, see above  - Tele, weights, I&O  - Diuresis per nephro/cards.    Systolic murmur: Noted systolic murmur 6/23, limited echo 6/24 " shows no change from 6/22, likely flow murmur. Murmur improved 6/28.    Anemia of chronic disease  Acute anemia 6/23 likely spurious  Recent baseline hemoglobin has been fluctuating and has been between 8.4-10.1.   Hemoglobin 8.4 on 6/22-->6.4 on 6/23. Patient denies blood loss. Bilirubin is not elevated makes hemolysis less likely. Patient was given 1u pRBC, recheck Hgb was 9.5 via skin draw. The Hgb of 6.4 likely was spurious related to an inaccurate PICC line draw, likely Hgb was 8.5-->9.5 with 1u pRBC.  - [facility] consult appreciated, signed off  - Daily Hgb checks, see above    Hypertension  Hyperlipidemia  History of coronary disease with an MI in 2019  Left heart cath on 10/4/2023 showed-completely occluded D1 with left to left collaterals from distal LAD to D1, moderate CAD involving proximal to mid RCA not significant by IFR and moderate coronary disease please involving distal small caliber RPL and distal circumflex artery. Last echo 11/23 showed EF of 55 to 60% with normal RV and moderate trileaflet aortic sclerosis  - Resume PTA Coreg 6/28  - Continue amlodipine 10mg daily  -Prior hospitalist held patient's rosuvastatin, unclear reason.  Discussed with patient who denies myalgia, normal LFTs.  In patient with severe PAD restart rosuvastatin, close monitor.  LFTs in a.m.    History of peripheral vascular disease status above-knee amputation on the right with wound dressing status washout on 5/24  Neuropathic pain  Patient has followed with vascular surgery and has noticed during last hospitalization in May 2024 she underwent washout with wound VAC placement and was discharged on Plavix and Crestor. On exam the amputation stump is currently wrapped, no obvious discharge from what I can see on exam   - Continue with PTA Plavix 75 mg daily  - wound vac now removed, vascular surgery consult appreciated  - Pain control with hydromorphone oral PRN (avoid oxycodone as it is renally cleared)  -See above related  to rosuvastatin.    COPD not in exacerbation  - On exam has no wheezing and continue with as needed symbicort    GERD: Famotidine BID    History of B-cell lymphoma  - Follows with [place] [facility]  - Pending cytology from thoracentesis     Tobacco use  - Continue with nicotine patch    Ongoing stressors  Patient had a recent AKA in 2024 with complicated post op course 2024, and reports   2024.    Clinically Significant Risk Factors         # Hyponatremia: Lowest Na = 126 mmol/L in last 2 days, will monitor as appropriate      # Hypoalbuminemia: Lowest albumin = 2.1 g/dL at 2024  6:21 AM, will monitor as appropriate   # Thrombocytopenia: Lowest platelets = 117 in last 2 days, will monitor for bleeding   # Hypertension: Noted on problem list             #Precipitous drop in Hgb/Hct: Lowest Hgb this hospitalization: 6.4 g/dL. Will continue to monitor and treat/transfuse as appropriate.      # Moderate Malnutrition: based on nutrition assessment      # Financial/Environmental Concerns: none          PT/OT: ordered  Diet: Snacks/Supplements Adult: Expedite Bottle; Between Meals  Combination Diet Gluten Free Diet; 2 gm NA Diet    DVT Prophylaxis: none.  Encourage patient out of bed, up and sitting with meals.  Alvarez Catheter: Not present  Lines: PRESENT      PICC 24 Double Lumen Left Brachial vein medial access-Site Assessment: WDL  CVC Double Lumen Right Subclavian Tunneled-Site Assessment: WDL    Cardiac Monitoring: None  Code Status: Full Code    Medically Ready for Discharge: >2days pending LIMA,     Bethanie Staley MD  Hospitalist Service  [facility]    I spent 35 minutes total time in management of care today 7/3/2024 reviewing labs, medications, interdisciplinary notes; and completing documentation of encounter and orders with Care Management including counseling/discussion with the Patient regarding LIMA diet, statin, and Coordinating Care  and plan with Nursing and Specialists, Nephrology, Dr Falcon regarding management and surveillance, as above      Data reviewed today: I reviewed all new labs and imaging results over the last 24 hours.    Physical Exam   Temp: 97.6  F (36.4  C) Temp src: Oral BP: (!) 155/67 Pulse: 59   Resp: 17 SpO2: 90 % O2 Device: Nasal cannula Oxygen Delivery: 1 LPM  Vitals:    07/01/24 0554 07/02/24 0600 07/03/24 0650   Weight: 58.9 kg (129 lb 13.6 oz) 55.3 kg (121 lb 14.6 oz) 52.3 kg (115 lb 4.8 oz)     General/Constitutional:   NAD, alert, calm, cooperative  Chest/Respiratory: Respirations nonlabored room air  Cardiovascular:  regular, no murmur appreciated.  LE edema none status post right AKA  Gastrointestinal/Abdomen:  soft, nontender, PureWick present, clear yellow urine.    Neuro.  Gross motor tested, nonfocal, R AKA  Psych oriented, affect: Fairly flat.    Medications   Current Facility-Administered Medications   Medication Dose Route Frequency Provider Last Rate Last Admin    No lozenges or gum should be given while patient on BIPAP/AVAPS/AVAPS AE   Does not apply Continuous PRN Devin Ravi MD        Patient may continue current oral medications   Does not apply Continuous PRN Devin Ravi MD         Current Facility-Administered Medications   Medication Dose Route Frequency Provider Last Rate Last Admin    - MEDICATION INSTRUCTIONS for Dialysis Patients -   Does not apply See Admin Instructions Devin Ravi MD        albumin human 25 % injection 12.5 g  12.5 g Intravenous Once Yanira Mario, [facility]        amLODIPine (NORVASC) tablet 10 mg  10 mg Oral Daily Devin Ravi MD   10 mg at 07/03/24 1004    bumetanide (BUMEX) injection 2 mg  2 mg Intravenous Q8H Hardy Falcon MD   2 mg at 07/03/24 1007    calcium acetate (PHOSLO) capsule 667 mg  667 mg Oral TID w/meals Hardy Falcon MD   667 mg at 07/03/24 1004    carvedilol (COREG) tablet 12.5 mg  12.5 mg Oral BID w/meals Devin Ravi  MD Lis   12.5 mg at 07/03/24 1004    clopidogrel (PLAVIX) tablet 75 mg  75 mg Oral Daily Devin Ravi MD   75 mg at 07/03/24 1004    famotidine (PEPCID) tablet 10 mg  10 mg Oral Daily Devin Ravi MD   10 mg at 07/03/24 1004    fluticasone-vilanterol (BREO ELLIPTA) 200-25 MCG/ACT inhaler 1 puff  1 puff Inhalation Daily Bruce Ulloa MD   1 puff at 07/03/24 1003    ipratropium - albuterol 0.5 mg/2.5 mg/3 mL (DUONEB) neb solution 3 mL  3 mL Nebulization 4x daily Rod Nath MD   3 mL at 07/03/24 1025    nicotine (NICODERM CQ) 14 MG/24HR 24 hr patch 1 patch  1 patch Transdermal Daily Bruce Ulloa MD   1 patch at 07/03/24 1002    polyethylene glycol (MIRALAX) Packet 17 g  17 g Oral Daily Bruce Ulloa MD   17 g at 07/03/24 1001    rosuvastatin (CRESTOR) tablet 10 mg  10 mg Oral At Bedtime HachiyaBethanie MD   10 mg at 06/22/24 2159    saccharomyces boulardii (FLORASTOR) capsule 250 mg  250 mg Oral BID Bruce Ulloa MD   250 mg at 07/03/24 1004    sodium chloride (PF) 0.9% PF flush 3 mL  3 mL Intracatheter Q8H Bruce Ulloa MD   3 mL at 07/03/24 1007       Data   Recent Labs   Lab 07/03/24  0556 07/02/24  0603 07/01/24  0609   WBC 4.3 4.2 3.7*   HGB 8.6* 9.1* 7.9*   MCV 92 92 91   * 117* 115*   * 126* 125*   POTASSIUM 4.6 5.1 4.9   CHLORIDE 93* 91* 91*   CO2 26 27 27   BUN 60.0* 60.4* 59.4*   CR 2.28* 2.48* 2.62*   ANIONGAP 9 8 7   SONIA 7.8* 7.8* 7.4*   GLC 73 76 72   ALBUMIN 2.7* 2.6* 2.3*       Imaging:   No results found for this or any previous visit (from the past 24 hour(s)).

## 2024-07-03 NOTE — PROGRESS NOTES
Transfer Note    Patient transferred to Ortho via cart accompanied by staff after going .  Report called to ortho nurse. Discussed plan of care with patient.  Reviewed belongings checklist - checklist and belongings sent with patient: Yes

## 2024-07-03 NOTE — PLAN OF CARE
Date/Time: 7/2/24 0559-4284    [place]POD#: [place] [facility] [place] w/ hyperkalemia, pleural effusion, acute hypoxic respiratory failure & LIMA. POD 7 from a bronchoscopy to remove mucous plugs.  Orientation: A&Ox4  Behavior & Aggression: green, has flat affect  Activity: Ax2, lift. T&R, Q2. W/C in room, is pt's personal chair from home.  Diet: renal, 1800 mL FR  Fall risk: yes  Isolation: N/A  Pain:  mg oral Tylenol and PRN 2 mg oral dilaudid given for pain.  B&B: continent, no BM this shift.  VS/O2: VSS, 1 L NC. Hypertensive, has PRN hydralazine.  IV: L double lumen PICC, SL. R internal jugular.  Drains/Devices: purewick in place.  Tele: N/A  Abnormal Labs: none this shift.  Skin: R AKA, has wound to stump. Dressing C/DI. Has sacral wound, R to turn for RN. +3 edema in RLE.  Consults/Procedures: N/A  D/C Date: TBD  Other: strict I&O's.

## 2024-07-03 NOTE — PLAN OF CARE
Goal Outcome Evaluation:      Plan of Care Reviewed With: patient    Overall Patient Progress: improvingOverall Patient Progress: improving    Date & Time: 07/02-07/03: 5745-0916  Surgery/POD#: transferred from [facility] [place] on [place]30. Here w/ hyperkalemia, pleural effusion, acute hypoxic respiratory failure & LIMA. POD 7 from a bronchoscopy to remove mucous plugs.  Behavior & Aggression: has a flat affect   Fall Risk: Yes.  Orientation:A&Ox4.  ABNL VS/O2:VSS on 1L of o2.  ABNL Labs: see chart. Hgb 9.1, Crt 2.48  Pain Management:PRN Tylenol & Dilaudid x2  Bowel/Bladder: Incontinent of B/B, purewick in place - for strict I&O's, no BM, passing gas. Large urine output as pt is on bumex  IV/Drains: L PICC SL. R CVC in place.  Wounds/incisions: R AKA stump wound is C/D/I. Sacrum wound is C/D/I - pt weight shifting and turn and repos, mepliex in place. Old chest tube (L flank) site is C/D/I. Noted 3+ LLE edema.  Diet:Renal diet w/ 1800ml fluid restriction.  Activity Level: Wheelchair bound - Ax2 w/ lift devices & cares, T&R Q 2hrs as tolerated.  Tests/Procedures: N/A.  Anticipated  DC Date: TBD.

## 2024-07-03 NOTE — PROGRESS NOTES
"CLINICAL NUTRITION SERVICES - REASSESSMENT NOTE    Recommendations Ordered by Registered Dietitian (RD):   - Patient reports she has Celiac Disease. With her permission, added Gluten Free diet & Gluten Allergy in EPIC. (Note - past admissions pt has declined this diet restriction, however today she is adamant that she has Celiac and wants it in her chart.)   - Discontinue Special K bar - this has wheat, previously allowed as Gluten Free was not in chart. (Did see a comment, however comments do not interface with meal ordering system).  - Continue Expedite supplement.   - Discussed diet - Renal diet (non-dialysis) restricts sodium, potassium, phos, protein. Pt confused as she was told to eat high protein for wound healing. Discussed the appropriate threshold of adequate protein without going over the 60 g/day limit.     Malnutrition:      % Weight Loss:  None noted - fluctuating fluid status  % Intake:  No decreased intake noted  Subcutaneous Fat Loss:  Orbital region mild depletion and Upper arm region mild depletion  Muscle Loss:  Global Mild   Fluid Retention:  Moderate 3+ LE to mild 2+ generalized      Malnutrition Diagnosis: Moderate malnutrition  In Context of:  Acute illness or injury     EVALUATION OF PROGRESS TOWARD GOALS   Diet: Renal diet (includes sodium, phos, potassium, protein)  2 pm - special K bar and Expedite   1800 mL   Intake/Tolerance:   - eating 25-50% of meals. Fair to good appetite. Pt reports eating \"Better\".   - Discussed Gluten Free diet for Celiac, importance of adequate protein while not going beyond daily limit of 60 g.   - She is drinking the Expedite, however can't have the Special K bar due to wheat (will no longer be allowed with Gluten allergy and Gluten free diet ordered - removed order as well).   - Declined all offers of glucerna, nepro, gelatein, magic cup, etc.       - Labs:   Na 128 (L) - trending low  BUN 60 (H), Cr 2.28 (H)  GFR 23 (L)  Phos 5.6 (H)  - Stoolin/2 - " "BM x1  6/30 - BM x1  - Weight:   Date/Time Weight Weight Method   07/03/24 0650 52.3 kg (115 lb 4.8 oz) Bed scale   07/02/24 0600 55.3 kg (121 lb 14.6 oz) Bed scale   07/01/24 0554 58.9 kg (129 lb 13.6 oz) Bed scale   06/30/24 0605 50.6 kg (111 lb 8.8 oz) Bed scale   06/28/24 0600 58 kg (127 lb 13.9 oz) Bed scale   06/27/24 0557 57 kg (125 lb 10.6 oz) Bed scale   06/26/24 0623 57.3 kg (126 lb 5.2 oz) Bed scale   06/24/24 0605 58.3 kg (128 lb 8.5 oz)      Wt Readings from Last 5 Encounters:   07/03/24 52.3 kg (115 lb 4.8 oz)   06/05/24 60.3 kg (132 lb 15 oz)   05/29/24 62.3 kg (137 lb 5.6 oz)   05/14/24 57 kg (125 lb 10.6 oz)   04/22/24 50.5 kg (111 lb 5.3 oz)     - Meds:   Bumex TID  PhosLo TID w/ meals     ANTHROPOMETRICS  Height: 5' 1\"  Weight: 115 lbs 4.81 oz (52.3 kg)   Body mass index is 24 kg/m  - Adjusted for AKA  Weight Status:  Normal BMI  IBW: 43 kg - adjusted for AKA  % IBW: 122%  Weight History:   Wt Readings from Last 10 Encounters:   07/03/24 52.3 kg (115 lb 4.8 oz)   06/05/24 60.3 kg (132 lb 15 oz)   05/29/24 62.3 kg (137 lb 5.6 oz)   05/14/24 57 kg (125 lb 10.6 oz)   04/22/24 50.5 kg (111 lb 5.3 oz)   01/08/24 49.8 kg (109 lb 12.8 oz)   12/12/23 50.8 kg (112 lb)   11/21/23 50.6 kg (111 lb 8 oz)   11/13/23 51.6 kg (113 lb 11.2 oz)   10/11/23 52.5 kg (115 lb 11.2 oz)       ASSESSED NUTRITION NEEDS PER APPROVED PRACTICE GUIDELINES:  Dosing weight: 45 kg - adjusted   Estimated Energy Needs: 2031-7668 kcals (25-30 Kcal/Kg)  Justification: wound healing   Estimated Protein Needs: 45-60 grams protein (1-1.2 g pro/Kg)  Justification: wound healing and CKD  Estimated Fluid Needs: Per provider     NEW FINDINGS:   WOCN 7/1 -   Coccyx: 6/27 very small re-opened area, over the site of a recent prior full-thickness pressure injury which had healed.   WOC initially saw pt 6/24 and signed off as coccyx was then intact.    Right AKA: Incidental finding 6/24, woc was following right AKA wound on previous admission.  " Stopped wound vac 6/24 and switched to topical dressings due to healed to skin level at portions of the wound base. (This wound not assessed 6/27)    Previous Goals:   >75% of meal trays, or the equivalent in supplements, TID   Evaluation: Not met consistently     Previous Nutrition Diagnosis:   Inadequate oral intake related to increased needs (protein) as evidenced by needs >1.2 g/kg/d to support wound healing and oral nutrition supplements to help meet needs.   Evaluation: Completed    CURRENT NUTRITION DIAGNOSIS  Inadequate oral intake related to fluctuating appetite as evidenced by intakes of 25-50% of meals with fair to good appetite.    INTERVENTIONS  Recommendations / Nutrition Prescription  - Add Gluten Free diet and Gluten allergy (see above for details)  - Discontinue Special K Bar  - Continue Expedite for wound  - Discussed diet order and rationale     Implementation  Medical Food Supplement: updated orders  Nutrition Education: discussed renal diet in context of wounds. Protect kidneys while eating adequate protein for healing.     Goals  Intake of at least 75% adequate trays TID.     MONITORING AND EVALUATION:  Progress towards goals will be monitored and evaluated per protocol and Practice Guidelines    Cordelia Fam RD, LD  Pager: [phone]

## 2024-07-03 NOTE — PROGRESS NOTES
Care Management Follow Up    Length of Stay (days): 12    Expected Discharge Date: 07/05/2024     Concerns to be Addressed: discharge planning  none  Patient plan of care discussed at interdisciplinary rounds: Yes    Anticipated Discharge Disposition: Home     Anticipated Discharge Services: resumption of Home Care through [facility], patient is open to RN, PT, OT  Anticipated Discharge DME: short supply of new dressings    Patient/family educated on Medicare website which has current facility and service quality ratings:    Education Provided on the Discharge Plan:  yes  Patient/Family in Agreement with the Plan: yes    Referrals Placed by CM/SW:  None  Private pay costs discussed: Not applicable    Additional Information:  Follow up with patient today. Writer called [employer] and spoke to [place]. Per [place] patient is open to Home RN, PT, OT. Home RN was doing wound vac dressing changes, updated agency that patient will not need wound vac anymore ( Discussed with Dr. Lozano, no wound vac needed) but will have every other day dressing changes.   Resumption orders should be faxed to agency when ready to discharge.     Patient inquired about what to do about home wound vac. Patient has in her room currently an Activac with serial number [id number] through [employer]. Discussed with [facility] rep Marleen Hoffman and patient should return vac when she gets home. Will need to place in the black box and use the blue bag (patient has this at home) for returns and call the [phone] for pickup of device. Patient stated and understanding and plans to send the vac home with family when she sees them next likely today or tomorrow.     Leonor Mcguire RN   [facility]   Phone [phone], [employer] or [phone]

## 2024-07-04 LAB
ALBUMIN SERPL BCG-MCNC: 2.5 G/DL (ref 3.5–5.2)
ALP SERPL-CCNC: 51 U/L (ref 40–150)
ALT SERPL W P-5'-P-CCNC: 15 U/L (ref 0–50)
ANION GAP SERPL CALCULATED.3IONS-SCNC: 10 MMOL/L (ref 7–15)
AST SERPL W P-5'-P-CCNC: 13 U/L (ref 0–45)
BILIRUB DIRECT SERPL-MCNC: <0.2 MG/DL (ref 0–0.3)
BILIRUB SERPL-MCNC: <0.2 MG/DL
BUN SERPL-MCNC: 61.1 MG/DL (ref 8–23)
CALCIUM SERPL-MCNC: 7.9 MG/DL (ref 8.8–10.2)
CHLORIDE SERPL-SCNC: 93 MMOL/L (ref 98–107)
CREAT SERPL-MCNC: 2.23 MG/DL (ref 0.51–0.95)
DEPRECATED HCO3 PLAS-SCNC: 28 MMOL/L (ref 22–29)
EGFRCR SERPLBLD CKD-EPI 2021: 24 ML/MIN/1.73M2
ERYTHROCYTE [DISTWIDTH] IN BLOOD BY AUTOMATED COUNT: 13.6 % (ref 10–15)
FERRITIN SERPL-MCNC: 372 NG/ML (ref 11–328)
GLUCOSE SERPL-MCNC: 74 MG/DL (ref 70–99)
HCT VFR BLD AUTO: 24.9 % (ref 35–47)
HGB BLD-MCNC: 8 G/DL (ref 11.7–15.7)
IRON BINDING CAPACITY (ROCHE): 126 UG/DL (ref 240–430)
IRON SATN MFR SERPL: 34 % (ref 15–46)
IRON SERPL-MCNC: 43 UG/DL (ref 37–145)
MCH RBC QN AUTO: 30 PG (ref 26.5–33)
MCHC RBC AUTO-ENTMCNC: 32.1 G/DL (ref 31.5–36.5)
MCV RBC AUTO: 93 FL (ref 78–100)
PHOSPHATE SERPL-MCNC: 5.7 MG/DL (ref 2.5–4.5)
PLATELET # BLD AUTO: 123 10E3/UL (ref 150–450)
POTASSIUM SERPL-SCNC: 4.8 MMOL/L (ref 3.4–5.3)
PROT SERPL-MCNC: 4.7 G/DL (ref 6.4–8.3)
RBC # BLD AUTO: 2.67 10E6/UL (ref 3.8–5.2)
SODIUM SERPL-SCNC: 131 MMOL/L (ref 135–145)
WBC # BLD AUTO: 4.5 10E3/UL (ref 4–11)

## 2024-07-04 PROCEDURE — 83550 IRON BINDING TEST: CPT | Performed by: INTERNAL MEDICINE

## 2024-07-04 PROCEDURE — 250N000009 HC RX 250: Performed by: INTERNAL MEDICINE

## 2024-07-04 PROCEDURE — 250N000013 HC RX MED GY IP 250 OP 250 PS 637: Performed by: HOSPITALIST

## 2024-07-04 PROCEDURE — 85027 COMPLETE CBC AUTOMATED: CPT | Performed by: INTERNAL MEDICINE

## 2024-07-04 PROCEDURE — 84155 ASSAY OF PROTEIN SERUM: CPT | Performed by: HOSPITALIST

## 2024-07-04 PROCEDURE — 82248 BILIRUBIN DIRECT: CPT | Performed by: HOSPITALIST

## 2024-07-04 PROCEDURE — 84460 ALANINE AMINO (ALT) (SGPT): CPT | Performed by: HOSPITALIST

## 2024-07-04 PROCEDURE — 250N000011 HC RX IP 250 OP 636: Performed by: INTERNAL MEDICINE

## 2024-07-04 PROCEDURE — 84450 TRANSFERASE (AST) (SGOT): CPT | Performed by: HOSPITALIST

## 2024-07-04 PROCEDURE — 120N000001 HC R&B MED SURG/OB

## 2024-07-04 PROCEDURE — 84075 ASSAY ALKALINE PHOSPHATASE: CPT | Performed by: HOSPITALIST

## 2024-07-04 PROCEDURE — 250N000013 HC RX MED GY IP 250 OP 250 PS 637: Performed by: INTERNAL MEDICINE

## 2024-07-04 PROCEDURE — 80069 RENAL FUNCTION PANEL: CPT | Performed by: INTERNAL MEDICINE

## 2024-07-04 PROCEDURE — 84100 ASSAY OF PHOSPHORUS: CPT | Performed by: INTERNAL MEDICINE

## 2024-07-04 PROCEDURE — 82728 ASSAY OF FERRITIN: CPT | Performed by: INTERNAL MEDICINE

## 2024-07-04 PROCEDURE — 99232 SBSQ HOSP IP/OBS MODERATE 35: CPT | Performed by: INTERNAL MEDICINE

## 2024-07-04 PROCEDURE — 99231 SBSQ HOSP IP/OBS SF/LOW 25: CPT | Performed by: HOSPITALIST

## 2024-07-04 RX ORDER — BUMETANIDE 1 MG/1
2 TABLET ORAL
Status: DISCONTINUED | OUTPATIENT
Start: 2024-07-04 | End: 2024-07-07

## 2024-07-04 RX ADMIN — CLOPIDOGREL BISULFATE 75 MG: 75 TABLET ORAL at 09:14

## 2024-07-04 RX ADMIN — CARVEDILOL 12.5 MG: 12.5 TABLET, FILM COATED ORAL at 17:38

## 2024-07-04 RX ADMIN — ACETAMINOPHEN 650 MG: 325 TABLET, FILM COATED ORAL at 13:35

## 2024-07-04 RX ADMIN — HYDROMORPHONE HYDROCHLORIDE 2 MG: 2 TABLET ORAL at 20:26

## 2024-07-04 RX ADMIN — ROSUVASTATIN CALCIUM 10 MG: 10 TABLET, FILM COATED ORAL at 21:53

## 2024-07-04 RX ADMIN — CARVEDILOL 12.5 MG: 12.5 TABLET, FILM COATED ORAL at 09:13

## 2024-07-04 RX ADMIN — Medication 250 MG: at 09:13

## 2024-07-04 RX ADMIN — ACETAMINOPHEN 650 MG: 325 TABLET, FILM COATED ORAL at 20:26

## 2024-07-04 RX ADMIN — HYDROMORPHONE HYDROCHLORIDE 2 MG: 2 TABLET ORAL at 23:58

## 2024-07-04 RX ADMIN — CALCIUM ACETATE 667 MG: 667 CAPSULE ORAL at 11:29

## 2024-07-04 RX ADMIN — BUMETANIDE 2 MG: 1 TABLET ORAL at 11:29

## 2024-07-04 RX ADMIN — BUMETANIDE 2 MG: 1 TABLET ORAL at 17:38

## 2024-07-04 RX ADMIN — BUMETANIDE 2 MG: 0.25 INJECTION, SOLUTION INTRAMUSCULAR; INTRAVENOUS at 02:18

## 2024-07-04 RX ADMIN — Medication 250 MG: at 20:26

## 2024-07-04 RX ADMIN — IPRATROPIUM BROMIDE AND ALBUTEROL SULFATE 3 ML: .5; 3 SOLUTION RESPIRATORY (INHALATION) at 08:03

## 2024-07-04 RX ADMIN — BUMETANIDE 2 MG: 1 TABLET ORAL at 09:14

## 2024-07-04 RX ADMIN — NICOTINE 1 PATCH: 14 PATCH, EXTENDED RELEASE TRANSDERMAL at 09:12

## 2024-07-04 RX ADMIN — HYDROMORPHONE HYDROCHLORIDE 2 MG: 2 TABLET ORAL at 13:35

## 2024-07-04 RX ADMIN — CALCIUM ACETATE 667 MG: 667 CAPSULE ORAL at 09:13

## 2024-07-04 RX ADMIN — AMLODIPINE BESYLATE 10 MG: 10 TABLET ORAL at 09:14

## 2024-07-04 RX ADMIN — ACETAMINOPHEN 650 MG: 325 TABLET, FILM COATED ORAL at 09:21

## 2024-07-04 RX ADMIN — IPRATROPIUM BROMIDE AND ALBUTEROL SULFATE 3 ML: .5; 3 SOLUTION RESPIRATORY (INHALATION) at 10:48

## 2024-07-04 RX ADMIN — CALCIUM ACETATE 667 MG: 667 CAPSULE ORAL at 17:38

## 2024-07-04 RX ADMIN — FAMOTIDINE 10 MG: 10 TABLET, FILM COATED ORAL at 09:14

## 2024-07-04 RX ADMIN — HYDROMORPHONE HYDROCHLORIDE 2 MG: 2 TABLET ORAL at 09:21

## 2024-07-04 ASSESSMENT — ACTIVITIES OF DAILY LIVING (ADL)
ADLS_ACUITY_SCORE: 47
ADLS_ACUITY_SCORE: 46
ADLS_ACUITY_SCORE: 47
ADLS_ACUITY_SCORE: 47
ADLS_ACUITY_SCORE: 46
ADLS_ACUITY_SCORE: 47
ADLS_ACUITY_SCORE: 47
ADLS_ACUITY_SCORE: 46
ADLS_ACUITY_SCORE: 47
ADLS_ACUITY_SCORE: 46
ADLS_ACUITY_SCORE: 47
ADLS_ACUITY_SCORE: 46
ADLS_ACUITY_SCORE: 46
ADLS_ACUITY_SCORE: 47
ADLS_ACUITY_SCORE: 47
ADLS_ACUITY_SCORE: 46
ADLS_ACUITY_SCORE: 47
ADLS_ACUITY_SCORE: 46
ADLS_ACUITY_SCORE: 47

## 2024-07-04 NOTE — PLAN OF CARE
Diagnosis: pleural effusion, LIMA  POD#: 8 bronchoscopy  Mental Status: A&Ox4  Activity/dangle up 1 GB wheelchair  Diet: gluten free, 2gm NA diet  Pain: Po dilaudid, tylenol  Alvarez/Voiding: purewick, incontinent  02/LDA: 1L NC. PICC RUE  D/C Date: pending  Other Info: stump dressing CDI. Edema +2.+3 L foot ankle. Meplex coccyx

## 2024-07-04 NOTE — PROGRESS NOTES
[facility]    Hospitalist Progress Note    Interval History   Assumed care.  No complaints, eating, bowels moving.  Right AKA, nonambulatory.  Denies dyspnea, cough, chest pain.    Assessment & Plan   Summary: Shirley Hendricks is a 63 year old female with PMH CAD, MI, peripheral vascular disease status angioplasty and right AKA with wound vac, DLE, COPD, CMT disease, tobacco use, CKD stage III, GERD, hypertension, history of B-cell lymphoma, hyperlipidemia, depression, anxiety, who was admitted on 6/21/2024 with acute hypoxic respiratory failure due to a large left pleural effusion, hyperkalemia, and hyponatremia.   Patient recently admitted to [facility] 3/29-4/22/24 with right limb ischemia in setting of severe PAD ultimately requiring right AKA with complicated post op course (including LIMA requiring HD) after which she was discharged to  [facility]. She was readmitted to [facility] 5/15-5/29/24 with stump eschars requiring surgical debridement and wound VAC placement.   This admission, patient underwent left thoracentesis with 800mL and 1200mL fluid removed. Severe hyperkalemia improved with Lokelma. Patient had been anuric since admission, started on dialysis on 6/23 now with some renal recovery. Chest tube placed 6/23, CT on 6/24 shows ongoing left bronchus obstruction, felt to be mucous plugging per Pulm. Improved after dialysis 6/24, now making urine, O2 needs down to 2-3L.   S/p bronchoscopy 6/26 with mucous plugging removed. Chest tube to be removed 6/26. Lung re-expansion noted 6/26. Renal function stabilizing 6/28.     Left mainstem bronchial obstruction due to mucous plugging s/p bronchoscopy 6/26/2024  Acute hypoxic respiratory failure due to above, improved  Large left pleural effusion, transudative  S/p left thoracentesis 800mL 6/21, 1200mL 6/22  S/p left chest tube 6/23/2024  Right moderate pleural effusion  Patient with increasing shortness of breath for 1-2 weeks PTA.  "CXR 6/21 in ED reveals complete opacification of the left hemithorax. CT chest-large left pleural effusion with complete collapse of the left upper and left lower and occlusion of left mainstem bronchus and a moderate size right pleural effusion. Patient underwent thoracentesis 800mL on 6/21, fluid appears transudative. Patient appears markedly volume overloaded. In the ED she initially required 2L O2 but was transitioned to HFNC overnight 6/22 to 6/23 during RRT.  Reports symptomatic improvement with thoracentesis 6/21 and 6/22, but continues to require 10+L O2 or HFNC on 6/23. Discussed with Pulm on 6/23, concern remains left mainstem bronchus obstruction but need to rule out recurrent pleural effusion as cause, so chest tube placed on 6/23. CT chest on 6/24 shows persistent persistent left bronchial obstruction, per Pulm likely mucous plugging.  Bronchoscopy done 6/26 with aspiration of mucous plugs. Chest tube removed 6/26. Patient with chest pain due to lung re-expansion 6/26, CXR 6/27 shows improving lung expansion.   It appears that patient's major turnaround started following bronchoscopy on 6/26.    Breathing continues to improve on 6/30. Weaning oxygen.  resp cx with normal nadine.  No fungal elements seen on KOH.  - Pleural fluid cytology: 7/3/2024.  In Epic, cytology \"negative for malignancy, no fungal or pneumocystis organisms viral cystopathic effect negative.\"  - Pulmonology consult  Currently denies dyspnea, hypoxia, cough, sputum.,  On room air this afternoon.      Anuric renal failure on CKD, requiring brief dialysis, improved  Recent acute renal failure in May 2024 needing hemodialysis  Severe hyperkalemia, improved  Hyponatremia  Metabolic acidosis  Recent baseline creatinine has been fluctuating between 1.9-2.8. On admission Creatinine is 3.07, potassium is 7.5, sodium of 128 with bicarb of 17.   In the ER patient did receive albuterol, 2 g calcium gluconate, insulin, Kayexalate 10 g and 40 IV " "Lasix and 1 L IV fluid bolus. EKG showed sinus bradycardia with prolonged QT. Potassium has been elevated to 7.1 since admission, improved to 5.3 on 6/23 with Lokelma.  Patient has been anuric since admission, no urine output. Etiology of anuric renal failure likely related to left mainstem bronchus obstruction. Due to ongoing anuria, TDC placed 6/23 and dialyzed 6/23 and 6/24.  Started to making urine 6/25, creatinine has stabilized on 6/28 and now fluctuating 2.7-3  Had received HD.  - Nephrology consult.  Appreciate input, see note 7/2/2024 received IV albumin  CR stable to improved.  Hemoglobin increased likely suggesting the past was delusional.  No signs of active bleeding.  Another dose of albumin t given continue IV Bumex, follow clinically including BMP, may be able to pull HD catheter if continued improvement.\"  -7/3/2024 see nephrology note, discussed with Dr. Falcon.  In summary, HD catheter pulled per IR, continue IV Bumex plus albumin until plateaus then changed to oral Bumex.  Diet liberalized no potassium or phosphate restrictions, continue current diet of sodium restriction and gluten free.  7/4/2024, seen nephrology note, Dr. Truong.  Now on oral Bumex.  HD catheter out.      Possible acute diastolic congestive heart failure exacerbation  Elevated troponin suspect due to volume overload  Patient presents with large left pleural effusion, BNP  elevated to 11.9k. Note history of stress cardiomyopathy in Oct 2023 with EF 30-35% which then resolved on echo Nov 2023.    Patient does not have any significant chest discomfort and troponin are 174 and 177 and trending down to 156. Echo 6/22 shows Ef 60-65%, mid anterior and lateral severe hypokinessis, distal inferior hypokinesis, normal RV function. Suspect large component of renal failure contributing. Felt less likely to be CHF as this was likely due to renal failure.   - Cardiology consult   - Nephrology, see above  - Tele, weights, I&O  - Diuresis per " nephro/cards.    Systolic murmur: Noted systolic murmur 6/23, limited echo 6/24 shows no change from 6/22, likely flow murmur. Murmur improved 6/28.    Anemia of chronic disease  Acute anemia 6/23 likely spurious  Recent baseline hemoglobin has been fluctuating and has been between 8.4-10.1.   Hemoglobin 8.4 on 6/22-->6.4 on 6/23. Patient denies blood loss. Bilirubin is not elevated makes hemolysis less likely. Patient was given 1u pRBC, recheck Hgb was 9.5 via skin draw. The Hgb of 6.4 likely was spurious related to an inaccurate PICC line draw, likely Hgb was 8.5-->9.5 with 1u pRBC.  - [facility] consult appreciated, signed off  - Daily Hgb checks, see above    Hypertension  Hyperlipidemia  History of coronary disease with an MI in 2019  Left heart cath on 10/4/2023 showed-completely occluded D1 with left to left collaterals from distal LAD to D1, moderate CAD involving proximal to mid RCA not significant by IFR and moderate coronary disease please involving distal small caliber RPL and distal circumflex artery. Last echo 11/23 showed EF of 55 to 60% with normal RV and moderate trileaflet aortic sclerosis  - Resume PTA Coreg 6/28  - Continue amlodipine 10mg daily  -Prior hospitalist held patient's rosuvastatin, unclear reason.  Discussed with patient who denies myalgia, normal LFTs.  In patient with severe PAD restart rosuvastatin, close monitor.  LFTs in a.m. 7/4/2024, no myalgias, LFTs WNL.    History of peripheral vascular disease status above-knee amputation on the right with wound dressing status washout on 5/24  Neuropathic pain  Patient has followed with vascular surgery and has noticed during last hospitalization in May 2024 she underwent washout with wound VAC placement and was discharged on Plavix and Crestor. On exam the amputation stump is currently wrapped, no obvious discharge from what I can see on exam   - Continue with PTA Plavix 75 mg daily  - wound vac now removed, vascular surgery consult  appreciated  - Pain control with hydromorphone oral PRN (avoid oxycodone as it is renally cleared)  -See above related to rosuvastatin.    COPD not in exacerbation  - On exam has no wheezing and continue with as needed symbicort    GERD: Famotidine BID    History of B-cell lymphoma  - Follows with [place] [facility]  - Pending cytology from thoracentesis     Tobacco use  - Continue with nicotine patch    Ongoing stressors  Patient had a recent AKA in 2024 with complicated post op course 2024, and reports   2024.    Clinically Significant Risk Factors         # Hyponatremia: Lowest Na = 128 mmol/L in last 2 days, will monitor as appropriate      # Hypoalbuminemia: Lowest albumin = 2.1 g/dL at 2024  6:21 AM, will monitor as appropriate   # Thrombocytopenia: Lowest platelets = 123 in last 2 days, will monitor for bleeding   # Hypertension: Noted on problem list             #Precipitous drop in Hgb/Hct: Lowest Hgb this hospitalization: 6.4 g/dL. Will continue to monitor and treat/transfuse as appropriate.      # Moderate Malnutrition: based on nutrition assessment      # Financial/Environmental Concerns: none          PT/OT: ordered  Diet: Snacks/Supplements Adult: Expedite Bottle; Between Meals  Combination Diet Gluten Free Diet; 2 gm NA Diet    DVT Prophylaxis: none.  Encourage patient out of bed, up and sitting with meals.  Alvarez Catheter: Not present  Lines: PRESENT      PICC 24 Double Lumen Left Brachial vein medial access-Site Assessment: WDL    Cardiac Monitoring: None  Code Status: Full Code    Medically Ready for Discharge: >2days pending Bethanie AMATO MD  Hospitalist Service  [facility]      Data reviewed today: I reviewed all new labs and imaging results over the last 24 hours.    Physical Exam   Temp: 98.1  F (36.7  C) Temp src: Oral BP: (!) 167/113 Pulse: 61   Resp: 16 SpO2: 95 % O2 Device: Nasal cannula Oxygen Delivery: 1  [facility]  Vitals:    07/01/24 0554 07/02/24 0600 07/03/24 0650   Weight: 58.9 kg (129 lb 13.6 oz) 55.3 kg (121 lb 14.6 oz) 52.3 kg (115 lb 4.8 oz)     General/Constitutional:   NAD, alert, calm, cooperative  Chest/Respiratory: Respirations nonlabored room air  Cardiovascular:  regular, no murmur appreciated.  LE edema none status post right AKA  Gastrointestinal/Abdomen:  soft, nontender, PureWick present, clear yellow urine.    Neuro.  Gross motor tested, nonfocal, R AKA  Psych oriented, affect: Narrow range    Medications   Current Facility-Administered Medications   Medication Dose Route Frequency Provider Last Rate Last Admin    No lozenges or gum should be given while patient on BIPAP/AVAPS/AVAPS AE   Does not apply Continuous PRN Devin Ravi MD        Patient may continue current oral medications   Does not apply Continuous PRN Devin Ravi MD         Current Facility-Administered Medications   Medication Dose Route Frequency Provider Last Rate Last Admin    - MEDICATION INSTRUCTIONS for Dialysis Patients -   Does not apply See Admin Instructions Devin Ravi MD        amLODIPine (NORVASC) tablet 10 mg  10 mg Oral Daily Devin Ravi MD   10 mg at 07/04/24 0914    bumetanide (BUMEX) tablet 2 mg  2 mg Oral TID Hardy Falcon MD   2 mg at 07/04/24 1129    calcium acetate (PHOSLO) capsule 667 mg  667 mg Oral TID w/meals Hardy Falcon MD   667 mg at 07/04/24 1129    carvedilol (COREG) tablet 12.5 mg  12.5 mg Oral BID w/meals Devin Ravi MD   12.5 mg at 07/04/24 0913    clopidogrel (PLAVIX) tablet 75 mg  75 mg Oral Daily Devin Ravi MD   75 mg at 07/04/24 0914    famotidine (PEPCID) tablet 10 mg  10 mg Oral Daily Devin Ravi MD   10 mg at 07/04/24 0914    fluticasone-vilanterol (BREO ELLIPTA) 200-25 MCG/ACT inhaler 1 puff  1 puff Inhalation Daily Bruce Ulloa MD   1 puff at 07/03/24 1003    ipratropium - albuterol 0.5 mg/2.5 mg/3 mL (DUONEB) neb  solution 3 mL  3 mL Nebulization 4x daily Rod Nath MD   3 mL at 07/04/24 1048    nicotine (NICODERM CQ) 14 MG/24HR 24 hr patch 1 patch  1 patch Transdermal Daily Bruce Ulloa MD   1 patch at 07/04/24 0912    polyethylene glycol (MIRALAX) Packet 17 g  17 g Oral Daily Bruce Ulloa MD   17 g at 07/03/24 1001    rosuvastatin (CRESTOR) tablet 10 mg  10 mg Oral At Bedtime Bethanie Staley MD   10 mg at 07/03/24 2102    saccharomyces boulardii (FLORASTOR) capsule 250 mg  250 mg Oral BID Bruce Ulloa MD   250 mg at 07/04/24 0913    sodium chloride (PF) 0.9% PF flush 10 mL  10 mL Intracatheter Q8H Bethanie Staley MD   10 mL at 07/03/24 2104       Data   Recent Labs   Lab 07/04/24  0621 07/03/24  0556 07/02/24  0603   WBC 4.5 4.3 4.2   HGB 8.0* 8.6* 9.1*   MCV 93 92 92   * 126* 117*   * 128* 126*   POTASSIUM 4.8 4.6 5.1   CHLORIDE 93* 93* 91*   CO2 28 26 27   BUN 61.1* 60.0* 60.4*   CR 2.23* 2.28* 2.48*   ANIONGAP 10 9 8   SONIA 7.9* 7.8* 7.8*   GLC 74 73 76   ALBUMIN 2.5* 2.7* 2.6*   PROTTOTAL 4.7*  --   --    BILITOTAL <0.2  --   --    ALKPHOS 51  --   --    ALT 15  --   --    AST 13  --   --        Imaging:   No results found for this or any previous visit (from the past 24 hour(s)).

## 2024-07-04 NOTE — PROGRESS NOTES
Renal Medicine Progress Note            Assessment/Plan:     63 y.o woman with;    # Severe A/CKD: Improved and fortunately, she is able to come off dialysis.  Scr was ~ 0.7-0.8 mg/dl in March.   # FEN: Hyperphosphatemia. Hyponatremia improving.   -Phoslo  # HTN:    -Norvasc 10 mg daily   -Coreg 12.5 mg bid  # Anemia: Hgb is below target.   # Normal LV/RV function.     Plan:  # Oral Bumex as ordered by Dr. Falcon.  # Cont to monitor kidney function  # Add iron study  # I discussed fluid and Na restriction with her          Interval History:     Afebrile. VSS  II/O: 200/600 if accurate, doubt it is.  Wt is down 3 kg?  Bumex to oral  TDC removed by IR yesterday.  Kidney function is stable.     Family and friends are at the bedside. They asked great questions, which I answered.            Medications and Allergies:     Current Facility-Administered Medications   Medication Dose Route Frequency Provider Last Rate Last Admin    - MEDICATION INSTRUCTIONS for Dialysis Patients -   Does not apply See Admin Instructions Devin Ravi MD        amLODIPine (NORVASC) tablet 10 mg  10 mg Oral Daily Devin Ravi MD   10 mg at 07/04/24 0914    bumetanide (BUMEX) tablet 2 mg  2 mg Oral TID Hardy Falcon MD   2 mg at 07/04/24 1129    calcium acetate (PHOSLO) capsule 667 mg  667 mg Oral TID w/meals Hardy Falcon MD   667 mg at 07/04/24 1129    carvedilol (COREG) tablet 12.5 mg  12.5 mg Oral BID w/meals Devin Ravi MD   12.5 mg at 07/04/24 0913    clopidogrel (PLAVIX) tablet 75 mg  75 mg Oral Daily Devin Ravi MD   75 mg at 07/04/24 0914    famotidine (PEPCID) tablet 10 mg  10 mg Oral Daily Devin Ravi MD   10 mg at 07/04/24 0914    fluticasone-vilanterol (BREO ELLIPTA) 200-25 MCG/ACT inhaler 1 puff  1 puff Inhalation Daily Bruce Ulloa MD   1 puff at 07/03/24 1003    ipratropium - albuterol 0.5 mg/2.5 mg/3 mL (DUONEB) neb solution 3 mL  3 mL Nebulization 4x daily Rod Nath MD    3 mL at 07/04/24 1048    nicotine (NICODERM CQ) 14 MG/24HR 24 hr patch 1 patch  1 patch Transdermal Daily Bruce Ulloa MD   1 patch at 07/04/24 0912    polyethylene glycol (MIRALAX) Packet 17 g  17 g Oral Daily Bruce Ulloa MD   17 g at 07/03/24 1001    rosuvastatin (CRESTOR) tablet 10 mg  10 mg Oral At Bedtime Bethanie Staley MD   10 mg at 07/03/24 2102    saccharomyces boulardii (FLORASTOR) capsule 250 mg  250 mg Oral BID Bruce Ulloa MD   250 mg at 07/04/24 0913    sodium chloride (PF) 0.9% PF flush 10 mL  10 mL Intracatheter Q8H Bethanie Staley MD   10 mL at 07/03/24 2104        Allergies   Allergen Reactions    Contrast Dye Anaphylaxis     Pt reported facial and throat swelling with prior CT contrast    Pantoprazole      Protonix caused diffuse edema    Chantix [Varenicline]      Terrible dreams    Gluten Meal GI Disturbance     Pt has celiac disease    Penicillins Itching and Rash            Physical Exam:   Vitals were reviewed   , Blood pressure (!) 167/113, pulse 61, temperature 98.1  F (36.7  C), temperature source Oral, resp. rate 16, weight 52.3 kg (115 lb 4.8 oz), SpO2 95%, not currently breastfeeding.    Wt Readings from Last 3 Encounters:   07/03/24 52.3 kg (115 lb 4.8 oz)   06/05/24 60.3 kg (132 lb 15 oz)   05/29/24 62.3 kg (137 lb 5.6 oz)       Intake/Output Summary (Last 24 hours) at 7/4/2024 1224  Last data filed at 7/4/2024 0650  Gross per 24 hour   Intake --   Output 600 ml   Net -600 ml       GENERAL APPEARANCE: pleasant, NAD, alert  HEENT:  Eyes/ears/nose/neck grossly normal  RESP: No wheezes or crackles.  CV: RRR, + murmur  ABDOMEN: o/s/nt/nd, bs present  EXTREMITIES/SKIN: trace edema only  NEURO: Aake, alert and conversing appropriately         Data:     CBC RESULTS:     Recent Labs   Lab 07/04/24  0621 07/03/24  0556 07/02/24  0603 07/01/24  0609 06/30/24  0629 06/29/24  0621   WBC 4.5 4.3 4.2 3.7* 4.0 4.8   RBC 2.67* 2.90* 2.97* 2.67* 2.67* 2.71*   HGB 8.0* 8.6* 9.1* 7.9*  8.3* 8.4*   HCT 24.9* 26.7* 27.4* 24.3* 24.7* 25.5*   * 126* 117* 115* 132* 145*       Basic Metabolic Panel:  Recent Labs   Lab 07/04/24  0621 07/03/24  0556 07/02/24  0603 07/01/24  0609 06/30/24  0629 06/29/24  0621   * 128* 126* 125* 126* 126*   POTASSIUM 4.8 4.6 5.1 4.9 4.6 4.6   CHLORIDE 93* 93* 91* 91* 91* 91*   CO2 28 26 27 27 26 26   BUN 61.1* 60.0* 60.4* 59.4* 52.4* 49.8*   CR 2.23* 2.28* 2.48* 2.62* 2.68* 2.97*   GLC 74 73 76 72 76 76   SONIA 7.9* 7.8* 7.8* 7.4* 7.6* 7.4*       INRNo lab results found in last 7 days.   Attestation:   I have reviewed today's relevant vital signs, notes, medications, labs and imaging.    Torrey Alegria MD  [facility] - Nephrology  Office phone :[phone]  Pager: [phone]

## 2024-07-04 NOTE — PLAN OF CARE
Goal Outcome Evaluation:       Pt A&Ox4. VSS on 1L via NC ex HTN. 2 grm Na diet gluten free. Up 1A w/ GB pivot to personal WC. Voiding in purewick. L AKA dressing CDI, L sacral wound dressing changed this shift, CDI. Pain controlled w/ PRN Tylenol and PO Dilaudid. LUE PICC SL w/ good blood return. Nephrology following. Discharge home w/ [facility] pending medical stability, CC following. Continue plan of care.

## 2024-07-05 LAB
ALBUMIN SERPL BCG-MCNC: 2.6 G/DL (ref 3.5–5.2)
ANION GAP SERPL CALCULATED.3IONS-SCNC: 9 MMOL/L (ref 7–15)
BUN SERPL-MCNC: 61 MG/DL (ref 8–23)
CALCIUM SERPL-MCNC: 8 MG/DL (ref 8.8–10.2)
CHLORIDE SERPL-SCNC: 91 MMOL/L (ref 98–107)
CREAT SERPL-MCNC: 2.09 MG/DL (ref 0.51–0.95)
DEPRECATED HCO3 PLAS-SCNC: 29 MMOL/L (ref 22–29)
EGFRCR SERPLBLD CKD-EPI 2021: 26 ML/MIN/1.73M2
ERYTHROCYTE [DISTWIDTH] IN BLOOD BY AUTOMATED COUNT: 13.7 % (ref 10–15)
GLUCOSE SERPL-MCNC: 73 MG/DL (ref 70–99)
HCT VFR BLD AUTO: 25.8 % (ref 35–47)
HGB BLD-MCNC: 8 G/DL (ref 11.7–15.7)
MCH RBC QN AUTO: 29 PG (ref 26.5–33)
MCHC RBC AUTO-ENTMCNC: 31 G/DL (ref 31.5–36.5)
MCV RBC AUTO: 94 FL (ref 78–100)
PHOSPHATE SERPL-MCNC: 5.1 MG/DL (ref 2.5–4.5)
PLATELET # BLD AUTO: 144 10E3/UL (ref 150–450)
POTASSIUM SERPL-SCNC: 4.3 MMOL/L (ref 3.4–5.3)
RBC # BLD AUTO: 2.76 10E6/UL (ref 3.8–5.2)
SODIUM SERPL-SCNC: 129 MMOL/L (ref 135–145)
WBC # BLD AUTO: 4.5 10E3/UL (ref 4–11)

## 2024-07-05 PROCEDURE — 250N000013 HC RX MED GY IP 250 OP 250 PS 637: Performed by: INTERNAL MEDICINE

## 2024-07-05 PROCEDURE — 85014 HEMATOCRIT: CPT | Performed by: INTERNAL MEDICINE

## 2024-07-05 PROCEDURE — 80069 RENAL FUNCTION PANEL: CPT | Performed by: INTERNAL MEDICINE

## 2024-07-05 PROCEDURE — 99232 SBSQ HOSP IP/OBS MODERATE 35: CPT | Performed by: INTERNAL MEDICINE

## 2024-07-05 PROCEDURE — 120N000001 HC R&B MED SURG/OB

## 2024-07-05 PROCEDURE — 250N000013 HC RX MED GY IP 250 OP 250 PS 637: Performed by: HOSPITALIST

## 2024-07-05 PROCEDURE — 999N000157 HC STATISTIC RCP TIME EA 10 MIN

## 2024-07-05 PROCEDURE — 99232 SBSQ HOSP IP/OBS MODERATE 35: CPT | Performed by: HOSPITALIST

## 2024-07-05 RX ADMIN — HYDROMORPHONE HYDROCHLORIDE 2 MG: 2 TABLET ORAL at 10:21

## 2024-07-05 RX ADMIN — NICOTINE 1 PATCH: 14 PATCH, EXTENDED RELEASE TRANSDERMAL at 10:09

## 2024-07-05 RX ADMIN — POLYETHYLENE GLYCOL 3350 17 G: 17 POWDER, FOR SOLUTION ORAL at 10:09

## 2024-07-05 RX ADMIN — Medication 250 MG: at 10:08

## 2024-07-05 RX ADMIN — ROSUVASTATIN CALCIUM 10 MG: 10 TABLET, FILM COATED ORAL at 21:52

## 2024-07-05 RX ADMIN — CARVEDILOL 12.5 MG: 12.5 TABLET, FILM COATED ORAL at 18:17

## 2024-07-05 RX ADMIN — AMLODIPINE BESYLATE 10 MG: 10 TABLET ORAL at 10:07

## 2024-07-05 RX ADMIN — BUMETANIDE 2 MG: 1 TABLET ORAL at 13:16

## 2024-07-05 RX ADMIN — ACETAMINOPHEN 650 MG: 325 TABLET, FILM COATED ORAL at 18:24

## 2024-07-05 RX ADMIN — HYDROMORPHONE HYDROCHLORIDE 2 MG: 2 TABLET ORAL at 18:24

## 2024-07-05 RX ADMIN — CALCIUM ACETATE 667 MG: 667 CAPSULE ORAL at 10:07

## 2024-07-05 RX ADMIN — FLUTICASONE FUROATE AND VILANTEROL TRIFENATATE 1 PUFF: 200; 25 POWDER RESPIRATORY (INHALATION) at 10:14

## 2024-07-05 RX ADMIN — CLOPIDOGREL BISULFATE 75 MG: 75 TABLET ORAL at 10:08

## 2024-07-05 RX ADMIN — CALCIUM ACETATE 667 MG: 667 CAPSULE ORAL at 18:17

## 2024-07-05 RX ADMIN — FAMOTIDINE 10 MG: 10 TABLET, FILM COATED ORAL at 10:07

## 2024-07-05 RX ADMIN — CARVEDILOL 12.5 MG: 12.5 TABLET, FILM COATED ORAL at 10:08

## 2024-07-05 RX ADMIN — CALCIUM ACETATE 667 MG: 667 CAPSULE ORAL at 14:27

## 2024-07-05 RX ADMIN — BUMETANIDE 2 MG: 1 TABLET ORAL at 10:08

## 2024-07-05 RX ADMIN — ACETAMINOPHEN 650 MG: 325 TABLET, FILM COATED ORAL at 10:21

## 2024-07-05 RX ADMIN — BUMETANIDE 2 MG: 1 TABLET ORAL at 18:17

## 2024-07-05 RX ADMIN — HYDROMORPHONE HYDROCHLORIDE 2 MG: 2 TABLET ORAL at 21:58

## 2024-07-05 ASSESSMENT — ACTIVITIES OF DAILY LIVING (ADL)
ADLS_ACUITY_SCORE: 47
ADLS_ACUITY_SCORE: 51
ADLS_ACUITY_SCORE: 51
ADLS_ACUITY_SCORE: 47
ADLS_ACUITY_SCORE: 46
ADLS_ACUITY_SCORE: 46
ADLS_ACUITY_SCORE: 47
ADLS_ACUITY_SCORE: 46
ADLS_ACUITY_SCORE: 47
ADLS_ACUITY_SCORE: 51
ADLS_ACUITY_SCORE: 46
ADLS_ACUITY_SCORE: 47

## 2024-07-05 NOTE — PROGRESS NOTES
[facility]    Hospitalist Progress Note    Interval History   Reviewed with patient her home situation.  She lives with brother and son, sister will stay with her on discharge.  Mobility per slide board to wheelchair, she is AKA.  Receives home care/wound care 3 times per week.  She states she was started on OT/PT.  She is followed by PCP [facility] and will start nephrology follow-up on discharge.    Assessment & Plan   Summary: Shirley Hendricks is a 63 year old female with PMH CAD, MI, peripheral vascular disease status angioplasty and right AKA with wound vac, DLE, COPD, CMT disease, tobacco use, CKD stage III, GERD, hypertension, history of B-cell lymphoma, hyperlipidemia, depression, anxiety, who was admitted on 6/21/2024 with acute hypoxic respiratory failure due to a large left pleural effusion, hyperkalemia, and hyponatremia.   Patient recently admitted to [facility] 3/29-4/22/24 with right limb ischemia in setting of severe PAD ultimately requiring right AKA with complicated post op course (including LIMA requiring HD) after which she was discharged to  [facility]. She was readmitted to [facility] 5/15-5/29/24 with stump eschars requiring surgical debridement and wound VAC placement.   This admission, patient underwent left thoracentesis with 800mL and 1200mL fluid removed. Severe hyperkalemia improved with Lokelma. Patient had been anuric since admission, started on dialysis on 6/23 now with some renal recovery. Chest tube placed 6/23, CT on 6/24 shows ongoing left bronchus obstruction, felt to be mucous plugging per Pulm. Improved after dialysis 6/24, now making urine, O2 needs down to 2-3L.   S/p bronchoscopy 6/26 with mucous plugging removed. Chest tube to be removed 6/26. Lung re-expansion noted 6/26. Renal function stabilizing 6/28.     Left mainstem bronchial obstruction due to mucous plugging s/p bronchoscopy 6/26/2024  Acute hypoxic respiratory failure due to above,  "improved  Large left pleural effusion, transudative  S/p left thoracentesis 800mL 6/21, 1200mL 6/22  S/p left chest tube 6/23/2024  Right moderate pleural effusion  Patient with increasing shortness of breath for 1-2 weeks PTA. CXR 6/21 in ED reveals complete opacification of the left hemithorax. CT chest-large left pleural effusion with complete collapse of the left upper and left lower and occlusion of left mainstem bronchus and a moderate size right pleural effusion. Patient underwent thoracentesis 800mL on 6/21, fluid appears transudative. Patient appears markedly volume overloaded. In the ED she initially required 2L O2 but was transitioned to HFNC overnight 6/22 to 6/23 during RRT.  Reports symptomatic improvement with thoracentesis 6/21 and 6/22, but continues to require 10+L O2 or HFNC on 6/23. Discussed with Pulm on 6/23, concern remains left mainstem bronchus obstruction but need to rule out recurrent pleural effusion as cause, so chest tube placed on 6/23. CT chest on 6/24 shows persistent persistent left bronchial obstruction, per Pulm likely mucous plugging.  Bronchoscopy done 6/26 with aspiration of mucous plugs. Chest tube removed 6/26. Patient with chest pain due to lung re-expansion 6/26, CXR 6/27 shows improving lung expansion.   It appears that patient's major turnaround started following bronchoscopy on 6/26.    Breathing continues to improve on 6/30. Weaning oxygen.  resp cx with normal nadine.  No fungal elements seen on KOH.  - Pleural fluid cytology: 7/3/2024.  In Epic, cytology \"negative for malignancy, no fungal or pneumocystis organisms viral cystopathic effect negative.\"  - Pulmonology consult  Currently denies dyspnea, hypoxia, cough, sputum.,  On room air.  Minimal activity out of bed, encourage p.o.      Anuric renal failure on CKD, requiring brief dialysis, improved  Recent acute renal failure in May 2024 needing hemodialysis  Severe hyperkalemia, improved  Hyponatremia  Metabolic " "acidosis  Recent baseline creatinine has been fluctuating between 1.9-2.8. On admission Creatinine is 3.07, potassium is 7.5, sodium of 128 with bicarb of 17.   In the ER patient did receive albuterol, 2 g calcium gluconate, insulin, Kayexalate 10 g and 40 IV Lasix and 1 L IV fluid bolus. EKG showed sinus bradycardia with prolonged QT. Potassium has been elevated to 7.1 since admission, improved to 5.3 on 6/23 with Lokelma.  Patient has been anuric since admission, no urine output. Etiology of anuric renal failure likely related to left mainstem bronchus obstruction. Due to ongoing anuria, TDC placed 6/23 and dialyzed 6/23 and 6/24.  Started to making urine 6/25, creatinine has stabilized on 6/28 and now fluctuating 2.7-3  Had received HD.  - Nephrology consult.  Appreciate input, see note 7/2/2024 received IV albumin  CR stable to improved.  Hemoglobin increased likely suggesting the past was delusional.  No signs of active bleeding.  Another dose of albumin t given continue IV Bumex, follow clinically including BMP, may be able to pull HD catheter if continued improvement.\"  -7/3/2024 see nephrology note, discussed with Dr. Falcon.  In summary, HD catheter pulled per IR, continue IV Bumex plus albumin until plateaus then changed to oral Bumex.  Diet liberalized no potassium or phosphate restrictions, continue current diet of sodium restriction and gluten free.  7/4/2024, seen nephrology note, Dr. Truong.  Now on oral Bumex.  HD catheter out.  7/5/2024 discussed with Dr. Falcon, nephrology.  Concern of rising CO2, may be the due to diuresis.  Continue to follow CO2 and if increased contraction alkalosis decrease Bumex to 1 mg 3 times daily currently 2 mg twice daily.  If progressive hyponatremia may need further increase fluid restrictions.      Diastolic congestive heart failure exacerbation  Elevated troponin suspect due to volume overload  Patient presents with large left pleural effusion, BNP  elevated to 11.9k. Note " history of stress cardiomyopathy in Oct 2023 with EF 30-35% which then resolved on echo Nov 2023.    Patient does not have any significant chest discomfort and troponin are 174 and 177 and trending down to 156. Echo 6/22 shows Ef 60-65%, mid anterior and lateral severe hypokinessis, distal inferior hypokinesis, normal RV function. Suspect large component of renal failure contributing. Felt less likely to be CHF as this was likely due to renal failure.   - Cardiology consult   - Nephrology, see above  - Tele, weights, I&O  - Diuresis per nephro/cards.    Systolic murmur: Noted systolic murmur 6/23, limited echo 6/24 shows no change from 6/22, likely flow murmur. Murmur improved 6/28.    Anemia of chronic disease  Acute anemia 6/23 likely spurious  Recent baseline hemoglobin has been fluctuating and has been between 8.4-10.1.   Hemoglobin 8.4 on 6/22-->6.4 on 6/23. Patient denies blood loss. Bilirubin is not elevated makes hemolysis less likely. Patient was given 1u pRBC, recheck Hgb was 9.5 via skin draw. The Hgb of 6.4 likely was spurious related to an inaccurate PICC line draw, likely Hgb was 8.5-->9.5 with 1u pRBC.  - [facility] consult appreciated, signed off  - Daily Hgb checks, see above    Hypertension  Hyperlipidemia  History of coronary disease with an MI in 2019  Left heart cath on 10/4/2023 showed-completely occluded D1 with left to left collaterals from distal LAD to D1, moderate CAD involving proximal to mid RCA not significant by IFR and moderate coronary disease please involving distal small caliber RPL and distal circumflex artery. Last echo 11/23 showed EF of 55 to 60% with normal RV and moderate trileaflet aortic sclerosis  - Resume PTA Coreg 6/28  - Continue amlodipine 10mg daily  -Prior hospitalist held patient's rosuvastatin, unclear reason.  Discussed with patient who denies myalgia, normal LFTs.  In patient with severe PAD restart rosuvastatin, close monitor.  LFTs in a.m. 7/4/2024, no myalgias,  LFTs WNL.    History of peripheral vascular disease status above-knee amputation on the right with wound dressing status washout on   Neuropathic pain  Patient has followed with vascular surgery and has noticed during last hospitalization in May 2024 she underwent washout with wound VAC placement and was discharged on Plavix and Crestor. On exam the amputation stump is currently wrapped, no obvious discharge from what I can see on exam   - Continue with PTA Plavix 75 mg daily  - wound vac now removed, vascular surgery consult appreciated  - Pain control with hydromorphone oral PRN (avoid oxycodone as it is renally cleared)  -See above related to rosuvastatin.    COPD not in exacerbation  - On exam has no wheezing and continue with as needed symbicort    GERD: Famotidine BID    History of B-cell lymphoma  - Follows with [place] [facility]  - Pending cytology from thoracentesis     Tobacco use  - Continue with nicotine patch    Ongoing stressors  Patient had a recent AKA in 2024 with complicated post op course 2024, and reports   2024.    Clinically Significant Risk Factors         # Hyponatremia: Lowest Na = 129 mmol/L in last 2 days, will monitor as appropriate      # Hypoalbuminemia: Lowest albumin = 2.1 g/dL at 2024  6:21 AM, will monitor as appropriate   # Thrombocytopenia: Lowest platelets = 123 in last 2 days, will monitor for bleeding   # Hypertension: Noted on problem list             #Precipitous drop in Hgb/Hct: Lowest Hgb this hospitalization: 6.4 g/dL. Will continue to monitor and treat/transfuse as appropriate.      # Moderate Malnutrition: based on nutrition assessment      # Financial/Environmental Concerns: none          PT/OT: ordered  Diet: Snacks/Supplements Adult: Expedite Bottle; Between Meals  Combination Diet Gluten Free Diet; 2 gm NA Diet    DVT Prophylaxis: none.  Encourage patient out of bed, up and sitting with meals.  Alvarez Catheter: Not  present  Lines: PRESENT      PICC 06/22/24 Double Lumen Left Brachial vein medial access-Site Assessment: WDL    Cardiac Monitoring: None  Code Status: Full Code    Medically Ready for Discharge: >2days pending LIMA,     Bethanie Staley MD  Hospitalist Service  [facility]    I spent 35 minutes total time in management of care today 7/5/2024 reviewing labs, medications, interdisciplinary notes; and completing documentation of encounter and orders with Care Management including counseling/discussion withthe Patient regarding home situation and support and Coordinating Care and plan with Nursing and Specialists, Nephrology regarding  management and surveillance     Data reviewed today: I reviewed all new labs and imaging results over the last 24 hours.    Physical Exam   Temp: 98.2  F (36.8  C) Temp src: Oral BP: (!) 174/71 Pulse: 68   Resp: 16 SpO2: 100 % O2 Device: Nasal cannula Oxygen Delivery: 1 LPM  Vitals:    07/01/24 0554 07/02/24 0600 07/03/24 0650   Weight: 58.9 kg (129 lb 13.6 oz) 55.3 kg (121 lb 14.6 oz) 52.3 kg (115 lb 4.8 oz)     General/Constitutional:   NAD alert, calm, cooperative  Chest/Respiratory: Respirations nonlabored room air  Cardiovascular:  regular, no murmur appreciated.  LE edema none status post right AKA  Gastrointestinal/Abdomen:  soft, nontender, PureWick present, clear yellow urine.    Neuro.  Gross motor tested, nonfocal, R AKA  Psych oriented, affect: Narrow range    Medications   Current Facility-Administered Medications   Medication Dose Route Frequency Provider Last Rate Last Admin    No lozenges or gum should be given while patient on BIPAP/AVAPS/AVAPS AE   Does not apply Continuous PRN Devin Ravi MD        Patient may continue current oral medications   Does not apply Continuous PRN Devin Ravi MD         Current Facility-Administered Medications   Medication Dose Route Frequency Provider Last Rate Last Admin    amLODIPine  (NORVASC) tablet 10 mg  10 mg Oral Daily Devin Rvai MD   10 mg at 07/05/24 1007    bumetanide (BUMEX) tablet 2 mg  2 mg Oral TID Hardy Falcon MD   2 mg at 07/05/24 1316    calcium acetate (PHOSLO) capsule 667 mg  667 mg Oral TID w/meals Hardy Falcon MD   667 mg at 07/05/24 1427    carvedilol (COREG) tablet 12.5 mg  12.5 mg Oral BID w/meals Devin Ravi MD   12.5 mg at 07/05/24 1008    clopidogrel (PLAVIX) tablet 75 mg  75 mg Oral Daily Devin Ravi MD   75 mg at 07/05/24 1008    famotidine (PEPCID) tablet 10 mg  10 mg Oral Daily Devin Ravi MD   10 mg at 07/05/24 1007    fluticasone-vilanterol (BREO ELLIPTA) 200-25 MCG/ACT inhaler 1 puff  1 puff Inhalation Daily Bruce Ulloa MD   1 puff at 07/05/24 1014    ipratropium - albuterol 0.5 mg/2.5 mg/3 mL (DUONEB) neb solution 3 mL  3 mL Nebulization 4x daily Rod Nath MD   3 mL at 07/04/24 1048    nicotine (NICODERM CQ) 14 MG/24HR 24 hr patch 1 patch  1 patch Transdermal Daily Bruce Ulloa MD   1 patch at 07/05/24 1009    polyethylene glycol (MIRALAX) Packet 17 g  17 g Oral Daily Bruce Ulloa MD   17 g at 07/05/24 1009    rosuvastatin (CRESTOR) tablet 10 mg  10 mg Oral At Bedtime Bethanie Staley MD   10 mg at 07/04/24 2153    saccharomyces boulardii (FLORASTOR) capsule 250 mg  250 mg Oral BID Bruce Ulloa MD   250 mg at 07/05/24 1008    sodium chloride (PF) 0.9% PF flush 10 mL  10 mL Intracatheter Q8H Bethanie Staley MD   10 mL at 07/04/24 2153       Data   Recent Labs   Lab 07/05/24  0605 07/04/24  0621 07/03/24  0556   WBC 4.5 4.5 4.3   HGB 8.0* 8.0* 8.6*   MCV 94 93 92   * 123* 126*   * 131* 128*   POTASSIUM 4.3 4.8 4.6   CHLORIDE 91* 93* 93*   CO2 29 28 26   BUN 61.0* 61.1* 60.0*   CR 2.09* 2.23* 2.28*   ANIONGAP 9 10 9   SONIA 8.0* 7.9* 7.8*   GLC 73 74 73   ALBUMIN 2.6* 2.5* 2.7*   PROTTOTAL  --  4.7*  --    BILITOTAL  --  <0.2  --    ALKPHOS  --  51  --    ALT  --  15  --    AST  --  13   --        Imaging:

## 2024-07-05 NOTE — PLAN OF CARE
Diagnosis: pleural effusion, LIMA  Mental Status: A&Ox4  Activity/dangle up 1 GB pivot to personal wheelchair  Diet: 2gm NA, gluten free  Pain: Po dilaudid, tylenol  Alvarez/Voiding: purewick  02/LDA: 1L NC. PICC LUE  D/C Date: pending  Other Info: sacral dressing CDI

## 2024-07-05 NOTE — PROGRESS NOTES
[facility]     Renal Progress Note       SHORTHAND KEY FOR MY NOTES:  c = with, s = without, p = after, a = before, x = except, asx = asymptomatic, tx = transplant or treatment, sx = symptoms or symptomatic, cx = canceled or culture, rxn = reaction, yday = yesterday, nl = normal, abx = antibiotics, fxn = function, dx = diagnosis, dz = disease, m/h = melena/hematochezia, c/d/l/ha = cramping/dizziness/lightheadedness/headache, d/c = discharge or diarrhea/constipation, f/c/n/v = fevers/chills/nausea/vomiting, cp/sob = chest pain/shortness of breath, tbv = total body volume, rxn = reaction, tdc = tunneled dialysis catheter, pta = prior to admission, hd = hemodialysis, pd = peritoneal dialysis, hhd = home hemodialysis, edw = estimated dry wt         Assessment/Plan:     1.  LIMA/CKD IV.  Pt is TBV up from hypoalbuminemic third-spacing, but she may be starting to get a bit contracted.  Her BUN is stable ~60 and Cr is in the low 2s, but the CO2 continues to trend up slowly.  Urinating well c the oral bumet.  A.  Continue bumet 2 mg po tid.  B.  Follow labs, uo, sx daily.  C.  If CO2 continues to rise, then may need to decrease the dose of diuretics.    2.  Hyponatremia.  Na is a little lower today, but generally stable p taking away the fluid restriction.   A.  Follow Na daily.  B.  If Na drops further, may need to add back a fluid restriction.    3.  Anemia. Hb is stable but low.  A.  Follow hb, clinically.    4.  FEN.  Phos is improving c ca acetate.  Other electrolytes are fine.  A.  Follow electrolytes daily.  B.  Continue Ca acetate 667 mg TID AC.    Case d/w Dr. Staley.  We will not see the pt over the weekend unless specifically called.  Dr. Alegria is covering the weekend.        Interval History:     No issues c breathing. Good uo c oral bumet.          Medications and Allergies:     Current Facility-Administered Medications   Medication Dose Route Frequency Provider Last Rate Last Admin     amLODIPine (NORVASC) tablet 10 mg  10 mg Oral Daily Devin Ravi MD   10 mg at 07/05/24 1007    bumetanide (BUMEX) tablet 2 mg  2 mg Oral TID Hardy Falcon MD   2 mg at 07/05/24 1008    calcium acetate (PHOSLO) capsule 667 mg  667 mg Oral TID w/meals Hardy Falcon MD   667 mg at 07/05/24 1007    carvedilol (COREG) tablet 12.5 mg  12.5 mg Oral BID w/meals Devin Ravi MD   12.5 mg at 07/05/24 1008    clopidogrel (PLAVIX) tablet 75 mg  75 mg Oral Daily Devin Ravi MD   75 mg at 07/05/24 1008    famotidine (PEPCID) tablet 10 mg  10 mg Oral Daily Devin Ravi MD   10 mg at 07/05/24 1007    fluticasone-vilanterol (BREO ELLIPTA) 200-25 MCG/ACT inhaler 1 puff  1 puff Inhalation Daily Bruce Ulloa MD   1 puff at 07/05/24 1014    ipratropium - albuterol 0.5 mg/2.5 mg/3 mL (DUONEB) neb solution 3 mL  3 mL Nebulization 4x daily Rod Nath MD   3 mL at 07/04/24 1048    nicotine (NICODERM CQ) 14 MG/24HR 24 hr patch 1 patch  1 patch Transdermal Daily Bruce Ulloa MD   1 patch at 07/05/24 1009    polyethylene glycol (MIRALAX) Packet 17 g  17 g Oral Daily Bruce Ulloa MD   17 g at 07/05/24 1009    rosuvastatin (CRESTOR) tablet 10 mg  10 mg Oral At Bedtime Bethanie Staley MD   10 mg at 07/04/24 2153    saccharomyces boulardii (FLORASTOR) capsule 250 mg  250 mg Oral BID Bruce Ulloa MD   250 mg at 07/05/24 1008    sodium chloride (PF) 0.9% PF flush 10 mL  10 mL Intracatheter Q8H Bethanie Staley MD   10 mL at 07/04/24 2153     Allergies   Allergen Reactions    Contrast Dye Anaphylaxis     Pt reported facial and throat swelling with prior CT contrast    Pantoprazole      Protonix caused diffuse edema    Chantix [Varenicline]      Terrible dreams    Gluten Meal GI Disturbance     Pt has celiac disease    Penicillins Itching and Rash          Physical Exam:     Vitals were reviewed     , Blood pressure (!) 174/71, pulse 68, temperature 98.2  F (36.8  C), temperature source  Oral, resp. rate 16, weight 52.3 kg (115 lb 4.8 oz), SpO2 100%, not currently breastfeeding.  Wt Readings from Last 3 Encounters:   07/03/24 52.3 kg (115 lb 4.8 oz)   06/05/24 60.3 kg (132 lb 15 oz)   05/29/24 62.3 kg (137 lb 5.6 oz)     Intake/Output Summary (Last 24 hours) at 7/5/2024 1250  Last data filed at 7/5/2024 0607  Gross per 24 hour   Intake --   Output 1375 ml   Net -1375 ml     GENERAL APPEARANCE: lying in bed, NAD  RESP:  breathing ok  EXTREMITIES/SKIN: 1+ LLE edema, R AKA         Data:     CBC RESULTS:     Recent Labs   Lab 07/05/24  0605 07/04/24  0621 07/03/24  0556 07/02/24  0603 07/01/24  0609 06/30/24  0629   WBC 4.5 4.5 4.3 4.2 3.7* 4.0   RBC 2.76* 2.67* 2.90* 2.97* 2.67* 2.67*   HGB 8.0* 8.0* 8.6* 9.1* 7.9* 8.3*   HCT 25.8* 24.9* 26.7* 27.4* 24.3* 24.7*   * 123* 126* 117* 115* 132*     Basic Metabolic Panel:  Recent Labs   Lab 07/05/24  0605 07/04/24 0621 07/03/24  0556 07/02/24  0603 07/01/24  0609 06/30/24  0629   * 131* 128* 126* 125* 126*   POTASSIUM 4.3 4.8 4.6 5.1 4.9 4.6   CHLORIDE 91* 93* 93* 91* 91* 91*   CO2 29 28 26 27 27 26   BUN 61.0* 61.1* 60.0* 60.4* 59.4* 52.4*   CR 2.09* 2.23* 2.28* 2.48* 2.62* 2.68*   GLC 73 74 73 76 72 76   SONIA 8.0* 7.9* 7.8* 7.8* 7.4* 7.6*     INRNo lab results found in last 7 days.   Attestation:   I have reviewed today's relevant vital signs, notes, medications, labs and imaging.    Hardy Falcon MD  [facility] - Nephrology  [phone]

## 2024-07-06 LAB
ALBUMIN SERPL BCG-MCNC: 2.8 G/DL (ref 3.5–5.2)
ANION GAP SERPL CALCULATED.3IONS-SCNC: 8 MMOL/L (ref 7–15)
BUN SERPL-MCNC: 60.5 MG/DL (ref 8–23)
CALCIUM SERPL-MCNC: 8.1 MG/DL (ref 8.8–10.2)
CHLORIDE SERPL-SCNC: 90 MMOL/L (ref 98–107)
CREAT SERPL-MCNC: 1.98 MG/DL (ref 0.51–0.95)
DEPRECATED HCO3 PLAS-SCNC: 30 MMOL/L (ref 22–29)
EGFRCR SERPLBLD CKD-EPI 2021: 27 ML/MIN/1.73M2
ERYTHROCYTE [DISTWIDTH] IN BLOOD BY AUTOMATED COUNT: 13.6 % (ref 10–15)
GLUCOSE SERPL-MCNC: 78 MG/DL (ref 70–99)
HCT VFR BLD AUTO: 27.6 % (ref 35–47)
HGB BLD-MCNC: 8.9 G/DL (ref 11.7–15.7)
MCH RBC QN AUTO: 30.3 PG (ref 26.5–33)
MCHC RBC AUTO-ENTMCNC: 32.2 G/DL (ref 31.5–36.5)
MCV RBC AUTO: 94 FL (ref 78–100)
PHOSPHATE SERPL-MCNC: 4.6 MG/DL (ref 2.5–4.5)
PLATELET # BLD AUTO: 165 10E3/UL (ref 150–450)
POTASSIUM SERPL-SCNC: 4.2 MMOL/L (ref 3.4–5.3)
RBC # BLD AUTO: 2.94 10E6/UL (ref 3.8–5.2)
SODIUM SERPL-SCNC: 128 MMOL/L (ref 135–145)
WBC # BLD AUTO: 4.6 10E3/UL (ref 4–11)

## 2024-07-06 PROCEDURE — 120N000001 HC R&B MED SURG/OB

## 2024-07-06 PROCEDURE — 250N000013 HC RX MED GY IP 250 OP 250 PS 637: Performed by: HOSPITALIST

## 2024-07-06 PROCEDURE — 250N000013 HC RX MED GY IP 250 OP 250 PS 637: Performed by: INTERNAL MEDICINE

## 2024-07-06 PROCEDURE — 99232 SBSQ HOSP IP/OBS MODERATE 35: CPT | Performed by: HOSPITALIST

## 2024-07-06 PROCEDURE — 85027 COMPLETE CBC AUTOMATED: CPT | Performed by: INTERNAL MEDICINE

## 2024-07-06 PROCEDURE — 80069 RENAL FUNCTION PANEL: CPT | Performed by: INTERNAL MEDICINE

## 2024-07-06 RX ORDER — HYDRALAZINE HYDROCHLORIDE 10 MG/1
10 TABLET, FILM COATED ORAL 4 TIMES DAILY
Status: DISCONTINUED | OUTPATIENT
Start: 2024-07-06 | End: 2024-07-06

## 2024-07-06 RX ORDER — HYDRALAZINE HYDROCHLORIDE 10 MG/1
10 TABLET, FILM COATED ORAL EVERY 8 HOURS SCHEDULED
Status: DISCONTINUED | OUTPATIENT
Start: 2024-07-06 | End: 2024-07-08 | Stop reason: HOSPADM

## 2024-07-06 RX ORDER — IPRATROPIUM BROMIDE AND ALBUTEROL SULFATE 2.5; .5 MG/3ML; MG/3ML
3 SOLUTION RESPIRATORY (INHALATION) 4 TIMES DAILY PRN
Status: DISCONTINUED | OUTPATIENT
Start: 2024-07-06 | End: 2024-07-08 | Stop reason: HOSPADM

## 2024-07-06 RX ADMIN — BUMETANIDE 2 MG: 1 TABLET ORAL at 18:20

## 2024-07-06 RX ADMIN — HYDROMORPHONE HYDROCHLORIDE 2 MG: 2 TABLET ORAL at 13:40

## 2024-07-06 RX ADMIN — CALCIUM ACETATE 667 MG: 667 CAPSULE ORAL at 18:20

## 2024-07-06 RX ADMIN — HYDRALAZINE HYDROCHLORIDE 10 MG: 10 TABLET ORAL at 22:14

## 2024-07-06 RX ADMIN — CALCIUM ACETATE 667 MG: 667 CAPSULE ORAL at 09:38

## 2024-07-06 RX ADMIN — HYDRALAZINE HYDROCHLORIDE 10 MG: 10 TABLET ORAL at 13:29

## 2024-07-06 RX ADMIN — BUMETANIDE 2 MG: 1 TABLET ORAL at 09:38

## 2024-07-06 RX ADMIN — Medication 250 MG: at 20:20

## 2024-07-06 RX ADMIN — AMLODIPINE BESYLATE 10 MG: 10 TABLET ORAL at 09:38

## 2024-07-06 RX ADMIN — CARVEDILOL 12.5 MG: 12.5 TABLET, FILM COATED ORAL at 09:38

## 2024-07-06 RX ADMIN — Medication 250 MG: at 09:37

## 2024-07-06 RX ADMIN — ROSUVASTATIN CALCIUM 10 MG: 10 TABLET, FILM COATED ORAL at 22:14

## 2024-07-06 RX ADMIN — CARVEDILOL 12.5 MG: 12.5 TABLET, FILM COATED ORAL at 18:19

## 2024-07-06 RX ADMIN — CALCIUM ACETATE 667 MG: 667 CAPSULE ORAL at 14:37

## 2024-07-06 RX ADMIN — HYDROMORPHONE HYDROCHLORIDE 2 MG: 2 TABLET ORAL at 02:36

## 2024-07-06 RX ADMIN — HYDROMORPHONE HYDROCHLORIDE 2 MG: 2 TABLET ORAL at 20:26

## 2024-07-06 RX ADMIN — ACETAMINOPHEN 650 MG: 325 TABLET, FILM COATED ORAL at 20:25

## 2024-07-06 RX ADMIN — BUMETANIDE 2 MG: 1 TABLET ORAL at 13:30

## 2024-07-06 RX ADMIN — FAMOTIDINE 10 MG: 10 TABLET, FILM COATED ORAL at 09:37

## 2024-07-06 RX ADMIN — CLOPIDOGREL BISULFATE 75 MG: 75 TABLET ORAL at 09:38

## 2024-07-06 RX ADMIN — ACETAMINOPHEN 650 MG: 325 TABLET, FILM COATED ORAL at 02:36

## 2024-07-06 RX ADMIN — NICOTINE 1 PATCH: 14 PATCH, EXTENDED RELEASE TRANSDERMAL at 09:40

## 2024-07-06 ASSESSMENT — ACTIVITIES OF DAILY LIVING (ADL)
ADLS_ACUITY_SCORE: 46
ADLS_ACUITY_SCORE: 51
ADLS_ACUITY_SCORE: 46
ADLS_ACUITY_SCORE: 47
ADLS_ACUITY_SCORE: 46
ADLS_ACUITY_SCORE: 47
ADLS_ACUITY_SCORE: 46
ADLS_ACUITY_SCORE: 47
ADLS_ACUITY_SCORE: 46
ADLS_ACUITY_SCORE: 46
ADLS_ACUITY_SCORE: 47
ADLS_ACUITY_SCORE: 46
ADLS_ACUITY_SCORE: 47
ADLS_ACUITY_SCORE: 47
ADLS_ACUITY_SCORE: 46
ADLS_ACUITY_SCORE: 46
ADLS_ACUITY_SCORE: 47
ADLS_ACUITY_SCORE: 46

## 2024-07-06 NOTE — PLAN OF CARE
Goal Outcome Evaluation:        Diagnosis: Pleural Effusion, LIMA    Mental Status: A & O x 4  Activity/dangle: Can be up to chair with sliding board but in bed today  Diet: 2g NA diet, gluten free  Pain:Po dilaudid  Alvarez/Voiding:Purewick  Tele/Restraints/Iso:NA  02/LDA:PICC line in place, O2-1L  D/C Date:Pending  Other Info: repositioned, dressing on R stump and coccyx are CDI. Edema on left foot

## 2024-07-06 NOTE — PLAN OF CARE
Goal Outcome Evaluation: progressing    6/21/24 admit, SOB    A&O, calm and pleasant, BP elevated, on 1L O2, can be RA, Dilaudid and Tylenol for pain, Voiding using purewick while in bed, 1 BM today, transfers to  using sliding board, tolerating diet, Nephrology following, dressing changes done today.

## 2024-07-06 NOTE — PLAN OF CARE
Goal Outcome Evaluation:      Plan of Care Reviewed With: patient        Diagnosis: Pleural Effusion, LIMA  POD#: NA  Mental Status: A&Ox4  Activity/dangle Assist 1 GB pivot to Wheelchair  Diet: 2g Na, gluten free   Pain: dilaudid, tylenol  Alvarez/Voiding: purewick   Tele/Restraints/Iso: NA  02/LDA: 1 L NC overnight, PICC RUE  D/C Date: TBD  Other Info: left AKA, sacral wound with mepilex on

## 2024-07-06 NOTE — PROGRESS NOTES
[facility]    Hospitalist Progress Note    Interval History   Reviewed with patient her home situation.  She lives with brother and son, sister will stay with her on discharge.  Mobility per slide board to wheelchair, she is AKA.  Receives home care/wound care 3 times per week.  She states she was started on OT/PT.  She is followed by PCP [facility] clinic and will start nephrology follow-up on discharge.    Assessment & Plan   Summary: Shirley Hendricks is a 63 year old female with PMH CAD, MI, peripheral vascular disease status angioplasty and right AKA with wound vac, DLE, COPD, CMT disease, tobacco use, CKD stage III, GERD, hypertension, history of B-cell lymphoma, hyperlipidemia, depression, anxiety, who was admitted on 6/21/2024 with acute hypoxic respiratory failure due to a large left pleural effusion, hyperkalemia, and hyponatremia.   Patient recently admitted to [facility] 3/29-4/22/24 with right limb ischemia in setting of severe PAD ultimately requiring right AKA with complicated post op course (including LIMA requiring HD) after which she was discharged to  [facility]. She was readmitted to [facility] 5/15-5/29/24 with stump eschars requiring surgical debridement and wound VAC placement.   This admission, patient underwent left thoracentesis with 800mL and 1200mL fluid removed. Severe hyperkalemia improved with Lokelma. Patient had been anuric since admission, started on dialysis on 6/23 now with some renal recovery. Chest tube placed 6/23, CT on 6/24 shows ongoing left bronchus obstruction, felt to be mucous plugging per Pulm. Improved after dialysis 6/24, now making urine, O2 needs down to 2-3L.   S/p bronchoscopy 6/26 with mucous plugging removed. Chest tube to be removed 6/26. Lung re-expansion noted 6/26. Renal function stabilizing 6/28.     Left mainstem bronchial obstruction due to mucous plugging s/p bronchoscopy 6/26/2024  Acute hypoxic respiratory failure due to above,  "improved  Large left pleural effusion, transudative  S/p left thoracentesis 800mL 6/21, 1200mL 6/22  S/p left chest tube 6/23/2024  Right moderate pleural effusion  Patient with increasing shortness of breath for 1-2 weeks PTA. CXR 6/21 in ED reveals complete opacification of the left hemithorax. CT chest-large left pleural effusion with complete collapse of the left upper and left lower and occlusion of left mainstem bronchus and a moderate size right pleural effusion. Patient underwent thoracentesis 800mL on 6/21, fluid appears transudative. Patient appears markedly volume overloaded. In the ED she initially required 2L O2 but was transitioned to HFNC overnight 6/22 to 6/23 during RRT.  Reports symptomatic improvement with thoracentesis 6/21 and 6/22, but continues to require 10+L O2 or HFNC on 6/23. Discussed with Pulm on 6/23, concern remains left mainstem bronchus obstruction but need to rule out recurrent pleural effusion as cause, so chest tube placed on 6/23. CT chest on 6/24 shows persistent persistent left bronchial obstruction, per Pulm likely mucous plugging.  Bronchoscopy done 6/26 with aspiration of mucous plugs. Chest tube removed 6/26. Patient with chest pain due to lung re-expansion 6/26, CXR 6/27 shows improving lung expansion.   It appears that patient's major turnaround started following bronchoscopy on 6/26.    Breathing continues to improve on 6/30. Weaning oxygen.  resp cx with normal nadine.  No fungal elements seen on KOH.  - Pleural fluid cytology: 7/3/2024.  In Epic, cytology \"negative for malignancy, no fungal or pneumocystis organisms viral cystopathic effect negative.\"  - Pulmonology consult  Currently denies dyspnea, hypoxia, cough, sputum.,  On room air.  Minimal activity out of bed, encourage p.o.      Anuric renal failure on CKD, requiring brief dialysis, improved  Recent acute renal failure in May 2024 needing hemodialysis  Severe hyperkalemia, improved  Hyponatremia  Metabolic " "acidosis  Recent baseline creatinine has been fluctuating between 1.9-2.8. On admission Creatinine is 3.07, potassium is 7.5, sodium of 128 with bicarb of 17.   In the ER patient did receive albuterol, 2 g calcium gluconate, insulin, Kayexalate 10 g and 40 IV Lasix and 1 L IV fluid bolus. EKG showed sinus bradycardia with prolonged QT. Potassium has been elevated to 7.1 since admission, improved to 5.3 on 6/23 with Lokelma.  Patient has been anuric since admission, no urine output. Etiology of anuric renal failure likely related to left mainstem bronchus obstruction. Due to ongoing anuria, TDC placed 6/23 and dialyzed 6/23 and 6/24.  Started to making urine 6/25, creatinine has stabilized on 6/28 and now fluctuating 2.7-3  Had received HD.  - Nephrology consult.  Appreciate input, see note 7/2/2024 received IV albumin  CR stable to improved.  Hemoglobin increased likely suggesting the past was delusional.  No signs of active bleeding.  Another dose of albumin t given continue IV Bumex, follow clinically including BMP, may be able to pull HD catheter if continued improvement.\"  -7/3/2024 see nephrology note, discussed with Dr. Falcon.  In summary, HD catheter pulled per IR, continue IV Bumex plus albumin until plateaus then changed to oral Bumex.  Diet liberalized no potassium or phosphate restrictions, continue current diet of sodium restriction and gluten free.  7/4/2024, seen nephrology note, Dr. Truong.  Now on oral Bumex.  HD catheter out.  7/5/2024 discussed with Dr. Falcon, nephrology.  Concern of rising CO2, may be the due to diuresis.  Continue to follow CO2 and if increased contraction alkalosis decrease Bumex to 1 mg 3 times daily currently 2 mg twice daily.  If progressive hyponatremia may need further increase fluid restrictions.  -7/6/2024: Sodium 128, CO2 30, CR continues to improve at 1.98, CR max 3.74.  CBC stable.  Although CO2 is now 30 we will keep on current dose of Bumex today and recheck in AM.  If " further contraction alkalosis decrease Bumex as discussed with Dr. Falcon yesterday otherwise concerned that decreasing loop diuretics will result in decreasing sodium and need to start on fluid restriction which she would like to avoid.      Diastolic congestive heart failure exacerbation  Elevated troponin suspect due to volume overload  Patient presents with large left pleural effusion, BNP  elevated to 11.9k. Note history of stress cardiomyopathy in Oct 2023 with EF 30-35% which then resolved on echo Nov 2023.    Patient does not have any significant chest discomfort and troponin are 174 and 177 and trending down to 156. Echo 6/22 shows Ef 60-65%, mid anterior and lateral severe hypokinessis, distal inferior hypokinesis, normal RV function. Suspect large component of renal failure contributing. Felt less likely to be CHF as this was likely due to renal failure.   - Cardiology consult   - Nephrology, see above  - Tele, weights, I&O  - Diuresis per nephro/cards.  See above    Systolic murmur: Noted systolic murmur 6/23, limited echo 6/24 shows no change from 6/22, likely flow murmur. Murmur improved 6/28.    Anemia of chronic disease  Acute anemia 6/23 likely spurious  Recent baseline hemoglobin has been fluctuating and has been between 8.4-10.1.   Hemoglobin 8.4 on 6/22-->6.4 on 6/23. Patient denies blood loss. Bilirubin is not elevated makes hemolysis less likely. Patient was given 1u pRBC, recheck Hgb was 9.5 via skin draw. The Hgb of 6.4 likely was spurious related to an inaccurate PICC line draw, likely Hgb was 8.5-->9.5 with 1u pRBC.  - [facility] consult appreciated, signed off  - Daily Hgb checks, see above    Hypertension  Hyperlipidemia  History of coronary disease with an MI in 2019  Left heart cath on 10/4/2023 showed-completely occluded D1 with left to left collaterals from distal LAD to D1, moderate CAD involving proximal to mid RCA not significant by IFR and moderate coronary disease please involving  distal small caliber RPL and distal circumflex artery. Last echo  showed EF of 55 to 60% with normal RV and moderate trileaflet aortic sclerosis  - Resume PTA Coreg   - Continue amlodipine 10mg daily  -Prior hospitalist held patient's rosuvastatin, unclear reason.  Discussed with patient who denies myalgia, normal LFTs.  In patient with severe PAD restart rosuvastatin, close monitor.  LFTs in a.m. 2024, no myalgias, LFTs WNL.    History of peripheral vascular disease status above-knee amputation on the right with wound dressing status washout on   Neuropathic pain  Patient has followed with vascular surgery and has noticed during last hospitalization in May 2024 she underwent washout with wound VAC placement and was discharged on Plavix and Crestor. On exam the amputation stump is currently wrapped, no obvious discharge from what I can see on exam   - Continue with PTA Plavix 75 mg daily  - wound vac now removed, vascular surgery consult appreciated  - Pain control with hydromorphone oral PRN (avoid oxycodone as it is renally cleared)  -See above related to rosuvastatin.  No myalgias, no increased LFTs.    COPD not in exacerbation  - On exam has no wheezing and continue with as needed symbicort    GERD: Famotidine BID    History of B-cell lymphoma  - Follows with [place] [facility]  - Pending cytology from thoracentesis     Tobacco use  - Continue with nicotine patch    Ongoing stressors  Patient had a recent AKA in 2024 with complicated post op course 2024, and reports   2024.    Clinically Significant Risk Factors         # Hyponatremia: Lowest Na = 128 mmol/L in last 2 days, will monitor as appropriate      # Hypoalbuminemia: Lowest albumin = 2.1 g/dL at 2024  6:21 AM, will monitor as appropriate     # Hypertension: Noted on problem list             #Precipitous drop in Hgb/Hct: Lowest Hgb this hospitalization: 6.4 g/dL. Will continue to monitor and  treat/transfuse as appropriate.      # Moderate Malnutrition: based on nutrition assessment      # Financial/Environmental Concerns: none          PT/OT: ordered  Diet: Snacks/Supplements Adult: Expedite Bottle; Between Meals  Combination Diet Gluten Free Diet; 2 gm NA Diet    DVT Prophylaxis: none.  Encourage patient out of bed, up and sitting with meals.  Alvarez Catheter: Not present  Lines: PRESENT      PICC 06/22/24 Double Lumen Left Brachial vein medial access-Site Assessment: WDL    Cardiac Monitoring: None  Code Status: Full Code    Medically Ready for Discharge: >2days pending LIMA,     Bethanie Staley MD  Hospitalist Service  [facility]    I spent 35 minutes total time in management of care today 7/6/2024 reviewing labs, medications, interdisciplinary notes; and completing documentation of encounter and orders with Care Management including counseling/discussion withthe Patient regarding LIMA, fluids and Coordinating Care and plan with Nursing and Specialists, Nephrology regarding  management and surveillance     Data reviewed today: I reviewed all new labs and imaging results over the last 24 hours.    Physical Exam   Temp: 98  F (36.7  C) Temp src: Oral BP: (!) 147/66 Pulse: 60   Resp: 17 SpO2: 98 % O2 Device: Nasal cannula Oxygen Delivery: 1 LPM  Vitals:    07/02/24 0600 07/03/24 0650 07/06/24 0544   Weight: 55.3 kg (121 lb 14.6 oz) 52.3 kg (115 lb 4.8 oz) 50.7 kg (111 lb 12.4 oz)     General/Constitutional:   NAD, alert, calm, cooperative  Chest/Respiratory: Respirations nonlabored room air  Cardiovascular:  regular, no murmur appreciated.  LE edema none status post right AKA  Gastrointestinal/Abdomen:  soft, nontender, PureWick present, clear yellow urine.    Neuro.  Gross motor tested, nonfocal, R AKA  Psych oriented, affect: Calm.    Medications   Current Facility-Administered Medications   Medication Dose Route Frequency Provider Last Rate Last Admin    No lozenges or gum  should be given while patient on BIPAP/AVAPS/AVAPS AE   Does not apply Continuous PRN Devin Ravi MD        Patient may continue current oral medications   Does not apply Continuous PRN Devin Ravi MD         Current Facility-Administered Medications   Medication Dose Route Frequency Provider Last Rate Last Admin    amLODIPine (NORVASC) tablet 10 mg  10 mg Oral Daily Devin Ravi MD   10 mg at 07/06/24 0938    bumetanide (BUMEX) tablet 2 mg  2 mg Oral TID Hardy Falcon MD   2 mg at 07/06/24 0938    calcium acetate (PHOSLO) capsule 667 mg  667 mg Oral TID w/meals Hardy Falcon MD   667 mg at 07/06/24 0938    carvedilol (COREG) tablet 12.5 mg  12.5 mg Oral BID w/meals Devin Ravi MD   12.5 mg at 07/06/24 0938    clopidogrel (PLAVIX) tablet 75 mg  75 mg Oral Daily Devin Ravi MD   75 mg at 07/06/24 0938    famotidine (PEPCID) tablet 10 mg  10 mg Oral Daily Devin Ravi MD   10 mg at 07/06/24 0937    fluticasone-vilanterol (BREO ELLIPTA) 200-25 MCG/ACT inhaler 1 puff  1 puff Inhalation Daily Bruce Ulloa MD   1 puff at 07/05/24 1014    hydrALAZINE (APRESOLINE) tablet 10 mg  10 mg Oral Q8H SAMANTHA Bethanie Staley MD        ipratropium - albuterol 0.5 mg/2.5 mg/3 mL (DUONEB) neb solution 3 mL  3 mL Nebulization 4x daily Rod Nath MD   3 mL at 07/04/24 1048    nicotine (NICODERM CQ) 14 MG/24HR 24 hr patch 1 patch  1 patch Transdermal Daily Bruce Ulloa MD   1 patch at 07/06/24 0940    polyethylene glycol (MIRALAX) Packet 17 g  17 g Oral Daily Bruce Ulloa MD   17 g at 07/05/24 1009    rosuvastatin (CRESTOR) tablet 10 mg  10 mg Oral At Bedtime Bethanie Staley MD   10 mg at 07/05/24 2152    saccharomyces boulardii (FLORASTOR) capsule 250 mg  250 mg Oral BID Bruce Ulloa MD   250 mg at 07/06/24 0937    sodium chloride (PF) 0.9% PF flush 10 mL  10 mL Intracatheter Q8H Bethanie Staley MD   10 mL at 07/06/24 0942       Data   Recent Labs   Lab  07/06/24  0540 07/05/24  0605 07/04/24  0621   WBC 4.6 4.5 4.5   HGB 8.9* 8.0* 8.0*   MCV 94 94 93    144* 123*   * 129* 131*   POTASSIUM 4.2 4.3 4.8   CHLORIDE 90* 91* 93*   CO2 30* 29 28   BUN 60.5* 61.0* 61.1*   CR 1.98* 2.09* 2.23*   ANIONGAP 8 9 10   SONIA 8.1* 8.0* 7.9*   GLC 78 73 74   ALBUMIN 2.8* 2.6* 2.5*   PROTTOTAL  --   --  4.7*   BILITOTAL  --   --  <0.2   ALKPHOS  --   --  51   ALT  --   --  15   AST  --   --  13       Imaging:

## 2024-07-07 LAB
ALBUMIN SERPL BCG-MCNC: 2.6 G/DL (ref 3.5–5.2)
ANION GAP SERPL CALCULATED.3IONS-SCNC: 6 MMOL/L (ref 7–15)
BUN SERPL-MCNC: 58.7 MG/DL (ref 8–23)
CALCIUM SERPL-MCNC: 8.1 MG/DL (ref 8.8–10.2)
CHLORIDE SERPL-SCNC: 91 MMOL/L (ref 98–107)
CREAT SERPL-MCNC: 2.01 MG/DL (ref 0.51–0.95)
DEPRECATED HCO3 PLAS-SCNC: 32 MMOL/L (ref 22–29)
EGFRCR SERPLBLD CKD-EPI 2021: 27 ML/MIN/1.73M2
ERYTHROCYTE [DISTWIDTH] IN BLOOD BY AUTOMATED COUNT: 13.5 % (ref 10–15)
GLUCOSE SERPL-MCNC: 74 MG/DL (ref 70–99)
HCT VFR BLD AUTO: 25.4 % (ref 35–47)
HGB BLD-MCNC: 8.1 G/DL (ref 11.7–15.7)
MCH RBC QN AUTO: 30.2 PG (ref 26.5–33)
MCHC RBC AUTO-ENTMCNC: 31.9 G/DL (ref 31.5–36.5)
MCV RBC AUTO: 95 FL (ref 78–100)
PHOSPHATE SERPL-MCNC: 4.5 MG/DL (ref 2.5–4.5)
PLATELET # BLD AUTO: 165 10E3/UL (ref 150–450)
POTASSIUM SERPL-SCNC: 4.3 MMOL/L (ref 3.4–5.3)
RBC # BLD AUTO: 2.68 10E6/UL (ref 3.8–5.2)
SODIUM SERPL-SCNC: 129 MMOL/L (ref 135–145)
WBC # BLD AUTO: 4.2 10E3/UL (ref 4–11)

## 2024-07-07 PROCEDURE — 120N000001 HC R&B MED SURG/OB

## 2024-07-07 PROCEDURE — 999N000040 HC STATISTIC CONSULT NO CHARGE VASC ACCESS

## 2024-07-07 PROCEDURE — 250N000013 HC RX MED GY IP 250 OP 250 PS 637: Performed by: INTERNAL MEDICINE

## 2024-07-07 PROCEDURE — 80069 RENAL FUNCTION PANEL: CPT | Performed by: INTERNAL MEDICINE

## 2024-07-07 PROCEDURE — 250N000013 HC RX MED GY IP 250 OP 250 PS 637: Performed by: HOSPITALIST

## 2024-07-07 PROCEDURE — 99232 SBSQ HOSP IP/OBS MODERATE 35: CPT | Performed by: HOSPITALIST

## 2024-07-07 PROCEDURE — 85027 COMPLETE CBC AUTOMATED: CPT | Performed by: INTERNAL MEDICINE

## 2024-07-07 PROCEDURE — 999N000190 HC STATISTIC VAT ROUNDS

## 2024-07-07 RX ORDER — BUMETANIDE 1 MG/1
1 TABLET ORAL
Status: DISCONTINUED | OUTPATIENT
Start: 2024-07-07 | End: 2024-07-08 | Stop reason: HOSPADM

## 2024-07-07 RX ADMIN — HYDRALAZINE HYDROCHLORIDE 10 MG: 10 TABLET ORAL at 06:46

## 2024-07-07 RX ADMIN — NICOTINE 1 PATCH: 14 PATCH, EXTENDED RELEASE TRANSDERMAL at 09:30

## 2024-07-07 RX ADMIN — HYDROMORPHONE HYDROCHLORIDE 2 MG: 2 TABLET ORAL at 09:41

## 2024-07-07 RX ADMIN — CLOPIDOGREL BISULFATE 75 MG: 75 TABLET ORAL at 09:27

## 2024-07-07 RX ADMIN — HYDROMORPHONE HYDROCHLORIDE 2 MG: 2 TABLET ORAL at 15:13

## 2024-07-07 RX ADMIN — BUMETANIDE 1 MG: 1 TABLET ORAL at 09:28

## 2024-07-07 RX ADMIN — CARVEDILOL 12.5 MG: 12.5 TABLET, FILM COATED ORAL at 09:27

## 2024-07-07 RX ADMIN — CALCIUM ACETATE 667 MG: 667 CAPSULE ORAL at 14:04

## 2024-07-07 RX ADMIN — BUMETANIDE 1 MG: 1 TABLET ORAL at 14:01

## 2024-07-07 RX ADMIN — AMLODIPINE BESYLATE 10 MG: 10 TABLET ORAL at 09:28

## 2024-07-07 RX ADMIN — ROSUVASTATIN CALCIUM 10 MG: 10 TABLET, FILM COATED ORAL at 22:21

## 2024-07-07 RX ADMIN — HYDROMORPHONE HYDROCHLORIDE 2 MG: 2 TABLET ORAL at 03:56

## 2024-07-07 RX ADMIN — Medication 250 MG: at 09:28

## 2024-07-07 RX ADMIN — CALCIUM ACETATE 667 MG: 667 CAPSULE ORAL at 17:06

## 2024-07-07 RX ADMIN — HYDRALAZINE HYDROCHLORIDE 10 MG: 10 TABLET ORAL at 22:21

## 2024-07-07 RX ADMIN — FAMOTIDINE 10 MG: 10 TABLET, FILM COATED ORAL at 09:28

## 2024-07-07 RX ADMIN — Medication 250 MG: at 22:21

## 2024-07-07 RX ADMIN — HYDROMORPHONE HYDROCHLORIDE 2 MG: 2 TABLET ORAL at 22:21

## 2024-07-07 RX ADMIN — ACETAMINOPHEN 650 MG: 325 TABLET, FILM COATED ORAL at 19:30

## 2024-07-07 RX ADMIN — FLUTICASONE FUROATE AND VILANTEROL TRIFENATATE 1 PUFF: 200; 25 POWDER RESPIRATORY (INHALATION) at 09:28

## 2024-07-07 RX ADMIN — HYDRALAZINE HYDROCHLORIDE 10 MG: 10 TABLET ORAL at 14:01

## 2024-07-07 RX ADMIN — ACETAMINOPHEN 650 MG: 325 TABLET, FILM COATED ORAL at 15:12

## 2024-07-07 RX ADMIN — BUMETANIDE 1 MG: 1 TABLET ORAL at 19:12

## 2024-07-07 RX ADMIN — CARVEDILOL 12.5 MG: 12.5 TABLET, FILM COATED ORAL at 17:06

## 2024-07-07 RX ADMIN — CALCIUM ACETATE 667 MG: 667 CAPSULE ORAL at 09:28

## 2024-07-07 RX ADMIN — HYDROMORPHONE HYDROCHLORIDE 2 MG: 2 TABLET ORAL at 19:30

## 2024-07-07 ASSESSMENT — ACTIVITIES OF DAILY LIVING (ADL)
ADLS_ACUITY_SCORE: 46
ADLS_ACUITY_SCORE: 47
ADLS_ACUITY_SCORE: 46
ADLS_ACUITY_SCORE: 47
ADLS_ACUITY_SCORE: 46
ADLS_ACUITY_SCORE: 47
ADLS_ACUITY_SCORE: 46
ADLS_ACUITY_SCORE: 47
ADLS_ACUITY_SCORE: 47
ADLS_ACUITY_SCORE: 46
ADLS_ACUITY_SCORE: 47
ADLS_ACUITY_SCORE: 46
ADLS_ACUITY_SCORE: 45
ADLS_ACUITY_SCORE: 47
ADLS_ACUITY_SCORE: 46
ADLS_ACUITY_SCORE: 47
ADLS_ACUITY_SCORE: 45

## 2024-07-07 NOTE — PLAN OF CARE
Goal Outcome Evaluation:    Diagnosis:Pleural Effusion, LIMA     Mental Status:A & O x 4  Activity/dangle: In bed, repositioned self. Offered to reposition but refused.  Diet: 2g Na diet, gluten free  Pain:Po dilaudid, tylenol  Alvarez/Voiding:Pure wick  Tele/Restraints/Iso:NA  02/LDA:PICC line, 0.5L-O2  D/C Date:Pending  Other Info: R stump and coccyx dressings were changed. edema in left foot., foot elevated.

## 2024-07-07 NOTE — PROGRESS NOTES
[facility]    Hospitalist Progress Note    Interval History   Denies pain.  Urine per pure wick.  No cough, dyspnea, wheeze.  Uses O2 at night for comfort.    Assessment & Plan   Summary: Shirley Hendricks is a 63 year old female with PMH CAD, MI, peripheral vascular disease status angioplasty and right AKA with wound vac, DLE, COPD, CMT disease, tobacco use, CKD stage III, GERD, hypertension, history of B-cell lymphoma, hyperlipidemia, depression, anxiety, who was admitted on 6/21/2024 with acute hypoxic respiratory failure due to a large left pleural effusion, hyperkalemia, and hyponatremia.   Patient recently admitted to [facility] 3/29-4/22/24 with right limb ischemia in setting of severe PAD ultimately requiring right AKA with complicated post op course (including LIMA requiring HD) after which she was discharged to  ARU. She was readmitted to [facility] 5/15-5/29/24 with stump eschars requiring surgical debridement and wound VAC placement.   This admission, patient underwent left thoracentesis with 800mL and 1200mL fluid removed. Severe hyperkalemia improved with Lokelma. Patient had been anuric since admission, started on dialysis on 6/23 now with some renal recovery. Chest tube placed 6/23, CT on 6/24 shows ongoing left bronchus obstruction, felt to be mucous plugging per Pulm. Improved after dialysis 6/24, now making urine, O2 needs down to 2-3L.   S/p bronchoscopy 6/26 with mucous plugging removed. Chest tube to be removed 6/26. Lung re-expansion noted 6/26. Renal function stabilizing 6/28.     Left mainstem bronchial obstruction due to mucous plugging s/p bronchoscopy 6/26/2024  Acute hypoxic respiratory failure due to above, improved  Large left pleural effusion, transudative  S/p left thoracentesis 800mL 6/21, 1200mL 6/22  S/p left chest tube 6/23/2024  Right moderate pleural effusion  Patient with increasing shortness of breath for 1-2 weeks PTA. CXR 6/21 in ED  "reveals complete opacification of the left hemithorax. CT chest-large left pleural effusion with complete collapse of the left upper and left lower and occlusion of left mainstem bronchus and a moderate size right pleural effusion. Patient underwent thoracentesis 800mL on 6/21, fluid appears transudative. Patient appears markedly volume overloaded. In the ED she initially required 2L O2 but was transitioned to HFNC overnight 6/22 to 6/23 during RRT.  Reports symptomatic improvement with thoracentesis 6/21 and 6/22, but continues to require 10+L O2 or HFNC on 6/23. Discussed with Pulm on 6/23, concern remains left mainstem bronchus obstruction but need to rule out recurrent pleural effusion as cause, so chest tube placed on 6/23. CT chest on 6/24 shows persistent persistent left bronchial obstruction, per Pulm likely mucous plugging.  Bronchoscopy done 6/26 with aspiration of mucous plugs. Chest tube removed 6/26. Patient with chest pain due to lung re-expansion 6/26, CXR 6/27 shows improving lung expansion.   It appears that patient's major turnaround started following bronchoscopy on 6/26.    Breathing continues to improve on 6/30. Weaning oxygen.  resp cx with normal nadine.  No fungal elements seen on KOH.  - Pleural fluid cytology: 7/3/2024.  In Epic, cytology \"negative for malignancy, no fungal or pneumocystis organisms viral cystopathic effect negative.\"  Currently denies dyspnea, hypoxia, cough, sputum.,  During the day on room air, today with O2 in the morning, wean as able.  Minimal activity out of bed, encourage out of bed as able.      Anuric renal failure on CKD, requiring brief dialysis, improved  Recent acute renal failure in May 2024 needing hemodialysis  Severe hyperkalemia, improved  Hyponatremia  Metabolic acidosis  Recent baseline creatinine has been fluctuating between 1.9-2.8. On admission Creatinine is 3.07, potassium is 7.5, sodium of 128 with bicarb of 17.   In the ER patient did receive " albuterol, 2 g calcium gluconate, insulin, Kayexalate 10 g and 40 IV Lasix and 1 L IV fluid bolus. EKG showed sinus bradycardia with prolonged QT. Potassium has been elevated to 7.1 since admission, improved to 5.3 on 6/23 with Lokelma.  Patient has been anuric since admission, no urine output. Etiology of anuric renal failure likely related to left mainstem bronchus obstruction. Due to ongoing anuria, TDC placed 6/23 and dialyzed 6/23 and 6/24.  Started to making urine 6/25, creatinine has stabilized on 6/28 and now fluctuating 2.7-3  7/7/2024.  See intermittent nephrology notes.  Received intermittent HD, HD catheter now pulled.  Was on IV Bumex with IV albumin, on 7/4/2024 changed to oral Bumex 2 mg 3 times daily, makes urine.  Today CR 2.01,, CR maximum 3.74.  Now contraction alkalosis with CO2 increased now at 32, small bump in CR, therefore we will decrease Bumex from 2 to 1 mg 3 times daily, monitor CO2, CR, sodium.  If further decrease in sodium may need to consider restart of fluid restriction.  BMP in AM.      Diastolic congestive heart failure exacerbation  Elevated troponin suspect due to volume overload  Patient presents with large left pleural effusion, BNP  elevated to 11.9k. Note history of stress cardiomyopathy in Oct 2023 with EF 30-35% which then resolved on echo Nov 2023.    Patient does not have any significant chest discomfort and troponin are 174 and 177 and trending down to 156. Echo 6/22 shows Ef 60-65%, mid anterior and lateral severe hypokinessis, distal inferior hypokinesis, normal RV function. Suspect large component of renal failure contributing. Felt less likely to be CHF as this was likely due to renal failure.   - Cardiology consult   - Nephrology, see above  - Tele, weights, I&O  - Diuresis per nephro/cards.  See above    Systolic murmur: Noted systolic murmur 6/23, limited echo 6/24 shows no change from 6/22, likely flow murmur. Murmur improved 6/28.    Anemia of chronic  disease  Acute anemia 6/23 likely spurious  Recent baseline hemoglobin has been fluctuating and has been between 8.4-10.1.   Hemoglobin 8.4 on 6/22-->6.4 on 6/23. Patient denies blood loss. Bilirubin is not elevated makes hemolysis less likely. Patient was given 1u pRBC, recheck Hgb was 9.5 via skin draw. The Hgb of 6.4 likely was spurious related to an inaccurate PICC line draw, likely Hgb was 8.5-->9.5 with 1u pRBC.  - [facility] consult appreciated, signed off  - Daily Hgb checks, see above    Hypertension  Hyperlipidemia  History of coronary disease with an MI in 2019  Left heart cath on 10/4/2023 showed-completely occluded D1 with left to left collaterals from distal LAD to D1, moderate CAD involving proximal to mid RCA not significant by IFR and moderate coronary disease please involving distal small caliber RPL and distal circumflex artery. Last echo 11/23 showed EF of 55 to 60% with normal RV and moderate trileaflet aortic sclerosis  - Resume PTA Coreg 6/28  - Continue amlodipine 10mg daily  -Prior hospitalist held patient's rosuvastatin, unclear reason.  Discussed with patient who denies myalgia, normal LFTs.  In patient with severe PAD restart rosuvastatin, close monitor.  LFTs in a.m. 7/4/2024, no myalgias, LFTs WNL.    History of peripheral vascular disease status above-knee amputation on the right with wound dressing status washout on 5/24  Neuropathic pain  Patient has followed with vascular surgery and has noticed during last hospitalization in May 2024 she underwent washout with wound VAC placement and was discharged on Plavix and Crestor. On exam the amputation stump is currently wrapped, no obvious discharge from what I can see on exam   - Continue with PTA Plavix 75 mg daily  - wound vac now removed, vascular surgery consult appreciated  - Pain control with hydromorphone oral PRN (avoid oxycodone as it is renally cleared)  -See above related to rosuvastatin.  No myalgias, no increased LFTs.    COPD  not in exacerbation  - On exam has no wheezing and continue with as needed symbicort    GERD: Famotidine BID    History of B-cell lymphoma  - Follows with [place] [facility]  - Pending cytology from thoracentesis     Tobacco use  - Continue with nicotine patch    Ongoing stressors  Patient had a recent AKA in 2024 with complicated post op course 2024, and reports   2024.    Clinically Significant Risk Factors         # Hyponatremia: Lowest Na = 128 mmol/L in last 2 days, will monitor as appropriate      # Hypoalbuminemia: Lowest albumin = 2.1 g/dL at 2024  6:21 AM, will monitor as appropriate     # Hypertension: Noted on problem list             #Precipitous drop in Hgb/Hct: Lowest Hgb this hospitalization: 6.4 g/dL. Will continue to monitor and treat/transfuse as appropriate.      # Moderate Malnutrition: based on nutrition assessment      # Financial/Environmental Concerns: none          PT/OT: ordered  Diet: Snacks/Supplements Adult: Expedite Bottle; Between Meals  Combination Diet Gluten Free Diet; 2 gm NA Diet    DVT Prophylaxis: none.  Encourage patient out of bed, up and sitting with meals.  Alvarez Catheter: Not present  Lines: PRESENT      PICC 24 Double Lumen Left Brachial vein medial access-Site Assessment: WDL    Cardiac Monitoring: None  Code Status: Full Code    Medically Ready for Discharge: >2days pending LIMA,     Bethanie Staley MD  Hospitalist Service  [facility]    SH: Reviewed with patient her home situation.  She lives with brother and son, sister will stay with her on discharge.  Mobility per slide board to wheelchair, she is AKA.  Receives home care/wound care 3 times per week.  She states she was started on OT/PT.  She is followed by PCP [place] [facility] and will start nephrology follow-up on discharge.      ROS: Complete ROS negative except as above.       Data reviewed today: I reviewed all new labs and imaging results over  the last 24 hours.    Physical Exam   Temp: 98.3  F (36.8  C) Temp src: Oral BP: (!) 149/65 Pulse: 64   Resp: 16 SpO2: 92 % O2 Device: None (Room air)    Vitals:    07/03/24 0650 07/06/24 0544 07/07/24 0700   Weight: 52.3 kg (115 lb 4.8 oz) 50.7 kg (111 lb 12.4 oz) 50 kg (110 lb 3.7 oz)     General/Constitutional: NAD, awake, calm, cooperative  Chest/Respiratory: Respirations nonlabored room air  Cardiovascular:  regular, no murmur appreciated.  LE edema none, status post right AKA  Gastrointestinal/Abdomen:  soft, nontender, PureWick present, clear yellow urine.    Neuro.  Gross motor tested, nonfocal, R AKA  Psych oriented, affect: Calm.    Medications   Current Facility-Administered Medications   Medication Dose Route Frequency Provider Last Rate Last Admin    No lozenges or gum should be given while patient on BIPAP/AVAPS/AVAPS AE   Does not apply Continuous PRN Devin Ravi MD        Patient may continue current oral medications   Does not apply Continuous PRN Devin Ravi MD         Current Facility-Administered Medications   Medication Dose Route Frequency Provider Last Rate Last Admin    amLODIPine (NORVASC) tablet 10 mg  10 mg Oral Daily Devin Ravi MD   10 mg at 07/07/24 0928    bumetanide (BUMEX) tablet 1 mg  1 mg Oral TID Bethanie Staley MD   1 mg at 07/07/24 1401    calcium acetate (PHOSLO) capsule 667 mg  667 mg Oral TID w/meals Hardy Falcon MD   667 mg at 07/07/24 1404    carvedilol (COREG) tablet 12.5 mg  12.5 mg Oral BID w/meals Devin Ravi MD   12.5 mg at 07/07/24 0927    clopidogrel (PLAVIX) tablet 75 mg  75 mg Oral Daily Devin Ravi MD   75 mg at 07/07/24 0927    famotidine (PEPCID) tablet 10 mg  10 mg Oral Daily Devin Ravi MD   10 mg at 07/07/24 0928    fluticasone-vilanterol (BREO ELLIPTA) 200-25 MCG/ACT inhaler 1 puff  1 puff Inhalation Daily Bruce Ulloa MD   1 puff at 07/07/24 9006    hydrALAZINE (APRESOLINE) tablet 10 mg   10 mg Oral Q8H [facility] Bethanie Staley MD   10 mg at 07/07/24 1401    nicotine (NICODERM CQ) 14 MG/24HR 24 hr patch 1 patch  1 patch Transdermal Daily Bruce Ulloa MD   1 patch at 07/07/24 0930    polyethylene glycol (MIRALAX) Packet 17 g  17 g Oral Daily Bruce Ulloa MD   17 g at 07/05/24 1009    rosuvastatin (CRESTOR) tablet 10 mg  10 mg Oral At Bedtime Bethanie Staley MD   10 mg at 07/06/24 2214    saccharomyces boulardii (FLORASTOR) capsule 250 mg  250 mg Oral BID Bruce Ulloa MD   250 mg at 07/07/24 0928    sodium chloride (PF) 0.9% PF flush 10 mL  10 mL Intracatheter Q8H Bethanie Staley MD   10 mL at 07/07/24 0942       Data   Recent Labs   Lab 07/07/24  0641 07/06/24  0540 07/05/24  0605 07/04/24  0621   WBC 4.2 4.6 4.5 4.5   HGB 8.1* 8.9* 8.0* 8.0*   MCV 95 94 94 93    165 144* 123*   * 128* 129* 131*   POTASSIUM 4.3 4.2 4.3 4.8   CHLORIDE 91* 90* 91* 93*   CO2 32* 30* 29 28   BUN 58.7* 60.5* 61.0* 61.1*   CR 2.01* 1.98* 2.09* 2.23*   ANIONGAP 6* 8 9 10   SONIA 8.1* 8.1* 8.0* 7.9*   GLC 74 78 73 74   ALBUMIN 2.6* 2.8* 2.6* 2.5*   PROTTOTAL  --   --   --  4.7*   BILITOTAL  --   --   --  <0.2   ALKPHOS  --   --   --  51   ALT  --   --   --  15   AST  --   --   --  13       Imaging:

## 2024-07-07 NOTE — PLAN OF CARE
Goal Outcome Evaluation:  Date/Time: 07/06-07/07/24 [id number]    Diagnosis: Pleural Effusion, LIMA     Mental Status: A & O x 4  Activity/dangle: Can be up to chair with sliding board but in bed today  Diet: 2g NA diet, gluten free  Pain: C/O of pain 8/10 pain, oral dilaudid given   Alvarez/Voiding: Purewick  Tele/Restraints/Iso: NA  02/LDA:PICC line in place, O2-1L  D/C Date:Pending  Other Info: repositioned, dressing on R stump and coccyx are CDI. Dressing changes daily. Some pitting Edema on left foot

## 2024-07-08 VITALS
HEART RATE: 56 BPM | DIASTOLIC BLOOD PRESSURE: 69 MMHG | BODY MASS INDEX: 20.83 KG/M2 | TEMPERATURE: 97.8 F | WEIGHT: 110.23 LBS | SYSTOLIC BLOOD PRESSURE: 144 MMHG | OXYGEN SATURATION: 94 % | RESPIRATION RATE: 16 BRPM

## 2024-07-08 PROBLEM — N18.4 CKD (CHRONIC KIDNEY DISEASE) STAGE 4, GFR 15-29 ML/MIN (H): Status: ACTIVE | Noted: 2024-07-08

## 2024-07-08 LAB
ALBUMIN SERPL BCG-MCNC: 2.6 G/DL (ref 3.5–5.2)
ANION GAP SERPL CALCULATED.3IONS-SCNC: 6 MMOL/L (ref 7–15)
BUN SERPL-MCNC: 57.7 MG/DL (ref 8–23)
CALCIUM SERPL-MCNC: 8.2 MG/DL (ref 8.8–10.2)
CHLORIDE SERPL-SCNC: 94 MMOL/L (ref 98–107)
CREAT SERPL-MCNC: 1.93 MG/DL (ref 0.51–0.95)
DEPRECATED HCO3 PLAS-SCNC: 32 MMOL/L (ref 22–29)
EGFRCR SERPLBLD CKD-EPI 2021: 28 ML/MIN/1.73M2
ERYTHROCYTE [DISTWIDTH] IN BLOOD BY AUTOMATED COUNT: 13.5 % (ref 10–15)
GLUCOSE SERPL-MCNC: 77 MG/DL (ref 70–99)
HCT VFR BLD AUTO: 26 % (ref 35–47)
HGB BLD-MCNC: 8.5 G/DL (ref 11.7–15.7)
MCH RBC QN AUTO: 30.8 PG (ref 26.5–33)
MCHC RBC AUTO-ENTMCNC: 32.7 G/DL (ref 31.5–36.5)
MCV RBC AUTO: 94 FL (ref 78–100)
PHOSPHATE SERPL-MCNC: 4.4 MG/DL (ref 2.5–4.5)
PLATELET # BLD AUTO: 173 10E3/UL (ref 150–450)
POTASSIUM SERPL-SCNC: 4.2 MMOL/L (ref 3.4–5.3)
RBC # BLD AUTO: 2.76 10E6/UL (ref 3.8–5.2)
SODIUM SERPL-SCNC: 132 MMOL/L (ref 135–145)
WBC # BLD AUTO: 4.4 10E3/UL (ref 4–11)

## 2024-07-08 PROCEDURE — 250N000013 HC RX MED GY IP 250 OP 250 PS 637: Performed by: INTERNAL MEDICINE

## 2024-07-08 PROCEDURE — 80069 RENAL FUNCTION PANEL: CPT | Performed by: INTERNAL MEDICINE

## 2024-07-08 PROCEDURE — 250N000011 HC RX IP 250 OP 636: Performed by: HOSPITALIST

## 2024-07-08 PROCEDURE — 85048 AUTOMATED LEUKOCYTE COUNT: CPT | Performed by: INTERNAL MEDICINE

## 2024-07-08 PROCEDURE — G0463 HOSPITAL OUTPT CLINIC VISIT: HCPCS

## 2024-07-08 PROCEDURE — 99239 HOSP IP/OBS DSCHRG MGMT >30: CPT | Performed by: HOSPITALIST

## 2024-07-08 PROCEDURE — 250N000013 HC RX MED GY IP 250 OP 250 PS 637: Performed by: HOSPITALIST

## 2024-07-08 RX ORDER — FAMOTIDINE 10 MG
10 TABLET ORAL DAILY
Qty: 30 TABLET | Refills: 0 | Status: SHIPPED | OUTPATIENT
Start: 2024-07-09 | End: 2024-08-08

## 2024-07-08 RX ORDER — CALCIUM ACETATE 667 MG/1
667 CAPSULE ORAL
Qty: 42 CAPSULE | Refills: 0 | Status: SHIPPED | OUTPATIENT
Start: 2024-07-08 | End: 2024-07-22

## 2024-07-08 RX ORDER — BUMETANIDE 1 MG/1
1 TABLET ORAL
Qty: 42 TABLET | Refills: 0 | Status: ON HOLD | OUTPATIENT
Start: 2024-07-08 | End: 2024-08-07

## 2024-07-08 RX ORDER — HYDRALAZINE HYDROCHLORIDE 10 MG/1
10 TABLET, FILM COATED ORAL 3 TIMES DAILY
Status: ON HOLD
Start: 2024-07-08 | End: 2024-08-07

## 2024-07-08 RX ADMIN — NICOTINE 1 PATCH: 14 PATCH, EXTENDED RELEASE TRANSDERMAL at 08:15

## 2024-07-08 RX ADMIN — BUMETANIDE 1 MG: 1 TABLET ORAL at 12:06

## 2024-07-08 RX ADMIN — CARVEDILOL 12.5 MG: 12.5 TABLET, FILM COATED ORAL at 08:12

## 2024-07-08 RX ADMIN — ONDANSETRON 4 MG: 4 TABLET, ORALLY DISINTEGRATING ORAL at 09:11

## 2024-07-08 RX ADMIN — CLOPIDOGREL BISULFATE 75 MG: 75 TABLET ORAL at 08:12

## 2024-07-08 RX ADMIN — CALCIUM ACETATE 667 MG: 667 CAPSULE ORAL at 12:06

## 2024-07-08 RX ADMIN — BUMETANIDE 1 MG: 1 TABLET ORAL at 08:12

## 2024-07-08 RX ADMIN — AMLODIPINE BESYLATE 10 MG: 10 TABLET ORAL at 08:12

## 2024-07-08 RX ADMIN — HYDRALAZINE HYDROCHLORIDE 10 MG: 10 TABLET ORAL at 15:05

## 2024-07-08 RX ADMIN — CALCIUM ACETATE 667 MG: 667 CAPSULE ORAL at 08:12

## 2024-07-08 RX ADMIN — Medication 250 MG: at 08:11

## 2024-07-08 RX ADMIN — HYDRALAZINE HYDROCHLORIDE 10 MG: 10 TABLET ORAL at 06:05

## 2024-07-08 RX ADMIN — FLUTICASONE FUROATE AND VILANTEROL TRIFENATATE 1 PUFF: 200; 25 POWDER RESPIRATORY (INHALATION) at 09:13

## 2024-07-08 RX ADMIN — FAMOTIDINE 10 MG: 10 TABLET, FILM COATED ORAL at 08:12

## 2024-07-08 ASSESSMENT — ACTIVITIES OF DAILY LIVING (ADL)
ADLS_ACUITY_SCORE: 45

## 2024-07-08 NOTE — PROGRESS NOTES
Alert and oriented x 4. VSS, on 02 at 0.5 lpm/NC. CMS intact. Dressing to R AKA CDI, mepilex on coccyx intact.  Voiding adeqautely, purewick in place. Able to shift weight independently in bed. Pain managed with PRN Dilaudid. Discharge TBD.

## 2024-07-08 NOTE — DISCHARGE SUMMARY
[facility]  Hospitalist Discharge Summary      Date of Admission:  6/21/2024  Date of Discharge:  7/8/2024  Discharging Provider: Artie Grider MD  Discharge Service: Hospitalist Service    Discharge Diagnoses   Left mainstem bronchial obstruction due to mucous plugging s/p bronchoscopy 6/26/2024  Acute hypoxic respiratory failure due to above, improved  Large left pleural effusion, transudative  S/p left thoracentesis 800mL 6/21, 1200mL 6/22  S/p left chest tube 6/23/2024  Right moderate pleural effusion  Anuric renal failure on CKD, requiring brief dialysis, improved  Recent acute renal failure in May 2024 needing hemodialysis  Severe hyperkalemia, improved  Hyponatremia  Metabolic acidosis  Diastolic congestive heart failure exacerbation  Elevated troponin suspect due to volume overload  Systolic murmur  Anemia of chronic disease, acute anemia improved  HTN  HL  Hx of CAD  History of peripheral vascular disease status above-knee amputation on the right with wound dressing status washout on 5/24  Coccygeal pressure injury previously stage III  Neuropathic pain  COPD  GERD  Hx of B cell lymphoma  Tobacco use  Ongoing stressors - recent death of spouse June 2024      Clinically Significant Risk Factors     # Moderate Malnutrition: based on nutrition assessment      Follow-ups Needed After Discharge   Follow-up Appointments     Follow Up ([facility])      Follow up in nephrology is as follows:    Dr. Vaz  [facility] - nephology  [address] [place] [address]  [phone]    Wednesday July 17, 2024  3:00 PM        Follow-up and recommended labs and tests       PCP for hospitalization follow up with CBC in the next 7-10 days (ok for   RN to draw blood at home with results to PCP)  Nephrology as scheduled - with BMP   Primary hem/onc as previously planned            Unresulted Labs Ordered in the Past 30 Days of this Admission       Date and Time Order Name Status  Description    6/26/2024 10:43 AM Fungal or Yeast Culture Routine Preliminary     6/26/2024 10:43 AM Acid-Fast Bacilli Culture and Stain In process         These results will be followed up by Pulmology    Discharge Disposition   Discharged to home  Condition at discharge: Stable    Hospital Course   Shirley Hendricks is a 63 year old female with PMH CAD, MI, peripheral vascular disease status angioplasty and right AKA with wound vac, DLE, COPD, CMT disease, tobacco use, CKD stage III, GERD, hypertension, history of B-cell lymphoma, hyperlipidemia, depression, anxiety, who was admitted on 6/21/2024 with acute hypoxic respiratory failure due to a large left pleural effusion, hyperkalemia, and hyponatremia.                Patient recently admitted to [facility] 3/29-4/22/24 with right limb ischemia in setting of severe PAD ultimately requiring right AKA with complicated post op course (including LIMA requiring HD) after which she was discharged to  ARU. She was readmitted to [facility] 5/15-5/29/24 with stump eschars requiring surgical debridement and wound VAC placement.                This admission, patient underwent left thoracentesis with 800mL and 1200mL fluid removed. Severe hyperkalemia improved with Lokelma. Patient had been anuric since admission, started on dialysis on 6/23 now with some renal recovery. Chest tube placed 6/23, CT on 6/24 shows ongoing left bronchus obstruction, felt to be mucous plugging per Pulm. Improved after dialysis 6/24, now making urine, O2 needs down to 2-3L.                S/p bronchoscopy 6/26 with mucous plugging removed. Chest tube to be removed 6/26. Lung re-expansion noted 6/26. Renal function stabilizing 6/28.                  Left mainstem bronchial obstruction due to mucous plugging s/p bronchoscopy 6/26/2024  Acute hypoxic respiratory failure due to above, improved  Large left pleural effusion, transudative  S/p left thoracentesis 800mL 6/21, 1200mL 6/22  S/p left  "chest tube 6/23/2024  Right moderate pleural effusion  Patient with increasing shortness of breath for 1-2 weeks PTA. CXR 6/21 in ED reveals complete opacification of the left hemithorax. CT chest-large left pleural effusion with complete collapse of the left upper and left lower and occlusion of left mainstem bronchus and a moderate size right pleural effusion. Patient underwent thoracentesis 800mL on 6/21, fluid appears transudative. Patient appears markedly volume overloaded. In the ED she initially required 2L O2 but was transitioned to HFNC overnight 6/22 to 6/23 during RRT.  Reports symptomatic improvement with thoracentesis 6/21 and 6/22, but continues to require 10+L O2 or HFNC on 6/23. Discussed with Pulm on 6/23, concern remains left mainstem bronchus obstruction but need to rule out recurrent pleural effusion as cause, so chest tube placed on 6/23. CT chest on 6/24 shows persistent persistent left bronchial obstruction, per Pulm likely mucous plugging.  Bronchoscopy done 6/26 with aspiration of mucous plugs. Chest tube removed 6/26. Patient with chest pain due to lung re-expansion 6/26, CXR 6/27 shows improving lung expansion.   It appears that patient's major turnaround started following bronchoscopy on 6/26.    Breathing continues to improve on 6/30. Weaning oxygen.  resp cx with normal nadine.  No fungal elements seen on KOH.  - Pleural fluid cytology: 7/3/2024.  In Epic, cytology \"negative for malignancy, no fungal or pneumocystis organisms viral cystopathic effect negative.\"  Currently denies dyspnea, hypoxia, cough, sputum.,  During the day on room air, today with O2 in the morning, wean as able.  Minimal activity out of bed, encourage out of bed as able. Pulm to follow further bronch testing results.  - stable on RA, afebrile, nontoxic        Anuric renal failure on CKD, requiring brief dialysis, improved  Recent acute renal failure in May 2024 needing hemodialysis  Severe hyperkalemia, " improved  Hyponatremia  Metabolic acidosis  Recent baseline creatinine has been fluctuating between 1.9-2.8. On admission Creatinine is 3.07, potassium is 7.5, sodium of 128 with bicarb of 17.   In the ER patient did receive albuterol, 2 g calcium gluconate, insulin, Kayexalate 10 g and 40 IV Lasix and 1 L IV fluid bolus. EKG showed sinus bradycardia with prolonged QT. Potassium has been elevated to 7.1 since admission, improved to 5.3 on 6/23 with Lokelma.  Patient has been anuric since admission, no urine output. Etiology of anuric renal failure likely related to left mainstem bronchus obstruction. Due to ongoing anuria, TDC placed 6/23 and dialyzed 6/23 and 6/24.  Started to making urine 6/25, creatinine has stabilized on 6/28 and now fluctuating 2.7-3  7/7/2024.  See intermittent nephrology notes.  Received intermittent HD, HD catheter now pulled.  Was on IV Bumex with IV albumin, on 7/4/2024 changed to oral Bumex 2 mg 3 times daily, makes urine.  Today CR 2.01,, CR maximum 3.74.  Now contraction alkalosis with CO2 increased now at 32, small bump in CR, therefore we will decrease Bumex from 2 to 1 mg 3 times daily, monitor CO2, CR, sodium.  If further decrease in sodium may need to consider restart of fluid restriction.  BMP in AM 7/8 shows improvement.    - nephro ok with discharge and has follow up with BMP arranged in clinic   - 7/8 creat 1.9, sodium 132 (was upper 120s recently)   - nephro ok with continuing decreased bumex dosing        Diastolic congestive heart failure exacerbation  Elevated troponin suspect due to volume overload  Patient presents with large left pleural effusion, BNP  elevated to 11.9k. Note history of stress cardiomyopathy in Oct 2023 with EF 30-35% which then resolved on echo Nov 2023.                 Patient does not have any significant chest discomfort and troponin are 174 and 177 and trending down to 156. Echo 6/22 shows Ef 60-65%, mid anterior and lateral severe hypokinessis,  distal inferior hypokinesis, normal RV function. Suspect large component of renal failure contributing. Felt less likely to be CHF as this was likely due to renal failure.   - Cardiology consult   - Nephrology, see above  - Tele, weights, I&O  - Diuresis per nephro/cards.  See above     Systolic murmur: Noted systolic murmur 6/23, limited echo 6/24 shows no change from 6/22, likely flow murmur. Murmur improved 6/28.     Anemia of chronic disease  Acute anemia 6/23 likely spurious  Recent baseline hemoglobin has been fluctuating and has been between 8.4-10.1.   Hemoglobin 8.4 on 6/22-->6.4 on 6/23. Patient denies blood loss. Bilirubin is not elevated makes hemolysis less likely. Patient was given 1u pRBC, recheck Hgb was 9.5 via skin draw. The Hgb of 6.4 likely was spurious related to an inaccurate PICC line draw, likely Hgb was 8.5-->9.5 with 1u pRBC.  - [facility] consult appreciated, signed off  - Daily Hgb checks, see above     Hypertension  Hyperlipidemia  History of coronary disease with an MI in 2019  Left heart cath on 10/4/2023 showed-completely occluded D1 with left to left collaterals from distal LAD to D1, moderate CAD involving proximal to mid RCA not significant by IFR and moderate coronary disease please involving distal small caliber RPL and distal circumflex artery. Last echo 11/23 showed EF of 55 to 60% with normal RV and moderate trileaflet aortic sclerosis  - Resume PTA Coreg 6/28  - Continue amlodipine 10mg daily  - Prior hospitalist held patient's rosuvastatin, unclear reason.  Subsequently prior hospitalist discussed with patient who denies myalgia, normal LFTs.  In patient with severe PAD restarted rosuvastatin, close monitor.  LFTs in a.m. 7/4/2024, no myalgias, LFTs WNL.  - continued regimen as below  - outpatient team to continue follow up     History of peripheral vascular disease status above-knee amputation on the right with wound dressing status washout on 5/24  Coccygeal pressure injury  previously stage III  Neuropathic pain  Patient has followed with vascular surgery and has noticed during last hospitalization in May 2024 she underwent washout with wound VAC placement and was discharged on Plavix and Crestor. On exam the amputation stump is currently wrapped, no obvious discharge from what I can see on exam   - Continue with PTA Plavix 75 mg daily  - wound vac now removed, vascular surgery consult appreciated  - Pain control with hydromorphone oral PRN (avoid oxycodone as it is renally cleared)  - See above related to rosuvastatin.  No myalgias, no increased LFTs.  - continue home wound cares per [facility] RN recs     COPD not in exacerbation  - On exam has no wheezing and continue with as needed symbicort     GERD: Famotidine BID     History of B-cell lymphoma  - Follows with [place] [facility]  - Pending cytology from thoracentesis  - see above/below     Tobacco use  - Continue with nicotine patch     Ongoing stressors  Patient had a recent AKA in 2024 with complicated post op course 2024, and reports   2024.    Consultations This Hospital Stay   VASCULAR ACCESS ADULT IP CONSULT  INTERVENTIONAL RADIOLOGY ADULT/PEDS IP CONSULT  CARDIOLOGY IP CONSULT  PULMONARY IP CONSULT  NEPHROLOGY IP CONSULT  VASCULAR ACCESS ADULT IP CONSULT  WOUND OSTOMY CONTINENCE NURSE  IP CONSULT  GASTROENTEROLOGY IP CONSULT  INTERVENTIONAL RADIOLOGY ADULT/PEDS IP CONSULT  INTERVENTIONAL RADIOLOGY ADULT/PEDS IP CONSULT  CARE MANAGEMENT / SOCIAL WORK IP CONSULT  THORACIC SURGERY IP CONSULT  VASCULAR ACCESS ADULT IP CONSULT  INTERVENTIONAL RADIOLOGY ADULT/PEDS IP CONSULT  WOUND OSTOMY CONTINENCE NURSE  IP CONSULT  PHYSICAL THERAPY ADULT IP CONSULT  OCCUPATIONAL THERAPY ADULT IP CONSULT  SPIRITUAL HEALTH SERVICES IP CONSULT  VASCULAR ACCESS ADULT IP CONSULT  SPIRITUAL HEALTH SERVICES IP CONSULT  INTERVENTIONAL RADIOLOGY ADULT/PEDS IP CONSULT  VASCULAR ACCESS ADULT IP CONSULT    Code Status   Full  Code    Time Spent on this Encounter   I, Artie Grider MD, personally saw the patient today and spent greater than 30 minutes discharging this patient.       Artie Grider MD  [facility]  [address] [place] [id number]  Phone: [phone]  Fax: [phone]  ______________________________________________________________________    Physical Exam   Vital Signs: Temp: 97.8  F (36.6  C) Temp src: Oral BP: (!) 153/66 Pulse: 56   Resp: 16 SpO2: 94 % O2 Device: None (Room air) Oxygen Delivery: 1/2 LPM  Weight: 110 lbs 3.68 oz    Gen: NAD, pleasant  HEENT: EOMI, MMM  Resp: no focal crackles,  no wheezes, no increased work of resp  CV: S1S2 heard, reg rhythm, reg rate  Abdo: soft, nontender, nondistended, bowel sounds present  Ext: calves nontender, well perfused, R aka stump dressed  Neuro: aa, conversant, moving ext (R AKA noted), CN grossly intact, no facial asymmetry         Primary Care Physician   Vasquez Benoit    Discharge Orders      Medication Therapy Management Referral      Follow-Up with Cardiology BRANDON      Primary Care - Care Coordination Referral      Follow Up ([facility])    Follow up in nephrology is as follows:    Dr. Vaz  [facility] - nephology  [address] [place] [address]  [phone]  3:00 PM     Reason for your hospital stay    Shortness of breath and electrolyte abnormalities     Follow-up and recommended labs and tests     PCP for hospitalization follow up with CBC in the next 7-10 days (ok for RN to draw blood at home with results to PCP)  Nephrology as scheduled - with [facility]   Primary hem/onc as previously planned     Activity    Your activity upon discharge: activity as tolerated     Wound care and dressings    Coccyx wound care: Daily and prn for loose/soiled dressin. Cleanse with wound cleanser. Pat dry.  2. Swab periwound area with no-sting skin barrier film, let dry.  3. Cover with Mepilex  "Sacral.   4. PIP measures.  Use ceiling lift to move patient in bed whenever possible, to minimize friction and shear to coccyx/sacrum.           Wound care  Start:  06/24/24 1404,   EVERY OTHER DAY,   Routine     Comments: Location: right stump  Care: provided every other day by primary RN  1. Cleanse every other day with wound cleanser and 4x4\" gauze, pat dry  2. Apply Criticiad paste to wound edges (no need to scrub off all white paste, reapply more)  3. Cover wound base with Aquacel Ag Advantage    4. Add ABD as needed for drainage  5. Secure with stockinette or ace wrap or EdemaWear     Discharge Instructions    Continue medications and wound cares as below. Follow up with your outpatient care team.     Resume Home Care Services    Please continue home care RN and OT     Diet    Follow this diet upon discharge: Orders Placed This Encounter      Snacks/Supplements Adult: Expedite Bottle; Between Meals      Combination Diet Gluten Free Diet; 2 gm NA Diet       Significant Results and Procedures   Most Recent 3 CBC's:  Recent Labs   Lab Test 07/08/24  0610 07/07/24  0641 07/06/24  0540   WBC 4.4 4.2 4.6   HGB 8.5* 8.1* 8.9*   MCV 94 95 94    165 165     Most Recent 3 BMP's:  Recent Labs   Lab Test 07/08/24  0610 07/07/24  0641 07/06/24  0540   * 129* 128*   POTASSIUM 4.2 4.3 4.2   CHLORIDE 94* 91* 90*   CO2 32* 32* 30*   BUN 57.7* 58.7* 60.5*   CR 1.93* 2.01* 1.98*   ANIONGAP 6* 6* 8   SONIA 8.2* 8.1* 8.1*   GLC 77 74 78     Most Recent 2 LFT's:  Recent Labs   Lab Test 07/04/24  0621 06/23/24  0609   AST 13 15   ALT 15 18   ALKPHOS 51 43   BILITOTAL <0.2 <0.2     7-Day Micro Results       No results found for the last 168 hours.          Most Recent Urinalysis:  Recent Labs   Lab Test 05/03/24  1319 02/04/19  0935   COLOR Orange* Yellow   APPEARANCE Cloudy* Clear   URINEGLC Negative Negative   URINEBILI Negative Negative   URINEKETONE Negative Negative   SG 1.018 1.010   UBLD Small* Negative   URINEPH " 6.0 6.0   PROTEIN 200* Negative   UROBILINOGEN  --  0.2   NITRITE Negative Negative   LEUKEST Large* Negative   RBCU 17* O - 2   WBCU >182* 0 - 5   ,   Results for orders placed or performed during the hospital encounter of 06/21/24   Chest CT w/o contrast    Narrative    EXAM: CT CHEST W/O CONTRAST  LOCATION: [facility]  DATE: 6/21/2024    INDICATION: Left lung white out on recent chest x-ray, and SOB  COMPARISON: Chest x-ray 6/21/2024 and CT abdomen and pelvis 05/02/2024  TECHNIQUE: CT chest without IV contrast. Multiplanar reformats were obtained. Dose reduction techniques were used.  CONTRAST: None.    FINDINGS:   LUNGS AND PLEURA: A large left pleural effusion has developed since the prior studies, with complete compressive atelectasis/collapse involving the left upper and lower lobes. Moderate size right pleural effusion and subsegmental atelectasis involving a   moderate portion of the right lower lobe. Minimal scarring or thickening right upper lobe.    MEDIASTINUM/AXILLAE: The distal left mainstem bronchus is occluded No lymphadenopathy. No thoracic aortic aneurysm. Atherosclerotic disease thoracic aorta. Plaque at the origins of the great vessels arising from the arch.    CORONARY ARTERY CALCIFICATION: Severe.    UPPER ABDOMEN: Severe atherosclerotic disease abdominal aorta and branch vessels, incompletely evaluated. Cholecystectomy.    MUSCULOSKELETAL: Edematous changes subcutaneous tissues. Hypertrophic changes thoracic spine.      Impression    IMPRESSION:   1.  Large left pleural effusion with complete collapse of the left upper and left lower lobes. This would be amenable for ultrasound-guided thoracentesis. There is also occlusion of the left mainstem bronchus. Pulmonary consultation recommended.  2.  Moderate-sized right pleural effusion and subsegmental atelectasis right lower lobe.  3.  Diffuse edema subcutaneous tissues.  4.  Severe atherosclerotic disease.     US  Thoracentesis    Narrative    [facility]  DATE: 6/21/2024    PROCEDURE: IMAGING GUIDED LEFT THORACENTESIS    INTERVENTIONAL RADIOLOGIST: Edelmira Hurt M.D.    INDICATION: Left pleural effusion.    CONSENT: The risks, benefits and alternatives of an imaging guided  thoracentesis were discussed with the patient  in detail. All  questions were answered. Informed consent was given to proceed with  the procedure.    MODERATE SEDATION: None.    COMPLICATIONS: No immediate complications.    PROCEDURE:    A limited ultrasound was performed for localization purposes.    Using sterile technique 10 mL of lidocaine was infused into the local  soft tissues. Under direct ultrasound guidance a 5 Brune[place] catheter was  inserted into the pleural effusion.    A total of 800 mL of clear yellow pleural fluid was removed and sent  to the lab if diagnostic analysis was requested.    FINDINGS:  The initial ultrasound shows a pleural effusion.    Images obtained during catheter placement show the access needle with  tip in the pleural fluid.      Impression    IMPRESSION:    Successful ultrasound-guided thoracentesis, as discussed above.      EDELMIRA HURT MD         SYSTEM ID:  [id number]   XR Chest Port 1 View    Narrative    EXAM: CHEST SINGLE VIEW PORTABLE  LOCATION: [facility]  DATE/TIME: 6/22/2024 3:22 AM CDT    INDICATION: Dyspnea.  COMPARISON: 6/21/2004 at 0928 hours.      Impression    IMPRESSION:   1. Near-complete opacification of the left hemithorax. There has been mildly improved aeration of the mid left lung since the comparison study.  2. No other significant change.  3. The right lung is clear.    US Thoracentesis    Narrative    ULTRASOUND GUIDED THORACENTESIS  6/22/2024 2:05 PM     HISTORY: large left pleural effusion, shortness of breath    FINDINGS: Limited ultrasound was performed to evaluate for the  presence and best approach for drainage of a pleural effusion. An  image is archived.  Written and oral informed consent was obtained. A  pause for the cause procedure to verify the correct patient and  correct procedure.     The skin overlying the left chest posteriorly was prepped and draped  in the usual sterile fashion. The subcutaneous tissues were  anesthetized with 1% lidocaine. Under direct ultrasound guidance a  catheter was advanced into the pleural space and 1200 mL of  dario  colored fluid was drained. The catheter was removed and a sterile  dressing was applied.     Patient was monitored by nurse under my direct supervision throughout  the exam. Ultrasound images were permanently stored.  There were no  immediate complications. Patient left the ultrasound suite in  satisfactory condition.      Impression    IMPRESSION: Technically successful thoracentesis without immediate  complications.    GHASSAN COLLAZO DO         SYSTEM ID:  [id number]   XR Chest Port 1 View    Narrative    EXAM: XR CHEST PORT 1 VIEW  LOCATION: [facility]  DATE: 6/23/2024    INDICATION: hypoxia  COMPARISON: 6/22/2024      Impression    IMPRESSION: Left PICC with the tip in the right atrium; if the desired location is the caval atrial junction, consider retraction 6.0 cm. Slightly improved aeration in the left upper lung. Otherwise stable near complete opacification of the left   hemithorax. Increasing small right basilar consolidation. Small right pleural effusion. The cardiac silhouette is obscured.   IR CVC Tunnel Placement > 5 Yrs of Age    Narrative    IR CVC TUNNEL PLACEMENT > 5 YRS OF AGE   6/23/2024 12:57 PM     CLINICAL HISTORY/INDICATION: end stage renal failure requiring  dialysis. Increased o2 demands, requiring dialysis    PROCEDURES PERFORMED: Tunneled catheter placement    MODERATE SEDATION: Versed 0.5 mg IV; Fentanyl 25 mcg IV. During the  time out, immediately prior to the administration of medications, the  patient was reassessed for adequacy to receive conscious sedation.    Under physician supervision, Versed and fentanyl were administered for  moderate sedation. Pulse oximetry, heart rate and blood pressure were  continuously monitored by an independent trained observer. The  physician spent 37 minutes of face-to-face sedation time with the  patient.    CONTRAST: None    FLUORO: 0.1 minutes    IMAGES/DOSE: 0.33 mGy    CONSENT: Following a discussion of the risks, benefits, indications  and alternatives to treatment, appropriate informed consent was  obtained.      TIMEOUT: A timeout was performed per universal protocol policy to  ensure correct patient, site, and procedure to be performed.     CENTRAL LINE STATEMENT: The patient was brought to the interventional  radiology suite and placed supine on the table. The patients right  neck and anterior chest region were prepped and draped in a sterile  fashion for tunneled line placement.  The procedure was performed  using maximal Sterile Barrier Technique Utilized: Cap AND mask AND  sterile gown AND sterile gloves AND sterile full body drape AND hand  hygiene AND skin preparation 2% chlorhexidine for cutaneous antisepsis  (or acceptable alternative antiseptics).  Sterile Ultrasound Technique  Utilized ?Sterile gel AND sterile probe covers.     PROCEDURE AND FINDINGS:   This central line was performed in accordance with the central line  bundle with the exception of vein selection. Following a discussion of  the risks, benefits, indications and alternatives to treatment,  appropriate informed consent was obtained.  The patient was brought to  the interventional radiology suite and placed supine on the table. The  patients right neck and anterior chest region were prepped and draped  in a sterile fashion for tunneled line placement.  A timeout was  performed per universal protocol policy to ensure correct patient,  site, and procedure to be performed.      Ultrasound was utilized to evaluate the veins of the right neck and a  permanent  record of the image was obtained which demonstrates the  internal jugular vein to be patent. Under direct ultrasound guidance,  1% Lidocaine was infiltrated and access was gained easily into the low  lateral aspect of the internal jugular vein utilizing micropuncture  technique. The wire was advanced easily under fluoroscopic guidance  and the tract was serially dilated and a peel away sheath placed. A  subcutaneous tunnel was then created over the anterior/superior chest  wall using local anesthesia and blunt dissection. Under fluoroscopic  guidance, a 14.5 Togolese dual lumen 19 cm cuffed catheter was then  pulled through the tunnel and was advanced through the peel away  sheath. A final placement film demonstrates the catheter tip at the  cavoatrial junction with the patient supine.  All of the ports flush  and aspirate without difficulty following placement.  The catheter was  secured in position. The small incision at the base of the neck was  closed uneventfully.  A sterile dressing was applied.     Throughout the procedure, the patient was monitored by a radiology  nurse for cardiac rhythm which remained stable. The patient tolerated  the procedure well and left the interventional radiology suite in  stable condition.      Impression    IMPRESSION:  Uneventful placement of a right internal jugular vein 14.5 Togolese dual  lumen 19 cm tunneled palindrome catheter, as described using  ultrasound and fluoroscopic guidance. This catheter is indicated to be  use for hemodialysis or pheresis.    GHASSAN COLLAZO DO         SYSTEM ID:  [id number]   IR Chest Tube Place Non Tunneled Left    Narrative    PROCEDURE:  Ultrasound guided left sided  10.2 Togolese chest tube placement    DATE: 6/23/2024    MEDICATIONS: Please see dictation for tunneled catheter from same day  for sedation medications    COMPLICATIONS: None    HISTORY: Recurrent left pleural effusion despite to thoracentesis.  Persistent significant shortness of  "breath requiring BiPAP    PROCEDURE AND FINDINGS:  Following a discussion of the risks, benefits, indications and  alternatives to treatment, appropriate informed consent was obtained.   The patient was brought to the interventional radiology suite and  placed upright on the table. The left hemithorax was prepped and  draped in the usual sterile fashion. A timeout was performed per  universal protocol policy to confirm the correct patient, site and  procedure to be performed.    A preliminary ultrasound was performed to access for the appropriate  site to access the pleural effusion. These images reveal a large left  pleural effusion and an ultrasound image was archived.      Once an appropriate site for chest tube placement was localized, the  overlying skin was anesthetized with 1% Lidocaine. Under direct  ultrasound guidance, a 5 Kittitian Yueh needle was advanced into the  pleural space via an intercostal approach. The catheter was advanced  off of the needle. A 0.035\" guidewire was advanced through the  micropuncture sheath and the tract was serially dilated.  A 10.2  Kittitian locking loop chest tube was placed with the tip coiled in the  pleural space. The catheter was sutured to the skin using 2-0 silk  suture. A sterile dressing was applied. Approximately 900 mL of serous  fluid was removed. The chest tube was connected to a Pleuravac device  in the interventional suite.     Throughout the procedure, the patient was monitored by a radiology  nurse for cardiac rhythm and oxygen saturation which remained stable.  The patient tolerated the procedure well and left interventional  radiology in stable condition.      Impression    IMPRESSION:  Left sided 10.2 Kittitian chest tube placement under ultrasound guidance.          GHASSAN COLLAZO DO         SYSTEM ID:  [id number]   CT Chest w/o Contrast    Narrative    CT CHEST WITHOUT CONTRAST  6/24/2024 9:21 AM     HISTORY: Respiratory failure. Evaluate for left mainstem " bronchus  obstruction or lesion. Rule out lung cancer. Potential  contraindications to iodinated contrast.    TECHNIQUE: CT scan of the chest was performed without IV contrast.  Multiplanar reformats were obtained. Dose reduction techniques were  used.  CONTRAST: None.    COMPARISON: 6/21/2024    FINDINGS:   LUNGS AND PLEURA: Persistent complete collapse of the left lung. There  is mucous material or soft tissue within the distal left mainstem  bronchus extending into the left upper and lower lobe bronchi. There  are a few air bronchograms in both lobes. A new left-sided chest tube  has been placed with decrease in now small left pleural effusion.  There is a small left pneumothorax. A small to moderate-sized right  pleural effusion has not appreciably changed. Mild right lower lobe  atelectasis. Mild groundglass opacity and smooth septal thickening has  developed in the right lung suggestive of pulmonary edema.    MEDIASTINUM/AXILLAE: New right IJ dialysis catheter in the right  atrium. New left PICC line tip in the SVC. Severe coronary artery  calcification. Stable left breast edema.    UPPER ABDOMEN: Negative.      Impression    IMPRESSION:  1.  Persistent collapse of the left lung. There is mucous material in  the distal left mainstem bronchus and extending into the lobar  bronchi.  2.  New left chest tube with decrease in size of left pleural effusion  and new small pneumothorax.  3.  Mild pulmonary edema has developed in the right lung.    RAGINI DEVINE MD         SYSTEM ID:  [id number]   XR Chest Port 1 View    Narrative    EXAM: XR CHEST PORT 1 VIEW  LOCATION: [facility]  DATE: 6/25/2024    INDICATION: pleural effusion  COMPARISON: Chest x-ray dated 6/23/2024, chest CT dated 6/24/2024      Impression    IMPRESSION: Right-sided central venous catheter terminates over the cavoatrial junction. Left-sided PICC line terminates over the proximal right atrium. Complete opacification of the  left hemithorax is redemonstrated. Pneumothorax seen at CT is not   visualized at plain film. Persistent right basilar atelectasis and probable small posterior layering effusion. Left basilar chest tube remains in place.   XR Chest Port 1 View    Narrative    XR CHEST PORT 1 VIEW 6/26/2024 6:05 AM    HISTORY: pleural effusion    COMPARISON: 6/25/2024      Impression    IMPRESSION: Right IJ tunnel hemodialysis catheter tip at the SVC/right  atrium. Left PICC tip in the high right atrium. Left basilar pigtail  chest tube. Stable complete opacification of the left hemithorax,  likely related to a large left pleural effusion. Stable mild hazy  opacities in the right lung base either due to atelectasis or  pulmonary edema.     BRANDON ALBERTS MD         SYSTEM ID:  [id number]   XR Chest Port 1 View    Narrative    CHEST PORTABLE ONE VIEW   6/26/2024 1:15 PM     HISTORY: Post bronchoscopy.    COMPARISON: 6/26/2024.      Impression    IMPRESSION: Improved aeration of the left upper to midlung. Persistent  consolidation at the left mid to low lung. Cardiac silhouette is  obscured. Right lung shows persistent ill-defined opacity along the  medial right lung base. No pneumothorax identified. Left pleural fluid  appears moderate. Right chest dual lumen venous catheter tip is stable  at the low SVC. Stable left PICC line tip at the cavoatrial level.     SANTANA CHAIDEZ MD         SYSTEM ID:  [id number]   US Upper Extremity Venous Duplex Right    Narrative    US UPPER EXTREMITY VENOUS DUPLEX RIGHT  6/27/2024 10:09 AM     HISTORY: , right arm redness, swelling and pain at fore arm tattoo  site    COMPARISON: None.    TECHNIQUE: Examination of the upper extremity veins was performed with  graded compression and 2-D ultrasound and color doppler spectral  waveform analysis.     FINDINGS:  There is no evidence of thrombosis of the axillary,  brachial, basilic or cephalic veins.  The veins in the forearm show no  evidence of thrombosis. There  is edema in the right forearm.      Impression    IMPRESSION: No evidence for DVT in the right upper extremity    ALBERTO BENITEZ MD         SYSTEM ID:  [id number]   XR Chest Port 1 View    Narrative    EXAM: XR CHEST PORT 1 VIEW  LOCATION: [facility]  DATE: 6/26/2024    INDICATION: chest pain  COMPARISON: Earlier today at 1313 hours      Impression    IMPRESSION: No new abnormality. Interval removal of left pigtail pleural catheter. Improved aeration left lung with some decrease in left pleural effusion, likely modest pleural effusion and consolidation at left base persists but no new infiltrate.   Right hemithorax unchanged with tiny pleural effusion. Heart size and pulmonary vessels normal. Right IJ dialysis catheter and left-sided PICC line remain in good position.   XR Chest Port 1 View    Narrative    EXAM: XR CHEST PORT 1 VIEW  LOCATION: [facility]  DATE: 6/27/2024    INDICATION: pleural effusion  COMPARISON: 6/26/2024      Impression    IMPRESSION: Interval decrease in left basilar pleural fluid with still moderate pleural fluid and atelectasis remaining left lung base. Minimal right basilar pleural fluid and atelectasis. Cardiac enlargement. Pulmonary vascularity normal. Aortic   calcification. Right-sided central venous catheter with distal tip at cavoatrial junction.   XR Chest Port 1 View    Narrative    EXAM: XR CHEST PORT 1 VIEW  LOCATION: [facility]  DATE: 6/28/2024    INDICATION: pleural effusion  COMPARISON: 06/27/2024      Impression    IMPRESSION: Stable cardiomediastinal silhouette. Right central line and left PICC line tip cavoatrial junction. Pulmonary vascular congestion. Bibasilar atelectasis or infiltrate. Left greater than right pleural effusions. Linear density left mid chest   favored to be skinfold. Atherosclerotic aorta.   XR Chest Port 1 View    Narrative    EXAM: XR CHEST PORT 1 VIEW  LOCATION: [facility]  [facility]  DATE: 6/29/2024    INDICATION: pleural effusion  COMPARISON: 06/28/2024      Impression    IMPRESSION: Dual-lumen right central line. Stable cardiomediastinal silhouette. Left PICC line. Pulmonary vascular congestion. Bibasilar atelectasis or infiltrate and left greater than right pleural effusions similar. Atherosclerotic aorta.   XR Chest Port 1 View    Narrative    EXAM: XR CHEST PORT 1 VIEW  LOCATION: [facility]  DATE: 6/30/2024    INDICATION: pleural effusion  COMPARISON: 06/29/2024      Impression    IMPRESSION: Left PICC line and dual-lumen right central line. Stable cardia mediastinal silhouette. Pulmonary vascular congestion. Bilateral infiltrates (left greater than right). Moderate left and small right pleural effusion. Increased left effusion   compared to prior. Skin folds overlying the left chest. Atherosclerotic aorta   XR Chest Port 1 View    Narrative    EXAM: XR CHEST PORT 1 VIEW  LOCATION: [facility]  DATE: 7/1/2024    INDICATION: pleural effusion  COMPARISON: 6/30/2024 and multiple prior studies, CT chest 6/24/2024      Impression    IMPRESSION: No interval change. Right IJ dialysis catheter and left upper extremity PICC line are in good position overlying the distal SVC. Moderate left pleural effusion with associated atelectasis small right effusion are unchanged. Heart is of normal   size. No signs of pneumonia or failure.   IR CVC Tunnel Removal Right    Narrative    PROCEDURE: Tunneled central venous catheter removal.    HISTORY: Patient no longer requires tunneled central venous catheter.    PROCEDURE/FINDINGS: The catheter and exit site on the chest wall were  prepped and draped in the usual sterile fashion. The catheter was  pulled out completely.    Pressure was held at the venotomy and catheter exit site for 5  minutes. There was no evidence of supraclavicular hematoma, and  bleeding was controlled at the  chest site.  The site was cleansed and  dressed. The procedure was well tolerated.      Impression    IMPRESSION: Tunneled central venous catheter removal.    ALBERTO BENITEZ MD         SYSTEM ID:  [id number]   Echocardiogram Complete     Value    LVEF  60-65%    Narrative    [id number]  [id number]  [id number]  [id number]^JUANIS^OMID     [facility]  Echocardiography Laboratory  [address] [place] [address]     Name: KASIA WAN  MRN: [id number]  : 1960  Study Date: 2024 11:46 AM  Age: 63 yrs  Gender: Female  Patient Location: [facility]  Reason For Study: SOB  Ordering Physician: OMID OLIVAREZ  Referring Physician: Vasquez Benoit  Performed By: Saúl Hoffman     BSA: 1.6 m2  Height: 61 in  Weight: 132 lb  HR: 59  BP: 115/53 mmHg  ______________________________________________________________________________  Procedure  Complete Portable Echo Adult. Optison (NDC #9422-0610) given intravenously.  ______________________________________________________________________________  Interpretation Summary     Left ventricular systolic function is normal.  The visual ejection fraction is 60-65%.  Mid anterior and lateral severe hypokinesis  Distal inferior hypokinesis  The right ventricular systolic function is normal.  Atelectasis vs consolidation in left lung with associated effusion. No  hemodynamically significant valvular abnormalities on 2D or color flow  imaging. Compared to prior study, changes are noted.  ______________________________________________________________________________  Left Ventricle  The left ventricle is normal in size. Left ventricular systolic function is  normal. The visual ejection fraction is 60-65%. Grade I or early diastolic  dysfunction. Mid anterior and lateral severe hypokinesis  Distal inferior hypokinesis.     Right Ventricle  The right ventricle is not well visualized. The right ventricular systolic  function is normal.     Atria  The left atrium is  not well visualized. The left atrium is moderately dilated.  Right atrium not well visualized. The right atrium is mildly dilated.     Mitral Valve  The mitral valve is normal in structure and function. There is trace mitral  regurgitation.     Tricuspid Valve  The tricuspid valve is not well visualized. The right ventricular systolic  pressure is approximated at 23.9 mmHg plus the right atrial pressure.     Aortic Valve  The aortic valve is not well visualized. AV leaflets not well visualized but  appear calcified. There is trace aortic regurgitation. No hemodynamically  significant valvular aortic stenosis.     Pulmonic Valve  The pulmonic valve is not well visualized.     Vessels  Normal size aorta. The inferior vena cava is normal.     Pericardium  Trivial pericardial effusion.     ______________________________________________________________________________  MMode/2D Measurements & Calculations  IVSd: 0.82 cm  LVIDd: 5.1 cm  LVIDs: 3.6 cm  LVPWd: 0.86 cm  FS: 28.3 %  LV mass(C)d: 147.9 grams  LV mass(C)dI: 93.4 grams/m2     Ao root diam: 3.1 cm  LA dimension: 3.2 cm  asc Aorta Diam: 2.9 cm  LA/Ao: 1.0  LVOT diam: 2.0 cm  LVOT area: 3.0 cm2  Ao root diam index Ht(cm/m): 2.0  Ao root diam index BSA (cm/m2): 1.9  Asc Ao diam index BSA (cm/m2): 1.8  Asc Ao diam index Ht(cm/m): 1.9  LA Volume (BP): 58.8 ml  LA Volume Index (BP): 37.2 ml/m2  RWT: 0.34     TAPSE: 2.9 cm     Doppler Measurements & Calculations  MV E max perfecto: 113.0 cm/sec  MV A max perfecto: 110.1 cm/sec  MV E/A: 1.0  MV dec slope: 372.1 cm/sec2  MV dec time: 0.30 sec  PA acc time: 0.14 sec  TR max perfecto: 244.3 cm/sec  TR max P.9 mmHg  E/E' av.1  Lateral E/e': .  Medial E/e': .  RV S Perfecto: 11.7 cm/sec     ______________________________________________________________________________  Report approved by: Cheryl Camejo 2024 12:59 PM         Echocardiogram Limited     Value    LVEF  60%    Narrative     [id number]  [id number]  [id number]  [id number]^LITA^EMIR^VLADIMIR     [facility]  Echocardiography Laboratory  [address] [place] [address]     Name: KASIA WAN  MRN: [id number]  : 1960  Study Date: 2024 07:56 AM  Age: 63 yrs  Gender: Female  Patient Location: [facility]  Reason For Study: Cardiac Murmur  Ordering Physician: EMIR LONDON  Referring Physician: Vasquez Benoit MD  Performed By: Georgiana Castanon     BSA: 1.6 m2  Height: 61 in  Weight: 128 lb  HR: 67  BP: 159/76 mmHg  ______________________________________________________________________________  Procedure  Limited Portable Echo Adult. Optison (NDC #[id number]) given intravenously.  ______________________________________________________________________________  Interpretation Summary     1. The left ventricle is normal in structure, function and size. The visual  ejection fraction is estimated at 60%. Mild mid anterior hypokinesis.  2. The right ventricle is normal in structure, function and size.  3. No valve disease.     Compared to echo from 24, the mid anterior hypokinesis is now more mild,  otherwise no changes.  ______________________________________________________________________________  Left Ventricle  The left ventricle is normal in structure, function and size. There is normal  left ventricular wall thickness. The visual ejection fraction is estimated at  60%. Left ventricular diastolic function is normal. Mild mid anterior  hypokinesis.     Right Ventricle  The right ventricle is normal in structure, function and size.     Mitral Valve  There is no mitral regurgitation noted.     Tricuspid Valve  There is mild (1+) tricuspid regurgitation.     Aortic Valve  The aortic valve is normal in structure and function.     Pericardium  There is no pericardial effusion.     Rhythm  Sinus rhythm was noted.  ______________________________________________________________________________  Doppler  Measurements & Calculations  TR max ric: 301.6 cm/sec  TR max P.4 mmHg     ______________________________________________________________________________  Report approved by: Cheryl Chen 2024 12:04 PM           *Note: Due to a large number of results and/or encounters for the requested time period, some results have not been displayed. A complete set of results can be found in Results Review.       Discharge Medications   Current Discharge Medication List        START taking these medications    Details   calcium acetate (PHOSLO) 667 MG CAPS capsule Take 1 capsule (667 mg) by mouth 3 times daily (with meals) for 14 days  Qty: 42 capsule, Refills: 0    Associated Diagnoses: CKD (chronic kidney disease) stage 4, GFR 15-29 ml/min (H)           CONTINUE these medications which have CHANGED    Details   bumetanide (BUMEX) 1 MG tablet Take 1 tablet (1 mg) by mouth 3 times daily for 14 days  Qty: 42 tablet, Refills: 0    Associated Diagnoses: Primary hypertension      famotidine (PEPCID) 10 MG tablet Take 1 tablet (10 mg) by mouth daily for 30 days  Qty: 30 tablet, Refills: 0    Associated Diagnoses: CKD (chronic kidney disease) stage 4, GFR 15-29 ml/min (H)      hydrALAZINE (APRESOLINE) 10 MG tablet Take 1 tablet (10 mg) by mouth 3 times daily    Associated Diagnoses: Primary hypertension           CONTINUE these medications which have NOT CHANGED    Details   amLODIPine (NORVASC) 10 MG tablet Take 1 tablet (10 mg) by mouth daily  Qty: 30 tablet, Refills: 0    Associated Diagnoses: Benign essential hypertension      carvedilol (COREG) 12.5 MG tablet Take 1 tablet (12.5 mg) by mouth 2 times daily (with meals)  Qty: 60 tablet, Refills: 0    Associated Diagnoses: Benign essential hypertension      cetirizine (ZYRTEC) 5 MG tablet Take 1 tablet (5 mg) by mouth daily  Qty: 30 tablet, Refills: 0    Associated Diagnoses: Chronic allergic rhinitis due to animal hair and dander      clopidogrel (PLAVIX) 75 MG  tablet Take 1 tablet (75 mg) by mouth daily  Qty: 30 tablet, Refills: 0    Associated Diagnoses: Peripheral vascular disease (H24)      diphenoxylate-atropine (LOMOTIL) 2.5-0.025 MG tablet Take 1 tablet by mouth 4 times daily as needed for diarrhea  Qty: 30 tablet, Refills: 0    Associated Diagnoses: Wound infection      gabapentin (NEURONTIN) 100 MG capsule Take 2 capsules (200 mg) by mouth at bedtime  Qty: 60 capsule, Refills: 0    Associated Diagnoses: Wound infection      !! nicotine (NICODERM CQ) 14 MG/24HR 24 hr patch Place 1 patch onto the skin daily  Qty: 30 patch, Refills: 0    Associated Diagnoses: Tobacco use disorder      rosuvastatin (CRESTOR) 10 MG tablet Take 1 tablet (10 mg) by mouth at bedtime  Qty: 30 tablet, Refills: 0    Associated Diagnoses: Hyperlipidemia LDL goal <70      acetaminophen (TYLENOL) 500 MG tablet Take 1-2 tablets (500-1,000 mg) by mouth every 6 hours as needed for mild pain  Qty: 60 tablet, Refills: 0    Associated Diagnoses: Wound infection      budesonide-formoterol (SYMBICORT) 160-4.5 MCG/ACT Inhaler Inhale 2 puffs into the lungs two times daily Disp 3 inhalers  Qty: 30.6 g, Refills: 3    Associated Diagnoses: Chronic obstructive pulmonary disease, unspecified COPD type (H)      miconazole (MICATIN) 2 % external powder Apply topically 2 times daily  Qty: 50 g, Refills: 0    Associated Diagnoses: Fecal incontinence alternating with constipation      !! nicotine (NICODERM CQ) 14 MG/24HR 24 hr patch Place 1 patch onto the skin every 24 hours  Qty: 9 patch, Refills: 0    Associated Diagnoses: Cigarette nicotine dependence with nicotine-induced disorder      nitroGLYcerin (NITROSTAT) 0.4 MG sublingual tablet Place 1 tablet (0.4 mg) under the tongue See Admin Instructions for chest pain  Qty: 30 tablet, Refills: 11    Associated Diagnoses: Coronary artery disease involving native coronary artery of native heart without angina pectoris      ondansetron (ZOFRAN ODT) 4 MG ODT tab Take 1  tablet (4 mg) by mouth every 6 hours as needed for nausea or vomiting    Associated Diagnoses: Nausea      polyethylene glycol (MIRALAX) 17 GM/Dose powder Take 17 g by mouth daily  Qty: 510 g, Refills: 0    Associated Diagnoses: Wound infection      saccharomyces boulardii (FLORASTOR) 250 MG capsule Take 1 capsule (250 mg) by mouth 2 times daily  Qty: 60 capsule, Refills: 0    Associated Diagnoses: Encounter for management of wound VAC      silver sulfADIAZINE (SILVADENE) 1 % external cream Apply topically daily    Associated Diagnoses: S/P AKA (above knee amputation) unilateral, right (H)      wound support modular (EXPEDITE) LIQD bottle Take 60 mLs by mouth daily  Qty: 2000 mL, Refills: 0    Associated Diagnoses: S/P AKA (above knee amputation) unilateral, right (H)       !! - Potential duplicate medications found. Please discuss with provider.        STOP taking these medications       oxyCODONE (ROXICODONE) 5 MG tablet Comments:   Reason for Stopping:             Allergies   Allergies   Allergen Reactions    Contrast Dye Anaphylaxis     Pt reported facial and throat swelling with prior CT contrast    Pantoprazole      Protonix caused diffuse edema    Chantix [Varenicline]      Terrible dreams    Gluten Meal GI Disturbance     Pt has celiac disease    Penicillins Itching and Rash

## 2024-07-08 NOTE — PROGRESS NOTES
"[facility] Nurse Inpatient Assessment     Consulted for: Pressure Injury coccyx     Summary: On writer's arrival patient is up in manual w/c. She reports that she is discharging home today. Agrees to let [facility] nurse assess AKA wound. Wound is dressed per POC and shows improvement. Patient reports she has someone who can assist with wound care at home.     Coccyx: 6/27 very small re-opened area, over the site of a recent prior full-thickness pressure injury which had healed.   [facility] initially saw pt 6/24 and signed off as coccyx was then intact.    Right AKA: Incidental finding 6/24, woc was following right AKA wound on previous admission.  Stopped wound vac 6/24 and switched to topical dressings due to healed to skin level at portions of the wound base. (This wound not assessed 6/27)    Patient History (according to provider note(s):      \"63 year old female with PMH CAD, MI, peripheral vascular disease status angioplasty and right AKA with wound vac, DLE, COPD, CMT disease, tobacco use, CKD stage III, GERD, hypertension, history of B-cell lymphoma, hyperlipidemia, depression, anxiety, who was admitted on 6/21/2024 with acute hypoxic respiratory failure due to a large left pleural effusion, hyperkalemia, and hyponatremia.                Patient recently admitted to [facility] 3/29-4/22/24 with right limb ischemia in setting of severe PAD ultimately requiring right AKA with complicated post op course (including LIMA requiring HD) after which she was discharged to  ARU. She was readmitted to [facility] 5/15-5/29/24 with stump eschars requiring surgical debridement and wound VAC placement.                This admission, patient underwent left thoracentesis with 800mL and 1200mL fluid removed. Severe hyperkalemia improved with Lokelma. Patient has been anuric since admission, started on dialysis on 6/23. Chest tube placed 6/23, CT on 6/24 shows limited lung expansion concerning for left " "mainstem obstruction.\"    Assessment:      Areas visualized during today's visit:  Right residual limb    Pressure injury location: Coccyx (not assessed 7/8)    Last photo: 6-27-24      Wound due to: Pressure Injury per historical charting, appears to have reached Stage 3 previously  Wound history/plan of care: chart review reveals pt had prior small full-thickness pressure injury, developed during recent prolonged hospitalizations with right AKA.  Wound had healed but has now slightly reopened as of [facility] visit 6/27.  Pt able to roll to her side with light assist x 1.  Mepilex Sacral has been in use.   Wound base: pink moist tissue     Palpation of the wound bed: normal     Drainage: small     Description of drainage: serosanguinous and tan     Measurements (length x width x depth, in cm): 0.6 x 0.4 x 0.1 cm (positional)     Tunneling: N/A     Undermining: N/A  Periwound skin: Intact      Color: deep pink, blanchable      Temperature: normal   Odor: none  Pain: tender, pain when lying on area  Pain interventions prior to dressing change: patient tolerated well  Treatment goal: Heal  and Protection  STATUS: stable  Supplies ordered: supplies stored on unit, discussed with RN, and discussed with patient     Wound location: right AKA     Photo: 7/8      Distal residual limb  1 x 1 x < 0.1 cm  Dry serous drainage, pink hypergranular tissue      Wound due to: Surgical Wound  Wound history/plan of care: Wound vac in place until 6/24. Aquacel Ag in use since that time.   Wound base: pink-red granulation tissue, yellow-tan slough ~10%     Palpation of the wound bed: normal      Drainage: small     Description of drainage: serosanguinous and tan     Measurements (length x width x depth, in cm): largest 12.5 x 6 x  0.2 cm     Tunneling: N/A     Undermining: N/A  Periwound skin: Erythema- blanchable      Color: pink      Temperature: normal   Odor: none  Pain: denies , sore  Pain interventions prior to dressing change: " "N/A  Treatment goal: Heal , Drainage control, Infection control/prevention, Remove necrotic tissue, and Soften necrotic tissue  STATUS: improving  Supplies ordered: at bedside, supplies stored on unit, discussed with RN, and discussed with patient      Treatment Plan:     Coccyx wound care: Daily and prn for loose/soiled dressing:  Cleanse with wound cleanser. Pat dry.  Swab periwound area with no-sting skin barrier film, let dry.  Cover with Mepilex Sacral.   PIP measures.  Use ceiling lift to move patient in bed whenever possible, to minimize friction and shear to coccyx/sacrum.       Wound care  Start:  06/24/24 1404,   EVERY OTHER DAY,   Routine      Comments: Location: right stump  Care: provided every other day by primary RN  1. Cleanse every other day with wound cleanser and 4x4\" gauze, pat dry  2. Apply Criticiad paste to wound edges (no need to scrub off all white paste, reapply more)  3. Cover wound base with Aquacel Ag Advantage    4. Add ABD as needed for drainage  5. Secure with stockinette or ace wrap or EdemaWear          Skin care precautions  Start:  06/24/24 1404,   EFFECTIVE NOW,   Routine        Comments: Pressure Injury Prevention (PIP) Plan:  If patient is declining pressure injury prevention interventions: Explore reason why and address patient's concerns, Educate on pressure injury risk and prevention intervention(s), If patient is still declining, document \"informed refusal\" , and Ensure Care team is aware ( provider, charge nurse, etc)  Mattress: Follow bed algorithm, reassess daily and order specialty mattress, if indicated.  HOB: Maintain at or below 30 degrees, unless contraindicated  Repositioning in bed: Every 1-2 hours , Left/right positioning; avoid supine, and Raise foot of bed prior to raising head of bed, to reduce patient sliding down (shear)  Heels: Keep elevated off mattress and Pillows under calves  Protective Dressing: Sacral Mepilex for prevention (#242243),  especially for " the agitated patient  Positioning Equipment:None  Chair positioning: Chair cushion (#291749) , Assist patient to reposition hourly, and Do NOT use a donut for sitting (this increases pressure to smaller area and creates a higher potential for injury)    If patient has a buttock pressure injury, or high risk for PI use chair cushion or SPS.  Moisture Management: Perineal cleansing /protection: Follow Incontinence Protocol, Avoid brief in bed, Clean and dry skin folds with bathing , Consider InterDry (#867213) between folds, and Moisturize dry skin  Under Devices: Inspect skin under all medical devices during skin inspection , Ensure tubes are stabilized without tension, and Ensure patient is not lying on medical devices or equipment when repositioned  Ask provider to discontinue device when no longer needed.          Orders: Reviewed    RECOMMEND PRIMARY TEAM ORDER: None, at this time  Education provided: importance of repositioning, plan of care, Infection prevention , and Off-loading pressure  Discussed plan of care with: Patient and Nurse  WOC nurse follow-up plan: Weekly  Notify WOC if wound(s) deteriorate.  Nursing to notify the Provider(s) and re-consult the WOC Nurse if new skin concern.    DATA:     Current support surface: Standard  Standard Isoflex gel  Containment of urine/stool: Incontinence Protocol, Incontinent pad in bed, and Suction based external urinary catheter   BMI: Body mass index is 20.83 kg/m .   Active diet order: Orders Placed This Encounter      Combination Diet Gluten Free Diet; 2 gm NA Diet     Output: I/O last 3 completed shifts:  In: -   Out: 1600 [Urine:1600]     Labs:   Recent Labs   Lab 07/08/24  0610   ALBUMIN 2.6*   HGB 8.5*   WBC 4.4     Pressure injury risk assessment:   Sensory Perception: 4-->no impairment  Moisture: 4-->rarely moist  Activity: 2-->chairfast  Mobility: 3-->slightly limited  Nutrition: 3-->adequate  Friction and Shear: 1-->problem  Cesar Score: 17    Sheila  "Yaya RN, CWOCN  Please contact via [place] at name or group \"[facility] nurse\"- M-F 8A-4P  Leave VM @ *[phone] for non-urgent needs. Checked occasionally M-F.   "

## 2024-07-08 NOTE — PROGRESS NOTES
Kidney function stable on current dose diuretic.  Weight is stable at 50 kg.  Na up to 132.  CO2 is stable at  32.    I think she could be discharged from our standpoint.    I have arranged nephrology follow up for her at out  office with Dr. Vaz on Wed July 17 at 3:00 PM.      Jacobo Torre MD

## 2024-07-08 NOTE — PLAN OF CARE
Goal Outcome Evaluation:    Pt A&OX4, VSS on RA except slightly elevated BP. Dressing CDI. Pain manageable. Nephrology following. Reviewed discharge instruction with the pt and pt discharged to home.

## 2024-07-09 ENCOUNTER — TELEPHONE (OUTPATIENT)
Dept: CARDIOLOGY | Facility: CLINIC | Age: 64
End: 2024-07-09
Payer: COMMERCIAL

## 2024-07-09 NOTE — TELEPHONE ENCOUNTER
"Patient was admitted to [facility] on 6/21/24 with SOB. \"A chest x-ray was done demonstrating complete opacification of the left hemithorax. Patient was directed to the ED. CT chest demonstrated a large left pleural effusion with complete collapse of the left upper and left lower lobes, with occlusion of the left mainstem bronchus. There was also a moderate right pleural effusion, diffuse edema in the subcutaneous tissues. Patient underwent thoracentesis with removal of 800 cc from the left side.\" Acute anemia requiring blood transfusion. Mildly elevated troponin. Severe hyperkalemia.    PMH: severe PAD status post multiple revascularizations, COPD, lymphoma, nonobstructive coronary artery disease, hypertension, hyperlipidemia, smoking, recovered heart failure with reduced ejection fraction secondary to nonischemic cardiomyopathy. Patient recently admitted to [facility] 3/29-4/22/24 with right limb ischemia in setting of severe PAD ultimately requiring right AKA with complicated post op course (including LIMA requiring HD) after which she was discharged to  ARU. She was readmitted to [facility] 5/15-5/29/24 with stump eschars requiring surgical debridement and wound VAC placement.     6/22/24: Echo showed EF of 60-65%. Mid anterior and lateral severe hypokinesis. Distal inferior hypokinesis. The right ventricular systolic function is normal. Atelectasis vs consolidation in left lung with associated effusion. No hemodynamically significant valvular abnormalities on 2D or color flow  imaging.    6/22/24: Repeat left thoracentesis drained 1200 ml.    6/23/24: Started on HD as was anuric. Started making urine 6/25, creatinine stabilized on 6/28 and now fluctuating 2.7-3 HD catheter pulled.     6/23/24: CT placed. Removed 6/26/24.    PTA Bumex dosage was increased and Hydralazine dosage was decreased.    Called patient to discuss any post hospital d/c questions she may have, review medication changes, and confirm f/u " appts. Patient denied any questions regarding new medications or changes to PTA medications.     Patient denied any SOB, chest pain, or light headedness.    RN confirmed with patient that she needs to be scheduled for a cardiology BALJINDER OV as ordered. Scheduling and Dr. Huang's Team RN phone number provided.    Patient advised to call clinic with any cardiac related questions or concerns prior to this baljinder't. Patient verbalized understanding and agreed with plan. YULIA Robins RN.

## 2024-07-10 ENCOUNTER — TELEPHONE (OUTPATIENT)
Dept: INTERNAL MEDICINE | Facility: CLINIC | Age: 64
End: 2024-07-10
Payer: COMMERCIAL

## 2024-07-10 ENCOUNTER — MEDICAL CORRESPONDENCE (OUTPATIENT)
Dept: HEALTH INFORMATION MANAGEMENT | Facility: CLINIC | Age: 64
End: 2024-07-10
Payer: COMMERCIAL

## 2024-07-10 ENCOUNTER — PATIENT OUTREACH (OUTPATIENT)
Dept: CARE COORDINATION | Facility: CLINIC | Age: 64
End: 2024-07-10
Payer: COMMERCIAL

## 2024-07-10 NOTE — PROGRESS NOTES
Clinic Care Coordination- Chart Review Only    Background: Primary Care Care Coordination referral entered by Inpatient Care Manager through IP to Amb CM handoff process.    Assessment:  Upon chart review, patient had communication with Cardiology RN on 7/9/24 for reason of discussing hospital follow up plan of care and answering questions patient may have related to discharge instructions.     Plan: No intervention planned by [facility] Primary Care Care Coordination team at this time to minimize duplicative efforts and reduce potential confusion for patient.  Patient can be encouraged to follow discharge instructions as outlined on AVS.     Nel Waggoner, ESTHERN, RN   Manager of Ambulatory Care Management  [facility]

## 2024-07-10 NOTE — TELEPHONE ENCOUNTER
Home Care is calling regarding an established patient with [facility].     Requesting orders from: Vasquez Benoit  Provider is following patient: Yes  Is this a 60-day recertification request?  No    Orders Requested    Skilled Nursing  Request for resumption in care.     Verbal okay needed.     Patient came home from the hospital with a wound on her coccyx. [facility] nurse did not specify what kind of wound and did not stage. Home care nurse said it looks like a stage two, asking if okay to code it as Stage 2 pressure ulcer?    She needs this to code to report to CMS - center for medicare and medicaid and for billing purposes.     Otherwise she will need to leave it as unspecified.     Occupational Therapy  Request for initial evaluation and treatment (one time)    Verbal okay given.     Social Work  Request for initial evaluation and treatment (one time)    Verbal okay given.     Verbal orders given for OT & SW.  Information was gathered for SN.  Provider review needed.  RN will contact Home Care with information after provider review.  Confirmed ok to leave a detailed message with call back.  Contact information confirmed and updated as needed.    Vianney Plunkett RN

## 2024-07-11 ENCOUNTER — PATIENT OUTREACH (OUTPATIENT)
Dept: CARE COORDINATION | Facility: CLINIC | Age: 64
End: 2024-07-11
Payer: COMMERCIAL

## 2024-07-11 ENCOUNTER — TELEPHONE (OUTPATIENT)
Dept: PHARMACY | Facility: OTHER | Age: 64
End: 2024-07-11
Payer: COMMERCIAL

## 2024-07-11 NOTE — TELEPHONE ENCOUNTER
MTM referral from: Transitions of Care (recent hospital discharge or ED visit)    MTM referral outreach attempt #2 on July 11, 2024 at 10:02 AM      Outcome: Left Message    Use hp part d map for the carrier/Plan on the flowsheet      [place] Message Sent    Mary Whitaker  [facility]

## 2024-07-11 NOTE — LETTER
[facility] CARE COORDINATION  [address] [facility] [place] [address]    July 11, 2024    Shirley Lechugadavid Hendricks  [id number]  [place] [address]      Dear Shirley,    I am a clinic care coordinator who works with Vasquez Benoit MD with the [facility]. I wanted to introduce myself and provide you with my contact information for you to be able to call me with any questions or concerns. Below is a description of clinic care coordination and how I can further assist you.       The clinic care coordination team is made up of a registered nurse, , financial resource worker and community health worker who understand the health care system. The goal of clinic care coordination is to help you manage your health and improve access to the health care system. Our team works alongside your provider to assist you in determining your health and social needs. We can help you obtain health care and community resources, providing you with necessary information and education. We can work with you through any barriers and develop a care plan that helps coordinate and strengthen the communication between you and your care team.  Our services are voluntary and are offered without charge to you personally.    Please feel free to contact me with any questions or concerns regarding care coordination and what we can offer.      We are focused on providing you with the highest-quality healthcare experience possible.    Sincerely,     Merissa Martinez,  [facility]  Clinic Care Coordinator  [facility]  [phone]  [email or web address]

## 2024-07-11 NOTE — PROGRESS NOTES
Clinic Care Coordination Contact  [facility]/Voicemail    Clinical Data: Care Coordinator Outreach    Outreach Documentation Number of Outreach Attempt   6/12/2024   1:53 PM 1   7/11/2024   8:43 AM 1       Left message on patient's voicemail with call back information and requested return call.    Plan: Care Coordinator will send care coordination introduction letter with care coordinator contact information and explanation of care coordination services via [employer]hart. Care Coordinator will try to reach patient again in 1-2 business days.    Merissa Martinez,  [facility]  Clinic Care Coordinator  [facility]  [phone]  [email or web address]       Normal

## 2024-07-12 ENCOUNTER — DOCUMENTATION ONLY (OUTPATIENT)
Dept: ORTHOPEDICS | Facility: CLINIC | Age: 64
End: 2024-07-12
Payer: COMMERCIAL

## 2024-07-12 ENCOUNTER — TELEPHONE (OUTPATIENT)
Dept: INTERNAL MEDICINE | Facility: CLINIC | Age: 64
End: 2024-07-12
Payer: COMMERCIAL

## 2024-07-12 NOTE — TELEPHONE ENCOUNTER
Called and left message for Neena with the provider's response. Advised Neena to call the clinic back with any additional questions and/or concerns.     Mary Murillo RN

## 2024-07-12 NOTE — PROGRESS NOTES
Clinic Care Coordination Contact  [facility]/Voicemail    Clinical Data: Care Coordinator Outreach    Outreach Documentation Number of Outreach Attempt   6/12/2024   1:53 PM 1   7/11/2024   8:43 AM 1   7/12/2024   2:46 PM 2       Left message on patient's voicemail with call back information and requested return call.    Plan: Care Coordinator sent care coordination introduction letter on 7/11/24 via [place]. Care Coordinator will do no further outreaches at this time.    Merissa Martinez  [facility]  Clinic Care Coordinator  [facility]  [phone]  [email or web address]

## 2024-07-12 NOTE — TELEPHONE ENCOUNTER
I have not seen pt since hospitalization but pictures from  nurse visit while hospitalized appear to show ulceration was stage 2 as thought by HC RN. Agree with stage 2 level of ulceration status. OK for orders as requested

## 2024-07-12 NOTE — PROGRESS NOTES
Spoke to Shirley Chawla on the phone. She mentions that she has been discharged and is at home. She has her stitches taken out but has a couple open wounds that still need to be healed. She mentions that she is doing well and there is progress with the healing.    P: I will continue to follow up with Shirley Chawla. Once she is fully healed, she will likely be ready to start prosthetic intervention.

## 2024-07-12 NOTE — TELEPHONE ENCOUNTER
Home Care is calling regarding an established patient with [facility].       Requesting orders from: Vasquez Benoit  Provider is following patient: Yes  Is this a 60-day recertification request?  No    Orders Requested    Occupational Therapy  Request for delay in care, service is not able to be provided within same scheduled day.   Pt requesting to reschedule appointment for next week 7/15 130PM. Pt refused today's appointment due to schedule conflict: pt preference: pt prefers to be seen before 330pm.     Information was gathered and will be sent to provider for review.  RN will contact Home Care with information after provider review.  Confirmed ok to leave a detailed message with call back.  Contact information confirmed and updated as needed.    Vikki Ramirez RN

## 2024-07-12 NOTE — TELEPHONE ENCOUNTER
Called and spoke with Radha to relay provider approval of requested home care orders.     Thank you,  Nehal Rodgers RN

## 2024-07-15 ENCOUNTER — MEDICAL CORRESPONDENCE (OUTPATIENT)
Dept: HEALTH INFORMATION MANAGEMENT | Facility: CLINIC | Age: 64
End: 2024-07-15
Payer: COMMERCIAL

## 2024-07-15 ENCOUNTER — TELEPHONE (OUTPATIENT)
Dept: INTERNAL MEDICINE | Facility: CLINIC | Age: 64
End: 2024-07-15
Payer: COMMERCIAL

## 2024-07-15 NOTE — TELEPHONE ENCOUNTER
Home Care is calling regarding an established patient with [facility].       Requesting orders from: Vasquez Benoit  Provider is following patient: Yes  Is this a 60-day recertification request?  No    Orders Requested    Skilled Nursing  Request for delay in care, service is not able to be provided within same scheduled day.       Home care RN saw the patient today for lab draw. RN was unable to get sample today. Calling for verbal order for OK to try again on 7/17 for lab draw.      Confirmed ok to leave a detailed message with call back.  Contact information confirmed and updated as needed.    Isha Owens, RN

## 2024-07-15 NOTE — TELEPHONE ENCOUNTER
Radha returned call to the clinic to check on this order request.   Informed Radha that this has already been approved and relayed to her.   She verbalized understanding.     Thank you,  Nehal Rodgesr RN

## 2024-07-16 ENCOUNTER — TELEPHONE (OUTPATIENT)
Dept: INTERNAL MEDICINE | Facility: CLINIC | Age: 64
End: 2024-07-16
Payer: COMMERCIAL

## 2024-07-16 NOTE — TELEPHONE ENCOUNTER
Home Care is calling regarding an established patient with [facility].       Requesting orders from: Vasquez Benoit  Provider is following patient: Yes  Is this a 60-day recertification request?  No    Orders Requested    Physical Therapy  Request for initial evaluation and treatment (one time)       Verbal orders given.  Home Care will send orders for provider to sign.  Confirmed ok to leave a detailed message with call back.  Contact information confirmed and updated as needed.    Dorina Leal RN

## 2024-07-17 ENCOUNTER — LAB REQUISITION (OUTPATIENT)
Dept: LAB | Facility: CLINIC | Age: 64
End: 2024-07-17
Payer: COMMERCIAL

## 2024-07-17 ENCOUNTER — MEDICAL CORRESPONDENCE (OUTPATIENT)
Dept: HEALTH INFORMATION MANAGEMENT | Facility: CLINIC | Age: 64
End: 2024-07-17

## 2024-07-17 DIAGNOSIS — N17.9 ACUTE KIDNEY FAILURE, UNSPECIFIED (H): ICD-10-CM

## 2024-07-17 LAB
ALBUMIN SERPL BCG-MCNC: 3.2 G/DL (ref 3.5–5.2)
ANION GAP SERPL CALCULATED.3IONS-SCNC: 11 MMOL/L (ref 7–15)
BASOPHILS # BLD AUTO: 0.1 10E3/UL (ref 0–0.2)
BASOPHILS NFR BLD AUTO: 1 %
BUN SERPL-MCNC: 65.3 MG/DL (ref 8–23)
CALCIUM SERPL-MCNC: 8.1 MG/DL (ref 8.8–10.4)
CHLORIDE SERPL-SCNC: 95 MMOL/L (ref 98–107)
CREAT SERPL-MCNC: 2.55 MG/DL (ref 0.51–0.95)
EGFRCR SERPLBLD CKD-EPI 2021: 20 ML/MIN/1.73M2
EOSINOPHIL # BLD AUTO: 0.4 10E3/UL (ref 0–0.7)
EOSINOPHIL NFR BLD AUTO: 8 %
ERYTHROCYTE [DISTWIDTH] IN BLOOD BY AUTOMATED COUNT: 13.5 % (ref 10–15)
GLUCOSE SERPL-MCNC: 101 MG/DL (ref 70–99)
HCO3 SERPL-SCNC: 26 MMOL/L (ref 22–29)
HCT VFR BLD AUTO: 26.7 % (ref 35–47)
HGB BLD-MCNC: 8.5 G/DL (ref 11.7–15.7)
IMM GRANULOCYTES # BLD: 0 10E3/UL
IMM GRANULOCYTES NFR BLD: 0 %
LYMPHOCYTES # BLD AUTO: 1.6 10E3/UL (ref 0.8–5.3)
LYMPHOCYTES NFR BLD AUTO: 31 %
MCH RBC QN AUTO: 30.6 PG (ref 26.5–33)
MCHC RBC AUTO-ENTMCNC: 31.8 G/DL (ref 31.5–36.5)
MCV RBC AUTO: 96 FL (ref 78–100)
MONOCYTES # BLD AUTO: 0.4 10E3/UL (ref 0–1.3)
MONOCYTES NFR BLD AUTO: 8 %
NEUTROPHILS # BLD AUTO: 2.6 10E3/UL (ref 1.6–8.3)
NEUTROPHILS NFR BLD AUTO: 51 %
NRBC # BLD AUTO: 0 10E3/UL
NRBC BLD AUTO-RTO: 0 /100
PHOSPHATE SERPL-MCNC: 5 MG/DL (ref 2.5–4.5)
PLATELET # BLD AUTO: 187 10E3/UL (ref 150–450)
POTASSIUM SERPL-SCNC: 4.6 MMOL/L (ref 3.4–5.3)
RBC # BLD AUTO: 2.78 10E6/UL (ref 3.8–5.2)
SODIUM SERPL-SCNC: 132 MMOL/L (ref 135–145)
WBC # BLD AUTO: 5.1 10E3/UL (ref 4–11)

## 2024-07-17 PROCEDURE — 80069 RENAL FUNCTION PANEL: CPT | Mod: ORL | Performed by: INTERNAL MEDICINE

## 2024-07-17 PROCEDURE — 85025 COMPLETE CBC W/AUTO DIFF WBC: CPT | Mod: ORL | Performed by: INTERNAL MEDICINE

## 2024-07-18 ENCOUNTER — TRANSFERRED RECORDS (OUTPATIENT)
Dept: HEALTH INFORMATION MANAGEMENT | Facility: CLINIC | Age: 64
End: 2024-07-18
Payer: COMMERCIAL

## 2024-07-18 ENCOUNTER — TELEPHONE (OUTPATIENT)
Dept: INTERNAL MEDICINE | Facility: CLINIC | Age: 64
End: 2024-07-18
Payer: COMMERCIAL

## 2024-07-18 LAB
ALT SERPL-CCNC: 21 U/L (ref 0–34)
AST SERPL-CCNC: 33 U/L (ref 14–36)
CREATININE (EXTERNAL): 2.2 MG/DL (ref 0.66–1.25)
GFR ESTIMATED (EXTERNAL): 24.2 ML/MIN/1.73M2
GLUCOSE (EXTERNAL): 109 MG/DL (ref 74–100)
POTASSIUM (EXTERNAL): 5.1 MMOL/L (ref 3.5–5.1)

## 2024-07-18 NOTE — TELEPHONE ENCOUNTER
Home Care is calling regarding an established patient with [facility].       Requesting orders from: Vasquez Benoit  Provider is following patient: Yes  Is this a 60-day recertification request?  No    Orders Requested    Physical Therapy  Request for initial certification (first set of orders)   Frequency: 1w3 to work on strength and transfers      Information was gathered and will be sent to provider for review.  RN will contact Home Care with information after provider review.  Confirmed ok to leave a detailed message with call back.  Contact information confirmed and updated as needed.    Jadyn Rivera RN

## 2024-07-19 ENCOUNTER — MEDICAL CORRESPONDENCE (OUTPATIENT)
Dept: HEALTH INFORMATION MANAGEMENT | Facility: CLINIC | Age: 64
End: 2024-07-19
Payer: COMMERCIAL

## 2024-07-21 ENCOUNTER — APPOINTMENT (OUTPATIENT)
Dept: GENERAL RADIOLOGY | Facility: CLINIC | Age: 64
End: 2024-07-21
Attending: EMERGENCY MEDICINE
Payer: COMMERCIAL

## 2024-07-21 ENCOUNTER — HOSPITAL ENCOUNTER (EMERGENCY)
Facility: CLINIC | Age: 64
Discharge: HOME | End: 2024-07-21
Attending: EMERGENCY MEDICINE | Admitting: EMERGENCY MEDICINE
Payer: COMMERCIAL

## 2024-07-21 VITALS
TEMPERATURE: 97.6 F | HEART RATE: 66 BPM | DIASTOLIC BLOOD PRESSURE: 63 MMHG | HEIGHT: 61 IN | BODY MASS INDEX: 25.12 KG/M2 | OXYGEN SATURATION: 91 % | SYSTOLIC BLOOD PRESSURE: 125 MMHG

## 2024-07-21 VITALS
HEART RATE: 66 BPM | SYSTOLIC BLOOD PRESSURE: 166 MMHG | OXYGEN SATURATION: 93 % | DIASTOLIC BLOOD PRESSURE: 82 MMHG | TEMPERATURE: 98.1 F | RESPIRATION RATE: 20 BRPM

## 2024-07-21 DIAGNOSIS — J90 BILATERAL PLEURAL EFFUSION: ICD-10-CM

## 2024-07-21 DIAGNOSIS — J44.1 COPD EXACERBATION (H): ICD-10-CM

## 2024-07-21 DIAGNOSIS — R06.02 SHORTNESS OF BREATH: ICD-10-CM

## 2024-07-21 DIAGNOSIS — R79.89 ELEVATED TROPONIN: ICD-10-CM

## 2024-07-21 PROBLEM — I47.10 SVT (SUPRAVENTRICULAR TACHYCARDIA) (H): Status: RESOLVED | Noted: 2020-02-21 | Resolved: 2024-07-21

## 2024-07-21 LAB
ANION GAP SERPL CALCULATED.3IONS-SCNC: 11 MMOL/L (ref 7–15)
ATRIAL RATE - MUSE: 62 BPM
BASOPHILS # BLD AUTO: 0.1 10E3/UL (ref 0–0.2)
BASOPHILS NFR BLD AUTO: 1 %
BUN SERPL-MCNC: 50.3 MG/DL (ref 8–23)
CALCIUM SERPL-MCNC: 8.4 MG/DL (ref 8.8–10.4)
CHLORIDE SERPL-SCNC: 94 MMOL/L (ref 98–107)
CREAT SERPL-MCNC: 1.91 MG/DL (ref 0.51–0.95)
DIASTOLIC BLOOD PRESSURE - MUSE: NORMAL MMHG
EGFRCR SERPLBLD CKD-EPI 2021: 29 ML/MIN/1.73M2
EOSINOPHIL # BLD AUTO: 0.5 10E3/UL (ref 0–0.7)
EOSINOPHIL NFR BLD AUTO: 7 %
ERYTHROCYTE [DISTWIDTH] IN BLOOD BY AUTOMATED COUNT: 13.8 % (ref 10–15)
GLUCOSE SERPL-MCNC: 95 MG/DL (ref 70–99)
HCO3 SERPL-SCNC: 25 MMOL/L (ref 22–29)
HCT VFR BLD AUTO: 27.2 % (ref 35–47)
HGB BLD-MCNC: 9 G/DL (ref 11.7–15.7)
HOLD SPECIMEN: NORMAL
IMM GRANULOCYTES # BLD: 0 10E3/UL
IMM GRANULOCYTES NFR BLD: 0 %
INTERPRETATION ECG - MUSE: NORMAL
LYMPHOCYTES # BLD AUTO: 1.7 10E3/UL (ref 0.8–5.3)
LYMPHOCYTES NFR BLD AUTO: 25 %
MCH RBC QN AUTO: 31.8 PG (ref 26.5–33)
MCHC RBC AUTO-ENTMCNC: 33.1 G/DL (ref 31.5–36.5)
MCV RBC AUTO: 96 FL (ref 78–100)
MONOCYTES # BLD AUTO: 0.4 10E3/UL (ref 0–1.3)
MONOCYTES NFR BLD AUTO: 6 %
NEUTROPHILS # BLD AUTO: 4.1 10E3/UL (ref 1.6–8.3)
NEUTROPHILS NFR BLD AUTO: 61 %
NRBC # BLD AUTO: 0 10E3/UL
NRBC BLD AUTO-RTO: 0 /100
NT-PROBNP SERPL-MCNC: 7421 PG/ML (ref 0–900)
P AXIS - MUSE: 72 DEGREES
PLATELET # BLD AUTO: 201 10E3/UL (ref 150–450)
POTASSIUM SERPL-SCNC: 5.1 MMOL/L (ref 3.4–5.3)
PR INTERVAL - MUSE: 166 MS
QRS DURATION - MUSE: 78 MS
QT - MUSE: 408 MS
QTC - MUSE: 414 MS
R AXIS - MUSE: 56 DEGREES
RBC # BLD AUTO: 2.83 10E6/UL (ref 3.8–5.2)
SODIUM SERPL-SCNC: 130 MMOL/L (ref 135–145)
SYSTOLIC BLOOD PRESSURE - MUSE: NORMAL MMHG
T AXIS - MUSE: 74 DEGREES
TROPONIN T SERPL HS-MCNC: 149 NG/L
TROPONIN T SERPL HS-MCNC: 153 NG/L
VENTRICULAR RATE- MUSE: 62 BPM
WBC # BLD AUTO: 6.8 10E3/UL (ref 4–11)

## 2024-07-21 PROCEDURE — 71046 X-RAY EXAM CHEST 2 VIEWS: CPT

## 2024-07-21 PROCEDURE — 84484 ASSAY OF TROPONIN QUANT: CPT | Performed by: EMERGENCY MEDICINE

## 2024-07-21 PROCEDURE — 36415 COLL VENOUS BLD VENIPUNCTURE: CPT | Performed by: EMERGENCY MEDICINE

## 2024-07-21 PROCEDURE — 83880 ASSAY OF NATRIURETIC PEPTIDE: CPT | Performed by: EMERGENCY MEDICINE

## 2024-07-21 PROCEDURE — 80048 BASIC METABOLIC PNL TOTAL CA: CPT | Performed by: EMERGENCY MEDICINE

## 2024-07-21 PROCEDURE — 93005 ELECTROCARDIOGRAM TRACING: CPT

## 2024-07-21 PROCEDURE — 99285 EMERGENCY DEPT VISIT HI MDM: CPT | Mod: 25

## 2024-07-21 PROCEDURE — 85025 COMPLETE CBC W/AUTO DIFF WBC: CPT | Performed by: EMERGENCY MEDICINE

## 2024-07-21 RX ORDER — ALBUTEROL SULFATE 90 UG/1
2 AEROSOL, METERED RESPIRATORY (INHALATION) EVERY 4 HOURS PRN
Qty: 18 G | Refills: 0 | Status: SHIPPED | OUTPATIENT
Start: 2024-07-21

## 2024-07-21 RX ORDER — PREDNISONE 20 MG/1
TABLET ORAL
Qty: 10 TABLET | Refills: 0 | Status: ON HOLD | OUTPATIENT
Start: 2024-07-21 | End: 2024-07-31

## 2024-07-21 ASSESSMENT — COLUMBIA-SUICIDE SEVERITY RATING SCALE - C-SSRS
6. HAVE YOU EVER DONE ANYTHING, STARTED TO DO ANYTHING, OR PREPARED TO DO ANYTHING TO END YOUR LIFE?: NO
1. IN THE PAST MONTH, HAVE YOU WISHED YOU WERE DEAD OR WISHED YOU COULD GO TO SLEEP AND NOT WAKE UP?: NO
2. HAVE YOU ACTUALLY HAD ANY THOUGHTS OF KILLING YOURSELF IN THE PAST MONTH?: NO

## 2024-07-21 ASSESSMENT — ACTIVITIES OF DAILY LIVING (ADL)
ADLS_ACUITY_SCORE: 38

## 2024-07-21 NOTE — ED TRIAGE NOTES
Pt with hx of copd reports shortness of breath on exertion earlier today - became worse when trying to lay down to go bed tonight. Upon ems arrival pt had pursed lip breathing and wheezing which cleared with one duoneb treatment en route. Pt states she is feeling much better. Pt concerned that she only has a long acting inhaler and no rescue inhalers.     Triage Assessment (Adult)       Row Name 07/21/24 0039          Respiratory WDL    Respiratory WDL X  sob        Cardiac WDL    Cardiac WDL WDL        Cognitive/Neuro/Behavioral WDL    Cognitive/Neuro/Behavioral WDL WDL

## 2024-07-21 NOTE — DISCHARGE INSTRUCTIONS
Take the Symbicort twice a day no matter how well you feel  Use albuterol inhaler as needed when you are feeling short of breath or wheezing  Do not start the steroid prednisone unless you are having more difficulty breathing later today and then would take the full 5 days  Follow up with primary care provider

## 2024-07-21 NOTE — ED NOTES
Bed: ED03  Expected date: 7/21/24  Expected time: 12:30 AM  Means of arrival: Ambulance  Comments:  Yanet Stein 63F COPD; kimber

## 2024-07-21 NOTE — ED PROVIDER NOTES
Emergency Department Note      History of Present Illness     Chief Complaint   Shortness of Breath    HPI   Shirley Hendricks is a 63 year old female with a history of hyperlipidemia, hypertension, and COPD who presents to the ED for shortness of breath. Patient states that she was recently hospitalized for a pleural effusion; however, following discharge, she has been feeling fine up until the last couple of days. She states she has been having increased shortness of breath. She is on an inhaler for which she takes two puffs twice daily. She states this does not help at all. She denies chest pain, fever, or cough.     Per EMS, the patient's shortness of breath increased significantly when she went to lay down last night. EMS reports her oxygen saturation was down to 91-92. She is not usually on supplemental oxygen. They noted pursed lip breathing. They also reported that over the last couple of days, the patient would have to stop multiple times in her wheelchair to catch her breath. Patient's shortness of breath did improve with a nebulizer given by EMS.    Independent Historian   EMS as detailed above.    Review of External Notes   Reviewed discharge summary from 7/8/24.    Past Medical History   Medical History and Problem List   Hyperlipidemia  Peripheral vascular disease  Hypertension  Coronary arthrosclerosis  Anxiety  Depressive disorder  Autoimmune disease  Bilateral carpal tunnel syndrome  COPD  GERD  Lupus  Osteoarthrosis  Small bowel obstruction  SVT  Thrombocytopenia  Asthma    Medications   Amlodipine  Bumetanide  Carvedilol  Clopidogrel  Famotidine  Gabapentin  Hydralazine  Nitroglycerin  Rosuvastatin  Saccharomyces boulardii    Surgical History   Above knee amputation  Femoropopliteal bypass graft  Cardiac catheterization  Carpal tunnel release  Colonoscopy  Lower extremity embolectomy  Carotid endarterectomy  EGD  IR angiogram  Gyn surgery  Cholecystectomy  Orthopedic surgery  Femoral artery  aneurysm repair  Vascular surgery    Physical Exam   Patient Vitals for the past 24 hrs:   BP Temp Temp src Pulse Resp SpO2   07/21/24 0340 -- -- -- -- 20 (!) 88 %   07/21/24 0240 (!) 152/73 -- -- -- -- --   07/21/24 0236 -- -- -- -- -- 92 %   07/21/24 0038 (!) 178/88 98.1  F (36.7  C) Oral 69 22 94 %     Physical Exam  General: Sitting up in bed  Eyes:  The pupils are equal and round    Conjunctivae and sclerae are normal  ENT:    Atraumatic face  Neck:  Normal range of motion  CV:  Regular rate, regular rhythm     Skin warm and well perfused   Resp:  Non labored breathing on room air    No tachypnea    No cough heard    Diminished breath sounds on left lower lung  GI:  Abdomen is soft, there is no rigidity    No distension    No rebound tenderness     No abdominal tenderness  MS:  Normal muscular tone  Skin:  No rash or acute skin lesions noted  Neuro:   Awake, alert.      Speech is normal and fluent.    Face is symmetric.     Moves all extremities equally  Psych: Normal affect.  Appropriate interactions.    Diagnostics   Lab Results   Labs Ordered and Resulted from Time of ED Arrival to Time of ED Departure   BASIC METABOLIC PANEL - Abnormal       Result Value    Sodium 130 (*)     Potassium 5.1      Chloride 94 (*)     Carbon Dioxide (CO2) 25      Anion Gap 11      Urea Nitrogen 50.3 (*)     Creatinine 1.91 (*)     GFR Estimate 29 (*)     Calcium 8.4 (*)     Glucose 95     TROPONIN T, HIGH SENSITIVITY - Abnormal    Troponin T, High Sensitivity 153 (*)    NT PROBNP INPATIENT - Abnormal    N terminal Pro BNP Inpatient 7,421 (*)    CBC WITH PLATELETS AND DIFFERENTIAL - Abnormal    WBC Count 6.8      RBC Count 2.83 (*)     Hemoglobin 9.0 (*)     Hematocrit 27.2 (*)     MCV 96      MCH 31.8      MCHC 33.1      RDW 13.8      Platelet Count 201      % Neutrophils 61      % Lymphocytes 25      % Monocytes 6      % Eosinophils 7      % Basophils 1      % Immature Granulocytes 0      NRBCs per 100 WBC 0      Absolute  Neutrophils 4.1      Absolute Lymphocytes 1.7      Absolute Monocytes 0.4      Absolute Eosinophils 0.5      Absolute Basophils 0.1      Absolute Immature Granulocytes 0.0      Absolute NRBCs 0.0     TROPONIN T, HIGH SENSITIVITY - Abnormal    Troponin T, High Sensitivity 149 (*)      Imaging   XR Chest 2 Views   Final Result   IMPRESSION:       Moderate left and trace right pleural effusions with adjacent compressive atelectasis.      Otherwise, no focal airspace disease. No pneumothorax.      The cardiomediastinal silhouette is unremarkable. Aortic calcifications.      Multilevel degenerative changes of the spine. Multiple surgical clips project over the left upper extremity.        EKG   ECG taken at 0100, ECG read at 0103  Sinus rhythm with premature supraventricular complexes  Low voltage QRS  Cannot rule out anterior infarct, age undetermined  Abnormal ECG   No significant change as compared to prior, dated 6/22/24.  Rate 62 bpm. CA interval 166 ms. QRS duration 78 ms. QT/QTc 408/414 ms. P-R-T axes 72 56 74.    Independent Interpretation   CXR: Evidence of pleural effusion on left side    ED Course      Discussion of Management   None    ED Course   ED Course as of 07/21/24 0400   Sat Jul 20, 2024   0040 I obtained history and examined the patient as noted above.     Sun Jul 21, 2024   0400 The patient is comfortable with plan for discharge.       Optional/Additional Documentation  None    Medical Decision Making / Diagnosis   MDM   Shirley Hendricks is a 63 year old female who presented to the emergency department shortness of breath.  EKG shows sinus rhythm.  Chest x-ray showed pleural effusion.  I reviewed her prior x-ray and today's x-ray looks fairly stable to then.  She is back to normal now here in the emergency department.  She received 1 DuoNeb by EMS and this resolved her wheezing as well as her work of breathing.  She thinks that she needs a rescue inhaler at home.  It looks like she takes  symbicort BID but I do not see albuterol inhaler on her medication list.  No significant leg swelling.  Troponin is elevated though it is stable on recheck and actually improved from hospitalization.  Her creatinine is stable from hospitalization.  It seems that this was likely COPD exacerbation that is mild since it resolved with 1 DuoNeb by EMS.  Given this, will prescribe prednisone for her but if she feels well in the morning, I think it would be okay to not take it.  Albuterol prescribed for her.  Recommended continuing Symbicort.  Doubt PE.  Given x-ray looks stable, symptoms resolved with DuoNeb, I do not think thoracentesis is indicated today.  Follow-up with primary care provider.    Disposition   The patient was discharged.     Diagnosis     ICD-10-CM    1. Shortness of breath  R06.02       2. COPD exacerbation (H)  J44.1       3. Bilateral pleural effusion  J90       4. Elevated troponin  R79.89          Scribe Disclosure:  Chin IRAHETA, am serving as a scribe at 1:47 AM on 7/21/2024 to document services personally performed by Stephanie Mueller MD based on my observations and the provider's statements to me.        Stephanie Mueller MD  07/21/24 0929

## 2024-07-24 ENCOUNTER — TELEPHONE (OUTPATIENT)
Dept: INTERNAL MEDICINE | Facility: CLINIC | Age: 64
End: 2024-07-24
Payer: COMMERCIAL

## 2024-07-24 ENCOUNTER — MEDICAL CORRESPONDENCE (OUTPATIENT)
Dept: HEALTH INFORMATION MANAGEMENT | Facility: CLINIC | Age: 64
End: 2024-07-24
Payer: COMMERCIAL

## 2024-07-24 LAB — BACTERIA SPEC CULT: ABNORMAL

## 2024-07-24 NOTE — TELEPHONE ENCOUNTER
Home Care is calling regarding an established patient with [facility].       Requesting orders from: Vasquez Benoit  Provider is following patient: Yes  Is this a 60-day recertification request?  No    Orders Requested    Social Work  Request for initial certification (first set of orders)   Frequency:  2x/month for 1 months    Information was gathered and will be sent to provider for review.  RN will contact Home Care with information after provider review.  Confirmed ok to leave a detailed message with call back.  Contact information confirmed and updated as needed.    Sarika Adair RN

## 2024-07-25 ENCOUNTER — MEDICAL CORRESPONDENCE (OUTPATIENT)
Dept: HEALTH INFORMATION MANAGEMENT | Facility: CLINIC | Age: 64
End: 2024-07-25
Payer: COMMERCIAL

## 2024-07-29 DIAGNOSIS — T14.8XXA WOUND INFECTION: ICD-10-CM

## 2024-07-29 DIAGNOSIS — L08.9 WOUND INFECTION: ICD-10-CM

## 2024-07-29 DIAGNOSIS — I10 BENIGN ESSENTIAL HYPERTENSION: ICD-10-CM

## 2024-07-30 ENCOUNTER — APPOINTMENT (OUTPATIENT)
Dept: ULTRASOUND IMAGING | Facility: CLINIC | Age: 64
DRG: 280 | End: 2024-07-30
Payer: COMMERCIAL

## 2024-07-30 ENCOUNTER — APPOINTMENT (OUTPATIENT)
Dept: GENERAL RADIOLOGY | Facility: CLINIC | Age: 64
DRG: 280 | End: 2024-07-30
Payer: COMMERCIAL

## 2024-07-30 ENCOUNTER — HOSPITAL ENCOUNTER (INPATIENT)
Facility: CLINIC | Age: 64
LOS: 8 days | Discharge: HOME HEALTH | DRG: 280 | End: 2024-08-07
Attending: EMERGENCY MEDICINE | Admitting: HOSPITALIST
Payer: COMMERCIAL

## 2024-07-30 DIAGNOSIS — T14.8XXA WOUND INFECTION: ICD-10-CM

## 2024-07-30 DIAGNOSIS — I50.33 ACUTE ON CHRONIC HEART FAILURE WITH PRESERVED EJECTION FRACTION (H): ICD-10-CM

## 2024-07-30 DIAGNOSIS — R06.02 SHORTNESS OF BREATH: ICD-10-CM

## 2024-07-30 DIAGNOSIS — N18.9 CHRONIC KIDNEY DISEASE, UNSPECIFIED CKD STAGE: ICD-10-CM

## 2024-07-30 DIAGNOSIS — G60.0 CHARCOT-MARIE-TOOTH DISEASE TYPE 1A: ICD-10-CM

## 2024-07-30 DIAGNOSIS — R09.02 HYPOXIA: ICD-10-CM

## 2024-07-30 DIAGNOSIS — L08.9 WOUND INFECTION: ICD-10-CM

## 2024-07-30 DIAGNOSIS — J90 PLEURAL EFFUSION: Primary | ICD-10-CM

## 2024-07-30 DIAGNOSIS — I10 ESSENTIAL HYPERTENSION: ICD-10-CM

## 2024-07-30 LAB
ALBUMIN SERPL BCG-MCNC: 3.3 G/DL (ref 3.5–5.2)
ALP SERPL-CCNC: 57 U/L (ref 40–150)
ALT SERPL W P-5'-P-CCNC: 17 U/L (ref 0–50)
ANION GAP SERPL CALCULATED.3IONS-SCNC: 8 MMOL/L (ref 7–15)
AST SERPL W P-5'-P-CCNC: 28 U/L (ref 0–45)
BASOPHILS # BLD AUTO: 0 10E3/UL (ref 0–0.2)
BASOPHILS NFR BLD AUTO: 1 %
BILIRUB SERPL-MCNC: 0.3 MG/DL
BUN SERPL-MCNC: 30.2 MG/DL (ref 8–23)
CALCIUM SERPL-MCNC: 8.8 MG/DL (ref 8.8–10.4)
CHLORIDE SERPL-SCNC: 104 MMOL/L (ref 98–107)
CREAT SERPL-MCNC: 1.05 MG/DL (ref 0.51–0.95)
D DIMER PPP FEU-MCNC: 12.92 UG/ML FEU (ref 0–0.5)
EGFRCR SERPLBLD CKD-EPI 2021: 59 ML/MIN/1.73M2
EOSINOPHIL # BLD AUTO: 0.3 10E3/UL (ref 0–0.7)
EOSINOPHIL NFR BLD AUTO: 3 %
ERYTHROCYTE [DISTWIDTH] IN BLOOD BY AUTOMATED COUNT: 15.4 % (ref 10–15)
GLUCOSE SERPL-MCNC: 88 MG/DL (ref 70–99)
HCO3 SERPL-SCNC: 24 MMOL/L (ref 22–29)
HCT VFR BLD AUTO: 28.9 % (ref 35–47)
HGB BLD-MCNC: 9.4 G/DL (ref 11.7–15.7)
HOLD SPECIMEN: NORMAL
IMM GRANULOCYTES # BLD: 0 10E3/UL
IMM GRANULOCYTES NFR BLD: 0 %
LYMPHOCYTES # BLD AUTO: 1.8 10E3/UL (ref 0.8–5.3)
LYMPHOCYTES NFR BLD AUTO: 21 %
MCH RBC QN AUTO: 32.1 PG (ref 26.5–33)
MCHC RBC AUTO-ENTMCNC: 32.5 G/DL (ref 31.5–36.5)
MCV RBC AUTO: 99 FL (ref 78–100)
MONOCYTES # BLD AUTO: 0.4 10E3/UL (ref 0–1.3)
MONOCYTES NFR BLD AUTO: 4 %
NEUTROPHILS # BLD AUTO: 6.3 10E3/UL (ref 1.6–8.3)
NEUTROPHILS NFR BLD AUTO: 72 %
NRBC # BLD AUTO: 0 10E3/UL
NRBC BLD AUTO-RTO: 0 /100
NT-PROBNP SERPL-MCNC: ABNORMAL PG/ML (ref 0–900)
PLATELET # BLD AUTO: 249 10E3/UL (ref 150–450)
POTASSIUM SERPL-SCNC: 5 MMOL/L (ref 3.4–5.3)
PROT SERPL-MCNC: 6.6 G/DL (ref 6.4–8.3)
RBC # BLD AUTO: 2.93 10E6/UL (ref 3.8–5.2)
SODIUM SERPL-SCNC: 136 MMOL/L (ref 135–145)
TROPONIN T SERPL HS-MCNC: 80 NG/L
WBC # BLD AUTO: 8.8 10E3/UL (ref 4–11)

## 2024-07-30 PROCEDURE — 99223 1ST HOSP IP/OBS HIGH 75: CPT | Mod: AI | Performed by: HOSPITALIST

## 2024-07-30 PROCEDURE — 250N000013 HC RX MED GY IP 250 OP 250 PS 637: Performed by: HOSPITALIST

## 2024-07-30 PROCEDURE — 120N000001 HC R&B MED SURG/OB

## 2024-07-30 PROCEDURE — 85379 FIBRIN DEGRADATION QUANT: CPT | Performed by: HOSPITALIST

## 2024-07-30 PROCEDURE — 99285 EMERGENCY DEPT VISIT HI MDM: CPT

## 2024-07-30 PROCEDURE — 250N000009 HC RX 250

## 2024-07-30 PROCEDURE — 250N000011 HC RX IP 250 OP 636: Mod: JZ | Performed by: EMERGENCY MEDICINE

## 2024-07-30 PROCEDURE — 250N000011 HC RX IP 250 OP 636: Performed by: HOSPITALIST

## 2024-07-30 PROCEDURE — 83880 ASSAY OF NATRIURETIC PEPTIDE: CPT

## 2024-07-30 PROCEDURE — 93005 ELECTROCARDIOGRAM TRACING: CPT

## 2024-07-30 PROCEDURE — 71046 X-RAY EXAM CHEST 2 VIEWS: CPT

## 2024-07-30 PROCEDURE — 84484 ASSAY OF TROPONIN QUANT: CPT

## 2024-07-30 PROCEDURE — 85025 COMPLETE CBC W/AUTO DIFF WBC: CPT

## 2024-07-30 PROCEDURE — 99418 PROLNG IP/OBS E/M EA 15 MIN: CPT | Performed by: HOSPITALIST

## 2024-07-30 PROCEDURE — 250N000011 HC RX IP 250 OP 636

## 2024-07-30 PROCEDURE — 36415 COLL VENOUS BLD VENIPUNCTURE: CPT

## 2024-07-30 PROCEDURE — 250N000013 HC RX MED GY IP 250 OP 250 PS 637: Performed by: EMERGENCY MEDICINE

## 2024-07-30 PROCEDURE — 999N000040 HC STATISTIC CONSULT NO CHARGE VASC ACCESS

## 2024-07-30 PROCEDURE — 82040 ASSAY OF SERUM ALBUMIN: CPT

## 2024-07-30 PROCEDURE — 93970 EXTREMITY STUDY: CPT

## 2024-07-30 RX ORDER — FUROSEMIDE 10 MG/ML
40 INJECTION INTRAMUSCULAR; INTRAVENOUS
Status: DISCONTINUED | OUTPATIENT
Start: 2024-07-30 | End: 2024-07-31

## 2024-07-30 RX ORDER — HYDRALAZINE HYDROCHLORIDE 10 MG/1
10 TABLET, FILM COATED ORAL 3 TIMES DAILY
Status: DISCONTINUED | OUTPATIENT
Start: 2024-07-30 | End: 2024-08-02

## 2024-07-30 RX ORDER — PROCHLORPERAZINE 25 MG
12.5 SUPPOSITORY, RECTAL RECTAL EVERY 12 HOURS PRN
Status: DISCONTINUED | OUTPATIENT
Start: 2024-07-30 | End: 2024-08-07 | Stop reason: HOSPADM

## 2024-07-30 RX ORDER — CARVEDILOL 12.5 MG/1
12.5 TABLET ORAL ONCE
Status: COMPLETED | OUTPATIENT
Start: 2024-07-30 | End: 2024-07-30

## 2024-07-30 RX ORDER — CALCIUM CARBONATE 500 MG/1
1000 TABLET, CHEWABLE ORAL 4 TIMES DAILY PRN
Status: DISCONTINUED | OUTPATIENT
Start: 2024-07-30 | End: 2024-08-07 | Stop reason: HOSPADM

## 2024-07-30 RX ORDER — MORPHINE SULFATE 4 MG/ML
4 INJECTION, SOLUTION INTRAMUSCULAR; INTRAVENOUS
Status: COMPLETED | OUTPATIENT
Start: 2024-07-30 | End: 2024-07-30

## 2024-07-30 RX ORDER — AMLODIPINE BESYLATE 10 MG/1
10 TABLET ORAL DAILY
Qty: 90 TABLET | Refills: 0 | Status: SHIPPED | OUTPATIENT
Start: 2024-07-30

## 2024-07-30 RX ORDER — FUROSEMIDE 10 MG/ML
40 INJECTION INTRAMUSCULAR; INTRAVENOUS ONCE
Status: DISCONTINUED | OUTPATIENT
Start: 2024-07-30 | End: 2024-07-30

## 2024-07-30 RX ORDER — ROSUVASTATIN CALCIUM 10 MG/1
10 TABLET, COATED ORAL AT BEDTIME
Status: DISCONTINUED | OUTPATIENT
Start: 2024-07-30 | End: 2024-08-07 | Stop reason: HOSPADM

## 2024-07-30 RX ORDER — ACETAMINOPHEN 650 MG/1
650 SUPPOSITORY RECTAL EVERY 4 HOURS PRN
Status: DISCONTINUED | OUTPATIENT
Start: 2024-07-30 | End: 2024-08-07 | Stop reason: HOSPADM

## 2024-07-30 RX ORDER — LIDOCAINE/PRILOCAINE 2.5 %-2.5%
1 CREAM (GRAM) TOPICAL ONCE
Status: COMPLETED | OUTPATIENT
Start: 2024-07-30 | End: 2024-07-30

## 2024-07-30 RX ORDER — ENOXAPARIN SODIUM 100 MG/ML
1 INJECTION SUBCUTANEOUS EVERY 12 HOURS
Status: DISCONTINUED | OUTPATIENT
Start: 2024-07-30 | End: 2024-07-31

## 2024-07-30 RX ORDER — FLUTICASONE FUROATE AND VILANTEROL 200; 25 UG/1; UG/1
1 POWDER RESPIRATORY (INHALATION) DAILY
Status: DISCONTINUED | OUTPATIENT
Start: 2024-07-31 | End: 2024-08-07 | Stop reason: HOSPADM

## 2024-07-30 RX ORDER — HYDRALAZINE HYDROCHLORIDE 20 MG/ML
10 INJECTION INTRAMUSCULAR; INTRAVENOUS ONCE
Status: COMPLETED | OUTPATIENT
Start: 2024-07-30 | End: 2024-07-30

## 2024-07-30 RX ORDER — CLOPIDOGREL BISULFATE 75 MG/1
75 TABLET ORAL DAILY
Status: DISCONTINUED | OUTPATIENT
Start: 2024-07-31 | End: 2024-08-07 | Stop reason: HOSPADM

## 2024-07-30 RX ORDER — CARVEDILOL 12.5 MG/1
12.5 TABLET ORAL 2 TIMES DAILY WITH MEALS
Qty: 180 TABLET | Refills: 0 | Status: ON HOLD | OUTPATIENT
Start: 2024-07-30 | End: 2024-08-07

## 2024-07-30 RX ORDER — GABAPENTIN 100 MG/1
200 CAPSULE ORAL AT BEDTIME
Status: DISCONTINUED | OUTPATIENT
Start: 2024-07-30 | End: 2024-08-07 | Stop reason: HOSPADM

## 2024-07-30 RX ORDER — AMOXICILLIN 250 MG
1 CAPSULE ORAL 2 TIMES DAILY PRN
Status: DISCONTINUED | OUTPATIENT
Start: 2024-07-30 | End: 2024-08-07 | Stop reason: HOSPADM

## 2024-07-30 RX ORDER — CETIRIZINE HYDROCHLORIDE 5 MG/1
5 TABLET ORAL DAILY
Status: DISCONTINUED | OUTPATIENT
Start: 2024-07-31 | End: 2024-08-07 | Stop reason: HOSPADM

## 2024-07-30 RX ORDER — LABETALOL HYDROCHLORIDE 5 MG/ML
20 INJECTION, SOLUTION INTRAVENOUS ONCE
Status: COMPLETED | OUTPATIENT
Start: 2024-07-30 | End: 2024-07-30

## 2024-07-30 RX ORDER — ONDANSETRON 4 MG/1
4 TABLET, ORALLY DISINTEGRATING ORAL EVERY 6 HOURS PRN
Status: DISCONTINUED | OUTPATIENT
Start: 2024-07-30 | End: 2024-08-07 | Stop reason: HOSPADM

## 2024-07-30 RX ORDER — CARVEDILOL 12.5 MG/1
12.5 TABLET ORAL 2 TIMES DAILY WITH MEALS
Status: DISCONTINUED | OUTPATIENT
Start: 2024-07-30 | End: 2024-08-02

## 2024-07-30 RX ORDER — ACETAMINOPHEN 325 MG/1
650 TABLET ORAL EVERY 4 HOURS PRN
Status: DISCONTINUED | OUTPATIENT
Start: 2024-07-30 | End: 2024-08-07 | Stop reason: HOSPADM

## 2024-07-30 RX ORDER — OXYCODONE HYDROCHLORIDE 5 MG/1
5 TABLET ORAL ONCE
Status: COMPLETED | OUTPATIENT
Start: 2024-07-30 | End: 2024-07-30

## 2024-07-30 RX ORDER — NICOTINE 21 MG/24HR
1 PATCH, TRANSDERMAL 24 HOURS TRANSDERMAL DAILY
Status: DISCONTINUED | OUTPATIENT
Start: 2024-07-30 | End: 2024-08-02

## 2024-07-30 RX ORDER — PROCHLORPERAZINE MALEATE 5 MG
5 TABLET ORAL EVERY 6 HOURS PRN
Status: DISCONTINUED | OUTPATIENT
Start: 2024-07-30 | End: 2024-08-07 | Stop reason: HOSPADM

## 2024-07-30 RX ORDER — AMLODIPINE BESYLATE 10 MG/1
10 TABLET ORAL DAILY
Status: DISCONTINUED | OUTPATIENT
Start: 2024-07-31 | End: 2024-08-07 | Stop reason: HOSPADM

## 2024-07-30 RX ORDER — LIDOCAINE 40 MG/G
CREAM TOPICAL
Status: DISCONTINUED | OUTPATIENT
Start: 2024-07-30 | End: 2024-08-07 | Stop reason: HOSPADM

## 2024-07-30 RX ORDER — FAMOTIDINE 10 MG
10 TABLET ORAL DAILY
Status: DISCONTINUED | OUTPATIENT
Start: 2024-07-31 | End: 2024-08-07 | Stop reason: HOSPADM

## 2024-07-30 RX ORDER — ONDANSETRON 2 MG/ML
4 INJECTION INTRAMUSCULAR; INTRAVENOUS EVERY 6 HOURS PRN
Status: DISCONTINUED | OUTPATIENT
Start: 2024-07-30 | End: 2024-08-07 | Stop reason: HOSPADM

## 2024-07-30 RX ORDER — AMOXICILLIN 250 MG
2 CAPSULE ORAL 2 TIMES DAILY PRN
Status: DISCONTINUED | OUTPATIENT
Start: 2024-07-30 | End: 2024-08-07 | Stop reason: HOSPADM

## 2024-07-30 RX ORDER — POLYETHYLENE GLYCOL 3350 17 G/17G
17 POWDER, FOR SOLUTION ORAL DAILY
Status: DISCONTINUED | OUTPATIENT
Start: 2024-07-30 | End: 2024-08-07 | Stop reason: HOSPADM

## 2024-07-30 RX ORDER — ALBUTEROL SULFATE 0.83 MG/ML
2.5 SOLUTION RESPIRATORY (INHALATION)
Status: DISCONTINUED | OUTPATIENT
Start: 2024-07-30 | End: 2024-08-07 | Stop reason: HOSPADM

## 2024-07-30 RX ORDER — ALBUTEROL SULFATE 5 MG/ML
2.5 SOLUTION RESPIRATORY (INHALATION)
Status: DISCONTINUED | OUTPATIENT
Start: 2024-07-30 | End: 2024-07-30

## 2024-07-30 RX ORDER — GABAPENTIN 100 MG/1
200 CAPSULE ORAL AT BEDTIME
Qty: 180 CAPSULE | Refills: 0 | Status: SHIPPED | OUTPATIENT
Start: 2024-07-30

## 2024-07-30 RX ORDER — ACETAMINOPHEN 500 MG
500-1000 TABLET ORAL EVERY 6 HOURS PRN
Status: DISCONTINUED | OUTPATIENT
Start: 2024-07-30 | End: 2024-07-30

## 2024-07-30 RX ORDER — ONDANSETRON 2 MG/ML
4 INJECTION INTRAMUSCULAR; INTRAVENOUS EVERY 30 MIN PRN
Status: DISCONTINUED | OUTPATIENT
Start: 2024-07-30 | End: 2024-07-30

## 2024-07-30 RX ORDER — FUROSEMIDE 10 MG/ML
60 INJECTION INTRAMUSCULAR; INTRAVENOUS ONCE
Status: COMPLETED | OUTPATIENT
Start: 2024-07-30 | End: 2024-07-30

## 2024-07-30 RX ADMIN — FUROSEMIDE 60 MG: 10 INJECTION, SOLUTION INTRAMUSCULAR; INTRAVENOUS at 14:04

## 2024-07-30 RX ADMIN — ACETAMINOPHEN 650 MG: 325 TABLET, FILM COATED ORAL at 19:12

## 2024-07-30 RX ADMIN — ONDANSETRON 4 MG: 2 INJECTION INTRAMUSCULAR; INTRAVENOUS at 15:15

## 2024-07-30 RX ADMIN — HYDRALAZINE HYDROCHLORIDE 10 MG: 20 INJECTION INTRAMUSCULAR; INTRAVENOUS at 17:47

## 2024-07-30 RX ADMIN — HYDRALAZINE HYDROCHLORIDE 10 MG: 20 INJECTION INTRAMUSCULAR; INTRAVENOUS at 14:01

## 2024-07-30 RX ADMIN — NITROGLYCERIN 30 MG: 20 OINTMENT TOPICAL at 15:46

## 2024-07-30 RX ADMIN — HYDRALAZINE HYDROCHLORIDE 10 MG: 10 TABLET ORAL at 22:20

## 2024-07-30 RX ADMIN — FUROSEMIDE 40 MG: 10 INJECTION, SOLUTION INTRAMUSCULAR; INTRAVENOUS at 22:20

## 2024-07-30 RX ADMIN — ROSUVASTATIN CALCIUM 10 MG: 10 TABLET, FILM COATED ORAL at 22:20

## 2024-07-30 RX ADMIN — MORPHINE SULFATE 4 MG: 4 INJECTION, SOLUTION INTRAMUSCULAR; INTRAVENOUS at 17:14

## 2024-07-30 RX ADMIN — CARVEDILOL 12.5 MG: 12.5 TABLET, FILM COATED ORAL at 17:10

## 2024-07-30 RX ADMIN — POLYETHYLENE GLYCOL 3350 17 G: 17 POWDER, FOR SOLUTION ORAL at 22:19

## 2024-07-30 RX ADMIN — MORPHINE SULFATE 4 MG: 4 INJECTION, SOLUTION INTRAMUSCULAR; INTRAVENOUS at 15:16

## 2024-07-30 RX ADMIN — CARVEDILOL 12.5 MG: 12.5 TABLET, FILM COATED ORAL at 22:20

## 2024-07-30 RX ADMIN — ENOXAPARIN SODIUM 50 MG: 60 INJECTION SUBCUTANEOUS at 20:59

## 2024-07-30 RX ADMIN — LIDOCAINE AND PRILOCAINE 1 G: 25; 25 CREAM TOPICAL at 12:36

## 2024-07-30 RX ADMIN — GABAPENTIN 200 MG: 100 CAPSULE ORAL at 22:20

## 2024-07-30 RX ADMIN — LABETALOL HYDROCHLORIDE 20 MG: 5 INJECTION INTRAVENOUS at 15:13

## 2024-07-30 RX ADMIN — OXYCODONE HYDROCHLORIDE 5 MG: 5 TABLET ORAL at 23:59

## 2024-07-30 RX ADMIN — NICOTINE 1 PATCH: 21 PATCH, EXTENDED RELEASE TRANSDERMAL at 20:59

## 2024-07-30 ASSESSMENT — ACTIVITIES OF DAILY LIVING (ADL)
ADLS_ACUITY_SCORE: 38
ADLS_ACUITY_SCORE: 35
ADLS_ACUITY_SCORE: 35
ADLS_ACUITY_SCORE: 37
ADLS_ACUITY_SCORE: 38
ADLS_ACUITY_SCORE: 35
ADLS_ACUITY_SCORE: 35
ADLS_ACUITY_SCORE: 38

## 2024-07-30 ASSESSMENT — COLUMBIA-SUICIDE SEVERITY RATING SCALE - C-SSRS
1. IN THE PAST MONTH, HAVE YOU WISHED YOU WERE DEAD OR WISHED YOU COULD GO TO SLEEP AND NOT WAKE UP?: NO
2. HAVE YOU ACTUALLY HAD ANY THOUGHTS OF KILLING YOURSELF IN THE PAST MONTH?: NO
6. HAVE YOU EVER DONE ANYTHING, STARTED TO DO ANYTHING, OR PREPARED TO DO ANYTHING TO END YOUR LIFE?: NO

## 2024-07-30 NOTE — ED TRIAGE NOTES
BIBA from home, SOB, used inhalers at home, sats were in the mid 80s, no AC at home - was hot per medics. Fall couple nights ago, on blood thinners, recently had left above knee amputation. Medics gave duoneb with improvement. Hx of COPD, pt smokes. Respirations regular and non labored on arrive. Glucose was 121.

## 2024-07-30 NOTE — H&P
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  Evaluation in the ER was concerning for congestive heart failure.  She was given a dose of IV Lasix.  Blood pressure was significantly elevated and she was given IV labetalol and hydralazine to help bring it down.  Blood pressure remained mildly elevated and she was given Nitropaste.  The hospitalist service was contacted to admit her for further evaluation and management.    Shortness of breath  Acute on chronic diastolic heart failure  Possible pulmonary embolism  Presented with worsening shortness of breath over several weeks, especially in the 2 days prior to admission.  Recent hospital admission for acute hypoxic respiratory failure, multifactorial.  Discharged home on Bumex 1 mg 3 times daily for 14 days, she completed this little over a week ago and has been off diuretics since then.  No fevers or cough, very low suspicion for infectious etiology.  Left lower extremity Doppler ultrasound negative.  Suspect symptoms are related to CHF.  Admit to inpatient.  Cardiology consulted, appreciate their assistance.  Continue diuresis with Lasix 40 mg IV twice daily.  Monitor intake and output and daily weights.  D-dimer significantly elevated at 12.9. LLE ultrasound negative for DVT/PE.  History of severe contrast allergy.  Plan for VQ scan in AM.  Discussed D-dimer results, overall intermediate suspicion for pulmonary embolism with patient.  CT scan is not an option due to  severe contrast allergy.  Will plan for VQ scan tomorrow.  Discussed risks/benefits of anticoagulation with patient.  She would like to proceed wit anticoagulation while awaiting results of VQ scan.  Continue supplemental oxygen, wean as tolerated.  Occupational Therapy consulted, appreciate their assistance.    Hypertension  Blood pressure significantly elevated in the ER, somewhat improved prior to admission.  Continue PTA amlodipine, carvedilol, and hydralazine.  As needed IV hydralazine and labetalol are available for significantly elevated blood pressures.    Hyperlipidemia  Continue PTA rosuvastatin.    Coronary artery disease  Peripheral vascular disease  Prior history of MI in 2019.  History of 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 2024.  Continue PTA clopidogrel, carvedilol, and rosuvastatin.  Cardiology consulted as noted above.    Tobacco use  Has cut down to around 5 cigarettes/day.  21 mg nicotine patch ordered per discussion with patient.  Briefly counseled on tobacco cessation.    Chronic kidney disease stage III  Creatinine 1.05 in the ER, improved from recent baseline of around 1.9-2.5.  Earlier this year, baseline creatinine seem to be around 0.7-0.8.  Gentle IV fluids overnight.  Avoid nephrotoxic medications.  Recheck labs in AM.    Extranodal marginal zone lymphoma of mucosa associated lymphoid tissue  Continue outpatient follow-up with [facility].    Discoid lupus erythematosus  Noted.  Continue outpatient follow-up as previously arranged.    Depression  Anxiety  Does not appear to be on any medication for this as an outpatient.  Noted.  Monitor.    GERD  Continue PTA famotidine.    Anemia  Baseline hemoglobin has been around 8-9 over the last few months.  Hemoglobin stable/slightly improved at 9.4 on 7/30/2024.  Recheck CBC in AM.    History of thrombocytopenia  Platelets in the 110s to 140s earlier this month.  Platelets within normal range at 249 on 7/30/2024.  Recheck CBC in AM.          Diet:  Combination Diet 2 gm NA Diet    DVT Prophylaxis: Pneumatic Compression Devices  Alvarez Catheter: Not present  Lines: None     Cardiac Monitoring: None  Code Status:  FULL CODE per discussion with patient    Clinically Significant Risk Factors Present on Admission              # Hypoalbuminemia: Lowest albumin = 3.3 g/dL at 7/30/2024 12:33 PM, will monitor as appropriate   # Drug Induced Platelet Defect: home medication list includes an antiplatelet medication   # Hypertension: Noted on problem list    # Anemia: based on hgb <11           # Financial/Environmental Concerns:           Disposition Plan     Medically Ready for Discharge: Anticipated in 2-4 Days           New Silva MD  Hospitalist Service  [facility]  Securely message with [employer] (more info)  Text page via [facility] Paging/Directory     ______________________________________________________________________    Chief Complaint   Shortness of breath    History is obtained from the patient    History of Present Illness   Shirley Hendricks is a 63 year old female with a history of COPD, tobacco use, hypertension, hyperlipidemia, coronary artery disease, peripheral vascular disease, chronic kidney disease stage III, 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  She has been feeling more short of breath over the last few weeks, but especially over the last couple days.  She attributes the shortness of breath to a change in her inhaler from Breo ellipta to Symbicort.  Of note she was recently hospitalized from June 21 through July 8 due to acute hypoxic respiratory failure secondary to left mainstem bronchial obstruction due to mucous plugging, bilateral pleural effusions (left greater than right), acute kidney failure briefly requiring dialysis, diastolic congestive  heart failure exacerbation.  She was discharged home on Bumex 1 mg 3 times daily for 14 days, she stopped this a little over a week ago after completing the 14 days and is not currently on a diuretic.  She was seen in the ER on 7/21/2024 due to shortness of breath.  At that time she was felt to have a mild COPD exacerbation which improved after being given 1 DuoNeb.  She was sent home from the ER with a 5-day course of prednisone which she completed as directed.  Symptoms improved briefly after her ER visit but she has developed shortness of breath which has been especially bad over the last 2 days.  Denies fevers, chills, sweats, chest pain, nausea, abdominal pain, diarrhea, or urinary symptoms.    Due to the ongoing shortness of breath she presented to the ER today for evaluation.  In the ER she was seen by Dr. Braswell and NOELLE Tang CNP.  She was markedly hypertensive.  Mildly hypoxic on room air, improved with 2 L/min of supplemental oxygen.  Other vitals okay as documented in epic.  Labs showed improvement in previously elevated creatinine, significantly elevated BNP of 32,028, chronic anemia hemoglobin 9.4, elevated D-dimer of 12.92.  Left lower extremity Doppler ultrasound was negative for DVT.  Chest x-ray showed moderate left and small right pleural effusions.  She was given IV Lasix as well as medication for her hypertension and the hospitalist service was contacted to admit her for further evaluation and management.    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 clot    Post-menopausal     Reflux esophagitis 02/2004    EGD: esophagitis and medium HH    SBO (small bowel obstruction) (H) 08/10/2021    SVT (supraventricular tachycardia) (H24)     Thrombocytopenia (H24)     Uncomplicated asthma     Vitamin C deficiency 08/12/2018     Past Surgical History   Past Surgical History:   Procedure Laterality Date    AMPUTATE LEG ABOVE KNEE N/A 4/1/2024    Procedure: AMPUTATION, ABOVE KNEE right leg with wound vac dressing, excision of anteriotibial bypass graft, closure of (previous interventional radiology) left groin incision;  Surgeon: José Luis Hernandez MD;  Location:  OR    AMPUTATE LEG ABOVE KNEE Right 4/9/2024    Procedure: COMPLETION RIGHT ABOVE KNEE AMPUTATION;  Surgeon: José Luis Hernandez MD;  Location:  OR    ANGIOGRAM      ANGIOGRAM Right 6/23/2021    Procedure: RIGHT LOWER EXTREMITY ANGIOGRAM WITH LEFT BRACHIAL ARTERY CUTDOWN;  Surgeon: José Luis Hernandez MD;  Location:  OR    APPLY WOUND VAC Right 5/16/2024    Procedure: PLACEMENT OF WOUND VAC TO RIGHT ABOVE KNEE AMPUTATION;  Surgeon: Tay Enciso MD;  Location:  OR    APPLY WOUND VAC N/A 5/20/2024    Procedure: AND PLACEMENT OF WOUND VAC;  Surgeon: José Luis Hernandez MD;  Location:  OR    BIOPSY MASS NECK Right 10/11/2023    Procedure: Right Parotid Biopsy;  Surgeon: Randal Mendoza MD;  Location:  OR    BRONCHOSCOPY FLEXIBLE AND RIGID N/A 6/26/2024    Procedure: Bronchoscopy flexible and rigid;  Surgeon: Rod Nath MD;  Location:  GI    BYPASS GRAFT FEMOROPOPLITEAL Right 09/23/2020    Procedure: RIGHT  FEMORAL GRAFT TO ABOVE-KNEE POPLITEAL BYPASS USING CADAVERIC SUPERFICIAL FEMORAL ARTERY;  Surgeon: Chadwick Lozano MD;  Location: [facility] OR    BYPASS GRAFT FEMOROPOPLITEAL Right 2/15/2022    Procedure: RIGHT SUPERFICIAL FEMORAL ARTERY GRAFT TO BELOW KNEE POPLITEAL BYPASS WITH CADAVERIC CRYOLIFE  FEMORAL-POPLITEAL ARTERY SIZE: OUTER DIAMETER: 7MM - 6MM;  Surgeon: Chadwick Lozano;  Location: [facility] OR    BYPASS GRAFT FEMOROPOPLITEAL Right 5/26/2023    Procedure: RIGHT DISTAL SUPERFICIAL FEMORAL GRAFT TO ANTERIOR TIBIAL ARTERY WITH 26 CENTIMETER CADAVERIC SUPERFICIAL FEMORAL ARTERY GRAFT;  Surgeon: Chadwick Lozano MD;  Location: [facility] OR    BYPASS GRAFT FEMOROPOPLITEAL Right 10/11/2023    Procedure: RIGHT FEMORAL TO POPLITEAL GRAFT THROMBECTOMY;  Surgeon: Chadwick Lozano MD;  Location: [facility]    BYPASS GRAFT INSITU FEMOROPOPLITEAL Right 7/7/2021    Procedure: CREATION RIGHT FEMORAL TO POPLITEAL ARTERIAL BYPASS USING INSITU VEIN GRAFT;  Surgeon: José Luis Hernandez MD;  Location:  OR    CARDIAC CATHERIZATION  09/03/2009    multivessel CAD without target lesions, med mgmt indicated, preserved ef    CARPAL TUNNEL RELEASE RT/LT Right 05/20/2021    COLONOSCOPY N/A 12/12/2023    Procedure: Colonoscopy;  Surgeon: Corey Hoffman MD;  Location:  GI    COLONOSCOPY N/A 12/14/2023    Procedure: Colonoscopy;  Surgeon: Corey Hoffman MD;  Location:  GI    CV CORONARY ANGIOGRAM N/A 10/4/2023    Procedure: Coronary Angiogram;  Surgeon: Rogelio Ricks MD;  Location:  HEART CARDIAC CATH LAB    CV PCI N/A 10/4/2023    Procedure: Percutaneous Coronary Intervention;  Surgeon: Rogelio Ricks MD;  Location:  HEART CARDIAC CATH LAB    EMBOLECTOMY LOWER EXTREMITY Right 10/6/2023    Procedure: Right anterior tibial bypass with graft, Right tibial endarterectomy,thrombectomy, Right doraslis pedis thrombectomy, Anterior Tibial vein patch.;  Surgeon: Chadwick Lozano MD;   Location:  OR    ENDARTERECTOMY CAROTID Right 05/11/2016    Procedure: ENDARTERECTOMY CAROTID;  Surgeon: Chadwick Lozano MD;  Location:  OR    ENDARTERECTOMY CAROTID Left 06/08/2020    Procedure: LEFT CAROTID ENDARTERECTOMY with distal facal vein patch  and EEG;  Surgeon: Chadwick Lozano MD;  Location:  OR    ESOPHAGOSCOPY, GASTROSCOPY, DUODENOSCOPY (EGD), COMBINED N/A 12/12/2023    Procedure: Esophagoscopy, gastroscopy, duodenoscopy (EGD), combined;  Surgeon: Corey Hoffman MD;  Location:  GI    FINE NEEDLE ASPIRATION WITHOUT IMAGING GUIDANCE Right 9/22/2023    Procedure: Fine needle aspiration without imaging guidance;  Surgeon: Kiersten Aguilera MD;  Location:  GI    FLUORESCENCE INTRAOPERATIVE VASCULAR ANGIOGRAPHY Right 12/28/2022    Procedure: RIGHT LEG ANGIOGRAM with intervention;  Surgeon: Chadwick Lozano MD;  Location:  OR    GYN SURGERY  left tube    left salpingectomy    IR ANGIOGRAM THROUGH CATHETER (ARTERIAL)  10/6/2023    IR ANGIOGRAM THROUGH CATHETER (ARTERIAL)  10/6/2023    IR ANGIOGRAM THROUGH CATHETER (ARTERIAL)  3/31/2024    IR ANGIOGRAM THROUGH CATHETER (ARTERIAL)  3/30/2024    IR CHEST TUBE PLACEMENT NON-TUNNELLED LEFT  6/23/2024    IR CVC TUNNEL PLACEMENT > 5 YRS OF AGE  4/3/2024    IR CVC TUNNEL PLACEMENT > 5 YRS OF AGE  6/23/2024    IR CVC TUNNEL REMOVAL RIGHT  5/28/2024    IR CVC TUNNEL REMOVAL RIGHT  7/3/2024    IR CVC TUNNEL REVISION RIGHT  4/15/2024    IR LOWER EXTREMITY ANGIOGRAM RIGHT  05/25/2021    IR LOWER EXTREMITY ANGIOGRAM RIGHT  10/5/2023    IR LOWER EXTREMITY ANGIOGRAM RIGHT  3/29/2024    IR OR ANGIOGRAM  6/23/2021    IR OR ANGIOGRAM  12/28/2022    IRRIGATION AND DEBRIDEMENT LOWER EXTREMITY, COMBINED Right 5/16/2024    Procedure: IRRIGATION AND DEBRIDEMENT OF RIGHT ABOVE KNEE AMPUTATION;  Surgeon: Tay Enciso MD;  Location:  OR    IRRIGATION AND DEBRIDEMENT LOWER EXTREMITY, COMBINED Right 5/20/2024    Procedure: IRRIGATION  AND DEBRIDMENT RIGHT LOWER EXTREMITY;  Surgeon: José Luis Hernandez MD;  Location:  OR    LAPAROSCOPIC CHOLECYSTECTOMY N/A 09/27/2017    Procedure: LAPAROSCOPIC CHOLECYSTECTOMY;  LAPAROSCOPIC CHOLECYSTECTOMY;  Surgeon: Jacoby Tapia MD;  Location:  [facility]    LAPAROSCOPY DIAGNOSTIC (GENERAL) N/A 8/11/2021    Procedure: Exploratory laparoscopy,  laparoscopic lysis of adhesions, laparotomy;  Surgeon: Corina Ferreira MD;  Location:  OR    OR ANGIOGRAM, LOWER EXTREMITY Right 10/11/2023    Procedure: Or Angiogram, Lower Extremity;  Surgeon: Chadwick Lozano MD;  Location:  OR    ORTHOPEDIC SURGERY      left knee surgery    REPAIR ANEURYSM FEMORAL ARTERY Left 12/28/2022    Procedure: REPAIR LEFT FEMORAL PSEUDOANEURYSM;  Surgeon: Chadwick Lozano MD;  Location:  OR    VASCULAR SURGERY  aoto bi fem  left fem-AK pop    [facility] FABRIC WRAPPING OF ABDOMINAL ANEURYSM      [facility] NONSPECIFIC PROCEDURE  12/2000    angioplasty with stent right fem. a.    [facility] NONSPECIFIC PROCEDURE  1987    sinus surgery    [facility] NONSPECIFIC PROCEDURE  09/02/2009    Emergent left groin exploration with oversewing of bleeding angiographic site. 2. Endarterectomy of common femoral-proximal superficial femoral artery with greater saphenous vein patch angioplasty.   a. [place] of accessory right greater saphenous vein.     [facility] NONSPECIFIC PROCEDURE  08/27/2009    occluded left common iliac and external iliac arteries were successfully revascularized with stenting to 8 and 7 mm      Prior to Admission Medications   Prior to Admission Medications   Prescriptions Last Dose Informant Patient Reported? Taking?   acetaminophen (TYLENOL) 500 MG tablet   No No   Sig: Take 1-2 tablets (500-1,000 mg) by mouth every 6 hours as needed for mild pain   albuterol (PROAIR HFA/PROVENTIL HFA/VENTOLIN HFA) 108 (90 Base) MCG/ACT inhaler   No No   Sig: Inhale 2 puffs into the lungs every 4 hours as needed for shortness of breath, wheezing or cough    amLODIPine (NORVASC) 10 MG tablet   No No   Sig: Take 1 tablet (10 mg) by mouth daily   budesonide-formoterol (SYMBICORT) 160-4.5 MCG/ACT Inhaler   No No   Sig: Inhale 2 puffs into the lungs two times daily Disp 3 inhalers   bumetanide (BUMEX) 1 MG tablet   No No   Sig: Take 1 tablet (1 mg) by mouth 3 times daily for 14 days   carvedilol (COREG) 12.5 MG tablet   No No   Sig: Take 1 tablet (12.5 mg) by mouth 2 times daily (with meals)   cetirizine (ZYRTEC) 5 MG tablet   No No   Sig: Take 1 tablet (5 mg) by mouth daily   clopidogrel (PLAVIX) 75 MG tablet   No No   Sig: Take 1 tablet (75 mg) by mouth daily   diphenoxylate-atropine (LOMOTIL) 2.5-0.025 MG tablet   No No   Sig: Take 1 tablet by mouth 4 times daily as needed for diarrhea   famotidine (PEPCID) 10 MG tablet   No No   Sig: Take 1 tablet (10 mg) by mouth daily for 30 days   gabapentin (NEURONTIN) 100 MG capsule   No No   Sig: Take 2 capsules (200 mg) by mouth at bedtime   hydrALAZINE (APRESOLINE) 10 MG tablet   No No   Sig: Take 1 tablet (10 mg) by mouth 3 times daily   miconazole (MICATIN) 2 % external powder   No No   Sig: Apply topically 2 times daily   nicotine (NICODERM CQ) 14 MG/24HR 24 hr patch   No No   Sig: Place 1 patch onto the skin daily   nicotine (NICODERM CQ) 14 MG/24HR 24 hr patch   No No   Sig: Place 1 patch onto the skin every 24 hours   nitroGLYcerin (NITROSTAT) 0.4 MG sublingual tablet  Self No No   Sig: Place 1 tablet (0.4 mg) under the tongue See Admin Instructions for chest pain   ondansetron (ZOFRAN ODT) 4 MG ODT tab   No No   Sig: Take 1 tablet (4 mg) by mouth every 6 hours as needed for nausea or vomiting   polyethylene glycol (MIRALAX) 17 GM/Dose powder   No No   Sig: Take 17 g by mouth daily   predniSONE (DELTASONE) 20 MG tablet   No No   Sig: Take two tablets (= 40mg) each day for 5 (five) days   rosuvastatin (CRESTOR) 10 MG tablet   No No   Sig: Take 1 tablet (10 mg) by mouth at bedtime   saccharomyces boulardii (FLORASTOR) 250  MG capsule   No No   Sig: Take 1 capsule (250 mg) by mouth 2 times daily   silver sulfADIAZINE (SILVADENE) 1 % external cream   No No   Sig: Apply topically daily   wound support modular (EXPEDITE) LIQD bottle   No No   Sig: Take 60 mLs by mouth daily      Facility-Administered Medications: None        Review of Systems    The 10 point Review of Systems is negative other than noted in the HPI or here.    Social History   I have reviewed this patient's social history and updated it with pertinent information if needed.  Social History     Tobacco Use    Smoking status: Former     Current packs/day: 0.25     Average packs/day: 0.3 packs/day for 56.6 years (14.1 ttl pk-yrs)     Types: Cigarettes     Start date: 1968    Smokeless tobacco: Never    Tobacco comments:     1/2 PPD   Vaping Use    Vaping status: Never Used   Substance Use Topics    Alcohol use: Not Currently     Comment: x1-2 yr    Drug use: Yes     Types: Marijuana     Comment: 2x per day     Family History   I have reviewed this patient's family history and updated it with pertinent information if needed.  Family History   Problem Relation Age of Onset    Cancer Mother         bladder    Cardiovascular Father         alive,multiple heart attacks    Diabetes Maternal Grandmother     Lung Cancer Maternal Grandmother     Blood Disease Brother         clotting disorder     Allergies   Allergies   Allergen Reactions    Contrast Dye Anaphylaxis     Pt reported facial and throat swelling with prior CT contrast    Pantoprazole      Protonix caused diffuse edema    Chantix [Varenicline]      Terrible dreams    Gluten Meal GI Disturbance     Pt has celiac disease    Penicillins Itching and Rash        Physical Exam   Vital Signs: Temp: 98.2  F (36.8  C) Temp src: Temporal BP: (!) 175/87 Pulse: 61   Resp: 15 SpO2: 91 % O2 Device: Nasal cannula Oxygen Delivery: 2 LPM  Weight: 0 lbs 0 oz    Constitutional: awake, alert, cooperative, no apparent distress, laying in the  ER bed  Respiratory: no increased work of breathing, clear to auscultation bilaterally, no crackles or wheezing  Cardiovascular: regular rate and rhythm, normal S1 and S2, no murmur noted  GI: normal bowel sounds, soft, non-distended, non-tender  Skin: warm, dry  Musculoskeletal: right AKA, 3+ left lower extremity edema  Neurologic: awake, alert, answers questions appropriately, moves all extremities    Medical Decision Making       90 MINUTES SPENT BY ME on the date of service doing chart review, history, exam, documentation & further activities per the note.      Data     I have personally reviewed the following data over the past 24 hrs:    8.8  \   9.4 (L)   / 249     136 104 30.2 (H) /  88   5.0 24 1.05 (H) \     ALT: 17 AST: 28 AP: 57 TBILI: 0.3   ALB: 3.3 (L) TOT PROTEIN: 6.6 LIPASE: N/A     Trop: 80 (H) BNP: 32,028 (H)       Imaging results reviewed over the past 24 hrs:   Recent Results (from the past 24 hour(s))   Chest XR,  PA & LAT    Narrative    CHEST TWO VIEWS 7/30/2024 12:47 PM     HISTORY: dyspnea, hypoxia hx COPD    COMPARISON: 7/21/2024       Impression    IMPRESSION: Moderate left and small right pleural effusions are  present, slightly worsened since the prior exam. Stable, enlarged  cardiomediastinal silhouette. Vascular calcifications are seen within  the thoracic aorta. Patchy airspace and interstitial opacities are  seen within both lungs, slightly worsened since the prior exam  suggestive of any right lower lung. Differential diagnosis includes  pulmonary edema versus multifocal pneumonia. Multilevel degenerative  changes are seen in the spine. Partially seen surgical clips within  the left upper arm.    LOIVIA BILLY MD         SYSTEM ID:  [id number]   US Lower Extremity Venous Duplex Bilateral    Narrative    VENOUS ULTRASOUND BILATERAL LEG(S) July 30, 2024 1:27 PM     HISTORY: Rule out deep vein thrombosis. Right lower extremity pain.  Recent fall on blood thinners. History of right  above-knee amputation.    COMPARISON: None.    FINDINGS: Examination of the deep veins with graded compression and  color flow Doppler with spectral wave form analysis was performed.     Right: Images show the right common femoral, profunda femoral and  proximal femoral veins are patent without deep vein thrombosis.    Left: Images show no evidence of thrombus in the bilateral common  femoral vein, femoral vein, popliteal vein or calf veins.      Impression    IMPRESSION: No deep vein thrombosis in either lower extremity.      GHASSAN COLLAZO DO         SYSTEM ID:  [id number]

## 2024-07-30 NOTE — ED PROVIDER NOTES
Emergency Department Attending Supervision Note  7/30/2024  2:15 PM      I evaluated this patient in conjunction with Emmie Rabago CNP      Briefly, the patient presented with shortness of breath for the last 2 days.  She thinks it is likely due to an inhaler, though she also has a number of close history medical conditions including heart failure, COPD, and kidney disease.  Denies any chest pain, arrives with very high blood pressure.      On my exam,   Vitals: reviewed by me  General: Pt seen on hospital [facility], [facility], cooperative, and alert to conversation  Eyes: Tracking well, clear conjunctiva BL  ENT: MMM, midline trachea.   Lungs: No tachypnea, no accessory muscle use. No respiratory distress.  Is on nasal cannula, doing relatively well, able to lie supine for EJ insertion  CV: Rate as above  MSK: no joint effusion.  No evidence of trauma  Skin: No rash  Neuro: Clear speech and no facial droop.  Psych: Not RIS, no e/o AH/VH      My impression is that this is a pleasant 63-year-old female who presents to the ER with shortness of breath and what appears to be elevated blood pressure.  Multifactor etiology suspected, importantly however, I do think patient has signs and symptoms of significant fluid overload and would benefit from Lasix and blood pressure control since she is symptomatic.  The blood pressures come down here slightly without any intervention but I do think that it is important to give her hydralazine here, and if necessary, may need to be started on nitro drip if this is not successful.  I was able to put an EJ in and she tolerated this procedure very well.  Will plan for continued workup, ultrasound shows no evidence of DVT, though PE is certainly considered, will defer VQ scan to inpatient team.  I spoke with sister at bedside who is okay with this plan as well.        Diagnosis    ICD-10-CM    1. Shortness of breath  R06.02       2. Hypoxia  R09.02       3. Chronic kidney disease,  unspecified CKD stage  N18.9             Jacoby Braswell*        Jacoby Braswell MD  07/30/24 [id number]

## 2024-07-30 NOTE — ED PROVIDER NOTES
Emergency Department Note      History of Present Illness     Chief Complaint   Fall and Shortness of Breath      HPI   Shirley Hendricks is a 63 year old female who presents emergency department for the evaluation of increased shortness of breath and hypoxia.  Past medical history includes but is not limited to hypertension, hyperlipidemia, congestive heart failure, COPD, chronic kidney disease.  Patient reports that she is been experiencing increasing shortness of breath for approximately 2 days.  She states that she thinks that this increased shortness of breath is related to a change in her inhaler.  She also reports to me that she is unsure which medications she has been taking and states that she is normally compliant with all of her medications.  She denies any recent fever, chills, nausea, vomiting or diarrhea.    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    Embolism and thrombosis of unspecified artery (H) 08/2000    Gastroesophageal reflux disease     Hyperlipidaemia     Hypertension     Lupus (H)     Mild major depression (H24) 11/07/2017    Myocardial infarction (H)     Osteoarthrosis, unspecified whether generalized or localized, unspecified site     PAD (peripheral artery disease) (H24)     Peripheral vascular disease, unspecified (H24) 12/2000    Post-menopausal     Reflux esophagitis 02/2004    SBO (small bowel obstruction) (H) 08/10/2021    SVT (supraventricular tachycardia) (H24)     Thrombocytopenia (H24)     Uncomplicated asthma     Vitamin C deficiency 08/12/2018       Medications   acetaminophen (TYLENOL) 500 MG tablet  albuterol (PROAIR HFA/PROVENTIL HFA/VENTOLIN HFA) 108 (90  Base) MCG/ACT inhaler  amLODIPine (NORVASC) 10 MG tablet  budesonide-formoterol (SYMBICORT) 160-4.5 MCG/ACT Inhaler  bumetanide (BUMEX) 1 MG tablet  carvedilol (COREG) 12.5 MG tablet  cetirizine (ZYRTEC) 5 MG tablet  clopidogrel (PLAVIX) 75 MG tablet  diphenoxylate-atropine (LOMOTIL) 2.5-0.025 MG tablet  famotidine (PEPCID) 10 MG tablet  gabapentin (NEURONTIN) 100 MG capsule  hydrALAZINE (APRESOLINE) 10 MG tablet  miconazole (MICATIN) 2 % external powder  nicotine (NICODERM CQ) 14 MG/24HR 24 hr patch  nicotine (NICODERM CQ) 14 MG/24HR 24 hr patch  nitroGLYcerin (NITROSTAT) 0.4 MG sublingual tablet  ondansetron (ZOFRAN ODT) 4 MG ODT tab  polyethylene glycol (MIRALAX) 17 GM/Dose powder  predniSONE (DELTASONE) 20 MG tablet  rosuvastatin (CRESTOR) 10 MG tablet  saccharomyces boulardii (FLORASTOR) 250 MG capsule  silver sulfADIAZINE (SILVADENE) 1 % external cream  wound support modular (EXPEDITE) LIQD bottle        Surgical History   Past Surgical History:   Procedure Laterality Date    AMPUTATE LEG ABOVE KNEE N/A 4/1/2024    Procedure: AMPUTATION, ABOVE KNEE right leg with wound vac dressing, excision of anteriotibial bypass graft, closure of (previous interventional radiology) left groin incision;  Surgeon: José Luis Hernandez MD;  Location:  OR    AMPUTATE LEG ABOVE KNEE Right 4/9/2024    Procedure: COMPLETION RIGHT ABOVE KNEE AMPUTATION;  Surgeon: José Luis Hernandez MD;  Location:  OR    ANGIOGRAM      ANGIOGRAM Right 6/23/2021    Procedure: RIGHT LOWER EXTREMITY ANGIOGRAM WITH LEFT BRACHIAL ARTERY CUTDOWN;  Surgeon: José Luis Hernandez MD;  Location:  OR    APPLY WOUND VAC Right 5/16/2024    Procedure: PLACEMENT OF WOUND VAC TO RIGHT ABOVE KNEE AMPUTATION;  Surgeon: Tay Enciso MD;  Location:  OR    APPLY WOUND VAC N/A 5/20/2024    Procedure: AND PLACEMENT OF WOUND VAC;  Surgeon: José Luis Hernandez MD;  Location: [facility] OR    BIOPSY MASS NECK Right 10/11/2023    Procedure: Right Parotid  Biopsy;  Surgeon: Randal Mendoza MD;  Location:  OR    BRONCHOSCOPY FLEXIBLE AND RIGID N/A 6/26/2024    Procedure: Bronchoscopy flexible and rigid;  Surgeon: Rod Nath MD;  Location:  GI    BYPASS GRAFT FEMOROPOPLITEAL Right 09/23/2020    Procedure: RIGHT FEMORAL GRAFT TO ABOVE-KNEE POPLITEAL BYPASS USING CADAVERIC SUPERFICIAL FEMORAL ARTERY;  Surgeon: Chadwick Lozano MD;  Location:  OR    BYPASS GRAFT FEMOROPOPLITEAL Right 2/15/2022    Procedure: RIGHT SUPERFICIAL FEMORAL ARTERY GRAFT TO BELOW KNEE POPLITEAL BYPASS WITH CADAVERIC CRYOLIFE  FEMORAL-POPLITEAL ARTERY SIZE: OUTER DIAMETER: 7MM - 6MM;  Surgeon: Chadwick Lozano;  Location:  OR    BYPASS GRAFT FEMOROPOPLITEAL Right 5/26/2023    Procedure: RIGHT DISTAL SUPERFICIAL FEMORAL GRAFT TO ANTERIOR TIBIAL ARTERY WITH 26 CENTIMETER CADAVERIC SUPERFICIAL FEMORAL ARTERY GRAFT;  Surgeon: Chadwick Lozano MD;  Location:  OR    BYPASS GRAFT FEMOROPOPLITEAL Right 10/11/2023    Procedure: RIGHT FEMORAL TO POPLITEAL GRAFT THROMBECTOMY;  Surgeon: Chadwick Lozano MD;  Location:  OR    BYPASS GRAFT INSITU FEMOROPOPLITEAL Right 7/7/2021    Procedure: CREATION RIGHT FEMORAL TO POPLITEAL ARTERIAL BYPASS USING INSITU VEIN GRAFT;  Surgeon: José Luis Hernandez MD;  Location:  OR    CARDIAC CATHERIZATION  09/03/2009    multivessel CAD without target lesions, med mgmt indicated, preserved ef    CARPAL TUNNEL RELEASE RT/LT Right 05/20/2021    COLONOSCOPY N/A 12/12/2023    Procedure: Colonoscopy;  Surgeon: Corey Hoffman MD;  Location:  GI    COLONOSCOPY N/A 12/14/2023    Procedure: Colonoscopy;  Surgeon: Corey Hoffman MD;  Location:  GI    CV CORONARY ANGIOGRAM N/A 10/4/2023    Procedure: Coronary Angiogram;  Surgeon: Rogelio Ricks MD;  Location:  HEART CARDI[facility] CATH LAB    CV PCI N/A 10/4/2023    Procedure: Percutaneous Coronary Intervention;  Surgeon: Rogelio Ricks MD;  Location:   HEART CARD[facility]AC CATH LAB    EMBOLECTOMY LOWER EXTREMITY Right 10/6/2023    Procedure: Right anterior tibial bypass with graft, Right tibial endarterectomy,thrombectomy, Right doraslis pedis thrombectomy, Anterior Tibial vein patch.;  Surgeon: Chadwick Lozano MD;  Location:  OR    ENDARTERECTOMY CAROTID Right 05/11/2016    Procedure: ENDARTERECTOMY CAROTID;  Surgeon: Chadwick Lozano MD;  Location:  OR    ENDARTERECTOMY CAROTID Left 06/08/2020    Procedure: LEFT CAROTID ENDARTERECTOMY with distal facal vein patch  and EEG;  Surgeon: Chadwick Lozano MD;  Location:  OR    ESOPHAGOSCOPY, GASTROSCOPY, DUODENOSCOPY (EGD), COMBINED N/A 12/12/2023    Procedure: Esophagoscopy, gastroscopy, duodenoscopy (EGD), combined;  Surgeon: Corey Hoffman MD;  Location:  GI    FINE NEEDLE ASPIRATION WITHOUT IMAGING GUIDANCE Right 9/22/2023    Procedure: Fine needle aspiration without imaging guidance;  Surgeon: Kiersten Aguilera MD;  Location:  GI    FLUORESCENCE INTRAOPERATIVE VASCULAR ANGIOGRAPHY Right 12/28/2022    Procedure: RIGHT LEG ANGIOGRAM with intervention;  Surgeon: Chadwick Lozano MD;  Location:  OR    GYN SURGERY  left tube    left salpingectomy    IR ANGIOGRAM THROUGH CATHETER (ARTERIAL)  10/6/2023    IR ANGIOGRAM THROUGH CATHETER (ARTERIAL)  10/6/2023    IR ANGIOGRAM THROUGH CATHETER (ARTERIAL)  3/31/2024    IR ANGIOGRAM THROUGH CATHETER (ARTERIAL)  3/30/2024    IR CHEST TUBE PLACEMENT NON-TUNNELLED LEFT  6/23/2024    IR CVC TUNNEL PLACEMENT > 5 YRS OF AGE  4/3/2024    IR CVC TUNNEL PLACEMENT > 5 YRS OF AGE  6/23/2024    IR CVC TUNNEL REMOVAL RIGHT  5/28/2024    IR CVC TUNNEL REMOVAL RIGHT  7/3/2024    IR CVC TUNNEL REVISION RIGHT  4/15/2024    IR LOWER EXTREMITY ANGIOGRAM RIGHT  05/25/2021    IR LOWER EXTREMITY ANGIOGRAM RIGHT  10/5/2023    IR LOWER EXTREMITY ANGIOGRAM RIGHT  3/29/2024    IR OR ANGIOGRAM  6/23/2021    IR OR ANGIOGRAM  12/28/2022    IRRIGATION AND  DEBRIDEMENT LOWER EXTREMITY, COMBINED Right 5/16/2024    Procedure: IRRIGATION AND DEBRIDEMENT OF RIGHT ABOVE KNEE AMPUTATION;  Surgeon: Tay Enciso MD;  Location:  OR    IRRIGATION AND DEBRIDEMENT LOWER EXTREMITY, COMBINED Right 5/20/2024    Procedure: IRRIGATION AND DEBRIDMENT RIGHT LOWER EXTREMITY;  Surgeon: José Luis Hernandez MD;  Location:  OR    LAPAROSCOPIC CHOLECYSTECTOMY N/A 09/27/2017    Procedure: LAPAROSCOPIC CHOLECYSTECTOMY;  LAPAROSCOPIC CHOLECYSTECTOMY;  Surgeon: Jacoby Tapia MD;  Location:  [facility]    LAPAROSCOPY DIAGNOSTIC (GENERAL) N/A 8/11/2021    Procedure: Exploratory laparoscopy,  laparoscopic lysis of adhesions, laparotomy;  Surgeon: Corina Ferreira MD;  Location:  OR    OR ANGIOGRAM, LOWER EXTREMITY Right 10/11/2023    Procedure: Or Angiogram, Lower Extremity;  Surgeon: Chadwick Lozano MD;  Location:  OR    ORTHOPEDIC SURGERY      left knee surgery    REPAIR ANEURYSM FEMORAL ARTERY Left 12/28/2022    Procedure: REPAIR LEFT FEMORAL PSEUDOANEURYSM;  Surgeon: Chadwick Lozano MD;  Location:  OR    VASCULAR SURGERY  aoto bi fem  left fem-AK pop    [facility] FABRIC WRAPPING OF ABDOMINAL ANEURYSM      [facility] NONSPECIFIC PROCEDURE  12/2000    angioplasty with stent right fem. a.    [facility] NONSPECIFIC PROCEDURE  1987    sinus surgery    [facility] NONSPECIFIC PROCEDURE  09/02/2009    Emergent left groin exploration with oversewing of bleeding angiographic site. 2. Endarterectomy of common femoral-proximal superficial femoral artery with greater saphenous vein patch angioplasty.   a. [place] of accessory right greater saphenous vein.     [facility] NONSPECIFIC PROCEDURE  08/27/2009    occluded left common iliac and external iliac arteries were successfully revascularized with stenting to 8 and 7 mm        Physical Exam     Patient Vitals for the past 24 hrs:   BP Temp Temp src Pulse Resp SpO2   07/30/24 1234 -- -- -- -- -- 94 %   07/30/24 1225 -- -- -- -- -- 92 %   07/30/24 1205 (!)  219/102 -- -- 70 -- 93 %   07/30/24 1155 -- -- -- -- -- 93 %   07/30/24 1140 -- -- -- -- -- 90 %   07/30/24 1125 (!) 221/104 -- -- -- -- 93 %   07/30/24 1111 -- -- -- -- 20 --   07/30/24 1110 -- -- -- -- -- 94 %   07/30/24 1104 -- 98.2  F (36.8  C) Temporal -- -- 91 %   07/30/24 1100 (!) 237/102 -- -- 77 -- (!) 88 %     Physical Exam  General: Awake, alert, non-toxic.  Head:  Scalp is NC/AT  Eyes:  Conjunctiva normal, PERRL  ENT:  The external nose and ears are normal.     Oropharynx clear, uvula midline.  Neck:  Normal range of motion without rigidity.  CV:  Regular rate and rhythm    No pathologic murmur, rubs, or gallops.  Resp:  Hypoxic, on 2L via nasal canula. Breath sounds are clear/diminished bilaterally.    Non-labored, no retractions or accessory muscle use  Abdomen: Abdomen is soft, no distension, no tenderness, no masses. No CVA tenderness.  MS:    Right Leg: AKA amputation. No redness or swelling.     Left Leg: Erythema noted to the left lower extremity.  3+ pitting edema noted.  Pulses are palpable.  CMS is intact.  No pain or tenderness noted to joints on palpation.     Skin:  Warm and dry, No rash or lesions noted.  Neuro:  Alert and oriented.  GCS 15 Moves all extremities normal.  No facial asymmetry. Gait normal.  Psych:  Awake. Alert. Normal affect. Appropriate interactions.     Diagnostics     Lab Results   Labs Ordered and Resulted from Time of ED Arrival to Time of ED Departure   COMPREHENSIVE METABOLIC PANEL - Abnormal       Result Value    Sodium 136      Potassium 5.0      Carbon Dioxide (CO2) 24      Anion Gap 8      Urea Nitrogen 30.2 (*)     Creatinine 1.05 (*)     GFR Estimate 59 (*)     Calcium 8.8      Chloride 104      Glucose 88      Alkaline Phosphatase 57      AST 28      ALT 17      Protein Total 6.6      Albumin 3.3 (*)     Bilirubin Total 0.3     NT PROBNP INPATIENT - Abnormal    N terminal Pro BNP Inpatient 32,028 (*)    CBC WITH PLATELETS AND DIFFERENTIAL - Abnormal    WBC  Count 8.8      RBC Count 2.93 (*)     Hemoglobin 9.4 (*)     Hematocrit 28.9 (*)     MCV 99      MCH 32.1      MCHC 32.5      RDW 15.4 (*)     Platelet Count 249      % Neutrophils 72      % Lymphocytes 21      % Monocytes 4      % Eosinophils 3      % Basophils 1      % Immature Granulocytes 0      NRBCs per 100 WBC 0      Absolute Neutrophils 6.3      Absolute Lymphocytes 1.8      Absolute Monocytes 0.4      Absolute Eosinophils 0.3      Absolute Basophils 0.0      Absolute Immature Granulocytes 0.0      Absolute NRBCs 0.0     TROPONIN T, HIGH SENSITIVITY       Imaging   US Lower Extremity Venous Duplex Bilateral   Preliminary Result   IMPRESSION: No deep vein thrombosis in either lower extremity.        Chest XR,  PA & LAT   Final Result   IMPRESSION: Moderate left and small right pleural effusions are   present, slightly worsened since the prior exam. Stable, enlarged   cardiomediastinal silhouette. Vascular calcifications are seen within   the thoracic aorta. Patchy airspace and interstitial opacities are   seen within both lungs, slightly worsened since the prior exam   suggestive of any right lower lung. Differential diagnosis includes   pulmonary edema versus multifocal pneumonia. Multilevel degenerative   changes are seen in the spine. Partially seen surgical clips within   the left upper arm.      OLIVIA BILLY MD            SYSTEM ID:  [id number]          EKG   ECG results from 07/21/24   EKG 12-lead, tracing only     Value    Systolic Blood Pressure     Diastolic Blood Pressure     Ventricular Rate 62    Atrial Rate 62    OK Interval 166    QRS Duration 78        QTc 414    P Axis 72    R AXIS 56    T Axis 74    Interpretation ECG      Sinus rhythm with Premature supraventricular complexes  Low voltage QRS  Cannot rule out Anterior infarct (cited on or before 03-SEP-2009)  Abnormal ECG  When compared with ECG of 26-JUN-2024 15:47,  Questionable change in initial forces of Septal leads  Confirmed by  GENERATED REPORT, COMPUTER ([id number]),  Reggie Lim ([id number]) on 7/21/2024 2:43:59 AM       *Note: Due to a large number of results and/or encounters for the requested time period, some results have not been displayed. A complete set of results can be found in Results Review.        Independent Interpretation   None    ED Course      Medications Administered   Medications   hydrALAZINE (APRESOLINE) injection 10 mg (has no administration in time range)   furosemide (LASIX) injection 60 mg (has no administration in time range)   lidocaine-prilocaine (EMLA) cream 1 g (1 g Topical $Given 7/30/24 3972)       Procedures   Procedures     Discussion of Management   Admitting Hospitalist, [place]    ED Course        Optional/Additional Documentation  None    Medical Decision Making / Diagnosis     CMS Diagnoses: None    MIPS       None    MDM   Shirley Hendricks is a 63 year old female who presents emergency department for the evaluation of increased shortness of breath.  Upon initial examination patient is awake and alert.  Patient was found to be hypoxic upon presentation to the emergency department, she is now requiring 2 L via nasal cannula to maintain her oxygen saturation.  Lungs are clear, diminished bilaterally in the bases.  Differential diagnosis includes but is not limited to acute coronary syndrome, CHF exacerbation, COPD exacerbation, and pulmonary embolism.  EKG did not reveal any acute ST elevation or depression.  BNP was found to be significantly elevated.  Patient also had 3+ pitting edema to the left lower extremity.  Patient does have significant chronic kidney disease, last GFR was 29.  Given the patient's sudden onset of hypoxia I had a suspicion for possibility of a pulmonary embolism.  But given her GFR I did not elect to perform a CT scan.  I rather obtained a ultrasound of the left lower extremity which was thankfully negative for any DVT.  This lowers my suspicion for pulmonary embolism  significantly.  Given how elevated the patient's BNP is I suspect that this shortness of breath is in the setting of acute on chronic congestive heart failure related to fluid overload.  Chest x-ray was obtained which also revealed bilateral pleural effusions.  Patient will be treated with dose of IV Lasix in the emergency department to facilitate diuresis.  These findings were discussed with the admitting hospitalist who agreed to the admission.  I discussed all findings with the patient who agreed to be admitted to the hospital.  All lab values and pertinent radiological findings were discussed with patient patient verbalized understanding.  All questions and concerns were addressed.  Patient was successfully admitted to the hospital for further evaluation and workup.    Disposition   The patient was admitted to the hospital.     Diagnosis     ICD-10-CM    1. Shortness of breath  R06.02       2. Hypoxia  R09.02       3. Chronic kidney disease, unspecified CKD stage  N18.9            Discharge Medications   New Prescriptions    No medications on file         NOELLE Tang CNP, [place], APRN CNP  07/30/24 [id number]

## 2024-07-30 NOTE — ED NOTES
Bed: ED22  Expected date:   Expected time:   Means of arrival:   Comments:  Yanet 512 64f copd eta 10

## 2024-07-30 NOTE — ED NOTES
[facility]  ED Nurse Handoff Report    ED Chief complaint: Fall and Shortness of Breath      ED Diagnosis:   Final diagnoses:   Shortness of breath   Hypoxia   Chronic kidney disease, unspecified CKD stage       Code Status: TBD by admitting physician.     Allergies:   Allergies   Allergen Reactions    Contrast Dye Anaphylaxis     Pt reported facial and throat swelling with prior CT contrast    Pantoprazole      Protonix caused diffuse edema    Chantix [Varenicline]      Terrible dreams    Gluten Meal GI Disturbance     Pt has celiac disease    Penicillins Itching and Rash       Patient Story: BIBA from home, c/o SOB, used inhalers at home w/ no relief, O2 in mid 80's on RA, not on home O2 normally. Hx COPD, pt smokes. No AC at home. Had a fall a couple nights ago, skin tears to both arms, on blood thinners. Recent L AKA.   Focused Assessment:  A/Ox4, O2 upper 90's on 2L NC. C/o SOB but respirations appear unlabored. Skin tears to both forearms, wounds cleaned and dressed in ED. Pt very difficult IV stick, R external jugular PIV placed by ED MD. Persistent HTN. Nitroglycerin paste applied to L chest. IV Lasix given, purewick in place.    Treatments and/or interventions provided:   Labs Ordered and Resulted from Time of ED Arrival to Time of ED Departure   COMPREHENSIVE METABOLIC PANEL - Abnormal       Result Value    Sodium 136      Potassium 5.0      Carbon Dioxide (CO2) 24      Anion Gap 8      Urea Nitrogen 30.2 (*)     Creatinine 1.05 (*)     GFR Estimate 59 (*)     Calcium 8.8      Chloride 104      Glucose 88      Alkaline Phosphatase 57      AST 28      ALT 17      Protein Total 6.6      Albumin 3.3 (*)     Bilirubin Total 0.3     NT PROBNP INPATIENT - Abnormal    N terminal Pro BNP Inpatient 32,028 (*)    TROPONIN T, HIGH SENSITIVITY - Abnormal    Troponin T, High Sensitivity 80 (*)    CBC WITH PLATELETS AND DIFFERENTIAL - Abnormal    WBC Count 8.8      RBC Count 2.93 (*)     Hemoglobin 9.4  (*)     Hematocrit 28.9 (*)     MCV 99      MCH 32.1      MCHC 32.5      RDW 15.4 (*)     Platelet Count 249      % Neutrophils 72      % Lymphocytes 21      % Monocytes 4      % Eosinophils 3      % Basophils 1      % Immature Granulocytes 0      NRBCs per 100 WBC 0      Absolute Neutrophils 6.3      Absolute Lymphocytes 1.8      Absolute Monocytes 0.4      Absolute Eosinophils 0.3      Absolute Basophils 0.0      Absolute Immature Granulocytes 0.0      Absolute NRBCs 0.0       US Lower Extremity Venous Duplex Bilateral   Preliminary Result   IMPRESSION: No deep vein thrombosis in either lower extremity.        Chest XR,  PA & LAT   Final Result   IMPRESSION: Moderate left and small right pleural effusions are   present, slightly worsened since the prior exam. Stable, enlarged   cardiomediastinal silhouette. Vascular calcifications are seen within   the thoracic aorta. Patchy airspace and interstitial opacities are   seen within both lungs, slightly worsened since the prior exam   suggestive of any right lower lung. Differential diagnosis includes   pulmonary edema versus multifocal pneumonia. Multilevel degenerative   changes are seen in the spine. Partially seen surgical clips within   the left upper arm.      OLIVIA BILLY MD            SYSTEM ID:  [id number]        Medications   nitroGLYcerin (NITRO-BID) 2 % ointment 30 mg (30 mg Transdermal $Patch/Med Applied 7/30/24 1546)   ondansetron (ZOFRAN) injection 4 mg (4 mg Intravenous $Given 7/30/24 1515)   albuterol (PROVENTIL) neb solution 2.5 mg (has no administration in time range)   lidocaine-prilocaine (EMLA) cream 1 g (1 g Topical $Given 7/30/24 1236)   hydrALAZINE (APRESOLINE) injection 10 mg (10 mg Intravenous $Given 7/30/24 1401)   furosemide (LASIX) injection 60 mg (60 mg Intravenous $Given 7/30/24 1404)   carvedilol (COREG) tablet 12.5 mg (12.5 mg Oral $Given 7/30/24 1710)   labetalol (NORMODYNE/TRANDATE) injection 20 mg (20 mg Intravenous $Given 7/30/24  1513)   morphine (PF) injection 4 mg (4 mg Intravenous $Given 7/30/24 1714)   hydrALAZINE (APRESOLINE) injection 10 mg (10 mg Intravenous $Given 7/30/24 1747)            Patient's response to treatments and/or interventions: Tolerated    To be done/followed up on inpatient unit:  Continue to monitor BP.    Does this patient have any cognitive concerns?:  None    Activity level - Baseline/Home:  Wheelchair  Activity Level - Current:   Unknown    Patient's Preferred language: English   Needed?: No    Isolation: None  Infection: Not Applicable  Patient tested for COVID 19 prior to admission: NO  Bariatric?: No    Vital Signs:   Vitals:    07/30/24 1234 07/30/24 1401 07/30/24 1430 07/30/24 1444   BP:  (!) 224/103 (!) 227/103 (!) 223/98   Pulse:  71 76 75   Resp:       Temp:       TempSrc:       SpO2: 94% 94% 93% 94%       Cardiac Rhythm:     Was the PSS-3 completed:   Yes  What interventions are required if any?               Family Comments: Sister at bedside  OBS brochure/video discussed/provided to patient/family: N/A           For the majority of the shift this patient's behavior was Green.   Behavioral interventions performed were N/A.    ED NURSE PHONE NUMBER: [phone]

## 2024-07-31 ENCOUNTER — APPOINTMENT (OUTPATIENT)
Dept: NUCLEAR MEDICINE | Facility: CLINIC | Age: 64
DRG: 280 | End: 2024-07-31
Attending: HOSPITALIST
Payer: COMMERCIAL

## 2024-07-31 ENCOUNTER — APPOINTMENT (OUTPATIENT)
Dept: CARDIOLOGY | Facility: CLINIC | Age: 64
DRG: 280 | End: 2024-07-31
Attending: HOSPITALIST
Payer: COMMERCIAL

## 2024-07-31 ENCOUNTER — APPOINTMENT (OUTPATIENT)
Dept: ULTRASOUND IMAGING | Facility: CLINIC | Age: 64
DRG: 280 | End: 2024-07-31
Attending: INTERNAL MEDICINE
Payer: COMMERCIAL

## 2024-07-31 ENCOUNTER — APPOINTMENT (OUTPATIENT)
Dept: CT IMAGING | Facility: CLINIC | Age: 64
DRG: 280 | End: 2024-07-31
Attending: INTERNAL MEDICINE
Payer: COMMERCIAL

## 2024-07-31 LAB
% LINING CELLS, BODY FLUID: 6 %
ANION GAP SERPL CALCULATED.3IONS-SCNC: 6 MMOL/L (ref 7–15)
ANION GAP SERPL CALCULATED.3IONS-SCNC: 8 MMOL/L (ref 7–15)
APPEARANCE FLD: CLEAR
ATRIAL RATE - MUSE: 70 BPM
BUN SERPL-MCNC: 26.8 MG/DL (ref 8–23)
BUN SERPL-MCNC: 27.4 MG/DL (ref 8–23)
CALCIUM SERPL-MCNC: 8.4 MG/DL (ref 8.8–10.4)
CALCIUM SERPL-MCNC: 8.5 MG/DL (ref 8.8–10.4)
CELL COUNT BODY FLUID SOURCE: NORMAL
CHLORIDE SERPL-SCNC: 101 MMOL/L (ref 98–107)
CHLORIDE SERPL-SCNC: 98 MMOL/L (ref 98–107)
COLOR FLD: YELLOW
CREAT SERPL-MCNC: 1.26 MG/DL (ref 0.51–0.95)
CREAT SERPL-MCNC: 1.27 MG/DL (ref 0.51–0.95)
DIASTOLIC BLOOD PRESSURE - MUSE: NORMAL MMHG
EGFRCR SERPLBLD CKD-EPI 2021: 47 ML/MIN/1.73M2
EGFRCR SERPLBLD CKD-EPI 2021: 47 ML/MIN/1.73M2
EOSINOPHIL NFR FLD MANUAL: 7 %
ERYTHROCYTE [DISTWIDTH] IN BLOOD BY AUTOMATED COUNT: 15.6 % (ref 10–15)
GLUCOSE BODY FLUID SOURCE: NORMAL
GLUCOSE FLD-MCNC: 105 MG/DL
GLUCOSE SERPL-MCNC: 78 MG/DL (ref 70–99)
GLUCOSE SERPL-MCNC: 88 MG/DL (ref 70–99)
HCO3 SERPL-SCNC: 23 MMOL/L (ref 22–29)
HCO3 SERPL-SCNC: 28 MMOL/L (ref 22–29)
HCT VFR BLD AUTO: 26.4 % (ref 35–47)
HGB BLD-MCNC: 8.3 G/DL (ref 11.7–15.7)
INTERPRETATION ECG - MUSE: NORMAL
LD BODY BODY FLUID SOURCE: NORMAL
LDH FLD L TO P-CCNC: 55 U/L
LDH SERPL L TO P-CCNC: 153 U/L (ref 0–250)
LVEF ECHO: NORMAL
LYMPHOCYTES NFR FLD MANUAL: 58 %
MCH RBC QN AUTO: 31.3 PG (ref 26.5–33)
MCHC RBC AUTO-ENTMCNC: 31.4 G/DL (ref 31.5–36.5)
MCV RBC AUTO: 100 FL (ref 78–100)
MONOS+MACROS NFR FLD MANUAL: 20 %
NEUTS BAND NFR FLD MANUAL: 9 %
P AXIS - MUSE: 11 DEGREES
PLATELET # BLD AUTO: 171 10E3/UL (ref 150–450)
POTASSIUM SERPL-SCNC: 4.5 MMOL/L (ref 3.4–5.3)
POTASSIUM SERPL-SCNC: 4.8 MMOL/L (ref 3.4–5.3)
PR INTERVAL - MUSE: 138 MS
PROT FLD-MCNC: 2.2 G/DL
PROT SERPL-MCNC: 6.1 G/DL (ref 6.4–8.3)
PROTEIN BODY FLUID SOURCE: NORMAL
QRS DURATION - MUSE: 80 MS
QT - MUSE: 384 MS
QTC - MUSE: 414 MS
R AXIS - MUSE: -3 DEGREES
RBC # BLD AUTO: 2.65 10E6/UL (ref 3.8–5.2)
SODIUM SERPL-SCNC: 132 MMOL/L (ref 135–145)
SODIUM SERPL-SCNC: 132 MMOL/L (ref 135–145)
SYSTOLIC BLOOD PRESSURE - MUSE: NORMAL MMHG
T AXIS - MUSE: 44 DEGREES
VENTRICULAR RATE- MUSE: 70 BPM
WBC # BLD AUTO: 5.8 10E3/UL (ref 4–11)
WBC # FLD AUTO: 72 /UL

## 2024-07-31 PROCEDURE — 84155 ASSAY OF PROTEIN SERUM: CPT | Performed by: INTERNAL MEDICINE

## 2024-07-31 PROCEDURE — 93306 TTE W/DOPPLER COMPLETE: CPT

## 2024-07-31 PROCEDURE — A9567 TECHNETIUM TC-99M AEROSOL: HCPCS | Performed by: HOSPITALIST

## 2024-07-31 PROCEDURE — 250N000011 HC RX IP 250 OP 636: Performed by: INTERNAL MEDICINE

## 2024-07-31 PROCEDURE — 80048 BASIC METABOLIC PNL TOTAL CA: CPT | Performed by: HOSPITALIST

## 2024-07-31 PROCEDURE — 82945 GLUCOSE OTHER FLUID: CPT | Performed by: INTERNAL MEDICINE

## 2024-07-31 PROCEDURE — 250N000011 HC RX IP 250 OP 636: Performed by: HOSPITALIST

## 2024-07-31 PROCEDURE — 93306 TTE W/DOPPLER COMPLETE: CPT | Mod: 26 | Performed by: INTERNAL MEDICINE

## 2024-07-31 PROCEDURE — 80048 BASIC METABOLIC PNL TOTAL CA: CPT | Performed by: INTERNAL MEDICINE

## 2024-07-31 PROCEDURE — 71250 CT THORAX DX C-: CPT

## 2024-07-31 PROCEDURE — 89050 BODY FLUID CELL COUNT: CPT | Performed by: INTERNAL MEDICINE

## 2024-07-31 PROCEDURE — 99207 PR APP CREDIT; MD BILLING SHARED VISIT: CPT | Performed by: NURSE PRACTITIONER

## 2024-07-31 PROCEDURE — 32555 ASPIRATE PLEURA W/ IMAGING: CPT

## 2024-07-31 PROCEDURE — 99223 1ST HOSP IP/OBS HIGH 75: CPT | Performed by: INTERNAL MEDICINE

## 2024-07-31 PROCEDURE — 36415 COLL VENOUS BLD VENIPUNCTURE: CPT | Performed by: INTERNAL MEDICINE

## 2024-07-31 PROCEDURE — 272N000035 NM LUNG SCAN VENTILATION AND PERFUSION

## 2024-07-31 PROCEDURE — 83615 LACTATE (LD) (LDH) ENZYME: CPT | Performed by: INTERNAL MEDICINE

## 2024-07-31 PROCEDURE — 120N000001 HC R&B MED SURG/OB

## 2024-07-31 PROCEDURE — 99233 SBSQ HOSP IP/OBS HIGH 50: CPT | Performed by: INTERNAL MEDICINE

## 2024-07-31 PROCEDURE — 250N000009 HC RX 250: Performed by: STUDENT IN AN ORGANIZED HEALTH CARE EDUCATION/TRAINING PROGRAM

## 2024-07-31 PROCEDURE — 250N000013 HC RX MED GY IP 250 OP 250 PS 637: Performed by: HOSPITALIST

## 2024-07-31 PROCEDURE — 85027 COMPLETE CBC AUTOMATED: CPT | Performed by: HOSPITALIST

## 2024-07-31 PROCEDURE — 36415 COLL VENOUS BLD VENIPUNCTURE: CPT | Performed by: HOSPITALIST

## 2024-07-31 PROCEDURE — G0463 HOSPITAL OUTPT CLINIC VISIT: HCPCS | Mod: 25

## 2024-07-31 PROCEDURE — 87205 SMEAR GRAM STAIN: CPT | Performed by: INTERNAL MEDICINE

## 2024-07-31 PROCEDURE — 0W9B3ZZ DRAINAGE OF LEFT PLEURAL CAVITY, PERCUTANEOUS APPROACH: ICD-10-PCS | Performed by: STUDENT IN AN ORGANIZED HEALTH CARE EDUCATION/TRAINING PROGRAM

## 2024-07-31 PROCEDURE — 250N000013 HC RX MED GY IP 250 OP 250 PS 637: Performed by: INTERNAL MEDICINE

## 2024-07-31 PROCEDURE — A9540 TC99M MAA: HCPCS | Performed by: HOSPITALIST

## 2024-07-31 PROCEDURE — 343N000001 HC RX 343: Performed by: HOSPITALIST

## 2024-07-31 PROCEDURE — 84157 ASSAY OF PROTEIN OTHER: CPT | Performed by: INTERNAL MEDICINE

## 2024-07-31 RX ORDER — FUROSEMIDE 10 MG/ML
60 INJECTION INTRAMUSCULAR; INTRAVENOUS EVERY 12 HOURS
Status: DISCONTINUED | OUTPATIENT
Start: 2024-07-31 | End: 2024-08-03

## 2024-07-31 RX ORDER — FUROSEMIDE 10 MG/ML
60 INJECTION INTRAMUSCULAR; INTRAVENOUS EVERY 8 HOURS
Status: DISCONTINUED | OUTPATIENT
Start: 2024-07-31 | End: 2024-07-31

## 2024-07-31 RX ORDER — MULTIPLE VITAMINS W/ MINERALS TAB 9MG-400MCG
1 TAB ORAL DAILY
Status: DISCONTINUED | OUTPATIENT
Start: 2024-07-31 | End: 2024-08-07 | Stop reason: HOSPADM

## 2024-07-31 RX ORDER — ENOXAPARIN SODIUM 100 MG/ML
0.75 INJECTION SUBCUTANEOUS EVERY 12 HOURS
Status: DISCONTINUED | OUTPATIENT
Start: 2024-07-31 | End: 2024-08-02

## 2024-07-31 RX ORDER — LIDOCAINE HYDROCHLORIDE 10 MG/ML
10 INJECTION, SOLUTION EPIDURAL; INFILTRATION; INTRACAUDAL; PERINEURAL ONCE
Status: COMPLETED | OUTPATIENT
Start: 2024-07-31 | End: 2024-07-31

## 2024-07-31 RX ADMIN — NITROGLYCERIN 30 MG: 20 OINTMENT TOPICAL at 18:37

## 2024-07-31 RX ADMIN — LIDOCAINE HYDROCHLORIDE 10 ML: 10 INJECTION, SOLUTION EPIDURAL; INFILTRATION; INTRACAUDAL; PERINEURAL at 15:38

## 2024-07-31 RX ADMIN — CARVEDILOL 12.5 MG: 12.5 TABLET, FILM COATED ORAL at 09:31

## 2024-07-31 RX ADMIN — HYDRALAZINE HYDROCHLORIDE 10 MG: 10 TABLET ORAL at 16:33

## 2024-07-31 RX ADMIN — CARVEDILOL 12.5 MG: 12.5 TABLET, FILM COATED ORAL at 18:37

## 2024-07-31 RX ADMIN — CLOPIDOGREL BISULFATE 75 MG: 75 TABLET ORAL at 09:31

## 2024-07-31 RX ADMIN — POLYETHYLENE GLYCOL 3350 17 G: 17 POWDER, FOR SOLUTION ORAL at 09:30

## 2024-07-31 RX ADMIN — ENOXAPARIN SODIUM 40 MG: 40 INJECTION SUBCUTANEOUS at 21:15

## 2024-07-31 RX ADMIN — FLUTICASONE FUROATE AND VILANTEROL TRIFENATATE 1 PUFF: 200; 25 POWDER RESPIRATORY (INHALATION) at 09:33

## 2024-07-31 RX ADMIN — HYDRALAZINE HYDROCHLORIDE 10 MG: 10 TABLET ORAL at 21:24

## 2024-07-31 RX ADMIN — HYDRALAZINE HYDROCHLORIDE 10 MG: 10 TABLET ORAL at 09:32

## 2024-07-31 RX ADMIN — ACETAMINOPHEN 650 MG: 325 TABLET, FILM COATED ORAL at 01:54

## 2024-07-31 RX ADMIN — Medication 1 TABLET: at 13:53

## 2024-07-31 RX ADMIN — GABAPENTIN 200 MG: 100 CAPSULE ORAL at 21:23

## 2024-07-31 RX ADMIN — KIT FOR THE PREPARATION OF TECHNETIUM TC 99M PENTETATE 52 MILLICURIE: 20 INJECTION, POWDER, LYOPHILIZED, FOR SOLUTION INTRAVENOUS; RESPIRATORY (INHALATION) at 08:43

## 2024-07-31 RX ADMIN — FUROSEMIDE 60 MG: 10 INJECTION, SOLUTION INTRAMUSCULAR; INTRAVENOUS at 21:15

## 2024-07-31 RX ADMIN — ENOXAPARIN SODIUM 50 MG: 60 INJECTION SUBCUTANEOUS at 09:35

## 2024-07-31 RX ADMIN — FUROSEMIDE 60 MG: 10 INJECTION, SOLUTION INTRAMUSCULAR; INTRAVENOUS at 09:31

## 2024-07-31 RX ADMIN — AMLODIPINE BESYLATE 10 MG: 10 TABLET ORAL at 09:32

## 2024-07-31 RX ADMIN — NICOTINE 1 PATCH: 21 PATCH, EXTENDED RELEASE TRANSDERMAL at 09:35

## 2024-07-31 RX ADMIN — CETIRIZINE HYDROCHLORIDE 5 MG: 5 TABLET ORAL at 09:33

## 2024-07-31 RX ADMIN — ROSUVASTATIN CALCIUM 10 MG: 10 TABLET, FILM COATED ORAL at 21:24

## 2024-07-31 RX ADMIN — KIT FOR THE PREPARATION OF TECHNETIUM TC 99M ALBUMIN AGGREGATED 5.6 MILLICURIE: 2.5 INJECTION, POWDER, FOR SOLUTION INTRAVENOUS at 08:45

## 2024-07-31 RX ADMIN — FAMOTIDINE 10 MG: 10 TABLET, FILM COATED ORAL at 09:31

## 2024-07-31 ASSESSMENT — ACTIVITIES OF DAILY LIVING (ADL)
ADLS_ACUITY_SCORE: 43
ADLS_ACUITY_SCORE: 38
ADLS_ACUITY_SCORE: 43
ADLS_ACUITY_SCORE: 46
ADLS_ACUITY_SCORE: 38
ADLS_ACUITY_SCORE: 38
ADLS_ACUITY_SCORE: 43
ADLS_ACUITY_SCORE: 38
ADLS_ACUITY_SCORE: 46
ADLS_ACUITY_SCORE: 38
ADLS_ACUITY_SCORE: 43
ADLS_ACUITY_SCORE: 38
ADLS_ACUITY_SCORE: 43

## 2024-07-31 NOTE — PLAN OF CARE
Goal Outcome Evaluation:       Summary:  Heart Failure, BMP 32,028 on admission, SOB, CKD, hypoxia, recent fall  DATE & TIME: 7/30/24 9805-1088    Cognitive Concerns/ Orientation : A&Ox4   BEHAVIOR & AGGRESSION TOOL COLOR: Green  CIWA SCORE: N/a   ABNL VS/O2: BP elevated 172/73, 2 liters oxygen   MOBILITY: Used blower to slide into bed, did not get up  PAIN MANAGMENT: Back pain  DIET: 2 gram NA  BOWEL/BLADDER: purewick in place  ABNL LAB/BG: BNP 37890, Trop 153, 149, 80, Hemoglobin 9.4, D- dimer 12.92- unable to do CT scan due to allergy- will do VQ scan tomorrow per MD- pt to start on Heparin gtt this evening per MD  DRAIN/DEVICES: Right jugular PIV  TELEMETRY RHYTHM: on telemetry  SKIN: Right forearm wound, coccyx wound, right stump- WOC consulted  TESTS/PROCEDURES: Chest xray, US negative for DVT  D/C DAY/GOALS/PLACE: pending improvement   OTHER IMPORTANT INFO: IV Lasix

## 2024-07-31 NOTE — CONSULTS
[facility]  WOC Nurse Inpatient Assessment     Consulted for:  Right AKA stump, right forearm, coccyx    Summary:    Right AKA: healing wound to anterior distal thigh, no s/s infection.   Bilateral forearms: skin tears from fall off commode at home.  Pt states these were dressed last night in the ED with the instruction to not remove for 24 hrs; pt therefore declined WOC assessment 7/31.  Nursing to follow skin tear protocol, no need for WOC to see this week if no complications.  Coccyx: history of full-thickness pressure injury, current intact/healed.     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  Evaluation in the ER was concerning for congestive heart failure     Areas Assessed:      Areas visualized during today's visit: Sacrum/coccyx, Lower extremities , and Upper extremities     Wound location: Right AKA site     Last photo: 7-31-24 anterior thigh      7-31-24 medial/inferior AKA site      Wound due to: Surgical Wound  Wound history/plan of care: prior AKA 4-1-24 r/t PAD with complicated post-op course and subsequent debridement of eschar 5-20-24.  Home care has been dressing wound every other day.   Wound base: red moist tissue, slightly granular     Palpation of the wound bed: normal      Drainage: small     Description of drainage: serosanguinous     Measurements (length x width x depth, in cm): 6.5 x 2.7 x 0.3cm      Tunneling: N/A     Undermining: N/A  Periwound skin: Intact and Dry/scaly; medial prior incision site has remaining small dry eschar approx 0.7 x 0.7cm        Color: pink      Temperature: normal   Odor: none  Pain: mild, tender  Pain interventions prior to dressing change: slow and gentle cares   Treatment goal: Heal , Drainage control, Infection control/prevention, and Protection  STATUS: initial assessment and improving from prior hospitalization  Supplies ordered: supplies stored on unit      Wound location: Bilateral forearms    Last photo: 7-31-24      Wound due to: Skin Tear  Wound history/plan of care: pt states she fell off her commode at home and her wheelchair sliced her arms.  Wounds bandaged in ED evening of 7/30, instructed not to remove for 24 hrs.  Dressings remain clean/dry/intact and were not taken down 7/31.       Skin location: Coccyx    Last photo: 7-31-24      Due to: Pressure Injury  History/plan of care: prior full-thickness PI as noted in previous admissions, wound has healed and reopened previously but is intact today 7/31  Skin: intact pink blanchable tissue and scar tissue  Pain: denies , none  Pain interventions prior to dressing change: no significant pain present   Treatment goal: Protection  STATUS: healed  Supplies ordered: supplies stored on unit       Treatment Plan:     Right AKA site wound(s): Every other day  Cleanse with Vashe-moist gauze.  Allow gauze to soak on wound for at least 2 minutes, then remove, wiping wound clean.    Swab periwound with skin prep, let dry.  Apply Aquacel Ag to wound bed, cut to fit.  Cover with Mepilex 4x4.  Distal limb small eschar can stay open to air.   Elevate/cushion residual limb with pillow.     Right forearm wounds: Follow skin tear protocol found on the Intranet.  Notify WOC if concerns.       Coccyx: apply preventive Sacral Mepilex, change every 3 days and prn, and follow PIP measures.     Pressure Injury Prevention (PIP) Plan:  -If patient is declining pressure injury prevention interventions: Explore reason why and address patient's concerns and Educate on pressure injury risk and prevention  intervention(s)  -Mattress: Follow bed algorithm, reassess daily and order specialty mattress, if indicated.  -HOB: Maintain at or below 30 degrees, unless contraindicated  -Repositioning in bed: Every 1-2 hours , Left/right positioning; avoid supine, and Raise foot of bed prior to raising head of bed, to reduce patient sliding down (shear)  -Heels: Keep elevated off mattress and Pillows under calves  -Protective Dressing: Sacral Mepilex for prevention (#[id number]),  especially for the agitated patient   -Chair positioning: Chair cushion (#[id number])  and Assist patient to reposition hourly   -Moisture Management: Perineal cleansing /protection: Follow Incontinence Protocol, Avoid brief in bed, Clean and dry skin folds with bathing , and Consider InterDry (#[id number]) between folds  -Under Devices: Inspect skin under all medical devices during skin inspection , Ensure tubes are stabilized without tension, and Ensure patient is not lying on medical devices or equipment when repositioned      Orders: Written    RECOMMEND PRIMARY TEAM ORDER: None, at this time  Education provided: plan of care  Discussed plan of care with: Patient and Nurse  WOC nurse follow-up plan: weekly  Notify WOC if wound(s) deteriorate.  Nursing to notify the Provider(s) and re-consult the WOC Nurse if new skin concern.    DATA:     Current support surface: Standard  Standard Isoflex gel  Containment of urine/stool: Incontinence Protocol  BMI: Body mass index is 22.2 kg/m .   Active diet order: Orders Placed This Encounter      Combination Diet 2 gm NA Diet     Output: I/O last 3 completed shifts:  In: 600 [P.O.:600]  Out: 1200 [Urine:1200]     Labs:   Recent Labs   Lab 07/30/24  1233   ALBUMIN 3.3*   HGB 9.4*   WBC 8.8     Pressure injury risk assessment:   Sensory Perception: 4-->no impairment  Moisture: 4-->rarely moist  Activity: 2-->chairfast  Mobility: 3-->slightly limited  Nutrition: 3-->adequate  Friction and Shear: 1-->problem  Cesar Score:  17    Catalina Garcia RN CWOCN  -Securely message with Vocera ([facility] [employer])   -[facility] Office Phone: [phone] (messages checked periodically Mon-Fri 8a-4p)

## 2024-07-31 NOTE — PROGRESS NOTES
[facility]    Hospitalist Progress Note    Assessment & Plan   Shirley Hendricks is a 63 year old female with a history of COPD, tobacco use, hypertension, hyperlipidemia, coronary artery disease, presents to the emergency department with shortness of breath, concerns for CHF exacerbation.  Acute hypoxic respiratory failure  Acute on chronic diastolic heart failure  Moderate to severe pleural effusion on left  Symptoms have been ongoing for few weeks, discharged home on Bumex 1 mg 3 times daily for 14 days recently, has been off of diuretics since that was completed.  --Continue IV diuresis, received Lasix 40 mg IV twice daily, creatinine is 1.25 today, will change it into 60 mg IV twice daily.  --Continue close monitoring of intake/output, daily weight, CT chest was reviewed as well as VQ scan, VQ scan does not indicate any high probability for PE, D-dimer was elevated and Dopplers did not indicate any DVT.  --CT scan shows moderate to severe pleural effusion with collapse of the left lower lobe of the lungs, will order therapeutic and diagnostic pleural effusion of the left side.  -Continue Lovenox for DVT prophylaxis, wean oxygen down as possible, PT and OT consulted.  Hypertension  --Blood pressures are quite elevated, continue PTA amlodipine, carvedilol and hydralazine.  Hyperlipidemia  --Continue PTA rosuvastatin  CAD  PVD  --Prior history of MI in 2019, history of 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 2024  --Continue Plavix, carvedilol and rosuvastatin, cardiology has been consulted this admission.  Chronic tobacco use disorder  --Currently smokes 5 cigarettes/day  -Continue 21 mg nicotine patch  CKD stage III  --Creatinine baseline is around 1.9-2.5, creatinine is currently trending up, present with a creatinine of 1.05  --Will continue diuresis and monitor creatinine, patient did receive some IV fluids overnight.  Extranodal marginal zone lymphoma of mucosa associated lymphoid tissue  -Continue  outpatient follow-up with [place] [facility].     Discoid lupus erythematosus  -Noted.  Continue outpatient follow-up as previously arranged.     Depression  Anxiety  -Does not appear to be on any medication for this as an outpatient.     GERD  -Continue PTA famotidine.     Anemia  Baseline hemoglobin has been around 8-9 over the last few months.  Hemoglobin stable/slightly improved at 9.4 on 7/30/2024.  Recheck CBC in AM.     History of thrombocytopenia  Platelets in the 110s to 140s earlier this month.  Platelets within normal range at 249 on 7/30/2024.  -Hemoglobin is 8.3, did receive IV fluids overnight, will repeat CBC in AM.  Diet :Combination Diet 2 gm NA Diet  Snacks/Supplements Adult: Expedite Bottle; Between Meals  DVT Prophylaxis: Enoxaparin (Lovenox) SQ  Code Status: Full Code     52 MINUTES SPENT BY ME on the date of service doing chart review, history, exam, documentation & further activities per the note.  Medically Ready for Discharge: Anticipated Tomorrow    Clinically Significant Risk Factors          # Hypocalcemia: Lowest Ca = 8.4 mg/dL in last 2 days, will monitor and replace as appropriate     # Hypoalbuminemia: Lowest albumin = 3.3 g/dL at 7/30/2024 12:33 PM, will monitor as appropriate     # Hypertension: Noted on problem list             # Severe Malnutrition: based on nutrition assessment, PRESENT ON ADMISSION   # Financial/Environmental Concerns:           Araceli Spicer MD  Text Page   (7am to 6pm)    Interval History   Changes on 8 L of oxygen, significant dyspnea noted with minimal activity.  Discussed about CT scan and VQ scan findings, plan discussed for ultrasound thoracentesis, she mentioned that she has concern about needles.    -Data reviewed today: I reviewed all new labs and imaging results over the last 24 hours.   Physical Exam     Vital Signs with Ranges  Temp:  [98.1  F (36.7  C)-99.8  F (37.7  C)] 98.5  F (36.9  C)  Pulse:  [61-81] 63  Resp:  [12-26] 20  BP:  (141-227)/() 144/60  SpO2:  [90 %-98 %] 92 %  I/O last 3 completed shifts:  In: 600 [P.O.:600]  Out: 1200 [Urine:1200]    Constitutional: Awake, alert, cooperative, no apparent distress  Respiratory: Breath sounds reduced on the left  Cardiovascular: Regular rate and rhythm, normal S1 and S2, and no murmur noted  GI: Normal bowel sounds, soft, non-distended, non-tender  Skin/Integumen:right aka with healing wound  Neuro : moving all 4 extremities, no focal deficit noted     Medications   Current Facility-Administered Medications   Medication Dose Route Frequency Provider Last Rate Last Admin    Continuing beta blocker from home medication list OR beta blocker order already placed during this visit   Does not apply DOES NOT GO TO [place] MD        Reason ACE/ARB/ARNI order not selected   Other DOES NOT GO TO [place] MD         Current Facility-Administered Medications   Medication Dose Route Frequency Provider Last Rate Last Admin    amLODIPine (NORVASC) tablet 10 mg  10 mg Oral Daily New Silva MD   10 mg at 07/31/24 0932    carvedilol (COREG) tablet 12.5 mg  12.5 mg Oral BID w/meals New Silva MD   12.5 mg at 07/31/24 0931    cetirizine (zyrTEC) tablet 5 mg  5 mg Oral Daily New Silva MD   5 mg at 07/31/24 0933    clopidogrel (PLAVIX) tablet 75 mg  75 mg Oral Daily New Silva MD   75 mg at 07/31/24 0931    enoxaparin ANTICOAGULANT (LOVENOX) injection 40 mg  0.75 mg/kg Subcutaneous Q12H New Silva MD        famotidine (PEPCID) tablet 10 mg  10 mg Oral Daily New Silva MD   10 mg at 07/31/24 0931    fluticasone-vilanterol (BREO ELLIPTA) 200-25 MCG/ACT inhaler 1 puff  1 puff Inhalation Daily New Silva MD   1 puff at 07/31/24 0933    furosemide (LASIX) injection 60 mg  60 mg Intravenous Q8H Araceli Spicer MD   60 mg at 07/31/24 0931    gabapentin (NEURONTIN) capsule 200 mg   200 mg Oral At Bedtime New Silva MD   200 mg at 07/30/24 2220    hydrALAZINE (APRESOLINE) tablet 10 mg  10 mg Oral TID New Silva MD   10 mg at 07/31/24 0932    multivitamin w/minerals (THERA-VIT-M) tablet 1 tablet  1 tablet Oral Daily Araceli Spicer MD        nicotine (NICODERM CQ) 21 MG/24HR 24 hr patch 1 patch  1 patch Transdermal Daily New Silva MD   1 patch at 07/31/24 0935    nitroGLYcerin (NITRO-BID) 2 % ointment 30 mg  2 inch Transdermal Q24H New Silva MD   30 mg at 07/30/24 1546    polyethylene glycol (MIRALAX) Packet 17 g  17 g Oral Daily New Silva MD   17 g at 07/31/24 0930    rosuvastatin (CRESTOR) tablet 10 mg  10 mg Oral At Bedtime New Silva MD   10 mg at 07/30/24 2220    sodium chloride (PF) 0.9% PF flush 3 mL  3 mL Intracatheter Q8H New Silva MD   3 mL at 07/31/24 0455       Data   Recent Labs   Lab 07/31/24  0932 07/30/24  1233   WBC 5.8 8.8   HGB 8.3* 9.4*    99    249   * 136   POTASSIUM 4.8 5.0   CHLORIDE 101 104   CO2 23 24   BUN 26.8* 30.2*   CR 1.26* 1.05*   ANIONGAP 8 8   SONIA 8.4* 8.8   GLC 78 88   ALBUMIN  --  3.3*   PROTTOTAL  --  6.6   BILITOTAL  --  0.3   ALKPHOS  --  57   ALT  --  17   AST  --  28     Recent Labs   Lab 07/31/24  0932 07/30/24  1233   GLC 78 88       Imaging:   Recent Results (from the past 24 hour(s))   CT Chest w/o Contrast    Narrative    CT CHEST WITHOUT CONTRAST 7/31/2024 9:07 AM    CLINICAL HISTORY: Pneumonia.    TECHNIQUE: CT chest without IV contrast. Multiplanar reformats were  obtained. Dose reduction techniques were used.    CONTRAST: None.    COMPARISON: CT chest 6/24/2024. Chest radiograph 7/30/2024    FINDINGS:   LUNGS AND PLEURA: Moderate to large left and small to moderate right  pleural effusions with adjacent atelectasis. There is complete  collapse of the left lower lobe. Interstitial pulmonary edema. No  pneumothorax. Trace  debris within the upper trachea.    MEDIASTINUM/AXILLAE: Similar thyroid nodules. No lymphadenopathy.  Aortic valve calcification. No thoracic aortic aneurysm.    CORONARY ARTERY CALCIFICATION: Severe.    UPPER ABDOMEN: Mild postcholecystectomy reservoir effect. Trace  perisplenic ascites.    MUSCULOSKELETAL: No aggressive osseous lesion. Degenerative changes of  the spine.      Impression    IMPRESSION:   1.  Moderate to large left and small to moderate right pleural  effusions with adjacent compressive atelectasis with complete collapse  of the left lower lobe.  2.  Interstitial pulmonary edema.    SILVIO COOK MD         SYSTEM ID:  [id number]   NM Lung Scan Ventilation and Perfusion    Narrative    EXAM: NM LUNG SCAN VENTILATION AND PERFUSION  LOCATION: [facility]  DATE: 7/31/2024    INDICATION: intermediate suspicion for PE, D dimer positive  COMPARISON: CT chest 07/31/2024.  TECHNIQUE: 52 mCi Tc-99m DTPA inhaled. 5.6 mCi Tc-99m MAA, IV. Standard planar imaging during perfusion and ventilation portions of exam.    FINDINGS: Matched perfusion defect in the left lower lung with ventilation defect larger than the perfusion defect, likely related to pleural effusion. No segmental perfusion defect of the right lung. Heterogeneous ventilation of the right lung. No   mismatched segmental perfusion defect.      Impression    IMPRESSION:    Low probability for pulmonary embolism.

## 2024-07-31 NOTE — PROGRESS NOTES
[facility] brief note:    While present on unit, heard continuous pulse oximetry continually alarming.  Entering pt's room to find pt resting in bed, O2 sats mid 80s% on 2L O2 via NC.  Pt easily opens eyes to voice.  Pt reports no SOB.  Requested for pt to cough and take some deep breaths through nose with no improvement in O2 sat. Increased NC to 3>5>6L O2 with no improvement in O2 saturations.  Subsequently placed pt on oxymask at 8L with O2 sats now 91-94%.  Pt notes she feels claustrophobic with oxymask in place; discussed with pt O2 sats did not tolerate NC at this time.  Pt holding oxymask in place with hand.  Pt expressed frustration about need for oxymask and alarms; provided therapeutic listening.  Updated nursing that pt now on 8L O2 via oxymask.  Can attempt to weak O2 as able.      NOELLE Gallardo, CNP  [facility]    No charge.

## 2024-07-31 NOTE — CONSULTS
[facility]    Cardiology Consultation     Date of Admission:  7/30/2024    Assessment & Plan   Shirley Hendricks is a 63 year old female who was admitted on 7/30/2024.    1.  Acute heart failure exacerbation-diastolic. NT-proBNP 34,000. Stopped bumex 10 days ago  2.  Recent admission for left mainstem bronchus obstruction causing respiratory failure  3.  Status post left thoracentesis 6/22 and chest tube-residual moderate left effusion or most recent chest x-ray  4.  Recent acute renal failure requiring hemodialysis and 05/2024  5.  Coronary disease- of Dignoal, moderate disease in RPAV, distal circumflex and 45% pRCA (-ve IFR)  6.  Chronic longstanding anemia  7.  Severe peripheral vascular disease s/p above-knee amputation and currently an ulcer at the amputation site  8.  Chronic obstructive pulmonary disease  9.  History of B-cell lymphoma  10.  Current tobacco use    Recommendation:   1.  Agree with aggressive diuresis.  Her weight on 7/8/2024 at the time of hospital discharge was 110 pounds.  Current weight is 117 pounds.  Chest x-ray and CT show pulmonary edema.  NT proBNP is 34,000.  All the above point towards her being volume overloaded.  2. Was on 3 mg po bumex PTA.   3. Continue plavix, coreg, hydralazine and crestor.   4.  Will add jardiance and Aldactone at a later date if possible.     High complexity     Hayden Mason MD, MD    Primary Care Physician   Vasquez Benoit    Reason for Consult   Reason for consult: I was asked to evaluate this patient for CHF.    History of Present Illness   Shirley Hendricks is a 63 year old female who presents with shortness of breath.  The patient is a 63-year-old lady with past medical history of diastolic heart failure, coronary disease, COPD, chronic smoker, peripheral vascular disease s/p BKA, who presents to the hospital for complaints of shortness of breath for the past few days.  The patient was recently admitted to the hospital  2 weeks ago for similar reasons and was discharged at a time in stable condition.  2 weeks prior to that she was also admitted to the hospital for mucous plugging and obstruction of left mainstem bronchus which led to respiratory failure.  She had a left large left pleural effusion  for which she underwent thoracentesis with removal of 800 cc and later 1200 cc.  She was also given bumex for 14 days which she ran out of on 7/21. She was doing well as long as she was taking bumex. Since then she has been slowly gaining weight.  I addition to that during the most recent admission there was still moderate amount of pleural fluid noted.  NT-proBNP during that admission was 7000 and on this admission has increased to 34,000.  Troponins were 125 at that time and are now at 70.    As far as cardiac workup is concerned she had an angiogram in 2023 which showed   of an Diagonal vessel with good collaterals and moderate disease elsewhere.  They also included hemodynamic testing of RCA lesion and it was negative.  No intervention was performed.      Cath results:    Prox RCA lesion is 45% stenosed.    RPAV-1 lesion is 30% stenosed.    RPAV-2 lesion is 50% stenosed.    1st Mrg lesion is 45% stenosed.    1st Diag lesion is 100% stenosed.    Dist Cx lesion is 55% stenosed.     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 clot    Post-menopausal     Reflux esophagitis 02/2004    EGD: esophagitis and medium HH    SBO (small bowel obstruction) (H) 08/10/2021    SVT (supraventricular tachycardia) (H24)     Thrombocytopenia (H24)     Uncomplicated asthma     Vitamin C deficiency 08/12/2018         Past Surgical History   Past Surgical History:   Procedure Laterality Date    AMPUTATE LEG ABOVE KNEE N/A 4/1/2024    Procedure: AMPUTATION, ABOVE KNEE right leg with wound vac dressing, excision of anteriotibial bypass graft, closure of (previous interventional radiology) left groin incision;  Surgeon: José Luis Hernandez MD;  Location:  OR    AMPUTATE LEG ABOVE KNEE Right 4/9/2024    Procedure: COMPLETION RIGHT ABOVE KNEE AMPUTATION;  Surgeon: José Luis Hernandez MD;  Location:  OR    ANGIOGRAM      ANGIOGRAM Right 6/23/2021    Procedure: RIGHT LOWER EXTREMITY ANGIOGRAM WITH LEFT BRACHIAL ARTERY CUTDOWN;  Surgeon: José Luis Hernandez MD;  Location:  OR    APPLY WOUND VAC Right 5/16/2024    Procedure: PLACEMENT OF WOUND VAC TO RIGHT ABOVE KNEE AMPUTATION;  Surgeon: Tay Enciso MD;  Location:  OR    APPLY WOUND VAC N/A 5/20/2024    Procedure: AND PLACEMENT OF WOUND VAC;  Surgeon: José Luis Hernandez MD;  Location:  OR    BIOPSY MASS NECK Right 10/11/2023    Procedure: Right Parotid Biopsy;  Surgeon: Randal Mendoza MD;  Location:  OR    BRONCHOSCOPY FLEXIBLE AND RIGID N/A 6/26/2024    Procedure: Bronchoscopy flexible and rigid;  Surgeon: Rod Nath MD;  Location:  GI    BYPASS GRAFT FEMOROPOPLITEAL Right 09/23/2020    Procedure: RIGHT FEMORAL GRAFT TO ABOVE-KNEE POPLITEAL BYPASS USING CADAVERIC SUPERFICIAL FEMORAL ARTERY;  Surgeon: Chadwick Lozano MD;  Location:  OR    BYPASS GRAFT FEMOROPOPLITEAL Right  2/15/2022    Procedure: RIGHT SUPERFICIAL FEMORAL ARTERY GRAFT TO BELOW KNEE POPLITEAL BYPASS WITH CADAVERIC CRYOLIFE  FEMORAL-POPLITEAL ARTERY SIZE: OUTER DIAMETER: 7MM - 6MM;  Surgeon: Chadwick Lozano;  Location:  OR    BYPASS GRAFT FEMOROPOPLITEAL Right 5/26/2023    Procedure: RIGHT DISTAL SUPERFICIAL FEMORAL GRAFT TO ANTERIOR TIBIAL ARTERY WITH 26 CENTIMETER CADAVERIC SUPERFICIAL FEMORAL ARTERY GRAFT;  Surgeon: Chadwick Lozano MD;  Location:  OR    BYPASS GRAFT FEMOROPOPLITEAL Right 10/11/2023    Procedure: RIGHT FEMORAL TO POPLITEAL GRAFT THROMBECTOMY;  Surgeon: Chadwick Lozano MD;  Location:  OR    BYPASS GRAFT INSITU FEMOROPOPLITEAL Right 7/7/2021    Procedure: CREATION RIGHT FEMORAL TO POPLITEAL ARTERIAL BYPASS USING INSITU VEIN GRAFT;  Surgeon: José Luis Hernandez MD;  Location:  OR    CARDIAC CATHERIZATION  09/03/2009    multivessel CAD without target lesions, med mgmt indicated, preserved ef    CARPAL TUNNEL RELEASE RT/LT Right 05/20/2021    COLONOSCOPY N/A 12/12/2023    Procedure: Colonoscopy;  Surgeon: Corey Hoffman MD;  Location:  GI    COLONOSCOPY N/A 12/14/2023    Procedure: Colonoscopy;  Surgeon: Corey Hoffman MD;  Location:  GI    CV CORONARY ANGIOGRAM N/A 10/4/2023    Procedure: Coronary Angiogram;  Surgeon: Rogelio Ricks MD;  Location:  HEART CARDIAC CATH LAB    CV PCI N/A 10/4/2023    Procedure: Percutaneous Coronary Intervention;  Surgeon: Rogelio Ricks MD;  Location:  HEART CARDIAC CATH LAB    EMBOLECTOMY LOWER EXTREMITY Right 10/6/2023    Procedure: Right anterior tibial bypass with graft, Right tibial endarterectomy,thrombectomy, Right doraslis pedis thrombectomy, Anterior Tibial vein patch.;  Surgeon: Chadwick Lozano MD;  Location:  OR    ENDARTERECTOMY CAROTID Right 05/11/2016    Procedure: ENDARTERECTOMY CAROTID;  Surgeon: Chadwick Lozano MD;  Location:  OR    ENDARTERECTOMY CAROTID Left  06/08/2020    Procedure: LEFT CAROTID ENDARTERECTOMY with distal facal vein patch  and EEG;  Surgeon: Chadwick Lozano MD;  Location:  OR    ESOPHAGOSCOPY, GASTROSCOPY, DUODENOSCOPY (EGD), COMBINED N/A 12/12/2023    Procedure: Esophagoscopy, gastroscopy, duodenoscopy (EGD), combined;  Surgeon: Corey Hoffman MD;  Location:  GI    FINE NEEDLE ASPIRATION WITHOUT IMAGING GUIDANCE Right 9/22/2023    Procedure: Fine needle aspiration without imaging guidance;  Surgeon: Kiersten Aguilera MD;  Location:  GI    FLUORESCENCE INTRAOPERATIVE VASCULAR ANGIOGRAPHY Right 12/28/2022    Procedure: RIGHT LEG ANGIOGRAM with intervention;  Surgeon: Chadwick Lozano MD;  Location:  OR    GYN SURGERY  left tube    left salpingectomy    IR ANGIOGRAM THROUGH CATHETER (ARTERIAL)  10/6/2023    IR ANGIOGRAM THROUGH CATHETER (ARTERIAL)  10/6/2023    IR ANGIOGRAM THROUGH CATHETER (ARTERIAL)  3/31/2024    IR ANGIOGRAM THROUGH CATHETER (ARTERIAL)  3/30/2024    IR CHEST TUBE PLACEMENT NON-TUNNELLED LEFT  6/23/2024    IR CVC TUNNEL PLACEMENT > 5 YRS OF AGE  4/3/2024    IR CVC TUNNEL PLACEMENT > 5 YRS OF AGE  6/23/2024    IR CVC TUNNEL REMOVAL RIGHT  5/28/2024    IR CVC TUNNEL REMOVAL RIGHT  7/3/2024    IR CVC TUNNEL REVISION RIGHT  4/15/2024    IR LOWER EXTREMITY ANGIOGRAM RIGHT  05/25/2021    IR LOWER EXTREMITY ANGIOGRAM RIGHT  10/5/2023    IR LOWER EXTREMITY ANGIOGRAM RIGHT  3/29/2024    IR OR ANGIOGRAM  6/23/2021    IR OR ANGIOGRAM  12/28/2022    IRRIGATION AND DEBRIDEMENT LOWER EXTREMITY, COMBINED Right 5/16/2024    Procedure: IRRIGATION AND DEBRIDEMENT OF RIGHT ABOVE KNEE AMPUTATION;  Surgeon: Tay Enciso MD;  Location:  OR    IRRIGATION AND DEBRIDEMENT LOWER EXTREMITY, COMBINED Right 5/20/2024    Procedure: IRRIGATION AND DEBRIDMENT RIGHT LOWER EXTREMITY;  Surgeon: José Luis Hernandez MD;  Location:  OR    LAPAROSCOPIC CHOLECYSTECTOMY N/A 09/27/2017    Procedure: LAPAROSCOPIC CHOLECYSTECTOMY;   LAPAROSCOPIC CHOLECYSTECTOMY;  Surgeon: Jacoby Tapia MD;  Location:  SD    LAPAROSCOPY DIAGNOSTIC (GENERAL) N/A 8/11/2021    Procedure: Exploratory laparoscopy,  laparoscopic lysis of adhesions, laparotomy;  Surgeon: Corina Ferreira MD;  Location:  OR    OR ANGIOGRAM, LOWER EXTREMITY Right 10/11/2023    Procedure: Or Angiogram, Lower Extremity;  Surgeon: Chadwick Lozano MD;  Location:  OR    ORTHOPEDIC SURGERY      left knee surgery    REPAIR ANEURYSM FEMORAL ARTERY Left 12/28/2022    Procedure: REPAIR LEFT FEMORAL PSEUDOANEURYSM;  Surgeon: Chadwick Lozano MD;  Location:  OR    VASCULAR SURGERY  aoto bi fem  left fem-AK pop    [facility] FABRIC WRAPPING OF ABDOMINAL ANEURYSM      [facility] NONSPECIFIC PROCEDURE  12/2000    angioplasty with stent right fem. a.    [facility] NONSPECIFIC PROCEDURE  1987    sinus surgery    [facility] NONSPECIFIC PROCEDURE  09/02/2009    Emergent left groin exploration with oversewing of bleeding angiographic site. 2. Endarterectomy of common femoral-proximal superficial femoral artery with greater saphenous vein patch angioplasty.   a. [place] of accessory right greater saphenous vein.     [facility] NONSPECIFIC PROCEDURE  08/27/2009    occluded left common iliac and external iliac arteries were successfully revascularized with stenting to 8 and 7 mm          Prior to Admission Medications   Prior to Admission Medications   Prescriptions Last Dose Informant Patient Reported? Taking?   acetaminophen (TYLENOL) 500 MG tablet  at prn  No Yes   Sig: Take 1-2 tablets (500-1,000 mg) by mouth every 6 hours as needed for mild pain   albuterol (PROAIR HFA/PROVENTIL HFA/VENTOLIN HFA) 108 (90 Base) MCG/ACT inhaler  at prn  No Yes   Sig: Inhale 2 puffs into the lungs every 4 hours as needed for shortness of breath, wheezing or cough   amLODIPine (NORVASC) 10 MG tablet   No Yes   Sig: Take 1 tablet (10 mg) by mouth daily   budesonide-formoterol (SYMBICORT) 160-4.5 MCG/ACT Inhaler   No Yes   Sig:  Inhale 2 puffs into the lungs two times daily Disp 3 inhalers   bumetanide (BUMEX) 1 MG tablet   No No   Sig: Take 1 tablet (1 mg) by mouth 3 times daily for 14 days   carvedilol (COREG) 12.5 MG tablet   No Yes   Sig: Take 1 tablet (12.5 mg) by mouth 2 times daily (with meals)   cetirizine (ZYRTEC) 5 MG tablet   No Yes   Sig: Take 1 tablet (5 mg) by mouth daily   clopidogrel (PLAVIX) 75 MG tablet   No Yes   Sig: Take 1 tablet (75 mg) by mouth daily   diphenoxylate-atropine (LOMOTIL) 2.5-0.025 MG tablet  at prn  No Yes   Sig: Take 1 tablet by mouth 4 times daily as needed for diarrhea   famotidine (PEPCID) 10 MG tablet   No Yes   Sig: Take 1 tablet (10 mg) by mouth daily for 30 days   gabapentin (NEURONTIN) 100 MG capsule   No Yes   Sig: Take 2 capsules (200 mg) by mouth at bedtime   hydrALAZINE (APRESOLINE) 10 MG tablet   No No   Sig: Take 1 tablet (10 mg) by mouth 3 times daily   miconazole (MICATIN) 2 % external powder   No No   Sig: Apply topically 2 times daily   nicotine (NICODERM CQ) 14 MG/24HR 24 hr patch   No Yes   Sig: Place 1 patch onto the skin daily   nicotine (NICODERM CQ) 14 MG/24HR 24 hr patch   No No   Sig: Place 1 patch onto the skin every 24 hours   nitroGLYcerin (NITROSTAT) 0.4 MG sublingual tablet  at prn Self No Yes   Sig: Place 1 tablet (0.4 mg) under the tongue See Admin Instructions for chest pain   ondansetron (ZOFRAN ODT) 4 MG ODT tab  at prn  No Yes   Sig: Take 1 tablet (4 mg) by mouth every 6 hours as needed for nausea or vomiting   polyethylene glycol (MIRALAX) 17 GM/Dose powder  at prn  No Yes   Sig: Take 17 g by mouth daily   Patient taking differently: Take 17 g by mouth daily as needed   rosuvastatin (CRESTOR) 10 MG tablet   No Yes   Sig: Take 1 tablet (10 mg) by mouth at bedtime   saccharomyces boulardii (FLORASTOR) 250 MG capsule   No No   Sig: Take 1 capsule (250 mg) by mouth 2 times daily   silver sulfADIAZINE (SILVADENE) 1 % external cream   No No   Sig: Apply topically daily    wound support modular (EXPEDITE) LIQD bottle   No No   Sig: Take 60 mLs by mouth daily      Facility-Administered Medications: None     Current Facility-Administered Medications   Medication Dose Route Frequency Provider Last Rate Last Admin    acetaminophen (TYLENOL) tablet 650 mg  650 mg Oral Q4H PRN New Silva MD   650 mg at 07/31/24 0154    Or    acetaminophen (TYLENOL) Suppository 650 mg  650 mg Rectal Q4H PRN New Silva MD        albuterol (PROVENTIL) neb solution 2.5 mg  2.5 mg Nebulization Q2H PRN New Silva MD        amLODIPine (NORVASC) tablet 10 mg  10 mg Oral Daily New Silva MD   10 mg at 07/31/24 0932    calcium carbonate (TUMS) chewable tablet 1,000 mg  1,000 mg Oral 4x Daily PRN New Silva MD        carvedilol (COREG) tablet 12.5 mg  12.5 mg Oral BID w/meals New Silva MD   12.5 mg at 07/31/24 0931    cetirizine (zyrTEC) tablet 5 mg  5 mg Oral Daily New Silva MD   5 mg at 07/31/24 0933    clopidogrel (PLAVIX) tablet 75 mg  75 mg Oral Daily New Silva MD   75 mg at 07/31/24 0931    Continuing beta blocker from home medication list OR beta blocker order already placed during this visit   Does not apply DOES NOT GO TO MAR New Silva MD        enoxaparin ANTICOAGULANT (LOVENOX) injection 40 mg  0.75 mg/kg Subcutaneous Q12H New Silva MD        famotidine (PEPCID) tablet 10 mg  10 mg Oral Daily New Silva MD   10 mg at 07/31/24 0931    fluticasone-vilanterol (BREO ELLIPTA) 200-25 MCG/ACT inhaler 1 puff  1 puff Inhalation Daily New Silva MD   1 puff at 07/31/24 0933    furosemide (LASIX) injection 60 mg  60 mg Intravenous Q8H Araceli Spicer MD   60 mg at 07/31/24 0931    gabapentin (NEURONTIN) capsule 200 mg  200 mg Oral At Bedtime New Silva MD   200 mg at 07/30/24 2220    hydrALAZINE (APRESOLINE) tablet 10 mg  10 mg  Oral TID New Silva MD   10 mg at 07/31/24 0932    lidocaine (LMX4) cream   Topical Q1H PRN New Silva MD        lidocaine 1 % 0.1-1 mL  0.1-1 mL Other Q1H PRN New Silva MD        multivitamin w/minerals (THERA-VIT-M) tablet 1 tablet  1 tablet Oral Daily Araceli Spicre MD        nicotine (NICODERM CQ) 21 MG/24HR 24 hr patch 1 patch  1 patch Transdermal Daily New Silva MD   1 patch at 07/31/24 0935    nitroGLYcerin (NITRO-BID) 2 % ointment 30 mg  2 inch Transdermal Q24H New Silva MD   30 mg at 07/30/24 1546    ondansetron (ZOFRAN ODT) ODT tab 4 mg  4 mg Oral Q6H PRN New Silva MD        Or    ondansetron (ZOFRAN) injection 4 mg  4 mg Intravenous Q6H PRN New Silva MD        polyethylene glycol (MIRALAX) Packet 17 g  17 g Oral Daily New Silva MD   17 g at 07/31/24 0930    prochlorperazine (COMPAZINE) injection 5 mg  5 mg Intravenous Q6H PRN New Silva MD        Or    prochlorperazine (COMPAZINE) tablet 5 mg  5 mg Oral Q6H PRN New Silva MD        Or    prochlorperazine (COMPAZINE) suppository 12.5 mg  12.5 mg Rectal Q12H PRN New Silva MD        Reason ACE/ARB/ARNI order not selected   Other DOES NOT GO TO New Cao MD        rosuvastatin (CRESTOR) tablet 10 mg  10 mg Oral At Bedtime New Silva MD   10 mg at 07/30/24 2220    senna-docusate (SENOKOT-S/PERICOLACE) 8.6-50 MG per tablet 1 tablet  1 tablet Oral BID PRN New Silva MD        Or    senna-docusate (SENOKOT-S/PERICOLACE) 8.6-50 MG per tablet 2 tablet  2 tablet Oral BID PRN New Silva MD        sodium chloride (PF) 0.9% PF flush 3 mL  3 mL Intracatheter Q8H New Silva MD   3 mL at 07/31/24 0455    sodium chloride (PF) 0.9% PF flush 3 mL  3 mL Intracatheter q1 min prn New Silva MD         Current Facility-Administered  Medications   Medication Dose Route Frequency Provider Last Rate Last Admin    acetaminophen (TYLENOL) tablet 650 mg  650 mg Oral Q4H PRN New Silva MD   650 mg at 07/31/24 0154    Or    acetaminophen (TYLENOL) Suppository 650 mg  650 mg Rectal Q4H PRN New Silva MD        albuterol (PROVENTIL) neb solution 2.5 mg  2.5 mg Nebulization Q2H PRN New Silva MD        amLODIPine (NORVASC) tablet 10 mg  10 mg Oral Daily New Silva MD   10 mg at 07/31/24 0932    calcium carbonate (TUMS) chewable tablet 1,000 mg  1,000 mg Oral 4x Daily PRN New Silva MD        carvedilol (COREG) tablet 12.5 mg  12.5 mg Oral BID w/meals New Silva MD   12.5 mg at 07/31/24 0931    cetirizine (zyrTEC) tablet 5 mg  5 mg Oral Daily New Silva MD   5 mg at 07/31/24 0933    clopidogrel (PLAVIX) tablet 75 mg  75 mg Oral Daily New Silva MD   75 mg at 07/31/24 0931    Continuing beta blocker from home medication list OR beta blocker order already placed during this visit   Does not apply DOES NOT GO TO MAR New Silva MD        enoxaparin ANTICOAGULANT (LOVENOX) injection 40 mg  0.75 mg/kg Subcutaneous Q12H New Silva MD        famotidine (PEPCID) tablet 10 mg  10 mg Oral Daily New Silva MD   10 mg at 07/31/24 0931    fluticasone-vilanterol (BREO ELLIPTA) 200-25 MCG/ACT inhaler 1 puff  1 puff Inhalation Daily New Silva MD   1 puff at 07/31/24 0933    furosemide (LASIX) injection 60 mg  60 mg Intravenous Q8H Araceli Spiecr MD   60 mg at 07/31/24 0931    gabapentin (NEURONTIN) capsule 200 mg  200 mg Oral At Bedtime New Silva MD   200 mg at 07/30/24 2220    hydrALAZINE (APRESOLINE) tablet 10 mg  10 mg Oral TID New Silva MD   10 mg at 07/31/24 0932    lidocaine (LMX4) cream   Topical Q1H PRN New Silva MD        lidocaine 1 % 0.1-1 mL  0.1-1  mL Other Q1H PRN New Silva MD        multivitamin w/minerals (THERA-VIT-M) tablet 1 tablet  1 tablet Oral Daily Araceli Spicer MD        nicotine (NICODERM CQ) 21 MG/24HR 24 hr patch 1 patch  1 patch Transdermal Daily New Silva MD   1 patch at 07/31/24 0935    nitroGLYcerin (NITRO-BID) 2 % ointment 30 mg  2 inch Transdermal Q24H New Silva MD   30 mg at 07/30/24 1546    ondansetron (ZOFRAN ODT) ODT tab 4 mg  4 mg Oral Q6H PRN New Silva MD        Or    ondansetron (ZOFRAN) injection 4 mg  4 mg Intravenous Q6H PRN New Silva MD        polyethylene glycol (MIRALAX) Packet 17 g  17 g Oral Daily New Silva MD   17 g at 07/31/24 0930    prochlorperazine (COMPAZINE) injection 5 mg  5 mg Intravenous Q6H PRN New Silva MD        Or    prochlorperazine (COMPAZINE) tablet 5 mg  5 mg Oral Q6H PRN New Silva MD        Or    prochlorperazine (COMPAZINE) suppository 12.5 mg  12.5 mg Rectal Q12H PRN New Silva MD        Reason ACE/ARB/ARNI order not selected   Other DOES NOT GO TO MAR New Silva MD        rosuvastatin (CRESTOR) tablet 10 mg  10 mg Oral At Bedtime New Silva MD   10 mg at 07/30/24 2220    senna-docusate (SENOKOT-S/PERICOLACE) 8.6-50 MG per tablet 1 tablet  1 tablet Oral BID PRN New Silva MD        Or    senna-docusate (SENOKOT-S/PERICOLACE) 8.6-50 MG per tablet 2 tablet  2 tablet Oral BID PRN New Silva MD        sodium chloride (PF) 0.9% PF flush 3 mL  3 mL Intracatheter Q8H New Silva MD   3 mL at 07/31/24 0455    sodium chloride (PF) 0.9% PF flush 3 mL  3 mL Intracatheter q1 min prn [place], New Osman MD         Allergies   Allergies   Allergen Reactions    Contrast Dye Anaphylaxis     Pt reported facial and throat swelling with prior CT contrast    Pantoprazole      Protonix caused diffuse edema    Chantix  [Varenicline]      Terrible dreams    Gluten Meal GI Disturbance     Pt has celiac disease    Penicillins Itching and Rash       Social History    reports that she has quit smoking. Her smoking use included cigarettes. She started smoking about 56 years ago. She has a 14.1 pack-year smoking history. She has never used smokeless tobacco. She reports that she does not currently use alcohol. She reports current drug use. Drug: Marijuana.      Family History   I have reviewed this patient's family history and updated it with pertinent information if needed.  Family History   Problem Relation Age of Onset    Cancer Mother         bladder    Cardiovascular Father         alive,multiple heart attacks    Diabetes Maternal Grandmother     Lung Cancer Maternal Grandmother     Blood Disease Brother         clotting disorder          Review of Systems   A comprehensive review of system was performed and is negative other than that noted in the HPI or here.     Physical Exam   Vital Signs with Ranges  Temp:  [98.1  F (36.7  C)-99.8  F (37.7  C)] 98.5  F (36.9  C)  Pulse:  [61-81] 63  Resp:  [12-26] 20  BP: (141-227)/() 144/60  SpO2:  [90 %-98 %] 98 %  Wt Readings from Last 4 Encounters:   07/31/24 53.3 kg (117 lb 8.1 oz)   07/08/24 50 kg (110 lb 3.7 oz)   06/05/24 60.3 kg (132 lb 15 oz)   05/29/24 62.3 kg (137 lb 5.6 oz)     I/O last 3 completed shifts:  In: 600 [P.O.:600]  Out: 1200 [Urine:1200]      Vitals: BP (!) 144/60 (BP Location: Left arm)   Pulse 63   Temp 98.5  F (36.9  C) (Axillary)   Resp 20   Wt 53.3 kg (117 lb 8.1 oz)   LMP  (LMP Unknown)   SpO2 98%   BMI 22.20 kg/m      Physical Exam:   General - Alert and oriented to time place and person in no acute distress  Eyes - No scleral icterus  HEENT - Neck supple, moist mucous membranes  Cardiovascular - s1 s2 normal, systolic murmur  Extremities - There is 2+ b/l edema  Respiratory - decreased breath sounds b/l  Skin - No pallor or  "cyanosis  Gastrointestinal - Non tender and non distended without rebound or guarding  Psych - Appropriate affect   Neurological - No gross motor neurological focal deficits    No lab results found in last 7 days.    Invalid input(s): \"TROPONINIES\"    Recent Labs   Lab 07/31/24  0932 07/30/24  1233   WBC 5.8 8.8   HGB 8.3* 9.4*    99    249   * 136   POTASSIUM 4.8 5.0   CHLORIDE 101 104   CO2 23 24   BUN 26.8* 30.2*   CR 1.26* 1.05*   GFRESTIMATED 47* 59*   ANIONGAP 8 8   SONIA 8.4* 8.8   GLC 78 88   ALBUMIN  --  3.3*   PROTTOTAL  --  6.6   BILITOTAL  --  0.3   ALKPHOS  --  57   ALT  --  17   AST  --  28     Recent Labs   Lab Test 10/03/23  0941 02/15/22  0632   CHOL 137 124   HDL 54 48*   LDL 69 61   TRIG 68 76     Recent Labs   Lab 07/31/24  0932 07/30/24  1233   WBC 5.8 8.8   HGB 8.3* 9.4*   HCT 26.4* 28.9*    99    249     No results for input(s): \"PH\", \"PHV\", \"PO2\", \"PO2V\", \"SAT\", \"PCO2\", \"PCO2V\", \"HCO3\", \"HCO3V\" in the last 168 hours.  Recent Labs   Lab 07/30/24  1233   NTBNPI 32,028*     Recent Labs   Lab 07/30/24  1233   DD 12.92*     No results for input(s): \"SED\", \"CRP\" in the last 168 hours.  Recent Labs   Lab 07/31/24  0932 07/30/24  1233    249     No results for input(s): \"TSH\" in the last 168 hours.  No results for input(s): \"COLOR\", \"APPEARANCE\", \"URINEGLC\", \"URINEBILI\", \"URINEKETONE\", \"SG\", \"UBLD\", \"URINEPH\", \"PROTEIN\", \"UROBILINOGEN\", \"NITRITE\", \"LEUKEST\", \"RBCU\", \"WBCU\" in the last 168 hours.    Imaging:  Recent Results (from the past 48 hour(s))   Chest XR,  PA & LAT    Narrative    CHEST TWO VIEWS 7/30/2024 12:47 PM     HISTORY: dyspnea, hypoxia hx COPD    COMPARISON: 7/21/2024       Impression    IMPRESSION: Moderate left and small right pleural effusions are  present, slightly worsened since the prior exam. Stable, enlarged  cardiomediastinal silhouette. Vascular calcifications are seen within  the thoracic aorta. Patchy airspace and interstitial " opacities are  seen within both lungs, slightly worsened since the prior exam  suggestive of any right lower lung. Differential diagnosis includes  pulmonary edema versus multifocal pneumonia. Multilevel degenerative  changes are seen in the spine. Partially seen surgical clips within  the left upper arm.    OLIVIA BILLY MD         SYSTEM ID:  [id number]   US Lower Extremity Venous Duplex Bilateral    Narrative    VENOUS ULTRASOUND BILATERAL LEG(S) July 30, 2024 1:27 PM     HISTORY: Rule out deep vein thrombosis. Right lower extremity pain.  Recent fall on blood thinners. History of right above-knee amputation.    COMPARISON: None.    FINDINGS: Examination of the deep veins with graded compression and  color flow Doppler with spectral wave form analysis was performed.     Right: Images show the right common femoral, profunda femoral and  proximal femoral veins are patent without deep vein thrombosis.    Left: Images show no evidence of thrombus in the bilateral common  femoral vein, femoral vein, popliteal vein or calf veins.      Impression    IMPRESSION: No deep vein thrombosis in either lower extremity.      GHASSAN COLLAZO DO         SYSTEM ID:  [id number]   CT Chest w/o Contrast    Narrative    CT CHEST WITHOUT CONTRAST 7/31/2024 9:07 AM    CLINICAL HISTORY: Pneumonia.    TECHNIQUE: CT chest without IV contrast. Multiplanar reformats were  obtained. Dose reduction techniques were used.    CONTRAST: None.    COMPARISON: CT chest 6/24/2024. Chest radiograph 7/30/2024    FINDINGS:   LUNGS AND PLEURA: Moderate to large left and small to moderate right  pleural effusions with adjacent atelectasis. There is complete  collapse of the left lower lobe. Interstitial pulmonary edema. No  pneumothorax. Trace debris within the upper trachea.    MEDIASTINUM/AXILLAE: Similar thyroid nodules. No lymphadenopathy.  Aortic valve calcification. No thoracic aortic aneurysm.    CORONARY ARTERY CALCIFICATION: Severe.    UPPER  ABDOMEN: Mild postcholecystectomy reservoir effect. Trace  perisplenic ascites.    MUSCULOSKELETAL: No aggressive osseous lesion. Degenerative changes of  the spine.      Impression    IMPRESSION:   1.  Moderate to large left and small to moderate right pleural  effusions with adjacent compressive atelectasis with complete collapse  of the left lower lobe.  2.  Interstitial pulmonary edema.    SILVIO COOK MD         SYSTEM ID:  [id number]   NM Lung Scan Ventilation and Perfusion    Narrative    EXAM: NM LUNG SCAN VENTILATION AND PERFUSION  LOCATION: [facility]  DATE: 7/31/2024    INDICATION: intermediate suspicion for PE, D dimer positive  COMPARISON: CT chest 07/31/2024.  TECHNIQUE: 52 mCi Tc-99m DTPA inhaled. 5.6 mCi Tc-99m MAA, IV. Standard planar imaging during perfusion and ventilation portions of exam.    FINDINGS: Matched perfusion defect in the left lower lung with ventilation defect larger than the perfusion defect, likely related to pleural effusion. No segmental perfusion defect of the right lung. Heterogeneous ventilation of the right lung. No   mismatched segmental perfusion defect.      Impression    IMPRESSION:    Low probability for pulmonary embolism.       Echo:  No results found for this or any previous visit (from the past 4320 hour(s)).    Clinically Significant Risk Factors Present on Admission          # Hypocalcemia: Lowest Ca = 8.4 mg/dL in last 2 days, will monitor and replace as appropriate     # Hypoalbuminemia: Lowest albumin = 3.3 g/dL at 7/30/2024 12:33 PM, will monitor as appropriate   # Drug Induced Platelet Defect: home medication list includes an antiplatelet medication   # Hypertension: Noted on problem list    # Anemia: based on hgb <11        # Severe Malnutrition: based on nutrition assessment     # Financial/Environmental Concerns:        Cardiomyopathy    Fluid overload, unspecified      Acute kidney failure, unspecified

## 2024-07-31 NOTE — PROGRESS NOTES
"CLINICAL NUTRITION SERVICES  -  ASSESSMENT NOTE    Recommendations Ordered by Registered Dietitian (RD):   Ordered Expedite bottle w/ ice at 10am  Ordered MVI/M   Malnutrition:   % Weight Loss:  GERALD w/ fluid status  % Intake:  No decreased intake noted per pt  Subcutaneous Fat Loss:  Orbital region mild depletion and Upper arm region moderate-severe depletion  Muscle Loss:  Temporal region moderate-severe depletion and Clavicle bone region mild-moderate depletion, upper arm severe depletion  Fluid Retention:  2-3+ mild-moderate    Malnutrition Diagnosis: Severe malnutrition  In Context of:  Acute illness or injury  Chronic illness or disease     REASON FOR ASSESSMENT  Shirley Hendricks is a 63 year old female seen by Registered Dietitian for Admission Nutrition Risk Screen for answering \"unsure\" to recently losing weight without trying.    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 Presents with SOB, acute on chronic diastolic heart failure.    NUTRITION HISTORY    - Spoke with patient at bedside. Per patient, her appetite has been good, normal. She hasn't noticed any wt loss but also doesn't check it regularly and doesn't trust the bed scales. She said she normally has 2 meals per day and snacks throughout the day. She said she follows a gluten free diet however while admitted she wants to continue her current diet and just self select the foods she can eat. She said the only thing she usually changes is instead of normal bread she has gluten free bread, also stays away from the hospital potatoes. She said in the past she has had Ensure/Glucerna and Rigo and she vomited after each. She has only been able to tolerate Expedite however wants it on ice.    CURRENT NUTRITION ORDERS  Diet Order: 2g NA Diet    Current Intake/Tolerance:  - Was eating breakfast at time of visit, good appetite and " "tolerating well, consumed ~75%    NUTRITION FOCUSED PHYSICAL ASSESSMENT FOR DIAGNOSING MALNUTRITION)  Yes         Observed:    Muscle wasting (refer to documentation in Malnutrition section) and Subcutaneous fat loss (refer to documentation in Malnutrition section)    Obtained from Chart/Interdisciplinary Team:  WOC following (7/31) ~  Wound location: Right AKA site   Wound due to: Surgical Wound   STATUS: initial assessment and improving from prior hospitalization     Wound location: Bilateral forearms   Wound due to: Skin Tear     Skin location: Coccyx   Due to: Pressure Injury   STATUS: healed     ANTHROPOMETRICS  Height: 5'1\"  Weight: 53.3 kg, 117 lbs 8.08 oz  Body mass index is 22.2 kg/m .  Weight Status:  Normal BMI  IBW: 47.7 kg  % IBW: 112%  Weight History: wt fluctuations likely d/t fluid status shifts, difficult to assess dry wt loss.  Wt Readings from Last 15 Encounters:   07/31/24 53.3 kg (117 lb 8.1 oz)   07/08/24 50 kg (110 lb 3.7 oz)   06/05/24 60.3 kg (132 lb 15 oz)   05/29/24 62.3 kg (137 lb 5.6 oz)   05/14/24 57 kg (125 lb 10.6 oz)   04/22/24 50.5 kg (111 lb 5.3 oz)   01/08/24 49.8 kg (109 lb 12.8 oz)   12/12/23 50.8 kg (112 lb)   11/21/23 50.6 kg (111 lb 8 oz)   11/13/23 51.6 kg (113 lb 11.2 oz)   10/11/23 52.5 kg (115 lb 11.2 oz)   08/02/23 52.3 kg (115 lb 6.4 oz)   05/26/23 52.1 kg (114 lb 12.8 oz)   12/28/22 51.1 kg (112 lb 11.2 oz)   12/27/22 51.7 kg (114 lb)     LABS  Na 132 (L), BUN 26.8 (H), Cr 1.26 (H)    MEDICATIONS  IV lasix, miralax    ASSESSED NUTRITION NEEDS PER APPROVED PRACTICE GUIDELINES:  Dosing Weight 53.3 kg  Estimated Energy Needs: 6650-0117 kcals (25-30 Kcal/Kg)  Justification: maintenance  Estimated Protein Needs: 64-80 grams protein (1.2-1.5 g pro/Kg)  Justification: wound healing  Estimated Fluid Needs: 4009-4352 mL (1 mL/Kcal)  Justification: maintenance or per MD    NUTRITION DIAGNOSIS:  Increased nutrient needs (protein) related to wound healing as evidenced by surgical " wound, previous reopened pressure injury (intact today), need for up to 80g protein per day for healing    NUTRITION INTERVENTIONS  Recommendations / Nutrition Prescription  See above    Implementation  Nutrition education: discussed high protein needs for wound healing  Medical Food Supplement - ordered  Multivitamin/Mineral - ordered    Nutrition Goals  Patient to consume >75% of TID meals + 1 bottle Expedite    MONITORING AND EVALUATION:  Progress towards goals will be monitored and evaluated per protocol and Practice Guidelines    Vangie Dukes RD, LD  Clinical Dietitian - [facility]

## 2024-07-31 NOTE — PROGRESS NOTES
MD Notification    Notified Person: MD    Notified Person Name: Chance    Notification Date/Time: 1045    Notification Interaction: Can I have a muscle relaxer or oxycodone with Atarax?? Pt has 8/10 back pain. Got 2 doses of morphine in ED earlier but nothing since besides Tylenol    Purpose of Notification:    Orders Received:    Comments:

## 2024-07-31 NOTE — PLAN OF CARE
Summary: Heart Failure, BMP 32,028 on admission, SOB, CKD, hypoxia, recent fall  DATE & TIME: 7/30/24 0091-1816   Cognitive Concerns/ Orientation : A&Ox4   BEHAVIOR & AGGRESSION TOOL COLOR: Green  CIWA SCORE: N/a   ABNL VS/O2: BP elevated 171/78 on 2L  MOBILITY: A2 pivot. W/C bound at baseline  PAIN MANAGMENT: C/O back pain. MD pagegia  DIET: 2 gram NA  BOWEL/BLADDER: Purewick in place  ABNL LAB/BG: BNP 32,028, Trop 153, 149, 80, Hemoglobin 9.4  DRAIN/DEVICES: Right jugular PIV  TELEMETRY RHYTHM:   SKIN: SINAN forearm wound, coccyx wound, right stump- WOC consulted  TESTS/PROCEDURES: Chest xray, US negative for DVT  D/C DAY/GOALS/PLACE: pending improvement   OTHER IMPORTANT INFO: IV Lasix, started Lovenox, NM lung scan

## 2024-07-31 NOTE — PHARMACY-ADMISSION MEDICATION HISTORY
Pharmacist Admission Medication History    Admission medication history is complete. The information provided in this note is only as accurate as the sources available at the time of the update.    Information Source(s): Patient via in-person. 7/8 Discharge Summary, Surescripts and patient brought in med list from [facility].    Pertinent Information: Patient last reports taking medications yesterday at home.      Completed 14 days of calcium acetate and bumetanide 7/22.    Completed 5 days of prednisone 7/26.    Has recent refills for carvedilol, gabapentin and amlodipine, but the patient's sister reported that she had been out of these for a few days per pharmacist Jennifer LOZOYA    Patient reports that she is unclear if she is taking hydralazine or what dose she is taking.  States that she isn't sure if she is taking Florastor.    Changes made to [facility] medication list:  Added: None  Deleted: Prednisone  Changed: None    Allergies reviewed with patient and updates made in EHR: no    Medication History Completed By: Lyle Mccabe [facility] 7/31/2024 11:15 AM    [facility] Med List   Medication Sig Last Dose    acetaminophen (TYLENOL) 500 MG tablet Take 1-2 tablets (500-1,000 mg) by mouth every 6 hours as needed for mild pain  at prn    albuterol (PROAIR HFA/PROVENTIL HFA/VENTOLIN HFA) 108 (90 Base) MCG/ACT inhaler Inhale 2 puffs into the lungs every 4 hours as needed for shortness of breath, wheezing or cough  at prn    amLODIPine (NORVASC) 10 MG tablet Take 1 tablet (10 mg) by mouth daily     budesonide-formoterol (SYMBICORT) 160-4.5 MCG/ACT Inhaler Inhale 2 puffs into the lungs two times daily Disp 3 inhalers     carvedilol (COREG) 12.5 MG tablet Take 1 tablet (12.5 mg) by mouth 2 times daily (with meals)     cetirizine (ZYRTEC) 5 MG tablet Take 1 tablet (5 mg) by mouth daily     clopidogrel (PLAVIX) 75 MG tablet Take 1 tablet (75 mg) by mouth daily     diphenoxylate-atropine (LOMOTIL) 2.5-0.025 MG tablet Take 1 tablet by mouth  4 times daily as needed for diarrhea  at prn    famotidine (PEPCID) 10 MG tablet Take 1 tablet (10 mg) by mouth daily for 30 days     gabapentin (NEURONTIN) 100 MG capsule Take 2 capsules (200 mg) by mouth at bedtime     nicotine (NICODERM CQ) 14 MG/24HR 24 hr patch Place 1 patch onto the skin daily     nitroGLYcerin (NITROSTAT) 0.4 MG sublingual tablet Place 1 tablet (0.4 mg) under the tongue See Admin Instructions for chest pain  at prn    ondansetron (ZOFRAN ODT) 4 MG ODT tab Take 1 tablet (4 mg) by mouth every 6 hours as needed for nausea or vomiting  at prn    polyethylene glycol (MIRALAX) 17 GM/Dose powder Take 17 g by mouth daily (Patient taking differently: Take 17 g by mouth daily as needed)  at prn    rosuvastatin (CRESTOR) 10 MG tablet Take 1 tablet (10 mg) by mouth at bedtime

## 2024-07-31 NOTE — PLAN OF CARE
Goal Outcome Evaluation:    Summary: Heart Failure, SOB, CKD, Hypoxia, Fall  DATE & TIME: 7/30/24 11p-730a   Cognitive Concerns/ Orientation : A&Ox4   BEHAVIOR & AGGRESSION TOOL COLOR: Green  CIWA SCORE: NA   ABNL VS/O2: BP elevated 175/86 mmHg, on O2 8l pm oxymask  MOBILITY: A2 pivot. W/C bound at baseline  PAIN MANAGMENT: Oxycodone and Tylenol given  DIET: 2 gram NA  BOWEL/BLADDER: Purewick in place  ABNL LAB/BG: AM lab  DRAIN/DEVICES: Right jugular PIV  TELEMETRY RHYTHM: SR with PVC's  SKIN: bilateral forearm skin tear, coccyx wound, right stump- WOC consulted  TESTS/PROCEDURES: Chest xray, US negative for DVT  D/C DAY/GOALS/PLACE: pending improvement   OTHER IMPORTANT INFO: for NM lung scan

## 2024-07-31 NOTE — PLAN OF CARE
Goal Outcome Evaluation:       Summary: Heart Failure, BMP 32,028 on admission, SOB, CKD, hypoxia, recent fall  DATE & TIME: 7/31/24 1830-5937  Cognitive Concerns/ Orientation : A&Ox4   BEHAVIOR & AGGRESSION TOOL COLOR: Green  CIWA SCORE: N/a   ABNL VS/O2: BP elevated at times, on scheduled BP medications, Oximyzer 9-10 liters.  Encouraged use of IS  MOBILITY: Used blower to slide into bed, did not get up, up with 2 assist lift  PAIN MANAGMENT: C/O back pain-repositioning   DIET: 2 gram NA  BOWEL/BLADDER: Purewick in place, no BM  ABNL LAB/BG: Na 132, CR 1.26, HGB 8.3  DRAIN/DEVICES: Right jugular PIV ( Iv lasix BID)  TELEMETRY RHYTHM: SR with PAC'S   SKIN: SINAN forearm wound- dressings intact, pt did not want WOC to take off and nursing will change dressings tomorrow, coccyx wound healed- Mepilex applied, right stump wound care completed by WOC consulted  TESTS/PROCEDURES: US Thoracentesis- removed 1.4 liters (site on left back CDI), NM results showed low probability for PE  D/C DAY/GOALS/PLACE: pending improvement   OTHER IMPORTANT INFO: IV Lasix

## 2024-08-01 ENCOUNTER — APPOINTMENT (OUTPATIENT)
Dept: OCCUPATIONAL THERAPY | Facility: CLINIC | Age: 64
DRG: 280 | End: 2024-08-01
Attending: HOSPITALIST
Payer: COMMERCIAL

## 2024-08-01 LAB
ANION GAP SERPL CALCULATED.3IONS-SCNC: 6 MMOL/L (ref 7–15)
ATRIAL RATE - MUSE: 65 BPM
BUN SERPL-MCNC: 27.4 MG/DL (ref 8–23)
CALCIUM SERPL-MCNC: 8.1 MG/DL (ref 8.8–10.4)
CHLORIDE SERPL-SCNC: 99 MMOL/L (ref 98–107)
CREAT SERPL-MCNC: 1.25 MG/DL (ref 0.51–0.95)
DIASTOLIC BLOOD PRESSURE - MUSE: NORMAL MMHG
EGFRCR SERPLBLD CKD-EPI 2021: 48 ML/MIN/1.73M2
ERYTHROCYTE [DISTWIDTH] IN BLOOD BY AUTOMATED COUNT: 15 % (ref 10–15)
GLUCOSE SERPL-MCNC: 146 MG/DL (ref 70–99)
HCO3 SERPL-SCNC: 28 MMOL/L (ref 22–29)
HCT VFR BLD AUTO: 24 % (ref 35–47)
HGB BLD-MCNC: 7.4 G/DL (ref 11.7–15.7)
INTERPRETATION ECG - MUSE: NORMAL
MCH RBC QN AUTO: 30.3 PG (ref 26.5–33)
MCHC RBC AUTO-ENTMCNC: 30.8 G/DL (ref 31.5–36.5)
MCV RBC AUTO: 98 FL (ref 78–100)
P AXIS - MUSE: 22 DEGREES
PLATELET # BLD AUTO: 148 10E3/UL (ref 150–450)
POTASSIUM SERPL-SCNC: 4.2 MMOL/L (ref 3.4–5.3)
PR INTERVAL - MUSE: 142 MS
QRS DURATION - MUSE: 84 MS
QT - MUSE: 398 MS
QTC - MUSE: 413 MS
R AXIS - MUSE: 18 DEGREES
RBC # BLD AUTO: 2.44 10E6/UL (ref 3.8–5.2)
SODIUM SERPL-SCNC: 133 MMOL/L (ref 135–145)
SYSTOLIC BLOOD PRESSURE - MUSE: NORMAL MMHG
T AXIS - MUSE: 47 DEGREES
TROPONIN T SERPL HS-MCNC: 144 NG/L
VENTRICULAR RATE- MUSE: 65 BPM
WBC # BLD AUTO: 5.1 10E3/UL (ref 4–11)

## 2024-08-01 PROCEDURE — 250N000011 HC RX IP 250 OP 636: Performed by: HOSPITALIST

## 2024-08-01 PROCEDURE — 250N000013 HC RX MED GY IP 250 OP 250 PS 637: Performed by: HOSPITALIST

## 2024-08-01 PROCEDURE — 97165 OT EVAL LOW COMPLEX 30 MIN: CPT | Mod: GO

## 2024-08-01 PROCEDURE — 99233 SBSQ HOSP IP/OBS HIGH 50: CPT | Mod: FS | Performed by: PHYSICIAN ASSISTANT

## 2024-08-01 PROCEDURE — 84484 ASSAY OF TROPONIN QUANT: CPT | Performed by: PHYSICIAN ASSISTANT

## 2024-08-01 PROCEDURE — 250N000013 HC RX MED GY IP 250 OP 250 PS 637: Performed by: PHYSICIAN ASSISTANT

## 2024-08-01 PROCEDURE — 80048 BASIC METABOLIC PNL TOTAL CA: CPT | Performed by: PHYSICIAN ASSISTANT

## 2024-08-01 PROCEDURE — 99231 SBSQ HOSP IP/OBS SF/LOW 25: CPT | Performed by: SURGERY

## 2024-08-01 PROCEDURE — 120N000001 HC R&B MED SURG/OB

## 2024-08-01 PROCEDURE — 250N000011 HC RX IP 250 OP 636: Performed by: INTERNAL MEDICINE

## 2024-08-01 PROCEDURE — 93010 ELECTROCARDIOGRAM REPORT: CPT | Performed by: INTERNAL MEDICINE

## 2024-08-01 PROCEDURE — 93005 ELECTROCARDIOGRAM TRACING: CPT

## 2024-08-01 PROCEDURE — 85027 COMPLETE CBC AUTOMATED: CPT | Performed by: INTERNAL MEDICINE

## 2024-08-01 PROCEDURE — 250N000013 HC RX MED GY IP 250 OP 250 PS 637: Performed by: INTERNAL MEDICINE

## 2024-08-01 PROCEDURE — 99233 SBSQ HOSP IP/OBS HIGH 50: CPT | Performed by: INTERNAL MEDICINE

## 2024-08-01 PROCEDURE — 36415 COLL VENOUS BLD VENIPUNCTURE: CPT | Performed by: PHYSICIAN ASSISTANT

## 2024-08-01 PROCEDURE — 97535 SELF CARE MNGMENT TRAINING: CPT | Mod: GO

## 2024-08-01 RX ORDER — ISOSORBIDE MONONITRATE 30 MG/1
30 TABLET, EXTENDED RELEASE ORAL DAILY
Status: DISCONTINUED | OUTPATIENT
Start: 2024-08-01 | End: 2024-08-02

## 2024-08-01 RX ADMIN — GABAPENTIN 200 MG: 100 CAPSULE ORAL at 21:16

## 2024-08-01 RX ADMIN — CARVEDILOL 12.5 MG: 12.5 TABLET, FILM COATED ORAL at 08:43

## 2024-08-01 RX ADMIN — ENOXAPARIN SODIUM 40 MG: 40 INJECTION SUBCUTANEOUS at 08:41

## 2024-08-01 RX ADMIN — FAMOTIDINE 10 MG: 10 TABLET, FILM COATED ORAL at 08:43

## 2024-08-01 RX ADMIN — NICOTINE 1 PATCH: 21 PATCH, EXTENDED RELEASE TRANSDERMAL at 10:01

## 2024-08-01 RX ADMIN — CARVEDILOL 12.5 MG: 12.5 TABLET, FILM COATED ORAL at 17:34

## 2024-08-01 RX ADMIN — HYDRALAZINE HYDROCHLORIDE 10 MG: 10 TABLET ORAL at 21:17

## 2024-08-01 RX ADMIN — FLUTICASONE FUROATE AND VILANTEROL TRIFENATATE 1 PUFF: 200; 25 POWDER RESPIRATORY (INHALATION) at 08:43

## 2024-08-01 RX ADMIN — FUROSEMIDE 60 MG: 10 INJECTION, SOLUTION INTRAMUSCULAR; INTRAVENOUS at 21:17

## 2024-08-01 RX ADMIN — ACETAMINOPHEN 650 MG: 325 TABLET, FILM COATED ORAL at 22:42

## 2024-08-01 RX ADMIN — FUROSEMIDE 60 MG: 10 INJECTION, SOLUTION INTRAMUSCULAR; INTRAVENOUS at 08:41

## 2024-08-01 RX ADMIN — NITROGLYCERIN 30 MG: 20 OINTMENT TOPICAL at 17:34

## 2024-08-01 RX ADMIN — ISOSORBIDE MONONITRATE 30 MG: 30 TABLET, EXTENDED RELEASE ORAL at 14:14

## 2024-08-01 RX ADMIN — CETIRIZINE HYDROCHLORIDE 5 MG: 5 TABLET ORAL at 08:43

## 2024-08-01 RX ADMIN — AMLODIPINE BESYLATE 10 MG: 10 TABLET ORAL at 08:43

## 2024-08-01 RX ADMIN — HYDRALAZINE HYDROCHLORIDE 10 MG: 10 TABLET ORAL at 08:43

## 2024-08-01 RX ADMIN — Medication 1 TABLET: at 08:43

## 2024-08-01 RX ADMIN — ROSUVASTATIN CALCIUM 10 MG: 10 TABLET, FILM COATED ORAL at 21:16

## 2024-08-01 RX ADMIN — HYDRALAZINE HYDROCHLORIDE 10 MG: 10 TABLET ORAL at 17:34

## 2024-08-01 RX ADMIN — CLOPIDOGREL BISULFATE 75 MG: 75 TABLET ORAL at 08:43

## 2024-08-01 ASSESSMENT — ACTIVITIES OF DAILY LIVING (ADL)
ADLS_ACUITY_SCORE: 43
ADLS_ACUITY_SCORE: 43
ADLS_ACUITY_SCORE: 44
ADLS_ACUITY_SCORE: 43
ADLS_ACUITY_SCORE: 44
ADLS_ACUITY_SCORE: 42
ADLS_ACUITY_SCORE: 43
ADLS_ACUITY_SCORE: 43
ADLS_ACUITY_SCORE: 44
ADLS_ACUITY_SCORE: 45
ADLS_ACUITY_SCORE: 45
ADLS_ACUITY_SCORE: 43
ADLS_ACUITY_SCORE: 45
ADLS_ACUITY_SCORE: 44
ADLS_ACUITY_SCORE: 45
ADLS_ACUITY_SCORE: 43
ADLS_ACUITY_SCORE: 42
ADLS_ACUITY_SCORE: 42
ADLS_ACUITY_SCORE: 43
ADLS_ACUITY_SCORE: 45
ADLS_ACUITY_SCORE: 42
ADLS_ACUITY_SCORE: 42
ADLS_ACUITY_SCORE: 44

## 2024-08-01 NOTE — PROGRESS NOTES
[facility]    Hospitalist Progress Note    Assessment & Plan   Shirley Hendricks is a 63 year old female with a history of COPD, tobacco use, hypertension, hyperlipidemia, coronary artery disease, presents to the emergency department with shortness of breath, concerns for CHF exacerbation.  Acute hypoxic respiratory failure  Acute on chronic diastolic heart failure  Moderate to severe pleural effusion on left  Symptoms have been ongoing for few weeks, discharged home on Bumex 1 mg 3 times daily for 14 days recently, has been off of diuretics since that was completed.  --Continue IV diuresis, Lasix currently creatinine is 1.25 today.  Continue to monitor 60 mg IV twice daily.  --Continue close monitoring of intake/output, daily weight, CT chest was reviewed as well as VQ scan, VQ scan indicate low probability for PE, D-dimer was elevated and Dopplers did not indicate any DVT.  --CT scan shows moderate to severe pleural effusion with collapse of the left lower lobe of the lungs, and had thoracentesis done on 7/31, her respiratory status is slightly improved but still on 8 L of O2 on Oxymizer.  -Continue Lovenox for DVT prophylaxis, wean oxygen down as possible, PT and OT consulted.  --Appreciate cardiology input, they are adjusting medications.  --BMP ordered for tomorrow AM.  Hypertension  --Blood pressures are quite elevated, continue PTA amlodipine, carvedilol and hydralazine.  Hyperlipidemia  --Continue PTA rosuvastatin  CAD  PVD  --Prior history of MI in 2019, history of 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 2024  --Continue Plavix, carvedilol and rosuvastatin, cardiology has been consulted this admission.  Chronic tobacco use disorder  --Currently smokes 5 cigarettes/day  -Continue 21 mg nicotine patch  CKD stage III  --Creatinine baseline is around 1.9-2.5, creatinine is currently trending up, present with a creatinine of 1.05  -- Creatinine currently at 1.25, ongoing diuresis.  Extranodal marginal  zone lymphoma of mucosa associated lymphoid tissue  -Continue outpatient follow-up with [place] [facility].     Discoid lupus erythematosus  -Noted.  Continue outpatient follow-up as previously arranged.     Depression  Anxiety  -Does not appear to be on any medication for this as an outpatient.     GERD  -Continue PTA famotidine.     Anemia  Baseline hemoglobin has been around 8-9 over the last few months.  Hemoglobin stable/slightly improved at 9.4 on 7/30/2024.  Hemoglobin 7.4, continue to monitor, platelet count did drop to 148, will hold off on Lovenox for now.     History of thrombocytopenia  Platelets in the 110s to 140s earlier this month.  Platelets within normal range at 249 on 7/30/2024.  -Hemoglobin is 8.3, did receive IV fluids overnight, will repeat CBC in AM.  Diet :Combination Diet 2 gm NA Diet  Snacks/Supplements Adult: Expedite Bottle; Between Meals  DVT Prophylaxis: Enoxaparin (Lovenox) SQ and this is on hold due to platelet count dropping, please review and restart as appropriate.  Code Status: Full Code     51 MINUTES SPENT BY ME on the date of service doing chart review, history, exam, documentation & further activities per the note.  Medically Ready for Discharge: Anticipated Tomorrow    Clinically Significant Risk Factors              # Hypoalbuminemia: Lowest albumin = 3.3 g/dL at 7/30/2024 12:33 PM, will monitor as appropriate     # Hypertension: Noted on problem list         #Precipitous drop in Hgb/Hct: Lowest Hgb this hospitalization: 7.4 g/dL. Will continue to monitor and treat/transfuse as appropriate.      # Severe Malnutrition: based on nutrition assessment, PRESENT ON ADMISSION   # Financial/Environmental Concerns:           Araceli Spicer MD  Text Page   (7am to 6pm)    Interval History   Continued to be on 6 L Oxymizer, had thoracentesis yesterday, symptoms improved slightly but he has left lower lobe collapse which could take time to improve, encourage incentive spirometry  use.    -Data reviewed today: I reviewed all new labs and imaging results over the last 24 hours.   Physical Exam     Vital Signs with Ranges  Temp:  [97.8  F (36.6  C)-99.8  F (37.7  C)] 99.8  F (37.7  C)  Pulse:  [59-67] 65  Resp:  [17-20] 20  BP: (165-181)/(62-77) 168/62  FiO2 (%):  [8 %-10 %] 8 %  SpO2:  [86 %-98 %] 95 %  I/O last 3 completed shifts:  In: 340 [P.O.:340]  Out: 2425 [Urine:2425]    Constitutional: Awake, alert, cooperative, no apparent distress  Respiratory: Breath sounds reduced on the left  Cardiovascular: Regular rate and rhythm, normal S1 and S2, and no murmur noted  GI: Normal bowel sounds, soft, non-distended, non-tender  Skin/Integumen:right aka with healing wound  Neuro : moving all 4 extremities, no focal deficit noted     Medications   Current Facility-Administered Medications   Medication Dose Route Frequency Provider Last Rate Last Admin    Continuing beta blocker from home medication list OR beta blocker order already placed during this visit   Does not apply DOES NOT GO TO [place] MD        Reason ACE/ARB/ARNI order not selected   Other DOES NOT GO TO [place] MD         Current Facility-Administered Medications   Medication Dose Route Frequency Provider Last Rate Last Admin    amLODIPine (NORVASC) tablet 10 mg  10 mg Oral Daily New Silva MD   10 mg at 08/01/24 0843    carvedilol (COREG) tablet 12.5 mg  12.5 mg Oral BID w/meals New Silva MD   12.5 mg at 08/01/24 0843    cetirizine (zyrTEC) tablet 5 mg  5 mg Oral Daily New Silva MD   5 mg at 08/01/24 0843    clopidogrel (PLAVIX) tablet 75 mg  75 mg Oral Daily New Silva MD   75 mg at 08/01/24 0843    enoxaparin ANTICOAGULANT (LOVENOX) injection 40 mg  0.75 mg/kg Subcutaneous Q12H New Silva MD   40 mg at 08/01/24 0841    famotidine (PEPCID) tablet 10 mg  10 mg Oral Daily New Silva MD   10 mg at 08/01/24 0843     fluticasone-vilanterol (BREO ELLIPTA) 200-25 MCG/ACT inhaler 1 puff  1 puff Inhalation Daily New Silva MD   1 puff at 08/01/24 0843    furosemide (LASIX) injection 60 mg  60 mg Intravenous Q12H Araceli Spicer MD   60 mg at 08/01/24 0841    gabapentin (NEURONTIN) capsule 200 mg  200 mg Oral At Bedtime New Silva MD   200 mg at 07/31/24 2123    hydrALAZINE (APRESOLINE) tablet 10 mg  10 mg Oral TID New Silva MD   10 mg at 08/01/24 0843    isosorbide mononitrate (IMDUR) 24 hr tablet 30 mg  30 mg Oral Daily Tamica Mcfadden PA-C   30 mg at 08/01/24 1414    multivitamin w/minerals (THERA-VIT-M) tablet 1 tablet  1 tablet Oral Daily Araceli Spicer MD   1 tablet at 08/01/24 0843    nicotine (NICODERM CQ) 21 MG/24HR 24 hr patch 1 patch  1 patch Transdermal Daily New Silva MD   1 patch at 08/01/24 1001    nitroGLYcerin (NITRO-BID) 2 % ointment 30 mg  2 inch Transdermal Q24H New Silva MD   30 mg at 07/31/24 1837    polyethylene glycol (MIRALAX) Packet 17 g  17 g Oral Daily New Silva MD   17 g at 07/31/24 0930    rosuvastatin (CRESTOR) tablet 10 mg  10 mg Oral At Bedtime New Silva MD   10 mg at 07/31/24 2124    sodium chloride (PF) 0.9% PF flush 3 mL  3 mL Intracatheter Q8H New Silva MD   3 mL at 08/01/24 1001       Data   Recent Labs   Lab 08/01/24  1057 07/31/24  1404 07/31/24  0932 07/30/24  1233   WBC 5.1  --  5.8 8.8   HGB 7.4*  --  8.3* 9.4*   MCV 98  --  100 99   *  --  171 249   * 132* 132* 136   POTASSIUM 4.2 4.5 4.8 5.0   CHLORIDE 99 98 101 104   CO2 28 28 23 24   BUN 27.4* 27.4* 26.8* 30.2*   CR 1.25* 1.27* 1.26* 1.05*   ANIONGAP 6* 6* 8 8   SONIA 8.1* 8.5* 8.4* 8.8   * 88 78 88   ALBUMIN  --   --   --  3.3*   PROTTOTAL  --  6.1*  --  6.6   BILITOTAL  --   --   --  0.3   ALKPHOS  --   --   --  57   ALT  --   --   --  17   AST  --   --   --  28     Recent Labs   Lab  08/01/24  1057 07/31/24  1404 07/31/24  0932 07/30/24  1233   * 88 78 88       Imaging:   Recent Results (from the past 24 hour(s))   US Thoracentesis    Narrative    ULTRASOUND GUIDED THORACENTESIS  7/31/2024 3:59 PM     HISTORY: Left-sided pleural effusion    FINDINGS: Ultrasound was used to evaluate for the presence and best  approach for drainage of a pleural effusion. Written and oral informed  consent was obtained. A pause for the cause procedure to verify the  correct patient and correct procedure. The skin overlying the left  chest posteriorly was prepped and draped in the usual sterile fashion.  The subcutaneous tissues were anesthetized with 10 mL 1% lidocaine. A  catheter was advanced into the pleural space and 1450 mL of  yellow  colored fluid was drained. Ultrasound images were permanently stored.   There were no immediate complications. Patient left the ultrasound  suite in satisfactory condition.      Impression    IMPRESSION: Technically successful thoracentesis without immediate  complications.    SILVIO COOK MD         SYSTEM ID:  [id number]

## 2024-08-01 NOTE — PLAN OF CARE
Goal Outcome Evaluation:      Plan of Care Reviewed With: patient               Summary: Heart Failure, BMP 32,028 on admission, SOB, CKD, hypoxia, recent fall  DATE & TIME: 7/31/24 3582-5471  Cognitive Concerns/ Orientation : A&Ox4   BEHAVIOR & AGGRESSION TOOL COLOR: Green  CIWA SCORE: N/a   ABNL VS/O2: BP elevated at times, on scheduled BP medications, Oxymask @ 9 liters.  Encouraged use of IS  MOBILITY:Not OOB this shift, up with 2 assist lift  PAIN MANAGMENT: complain of pain with repositioning and at the thoracentesis site.pain managed with scheduled meds.  DIET: 2 gram NA  BOWEL/BLADDER: Purewick in place, no BM this shift  ABNL LAB/BG: Na 132, CR 1.26, HGB 8.3,K+ 4.5  DRAIN/DEVICES: Right jugular PIV ( Iv lasix BID)  TELEMETRY RHYTHM: SR with PAC'S   SKIN: SINAN forearm wound- dressings intact, coccyx wound healed- Mepilex applied, right stump wound care completed by WOC  TESTS/PROCEDURES: US Thoracentesis, NM results showed low probability for PE  D/C DAY/GOALS/PLACE: pending improvement   OTHER IMPORTANT INFO: IV Lasix

## 2024-08-01 NOTE — PROGRESS NOTES
[facility] Cardiology Progress Note  Date of Service: 2024    Primary Cardiologist: Dr. Marlon Watson Cammy Hendricks is a 63 year old female with PMH including coronary disease ( angio showing  of an Diagonal vessel with good collaterals and moderate disease elsewhere), chronic anemia with hx of B-cell lymphoma, COPD, chronic smoker, severe peripheral vascular disease s/p BKA who was admitted on 2024 with acute on chronic HFpEF with a 7 lbs wt gain after she stopped taking her bumex 3 mg daily ten days PTA. Multiple recent admissions for volume overload as well, requiring short term HD in May.    Interim Hx: Started on IV Lasix 60 BID with 2.4L out. Creat stable from yesterday but up from baseline (1-1.3).  Subjective: Breathing better than yesterday, edema markedly improved per pt, but still appears volume up.   Notes L side discomfort which began about two weeks ago after her sister helped her with a transition that required lifting her at her sides. This bothered her last night when she was deep breathing, but better today.     Assessment:  Acute on chronic HFpEF in the setting of multiple compliance issues - stopped diuretics, 's  was on Monday, could not get transportation to cardiology clinic visit, very high sodium diet ([employer] and chipotle). Cannot weigh at home d/t fall risk.   Suspected musculoskeletal L side discomfort.     Plan:   Will repeat a troponin - admit trop was down significantly from prior admission.   Addendum: Troponin returned elevated ~140, pt not currently having chest pain, Dr. Mason reviewed cath cines and recs conservative management. Imdur added.   Please check a BMP today. Likely increase IV Lasix to TID. I/O's, daily wts, liquid intake <2L, sodium <2g/day. Goal wt is ~110 lbs.   Plan to re-start jardiance (previously discontinued d/t renal failure) pending renal trajectory.   Consideration for cardioMEMs if volume  continues to be an issue as outpt - briefly discussed today.   Discussed importance of dietary sodium reduction at home, rec'd open arms meal deliveries.   Discussed importance of calling clinic with concerns.   Will arrange serial cardiology clinic visits as able, but pt needs serial PCP visits as well.     Plan was formulated under direction of Dr. Mason.   Tamica Mcfadden PA-C  [facility] - Heart Clinic  Pager: [phone]  Text Page  (7:30am - 4pm M-F)   __________________________________________________________________________  Physical Exam   Temp: 99.8  F (37.7  C) Temp src: Axillary BP: (!) 168/62 Pulse: 65   Resp: 20 SpO2: 94 % O2 Device: Oxymask Oxygen Delivery: 11 LPM  Vitals:    07/31/24 0700 08/01/24 0631   Weight: 53.3 kg (117 lb 8.1 oz) 52.8 kg (116 lb 6.5 oz)       GENERAL:  The patient is in no apparent distress.   NECK: CVP appears normal, no masses or thyromegaly.  PULMONARY:  There is a normal respiratory effort. Diffuse crackles noted, on O2.   CARDIOVASCULAR:  RRR, normal S1 S2, no m/r/g.  GI:  Non tender abdomen with normoactive bowel sounds and no hepatosplenomegaly. There are no masses palpable.   EXTREMITIES:  No clubbing, cyanosis or edema.  VASCULAR: 2+ Pulses bilaterally in upper and lower extremities.    Medications   Current Facility-Administered Medications   Medication Dose Route Frequency Provider Last Rate Last Admin    Continuing beta blocker from home medication list OR beta blocker order already placed during this visit   Does not apply DOES NOT GO TO New Cao MD        Reason ACE/ARB/ARNI order not selected   Other DOES NOT GO TO New Cao MD         Current Facility-Administered Medications   Medication Dose Route Frequency Provider Last Rate Last Admin    amLODIPine (NORVASC) tablet 10 mg  10 mg Oral Daily New Silva MD   10 mg at 08/01/24 0843    carvedilol (COREG) tablet 12.5 mg  12.5 mg Oral BID w/meals [place],  New Osman MD   12.5 mg at 08/01/24 0843    cetirizine (zyrTEC) tablet 5 mg  5 mg Oral Daily New Silva MD   5 mg at 08/01/24 0843    clopidogrel (PLAVIX) tablet 75 mg  75 mg Oral Daily New Silva MD   75 mg at 08/01/24 0843    enoxaparin ANTICOAGULANT (LOVENOX) injection 40 mg  0.75 mg/kg Subcutaneous Q12H New Silva MD   40 mg at 08/01/24 0841    famotidine (PEPCID) tablet 10 mg  10 mg Oral Daily New Silva MD   10 mg at 08/01/24 0843    fluticasone-vilanterol (BREO ELLIPTA) 200-25 MCG/ACT inhaler 1 puff  1 puff Inhalation Daily New Silva MD   1 puff at 08/01/24 0843    furosemide (LASIX) injection 60 mg  60 mg Intravenous Q12H Araceli Spicer MD   60 mg at 08/01/24 0841    gabapentin (NEURONTIN) capsule 200 mg  200 mg Oral At Bedtime New Silva MD   200 mg at 07/31/24 2123    hydrALAZINE (APRESOLINE) tablet 10 mg  10 mg Oral TID New Silva MD   10 mg at 08/01/24 0843    multivitamin w/minerals (THERA-VIT-M) tablet 1 tablet  1 tablet Oral Daily Araceli Spicer MD   1 tablet at 08/01/24 0843    nicotine (NICODERM CQ) 21 MG/24HR 24 hr patch 1 patch  1 patch Transdermal Daily New Silva MD   1 patch at 07/31/24 0935    nitroGLYcerin (NITRO-BID) 2 % ointment 30 mg  2 inch Transdermal Q24H New Silva MD   30 mg at 07/31/24 1837    polyethylene glycol (MIRALAX) Packet 17 g  17 g Oral Daily New Silva MD   17 g at 07/31/24 0930    rosuvastatin (CRESTOR) tablet 10 mg  10 mg Oral At Bedtime New Silva MD   10 mg at 07/31/24 2124    sodium chloride (PF) 0.9% PF flush 3 mL  3 mL Intracatheter Q8H New Silva MD   3 mL at 07/31/24 1837       Data   Most Recent 3 CBC's:  Recent Labs   Lab Test 07/31/24  0932 07/30/24  1233 07/21/24  0050   WBC 5.8 8.8 6.8   HGB 8.3* 9.4* 9.0*    99 96    249 201     Most Recent 3 BMP's:  Recent Labs   Lab  Test 07/31/24  1404 07/31/24  0932 07/30/24  1233   * 132* 136   POTASSIUM 4.5 4.8 5.0   CHLORIDE 98 101 104   CO2 28 23 24   BUN 27.4* 26.8* 30.2*   CR 1.27* 1.26* 1.05*   ANIONGAP 6* 8 8   SONIA 8.5* 8.4* 8.8   GLC 88 78 88     Most Recent 3 BNP's:  Recent Labs   Lab Test 07/30/24  1233 07/21/24  0050 06/21/24  1449   NTBNPI 32,028* 7,421* 11,959*     Most Recent TSH and T4:  Recent Labs   Lab Test 06/22/24  0541 02/04/19  0934 03/28/17  0922   TSH 2.07   < > 1.37   T4  --   --  1.19    < > = values in this interval not displayed.

## 2024-08-01 NOTE — PROGRESS NOTES
VASCULAR SURGERY p    Patient very well-known to us due to her multiple vascular issues.  Has been readmitted for respiratory issues.  On oxygen but very comfortable this morning.    Requiring the right AKA after her graft occluded.  Had an ulceration over the right anterior thigh that has been treated conservatively to allow to heal by secondary intent.    Left leg bypass graft has been functioning well.    Exam: Alert and appropriate.  On high flow oxygen mask.  Breathing easily.    +3 left leg in situ bypass graft pulse   Right AKA is healed well.   Anterior thigh ulceration looks excellent with good granulation tissue and decreased size no signs of infection.    IMPRESSION: Stable from vascular standpoint.         Chadwick Lozano MD

## 2024-08-01 NOTE — PLAN OF CARE
Summary: 7/30 - Heart Failure, BMP 32,028 on admission, SOB, CKD, hypoxia, recent fall (Acute hypoxic respiratory failure, Acute on chronic diastolic heart failure, Moderate to severe pleural effusion on left)    DATE & TIME: 7/31-8/1 [id number]    Cognitive Concerns/ Orientation : A&Ox4     BEHAVIOR & AGGRESSION TOOL COLOR: Green    CIWA SCORE: N/A     ABNL VS/O2: HTN, Oximask @ 9 liters, decreased from 10L    MOBILITY:Not OOB this shift, A2/Lift, refused repositioning     PAIN MANAGMENT: denied this shift    DIET: 2 gram NA    BOWEL/BLADDER: Purewick in place    ABNL LAB/BG: CBC pending (7/31 - Na 132, CR 1.26, HGB 8.3,K+ 4.5)    DRAIN/DEVICES: Right jugular PIV SL ( Iv lasix BID)    TELEMETRY RHYTHM: NSR    SKIN: BUE forearm wounds- dressings C/D/I, right stump wound WNL, care completed by WOC    TESTS/PROCEDURES: US Thoracentesis site UTV - pt refused overnight assessment, denied pain, 1.4L removed 7/31    D/C DAY/GOALS/PLACE: pending improvement     OTHER IMPORTANT INFO: IV Lasix BID

## 2024-08-01 NOTE — PROGRESS NOTES
"   08/01/24 1200   Appointment Info   Signing Clinician's Name / Credentials (OT) Nehaltay Carlos, OTR/L   Living Environment   People in Home sibling(s);child(ifeanyi), adult   Current Living Arrangements house  (rambler)   Transportation Anticipated agency   Living Environment Comments Pt lives in rambler w/ family members. Recent stay at ARU/TCU since R AKA. Pt reports she does well with her slide board transfers from bed>WC>commode. She gets home care PT and an RN every other day to help w/ wound care mgmt.   Self-Care   Usual Activity Tolerance good   Current Activity Tolerance moderate   Equipment Currently Used at Home transfer board;wheelchair, manual;dressing device  (shoe horn)   Fall history within last six months yes   Number of times patient has fallen within last six months 1  (Fell off commode (monday) when using bathroom around midnight.)   Activity/Exercise/Self-Care Comment IND at baseline w/ ADLs. Family helps set up sponge bath supplies, pt is then able to complete on her own. Unable to shower from AKA and cannot access bathroom. Uses bedside commode, has slide board to help with all transfers.   Instrumental Activities of Daily Living (IADL)   IADL Comments Pt eats out a lot, sometimes orders groceries online.   Previous Level of Function/Home Environm   Bathing, Previous Functional Level partial assistance   Grooming, Previous Functional Level partial assistance  (set up)   General Information   Onset of Illness/Injury or Date of Surgery 07/30/24   Referring Physician New Silva MD   Patient/Family Therapy Goal Statement (OT) home   Additional Occupational Profile Info/Pertinent History of Current Problem Per chart review: \"Shirley Hendricks is a 63 year old female with a history of COPD, tobacco use, hypertension, hyperlipidemia, coronary artery disease, presents to the emergency department with shortness of breath, concerns for CHF exacerbation.  \"   General Observations and Info " unclear if pt is still a smoker since AKA will follow up. Decreased overall activity tolerance, SOB on 9L Spo2 at 92%   Cognitive Status Examination   Orientation Status orientation to person, place and time   Behavioral Issues overwhelmed easily   Follows Commands WFL   Safety Deficit at risk behavior observed;awareness of need for assistance;insight into deficits/self-awareness;judgment;problem-solving   Attention Deficit moderate deficit;selective attention;alternating attention   Executive Function Deficit moderate deficit;insight/awareness of deficits;information processing;abstract thinking   Cognitive Status Comments Pt emotionally labile when talking about PLOF, recent fall and husbands  (understandably)   Visual Perception   Impact of Vision Impairment on Function (Vision) reading glasses,   Sensory   Sensory Comments 2 wounds on both UE from fall when she was stuck between commode and wheelchair/closet, AKA wound healing still   Pain Assessment   Patient Currently in Pain Yes, see Vital Sign flowsheet   Posture   Posture Comments semi supine in bed, did not want OOB activity today   Range of Motion Comprehensive   Comment, General Range of Motion BUE WFL, RLE limited AKA, LLE appears WFL   Strength Comprehensive (MMT)   Comment, General Manual Muscle Testing (MMT) Assessment generalized weakness but typically no difficulty w/ slideboard transfers   Coordination   Coordination Comments no deficits   Bed Mobility   Comment (Bed Mobility) pt can repo IND, denies OOB acitity this date   Transfers   Transfer Comments denies OOB acitivity on eval 2/2 SOB   Transfer Skill: Bed to Chair/Chair to Bed   Transfer Comments denies OOB acitivity on eval 2/2 SOB   Sit-Stand Transfer   Sit/Stand Transfer Comments denies OOB acitivity on eval 2/2 SOB, needs slide board   Toilet Transfer   Toilet Transfer Comments purwick in place   Balance   Balance Comments impaired R AKA   Upper Body Dressing Assessment/Training    Comment, (Upper Body Dressing) set up A for [facility]   Clinical Impression   Criteria for Skilled Therapeutic Interventions Met (OT) Yes, treatment indicated   OT Diagnosis impaired activity tolerance, and IND w/ BADLs   OT Problem List-Impairments impacting ADL problems related to;activity tolerance impaired;cognition;strength   Assessment of Occupational Performance 3-5 Performance Deficits   Identified Performance Deficits Decreased independence in I/ADLs (Dressing, bathing, toileting)   Planned Therapy Interventions (OT) ADL retraining;IADL retraining;cognition;strengthening;transfer training;home program guidelines   Clinical Decision Making Complexity (OT) detailed assessment/moderate complexity   Risk & Benefits of therapy have been explained evaluation/treatment results reviewed;care plan/treatment goals reviewed;risks/benefits reviewed;patient   OT Total Evaluation Time   OT Eval, Low Complexity Minutes (15316) 9   OT Goals   Therapy Frequency (OT) 6 times/week   OT Predicted Duration/Target Date for Goal Attainment 08/15/24   OT Goals Cardiac Phase 1   OT: Hygiene/Grooming modified independent   OT: Upper Body Dressing Modified independent   OT: Lower Body Dressing Modified independent   OT: Toilet Transfer/Toileting Modified independent  (slide board)   OT: Understanding of cardiac education to maximize quality of life, condition management, and health outcomes Patient   OT: Goal 1 Pt will verbalize and demonstrate improved understanding of lifestyle changes such as diet/activity for CHF mgmt.   Self-Care/Home Management   Self-Care/Home Mgmt/ADL, Compensatory, Meal Prep Minutes (43743) 40   Treatment Detail/Skilled Intervention OT: Extensive education on low sodium diet as pt reports she typically has chipotle, arby[employer] and other fast foods for meals. Education on high sodium intake on fluid retention, cardiology also in room for part of session, they mentioned meals on wheels and other services to help  set up up for success. Cardiology talking to pt on daily weights but education on AKA and fall risk for weighing standing up; cardiology also mentioned implantation device that can help monitor heart function. Offered handouts on CHF but pt reports she does not read she would need to have her sister read it to her. Encouraged pt to look up congestive heart failure on youtube as she is interested in videos and services. Pt on 9L Spo2 today and O2 sating between 90 and 92%; Pt denies performing transfers this date, Education provided on sititng upright in bed, breathing exercises, and LE ex she can be performing in bed to continue to maintain strength while hospitalized. Therapist also wrote down information on no salt seasonings on [employer]. Pt mentioned she thinks she will need to go home on oxygen, education on several options as oxygen tubing in itself can be a fall risk/hazard   OT Discharge Planning   OT Plan sitting EOB, help wean O2, provide video resources on CHF (pt does not read well and does not want handouts), transfers w/ slide board (R AKA- uses slide board at baseline)   OT Discharge Recommendation (DC Rec) home with assist;home with home care occupational therapy   OT Rationale for DC Rec Pt reports IND in all ADLs and functional transfers (transfer board to wheelchair, BSC etc). Anticipate with continued therapy and medical management to continue to wean oxygen, home with assist in all I/ADLs and resume home OT/PT.

## 2024-08-02 ENCOUNTER — TELEPHONE (OUTPATIENT)
Dept: INTERNAL MEDICINE | Facility: CLINIC | Age: 64
End: 2024-08-02
Payer: COMMERCIAL

## 2024-08-02 ENCOUNTER — APPOINTMENT (OUTPATIENT)
Dept: OCCUPATIONAL THERAPY | Facility: CLINIC | Age: 64
DRG: 280 | End: 2024-08-02
Payer: COMMERCIAL

## 2024-08-02 ENCOUNTER — MEDICAL CORRESPONDENCE (OUTPATIENT)
Dept: HEALTH INFORMATION MANAGEMENT | Facility: CLINIC | Age: 64
End: 2024-08-02

## 2024-08-02 LAB
ANION GAP SERPL CALCULATED.3IONS-SCNC: 7 MMOL/L (ref 7–15)
BUN SERPL-MCNC: 28.8 MG/DL (ref 8–23)
CALCIUM SERPL-MCNC: 8.4 MG/DL (ref 8.8–10.4)
CHLORIDE SERPL-SCNC: 100 MMOL/L (ref 98–107)
CREAT SERPL-MCNC: 1.2 MG/DL (ref 0.51–0.95)
EGFRCR SERPLBLD CKD-EPI 2021: 50 ML/MIN/1.73M2
ERYTHROCYTE [DISTWIDTH] IN BLOOD BY AUTOMATED COUNT: 14.8 % (ref 10–15)
GLUCOSE SERPL-MCNC: 134 MG/DL (ref 70–99)
HCO3 SERPL-SCNC: 30 MMOL/L (ref 22–29)
HCT VFR BLD AUTO: 26 % (ref 35–47)
HGB BLD-MCNC: 8.2 G/DL (ref 11.7–15.7)
MCH RBC QN AUTO: 31.1 PG (ref 26.5–33)
MCHC RBC AUTO-ENTMCNC: 31.5 G/DL (ref 31.5–36.5)
MCV RBC AUTO: 99 FL (ref 78–100)
PLATELET # BLD AUTO: 154 10E3/UL (ref 150–450)
POTASSIUM SERPL-SCNC: 4 MMOL/L (ref 3.4–5.3)
RBC # BLD AUTO: 2.64 10E6/UL (ref 3.8–5.2)
SODIUM SERPL-SCNC: 137 MMOL/L (ref 135–145)
WBC # BLD AUTO: 4 10E3/UL (ref 4–11)

## 2024-08-02 PROCEDURE — 250N000011 HC RX IP 250 OP 636: Performed by: INTERNAL MEDICINE

## 2024-08-02 PROCEDURE — 250N000013 HC RX MED GY IP 250 OP 250 PS 637: Performed by: HOSPITALIST

## 2024-08-02 PROCEDURE — 120N000001 HC R&B MED SURG/OB

## 2024-08-02 PROCEDURE — 80048 BASIC METABOLIC PNL TOTAL CA: CPT | Performed by: INTERNAL MEDICINE

## 2024-08-02 PROCEDURE — 99233 SBSQ HOSP IP/OBS HIGH 50: CPT | Performed by: INTERNAL MEDICINE

## 2024-08-02 PROCEDURE — 250N000013 HC RX MED GY IP 250 OP 250 PS 637: Performed by: PHYSICIAN ASSISTANT

## 2024-08-02 PROCEDURE — 85048 AUTOMATED LEUKOCYTE COUNT: CPT | Performed by: INTERNAL MEDICINE

## 2024-08-02 PROCEDURE — 97535 SELF CARE MNGMENT TRAINING: CPT | Mod: GO

## 2024-08-02 PROCEDURE — 250N000013 HC RX MED GY IP 250 OP 250 PS 637: Performed by: INTERNAL MEDICINE

## 2024-08-02 PROCEDURE — 99233 SBSQ HOSP IP/OBS HIGH 50: CPT | Mod: FS | Performed by: PHYSICIAN ASSISTANT

## 2024-08-02 PROCEDURE — 36415 COLL VENOUS BLD VENIPUNCTURE: CPT | Performed by: INTERNAL MEDICINE

## 2024-08-02 PROCEDURE — 97530 THERAPEUTIC ACTIVITIES: CPT | Mod: GO

## 2024-08-02 RX ORDER — ISOSORBIDE MONONITRATE 60 MG/1
60 TABLET, EXTENDED RELEASE ORAL DAILY
Status: DISCONTINUED | OUTPATIENT
Start: 2024-08-03 | End: 2024-08-07 | Stop reason: HOSPADM

## 2024-08-02 RX ORDER — CARVEDILOL 12.5 MG/1
12.5 TABLET ORAL ONCE
Status: COMPLETED | OUTPATIENT
Start: 2024-08-02 | End: 2024-08-02

## 2024-08-02 RX ORDER — NICOTINE 21 MG/24HR
1 PATCH, TRANSDERMAL 24 HOURS TRANSDERMAL DAILY
Status: DISCONTINUED | OUTPATIENT
Start: 2024-08-02 | End: 2024-08-07 | Stop reason: HOSPADM

## 2024-08-02 RX ORDER — HYDRALAZINE HYDROCHLORIDE 25 MG/1
25 TABLET, FILM COATED ORAL 3 TIMES DAILY
Status: DISCONTINUED | OUTPATIENT
Start: 2024-08-02 | End: 2024-08-03

## 2024-08-02 RX ORDER — ATENOLOL 25 MG/1
25 TABLET ORAL DAILY
Status: DISCONTINUED | OUTPATIENT
Start: 2024-08-03 | End: 2024-08-06

## 2024-08-02 RX ADMIN — HYDRALAZINE HYDROCHLORIDE 10 MG: 10 TABLET ORAL at 09:16

## 2024-08-02 RX ADMIN — CLOPIDOGREL BISULFATE 75 MG: 75 TABLET ORAL at 09:16

## 2024-08-02 RX ADMIN — CARVEDILOL 12.5 MG: 12.5 TABLET, FILM COATED ORAL at 16:34

## 2024-08-02 RX ADMIN — NITROGLYCERIN 30 MG: 20 OINTMENT TOPICAL at 17:58

## 2024-08-02 RX ADMIN — GABAPENTIN 200 MG: 100 CAPSULE ORAL at 21:30

## 2024-08-02 RX ADMIN — FUROSEMIDE 60 MG: 10 INJECTION, SOLUTION INTRAMUSCULAR; INTRAVENOUS at 21:30

## 2024-08-02 RX ADMIN — ROSUVASTATIN CALCIUM 10 MG: 10 TABLET, FILM COATED ORAL at 21:30

## 2024-08-02 RX ADMIN — ACETAMINOPHEN 650 MG: 325 TABLET, FILM COATED ORAL at 12:42

## 2024-08-02 RX ADMIN — CETIRIZINE HYDROCHLORIDE 5 MG: 5 TABLET ORAL at 09:21

## 2024-08-02 RX ADMIN — NICOTINE 1 PATCH: 14 PATCH, EXTENDED RELEASE TRANSDERMAL at 11:36

## 2024-08-02 RX ADMIN — HYDRALAZINE HYDROCHLORIDE 25 MG: 25 TABLET ORAL at 16:34

## 2024-08-02 RX ADMIN — EMPAGLIFLOZIN 10 MG: 10 TABLET, FILM COATED ORAL at 14:34

## 2024-08-02 RX ADMIN — POLYETHYLENE GLYCOL 3350 17 G: 17 POWDER, FOR SOLUTION ORAL at 09:15

## 2024-08-02 RX ADMIN — ACETAMINOPHEN 650 MG: 325 TABLET, FILM COATED ORAL at 21:30

## 2024-08-02 RX ADMIN — AMLODIPINE BESYLATE 10 MG: 10 TABLET ORAL at 09:17

## 2024-08-02 RX ADMIN — ISOSORBIDE MONONITRATE 30 MG: 30 TABLET, EXTENDED RELEASE ORAL at 09:16

## 2024-08-02 RX ADMIN — FAMOTIDINE 10 MG: 10 TABLET, FILM COATED ORAL at 09:17

## 2024-08-02 RX ADMIN — FUROSEMIDE 60 MG: 10 INJECTION, SOLUTION INTRAMUSCULAR; INTRAVENOUS at 09:26

## 2024-08-02 RX ADMIN — Medication 1 TABLET: at 09:16

## 2024-08-02 RX ADMIN — HYDRALAZINE HYDROCHLORIDE 25 MG: 25 TABLET ORAL at 21:30

## 2024-08-02 RX ADMIN — FLUTICASONE FUROATE AND VILANTEROL TRIFENATATE 1 PUFF: 200; 25 POWDER RESPIRATORY (INHALATION) at 09:24

## 2024-08-02 ASSESSMENT — ACTIVITIES OF DAILY LIVING (ADL)
ADLS_ACUITY_SCORE: 43
ADLS_ACUITY_SCORE: 43
ADLS_ACUITY_SCORE: 44
ADLS_ACUITY_SCORE: 43
ADLS_ACUITY_SCORE: 43
ADLS_ACUITY_SCORE: 44
ADLS_ACUITY_SCORE: 43
ADLS_ACUITY_SCORE: 43
ADLS_ACUITY_SCORE: 44
ADLS_ACUITY_SCORE: 43
DEPENDENT_IADLS:: LAUNDRY;SHOPPING;TRANSPORTATION
ADLS_ACUITY_SCORE: 49
ADLS_ACUITY_SCORE: 44
ADLS_ACUITY_SCORE: 43
ADLS_ACUITY_SCORE: 49
ADLS_ACUITY_SCORE: 43
ADLS_ACUITY_SCORE: 44
ADLS_ACUITY_SCORE: 43
ADLS_ACUITY_SCORE: 43
ADLS_ACUITY_SCORE: 44
ADLS_ACUITY_SCORE: 44
ADLS_ACUITY_SCORE: 43

## 2024-08-02 NOTE — CONSULTS
Care Management Initial Consult    General Information  Assessment completed with: Patient,    Type of CM/SW Visit: Initial Assessment    Primary Care Provider verified and updated as needed: Yes   Readmission within the last 30 days: current reason for admission unrelated to previous admission   Return Category: New Diagnosis  Reason for Consult: discharge planning  Advance Care Planning: Advance Care Planning Reviewed: present on chart          Communication Assessment  Patient's communication style: spoken language (English or Bilingual)    Hearing Difficulty or Deaf: no   Wear Glasses or Blind: yes    Cognitive  Cognitive/Neuro/Behavioral: WDL                      Living Environment:   People in home: child(ifeanyi), adult, sibling(s)     Current living Arrangements: house      Able to return to prior arrangements: yes       Family/Social Support:  Care provided by: self (Brother and son)  Provides care for: no one  Marital Status:   Children, Sibling(s)          Description of Support System: Supportive, Involved    Support Assessment: Adequate family and caregiver support    Current Resources:   Patient receiving home care services: Yes  Skilled Home Care Services: Skilled Nursing, Physical Therapy, Occupational Therapy  Community Resources: None  Equipment currently used at home: transfer board, shower chair, wheelchair, manual  Supplies currently used at home: Incontinence Supplies    Employment/Financial:  Employment Status: disabled, retired        Financial Concerns: none           Does the patient's insurance plan have a 3 day qualifying hospital stay waiver?  No    Lifestyle & Psychosocial Needs:  Social Determinants of Health     Food Insecurity: Low Risk  (1/8/2024)    Food Insecurity     Within the past 12 months, did you worry that your food would run out before you got money to buy more?: No     Within the past 12 months, did the food you bought just not last and you didn t have money to get  more?: No   Depression: Not at risk (6/21/2024)    PHQ-2     PHQ-2 Score: 2   Housing Stability: Low Risk  (1/8/2024)    Housing Stability     Do you have housing? : Yes     Are you worried about losing your housing?: No   Tobacco Use: Medium Risk (6/21/2024)    Patient History     Smoking Tobacco Use: Former     Smokeless Tobacco Use: Never     Passive Exposure: Not on file   Financial Resource Strain: Low Risk  (1/8/2024)    Financial Resource Strain     Within the past 12 months, have you or your family members you live with been unable to get utilities (heat, electricity) when it was really needed?: No   Alcohol Use: Not on file   Transportation Needs: Low Risk  (1/8/2024)    Transportation Needs     Within the past 12 months, has lack of transportation kept you from medical appointments, getting your medicines, non-medical meetings or appointments, work, or from getting things that you need?: No   Physical Activity: Not on file   Interpersonal Safety: Low Risk  (11/13/2023)    Interpersonal Safety     Do you feel physically and emotionally safe where you currently live?: Yes     Within the past 12 months, have you been hit, slapped, kicked or otherwise physically hurt by someone?: No     Within the past 12 months, have you been humiliated or emotionally abused in other ways by your partner or ex-partner?: No   Stress: Not on file   Social Connections: Not on file   Health Literacy: Not on file       Functional Status:  Prior to admission patient needed assistance:   Dependent ADLs:: Wheelchair-with assist, Positioning  Dependent IADLs:: Laundry, Shopping, Transportation  Assesssment of Functional Status: Not at  functional baseline    Mental Health Status:  Mental Health Status: No Current Concerns       Chemical Dependency Status:  Chemical Dependency Status: No Current Concerns             Values/Beliefs:  Spiritual, Cultural Beliefs, Buddhist Practices, Values that affect care: no               Additional  Information:  Met with patient in room, introduced self and role in discharge planning.     Patient reports she lives with her son and brother in a rambler home.   At baseline she is able to transfer well with a transfer board from bed to w/c. Patient states she is able to be independent with personal cares ..Family helps set up sponge bath supplies, pt is then able to complete on her own  Patient uses a commode and the transfer board. Uses depends at night.    Patient feels she is at baseline with mobility. Patient has home RN PT and OT with [facility] and will need to be continued  at discharge .Noted patient was admitted with CHF exacerbation due to medication noncompliance and after running out of Bumex.  Patient states she has realized her mistake and will be compliant with meds,diet and monitor wt daily .  Discharge pending progress and patient will need resumption of home care at discharge.   Ne Aguilera RN  Inpatient Care Coordinator  [facility]  # [phone]

## 2024-08-02 NOTE — TELEPHONE ENCOUNTER
Spoke to pt and pt has been admitted since 7/30; explained that her clinic will followup with her after she is discharged.    Pt verbalized understanding.

## 2024-08-02 NOTE — PLAN OF CARE
PT: Orders received. Chart reviewed and discussed with OT.  PT not indicated due to patient completing functional transfers, modified independent with use of slide board. OT to continue to address activity tolerance while IP. No IP PT needs indicated.  Defer discharge recommendations to OT and medical team.  Will complete orders.

## 2024-08-02 NOTE — TELEPHONE ENCOUNTER
Reason for Call:  Appointment Request    Patient requesting this type of appt:  Hospital/ED Follow-Up     Requested provider: Vasquez Benoit    Reason patient unable to be scheduled: Not within requested timeframe    When does patient want to be seen/preferred time:  jillian called would like patient seen on Mon 8/5/24 for a ED follow up     Comments: patient was seen for shortness of breathe    Could we send this information to you in [facility] or would you prefer to receive a phone call?:   Patient would prefer a phone call   Okay to leave a detailed message?: Yes at Cell number on file:    Telephone Information:   Mobile [phone]       Call taken on 8/2/2024 at 3:50 PM by Cassie Obrien

## 2024-08-02 NOTE — TELEPHONE ENCOUNTER
Chart reviewed.  Patient currently hospitalized but potential discharge home tomorrow per cardiology note..  I am out of the office all next week.  Hospital follow-up next week will have to be arranged with one of the other IM partners in clinic next week

## 2024-08-02 NOTE — PLAN OF CARE
Summary: 7/30 - Heart Failure, BMP 32,028 on admission, SOB, CKD, hypoxia, recent fall (Acute hypoxic respiratory failure, Acute on chronic diastolic heart failure, Moderate to severe left pleural effusion    DATE & TIME: 8/1-2/24 1900 - 0730    Cognitive Concerns/ Orientation : A&O x 4     BEHAVIOR & AGGRESSION TOOL COLOR: Green - can be particular with cares    CIWA SCORE: N/A    ABNL VS/O2: HTN, Oxymizer cannula @ 5L - weaned from 8, 7    MOBILITY: Not OOB this shift, A2/Lift; T/R    PAIN MANAGMENT: Denied    DIET: 2 gram Na+    BOWEL/BLADDER: Purewick in place; Q4 UO assessments    ABNL LAB/BG: Prior shift note: critical Trop 144, MD and cardiology notified. Imdur added    DRAIN/DEVICES: R. IJTomy PIV SL     TELEMETRY RHYTHM: NSR    SKIN: BUE forearm wounds- dressing changed 8/1. R AKA foam dressing changed by WOC 8/1 - orders for every other day. C/D/I    TESTS/PROCEDURES: Thoracentesis dressing C/D/I, no drainage    D/C DAY/GOALS/PLACE: pending improvement     OTHER IMPORTANT INFO: OT/Vascular/Cardiology/WOC/Nutrition following

## 2024-08-02 NOTE — PROGRESS NOTES
[facility] Cardiology Progress Note  Date of Service: 2024    Primary Cardiologist: Dr. Marlon Watson Cammy Hendricks is a 63 year old female with PMH including coronary disease ( angio showing  of an Diagonal vessel with good collaterals and moderate disease elsewhere), chronic anemia with hx of B-cell lymphoma, COPD, chronic smoker, severe peripheral vascular disease s/p BKA who was admitted on 2024 with acute on chronic HFpEF with a 7 lbs wt gain after she stopped taking her bumex 3 mg daily ten days PTA. Multiple recent admissions for volume overload as well, requiring short term HD in May.    Interim Hx: Started on IV Lasix 60 BID with appropriate output and just a slight creat bump (1-1.2).   Subjective: Breathing and edema significantly better. O2 weaning.    Assessment:  Acute on chronic HFpEF in the setting of multiple compliance issues - stopped diuretics, 's  was on Monday, could not get transportation to cardiology clinic visit, very high sodium diet ([employer] and [employer]). Cannot weigh at home d/t fall risk.   Pleural effusion s/p L thoracentesis with 1.5L removed .  Suspected musculoskeletal L side discomfort. Troponin elevated at a similar level to prior admission. Dr. Mason reviewed cath cines and recs conservative management. Imdur added.   Uncontrolled HTN.     Plan:   Goal wt is ~110 lbs. 111 today. Once O2 weaned, stop IV Lasix, and start oral bumex 2 mg daily.   Re-start jardiance 10 mg daily.   Change Coreg to atenolol 25 mg daily (favor once daily medications in this pt with compliance issues).   Increase Imdur to 60 mg daily. Suspect she will not be compliant with hydralazine.  Consideration for cardioMEMs if volume continues to be an issue as outpt.  Discussed importance of dietary sodium reduction at home, rec'd open arms meal deliveries.   Discussed importance of calling clinic with concerns (recurrent edema, dyspnea).  Will  arrange serial cardiology clinic visits as able, but pt needs serial PCP visits as well.   Cardiology will sign off.     Plan was formulated under direction of Dr. Mason.   Tamica Mcfadden PA-C  [facility]  Pager: [phone]  Text Page  (7:30am - 4pm M-F)   __________________________________________________________________________  Physical Exam   Temp: 98.6  F (37  C) Temp src: Oral BP: (!) 164/67 Pulse: 58   Resp: 18 SpO2: 96 % O2 Device: Oxymizer cannula Oxygen Delivery: 5 LPM  Vitals:    07/31/24 0700 08/01/24 0631 08/02/24 0644   Weight: 53.3 kg (117 lb 8.1 oz) 52.8 kg (116 lb 6.5 oz) 50.4 kg (111 lb 1.8 oz)       GENERAL:  The patient is in no apparent distress.   NECK: CVP appears normal, no masses or thyromegaly.  PULMONARY:  There is a normal respiratory effort. Diffuse L-sided crackles noted, on O2.   CARDIOVASCULAR:  RRR, normal S1 S2, no m/r/g.  GI:  Non tender abdomen with normoactive bowel sounds and no hepatosplenomegaly. There are no masses palpable.   EXTREMITIES:  No clubbing, cyanosis or edema.  VASCULAR: 2+ Pulses bilaterally in upper and lower extremities.    Medications   Current Facility-Administered Medications   Medication Dose Route Frequency Provider Last Rate Last Admin    Continuing beta blocker from home medication list OR beta blocker order already placed during this visit   Does not apply DOES NOT GO TO [place] MD        Reason ACE/ARB/ARNI order not selected   Other DOES NOT GO TO [place] MD         Current Facility-Administered Medications   Medication Dose Route Frequency Provider Last Rate Last Admin    amLODIPine (NORVASC) tablet 10 mg  10 mg Oral Daily New Silva MD   10 mg at 08/02/24 0917    carvedilol (COREG) tablet 12.5 mg  12.5 mg Oral BID w/meals New Silva MD   12.5 mg at 08/01/24 1731    cetirizine (zyrTEC) tablet 5 mg  5 mg Oral Daily New Silva MD   5 mg at  08/02/24 0921    clopidogrel (PLAVIX) tablet 75 mg  75 mg Oral Daily New Silva MD   75 mg at 08/02/24 0916    [Held by provider] enoxaparin ANTICOAGULANT (LOVENOX) injection 40 mg  0.75 mg/kg Subcutaneous Q12H New Silva MD   40 mg at 08/01/24 0841    famotidine (PEPCID) tablet 10 mg  10 mg Oral Daily New Silva MD   10 mg at 08/02/24 0917    fluticasone-vilanterol (BREO ELLIPTA) 200-25 MCG/ACT inhaler 1 puff  1 puff Inhalation Daily New Silva MD   1 puff at 08/02/24 0924    furosemide (LASIX) injection 60 mg  60 mg Intravenous Q12H Araceli Spicer MD   60 mg at 08/02/24 0926    gabapentin (NEURONTIN) capsule 200 mg  200 mg Oral At Bedtime New Silva MD   200 mg at 08/01/24 2116    hydrALAZINE (APRESOLINE) tablet 25 mg  25 mg Oral TID Tal Gómez MD        isosorbide mononitrate (IMDUR) 24 hr tablet 30 mg  30 mg Oral Daily Tamica Mcfadden PA-C   30 mg at 08/02/24 0916    multivitamin w/minerals (THERA-VIT-M) tablet 1 tablet  1 tablet Oral Daily Araceli Spicer MD   1 tablet at 08/02/24 0916    nicotine (NICODERM CQ) 14 MG/24HR 24 hr patch 1 patch  1 patch Transdermal Daily Tal Gómez MD        nitroGLYcerin (NITRO-BID) 2 % ointment 30 mg  2 inch Transdermal Q24H New Silva MD   30 mg at 08/01/24 1734    polyethylene glycol (MIRALAX) Packet 17 g  17 g Oral Daily New Silva MD   17 g at 08/02/24 0915    rosuvastatin (CRESTOR) tablet 10 mg  10 mg Oral At Bedtime New Silva MD   10 mg at 08/01/24 2116    sodium chloride (PF) 0.9% PF flush 3 mL  3 mL Intracatheter Q8H New Silva MD   3 mL at 08/02/24 0944       Data   Most Recent 3 CBC's:  Recent Labs   Lab Test 08/02/24  0916 08/01/24  1057 07/31/24  0932   WBC 4.0 5.1 5.8   HGB 8.2* 7.4* 8.3*   MCV 99 98 100    148* 171     Most Recent 3 BMP's:  Recent Labs   Lab Test 08/02/24  0916 08/01/24  1057 07/31/24  1404     133* 132*   POTASSIUM 4.0 4.2 4.5   CHLORIDE 100 99 98   CO2 30* 28 28   BUN 28.8* 27.4* 27.4*   CR 1.20* 1.25* 1.27*   ANIONGAP 7 6* 6*   SONIA 8.4* 8.1* 8.5*   * 146* 88     Most Recent 3 BNP's:  Recent Labs   Lab Test 07/30/24  1233 07/21/24  0050 06/21/24  1449   NTBNPI 32,028* 7,421* 11,959*     Most Recent TSH and T4:  Recent Labs   Lab Test 06/22/24  0541 02/04/19  0934 03/28/17  0922   TSH 2.07   < > 1.37   T4  --   --  1.19    < > = values in this interval not displayed.

## 2024-08-02 NOTE — PLAN OF CARE
Goal Outcome Evaluation:  Summary: 7/30 - Heart Failure, BMP 32,028 on admission, SOB, CKD, hypoxia, recent fall (Acute hypoxic respiratory failure, Acute on chronic diastolic heart failure, Moderate to severe pleural effusion on left)    DATE & TIME:8/1 2246-7042  Cognitive Concerns/ Orientation : A&Ox4   BEHAVIOR & AGGRESSION TOOL COLOR: Green  CIWA SCORE: N/A  ABNL VS/O2: HTN, Oximask @ 6L, stats low 90s.   MOBILITY:Not OOB this shift, up with 2 assist lift  PAIN MANAGMENT: denied this shift  DIET: 2 gram NA, good intake   BOWEL/BLADDER: Purewick in place  ABNL LAB/BG: Critical Trop 144, MD and cardiology notified. Imdur added, No other complaints.   DRAIN/DEVICES: Right jugular PIV SL   TELEMETRY RHYTHM: NSR  SKIN: BUE forearm wounds- dressing changed. R AKA foam dressing changed by WOC yesterday, orders for every other day. CDI  TESTS/PROCEDURES: US Thoracentesis site band aid CDI.  D/C DAY/GOALS/PLACE: pending improvement   OTHER IMPORTANT INFO: Pt calls appropriately . Calm and cooperative this shift. No complaints.

## 2024-08-03 LAB
ANION GAP SERPL CALCULATED.3IONS-SCNC: 6 MMOL/L (ref 7–15)
BUN SERPL-MCNC: 34.7 MG/DL (ref 8–23)
CALCIUM SERPL-MCNC: 8.4 MG/DL (ref 8.8–10.4)
CHLORIDE SERPL-SCNC: 97 MMOL/L (ref 98–107)
CREAT SERPL-MCNC: 1.28 MG/DL (ref 0.51–0.95)
EGFRCR SERPLBLD CKD-EPI 2021: 46 ML/MIN/1.73M2
ERYTHROCYTE [DISTWIDTH] IN BLOOD BY AUTOMATED COUNT: 14.6 % (ref 10–15)
GLUCOSE SERPL-MCNC: 86 MG/DL (ref 70–99)
HCO3 SERPL-SCNC: 33 MMOL/L (ref 22–29)
HCT VFR BLD AUTO: 25.2 % (ref 35–47)
HEMOCCULT STL QL: NEGATIVE
HGB BLD-MCNC: 7.9 G/DL (ref 11.7–15.7)
MCH RBC QN AUTO: 30.6 PG (ref 26.5–33)
MCHC RBC AUTO-ENTMCNC: 31.3 G/DL (ref 31.5–36.5)
MCV RBC AUTO: 98 FL (ref 78–100)
PLATELET # BLD AUTO: 153 10E3/UL (ref 150–450)
POTASSIUM SERPL-SCNC: 3.9 MMOL/L (ref 3.4–5.3)
RBC # BLD AUTO: 2.58 10E6/UL (ref 3.8–5.2)
SODIUM SERPL-SCNC: 136 MMOL/L (ref 135–145)
WBC # BLD AUTO: 4.6 10E3/UL (ref 4–11)

## 2024-08-03 PROCEDURE — 250N000013 HC RX MED GY IP 250 OP 250 PS 637: Performed by: HOSPITALIST

## 2024-08-03 PROCEDURE — 80048 BASIC METABOLIC PNL TOTAL CA: CPT | Performed by: INTERNAL MEDICINE

## 2024-08-03 PROCEDURE — 250N000011 HC RX IP 250 OP 636: Performed by: INTERNAL MEDICINE

## 2024-08-03 PROCEDURE — 250N000013 HC RX MED GY IP 250 OP 250 PS 637: Performed by: INTERNAL MEDICINE

## 2024-08-03 PROCEDURE — 250N000013 HC RX MED GY IP 250 OP 250 PS 637: Performed by: PHYSICIAN ASSISTANT

## 2024-08-03 PROCEDURE — 120N000001 HC R&B MED SURG/OB

## 2024-08-03 PROCEDURE — 82272 OCCULT BLD FECES 1-3 TESTS: CPT | Performed by: INTERNAL MEDICINE

## 2024-08-03 PROCEDURE — 99233 SBSQ HOSP IP/OBS HIGH 50: CPT | Performed by: INTERNAL MEDICINE

## 2024-08-03 PROCEDURE — 85027 COMPLETE CBC AUTOMATED: CPT | Performed by: INTERNAL MEDICINE

## 2024-08-03 PROCEDURE — 250N000011 HC RX IP 250 OP 636: Performed by: HOSPITALIST

## 2024-08-03 PROCEDURE — 36415 COLL VENOUS BLD VENIPUNCTURE: CPT | Performed by: INTERNAL MEDICINE

## 2024-08-03 RX ORDER — OXYCODONE HYDROCHLORIDE 5 MG/1
5 TABLET ORAL ONCE
Status: COMPLETED | OUTPATIENT
Start: 2024-08-03 | End: 2024-08-03

## 2024-08-03 RX ORDER — PANTOPRAZOLE SODIUM 40 MG/1
40 TABLET, DELAYED RELEASE ORAL
Status: DISCONTINUED | OUTPATIENT
Start: 2024-08-03 | End: 2024-08-03

## 2024-08-03 RX ORDER — ENOXAPARIN SODIUM 100 MG/ML
40 INJECTION SUBCUTANEOUS EVERY 24 HOURS
Status: DISCONTINUED | OUTPATIENT
Start: 2024-08-03 | End: 2024-08-07 | Stop reason: HOSPADM

## 2024-08-03 RX ORDER — HYDRALAZINE HYDROCHLORIDE 50 MG/1
50 TABLET, FILM COATED ORAL 3 TIMES DAILY
Status: DISCONTINUED | OUTPATIENT
Start: 2024-08-03 | End: 2024-08-07 | Stop reason: HOSPADM

## 2024-08-03 RX ORDER — TORSEMIDE 100 MG/1
100 TABLET ORAL DAILY
Status: DISCONTINUED | OUTPATIENT
Start: 2024-08-03 | End: 2024-08-04

## 2024-08-03 RX ORDER — FONDAPARINUX SODIUM 2.5 MG/.5ML
2.5 INJECTION SUBCUTANEOUS EVERY 24 HOURS
Status: DISCONTINUED | OUTPATIENT
Start: 2024-08-03 | End: 2024-08-03

## 2024-08-03 RX ORDER — SPIRONOLACTONE 25 MG/1
25 TABLET ORAL DAILY
Status: DISCONTINUED | OUTPATIENT
Start: 2024-08-03 | End: 2024-08-04

## 2024-08-03 RX ADMIN — HYDRALAZINE HYDROCHLORIDE 50 MG: 50 TABLET ORAL at 22:37

## 2024-08-03 RX ADMIN — AMLODIPINE BESYLATE 10 MG: 10 TABLET ORAL at 09:58

## 2024-08-03 RX ADMIN — Medication 1 TABLET: at 09:58

## 2024-08-03 RX ADMIN — SPIRONOLACTONE 25 MG: 25 TABLET ORAL at 09:58

## 2024-08-03 RX ADMIN — HYDRALAZINE HYDROCHLORIDE 50 MG: 50 TABLET ORAL at 15:24

## 2024-08-03 RX ADMIN — ACETAMINOPHEN 650 MG: 325 TABLET, FILM COATED ORAL at 22:39

## 2024-08-03 RX ADMIN — NITROGLYCERIN 30 MG: 20 OINTMENT TOPICAL at 15:51

## 2024-08-03 RX ADMIN — OXYCODONE HYDROCHLORIDE 5 MG: 5 TABLET ORAL at 18:53

## 2024-08-03 RX ADMIN — CETIRIZINE HYDROCHLORIDE 5 MG: 5 TABLET ORAL at 09:59

## 2024-08-03 RX ADMIN — CALCIUM CARBONATE (ANTACID) CHEW TAB 500 MG 1000 MG: 500 CHEW TAB at 16:50

## 2024-08-03 RX ADMIN — ROSUVASTATIN CALCIUM 10 MG: 10 TABLET, FILM COATED ORAL at 22:37

## 2024-08-03 RX ADMIN — ATENOLOL 25 MG: 25 TABLET ORAL at 09:58

## 2024-08-03 RX ADMIN — EMPAGLIFLOZIN 10 MG: 10 TABLET, FILM COATED ORAL at 09:59

## 2024-08-03 RX ADMIN — NICOTINE 1 PATCH: 14 PATCH, EXTENDED RELEASE TRANSDERMAL at 09:57

## 2024-08-03 RX ADMIN — ONDANSETRON 4 MG: 4 TABLET, ORALLY DISINTEGRATING ORAL at 18:53

## 2024-08-03 RX ADMIN — ENOXAPARIN SODIUM 40 MG: 40 INJECTION SUBCUTANEOUS at 12:19

## 2024-08-03 RX ADMIN — CLOPIDOGREL BISULFATE 75 MG: 75 TABLET ORAL at 09:58

## 2024-08-03 RX ADMIN — GABAPENTIN 200 MG: 100 CAPSULE ORAL at 22:37

## 2024-08-03 RX ADMIN — POLYETHYLENE GLYCOL 3350 17 G: 17 POWDER, FOR SOLUTION ORAL at 09:57

## 2024-08-03 RX ADMIN — TORSEMIDE 100 MG: 100 TABLET ORAL at 09:58

## 2024-08-03 RX ADMIN — FLUTICASONE FUROATE AND VILANTEROL TRIFENATATE 1 PUFF: 200; 25 POWDER RESPIRATORY (INHALATION) at 10:00

## 2024-08-03 RX ADMIN — ISOSORBIDE MONONITRATE 60 MG: 60 TABLET, EXTENDED RELEASE ORAL at 09:58

## 2024-08-03 RX ADMIN — HYDRALAZINE HYDROCHLORIDE 50 MG: 50 TABLET ORAL at 09:58

## 2024-08-03 RX ADMIN — FAMOTIDINE 10 MG: 10 TABLET, FILM COATED ORAL at 09:58

## 2024-08-03 ASSESSMENT — ACTIVITIES OF DAILY LIVING (ADL)
ADLS_ACUITY_SCORE: 49
ADLS_ACUITY_SCORE: 43
ADLS_ACUITY_SCORE: 47
ADLS_ACUITY_SCORE: 48
ADLS_ACUITY_SCORE: 43
ADLS_ACUITY_SCORE: 49
ADLS_ACUITY_SCORE: 47
ADLS_ACUITY_SCORE: 49
ADLS_ACUITY_SCORE: 43
ADLS_ACUITY_SCORE: 47
ADLS_ACUITY_SCORE: 43
ADLS_ACUITY_SCORE: 49
ADLS_ACUITY_SCORE: 43
ADLS_ACUITY_SCORE: 49
ADLS_ACUITY_SCORE: 48
ADLS_ACUITY_SCORE: 49

## 2024-08-03 NOTE — PLAN OF CARE
Goal Outcome Evaluation:      Plan of Care Reviewed With: patient    Overall Patient Progress: improvingOverall Patient Progress: improving         Summary: Heart Failure, SOB, CKD, hypoxia, recent fall (Acute hypoxic respiratory failure, Acute on chronic diastolic heart failure, Moderate to severe left pleural effusion.    DATE & TIME: 08/03/2024   Cognitive Concerns/ Orientation: A&O x4, able to make needs known, directs cares, pleasant.    BEHAVIOR & AGGRESSION TOOL COLOR: Green  ABNL VS/O2: VSS x elevated BP. Using  Oxymizer cannula @ 1lpm. RA sats 87% at rest.   MOBILITY: Declines OOB this shift, T/R as pt allows.   PAIN MANAGMENT: C/O severe abd pain/bloating from what pt thinks is from eating bread containing gluten. Oxycodone x1 given.   DIET: 2 gram Na+/gluten free, fair appetite.  BOWEL/BLADDER: Incontinent of b/b. Using Purewick. Large brown BM x1.   ABNL LAB: Hgb 7.9. Creat 1.28.  DRAIN/DEVICES: R. IJ. PIV SL/patent.   TELEMETRY RHYTHM: Sinus christel-NSR.  SKIN: BUE forearm wounds- drsg changed 8/3. R AKA foam dressing changed 8/3. Coccyx drsg for protection changed 8/3. L thoracentesis site scab, RIN.  TESTS/PROCEDURES: Occult stool negative.  D/C DAY/GOALS/PLACE: Pending improvement.  OTHER IMPORTANT INFO: Reports feeling tired/bored, content watching tv. Restarted lovenox, per Pharm this was clarified with MD. PRN tums and heat pack for c/o abd discomfort in evening. Emesis x1, PRN zofran given.  OT/Vascular/Cardiology/WOC/Nutrition following.

## 2024-08-03 NOTE — PROGRESS NOTES
[facility]  Hospitalist Progress Note          Assessment and Plan:       Hypoxia    Shortness of breath    Chronic kidney disease, unspecified CKD stage    * No resolved hospital problems. *      Today's plan:  - CBC and chemistry panel reviewed.  Will repeat labs to monitor electrolytes renal function and blood count.  Check occult blood stool.  - Just including chest x-ray CT scan chest, and VQ scan reviewed.  - Medications reviewed.  Increase hydralazine to 50 mg 3 times daily, changed Lasix to torsemide and start the spironolactone.  - Discharge plan is based on patient response to oral diuretics and based on blood work with electrolytes and renal function also based on oxygen requirement.             Interval History:     no complaints and doing well; no cp, sob, n/v/d, or abd pain.              Medications:   I have reviewed this patient's current medications               Physical Exam:   Blood pressure (!) 176/64, pulse 69, temperature 98.5  F (36.9  C), temperature source Oral, resp. rate 18, weight 50.4 kg (111 lb 1.8 oz), SpO2 94%, not currently breastfeeding.      Vital Sign Ranges  Temperature Temp  Av  F (36.1  C)  Min: 92.7  F (33.7  C)  Max: 98.5  F (36.9  C)   Blood pressure Systolic (24hrs), Av , Min:158 , Max:176        Diastolic (24hrs), Av, Min:64, Max:69      Pulse Pulse  Av.5  Min: 58  Max: 69   Respirations Resp  Av  Min: 18  Max: 18   Pulse oximetry SpO2  Av %  Min: 93 %  Max: 95 %         Intake/Output Summary (Last 24 hours) at 8/3/2024 0839  Last data filed at 8/3/2024 0543  Gross per 24 hour   Intake 420 ml   Output 4150 ml   Net -3730 ml       HEENT and neck: Pupils equal and radial  Heart: Regular rate and rhythm  Lungs: Clear  Abdomen: Positive bowel sounds no tenderness  Extremities: Above-knee amputation of right leg.  No edema left leg.  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  Evaluation in the ER was concerning for congestive heart failure.  She was given a dose of IV Lasix.  Blood pressure was significantly elevated and she was given IV labetalol and hydralazine to help bring it down.  Blood pressure remained mildly elevated and she was given Nitropaste.  The hospitalist service was contacted to admit her for further evaluation and management.  She was admitted 7/30/2024     Acute hypoxic respiratory failure on admission secondary to decompensated congestive heart failure with mildly reduced ejection fraction and pleural effusion.  The patient presented with increasing shortness of breath.  Chest x-ray showed pleural effusion  CT chest revealed moderate to severe left pleural effusion.  S/p thoracentesis 7/2024.  Plan:  - Stop IV Lasix and start torsemide 100 mg daily and spironolactone 25 mg daily and monitor fluid status electrolytes and renal function.    - Continue Jardiance 10 mg daily.  - Cardiology consulted.  Appreciate their assistance.  They recommended changing carvedilol to atenolol, increased Imdur to 60 mg and signed off.    Hypertension  Hyperlipidemia  Coronary artery disease  Peripheral vascular disease status post right above-knee amputation  - Currently on amlodipine , Atenolol as directed per Cardiology and Hydralazine and  will titrated dose per blood pressure and heart rate.  Increase hydralazine to 50 mg 3 times daily on 8/3/2024  - Continue PTA statin, and Plavix     Chronic kidney disease stage III  Anemia of chronic kidney disease  - Continue to monitor renal function and avoid nephrotoxic medication.  - Continue to monitor hemoglobin and  hematocrit.     Thrombocytopenia resolved  - Stopped Lovenox and started Arixtra continue to monitor.    Anemia  - Continue to monitor hemoglobin and hematocrit.  Follow-up occult blood stool.    COPD  - PTA inhalers including albuterol inhaler and Symbicort will be resumed.     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     Nicotine dependence  - Continue nicotine patch.  Counseled about nicotine cessation.     CODE STATUS: Full code  Diet: Heart healthy diet  DVT prophylaxis: Enoxaparin subcutaneous  Disposition: Pending clinical course PT and OT evaluation and recommendation.               Data:   All laboratory data reviewed

## 2024-08-03 NOTE — PLAN OF CARE
Goal Outcome Evaluation:      Plan of Care Reviewed With: patient    Overall Patient Progress: improvingOverall Patient Progress: improving     Summary: Heart Failure, SOB, CKD, hypoxia, recent fall (Acute hypoxic respiratory failure, Acute on chronic diastolic heart failure, Moderate to severe left pleural effusion    DATE & TIME: 08/02/2024 - 08/03/2024 [id number]    Cognitive Concerns/ Orientation: A&O x 4   BEHAVIOR & AGGRESSION TOOL COLOR: Green - can be particular with cares  ABNL VS/O2: VSS on Oxymizer cannula @ 1- 2L- pt. Requested to be on 2L at bedtime; Except HTN - on scheduled meds   MOBILITY: Not OOB this shift, A2/Lift; T/R  PAIN MANAGMENT: C/o 5/10 headache PRN Tylenol given x1   DIET: 2 gram Na+  BOWEL/BLADDER: Incontinent of bowel and baldder; Purewick in place, No BM this shift   ABNL LAB/BG: See results   DRAIN/DEVICES: R. IJ. PIV SL   TELEMETRY RHYTHM: NSR  SKIN: BUE forearm wounds- dressing changed 8/1. R AKA foam dressing changed by WOC 8/1 - orders for every other day. C/D/I  TESTS/PROCEDURES: None this shift   D/C DAY/GOALS/PLACE: pending improvement  OTHER IMPORTANT INFO: OT/Vascular/Cardiology/WOC/Nutrition following

## 2024-08-03 NOTE — PLAN OF CARE
Goal Outcome Evaluation:       Summary: 7/30 - Heart Failure, BMP 32,028 on admission, SOB, CKD, hypoxia, recent fall (Acute hypoxic respiratory failure, Acute on chronic diastolic heart failure, Moderate to severe pleural effusion on left)     DATE & TIME:8/2 8117-5997  Cognitive Concerns/ Orientation : A&Ox4   BEHAVIOR & AGGRESSION TOOL COLOR: Green  CIWA SCORE: N/A  ABNL VS/O2: HTN, Oximask weaned to 1L, stats 93-94%, afebrile, HR mainly in low 60s with occasional tachycardic spikes this afternoon in the 120s-130s - MD Notified.   MOBILITY:Not OOB this shift, up with Ax2 Lift, T/R Q2.  PAIN MANAGMENT: Prn tylenol x1 for headache and pain with L arm dressing change.  DIET: 2 gram NA, good intake   BOWEL/BLADDER: Purewick in place, good UOP, on lasix.  ABNL LAB/BG: see labs  DRAIN/DEVICES: Right jugular PIV SL   TELEMETRY RHYTHM: NSR w/ PAC  SKIN: BUE forearm skin tear wounds- changed per woc orders - CDI. R AKA foam dressing changed per plan of care - CDI. Mepilex to coccyx CDI, dressing change due tomorrow.  TESTS/PROCEDURES: US Thoracentesis site band aid CDI.  D/C DAY/GOALS/PLACE: TBD, pending improvement   OTHER IMPORTANT INFO: Pt like door left half open.

## 2024-08-04 LAB
ANION GAP SERPL CALCULATED.3IONS-SCNC: 5 MMOL/L (ref 7–15)
BUN SERPL-MCNC: 38.4 MG/DL (ref 8–23)
CALCIUM SERPL-MCNC: 8.2 MG/DL (ref 8.8–10.4)
CHLORIDE SERPL-SCNC: 93 MMOL/L (ref 98–107)
CREAT SERPL-MCNC: 1.41 MG/DL (ref 0.51–0.95)
EGFRCR SERPLBLD CKD-EPI 2021: 41 ML/MIN/1.73M2
ERYTHROCYTE [DISTWIDTH] IN BLOOD BY AUTOMATED COUNT: 14.5 % (ref 10–15)
GLUCOSE SERPL-MCNC: 99 MG/DL (ref 70–99)
HCO3 SERPL-SCNC: 35 MMOL/L (ref 22–29)
HCT VFR BLD AUTO: 25.8 % (ref 35–47)
HGB BLD-MCNC: 8.3 G/DL (ref 11.7–15.7)
MCH RBC QN AUTO: 31.7 PG (ref 26.5–33)
MCHC RBC AUTO-ENTMCNC: 32.2 G/DL (ref 31.5–36.5)
MCV RBC AUTO: 99 FL (ref 78–100)
PLATELET # BLD AUTO: 156 10E3/UL (ref 150–450)
POTASSIUM SERPL-SCNC: 4.4 MMOL/L (ref 3.4–5.3)
RBC # BLD AUTO: 2.62 10E6/UL (ref 3.8–5.2)
SODIUM SERPL-SCNC: 133 MMOL/L (ref 135–145)
WBC # BLD AUTO: 5.7 10E3/UL (ref 4–11)

## 2024-08-04 PROCEDURE — 250N000013 HC RX MED GY IP 250 OP 250 PS 637: Performed by: INTERNAL MEDICINE

## 2024-08-04 PROCEDURE — 250N000011 HC RX IP 250 OP 636: Performed by: INTERNAL MEDICINE

## 2024-08-04 PROCEDURE — 120N000001 HC R&B MED SURG/OB

## 2024-08-04 PROCEDURE — 85027 COMPLETE CBC AUTOMATED: CPT | Performed by: INTERNAL MEDICINE

## 2024-08-04 PROCEDURE — 99233 SBSQ HOSP IP/OBS HIGH 50: CPT | Performed by: INTERNAL MEDICINE

## 2024-08-04 PROCEDURE — 36415 COLL VENOUS BLD VENIPUNCTURE: CPT | Performed by: INTERNAL MEDICINE

## 2024-08-04 PROCEDURE — 80048 BASIC METABOLIC PNL TOTAL CA: CPT | Performed by: INTERNAL MEDICINE

## 2024-08-04 PROCEDURE — 250N000013 HC RX MED GY IP 250 OP 250 PS 637: Performed by: PHYSICIAN ASSISTANT

## 2024-08-04 PROCEDURE — 250N000013 HC RX MED GY IP 250 OP 250 PS 637: Performed by: HOSPITALIST

## 2024-08-04 RX ORDER — SPIRONOLACTONE 25 MG
12.5 TABLET ORAL DAILY
Status: DISCONTINUED | OUTPATIENT
Start: 2024-08-05 | End: 2024-08-07 | Stop reason: HOSPADM

## 2024-08-04 RX ADMIN — ROSUVASTATIN CALCIUM 10 MG: 10 TABLET, FILM COATED ORAL at 21:25

## 2024-08-04 RX ADMIN — CLOPIDOGREL BISULFATE 75 MG: 75 TABLET ORAL at 09:04

## 2024-08-04 RX ADMIN — ISOSORBIDE MONONITRATE 60 MG: 60 TABLET, EXTENDED RELEASE ORAL at 09:03

## 2024-08-04 RX ADMIN — FLUTICASONE FUROATE AND VILANTEROL TRIFENATATE 1 PUFF: 200; 25 POWDER RESPIRATORY (INHALATION) at 09:02

## 2024-08-04 RX ADMIN — EMPAGLIFLOZIN 10 MG: 10 TABLET, FILM COATED ORAL at 09:04

## 2024-08-04 RX ADMIN — CETIRIZINE HYDROCHLORIDE 5 MG: 5 TABLET ORAL at 09:04

## 2024-08-04 RX ADMIN — HYDRALAZINE HYDROCHLORIDE 50 MG: 50 TABLET ORAL at 09:04

## 2024-08-04 RX ADMIN — TORSEMIDE 100 MG: 100 TABLET ORAL at 09:04

## 2024-08-04 RX ADMIN — HYDRALAZINE HYDROCHLORIDE 50 MG: 50 TABLET ORAL at 21:25

## 2024-08-04 RX ADMIN — SPIRONOLACTONE 25 MG: 25 TABLET ORAL at 09:04

## 2024-08-04 RX ADMIN — FAMOTIDINE 10 MG: 10 TABLET, FILM COATED ORAL at 09:04

## 2024-08-04 RX ADMIN — ENOXAPARIN SODIUM 40 MG: 40 INJECTION SUBCUTANEOUS at 11:25

## 2024-08-04 RX ADMIN — GABAPENTIN 200 MG: 100 CAPSULE ORAL at 21:25

## 2024-08-04 RX ADMIN — AMLODIPINE BESYLATE 10 MG: 10 TABLET ORAL at 09:03

## 2024-08-04 RX ADMIN — ACETAMINOPHEN 650 MG: 325 TABLET, FILM COATED ORAL at 21:25

## 2024-08-04 RX ADMIN — Medication 1 TABLET: at 09:03

## 2024-08-04 RX ADMIN — NICOTINE 1 PATCH: 14 PATCH, EXTENDED RELEASE TRANSDERMAL at 09:03

## 2024-08-04 RX ADMIN — HYDRALAZINE HYDROCHLORIDE 50 MG: 50 TABLET ORAL at 16:20

## 2024-08-04 ASSESSMENT — ACTIVITIES OF DAILY LIVING (ADL)
ADLS_ACUITY_SCORE: 42
ADLS_ACUITY_SCORE: 43
ADLS_ACUITY_SCORE: 42
ADLS_ACUITY_SCORE: 43
ADLS_ACUITY_SCORE: 42
ADLS_ACUITY_SCORE: 43
ADLS_ACUITY_SCORE: 42
ADLS_ACUITY_SCORE: 43
ADLS_ACUITY_SCORE: 42
ADLS_ACUITY_SCORE: 42
ADLS_ACUITY_SCORE: 43
ADLS_ACUITY_SCORE: 43

## 2024-08-04 NOTE — PROGRESS NOTES
MD Notification    Notified Person: MD    Notified Person Name: Dr. Fletcher    Notification Date/Time: 1830    Notification Interaction: [employer] pager    Purpose of Notification: C\O  severe abd pain\bloating and heartburn.     Orders Received: Oxycodone 5mg x1, Protonix 40mg IV.

## 2024-08-04 NOTE — PLAN OF CARE
Goal Outcome Evaluation:      Plan of Care Reviewed With: patient    Overall Patient Progress: improving     Summary: Heart Failure, SOB, CKD, hypoxia, recent fall (Acute hypoxic respiratory failure, Acute on chronic diastolic heart failure, Moderate to severe left pleural effusion.    DATE & TIME: 08/03/2024 - 08/04/2024 [id number]    Cognitive Concerns/ Orientation: A&O x4, able to make needs known, directs cares, pleasant.    BEHAVIOR & AGGRESSION TOOL COLOR: Green  ABNL VS/O2: VSS x elevated BP. Using  Oxymizer cannula @ 1 Lpm.   MOBILITY: Declines OOB this shift, T/R as pt allows.   PAIN MANAGMENT: C/o 7/10 headache PRN Tylenol given x1   DIET: 2 gram Na+/gluten free  BOWEL/BLADDER: Incontinent of b/b. Using Purewick. 1 BM this shift   ABNL LAB: Hgb 7.9. Creat 1.28.  DRAIN/DEVICES: R. IJ. PIV SL/patent.   TELEMETRY RHYTHM: NSR   SKIN: BUE forearm wounds- drsg changed 8/3. R AKA foam dressing changed 8/3. Coccyx drsg for protection changed 8/3. L thoracentesis site scab, RIN.  TESTS/PROCEDURES: None this shift  D/C DAY/GOALS/PLACE: Pending improvement.    OTHER IMPORTANT INFO: Reports feeling tired/bored, content watching tv. Restarted lovenox, per Pharm this was clarified with MD. pt, was supposed to receive Protonix this evening however pharmacy called to verify if pt. Has an allergy to the medication, I verified with the pt. And she did not know about this and refused medication - pharmacy updated   OT/Vascular/Cardiology/WOC/Nutrition following.

## 2024-08-04 NOTE — PROGRESS NOTES
[id number]  Status: Patient admitted on 7/30 with CHF exacerbation  Vitals: VSS  Neuros: Intact   IV: RIJ PIV saline locked  Labs/Electrolytes: Creatinine 1.41 trending up  Resp/trach: Lung sounds with fine crackles in left lower lobes, diminished in right lower lobe  Diet: Gluten free  Bowel status: Last BM today per patient, BS+  : Voiding spontaneously with purewick  Skin: Bi-lateral upper arm wound dressings changed this evening, wound to R knee amputation site, dressing CDI. Sacral Mepilex CDI  Pain: Denies  Activity: Not OOB turn and repo per patient request  Social: Patient particular with cares  Plan: Discharge home in next 1-2 days continue to monitor and follow pOC

## 2024-08-04 NOTE — PLAN OF CARE
Goal Outcome Evaluation:      Plan of Care Reviewed With: patient    Overall Patient Progress: improvingOverall Patient Progress: improving         Summary: Heart Failure, SOB, CKD, hypoxia, recent fall (Acute hypoxic respiratory failure, Acute on chronic diastolic heart failure, Moderate to severe left pleural effusion.    DATE & TIME: 08/04/2024   Cognitive Concerns/ Orientation: A&O x4, able to make needs known, directs cares, pleasant.    BEHAVIOR & AGGRESSION TOOL COLOR: Green  ABNL VS/O2: VSS x elevated BP. Using  Oxymizer cannula @ 1 Lpm.   MOBILITY: Declines OOB this shift, T/R as pt allows.   PAIN MANAGMENT: Denies.   DIET: 2 gram Na+/gluten free, good appetite.   BOWEL/BLADDER: Incontinent of b/b. Using Purewick. Large, BM x1.  ABNL LAB: Hgb 8.3. Na 133 Creat 1.41   DRAIN/DEVICES: R. IJ. PIV SL/patent.   TELEMETRY RHYTHM: Sinus christel, AM atenolol held per HR parameter.  SKIN: BUE forearm wounds- drsg changed 8/3, due today. R AKA foam dressing changed 8/3, due 8/5. Coccyx drsg for protection changed 8/3, due 8/6. L thoracentesis site scab, IRN.  TESTS/PROCEDURES: None this shift  D/C DAY/GOALS/PLACE: Possible home tomorrow.   OTHER IMPORTANT INFO: Content watching tv, talking on the phone or resting between cares.

## 2024-08-04 NOTE — PROVIDER NOTIFICATION
MD Notification    Notified Person: MD    Notified Person Name:   Ursulaellyn     Notification Date/Time: 8/3/2024 @ 1950    Notification Interaction: [employer] messaging     Purpose of Notification: pt. requesting Prilosec for heartburn. Pt states that Tums does not work and pt already had some Tums earlier this afternoon with minimal relief     Orders Received:    Comments:

## 2024-08-04 NOTE — PROGRESS NOTES
[facility]  Hospitalist Progress Note          Assessment and Plan:       Hypoxia    Shortness of breath    Chronic kidney disease, unspecified CKD stage    * No resolved hospital problems. *    Today's plan:  - CBC and chemistry panel reviewed.  Will repeat labs to monitor electrolytes renal function and blood count.  Check occult blood stool.  - Just including chest x-ray CT scan chest, and VQ scan reviewed.  - Medications reviewed.  Continue hydralazine to 50 mg 3 times daily, Changed torsemide to 50 mg and spironolactone  to 12.5 mg.  - Discharge plan: May discharge home tomorrow if patient continues to improve and based on her blood workup including electrolytes blood count and renal function             Interval History:     no complaints and doing well; no cp, sob, n/v/d, or abd pain.              Medications:   I have reviewed this patient's current medications               Physical Exam:   Blood pressure (!) 156/68, pulse 55, temperature 97.7  F (36.5  C), temperature source Oral, resp. rate 16, weight 50.4 kg (111 lb 1.8 oz), SpO2 94%, not currently breastfeeding.      Vital Sign Ranges  Temperature Temp  Av.3  F (36.8  C)  Min: 97.7  F (36.5  C)  Max: 98.6  F (37  C)   Blood pressure Systolic (24hrs), Av , Min:129 , Max:174        Diastolic (24hrs), Av, Min:61, Max:68      Pulse Pulse  Av.3  Min: 55  Max: 67   Respirations Resp  Av.5  Min: 16  Max: 18   Pulse oximetry SpO2  Av.3 %  Min: 92 %  Max: 95 %         Intake/Output Summary (Last 24 hours) at 2024 0932  Last data filed at 2024 0856  Gross per 24 hour   Intake 1140 ml   Output 2140 ml   Net -1000 ml       HEENT and neck: Pupils equal and radial  Heart: Regular rate and rhythm  Lungs: Clear  Abdomen: Positive bowel sounds no tenderness  Extremities: Above-knee amputation of right leg.  No edema left leg.  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  Evaluation in the ER was concerning for congestive heart failure.  She was given a dose of IV Lasix.  Blood pressure was significantly elevated and she was given IV labetalol and hydralazine to help bring it down.  Blood pressure remained mildly elevated and she was given Nitropaste.  The hospitalist service was contacted to admit her for further evaluation and management.  She was admitted 7/30/2024     Acute hypoxic respiratory failure on admission secondary to decompensated congestive heart failure with mildly reduced ejection fraction and pleural effusion.  The patient presented with increasing shortness of breath.  Chest x-ray showed pleural effusion  CT chest revealed moderate to severe left pleural effusion.  S/p thoracentesis 7/2024.  Plan:  - Stop IV Lasix and start torsemide 50 mg daily and spironolactone 12.5 mg daily and monitor fluid status electrolytes and renal function.    - Continue Jardiance 10 mg daily.  - Cardiology consulted.  Appreciate their assistance.  They recommended changing carvedilol to atenolol, increased Imdur to 60 mg and signed off.     Hypertension  Hyperlipidemia  Coronary artery disease  Peripheral vascular disease status post right above-knee amputation  - Currently on amlodipine , Atenolol as directed per Cardiology and Hydralazine and  will titrated dose per blood pressure and heart rate.  Increase hydralazine to 50 mg 3 times daily on 8/3/2024  - Continue PTA statin, and Plavix     Chronic kidney disease stage III  Anemia of chronic kidney disease  - Continue to monitor renal function and avoid nephrotoxic medication.  - Continue to monitor  hemoglobin and hematocrit.     Thrombocytopenia resolved  - Stopped Lovenox and started Arixtra continue to monitor.     Anemia  - Continue to monitor hemoglobin and hematocrit.  Follow-up occult blood stool.     COPD  - PTA inhalers including albuterol inhaler and Symbicort will be resumed.     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     Nicotine dependence  - Continue nicotine patch.  Counseled about nicotine cessation.     CODE STATUS: Full code  Diet: Heart healthy diet  DVT prophylaxis: Enoxaparin resumed 8/3/2024  Disposition: Pending clinical course PT and OT evaluation and recommendation.               Data:   All laboratory data reviewed

## 2024-08-05 ENCOUNTER — APPOINTMENT (OUTPATIENT)
Dept: OCCUPATIONAL THERAPY | Facility: CLINIC | Age: 64
DRG: 280 | End: 2024-08-05
Payer: COMMERCIAL

## 2024-08-05 LAB
ANION GAP SERPL CALCULATED.3IONS-SCNC: 8 MMOL/L (ref 7–15)
BACTERIA PLR CULT: NO GROWTH
BUN SERPL-MCNC: 44.6 MG/DL (ref 8–23)
CALCIUM SERPL-MCNC: 8.5 MG/DL (ref 8.8–10.4)
CHLORIDE SERPL-SCNC: 90 MMOL/L (ref 98–107)
CREAT SERPL-MCNC: 1.51 MG/DL (ref 0.51–0.95)
EGFRCR SERPLBLD CKD-EPI 2021: 38 ML/MIN/1.73M2
ERYTHROCYTE [DISTWIDTH] IN BLOOD BY AUTOMATED COUNT: 14.3 % (ref 10–15)
GLUCOSE SERPL-MCNC: 86 MG/DL (ref 70–99)
GRAM STAIN RESULT: NORMAL
GRAM STAIN RESULT: NORMAL
HCO3 SERPL-SCNC: 33 MMOL/L (ref 22–29)
HCT VFR BLD AUTO: 26.9 % (ref 35–47)
HGB BLD-MCNC: 8.5 G/DL (ref 11.7–15.7)
MCH RBC QN AUTO: 30.5 PG (ref 26.5–33)
MCHC RBC AUTO-ENTMCNC: 31.6 G/DL (ref 31.5–36.5)
MCV RBC AUTO: 96 FL (ref 78–100)
PLATELET # BLD AUTO: 177 10E3/UL (ref 150–450)
POTASSIUM SERPL-SCNC: 4.3 MMOL/L (ref 3.4–5.3)
RBC # BLD AUTO: 2.79 10E6/UL (ref 3.8–5.2)
SODIUM SERPL-SCNC: 131 MMOL/L (ref 135–145)
SODIUM UR-SCNC: 67 MMOL/L
WBC # BLD AUTO: 5 10E3/UL (ref 4–11)

## 2024-08-05 PROCEDURE — 97530 THERAPEUTIC ACTIVITIES: CPT | Mod: GO | Performed by: OCCUPATIONAL THERAPIST

## 2024-08-05 PROCEDURE — 250N000013 HC RX MED GY IP 250 OP 250 PS 637: Performed by: HOSPITALIST

## 2024-08-05 PROCEDURE — 250N000013 HC RX MED GY IP 250 OP 250 PS 637: Performed by: PHYSICIAN ASSISTANT

## 2024-08-05 PROCEDURE — 85027 COMPLETE CBC AUTOMATED: CPT | Performed by: HOSPITALIST

## 2024-08-05 PROCEDURE — 250N000013 HC RX MED GY IP 250 OP 250 PS 637: Performed by: INTERNAL MEDICINE

## 2024-08-05 PROCEDURE — 120N000001 HC R&B MED SURG/OB

## 2024-08-05 PROCEDURE — 99232 SBSQ HOSP IP/OBS MODERATE 35: CPT | Performed by: HOSPITALIST

## 2024-08-05 PROCEDURE — 80048 BASIC METABOLIC PNL TOTAL CA: CPT | Performed by: HOSPITALIST

## 2024-08-05 PROCEDURE — 999N000157 HC STATISTIC RCP TIME EA 10 MIN

## 2024-08-05 PROCEDURE — 36415 COLL VENOUS BLD VENIPUNCTURE: CPT | Performed by: HOSPITALIST

## 2024-08-05 PROCEDURE — 84300 ASSAY OF URINE SODIUM: CPT | Performed by: HOSPITALIST

## 2024-08-05 PROCEDURE — 250N000011 HC RX IP 250 OP 636: Performed by: INTERNAL MEDICINE

## 2024-08-05 RX ADMIN — ENOXAPARIN SODIUM 40 MG: 40 INJECTION SUBCUTANEOUS at 09:41

## 2024-08-05 RX ADMIN — POLYETHYLENE GLYCOL 3350 17 G: 17 POWDER, FOR SOLUTION ORAL at 09:40

## 2024-08-05 RX ADMIN — ROSUVASTATIN CALCIUM 10 MG: 10 TABLET, FILM COATED ORAL at 21:26

## 2024-08-05 RX ADMIN — FLUTICASONE FUROATE AND VILANTEROL TRIFENATATE 1 PUFF: 200; 25 POWDER RESPIRATORY (INHALATION) at 09:47

## 2024-08-05 RX ADMIN — Medication 1 TABLET: at 09:42

## 2024-08-05 RX ADMIN — SPIRONOLACTONE 12.5 MG: 25 TABLET ORAL at 09:42

## 2024-08-05 RX ADMIN — GABAPENTIN 200 MG: 100 CAPSULE ORAL at 21:26

## 2024-08-05 RX ADMIN — HYDRALAZINE HYDROCHLORIDE 50 MG: 50 TABLET ORAL at 21:26

## 2024-08-05 RX ADMIN — HYDRALAZINE HYDROCHLORIDE 50 MG: 50 TABLET ORAL at 16:06

## 2024-08-05 RX ADMIN — CETIRIZINE HYDROCHLORIDE 5 MG: 5 TABLET ORAL at 09:46

## 2024-08-05 RX ADMIN — NICOTINE 1 PATCH: 14 PATCH, EXTENDED RELEASE TRANSDERMAL at 09:40

## 2024-08-05 RX ADMIN — TORSEMIDE 50 MG: 20 TABLET ORAL at 09:41

## 2024-08-05 RX ADMIN — ATENOLOL 25 MG: 25 TABLET ORAL at 09:42

## 2024-08-05 RX ADMIN — HYDRALAZINE HYDROCHLORIDE 50 MG: 50 TABLET ORAL at 09:42

## 2024-08-05 RX ADMIN — AMLODIPINE BESYLATE 10 MG: 10 TABLET ORAL at 09:42

## 2024-08-05 RX ADMIN — FAMOTIDINE 10 MG: 10 TABLET, FILM COATED ORAL at 09:41

## 2024-08-05 RX ADMIN — ISOSORBIDE MONONITRATE 60 MG: 60 TABLET, EXTENDED RELEASE ORAL at 09:42

## 2024-08-05 RX ADMIN — EMPAGLIFLOZIN 10 MG: 10 TABLET, FILM COATED ORAL at 09:46

## 2024-08-05 RX ADMIN — CLOPIDOGREL BISULFATE 75 MG: 75 TABLET ORAL at 09:42

## 2024-08-05 ASSESSMENT — ACTIVITIES OF DAILY LIVING (ADL)
ADLS_ACUITY_SCORE: 44
ADLS_ACUITY_SCORE: 43
ADLS_ACUITY_SCORE: 44
ADLS_ACUITY_SCORE: 43
ADLS_ACUITY_SCORE: 44
ADLS_ACUITY_SCORE: 44
ADLS_ACUITY_SCORE: 43
ADLS_ACUITY_SCORE: 44
ADLS_ACUITY_SCORE: 43
ADLS_ACUITY_SCORE: 44
ADLS_ACUITY_SCORE: 43
ADLS_ACUITY_SCORE: 44

## 2024-08-05 NOTE — PLAN OF CARE
Summary: Heart Failure, SOB, CKD, hypoxia, recent fall (Acute hypoxic respiratory failure, Acute on chronic diastolic heart failure, Moderate to severe left pleural effusion.    DATE & TIME: 8/5/24 6860-9343    Cognitive Concerns/ Orientation: A&O x4  BEHAVIOR & AGGRESSION TOOL COLOR: Green  ABNL VS/O2: VSS on 1 L Oxymizer cannula  MOBILITY: Declines OOB this shift, T/R as pt allows- shifts self, declines repositioning  PAIN MANAGMENT: Denies  DIET: 2 gram Na+/gluten free  BOWEL/BLADDER: Incontinent of b/b. Using Purewick.   ABNL LAB: Na: 131, Cr: 1.51, hgb: 8.5  DRAIN/DEVICES: R. IJ. PIV SL  TELEMETRY RHYTHM: SB (50s)  SKIN: BUE forearm wounds- dressing changed 8/5, CDI. R AKA foam dressing changed 8/5. Coccyx drsg for protection changed 8/3, due 8/6. L thoracentesis site scab, RIN.  TESTS/PROCEDURES: Urine sodium  D/C DAY/GOALS/PLACE: Possible discharge 8/6  OTHER IMPORTANT INFO: Per MD 8/5, may need overnight oximetry study before discharge.

## 2024-08-05 NOTE — TELEPHONE ENCOUNTER
Per chart review, patient is still admitted.     Postponing to tomorrow. If patient is discharged, please contact to schedule hosp follow-up.

## 2024-08-05 NOTE — PROGRESS NOTES
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  Evaluation in the ER was concerning for congestive heart failure and was admitted on 7/30/2024      Acute hypoxic respiratory failure due to acute on chronic diastolic heart failure in exacerbation  Moderate to severe left pleural effusion status thoracentesis  Type II NSTEMI likely due to demand due to heart failure exacerbation  Hyponatremia-worsening  -Patient has been recently admitted to the hospital for acute hypoxic respiratory failure and was discharged home on Bumex 1 mg 3 times daily for 14 days and completed and on day of presentation mention that she has not been on any diuretics for the past 1 week and did not had any fever or cough  -Creatinine on admit was 1.05 with GFR of 59 , WBC count normal at 5, BNP of 32,028 and troponin peaked at 144   -Chest x-ray on admit showed moderate left and small right pleural effusion which is worsened from the prior  -CT scan of the chest on 7/31 showed similar findings of the pleural effusion and there was interstitial edema  -VQ scan on 7/31 was low probability for PE  -Echo on 7/31/2024 showed EF of 60%, normal wall motion changes and no valvular disease  -Cardiology was consulted during hospitalization and likely cause of exacerbation was medication noncompliance and running out of Bumex and she showed  -Patient showed good diuretic response during the hospital stay and medications have been adjusted and is on torsemide  50 mg daily and spironolactone 12.5 mg daily along with Crestor, atenolol, hydralazine and Jardiance  -Sodium continues to drop with mild increase in creatinine to 1.51 and sodium on 8/5 is 131  -Will check urine sodium levels    -Will repeat basic metabolic panel on 8/6 AM and have put a hold on to a.m. dose of torsemide  -Will definitely need diuretics      Hypertension  Hyperlipidemia  Coronary artery disease with history of MI in 2019  Peripheral vascular disease with history of 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 2024    -PTA regimen: Amlodipine 10 mg, Coreg 12.5 twice daily, Plavix 75 mg and Crestor 10 mg  -Cardiology consulted during the hospital stay and her medication regimen has been changed  -Continue with amlodipine 10 mg, Coreg changed to atenolol 25 mg daily, hydralazine 50 mg 3 times daily, Imdur 60 mg daily, Crestor 10 mg, spironolactone 12.5 and as above hold put on torsemide 50 mg daily with plan to change dose of torsemide on 8/6 based on sodium levels     CKD stage III  -Reviewed baseline creatinine in the past few months in EMR and it has been fluctuating and patient has required dialysis in the recent few months and creatinine has been fluctuating between 1.8-3's  -Last creatinine on 7/21 was 1.91  -Creatinine on admit was 1.05 and has been slowly uptrending with diuresis  - creatinine is stable at 1.51 and continue to monitor  -Monitor labs on 8/6 AM and adjust the dose of diuretics      History of COPD not in exacerbation  -On exam she has no wheezing and continue with Breo and as needed albuterol nebs available    Likely sleep apnea  -She does have drop in oxygen saturations when she sleeps and overnight oximetry study will be ordered      GERD  -Continue with PTA famotidine    Anemia  -Baseline hemoglobin has been between 8-9 over the past few months  -Hemoglobin seems to be stable at 8.5    History of thrombocytopenia  -Recent platelet count has been fluctuating between 110s to 140 in the past 1  month  -Platelet count is stable at 177    Tobacco use  -Continue with nicotine patch          Diet: Snacks/Supplements Adult: Expedite Bottle; Between Meals  Combination Diet 2 gm NA Diet    DVT Prophylaxis: Enoxaparin (Lovenox) SQ  Alvarez Catheter: Not present  Lines: None     Cardiac Monitoring: ACTIVE order. Indication: Acute decompensated heart failure (48 hours)  Code Status: Full Code      Clinically Significant Risk Factors              # Hypoalbuminemia: Lowest albumin = 3.3 g/dL at 7/30/2024 12:33 PM, will monitor as appropriate     # Hypertension: Noted on problem list             # Severe Malnutrition: based on nutrition assessment    # Financial/Environmental Concerns: none         Disposition Plan     Medically Ready for Discharge: Anticipated Tomorrow             Bruce Ulloa MD  Hospitalist Service  [facility]  Securely message with [employer] (more info)  Text page via [employer] Paging/Directory     This note was completed in part using dictation via the Dragon voice recognition software. Some word and grammatical errors may occur and must be interpreted in the appropriate clinical context. If there are any questions pertaining to this issue, please contact me for further clarification.      I am off service from tomorrow morning and her care will be taken over by the hospital medicine team  ______________________________________________________________________    Interval History     -Reviewed events and patient denies any shortness of breath and is happy that her swelling over the legs have significantly improved and they are wrinkled  -Does mention that when she sleeps she needs oxygen as it dips down  -No nausea, no vomiting  -Eager to go home    Physical Exam   Vital Signs: Temp: 98.4  F (36.9  C) Temp src: Oral BP: (!) 158/69 Pulse: 60   Resp: 16 SpO2: (!) 89 % (took the oxymizer cannula off i ask her can she put it back on because her oxygen was dropping she did) O2 Device:  Oxymizer cannula Oxygen Delivery: 1 LPM  Weight: 111 lbs 1.79 oz        General: aox3  HEENT: Head is atraumatic,   Respiratory: ctla  Cardiovascular: Regular rate , S1 and S2 normal with no murmer or rubs or gallops  Abdomen:   soft , non tender , non distended and bowel sound present   Skin: No skin rashes or lesions to inspection or palpation.  Neurologic: Higher functions are within normal limits. No obvious defects in speech, language and memory. No facial droop  Musculoskeletal: Normal Range of motion over upper and lower extremities bilaterally   Psychiatric: cooperative      Medical Decision Making       Time spent in care of patient is 45 minutes and I reviewed labs including basic metabolic panel, CBC and reviewed her entire hospitalization this admission and discussed plan of care in detail with the patient, nursing staff and the care management team      Data     I have personally reviewed the following data over the past 24 hrs:    5.0  \   8.5 (L)   / 177     131 (L) 90 (L) 44.6 (H) /  86   4.3 33 (H) 1.51 (H) \       Imaging results reviewed over the past 24 hrs:   No results found for this or any previous visit (from the past 24 hour(s)).

## 2024-08-05 NOTE — PLAN OF CARE
Goal Outcome Evaluation:      Plan of Care Reviewed With: patient    Overall Patient Progress: improvingOverall Patient Progress: improving     Summary: Heart Failure, SOB, CKD, hypoxia, recent fall (Acute hypoxic respiratory failure, Acute on chronic diastolic heart failure, Moderate to severe left pleural effusion.    DATE & TIME: 08/04/2024 - 08/05/2024 [id number]    Cognitive Concerns/ Orientation: A&O x4  BEHAVIOR & AGGRESSION TOOL COLOR: Green  ABNL VS/O2: VSS on 1 L Oxymizer cannula   MOBILITY: Declines OOB this shift, T/R as pt allows- declined all shift   PAIN MANAGMENT: C/o 7/10 headache PRN Tylenol given x1    DIET: 2 gram Na+/gluten free  BOWEL/BLADDER: Incontinent of b/b. Using Purewick.   ABNL LAB: see results   DRAIN/DEVICES: R. IJ. PIV SL/patent.   TELEMETRY RHYTHM: NSR  SKIN: BUE forearm wounds- drsg changed 8/3. R AKA foam dressing changed 8/3, due 8/5. Coccyx drsg for protection changed 8/3, due 8/6. L thoracentesis site scab, RIN.  TESTS/PROCEDURES: None this shift  D/C DAY/GOALS/PLACE: Possible 8/5 to home pending on labs per MD note     OTHER IMPORTANT INFO:

## 2024-08-06 LAB
ANION GAP SERPL CALCULATED.3IONS-SCNC: 7 MMOL/L (ref 7–15)
BUN SERPL-MCNC: 46.2 MG/DL (ref 8–23)
CALCIUM SERPL-MCNC: 8.6 MG/DL (ref 8.8–10.4)
CHLORIDE SERPL-SCNC: 95 MMOL/L (ref 98–107)
CREAT SERPL-MCNC: 1.45 MG/DL (ref 0.51–0.95)
EGFRCR SERPLBLD CKD-EPI 2021: 40 ML/MIN/1.73M2
GLUCOSE SERPL-MCNC: 80 MG/DL (ref 70–99)
HCO3 SERPL-SCNC: 31 MMOL/L (ref 22–29)
PLATELET # BLD AUTO: 189 10E3/UL (ref 150–450)
POTASSIUM SERPL-SCNC: 4 MMOL/L (ref 3.4–5.3)
SODIUM SERPL-SCNC: 133 MMOL/L (ref 135–145)

## 2024-08-06 PROCEDURE — 250N000013 HC RX MED GY IP 250 OP 250 PS 637: Performed by: INTERNAL MEDICINE

## 2024-08-06 PROCEDURE — 85049 AUTOMATED PLATELET COUNT: CPT | Performed by: HOSPITALIST

## 2024-08-06 PROCEDURE — 120N000001 HC R&B MED SURG/OB

## 2024-08-06 PROCEDURE — 250N000013 HC RX MED GY IP 250 OP 250 PS 637: Performed by: PHYSICIAN ASSISTANT

## 2024-08-06 PROCEDURE — 250N000013 HC RX MED GY IP 250 OP 250 PS 637: Performed by: HOSPITALIST

## 2024-08-06 PROCEDURE — 250N000011 HC RX IP 250 OP 636: Performed by: INTERNAL MEDICINE

## 2024-08-06 PROCEDURE — 80048 BASIC METABOLIC PNL TOTAL CA: CPT | Performed by: HOSPITALIST

## 2024-08-06 PROCEDURE — 94762 N-INVAS EAR/PLS OXIMTRY CONT: CPT

## 2024-08-06 PROCEDURE — 250N000013 HC RX MED GY IP 250 OP 250 PS 637: Performed by: STUDENT IN AN ORGANIZED HEALTH CARE EDUCATION/TRAINING PROGRAM

## 2024-08-06 PROCEDURE — 36415 COLL VENOUS BLD VENIPUNCTURE: CPT | Performed by: HOSPITALIST

## 2024-08-06 PROCEDURE — 99233 SBSQ HOSP IP/OBS HIGH 50: CPT | Performed by: STUDENT IN AN ORGANIZED HEALTH CARE EDUCATION/TRAINING PROGRAM

## 2024-08-06 RX ORDER — ATENOLOL 25 MG/1
12.5 TABLET ORAL DAILY
Status: DISCONTINUED | OUTPATIENT
Start: 2024-08-06 | End: 2024-08-07

## 2024-08-06 RX ADMIN — NICOTINE 1 PATCH: 14 PATCH, EXTENDED RELEASE TRANSDERMAL at 08:26

## 2024-08-06 RX ADMIN — ENOXAPARIN SODIUM 40 MG: 40 INJECTION SUBCUTANEOUS at 09:55

## 2024-08-06 RX ADMIN — GABAPENTIN 200 MG: 100 CAPSULE ORAL at 21:39

## 2024-08-06 RX ADMIN — ROSUVASTATIN CALCIUM 10 MG: 10 TABLET, FILM COATED ORAL at 21:39

## 2024-08-06 RX ADMIN — HYDRALAZINE HYDROCHLORIDE 50 MG: 50 TABLET ORAL at 21:39

## 2024-08-06 RX ADMIN — POLYETHYLENE GLYCOL 3350 17 G: 17 POWDER, FOR SOLUTION ORAL at 08:27

## 2024-08-06 RX ADMIN — CLOPIDOGREL BISULFATE 75 MG: 75 TABLET ORAL at 08:25

## 2024-08-06 RX ADMIN — AMLODIPINE BESYLATE 10 MG: 10 TABLET ORAL at 08:25

## 2024-08-06 RX ADMIN — TORSEMIDE 25 MG: 20 TABLET ORAL at 11:17

## 2024-08-06 RX ADMIN — EMPAGLIFLOZIN 10 MG: 10 TABLET, FILM COATED ORAL at 08:25

## 2024-08-06 RX ADMIN — ISOSORBIDE MONONITRATE 60 MG: 60 TABLET, EXTENDED RELEASE ORAL at 08:25

## 2024-08-06 RX ADMIN — CETIRIZINE HYDROCHLORIDE 5 MG: 5 TABLET ORAL at 08:25

## 2024-08-06 RX ADMIN — FLUTICASONE FUROATE AND VILANTEROL TRIFENATATE 1 PUFF: 200; 25 POWDER RESPIRATORY (INHALATION) at 08:24

## 2024-08-06 RX ADMIN — ATENOLOL 12.5 MG: 25 TABLET ORAL at 11:17

## 2024-08-06 RX ADMIN — FAMOTIDINE 10 MG: 10 TABLET, FILM COATED ORAL at 08:25

## 2024-08-06 RX ADMIN — Medication 1 TABLET: at 08:25

## 2024-08-06 RX ADMIN — SPIRONOLACTONE 12.5 MG: 25 TABLET ORAL at 08:25

## 2024-08-06 RX ADMIN — HYDRALAZINE HYDROCHLORIDE 50 MG: 50 TABLET ORAL at 08:25

## 2024-08-06 RX ADMIN — HYDRALAZINE HYDROCHLORIDE 50 MG: 50 TABLET ORAL at 16:46

## 2024-08-06 ASSESSMENT — ACTIVITIES OF DAILY LIVING (ADL)
ADLS_ACUITY_SCORE: 39
ADLS_ACUITY_SCORE: 39
ADLS_ACUITY_SCORE: 43
ADLS_ACUITY_SCORE: 39
ADLS_ACUITY_SCORE: 43
ADLS_ACUITY_SCORE: 39
ADLS_ACUITY_SCORE: 43
ADLS_ACUITY_SCORE: 39
ADLS_ACUITY_SCORE: 43
ADLS_ACUITY_SCORE: 43

## 2024-08-06 NOTE — PLAN OF CARE
Goal Outcome Evaluation:  Summary: Heart Failure, SOB, CKD, hypoxia, recent fall (acute hypoxic respiratory failure, acute on chronic diastolic heart failure, moderate to severe left pleural effusion.  DATE & TIME: 08/05/2024 2065-3171  Cognitive Concerns/ Orientation: A&Ox4, calm, and cooperative.   BEHAVIOR & AGGRESSION TOOL COLOR: Green  ABNL VS/O2: BP elevated (158/69, 162/64) provided scheduled hydralazine 50mg, VSS on 1 L Oxymizer cannula  MOBILITY: Not OOB this shift, T/R as pt allows- shifts self, declines repositioning at times.  PAIN MANAGMENT: Denies this shift.   DIET: 2 gram Na+/gluten free- tolerating.   BOWEL/BLADDER: Incontinent of B/B. Purewick in place.   ABNL LAB: No new labs this shift.   DRAIN/DEVICES: R. IJ PIV SL  TELEMETRY RHYTHM: Sinus christel  SKIN: BUE forearm skin tears, dressings CDI. R AKA. Coccyx meplilex for protection  L thoracentesis site scab  TESTS/PROCEDURES: None at this time.   D/C DAY/GOALS/PLACE: Possible discharge 8/6.  OTHER IMPORTANT INFO: Per MD 8/5, overnight oximetry study being conducted - started at 10pm

## 2024-08-06 NOTE — PROGRESS NOTES
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  Evaluation in the ER was concerning for congestive heart failure and was admitted on 7/30/2024        Acute hypoxic respiratory failure due to acute on chronic diastolic heart failure in exacerbation  Moderate to severe left pleural effusion status thoracentesis  Type II NSTEMI likely due to demand due to heart failure exacerbation  Hyponatremia  -Patient has been recently admitted to the hospital for acute hypoxic respiratory failure and was discharged home on Bumex 1 mg 3 times daily for 14 days and completed and on day of presentation mention that she has not been on any diuretics for the past 1 week and did not had any fever or cough  -Creatinine on admit was 1.05 with GFR of 59 , WBC count normal at 5, BNP of 32,028 and troponin peaked at 144   -Chest x-ray on admit showed moderate left and small right pleural effusion which is worsened from the prior  -CT scan of the chest on 7/31 showed similar findings of the pleural effusion and there was interstitial edema  -VQ scan on 7/31 was low probability for PE  -Echo on 7/31/2024 showed EF of 60%, normal wall motion changes and no valvular disease  -Cardiology was consulted during hospitalization and likely cause of exacerbation was medication noncompliance and running out of Bumex   -Patient showed good diuretic response during the hospital stay and medications have been adjusted and is on torsemide 50 mg daily and spironolactone 12.5 mg daily along with Crestor, atenolol,  hydralazine and Jardiance  - Sodium continues to drop with mild increase in creatinine to 1.51 and sodium on 8/5 is 131  - Sodium improved to 133 and Cr improved to 1.45   - Will restart Torsemide at lower dose of 25mg      Hypertension  Hyperlipidemia  Coronary artery disease with history of MI in 2019  Peripheral vascular disease with history of 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 2024     -PTA regimen: Amlodipine 10 mg, Coreg 12.5 twice daily, Plavix 75 mg and Crestor 10 mg  - Cardiology consulted during the hospital stay and her medication regimen has been changed  - Continue with amlodipine 10 mg, Coreg changed to atenolol 25 mg daily, hydralazine 50 mg 3 times daily, Imdur 60 mg daily, Crestor 10 mg, spironolactone 12.5   - Restart Torsemide at lower dose of 25mg      CKD stage III  -Reviewed baseline creatinine in the past few months in EMR and it has been fluctuating and patient has required dialysis in the recent few months and creatinine has been fluctuating between 1.8-3's  -Last creatinine on 7/21 was 1.91  -Creatinine on admit was 1.05 and has been slowly uptrending with diuresis  - creatinine is stable at 1.51 and continue to monitor  - Creatinine was 1.45 on 8/6/24   - Continue to monitor     History of COPD not in exacerbation  -On exam she has no wheezing and continue with Breo and as needed albuterol nebs available     Likely sleep apnea  - Overnight oximetry did not show any significant desaturations. Will not qualify for overnight oxygen      GERD  -Continue with PTA famotidine     Anemia  -Baseline hemoglobin has been between 8-9 over the past few months  - Hemoglobin seems to be stable at 8.5     History of thrombocytopenia  - Recent platelet count has been fluctuating between 110s to 140 in the past 1 month  - Platelet count is stable at 177     Tobacco use  -Continue with nicotine patch       Clinically Significant Risk Factors              # Hypoalbuminemia: Lowest albumin = 3.3 g/dL at 7/30/2024 12:33 PM,  will monitor as appropriate     # Hypertension: Noted on problem list             # Severe Malnutrition: based on nutrition assessment    # Financial/Environmental Concerns: none           Diet: Snacks/Supplements Adult: Expedite Bottle; Between Meals  Combination Diet 2 gm NA Diet     DVT Prophylaxis: Pneumatic Compression Devices   Alvarez Catheter: Not present  Lines: None     Cardiac Monitoring: ACTIVE order. Indication: Acute decompensated heart failure (48 hours)  Code Status: Full Code      Disposition Plan       Expected Discharge Date: 08/07/2024      Destination: home with family;inpatient hospice  Discharge Comments: IV diuresis  cards following  9 L oxygen      Entered: Sofia Cristobal MD 08/06/2024, 3:44 PM     Care Team Updated: Yes    Disposition: Potentially tomorrow pending sodium and Cr      Medically Ready for Discharge: Anticipated Tomorrow        Sofia Cristobal MD  Hospitalist Service   [facility]  Securely message with the Vocera Web Console (learn more here)         Medical Decision Making       60 MINUTES SPENT BY ME on the date of service doing chart review, history, exam, documentation & further activities per the note.           Interval History     No acute overnight events.    This morning the patient states that she is frustrated. Wants to go home. Feels like she lives in the hospital. Provided reassurance.     -Data reviewed today: I reviewed all new labs and imaging results over the last 24 hours.     Physical Exam   Temp: 98.6  F (37  C) Temp src: Oral BP: (!) 156/73 Pulse: 57   Resp: 18 SpO2: 94 % O2 Device: None (Room air) Oxygen Delivery: 1 LPM  Vitals:    07/31/24 0700 08/01/24 0631 08/02/24 0644   Weight: 53.3 kg (117 lb 8.1 oz) 52.8 kg (116 lb 6.5 oz) 50.4 kg (111 lb 1.8 oz)     Vital Signs with Ranges  Temp:  [97.6  F (36.4  C)-98.7  F (37.1  C)] 98.6  F (37  C)  Pulse:  [55-60] 57  Resp:  [16-20] 18  BP: (156-191)/(64-78) 156/73  SpO2:  [89  %-97 %] 94 %  I/O last 3 completed shifts:  In: 640 [P.O.:640]  Out: 1300 [Urine:1300]      Constitutional: Awake, alert, cooperative, no apparent distress.    HEENT: PERRL, Normocephalic, without obvious abnormality, atraumatic, oral pharynx with moist mucus membranes  Pulmonary: Clear to auscultation bilaterally, no crackles or wheezing.  Cardiovascular: Regular rate and rhythm, normal S1 and S2  GI: Normal bowel sounds, soft, non-distended, non-tender.  Skin/Integumen: Visualized skin appeared clear.  Neuro: CN II-XII grossly intact.  Psych:  Alert and oriented x 3.   Extremities: No lower extremity edema noted, R BKA notes       Medications   Current Facility-Administered Medications   Medication Dose Route Frequency Provider Last Rate Last Admin    Continuing beta blocker from home medication list OR beta blocker order already placed during this visit   Does not apply DOES NOT GO TO [place] MD        Reason ACE/ARB/ARNI order not selected   Other DOES NOT GO TO [place] MD         Current Facility-Administered Medications   Medication Dose Route Frequency Provider Last Rate Last Admin    amLODIPine (NORVASC) tablet 10 mg  10 mg Oral Daily New Silva MD   10 mg at 08/06/24 0825    atenolol (TENORMIN) half-tab 12.5 mg  12.5 mg Oral Daily Sofia Cristobal MD   12.5 mg at 08/06/24 1117    cetirizine (zyrTEC) tablet 5 mg  5 mg Oral Daily New Silva MD   5 mg at 08/06/24 0825    clopidogrel (PLAVIX) tablet 75 mg  75 mg Oral Daily New Silva MD   75 mg at 08/06/24 0825    empagliflozin (JARDIANCE) tablet 10 mg  10 mg Oral Daily Tamica Mcfadden PA-C   10 mg at 08/06/24 0825    enoxaparin ANTICOAGULANT (LOVENOX) injection 40 mg  40 mg Subcutaneous Q24H Tal Gómez MD   40 mg at 08/06/24 0955    famotidine (PEPCID) tablet 10 mg  10 mg Oral Daily New Silva MD   10 mg at 08/06/24 0825    fluticasone-vilanterol  (BREO ELLIPTA) 200-25 MCG/ACT inhaler 1 puff  1 puff Inhalation Daily New Silva MD   1 puff at 08/06/24 0824    gabapentin (NEURONTIN) capsule 200 mg  200 mg Oral At Bedtime New Silva MD   200 mg at 08/05/24 2126    hydrALAZINE (APRESOLINE) tablet 50 mg  50 mg Oral TID Tal Gómez MD   50 mg at 08/06/24 0825    isosorbide mononitrate (IMDUR) 24 hr tablet 60 mg  60 mg Oral Daily Tamica Mcfadden PA-C   60 mg at 08/06/24 0825    multivitamin w/minerals (THERA-VIT-M) tablet 1 tablet  1 tablet Oral Daily Araceli Spicer MD   1 tablet at 08/06/24 0825    nicotine (NICODERM CQ) 14 MG/24HR 24 hr patch 1 patch  1 patch Transdermal Daily Tal Gómez MD   1 patch at 08/06/24 0826    nitroGLYcerin (NITRO-BID) 2 % ointment 30 mg  2 inch Transdermal Q24H New Silva MD   30 mg at 08/03/24 1551    polyethylene glycol (MIRALAX) Packet 17 g  17 g Oral Daily New Silva MD   17 g at 08/06/24 0827    rosuvastatin (CRESTOR) tablet 10 mg  10 mg Oral At Bedtime New Silva MD   10 mg at 08/05/24 2126    sodium chloride (PF) 0.9% PF flush 3 mL  3 mL Intracatheter Q8H New Silva MD   3 mL at 08/06/24 0955    spironolactone (ALDACTONE) half-tab 12.5 mg  12.5 mg Oral Daily Tal Gómez MD   12.5 mg at 08/06/24 0825    torsemide (DEMADEX) tablet 25 mg  25 mg Oral Daily Sofia Cristobal MD   25 mg at 08/06/24 1117       Data   Recent Labs   Lab 08/06/24  0708 08/05/24  1126 08/04/24  1055 08/03/24  0610 08/01/24  1057 07/31/24  1404   WBC  --  5.0 5.7 4.6   < >  --    HGB  --  8.5* 8.3* 7.9*   < >  --    MCV  --  96 99 98   < >  --     177 156 153   < >  --    * 131* 133* 136   < > 132*   POTASSIUM 4.0 4.3 4.4 3.9   < > 4.5   CHLORIDE 95* 90* 93* 97*   < > 98   CO2 31* 33* 35* 33*   < > 28   BUN 46.2* 44.6* 38.4* 34.7*   < > 27.4*   CR 1.45* 1.51* 1.41* 1.28*   < > 1.27*   ANIONGAP 7 8 5* 6*   < > 6*   SONIA 8.6* 8.5* 8.2* 8.4*   < >  8.5*   GLC 80 86 99 86   < > 88   PROTTOTAL  --   --   --   --   --  6.1*    < > = values in this interval not displayed.       No results found for this or any previous visit (from the past 24 hour(s)).

## 2024-08-06 NOTE — TELEPHONE ENCOUNTER
Upon chart review, patient is still admitted to the hospital.    Will postpone encounter and follow-up tomorrow. If patient has been discharged, please reach out to the patient and complete hospital follow-up questions and assist in getting patient scheduled for a follow-up appointment.     Thank you,  Mary Murillo RN

## 2024-08-06 NOTE — PLAN OF CARE
Goal Outcome Evaluation:      Plan of Care Reviewed With: patient    Overall Patient Progress: improvingOverall Patient Progress: improving         Summary: Heart Failure, SOB, CKD, hypoxia, recent fall (acute hypoxic respiratory failure, acute on chronic diastolic heart failure, moderate to severe left pleural effusion.  DATE & TIME: 08/6/24 Day   Cognitive Concerns/ Orientation: A&Ox4, calm, coop, pleasant.  BEHAVIOR & AGGRESSION TOOL COLOR: Green  ABNL VS/O2: BP elevated, MD aware. Stable on RA while awake.   MOBILITY: Not OOB this shift, T/R as pt allows- weight shifts self.  PAIN MANAGMENT: Denies.  DIET: 2 gram Na+/gluten free- tolerating.   BOWEL/BLADDER: Incontinent of B/B. Purewick in place. No BM.   ABNL LAB: Na 133, creat 1.45.  DRAIN/DEVICES: R. IJ PIV SL  TELEMETRY RHYTHM: Sinus christel  SKIN: BUE forearm skin tear dressings change due today when pt allows. R AKA drsg CDI, due to change tomorrow. Coccyx meplilex for protection.   TESTS/PROCEDURES: None.  D/C DAY/GOALS/PLACE: Home once BP and kidney function stable 8/6.  OTHER IMPORTANT INFO: Tearful about situation, wants to go home, comfort provided by MD and RN. Resting or watching tv between cares.

## 2024-08-06 NOTE — PLAN OF CARE
Goal Outcome Evaluation:       Summary: Heart Failure, SOB, CKD, hypoxia, recent fall (acute hypoxic respiratory failure, acute on chronic diastolic heart failure, moderate to severe left pleural effusion.  DATE & TIME: 08/05/24-8/6/24 9157-9153  Cognitive Concerns/ Orientation: A&Ox4, calm, and cooperative, flat affect  BEHAVIOR & AGGRESSION TOOL COLOR: Green  ABNL VS/O2: BP elevated (176/70, 165/65)  VSS on 1 L Oxymizer cannula- O2 study overnight  MOBILITY: Not OOB this shift, T/R as pt allows- shifts self, declines repositioning at times  PAIN MANAGMENT: Denies this shift.   DIET: 2 gram Na+/gluten free- tolerating.   BOWEL/BLADDER: Incontinent of B/B. Purewick in place.   ABNL LAB: No new labs this shift.   DRAIN/DEVICES: R. IJ PIV SL  TELEMETRY RHYTHM: Sinus christel  SKIN: BUE forearm skin tears, dressings CDI. R AKA. Coccyx meplilex for protection  L thoracentesis site scab  TESTS/PROCEDURES: None at this time.   D/C DAY/GOALS/PLACE: Possible discharge 8/6.  OTHER IMPORTANT INFO:  overnight oximetry study completed

## 2024-08-07 VITALS
HEART RATE: 56 BPM | WEIGHT: 104.06 LBS | SYSTOLIC BLOOD PRESSURE: 158 MMHG | RESPIRATION RATE: 20 BRPM | OXYGEN SATURATION: 94 % | DIASTOLIC BLOOD PRESSURE: 67 MMHG | TEMPERATURE: 97.9 F | BODY MASS INDEX: 19.66 KG/M2

## 2024-08-07 LAB
ANION GAP SERPL CALCULATED.3IONS-SCNC: 6 MMOL/L (ref 7–15)
BUN SERPL-MCNC: 52 MG/DL (ref 8–23)
CALCIUM SERPL-MCNC: 8.7 MG/DL (ref 8.8–10.4)
CHLORIDE SERPL-SCNC: 95 MMOL/L (ref 98–107)
CREAT SERPL-MCNC: 1.4 MG/DL (ref 0.51–0.95)
EGFRCR SERPLBLD CKD-EPI 2021: 42 ML/MIN/1.73M2
GLUCOSE SERPL-MCNC: 80 MG/DL (ref 70–99)
HCO3 SERPL-SCNC: 30 MMOL/L (ref 22–29)
POTASSIUM SERPL-SCNC: 4.1 MMOL/L (ref 3.4–5.3)
SODIUM SERPL-SCNC: 131 MMOL/L (ref 135–145)

## 2024-08-07 PROCEDURE — 36415 COLL VENOUS BLD VENIPUNCTURE: CPT | Performed by: STUDENT IN AN ORGANIZED HEALTH CARE EDUCATION/TRAINING PROGRAM

## 2024-08-07 PROCEDURE — 80048 BASIC METABOLIC PNL TOTAL CA: CPT | Performed by: STUDENT IN AN ORGANIZED HEALTH CARE EDUCATION/TRAINING PROGRAM

## 2024-08-07 PROCEDURE — 250N000013 HC RX MED GY IP 250 OP 250 PS 637: Performed by: INTERNAL MEDICINE

## 2024-08-07 PROCEDURE — 99239 HOSP IP/OBS DSCHRG MGMT >30: CPT | Performed by: STUDENT IN AN ORGANIZED HEALTH CARE EDUCATION/TRAINING PROGRAM

## 2024-08-07 PROCEDURE — 250N000013 HC RX MED GY IP 250 OP 250 PS 637: Performed by: STUDENT IN AN ORGANIZED HEALTH CARE EDUCATION/TRAINING PROGRAM

## 2024-08-07 PROCEDURE — G0463 HOSPITAL OUTPT CLINIC VISIT: HCPCS

## 2024-08-07 PROCEDURE — 250N000013 HC RX MED GY IP 250 OP 250 PS 637: Performed by: PHYSICIAN ASSISTANT

## 2024-08-07 PROCEDURE — 250N000013 HC RX MED GY IP 250 OP 250 PS 637: Performed by: HOSPITALIST

## 2024-08-07 RX ORDER — SPIRONOLACTONE 25 MG/1
12.5 TABLET ORAL DAILY
Qty: 15 TABLET | Refills: 0 | Status: SHIPPED | OUTPATIENT
Start: 2024-08-08 | End: 2024-08-29

## 2024-08-07 RX ORDER — TORSEMIDE 10 MG/1
50 TABLET ORAL DAILY
Qty: 150 TABLET | Refills: 0 | Status: SHIPPED | OUTPATIENT
Start: 2024-08-08 | End: 2024-09-07

## 2024-08-07 RX ORDER — ISOSORBIDE MONONITRATE 60 MG/1
60 TABLET, EXTENDED RELEASE ORAL DAILY
Qty: 30 TABLET | Refills: 0 | Status: SHIPPED | OUTPATIENT
Start: 2024-08-08 | End: 2024-08-29

## 2024-08-07 RX ORDER — HYDRALAZINE HYDROCHLORIDE 50 MG/1
50 TABLET, FILM COATED ORAL 3 TIMES DAILY
Qty: 90 TABLET | Refills: 0 | Status: SHIPPED | OUTPATIENT
Start: 2024-08-07 | End: 2024-09-23

## 2024-08-07 RX ORDER — ATENOLOL 25 MG/1
25 TABLET ORAL DAILY
Qty: 30 TABLET | Refills: 0 | Status: SHIPPED | OUTPATIENT
Start: 2024-08-08 | End: 2024-08-29

## 2024-08-07 RX ORDER — ATENOLOL 25 MG/1
25 TABLET ORAL DAILY
Status: DISCONTINUED | OUTPATIENT
Start: 2024-08-07 | End: 2024-08-07 | Stop reason: HOSPADM

## 2024-08-07 RX ORDER — MULTIPLE VITAMINS W/ MINERALS TAB 9MG-400MCG
1 TAB ORAL DAILY
Qty: 30 TABLET | Refills: 0 | Status: SHIPPED | OUTPATIENT
Start: 2024-08-08 | End: 2024-08-29

## 2024-08-07 RX ADMIN — CETIRIZINE HYDROCHLORIDE 5 MG: 5 TABLET ORAL at 07:39

## 2024-08-07 RX ADMIN — HYDRALAZINE HYDROCHLORIDE 50 MG: 50 TABLET ORAL at 07:40

## 2024-08-07 RX ADMIN — CLOPIDOGREL BISULFATE 75 MG: 75 TABLET ORAL at 07:39

## 2024-08-07 RX ADMIN — EMPAGLIFLOZIN 10 MG: 10 TABLET, FILM COATED ORAL at 07:40

## 2024-08-07 RX ADMIN — Medication 1 TABLET: at 07:39

## 2024-08-07 RX ADMIN — FAMOTIDINE 10 MG: 10 TABLET, FILM COATED ORAL at 07:39

## 2024-08-07 RX ADMIN — POLYETHYLENE GLYCOL 3350 17 G: 17 POWDER, FOR SOLUTION ORAL at 07:38

## 2024-08-07 RX ADMIN — ISOSORBIDE MONONITRATE 60 MG: 60 TABLET, EXTENDED RELEASE ORAL at 07:40

## 2024-08-07 RX ADMIN — NICOTINE 1 PATCH: 14 PATCH, EXTENDED RELEASE TRANSDERMAL at 07:38

## 2024-08-07 RX ADMIN — SPIRONOLACTONE 12.5 MG: 25 TABLET ORAL at 07:39

## 2024-08-07 RX ADMIN — AMLODIPINE BESYLATE 10 MG: 10 TABLET ORAL at 07:39

## 2024-08-07 RX ADMIN — TORSEMIDE 25 MG: 20 TABLET ORAL at 07:40

## 2024-08-07 RX ADMIN — TORSEMIDE 25 MG: 20 TABLET ORAL at 11:11

## 2024-08-07 RX ADMIN — ATENOLOL 25 MG: 25 TABLET ORAL at 08:14

## 2024-08-07 RX ADMIN — FLUTICASONE FUROATE AND VILANTEROL TRIFENATATE 1 PUFF: 200; 25 POWDER RESPIRATORY (INHALATION) at 07:39

## 2024-08-07 ASSESSMENT — ACTIVITIES OF DAILY LIVING (ADL)
ADLS_ACUITY_SCORE: 44
ADLS_ACUITY_SCORE: 44
ADLS_ACUITY_SCORE: 43
ADLS_ACUITY_SCORE: 44
ADLS_ACUITY_SCORE: 43
ADLS_ACUITY_SCORE: 44

## 2024-08-07 NOTE — PLAN OF CARE
Summary: Heart Failure, SOB, CKD, hypoxia, recent fall (acute hypoxic respiratory failure, acute on chronic diastolic heart failure, moderate to severe left pleural effusion.  DATE & TIME: 08/6/24-8/7/24 [id number]   Cognitive Concerns/ Orientation: A&Ox4, calm, coop, pleasant.  BEHAVIOR & AGGRESSION TOOL COLOR: Green  ABNL VS/O2: BP elevated, MD aware. Stable on RA while awake.   MOBILITY: Not OOB this shift, T/R as pt allows- weight shifts self.  PAIN MANAGMENT: Denies.  DIET: 2 gram Na+/gluten free- tolerating.   BOWEL/BLADDER: Incontinent of B/B. Purewick in place. No BM.   ABNL LAB: Na 133, creat 1.45.  DRAIN/DEVICES: R. IJ PIV SL  TELEMETRY RHYTHM: NSR  SKIN: BUE forearm skin tear dressings change today. R AKA drsg CDI, due to change today. Coccyx meplilex for protection.   TESTS/PROCEDURES: None.  D/C DAY/GOALS/PLACE: Home once BP and kidney function stable 8/6.  OTHER IMPORTANT INFO: Tearful about situation, wants to go home,

## 2024-08-07 NOTE — DISCHARGE INSTRUCTIONS
Right AKA site wound(s): Every other day  Cleanse with Vashe-moist gauze.  Allow gauze to soak on wound for at least 2 minutes, then remove, wiping wound clean.    Swab periwound with skin prep, let dry.  Apply Xeroform to wound bed, cut to fit.  Cover with Mepilex 4x4.  Distal limb small eschar can stay open to air.   Elevate/cushion residual limb with pillow.     Right forearm wounds: Every other day  Remove dressing in direction of the arrow.  Cleanse with Vashe-moist gauze.    Cover with Adaptic and Mepilex 4x4.     Coccyx: apply preventive Sacral Mepilex, change every 3 days and prn.

## 2024-08-07 NOTE — PROGRESS NOTES
[facility]  WOC Nurse Inpatient Assessment     Consulted for:  Right AKA stump, right forearm, coccyx    Summary:    Right AKA: healing wound to anterior distal thigh, no s/s infection.   Bilateral forearms: skin tears from fall off commode at home.  Pt states these were dressed last night in the ED with the instruction to not remove for 24 hrs; pt therefore declined WOC assessment 7/31. Right forearm assessed 8/7 d/t patient request. Skin tear with partially missing skin flap appears to be healing well.  Coccyx: history of full-thickness pressure injury, current intact/healed.     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  Evaluation in the ER was concerning for congestive heart failure     Areas Assessed:      Areas visualized during today's visit: Sacrum/coccyx, Lower extremities , and Upper extremities     Wound location: Right AKA site     Last photo: 8-7-24 anterior thigh      8-5-24 medial/inferior AKA site      Wound due to: Surgical Wound  Wound history/plan of care: prior AKA 4-1-24 r/t PAD with complicated post-op course and subsequent debridement of eschar 5-20-24.  Home care has been dressing wound every other day.   Wound base: red moist tissue, slightly granular, thick dried yellow drainage vs. Dried slough is lifting at the edges but still adherent to wound bed     Palpation of the wound bed: normal      Drainage: small     Description of drainage: serosanguinous     Measurements (length x width x depth, in cm): 6.3 x 1.3 x 0.1 cm      Tunneling: N/A      Undermining: N/A  Periwound skin: Intact and Dry/scaly; medial prior incision site has remaining small dry eschar approx 0.3 x 0.6 cm       Color: pink      Temperature: normal   Odor: none  Pain: mild, tender  Pain interventions prior to dressing change: slow and gentle cares   Treatment goal: Heal , Drainage control, Infection control/prevention, and Protection  STATUS: improving from prior hospitalization  Supplies ordered: gathered, at bedside, supplies stored on unit, discussed with RN, and discussed with patient       Wound location: Bilateral forearms    Last photo: 8-7-24      Wound due to: Skin Tear  Wound history/plan of care: pt states she fell off her commode at home and her wheelchair sliced her arms.  Wounds bandaged in ED evening of 7/30, instructed not to remove for 24 hrs.      Wound base:  30% non-granular tissue and red, moist , 70%  intact skin flap, ecchymotic, fragile     Palpation of the wound bed: normal      Drainage: small     Description of drainage: serosanguinous     Measurements (length x width x depth, in cm): 4.3  x 2.8  x  0.1 cm      Tunneling: N/A     Undermining: N/A  Periwound skin: Intact, Ecchymosis, and 2 stable scabs distal to wound, largest measures ~1.1 x 1 cm      Color: normal and consistent with surrounding tissue      Temperature: normal   Odor: none  Pain: mild and during dressing change  Pain interventions prior to dressing change: patient tolerated well, soaking, and slow and gentle cares   Treatment goal: Heal  and Protection  STATUS: initial assessment  Supplies ordered: gathered, at bedside, supplies stored on unit, discussed with RN, and discussed with patient       Skin location: Coccyx    Last photo: 8-7-24      Due to: Pressure Injury  History/plan of care: prior full-thickness PI as noted in previous admissions, wound has healed and reopened previously but is intact today  Skin: intact pink blanchable tissue and scar tissue  Pain: denies , none  Pain  interventions prior to dressing change: no significant pain present   Treatment goal: Protection  STATUS: healed  Supplies ordered: supplies stored on unit       Treatment Plan:     Right AKA site wound(s): Every other day  Cleanse with Vashe-moist gauze.  Allow gauze to soak on wound for at least 2 minutes, then remove, wiping wound clean.    Swab periwound with skin prep, let dry.  Apply Xeroform to wound bed, cut to fit.  Cover with Mepilex 4x4.  Distal limb small eschar can stay open to air.   Elevate/cushion residual limb with pillow.     Right forearm wounds: Every other day  Remove dressing in direction of the arrow.  Cleanse with Vashe-moist gauze.    Cover with Adaptic and Mepilex 4x4.     Coccyx: apply preventive Sacral Mepilex, change every 3 days and prn, and follow PIP measures.     Pressure Injury Prevention (PIP) Plan:  -If patient is declining pressure injury prevention interventions: Explore reason why and address patient's concerns and Educate on pressure injury risk and prevention intervention(s)  -Mattress: Follow bed algorithm, reassess daily and order specialty mattress, if indicated.  -HOB: Maintain at or below 30 degrees, unless contraindicated  -Repositioning in bed: Every 1-2 hours , Left/right positioning; avoid supine, and Raise foot of bed prior to raising head of bed, to reduce patient sliding down (shear)  -Heels: Keep elevated off mattress and Pillows under calves  -Protective Dressing: Sacral Mepilex for prevention (#910763),  especially for the agitated patient   -Chair positioning: Chair cushion (#[id number])  and Assist patient to reposition hourly   -Moisture Management: Perineal cleansing /protection: Follow Incontinence Protocol, Avoid brief in bed, Clean and dry skin folds with bathing , and Consider InterDry (#397985) between folds  -Under Devices: Inspect skin under all medical devices during skin inspection , Ensure tubes are stabilized without tension, and Ensure patient is  not lying on medical devices or equipment when repositioned      Orders: Written    RECOMMEND PRIMARY TEAM ORDER: None, at this time  Education provided: plan of care  Discussed plan of care with: Patient and Nurse  WOC nurse follow-up plan: weekly  Notify WOC if wound(s) deteriorate.  Nursing to notify the Provider(s) and re-consult the WOC Nurse if new skin concern.    DATA:     Current support surface: Standard  Standard Isoflex gel  Containment of urine/stool: Incontinence Protocol  BMI: Body mass index is 19.66 kg/m .   Active diet order: Orders Placed This Encounter      Combination Diet 2 gm NA Diet      Diet     Output: I/O last 3 completed shifts:  In: 880 [P.O.:880]  Out: 1500 [Urine:1500]     Labs:   Recent Labs   Lab 08/05/24  1126   HGB 8.5*   WBC 5.0     Pressure injury risk assessment:   Sensory Perception: 4-->no impairment  Moisture: 4-->rarely moist  Activity: 2-->chairfast  Mobility: 3-->slightly limited  Nutrition: 3-->adequate  Friction and Shear: 3-->no apparent problem  Cesar Score: 19    Marleen Santiago RN CWCN  Pager no longer is use, please contact through [employer] group: WOC Nurse Tracy

## 2024-08-07 NOTE — DISCHARGE SUMMARY
[facility]  Hospitalist Discharge Summary     Admit Date:  7/30/2024  Discharge Date:     8/7/2024  Discharging Provider: Sofia Cristobal MD    PRIMARY CARE PROVIDER:    Vasquez Benoit     DISCHARGE DIAGNOSES:     Acute hypoxic respiratory failure due to acute on chronic diastolic heart failure in exacerbation: Resolved   Moderate to severe left pleural effusion status thoracentesis  Type II NSTEMI likely due to demand due to heart failure exacerbation  Hypertension  Hyperlipidemia  Coronary artery disease with history of MI in 2019  Peripheral vascular disease with history of 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 2024  CKD stage III   History of COPD not in exacerbation   Likely sleep apnea   GERD   Anemia   History of thrombocytopenia   Tobacco use     BRIEF HISTORY OF PRESENT ILLNESS/HOSPITAL COURSE:   Shirley Hendricks is a 63 year old female with a history of COPD, tobacco use, hypertension, hyperlipidemia, coronary artery disease, peripheral vascular disease, chronic kidney disease stage III, 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  Evaluation in the ER was concerning for congestive heart failure and was admitted on 7/30/2024     Acute hypoxic respiratory failure due to acute on chronic diastolic heart failure in exacerbation: Resolved   Moderate to severe left pleural effusion status thoracentesis  Type II NSTEMI likely due to demand due to heart failure exacerbation  Patient had been recently admitted to the hospital for acute hypoxic respiratory failure and was discharged home on Bumex 1 mg 3 times daily for 14 days. On day of presentation she stated that she had not been on any diuretics for the past 1 week and did not had any fever or cough. Creatinine on admit was 1.05 with GFR of 59 , WBC count normal at 5, BNP of 32,028 and  troponin peaked at 144. Chest x-ray on admit showed moderate left and small right pleural effusion which is worsened from the prior. CT scan of the chest on 7/31 showed similar findings of the pleural effusion and there was interstitial edema. VQ scan on 7/31 was low probability for PE. She was started on IV Lasix and Cardiology was consulted    - TTE on 7/31/2024 showed EF of 60%, normal wall motion changes and no valvular disease    - Continue Torsemide 50mg    - Unclear why this was started rather than Bumex (recommended by Cardiology) by previous hospitalist but she is responding adequately to this medication so will continue for now. Can consider changing in the future   - Continue Spirolactone 12.5mg daily     - Continue Jardiance 10mg daily, Atenolol 25mg daily, Cresotr daily     - Cardiology Consulted and signed off 8/2/24    - Goal weight 110lbs    - Transition to Bumex 2mg daily    - Re-start jardiance 10 mg daily    - Change Coreg to atenolol 25 mg daily (favor once daily medications in this pt with compliance issues)    - Increase Imdur to 60 mg daily    - Consideration for cardioMEMs if volume continues to be an issue as outpt    - Outpatient follow up     Hyponatremia  - Na: 133 > 131 > 133 > 131    - Baseline: Usually WNL but has been low in the past (as low as 130)   - Sodium has stabilized in the 130s. Did discuss with patient that we could monitor her labs for another day but patient wanting to go home. She has a home RN who can take her labs. Plan for home RN to get a BMP on 8/9/24 or 8/12/24 and send results to PCP.      Hypertension  Hyperlipidemia  Coronary artery disease with history of MI in 2019  Peripheral vascular disease with history of 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 2024  - Continue with amlodipine 10 mg, Coreg changed to atenolol 25 mg daily, hydralazine 50 mg 3 times daily, Imdur 60 mg daily, Crestor 10 mg, spironolactone 12.5   - Continue Torsemide 50mg daily      CKD stage III  Baseline  creatinine in the past few months has been fluctuating and patient has required dialysis in the recent few months and creatinine has been fluctuating between 1.8-3's  - Last creatinine on 7/21 was 1.91  - Cr: 1.05 > 1.26 > 1.28 > 1.41 > 1.51 > 1.40   - Continue to monitor      History of COPD not in exacerbation  - Continue with Breo and as needed albuterol nebs available     Likely sleep apnea  - Overnight oximetry did not show any significant desaturations. Will not qualify for overnight oxygen   - Not interested in meeting with sleep medicine at this time      GERD  - Continue with PTA famotidine     Anemia  Baseline hemoglobin has been between 8-9 over the past few months  - Hemoglobin seems to be stable at 8.5     History of thrombocytopenia  Recent platelet count has been fluctuating between 110s to 140 in the past 1 month  - Platelet count normalized      Tobacco use  - Continue with nicotine patch       Clinically Significant Risk Factors              # Hypoalbuminemia: Lowest albumin = 3.3 g/dL at 7/30/2024 12:33 PM, will monitor as appropriate     # Hypertension: Noted on problem list             # Severe Malnutrition: based on nutrition assessment    # Financial/Environmental Concerns: none              TOTAL DISCHARGE TIME:  Sofia IRAHETA MD, personally saw the patient today and spent greater than 30 minutes discharging this patient.    Sofia Cristobal MD  [facility]  Hospitalist Service   Securely message with the Vocera Web Console (learn more here)  Text page via [facility] Paging/Directory        Significant Results and Procedures   N/a    Pending Results   These results will be followed up by hospitalist team   Unresulted Labs Ordered in the Past 30 Days of this Admission       Date and Time Order Name Status Description    6/26/2024 10:43 AM Acid-Fast Bacilli Culture and Stain In process             Code Status   Full Code       Primary Care Physician   Vasquez TERESA  Kaycee    Physical Exam   Temp: 97.9  F (36.6  C) Temp src: Oral BP: (!) 180/73 Pulse: 56   Resp: 20 SpO2: 94 % O2 Device: None (Room air)    Vitals:    08/01/24 0631 08/02/24 0644 08/07/24 0510   Weight: 52.8 kg (116 lb 6.5 oz) 50.4 kg (111 lb 1.8 oz) 47.2 kg (104 lb 0.9 oz)     Vital Signs with Ranges  Temp:  [97.9  F (36.6  C)-98.8  F (37.1  C)] 97.9  F (36.6  C)  Pulse:  [56-66] 56  Resp:  [16-20] 20  BP: (156-180)/(69-81) 180/73  SpO2:  [92 %-94 %] 94 %  I/O last 3 completed shifts:  In: 880 [P.O.:880]  Out: 1500 [Urine:1500]    Constitutional: Awake, alert, cooperative, no apparent distress.    HEENT: PERRL, Normocephalic, without obvious abnormality, atraumatic, oral pharynx with moist mucus membranes  Pulmonary: Clear to auscultation bilaterally, no crackles or wheezing.  Cardiovascular: Regular rate and rhythm, normal S1 and S2  GI: Normal bowel sounds, soft, non-distended, non-tender.  Skin/Integumen: Visualized skin appeared clear.  Neuro: CN II-XII grossly intact.  Psych:  Alert and oriented x 3.   Extremities: No lower extremity edema noted, R BKA noted    Discharge Disposition   Discharged to home  Condition at discharge: Stable    Consultations This Hospital Stay   VASCULAR ACCESS ADULT IP CONSULT  OCCUPATIONAL THERAPY ADULT IP CONSULT  RESPIRATORY CARE IP CONSULT  CARDIOLOGY IP CONSULT  WOUND OSTOMY CONTINENCE NURSE  IP CONSULT  CARE MANAGEMENT / SOCIAL WORK IP CONSULT  PHYSICAL THERAPY ADULT IP CONSULT  SMOKING CESSATION PROGRAM IP CONSULT      Discharge Orders      Medication Therapy Management Referral      Primary Care - Care Coordination Referral      Reason for your hospital stay    You were admitted to the hospital for acte heart failure exacerbation.     Follow-up and recommended labs and tests     Follow up with primary care provider, Vasquez Benoit, within 7-10 days for hospital follow- up.  The following labs/tests are recommended: BMP.     Activity    Your activity upon discharge: activity as  tolerated     Resume Home Care Services    Resume home RN PT and OT with [employer].   Please get a BMP on 8/9/24 or 8/12/24 and send results to PCP     Diet    Follow this diet upon discharge:  2 gm NA Diet      Snacks/Supplements Adult: Expedite Bottle; Between Meals     Discharge Medications   Current Discharge Medication List        START taking these medications    Details   atenolol (TENORMIN) 25 MG tablet Take 1 tablet (25 mg) by mouth daily  Qty: 30 tablet, Refills: 0    Comments: Future refills by PCP Dr. Vasquez Benoit with phone number [phone].  Associated Diagnoses: Essential hypertension      empagliflozin (JARDIANCE) 10 MG TABS tablet Take 1 tablet (10 mg) by mouth daily  Qty: 90 tablet, Refills: 1    Associated Diagnoses: Acute on chronic heart failure with preserved ejection fraction (H)      isosorbide mononitrate (IMDUR) 60 MG 24 hr tablet Take 1 tablet (60 mg) by mouth daily for 30 days  Qty: 30 tablet, Refills: 0    Comments: Future refills by PCP Dr. Vasquez Benoit with phone number [phone].  Associated Diagnoses: Acute on chronic heart failure with preserved ejection fraction (H)      multivitamin w/minerals (THERA-VIT-M) tablet Take 1 tablet by mouth daily  Qty: 30 tablet, Refills: 0    Associated Diagnoses: Wound infection      spironolactone (ALDACTONE) 25 MG tablet Take 0.5 tablets (12.5 mg) by mouth daily for 30 days  Qty: 15 tablet, Refills: 0    Comments: Future refills by PCP Dr. Vasquez Benoit with phone number [phone].  Associated Diagnoses: Acute on chronic heart failure with preserved ejection fraction (H)      torsemide (DEMADEX) 10 MG tablet Take 5 tablets (50 mg) by mouth daily for 30 days  Qty: 150 tablet, Refills: 0    Comments: Future refills by PCP Dr. Vasquez Benoit with phone number [phone].  Associated Diagnoses: Acute on chronic heart failure with preserved ejection fraction (H)           CONTINUE these medications which have CHANGED    Details   hydrALAZINE  (APRESOLINE) 50 MG tablet Take 1 tablet (50 mg) by mouth 3 times daily for 30 days  Qty: 90 tablet, Refills: 0    Comments: Future refills by PCP Dr. Vasquez Benoit with phone number [phone].  Associated Diagnoses: Essential hypertension           CONTINUE these medications which have NOT CHANGED    Details   acetaminophen (TYLENOL) 500 MG tablet Take 1-2 tablets (500-1,000 mg) by mouth every 6 hours as needed for mild pain  Qty: 60 tablet, Refills: 0    Associated Diagnoses: Wound infection      albuterol (PROAIR HFA/PROVENTIL HFA/VENTOLIN HFA) 108 (90 Base) MCG/ACT inhaler Inhale 2 puffs into the lungs every 4 hours as needed for shortness of breath, wheezing or cough  Qty: 18 g, Refills: 0    Comments: Pharmacy may dispense brand covered by insurance (Proair, or proventil or ventolin or generic albuterol inhaler)      amLODIPine (NORVASC) 10 MG tablet Take 1 tablet (10 mg) by mouth daily  Qty: 90 tablet, Refills: 0    Associated Diagnoses: Benign essential hypertension      budesonide-formoterol (SYMBICORT) 160-4.5 MCG/ACT Inhaler Inhale 2 puffs into the lungs two times daily Disp 3 inhalers  Qty: 30.6 g, Refills: 3    Associated Diagnoses: Chronic obstructive pulmonary disease, unspecified COPD type (H)      cetirizine (ZYRTEC) 5 MG tablet Take 1 tablet (5 mg) by mouth daily  Qty: 30 tablet, Refills: 0    Associated Diagnoses: Chronic allergic rhinitis due to animal hair and dander      clopidogrel (PLAVIX) 75 MG tablet Take 1 tablet (75 mg) by mouth daily  Qty: 30 tablet, Refills: 0    Associated Diagnoses: Peripheral vascular disease (H24)      diphenoxylate-atropine (LOMOTIL) 2.5-0.025 MG tablet Take 1 tablet by mouth 4 times daily as needed for diarrhea  Qty: 30 tablet, Refills: 0    Associated Diagnoses: Wound infection      famotidine (PEPCID) 10 MG tablet Take 1 tablet (10 mg) by mouth daily for 30 days  Qty: 30 tablet, Refills: 0    Associated Diagnoses: CKD (chronic kidney disease) stage 4, GFR 15-29  ml/min (H)      gabapentin (NEURONTIN) 100 MG capsule Take 2 capsules (200 mg) by mouth at bedtime  Qty: 180 capsule, Refills: 0    Associated Diagnoses: Wound infection      !! nicotine (NICODERM CQ) 14 MG/24HR 24 hr patch Place 1 patch onto the skin daily  Qty: 30 patch, Refills: 0    Associated Diagnoses: Tobacco use disorder      nitroGLYcerin (NITROSTAT) 0.4 MG sublingual tablet Place 1 tablet (0.4 mg) under the tongue See Admin Instructions for chest pain  Qty: 30 tablet, Refills: 11    Associated Diagnoses: Coronary artery disease involving native coronary artery of native heart without angina pectoris      ondansetron (ZOFRAN ODT) 4 MG ODT tab Take 1 tablet (4 mg) by mouth every 6 hours as needed for nausea or vomiting    Associated Diagnoses: Nausea      polyethylene glycol (MIRALAX) 17 GM/Dose powder Take 17 g by mouth daily  Qty: 510 g, Refills: 0    Associated Diagnoses: Wound infection      rosuvastatin (CRESTOR) 10 MG tablet Take 1 tablet (10 mg) by mouth at bedtime  Qty: 30 tablet, Refills: 0    Associated Diagnoses: Hyperlipidemia LDL goal <70      miconazole (MICATIN) 2 % external powder Apply topically 2 times daily  Qty: 50 g, Refills: 0    Associated Diagnoses: Fecal incontinence alternating with constipation      !! nicotine (NICODERM CQ) 14 MG/24HR 24 hr patch Place 1 patch onto the skin every 24 hours  Qty: 9 patch, Refills: 0    Associated Diagnoses: Cigarette nicotine dependence with nicotine-induced disorder      saccharomyces boulardii (FLORASTOR) 250 MG capsule Take 1 capsule (250 mg) by mouth 2 times daily  Qty: 60 capsule, Refills: 0    Associated Diagnoses: Encounter for management of wound VAC      silver sulfADIAZINE (SILVADENE) 1 % external cream Apply topically daily    Associated Diagnoses: S/P AKA (above knee amputation) unilateral, right (H)      wound support modular (EXPEDITE) LIQD bottle Take 60 mLs by mouth daily  Qty: 2000 mL, Refills: 0    Associated Diagnoses: S/P AKA  (above knee amputation) unilateral, right (H)       !! - Potential duplicate medications found. Please discuss with provider.        STOP taking these medications       bumetanide (BUMEX) 1 MG tablet Comments:   Reason for Stopping:             Allergies   Allergies   Allergen Reactions    Contrast Dye Anaphylaxis     Pt reported facial and throat swelling with prior CT contrast    Pantoprazole      Protonix caused diffuse edema    Chantix [Varenicline]      Terrible dreams    Gluten Meal GI Disturbance     Pt has celiac disease    Penicillins Itching and Rash     Data   Most Recent 3 CBC's:  Recent Labs   Lab Test 08/06/24  0708 08/05/24  1126 08/04/24  1055 08/03/24  0610   WBC  --  5.0 5.7 4.6   HGB  --  8.5* 8.3* 7.9*   MCV  --  96 99 98    177 156 153      Most Recent 3 BMP's:  Recent Labs   Lab Test 08/07/24  0650 08/06/24  0708 08/05/24  1126   * 133* 131*   POTASSIUM 4.1 4.0 4.3   CHLORIDE 95* 95* 90*   CO2 30* 31* 33*   BUN 52.0* 46.2* 44.6*   CR 1.40* 1.45* 1.51*   ANIONGAP 6* 7 8   SONIA 8.7* 8.6* 8.5*   GLC 80 80 86     Most Recent 2 LFT's:  Recent Labs   Lab Test 07/30/24  1233 07/04/24  0621   AST 28 13   ALT 17 15   ALKPHOS 57 51   BILITOTAL 0.3 <0.2     Most Recent INR's and Anticoagulation Dosing History:  Anticoagulation Dose History  More data exists         Latest Ref Rng & Units 5/10/2016 9/24/2020 5/17/2023 10/5/2023 10/6/2023 10/7/2023 4/11/2024   Recent Dosing and Labs   INR 0.85 - 1.15 0.95  1.01  0.94  0.95  1.08  1.38  1.31       Details                 Most Recent 3 Troponin's:  Recent Labs   Lab Test 06/10/20  1243 02/16/20  1824 09/18/19  0739   TROPI <0.015 <0.015 <0.015     Most Recent Cholesterol Panel:  Recent Labs   Lab Test 10/03/23  0941   CHOL 137   LDL 69   HDL 54   TRIG 68     Most Recent 6 Bacteria Isolates From Any Culture (See EPIC Reports for Culture Details):No lab results found.  Most Recent TSH, T4 and A1c Labs:  Recent Labs   Lab Test 06/22/24  0590  06/21/24  0959 02/04/19  0934 03/28/17  0922   TSH 2.07  --    < > 1.37   T4  --   --   --  1.19   A1C  --  4.6   < >  --     < > = values in this interval not displayed.     Results for orders placed or performed during the hospital encounter of 07/30/24   Chest XR,  PA & LAT    Narrative    CHEST TWO VIEWS 7/30/2024 12:47 PM     HISTORY: dyspnea, hypoxia hx COPD    COMPARISON: 7/21/2024       Impression    IMPRESSION: Moderate left and small right pleural effusions are  present, slightly worsened since the prior exam. Stable, enlarged  cardiomediastinal silhouette. Vascular calcifications are seen within  the thoracic aorta. Patchy airspace and interstitial opacities are  seen within both lungs, slightly worsened since the prior exam  suggestive of any right lower lung. Differential diagnosis includes  pulmonary edema versus multifocal pneumonia. Multilevel degenerative  changes are seen in the spine. Partially seen surgical clips within  the left upper arm.    OLIVIA BILLY MD         SYSTEM ID:  [id number]   US Lower Extremity Venous Duplex Bilateral    Narrative    VENOUS ULTRASOUND BILATERAL LEG(S) July 30, 2024 1:27 PM     HISTORY: Rule out deep vein thrombosis. Right lower extremity pain.  Recent fall on blood thinners. History of right above-knee amputation.    COMPARISON: None.    FINDINGS: Examination of the deep veins with graded compression and  color flow Doppler with spectral wave form analysis was performed.     Right: Images show the right common femoral, profunda femoral and  proximal femoral veins are patent without deep vein thrombosis.    Left: Images show no evidence of thrombus in the bilateral common  femoral vein, femoral vein, popliteal vein or calf veins.      Impression    IMPRESSION: No deep vein thrombosis in either lower extremity.      GHASSAN COLLAZO DO         SYSTEM ID:  [id number]   NM Lung Scan Ventilation and Perfusion    Narrative    EXAM: NM LUNG SCAN VENTILATION AND  PERFUSION  LOCATION: [facility]  DATE: 7/31/2024    INDICATION: intermediate suspicion for PE, D dimer positive  COMPARISON: CT chest 07/31/2024.  TECHNIQUE: 52 mCi Tc-99m DTPA inhaled. 5.6 mCi Tc-99m MAA, IV. Standard planar imaging during perfusion and ventilation portions of exam.    FINDINGS: Matched perfusion defect in the left lower lung with ventilation defect larger than the perfusion defect, likely related to pleural effusion. No segmental perfusion defect of the right lung. Heterogeneous ventilation of the right lung. No   mismatched segmental perfusion defect.      Impression    IMPRESSION:    Low probability for pulmonary embolism.   CT Chest w/o Contrast    Narrative    CT CHEST WITHOUT CONTRAST 7/31/2024 9:07 AM    CLINICAL HISTORY: Pneumonia.    TECHNIQUE: CT chest without IV contrast. Multiplanar reformats were  obtained. Dose reduction techniques were used.    CONTRAST: None.    COMPARISON: CT chest 6/24/2024. Chest radiograph 7/30/2024    FINDINGS:   LUNGS AND PLEURA: Moderate to large left and small to moderate right  pleural effusions with adjacent atelectasis. There is complete  collapse of the left lower lobe. Interstitial pulmonary edema. No  pneumothorax. Trace debris within the upper trachea.    MEDIASTINUM/AXILLAE: Similar thyroid nodules. No lymphadenopathy.  Aortic valve calcification. No thoracic aortic aneurysm.    CORONARY ARTERY CALCIFICATION: Severe.    UPPER ABDOMEN: Mild postcholecystectomy reservoir effect. Trace  perisplenic ascites.    MUSCULOSKELETAL: No aggressive osseous lesion. Degenerative changes of  the spine.      Impression    IMPRESSION:   1.  Moderate to large left and small to moderate right pleural  effusions with adjacent compressive atelectasis with complete collapse  of the left lower lobe.  2.  Interstitial pulmonary edema.    SILVIO COOK MD         SYSTEM ID:  [id number]   US Thoracentesis    Narrative    ULTRASOUND GUIDED  THORACENTESIS  2024 3:59 PM     HISTORY: Left-sided pleural effusion    FINDINGS: Ultrasound was used to evaluate for the presence and best  approach for drainage of a pleural effusion. Written and oral informed  consent was obtained. A pause for the cause procedure to verify the  correct patient and correct procedure. The skin overlying the left  chest posteriorly was prepped and draped in the usual sterile fashion.  The subcutaneous tissues were anesthetized with 10 mL 1% lidocaine. A  catheter was advanced into the pleural space and 1450 mL of  yellow  colored fluid was drained. Ultrasound images were permanently stored.   There were no immediate complications. Patient left the ultrasound  suite in satisfactory condition.      Impression    IMPRESSION: Technically successful thoracentesis without immediate  complications.    SILVIO COOK MD         SYSTEM ID:  [id number]   Echocardiogram Complete     Value    LVEF  60%    Narrative    [id number]  [id number]  [id number]  [id number]^[id number]^MEE^BRYAN     [facility]  Echocardiography Laboratory  [address] [place] [address]     Name: KASIA WAN  MRN: [id number]  : 1960  Study Date: 2024 01:21 PM  Age: 63 yrs  Gender: Female  Patient Location: [facility]  Reason For Study: Heart Failure  Ordering Physician: MEE SHARIF  Referring Physician: Vasquez Benoit  Performed By: Jacqueline Hare RDCS     BSA: 1.5 m2  Height: 61 in  Weight: 117 lb  HR: 53  BP: 141/81 mmHg  ______________________________________________________________________________  Procedure  Complete Portable Echo Adult.  ______________________________________________________________________________  Interpretation Summary     1. The left ventricle is normal in structure, function and size. The visual  ejection fraction is estimated at 60%. Normal wall motion.  2. The right ventricle is normal in structure, function and size.  3. No valve  disease.     Echo 24 showed EF 60%, mild mid anterior hypokinesis.  ______________________________________________________________________________  Left Ventricle  The left ventricle is normal in structure, function and size. There is normal  left ventricular wall thickness. The visual ejection fraction is estimated at  60%. Left ventricular diastolic function is indeterminate. Normal left  ventricular wall motion.     Right Ventricle  The right ventricle is normal in structure, function and size.     Atria  The left atrium is moderately dilated. Right atrial size is normal. There is  no atrial shunt seen.     Mitral Valve  There is no mitral regurgitation noted.     Tricuspid Valve  There is trace tricuspid regurgitation.     Aortic Valve  The aortic valve is normal in structure and function.     Pulmonic Valve  The pulmonic valve is normal in structure and function.     Vessels  Normal ascending, transverse (arch), and descending aorta. Normal IVC size  with reduced collapse.     Pericardium  There is no pericardial effusion.     Rhythm  Sinus rhythm was noted.  ______________________________________________________________________________  MMode/2D Measurements & Calculations  IVSd: 1.0 cm     LVIDd: 4.5 cm  LVIDs: 2.9 cm  LVPWd: 1.0 cm  FS: 35.6 %  LV mass(C)d: 153.3 grams  LV mass(C)dI: 101.9 grams/m2  Ao root diam: 3.0 cm  LA dimension: 3.8 cm  LA/Ao: 1.3  Ao root diam index Ht(cm/m): 1.9  Ao root diam index BSA (cm/m2): 2.0  LA Volume (BP): 72.6 ml  LA Volume Index (BP): 48.4 ml/m2  RWT: 0.44     TAPSE: 2.8 cm     Doppler Measurements & Calculations  MV E max perfecto: 124.0 cm/sec  MV A max perfecto: 135.0 cm/sec  MV E/A: 0.92  MV dec time: 0.20 sec  Ao V2 max: 150.0 cm/sec  Ao max P.0 mmHg  Ao V2 mean: 103.0 cm/sec  Ao mean P.0 mmHg  Ao V2 VTI: 36.8 cm  LV V1 max P.4 mmHg  LV V1 max: 116.0 cm/sec  LV V1 VTI: 26.0 cm  PA acc time: 0.12 sec  TR max perfecto: 299.3 cm/sec  TR max P.0 mmHg  AV Perfecto Ratio  (DI): 0.77  E/E' av.5  Lateral E/e': 12.5  Medial E/e': 14.4  RV S Perfecto: 14.3 cm/sec     ______________________________________________________________________________  Report approved by: Cheryl Chen 2024 02:58 PM           *Note: Due to a large number of results and/or encounters for the requested time period, some results have not been displayed. A complete set of results can be found in Results Review.

## 2024-08-07 NOTE — PLAN OF CARE
Goal Outcome Evaluation:  Summary: Heart Failure, SOB, CKD, hypoxia, recent fall (acute hypoxic respiratory failure, acute on chronic diastolic heart failure, moderate to severe left pleural effusion.  DATE & TIME: 08/6/24 4876-4621   Cognitive Concerns/ Orientation: A&Ox4, calm, coop, pleasant.  BEHAVIOR & AGGRESSION TOOL COLOR: Green  ABNL VS/O2: BP elevated, MD aware. Stable on RA while awake.   MOBILITY: Not OOB this shift, T/R as pt allows- weight shifts self.  PAIN MANAGMENT: Denies.  DIET: 2 gram Na+/gluten free- tolerating.   BOWEL/BLADDER: Incontinent of B/B. Purewick in place. No BM.   ABNL LAB: Na 133, creat 1.45.  DRAIN/DEVICES: R. IJ PIV SL  TELEMETRY RHYTHM: SR  SKIN: BUE forearm skin tear dressings change today. R AKA drsg CDI, due to change tomorrow. Coccyx meplilex for protection.   TESTS/PROCEDURES: None.  D/C DAY/GOALS/PLACE: Home once BP and kidney function stable 8/6.  OTHER IMPORTANT INFO: Tearful about situation, wants to go home, comfort provided by MD and RN. Resting or watching tv between cares.

## 2024-08-07 NOTE — TELEPHONE ENCOUNTER
Upon chart review, patient is still admitted to the hospital.     Will postpone encounter and follow-up with the patient tomorrow.     Mary Murillo RN

## 2024-08-07 NOTE — PROGRESS NOTES
"Care Management Discharge Note    Discharge Date: 08/07/2024       Discharge Disposition: Home, Home Care    Discharge Services: Resume Home Care    Discharge DME: None    Discharge Transportation: family or friend will provide    Private pay costs discussed: Not applicable    Does the patient's insurance plan have a 3 day qualifying hospital stay waiver?  Yes     Which insurance plan 3 day waiver is available? Alternative insurance waiver    Will the waiver be used for post-acute placement? No    PAS Confirmation Code:    Patient/family educated on Medicare website which has current facility and service quality ratings: no    Education Provided on the Discharge Plan: Yes  Persons Notified of Discharge Plans: bedside nurse, home care  Patient/Family in Agreement with the Plan: yes    Handoff Referral Completed: Yes    Additional Information:  Patient discharging today.    Has a \"resume home care\" order with request for lab draw.  [facility] notified of discharge today and lab draw per order on S.  They are aware and will pull orders from Epic.  She has current PT, OT, SN, SW services.    Patient will schedule her post hospital appointment.    Kathie Bates RN  Inpatient Float Care Coordinator  [facility]            "

## 2024-08-07 NOTE — PROGRESS NOTES
CLINICAL NUTRITION SERVICES - REASSESSMENT NOTE    Malnutrition: (7/31)  % Weight Loss:  GERALD w/ fluid status  % Intake:  No decreased intake noted per pt  Subcutaneous Fat Loss:  Orbital region mild depletion and Upper arm region moderate-severe depletion  Muscle Loss:  Temporal region moderate-severe depletion and Clavicle bone region mild-moderate depletion, upper arm severe depletion  Fluid Retention:  2-3+ mild-moderate     Malnutrition Diagnosis: Severe malnutrition  In Context of:  Acute illness or injury  Chronic illness or disease     EVALUATION OF PROGRESS TOWARD GOALS   Diet:  2g NA diet  Supplements: Expedite bottle w/ ice     Intake/Tolerance:    - 100% intakes documented per nursing flowsheets  - Ordering TID meals per healthtouch  - Good diet/feeding tolerance per flowsheets  - Spoke with patient at bedside. Per patient, her appetite has been good. She said she is eating fruit for breakfast here and switching between a hamburger and chicken for lunch and dinner. She is getting burnt out with these items and a bit frustrated with the lack of gluten free options here. She said she is looking forward to going home and eating the foods she knows she likes/can tolerate. She said she is going home today. She is consuming the Expedite as long as she has a cup of ice.     ASSESSED NUTRITION NEEDS:  Dosing Weight 53.3 kg  Estimated Energy Needs: 4842-9230 kcals (25-30 Kcal/Kg)  Justification: maintenance  Estimated Protein Needs: 64-80 grams protein (1.2-1.5 g pro/Kg)  Justification: wound healing  Estimated Fluid Needs: 0854-4837 mL (1 mL/Kcal)  Justification: maintenance or per MD    Previous Goals:   Patient to consume >75% of TID meals + 1 bottle Expedite   Evaluation: Met    Previous Nutrition Diagnosis:   Increased nutrient needs (protein) related to wound healing as evidenced by surgical wound, previous reopened pressure injury (intact today), need for up to 80g protein per day for healing   Evaluation:  No change    CURRENT NUTRITION DIAGNOSIS  Increased nutrient needs (protein) related to wound healing as evidenced by surgical wound, previous reopened pressure injury (intact today), need for up to 80g protein per day for healing     INTERVENTIONS  Recommendations / Nutrition Prescription  See above    Implementation  Medical Food Supplement - continue    Goals  Patient to consume >75% of TID meals + 1 bottle Expedite     MONITORING AND EVALUATION:  Progress towards goals will be monitored and evaluated per protocol and Practice Guidelines    Vangie Dukes RD, LD  Clinical Dietitian - [facility]

## 2024-08-07 NOTE — PLAN OF CARE
Goal Outcome Evaluation:      Plan of Care Reviewed With: patient    Overall Patient Progress: improvingOverall Patient Progress: improving         Summary: Heart Failure, SOB, CKD, hypoxia, recent fall (acute hypoxic respiratory failure, acute on chronic diastolic heart failure, moderate to severe left pleural effusion.    DATE & TIME: 08/7/24 Day   Cognitive Concerns/ Orientation: A&Ox4, calm, coop, pleasant.  BEHAVIOR & AGGRESSION TOOL COLOR: Green  ABNL VS/O2: VSS x elevated BP and HR 50's, MD aware. On RA.   MOBILITY: Not OOB this shift, T/R as pt allows- weight shifts self.  PAIN MANAGMENT: Denies.  DIET: 2 gram Na+/gluten free- tolerating.   BOWEL/BLADDER: Purewick in place. No BM.   ABNL LAB: Na 131, creat 1.40.  DRAIN/DEVICES: R IJ PIV removed  TELEMETRY RHYTHM: Sinus christel.  SKIN: BUE forearm skin tear,R AKA,Coccyx drsgs changed today per WOCN.  TESTS/PROCEDURES: None.  D/C DAY/GOALS/PLACE: Home with home care, RN CC involved.  OTHER IMPORTANT INFO: Happy about discharging today.     Discharge    Patient discharged to home with son.    Listed belongings gathered and given to patient (including from security/pharmacy). Yes  Care Plan and Patient education resolved: Yes  Prescriptions if needed, hard copies sent with patient  NA  Medication Bin checked and emptied on discharge Yes  SW/care coordinator/charge RN aware of discharge: Yes

## 2024-08-08 ENCOUNTER — TELEPHONE (OUTPATIENT)
Dept: INTERNAL MEDICINE | Facility: CLINIC | Age: 64
End: 2024-08-08
Payer: COMMERCIAL

## 2024-08-08 ENCOUNTER — PATIENT OUTREACH (OUTPATIENT)
Dept: CARE COORDINATION | Facility: CLINIC | Age: 64
End: 2024-08-08
Payer: COMMERCIAL

## 2024-08-08 NOTE — TELEPHONE ENCOUNTER
Verbal approval for RN home care orders outlined below given to Amaya ANN with [employer]. Isha Owens RN

## 2024-08-08 NOTE — PROGRESS NOTES
Clinic Care Coordination Contact  Transitions of Care Outreach  Chief Complaint   Patient presents with    Clinic Care Coordination - Post Hospital       Most Recent Admission Date: 7/30/2024   Most Recent Admission Diagnosis: Shortness of breath - R06.02  Hypoxia - R09.02  Chronic kidney disease, unspecified CKD stage - N18.9     Most Recent Discharge Date: 8/7/2024   Most Recent Discharge Diagnosis: Shortness of breath - R06.02  Hypoxia - R09.02  Chronic kidney disease, unspecified CKD stage - N18.9  Pleural effusion - J90  Acute on chronic heart failure with preserved ejection fraction (H) - I50.33  Essential hypertension - I10  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  How are your symptoms? (Red Flag symptoms escalate to triage hotline per guidelines): Unchanged  Do you know how to contact your clinic care team if you have future questions or changes to your health status? : Yes  Does the patient have their discharge instructions? : Yes  Does the patient have questions regarding their discharge instructions? : No  Were you started on any new medications or were there changes to any of your previous medications? : Yes  Does the patient have all of their medications?: Yes  Do you have questions regarding any of your medications? : No         Post-op (Clinicians Only)  Did the patient have surgery or a procedure: No  Fever: No  Chills: No  Eating & Drinking: eating and drinking without complaints/concerns  PO Intake:  (low salt, gluten free)  Additional Symptoms:  (Denies)  Bowel Function: normal  Date of last BM: 08/08/24  Urinary Status: voiding without complaint/concerns    Care Management   None    Follow up Plan     Care Coordination services not needed at this time per patient.   Declined to review medication list. Did update her pill box. The home care RN  will also be in to review.     Discharge Follow-Up  Discharge follow up appointment scheduled in alignment with recommended follow up timeframe or Transitions of Risk Category? (Low = within 30 days; Moderate= within 14 days; High= within 7 days): No  Patient's follow up appointment not scheduled: Patient accepted scheduling support. Appt scheduled/requested per protocol.    Future Appointments   Date Time Provider Department Center   9/12/2024 10:30 AM Jacobo Robbins MD [facility]   9/23/2024 12:40 PM Aleja Floyd APRN CNP [facility]   10/11/2024 12:40 PM Aleja Floyd APRN CNP [facility]       Outpatient Plan as outlined on AVS reviewed with patient.    For any urgent concerns, please contact our 24 hour nurse triage line: [phone] ([phone])       Jaqueline Lynch RN

## 2024-08-08 NOTE — TELEPHONE ENCOUNTER
MTM referral from: Transitions of Care (recent hospital discharge or ED visit)    MTM referral outreach attempt #1 on August 8, 2024 at 10:52 AM      Outcome: Spoke with patient declined MTM    Use MARIAELENA Skelton medicare part D Map, RAFA for the carrier/Plan on the flowsheet          Silvana Hunter [facility]  -[facility]  [phone]

## 2024-08-08 NOTE — TELEPHONE ENCOUNTER
Accidentally closed encounter before routing. Routing addendum to triage to  inform home care re: order approval

## 2024-08-08 NOTE — TELEPHONE ENCOUNTER
Called and spoke with pt to assist with scheduling as she has now discharged.  Per chart review, CC has already completed hospital follow-up questions.     Aug 12, 2024 9:00 AM  (Arrive by 8:40 AM)  Provider Visit with Jaqueline Aguilar PA-C  [facility] ([facility] ) [phone]     Thank you,  Nehal Rodgers RN

## 2024-08-08 NOTE — TELEPHONE ENCOUNTER
Home Care is calling regarding an established patient with [facility].       Requesting orders from: Vasquez Benoit  Provider is following patient: Yes  Is this a 60-day recertification request?  No    Orders Requested    Skilled Nursing  Request for resumption in care.     3w8 and 3 PRN visits for wound care/assessment    - BMP lab order for tomorrow's SN visit (per discharge orders)    Physical Therapy  Request for initial evaluation and treatment (one time)     Occupational Therapy  Request for initial evaluation and treatment (one time)     Social Work  Request for initial evaluation and treatment (one time)     Information was gathered and will be sent to provider for review.  RN will contact Home Care with information after provider review.  Confirmed ok to leave a detailed message with call back.  Contact information confirmed and updated as needed.    Jadyn Rivera RN

## 2024-08-09 ENCOUNTER — TELEPHONE (OUTPATIENT)
Dept: CARDIOLOGY | Facility: CLINIC | Age: 64
End: 2024-08-09
Payer: COMMERCIAL

## 2024-08-09 ENCOUNTER — LAB REQUISITION (OUTPATIENT)
Dept: LAB | Facility: CLINIC | Age: 64
End: 2024-08-09
Payer: COMMERCIAL

## 2024-08-09 DIAGNOSIS — N17.9 ACUTE KIDNEY FAILURE, UNSPECIFIED (H): ICD-10-CM

## 2024-08-09 DIAGNOSIS — E78.5 HYPERLIPIDEMIA LDL GOAL <70: ICD-10-CM

## 2024-08-09 LAB
ANION GAP SERPL CALCULATED.3IONS-SCNC: 12 MMOL/L (ref 7–15)
BUN SERPL-MCNC: 53.6 MG/DL (ref 8–23)
CALCIUM SERPL-MCNC: 8.7 MG/DL (ref 8.8–10.4)
CHLORIDE SERPL-SCNC: 92 MMOL/L (ref 98–107)
CREAT SERPL-MCNC: 1.64 MG/DL (ref 0.51–0.95)
EGFRCR SERPLBLD CKD-EPI 2021: 34 ML/MIN/1.73M2
GLUCOSE SERPL-MCNC: 83 MG/DL (ref 70–99)
HCO3 SERPL-SCNC: 27 MMOL/L (ref 22–29)
POTASSIUM SERPL-SCNC: 4.2 MMOL/L (ref 3.4–5.3)
SODIUM SERPL-SCNC: 131 MMOL/L (ref 135–145)

## 2024-08-09 PROCEDURE — 80048 BASIC METABOLIC PNL TOTAL CA: CPT | Mod: ORL | Performed by: INTERNAL MEDICINE

## 2024-08-09 RX ORDER — ROSUVASTATIN CALCIUM 10 MG/1
10 TABLET, COATED ORAL AT BEDTIME
Qty: 30 TABLET | Refills: 1 | Status: SHIPPED | OUTPATIENT
Start: 2024-08-09 | End: 2024-08-15

## 2024-08-09 NOTE — TELEPHONE ENCOUNTER
Patient was admitted to [facility] on 7/30/24 with acute on chronic HFpEF with a 7 lbs wt gain after she stopped taking her Bumex 3 mg daily ten days PTA. Multiple recent admissions for volume overload as well, requiring short term HD in May. Acute on chronic HFpEF in the setting of multiple compliance issues.    PMH: coronary disease (2023 angio showing  of an diagonal vessel with good collaterals and moderate disease elsewhere), chronic anemia with hx of B-cell lymphoma, COPD, chronic smoker, severe peripheral vascular disease s/p BKA, hx short term HD.     7/31/24: Echo showed EF of 60%, normal wall motion changes and no valvular disease.    7/31/24: Pleural effusion s/p L thoracentesis with 1.5 L removed.    IV Lasix diuresed.    Pt was started on Imdur, Atenolol, Jardiance, Aldactone and Torsemide. PTA Hydralazine dosage was increased and Bumex was discontinued at time of discharge.    Called patient to discuss any post hospital d/c questions she may have, review medication changes, and confirm f/u appts. Patient denied any questions regarding new medications or changes to PTA medications.     Patient denied any SOB, chest pain, edema or light headedness.    RN confirmed with patient that she is scheduled for an OV on 9/23/24 at 1230 with BALJINDER Aleja Floyd at our [place] Office. Dr. Mason's team RN phone number provided.    States she has been watching her salt. Unable to measure daily wts at this time. Pt is a BKA.    Pt states she has a [facility] RN every other day.    Patient advised to call clinic with any cardiac related questions or concerns prior to this baljinder't. Patient verbalized understanding and agreed with plan. YULIA Robins RN.

## 2024-08-12 ENCOUNTER — TELEPHONE (OUTPATIENT)
Dept: INTERNAL MEDICINE | Facility: CLINIC | Age: 64
End: 2024-08-12

## 2024-08-12 ENCOUNTER — MEDICAL CORRESPONDENCE (OUTPATIENT)
Dept: HEALTH INFORMATION MANAGEMENT | Facility: CLINIC | Age: 64
End: 2024-08-12

## 2024-08-12 NOTE — TELEPHONE ENCOUNTER
Home Care is calling regarding an established patient with [facility].       Requesting orders from: Vasquez Benoit  Provider is following patient: Yes  Is this a 60-day recertification request?  No    Orders Requested    Occupational Therapy  Request for initial certification (first set of orders)   Frequency:  1x/wk for 4 wks      Information was gathered and will be sent to provider for review.  RN will contact Home Care with information after provider review.  Confirmed ok to leave a detailed message with call back.  Contact information confirmed and updated as needed.    Vikki Ramirez RN

## 2024-08-12 NOTE — TELEPHONE ENCOUNTER
Called and spoke with Eva to relay provider approval of requested home care orders.   She verbalized understanding.    Thank you,  Nehal Rodgers RN

## 2024-08-14 ENCOUNTER — MEDICAL CORRESPONDENCE (OUTPATIENT)
Dept: HEALTH INFORMATION MANAGEMENT | Facility: CLINIC | Age: 64
End: 2024-08-14

## 2024-08-14 ENCOUNTER — TELEPHONE (OUTPATIENT)
Dept: INTERNAL MEDICINE | Facility: CLINIC | Age: 64
End: 2024-08-14
Payer: COMMERCIAL

## 2024-08-14 DIAGNOSIS — Z53.9 DIAGNOSIS NOT YET DEFINED: Primary | ICD-10-CM

## 2024-08-14 PROCEDURE — G0179 MD RECERTIFICATION HHA PT: HCPCS | Performed by: INTERNAL MEDICINE

## 2024-08-14 NOTE — TELEPHONE ENCOUNTER
Home Care is calling regarding an established patient with [facility].    Requesting orders from: Vasquez Benoit  Provider is following patient: Yes  Is this a 60-day recertification request?  No    Orders Requested    Physical Therapy to work on strength and pre ambulation activities.   Request for first set of orders  Frequency:  2x/wk for 5 wks  then 1x/wk for 2 wks      Confirmed ok to leave a detailed message with call back.  Contact information confirmed and updated as needed.    Ron Garcia RN

## 2024-08-15 ENCOUNTER — TELEPHONE (OUTPATIENT)
Dept: INTERNAL MEDICINE | Facility: CLINIC | Age: 64
End: 2024-08-15

## 2024-08-15 ENCOUNTER — OFFICE VISIT (OUTPATIENT)
Dept: INTERNAL MEDICINE | Facility: CLINIC | Age: 64
End: 2024-08-15
Payer: COMMERCIAL

## 2024-08-15 VITALS
WEIGHT: 104 LBS | OXYGEN SATURATION: 97 % | SYSTOLIC BLOOD PRESSURE: 158 MMHG | BODY MASS INDEX: 19.63 KG/M2 | DIASTOLIC BLOOD PRESSURE: 64 MMHG | HEIGHT: 61 IN | TEMPERATURE: 97.8 F | HEART RATE: 56 BPM

## 2024-08-15 DIAGNOSIS — N18.9 CHRONIC KIDNEY DISEASE, UNSPECIFIED CKD STAGE: Primary | ICD-10-CM

## 2024-08-15 DIAGNOSIS — I50.30 HEART FAILURE WITH PRESERVED EJECTION FRACTION, NYHA CLASS II (H): ICD-10-CM

## 2024-08-15 DIAGNOSIS — E78.5 HYPERLIPIDEMIA LDL GOAL <70: ICD-10-CM

## 2024-08-15 PROCEDURE — 99214 OFFICE O/P EST MOD 30 MIN: CPT | Performed by: INTERNAL MEDICINE

## 2024-08-15 RX ORDER — ROSUVASTATIN CALCIUM 10 MG/1
10 TABLET, COATED ORAL AT BEDTIME
Qty: 30 TABLET | Refills: 6 | Status: SHIPPED | OUTPATIENT
Start: 2024-08-15

## 2024-08-15 NOTE — TELEPHONE ENCOUNTER
Patient unable to stay for lab work after her visit today due to transportation. Home care RN can draw labs tomorrow at home visit. Chart reviewed. Notified RN that future lab order is in place for BMP.     OK for home care RN to draw lab at home visit tomorrow?        Sera Hoffman, RN

## 2024-08-15 NOTE — TELEPHONE ENCOUNTER
OK for HC RN to draw  BMP as ordered by other provider today when pt seen by HC RN tomorrow morning as requested

## 2024-08-15 NOTE — TELEPHONE ENCOUNTER
Home Care Contact    Attempt # 2    Was call answered?  No.  Unable to leave message. VM recording plays and then does not record a VM.    Thank you,  Nehal Rodgers RN

## 2024-08-15 NOTE — PROGRESS NOTES
Assessment & Plan     Chronic kidney disease, unspecified CKD stage  Will recheck labs today. Diureteics were adjusted during hospitalization    - Basic metabolic panel  (Ca, Cl, CO2, Creat, Gluc, K, Na, BUN); Future    Hyperlipidemia LDL goal <70  Needs refill of crestor today.    - rosuvastatin (CRESTOR) 10 MG tablet; Take 1 tablet (10 mg) by mouth at bedtime    HFpEF: discharged from recent hospital stay. Doing ok with improved edema. Breathing back to her baseline.     MED REC REQUIRED  Post Medication Reconciliation Status:  Discharge medications reconciled, continue medications without change        Subjective   Shirley is a 63 year old, presenting for the following health issues:  Hospital F/U  Pt has not taken medication yet this morning      Tried weighing herself yesterday. Had difficulty with this, but weight said 88 lb which is not accurate. Swelling is improved since discharge, good appetite. Needs crestor, taking all other medications as prescribed.       History of Present Illness       Reason for visit:  Follow up after hospital stay    She eats 2-3 servings of fruits and vegetables daily.She consumes 2 sweetened beverage(s) daily.She exercises with enough effort to increase her heart rate 9 or less minutes per day.  She exercises with enough effort to increase her heart rate 3 or less days per week.   She is taking medications regularly.          Hospital Follow-up Visit:    Hospital/Nursing Home/IP Rehab Facility: [facility]  Date of Admission: 7/30/24  Date of Discharge: 8/7/24  Reason(s) for Admission: acute heart failure exacerbation  Was the patient in the ICU or did the patient experience delirium during hospitalization?  No  Do you have any other stressors you would like to discuss with your provider? No    Problems taking medications regularly:  None  Medication changes since discharge: None  Problems adhering to non-medication therapy:  None    Summary of  hospitalization:  [facility] discharge summary reviewed  Diagnostic Tests/Treatments reviewed.  Follow up needed: none  Other Healthcare Providers Involved in Patient s Care:         None  Update since discharge: improved.         Plan of care communicated with patient                     Objective    LMP  (LMP Unknown)   There is no height or weight on file to calculate BMI.  Physical Exam   GENERAL: alert and no distress  RESP: lungs clear to auscultation - no rales, rhonchi or wheezes  CV: regular rate and rhythm, normal S1 S2, no S3 or S4, no murmur, click or rub, mild edema of hte left lower leg            Signed Electronically by: Rose Dias MD

## 2024-08-16 ENCOUNTER — LAB REQUISITION (OUTPATIENT)
Dept: LAB | Facility: CLINIC | Age: 64
End: 2024-08-16
Payer: COMMERCIAL

## 2024-08-16 DIAGNOSIS — N18.30 CHRONIC KIDNEY DISEASE, STAGE 3 UNSPECIFIED (H): ICD-10-CM

## 2024-08-16 LAB
ANION GAP SERPL CALCULATED.3IONS-SCNC: 13 MMOL/L (ref 7–15)
BUN SERPL-MCNC: 58.3 MG/DL (ref 8–23)
CALCIUM SERPL-MCNC: 9 MG/DL (ref 8.8–10.4)
CHLORIDE SERPL-SCNC: 97 MMOL/L (ref 98–107)
CREAT SERPL-MCNC: 1.6 MG/DL (ref 0.51–0.95)
EGFRCR SERPLBLD CKD-EPI 2021: 35 ML/MIN/1.73M2
GLUCOSE SERPL-MCNC: 56 MG/DL (ref 70–99)
HCO3 SERPL-SCNC: 24 MMOL/L (ref 22–29)
POTASSIUM SERPL-SCNC: 4.3 MMOL/L (ref 3.4–5.3)
SODIUM SERPL-SCNC: 134 MMOL/L (ref 135–145)

## 2024-08-16 PROCEDURE — 80048 BASIC METABOLIC PNL TOTAL CA: CPT | Mod: ORL | Performed by: INTERNAL MEDICINE

## 2024-08-16 NOTE — TELEPHONE ENCOUNTER
(Contacted PT Cindy and updated secured contact number)    Left VM per PT's request, with details and verbal authorization from Dr. Benoit below.    Ashley Rodrigues RN

## 2024-08-19 ENCOUNTER — MEDICAL CORRESPONDENCE (OUTPATIENT)
Dept: HEALTH INFORMATION MANAGEMENT | Facility: CLINIC | Age: 64
End: 2024-08-19
Payer: COMMERCIAL

## 2024-08-21 ENCOUNTER — TELEPHONE (OUTPATIENT)
Dept: INTERNAL MEDICINE | Facility: CLINIC | Age: 64
End: 2024-08-21
Payer: COMMERCIAL

## 2024-08-21 LAB
ACID FAST STAIN (ARUP): NORMAL

## 2024-08-21 NOTE — TELEPHONE ENCOUNTER
Home Care is calling regarding an established patient with [facility].       Requesting orders from: Vasquez Benoit  Provider is following patient: Yes  Is this a 60-day recertification request?  No    Orders Requested    Skilled Nursing  Request for continuation of care with decrease in frequency   Goals have been met/progressing.  Frequency:  2x/wk for 4 wks    Wound care    Verbal orders given.  Home Care will send orders for provider to sign.  Confirmed ok to leave a detailed message with call back.  Contact information confirmed and updated as needed.    Nehal Rodgers, RN

## 2024-08-23 ENCOUNTER — MEDICAL CORRESPONDENCE (OUTPATIENT)
Dept: HEALTH INFORMATION MANAGEMENT | Facility: CLINIC | Age: 64
End: 2024-08-23
Payer: COMMERCIAL

## 2024-08-29 DIAGNOSIS — I10 ESSENTIAL HYPERTENSION: ICD-10-CM

## 2024-08-29 DIAGNOSIS — I73.9 PERIPHERAL VASCULAR DISEASE (H): ICD-10-CM

## 2024-08-29 DIAGNOSIS — I50.33 ACUTE ON CHRONIC HEART FAILURE WITH PRESERVED EJECTION FRACTION (H): ICD-10-CM

## 2024-08-29 DIAGNOSIS — T14.8XXA WOUND INFECTION: ICD-10-CM

## 2024-08-29 DIAGNOSIS — L08.9 WOUND INFECTION: ICD-10-CM

## 2024-08-29 RX ORDER — MULTIPLE VITAMINS W/ MINERALS TAB 9MG-400MCG
1 TAB ORAL DAILY
Qty: 90 TABLET | Refills: 3 | Status: SHIPPED | OUTPATIENT
Start: 2024-08-29

## 2024-08-29 RX ORDER — SPIRONOLACTONE 25 MG/1
12.5 TABLET ORAL DAILY
Qty: 45 TABLET | Refills: 1 | Status: SHIPPED | OUTPATIENT
Start: 2024-08-29

## 2024-08-29 RX ORDER — ISOSORBIDE MONONITRATE 60 MG/1
60 TABLET, EXTENDED RELEASE ORAL DAILY
Qty: 90 TABLET | Refills: 3 | Status: SHIPPED | OUTPATIENT
Start: 2024-08-29

## 2024-08-29 RX ORDER — TORSEMIDE 10 MG/1
50 TABLET ORAL DAILY
Qty: 150 TABLET | Refills: 0 | Status: CANCELLED | OUTPATIENT
Start: 2024-08-29

## 2024-08-29 RX ORDER — CLOPIDOGREL BISULFATE 75 MG/1
75 TABLET ORAL DAILY
Qty: 90 TABLET | Refills: 3 | Status: SHIPPED | OUTPATIENT
Start: 2024-08-29

## 2024-08-29 RX ORDER — ATENOLOL 25 MG/1
25 TABLET ORAL DAILY
Qty: 90 TABLET | Refills: 1 | Status: SHIPPED | OUTPATIENT
Start: 2024-08-29

## 2024-08-29 NOTE — TELEPHONE ENCOUNTER
Pt called back, writer relayed message below. verbalized understanding.     NATHAN NIELSON RN on 8/29/2024 at 3:57 PM

## 2024-08-29 NOTE — TELEPHONE ENCOUNTER
Sunil, Pharmacist with [facility], reports patient's son brought a bag of medication bottles to pharmacy and said 'She needs these medications refilled, not sure what she takes, figure it out'.   All medications were 1 month supply from Hospital discharge. Pharmacist is unsure what medications patient is actually taking.    Per chart review, patient had Hosp F/U appt 8/15, visit is unsigned and no notes regarding medication changes can be viewed.       Writer called patient to clarify. Per patient, medications were not changed at recent OV and she was told to continue all her medications as ordered. Patient also needing refill of Plavix 75 mg tablet.     All medication refills pended for 3 month supply, routing to PCP to review.       Per 8/7/24 Discharge Summary:       START taking these medications     atenolol (TENORMIN) 25 MG tablet Take 1 tablet (25 mg) by mouth daily  Qty: 30 tablet, Refills: 0     Comments: Future refills by PCP Dr. Vasquez Benoit with phone number [phone].  Associated Diagnoses: Essential hypertension       empagliflozin (JARDIANCE) 10 MG TABS tablet Take 1 tablet (10 mg) by mouth daily  Qty: 90 tablet, Refills: 1     Associated Diagnoses: Acute on chronic heart failure with preserved ejection fraction (H)       isosorbide mononitrate (IMDUR) 60 MG 24 hr tablet Take 1 tablet (60 mg) by mouth daily for 30 days  Qty: 30 tablet, Refills: 0     Comments: Future refills by PCP Dr. Vasquez Benoit with phone number [phone].  Associated Diagnoses: Acute on chronic heart failure with preserved ejection fraction (H)       multivitamin w/minerals (THERA-VIT-M) tablet Take 1 tablet by mouth daily  Qty: 30 tablet, Refills: 0     Associated Diagnoses: Wound infection       spironolactone (ALDACTONE) 25 MG tablet Take 0.5 tablets (12.5 mg) by mouth daily for 30 days  Qty: 15 tablet, Refills: 0     Comments: Future refills by PCP Dr. Vasquez Benoit with phone number [phone].  Associated  Diagnoses: Acute on chronic heart failure with preserved ejection fraction (H)       torsemide (DEMADEX) 10 MG tablet Take 5 tablets (50 mg) by mouth daily for 30 days  Qty: 150 tablet, Refills: 0     Comments: Future refills by PCP Dr. Vasquez Benoit with phone number [phone].  Associated Diagnoses: Acute on chronic heart failure with preserved ejection fraction (H)

## 2024-08-29 NOTE — TELEPHONE ENCOUNTER
" Medications requested were refilled except for Torsemide.  Pt was seen by other provider (Dr Dias) after hospitla discharge  No med changs were made at that time with 8/15/24 visit   Pt saw her kidney doctor Dr Barboza 8/20/24. His note states \"Current diuretics include bumetanide 1 mg 3 times daily. Torsemide is listed but I believe she is not taking that by history\".  Pt had labs repeated for kidney function that day 8/20/24 and plan with note states \"Pending above labs consider reducing diuretic dose\".  Therefore pt to contact Dr Barboza's office  re\": recent kidney lab results ans whether Dr Barboza wants pt on Bumetanide/ Bumex (which is not on the med list currently) vs Torsemide and what  dose of med they are prescribing for pt and pt to ask  that Dr Barboza's office to  contact us either by fax or phone call to update use with what diuretic and what dose they ar prescribing so med list can be accurately updated  "

## 2024-08-30 ENCOUNTER — MEDICAL CORRESPONDENCE (OUTPATIENT)
Dept: HEALTH INFORMATION MANAGEMENT | Facility: CLINIC | Age: 64
End: 2024-08-30
Payer: COMMERCIAL

## 2024-08-30 DIAGNOSIS — N18.32 CHRONIC KIDNEY DISEASE (CKD) STAGE G3B/A1, MODERATELY DECREASED GLOMERULAR FILTRATION RATE (GFR) BETWEEN 30-44 ML/MIN/1.73 SQUARE METER AND ALBUMINURIA CREATININE RATIO LESS THAN 30 MG/G (H): Primary | ICD-10-CM

## 2024-08-30 NOTE — TELEPHONE ENCOUNTER
Home Care is calling regarding an established patient with [facility].       Requesting orders from: Vasquez Benoit  Provider is following patient: Yes  Is this a 60-day recertification request?  No    Orders Requested    Skilled Nursing  Request for discontinuation of care   Goals have been met/progressing.  Wounds have healed.       Verbal orders given.  Home Care will send orders for provider to sign.  Confirmed ok to leave a detailed message with call back.  Contact information confirmed and updated as needed.    Nehal Rodgers RN

## 2024-09-22 NOTE — PROGRESS NOTES
~Cardiology Clinic Visit~    Primary Cardiologist: Dr. Huang  Reason for visit: 3 month follow-up      Shirley Hendrikcs MRN# [id number]   YOB: 1960 Age: 63 year old       HPI:    Shirley Hendricks is a delightful 63 year old patient with past medical history significant for:    Coronary artery disease  S/p coronary angiogram 10/4/2023: occluded D1 with left to left collaterals from distal LAD to D1. Moderate CAD prox to mid RCA, iFR negative. Moderate CAD in distal small caliber rPL and distal circumflex  History of stress cardiomyopathy with recovered EF  Heart failure with preserved ejection fraction  Severe peripheral arterial disease  S/p right BKA 4/1/2024  S/p aortobifemoral bypass and left fem-pop bypass  Right femoral graft limb to above the knee popliteal bypass graft 9/2020  S/p right femoral to anterior tibial PTFE bypass graft with single vessel runoff on 10/11 followed by embolectomy of her graft on 10/13   Right carotid endarterectomy 2016  Left carotid endarterectomy 2020  Hypertension  Hyperlipidemia  COPD  Malignant B-cell lymphoma   Chronic kidney disease stage IIIb  History of previous dialysis treatment back in June 2024    I had the opportunity to see Ms. Hendricks for cardiology follow-up today. The patient has a complex peripheral and cardiovascular history as outlined above. She was recently hospitalized at [facility] on 7/30/2024- 8/7/2024 for acute hypoxic respiratory failure due to acute on chronic HFpEF. She was also found to have recurrent pleural effusion requiring thoracentesis. At the time of admission, she was noted to be off her diuretics for 1 week. She had previously been admitted 6/21/2024- 7/8/2024 and discharged with Bumex 1 gram TID for 14 days. She was diuresed and started back on Bumex 2 gram daily.    Cardiology recommendations at time of discharge jardiance 10 mg daily, change carvedilol to atenolol, increase Imdur to 60 mg daily,  consider cardioMEMs if volume continues to be an issue.    She is followed by nephrology with recent reduction in Bumex to 1 mg three times daily (previously 5 mg per day) due rise in Cr, 1.90 and thought to be over-diuresed. Repeat BMP pending.    Today, she reports doing very well from a cardiovascular standpoint. She denies chest pain/pressure or shortness of breath. Some lower extremity swelling that she reports as improved, no orthopnea or PND. No palpitations, lightheadedness, dizziness, pre-syncope or syncope. She has been working with PT/OT which she reports is going well.     Blood pressure on first check 193/87, upon recheck 168/70 and has not taken her afternoon hydralazine. She denies headaches or blurry vision.     Diagnotic studies:  Echocardiogram 7/31/2024:  1. The left ventricle is normal in structure, function and size. The visual  ejection fraction is estimated at 60%. Normal wall motion.  2. The right ventricle is normal in structure, function and size.  3. No valve disease.     Echo 6-24-24 showed EF 60%, mild mid anterior hypokinesis  Coronary angiogram 10/4/2023:  Right dominant coronary anatomy.  Completely occluded (probably chronic) D1 with left to left collaterals from distal LAD to D1.  Moderate coronary artery disease involving proximal to mid RCA which is not hemodynamically significant-IFR negative.  Moderate coronary disease involving distal small-caliber RPL and distal circumflex artery.      Assessment:  Coronary artery disease: completely occluded D1 with left to left collaterals from dLAD to D1. Moderate CAD of prox to mid RCA- negative by iFR, moderate CAD in distal small-caliber rPL and distal circumflex    Heart failure with preserved ejection fraction  LVEF: 60%  Fluid status: euvolemic; suspected dry weight: 100 pounds, 98 pounds today  Diuretic regimen: Bumex 1 mg three times daily  Ischemic evaluation: Coronary angiogram 10/4/2023 with occluded D1 with left to left  collaterals. Mild to moderate non-obstructive CAD elsewhere.  Guideline directed medical therapy:  Beta blocker: atenolol 25 mg daily  ACEI/ARB/ARNI: --   Aldactone antagonist: spironolactone 12.5 mg  SGLT2 inhibitor: Jardiance 10 mg  Counseled patient on fluid and sodium restriction, monitoring weight  Severe peripheral arterial disease  S/p right BKA 4/1/2024  S/p aortobifemoral bypass and left fem-pop bypass  Right femoral graft limb to above the knee popliteal bypass graft 9/2020  S/p right femoral to anterior tibial PTFE bypass graft with single vessel runoff on 10/11 followed by embolectomy of her graft on 10/13   Right carotid endarterectomy 2016  Left carotid endarterectomy 2020  Hypertension  BP elevated in clinic today- recheck improved 168/70 (had not taken afternoon hydralazine dose)  BP at home runs 130s-150s  Hyperlipidemia  Lipid panel 10/3/2023: cholesterol 137, TG 68, HDL 54, LDL 69  Rosuvastatin 10 mg  COPD  Malignant B-cell lymphoma   Chronic kidney disease stage IIIb  Follows with nephrology with recent adjustments in diuretics, down from Bumex 5 mg per day to 3 mg per day      Plan:   No changes to medications today. Euvolemic on exam and no angina symptoms. No indications for cardioMEMs at this time.  Continue to monitor blood pressure at home, if running consistently > 140/90 please let us know.  Discussed signs/symptoms to watch for with elevated blood pressure and when to seek evaluation in the emergency department.  Follow-up with Dr. Huang in 9 months    Thank you for the opportunity to participate in this pleasant patient's care.    We would be happy to see this patient sooner for any concerns in the meantime.    NOELLE Lara, CNP   Nurse Practitioner  [facility]    A total of 40 minutes was spent with patient, on chart review and documentation today.       Orders Placed This Encounter   Procedures    Follow-Up with Cardiology     Orders Placed This  Encounter   Medications    hydrALAZINE (APRESOLINE) 50 MG tablet     Sig: Take 1 tablet (50 mg) by mouth 3 times daily.     Medications Discontinued During This Encounter   Medication Reason    hydrALAZINE (APRESOLINE) 50 MG tablet          Encounter Diagnoses   Name Primary?    Essential hypertension     Heart failure with preserved ejection fraction, NYHA class II (H) Yes       CURRENT MEDICATIONS:  Current Outpatient Medications   Medication Sig Dispense Refill    acetaminophen (TYLENOL) 500 MG tablet Take 1-2 tablets (500-1,000 mg) by mouth every 6 hours as needed for mild pain 60 tablet 0    albuterol (PROAIR HFA/PROVENTIL HFA/VENTOLIN HFA) 108 (90 Base) MCG/ACT inhaler Inhale 2 puffs into the lungs every 4 hours as needed for shortness of breath, wheezing or cough 18 g 0    amLODIPine (NORVASC) 10 MG tablet Take 1 tablet (10 mg) by mouth daily 90 tablet 0    atenolol (TENORMIN) 25 MG tablet Take 1 tablet (25 mg) by mouth daily. 90 tablet 1    budesonide-formoterol (SYMBICORT) 160-4.5 MCG/ACT Inhaler Inhale 2 puffs into the lungs two times daily Disp 3 inhalers 30.6 g 3    clopidogrel (PLAVIX) 75 MG tablet Take 1 tablet (75 mg) by mouth daily. 90 tablet 3    diphenoxylate-atropine (LOMOTIL) 2.5-0.025 MG tablet Take 1 tablet by mouth 4 times daily as needed for diarrhea 30 tablet 0    empagliflozin (JARDIANCE) 10 MG TABS tablet Take 1 tablet (10 mg) by mouth daily. 90 tablet 1    gabapentin (NEURONTIN) 100 MG capsule Take 2 capsules (200 mg) by mouth at bedtime 180 capsule 0    hydrALAZINE (APRESOLINE) 50 MG tablet Take 1 tablet (50 mg) by mouth 3 times daily.      isosorbide mononitrate (IMDUR) 60 MG 24 hr tablet Take 1 tablet (60 mg) by mouth daily. 90 tablet 3    miconazole (MICATIN) 2 % external powder Apply topically 2 times daily 50 g 0    nitroGLYcerin (NITROSTAT) 0.4 MG sublingual tablet Place 1 tablet (0.4 mg) under the tongue See Admin Instructions for chest pain 30 tablet 11    rosuvastatin (CRESTOR)  10 MG tablet Take 1 tablet (10 mg) by mouth at bedtime 30 tablet 6    silver sulfADIAZINE (SILVADENE) 1 % external cream Apply topically daily      spironolactone (ALDACTONE) 25 MG tablet Take 0.5 tablets (12.5 mg) by mouth daily. 45 tablet 1    wound support modular (EXPEDITE) LIQD bottle Take 60 mLs by mouth daily 2000 mL 0    cetirizine (ZYRTEC) 5 MG tablet Take 1 tablet (5 mg) by mouth daily (Patient not taking: Reported on 9/23/2024) 30 tablet 0    multivitamin w/minerals (THERA-VIT-M) tablet Take 1 tablet by mouth daily. (Patient not taking: Reported on 9/23/2024) 90 tablet 3    nicotine (NICODERM CQ) 14 MG/24HR 24 hr patch Place 1 patch onto the skin every 24 hours (Patient not taking: Reported on 9/23/2024) 9 patch 0    nicotine (NICODERM CQ) 14 MG/24HR 24 hr patch Place 1 patch onto the skin daily (Patient not taking: Reported on 9/23/2024) 30 patch 0    ondansetron (ZOFRAN ODT) 4 MG ODT tab Take 1 tablet (4 mg) by mouth every 6 hours as needed for nausea or vomiting (Patient not taking: Reported on 9/23/2024)      polyethylene glycol (MIRALAX) 17 GM/Dose powder Take 17 g by mouth daily (Patient not taking: Reported on 9/23/2024) 510 g 0    saccharomyces boulardii (FLORASTOR) 250 MG capsule Take 1 capsule (250 mg) by mouth 2 times daily (Patient not taking: Reported on 9/23/2024) 60 capsule 0    torsemide (DEMADEX) 10 MG tablet Take 5 tablets (50 mg) by mouth daily for 30 days (Patient taking differently: Take 30 mg by mouth daily.) 150 tablet 0       ALLERGIES     Allergies   Allergen Reactions    Contrast Dye Anaphylaxis     Pt reported facial and throat swelling with prior CT contrast    Pantoprazole      Protonix caused diffuse edema    Chantix [Varenicline]      Terrible dreams    Gluten Meal GI Disturbance     Pt has celiac disease    Penicillins Itching and Rash       PAST MEDICAL HISTORY:  Past Medical History:   Diagnosis Date    Anxiety 12/07/2017    Bilateral carpal tunnel syndrome      Charcot-Breonna-Tooth disease     COPD (chronic obstructive pulmonary disease) (H)     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 clot    Post-menopausal     Reflux esophagitis 02/2004    EGD: esophagitis and medium HH    SBO (small bowel obstruction) (H) 08/10/2021    SVT (supraventricular tachycardia) (H24)     Thrombocytopenia (H24)     Uncomplicated asthma     Vitamin C deficiency 08/12/2018       PAST SURGICAL HISTORY:  Past Surgical History:   Procedure Laterality Date    AMPUTATE LEG ABOVE KNEE N/A 4/1/2024    Procedure: AMPUTATION, ABOVE KNEE right leg with wound vac dressing, excision of anteriotibial bypass graft, closure of (previous interventional radiology) left groin incision;  Surgeon: José Luis Hernandez MD;  Location:  OR    AMPUTATE LEG ABOVE KNEE Right 4/9/2024    Procedure: COMPLETION RIGHT ABOVE KNEE AMPUTATION;  Surgeon: José Luis Hernandez MD;  Location:  OR    ANGIOGRAM      ANGIOGRAM Right 6/23/2021    Procedure: RIGHT LOWER EXTREMITY ANGIOGRAM WITH LEFT BRACHIAL ARTERY CUTDOWN;  Surgeon: José Luis Hernandez MD;  Location:  OR    APPLY WOUND VAC Right 5/16/2024    Procedure: PLACEMENT OF WOUND VAC TO RIGHT ABOVE KNEE AMPUTATION;  Surgeon: Tay Enciso MD;  Location: [facility] OR    APPLY WOUND VAC N/A 5/20/2024    Procedure: AND PLACEMENT OF WOUND VAC;  Surgeon: José Luis Hernandez MD;   Location:  OR    BIOPSY MASS NECK Right 10/11/2023    Procedure: Right Parotid Biopsy;  Surgeon: Randal Mendoza MD;  Location:  OR    BRONCHOSCOPY FLEXIBLE AND RIGID N/A 6/26/2024    Procedure: Bronchoscopy flexible and rigid;  Surgeon: Rod Nath MD;  Location:  GI    BYPASS GRAFT FEMOROPOPLITEAL Right 09/23/2020    Procedure: RIGHT FEMORAL GRAFT TO ABOVE-KNEE POPLITEAL BYPASS USING CADAVERIC SUPERFICIAL FEMORAL ARTERY;  Surgeon: Chadwick Lozano MD;  Location:  OR    BYPASS GRAFT FEMOROPOPLITEAL Right 2/15/2022    Procedure: RIGHT SUPERFICIAL FEMORAL ARTERY GRAFT TO BELOW KNEE POPLITEAL BYPASS WITH CADAVERIC CRYOLIFE  FEMORAL-POPLITEAL ARTERY SIZE: OUTER DIAMETER: 7MM - 6MM;  Surgeon: Chadwick Lozano;  Location:  OR    BYPASS GRAFT FEMOROPOPLITEAL Right 5/26/2023    Procedure: RIGHT DISTAL SUPERFICIAL FEMORAL GRAFT TO ANTERIOR TIBIAL ARTERY WITH 26 CENTIMETER CADAVERIC SUPERFICIAL FEMORAL ARTERY GRAFT;  Surgeon: Chadwick Lozano MD;  Location:  OR    BYPASS GRAFT FEMOROPOPLITEAL Right 10/11/2023    Procedure: RIGHT FEMORAL TO POPLITEAL GRAFT THROMBECTOMY;  Surgeon: hCadwick Lozano MD;  Location:  OR    BYPASS GRAFT INSITU FEMOROPOPLITEAL Right 7/7/2021    Procedure: CREATION RIGHT FEMORAL TO POPLITEAL ARTERIAL BYPASS USING INSITU VEIN GRAFT;  Surgeon: José Luis Hernandez MD;  Location:  OR    CARDIAC CATHERIZATION  09/03/2009    multivessel CAD without target lesions, med mgmt indicated, preserved ef    CARPAL TUNNEL RELEASE RT/LT Right 05/20/2021    COLONOSCOPY N/A 12/12/2023    Procedure: Colonoscopy;  Surgeon: Corey Hoffman MD;  Location:  GI    COLONOSCOPY N/A 12/14/2023    Procedure: Colonoscopy;  Surgeon: Corey Hoffman MD;  Location:  GI    CV CORONARY ANGIOGRAM N/A 10/4/2023    Procedure: Coronary Angiogram;  Surgeon: Rogelio Ricks MD;  Location:  [facility] CATH LAB    CV PCI N/A 10/4/2023    Procedure:  Percutaneous Coronary Intervention;  Surgeon: Rogelio Ricks MD;  Location:  [facility] CATH LAB    EMBOLECTOMY LOWER EXTREMITY Right 10/6/2023    Procedure: Right anterior tibial bypass with graft, Right tibial endarterectomy,thrombectomy, Right doraslis pedis thrombectomy, Anterior Tibial vein patch.;  Surgeon: Chadwick Lozano MD;  Location:  OR    ENDARTERECTOMY CAROTID Right 05/11/2016    Procedure: ENDARTERECTOMY CAROTID;  Surgeon: Chadwick Lozano MD;  Location:  OR    ENDARTERECTOMY CAROTID Left 06/08/2020    Procedure: LEFT CAROTID ENDARTERECTOMY with distal facal vein patch  and EEG;  Surgeon: Chadwick Lozano MD;  Location:  OR    ESOPHAGOSCOPY, GASTROSCOPY, DUODENOSCOPY (EGD), COMBINED N/A 12/12/2023    Procedure: Esophagoscopy, gastroscopy, duodenoscopy (EGD), combined;  Surgeon: Corey Hoffman MD;  Location:  GI    FINE NEEDLE ASPIRATION WITHOUT IMAGING GUIDANCE Right 9/22/2023    Procedure: Fine needle aspiration without imaging guidance;  Surgeon: Kiersten Aguilera MD;  Location:  GI    FLUORESCENCE INTRAOPERATIVE VASCULAR ANGIOGRAPHY Right 12/28/2022    Procedure: RIGHT LEG ANGIOGRAM with intervention;  Surgeon: Chadwick Lozano MD;  Location:  OR    GYN SURGERY  left tube    left salpingectomy    IR ANGIOGRAM THROUGH CATHETER (ARTERIAL)  10/6/2023    IR ANGIOGRAM THROUGH CATHETER (ARTERIAL)  10/6/2023    IR ANGIOGRAM THROUGH CATHETER (ARTERIAL)  3/31/2024    IR ANGIOGRAM THROUGH CATHETER (ARTERIAL)  3/30/2024    IR CHEST TUBE PLACEMENT NON-TUNNELLED LEFT  6/23/2024    IR CVC TUNNEL PLACEMENT > 5 YRS OF AGE  4/3/2024    IR CVC TUNNEL PLACEMENT > 5 YRS OF AGE  6/23/2024    IR CVC TUNNEL REMOVAL RIGHT  5/28/2024    IR CVC TUNNEL REMOVAL RIGHT  7/3/2024    IR CVC TUNNEL REVISION RIGHT  4/15/2024    IR LOWER EXTREMITY ANGIOGRAM RIGHT  05/25/2021    IR LOWER EXTREMITY ANGIOGRAM RIGHT  10/5/2023    IR LOWER EXTREMITY ANGIOGRAM RIGHT   3/29/2024    IR OR ANGIOGRAM  6/23/2021    IR OR ANGIOGRAM  12/28/2022    IRRIGATION AND DEBRIDEMENT LOWER EXTREMITY, COMBINED Right 5/16/2024    Procedure: IRRIGATION AND DEBRIDEMENT OF RIGHT ABOVE KNEE AMPUTATION;  Surgeon: Tay Enciso MD;  Location:  OR    IRRIGATION AND DEBRIDEMENT LOWER EXTREMITY, COMBINED Right 5/20/2024    Procedure: IRRIGATION AND DEBRIDMENT RIGHT LOWER EXTREMITY;  Surgeon: José Luis Hernandez MD;  Location:  OR    LAPAROSCOPIC CHOLECYSTECTOMY N/A 09/27/2017    Procedure: LAPAROSCOPIC CHOLECYSTECTOMY;  LAPAROSCOPIC CHOLECYSTECTOMY;  Surgeon: Jacoby Tapia MD;  Location:  [facility]    LAPAROSCOPY DIAGNOSTIC (GENERAL) N/A 8/11/2021    Procedure: Exploratory laparoscopy,  laparoscopic lysis of adhesions, laparotomy;  Surgeon: Corina Ferreira MD;  Location:  OR    OR ANGIOGRAM, LOWER EXTREMITY Right 10/11/2023    Procedure: Or Angiogram, Lower Extremity;  Surgeon: Chadwick Lozano MD;  Location:  OR    ORTHOPEDIC SURGERY      left knee surgery    REPAIR ANEURYSM FEMORAL ARTERY Left 12/28/2022    Procedure: REPAIR LEFT FEMORAL PSEUDOANEURYSM;  Surgeon: Chadwick Lozano MD;  Location:  OR    VASCULAR SURGERY  aoto bi fem  left fem-AK pop    [facility] FABRIC WRAPPING OF ABDOMINAL ANEURYSM      [facility] NONSPECIFIC PROCEDURE  12/2000    angioplasty with stent right fem. a.    [facility] NONSPECIFIC PROCEDURE  1987    sinus surgery    [facility] NONSPECIFIC PROCEDURE  09/02/2009    Emergent left groin exploration with oversewing of bleeding angiographic site. 2. Endarterectomy of common femoral-proximal superficial femoral artery with greater saphenous vein patch angioplasty.   a. Walker of accessory right greater saphenous vein.     [facility] NONSPECIFIC PROCEDURE  08/27/2009    occluded left common iliac and external iliac arteries were successfully revascularized with stenting to 8 and 7 mm        FAMILY HISTORY:  Family History   Problem Relation Age of Onset    Cancer Mother          bladder    Cardiovascular Father         alive,multiple heart attacks    Diabetes Maternal Grandmother     Lung Cancer Maternal Grandmother     Blood Disease Brother         clotting disorder       SOCIAL HISTORY:  Social History     Socioeconomic History    Marital status:      Spouse name: None    Number of children: None    Years of education: None    Highest education level: None   Tobacco Use    Smoking status: Former     Current packs/day: 0.25     Average packs/day: 0.3 packs/day for 56.7 years (14.2 ttl pk-yrs)     Types: Cigarettes     Start date: 1968    Smokeless tobacco: Never    Tobacco comments:     1/2 PPD. 1-3 cigs a day   Vaping Use    Vaping status: Never Used   Substance and Sexual Activity    Alcohol use: Not Currently     Comment: x1-2 yr    Drug use: Yes     Types: Marijuana     Comment: 2x per day    Sexual activity: Yes     Partners: Male     Birth control/protection: Surgical     Social Determinants of Health     Financial Resource Strain: Low Risk  (1/8/2024)    Financial Resource Strain     Within the past 12 months, have you or your family members you live with been unable to get utilities (heat, electricity) when it was really needed?: No   Food Insecurity: Low Risk  (1/8/2024)    Food Insecurity     Within the past 12 months, did you worry that your food would run out before you got money to buy more?: No     Within the past 12 months, did the food you bought just not last and you didn t have money to get more?: No   Transportation Needs: Low Risk  (1/8/2024)    Transportation Needs     Within the past 12 months, has lack of transportation kept you from medical appointments, getting your medicines, non-medical meetings or appointments, work, or from getting things that you need?: No   Interpersonal Safety: Low Risk  (8/15/2024)    Interpersonal Safety     Do you feel physically and emotionally safe where you currently live?: Yes     Within the past 12 months, have you been hit,  "slapped, kicked or otherwise physically hurt by someone?: No     Within the past 12 months, have you been humiliated or emotionally abused in other ways by your partner or ex-partner?: No   Housing Stability: Low Risk  (1/8/2024)    Housing Stability     Do you have housing? : Yes     Are you worried about losing your housing?: No       Review of Systems:  Skin:        Eyes:       ENT:       Respiratory:       Cardiovascular:       Gastroenterology:      Genitourinary:       Musculoskeletal:       Neurologic:       Psychiatric:       Heme/Lymph/Imm:       Endocrine:         Physical Exam:    Vitals: BP (!) 168/70   Pulse 60   Ht 1.549 m (5' 1\")   Wt 44.8 kg (98 lb 12.8 oz)   LMP  (LMP Unknown)   SpO2 99%   BMI 18.67 kg/m    Constitutional: Well nourished and in no apparent distress.  Eyes: Pupils equal, round. Sclerae anicteric.   HEENT: Normocephalic, atraumatic.   Neck: Supple. JVD not present  Respiratory: Breathing non-labored. Lungs clear to auscultation bilaterally. No crackles, wheezes, rhonchi, or rales.  Cardiovascular:  Regular rate and rhythm, normal S1 and S2. No murmur, rub, or gallop.  Skin: Warm, dry.   Extremities: Trace lower extremity edema  Neurologic: No gross motor deficits. Alert, awake, and oriented to person, place and time.  Psychiatric: Affect appropriate.        Recent Lab Results:  LIPID RESULTS:  Lab Results   Component Value Date    CHOL 137 10/03/2023    CHOL 143 07/07/2021    HDL 54 10/03/2023    HDL 53 07/07/2021    LDL 69 10/03/2023    LDL 76 07/07/2021    TRIG 68 10/03/2023    TRIG 71 07/07/2021    CHOLHDLRATIO 2.6 03/15/2013       LIVER ENZYME RESULTS:  Lab Results   Component Value Date    AST 28 07/30/2024    AST 17 01/05/2021    ALT 17 07/30/2024    ALT 25 01/05/2021       CBC RESULTS:  Lab Results   Component Value Date    WBC 5.0 08/05/2024    WBC 5.3 07/09/2021    RBC 2.79 (L) 08/05/2024    RBC 2.88 (L) 07/09/2021    HGB 8.5 (L) 08/05/2024    HGB 8.8 (L) 07/09/2021    " HCT 26.9 (L) 08/05/2024    HCT 27.3 (L) 07/09/2021    MCV 96 08/05/2024    MCV 95 07/09/2021    MCH 30.5 08/05/2024    MCH 30.6 07/09/2021    MCHC 31.6 08/05/2024    MCHC 32.2 07/09/2021    RDW 14.3 08/05/2024    RDW 12.5 07/09/2021     08/06/2024     07/09/2021       BMP RESULTS:  Lab Results   Component Value Date     (L) 08/16/2024     (L) 07/09/2021    POTASSIUM 4.3 08/16/2024    POTASSIUM 4.3 12/29/2022    POTASSIUM 5.2 07/09/2021    CHLORIDE 97 (L) 08/16/2024    CHLORIDE 98 12/29/2022    CHLORIDE 103 07/09/2021    CO2 24 08/16/2024    CO2 24 12/29/2022    CO2 27 07/09/2021    ANIONGAP 13 08/16/2024    ANIONGAP 8 12/29/2022    ANIONGAP 2 (L) 07/09/2021    GLC 56 (L) 08/16/2024     (H) 06/27/2024    GLC 93 12/29/2022    GLC 98 07/09/2021    BUN 58.3 (H) 08/16/2024    BUN 17 12/29/2022    BUN 13 07/09/2021    CR 1.60 (H) 08/16/2024    CR 0.77 07/09/2021    GFRESTIMATED 35 (L) 08/16/2024    GFRESTIMATED >60 10/03/2023    GFRESTIMATED 83 07/09/2021    GFRESTBLACK >90 07/09/2021    SONIA 9.0 08/16/2024    SONIA 8.6 07/09/2021        A1C RESULTS:  Lab Results   Component Value Date    A1C 4.6 06/21/2024    A1C 5.4 09/23/2020       INR RESULTS:  Lab Results   Component Value Date    INR 1.31 (H) 04/11/2024    INR 1.38 (H) 10/07/2023    INR 1.01 09/24/2020    INR 0.95 05/10/2016           CC  Tamica Mcfadden, PATabithaC  [address]  [place] [address]

## 2024-09-23 ENCOUNTER — OFFICE VISIT (OUTPATIENT)
Dept: CARDIOLOGY | Facility: CLINIC | Age: 64
End: 2024-09-23
Payer: COMMERCIAL

## 2024-09-23 VITALS
HEIGHT: 61 IN | DIASTOLIC BLOOD PRESSURE: 70 MMHG | SYSTOLIC BLOOD PRESSURE: 168 MMHG | HEART RATE: 60 BPM | OXYGEN SATURATION: 99 % | BODY MASS INDEX: 18.65 KG/M2 | WEIGHT: 98.8 LBS

## 2024-09-23 DIAGNOSIS — I50.30 HEART FAILURE WITH PRESERVED EJECTION FRACTION, NYHA CLASS II (H): Primary | ICD-10-CM

## 2024-09-23 DIAGNOSIS — I10 ESSENTIAL HYPERTENSION: ICD-10-CM

## 2024-09-23 PROCEDURE — 99215 OFFICE O/P EST HI 40 MIN: CPT

## 2024-09-23 RX ORDER — HYDRALAZINE HYDROCHLORIDE 50 MG/1
50 TABLET, FILM COATED ORAL 3 TIMES DAILY
COMMUNITY
Start: 2024-09-23

## 2024-09-23 NOTE — LETTER
9/23/2024    Vasquez Benoit MD  [address] [facility] [address]    RE: Shirley Hendricks       Dear Colleague,     I had the pleasure of seeing Shirley Hendricks in the [facility].          ~Cardiology Clinic Visit~    Primary Cardiologist: Dr. Huang  Reason for visit: 3 month follow-up      Shirley Hendricks MRN# [id number]   YOB: 1960 Age: 63 year old       HPI:    Shirley Hendricks is a delightful 63 year old patient with past medical history significant for:    Coronary artery disease  S/p coronary angiogram 10/4/2023: occluded D1 with left to left collaterals from distal LAD to D1. Moderate CAD prox to mid RCA, iFR negative. Moderate CAD in distal small caliber rPL and distal circumflex  History of stress cardiomyopathy with recovered EF  Heart failure with preserved ejection fraction  Severe peripheral arterial disease  S/p right BKA 4/1/2024  S/p aortobifemoral bypass and left fem-pop bypass  Right femoral graft limb to above the knee popliteal bypass graft 9/2020  S/p right femoral to anterior tibial PTFE bypass graft with single vessel runoff on 10/11 followed by embolectomy of her graft on 10/13   Right carotid endarterectomy 2016  Left carotid endarterectomy 2020  Hypertension  Hyperlipidemia  COPD  Malignant B-cell lymphoma   Chronic kidney disease stage IIIb  History of previous dialysis treatment back in June 2024    I had the opportunity to see Ms. Hendricks for cardiology follow-up today. The patient has a complex peripheral and cardiovascular history as outlined above. She was recently hospitalized at [facility] on 7/30/2024- 8/7/2024 for acute hypoxic respiratory failure due to acute on chronic HFpEF. She was also found to have recurrent pleural effusion requiring thoracentesis. At the time of admission, she was noted to be off her diuretics for 1 week. She had previously been admitted 6/21/2024- 7/8/2024 and discharged with Bumex 1 gram  TID for 14 days. She was diuresed and started back on Bumex 2 gram daily.    Cardiology recommendations at time of discharge jardiance 10 mg daily, change carvedilol to atenolol, increase Imdur to 60 mg daily, consider cardioMEMs if volume continues to be an issue.    She is followed by nephrology with recent reduction in Bumex to 1 mg three times daily (previously 5 mg per day) due rise in Cr, 1.90 and thought to be over-diuresed. Repeat BMP pending.    Today, she reports doing very well from a cardiovascular standpoint. She denies chest pain/pressure or shortness of breath. Some lower extremity swelling that she reports as improved, no orthopnea or PND. No palpitations, lightheadedness, dizziness, pre-syncope or syncope. She has been working with PT/OT which she reports is going well.     Blood pressure on first check 193/87, upon recheck 168/70 and has not taken her afternoon hydralazine. She denies headaches or blurry vision.     Diagnotic studies:  Echocardiogram 7/31/2024:  1. The left ventricle is normal in structure, function and size. The visual  ejection fraction is estimated at 60%. Normal wall motion.  2. The right ventricle is normal in structure, function and size.  3. No valve disease.     Echo 6-24-24 showed EF 60%, mild mid anterior hypokinesis  Coronary angiogram 10/4/2023:  Right dominant coronary anatomy.  Completely occluded (probably chronic) D1 with left to left collaterals from distal LAD to D1.  Moderate coronary artery disease involving proximal to mid RCA which is not hemodynamically significant-IFR negative.  Moderate coronary disease involving distal small-caliber RPL and distal circumflex artery.      Assessment:  Coronary artery disease: completely occluded D1 with left to left collaterals from dLAD to D1. Moderate CAD of prox to mid RCA- negative by iFR, moderate CAD in distal small-caliber rPL and distal circumflex    Heart failure with preserved ejection fraction  LVEF: 60%  Fluid  status: euvolemic; suspected dry weight: 100 pounds, 98 pounds today  Diuretic regimen: Bumex 1 mg three times daily  Ischemic evaluation: Coronary angiogram 10/4/2023 with occluded D1 with left to left collaterals. Mild to moderate non-obstructive CAD elsewhere.  Guideline directed medical therapy:  Beta blocker: atenolol 25 mg daily  ACEI/ARB/ARNI: --   Aldactone antagonist: spironolactone 12.5 mg  SGLT2 inhibitor: Jardiance 10 mg  Counseled patient on fluid and sodium restriction, monitoring weight  Severe peripheral arterial disease  S/p right BKA 4/1/2024  S/p aortobifemoral bypass and left fem-pop bypass  Right femoral graft limb to above the knee popliteal bypass graft 9/2020  S/p right femoral to anterior tibial PTFE bypass graft with single vessel runoff on 10/11 followed by embolectomy of her graft on 10/13   Right carotid endarterectomy 2016  Left carotid endarterectomy 2020  Hypertension  BP elevated in clinic today- recheck improved 168/70 (had not taken afternoon hydralazine dose)  BP at home runs 130s-150s  Hyperlipidemia  Lipid panel 10/3/2023: cholesterol 137, TG 68, HDL 54, LDL 69  Rosuvastatin 10 mg  COPD  Malignant B-cell lymphoma   Chronic kidney disease stage IIIb  Follows with nephrology with recent adjustments in diuretics, down from Bumex 5 mg per day to 3 mg per day      Plan:   No changes to medications today. Euvolemic on exam and no angina symptoms. No indications for cardioMEMs at this time.  Continue to monitor blood pressure at home, if running consistently > 140/90 please let us know.  Discussed signs/symptoms to watch for with elevated blood pressure and when to seek evaluation in the emergency department.  Follow-up with Dr. Huang in 9 months    Thank you for the opportunity to participate in this pleasant patient's care.    We would be happy to see this patient sooner for any concerns in the meantime.    NOELLE Lara, CNP   Nurse Practitioner  [facility]  Care    A total of 40 minutes was spent with patient, on chart review and documentation today.       Orders Placed This Encounter   Procedures     Follow-Up with Cardiology     Orders Placed This Encounter   Medications     hydrALAZINE (APRESOLINE) 50 MG tablet     Sig: Take 1 tablet (50 mg) by mouth 3 times daily.     Medications Discontinued During This Encounter   Medication Reason     hydrALAZINE (APRESOLINE) 50 MG tablet          Encounter Diagnoses   Name Primary?     Essential hypertension      Heart failure with preserved ejection fraction, NYHA class II (H) Yes       CURRENT MEDICATIONS:  Current Outpatient Medications   Medication Sig Dispense Refill     acetaminophen (TYLENOL) 500 MG tablet Take 1-2 tablets (500-1,000 mg) by mouth every 6 hours as needed for mild pain 60 tablet 0     albuterol (PROAIR HFA/PROVENTIL HFA/VENTOLIN HFA) 108 (90 Base) MCG/ACT inhaler Inhale 2 puffs into the lungs every 4 hours as needed for shortness of breath, wheezing or cough 18 g 0     amLODIPine (NORVASC) 10 MG tablet Take 1 tablet (10 mg) by mouth daily 90 tablet 0     atenolol (TENORMIN) 25 MG tablet Take 1 tablet (25 mg) by mouth daily. 90 tablet 1     budesonide-formoterol (SYMBICORT) 160-4.5 MCG/ACT Inhaler Inhale 2 puffs into the lungs two times daily Disp 3 inhalers 30.6 g 3     clopidogrel (PLAVIX) 75 MG tablet Take 1 tablet (75 mg) by mouth daily. 90 tablet 3     diphenoxylate-atropine (LOMOTIL) 2.5-0.025 MG tablet Take 1 tablet by mouth 4 times daily as needed for diarrhea 30 tablet 0     empagliflozin (JARDIANCE) 10 MG TABS tablet Take 1 tablet (10 mg) by mouth daily. 90 tablet 1     gabapentin (NEURONTIN) 100 MG capsule Take 2 capsules (200 mg) by mouth at bedtime 180 capsule 0     hydrALAZINE (APRESOLINE) 50 MG tablet Take 1 tablet (50 mg) by mouth 3 times daily.       isosorbide mononitrate (IMDUR) 60 MG 24 hr tablet Take 1 tablet (60 mg) by mouth daily. 90 tablet 3     miconazole (MICATIN) 2 % external  powder Apply topically 2 times daily 50 g 0     nitroGLYcerin (NITROSTAT) 0.4 MG sublingual tablet Place 1 tablet (0.4 mg) under the tongue See Admin Instructions for chest pain 30 tablet 11     rosuvastatin (CRESTOR) 10 MG tablet Take 1 tablet (10 mg) by mouth at bedtime 30 tablet 6     silver sulfADIAZINE (SILVADENE) 1 % external cream Apply topically daily       spironolactone (ALDACTONE) 25 MG tablet Take 0.5 tablets (12.5 mg) by mouth daily. 45 tablet 1     wound support modular (EXPEDITE) LIQD bottle Take 60 mLs by mouth daily 2000 mL 0     cetirizine (ZYRTEC) 5 MG tablet Take 1 tablet (5 mg) by mouth daily (Patient not taking: Reported on 9/23/2024) 30 tablet 0     multivitamin w/minerals (THERA-VIT-M) tablet Take 1 tablet by mouth daily. (Patient not taking: Reported on 9/23/2024) 90 tablet 3     nicotine (NICODERM CQ) 14 MG/24HR 24 hr patch Place 1 patch onto the skin every 24 hours (Patient not taking: Reported on 9/23/2024) 9 patch 0     nicotine (NICODERM CQ) 14 MG/24HR 24 hr patch Place 1 patch onto the skin daily (Patient not taking: Reported on 9/23/2024) 30 patch 0     ondansetron (ZOFRAN ODT) 4 MG ODT tab Take 1 tablet (4 mg) by mouth every 6 hours as needed for nausea or vomiting (Patient not taking: Reported on 9/23/2024)       polyethylene glycol (MIRALAX) 17 GM/Dose powder Take 17 g by mouth daily (Patient not taking: Reported on 9/23/2024) 510 g 0     saccharomyces boulardii (FLORASTOR) 250 MG capsule Take 1 capsule (250 mg) by mouth 2 times daily (Patient not taking: Reported on 9/23/2024) 60 capsule 0     torsemide (DEMADEX) 10 MG tablet Take 5 tablets (50 mg) by mouth daily for 30 days (Patient taking differently: Take 30 mg by mouth daily.) 150 tablet 0       ALLERGIES     Allergies   Allergen Reactions     Contrast Dye Anaphylaxis     Pt reported facial and throat swelling with prior CT contrast     Pantoprazole      Protonix caused diffuse edema     Chantix [Varenicline]      Terrible  dreams     Gluten Meal GI Disturbance     Pt has celiac disease     Penicillins Itching and Rash       PAST MEDICAL HISTORY:  Past Medical History:   Diagnosis Date     Anxiety 12/07/2017     Bilateral carpal tunnel syndrome      Charcot-Breonna-Tooth disease      COPD (chronic obstructive pulmonary disease) (H)      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 Simons dermatology     Embolism and thrombosis of unspecified artery (H) 08/2000    Protein C,S, Leiden FV, Lupus Inhibitor Negative     Gastroesophageal reflux disease      Hyperlipidaemia      Hypertension      Lupus (H)     skin     Mild major depression (H24) 11/07/2017     Myocardial infarction (H)     x3     Osteoarthrosis, unspecified whether generalized or localized, unspecified site      PAD (peripheral artery disease) (H24)      Peripheral vascular disease, unspecified (H24) 12/2000    s/p angioplasty with stent right femoral a.; Right iliac and femoral a. clot     Post-menopausal      Reflux esophagitis 02/2004    EGD: esophagitis and medium HH     SBO (small bowel obstruction) (H) 08/10/2021     SVT (supraventricular tachycardia) (H24)      Thrombocytopenia (H24)      Uncomplicated asthma      Vitamin C deficiency 08/12/2018       PAST SURGICAL HISTORY:  Past Surgical History:   Procedure Laterality Date     AMPUTATE LEG ABOVE KNEE N/A 4/1/2024    Procedure: AMPUTATION, ABOVE KNEE right leg with wound vac dressing, excision of anteriotibial bypass graft, closure of (previous interventional radiology) left groin incision;  Surgeon: José Luis Hernandez MD;  Location:  OR     AMPUTATE LEG ABOVE KNEE Right 4/9/2024    Procedure: COMPLETION RIGHT ABOVE KNEE AMPUTATION;  Surgeon: José Luis Hernandez MD;  Location:  OR     ANGIOGRAM       ANGIOGRAM Right 6/23/2021    Procedure: RIGHT LOWER EXTREMITY ANGIOGRAM WITH LEFT BRACHIAL ARTERY CUTDOWN;  Surgeon: José Luis Hernandez MD;  Location:  OR     APPLY  WOUND VAC Right 5/16/2024    Procedure: PLACEMENT OF WOUND VAC TO RIGHT ABOVE KNEE AMPUTATION;  Surgeon: Tay Enciso MD;  Location:  OR     APPLY WOUND VAC N/A 5/20/2024    Procedure: AND PLACEMENT OF WOUND VAC;  Surgeon: José Luis Hernandez MD;  Location:  OR     BIOPSY MASS NECK Right 10/11/2023    Procedure: Right Parotid Biopsy;  Surgeon: Randal Mendoza MD;  Location:  OR     BRONCHOSCOPY FLEXIBLE AND RIGID N/A 6/26/2024    Procedure: Bronchoscopy flexible and rigid;  Surgeon: Rod Nath MD;  Location:  GI     BYPASS GRAFT FEMOROPOPLITEAL Right 09/23/2020    Procedure: RIGHT FEMORAL GRAFT TO ABOVE-KNEE POPLITEAL BYPASS USING CADAVERIC SUPERFICIAL FEMORAL ARTERY;  Surgeon: Chadwick Lozano MD;  Location:  OR     BYPASS GRAFT FEMOROPOPLITEAL Right 2/15/2022    Procedure: RIGHT SUPERFICIAL FEMORAL ARTERY GRAFT TO BELOW KNEE POPLITEAL BYPASS WITH CADAVERIC CRYOLIFE  FEMORAL-POPLITEAL ARTERY SIZE: OUTER DIAMETER: 7MM - 6MM;  Surgeon: Chadwick Lozano;  Location:  OR     BYPASS GRAFT FEMOROPOPLITEAL Right 5/26/2023    Procedure: RIGHT DISTAL SUPERFICIAL FEMORAL GRAFT TO ANTERIOR TIBIAL ARTERY WITH 26 CENTIMETER CADAVERIC SUPERFICIAL FEMORAL ARTERY GRAFT;  Surgeon: Chadwick Lozano MD;  Location:  OR     BYPASS GRAFT FEMOROPOPLITEAL Right 10/11/2023    Procedure: RIGHT FEMORAL TO POPLITEAL GRAFT THROMBECTOMY;  Surgeon: Chadwick Lozano MD;  Location:  OR     BYPASS GRAFT INSITU FEMOROPOPLITEAL Right 7/7/2021    Procedure: CREATION RIGHT FEMORAL TO POPLITEAL ARTERIAL BYPASS USING INSITU VEIN GRAFT;  Surgeon: José Luis Hernandez MD;  Location:  OR     CARDIAC CATHERIZATION  09/03/2009    multivessel CAD without target lesions, med mgmt indicated, preserved ef     CARPAL TUNNEL RELEASE RT/LT Right 05/20/2021     COLONOSCOPY N/A 12/12/2023    Procedure: Colonoscopy;  Surgeon: Corey Hoffman MD;  Location:  GI     COLONOSCOPY N/A 12/14/2023     Procedure: Colonoscopy;  Surgeon: Corey Hoffman MD;  Location:  GI     CV CORONARY ANGIOGRAM N/A 10/4/2023    Procedure: Coronary Angiogram;  Surgeon: Rogelio Ricks MD;  Location:  HEART CARDIAC CATH LAB     CV PCI N/A 10/4/2023    Procedure: Percutaneous Coronary Intervention;  Surgeon: Rogelio Ricks MD;  Location:  HEART CARDIAC CATH LAB     EMBOLECTOMY LOWER EXTREMITY Right 10/6/2023    Procedure: Right anterior tibial bypass with graft, Right tibial endarterectomy,thrombectomy, Right doraslis pedis thrombectomy, Anterior Tibial vein patch.;  Surgeon: Chadwick Lozano MD;  Location:  OR     ENDARTERECTOMY CAROTID Right 05/11/2016    Procedure: ENDARTERECTOMY CAROTID;  Surgeon: Chadwick Lozano MD;  Location:  OR     ENDARTERECTOMY CAROTID Left 06/08/2020    Procedure: LEFT CAROTID ENDARTERECTOMY with distal facal vein patch  and EEG;  Surgeon: Chadwick Lozano MD;  Location:  OR     ESOPHAGOSCOPY, GASTROSCOPY, DUODENOSCOPY (EGD), COMBINED N/A 12/12/2023    Procedure: Esophagoscopy, gastroscopy, duodenoscopy (EGD), combined;  Surgeon: Corey Hoffman MD;  Location:  GI     FINE NEEDLE ASPIRATION WITHOUT IMAGING GUIDANCE Right 9/22/2023    Procedure: Fine needle aspiration without imaging guidance;  Surgeon: Kiersten Aguilera MD;  Location:  GI     FLUORESCENCE INTRAOPERATIVE VASCULAR ANGIOGRAPHY Right 12/28/2022    Procedure: RIGHT LEG ANGIOGRAM with intervention;  Surgeon: Chadwick Lozano MD;  Location:  OR     GYN SURGERY  left tube    left salpingectomy     IR ANGIOGRAM THROUGH CATHETER (ARTERIAL)  10/6/2023     IR ANGIOGRAM THROUGH CATHETER (ARTERIAL)  10/6/2023     IR ANGIOGRAM THROUGH CATHETER (ARTERIAL)  3/31/2024     IR ANGIOGRAM THROUGH CATHETER (ARTERIAL)  3/30/2024     IR CHEST TUBE PLACEMENT NON-TUNNELLED LEFT  6/23/2024     IR CVC TUNNEL PLACEMENT > 5 YRS OF AGE  4/3/2024     IR CVC TUNNEL PLACEMENT > 5 YRS OF AGE   6/23/2024     IR CVC TUNNEL REMOVAL RIGHT  5/28/2024     IR CVC TUNNEL REMOVAL RIGHT  7/3/2024     IR CVC TUNNEL REVISION RIGHT  4/15/2024     IR LOWER EXTREMITY ANGIOGRAM RIGHT  05/25/2021     IR LOWER EXTREMITY ANGIOGRAM RIGHT  10/5/2023     IR LOWER EXTREMITY ANGIOGRAM RIGHT  3/29/2024     IR OR ANGIOGRAM  6/23/2021     IR OR ANGIOGRAM  12/28/2022     IRRIGATION AND DEBRIDEMENT LOWER EXTREMITY, COMBINED Right 5/16/2024    Procedure: IRRIGATION AND DEBRIDEMENT OF RIGHT ABOVE KNEE AMPUTATION;  Surgeon: Tay Enciso MD;  Location:  OR     IRRIGATION AND DEBRIDEMENT LOWER EXTREMITY, COMBINED Right 5/20/2024    Procedure: IRRIGATION AND DEBRIDMENT RIGHT LOWER EXTREMITY;  Surgeon: José Luis Hernandez MD;  Location:  OR     LAPAROSCOPIC CHOLECYSTECTOMY N/A 09/27/2017    Procedure: LAPAROSCOPIC CHOLECYSTECTOMY;  LAPAROSCOPIC CHOLECYSTECTOMY;  Surgeon: Jacoby Tapia MD;  Location:  SD     LAPAROSCOPY DIAGNOSTIC (GENERAL) N/A 8/11/2021    Procedure: Exploratory laparoscopy,  laparoscopic lysis of adhesions, laparotomy;  Surgeon: Corina Ferreira MD;  Location:  OR     OR ANGIOGRAM, LOWER EXTREMITY Right 10/11/2023    Procedure: Or Angiogram, Lower Extremity;  Surgeon: Chadwick Lozano MD;  Location:  OR     ORTHOPEDIC SURGERY      left knee surgery     REPAIR ANEURYSM FEMORAL ARTERY Left 12/28/2022    Procedure: REPAIR LEFT FEMORAL PSEUDOANEURYSM;  Surgeon: Chadwick Lozano MD;  Location:  OR     VASCULAR SURGERY  aoto bi fem  left fem-AK pop     ZZC FABRIC WRAPPING OF ABDOMINAL ANEURYSM       [facility] NONSPECIFIC PROCEDURE  12/2000    angioplasty with stent right fem. a.     ZZC NONSPECIFIC PROCEDURE  1987    sinus surgery     ZZC NONSPECIFIC PROCEDURE  09/02/2009    Emergent left groin exploration with oversewing of bleeding angiographic site. 2. Endarterectomy of common femoral-proximal superficial femoral artery with greater saphenous vein patch angioplasty.   a. [place] of  accessory right greater saphenous vein.      [facility] NONSPECIFIC PROCEDURE  08/27/2009    occluded left common iliac and external iliac arteries were successfully revascularized with stenting to 8 and 7 mm        FAMILY HISTORY:  Family History   Problem Relation Age of Onset     Cancer Mother         bladder     Cardiovascular Father         alive,multiple heart attacks     Diabetes Maternal Grandmother      Lung Cancer Maternal Grandmother      Blood Disease Brother         clotting disorder       SOCIAL HISTORY:  Social History     Socioeconomic History     Marital status:      Spouse name: None     Number of children: None     Years of education: None     Highest education level: None   Tobacco Use     Smoking status: Former     Current packs/day: 0.25     Average packs/day: 0.3 packs/day for 56.7 years (14.2 ttl pk-yrs)     Types: Cigarettes     Start date: 1968     Smokeless tobacco: Never     Tobacco comments:     1/2 PPD. 1-3 cigs a day   Vaping Use     Vaping status: Never Used   Substance and Sexual Activity     Alcohol use: Not Currently     Comment: x1-2 yr     Drug use: Yes     Types: Marijuana     Comment: 2x per day     Sexual activity: Yes     Partners: Male     Birth control/protection: Surgical     Social Determinants of Health     Financial Resource Strain: Low Risk  (1/8/2024)    Financial Resource Strain      Within the past 12 months, have you or your family members you live with been unable to get utilities (heat, electricity) when it was really needed?: No   Food Insecurity: Low Risk  (1/8/2024)    Food Insecurity      Within the past 12 months, did you worry that your food would run out before you got money to buy more?: No      Within the past 12 months, did the food you bought just not last and you didn t have money to get more?: No   Transportation Needs: Low Risk  (1/8/2024)    Transportation Needs      Within the past 12 months, has lack of transportation kept you from medical  "appointments, getting your medicines, non-medical meetings or appointments, work, or from getting things that you need?: No   Interpersonal Safety: Low Risk  (8/15/2024)    Interpersonal Safety      Do you feel physically and emotionally safe where you currently live?: Yes      Within the past 12 months, have you been hit, slapped, kicked or otherwise physically hurt by someone?: No      Within the past 12 months, have you been humiliated or emotionally abused in other ways by your partner or ex-partner?: No   Housing Stability: Low Risk  (1/8/2024)    Housing Stability      Do you have housing? : Yes      Are you worried about losing your housing?: No       Review of Systems:  Skin:        Eyes:       ENT:       Respiratory:       Cardiovascular:       Gastroenterology:      Genitourinary:       Musculoskeletal:       Neurologic:       Psychiatric:       Heme/Lymph/Imm:       Endocrine:         Physical Exam:    Vitals: BP (!) 168/70   Pulse 60   Ht 1.549 m (5' 1\")   Wt 44.8 kg (98 lb 12.8 oz)   LMP  (LMP Unknown)   SpO2 99%   BMI 18.67 kg/m    Constitutional: Well nourished and in no apparent distress.  Eyes: Pupils equal, round. Sclerae anicteric.   HEENT: Normocephalic, atraumatic.   Neck: Supple. JVD not present  Respiratory: Breathing non-labored. Lungs clear to auscultation bilaterally. No crackles, wheezes, rhonchi, or rales.  Cardiovascular:  Regular rate and rhythm, normal S1 and S2. No murmur, rub, or gallop.  Skin: Warm, dry.   Extremities: Trace lower extremity edema  Neurologic: No gross motor deficits. Alert, awake, and oriented to person, place and time.  Psychiatric: Affect appropriate.        Recent Lab Results:  LIPID RESULTS:  Lab Results   Component Value Date    CHOL 137 10/03/2023    CHOL 143 07/07/2021    HDL 54 10/03/2023    HDL 53 07/07/2021    LDL 69 10/03/2023    LDL 76 07/07/2021    TRIG 68 10/03/2023    TRIG 71 07/07/2021    CHOLHDLRATIO 2.6 03/15/2013       LIVER ENZYME " RESULTS:  Lab Results   Component Value Date    AST 28 07/30/2024    AST 17 01/05/2021    ALT 17 07/30/2024    ALT 25 01/05/2021       CBC RESULTS:  Lab Results   Component Value Date    WBC 5.0 08/05/2024    WBC 5.3 07/09/2021    RBC 2.79 (L) 08/05/2024    RBC 2.88 (L) 07/09/2021    HGB 8.5 (L) 08/05/2024    HGB 8.8 (L) 07/09/2021    HCT 26.9 (L) 08/05/2024    HCT 27.3 (L) 07/09/2021    MCV 96 08/05/2024    MCV 95 07/09/2021    MCH 30.5 08/05/2024    MCH 30.6 07/09/2021    MCHC 31.6 08/05/2024    MCHC 32.2 07/09/2021    RDW 14.3 08/05/2024    RDW 12.5 07/09/2021     08/06/2024     07/09/2021       BMP RESULTS:  Lab Results   Component Value Date     (L) 08/16/2024     (L) 07/09/2021    POTASSIUM 4.3 08/16/2024    POTASSIUM 4.3 12/29/2022    POTASSIUM 5.2 07/09/2021    CHLORIDE 97 (L) 08/16/2024    CHLORIDE 98 12/29/2022    CHLORIDE 103 07/09/2021    CO2 24 08/16/2024    CO2 24 12/29/2022    CO2 27 07/09/2021    ANIONGAP 13 08/16/2024    ANIONGAP 8 12/29/2022    ANIONGAP 2 (L) 07/09/2021    GLC 56 (L) 08/16/2024     (H) 06/27/2024    GLC 93 12/29/2022    GLC 98 07/09/2021    BUN 58.3 (H) 08/16/2024    BUN 17 12/29/2022    BUN 13 07/09/2021    CR 1.60 (H) 08/16/2024    CR 0.77 07/09/2021    GFRESTIMATED 35 (L) 08/16/2024    GFRESTIMATED >60 10/03/2023    GFRESTIMATED 83 07/09/2021    GFRESTBLACK >90 07/09/2021    SONIA 9.0 08/16/2024    SONIA 8.6 07/09/2021        A1C RESULTS:  Lab Results   Component Value Date    A1C 4.6 06/21/2024    A1C 5.4 09/23/2020       INR RESULTS:  Lab Results   Component Value Date    INR 1.31 (H) 04/11/2024    INR 1.38 (H) 10/07/2023    INR 1.01 09/24/2020    INR 0.95 05/10/2016           CC  Tamica Mcfadden PA-C  [address]  [place] [address]      Thank you for allowing me to participate in the care of your patient.      Sincerely,     NOELLE Lara [facility]  cc:    Tamica Mcfadden, PATabithaC  [address]  [place] [address]

## 2024-09-23 NOTE — PATIENT INSTRUCTIONS
Thank you for your visit with the [facility] today.    Today's Summary:    No changes to your medications today  Continue to monitor your blood pressure at home. If you notice it consistently runs > 140/90 let us know.  Seek medical attention if your blood pressure is > 180 after your blood pressure medications or if you experience severe headache or blurry vision.    Follow-up:  Cardiology follow up at [facility]: Follow-up around June or July next year with Dr. Huang.     Cardiology Scheduling ~[phone]  Cardiology Clinic RN ~ [phone]    It was a pleasure seeing you today.     NOELLE Lara, CNP  Certified Nurse Practitioner  [facility] [place]  September 23, 2024  ________________________________________________________

## 2024-10-09 ENCOUNTER — TELEPHONE (OUTPATIENT)
Dept: CARDIOLOGY | Facility: CLINIC | Age: 64
End: 2024-10-09
Payer: COMMERCIAL

## 2024-10-09 NOTE — TELEPHONE ENCOUNTER
[facility] Call Center    Phone Message    May a detailed message be left on voicemail: yes     Reason for Call: Other: Shirley does not know what the appointment is for this Friday, 10/11.  Please call her back to explain.     Action Taken: Other: Cardiology    Travel Screening: Not Applicable     Thank you!  [facility]

## 2024-10-09 NOTE — CONFIDENTIAL NOTE
9/23/24 OV with Aleja RODRÍGUEZ  Plan:   No changes to medications today. Euvolemic on exam and no angina symptoms. No indications for cardioMEMs at this time.  Continue to monitor blood pressure at home, if running consistently > 140/90 please let us know.  Discussed signs/symptoms to watch for with elevated blood pressure and when to seek evaluation in the emergency department.  Follow-up with Dr. Huang in 9 months      OV not needed as patient has no concerns. Pt plans to follow up with Dr. Huang in 9 months.

## 2024-10-14 ENCOUNTER — LAB (OUTPATIENT)
Dept: LAB | Facility: CLINIC | Age: 64
End: 2024-10-14
Payer: COMMERCIAL

## 2024-10-14 DIAGNOSIS — N18.32 CHRONIC KIDNEY DISEASE (CKD) STAGE G3B/A1, MODERATELY DECREASED GLOMERULAR FILTRATION RATE (GFR) BETWEEN 30-44 ML/MIN/1.73 SQUARE METER AND ALBUMINURIA CREATININE RATIO LESS THAN 30 MG/G (H): ICD-10-CM

## 2024-10-14 DIAGNOSIS — N18.9 CHRONIC KIDNEY DISEASE, UNSPECIFIED CKD STAGE: ICD-10-CM

## 2024-10-14 LAB
ANION GAP SERPL CALCULATED.3IONS-SCNC: 10 MMOL/L (ref 7–15)
BUN SERPL-MCNC: 40.6 MG/DL (ref 8–23)
CALCIUM SERPL-MCNC: 9.4 MG/DL (ref 8.8–10.4)
CHLORIDE SERPL-SCNC: 104 MMOL/L (ref 98–107)
CREAT SERPL-MCNC: 1.47 MG/DL (ref 0.51–0.95)
EGFRCR SERPLBLD CKD-EPI 2021: 39 ML/MIN/1.73M2
GLUCOSE SERPL-MCNC: 86 MG/DL (ref 70–99)
HCO3 SERPL-SCNC: 23 MMOL/L (ref 22–29)
POTASSIUM SERPL-SCNC: 6 MMOL/L (ref 3.4–5.3)
SODIUM SERPL-SCNC: 137 MMOL/L (ref 135–145)

## 2024-10-14 PROCEDURE — 80048 BASIC METABOLIC PNL TOTAL CA: CPT

## 2024-10-14 PROCEDURE — 36415 COLL VENOUS BLD VENIPUNCTURE: CPT

## 2024-10-16 ENCOUNTER — TELEPHONE (OUTPATIENT)
Dept: INTERNAL MEDICINE | Facility: CLINIC | Age: 64
End: 2024-10-16
Payer: COMMERCIAL

## 2024-10-16 NOTE — TELEPHONE ENCOUNTER
Pre-procedure checklist reviewed. Pt verified name and date of birth.     Relayed provider message;      Rose Dias MD  10/16/2024  2:56 PM CDT Back to Top      Team - please contact patient and have her return for immediate recheck or go to ED for critical potassium. I saw her once a couple months ago and not sure why this lab was sent ot me but it doesn't look like her usual team has seen the result.     Pt plans to contact nephrology and has a clinic a appointment tomorrow.     Provider instruction reinforced, reviewed risks of not seeking immediate care. Pt verbalized understanding and stated she was going to contact nephrology clinic now.

## 2024-10-17 ENCOUNTER — TRANSFERRED RECORDS (OUTPATIENT)
Dept: HEALTH INFORMATION MANAGEMENT | Facility: CLINIC | Age: 64
End: 2024-10-17
Payer: COMMERCIAL

## 2024-10-20 ENCOUNTER — HEALTH MAINTENANCE LETTER (OUTPATIENT)
Age: 64
End: 2024-10-20

## 2024-10-23 ENCOUNTER — MYC MEDICAL ADVICE (OUTPATIENT)
Dept: PHYSICAL MEDICINE AND REHAB | Facility: CLINIC | Age: 64
End: 2024-10-23

## 2024-10-23 ENCOUNTER — TELEPHONE (OUTPATIENT)
Dept: PHYSICAL MEDICINE AND REHAB | Facility: CLINIC | Age: 64
End: 2024-10-23
Payer: COMMERCIAL

## 2024-10-23 NOTE — CONFIDENTIAL NOTE
Pre-visit phone call-       LPN called pt and reminded them of their upcoming appointment on Thursday 10/24/24 at 1200 pm with Dr. Robbins in the Amputee clinic.   Pt stated that the appointment still worked for them.     Pt was reminded of where the clinic was located:     We are located in the [facility] at [address] [place] [address], [address].  Our building is located across the street from [facility] and offers free parking in our on-site lot.  Please take the stairs or elevator to the second floor of the [facility] and check in at the  located in the Specialty Clinic, [facility].   From Sign In Desk:     Department Address: [address] [place] [address], [address] [address]   Department Phone: [phone]     Leonor Hooks LPN

## 2024-10-23 NOTE — PROGRESS NOTES
PM&R Clinic Note     Patient Name: Shirley Hendricks : 1960 Medical Record: [id number]     Level of Amputation:  Right transfemoral  : Jacoby Coyle  Date of Surgery: 24  Etiology: Critical limb ischemia         History of Present Illness:     Shirley Hendricks is a 64 year old female with peripheral artery disease, multiple bypass grafts and thrombectomies, charcot-tawana-tooth deformity bilaterally, B-cell lymphoma, CAD, DM2, COPD, tobacco use disorder, celiac disease, takotsubo cardiomyopathy, and depression/anxiety who was admitted on 3/29/24 for critical right lower extremity ischemia with failed salvage resulting in right transfemoral amputation on 24.  Her hospitalization was complicated by LIMA requiring hemodialysis.  She went to the ARU afterward and progressed with therapy with further complication of poor wound healing, requiring rehospitalization for eschar incision and debridement.  She also developed a coccygeal pressure injury.  She required a thoracentesis and continued dialysis, but she eventually started making urine.  She also required bronchoscopy for mucus plugging. She was ultimately discharged on 24.    She has been readmitted to the ED for shortness of breath in 24 and 24, requiring admission for acute respiratory failure.  CXR showed pleural effusion and was managed with diuretics discharging on 24.    She underwent home physical therapy from  to September.  She was able to stand with a walker and hop walk around the house.  She still uses the commode in her bedroom rather than her bathroom for convenience, as the bathroom is too small for a wheelchair.  She can  front of the sink and cook independently.  She showers with a shower bench, requiring assist for transfer.  She does car and chair/bed transfers independently.    She last fell on the  off her commode because her hand slipped when she was trying to take off  her briefs.  She scraped her arm with a bruise.    Previous to the amputation, she was able to walk in the community through the grocery store.  She was able to assist her  in a wheelchair.  She was walking most of the day.  She was driving back then.  She did stairs sparingly because of pain and right leg weakness, otherwise her brother would help do her laundry.    Her brother, who had been previously helping her, is now blind.  Her oldest son is also providing effort to help.      She has a home exercise program.  She lifts weights 3x a week, doing 10 reps for 8 activities for each arm.    Her goals are to be able to walk around her house, stand up and cook, walk to the bathroom, and be able to drive again.  She wants to be able to go to the store and to her family.    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 clot    Post-menopausal     Reflux esophagitis 02/2004    EGD: esophagitis and medium HH    SBO (small bowel obstruction) (H) 08/10/2021    SVT (supraventricular tachycardia) (H)     Thrombocytopenia (H)     Uncomplicated asthma     Vitamin C deficiency 08/12/2018     Past Surgical History:   Procedure Laterality Date    AMPUTATE LEG  ABOVE KNEE N/A 4/1/2024    Procedure: AMPUTATION, ABOVE KNEE right leg with wound vac dressing, excision of anteriotibial bypass graft, closure of (previous interventional radiology) left groin incision;  Surgeon: José Luis Hernandez MD;  Location: [facility] OR    AMPUTATE LEG ABOVE KNEE Right 4/9/2024    Procedure: COMPLETION RIGHT ABOVE KNEE AMPUTATION;  Surgeon: José Luis Hernandez MD;  Location: [facility] OR    ANGIOGRAM      ANGIOGRAM Right 6/23/2021    Procedure: RIGHT LOWER EXTREMITY ANGIOGRAM WITH LEFT BRACHIAL ARTERY CUTDOWN;  Surgeon: José Luis Hernandez MD;  Location: [facility] OR    APPLY WOUND VAC Right 5/16/2024    Procedure: PLACEMENT OF WOUND VAC TO RIGHT ABOVE KNEE AMPUTATION;  Surgeon: Tay Enciso MD;  Location: [facility] OR    APPLY WOUND VAC N/A 5/20/2024    Procedure: AND PLACEMENT OF WOUND VAC;  Surgeon: José Luis Hernandez MD;  Location:  OR    BIOPSY MASS NECK Right 10/11/2023    Procedure: Right Parotid Biopsy;  Surgeon: Randal Mendoza MD;  Location:  OR    BRONCHOSCOPY FLEXIBLE AND RIGID N/A 6/26/2024    Procedure: Bronchoscopy flexible and rigid;  Surgeon: Rod Nath MD;  Location:  GI    BYPASS GRAFT FEMOROPOPLITEAL Right 09/23/2020    Procedure: RIGHT FEMORAL GRAFT TO ABOVE-KNEE POPLITEAL BYPASS USING CADAVERIC SUPERFICIAL FEMORAL ARTERY;  Surgeon: Chadwick Lozano MD;  Location: [facility] OR    BYPASS GRAFT FEMOROPOPLITEAL Right 2/15/2022    Procedure: RIGHT SUPERFICIAL FEMORAL ARTERY GRAFT TO BELOW KNEE POPLITEAL BYPASS WITH CADAVERIC CRYOLIFE  FEMORAL-POPLITEAL ARTERY SIZE: OUTER DIAMETER: 7MM - 6MM;  Surgeon: Chadwick Lozano;  Location: [facility]    BYPASS GRAFT FEMOROPOPLITEAL Right 5/26/2023    Procedure: RIGHT DISTAL SUPERFICIAL FEMORAL GRAFT TO ANTERIOR TIBIAL ARTERY WITH 26 CENTIMETER CADAVERIC SUPERFICIAL FEMORAL ARTERY GRAFT;  Surgeon: Chadwick Lozano MD;  Location: [facility]    BYPASS GRAFT FEMOROPOPLITEAL Right 10/11/2023    Procedure: RIGHT FEMORAL TO POPLITEAL  GRAFT THROMBECTOMY;  Surgeon: Chadwick Lozano MD;  Location:  OR    BYPASS GRAFT INSITU FEMOROPOPLITEAL Right 7/7/2021    Procedure: CREATION RIGHT FEMORAL TO POPLITEAL ARTERIAL BYPASS USING INSITU VEIN GRAFT;  Surgeon: José Luis Hernandez MD;  Location:  OR    CARDIAC CATHERIZATION  09/03/2009    multivessel CAD without target lesions, med mgmt indicated, preserved ef    CARPAL TUNNEL RELEASE RT/LT Right 05/20/2021    COLONOSCOPY N/A 12/12/2023    Procedure: Colonoscopy;  Surgeon: Corey Hoffman MD;  Location:  GI    COLONOSCOPY N/A 12/14/2023    Procedure: Colonoscopy;  Surgeon: Corey Hoffman MD;  Location:  GI    CV CORONARY ANGIOGRAM N/A 10/4/2023    Procedure: Coronary Angiogram;  Surgeon: Rogelio Ricks MD;  Location:  HEART CARDIAC CATH LAB    CV PCI N/A 10/4/2023    Procedure: Percutaneous Coronary Intervention;  Surgeon: Rogelio Ricks MD;  Location:  HEART CARDIAC CATH LAB    EMBOLECTOMY LOWER EXTREMITY Right 10/6/2023    Procedure: Right anterior tibial bypass with graft, Right tibial endarterectomy,thrombectomy, Right doraslis pedis thrombectomy, Anterior Tibial vein patch.;  Surgeon: Chadwick Lozano MD;  Location:  OR    ENDARTERECTOMY CAROTID Right 05/11/2016    Procedure: ENDARTERECTOMY CAROTID;  Surgeon: Chadwick Lozano MD;  Location:  OR    ENDARTERECTOMY CAROTID Left 06/08/2020    Procedure: LEFT CAROTID ENDARTERECTOMY with distal facal vein patch  and EEG;  Surgeon: Chadwick Lozano MD;  Location:  OR    ESOPHAGOSCOPY, GASTROSCOPY, DUODENOSCOPY (EGD), COMBINED N/A 12/12/2023    Procedure: Esophagoscopy, gastroscopy, duodenoscopy (EGD), combined;  Surgeon: Corey Hoffman MD;  Location:  GI    FINE NEEDLE ASPIRATION WITHOUT IMAGING GUIDANCE Right 9/22/2023    Procedure: Fine needle aspiration without imaging guidance;  Surgeon: Kiersten Aguilera MD;  Location:  GI    FLUORESCENCE INTRAOPERATIVE  VASCULAR ANGIOGRAPHY Right 12/28/2022    Procedure: RIGHT LEG ANGIOGRAM with intervention;  Surgeon: Chadwick Lozano MD;  Location:  OR    GYN SURGERY  left tube    left salpingectomy    IR ANGIOGRAM THROUGH CATHETER (ARTERIAL)  10/6/2023    IR ANGIOGRAM THROUGH CATHETER (ARTERIAL)  10/6/2023    IR ANGIOGRAM THROUGH CATHETER (ARTERIAL)  3/31/2024    IR ANGIOGRAM THROUGH CATHETER (ARTERIAL)  3/30/2024    IR CHEST TUBE PLACEMENT NON-TUNNELLED LEFT  6/23/2024    IR CVC TUNNEL PLACEMENT > 5 YRS OF AGE  4/3/2024    IR CVC TUNNEL PLACEMENT > 5 YRS OF AGE  6/23/2024    IR CVC TUNNEL REMOVAL RIGHT  5/28/2024    IR CVC TUNNEL REMOVAL RIGHT  7/3/2024    IR CVC TUNNEL REVISION RIGHT  4/15/2024    IR LOWER EXTREMITY ANGIOGRAM RIGHT  05/25/2021    IR LOWER EXTREMITY ANGIOGRAM RIGHT  10/5/2023    IR LOWER EXTREMITY ANGIOGRAM RIGHT  3/29/2024    IR OR ANGIOGRAM  6/23/2021    IR OR ANGIOGRAM  12/28/2022    IRRIGATION AND DEBRIDEMENT LOWER EXTREMITY, COMBINED Right 5/16/2024    Procedure: IRRIGATION AND DEBRIDEMENT OF RIGHT ABOVE KNEE AMPUTATION;  Surgeon: Tay Enciso MD;  Location:  OR    IRRIGATION AND DEBRIDEMENT LOWER EXTREMITY, COMBINED Right 5/20/2024    Procedure: IRRIGATION AND DEBRIDMENT RIGHT LOWER EXTREMITY;  Surgeon: José Luis Hernandez MD;  Location:  OR    LAPAROSCOPIC CHOLECYSTECTOMY N/A 09/27/2017    Procedure: LAPAROSCOPIC CHOLECYSTECTOMY;  LAPAROSCOPIC CHOLECYSTECTOMY;  Surgeon: Jacoby Tapia MD;  Location:  [facility]    LAPAROSCOPY DIAGNOSTIC (GENERAL) N/A 8/11/2021    Procedure: Exploratory laparoscopy,  laparoscopic lysis of adhesions, laparotomy;  Surgeon: Corina Ferreira MD;  Location:  OR    OR ANGIOGRAM, LOWER EXTREMITY Right 10/11/2023    Procedure: Or Angiogram, Lower Extremity;  Surgeon: Chadwick Lozano MD;  Location:  OR    ORTHOPEDIC SURGERY      left knee surgery    REPAIR ANEURYSM FEMORAL ARTERY Left 12/28/2022    Procedure: REPAIR LEFT FEMORAL PSEUDOANEURYSM;   Surgeon: Chadwick Lozano MD;  Location:  OR    VASCULAR SURGERY  aoto bi fem  left fem-AK pop    [facility] FABRIC WRAPPING OF ABDOMINAL ANEURYSM      [facility] NONSPECIFIC PROCEDURE  12/2000    angioplasty with stent right fem. a.    [facility] NONSPECIFIC PROCEDURE  1987    sinus surgery    [facility] NONSPECIFIC PROCEDURE  09/02/2009    Emergent left groin exploration with oversewing of bleeding angiographic site. 2. Endarterectomy of common femoral-proximal superficial femoral artery with greater saphenous vein patch angioplasty.   a. [place] of accessory right greater saphenous vein.     [facility] NONSPECIFIC PROCEDURE  08/27/2009    occluded left common iliac and external iliac arteries were successfully revascularized with stenting to 8 and 7 mm        Social History     Tobacco Use    Smoking status: Former     Current packs/day: 0.25     Average packs/day: 0.3 packs/day for 56.8 years (14.2 ttl pk-yrs)     Types: Cigarettes     Start date: 1968    Smokeless tobacco: Never    Tobacco comments:     1/2 PPD. 1-3 cigs a day   Substance Use Topics    Alcohol use: Not Currently     Comment: x1-2 yr     Family History   Problem Relation Age of Onset    Cancer Mother         bladder    Cardiovascular Father         alive,multiple heart attacks    Diabetes Maternal Grandmother     Lung Cancer Maternal Grandmother     Blood Disease Brother         clotting disorder            Medications:     Current Outpatient Medications   Medication Sig Dispense Refill    acetaminophen (TYLENOL) 500 MG tablet Take 1-2 tablets (500-1,000 mg) by mouth every 6 hours as needed for mild pain 60 tablet 0    albuterol (PROAIR HFA/PROVENTIL HFA/VENTOLIN HFA) 108 (90 Base) MCG/ACT inhaler Inhale 2 puffs into the lungs every 4 hours as needed for shortness of breath, wheezing or cough 18 g 0    amLODIPine (NORVASC) 10 MG tablet Take 1 tablet (10 mg) by mouth daily 90 tablet 0    atenolol (TENORMIN) 25 MG tablet Take 1 tablet (25 mg) by mouth daily. 90 tablet 1     budesonide-formoterol (SYMBICORT) 160-4.5 MCG/ACT Inhaler Inhale 2 puffs into the lungs two times daily Disp 3 inhalers 30.6 g 3    cetirizine (ZYRTEC) 5 MG tablet Take 1 tablet (5 mg) by mouth daily (Patient not taking: Reported on 9/23/2024) 30 tablet 0    clopidogrel (PLAVIX) 75 MG tablet Take 1 tablet (75 mg) by mouth daily. 90 tablet 3    diphenoxylate-atropine (LOMOTIL) 2.5-0.025 MG tablet Take 1 tablet by mouth 4 times daily as needed for diarrhea 30 tablet 0    empagliflozin (JARDIANCE) 10 MG TABS tablet Take 1 tablet (10 mg) by mouth daily. 90 tablet 1    gabapentin (NEURONTIN) 100 MG capsule Take 2 capsules (200 mg) by mouth at bedtime 180 capsule 0    hydrALAZINE (APRESOLINE) 50 MG tablet Take 1 tablet (50 mg) by mouth 3 times daily.      isosorbide mononitrate (IMDUR) 60 MG 24 hr tablet Take 1 tablet (60 mg) by mouth daily. 90 tablet 3    miconazole (MICATIN) 2 % external powder Apply topically 2 times daily 50 g 0    multivitamin w/minerals (THERA-VIT-M) tablet Take 1 tablet by mouth daily. (Patient not taking: Reported on 9/23/2024) 90 tablet 3    nicotine (NICODERM CQ) 14 MG/24HR 24 hr patch Place 1 patch onto the skin every 24 hours (Patient not taking: Reported on 9/23/2024) 9 patch 0    nicotine (NICODERM CQ) 14 MG/24HR 24 hr patch Place 1 patch onto the skin daily (Patient not taking: Reported on 9/23/2024) 30 patch 0    nitroGLYcerin (NITROSTAT) 0.4 MG sublingual tablet Place 1 tablet (0.4 mg) under the tongue See Admin Instructions for chest pain 30 tablet 11    ondansetron (ZOFRAN ODT) 4 MG ODT tab Take 1 tablet (4 mg) by mouth every 6 hours as needed for nausea or vomiting (Patient not taking: Reported on 9/23/2024)      polyethylene glycol (MIRALAX) 17 GM/Dose powder Take 17 g by mouth daily (Patient not taking: Reported on 9/23/2024) 510 g 0    rosuvastatin (CRESTOR) 10 MG tablet Take 1 tablet (10 mg) by mouth at bedtime 30 tablet 6    saccharomyces boulardii (FLORASTOR) 250 MG capsule  "Take 1 capsule (250 mg) by mouth 2 times daily (Patient not taking: Reported on 2024) 60 capsule 0    silver sulfADIAZINE (SILVADENE) 1 % external cream Apply topically daily      spironolactone (ALDACTONE) 25 MG tablet Take 0.5 tablets (12.5 mg) by mouth daily. 45 tablet 1    torsemide (DEMADEX) 10 MG tablet Take 5 tablets (50 mg) by mouth daily for 30 days (Patient taking differently: Take 30 mg by mouth daily.) 150 tablet 0    wound support modular (EXPEDITE) LIQD bottle Take 60 mLs by mouth daily 2000 mL 0            Allergies:     Allergies   Allergen Reactions    Contrast Dye Anaphylaxis     Pt reported facial and throat swelling with prior CT contrast    Pantoprazole      Protonix caused diffuse edema    Chantix [Varenicline]      Terrible dreams    Gluten Meal GI Disturbance     Pt has celiac disease    Penicillins Itching and Rash              ROS:     A focused ROS is negative other than the symptoms noted above in the HPI.           Physical Examination:     VITAL SIGNS: LMP  (LMP Unknown)   BMI: Estimated body mass index is 18.67 kg/m  as calculated from the following:    Height as of 24: 1.549 m (5' 1\").    Weight as of 24: 44.8 kg (98 lb 12.8 oz).  Gait: Deferred, no prosthesis currently  Shoulder: Good AROM.  Some mild GH pain with modified robertson on right.  Strength:   Hip adduction Hip Flexion Knee Extension A. Plantarflex A. Dorsiflex   R 5 5 NA NA NA   L 5 5 5 5 2   Sensation:  Sensation to light touch intact in the BLE  Residual right leg: well healed 2nd intention wound on anterior right residual extremity with old induration.  No current erythema or tenderness.  Left Le degree contracture of left foot.  Very mild varus ankle.  Prosthesis: None         Labs:     Lipids  Recent Labs   Lab Test 10/03/23  0941 02/15/22  0632 21  0624 20  0844   CHOL 137 124 143 127   LDL 69 61 76 62   HDL 54 48* 53 48*   TRIG 68 76 71 83     HgbA1c  Recent Labs   Lab Test " 06/21/24  0959 01/08/24  1542 02/15/22  0632 09/23/20  0844   A1C 4.6 5.2 5.3 5.4            Assessment/Plan:     Shirley Hendricks is a 64 year old female with a relevant PMH of CAD, DM2, CKD, COPD, tobacco use, and depression/anxiety with peripheral artery disease with failed revascularization leading to a right transfemoral amputation on 4/1/24.  She has been to the ED and readmitted several times for acute respiratory failure.    -------------  Medical Necessity for Prosthesis    Shirley Hendricks is a 64 year old female with no cardiopulmonary, MSK or neurologic diagnosis that would limit her from successful ambulation with a lower extremity prosthesis.   She sustained a right transfemoral amputation on 4/1/24 due to peripheral vascular disease.  Her post operative course has been eventful with renal failure and repeat respiratory exacerbation as well as poor wound healing.  However, after initial debridement, her right leg surgical wound healed well.  She is back to regular exercise, is breathing well, and she is at baseline kidney function.   Her prognosis for prosthesis use is excellent.    She is currently utilizing a wheelchair and hop-walking with a walker for ambulation  She is able to perform most transfers independently, with limited space in the bathroom explaining her need for assistance with toileting and showering.  She has improved endurance for community and home ambulation.    Her functional level prior to amputation was K2  Her expected functional potential: K2.  She should be capable of short distance but frequent ambulation and longer term standing with enough support to do functional standing activities with the prosthesis.    She has expressed a desire to ambulate with a prosthesis, is motivated to go through the fitting process, and will participate in prosthesis training.  The residual limb is in good condition and the incision is well healed with no open wound, drainage, erythema,  swelling or odor.   I am recommending a new lower extremity prosthesis be fabricated.   -------------    #Right TFA  - ordered K2 right transfemoral prosthesis  - physical therapy for gait training with prosthesis  - informed of community amputee peer services  - gave information for [facility] self referral driving certification resources    #Left ankle contracture  - ordered custom AFO  - physical therapy for AFO assessment and training    #Follow up  - 3 months  - prosthesis fit, AFO success, driving status    Jacobo Robbins MD  Physical Medicine & Rehabilitation    I spent a total of 54 minutes on the date of service doing chart review, history and exam, documentation, and further activities as noted above.

## 2024-10-24 ENCOUNTER — OFFICE VISIT (OUTPATIENT)
Dept: PHYSICAL MEDICINE AND REHAB | Facility: CLINIC | Age: 64
End: 2024-10-24
Payer: COMMERCIAL

## 2024-10-24 VITALS — SYSTOLIC BLOOD PRESSURE: 169 MMHG | DIASTOLIC BLOOD PRESSURE: 76 MMHG | HEART RATE: 62 BPM | OXYGEN SATURATION: 97 %

## 2024-10-24 DIAGNOSIS — Z89.611 S/P AKA (ABOVE KNEE AMPUTATION) UNILATERAL, RIGHT (H): Primary | ICD-10-CM

## 2024-10-24 DIAGNOSIS — M24.572 ANKLE CONTRACTURE, LEFT: ICD-10-CM

## 2024-10-24 PROCEDURE — 99215 OFFICE O/P EST HI 40 MIN: CPT | Performed by: STUDENT IN AN ORGANIZED HEALTH CARE EDUCATION/TRAINING PROGRAM

## 2024-10-24 NOTE — LETTER
10/24/2024       RE: Shirley Hendricks  [id number]  [facility] [address]     Dear Colleague,    Thank you for referring your patient, Shirley Hendricks, to the [facility] PHYSICAL MEDICINE AND REHABILITATION CLINIC [place] at [facility]. Please see a copy of my visit note below.           PM&R Clinic Note     Patient Name: Shirley Hendricks : 1960 Medical Record: [id number]     Level of Amputation:  Right transfemoral  : Jacoby Leonides  Date of Surgery: 24  Etiology: Critical limb ischemia         History of Present Illness:     Shirley Hendricks is a 64 year old female with peripheral artery disease, multiple bypass grafts and thrombectomies, charcot-tawana-tooth deformity bilaterally, B-cell lymphoma, CAD, DM2, COPD, tobacco use disorder, celiac disease, takotsubo cardiomyopathy, and depression/anxiety who was admitted on 3/29/24 for critical right lower extremity ischemia with failed salvage resulting in right transfemoral amputation on 24.  Her hospitalization was complicated by LIMA requiring hemodialysis.  She went to the ARU afterward and progressed with therapy with further complication of poor wound healing, requiring rehospitalization for eschar incision and debridement.  She also developed a coccygeal pressure injury.  She required a thoracentesis and continued dialysis, but she eventually started making urine.  She also required bronchoscopy for mucus plugging. She was ultimately discharged on 24.    She has been readmitted to the ED for shortness of breath in 24 and 24, requiring admission for acute respiratory failure.  CXR showed pleural effusion and was managed with diuretics discharging on 24.    She underwent home physical therapy from  to September.  She was able to stand with a walker and hop walk around the house.  She still uses the commode in her bedroom rather than her  bathroom for convenience, as the bathroom is too small for a wheelchair.  She can  front of the sink and cook independently.  She showers with a shower bench, requiring assist for transfer.  She does car and chair/bed transfers independently.    She last fell on the 28th of July off her commode because her hand slipped when she was trying to take off her briefs.  She scraped her arm with a bruise.    Previous to the amputation, she was able to walk in the community through the grocery store.  She was able to assist her  in a wheelchair.  She was walking most of the day.  She was driving back then.  She did stairs sparingly because of pain and right leg weakness, otherwise her brother would help do her laundry.    Her brother, who had been previously helping her, is now blind.  Her oldest son is also providing effort to help.      She has a home exercise program.  She lifts weights 3x a week, doing 10 reps for 8 activities for each arm.    Her goals are to be able to walk around her house, stand up and cook, walk to the bathroom, and be able to drive again.  She wants to be able to go to the store and to her family.    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 clot     Post-menopausal      Reflux esophagitis 02/2004    EGD: esophagitis and medium HH     SBO (small bowel obstruction) (H) 08/10/2021     SVT (supraventricular tachycardia) (H)      Thrombocytopenia (H)      Uncomplicated asthma      Vitamin C deficiency 08/12/2018     Past Surgical History:   Procedure Laterality Date     AMPUTATE LEG ABOVE KNEE N/A 4/1/2024    Procedure: AMPUTATION, ABOVE KNEE right leg with wound vac dressing, excision of anteriotibial bypass graft, closure of (previous interventional radiology) left groin incision;  Surgeon: José Luis Hernandez MD;  Location:  OR     AMPUTATE LEG ABOVE KNEE Right 4/9/2024    Procedure: COMPLETION RIGHT ABOVE KNEE AMPUTATION;  Surgeon: José Luis Hernandez MD;  Location:  OR     ANGIOGRAM       ANGIOGRAM Right 6/23/2021    Procedure: RIGHT LOWER EXTREMITY ANGIOGRAM WITH LEFT BRACHIAL ARTERY CUTDOWN;  Surgeon: José uLis Hernandez MD;  Location:  OR     APPLY WOUND VAC Right 5/16/2024    Procedure: PLACEMENT OF WOUND VAC TO RIGHT ABOVE KNEE AMPUTATION;  Surgeon: Tay Enciso MD;  Location:  OR     APPLY WOUND VAC N/A 5/20/2024    Procedure: AND PLACEMENT OF WOUND VAC;  Surgeon: José Luis Hernandez MD;  Location:  OR     BIOPSY MASS NECK Right 10/11/2023    Procedure: Right Parotid Biopsy;  Surgeon: Randal Mendoza MD;  Location:  OR     BRONCHOSCOPY FLEXIBLE AND RIGID N/A 6/26/2024    Procedure: Bronchoscopy flexible and rigid;  Surgeon: Rod Nath MD;  Location:  GI     BYPASS GRAFT FEMOROPOPLITEAL Right 09/23/2020    Procedure: RIGHT FEMORAL GRAFT TO ABOVE-KNEE POPLITEAL BYPASS USING CADAVERIC SUPERFICIAL FEMORAL ARTERY;  Surgeon: Chadwick Lozano MD;  Location:  OR     BYPASS GRAFT FEMOROPOPLITEAL Right 2/15/2022    Procedure: RIGHT SUPERFICIAL FEMORAL ARTERY GRAFT TO BELOW KNEE POPLITEAL BYPASS WITH CADAVERIC CRYOLIFE  FEMORAL-POPLITEAL ARTERY SIZE: OUTER  DIAMETER: 7MM - 6MM;  Surgeon: Chadwick Lozano;  Location:  OR     BYPASS GRAFT FEMOROPOPLITEAL Right 5/26/2023    Procedure: RIGHT DISTAL SUPERFICIAL FEMORAL GRAFT TO ANTERIOR TIBIAL ARTERY WITH 26 CENTIMETER CADAVERIC SUPERFICIAL FEMORAL ARTERY GRAFT;  Surgeon: Chadwick Lozano MD;  Location:  OR     BYPASS GRAFT FEMOROPOPLITEAL Right 10/11/2023    Procedure: RIGHT FEMORAL TO POPLITEAL GRAFT THROMBECTOMY;  Surgeon: Chadwick Lozano MD;  Location:  OR     BYPASS GRAFT INSITU FEMOROPOPLITEAL Right 7/7/2021    Procedure: CREATION RIGHT FEMORAL TO POPLITEAL ARTERIAL BYPASS USING INSITU VEIN GRAFT;  Surgeon: José Luis Hernandez MD;  Location:  OR     CARDIAC CATHERIZATION  09/03/2009    multivessel CAD without target lesions, med mgmt indicated, preserved ef     CARPAL TUNNEL RELEASE RT/LT Right 05/20/2021     COLONOSCOPY N/A 12/12/2023    Procedure: Colonoscopy;  Surgeon: Corey Hoffman MD;  Location:  GI     COLONOSCOPY N/A 12/14/2023    Procedure: Colonoscopy;  Surgeon: Corey Hoffman MD;  Location:  GI     CV CORONARY ANGIOGRAM N/A 10/4/2023    Procedure: Coronary Angiogram;  Surgeon: Rogelio Ricks MD;  Location:  HEART CARDIAC CATH LAB     CV PCI N/A 10/4/2023    Procedure: Percutaneous Coronary Intervention;  Surgeon: Rogelio Ricks MD;  Location:  HEART CARDIAC CATH LAB     EMBOLECTOMY LOWER EXTREMITY Right 10/6/2023    Procedure: Right anterior tibial bypass with graft, Right tibial endarterectomy,thrombectomy, Right doraslis pedis thrombectomy, Anterior Tibial vein patch.;  Surgeon: Chadwick Lozano MD;  Location:  OR     ENDARTERECTOMY CAROTID Right 05/11/2016    Procedure: ENDARTERECTOMY CAROTID;  Surgeon: Chadwick Lozano MD;  Location:  OR     ENDARTERECTOMY CAROTID Left 06/08/2020    Procedure: LEFT CAROTID ENDARTERECTOMY with distal facal vein patch  and EEG;  Surgeon: Chadwick Lozano MD;  Location:  OR      ESOPHAGOSCOPY, GASTROSCOPY, DUODENOSCOPY (EGD), COMBINED N/A 12/12/2023    Procedure: Esophagoscopy, gastroscopy, duodenoscopy (EGD), combined;  Surgeon: Corey Hoffman MD;  Location:  GI     FINE NEEDLE ASPIRATION WITHOUT IMAGING GUIDANCE Right 9/22/2023    Procedure: Fine needle aspiration without imaging guidance;  Surgeon: Kiersten Aguilera MD;  Location:  GI     FLUORESCENCE INTRAOPERATIVE VASCULAR ANGIOGRAPHY Right 12/28/2022    Procedure: RIGHT LEG ANGIOGRAM with intervention;  Surgeon: Chadwick Lozano MD;  Location:  OR     GYN SURGERY  left tube    left salpingectomy     IR ANGIOGRAM THROUGH CATHETER (ARTERIAL)  10/6/2023     IR ANGIOGRAM THROUGH CATHETER (ARTERIAL)  10/6/2023     IR ANGIOGRAM THROUGH CATHETER (ARTERIAL)  3/31/2024     IR ANGIOGRAM THROUGH CATHETER (ARTERIAL)  3/30/2024     IR CHEST TUBE PLACEMENT NON-TUNNELLED LEFT  6/23/2024     IR CVC TUNNEL PLACEMENT > 5 YRS OF AGE  4/3/2024     IR CVC TUNNEL PLACEMENT > 5 YRS OF AGE  6/23/2024     IR CVC TUNNEL REMOVAL RIGHT  5/28/2024     IR CVC TUNNEL REMOVAL RIGHT  7/3/2024     IR CVC TUNNEL REVISION RIGHT  4/15/2024     IR LOWER EXTREMITY ANGIOGRAM RIGHT  05/25/2021     IR LOWER EXTREMITY ANGIOGRAM RIGHT  10/5/2023     IR LOWER EXTREMITY ANGIOGRAM RIGHT  3/29/2024     IR OR ANGIOGRAM  6/23/2021     IR OR ANGIOGRAM  12/28/2022     IRRIGATION AND DEBRIDEMENT LOWER EXTREMITY, COMBINED Right 5/16/2024    Procedure: IRRIGATION AND DEBRIDEMENT OF RIGHT ABOVE KNEE AMPUTATION;  Surgeon: Tay Enciso MD;  Location:  OR     IRRIGATION AND DEBRIDEMENT LOWER EXTREMITY, COMBINED Right 5/20/2024    Procedure: IRRIGATION AND DEBRIDMENT RIGHT LOWER EXTREMITY;  Surgeon: José Luis Hernandez MD;  Location:  OR     LAPAROSCOPIC CHOLECYSTECTOMY N/A 09/27/2017    Procedure: LAPAROSCOPIC CHOLECYSTECTOMY;  LAPAROSCOPIC CHOLECYSTECTOMY;  Surgeon: Jacoby Tapia MD;  Location:  SD     LAPAROSCOPY DIAGNOSTIC (GENERAL) N/A  8/11/2021    Procedure: Exploratory laparoscopy,  laparoscopic lysis of adhesions, laparotomy;  Surgeon: Corina Ferreira MD;  Location:  OR     OR ANGIOGRAM, LOWER EXTREMITY Right 10/11/2023    Procedure: Or Angiogram, Lower Extremity;  Surgeon: Chadwick Lozano MD;  Location:  OR     ORTHOPEDIC SURGERY      left knee surgery     REPAIR ANEURYSM FEMORAL ARTERY Left 12/28/2022    Procedure: REPAIR LEFT FEMORAL PSEUDOANEURYSM;  Surgeon: Chadwick Lozano MD;  Location:  OR     VASCULAR SURGERY  aoto bi fem  left fem-AK pop     [facility] FABRIC WRAPPING OF ABDOMINAL ANEURYSM       [facility] NONSPECIFIC PROCEDURE  12/2000    angioplasty with stent right fem. a.     [facility] NONSPECIFIC PROCEDURE  1987    sinus surgery     [facility] NONSPECIFIC PROCEDURE  09/02/2009    Emergent left groin exploration with oversewing of bleeding angiographic site. 2. Endarterectomy of common femoral-proximal superficial femoral artery with greater saphenous vein patch angioplasty.   a. [place] of accessory right greater saphenous vein.      [facility] NONSPECIFIC PROCEDURE  08/27/2009    occluded left common iliac and external iliac arteries were successfully revascularized with stenting to 8 and 7 mm        Social History     Tobacco Use     Smoking status: Former     Current packs/day: 0.25     Average packs/day: 0.3 packs/day for 56.8 years (14.2 ttl pk-yrs)     Types: Cigarettes     Start date: 1968     Smokeless tobacco: Never     Tobacco comments:     1/2 PPD. 1-3 cigs a day   Substance Use Topics     Alcohol use: Not Currently     Comment: x1-2 yr     Family History   Problem Relation Age of Onset     Cancer Mother         bladder     Cardiovascular Father         alive,multiple heart attacks     Diabetes Maternal Grandmother      Lung Cancer Maternal Grandmother      Blood Disease Brother         clotting disorder            Medications:     Current Outpatient Medications   Medication Sig Dispense Refill     acetaminophen (TYLENOL) 500 MG  tablet Take 1-2 tablets (500-1,000 mg) by mouth every 6 hours as needed for mild pain 60 tablet 0     albuterol (PROAIR HFA/PROVENTIL HFA/VENTOLIN HFA) 108 (90 Base) MCG/ACT inhaler Inhale 2 puffs into the lungs every 4 hours as needed for shortness of breath, wheezing or cough 18 g 0     amLODIPine (NORVASC) 10 MG tablet Take 1 tablet (10 mg) by mouth daily 90 tablet 0     atenolol (TENORMIN) 25 MG tablet Take 1 tablet (25 mg) by mouth daily. 90 tablet 1     budesonide-formoterol (SYMBICORT) 160-4.5 MCG/ACT Inhaler Inhale 2 puffs into the lungs two times daily Disp 3 inhalers 30.6 g 3     cetirizine (ZYRTEC) 5 MG tablet Take 1 tablet (5 mg) by mouth daily (Patient not taking: Reported on 9/23/2024) 30 tablet 0     clopidogrel (PLAVIX) 75 MG tablet Take 1 tablet (75 mg) by mouth daily. 90 tablet 3     diphenoxylate-atropine (LOMOTIL) 2.5-0.025 MG tablet Take 1 tablet by mouth 4 times daily as needed for diarrhea 30 tablet 0     empagliflozin (JARDIANCE) 10 MG TABS tablet Take 1 tablet (10 mg) by mouth daily. 90 tablet 1     gabapentin (NEURONTIN) 100 MG capsule Take 2 capsules (200 mg) by mouth at bedtime 180 capsule 0     hydrALAZINE (APRESOLINE) 50 MG tablet Take 1 tablet (50 mg) by mouth 3 times daily.       isosorbide mononitrate (IMDUR) 60 MG 24 hr tablet Take 1 tablet (60 mg) by mouth daily. 90 tablet 3     miconazole (MICATIN) 2 % external powder Apply topically 2 times daily 50 g 0     multivitamin w/minerals (THERA-VIT-M) tablet Take 1 tablet by mouth daily. (Patient not taking: Reported on 9/23/2024) 90 tablet 3     nicotine (NICODERM CQ) 14 MG/24HR 24 hr patch Place 1 patch onto the skin every 24 hours (Patient not taking: Reported on 9/23/2024) 9 patch 0     nicotine (NICODERM CQ) 14 MG/24HR 24 hr patch Place 1 patch onto the skin daily (Patient not taking: Reported on 9/23/2024) 30 patch 0     nitroGLYcerin (NITROSTAT) 0.4 MG sublingual tablet Place 1 tablet (0.4 mg) under the tongue See Admin  "Instructions for chest pain 30 tablet 11     ondansetron (ZOFRAN ODT) 4 MG ODT tab Take 1 tablet (4 mg) by mouth every 6 hours as needed for nausea or vomiting (Patient not taking: Reported on 9/23/2024)       polyethylene glycol (MIRALAX) 17 GM/Dose powder Take 17 g by mouth daily (Patient not taking: Reported on 9/23/2024) 510 g 0     rosuvastatin (CRESTOR) 10 MG tablet Take 1 tablet (10 mg) by mouth at bedtime 30 tablet 6     saccharomyces boulardii (FLORASTOR) 250 MG capsule Take 1 capsule (250 mg) by mouth 2 times daily (Patient not taking: Reported on 9/23/2024) 60 capsule 0     silver sulfADIAZINE (SILVADENE) 1 % external cream Apply topically daily       spironolactone (ALDACTONE) 25 MG tablet Take 0.5 tablets (12.5 mg) by mouth daily. 45 tablet 1     torsemide (DEMADEX) 10 MG tablet Take 5 tablets (50 mg) by mouth daily for 30 days (Patient taking differently: Take 30 mg by mouth daily.) 150 tablet 0     wound support modular (EXPEDITE) LIQD bottle Take 60 mLs by mouth daily 2000 mL 0            Allergies:     Allergies   Allergen Reactions     Contrast Dye Anaphylaxis     Pt reported facial and throat swelling with prior CT contrast     Pantoprazole      Protonix caused diffuse edema     Chantix [Varenicline]      Terrible dreams     Gluten Meal GI Disturbance     Pt has celiac disease     Penicillins Itching and Rash              ROS:     A focused ROS is negative other than the symptoms noted above in the HPI.           Physical Examination:     VITAL SIGNS: LMP  (LMP Unknown)   BMI: Estimated body mass index is 18.67 kg/m  as calculated from the following:    Height as of 9/23/24: 1.549 m (5' 1\").    Weight as of 9/23/24: 44.8 kg (98 lb 12.8 oz).  Gait: Deferred, no prosthesis currently  Shoulder: Good AROM.  Some mild GH pain with modified robertson on right.  Strength:   Hip adduction Hip Flexion Knee Extension A. Plantarflex A. Dorsiflex   R 5 5 NA NA NA   L 5 5 5 5 2   Sensation:  Sensation to light " touch intact in the BLE  Residual right leg: well healed 2nd intention wound on anterior right residual extremity with old induration.  No current erythema or tenderness.  Left Le degree contracture of left foot.  Very mild varus ankle.  Prosthesis: None         Labs:     Lipids  Recent Labs   Lab Test 10/03/23  0941 02/15/22  0632 21  0624 20  0844   CHOL 137 124 143 127   LDL 69 61 76 62   HDL 54 48* 53 48*   TRIG 68 76 71 83     HgbA1c  Recent Labs   Lab Test 24  0959 24  1542 02/15/22  0632 20  0844   A1C 4.6 5.2 5.3 5.4            Assessment/Plan:     Shirley Hendricks is a 64 year old female with a relevant PMH of CAD, DM2, CKD, COPD, tobacco use, and depression/anxiety with peripheral artery disease with failed revascularization leading to a right transfemoral amputation on 24.  She has been to the ED and readmitted several times for acute respiratory failure.    -------------  Medical Necessity for Prosthesis    Shirley Hendricks is a 64 year old female with no cardiopulmonary, MSK or neurologic diagnosis that would limit her from successful ambulation with a lower extremity prosthesis.   She sustained a right transfemoral amputation on 24 due to peripheral vascular disease.  Her post operative course has been eventful with renal failure and repeat respiratory exacerbation as well as poor wound healing.  However, after initial debridement, her right leg surgical wound healed well.  She is back to regular exercise, is breathing well, and she is at baseline kidney function.   Her prognosis for prosthesis use is excellent.    She is currently utilizing a wheelchair and hop-walking with a walker for ambulation  She is able to perform most transfers independently, with limited space in the bathroom explaining her need for assistance with toileting and showering.  She has improved endurance for community and home ambulation.    Her functional level prior to  amputation was K2  Her expected functional potential: K2.  She should be capable of short distance but frequent ambulation and longer term standing with enough support to do functional standing activities with the prosthesis.    She has expressed a desire to ambulate with a prosthesis, is motivated to go through the fitting process, and will participate in prosthesis training.  The residual limb is in good condition and the incision is well healed with no open wound, drainage, erythema, swelling or odor.   I am recommending a new lower extremity prosthesis be fabricated.   -------------    #Right TFA  - ordered K2 right transfemoral prosthesis  - physical therapy for gait training with prosthesis  - informed of community amputee peer services  - gave information for [facility] self referral driving certification resources    #Left ankle contracture  - ordered custom AFO  - physical therapy for AFO assessment and training    #Follow up  - 3 months  - prosthesis fit, AFO success, driving status    Jacobo Robbins MD  Physical Medicine & Rehabilitation    I spent a total of 54 minutes on the date of service doing chart review, history and exam, documentation, and further activities as noted above.      Again, thank you for allowing me to participate in the care of your patient.      Sincerely,    Jacobo Robbins MD

## 2024-10-24 NOTE — PATIENT INSTRUCTIONS
I will order a K2 prosthesis for your right leg as  well as physical therapy and a custom ankle foot orthosis for your left leg to deal with the weakness and contracture.    For driving assessment, just google [employer] or follow this website: [email or web address]    Amputation Resources:     [employer]es  [email or web address] (Facebook group as well)   Main PH: [phone]  , Ryley Desiree Frederick PH: [phone], is available by phone/text 7 days/week (recommend you contact Ryley directly)     &    Amputation Coalition   www.amputee-[email or web address]   PH: [phone]  National agency where you can find support groups and find local resources.     Support Groups:   For more information, contact Josephine Lee PH: [phone], Email: [email or web address]     [facility] Diabetic Amputee Community   Mondays on Zoom @ 7-8:00 pm central time   Come talk about issues related to living with a chronic disease as well as navigating limb loss.     [facility] Wound Care, Charcot Foot, & Partial Amputation Community   The 3rd Tuesday of the month on Zoom @ 10-11:00 am central   Come discuss issues related to living with chronic wound care, Charcot foot, and partial amputations.     Balanced Grief--A NON Therapeutic Look at Grief   1st and 3rd Tuesdays on Zoom @ 10-11:00 am central   Grief is normal learn how to thrive with limb loss-- join the learning and discussion.     [facility] Amputee Community   Tuesdays on Zoom @ 7-8:00 pm central time    Come learn from various professions related to thriving with limb loss.     Balanced and Beyond-- for new amputees   (pre-surgery and beyond)   Wednesdays on Zoom @ 10-11:00 am central   Come discuss issues related to the first years with prosthetics.

## 2024-10-25 ENCOUNTER — DOCUMENTATION ONLY (OUTPATIENT)
Dept: PHYSICAL MEDICINE AND REHAB | Facility: CLINIC | Age: 64
End: 2024-10-25
Payer: COMMERCIAL

## 2024-10-25 DIAGNOSIS — Z89.611 ACQUIRED ABSENCE OF RIGHT LEG ABOVE KNEE (H): Primary | ICD-10-CM

## 2024-11-04 ENCOUNTER — MYC REFILL (OUTPATIENT)
Dept: INTERNAL MEDICINE | Facility: CLINIC | Age: 64
End: 2024-11-04
Payer: COMMERCIAL

## 2024-11-04 DIAGNOSIS — I10 BENIGN ESSENTIAL HYPERTENSION: ICD-10-CM

## 2024-11-04 DIAGNOSIS — I50.33 ACUTE ON CHRONIC HEART FAILURE WITH PRESERVED EJECTION FRACTION (H): ICD-10-CM

## 2024-11-04 DIAGNOSIS — L08.9 WOUND INFECTION: ICD-10-CM

## 2024-11-04 DIAGNOSIS — T14.8XXA WOUND INFECTION: ICD-10-CM

## 2024-11-04 RX ORDER — GABAPENTIN 100 MG/1
200 CAPSULE ORAL AT BEDTIME
Qty: 180 CAPSULE | Refills: 0 | Status: SHIPPED | OUTPATIENT
Start: 2024-11-04

## 2024-11-04 RX ORDER — AMLODIPINE BESYLATE 10 MG/1
10 TABLET ORAL DAILY
Qty: 30 TABLET | Refills: 0 | Status: SHIPPED | OUTPATIENT
Start: 2024-11-04

## 2024-11-04 NOTE — LETTER
[facility]  [address] [place] [address]  [phone]  November 13, 2024  Shirley Hendricks  [address]  [facility] [address]    Dear Shirley,    I am contacting you regarding the refill request we received for you. After reviewing your chart it looks like you are overdue for your annual and for a med check. Please call [phone] to  schedule this to continue to receive refills. If you anticipate running out before your appointment let us know and we can send in a fiona refill.       Let them know there is a note in your chart with times to be worked in to be seen.         Thank you,     [facility] nursing staff    
Aurora Roque  (PCA)  2020 11:42:35

## 2024-11-05 NOTE — TELEPHONE ENCOUNTER
Patient last seen by me in June.  Blood pressure has been elevated with last 2 visits in chart with PM&R and cardiology.  Gabapentin refilled for 3 months and amlodipine for 1 month.  Call patient and assist in scheduling appointment with me in clinic in the next 30 days for follow-up regarding blood pressure management. May use any current open appointment slot to schedule the appointment except for a preop/hospital follow-up slot  or end of day virtual appt slot.

## 2024-11-14 ENCOUNTER — PATIENT OUTREACH (OUTPATIENT)
Dept: CARE COORDINATION | Facility: CLINIC | Age: 64
End: 2024-11-14

## 2024-11-20 ENCOUNTER — APPOINTMENT (OUTPATIENT)
Dept: GENERAL RADIOLOGY | Facility: CLINIC | Age: 64
DRG: 280 | End: 2024-11-20
Attending: EMERGENCY MEDICINE
Payer: COMMERCIAL

## 2024-11-20 ENCOUNTER — APPOINTMENT (OUTPATIENT)
Dept: ULTRASOUND IMAGING | Facility: CLINIC | Age: 64
DRG: 280 | End: 2024-11-20
Attending: RADIOLOGY
Payer: COMMERCIAL

## 2024-11-20 ENCOUNTER — HOSPITAL ENCOUNTER (INPATIENT)
Facility: CLINIC | Age: 64
DRG: 280 | End: 2024-11-20
Attending: EMERGENCY MEDICINE | Admitting: STUDENT IN AN ORGANIZED HEALTH CARE EDUCATION/TRAINING PROGRAM
Payer: COMMERCIAL

## 2024-11-20 DIAGNOSIS — M15.0 PRIMARY OSTEOARTHRITIS INVOLVING MULTIPLE JOINTS: ICD-10-CM

## 2024-11-20 DIAGNOSIS — Z86.0100 HISTORY OF COLONIC POLYPS: ICD-10-CM

## 2024-11-20 DIAGNOSIS — K21.00 GASTROESOPHAGEAL REFLUX DISEASE WITH ESOPHAGITIS WITHOUT HEMORRHAGE: ICD-10-CM

## 2024-11-20 DIAGNOSIS — J44.9 CHRONIC OBSTRUCTIVE PULMONARY DISEASE, UNSPECIFIED COPD TYPE (H): ICD-10-CM

## 2024-11-20 DIAGNOSIS — I48.20 CHRONIC ATRIAL FIBRILLATION (H): ICD-10-CM

## 2024-11-20 DIAGNOSIS — K59.00 CONSTIPATION, UNSPECIFIED CONSTIPATION TYPE: ICD-10-CM

## 2024-11-20 DIAGNOSIS — N18.4 CKD (CHRONIC KIDNEY DISEASE) STAGE 4, GFR 15-29 ML/MIN (H): ICD-10-CM

## 2024-11-20 DIAGNOSIS — E87.5 HYPERKALEMIA: ICD-10-CM

## 2024-11-20 DIAGNOSIS — N18.32 STAGE 3B CHRONIC KIDNEY DISEASE (H): ICD-10-CM

## 2024-11-20 DIAGNOSIS — R21 RASH: ICD-10-CM

## 2024-11-20 DIAGNOSIS — M51.369 DDD (DEGENERATIVE DISC DISEASE), LUMBAR: ICD-10-CM

## 2024-11-20 DIAGNOSIS — C85.80 MARGINAL ZONE LYMPHOMA (H): ICD-10-CM

## 2024-11-20 DIAGNOSIS — T14.8XXA WOUND INFECTION: ICD-10-CM

## 2024-11-20 DIAGNOSIS — J90 PLEURAL EFFUSION: ICD-10-CM

## 2024-11-20 DIAGNOSIS — J98.19 TRAPPED LUNG: ICD-10-CM

## 2024-11-20 DIAGNOSIS — F17.200 TOBACCO USE DISORDER: ICD-10-CM

## 2024-11-20 DIAGNOSIS — I21.4 NSTEMI (NON-ST ELEVATED MYOCARDIAL INFARCTION) (H): ICD-10-CM

## 2024-11-20 DIAGNOSIS — M54.2 CERVICALGIA: ICD-10-CM

## 2024-11-20 DIAGNOSIS — J96.01 ACUTE RESPIRATORY FAILURE WITH HYPOXIA (H): ICD-10-CM

## 2024-11-20 DIAGNOSIS — J90 RECURRENT PLEURAL EFFUSION ON RIGHT: ICD-10-CM

## 2024-11-20 DIAGNOSIS — I50.9 ACUTE ON CHRONIC HEART FAILURE, UNSPECIFIED HEART FAILURE TYPE (H): Primary | ICD-10-CM

## 2024-11-20 DIAGNOSIS — R09.02 HYPOXIA: ICD-10-CM

## 2024-11-20 DIAGNOSIS — F41.9 ANXIETY: ICD-10-CM

## 2024-11-20 DIAGNOSIS — L08.9 WOUND INFECTION: ICD-10-CM

## 2024-11-20 DIAGNOSIS — I10 ESSENTIAL HYPERTENSION: ICD-10-CM

## 2024-11-20 DIAGNOSIS — E78.5 HYPERLIPIDEMIA LDL GOAL <70: ICD-10-CM

## 2024-11-20 DIAGNOSIS — I73.9 PAD (PERIPHERAL ARTERY DISEASE) (H): ICD-10-CM

## 2024-11-20 DIAGNOSIS — R06.02 SHORTNESS OF BREATH: ICD-10-CM

## 2024-11-20 DIAGNOSIS — I25.10 CORONARY ARTERY DISEASE INVOLVING NATIVE CORONARY ARTERY OF NATIVE HEART WITHOUT ANGINA PECTORIS: ICD-10-CM

## 2024-11-20 PROBLEM — R10.10 PAIN OF UPPER ABDOMEN: Status: ACTIVE | Noted: 2024-11-20

## 2024-11-20 PROBLEM — K90.0 CELIAC DISEASE: Status: ACTIVE | Noted: 2024-11-20

## 2024-11-20 PROBLEM — D12.2 BENIGN NEOPLASM OF ASCENDING COLON: Status: ACTIVE | Noted: 2018-10-31

## 2024-11-20 PROBLEM — I12.9 HYPERTENSIVE RENAL DISEASE: Status: ACTIVE | Noted: 2024-08-20

## 2024-11-20 PROBLEM — R91.8 MULTIPLE PULMONARY NODULES: Status: ACTIVE | Noted: 2024-11-20

## 2024-11-20 PROBLEM — K63.5 POLYP OF COLON: Status: ACTIVE | Noted: 2018-10-29

## 2024-11-20 PROBLEM — Z89.611 ACQUIRED ABSENCE OF RIGHT LEG ABOVE KNEE (H): Status: ACTIVE | Noted: 2024-04-22

## 2024-11-20 LAB
ALBUMIN SERPL BCG-MCNC: 4 G/DL (ref 3.5–5.2)
ALP SERPL-CCNC: 90 U/L (ref 40–150)
ALT SERPL W P-5'-P-CCNC: 19 U/L (ref 0–50)
ANION GAP SERPL CALCULATED.3IONS-SCNC: 18 MMOL/L (ref 7–15)
AST SERPL W P-5'-P-CCNC: 23 U/L (ref 0–45)
BASE EXCESS BLDV CALC-SCNC: -1 MMOL/L (ref -3–3)
BASE EXCESS BLDV CALC-SCNC: -1.5 MMOL/L (ref -3–3)
BASOPHILS # BLD AUTO: 0.1 10E3/UL (ref 0–0.2)
BASOPHILS NFR BLD AUTO: 1 %
BILIRUB SERPL-MCNC: 0.3 MG/DL
BUN SERPL-MCNC: 54.5 MG/DL (ref 8–23)
CALCIUM SERPL-MCNC: 9.1 MG/DL (ref 8.8–10.4)
CHLORIDE SERPL-SCNC: 94 MMOL/L (ref 98–107)
CREAT SERPL-MCNC: 1.71 MG/DL (ref 0.51–0.95)
EGFRCR SERPLBLD CKD-EPI 2021: 32 ML/MIN/1.73M2
EOSINOPHIL # BLD AUTO: 0.2 10E3/UL (ref 0–0.7)
EOSINOPHIL NFR BLD AUTO: 2 %
ERYTHROCYTE [DISTWIDTH] IN BLOOD BY AUTOMATED COUNT: 14.4 % (ref 10–15)
FLUAV RNA SPEC QL NAA+PROBE: NEGATIVE
FLUBV RNA RESP QL NAA+PROBE: NEGATIVE
GLUCOSE SERPL-MCNC: 91 MG/DL (ref 70–99)
HCO3 BLDV-SCNC: 23 MMOL/L (ref 21–28)
HCO3 BLDV-SCNC: 24 MMOL/L (ref 21–28)
HCO3 SERPL-SCNC: 21 MMOL/L (ref 22–29)
HCT VFR BLD AUTO: 40.1 % (ref 35–47)
HGB BLD-MCNC: 12.7 G/DL (ref 11.7–15.7)
IMM GRANULOCYTES # BLD: 0 10E3/UL
IMM GRANULOCYTES NFR BLD: 1 %
LACTATE BLD-SCNC: 0.6 MMOL/L
LYMPHOCYTES # BLD AUTO: 1.4 10E3/UL (ref 0.8–5.3)
LYMPHOCYTES NFR BLD AUTO: 19 %
MCH RBC QN AUTO: 27 PG (ref 26.5–33)
MCHC RBC AUTO-ENTMCNC: 31.7 G/DL (ref 31.5–36.5)
MCV RBC AUTO: 85 FL (ref 78–100)
MONOCYTES # BLD AUTO: 0.6 10E3/UL (ref 0–1.3)
MONOCYTES NFR BLD AUTO: 7 %
NEUTROPHILS # BLD AUTO: 5.5 10E3/UL (ref 1.6–8.3)
NEUTROPHILS NFR BLD AUTO: 71 %
NRBC # BLD AUTO: 0 10E3/UL
NRBC BLD AUTO-RTO: 0 /100
NT-PROBNP SERPL-MCNC: ABNORMAL PG/ML (ref 0–900)
O2/TOTAL GAS SETTING VFR VENT: 0 %
OXYHGB MFR BLDV: 83 % (ref 70–75)
PCO2 BLDV: 38 MM HG (ref 40–50)
PCO2 BLDV: 42 MM HG (ref 40–50)
PH BLDV: 7.38 [PH] (ref 7.32–7.43)
PH BLDV: 7.39 [PH] (ref 7.32–7.43)
PLATELET # BLD AUTO: 291 10E3/UL (ref 150–450)
PO2 BLDV: 30 MM HG (ref 25–47)
PO2 BLDV: 50 MM HG (ref 25–47)
POTASSIUM SERPL-SCNC: 4.3 MMOL/L (ref 3.4–5.3)
PROT SERPL-MCNC: 7.9 G/DL (ref 6.4–8.3)
RBC # BLD AUTO: 4.71 10E6/UL (ref 3.8–5.2)
RSV RNA SPEC NAA+PROBE: NEGATIVE
SAO2 % BLDV: 57 % (ref 70–75)
SAO2 % BLDV: 85.2 % (ref 70–75)
SARS-COV-2 RNA RESP QL NAA+PROBE: NEGATIVE
SODIUM SERPL-SCNC: 133 MMOL/L (ref 135–145)
TROPONIN T SERPL HS-MCNC: 87 NG/L
TROPONIN T SERPL HS-MCNC: 98 NG/L
WBC # BLD AUTO: 7.7 10E3/UL (ref 4–11)

## 2024-11-20 PROCEDURE — 96374 THER/PROPH/DIAG INJ IV PUSH: CPT

## 2024-11-20 PROCEDURE — 32555 ASPIRATE PLEURA W/ IMAGING: CPT

## 2024-11-20 PROCEDURE — 99223 1ST HOSP IP/OBS HIGH 75: CPT | Performed by: STUDENT IN AN ORGANIZED HEALTH CARE EDUCATION/TRAINING PROGRAM

## 2024-11-20 PROCEDURE — 82805 BLOOD GASES W/O2 SATURATION: CPT | Performed by: STUDENT IN AN ORGANIZED HEALTH CARE EDUCATION/TRAINING PROGRAM

## 2024-11-20 PROCEDURE — 36415 COLL VENOUS BLD VENIPUNCTURE: CPT | Performed by: EMERGENCY MEDICINE

## 2024-11-20 PROCEDURE — 272N000706 US THORACENTESIS

## 2024-11-20 PROCEDURE — 99291 CRITICAL CARE FIRST HOUR: CPT | Mod: 25

## 2024-11-20 PROCEDURE — 250N000009 HC RX 250: Performed by: EMERGENCY MEDICINE

## 2024-11-20 PROCEDURE — 71045 X-RAY EXAM CHEST 1 VIEW: CPT

## 2024-11-20 PROCEDURE — 84484 ASSAY OF TROPONIN QUANT: CPT | Performed by: EMERGENCY MEDICINE

## 2024-11-20 PROCEDURE — 36415 COLL VENOUS BLD VENIPUNCTURE: CPT | Performed by: STUDENT IN AN ORGANIZED HEALTH CARE EDUCATION/TRAINING PROGRAM

## 2024-11-20 PROCEDURE — 250N000009 HC RX 250: Performed by: RADIOLOGY

## 2024-11-20 PROCEDURE — 83880 ASSAY OF NATRIURETIC PEPTIDE: CPT | Performed by: EMERGENCY MEDICINE

## 2024-11-20 PROCEDURE — 94660 CPAP INITIATION&MGMT: CPT

## 2024-11-20 PROCEDURE — 85025 COMPLETE CBC W/AUTO DIFF WBC: CPT | Performed by: EMERGENCY MEDICINE

## 2024-11-20 PROCEDURE — 82803 BLOOD GASES ANY COMBINATION: CPT

## 2024-11-20 PROCEDURE — 84075 ASSAY ALKALINE PHOSPHATASE: CPT | Performed by: EMERGENCY MEDICINE

## 2024-11-20 PROCEDURE — 999N000157 HC STATISTIC RCP TIME EA 10 MIN

## 2024-11-20 PROCEDURE — 120N000013 HC R&B IMCU

## 2024-11-20 PROCEDURE — 94640 AIRWAY INHALATION TREATMENT: CPT

## 2024-11-20 PROCEDURE — 87040 BLOOD CULTURE FOR BACTERIA: CPT | Performed by: EMERGENCY MEDICINE

## 2024-11-20 PROCEDURE — 87637 SARSCOV2&INF A&B&RSV AMP PRB: CPT | Performed by: EMERGENCY MEDICINE

## 2024-11-20 PROCEDURE — 0W993ZZ DRAINAGE OF RIGHT PLEURAL CAVITY, PERCUTANEOUS APPROACH: ICD-10-PCS | Performed by: RADIOLOGY

## 2024-11-20 PROCEDURE — 36600 WITHDRAWAL OF ARTERIAL BLOOD: CPT

## 2024-11-20 PROCEDURE — 250N000011 HC RX IP 250 OP 636: Performed by: EMERGENCY MEDICINE

## 2024-11-20 PROCEDURE — 5A09357 ASSISTANCE WITH RESPIRATORY VENTILATION, LESS THAN 24 CONSECUTIVE HOURS, CONTINUOUS POSITIVE AIRWAY PRESSURE: ICD-10-PCS | Performed by: EMERGENCY MEDICINE

## 2024-11-20 PROCEDURE — 250N000013 HC RX MED GY IP 250 OP 250 PS 637: Performed by: STUDENT IN AN ORGANIZED HEALTH CARE EDUCATION/TRAINING PROGRAM

## 2024-11-20 RX ORDER — CALCIUM CARBONATE 500 MG/1
1000 TABLET, CHEWABLE ORAL 4 TIMES DAILY PRN
Status: DISCONTINUED | OUTPATIENT
Start: 2024-11-20 | End: 2024-12-13 | Stop reason: HOSPADM

## 2024-11-20 RX ORDER — CLOPIDOGREL BISULFATE 75 MG/1
75 TABLET ORAL DAILY
Status: DISCONTINUED | OUTPATIENT
Start: 2024-11-21 | End: 2024-12-13 | Stop reason: HOSPADM

## 2024-11-20 RX ORDER — AMOXICILLIN 250 MG
2 CAPSULE ORAL 2 TIMES DAILY PRN
Status: DISCONTINUED | OUTPATIENT
Start: 2024-11-20 | End: 2024-12-13 | Stop reason: HOSPADM

## 2024-11-20 RX ORDER — ACETAMINOPHEN 500 MG
500-1000 TABLET ORAL EVERY 6 HOURS PRN
Status: DISCONTINUED | OUTPATIENT
Start: 2024-11-20 | End: 2024-11-20

## 2024-11-20 RX ORDER — PROCHLORPERAZINE MALEATE 5 MG/1
5 TABLET ORAL EVERY 6 HOURS PRN
Status: DISCONTINUED | OUTPATIENT
Start: 2024-11-20 | End: 2024-12-13 | Stop reason: HOSPADM

## 2024-11-20 RX ORDER — AMOXICILLIN 250 MG
1 CAPSULE ORAL 2 TIMES DAILY PRN
Status: DISCONTINUED | OUTPATIENT
Start: 2024-11-20 | End: 2024-12-13 | Stop reason: HOSPADM

## 2024-11-20 RX ORDER — GABAPENTIN 100 MG/1
200 CAPSULE ORAL AT BEDTIME
Status: DISCONTINUED | OUTPATIENT
Start: 2024-11-20 | End: 2024-12-13 | Stop reason: HOSPADM

## 2024-11-20 RX ORDER — LIDOCAINE HYDROCHLORIDE 10 MG/ML
10 INJECTION, SOLUTION EPIDURAL; INFILTRATION; INTRACAUDAL; PERINEURAL ONCE
Status: COMPLETED | OUTPATIENT
Start: 2024-11-20 | End: 2024-11-20

## 2024-11-20 RX ORDER — IPRATROPIUM BROMIDE AND ALBUTEROL SULFATE 2.5; .5 MG/3ML; MG/3ML
3 SOLUTION RESPIRATORY (INHALATION) ONCE
Status: COMPLETED | OUTPATIENT
Start: 2024-11-20 | End: 2024-11-20

## 2024-11-20 RX ORDER — HYDRALAZINE HYDROCHLORIDE 50 MG/1
50 TABLET, FILM COATED ORAL 3 TIMES DAILY
Status: DISCONTINUED | OUTPATIENT
Start: 2024-11-20 | End: 2024-11-24

## 2024-11-20 RX ORDER — FLUTICASONE FUROATE AND VILANTEROL 200; 25 UG/1; UG/1
1 POWDER RESPIRATORY (INHALATION) DAILY
Status: DISCONTINUED | OUTPATIENT
Start: 2024-11-21 | End: 2024-12-13 | Stop reason: HOSPADM

## 2024-11-20 RX ORDER — LABETALOL HYDROCHLORIDE 5 MG/ML
10 INJECTION, SOLUTION INTRAVENOUS
Status: COMPLETED | OUTPATIENT
Start: 2024-11-20 | End: 2024-11-21

## 2024-11-20 RX ORDER — ROSUVASTATIN CALCIUM 10 MG/1
10 TABLET, COATED ORAL AT BEDTIME
Status: DISCONTINUED | OUTPATIENT
Start: 2024-11-20 | End: 2024-12-13 | Stop reason: HOSPADM

## 2024-11-20 RX ORDER — ALBUTEROL SULFATE 90 UG/1
2 INHALANT RESPIRATORY (INHALATION) EVERY 4 HOURS PRN
Status: DISCONTINUED | OUTPATIENT
Start: 2024-11-20 | End: 2024-12-13 | Stop reason: HOSPADM

## 2024-11-20 RX ORDER — ATENOLOL 25 MG/1
25 TABLET ORAL DAILY
Status: DISCONTINUED | OUTPATIENT
Start: 2024-11-21 | End: 2024-11-21

## 2024-11-20 RX ORDER — FUROSEMIDE 10 MG/ML
60 INJECTION INTRAMUSCULAR; INTRAVENOUS ONCE
Status: COMPLETED | OUTPATIENT
Start: 2024-11-20 | End: 2024-11-20

## 2024-11-20 RX ORDER — AMLODIPINE BESYLATE 10 MG/1
10 TABLET ORAL DAILY
Status: DISCONTINUED | OUTPATIENT
Start: 2024-11-21 | End: 2024-12-13 | Stop reason: HOSPADM

## 2024-11-20 RX ORDER — ACETAMINOPHEN 500 MG
1000 TABLET ORAL EVERY 6 HOURS PRN
Status: DISCONTINUED | OUTPATIENT
Start: 2024-11-20 | End: 2024-12-13 | Stop reason: HOSPADM

## 2024-11-20 RX ORDER — LIDOCAINE HYDROCHLORIDE 10 MG/ML
5 INJECTION, SOLUTION EPIDURAL; INFILTRATION; INTRACAUDAL; PERINEURAL ONCE
Status: COMPLETED | OUTPATIENT
Start: 2024-11-20 | End: 2024-11-20

## 2024-11-20 RX ORDER — LIDOCAINE 40 MG/G
CREAM TOPICAL
Status: DISCONTINUED | OUTPATIENT
Start: 2024-11-20 | End: 2024-11-22

## 2024-11-20 RX ORDER — TORSEMIDE 10 MG/1
30 TABLET ORAL DAILY
Status: ON HOLD | COMMUNITY
End: 2024-12-03

## 2024-11-20 RX ORDER — LIDOCAINE 40 MG/G
CREAM TOPICAL
Status: DISCONTINUED | OUTPATIENT
Start: 2024-11-20 | End: 2024-12-04

## 2024-11-20 RX ORDER — ISOSORBIDE MONONITRATE 30 MG/1
60 TABLET, EXTENDED RELEASE ORAL DAILY
Status: DISCONTINUED | OUTPATIENT
Start: 2024-11-21 | End: 2024-11-25

## 2024-11-20 RX ORDER — NITROGLYCERIN 0.4 MG/1
0.4 TABLET SUBLINGUAL EVERY 5 MIN PRN
Status: DISCONTINUED | OUTPATIENT
Start: 2024-11-20 | End: 2024-12-13 | Stop reason: HOSPADM

## 2024-11-20 RX ORDER — CARBOXYMETHYLCELLULOSE SODIUM 5 MG/ML
1 SOLUTION/ DROPS OPHTHALMIC
Status: DISCONTINUED | OUTPATIENT
Start: 2024-11-20 | End: 2024-11-30

## 2024-11-20 RX ORDER — IPRATROPIUM BROMIDE AND ALBUTEROL SULFATE 2.5; .5 MG/3ML; MG/3ML
3 SOLUTION RESPIRATORY (INHALATION)
Status: DISCONTINUED | OUTPATIENT
Start: 2024-11-20 | End: 2024-11-20

## 2024-11-20 RX ORDER — LIDOCAINE 40 MG/G
CREAM TOPICAL
Status: DISCONTINUED | OUTPATIENT
Start: 2024-11-20 | End: 2024-11-20

## 2024-11-20 RX ORDER — FUROSEMIDE 10 MG/ML
60 INJECTION INTRAMUSCULAR; INTRAVENOUS
Status: DISCONTINUED | OUTPATIENT
Start: 2024-11-21 | End: 2024-11-23

## 2024-11-20 RX ADMIN — LIDOCAINE HYDROCHLORIDE 5 ML: 10 INJECTION, SOLUTION EPIDURAL; INFILTRATION; INTRACAUDAL; PERINEURAL at 18:05

## 2024-11-20 RX ADMIN — FUROSEMIDE 60 MG: 10 INJECTION, SOLUTION INTRAMUSCULAR; INTRAVENOUS at 16:36

## 2024-11-20 RX ADMIN — IPRATROPIUM BROMIDE AND ALBUTEROL SULFATE 3 ML: .5; 3 SOLUTION RESPIRATORY (INHALATION) at 15:57

## 2024-11-20 RX ADMIN — HYDRALAZINE HYDROCHLORIDE 50 MG: 50 TABLET ORAL at 22:14

## 2024-11-20 RX ADMIN — GABAPENTIN 200 MG: 100 CAPSULE ORAL at 22:15

## 2024-11-20 RX ADMIN — ROSUVASTATIN CALCIUM 10 MG: 10 TABLET, FILM COATED ORAL at 22:15

## 2024-11-20 RX ADMIN — LIDOCAINE HYDROCHLORIDE 10 ML: 10 INJECTION, SOLUTION EPIDURAL; INFILTRATION; INTRACAUDAL; PERINEURAL at 18:05

## 2024-11-20 RX ADMIN — NITROGLYCERIN 0.4 MG: 0.4 TABLET SUBLINGUAL at 21:19

## 2024-11-20 ASSESSMENT — ACTIVITIES OF DAILY LIVING (ADL)
ADLS_ACUITY_SCORE: 0

## 2024-11-20 NOTE — Clinical Note
Hemodynamic equipment used: 5 lead ECG, UzabaseK With 3 Leads, Machine BP Cuff and pulse oximeter probe.

## 2024-11-20 NOTE — ED NOTES
Bed: ED07  Expected date:   Expected time:   Means of arrival:   Comments:  A 534 64 F SOB SOPD worsening since yesterday 206/95 duoneb running work of breathing improving 1609

## 2024-11-20 NOTE — ED PROVIDER NOTES
"  Emergency Department Note      History of Present Illness     Chief Complaint   Breathing Problem    HPI   Shirley Hendricks is a 64 year old female, on Plavix, with a history of COPD, hypertension, CHF, peripheral artery disease, NSTEMI, stage 4 CKD, and tobacco use who presents via EMS for evaluation of shortness of breath. The patient states that she has been short of breath for two days and feels like she was \"low on oxygen\" today. Adds that she has some left lower extremity edema. EMS reports that she was hypoxic in the 80s on room air, but after being started on a non rebreather she increased to 95% oxygen saturation. She was given one duo neb en route. She is still smoking cigarettes. Denies use of supplemental oxygen at baseline. Denies abdominal pain, nausea, vomiting, diarrhea, constipation, fever, and cough.     Independent Historian   EMS as detailed above.    Review of External Notes   I reviewed patient's discharge summary from 8/7/2024.  Patient was admitted for acute hypoxic respiratory failure due to an acute on chronic diastolic heart failure exacerbation.  She did have a moderate to severe left pleural effusion during that hospitalization that required thoracentesis.  There is a type II NSTEMI due to demand from heart failure exacerbation.  Underlying hypertension and hyperlipidemia.  Patient also has peripheral vascular disease with a history of a 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 2024.  Patient discharged on Bumex 1 mg 3 times daily for 14 days.    Past Medical History     Medical History and Problem List   Anxiety   Charcot-Breonna-Tooth disease  COPD  Clots  CHF  GERD  Hypertension  Hyperlipidemia  Lupus  Depression   Peripheral artery disease  Osteoarthritis  SBO  SVT  Asthma  Vitamin C deficiency   CAD  Tobacco use   NSETMI   Stage 4 CKD    Medications   Albuterol  Amlodipine  Atenolol  Plavix  Jardiance  Gabapentin  Imdur   Nitroglycerin  Rosuvastatin  Hydralazine     Surgical History   AKA, right " "  Parotid biopsy, right   Femoral artery bypass, right   Cardiac catheterization   Salpingectomy, left  Carotid endarterectomy, bilateral   I&D, right lower extremity   Laparoscopic cholecystectomy     Physical Exam     Patient Vitals for the past 24 hrs:   BP Temp Temp src Pulse Resp SpO2 Height Weight   11/20/24 1618 (!) 189/90 -- -- 63 (!) 39 91 % -- --   11/20/24 1614 -- -- -- 63 21 92 % -- --   11/20/24 1559 -- -- -- 65 28 90 % -- --   11/20/24 1544 -- -- -- 61 24 (!) 84 % -- --   11/20/24 1529 -- -- -- 63 25 (!) 88 % -- --   11/20/24 1524 -- 97.8  F (36.6  C) Oral -- -- -- -- --   11/20/24 1453 -- -- -- -- -- -- 1.575 m (5' 2\") 45.4 kg (100 lb)   11/20/24 1444 (!) 198/88 -- -- 62 (!) 32 94 % -- --     Physical Exam  General: Alert, appears well-developed and well-nourished. Cooperative.     In mild distress  HEENT:  Head:  Atraumatic  Ears:  External ears are normal  Mouth/Throat:  Oropharynx is without erythema or exudate and mucous membranes are moist.   Eyes:   Conjunctivae normal and EOM are normal. No scleral icterus.  CV:  Tachycardic rate, regular rhythm, normal heart sounds and radial and dorsalis pedis pulses are 2+ and symmetric.  No murmur.  Resp:  Breath sounds are distant bilaterally with faint expiratory wheezing in upper lobes bilaterally.  Mild increased work of breathing, but appears comfortable on oximask.      No retractions or accessory muscle use  GI:  Abdomen is soft, no distension, no tenderness. No rebound or guarding.  MS:  Normal range of motion.  Right AKA.  Left lower extremity 2+ edema.    Back atraumatic.    No midline cervical, thoracic, or lumbar tenderness  Skin:  Warm and dry.  No rash or lesions noted.  Neuro:   Alert. Normal strength.  GCS: 15  Psych: Normal mood and affect.    Diagnostics     Lab Results   Labs Ordered and Resulted from Time of ED Arrival to Time of ED Departure   COMPREHENSIVE METABOLIC PANEL - Abnormal       Result Value    Sodium 133 (*)     Potassium " 4.3      Carbon Dioxide (CO2) 21 (*)     Anion Gap 18 (*)     Urea Nitrogen 54.5 (*)     Creatinine 1.71 (*)     GFR Estimate 32 (*)     Calcium 9.1      Chloride 94 (*)     Glucose 91      Alkaline Phosphatase 90      AST 23      ALT 19      Protein Total 7.9      Albumin 4.0      Bilirubin Total 0.3     TROPONIN T, HIGH SENSITIVITY - Abnormal    Troponin T, High Sensitivity 98 (*)    NT PROBNP INPATIENT - Abnormal    N terminal Pro BNP Inpatient 26,420 (*)    ISTAT GASES LACTATE VENOUS POCT - Abnormal    Lactic Acid POCT 0.6      Bicarbonate Venous POCT 24      O2 Sat, Venous POCT 57 (*)     pCO2 Venous POCT 42      pH Venous POCT 7.38      pO2 Venous POCT 30      Base Excess/Deficit (+/-) POCT -1.0     INFLUENZA A/B, RSV AND SARS-COV2 PCR - Normal    Influenza A PCR Negative      Influenza B PCR Negative      RSV PCR Negative      SARS CoV2 PCR Negative     CBC WITH PLATELETS AND DIFFERENTIAL    WBC Count 7.7      RBC Count 4.71      Hemoglobin 12.7      Hematocrit 40.1      MCV 85      MCH 27.0      MCHC 31.7      RDW 14.4      Platelet Count 291      % Neutrophils 71      % Lymphocytes 19      % Monocytes 7      % Eosinophils 2      % Basophils 1      % Immature Granulocytes 1      NRBCs per 100 WBC 0      Absolute Neutrophils 5.5      Absolute Lymphocytes 1.4      Absolute Monocytes 0.6      Absolute Eosinophils 0.2      Absolute Basophils 0.1      Absolute Immature Granulocytes 0.0      Absolute NRBCs 0.0     TROPONIN T, HIGH SENSITIVITY   BLOOD CULTURE       Imaging   XR Chest Port 1 View   Final Result   IMPRESSION: Large right, moderate sized left pleural effusions with compressive atelectasis. Diffuse interstitial opacities suggest pulmonary edema. No pneumothorax. Cardiac silhouette and mediastinal contours are unchanged where seen. Aortic arch    calcifications.      US Thoracentesis    (Results Pending)       EKG   ECG taken at 1446, ECG read at 1500  Normal sinus rhythm   Minimal voltage criteria for  LVH, may be normal variant ([place] product)  Anterior infarct, age undetermined   No change as compared to prior, dated 8/1/24.  Rate 61 bpm. VT interval 144 ms. QRS duration 84 ms. QT/QTc 436/438 ms. P-R-T axes 51 29 69.    Independent Interpretation   CXR: bilateral pleural effusion.    ED Course      Medications Administered   Medications   sodium chloride (PF) 0.9% PF flush 3 mL (has no administration in time range)   sodium chloride (PF) 0.9% PF flush 3 mL (has no administration in time range)   ipratropium - albuterol 0.5 mg/2.5 mg/3 mL (DUONEB) neb solution 3 mL (has no administration in time range)   ipratropium - albuterol 0.5 mg/2.5 mg/3 mL (DUONEB) neb solution 3 mL (3 mLs Nebulization $Given 11/20/24 1557)   furosemide (LASIX) injection 60 mg (60 mg Intravenous $Given 11/20/24 1636)       Procedures   Procedures     Discussion of Management   Admitting Hospitalist, Dr. White  Radiologist,      ED Course   ED Course as of 11/20/24 1737 Wed Nov 20, 2024   1456 I obtained history and examined the patient as noted above.    1611 I rechecked and updated the patient.    1703 IR. Coming in for other procedure. Will do thoracentesis.    1704 I spoke with Dr. White of the hospitalist service regarding admission.    1724 I rechecked and updated the patient.    1725 Page RT to start BiPAP.       Additional Documentation  None    Medical Decision Making / Diagnosis     CMS Diagnoses: None    MIPS       None    MDM   Shirley Cammy Hendricks is a 64 year old female with a history of CHF and COPD who presents with 2 days of progressive shortness of breath.  Patient does not use supplemental oxygen at home.  Patient initially was hypoxic in the low 80s for EMS and so had supplemental oxygen applied prehospital.  On arrival here patient has airflow to bilateral lungs although some faint expiratory wheezing.  She received a DuoNeb out of consideration of potential COPD exacerbation initially.  I do have higher suspicion  for potential CHF as she does have pitting edema in the left lower extremity and does have a prior hospitalization due to CHF with bilateral effusions necessitating thoracenteses.  Airflow to bilateral lungs although some faint expiratory wheezing.  She received a DuoNeb out of consideration of potential COPD exacerbation initially.  I do have higher suspicion for potential CHF as she does have pitting edema in the left lower extremity and does have a prior hospitalization due to CHF with bilateral effusions necessitating thoracenteses.  Patient did have a bedside chest x-ray which revealed bilateral effusions with right greater than left.  There is also diffuse interstitial opacities likely suggestive of pulmonary edema.  Patient does not have significant increased work of breathing although has remained relatively hypoxic in the mid 80s even with supplemental oxygen.  She has been moderately hypertensive here as well in the emergency department.  We provided 60 mg of IV Lasix.  I spoke with interventional radiology who agreed to perform a right sided thoracentesis to help with oxygenation and ease of breathing for the patient.  Patient has required intermittent BiPAP during her time in the emergency department given persistent hypoxia.  I did note that her kidney function is slightly off baseline with elevation to 1.71 creatinine today.  Patient's electrolytes appear baseline for the patient.  Thankfully her lactate within normal range no significant CO2 retention on initial arrival.  Influenza, COVID, RSV is negative.  CBC unremarkable.  Patient case was discussed with Dr. White of the hospital service who agreed to admit [facility] admission    Critical Care time was 45 minutes for this patient excluding procedures.    Disposition   The patient was admitted to the hospital.     Diagnosis     ICD-10-CM    1. Acute on chronic heart failure, unspecified heart failure type (H)  I50.9       2. Acute respiratory failure with  hypoxia (H)  J96.01       3. Shortness of breath  R06.02          Scribe Disclosure:  I, Gala Castellanos, am serving as a scribe at 3:02 PM on 11/20/2024 to document services personally performed by Dougie Perez MD based on my observations and the provider's statements to me.        Dougie Perez MD  11/20/24 2013
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
## 2024-11-21 ENCOUNTER — APPOINTMENT (OUTPATIENT)
Dept: ULTRASOUND IMAGING | Facility: CLINIC | Age: 64
DRG: 280 | End: 2024-11-21
Attending: INTERNAL MEDICINE
Payer: COMMERCIAL

## 2024-11-21 ENCOUNTER — APPOINTMENT (OUTPATIENT)
Dept: GENERAL RADIOLOGY | Facility: CLINIC | Age: 64
DRG: 280 | End: 2024-11-21
Attending: STUDENT IN AN ORGANIZED HEALTH CARE EDUCATION/TRAINING PROGRAM
Payer: COMMERCIAL

## 2024-11-21 ENCOUNTER — APPOINTMENT (OUTPATIENT)
Dept: CT IMAGING | Facility: CLINIC | Age: 64
DRG: 280 | End: 2024-11-21
Attending: INTERNAL MEDICINE
Payer: COMMERCIAL

## 2024-11-21 ENCOUNTER — APPOINTMENT (OUTPATIENT)
Dept: ULTRASOUND IMAGING | Facility: CLINIC | Age: 64
DRG: 280 | End: 2024-11-21
Attending: STUDENT IN AN ORGANIZED HEALTH CARE EDUCATION/TRAINING PROGRAM
Payer: COMMERCIAL

## 2024-11-21 VITALS
BODY MASS INDEX: 18.4 KG/M2 | SYSTOLIC BLOOD PRESSURE: 172 MMHG | OXYGEN SATURATION: 99 % | TEMPERATURE: 98.1 F | DIASTOLIC BLOOD PRESSURE: 77 MMHG | HEIGHT: 62 IN | HEART RATE: 60 BPM | RESPIRATION RATE: 23 BRPM | WEIGHT: 100 LBS

## 2024-11-21 LAB
% LINING CELLS, BODY FLUID: 6 %
ALBUMIN SERPL BCG-MCNC: 3 G/DL (ref 3.5–5.2)
ALP SERPL-CCNC: 66 U/L (ref 40–150)
ALT SERPL W P-5'-P-CCNC: 15 U/L (ref 0–50)
ANION GAP SERPL CALCULATED.3IONS-SCNC: 12 MMOL/L (ref 7–15)
APPEARANCE FLD: CLEAR
AST SERPL W P-5'-P-CCNC: 16 U/L (ref 0–45)
ATRIAL RATE - MUSE: 129 BPM
BACTERIA BLD CULT: NORMAL
BASE EXCESS BLDV CALC-SCNC: -2.5 MMOL/L (ref -3–3)
BILIRUB SERPL-MCNC: 0.2 MG/DL
BUN SERPL-MCNC: 56.5 MG/DL (ref 8–23)
CALCIUM SERPL-MCNC: 8.2 MG/DL (ref 8.8–10.4)
CELL COUNT BODY FLUID SOURCE: NORMAL
CHLORIDE SERPL-SCNC: 99 MMOL/L (ref 98–107)
COLOR FLD: YELLOW
CREAT SERPL-MCNC: 2.02 MG/DL (ref 0.51–0.95)
CRP SERPL-MCNC: 61.97 MG/L
DIASTOLIC BLOOD PRESSURE - MUSE: NORMAL MMHG
EGFRCR SERPLBLD CKD-EPI 2021: 27 ML/MIN/1.73M2
EOSINOPHIL NFR FLD MANUAL: 2 %
ERYTHROCYTE [DISTWIDTH] IN BLOOD BY AUTOMATED COUNT: 14.6 % (ref 10–15)
ERYTHROCYTE [DISTWIDTH] IN BLOOD BY AUTOMATED COUNT: 14.7 % (ref 10–15)
GLUCOSE BLDC GLUCOMTR-MCNC: 95 MG/DL (ref 70–99)
GLUCOSE BODY FLUID SOURCE: NORMAL
GLUCOSE FLD-MCNC: 88 MG/DL
GLUCOSE SERPL-MCNC: 81 MG/DL (ref 70–99)
GRAM STAIN RESULT: NORMAL
GRAM STAIN RESULT: NORMAL
HCO3 BLDV-SCNC: 24 MMOL/L (ref 21–28)
HCO3 SERPL-SCNC: 20 MMOL/L (ref 22–29)
HCT VFR BLD AUTO: 33 % (ref 35–47)
HCT VFR BLD AUTO: 39.6 % (ref 35–47)
HGB BLD-MCNC: 10.8 G/DL (ref 11.7–15.7)
HGB BLD-MCNC: 13.1 G/DL (ref 11.7–15.7)
INTERPRETATION ECG - MUSE: NORMAL
LD BODY BODY FLUID SOURCE: NORMAL
LDH FLD L TO P-CCNC: 62 U/L
LDH SERPL L TO P-CCNC: 161 U/L (ref 0–250)
LYMPHOCYTES NFR FLD MANUAL: 36 %
MAGNESIUM SERPL-MCNC: 2.2 MG/DL (ref 1.7–2.3)
MCH RBC QN AUTO: 27.2 PG (ref 26.5–33)
MCH RBC QN AUTO: 27.7 PG (ref 26.5–33)
MCHC RBC AUTO-ENTMCNC: 32.7 G/DL (ref 31.5–36.5)
MCHC RBC AUTO-ENTMCNC: 33.1 G/DL (ref 31.5–36.5)
MCV RBC AUTO: 82 FL (ref 78–100)
MCV RBC AUTO: 85 FL (ref 78–100)
MONOS+MACROS NFR FLD MANUAL: 22 %
NEUTS BAND NFR FLD MANUAL: 34 %
O2/TOTAL GAS SETTING VFR VENT: 30 %
OXYHGB MFR BLDV: 85 % (ref 70–75)
P AXIS - MUSE: NORMAL DEGREES
PCO2 BLDV: 48 MM HG (ref 40–50)
PH BLDV: 7.31 [PH] (ref 7.32–7.43)
PLATELET # BLD AUTO: 224 10E3/UL (ref 150–450)
PLATELET # BLD AUTO: 241 10E3/UL (ref 150–450)
PO2 BLDV: 58 MM HG (ref 25–47)
POTASSIUM SERPL-SCNC: 4.1 MMOL/L (ref 3.4–5.3)
PR INTERVAL - MUSE: NORMAL MS
PROT FLD-MCNC: 2.6 G/DL
PROT SERPL-MCNC: 5.7 G/DL (ref 6.4–8.3)
PROT SERPL-MCNC: 6.7 G/DL (ref 6.4–8.3)
PROTEIN BODY FLUID SOURCE: NORMAL
QRS DURATION - MUSE: 86 MS
QT - MUSE: 332 MS
QTC - MUSE: 453 MS
R AXIS - MUSE: -33 DEGREES
RBC # BLD AUTO: 3.9 10E6/UL (ref 3.8–5.2)
RBC # BLD AUTO: 4.81 10E6/UL (ref 3.8–5.2)
SAO2 % BLDV: 86.7 % (ref 70–75)
SODIUM SERPL-SCNC: 131 MMOL/L (ref 135–145)
SYSTOLIC BLOOD PRESSURE - MUSE: NORMAL MMHG
T AXIS - MUSE: 71 DEGREES
VENTRICULAR RATE- MUSE: 112 BPM
WBC # BLD AUTO: 6.7 10E3/UL (ref 4–11)
WBC # BLD AUTO: 8.9 10E3/UL (ref 4–11)
WBC # FLD AUTO: 74 /UL

## 2024-11-21 PROCEDURE — 250N000013 HC RX MED GY IP 250 OP 250 PS 637: Performed by: STUDENT IN AN ORGANIZED HEALTH CARE EDUCATION/TRAINING PROGRAM

## 2024-11-21 PROCEDURE — 87070 CULTURE OTHR SPECIMN AEROBIC: CPT | Performed by: INTERNAL MEDICINE

## 2024-11-21 PROCEDURE — 93005 ELECTROCARDIOGRAM TRACING: CPT

## 2024-11-21 PROCEDURE — 32555 ASPIRATE PLEURA W/ IMAGING: CPT

## 2024-11-21 PROCEDURE — 85041 AUTOMATED RBC COUNT: CPT | Performed by: STUDENT IN AN ORGANIZED HEALTH CARE EDUCATION/TRAINING PROGRAM

## 2024-11-21 PROCEDURE — 36415 COLL VENOUS BLD VENIPUNCTURE: CPT | Performed by: STUDENT IN AN ORGANIZED HEALTH CARE EDUCATION/TRAINING PROGRAM

## 2024-11-21 PROCEDURE — 83615 LACTATE (LD) (LDH) ENZYME: CPT | Performed by: INTERNAL MEDICINE

## 2024-11-21 PROCEDURE — 87015 SPECIMEN INFECT AGNT CONCNTJ: CPT | Performed by: INTERNAL MEDICINE

## 2024-11-21 PROCEDURE — 36415 COLL VENOUS BLD VENIPUNCTURE: CPT | Performed by: INTERNAL MEDICINE

## 2024-11-21 PROCEDURE — 85018 HEMOGLOBIN: CPT | Performed by: STUDENT IN AN ORGANIZED HEALTH CARE EDUCATION/TRAINING PROGRAM

## 2024-11-21 PROCEDURE — 84157 ASSAY OF PROTEIN OTHER: CPT | Performed by: INTERNAL MEDICINE

## 2024-11-21 PROCEDURE — 82805 BLOOD GASES W/O2 SATURATION: CPT | Performed by: STUDENT IN AN ORGANIZED HEALTH CARE EDUCATION/TRAINING PROGRAM

## 2024-11-21 PROCEDURE — 88305 TISSUE EXAM BY PATHOLOGIST: CPT | Mod: TC | Performed by: INTERNAL MEDICINE

## 2024-11-21 PROCEDURE — 82247 BILIRUBIN TOTAL: CPT | Performed by: STUDENT IN AN ORGANIZED HEALTH CARE EDUCATION/TRAINING PROGRAM

## 2024-11-21 PROCEDURE — 99223 1ST HOSP IP/OBS HIGH 75: CPT | Performed by: INTERNAL MEDICINE

## 2024-11-21 PROCEDURE — 93010 ELECTROCARDIOGRAM REPORT: CPT | Performed by: INTERNAL MEDICINE

## 2024-11-21 PROCEDURE — 71045 X-RAY EXAM CHEST 1 VIEW: CPT

## 2024-11-21 PROCEDURE — 272N000706 US THORACENTESIS

## 2024-11-21 PROCEDURE — 89050 BODY FLUID CELL COUNT: CPT | Performed by: INTERNAL MEDICINE

## 2024-11-21 PROCEDURE — 86140 C-REACTIVE PROTEIN: CPT | Performed by: STUDENT IN AN ORGANIZED HEALTH CARE EDUCATION/TRAINING PROGRAM

## 2024-11-21 PROCEDURE — 84450 TRANSFERASE (AST) (SGOT): CPT | Performed by: STUDENT IN AN ORGANIZED HEALTH CARE EDUCATION/TRAINING PROGRAM

## 2024-11-21 PROCEDURE — 84155 ASSAY OF PROTEIN SERUM: CPT | Performed by: INTERNAL MEDICINE

## 2024-11-21 PROCEDURE — 88305 TISSUE EXAM BY PATHOLOGIST: CPT | Mod: 26 | Performed by: PATHOLOGY

## 2024-11-21 PROCEDURE — 0W9B3ZZ DRAINAGE OF LEFT PLEURAL CAVITY, PERCUTANEOUS APPROACH: ICD-10-PCS | Performed by: STUDENT IN AN ORGANIZED HEALTH CARE EDUCATION/TRAINING PROGRAM

## 2024-11-21 PROCEDURE — 999N000128 HC STATISTIC PERIPHERAL IV START W/O US GUIDANCE

## 2024-11-21 PROCEDURE — 999N000157 HC STATISTIC RCP TIME EA 10 MIN

## 2024-11-21 PROCEDURE — 250N000011 HC RX IP 250 OP 636: Performed by: INTERNAL MEDICINE

## 2024-11-21 PROCEDURE — 87205 SMEAR GRAM STAIN: CPT | Performed by: INTERNAL MEDICINE

## 2024-11-21 PROCEDURE — 84132 ASSAY OF SERUM POTASSIUM: CPT | Performed by: STUDENT IN AN ORGANIZED HEALTH CARE EDUCATION/TRAINING PROGRAM

## 2024-11-21 PROCEDURE — 93970 EXTREMITY STUDY: CPT

## 2024-11-21 PROCEDURE — 120N000013 HC R&B IMCU

## 2024-11-21 PROCEDURE — 82945 GLUCOSE OTHER FLUID: CPT | Performed by: INTERNAL MEDICINE

## 2024-11-21 PROCEDURE — 99232 SBSQ HOSP IP/OBS MODERATE 35: CPT | Performed by: STUDENT IN AN ORGANIZED HEALTH CARE EDUCATION/TRAINING PROGRAM

## 2024-11-21 PROCEDURE — 71250 CT THORAX DX C-: CPT

## 2024-11-21 PROCEDURE — 83735 ASSAY OF MAGNESIUM: CPT | Performed by: STUDENT IN AN ORGANIZED HEALTH CARE EDUCATION/TRAINING PROGRAM

## 2024-11-21 PROCEDURE — 250N000011 HC RX IP 250 OP 636: Performed by: STUDENT IN AN ORGANIZED HEALTH CARE EDUCATION/TRAINING PROGRAM

## 2024-11-21 RX ORDER — DIPHENHYDRAMINE HYDROCHLORIDE, ZINC ACETATE 2; .1 G/100G; G/100G
CREAM TOPICAL 3 TIMES DAILY PRN
Status: DISCONTINUED | OUTPATIENT
Start: 2024-11-21 | End: 2024-12-13 | Stop reason: HOSPADM

## 2024-11-21 RX ORDER — HEPARIN SODIUM 10000 [USP'U]/100ML
0-5000 INJECTION, SOLUTION INTRAVENOUS CONTINUOUS
Status: DISCONTINUED | OUTPATIENT
Start: 2024-11-21 | End: 2024-11-23

## 2024-11-21 RX ORDER — AZITHROMYCIN MONOHYDRATE 500 MG/5ML
500 INJECTION, POWDER, LYOPHILIZED, FOR SOLUTION INTRAVENOUS ONCE
Status: COMPLETED | OUTPATIENT
Start: 2024-11-21 | End: 2024-11-21

## 2024-11-21 RX ORDER — NICOTINE 21 MG/24HR
1 PATCH, TRANSDERMAL 24 HOURS TRANSDERMAL DAILY
Status: DISCONTINUED | OUTPATIENT
Start: 2024-11-21 | End: 2024-12-13 | Stop reason: HOSPADM

## 2024-11-21 RX ORDER — CEFTRIAXONE 2 G/1
2 INJECTION, POWDER, FOR SOLUTION INTRAMUSCULAR; INTRAVENOUS EVERY 24 HOURS
Status: DISCONTINUED | OUTPATIENT
Start: 2024-11-21 | End: 2024-11-24

## 2024-11-21 RX ORDER — LIDOCAINE HYDROCHLORIDE 10 MG/ML
10 INJECTION, SOLUTION EPIDURAL; INFILTRATION; INTRACAUDAL; PERINEURAL ONCE
Status: COMPLETED | OUTPATIENT
Start: 2024-11-21 | End: 2024-11-21

## 2024-11-21 RX ORDER — METOPROLOL TARTRATE 50 MG
50 TABLET ORAL 2 TIMES DAILY
Status: DISCONTINUED | OUTPATIENT
Start: 2024-11-21 | End: 2024-11-26

## 2024-11-21 RX ORDER — NICOTINE 21 MG/24HR
1 PATCH, TRANSDERMAL 24 HOURS TRANSDERMAL DAILY
Status: DISCONTINUED | OUTPATIENT
Start: 2025-01-02 | End: 2024-12-13 | Stop reason: HOSPADM

## 2024-11-21 RX ORDER — METHYLPREDNISOLONE SODIUM SUCCINATE 40 MG/ML
40 INJECTION INTRAMUSCULAR; INTRAVENOUS EVERY 24 HOURS
Status: DISCONTINUED | OUTPATIENT
Start: 2024-11-21 | End: 2024-11-24

## 2024-11-21 RX ADMIN — METOPROLOL TARTRATE 50 MG: 50 TABLET, FILM COATED ORAL at 11:33

## 2024-11-21 RX ADMIN — HYDRALAZINE HYDROCHLORIDE 50 MG: 50 TABLET ORAL at 13:14

## 2024-11-21 RX ADMIN — NICOTINE 1 PATCH: 21 PATCH, EXTENDED RELEASE TRANSDERMAL at 08:10

## 2024-11-21 RX ADMIN — GABAPENTIN 200 MG: 100 CAPSULE ORAL at 20:07

## 2024-11-21 RX ADMIN — LIDOCAINE HYDROCHLORIDE 10 ML: 10 INJECTION, SOLUTION EPIDURAL; INFILTRATION; INTRACAUDAL; PERINEURAL at 15:14

## 2024-11-21 RX ADMIN — ROSUVASTATIN CALCIUM 10 MG: 10 TABLET, FILM COATED ORAL at 20:07

## 2024-11-21 RX ADMIN — AMLODIPINE BESYLATE 10 MG: 10 TABLET ORAL at 08:10

## 2024-11-21 RX ADMIN — FUROSEMIDE 60 MG: 10 INJECTION, SOLUTION INTRAMUSCULAR; INTRAVENOUS at 08:11

## 2024-11-21 RX ADMIN — ISOSORBIDE MONONITRATE 60 MG: 60 TABLET, EXTENDED RELEASE ORAL at 08:12

## 2024-11-21 RX ADMIN — HYDRALAZINE HYDROCHLORIDE 50 MG: 50 TABLET ORAL at 20:07

## 2024-11-21 RX ADMIN — HEPARIN SODIUM 550 UNITS/HR: 10000 INJECTION, SOLUTION INTRAVENOUS at 11:40

## 2024-11-21 RX ADMIN — LABETALOL HYDROCHLORIDE 10 MG: 5 INJECTION INTRAVENOUS at 00:30

## 2024-11-21 RX ADMIN — CLOPIDOGREL BISULFATE 75 MG: 75 TABLET ORAL at 08:10

## 2024-11-21 RX ADMIN — ATENOLOL 25 MG: 25 TABLET ORAL at 08:10

## 2024-11-21 RX ADMIN — ACETAMINOPHEN 1000 MG: 500 TABLET, FILM COATED ORAL at 00:31

## 2024-11-21 RX ADMIN — AZITHROMYCIN MONOHYDRATE 500 MG: 500 INJECTION, POWDER, LYOPHILIZED, FOR SOLUTION INTRAVENOUS at 14:26

## 2024-11-21 RX ADMIN — HYDRALAZINE HYDROCHLORIDE 50 MG: 50 TABLET ORAL at 08:10

## 2024-11-21 RX ADMIN — CEFTRIAXONE SODIUM 2 G: 2 INJECTION, POWDER, FOR SOLUTION INTRAMUSCULAR; INTRAVENOUS at 13:52

## 2024-11-21 RX ADMIN — METHYLPREDNISOLONE SODIUM SUCCINATE 40 MG: 40 INJECTION, POWDER, FOR SOLUTION INTRAMUSCULAR; INTRAVENOUS at 13:14

## 2024-11-21 RX ADMIN — ACETAMINOPHEN 1000 MG: 500 TABLET, FILM COATED ORAL at 09:57

## 2024-11-21 ASSESSMENT — ACTIVITIES OF DAILY LIVING (ADL)
ADLS_ACUITY_SCORE: 0
ADLS_ACUITY_SCORE: 0
TOILETING: 1-->ASSISTANCE (EQUIPMENT/PERSON) NEEDED
ADLS_ACUITY_SCORE: 0
DRESSING/BATHING_DIFFICULTY: NO
ADLS_ACUITY_SCORE: 0
NUMBER_OF_TIMES_PATIENT_HAS_FALLEN_WITHIN_LAST_SIX_MONTHS: 1
ADLS_ACUITY_SCORE: 0
ADLS_ACUITY_SCORE: 0
TOILETING_ISSUES: YES
DIFFICULTY_COMMUNICATING: NO
HEARING_DIFFICULTY_OR_DEAF: NO
CONCENTRATING,_REMEMBERING_OR_MAKING_DECISIONS_DIFFICULTY: NO
CHANGE_IN_FUNCTIONAL_STATUS_SINCE_ONSET_OF_CURRENT_ILLNESS/INJURY: NO
DIFFICULTY_EATING/SWALLOWING: NO
ADLS_ACUITY_SCORE: 0
ADLS_ACUITY_SCORE: 0
WALKING_OR_CLIMBING_STAIRS_DIFFICULTY: YES
ADLS_ACUITY_SCORE: 0
TOILETING_MANAGEMENT: INCONTINENT
ADLS_ACUITY_SCORE: 0
WALKING_OR_CLIMBING_STAIRS: TRANSFERRING DIFFICULTY, REQUIRES EQUIPMENT;TRANSFERRING DIFFICULTY, ASSISTANCE 1 PERSON
ADLS_ACUITY_SCORE: 0
DOING_ERRANDS_INDEPENDENTLY_DIFFICULTY: YES
FALL_HISTORY_WITHIN_LAST_SIX_MONTHS: YES
ADLS_ACUITY_SCORE: 0
WEAR_GLASSES_OR_BLIND: NO
ADLS_ACUITY_SCORE: 0
TOILETING_ASSISTANCE: TOILETING DIFFICULTY, REQUIRES EQUIPMENT;TOILETING DIFFICULTY, ASSISTANCE 1 PERSON
ADLS_ACUITY_SCORE: 0
TOILETING: 0-->NOT TOILET TRAINED OR ASSISTANCE NEEDED (DEVELOPMENTALLY APPROPRIATE)
ADLS_ACUITY_SCORE: 0

## 2024-11-21 NOTE — PLAN OF CARE
A&O x 4. VSS on 50L/80%HFNC. Tele: yumi R VR.  Converted to SR w/ frequent PVC's. Not OOB, R AKA, did not bring prosthetic to hospital. Cardiac diet.  PIV-Infusing with heparin; Hep 10A recheck in for 1800.   Coarse lungs, productive cough. Scattered bruising, scattered scabs. Pain managed with PRN tylenol. Purewick in place. Venous duplex & thora to be done in room later today. IV antibiotics given.  Continue plan of care.

## 2024-11-21 NOTE — PROGRESS NOTES
RT was called to do Blood Gas in ED 7. Tried but missed and the pt said it was too painful and she uses ultrasound to do IV.    Diane Lal, RT  11/20/2024

## 2024-11-21 NOTE — PLAN OF CARE
A&O x 4. VSS on 50L/82%HFNC. Tele: SR. No OOB, R AKA, did not bring prosthetic to hospital. NPO ex meds and ice chips.  PIV in RUE, SL. Coarse lungs, productive cough. Scattered bruising, scattered scabs. Pain managed with PRN tylenol. Purewick in place. Continue plan of care.

## 2024-11-21 NOTE — PROVIDER NOTIFICATION
MD Notification    Notified Person: MD    Notified Person Name: Dr. Huizar     Notification Date/Time: 11/21/24 1630    Notification Interaction: Vocera message     Purpose of Notification: Thora done around 1600. When do we need to restart heparin.     Orders Received: None     Comments: Restart in 1 hour.

## 2024-11-21 NOTE — PROGRESS NOTES
[facility]    Medicine Progress Note - Hospitalist Service    Date of Admission:  11/20/2024    Assessment & Plan      Shirley Hendricks is a 64 year old female, on Plavix, with a history of COPD, hypertension, CHF, peripheral artery disease, NSTEMI, stage 4 CKD, and tobacco use who is being admitted for evaluation of shortness of breath.      Acute hypoxic respiratory failure   Acute on chronic diastolic heart failure in exacerbation:  Moderate to severe left pleural effusion status post thoracentesis  Type II NSTEMI likely due to demand due to heart failure exacerbation  # Community Acquired Pnuemonia  Assessment: Presents with 2-day history of increasing shortness of breath and a cough.  On admission chest x-ray shows a large left pleural effusion with compressive atelectasis.  There is also diffuse interstitial opacities consistent with pulmonary edema.  No pneumothorax was noted.  Labs are pertinent for a proBNP of 26,004-20, mildly elevated point of 98, creatinine 1.71. PTA on Norvasc 10 mg daily, atenolol 25 mg daily, torsemide 50 mg daily, Jardiance 10 mg daily  Plan:  - - Ultrasound guided thoracentesis on 11/20/24 s/p .1 liters of straw-colored fluid were removed and sent to lab, if requested.   - Monitor on telemetry  - Follow daily weights/I's and O's  - Cardiac diet  -- Continue BiPAP, wean as able  --Pulmonolgy consulted   - Continuous oximetry  - Cardiology Consulted  - Continue PTA jardiance 10 mg daily   - Continue PTA Atenolol  - Continue PTA Imdur 60 mg daily   -Keep oxygen above 90%   Echo 7/24The left ventricle is normal in structure, function and size. The visual  ejection fraction is estimated at 60%. Normal wall motion.  -CT showed bilateral pleural effusions and repeat Thoracentesis ordered  -Discussed with pulmonology Dr. Magdaleno and cardiology    -Hold diuresis today due to bump in creatinine  -CT scan also shows right-sided superimposed pneumonia and  patient has been started on ceftriaxone and azithromycin  -Plan to get thoracentesis with both diagnostic and therapeutic  -Methylprednisone started by pulmonology  Keep oxygen saturation above 90%  -Limited echo ordered      # New onset atrial fibrillation with RVR  -Patient started on heparin drip and transition to DOAC  -Atenolol discontinued and metoprolol 50 twice daily started        Hypertension  Hyperlipidemia  Coronary artery disease with history of MI in 2019  Peripheral vascular disease with history of 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 2024  Assessment: PTA on amlodipine 10 mg, Coreg changed to atenolol 25 mg daily, hydralazine 50 mg 3 times daily, Imdur 60 mg daily, Crestor 10 mg  Plan:  - patti, Imdur 60 mg daily, Crestor 10 mg  -Atenolol discontinued and metoprolol 50 mg twice daily starting     Acute kidney injury  on CKD stage III  Assessment: Creatinine on admission of 1.71, which is within her baseline.1.2-1.4  Plan:  -Daily BMP while on IV diuresis  -Avoid NSAIDs/nephrotoxins  -Hold Lasix today due to bump in creatinine to 2.02     History of COPD not in exacerbation  - Continue with Breo and as needed albuterol nebs available     GERD  - Continue with PTA famotidine     Anemia of Chronic Disease  Baseline hemoglobin has been between 8-9 over the past few months  Plan:  - Hemoglobin seems to be stable at 12.7 with no evidence of acute blood loss on admission     History of thrombocytopenia  - Platelet count normal on admission     Tobacco use  - Continue with nicotine patch  -Counselled on cessation           Diet: Low Saturated Fat Na <2400 mg    DVT Prophylaxis: Heparin GTT  Alvarez Catheter: Not present  Lines: None     Cardiac Monitoring: ACTIVE order. Indication: Acute decompensated heart failure (48 hours)  Code Status: Full Code      Clinically Significant Risk Factors Present on Admission         # Hyponatremia: Lowest Na = 131 mmol/L in last 2 days, will monitor as appropriate  # Hypochloremia: Lowest Cl =  94 mmol/L in last 2 days, will monitor as appropriate      # Hypoalbuminemia: Lowest albumin = 3 g/dL at 11/21/2024  4:42 AM, will monitor as appropriate   # Drug Induced Platelet Defect: home medication list includes an antiplatelet medication   # Hypertension: Noted on problem list     # Acute Hypoxic Respiratory Failure: Documented O2 saturation < 90%. Continue supplemental oxygen as needed   # Anemia: based on hgb <11           # Financial/Environmental Concerns:           Social Drivers of Health    Tobacco Use: Medium Risk (11/14/2024)    Received from [employer]    Patient History     Smoking Tobacco Use: Former     Smokeless Tobacco Use: Never     Passive Exposure: Past          Disposition Plan     Medically Ready for Discharge: Anticipated in 2-4 Days             Yoli Huizar MD  Hospitalist Service  [facility]  Securely message with [employer] (more info)  Text page via [employer] Paging/Directory   ______________________________________________________________________    Interval History   Patient seen and examined at bedside no acute overnight events.  Patient continues to be an high flow nasal cannula she is refusing BiPAP.  Her heart rates have been elevated.  She feels palpitations.  Physical Exam   Vital Signs: Temp: 97.8  F (36.6  C) Temp src: Oral BP: (!) 173/73 Pulse: 55   Resp: 24 SpO2: 93 % O2 Device: High Flow Nasal Cannula (HFNC) Oxygen Delivery: 50 LPM  Weight: 100 lbs 0 oz    Physical Exam  Cardiovascular:      Rate and Rhythm: Tachycardia present. Rhythm irregular.      Heart sounds: Normal heart sounds.   Pulmonary:      Effort: No respiratory distress.      Breath sounds: No wheezing.      Comments: Decreased breath sounds bilaterally   Abdominal:      General: There is no distension.      Palpations: Abdomen is soft.      Tenderness: There is no abdominal tenderness.          Medical Decision Making       Data     I have personally  reviewed the following data over the past 24 hrs:    6.7  \   13.1   / 224     131 (L) 99 56.5 (H) /  81   4.1 20 (L) 2.02 (H) \     ALT: 15 AST: 16 AP: 66 TBILI: 0.2   ALB: 3.0 (L) TOT PROTEIN: 6.7 LIPASE: N/A     Trop: 87 (H) BNP: 26,420 (H)     Procal: N/A CRP: N/A Lactic Acid: 0.6       Ferritin:  N/A % Retic:  N/A LDH:  161       Imaging results reviewed over the past 24 hrs:   Recent Results (from the past 24 hours)   XR Chest Port 1 View    Narrative    EXAM: XR CHEST PORT 1 VIEW  LOCATION: [facility]  DATE: 11/20/2024    INDICATION: Shortness of breath, CHF vs COPD  COMPARISON: CT chest 7/31/2024, chest radiograph 7/30/2024      Impression    IMPRESSION: Large right, moderate sized left pleural effusions with compressive atelectasis. Diffuse interstitial opacities suggest pulmonary edema. No pneumothorax. Cardiac silhouette and mediastinal contours are unchanged where seen. Aortic arch   calcifications.   US Thoracentesis    Narrative    EXAM:   1. RIGHT THORACENTESIS  2. ULTRASOUND GUIDANCE  LOCATION: [facility]  DATE: 11/20/2024    INDICATION: Pleural effusion.    PROCEDURE: Informed consent obtained. Time out performed. The chest was prepped and draped in sterile fashion. 15 mL of 1 % lidocaine was infused into the local soft tissues. Under direct ultrasound guidance, a centesis catheter system was placed into   the pleural effusion.     1.1 liters of straw-colored fluid were removed and sent to lab, if requested.    Patient tolerated procedure well.    Ultrasound imaging was obtained and placed in the patient's permanent medical record.      Impression    IMPRESSION:  Status post ultrasound-guided right thoracentesis.    Reference CPT Code: 08446   XR Chest Port 1 View    Narrative    CHEST ONE VIEW  11/21/2024 8:23 AM     HISTORY: hypoxia    COMPARISON: Chest radiograph 11/20/2024.      Impression    IMPRESSION: Decreased small to moderate right and  small left pleural  effusions with adjacent atelectasis. Mild interstitial pulmonary  edema. Slightly more confluent left perihilar opacity may reflect  alveolar edema. No convincing pneumothorax. Similar cardiomediastinal  silhouette.    SILVIO COOK MD         SYSTEM ID:  [id number]   CT Chest w/o Contrast    Narrative    CT CHEST WITHOUT CONTRAST  11/21/2024 9:49 AM    CLINICAL HISTORY: Asses for pneumonia, edema, pleural effusion.    TECHNIQUE: CT chest without IV contrast. Multiplanar reformats were  obtained. Dose reduction techniques were used.  CONTRAST: None.    COMPARISON: Chest radiograph 11/21/2024.    FINDINGS:   LUNGS AND PLEURA: Decreased small to moderate right pleural effusion  with adjacent atelectasis. New patchy consolidative opacity within the  right middle lobe and lower lobe with air bronchograms. Similar  moderate left pleural effusion with complete atelectasis of the left  lower lobe. No pneumothorax. Mild interstitial pulmonary edema.    MEDIASTINUM/AXILLAE: Prominent mediastinal lymph nodes may be  reactive. No thoracic aortic aneurysm. Hypodense thyroid nodules  measuring up to 1.1 cm. Trace pericardial fluid.    CORONARY ARTERY CALCIFICATION: Severe.    UPPER ABDOMEN: Trace perisplenic ascites. Cholecystectomy.    MUSCULOSKELETAL: No aggressive osseous lesion. Remote left rib  fracture.      Impression    IMPRESSION:   1.  Small to moderate right pleural effusion with adjacent  atelectasis. New patchy consolidative opacity within the right middle  lobe and lower lobe concerning for superimposed pneumonia with  possible component of reexpansion edema.  2.  Moderate left pleural effusion with complete atelectasis of the  left lower lobe.  3.  Mild interstitial pulmonary edema.    SILVIO COOK MD         SYSTEM ID:  [id number]

## 2024-11-21 NOTE — ED NOTES
Removed mask per pt request, patient provided sips of water and tolerated well. Watched on RA while bedside, SpO2 dropped to 85% on RA pt wanted to leave mask off. Discussed treatment plan and at this time. Patient allowed BIPAP to be put back on. SpO2 now 94% remains on cardiac, SpO2, and B/P monitors. BIPAP at 60% FiO2.    ESRD on dialysis

## 2024-11-21 NOTE — ED NOTES
Paged Dr White, pt c/o chest pain while on BIPAP, and c/o not tolerating BIPAP. Received order for Nitroglycerin and RT bedside to try patient off BIPAP and on 10LPM Oxymask RR non-labored SpO2 92% remains on cardiac, SpO2, and B/P monitors.

## 2024-11-21 NOTE — ED NOTES
At this time pt states R sided CP is gone after 1 SL nitroglycerin. Pt remains on 10 LPM O2 per [employer] and tolerating. Remains on cardiac, SpO2, B/P monitors. Patient repositioned on her L side with pillow supports per her request for comfort. Continue to monitor.

## 2024-11-21 NOTE — CONSULTS
[facility]    Cardiology Consultation     Date of Admission:  11/20/2024    Assessment & Plan   Shirley Hendricks is a 64 year old female who was admitted on 11/20/2024.      Acute hypoxic respiratory failure.  Etiology multifactorial with element of acute decompensated congestive heart failure, large pleural effusions, COPD.  Patient received IV Lasix.  There is significant bump in creatinine.  Recommend withholding further dose of diuretic as primary team is planning further thoracentesis as significant residual pleural effusion noted on CT despite 1 L of thoracentesis yesterday.  Newly diagnosed atrial fibrillation with rapid ventricular rate.  CHADS2 Vascor of at least 5.  (Age, gender, vascular disease, hypertension, congestive heart failure).  Recommend switching atenolol to metoprolol as patient has acute on chronic renal failure and for better rate control.  Agree with heparin which can be changed to a NOAC when patient no longer needs discontinuation of anticoagulation for procedures.  History of CAD with occluded diagonal and moderate coronary disease elsewhere.  Preserved ejection patient congestive heart failure with acute decompensation.  Echocardiogram pending.  Started on IV Lasix 60 mg twice daily.  Significant bump in creatinine noticed.  Recommend withholding Lasix for now as patient is going to undergo repeat thoracentesis  Peripheral artery disease  Acute on chronic renal failure  Elevated troponin.  Overall flat pattern.  Likely demand supply mismatch in the setting of hypoxic respiratory failure and renal failure and underlying CAD including appropriate diet    Recommendations  Agree with repeat thoracentesis  Change atenolol to metoprolol tartrate 50 mg twice daily  Agree with heparin which can be changed to NOAC subsequently  Agree with holding next dose of Lasix as significant bump in creatinine noticed after IV diuresis.  Echo cardiogram which is  pending        High complexity     Seth Preston MD, MD    Primary Care Physician   Vasquez Benoit    Reason for Consult   Reason for consult: I was asked to evaluate this patient for congestive heart failure.    History of Present Illness   Shirley Hendricks is a 64 year old female who presents with shortness of breath for past few days.  She has history of CAD with chronically occluded diagonal and moderate CAD elsewhere, peripheral artery disease, history of preserved ejection fraction congestive heart failure, history of stress cardiomyopathy with normalization of LV function subsequently, hypertension, dyslipidemia, COPD with ongoing tobacco abuse.  She is also now noted to be in atrial fibrillation with rapid ventricular rate.  She denies any chest discomfort or palpitations.  She received IV Lasix her baseline creatinine is around 1.5 it increased to 2 after diuresis.  She had 1 L of thoracentesis done yesterday.  CT chest today shows still residual significant pleural effusion and primary team is planning repeat thoracentesis today.    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 clot    Post-menopausal     Reflux  esophagitis 02/2004    EGD: esophagitis and medium HH    SBO (small bowel obstruction) (H) 08/10/2021    SVT (supraventricular tachycardia) (H)     Thrombocytopenia (H)     Uncomplicated asthma     Vitamin C deficiency 08/12/2018         Past Surgical History   Past Surgical History:   Procedure Laterality Date    AMPUTATE LEG ABOVE KNEE N/A 4/1/2024    Procedure: AMPUTATION, ABOVE KNEE right leg with wound vac dressing, excision of anteriotibial bypass graft, closure of (previous interventional radiology) left groin incision;  Surgeon: José Luis Hernandez MD;  Location:  OR    AMPUTATE LEG ABOVE KNEE Right 4/9/2024    Procedure: COMPLETION RIGHT ABOVE KNEE AMPUTATION;  Surgeon: José Luis Hernandez MD;  Location:  OR    ANGIOGRAM      ANGIOGRAM Right 6/23/2021    Procedure: RIGHT LOWER EXTREMITY ANGIOGRAM WITH LEFT BRACHIAL ARTERY CUTDOWN;  Surgeon: José Luis Hernandez MD;  Location:  OR    APPLY WOUND VAC Right 5/16/2024    Procedure: PLACEMENT OF WOUND VAC TO RIGHT ABOVE KNEE AMPUTATION;  Surgeon: Tay Enciso MD;  Location:  OR    APPLY WOUND VAC N/A 5/20/2024    Procedure: AND PLACEMENT OF WOUND VAC;  Surgeon: José Luis Hernandez MD;  Location:  OR    BIOPSY MASS NECK Right 10/11/2023    Procedure: Right Parotid Biopsy;  Surgeon: Randal Mendoza MD;  Location:  OR    BRONCHOSCOPY FLEXIBLE AND RIGID N/A 6/26/2024    Procedure: Bronchoscopy flexible and rigid;  Surgeon: Rod Nath MD;  Location:  GI    BYPASS GRAFT FEMOROPOPLITEAL Right 09/23/2020    Procedure: RIGHT FEMORAL GRAFT TO ABOVE-KNEE POPLITEAL BYPASS USING CADAVERIC SUPERFICIAL FEMORAL ARTERY;  Surgeon: Chadwick Lozano MD;  Location:  OR    BYPASS GRAFT FEMOROPOPLITEAL Right 2/15/2022    Procedure: RIGHT SUPERFICIAL FEMORAL ARTERY GRAFT TO BELOW KNEE POPLITEAL BYPASS WITH CADAVERIC CRYOLIFE  FEMORAL-POPLITEAL ARTERY SIZE: OUTER DIAMETER: 7MM - 6MM;  Surgeon: Chadwick Lozano;  Location:  OR     BYPASS GRAFT FEMOROPOPLITEAL Right 5/26/2023    Procedure: RIGHT DISTAL SUPERFICIAL FEMORAL GRAFT TO ANTERIOR TIBIAL ARTERY WITH 26 CENTIMETER CADAVERIC SUPERFICIAL FEMORAL ARTERY GRAFT;  Surgeon: Chadwick Lozano MD;  Location:  OR    BYPASS GRAFT FEMOROPOPLITEAL Right 10/11/2023    Procedure: RIGHT FEMORAL TO POPLITEAL GRAFT THROMBECTOMY;  Surgeon: Chadwick Lozano MD;  Location:  OR    BYPASS GRAFT INSITU FEMOROPOPLITEAL Right 7/7/2021    Procedure: CREATION RIGHT FEMORAL TO POPLITEAL ARTERIAL BYPASS USING INSITU VEIN GRAFT;  Surgeon: José Luis Hernandez MD;  Location:  OR    CARDIAC CATHERIZATION  09/03/2009    multivessel CAD without target lesions, med mgmt indicated, preserved ef    CARPAL TUNNEL RELEASE RT/LT Right 05/20/2021    COLONOSCOPY N/A 12/12/2023    Procedure: Colonoscopy;  Surgeon: Corey Hoffman MD;  Location:  GI    COLONOSCOPY N/A 12/14/2023    Procedure: Colonoscopy;  Surgeon: Corey Hoffman MD;  Location:  GI    CV CORONARY ANGIOGRAM N/A 10/4/2023    Procedure: Coronary Angiogram;  Surgeon: Rogelio Ricks MD;  Location:  HEART CARDI[facility] CATH LAB    CV PCI N/A 10/4/2023    Procedure: Percutaneous Coronary Intervention;  Surgeon: Rogelio Ricks MD;  Location:  HEART CARD[facility] CATH LAB    EMBOLECTOMY LOWER EXTREMITY Right 10/6/2023    Procedure: Right anterior tibial bypass with graft, Right tibial endarterectomy,thrombectomy, Right doraslis pedis thrombectomy, Anterior Tibial vein patch.;  Surgeon: Chadwick Lozano MD;  Location:  OR    ENDARTERECTOMY CAROTID Right 05/11/2016    Procedure: ENDARTERECTOMY CAROTID;  Surgeon: Chadwick Lozano MD;  Location:  OR    ENDARTERECTOMY CAROTID Left 06/08/2020    Procedure: LEFT CAROTID ENDARTERECTOMY with distal facal vein patch  and EEG;  Surgeon: Chadwick Lozano MD;  Location:  OR    ESOPHAGOSCOPY, GASTROSCOPY, DUODENOSCOPY (EGD), COMBINED N/A 12/12/2023     Procedure: Esophagoscopy, gastroscopy, duodenoscopy (EGD), combined;  Surgeon: Corey Hoffman MD;  Location:  GI    FINE NEEDLE ASPIRATION WITHOUT IMAGING GUIDANCE Right 9/22/2023    Procedure: Fine needle aspiration without imaging guidance;  Surgeon: Kiersten Aguilera MD;  Location: [facility]    FLUORESCENCE INTRAOPERATIVE VASCULAR ANGIOGRAPHY Right 12/28/2022    Procedure: RIGHT LEG ANGIOGRAM with intervention;  Surgeon: Chadwick Lozano MD;  Location:  OR    GYN SURGERY  left tube    left salpingectomy    IR ANGIOGRAM THROUGH CATHETER (ARTERIAL)  10/6/2023    IR ANGIOGRAM THROUGH CATHETER (ARTERIAL)  10/6/2023    IR ANGIOGRAM THROUGH CATHETER (ARTERIAL)  3/31/2024    IR ANGIOGRAM THROUGH CATHETER (ARTERIAL)  3/30/2024    IR CHEST TUBE PLACEMENT NON-TUNNELLED LEFT  6/23/2024    IR CVC TUNNEL PLACEMENT > 5 YRS OF AGE  4/3/2024    IR CVC TUNNEL PLACEMENT > 5 YRS OF AGE  6/23/2024    IR CVC TUNNEL REMOVAL RIGHT  5/28/2024    IR CVC TUNNEL REMOVAL RIGHT  7/3/2024    IR CVC TUNNEL REVISION RIGHT  4/15/2024    IR LOWER EXTREMITY ANGIOGRAM RIGHT  05/25/2021    IR LOWER EXTREMITY ANGIOGRAM RIGHT  10/5/2023    IR LOWER EXTREMITY ANGIOGRAM RIGHT  3/29/2024    IR OR ANGIOGRAM  6/23/2021    IR OR ANGIOGRAM  12/28/2022    IRRIGATION AND DEBRIDEMENT LOWER EXTREMITY, COMBINED Right 5/16/2024    Procedure: IRRIGATION AND DEBRIDEMENT OF RIGHT ABOVE KNEE AMPUTATION;  Surgeon: Tay Enciso MD;  Location:  OR    IRRIGATION AND DEBRIDEMENT LOWER EXTREMITY, COMBINED Right 5/20/2024    Procedure: IRRIGATION AND DEBRIDMENT RIGHT LOWER EXTREMITY;  Surgeon: José Luis Hernandez MD;  Location:  OR    LAPAROSCOPIC CHOLECYSTECTOMY N/A 09/27/2017    Procedure: LAPAROSCOPIC CHOLECYSTECTOMY;  LAPAROSCOPIC CHOLECYSTECTOMY;  Surgeon: Jacoby Tapia MD;  Location:  [facility]    LAPAROSCOPY DIAGNOSTIC (GENERAL) N/A 8/11/2021    Procedure: Exploratory laparoscopy,  laparoscopic lysis of adhesions, laparotomy;   Surgeon: Corina Ferreira MD;  Location:  OR    OR ANGIOGRAM, LOWER EXTREMITY Right 10/11/2023    Procedure: Or Angiogram, Lower Extremity;  Surgeon: Chadwick Lozano MD;  Location:  OR    ORTHOPEDIC SURGERY      left knee surgery    REPAIR ANEURYSM FEMORAL ARTERY Left 12/28/2022    Procedure: REPAIR LEFT FEMORAL PSEUDOANEURYSM;  Surgeon: Chadwick Lozano MD;  Location:  OR    VASCULAR SURGERY  aoto bi fem  left fem-AK pop    [facility] FABRIC WRAPPING OF ABDOMINAL ANEURYSM      [facility] NONSPECIFIC PROCEDURE  12/2000    angioplasty with stent right fem. a.    [facility] NONSPECIFIC PROCEDURE  1987    sinus surgery    [facility] NONSPECIFIC PROCEDURE  09/02/2009    Emergent left groin exploration with oversewing of bleeding angiographic site. 2. Endarterectomy of common femoral-proximal superficial femoral artery with greater saphenous vein patch angioplasty.   a. [place] of accessory right greater saphenous vein.     [facility] NONSPECIFIC PROCEDURE  08/27/2009    occluded left common iliac and external iliac arteries were successfully revascularized with stenting to 8 and 7 mm          Prior to Admission Medications   Prior to Admission Medications   Prescriptions Last Dose Informant Patient Reported? Taking?   acetaminophen (TYLENOL) 500 MG tablet   No Yes   Sig: Take 1-2 tablets (500-1,000 mg) by mouth every 6 hours as needed for mild pain   amLODIPine (NORVASC) 10 MG tablet 11/20/2024 Morning  No Yes   Sig: Take 1 tablet (10 mg) by mouth daily   atenolol (TENORMIN) 25 MG tablet 11/20/2024 Morning  No Yes   Sig: Take 1 tablet (25 mg) by mouth daily.   budesonide-formoterol (SYMBICORT) 160-4.5 MCG/ACT Inhaler 11/20/2024 Morning  No Yes   Sig: Inhale 2 puffs into the lungs two times daily Disp 3 inhalers   clopidogrel (PLAVIX) 75 MG tablet 11/20/2024 Morning  No Yes   Sig: Take 1 tablet (75 mg) by mouth daily.   empagliflozin (JARDIANCE) 10 MG TABS tablet 11/20/2024 Morning  No Yes   Sig: Take 1 tablet (10 mg) by mouth daily.    gabapentin (NEURONTIN) 100 MG capsule 11/19/2024 Bedtime  No Yes   Sig: Take 2 capsules (200 mg) by mouth at bedtime   hydrALAZINE (APRESOLINE) 50 MG tablet 11/20/2024 Morning  Yes Yes   Sig: Take 1 tablet (50 mg) by mouth 3 times daily.   isosorbide mononitrate (IMDUR) 60 MG 24 hr tablet 11/20/2024 Morning  No Yes   Sig: Take 1 tablet (60 mg) by mouth daily.   nitroGLYcerin (NITROSTAT) 0.4 MG sublingual tablet  Self No Yes   Sig: Place 1 tablet (0.4 mg) under the tongue See Admin Instructions for chest pain   rosuvastatin (CRESTOR) 10 MG tablet 11/19/2024 Bedtime  No Yes   Sig: Take 1 tablet (10 mg) by mouth at bedtime   torsemide (DEMADEX) 10 MG tablet 11/20/2024 Morning  Yes Yes   Sig: Take 30 mg by mouth daily.      Facility-Administered Medications: None     Current Facility-Administered Medications   Medication Dose Route Frequency Provider Last Rate Last Admin    acetaminophen (TYLENOL) tablet 1,000 mg  1,000 mg Oral Q6H PRN Marcin White MD   1,000 mg at 11/21/24 0957    albuterol (PROVENTIL HFA/VENTOLIN HFA) inhaler  2 puff Inhalation Q4H PRN Marcin White MD        amLODIPine (NORVASC) tablet 10 mg  10 mg Oral Daily Marcin White MD   10 mg at 11/21/24 0810    calcium carbonate (TUMS) chewable tablet 1,000 mg  1,000 mg Oral 4x Daily PRN Marcin White MD        carboxymethylcellulose PF (REFRESH PLUS) 0.5 % ophthalmic solution 1 drop  1 drop Both Eyes Q1H PRN Marcin White MD        clopidogrel (PLAVIX) tablet 75 mg  75 mg Oral Daily Marcin White MD   75 mg at 11/21/24 0810    diphenhydrAMINE-zinc acetate (BENADRYL) 2-0.1 % cream   Topical TID PRN Carlos Hunter MD        fluticasone-vilanterol (BREO ELLIPTA) 200-25 MCG/ACT inhaler 1 puff  1 puff Inhalation Daily Marcin White MD        [Held by provider] furosemide (LASIX) injection 60 mg  60 mg Intravenous BID Marcin White MD   60 mg at 11/21/24 0811    gabapentin (NEURONTIN) capsule 200 mg  200 mg Oral At Bedtime Marcin White MD   200 mg at 11/20/24 5590     heparin 25,000 units in 0.45% NaCl 250 mL ANTICOAGULANT infusion  0-5,000 Units/hr Intravenous Continuous Yoli Huizar MD        hydrALAZINE (APRESOLINE) tablet 50 mg  50 mg Oral TID Marcin White MD   50 mg at 11/21/24 0810    isosorbide mononitrate (IMDUR) 24 hr tablet 60 mg  60 mg Oral Daily Marcin White MD   60 mg at 11/21/24 0812    lidocaine (LMX4) cream   Topical Q1H PRN Marcin White MD        lidocaine (LMX4) cream   Topical Q1H PRN Marcin White MD        lidocaine 1 % 0.1-1 mL  0.1-1 mL Other Q1H PRN Marcin White MD        lidocaine 1 % 0.1-1 mL  0.1-1 mL Other Q1H PRN Marcin White MD        metoprolol tartrate (LOPRESSOR) tablet 50 mg  50 mg Oral BID Yoli Huizar MD        nicotine (NICODERM CQ) 21 MG/24HR 24 hr patch 1 patch  1 patch Transdermal Daily Carlos Hunter MD   1 patch at 11/21/24 0810    Followed by    [START ON 1/2/2025] nicotine (NICODERM CQ) 14 MG/24HR 24 hr patch 1 patch  1 patch Transdermal Daily Carlos Hunter MD        Followed by    [START ON 1/30/2025] nicotine (NICODERM CQ) 7 MG/24HR 24 hr patch 1 patch  1 patch Transdermal Daily Carlos Hunter MD        nitroGLYcerin (NITROSTAT) sublingual tablet 0.4 mg  0.4 mg Sublingual Q5 Min PRN Marcin White MD   0.4 mg at 11/20/24 2119    No lozenges or gum should be given while patient on BIPAP/AVAPS/AVAPS AE   Does not apply Continuous PRN Marcin White MD        Patient may continue current oral medications   Does not apply Continuous PRN Marcin White MD        prochlorperazine (COMPAZINE) injection 5 mg  5 mg Intravenous Q6H PRN Marcin White MD        Or    prochlorperazine (COMPAZINE) tablet 5 mg  5 mg Oral Q6H PRN Marcin White MD        rosuvastatin (CRESTOR) tablet 10 mg  10 mg Oral At Bedtime Marcin White MD   10 mg at 11/20/24 4785    senna-docusate (SENOKOT-S/PERICOLACE) 8.6-50 MG per tablet 1 tablet  1 tablet Oral BID PRN Marcin White MD        Or    senna-docusate (SENOKOT-S/PERICOLACE)  8.6-50 MG per tablet 2 tablet  2 tablet Oral BID PRN Marcin White MD        sodium chloride (PF) 0.9% PF flush 3 mL  3 mL Intracatheter Q8H Marcin White MD        sodium chloride (PF) 0.9% PF flush 3 mL  3 mL Intracatheter q1 min prn Marcin White MD        sodium chloride (PF) 0.9% PF flush 3 mL  3 mL Intracatheter Q8H Marcin White MD   3 mL at 11/21/24 0030    sodium chloride (PF) 0.9% PF flush 3 mL  3 mL Intracatheter q1 min prn Marcin White MD        sodium chloride (PF) 0.9% PF flush 3 mL  3 mL Intracatheter Q8H Marcin White MD        sodium chloride (PF) 0.9% PF flush 3 mL  3 mL Intracatheter q1 min prn Marcin White MD         Current Facility-Administered Medications   Medication Dose Route Frequency Provider Last Rate Last Admin    acetaminophen (TYLENOL) tablet 1,000 mg  1,000 mg Oral Q6H PRN Marcin White MD   1,000 mg at 11/21/24 0957    albuterol (PROVENTIL HFA/VENTOLIN HFA) inhaler  2 puff Inhalation Q4H PRN Marcin White MD        amLODIPine (NORVASC) tablet 10 mg  10 mg Oral Daily Marcin White MD   10 mg at 11/21/24 0810    calcium carbonate (TUMS) chewable tablet 1,000 mg  1,000 mg Oral 4x Daily PRN Marcin White MD        carboxymethylcellulose PF (REFRESH PLUS) 0.5 % ophthalmic solution 1 drop  1 drop Both Eyes Q1H PRN Marcin White MD        clopidogrel (PLAVIX) tablet 75 mg  75 mg Oral Daily Marcin White MD   75 mg at 11/21/24 0810    diphenhydrAMINE-zinc acetate (BENADRYL) 2-0.1 % cream   Topical TID PRN Carols Hunter MD        fluticasone-vilanterol (BREO ELLIPTA) 200-25 MCG/ACT inhaler 1 puff  1 puff Inhalation Daily Marcin White MD        [Held by provider] furosemide (LASIX) injection 60 mg  60 mg Intravenous BID Marcin White MD   60 mg at 11/21/24 0811    gabapentin (NEURONTIN) capsule 200 mg  200 mg Oral At Bedtime Marcin White MD   200 mg at 11/20/24 2215    heparin 25,000 units in 0.45% NaCl 250 mL ANTICOAGULANT infusion  0-5,000 Units/hr Intravenous Continuous Yoli Huizar  MD Broderick        hydrALAZINE (APRESOLINE) tablet 50 mg  50 mg Oral TID Marcin White MD   50 mg at 11/21/24 0810    isosorbide mononitrate (IMDUR) 24 hr tablet 60 mg  60 mg Oral Daily Marcin White MD   60 mg at 11/21/24 0812    lidocaine (LMX4) cream   Topical Q1H PRN Marcin White MD        lidocaine (LMX4) cream   Topical Q1H PRN Marcin White MD        lidocaine 1 % 0.1-1 mL  0.1-1 mL Other Q1H PRN Marcin White MD        lidocaine 1 % 0.1-1 mL  0.1-1 mL Other Q1H PRN Marcin White MD        metoprolol tartrate (LOPRESSOR) tablet 50 mg  50 mg Oral BID Yoli Huizar MD        nicotine (NICODERM CQ) 21 MG/24HR 24 hr patch 1 patch  1 patch Transdermal Daily Carlos Hunter MD   1 patch at 11/21/24 0810    Followed by    [START ON 1/2/2025] nicotine (NICODERM CQ) 14 MG/24HR 24 hr patch 1 patch  1 patch Transdermal Daily Carlos Hunter MD        Followed by    [START ON 1/30/2025] nicotine (NICODERM CQ) 7 MG/24HR 24 hr patch 1 patch  1 patch Transdermal Daily Carlos Hunter MD        nitroGLYcerin (NITROSTAT) sublingual tablet 0.4 mg  0.4 mg Sublingual Q5 Min PRN Marcin White MD   0.4 mg at 11/20/24 2119    No lozenges or gum should be given while patient on BIPAP/AVAPS/AVAPS AE   Does not apply Continuous PRN Marcin White MD        Patient may continue current oral medications   Does not apply Continuous PRN Marcin White MD        prochlorperazine (COMPAZINE) injection 5 mg  5 mg Intravenous Q6H PRN Marcin White MD        Or    prochlorperazine (COMPAZINE) tablet 5 mg  5 mg Oral Q6H PRN Marcin White MD        rosuvastatin (CRESTOR) tablet 10 mg  10 mg Oral At Bedtime Marcin White MD   10 mg at 11/20/24 2215    senna-docusate (SENOKOT-S/PERICOLACE) 8.6-50 MG per tablet 1 tablet  1 tablet Oral BID PRMarcin Pablo MD        Or    senna-docusate (SENOKOT-S/PERICOLACE) 8.6-50 MG per tablet 2 tablet  2 tablet Oral BID PRMarcin Pablo MD        sodium chloride (PF) 0.9% PF flush 3 mL  3 mL  Intracatheter Q8H Marcin White MD        sodium chloride (PF) 0.9% PF flush 3 mL  3 mL Intracatheter q1 min prn Marcin White MD        sodium chloride (PF) 0.9% PF flush 3 mL  3 mL Intracatheter Q8H Marcin White MD   3 mL at 11/21/24 0030    sodium chloride (PF) 0.9% PF flush 3 mL  3 mL Intracatheter q1 min prn Marcin White MD        sodium chloride (PF) 0.9% PF flush 3 mL  3 mL Intracatheter Q8H Marcin White MD        sodium chloride (PF) 0.9% PF flush 3 mL  3 mL Intracatheter q1 min prn Marcin White MD         Allergies   Allergies   Allergen Reactions    Contrast Dye Anaphylaxis     Pt reported facial and throat swelling with prior CT contrast    Pantoprazole      Protonix caused diffuse edema    Chantix [Varenicline]      Terrible dreams    Gluten Meal GI Disturbance     Pt has celiac disease    Penicillins Itching and Rash       Social History    reports that she has quit smoking. Her smoking use included cigarettes. She started smoking about 56 years ago. She has a 14.2 pack-year smoking history. She has never used smokeless tobacco. She reports that she does not currently use alcohol. She reports current drug use. Drug: Marijuana.      Family History   I have reviewed this patient's family history and updated it with pertinent information if needed.  Family History   Problem Relation Age of Onset    Cancer Mother         bladder    Cardiovascular Father         alive,multiple heart attacks    Diabetes Maternal Grandmother     Lung Cancer Maternal Grandmother     Blood Disease Brother         clotting disorder          Review of Systems   A comprehensive review of system was performed and is negative other than that noted in the HPI or here.     Physical Exam   Vital Signs with Ranges  Temp:  [97.8  F (36.6  C)-98.1  F (36.7  C)] 97.8  F (36.6  C)  Pulse:  [55-77] 55  Resp:  [16-39] 24  BP: (136-198)/(60-98) 173/73  FiO2 (%):  [60 %-85 %] 80 %  SpO2:  [83 %-97 %] 93 %  Wt Readings from Last 4 Encounters:  "  11/20/24 45.4 kg (100 lb)   09/23/24 44.8 kg (98 lb 12.8 oz)   08/15/24 47.2 kg (104 lb)   08/07/24 47.2 kg (104 lb 0.9 oz)     No intake/output data recorded.      Vitals: BP (!) 173/73   Pulse 55   Temp 97.8  F (36.6  C) (Oral)   Resp 24   Ht 1.575 m (5' 2\")   Wt 45.4 kg (100 lb)   LMP  (LMP Unknown)   SpO2 93%   BMI 18.29 kg/m      Physical Exam:   General Patient appears comfortable  Neck normal JVP  Cardiovascular system S1-S2 normal, irregular, tachycardia, no murmur rub or gallop, JVP does not appear to be elevated  Respiratory system decreased air entry in the heart lung fields more on mainly on the left side greater than right side, no significant crackles or wheezing  Extremities no edema    No lab results found in last 7 days.    Invalid input(s): \"TROPONINIES\"    Recent Labs   Lab 11/21/24 0442 11/21/24  0003 11/20/24  1555   WBC 8.9  --  7.7   HGB 10.8*  --  12.7   MCV 85  --  85     --  291   *  --  133*   POTASSIUM 4.1  --  4.3   CHLORIDE 99  --  94*   CO2 20*  --  21*   BUN 56.5*  --  54.5*   CR 2.02*  --  1.71*   GFRESTIMATED 27*  --  32*   ANIONGAP 12  --  18*   SONIA 8.2*  --  9.1   GLC 81 95 91   ALBUMIN 3.0*  --  4.0   PROTTOTAL 5.7*  --  7.9   BILITOTAL 0.2  --  0.3   ALKPHOS 66  --  90   ALT 15  --  19   AST 16  --  23     Recent Labs   Lab Test 10/03/23  0941 02/15/22  0632   CHOL 137 124   HDL 54 48*   LDL 69 61   TRIG 68 76     Recent Labs   Lab 11/21/24 0442 11/20/24  1555   WBC 8.9 7.7   HGB 10.8* 12.7   HCT 33.0* 40.1   MCV 85 85    291     Recent Labs   Lab 11/21/24 0442 11/20/24  2251 11/20/24  1557   PHV 7.31* 7.39 7.38   PO2V 58* 50* 30   PCO2V 48 38* 42   HCO3V 24 23 24     Recent Labs   Lab 11/20/24  1555   NTBNPI 26,420*     No results for input(s): \"DD\" in the last 168 hours.  No results for input(s): \"SED\", \"CRP\" in the last 168 hours.  Recent Labs   Lab 11/21/24  0442 11/20/24  1555    291     No results for input(s): \"TSH\" in the last 168 " "hours.  No results for input(s): \"COLOR\", \"APPEARANCE\", \"URINEGLC\", \"URINEBILI\", \"URINEKETONE\", \"SG\", \"UBLD\", \"URINEPH\", \"PROTEIN\", \"UROBILINOGEN\", \"NITRITE\", \"LEUKEST\", \"RBCU\", \"WBCU\" in the last 168 hours.    Imaging:  Recent Results (from the past 48 hours)   XR Chest Port 1 View    Narrative    EXAM: XR CHEST PORT 1 VIEW  LOCATION: [facility]  DATE: 11/20/2024    INDICATION: Shortness of breath, CHF vs COPD  COMPARISON: CT chest 7/31/2024, chest radiograph 7/30/2024      Impression    IMPRESSION: Large right, moderate sized left pleural effusions with compressive atelectasis. Diffuse interstitial opacities suggest pulmonary edema. No pneumothorax. Cardiac silhouette and mediastinal contours are unchanged where seen. Aortic arch   calcifications.   US Thoracentesis    Narrative    EXAM:   1. RIGHT THORACENTESIS  2. ULTRASOUND GUIDANCE  LOCATION: [facility]  DATE: 11/20/2024    INDICATION: Pleural effusion.    PROCEDURE: Informed consent obtained. Time out performed. The chest was prepped and draped in sterile fashion. 15 mL of 1 % lidocaine was infused into the local soft tissues. Under direct ultrasound guidance, a centesis catheter system was placed into   the pleural effusion.     1.1 liters of straw-colored fluid were removed and sent to lab, if requested.    Patient tolerated procedure well.    Ultrasound imaging was obtained and placed in the patient's permanent medical record.      Impression    IMPRESSION:  Status post ultrasound-guided right thoracentesis.    Reference CPT Code: 30015   XR Chest Port 1 View    Narrative    CHEST ONE VIEW  11/21/2024 8:23 AM     HISTORY: hypoxia    COMPARISON: Chest radiograph 11/20/2024.      Impression    IMPRESSION: Decreased small to moderate right and small left pleural  effusions with adjacent atelectasis. Mild interstitial pulmonary  edema. Slightly more confluent left perihilar opacity may reflect  alveolar " edema. No convincing pneumothorax. Similar cardiomediastinal  silhouette.    SILVIO COOK MD         SYSTEM ID:  [id number]   CT Chest w/o Contrast    Narrative    CT CHEST WITHOUT CONTRAST  11/21/2024 9:49 AM    CLINICAL HISTORY: Asses for pneumonia, edema, pleural effusion.    TECHNIQUE: CT chest without IV contrast. Multiplanar reformats were  obtained. Dose reduction techniques were used.  CONTRAST: None.    COMPARISON: Chest radiograph 11/21/2024.    FINDINGS:   LUNGS AND PLEURA: Decreased small to moderate right pleural effusion  with adjacent atelectasis. New patchy consolidative opacity within the  right middle lobe and lower lobe with air bronchograms. Similar  moderate left pleural effusion with complete atelectasis of the left  lower lobe. No pneumothorax. Mild interstitial pulmonary edema.    MEDIASTINUM/AXILLAE: Prominent mediastinal lymph nodes may be  reactive. No thoracic aortic aneurysm. Hypodense thyroid nodules  measuring up to 1.1 cm. Trace pericardial fluid.    CORONARY ARTERY CALCIFICATION: Severe.    UPPER ABDOMEN: Trace perisplenic ascites. Cholecystectomy.    MUSCULOSKELETAL: No aggressive osseous lesion. Remote left rib  fracture.      Impression    IMPRESSION:   1.  Small to moderate right pleural effusion with adjacent  atelectasis. New patchy consolidative opacity within the right middle  lobe and lower lobe concerning for superimposed pneumonia with  possible component of reexpansion edema.  2.  Moderate left pleural effusion with complete atelectasis of the  left lower lobe.  3.  Mild interstitial pulmonary edema.       Echo:  No results found for this or any previous visit (from the past 4320 hours).    Clinically Significant Risk Factors Present on Admission         # Hyponatremia: Lowest Na = 131 mmol/L in last 2 days, will monitor as appropriate  # Hypochloremia: Lowest Cl = 94 mmol/L in last 2 days, will monitor as appropriate      # Hypoalbuminemia: Lowest albumin = 3  g/dL at 11/21/2024  4:42 AM, will monitor as appropriate   # Drug Induced Platelet Defect: home medication list includes an antiplatelet medication   # Hypertension: Noted on problem list     # Acute Hypoxic Respiratory Failure: Documented O2 saturation < 90%. Continue supplemental oxygen as needed   # Anemia: based on hgb <11           # Financial/Environmental Concerns:

## 2024-11-21 NOTE — PRE-PROCEDURE
GENERAL PRE-PROCEDURE:   Procedure:  Thora  Date/Time:  11/20/2024 6:12 PM    Written consent obtained?: Yes    Risks and benefits: Risks, benefits and alternatives were discussed    Consent given by:  Patient  Patient states understanding of procedure being performed: Yes    Patient's understanding of procedure matches consent: Yes    Procedure consent matches procedure scheduled: Yes    Lungs:  Wheezes  History & Physical reviewed:  History and physical reviewed and no updates needed  Statement of review:  I have reviewed the lab findings, diagnostic data, medications, and the plan for sedation

## 2024-11-21 NOTE — PHARMACY-ADMISSION MEDICATION HISTORY
Pharmacist Admission Medication History    Admission medication history is complete. The information provided in this note is only as accurate as the sources available at the time of the update.    Information Source(s): Patient and CareEverywhere/SureScripts via in-person    Pertinent Information: Patient no longer takes Triphrocaps, Xarelto, and spironolactone    Changes made to PTA medication list:  Added: None  Deleted: albuterol inhaler, lomotil, miconazole powder  Changed: None    Allergies reviewed with patient and updates made in EHR: no completed prior to this interview    Medication History Completed By: Kiersten Jaquez RPH 11/20/2024 6:57 PM    PTA Med List   Medication Sig Last Dose/Taking    acetaminophen (TYLENOL) 500 MG tablet Take 1-2 tablets (500-1,000 mg) by mouth every 6 hours as needed for mild pain Taking As Needed    amLODIPine (NORVASC) 10 MG tablet Take 1 tablet (10 mg) by mouth daily 11/20/2024 Morning    atenolol (TENORMIN) 25 MG tablet Take 1 tablet (25 mg) by mouth daily. 11/20/2024 Morning    budesonide-formoterol (SYMBICORT) 160-4.5 MCG/ACT Inhaler Inhale 2 puffs into the lungs two times daily Disp 3 inhalers 11/20/2024 Morning    clopidogrel (PLAVIX) 75 MG tablet Take 1 tablet (75 mg) by mouth daily. 11/20/2024 Morning    empagliflozin (JARDIANCE) 10 MG TABS tablet Take 1 tablet (10 mg) by mouth daily. 11/20/2024 Morning    gabapentin (NEURONTIN) 100 MG capsule Take 2 capsules (200 mg) by mouth at bedtime 11/19/2024 Bedtime    hydrALAZINE (APRESOLINE) 50 MG tablet Take 1 tablet (50 mg) by mouth 3 times daily. 11/20/2024 Morning    isosorbide mononitrate (IMDUR) 60 MG 24 hr tablet Take 1 tablet (60 mg) by mouth daily. 11/20/2024 Morning    nitroGLYcerin (NITROSTAT) 0.4 MG sublingual tablet Place 1 tablet (0.4 mg) under the tongue See Admin Instructions for chest pain Taking    rosuvastatin (CRESTOR) 10 MG tablet Take 1 tablet (10 mg) by mouth at bedtime 11/19/2024 Bedtime    torsemide  (DEMADEX) 10 MG tablet Take 30 mg by mouth daily. 11/20/2024 Morning

## 2024-11-21 NOTE — H&P
[facility]    History and Physical - Hospitalist Service       Date of Admission:  11/20/2024    Assessment & Plan      Shirley Hendricks is a 64 year old female, on Plavix, with a history of COPD, hypertension, CHF, peripheral artery disease, NSTEMI, stage 4 CKD, and tobacco use who is being admitted for evaluation of shortness of breath.     Acute hypoxic respiratory failure   Acute on chronic diastolic heart failure in exacerbation:  Moderate to severe left pleural effusion status post thoracentesis  Type II NSTEMI likely due to demand due to heart failure exacerbation  Assessment: Presents with 2-day history of increasing shortness of breath and a cough.  On admission chest x-ray shows a large left pleural effusion with compressive atelectasis.  There is also diffuse interstitial opacities consistent with pulmonary edema.  No pneumothorax was noted.  Labs are pertinent for a proBNP of 26,004-20, mildly elevated point of 98, creatinine 1.71. PTA on Norvasc 10 mg daily, atenolol 25 mg daily, torsemide 50 mg daily, Jardiance 10 mg daily  Plan:  - Admit to inpatient  - Ultrasound guided thoracentesis ordered  - Monitor on telemetry  - Follow daily weights/I's and O's  - Cardiac diet  - Received IV Lasix 60 mg x 1 in the ED, will continue IV Lasix 60 mg twice daily  - Continue BiPAP, wean as able  - Continuous oximetry  - Cardiology Consulted  - Continue PTA jardiance 10 mg daily   - Continue PTA Atenolol  - Continue PTA Imdur 60 mg daily      Hypertension  Hyperlipidemia  Coronary artery disease with history of MI in 2019  Peripheral vascular disease with history of 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 2024  Assessment: PTA on amlodipine 10 mg, Coreg changed to atenolol 25 mg daily, hydralazine 50 mg 3 times daily, Imdur 60 mg daily, Crestor 10 mg  Plan:  - Continue with amlodipine 10 mg, Coreg changed to atenolol 25 mg daily, hydralazine 50 mg 3 times daily, Imdur 60 mg daily, Crestor 10 mg     CKD stage  III  Assessment: Creatinine on admission of 1.71, which is within her baseline.  Plan:  -Daily BMP while on IV diuresis  -Avoid NSAIDs/nephrotoxins     History of COPD not in exacerbation  - Continue with Breo and as needed albuterol nebs available     GERD  - Continue with PTA famotidine     Anemia of Chronic Disease  Baseline hemoglobin has been between 8-9 over the past few months  Plan:  - Hemoglobin seems to be stable at 12.7 with no evidence of acute blood loss on admission     History of thrombocytopenia  - Platelet count normal on admission     Tobacco use  - Continue with nicotine patch          Diet: NPO for Medical/Clinical Reasons Except for: Meds, Ice ChipsLow Sodium Diet  DVT Prophylaxis: Pneumatic Compression Devices  Alvarez Catheter: Not present  Lines: None     Cardiac Monitoring: ACTIVE order. Indication: Acute decompensated heart failure (48 hours)  Code Status: Full CodeFULL CODE    Clinically Significant Risk Factors Present on Admission         # Hyponatremia: Lowest Na = 133 mmol/L in last 2 days, will monitor as appropriate  # Hypochloremia: Lowest Cl = 94 mmol/L in last 2 days, will monitor as appropriate        # Drug Induced Platelet Defect: home medication list includes an antiplatelet medication   # Hypertension: Noted on problem list       # Acute Hypoxic Respiratory Failure: Documented O2 saturation < 90%. Continue supplemental oxygen as needed            # Financial/Environmental Concerns:           Disposition Plan     Medically Ready for Discharge: Anticipated in 2-4 Days           Marcin White MD  Hospitalist Service  [facility]  Securely message with [employer] (more info)  Text page via [employer] Paging/Directory     ______________________________________________________________________    Chief Complaint     SOB    History is obtained from the patient    History of Present Illness     Shirley Hendricks is a 64 year old female, on Plavix, with a history of  COPD, hypertension, CHF, peripheral artery disease, NSTEMI, stage 4 CKD, and tobacco use who is being admitted for evaluation of shortness of breath.     Patient reports that for the past 2 days she has felt increasingly short of breath and developed a mild productive cough.  Ultimately given the constellation of her symptoms and concern she activated EMS for further assistance where she was found to be hypoxic in the 80s on room air and was started on nonrebreather mask before transferring to the ED.  She does endorse some mild diffuse chest tenderness due to her cough, but denies any fevers at home.  She has no chills.  Denies any nausea/vomiting or abdominal pain.  Denies any recent diarrhea or bloody stools, weight loss or night sweats.  She reports that she is still smoking cigarettes on a daily basis.  She otherwise has no calf pain or leg swelling.  At baseline she is on torsemide and she reports that she has been compliant with her diuretics and heart failure regimen.  She otherwise has no other complaints at this time.    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 clot    Post-menopausal     Reflux esophagitis 02/2004     EGD: esophagitis and medium HH    SBO (small bowel obstruction) (H) 08/10/2021    SVT (supraventricular tachycardia) (H)     Thrombocytopenia (H)     Uncomplicated asthma     Vitamin C deficiency 08/12/2018       Past Surgical History   Past Surgical History:   Procedure Laterality Date    AMPUTATE LEG ABOVE KNEE N/A 4/1/2024    Procedure: AMPUTATION, ABOVE KNEE right leg with wound vac dressing, excision of anteriotibial bypass graft, closure of (previous interventional radiology) left groin incision;  Surgeon: José Luis Hernandez MD;  Location:  OR    AMPUTATE LEG ABOVE KNEE Right 4/9/2024    Procedure: COMPLETION RIGHT ABOVE KNEE AMPUTATION;  Surgeon: José Luis Hernandez MD;  Location:  OR    ANGIOGRAM      ANGIOGRAM Right 6/23/2021    Procedure: RIGHT LOWER EXTREMITY ANGIOGRAM WITH LEFT BRACHIAL ARTERY CUTDOWN;  Surgeon: José Luis Hernandez MD;  Location:  OR    APPLY WOUND VAC Right 5/16/2024    Procedure: PLACEMENT OF WOUND VAC TO RIGHT ABOVE KNEE AMPUTATION;  Surgeon: Tay Enciso MD;  Location:  OR    APPLY WOUND VAC N/A 5/20/2024    Procedure: AND PLACEMENT OF WOUND VAC;  Surgeon: José Luis Hernandez MD;  Location:  OR    BIOPSY MASS NECK Right 10/11/2023    Procedure: Right Parotid Biopsy;  Surgeon: Randal Mendoza MD;  Location:  OR    BRONCHOSCOPY FLEXIBLE AND RIGID N/A 6/26/2024    Procedure: Bronchoscopy flexible and rigid;  Surgeon: Rod Nath MD;  Location:  GI    BYPASS GRAFT FEMOROPOPLITEAL Right 09/23/2020    Procedure: RIGHT FEMORAL GRAFT TO ABOVE-KNEE POPLITEAL BYPASS USING CADAVERIC SUPERFICIAL FEMORAL ARTERY;  Surgeon: Chadwick Lozano MD;  Location:  OR    BYPASS GRAFT FEMOROPOPLITEAL Right 2/15/2022    Procedure: RIGHT SUPERFICIAL FEMORAL ARTERY GRAFT TO BELOW KNEE POPLITEAL BYPASS WITH CADAVERIC CRYOLIFE  FEMORAL-POPLITEAL ARTERY SIZE: OUTER DIAMETER: 7MM - 6MM;  Surgeon: Chadwick Lozano;  Location:  OR    BYPASS GRAFT  FEMOROPOPLITEAL Right 5/26/2023    Procedure: RIGHT DISTAL SUPERFICIAL FEMORAL GRAFT TO ANTERIOR TIBIAL ARTERY WITH 26 CENTIMETER CADAVERIC SUPERFICIAL FEMORAL ARTERY GRAFT;  Surgeon: Chadwick Lozano MD;  Location:  OR    BYPASS GRAFT FEMOROPOPLITEAL Right 10/11/2023    Procedure: RIGHT FEMORAL TO POPLITEAL GRAFT THROMBECTOMY;  Surgeon: Chadwick Lozano MD;  Location:  OR    BYPASS GRAFT INSITU FEMOROPOPLITEAL Right 7/7/2021    Procedure: CREATION RIGHT FEMORAL TO POPLITEAL ARTERIAL BYPASS USING INSITU VEIN GRAFT;  Surgeon: José Luis Hernandez MD;  Location:  OR    CARDIAC CATHERIZATION  09/03/2009    multivessel CAD without target lesions, med mgmt indicated, preserved ef    CARPAL TUNNEL RELEASE RT/LT Right 05/20/2021    COLONOSCOPY N/A 12/12/2023    Procedure: Colonoscopy;  Surgeon: Corey Hoffman MD;  Location:  GI    COLONOSCOPY N/A 12/14/2023    Procedure: Colonoscopy;  Surgeon: Corey Hoffman MD;  Location:  GI    CV CORONARY ANGIOGRAM N/A 10/4/2023    Procedure: Coronary Angiogram;  Surgeon: Rogelio Ricks MD;  Location:  HEART CARDIAC CATH LAB    CV PCI N/A 10/4/2023    Procedure: Percutaneous Coronary Intervention;  Surgeon: Rogelio Ricks MD;  Location:  HEART CARDIAC CATH LAB    EMBOLECTOMY LOWER EXTREMITY Right 10/6/2023    Procedure: Right anterior tibial bypass with graft, Right tibial endarterectomy,thrombectomy, Right doraslis pedis thrombectomy, Anterior Tibial vein patch.;  Surgeon: Chadwick Lozano MD;  Location:  OR    ENDARTERECTOMY CAROTID Right 05/11/2016    Procedure: ENDARTERECTOMY CAROTID;  Surgeon: Chadwick Lozano MD;  Location:  OR    ENDARTERECTOMY CAROTID Left 06/08/2020    Procedure: LEFT CAROTID ENDARTERECTOMY with distal facal vein patch  and EEG;  Surgeon: Chadwick Lozano MD;  Location:  OR    ESOPHAGOSCOPY, GASTROSCOPY, DUODENOSCOPY (EGD), COMBINED N/A 12/12/2023    Procedure:  Esophagoscopy, gastroscopy, duodenoscopy (EGD), combined;  Surgeon: Corey Hoffman MD;  Location:  GI    FINE NEEDLE ASPIRATION WITHOUT IMAGING GUIDANCE Right 9/22/2023    Procedure: Fine needle aspiration without imaging guidance;  Surgeon: Kiersten Aguilera MD;  Location: [facility] GI    FLUORESCENCE INTRAOPERATIVE VASCULAR ANGIOGRAPHY Right 12/28/2022    Procedure: RIGHT LEG ANGIOGRAM with intervention;  Surgeon: Chadwick Lozano MD;  Location:  OR    GYN SURGERY  left tube    left salpingectomy    IR ANGIOGRAM THROUGH CATHETER (ARTERIAL)  10/6/2023    IR ANGIOGRAM THROUGH CATHETER (ARTERIAL)  10/6/2023    IR ANGIOGRAM THROUGH CATHETER (ARTERIAL)  3/31/2024    IR ANGIOGRAM THROUGH CATHETER (ARTERIAL)  3/30/2024    IR CHEST TUBE PLACEMENT NON-TUNNELLED LEFT  6/23/2024    IR CVC TUNNEL PLACEMENT > 5 YRS OF AGE  4/3/2024    IR CVC TUNNEL PLACEMENT > 5 YRS OF AGE  6/23/2024    IR CVC TUNNEL REMOVAL RIGHT  5/28/2024    IR CVC TUNNEL REMOVAL RIGHT  7/3/2024    IR CVC TUNNEL REVISION RIGHT  4/15/2024    IR LOWER EXTREMITY ANGIOGRAM RIGHT  05/25/2021    IR LOWER EXTREMITY ANGIOGRAM RIGHT  10/5/2023    IR LOWER EXTREMITY ANGIOGRAM RIGHT  3/29/2024    IR OR ANGIOGRAM  6/23/2021    IR OR ANGIOGRAM  12/28/2022    IRRIGATION AND DEBRIDEMENT LOWER EXTREMITY, COMBINED Right 5/16/2024    Procedure: IRRIGATION AND DEBRIDEMENT OF RIGHT ABOVE KNEE AMPUTATION;  Surgeon: Tay Enciso MD;  Location:  OR    IRRIGATION AND DEBRIDEMENT LOWER EXTREMITY, COMBINED Right 5/20/2024    Procedure: IRRIGATION AND DEBRIDMENT RIGHT LOWER EXTREMITY;  Surgeon: José Luis Hernandez MD;  Location:  OR    LAPAROSCOPIC CHOLECYSTECTOMY N/A 09/27/2017    Procedure: LAPAROSCOPIC CHOLECYSTECTOMY;  LAPAROSCOPIC CHOLECYSTECTOMY;  Surgeon: Jacoby Tapia MD;  Location:  SD    LAPAROSCOPY DIAGNOSTIC (GENERAL) N/A 8/11/2021    Procedure: Exploratory laparoscopy,  laparoscopic lysis of adhesions, laparotomy;  Surgeon: Jhon  Corina LEIJA MD;  Location:  OR    OR ANGIOGRAM, LOWER EXTREMITY Right 10/11/2023    Procedure: Or Angiogram, Lower Extremity;  Surgeon: Chadwick Lozano MD;  Location:  OR    ORTHOPEDIC SURGERY      left knee surgery    REPAIR ANEURYSM FEMORAL ARTERY Left 12/28/2022    Procedure: REPAIR LEFT FEMORAL PSEUDOANEURYSM;  Surgeon: Chadwick Lozano MD;  Location:  OR    VASCULAR SURGERY  aoto bi fem  left fem-AK pop    [facility] FABRIC WRAPPING OF ABDOMINAL ANEURYSM      [facility] NONSPECIFIC PROCEDURE  12/2000    angioplasty with stent right fem. a.    [facility] NONSPECIFIC PROCEDURE  1987    sinus surgery    [facility] NONSPECIFIC PROCEDURE  09/02/2009    Emergent left groin exploration with oversewing of bleeding angiographic site. 2. Endarterectomy of common femoral-proximal superficial femoral artery with greater saphenous vein patch angioplasty.   a. [place] of accessory right greater saphenous vein.     [facility] NONSPECIFIC PROCEDURE  08/27/2009    occluded left common iliac and external iliac arteries were successfully revascularized with stenting to 8 and 7 mm        Prior to Admission Medications   Prior to Admission Medications   Prescriptions Last Dose Informant Patient Reported? Taking?   acetaminophen (TYLENOL) 500 MG tablet   No Yes   Sig: Take 1-2 tablets (500-1,000 mg) by mouth every 6 hours as needed for mild pain   amLODIPine (NORVASC) 10 MG tablet 11/20/2024 Morning  No Yes   Sig: Take 1 tablet (10 mg) by mouth daily   atenolol (TENORMIN) 25 MG tablet 11/20/2024 Morning  No Yes   Sig: Take 1 tablet (25 mg) by mouth daily.   budesonide-formoterol (SYMBICORT) 160-4.5 MCG/ACT Inhaler 11/20/2024 Morning  No Yes   Sig: Inhale 2 puffs into the lungs two times daily Disp 3 inhalers   clopidogrel (PLAVIX) 75 MG tablet 11/20/2024 Morning  No Yes   Sig: Take 1 tablet (75 mg) by mouth daily.   empagliflozin (JARDIANCE) 10 MG TABS tablet 11/20/2024 Morning  No Yes   Sig: Take 1 tablet (10 mg) by mouth daily.   gabapentin  (NEURONTIN) 100 MG capsule 11/19/2024 Bedtime  No Yes   Sig: Take 2 capsules (200 mg) by mouth at bedtime   hydrALAZINE (APRESOLINE) 50 MG tablet 11/20/2024 Morning  Yes Yes   Sig: Take 1 tablet (50 mg) by mouth 3 times daily.   isosorbide mononitrate (IMDUR) 60 MG 24 hr tablet 11/20/2024 Morning  No Yes   Sig: Take 1 tablet (60 mg) by mouth daily.   nitroGLYcerin (NITROSTAT) 0.4 MG sublingual tablet  Self No Yes   Sig: Place 1 tablet (0.4 mg) under the tongue See Admin Instructions for chest pain   rosuvastatin (CRESTOR) 10 MG tablet 11/19/2024 Bedtime  No Yes   Sig: Take 1 tablet (10 mg) by mouth at bedtime   torsemide (DEMADEX) 10 MG tablet 11/20/2024 Morning  Yes Yes   Sig: Take 30 mg by mouth daily.      Facility-Administered Medications: None        Review of Systems    The 10 point Review of Systems is negative other than noted in the HPI or here.     Social History   I have reviewed this patient's social history and updated it with pertinent information if needed.  Social History     Tobacco Use    Smoking status: Former     Current packs/day: 0.25     Average packs/day: 0.3 packs/day for 56.9 years (14.2 ttl pk-yrs)     Types: Cigarettes     Start date: 1968    Smokeless tobacco: Never    Tobacco comments:     1/2 PPD. 1-3 cigs a day   Vaping Use    Vaping status: Never Used   Substance Use Topics    Alcohol use: Not Currently     Comment: x1-2 yr    Drug use: Yes     Types: Marijuana     Comment: 2x per day         Family History   I have reviewed this patient's family history and updated it with pertinent information if needed.  Family History   Problem Relation Age of Onset    Cancer Mother         bladder    Cardiovascular Father         alive,multiple heart attacks    Diabetes Maternal Grandmother     Lung Cancer Maternal Grandmother     Blood Disease Brother         clotting disorder         Allergies   Allergies   Allergen Reactions    Contrast Dye Anaphylaxis     Pt reported facial and throat  swelling with prior CT contrast    Pantoprazole      Protonix caused diffuse edema    Chantix [Varenicline]      Terrible dreams    Gluten Meal GI Disturbance     Pt has celiac disease    Penicillins Itching and Rash     ------------------------------------------------------------------------     Physical Exam   Vital Signs: Temp: 97.8  F (36.6  C) Temp src: Oral BP: (!) 179/78 Pulse: 65   Resp: 22 SpO2: 91 % O2 Device: Oxymask Oxygen Delivery: 10 LPM  Weight: 100 lbs 0 oz    Constitutional: awake, alert, cooperative, no apparent distress.   Eyes: Lids and lashes normal, pupils equal, round and reactive to light   ENT: Normocephalic, without obvious abnormality, atraumatic, sinuses nontender on palpation   Hematologic / Lymphatic: no cervical lymphadenopathy   Respiratory: Coarse breath sounds along with bibasilar crackles.  Cardiovascular: RRR with no m/r/g.  There is bilateral pitting lower extremity edema.  GI: Normal bowel sounds, soft, non-distended, non-tender. Skin: normal skin color, texture, turgor   Musculoskeletal: There is no redness, warmth, or swelling of the joints. Full range of motion noted.   Neurologic: Awake, alert, oriented to name, place and time. Cranial nerves II-XII are grossly intact. Motor is 5 out of 5 bilaterally. Sensory is intact.   Neuropsychiatric: normal mood and affect    ----------------------------------------------------------------------------------------------------------------------------------    Medical Decision Making       75 MINUTES SPENT BY ME on the date of service doing chart review, history, exam, documentation & further activities per the note.      Data   ------------------------- PAST 24 HR DATA REVIEWED -----------------------------------------------    I have personally reviewed the following data over the past 24 hrs:    7.7  \   12.7   / 291     133 (L) 94 (L) 54.5 (H) /  91   4.3 21 (L) 1.71 (H) \     ALT: 19 AST: 23 AP: 90 TBILI: 0.3   ALB: 4.0 TOT PROTEIN:  7.9 LIPASE: N/A     Trop: 87 (H) BNP: 26,420 (H)     Procal: N/A CRP: N/A Lactic Acid: 0.6         Imaging results reviewed over the past 24 hrs:   Recent Results (from the past 24 hours)   XR Chest Port 1 View    Narrative    EXAM: XR CHEST PORT 1 VIEW  LOCATION: [facility]  DATE: 11/20/2024    INDICATION: Shortness of breath, CHF vs COPD  COMPARISON: CT chest 7/31/2024, chest radiograph 7/30/2024      Impression    IMPRESSION: Large right, moderate sized left pleural effusions with compressive atelectasis. Diffuse interstitial opacities suggest pulmonary edema. No pneumothorax. Cardiac silhouette and mediastinal contours are unchanged where seen. Aortic arch   calcifications.   US Thoracentesis    Narrative    EXAM:   1. RIGHT THORACENTESIS  2. ULTRASOUND GUIDANCE  LOCATION: [facility]  DATE: 11/20/2024    INDICATION: Pleural effusion.    PROCEDURE: Informed consent obtained. Time out performed. The chest was prepped and draped in sterile fashion. 15 mL of 1 % lidocaine was infused into the local soft tissues. Under direct ultrasound guidance, a centesis catheter system was placed into   the pleural effusion.     1.1 liters of straw-colored fluid were removed and sent to lab, if requested.    Patient tolerated procedure well.    Ultrasound imaging was obtained and placed in the patient's permanent medical record.      Impression    IMPRESSION:  Status post ultrasound-guided right thoracentesis.    Reference CPT Code: 86770     ------------------------- ENCOUNTER LABS ----------------------------------------------------------------  Recent Labs   Lab 11/20/24  1555   WBC 7.7   HGB 12.7   MCV 85      *   POTASSIUM 4.3   CHLORIDE 94*   CO2 21*   BUN 54.5*   CR 1.71*   ANIONGAP 18*   SONIA 9.1   GLC 91   ALBUMIN 4.0   PROTTOTAL 7.9   BILITOTAL 0.3   ALKPHOS 90   ALT 19   AST 23       Most Recent 3 CBC's:  Recent Labs   Lab Test 11/20/24  7805  08/06/24  0708 08/05/24  1126 08/04/24  1055   WBC 7.7  --  5.0 5.7   HGB 12.7  --  8.5* 8.3*   MCV 85  --  96 99    189 177 156     Most Recent 3 BMP's:  Recent Labs   Lab Test 11/20/24  1555 10/14/24  1403 08/16/24  1036   * 137 134*   POTASSIUM 4.3 6.0* 4.3   CHLORIDE 94* 104 97*   CO2 21* 23 24   BUN 54.5* 40.6* 58.3*   CR 1.71* 1.47* 1.60*   ANIONGAP 18* 10 13   SONIA 9.1 9.4 9.0   GLC 91 86 56*     Most Recent 2 LFT's:  Recent Labs   Lab Test 11/20/24  1555 07/30/24  1233   AST 23 28   ALT 19 17   ALKPHOS 90 57   BILITOTAL 0.3 0.3     Most Recent 3 INR's:  Recent Labs   Lab Test 04/11/24  0643 10/07/23  0516 10/06/23  0551   INR 1.31* 1.38* 1.08     Most Recent 3 Troponin's:  Recent Labs   Lab Test 06/10/20  1243 02/16/20  1824 09/18/19  0739   TROPI <0.015 <0.015 <0.015     Most Recent 3 BNP's:  Recent Labs   Lab Test 11/20/24  1555 07/30/24  1233 07/21/24  0050   NTBNPI 26,420* 32,028* 7,421*     Most Recent D-dimer:  Recent Labs   Lab Test 07/30/24  1233   DD 12.92*     Most Recent Cholesterol Panel:  Recent Labs   Lab Test 10/03/23  0941   CHOL 137   LDL 69   HDL 54   TRIG 68     Most Recent 6 Bacteria Isolates From Any Culture (See EPIC Reports for Culture Details):No lab results found.  Most Recent TSH and T4:  Recent Labs   Lab Test 06/22/24  0541 02/04/19  0934 03/28/17  0922   TSH 2.07   < > 1.37   T4  --   --  1.19    < > = values in this interval not displayed.     Most Recent Urinalysis:  Recent Labs   Lab Test 05/03/24  1319 02/04/19  0935   COLOR Orange* Yellow   APPEARANCE Cloudy* Clear   URINEGLC Negative Negative   URINEBILI Negative Negative   URINEKETONE Negative Negative   SG 1.018 1.010   UBLD Small* Negative   URINEPH 6.0 6.0   PROTEIN 200* Negative   UROBILINOGEN  --  0.2   NITRITE Negative Negative   LEUKEST Large* Negative   RBCU 17* O - 2   WBCU >182* 0 - 5     Most Recent ESR & CRP:  Recent Labs   Lab Test 11/13/23  1705   SED 39*

## 2024-11-21 NOTE — CONSULTS
PULMONARY CONSULT  Date of service: 2024    Patient: Shirley Hendricks      : 1960      MRN: [id number]         Impressions/Recommendations:     #B/l pleural effusion:  -Right lower lobe infiltrates.  -It can be rexpansion pulmonry edema.  -  CT chest today showed normal lung parenchyma but b/l effusion.  -Will reorder b/l thoracntesis which should help.  - Also ordered 40 daily soluemedrol.  - Agree with starting antibiotics for cap.       Armen Magdaleno MD  Pulmonary & Critical Care            History of Present Illness:   Shirley Hendricks is a 64 year old female, on Plavix, with a history of COPD, hypertension, CHF, peripheral artery disease, NSTEMI, stage 4 CKD, and tobacco use who is being admitted for evaluation of shortness of breath.           Review of Symptoms:   10-point ROS reviewed, & found negative w/ exceptions noted in the HPI.          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 clot    Post-menopausal     Reflux esophagitis 2004    EGD: esophagitis and medium HH    SBO (small bowel obstruction) (H) 08/10/2021    SVT (supraventricular tachycardia) (H)     Thrombocytopenia (H)      Uncomplicated asthma     Vitamin C deficiency 08/12/2018       Past Surgical History:   Procedure Laterality Date    AMPUTATE LEG ABOVE KNEE N/A 4/1/2024    Procedure: AMPUTATION, ABOVE KNEE right leg with wound vac dressing, excision of anteriotibial bypass graft, closure of (previous interventional radiology) left groin incision;  Surgeon: José Luis Hernandez MD;  Location: [facility] OR    AMPUTATE LEG ABOVE KNEE Right 4/9/2024    Procedure: COMPLETION RIGHT ABOVE KNEE AMPUTATION;  Surgeon: José Luis Hernandez MD;  Location:  OR    ANGIOGRAM      ANGIOGRAM Right 6/23/2021    Procedure: RIGHT LOWER EXTREMITY ANGIOGRAM WITH LEFT BRACHIAL ARTERY CUTDOWN;  Surgeon: José Luis Hernandez MD;  Location:  OR    APPLY WOUND VAC Right 5/16/2024    Procedure: PLACEMENT OF WOUND VAC TO RIGHT ABOVE KNEE AMPUTATION;  Surgeon: Tay Enciso MD;  Location:  OR    APPLY WOUND VAC N/A 5/20/2024    Procedure: AND PLACEMENT OF WOUND VAC;  Surgeon: José Luis Hernandez MD;  Location:  OR    BIOPSY MASS NECK Right 10/11/2023    Procedure: Right Parotid Biopsy;  Surgeon: Randal Mendoza MD;  Location:  OR    BRONCHOSCOPY FLEXIBLE AND RIGID N/A 6/26/2024    Procedure: Bronchoscopy flexible and rigid;  Surgeon: Rod Nath MD;  Location:  GI    BYPASS GRAFT FEMOROPOPLITEAL Right 09/23/2020    Procedure: RIGHT FEMORAL GRAFT TO ABOVE-KNEE POPLITEAL BYPASS USING CADAVERIC SUPERFICIAL FEMORAL ARTERY;  Surgeon: Chadwick Lozano MD;  Location:  OR    BYPASS GRAFT FEMOROPOPLITEAL Right 2/15/2022    Procedure: RIGHT SUPERFICIAL FEMORAL ARTERY GRAFT TO BELOW KNEE POPLITEAL BYPASS WITH CADAVERIC CRYOLIFE  FEMORAL-POPLITEAL ARTERY SIZE: OUTER DIAMETER: 7MM - 6MM;  Surgeon: Chadwick Lozano;  Location:  OR    BYPASS GRAFT FEMOROPOPLITEAL Right 5/26/2023    Procedure: RIGHT DISTAL SUPERFICIAL FEMORAL GRAFT TO ANTERIOR TIBIAL ARTERY WITH 26 CENTIMETER CADAVERIC SUPERFICIAL FEMORAL ARTERY GRAFT;  Surgeon:  Chadwick Lozano MD;  Location:  OR    BYPASS GRAFT FEMOROPOPLITEAL Right 10/11/2023    Procedure: RIGHT FEMORAL TO POPLITEAL GRAFT THROMBECTOMY;  Surgeon: Chadwick Lozano MD;  Location:  OR    BYPASS GRAFT INSITU FEMOROPOPLITEAL Right 7/7/2021    Procedure: CREATION RIGHT FEMORAL TO POPLITEAL ARTERIAL BYPASS USING INSITU VEIN GRAFT;  Surgeon: José Luis Hernandez MD;  Location:  OR    CARDIAC CATHERIZATION  09/03/2009    multivessel CAD without target lesions, med mgmt indicated, preserved ef    CARPAL TUNNEL RELEASE RT/LT Right 05/20/2021    COLONOSCOPY N/A 12/12/2023    Procedure: Colonoscopy;  Surgeon: Corey Hoffman MD;  Location:  GI    COLONOSCOPY N/A 12/14/2023    Procedure: Colonoscopy;  Surgeon: Corey Hoffman MD;  Location:  GI    CV CORONARY ANGIOGRAM N/A 10/4/2023    Procedure: Coronary Angiogram;  Surgeon: Rogelio Ricks MD;  Location:  [facility]    CV PCI N/A 10/4/2023    Procedure: Percutaneous Coronary Intervention;  Surgeon: Rogelio Ricks MD;  Location:  [facility]    EMBOLECTOMY LOWER EXTREMITY Right 10/6/2023    Procedure: Right anterior tibial bypass with graft, Right tibial endarterectomy,thrombectomy, Right doraslis pedis thrombectomy, Anterior Tibial vein patch.;  Surgeon: Chadwick Lozano MD;  Location:  OR    ENDARTERECTOMY CAROTID Right 05/11/2016    Procedure: ENDARTERECTOMY CAROTID;  Surgeon: Chadwick Lozano MD;  Location:  OR    ENDARTERECTOMY CAROTID Left 06/08/2020    Procedure: LEFT CAROTID ENDARTERECTOMY with distal facal vein patch  and EEG;  Surgeon: Chadwick Lozano MD;  Location:  OR    ESOPHAGOSCOPY, GASTROSCOPY, DUODENOSCOPY (EGD), COMBINED N/A 12/12/2023    Procedure: Esophagoscopy, gastroscopy, duodenoscopy (EGD), combined;  Surgeon: Corey Hoffman MD;  Location:  GI    FINE NEEDLE ASPIRATION WITHOUT IMAGING GUIDANCE Right 9/22/2023    Procedure:  Fine needle aspiration without imaging guidance;  Surgeon: Kiersten Aguilera MD;  Location: [facility]    FLUORESCENCE INTRAOPERATIVE VASCULAR ANGIOGRAPHY Right 12/28/2022    Procedure: RIGHT LEG ANGIOGRAM with intervention;  Surgeon: Chadwick Lozano MD;  Location:  OR    GYN SURGERY  left tube    left salpingectomy    IR ANGIOGRAM THROUGH CATHETER (ARTERIAL)  10/6/2023    IR ANGIOGRAM THROUGH CATHETER (ARTERIAL)  10/6/2023    IR ANGIOGRAM THROUGH CATHETER (ARTERIAL)  3/31/2024    IR ANGIOGRAM THROUGH CATHETER (ARTERIAL)  3/30/2024    IR CHEST TUBE PLACEMENT NON-TUNNELLED LEFT  6/23/2024    IR CVC TUNNEL PLACEMENT > 5 YRS OF AGE  4/3/2024    IR CVC TUNNEL PLACEMENT > 5 YRS OF AGE  6/23/2024    IR CVC TUNNEL REMOVAL RIGHT  5/28/2024    IR CVC TUNNEL REMOVAL RIGHT  7/3/2024    IR CVC TUNNEL REVISION RIGHT  4/15/2024    IR LOWER EXTREMITY ANGIOGRAM RIGHT  05/25/2021    IR LOWER EXTREMITY ANGIOGRAM RIGHT  10/5/2023    IR LOWER EXTREMITY ANGIOGRAM RIGHT  3/29/2024    IR OR ANGIOGRAM  6/23/2021    IR OR ANGIOGRAM  12/28/2022    IRRIGATION AND DEBRIDEMENT LOWER EXTREMITY, COMBINED Right 5/16/2024    Procedure: IRRIGATION AND DEBRIDEMENT OF RIGHT ABOVE KNEE AMPUTATION;  Surgeon: Tay Enciso MD;  Location:  OR    IRRIGATION AND DEBRIDEMENT LOWER EXTREMITY, COMBINED Right 5/20/2024    Procedure: IRRIGATION AND DEBRIDMENT RIGHT LOWER EXTREMITY;  Surgeon: José Luis Hernandez MD;  Location:  OR    LAPAROSCOPIC CHOLECYSTECTOMY N/A 09/27/2017    Procedure: LAPAROSCOPIC CHOLECYSTECTOMY;  LAPAROSCOPIC CHOLECYSTECTOMY;  Surgeon: Jacoby Tapia MD;  Location:  [facility]    LAPAROSCOPY DIAGNOSTIC (GENERAL) N/A 8/11/2021    Procedure: Exploratory laparoscopy,  laparoscopic lysis of adhesions, laparotomy;  Surgeon: Corina Ferreira MD;  Location: [facility]    OR ANGIOGRAM, LOWER EXTREMITY Right 10/11/2023    Procedure: Or Angiogram, Lower Extremity;  Surgeon: Chadwick Lozano MD;  Location: [facility]     ORTHOPEDIC SURGERY      left knee surgery    REPAIR ANEURYSM FEMORAL ARTERY Left 12/28/2022    Procedure: REPAIR LEFT FEMORAL PSEUDOANEURYSM;  Surgeon: Chadwick Lozano MD;  Location: [facility] OR    VASCULAR SURGERY  aoto bi fem  left fem-AK pop    [facility] FABRIC WRAPPING OF ABDOMINAL ANEURYSM      [facility] NONSPECIFIC PROCEDURE  12/2000    angioplasty with stent right fem. a.    [facility] NONSPECIFIC PROCEDURE  1987    sinus surgery    [facility] NONSPECIFIC PROCEDURE  09/02/2009    Emergent left groin exploration with oversewing of bleeding angiographic site. 2. Endarterectomy of common femoral-proximal superficial femoral artery with greater saphenous vein patch angioplasty.   a. [place] of accessory right greater saphenous vein.     [facility] NONSPECIFIC PROCEDURE  08/27/2009    occluded left common iliac and external iliac arteries were successfully revascularized with stenting to 8 and 7 mm             Allergies:     Allergies   Allergen Reactions    Contrast Dye Anaphylaxis     Pt reported facial and throat swelling with prior CT contrast    Pantoprazole      Protonix caused diffuse edema    Chantix [Varenicline]      Terrible dreams    Gluten Meal GI Disturbance     Pt has celiac disease    Penicillins Itching and Rash             Outpatient Medications:     Current Facility-Administered Medications   Medication Dose Route Frequency Provider Last Rate Last Admin    acetaminophen (TYLENOL) tablet 1,000 mg  1,000 mg Oral Q6H PRN Marcin White MD   1,000 mg at 11/21/24 0957    albuterol (PROVENTIL HFA/VENTOLIN HFA) inhaler  2 puff Inhalation Q4H PRN Marcin White MD        amLODIPine (NORVASC) tablet 10 mg  10 mg Oral Daily Marcin White MD   10 mg at 11/21/24 0810    calcium carbonate (TUMS) chewable tablet 1,000 mg  1,000 mg Oral 4x Daily PRN Marcin White MD        carboxymethylcellulose PF (REFRESH PLUS) 0.5 % ophthalmic solution 1 drop  1 drop Both Eyes Q1H PRN Marcin White MD        clopidogrel (PLAVIX) tablet 75 mg  75 mg Oral Daily  Marcin White MD   75 mg at 11/21/24 0810    diphenhydrAMINE-zinc acetate (BENADRYL) 2-0.1 % cream   Topical TID PRN Carlos Hunter MD        fluticasone-vilanterol (BREO ELLIPTA) 200-25 MCG/ACT inhaler 1 puff  1 puff Inhalation Daily Marcin White MD        [Held by provider] furosemide (LASIX) injection 60 mg  60 mg Intravenous BID Marcin White MD   60 mg at 11/21/24 0811    gabapentin (NEURONTIN) capsule 200 mg  200 mg Oral At Bedtime Marcin White MD   200 mg at 11/20/24 2215    heparin 25,000 units in 0.45% NaCl 250 mL ANTICOAGULANT infusion  0-5,000 Units/hr Intravenous Continuous Yoli Huizar MD 5.5 mL/hr at 11/21/24 1140 550 Units/hr at 11/21/24 1140    hydrALAZINE (APRESOLINE) tablet 50 mg  50 mg Oral TID Marcin White MD   50 mg at 11/21/24 0810    isosorbide mononitrate (IMDUR) 24 hr tablet 60 mg  60 mg Oral Daily Marcin White MD   60 mg at 11/21/24 0812    lidocaine (LMX4) cream   Topical Q1H PRN Marcin White MD        lidocaine (LMX4) cream   Topical Q1H PRN Marcin White MD        lidocaine 1 % 0.1-1 mL  0.1-1 mL Other Q1H PRN Marcin White MD        lidocaine 1 % 0.1-1 mL  0.1-1 mL Other Q1H PRN Marcin White MD        metoprolol tartrate (LOPRESSOR) tablet 50 mg  50 mg Oral BID Yoli Huizar MD   50 mg at 11/21/24 1133    nicotine (NICODERM CQ) 21 MG/24HR 24 hr patch 1 patch  1 patch Transdermal Daily Carlos Hunter MD   1 patch at 11/21/24 0810    Followed by    [START ON 1/2/2025] nicotine (NICODERM CQ) 14 MG/24HR 24 hr patch 1 patch  1 patch Transdermal Daily Carlos Hunter MD        Followed by    [START ON 1/30/2025] nicotine (NICODERM CQ) 7 MG/24HR 24 hr patch 1 patch  1 patch Transdermal Daily Carlos Hunter MD        nitroGLYcerin (NITROSTAT) sublingual tablet 0.4 mg  0.4 mg Sublingual Q5 Min PRN Marcin White MD   0.4 mg at 11/20/24 2119    No lozenges or gum should be given while patient on BIPAP/AVAPS/AVAPS AE   Does not apply Continuous PRN Cindy  MD Marcin        Patient may continue current oral medications   Does not apply Continuous PRN Marcin White MD        prochlorperazine (COMPAZINE) injection 5 mg  5 mg Intravenous Q6H PRN Marcin White MD        Or    prochlorperazine (COMPAZINE) tablet 5 mg  5 mg Oral Q6H PRN Marcin White MD        rosuvastatin (CRESTOR) tablet 10 mg  10 mg Oral At Bedtime Marcin White MD   10 mg at 11/20/24 2215    senna-docusate (SENOKOT-S/PERICOLACE) 8.6-50 MG per tablet 1 tablet  1 tablet Oral BID PRN Marcin White MD        Or    senna-docusate (SENOKOT-S/PERICOLACE) 8.6-50 MG per tablet 2 tablet  2 tablet Oral BID PRN Marcin White MD        sodium chloride (PF) 0.9% PF flush 3 mL  3 mL Intracatheter Q8H Marcin White MD        sodium chloride (PF) 0.9% PF flush 3 mL  3 mL Intracatheter q1 min prn Marcin White MD        sodium chloride (PF) 0.9% PF flush 3 mL  3 mL Intracatheter Q8H Marcin White MD   3 mL at 11/21/24 0030    sodium chloride (PF) 0.9% PF flush 3 mL  3 mL Intracatheter q1 min prn Marcin White MD        sodium chloride (PF) 0.9% PF flush 3 mL  3 mL Intracatheter Q8H Marcin White MD        sodium chloride (PF) 0.9% PF flush 3 mL  3 mL Intracatheter q1 min prn Marcin White MD                 Family History:     Family History   Problem Relation Age of Onset    Cancer Mother         bladder    Cardiovascular Father         alive,multiple heart attacks    Diabetes Maternal Grandmother     Lung Cancer Maternal Grandmother     Blood Disease Brother         clotting disorder               Social History:     Social History     Tobacco Use    Smoking status: Former     Current packs/day: 0.25     Average packs/day: 0.3 packs/day for 56.9 years (14.2 ttl pk-yrs)     Types: Cigarettes     Start date: 1968    Smokeless tobacco: Never    Tobacco comments:     1/2 PPD. 1-3 cigs a day   Vaping Use    Vaping status: Never Used   Substance Use Topics    Alcohol use: Not Currently     Comment: x1-2 yr    Drug use: Yes     Types:  "Marijuana     Comment: 2x per day             Physical Exam:   BP (!) 150/117   Pulse (!) 122   Temp 98.1  F (36.7  C) (Oral)   Resp 22   Ht 1.575 m (5' 2\")   Wt 45.4 kg (100 lb)   LMP  (LMP Unknown)   SpO2 (!) 89%   BMI 18.29 kg/m      General: NAD  HEENT: Anicteric sclera, EOMI, MMM, OP unobstructed, w/o erythema/discharge; no cervical LAD  CV: RRR, no m/r/g  Lungs: Clear.   Abd: Soft, NT, ND  Ext: WWP,  No LE edema  Skin: No rashes, cyanosis, or jaundice  Neuro: AAOx3, no focal deficits          Data:   -B/l effusion      "

## 2024-11-22 ENCOUNTER — APPOINTMENT (OUTPATIENT)
Dept: ULTRASOUND IMAGING | Facility: CLINIC | Age: 64
DRG: 280 | End: 2024-11-22
Attending: INTERNAL MEDICINE
Payer: COMMERCIAL

## 2024-11-22 ENCOUNTER — APPOINTMENT (OUTPATIENT)
Dept: CARDIOLOGY | Facility: CLINIC | Age: 64
DRG: 280 | End: 2024-11-22
Attending: STUDENT IN AN ORGANIZED HEALTH CARE EDUCATION/TRAINING PROGRAM
Payer: COMMERCIAL

## 2024-11-22 LAB
ANION GAP SERPL CALCULATED.3IONS-SCNC: 12 MMOL/L (ref 7–15)
ATRIAL RATE - MUSE: 129 BPM
BUN SERPL-MCNC: 69.8 MG/DL (ref 8–23)
CALCIUM SERPL-MCNC: 8 MG/DL (ref 8.8–10.4)
CHLORIDE SERPL-SCNC: 97 MMOL/L (ref 98–107)
CREAT SERPL-MCNC: 2.02 MG/DL (ref 0.51–0.95)
DIASTOLIC BLOOD PRESSURE - MUSE: NORMAL MMHG
EGFRCR SERPLBLD CKD-EPI 2021: 27 ML/MIN/1.73M2
ERYTHROCYTE [DISTWIDTH] IN BLOOD BY AUTOMATED COUNT: 14.6 % (ref 10–15)
GLUCOSE SERPL-MCNC: 126 MG/DL (ref 70–99)
HCO3 SERPL-SCNC: 21 MMOL/L (ref 22–29)
HCT VFR BLD AUTO: 31.5 % (ref 35–47)
HGB BLD-MCNC: 10.4 G/DL (ref 11.7–15.7)
INTERPRETATION ECG - MUSE: NORMAL
LACTATE SERPL-SCNC: 0.8 MMOL/L (ref 0.7–2)
LVEF ECHO: NORMAL
MCH RBC QN AUTO: 27.5 PG (ref 26.5–33)
MCHC RBC AUTO-ENTMCNC: 33 G/DL (ref 31.5–36.5)
MCV RBC AUTO: 83 FL (ref 78–100)
P AXIS - MUSE: NORMAL DEGREES
PLATELET # BLD AUTO: 239 10E3/UL (ref 150–450)
POTASSIUM SERPL-SCNC: 4 MMOL/L (ref 3.4–5.3)
PR INTERVAL - MUSE: NORMAL MS
QRS DURATION - MUSE: 86 MS
QT - MUSE: 332 MS
QTC - MUSE: 453 MS
R AXIS - MUSE: -33 DEGREES
RBC # BLD AUTO: 3.78 10E6/UL (ref 3.8–5.2)
SODIUM SERPL-SCNC: 130 MMOL/L (ref 135–145)
SYSTOLIC BLOOD PRESSURE - MUSE: NORMAL MMHG
T AXIS - MUSE: 71 DEGREES
UFH PPP CHRO-ACNC: 0.26 IU/ML
UFH PPP CHRO-ACNC: 0.41 IU/ML
UFH PPP CHRO-ACNC: <0.1 IU/ML
VENTRICULAR RATE- MUSE: 112 BPM
WBC # BLD AUTO: 8.2 10E3/UL (ref 4–11)

## 2024-11-22 PROCEDURE — 93325 DOPPLER ECHO COLOR FLOW MAPG: CPT

## 2024-11-22 PROCEDURE — 99232 SBSQ HOSP IP/OBS MODERATE 35: CPT | Performed by: STUDENT IN AN ORGANIZED HEALTH CARE EDUCATION/TRAINING PROGRAM

## 2024-11-22 PROCEDURE — 250N000011 HC RX IP 250 OP 636: Performed by: INTERNAL MEDICINE

## 2024-11-22 PROCEDURE — 85520 HEPARIN ASSAY: CPT | Performed by: STUDENT IN AN ORGANIZED HEALTH CARE EDUCATION/TRAINING PROGRAM

## 2024-11-22 PROCEDURE — 99233 SBSQ HOSP IP/OBS HIGH 50: CPT | Mod: 25 | Performed by: NURSE PRACTITIONER

## 2024-11-22 PROCEDURE — 0W993ZZ DRAINAGE OF RIGHT PLEURAL CAVITY, PERCUTANEOUS APPROACH: ICD-10-PCS | Performed by: RADIOLOGY

## 2024-11-22 PROCEDURE — 999N000157 HC STATISTIC RCP TIME EA 10 MIN

## 2024-11-22 PROCEDURE — 93321 DOPPLER ECHO F-UP/LMTD STD: CPT | Mod: 26 | Performed by: INTERNAL MEDICINE

## 2024-11-22 PROCEDURE — 99233 SBSQ HOSP IP/OBS HIGH 50: CPT | Performed by: INTERNAL MEDICINE

## 2024-11-22 PROCEDURE — 85027 COMPLETE CBC AUTOMATED: CPT | Performed by: STUDENT IN AN ORGANIZED HEALTH CARE EDUCATION/TRAINING PROGRAM

## 2024-11-22 PROCEDURE — 250N000013 HC RX MED GY IP 250 OP 250 PS 637: Performed by: STUDENT IN AN ORGANIZED HEALTH CARE EDUCATION/TRAINING PROGRAM

## 2024-11-22 PROCEDURE — 80048 BASIC METABOLIC PNL TOTAL CA: CPT | Performed by: STUDENT IN AN ORGANIZED HEALTH CARE EDUCATION/TRAINING PROGRAM

## 2024-11-22 PROCEDURE — 93325 DOPPLER ECHO COLOR FLOW MAPG: CPT | Mod: 26 | Performed by: INTERNAL MEDICINE

## 2024-11-22 PROCEDURE — 250N000009 HC RX 250: Performed by: RADIOLOGY

## 2024-11-22 PROCEDURE — 250N000011 HC RX IP 250 OP 636: Performed by: STUDENT IN AN ORGANIZED HEALTH CARE EDUCATION/TRAINING PROGRAM

## 2024-11-22 PROCEDURE — 93308 TTE F-UP OR LMTD: CPT | Mod: 26 | Performed by: INTERNAL MEDICINE

## 2024-11-22 PROCEDURE — 272N000706 US THORACENTESIS

## 2024-11-22 PROCEDURE — 83605 ASSAY OF LACTIC ACID: CPT | Performed by: STUDENT IN AN ORGANIZED HEALTH CARE EDUCATION/TRAINING PROGRAM

## 2024-11-22 PROCEDURE — 36415 COLL VENOUS BLD VENIPUNCTURE: CPT | Performed by: STUDENT IN AN ORGANIZED HEALTH CARE EDUCATION/TRAINING PROGRAM

## 2024-11-22 PROCEDURE — 32555 ASPIRATE PLEURA W/ IMAGING: CPT

## 2024-11-22 PROCEDURE — 120N000013 HC R&B IMCU

## 2024-11-22 RX ORDER — LIDOCAINE HYDROCHLORIDE 10 MG/ML
10 INJECTION, SOLUTION EPIDURAL; INFILTRATION; INTRACAUDAL; PERINEURAL ONCE
Status: DISCONTINUED | OUTPATIENT
Start: 2024-11-22 | End: 2024-11-22

## 2024-11-22 RX ORDER — AZITHROMYCIN 250 MG/1
250 TABLET, FILM COATED ORAL DAILY
Status: COMPLETED | OUTPATIENT
Start: 2024-11-22 | End: 2024-11-25

## 2024-11-22 RX ADMIN — ACETAMINOPHEN 1000 MG: 500 TABLET, FILM COATED ORAL at 04:07

## 2024-11-22 RX ADMIN — METHYLPREDNISOLONE SODIUM SUCCINATE 40 MG: 40 INJECTION, POWDER, FOR SOLUTION INTRAMUSCULAR; INTRAVENOUS at 11:53

## 2024-11-22 RX ADMIN — ROSUVASTATIN CALCIUM 10 MG: 10 TABLET, FILM COATED ORAL at 20:14

## 2024-11-22 RX ADMIN — ACETAMINOPHEN 1000 MG: 500 TABLET, FILM COATED ORAL at 16:19

## 2024-11-22 RX ADMIN — GABAPENTIN 200 MG: 100 CAPSULE ORAL at 20:15

## 2024-11-22 RX ADMIN — HYDRALAZINE HYDROCHLORIDE 50 MG: 50 TABLET ORAL at 20:14

## 2024-11-22 RX ADMIN — METOPROLOL TARTRATE 50 MG: 50 TABLET, FILM COATED ORAL at 08:34

## 2024-11-22 RX ADMIN — HEPARIN SODIUM 850 UNITS/HR: 10000 INJECTION, SOLUTION INTRAVENOUS at 04:01

## 2024-11-22 RX ADMIN — LIDOCAINE HYDROCHLORIDE 10 ML: 10 INJECTION, SOLUTION EPIDURAL; INFILTRATION; INTRACAUDAL; PERINEURAL at 15:52

## 2024-11-22 RX ADMIN — HEPARIN SODIUM 850 UNITS/HR: 10000 INJECTION, SOLUTION INTRAVENOUS at 20:28

## 2024-11-22 RX ADMIN — AZITHROMYCIN DIHYDRATE 250 MG: 250 TABLET ORAL at 11:53

## 2024-11-22 RX ADMIN — HYDRALAZINE HYDROCHLORIDE 50 MG: 50 TABLET ORAL at 14:12

## 2024-11-22 RX ADMIN — HYDRALAZINE HYDROCHLORIDE 50 MG: 50 TABLET ORAL at 08:34

## 2024-11-22 RX ADMIN — ACETAMINOPHEN 1000 MG: 500 TABLET, FILM COATED ORAL at 22:36

## 2024-11-22 RX ADMIN — AMLODIPINE BESYLATE 10 MG: 10 TABLET ORAL at 08:34

## 2024-11-22 RX ADMIN — METOPROLOL TARTRATE 50 MG: 50 TABLET, FILM COATED ORAL at 20:14

## 2024-11-22 RX ADMIN — CLOPIDOGREL BISULFATE 75 MG: 75 TABLET ORAL at 08:34

## 2024-11-22 RX ADMIN — NICOTINE 1 PATCH: 21 PATCH, EXTENDED RELEASE TRANSDERMAL at 08:37

## 2024-11-22 RX ADMIN — ISOSORBIDE MONONITRATE 60 MG: 60 TABLET, EXTENDED RELEASE ORAL at 08:34

## 2024-11-22 RX ADMIN — CEFTRIAXONE SODIUM 2 G: 2 INJECTION, POWDER, FOR SOLUTION INTRAMUSCULAR; INTRAVENOUS at 11:56

## 2024-11-22 RX ADMIN — FLUTICASONE FUROATE AND VILANTEROL TRIFENATATE 1 PUFF: 200; 25 POWDER RESPIRATORY (INHALATION) at 08:38

## 2024-11-22 ASSESSMENT — ACTIVITIES OF DAILY LIVING (ADL)
ADLS_ACUITY_SCORE: 0
DEPENDENT_IADLS:: LAUNDRY;COOKING
ADLS_ACUITY_SCORE: 0

## 2024-11-22 NOTE — PLAN OF CARE
Goal Outcome Evaluation:      Plan of Care Reviewed With: patient          Outcome Evaluation: Pt's goal is d/c home

## 2024-11-22 NOTE — CONSULTS
Patient has Medicare Advantage through [employer] sponsored by an employer.    Xarelto/Eliquis:  $12/mo.    Jennifer Lorenzana  Pharmacy Technician/Liaison, Discharge Pharmacy   [phone] (voice or text)  [email or web address]  Pharmacy test claims are estimates and may not reflect final costs.   Suggested alternatives aim to be cost-effective but may not be therapeutically equivalent as this consult is informational and does not constitute medical advice.   Clinical decisions should be made by qualified healthcare providers.

## 2024-11-22 NOTE — PROGRESS NOTES
PULMONARY CONSULT  Date of service: 2024    Patient: Shirley Hendricks      : 1960      MRN: [id number]         Impressions/Recommendations:     #B/l pleural effusion:  -Right lower lobe infiltrates.  -It can be rexpansion pulmonry edema.  -  CT chest showed normal lung parenchyma but b/l effusion.  - Now status past left side thoracentesis and prior right sided thoracentesis.   -Reordered right sided THORACENTESIS she still has pleural effusion.     - Also ordered 40 MG  daily soluemedrol.  Ca do 5 days of prednisone.   - Agree with starting antibiotics for cap.     -Pulmonary will sign off.     Armen Magdaleno MD  Pulmonary & Critical Care            History of Present Illness:   Shirley Hendricks is a 64 year old female, on Plavix, with a history of COPD, hypertension, CHF, peripheral artery disease, NSTEMI, stage 4 CKD, and tobacco use who is being admitted for evaluation of shortness of breath.           Review of Symptoms:   10-point ROS reviewed, & found negative w/ exceptions noted in the HPI.          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 clot    Post-menopausal     Reflux esophagitis 2004     EGD: esophagitis and medium HH    SBO (small bowel obstruction) (H) 08/10/2021    SVT (supraventricular tachycardia) (H)     Thrombocytopenia (H)     Uncomplicated asthma     Vitamin C deficiency 08/12/2018       Past Surgical History:   Procedure Laterality Date    AMPUTATE LEG ABOVE KNEE N/A 4/1/2024    Procedure: AMPUTATION, ABOVE KNEE right leg with wound vac dressing, excision of anteriotibial bypass graft, closure of (previous interventional radiology) left groin incision;  Surgeon: José Luis Hernandez MD;  Location:  OR    AMPUTATE LEG ABOVE KNEE Right 4/9/2024    Procedure: COMPLETION RIGHT ABOVE KNEE AMPUTATION;  Surgeon: José Luis Hernandez MD;  Location:  OR    ANGIOGRAM      ANGIOGRAM Right 6/23/2021    Procedure: RIGHT LOWER EXTREMITY ANGIOGRAM WITH LEFT BRACHIAL ARTERY CUTDOWN;  Surgeon: José Luis Hernandez MD;  Location:  OR    APPLY WOUND VAC Right 5/16/2024    Procedure: PLACEMENT OF WOUND VAC TO RIGHT ABOVE KNEE AMPUTATION;  Surgeon: Tay Enciso MD;  Location:  OR    APPLY WOUND VAC N/A 5/20/2024    Procedure: AND PLACEMENT OF WOUND VAC;  Surgeon: José Luis Hernandez MD;  Location:  OR    BIOPSY MASS NECK Right 10/11/2023    Procedure: Right Parotid Biopsy;  Surgeon: Randal Mendoza MD;  Location:  OR    BRONCHOSCOPY FLEXIBLE AND RIGID N/A 6/26/2024    Procedure: Bronchoscopy flexible and rigid;  Surgeon: Rod Nath MD;  Location:  GI    BYPASS GRAFT FEMOROPOPLITEAL Right 09/23/2020    Procedure: RIGHT FEMORAL GRAFT TO ABOVE-KNEE POPLITEAL BYPASS USING CADAVERIC SUPERFICIAL FEMORAL ARTERY;  Surgeon: Chadwick Lozano MD;  Location:  OR    BYPASS GRAFT FEMOROPOPLITEAL Right 2/15/2022    Procedure: RIGHT SUPERFICIAL FEMORAL ARTERY GRAFT TO BELOW KNEE POPLITEAL BYPASS WITH CADAVERIC CRYOLIFE  FEMORAL-POPLITEAL ARTERY SIZE: OUTER DIAMETER: 7MM - 6MM;  Surgeon: Chadwick Lozano;  Location:  OR    BYPASS GRAFT FEMOROPOPLITEAL Right 5/26/2023     Procedure: RIGHT DISTAL SUPERFICIAL FEMORAL GRAFT TO ANTERIOR TIBIAL ARTERY WITH 26 CENTIMETER CADAVERIC SUPERFICIAL FEMORAL ARTERY GRAFT;  Surgeon: Chadwick Lozano MD;  Location:  OR    BYPASS GRAFT FEMOROPOPLITEAL Right 10/11/2023    Procedure: RIGHT FEMORAL TO POPLITEAL GRAFT THROMBECTOMY;  Surgeon: Chadwick Lozano MD;  Location:  OR    BYPASS GRAFT INSITU FEMOROPOPLITEAL Right 7/7/2021    Procedure: CREATION RIGHT FEMORAL TO POPLITEAL ARTERIAL BYPASS USING INSITU VEIN GRAFT;  Surgeon: José Luis Hernandez MD;  Location:  OR    CARDIAC CATHERIZATION  09/03/2009    multivessel CAD without target lesions, med mgmt indicated, preserved ef    CARPAL TUNNEL RELEASE RT/LT Right 05/20/2021    COLONOSCOPY N/A 12/12/2023    Procedure: Colonoscopy;  Surgeon: Corey Hoffman MD;  Location:  GI    COLONOSCOPY N/A 12/14/2023    Procedure: Colonoscopy;  Surgeon: Corey Hoffman MD;  Location:  GI    CV CORONARY ANGIOGRAM N/A 10/4/2023    Procedure: Coronary Angiogram;  Surgeon: Rogelio Ricks MD;  Location:  [facility] CATH [facility]    CV PCI N/A 10/4/2023    Procedure: Percutaneous Coronary Intervention;  Surgeon: Rogelio Ricks MD;  Location:  [facility] CATH [facility]    EMBOLECTOMY LOWER EXTREMITY Right 10/6/2023    Procedure: Right anterior tibial bypass with graft, Right tibial endarterectomy,thrombectomy, Right doraslis pedis thrombectomy, Anterior Tibial vein patch.;  Surgeon: Chadwick Lozano MD;  Location:  OR    ENDARTERECTOMY CAROTID Right 05/11/2016    Procedure: ENDARTERECTOMY CAROTID;  Surgeon: Chadwick Lozano MD;  Location:  OR    ENDARTERECTOMY CAROTID Left 06/08/2020    Procedure: LEFT CAROTID ENDARTERECTOMY with distal facal vein patch  and EEG;  Surgeon: Chadwick Lozano MD;  Location:  OR    ESOPHAGOSCOPY, GASTROSCOPY, DUODENOSCOPY (EGD), COMBINED N/A 12/12/2023    Procedure: Esophagoscopy, gastroscopy, duodenoscopy  (EGD), combined;  Surgeon: Corey Hoffman MD;  Location:  GI    FINE NEEDLE ASPIRATION WITHOUT IMAGING GUIDANCE Right 9/22/2023    Procedure: Fine needle aspiration without imaging guidance;  Surgeon: Kiersten Aguilera MD;  Location: [facility]    FLUORESCENCE INTRAOPERATIVE VASCULAR ANGIOGRAPHY Right 12/28/2022    Procedure: RIGHT LEG ANGIOGRAM with intervention;  Surgeon: Chadwick Lozano MD;  Location:  OR    GYN SURGERY  left tube    left salpingectomy    IR ANGIOGRAM THROUGH CATHETER (ARTERIAL)  10/6/2023    IR ANGIOGRAM THROUGH CATHETER (ARTERIAL)  10/6/2023    IR ANGIOGRAM THROUGH CATHETER (ARTERIAL)  3/31/2024    IR ANGIOGRAM THROUGH CATHETER (ARTERIAL)  3/30/2024    IR CHEST TUBE PLACEMENT NON-TUNNELLED LEFT  6/23/2024    IR CVC TUNNEL PLACEMENT > 5 YRS OF AGE  4/3/2024    IR CVC TUNNEL PLACEMENT > 5 YRS OF AGE  6/23/2024    IR CVC TUNNEL REMOVAL RIGHT  5/28/2024    IR CVC TUNNEL REMOVAL RIGHT  7/3/2024    IR CVC TUNNEL REVISION RIGHT  4/15/2024    IR LOWER EXTREMITY ANGIOGRAM RIGHT  05/25/2021    IR LOWER EXTREMITY ANGIOGRAM RIGHT  10/5/2023    IR LOWER EXTREMITY ANGIOGRAM RIGHT  3/29/2024    IR OR ANGIOGRAM  6/23/2021    IR OR ANGIOGRAM  12/28/2022    IRRIGATION AND DEBRIDEMENT LOWER EXTREMITY, COMBINED Right 5/16/2024    Procedure: IRRIGATION AND DEBRIDEMENT OF RIGHT ABOVE KNEE AMPUTATION;  Surgeon: Tay Enciso MD;  Location:  OR    IRRIGATION AND DEBRIDEMENT LOWER EXTREMITY, COMBINED Right 5/20/2024    Procedure: IRRIGATION AND DEBRIDMENT RIGHT LOWER EXTREMITY;  Surgeon: José Luis Hernandez MD;  Location:  OR    LAPAROSCOPIC CHOLECYSTECTOMY N/A 09/27/2017    Procedure: LAPAROSCOPIC CHOLECYSTECTOMY;  LAPAROSCOPIC CHOLECYSTECTOMY;  Surgeon: Jacoby Tapia MD;  Location:  SD    LAPAROSCOPY DIAGNOSTIC (GENERAL) N/A 8/11/2021    Procedure: Exploratory laparoscopy,  laparoscopic lysis of adhesions, laparotomy;  Surgeon: Corina Ferreira MD;  Location:  OR    OR  ANGIOGRAM, LOWER EXTREMITY Right 10/11/2023    Procedure: Or Angiogram, Lower Extremity;  Surgeon: Chadwick Lozano MD;  Location:  OR    ORTHOPEDIC SURGERY      left knee surgery    REPAIR ANEURYSM FEMORAL ARTERY Left 12/28/2022    Procedure: REPAIR LEFT FEMORAL PSEUDOANEURYSM;  Surgeon: Chadwick Lozano MD;  Location:  OR    VASCULAR SURGERY  aoto bi fem  left fem-AK pop    [facility] FABRIC WRAPPING OF ABDOMINAL ANEURYSM      [facility] NONSPECIFIC PROCEDURE  12/2000    angioplasty with stent right fem. a.    [facility] NONSPECIFIC PROCEDURE  1987    sinus surgery    [facility] NONSPECIFIC PROCEDURE  09/02/2009    Emergent left groin exploration with oversewing of bleeding angiographic site. 2. Endarterectomy of common femoral-proximal superficial femoral artery with greater saphenous vein patch angioplasty.   a. [place] of accessory right greater saphenous vein.     [facility] NONSPECIFIC PROCEDURE  08/27/2009    occluded left common iliac and external iliac arteries were successfully revascularized with stenting to 8 and 7 mm             Allergies:     Allergies   Allergen Reactions    Contrast Dye Anaphylaxis     Pt reported facial and throat swelling with prior CT contrast    Pantoprazole      Protonix caused diffuse edema    Chantix [Varenicline]      Terrible dreams    Gluten Meal GI Disturbance     Pt has celiac disease    Penicillins Itching and Rash             Outpatient Medications:     Current Facility-Administered Medications   Medication Dose Route Frequency Provider Last Rate Last Admin    acetaminophen (TYLENOL) tablet 1,000 mg  1,000 mg Oral Q6H PRN Marcin White MD   1,000 mg at 11/22/24 0407    albuterol (PROVENTIL HFA/VENTOLIN HFA) inhaler  2 puff Inhalation Q4H PRN Marcin White MD        amLODIPine (NORVASC) tablet 10 mg  10 mg Oral Daily Marcin White MD   10 mg at 11/22/24 0834    azithromycin (ZITHROMAX) tablet 250 mg  250 mg Oral Daily Yoli Huizar MD   250 mg at 11/22/24 1153    calcium  carbonate (TUMS) chewable tablet 1,000 mg  1,000 mg Oral 4x Daily PRN Marcin White MD        carboxymethylcellulose PF (REFRESH PLUS) 0.5 % ophthalmic solution 1 drop  1 drop Both Eyes Q1H PRN Marcin White MD        cefTRIAXone (ROCEPHIN) 2 g vial to attach to  ml bag for ADULTS or NS 50 ml bag for PEDS  2 g Intravenous Q24H Yoli Huizar MD   2 g at 11/22/24 1156    clopidogrel (PLAVIX) tablet 75 mg  75 mg Oral Daily Marcin White MD   75 mg at 11/22/24 0834    diphenhydrAMINE-zinc acetate (BENADRYL) 2-0.1 % cream   Topical TID PRN Carlos Hunter MD        fluticasone-vilanterol (BREO ELLIPTA) 200-25 MCG/ACT inhaler 1 puff  1 puff Inhalation Daily Marcni White MD   1 puff at 11/22/24 0838    [Held by provider] furosemide (LASIX) injection 60 mg  60 mg Intravenous BID Marcin White MD   60 mg at 11/21/24 0811    gabapentin (NEURONTIN) capsule 200 mg  200 mg Oral At Bedtime Marcin White MD   200 mg at 11/21/24 2007    heparin 25,000 units in 0.45% NaCl 250 mL ANTICOAGULANT infusion  0-5,000 Units/hr Intravenous Continuous Yoli Huizar MD 8.5 mL/hr at 11/22/24 0401 850 Units/hr at 11/22/24 0401    hydrALAZINE (APRESOLINE) tablet 50 mg  50 mg Oral TID Marcin White MD   50 mg at 11/22/24 1412    isosorbide mononitrate (IMDUR) 24 hr tablet 60 mg  60 mg Oral Daily Marcin White MD   60 mg at 11/22/24 0834    lidocaine (LMX4) cream   Topical Q1H PRN Marcin White MD        lidocaine 1 % 0.1-1 mL  0.1-1 mL Other Q1H PRN Marcin White MD        methylPREDNISolone sodium succinate (SOLU-MEDROL) injection 40 mg  40 mg Intravenous Q24H Armen Magdaleno MD   40 mg at 11/22/24 1153    metoprolol tartrate (LOPRESSOR) tablet 50 mg  50 mg Oral BID Yoli Huizar MD   50 mg at 11/22/24 0834    nicotine (NICODERM CQ) 21 MG/24HR 24 hr patch 1 patch  1 patch Transdermal Daily Carlos Hunter MD   1 patch at 11/22/24 0837    Followed by    [START ON 1/2/2025] nicotine (NICODERM CQ) 14  MG/24HR 24 hr patch 1 patch  1 patch Transdermal Daily Carlos Hunter MD        Followed by    [START ON 1/30/2025] nicotine (NICODERM CQ) 7 MG/24HR 24 hr patch 1 patch  1 patch Transdermal Daily Carlos Hunter MD        nitroGLYcerin (NITROSTAT) sublingual tablet 0.4 mg  0.4 mg Sublingual Q5 Min PRN Marcin White MD   0.4 mg at 11/20/24 2119    No lozenges or gum should be given while patient on BIPAP/AVAPS/AVAPS AE   Does not apply Continuous PRN Marcin White MD        Patient may continue current oral medications   Does not apply Continuous PRN Marcin White MD        prochlorperazine (COMPAZINE) injection 5 mg  5 mg Intravenous Q6H PRN Marcin White MD        Or    prochlorperazine (COMPAZINE) tablet 5 mg  5 mg Oral Q6H PRN Marcin White MD        rosuvastatin (CRESTOR) tablet 10 mg  10 mg Oral At Bedtime Marcin White MD   10 mg at 11/21/24 2007    senna-docusate (SENOKOT-S/PERICOLACE) 8.6-50 MG per tablet 1 tablet  1 tablet Oral BID PRN Marcin White MD        Or    senna-docusate (SENOKOT-S/PERICOLACE) 8.6-50 MG per tablet 2 tablet  2 tablet Oral BID PRN Marcin White MD        sodium chloride (PF) 0.9% PF flush 3 mL  3 mL Intracatheter Q8H Marcin White MD        sodium chloride (PF) 0.9% PF flush 3 mL  3 mL Intracatheter q1 min prn Marcin White MD                 Family History:     Family History   Problem Relation Age of Onset    Cancer Mother         bladder    Cardiovascular Father         alive,multiple heart attacks    Diabetes Maternal Grandmother     Lung Cancer Maternal Grandmother     Blood Disease Brother         clotting disorder               Social History:     Social History     Tobacco Use    Smoking status: Former     Current packs/day: 0.25     Average packs/day: 0.3 packs/day for 56.9 years (14.2 ttl pk-yrs)     Types: Cigarettes     Start date: 1968    Smokeless tobacco: Never    Tobacco comments:     1/2 PPD. 1-3 cigs a day   Vaping Use    Vaping status: Never Used   Substance Use  "Topics    Alcohol use: Not Currently     Comment: x1-2 yr    Drug use: Yes     Types: Marijuana     Comment: 2x per day             Physical Exam:   BP (!) 161/91   Pulse 57   Temp 98.7  F (37.1  C) (Oral)   Resp 17   Ht 1.575 m (5' 2\")   Wt 45.3 kg (99 lb 12.8 oz)   LMP  (LMP Unknown)   SpO2 94%   BMI 18.25 kg/m      General: NAD  HEENT: Anicteric sclera, EOMI, MMM, OP unobstructed, w/o erythema/discharge; no cervical LAD  CV: RRR, no m/r/g  Lungs: Clear.   Abd: Soft, NT, ND  Ext: WWP,  No LE edema  Skin: No rashes, cyanosis, or jaundice  Neuro: AAOx3, no focal deficits          Data:   -B/l effusion      "

## 2024-11-22 NOTE — CONSULTS
Care Management Initial Consult    General Information  Assessment completed with: Patient,    Type of CM/SW Visit: Initial Assessment    Primary Care Provider verified and updated as needed: Yes   Readmission within the last 30 days:        Reason for Consult: discharge planning  Advance Care Planning: Advance Care Planning Reviewed: present on chart          Communication Assessment  Patient's communication style: spoken language (English or Bilingual)    Hearing Difficulty or Deaf: no   Wear Glasses or Blind: no    Cognitive  Cognitive/Neuro/Behavioral: WDL  Level of Consciousness: alert  Arousal Level: opens eyes spontaneously  Orientation: oriented x 4  Mood/Behavior: cooperative  Best Language: 0 - No aphasia  Speech: clear    Living Environment:   People in home: child(ifeanyi), adult, sibling(s)     Current living Arrangements: house      Able to return to prior arrangements: yes       Family/Social Support:  Care provided by: self, other (see comments) (brother)  Provides care for: no one  Marital Status:   Support system: Children, Sibling(s)          Description of Support System: Involved    Support Assessment: Adequate family and caregiver support    Current Resources:   Patient receiving home care services: No        Community Resources: None  Equipment currently used at home: wheelchair, manual, prosthesis, transfer board  Supplies currently used at home:      Employment/Financial:  Employment Status: retired        Financial Concerns: none   Referral to Financial Worker: No       Does the patient's insurance plan have a 3 day qualifying hospital stay waiver?  Yes     Which insurance plan 3 day waiver is available? Alternative insurance waiver    Will the waiver be used for post-acute placement? No    Lifestyle & Psychosocial Needs:  Social Drivers of Health     Food Insecurity: Low Risk  (11/21/2024)    Food Insecurity     Within the past 12 months, did you worry that your food would run out  before you got money to buy more?: No     Within the past 12 months, did the food you bought just not last and you didn t have money to get more?: No   Depression: Not at risk (6/21/2024)    PHQ-2     PHQ-2 Score: 2   Housing Stability: Low Risk  (11/21/2024)    Housing Stability     Do you have housing? : Yes     Are you worried about losing your housing?: No   Tobacco Use: Medium Risk (11/14/2024)    Received from [facility]    Patient History     Smoking Tobacco Use: Former     Smokeless Tobacco Use: Never     Passive Exposure: Past   Financial Resource Strain: Low Risk  (11/21/2024)    Financial Resource Strain     Within the past 12 months, have you or your family members you live with been unable to get utilities (heat, electricity) when it was really needed?: No   Alcohol Use: Not on file   Transportation Needs: Low Risk  (11/21/2024)    Transportation Needs     Within the past 12 months, has lack of transportation kept you from medical appointments, getting your medicines, non-medical meetings or appointments, work, or from getting things that you need?: No   Physical Activity: Not on file   Interpersonal Safety: Low Risk  (11/21/2024)    Interpersonal Safety     Do you feel physically and emotionally safe where you currently live?: Yes     Within the past 12 months, have you been hit, slapped, kicked or otherwise physically hurt by someone?: No     Within the past 12 months, have you been humiliated or emotionally abused in other ways by your partner or ex-partner?: No   Stress: Not on file   Social Connections: Not on file   Health Literacy: Not on file       Functional Status:  Prior to admission patient needed assistance:   Dependent ADLs:: Wheelchair-independent  Dependent IADLs:: Laundry, Cooking       Mental Health Status:  Mental Health Status: No Current Concerns       Chemical Dependency Status:  Chemical Dependency Status: No Current Concerns             Values/Beliefs:  Spiritual,  Cultural Beliefs, Jew Practices, Values that affect care: no               Discussed  Partnership in Safe Discharge Planning  document with patient/family: No    Additional Information:  SW/CM consult for discharge planning, per MD order. SW reviewed chart and met with pt. She is currently in high flow O2, but able to hold a conversation. Pt lives in her own home with her brother, son and his family. Pt uses a wheelchair for mobility. She is able to do her own dressing and manages her own medications. Her brother transfers her in to the shower on a sliding board. He does the laundry and cooking. Pt uses [employer] for transportation and has a ramp in to the house. No current home care services. Confirmed PCP is Dr Benoit and home address. Pt's goal is discharge home. She does not anticipate the need for any home care or other services at this time.     Next Steps: JUANITA/JANAY will continue to follow for discharge planning.     VIRGIL Monsalve, [facility] HospitalSW  [phone] Desk phone  [phone] Cell/text (Preferred)  [facility]

## 2024-11-22 NOTE — PROGRESS NOTES
[facility]    Medicine Progress Note - Hospitalist Service    Date of Admission:  11/20/2024    Assessment & Plan   Shirley Hendricks is a 64 year old female, on Plavix, with a history of COPD, hypertension, CHF, peripheral artery disease, NSTEMI, stage 4 CKD, and tobacco use who is being admitted for evaluation of shortness of breath.      Acute hypoxic respiratory failure   Acute on chronic diastolic heart failure in exacerbation:  Moderate to severe left pleural effusion status post thoracentesis  Type II NSTEMI likely due to demand due to heart failure exacerbation  # Community Acquired Pnuemonia  Assessment: Presents with 2-day history of increasing shortness of breath and a cough.  On admission chest x-ray shows a large left pleural effusion with compressive atelectasis.  There is also diffuse interstitial opacities consistent with pulmonary edema.  No pneumothorax was noted.  Labs are pertinent for a proBNP of 26,004-20, mildly elevated point of 98, creatinine 1.71. PTA on Norvasc 10 mg daily, atenolol 25 mg daily, torsemide 50 mg daily, Jardiance 10 mg daily  Plan:  - - Ultrasound guided thoracentesis on 11/20/24 s/p .1 liters of straw-colored fluid were removed and sent to lab, if requested.   - Monitor on telemetry  - Follow daily weights/I's and O's  - Cardiac diet  -- Continue BiPAP, wean as able  --Pulmonolgy consulted   - Continuous oximetry  - Cardiology Consulted  - Continue PTA jardiance 10 mg daily   - Continue PTA Atenolol  - Continue PTA Imdur 60 mg daily   -Keep oxygen above 90%  -CT showed bilateral pleural effusions and repeat Thoracentesis ordered  -Hold diuresis today due to bump in creatinine  -CT scan also shows right-sided superimposed pneumonia and patient has been started on ceftriaxone and azithromycin  Keep oxygen saturation above 90%  -Limited echo ordered  -Duplex on 11/21/24 no DVT  -Continue solumedrol  -Patient refused right sided thoracentesis and  left sided 1200ml drained yellow colored fluid was drained ( Lights criteria -Transudate  -Cytology and cultures pending   -Right-sided thoracentesis ordered  -Pharmacy liaison consult for DOAC            # New onset atrial fibrillation with RVR  -Converted -Sinus bradycardia  -Patient started on heparin drip and transition to DOAC  -Atenolol discontinued on 11/21/24 and metoprolol 50 twice daily started        Hypertension  Hyperlipidemia  Coronary artery disease with history of MI in 2019  Peripheral vascular disease with history of 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 2024  Assessment: PTA on amlodipine 10 mg, Coreg changed to atenolol 25 mg daily, hydralazine 50 mg 3 times daily, Imdur 60 mg daily, Crestor 10 mg  Plan:  -     # Hypoanatremia   Mild   -         Acute kidney injury  on CKD stage III  Assessment: Creatinine on admission of 1.71, which is within her baseline.1.2-1.4  Plan:  -Daily BMP while on IV diuresis  -Avoid NSAIDs/nephrotoxins  -Hold Lasix today due to bump in creatinine to 2.02     History of COPD not in exacerbation  - Continue with Breo and as needed albuterol nebs available     GERD  - Continue with PTA famotidine     Anemia of Chronic Disease  Baseline hemoglobin has been between 8-9 over the past few months  Plan:  Hb 10.4 No active bleeding            History of thrombocytopenia  - Platelet count normal on admission     Tobacco use  - Continue with nicotine patch  -Counselled on cessation             Diet: Low Saturated Fat Na <2400 mg    DVT Prophylaxis: Heparin GTT  Alvarez Catheter: Not present  Lines: None     Cardiac Monitoring: ACTIVE order. Indication: Acute decompensated heart failure (48 hours)  Code Status: Full Code      Clinically Significant Risk Factors         # Hyponatremia: Lowest Na = 131 mmol/L in last 2 days, will monitor as appropriate  # Hypochloremia: Lowest Cl = 94 mmol/L in last 2 days, will monitor as appropriate      # Hypoalbuminemia: Lowest albumin = 3 g/dL at 11/21/2024  4:42  AM, will monitor as appropriate     # Hypertension: Noted on problem list     # Acute Hypoxic Respiratory Failure: Documented O2 saturation < 90%. Continue supplemental oxygen as needed             # Financial/Environmental Concerns:           Social Drivers of Health    Tobacco Use: Medium Risk (11/14/2024)    Received from [email or web address]    Patient History     Smoking Tobacco Use: Former     Smokeless Tobacco Use: Never     Passive Exposure: Past          Disposition Plan     Medically Ready for Discharge: Anticipated in 2-4 Days             Yoli Huizar MD  Hospitalist Service  [facility]  Securely message with [employer] (more info)  Text page via [employer] Paging/Directory   ______________________________________________________________________    Interval History   Patient seen and examined at bedside.  Patient feels much better since yesterday.  Shortness of breath has improved.  Continues to have cough.  Cough is improved.  No hemoptysis.  Physical Exam   Vital Signs: Temp: 98.5  F (36.9  C) Temp src: Axillary BP: (!) 163/80 Pulse: 58   Resp: 13 SpO2: 97 % O2 Device: High Flow Nasal Cannula (HFNC) Oxygen Delivery: 35 LPM  Weight: 99 lbs 12.8 oz  Physical Exam  Cardiovascular:      Rate and Rhythm: Normal rate and regular rhythm.   Pulmonary:      Effort: No respiratory distress.      Breath sounds: Normal breath sounds.   Abdominal:      General: There is no distension.      Palpations: Abdomen is soft.      Tenderness: There is no abdominal tenderness.        .scotty  Medical Decision Making       40 MINUTES SPENT BY ME on the date of service doing chart review, history, exam, documentation & further activities per the note.      Data     I have personally reviewed the following data over the past 24 hrs:    8.2  \   10.4 (L)   / 239     130 (L) 97 (L) 69.8 (H) /  126 (H)   4.0 21 (L) 2.02 (H) \     ALT: N/A AST: N/A AP: N/A TBILI: N/A   ALB: N/A TOT PROTEIN: 6.7  LIPASE: N/A     Procal: N/A CRP: 61.97 (H) Lactic Acid: N/A       Ferritin:  N/A % Retic:  N/A LDH:  161       Imaging results reviewed over the past 24 hrs:   Recent Results (from the past 24 hours)   CT Chest w/o Contrast    Narrative    CT CHEST WITHOUT CONTRAST  11/21/2024 9:49 AM    CLINICAL HISTORY: Asses for pneumonia, edema, pleural effusion.    TECHNIQUE: CT chest without IV contrast. Multiplanar reformats were  obtained. Dose reduction techniques were used.  CONTRAST: None.    COMPARISON: Chest radiograph 11/21/2024.    FINDINGS:   LUNGS AND PLEURA: Decreased small to moderate right pleural effusion  with adjacent atelectasis. New patchy consolidative opacity within the  right middle lobe and lower lobe with air bronchograms. Similar  moderate left pleural effusion with complete atelectasis of the left  lower lobe. No pneumothorax. Mild interstitial pulmonary edema.    MEDIASTINUM/AXILLAE: Prominent mediastinal lymph nodes may be  reactive. No thoracic aortic aneurysm. Hypodense thyroid nodules  measuring up to 1.1 cm. Trace pericardial fluid.    CORONARY ARTERY CALCIFICATION: Severe.    UPPER ABDOMEN: Trace perisplenic ascites. Cholecystectomy.    MUSCULOSKELETAL: No aggressive osseous lesion. Remote left rib  fracture.      Impression    IMPRESSION:   1.  Small to moderate right pleural effusion with adjacent  atelectasis. New patchy consolidative opacity within the right middle  lobe and lower lobe concerning for superimposed pneumonia with  possible component of reexpansion edema.  2.  Moderate left pleural effusion with complete atelectasis of the  left lower lobe.  3.  Mild interstitial pulmonary edema.    SILVIO COOK MD         SYSTEM ID:  [id number]   US Thoracentesis    Narrative    ULTRASOUND GUIDED THORACENTESIS  11/21/2024 4:04 PM     HISTORY: B/l pleural effusion- Please do b/l thoracnetesis    FINDINGS: Ultrasound was used to evaluate for the presence and best  approach for drainage  of a pleural effusion. Written and oral informed  consent was obtained. A pause for the cause procedure to verify the  correct patient and correct procedure. The skin overlying the left  chest posteriorly was prepped and draped in the usual sterile fashion.  The subcutaneous tissues were anesthetized with 10 mL of 1 percent  lidocaine injected. A catheter was advanced into the pleural space and  1200 mL of  yellow colored fluid was drained. Ultrasound images were  permanently stored.  There were no immediate complications. Patient  left the ultrasound suite in satisfactory condition.    Right thoracentesis performed yesterday evening. Imaging of the right  pleural space demonstrated persistent small-to-moderate right pleural  effusion. Patient did not want to proceed with right-sided  thoracentesis at this time.      Impression    IMPRESSION: Technically successful left thoracentesis without  immediate complications.    SILVIO COOK MD         SYSTEM ID:  [id number]   US Lower Extremity Venous Duplex Bilateral    Narrative    VENOUS ULTRASOUND BILATERAL LEG(S)  11/21/2024 4:09 PM     HISTORY: DVT. History of right above-knee amputation.    COMPARISON: 7/30/2024    FINDINGS: Examination of the deep veins with graded compression and  color flow Doppler with spectral wave form analysis was performed.     Left: Images show no evidence of thrombus in the bilateral common  femoral vein, femoral vein, popliteal vein or calf veins. Where  imaged, the great saphenous vein is patent.    Right: Right common femoral, profunda femoral, proximal femoral, mid  femoral veins are patent without evidence of deep vein thrombosis.  Where imaged, the great saphenous vein is patent.      Impression    IMPRESSION: No deep vein thrombosis in either lower extremity.    GHASSAN COLLAZO DO         SYSTEM ID:  [id number]

## 2024-11-22 NOTE — PROGRESS NOTES
[id number]  Aox4, cooperative. VSS on HFNC 50L, 70-80%. Tele SB-SR. Frequent productive cough. Restarted heparin 1 hour after thoracentesis done. Hep XA check at 2330. Scattered scabs and bruising. Purewick in place and working well. Not OOB this shift. Cardiac diet. BLE US completed.

## 2024-11-22 NOTE — PLAN OF CARE
A/Ox4. VSS ex bradycardic at times (as low as 51 bpm). Tele SR/SB. On HFNC 35lpm 40%. LS diminished in bases. +bs, +flatus, -bm. Purewick in place. Voiding adequately. Tolerating diet. Managing pain with prn tylenol. Repositioning herself in bed. Has not been OOB. Previous R AKA. Does not have prosthetic here.

## 2024-11-22 NOTE — PLAN OF CARE
Orientations: A&O  Vitals/Pain: VSS ex on HFNC 54% 45L, HTN at times. Complained of back pain managed with PRN tylenol   Tele: NSR w/ 1st degree AVB  Lines/Drains: Heparin gtt infusing at 850 recheck tomorrow AM  Skin/Wounds: R & L Back thoracentesis sites CDI. Previous R AKA  GI/: -BM, +flatus, +void- purewick in place  Ambulation/Assist: not OOB this shift.   Sleep Quality: good  Labs: lactic 0.8  Plan: Thoracentesis completed at bedside today. Continue to monitor respiratory status & wean O2.

## 2024-11-22 NOTE — PROGRESS NOTES
[facility]    Cardiology Progress Note    Primary Cardiologist: Dr. Huang    Date of Admission: 11/20/2024  Service Date: 11/22/24    Summary:  Ms. Shirley Hendricks is a very pleasant 64 year old female with a past medical history of coronary artery disease, peripheral artery disease, bilateral carotid endarterectomies, history of stress cardiomyopathy with recovered EF, HFpEF, hypertension, hyperlipidemia, COPD with ongoing tobacco use, malignant B-cell lymphoma, stage IIIb CKD historically requiring temporary dialysis (6/2024) who was admitted on 11/20/2024 for progressive shortness of breath. Cardiology was consulted for heart failure.    Interval History   Shortness of breath improved today following thoracentesis yesterday. Appears euvolemic. Weight at baseline of 99# today.     Telemetry: Sinus rhythm, rates 60's    Assessment & Plan   1.  Acute hypoxic respiratory failure, etiology multifactorial likely secondary to acute decompensation of HFpEF, large pleural effusions, COPD  -Diuresed with IV furosemide resulting in significant increase in creatinine to~2 and returned to baseline weight  -Underwent right-sided thoracentesis on 11/20/2024 with removal of 1.1 L of straw-colored fluid  -Underwent ultrasound guided left sided thoracentesis yesterday with removal of 1200 mL of yellow-colored fluid following CT scan completed 11/20/2024 showing small to moderate right pleural effusion with moderate left pleural effusion as well as mild pulmonary edema, at that time patient declined right sided thoracentesis    2.  Newly diagnosed atrial fibrillation with RVR, [id number] (age, gender, vascular disease, hypertension, heart failure)  -Started on Toprol as alternative to atenolol for better rate control  -Currently on heparin drip for thromboembolic prophylaxis  -Eliquis/Xarelto both $12/mo     3.  History of coronary artery disease  -10/2023 coronary angiogram:   Prox RCA lesion  is 45% stenosed-not hemodynamically significant by IFR     RPAV-1 lesion is 30% stenosed  RPAV-2 lesion is 50% stenosed  1st Mrg lesion is 45% stenosed  1st Diag lesion is 100% stenosed-felt to be likely chronically occluded, filled by collaterals from distal LAD  Dist Cx lesion is 55% stenosed  -PTA maintained on Imdur, Plavix, and rosuvastatin    4.  HFpEF with acute decompensation likely secondary to #2  -7/2024 TTE with normal left ventricular structure, function, and size, ejection fraction estimated at 60% no significant valvular abnormalities  -PTA on atenolol 25 mg daily, Jardiance 10 mg daily, hydralazine 50 mg 3 times daily, Imdur 60 mg daily, torsemide 30 mg daily  -Estimated dry weight ~98#    5.  Peripheral artery disease  S/p right BKA 4/1/2024  S/p aortobifemoral bypass and left fem-pop bypass  Right femoral graft limb to above the knee popliteal bypass graft 9/2020  S/p right femoral to anterior tibial PTFE bypass graft with single vessel runoff on 10/11 followed by embolectomy of her graft on 10/13     6.  Acute on chronic renal failure  -Baseline creatinine 1.2-1.4  -Today, creatinine 2.02, following diuresis with IV furosemide yesterday    7.  Elevated troponin  -Troponin trend flat 98-87, no delta rise  -No anginal symptoms    Plan:   1.  Agree with repeat transthoracic echocardiogram for reassessment of cardiac structure  2.  Follow cytology of pleural fluid specimen  3.  Plan to switch to NOAC once no further procedures planned, continue heparin drip at this point, will place pharmacy liaison consult for DOAC pricing  4.  Would hold off on further diuresis given continued LIMA and symptomatic improvement, planning for right sided thoracentesis today, will consider further diuresis following reassessment of symptoms tomorrow /renal function tomorrow  5.  Continue metoprolol 50mg BID  6.  Cardiology will continue to follow     Thank you for the opportunity to participate in this pleasant patient's  care.     NOELLE Lowe, CNP   Nurse Practitioner  [facility]  (8am - 5pm, M-F)    Patient Active Problem List   Diagnosis    Reflux esophagitis    Osteoporosis    Cervicalgia    Hyperlipidemia LDL goal <70    PAD (peripheral artery disease) (H)    Essential hypertension    Coronary artery disease involving native coronary artery of native heart without angina pectoris    Primary osteoarthritis involving multiple joints    DDD (degenerative disc disease), lumbar    S/P carotid endarterectomy    Chronic allergic rhinitis due to animal hair and dander    Tobacco use disorder    Chronic obstructive pulmonary disease, unspecified COPD type (H)    Anxiety    Vitamin C deficiency    History of colonic polyps    Bilateral carpal tunnel syndrome    Charcot-Breonna-Tooth disease type 1A    Atherosclerosis of bypass graft of right lower extremity with other clinical manifestation (H)    Pseudoaneurysm of femoral artery following procedure (H)    NSTEMI (non-ST elevated myocardial infarction) (H)    Acute reaction to situational stress    Acute on chronic anemia    Lymphoma of lymph nodes of neck, unspecified lymphoma type (H)    Arterial occlusion    S/P AKA (above knee amputation) unilateral, right (H)    Wound infection    Amputation stump infection (H)    Hyperkalemia    Hypoxia    Pleural effusion on left    Pleural effusion    CKD (chronic kidney disease) stage 4, GFR 15-29 ml/min (H)    Shortness of breath    Chronic kidney disease, unspecified CKD stage    Acute respiratory failure with hypoxia (H)    Acute on chronic heart failure, unspecified heart failure type (H)    Hypertensive renal disease    Marginal zone lymphoma (H)    Multiple pulmonary nodules    Pain of upper abdomen    Rash    Polyp of colon    Stage 3b chronic kidney disease (H)    Celiac disease    Benign neoplasm of ascending colon    Acquired absence of right leg above knee (H)       Physical Exam   Temp: 98.7  F (37.1  C)  Temp src: Oral BP: (!) 163/80 Pulse: 58   Resp: 13 SpO2: 97 % O2 Device: High Flow Nasal Cannula (HFNC) Oxygen Delivery: 35 LPM  Vitals:    11/20/24 1453 11/22/24 0415   Weight: 45.4 kg (100 lb) 45.3 kg (99 lb 12.8 oz)     Vital Signs with Ranges  Temp:  [98.1  F (36.7  C)-98.7  F (37.1  C)] 98.7  F (37.1  C)  Pulse:  [] 58  Resp:  [11-28] 13  BP: (125-175)/() 163/80  FiO2 (%):  [40 %-80 %] 45 %  SpO2:  [89 %-100 %] 97 %  I/O last 3 completed shifts:  In: 240 [P.O.:240]  Out: 350 [Urine:350]    Constitutional:  Appears her stated age, well nourished, and in no acute distress. Thin.  Eyes: Pupils equal, round. Sclerae anicteric.   HEENT: Normocephalic, atraumatic.   Neck: Supple. No JVD appreciated.  Respiratory: Breathing non-labored. Lungs clear to auscultation bilaterally, diminished in bilateral bases. On high flow oxygen   Cardiovascular: Regular rate and rhythm, normal S1 and S2. No murmur, rub, or gallop.  GI: Soft, non-distended  Skin: Warm, dry.   Musculoskeletal/Extremities: Moves all extremities well and symmetrically. No edema.  Neurologic: No gross focal deficits. Alert, awake, and oriented to person, place and time.  Psychiatric: Affect appropriate. Mentation normal.    Medications   Current Facility-Administered Medications   Medication Dose Route Frequency Provider Last Rate Last Admin    heparin 25,000 units in 0.45% NaCl 250 mL ANTICOAGULANT infusion  0-5,000 Units/hr Intravenous Continuous Yoli Huizar MD 8.5 mL/hr at 11/22/24 0401 850 Units/hr at 11/22/24 0401    No lozenges or gum should be given while patient on BIPAP/AVAPS/AVAPS AE   Does not apply Continuous PRN Marcin White MD        Patient may continue current oral medications   Does not apply Continuous PRN Marcin White MD         Current Facility-Administered Medications   Medication Dose Route Frequency Provider Last Rate Last Admin    amLODIPine (NORVASC) tablet 10 mg  10 mg Oral Daily Marcin White MD   10 mg  at 11/22/24 0834    cefTRIAXone (ROCEPHIN) 2 g vial to attach to  ml bag for ADULTS or NS 50 ml bag for PEDS  2 g Intravenous Q24H Yoli Huizar MD   2 g at 11/21/24 1352    clopidogrel (PLAVIX) tablet 75 mg  75 mg Oral Daily Marcin White MD   75 mg at 11/22/24 0834    fluticasone-vilanterol (BREO ELLIPTA) 200-25 MCG/ACT inhaler 1 puff  1 puff Inhalation Daily Marcin White MD   1 puff at 11/22/24 0838    [Held by provider] furosemide (LASIX) injection 60 mg  60 mg Intravenous BID Marcin White MD   60 mg at 11/21/24 0811    gabapentin (NEURONTIN) capsule 200 mg  200 mg Oral At Bedtime Marcin White MD   200 mg at 11/21/24 2007    hydrALAZINE (APRESOLINE) tablet 50 mg  50 mg Oral TID Marcin White MD   50 mg at 11/22/24 0834    isosorbide mononitrate (IMDUR) 24 hr tablet 60 mg  60 mg Oral Daily Marcin White MD   60 mg at 11/22/24 0834    methylPREDNISolone sodium succinate (SOLU-MEDROL) injection 40 mg  40 mg Intravenous Q24H Armen Magdaleno MD   40 mg at 11/21/24 1314    metoprolol tartrate (LOPRESSOR) tablet 50 mg  50 mg Oral BID Yoli Huizar MD   50 mg at 11/22/24 0834    nicotine (NICODERM CQ) 21 MG/24HR 24 hr patch 1 patch  1 patch Transdermal Daily Carlos Hunter MD   1 patch at 11/22/24 0837    Followed by    [START ON 1/2/2025] nicotine (NICODERM CQ) 14 MG/24HR 24 hr patch 1 patch  1 patch Transdermal Daily Carlos Hunter MD        Followed by    [START ON 1/30/2025] nicotine (NICODERM CQ) 7 MG/24HR 24 hr patch 1 patch  1 patch Transdermal Daily Carlos Hunter MD        rosuvastatin (CRESTOR) tablet 10 mg  10 mg Oral At Bedtime Marcin White MD   10 mg at 11/21/24 2007    sodium chloride (PF) 0.9% PF flush 3 mL  3 mL Intracatheter Q8H Marcin White MD        sodium chloride (PF) 0.9% PF flush 3 mL  3 mL Intracatheter Q8H Marcin White MD   3 mL at 11/22/24 0403    sodium chloride (PF) 0.9% PF flush 3 mL  3 mL Intracatheter Q8H Marcin White MD   3 mL at 11/21/24 2022        Data   Recent Results (from the past 24 hours)   CT Chest w/o Contrast    Narrative    CT CHEST WITHOUT CONTRAST  11/21/2024 9:49 AM    CLINICAL HISTORY: Asses for pneumonia, edema, pleural effusion.    TECHNIQUE: CT chest without IV contrast. Multiplanar reformats were  obtained. Dose reduction techniques were used.  CONTRAST: None.    COMPARISON: Chest radiograph 11/21/2024.    FINDINGS:   LUNGS AND PLEURA: Decreased small to moderate right pleural effusion  with adjacent atelectasis. New patchy consolidative opacity within the  right middle lobe and lower lobe with air bronchograms. Similar  moderate left pleural effusion with complete atelectasis of the left  lower lobe. No pneumothorax. Mild interstitial pulmonary edema.    MEDIASTINUM/AXILLAE: Prominent mediastinal lymph nodes may be  reactive. No thoracic aortic aneurysm. Hypodense thyroid nodules  measuring up to 1.1 cm. Trace pericardial fluid.    CORONARY ARTERY CALCIFICATION: Severe.    UPPER ABDOMEN: Trace perisplenic ascites. Cholecystectomy.    MUSCULOSKELETAL: No aggressive osseous lesion. Remote left rib  fracture.      Impression    IMPRESSION:   1.  Small to moderate right pleural effusion with adjacent  atelectasis. New patchy consolidative opacity within the right middle  lobe and lower lobe concerning for superimposed pneumonia with  possible component of reexpansion edema.  2.  Moderate left pleural effusion with complete atelectasis of the  left lower lobe.  3.  Mild interstitial pulmonary edema.    SILVIO COOK MD         SYSTEM ID:  [id number]   US Thoracentesis    Narrative    ULTRASOUND GUIDED THORACENTESIS  11/21/2024 4:04 PM     HISTORY: B/l pleural effusion- Please do b/l thoracnetesis    FINDINGS: Ultrasound was used to evaluate for the presence and best  approach for drainage of a pleural effusion. Written and oral informed  consent was obtained. A pause for the cause procedure to verify the  correct patient and correct  procedure. The skin overlying the left  chest posteriorly was prepped and draped in the usual sterile fashion.  The subcutaneous tissues were anesthetized with 10 mL of 1 percent  lidocaine injected. A catheter was advanced into the pleural space and  1200 mL of  yellow colored fluid was drained. Ultrasound images were  permanently stored.  There were no immediate complications. Patient  left the ultrasound suite in satisfactory condition.    Right thoracentesis performed yesterday evening. Imaging of the right  pleural space demonstrated persistent small-to-moderate right pleural  effusion. Patient did not want to proceed with right-sided  thoracentesis at this time.      Impression    IMPRESSION: Technically successful left thoracentesis without  immediate complications.    SILVIO COOK MD         SYSTEM ID:  [id number]   US Lower Extremity Venous Duplex Bilateral    Narrative    VENOUS ULTRASOUND BILATERAL LEG(S)  11/21/2024 4:09 PM     HISTORY: DVT. History of right above-knee amputation.    COMPARISON: 7/30/2024    FINDINGS: Examination of the deep veins with graded compression and  color flow Doppler with spectral wave form analysis was performed.     Left: Images show no evidence of thrombus in the bilateral common  femoral vein, femoral vein, popliteal vein or calf veins. Where  imaged, the great saphenous vein is patent.    Right: Right common femoral, profunda femoral, proximal femoral, mid  femoral veins are patent without evidence of deep vein thrombosis.  Where imaged, the great saphenous vein is patent.      Impression    IMPRESSION: No deep vein thrombosis in either lower extremity.    GHASSAN COLLAZO DO         SYSTEM ID:  [id number]       Recent Labs   Lab 11/22/24  0651 11/21/24  1054 11/21/24  0442   WBC 8.2 6.7 8.9   HGB 10.4* 13.1 10.8*   HCT 31.5* 39.6 33.0*   MCV 83 82 85    224 241     Recent Labs   Lab 11/22/24  0651 11/21/24  0442 11/21/24  0003 11/20/24  1555   * 131*   --  133*   POTASSIUM 4.0 4.1  --  4.3   CHLORIDE 97* 99  --  94*   CO2 21* 20*  --  21*   ANIONGAP 12 12  --  18*   * 81 95 91   BUN 69.8* 56.5*  --  54.5*   CR 2.02* 2.02*  --  1.71*   GFRESTIMATED 27* 27*  --  32*   SONIA 8.0* 8.2*  --  9.1     Recent Labs   Lab 11/20/24  4232   NTBNPI 26,420*        This note was completed in part using Dragon voice recognition software. Although reviewed after completion, some word and grammatical errors may occur.

## 2024-11-23 ENCOUNTER — APPOINTMENT (OUTPATIENT)
Dept: GENERAL RADIOLOGY | Facility: CLINIC | Age: 64
DRG: 280 | End: 2024-11-23
Attending: STUDENT IN AN ORGANIZED HEALTH CARE EDUCATION/TRAINING PROGRAM
Payer: COMMERCIAL

## 2024-11-23 LAB
ANION GAP SERPL CALCULATED.3IONS-SCNC: 16 MMOL/L (ref 7–15)
BUN SERPL-MCNC: 74.6 MG/DL (ref 8–23)
CALCIUM SERPL-MCNC: 8.4 MG/DL (ref 8.8–10.4)
CHLORIDE SERPL-SCNC: 94 MMOL/L (ref 98–107)
CREAT SERPL-MCNC: 1.89 MG/DL (ref 0.51–0.95)
EGFRCR SERPLBLD CKD-EPI 2021: 29 ML/MIN/1.73M2
ERYTHROCYTE [DISTWIDTH] IN BLOOD BY AUTOMATED COUNT: 14.6 % (ref 10–15)
GLUCOSE SERPL-MCNC: 112 MG/DL (ref 70–99)
HCO3 SERPL-SCNC: 20 MMOL/L (ref 22–29)
HCT VFR BLD AUTO: 36.3 % (ref 35–47)
HGB BLD-MCNC: 11.5 G/DL (ref 11.7–15.7)
LACTATE SERPL-SCNC: 1.2 MMOL/L (ref 0.7–2)
MCH RBC QN AUTO: 26.7 PG (ref 26.5–33)
MCHC RBC AUTO-ENTMCNC: 31.7 G/DL (ref 31.5–36.5)
MCV RBC AUTO: 84 FL (ref 78–100)
PLATELET # BLD AUTO: 313 10E3/UL (ref 150–450)
POTASSIUM SERPL-SCNC: 4.3 MMOL/L (ref 3.4–5.3)
RBC # BLD AUTO: 4.31 10E6/UL (ref 3.8–5.2)
SODIUM SERPL-SCNC: 130 MMOL/L (ref 135–145)
UFH PPP CHRO-ACNC: 0.46 IU/ML
WBC # BLD AUTO: 9.6 10E3/UL (ref 4–11)

## 2024-11-23 PROCEDURE — 85014 HEMATOCRIT: CPT | Performed by: STUDENT IN AN ORGANIZED HEALTH CARE EDUCATION/TRAINING PROGRAM

## 2024-11-23 PROCEDURE — 250N000013 HC RX MED GY IP 250 OP 250 PS 637: Performed by: STUDENT IN AN ORGANIZED HEALTH CARE EDUCATION/TRAINING PROGRAM

## 2024-11-23 PROCEDURE — 999N000157 HC STATISTIC RCP TIME EA 10 MIN

## 2024-11-23 PROCEDURE — 120N000013 HC R&B IMCU

## 2024-11-23 PROCEDURE — 80048 BASIC METABOLIC PNL TOTAL CA: CPT | Performed by: STUDENT IN AN ORGANIZED HEALTH CARE EDUCATION/TRAINING PROGRAM

## 2024-11-23 PROCEDURE — 250N000011 HC RX IP 250 OP 636: Performed by: STUDENT IN AN ORGANIZED HEALTH CARE EDUCATION/TRAINING PROGRAM

## 2024-11-23 PROCEDURE — 85520 HEPARIN ASSAY: CPT | Performed by: STUDENT IN AN ORGANIZED HEALTH CARE EDUCATION/TRAINING PROGRAM

## 2024-11-23 PROCEDURE — 94640 AIRWAY INHALATION TREATMENT: CPT

## 2024-11-23 PROCEDURE — 250N000011 HC RX IP 250 OP 636: Performed by: INTERNAL MEDICINE

## 2024-11-23 PROCEDURE — 99233 SBSQ HOSP IP/OBS HIGH 50: CPT | Performed by: STUDENT IN AN ORGANIZED HEALTH CARE EDUCATION/TRAINING PROGRAM

## 2024-11-23 PROCEDURE — 999N000215 HC STATISTIC HFNC ADULT NON-CPAP

## 2024-11-23 PROCEDURE — 99232 SBSQ HOSP IP/OBS MODERATE 35: CPT | Performed by: INTERNAL MEDICINE

## 2024-11-23 PROCEDURE — 71045 X-RAY EXAM CHEST 1 VIEW: CPT

## 2024-11-23 PROCEDURE — 36415 COLL VENOUS BLD VENIPUNCTURE: CPT | Performed by: STUDENT IN AN ORGANIZED HEALTH CARE EDUCATION/TRAINING PROGRAM

## 2024-11-23 PROCEDURE — 83605 ASSAY OF LACTIC ACID: CPT | Performed by: STUDENT IN AN ORGANIZED HEALTH CARE EDUCATION/TRAINING PROGRAM

## 2024-11-23 PROCEDURE — 250N000009 HC RX 250: Performed by: STUDENT IN AN ORGANIZED HEALTH CARE EDUCATION/TRAINING PROGRAM

## 2024-11-23 RX ORDER — HYDRALAZINE HYDROCHLORIDE 20 MG/ML
10 INJECTION INTRAMUSCULAR; INTRAVENOUS EVERY 4 HOURS PRN
Status: DISCONTINUED | OUTPATIENT
Start: 2024-11-23 | End: 2024-12-13 | Stop reason: HOSPADM

## 2024-11-23 RX ORDER — BUMETANIDE 0.25 MG/ML
2 INJECTION, SOLUTION INTRAMUSCULAR; INTRAVENOUS EVERY 12 HOURS
Status: COMPLETED | OUTPATIENT
Start: 2024-11-23 | End: 2024-11-24

## 2024-11-23 RX ORDER — ALBUTEROL SULFATE 0.83 MG/ML
SOLUTION RESPIRATORY (INHALATION)
Status: COMPLETED
Start: 2024-11-23 | End: 2024-11-23

## 2024-11-23 RX ORDER — LEVALBUTEROL INHALATION SOLUTION 1.25 MG/3ML
1.25 SOLUTION RESPIRATORY (INHALATION) EVERY 6 HOURS PRN
Status: DISCONTINUED | OUTPATIENT
Start: 2024-11-23 | End: 2024-12-13 | Stop reason: HOSPADM

## 2024-11-23 RX ADMIN — CLOPIDOGREL BISULFATE 75 MG: 75 TABLET ORAL at 08:25

## 2024-11-23 RX ADMIN — HYDRALAZINE HYDROCHLORIDE 50 MG: 50 TABLET ORAL at 20:40

## 2024-11-23 RX ADMIN — BUMETANIDE 2 MG: 0.25 INJECTION INTRAMUSCULAR; INTRAVENOUS at 15:08

## 2024-11-23 RX ADMIN — HYDRALAZINE HYDROCHLORIDE 50 MG: 50 TABLET ORAL at 08:26

## 2024-11-23 RX ADMIN — NICOTINE 1 PATCH: 21 PATCH, EXTENDED RELEASE TRANSDERMAL at 08:26

## 2024-11-23 RX ADMIN — APIXABAN 2.5 MG: 2.5 TABLET, FILM COATED ORAL at 20:40

## 2024-11-23 RX ADMIN — FLUTICASONE FUROATE AND VILANTEROL TRIFENATATE 1 PUFF: 200; 25 POWDER RESPIRATORY (INHALATION) at 12:46

## 2024-11-23 RX ADMIN — ROSUVASTATIN CALCIUM 10 MG: 10 TABLET, FILM COATED ORAL at 20:40

## 2024-11-23 RX ADMIN — HYDRALAZINE HYDROCHLORIDE 10 MG: 20 INJECTION INTRAMUSCULAR; INTRAVENOUS at 06:30

## 2024-11-23 RX ADMIN — APIXABAN 2.5 MG: 2.5 TABLET, FILM COATED ORAL at 12:45

## 2024-11-23 RX ADMIN — AZITHROMYCIN DIHYDRATE 250 MG: 250 TABLET ORAL at 08:25

## 2024-11-23 RX ADMIN — METHYLPREDNISOLONE SODIUM SUCCINATE 40 MG: 40 INJECTION, POWDER, FOR SOLUTION INTRAMUSCULAR; INTRAVENOUS at 12:45

## 2024-11-23 RX ADMIN — CEFTRIAXONE SODIUM 2 G: 2 INJECTION, POWDER, FOR SOLUTION INTRAMUSCULAR; INTRAVENOUS at 12:45

## 2024-11-23 RX ADMIN — GABAPENTIN 200 MG: 100 CAPSULE ORAL at 20:40

## 2024-11-23 RX ADMIN — ALBUTEROL SULFATE 2.5 MG: 2.5 SOLUTION RESPIRATORY (INHALATION) at 12:24

## 2024-11-23 RX ADMIN — AMLODIPINE BESYLATE 10 MG: 10 TABLET ORAL at 08:25

## 2024-11-23 RX ADMIN — ISOSORBIDE MONONITRATE 60 MG: 60 TABLET, EXTENDED RELEASE ORAL at 08:25

## 2024-11-23 RX ADMIN — ACETAMINOPHEN 1000 MG: 500 TABLET, FILM COATED ORAL at 20:54

## 2024-11-23 RX ADMIN — METOPROLOL TARTRATE 50 MG: 50 TABLET, FILM COATED ORAL at 08:26

## 2024-11-23 RX ADMIN — HYDRALAZINE HYDROCHLORIDE 50 MG: 50 TABLET ORAL at 15:09

## 2024-11-23 NOTE — PLAN OF CARE
"Patient Name: Larry  MRN: [id number]  Date of Admission: 11/20/2024  Reason for Admission: CHF exacerbation, Acute respiratory failure   Level of Care: [facility]    Vitals:   BP Readings from Last 1 Encounters:   11/23/24 (!) 172/81     Pulse Readings from Last 1 Encounters:   11/23/24 59     Wt Readings from Last 1 Encounters:   11/22/24 45.3 kg (99 lb 12.8 oz)     Ht Readings from Last 1 Encounters:   11/20/24 1.575 m (5' 2\")     Estimated body mass index is 18.25 kg/m  as calculated from the following:    Height as of this encounter: 1.575 m (5' 2\").    Weight as of this encounter: 45.3 kg (99 lb 12.8 oz).  Temp Readings from Last 1 Encounters:   11/23/24 97.6  F (36.4  C) (Oral)       Pain: Pain goal 0/10 Pain Rating 6/10 Effective pain medication/regimen PRN tylenol.     CV Surgery Patient: No    Assessment    Resp: LS course of HFNC 55% at 45 LPM  Telemetry: SR w/ peaked T waves  Neuro: A&O x4  GI/: No BM overnight, - flatus, +BS. Low UOP with Purewick, purewick removed d/t patient unable to sleep with it placed.   Skin/Wounds: Right AKA, R and L Thora sites.   Lines/Drains: 2 PIVs. Infusing Heparin at 850 units/hr  Activity: Ax1 pivot to commode,   Sleep: Fair  Abnormal Labs: Pending AM labs     PRN hydralazine given for SBP >180 x1    Aggression Stop Light: Green          Patient Care Plan: Wean O2. Continue plan of care   "

## 2024-11-23 NOTE — PROGRESS NOTES
[facility] Cardiology Progress Note    Date of Admission:  11/20/2024  Reason for Consult:   ADHF in the setting of presumed HFpEF    Subjective   Ms. Hendricks is a very pleasant 63 yo lady who presents with acute hypoxemic respiratory failure with bilateral pleural effusions.    - she continues to endorse shortness of breath; she can't complete her sentences.    Objective   INTAKE / OUTPUT     Intake/Output Summary (Last 24 hours) at 11/23/2024 0042  Last data filed at 11/22/2024 0600  Gross per 24 hour   Intake 120 ml   Output 200 ml   Net -80 ml       VITAL SIGNS  Temp:  [98  F (36.7  C)-98.7  F (37.1  C)] 98  F (36.7  C)  Pulse:  [57-65] 59  Resp:  [6-23] 13  BP: (147-177)/() 172/81  FiO2 (%):  [40 %-55 %] 55 %  SpO2:  [89 %-97 %] 96 %  99 lbs 12.8 oz    PHYSICAL EXAM   Constitutional: Appears stated age, well nourished, in mild-moderate distress. On HFNC.  Neck: Supple.  JVD elevated.  Respiratory: Non-labored. Diminished sounds throughout.  Cardiovascular: RRR. Bilateral lower extremities with 1+ edema.  GI: Soft, non-distended, non-tender.  Skin: Warm  Neurologic: No gross focal deficits. Alert, awake.  Psychiatric: Affect appropriate. Mentation normal.      Data   Most Recent 3 CBC's:  Recent Labs   Lab Test 11/22/24  0651 11/21/24  1054 11/21/24  0442   WBC 8.2 6.7 8.9   HGB 10.4* 13.1 10.8*   MCV 83 82 85    224 241     Most Recent 3  BMP's:  Recent Labs   Lab Test 11/22/24  0651 11/21/24  0442 11/21/24  0003 11/20/24  1555   * 131*  --  133*   POTASSIUM 4.0 4.1  --  4.3   CHLORIDE 97* 99  --  94*   CO2 21* 20*  --  21*   BUN 69.8* 56.5*  --  54.5*   CR 2.02* 2.02*  --  1.71*   ANIONGAP 12 12  --  18*   SONIA 8.0* 8.2*  --  9.1   * 81 95 91     Most Recent 3 BNP's:  Recent Labs   Lab Test 11/20/24  1555 07/30/24  1233 07/21/24  0050   NTBNPI 26,420* 32,028* 7,421*      Most Recent Cholesterol Panel:  Recent Labs   Lab Test  10/03/23  0941   CHOL 137   LDL 69   HDL 54   TRIG 68       Most Recent Transthoracic Echocardiogram:  Echo result w/o MOPS: Interpretation Summary The left ventricle is normal in size.Left ventricular systolic function is normal.The visual ejection fraction is 55-60%.Normal left ventricular wall motionThe right ventricle is normal in structure, function and size.There is mild (1+) mitral regurgitation.There is mild (1+) tricuspid regurgitation.Right ventricular systolic pressure is elevated, consistent with mildpulmonary hypertension.Compared to prior study, there is no significant change.    Most Recent Cardiac Catheterization:  Cardiac Catheterization    Result Date: 10/4/2023    Prox RCA lesion is 45% stenosed.    RPAV-1 lesion is 30% stenosed.    RPAV-2 lesion is 50% stenosed.    1st Mrg lesion is 45% stenosed.    1st Diag lesion is 100% stenosed.    Dist Cx lesion is 55% stenosed.    Right dominant coronary anatomy.  Completely occluded (probably chronic) D1 with left to left collaterals   from distal LAD to D1.  Moderate coronary artery disease involving proximal to mid RCA which is   not hemodynamically significant-IFR negative.  Moderate coronary disease involving distal small-caliber RPL and distal   circumflex artery.      Assessment & Plan     Assessment     Acute hypoxemic respiratory failure  Likely multifactorial (presumed HFpEF exacerbation, COPD, large pleural effusions)  Presumed HFpEF exacerbation  CAD  D1  with collaterals from distal LAD, moderate disease elsewhere  Significant bilateral pleural effusions  S/p thoracentesis (right on 11/20, left on 11/21), 1-1.1 L of straw-colored fluid  Newly diagnosed atrial fibrillation with RVR  CHADS-VASc 5  Hx of stress-induced CM with recovered LVEF  PAD  S/p BKA 4/1/2024  S/p aortobifemoral bypass and left fem-pop bypass  Right femoral graft limb to above the knee popliteal bypass graft 9/2020  S/p right femoral to anterior tibial PTFE bypass graft  with single vessel runoff on 10/11 followed by embolectomy of her graft on 10/13  HTN  HLD  COPD  Active tobacco use disorder  Malignant B-cell lymphoma  Stage IIIb CKD  Previously required temporary dialysis  Elevated troponin likely due to chronic myocardial injury    Overall, her kidney function worsened with diuresis, and then slightly improved without diuresis but she has continued to need supplemental oxygen. On physical exam, she doesn't appear significantly fluid overloaded. On echocardiography, her E/A is consistent with restrictive physiology. There is valve thickening and the walls do appear slightly hyperechoic but not thickened. There is a d-shaped septum, which is new compared to echo earlier this year, which raises the possibility of acutely worsening pulmonary pressures, including PE or simply worsening left-sided heart failure. The acuity of her symptoms necessitate ruling out PE, as she is being treated with therapeutic anticoagulation. Another less likely possibility is amyloidosis, especially in the setting of B cell lymphoma.    Plan     Cr: 1.5==> diuresis ==> 2==> held diuresis ==> 1.8==> diuresis today with bumex IV 2 mg BID  Concur with obtaining V/Q scan  If her kidney function and hypoxemia continue to remain without improvement, I would suggest RHC  Continue metoprolol (switched from PTA atenolol)  Continue heparin gtt and switch to DOAC indefinitely once all procedures are done  Continue PTA plavix, imdur, and rosuvastatin  If DOAC is initiated, plavix can be discontinued should there be a risk of bleeding  For HFpEF  Estimated dry weight 98 lbs  Continue jardiance 10 mg   Continue hydralazine 50 mg TID  Continue Imdur 60 mg daily; in the outpatient setting, consider switching Imdur to another antihypertensive agent given NEAT-HFpEF study findings  Follow up on cytology from pleural fluid  It wouldn't be unreasonable to rule out amyloidosis with SMOGA and MPSU.     Thank you for  involving us in the care of this patient.  We will continue to follow.    Fish Chacko MD on 11/23/2024 at 1:30 pm.

## 2024-11-23 NOTE — PROVIDER NOTIFICATION
MD Notification    Notified Person: MD    Notified Person Name: Carlos    Notification Date/Time: 11/23/2024 @ 0610    Notification Interaction: Voccharlene    Purpose of Notification: 89 KM is here for CHF exacerbation and acute respiratory failure. Shes currently tolerating High flow but we have not been enable to wean her O2 overnight. Her 0600 blood pressure was 181/89. Her SBP has been 150s-170s overnight. She does not have any PRNs for high blood pressure.     Orders Received: PRN hydralazine

## 2024-11-23 NOTE — PROGRESS NOTES
[facility]    Medicine Progress Note - Hospitalist Service    Date of Admission:  11/20/2024    Assessment & Plan   Shirley Hendricks is a 64 year old female, on Plavix, with a history of COPD, hypertension, CHF, peripheral artery disease, NSTEMI, stage 4 CKD, and tobacco use who is being admitted for evaluation of shortness of breath.      Acute hypoxic respiratory failure   Acute on chronic diastolic heart failure in exacerbation:  Moderate to severe left pleural effusion status post thoracentesis  Type II NSTEMI likely due to demand due to heart failure exacerbation  # Community Acquired Pnuemonia  Assessment: Presents with 2-day history of increasing shortness of breath and a cough.  On admission chest x-ray shows a large left pleural effusion with compressive atelectasis.  There is also diffuse interstitial opacities consistent with pulmonary edema.  No pneumothorax was noted.  Labs are pertinent for a proBNP of 26,004-20, mildly elevated point of 98, creatinine 1.71. PTA on Norvasc 10 mg daily, atenolol 25 mg daily, torsemide 50 mg daily, Jardiance 10 mg daily  Plan:  - - Ultrasound guided thoracentesis on 11/20/24 s/p .1 liters of straw-colored fluid were removed and sent to lab, if requested.   - Monitor on telemetry  - Follow daily weights/I's and O's  - Cardiac diet  -- Continue BiPAP, wean as able  --Pulmonolgy consulted   - Continuous oximetry  - Cardiology Consulted  - Continue PTA jardiance 10 mg daily   - Continue PTA Atenolol  - Continue PTA Imdur 60 mg daily   -Keep oxygen above 90%  -CT showed bilateral pleural effusions and repeat Thoracentesis ordered  -Hold diuresis today due to bump in creatinine  -CT scan also shows right-sided superimposed pneumonia and patient has been started on ceftriaxone and azithromycin  Keep oxygen saturation above 90%  -Limited echo ordered  -Duplex on 11/21/24 no DVT  -Continue solumedrol  -Patient refused right sided thoracentesis and  left sided 1200ml drained yellow colored fluid was drained ( Lights criteria -Transudate  -Cytology and cultures pending   -Right-sided thoracentesis ordered and 1.5 l fluid removed on 11/22/24  -Pharmacy liaison consult for DOAC  -discontinue Heparin and start patient on Apixaban  -Patient had increased work of breathing and x-ray shows pleural effusions and pulmonary edema and patient started on Bumex 1 mg 2 mg twice daily after discussing with Dr Whitten from cardiology and possible right heart cath on Monday   -Strict intake and output  -Monitor electrolytes closely            # New onset atrial fibrillation with RVR  -Converted -Sinus bradycardia  -Patient started on heparin drip and transition to DOAC  -Atenolol discontinued on 11/21/24 and metoprolol 50 twice daily started        Hypertension  Hyperlipidemia  Coronary artery disease with history of MI in 2019  Peripheral vascular disease with history of 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 2024  Assessment: PTA on amlodipine 10 mg, Coreg changed to atenolol 25 mg daily, hydralazine 50 mg 3 times daily, Imdur 60 mg daily, Crestor 10 mg  Plan:  - Continue metoprolol  -Continue hydralazine 50mg tid  -Continue amlodipine 10 mg    # Hypoanatremia   Mild   -Continue diuresis         Acute kidney injury  on CKD stage III  Assessment: Creatinine on admission of 1.71, which is within her baseline.1.2-1.4  Plan:  -Daily BMP while on IV diuresis  -Avoid NSAIDs/nephrotoxins  Creatinine improved to 1.89 and patient started on Bumex        History of COPD not in exacerbation  - Continue with Breo and as needed albuterol nebs available     GERD  - Continue with PTA famotidine     Anemia of Chronic Disease  Baseline hemoglobin has been between 8-9 over the past few months  Plan:  Hb 11.5 No active bleeding            History of thrombocytopenia  - Platelet count normal on admission     Tobacco use  - Continue with nicotine patch  -Counselled on cessation             Diet: Low Saturated Fat Na  "<2400 mg    DVT Prophylaxis: DOAC  Alvarez Catheter: Not present  Lines: None     Cardiac Monitoring: ACTIVE order. Indication: Acute decompensated heart failure (48 hours)  Code Status: Full Code      Clinically Significant Risk Factors         # Hyponatremia: Lowest Na = 130 mmol/L in last 2 days, will monitor as appropriate  # Hypochloremia: Lowest Cl = 94 mmol/L in last 2 days, will monitor as appropriate      # Hypoalbuminemia: Lowest albumin = 3 g/dL at 11/21/2024  4:42 AM, will monitor as appropriate     # Hypertension: Noted on problem list            # Cachexia: Estimated body mass index is 17.94 kg/m  as calculated from the following:    Height as of this encounter: 1.575 m (5' 2\").    Weight as of this encounter: 44.5 kg (98 lb 1.7 oz)., PRESENT ON ADMISSION     # Financial/Environmental Concerns: none         Social Drivers of Health    Tobacco Use: Medium Risk (11/14/2024)    Received from [employer]    Patient History     Smoking Tobacco Use: Former     Smokeless Tobacco Use: Never     Passive Exposure: Past          Disposition Plan     Medically Ready for Discharge: Anticipated in 2-4 Days             Yoli Huizar MD  Hospitalist Service  [employer]  Securely message with [employer] (more info)  Text page via [employer] Paging/Directory   ______________________________________________________________________    Interval History   No acute overnight events patient seen and examined at bedside  Patient complaining of shortness of breath or difficulty breathing.    Denies any chest pain   Or shortness of breath.  Cough has  improved.      Physical Exam   Vital Signs: Temp: 97.7  F (36.5  C) Temp src: Oral BP: (!) 173/81 Pulse: 56   Resp: 16 SpO2: 90 % O2 Device: High Flow Nasal Cannula (HFNC) Oxygen Delivery: 50 LPM  Weight: 98 lbs 1.68 oz    Physical Exam  Cardiovascular:      Rate and Rhythm: Normal rate and regular rhythm.   Pulmonary:      Effort: " Respiratory distress present.      Breath sounds: No wheezing or rales.      Comments: Mild respiratory distress  Abdominal:      General: There is no distension.      Palpations: Abdomen is soft.      Tenderness: There is no abdominal tenderness.   Musculoskeletal:      Right lower leg: No edema.      Left lower leg: No edema.   Neurological:      Mental Status: She is alert.          Medical Decision Making       52 MINUTES SPENT BY ME on the date of service doing chart review, history, exam, documentation & further activities per the note.      Data     I have personally reviewed the following data over the past 24 hrs:    9.6  \   11.5 (L)   / 313     130 (L) 94 (L) 74.6 (H) /  112 (H)   4.3 20 (L) 1.89 (H) \     Procal: N/A CRP: N/A Lactic Acid: 1.2         Imaging results reviewed over the past 24 hrs:   Recent Results (from the past 24 hours)   XR Chest Port 1 View    Narrative    EXAM: XR CHEST PORTABLE 1 VIEW  LOCATION: [facility]  DATE: 11/23/2024    INDICATION: Hypoxia.  COMPARISON: None.      Impression    IMPRESSION: Heart size is enlarged and there is moderate bilateral pulmonary edema with small bilateral pleural effusions. Findings are compatible with congestive heart failure. Overlying infectious process involving the lung bases is not excluded.

## 2024-11-24 LAB
ANION GAP SERPL CALCULATED.3IONS-SCNC: 13 MMOL/L (ref 7–15)
BUN SERPL-MCNC: 83.2 MG/DL (ref 8–23)
CALCIUM SERPL-MCNC: 8.3 MG/DL (ref 8.8–10.4)
CHLORIDE SERPL-SCNC: 97 MMOL/L (ref 98–107)
CREAT SERPL-MCNC: 1.87 MG/DL (ref 0.51–0.95)
EGFRCR SERPLBLD CKD-EPI 2021: 29 ML/MIN/1.73M2
ERYTHROCYTE [DISTWIDTH] IN BLOOD BY AUTOMATED COUNT: 14.6 % (ref 10–15)
GLUCOSE SERPL-MCNC: 128 MG/DL (ref 70–99)
HCO3 SERPL-SCNC: 21 MMOL/L (ref 22–29)
HCT VFR BLD AUTO: 35.1 % (ref 35–47)
HGB BLD-MCNC: 11.2 G/DL (ref 11.7–15.7)
MAGNESIUM SERPL-MCNC: 2.4 MG/DL (ref 1.7–2.3)
MCH RBC QN AUTO: 26.8 PG (ref 26.5–33)
MCHC RBC AUTO-ENTMCNC: 31.9 G/DL (ref 31.5–36.5)
MCV RBC AUTO: 84 FL (ref 78–100)
PLATELET # BLD AUTO: 308 10E3/UL (ref 150–450)
POTASSIUM SERPL-SCNC: 4.6 MMOL/L (ref 3.4–5.3)
RBC # BLD AUTO: 4.18 10E6/UL (ref 3.8–5.2)
SODIUM SERPL-SCNC: 131 MMOL/L (ref 135–145)
WBC # BLD AUTO: 9 10E3/UL (ref 4–11)

## 2024-11-24 PROCEDURE — 250N000009 HC RX 250: Performed by: STUDENT IN AN ORGANIZED HEALTH CARE EDUCATION/TRAINING PROGRAM

## 2024-11-24 PROCEDURE — 250N000013 HC RX MED GY IP 250 OP 250 PS 637: Performed by: INTERNAL MEDICINE

## 2024-11-24 PROCEDURE — 83735 ASSAY OF MAGNESIUM: CPT | Performed by: STUDENT IN AN ORGANIZED HEALTH CARE EDUCATION/TRAINING PROGRAM

## 2024-11-24 PROCEDURE — 250N000011 HC RX IP 250 OP 636: Performed by: INTERNAL MEDICINE

## 2024-11-24 PROCEDURE — 250N000011 HC RX IP 250 OP 636: Performed by: STUDENT IN AN ORGANIZED HEALTH CARE EDUCATION/TRAINING PROGRAM

## 2024-11-24 PROCEDURE — 80048 BASIC METABOLIC PNL TOTAL CA: CPT | Performed by: STUDENT IN AN ORGANIZED HEALTH CARE EDUCATION/TRAINING PROGRAM

## 2024-11-24 PROCEDURE — 94640 AIRWAY INHALATION TREATMENT: CPT | Mod: 76

## 2024-11-24 PROCEDURE — 85027 COMPLETE CBC AUTOMATED: CPT | Performed by: STUDENT IN AN ORGANIZED HEALTH CARE EDUCATION/TRAINING PROGRAM

## 2024-11-24 PROCEDURE — 250N000012 HC RX MED GY IP 250 OP 636 PS 637: Performed by: INTERNAL MEDICINE

## 2024-11-24 PROCEDURE — 99232 SBSQ HOSP IP/OBS MODERATE 35: CPT | Performed by: INTERNAL MEDICINE

## 2024-11-24 PROCEDURE — 250N000013 HC RX MED GY IP 250 OP 250 PS 637: Performed by: STUDENT IN AN ORGANIZED HEALTH CARE EDUCATION/TRAINING PROGRAM

## 2024-11-24 PROCEDURE — 36415 COLL VENOUS BLD VENIPUNCTURE: CPT | Performed by: STUDENT IN AN ORGANIZED HEALTH CARE EDUCATION/TRAINING PROGRAM

## 2024-11-24 PROCEDURE — 99233 SBSQ HOSP IP/OBS HIGH 50: CPT | Performed by: INTERNAL MEDICINE

## 2024-11-24 PROCEDURE — 82374 ASSAY BLOOD CARBON DIOXIDE: CPT | Performed by: STUDENT IN AN ORGANIZED HEALTH CARE EDUCATION/TRAINING PROGRAM

## 2024-11-24 PROCEDURE — 120N000001 HC R&B MED SURG/OB

## 2024-11-24 PROCEDURE — 999N000157 HC STATISTIC RCP TIME EA 10 MIN

## 2024-11-24 PROCEDURE — 250N000009 HC RX 250: Performed by: INTERNAL MEDICINE

## 2024-11-24 PROCEDURE — 94640 AIRWAY INHALATION TREATMENT: CPT

## 2024-11-24 RX ORDER — HEPARIN SODIUM 10000 [USP'U]/100ML
0-5000 INJECTION, SOLUTION INTRAVENOUS CONTINUOUS
Status: DISCONTINUED | OUTPATIENT
Start: 2024-11-24 | End: 2024-11-27

## 2024-11-24 RX ORDER — SODIUM CHLORIDE 9 MG/ML
1000 INJECTION, SOLUTION INTRAVENOUS CONTINUOUS
Status: CANCELLED | OUTPATIENT
Start: 2024-11-24

## 2024-11-24 RX ORDER — BUMETANIDE 1 MG/1
2 TABLET ORAL
Status: DISCONTINUED | OUTPATIENT
Start: 2024-11-24 | End: 2024-11-24

## 2024-11-24 RX ORDER — METOLAZONE 2.5 MG/1
2.5 TABLET ORAL ONCE
Status: COMPLETED | OUTPATIENT
Start: 2024-11-24 | End: 2024-11-24

## 2024-11-24 RX ORDER — LIDOCAINE 40 MG/G
CREAM TOPICAL
Status: CANCELLED | OUTPATIENT
Start: 2024-11-24

## 2024-11-24 RX ORDER — CEFUROXIME AXETIL 250 MG/1
250 TABLET ORAL EVERY 12 HOURS SCHEDULED
Status: COMPLETED | OUTPATIENT
Start: 2024-11-24 | End: 2024-11-26

## 2024-11-24 RX ORDER — BUMETANIDE 1 MG/1
2 TABLET ORAL DAILY
Status: DISCONTINUED | OUTPATIENT
Start: 2024-11-24 | End: 2024-11-24

## 2024-11-24 RX ORDER — BUMETANIDE 0.25 MG/ML
2 INJECTION, SOLUTION INTRAMUSCULAR; INTRAVENOUS EVERY 8 HOURS
Status: DISCONTINUED | OUTPATIENT
Start: 2024-11-24 | End: 2024-11-27

## 2024-11-24 RX ORDER — BUMETANIDE 0.25 MG/ML
2 INJECTION, SOLUTION INTRAMUSCULAR; INTRAVENOUS EVERY 12 HOURS
Status: DISCONTINUED | OUTPATIENT
Start: 2024-11-24 | End: 2024-11-24

## 2024-11-24 RX ORDER — HYDRALAZINE HYDROCHLORIDE 50 MG/1
100 TABLET, FILM COATED ORAL 3 TIMES DAILY
Status: DISCONTINUED | OUTPATIENT
Start: 2024-11-24 | End: 2024-11-25

## 2024-11-24 RX ORDER — BUDESONIDE 0.5 MG/2ML
0.5 INHALANT ORAL 2 TIMES DAILY
Status: DISCONTINUED | OUTPATIENT
Start: 2024-11-24 | End: 2024-11-26

## 2024-11-24 RX ORDER — POTASSIUM CHLORIDE 1500 MG/1
20 TABLET, EXTENDED RELEASE ORAL
Status: CANCELLED | OUTPATIENT
Start: 2024-11-24

## 2024-11-24 RX ORDER — PREDNISONE 20 MG/1
40 TABLET ORAL DAILY
Status: COMPLETED | OUTPATIENT
Start: 2024-11-24 | End: 2024-11-28

## 2024-11-24 RX ORDER — IPRATROPIUM BROMIDE AND ALBUTEROL SULFATE 2.5; .5 MG/3ML; MG/3ML
3 SOLUTION RESPIRATORY (INHALATION)
Status: DISCONTINUED | OUTPATIENT
Start: 2024-11-24 | End: 2024-12-02

## 2024-11-24 RX ADMIN — IPRATROPIUM BROMIDE AND ALBUTEROL SULFATE 3 ML: .5; 3 SOLUTION RESPIRATORY (INHALATION) at 19:25

## 2024-11-24 RX ADMIN — ACETAMINOPHEN 1000 MG: 500 TABLET, FILM COATED ORAL at 22:28

## 2024-11-24 RX ADMIN — HEPARIN SODIUM 550 UNITS/HR: 10000 INJECTION, SOLUTION INTRAVENOUS at 22:17

## 2024-11-24 RX ADMIN — ISOSORBIDE MONONITRATE 60 MG: 60 TABLET, EXTENDED RELEASE ORAL at 10:12

## 2024-11-24 RX ADMIN — NICOTINE 1 PATCH: 21 PATCH, EXTENDED RELEASE TRANSDERMAL at 10:11

## 2024-11-24 RX ADMIN — APIXABAN 2.5 MG: 2.5 TABLET, FILM COATED ORAL at 10:12

## 2024-11-24 RX ADMIN — HYDRALAZINE HYDROCHLORIDE 100 MG: 50 TABLET ORAL at 20:42

## 2024-11-24 RX ADMIN — HYDRALAZINE HYDROCHLORIDE 10 MG: 20 INJECTION INTRAMUSCULAR; INTRAVENOUS at 05:34

## 2024-11-24 RX ADMIN — AMLODIPINE BESYLATE 10 MG: 10 TABLET ORAL at 06:39

## 2024-11-24 RX ADMIN — BUMETANIDE 2 MG: 0.25 INJECTION INTRAMUSCULAR; INTRAVENOUS at 22:06

## 2024-11-24 RX ADMIN — BUMETANIDE 2 MG: 0.25 INJECTION INTRAMUSCULAR; INTRAVENOUS at 10:11

## 2024-11-24 RX ADMIN — FLUTICASONE FUROATE AND VILANTEROL TRIFENATATE 1 PUFF: 200; 25 POWDER RESPIRATORY (INHALATION) at 10:13

## 2024-11-24 RX ADMIN — METOPROLOL TARTRATE 50 MG: 50 TABLET, FILM COATED ORAL at 20:43

## 2024-11-24 RX ADMIN — AZITHROMYCIN DIHYDRATE 250 MG: 250 TABLET ORAL at 10:12

## 2024-11-24 RX ADMIN — CLOPIDOGREL BISULFATE 75 MG: 75 TABLET ORAL at 10:12

## 2024-11-24 RX ADMIN — CEFUROXIME AXETIL 250 MG: 250 TABLET ORAL at 20:42

## 2024-11-24 RX ADMIN — CEFUROXIME AXETIL 250 MG: 250 TABLET ORAL at 12:31

## 2024-11-24 RX ADMIN — BUMETANIDE 2 MG: 0.25 INJECTION INTRAMUSCULAR; INTRAVENOUS at 16:10

## 2024-11-24 RX ADMIN — PREDNISONE 40 MG: 20 TABLET ORAL at 12:30

## 2024-11-24 RX ADMIN — LEVALBUTEROL HYDROCHLORIDE 1.25 MG: 1.25 SOLUTION RESPIRATORY (INHALATION) at 13:01

## 2024-11-24 RX ADMIN — ROSUVASTATIN CALCIUM 10 MG: 10 TABLET, FILM COATED ORAL at 20:42

## 2024-11-24 RX ADMIN — BUDESONIDE 0.5 MG: 0.5 INHALANT RESPIRATORY (INHALATION) at 19:26

## 2024-11-24 RX ADMIN — METOPROLOL TARTRATE 50 MG: 50 TABLET, FILM COATED ORAL at 06:39

## 2024-11-24 RX ADMIN — BUMETANIDE 2 MG: 0.25 INJECTION INTRAMUSCULAR; INTRAVENOUS at 03:15

## 2024-11-24 RX ADMIN — GABAPENTIN 200 MG: 100 CAPSULE ORAL at 20:43

## 2024-11-24 RX ADMIN — HYDRALAZINE HYDROCHLORIDE 50 MG: 50 TABLET ORAL at 06:39

## 2024-11-24 RX ADMIN — IPRATROPIUM BROMIDE AND ALBUTEROL SULFATE 3 ML: .5; 3 SOLUTION RESPIRATORY (INHALATION) at 15:59

## 2024-11-24 RX ADMIN — METOLAZONE 2.5 MG: 2.5 TABLET ORAL at 16:10

## 2024-11-24 RX ADMIN — HYDRALAZINE HYDROCHLORIDE 100 MG: 50 TABLET ORAL at 16:10

## 2024-11-24 NOTE — PROGRESS NOTES
[facility]    Medicine Progress Note - Hospitalist Service    Date of Admission:  11/20/2024    Assessment & Plan   Shirley Hendricks is a 64 year old female, on Plavix, with a history of COPD, hypertension, CHF, peripheral artery disease, NSTEMI, stage 4 CKD, and tobacco use who is being admitted for evaluation of shortness of breath.      Acute hypoxic respiratory failure needing BiPAP and high flow nasal cannula  Acute on chronic diastolic heart failure in exacerbation:  Bilateral pleural effusions status post right thoracentesis on 11/20/2024, status post left thoracentesis on 11/21/2024  Type II NSTEMI likely due to demand due to heart failure exacerbation  # Community Acquired Pnuemonia  Assessment: Presents with 2-day history of increasing shortness of breath and a cough.  On admission chest x-ray shows a large left pleural effusion with compressive atelectasis.  There is also diffuse interstitial opacities consistent with pulmonary edema.  No pneumothorax was noted.  Labs are pertinent for a proBNP of 26,004-20, mildly elevated point of 98, creatinine 1.71. PTA on Norvasc 10 mg daily, atenolol 25 mg daily, torsemide 50 mg daily, Jardiance 10 mg daily  Plan:  - - Ultrasound guided thoracentesis on 11/20/24 s/p .1 liters of straw-colored fluid were removed and sent to lab, if requested.   - Monitor on telemetry  - Follow daily weights/I's and O's  - Cardiac diet  -- Continue BiPAP, wean as able  -Keep oxygen above 90%  -CT showed bilateral pleural effusions and repeat Thoracentesis ordered  -Hold diuresis today due to bump in creatinine  -CT scan also shows right-sided superimposed pneumonia and patient has been started on ceftriaxone and azithromycin  Keep oxygen saturation above 90%  -Limited echo ordered  -Duplex on 11/21/24 no DVT  -Patient refused right sided thoracentesis and left sided 1200ml drained yellow colored fluid was drained on 11/21/2024 ( Lights criteria  -Transudate  -Cytology and cultures pending   -Right-sided thoracentesis ordered and 1.5 l fluid removed on 11/22/24  -Pharmacy liaison consult for DOAC  IV heparin switched to oral Eliquis  -Patient had increased work of breathing and x-ray shows pleural effusions and pulmonary edema and patient started on Bumex 2 mg IV twice daily on 11/24/2024  Pulmonary team, cardiology consulted and are following.  Right heart cath on Monday   -Strict intake and output  -Monitor electrolytes closely  TTE done during this hospitalization on 11/22/2024 showed LVEF normal at 55 to 60%.  RV function normal.  Mild 1+ MR, 1+ TR.  Mild pulmonary hypertension.  Compared to prior study there is no significant change    Patient is currently on 50 L of oxygen by high flow nasal cannula.  Looks mildly tachypneic on exam on 11/24/2024  Continue Bumex IV for diuresis  Switched IV ceftriaxone to oral Ceftin twice daily for total of 1 week course of antibiotics.  Continue oral Z-Tobi for 5 days course  Switched IV Solu-Medrol to prednisone 40 mg p.o. daily 5 for 5 days  Wean off of oxygen as tolerated  Patient I's and O's are not accurate as she has been incontinent and PureWick has been leaking      # New onset atrial fibrillation with RVR  -Converted -Sinus bradycardia  -Patient started on heparin drip and transition to DOAC  -Atenolol discontinued on 11/21/24 and switched to metoprolol 50 twice daily started  Heart rate well-controlled in the 50s to 60s currently      Hypertensive urgency  Hyperlipidemia  Coronary artery disease with history of MI in 2019  Peripheral vascular disease with history of 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 2024  Assessment: PTA on amlodipine 10 mg, Coreg changed to atenolol 25 mg daily, hydralazine 50 mg 3 times daily, Imdur 60 mg daily, Crestor 10 mg  Plan:  - Continue metoprolol  -Continue amlodipine 10 mg  On 11/24/2024 patient's blood pressure has been running high systolics in the 170s to 180s.  Hydralazine dose increased  from 50 mg to to 100 mg 3 times daily on 11/24/2024    # Hypoanatremia   Mild   -Continue diuresis         Acute kidney injury  on CKD stage III  Assessment: Creatinine on admission of 1.71, which is within her baseline.1.2-1.4  Plan:  -Daily BMP while on IV diuresis  -Avoid NSAIDs/nephrotoxins  Creatinine improved to 1.89 and patient started on Bumex        History of COPD not in exacerbation  - Continue with Breo and as needed albuterol nebs available  Was on IV Solu-Medrol switched to p.o. prednisone for 5 days on 11/24/2024     GERD  - Continue with PTA famotidine     Anemia of Chronic Disease  Baseline hemoglobin has been between 8-9 over the past few months  Plan:  Hb 11.5 No active bleeding            History of thrombocytopenia  - Platelet count normal on admission     Tobacco use  - Continue with nicotine patch  -Counselled on cessation             Diet: 2 Gram Sodium Diet  Fluid restriction 1800 ML FLUID    DVT Prophylaxis: DOAC  Alvarez Catheter: Not present  Lines: None     Cardiac Monitoring: ACTIVE order. Indication: Acute decompensated heart failure (48 hours)  Code Status: Full Code      Clinically Significant Risk Factors         # Hyponatremia: Lowest Na = 130 mmol/L in last 2 days, will monitor as appropriate  # Hypochloremia: Lowest Cl = 94 mmol/L in last 2 days, will monitor as appropriate      # Hypoalbuminemia: Lowest albumin = 3 g/dL at 11/21/2024  4:42 AM, will monitor as appropriate     # Hypertension: Noted on problem list                  # Financial/Environmental Concerns: none         Social Drivers of Health    Tobacco Use: Medium Risk (11/14/2024)    Received from [employer]    Patient History     Smoking Tobacco Use: Former     Smokeless Tobacco Use: Never     Passive Exposure: Past          Disposition Plan           Medically Ready for Discharge: Anticipated in 2-4 Days      Barriers for discharge;  Improvement in hypoxia currently on high flow nasal cannula at 50  MIRTHA Peraza MD  Hospitalist Service  [facility]  Securely message with [employer] (more info)  Text page via [employer] Paging/Directory   ______________________________________________________________________    Interval History   Chart reviewed.  Discussed with bedside RN    Patient is resting comfortably in bed.  Complains of mild shortness of breath and mild tachypnea noted.  Continues to be on diuresis with IV Bumex.  Creatinine stable.  I's and O's are not accurate as patient has been incontinent.  No other acute issues      Physical Exam   Vital Signs: Temp: 97.6  F (36.4  C) Temp src: Oral BP: (!) 173/78 Pulse: 56   Resp: 14 SpO2: 90 % O2 Device: High Flow Nasal Cannula (HFNC) Oxygen Delivery: 50 LPM  Weight: 99 lbs 10.37 oz    Physical Exam  Cardiovascular:      Rate and Rhythm: Normal rate and regular rhythm.   Pulmonary:      Effort: Respiratory distress present.      Breath sounds: No wheezing or rales.      Comments: Mild respiratory distress  Abdominal:      General: There is no distension.      Palpations: Abdomen is soft.      Tenderness: There is no abdominal tenderness.   Musculoskeletal:      Right lower leg: No edema.      Left lower leg: No edema.   Neurological:      Mental Status: She is alert.          Medical Decision Making       52 MINUTES SPENT BY ME on the date of service doing chart review, history, exam, documentation & further activities per the note.      Data     I have personally reviewed the following data over the past 24 hrs:    9.0  \   11.2 (L)   / 308     131 (L) 97 (L) 83.2 (H) /  128 (H)   4.6 21 (L) 1.87 (H) \       Imaging results reviewed over the past 24 hrs:   No results found for this or any previous visit (from the past 24 hours).

## 2024-11-24 NOTE — PLAN OF CARE
"Goal Outcome Evaluation:    Patient Name: Larry  MRN: [id number]  Date of Admission: 11/20/2024  Reason for Admission: SOB, hypoxic respiratory failure, HF exacerbation, pleural effusions   [place] [facility]    Vitals:   BP Readings from Last 1 Encounters:   11/24/24 (!) 153/68     Pulse Readings from Last 1 Encounters:   11/24/24 56     Wt Readings from Last 1 Encounters:   11/23/24 44.5 kg (98 lb 1.7 oz)     Ht Readings from Last 1 Encounters:   11/20/24 1.575 m (5' 2\")     Estimated body mass index is 17.94 kg/m  as calculated from the following:    Height as of this encounter: 1.575 m (5' 2\").    Weight as of this encounter: 44.5 kg (98 lb 1.7 oz).  Temp Readings from Last 1 Encounters:   11/23/24 97.5  F (36.4  C) (Oral)       Pain: c/o headache, prn tylenol to manage     CV Surgery Patient: No    Assessment    Resp: HFNC 80% at 40L to keep sats > 90  ABNL. VITALS: Elevated BP, PRN hydralazine 1x for SBP> 180. AM blood pressure meds administered early.   Telemetry: SB/SR  Neuro: Intact  GI/: BS+, BM x2 overnight, Incontinent of urine, purewick in place, at HS pt request purewick be removed, pull up in place, unable to accurately measure output after bumex administration.   Skin/Wounds: Scattered bruising, healing scars.   Lines/Drains: PIV sl   Activity: A of 1 to [facility]. Able to shift weight independently in bed.   Sleep: Ok, between cares  Abnormal Labs: Declined overnight Magnesium draw for RN managed replacement protocol.     Aggression Stop Light: Green          Patient Care Plan: Wean o2 as able      "

## 2024-11-24 NOTE — PLAN OF CARE
Goal Outcome Evaluation:      Pt denies pain, HFNC 70% 50 LPM to keep O2 >88%, turns and repositions self in bed, started on Eliquis, good PO intake, external catheter, Tele SB/SR

## 2024-11-24 NOTE — PROGRESS NOTES
[facility] Cardiology Progress Note    Date of Admission:  11/20/2024  Reason for Consult:   ADHF in the setting of presumed HFpEF    Subjective   Ms. Hendricks is a very pleasant 65 yo lady who presents with acute hypoxemic respiratory failure with bilateral pleural effusions.    She doesn't feel any different in terms of her SOB. She continues to feel discouraged that we don't have an answer.    Objective   INTAKE / OUTPUT     Intake/Output Summary (Last 24 hours) at 11/24/2024 1236  Last data filed at 11/24/2024 0750  Gross per 24 hour   Intake 1520 ml   Output 250 ml   Net 1270 ml       VITAL SIGNS  Temp:  [97.5  F (36.4  C)-97.9  F (36.6  C)] 97.6  F (36.4  C)  Pulse:  [55-62] 56  Resp:  [14-26] 14  BP: (153-203)/(68-88) 173/78  FiO2 (%):  [70 %-80 %] 80 %  SpO2:  [89 %-98 %] 90 %  99 lbs 10.37 oz    PHYSICAL EXAM   Constitutional: Appears stated age, well nourished, in mild-moderate distress. On HFNC.  Neck: Supple.  JVD elevated.  Respiratory: Non-labored. Diminished sounds throughout.  Cardiovascular: RRR. Bilateral lower extremities with 1+ edema.  GI: Soft, non-distended, non-tender.  Skin: Warm  Neurologic: No gross focal deficits. Alert, awake.  Psychiatric: Affect appropriate. Mentation normal.      Data   Most Recent 3 CBC's:  Recent Labs   Lab Test 11/24/24  0828 11/23/24  1035 11/22/24  0651   WBC 9.0 9.6 8.2   HGB 11.2* 11.5* 10.4*   MCV 84 84 83    313 239     Most Recent 3  BMP's:  Recent Labs   Lab Test 11/24/24  0828 11/23/24  1035 11/22/24  0651   * 130* 130*   POTASSIUM 4.6 4.3 4.0   CHLORIDE 97* 94* 97*   CO2 21* 20* 21*   BUN 83.2* 74.6* 69.8*   CR 1.87* 1.89* 2.02*   ANIONGAP 13 16* 12   SONIA 8.3* 8.4* 8.0*   * 112* 126*     Most Recent 3 BNP's:  Recent Labs   Lab Test 11/20/24  1555 07/30/24  1233 07/21/24  0050   NTBNPI 26,420* 32,028* 7,421*      Most Recent Cholesterol Panel:  Recent Labs   Lab Test 10/03/23  0941   CHOL  137   LDL 69   HDL 54   TRIG 68       Most Recent Transthoracic Echocardiogram:  Echo result w/o MOPS: Interpretation Summary The left ventricle is normal in size.Left ventricular systolic function is normal.The visual ejection fraction is 55-60%.Normal left ventricular wall motionThe right ventricle is normal in structure, function and size.There is mild (1+) mitral regurgitation.There is mild (1+) tricuspid regurgitation.Right ventricular systolic pressure is elevated, consistent with mildpulmonary hypertension.Compared to prior study, there is no significant change.        Most Recent Cardiac Catheterization:  Cardiac Catheterization    Result Date: 10/4/2023    Prox RCA lesion is 45% stenosed.    RPAV-1 lesion is 30% stenosed.    RPAV-2 lesion is 50% stenosed.    1st Mrg lesion is 45% stenosed.    1st Diag lesion is 100% stenosed.    Dist Cx lesion is 55% stenosed.    Right dominant coronary anatomy.  Completely occluded (probably chronic) D1 with left to left collaterals   from distal LAD to D1.  Moderate coronary artery disease involving proximal to mid RCA which is   not hemodynamically significant-IFR negative.  Moderate coronary disease involving distal small-caliber RPL and distal   circumflex artery.      Assessment & Plan     Assessment     Ms. Hendricks is a very pleasant 65 yo lady who presents with acute hypoxemic respiratory failure with bilateral pleural effusions. Her pertinent comorbidities are listed below.    Acute hypoxemic respiratory failure  Likely multifactorial (presumed HFpEF exacerbation, COPD, large pleural effusions)  Presumed HFpEF exacerbation  CAD  D1  with collaterals from distal LAD, moderate disease elsewhere  Significant bilateral pleural effusions  S/p thoracentesis (right on 11/20, left on 11/21), 1-1.1 L of straw-colored fluid  Newly diagnosed atrial fibrillation with RVR  CHADS-VASc 5  Hx of stress-induced CM with recovered LVEF  PAD  S/p BKA 4/1/2024  S/p aortobifemoral  bypass and left fem-pop bypass  Right femoral graft limb to above the knee popliteal bypass graft 9/2020  S/p right femoral to anterior tibial PTFE bypass graft with single vessel runoff on 10/11 followed by embolectomy of her graft on 10/13  HTN  HLD  COPD  Active tobacco use disorder  Malignant B-cell lymphoma  Stage IIIb CKD  Previously required temporary dialysis  Elevated troponin likely due to chronic myocardial injury    She continues to feel short of breath. She continues to be hypertensive. Yesterday, she was initiated on bumex 2 mg IV BID and her creatinine did not deteriorate. Her weight did increase by about 1 lb.   On echocardiography, her E/A is consistent with restrictive physiology. There is valve thickening and the walls do appear slightly hyperechoic but not thickened. There is a D-shaped septum, which is new compared to echo earlier this year, which raises the possibility of acutely worsening pulmonary pressures, including PE or simply worsening left-sided heart failure. The acuity of her symptoms necessitate ruling out PE, as she is being treated with therapeutic anticoagulation. Another less likely possibility is amyloidosis, especially in the setting of B cell lymphoma.    Regardless, given her minimal response to bumex alone, I recommended increasing her diuretic regimen as noted below. Further, Dr. Peraza increased her hydralazine dosing. If that doesn't help, increase Imdur to 120 mg.     Further, she would highly benefit from RHC. Patient did receive 1 dose of apixaban 2.5 mg at 10 am on 11/24/24. I stopped it and switched her back to heparin.    Plan     Concur with obtaining V/Q scan   Continue heparin gtt and switch to DOAC indefinitely once all procedures are done   Continue PTA plavix, rosuvastatin  Continue amlodipine 10 mg once daily  Continue bumex 2 mg q 8 hours  Give metolazone 2.5 mg once today  Concur with increasing hydralazine from 50 to 100 mg TID  If her blood pressure remains  elevated, consider increasing the dose to 120 mg   Continue metoprolol tartrate 50 mg BID  Continue rosuvastatin 10 mg once daily    Thank you for involving us in the care of this patient.  We will continue to follow.    Fish Chacko MD on 11/24/2024 at 12:36 PM

## 2024-11-25 ENCOUNTER — TELEPHONE (OUTPATIENT)
Dept: CARDIOLOGY | Facility: CLINIC | Age: 64
End: 2024-11-25
Payer: COMMERCIAL

## 2024-11-25 LAB
ALBUMIN MFR UR ELPH: <6 MG/DL
ALBUMIN UR-MCNC: NEGATIVE MG/DL
ANION GAP SERPL CALCULATED.3IONS-SCNC: 13 MMOL/L (ref 7–15)
APPEARANCE UR: CLEAR
APTT PPP: 29 SECONDS (ref 22–38)
APTT PPP: 32 SECONDS (ref 22–38)
APTT PPP: 57 SECONDS (ref 22–38)
BACTERIA #/AREA URNS HPF: ABNORMAL /HPF
BACTERIA BLD CULT: NO GROWTH
BILIRUB UR QL STRIP: NEGATIVE
BUN SERPL-MCNC: 85.1 MG/DL (ref 8–23)
CALCIUM SERPL-MCNC: 8.6 MG/DL (ref 8.8–10.4)
CHLORIDE SERPL-SCNC: 96 MMOL/L (ref 98–107)
COLOR UR AUTO: ABNORMAL
CREAT SERPL-MCNC: 2 MG/DL (ref 0.51–0.95)
CREAT UR-MCNC: 37.1 MG/DL
CREAT UR-MCNC: 39 MG/DL
EGFRCR SERPLBLD CKD-EPI 2021: 27 ML/MIN/1.73M2
ERYTHROCYTE [DISTWIDTH] IN BLOOD BY AUTOMATED COUNT: 14.6 % (ref 10–15)
GLUCOSE SERPL-MCNC: 90 MG/DL (ref 70–99)
GLUCOSE UR STRIP-MCNC: NEGATIVE MG/DL
HCO3 SERPL-SCNC: 23 MMOL/L (ref 22–29)
HCT VFR BLD AUTO: 35.6 % (ref 35–47)
HGB BLD-MCNC: 11.4 G/DL (ref 11.7–15.7)
HGB UR QL STRIP: NEGATIVE
KETONES UR STRIP-MCNC: NEGATIVE MG/DL
LEUKOCYTE ESTERASE UR QL STRIP: NEGATIVE
MCH RBC QN AUTO: 26.7 PG (ref 26.5–33)
MCHC RBC AUTO-ENTMCNC: 32 G/DL (ref 31.5–36.5)
MCV RBC AUTO: 83 FL (ref 78–100)
MICROALBUMIN UR-MCNC: <12 MG/L
MICROALBUMIN/CREAT UR: NORMAL MG/G{CREAT}
MUCOUS THREADS #/AREA URNS LPF: PRESENT /LPF
NITRATE UR QL: NEGATIVE
PATH REPORT.COMMENTS IMP SPEC: NORMAL
PATH REPORT.FINAL DX SPEC: NORMAL
PATH REPORT.GROSS SPEC: NORMAL
PATH REPORT.MICROSCOPIC SPEC OTHER STN: NORMAL
PH UR STRIP: 5 [PH] (ref 5–7)
PLATELET # BLD AUTO: 277 10E3/UL (ref 150–450)
POTASSIUM SERPL-SCNC: 4.5 MMOL/L (ref 3.4–5.3)
PROT/CREAT 24H UR: NORMAL MG/G{CREAT}
RBC # BLD AUTO: 4.27 10E6/UL (ref 3.8–5.2)
RBC URINE: <1 /HPF
SODIUM SERPL-SCNC: 132 MMOL/L (ref 135–145)
SODIUM UR-SCNC: 91 MMOL/L
SP GR UR STRIP: 1.01 (ref 1–1.03)
SQUAMOUS EPITHELIAL: 1 /HPF
UROBILINOGEN UR STRIP-MCNC: NORMAL MG/DL
UUN UR-MCNC: 440 MG/DL (ref 801–1666)
WBC # BLD AUTO: 8.1 10E3/UL (ref 4–11)
WBC URINE: <1 /HPF

## 2024-11-25 PROCEDURE — 99233 SBSQ HOSP IP/OBS HIGH 50: CPT | Performed by: STUDENT IN AN ORGANIZED HEALTH CARE EDUCATION/TRAINING PROGRAM

## 2024-11-25 PROCEDURE — 85730 THROMBOPLASTIN TIME PARTIAL: CPT | Performed by: STUDENT IN AN ORGANIZED HEALTH CARE EDUCATION/TRAINING PROGRAM

## 2024-11-25 PROCEDURE — 250N000011 HC RX IP 250 OP 636: Performed by: STUDENT IN AN ORGANIZED HEALTH CARE EDUCATION/TRAINING PROGRAM

## 2024-11-25 PROCEDURE — 250N000012 HC RX MED GY IP 250 OP 636 PS 637: Performed by: INTERNAL MEDICINE

## 2024-11-25 PROCEDURE — 82043 UR ALBUMIN QUANTITATIVE: CPT | Performed by: INTERNAL MEDICINE

## 2024-11-25 PROCEDURE — 250N000013 HC RX MED GY IP 250 OP 250 PS 637: Performed by: STUDENT IN AN ORGANIZED HEALTH CARE EDUCATION/TRAINING PROGRAM

## 2024-11-25 PROCEDURE — 250N000013 HC RX MED GY IP 250 OP 250 PS 637: Performed by: INTERNAL MEDICINE

## 2024-11-25 PROCEDURE — 99232 SBSQ HOSP IP/OBS MODERATE 35: CPT | Performed by: INTERNAL MEDICINE

## 2024-11-25 PROCEDURE — 94640 AIRWAY INHALATION TREATMENT: CPT

## 2024-11-25 PROCEDURE — 250N000011 HC RX IP 250 OP 636: Performed by: INTERNAL MEDICINE

## 2024-11-25 PROCEDURE — 85014 HEMATOCRIT: CPT | Performed by: STUDENT IN AN ORGANIZED HEALTH CARE EDUCATION/TRAINING PROGRAM

## 2024-11-25 PROCEDURE — 120N000001 HC R&B MED SURG/OB

## 2024-11-25 PROCEDURE — 94640 AIRWAY INHALATION TREATMENT: CPT | Mod: 76

## 2024-11-25 PROCEDURE — 999N000157 HC STATISTIC RCP TIME EA 10 MIN

## 2024-11-25 PROCEDURE — 82565 ASSAY OF CREATININE: CPT | Performed by: STUDENT IN AN ORGANIZED HEALTH CARE EDUCATION/TRAINING PROGRAM

## 2024-11-25 PROCEDURE — 36415 COLL VENOUS BLD VENIPUNCTURE: CPT | Performed by: STUDENT IN AN ORGANIZED HEALTH CARE EDUCATION/TRAINING PROGRAM

## 2024-11-25 PROCEDURE — 81001 URINALYSIS AUTO W/SCOPE: CPT | Performed by: INTERNAL MEDICINE

## 2024-11-25 PROCEDURE — 84156 ASSAY OF PROTEIN URINE: CPT | Performed by: INTERNAL MEDICINE

## 2024-11-25 PROCEDURE — 80048 BASIC METABOLIC PNL TOTAL CA: CPT | Performed by: STUDENT IN AN ORGANIZED HEALTH CARE EDUCATION/TRAINING PROGRAM

## 2024-11-25 PROCEDURE — 250N000013 HC RX MED GY IP 250 OP 250 PS 637: Performed by: PHYSICIAN ASSISTANT

## 2024-11-25 PROCEDURE — 84540 ASSAY OF URINE/UREA-N: CPT | Performed by: INTERNAL MEDICINE

## 2024-11-25 PROCEDURE — 99222 1ST HOSP IP/OBS MODERATE 55: CPT | Performed by: INTERNAL MEDICINE

## 2024-11-25 PROCEDURE — 82570 ASSAY OF URINE CREATININE: CPT | Performed by: INTERNAL MEDICINE

## 2024-11-25 PROCEDURE — 84300 ASSAY OF URINE SODIUM: CPT | Performed by: INTERNAL MEDICINE

## 2024-11-25 PROCEDURE — 250N000009 HC RX 250: Performed by: INTERNAL MEDICINE

## 2024-11-25 RX ORDER — HYDRALAZINE HYDROCHLORIDE 50 MG/1
100 TABLET, FILM COATED ORAL 4 TIMES DAILY
Status: DISCONTINUED | OUTPATIENT
Start: 2024-11-25 | End: 2024-11-28

## 2024-11-25 RX ORDER — ISOSORBIDE MONONITRATE 30 MG/1
60 TABLET, EXTENDED RELEASE ORAL ONCE
Status: COMPLETED | OUTPATIENT
Start: 2024-11-25 | End: 2024-11-25

## 2024-11-25 RX ORDER — ISOSORBIDE MONONITRATE 60 MG/1
120 TABLET, EXTENDED RELEASE ORAL DAILY
Status: DISCONTINUED | OUTPATIENT
Start: 2024-11-26 | End: 2024-12-13 | Stop reason: HOSPADM

## 2024-11-25 RX ADMIN — ROSUVASTATIN CALCIUM 10 MG: 10 TABLET, FILM COATED ORAL at 20:36

## 2024-11-25 RX ADMIN — BUMETANIDE 2 MG: 0.25 INJECTION INTRAMUSCULAR; INTRAVENOUS at 05:51

## 2024-11-25 RX ADMIN — ISOSORBIDE MONONITRATE 60 MG: 60 TABLET, EXTENDED RELEASE ORAL at 09:24

## 2024-11-25 RX ADMIN — BUMETANIDE 2 MG: 0.25 INJECTION INTRAMUSCULAR; INTRAVENOUS at 15:27

## 2024-11-25 RX ADMIN — HYDRALAZINE HYDROCHLORIDE 100 MG: 50 TABLET ORAL at 15:27

## 2024-11-25 RX ADMIN — IPRATROPIUM BROMIDE AND ALBUTEROL SULFATE 3 ML: .5; 3 SOLUTION RESPIRATORY (INHALATION) at 07:37

## 2024-11-25 RX ADMIN — NICOTINE 1 PATCH: 21 PATCH, EXTENDED RELEASE TRANSDERMAL at 09:39

## 2024-11-25 RX ADMIN — CEFUROXIME AXETIL 250 MG: 250 TABLET ORAL at 20:36

## 2024-11-25 RX ADMIN — AMLODIPINE BESYLATE 10 MG: 10 TABLET ORAL at 09:24

## 2024-11-25 RX ADMIN — BUMETANIDE 2 MG: 0.25 INJECTION INTRAMUSCULAR; INTRAVENOUS at 22:11

## 2024-11-25 RX ADMIN — FLUTICASONE FUROATE AND VILANTEROL TRIFENATATE 1 PUFF: 200; 25 POWDER RESPIRATORY (INHALATION) at 09:25

## 2024-11-25 RX ADMIN — IPRATROPIUM BROMIDE AND ALBUTEROL SULFATE 3 ML: .5; 3 SOLUTION RESPIRATORY (INHALATION) at 19:30

## 2024-11-25 RX ADMIN — HYDRALAZINE HYDROCHLORIDE 10 MG: 20 INJECTION INTRAMUSCULAR; INTRAVENOUS at 06:28

## 2024-11-25 RX ADMIN — ACETAMINOPHEN 1000 MG: 500 TABLET, FILM COATED ORAL at 22:12

## 2024-11-25 RX ADMIN — IPRATROPIUM BROMIDE AND ALBUTEROL SULFATE 3 ML: .5; 3 SOLUTION RESPIRATORY (INHALATION) at 15:06

## 2024-11-25 RX ADMIN — HYDRALAZINE HYDROCHLORIDE 100 MG: 50 TABLET ORAL at 18:53

## 2024-11-25 RX ADMIN — BUDESONIDE 0.5 MG: 0.5 INHALANT RESPIRATORY (INHALATION) at 07:40

## 2024-11-25 RX ADMIN — AZITHROMYCIN DIHYDRATE 250 MG: 250 TABLET ORAL at 09:25

## 2024-11-25 RX ADMIN — HYDRALAZINE HYDROCHLORIDE 100 MG: 50 TABLET ORAL at 22:12

## 2024-11-25 RX ADMIN — PREDNISONE 40 MG: 20 TABLET ORAL at 09:24

## 2024-11-25 RX ADMIN — HYDRALAZINE HYDROCHLORIDE 100 MG: 50 TABLET ORAL at 09:25

## 2024-11-25 RX ADMIN — METOPROLOL TARTRATE 50 MG: 50 TABLET, FILM COATED ORAL at 09:24

## 2024-11-25 RX ADMIN — CEFUROXIME AXETIL 250 MG: 250 TABLET ORAL at 09:38

## 2024-11-25 RX ADMIN — BUDESONIDE 0.5 MG: 0.5 INHALANT RESPIRATORY (INHALATION) at 19:30

## 2024-11-25 RX ADMIN — HEPARIN SODIUM 1150 UNITS/HR: 10000 INJECTION, SOLUTION INTRAVENOUS at 21:07

## 2024-11-25 RX ADMIN — GABAPENTIN 200 MG: 100 CAPSULE ORAL at 20:36

## 2024-11-25 RX ADMIN — METOPROLOL TARTRATE 50 MG: 50 TABLET, FILM COATED ORAL at 20:36

## 2024-11-25 RX ADMIN — ISOSORBIDE MONONITRATE 60 MG: 30 TABLET, EXTENDED RELEASE ORAL at 10:42

## 2024-11-25 RX ADMIN — IPRATROPIUM BROMIDE AND ALBUTEROL SULFATE 3 ML: .5; 3 SOLUTION RESPIRATORY (INHALATION) at 11:22

## 2024-11-25 RX ADMIN — CLOPIDOGREL BISULFATE 75 MG: 75 TABLET ORAL at 09:25

## 2024-11-25 ASSESSMENT — ACTIVITIES OF DAILY LIVING (ADL)
ADLS_ACUITY_SCORE: 49
ADLS_ACUITY_SCORE: 0
ADLS_ACUITY_SCORE: 0
ADLS_ACUITY_SCORE: 49
ADLS_ACUITY_SCORE: 0
ADLS_ACUITY_SCORE: 49
ADLS_ACUITY_SCORE: 0
ADLS_ACUITY_SCORE: 49
ADLS_ACUITY_SCORE: 0
ADLS_ACUITY_SCORE: 0
ADLS_ACUITY_SCORE: 49
ADLS_ACUITY_SCORE: 0
ADLS_ACUITY_SCORE: 49
ADLS_ACUITY_SCORE: 0
ADLS_ACUITY_SCORE: 0
ADLS_ACUITY_SCORE: 49
ADLS_ACUITY_SCORE: 0
ADLS_ACUITY_SCORE: 49
ADLS_ACUITY_SCORE: 0

## 2024-11-25 NOTE — PLAN OF CARE
"Care provided from [id number].     Patient Name: Larry  MRN: [id number]  Date of Admission: 11/20/2024  Reason for Admission: SOB, bilateral pleural effusions, CAP, HF exacerbation  Level of Care: Medical-surgical    Vitals:   BP Readings from Last 1 Encounters:   11/25/24 (!) 156/69     Pulse Readings from Last 1 Encounters:   11/25/24 58     Wt Readings from Last 1 Encounters:   11/24/24 45.2 kg (99 lb 10.4 oz)     Ht Readings from Last 1 Encounters:   11/20/24 1.575 m (5' 2\")     Estimated body mass index is 18.23 kg/m  as calculated from the following:    Height as of this encounter: 1.575 m (5' 2\").    Weight as of this encounter: 45.2 kg (99 lb 10.4 oz).  Temp Readings from Last 1 Encounters:   11/25/24 97.8  F (36.6  C) (Oral)       Pain: Denies pain    CV Surgery Patient: No    Assessment    Resp: Hypertensive, otherwise VSS on HFNC 55L 92% FiO2. LS coarse. PRN IV hydralazine given x1 for SBP>180  Telemetry: SR/SB w/peaked T waves  Neuro: A&Ox4  GI/: NPO since midnight for procedure today, otherwise 2 g sodium diet w/1800mL FR. External catheter in place, excellent UO  Skin/Wounds: R AKA, scattered bruising  Lines/Drains: R PIV infusing heparin gtt at 850 units/hr, PTT recheck at 1233.   Activity: Assist x1 pivot to commode, not OOB this shift, repositions self in bed  Sleep: Slept comfortably between cares  Abnormal Labs: Creatinine 2.00    Aggression Stop Light: Green          Patient Care Plan: NPO since midnight for heart cath procedure today.       Goal Outcome Evaluation:      Plan of Care Reviewed With: patient    Overall Patient Progress: improving  "

## 2024-11-25 NOTE — PLAN OF CARE
"Patient Name: Larry  MRN: [id number]  Date of Admission: 11/20/2024  Reason for Admission: SOB, hypoxic resp failure, HF, pleural effusions   Level of Care: med-surg    Vitals:   BP Readings from Last 1 Encounters:   11/25/24 (!) 156/69     Pulse Readings from Last 1 Encounters:   11/25/24 58     Wt Readings from Last 1 Encounters:   11/24/24 45.2 kg (99 lb 10.4 oz)     Ht Readings from Last 1 Encounters:   11/20/24 1.575 m (5' 2\")     Estimated body mass index is 18.23 kg/m  as calculated from the following:    Height as of this encounter: 1.575 m (5' 2\").    Weight as of this encounter: 45.2 kg (99 lb 10.4 oz).  Temp Readings from Last 1 Encounters:   11/25/24 97.8  F (36.6  C) (Oral)       Pain: Pain goal 0/10 Pain Rating 7/10 for a headache  Effective pain medication/regimen tylenol per request     CV Surgery Patient: No    Assessment    Resp: on 92% fio2 on 55L high flow NC   Telemetry: SR/SB  Neuro: AOX4  GI/: voiding via purewick, pivot to the beside commode for BM, no BM on shift, NPO at midnight for procedure on 11/25  Skin/Wounds: blanchable redness on sacrum/coccyx, mepilex in place, old scars healed scars and scattered bruising and scabs   Lines/Drains: 1 PIV infusing hep gtt at 550 units/hr   Activity: ax1 bedside commode, able to move around in bed independently   Abnormal Labs: K+, MG+, and phos protocol     Aggression Stop Light: Green          Patient Care Plan: plan for RHC on 11/25      "

## 2024-11-25 NOTE — PLAN OF CARE
Goal Outcome Evaluation:    Pt is 92% on 55 LPM HFNC, repositions self in bed, Tele SB/SR, Bumex Q 8 hours, started on Metolazone PO, Good PO intake, BM,

## 2024-11-25 NOTE — PROGRESS NOTES
Inpatient Cardiology Consultation Progress Note:    Shirley Hendricks MRN#: [id number]   YOB: 1960 Age: 64 year old     Date of Admission: 11/20/2024  Consult indication: CHF         Assessment and Plan:     # Acute hypoxic respiratory failure 2/2 HFpEF, COPD, pleural effusions  # HTN urgency/emergency   # HFpEF exacerbation  # CAD   # New dx atrial fibrillation with RVR  # PAD s/p BKA  # HL  # COPD  # Smoking  # Malignant B cell lymphoma  # CKD3  # Elevated troponin, likely demand ischemia    -Plan was for right heart catheterization today, however blood pressures remain markedly elevated.  Will increase hydralazine to 100 mg 4 times daily, and increase Imdur to 120 mg daily  - Continue amlodipine 10 mg daily  - Continue bumetanide  - Continue clopidogrel  - Continue metoprolol tartrate, may transition to carvedilol with caution given COPD  - Continue heparin GTT while holding apixaban  - Anticipate right heart catheterization tomorrow pending improvement in hemodynamics  - Cardiology will follow      Thank you for allowing our team to participate in the care of Shirley Hendricks.  Please do not hesitate to page me with any questions or concerns.     Ryley Dillard MD, [facility]  Cardiology  November 25, 2024    Voice recognition software utilized.     Moderate complexity          Interval Events:     - no acute events overnight  - Pt denies any chest pain, chest discomfort  - no dizziness/lightheadedness  - no bleeding events  - telemetry reviewed  - interval labs and studies reviewed   - interval progress notes reviewed  - Pt updated on clinical course, plan for the day         Medications reviewed:     Current medications:  Current Facility-Administered Medications   Medication Dose Route Frequency Provider Last Rate Last Admin    acetaminophen (TYLENOL) tablet 1,000 mg  1,000 mg Oral Q6H PRN Marcin White MD   1,000 mg at 11/24/24 2228    albuterol (PROVENTIL HFA/VENTOLIN HFA)  inhaler  2 puff Inhalation Q4H PRN Marcin White MD        amLODIPine (NORVASC) tablet 10 mg  10 mg Oral Daily Marcin White MD   10 mg at 11/25/24 0924    [Held by provider] apixaban ANTICOAGULANT (ELIQUIS) tablet 2.5 mg  2.5 mg Oral BID Yoli Huizar MD   2.5 mg at 11/24/24 1012    budesonide (PULMICORT) neb solution 0.5 mg  0.5 mg Nebulization BID Eli Peraza MD   0.5 mg at 11/25/24 0740    bumetanide (BUMEX) injection 2 mg  2 mg Intravenous Q8H Fish Chacko MD   2 mg at 11/25/24 0551    calcium carbonate (TUMS) chewable tablet 1,000 mg  1,000 mg Oral 4x Daily PRN Marcin White MD        carboxymethylcellulose PF (REFRESH PLUS) 0.5 % ophthalmic solution 1 drop  1 drop Both Eyes Q1H PRN Marcin White MD        cefuroxime (CEFTIN) tablet 250 mg  250 mg Oral Q12H [facility] (08/20) Eli Peraza MD   250 mg at 11/25/24 0938    clopidogrel (PLAVIX) tablet 75 mg  75 mg Oral Daily Marcin White MD   75 mg at 11/25/24 0925    diphenhydrAMINE-zinc acetate (BENADRYL) 2-0.1 % cream   Topical TID PRN Carlos Hunter MD        fluticasone-vilanterol (BREO ELLIPTA) 200-25 MCG/ACT inhaler 1 puff  1 puff Inhalation Daily Marcin White MD   1 puff at 11/25/24 0925    gabapentin (NEURONTIN) capsule 200 mg  200 mg Oral At Bedtime Marcin White MD   200 mg at 11/24/24 2043    heparin 25,000 units in 0.45% NaCl 250 mL ANTICOAGULANT infusion  0-5,000 Units/hr Intravenous Continuous Fish Chacko MD 8.5 mL/hr at 11/25/24 0635 850 Units/hr at 11/25/24 0635    hydrALAZINE (APRESOLINE) injection 10 mg  10 mg Intravenous Q4H PRN Carlos Hunter MD   10 mg at 11/25/24 0628    hydrALAZINE (APRESOLINE) tablet 100 mg  100 mg Oral 4x Daily Lisa Zeng PA-C        ipratropium - albuterol 0.5 mg/2.5 mg/3 mL (DUONEB) neb solution 3 mL  3 mL Nebulization 4x daily Eli Peraza MD   3 mL at 11/25/24 1122    [START ON 11/26/2024] isosorbide mononitrate (IMDUR) 24 hr tablet 120 mg  120 mg Oral Daily Lisa Zeng PA-C         levalbuterol (XOPENEX) neb solution 1.25 mg  1.25 mg Nebulization Q6H PRN Yoli Huizar MD   1.25 mg at 11/24/24 1301    lidocaine (LMX4) cream   Topical Q1H PRN Marcin White MD        lidocaine 1 % 0.1-1 mL  0.1-1 mL Other Q1H PRN Marcin White MD        metoprolol tartrate (LOPRESSOR) tablet 50 mg  50 mg Oral BID Yoli Huizar MD   50 mg at 11/25/24 0924    nicotine (NICODERM CQ) 21 MG/24HR 24 hr patch 1 patch  1 patch Transdermal Daily Carlos Hunter MD   1 patch at 11/25/24 0939    Followed by    [START ON 1/2/2025] nicotine (NICODERM CQ) 14 MG/24HR 24 hr patch 1 patch  1 patch Transdermal Daily Carlos Hunter MD        Followed by    [START ON 1/30/2025] nicotine (NICODERM CQ) 7 MG/24HR 24 hr patch 1 patch  1 patch Transdermal Daily Carlos Hunter MD        nitroGLYcerin (NITROSTAT) sublingual tablet 0.4 mg  0.4 mg Sublingual Q5 Min PRN Marcin White MD   0.4 mg at 11/20/24 2119    No lozenges or gum should be given while patient on BIPAP/AVAPS/AVAPS AE   Does not apply Continuous PRN Marcin White MD        Patient may continue current oral medications   Does not apply Continuous PRN Marcin White MD        predniSONE (DELTASONE) tablet 40 mg  40 mg Oral Daily Eli Peraza MD   40 mg at 11/25/24 0924    prochlorperazine (COMPAZINE) injection 5 mg  5 mg Intravenous Q6H PRN Marcin White MD        Or    prochlorperazine (COMPAZINE) tablet 5 mg  5 mg Oral Q6H PRN Marcin White MD        rosuvastatin (CRESTOR) tablet 10 mg  10 mg Oral At Bedtime Marcin White MD   10 mg at 11/24/24 2042    senna-docusate (SENOKOT-S/PERICOLACE) 8.6-50 MG per tablet 1 tablet  1 tablet Oral BID PRN Marcin White MD        Or    senna-docusate (SENOKOT-S/PERICOLACE) 8.6-50 MG per tablet 2 tablet  2 tablet Oral BID PRN Marcin White MD        sodium chloride (PF) 0.9% PF flush 3 mL  3 mL Intracatheter Q8H Marcin White MD   3 mL at 11/25/24 0551    sodium chloride (PF) 0.9% PF flush 3 mL  3 mL  Intracatheter q1 min Marcin Rivas MD                 Physical Exam:   Vital signs were reviewed:  Temperatures:  Current - Temp: 97.9  F (36.6  C); Max - Temp  Av  F (36.7  C)  Min: 97.8  F (36.6  C)  Max: 98.4  F (36.9  C)  Respiration range: Resp  Av.2  Min: 9  Max: 22  Pulse range: Pulse  Av  Min: 54  Max: 73  Blood pressure range: Systolic (24hrs), Av , Min:156 , Max:192   ; Diastolic (24hrs), Av, Min:69, Max:85    Pulse oximetry range: SpO2  Av.6 %  Min: 88 %  Max: 94 %    Intake/Output Summary (Last 24 hours) at 2024 1214  Last data filed at 2024 1149  Gross per 24 hour   Intake 260 ml   Output 2250 ml   Net -1990 ml     95 lbs 3.82 oz  Body mass index is 17.42 kg/m .   Body surface area is 1.37 meters squared.    General/Constitutional: appears stated age, in no apparent distress, appears to be well nourished  Respiratory: clear to auscultation bilaterally  Cardiovascular: RRR, no murmurs          Selected laboratory tests:   Laboratory test results personally reviewed:   CMP  Recent Labs   Lab 24  0535 24  0828 24  1035 24  0651 24  1054 24  0442 24  0003 24  1555   * 131* 130* 130*  --  131*  --  133*   POTASSIUM 4.5 4.6 4.3 4.0  --  4.1  --  4.3   CHLORIDE 96* 97* 94* 97*  --  99  --  94*   CO2 23 21* 20* 21*  --  20*  --  21*   ANIONGAP 13 13 16* 12  --  12  --  18*   GLC 90 128* 112* 126*  --  81   < > 91   BUN 85.1* 83.2* 74.6* 69.8*  --  56.5*  --  54.5*   CR 2.00* 1.87* 1.89* 2.02*  --  2.02*  --  1.71*   GFRESTIMATED 27* 29* 29* 27*  --  27*  --  32*   SONIA 8.6* 8.3* 8.4* 8.0*  --  8.2*  --  9.1   MAG  --  2.4*  --   --   --  2.2  --   --    PROTTOTAL  --   --   --   --  6.7 5.7*  --  7.9   ALBUMIN  --   --   --   --   --  3.0*  --  4.0   BILITOTAL  --   --   --   --   --  0.2  --  0.3   ALKPHOS  --   --   --   --   --  66  --  90   AST  --   --   --   --   --  16  --  23   ALT  --   --   --   --   --   15  --  19    < > = values in this interval not displayed.     CBC  Recent Labs   Lab 11/25/24  0535 11/24/24  0828 11/23/24  1035 11/22/24  0651   WBC 8.1 9.0 9.6 8.2   RBC 4.27 4.18 4.31 3.78*   HGB 11.4* 11.2* 11.5* 10.4*   HCT 35.6 35.1 36.3 31.5*   MCV 83 84 84 83   MCH 26.7 26.8 26.7 27.5   MCHC 32.0 31.9 31.7 33.0   RDW 14.6 14.6 14.6 14.6    308 313 239     INRNo lab results found in last 7 days.  Lab Results   Component Value Date    TROPI <0.015 06/10/2020    TROPI <0.015 02/16/2020    TROPI <0.015 09/18/2019    TROPONIN <0.07 09/04/2005     Recent Labs   Lab Test 10/03/23  0941 02/15/22  0632   CHOL 137 124   HDL 54 48*   LDL 69 61   TRIG 68 76     Lab Results   Component Value Date    A1C 4.6 06/21/2024    A1C 5.2 01/08/2024    A1C 5.3 02/15/2022    A1C 5.4 09/23/2020    A1C 5.5 06/08/2020    A1C 5.4 05/11/2016    A1C 5.6 03/03/2016    A1C 5.4 07/30/2002     TSH   Date Value Ref Range Status   06/22/2024 2.07 0.30 - 4.20 uIU/mL Final   01/05/2021 0.77 0.40 - 4.00 mU/L Final

## 2024-11-25 NOTE — PROGRESS NOTES
Patient seen and evaluated with two RT skin assessment: Yes    Date patient was first placed on RT device: 11/21/24  Current respiratory device inspected: High flow nasal cannula  What other RT device/interface does the patient have at bedside for offloading pressure? HFNC is currently on patient, bipap can be set up if needed but is not currently in the room, O2 requirements are too high for oxymask at this time    Are there any suspected skin injuries?: No  Site appearance: clean and dry  What steps were taken to alleviate skin injury, if applicable? other (please specify)  -  Mepilex placed on ear straps and cheeks      Tamica Pedraza, RT on 11/25/2024 at 11:33 AM

## 2024-11-25 NOTE — TELEPHONE ENCOUNTER
[facility] Call Center    Phone Message    May a detailed message be left on voicemail: yes     Reason for Call: Other: Pt sister would like a call back to discuss pt health and concerns     Action Taken: Other: Cardio    Travel Screening: Not Applicable     Date of Service:

## 2024-11-25 NOTE — CONSULTS
[facility]    Nephrology Consultation     Date of Admission:  11/20/2024    Assessment & Plan     Shirley Hendricks is a 64 year old female who was admitted on 11/20/2024.     Assessment:  1) chronic kidney disease stage IIIb  Followed by [facility] neph[facility], Dr. Barboza and DARREL Roberson, last visit 11/14/2024    Noted history LIMA June/July 2024 requiring dialysis, discontinued 6/24    Hyperkalemia last month, spironolactone discontinued with potassium 6.0.  Potassium but normal this admission.    Mild hyponatremia, possibly due to hypovolemia.  GFR last 4 days ranging between 27 and 29.  Slightly lower than her baseline likely in the lower 30s.  This may be related to cardiorenal syndrome and HFpEF.  Agree with right heart cath to get a better assessment of volume status but for now continue diuresis.    2) hypervolemic state, history of HFpEF: Current regimen include 2 mg IV every 8 hours Bumex, received 2.5 mg metolazone 11/24.  No significant edema on exam, chest x-ray 2 days ago with pulmonary edema.    3) hypertension: Currently on amlodipine 10 mg, hydralazine 100 mg 3 times daily, Imdur 60 mg daily, metoprolol 50 g twice daily,  Sophie hypertensive, this morning 190 systolic.      Plan/Recs:  1) continue diuresis until right heart cath is performed  2) no specific nephrology interventions, avoid nephrotoxins contrast as able.  3) getting updated UA, urine proteinuria clarification as well as urine sodium, urea and creatinine  4) agree with antihypertensive changes per cardiology.  Avoid ACE ARB's and mineralcorticoid antagonists if able with history of hyperkalemia.    Sean Loving, DO  [facility] - Nephrology  [phone]  --------------------------------------------------------------------------------------------  Reason for Consult     I was asked to see the patient for acute kidney injury    History is obtained from the patient and chart review.      History of Present  Illness     Shirley Hendricks is a 64 year old female who was admitted on 11/20/2024 with dyspnea.  Admission creatinine 1.71 at her baseline.  Last month had a torsemide decreased to 30 mg daily.  Admitted with bilateral effusions and seen by pulmonary.  Followed by cardiology, felt that likely multifactorial respiratory failure including her HFpEF, COPD and contribution from her effusions.  Status post left thoracentesis on the 21st, right thoracentesis on the 22nd.  Received no contrast.  Treated for pneumonia with ceftriaxone and azithromyci, transition now to cefuroxime.  Patient frustrated she is still in bed and on high flow nasal cannula.  Also frustrated that her right heart cath was canceled today.  Appears this was canceled due to hypertension with further titration of her hydralazine being performed.    Past Medical History   I have reviewed this patient's medical history and updated it with pertinent information if needed.   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 clot    Post-menopausal     Reflux esophagitis 02/2004    EGD: esophagitis and medium HH    SBO (small bowel obstruction) (H) 08/10/2021    SVT (supraventricular  tachycardia) (H)     Thrombocytopenia (H)     Uncomplicated asthma     Vitamin C deficiency 08/12/2018       Past Surgical History   I have reviewed this patient's surgical history and updated it with pertinent information if needed.  Past Surgical History:   Procedure Laterality Date    AMPUTATE LEG ABOVE KNEE N/A 4/1/2024    Procedure: AMPUTATION, ABOVE KNEE right leg with wound vac dressing, excision of anteriotibial bypass graft, closure of (previous interventional radiology) left groin incision;  Surgeon: José Luis Hernandez MD;  Location:  OR    AMPUTATE LEG ABOVE KNEE Right 4/9/2024    Procedure: COMPLETION RIGHT ABOVE KNEE AMPUTATION;  Surgeon: José Luis Hernandez MD;  Location:  OR    ANGIOGRAM      ANGIOGRAM Right 6/23/2021    Procedure: RIGHT LOWER EXTREMITY ANGIOGRAM WITH LEFT BRACHIAL ARTERY CUTDOWN;  Surgeon: José Luis Hernandez MD;  Location:  OR    APPLY WOUND VAC Right 5/16/2024    Procedure: PLACEMENT OF WOUND VAC TO RIGHT ABOVE KNEE AMPUTATION;  Surgeon: Tay Enciso MD;  Location:  OR    APPLY WOUND VAC N/A 5/20/2024    Procedure: AND PLACEMENT OF WOUND VAC;  Surgeon: José Luis Hernandez MD;  Location:  OR    BIOPSY MASS NECK Right 10/11/2023    Procedure: Right Parotid Biopsy;  Surgeon: Randal Mendoza MD;  Location:  OR    BRONCHOSCOPY FLEXIBLE AND RIGID N/A 6/26/2024    Procedure: Bronchoscopy flexible and rigid;  Surgeon: Rod Nath MD;  Location:  GI    BYPASS GRAFT FEMOROPOPLITEAL Right 09/23/2020    Procedure: RIGHT FEMORAL GRAFT TO ABOVE-KNEE POPLITEAL BYPASS USING CADAVERIC SUPERFICIAL FEMORAL ARTERY;  Surgeon: Chadwick Lozano MD;  Location:  OR    BYPASS GRAFT FEMOROPOPLITEAL Right 2/15/2022    Procedure: RIGHT SUPERFICIAL FEMORAL ARTERY GRAFT TO BELOW KNEE POPLITEAL BYPASS WITH CADAVERIC CRYOLIFE  FEMORAL-POPLITEAL ARTERY SIZE: OUTER DIAMETER: 7MM - 6MM;  Surgeon: Chadwick Lozano;  Location:  OR    BYPASS GRAFT FEMOROPOPLITEAL  Right 5/26/2023    Procedure: RIGHT DISTAL SUPERFICIAL FEMORAL GRAFT TO ANTERIOR TIBIAL ARTERY WITH 26 CENTIMETER CADAVERIC SUPERFICIAL FEMORAL ARTERY GRAFT;  Surgeon: Chadwick Lozano MD;  Location:  OR    BYPASS GRAFT FEMOROPOPLITEAL Right 10/11/2023    Procedure: RIGHT FEMORAL TO POPLITEAL GRAFT THROMBECTOMY;  Surgeon: Chadwick Lozano MD;  Location:  OR    BYPASS GRAFT INSITU FEMOROPOPLITEAL Right 7/7/2021    Procedure: CREATION RIGHT FEMORAL TO POPLITEAL ARTERIAL BYPASS USING INSITU VEIN GRAFT;  Surgeon: José Luis Hernandez MD;  Location:  OR    CARDIAC CATHERIZATION  09/03/2009    multivessel CAD without target lesions, med mgmt indicated, preserved ef    CARPAL TUNNEL RELEASE RT/LT Right 05/20/2021    COLONOSCOPY N/A 12/12/2023    Procedure: Colonoscopy;  Surgeon: Corey Hoffman MD;  Location:  GI    COLONOSCOPY N/A 12/14/2023    Procedure: Colonoscopy;  Surgeon: Corey Hoffman MD;  Location:  GI    CV CORONARY ANGIOGRAM N/A 10/4/2023    Procedure: Coronary Angiogram;  Surgeon: Rogelio Ricks MD;  Location:  HEART CARDIAC CATH LAB    CV PCI N/A 10/4/2023    Procedure: Percutaneous Coronary Intervention;  Surgeon: Rogelio Ricks MD;  Location:  HEART CARDIAC CATH LAB    EMBOLECTOMY LOWER EXTREMITY Right 10/6/2023    Procedure: Right anterior tibial bypass with graft, Right tibial endarterectomy,thrombectomy, Right doraslis pedis thrombectomy, Anterior Tibial vein patch.;  Surgeon: Chadwick Lozano MD;  Location:  OR    ENDARTERECTOMY CAROTID Right 05/11/2016    Procedure: ENDARTERECTOMY CAROTID;  Surgeon: Chadwick Lozano MD;  Location:  OR    ENDARTERECTOMY CAROTID Left 06/08/2020    Procedure: LEFT CAROTID ENDARTERECTOMY with distal facal vein patch  and EEG;  Surgeon: Chadwick Lozano MD;  Location:  OR    ESOPHAGOSCOPY, GASTROSCOPY, DUODENOSCOPY (EGD), COMBINED N/A 12/12/2023    Procedure: Esophagoscopy,  gastroscopy, duodenoscopy (EGD), combined;  Surgeon: Corey Hoffman MD;  Location:  GI    FINE NEEDLE ASPIRATION WITHOUT IMAGING GUIDANCE Right 9/22/2023    Procedure: Fine needle aspiration without imaging guidance;  Surgeon: Kiersten Aguilera MD;  Location: [facility] GI    FLUORESCENCE INTRAOPERATIVE VASCULAR ANGIOGRAPHY Right 12/28/2022    Procedure: RIGHT LEG ANGIOGRAM with intervention;  Surgeon: Chadwick Lozano MD;  Location:  OR    GYN SURGERY  left tube    left salpingectomy    IR ANGIOGRAM THROUGH CATHETER (ARTERIAL)  10/6/2023    IR ANGIOGRAM THROUGH CATHETER (ARTERIAL)  10/6/2023    IR ANGIOGRAM THROUGH CATHETER (ARTERIAL)  3/31/2024    IR ANGIOGRAM THROUGH CATHETER (ARTERIAL)  3/30/2024    IR CHEST TUBE PLACEMENT NON-TUNNELLED LEFT  6/23/2024    IR CVC TUNNEL PLACEMENT > 5 YRS OF AGE  4/3/2024    IR CVC TUNNEL PLACEMENT > 5 YRS OF AGE  6/23/2024    IR CVC TUNNEL REMOVAL RIGHT  5/28/2024    IR CVC TUNNEL REMOVAL RIGHT  7/3/2024    IR CVC TUNNEL REVISION RIGHT  4/15/2024    IR LOWER EXTREMITY ANGIOGRAM RIGHT  05/25/2021    IR LOWER EXTREMITY ANGIOGRAM RIGHT  10/5/2023    IR LOWER EXTREMITY ANGIOGRAM RIGHT  3/29/2024    IR OR ANGIOGRAM  6/23/2021    IR OR ANGIOGRAM  12/28/2022    IRRIGATION AND DEBRIDEMENT LOWER EXTREMITY, COMBINED Right 5/16/2024    Procedure: IRRIGATION AND DEBRIDEMENT OF RIGHT ABOVE KNEE AMPUTATION;  Surgeon: Tay Enciso MD;  Location:  OR    IRRIGATION AND DEBRIDEMENT LOWER EXTREMITY, COMBINED Right 5/20/2024    Procedure: IRRIGATION AND DEBRIDMENT RIGHT LOWER EXTREMITY;  Surgeon: José Luis Hernandez MD;  Location:  OR    LAPAROSCOPIC CHOLECYSTECTOMY N/A 09/27/2017    Procedure: LAPAROSCOPIC CHOLECYSTECTOMY;  LAPAROSCOPIC CHOLECYSTECTOMY;  Surgeon: Jacoby Taipa MD;  Location:  SD    LAPAROSCOPY DIAGNOSTIC (GENERAL) N/A 8/11/2021    Procedure: Exploratory laparoscopy,  laparoscopic lysis of adhesions, laparotomy;  Surgeon: Corina Ferreira MD;   Location:  OR    OR ANGIOGRAM, LOWER EXTREMITY Right 10/11/2023    Procedure: Or Angiogram, Lower Extremity;  Surgeon: Chadwick Lozano MD;  Location:  OR    ORTHOPEDIC SURGERY      left knee surgery    REPAIR ANEURYSM FEMORAL ARTERY Left 12/28/2022    Procedure: REPAIR LEFT FEMORAL PSEUDOANEURYSM;  Surgeon: Chadwick Lozano MD;  Location:  OR    VASCULAR SURGERY  aoto bi fem  left fem-AK pop    [facility] FABRIC WRAPPING OF ABDOMINAL ANEURYSM      [facility] NONSPECIFIC PROCEDURE  12/2000    angioplasty with stent right fem. a.    [facility] NONSPECIFIC PROCEDURE  1987    sinus surgery    [facility] NONSPECIFIC PROCEDURE  09/02/2009    Emergent left groin exploration with oversewing of bleeding angiographic site. 2. Endarterectomy of common femoral-proximal superficial femoral artery with greater saphenous vein patch angioplasty.   a. Lejunior of accessory right greater saphenous vein.     [facility] NONSPECIFIC PROCEDURE  08/27/2009    occluded left common iliac and external iliac arteries were successfully revascularized with stenting to 8 and 7 mm        Prior to Admission Medications   Prior to Admission Medications   Prescriptions Last Dose Informant Patient Reported? Taking?   acetaminophen (TYLENOL) 500 MG tablet   No Yes   Sig: Take 1-2 tablets (500-1,000 mg) by mouth every 6 hours as needed for mild pain   amLODIPine (NORVASC) 10 MG tablet 11/20/2024 Morning  No Yes   Sig: Take 1 tablet (10 mg) by mouth daily   atenolol (TENORMIN) 25 MG tablet 11/20/2024 Morning  No Yes   Sig: Take 1 tablet (25 mg) by mouth daily.   budesonide-formoterol (SYMBICORT) 160-4.5 MCG/ACT Inhaler 11/20/2024 Morning  No Yes   Sig: Inhale 2 puffs into the lungs two times daily Disp 3 inhalers   clopidogrel (PLAVIX) 75 MG tablet 11/20/2024 Morning  No Yes   Sig: Take 1 tablet (75 mg) by mouth daily.   empagliflozin (JARDIANCE) 10 MG TABS tablet 11/20/2024 Morning  No Yes   Sig: Take 1 tablet (10 mg) by mouth daily.   gabapentin (NEURONTIN) 100 MG  capsule 11/19/2024 Bedtime  No Yes   Sig: Take 2 capsules (200 mg) by mouth at bedtime   hydrALAZINE (APRESOLINE) 50 MG tablet 11/20/2024 Morning  Yes Yes   Sig: Take 1 tablet (50 mg) by mouth 3 times daily.   isosorbide mononitrate (IMDUR) 60 MG 24 hr tablet 11/20/2024 Morning  No Yes   Sig: Take 1 tablet (60 mg) by mouth daily.   nitroGLYcerin (NITROSTAT) 0.4 MG sublingual tablet  Self No Yes   Sig: Place 1 tablet (0.4 mg) under the tongue See Admin Instructions for chest pain   rosuvastatin (CRESTOR) 10 MG tablet 11/19/2024 Bedtime  No Yes   Sig: Take 1 tablet (10 mg) by mouth at bedtime   torsemide (DEMADEX) 10 MG tablet 11/20/2024 Morning  Yes Yes   Sig: Take 30 mg by mouth daily.      Facility-Administered Medications: None     Allergies   Allergies   Allergen Reactions    Contrast Dye Anaphylaxis     Pt reported facial and throat swelling with prior CT contrast    Pantoprazole      Protonix caused diffuse edema    Chantix [Varenicline]      Terrible dreams    Gluten Meal GI Disturbance     Pt has celiac disease    Penicillins Itching and Rash       Social History   I have reviewed this patient's social history and updated it with pertinent information if needed. Shirley Hendricks  reports that she has quit smoking. Her smoking use included cigarettes. She started smoking about 56 years ago. She has a 14.2 pack-year smoking history. She has never used smokeless tobacco. She reports that she does not currently use alcohol. She reports current drug use. Drug: Marijuana.    Family History   I have reviewed this patient's family history and updated it with pertinent information if needed.   Family History   Problem Relation Age of Onset    Cancer Mother         bladder    Cardiovascular Father         alive,multiple heart attacks    Diabetes Maternal Grandmother     Lung Cancer Maternal Grandmother     Blood Disease Brother         clotting disorder       Review of Systems   The 10 point Review of Systems is  negative other than noted in the HPI.     Physical Exam   Temp: 97.9  F (36.6  C) Temp src: Oral BP: (!) 186/83 Pulse: 62   Resp: 22 SpO2: 92 % O2 Device: High Flow Nasal Cannula (HFNC) Oxygen Delivery: 55 LPM  Vital Signs with Ranges  Temp:  [97.8  F (36.6  C)-98.4  F (36.9  C)] 97.9  F (36.6  C)  Pulse:  [54-73] 62  Resp:  [9-22] 22  BP: (156-192)/(69-85) 186/83  FiO2 (%):  [80 %-95 %] 92 %  SpO2:  [88 %-94 %] 92 %  95 lbs 3.82 oz    GENERAL: Frail, appears older than stated age  HEENT:  Normocephalic. No gross abnormalities  CV: RRR, 1/6 systolic murmur, no dependent edema or in left lower extremity  RESP: Clear bilaterally with good efforts  GI: Abdomen soft/nt/nd, BS normal.   MUSCULOSKELETAL: Right leg amputation  SKIN: no suspicious lesions or rashes, dry to touch  PSYCH: mood good, affect appropriate    Data   BMP  Recent Labs   Lab 11/25/24  0535 11/24/24  0828 11/23/24  1035 11/22/24  0651   * 131* 130* 130*   POTASSIUM 4.5 4.6 4.3 4.0   CHLORIDE 96* 97* 94* 97*   SONIA 8.6* 8.3* 8.4* 8.0*   CO2 23 21* 20* 21*   BUN 85.1* 83.2* 74.6* 69.8*   CR 2.00* 1.87* 1.89* 2.02*   GLC 90 128* 112* 126*     Phos@LABRCNTIPR(phos:4)  CBC)  Recent Labs   Lab 11/25/24  0535 11/24/24  0828 11/23/24  1035 11/22/24  0651   WBC 8.1 9.0 9.6 8.2   HGB 11.4* 11.2* 11.5* 10.4*   HCT 35.6 35.1 36.3 31.5*   MCV 83 84 84 83    308 313 239     Recent Labs   Lab 11/21/24  0442   AST 16   ALT 15   ALKPHOS 66   BILITOTAL 0.2     No lab results found in last 7 days.  25 OH Vit D2   Date Value Ref Range Status   10/14/2010 25 ug/L Final     25 OH Vit D3   Date Value Ref Range Status   10/14/2010 14 ug/L Final     25 OH Vit D total   Date Value Ref Range Status   10/14/2010 39 30 - 75 ug/L Final     Comment:     Season, race, dietary intake, and treatment affect the concentration of   25-hydroxy-Vitamin D. Values may decrease during winter months and increase   during summer months. Values less than 30 ug/L may indicate Vitamin  "D   deficiency.     Recent Labs   Lab 11/25/24  0535   HGB 11.4*   HCT 35.6   MCV 83     No results for input(s): \"PTHI\" in the last 168 hours.    "

## 2024-11-25 NOTE — PROGRESS NOTES
[facility]    Medicine Progress Note - Hospitalist Service    Date of Admission:  11/20/2024    Assessment & Plan   Shirley Hendricks is a 64 year old female, on Plavix, with a history of COPD, hypertension, CHF, peripheral artery disease, NSTEMI, stage 4 CKD, and tobacco use who is being admitted for evaluation of shortness of breath.      Acute hypoxic respiratory failure   Acute on chronic diastolic heart failure in exacerbation:  Moderate to severe left pleural effusion status post thoracentesis  Type II NSTEMI likely due to demand due to heart failure exacerbation  # Community Acquired Pnuemonia  Assessment: Presents with 2-day history of increasing shortness of breath and a cough.  On admission chest x-ray shows a large left pleural effusion with compressive atelectasis.  There is also diffuse interstitial opacities consistent with pulmonary edema.  No pneumothorax was noted.  Labs are pertinent for a proBNP of 26,004-20, mildly elevated point of 98, creatinine 1.71. PTA on Norvasc 10 mg daily, atenolol 25 mg daily, torsemide 50 mg daily, Jardiance 10 mg daily  Plan:  - - Ultrasound guided thoracentesis on 11/20/24 s/p .1 liters of straw-colored fluid were removed and sent to lab, if requested.   -CT showed bilateral pleural effusions and repeat Thoracentesis ordered  -Keep oxygen saturation above 90%  -Duplex on 11/21/24 no DVT  --Patient refused right sided thoracentesis and left sided 1200ml drained yellow colored fluid was drained on 11/21/24 .  Lights criteria consistent with transudate.  Cultures negative  Right-sided thoracentesis ordered and 1.5 l fluid removed on 11/22/24  --Solu-Medrol changed to prednisone on 11/24 for a total of 5 days  -TE done during this hospitalization on 11/22/2024 showed LVEF normal at 55 to 60%.  RV function normal.  Mild 1+ MR, 1+ TR.  Mild pulmonary hypertension.  Compared to prior study there is no significant change     -Apixaban on hold due  to right heart cath tomorrow  -Continue cefuroxime for 2 more days and patient completed azithromycin course  Hydralazine increased to 100 mg 4 times a day  -Isosorbide increased from 60 mg to 120 mg  -Continue Bumex 2 mg every 8 hours  -Monitor electrolytes and potassium while on diuresis  -VQ scan could not be done due to oxygen requirements  -Keep oxygen saturations above 80%    Intake/Output Summary (Last 24 hours) at 11/25/2024 1652  Last data filed at 11/25/2024 1149  Gross per 24 hour   Intake 20 ml   Output 2250 ml   Net -2230 ml        # Hypertensive emergency  -Hydralazine increased to 100 mg 4 times a day  -Isosorbide increased from 60 mg to 120 mg  -Continue Bumex 2 mg every 8 hours  -Monitor electrolytes and potassium while on diuresis        # New onset atrial fibrillation with RVR  -Converted -Sinus bradycardia  -Patient started on heparin drip and transition to DOAC  -Atenolol discontinued on 11/21/24 and metoprolol 50 twice daily started        Hypertension  Hyperlipidemia  Coronary artery disease with history of MI in 2019  Peripheral vascular disease with history of 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 2024  Assessment: PTA on amlodipine 10 mg, Coreg changed to atenolol 25 mg daily, hydralazine 50 mg 3 times daily, Imdur 60 mg daily, Crestor 10 mg  Plan:  -See above for management of her hypertension        # Mild hyponatremia  -Continue diuresis         Acute kidney injury  on CKD stage III  Assessment: Creatinine on admission of 1.71, which is within her baseline.1.2-1.4  Plan:  -Daily BMP while on IV diuresis  -Avoid NSAIDs/nephrotoxins  Creatinine improved to 1.89 and patient started on Bumex  -Nephrology consulted  -Nephrology ordered UA urine proteinuria, urine sodium, urea and creatinine  -Avoid ACE inhibitors and mineralocorticoid antagonist afebrile with history of right total hip        History of COPD not in exacerbation  - Continue with Breo and as needed albuterol nebs available     GERD  - Continue  "with PTA famotidine     Anemia of Chronic Disease  Baseline hemoglobin has been between 8-9 over the past few months  Plan:  Hb 11.5 No active bleeding            History of thrombocytopenia  - Platelet count normal on admission     Tobacco use  - Continue with nicotine patch  -Counselled on cessation             Diet: Fluid restriction 1800 ML FLUID  2 Gram Sodium Diet  NPO for Medical/Clinical Reasons Except for: Meds    DVT Prophylaxis: Heparin GTT  Alvarez Catheter: Not present  Lines: None     Cardiac Monitoring: ACTIVE order. Indication: Acute decompensated heart failure (48 hours)  Code Status: Full Code      Clinically Significant Risk Factors         # Hyponatremia: Lowest Na = 131 mmol/L in last 2 days, will monitor as appropriate  # Hypochloremia: Lowest Cl = 96 mmol/L in last 2 days, will monitor as appropriate      # Hypoalbuminemia: Lowest albumin = 3 g/dL at 11/21/2024  4:42 AM, will monitor as appropriate     # Hypertension: Noted on problem list            # Cachexia: Estimated body mass index is 17.42 kg/m  as calculated from the following:    Height as of this encounter: 1.575 m (5' 2\").    Weight as of this encounter: 43.2 kg (95 lb 3.8 oz).        # Financial/Environmental Concerns: none         Social Drivers of Health    Tobacco Use: Medium Risk (11/14/2024)    Received from [facility]    Patient History     Smoking Tobacco Use: Former     Smokeless Tobacco Use: Never     Passive Exposure: Past          Disposition Plan     Medically Ready for Discharge: Anticipated in 2-4 Days             Yoli Huizar MD  Hospitalist Service  [facility]  Securely message with [employer] (more info)  Text page via [employer] Paging/Directory   ______________________________________________________________________    Interval History   Patient seen and examined at bedside.  No acute overnight events.  Blood pressure is elevated.  -Patient shortness of breath has " improved.  Continues to be on high flow oxygen.    Frustrated that she is not able to move outside the bed because of the high flow oxygen.    Physical Exam   Vital Signs: Temp: 98.1  F (36.7  C) Temp src: Oral BP: (!) 161/68 Pulse: 62   Resp: 22 SpO2: 92 % O2 Device: High Flow Nasal Cannula (HFNC) Oxygen Delivery: 55 LPM  Weight: 95 lbs 3.82 oz    Physical Exam  Cardiovascular:      Rate and Rhythm: Normal rate and regular rhythm.   Pulmonary:      Effort: No respiratory distress.      Breath sounds: Normal breath sounds. No wheezing.   Abdominal:      General: There is no distension.      Palpations: Abdomen is soft.      Tenderness: There is no abdominal tenderness.   Musculoskeletal:      Right lower leg: No edema.      Left lower leg: No edema.          Medical Decision Making         Data     I have personally reviewed the following data over the past 24 hrs:    8.1  \   11.4 (L)   / 277     132 (L) 96 (L) 85.1 (H) /  90   4.5 23 2.00 (H) \     INR:  N/A PTT:  57 (H)   D-dimer:  N/A Fibrinogen:  N/A       Imaging results reviewed over the past 24 hrs:   No results found for this or any previous visit (from the past 24 hours).

## 2024-11-26 LAB
ANION GAP SERPL CALCULATED.3IONS-SCNC: 13 MMOL/L (ref 7–15)
APTT PPP: 157 SECONDS (ref 22–38)
APTT PPP: 38 SECONDS (ref 22–38)
APTT PPP: 88 SECONDS (ref 22–38)
BACTERIA PLR CULT: NO GROWTH
BUN SERPL-MCNC: 94.3 MG/DL (ref 8–23)
CALCIUM SERPL-MCNC: 8.1 MG/DL (ref 8.8–10.4)
CHLORIDE SERPL-SCNC: 92 MMOL/L (ref 98–107)
CREAT SERPL-MCNC: 2.14 MG/DL (ref 0.51–0.95)
EGFRCR SERPLBLD CKD-EPI 2021: 25 ML/MIN/1.73M2
ERYTHROCYTE [DISTWIDTH] IN BLOOD BY AUTOMATED COUNT: 14.6 % (ref 10–15)
GLUCOSE SERPL-MCNC: 116 MG/DL (ref 70–99)
GRAM STAIN RESULT: NORMAL
GRAM STAIN RESULT: NORMAL
HCO3 SERPL-SCNC: 23 MMOL/L (ref 22–29)
HCT VFR BLD AUTO: 33.2 % (ref 35–47)
HGB BLD-MCNC: 10.8 G/DL (ref 11.7–15.7)
MAGNESIUM SERPL-MCNC: 2.5 MG/DL (ref 1.7–2.3)
MCH RBC QN AUTO: 26.9 PG (ref 26.5–33)
MCHC RBC AUTO-ENTMCNC: 32.5 G/DL (ref 31.5–36.5)
MCV RBC AUTO: 83 FL (ref 78–100)
PLATELET # BLD AUTO: 272 10E3/UL (ref 150–450)
POTASSIUM SERPL-SCNC: 4.6 MMOL/L (ref 3.4–5.3)
PROCALCITONIN SERPL IA-MCNC: 0.07 NG/ML
RBC # BLD AUTO: 4.02 10E6/UL (ref 3.8–5.2)
SODIUM SERPL-SCNC: 128 MMOL/L (ref 135–145)
WBC # BLD AUTO: 7.3 10E3/UL (ref 4–11)

## 2024-11-26 PROCEDURE — 250N000013 HC RX MED GY IP 250 OP 250 PS 637: Performed by: STUDENT IN AN ORGANIZED HEALTH CARE EDUCATION/TRAINING PROGRAM

## 2024-11-26 PROCEDURE — 250N000011 HC RX IP 250 OP 636: Performed by: INTERNAL MEDICINE

## 2024-11-26 PROCEDURE — 36415 COLL VENOUS BLD VENIPUNCTURE: CPT | Performed by: STUDENT IN AN ORGANIZED HEALTH CARE EDUCATION/TRAINING PROGRAM

## 2024-11-26 PROCEDURE — 84145 PROCALCITONIN (PCT): CPT | Performed by: INTERNAL MEDICINE

## 2024-11-26 PROCEDURE — 99233 SBSQ HOSP IP/OBS HIGH 50: CPT | Mod: FS | Performed by: PHYSICIAN ASSISTANT

## 2024-11-26 PROCEDURE — 99233 SBSQ HOSP IP/OBS HIGH 50: CPT | Performed by: STUDENT IN AN ORGANIZED HEALTH CARE EDUCATION/TRAINING PROGRAM

## 2024-11-26 PROCEDURE — 120N000001 HC R&B MED SURG/OB

## 2024-11-26 PROCEDURE — 999N000157 HC STATISTIC RCP TIME EA 10 MIN

## 2024-11-26 PROCEDURE — 85730 THROMBOPLASTIN TIME PARTIAL: CPT | Performed by: STUDENT IN AN ORGANIZED HEALTH CARE EDUCATION/TRAINING PROGRAM

## 2024-11-26 PROCEDURE — 250N000009 HC RX 250: Performed by: INTERNAL MEDICINE

## 2024-11-26 PROCEDURE — 250N000013 HC RX MED GY IP 250 OP 250 PS 637: Performed by: PHYSICIAN ASSISTANT

## 2024-11-26 PROCEDURE — 94640 AIRWAY INHALATION TREATMENT: CPT

## 2024-11-26 PROCEDURE — 85027 COMPLETE CBC AUTOMATED: CPT | Performed by: STUDENT IN AN ORGANIZED HEALTH CARE EDUCATION/TRAINING PROGRAM

## 2024-11-26 PROCEDURE — 83735 ASSAY OF MAGNESIUM: CPT | Performed by: STUDENT IN AN ORGANIZED HEALTH CARE EDUCATION/TRAINING PROGRAM

## 2024-11-26 PROCEDURE — 80048 BASIC METABOLIC PNL TOTAL CA: CPT | Performed by: STUDENT IN AN ORGANIZED HEALTH CARE EDUCATION/TRAINING PROGRAM

## 2024-11-26 PROCEDURE — 94640 AIRWAY INHALATION TREATMENT: CPT | Mod: 76

## 2024-11-26 PROCEDURE — 99232 SBSQ HOSP IP/OBS MODERATE 35: CPT | Performed by: INTERNAL MEDICINE

## 2024-11-26 PROCEDURE — 250N000013 HC RX MED GY IP 250 OP 250 PS 637: Performed by: INTERNAL MEDICINE

## 2024-11-26 PROCEDURE — 250N000012 HC RX MED GY IP 250 OP 636 PS 637: Performed by: INTERNAL MEDICINE

## 2024-11-26 PROCEDURE — 99222 1ST HOSP IP/OBS MODERATE 55: CPT | Performed by: INTERNAL MEDICINE

## 2024-11-26 PROCEDURE — 250N000011 HC RX IP 250 OP 636: Performed by: STUDENT IN AN ORGANIZED HEALTH CARE EDUCATION/TRAINING PROGRAM

## 2024-11-26 PROCEDURE — 94799 UNLISTED PULMONARY SVC/PX: CPT

## 2024-11-26 RX ORDER — CHLORTHALIDONE 25 MG/1
25 TABLET ORAL DAILY
Status: DISCONTINUED | OUTPATIENT
Start: 2024-11-26 | End: 2024-12-02 | Stop reason: ALTCHOICE

## 2024-11-26 RX ORDER — CARVEDILOL 25 MG/1
25 TABLET ORAL 2 TIMES DAILY WITH MEALS
Status: DISCONTINUED | OUTPATIENT
Start: 2024-11-26 | End: 2024-11-28

## 2024-11-26 RX ORDER — FLUTICASONE PROPIONATE 50 MCG
1 SPRAY, SUSPENSION (ML) NASAL DAILY
Status: DISCONTINUED | OUTPATIENT
Start: 2024-11-26 | End: 2024-12-13 | Stop reason: HOSPADM

## 2024-11-26 RX ADMIN — ACETAMINOPHEN 1000 MG: 500 TABLET, FILM COATED ORAL at 20:55

## 2024-11-26 RX ADMIN — BUMETANIDE 2 MG: 0.25 INJECTION INTRAMUSCULAR; INTRAVENOUS at 05:19

## 2024-11-26 RX ADMIN — CEFUROXIME AXETIL 250 MG: 250 TABLET ORAL at 08:19

## 2024-11-26 RX ADMIN — AMLODIPINE BESYLATE 10 MG: 10 TABLET ORAL at 08:10

## 2024-11-26 RX ADMIN — IPRATROPIUM BROMIDE AND ALBUTEROL SULFATE 3 ML: .5; 3 SOLUTION RESPIRATORY (INHALATION) at 07:29

## 2024-11-26 RX ADMIN — HYDRALAZINE HYDROCHLORIDE 100 MG: 50 TABLET ORAL at 08:11

## 2024-11-26 RX ADMIN — BUDESONIDE 0.5 MG: 0.5 INHALANT RESPIRATORY (INHALATION) at 07:29

## 2024-11-26 RX ADMIN — CEFUROXIME AXETIL 250 MG: 250 TABLET ORAL at 19:36

## 2024-11-26 RX ADMIN — GABAPENTIN 200 MG: 100 CAPSULE ORAL at 20:55

## 2024-11-26 RX ADMIN — FLUTICASONE PROPIONATE 1 SPRAY: 50 SPRAY, METERED NASAL at 16:32

## 2024-11-26 RX ADMIN — HYDRALAZINE HYDROCHLORIDE 100 MG: 50 TABLET ORAL at 18:20

## 2024-11-26 RX ADMIN — NICOTINE 1 PATCH: 21 PATCH, EXTENDED RELEASE TRANSDERMAL at 08:13

## 2024-11-26 RX ADMIN — CHLORTHALIDONE 25 MG: 25 TABLET ORAL at 16:32

## 2024-11-26 RX ADMIN — PREDNISONE 40 MG: 20 TABLET ORAL at 08:11

## 2024-11-26 RX ADMIN — BUMETANIDE 2 MG: 0.25 INJECTION INTRAMUSCULAR; INTRAVENOUS at 16:35

## 2024-11-26 RX ADMIN — ISOSORBIDE MONONITRATE 120 MG: 60 TABLET, EXTENDED RELEASE ORAL at 08:10

## 2024-11-26 RX ADMIN — IPRATROPIUM BROMIDE AND ALBUTEROL SULFATE 3 ML: .5; 3 SOLUTION RESPIRATORY (INHALATION) at 11:03

## 2024-11-26 RX ADMIN — ROSUVASTATIN CALCIUM 10 MG: 10 TABLET, FILM COATED ORAL at 20:55

## 2024-11-26 RX ADMIN — FLUTICASONE FUROATE AND VILANTEROL TRIFENATATE 1 PUFF: 200; 25 POWDER RESPIRATORY (INHALATION) at 08:13

## 2024-11-26 RX ADMIN — HYDRALAZINE HYDROCHLORIDE 100 MG: 50 TABLET ORAL at 21:00

## 2024-11-26 RX ADMIN — HEPARIN SODIUM 750 UNITS/HR: 10000 INJECTION, SOLUTION INTRAVENOUS at 20:56

## 2024-11-26 RX ADMIN — IPRATROPIUM BROMIDE AND ALBUTEROL SULFATE 3 ML: .5; 3 SOLUTION RESPIRATORY (INHALATION) at 19:51

## 2024-11-26 RX ADMIN — CLOPIDOGREL BISULFATE 75 MG: 75 TABLET ORAL at 08:10

## 2024-11-26 RX ADMIN — METOPROLOL TARTRATE 50 MG: 50 TABLET, FILM COATED ORAL at 08:19

## 2024-11-26 RX ADMIN — HYDRALAZINE HYDROCHLORIDE 100 MG: 50 TABLET ORAL at 12:30

## 2024-11-26 RX ADMIN — HYDRALAZINE HYDROCHLORIDE 10 MG: 20 INJECTION INTRAMUSCULAR; INTRAVENOUS at 03:42

## 2024-11-26 RX ADMIN — CARVEDILOL 25 MG: 25 TABLET, FILM COATED ORAL at 18:19

## 2024-11-26 RX ADMIN — IPRATROPIUM BROMIDE AND ALBUTEROL SULFATE 3 ML: .5; 3 SOLUTION RESPIRATORY (INHALATION) at 15:16

## 2024-11-26 ASSESSMENT — ACTIVITIES OF DAILY LIVING (ADL)
ADLS_ACUITY_SCORE: 49
ADLS_ACUITY_SCORE: 54
ADLS_ACUITY_SCORE: 49
ADLS_ACUITY_SCORE: 54
ADLS_ACUITY_SCORE: 49
ADLS_ACUITY_SCORE: 49
ADLS_ACUITY_SCORE: 54
ADLS_ACUITY_SCORE: 54
ADLS_ACUITY_SCORE: 49
ADLS_ACUITY_SCORE: 49
ADLS_ACUITY_SCORE: 54
ADLS_ACUITY_SCORE: 49
ADLS_ACUITY_SCORE: 54

## 2024-11-26 NOTE — TELEPHONE ENCOUNTER
Spoke with patients sister and she was wondering what is going on with the patient inpatient as she has tried to reach out to rounding doctors and nurses and has not had much luck. Reviewed admission notes with patient and recommended that her sister calls her to listen to the doctors as they come in and round as she is unable to be at the hospital. Pts sister was very appreciative of the time spent reviewing admission notes.

## 2024-11-26 NOTE — PROGRESS NOTES
Renal Medicine Progress Note            Assessment/Plan:     Shirley Hendricks is a 64 year old female who was admitted on 11/20/2024.      Assessment:  1) chronic kidney disease stage IIIb  Followed by [facility] nephrology, Dr. Braboza and DARREL Roberson, last visit 11/14/2024     Noted history LIMA June/July 2024 requiring dialysis, discontinued 6/24     Hyperkalemia last month, spironolactone discontinued with potassium 6.0.  Potassium but normal this admission.      hyponatremia, initially mild but slightly worse today at 128.  This despite reported very little p.o. intake and n.p.o. after midnight status.  Urine sodium yesterday not low although getting IV Bumex.  Low fractional secretion of urea does question of prerenal state.      GFR slightly lower today at 25, declining trend in setting of diuresis.  Agree she has a confusing picture and right heart cath would be very informative if done today.  The absolute lack of any hematuria, pyuria, albuminuria or proteinuria suggest no active glomerular disease.     2) hypervolemic state, history of HFpEF: Current regimen include 2 mg IV every 8 hours Bumex, received 2.5 mg metolazone 11/24.  No significant edema on exam, chest x-ray 2 days ago with pulmonary edema.     3) hypertension: Currently on amlodipine 10 mg, hydralazine 100 mg 3 times daily, Imdur 120 mg daily, metoprolol 50 g twice daily,  Remains hypertensive.        Plan/Recs:  1) continue diuresis until right heart cath is performed, hopefully today  2) antihypertensive regimen being managed currently by cardiology    Sean Loving DO  [facility]  Office: [phone]  Cell: [phone]        Interval History:     Patient remains frustrated with the need for oxygen support.  N.p.o. after midnight for planned right heart cath today by cardiology.    Weight yesterday stable at 45.2 kg, urine output 1.65 L.    Blood pressure 160-180 systolic.          Medications and Allergies:     Current  Facility-Administered Medications   Medication Dose Route Frequency Provider Last Rate Last Admin    amLODIPine (NORVASC) tablet 10 mg  10 mg Oral Daily Marcin White MD   10 mg at 11/26/24 0810    [Held by provider] apixaban ANTICOAGULANT (ELIQUIS) tablet 2.5 mg  2.5 mg Oral BID Yoli Huizar MD   2.5 mg at 11/24/24 1012    budesonide (PULMICORT) neb solution 0.5 mg  0.5 mg Nebulization BID Eli Peraza MD   0.5 mg at 11/26/24 0729    bumetanide (BUMEX) injection 2 mg  2 mg Intravenous Q8H Fish Chacko MD   2 mg at 11/26/24 0519    cefuroxime (CEFTIN) tablet 250 mg  250 mg Oral Q12H [facility] (08/20) Eli Peraza MD   250 mg at 11/26/24 0819    clopidogrel (PLAVIX) tablet 75 mg  75 mg Oral Daily Marcin White MD   75 mg at 11/26/24 0810    fluticasone-vilanterol (BREO ELLIPTA) 200-25 MCG/ACT inhaler 1 puff  1 puff Inhalation Daily Marcin White MD   1 puff at 11/26/24 0813    gabapentin (NEURONTIN) capsule 200 mg  200 mg Oral At Bedtime Marcin White MD   200 mg at 11/25/24 2036    heparin - BOLUS DOSE from infusion  60 Units/kg Intravenous Once Yoli Huizar MD        hydrALAZINE (APRESOLINE) tablet 100 mg  100 mg Oral 4x Daily Lisa Zeng PA-C   100 mg at 11/26/24 0811    ipratropium - albuterol 0.5 mg/2.5 mg/3 mL (DUONEB) neb solution 3 mL  3 mL Nebulization 4x daily Eli Peraza MD   3 mL at 11/26/24 1103    isosorbide mononitrate (IMDUR) 24 hr tablet 120 mg  120 mg Oral Daily Lisa Zeng PA-C   120 mg at 11/26/24 0810    metoprolol tartrate (LOPRESSOR) tablet 50 mg  50 mg Oral BID Yoli Huizar MD   50 mg at 11/26/24 0819    nicotine (NICODERM CQ) 21 MG/24HR 24 hr patch 1 patch  1 patch Transdermal Daily Carlos Hunter MD   1 patch at 11/26/24 0813    Followed by    [START ON 1/2/2025] nicotine (NICODERM CQ) 14 MG/24HR 24 hr patch 1 patch  1 patch Transdermal Daily Carlos Hunter MD        Followed by    [START ON 1/30/2025] nicotine (NICODERM CQ) 7  "MG/24HR 24 hr patch 1 patch  1 patch Transdermal Daily Carlos Hunter MD        predniSONE (DELTASONE) tablet 40 mg  40 mg Oral Daily Eli Peraza MD   40 mg at 11/26/24 0811    rosuvastatin (CRESTOR) tablet 10 mg  10 mg Oral At Bedtime Marcin White MD   10 mg at 11/25/24 2036    sodium chloride (PF) 0.9% PF flush 3 mL  3 mL Intracatheter Q8H Marcin White MD   3 mL at 11/26/24 0342        Allergies   Allergen Reactions    Contrast Dye Anaphylaxis     Pt reported facial and throat swelling with prior CT contrast    Pantoprazole      Protonix caused diffuse edema    Chantix [Varenicline]      Terrible dreams    Gluten Meal GI Disturbance     Pt has celiac disease    Penicillins Itching and Rash            Physical Exam:   Vitals were reviewed  BP (!) 169/76 (BP Location: Right arm)   Pulse 56   Temp 97.8  F (36.6  C) (Oral)   Resp 16   Ht 1.575 m (5' 2\")   Wt 45.2 kg (99 lb 10.4 oz)   LMP  (LMP Unknown)   SpO2 96%   BMI 18.23 kg/m      Wt Readings from Last 3 Encounters:   11/26/24 45.2 kg (99 lb 10.4 oz)   09/23/24 44.8 kg (98 lb 12.8 oz)   08/15/24 47.2 kg (104 lb)       Intake/Output Summary (Last 24 hours) at 11/26/2024 1148  Last data filed at 11/26/2024 0700  Gross per 24 hour   Intake --   Output 2250 ml   Net -2250 ml       GENERAL: Frail, appears older than stated age  HEENT:  Normocephalic. No gross abnormalities  CV: RRR, 1/6 systolic murmur, no dependent edema or in left lower extremity  RESP: Clear bilaterally with good efforts  GI: Abdomen soft/nt/nd, BS normal.   MUSCULOSKELETAL: Right leg amputation  SKIN: no suspicious lesions or rashes, dry to touch  PSYCH: mood good, affect appropriate              Data:     BMP  Recent Labs   Lab 11/26/24  0312 11/25/24  0535 11/24/24  0828 11/23/24  1035   * 132* 131* 130*   POTASSIUM 4.6 4.5 4.6 4.3   CHLORIDE 92* 96* 97* 94*   SONIA 8.1* 8.6* 8.3* 8.4*   CO2 23 23 21* 20*   BUN 94.3* 85.1* 83.2* 74.6*   CR 2.14* 2.00* 1.87* 1.89*   * 90 " 128* 112*     CBC  Recent Labs   Lab 11/26/24  0312 11/25/24  0535 11/24/24  0828 11/23/24  1035   WBC 7.3 8.1 9.0 9.6   HGB 10.8* 11.4* 11.2* 11.5*   HCT 33.2* 35.6 35.1 36.3   MCV 83 83 84 84    277 308 313     Lab Results   Component Value Date    AST 16 11/21/2024    ALT 15 11/21/2024    ALKPHOS 66 11/21/2024    BILITOTAL 0.2 11/21/2024    BILICONJ 0.0 01/23/2004     Lab Results   Component Value Date    INR 1.31 (H) 04/11/2024       Attestation:  I have reviewed today's vital signs, notes, medications, labs and imaging.    DO [facility] - Nephrology  Office: [phone]  Cell: [phone]

## 2024-11-26 NOTE — PLAN OF CARE
Goal Outcome Evaluation:    Pt remains on HFNC 90% 55 LPM, Good UOP, Denies pain, Tele SB/SR, BP Better controlled, Possible RHC tomorrow. Heparin gtt 850 units/hr

## 2024-11-26 NOTE — PLAN OF CARE
A&O X4..VSS on 40L 70% HFNC, except HTN, occasionally christel.TELE SR/SB.Lung Sounds coarse.Bowel Sounds active, -BM.Voiding adequately, external cath in place. Blanchable redness in coccyx. Up assist 2 lift, pt frequently shift self in bed. Pt denies pain. Tolerating 2g NA 1800 ml FR plan for NPO midnight for heart cath in am. Heparin drip paused, restart at 1930, next hep10a at 0120.

## 2024-11-26 NOTE — PROGRESS NOTES
[facility]  Cardiology Progress Note  Date of Service: 11/26/2024  Primary Cardiologist: Dr Mason    Assessment & Plan    Shirley Hendricks is a 64 year old female with complicated past medical history including COPD, hypertension, CHF, peripheral arterial disease, coronary artery disease and stage IV CKD admitted on 11/20/2024 with shortness of breath    Assessment and Plan:  Acute hypoxic respiratory failure, thought to be multifactorial  Heart failure with preserved ejection fraction  Acute decompensated heart failure with preserved ejection fraction  Pleural effusions  Hypertensive urgency/emergency- Bps still markedly above goal   Mild mitral regurgitation  Mild tricuspid regurgitation  Right heart cath tomorrow  Bumex 2 mg every 8 hours, appreciate nephrology's input  Hydralazine 100 mg 4 times daily  Imdur 120 mg daily  Stop Metoprolol tartrate 50 mg twice daily, start carvedilol 25 mg BID  Chlorthalidone 25 mg daily  Basic metabolic panel in the morning  Continue amlodipine 10 mg daily  Atrial fibrillation with RVR, resolved heart rates now well-controlled based on observation of telemetry today.  Apixaban 2.5 mg twice daily held in anticipation of upcoming right heart cath; bridged with heparin given history  Continue metoprolol tartrate  Coronary artery disease,  10/2023 angiogram with  of the D1 with collaterals from distal LAD, elsewhere moderate disease  Continue rosuvastatin 10 mg daily  PAD, status post BKA April 2024.  She is status post aortofemoral bypass and left femoropopliteal bypass.  She has right femoral graft limb to above-the-knee popliteal bypass in 2020.  Status post right femoral to anterior tibial PTFE bypass graft with single-vessel runoff in October 2011 followed by embolectomy to her graft in October 2013  Continue clopidogrel  History of malignant B-cell lymphoma  CKD stage III, history of requiring temporary dialysis June and July 2024  Avoid  spironolactone given history of hyperkalemia  Nephrology following.    Lisa Zeng PA-C  [facility]  Pager: through Vocera    Interval History   Remains on high flow nasal cannula 90% 55 L.n patient reports he is finally starting to feel somewhat better today.  Denies chest pain or pressure.  Still feels short of breath but much improved.  No dizziness or lightheadedness.  No palpitations or racing heart.  Leg swelling has resolved.      Data   Last 24 hours diagnostics reviewed:    November 21, 2024 echocardiogram :  The left ventricle is normal in size.  Left ventricular systolic function is normal.  The visual ejection fraction is 55-60%.  Normal left ventricular wall motion  The right ventricle is normal in structure, function and size.  There is mild (1+) mitral regurgitation.  There is mild (1+) tricuspid regurgitation.  Right ventricular systolic pressure is elevated, consistent with mild  pulmonary hypertension.  Compared to prior study, there is no significant change.    Chest x-ray November 2024:  MPRESSION: Heart size is enlarged and there is moderate bilateral pulmonary edema with small bilateral pleural effusions. Findings are compatible with congestive heart failure. Overlying infectious process involving the lung bases is not excluded     Telemetry:   Heart rates well-controlled      Physical Exam   Temp: 97.7  F (36.5  C) Temp src: Oral BP: (!) 173/71 Pulse: 65   Resp: 20 SpO2: 96 % O2 Device: High Flow Nasal Cannula (HFNC) Oxygen Delivery: 45 LPM  Vitals:    11/24/24 0546 11/25/24 0607 11/26/24 0325   Weight: 45.2 kg (99 lb 10.4 oz) 43.2 kg (95 lb 3.8 oz) 45.2 kg (99 lb 10.4 oz)       GEN: Awake and alert, wearing oxygen  HEART: No tachycardia  LUNGS: Breath sounds diminished   EXT: no swelling, right BKA    Medications   Current Facility-Administered Medications   Medication Dose Route Frequency Provider Last Rate Last Admin    heparin 25,000 units in 0.45% NaCl 250 mL ANTICOAGULANT infusion   0-5,000 Units/hr Intravenous Continuous Fish Chacko MD 9.5 mL/hr at 11/26/24 1157 950 Units/hr at 11/26/24 1157    No lozenges or gum should be given while patient on BIPAP/AVAPS/AVAPS AE   Does not apply Continuous PRN Marcin White MD        Patient may continue current oral medications   Does not apply Continuous PRN Marcin White MD         Current Facility-Administered Medications   Medication Dose Route Frequency Provider Last Rate Last Admin    amLODIPine (NORVASC) tablet 10 mg  10 mg Oral Daily Marcin White MD   10 mg at 11/26/24 0810    [Held by provider] apixaban ANTICOAGULANT (ELIQUIS) tablet 2.5 mg  2.5 mg Oral BID Yoli Huizar MD   2.5 mg at 11/24/24 1012    bumetanide (BUMEX) injection 2 mg  2 mg Intravenous Q8H Fish Chacko MD   2 mg at 11/26/24 0519    carvedilol (COREG) tablet 25 mg  25 mg Oral BID w/meals Lisa Zeng PA-C        cefuroxime (CEFTIN) tablet 250 mg  250 mg Oral Q12H [facility] (08/20) Eli Peraza MD   250 mg at 11/26/24 0819    chlorthalidone (HYGROTON) tablet 25 mg  25 mg Oral Daily Lisa Zeng PA-C        clopidogrel (PLAVIX) tablet 75 mg  75 mg Oral Daily Marcin White MD   75 mg at 11/26/24 0810    fluticasone (FLONASE) 50 MCG/ACT spray 1 spray  1 spray Both Nostrils Daily Liane Huertas MD        fluticasone-vilanterol (BREO ELLIPTA) 200-25 MCG/ACT inhaler 1 puff  1 puff Inhalation Daily Marcin White MD   1 puff at 11/26/24 0813    gabapentin (NEURONTIN) capsule 200 mg  200 mg Oral At Bedtime Marcin White MD   200 mg at 11/25/24 2036    hydrALAZINE (APRESOLINE) tablet 100 mg  100 mg Oral 4x Daily Lisa Zeng PA-C   100 mg at 11/26/24 1230    ipratropium - albuterol 0.5 mg/2.5 mg/3 mL (DUONEB) neb solution 3 mL  3 mL Nebulization 4x daily Eli Peraza MD   3 mL at 11/26/24 1516    isosorbide mononitrate (IMDUR) 24 hr tablet 120 mg  120 mg Oral Daily Lisa Zeng PA-C   120 mg at 11/26/24 0810    nicotine (NICODERM CQ) 21 MG/24HR 24 hr patch 1  patch  1 patch Transdermal Daily Carlos Hunter MD   1 patch at 11/26/24 0813    Followed by    [START ON 1/2/2025] nicotine (NICODERM CQ) 14 MG/24HR 24 hr patch 1 patch  1 patch Transdermal Daily Carlos Hunter MD        Followed by    [START ON 1/30/2025] nicotine (NICODERM CQ) 7 MG/24HR 24 hr patch 1 patch  1 patch Transdermal Daily Carlos Hunter MD        predniSONE (DELTASONE) tablet 40 mg  40 mg Oral Daily Eli Peraza MD   40 mg at 11/26/24 0811    rosuvastatin (CRESTOR) tablet 10 mg  10 mg Oral At Bedtime Marcin White MD   10 mg at 11/25/24 2036    sodium chloride (PF) 0.9% PF flush 3 mL  3 mL Intracatheter Q8H Marcni White MD   3 mL at 11/26/24 0342

## 2024-11-26 NOTE — CONSULTS
[facility]   Pulmonary   Consult Note 11/26/2024  Shirley Hendricks MRN: [id number]      Assessment & Plan    Shirley Hendricks is a 64 year old female with medical history significant for COPD, hypertension, CHF, peripheral arterial disease (on Plavix), CKD stage IV, CAD who was admitted on 11/20/2024 with 2 days of worsening shortness of breath and cough.  Despite diuresis and bilateral thoracentesis (transudative fluid) she remains hypoxic. CT chest performed 11/21 shows moderate right pleural effusion with adjacent atelectasis as well as new patchy consolidative opacities within the right middle and lower lobes, moderate left sided pleural effusion, interstitial pulmonary edema and prominent lymph nodes.  Trace pericardial effusion was also noted.  At this time I agree with ongoing diuresis as tolerated with plan for right heart cath especially to assess volume status.  There is concern for pulmonary hypertension given chronic lung disease, and chronic heart disease.  CT findings could also be consistent with lobar pneumonia but she has received nearly a week of antibiotics (azithromycin, ceftriaxone, cefuroxime) without evidence of ongoing leukocytosis or fever.    Recommendations:   - sputum culture (ordered)  - Discontinue budesonide nebulization; continue Breo Ellipta  - continue DuoNebs 4 times daily; add aerobika device 4 times daily  - Agree with prednisone 40 mg daily x 5 days  - RHC / diuresis per cards / primary team   - fluticasone nasal spray every day ordered   - nicotine patch prn   - wean supplemental O2 to maintain SpO2 >/= 92%     Thank you for involving us in the care of this interesting patient. Please feel free to reach out with any questions or concerns.       Liane Huertas MD  Pulmonary and Critical Care Medicine   11/26/2024    This note was created using dictation software and may contain errors.  Please contact the creator for any clarifications that are  needed.            History of Present Illness:   Shirley Hendricks is a 64 year old female with medical history significant for COPD, hypertension, CHF, peripheral arterial disease (on Plavix), CKD stage IV, CAD who was admitted on 11/20/2024 with 2 days of worsening shortness of breath and cough.  On arrival she was hypoxic with SpO2 in the 80s on room air.  She denies recent fevers, chills, night sweats, sick contacts.  She has been diuresed and is currently net -1.5 L since admission but remains on 80% FiO2 and 45 L/min high flow nasal cannula.  Cardiology has been following with plan to obtain right heart cath today but this was postponed due to ongoing hypertension with SBP's in the 180s.  She has undergone left sided (11/20) and right sided (11/22) thoracenteses with transudative fluid removed each time.  She reports there each time there was some improvement in her breathing.  TTE did show mitral regurgitation, tricuspid regurg and elevated PA pressures.  She has been initiated on prednisone 40 mg daily x 5 days along with scheduled DuoNebs and Breo Ellipta for COPD exacerbation.    She denies regular cough at home.  Currently feels that she has phlegm stuck under throat that she is unable to clear up.  Also with some postnasal drainage.  She typically uses Breo Ellipta at home with intermittent albuterol as needed.  She does continue to smoke less than half pack per day and is interested in quitting.  She denies any exposures at home and typically is on room air.          Review of Symptoms:   10-point ROS reviewed, & found negative w/ exceptions noted in the HPI.          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 clot    Post-menopausal     Reflux esophagitis 02/2004    EGD: esophagitis and medium HH    SBO (small bowel obstruction) (H) 08/10/2021    SVT (supraventricular tachycardia) (H)     Thrombocytopenia (H)     Uncomplicated asthma     Vitamin C deficiency 08/12/2018       Past Surgical History:   Procedure Laterality Date    AMPUTATE LEG ABOVE KNEE N/A 4/1/2024    Procedure: AMPUTATION, ABOVE KNEE right leg with wound vac dressing, excision of anteriotibial bypass graft, closure of (previous interventional radiology) left groin incision;  Surgeon: José Luis Hernandez MD;  Location:  OR    AMPUTATE LEG ABOVE KNEE Right 4/9/2024    Procedure: COMPLETION RIGHT ABOVE KNEE AMPUTATION;  Surgeon: José Luis Hernandez MD;  Location:  OR    ANGIOGRAM      ANGIOGRAM Right 6/23/2021    Procedure: RIGHT LOWER EXTREMITY ANGIOGRAM WITH LEFT BRACHIAL ARTERY CUTDOWN;  Surgeon: José Luis Hernandez MD;  Location:  OR    APPLY WOUND VAC Right 5/16/2024    Procedure: PLACEMENT OF WOUND VAC TO RIGHT ABOVE KNEE AMPUTATION;  Surgeon: Tay Enciso MD;  Location:  OR    APPLY WOUND VAC N/A 5/20/2024    Procedure: AND PLACEMENT OF WOUND VAC;  Surgeon: José Luis Hernandez MD;  Location:  OR    BIOPSY MASS NECK Right 10/11/2023    Procedure: Right Parotid Biopsy;  Surgeon: Randal Mendoza MD;  Location:  OR    BRONCHOSCOPY FLEXIBLE AND RIGID N/A 6/26/2024    Procedure: Bronchoscopy flexible and rigid;  Surgeon:  Rod Nath MD;  Location:  GI    BYPASS GRAFT FEMOROPOPLITEAL Right 09/23/2020    Procedure: RIGHT FEMORAL GRAFT TO ABOVE-KNEE POPLITEAL BYPASS USING CADAVERIC SUPERFICIAL FEMORAL ARTERY;  Surgeon: Chadwick Lozano MD;  Location:  OR    BYPASS GRAFT FEMOROPOPLITEAL Right 2/15/2022    Procedure: RIGHT SUPERFICIAL FEMORAL ARTERY GRAFT TO BELOW KNEE POPLITEAL BYPASS WITH CADAVERIC CRYOLIFE  FEMORAL-POPLITEAL ARTERY SIZE: OUTER DIAMETER: 7MM - 6MM;  Surgeon: Chadwick Lozano;  Location:  OR    BYPASS GRAFT FEMOROPOPLITEAL Right 5/26/2023    Procedure: RIGHT DISTAL SUPERFICIAL FEMORAL GRAFT TO ANTERIOR TIBIAL ARTERY WITH 26 CENTIMETER CADAVERIC SUPERFICIAL FEMORAL ARTERY GRAFT;  Surgeon: Chadwick Lozano MD;  Location:  OR    BYPASS GRAFT FEMOROPOPLITEAL Right 10/11/2023    Procedure: RIGHT FEMORAL TO POPLITEAL GRAFT THROMBECTOMY;  Surgeon: Chadwick Lozano MD;  Location:  OR    BYPASS GRAFT INSITU FEMOROPOPLITEAL Right 7/7/2021    Procedure: CREATION RIGHT FEMORAL TO POPLITEAL ARTERIAL BYPASS USING INSITU VEIN GRAFT;  Surgeon: José Luis Hernandez MD;  Location:  OR    CARDIAC CATHERIZATION  09/03/2009    multivessel CAD without target lesions, med mgmt indicated, preserved ef    CARPAL TUNNEL RELEASE RT/LT Right 05/20/2021    COLONOSCOPY N/A 12/12/2023    Procedure: Colonoscopy;  Surgeon: Corey Hoffman MD;  Location:  GI    COLONOSCOPY N/A 12/14/2023    Procedure: Colonoscopy;  Surgeon: Corey Hoffman MD;  Location:  GI    CV CORONARY ANGIOGRAM N/A 10/4/2023    Procedure: Coronary Angiogram;  Surgeon: Rogelio Ricks MD;  Location:  HEART CARDIAC CATH LAB    CV PCI N/A 10/4/2023    Procedure: Percutaneous Coronary Intervention;  Surgeon: Rogelio Ricks MD;  Location:  HEART CARDIAC CATH LAB    EMBOLECTOMY LOWER EXTREMITY Right 10/6/2023    Procedure: Right anterior tibial bypass with graft, Right tibial endarterectomy,thrombectomy,  Right doraslis pedis thrombectomy, Anterior Tibial vein patch.;  Surgeon: Chadwick Lozano MD;  Location:  OR    ENDARTERECTOMY CAROTID Right 05/11/2016    Procedure: ENDARTERECTOMY CAROTID;  Surgeon: Chadwick Lozano MD;  Location:  OR    ENDARTERECTOMY CAROTID Left 06/08/2020    Procedure: LEFT CAROTID ENDARTERECTOMY with distal facal vein patch  and EEG;  Surgeon: Chadwick Lozano MD;  Location:  OR    ESOPHAGOSCOPY, GASTROSCOPY, DUODENOSCOPY (EGD), COMBINED N/A 12/12/2023    Procedure: Esophagoscopy, gastroscopy, duodenoscopy (EGD), combined;  Surgeon: Corey Hoffman MD;  Location:  GI    FINE NEEDLE ASPIRATION WITHOUT IMAGING GUIDANCE Right 9/22/2023    Procedure: Fine needle aspiration without imaging guidance;  Surgeon: Kiersten Aguilera MD;  Location:  GI    FLUORESCENCE INTRAOPERATIVE VASCULAR ANGIOGRAPHY Right 12/28/2022    Procedure: RIGHT LEG ANGIOGRAM with intervention;  Surgeon: Chadwick Lozano MD;  Location:  OR    GYN SURGERY  left tube    left salpingectomy    IR ANGIOGRAM THROUGH CATHETER (ARTERIAL)  10/6/2023    IR ANGIOGRAM THROUGH CATHETER (ARTERIAL)  10/6/2023    IR ANGIOGRAM THROUGH CATHETER (ARTERIAL)  3/31/2024    IR ANGIOGRAM THROUGH CATHETER (ARTERIAL)  3/30/2024    IR CHEST TUBE PLACEMENT NON-TUNNELLED LEFT  6/23/2024    IR CVC TUNNEL PLACEMENT > 5 YRS OF AGE  4/3/2024    IR CVC TUNNEL PLACEMENT > 5 YRS OF AGE  6/23/2024    IR CVC TUNNEL REMOVAL RIGHT  5/28/2024    IR CVC TUNNEL REMOVAL RIGHT  7/3/2024    IR CVC TUNNEL REVISION RIGHT  4/15/2024    IR LOWER EXTREMITY ANGIOGRAM RIGHT  05/25/2021    IR LOWER EXTREMITY ANGIOGRAM RIGHT  10/5/2023    IR LOWER EXTREMITY ANGIOGRAM RIGHT  3/29/2024    IR OR ANGIOGRAM  6/23/2021    IR OR ANGIOGRAM  12/28/2022    IRRIGATION AND DEBRIDEMENT LOWER EXTREMITY, COMBINED Right 5/16/2024    Procedure: IRRIGATION AND DEBRIDEMENT OF RIGHT ABOVE KNEE AMPUTATION;  Surgeon: Tay Enciso MD;  Location:   OR    IRRIGATION AND DEBRIDEMENT LOWER EXTREMITY, COMBINED Right 5/20/2024    Procedure: IRRIGATION AND DEBRIDMENT RIGHT LOWER EXTREMITY;  Surgeon: José Luis Hernandez MD;  Location:  OR    LAPAROSCOPIC CHOLECYSTECTOMY N/A 09/27/2017    Procedure: LAPAROSCOPIC CHOLECYSTECTOMY;  LAPAROSCOPIC CHOLECYSTECTOMY;  Surgeon: Jacoby Tapia MD;  Location:  SD    LAPAROSCOPY DIAGNOSTIC (GENERAL) N/A 8/11/2021    Procedure: Exploratory laparoscopy,  laparoscopic lysis of adhesions, laparotomy;  Surgeon: Corina Ferreira MD;  Location:  OR    OR ANGIOGRAM, LOWER EXTREMITY Right 10/11/2023    Procedure: Or Angiogram, Lower Extremity;  Surgeon: Chadwick Lozano MD;  Location:  OR    ORTHOPEDIC SURGERY      left knee surgery    REPAIR ANEURYSM FEMORAL ARTERY Left 12/28/2022    Procedure: REPAIR LEFT FEMORAL PSEUDOANEURYSM;  Surgeon: Chadwick Lozano MD;  Location:  OR    VASCULAR SURGERY  aoto bi fem  left fem-AK pop    [facility] FABRIC WRAPPING OF ABDOMINAL ANEURYSM      [facility] NONSPECIFIC PROCEDURE  12/2000    angioplasty with stent right fem. a.    [facility] NONSPECIFIC PROCEDURE  1987    sinus surgery    [facility] NONSPECIFIC PROCEDURE  09/02/2009    Emergent left groin exploration with oversewing of bleeding angiographic site. 2. Endarterectomy of common femoral-proximal superficial femoral artery with greater saphenous vein patch angioplasty.   a. [place] of accessory right greater saphenous vein.     [facility] NONSPECIFIC PROCEDURE  08/27/2009    occluded left common iliac and external iliac arteries were successfully revascularized with stenting to 8 and 7 mm             Allergies:     Allergies   Allergen Reactions    Contrast Dye Anaphylaxis     Pt reported facial and throat swelling with prior CT contrast    Pantoprazole      Protonix caused diffuse edema    Chantix [Varenicline]      Terrible dreams    Gluten Meal GI Disturbance     Pt has celiac disease    Penicillins Itching and Rash             Outpatient  Medications:     Current Facility-Administered Medications   Medication Dose Route Frequency Provider Last Rate Last Admin    acetaminophen (TYLENOL) tablet 1,000 mg  1,000 mg Oral Q6H PRN Marcin White MD   1,000 mg at 11/25/24 2212    albuterol (PROVENTIL HFA/VENTOLIN HFA) inhaler  2 puff Inhalation Q4H PRN Marcin White MD        amLODIPine (NORVASC) tablet 10 mg  10 mg Oral Daily Marcin White MD   10 mg at 11/26/24 0810    [Held by provider] apixaban ANTICOAGULANT (ELIQUIS) tablet 2.5 mg  2.5 mg Oral BID Yoli Huizar MD   2.5 mg at 11/24/24 1012    budesonide (PULMICORT) neb solution 0.5 mg  0.5 mg Nebulization BID Eli Peraza MD   0.5 mg at 11/26/24 0729    bumetanide (BUMEX) injection 2 mg  2 mg Intravenous Q8H Fish Chacko MD   2 mg at 11/26/24 0519    calcium carbonate (TUMS) chewable tablet 1,000 mg  1,000 mg Oral 4x Daily PRN Marcin White MD        carboxymethylcellulose PF (REFRESH PLUS) 0.5 % ophthalmic solution 1 drop  1 drop Both Eyes Q1H PRN Marcin White MD        cefuroxime (CEFTIN) tablet 250 mg  250 mg Oral Q12H [facility] (08/20) Eli Peraza MD   250 mg at 11/26/24 0819    clopidogrel (PLAVIX) tablet 75 mg  75 mg Oral Daily Marcin White MD   75 mg at 11/26/24 0810    diphenhydrAMINE-zinc acetate (BENADRYL) 2-0.1 % cream   Topical TID PRN Carlos Hunter MD        fluticasone (FLONASE) 50 MCG/ACT spray 1 spray  1 spray Both Nostrils Daily Liane Huertas MD        fluticasone-vilanterol (BREO ELLIPTA) 200-25 MCG/ACT inhaler 1 puff  1 puff Inhalation Daily Marcin White MD   1 puff at 11/26/24 0813    gabapentin (NEURONTIN) capsule 200 mg  200 mg Oral At Bedtime Marcin White MD   200 mg at 11/25/24 2036    heparin 25,000 units in 0.45% NaCl 250 mL ANTICOAGULANT infusion  0-5,000 Units/hr Intravenous Continuous Fish Chacko MD 9.5 mL/hr at 11/26/24 1157 950 Units/hr at 11/26/24 1157    hydrALAZINE (APRESOLINE) injection 10 mg  10 mg Intravenous Q4H PRN Carlos Hunter MD   10  mg at 11/26/24 0342    hydrALAZINE (APRESOLINE) tablet 100 mg  100 mg Oral 4x Daily Lisa Zeng PA-C   100 mg at 11/26/24 1230    ipratropium - albuterol 0.5 mg/2.5 mg/3 mL (DUONEB) neb solution 3 mL  3 mL Nebulization 4x daily Eli Peraza MD   3 mL at 11/26/24 1516    isosorbide mononitrate (IMDUR) 24 hr tablet 120 mg  120 mg Oral Daily Lisa Zeng PA-C   120 mg at 11/26/24 0810    levalbuterol (XOPENEX) neb solution 1.25 mg  1.25 mg Nebulization Q6H PRN Yoli Huizar MD   1.25 mg at 11/24/24 1301    lidocaine (LMX4) cream   Topical Q1H PRN Marcin White MD        lidocaine 1 % 0.1-1 mL  0.1-1 mL Other Q1H PRN Marcin White MD        metoprolol tartrate (LOPRESSOR) tablet 50 mg  50 mg Oral BID Yoli Huizar MD   50 mg at 11/26/24 0819    nicotine (NICODERM CQ) 21 MG/24HR 24 hr patch 1 patch  1 patch Transdermal Daily Carlos Hunter MD   1 patch at 11/26/24 0813    Followed by    [START ON 1/2/2025] nicotine (NICODERM CQ) 14 MG/24HR 24 hr patch 1 patch  1 patch Transdermal Daily Carlos Hunter MD        Followed by    [START ON 1/30/2025] nicotine (NICODERM CQ) 7 MG/24HR 24 hr patch 1 patch  1 patch Transdermal Daily Carlos Hunter MD        nitroGLYcerin (NITROSTAT) sublingual tablet 0.4 mg  0.4 mg Sublingual Q5 Min PRN Marcin White MD   0.4 mg at 11/20/24 2119    No lozenges or gum should be given while patient on BIPAP/AVAPS/AVAPS AE   Does not apply Continuous PRN Marcin White MD        Patient may continue current oral medications   Does not apply Continuous PRN Marcin White MD        predniSONE (DELTASONE) tablet 40 mg  40 mg Oral Daily Eli Peraza MD   40 mg at 11/26/24 0811    prochlorperazine (COMPAZINE) injection 5 mg  5 mg Intravenous Q6H PRN Marcin White MD        Or    prochlorperazine (COMPAZINE) tablet 5 mg  5 mg Oral Q6H PRN Marcin White MD        rosuvastatin (CRESTOR) tablet 10 mg  10 mg Oral At Bedtime Marcin White MD   10 mg at 11/25/24 2036  "   senna-docusate (SENOKOT-S/PERICOLACE) 8.6-50 MG per tablet 1 tablet  1 tablet Oral BID PRN Marcin White MD        Or    senna-docusate (SENOKOT-S/PERICOLACE) 8.6-50 MG per tablet 2 tablet  2 tablet Oral BID PRN Marcin White MD        sodium chloride (OCEAN) 0.65 % nasal spray 1 spray  1 spray Both Nostrils BID PRN Liane Huertas MD        sodium chloride (PF) 0.9% PF flush 3 mL  3 mL Intracatheter Q8H Marcin White MD   3 mL at 11/26/24 0342    sodium chloride (PF) 0.9% PF flush 3 mL  3 mL Intracatheter q1 min prn Marcin White MD                 Family History:     Family History   Problem Relation Age of Onset    Cancer Mother         bladder    Cardiovascular Father         alive,multiple heart attacks    Diabetes Maternal Grandmother     Lung Cancer Maternal Grandmother     Blood Disease Brother         clotting disorder               Social History:     Social History     Tobacco Use    Smoking status: Former     Current packs/day: 0.25     Average packs/day: 0.3 packs/day for 56.9 years (14.2 ttl pk-yrs)     Types: Cigarettes     Start date: 1968    Smokeless tobacco: Never    Tobacco comments:     1/2 PPD. 1-3 cigs a day   Vaping Use    Vaping status: Never Used   Substance Use Topics    Alcohol use: Not Currently     Comment: x1-2 yr    Drug use: Yes     Types: Marijuana     Comment: 2x per day             Physical Exam:   BP (!) 173/71   Pulse 65   Temp 97.7  F (36.5  C) (Oral)   Resp 20   Ht 1.575 m (5' 2\")   Wt 45.2 kg (99 lb 10.4 oz)   LMP  (LMP Unknown)   SpO2 96%   BMI 18.23 kg/m      General: thin female in no acute distress, on HFNC   HENT: external ears without visible abnormalities, no rhinorrhea, no epistaxis  Lungs: fair air flow b/l, no wheezes, rales, rhonchi, diminished in bases b/l  Heart: RRR, holosystolic murmu  Extremities: no pitting edema, R BKA, no clubbing or cyanosis  Skin: no visible rashes, no mottling  Neurologic: moving all 4 extremities spontaneously, awake and " alert          Data:   Labs (all laboratory studies reviewed by me): notable labs in HPI above.    Imaging and other diagnostic testing (all imaging studies reviewed by me)    Chest xray - 11/23/24 -   NDICATION: Hypoxia.  COMPARISON: None.                                       IMPRESSION: Heart size is enlarged and there is moderate bilateral pulmonary edema with small bilateral pleural effusions. Findings are compatible with congestive heart failure. Overlying infectious process involving the lung bases is not excluded.       CT chest w/o contrast - 11/21/24 -   FINDINGS:   LUNGS AND PLEURA: Decreased small to moderate right pleural effusion  with adjacent atelectasis. New patchy consolidative opacity within the  right middle lobe and lower lobe with air bronchograms. Similar  moderate left pleural effusion with complete atelectasis of the left  lower lobe. No pneumothorax. Mild interstitial pulmonary edema.     MEDIASTINUM/AXILLAE: Prominent mediastinal lymph nodes may be  reactive. No thoracic aortic aneurysm. Hypodense thyroid nodules  measuring up to 1.1 cm. Trace pericardial fluid.     CORONARY ARTERY CALCIFICATION: Severe.     UPPER ABDOMEN: Trace perisplenic ascites. Cholecystectomy.     MUSCULOSKELETAL: No aggressive osseous lesion. Remote left rib  fracture.                                                                      IMPRESSION:   1.  Small to moderate right pleural effusion with adjacent  atelectasis. New patchy consolidative opacity within the right middle  lobe and lower lobe concerning for superimposed pneumonia with  possible component of reexpansion edema.  2.  Moderate left pleural effusion with complete atelectasis of the  left lower lobe.  3.  Mild interstitial pulmonary edema.    TTE - 11/22/24 -   Interpretation Summary     The left ventricle is normal in size.  Left ventricular systolic function is normal.  The visual ejection fraction is 55-60%.  Normal left ventricular wall  motion  The right ventricle is normal in structure, function and size.  There is mild (1+) mitral regurgitation.  There is mild (1+) tricuspid regurgitation.  Right ventricular systolic pressure is elevated, consistent with mild  pulmonary hypertension.  Compared to prior study, there is no significant change.

## 2024-11-26 NOTE — PLAN OF CARE
Neuro: A&O x4  Tele/cardiac: SB with peaked T waves  Respiration: HFNC 90% 55L  Activity: A1 pivot transfer to [facility]  Pain: denies  Drips: heparin @ 650 units/hr, PTT recheck at 1100 today  Drains/tubes: none  Skin: scattered bruising  GI/: pure wick with good UOP  Aggression color: green  Isolation: none  Plan: has been NPO since midnight for possible RHC today

## 2024-11-26 NOTE — PLAN OF CARE
Goal Outcome Evaluation:         1900 - 2300  Orientation: AOx4    Vitals/Tele: On highflow NC at 90%, 55L titrated to keep sats >88%. Hypertensive, Keep SBP<180    IV Access/drains: PIV, Heparin gtt at 1150 units/hr, next ptt at 03:00 AM.    Diet: 2 Gm NA. NPO after mid-night    Mobility: Right BKA, weight shifting and pivoting to bedside commode    GI/: Pure-wick, Continent    Wound/Skin: Scattered bruising    Consults: Cardiology, Nephrology    Discharge Plan: Possible R. Heart cath in the AM      See Flow sheets for assessment

## 2024-11-27 LAB
ANION GAP SERPL CALCULATED.3IONS-SCNC: 13 MMOL/L (ref 7–15)
APTT PPP: 43 SECONDS (ref 22–38)
APTT PPP: 82 SECONDS (ref 22–38)
BACTERIA SPT CULT: ABNORMAL
BASE EXCESS BLDV CALC-SCNC: 6 MMOL/L (ref -3–3)
BUN SERPL-MCNC: 86.4 MG/DL (ref 8–23)
CALCIUM SERPL-MCNC: 8.6 MG/DL (ref 8.8–10.4)
CHLORIDE SERPL-SCNC: 92 MMOL/L (ref 98–107)
CREAT SERPL-MCNC: 1.92 MG/DL (ref 0.51–0.95)
EGFRCR SERPLBLD CKD-EPI 2021: 28 ML/MIN/1.73M2
ERYTHROCYTE [DISTWIDTH] IN BLOOD BY AUTOMATED COUNT: 14.5 % (ref 10–15)
GLUCOSE SERPL-MCNC: 93 MG/DL (ref 70–99)
GRAM STAIN RESULT: ABNORMAL
HCO3 BLDV-SCNC: 30 MMOL/L (ref 21–28)
HCO3 SERPL-SCNC: 28 MMOL/L (ref 22–29)
HCT VFR BLD AUTO: 34.6 % (ref 35–47)
HGB BLD-MCNC: 11.2 G/DL (ref 11.7–15.7)
LACTATE BLD-SCNC: <0.3 MMOL/L
LACTATE SERPL-SCNC: 0.7 MMOL/L (ref 0.7–2)
MAGNESIUM SERPL-MCNC: 2.4 MG/DL (ref 1.7–2.3)
MCH RBC QN AUTO: 26.7 PG (ref 26.5–33)
MCHC RBC AUTO-ENTMCNC: 32.4 G/DL (ref 31.5–36.5)
MCV RBC AUTO: 83 FL (ref 78–100)
PCO2 BLDV: 42 MM HG (ref 40–50)
PH BLDV: 7.46 [PH] (ref 7.32–7.43)
PLATELET # BLD AUTO: 278 10E3/UL (ref 150–450)
PO2 BLDV: 37 MM HG (ref 25–47)
POTASSIUM SERPL-SCNC: 4.1 MMOL/L (ref 3.4–5.3)
RBC # BLD AUTO: 4.19 10E6/UL (ref 3.8–5.2)
SAO2 % BLDV: 74 % (ref 70–75)
SODIUM SERPL-SCNC: 133 MMOL/L (ref 135–145)
WBC # BLD AUTO: 7.6 10E3/UL (ref 4–11)

## 2024-11-27 PROCEDURE — 87205 SMEAR GRAM STAIN: CPT | Performed by: INTERNAL MEDICINE

## 2024-11-27 PROCEDURE — 94640 AIRWAY INHALATION TREATMENT: CPT

## 2024-11-27 PROCEDURE — 99233 SBSQ HOSP IP/OBS HIGH 50: CPT | Performed by: STUDENT IN AN ORGANIZED HEALTH CARE EDUCATION/TRAINING PROGRAM

## 2024-11-27 PROCEDURE — 99233 SBSQ HOSP IP/OBS HIGH 50: CPT | Mod: 25 | Performed by: PHYSICIAN ASSISTANT

## 2024-11-27 PROCEDURE — 99152 MOD SED SAME PHYS/QHP 5/>YRS: CPT | Performed by: INTERNAL MEDICINE

## 2024-11-27 PROCEDURE — 83735 ASSAY OF MAGNESIUM: CPT | Performed by: STUDENT IN AN ORGANIZED HEALTH CARE EDUCATION/TRAINING PROGRAM

## 2024-11-27 PROCEDURE — 93451 RIGHT HEART CATH: CPT | Mod: 26 | Performed by: INTERNAL MEDICINE

## 2024-11-27 PROCEDURE — 120N000001 HC R&B MED SURG/OB

## 2024-11-27 PROCEDURE — C1751 CATH, INF, PER/CENT/MIDLINE: HCPCS | Performed by: INTERNAL MEDICINE

## 2024-11-27 PROCEDURE — 250N000013 HC RX MED GY IP 250 OP 250 PS 637: Performed by: STUDENT IN AN ORGANIZED HEALTH CARE EDUCATION/TRAINING PROGRAM

## 2024-11-27 PROCEDURE — 83605 ASSAY OF LACTIC ACID: CPT | Performed by: STUDENT IN AN ORGANIZED HEALTH CARE EDUCATION/TRAINING PROGRAM

## 2024-11-27 PROCEDURE — 94640 AIRWAY INHALATION TREATMENT: CPT | Mod: 76

## 2024-11-27 PROCEDURE — 250N000011 HC RX IP 250 OP 636: Performed by: INTERNAL MEDICINE

## 2024-11-27 PROCEDURE — 87070 CULTURE OTHR SPECIMN AEROBIC: CPT | Performed by: INTERNAL MEDICINE

## 2024-11-27 PROCEDURE — 272N000001 HC OR GENERAL SUPPLY STERILE: Performed by: INTERNAL MEDICINE

## 2024-11-27 PROCEDURE — 999N000157 HC STATISTIC RCP TIME EA 10 MIN

## 2024-11-27 PROCEDURE — C1894 INTRO/SHEATH, NON-LASER: HCPCS | Performed by: INTERNAL MEDICINE

## 2024-11-27 PROCEDURE — 250N000013 HC RX MED GY IP 250 OP 250 PS 637: Performed by: INTERNAL MEDICINE

## 2024-11-27 PROCEDURE — 250N000009 HC RX 250: Performed by: INTERNAL MEDICINE

## 2024-11-27 PROCEDURE — 85041 AUTOMATED RBC COUNT: CPT | Performed by: STUDENT IN AN ORGANIZED HEALTH CARE EDUCATION/TRAINING PROGRAM

## 2024-11-27 PROCEDURE — 93451 RIGHT HEART CATH: CPT | Performed by: INTERNAL MEDICINE

## 2024-11-27 PROCEDURE — 85014 HEMATOCRIT: CPT | Performed by: STUDENT IN AN ORGANIZED HEALTH CARE EDUCATION/TRAINING PROGRAM

## 2024-11-27 PROCEDURE — 82435 ASSAY OF BLOOD CHLORIDE: CPT | Performed by: STUDENT IN AN ORGANIZED HEALTH CARE EDUCATION/TRAINING PROGRAM

## 2024-11-27 PROCEDURE — 82565 ASSAY OF CREATININE: CPT | Performed by: STUDENT IN AN ORGANIZED HEALTH CARE EDUCATION/TRAINING PROGRAM

## 2024-11-27 PROCEDURE — 36415 COLL VENOUS BLD VENIPUNCTURE: CPT | Performed by: STUDENT IN AN ORGANIZED HEALTH CARE EDUCATION/TRAINING PROGRAM

## 2024-11-27 PROCEDURE — 4A023N6 MEASUREMENT OF CARDIAC SAMPLING AND PRESSURE, RIGHT HEART, PERCUTANEOUS APPROACH: ICD-10-PCS | Performed by: INTERNAL MEDICINE

## 2024-11-27 PROCEDURE — 85730 THROMBOPLASTIN TIME PARTIAL: CPT | Performed by: STUDENT IN AN ORGANIZED HEALTH CARE EDUCATION/TRAINING PROGRAM

## 2024-11-27 PROCEDURE — 250N000011 HC RX IP 250 OP 636: Performed by: STUDENT IN AN ORGANIZED HEALTH CARE EDUCATION/TRAINING PROGRAM

## 2024-11-27 PROCEDURE — 250N000013 HC RX MED GY IP 250 OP 250 PS 637: Performed by: PHYSICIAN ASSISTANT

## 2024-11-27 PROCEDURE — 999N000215 HC STATISTIC HFNC ADULT NON-CPAP

## 2024-11-27 PROCEDURE — 80048 BASIC METABOLIC PNL TOTAL CA: CPT | Performed by: STUDENT IN AN ORGANIZED HEALTH CARE EDUCATION/TRAINING PROGRAM

## 2024-11-27 PROCEDURE — 99232 SBSQ HOSP IP/OBS MODERATE 35: CPT | Performed by: INTERNAL MEDICINE

## 2024-11-27 PROCEDURE — 82803 BLOOD GASES ANY COMBINATION: CPT

## 2024-11-27 PROCEDURE — 250N000012 HC RX MED GY IP 250 OP 636 PS 637: Performed by: INTERNAL MEDICINE

## 2024-11-27 RX ORDER — NALOXONE HYDROCHLORIDE 0.4 MG/ML
0.2 INJECTION, SOLUTION INTRAMUSCULAR; INTRAVENOUS; SUBCUTANEOUS
Status: ACTIVE | OUTPATIENT
Start: 2024-11-27 | End: 2024-11-27

## 2024-11-27 RX ORDER — VALSARTAN 40 MG/1
40 TABLET ORAL DAILY
Status: DISCONTINUED | OUTPATIENT
Start: 2024-11-27 | End: 2024-12-13 | Stop reason: HOSPADM

## 2024-11-27 RX ORDER — FLUMAZENIL 0.1 MG/ML
0.2 INJECTION, SOLUTION INTRAVENOUS
Status: ACTIVE | OUTPATIENT
Start: 2024-11-27 | End: 2024-11-27

## 2024-11-27 RX ORDER — LORAZEPAM 2 MG/ML
0.5 INJECTION INTRAMUSCULAR
Status: CANCELLED | OUTPATIENT
Start: 2024-11-27

## 2024-11-27 RX ORDER — OXYCODONE HYDROCHLORIDE 5 MG/1
5 TABLET ORAL EVERY 4 HOURS PRN
Status: DISCONTINUED | OUTPATIENT
Start: 2024-11-27 | End: 2024-12-13 | Stop reason: HOSPADM

## 2024-11-27 RX ORDER — FENTANYL CITRATE 50 UG/ML
25 INJECTION, SOLUTION INTRAMUSCULAR; INTRAVENOUS
Status: DISCONTINUED | OUTPATIENT
Start: 2024-11-27 | End: 2024-12-04

## 2024-11-27 RX ORDER — POTASSIUM CHLORIDE 1500 MG/1
20 TABLET, EXTENDED RELEASE ORAL
Status: CANCELLED | OUTPATIENT
Start: 2024-11-27

## 2024-11-27 RX ORDER — ASPIRIN 81 MG/1
243 TABLET, CHEWABLE ORAL ONCE
Status: CANCELLED | OUTPATIENT
Start: 2024-11-27

## 2024-11-27 RX ORDER — NALOXONE HYDROCHLORIDE 0.4 MG/ML
0.4 INJECTION, SOLUTION INTRAMUSCULAR; INTRAVENOUS; SUBCUTANEOUS
Status: ACTIVE | OUTPATIENT
Start: 2024-11-27 | End: 2024-11-27

## 2024-11-27 RX ORDER — ATROPINE SULFATE 0.1 MG/ML
0.5 INJECTION INTRAVENOUS
Status: ACTIVE | OUTPATIENT
Start: 2024-11-27 | End: 2024-11-27

## 2024-11-27 RX ORDER — ASPIRIN 325 MG
325 TABLET ORAL ONCE
Status: CANCELLED | OUTPATIENT
Start: 2024-11-27 | End: 2024-11-27

## 2024-11-27 RX ORDER — ACETAMINOPHEN 325 MG/1
650 TABLET ORAL EVERY 4 HOURS PRN
Status: DISCONTINUED | OUTPATIENT
Start: 2024-11-27 | End: 2024-12-13 | Stop reason: HOSPADM

## 2024-11-27 RX ORDER — OXYCODONE HYDROCHLORIDE 5 MG/1
10 TABLET ORAL EVERY 4 HOURS PRN
Status: DISCONTINUED | OUTPATIENT
Start: 2024-11-27 | End: 2024-12-13 | Stop reason: HOSPADM

## 2024-11-27 RX ORDER — FENTANYL CITRATE 50 UG/ML
INJECTION, SOLUTION INTRAMUSCULAR; INTRAVENOUS
Status: DISCONTINUED | OUTPATIENT
Start: 2024-11-27 | End: 2024-11-27 | Stop reason: HOSPADM

## 2024-11-27 RX ORDER — LIDOCAINE 40 MG/G
CREAM TOPICAL
Status: CANCELLED | OUTPATIENT
Start: 2024-11-27

## 2024-11-27 RX ORDER — LORAZEPAM 0.5 MG/1
0.5 TABLET ORAL
Status: CANCELLED | OUTPATIENT
Start: 2024-11-27

## 2024-11-27 RX ADMIN — HYDRALAZINE HYDROCHLORIDE 100 MG: 50 TABLET ORAL at 12:15

## 2024-11-27 RX ADMIN — APIXABAN 2.5 MG: 2.5 TABLET, FILM COATED ORAL at 16:02

## 2024-11-27 RX ADMIN — FLUTICASONE FUROATE AND VILANTEROL TRIFENATATE 1 PUFF: 200; 25 POWDER RESPIRATORY (INHALATION) at 08:06

## 2024-11-27 RX ADMIN — CARVEDILOL 25 MG: 25 TABLET, FILM COATED ORAL at 17:14

## 2024-11-27 RX ADMIN — ACETAMINOPHEN 650 MG: 325 TABLET, FILM COATED ORAL at 21:03

## 2024-11-27 RX ADMIN — CLOPIDOGREL BISULFATE 75 MG: 75 TABLET ORAL at 08:05

## 2024-11-27 RX ADMIN — CARVEDILOL 25 MG: 25 TABLET, FILM COATED ORAL at 08:04

## 2024-11-27 RX ADMIN — HYDRALAZINE HYDROCHLORIDE 10 MG: 20 INJECTION INTRAMUSCULAR; INTRAVENOUS at 06:12

## 2024-11-27 RX ADMIN — ISOSORBIDE MONONITRATE 120 MG: 60 TABLET, EXTENDED RELEASE ORAL at 08:04

## 2024-11-27 RX ADMIN — BUMETANIDE 2 MG: 0.25 INJECTION INTRAMUSCULAR; INTRAVENOUS at 08:13

## 2024-11-27 RX ADMIN — NICOTINE 1 PATCH: 21 PATCH, EXTENDED RELEASE TRANSDERMAL at 08:06

## 2024-11-27 RX ADMIN — HYDRALAZINE HYDROCHLORIDE 100 MG: 50 TABLET ORAL at 21:03

## 2024-11-27 RX ADMIN — GABAPENTIN 200 MG: 100 CAPSULE ORAL at 21:03

## 2024-11-27 RX ADMIN — IPRATROPIUM BROMIDE AND ALBUTEROL SULFATE 3 ML: .5; 3 SOLUTION RESPIRATORY (INHALATION) at 16:05

## 2024-11-27 RX ADMIN — FLUTICASONE PROPIONATE 1 SPRAY: 50 SPRAY, METERED NASAL at 08:05

## 2024-11-27 RX ADMIN — IPRATROPIUM BROMIDE AND ALBUTEROL SULFATE 3 ML: .5; 3 SOLUTION RESPIRATORY (INHALATION) at 11:02

## 2024-11-27 RX ADMIN — AMLODIPINE BESYLATE 10 MG: 10 TABLET ORAL at 08:05

## 2024-11-27 RX ADMIN — VALSARTAN 40 MG: 40 TABLET, FILM COATED ORAL at 17:14

## 2024-11-27 RX ADMIN — HYDRALAZINE HYDROCHLORIDE 100 MG: 50 TABLET ORAL at 17:14

## 2024-11-27 RX ADMIN — IPRATROPIUM BROMIDE AND ALBUTEROL SULFATE 3 ML: .5; 3 SOLUTION RESPIRATORY (INHALATION) at 07:07

## 2024-11-27 RX ADMIN — HYDRALAZINE HYDROCHLORIDE 100 MG: 50 TABLET ORAL at 08:05

## 2024-11-27 RX ADMIN — PREDNISONE 40 MG: 20 TABLET ORAL at 08:05

## 2024-11-27 RX ADMIN — BUMETANIDE 2 MG: 0.25 INJECTION INTRAMUSCULAR; INTRAVENOUS at 00:35

## 2024-11-27 RX ADMIN — ROSUVASTATIN CALCIUM 10 MG: 10 TABLET, FILM COATED ORAL at 21:04

## 2024-11-27 RX ADMIN — CHLORTHALIDONE 25 MG: 25 TABLET ORAL at 08:05

## 2024-11-27 ASSESSMENT — ACTIVITIES OF DAILY LIVING (ADL)
ADLS_ACUITY_SCORE: 54
ADLS_ACUITY_SCORE: 58
ADLS_ACUITY_SCORE: 54
ADLS_ACUITY_SCORE: 55
ADLS_ACUITY_SCORE: 53
ADLS_ACUITY_SCORE: 58
ADLS_ACUITY_SCORE: 58
ADLS_ACUITY_SCORE: 53
ADLS_ACUITY_SCORE: 53
ADLS_ACUITY_SCORE: 58
ADLS_ACUITY_SCORE: 53
ADLS_ACUITY_SCORE: 54
ADLS_ACUITY_SCORE: 58
ADLS_ACUITY_SCORE: 54
ADLS_ACUITY_SCORE: 54
ADLS_ACUITY_SCORE: 58
ADLS_ACUITY_SCORE: 53
ADLS_ACUITY_SCORE: 54
ADLS_ACUITY_SCORE: 58

## 2024-11-27 NOTE — PROGRESS NOTES
Renal Medicine Progress Note            Assessment/Plan:     Shirley Hendricks is a 64 year old female who was admitted on 11/20/2024.      Assessment:  1) chronic kidney disease stage IIIb  Followed by [facility] nephrology, Dr. Barboza and DARREL Roberson, last visit 11/14/2024     Noted history LIMA June/July 2024 requiring dialysis, discontinued 6/24     Hyperkalemia last month, spironolactone discontinued with potassium 6.0.  Potassium but normal this admission.      hyponatremia, initially mild but slightly worse yesterday at 128.  This despite reported very little p.o. intake and n.p.o. after midnight status.  Urine sodium yesterday not low although getting IV Bumex.  Low fractional secretion of urea does question of prerenal state although being hypertensive would not support this..       GFR slightly lower yesterday at 25, declining trend in setting of diuresis.  Awaiting right heart cath through guide diuretic management.     2) hypervolemic state, history of HFpEF: Current regimen include 2 mg IV every 8 hours Bumex, received 2.5 mg metolazone 11/24.  No significant edema on exam, last chest x-ray  with pulmonary edema.     3) hypertension: Currently on amlodipine 10 mg, hydralazine 100 mg 3 times daily, Imdur 120 mg daily, metoprolol 50 g twice daily,  Remains hypertensive.        Plan/Recs:  1) continue diuresis until right heart cath is performed, hopefully again today  2) will review renal function, electrolytes when blood draw results obtained    Sean Loving DO  [facility]  Office: [phone]  Cell: [phone]        Interval History:     Patient reports being tired, no significant changes otherwise.  Is thirsty and n.p.o. for possible right heart cath today.  Reports attempted blood draw was unsuccessful no labs to review yet today.  Pulmonary medicine consulted yesterday.    Remains hypertensive, this morning blood pressure 159/73.  2.7 L urine output yesterday.  Weight today 47.5 kg,  highest weight in the last week.       Medications and Allergies:     Current Facility-Administered Medications   Medication Dose Route Frequency Provider Last Rate Last Admin    amLODIPine (NORVASC) tablet 10 mg  10 mg Oral Daily Marcin White MD   10 mg at 11/27/24 0805    [Held by provider] apixaban ANTICOAGULANT (ELIQUIS) tablet 2.5 mg  2.5 mg Oral BID Yoli Huizar MD   2.5 mg at 11/24/24 1012    bumetanide (BUMEX) injection 2 mg  2 mg Intravenous Q8H Fish Chacko MD   2 mg at 11/27/24 0813    carvedilol (COREG) tablet 25 mg  25 mg Oral BID w/meals Lisa Zeng PA-C   25 mg at 11/27/24 0804    chlorthalidone (HYGROTON) tablet 25 mg  25 mg Oral Daily Lisa Zeng PA-C   25 mg at 11/27/24 0805    clopidogrel (PLAVIX) tablet 75 mg  75 mg Oral Daily Marcin White MD   75 mg at 11/27/24 0805    fluticasone (FLONASE) 50 MCG/ACT spray 1 spray  1 spray Both Nostrils Daily Liane Huertas MD   1 spray at 11/27/24 0805    fluticasone-vilanterol (BREO ELLIPTA) 200-25 MCG/ACT inhaler 1 puff  1 puff Inhalation Daily Marcin White MD   1 puff at 11/27/24 0806    gabapentin (NEURONTIN) capsule 200 mg  200 mg Oral At Bedtime Marcin White MD   200 mg at 11/26/24 2055    hydrALAZINE (APRESOLINE) tablet 100 mg  100 mg Oral 4x Daily Lisa Zeng PA-C   100 mg at 11/27/24 0805    ipratropium - albuterol 0.5 mg/2.5 mg/3 mL (DUONEB) neb solution 3 mL  3 mL Nebulization 4x daily Eli Peraza MD   3 mL at 11/27/24 0707    isosorbide mononitrate (IMDUR) 24 hr tablet 120 mg  120 mg Oral Daily Lisa Zeng PA-C   120 mg at 11/27/24 0804    nicotine (NICODERM CQ) 21 MG/24HR 24 hr patch 1 patch  1 patch Transdermal Daily Carlos Hunter MD   1 patch at 11/27/24 0806    Followed by    [START ON 1/2/2025] nicotine (NICODERM CQ) 14 MG/24HR 24 hr patch 1 patch  1 patch Transdermal Daily Carlos Hunter MD        Followed by    [START ON 1/30/2025] nicotine (NICODERM CQ) 7 MG/24HR 24 hr patch 1 patch  1  "patch Transdermal Daily Carlos Hunter MD        predniSONE (DELTASONE) tablet 40 mg  40 mg Oral Daily Eli Peraza MD   40 mg at 11/27/24 0805    rosuvastatin (CRESTOR) tablet 10 mg  10 mg Oral At Bedtime Marcin White MD   10 mg at 11/26/24 2055    sodium chloride (PF) 0.9% PF flush 3 mL  3 mL Intracatheter Q8H Marcin White MD   3 mL at 11/26/24 0342        Allergies   Allergen Reactions    Contrast Dye Anaphylaxis     Pt reported facial and throat swelling with prior CT contrast    Pantoprazole      Protonix caused diffuse edema    Chantix [Varenicline]      Terrible dreams    Gluten Meal GI Disturbance     Pt has celiac disease    Penicillins Itching and Rash            Physical Exam:   Vitals were reviewed  BP (!) 159/73   Pulse 55   Temp 98.2  F (36.8  C) (Oral)   Resp 16   Ht 1.575 m (5' 2\")   Wt 47.5 kg (104 lb 11.5 oz)   LMP  (LMP Unknown)   SpO2 98%   BMI 19.15 kg/m      Wt Readings from Last 3 Encounters:   11/27/24 47.5 kg (104 lb 11.5 oz)   09/23/24 44.8 kg (98 lb 12.8 oz)   08/15/24 47.2 kg (104 lb)       Intake/Output Summary (Last 24 hours) at 11/27/2024 1046  Last data filed at 11/27/2024 0654  Gross per 24 hour   Intake 1160 ml   Output 2100 ml   Net -940 ml       GENERAL: Frail, appears older than stated age  HEENT:  Normocephalic. No gross abnormalities  CV: RRR, 1/6 systolic murmur, no dependent edema or in left lower extremity  No edema noted  RESP: Clear bilaterally with good efforts  GI: Abdomen soft/nt/nd, BS normal.   MUSCULOSKELETAL: Right leg amputation  SKIN: no suspicious lesions or rashes, dry to touch  PSYCH: mood good, affect appropriate              Data:     BMP  Recent Labs   Lab 11/26/24  0312 11/25/24  0535 11/24/24  0828 11/23/24  1035   * 132* 131* 130*   POTASSIUM 4.6 4.5 4.6 4.3   CHLORIDE 92* 96* 97* 94*   SONIA 8.1* 8.6* 8.3* 8.4*   CO2 23 23 21* 20*   BUN 94.3* 85.1* 83.2* 74.6*   CR 2.14* 2.00* 1.87* 1.89*   * 90 128* 112*     CBC  Recent Labs "   Lab 11/26/24  0312 11/25/24  0535 11/24/24  0828 11/23/24  1035   WBC 7.3 8.1 9.0 9.6   HGB 10.8* 11.4* 11.2* 11.5*   HCT 33.2* 35.6 35.1 36.3   MCV 83 83 84 84    277 308 313     Lab Results   Component Value Date    AST 16 11/21/2024    ALT 15 11/21/2024    ALKPHOS 66 11/21/2024    BILITOTAL 0.2 11/21/2024    BILICONJ 0.0 01/23/2004     Lab Results   Component Value Date    INR 1.31 (H) 04/11/2024       Attestation:  I have reviewed today's vital signs, notes, medications, labs and imaging.    DO [facility] - Nephrology  Office: [phone]  Cell: [phone]

## 2024-11-27 NOTE — PLAN OF CARE
A&O X4.VSS on 2L NC, except HTN.TELE SR/SB.Lung Sounds diminished.Bowel Sounds active, -BM.Voiding adequately, external cath in place. Blanchable redness in coccyx. Up assist 2 lift, pt frequently shift self in bed. Pt denies pain. Tolerating 2g NA 1800 ml FR plan. Heparin drip stopped transitioned back to Eliquis. R heart cath complete dressing over access site on R neck CDI, CMS intact

## 2024-11-27 NOTE — PROGRESS NOTES
"CLINICAL NUTRITION SERVICES  -  ASSESSMENT NOTE    Recommendations Ordered by Registered Dietitian (RD):   Add Gluten Free to diet order, per request  Encouraged po intake, emphasizing the importance of protein   Malnutrition:   % Weight Loss:  Weight loss does not meet criteria for malnutrition - pt w/ AKA during time of wt loss  % Intake:  No decreased intake noted  Subcutaneous Fat Loss:  Global mild losses   Muscle Loss:  Global moderate  Fluid Retention:  Trace    Malnutrition Diagnosis: Moderate malnutrition  In Context of:  Chronic illness or disease     REASON FOR ASSESSMENT  Shirley Hendricks is a 64 year old female seen by Registered Dietitian for [facility] of: COPD, hypertension, CHF, peripheral artery disease, NSTEMI, stage 4 CKD, and tobacco use who is being admitted for evaluation of shortness of breath.     NUTRITION HISTORY  - Information obtained from chart review and pt in bed  - Pt denied any reduced po intake PTA  - Supplements - pt can't have most of the supplements here d/t gluten and her old wounds have healed, so she doesn't need any Expedite    CURRENT NUTRITION ORDERS  Diet Order:   2000 mg Sodium and 1800 ml FR     Current Intake/Tolerance: Pt reported to be eating very well when she's not NPO. Excited to have her daughter bring her sushi tonight.   - Previously pt has requested to both have a gluten free diet order and to self select gluten free items. Adding a gluten free diet was offered and she wants that added to her diet order. However later she asked \"how bad would it bee if she had stuffing brought to her tomorrow?\" RD encouraged her to have her daughter make a gluten free batch of stuffing for her, but she didn't want to have them make a special batch for her. She was encouraged to avoid gluten.     NUTRITION FOCUSED PHYSICAL ASSESSMENT FOR DIAGNOSING MALNUTRITION)  Yes           Observed:    Muscle wasting (refer to documentation in Malnutrition section) and Subcutaneous fat " "loss (refer to documentation in Malnutrition section)    ANTHROPOMETRICS  Height: 5' 2\"  Weight: 104 lbs 11.5 oz   Body mass index is 19.15 kg/m .  Weight Status:  Normal BMI  IBW: 47.1 kg (adjusted for AKA)  % IBW: 101%  Weight History:   Wt Readings from Last 10 Encounters:   11/27/24 47.5 kg (104 lb 11.5 oz)   09/23/24 44.8 kg (98 lb 12.8 oz)   08/15/24 47.2 kg (104 lb)   08/07/24 47.2 kg (104 lb 0.9 oz)   07/08/24 50 kg (110 lb 3.7 oz)   06/05/24 60.3 kg (132 lb 15 oz)   05/29/24 62.3 kg (137 lb 5.6 oz)   05/14/24 57 kg (125 lb 10.6 oz)   04/22/24 50.5 kg (111 lb 5.3 oz)   01/08/24 49.8 kg (109 lb 12.8 oz)     LABS  Reviewed     MEDICATIONS  Reviewed     ASSESSED NUTRITION NEEDS PER APPROVED PRACTICE GUIDELINES:  Dosing Weight 47.5 kg   Estimated Energy Needs: 2976-9465+ kcals (25-30+ Kcal/Kg)  Justification: maintenance + COPD  Estimated Protein Needs: 48-62 grams (1-1.3 g pro/Kg)  Justification:  stage 4 CKD while hospitalized  Estimated Fluid Needs: 1800 ml restriction  Justification: per provider pending fluid status    MALNUTRITION:   % Weight Loss:  Weight loss does not meet criteria for malnutrition - pt w/ AKA during time of wt loss  % Intake:  No decreased intake noted  Subcutaneous Fat Loss:  Global mild losses   Muscle Loss:  Global moderate  Fluid Retention:  Trace    Malnutrition Diagnosis: Moderate malnutrition  In Context of:  Chronic illness or disease    NUTRITION DIAGNOSIS:  Inadequate oral intake related to multiple chronic co-morbidities and diet interruptions as evidenced by mild fat and moderate muscle losses    NUTRITION INTERVENTIONS  Recommendations / Nutrition Prescription  Add Gluten Free to diet order, per request  Encouraged po intake, emphasizing the importance of protein  Continue (now combination) 2 g Na diet and Gluten free diet, 1800 ml FR    Implementation  Nutrition education: Per Provider order if indicated   Composition of Meals and Snacks    Nutrition Goals  PO - >75% meal " trays TID    MONITORING AND EVALUATION:  Progress towards goals will be monitored and evaluated per protocol and Practice Guidelines    Vasquez Wang MS, RD, LD

## 2024-11-27 NOTE — PROGRESS NOTES
[facility]  Cardiology Progress Note  Date of Service: 11/27/2024  Primary Cardiologist: Dr Mason    Assessment & Plan    Shirley Cammy Hendricks is a 64 year old female with complicated past medical history including COPD, hypertension, CHF, peripheral arterial disease, coronary artery disease and stage IV CKD admitted on 11/20/2024 with shortness of breath    Assessment  Acute hypoxic respiratory failure, thought to be multifactorial  Heart failure with preserved ejection fraction  Acute decompensated heart failure with preserved ejection fraction  Pleural effusions  Hypertensive urgency/emergency- Bps still markedly above goal   Mild mitral regurgitation  Mild tricuspid regurgitation  Very complex patient.  Will need to optimize her hypertension and monitor her creatinine closely.  Will continue IV diuresis and guide diuresis based on right heart cath today.  Respiratory status is improving.  Right heart catheterization that I reviewed today showed normal right and left-sided filling pressures, no evidence of pulmonary hypertension and normal cardiac output.  Atrial fibrillation with RVR, resolved heart rates now well-controlled based on observation of telemetry today.  Apixaban 2.5 mg twice daily held in anticipation of upcoming right heart cath; bridged with heparin given history  Continue metoprolol tartrate  Coronary artery disease,  10/2023 angiogram with  of the D1 with collaterals from distal LAD, elsewhere moderate disease  No signs or symptoms of angina  PAD, status post BKA April 2024.  She is status post aortofemoral bypass and left femoropopliteal bypass.  She has right femoral graft limb to above-the-knee popliteal bypass in 2020.  Status post right femoral to anterior tibial PTFE bypass graft with single-vessel runoff in October 2011 followed by embolectomy to her graft in October 2013  No signs of acute limb ischemia  History of malignant B-cell lymphoma  CKD stage III,  history of requiring temporary dialysis June and July 2024  Avoid spironolactone given history of hyperkalemia  Nephrology following.  Renal function slightly improved from yesterday    Plan:  Right heart cath today  Apixaban held in anticipation, bridging with heparin given complicated history.  Resume apixaban after right heart cath  Recommendations pending results  Stop Bumex, hold tomorrow  Starting Friday, start Bumex 2 mg BID  Patient would benefit from low-dose ACE or ARB, as holding Bumex, will start low dose valsratan 40 mg daily and monitor BMP closely   Continue carvedilol 25 mg twice daily, started yesterday after metoprolol discontinued  Continue chlorthalidone 25 mg daily, started 11/26/2024  Continue hydralazine 100 mg 4 times daily  Continue Imdur 120 mg daily  Continue clopidogrel for PAD and CAD  Continue rosuvastatin    35 MINUTES SPENT BY ME on the date of service doing chart review, history, exam, documentation & further activities per the note.  Discussed with directly with Dr. Loving from nephrology    Lisa Zeng PA-C  [facility]  Pager: through Vocera    Interval History   Mood is lower today.  Feels like she is breathing easier.  Was able to lay at about 30 degrees without orthopnea or PND.  No chest pain or pressure.  No dizziness or lightheadedness.  No palpitations or racing heart.    Data   Last 24 hours diagnostics reviewed:    November 21, 2024 echocardiogram :  The left ventricle is normal in size.  Left ventricular systolic function is normal.  The visual ejection fraction is 55-60%.  Normal left ventricular wall motion  The right ventricle is normal in structure, function and size.  There is mild (1+) mitral regurgitation.  There is mild (1+) tricuspid regurgitation.  Right ventricular systolic pressure is elevated, consistent with mild  pulmonary hypertension.  Compared to prior study, there is no significant change.    Chest x-ray November 2024:  MPRESSION: Heart size is  enlarged and there is moderate bilateral pulmonary edema with small bilateral pleural effusions. Findings are compatible with congestive heart failure. Overlying infectious process involving the lung bases is not excluded     Telemetry:   Heart rates well-controlled      Physical Exam   Temp: 98.2  F (36.8  C) Temp src: Oral BP: (!) 158/69 Pulse: 56   Resp: 14 SpO2: 97 % O2 Device: High Flow Nasal Cannula (HFNC) Oxygen Delivery: (S) 30 LPM  Vitals:    11/25/24 0607 11/26/24 0325 11/27/24 0700   Weight: 43.2 kg (95 lb 3.8 oz) 45.2 kg (99 lb 10.4 oz) 47.5 kg (104 lb 11.5 oz)       GEN: Awake and alert, wearing oxygen  HEART: No tachycardia  LUNGS: Breath sounds diminished   EXT: no swelling, right BKA    Medications   Current Facility-Administered Medications   Medication Dose Route Frequency Provider Last Rate Last Admin    heparin 25,000 units in 0.45% NaCl 250 mL ANTICOAGULANT infusion  0-5,000 Units/hr Intravenous Continuous Fish Chacko MD 7 mL/hr at 11/27/24 1215 700 Units/hr at 11/27/24 1215    No lozenges or gum should be given while patient on BIPAP/AVAPS/AVAPS AE   Does not apply Continuous PRN Marcin White MD        Patient may continue current oral medications   Does not apply Continuous PRN Marcin White MD         Current Facility-Administered Medications   Medication Dose Route Frequency Provider Last Rate Last Admin    amLODIPine (NORVASC) tablet 10 mg  10 mg Oral Daily Marcin White MD   10 mg at 11/27/24 0805    [Held by provider] apixaban ANTICOAGULANT (ELIQUIS) tablet 2.5 mg  2.5 mg Oral BID Yoli Huizar MD   2.5 mg at 11/24/24 1012    bumetanide (BUMEX) injection 2 mg  2 mg Intravenous Q8H Fish Chacko MD   2 mg at 11/27/24 0813    carvedilol (COREG) tablet 25 mg  25 mg Oral BID w/meals Lisa Zeng PA-C   25 mg at 11/27/24 0804    chlorthalidone (HYGROTON) tablet 25 mg  25 mg Oral Daily Lisa Zeng PA-C   25 mg at 11/27/24 0805    clopidogrel (PLAVIX) tablet 75 mg  75 mg Oral  Daily Marcin White MD   75 mg at 11/27/24 0805    fluticasone (FLONASE) 50 MCG/ACT spray 1 spray  1 spray Both Nostrils Daily Liane Huertas MD   1 spray at 11/27/24 0805    fluticasone-vilanterol (BREO ELLIPTA) 200-25 MCG/ACT inhaler 1 puff  1 puff Inhalation Daily Marcin White MD   1 puff at 11/27/24 0806    gabapentin (NEURONTIN) capsule 200 mg  200 mg Oral At Bedtime Marcin White MD   200 mg at 11/26/24 2055    hydrALAZINE (APRESOLINE) tablet 100 mg  100 mg Oral 4x Daily Lisa Zeng PA-C   100 mg at 11/27/24 1215    ipratropium - albuterol 0.5 mg/2.5 mg/3 mL (DUONEB) neb solution 3 mL  3 mL Nebulization 4x daily Eli Peraza MD   3 mL at 11/27/24 1102    isosorbide mononitrate (IMDUR) 24 hr tablet 120 mg  120 mg Oral Daily Lisa Zeng PA-C   120 mg at 11/27/24 0804    nicotine (NICODERM CQ) 21 MG/24HR 24 hr patch 1 patch  1 patch Transdermal Daily Carlos Hunter MD   1 patch at 11/27/24 0806    Followed by    [START ON 1/2/2025] nicotine (NICODERM CQ) 14 MG/24HR 24 hr patch 1 patch  1 patch Transdermal Daily Carlos Hunter MD        Followed by    [START ON 1/30/2025] nicotine (NICODERM CQ) 7 MG/24HR 24 hr patch 1 patch  1 patch Transdermal Daily Carlos Hunter MD        predniSONE (DELTASONE) tablet 40 mg  40 mg Oral Daily Eli Peraza MD   40 mg at 11/27/24 0805    rosuvastatin (CRESTOR) tablet 10 mg  10 mg Oral At Bedtime Marcin White MD   10 mg at 11/26/24 2055    sodium chloride (PF) 0.9% PF flush 3 mL  3 mL Intracatheter Q8H Marcin White MD   3 mL at 11/26/24 0342

## 2024-11-27 NOTE — PROGRESS NOTES
[facility]    Medicine Progress Note - Hospitalist Service    Date of Admission:  11/20/2024    Assessment & Plan   Shirley Hendricks is a 64 year old female, on Plavix, with a history of COPD, hypertension, CHF, peripheral artery disease, NSTEMI, stage 4 CKD, and tobacco use who is being admitted for evaluation of shortness of breath.      Acute hypoxic respiratory failure   Acute on chronic diastolic heart failure in exacerbation:  Moderate to severe left pleural effusion status post thoracentesis  Type II NSTEMI likely due to demand due to heart failure exacerbation  # Community Acquired Pnuemonia  Assessment: Presents with 2-day history of increasing shortness of breath and a cough.  On admission chest x-ray shows a large left pleural effusion with compressive atelectasis.  There is also diffuse interstitial opacities consistent with pulmonary edema.  No pneumothorax was noted.  Labs are pertinent for a proBNP of 26,004-20, mildly elevated point of 98, creatinine 1.71. PTA on Norvasc 10 mg daily, atenolol 25 mg daily, torsemide 50 mg daily, Jardiance 10 mg daily  Plan:  - - Ultrasound guided thoracentesis on 11/20/24 s/p .1 liters of straw-colored fluid were removed and sent to lab, if requested.   -CT showed bilateral pleural effusions and repeat Thoracentesis ordered  -Keep oxygen saturation above 90%  -Duplex on 11/21/24 no DVT  --Patient refused right sided thoracentesis and left sided 1200ml drained yellow colored fluid was drained on 11/21/24 .  Lights criteria consistent with transudate.  Cultures negative  Right-sided thoracentesis ordered and 1.5 l fluid removed on 11/22/24  --Solu-Medrol changed to prednisone on 11/24 for a total of 5 days  -TE done during this hospitalization on 11/22/2024 showed LVEF normal at 55 to 60%.  RV function normal.  Mild 1+ MR, 1+ TR.  Mild pulmonary hypertension.  Compared to prior study there is no significant change     -Apixaban on hold due  to right heart cath tomorrow  -Continue cefuroxime for 2 more days and patient completed azithromycin course  Hydralazine increased to 100 mg 4 times a day  - 120 mg  -Continue Bumex 2 mg every 8 hours  -Monitor electrolytes and potassium while on diuresis  -VQ scan could not be done due to oxygen requirements  -Keep oxygen saturations above 80%    Intake/Output Summary (Last 24 hours) at 11/26/2024 1823  Last data filed at 11/26/2024 1821  Gross per 24 hour   Intake 1040 ml   Output 2550 ml   Net -1510 ml       -Right heart cath tomorrow  -Pulmonology reconsulted     # Hypertensive emergency  -Hydralazine increased to 100 mg 4 times a day  -Isosorbide increased from 60 mg to 120 mg  -Continue Bumex 2 mg every 8 hours  -Monitor electrolytes and potassium while on diuresis  -Cardiology added chlorthalidone        # New onset atrial fibrillation with RVR  -Converted -Sinus bradycardia  Metoprolol changed to carvedilol        Hypertension  Hyperlipidemia  Coronary artery disease with history of MI in 2019  Peripheral vascular disease with history of 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 2024  Assessment: PTA on amlodipine 10 mg, Coreg changed to atenolol 25 mg daily, hydralazine 50 mg 3 times daily, Imdur 60 mg daily, Crestor 10 mg  Plan:  -See above for management of her hypertension        # Mild hyponatremia  Na 128  -Continue diuresis         Acute kidney injury  on CKD stage III  Assessment: Creatinine on admission of 1.71, which is within her baseline.1.2-1.4  Plan:  -Daily BMP while on IV diuresis  -Avoid NSAIDs/nephrotoxins  Creatinine improved to 1.89 and patient started on Bumex  -Nephrology consulted  -Nephrology ordered UA urine proteinuria, urine sodium, urea and creatinine  -Avoid ACE inhibitors and mineralocorticoid antagonist afebrile with history of right total hip  -Cr 2.14 today         History of COPD not in exacerbation  - Continue with Breo and as needed albuterol nebs available     GERD  - Continue with PTA  famotidine     Anemia of Chronic Disease  Baseline hemoglobin has been between 8-9 over the past few months  Plan:  Hb 11.5 No active bleeding            History of thrombocytopenia  - Platelet count normal on admission     Tobacco use  - Continue with nicotine patch  -Counselled on cessation             Diet: Fluid restriction 1800 ML FLUID  2 Gram Sodium Diet    DVT Prophylaxis: Heparin SQ  Alvarez Catheter: Not present  Lines: None     Cardiac Monitoring: ACTIVE order. Indication: Acute decompensated heart failure (48 hours)  Code Status: Full Code      Clinically Significant Risk Factors         # Hyponatremia: Lowest Na = 128 mmol/L in last 2 days, will monitor as appropriate  # Hypochloremia: Lowest Cl = 92 mmol/L in last 2 days, will monitor as appropriate      # Hypoalbuminemia: Lowest albumin = 3 g/dL at 11/21/2024  4:42 AM, will monitor as appropriate     # Hypertension: Noted on problem list                # Financial/Environmental Concerns: none         Social Drivers of Health    Tobacco Use: Medium Risk (11/14/2024)    Received from [employer]    Patient History     Smoking Tobacco Use: Former     Smokeless Tobacco Use: Never     Passive Exposure: Past          Disposition Plan     Medically Ready for Discharge: Anticipated in 2-4 Days             Yoli Huizar MD  Hospitalist Service  [facility]  Securely message with [facility] (more info)  Text page via [employer] Paging/Directory   ______________________________________________________________________    Interval History     No acute overnight events continues to be on high flow oxygen.  Blood pressures are in the 160s today.    Pulmonology consulted.  On heparin drip.    Was n.p.o for cardiac catheterization which could not be done today    Physical Exam   Vital Signs: Temp: 97.7  F (36.5  C) Temp src: Oral BP: (!) 173/71 Pulse: 69   Resp: (!) 41 SpO2: 97 % O2 Device: High Flow Nasal Cannula (HFNC)  Oxygen Delivery: 45 LPM  Weight: 99 lbs 10.37 oz    Physical Exam  Cardiovascular:      Rate and Rhythm: Normal rate and regular rhythm.      Heart sounds: Normal heart sounds.   Pulmonary:      Effort: No respiratory distress.      Breath sounds: Normal breath sounds. No wheezing.   Abdominal:      General: There is no distension.      Palpations: Abdomen is soft.      Tenderness: There is no abdominal tenderness.   Musculoskeletal:      Comments: Trace edema          Medical Decision Making       Data     I have personally reviewed the following data over the past 24 hrs:    7.3  \   10.8 (L)   / 272     128 (L) 92 (L) 94.3 (H) /  116 (H)   4.6 23 2.14 (H) \     Procal: 0.07 CRP: N/A Lactic Acid: N/A       INR:  N/A PTT:  88 (H)   D-dimer:  N/A Fibrinogen:  N/A       Imaging results reviewed over the past 24 hrs:   No results found for this or any previous visit (from the past 24 hours).

## 2024-11-27 NOTE — PLAN OF CARE
"Goal Outcome Evaluation:  Patient Name: Larry  MRN: [id number]  Date of Admission: 11/20/2024  Reason for Admission: SOB, bilateral pleural effusions, CAP, HF exacerbation  Level of Care: [facility]   11/26/ 24 [id number]  Vitals:   BP Readings from Last 1 Encounters:  11/27/24 : (!) 194/83    Pulse Readings from Last 1 Encounters:  11/27/24 : 54    Wt Readings from Last 1 Encounters:  11/26/24 : 45.2 kg (99 lb 10.4 oz)    Ht Readings from Last 1 Encounters:  11/20/24 : 1.575 m (5' 2\")    Estimated body mass index is 18.23 kg/m  as calculated from the following:    Height as of this encounter: 1.575 m (5' 2\").    Weight as of this encounter: 45.2 kg (99 lb 10.4 oz).  Temp Readings from Last 1 Encounters:  11/27/24 : 98  F (36.7  C) (Oral)    Pain: denies pain this shift    CV Surgery Patient: No    Assessment    Resp:  Hypertensive SBP 170S, otherwise VSS on HFNC 40L 99% FiO2. LS coarse/diminished.  PRN IV hydralazine given x1 this am, available for SBP>180  Telemetry:  SR/SB with peaked T wave  Neuro: A&Ox4   GI/:  Purewick in place. No BM this shift, BS+  Skin/Wounds:  Blanchable redness to sacrum  Lines/Drains: 1 PIV infusing Heparin at 900 units/hr. PTT recheck at 0830  Activity: Ax1 pivot to commode,  frequently shift self in bed  Sleep: fair, sleeps between cares  Abnormal Labs:     Aggression Stop Light: Green          Patient Care Plan: NPO at midnight. Right heart cath for  today. Pulmonology reconsulted, sputum culture still need to collect. Nephrology consulted. plan continue plan of care.                        "

## 2024-11-27 NOTE — PLAN OF CARE
[id number]  Orientations: A&Ox4   Vitals/Pain: VSS except for HTN -170s. Tylenol PRN x1 for minor back pain  Tele: SR/SB  Lines/Drains: 1 PIV infusing Heparin at 750 units/hr. PTT recheck at 0120. Purewick in place   Skin/Wounds: Blanchable redness to sacrum   GI/: -BM. Adequate UOP   Labs: PTT recheck at 0120   Ambulation/Assist: Ax1 pivot to commode  Sleep Quality: some in between cares  Plan: NPO at midnight. Cath planned for tomorrow. Continue plan of care

## 2024-11-27 NOTE — PRE-PROCEDURE
GENERAL PRE-PROCEDURE:   Procedure:  Right heart catheterization  Date/Time:  11/27/2024 1:14 PM    Verbal consent obtained?: Yes    Written consent obtained?: Yes    Risks and benefits: Risks, benefits and alternatives were discussed    Consent given by:  Patient  Patient states understanding of procedure being performed: Yes    Patient's understanding of procedure matches consent: Yes    Procedure consent matches procedure scheduled: Yes    Expected level of sedation:  Minimal  Appropriately NPO:  Yes  ASA Class:  3  Mallampati  :  Grade 2- soft palate, base of uvula, tonsillar pillars, and portion of posterior pharyngeal wall visible  Lungs:  Lungs clear with good breath sounds bilaterally  Heart:  Normal heart sounds and rate  History & Physical reviewed:  History and physical reviewed and no updates needed  Statement of review:  I have reviewed the lab findings, diagnostic data, medications, and the plan for sedation

## 2024-11-27 NOTE — PROGRESS NOTES
[facility]    Medicine Progress Note - Hospitalist Service    Date of Admission:  11/20/2024    Assessment & Plan   Shirley Hendricks is a 64 year old female, on Plavix, with a history of COPD, hypertension, CHF, peripheral artery disease, NSTEMI, stage 4 CKD, and tobacco use who is being admitted for evaluation of shortness of breath.      Acute hypoxic respiratory failure   Acute on chronic diastolic heart failure in exacerbation:  Moderate to severe left pleural effusion status post thoracentesis  Type II NSTEMI likely due to demand due to heart failure exacerbation  # Community Acquired Pnuemonia  Assessment: Presents with 2-day history of increasing shortness of breath and a cough.  On admission chest x-ray shows a large left pleural effusion with compressive atelectasis.  There is also diffuse interstitial opacities consistent with pulmonary edema.  No pneumothorax was noted.  Labs are pertinent for a proBNP of 26,004-20, mildly elevated point of 98, creatinine 1.71. PTA on Norvasc 10 mg daily, atenolol 25 mg daily, torsemide 50 mg daily, Jardiance 10 mg daily  Plan:  - - Ultrasound guided thoracentesis on 11/20/24 s/p .1 liters of straw-colored fluid were removed and sent to lab, if requested.   -CT showed bilateral pleural effusions and repeat Thoracentesis ordered  -Keep oxygen saturation above 90%  -Duplex on 11/21/24 no DVT  --Patient refused right sided thoracentesis and left sided 1200ml drained yellow colored fluid was drained on 11/21/24 .  Lights criteria consistent with transudate.  Cultures negative  Right-sided thoracentesis ordered and 1.5 l fluid removed on 11/22/24  --Solu-Medrol changed to prednisone on 11/24 for a total of 5 days  -TE done during this hospitalization on 11/22/2024 showed LVEF normal at 55 to 60%.  RV function normal.  Mild 1+ MR, 1+ TR.  Mild pulmonary hypertension.  Compared to prior study there is no significant change     -Completed treatment  for community-acquired pneumonia with cefuroxime and azithromycin  Hydralazine increased to 100 mg 4 times a day  -Continue isosorbide 120 mg  -Monitor electrolytes and potassium while on diuresis  --Keep oxygen saturations above 80%  -Diuretic dosing as per cardiology.  -Right heart catheterization done on 11/27/24  -Continue DuoNebs  -Pulmonology consult appreciated        Intake/Output Summary (Last 24 hours) at 11/27/2024 1540  Last data filed at 11/27/2024 1506  Gross per 24 hour   Intake 1680 ml   Output 2800 ml   Net -1120 ml          -Restart apixaban after right heart catheterization heart catheterization         # Hypertensive emergency  -See above manageemnt    # New onset atrial fibrillation with RVR  -Converted -Sinus bradycardia  -Continue apixaban  -Continue carvedilol  -Pharmacy liaison consult on 11/25/2024-Xarelto/Eliquis $12  per month  Hypertension  Hyperlipidemia  Coronary artery disease with history of MI in 2019  Peripheral vascular disease with history of 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 2024  Assessment: PTA on amlodipine 10 mg, Coreg changed to atenolol 25 mg daily, hydralazine 50 mg 3 times daily, Imdur 60 mg daily, Crestor 10 mg  Plan:  -See above for management of her hypertension        # Mild hyponatremia  -Sodium improved from 1 28-1 33  -Continue diuresis         Acute kidney injury  on CKD stage III  Assessment: Creatinine on admission of 1.71, which is within her baseline.1.2-1.4  Plan:  -Daily BMP while on IV diuresis  -Avoid NSAIDs/nephrotoxins  Creatinine improved to 1.89 and patient started on Bumex  -Nephrology consulted  -Nephrology ordered UA urine proteinuria, urine sodium, urea and creatinine  -Avoid ACE inhibitors and mineralocorticoid antagonist afebrile with history of right total hip  -Cr improved to 1.92        History of COPD not in exacerbation  - Continue with Breo and as needed albuterol nebs available     GERD  - Continue with PTA famotidine     Anemia of Chronic  Disease  Baseline hemoglobin has been between 8-9 over the past few months  Plan:  Hb 11.5 No active bleeding            History of thrombocytopenia  - Platelet count normal on admission     Tobacco use  - Continue with nicotine patch  -Counselled on cessation             Diet: Fluid restriction 1800 ML FLUID  Combination Diet Gluten Free Diet; 2 gm NA Diet    DVT Prophylaxis: DOAC  Alvarez Catheter: Not present  Lines: None     Cardiac Monitoring: ACTIVE order. Indication: Post- PCI/Angiogram (24 hours)  Code Status: Full Code      Clinically Significant Risk Factors         # Hyponatremia: Lowest Na = 128 mmol/L in last 2 days, will monitor as appropriate  # Hypochloremia: Lowest Cl = 92 mmol/L in last 2 days, will monitor as appropriate      # Hypoalbuminemia: Lowest albumin = 3 g/dL at 11/21/2024  4:42 AM, will monitor as appropriate     # Hypertension: Noted on problem list             # Moderate Malnutrition: based on nutrition assessment    # Financial/Environmental Concerns: none         Social Drivers of Health    Tobacco Use: Medium Risk (11/14/2024)    Received from [employer]    Patient History     Smoking Tobacco Use: Former     Smokeless Tobacco Use: Never     Passive Exposure: Past          Disposition Plan     Medically Ready for Discharge: Anticipated in 2-4 Days             Yoli Huizar MD  Hospitalist Service  [facility]  Securely message with [email or web address] (more info)  Text page via [employer] Paging/Directory   ______________________________________________________________________    Interval History   Patient seen and examined at bedside.    Patient this morning oxygen was weaned off to 45% FiO2 with flow of 30 L    Patient feels well.  Her breathing is improving.    Down to nasal cannula 2 L per afternoon        Physical Exam   Vital Signs: Temp: 98.2  F (36.8  C) Temp src: Oral BP: (!) 168/74 Pulse: 61   Resp: 12 SpO2: 94 % O2 Device: Nasal cannula  Oxygen Delivery: 2 LPM  Weight: 104 lbs 11.5 oz    Physical Exam  Cardiovascular:      Rate and Rhythm: Normal rate and regular rhythm.      Heart sounds: Normal heart sounds.   Pulmonary:      Effort: Pulmonary effort is normal. No respiratory distress.      Breath sounds: Normal breath sounds.   Abdominal:      General: There is no distension.      Palpations: Abdomen is soft.      Tenderness: There is no abdominal tenderness.   Musculoskeletal:      Right lower leg: No edema.      Left lower leg: No edema.          Medical Decision Making       Data     I have personally reviewed the following data over the past 24 hrs:    7.6  \   11.2 (L)   / 278     133 (L) 92 (L) 86.4 (H) /  93   4.1 28 1.92 (H) \     Procal: N/A CRP: N/A Lactic Acid: <0.3       INR:  N/A PTT:  82 (H)   D-dimer:  N/A Fibrinogen:  N/A       Imaging results reviewed over the past 24 hrs:   Recent Results (from the past 24 hours)   Cardiac Catheterization    Narrative    1) Normal right and left sided filling pressures  2) No evidence of pulmonary hypertension  3) Normal CO/CI

## 2024-11-27 NOTE — PROGRESS NOTES
[facility]   Pulmonary   Consult Note 11/26/2024  Shirley Hendricks MRN: [id number]      Assessment & Plan    Shirley Hendricks is a 64 year old female with medical history significant for COPD, hypertension, CHF, peripheral arterial disease (on Plavix), CKD stage IV, CAD who was admitted on 11/20/2024 with 2 days of worsening shortness of breath and cough.  Despite diuresis and bilateral thoracentesis (transudative fluid) she remains hypoxic. CT chest performed 11/21 shows moderate right pleural effusion with adjacent atelectasis as well as new patchy consolidative opacities within the right middle and lower lobes, moderate left sided pleural effusion, interstitial pulmonary edema and prominent lymph nodes.  Trace pericardial effusion was also noted.  At this time I agree with ongoing diuresis as tolerated with plan for right heart cath especially to assess volume status.  There is concern for pulmonary hypertension given chronic lung disease, and chronic heart disease.  CT findings could also be consistent with lobar pneumonia but she has received nearly a week of antibiotics (azithromycin, ceftriaxone, cefuroxime) without evidence of ongoing leukocytosis or fever.    Recommendations:   - sputum culture (ordered)  - Discontinue budesonide nebulization; continue Breo Ellipta  - continue DuoNebs 4 times daily; continue aerobika device 4 times daily  - Agree with prednisone 40 mg daily x 5 days  - RHC / diuresis per cards / primary team   - fluticasone nasal spray every day ordered   - nicotine patch prn   - wean supplemental O2 to maintain SpO2 >/= 92%     Thank you for involving us in the care of this interesting patient. Pulmonary team is not in-house over the long holiday. Please feel free to reach out with any questions or concerns.       Liaen Huertas MD  Pulmonary and Critical Care Medicine   11/27/2024    This note was created using dictation software and may contain errors.   Please contact the creator for any clarifications that are needed.           Subjective:    Poor sleep overnight. Feels breathing is somewhat improved. Sputum remains thick and difficult to cough out. Started using aerobika. BP improved this am at 159 systolic, planning for RHC.           Review of Symptoms:   10-point ROS reviewed, & found negative w/ exceptions noted in the HPI.          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 clot    Post-menopausal     Reflux esophagitis 02/2004    EGD: esophagitis and medium HH    SBO (small bowel obstruction) (H) 08/10/2021    SVT (supraventricular tachycardia) (H)     Thrombocytopenia (H)     Uncomplicated asthma     Vitamin C deficiency 08/12/2018       Past Surgical History:   Procedure Laterality Date    AMPUTATE LEG ABOVE KNEE N/A 4/1/2024    Procedure: AMPUTATION, ABOVE KNEE right leg with wound vac dressing, excision of anteriotibial bypass graft, closure of (previous interventional radiology) left groin incision;  Surgeon: José Luis Hernandez MD;  Location: [facility] OR    AMPUTATE LEG ABOVE KNEE Right 4/9/2024    Procedure: COMPLETION RIGHT ABOVE KNEE AMPUTATION;  Surgeon:  José Luis Hernandez MD;  Location:  OR    ANGIOGRAM      ANGIOGRAM Right 6/23/2021    Procedure: RIGHT LOWER EXTREMITY ANGIOGRAM WITH LEFT BRACHIAL ARTERY CUTDOWN;  Surgeon: José Luis Hernandez MD;  Location: [facility] OR    APPLY WOUND VAC Right 5/16/2024    Procedure: PLACEMENT OF WOUND VAC TO RIGHT ABOVE KNEE AMPUTATION;  Surgeon: Tay Enciso MD;  Location: [facility] OR    APPLY WOUND VAC N/A 5/20/2024    Procedure: AND PLACEMENT OF WOUND VAC;  Surgeon: José Luis Hernandez MD;  Location: [facility] OR    BIOPSY MASS NECK Right 10/11/2023    Procedure: Right Parotid Biopsy;  Surgeon: Randal Mendoza MD;  Location:  OR    BRONCHOSCOPY FLEXIBLE AND RIGID N/A 6/26/2024    Procedure: Bronchoscopy flexible and rigid;  Surgeon: Rod Nath MD;  Location:  GI    BYPASS GRAFT FEMOROPOPLITEAL Right 09/23/2020    Procedure: RIGHT FEMORAL GRAFT TO ABOVE-KNEE POPLITEAL BYPASS USING CADAVERIC SUPERFICIAL FEMORAL ARTERY;  Surgeon: Chadwick Lozano MD;  Location:  OR    BYPASS GRAFT FEMOROPOPLITEAL Right 2/15/2022    Procedure: RIGHT SUPERFICIAL FEMORAL ARTERY GRAFT TO BELOW KNEE POPLITEAL BYPASS WITH CADAVERIC CRYOLIFE  FEMORAL-POPLITEAL ARTERY SIZE: OUTER DIAMETER: 7MM - 6MM;  Surgeon: Chadwick Lozano;  Location:  OR    BYPASS GRAFT FEMOROPOPLITEAL Right 5/26/2023    Procedure: RIGHT DISTAL SUPERFICIAL FEMORAL GRAFT TO ANTERIOR TIBIAL ARTERY WITH 26 CENTIMETER CADAVERIC SUPERFICIAL FEMORAL ARTERY GRAFT;  Surgeon: Chadwick Lozano MD;  Location:  OR    BYPASS GRAFT FEMOROPOPLITEAL Right 10/11/2023    Procedure: RIGHT FEMORAL TO POPLITEAL GRAFT THROMBECTOMY;  Surgeon: Chadwick Lozano MD;  Location:  OR    BYPASS GRAFT INSITU FEMOROPOPLITEAL Right 7/7/2021    Procedure: CREATION RIGHT FEMORAL TO POPLITEAL ARTERIAL BYPASS USING INSITU VEIN GRAFT;  Surgeon: José Luis Hernandez MD;  Location:  OR    CARDIAC CATHERIZATION  09/03/2009    multivessel CAD without target lesions, med mgmt  indicated, preserved ef    CARPAL TUNNEL RELEASE RT/LT Right 05/20/2021    COLONOSCOPY N/A 12/12/2023    Procedure: Colonoscopy;  Surgeon: Corey Hoffman MD;  Location:  GI    COLONOSCOPY N/A 12/14/2023    Procedure: Colonoscopy;  Surgeon: Corey Hoffman MD;  Location:  GI    CV CORONARY ANGIOGRAM N/A 10/4/2023    Procedure: Coronary Angiogram;  Surgeon: Rogelio Ricks MD;  Location:  HEART CARDIAC CATH LAB    CV PCI N/A 10/4/2023    Procedure: Percutaneous Coronary Intervention;  Surgeon: Rogelio Ricks MD;  Location:  HEART CARDIAC CATH LAB    EMBOLECTOMY LOWER EXTREMITY Right 10/6/2023    Procedure: Right anterior tibial bypass with graft, Right tibial endarterectomy,thrombectomy, Right doraslis pedis thrombectomy, Anterior Tibial vein patch.;  Surgeon: Chadwick Lozano MD;  Location:  OR    ENDARTERECTOMY CAROTID Right 05/11/2016    Procedure: ENDARTERECTOMY CAROTID;  Surgeon: Chadwick Lozano MD;  Location:  OR    ENDARTERECTOMY CAROTID Left 06/08/2020    Procedure: LEFT CAROTID ENDARTERECTOMY with distal facal vein patch  and EEG;  Surgeon: Chadwick Lozano MD;  Location:  OR    ESOPHAGOSCOPY, GASTROSCOPY, DUODENOSCOPY (EGD), COMBINED N/A 12/12/2023    Procedure: Esophagoscopy, gastroscopy, duodenoscopy (EGD), combined;  Surgeon: Corey Hoffman MD;  Location:  GI    FINE NEEDLE ASPIRATION WITHOUT IMAGING GUIDANCE Right 9/22/2023    Procedure: Fine needle aspiration without imaging guidance;  Surgeon: Kiersten Aguilera MD;  Location:  GI    FLUORESCENCE INTRAOPERATIVE VASCULAR ANGIOGRAPHY Right 12/28/2022    Procedure: RIGHT LEG ANGIOGRAM with intervention;  Surgeon: Chadwick Lozano MD;  Location:  OR    GYN SURGERY  left tube    left salpingectomy    IR ANGIOGRAM THROUGH CATHETER (ARTERIAL)  10/6/2023    IR ANGIOGRAM THROUGH CATHETER (ARTERIAL)  10/6/2023    IR ANGIOGRAM THROUGH CATHETER (ARTERIAL)  3/31/2024    IR  ANGIOGRAM THROUGH CATHETER (ARTERIAL)  3/30/2024    IR CHEST TUBE PLACEMENT NON-TUNNELLED LEFT  6/23/2024    IR CVC TUNNEL PLACEMENT > 5 YRS OF AGE  4/3/2024    IR CVC TUNNEL PLACEMENT > 5 YRS OF AGE  6/23/2024    IR CVC TUNNEL REMOVAL RIGHT  5/28/2024    IR CVC TUNNEL REMOVAL RIGHT  7/3/2024    IR CVC TUNNEL REVISION RIGHT  4/15/2024    IR LOWER EXTREMITY ANGIOGRAM RIGHT  05/25/2021    IR LOWER EXTREMITY ANGIOGRAM RIGHT  10/5/2023    IR LOWER EXTREMITY ANGIOGRAM RIGHT  3/29/2024    IR OR ANGIOGRAM  6/23/2021    IR OR ANGIOGRAM  12/28/2022    IRRIGATION AND DEBRIDEMENT LOWER EXTREMITY, COMBINED Right 5/16/2024    Procedure: IRRIGATION AND DEBRIDEMENT OF RIGHT ABOVE KNEE AMPUTATION;  Surgeon: Tay Enciso MD;  Location:  OR    IRRIGATION AND DEBRIDEMENT LOWER EXTREMITY, COMBINED Right 5/20/2024    Procedure: IRRIGATION AND DEBRIDMENT RIGHT LOWER EXTREMITY;  Surgeon: José Luis Hernandez MD;  Location:  OR    LAPAROSCOPIC CHOLECYSTECTOMY N/A 09/27/2017    Procedure: LAPAROSCOPIC CHOLECYSTECTOMY;  LAPAROSCOPIC CHOLECYSTECTOMY;  Surgeon: Jacoby Tapia MD;  Location:  [facility]    LAPAROSCOPY DIAGNOSTIC (GENERAL) N/A 8/11/2021    Procedure: Exploratory laparoscopy,  laparoscopic lysis of adhesions, laparotomy;  Surgeon: Corina Ferreira MD;  Location:  OR    OR ANGIOGRAM, LOWER EXTREMITY Right 10/11/2023    Procedure: Or Angiogram, Lower Extremity;  Surgeon: Chadwick Lozano MD;  Location:  OR    ORTHOPEDIC SURGERY      left knee surgery    REPAIR ANEURYSM FEMORAL ARTERY Left 12/28/2022    Procedure: REPAIR LEFT FEMORAL PSEUDOANEURYSM;  Surgeon: Chadwick Lozano MD;  Location:  OR    VASCULAR SURGERY  aoto bi fem  left fem-AK pop    [email or web address] FABRIC WRAPPING OF ABDOMINAL ANEURYSM      [facility] [[email or web address] NONSPECIFIC PROCEDURE  12/2000    angioplasty with stent right fem. a.    [facility] [[email or web address] NONSPECIFIC PROCEDURE  1987    sinus surgery    [facility] [[email or web address]] NONSPECIFIC PROCEDURE  09/02/2009    Emergent left groin exploration with  oversewing of bleeding angiographic site. 2. Endarterectomy of common femoral-proximal superficial femoral artery with greater saphenous vein patch angioplasty.   a. [place] of accessory right greater saphenous vein.     [facility] NONSPECIFIC PROCEDURE  08/27/2009    occluded left common iliac and external iliac arteries were successfully revascularized with stenting to 8 and 7 mm             Allergies:     Allergies   Allergen Reactions    Contrast Dye Anaphylaxis     Pt reported facial and throat swelling with prior CT contrast    Pantoprazole      Protonix caused diffuse edema    Chantix [Varenicline]      Terrible dreams    Gluten Meal GI Disturbance     Pt has celiac disease    Penicillins Itching and Rash             Outpatient Medications:     Current Facility-Administered Medications   Medication Dose Route Frequency Provider Last Rate Last Admin    acetaminophen (TYLENOL) tablet 1,000 mg  1,000 mg Oral Q6H PRN Marcin White MD   1,000 mg at 11/26/24 2055    acetaminophen (TYLENOL) tablet 650 mg  650 mg Oral Q4H PRN Rogelio Ricks MD        albuterol (PROVENTIL HFA/VENTOLIN HFA) inhaler  2 puff Inhalation Q4H PRN Marcin White MD        amLODIPine (NORVASC) tablet 10 mg  10 mg Oral Daily Marcin White MD   10 mg at 11/27/24 0805    apixaban ANTICOAGULANT (ELIQUIS) tablet 2.5 mg  2.5 mg Oral BID Yoli Huizar MD   2.5 mg at 11/24/24 1012    atropine injection 0.5 mg  0.5 mg Intravenous Once PRN Rogelio Ricks MD        calcium carbonate (TUMS) chewable tablet 1,000 mg  1,000 mg Oral 4x Daily PRN Marcin White MD        carboxymethylcellulose PF (REFRESH PLUS) 0.5 % ophthalmic solution 1 drop  1 drop Both Eyes Q1H PRN Marcin White MD        carvedilol (COREG) tablet 25 mg  25 mg Oral BID w/meals Lisa Zeng PA-C   25 mg at 11/27/24 0804    chlorthalidone (HYGROTON) tablet 25 mg  25 mg Oral Daily Lisa Zeng PA-C   25 mg at 11/27/24 0805    clopidogrel (PLAVIX) tablet 75 mg  75 mg  Oral Daily Marcin White MD   75 mg at 11/27/24 0805    diphenhydrAMINE-zinc acetate (BENADRYL) 2-0.1 % cream   Topical TID PRN Carlos Hunter MD        fentaNYL (PF) (SUBLIMAZE) injection 25 mcg  25 mcg Intravenous Q15 Min PRN Rogelio Ricks MD        flumazenil (ROMAZICON) injection 0.2 mg  0.2 mg Intravenous q1 min prn Rogelio Ricks MD        fluticasone (FLONASE) 50 MCG/ACT spray 1 spray  1 spray Both Nostrils Daily Liane Huertas MD   1 spray at 11/27/24 0805    fluticasone-vilanterol (BREO ELLIPTA) 200-25 MCG/ACT inhaler 1 puff  1 puff Inhalation Daily Marcin White MD   1 puff at 11/27/24 0806    gabapentin (NEURONTIN) capsule 200 mg  200 mg Oral At Bedtime Marcin White MD   200 mg at 11/26/24 2055    heparin 25,000 units in 0.45% NaCl 250 mL ANTICOAGULANT infusion  0-5,000 Units/hr Intravenous Continuous Fish Chacko MD   Stopped at 11/27/24 1319    HOLD:  Metformin and metformin containing medications if patient received IV contrast with acute kidney injury or severe chronic kidney disease (stage IV or stage V; i.e., eGFR less than 30)   Does not apply HOLD Rogelio Ricks MD        hydrALAZINE (APRESOLINE) injection 10 mg  10 mg Intravenous Q4H PRN Carlos Hunter MD   10 mg at 11/27/24 0612    hydrALAZINE (APRESOLINE) tablet 100 mg  100 mg Oral 4x Daily Lisa Zeng PA-C   100 mg at 11/27/24 1215    ipratropium - albuterol 0.5 mg/2.5 mg/3 mL (DUONEB) neb solution 3 mL  3 mL Nebulization 4x daily Eli Peraza MD   3 mL at 11/27/24 1102    isosorbide mononitrate (IMDUR) 24 hr tablet 120 mg  120 mg Oral Daily Lisa Zeng PA-C   120 mg at 11/27/24 0804    levalbuterol (XOPENEX) neb solution 1.25 mg  1.25 mg Nebulization Q6H PRN Yoli Huizar MD   1.25 mg at 11/24/24 1301    lidocaine (LMX4) cream   Topical Q1H PRN Marcin White MD        lidocaine 1 % 0.1-1 mL  0.1-1 mL Other Q1H PRN Marcin White MD        midazolam (VERSED) injection 0.5 mg  0.5 mg  Intravenous Q5 Min PRN Rogelio Ricks MD        naloxone (NARCAN) injection 0.2 mg  0.2 mg Intravenous Q2 Min PRN Rogelio Ricks MD        Or    naloxone (NARCAN) injection 0.4 mg  0.4 mg Intravenous Q2 Min PRN Rogelio Ricks MD        Or    naloxone (NARCAN) injection 0.2 mg  0.2 mg Intramuscular Q2 Min PRN Rogelio Ricks MD        Or    naloxone (NARCAN) injection 0.4 mg  0.4 mg Intramuscular Q2 Min PRN Rogelio Ricks MD        nicotine (NICODERM CQ) 21 MG/24HR 24 hr patch 1 patch  1 patch Transdermal Daily Carlos Hunter MD   1 patch at 11/27/24 0806    Followed by    [START ON 1/2/2025] nicotine (NICODERM CQ) 14 MG/24HR 24 hr patch 1 patch  1 patch Transdermal Daily Carlos Hunter MD        Followed by    [START ON 1/30/2025] nicotine (NICODERM CQ) 7 MG/24HR 24 hr patch 1 patch  1 patch Transdermal Daily Carlos Hunter MD        nitroGLYcerin (NITROSTAT) sublingual tablet 0.4 mg  0.4 mg Sublingual Q5 Min PRN Marcin White MD   0.4 mg at 11/20/24 2119    No lozenges or gum should be given while patient on BIPAP/AVAPS/AVAPS AE   Does not apply Continuous PRN Marcin White MD        oxyCODONE (ROXICODONE) tablet 5 mg  5 mg Oral Q4H PRN Rogelio Ricks MD        Or    oxyCODONE (ROXICODONE) tablet 10 mg  10 mg Oral Q4H PRN Rogelio Ricks MD        Patient may continue current oral medications   Does not apply Continuous PRN Marcin White MD        predniSONE (DELTASONE) tablet 40 mg  40 mg Oral Daily Eli Peraza MD   40 mg at 11/27/24 0805    prochlorperazine (COMPAZINE) injection 5 mg  5 mg Intravenous Q6H PRN Marcin White MD        Or    prochlorperazine (COMPAZINE) tablet 5 mg  5 mg Oral Q6H PRN Marcin White MD        rosuvastatin (CRESTOR) tablet 10 mg  10 mg Oral At Bedtime Marcin White MD   10 mg at 11/26/24 2055    senna-docusate (SENOKOT-S/PERICOLACE) 8.6-50 MG per tablet 1 tablet  1 tablet Oral BID PRN Marcin White MD        Or     "senna-docusate (SENOKOT-S/PERICOLACE) 8.6-50 MG per tablet 2 tablet  2 tablet Oral BID PRN Marcin White MD        sodium chloride (OCEAN) 0.65 % nasal spray 1 spray  1 spray Both Nostrils BID PRN Liane Huertas MD        sodium chloride (PF) 0.9% PF flush 3 mL  3 mL Intracatheter Q8H Marcin White MD   3 mL at 11/26/24 0342    sodium chloride (PF) 0.9% PF flush 3 mL  3 mL Intracatheter q1 min prn Marcin White MD        valsartan (DIOVAN) tablet 40 mg  40 mg Oral Daily Lisa Zeng, PATabithaC                 Family History:     Family History   Problem Relation Age of Onset    Cancer Mother         bladder    Cardiovascular Father         alive,multiple heart attacks    Diabetes Maternal Grandmother     Lung Cancer Maternal Grandmother     Blood Disease Brother         clotting disorder               Social History:     Social History     Tobacco Use    Smoking status: Former     Current packs/day: 0.25     Average packs/day: 0.3 packs/day for 56.9 years (14.2 ttl pk-yrs)     Types: Cigarettes     Start date: 1968    Smokeless tobacco: Never    Tobacco comments:     1/2 PPD. 1-3 cigs a day   Vaping Use    Vaping status: Never Used   Substance Use Topics    Alcohol use: Not Currently     Comment: x1-2 yr    Drug use: Yes     Types: Marijuana     Comment: 2x per day             Physical Exam:   BP (!) 168/74   Pulse 61   Temp 98.2  F (36.8  C) (Oral)   Resp 12   Ht 1.575 m (5' 2\")   Wt 47.5 kg (104 lb 11.5 oz)   LMP  (LMP Unknown)   SpO2 94%   BMI 19.15 kg/m      General: thin female in no acute distress, on HFNC   HENT: external ears without visible abnormalities, no rhinorrhea, no epistaxis  Lungs: fair air flow b/l, no wheezes, rales, rhonchi, diminished in bases b/l  Heart: RRR, holosystolic murmu  Extremities: no pitting edema, R BKA, no clubbing or cyanosis  Skin: no visible rashes, no mottling  Neurologic: moving all 4 extremities spontaneously, awake and alert          Data:   Labs (all laboratory " studies reviewed by me): notable labs in HPI above.    Imaging and other diagnostic testing (all imaging studies reviewed by me)    Chest xray - 11/23/24 -   NDICATION: Hypoxia.  COMPARISON: None.                                       IMPRESSION: Heart size is enlarged and there is moderate bilateral pulmonary edema with small bilateral pleural effusions. Findings are compatible with congestive heart failure. Overlying infectious process involving the lung bases is not excluded.       CT chest w/o contrast - 11/21/24 -   FINDINGS:   LUNGS AND PLEURA: Decreased small to moderate right pleural effusion  with adjacent atelectasis. New patchy consolidative opacity within the  right middle lobe and lower lobe with air bronchograms. Similar  moderate left pleural effusion with complete atelectasis of the left  lower lobe. No pneumothorax. Mild interstitial pulmonary edema.     MEDIASTINUM/AXILLAE: Prominent mediastinal lymph nodes may be  reactive. No thoracic aortic aneurysm. Hypodense thyroid nodules  measuring up to 1.1 cm. Trace pericardial fluid.     CORONARY ARTERY CALCIFICATION: Severe.     UPPER ABDOMEN: Trace perisplenic ascites. Cholecystectomy.     MUSCULOSKELETAL: No aggressive osseous lesion. Remote left rib  fracture.                                                                      IMPRESSION:   1.  Small to moderate right pleural effusion with adjacent  atelectasis. New patchy consolidative opacity within the right middle  lobe and lower lobe concerning for superimposed pneumonia with  possible component of reexpansion edema.  2.  Moderate left pleural effusion with complete atelectasis of the  left lower lobe.  3.  Mild interstitial pulmonary edema.    TTE - 11/22/24 -   Interpretation Summary     The left ventricle is normal in size.  Left ventricular systolic function is normal.  The visual ejection fraction is 55-60%.  Normal left ventricular wall motion  The right ventricle is normal in structure,  function and size.  There is mild (1+) mitral regurgitation.  There is mild (1+) tricuspid regurgitation.  Right ventricular systolic pressure is elevated, consistent with mild  pulmonary hypertension.  Compared to prior study, there is no significant change.

## 2024-11-28 VITALS
HEART RATE: 56 BPM | BODY MASS INDEX: 17.69 KG/M2 | DIASTOLIC BLOOD PRESSURE: 54 MMHG | SYSTOLIC BLOOD PRESSURE: 109 MMHG | OXYGEN SATURATION: 94 % | WEIGHT: 96.12 LBS | TEMPERATURE: 98.3 F | HEIGHT: 62 IN | RESPIRATION RATE: 16 BRPM

## 2024-11-28 LAB
ANION GAP SERPL CALCULATED.3IONS-SCNC: 16 MMOL/L (ref 7–15)
BUN SERPL-MCNC: 97 MG/DL (ref 8–23)
CALCIUM SERPL-MCNC: 8.1 MG/DL (ref 8.8–10.4)
CHLORIDE SERPL-SCNC: 92 MMOL/L (ref 98–107)
CREAT SERPL-MCNC: 2.67 MG/DL (ref 0.51–0.95)
EGFRCR SERPLBLD CKD-EPI 2021: 19 ML/MIN/1.73M2
ERYTHROCYTE [DISTWIDTH] IN BLOOD BY AUTOMATED COUNT: 14.8 % (ref 10–15)
GLUCOSE SERPL-MCNC: 80 MG/DL (ref 70–99)
GRAM STAIN RESULT: NORMAL
HCO3 SERPL-SCNC: 24 MMOL/L (ref 22–29)
HCT VFR BLD AUTO: 33.7 % (ref 35–47)
HGB BLD-MCNC: 11.3 G/DL (ref 11.7–15.7)
MCH RBC QN AUTO: 27.7 PG (ref 26.5–33)
MCHC RBC AUTO-ENTMCNC: 33.5 G/DL (ref 31.5–36.5)
MCV RBC AUTO: 83 FL (ref 78–100)
MRSA DNA SPEC QL NAA+PROBE: NEGATIVE
PLATELET # BLD AUTO: 253 10E3/UL (ref 150–450)
POTASSIUM SERPL-SCNC: 4.3 MMOL/L (ref 3.4–5.3)
RBC # BLD AUTO: 4.08 10E6/UL (ref 3.8–5.2)
SA TARGET DNA: NEGATIVE
SODIUM SERPL-SCNC: 132 MMOL/L (ref 135–145)
WBC # BLD AUTO: 8.8 10E3/UL (ref 4–11)

## 2024-11-28 PROCEDURE — 36415 COLL VENOUS BLD VENIPUNCTURE: CPT | Performed by: STUDENT IN AN ORGANIZED HEALTH CARE EDUCATION/TRAINING PROGRAM

## 2024-11-28 PROCEDURE — 120N000001 HC R&B MED SURG/OB

## 2024-11-28 PROCEDURE — 87641 MR-STAPH DNA AMP PROBE: CPT | Performed by: STUDENT IN AN ORGANIZED HEALTH CARE EDUCATION/TRAINING PROGRAM

## 2024-11-28 PROCEDURE — 94640 AIRWAY INHALATION TREATMENT: CPT

## 2024-11-28 PROCEDURE — 99232 SBSQ HOSP IP/OBS MODERATE 35: CPT | Performed by: INTERNAL MEDICINE

## 2024-11-28 PROCEDURE — 999N000157 HC STATISTIC RCP TIME EA 10 MIN

## 2024-11-28 PROCEDURE — 94640 AIRWAY INHALATION TREATMENT: CPT | Mod: 76

## 2024-11-28 PROCEDURE — 85014 HEMATOCRIT: CPT | Performed by: STUDENT IN AN ORGANIZED HEALTH CARE EDUCATION/TRAINING PROGRAM

## 2024-11-28 PROCEDURE — 250N000013 HC RX MED GY IP 250 OP 250 PS 637: Performed by: STUDENT IN AN ORGANIZED HEALTH CARE EDUCATION/TRAINING PROGRAM

## 2024-11-28 PROCEDURE — 250N000013 HC RX MED GY IP 250 OP 250 PS 637: Performed by: INTERNAL MEDICINE

## 2024-11-28 PROCEDURE — 99232 SBSQ HOSP IP/OBS MODERATE 35: CPT | Performed by: STUDENT IN AN ORGANIZED HEALTH CARE EDUCATION/TRAINING PROGRAM

## 2024-11-28 PROCEDURE — 250N000012 HC RX MED GY IP 250 OP 636 PS 637: Performed by: INTERNAL MEDICINE

## 2024-11-28 PROCEDURE — 258N000003 HC RX IP 258 OP 636: Performed by: STUDENT IN AN ORGANIZED HEALTH CARE EDUCATION/TRAINING PROGRAM

## 2024-11-28 PROCEDURE — 250N000009 HC RX 250: Performed by: INTERNAL MEDICINE

## 2024-11-28 PROCEDURE — 250N000013 HC RX MED GY IP 250 OP 250 PS 637: Performed by: PHYSICIAN ASSISTANT

## 2024-11-28 PROCEDURE — 87205 SMEAR GRAM STAIN: CPT | Performed by: INTERNAL MEDICINE

## 2024-11-28 PROCEDURE — 80048 BASIC METABOLIC PNL TOTAL CA: CPT | Performed by: STUDENT IN AN ORGANIZED HEALTH CARE EDUCATION/TRAINING PROGRAM

## 2024-11-28 PROCEDURE — 87070 CULTURE OTHR SPECIMN AEROBIC: CPT | Performed by: INTERNAL MEDICINE

## 2024-11-28 PROCEDURE — 85041 AUTOMATED RBC COUNT: CPT | Performed by: STUDENT IN AN ORGANIZED HEALTH CARE EDUCATION/TRAINING PROGRAM

## 2024-11-28 RX ORDER — POLYETHYLENE GLYCOL 3350 17 G/17G
17 POWDER, FOR SOLUTION ORAL DAILY
Status: DISCONTINUED | OUTPATIENT
Start: 2024-11-28 | End: 2024-12-13 | Stop reason: HOSPADM

## 2024-11-28 RX ORDER — NALOXONE HYDROCHLORIDE 0.4 MG/ML
0.2 INJECTION, SOLUTION INTRAMUSCULAR; INTRAVENOUS; SUBCUTANEOUS
Status: DISCONTINUED | OUTPATIENT
Start: 2024-11-28 | End: 2024-12-13 | Stop reason: HOSPADM

## 2024-11-28 RX ORDER — HYDRALAZINE HYDROCHLORIDE 50 MG/1
100 TABLET, FILM COATED ORAL EVERY 8 HOURS PRN
Status: DISCONTINUED | OUTPATIENT
Start: 2024-11-28 | End: 2024-12-13 | Stop reason: HOSPADM

## 2024-11-28 RX ORDER — CARVEDILOL 12.5 MG/1
12.5 TABLET ORAL 2 TIMES DAILY WITH MEALS
Status: DISCONTINUED | OUTPATIENT
Start: 2024-11-28 | End: 2024-12-13 | Stop reason: HOSPADM

## 2024-11-28 RX ORDER — NALOXONE HYDROCHLORIDE 0.4 MG/ML
0.4 INJECTION, SOLUTION INTRAMUSCULAR; INTRAVENOUS; SUBCUTANEOUS
Status: DISCONTINUED | OUTPATIENT
Start: 2024-11-28 | End: 2024-12-13 | Stop reason: HOSPADM

## 2024-11-28 RX ORDER — SODIUM CHLORIDE 9 MG/ML
INJECTION, SOLUTION INTRAVENOUS CONTINUOUS
Status: ACTIVE | OUTPATIENT
Start: 2024-11-28 | End: 2024-11-28

## 2024-11-28 RX ADMIN — IPRATROPIUM BROMIDE AND ALBUTEROL SULFATE 3 ML: .5; 3 SOLUTION RESPIRATORY (INHALATION) at 19:50

## 2024-11-28 RX ADMIN — IPRATROPIUM BROMIDE AND ALBUTEROL SULFATE 3 ML: .5; 3 SOLUTION RESPIRATORY (INHALATION) at 07:49

## 2024-11-28 RX ADMIN — CLOPIDOGREL BISULFATE 75 MG: 75 TABLET ORAL at 08:09

## 2024-11-28 RX ADMIN — SODIUM CHLORIDE: 9 INJECTION, SOLUTION INTRAVENOUS at 14:29

## 2024-11-28 RX ADMIN — AMLODIPINE BESYLATE 10 MG: 10 TABLET ORAL at 08:09

## 2024-11-28 RX ADMIN — NICOTINE 1 PATCH: 21 PATCH, EXTENDED RELEASE TRANSDERMAL at 08:09

## 2024-11-28 RX ADMIN — GABAPENTIN 200 MG: 100 CAPSULE ORAL at 21:53

## 2024-11-28 RX ADMIN — FLUTICASONE PROPIONATE 1 SPRAY: 50 SPRAY, METERED NASAL at 08:14

## 2024-11-28 RX ADMIN — HYDRALAZINE HYDROCHLORIDE 100 MG: 50 TABLET ORAL at 08:09

## 2024-11-28 RX ADMIN — ACETAMINOPHEN 650 MG: 325 TABLET, FILM COATED ORAL at 21:53

## 2024-11-28 RX ADMIN — FLUTICASONE FUROATE AND VILANTEROL TRIFENATATE 1 PUFF: 200; 25 POWDER RESPIRATORY (INHALATION) at 08:14

## 2024-11-28 RX ADMIN — PREDNISONE 40 MG: 20 TABLET ORAL at 08:09

## 2024-11-28 RX ADMIN — IPRATROPIUM BROMIDE AND ALBUTEROL SULFATE 3 ML: .5; 3 SOLUTION RESPIRATORY (INHALATION) at 10:48

## 2024-11-28 RX ADMIN — CHLORTHALIDONE 25 MG: 25 TABLET ORAL at 08:09

## 2024-11-28 RX ADMIN — APIXABAN 2.5 MG: 2.5 TABLET, FILM COATED ORAL at 06:41

## 2024-11-28 RX ADMIN — POLYETHYLENE GLYCOL 3350 17 G: 17 POWDER, FOR SOLUTION ORAL at 13:07

## 2024-11-28 RX ADMIN — APIXABAN 2.5 MG: 2.5 TABLET, FILM COATED ORAL at 16:54

## 2024-11-28 RX ADMIN — ROSUVASTATIN CALCIUM 10 MG: 10 TABLET, FILM COATED ORAL at 21:54

## 2024-11-28 RX ADMIN — VALSARTAN 40 MG: 40 TABLET, FILM COATED ORAL at 08:28

## 2024-11-28 RX ADMIN — ISOSORBIDE MONONITRATE 120 MG: 60 TABLET, EXTENDED RELEASE ORAL at 08:09

## 2024-11-28 ASSESSMENT — ACTIVITIES OF DAILY LIVING (ADL)
ADLS_ACUITY_SCORE: 49
ADLS_ACUITY_SCORE: 45
ADLS_ACUITY_SCORE: 45
ADLS_ACUITY_SCORE: 48
ADLS_ACUITY_SCORE: 45
ADLS_ACUITY_SCORE: 49
ADLS_ACUITY_SCORE: 48
ADLS_ACUITY_SCORE: 49
ADLS_ACUITY_SCORE: 58
ADLS_ACUITY_SCORE: 49
ADLS_ACUITY_SCORE: 49
ADLS_ACUITY_SCORE: 48
ADLS_ACUITY_SCORE: 49
ADLS_ACUITY_SCORE: 48
ADLS_ACUITY_SCORE: 58
ADLS_ACUITY_SCORE: 49

## 2024-11-28 NOTE — PROVIDER NOTIFICATION
MD Notification    Notified Person: MD    Notified Person Name:  Randal Paulino    Notification Date/Time: 11/28/24 @ 0438    Notification Interaction: Vocera page    Purpose of Notification: Notified provider that pt's HR between 45-50bpm, pt alert and oriented once awoken.     Orders Received: Continue to monitor closely     Comments:

## 2024-11-28 NOTE — PLAN OF CARE
"Patient Name: Larry  MRN: [id number]  Date of Admission: 11/20/2024  Reason for Admission: SOB, bilateral pleural effusions, RF, HF exacerbation  Level of Care: [facility]    [id number]    Vitals:   BP Readings from Last 1 Encounters:   11/28/24 126/73     Pulse Readings from Last 1 Encounters:   11/28/24 (!) 46   ^overnight hospitalist aware, see provider notification note    Wt Readings from Last 1 Encounters:   11/27/24 47.5 kg (104 lb 11.5 oz)     Ht Readings from Last 1 Encounters:   11/20/24 1.575 m (5' 2\")     Estimated body mass index is 19.15 kg/m  as calculated from the following:    Height as of this encounter: 1.575 m (5' 2\").    Weight as of this encounter: 47.5 kg (104 lb 11.5 oz).  Temp Readings from Last 1 Encounters:   11/27/24 98.2  F (36.8  C) (Oral)       Pain: Pain goal 2/10 Pain Rating 7/10 Effective pain medication/regimen PRN tylenol    CV Surgery Patient: No    Assessment    Resp: LS clear/dim with expiratory wheezes, on 2L NC. Denies SOB  Telemetry: SB  Neuro: A&Ox4, CMS intact  GI/: Tolerating 2g sodium diet. 1800ml fluid restriction. +BS, +flatus, -BM this shift. Low UO using purewick.   Skin/Wounds: R internal jugular access site dressing CDI. R BKA. Blanchable redness to coccyx. Scattered bruising.   Lines/Drains: R PIV SL  Activity: A2 lift, pt frequently turns self in bed.   Sleep: 7 hours  Abnormal Labs: Morning labs pending    Aggression Stop Light: Green          Patient Care Plan: Sputum culture contaminated, needs to be reordered and recollected. Wean O2 as able, encourage pulm hygiene.   "

## 2024-11-28 NOTE — PROGRESS NOTES
[facility]    Medicine Progress Note - Hospitalist Service    Date of Admission:  11/20/2024    Assessment & Plan   Shirley Hendricks is a 64 year old female, on Plavix, with a history of COPD, hypertension, CHF, peripheral artery disease, NSTEMI, stage 4 CKD, and tobacco use who is being admitted for evaluation of shortness of breath.      Acute hypoxic respiratory failure   Acute on chronic diastolic heart failure in exacerbation:  Moderate to severe left pleural effusion status post thoracentesis  Type II NSTEMI likely due to demand due to heart failure exacerbation  # Community Acquired Pnuemonia  Assessment: Presents with 2-day history of increasing shortness of breath and a cough.  On admission chest x-ray shows a large left pleural effusion with compressive atelectasis.  There is also diffuse interstitial opacities consistent with pulmonary edema.  No pneumothorax was noted.  Labs are pertinent for a proBNP of 26,004-20, mildly elevated point of 98, creatinine 1.71. PTA on Norvasc 10 mg daily, atenolol 25 mg daily, torsemide 50 mg daily, Jardiance 10 mg daily  Plan:  - - Ultrasound guided thoracentesis on 11/20/24 s/p .1 liters of straw-colored fluid were removed   Repeat -CT showed bilateral pleural effusions and repeat Thoracentesis ordered  -Keep oxygen saturation above 90%  -Duplex on 11/21/24 no DVT  --Patient refused right sided thoracentesis and left sided 1200ml drained yellow colored fluid was drained on 11/21/24 .  Lights criteria consistent with transudate.  Cultures negative  Right-sided thoracentesis ordered and 1.5 l fluid removed on 11/22/24  --Solu-Medrol changed to prednisone on 11/24 for a total of 5 days  -TE done during this hospitalization on 11/22/2024 showed LVEF normal at 55 to 60%.  RV function normal.  Mild 1+ MR, 1+ TR.  Mild pulmonary hypertension.  Compared to prior study there is no significant change     -Completed treatment for community-acquired  pneumonia with cefuroxime and azithromycin  Hydralazine  100 mg 4 times a day  -Continue isosorbide 120 mg  -Monitor electrolytes and potassium while on diuresis  --Keep oxygen saturations above 80%  -Diuretic dosing as per cardiology.diuretic holiday 24-48 hours, -Right heart catheterization done on 11/27/24 showed normal pressures  -Continue DuoNebs  -Pulmonology consult appreciated  -Weaned off High flow on RA  -Apixaban 2.5mg bid       Addendum-  -patient pressures have been in the 110s systolic.  -Worsening kidney dysfunction and not making much urine  Ordered normal saline   -Hydralazine made as needed  -Carvedilol decreased to 12.5 mg twice daily  -Chlorthalidone and losartan held due to LIMA  -Continue amlodipine and isosorbide with holding parameters       # Hypertensive emergency  -See above manageemnt    # New onset atrial fibrillation with RVR  -Converted -Sinus bradycardia  -Continue apixaban  -Continue carvedilol  -Pharmacy liaison consult on 11/25/2024-Xarelto/Eliquis $12  per month  -Carvedilol dose decreased due to bradycardia          Hypertension  Hyperlipidemia  Coronary artery disease with history of MI in 2019  Peripheral vascular disease with history of 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 2024  Assessment: PTA on amlodipine 10 mg, Coreg changed to atenolol 25 mg daily, hydralazine 50 mg 3 times daily, Imdur 60 mg daily, Crestor 10 mg  Plan:  -See above for management of her hypertension        # Mild hyponatremia  -Sodium improved from 1   -Continue diuresis         Acute kidney injury  on CKD stage III  Assessment: Creatinine on admission of 1.71, which is within her baseline.1.2-1.4  Plan:  -Daily BMP while on IV diuresis  -Avoid NSAIDs/nephrotoxins  Creatinine improved to 1.89 and patient started on Bumex  -Nephrology consulted  -Nephrology ordered UA urine proteinuria, urine sodium, urea and creatinine  -Avoid ACE inhibitors and mineralocorticoid antagonist afebrile with history of right total  "hip  -Cr worsened to 2.67  -Hold diuresis and chlorthalidone         History of COPD not in exacerbation  - Continue with Breo and as needed albuterol nebs available     GERD  - Continue with PTA famotidine     Anemia of Chronic Disease  Baseline hemoglobin has been between 8-9 over the past few months  Plan:  Hb 11.5 No active bleeding            History of thrombocytopenia  - Platelet count normal on admission     Tobacco use  - Continue with nicotine patch  -Counselled on cessation    # Generalized weakness  -Physical therapy and Occupational Therapy      # Constipation  Bowel regimen                 Diet: Fluid restriction 1800 ML FLUID  Combination Diet Gluten Free Diet; 2 gm NA Diet    DVT Prophylaxis: DOAC  Alvarez Catheter: Not present  Lines: None     Cardiac Monitoring: ACTIVE order. Indication: Post- PCI/Angiogram (24 hours)  Code Status: Full Code      Clinically Significant Risk Factors         # Hyponatremia: Lowest Na = 132 mmol/L in last 2 days, will monitor as appropriate  # Hypochloremia: Lowest Cl = 92 mmol/L in last 2 days, will monitor as appropriate      # Hypoalbuminemia: Lowest albumin = 3 g/dL at 11/21/2024  4:42 AM, will monitor as appropriate     # Hypertension: Noted on problem list            # Cachexia: Estimated body mass index is 17.58 kg/m  as calculated from the following:    Height as of this encounter: 1.575 m (5' 2\").    Weight as of this encounter: 43.6 kg (96 lb 1.9 oz).   # Moderate Malnutrition: based on nutrition assessment    # Financial/Environmental Concerns: none         Social Drivers of Health    Tobacco Use: Medium Risk (11/14/2024)    Received from [employer]    Patient History     Smoking Tobacco Use: Former     Smokeless Tobacco Use: Never     Passive Exposure: Past          Disposition Plan     Medically Ready for Discharge: Anticipated in 2-4 Days             Yoli Huizar MD  Hospitalist Service  [facility]  Securely message with [employer] (more info)  Text page via [facility] Paging/Directory   ______________________________________________________________________    Interval History   Patient seen and examined at bedside.    Feels well.  Denies any chest pain x-ray for shortness of breath.      Patient states she wants to be discharged home she has a [employer] game on Saturday.    I discussed with her that her kidney function has to improve before she can go home.  She also needs to work with physical therapy and Occupational Therapy        Did not have a bowel movement          Physical Exam   Vital Signs: Temp: 97.7  F (36.5  C) Temp src: Oral BP: 116/74 Pulse: 50   Resp: 10 SpO2: 95 % O2 Device: (S) None (Room air) Oxygen Delivery: 1 LPM  Weight: 96 lbs 1.93 oz    Physical Exam  Cardiovascular:      Rate and Rhythm: Normal rate and regular rhythm.      Heart sounds: Normal heart sounds.   Pulmonary:      Effort: Pulmonary effort is normal. No respiratory distress.      Breath sounds: Normal breath sounds.   Abdominal:      General: There is no distension.      Palpations: Abdomen is soft.      Tenderness: There is no abdominal tenderness.   Musculoskeletal:      Left lower leg: No edema.      Comments: Right charles            Medical Decision Making       Data     I have personally reviewed the following data over the past 24 hrs:    8.8  \   11.3 (L)   / 253     132 (L) 92 (L) 97.0 (H) /  80   4.3 24 2.67 (H) \     Procal: N/A CRP: N/A Lactic Acid: <0.3       INR:  N/A PTT:  82 (H)   D-dimer:  N/A Fibrinogen:  N/A       Imaging results reviewed over the past 24 hrs:   Recent Results (from the past 24 hours)   Cardiac Catheterization    Narrative    1) Normal right and left sided filling pressures  2) No evidence of pulmonary hypertension  3) Normal CO/CI

## 2024-11-28 NOTE — PLAN OF CARE
"Patient Name: Larry  MRN: [id number]  Date of Admission: 11/20/2024  Reason for Admission: Acute on chronic heart failure and bilateral pleural effusion.   Level of Care: Acute.     Vitals:   BP Readings from Last 1 Encounters:   11/28/24 116/74     Pulse Readings from Last 1 Encounters:   11/28/24 50     Wt Readings from Last 1 Encounters:   11/28/24 43.6 kg (96 lb 1.9 oz)     Ht Readings from Last 1 Encounters:   11/20/24 1.575 m (5' 2\")     Estimated body mass index is 17.58 kg/m  as calculated from the following:    Height as of this encounter: 1.575 m (5' 2\").    Weight as of this encounter: 43.6 kg (96 lb 1.9 oz).  Temp Readings from Last 1 Encounters:   11/28/24 97.7  F (36.5  C) (Oral)       Pain: Pain goal no pain Pain Rating: denied pain.  Effective pain medication/regimen: no pain meds required.     CV Surgery Patient: No    Assessment    Resp: weaned to RA this am, lungs clear anteriorly. Placed back on 1 liter after 5pm due to keep O2 sat >90%.   Telemetry: SB and soft BP, Coreg adjusted per MD, held parameter in place for hydralazine as well.   Neuro: intact  GI/: Had not voided since 2200 last night except for sm leak around purewick around 1030, bladder scanned 4 ml. MD notified. Miralax given, no BM for 4 days. +BS, +flatus.   Skin/Wounds: intact. Right AKA 4/2024 due to PVD.   Lines/Drains: PIV SL.   Activity: to commode once with minimal assist, pivot from bed.  PT\OT eval ordered, to see. Family is to bring in her own wheelchair today, then she would get out of bed. Declined to use facility wheelchair.   Sleep: naps between cares.   Abnormal Labs: Na 132, Cr. 2.67, GFR 19, diuretic on hold. On Fluid restriction 2000 ml.     Aggression Stop Light: Green          Patient Care Plan: watch Cr. Level. Nephrology following. PT/OT to see in am. Discharge possible over the weekend. Pt felt very disappointed with her setback today.         "

## 2024-11-28 NOTE — PROGRESS NOTES
Renal Medicine Progress Note            Assessment/Plan:     Shirley Hendricks is a 64 year old female who was admitted on 11/20/2024.      Assessment:  1) chronic kidney disease stage IIIb  Followed by [facility] nephrology, Dr. Barboza and DARREL Roberson, last visit 11/14/2024     Noted history LIMA June/July 2024 requiring dialysis, discontinued 6/24     Hyperkalemia last month, spironolactone discontinued with potassium 6.0.  Potassium but normal this admission.     If are worse today, suspect this is related to some renal hypoperfusion.  Likely underlying small vessel disease and having some hemodynamic effects.  Normal filling pressures yesterday and agree with holding diuretics.  Would reduce or hold some antihypertensives with now we will range BPs.     2) hypervolemic state, history of HFpEF: Current regimen include 2 mg IV every 8 hours Bumex, received 2.5 mg metolazone 11/24.  No significant edema on exam, last chest x-ray  with pulmonary edema.     3) hypertension: Currently on amlodipine 10 mg, hydralazine 100 mg 3 times daily, Imdur 120 mg daily, metoprolol 50 g twice daily,  Now with blood pressure 107 this morning.        Plan/Recs:  1) continue to hold diuretics concern with relative hypotension and renal hypoperfusion.  Suggest reducing hydralazine or making as needed for now.  2) Daily BMPs, will follow-up tomorrow    Sean Loving DO  [facility]  Office: [phone]  Cell: [phone]        Interval History:     Patient on room air today.  Noted right heart cath with normal filling pressures.  Cardiology signed out with recs to hold diuretics for a few days.  Discussed her worsening renal function today.          Medications and Allergies:     Current Facility-Administered Medications   Medication Dose Route Frequency Provider Last Rate Last Admin    amLODIPine (NORVASC) tablet 10 mg  10 mg Oral Daily Marcin White MD   10 mg at 11/28/24 0809    apixaban ANTICOAGULANT (ELIQUIS) tablet 2.5  mg  2.5 mg Oral BID Yoli Huizar MD   2.5 mg at 11/28/24 0641    carvedilol (COREG) tablet 12.5 mg  12.5 mg Oral BID w/meals Yoli Huizar MD        [Held by provider] chlorthalidone (HYGROTON) tablet 25 mg  25 mg Oral Daily Lisa Zeng PA-C   25 mg at 11/28/24 0809    clopidogrel (PLAVIX) tablet 75 mg  75 mg Oral Daily Marcin White MD   75 mg at 11/28/24 0809    fluticasone (FLONASE) 50 MCG/ACT spray 1 spray  1 spray Both Nostrils Daily Liane Huertas MD   1 spray at 11/28/24 0814    fluticasone-vilanterol (BREO ELLIPTA) 200-25 MCG/ACT inhaler 1 puff  1 puff Inhalation Daily Marcin White MD   1 puff at 11/28/24 0814    gabapentin (NEURONTIN) capsule 200 mg  200 mg Oral At Bedtime Marcin White MD   200 mg at 11/27/24 2103    hydrALAZINE (APRESOLINE) tablet 100 mg  100 mg Oral 4x Daily Lisa Zeng PA-C   100 mg at 11/28/24 0809    ipratropium - albuterol 0.5 mg/2.5 mg/3 mL (DUONEB) neb solution 3 mL  3 mL Nebulization 4x daily Eli Peraza MD   3 mL at 11/28/24 1048    isosorbide mononitrate (IMDUR) 24 hr tablet 120 mg  120 mg Oral Daily Lisa Zeng PA-C   120 mg at 11/28/24 0809    nicotine (NICODERM CQ) 21 MG/24HR 24 hr patch 1 patch  1 patch Transdermal Daily Carlos Hunter MD   1 patch at 11/28/24 0809    Followed by    [START ON 1/2/2025] nicotine (NICODERM CQ) 14 MG/24HR 24 hr patch 1 patch  1 patch Transdermal Daily Carlos Hunter MD        Followed by    [START ON 1/30/2025] nicotine (NICODERM CQ) 7 MG/24HR 24 hr patch 1 patch  1 patch Transdermal Daily Carlos Hunter MD        polyethylene glycol (MIRALAX) Packet 17 g  17 g Oral Daily Yoli Huizar MD        rosuvastatin (CRESTOR) tablet 10 mg  10 mg Oral At Bedtime Marcin White MD   10 mg at 11/27/24 2104    sodium chloride (PF) 0.9% PF flush 3 mL  3 mL Intracatheter Q8H Marcin White MD   3 mL at 11/27/24 2104    valsartan (DIOVAN) tablet 40 mg  40 mg Oral Daily Lisa Zeng  "COLE BULLOCK   40 mg at 11/28/24 0828        Allergies   Allergen Reactions    Contrast Dye Anaphylaxis     Pt reported facial and throat swelling with prior CT contrast    Pantoprazole      Protonix caused diffuse edema    Chantix [Varenicline]      Terrible dreams    Gluten Meal GI Disturbance     Pt has celiac disease    Penicillins Itching and Rash            Physical Exam:   Vitals were reviewed  /56 (BP Location: Right arm)   Pulse 58   Temp 97.5  F (36.4  C) (Oral)   Resp 18   Ht 1.575 m (5' 2\")   Wt 43.6 kg (96 lb 1.9 oz)   LMP  (LMP Unknown)   SpO2 91%   BMI 17.58 kg/m      Wt Readings from Last 3 Encounters:   11/28/24 43.6 kg (96 lb 1.9 oz)   09/23/24 44.8 kg (98 lb 12.8 oz)   08/15/24 47.2 kg (104 lb)       Intake/Output Summary (Last 24 hours) at 11/28/2024 1300  Last data filed at 11/28/2024 1242  Gross per 24 hour   Intake 2640 ml   Output 900 ml   Net 1740 ml       GENERAL: In no distress  HEENT:  Normocephalic. No gross abnormalities  CV: RRR, 1/6 systolic murmur, no dependent edema or in left lower extremity  No edema noted  RESP: Clear bilaterally with good efforts  GI: Abdomen soft/nt/nd, BS normal.   MUSCULOSKELETAL: Right leg amputation  SKIN: no suspicious lesions or rashes, dry to touch  PSYCH: mood good, affect appropriate           Data:     BMP  Recent Labs   Lab 11/28/24  0640 11/27/24  1045 11/26/24 0312 11/25/24  0535   * 133* 128* 132*   POTASSIUM 4.3 4.1 4.6 4.5   CHLORIDE 92* 92* 92* 96*   SONIA 8.1* 8.6* 8.1* 8.6*   CO2 24 28 23 23   BUN 97.0* 86.4* 94.3* 85.1*   CR 2.67* 1.92* 2.14* 2.00*   GLC 80 93 116* 90     CBC  Recent Labs   Lab 11/28/24  0640 11/27/24  1045 11/26/24  0312 11/25/24  0535   WBC 8.8 7.6 7.3 8.1   HGB 11.3* 11.2* 10.8* 11.4*   HCT 33.7* 34.6* 33.2* 35.6   MCV 83 83 83 83    278 272 277     Lab Results   Component Value Date    AST 16 11/21/2024    ALT 15 11/21/2024    ALKPHOS 66 11/21/2024    BILITOTAL 0.2 11/21/2024    BILICONJ 0.0 " 01/23/2004     Lab Results   Component Value Date    INR 1.31 (H) 04/11/2024       Attestation:  I have reviewed today's vital signs, notes, medications, labs and imaging.    DO [facility] - Nephrology  Office: [phone]  Cell: [phone]

## 2024-11-28 NOTE — PROGRESS NOTES
This is a shared visit note with the BRANDON Lisa Zeng dated today, 11/27.    I personally reviewed the vital signs, medications, labs, and imaging.  I personally provided a substantive portion of care for this patient and I approve the care plan as written by the BRANDON.      I was involved with Medical Decision Making including:   Patient underwent an uncomplicated right heart station via right internal jugular venous access.  Reassuring findings.  Normal LV filling pressures, normal cardiac index, normal attention.  Patient is comfortably lying in bed, alert and oriented.  She still remains hypertensive at 154/74 with a pulse of 60 bpm.  Remains on high flow oxygen.    Right heart catheterization ruled out heart failure and pulmonary hypertension.  There is a long tobacco use history - started smoking at age 8 years with significant secondhand exposure from both her parents.  Occasionally still smokes cigarettes.  I suspect the reason for her hypoxemia is severe COPD.  Defer to pulmonology.  Stop IV bumetanide, diuretic holiday 24-48 hours, subsequently start oral bumetanide at 2 mg 3 times daily (8 AM and 2 PM).  For resistant hypertension - continue chlorthalidone 25 mg daily, enalapril 10 daily, carvedilol 25 mg 2 times daily, hydralazine 100 mg daily, Imdur 120 mg daily, and start valsartan 40 mg today (and uptitrate to twice daily and further as allowed by renal function.  Eliquis and clopidogrel for CAD and PAD.  Patient's RN updated.  Cardiology will sign off.  Outpatient follow-up will be scheduled.  Thank you.    Please see A&P for additional details of medical decision making and follow up.  40 minutes spent by me on the date of service doing chart review, history, exam, documentation & further activities per the note.  BRANDON time separate.    Sandra Garcia MD  Date of Service (when I saw the patient): 11/27/2024

## 2024-11-29 ENCOUNTER — APPOINTMENT (OUTPATIENT)
Dept: OCCUPATIONAL THERAPY | Facility: CLINIC | Age: 64
DRG: 280 | End: 2024-11-29
Attending: STUDENT IN AN ORGANIZED HEALTH CARE EDUCATION/TRAINING PROGRAM
Payer: COMMERCIAL

## 2024-11-29 LAB
ANION GAP SERPL CALCULATED.3IONS-SCNC: 17 MMOL/L (ref 7–15)
BUN SERPL-MCNC: 116 MG/DL (ref 8–23)
CALCIUM SERPL-MCNC: 7.4 MG/DL (ref 8.8–10.4)
CHLORIDE SERPL-SCNC: 90 MMOL/L (ref 98–107)
CREAT SERPL-MCNC: 3.41 MG/DL (ref 0.51–0.95)
EGFRCR SERPLBLD CKD-EPI 2021: 14 ML/MIN/1.73M2
ERYTHROCYTE [DISTWIDTH] IN BLOOD BY AUTOMATED COUNT: 14.8 % (ref 10–15)
GLUCOSE SERPL-MCNC: 88 MG/DL (ref 70–99)
HCO3 SERPL-SCNC: 21 MMOL/L (ref 22–29)
HCT VFR BLD AUTO: 33 % (ref 35–47)
HGB BLD-MCNC: 10.9 G/DL (ref 11.7–15.7)
LACTATE SERPL-SCNC: 1.2 MMOL/L (ref 0.7–2)
MCH RBC QN AUTO: 27.4 PG (ref 26.5–33)
MCHC RBC AUTO-ENTMCNC: 33 G/DL (ref 31.5–36.5)
MCV RBC AUTO: 83 FL (ref 78–100)
PLATELET # BLD AUTO: 234 10E3/UL (ref 150–450)
POTASSIUM SERPL-SCNC: 5 MMOL/L (ref 3.4–5.3)
RBC # BLD AUTO: 3.98 10E6/UL (ref 3.8–5.2)
SODIUM SERPL-SCNC: 128 MMOL/L (ref 135–145)
WBC # BLD AUTO: 10.4 10E3/UL (ref 4–11)

## 2024-11-29 PROCEDURE — 250N000013 HC RX MED GY IP 250 OP 250 PS 637: Performed by: STUDENT IN AN ORGANIZED HEALTH CARE EDUCATION/TRAINING PROGRAM

## 2024-11-29 PROCEDURE — 83605 ASSAY OF LACTIC ACID: CPT

## 2024-11-29 PROCEDURE — 36415 COLL VENOUS BLD VENIPUNCTURE: CPT

## 2024-11-29 PROCEDURE — 250N000013 HC RX MED GY IP 250 OP 250 PS 637: Performed by: INTERNAL MEDICINE

## 2024-11-29 PROCEDURE — 85018 HEMOGLOBIN: CPT | Performed by: STUDENT IN AN ORGANIZED HEALTH CARE EDUCATION/TRAINING PROGRAM

## 2024-11-29 PROCEDURE — 97535 SELF CARE MNGMENT TRAINING: CPT | Mod: GO | Performed by: OCCUPATIONAL THERAPIST

## 2024-11-29 PROCEDURE — 97165 OT EVAL LOW COMPLEX 30 MIN: CPT | Mod: GO | Performed by: OCCUPATIONAL THERAPIST

## 2024-11-29 PROCEDURE — 36415 COLL VENOUS BLD VENIPUNCTURE: CPT | Performed by: STUDENT IN AN ORGANIZED HEALTH CARE EDUCATION/TRAINING PROGRAM

## 2024-11-29 PROCEDURE — 99232 SBSQ HOSP IP/OBS MODERATE 35: CPT | Performed by: INTERNAL MEDICINE

## 2024-11-29 PROCEDURE — 250N000009 HC RX 250: Performed by: INTERNAL MEDICINE

## 2024-11-29 PROCEDURE — 999N000157 HC STATISTIC RCP TIME EA 10 MIN

## 2024-11-29 PROCEDURE — 120N000001 HC R&B MED SURG/OB

## 2024-11-29 PROCEDURE — 94640 AIRWAY INHALATION TREATMENT: CPT

## 2024-11-29 PROCEDURE — 80048 BASIC METABOLIC PNL TOTAL CA: CPT | Performed by: STUDENT IN AN ORGANIZED HEALTH CARE EDUCATION/TRAINING PROGRAM

## 2024-11-29 RX ADMIN — FLUTICASONE FUROATE AND VILANTEROL TRIFENATATE 1 PUFF: 200; 25 POWDER RESPIRATORY (INHALATION) at 09:34

## 2024-11-29 RX ADMIN — NICOTINE 1 PATCH: 21 PATCH, EXTENDED RELEASE TRANSDERMAL at 09:29

## 2024-11-29 RX ADMIN — AMLODIPINE BESYLATE 10 MG: 10 TABLET ORAL at 09:30

## 2024-11-29 RX ADMIN — CARVEDILOL 12.5 MG: 12.5 TABLET, FILM COATED ORAL at 21:52

## 2024-11-29 RX ADMIN — POLYETHYLENE GLYCOL 3350 17 G: 17 POWDER, FOR SOLUTION ORAL at 09:29

## 2024-11-29 RX ADMIN — APIXABAN 2.5 MG: 2.5 TABLET, FILM COATED ORAL at 09:51

## 2024-11-29 RX ADMIN — ISOSORBIDE MONONITRATE 120 MG: 60 TABLET, EXTENDED RELEASE ORAL at 09:30

## 2024-11-29 RX ADMIN — ACETAMINOPHEN 650 MG: 325 TABLET, FILM COATED ORAL at 21:58

## 2024-11-29 RX ADMIN — APIXABAN 2.5 MG: 2.5 TABLET, FILM COATED ORAL at 21:52

## 2024-11-29 RX ADMIN — GABAPENTIN 200 MG: 100 CAPSULE ORAL at 21:52

## 2024-11-29 RX ADMIN — CARVEDILOL 12.5 MG: 12.5 TABLET, FILM COATED ORAL at 09:33

## 2024-11-29 RX ADMIN — ROSUVASTATIN CALCIUM 10 MG: 10 TABLET, FILM COATED ORAL at 21:52

## 2024-11-29 RX ADMIN — SODIUM ZIRCONIUM CYCLOSILICATE 10 G: 5 POWDER, FOR SUSPENSION ORAL at 18:01

## 2024-11-29 RX ADMIN — IPRATROPIUM BROMIDE AND ALBUTEROL SULFATE 3 ML: .5; 3 SOLUTION RESPIRATORY (INHALATION) at 08:33

## 2024-11-29 RX ADMIN — CLOPIDOGREL BISULFATE 75 MG: 75 TABLET ORAL at 13:08

## 2024-11-29 RX ADMIN — FLUTICASONE PROPIONATE 1 SPRAY: 50 SPRAY, METERED NASAL at 09:34

## 2024-11-29 ASSESSMENT — ACTIVITIES OF DAILY LIVING (ADL)
ADLS_ACUITY_SCORE: 46
ADLS_ACUITY_SCORE: 49
ADLS_ACUITY_SCORE: 46
ADLS_ACUITY_SCORE: 49
ADLS_ACUITY_SCORE: 48
ADLS_ACUITY_SCORE: 46
ADLS_ACUITY_SCORE: 46
ADLS_ACUITY_SCORE: 48
ADLS_ACUITY_SCORE: 46
ADLS_ACUITY_SCORE: 46
ADLS_ACUITY_SCORE: 48
ADLS_ACUITY_SCORE: 48
ADLS_ACUITY_SCORE: 46
ADLS_ACUITY_SCORE: 48
ADLS_ACUITY_SCORE: 48
ADLS_ACUITY_SCORE: 49
ADLS_ACUITY_SCORE: 46

## 2024-11-29 NOTE — PROVIDER NOTIFICATION
she did not voided since 8 am. The last time she had voided was around 6 am. creatinine is trending up.    Paged Dr. Byers  and waiting response from him

## 2024-11-29 NOTE — PLAN OF CARE
"Patient Name: Larry  MRN: [id number]  Date of Admission: 11/20/2024  Reason for Admission: CHF, Pleural effusion  Level of Care: MS    Vitals:   BP Readings from Last 1 Encounters:   11/28/24 110/58     Pulse Readings from Last 1 Encounters:   11/28/24 53     Wt Readings from Last 1 Encounters:   11/28/24 43.6 kg (96 lb 1.9 oz)     Ht Readings from Last 1 Encounters:   11/20/24 1.575 m (5' 2\")     Estimated body mass index is 17.58 kg/m  as calculated from the following:    Height as of this encounter: 1.575 m (5' 2\").    Weight as of this encounter: 43.6 kg (96 lb 1.9 oz).  Temp Readings from Last 1 Encounters:   11/28/24 98  F (36.7  C) (Oral)       Pain: Pain goal 0-2 Pain Rating 0-4 Effective pain medication/regimen PRN Tylenol/sched. Gabapentin    CV Surgery Patient: No    Assessment    Resp: sating well on 1L of O2 via nc  Telemetry: SB  Neuro: A&O x4  GI/: oliguric, only urinated 30 ml last evening, incontinent episode this morning, BUS=20 ; non-distended abdomen; had a small BM last devendra (11/28)  Skin/Wounds: R AKA, stump CDI  Lines/Drains: PIV-SL  Activity: able to pivot to commode with minimal assist  Sleep: long intervals  Abnormal Labs: Creatinine trending up    Aggression Stop Light: Green          Patient Care Plan: monitor labs, strict IO, continue fluid restriction    "

## 2024-11-29 NOTE — PROGRESS NOTES
Occupational Therapy Discharge Summary    Reason for therapy discharge:    Has met all IP OT goals.     Progress towards therapy goal(s). See goals on Care Plan in [facility] electronic health record for goal details.  Goals met    Therapy recommendation(s):    No further therapy is recommended.

## 2024-11-29 NOTE — PROGRESS NOTES
Renal Medicine Progress Note            Assessment/Plan:     Shirley Hendricks is a 64 year old female who was admitted on 11/20/2024.      Assessment:  1) chronic kidney disease stage IIIb  Followed by [facility] nephrology, Dr. Barboza and DARREL Roberson, last visit 11/14/2024     Noted history LIMA June/July 2024 requiring dialysis, discontinued 6/24     Hyperkalemia last month, spironolactone discontinued with potassium 6.0.  Potassium but normal this admission.     Renal function worsened yesterday and now oliguric.  Likely with ischemic tubular injury with renal hypoperfusion and blood pressure to 100 systolic.  This occurred after cardiology increased significantly her antihypertensive regimen this past week.  May need temporary dialysis again as she had this past summer but will assess daily.  Medication today although does have a rising potassium.    Worsening hyponatremia again, 120 this morning.  2) hypervolemic state, history of HFpEF: Current regimen include 2 mg IV every 8 hours Bumex, received 2.5 mg metolazone 11/24.  No significant edema on exam, last chest x-ray  with pulmonary edema.     3) hypertension: Was recently on amlodipine 10 mg, hydralazine 100 mg 3 times daily, Imdur 120 mg daily, metoprolol 50 g twice daily, and valsartan added 11/27/2024.  Now changed to carvedilol 12.5 twice daily.  Hydralazine has been stopped, this morning holding valsartan.    Plan/Recs:  1) changed to low potassium diet.  Fluid restriction changed to 1.2 L daily  2) starting Lokelma 10 g 3 times daily to mitigate further rising potassium  3) strict I's and O's, daily weights.  4) making n.p.o. after midnight in case she needs dialysis catheter placed tomorrow  5) agree with holding valsartan with LIMA      Sean Loving DO  [facility]  Office: [phone]  Cell: [phone]        Interval History:     Patient wanting to go home, very upset that she will need to be staying now for acute kidney injury.  900  cc of urine yesterday, now a likely oliguric status with minimal urine output since midnight.    This morning blood pressure 142/68 although relative hypotensive yesterday and 100 systolic range.  Was given a isotonic fluid bolus.  Continue holding of diuretics as well as reduction in antihypertensive medicines.  Patient with no acute complaints though fortunately today.          Medications and Allergies:     Current Facility-Administered Medications   Medication Dose Route Frequency Provider Last Rate Last Admin    amLODIPine (NORVASC) tablet 10 mg  10 mg Oral Daily Yoli Huizar MD   10 mg at 11/29/24 0930    apixaban ANTICOAGULANT (ELIQUIS) tablet 2.5 mg  2.5 mg Oral BID Yoli Huizar MD   2.5 mg at 11/29/24 0951    carvedilol (COREG) tablet 12.5 mg  12.5 mg Oral BID w/meals Yoli Huizar MD   12.5 mg at 11/29/24 0933    [Held by provider] chlorthalidone (HYGROTON) tablet 25 mg  25 mg Oral Daily Lisa Zeng PA-C   25 mg at 11/28/24 0809    clopidogrel (PLAVIX) tablet 75 mg  75 mg Oral Daily Marcin White MD   75 mg at 11/28/24 0809    fluticasone (FLONASE) 50 MCG/ACT spray 1 spray  1 spray Both Nostrils Daily Liane Huertas MD   1 spray at 11/29/24 0934    fluticasone-vilanterol (BREO ELLIPTA) 200-25 MCG/ACT inhaler 1 puff  1 puff Inhalation Daily Marcin White MD   1 puff at 11/29/24 0934    gabapentin (NEURONTIN) capsule 200 mg  200 mg Oral At Bedtime Marcin White MD   200 mg at 11/28/24 2153    ipratropium - albuterol 0.5 mg/2.5 mg/3 mL (DUONEB) neb solution 3 mL  3 mL Nebulization 4x daily Eli Peraza MD   3 mL at 11/29/24 0833    isosorbide mononitrate (IMDUR) 24 hr tablet 120 mg  120 mg Oral Daily Yoli Huizar MD   120 mg at 11/29/24 0930    nicotine (NICODERM CQ) 21 MG/24HR 24 hr patch 1 patch  1 patch Transdermal Daily Carlos Hunter MD   1 patch at 11/29/24 0929    Followed by    [START ON 1/2/2025] nicotine (NICODERM CQ) 14  "MG/24HR 24 hr patch 1 patch  1 patch Transdermal Daily Carlos Hunter MD        Followed by    [START ON 1/30/2025] nicotine (NICODERM CQ) 7 MG/24HR 24 hr patch 1 patch  1 patch Transdermal Daily Carlos Hunter MD        polyethylene glycol (MIRALAX) Packet 17 g  17 g Oral Daily Yoli Huizar MD   17 g at 11/29/24 0929    rosuvastatin (CRESTOR) tablet 10 mg  10 mg Oral At Bedtime Marcin White MD   10 mg at 11/28/24 2154    sodium chloride (PF) 0.9% PF flush 3 mL  3 mL Intracatheter Q8H Marcin White MD   3 mL at 11/29/24 0609    [Held by provider] valsartan (DIOVAN) tablet 40 mg  40 mg Oral Daily Lisa Zeng PA-C   40 mg at 11/28/24 0828        Allergies   Allergen Reactions    Contrast Dye Anaphylaxis     Pt reported facial and throat swelling with prior CT contrast    Pantoprazole      Protonix caused diffuse edema    Chantix [Varenicline]      Terrible dreams    Gluten Meal GI Disturbance     Pt has celiac disease    Penicillins Itching and Rash            Physical Exam:   Vitals were reviewed  BP (!) 142/68   Pulse 55   Temp 97.8  F (36.6  C) (Oral)   Resp 17   Ht 1.575 m (5' 2\")   Wt 50.8 kg (111 lb 15.9 oz)   LMP  (LMP Unknown)   SpO2 93%   BMI 20.48 kg/m      Wt Readings from Last 3 Encounters:   11/29/24 50.8 kg (111 lb 15.9 oz)   09/23/24 44.8 kg (98 lb 12.8 oz)   08/15/24 47.2 kg (104 lb)       Intake/Output Summary (Last 24 hours) at 11/29/2024 1206  Last data filed at 11/29/2024 0600  Gross per 24 hour   Intake 780 ml   Output 30 ml   Net 750 ml       GENERAL: In no distress  HEENT:  Normocephalic. No gross abnormalities  CV: RRR, 1/6 systolic murmur, no dependent edema or in left lower extremity  No edema noted  RESP: Clear bilaterally with good efforts  GI: Abdomen soft/nt/nd, BS normal.   MUSCULOSKELETAL: Right leg amputation  SKIN: no suspicious lesions or rashes, dry to touch  PSYCH: mood good, affect appropriate              Data:     BMP  Recent Labs   Lab " 11/29/24  0650 11/28/24  0640 11/27/24  1045 11/26/24  0312   * 132* 133* 128*   POTASSIUM 5.0 4.3 4.1 4.6   CHLORIDE 90* 92* 92* 92*   SONIA 7.4* 8.1* 8.6* 8.1*   CO2 21* 24 28 23   .0* 97.0* 86.4* 94.3*   CR 3.41* 2.67* 1.92* 2.14*   GLC 88 80 93 116*     CBC  Recent Labs   Lab 11/29/24  0650 11/28/24  0640 11/27/24  1045 11/26/24  0312   WBC 10.4 8.8 7.6 7.3   HGB 10.9* 11.3* 11.2* 10.8*   HCT 33.0* 33.7* 34.6* 33.2*   MCV 83 83 83 83    253 278 272     Lab Results   Component Value Date    AST 16 11/21/2024    ALT 15 11/21/2024    ALKPHOS 66 11/21/2024    BILITOTAL 0.2 11/21/2024    BILICONJ 0.0 01/23/2004     Lab Results   Component Value Date    INR 1.31 (H) 04/11/2024       Attestation:  I have reviewed today's vital signs, notes, medications, labs and imaging.    DO [facility] - Nephrology  Office: [phone]  Cell: [phone]
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
## 2024-11-30 LAB
ANION GAP SERPL CALCULATED.3IONS-SCNC: 18 MMOL/L (ref 7–15)
BACTERIA SPT CULT: NORMAL
BUN SERPL-MCNC: 125.3 MG/DL (ref 8–23)
CALCIUM SERPL-MCNC: 7.1 MG/DL (ref 8.8–10.4)
CHLORIDE SERPL-SCNC: 84 MMOL/L (ref 98–107)
CREAT SERPL-MCNC: 3.78 MG/DL (ref 0.51–0.95)
EGFRCR SERPLBLD CKD-EPI 2021: 13 ML/MIN/1.73M2
ERYTHROCYTE [DISTWIDTH] IN BLOOD BY AUTOMATED COUNT: 14.6 % (ref 10–15)
GLUCOSE SERPL-MCNC: 77 MG/DL (ref 70–99)
GRAM STAIN RESULT: NORMAL
HCO3 SERPL-SCNC: 21 MMOL/L (ref 22–29)
HCT VFR BLD AUTO: 32.4 % (ref 35–47)
HGB BLD-MCNC: 10.6 G/DL (ref 11.7–15.7)
MCH RBC QN AUTO: 26.8 PG (ref 26.5–33)
MCHC RBC AUTO-ENTMCNC: 32.7 G/DL (ref 31.5–36.5)
MCV RBC AUTO: 82 FL (ref 78–100)
PLATELET # BLD AUTO: 208 10E3/UL (ref 150–450)
POTASSIUM SERPL-SCNC: 5.4 MMOL/L (ref 3.4–5.3)
RBC # BLD AUTO: 3.95 10E6/UL (ref 3.8–5.2)
SODIUM SERPL-SCNC: 123 MMOL/L (ref 135–145)
WBC # BLD AUTO: 9.4 10E3/UL (ref 4–11)

## 2024-11-30 PROCEDURE — 258N000003 HC RX IP 258 OP 636: Performed by: INTERNAL MEDICINE

## 2024-11-30 PROCEDURE — 250N000013 HC RX MED GY IP 250 OP 250 PS 637: Performed by: STUDENT IN AN ORGANIZED HEALTH CARE EDUCATION/TRAINING PROGRAM

## 2024-11-30 PROCEDURE — 99232 SBSQ HOSP IP/OBS MODERATE 35: CPT | Performed by: INTERNAL MEDICINE

## 2024-11-30 PROCEDURE — 85048 AUTOMATED LEUKOCYTE COUNT: CPT | Performed by: STUDENT IN AN ORGANIZED HEALTH CARE EDUCATION/TRAINING PROGRAM

## 2024-11-30 PROCEDURE — 85014 HEMATOCRIT: CPT | Performed by: STUDENT IN AN ORGANIZED HEALTH CARE EDUCATION/TRAINING PROGRAM

## 2024-11-30 PROCEDURE — 82565 ASSAY OF CREATININE: CPT | Performed by: STUDENT IN AN ORGANIZED HEALTH CARE EDUCATION/TRAINING PROGRAM

## 2024-11-30 PROCEDURE — 250N000009 HC RX 250: Performed by: INTERNAL MEDICINE

## 2024-11-30 PROCEDURE — 250N000013 HC RX MED GY IP 250 OP 250 PS 637: Performed by: INTERNAL MEDICINE

## 2024-11-30 PROCEDURE — 120N000001 HC R&B MED SURG/OB

## 2024-11-30 PROCEDURE — 94640 AIRWAY INHALATION TREATMENT: CPT | Mod: 76

## 2024-11-30 PROCEDURE — 999N000157 HC STATISTIC RCP TIME EA 10 MIN

## 2024-11-30 PROCEDURE — 94640 AIRWAY INHALATION TREATMENT: CPT

## 2024-11-30 PROCEDURE — 36415 COLL VENOUS BLD VENIPUNCTURE: CPT | Performed by: STUDENT IN AN ORGANIZED HEALTH CARE EDUCATION/TRAINING PROGRAM

## 2024-11-30 PROCEDURE — 80048 BASIC METABOLIC PNL TOTAL CA: CPT | Performed by: STUDENT IN AN ORGANIZED HEALTH CARE EDUCATION/TRAINING PROGRAM

## 2024-11-30 RX ADMIN — IPRATROPIUM BROMIDE AND ALBUTEROL SULFATE 3 ML: .5; 3 SOLUTION RESPIRATORY (INHALATION) at 07:20

## 2024-11-30 RX ADMIN — FLUTICASONE FUROATE AND VILANTEROL TRIFENATATE 1 PUFF: 200; 25 POWDER RESPIRATORY (INHALATION) at 09:17

## 2024-11-30 RX ADMIN — POLYETHYLENE GLYCOL 3350 17 G: 17 POWDER, FOR SOLUTION ORAL at 09:12

## 2024-11-30 RX ADMIN — NICOTINE 1 PATCH: 21 PATCH, EXTENDED RELEASE TRANSDERMAL at 09:15

## 2024-11-30 RX ADMIN — APIXABAN 2.5 MG: 2.5 TABLET, FILM COATED ORAL at 17:34

## 2024-11-30 RX ADMIN — SODIUM BICARBONATE: 84 INJECTION, SOLUTION INTRAVENOUS at 12:22

## 2024-11-30 RX ADMIN — ISOSORBIDE MONONITRATE 120 MG: 60 TABLET, EXTENDED RELEASE ORAL at 09:12

## 2024-11-30 RX ADMIN — SODIUM ZIRCONIUM CYCLOSILICATE 10 G: 5 POWDER, FOR SUSPENSION ORAL at 16:12

## 2024-11-30 RX ADMIN — CLOPIDOGREL BISULFATE 75 MG: 75 TABLET ORAL at 14:04

## 2024-11-30 RX ADMIN — SODIUM ZIRCONIUM CYCLOSILICATE 10 G: 5 POWDER, FOR SUSPENSION ORAL at 00:39

## 2024-11-30 RX ADMIN — IPRATROPIUM BROMIDE AND ALBUTEROL SULFATE 3 ML: .5; 3 SOLUTION RESPIRATORY (INHALATION) at 11:17

## 2024-11-30 RX ADMIN — APIXABAN 2.5 MG: 2.5 TABLET, FILM COATED ORAL at 06:10

## 2024-11-30 RX ADMIN — ROSUVASTATIN CALCIUM 10 MG: 10 TABLET, FILM COATED ORAL at 21:12

## 2024-11-30 RX ADMIN — FLUTICASONE PROPIONATE 1 SPRAY: 50 SPRAY, METERED NASAL at 09:17

## 2024-11-30 RX ADMIN — IPRATROPIUM BROMIDE AND ALBUTEROL SULFATE 3 ML: .5; 3 SOLUTION RESPIRATORY (INHALATION) at 20:21

## 2024-11-30 RX ADMIN — SODIUM ZIRCONIUM CYCLOSILICATE 10 G: 5 POWDER, FOR SUSPENSION ORAL at 10:41

## 2024-11-30 RX ADMIN — ACETAMINOPHEN 1000 MG: 500 TABLET, FILM COATED ORAL at 21:17

## 2024-11-30 RX ADMIN — GABAPENTIN 200 MG: 100 CAPSULE ORAL at 21:12

## 2024-11-30 RX ADMIN — AMLODIPINE BESYLATE 10 MG: 10 TABLET ORAL at 09:12

## 2024-11-30 ASSESSMENT — ACTIVITIES OF DAILY LIVING (ADL)
ADLS_ACUITY_SCORE: 48
ADLS_ACUITY_SCORE: 49
ADLS_ACUITY_SCORE: 48
ADLS_ACUITY_SCORE: 49
ADLS_ACUITY_SCORE: 48
ADLS_ACUITY_SCORE: 48

## 2024-11-30 NOTE — PROGRESS NOTES
[facility]  Hospitalist Progress Note  Levy Byers MD  11/30/2024    Assessment & Plan   Shirley Hendricks is a 64 year old female, on Plavix, with a history of COPD, hypertension, CHF, peripheral artery disease, NSTEMI, stage 4 CKD, and tobacco use who is being admitted for evaluation of shortness of breath.      ## Acute hypoxic respiratory failure   ## Acute on chronic diastolic heart failure in exacerbation:  ## Moderate to severe left pleural effusion status post thoracentesis  ## Type II NSTEMI likely due to demand due to heart failure exacerbation  ## Community Acquired Pnuemonia   Presents with 2-day history of increasing shortness of breath and a cough.  On admission chest x-ray shows a large left pleural effusion with compressive atelectasis.  There is also diffuse interstitial opacities consistent with pulmonary edema.  No pneumothorax was noted.  Labs are pertinent for a proBNP of 26,004-20, mildly elevated point of 98, creatinine 1.71. PTA on Norvasc 10 mg daily, atenolol 25 mg daily, torsemide 50 mg daily, Jardiance 10 mg daily  - Ultrasound guided thoracentesis on 11/20/24 s/p .1 liters of straw-colored fluid were removed   - Repeat CT showed bilateral pleural effusions with thoracentesis 1.2 L left and 1.5 L right side.  - Duplex on 11/21/24 no DVT  - Lights criteria consistent with transudate.  Cultures negative  - Solu-Medrol changed to prednisone on 11/24 and completed for total of 5 days  - TTE done during this hospitalization on 11/22/2024 showed LVEF normal at 55 to 60%.  RV function normal.  Mild 1+ MR, 1+ TR.  Mild pulmonary hypertension.  Compared to prior study there is no significant change  -Completed treatment for community-acquired pneumonia with cefuroxime and azithromycin  -Continue Hydralazine  100 mg 4 times a day, ISMN 120 mg daily  -Right heart catheterization done on 11/27/24 showed normal pressures  -Continue DuoNebs  -Apixaban 2.5mg bid   -on  RA    ## Hypertensive emergency  -See above manageemnt     ## New onset atrial fibrillation with RVR  -Converted -Sinus bradycardia  -Continue apixaban  -Continue carvedilol  -Pharmacy liaison consult on 11/25/2024-Xarelto/Eliquis $12  per month  -Carvedilol dose decreased due to bradycardia     ## Hypertension  ## Hyperlipidemia  ## Coronary artery disease with history of MI in 2019  ## Peripheral vascular disease with history of 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 2024  Assessment: PTA on amlodipine 10 mg, Coreg changed to atenolol 25 mg daily, hydralazine 50 mg 3 times daily, Imdur 60 mg daily, Crestor 10 mg  Plan:  -See above for management of her hypertension       ## Hyponatremia  -Sodium improved from 1   - 11/30 receiving 1L of 1/2 NS with 75 mEq HCO3      ## Acute kidney injury on CKD stage III  ## Suspected ATN secondary to hypotension and ARB + diuretic  ## Borderline hyperkalemia  Assessment: Creatinine on admission of 1.71, which is within her baseline.1.2-1.4 with worsening Cr in setting of hypotension and initiation of diuretics + ARB  -Nephrology ordered UA urine proteinuria, urine sodium, urea and creatinine  -Cr trend beginning to improve with 2.67 > 3.41 > 3.78 with increased urine output.  -Hold diuresis and chlorthalidone and Valsartan  -Lokelma as per nephrology    ## History of COPD not in exacerbation  - Continue with Breo and as needed albuterol nebs available     ## GERD  - Continue with PTA famotidine     ## Anemia of Chronic Disease  Baseline hemoglobin has been between 8-9 over the past few months  Plan:  Hb 11.5 No active bleeding      ## History of thrombocytopenia  - Platelet count normal on admission     ## Tobacco use  - Continue with nicotine patch  - Counselled on cessation     ## Generalized weakness  -Physical therapy and Occupational Therapy        ## Constipation  Bowel regimen    Diet: Fluid restriction 1200 ML FLUID  Combination Diet Gluten Free Diet; 2 gm NA Diet    DVT Prophylaxis:  "DOAC  Alvarez Catheter: Not present  Lines: None     Cardiac Monitoring: ACTIVE order. Indication: Post- PCI/Angiogram (24 hours)  Code Status: Full Code          Clinically Significant Risk Factors         # Hyponatremia: Lowest Na = 132 mmol/L in last 2 days, will monitor as appropriate  # Hypochloremia: Lowest Cl = 92 mmol/L in last 2 days, will monitor as appropriate      # Hypoalbuminemia: Lowest albumin = 3 g/dL at 11/21/2024  4:42 AM, will monitor as appropriate     # Hypertension: Noted on problem list             # Cachexia: Estimated body mass index is 17.58 kg/m  as calculated from the following:    Height as of this encounter: 1.575 m (5' 2\").    Weight as of this encounter: 43.6 kg (96 lb 1.9 oz).   # Moderate Malnutrition: based on nutrition assessment    # Financial/Environmental Concerns: none       Disposition Plan     Medically Ready for Discharge:   In 2-3 days    Disposition:     Interval History   -- chart reviewed  --  Cr up trend is improving  -- noted drop in Na but increased urine output.  -- noted poor urine output    -Data reviewed today: I reviewed all new labs and imaging over the last 24 hours. I personally reviewed no images or EKG's today.    Physical Exam    , Blood pressure 134/60, pulse 89, temperature 97.5  F (36.4  C), temperature source Oral, resp. rate 16, height 1.575 m (5' 2\"), weight 50.8 kg (111 lb 15.9 oz), SpO2 95%, not currently breastfeeding.  Vitals:    11/27/24 0700 11/28/24 0626 11/29/24 0543   Weight: 47.5 kg (104 lb 11.5 oz) 43.6 kg (96 lb 1.9 oz) 50.8 kg (111 lb 15.9 oz)     Vital Signs with Ranges  Temp:  [97.5  F (36.4  C)-98  F (36.7  C)] 97.5  F (36.4  C)  Pulse:  [48-97] 89  Resp:  [14-18] 16  BP: (132-153)/(60-81) 134/60  SpO2:  [90 %-95 %] 95 %  I/O's Last 24 hours  I/O last 3 completed shifts:  In: 1000 [P.O.:1000]  Out: -     Constitutional: Awake, alert, cooperative, no apparent distress  Respiratory: Clear to auscultation bilaterally, no crackles or " wheezing  Cardiovascular: Regular rate and rhythm, normal S1 and S2, and no murmur noted  GI: Normal bowel sounds, soft, non-distended, non-tender  Skin/Integumen:    Other:      Medications   All medications were reviewed.  Current Facility-Administered Medications   Medication Dose Route Frequency Provider Last Rate Last Admin    dextrose 5% and 0.45% NaCl 1,000 mL with sodium bicarbonate 75 mEq/L infusion   Intravenous Continuous Nereida Vaz MD 50 mL/hr at 11/30/24 1222 New Bag at 11/30/24 1222    No lozenges or gum should be given while patient on BIPAP/AVAPS/AVAPS AE   Does not apply Continuous PRN Marcin White MD        Patient may continue current oral medications   Does not apply Continuous PRN Marcin White MD         Current Facility-Administered Medications   Medication Dose Route Frequency Provider Last Rate Last Admin    amLODIPine (NORVASC) tablet 10 mg  10 mg Oral Daily Yoli Huizar MD   10 mg at 11/30/24 0912    apixaban ANTICOAGULANT (ELIQUIS) tablet 2.5 mg  2.5 mg Oral BID Yoli Huizar MD   2.5 mg at 11/30/24 0610    carvedilol (COREG) tablet 12.5 mg  12.5 mg Oral BID w/meals Yoli Huizar MD   12.5 mg at 11/29/24 2152    [Held by provider] chlorthalidone (HYGROTON) tablet 25 mg  25 mg Oral Daily Lisa Zeng PA-C   25 mg at 11/28/24 0809    clopidogrel (PLAVIX) tablet 75 mg  75 mg Oral Daily Marcin White MD   75 mg at 11/30/24 1404    fluticasone (FLONASE) 50 MCG/ACT spray 1 spray  1 spray Both Nostrils Daily Liane Huertas MD   1 spray at 11/30/24 0917    fluticasone-vilanterol (BREO ELLIPTA) 200-25 MCG/ACT inhaler 1 puff  1 puff Inhalation Daily Marcin White MD   1 puff at 11/30/24 0917    gabapentin (NEURONTIN) capsule 200 mg  200 mg Oral At Bedtime Marcin White MD   200 mg at 11/29/24 2152    ipratropium - albuterol 0.5 mg/2.5 mg/3 mL (DUONEB) neb solution 3 mL  3 mL Nebulization 4x daily Eli Peraza MD   3 mL at 11/30/24 1116     isosorbide mononitrate (IMDUR) 24 hr tablet 120 mg  120 mg Oral Daily Yoli Huizar MD   120 mg at 11/30/24 0912    nicotine (NICODERM CQ) 21 MG/24HR 24 hr patch 1 patch  1 patch Transdermal Daily Carlos Hunter MD   1 patch at 11/30/24 0915    Followed by    [START ON 1/2/2025] nicotine (NICODERM CQ) 14 MG/24HR 24 hr patch 1 patch  1 patch Transdermal Daily Carlos Hunter MD        Followed by    [START ON 1/30/2025] nicotine (NICODERM CQ) 7 MG/24HR 24 hr patch 1 patch  1 patch Transdermal Daily Carlos Hunter MD        polyethylene glycol (MIRALAX) Packet 17 g  17 g Oral Daily Yoli Huizar MD   17 g at 11/30/24 0912    rosuvastatin (CRESTOR) tablet 10 mg  10 mg Oral At Bedtime Marcin White MD   10 mg at 11/29/24 2152    sodium chloride (PF) 0.9% PF flush 3 mL  3 mL Intracatheter Q8H Marcin White MD   3 mL at 11/30/24 0609    sodium zirconium cyclosilicate (LOKELMA) packet 10 g  10 g Oral TID Marcin White MD   10 g at 11/30/24 1041    [Held by provider] valsartan (DIOVAN) tablet 40 mg  40 mg Oral Daily Lisa Zeng PA-C   40 mg at 11/28/24 0828        Data   Recent Labs   Lab 11/30/24  0646 11/29/24  0650 11/28/24  0640   WBC 9.4 10.4 8.8   HGB 10.6* 10.9* 11.3*   MCV 82 83 83    234 253   * 128* 132*   POTASSIUM 5.4* 5.0 4.3   CHLORIDE 84* 90* 92*   CO2 21* 21* 24   .3* 116.0* 97.0*   CR 3.78* 3.41* 2.67*   ANIONGAP 18* 17* 16*   SONIA 7.1* 7.4* 8.1*   GLC 77 88 80       No results found for this or any previous visit (from the past 24 hours).    Levy Byers MD  Text Page  (7am to 6pm)

## 2024-11-30 NOTE — PLAN OF CARE
"Goal Outcome Evaluation:    Pt alert, orient x4. /81   Pulse 62   Temp 97.6  F (36.4  C) (Oral)   Resp 18   Ht 1.575 m (5' 2\")   Wt 50.8 kg (111 lb 15.9 oz)   LMP  (LMP Unknown)   SpO2 95%   BMI 20.48 kg/m     On RA Sinus christel on tele. Pt Given tylenol for ongoing back pain. NPO tonight for possible hemodialysis access tomorrow.      "

## 2024-11-30 NOTE — PROGRESS NOTES
Renal Medicine Progress Note            Assessment/Plan:     Shirley Hendricks is a 64 year old female who was admitted on 11/20/2024.      Assessment:  1) chronic kidney disease stage IIIb  Followed by [facility] nephrology, Dr. Barboza and DARREL Roberson, last visit 11/14/2024     Noted history LIMA June/July 2024 requiring dialysis, discontinued 6/24     Hyperkalemia last month, spironolactone discontinued with potassium 6.0.  Potassium but normal this admission.    Creatinine worsened in-house after relative hypotensive episodes after escalating blood pressure medications/diuretics/valsartan.  Likely ischemic ATN.  Encouraged to see improving urine output.  Rate of rise of creatinine slowing down as well.  Continue to hold diuretics.  Normal biventricular filling pressures on [facility]  No urgent indication for dialysis.  Patient understands she is fairly close.  Measure urine output closely.  Continue Lokelma.  Total 1 L IV fluid, half-normal saline +75 mg of bicarb at 75 cc an hour  Continue with p.o. fluid restriction  Cancel n.p.o. today.  Renal diet.  Will tentatively put n.p.o. again at midnight.         2) hypertension: Blood pressure reasonable.  Could do with slightly higher perfusion pressures.  Continue to hold valsartan..     Daily renal panel.        Interval History:     Seen/examined.  On room air.  Laying flat.  Comfortable.  Denies nausea or vomiting.  Reports he already had 2 urinations this morning.  Not recorded.  Making more urine.  Creatinine slightly up.  Potassium 5.4.  Sodium 123.          Medications and Allergies:     Current Facility-Administered Medications   Medication Dose Route Frequency Provider Last Rate Last Admin    amLODIPine (NORVASC) tablet 10 mg  10 mg Oral Daily Yoli Huizar MD   10 mg at 11/30/24 0912    apixaban ANTICOAGULANT (ELIQUIS) tablet 2.5 mg  2.5 mg Oral BID Yoli Huizar MD   2.5 mg at 11/30/24 0610    carvedilol (COREG) tablet 12.5 mg  12.5  mg Oral BID w/meals Yoli Huizar MD   12.5 mg at 11/29/24 2152    [Held by provider] chlorthalidone (HYGROTON) tablet 25 mg  25 mg Oral Daily Lisa Zeng PA-C   25 mg at 11/28/24 0809    clopidogrel (PLAVIX) tablet 75 mg  75 mg Oral Daily Marcin White MD   75 mg at 11/29/24 1308    fluticasone (FLONASE) 50 MCG/ACT spray 1 spray  1 spray Both Nostrils Daily Liane Huertas MD   1 spray at 11/30/24 0917    fluticasone-vilanterol (BREO ELLIPTA) 200-25 MCG/ACT inhaler 1 puff  1 puff Inhalation Daily Marcin White MD   1 puff at 11/30/24 0917    gabapentin (NEURONTIN) capsule 200 mg  200 mg Oral At Bedtime Marcin White MD   200 mg at 11/29/24 2152    ipratropium - albuterol 0.5 mg/2.5 mg/3 mL (DUONEB) neb solution 3 mL  3 mL Nebulization 4x daily Eli Peraza MD   3 mL at 11/30/24 0720    isosorbide mononitrate (IMDUR) 24 hr tablet 120 mg  120 mg Oral Daily Yoli Huizar MD   120 mg at 11/30/24 0912    nicotine (NICODERM CQ) 21 MG/24HR 24 hr patch 1 patch  1 patch Transdermal Daily Carlos Hunter MD   1 patch at 11/30/24 0915    Followed by    [START ON 1/2/2025] nicotine (NICODERM CQ) 14 MG/24HR 24 hr patch 1 patch  1 patch Transdermal Daily Carlos Hunter MD        Followed by    [START ON 1/30/2025] nicotine (NICODERM CQ) 7 MG/24HR 24 hr patch 1 patch  1 patch Transdermal Daily Carlos Hunter MD        polyethylene glycol (MIRALAX) Packet 17 g  17 g Oral Daily Yoli Huizar MD   17 g at 11/30/24 0912    rosuvastatin (CRESTOR) tablet 10 mg  10 mg Oral At Bedtime Marcin White MD   10 mg at 11/29/24 2152    sodium chloride (PF) 0.9% PF flush 3 mL  3 mL Intracatheter Q8H Marcin White MD   3 mL at 11/30/24 0609    sodium zirconium cyclosilicate (LOKELMA) packet 10 g  10 g Oral TID Sean Loving DO   10 g at 11/30/24 0039    [Held by provider] valsartan (DIOVAN) tablet 40 mg  40 mg Oral Daily Lisa Zeng PA-C   40 mg at 11/28/24 8652       "  Allergies   Allergen Reactions    Contrast Dye Anaphylaxis     Pt reported facial and throat swelling with prior CT contrast    Pantoprazole      Protonix caused diffuse edema    Chantix [Varenicline]      Terrible dreams    Gluten Meal GI Disturbance     Pt has celiac disease    Penicillins Itching and Rash            Physical Exam:   Vitals were reviewed  BP (!) 153/73 (BP Location: Right arm, Patient Position: Semi-Myers's, Cuff Size: Adult Regular)   Pulse (!) 49   Temp 98  F (36.7  C) (Oral)   Resp 16   Ht 1.575 m (5' 2\")   Wt 50.8 kg (111 lb 15.9 oz)   LMP  (LMP Unknown)   SpO2 93%   BMI 20.48 kg/m      Wt Readings from Last 3 Encounters:   11/29/24 50.8 kg (111 lb 15.9 oz)   09/23/24 44.8 kg (98 lb 12.8 oz)   08/15/24 47.2 kg (104 lb)           GENERAL: In no distress  HEENT:  Normocephalic. No gross abnormalities  CV: RRR, 1/6 systolic murmur, no dependent edema or in left lower extremity  No edema noted  RESP: Clear bilaterally with good efforts  GI: Abdomen soft/nt/nd, BS normal.   MUSCULOSKELETAL: Right leg amputation.  No edema  SKIN: no suspicious lesions or rashes, dry to touch  PSYCH: mood good, affect appropriate           Data:     BMP  Recent Labs   Lab 11/30/24  0646 11/29/24  0650 11/28/24  0640 11/27/24  1045   * 128* 132* 133*   POTASSIUM 5.4* 5.0 4.3 4.1   CHLORIDE 84* 90* 92* 92*   SONIA 7.1* 7.4* 8.1* 8.6*   CO2 21* 21* 24 28   .3* 116.0* 97.0* 86.4*   CR 3.78* 3.41* 2.67* 1.92*   GLC 77 88 80 93     CBC  Recent Labs   Lab 11/30/24  0646 11/29/24  0650 11/28/24  0640 11/27/24  1045   WBC 9.4 10.4 8.8 7.6   HGB 10.6* 10.9* 11.3* 11.2*   HCT 32.4* 33.0* 33.7* 34.6*   MCV 82 83 83 83    234 253 278     Lab Results   Component Value Date    AST 16 11/21/2024    ALT 15 11/21/2024    ALKPHOS 66 11/21/2024    BILITOTAL 0.2 11/21/2024    BILICONJ 0.0 01/23/2004     Lab Results   Component Value Date    INR 1.31 (H) 04/11/2024       Attestation:  I have reviewed today's " vital signs, notes, medications, labs and imaging.    Nereida Vaz MD  [facility] - Nephrology  Office: [phone]

## 2024-11-30 NOTE — PLAN OF CARE
Neuro: A&O x4  Tele/cardiac: SB  Respiration: 2l nc when asleep  Activity: A1 pivot transfer to bedside BAHMAN milan  Pain: denies  Drips: PIV SL  Drains/tubes: none  Skin:intact  GI/: oliguric  Aggression color: green  Isolation: none  Plan: has been NPO since midnight, dialysis catheter placement today

## 2024-11-30 NOTE — PLAN OF CARE
"    Vitals:   BP Readings from Last 1 Encounters:   11/29/24 136/78     Pulse Readings from Last 1 Encounters:   11/29/24 97     Wt Readings from Last 1 Encounters:   11/29/24 50.8 kg (111 lb 15.9 oz)     Ht Readings from Last 1 Encounters:   11/20/24 1.575 m (5' 2\")     Estimated body mass index is 20.48 kg/m  as calculated from the following:    Height as of this encounter: 1.575 m (5' 2\").    Weight as of this encounter: 50.8 kg (111 lb 15.9 oz).  Temp Readings from Last 1 Encounters:   11/29/24 97.8  F (36.6  C) (Oral)       Pain: Pain goal 0 Pain Rating denies pain Effective pain medication/regimen none    CV Surgery Patient: No    Assessment    Resp: 18  Telemetry: ST  Neuro: intact  GI/: Oliguria, 1 wet brief for the whole shift  Skin/Wounds: scattered bruising and dry shin   Lines/Drains: piv  Activity: Ax1  Sleep: 1 nap  Aggression Stop Light: Green          Patient Care Plan: possible dialysis tomorrow.     "

## 2024-12-01 LAB
ANION GAP SERPL CALCULATED.3IONS-SCNC: 18 MMOL/L (ref 7–15)
BUN SERPL-MCNC: 122.3 MG/DL (ref 8–23)
CALCIUM SERPL-MCNC: 7 MG/DL (ref 8.8–10.4)
CHLORIDE SERPL-SCNC: 82 MMOL/L (ref 98–107)
CREAT SERPL-MCNC: 3.36 MG/DL (ref 0.51–0.95)
EGFRCR SERPLBLD CKD-EPI 2021: 14 ML/MIN/1.73M2
ERYTHROCYTE [DISTWIDTH] IN BLOOD BY AUTOMATED COUNT: 14.6 % (ref 10–15)
GLUCOSE SERPL-MCNC: 89 MG/DL (ref 70–99)
HCO3 SERPL-SCNC: 21 MMOL/L (ref 22–29)
HCT VFR BLD AUTO: 29.9 % (ref 35–47)
HGB BLD-MCNC: 10.2 G/DL (ref 11.7–15.7)
MCH RBC QN AUTO: 27.3 PG (ref 26.5–33)
MCHC RBC AUTO-ENTMCNC: 34.1 G/DL (ref 31.5–36.5)
MCV RBC AUTO: 80 FL (ref 78–100)
PLATELET # BLD AUTO: 215 10E3/UL (ref 150–450)
POTASSIUM SERPL-SCNC: 3.9 MMOL/L (ref 3.4–5.3)
RBC # BLD AUTO: 3.74 10E6/UL (ref 3.8–5.2)
SODIUM SERPL-SCNC: 121 MMOL/L (ref 135–145)
SODIUM SERPL-SCNC: 124 MMOL/L (ref 135–145)
SODIUM SERPL-SCNC: 124 MMOL/L (ref 135–145)
WBC # BLD AUTO: 8.8 10E3/UL (ref 4–11)

## 2024-12-01 PROCEDURE — 120N000001 HC R&B MED SURG/OB

## 2024-12-01 PROCEDURE — 250N000009 HC RX 250: Performed by: INTERNAL MEDICINE

## 2024-12-01 PROCEDURE — 250N000013 HC RX MED GY IP 250 OP 250 PS 637: Performed by: STUDENT IN AN ORGANIZED HEALTH CARE EDUCATION/TRAINING PROGRAM

## 2024-12-01 PROCEDURE — 85014 HEMATOCRIT: CPT | Performed by: STUDENT IN AN ORGANIZED HEALTH CARE EDUCATION/TRAINING PROGRAM

## 2024-12-01 PROCEDURE — 85041 AUTOMATED RBC COUNT: CPT | Performed by: STUDENT IN AN ORGANIZED HEALTH CARE EDUCATION/TRAINING PROGRAM

## 2024-12-01 PROCEDURE — 99232 SBSQ HOSP IP/OBS MODERATE 35: CPT | Performed by: INTERNAL MEDICINE

## 2024-12-01 PROCEDURE — 36415 COLL VENOUS BLD VENIPUNCTURE: CPT | Performed by: INTERNAL MEDICINE

## 2024-12-01 PROCEDURE — 250N000013 HC RX MED GY IP 250 OP 250 PS 637: Performed by: INTERNAL MEDICINE

## 2024-12-01 PROCEDURE — 80048 BASIC METABOLIC PNL TOTAL CA: CPT | Performed by: STUDENT IN AN ORGANIZED HEALTH CARE EDUCATION/TRAINING PROGRAM

## 2024-12-01 PROCEDURE — 93005 ELECTROCARDIOGRAM TRACING: CPT

## 2024-12-01 PROCEDURE — 84295 ASSAY OF SERUM SODIUM: CPT | Performed by: INTERNAL MEDICINE

## 2024-12-01 PROCEDURE — 36415 COLL VENOUS BLD VENIPUNCTURE: CPT | Performed by: STUDENT IN AN ORGANIZED HEALTH CARE EDUCATION/TRAINING PROGRAM

## 2024-12-01 RX ADMIN — APIXABAN 2.5 MG: 2.5 TABLET, FILM COATED ORAL at 07:15

## 2024-12-01 RX ADMIN — ACETAMINOPHEN 1000 MG: 500 TABLET, FILM COATED ORAL at 20:43

## 2024-12-01 RX ADMIN — APIXABAN 2.5 MG: 2.5 TABLET, FILM COATED ORAL at 18:20

## 2024-12-01 RX ADMIN — CLOPIDOGREL BISULFATE 75 MG: 75 TABLET ORAL at 12:56

## 2024-12-01 RX ADMIN — ROSUVASTATIN CALCIUM 10 MG: 10 TABLET, FILM COATED ORAL at 20:34

## 2024-12-01 RX ADMIN — NICOTINE 1 PATCH: 21 PATCH, EXTENDED RELEASE TRANSDERMAL at 09:00

## 2024-12-01 RX ADMIN — IPRATROPIUM BROMIDE AND ALBUTEROL SULFATE 3 ML: .5; 3 SOLUTION RESPIRATORY (INHALATION) at 07:13

## 2024-12-01 RX ADMIN — ISOSORBIDE MONONITRATE 120 MG: 60 TABLET, EXTENDED RELEASE ORAL at 08:59

## 2024-12-01 RX ADMIN — GABAPENTIN 200 MG: 100 CAPSULE ORAL at 20:34

## 2024-12-01 RX ADMIN — SODIUM ZIRCONIUM CYCLOSILICATE 10 G: 5 POWDER, FOR SUSPENSION ORAL at 00:39

## 2024-12-01 RX ADMIN — AMLODIPINE BESYLATE 10 MG: 10 TABLET ORAL at 08:59

## 2024-12-01 RX ADMIN — SODIUM CHLORIDE: 4 INJECTION, SOLUTION, CONCENTRATE INTRAVENOUS at 14:46

## 2024-12-01 RX ADMIN — FLUTICASONE FUROATE AND VILANTEROL TRIFENATATE 1 PUFF: 200; 25 POWDER RESPIRATORY (INHALATION) at 09:00

## 2024-12-01 ASSESSMENT — ACTIVITIES OF DAILY LIVING (ADL)
ADLS_ACUITY_SCORE: 49
ADLS_ACUITY_SCORE: 48
ADLS_ACUITY_SCORE: 48
ADLS_ACUITY_SCORE: 49
ADLS_ACUITY_SCORE: 48
ADLS_ACUITY_SCORE: 48
ADLS_ACUITY_SCORE: 49

## 2024-12-01 NOTE — PLAN OF CARE
Goal Outcome Evaluation:      Plan of Care Reviewed With: patient      DATE & TIME: 12/1/2024     Summary: Pt admitted on 11/202/024 for SOB. Acute on chronic CHF evacerbation, Bilateral pleural effusion.. LIMA  Cognitive Concerns/ Orientation : A/O X 4    BEHAVIOR & AGGRESSION TOOL COLOR: Green   CIWA SCORE: NA   ABNL VS/O2: VSS, RA. Per report pt uses oxygen when sleeping   MOBILITY: up with 1 person assist. Wheel chair bound at baseline   PAIN MANAGMENT: denies pain   DIET: olvin diet.   BOWEL/BLADDER: oliguria. Continence otherwise   ABNL LAB/BG: Cr 3.36. Na 121. K =3.9  DRAIN/DEVICES: PIV infusing with 2 % sodium solution   TELEMETRY RHYTHM: NA   SKIN: GERALD pt asked to come back to check after the game   TESTS/PROCEDURES: sodium checks q4 hours while on sodium infusion.   D/C DATE: TBD  Discharge Barriers: pending improvement in sodium and Creatinine  OTHER IMPORTANT INFO: sodium 2 % solution started on arrival to the floor. Stop  infusion once sodium reaches 125 per order. See nephrology note

## 2024-12-01 NOTE — PROGRESS NOTES
Renal Medicine Progress Note            Assessment/Plan:     Shirley Hendricks is a 64 year old female who was admitted on 11/20/2024.      Assessment:  1) chronic kidney disease stage IIIb  Followed by [facility] nephrology, Dr. Barboza and DARREL Roberson, last visit 11/14/2024     Noted history LIMA June/July 2024 requiring dialysis, discontinued 6/24     Hyperkalemia last month, spironolactone discontinued with potassium 6.0.  Potassium but normal this admission.    Creatinine worsened in-house after relative hypotensive episodes after escalating blood pressure medications/diuretics/valsartan.  Likely ischemic ATN.  Encouraged to see creatinine plateaued and improving.  Continue to hold diuretics.  Normal biventricular filling pressures on [facility]  No urgent indication for dialysis.  Patient understands she is fairly close.  Measure urine output closely.  Hold Lokelma  Continue with p.o. fluid restriction    2 hyponatremia-with acute renal failure.  Sodium 121    2% saline 40 mL/h.  Check sodium in 4 hours.  Stop 2% once serum sodium is 125 or more.  Continue p.o. fluid restriction as ordered.         3) hypertension: Blood pressure reasonable.  Could do with slightly higher perfusion pressures.  Continue to hold valsartan..     Daily renal panel.        Interval History:     Seen/examined.  On room air.  Laying flat.  Comfortable.  Denies nausea or vomiting.  She feels okay.  Hungry.  Tolerated IV fluid.  Creatinine improved to 3.3 but sodium is down to 121.  Bicarb 21          Medications and Allergies:     Current Facility-Administered Medications   Medication Dose Route Frequency Provider Last Rate Last Admin    amLODIPine (NORVASC) tablet 10 mg  10 mg Oral Daily Yoli Huizar MD   10 mg at 12/01/24 0859    apixaban ANTICOAGULANT (ELIQUIS) tablet 2.5 mg  2.5 mg Oral BID Yoli Huizar MD   2.5 mg at 12/01/24 0715    carvedilol (COREG) tablet 12.5 mg  12.5 mg Oral BID w/meals Bhupendra  Yoli Villafana MD   12.5 mg at 11/29/24 2152    [Held by provider] chlorthalidone (HYGROTON) tablet 25 mg  25 mg Oral Daily Lisa Zeng PA-C   25 mg at 11/28/24 0809    clopidogrel (PLAVIX) tablet 75 mg  75 mg Oral Daily Marcin White MD   75 mg at 12/01/24 1256    fluticasone (FLONASE) 50 MCG/ACT spray 1 spray  1 spray Both Nostrils Daily Liane Huertas MD   1 spray at 11/30/24 0917    fluticasone-vilanterol (BREO ELLIPTA) 200-25 MCG/ACT inhaler 1 puff  1 puff Inhalation Daily Marcin White MD   1 puff at 12/01/24 0900    gabapentin (NEURONTIN) capsule 200 mg  200 mg Oral At Bedtime Marcin White MD   200 mg at 11/30/24 2112    ipratropium - albuterol 0.5 mg/2.5 mg/3 mL (DUONEB) neb solution 3 mL  3 mL Nebulization 4x daily Eli Peraza MD   3 mL at 12/01/24 0713    isosorbide mononitrate (IMDUR) 24 hr tablet 120 mg  120 mg Oral Daily Yoli Huizar MD   120 mg at 12/01/24 0859    nicotine (NICODERM CQ) 21 MG/24HR 24 hr patch 1 patch  1 patch Transdermal Daily Carlos Hunter MD   1 patch at 12/01/24 0900    Followed by    [START ON 1/2/2025] nicotine (NICODERM CQ) 14 MG/24HR 24 hr patch 1 patch  1 patch Transdermal Daily Carlos Hunter MD        Followed by    [START ON 1/30/2025] nicotine (NICODERM CQ) 7 MG/24HR 24 hr patch 1 patch  1 patch Transdermal Daily Carlos Hunter MD        polyethylene glycol (MIRALAX) Packet 17 g  17 g Oral Daily Yoli Huizar MD   17 g at 11/30/24 0912    rosuvastatin (CRESTOR) tablet 10 mg  10 mg Oral At Bedtime Marcin White MD   10 mg at 11/30/24 2112    sodium chloride (PF) 0.9% PF flush 3 mL  3 mL Intracatheter Q8H Marcin White MD   3 mL at 11/30/24 2103    sodium zirconium cyclosilicate (LOKELMA) packet 10 g  10 g Oral TID Marcin White MD   10 g at 12/01/24 0039    [Held by provider] valsartan (DIOVAN) tablet 40 mg  40 mg Oral Daily Lisa Zeng PA-C   40 mg at 11/28/24 0828        Allergies   Allergen Reactions     "Contrast Dye Anaphylaxis     Pt reported facial and throat swelling with prior CT contrast    Pantoprazole      Protonix caused diffuse edema    Chantix [Varenicline]      Terrible dreams    Gluten Meal GI Disturbance     Pt has celiac disease    Penicillins Itching and Rash            Physical Exam:   Vitals were reviewed  BP (!) 150/69 (BP Location: Right arm)   Pulse 55   Temp 97.8  F (36.6  C) (Oral)   Resp 20   Ht 1.575 m (5' 2\")   Wt 49.1 kg (108 lb 3.9 oz)   LMP  (LMP Unknown)   SpO2 (!) 89%   BMI 19.80 kg/m      Wt Readings from Last 3 Encounters:   12/01/24 49.1 kg (108 lb 3.9 oz)   09/23/24 44.8 kg (98 lb 12.8 oz)   08/15/24 47.2 kg (104 lb)           GENERAL: In no distress  HEENT:  Normocephalic. No gross abnormalities  CV: RRR, 1/6 systolic murmur, no dependent edema or in left lower extremity  No edema noted  RESP: Clear bilaterally with good efforts  GI: Abdomen soft/nt/nd, BS normal.   MUSCULOSKELETAL: Right leg amputation.  No edema  SKIN: no suspicious lesions or rashes, dry to touch  PSYCH: mood good, affect appropriate           Data:     BMP  Recent Labs   Lab 12/01/24  0509 11/30/24  0646 11/29/24  0650 11/28/24  0640   * 123* 128* 132*   POTASSIUM 3.9 5.4* 5.0 4.3   CHLORIDE 82* 84* 90* 92*   SONIA 7.0* 7.1* 7.4* 8.1*   CO2 21* 21* 21* 24   .3* 125.3* 116.0* 97.0*   CR 3.36* 3.78* 3.41* 2.67*   GLC 89 77 88 80     CBC  Recent Labs   Lab 12/01/24  0509 11/30/24  0646 11/29/24  0650 11/28/24  0640   WBC 8.8 9.4 10.4 8.8   HGB 10.2* 10.6* 10.9* 11.3*   HCT 29.9* 32.4* 33.0* 33.7*   MCV 80 82 83 83    208 234 253     Lab Results   Component Value Date    AST 16 11/21/2024    ALT 15 11/21/2024    ALKPHOS 66 11/21/2024    BILITOTAL 0.2 11/21/2024    BILICONJ 0.0 01/23/2004     Lab Results   Component Value Date    INR 1.31 (H) 04/11/2024       Attestation:  I have reviewed today's vital signs, notes, medications, labs and imaging.    Nereida Vaz MD  [facility] - " Nephrology  Office: [phone]

## 2024-12-01 NOTE — PLAN OF CARE
"  Vitals:   BP Readings from Last 1 Encounters:   11/30/24 (!) 146/68     Pulse Readings from Last 1 Encounters:   11/30/24 52     Wt Readings from Last 1 Encounters:   11/29/24 50.8 kg (111 lb 15.9 oz)     Ht Readings from Last 1 Encounters:   11/20/24 1.575 m (5' 2\")     Estimated body mass index is 20.48 kg/m  as calculated from the following:    Height as of this encounter: 1.575 m (5' 2\").    Weight as of this encounter: 50.8 kg (111 lb 15.9 oz).  Temp Readings from Last 1 Encounters:   11/30/24 97.4  F (36.3  C) (Oral)       Pain: Pain goal 0 Pain Rating 0 Effective pain medication/regimen none give    CV Surgery Patient: No    Assessment    Resp: 16  Telemetry: SB  Neuro: intact  GI/: oliguria, 2 BM  Skin/Wounds: scattered bruising   Lines/Drains: No lines, consulted IV team to place new one   Activity: Ax1 gait belt and walker   Sleep: two naps   Aggression Stop Light: Green              "

## 2024-12-01 NOTE — PROGRESS NOTES
[facility]  Hospitalist Progress Note  Levy Byers MD  12/01/2024    Assessment & Plan   Shirley Hendricks is a 64 year old female, on Plavix, with a history of COPD, hypertension, CHF, peripheral artery disease, NSTEMI, stage 4 CKD, and tobacco use who is being admitted for evaluation of shortness of breath.      ## Acute hypoxic respiratory failure   ## Acute on chronic diastolic heart failure in exacerbation:  ## Moderate to severe left pleural effusion status post thoracentesis  ## Type II NSTEMI likely due to demand due to heart failure exacerbation  ## Community Acquired Pnuemonia   Presents with 2-day history of increasing shortness of breath and a cough.  On admission chest x-ray shows a large left pleural effusion with compressive atelectasis.  There is also diffuse interstitial opacities consistent with pulmonary edema.  No pneumothorax was noted.  Labs are pertinent for a proBNP of 26,004-20, mildly elevated point of 98, creatinine 1.71. PTA on Norvasc 10 mg daily, atenolol 25 mg daily, torsemide 50 mg daily, Jardiance 10 mg daily  - Ultrasound guided thoracentesis on 11/20/24 s/p .1 liters of straw-colored fluid were removed   - Repeat CT showed bilateral pleural effusions with thoracentesis 1.2 L left and 1.5 L right side.  - Duplex on 11/21/24 no DVT  - Lights criteria consistent with transudate.  Cultures negative  - Solu-Medrol changed to prednisone on 11/24 and completed for total of 5 days  - TTE done during this hospitalization on 11/22/2024 showed LVEF normal at 55 to 60%.  RV function normal.  Mild 1+ MR, 1+ TR.  Mild pulmonary hypertension.  Compared to prior study there is no significant change  -Completed treatment for community-acquired pneumonia with cefuroxime and azithromycin  -Continue Hydralazine  100 mg 4 times a day, ISMN 120 mg daily  -Right heart catheterization done on 11/27/24 showed normal pressures  -Continue DuoNebs  -Apixaban 2.5mg bid   -on  room air    ## Hypertensive emergency  -See above manageemnt     ## New onset atrial fibrillation with RVR  -Converted -Sinus bradycardia  -Continue apixaban  -Continue carvedilol  -Pharmacy liaison consult on 11/25/2024-Xarelto/Eliquis $12  per month  -Carvedilol dose decreased due to bradycardia     ## Hypertension  ## Hyperlipidemia  ## Coronary artery disease with history of MI in 2019  ## Peripheral vascular disease with history of 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 2024  Assessment: PTA on amlodipine 10 mg, Coreg changed to atenolol 25 mg daily, hydralazine 50 mg 3 times daily, Imdur 60 mg daily, Crestor 10 mg  Plan:  -See above for management of her hypertension       ## Hyponatremia  -Sodium 132 > 128 > 123 > 121  - 11/30 receiving 1L of 1/2 NS with 75 mEq HCO3  - 12/1 to get 2% saline today      ## Acute kidney injury on CKD stage III  ## Suspected ATN secondary to hypotension and ARB + diuretic  ## Borderline hyperkalemia  Assessment: Creatinine on admission of 1.71, which is within her baseline.1.2-1.4 with worsening Cr in setting of hypotension and initiation of diuretics + ARB  -Nephrology ordered UA urine proteinuria, urine sodium, urea and creatinine  -Cr trend beginning to improve with 2.67 > 3.41 > 3.78 with increased urine output.  -Hold diuresis and chlorthalidone and Valsartan  -Lokelma as per nephrology  -Cr now improving, urine output 200 ml, and picking up    ## History of COPD not in exacerbation  - Continue with Breo and as needed albuterol nebs available     ## GERD  - Continue with PTA famotidine     ## Anemia of Chronic Disease  Baseline hemoglobin has been between 8-9 over the past few months  Plan:  Hb 11.5 No active bleeding      ## History of thrombocytopenia  - Platelet count normal on admission     ## Tobacco use  - Continue with nicotine patch  - Counselled on cessation     ## Generalized weakness  -Physical therapy and Occupational Therapy        ## Constipation  Bowel regimen    Diet: Fluid  "restriction 2000 ML FLUID  Combination Diet Gluten Free Diet; 2 gm NA Diet    DVT Prophylaxis: DOAC  Alvarez Catheter: Not present  Lines: None     Cardiac Monitoring: ACTIVE order. Indication: Post- PCI/Angiogram (24 hours)  Code Status: Full Code          Clinically Significant Risk Factors         # Hyponatremia: Lowest Na = 132 mmol/L in last 2 days, will monitor as appropriate  # Hypochloremia: Lowest Cl = 92 mmol/L in last 2 days, will monitor as appropriate      # Hypoalbuminemia: Lowest albumin = 3 g/dL at 11/21/2024  4:42 AM, will monitor as appropriate     # Hypertension: Noted on problem list             # Cachexia: Estimated body mass index is 17.58 kg/m  as calculated from the following:    Height as of this encounter: 1.575 m (5' 2\").    Weight as of this encounter: 43.6 kg (96 lb 1.9 oz).   # Moderate Malnutrition: based on nutrition assessment    # Financial/Environmental Concerns: none       Disposition Plan     Medically Ready for Discharge:   In 2-3 days    Disposition:     Interval History   -- chart reviewed  -- Cr now improving  -- noted further drop in Na but increased urine output.  -- discussed with RN    -Data reviewed today: I reviewed all new labs and imaging over the last 24 hours. I personally reviewed no images or EKG's today.    Physical Exam    , Blood pressure (!) 150/69, pulse 55, temperature 97.8  F (36.6  C), temperature source Oral, resp. rate 20, height 1.575 m (5' 2\"), weight 49.1 kg (108 lb 3.9 oz), SpO2 (!) 89%, not currently breastfeeding.  Vitals:    11/28/24 0626 11/29/24 0543 12/01/24 0637   Weight: 43.6 kg (96 lb 1.9 oz) 50.8 kg (111 lb 15.9 oz) 49.1 kg (108 lb 3.9 oz)     Vital Signs with Ranges  Temp:  [97.4  F (36.3  C)-97.8  F (36.6  C)] 97.8  F (36.6  C)  Pulse:  [48-55] 55  Resp:  [14-20] 20  BP: (146-155)/(68-90) 150/69  SpO2:  [89 %-94 %] 89 %  I/O's Last 24 hours  I/O last 3 completed shifts:  In: 1355 [P.O.:1355]  Out: 200 [Urine:200]    Constitutional: Awake, " alert, cooperative, no apparent distress  Respiratory: Clear to auscultation bilaterally, no crackles or wheezing  Cardiovascular: Regular rate and rhythm, normal S1 and S2   GI: Normal bowel sounds, soft, non-distended, non-tender  Skin/Integumen:    Other:      Medications   All medications were reviewed.  Current Facility-Administered Medications   Medication Dose Route Frequency Provider Last Rate Last Admin    sodium chloride 2% infusion   Intravenous Continuous Nereida Vaz MD         Current Facility-Administered Medications   Medication Dose Route Frequency Provider Last Rate Last Admin    amLODIPine (NORVASC) tablet 10 mg  10 mg Oral Daily Yoli Huizar MD   10 mg at 12/01/24 0859    apixaban ANTICOAGULANT (ELIQUIS) tablet 2.5 mg  2.5 mg Oral BID Yoli Huizar MD   2.5 mg at 12/01/24 0715    carvedilol (COREG) tablet 12.5 mg  12.5 mg Oral BID w/meals Yoli Huizar MD   12.5 mg at 11/29/24 2152    [Held by provider] chlorthalidone (HYGROTON) tablet 25 mg  25 mg Oral Daily Lisa Zeng PA-C   25 mg at 11/28/24 0809    clopidogrel (PLAVIX) tablet 75 mg  75 mg Oral Daily Marcin White MD   75 mg at 12/01/24 1256    fluticasone (FLONASE) 50 MCG/ACT spray 1 spray  1 spray Both Nostrils Daily Liane Huertas MD   1 spray at 11/30/24 0917    fluticasone-vilanterol (BREO ELLIPTA) 200-25 MCG/ACT inhaler 1 puff  1 puff Inhalation Daily Marcin White MD   1 puff at 12/01/24 0900    gabapentin (NEURONTIN) capsule 200 mg  200 mg Oral At Bedtime Marcin White MD   200 mg at 11/30/24 2112    ipratropium - albuterol 0.5 mg/2.5 mg/3 mL (DUONEB) neb solution 3 mL  3 mL Nebulization 4x daily Eli Peraza MD   3 mL at 12/01/24 0713    isosorbide mononitrate (IMDUR) 24 hr tablet 120 mg  120 mg Oral Daily Yoli Huizar MD   120 mg at 12/01/24 0859    nicotine (NICODERM CQ) 21 MG/24HR 24 hr patch 1 patch  1 patch Transdermal Daily Carlos Hunter MD   1  patch at 12/01/24 0900    Followed by    [START ON 1/2/2025] nicotine (NICODERM CQ) 14 MG/24HR 24 hr patch 1 patch  1 patch Transdermal Daily Carlos Hunter MD        Followed by    [START ON 1/30/2025] nicotine (NICODERM CQ) 7 MG/24HR 24 hr patch 1 patch  1 patch Transdermal Daily Carlos Hunter MD        polyethylene glycol (MIRALAX) Packet 17 g  17 g Oral Daily Yoli Huizar MD   17 g at 11/30/24 0912    rosuvastatin (CRESTOR) tablet 10 mg  10 mg Oral At Bedtime Marcin White MD   10 mg at 11/30/24 2112    sodium chloride (PF) 0.9% PF flush 3 mL  3 mL Intracatheter Q8H Marcin White MD   3 mL at 11/30/24 2103    sodium zirconium cyclosilicate (LOKELMA) packet 10 g  10 g Oral TID Marcin White MD   10 g at 12/01/24 0039    [Held by provider] valsartan (DIOVAN) tablet 40 mg  40 mg Oral Daily Lisa Zeng PA-C   40 mg at 11/28/24 0828        Data   Recent Labs   Lab 12/01/24  0509 11/30/24  0646 11/29/24  0650   WBC 8.8 9.4 10.4   HGB 10.2* 10.6* 10.9*   MCV 80 82 83    208 234   * 123* 128*   POTASSIUM 3.9 5.4* 5.0   CHLORIDE 82* 84* 90*   CO2 21* 21* 21*   .3* 125.3* 116.0*   CR 3.36* 3.78* 3.41*   ANIONGAP 18* 18* 17*   SONIA 7.0* 7.1* 7.4*   GLC 89 77 88       No results found for this or any previous visit (from the past 24 hours).    Levy Byers MD  Text Page  (7am to 6pm)

## 2024-12-01 NOTE — PLAN OF CARE
"    Vitals:   BP Readings from Last 1 Encounters:   12/01/24 (!) 150/69     Pulse Readings from Last 1 Encounters:   12/01/24 55     Wt Readings from Last 1 Encounters:   12/01/24 49.1 kg (108 lb 3.9 oz)     Ht Readings from Last 1 Encounters:   11/20/24 1.575 m (5' 2\")     Estimated body mass index is 19.8 kg/m  as calculated from the following:    Height as of this encounter: 1.575 m (5' 2\").    Weight as of this encounter: 49.1 kg (108 lb 3.9 oz).  Temp Readings from Last 1 Encounters:   12/01/24 97.8  F (36.6  C) (Oral)       Pain: Pain goal 0 Pain Rating 0 Effective pain medication/regimen none    CV Surgery Patient: No    Assessment    Resp: 20  Telemetry: SB  Neuro: intact  GI/: oliguria  Skin/Wounds: scattered bruising and dry shin  Lines/Drains: PIV  Activity: Ax1 gaitbelt and walker   Sleep: none   Abnormal Labs: Na 121    Aggression Stop Light: Green          Patient Care Plan: Na 2% IV fluids and recheck every 4 hours. Goal is 125     "

## 2024-12-01 NOTE — PLAN OF CARE
[id number]  Goal Outcome Evaluation:  Orientations: A/Ox4  Vitals/Pain: VSS on 2L at night  Tele: SB  Lines/Drains: Piv infusing per Emar.  Skin/Wounds: scattered bruising.   GI/: Oliguria.BM+,flatus+. denies n/v.  Renal diet.FR.   Labs: Abnormal/Trends, Electrolyte Replacement-   Ambulation/Assist: Ax1 GB/W  Sleep Quality: good  Plan: continue with plan of care.

## 2024-12-01 NOTE — PROGRESS NOTES
Pt arrived from [facility] at 1445 on her own wheelchair accompanied by two family members. Pt wants to watch her [employer] game and requested RN to return back to complete assessment. Sodium infusion started  at this time. Instructed pt and family to call back after the game is done to complete admission.

## 2024-12-01 NOTE — PROGRESS NOTES
A&Ox4; VSS; On 2lpm for sleep via NC; Ax1;p Uses own wheelchair; PIVC re-sited to Left lower arm; Ongoing d5/.45PNSS/NaHcos drip at 50ml/hr; Denies pain and refuses repositioning. Purewick in place. Oliguric; 100ml remains on catheter canister;    Ongoing plan of care per Hospitalist and Renal teams.

## 2024-12-02 ENCOUNTER — APPOINTMENT (OUTPATIENT)
Dept: PHYSICAL THERAPY | Facility: CLINIC | Age: 64
DRG: 280 | End: 2024-12-02
Attending: STUDENT IN AN ORGANIZED HEALTH CARE EDUCATION/TRAINING PROGRAM
Payer: COMMERCIAL

## 2024-12-02 LAB
ANION GAP SERPL CALCULATED.3IONS-SCNC: 17 MMOL/L (ref 7–15)
BUN SERPL-MCNC: 117.3 MG/DL (ref 8–23)
CALCIUM SERPL-MCNC: 7.1 MG/DL (ref 8.8–10.4)
CHLORIDE SERPL-SCNC: 88 MMOL/L (ref 98–107)
CREAT SERPL-MCNC: 2.94 MG/DL (ref 0.51–0.95)
EGFRCR SERPLBLD CKD-EPI 2021: 17 ML/MIN/1.73M2
ERYTHROCYTE [DISTWIDTH] IN BLOOD BY AUTOMATED COUNT: 14.5 % (ref 10–15)
GLUCOSE SERPL-MCNC: 82 MG/DL (ref 70–99)
HCO3 SERPL-SCNC: 22 MMOL/L (ref 22–29)
HCT VFR BLD AUTO: 30.1 % (ref 35–47)
HGB BLD-MCNC: 9.9 G/DL (ref 11.7–15.7)
MCH RBC QN AUTO: 26.4 PG (ref 26.5–33)
MCHC RBC AUTO-ENTMCNC: 32.9 G/DL (ref 31.5–36.5)
MCV RBC AUTO: 80 FL (ref 78–100)
PLATELET # BLD AUTO: 185 10E3/UL (ref 150–450)
POTASSIUM SERPL-SCNC: 3.6 MMOL/L (ref 3.4–5.3)
RBC # BLD AUTO: 3.75 10E6/UL (ref 3.8–5.2)
SODIUM SERPL-SCNC: 125 MMOL/L (ref 135–145)
SODIUM SERPL-SCNC: 127 MMOL/L (ref 135–145)
WBC # BLD AUTO: 7.7 10E3/UL (ref 4–11)

## 2024-12-02 PROCEDURE — 85014 HEMATOCRIT: CPT | Performed by: STUDENT IN AN ORGANIZED HEALTH CARE EDUCATION/TRAINING PROGRAM

## 2024-12-02 PROCEDURE — 36415 COLL VENOUS BLD VENIPUNCTURE: CPT | Performed by: INTERNAL MEDICINE

## 2024-12-02 PROCEDURE — 97530 THERAPEUTIC ACTIVITIES: CPT | Mod: GP | Performed by: PHYSICAL THERAPIST

## 2024-12-02 PROCEDURE — 250N000013 HC RX MED GY IP 250 OP 250 PS 637: Performed by: STUDENT IN AN ORGANIZED HEALTH CARE EDUCATION/TRAINING PROGRAM

## 2024-12-02 PROCEDURE — 99232 SBSQ HOSP IP/OBS MODERATE 35: CPT | Performed by: INTERNAL MEDICINE

## 2024-12-02 PROCEDURE — 85041 AUTOMATED RBC COUNT: CPT | Performed by: STUDENT IN AN ORGANIZED HEALTH CARE EDUCATION/TRAINING PROGRAM

## 2024-12-02 PROCEDURE — 97161 PT EVAL LOW COMPLEX 20 MIN: CPT | Mod: GP | Performed by: PHYSICAL THERAPIST

## 2024-12-02 PROCEDURE — 84295 ASSAY OF SERUM SODIUM: CPT | Performed by: INTERNAL MEDICINE

## 2024-12-02 PROCEDURE — 120N000001 HC R&B MED SURG/OB

## 2024-12-02 PROCEDURE — 97110 THERAPEUTIC EXERCISES: CPT | Mod: GP | Performed by: PHYSICAL THERAPIST

## 2024-12-02 PROCEDURE — 80048 BASIC METABOLIC PNL TOTAL CA: CPT | Performed by: INTERNAL MEDICINE

## 2024-12-02 RX ADMIN — CARVEDILOL 12.5 MG: 12.5 TABLET, FILM COATED ORAL at 18:36

## 2024-12-02 RX ADMIN — NICOTINE 1 PATCH: 21 PATCH, EXTENDED RELEASE TRANSDERMAL at 08:56

## 2024-12-02 RX ADMIN — GABAPENTIN 200 MG: 100 CAPSULE ORAL at 20:23

## 2024-12-02 RX ADMIN — APIXABAN 2.5 MG: 2.5 TABLET, FILM COATED ORAL at 18:36

## 2024-12-02 RX ADMIN — ROSUVASTATIN CALCIUM 10 MG: 10 TABLET, FILM COATED ORAL at 20:23

## 2024-12-02 RX ADMIN — FLUTICASONE FUROATE AND VILANTEROL TRIFENATATE 1 PUFF: 200; 25 POWDER RESPIRATORY (INHALATION) at 08:51

## 2024-12-02 RX ADMIN — AMLODIPINE BESYLATE 10 MG: 10 TABLET ORAL at 08:49

## 2024-12-02 RX ADMIN — ACETAMINOPHEN 1000 MG: 500 TABLET, FILM COATED ORAL at 20:27

## 2024-12-02 RX ADMIN — CARVEDILOL 12.5 MG: 12.5 TABLET, FILM COATED ORAL at 08:49

## 2024-12-02 RX ADMIN — CLOPIDOGREL BISULFATE 75 MG: 75 TABLET ORAL at 13:50

## 2024-12-02 RX ADMIN — APIXABAN 2.5 MG: 2.5 TABLET, FILM COATED ORAL at 06:09

## 2024-12-02 RX ADMIN — ISOSORBIDE MONONITRATE 120 MG: 60 TABLET, EXTENDED RELEASE ORAL at 08:49

## 2024-12-02 ASSESSMENT — ACTIVITIES OF DAILY LIVING (ADL)
ADLS_ACUITY_SCORE: 49

## 2024-12-02 NOTE — PROVIDER NOTIFICATION
Dr. Byers notified via "[employer] of pt's decision to order [employer] for dinner due to our dietary service not having enough gluten free options per pt report. Pt was reminded of her Renal diet and need for her to order meals from Dietary dept so she can keep to her diet but pt still chose to eat [employer].

## 2024-12-02 NOTE — PROGRESS NOTES
12/02/24 0719   Appointment Info   Signing Clinician's Name / Credentials (PT) Brittney Batista Poster, SPT   Living Environment   People in Home sibling(s);child(ifeanyi), adult   Current Living Arrangements house   Home Accessibility wheelchair accessible   Transportation Anticipated family or friend will provide   Living Environment Comments all needs can be met on single floor. pt has laundry on a different floor but has a lot of family support.   Self-Care   Usual Activity Tolerance good   Current Activity Tolerance moderate   Regular Exercise Yes   Activity/Exercise Type   (resistance band/dumbell exercises for UE)   Exercise Amount/Frequency daily   Equipment Currently Used at Home walker, standard;walker, rolling;prosthesis   Fall history within last six months yes   Number of times patient has fallen within last six months 1   General Information   Onset of Illness/Injury or Date of Surgery 12/06/24   Referring Physician Yoli Huizar MD   Patient/Family Therapy Goals Statement (PT) return home   Pertinent History of Current Problem (include personal factors and/or comorbidities that impact the POC) 64 year old female, on Plavix, with a history of COPD, hypertension, CHF, peripheral artery disease, NSTEMI, stage 4 CKD, and tobacco use who presents via EMS for evaluation of shortness of breath.   Existing Precautions/Restrictions fall   Cognition   Affect/Mental Status (Cognition) sad/depressed affect;WFL   Orientation Status (Cognition) oriented to;person;place;situation;time   Follows Commands (Cognition) WFL   Behavioral Issues uncooperative   Cognitive Status Comments pt followed well, but refused to use gatebelt.   Posture    Posture Forward head position   Range of Motion (ROM)   Range of Motion ROM is WFL   ROM Comment pt able to move BUE/BLE through AROM against gravity.   Strength (Manual Muscle Testing)   Strength (Manual Muscle Testing) strength is WFL   Strength Comments demonstrates  antigravity strength, however pt does show decreased activity tolerance.   Bed Mobility   Bed Mobility rolling left;scooting/bridging;supine-sit   Comment, (Bed Mobility) SBA   Transfers   Transfers wheelchair transfer   Wheelchair Transfer   Comment, (Wheelchair Transfer) SBA bed>wc via stand pivot/scoot   Balance   Balance Comments excellent seated balance while donning L shoe.   Coordination   Coordination Comments finger to nose: normal   Clinical Impression   Criteria for Skilled Therapeutic Intervention Yes, treatment indicated   PT Diagnosis (PT) unable to ambulate   Influenced by the following impairments general weakness, fatigue, new AKA with prosthesis   Functional limitations due to impairments Decreased functional independence with gait   Clinical Presentation (PT Evaluation Complexity) stable   Clinical Presentation Rationale see MR   Clinical Decision Making (Complexity) low complexity   Planned Therapy Interventions (PT) balance training;bed mobility training;home exercise program;neuromuscular re-education;patient/family education;postural re-education;ROM (range of motion);strengthening;stretching;transfer training;progressive activity/exercise;risk factor education;home program guidelines;wheelchair management/propulsion training   Risk & Benefits of therapy have been explained evaluation/treatment results reviewed;care plan/treatment goals reviewed;risks/benefits reviewed;current/potential barriers reviewed;participants voiced agreement with care plan;participants included;patient   PT Total Evaluation Time   PT Eval, Low Complexity Minutes (02404) 10   Physical Therapy Goals   PT Frequency One time eval and treatment only   PT Predicted Duration/Target Date for Goal Attainment 12/02/24   PT Goals Bed Mobility;Transfers;Wheelchair Mobility   PT: Bed Mobility Supervision/stand-by assist;Supine to/from sit;Rolling;Bridging;Goal Met   PT: Transfers Supervision/stand-by assist;Bed to/from  chair;Assistive device;Goal Met   PT: Wheelchair Mobility 150 feet;manual wheelchair;Goal Met   Interventions   Interventions Quick Adds Therapeutic Activity;Therapeutic Procedure   Therapeutic Procedure/Exercise   Ther. Procedure: strength, endurance, ROM, flexibillity Minutes (70595) 8   Symptoms Noted During/After Treatment fatigue   Treatment Detail/Skilled Intervention pt self propelled 350 ft with two short breaks at 120 ft. pt stated feeling weaker, EDU on importance of activity to maintain tolerance and benefit to aerobic activity. provided written AKA exercises and positioning packet for optimal care of newly amputated limb. pt already had handouts and aware of exercise. not receptive to further edu. did encoruge wc propulsion with staff. RN updated.   Therapeutic Activity   Therapeutic Activities: dynamic activities to improve functional performance Minutes (50921) 17   Symptoms Noted During/After Treatment Fatigue   Treatment Detail/Skilled Intervention pt in supine when entering the room. pt agreeable to PT. pt refused use of gatebelt and socks due to feeling restrained. RN notifed. increased time for equipment mangaement. pt SBA for bridging and donning depend with Siddharth to help pull depend up. SBA sup>sit EOB. SBA Stand pivot transfer completed to . pt returned to room seated in  with call light and table nearby. RN notifed pt did not want chair alarm.   PT Discharge Planning   PT Plan continue prosthetic training with outpatient PT; disch from IP PT   PT Discharge Recommendation (DC Rec) home with assist;home with outpatient physical therapy   PT Rationale for DC Rec Pt under baseline for functional activity tolerance but was able to complete all transfers wiht SBA. Handouts given for AKA positioning and exercises, pt had already recieved handouts-not super receptive to PT cues and education. Pt would benefit from continued skilled OP PT for gait trianing with new prosthetic. Will benefit form  "assist from family in home including supervision with mobility. No further acute PT needs at this time. Disch from acute PT. Pt should propel self in the wc mulitple times per day for ongoing act theron benefit.   PT Brief overview of current status SBA/supervision with transfers and wc mobility \"Goals of therapy will be to address safe mobility and make recs for d/c to next level of care. Pt and RN will continue to follow all falls risk precautions as documented by RN staff while hospitalized.\"   Physical Therapy Time and Intention   Timed Code Treatment Minutes 25   Total Session Time (sum of timed and untimed services) 35     "

## 2024-12-02 NOTE — PLAN OF CARE
Goal Outcome Evaluation:         SUMMARY: Admitted for evaluation of SOB.   DATE & TIME: 12/2/24 [id number]   Cognitive Concerns/ Orientation : A & O x 4, calm and cooperative    BEHAVIOR & AGGRESSION TOOL COLOR: Green   CIWA SCORE: NA    ABNL VS/O2: VSS on RA ex [id number], PRNs if SBP>180, Bradycardic with HR in 50s, Using 2 L NC intermittently for comfort.   MOBILITY: R. AKA due to vascular disease per patient-wheelchair bound at baseline. Able to T&R independently and can transfer from bed to wheelchair with minimal assistance. Refusing GB with transfers.   PAIN MANAGMENT: Denies pain this shift  DIET: Renal, 1200 mL FR. Strict I/Os.   BOWEL/BLADDER: intermittent incontinence. Purewick in place  ABNL LAB/BG: Na 127, Creatinine 2.94, GFR 17.0, calcium 7.1, Hgb-9.9, BUN-117.3  DRAIN/DEVICES: PIV SL  TELEMETRY RHYTHM: NA   SKIN: dusky, blanchable redness to coccyx. Old scar to R. Upper thigh from previous surgeries   TESTS/PROCEDURES: NA   D/C DATE: Possibly 12/3  per MD note.   Discharge Barriers: pending sodium and creatinine improvement an discharge plan.   OTHER IMPORTANT INFO: Refusing nebs and stating that they are not working-sticky note for MD. Cardiology, nephrology, and PT consulted.

## 2024-12-02 NOTE — PROGRESS NOTES
Renal Medicine Progress Note            Assessment/Plan:     Shirley Hendricks is a 64 year old female who was admitted on 11/20/2024.      Assessment:  1) chronic kidney disease stage IIIb  Followed by [facility] nephrology, Dr. Barboza and DARREL Roberson, last visit 11/14/2024     Noted history LIMA June/July 2024 requiring dialysis, discontinued 6/24     Hyperkalemia last month, spironolactone discontinued with potassium 6.0.  Potassium normal this admission.    Creatinine worsened in-house after relative hypotensive episodes after escalating blood pressure medications/diuretics/valsartan.  Likely ischemic ATN.  Encouraged to see creatinine plateaued and improving.  Continue to hold diuretics.  Normal biventricular filling pressures on [facility]  No urgent indication for dialysis.    Measure urine output closely.  Continue with p.o. fluid restriction    2 hyponatremia-with acute renal failure.  Improved to 127 with 2% saline.    Continue p.o. fluid restriction as ordered.         3) hypertension: Blood pressure reasonable.  Could do with slightly higher perfusion pressures.  Continue to hold valsartan..     Daily renal panel.  If sodium stable and creatinine continues to improve, patient can be discharged tomorrow and follow-up in nephrology clinic as outpatient        Interval History:     Seen/examined.  Feels tired.  Did not sleep well.  No shortness of breath.  No nausea vomiting  She feels okay.   2% saline stopped at 2 AM.  Sodium this morning is better at 127.  Creatinine/BUN better as well.            Medications and Allergies:     Current Facility-Administered Medications   Medication Dose Route Frequency Provider Last Rate Last Admin    amLODIPine (NORVASC) tablet 10 mg  10 mg Oral Daily Yoli Huizar MD   10 mg at 12/02/24 0849    apixaban ANTICOAGULANT (ELIQUIS) tablet 2.5 mg  2.5 mg Oral BID Yoli Huizar MD   2.5 mg at 12/02/24 0609    carvedilol (COREG) tablet 12.5 mg  12.5 mg  Oral BID w/meals Yoli Huizar MD   12.5 mg at 12/02/24 0849    [Held by provider] chlorthalidone (HYGROTON) tablet 25 mg  25 mg Oral Daily Lisa Zeng PA-C   25 mg at 11/28/24 0809    clopidogrel (PLAVIX) tablet 75 mg  75 mg Oral Daily Marcin White MD   75 mg at 12/01/24 1256    fluticasone (FLONASE) 50 MCG/ACT spray 1 spray  1 spray Both Nostrils Daily Liane Huertas MD   1 spray at 11/30/24 0917    fluticasone-vilanterol (BREO ELLIPTA) 200-25 MCG/ACT inhaler 1 puff  1 puff Inhalation Daily Marcin White MD   1 puff at 12/02/24 0851    gabapentin (NEURONTIN) capsule 200 mg  200 mg Oral At Bedtime Marcin White MD   200 mg at 12/01/24 2034    ipratropium - albuterol 0.5 mg/2.5 mg/3 mL (DUONEB) neb solution 3 mL  3 mL Nebulization 4x daily Eli Peraza MD   3 mL at 12/01/24 0713    isosorbide mononitrate (IMDUR) 24 hr tablet 120 mg  120 mg Oral Daily Yoli Huizar MD   120 mg at 12/02/24 0849    nicotine (NICODERM CQ) 21 MG/24HR 24 hr patch 1 patch  1 patch Transdermal Daily Carlos Hunter MD   1 patch at 12/02/24 0856    Followed by    [START ON 1/2/2025] nicotine (NICODERM CQ) 14 MG/24HR 24 hr patch 1 patch  1 patch Transdermal Daily Carlos Hunter MD        Followed by    [START ON 1/30/2025] nicotine (NICODERM CQ) 7 MG/24HR 24 hr patch 1 patch  1 patch Transdermal Daily Carlos Hunter MD        polyethylene glycol (MIRALAX) Packet 17 g  17 g Oral Daily Yoli Huizar MD   17 g at 11/30/24 0912    rosuvastatin (CRESTOR) tablet 10 mg  10 mg Oral At Bedtime Marcin White MD   10 mg at 12/01/24 2034    sodium chloride (PF) 0.9% PF flush 3 mL  3 mL Intracatheter Q8H Marcin White MD   3 mL at 12/01/24 1447    [Held by provider] valsartan (DIOVAN) tablet 40 mg  40 mg Oral Daily Lisa Zeng PA-C   40 mg at 11/28/24 0828        Allergies   Allergen Reactions    Contrast Dye Anaphylaxis     Pt reported facial and throat swelling with prior CT  "contrast    Pantoprazole      Protonix caused diffuse edema    Chantix [Varenicline]      Terrible dreams    Gluten Meal GI Disturbance     Pt has celiac disease. Can not have gluten    Penicillins Itching and Rash            Physical Exam:   Vitals were reviewed  BP (!) 170/64 (BP Location: Right arm)   Pulse 60   Temp 97.9  F (36.6  C) (Oral)   Resp 19   Ht 1.575 m (5' 2\")   Wt 54 kg (119 lb 0.8 oz)   LMP  (LMP Unknown)   SpO2 90%   BMI 21.77 kg/m      Wt Readings from Last 3 Encounters:   12/02/24 54 kg (119 lb 0.8 oz)   09/23/24 44.8 kg (98 lb 12.8 oz)   08/15/24 47.2 kg (104 lb)           GENERAL: In no distress  HEENT:  Normocephalic. No gross abnormalities  CV: RRR, 1/6 systolic murmur, no dependent edema or in left lower extremity  No edema noted  RESP: Clear bilaterally with good efforts  GI: Abdomen soft/nt/nd, BS normal.   MUSCULOSKELETAL: Right leg amputation.  No edema  SKIN: no suspicious lesions or rashes, dry to touch  PSYCH: mood good, affect appropriate           Data:     BMP  Recent Labs   Lab 12/02/24  0643 12/02/24  0049 12/01/24  2106 12/01/24  1707 12/01/24  0509 11/30/24  0646 11/29/24  0650   * 125* 124* 124* 121* 123* 128*   POTASSIUM 3.6  --   --   --  3.9 5.4* 5.0   CHLORIDE 88*  --   --   --  82* 84* 90*   SONIA 7.1*  --   --   --  7.0* 7.1* 7.4*   CO2 22  --   --   --  21* 21* 21*   .3*  --   --   --  122.3* 125.3* 116.0*   CR 2.94*  --   --   --  3.36* 3.78* 3.41*   GLC 82  --   --   --  89 77 88     CBC  Recent Labs   Lab 12/02/24  0643 12/01/24  0509 11/30/24  0646 11/29/24  0650   WBC 7.7 8.8 9.4 10.4   HGB 9.9* 10.2* 10.6* 10.9*   HCT 30.1* 29.9* 32.4* 33.0*   MCV 80 80 82 83    215 208 234     Lab Results   Component Value Date    AST 16 11/21/2024    ALT 15 11/21/2024    ALKPHOS 66 11/21/2024    BILITOTAL 0.2 11/21/2024    BILICONJ 0.0 01/23/2004     Lab Results   Component Value Date    INR 1.31 (H) 04/11/2024       Attestation:  I have reviewed " today's vital signs, notes, medications, labs and imaging.    Nereida Vaz MD  [facility] Nephrology  Office: [phone]

## 2024-12-02 NOTE — ANESTHESIA CARE TRANSFER NOTE
Patient: Shirley Hendricks    Procedure: Procedure(s):  RIGHT FEMORAL TO POPLITEAL GRAFT THROMBECTOMY  Or Angiogram, Lower Extremity       Diagnosis: Critical ischemia of extremity with history of revascularization of same extremity (H) [I70.229, Z98.890]  Diagnosis Additional Information: No value filed.    Anesthesia Type:   General     Note:    Oropharynx: oropharynx clear of all foreign objects and spontaneously breathing  Level of Consciousness: drowsy  Oxygen Supplementation: face mask  Level of Supplemental Oxygen (L/min / FiO2): 8  Independent Airway: airway patency satisfactory and stable  Dentition: dentition unchanged  Vital Signs Stable: post-procedure vital signs reviewed and stable  Report to RN Given: handoff report given  Patient transferred to: PACU    Handoff Report: Identifed the Patient, Identified the Reponsible Provider, Reviewed the pertinent medical history, Discussed the surgical course, Reviewed Intra-OP anesthesia mangement and issues during anesthesia, Set expectations for post-procedure period and Allowed opportunity for questions and acknowledgement of understanding      Vitals:  Vitals Value Taken Time   /78 10/11/23 1159   Temp     Pulse 65 10/11/23 1201   Resp 39 10/11/23 1201   SpO2 100 % 10/11/23 1201   Vitals shown include unfiled device data.    Electronically Signed By: NOELLE Arroyo CRNA  October 11, 2023  12:02 PM     Venipuncture Blood Specimen Collection  Venipuncture performed in RT ARM by Aj Holman with good hemostasis. Patient tolerated the procedure well without complications.   12/02/24   Aj Holman

## 2024-12-02 NOTE — PLAN OF CARE
Goal Outcome Evaluation:       SUMMARY: Admitted for evaluation of SOB.   DATE & TIME: 12/1/24-12/2/24 8995-7078   Cognitive Concerns/ Orientation : A & O x 4, calm and cooperative    BEHAVIOR & AGGRESSION TOOL COLOR: Green   CIWA SCORE: NA    ABNL VS/O2: VSS on RA ex [id number] systolic, has been trending in this range, R>L. PRNs if >180. Using 2 L NC intermittently when sleeping for comfort. Denied SOB.   MOBILITY: R.AKA due to vascular disease per patient-wheelchair bound at baseline. Able to T&R independently and can transfer from bed to wheelchair with minimal assistance. Refusing GB with transfers.   PAIN MANAGMENT: otoniel pain this shift  DIET: Renal, 1200 mL FR. Strict I/Os.   BOWEL/BLADDER: intermittent incontinence. Purewick in place  ABNL LAB/BG: Na 125 NA 2% stopped at 0200 per orders; creatinine 3.36, GFR 14.0, calcium 7.0, hgb 10.2, urea 122.3  DRAIN/DEVICES: PIV Sl   TELEMETRY RHYTHM: NA   SKIN: dusky, blanchable redness to coccyx. Old scar to R. Upper thigh from previous surgeries   TESTS/PROCEDURES: NA   D/C DATE: pending clinical improvement 2-3 days per MD note.   Discharge Barriers: pending sodium and creatinine improvement an discharge plan.   OTHER IMPORTANT INFO: Refusing nebs and stating that they are not working-sticky note for MD. Cardiology, nephrology, and PT consulted.
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
## 2024-12-02 NOTE — PLAN OF CARE
Goal Outcome Evaluation:      Plan of Care Reviewed With: patient    SUMMARY: Admitted for evaluation of SOB.   DATE & TIME: 12/1/2024, [id number]   Cognitive Concerns/ Orientation : A & O x 4, calm and cooperative    BEHAVIOR & AGGRESSION TOOL COLOR: Green   CIWA SCORE: NA    ABNL VS/O2: VSS on RA ex [id number] systolic, has been trending in this range, R>L. asymptomatic. PRNs if >180. Using 2 L NC intermittently when sleeping for comfort. Denied SOB.   MOBILITY: R.AKA due to vascular disease per patient-wheelchair bound at baseline. Able to T&R independently and can transfer from bed to wheelchair with minimal assistance. Refusing GB with transfers.   PAIN MANAGMENT:   DIET: Renal, tolerating, 1200 mL FR. Strict I/Os   BOWEL/BLADDER: BS active x 4, intermittent incontinence. Requested purewick  ABNL LAB/BG: Na 121, 124, 124 (Q4hr checks while on infusion, next check ordered for 0100); creatinine 3.36, GFR 14.0, calcium 7.0, hgb 10.2, urea 122.3  DRAIN/DEVICES: PIV infusing sodium chloride 2% infusion @ 40 mL/hr. Order to stop once sodium is 125 or more.   TELEMETRY RHYTHM: NA   SKIN: dusky, blanchable redness to coccyx. Old scar to R. Upper thigh from previous surgeries   TESTS/PROCEDURES: NA   D/C DATE: pending clinical improvement 2-3 days per MD note.   Discharge Barriers: pending sodium and creatinine improvement an discharge plan.   OTHER IMPORTANT INFO: Refusing nebs and stating that they are not working-left sticky note for MD. Cardiology, nephrology, and PT consulted.

## 2024-12-03 ENCOUNTER — APPOINTMENT (OUTPATIENT)
Dept: GENERAL RADIOLOGY | Facility: CLINIC | Age: 64
DRG: 280 | End: 2024-12-03
Attending: INTERNAL MEDICINE
Payer: COMMERCIAL

## 2024-12-03 LAB
ANION GAP SERPL CALCULATED.3IONS-SCNC: 16 MMOL/L (ref 7–15)
ATRIAL RATE - MUSE: 50 BPM
BUN SERPL-MCNC: 122.7 MG/DL (ref 8–23)
CALCIUM SERPL-MCNC: 7.5 MG/DL (ref 8.8–10.4)
CHLORIDE SERPL-SCNC: 89 MMOL/L (ref 98–107)
CREAT SERPL-MCNC: 2.67 MG/DL (ref 0.51–0.95)
DIASTOLIC BLOOD PRESSURE - MUSE: NORMAL MMHG
EGFRCR SERPLBLD CKD-EPI 2021: 19 ML/MIN/1.73M2
ERYTHROCYTE [DISTWIDTH] IN BLOOD BY AUTOMATED COUNT: 14.7 % (ref 10–15)
GLUCOSE SERPL-MCNC: 89 MG/DL (ref 70–99)
HCO3 SERPL-SCNC: 22 MMOL/L (ref 22–29)
HCT VFR BLD AUTO: 26.7 % (ref 35–47)
HGB BLD-MCNC: 9.3 G/DL (ref 11.7–15.7)
INTERPRETATION ECG - MUSE: NORMAL
MCH RBC QN AUTO: 27.8 PG (ref 26.5–33)
MCHC RBC AUTO-ENTMCNC: 34.8 G/DL (ref 31.5–36.5)
MCV RBC AUTO: 80 FL (ref 78–100)
P AXIS - MUSE: -7 DEGREES
PLATELET # BLD AUTO: 145 10E3/UL (ref 150–450)
POTASSIUM SERPL-SCNC: 4.1 MMOL/L (ref 3.4–5.3)
PR INTERVAL - MUSE: 174 MS
QRS DURATION - MUSE: 98 MS
QT - MUSE: 454 MS
QTC - MUSE: 413 MS
R AXIS - MUSE: -42 DEGREES
RBC # BLD AUTO: 3.35 10E6/UL (ref 3.8–5.2)
SODIUM SERPL-SCNC: 127 MMOL/L (ref 135–145)
SYSTOLIC BLOOD PRESSURE - MUSE: NORMAL MMHG
T AXIS - MUSE: 53 DEGREES
VENTRICULAR RATE- MUSE: 50 BPM
WBC # BLD AUTO: 7.5 10E3/UL (ref 4–11)

## 2024-12-03 PROCEDURE — 71046 X-RAY EXAM CHEST 2 VIEWS: CPT

## 2024-12-03 PROCEDURE — 250N000013 HC RX MED GY IP 250 OP 250 PS 637: Performed by: STUDENT IN AN ORGANIZED HEALTH CARE EDUCATION/TRAINING PROGRAM

## 2024-12-03 PROCEDURE — 80048 BASIC METABOLIC PNL TOTAL CA: CPT | Performed by: INTERNAL MEDICINE

## 2024-12-03 PROCEDURE — 99233 SBSQ HOSP IP/OBS HIGH 50: CPT | Performed by: INTERNAL MEDICINE

## 2024-12-03 PROCEDURE — 120N000001 HC R&B MED SURG/OB

## 2024-12-03 PROCEDURE — 36415 COLL VENOUS BLD VENIPUNCTURE: CPT | Performed by: STUDENT IN AN ORGANIZED HEALTH CARE EDUCATION/TRAINING PROGRAM

## 2024-12-03 PROCEDURE — 999N000157 HC STATISTIC RCP TIME EA 10 MIN

## 2024-12-03 PROCEDURE — 85014 HEMATOCRIT: CPT | Performed by: STUDENT IN AN ORGANIZED HEALTH CARE EDUCATION/TRAINING PROGRAM

## 2024-12-03 PROCEDURE — 94640 AIRWAY INHALATION TREATMENT: CPT

## 2024-12-03 PROCEDURE — 99232 SBSQ HOSP IP/OBS MODERATE 35: CPT | Performed by: INTERNAL MEDICINE

## 2024-12-03 PROCEDURE — 250N000009 HC RX 250: Performed by: STUDENT IN AN ORGANIZED HEALTH CARE EDUCATION/TRAINING PROGRAM

## 2024-12-03 PROCEDURE — 82435 ASSAY OF BLOOD CHLORIDE: CPT | Performed by: INTERNAL MEDICINE

## 2024-12-03 RX ORDER — CARVEDILOL 12.5 MG/1
12.5 TABLET ORAL 2 TIMES DAILY WITH MEALS
Qty: 60 TABLET | Refills: 1 | Status: SHIPPED | OUTPATIENT
Start: 2024-12-03

## 2024-12-03 RX ORDER — ISOSORBIDE MONONITRATE 120 MG/1
120 TABLET, EXTENDED RELEASE ORAL DAILY
Qty: 30 TABLET | Refills: 1 | Status: SHIPPED | OUTPATIENT
Start: 2024-12-04

## 2024-12-03 RX ORDER — ALBUTEROL SULFATE 90 UG/1
2 INHALANT RESPIRATORY (INHALATION) EVERY 4 HOURS PRN
Qty: 18 G | Refills: 0 | Status: SHIPPED | OUTPATIENT
Start: 2024-12-03

## 2024-12-03 RX ADMIN — APIXABAN 2.5 MG: 2.5 TABLET, FILM COATED ORAL at 06:15

## 2024-12-03 RX ADMIN — ISOSORBIDE MONONITRATE 120 MG: 60 TABLET, EXTENDED RELEASE ORAL at 08:34

## 2024-12-03 RX ADMIN — ROSUVASTATIN CALCIUM 10 MG: 10 TABLET, FILM COATED ORAL at 20:57

## 2024-12-03 RX ADMIN — FLUTICASONE FUROATE AND VILANTEROL TRIFENATATE 1 PUFF: 200; 25 POWDER RESPIRATORY (INHALATION) at 08:34

## 2024-12-03 RX ADMIN — FLUTICASONE PROPIONATE 1 SPRAY: 50 SPRAY, METERED NASAL at 08:34

## 2024-12-03 RX ADMIN — CARVEDILOL 12.5 MG: 12.5 TABLET, FILM COATED ORAL at 08:34

## 2024-12-03 RX ADMIN — POLYETHYLENE GLYCOL 3350 17 G: 17 POWDER, FOR SOLUTION ORAL at 08:35

## 2024-12-03 RX ADMIN — CARVEDILOL 12.5 MG: 12.5 TABLET, FILM COATED ORAL at 17:12

## 2024-12-03 RX ADMIN — CLOPIDOGREL BISULFATE 75 MG: 75 TABLET ORAL at 12:39

## 2024-12-03 RX ADMIN — NICOTINE 1 PATCH: 21 PATCH, EXTENDED RELEASE TRANSDERMAL at 08:35

## 2024-12-03 RX ADMIN — APIXABAN 2.5 MG: 2.5 TABLET, FILM COATED ORAL at 17:12

## 2024-12-03 RX ADMIN — LEVALBUTEROL HYDROCHLORIDE 1.25 MG: 1.25 SOLUTION RESPIRATORY (INHALATION) at 11:51

## 2024-12-03 RX ADMIN — AMLODIPINE BESYLATE 10 MG: 10 TABLET ORAL at 08:34

## 2024-12-03 RX ADMIN — GABAPENTIN 200 MG: 100 CAPSULE ORAL at 20:56

## 2024-12-03 RX ADMIN — ACETAMINOPHEN 1000 MG: 500 TABLET, FILM COATED ORAL at 20:56

## 2024-12-03 ASSESSMENT — ACTIVITIES OF DAILY LIVING (ADL)
ADLS_ACUITY_SCORE: 48
ADLS_ACUITY_SCORE: 49
ADLS_ACUITY_SCORE: 48
ADLS_ACUITY_SCORE: 49
ADLS_ACUITY_SCORE: 49
ADLS_ACUITY_SCORE: 48
ADLS_ACUITY_SCORE: 48
ADLS_ACUITY_SCORE: 49
ADLS_ACUITY_SCORE: 49
ADLS_ACUITY_SCORE: 48
ADLS_ACUITY_SCORE: 48
ADLS_ACUITY_SCORE: 49
ADLS_ACUITY_SCORE: 48
ADLS_ACUITY_SCORE: 48
ADLS_ACUITY_SCORE: 49
ADLS_ACUITY_SCORE: 48
ADLS_ACUITY_SCORE: 49
ADLS_ACUITY_SCORE: 49
ADLS_ACUITY_SCORE: 48

## 2024-12-03 NOTE — PROGRESS NOTES
Renal Medicine Progress Note            Assessment/Plan:     Shirley Hendricks is a 64 year old female who was admitted on 11/20/2024.      Assessment:  1) chronic kidney disease stage IIIb  Followed by [facility] nephrology, Dr. Barboza and DARREL Roberson, last visit 11/14/2024     Noted history LIMA June/July 2024 requiring dialysis, discontinued 6/24     Hyperkalemia last month, spironolactone discontinued with potassium 6.0.  Potassium normal this admission.    Creatinine worsened in-house after relative hypotensive episodes after escalating blood pressure medications/diuretics/valsartan.  Likely ischemic ATN.  Encouraged to see creatinine plateaued and improving.  Continue to hold diuretics.  Normal biventricular filling pressures on RHC.  Respiratory compromise more likely related to COPD.      2 hyponatremia-with acute renal failure.  Improved to 127 with 2% saline.    Continue p.o. fluid restriction as ordered.       3) hypertension: Blood pressure reasonable.  Could do with slightly higher perfusion pressures.  Continue to hold valsartan..       Okay to discharge from renal standpoint.  Continue to hold valsartan at discharge.  Continue to hold diuretics.  Reassess in outpatient for need for diuretics.  Currently no edema or signs of volume overload.  Recent right heart cath with normal biventricular pressures.  Continue amlodipine, Imdur  Labs with PCP within a week  Follow-up with [facility] nephrology within 2 weeks  Patient counseled extensively on low-sodium low potassium diet.  Follow daily weights at home    Discussed with primary team in person            Interval History:     Seen/examined.    Feels okay.  No shortness of breath but oxygen requirement is slightly up today.  Creatinine trending down.  Reports good urine output.  Sodium stable at 127.            Medications and Allergies:     Current Facility-Administered Medications   Medication Dose Route Frequency Provider Last Rate Last Admin     amLODIPine (NORVASC) tablet 10 mg  10 mg Oral Daily Yoli Huizar MD   10 mg at 12/03/24 0834    apixaban ANTICOAGULANT (ELIQUIS) tablet 2.5 mg  2.5 mg Oral BID Yoli Huizar MD   2.5 mg at 12/03/24 0615    carvedilol (COREG) tablet 12.5 mg  12.5 mg Oral BID w/meals Yoli Huizar MD   12.5 mg at 12/03/24 0834    clopidogrel (PLAVIX) tablet 75 mg  75 mg Oral Daily Marcin White MD   75 mg at 12/02/24 1350    fluticasone (FLONASE) 50 MCG/ACT spray 1 spray  1 spray Both Nostrils Daily Liane Huertas MD   1 spray at 12/03/24 0834    fluticasone-vilanterol (BREO ELLIPTA) 200-25 MCG/ACT inhaler 1 puff  1 puff Inhalation Daily Marcin White MD   1 puff at 12/03/24 0834    gabapentin (NEURONTIN) capsule 200 mg  200 mg Oral At Bedtime Marcin White MD   200 mg at 12/02/24 2023    isosorbide mononitrate (IMDUR) 24 hr tablet 120 mg  120 mg Oral Daily Yoli Huizar MD   120 mg at 12/03/24 0834    nicotine (NICODERM CQ) 21 MG/24HR 24 hr patch 1 patch  1 patch Transdermal Daily Carlos Hunter MD   1 patch at 12/03/24 0835    Followed by    [START ON 1/2/2025] nicotine (NICODERM CQ) 14 MG/24HR 24 hr patch 1 patch  1 patch Transdermal Daily Carlos Hunter MD        Followed by    [START ON 1/30/2025] nicotine (NICODERM CQ) 7 MG/24HR 24 hr patch 1 patch  1 patch Transdermal Daily Carlos Hunter MD        polyethylene glycol (MIRALAX) Packet 17 g  17 g Oral Daily Yoli Huizar MD   17 g at 12/03/24 0835    rosuvastatin (CRESTOR) tablet 10 mg  10 mg Oral At Bedtime Marcin White MD   10 mg at 12/02/24 2023    sodium chloride (PF) 0.9% PF flush 3 mL  3 mL Intracatheter Q8H Marcin White MD   3 mL at 12/03/24 0616    [Held by provider] valsartan (DIOVAN) tablet 40 mg  40 mg Oral Daily Lisa Zeng PA-C   40 mg at 11/28/24 0828        Allergies   Allergen Reactions    Contrast Dye Anaphylaxis     Pt reported facial and throat swelling with  "prior CT contrast    Pantoprazole      Protonix caused diffuse edema    Chantix [Varenicline]      Terrible dreams    Gluten Meal GI Disturbance     Pt has celiac disease. Can not have gluten    Penicillins Itching and Rash            Physical Exam:   Vitals were reviewed  BP (!) 144/58 (BP Location: Right arm, Patient Position: Dangled, Cuff Size: Adult Regular)   Pulse 53   Temp 98  F (36.7  C) (Oral)   Resp 18   Ht 1.575 m (5' 2\")   Wt 54.8 kg (120 lb 13 oz)   LMP  (LMP Unknown)   SpO2 92%   BMI 22.10 kg/m      Wt Readings from Last 3 Encounters:   12/03/24 54.8 kg (120 lb 13 oz)   09/23/24 44.8 kg (98 lb 12.8 oz)   08/15/24 47.2 kg (104 lb)           GENERAL: In no distress  HEENT:  Normocephalic. No gross abnormalities  CV: RRR, 1/6 systolic murmur, no dependent edema or in left lower extremity  No edema noted  RESP: Clear bilaterally with good efforts  GI: Abdomen soft/nt/nd, BS normal.   MUSCULOSKELETAL: Right leg amputation.  No edema  SKIN: no suspicious lesions or rashes, dry to touch  PSYCH: mood good, affect appropriate           Data:     BMP  Recent Labs   Lab 12/03/24  0655 12/02/24  0643 12/02/24  0049 12/01/24  2106 12/01/24  1707 12/01/24  0509 11/30/24  0646   * 127* 125* 124*   < > 121* 123*   POTASSIUM 4.1 3.6  --   --   --  3.9 5.4*   CHLORIDE 89* 88*  --   --   --  82* 84*   SONIA 7.5* 7.1*  --   --   --  7.0* 7.1*   CO2 22 22  --   --   --  21* 21*   .7* 117.3*  --   --   --  122.3* 125.3*   CR 2.67* 2.94*  --   --   --  3.36* 3.78*   GLC 89 82  --   --   --  89 77    < > = values in this interval not displayed.     CBC  Recent Labs   Lab 12/03/24  0655 12/02/24  0643 12/01/24  0509 11/30/24  0646   WBC 7.5 7.7 8.8 9.4   HGB 9.3* 9.9* 10.2* 10.6*   HCT 26.7* 30.1* 29.9* 32.4*   MCV 80 80 80 82   * 185 215 208     Lab Results   Component Value Date    AST 16 11/21/2024    ALT 15 11/21/2024    ALKPHOS 66 11/21/2024    BILITOTAL 0.2 11/21/2024    BILICONJ 0.0 01/23/2004 "     Lab Results   Component Value Date    INR 1.31 (H) 04/11/2024       Attestation:  I have reviewed today's vital signs, notes, medications, labs and imaging.    Nereida Vaz MD  [facility] - Nephrology  Office: [phone]

## 2024-12-03 NOTE — PLAN OF CARE
Goal Outcome Evaluation:       SUMMARY: Admitted for evaluation of SOB.   DATE & TIME: 12/2/24  to 12/3/24 NOC  Cognitive Concerns/ Orientation : A & O x 4, calm and cooperative    BEHAVIOR & AGGRESSION TOOL COLOR: Green   CIWA SCORE: NA    ABNL VS/O2: VSS on RA ex [id number] systolic, has been trending in this range, R>L. PRNs if >180. O2 @ 3L at 88%  MOBILITY: R.AKA due to vascular disease per patient-wheelchair bound at baseline. Able to T&R independently and can transfer from bed to wheelchair with minimal assistance.   PAIN MANAGMENT: back pain  tylenol prn x1  DIET: Renal, 1200 mL FR. Strict I/Os.   BOWEL/BLADDER: intermittent incontinence  ABNL LAB/BG: Creatinine 3.36, GFR 14.0, calcium 7.0, hgb 10.2, urea 122.3  DRAIN/DEVICES: PIV SL  TELEMETRY RHYTHM: NA   SKIN: dusky, blanchable redness to coccyx. Old scar to R. Upper thigh from previous surgeries   TESTS/PROCEDURES: NA   D/C DATE: pending clinical improvement 2-3 days per MD note.   Discharge Barriers: pending sodium and creatinine improvement an discharge plan.   OTHER IMPORTANT INFO: Refusing nebs and stating that they are not working-sticky note for MD. Cardiology, nephrology, and PT consulted.

## 2024-12-03 NOTE — PROGRESS NOTES
[facility]    Medicine Progress Note - Hospitalist Service        Date of Admission:  11/20/2024  2:41 PM    Assessment & Plan:   Shirley Hendricks is a 64 year old female, on Plavix, with a history of COPD, hypertension, CHF, peripheral artery disease, NSTEMI, stage 4 CKD, and tobacco use who is being admitted for evaluation of shortness of breath.      Acute hypoxic respiratory failure-multifactorial  Acute exacerbation of heart failure with preserved EF  Moderate to severe left pleural effusion status post thoracentesis  Type II NSTEMI likely due to demand due to heart failure exacerbation  -Presents with 2-day history of increasing shortness of breath and a cough.  On admission chest x-ray shows a large left pleural effusion with compressive atelectasis.  There is also diffuse interstitial opacities consistent with pulmonary edema.    -Labs pertinent for [place] proBNP [place] 08952   -Ultrasound guided thoracentesis on 11/20/24 s/p 1 liters of straw-colored fluid were removed   -Repeat CT showed bilateral pleural effusions with thoracentesis 1.2 L left and 1.5 L right side.  -Lights criteria consistent with transudate. Cultures negative. Cytology negative for malignancy  -Completed 5 days of corticosteroid  -TTE done during this hospitalization on 11/22/2024 showed LVEF normal at 55 to 60%.  RV function normal.  Mild 1+ MR, 1+ TR.  Mild pulmonary hypertension.  Compared to prior study there is no significant change  -Completed treatment for community-acquired pneumonia with cefuroxime and azithromycin  -Right heart catheterization done on 11/27/24 showed normal pressures  -Still requiring oxygen at 3-4 L, continue to wean oxygen as able.  -Repeat chest x-ray today    Community Acquired Pnuemonia  COPD with possible acute exacerbation  -Patient initially presented with acute hypoxic respiratory failure, initially felt to be due to acute exacerbation of heart failure with preserved EF  -Initially she was  diuresed, also received thoracentesis with improvement in oxygenation however now continues to require oxygen at 2-3 L  -Right heart cath showed normal pressures, cardiology feels that there is a significant component of COPD which is contributing to her symptoms and hypoxia  -Already completed 5 days steroid course and a round of antibiotics  -Last seen by pulmonary on 11/27/2024, they recommended DuoNebs, nebulizer, steroid and diuresis per cardiology/primary team.  -Depending on her oxygenation and repeat chest x-ray finding, will reach out to pulmonary again tomorrow  -I expect that she will need home oxygen for the short-term, probably indefinitely    Hypertensive urgency  New onset A-fib with RVR  Hyperlipidemia  History of coronary artery disease  -Initially found to be in A-fib, now converted to normal sinus rhythm  -Continue apixaban, currently on 2.5 mg twice a day based on body weight and renal function  -Prior to admission atenolol changed to carvedilol due to refractory hypertension  -Continue amlodipine 10 mg daily  -Continue Plavix which she takes for both history of coronary artery disease and peripheral arterial disease  -Isosorbide mononitrate increased to 120 mg daily  -Continue Crestor    Peripheral vascular disease with history of 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 2024  -Continue Plavix and statin     Hyponatremia  -Sodium decreased from 133 on presentation to 121   -Nephrology following  -Did require 2% saline initially, currently improved to 127  -Continue 2 L fluid restriction    Acute kidney injury on CKD stage III  Suspected ATN secondary to hypotension and ARB + diuretic  Borderline hyperkalemia  -Creatinine on admission of 1.71, which worsened with diuresis and relative hypotension.  -Creatinine peaked at 3.78 and now downtrending, good urine output  -Creatinine 2.67 today  -Continue to hold diuretics and ARB  -Discussed with nephrology on 12/3, continue to hold diuretics and reassess initiation of  diuretics as outpatient.    GERD  -Continue with PTA famotidine     Anemia of Chronic Disease  Baseline hemoglobin has been between 8-9 over the past few months  -Hemoglobin overall stable between 9-10     History of thrombocytopenia  - Platelet count normal on admission, slightly lower today at 145, recheck again tomorrow     Tobacco use  - Continue with nicotine patch  - Counselled on cessation      Diet: Fluid restriction 1200 ML FLUID  Renal Diet (non-dialysis)     DVT Prophylaxis: DOAC   Alvarez Catheter: Not present  Code Status: Full Code     Disposition Plan       Expected Discharge Date: 12/04/2024,  3:00 PM    Destination: home with family        Entered: Vadim Ramirez MD 12/03/2024, 2:08 PM        Medically Ready for Discharge: Anticipated Tomorrow if oxygenation improves and chest x-ray shows improvement       Clinically Significant Risk Factors         # Hyponatremia: Lowest Na = 124 mmol/L in last 2 days, will monitor as appropriate  # Hypochloremia: Lowest Cl = 88 mmol/L in last 2 days, will monitor as appropriate      # Hypoalbuminemia: Lowest albumin = 3 g/dL at 11/21/2024  4:42 AM, will monitor as appropriate     # Hypertension: Noted on problem list             # Moderate Malnutrition: based on nutrition assessment    # Financial/Environmental Concerns: none           The patient's care was discussed with the Bedside Nurse and Patient.    Medical Decision Making       **CLEAR ALL SELECTIONS**      Labs/Imaging Reviewed:  See Information above and Data section below    Time SPENT BY ME on the date of service doing chart review, history, exam, documentation & further activities per the note:  50 MINUTES    Chart documentation was completed, in part, with [employer] voice-recognition software. Even though reviewed, some grammatical, spelling, and word errors may remain.    Vadim Ramirez MD  Hospitalist Service  [facility]  Text Page 7AM-6PM  Securely message with the  "[employer] Web Console (learn more here)  Text page via [facility] Paging/Directory    ______________________________________________________________________    Interval History   Patient is frustrated about the ongoing need for oxygen which is about 3-4 L.  It got worse overnight after she got worked up about being in the hospital and not getting her desired gluten-free diet.  Patient endorses mild dyspnea.  No chest pain.  Renal function is improving.    Data reviewed today: I reviewed all medications, new labs and imaging results over the last 24 hours. I personally reviewed no images or EKG's today.    Physical Exam   Vital signs:  Temp: 98  F (36.7  C) Temp src: Oral BP: (!) 144/58 Pulse: 53   Resp: 18 SpO2: 94 % O2 Device: Nasal cannula Oxygen Delivery: 3 LPM Height: 157.5 cm (5' 2\") Weight: 54.8 kg (120 lb 13 oz)  Estimated body mass index is 22.1 kg/m  as calculated from the following:    Height as of this encounter: 1.575 m (5' 2\").    Weight as of this encounter: 54.8 kg (120 lb 13 oz).      Wt Readings from Last 2 Encounters:   12/03/24 54.8 kg (120 lb 13 oz)   09/23/24 44.8 kg (98 lb 12.8 oz)       Gen: AAOX3, NAD, comfortable  HEENT: Supple neck, moist oral mucosa, no pallor  Resp: Diminished air entry at both the bases, normal effort of breathing  CVS: RRR, no murmur  Abd/GI: Soft, non-tender. BS- normoactive.    Skin: Warm, dry no rashes  MSK: no pedal edema  Neuro- CN- intact. No focal deficits.        Data   Recent Labs   Lab 12/03/24  0655 12/02/24  0643 12/02/24  0049 12/01/24  1707 12/01/24  0509   WBC 7.5 7.7  --   --  8.8   HGB 9.3* 9.9*  --   --  10.2*   MCV 80 80  --   --  80   * 185  --   --  215   * 127* 125*   < > 121*   POTASSIUM 4.1 3.6  --   --  3.9   CHLORIDE 89* 88*  --   --  82*   CO2 22 22  --   --  21*   .7* 117.3*  --   --  122.3*   CR 2.67* 2.94*  --   --  3.36*   ANIONGAP 16* 17*  --   --  18*   SONIA 7.5* 7.1*  --   --  7.0*   GLC 89 82  --   --  89    < > = values in " this interval not displayed.       No results found for this or any previous visit (from the past 24 hours).  Medications   Current Facility-Administered Medications   Medication Dose Route Frequency Provider Last Rate Last Admin     Current Facility-Administered Medications   Medication Dose Route Frequency Provider Last Rate Last Admin    amLODIPine (NORVASC) tablet 10 mg  10 mg Oral Daily Yoli Huizar MD   10 mg at 12/03/24 0834    apixaban ANTICOAGULANT (ELIQUIS) tablet 2.5 mg  2.5 mg Oral BID Yoli Huizar MD   2.5 mg at 12/03/24 0615    carvedilol (COREG) tablet 12.5 mg  12.5 mg Oral BID w/meals Yoli Huizar MD   12.5 mg at 12/03/24 0834    clopidogrel (PLAVIX) tablet 75 mg  75 mg Oral Daily Marcin White MD   75 mg at 12/03/24 1239    fluticasone (FLONASE) 50 MCG/ACT spray 1 spray  1 spray Both Nostrils Daily Liane Huertas MD   1 spray at 12/03/24 0834    fluticasone-vilanterol (BREO ELLIPTA) 200-25 MCG/ACT inhaler 1 puff  1 puff Inhalation Daily Marcin White MD   1 puff at 12/03/24 0834    gabapentin (NEURONTIN) capsule 200 mg  200 mg Oral At Bedtime Marcin White MD   200 mg at 12/02/24 2023    isosorbide mononitrate (IMDUR) 24 hr tablet 120 mg  120 mg Oral Daily Yoli Huizar MD   120 mg at 12/03/24 0834    nicotine (NICODERM CQ) 21 MG/24HR 24 hr patch 1 patch  1 patch Transdermal Daily Carlos Hunter MD   1 patch at 12/03/24 0835    Followed by    [START ON 1/2/2025] nicotine (NICODERM CQ) 14 MG/24HR 24 hr patch 1 patch  1 patch Transdermal Daily Carlos Hunter MD        Followed by    [START ON 1/30/2025] nicotine (NICODERM CQ) 7 MG/24HR 24 hr patch 1 patch  1 patch Transdermal Daily Carlos Hunter MD        polyethylene glycol (MIRALAX) Packet 17 g  17 g Oral Daily Yoli Huizar MD   17 g at 12/03/24 0835    rosuvastatin (CRESTOR) tablet 10 mg  10 mg Oral At Bedtime Marcin White MD   10 mg at 12/02/24 2023     sodium chloride (PF) 0.9% PF flush 3 mL  3 mL Intracatheter Q8H Macrin White MD   3 mL at 12/03/24 1239    [Held by provider] valsartan (DIOVAN) tablet 40 mg  40 mg Oral Daily Lisa Zeng PA-C   40 mg at 11/28/24 0816

## 2024-12-04 ENCOUNTER — APPOINTMENT (OUTPATIENT)
Dept: ULTRASOUND IMAGING | Facility: CLINIC | Age: 64
DRG: 280 | End: 2024-12-04
Attending: INTERNAL MEDICINE
Payer: COMMERCIAL

## 2024-12-04 LAB
ANION GAP SERPL CALCULATED.3IONS-SCNC: 14 MMOL/L (ref 7–15)
BUN SERPL-MCNC: 123.3 MG/DL (ref 8–23)
CALCIUM SERPL-MCNC: 7.8 MG/DL (ref 8.8–10.4)
CHLORIDE SERPL-SCNC: 92 MMOL/L (ref 98–107)
CREAT SERPL-MCNC: 2.41 MG/DL (ref 0.51–0.95)
EGFRCR SERPLBLD CKD-EPI 2021: 22 ML/MIN/1.73M2
ERYTHROCYTE [DISTWIDTH] IN BLOOD BY AUTOMATED COUNT: 14.7 % (ref 10–15)
GLUCOSE SERPL-MCNC: 78 MG/DL (ref 70–99)
HCO3 SERPL-SCNC: 23 MMOL/L (ref 22–29)
HCT VFR BLD AUTO: 28.1 % (ref 35–47)
HGB BLD-MCNC: 9.1 G/DL (ref 11.7–15.7)
INR PPP: 1.24 (ref 0.85–1.15)
MCH RBC QN AUTO: 26.7 PG (ref 26.5–33)
MCHC RBC AUTO-ENTMCNC: 32.4 G/DL (ref 31.5–36.5)
MCV RBC AUTO: 82 FL (ref 78–100)
NT-PROBNP SERPL-MCNC: 9289 PG/ML (ref 0–900)
PLATELET # BLD AUTO: 139 10E3/UL (ref 150–450)
PLATELET # BLD AUTO: 146 10E3/UL (ref 150–450)
POTASSIUM SERPL-SCNC: 4.1 MMOL/L (ref 3.4–5.3)
RBC # BLD AUTO: 3.41 10E6/UL (ref 3.8–5.2)
SODIUM SERPL-SCNC: 129 MMOL/L (ref 135–145)
WBC # BLD AUTO: 5.9 10E3/UL (ref 4–11)

## 2024-12-04 PROCEDURE — 120N000001 HC R&B MED SURG/OB

## 2024-12-04 PROCEDURE — 99233 SBSQ HOSP IP/OBS HIGH 50: CPT | Performed by: INTERNAL MEDICINE

## 2024-12-04 PROCEDURE — 272N000706 US THORACENTESIS

## 2024-12-04 PROCEDURE — 99232 SBSQ HOSP IP/OBS MODERATE 35: CPT | Performed by: INTERNAL MEDICINE

## 2024-12-04 PROCEDURE — 83880 ASSAY OF NATRIURETIC PEPTIDE: CPT | Performed by: STUDENT IN AN ORGANIZED HEALTH CARE EDUCATION/TRAINING PROGRAM

## 2024-12-04 PROCEDURE — 85018 HEMOGLOBIN: CPT | Performed by: STUDENT IN AN ORGANIZED HEALTH CARE EDUCATION/TRAINING PROGRAM

## 2024-12-04 PROCEDURE — 85049 AUTOMATED PLATELET COUNT: CPT | Performed by: INTERNAL MEDICINE

## 2024-12-04 PROCEDURE — 250N000009 HC RX 250: Performed by: STUDENT IN AN ORGANIZED HEALTH CARE EDUCATION/TRAINING PROGRAM

## 2024-12-04 PROCEDURE — 99233 SBSQ HOSP IP/OBS HIGH 50: CPT | Performed by: STUDENT IN AN ORGANIZED HEALTH CARE EDUCATION/TRAINING PROGRAM

## 2024-12-04 PROCEDURE — 250N000011 HC RX IP 250 OP 636: Performed by: INTERNAL MEDICINE

## 2024-12-04 PROCEDURE — 250N000013 HC RX MED GY IP 250 OP 250 PS 637: Performed by: STUDENT IN AN ORGANIZED HEALTH CARE EDUCATION/TRAINING PROGRAM

## 2024-12-04 PROCEDURE — 80048 BASIC METABOLIC PNL TOTAL CA: CPT | Performed by: INTERNAL MEDICINE

## 2024-12-04 PROCEDURE — 0W993ZZ DRAINAGE OF RIGHT PLEURAL CAVITY, PERCUTANEOUS APPROACH: ICD-10-PCS | Performed by: STUDENT IN AN ORGANIZED HEALTH CARE EDUCATION/TRAINING PROGRAM

## 2024-12-04 PROCEDURE — 32555 ASPIRATE PLEURA W/ IMAGING: CPT

## 2024-12-04 PROCEDURE — 250N000013 HC RX MED GY IP 250 OP 250 PS 637: Performed by: INTERNAL MEDICINE

## 2024-12-04 PROCEDURE — 36415 COLL VENOUS BLD VENIPUNCTURE: CPT | Performed by: INTERNAL MEDICINE

## 2024-12-04 PROCEDURE — 85610 PROTHROMBIN TIME: CPT | Performed by: INTERNAL MEDICINE

## 2024-12-04 PROCEDURE — 36415 COLL VENOUS BLD VENIPUNCTURE: CPT | Performed by: STUDENT IN AN ORGANIZED HEALTH CARE EDUCATION/TRAINING PROGRAM

## 2024-12-04 PROCEDURE — 85041 AUTOMATED RBC COUNT: CPT | Performed by: STUDENT IN AN ORGANIZED HEALTH CARE EDUCATION/TRAINING PROGRAM

## 2024-12-04 RX ORDER — LIDOCAINE HYDROCHLORIDE 10 MG/ML
10 INJECTION, SOLUTION EPIDURAL; INFILTRATION; INTRACAUDAL; PERINEURAL ONCE
Status: COMPLETED | OUTPATIENT
Start: 2024-12-04 | End: 2024-12-04

## 2024-12-04 RX ORDER — BUMETANIDE 0.25 MG/ML
2 INJECTION, SOLUTION INTRAMUSCULAR; INTRAVENOUS ONCE
Status: COMPLETED | OUTPATIENT
Start: 2024-12-04 | End: 2024-12-04

## 2024-12-04 RX ORDER — LIDOCAINE 40 MG/G
CREAM TOPICAL
Status: DISCONTINUED | OUTPATIENT
Start: 2024-12-04 | End: 2024-12-13 | Stop reason: HOSPADM

## 2024-12-04 RX ADMIN — AMLODIPINE BESYLATE 10 MG: 10 TABLET ORAL at 08:09

## 2024-12-04 RX ADMIN — LIDOCAINE HYDROCHLORIDE ANHYDROUS 10 ML: 10 INJECTION, SOLUTION INFILTRATION at 14:51

## 2024-12-04 RX ADMIN — ACETAMINOPHEN 650 MG: 325 TABLET, FILM COATED ORAL at 20:46

## 2024-12-04 RX ADMIN — ROSUVASTATIN CALCIUM 10 MG: 10 TABLET, FILM COATED ORAL at 20:43

## 2024-12-04 RX ADMIN — ISOSORBIDE MONONITRATE 120 MG: 60 TABLET, EXTENDED RELEASE ORAL at 08:08

## 2024-12-04 RX ADMIN — NICOTINE 1 PATCH: 21 PATCH, EXTENDED RELEASE TRANSDERMAL at 08:08

## 2024-12-04 RX ADMIN — FLUTICASONE FUROATE AND VILANTEROL TRIFENATATE 1 PUFF: 200; 25 POWDER RESPIRATORY (INHALATION) at 08:08

## 2024-12-04 RX ADMIN — GABAPENTIN 200 MG: 100 CAPSULE ORAL at 20:43

## 2024-12-04 RX ADMIN — LEVALBUTEROL HYDROCHLORIDE 1.25 MG: 1.25 SOLUTION RESPIRATORY (INHALATION) at 11:08

## 2024-12-04 RX ADMIN — CARVEDILOL 12.5 MG: 12.5 TABLET, FILM COATED ORAL at 08:08

## 2024-12-04 RX ADMIN — APIXABAN 2.5 MG: 2.5 TABLET, FILM COATED ORAL at 17:50

## 2024-12-04 RX ADMIN — CLOPIDOGREL BISULFATE 75 MG: 75 TABLET ORAL at 11:08

## 2024-12-04 RX ADMIN — BUMETANIDE 2 MG: 0.25 INJECTION INTRAMUSCULAR; INTRAVENOUS at 09:48

## 2024-12-04 RX ADMIN — CARVEDILOL 12.5 MG: 12.5 TABLET, FILM COATED ORAL at 17:50

## 2024-12-04 RX ADMIN — APIXABAN 2.5 MG: 2.5 TABLET, FILM COATED ORAL at 07:00

## 2024-12-04 ASSESSMENT — ACTIVITIES OF DAILY LIVING (ADL)
ADLS_ACUITY_SCORE: 48
ADLS_ACUITY_SCORE: 48
ADLS_ACUITY_SCORE: 52
ADLS_ACUITY_SCORE: 48
ADLS_ACUITY_SCORE: 48
ADLS_ACUITY_SCORE: 52
ADLS_ACUITY_SCORE: 48
ADLS_ACUITY_SCORE: 52
ADLS_ACUITY_SCORE: 48
ADLS_ACUITY_SCORE: 52
ADLS_ACUITY_SCORE: 48
ADLS_ACUITY_SCORE: 52
ADLS_ACUITY_SCORE: 52
ADLS_ACUITY_SCORE: 48
ADLS_ACUITY_SCORE: 52
ADLS_ACUITY_SCORE: 52
ADLS_ACUITY_SCORE: 48

## 2024-12-04 NOTE — PROGRESS NOTES
Renal Medicine Progress Note            Assessment/Plan:     Shirley Hendricks is a 64 year old female who was admitted on 11/20/2024.      Assessment:  1) chronic kidney disease stage IIIb  Followed by [facility] nephrology, Dr. Barboza and DARREL Roberson, last visit 11/14/2024     Noted history LIMA June/July 2024 requiring dialysis, discontinued 6/24     Hyperkalemia last month, spironolactone discontinued with potassium 6.0.  Potassium normal this admission.    Creatinine worsened in-house after relative hypotensive episodes after escalating blood pressure medications/diuretics/valsartan.  Likely ischemic ATN.  Encouraged to see creatinine plateaued and improving.      2 hypoxic respiratory failure-  -multifactorial with COPD, moderate transudative bilateral pleural effusion with lower lobe consolidation, mild pulmonary edema  normal biventricular filling pressures on RHC.        2 hyponatremia-with acute renal failure.  Improved to 129    Continue p.o. fluid restriction as ordered.       3) hypertension: Blood pressure reasonable.  Could do with slightly higher perfusion pressures.  Continue to hold valsartan..       Discussion-  Given worsening hypoxic respiratory failure, give Bumex 2 mg IV x 1  Thoracentesis might help with lung expansion and oxygenation.  Continue amlodipine, Imdur  Hold discharge today.  Continue with p.o. fluid restriction    Discussed with primary team in person            Interval History:     Seen/examined.    Creatinine improving but her respiratory status and oxygen requirement are getting worse.  Now on 5 L nasal cannula.  Chest x-ray shows moderate bilateral effusion and bilateral lower lobe consolidation.  Mild pulm edema.  Quite anxious.  Reports lower chest pain with deep inspiration ?  Pleurisy.  No fever.  White count okay.              Medications and Allergies:     Current Facility-Administered Medications   Medication Dose Route Frequency Provider Last Rate Last Admin     amLODIPine (NORVASC) tablet 10 mg  10 mg Oral Daily Yoli Huizar MD   10 mg at 12/04/24 0809    apixaban ANTICOAGULANT (ELIQUIS) tablet 2.5 mg  2.5 mg Oral BID Yoli Huizar MD   2.5 mg at 12/04/24 0700    carvedilol (COREG) tablet 12.5 mg  12.5 mg Oral BID w/meals Yoli Huizar MD   12.5 mg at 12/04/24 0808    clopidogrel (PLAVIX) tablet 75 mg  75 mg Oral Daily Marcin White MD   75 mg at 12/04/24 1108    fluticasone (FLONASE) 50 MCG/ACT spray 1 spray  1 spray Both Nostrils Daily Liane Huertas MD   1 spray at 12/03/24 0834    fluticasone-vilanterol (BREO ELLIPTA) 200-25 MCG/ACT inhaler 1 puff  1 puff Inhalation Daily Marcin White MD   1 puff at 12/04/24 0808    gabapentin (NEURONTIN) capsule 200 mg  200 mg Oral At Bedtime Marcin White MD   200 mg at 12/03/24 2056    isosorbide mononitrate (IMDUR) 24 hr tablet 120 mg  120 mg Oral Daily Yoli Huizar MD   120 mg at 12/04/24 0808    nicotine (NICODERM CQ) 21 MG/24HR 24 hr patch 1 patch  1 patch Transdermal Daily Carlos Hunter MD   1 patch at 12/04/24 0808    Followed by    [START ON 1/2/2025] nicotine (NICODERM CQ) 14 MG/24HR 24 hr patch 1 patch  1 patch Transdermal Daily Carlos Hunter MD        Followed by    [START ON 1/30/2025] nicotine (NICODERM CQ) 7 MG/24HR 24 hr patch 1 patch  1 patch Transdermal Daily Carlos Hunter MD        polyethylene glycol (MIRALAX) Packet 17 g  17 g Oral Daily Yoli Huizar MD   17 g at 12/03/24 0835    rosuvastatin (CRESTOR) tablet 10 mg  10 mg Oral At Bedtime Marcin White MD   10 mg at 12/03/24 2057    sodium chloride (PF) 0.9% PF flush 3 mL  3 mL Intracatheter Q8H Marcin White MD   3 mL at 12/04/24 0700    [Held by provider] valsartan (DIOVAN) tablet 40 mg  40 mg Oral Daily Lisa Zeng PA-C   40 mg at 11/28/24 0828        Allergies   Allergen Reactions    Contrast Dye Anaphylaxis     Pt reported facial and throat swelling with  "prior CT contrast    Pantoprazole      Protonix caused diffuse edema    Chantix [Varenicline]      Terrible dreams    Gluten Meal GI Disturbance     Pt has celiac disease. Can not have gluten    Penicillins Itching and Rash            Physical Exam:   Vitals were reviewed  /55 (BP Location: Right arm)   Pulse 51   Temp 97.6  F (36.4  C) (Axillary)   Resp 18   Ht 1.575 m (5' 2\")   Wt 55 kg (121 lb 4.1 oz)   LMP  (LMP Unknown)   SpO2 90%   BMI 22.18 kg/m      Wt Readings from Last 3 Encounters:   12/04/24 55 kg (121 lb 4.1 oz)   09/23/24 44.8 kg (98 lb 12.8 oz)   08/15/24 47.2 kg (104 lb)           GENERAL: Anxious, mild respiratory distress  HEENT:  Normocephalic. No gross abnormalities  CV: RRR, 1/6 systolic murmur, no dependent edema or in left lower extremity  No edema noted  RESP: Diminished bilateral bases  GI: Abdomen soft/nt/nd, BS normal.   MUSCULOSKELETAL: Right leg amputation.  No edema  SKIN: no suspicious lesions or rashes, dry to touch  PSYCH: mood good, affect appropriate           Data:     BMP  Recent Labs   Lab 12/04/24  0653 12/03/24  0655 12/02/24  0643 12/02/24  0049 12/01/24  1707 12/01/24  0509   * 127* 127* 125*   < > 121*   POTASSIUM 4.1 4.1 3.6  --   --  3.9   CHLORIDE 92* 89* 88*  --   --  82*   SONIA 7.8* 7.5* 7.1*  --   --  7.0*   CO2 23 22 22  --   --  21*   .3* 122.7* 117.3*  --   --  122.3*   CR 2.41* 2.67* 2.94*  --   --  3.36*   GLC 78 89 82  --   --  89    < > = values in this interval not displayed.     CBC  Recent Labs   Lab 12/04/24  0653 12/03/24  0655 12/02/24  0643 12/01/24  0509   WBC 5.9 7.5 7.7 8.8   HGB 9.1* 9.3* 9.9* 10.2*   HCT 28.1* 26.7* 30.1* 29.9*   MCV 82 80 80 80   * 145* 185 215     Lab Results   Component Value Date    AST 16 11/21/2024    ALT 15 11/21/2024    ALKPHOS 66 11/21/2024    BILITOTAL 0.2 11/21/2024    BILICONJ 0.0 01/23/2004     Lab Results   Component Value Date    INR 1.31 (H) 04/11/2024       Attestation:  I have " reviewed today's vital signs, notes, medications, labs and imaging.    Nereida Vaz MD  [facility] - Nephrology  Office: [phone]

## 2024-12-04 NOTE — PROGRESS NOTES
[facility]    Medicine Progress Note - Hospitalist Service        Date of Admission:  11/20/2024  2:41 PM    Assessment & Plan:   Shirley Hendricks is a 64 year old female, on Plavix, with a history of COPD, hypertension, CHF, peripheral artery disease, NSTEMI, stage 4 CKD, and tobacco use who is being admitted for evaluation of shortness of breath.      Acute hypoxic respiratory failure-multifactorial.  Acute exacerbation of heart failure with preserved EF.  Moderate to severe left pleural effusion status post thoracentesis.  -Presents with 2-day history of increasing shortness of breath and a cough.  On admission chest x-ray shows a large left pleural effusion with compressive atelectasis.  There is also diffuse interstitial opacities consistent with pulmonary edema.    -Labs pertinent for a proBNP of 673[address]   -Patient underwent bilateral thoracentesis initially on 11/20 and the following day with good improvement in her symptoms.  -Fluid consistent with transudate effusion. Cultures negative. Cytology negative for malignancy  -Completed 5 days of corticosteroid  -TTE done during this hospitalization on 11/22/2024 showed LVEF normal at 55 to 60%.  RV function normal.  Mild 1+ MR, 1+ TR.  Mild pulmonary hypertension.  Compared to prior study there is no significant change  -Completed treatment for community-acquired pneumonia with cefuroxime and azithromycin  -Right heart catheterization done on 11/27/24 showed normal pressures, however this was after several years of diuretics  -Patient's oxygenation initially improved with aggressive diuretics and thoracentesis, however in the last couple of days she has been more hypoxic now requiring 5 L of oxygen as she was on a diuretic holiday due to worsening renal function.  -Repeat chest x-ray performed on 12/3/2024 shows persistent pulmonary edema with ongoing bilateral pleural effusion  -Discussed with pulmonary and nephrology this morning.  Ordered  Bumex 2 mg IV x 1  -Radiology consult for repeat ultrasound-guided bilateral therapeutic thoracentesis  -Pulmonary recommends doing a noncontrast CT after thoracentesis to evaluate pulmonary parenchyma better.     Community Acquired Pnuemonia  COPD with possible acute exacerbation  -Patient initially presented with acute hypoxic respiratory failure, initially felt to be due to acute exacerbation of heart failure with preserved EF  -Initially she was diuresed, also received thoracentesis with improvement in oxygenation however now continues to require oxygen at 2-3 L  -Right heart cath showed normal pressures, cardiology feels that there is a significant component of COPD which is contributing to her symptoms and hypoxia  -Already completed 5 days steroid course and a round of antibiotics  -As mentioned above pulmonary following  -Most likely her hypoxia at this point is multifactorial with definite component of heart failure.  COPD is definitely contributing.  -As mentioned above we will see the response to thoracentesis and new diuretic challenge, it is possible she will need home oxygen at discharge     Hypertensive urgency  New onset A-fib with RVR  Hyperlipidemia  History of coronary artery disease  Type II NSTEMI likely due to demand due to heart failure exacerbation.  -Initially found to be in A-fib, now converted to normal sinus rhythm  -Continue apixaban, currently on 2.5 mg twice a day based on body weight and renal function  -Prior to admission atenolol changed to carvedilol due to refractory hypertension  -Continue amlodipine 10 mg daily  -Continue Plavix which she takes for both history of coronary artery disease and peripheral arterial disease  -Isosorbide mononitrate increased to 120 mg daily  -Continue Crestor     Hyponatremia  -Sodium decreased from 133 on presentation to 121   -Nephrology following  -Did require 2% saline initially, currently improved to 129  -Continue 2 L fluid  restriction  -Nephrology following  -Daily BMP     Acute kidney injury on CKD stage III  Suspected ATN secondary to hypotension and ARB + diuretic  Borderline hyperkalemia  -Creatinine on admission of 1.71, which worsened with diuresis and relative hypotension.  -Creatinine peaked at 3.78 and now downtrending, good urine output  -Creatinine now down to 2.41, as discussed above, challenge with diuretic today, reassess need for ongoing diuretics tomorrow     GERD  -Continue with PTA famotidine     Anemia of Chronic Disease  Baseline hemoglobin has been between 8-9 over the past few months  -Hemoglobin overall stable between 9-10    Peripheral vascular disease with history of 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 2024  -Continue Plavix and statin  -Apixaban added for A-fib     History of thrombocytopenia  -Platelet count normal on admission, slightly at the moment but stable at 146.    Tobacco use  - Continue with nicotine patch  - Counselled on cessation         Diet: Fluid restriction 1200 ML FLUID  Renal Diet (non-dialysis)     DVT Prophylaxis: DOAC   Alvarez Catheter: Not present  Code Status: Full Code     Disposition Plan       Expected Discharge Date: 12/06/2024,  3:00 PM    Destination: home with family        Entered: Vadim Ramirez MD 12/04/2024, 9:58 AM        Medically Ready for Discharge: Anticipated in 2-4 Days       Clinically Significant Risk Factors         # Hyponatremia: Lowest Na = 127 mmol/L in last 2 days, will monitor as appropriate  # Hypochloremia: Lowest Cl = 89 mmol/L in last 2 days, will monitor as appropriate      # Hypoalbuminemia: Lowest albumin = 3 g/dL at 11/21/2024  4:42 AM, will monitor as appropriate     # Hypertension: Noted on problem list     # Acute Hypoxic Respiratory Failure: Documented O2 saturation < 90%. Continue supplemental oxygen as needed          # Moderate Malnutrition: based on nutrition assessment    # Financial/Environmental Concerns: none          The patient's care was discussed  "with the Bedside Nurse, Patient, and pulmonary and nephrology .  And sister Tiffany on the phone    Medical Decision Making       **CLEAR ALL SELECTIONS**      Labs/Imaging Reviewed:  See Information above and Data section below  Time SPENT BY ME on the date of service doing chart review, history, exam, documentation & further activities per the note:  50 MINUTES    Chart documentation was completed, in part, with [employer] voice-recognition software. Even though reviewed, some grammatical, spelling, and word errors may remain.    Vadim Ramirez MD  Hospitalist Service  [facility]  Text Page 7AM-6PM  Securely message with the Vocera Web Console (learn more here)  Text page via [facility] Paging/Directory    ______________________________________________________________________    Interval History   Oxygenation worse this morning, she is now up to 5 L.  Repeat chest x-ray shows pulmonary edema and bilateral pleural effusion.  Plan is to resume diuretic challenge and attempt therapeutic thoracentesis today.    Data reviewed today: I reviewed all medications, new labs and imaging results over the last 24 hours. I personally reviewed no images or EKG's today.    Physical Exam   Vital signs:  Temp: 97.4  F (36.3  C) Temp src: Oral BP: (!) 156/58 Pulse: 56   Resp: 18 SpO2: 90 % O2 Device: Nasal cannula (with humidifier) Oxygen Delivery: 5 LPM Height: 157.5 cm (5' 2\") Weight: 55 kg (121 lb 4.1 oz)  Estimated body mass index is 22.18 kg/m  as calculated from the following:    Height as of this encounter: 1.575 m (5' 2\").    Weight as of this encounter: 55 kg (121 lb 4.1 oz).      Wt Readings from Last 2 Encounters:   12/04/24 55 kg (121 lb 4.1 oz)   09/23/24 44.8 kg (98 lb 12.8 oz)       Gen: AAOX3, NAD, comfortable  HEENT: Supple neck, moist oral mucosa, no pallor  Resp: Decreased air entry at both bases, normal effort of breathing  CVS: RRR, no murmur  Abd/GI: Soft, non-tender. BS- normoactive.  No " G/R/R  Skin: Warm, dry no rashes  MSK: Trace pedal edema  Neuro- CN- intact. No focal deficits.       Data   Recent Labs   Lab 12/04/24  0653 12/03/24  0655 12/02/24  0643   WBC 5.9 7.5 7.7   HGB 9.1* 9.3* 9.9*   MCV 82 80 80   * 145* 185   * 127* 127*   POTASSIUM 4.1 4.1 3.6   CHLORIDE 92* 89* 88*   CO2 23 22 22   .3* 122.7* 117.3*   CR 2.41* 2.67* 2.94*   ANIONGAP 14 16* 17*   SONIA 7.8* 7.5* 7.1*   GLC 78 89 82       Recent Results (from the past 24 hours)   XR Chest 2 Views    Narrative    EXAM: XR CHEST 2 VIEWS  LOCATION: [facility]  DATE: 12/3/2024    INDICATION: persistent hypoxia after thoracentesis  COMPARISON: 11/23/2024      Impression    IMPRESSION: Mild pulmonary edema, slightly increased compared to prior. Moderate bilateral pleural effusions and bilateral lower lobe consolidation, not significantly changed. No pneumothorax. Stable cardiomediastinal silhouette.     Medications   Current Facility-Administered Medications   Medication Dose Route Frequency Provider Last Rate Last Admin     Current Facility-Administered Medications   Medication Dose Route Frequency Provider Last Rate Last Admin    amLODIPine (NORVASC) tablet 10 mg  10 mg Oral Daily Yoli Huizar MD   10 mg at 12/04/24 0809    apixaban ANTICOAGULANT (ELIQUIS) tablet 2.5 mg  2.5 mg Oral BID Yoli Huizar MD   2.5 mg at 12/04/24 0700    carvedilol (COREG) tablet 12.5 mg  12.5 mg Oral BID w/meals Yoli Huizar MD   12.5 mg at 12/04/24 0808    clopidogrel (PLAVIX) tablet 75 mg  75 mg Oral Daily Marcin White MD   75 mg at 12/03/24 1239    fluticasone (FLONASE) 50 MCG/ACT spray 1 spray  1 spray Both Nostrils Daily Liane Huertas MD   1 spray at 12/03/24 0834    fluticasone-vilanterol (BREO ELLIPTA) 200-25 MCG/ACT inhaler 1 puff  1 puff Inhalation Daily Marcin White MD   1 puff at 12/04/24 0808    gabapentin (NEURONTIN) capsule 200 mg  200 mg Oral At  Bedtime Marcin White MD   200 mg at 12/03/24 2056    isosorbide mononitrate (IMDUR) 24 hr tablet 120 mg  120 mg Oral Daily Yoli Huizar MD   120 mg at 12/04/24 0808    nicotine (NICODERM CQ) 21 MG/24HR 24 hr patch 1 patch  1 patch Transdermal Daily Carlos Hunter MD   1 patch at 12/04/24 0808    Followed by    [START ON 1/2/2025] nicotine (NICODERM CQ) 14 MG/24HR 24 hr patch 1 patch  1 patch Transdermal Daily Carlos Hunter MD        Followed by    [START ON 1/30/2025] nicotine (NICODERM CQ) 7 MG/24HR 24 hr patch 1 patch  1 patch Transdermal Daily Carlos Hunter MD        polyethylene glycol (MIRALAX) Packet 17 g  17 g Oral Daily Yoli Huizar MD   17 g at 12/03/24 0835    rosuvastatin (CRESTOR) tablet 10 mg  10 mg Oral At Bedtime Marcin White MD   10 mg at 12/03/24 2057    sodium chloride (PF) 0.9% PF flush 3 mL  3 mL Intracatheter Q8H Marcin White MD   3 mL at 12/04/24 0700    [Held by provider] valsartan (DIOVAN) tablet 40 mg  40 mg Oral Daily Lisa Zeng PA-C   40 mg at 11/28/24 0828

## 2024-12-04 NOTE — PROGRESS NOTES
Care Management Follow Up    Length of Stay (days): 14    Expected Discharge Date: 12/06/2024     Concerns to be Addressed: discharge planning     Patient plan of care discussed at interdisciplinary rounds: Yes    Anticipated Discharge Disposition: Home              Anticipated Discharge Services:    Anticipated Discharge DME:      Patient/family educated on Medicare website which has current facility and service quality ratings:    Education Provided on the Discharge Plan: Yes  Patient/Family in Agreement with the Plan: yes    Referrals Placed by CM/SW:    Private pay costs discussed: Not applicable    Discussed  Partnership in Safe Discharge Planning  document with patient/family: No     Handoff Completed: No, handoff not indicated or clinically appropriate    Additional Information:  Patient continuing to have worsening respiratory failure. Contemplating thoracentesis as an option. Discharge held for today. Continue 1200 ml fluid restriction. Nephrology and Pulmonology following. Discharge disposition unclear at this time. Continue to follow POC.     Next Steps: medical stability      Levy Morrison RN  [facility]  Inpatient Care Management - FLOAT  CM RN Mobile: [phone] daily 7:30-4:00

## 2024-12-04 NOTE — PROGRESS NOTES
[facility]   Pulmonary Progress Note  Shirley Hendricks MRN: [id number]  1960  Date of Admission:11/20/2024  Date of Service: 12/04/2024  ___________________________________  Assessment & Plan  Shirley Hendricks is a 64 year old female with medical history significant for COPD, hypertension, CHF, peripheral arterial disease (on Plavix), CKD stage IV, CAD who was admitted on 11/20/2024 with 2 days of worsening shortness of breath and cough.  Despite diuresis and bilateral thoracentesis (transudative fluid) she remains hypoxic. CT chest performed 11/21 shows moderate right pleural effusion with adjacent atelectasis as well as new patchy consolidative opacities within the right middle and lower lobes, moderate left sided pleural effusion, interstitial pulmonary edema and prominent lymph nodes.  Trace pericardial effusion was also noted.        Acute hypoxic respiratory failure    COPD exacerbation    HFpEF    Pleural effusions transudative  11/20 (right) and 11/21 (left) thoras done for 1.1 and 1.2L respectively.   11/22 (right) thora for 1.5L all transudative  CT 11/21 showed right-sided pleural effusion with atelectasis and consolidative opacities in the right middle lobes and moderate left-sided effusion.  Treated for CAP appropriately (azithromycin, ceftriaxone, cefuroxime) without evidence of ongoing leukocytosis or fever. (Ended 11/26)  RHC normal pressures (11/27) when patent was 97lbs  (was on room air)  Diuretics held since 11/27 to 12/4, patient up 20lbs, Cr improved during this time    12/4: Now requiring 5 L nasal cannula, chest x-ray showing bilateral pulmonary edema in the bases and likely recurrence of the effusions.  Would favor restarting diuretics given the patient is 20 pounds up.  Understandably this is not ideal because kidney function is just now recovering, however its likely the cause of her worsening hypoxemia.  Likely very fragile fluid status in general. Would start  with diuresis for now, ok with redoing thora, would avoid doing both in the same day.  We do not have CT imaging after expansion of the lungs.  A concern would be if there is obstruction or trapped lung as leaving behind a potential space for quickly fills when patient gets fluid overloaded. Once both sides are tapped, we should get CT chest non-contrast immediately after   -Would favor restarting diuretics   -if clinically worsens would perform thoracentesis   -If thoracenteses are completed would recommend getting CT chest immediately after to evaluate atelectatic lung   -add back DuoNebs   -Recheck BNP (ordered)      I have personally reviewed the daily labs, imaging studies, cultures and discussed the case with referring physician and consulting physicians.     I spent 55 dedicated to her care so far today 12/04/2024 excluding procedures, including review of medical records, review of imaging (results & images), time with patient and time in documentation.    Ricardo Wheeler MD  Pulmonary & Critical Care   [facility], [id number]  Securely message with the Vocera Web Console (learn more here)    Interval History    - Nursing notes reviewed.   - frustrated that her lungs are worse again   - breathing is worse when she changes positions   - denies cough or fever  - feels similar to when fluid was building up outside her lungs   Review of Systems   ROS:  6 point ROS including Respiratory, CV, GI and , other than that noted in the HPI, is negative     Physical Exam Temp:  [97.3  F (36.3  C)-98  F (36.7  C)] 97.9  F (36.6  C)  Pulse:  [50-58] 50  Resp:  [17-18] 18  BP: (144-163)/(58-65) 148/63  SpO2:  [86 %-94 %] 91 %  I/O last 3 completed shifts:  In: 210 [P.O.:210]  Out: -   Wt Readings from Last 1 Encounters:   12/03/24 54.8 kg (120 lb 13 oz)    Body mass index is 22.1 kg/m . Resp: 18    Exam:  General: thin female in no acute distress, on HFNC   HENT: external ears without visible  abnormalities, no rhinorrhea, no epistaxis  Lungs: fair air flow b/l, no wheezes, rales, rhonchi, diminished in bases b/l  Heart: RRR, holosystolic murmu  Extremities: no pitting edema, R BKA, no clubbing or cyanosis  Skin: no visible rashes, no mottling  Neurologic: moving all 4 extremities spontaneously, awake and alert    Data   Labs: reviewed in EMR and notable labs listed below.        Imaging: reviewed in EMR and notable imaging listed below.  CXR (12/3/24) moderate effusions, pulmonary edema     CT Chest 11/21  1.  Small to moderate right pleural effusion with adjacent  atelectasis. New patchy consolidative opacity within the right middle  lobe and lower lobe concerning for superimposed pneumonia with  possible component of reexpansion edema.  2.  Moderate left pleural effusion with complete atelectasis of the  left lower lobe.  3.  Mild interstitial pulmonary edema.    PFT 2014  Mild reduction in FEV and FVC, diffusion defect (mild), air trapping present    Medications   Current Facility-Administered Medications   Medication Dose Route Frequency Provider Last Rate Last Admin    amLODIPine (NORVASC) tablet 10 mg  10 mg Oral Daily Yoli Huizar MD   10 mg at 12/03/24 0834    apixaban ANTICOAGULANT (ELIQUIS) tablet 2.5 mg  2.5 mg Oral BID Yoli Huizar MD   2.5 mg at 12/04/24 0700    carvedilol (COREG) tablet 12.5 mg  12.5 mg Oral BID w/meals Yoli Huizar MD   12.5 mg at 12/03/24 1712    clopidogrel (PLAVIX) tablet 75 mg  75 mg Oral Daily Marcin White MD   75 mg at 12/03/24 1239    fluticasone (FLONASE) 50 MCG/ACT spray 1 spray  1 spray Both Nostrils Daily Liane Huertas MD   1 spray at 12/03/24 0834    fluticasone-vilanterol (BREO ELLIPTA) 200-25 MCG/ACT inhaler 1 puff  1 puff Inhalation Daily Marcin White MD   1 puff at 12/03/24 0834    gabapentin (NEURONTIN) capsule 200 mg  200 mg Oral At Bedtime Marcin White MD   200 mg at 12/03/24 2056    isosorbide  mononitrate (IMDUR) 24 hr tablet 120 mg  120 mg Oral Daily Yoli Huizar MD   120 mg at 12/03/24 0834    nicotine (NICODERM CQ) 21 MG/24HR 24 hr patch 1 patch  1 patch Transdermal Daily Carlos Hunter MD   1 patch at 12/03/24 0835    Followed by    [START ON 1/2/2025] nicotine (NICODERM CQ) 14 MG/24HR 24 hr patch 1 patch  1 patch Transdermal Daily Carlos Hunter MD        Followed by    [START ON 1/30/2025] nicotine (NICODERM CQ) 7 MG/24HR 24 hr patch 1 patch  1 patch Transdermal Daily Carlos Hunter MD        polyethylene glycol (MIRALAX) Packet 17 g  17 g Oral Daily Yoli Huizar MD   17 g at 12/03/24 0835    rosuvastatin (CRESTOR) tablet 10 mg  10 mg Oral At Bedtime Marcin White MD   10 mg at 12/03/24 2057    sodium chloride (PF) 0.9% PF flush 3 mL  3 mL Intracatheter Q8H Marcin White MD   3 mL at 12/04/24 0700    [Held by provider] valsartan (DIOVAN) tablet 40 mg  40 mg Oral Daily Lisa Zeng PA-C   40 mg at 11/28/24 0828

## 2024-12-04 NOTE — PLAN OF CARE
Goal Outcome Evaluation:      Plan of Care Reviewed With: patient    SUMMARY: Admitted for evaluation of SOB.   DATE & TIME:12/3/24 2052-8183  Cognitive Concerns/ Orientation : A&O x 4, anxious at times, otherwise calm and cooperative    BEHAVIOR & AGGRESSION TOOL COLOR: Green   CIWA SCORE: NA    ABNL VS/O2: /65, 144/58, christel with HR in 50s, other VSS, O2 desat to mid 80% on 2L, c/o SOB, needed 4L to keep >90%, encouraged IS and flutter valve, currently on 3L sating 92%.   MOBILITY: R.AKA due to vascular disease per patient-wheelchair bound at baseline. Able to T&R independently and can transfer from bed to wheelchair with minimal assistance.   PAIN MANAGMENT: denies pain   DIET: Renal, 1200 mL FR. Strict I/Os.   BOWEL/BLADDER: intermittent incontinence  ABNL LAB/BG: Cr 2.67, GFR 19, Hgb 9.3 Plt 145, Na 127  DRAIN/DEVICES: PIV SL  TELEMETRY RHYTHM: NA   SKIN: dusky, blanchable redness to coccyx. Old scar to R. Upper thigh from previous surgeries   TESTS/PROCEDURES: s/p CXR, mild pulmonary edema, slightly increased compared to prior. Mod bilat pleural effusion and BLL consolidation, not significantly changed.   D/C DATE: TBD  Discharge Barriers: pending sodiun, creatinine and O2 need improvement.   OTHER IMPORTANT INFO: nephrology , and PT consulted. Nicotine patch on Rt arm.

## 2024-12-04 NOTE — PROGRESS NOTES
Date/Time 12/3/24 1409-5156    Diagnosis: SOB, HX of Right AKA  Mental Status:A&O x4.   Activity/dangle Wheelchair Bound, Up independent/SBA to wheelchair  Diet: Renal diet, 1200 fluid restriction.   Pain: PRN Tylenol, Schedule Gabapentin   Alvarez/Voiding: Pure wick, Intermittent incontinent with B&B  Tele/Restraints/Iso: NA  02/LDA: On 3L NC  D/C Date: TBD  Other Info: Nicotine patch on right arm

## 2024-12-04 NOTE — PLAN OF CARE
SUMMARY: Admitted for evaluation of SOB.   DATE & TIME:12/3/24-12/4/24: 1930-0730  Cognitive Concerns/ Orientation : A&O x 4, anxious at times, otherwise calm and cooperative    BEHAVIOR & AGGRESSION TOOL COLOR: Green   CIWA SCORE: NA    ABNL VS/O2: /63, christel with HR in 50s, denies SOB, O2 desat to mid 80% on 3L, needed up to 4L to keep >90%  MOBILITY: R.AKA due to vascular disease per patient-wheelchair bound at baseline. Able to T&R independently and can transfer from bed to wheelchair with minimal assistance.   PAIN MANAGMENT: denies pain   DIET: Renal, 1200 mL FR. Strict I/Os.   BOWEL/BLADDER: intermittent incontinence, purewick used overnight per pt request  ABNL LAB/BG:   DRAIN/DEVICES: PIV SL  TELEMETRY RHYTHM: NA   SKIN: dusky, blanchable redness to coccyx. Old scar to R. Upper thigh from previous surgeries   TESTS/PROCEDURES: s/p CXR, mild pulmonary edema, slightly increased compared to prior. Mod bilat pleural effusion and BLL consolidation, not significantly changed.   D/C DATE: TBD  Discharge Barriers: pending sodium, creatinine and O2 need improvement.   OTHER IMPORTANT INFO: nephrology , and PT consulted. Nicotine patch on Rt arm

## 2024-12-04 NOTE — PROGRESS NOTES
CLINICAL NUTRITION SERVICES - REASSESSMENT NOTE    Recommendations Ordered by Registered Dietitian (RD):   Continue diet order  Encouraged consistent po intakes w/ protein to support nutritional needs   Malnutrition: 11/27  % Weight Loss:  Weight loss does not meet criteria for malnutrition - pt w/ AKA during time of wt loss  % Intake:  No decreased intake noted  Subcutaneous Fat Loss:  Global mild losses   Muscle Loss:  Global moderate  Fluid Retention:  Trace     Malnutrition Diagnosis: Moderate malnutrition  In Context of:  Chronic illness or disease     EVALUATION OF PROGRESS TOWARD GOALS   Diet:  Renal Diet (non-dialysis) + 1200 mL fluid restriction    Intake/Tolerance:    - Documented: 12/3: 100, 12/2: 100/100/50/100, 12/1: 100, 11/29: 100, 11/28: 100/100/100  - Ordering small-adequately nourishing meals 2-3x/day per healthtouch    ASSESSED NUTRITION NEEDS:  Dosing Weight: 47.5 kg   Estimated Energy Needs: 1504-1886+ kcals (25-30+ Kcal/Kg)  Justification: maintenance + COPD  Estimated Protein Needs: 48-62 grams (1-1.3 g pro/Kg)  Justification:  stage 4 CKD while hospitalized  Estimated Fluid Needs: 1800 ml restriction  Justification: per provider pending fluid status    NEW FINDINGS:   Met with patient today. Patient reports a good appetite. She is frustrated with the gluten free menu limitations. Writer provided full gluten free menu handout. She notes family is occasionally bringing in food for her ([employer]) in addition to ordering meal trays. Requested an apple w/ peanut butter during visit and writer helped to order this for her.     Labs: Na 129 (L) .3 (H) Cr 2.41 (H) GFR 22 (L) Ca 7.8 (L)  Meds: Miralax  Skin: Reviewed  GI: Last BM 11/30    Weight trending: up, diuresis held per MD    12/04/24 0700 55 kg (121 lb 4.1 oz) Bed scale   12/03/24 0628 54.8 kg (120 lb 13 oz) Bed scale   12/02/24 0547 54 kg (119 lb 0.8 oz) Bed scale   12/01/24 0637 49.1 kg (108 lb 3.9 oz) Bed scale   11/29/24 0543 50.8  kg (111 lb 15.9 oz) Bed scale   11/28/24 0626 43.6 kg (96 lb 1.9 oz) Bed scale   11/27/24 0700 47.5 kg (104 lb 11.5 oz) Bed scale   11/26/24 0325 45.2 kg (99 lb 10.4 oz) Bed scale   11/25/24 0607 43.2 kg (95 lb 3.8 oz) Bed scale   11/24/24 0546 45.2 kg (99 lb 10.4 oz) Bed scale   11/23/24 0535 44.5 kg (98 lb 1.7 oz) Bed scale   11/22/24 0415 45.3 kg (99 lb 12.8 oz) Bed scale   11/20/24 1453 45.4 kg (100 lb) --     Previous Goals:   PO --> 75% meal trays TID  Evaluation: Met    Previous Nutrition Diagnosis:   Inadequate oral intake related to multiple chronic co-morbidities and diet interruptions as evidenced by mild fat and moderate muscle losses  Evaluation: Improving    CURRENT NUTRITION DIAGNOSIS  Inadequate oral intake related to multiple chronic co-morbidities and diet interruptions as evidenced by mild fat and moderate muscle losses    INTERVENTIONS  Recommendations / Nutrition Prescription  Continue diet order  Encouraged consistent po intakes w/ protein to support nutritional needs    Implementation  General/healthful diet    Goals  PO intakes >/= 75% adequately nourishing meals TID     MONITORING AND EVALUATION:  Progress towards goals will be monitored and evaluated per protocol and Practice Guidelines    Marleen Vallejo RD, LD

## 2024-12-05 ENCOUNTER — APPOINTMENT (OUTPATIENT)
Dept: CT IMAGING | Facility: CLINIC | Age: 64
DRG: 280 | End: 2024-12-05
Attending: HOSPITALIST
Payer: COMMERCIAL

## 2024-12-05 ENCOUNTER — APPOINTMENT (OUTPATIENT)
Dept: ULTRASOUND IMAGING | Facility: CLINIC | Age: 64
DRG: 280 | End: 2024-12-05
Attending: HOSPITALIST
Payer: COMMERCIAL

## 2024-12-05 VITALS
RESPIRATION RATE: 18 BRPM | DIASTOLIC BLOOD PRESSURE: 60 MMHG | OXYGEN SATURATION: 94 % | TEMPERATURE: 98.4 F | SYSTOLIC BLOOD PRESSURE: 142 MMHG | WEIGHT: 122.36 LBS | HEART RATE: 55 BPM | BODY MASS INDEX: 22.52 KG/M2 | HEIGHT: 62 IN

## 2024-12-05 LAB
ANION GAP SERPL CALCULATED.3IONS-SCNC: 13 MMOL/L (ref 7–15)
BUN SERPL-MCNC: 110.8 MG/DL (ref 8–23)
CALCIUM SERPL-MCNC: 7.6 MG/DL (ref 8.8–10.4)
CHLORIDE SERPL-SCNC: 91 MMOL/L (ref 98–107)
CREAT SERPL-MCNC: 2.08 MG/DL (ref 0.51–0.95)
EGFRCR SERPLBLD CKD-EPI 2021: 26 ML/MIN/1.73M2
ERYTHROCYTE [DISTWIDTH] IN BLOOD BY AUTOMATED COUNT: 14.6 % (ref 10–15)
GLUCOSE SERPL-MCNC: 83 MG/DL (ref 70–99)
HCO3 SERPL-SCNC: 23 MMOL/L (ref 22–29)
HCT VFR BLD AUTO: 27.3 % (ref 35–47)
HGB BLD-MCNC: 8.9 G/DL (ref 11.7–15.7)
MCH RBC QN AUTO: 26.6 PG (ref 26.5–33)
MCHC RBC AUTO-ENTMCNC: 32.6 G/DL (ref 31.5–36.5)
MCV RBC AUTO: 82 FL (ref 78–100)
PLATELET # BLD AUTO: 138 10E3/UL (ref 150–450)
POTASSIUM SERPL-SCNC: 3.9 MMOL/L (ref 3.4–5.3)
RBC # BLD AUTO: 3.34 10E6/UL (ref 3.8–5.2)
SODIUM SERPL-SCNC: 127 MMOL/L (ref 135–145)
WBC # BLD AUTO: 5.9 10E3/UL (ref 4–11)

## 2024-12-05 PROCEDURE — 85018 HEMOGLOBIN: CPT | Performed by: STUDENT IN AN ORGANIZED HEALTH CARE EDUCATION/TRAINING PROGRAM

## 2024-12-05 PROCEDURE — 250N000013 HC RX MED GY IP 250 OP 250 PS 637: Performed by: STUDENT IN AN ORGANIZED HEALTH CARE EDUCATION/TRAINING PROGRAM

## 2024-12-05 PROCEDURE — 99233 SBSQ HOSP IP/OBS HIGH 50: CPT | Performed by: STUDENT IN AN ORGANIZED HEALTH CARE EDUCATION/TRAINING PROGRAM

## 2024-12-05 PROCEDURE — 32555 ASPIRATE PLEURA W/ IMAGING: CPT

## 2024-12-05 PROCEDURE — 250N000013 HC RX MED GY IP 250 OP 250 PS 637: Performed by: INTERNAL MEDICINE

## 2024-12-05 PROCEDURE — 99232 SBSQ HOSP IP/OBS MODERATE 35: CPT | Performed by: INTERNAL MEDICINE

## 2024-12-05 PROCEDURE — 99233 SBSQ HOSP IP/OBS HIGH 50: CPT | Performed by: HOSPITALIST

## 2024-12-05 PROCEDURE — 272N000706 US THORACENTESIS

## 2024-12-05 PROCEDURE — 36415 COLL VENOUS BLD VENIPUNCTURE: CPT | Performed by: STUDENT IN AN ORGANIZED HEALTH CARE EDUCATION/TRAINING PROGRAM

## 2024-12-05 PROCEDURE — 82435 ASSAY OF BLOOD CHLORIDE: CPT | Performed by: INTERNAL MEDICINE

## 2024-12-05 PROCEDURE — 250N000009 HC RX 250: Performed by: RADIOLOGY

## 2024-12-05 PROCEDURE — 80048 BASIC METABOLIC PNL TOTAL CA: CPT | Performed by: INTERNAL MEDICINE

## 2024-12-05 PROCEDURE — 71250 CT THORAX DX C-: CPT

## 2024-12-05 PROCEDURE — 0W9B3ZZ DRAINAGE OF LEFT PLEURAL CAVITY, PERCUTANEOUS APPROACH: ICD-10-PCS | Performed by: RADIOLOGY

## 2024-12-05 PROCEDURE — 85041 AUTOMATED RBC COUNT: CPT | Performed by: STUDENT IN AN ORGANIZED HEALTH CARE EDUCATION/TRAINING PROGRAM

## 2024-12-05 PROCEDURE — 94660 CPAP INITIATION&MGMT: CPT

## 2024-12-05 PROCEDURE — 120N000001 HC R&B MED SURG/OB

## 2024-12-05 PROCEDURE — 999N000157 HC STATISTIC RCP TIME EA 10 MIN

## 2024-12-05 RX ORDER — LIDOCAINE HYDROCHLORIDE 10 MG/ML
10 INJECTION, SOLUTION EPIDURAL; INFILTRATION; INTRACAUDAL; PERINEURAL ONCE
Status: COMPLETED | OUTPATIENT
Start: 2024-12-05 | End: 2024-12-05

## 2024-12-05 RX ORDER — CARBOXYMETHYLCELLULOSE SODIUM 5 MG/ML
1 SOLUTION/ DROPS OPHTHALMIC
Status: DISCONTINUED | OUTPATIENT
Start: 2024-12-05 | End: 2024-12-13 | Stop reason: HOSPADM

## 2024-12-05 RX ORDER — TORSEMIDE 20 MG/1
20 TABLET ORAL DAILY
Status: DISCONTINUED | OUTPATIENT
Start: 2024-12-05 | End: 2024-12-06

## 2024-12-05 RX ADMIN — LIDOCAINE HYDROCHLORIDE 10 ML: 10 INJECTION, SOLUTION EPIDURAL; INFILTRATION; INTRACAUDAL; PERINEURAL at 11:53

## 2024-12-05 RX ADMIN — AMLODIPINE BESYLATE 10 MG: 10 TABLET ORAL at 08:37

## 2024-12-05 RX ADMIN — ROSUVASTATIN CALCIUM 10 MG: 10 TABLET, FILM COATED ORAL at 20:17

## 2024-12-05 RX ADMIN — GABAPENTIN 200 MG: 100 CAPSULE ORAL at 20:17

## 2024-12-05 RX ADMIN — FLUTICASONE FUROATE AND VILANTEROL TRIFENATATE 1 PUFF: 200; 25 POWDER RESPIRATORY (INHALATION) at 08:31

## 2024-12-05 RX ADMIN — ACETAMINOPHEN 1000 MG: 500 TABLET, FILM COATED ORAL at 20:21

## 2024-12-05 RX ADMIN — APIXABAN 2.5 MG: 2.5 TABLET, FILM COATED ORAL at 05:46

## 2024-12-05 RX ADMIN — FLUTICASONE PROPIONATE 1 SPRAY: 50 SPRAY, METERED NASAL at 08:31

## 2024-12-05 RX ADMIN — ISOSORBIDE MONONITRATE 120 MG: 60 TABLET, EXTENDED RELEASE ORAL at 08:36

## 2024-12-05 RX ADMIN — TORSEMIDE 20 MG: 20 TABLET ORAL at 12:57

## 2024-12-05 RX ADMIN — CLOPIDOGREL BISULFATE 75 MG: 75 TABLET ORAL at 12:57

## 2024-12-05 RX ADMIN — NICOTINE 1 PATCH: 21 PATCH, EXTENDED RELEASE TRANSDERMAL at 08:34

## 2024-12-05 RX ADMIN — APIXABAN 2.5 MG: 2.5 TABLET, FILM COATED ORAL at 17:18

## 2024-12-05 RX ADMIN — CARVEDILOL 12.5 MG: 12.5 TABLET, FILM COATED ORAL at 08:36

## 2024-12-05 ASSESSMENT — ACTIVITIES OF DAILY LIVING (ADL)
ADLS_ACUITY_SCORE: 54
ADLS_ACUITY_SCORE: 58
ADLS_ACUITY_SCORE: 61
ADLS_ACUITY_SCORE: 54
ADLS_ACUITY_SCORE: 61
ADLS_ACUITY_SCORE: 61
ADLS_ACUITY_SCORE: 54
ADLS_ACUITY_SCORE: 59
ADLS_ACUITY_SCORE: 54
ADLS_ACUITY_SCORE: 61
ADLS_ACUITY_SCORE: 61
ADLS_ACUITY_SCORE: 54
ADLS_ACUITY_SCORE: 61
ADLS_ACUITY_SCORE: 54
ADLS_ACUITY_SCORE: 61
ADLS_ACUITY_SCORE: 58
ADLS_ACUITY_SCORE: 54
ADLS_ACUITY_SCORE: 54
ADLS_ACUITY_SCORE: 61
ADLS_ACUITY_SCORE: 58

## 2024-12-05 NOTE — PLAN OF CARE
Goal Outcome Evaluation:      Plan of Care Reviewed With: patient    SUMMARY: Admitted for evaluation of SOB.   DATE & TIME:12/4/24 4422-2376  Cognitive Concerns/ Orientation : A&O x 4, anxious at times, otherwise calm and cooperative    BEHAVIOR & AGGRESSION TOOL COLOR: Green   CIWA SCORE: NA    ABNL VS/O2: /67, 156/58, 136/55, christel with HR in 50s, other VSS, O2 need increased to 6-7L oxymask to keep >90%, encouraged IS and flutter valve. MD aware, ordered US thoracentesis.   MOBILITY: R.AKA due to vascular disease per patient-wheelchair bound at baseline. Able to T&R independently and can transfer from bed to wheelchair with minimal assistance.   PAIN MANAGMENT: denies pain   DIET: Renal, 1200 mL FR. Strict I/Os.   BOWEL/BLADDER: intermittent incontinence  ABNL LAB/BG: Hgb 9.1, hematocrit 28.1, Plt 146, INR 1.24, Cr 2.41, Na 139  DRAIN/DEVICES: PIV SL  TELEMETRY RHYTHM: NA   SKIN: dusky, blanchable redness to coccyx. Old scar to R. Upper thigh from previous surgeries   TESTS/PROCEDURES: s/p Right sided US thoracentesis, Possible Left US thoracentesis tomorrow followed with CT scan.   D/C DATE: TBD  Discharge Barriers, creatinine and O2 need improvement.   OTHER IMPORTANT INFO: Received 1 time dose of IV Bumex 2 mg, with minimal UOP, s/p Rt sided thoracentesis, removed 1450 mL fluid, no changed in O2 need, however pt reported feeling better after the procedure, dressing CDI. Nicotine patch on Lt arm. Nephrology and Pulmonology following.

## 2024-12-05 NOTE — PLAN OF CARE
Date/Time: 12/4/25 1900 - 2300  Mental Status: A&O x4  Activity/dangle: Ast of 1 pivot to wheelchair  Diet: Renal diet w/ 1200 fluid restriction  Pain: Managed with PRN Tylenol  Alvarez/Voiding: Incontinent, purwick in place  Tele/Restraints/Iso: N/A  02/LDA: 7L O2 Oxymask  D/C Date:TBD  Other Info: Nicotine patch on left arm. Possible Left US thoracentesis tomorrow followed with CT scan.

## 2024-12-05 NOTE — PLAN OF CARE
SUMMARY: Admitted for evaluation of SOB.   DATE & TIME:12/4/24-12/5/24 1350-1984  Cognitive Concerns/ Orientation : A&O x 4, anxious at times, otherwise calm and cooperative    BEHAVIOR & AGGRESSION TOOL COLOR: Green   CIWA SCORE: NA    ABNL VS/O2: HR in 50s, other VSS on 5 L oxymask to keep >90%. L thoracentesis today.   MOBILITY: R.AKA due to vascular disease per patient-wheelchair bound at baseline. Able to T&R independently and can transfer from bed to wheelchair with minimal assistance.   PAIN MANAGMENT: denies pain   DIET: Renal, 1200 mL FR. Strict I/Os.   BOWEL/BLADDER: intermittent incontinence, purewick inplace  ABNL LAB/BG: Hgb 9.1, hematocrit 28.1, Plt 146, INR 1.24, Cr 2.41, Na 139  DRAIN/DEVICES: PIV SL  TELEMETRY RHYTHM: NA   SKIN: dusky, blanchable redness to coccyx. Old scar to R. Upper thigh from previous surgeries   TESTS/PROCEDURES: s/p Right sided US thoracentesis, Possible Left US thoracentesis today followed with CT scan.   D/C DATE: TBD  Discharge Barriers, creatinine and O2 need improvement.   OTHER IMPORTANT INFO: Rt sided thoracentesis, removed 1450 mL 12/4. Pt reported feeling better after the procedure, dressing CDI. Nicotine patch on L arm. Nephrology and Pulmonology following.

## 2024-12-05 NOTE — PROGRESS NOTES
[facility]   Pulmonary Progress Note  Shirley Hendricks MRN: [id number]  1960  Date of Admission:11/20/2024  Date of Service: 12/05/2024  ___________________________________  Assessment & Plan  Shirley Hendricks is a 64 year old female with medical history significant for COPD, hypertension, CHF, peripheral arterial disease (on Plavix), CKD stage IV, CAD who was admitted on 11/20/2024 with 2 days of worsening shortness of breath and cough.  Despite diuresis and bilateral thoracentesis (transudative fluid) she remains hypoxic. CT chest performed 11/21 shows moderate right pleural effusion with adjacent atelectasis as well as new patchy consolidative opacities within the right middle and lower lobes, moderate left sided pleural effusion, interstitial pulmonary edema and prominent lymph nodes.  Trace pericardial effusion was also noted.      Acute hypoxic respiratory failure    COPD exacerbation    HFpEF    Pleural effusions transudative  11/20 (right) thora 1.1L  11/21 (left)   thora 1.2L   11/22 (right) thora 1.5L all transudative  CT 11/21 showed right-sided pleural effusion with atelectasis and consolidative opacities in the right middle lobes and moderate left-sided effusion.  Treated for CAP appropriately (azithromycin, ceftriaxone, cefuroxime) without evidence of ongoing leukocytosis or fever. (Ended 11/26)  RHC normal pressures (11/27) when patent was 97lbs  (was on room air)  Diuretics held since 11/27 to 12/4, patient up 20lbs, Cr improved during this time  12/04 (right) thora 1.4L  12/05 (left)   thora pending    12/05/2024  Bumex 2 mg given 12/4 with minimal response.  Thoracentesis with 1.4 L taken off the right side.  Still requiring 5 L oxygen.  Planning for thoracentesis on the left and CT chest after to better evaluate underlying atelectatic lung. Would also trial of BiPAP for 1 hour to see if it helps open up lung bases, patient does not need to continuously be on BiPAP...  would try doing 1hr twice a day... essentially for recruitment (if tolerated)   -likely still fluid up, diuresis pending kidney function   -Left Thora, followed by CT scan   -Bertha   -try BiPAP 1 hour BID (try to recruit lung bases) (ordered)      Initial impression  12/4: Now requiring 5 L nasal cannula, chest x-ray showing bilateral pulmonary edema in the bases and likely recurrence of the effusions.  Would favor restarting diuretics given the patient is 20 pounds up.  Understandably this is not ideal because kidney function is just now recovering, however its likely the cause of her worsening hypoxemia.  Likely very fragile fluid status in general. Would start with diuresis for now, ok with redoing thora, would avoid doing both in the same day.  We do not have CT imaging after expansion of the lungs.  A concern would be if there is obstruction or trapped lung as leaving behind a potential space for quickly fills when patient gets fluid overloaded. Once both sides are tapped, we should get CT chest non-contrast immediately after      I have personally reviewed the daily labs, imaging studies, cultures and discussed the case with referring physician and consulting physicians.     I spent 55 dedicated to her care so far today 12/05/2024 excluding procedures, including review of medical records, review of imaging (results & images), time with patient and time in documentation.    Ricardo Wheeler MD  Pulmonary & Critical Care   [facility]  Securely message with the Vocera Web Console (learn more here)    Interval History  12/05/2024 Hosp Day:15  Nursing notes reviewed  R thora 12/4/24, 1400cc removed  L thora today pending  Still requring 5L oxymask  Patient did feel better after thoracentesis however no change in oxygen requirement  I/O +730 ml  Weight 122 pounds      Code: Full Code   Review of Systems   ROS:  6 point ROS including Respiratory, CV, GI and , other than that  noted in the HPI, is negative     Physical Exam Temp:  [97.4  F (36.3  C)-98.1  F (36.7  C)] 98.1  F (36.7  C)  Pulse:  [51-57] 54  Resp:  [18] 18  BP: (136-181)/(54-67) 151/57  SpO2:  [87 %-95 %] 95 %  I/O last 3 completed shifts:  In: 1530 [P.O.:1530]  Out: 800 [Urine:800]  Wt Readings from Last 1 Encounters:   12/05/24 55.5 kg (122 lb 5.7 oz)    Body mass index is 22.38 kg/m . Resp: 18    Exam:  General: thin female in no acute distress, on HFNC   HENT: external ears without visible abnormalities, no rhinorrhea, no epistaxis  Lungs: fair air flow b/l, no wheezes, rales, rhonchi, diminished in bases b/l  Heart: RRR, holosystolic murmu  Extremities: no pitting edema, R BKA, no clubbing or cyanosis  Skin: no visible rashes, no mottling  Neurologic: moving all 4 extremities spontaneously, awake and alert    Data   Labs: reviewed in EMR and notable labs listed below.  Cr 2.08 (from 2.4)      Imaging: reviewed in EMR and notable imaging listed below.  CXR (12/3/24) moderate effusions, pulmonary edema     CT Chest 11/21  1.  Small to moderate right pleural effusion with adjacent  atelectasis. New patchy consolidative opacity within the right middle  lobe and lower lobe concerning for superimposed pneumonia with  possible component of reexpansion edema.  2.  Moderate left pleural effusion with complete atelectasis of the  left lower lobe.  3.  Mild interstitial pulmonary edema.    PFT 2014  Mild reduction in FEV and FVC, diffusion defect (mild), air trapping present    Medications   Current Facility-Administered Medications   Medication Dose Route Frequency Provider Last Rate Last Admin    amLODIPine (NORVASC) tablet 10 mg  10 mg Oral Daily Yoli Huizar MD   10 mg at 12/04/24 0809    apixaban ANTICOAGULANT (ELIQUIS) tablet 2.5 mg  2.5 mg Oral BID Yoli Huizar MD   2.5 mg at 12/05/24 0546    carvedilol (COREG) tablet 12.5 mg  12.5 mg Oral BID w/meals Yoli Huizar MD   12.5  mg at 12/04/24 1750    clopidogrel (PLAVIX) tablet 75 mg  75 mg Oral Daily Marcin White MD   75 mg at 12/04/24 1108    fluticasone (FLONASE) 50 MCG/ACT spray 1 spray  1 spray Both Nostrils Daily Liane Huertas MD   1 spray at 12/03/24 0834    fluticasone-vilanterol (BREO ELLIPTA) 200-25 MCG/ACT inhaler 1 puff  1 puff Inhalation Daily Marcin White MD   1 puff at 12/04/24 0808    gabapentin (NEURONTIN) capsule 200 mg  200 mg Oral At Bedtime Marcin White MD   200 mg at 12/04/24 2043    isosorbide mononitrate (IMDUR) 24 hr tablet 120 mg  120 mg Oral Daily Yoli Huizar MD   120 mg at 12/04/24 0808    nicotine (NICODERM CQ) 21 MG/24HR 24 hr patch 1 patch  1 patch Transdermal Daily Carlos Hunter MD   1 patch at 12/04/24 0808    Followed by    [START ON 1/2/2025] nicotine (NICODERM CQ) 14 MG/24HR 24 hr patch 1 patch  1 patch Transdermal Daily Carlos Hunter MD        Followed by    [START ON 1/30/2025] nicotine (NICODERM CQ) 7 MG/24HR 24 hr patch 1 patch  1 patch Transdermal Daily Carlos Hunter MD        polyethylene glycol (MIRALAX) Packet 17 g  17 g Oral Daily Yoli Huizar MD   17 g at 12/03/24 0835    rosuvastatin (CRESTOR) tablet 10 mg  10 mg Oral At Bedtime Marcin White MD   10 mg at 12/04/24 2043    sodium chloride (PF) 0.9% PF flush 3 mL  3 mL Intracatheter Q8H Marcin White MD   3 mL at 12/04/24 2043    [Held by provider] valsartan (DIOVAN) tablet 40 mg  40 mg Oral Daily Lisa Zeng PA-C   40 mg at 11/28/24 0828

## 2024-12-05 NOTE — PROGRESS NOTES
Renal Medicine Progress Note            Assessment/Plan:     Shirley Hendricks is a 64 year old female who was admitted on 11/20/2024.      Assessment:  1) chronic kidney disease stage IIIb  Followed by [facility] nephrology, Dr. Barboza and DARREL Roberson, last visit 11/14/2024     Noted history LIMA June/July 2024 requiring dialysis, discontinued 6/24     Hyperkalemia last month, spironolactone discontinued with potassium 6.0.  Potassium normal this admission.    Creatinine worsened in-house after relative hypotensive episodes after escalating blood pressure medications/diuretics/valsartan.  Likely ischemic ATN.  Encouraged to see creatinine plateaued and improving.      2 hypoxic respiratory failure-  -multifactorial with COPD, moderate transudative bilateral pleural effusion with lower lobe consolidation, mild pulmonary edema  normal biventricular filling pressures on RHC.        2 hyponatremia-with acute renal failure.  127    Continue p.o. fluid restriction as ordered.       3) hypertension: Blood pressure reasonable.  Could do with slightly higher perfusion pressures.  Continue to hold valsartan..       Discussion-  Improved renal function.  Sodium fluctuating around 1 27-1 29.  Noted plans for thoracentesis on left side.  Torsemide 20 mg daily  Continue amlodipine, Imdur  Continue with p.o. fluid restriction              Interval History:     Seen/examined.    On facemask oxygen.  Reports breathing is better after 1.4 L thoracentesis on right side yesterday.  Noted plans for thoracentesis on left side today.  Only modest response to Bumex 2 mg IV yesterday.  Creatinine improved to 2.  BUN improving as well.  Sodium 127            Medications and Allergies:     Current Facility-Administered Medications   Medication Dose Route Frequency Provider Last Rate Last Admin    amLODIPine (NORVASC) tablet 10 mg  10 mg Oral Daily Yoli Huizar MD   10 mg at 12/05/24 0837    apixaban ANTICOAGULANT (ELIQUIS)  tablet 2.5 mg  2.5 mg Oral BID Yoli Huizar MD   2.5 mg at 12/05/24 0546    carvedilol (COREG) tablet 12.5 mg  12.5 mg Oral BID w/meals Yoli Huizar MD   12.5 mg at 12/05/24 0836    clopidogrel (PLAVIX) tablet 75 mg  75 mg Oral Daily Marcin White MD   75 mg at 12/04/24 1108    fluticasone (FLONASE) 50 MCG/ACT spray 1 spray  1 spray Both Nostrils Daily Liane Huertas MD   1 spray at 12/05/24 0831    fluticasone-vilanterol (BREO ELLIPTA) 200-25 MCG/ACT inhaler 1 puff  1 puff Inhalation Daily Marcin White MD   1 puff at 12/05/24 0831    gabapentin (NEURONTIN) capsule 200 mg  200 mg Oral At Bedtime Marcin White MD   200 mg at 12/04/24 2043    isosorbide mononitrate (IMDUR) 24 hr tablet 120 mg  120 mg Oral Daily Yoli Huizar MD   120 mg at 12/05/24 0836    nicotine (NICODERM CQ) 21 MG/24HR 24 hr patch 1 patch  1 patch Transdermal Daily Carlos Hunter MD   1 patch at 12/05/24 0834    Followed by    [START ON 1/2/2025] nicotine (NICODERM CQ) 14 MG/24HR 24 hr patch 1 patch  1 patch Transdermal Daily Carlos Hunter MD        Followed by    [START ON 1/30/2025] nicotine (NICODERM CQ) 7 MG/24HR 24 hr patch 1 patch  1 patch Transdermal Daily Carlos Hunter MD        polyethylene glycol (MIRALAX) Packet 17 g  17 g Oral Daily Yoli Huizar MD   17 g at 12/03/24 0835    rosuvastatin (CRESTOR) tablet 10 mg  10 mg Oral At Bedtime Marcin White MD   10 mg at 12/04/24 2043    sodium chloride (PF) 0.9% PF flush 3 mL  3 mL Intracatheter Q8H Marcin White MD   3 mL at 12/04/24 2043    [Held by provider] valsartan (DIOVAN) tablet 40 mg  40 mg Oral Daily Lisa Zeng PA-C   40 mg at 11/28/24 0828        Allergies   Allergen Reactions    Contrast Dye Anaphylaxis     Pt reported facial and throat swelling with prior CT contrast    Pantoprazole      Protonix caused diffuse edema    Chantix [Varenicline]      Terrible dreams    Gluten Meal GI Disturbance      "Pt has celiac disease. Can not have gluten    Penicillins Itching and Rash            Physical Exam:   Vitals were reviewed  BP (!) 159/66 (BP Location: Left arm, Patient Position: Semi-Myers's, Cuff Size: Adult Regular)   Pulse 57   Temp 97.9  F (36.6  C) (Oral)   Resp 18   Ht 1.575 m (5' 2\")   Wt 55.5 kg (122 lb 5.7 oz)   LMP  (LMP Unknown)   SpO2 91%   BMI 22.38 kg/m      Wt Readings from Last 3 Encounters:   12/05/24 55.5 kg (122 lb 5.7 oz)   09/23/24 44.8 kg (98 lb 12.8 oz)   08/15/24 47.2 kg (104 lb)           GENERAL: Comfortable.  On facemask oxygen  HEENT:  Normocephalic. No gross abnormalities  CV: RRR, 1/6 systolic murmur, no dependent edema or in left lower extremity  No edema noted  RESP: Diminished bilateral bases  GI: Abdomen soft/nt/nd, BS normal.   MUSCULOSKELETAL: Right leg amputation.  No edema  SKIN: no suspicious lesions or rashes, dry to touch  PSYCH: mood good, affect appropriate           Data:     BMP  Recent Labs   Lab 12/05/24  0713 12/04/24  0653 12/03/24  0655 12/02/24  0643   * 129* 127* 127*   POTASSIUM 3.9 4.1 4.1 3.6   CHLORIDE 91* 92* 89* 88*   SONIA 7.6* 7.8* 7.5* 7.1*   CO2 23 23 22 22   .8* 123.3* 122.7* 117.3*   CR 2.08* 2.41* 2.67* 2.94*   GLC 83 78 89 82     CBC  Recent Labs   Lab 12/05/24  0713 12/04/24  1356 12/04/24  0653 12/03/24  0655 12/02/24  0643   WBC 5.9  --  5.9 7.5 7.7   HGB 8.9*  --  9.1* 9.3* 9.9*   HCT 27.3*  --  28.1* 26.7* 30.1*   MCV 82  --  82 80 80   * 146* 139* 145* 185     Lab Results   Component Value Date    AST 16 11/21/2024    ALT 15 11/21/2024    ALKPHOS 66 11/21/2024    BILITOTAL 0.2 11/21/2024    BILICONJ 0.0 01/23/2004     Lab Results   Component Value Date    INR 1.24 (H) 12/04/2024       Attestation:  I have reviewed today's vital signs, notes, medications, labs and imaging.    Nereida Vaz MD  [facility] - Nephrology  Office: [phone]      "

## 2024-12-05 NOTE — PROGRESS NOTES
[facility]    Hospitalist Progress Note    Interval History   Patient awake and alert.  Was seen after her thoracentesis.  Currently on BiPAP.  Respiratory status marginally improved after thoracentesis.    -Data reviewed today: I reviewed all new labs and imaging results over the last 24 hours. I personally reviewed the chest CT image(s) showing small bilateral pleural effusions.  Tiny left basilar pneumothorax related to recent thoracentesis.  Near complete atelectasis of the right lower lobe has worsened and dense atelectasis in the left lower lobe has improved since 11/21/2024.  Previously groundglass opacities in the right middle lobe have resolved.  Mild pulmonary edema. .    Physical Exam   Temp: 97.4  F (36.3  C) Temp src: Oral BP: (!) 143/62 Pulse: 52   Resp: 16 SpO2: 90 % O2 Device: Nasal cannula Oxygen Delivery: 7 LPM  Vitals:    12/03/24 0628 12/04/24 0700 12/05/24 0654   Weight: 54.8 kg (120 lb 13 oz) 55 kg (121 lb 4.1 oz) 55.5 kg (122 lb 5.7 oz)     Vital Signs with Ranges  Temp:  [97.4  F (36.3  C)-98.1  F (36.7  C)] 97.4  F (36.3  C)  Pulse:  [52-57] 52  Resp:  [16-18] 16  BP: (143-159)/(55-66) 143/62  SpO2:  [90 %-95 %] 90 %  I/O last 3 completed shifts:  In: 830 [P.O.:830]  Out: 800 [Urine:800]    Physical Exam  Constitutional:       Appearance: She is ill-appearing.   Cardiovascular:      Rate and Rhythm: Normal rate and regular rhythm.      Pulses: Normal pulses.      Heart sounds: Normal heart sounds.   Pulmonary:      Effort: Respiratory distress present.      Breath sounds: Normal breath sounds.      Comments: Bilateral coarse breath sounds  Abdominal:      General: Abdomen is flat. Bowel sounds are normal. There is no distension.      Tenderness: There is no abdominal tenderness. There is no guarding.   Musculoskeletal:      Comments: Right above-knee amputation   Skin:     General: Skin is warm and dry.   Neurological:      General: No focal deficit present.            Medications   Current Facility-Administered Medications   Medication Dose Route Frequency Provider Last Rate Last Admin    No lozenges or gum should be given while patient on BIPAP/AVAPS/AVAPS AE   Does not apply Continuous PRN Ricardo Wheeler MD        Patient may continue current oral medications   Does not apply Continuous PRN Ricardo Wheeler MD         Current Facility-Administered Medications   Medication Dose Route Frequency Provider Last Rate Last Admin    amLODIPine (NORVASC) tablet 10 mg  10 mg Oral Daily Yoli Huizar MD   10 mg at 12/05/24 0837    apixaban ANTICOAGULANT (ELIQUIS) tablet 2.5 mg  2.5 mg Oral BID Yoli Huizar MD   2.5 mg at 12/05/24 0546    carvedilol (COREG) tablet 12.5 mg  12.5 mg Oral BID w/meals Yoli Huizar MD   12.5 mg at 12/05/24 0836    clopidogrel (PLAVIX) tablet 75 mg  75 mg Oral Daily Marcin White MD   75 mg at 12/05/24 1257    fluticasone (FLONASE) 50 MCG/ACT spray 1 spray  1 spray Both Nostrils Daily Liane Huertas MD   1 spray at 12/05/24 0831    fluticasone-vilanterol (BREO ELLIPTA) 200-25 MCG/ACT inhaler 1 puff  1 puff Inhalation Daily Marcin White MD   1 puff at 12/05/24 0831    gabapentin (NEURONTIN) capsule 200 mg  200 mg Oral At Bedtime Marcin White MD   200 mg at 12/04/24 2043    isosorbide mononitrate (IMDUR) 24 hr tablet 120 mg  120 mg Oral Daily Yoli Huizar MD   120 mg at 12/05/24 0836    nicotine (NICODERM CQ) 21 MG/24HR 24 hr patch 1 patch  1 patch Transdermal Daily Carlos Hunter MD   1 patch at 12/05/24 0834    Followed by    [START ON 1/2/2025] nicotine (NICODERM CQ) 14 MG/24HR 24 hr patch 1 patch  1 patch Transdermal Daily Carlos Hunter MD        Followed by    [START ON 1/30/2025] nicotine (NICODERM CQ) 7 MG/24HR 24 hr patch 1 patch  1 patch Transdermal Daily Carlos Hunter MD        polyethylene glycol (MIRALAX) Packet 17 g  17 g Oral Daily Bhupendra  Yoli Villafana MD   17 g at 12/03/24 0835    rosuvastatin (CRESTOR) tablet 10 mg  10 mg Oral At Bedtime Marcin White MD   10 mg at 12/04/24 2043    sodium chloride (PF) 0.9% PF flush 3 mL  3 mL Intracatheter Q8H Marcin White MD   3 mL at 12/05/24 1258    torsemide (DEMADEX) tablet 20 mg  20 mg Oral Daily Nereida Vaz MD   20 mg at 12/05/24 1257    [Held by provider] valsartan (DIOVAN) tablet 40 mg  40 mg Oral Daily Lisa Zeng PA-C   40 mg at 11/28/24 0828       Data   Recent Labs   Lab 12/05/24  0713 12/04/24  1356 12/04/24  0653 12/03/24  0655   WBC 5.9  --  5.9 7.5   HGB 8.9*  --  9.1* 9.3*   MCV 82  --  82 80   * 146* 139* 145*   INR  --  1.24*  --   --    *  --  129* 127*   POTASSIUM 3.9  --  4.1 4.1   CHLORIDE 91*  --  92* 89*   CO2 23  --  23 22   .8*  --  123.3* 122.7*   CR 2.08*  --  2.41* 2.67*   ANIONGAP 13  --  14 16*   SONIA 7.6*  --  7.8* 7.5*   GLC 83  --  78 89       Recent Results (from the past 24 hours)   CT Chest w/o Contrast    Narrative    CT CHEST W/O CONTRAST 12/5/2024 12:30 PM    CLINICAL HISTORY: Post thora. Evaluation for chest expansion.    TECHNIQUE: CT chest without IV contrast. Multiplanar reformats were  obtained. Dose reduction techniques were used.  CONTRAST: None.    COMPARISON: 11/21/2024, chest x-ray 12/3/2024    FINDINGS:   LUNGS AND PLEURA: Small bilateral pleural effusions, right greater  than left, improved since 11/21/2024. Near complete atelectasis of the  right lower lobe has increased since 11/21/2024. Dense atelectasis in  the left lower lobe has slightly improved since 11/21/2024. Previously  seen groundglass opacities in the right middle lobe have resolved.  Mild linear intralobular septal thickening may be due to mild  pulmonary edema. Mild atelectasis in the medial left upper lobe. No  discrete pulmonary nodules. Tiny left pneumothorax, likely related to  thoracentesis (series 4, image 222).    MEDIASTINUM/AXILLAE: Nodules in the thyroid  gland measuring up to 1.1  cm in the right lobe. Stable mildly enlarged mediastinal and hilar  lymph nodes, nonspecific and likely reactive. No thoracic aortic  aneurysm. Severe coronary artery calcifications. Trace pericardial  effusion. Mild cardiomegaly. Small hiatal hernia.    UPPER ABDOMEN: No significant finding.    MUSCULOSKELETAL: No suspicious lesions in the bones.      Impression    IMPRESSION:   1.  Small bilateral pleural effusions have decreased since 11/21/2024.  2.  Tiny left basilar pneumothorax, likely related to recent  thoracentesis.  3.  Near complete atelectasis of the right lower lobe has worsened,  and dense atelectasis in the left lower lobe has improved since  11/21/2024. Previously seen groundglass opacities in the right middle  lobe have resolved.  4.  Mild pulmonary edema.    BRANDON ALBERTS MD         SYSTEM ID:  [id number]         Assessment & Plan     Shirley Hendricks is a 64 year old female, on Plavix, with a history of COPD, hypertension, CHF, peripheral artery disease, NSTEMI, stage 4 CKD, and tobacco use who is being admitted for evaluation of shortness of breath.      Acute hypoxic respiratory failure-multifactorial.  Acute exacerbation of heart failure with preserved EF.  Moderate to severe left pleural effusion status post thoracentesis.  -Presents with 2-day history of increasing shortness of breath and a cough.  On admission chest x-ray shows a large left pleural effusion with compressive atelectasis.  There is also diffuse interstitial opacities consistent with pulmonary edema.    -Labs pertinent for a proBNP [place] [id number]   -Patient underwent bilateral thoracentesis initially on 11/20 and the following day with good improvement in her symptoms.  -Fluid consistent with transudate effusion. Cultures negative. Cytology negative for malignancy  -Completed 5 days of corticosteroid  -TTE done during this hospitalization on 11/22/2024 showed LVEF normal at 55 to 60%.  RV function  normal.  Mild 1+ MR, 1+ TR.  Mild pulmonary hypertension.  Compared to prior study there is no significant change  -Completed treatment for community-acquired pneumonia with cefuroxime and azithromycin  -Right heart catheterization done on 11/27/24 showed normal pressures, however this was after several  doses of diuretics.  Weight at that time was close to 104 pounds.  -Patient's oxygenation initially improved with aggressive diuretics and thoracentesis, however in the last couple of days she has been more hypoxic now requiring 5 L of oxygen as she was on a diuretic holiday due to worsening renal function.  -Repeat chest x-ray performed on 12/3/2024 shows persistent pulmonary edema with ongoing recurrent bilateral pleural effusion.  Weight has gone up to 122 pounds on 12/5/2024  -Discussed with pulmonary and nephrology .  Received one-time dose of Bumex 2 mg on 12/4/2024  -Underwent repeat right-sided thoracentesis with removal of 1.4 L on 12/4/2024 and left-sided thoracentesis with removal of 1.1 L fluid.  -CT chest without contrast was obtained after left-sided thoracentesis on 12/5/2024 that continued to show significant atelectasis of the right and left lung bases.  There is concern for trapped lung causing recurrent pleural fluid reaccumulation.  CT chest also showed a tiny pneumothorax.  Will repeat chest x-ray tomorrow.  -Pulmonary ordered for BiPAP twice a day to try and recruit the lung and prevent further pleural effusions.     Community Acquired Pnuemonia  COPD with possible acute exacerbation  -Patient initially presented with acute hypoxic respiratory failure, initially felt to be due to acute exacerbation of heart failure with preserved EF  -Initially she was diuresed, also received thoracentesis with improvement in oxygenation however now continues to require oxygen at 2-3 L  -Right heart cath showed normal pressures, cardiology feels that there is a significant component of COPD which is  contributing to her symptoms and hypoxia  -Already completed 5 days steroid course and a round of antibiotics  -As mentioned above pulmonary following  -Most likely her hypoxia at this point is multifactorial with definite component of heart failure.  COPD is definitely contributing.  -Post repeat bilateral thoracentesis.  Continue to monitor for now.     Hypertensive urgency  New onset A-fib with RVR  Hyperlipidemia  History of coronary artery disease  Type II NSTEMI likely due to demand due to heart failure exacerbation.  -Initially found to be in A-fib, now converted to normal sinus rhythm  -Continue apixaban, currently on 2.5 mg twice a day based on body weight and renal function  -Prior to admission atenolol changed to carvedilol due to refractory hypertension  -Continue amlodipine 10 mg daily  -Continue Plavix which she takes for both history of coronary artery disease and peripheral arterial disease  -Isosorbide mononitrate increased to 120 mg daily  -Continue Crestor     Hyponatremia  -Sodium decreased from 133 on presentation to 121   -Nephrology following  -Did require 2% saline initially, currently at 127.  -Continue 2 L fluid restriction  -Nephrology following  -Daily BMP     Acute kidney injury on CKD stage III  Suspected ATN secondary to hypotension and ARB + diuretic  Borderline hyperkalemia  -Creatinine on admission of 1.71, which worsened with diuresis and relative hypotension.  -Creatinine peaked at 3.78 and now downtrending, good urine output  -Creatinine at 2.41 on 12/4/2024.  Received a diuretic challenge with Bumex 2 mg IV once.  Creatinine improved to 2.08 but sodium worsened to 127.  -Started on torsemide 20 mg daily.  Repeat BMP in the morning.     GERD  -Continue with PTA famotidine     Anemia of Chronic Disease  Baseline hemoglobin has been between 8-9 over the past few months  -Hemoglobin overall stable between 9-10     Peripheral vascular disease with history of 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  2024  -Continue Plavix and statin  -Apixaban added for A-fib     History of thrombocytopenia  -Platelet count normal on admission, slightly at the moment but stable at 146.     Tobacco use  - Continue with nicotine patch  - Counselled on cessation          Clinically Significant Risk Factors      # Hyponatremia: Lowest Na = 127 mmol/L in last 2 days, will monitor as appropriate  # Hypochloremia: Lowest Cl = 91 mmol/L in last 2 days, will monitor as appropriate      # Hypoalbuminemia: Lowest albumin = 3 g/dL at 11/21/2024  4:42 AM, will monitor as appropriate  # Coagulation Defect: INR = 1.24 (Ref range: 0.85 - 1.15) and/or PTT = 82 Seconds (Ref range: 22 - 38 Seconds), will monitor for bleeding    # Hypertension: Noted on problem list             # Moderate Malnutrition: based on nutrition assessment    # Financial/Environmental Concerns: none          DVT Prophylaxis: DOAC  Code Status: Full Code  Medically Ready for Discharge: Anticipated in 2-4 Days    Greater than 50 minutes spent on documentation review, lab review, imaging review, examining the patient and discussing care with pulmonary team and with nurse at bedside    Magaly Frye MD, MD  [phone](p)

## 2024-12-05 NOTE — PROGRESS NOTES
"SPIRITUAL HEALTH SERVICES Progress Note  [facility]. [address] medical specialties    Referral Source/Reason for Visit: follow up for ongoing emotional and grief support    Assessment    Saw pt Shirley Hendricks at bedside for 25 minutes.    Patient / Family Understanding of Illness and Goals of Care - Shirley spoke of seeing progress, feeling better, shared about different treatments for her lungs to drain fluid and increase capacity stating she is fearful of the possibility of using a bipap at night again.   Shirley said she is expecting to be hospitalized \"a couple more days for sure.\"    Distress and Loss - Shirley reflected on \"the lost month,\" recalling her hospitalization in March-April 2023 and resulting leg amputation. Her sense of loss as she does not remember much of the time, her fears and challenges of the rehabilitation process.   Shirley spoke of the challenges of change and acceptance of needing a gluten free diet (new diagnosis this past spring). As she shared about a helpful conversation with nursing staff during dialysis, I encouraged her to continue to \"think outside the box\" and explore new ways she may enjoy old favorite foods.    Strengths, Coping, and Resources - Shirley brightened today as she talked about her love of cooking, making meals and some favorite holiday treats when she returns home. She also looks forward to seeing her new kitten again - and shared a memory of picking out kittens for her and her daughter.    Meaning, Beliefs, and Spirituality -     Plan: unit  will continue to follow; visit 1-2 times weekly.    Gwendolyn Dukes MD[facility]  Staff   Please place consult order for routine Spiritual Health Services referrals.  [facility] available 24/7 for emergent requests either by having the on-call  paged or by entering an ASAP/STAT consult in Epic (this will also page the on-call ).  "

## 2024-12-06 ENCOUNTER — APPOINTMENT (OUTPATIENT)
Dept: GENERAL RADIOLOGY | Facility: CLINIC | Age: 64
DRG: 280 | End: 2024-12-06
Attending: HOSPITALIST
Payer: COMMERCIAL

## 2024-12-06 LAB
ANION GAP SERPL CALCULATED.3IONS-SCNC: 13 MMOL/L (ref 7–15)
BUN SERPL-MCNC: 111.8 MG/DL (ref 8–23)
CALCIUM SERPL-MCNC: 7.9 MG/DL (ref 8.8–10.4)
CHLORIDE SERPL-SCNC: 91 MMOL/L (ref 98–107)
CREAT SERPL-MCNC: 2.06 MG/DL (ref 0.51–0.95)
EGFRCR SERPLBLD CKD-EPI 2021: 26 ML/MIN/1.73M2
ERYTHROCYTE [DISTWIDTH] IN BLOOD BY AUTOMATED COUNT: 14.8 % (ref 10–15)
GLUCOSE BLDC GLUCOMTR-MCNC: 140 MG/DL (ref 70–99)
GLUCOSE BLDC GLUCOMTR-MCNC: 89 MG/DL (ref 70–99)
GLUCOSE SERPL-MCNC: 79 MG/DL (ref 70–99)
HCO3 SERPL-SCNC: 23 MMOL/L (ref 22–29)
HCT VFR BLD AUTO: 28.7 % (ref 35–47)
HGB BLD-MCNC: 9.2 G/DL (ref 11.7–15.7)
MCH RBC QN AUTO: 26.1 PG (ref 26.5–33)
MCHC RBC AUTO-ENTMCNC: 32.1 G/DL (ref 31.5–36.5)
MCV RBC AUTO: 82 FL (ref 78–100)
PLATELET # BLD AUTO: 143 10E3/UL (ref 150–450)
POTASSIUM SERPL-SCNC: 4.2 MMOL/L (ref 3.4–5.3)
RBC # BLD AUTO: 3.52 10E6/UL (ref 3.8–5.2)
SODIUM SERPL-SCNC: 127 MMOL/L (ref 135–145)
WBC # BLD AUTO: 5.6 10E3/UL (ref 4–11)

## 2024-12-06 PROCEDURE — 94640 AIRWAY INHALATION TREATMENT: CPT | Mod: 76

## 2024-12-06 PROCEDURE — 250N000013 HC RX MED GY IP 250 OP 250 PS 637: Performed by: STUDENT IN AN ORGANIZED HEALTH CARE EDUCATION/TRAINING PROGRAM

## 2024-12-06 PROCEDURE — 80048 BASIC METABOLIC PNL TOTAL CA: CPT | Performed by: INTERNAL MEDICINE

## 2024-12-06 PROCEDURE — 85041 AUTOMATED RBC COUNT: CPT | Performed by: STUDENT IN AN ORGANIZED HEALTH CARE EDUCATION/TRAINING PROGRAM

## 2024-12-06 PROCEDURE — 71046 X-RAY EXAM CHEST 2 VIEWS: CPT

## 2024-12-06 PROCEDURE — 120N000001 HC R&B MED SURG/OB

## 2024-12-06 PROCEDURE — 250N000013 HC RX MED GY IP 250 OP 250 PS 637: Performed by: INTERNAL MEDICINE

## 2024-12-06 PROCEDURE — 250N000009 HC RX 250: Performed by: STUDENT IN AN ORGANIZED HEALTH CARE EDUCATION/TRAINING PROGRAM

## 2024-12-06 PROCEDURE — 99233 SBSQ HOSP IP/OBS HIGH 50: CPT | Performed by: STUDENT IN AN ORGANIZED HEALTH CARE EDUCATION/TRAINING PROGRAM

## 2024-12-06 PROCEDURE — 99232 SBSQ HOSP IP/OBS MODERATE 35: CPT | Performed by: HOSPITALIST

## 2024-12-06 PROCEDURE — 36415 COLL VENOUS BLD VENIPUNCTURE: CPT | Performed by: INTERNAL MEDICINE

## 2024-12-06 PROCEDURE — 999N000157 HC STATISTIC RCP TIME EA 10 MIN

## 2024-12-06 PROCEDURE — 99232 SBSQ HOSP IP/OBS MODERATE 35: CPT | Performed by: INTERNAL MEDICINE

## 2024-12-06 PROCEDURE — 85014 HEMATOCRIT: CPT | Performed by: STUDENT IN AN ORGANIZED HEALTH CARE EDUCATION/TRAINING PROGRAM

## 2024-12-06 RX ORDER — IPRATROPIUM BROMIDE AND ALBUTEROL SULFATE 2.5; .5 MG/3ML; MG/3ML
3 SOLUTION RESPIRATORY (INHALATION)
Status: DISCONTINUED | OUTPATIENT
Start: 2024-12-06 | End: 2024-12-13 | Stop reason: HOSPADM

## 2024-12-06 RX ORDER — ACETYLCYSTEINE 200 MG/ML
4 SOLUTION ORAL; RESPIRATORY (INHALATION)
Status: DISCONTINUED | OUTPATIENT
Start: 2024-12-06 | End: 2024-12-13

## 2024-12-06 RX ORDER — TORSEMIDE 20 MG/1
20 TABLET ORAL
Status: DISCONTINUED | OUTPATIENT
Start: 2024-12-06 | End: 2024-12-13 | Stop reason: HOSPADM

## 2024-12-06 RX ADMIN — ACETAMINOPHEN 1000 MG: 500 TABLET, FILM COATED ORAL at 20:13

## 2024-12-06 RX ADMIN — FLUTICASONE FUROATE AND VILANTEROL TRIFENATATE 1 PUFF: 200; 25 POWDER RESPIRATORY (INHALATION) at 08:36

## 2024-12-06 RX ADMIN — IPRATROPIUM BROMIDE AND ALBUTEROL SULFATE 3 ML: .5; 3 SOLUTION RESPIRATORY (INHALATION) at 11:47

## 2024-12-06 RX ADMIN — ISOSORBIDE MONONITRATE 120 MG: 60 TABLET, EXTENDED RELEASE ORAL at 08:33

## 2024-12-06 RX ADMIN — NICOTINE 1 PATCH: 21 PATCH, EXTENDED RELEASE TRANSDERMAL at 08:34

## 2024-12-06 RX ADMIN — IPRATROPIUM BROMIDE AND ALBUTEROL SULFATE 3 ML: .5; 3 SOLUTION RESPIRATORY (INHALATION) at 19:12

## 2024-12-06 RX ADMIN — ACETYLCYSTEINE 4 ML: 200 SOLUTION ORAL; RESPIRATORY (INHALATION) at 19:12

## 2024-12-06 RX ADMIN — GABAPENTIN 200 MG: 100 CAPSULE ORAL at 20:13

## 2024-12-06 RX ADMIN — IPRATROPIUM BROMIDE AND ALBUTEROL SULFATE 3 ML: .5; 3 SOLUTION RESPIRATORY (INHALATION) at 15:08

## 2024-12-06 RX ADMIN — APIXABAN 2.5 MG: 2.5 TABLET, FILM COATED ORAL at 20:22

## 2024-12-06 RX ADMIN — FLUTICASONE PROPIONATE 1 SPRAY: 50 SPRAY, METERED NASAL at 08:36

## 2024-12-06 RX ADMIN — ROSUVASTATIN CALCIUM 10 MG: 10 TABLET, FILM COATED ORAL at 20:13

## 2024-12-06 RX ADMIN — ACETYLCYSTEINE 4 ML: 200 SOLUTION ORAL; RESPIRATORY (INHALATION) at 15:08

## 2024-12-06 RX ADMIN — CARVEDILOL 12.5 MG: 12.5 TABLET, FILM COATED ORAL at 08:33

## 2024-12-06 RX ADMIN — AMLODIPINE BESYLATE 10 MG: 10 TABLET ORAL at 08:33

## 2024-12-06 RX ADMIN — TORSEMIDE 20 MG: 20 TABLET ORAL at 08:33

## 2024-12-06 RX ADMIN — ACETYLCYSTEINE 4 ML: 200 SOLUTION ORAL; RESPIRATORY (INHALATION) at 11:47

## 2024-12-06 RX ADMIN — APIXABAN 2.5 MG: 2.5 TABLET, FILM COATED ORAL at 09:03

## 2024-12-06 RX ADMIN — TORSEMIDE 20 MG: 20 TABLET ORAL at 16:30

## 2024-12-06 RX ADMIN — CLOPIDOGREL BISULFATE 75 MG: 75 TABLET ORAL at 12:47

## 2024-12-06 ASSESSMENT — ACTIVITIES OF DAILY LIVING (ADL)
ADLS_ACUITY_SCORE: 56
ADLS_ACUITY_SCORE: 54
ADLS_ACUITY_SCORE: 54
ADLS_ACUITY_SCORE: 56
ADLS_ACUITY_SCORE: 54
ADLS_ACUITY_SCORE: 56
ADLS_ACUITY_SCORE: 54
ADLS_ACUITY_SCORE: 56
ADLS_ACUITY_SCORE: 56
ADLS_ACUITY_SCORE: 54
ADLS_ACUITY_SCORE: 56
ADLS_ACUITY_SCORE: 56
ADLS_ACUITY_SCORE: 54
ADLS_ACUITY_SCORE: 54
ADLS_ACUITY_SCORE: 56
ADLS_ACUITY_SCORE: 54
ADLS_ACUITY_SCORE: 56
ADLS_ACUITY_SCORE: 54

## 2024-12-06 NOTE — PLAN OF CARE
Goal Outcome Evaluation:                      DATE & TIME:12/5/24 [id number]  Cognitive Concerns/ Orientation : A&O x 4, anxious at times, cooperative    BEHAVIOR & AGGRESSION TOOL COLOR: Green   CIWA SCORE: NA    ABNL VS/O2: HR christel in 50s, BP slightly elevated, on 6L sating 90-92%. EMMANUEL, desat to low 80s with minimal activity  MOBILITY: R.AKA due to vascular disease per patient-wheelchair bound at baseline, able to turn/repo independently and can transfer from bed to wheelchair with assist of 1  PAIN MANAGMENT: denies pain   DIET: Renal, 1200 mL FR. Strict I/Os.   BOWEL/BLADDER: intermittent incontinence, purewick in place, no BM  ABNL LAB/BG: Hgb 9.1, hematocrit 28.1, Plt 146, INR 1.24, Cr 2.41, Na 139  DRAIN/DEVICES: PIV SL  TELEMETRY RHYTHM: NA   SKIN: dusky, blanchable redness to coccyx. Old scar to R. Upper thigh from previous surgeries   TESTS/PROCEDURES: s/p Right sided US thoracentesis 12/5, s/p Left US thoracentesis 12/5, followed with chest CT scan  D/C DATE: TBD  Discharge Barriers: creatinine and O2 need improvement  OTHER IMPORTANT INFO: Pt alert/cooperative, slept well, Rt thora 12/4 removed 1450 ml, and removed 1100 mL from Lt thoracentesis 12/5-dressings CDI. New order in place for BiPAP 1 hour twice daily, RT aware. Nicotine patch on Rt  arm. Nephrology and Pulmonology following.

## 2024-12-06 NOTE — PROGRESS NOTES
[facility]    Hospitalist Progress Note    Interval History   Patient awake and alert.  Continuing to need 6 L nasal.  Continues to have dyspnea but improved since paracentesis.    -Data reviewed today: I reviewed all new labs and imaging results over the last 24 hours. I personally reviewed the chest CT image(s) showing small bilateral pleural effusions.  Tiny left basilar pneumothorax related to recent thoracentesis.  Near complete atelectasis of the right lower lobe has worsened and dense atelectasis in the left lower lobe has improved since 11/21/2024.  Previously groundglass opacities in the right middle lobe have resolved.  Mild pulmonary edema. .    Physical Exam   Temp: 97.6  F (36.4  C) Temp src: Oral BP: (!) 154/62 Pulse: 56   Resp: 16 SpO2: 92 % O2 Device: Nasal cannula with humidification Oxygen Delivery: 6 LPM  Vitals:    12/05/24 0654 12/06/24 0500 12/06/24 1300   Weight: 55.5 kg (122 lb 5.7 oz) 55.8 kg (123 lb 0.3 oz) 50 kg (110 lb 3.7 oz)     Vital Signs with Ranges  Temp:  [97.5  F (36.4  C)-98.4  F (36.9  C)] 97.6  F (36.4  C)  Pulse:  [53-57] 56  Resp:  [16-18] 16  BP: (142-162)/(59-62) 154/62  FiO2 (%):  [50 %] 50 %  SpO2:  [92 %-94 %] 92 %  I/O last 3 completed shifts:  In: 938 [P.O.:938]  Out: 150 [Urine:150]    Physical Exam  Constitutional:       Appearance: She is ill-appearing.   Cardiovascular:      Rate and Rhythm: Normal rate and regular rhythm.      Pulses: Normal pulses.      Heart sounds: Normal heart sounds.   Pulmonary:      Effort: Respiratory distress present.      Breath sounds: Normal breath sounds.      Comments: Bilateral coarse breath sounds  Abdominal:      General: Abdomen is flat. Bowel sounds are normal. There is no distension.      Tenderness: There is no abdominal tenderness. There is no guarding.   Musculoskeletal:      Comments: Right above-knee amputation   Skin:     General: Skin is warm and dry.   Neurological:      General: No focal  deficit present.           Medications   Current Facility-Administered Medications   Medication Dose Route Frequency Provider Last Rate Last Admin    No lozenges or gum should be given while patient on BIPAP/AVAPS/AVAPS AE   Does not apply Continuous PRN Ricardo Wheeler MD        Patient may continue current oral medications   Does not apply Continuous PRN Ricardo Wheeler MD         Current Facility-Administered Medications   Medication Dose Route Frequency Provider Last Rate Last Admin    acetylcysteine (MUCOMYST) 20 % nebulizer solution 4 mL  4 mL Nebulization 4x daily Ricardo Wheeler MD   4 mL at 12/06/24 1508    amLODIPine (NORVASC) tablet 10 mg  10 mg Oral Daily Yoli Huizar MD   10 mg at 12/06/24 0833    apixaban ANTICOAGULANT (ELIQUIS) tablet 2.5 mg  2.5 mg Oral BID Yoli Huizar MD   2.5 mg at 12/06/24 0903    carvedilol (COREG) tablet 12.5 mg  12.5 mg Oral BID w/meals Yoli Huizar MD   12.5 mg at 12/06/24 0833    clopidogrel (PLAVIX) tablet 75 mg  75 mg Oral Daily Marcin White MD   75 mg at 12/06/24 1247    fluticasone (FLONASE) 50 MCG/ACT spray 1 spray  1 spray Both Nostrils Daily Liane Huertas MD   1 spray at 12/06/24 0836    fluticasone-vilanterol (BREO ELLIPTA) 200-25 MCG/ACT inhaler 1 puff  1 puff Inhalation Daily Marcin White MD   1 puff at 12/06/24 0836    gabapentin (NEURONTIN) capsule 200 mg  200 mg Oral At Bedtime Marcin White MD   200 mg at 12/05/24 2017    ipratropium - albuterol 0.5 mg/2.5 mg/3 mL (DUONEB) neb solution 3 mL  3 mL Nebulization 4x daily Ricardo Wheeler MD   3 mL at 12/06/24 1508    isosorbide mononitrate (IMDUR) 24 hr tablet 120 mg  120 mg Oral Daily Yoli Huizar MD   120 mg at 12/06/24 0833    nicotine (NICODERM CQ) 21 MG/24HR 24 hr patch 1 patch  1 patch Transdermal Daily Carlos Hunter MD   1 patch at 12/06/24 0834    Followed by    [START ON 1/2/2025] nicotine (NICODERM CQ)  14 MG/24HR 24 hr patch 1 patch  1 patch Transdermal Daily Carlos Hunter MD        Followed by    [START ON 1/30/2025] nicotine (NICODERM CQ) 7 MG/24HR 24 hr patch 1 patch  1 patch Transdermal Daily Carlos Hunter MD        polyethylene glycol (MIRALAX) Packet 17 g  17 g Oral Daily Yoli Huizar MD   17 g at 12/03/24 0835    rosuvastatin (CRESTOR) tablet 10 mg  10 mg Oral At Bedtime Marcin White MD   10 mg at 12/05/24 2017    sodium chloride (PF) 0.9% PF flush 3 mL  3 mL Intracatheter Q8H Marcin White MD   3 mL at 12/06/24 1631    torsemide (DEMADEX) tablet 20 mg  20 mg Oral BID Nereida Vaz MD   20 mg at 12/06/24 1630    [Held by provider] valsartan (DIOVAN) tablet 40 mg  40 mg Oral Daily Lisa Zeng PA-C   40 mg at 11/28/24 0828       Data   Recent Labs   Lab 12/06/24  1347 12/06/24  0756 12/06/24  0705 12/05/24  0713 12/04/24  1356 12/04/24  0653   WBC  --   --  5.6 5.9  --  5.9   HGB  --   --  9.2* 8.9*  --  9.1*   MCV  --   --  82 82  --  82   PLT  --   --  143* 138* 146* 139*   INR  --   --   --   --  1.24*  --    NA  --   --  127* 127*  --  129*   POTASSIUM  --   --  4.2 3.9  --  4.1   CHLORIDE  --   --  91* 91*  --  92*   CO2  --   --  23 23  --  23   BUN  --   --  111.8* 110.8*  --  123.3*   CR  --   --  2.06* 2.08*  --  2.41*   ANIONGAP  --   --  13 13  --  14   SONIA  --   --  7.9* 7.6*  --  7.8*   GLC 89 140* 79 83  --  78       Recent Results (from the past 24 hours)   XR Chest 2 Views    Narrative    XR CHEST 2 VIEWS  12/6/2024 12:24 PM       INDICATION: Evaluation for pneumothorax and improvement of  atelectasis.  COMPARISON: 12/5/2024       Impression    IMPRESSION: Small left and moderate right pleural effusions have not  appreciably changed. There is atelectasis/consolidation in both lower  lobes, also similar. Tiny left pneumothorax measuring a few  millimeters at the apex.    RAGINI DEVINE MD         SYSTEM ID:  [id number]         Assessment & Plan     Shirley Chawla  Eladio is a 64 year old female, on Plavix, with a history of COPD, hypertension, CHF, peripheral artery disease, NSTEMI, stage 4 CKD, and tobacco use who is being admitted for evaluation of shortness of breath.      Acute hypoxic respiratory failure-multifactorial.  Acute exacerbation of heart failure with preserved EF.  Moderate to severe left pleural effusion status post thoracentesis.  -Presents with 2-day history of increasing shortness of breath and a cough.  On admission chest x-ray shows a large left pleural effusion with compressive atelectasis.  There is also diffuse interstitial opacities consistent with pulmonary edema.    -Labs pertinent for a proBNP of 05741   -Patient underwent bilateral thoracentesis initially on 11/20 and the following day with good improvement in her symptoms.  -Fluid consistent with transudate effusion. Cultures negative. Cytology negative for malignancy  -Completed 5 days of corticosteroid  -TTE done during this hospitalization on 11/22/2024 showed LVEF normal at 55 to 60%.  RV function normal.  Mild 1+ MR, 1+ TR.  Mild pulmonary hypertension.  Compared to prior study there is no significant change  -Completed treatment for community-acquired pneumonia with cefuroxime and azithromycin  -Right heart catheterization done on 11/27/24 showed normal pressures, however this was after several  doses of diuretics.  Weight at that time was close to 104 pounds.  -Patient's oxygenation initially improved with aggressive diuretics and thoracentesis, however i patient started getting more hypoxic again on 12/3/2024 to a point of needing up to 5 L nasal cannula.  -Repeat chest x-ray performed on 12/3/2024 shows persistent pulmonary edema with ongoing recurrent bilateral pleural effusion.  Weight has gone up to 122 pounds on 12/5/2024  -Discussed with pulmonary and nephrology .  Received one-time dose of Bumex 2 mg on 12/4/2024  -Underwent repeat right-sided thoracentesis with removal of 1.4  L on 12/4/2024 and left-sided thoracentesis on 12/5/2024 with removal of 1.1 L fluid.  -CT chest without contrast was obtained after left-sided thoracentesis on 12/5/2024 that continued to show significant atelectasis of the right and left lung bases.  There is concern for trapped lung causing recurrent pleural fluid reaccumulation.  CT chest also showed a tiny pneumothorax.  Repeat chest x-ray on 12/6/2024 continues to show bibasilar atelectasis.  Likely secondary to thickened secretions  -Aggressive airway clearance with DuoNebs and Mucomyst ordered.  -Incentive spirometry and Aerobika 10 times an hour while awake.  -Pulmonary ordered for BiPAP twice a day to try and recruit the lung and prevent further pleural effusions.  -Consider adding further chest physiotherapy if patient has no improvement over the next 48 hours with her oxygen needs     Community Acquired Pnuemonia  COPD with possible acute exacerbation  -Patient initially presented with acute hypoxic respiratory failure, initially felt to be due to acute exacerbation of heart failure with preserved EF  -Initially she was diuresed, also received thoracentesis with improvement in oxygenation however now continues to require oxygen at 2-3 L  -Right heart cath showed normal pressures, cardiology feels that there is a significant component of COPD which is contributing to her symptoms and hypoxia  -Already completed 5 days steroid course and a round of antibiotics  -As mentioned above pulmonary following  -Most likely her hypoxia at this point is multifactorial with definite component of heart failure.  COPD is definitely contributing.  -Post repeat bilateral thoracentesis.  Continue to monitor for now.     Hypertensive urgency  New onset A-fib with RVR  Hyperlipidemia  History of coronary artery disease  Type II NSTEMI likely due to demand due to heart failure exacerbation.  -Initially found to be in A-fib, now converted to normal sinus rhythm  -Continue  apixaban, currently on 2.5 mg twice a day based on body weight and renal function  -Prior to admission atenolol changed to carvedilol due to refractory hypertension  -Continue amlodipine 10 mg daily  -Continue Plavix which she takes for both history of coronary artery disease and peripheral arterial disease  -Isosorbide mononitrate increased to 120 mg daily  -Continue Crestor     Hyponatremia  -Sodium decreased from 133 on presentation to 121   -Nephrology following  -Did require 2% saline initially, currently at 127.  -Continue 2 L fluid restriction  -Nephrology following  -Daily BMP     Acute kidney injury on CKD stage III  Suspected ATN secondary to hypotension and ARB + diuretic  Borderline hyperkalemia  -Creatinine on admission of 1.71, which worsened with diuresis and relative hypotension.  -Creatinine peaked at 3.78 and now downtrending, good urine output  -Creatinine at 2.41 on 12/4/2024.  Received a diuretic challenge with Bumex 2 mg IV once.  Creatinine improved to 2.08 but sodium worsened to 127.  -Started on torsemide 20 mg daily.  Creatinine currently stable at 2.06     GERD  -Continue with PTA famotidine     Anemia of Chronic Disease  Baseline hemoglobin has been between 8-9 over the past few months  -Hemoglobin overall stable between 9-10     Peripheral vascular disease with history of 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 2024  -Continue Plavix and statin  -Apixaban added for A-fib     History of thrombocytopenia  -Platelet count normal on admission, slightly at the moment but stable at 146.     Tobacco use  - Continue with nicotine patch  - Counselled on cessation          Clinically Significant Risk Factors      # Hyponatremia: Lowest Na = 127 mmol/L in last 2 days, will monitor as appropriate  # Hypochloremia: Lowest Cl = 91 mmol/L in last 2 days, will monitor as appropriate      # Hypoalbuminemia: Lowest albumin = 3 g/dL at 11/21/2024  4:42 AM, will monitor as appropriate    # Coagulation Defect: INR = 1.24 (Ref  range: 0.85 - 1.15) and/or PTT = 82 Seconds (Ref range: 22 - 38 Seconds), will monitor for bleeding    # Hypertension: Noted on problem list             # Moderate Malnutrition: based on nutrition assessment    # Financial/Environmental Concerns: none          DVT Prophylaxis: DOAC  Code Status: Full Code  Medically Ready for Discharge: Anticipated in 2-4 Days    Greater than 30-minute spent on documentation review, lab review, imaging review, examining the patient and discussing care with pulmonary team    Magaly Frye MD, MD  [phone](p)

## 2024-12-06 NOTE — PROGRESS NOTES
Care Management Follow Up    Length of Stay (days): 16    Expected Discharge Date: pending improvement     Concerns to be Addressed: discharge planning     Patient plan of care discussed at interdisciplinary rounds: Yes    Anticipated Discharge Disposition: Home     Anticipated Discharge Services:  TBD  Anticipated Discharge DME:  oxygen    Patient/family educated on Medicare website which has current facility and service quality ratings:  NA  Education Provided on the Discharge Plan: Yes  Patient/Family in Agreement with the Plan: yes    Referrals Placed by CM/SW:  schedule PCP follow up  Private pay costs discussed: Not applicable    Discussed  Partnership in Safe Discharge Planning  document with patient/family: No     Handoff Completed: Yes, [facility] PCP: Internal handoff referral completed    Additional Information:  Patient has not had much improvement in past week despite bilateral thora.  Pulmonary is following.  Patient is using the aerobika and IS.  Patient is not able to ambulate due to right AKA and was just starting to use her prosthesis.  Patient's goal is to discharge home when medically stable to do so.  Currently on 6L.  Patient does not have home o2.    Next Steps:   Care Management will continue to follow  PCP follow up is scheduled on 12/13, may need to reschedule    Neli Simon RN  Inpatient Care Management  [phone]

## 2024-12-06 NOTE — PROGRESS NOTES
Renal Medicine Progress Note            Assessment/Plan:     Shirley Hendricks is a 64 year old female who was admitted on 11/20/2024.      Assessment:  1) chronic kidney disease stage IIIb  Followed by [facility] nephrology, Dr. Barboza and DARREL Roberson, last visit 11/14/2024     Noted history LIMA June/July 2024 requiring dialysis, discontinued 6/24     Creatinine worsened in-house after relative hypotensive episodes after escalating blood pressure medications/diuretics/valsartan.  Likely ischemic ATN.  Encouraged to see creatinine plateaued and improving.  Stable today      2 hypoxic respiratory failure-  -multifactorial with COPD, recurrent bilateral effusion, bilateral atelectasis of lower lobe.    Normal biventricular filling pressures on RHC earlier in the admission, 11/27.  Her weight was around 100 pounds back pain.  Currently weighs at 122 pounds, on bed scale.  Clinically has no edema      2 hyponatremia-with acute renal failure.  127    Continue p.o. fluid restriction as ordered.       3) hypertension: Blood pressure reasonable.  Could do with slightly higher perfusion pressures.  Continue to hold valsartan..       Discussion-  Improved renal function.  Sodium fluctuating around 1 27-1 29.  Torsemide 20 mg twice daily with hopes to prevent reaccumulation of pleural effusion  Continue amlodipine, Imdur  Continue with p.o. fluid restriction              Interval History:     Seen/examined.    Breathing a bit better.  On nasal cannula oxygen today.  CT scan showed major atelectasis of right lower lobe.  Continued atelectasis of left lower lobe slightly improved.  Creatinine and sodium stable.            Medications and Allergies:     Current Facility-Administered Medications   Medication Dose Route Frequency Provider Last Rate Last Admin    acetylcysteine (MUCOMYST) 20 % nebulizer solution 4 mL  4 mL Nebulization 4x daily Ricardo Wheeler MD   4 mL at 12/06/24 2414    amLODIPine (NORVASC) tablet 10 mg  10  mg Oral Daily Yoli Huizar MD   10 mg at 12/06/24 0833    apixaban ANTICOAGULANT (ELIQUIS) tablet 2.5 mg  2.5 mg Oral BID Yoli Huziar MD   2.5 mg at 12/06/24 0903    carvedilol (COREG) tablet 12.5 mg  12.5 mg Oral BID w/meals Yoli Huizar MD   12.5 mg at 12/06/24 0833    clopidogrel (PLAVIX) tablet 75 mg  75 mg Oral Daily Marcin White MD   75 mg at 12/05/24 1257    fluticasone (FLONASE) 50 MCG/ACT spray 1 spray  1 spray Both Nostrils Daily Liane Huertas MD   1 spray at 12/06/24 0836    fluticasone-vilanterol (BREO ELLIPTA) 200-25 MCG/ACT inhaler 1 puff  1 puff Inhalation Daily Marcin White MD   1 puff at 12/06/24 0836    gabapentin (NEURONTIN) capsule 200 mg  200 mg Oral At Bedtime Marcin White MD   200 mg at 12/05/24 2017    ipratropium - albuterol 0.5 mg/2.5 mg/3 mL (DUONEB) neb solution 3 mL  3 mL Nebulization 4x daily Ricardo Wheeler MD   3 mL at 12/06/24 1147    isosorbide mononitrate (IMDUR) 24 hr tablet 120 mg  120 mg Oral Daily Yoli Huizar MD   120 mg at 12/06/24 0833    nicotine (NICODERM CQ) 21 MG/24HR 24 hr patch 1 patch  1 patch Transdermal Daily Carlos Hunter MD   1 patch at 12/06/24 0834    Followed by    [START ON 1/2/2025] nicotine (NICODERM CQ) 14 MG/24HR 24 hr patch 1 patch  1 patch Transdermal Daily Carlos Hunter MD        Followed by    [START ON 1/30/2025] nicotine (NICODERM CQ) 7 MG/24HR 24 hr patch 1 patch  1 patch Transdermal Daily Carlos Hunter MD        polyethylene glycol (MIRALAX) Packet 17 g  17 g Oral Daily Yoli Huizar MD   17 g at 12/03/24 0835    rosuvastatin (CRESTOR) tablet 10 mg  10 mg Oral At Bedtime Marcin White MD   10 mg at 12/05/24 2017    sodium chloride (PF) 0.9% PF flush 3 mL  3 mL Intracatheter Q8H Marcin White MD   3 mL at 12/06/24 0844    torsemide (DEMADEX) tablet 20 mg  20 mg Oral Daily Nereida Vaz MD   20 mg at 12/06/24 0833    [Held by provider]  "valsartan (DIOVAN) tablet 40 mg  40 mg Oral Daily Lisa Zeng PA-C   40 mg at 11/28/24 0828        Allergies   Allergen Reactions    Contrast Dye Anaphylaxis     Pt reported facial and throat swelling with prior CT contrast    Pantoprazole      Protonix caused diffuse edema    Chantix [Varenicline]      Terrible dreams    Gluten Meal GI Disturbance     Pt has celiac disease. Can not have gluten    Penicillins Itching and Rash            Physical Exam:   Vitals were reviewed  BP (!) 162/59 (BP Location: Right arm, Patient Position: Semi-Myers's, Cuff Size: Adult Regular)   Pulse 57   Temp 97.5  F (36.4  C) (Oral)   Resp 16   Ht 1.575 m (5' 2\")   Wt 55.8 kg (123 lb 0.3 oz)   LMP  (LMP Unknown)   SpO2 92%   BMI 22.50 kg/m      Wt Readings from Last 3 Encounters:   12/06/24 55.8 kg (123 lb 0.3 oz)   09/23/24 44.8 kg (98 lb 12.8 oz)   08/15/24 47.2 kg (104 lb)           GENERAL: Comfortable.    HEENT:  Normocephalic. No gross abnormalities  CV: RRR, 1/6 systolic murmur, no dependent edema or in left lower extremity  No edema noted  RESP: Diminished bilateral bases  GI: Abdomen soft/nt/nd, BS normal.   MUSCULOSKELETAL: Right leg amputation.  No edema  SKIN: no suspicious lesions or rashes, dry to touch  PSYCH: mood good, affect appropriate           Data:     BMP  Recent Labs   Lab 12/06/24  0756 12/06/24  0705 12/05/24  0713 12/04/24  0653 12/03/24  0655   NA  --  127* 127* 129* 127*   POTASSIUM  --  4.2 3.9 4.1 4.1   CHLORIDE  --  91* 91* 92* 89*   SONIA  --  7.9* 7.6* 7.8* 7.5*   CO2  --  23 23 23 22   BUN  --  111.8* 110.8* 123.3* 122.7*   CR  --  2.06* 2.08* 2.41* 2.67*   * 79 83 78 89     CBC  Recent Labs   Lab 12/06/24  0705 12/05/24  0713 12/04/24  1356 12/04/24  0653 12/03/24  0655   WBC 5.6 5.9  --  5.9 7.5   HGB 9.2* 8.9*  --  9.1* 9.3*   HCT 28.7* 27.3*  --  28.1* 26.7*   MCV 82 82  --  82 80   * 138* 146* 139* 145*     Lab Results   Component Value Date    AST 16 11/21/2024    ALT 15 " 11/21/2024    ALKPHOS 66 11/21/2024    BILITOTAL 0.2 11/21/2024    BILICONJ 0.0 01/23/2004     Lab Results   Component Value Date    INR 1.24 (H) 12/04/2024       Attestation:  I have reviewed today's vital signs, notes, medications, labs and imaging.    Nereida Vaz MD  [facility] - Nephrology  Office: [phone]

## 2024-12-06 NOTE — PROVIDER NOTIFICATION
MD Notification    Notified Person: MD    Notified Person Name: Dr. Nereida Vaz    Notification Date/Time: 12/6 @ 1312    Notification Interaction: matt    Purpose of Notification: [id number] Hendricks: Pt's true weight from bed scale is 50.0 kg. Thanks Mckenzie [phone]    Orders Received:     Comments:

## 2024-12-06 NOTE — PLAN OF CARE
Goal Outcome Evaluation:      Plan of Care Reviewed With: patient    Overall Patient Progress: improvingOverall Patient Progress: improving    SUMMARY: Admitted for evaluation of SOB.   DATE & TIME:12/5/24 [id number]  Cognitive Concerns/ Orientation : A&O x 4, anxious at times, otherwise calm and cooperative    BEHAVIOR & AGGRESSION TOOL COLOR: Green   CIWA SCORE: NA    ABNL VS/O2: HR in 50s, /66, 145/55, 143/62, other VSS on 6L sating 90-92%. EMMANUEL, desat to low 80% with minimal activity.   MOBILITY: R.AKA due to vascular disease per patient-wheelchair bound at baseline. Able to T&R independently and can transfer from bed to wheelchair with minimal assistance.   PAIN MANAGMENT: denies pain   DIET: Renal, 1200 mL FR. Strict I/Os.   BOWEL/BLADDER: intermittent incontinence, purewick inplace  ABNL LAB/BG: Hgb 9.1, hematocrit 28.1, Plt 146, INR 1.24, Cr 2.41, Na 139  DRAIN/DEVICES: PIV SL  TELEMETRY RHYTHM: NA   SKIN: dusky, blanchable redness to coccyx. Old scar to R. Upper thigh from previous surgeries   TESTS/PROCEDURES: s/p Right sided US thoracentesis 12/5, s/p Left US thoracentesis today, followed with chest CT scan.   D/C DATE: TBD  Discharge Barriers, creatinine and O2 need improvement.   OTHER IMPORTANT INFO: Rt thora 12/4 removed 1450 ml, and removed 1100 mL from Lt thoracentesis today. Pt reported feeling better after the procedure, dressings CDI. New order in place for BiPAP 1 hour twice daily, RT aware. Nicotine patch on Rt  arm. Nephrology and Pulmonology following.

## 2024-12-06 NOTE — PROGRESS NOTES
[facility]   Pulmonary Progress Note  Shirley Hendricks MRN: [id number]  1960  Date of Admission:11/20/2024  Date of Service: 12/06/2024  ___________________________________  Assessment & Plan  Shirley Hendricks is a 64 year old female with medical history significant for COPD, hypertension, CHF, peripheral arterial disease (on Plavix), CKD stage IV, CAD who was admitted on 11/20/2024 with 2 days of worsening shortness of breath and cough.  Despite diuresis and bilateral thoracentesis (transudative fluid) she remains hypoxic. CT chest performed 11/21 shows moderate right pleural effusion with adjacent atelectasis as well as new patchy consolidative opacities within the right middle and lower lobes, moderate left sided pleural effusion, interstitial pulmonary edema and prominent lymph nodes.  Trace pericardial effusion was also noted.      Acute hypoxic respiratory failure    COPD exacerbation    Mucus plugging    HFpEF    Pleural effusions transudative  11/20 (right) thora 1.1L  11/21 (left)   thora 1.2L   11/22 (right) thora 1.5L all transudative  CT 11/21 showed right-sided pleural effusion with atelectasis and consolidative opacities in the right middle lobes and moderate left-sided effusion.  Treated for CAP appropriately (azithromycin, ceftriaxone, cefuroxime) without evidence of ongoing leukocytosis or fever. (Ended 11/26)  RHC normal pressures (11/27) when patent was 97lbs  (was on room air)  Diuretics held since 11/27 to 12/4, patient up 20lbs, Cr improved during this time  12/04 (right) thora 1.4L  12/05 (left)   thora 1.1L  CT chest 12/5/24: b/l effusions small, RLL collapse no obstruction, LLL still atelectatic, mild pulm edema, small PTX on the left    12/06/2024  Despite bilateral Thora RLL and LLL with significant atelectasis.  There does not appear to be any obstruction.  Likely dense consolidation and potentially mucous.  This is likely causing shunt and the high oxygen  needs.  Focus of today and over the weekend will be airway clearance maneuvers along with gentle diuresis  Would restart Bumex and attempt gentle diuresis.  Out of bed and ambulate 3 times a day.  Will also trial aggressive airway clearance    -likely still fluid up, diuresis pending kidney function   -Left Thora, followed by CT scan   -try BiPAP 1 hour BID (try to recruit lung bases) (ordered)    -can do more if tolerating to help keep open airways (not for oxygenation)   -aggressive airway clearance     -DuoNebs (restarted 12/6/2024) (ordered)    -Mucomyst (started 12/6/2024) (ordered)    -IS and aerobika 10x/hr while awake (ordered)    -If not improving O2 needs over the weekend, add chest physiotherapy    Initial impression  12/4: Now requiring 5 L nasal cannula, chest x-ray showing bilateral pulmonary edema in the bases and likely recurrence of the effusions.  Would favor restarting diuretics given the patient is 20 pounds up.  Understandably this is not ideal because kidney function is just now recovering, however its likely the cause of her worsening hypoxemia.  Likely very fragile fluid status in general. Would start with diuresis for now, ok with redoing thora, would avoid doing both in the same day.  We do not have CT imaging after expansion of the lungs.  A concern would be if there is obstruction or trapped lung as leaving behind a potential space for quickly fills when patient gets fluid overloaded. Once both sides are tapped, we should get CT chest non-contrast immediately after      I have personally reviewed the daily labs, imaging studies, cultures and discussed the case with referring physician and consulting physicians.     I spent 55 dedicated to her care so far today 12/06/2024 excluding procedures, including review of medical records, review of imaging (results & images), time with patient and time in documentation.    Ricardo Wheeler MD  Pulmonary & Critical Care   [facility],  [place]  Securely message with the Vocera Web Console (learn more here)    Interval History  12/06/2024 Hosp Day:16  Nursing notes reviewed  R lyssa 12/4/24, 1400cc removed  L lyssa 12/5 1100cc removed  Breathing feels much better but she is also disappointed that her oxygenation is not better  BiPAP, tolerated ok during the day, not as much during the night  IO -32cc  Wt still [id number]  No diuretic 12/5/24   CT showing collapse of RLL and LLL  We discussed the right lower lobe and left lower lobe collapse on CT scan and how this is likely causing a shunt.  She understands that she needs to be aggressive with using her IS and Aerobika, furthermore for able to use BiPAP to keep things open when she takes naps that can be helpful as well.  Even discussed that trying to do prone positioning could even be helpful as well to help relieve some of the gravitational forces on her posterior and lower aspects of her lungs.  She also notes that she has not been coughing up as much mucus as she does even when she is at her baseline.  This is likely the cause of the mucous plugging that we see on the CT scan from 12/6     Code: Full Code   Review of Systems   ROS:  6 point ROS including Respiratory, CV, GI and , other than that noted in the HPI, is negative     Physical Exam Temp:  [97.4  F (36.3  C)-98.4  F (36.9  C)] 98.4  F (36.9  C)  Pulse:  [52-55] 55  Resp:  [16-18] 18  BP: (142-145)/(55-62) 142/60  FiO2 (%):  [50 %] 50 %  SpO2:  [90 %-94 %] 94 %  I/O last 3 completed shifts:  In: 518 [P.O.:518]  Out: 550 [Urine:550]  Wt Readings from Last 1 Encounters:   12/06/24 55.8 kg (123 lb 0.3 oz)    Body mass index is 22.5 kg/m . FiO2 (%): 50 %, Resp: 18    Exam:  General: thin female in no acute distress, on HFNC   HENT: external ears without visible abnormalities, no rhinorrhea, no epistaxis  Lungs: fair air flow b/l, no wheezes, rales, rhonchi, diminished in bases b/l  Heart: RRR, holosystolic murmu  Extremities: no  pitting edema, R BKA, no clubbing or cyanosis  Skin: no visible rashes, no mottling  Neurologic: moving all 4 extremities spontaneously, awake and alert    Data   Labs: reviewed in EMR and notable labs listed below.  Cr 2.08 (from 2.4)      Imaging: reviewed in EMR and notable imaging listed below.  CXR (12/3/24) moderate effusions, pulmonary edema     CT Chest 11/21  1.  Small to moderate right pleural effusion with adjacent  atelectasis. New patchy consolidative opacity within the right middle  lobe and lower lobe concerning for superimposed pneumonia with  possible component of reexpansion edema.  2.  Moderate left pleural effusion with complete atelectasis of the  left lower lobe.  3.  Mild interstitial pulmonary edema.    PFT 2014  Mild reduction in FEV and FVC, diffusion defect (mild), air trapping present    Medications   Current Facility-Administered Medications   Medication Dose Route Frequency Provider Last Rate Last Admin    amLODIPine (NORVASC) tablet 10 mg  10 mg Oral Daily Yoli Huizar MD   10 mg at 12/05/24 0837    apixaban ANTICOAGULANT (ELIQUIS) tablet 2.5 mg  2.5 mg Oral BID Yoli Huizar MD   2.5 mg at 12/05/24 1718    carvedilol (COREG) tablet 12.5 mg  12.5 mg Oral BID w/meals Yoli Huizar MD   12.5 mg at 12/05/24 0836    clopidogrel (PLAVIX) tablet 75 mg  75 mg Oral Daily Marcin White MD   75 mg at 12/05/24 1257    fluticasone (FLONASE) 50 MCG/ACT spray 1 spray  1 spray Both Nostrils Daily Liane Huertas MD   1 spray at 12/05/24 0831    fluticasone-vilanterol (BREO ELLIPTA) 200-25 MCG/ACT inhaler 1 puff  1 puff Inhalation Daily Marcin White MD   1 puff at 12/05/24 0831    gabapentin (NEURONTIN) capsule 200 mg  200 mg Oral At Bedtime Marcin White MD   200 mg at 12/05/24 2017    isosorbide mononitrate (IMDUR) 24 hr tablet 120 mg  120 mg Oral Daily Yoli Huizar MD   120 mg at 12/05/24 0836    nicotine (NICODERM CQ) 21 MG/24HR  24 hr patch 1 patch  1 patch Transdermal Daily Carlos Hunter MD   1 patch at 12/05/24 0834    Followed by    [START ON 1/2/2025] nicotine (NICODERM CQ) 14 MG/24HR 24 hr patch 1 patch  1 patch Transdermal Daily Carlos Hunter MD        Followed by    [START ON 1/30/2025] nicotine (NICODERM CQ) 7 MG/24HR 24 hr patch 1 patch  1 patch Transdermal Daily Carlos Hunter MD        polyethylene glycol (MIRALAX) Packet 17 g  17 g Oral Daily Yoli Huizar MD   17 g at 12/03/24 0835    rosuvastatin (CRESTOR) tablet 10 mg  10 mg Oral At Bedtime Marcin White MD   10 mg at 12/05/24 2017    sodium chloride (PF) 0.9% PF flush 3 mL  3 mL Intracatheter Q8H Marcin White MD   3 mL at 12/05/24 2018    torsemide (DEMADEX) tablet 20 mg  20 mg Oral Daily Nereida Vaz MD   20 mg at 12/05/24 1257    [Held by provider] valsartan (DIOVAN) tablet 40 mg  40 mg Oral Daily Lisa Zeng PA-C   40 mg at 11/28/24 0828

## 2024-12-07 LAB
ANION GAP SERPL CALCULATED.3IONS-SCNC: 12 MMOL/L (ref 7–15)
BUN SERPL-MCNC: 120.5 MG/DL (ref 8–23)
CALCIUM SERPL-MCNC: 7.7 MG/DL (ref 8.8–10.4)
CHLORIDE SERPL-SCNC: 93 MMOL/L (ref 98–107)
CREAT SERPL-MCNC: 1.95 MG/DL (ref 0.51–0.95)
EGFRCR SERPLBLD CKD-EPI 2021: 28 ML/MIN/1.73M2
ERYTHROCYTE [DISTWIDTH] IN BLOOD BY AUTOMATED COUNT: 14.8 % (ref 10–15)
GLUCOSE SERPL-MCNC: 82 MG/DL (ref 70–99)
HCO3 SERPL-SCNC: 24 MMOL/L (ref 22–29)
HCT VFR BLD AUTO: 24.7 % (ref 35–47)
HGB BLD-MCNC: 8 G/DL (ref 11.7–15.7)
MCH RBC QN AUTO: 26.7 PG (ref 26.5–33)
MCHC RBC AUTO-ENTMCNC: 32.4 G/DL (ref 31.5–36.5)
MCV RBC AUTO: 82 FL (ref 78–100)
PLATELET # BLD AUTO: 133 10E3/UL (ref 150–450)
POTASSIUM SERPL-SCNC: 3.9 MMOL/L (ref 3.4–5.3)
RBC # BLD AUTO: 3 10E6/UL (ref 3.8–5.2)
SODIUM SERPL-SCNC: 129 MMOL/L (ref 135–145)
WBC # BLD AUTO: 5.4 10E3/UL (ref 4–11)

## 2024-12-07 PROCEDURE — 94799 UNLISTED PULMONARY SVC/PX: CPT

## 2024-12-07 PROCEDURE — 94640 AIRWAY INHALATION TREATMENT: CPT | Mod: 76

## 2024-12-07 PROCEDURE — 250N000013 HC RX MED GY IP 250 OP 250 PS 637: Performed by: STUDENT IN AN ORGANIZED HEALTH CARE EDUCATION/TRAINING PROGRAM

## 2024-12-07 PROCEDURE — 80048 BASIC METABOLIC PNL TOTAL CA: CPT | Performed by: INTERNAL MEDICINE

## 2024-12-07 PROCEDURE — 120N000001 HC R&B MED SURG/OB

## 2024-12-07 PROCEDURE — 272N000735 HC KIT, CIRCUIT WITH NEB, VOLERA

## 2024-12-07 PROCEDURE — 999N000157 HC STATISTIC RCP TIME EA 10 MIN

## 2024-12-07 PROCEDURE — 99232 SBSQ HOSP IP/OBS MODERATE 35: CPT | Performed by: INTERNAL MEDICINE

## 2024-12-07 PROCEDURE — 36415 COLL VENOUS BLD VENIPUNCTURE: CPT | Performed by: STUDENT IN AN ORGANIZED HEALTH CARE EDUCATION/TRAINING PROGRAM

## 2024-12-07 PROCEDURE — 250N000013 HC RX MED GY IP 250 OP 250 PS 637: Performed by: INTERNAL MEDICINE

## 2024-12-07 PROCEDURE — 82374 ASSAY BLOOD CARBON DIOXIDE: CPT | Performed by: INTERNAL MEDICINE

## 2024-12-07 PROCEDURE — 94640 AIRWAY INHALATION TREATMENT: CPT

## 2024-12-07 PROCEDURE — 250N000009 HC RX 250: Performed by: STUDENT IN AN ORGANIZED HEALTH CARE EDUCATION/TRAINING PROGRAM

## 2024-12-07 PROCEDURE — 85014 HEMATOCRIT: CPT | Performed by: STUDENT IN AN ORGANIZED HEALTH CARE EDUCATION/TRAINING PROGRAM

## 2024-12-07 PROCEDURE — 85041 AUTOMATED RBC COUNT: CPT | Performed by: STUDENT IN AN ORGANIZED HEALTH CARE EDUCATION/TRAINING PROGRAM

## 2024-12-07 PROCEDURE — 99232 SBSQ HOSP IP/OBS MODERATE 35: CPT | Performed by: HOSPITALIST

## 2024-12-07 RX ADMIN — CARVEDILOL 12.5 MG: 12.5 TABLET, FILM COATED ORAL at 08:32

## 2024-12-07 RX ADMIN — TORSEMIDE 20 MG: 20 TABLET ORAL at 16:36

## 2024-12-07 RX ADMIN — FLUTICASONE FUROATE AND VILANTEROL TRIFENATATE 1 PUFF: 200; 25 POWDER RESPIRATORY (INHALATION) at 08:36

## 2024-12-07 RX ADMIN — GABAPENTIN 200 MG: 100 CAPSULE ORAL at 20:41

## 2024-12-07 RX ADMIN — IPRATROPIUM BROMIDE AND ALBUTEROL SULFATE 3 ML: .5; 3 SOLUTION RESPIRATORY (INHALATION) at 11:28

## 2024-12-07 RX ADMIN — APIXABAN 2.5 MG: 2.5 TABLET, FILM COATED ORAL at 17:44

## 2024-12-07 RX ADMIN — ACETYLCYSTEINE 4 ML: 200 SOLUTION ORAL; RESPIRATORY (INHALATION) at 15:51

## 2024-12-07 RX ADMIN — CARVEDILOL 12.5 MG: 12.5 TABLET, FILM COATED ORAL at 17:46

## 2024-12-07 RX ADMIN — IPRATROPIUM BROMIDE AND ALBUTEROL SULFATE 3 ML: .5; 3 SOLUTION RESPIRATORY (INHALATION) at 20:41

## 2024-12-07 RX ADMIN — APIXABAN 2.5 MG: 2.5 TABLET, FILM COATED ORAL at 06:32

## 2024-12-07 RX ADMIN — IPRATROPIUM BROMIDE AND ALBUTEROL SULFATE 3 ML: .5; 3 SOLUTION RESPIRATORY (INHALATION) at 15:51

## 2024-12-07 RX ADMIN — ACETYLCYSTEINE 4 ML: 200 SOLUTION ORAL; RESPIRATORY (INHALATION) at 11:28

## 2024-12-07 RX ADMIN — ACETAMINOPHEN 650 MG: 325 TABLET, FILM COATED ORAL at 20:46

## 2024-12-07 RX ADMIN — TORSEMIDE 20 MG: 20 TABLET ORAL at 08:32

## 2024-12-07 RX ADMIN — ROSUVASTATIN CALCIUM 10 MG: 10 TABLET, FILM COATED ORAL at 20:41

## 2024-12-07 RX ADMIN — AMLODIPINE BESYLATE 10 MG: 10 TABLET ORAL at 08:32

## 2024-12-07 RX ADMIN — CLOPIDOGREL BISULFATE 75 MG: 75 TABLET ORAL at 12:01

## 2024-12-07 RX ADMIN — ACETYLCYSTEINE 4 ML: 200 SOLUTION ORAL; RESPIRATORY (INHALATION) at 07:47

## 2024-12-07 RX ADMIN — NICOTINE 1 PATCH: 21 PATCH, EXTENDED RELEASE TRANSDERMAL at 08:35

## 2024-12-07 RX ADMIN — ISOSORBIDE MONONITRATE 120 MG: 60 TABLET, EXTENDED RELEASE ORAL at 08:32

## 2024-12-07 RX ADMIN — IPRATROPIUM BROMIDE AND ALBUTEROL SULFATE 3 ML: .5; 3 SOLUTION RESPIRATORY (INHALATION) at 07:47

## 2024-12-07 RX ADMIN — ACETYLCYSTEINE 4 ML: 200 SOLUTION ORAL; RESPIRATORY (INHALATION) at 20:41

## 2024-12-07 ASSESSMENT — ACTIVITIES OF DAILY LIVING (ADL)
ADLS_ACUITY_SCORE: 56
ADLS_ACUITY_SCORE: 56
ADLS_ACUITY_SCORE: 53
ADLS_ACUITY_SCORE: 56
ADLS_ACUITY_SCORE: 52
ADLS_ACUITY_SCORE: 56
ADLS_ACUITY_SCORE: 52
ADLS_ACUITY_SCORE: 53

## 2024-12-07 NOTE — PROGRESS NOTES
Renal Medicine Progress Note            Assessment/Plan:     Shirley Hendricks is a 64 year old female who was admitted on 11/20/2024.      Assessment:  1) chronic kidney disease stage IIIb  Followed by [facility] nephrology, Dr. Barboza and DARREL Roberson, last visit 11/14/2024     Noted history LIMA June/July 2024 requiring dialysis, discontinued 6/24     Creatinine worsened in-house after relative hypotensive episodes after escalating blood pressure medications/diuretics/valsartan.  Likely ischemic ATN.  GFR now improving with improving urine output as well.        2 hypoxic respiratory failure-  -multifactorial with COPD, recurrent bilateral effusion, bilateral atelectasis of lower lobe.    Normal biventricular filling pressures on RHC earlier in the admission, 11/27.  Her weight was around 100 pounds back pain.  Currently weighs at 122 pounds, on bed scale.          2 hyponatremia-with acute renal failure.  Improving trend, 129 today  Continue p.o. fluid restriction as ordered.        3) hypertension: Blood pressure reasonable.  Could do with slightly higher perfusion pressures.  Continue to hold valsartan..  On amlodipine, Imdur and now torsemide     Recs/plan:  1) continue fluid restriction  2) continue torsemide as current  Overall no new recommendations today    Sean Loving DO  [facility]  Office: [phone]  Cell: [phone]        Interval History:     Patient tired, reports poor sleep.  Reports unable to place to lay in the prone position.  Discussed her improving trend of renal function and sodium.  Torsemide 20 mg twice daily yesterday.    Appears to have good response with 1.5 L of urine output recorded so far today.         Medications and Allergies:     Current Facility-Administered Medications   Medication Dose Route Frequency Provider Last Rate Last Admin    acetylcysteine (MUCOMYST) 20 % nebulizer solution 4 mL  4 mL Nebulization 4x daily Ricardo Wheeler MD   4 mL at 12/07/24  1128    amLODIPine (NORVASC) tablet 10 mg  10 mg Oral Daily Yoli Huizar MD   10 mg at 12/07/24 0832    apixaban ANTICOAGULANT (ELIQUIS) tablet 2.5 mg  2.5 mg Oral BID Yoli Huiazr MD   2.5 mg at 12/07/24 0632    carvedilol (COREG) tablet 12.5 mg  12.5 mg Oral BID w/meals Yoli Huizar MD   12.5 mg at 12/07/24 0832    clopidogrel (PLAVIX) tablet 75 mg  75 mg Oral Daily Marcin White MD   75 mg at 12/07/24 1201    fluticasone (FLONASE) 50 MCG/ACT spray 1 spray  1 spray Both Nostrils Daily Liane Huertas MD   1 spray at 12/06/24 0836    fluticasone-vilanterol (BREO ELLIPTA) 200-25 MCG/ACT inhaler 1 puff  1 puff Inhalation Daily Marcin White MD   1 puff at 12/07/24 0836    gabapentin (NEURONTIN) capsule 200 mg  200 mg Oral At Bedtime Marcin White MD   200 mg at 12/06/24 2013    ipratropium - albuterol 0.5 mg/2.5 mg/3 mL (DUONEB) neb solution 3 mL  3 mL Nebulization 4x daily Ricardo Wheeler MD   3 mL at 12/07/24 1128    isosorbide mononitrate (IMDUR) 24 hr tablet 120 mg  120 mg Oral Daily Yoli Huizar MD   120 mg at 12/07/24 0832    nicotine (NICODERM CQ) 21 MG/24HR 24 hr patch 1 patch  1 patch Transdermal Daily Carlos Hunter MD   1 patch at 12/07/24 0835    Followed by    [START ON 1/2/2025] nicotine (NICODERM CQ) 14 MG/24HR 24 hr patch 1 patch  1 patch Transdermal Daily aCrlos Hunter MD        Followed by    [START ON 1/30/2025] nicotine (NICODERM CQ) 7 MG/24HR 24 hr patch 1 patch  1 patch Transdermal Daily Carlos Hunter MD        polyethylene glycol (MIRALAX) Packet 17 g  17 g Oral Daily Yoli Huizar MD   17 g at 12/03/24 0835    rosuvastatin (CRESTOR) tablet 10 mg  10 mg Oral At Bedtime Marcin White MD   10 mg at 12/06/24 2013    sodium chloride (PF) 0.9% PF flush 3 mL  3 mL Intracatheter Q8H Marcin White MD   3 mL at 12/06/24 2021    torsemide (DEMADEX) tablet 20 mg  20 mg Oral BID Nereida Vaz MD   20 mg  "at 12/07/24 0832    [Held by provider] valsartan (DIOVAN) tablet 40 mg  40 mg Oral Daily Lisa Zeng PA-C   40 mg at 11/28/24 0828        Allergies   Allergen Reactions    Contrast Dye Anaphylaxis     Pt reported facial and throat swelling with prior CT contrast    Pantoprazole      Protonix caused diffuse edema    Chantix [Varenicline]      Terrible dreams    Gluten Meal GI Disturbance     Pt has celiac disease. Can not have gluten    Penicillins Itching and Rash            Physical Exam:   Vitals were reviewed  BP (!) 172/62 (BP Location: Right arm)   Pulse 58   Temp 97.7  F (36.5  C) (Oral)   Resp 16   Ht 1.575 m (5' 2\")   Wt 50 kg (110 lb 3.7 oz)   LMP  (LMP Unknown)   SpO2 90%   BMI 20.16 kg/m      Wt Readings from Last 3 Encounters:   12/06/24 50 kg (110 lb 3.7 oz)   09/23/24 44.8 kg (98 lb 12.8 oz)   08/15/24 47.2 kg (104 lb)       Intake/Output Summary (Last 24 hours) at 12/7/2024 1243  Last data filed at 12/7/2024 1000  Gross per 24 hour   Intake 1080 ml   Output 1500 ml   Net -420 ml       GENERAL: Comfortable.    HEENT:  Normocephalic. No gross abnormalities  CV: RRR, 1/6 systolic murmur, no dependent edema or in left lower extremity  No edema noted  RESP: Diminished bilateral bases  GI: Abdomen soft/nt/nd, BS normal.   MUSCULOSKELETAL: Right leg amputation.  No edema  SKIN: no suspicious lesions or rashes, dry to touch  PSYCH: mood good, affect appropriate              Data:     BMP  Recent Labs   Lab 12/07/24  0546 12/06/24  1347 12/06/24  0756 12/06/24  0705 12/05/24  0713 12/04/24  0653   *  --   --  127* 127* 129*   POTASSIUM 3.9  --   --  4.2 3.9 4.1   CHLORIDE 93*  --   --  91* 91* 92*   SONIA 7.7*  --   --  7.9* 7.6* 7.8*   CO2 24  --   --  23 23 23   .5*  --   --  111.8* 110.8* 123.3*   CR 1.95*  --   --  2.06* 2.08* 2.41*   GLC 82 89 140* 79 83 78     CBC  Recent Labs   Lab 12/07/24  0546 12/06/24  0705 12/05/24  0713 12/04/24  1356 12/04/24  0653   WBC 5.4 5.6 5.9  --  " 5.9   HGB 8.0* 9.2* 8.9*  --  9.1*   HCT 24.7* 28.7* 27.3*  --  28.1*   MCV 82 82 82  --  82   * 143* 138* 146* 139*     Lab Results   Component Value Date    AST 16 11/21/2024    ALT 15 11/21/2024    ALKPHOS 66 11/21/2024    BILITOTAL 0.2 11/21/2024    BILICONJ 0.0 01/23/2004     Lab Results   Component Value Date    INR 1.24 (H) 12/04/2024       Attestation:  I have reviewed today's vital signs, notes, medications, labs and imaging.    DO [facility] - Nephrology  Office: [phone]  Cell: [phone]

## 2024-12-07 NOTE — PROGRESS NOTES
SUMMARY: Admitted for evaluation of SOB.     Orientation: A/O x4    Vitals/Tele: 6LPM NC, bradycardic at times  slightly elevated B/P.    IV Access/drains: PIV SL    Diet: Renal- 1200ml FR    Mobility: R AKA, T/Repo, wheelchair and reposition self in bed .    GI/: Incontinent of B/B, purewick in place    Wound/Skin: Old AKA scars, blanchable redness to coccyx w mepilex    Consults: Pulmonology/Nephrology    Discharge Plan: Home pending improvement/needs to wean O2       See Flow sheets for assessment

## 2024-12-07 NOTE — PROGRESS NOTES
[facility]    Hospitalist Progress Note    Interval History   Patient awake and alert.  Continues to remain dyspneic and hypoxic needing 6 L nasal cannula.  Regularly using BiPAP twice a day.  He is bringing up more sputum with chest physiotherapy.    -Data reviewed today: I reviewed all new labs and imaging results over the last 24 hours. I personally reviewed the chest CT image(s) showing small bilateral pleural effusions.  Tiny left basilar pneumothorax related to recent thoracentesis.  Near complete atelectasis of the right lower lobe has worsened and dense atelectasis in the left lower lobe has improved since 11/21/2024.  Previously groundglass opacities in the right middle lobe have resolved.  Mild pulmonary edema. .    Physical Exam   Temp: 97.8  F (36.6  C) Temp src: Oral BP: (!) 155/72 Pulse: 60   Resp: 17 SpO2: 92 % O2 Device: Nasal cannula with humidification Oxygen Delivery: 6 LPM  Vitals:    12/05/24 0654 12/06/24 0500 12/06/24 1300   Weight: 55.5 kg (122 lb 5.7 oz) 55.8 kg (123 lb 0.3 oz) 50 kg (110 lb 3.7 oz)     Vital Signs with Ranges  Temp:  [97.6  F (36.4  C)-98.3  F (36.8  C)] 97.8  F (36.6  C)  Pulse:  [50-60] 60  Resp:  [16-17] 17  BP: (151-172)/(62-72) 155/72  SpO2:  [90 %-95 %] 92 %  I/O last 3 completed shifts:  In: 960 [P.O.:960]  Out: 900 [Urine:900]    Physical Exam  Constitutional:       Appearance: She is ill-appearing.   Cardiovascular:      Rate and Rhythm: Normal rate and regular rhythm.      Pulses: Normal pulses.      Heart sounds: Normal heart sounds.   Pulmonary:      Effort: Respiratory distress present.      Breath sounds: Normal breath sounds.      Comments: Bilateral coarse breath sounds  Abdominal:      General: Abdomen is flat. Bowel sounds are normal. There is no distension.      Tenderness: There is no abdominal tenderness. There is no guarding.   Musculoskeletal:      Comments: Right above-knee amputation   Skin:     General: Skin is warm and  dry.   Neurological:      General: No focal deficit present.           Medications   Current Facility-Administered Medications   Medication Dose Route Frequency Provider Last Rate Last Admin    No lozenges or gum should be given while patient on BIPAP/AVAPS/AVAPS AE   Does not apply Continuous PRN Ricardo Wheeler MD        Patient may continue current oral medications   Does not apply Continuous PRN Ricardo Wheeler MD         Current Facility-Administered Medications   Medication Dose Route Frequency Provider Last Rate Last Admin    acetylcysteine (MUCOMYST) 20 % nebulizer solution 4 mL  4 mL Nebulization 4x daily Ricardo Wheeler MD   4 mL at 12/07/24 1128    amLODIPine (NORVASC) tablet 10 mg  10 mg Oral Daily Yoli Huizar MD   10 mg at 12/07/24 0832    apixaban ANTICOAGULANT (ELIQUIS) tablet 2.5 mg  2.5 mg Oral BID Yoli Huizar MD   2.5 mg at 12/07/24 0632    carvedilol (COREG) tablet 12.5 mg  12.5 mg Oral BID w/meals Yoli Huizar MD   12.5 mg at 12/07/24 0832    clopidogrel (PLAVIX) tablet 75 mg  75 mg Oral Daily Marcin White MD   75 mg at 12/07/24 1201    fluticasone (FLONASE) 50 MCG/ACT spray 1 spray  1 spray Both Nostrils Daily Liane Huertas MD   1 spray at 12/06/24 0836    fluticasone-vilanterol (BREO ELLIPTA) 200-25 MCG/ACT inhaler 1 puff  1 puff Inhalation Daily Marcin White MD   1 puff at 12/07/24 0836    gabapentin (NEURONTIN) capsule 200 mg  200 mg Oral At Bedtime Marcin White MD   200 mg at 12/06/24 2013    ipratropium - albuterol 0.5 mg/2.5 mg/3 mL (DUONEB) neb solution 3 mL  3 mL Nebulization 4x daily Ricardo Wheeler MD   3 mL at 12/07/24 1128    isosorbide mononitrate (IMDUR) 24 hr tablet 120 mg  120 mg Oral Daily Yoli Huizar MD   120 mg at 12/07/24 0832    nicotine (NICODERM CQ) 21 MG/24HR 24 hr patch 1 patch  1 patch Transdermal Daily Carlos Hunter MD   1 patch at 12/07/24 0835    Followed by     [START ON 1/2/2025] nicotine (NICODERM CQ) 14 MG/24HR 24 hr patch 1 patch  1 patch Transdermal Daily Carlos Hunter MD        Followed by    [START ON 1/30/2025] nicotine (NICODERM CQ) 7 MG/24HR 24 hr patch 1 patch  1 patch Transdermal Daily Carlos Hunter MD        polyethylene glycol (MIRALAX) Packet 17 g  17 g Oral Daily Yoli Huizar MD   17 g at 12/03/24 0835    rosuvastatin (CRESTOR) tablet 10 mg  10 mg Oral At Bedtime Marcin White MD   10 mg at 12/06/24 2013    sodium chloride (PF) 0.9% PF flush 3 mL  3 mL Intracatheter Q8H Marcin White MD   3 mL at 12/06/24 2021    torsemide (DEMADEX) tablet 20 mg  20 mg Oral BID Nereida Vaz MD   20 mg at 12/07/24 0832    [Held by provider] valsartan (DIOVAN) tablet 40 mg  40 mg Oral Daily Lisa Zeng PA-C   40 mg at 11/28/24 0828       Data   Recent Labs   Lab 12/07/24  0546 12/06/24  1347 12/06/24  0756 12/06/24  0705 12/05/24  0713 12/04/24  1356   WBC 5.4  --   --  5.6 5.9  --    HGB 8.0*  --   --  9.2* 8.9*  --    MCV 82  --   --  82 82  --    *  --   --  143* 138* 146*   INR  --   --   --   --   --  1.24*   *  --   --  127* 127*  --    POTASSIUM 3.9  --   --  4.2 3.9  --    CHLORIDE 93*  --   --  91* 91*  --    CO2 24  --   --  23 23  --    .5*  --   --  111.8* 110.8*  --    CR 1.95*  --   --  2.06* 2.08*  --    ANIONGAP 12  --   --  13 13  --    SONIA 7.7*  --   --  7.9* 7.6*  --    GLC 82 89 140* 79 83  --        No results found for this or any previous visit (from the past 24 hours).        Assessment & Plan     Shirley Hendricks is a 64 year old female, on Plavix, with a history of COPD, hypertension, CHF, peripheral artery disease, NSTEMI, stage 4 CKD, and tobacco use who is being admitted for evaluation of shortness of breath.      Acute hypoxic respiratory failure-multifactorial.  Acute exacerbation of heart failure with preserved EF.  Moderate to severe left pleural effusion status post  thoracentesis.  -Presents with 2-day history of increasing shortness of breath and a cough.  On admission chest x-ray shows a large left pleural effusion with compressive atelectasis.  There is also diffuse interstitial opacities consistent with pulmonary edema.    -Labs pertinent for a proBNP of 37482   -Patient underwent bilateral thoracentesis initially on 11/20 and the following day with good improvement in her symptoms.  -Fluid consistent with transudate effusion. Cultures negative. Cytology negative for malignancy  -Completed 5 days of corticosteroid  -TTE done during this hospitalization on 11/22/2024 showed LVEF normal at 55 to 60%.  RV function normal.  Mild 1+ MR, 1+ TR.  Mild pulmonary hypertension.  Compared to prior study there is no significant change  -Completed treatment for community-acquired pneumonia with cefuroxime and azithromycin  -Right heart catheterization done on 11/27/24 showed normal pressures, however this was after several  doses of diuretics.  Weight at that time was close to 104 pounds.  -Patient's oxygenation initially improved with aggressive diuretics and thoracentesis, however i patient started getting more hypoxic again on 12/3/2024 to a point of needing up to 5 L nasal cannula.  -Repeat chest x-ray performed on 12/3/2024 shows persistent pulmonary edema with ongoing recurrent bilateral pleural effusion.  Weight has gone up to 122 pounds on 12/5/2024  -Discussed with pulmonary and nephrology .  Received one-time dose of Bumex 2 mg on 12/4/2024  -Underwent repeat right-sided thoracentesis with removal of 1.4 L on 12/4/2024 and left-sided thoracentesis on 12/5/2024 with removal of 1.1 L fluid.  -CT chest without contrast was obtained right after left-sided thoracentesis on 12/5/2024 that continued to show significant atelectasis of the right and left lung bases.  There is concern for trapped lung causing recurrent pleural fluid reaccumulation into the empty space.  CT chest also  showed a tiny pneumothorax.  Repeat chest x-ray on 12/6/2024 continues to show bibasilar atelectasis.  Likely secondary to thickened mucus  -Aggressive airway clearance with DuoNebs and Mucomyst ordered.  -Incentive spirometry and Aerobika 10 times an hour while awake.  -Pulmonary ordered for BiPAP twice a day to try and recruit the lung and prevent further pleural effusions.  -No significant improvement in the oxygen needs over the last 24 hours.  Will add chest physiotherapy today.  Will repeat chest CT on Monday to evaluate atelectasis.  If there is no improvement pulmonary will reassess.     Community Acquired Pnuemonia  COPD with possible acute exacerbation  -Patient initially presented with acute hypoxic respiratory failure, initially felt to be due to acute exacerbation of heart failure with preserved EF  -Initially she was diuresed, also received thoracentesis with improvement in oxygenation however now continues to require oxygen at 2-3 L  -Right heart cath showed normal pressures, cardiology feels that there is a significant component of COPD which is contributing to her symptoms and hypoxia  -Already completed 5 days steroid course and a round of antibiotics  -As mentioned above pulmonary following  -Most likely her hypoxia at this point is multifactorial with definite component of heart failure.  COPD is definitely contributing.  -Post repeat bilateral thoracentesis.  Continue to monitor for now.     Hypertensive urgency  New onset A-fib with RVR  Hyperlipidemia  History of coronary artery disease  Type II NSTEMI likely due to demand due to heart failure exacerbation.  -Initially found to be in A-fib, now converted to normal sinus rhythm  -Continue apixaban, currently on 2.5 mg twice a day based on body weight and renal function  -Prior to admission atenolol changed to carvedilol due to refractory hypertension  -Continue amlodipine 10 mg daily  -Continue Plavix which she takes for both history of  coronary artery disease and peripheral arterial disease  -Isosorbide mononitrate increased to 120 mg daily  -Continue Crestor     Hyponatremia  -Sodium decreased from 133 on presentation to 121   -Nephrology following  -Did require 2% saline initially, currently at 129.  -Continue 2 L fluid restriction  -Nephrology following  -Daily BMP     Acute kidney injury on CKD stage III  Suspected ATN secondary to hypotension and ARB + diuretic  Borderline hyperkalemia  -Creatinine on admission of 1.71, which worsened with diuresis and relative hypotension.  -Creatinine peaked at 3.78 and now downtrending, good urine output  -Creatinine at 2.41 on 12/4/2024.  Received a diuretic challenge with Bumex 2 mg IV once.  And started on torsemide 20 mg daily which was eventually uptitrated to twice daily.  -Creatinine slowly improving 2.94--141--2.08--2.06-1.95.  BUN has been uptrending.  Nephrology following along.  Appreciate recommendations.     GERD  -Continue with PTA famotidine     Anemia of Chronic Disease  Baseline hemoglobin has been between 8-9 over the past few months  -Hemoglobin overall stable between 9-10     Peripheral vascular disease with history of 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 2024  -Continue Plavix and statin  -Apixaban added for A-fib     History of thrombocytopenia  -Platelet count normal on admission, slightly at the moment but stable at 146.     Tobacco use  - Continue with nicotine patch  - Counselled on cessation          Clinically Significant Risk Factors      # Hyponatremia: Lowest Na = 127 mmol/L in last 2 days, will monitor as appropriate  # Hypochloremia: Lowest Cl = 91 mmol/L in last 2 days, will monitor as appropriate      # Hypoalbuminemia: Lowest albumin = 3 g/dL at 11/21/2024  4:42 AM, will monitor as appropriate       # Hypertension: Noted on problem list             # Moderate Malnutrition: based on nutrition assessment    # Financial/Environmental Concerns: none          DVT Prophylaxis: DOAC  Code  Status: Full Code  Medically Ready for Discharge: Anticipated in 2-4 Days    Greater than 35-minute spent on documentation review, lab review, imaging review, examining the patient and discussing care with pulmonary team    Magaly Frye MD, MD  [phone](p)

## 2024-12-07 NOTE — PROGRESS NOTES
Initiated CPT therapy with the patient, who tolerated only 6mins at minimal settings. The patient reported shortness of breath and experienced a desaturation episode. Therapy was stopped, and all nebs were given as ordered.  Will cont to follow.

## 2024-12-07 NOTE — PLAN OF CARE
Goal Outcome Evaluation:       SUMMARY: Admitted for evaluation of SOB.   DATE & TIME:12/07/24 -[id number]  Cognitive Concerns/ Orientation : A&O x 4, calm/cooperative, intermitting, short of breath, chest discomforts, but denies abdominal pain, chills , fever, headache, and nausea.  BEHAVIOR & AGGRESSION TOOL COLOR: Green and positive   CIWA SCORE: NA    ABNL VS/O2: HR normal  on 60  elevate B/P, 155/72   on 6L sating 90-93%. EMMANUEL, desat to low 80s with minimal activity,Nicotine patch on Rt  arm. Nephrology and Pulmonology following  MOBILITY: R.AKA due to vascular disease per patient-wheelchair bound at baseline, able to turn/repo independently and can transfer from bed to wheelchair with assist of x1   PAIN MANAGMENT: denies  general discomfort and pain   DIET: Renal, FR 1200 ml ,Strict I&O   BOWEL/BLADDER: intermittent incontinence, purewick in place, no Bowel movement in shift   ABNL LAB/BG: Hgb 8.0, hematocrit 24.7, Plt 133,  INR 1.24, Cr 2.41, Na 129,   DRAIN/DEVICES: PIV SL  Rt thora 12/4 removed 1450 mlintact dry and clean   TELEMETRY RHYTHM: NA   SKIN: dusky, blanchable redness to coccyx. Old scar to R. Upper thigh from previous surgeries   TESTS/PROCEDURES: s/p Right sided US thoracentesis   D/C DATE: Pending

## 2024-12-08 ENCOUNTER — APPOINTMENT (OUTPATIENT)
Dept: ULTRASOUND IMAGING | Facility: CLINIC | Age: 64
DRG: 280 | End: 2024-12-08
Attending: HOSPITALIST
Payer: COMMERCIAL

## 2024-12-08 ENCOUNTER — APPOINTMENT (OUTPATIENT)
Dept: CT IMAGING | Facility: CLINIC | Age: 64
DRG: 280 | End: 2024-12-08
Attending: HOSPITALIST
Payer: COMMERCIAL

## 2024-12-08 LAB
ANION GAP SERPL CALCULATED.3IONS-SCNC: 13 MMOL/L (ref 7–15)
BUN SERPL-MCNC: 114.3 MG/DL (ref 8–23)
CALCIUM SERPL-MCNC: 8 MG/DL (ref 8.8–10.4)
CHLORIDE SERPL-SCNC: 93 MMOL/L (ref 98–107)
CREAT SERPL-MCNC: 1.76 MG/DL (ref 0.51–0.95)
EGFRCR SERPLBLD CKD-EPI 2021: 31 ML/MIN/1.73M2
ERYTHROCYTE [DISTWIDTH] IN BLOOD BY AUTOMATED COUNT: 14.7 % (ref 10–15)
GLUCOSE SERPL-MCNC: 81 MG/DL (ref 70–99)
HCO3 SERPL-SCNC: 25 MMOL/L (ref 22–29)
HCT VFR BLD AUTO: 25.2 % (ref 35–47)
HGB BLD-MCNC: 8.1 G/DL (ref 11.7–15.7)
MCH RBC QN AUTO: 26.6 PG (ref 26.5–33)
MCHC RBC AUTO-ENTMCNC: 32.1 G/DL (ref 31.5–36.5)
MCV RBC AUTO: 83 FL (ref 78–100)
PLATELET # BLD AUTO: 140 10E3/UL (ref 150–450)
POTASSIUM SERPL-SCNC: 4 MMOL/L (ref 3.4–5.3)
RBC # BLD AUTO: 3.05 10E6/UL (ref 3.8–5.2)
SODIUM SERPL-SCNC: 131 MMOL/L (ref 135–145)
WBC # BLD AUTO: 5.1 10E3/UL (ref 4–11)

## 2024-12-08 PROCEDURE — 250N000009 HC RX 250: Performed by: STUDENT IN AN ORGANIZED HEALTH CARE EDUCATION/TRAINING PROGRAM

## 2024-12-08 PROCEDURE — 250N000013 HC RX MED GY IP 250 OP 250 PS 637: Performed by: STUDENT IN AN ORGANIZED HEALTH CARE EDUCATION/TRAINING PROGRAM

## 2024-12-08 PROCEDURE — 71250 CT THORAX DX C-: CPT

## 2024-12-08 PROCEDURE — 80048 BASIC METABOLIC PNL TOTAL CA: CPT | Performed by: INTERNAL MEDICINE

## 2024-12-08 PROCEDURE — 99233 SBSQ HOSP IP/OBS HIGH 50: CPT | Performed by: HOSPITALIST

## 2024-12-08 PROCEDURE — 84132 ASSAY OF SERUM POTASSIUM: CPT | Performed by: INTERNAL MEDICINE

## 2024-12-08 PROCEDURE — 0W993ZZ DRAINAGE OF RIGHT PLEURAL CAVITY, PERCUTANEOUS APPROACH: ICD-10-PCS | Performed by: STUDENT IN AN ORGANIZED HEALTH CARE EDUCATION/TRAINING PROGRAM

## 2024-12-08 PROCEDURE — 250N000013 HC RX MED GY IP 250 OP 250 PS 637: Performed by: INTERNAL MEDICINE

## 2024-12-08 PROCEDURE — 99232 SBSQ HOSP IP/OBS MODERATE 35: CPT | Performed by: INTERNAL MEDICINE

## 2024-12-08 PROCEDURE — 272N000706 US THORACENTESIS

## 2024-12-08 PROCEDURE — 32555 ASPIRATE PLEURA W/ IMAGING: CPT

## 2024-12-08 PROCEDURE — 85014 HEMATOCRIT: CPT | Performed by: STUDENT IN AN ORGANIZED HEALTH CARE EDUCATION/TRAINING PROGRAM

## 2024-12-08 PROCEDURE — 36415 COLL VENOUS BLD VENIPUNCTURE: CPT | Performed by: STUDENT IN AN ORGANIZED HEALTH CARE EDUCATION/TRAINING PROGRAM

## 2024-12-08 PROCEDURE — 120N000001 HC R&B MED SURG/OB

## 2024-12-08 RX ORDER — LIDOCAINE HYDROCHLORIDE 10 MG/ML
10 INJECTION, SOLUTION EPIDURAL; INFILTRATION; INTRACAUDAL; PERINEURAL ONCE
Status: COMPLETED | OUTPATIENT
Start: 2024-12-08 | End: 2024-12-08

## 2024-12-08 RX ADMIN — AMLODIPINE BESYLATE 10 MG: 10 TABLET ORAL at 08:51

## 2024-12-08 RX ADMIN — CALCIUM CARBONATE 1000 MG: 500 TABLET, CHEWABLE ORAL at 10:04

## 2024-12-08 RX ADMIN — ACETYLCYSTEINE 4 ML: 200 SOLUTION ORAL; RESPIRATORY (INHALATION) at 07:46

## 2024-12-08 RX ADMIN — IPRATROPIUM BROMIDE AND ALBUTEROL SULFATE 3 ML: .5; 3 SOLUTION RESPIRATORY (INHALATION) at 07:46

## 2024-12-08 RX ADMIN — TORSEMIDE 20 MG: 20 TABLET ORAL at 18:14

## 2024-12-08 RX ADMIN — ACETAMINOPHEN 650 MG: 325 TABLET, FILM COATED ORAL at 20:54

## 2024-12-08 RX ADMIN — ISOSORBIDE MONONITRATE 120 MG: 60 TABLET, EXTENDED RELEASE ORAL at 08:51

## 2024-12-08 RX ADMIN — APIXABAN 2.5 MG: 2.5 TABLET, FILM COATED ORAL at 18:14

## 2024-12-08 RX ADMIN — CARVEDILOL 12.5 MG: 12.5 TABLET, FILM COATED ORAL at 18:13

## 2024-12-08 RX ADMIN — CLOPIDOGREL BISULFATE 75 MG: 75 TABLET ORAL at 13:05

## 2024-12-08 RX ADMIN — CARVEDILOL 12.5 MG: 12.5 TABLET, FILM COATED ORAL at 08:51

## 2024-12-08 RX ADMIN — TORSEMIDE 20 MG: 20 TABLET ORAL at 08:51

## 2024-12-08 RX ADMIN — POLYETHYLENE GLYCOL 3350 17 G: 17 POWDER, FOR SOLUTION ORAL at 08:51

## 2024-12-08 RX ADMIN — APIXABAN 2.5 MG: 2.5 TABLET, FILM COATED ORAL at 06:06

## 2024-12-08 RX ADMIN — ROSUVASTATIN CALCIUM 10 MG: 10 TABLET, FILM COATED ORAL at 20:54

## 2024-12-08 RX ADMIN — NICOTINE 1 PATCH: 21 PATCH, EXTENDED RELEASE TRANSDERMAL at 08:52

## 2024-12-08 RX ADMIN — LIDOCAINE HYDROCHLORIDE 10 ML: 10 INJECTION, SOLUTION EPIDURAL; INFILTRATION; INTRACAUDAL; PERINEURAL at 11:44

## 2024-12-08 RX ADMIN — GABAPENTIN 200 MG: 100 CAPSULE ORAL at 20:53

## 2024-12-08 ASSESSMENT — ACTIVITIES OF DAILY LIVING (ADL)
ADLS_ACUITY_SCORE: 49
ADLS_ACUITY_SCORE: 51
ADLS_ACUITY_SCORE: 50
ADLS_ACUITY_SCORE: 53
ADLS_ACUITY_SCORE: 50
ADLS_ACUITY_SCORE: 49
ADLS_ACUITY_SCORE: 53
ADLS_ACUITY_SCORE: 49
ADLS_ACUITY_SCORE: 53
ADLS_ACUITY_SCORE: 50
ADLS_ACUITY_SCORE: 49
ADLS_ACUITY_SCORE: 50
ADLS_ACUITY_SCORE: 53
ADLS_ACUITY_SCORE: 51
ADLS_ACUITY_SCORE: 53
ADLS_ACUITY_SCORE: 50
ADLS_ACUITY_SCORE: 49
ADLS_ACUITY_SCORE: 49
ADLS_ACUITY_SCORE: 50

## 2024-12-08 NOTE — PROGRESS NOTES
Renal Medicine Progress Note            Assessment/Plan:     Shirley Hendricks is a 64 year old female who was admitted on 11/20/2024.      Assessment:  1) chronic kidney disease stage IIIb  Followed by [facility] nephrology, Dr. Barboza and DARREL Roberson, last visit 11/14/2024     Noted history LIMA June/July 2024 requiring dialysis, discontinued 6/24     Creatinine worsened in-house after relative hypotensive episodes after escalating blood pressure medications/diuretics/valsartan.  Likely ischemic ATN.  GFR now improving with improving urine output as well.  Responsive to torsemide.  Unclear events so far today of dyspnea.  Getting a chest CT for further evaluation.        2 hypoxic respiratory failure-  -multifactorial with COPD, recurrent bilateral effusion, bilateral atelectasis of lower lobe.    Normal biventricular filling pressures on RHC earlier in the admission, 11/27.  Her weight was around 100 pounds back pain.  Currently weighs at 122 pounds, on bed scale.          3) hyponatremia-with acute renal failure.  Improving trend continues, 131 today.  Continue p.o. fluid restriction as ordered.     4) hypertension: Blood pressure reasonable.  Could do with slightly higher perfusion pressures.  Continue to hold valsartan..  On amlodipine, Imdur and now torsemide     Recs/plan:  1) continue fluid restriction  2) continue torsemide as current.  With improving parameters, if worsened pulm edema as cause of dyspnea, can consider further increasing diuretics.    Overall again no new recommendations today    Sean Loving DO  [facility]  Office: [phone]  Cell: [phone]        Interval History:     Patient with some sudden onset shortness of breath overnight.  Discussed improving other laboratory values but she was disappointed she is not feeling better.    2.3 L urine output yesterday, significant likely net negative I's and O's.    Remains hypertensive, 150 and 160 systolic blood pressures.           Medications and Allergies:     Current Facility-Administered Medications   Medication Dose Route Frequency Provider Last Rate Last Admin    acetylcysteine (MUCOMYST) 20 % nebulizer solution 4 mL  4 mL Nebulization 4x daily Ricardo Wheeler MD   4 mL at 12/08/24 0746    amLODIPine (NORVASC) tablet 10 mg  10 mg Oral Daily Yoli Huizar MD   10 mg at 12/08/24 0851    apixaban ANTICOAGULANT (ELIQUIS) tablet 2.5 mg  2.5 mg Oral BID Yoli Huizar MD   2.5 mg at 12/08/24 0606    carvedilol (COREG) tablet 12.5 mg  12.5 mg Oral BID w/meals Yoli Huizar MD   12.5 mg at 12/08/24 0851    clopidogrel (PLAVIX) tablet 75 mg  75 mg Oral Daily Marcin White MD   75 mg at 12/07/24 1201    fluticasone (FLONASE) 50 MCG/ACT spray 1 spray  1 spray Both Nostrils Daily Liane Huertas MD   1 spray at 12/06/24 0836    fluticasone-vilanterol (BREO ELLIPTA) 200-25 MCG/ACT inhaler 1 puff  1 puff Inhalation Daily Marcin White MD   1 puff at 12/07/24 0836    gabapentin (NEURONTIN) capsule 200 mg  200 mg Oral At Bedtime Marcin White MD   200 mg at 12/07/24 2041    ipratropium - albuterol 0.5 mg/2.5 mg/3 mL (DUONEB) neb solution 3 mL  3 mL Nebulization 4x daily Ricardo Wheeler MD   3 mL at 12/08/24 0746    isosorbide mononitrate (IMDUR) 24 hr tablet 120 mg  120 mg Oral Daily Yoli Huizar MD   120 mg at 12/08/24 0851    lidocaine (PF) (XYLOCAINE) 1 % injection 10 mL  10 mL Subcutaneous Once Jabari Gilmore MD        nicotine (NICODERM CQ) 21 MG/24HR 24 hr patch 1 patch  1 patch Transdermal Daily Carlos Hunter MD   1 patch at 12/08/24 0852    Followed by    [START ON 1/2/2025] nicotine (NICODERM CQ) 14 MG/24HR 24 hr patch 1 patch  1 patch Transdermal Daily Carlos Hunter MD        Followed by    [START ON 1/30/2025] nicotine (NICODERM CQ) 7 MG/24HR 24 hr patch 1 patch  1 patch Transdermal Daily Carlos Hunter MD        polyethylene glycol (MIRALAX) Packet  "17 g  17 g Oral Daily Yoli Huizar MD   17 g at 12/08/24 0851    rosuvastatin (CRESTOR) tablet 10 mg  10 mg Oral At Bedtime Marcin White MD   10 mg at 12/07/24 2041    sodium chloride (PF) 0.9% PF flush 3 mL  3 mL Intracatheter Q8H Marcin White MD   3 mL at 12/07/24 2046    torsemide (DEMADEX) tablet 20 mg  20 mg Oral BID Nereida Vaz MD   20 mg at 12/08/24 0851    [Held by provider] valsartan (DIOVAN) tablet 40 mg  40 mg Oral Daily Lisa Zeng PA-C   40 mg at 11/28/24 0828        Allergies   Allergen Reactions    Contrast Dye Anaphylaxis     Pt reported facial and throat swelling with prior CT contrast    Pantoprazole      Protonix caused diffuse edema    Chantix [Varenicline]      Terrible dreams    Gluten Meal GI Disturbance     Pt has celiac disease. Can not have gluten    Penicillins Itching and Rash            Physical Exam:   Vitals were reviewed  BP (!) 169/62 (BP Location: Right arm)   Pulse 59   Temp 97.5  F (36.4  C) (Oral)   Resp 20   Ht 1.575 m (5' 2\")   Wt 50 kg (110 lb 3.7 oz)   LMP  (LMP Unknown)   SpO2 90%   BMI 20.16 kg/m      Wt Readings from Last 3 Encounters:   12/06/24 50 kg (110 lb 3.7 oz)   09/23/24 44.8 kg (98 lb 12.8 oz)   08/15/24 47.2 kg (104 lb)       Intake/Output Summary (Last 24 hours) at 12/8/2024 1139  Last data filed at 12/8/2024 0900  Gross per 24 hour   Intake 900 ml   Output 1100 ml   Net -200 ml       GENERAL: Comfortable.    HEENT:  Normocephalic. No gross abnormalities  CV: RRR, 1/6 systolic murmur, has 1+ edema in the left foot today  No edema noted  RESP: Diminished bilateral bases  GI: Abdomen soft/nt/nd, BS normal.   MUSCULOSKELETAL: Right leg amputation.  No edema  SKIN: no suspicious lesions or rashes, dry to touch  PSYCH: mood good, affect appropriate              Data:     BMP  Recent Labs   Lab 12/08/24  0548 12/07/24  0546 12/06/24  1347 12/06/24  0756 12/06/24  0705 12/05/24  0713   * 129*  --   --  127* 127*   POTASSIUM 4.0 3.9  " --   --  4.2 3.9   CHLORIDE 93* 93*  --   --  91* 91*   SONIA 8.0* 7.7*  --   --  7.9* 7.6*   CO2 25 24  --   --  23 23   .3* 120.5*  --   --  111.8* 110.8*   CR 1.76* 1.95*  --   --  2.06* 2.08*   GLC 81 82 89 140* 79 83     CBC  Recent Labs   Lab 12/08/24  0548 12/07/24  0546 12/06/24  0705 12/05/24  0713   WBC 5.1 5.4 5.6 5.9   HGB 8.1* 8.0* 9.2* 8.9*   HCT 25.2* 24.7* 28.7* 27.3*   MCV 83 82 82 82   * 133* 143* 138*     Lab Results   Component Value Date    AST 16 11/21/2024    ALT 15 11/21/2024    ALKPHOS 66 11/21/2024    BILITOTAL 0.2 11/21/2024    BILICONJ 0.0 01/23/2004     Lab Results   Component Value Date    INR 1.24 (H) 12/04/2024       Attestation:  I have reviewed today's vital signs, notes, medications, labs and imaging.    DO [facility]  Office: [phone]  Cell: [phone]

## 2024-12-08 NOTE — PLAN OF CARE
Goal Outcome Evaluation:                      SUMMARY: Admitted for evaluation of SOB.     DATE & TIME:12/08/24   Cognitive Concerns/ Orientation : A&O x4, pleasant, calm & coop this shift. Anxious re: breathing status at times.   BEHAVIOR & AGGRESSION TOOL COLOR: Green   ABNL VS/O2: VSS on 10L via oxymizer. O2 sats goal >88%. Desats with minimal activity such as speaking.   MOBILITY: A x1 transfer to , R AKA.   PAIN MANAGMENT: Denies.  DIET: Renal, FR 1200 ml, Strict I&O   BOWEL/BLADDER: Intermittent incontinence, purewick in place. No BM.   ABNL LAB/BG: Cr 1.76, Na 131  DRAIN/DEVICES: B PIV SL   SKIN: Dusky, blanchable redness to coccyx, mepilex on.    TESTS/PROCEDURES: s/p Right sided US thoracentesis 12/4, s/p Left US thoracentesis 12/5. CT chest and R lower thora 12/8, 1.5L removed.  D/C DATE: TBD  Discharge Barriers: creatinine and O2 need improvement.  OTHER IMPORTANT INFO: More comfortable with following thoracentesis. Refused 11am nebs. Nephrology and Pulmonology following.

## 2024-12-08 NOTE — PLAN OF CARE
"SUMMARY: Admitted for evaluation of SOB.   DATE & TIME:12/07/24: [id number]  Cognitive Concerns/ Orientation : A&O x 4, calm/cooperative, intermittent SOB.   BEHAVIOR & AGGRESSION TOOL COLOR: Green   ABNL VS/O2: VSS on 6L NC humidified oxygen. Desats to low 80s with minimal activity, HR bradycardic. Nicotine patch on R  arm.   MOBILITY: R AKA due to vascular disease per patient-wheelchair bound at baseline, able to turn/repo independently most of the time and can transfer from bed to wheelchair with assist of x1   PAIN MANAGMENT: c/o headache- tylenol given at HS  DIET: Renal, FR 1200 ml, Strict I&O   BOWEL/BLADDER: intermittent incontinence, purewick in place, no Bowel movement in shift   ABNL LAB/BG: Hgb 8.0, Cr 1.95, Na 129  DRAIN/DEVICES: PIV SL   TELEMETRY RHYTHM: NA   SKIN: dusky, blanchable redness to coccyx.   TESTS/PROCEDURES: s/p Right sided US thoracentesis 12/4, s/p Left US thoracentesis 12/5, followed with chest CT scan  D/C DATE: TBD  Discharge Barriers: creatinine and O2 need improvement  OTHER IMPORTANT INFO: Pt had one treatment of chest physiotherapy. Pt did not tolerate this well, she was very anxious during and after, and said it felt like her \"heart was going to explode\". Refused chest physiotherapy at HS. MD paged, and need to develop alternative plan tomorrow. Nephrology and Pulmonology following.   "

## 2024-12-08 NOTE — PLAN OF CARE
DATE & TIME:12/07/24 [id number]  Cognitive Concerns/ Orientation : A&O x4. Calm & cooperative. Appears anxious at times due to O2 needs. Pt expressed wanting to talk to someone from psych because she feels like her anxiety is getting worse and that she hasn't been grieving all of the loss she has experienced for the past two years; pt is open to trying anxiety medication  BEHAVIOR & AGGRESSION TOOL COLOR: Green   ABNL VS/O2: VSS on 6L NC humidified oxygen, switched to Oxymask this AM. O2 sats goal >88%. Desats to low 80s with minimal activity or conversation   MOBILITY: A x1 transfer to , R AKA  PAIN MANAGMENT: Denies  DIET: Renal, FR 1200 ml, Strict I&O   BOWEL/BLADDER: intermittent incontinence, purewick in place, no Bowel movement in shift   ABNL LAB/BG: Hgb 8.0, Cr 1.95, Na 129  DRAIN/DEVICES: PIV SL   SKIN: dusky, blanchable redness to coccyx.   TESTS/PROCEDURES: s/p Right sided US thoracentesis 12/4, s/p Left US thoracentesis 12/5   D/C DATE: TBD  Discharge Barriers: creatinine and O2 need improvement  OTHER IMPORTANT INFO: Pt no longer wanting chest physiotherapy due to discomfort. Nephrology and Pulmonology following.

## 2024-12-09 ENCOUNTER — APPOINTMENT (OUTPATIENT)
Dept: ULTRASOUND IMAGING | Facility: CLINIC | Age: 64
DRG: 280 | End: 2024-12-09
Attending: HOSPITALIST
Payer: COMMERCIAL

## 2024-12-09 LAB
ANION GAP SERPL CALCULATED.3IONS-SCNC: 9 MMOL/L (ref 7–15)
BUN SERPL-MCNC: 101.7 MG/DL (ref 8–23)
CALCIUM SERPL-MCNC: 8.1 MG/DL (ref 8.8–10.4)
CHLORIDE SERPL-SCNC: 95 MMOL/L (ref 98–107)
CREAT SERPL-MCNC: 1.54 MG/DL (ref 0.51–0.95)
EGFRCR SERPLBLD CKD-EPI 2021: 37 ML/MIN/1.73M2
ERYTHROCYTE [DISTWIDTH] IN BLOOD BY AUTOMATED COUNT: 14.8 % (ref 10–15)
GLUCOSE SERPL-MCNC: 79 MG/DL (ref 70–99)
HCO3 SERPL-SCNC: 29 MMOL/L (ref 22–29)
HCT VFR BLD AUTO: 24.7 % (ref 35–47)
HGB BLD-MCNC: 7.8 G/DL (ref 11.7–15.7)
MCH RBC QN AUTO: 26.4 PG (ref 26.5–33)
MCHC RBC AUTO-ENTMCNC: 31.6 G/DL (ref 31.5–36.5)
MCV RBC AUTO: 83 FL (ref 78–100)
NT-PROBNP SERPL-MCNC: 7569 PG/ML (ref 0–900)
PLATELET # BLD AUTO: 141 10E3/UL (ref 150–450)
POTASSIUM SERPL-SCNC: 4 MMOL/L (ref 3.4–5.3)
RBC # BLD AUTO: 2.96 10E6/UL (ref 3.8–5.2)
SODIUM SERPL-SCNC: 133 MMOL/L (ref 135–145)
WBC # BLD AUTO: 3.6 10E3/UL (ref 4–11)

## 2024-12-09 PROCEDURE — 36415 COLL VENOUS BLD VENIPUNCTURE: CPT | Performed by: INTERNAL MEDICINE

## 2024-12-09 PROCEDURE — 250N000013 HC RX MED GY IP 250 OP 250 PS 637: Performed by: STUDENT IN AN ORGANIZED HEALTH CARE EDUCATION/TRAINING PROGRAM

## 2024-12-09 PROCEDURE — 250N000013 HC RX MED GY IP 250 OP 250 PS 637: Performed by: INTERNAL MEDICINE

## 2024-12-09 PROCEDURE — 0W9B3ZZ DRAINAGE OF LEFT PLEURAL CAVITY, PERCUTANEOUS APPROACH: ICD-10-PCS | Performed by: RADIOLOGY

## 2024-12-09 PROCEDURE — 94640 AIRWAY INHALATION TREATMENT: CPT

## 2024-12-09 PROCEDURE — 32555 ASPIRATE PLEURA W/ IMAGING: CPT

## 2024-12-09 PROCEDURE — 83880 ASSAY OF NATRIURETIC PEPTIDE: CPT | Performed by: HOSPITALIST

## 2024-12-09 PROCEDURE — 272N000706 US THORACENTESIS

## 2024-12-09 PROCEDURE — 80048 BASIC METABOLIC PNL TOTAL CA: CPT | Performed by: INTERNAL MEDICINE

## 2024-12-09 PROCEDURE — 250N000009 HC RX 250: Performed by: RADIOLOGY

## 2024-12-09 PROCEDURE — 99233 SBSQ HOSP IP/OBS HIGH 50: CPT | Performed by: STUDENT IN AN ORGANIZED HEALTH CARE EDUCATION/TRAINING PROGRAM

## 2024-12-09 PROCEDURE — 99233 SBSQ HOSP IP/OBS HIGH 50: CPT | Performed by: HOSPITALIST

## 2024-12-09 PROCEDURE — 120N000001 HC R&B MED SURG/OB

## 2024-12-09 PROCEDURE — 250N000009 HC RX 250: Performed by: STUDENT IN AN ORGANIZED HEALTH CARE EDUCATION/TRAINING PROGRAM

## 2024-12-09 PROCEDURE — 999N000157 HC STATISTIC RCP TIME EA 10 MIN

## 2024-12-09 PROCEDURE — 94640 AIRWAY INHALATION TREATMENT: CPT | Mod: 76

## 2024-12-09 PROCEDURE — 85027 COMPLETE CBC AUTOMATED: CPT | Performed by: STUDENT IN AN ORGANIZED HEALTH CARE EDUCATION/TRAINING PROGRAM

## 2024-12-09 PROCEDURE — 99222 1ST HOSP IP/OBS MODERATE 55: CPT | Performed by: PSYCHIATRY & NEUROLOGY

## 2024-12-09 RX ORDER — LIDOCAINE HYDROCHLORIDE 10 MG/ML
10 INJECTION, SOLUTION EPIDURAL; INFILTRATION; INTRACAUDAL; PERINEURAL ONCE
Status: COMPLETED | OUTPATIENT
Start: 2024-12-09 | End: 2024-12-09

## 2024-12-09 RX ORDER — GABAPENTIN 100 MG/1
100 CAPSULE ORAL 2 TIMES DAILY PRN
Status: DISCONTINUED | OUTPATIENT
Start: 2024-12-09 | End: 2024-12-13 | Stop reason: HOSPADM

## 2024-12-09 RX ADMIN — CARVEDILOL 12.5 MG: 12.5 TABLET, FILM COATED ORAL at 17:19

## 2024-12-09 RX ADMIN — IPRATROPIUM BROMIDE AND ALBUTEROL SULFATE 3 ML: .5; 3 SOLUTION RESPIRATORY (INHALATION) at 19:10

## 2024-12-09 RX ADMIN — CARVEDILOL 12.5 MG: 12.5 TABLET, FILM COATED ORAL at 08:20

## 2024-12-09 RX ADMIN — ACETYLCYSTEINE 4 ML: 200 SOLUTION ORAL; RESPIRATORY (INHALATION) at 19:10

## 2024-12-09 RX ADMIN — APIXABAN 2.5 MG: 2.5 TABLET, FILM COATED ORAL at 17:19

## 2024-12-09 RX ADMIN — LIDOCAINE HYDROCHLORIDE 10 ML: 10 INJECTION, SOLUTION EPIDURAL; INFILTRATION; INTRACAUDAL; PERINEURAL at 11:12

## 2024-12-09 RX ADMIN — ROSUVASTATIN CALCIUM 10 MG: 10 TABLET, FILM COATED ORAL at 21:16

## 2024-12-09 RX ADMIN — TORSEMIDE 20 MG: 20 TABLET ORAL at 17:19

## 2024-12-09 RX ADMIN — FLUTICASONE FUROATE AND VILANTEROL TRIFENATATE 1 PUFF: 200; 25 POWDER RESPIRATORY (INHALATION) at 08:27

## 2024-12-09 RX ADMIN — GABAPENTIN 200 MG: 100 CAPSULE ORAL at 21:16

## 2024-12-09 RX ADMIN — ACETAMINOPHEN 1000 MG: 500 TABLET, FILM COATED ORAL at 21:17

## 2024-12-09 RX ADMIN — TORSEMIDE 20 MG: 20 TABLET ORAL at 08:20

## 2024-12-09 RX ADMIN — ACETYLCYSTEINE 4 ML: 200 SOLUTION ORAL; RESPIRATORY (INHALATION) at 12:25

## 2024-12-09 RX ADMIN — ISOSORBIDE MONONITRATE 120 MG: 60 TABLET, EXTENDED RELEASE ORAL at 08:20

## 2024-12-09 RX ADMIN — IPRATROPIUM BROMIDE AND ALBUTEROL SULFATE 3 ML: .5; 3 SOLUTION RESPIRATORY (INHALATION) at 12:22

## 2024-12-09 RX ADMIN — AMLODIPINE BESYLATE 10 MG: 10 TABLET ORAL at 08:20

## 2024-12-09 RX ADMIN — CLOPIDOGREL BISULFATE 75 MG: 75 TABLET ORAL at 12:52

## 2024-12-09 RX ADMIN — NICOTINE 1 PATCH: 21 PATCH, EXTENDED RELEASE TRANSDERMAL at 08:22

## 2024-12-09 RX ADMIN — APIXABAN 2.5 MG: 2.5 TABLET, FILM COATED ORAL at 06:25

## 2024-12-09 ASSESSMENT — ACTIVITIES OF DAILY LIVING (ADL)
ADLS_ACUITY_SCORE: 52
ADLS_ACUITY_SCORE: 52
ADLS_ACUITY_SCORE: 50
ADLS_ACUITY_SCORE: 53
ADLS_ACUITY_SCORE: 52
ADLS_ACUITY_SCORE: 50
ADLS_ACUITY_SCORE: 53
ADLS_ACUITY_SCORE: 52
ADLS_ACUITY_SCORE: 50
ADLS_ACUITY_SCORE: 52
ADLS_ACUITY_SCORE: 50
ADLS_ACUITY_SCORE: 52
ADLS_ACUITY_SCORE: 50
ADLS_ACUITY_SCORE: 52
ADLS_ACUITY_SCORE: 52
ADLS_ACUITY_SCORE: 50
ADLS_ACUITY_SCORE: 50
ADLS_ACUITY_SCORE: 52
ADLS_ACUITY_SCORE: 50
ADLS_ACUITY_SCORE: 52
ADLS_ACUITY_SCORE: 50

## 2024-12-09 NOTE — PROGRESS NOTES
Chart reviewed.   Cr improved to baseline  Good UOP   Sodium improving.     Nephrology will sign off. Plz call back with qsts.   Followed by [facility] nephrology, Dr. Barboza and DARREL Roberson. Follow up in 2 wks    Neerida Vaz MD  [facility]   [phone]

## 2024-12-09 NOTE — PLAN OF CARE
Goal Outcome Evaluation:    DATE & TIME:12/08/24 8666-2731  Cognitive Concerns/ Orientation : A&Ox4  BEHAVIOR & AGGRESSION TOOL COLOR: Green   ABNL VS/O2: VSS on 6L via NC O2 sats, goal >88%.   MOBILITY: Ax1 transfer to , R AKA.   PAIN MANAGMENT: Denies.  DIET: Renal, FR 1200 ml, Strict I&O   BOWEL/BLADDER: Intermittent incontinence, purewick for the overnight.   ABNL LAB/BG: Cr 1.76, Na 131  DRAIN/DEVICES: PIV SL   SKIN: Dusky, blanchable redness to coccyx, mepilex on.    TESTS/PROCEDURES:CT chest and R lower thora 12/8 (dayshift), 1.5L removed. Pt is hopeful they will do another thoracentesis 12/9 on L side  D/C DATE: TBD  Discharge Barriers: creatinine and O2 need improvement.  OTHER IMPORTANT INFO: More comfortable with following thoracentesis. Refused all nebs, refusing BIPAP and chest physiotherapy. Nephrology and Pulmonology following.

## 2024-12-09 NOTE — PLAN OF CARE
SUMMARY: Admitted for evaluation of SOB.     DATE & TIME:12/08/24 6437-7679  Cognitive Concerns/ Orientation : A&O x4, pleasant, calm & coop this shift.   BEHAVIOR & AGGRESSION TOOL COLOR: Green   ABNL VS/O2: VSS on 6L via NC O2 sats goal >88%.   MOBILITY: A x1 transfer to , R AKA.   PAIN MANAGMENT: Denies.  DIET: Renal, FR 1200 ml, Strict I&O   BOWEL/BLADDER: Intermittent incontinence, purewick in place. BM this shift in [facility]  ABNL LAB/BG: Cr 1.76, Na 131  DRAIN/DEVICES: PIV SL   SKIN: Dusky, blanchable redness to coccyx, mepilex on.    TESTS/PROCEDURES:CT chest and R lower thora 12/8 (dayshift), 1.5L removed. Pt is hopeful they will do another thoracentesis tomorrow  D/C DATE: TBD  Discharge Barriers: creatinine and O2 need improvement.  OTHER IMPORTANT INFO: More comfortable with following thoracentesis. Refused all nebs. Nephrology and Pulmonology following.

## 2024-12-09 NOTE — PROGRESS NOTES
[facility]    Hospitalist Progress Note    Interval History   Patient awake and alert.  Underwent right thoracentesis with removal of 1.5 L on 12/8/2024 and is feeling significantly better today.  Currently down to 4 L nasal cannula.  Did not do any of her nebulizer or chest physiotherapy yesterday.  Patient is quite upset and annoyed that she is constantly being asked to do chest physiotherapy and nebulizers.    -Data reviewed today: I reviewed all new labs and imaging results over the last 24 hours. I personally reviewed the chest CT image(s) showing small bilateral pleural effusions.  Tiny left basilar pneumothorax related to recent thoracentesis.  Near complete atelectasis of the right lower lobe has worsened and dense atelectasis in the left lower lobe has improved since 11/21/2024.  Previously groundglass opacities in the right middle lobe have resolved.  Mild pulmonary edema. .    Physical Exam   Temp: 97.6  F (36.4  C) Temp src: Oral BP: (!) 152/55 Pulse: 50   Resp: 16 SpO2: 93 % O2 Device: Nasal cannula Oxygen Delivery: 4 LPM  Vitals:    12/06/24 1300 12/09/24 0543 12/09/24 0629   Weight: 50 kg (110 lb 3.7 oz) 54.6 kg (120 lb 5.9 oz) 50 kg (110 lb 3.7 oz)     Vital Signs with Ranges  Temp:  [97.6  F (36.4  C)-98  F (36.7  C)] 97.6  F (36.4  C)  Pulse:  [50-58] 50  Resp:  [16] 16  BP: (147-158)/(55-61) 152/55  SpO2:  [88 %-96 %] 93 %  I/O last 3 completed shifts:  In: 240 [P.O.:240]  Out: 1400 [Urine:1400]    Physical Exam  Constitutional:       Appearance: She is ill-appearing.   Cardiovascular:      Rate and Rhythm: Normal rate and regular rhythm.      Pulses: Normal pulses.      Heart sounds: Normal heart sounds.   Pulmonary:      Effort: Respiratory distress present.      Breath sounds: Normal breath sounds.      Comments: Bilateral coarse breath sounds.  Reduced breath sounds throughout the right side and on the left base very tachypneic  Abdominal:      General: Abdomen is  flat. Bowel sounds are normal. There is no distension.      Tenderness: There is no abdominal tenderness. There is no guarding.   Musculoskeletal:      Comments: Right above-knee amputation   Skin:     General: Skin is warm and dry.   Neurological:      General: No focal deficit present.           Medications   Current Facility-Administered Medications   Medication Dose Route Frequency Provider Last Rate Last Admin    No lozenges or gum should be given while patient on BIPAP/AVAPS/AVAPS AE   Does not apply Continuous PRN Ricardo Wheeler MD        Patient may continue current oral medications   Does not apply Continuous PRN Ricardo Wheeler MD         Current Facility-Administered Medications   Medication Dose Route Frequency Provider Last Rate Last Admin    acetylcysteine (MUCOMYST) 20 % nebulizer solution 4 mL  4 mL Nebulization 4x daily Ricardo Wheeler MD   4 mL at 12/09/24 1225    amLODIPine (NORVASC) tablet 10 mg  10 mg Oral Daily Yoli Huizar MD   10 mg at 12/09/24 0820    apixaban ANTICOAGULANT (ELIQUIS) tablet 2.5 mg  2.5 mg Oral BID Yoli Huizar MD   2.5 mg at 12/09/24 0625    carvedilol (COREG) tablet 12.5 mg  12.5 mg Oral BID w/meals Yoli Huizar MD   12.5 mg at 12/09/24 0820    clopidogrel (PLAVIX) tablet 75 mg  75 mg Oral Daily Marcin White MD   75 mg at 12/09/24 1252    fluticasone (FLONASE) 50 MCG/ACT spray 1 spray  1 spray Both Nostrils Daily Liane Huertas MD   1 spray at 12/06/24 0836    fluticasone-vilanterol (BREO ELLIPTA) 200-25 MCG/ACT inhaler 1 puff  1 puff Inhalation Daily Marcin White MD   1 puff at 12/09/24 0827    gabapentin (NEURONTIN) capsule 200 mg  200 mg Oral At Bedtime Marcin White MD   200 mg at 12/08/24 2053    ipratropium - albuterol 0.5 mg/2.5 mg/3 mL (DUONEB) neb solution 3 mL  3 mL Nebulization 4x daily Ricardo Wheeler MD   3 mL at 12/09/24 1222    isosorbide mononitrate (IMDUR) 24 hr tablet 120 mg   120 mg Oral Daily Yoli Huizar MD   120 mg at 12/09/24 0820    nicotine (NICODERM CQ) 21 MG/24HR 24 hr patch 1 patch  1 patch Transdermal Daily Carlos Hunter MD   1 patch at 12/09/24 0822    Followed by    [START ON 1/2/2025] nicotine (NICODERM CQ) 14 MG/24HR 24 hr patch 1 patch  1 patch Transdermal Daily Carlos Hunter MD        Followed by    [START ON 1/30/2025] nicotine (NICODERM CQ) 7 MG/24HR 24 hr patch 1 patch  1 patch Transdermal Daily Carlos Hunter MD        polyethylene glycol (MIRALAX) Packet 17 g  17 g Oral Daily Yoli Huizar MD   17 g at 12/08/24 0851    rosuvastatin (CRESTOR) tablet 10 mg  10 mg Oral At Bedtime Marcin White MD   10 mg at 12/08/24 2054    sodium chloride (PF) 0.9% PF flush 3 mL  3 mL Intracatheter Q8H Marcin White MD   3 mL at 12/09/24 0821    torsemide (DEMADEX) tablet 20 mg  20 mg Oral BID Nereida Vaz MD   20 mg at 12/09/24 0820    [Held by provider] valsartan (DIOVAN) tablet 40 mg  40 mg Oral Daily Lisa Zeng PA-C   40 mg at 11/28/24 0828       Data   Recent Labs   Lab 12/09/24  0714 12/08/24  0548 12/07/24  0546 12/05/24  0713 12/04/24  1356   WBC 3.6* 5.1 5.4   < >  --    HGB 7.8* 8.1* 8.0*   < >  --    MCV 83 83 82   < >  --    * 140* 133*   < > 146*   INR  --   --   --   --  1.24*   * 131* 129*   < >  --    POTASSIUM 4.0 4.0 3.9   < >  --    CHLORIDE 95* 93* 93*   < >  --    CO2 29 25 24   < >  --    .7* 114.3* 120.5*   < >  --    CR 1.54* 1.76* 1.95*   < >  --    ANIONGAP 9 13 12   < >  --    SONIA 8.1* 8.0* 7.7*   < >  --    GLC 79 81 82   < >  --     < > = values in this interval not displayed.       Recent Results (from the past 24 hours)   US Thoracentesis    Narrative    EXAM:  1. LEFT THORACENTESIS  2. ULTRASOUND GUIDANCE  LOCATION: [facility]  DATE/TIME: 12/9/2024 11:25 AM    INDICATION: Pleural effusion.    PROCEDURE: Informed consent obtained. Time out performed. The chest  was prepped and  draped in a sterile fashion. 10 mL of 1% lidocaine was  infused into local soft tissues. A 5 Cymraes catheter system was  introduced into the pleural effusion under ultrasound guidance.    1.1 liters of clear fluid were removed and sent to lab if requested.    Patient tolerated procedure well.    Ultrasound images have been permanently captured for documentation.      Impression    IMPRESSION:  Status post ultrasound-guided left thoracentesis.    RAGINI DEVINE MD         SYSTEM ID:  [id number]           Assessment & Plan     Shirley Hendricks is a 64 year old female, on Plavix, with a history of COPD, hypertension, CHF, peripheral artery disease, NSTEMI, stage 4 CKD, and tobacco use who is being admitted for evaluation of shortness of breath.      Acute hypoxic respiratory failure-multifactorial.  Acute exacerbation of heart failure with preserved EF.  Moderate to severe left pleural effusion status post thoracentesis.  -Presents with 2-day history of increasing shortness of breath and a cough.  On admission chest x-ray shows a large left pleural effusion with compressive atelectasis.  There is also diffuse interstitial opacities consistent with pulmonary edema.    -Labs pertinent for a proBNP of 20294   -Patient underwent bilateral thoracentesis initially on 11/20 and the following day with good improvement in her symptoms.  -Fluid consistent with transudate effusion. Cultures negative. Cytology negative for malignancy  -Completed 5 days of corticosteroid  -TTE done during this hospitalization on 11/22/2024 showed LVEF normal at 55 to 60%.  RV function normal.  Mild 1+ MR, 1+ TR.  Mild pulmonary hypertension.  Compared to prior study there is no significant change  -Completed treatment for community-acquired pneumonia with cefuroxime and azithromycin  -Right heart catheterization done on 11/27/24 showed normal pressures, however this was after several  doses of diuretics.  Weight at that time was close to 104  pounds.  -Patient's oxygenation initially improved with aggressive diuretics and thoracentesis, however i patient started getting more hypoxic again on 12/3/2024 to a point of needing up to 5 L nasal cannula.  -Repeat chest x-ray performed on 12/3/2024 shows persistent pulmonary edema with ongoing recurrent bilateral pleural effusion.  Weight has gone up to 122 pounds on 12/5/2024  -Discussed with pulmonary and nephrology .  Received one-time dose of Bumex 2 mg on 12/4/2024  -Underwent repeat right-sided thoracentesis with removal of 1.4 L on 12/4/2024 and left-sided thoracentesis on 12/5/2024 with removal of 1.1 L fluid.  -CT chest without contrast was obtained right after left-sided thoracentesis on 12/5/2024 that continued to show significant atelectasis of the right and left lung bases.  There is concern for trapped lung causing recurrent pleural fluid reaccumulation into the empty space.  CT chest also showed a tiny pneumothorax.  Repeat chest x-ray on 12/6/2024 continues to show bibasilar atelectasis.  Likely secondary to thickened mucus  -Aggressive airway clearance with DuoNebs and Mucomyst ordered.  -Incentive spirometry and Aerobika 10 times an hour while awake.  -Pulmonary ordered for BiPAP twice a day to try and recruit the lung and prevent further pleural effusions.  -Tried chest physiotherapy but did not tolerate well.  Will discontinue for now.  -12/8-significant worsening hypoxia with increasing oxygen needs up to 10 L on oxy mask repeated chest CT that shows recurrent large bilateral pleural effusions with right greater than left.  Repeat ultrasound right thoracentesis with removal of 1500 cc.  Patient felt significantly better after this.  Left-sided thoracentesis being done on 12/9/2024.   - Unfortunately this has been a recurrent problem despite aggressive mucus clearance mechanisms and intermittent BiPAP.  -Discussed with pulmonary team today.  They did mention possibility of tunneled pleural  catheter for her recurrent pleural effusions and felt that this would be an ongoing problem in the setting of bilateral basal collapse.  I will defer to pulmonary team to coordinate this and to discuss with the patient.     Community Acquired Pnuemonia  COPD with possible acute exacerbation  -Patient initially presented with acute hypoxic respiratory failure, initially felt to be due to acute exacerbation of heart failure with preserved EF  -Initially she was diuresed, also received thoracentesis with improvement in oxygenation however now continues to require oxygen at 2-3 L  -Right heart cath showed normal pressures, cardiology feels that there is a significant component of COPD which is contributing to her symptoms and hypoxia  -Already completed 5 days steroid course and a round of antibiotics  -As mentioned above pulmonary following  -Most likely her hypoxia at this point is multifactorial with definite component of heart failure.  COPD is definitely contributing.  -Post repeat bilateral thoracentesis.  Continue to monitor for now.     Hypertensive urgency  New onset A-fib with RVR  Hyperlipidemia  History of coronary artery disease  Type II NSTEMI likely due to demand due to heart failure exacerbation.  -Initially found to be in A-fib, now converted to normal sinus rhythm  -Continue apixaban, currently on 2.5 mg twice a day based on body weight and renal function  -Prior to admission atenolol changed to carvedilol due to refractory hypertension  -Continue amlodipine 10 mg daily  -Continue Plavix which she takes for both history of coronary artery disease and peripheral arterial disease  -Isosorbide mononitrate increased to 120 mg daily  -Continue Crestor     Hyponatremia  -Sodium decreased from 133 on presentation to 121   -Nephrology following  -Did require 2% saline initially, currently at 133  -Continue 2 L fluid restriction  -Nephrology following  -Daily BMP next     Acute kidney injury on CKD stage  III  Suspected ATN secondary to hypotension and ARB + diuretic  Borderline hyperkalemia  -Creatinine on admission of 1.71, which worsened with diuresis and relative hypotension.  -Creatinine peaked at 3.78 and now downtrending, good urine output  -Creatinine at 2.41 on 12/4/2024.  Received a diuretic challenge with Bumex 2 mg IV once.  And started on torsemide 20 mg daily which was eventually uptitrated to twice daily.  -Creatinine slowly improving 2.94--141--2.08--2.06-1.95--1.76--1.54.  BUN improving as well.  Nephrology signed off on 12/9/2024.     GERD  -Continue with PTA famotidine     Anemia of Chronic Disease  Baseline hemoglobin has been between 8-9 over the past few months  -Hemoglobin overall stable between 9-10     Peripheral vascular disease with history of 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 2024  -Continue Plavix and statin  -Apixaban added for A-fib     History of thrombocytopenia  -Platelet count normal on admission, slightly at the moment but stable at 146.     Tobacco use  - Continue with nicotine patch  - Counselled on cessation          Clinically Significant Risk Factors      # Hyponatremia: Lowest Na = 131 mmol/L in last 2 days, will monitor as appropriate  # Hypochloremia: Lowest Cl = 93 mmol/L in last 2 days, will monitor as appropriate      # Hypoalbuminemia: Lowest albumin = 3 g/dL at 11/21/2024  4:42 AM, will monitor as appropriate       # Hypertension: Noted on problem list             # Moderate Malnutrition: based on nutrition assessment    # Financial/Environmental Concerns: none          DVT Prophylaxis: DOAC  Code Status: Full Code  Medically Ready for Discharge: Anticipated in 2-4 Days    Greater than 50 minutes spent on documentation review, lab review, imaging review, discussing care with the patient at bedside and with pulmonary team.\    Patient can discharge once her respiratory status stabilizes.  Pulmonary team will have to help with a more definitive treatment of her recurrent bilateral  pleural effusions including arranging for tunneled pleural catheter placement if needed    Magaly Frye MD, MD  [phone](p)

## 2024-12-09 NOTE — PLAN OF CARE
Goal Outcome Evaluation:       SUMMARY: Admitted for evaluation of SOB.     DATE & TIME:12/09/24 6017-0846  Cognitive Concerns/ Orientation : A&Ox4  BEHAVIOR & AGGRESSION TOOL COLOR: Green   ABNL VS/O2: VSS on 3L via NC O2 sats, goal >88%.   MOBILITY: Ax1 transfer to , R AKA. Declined to get OOB this shift.  PAIN MANAGMENT: Denies.  DIET: Renal, FR 1200 ml, Strict I&O -pure wick in place  BOWEL/BLADDER: Intermittent incontinence, pure wick    ABNL LAB/BG: Cr 1.54, , CA 8.1, BNP 7569  DRAIN/DEVICES: PIV SL   SKIN: Dusky, blanchable redness to coccyx, mepilex on.  Repositions self mostly during day.  TESTS/PROCEDURES:CT chest and R lower thora 12/8 (dayshift), 1.5L removed. Left thoracentesis completed today removed 1.1L-- dressings CDI  D/C DATE: TBD pending improvement   Discharge Barriers: creatinine and O2 need improvement.  OTHER IMPORTANT INFO: More comfortable with following thoracentesis. On scheduled nebs, refusing BIPAP and chest physiotherapy. Nephrology signed off and Pulmonology following.
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
## 2024-12-09 NOTE — PROGRESS NOTES
"[facility] Pulmonary Consult Note    Date of Service: 12/09/24    Assessment and Recommendations:  64F COPD, HTN, CHF, PAD, CKDIV, CAD admitted 11/20 w/ worsening SOB and cough. She has acute hypoxemic respiratory failure 2/2 COPD, HFpEF, and transudative pleural effusions. These effusions are seemingly related to HFpEF and CKD. Given continued re-accumulation of these effusions, I feel she likely has trapped vs entrapped lung and her lung will not fully re-expand leading to continued pleural fluid collection.     - continue airway clearance  - continue ICS-LABA (Breo)  - continue scheduled duonebs  - diuresis per primary/cardiology  - pt may benefit from tunneled pleural catheter placement, this would be done by IR at [facility], unsure if it would be done as an inpatient, may need referral to the  for interventional pulmonary to consider TPC placement    Chief Complaint   Patient presents with    Breathing Problem       Summary:  4L. Feels better post-thoracentesis.     10 point review of systems negative, aside from that mentioned above    BP (!) 152/55 (BP Location: Left arm)   Pulse 50   Temp 97.6  F (36.4  C) (Oral)   Resp 16   Ht 1.575 m (5' 2\")   Wt 50 kg (110 lb 3.7 oz)   LMP  (LMP Unknown)   SpO2 94%   BMI 20.16 kg/m    Gen: NAD  HEENT: anicteric, OP clear  Card: RRR  Pulm: decreaed b/l bases  Abd: soft, NTND  MSK: no acute joint abnormalities  Skin: no obvious rash  Psych: normal affect  Neuro: answering questions appropriately     Labs: personally reviewed    Imaging: personally reviewed    Past Medical History:   Diagnosis Date    Anxiety 12/07/2017    Bilateral carpal tunnel syndrome     Charcot-Breonna-Tooth disease     COPD (chronic obstructive pulmonary disease) (H)     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 Simons dermatology    Embolism and thrombosis of unspecified artery (H) 08/2000    Protein C,S, Leiden FV, Lupus Inhibitor Negative "    Gastroesophageal reflux disease     Hyperlipidaemia     Hypertension     Lupus     skin    Mild major depression (H) 11/07/2017    Myocardial infarction (H)     x3    Osteoarthrosis, unspecified whether generalized or localized, unspecified site     PAD (peripheral artery disease) (H)     Peripheral vascular disease, unspecified (H) 12/2000    s/p angioplasty with stent right femoral a.; Right iliac and femoral a. clot    Post-menopausal     Reflux esophagitis 02/2004    EGD: esophagitis and medium HH    SBO (small bowel obstruction) (H) 08/10/2021    SVT (supraventricular tachycardia) (H)     Thrombocytopenia (H)     Uncomplicated asthma     Vitamin C deficiency 08/12/2018       Past Surgical History:   Procedure Laterality Date    AMPUTATE LEG ABOVE KNEE N/A 4/1/2024    Procedure: AMPUTATION, ABOVE KNEE right leg with wound vac dressing, excision of anteriotibial bypass graft, closure of (previous interventional radiology) left groin incision;  Surgeon: José Luis Hernandez MD;  Location:  OR    AMPUTATE LEG ABOVE KNEE Right 4/9/2024    Procedure: COMPLETION RIGHT ABOVE KNEE AMPUTATION;  Surgeon: José Luis Hernandez MD;  Location:  OR    ANGIOGRAM      ANGIOGRAM Right 6/23/2021    Procedure: RIGHT LOWER EXTREMITY ANGIOGRAM WITH LEFT BRACHIAL ARTERY CUTDOWN;  Surgeon: José Luis Hernandez MD;  Location:  OR    APPLY WOUND VAC Right 5/16/2024    Procedure: PLACEMENT OF WOUND VAC TO RIGHT ABOVE KNEE AMPUTATION;  Surgeon: Tay Enciso MD;  Location:  OR    APPLY WOUND VAC N/A 5/20/2024    Procedure: AND PLACEMENT OF WOUND VAC;  Surgeon: José Luis Hernandez MD;  Location:  OR    BIOPSY MASS NECK Right 10/11/2023    Procedure: Right Parotid Biopsy;  Surgeon: Randal Mendoza MD;  Location:  OR    BRONCHOSCOPY FLEXIBLE AND RIGID N/A 6/26/2024    Procedure: Bronchoscopy flexible and rigid;  Surgeon: Rod Nath MD;  Location:  GI    BYPASS GRAFT FEMOROPOPLITEAL Right 09/23/2020     Procedure: RIGHT FEMORAL GRAFT TO ABOVE-KNEE POPLITEAL BYPASS USING CADAVERIC SUPERFICIAL FEMORAL ARTERY;  Surgeon: Chadwick Lozano MD;  Location: [facility] OR    BYPASS GRAFT FEMOROPOPLITEAL Right 2/15/2022    Procedure: RIGHT SUPERFICIAL FEMORAL ARTERY GRAFT TO BELOW KNEE POPLITEAL BYPASS WITH CADAVERIC CRYOLIFE  FEMORAL-POPLITEAL ARTERY SIZE: OUTER DIAMETER: 7MM - 6MM;  Surgeon: Chadwick Lozano;  Location: [facility] OR    BYPASS GRAFT FEMOROPOPLITEAL Right 5/26/2023    Procedure: RIGHT DISTAL SUPERFICIAL FEMORAL GRAFT TO ANTERIOR TIBIAL ARTERY WITH 26 CENTIMETER CADAVERIC SUPERFICIAL FEMORAL ARTERY GRAFT;  Surgeon: Chadwick Lozano MD;  Location: [facility] OR    BYPASS GRAFT FEMOROPOPLITEAL Right 10/11/2023    Procedure: RIGHT FEMORAL TO POPLITEAL GRAFT THROMBECTOMY;  Surgeon: Chadwick Lozano MD;  Location: [facility] OR    BYPASS GRAFT INSITU FEMOROPOPLITEAL Right 7/7/2021    Procedure: CREATION RIGHT FEMORAL TO POPLITEAL ARTERIAL BYPASS USING INSITU VEIN GRAFT;  Surgeon: José Luis Hernnadez MD;  Location:  OR    CARDIAC CATHERIZATION  09/03/2009    multivessel CAD without target lesions, med mgmt indicated, preserved ef    CARPAL TUNNEL RELEASE RT/LT Right 05/20/2021    COLONOSCOPY N/A 12/12/2023    Procedure: Colonoscopy;  Surgeon: Corey Hoffman MD;  Location:  GI    COLONOSCOPY N/A 12/14/2023    Procedure: Colonoscopy;  Surgeon: Corey Hoffman MD;  Location:  GI    CV CORONARY ANGIOGRAM N/A 10/4/2023    Procedure: Coronary Angiogram;  Surgeon: Rogelio Ricks MD;  Location:  HEART CARDIAC CATH LAB    CV PCI N/A 10/4/2023    Procedure: Percutaneous Coronary Intervention;  Surgeon: Rogelio Ricks MD;  Location:  HEART CARDIAC CATH LAB    CV RIGHT HEART CATH MEASUREMENTS RECORDED N/A 11/27/2024    Procedure: Right Heart Cath;  Surgeon: Rogelio Ricks MD;  Location:  HEART CARDIAC CATH LAB    EMBOLECTOMY LOWER EXTREMITY Right 10/6/2023    Procedure: Right  anterior tibial bypass with graft, Right tibial endarterectomy,thrombectomy, Right doraslis pedis thrombectomy, Anterior Tibial vein patch.;  Surgeon: Chadwick Lozano MD;  Location:  OR    ENDARTERECTOMY CAROTID Right 05/11/2016    Procedure: ENDARTERECTOMY CAROTID;  Surgeon: Chadwick Lozano MD;  Location:  OR    ENDARTERECTOMY CAROTID Left 06/08/2020    Procedure: LEFT CAROTID ENDARTERECTOMY with distal facal vein patch  and EEG;  Surgeon: Chadwick Lozano MD;  Location:  OR    ESOPHAGOSCOPY, GASTROSCOPY, DUODENOSCOPY (EGD), COMBINED N/A 12/12/2023    Procedure: Esophagoscopy, gastroscopy, duodenoscopy (EGD), combined;  Surgeon: Corey Hoffman MD;  Location:  GI    FINE NEEDLE ASPIRATION WITHOUT IMAGING GUIDANCE Right 9/22/2023    Procedure: Fine needle aspiration without imaging guidance;  Surgeon: Kiersten Aguilera MD;  Location:  GI    FLUORESCENCE INTRAOPERATIVE VASCULAR ANGIOGRAPHY Right 12/28/2022    Procedure: RIGHT LEG ANGIOGRAM with intervention;  Surgeon: Chadwick Lozano MD;  Location:  OR    GYN SURGERY  left tube    left salpingectomy    IR ANGIOGRAM THROUGH CATHETER (ARTERIAL)  10/6/2023    IR ANGIOGRAM THROUGH CATHETER (ARTERIAL)  10/6/2023    IR ANGIOGRAM THROUGH CATHETER (ARTERIAL)  3/31/2024    IR ANGIOGRAM THROUGH CATHETER (ARTERIAL)  3/30/2024    IR CHEST TUBE PLACEMENT NON-TUNNELLED LEFT  6/23/2024    IR CVC TUNNEL PLACEMENT > 5 YRS OF AGE  4/3/2024    IR CVC TUNNEL PLACEMENT > 5 YRS OF AGE  6/23/2024    IR CVC TUNNEL REMOVAL RIGHT  5/28/2024    IR CVC TUNNEL REMOVAL RIGHT  7/3/2024    IR CVC TUNNEL REVISION RIGHT  4/15/2024    IR LOWER EXTREMITY ANGIOGRAM RIGHT  05/25/2021    IR LOWER EXTREMITY ANGIOGRAM RIGHT  10/5/2023    IR LOWER EXTREMITY ANGIOGRAM RIGHT  3/29/2024    IR OR ANGIOGRAM  6/23/2021    IR OR ANGIOGRAM  12/28/2022    IRRIGATION AND DEBRIDEMENT LOWER EXTREMITY, COMBINED Right 5/16/2024    Procedure: IRRIGATION AND DEBRIDEMENT  OF RIGHT ABOVE KNEE AMPUTATION;  Surgeon: Tay Enciso MD;  Location:  OR    IRRIGATION AND DEBRIDEMENT LOWER EXTREMITY, COMBINED Right 5/20/2024    Procedure: IRRIGATION AND DEBRIDMENT RIGHT LOWER EXTREMITY;  Surgeon: José Luis Hernandez MD;  Location:  OR    LAPAROSCOPIC CHOLECYSTECTOMY N/A 09/27/2017    Procedure: LAPAROSCOPIC CHOLECYSTECTOMY;  LAPAROSCOPIC CHOLECYSTECTOMY;  Surgeon: Jacoby Tapia MD;  Location:  [facility]    LAPAROSCOPY DIAGNOSTIC (GENERAL) N/A 8/11/2021    Procedure: Exploratory laparoscopy,  laparoscopic lysis of adhesions, laparotomy;  Surgeon: Corina Ferreira MD;  Location:  OR    OR ANGIOGRAM, LOWER EXTREMITY Right 10/11/2023    Procedure: Or Angiogram, Lower Extremity;  Surgeon: Chadwick Lozano MD;  Location:  OR    ORTHOPEDIC SURGERY      left knee surgery    REPAIR ANEURYSM FEMORAL ARTERY Left 12/28/2022    Procedure: REPAIR LEFT FEMORAL PSEUDOANEURYSM;  Surgeon: Chadwick Lozano MD;  Location:  OR    VASCULAR SURGERY  aoto bi fem  left fem-AK pop    [facility] FABRIC WRAPPING OF ABDOMINAL ANEURYSM      [facility] NONSPECIFIC PROCEDURE  12/2000    angioplasty with stent right fem. a.    [facility] NONSPECIFIC PROCEDURE  1987    sinus surgery    [facility] NONSPECIFIC PROCEDURE  09/02/2009    Emergent left groin exploration with oversewing of bleeding angiographic site. 2. Endarterectomy of common femoral-proximal superficial femoral artery with greater saphenous vein patch angioplasty.   a. Waldron of accessory right greater saphenous vein.     [facility] NONSPECIFIC PROCEDURE  08/27/2009    occluded left common iliac and external iliac arteries were successfully revascularized with stenting to 8 and 7 mm        Family History   Problem Relation Age of Onset    Cancer Mother         bladder    Cardiovascular Father         alive,multiple heart attacks    Diabetes Maternal Grandmother     Lung Cancer Maternal Grandmother     Blood Disease Brother         clotting disorder        Social History     Socioeconomic History    Marital status:      Spouse name: Not on file    Number of children: Not on file    Years of education: Not on file    Highest education level: Not on file   Occupational History    Not on file   Tobacco Use    Smoking status: Former     Current packs/day: 0.25     Average packs/day: 0.3 packs/day for 56.9 years (14.2 ttl pk-yrs)     Types: Cigarettes     Start date: 1968    Smokeless tobacco: Never    Tobacco comments:     1/2 PPD. 1-3 cigs a day   Vaping Use    Vaping status: Never Used   Substance and Sexual Activity    Alcohol use: Not Currently     Comment: x1-2 yr    Drug use: Yes     Types: Marijuana     Comment: 2x per day    Sexual activity: Yes     Partners: Male     Birth control/protection: Surgical   Other Topics Concern    Parent/sibling w/ CABG, MI or angioplasty before 65F 55M? Not Asked   Social History Narrative    Not on file     Social Drivers of Health     Financial Resource Strain: Low Risk  (11/21/2024)    Financial Resource Strain     Within the past 12 months, have you or your family members you live with been unable to get utilities (heat, electricity) when it was really needed?: No   Food Insecurity: Low Risk  (11/21/2024)    Food Insecurity     Within the past 12 months, did you worry that your food would run out before you got money to buy more?: No     Within the past 12 months, did the food you bought just not last and you didn t have money to get more?: No   Transportation Needs: Low Risk  (11/21/2024)    Transportation Needs     Within the past 12 months, has lack of transportation kept you from medical appointments, getting your medicines, non-medical meetings or appointments, work, or from getting things that you need?: No   Physical Activity: Not on file   Stress: Not on file   Social Connections: Not on file   Interpersonal Safety: Low Risk  (11/21/2024)    Interpersonal Safety     Do you feel physically and emotionally  safe where you currently live?: Yes     Within the past 12 months, have you been hit, slapped, kicked or otherwise physically hurt by someone?: No     Within the past 12 months, have you been humiliated or emotionally abused in other ways by your partner or ex-partner?: No   Housing Stability: Low Risk  (11/21/2024)    Housing Stability     Do you have housing? : Yes     Are you worried about losing your housing?: No       Jacoby Torres MD  Pulmonary and Critical Care Medicine  [facility]

## 2024-12-10 LAB
ANION GAP SERPL CALCULATED.3IONS-SCNC: 9 MMOL/L (ref 7–15)
BUN SERPL-MCNC: 96 MG/DL (ref 8–23)
CALCIUM SERPL-MCNC: 7.9 MG/DL (ref 8.8–10.4)
CHLORIDE SERPL-SCNC: 95 MMOL/L (ref 98–107)
CREAT SERPL-MCNC: 1.68 MG/DL (ref 0.51–0.95)
EGFRCR SERPLBLD CKD-EPI 2021: 33 ML/MIN/1.73M2
ERYTHROCYTE [DISTWIDTH] IN BLOOD BY AUTOMATED COUNT: 14.8 % (ref 10–15)
GLUCOSE SERPL-MCNC: 77 MG/DL (ref 70–99)
HCO3 SERPL-SCNC: 27 MMOL/L (ref 22–29)
HCT VFR BLD AUTO: 25.1 % (ref 35–47)
HGB BLD-MCNC: 7.9 G/DL (ref 11.7–15.7)
MCH RBC QN AUTO: 26.2 PG (ref 26.5–33)
MCHC RBC AUTO-ENTMCNC: 31.5 G/DL (ref 31.5–36.5)
MCV RBC AUTO: 83 FL (ref 78–100)
PLATELET # BLD AUTO: 150 10E3/UL (ref 150–450)
POTASSIUM SERPL-SCNC: 3.9 MMOL/L (ref 3.4–5.3)
RBC # BLD AUTO: 3.01 10E6/UL (ref 3.8–5.2)
SODIUM SERPL-SCNC: 131 MMOL/L (ref 135–145)
WBC # BLD AUTO: 3.5 10E3/UL (ref 4–11)

## 2024-12-10 PROCEDURE — 82435 ASSAY OF BLOOD CHLORIDE: CPT | Performed by: INTERNAL MEDICINE

## 2024-12-10 PROCEDURE — 250N000013 HC RX MED GY IP 250 OP 250 PS 637: Performed by: STUDENT IN AN ORGANIZED HEALTH CARE EDUCATION/TRAINING PROGRAM

## 2024-12-10 PROCEDURE — 82374 ASSAY BLOOD CARBON DIOXIDE: CPT | Performed by: INTERNAL MEDICINE

## 2024-12-10 PROCEDURE — 250N000013 HC RX MED GY IP 250 OP 250 PS 637: Performed by: INTERNAL MEDICINE

## 2024-12-10 PROCEDURE — 120N000001 HC R&B MED SURG/OB

## 2024-12-10 PROCEDURE — 999N000157 HC STATISTIC RCP TIME EA 10 MIN

## 2024-12-10 PROCEDURE — 94640 AIRWAY INHALATION TREATMENT: CPT | Mod: 76

## 2024-12-10 PROCEDURE — 85014 HEMATOCRIT: CPT | Performed by: STUDENT IN AN ORGANIZED HEALTH CARE EDUCATION/TRAINING PROGRAM

## 2024-12-10 PROCEDURE — 36415 COLL VENOUS BLD VENIPUNCTURE: CPT | Performed by: INTERNAL MEDICINE

## 2024-12-10 PROCEDURE — 94640 AIRWAY INHALATION TREATMENT: CPT

## 2024-12-10 PROCEDURE — 85041 AUTOMATED RBC COUNT: CPT | Performed by: STUDENT IN AN ORGANIZED HEALTH CARE EDUCATION/TRAINING PROGRAM

## 2024-12-10 PROCEDURE — 99232 SBSQ HOSP IP/OBS MODERATE 35: CPT | Performed by: STUDENT IN AN ORGANIZED HEALTH CARE EDUCATION/TRAINING PROGRAM

## 2024-12-10 PROCEDURE — 80048 BASIC METABOLIC PNL TOTAL CA: CPT | Performed by: INTERNAL MEDICINE

## 2024-12-10 PROCEDURE — 250N000009 HC RX 250: Performed by: STUDENT IN AN ORGANIZED HEALTH CARE EDUCATION/TRAINING PROGRAM

## 2024-12-10 RX ADMIN — CLOPIDOGREL BISULFATE 75 MG: 75 TABLET ORAL at 13:09

## 2024-12-10 RX ADMIN — CARVEDILOL 12.5 MG: 12.5 TABLET, FILM COATED ORAL at 08:38

## 2024-12-10 RX ADMIN — IPRATROPIUM BROMIDE AND ALBUTEROL SULFATE 3 ML: .5; 3 SOLUTION RESPIRATORY (INHALATION) at 19:25

## 2024-12-10 RX ADMIN — ACETYLCYSTEINE 4 ML: 200 SOLUTION ORAL; RESPIRATORY (INHALATION) at 14:50

## 2024-12-10 RX ADMIN — ACETAMINOPHEN 1000 MG: 500 TABLET, FILM COATED ORAL at 21:17

## 2024-12-10 RX ADMIN — TORSEMIDE 20 MG: 20 TABLET ORAL at 15:59

## 2024-12-10 RX ADMIN — IPRATROPIUM BROMIDE AND ALBUTEROL SULFATE 3 ML: .5; 3 SOLUTION RESPIRATORY (INHALATION) at 14:50

## 2024-12-10 RX ADMIN — FLUTICASONE FUROATE AND VILANTEROL TRIFENATATE 1 PUFF: 200; 25 POWDER RESPIRATORY (INHALATION) at 08:40

## 2024-12-10 RX ADMIN — AMLODIPINE BESYLATE 10 MG: 10 TABLET ORAL at 08:39

## 2024-12-10 RX ADMIN — CARVEDILOL 12.5 MG: 12.5 TABLET, FILM COATED ORAL at 18:03

## 2024-12-10 RX ADMIN — ISOSORBIDE MONONITRATE 120 MG: 60 TABLET, EXTENDED RELEASE ORAL at 08:39

## 2024-12-10 RX ADMIN — TORSEMIDE 20 MG: 20 TABLET ORAL at 08:39

## 2024-12-10 RX ADMIN — IPRATROPIUM BROMIDE AND ALBUTEROL SULFATE 3 ML: .5; 3 SOLUTION RESPIRATORY (INHALATION) at 07:08

## 2024-12-10 RX ADMIN — GABAPENTIN 200 MG: 100 CAPSULE ORAL at 21:17

## 2024-12-10 RX ADMIN — ACETYLCYSTEINE 4 ML: 200 SOLUTION ORAL; RESPIRATORY (INHALATION) at 07:09

## 2024-12-10 RX ADMIN — NICOTINE 1 PATCH: 21 PATCH, EXTENDED RELEASE TRANSDERMAL at 08:42

## 2024-12-10 RX ADMIN — ACETYLCYSTEINE 4 ML: 200 SOLUTION ORAL; RESPIRATORY (INHALATION) at 19:25

## 2024-12-10 RX ADMIN — APIXABAN 2.5 MG: 2.5 TABLET, FILM COATED ORAL at 06:54

## 2024-12-10 RX ADMIN — ROSUVASTATIN CALCIUM 10 MG: 10 TABLET, FILM COATED ORAL at 21:17

## 2024-12-10 ASSESSMENT — ACTIVITIES OF DAILY LIVING (ADL)
ADLS_ACUITY_SCORE: 56
ADLS_ACUITY_SCORE: 52
ADLS_ACUITY_SCORE: 56
ADLS_ACUITY_SCORE: 52
ADLS_ACUITY_SCORE: 56
ADLS_ACUITY_SCORE: 52
ADLS_ACUITY_SCORE: 56
ADLS_ACUITY_SCORE: 52
ADLS_ACUITY_SCORE: 56
ADLS_ACUITY_SCORE: 56
ADLS_ACUITY_SCORE: 52
ADLS_ACUITY_SCORE: 52
ADLS_ACUITY_SCORE: 56
ADLS_ACUITY_SCORE: 52
ADLS_ACUITY_SCORE: 52
ADLS_ACUITY_SCORE: 56
ADLS_ACUITY_SCORE: 52
ADLS_ACUITY_SCORE: 56
ADLS_ACUITY_SCORE: 56

## 2024-12-10 NOTE — PLAN OF CARE
SUMMARY: Admitted for evaluation of SOB.     DATE & TIME:12/09/24, 1930 - 0730  Cognitive Concerns/ Orientation: A&O x 4  BEHAVIOR & AGGRESSION TOOL COLOR: Green   ABNL VS/O2: VSS on O2 3 LPM via nasal with humidification   MOBILITY: Pivots to BSC.  Right AKA.   PAIN MANAGMENT: Prn Tylenol given for lower back pain  DIET: Renal, FR 1200 ml, Strict I&O  BOWEL/BLADDER: Intermittent incontinence, pure wick    ABNL LAB/BG: AM labs pending  DRAIN/DEVICES: PIV SL   SKIN: Dusky, blanchable redness to coccyx, mepilex on. Dressings to thoracentesis sites, CDI  TESTS/PROCEDURES: Left thoracentesis completed yesterday, 12/9/24  D/C DATE: TBD pending improvement   Discharge Barriers: Creatinine and O2 need improvement.  OTHER IMPORTANT INFO: On scheduled nebs, refusing BIPAP and chest physiotherapy. Nephrology signed off and Pulmonology following.

## 2024-12-10 NOTE — PROGRESS NOTES
[facility]    Medicine Progress Note - Hospitalist Service    Date of Admission:  11/20/2024    Assessment & Plan     Shirley Hendricks is a 64 year old female, on Plavix, with a history of COPD, hypertension, CHF, peripheral artery disease, NSTEMI, stage 4 CKD, and tobacco use who is being admitted for evaluation of shortness of breath.      Acute hypoxic respiratory failure-multifactorial.  Acute exacerbation of heart failure with preserved EF.  Moderate to severe left pleural effusion status post thoracentesis.  -Presents with 2-day history of increasing shortness of breath and a cough.  On admission chest x-ray shows a large left pleural effusion with compressive atelectasis.  There is also diffuse interstitial opacities consistent with pulmonary edema.    -Labs pertinent for a proBNP of 56[address]   -Patient underwent bilateral thoracentesis initially on 11/20 and the following day with good improvement in her symptoms.  -Fluid consistent with transudate effusion. Cultures negative. Cytology negative for malignancy  -Completed 5 days of corticosteroid  -TTE done during this hospitalization on 11/22/2024 showed LVEF normal at 55 to 60%.  RV function normal.  Mild 1+ MR, 1+ TR.  Mild pulmonary hypertension.  Compared to prior study there is no significant change  -Completed treatment for community-acquired pneumonia with cefuroxime and azithromycin  -Right heart catheterization done on 11/27/24 showed normal pressures, however this was after several  doses of diuretics.  Weight at that time was close to 104 pounds.  -Patient's oxygenation initially improved with aggressive diuretics and thoracentesis, however i patient started getting more hypoxic again on 12/3/2024 to a point of needing up to 5 L nasal cannula.  -Repeat chest x-ray performed on 12/3/2024 shows persistent pulmonary edema with ongoing recurrent bilateral pleural effusion.  Weight has gone up to 122 pounds on  12/5/2024  -Discussed with pulmonary and nephrology .  Received one-time dose of Bumex 2 mg on 12/4/2024  -Underwent repeat right-sided thoracentesis with removal of 1.4 L on 12/4/2024 and left-sided thoracentesis on 12/5/2024 with removal of 1.1 L fluid.  -CT chest without contrast was obtained right after left-sided thoracentesis on 12/5/2024 that continued to show significant atelectasis of the right and left lung bases.  There is concern for trapped lung causing recurrent pleural fluid reaccumulation into the empty space.  CT chest also showed a tiny pneumothorax.  Repeat chest x-ray on 12/6/2024 continues to show bibasilar atelectasis.  Likely secondary to thickened mucus  -Aggressive airway clearance with DuoNebs and Mucomyst ordered.  -Incentive spirometry and Aerobika 10 times an hour while awake.  -Pulmonary ordered for BiPAP twice a day to try and recruit the lung and prevent further pleural effusions.  -Tried chest physiotherapy but did not tolerate well.  Will discontinue for now.  -12/8-significant worsening hypoxia with increasing oxygen needs up to 10 L on oxy mask repeated chest CT that shows recurrent large bilateral pleural effusions with right greater than left.  Repeat ultrasound right thoracentesis with removal of 1500 cc.  Patient felt significantly better after this.  Left-sided thoracentesis being done on 12/9/2024.   - Unfortunately this has been a recurrent problem despite aggressive mucus clearance mechanisms and intermittent BiPAP.  -Discussed with pulmonary team today.  They did mention possibility of tunneled pleural catheter for her recurrent pleural effusions and felt that this would be an ongoing problem in the setting of bilateral basal collapse.  I will defer to pulmonary team to coordinate this and to discuss with the patient.     Community Acquired Pnuemonia  COPD with possible acute exacerbation  -Patient initially presented with acute hypoxic respiratory failure, initially felt  to be due to acute exacerbation of heart failure with preserved EF  -Initially she was diuresed, also received thoracentesis with improvement in oxygenation however now continues to require oxygen at 2-3 L  -Right heart cath showed normal pressures, cardiology feels that there is a significant component of COPD which is contributing to her symptoms and hypoxia  -Already completed 5 days steroid course and a round of antibiotics  -As mentioned above pulmonary following  -Most likely her hypoxia at this point is multifactorial with definite component of heart failure.  COPD is definitely contributing.  -Post repeat bilateral thoracentesis.  Continue to monitor for now.     Hypertensive urgency  New onset A-fib with RVR  Hyperlipidemia  History of coronary artery disease  Type II NSTEMI likely due to demand due to heart failure exacerbation.  -Initially found to be in A-fib, now converted to normal sinus rhythm  -Continue apixaban, currently on 2.5 mg twice a day based on body weight and renal function  -Prior to admission atenolol changed to carvedilol due to refractory hypertension  -Continue amlodipine 10 mg daily  -Continue Plavix which she takes for both history of coronary artery disease and peripheral arterial disease  -Isosorbide mononitrate increased to 120 mg daily  -Continue Crestor     Hyponatremia  -Sodium decreased from 133 on presentation to 121   -Nephrology following  -Did require 2% saline initially, currently at 133  -Continue 2 L fluid restriction  -Nephrology following  -Daily BMP next     Acute kidney injury on CKD stage III  Suspected ATN secondary to hypotension and ARB + diuretic  Borderline hyperkalemia  -Creatinine on admission of 1.71, which worsened with diuresis and relative hypotension.  -Creatinine peaked at 3.78 and now downtrending, good urine output  -Creatinine at 2.41 on 12/4/2024.  Received a diuretic challenge with Bumex 2 mg IV once.  And started on torsemide 20 mg daily which  was eventually uptitrated to twice daily.  -Creatinine slowly improving 2.94--141--2.08--2.06-1.95--1.76--1.54.  BUN improving as well.  Nephrology signed off on 12/9/2024.     GERD  -Continue with PTA famotidine     Anemia of Chronic Disease  Baseline hemoglobin has been between 8-9 over the past few months  -Hemoglobin overall stable between 9-10     Peripheral vascular disease with history of 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 2024  -Continue Plavix and statin  -Apixaban added for A-fib     History of thrombocytopenia  -Platelet count normal on admission, slightly at the moment but stable at 146.     Tobacco use  - Continue with nicotine patch  - Counselled on cessation          Diet: Fluid restriction 1200 ML FLUID  Combination Diet Low Saturated Fat Na <2400mg Diet    DVT Prophylaxis: {DVT PROPHYLAXIS:846295}  Alvarez Catheter: Not present  Lines: None     Cardiac Monitoring: None  Code Status: Full Code      Clinically Significant Risk Factors   { TIP  Diagnoses will continue to appear throughout the admission.  :95353}      # Hyponatremia: Lowest Na = 131 mmol/L in last 2 days, will monitor as appropriate  # Hypochloremia: Lowest Cl = 95 mmol/L in last 2 days, will monitor as appropriate      # Hypoalbuminemia: Lowest albumin = 3 g/dL at 11/21/2024  4:42 AM, will monitor as appropriate     # Hypertension: Noted on problem list             # Moderate Malnutrition: based on nutrition assessment    # Financial/Environmental Concerns: none         Social Drivers of Health    Tobacco Use: Medium Risk (11/14/2024)    Received from [facility]    Patient History     Smoking Tobacco Use: Former     Smokeless Tobacco Use: Never     Passive Exposure: Past          Disposition Plan   {TIP  It is advised to update the Medical Readiness for Discharge [MRD] daily, until the patient is 'Ready Now.' Last Documentation- Anticipated in 2-4 Days  . Use the SmartList below to update for today:420148}  Medically Ready for  "Discharge: {TIME; MEDICALLY READY FOR DISCHARGE:88192198}             Yoli Huizar MD  Hospitalist Service  [facility]  Securely message with "[employer]" (more info)  Text page via [employer] Paging/Directory   ______________________________________________________________________    Interval History   {Pertinent overnight events  ;***}    Physical Exam   Vital Signs: Temp: 98.3  F (36.8  C) Temp src: Oral BP: (!) 159/65 Pulse: 58   Resp: 18 SpO2: 92 % O2 Device: Nasal cannula Oxygen Delivery: 3 LPM  Weight: 110 lbs 3.68 oz    {Recommend personal SmartPhrase or Notewriter for exam (OPTIONAL)   :976894}    Medical Decision Making   { TIP   MDM Calculator    MDM grid (w/ times)    Coding Support Chat  Billing is now based on time OR medical decision making complexity. Medical decision making included in your A&P does NOT need to be re-documented here.    :64620}    {Time  :556344::\"*** MINUTES SPENT BY ME on the date of service doing chart review, history, exam, documentation & further activities per the note.\"}      Data   {INSERT LABS/IMAGING (OPTIONAL)   :501691}  "

## 2024-12-10 NOTE — PROGRESS NOTES
[facility]    Medicine Progress Note - Hospitalist Service    Date of Admission:  11/20/2024    Assessment & Plan      Shirley Hendricks is a 64 year old female, on Plavix, with a history of COPD, hypertension, CHF, peripheral artery disease, NSTEMI, stage 4 CKD, and tobacco use who is being admitted for evaluation of shortness of breath.      Acute hypoxic respiratory failure-multifactorial.  Acute exacerbation of heart failure with preserved EF.  Moderate to severe left pleural effusion status post thoracentesis.  -Presents with 2-day history of increasing shortness of breath and a cough.  On admission chest x-ray shows a large left pleural effusion with compressive atelectasis.  There is also diffuse interstitial opacities consistent with pulmonary edema.    -Labs pertinent for a proBNP [place] 188[address]   -Patient underwent bilateral thoracentesis initially on 11/20 and the following day with good improvement in her symptoms.  -Fluid consistent with transudate effusion. Cultures negative. Cytology negative for malignancy  -Completed 5 days of corticosteroid  -TTE done during this hospitalization on 11/22/2024 showed LVEF normal at 55 to 60%.  RV function normal.  Mild 1+ MR, 1+ TR.  Mild pulmonary hypertension.  Compared to prior study there is no significant change  -Completed treatment for community-acquired pneumonia with cefuroxime and azithromycin  -Right heart catheterization done on 11/27/24 showed normal pressures, however this was after several  doses of diuretics.  Weight at that time was close to 104 pounds.  -Patient's oxygenation initially improved with aggressive diuretics and thoracentesis, however i patient started getting more hypoxic again on 12/3/2024 to a point of needing up to 5 L nasal cannula.  -Repeat chest x-ray performed on 12/3/2024 shows persistent pulmonary edema with ongoing recurrent bilateral pleural effusion.  Weight has gone up to 122 pounds on  12/5/2024  -Discussed with pulmonary and nephrology .  Received one-time dose of Bumex 2 mg on 12/4/2024  -Underwent repeat right-sided thoracentesis with removal of 1.4 L on 12/4/2024 and left-sided thoracentesis on 12/5/2024 with removal of 1.1 L fluid.  -CT chest without contrast was obtained right after left-sided thoracentesis on 12/5/2024 that continued to show significant atelectasis of the right and left lung bases.  There is concern for trapped lung causing recurrent pleural fluid reaccumulation into the empty space.  CT chest also showed a tiny pneumothorax.  Repeat chest x-ray on 12/6/2024 continues to show bibasilar atelectasis.  Likely secondary to thickened mucus  -Aggressive airway clearance with DuoNebs and Mucomyst ordered.  -Incentive spirometry and Aerobika 10 times an hour while awake.  -Pulmonary ordered for BiPAP twice a day to try and recruit the lung and prevent further pleural effusions.  -Tried chest physiotherapy but did not tolerate well.  Will discontinue for now.  -12/8-significant worsening hypoxia with increasing oxygen needs up to 10 L on oxy mask repeated chest CT that shows recurrent large bilateral pleural effusions with right greater than left.  Repeat ultrasound right thoracentesis with removal of 1500 cc.  Patient felt significantly better after this.  Left-sided thoracentesis being done on 12/9/2024.   - Unfortunately this has been a recurrent problem despite aggressive mucus clearance mechanisms and intermittent BiPAP.  -Discussed with pulmonlogist Dr. Vega recommending Pleurx catheter on the right side   -Apixaban evening dose on hold and will consult IR tomorrow for Pluerex catheter placement  -Discussed with pulmonology recommending thoracic surgery consult for trapped lung      community Acquired Pnuemonia  COPD with possible acute exacerbation  -Patient initially presented with acute hypoxic respiratory failure, initially felt to be due to acute exacerbation of heart  failure with preserved EF  -Initially she was diuresed, also received thoracentesis with improvement in oxygenation however now continues to require oxygen at 2-3 L  -Right heart cath showed normal pressures, cardiology feels that there is a significant component of COPD which is contributing to her symptoms and hypoxia  -Already completed 5 days steroid course and a round of antibiotics  -As mentioned above pulmonary following  -Most likely her hypoxia at this point is multifactorial with definite component of heart failure.  COPD is definitely contributing.  -Post repeat bilateral thoracentesis.  Continue to monitor for now.     Hypertensive urgency  New onset A-fib with RVR  Hyperlipidemia  History of coronary artery disease  Type II NSTEMI likely due to demand due to heart failure exacerbation.  -Initially found to be in A-fib, now converted to normal sinus rhythm  -Continue apixaban, currently on 2.5 mg twice a day based on body weight and renal function  -Prior to admission atenolol changed to carvedilol due to refractory hypertension  -Continue amlodipine 10 mg daily  -Continue Plavix which she takes for both history of coronary artery disease and peripheral arterial disease  -Isosorbide mononitrate increased to 120 mg daily  -Continue Crestor     Hyponatremia  -Sodium decreased from 133 on presentation to 121   -Nephrology following  -Did require 2% saline initially, currently at 133  -Continue 2 L fluid restriction  -Nephrology following  -Daily BMP next     Acute kidney injury on CKD stage III  Suspected ATN secondary to hypotension and ARB + diuretic  Borderline hyperkalemia  -Creatinine on admission of 1.71, which worsened with diuresis and relative hypotension.  -Creatinine peaked at 3.78 and now downtrending, good urine output  -Creatinine at 2.41 on 12/4/2024.  Received a diuretic challenge with Bumex 2 mg IV once.  And started on torsemide 20 mg daily which was eventually uptitrated to twice  daily.  -Creatinine slowly improving 2.94--141--2.08--2.06-1.95--1.76--1.54.  BUN improving as well.  Nephrology signed off on 12/9/2024.     GERD  -Continue with PTA famotidine     Anemia of Chronic Disease  Baseline hemoglobin has been between 8-9 over the past few months  -Hemoglobin overall stable between 9-10     Peripheral vascular disease with history of 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 2024  -Continue Plavix and statin  -Apixaban added for A-fib     History of thrombocytopenia  -Platelet count normal on admission, slightly at the moment but stable at 146.     Tobacco use  - Continue with nicotine patch  - Counselled on cessation      # Family communication updated patient Sister Leigh.   Diet: Fluid restriction 1200 ML FLUID  Renal Diet (non-dialysis)    DVT Prophylaxis: DOAC  Alvarez Catheter: Not present  Lines: None     Cardiac Monitoring: None  Code Status: Full Code      Clinically Significant Risk Factors         # Hyponatremia: Lowest Na = 131 mmol/L in last 2 days, will monitor as appropriate  # Hypochloremia: Lowest Cl = 95 mmol/L in last 2 days, will monitor as appropriate      # Hypoalbuminemia: Lowest albumin = 3 g/dL at 11/21/2024  4:42 AM, will monitor as appropriate     # Hypertension: Noted on problem list             # Moderate Malnutrition: based on nutrition assessment    # Financial/Environmental Concerns: none         Social Drivers of Health    Tobacco Use: Medium Risk (11/14/2024)    Received from [facility]    Patient History     Smoking Tobacco Use: Former     Smokeless Tobacco Use: Never     Passive Exposure: Past          Disposition Plan     Medically Ready for Discharge: Anticipated in 2-4 Days             Yoli Huizar MD  Hospitalist Service  [facility]  Securely message with Gerard (more info)  Text page via [employer] Paging/Directory   ______________________________________________________________________    Interval History     No  acute overnight events.  Patient feels a lot better.  She is currently on 2 L of oxygen.  Not in respiratory distress.  Discussed the plan about Pleurx catheter for the patient.  She does not want to go to the [facility] for Pleurx catheter placement but okay for getting it done here.    Patient called her Sister Celestina on the phone and I did update her  Physical Exam   Vital Signs: Temp: 97.7  F (36.5  C) Temp src: Oral BP: (!) 157/116 Pulse: 54   Resp: 18 SpO2: 95 % O2 Device: Nasal cannula Oxygen Delivery: 3 LPM  Weight: 110 lbs 3.68 oz    Physical Exam  Cardiovascular:      Rate and Rhythm: Normal rate and regular rhythm.      Heart sounds: Normal heart sounds.   Pulmonary:      Effort: Pulmonary effort is normal. No respiratory distress.      Breath sounds: Normal breath sounds.   Abdominal:      General: There is no distension.      Palpations: Abdomen is soft.      Tenderness: There is no abdominal tenderness.          Medical Decision Making       45 MINUTES SPENT BY ME on the date of service doing chart review, history, exam, documentation & further activities per the note.      Data     I have personally reviewed the following data over the past 24 hrs:    3.5 (L)  \   7.9 (L)   / 150     131 (L) 95 (L) 96.0 (H) /  77   3.9 27 1.68 (H) \       Imaging results reviewed over the past 24 hrs:   No results found for this or any previous visit (from the past 24 hours).

## 2024-12-10 NOTE — PLAN OF CARE
Goal Outcome Evaluation:       SUMMARY: Admitted for evaluation of SOB.     DATE & TIME:12/10/24 [id number]  Cognitive Concerns/ Orientation : A&O x 4  BEHAVIOR & AGGRESSION TOOL COLOR: Green   ABNL VS/O2: VSS on O2 3 LPM via nasal with humidification   MOBILITY: Pivots to BSC.  Right AKA. Repositions self in bed.  PAIN MANAGMENT: Denies   DIET: Renal, FR 1200 ml, Strict I&O  BOWEL/BLADDER: Intermittent incontinence, pure wick in place    ABNL LAB/BG: , CR 1.68, CA 7.9, Hemoglobin 7.9  DRAIN/DEVICES: PIV SL   SKIN: Dusky, blanchable redness to coccyx, mepilex on. Dressings to thoracentesis sites, CDI  TESTS/PROCEDURES: None- multiple thoracentesis while in hospital   D/C DATE: TBD pending improvement   Discharge Barriers: Creatinine and O2 need improvement.  OTHER IMPORTANT INFO: On scheduled nebs, refusing BIPAP and chest physiotherapy. Nephrology signed off and Pulmonology following.  Pulmonary seeing patient tomorrow to discuss pleural catheter tube placement.  Eliquis on hold.

## 2024-12-10 NOTE — PROGRESS NOTES
Patient remains on 3 LPM nasal cannula w/ humidification. Lung sounds mostly diminished, refused nebulizer's at 1100 along with all chest physiotherapy treatments and BiPAP treatments. Will continue to follow.     Damion Barr, RT on 12/10/2024 at 4:18 PM

## 2024-12-11 LAB
ANION GAP SERPL CALCULATED.3IONS-SCNC: 9 MMOL/L (ref 7–15)
BUN SERPL-MCNC: 85.9 MG/DL (ref 8–23)
CALCIUM SERPL-MCNC: 8.1 MG/DL (ref 8.8–10.4)
CHLORIDE SERPL-SCNC: 95 MMOL/L (ref 98–107)
CREAT SERPL-MCNC: 1.57 MG/DL (ref 0.51–0.95)
EGFRCR SERPLBLD CKD-EPI 2021: 36 ML/MIN/1.73M2
ERYTHROCYTE [DISTWIDTH] IN BLOOD BY AUTOMATED COUNT: 14.8 % (ref 10–15)
GLUCOSE SERPL-MCNC: 81 MG/DL (ref 70–99)
HCO3 SERPL-SCNC: 29 MMOL/L (ref 22–29)
HCT VFR BLD AUTO: 24.4 % (ref 35–47)
HGB BLD-MCNC: 7.9 G/DL (ref 11.7–15.7)
LACTATE SERPL-SCNC: 0.3 MMOL/L (ref 0.7–2)
MCH RBC QN AUTO: 26.9 PG (ref 26.5–33)
MCHC RBC AUTO-ENTMCNC: 32.4 G/DL (ref 31.5–36.5)
MCV RBC AUTO: 83 FL (ref 78–100)
PLATELET # BLD AUTO: 152 10E3/UL (ref 150–450)
POTASSIUM SERPL-SCNC: 3.9 MMOL/L (ref 3.4–5.3)
RBC # BLD AUTO: 2.94 10E6/UL (ref 3.8–5.2)
SODIUM SERPL-SCNC: 133 MMOL/L (ref 135–145)
WBC # BLD AUTO: 3.6 10E3/UL (ref 4–11)

## 2024-12-11 PROCEDURE — 250N000013 HC RX MED GY IP 250 OP 250 PS 637: Performed by: STUDENT IN AN ORGANIZED HEALTH CARE EDUCATION/TRAINING PROGRAM

## 2024-12-11 PROCEDURE — 83605 ASSAY OF LACTIC ACID: CPT | Performed by: STUDENT IN AN ORGANIZED HEALTH CARE EDUCATION/TRAINING PROGRAM

## 2024-12-11 PROCEDURE — 94640 AIRWAY INHALATION TREATMENT: CPT | Mod: 76

## 2024-12-11 PROCEDURE — 94640 AIRWAY INHALATION TREATMENT: CPT

## 2024-12-11 PROCEDURE — 120N000001 HC R&B MED SURG/OB

## 2024-12-11 PROCEDURE — 36415 COLL VENOUS BLD VENIPUNCTURE: CPT | Performed by: STUDENT IN AN ORGANIZED HEALTH CARE EDUCATION/TRAINING PROGRAM

## 2024-12-11 PROCEDURE — 80048 BASIC METABOLIC PNL TOTAL CA: CPT | Performed by: INTERNAL MEDICINE

## 2024-12-11 PROCEDURE — 99233 SBSQ HOSP IP/OBS HIGH 50: CPT | Performed by: STUDENT IN AN ORGANIZED HEALTH CARE EDUCATION/TRAINING PROGRAM

## 2024-12-11 PROCEDURE — 85027 COMPLETE CBC AUTOMATED: CPT | Performed by: STUDENT IN AN ORGANIZED HEALTH CARE EDUCATION/TRAINING PROGRAM

## 2024-12-11 PROCEDURE — 999N000157 HC STATISTIC RCP TIME EA 10 MIN

## 2024-12-11 PROCEDURE — 84132 ASSAY OF SERUM POTASSIUM: CPT | Performed by: INTERNAL MEDICINE

## 2024-12-11 PROCEDURE — 82565 ASSAY OF CREATININE: CPT | Performed by: INTERNAL MEDICINE

## 2024-12-11 PROCEDURE — 250N000009 HC RX 250: Performed by: STUDENT IN AN ORGANIZED HEALTH CARE EDUCATION/TRAINING PROGRAM

## 2024-12-11 PROCEDURE — 250N000013 HC RX MED GY IP 250 OP 250 PS 637: Performed by: INTERNAL MEDICINE

## 2024-12-11 RX ADMIN — IPRATROPIUM BROMIDE AND ALBUTEROL SULFATE 3 ML: .5; 3 SOLUTION RESPIRATORY (INHALATION) at 07:40

## 2024-12-11 RX ADMIN — GABAPENTIN 200 MG: 100 CAPSULE ORAL at 21:24

## 2024-12-11 RX ADMIN — TORSEMIDE 20 MG: 20 TABLET ORAL at 08:41

## 2024-12-11 RX ADMIN — FLUTICASONE PROPIONATE 1 SPRAY: 50 SPRAY, METERED NASAL at 08:42

## 2024-12-11 RX ADMIN — ROSUVASTATIN CALCIUM 10 MG: 10 TABLET, FILM COATED ORAL at 21:24

## 2024-12-11 RX ADMIN — ACETYLCYSTEINE 4 ML: 200 SOLUTION ORAL; RESPIRATORY (INHALATION) at 07:40

## 2024-12-11 RX ADMIN — ACETAMINOPHEN 1000 MG: 500 TABLET, FILM COATED ORAL at 21:24

## 2024-12-11 RX ADMIN — IPRATROPIUM BROMIDE AND ALBUTEROL SULFATE 3 ML: .5; 3 SOLUTION RESPIRATORY (INHALATION) at 11:54

## 2024-12-11 RX ADMIN — ACETYLCYSTEINE 4 ML: 200 SOLUTION ORAL; RESPIRATORY (INHALATION) at 20:07

## 2024-12-11 RX ADMIN — CARVEDILOL 12.5 MG: 12.5 TABLET, FILM COATED ORAL at 08:41

## 2024-12-11 RX ADMIN — TORSEMIDE 20 MG: 20 TABLET ORAL at 16:33

## 2024-12-11 RX ADMIN — NICOTINE 1 PATCH: 21 PATCH, EXTENDED RELEASE TRANSDERMAL at 08:42

## 2024-12-11 RX ADMIN — ACETYLCYSTEINE 4 ML: 200 SOLUTION ORAL; RESPIRATORY (INHALATION) at 11:54

## 2024-12-11 RX ADMIN — ISOSORBIDE MONONITRATE 120 MG: 60 TABLET, EXTENDED RELEASE ORAL at 08:41

## 2024-12-11 RX ADMIN — CARVEDILOL 12.5 MG: 12.5 TABLET, FILM COATED ORAL at 17:37

## 2024-12-11 RX ADMIN — FLUTICASONE FUROATE AND VILANTEROL TRIFENATATE 1 PUFF: 200; 25 POWDER RESPIRATORY (INHALATION) at 08:42

## 2024-12-11 RX ADMIN — AMLODIPINE BESYLATE 10 MG: 10 TABLET ORAL at 08:41

## 2024-12-11 RX ADMIN — IPRATROPIUM BROMIDE AND ALBUTEROL SULFATE 3 ML: .5; 3 SOLUTION RESPIRATORY (INHALATION) at 20:07

## 2024-12-11 ASSESSMENT — ACTIVITIES OF DAILY LIVING (ADL)
ADLS_ACUITY_SCORE: 54
ADLS_ACUITY_SCORE: 52
ADLS_ACUITY_SCORE: 54
ADLS_ACUITY_SCORE: 54
ADLS_ACUITY_SCORE: 52
ADLS_ACUITY_SCORE: 52
ADLS_ACUITY_SCORE: 54
ADLS_ACUITY_SCORE: 54
ADLS_ACUITY_SCORE: 52
ADLS_ACUITY_SCORE: 54
ADLS_ACUITY_SCORE: 52
ADLS_ACUITY_SCORE: 54

## 2024-12-11 NOTE — PLAN OF CARE
SUMMARY: Admitted for evaluation of SOB. Multifactorial- COPD, PNA, CHF. S/p thoracentesis for effusion.     DATE & TIME: 12/11/2024 8840-5365  Cognitive Concerns/ Orientation : A&O x 4  BEHAVIOR & AGGRESSION TOOL COLOR: Green   ABNL VS/O2: /70, improved after AM meds to 162/78, Other VSS on O2 2 LPM via nasal with humidification, wean as able. Sats 90-92% throughout the day   MOBILITY: Pivots to BSC.  Right AKA. Repositions self in bed.  PAIN MANAGMENT: denies  DIET: Cardiac, FR 1200 ml, Strict I&O. NPO at midnight  BOWEL/BLADDER: Intermittent incontinence, pure wick in place. No BM today    ABNL LAB/BG: Na 133. Hgb 7.9. Creat 1.57 improving  DRAIN/DEVICES: PIV SL   SKIN: Dusky, blanchable redness to coccyx, mepilex on. Dressings to thoracentesis sites, CDI  TESTS/PROCEDURES: None today, possible Pleurx catheter placement, IR consulted- can't do today will be tomorrow  D/C DATE: TBD pending improvement 2+ days, lives at home with family  OTHER IMPORTANT INFO: On scheduled nebs, refusing BIPAP and chest physiotherapy. Nephrology signed off and Pulmonology following. Thoracic surgery consulted-agree with Pleurx.

## 2024-12-11 NOTE — PROGRESS NOTES
THORACIC SURGERY    Consult received, full note to follow.    64F with history of CAD on Plavix, CHF, COPD, CKD and anxiety. Recurrent bilateral pleural effusions requiring many thoracenteses. Transudate fluid with cultures and cytology negative.     Reviewed imaging. Likely has component of trapped lungs with fluid re-accumulation.    Discussed options for management including prn thoracentesis, PleurX catheters and surgical pleurodesis. She is not a candidate for surgical pleurodesis given the trapped lung. Agree to proceed with PleurX catheters.     Discussed the above with patient and her sister, Yue, on the phone.    Kathy Farias PA-C with Dr. [facility]arsh[facility]  Office: [phone]  Cell: [phone]

## 2024-12-11 NOTE — PROGRESS NOTES
"[facility] Pulmonary Consult Note    Date of Service: 12/11/24    Assessment and Recommendations:  64F COPD, HTN, CHF, PAD, CKDIV, CAD admitted 11/20 w/ worsening SOB and cough. She has acute hypoxemic respiratory failure 2/2 COPD, HFpEF, and transudative pleural effusions. These effusions are seemingly related to HFpEF and CKD. Given continued re-accumulation of these effusions, I feel she likely has trapped vs entrapped lung and her lung will not fully re-expand leading to continued pleural fluid collection.     - agree with tunneled pleural catheter, indicated on the right  - agree with thoracic surgery evaluation   - continue airway clearance  - continue ICS-LABA (Breo)  - continue scheduled duonebs  - diuresis per primary/cardiology    Pulmonary will follow peripherally.     Chief Complaint   Patient presents with    Breathing Problem       Summary:   2L NC. Currently, denies SOB. No chest pain or tightness. No wheezing. Minimal cough.     10 point review of systems negative, aside from that mentioned above    BP (!) 163/60 (BP Location: Right arm)   Pulse 56   Temp 98.5  F (36.9  C) (Oral)   Resp 18   Ht 1.575 m (5' 2\")   Wt 56.4 kg (124 lb 5.4 oz)   LMP  (LMP Unknown)   SpO2 93%   BMI 22.74 kg/m    Gen: NAD  HEENT: anicteric, OP clear  Card: RRR  Pulm: decreaed b/l bases  Abd: soft, NTND  MSK: no acute joint abnormalities  Skin: no obvious rash  Psych: normal affect  Neuro: answering questions appropriately     Labs: personally reviewed    Imaging: personally reviewed    Past Medical History:   Diagnosis Date    Anxiety 12/07/2017    Bilateral carpal tunnel syndrome     Charcot-Breonna-Tooth disease     COPD (chronic obstructive pulmonary disease) (H)     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 Simons dermatology    Embolism and thrombosis of unspecified artery (H) 08/2000    Protein C,S, Leiden FV, Lupus Inhibitor Negative    Gastroesophageal reflux " disease     Hyperlipidaemia     Hypertension     Lupus     skin    Mild major depression (H) 11/07/2017    Myocardial infarction (H)     x3    Osteoarthrosis, unspecified whether generalized or localized, unspecified site     PAD (peripheral artery disease) (H)     Peripheral vascular disease, unspecified (H) 12/2000    s/p angioplasty with stent right femoral a.; Right iliac and femoral a. clot    Post-menopausal     Reflux esophagitis 02/2004    EGD: esophagitis and medium HH    SBO (small bowel obstruction) (H) 08/10/2021    SVT (supraventricular tachycardia) (H)     Thrombocytopenia (H)     Uncomplicated asthma     Vitamin C deficiency 08/12/2018       Past Surgical History:   Procedure Laterality Date    AMPUTATE LEG ABOVE KNEE N/A 4/1/2024    Procedure: AMPUTATION, ABOVE KNEE right leg with wound vac dressing, excision of anteriotibial bypass graft, closure of (previous interventional radiology) left groin incision;  Surgeon: José Luis Hernandez MD;  Location:  OR    AMPUTATE LEG ABOVE KNEE Right 4/9/2024    Procedure: COMPLETION RIGHT ABOVE KNEE AMPUTATION;  Surgeon: José Luis Hernandez MD;  Location:  OR    ANGIOGRAM      ANGIOGRAM Right 6/23/2021    Procedure: RIGHT LOWER EXTREMITY ANGIOGRAM WITH LEFT BRACHIAL ARTERY CUTDOWN;  Surgeon: José Luis Hernandez MD;  Location:  OR    APPLY WOUND VAC Right 5/16/2024    Procedure: PLACEMENT OF WOUND VAC TO RIGHT ABOVE KNEE AMPUTATION;  Surgeon: Tay Enciso MD;  Location:  OR    APPLY WOUND VAC N/A 5/20/2024    Procedure: AND PLACEMENT OF WOUND VAC;  Surgeon: José Luis Hernandez MD;  Location:  OR    BIOPSY MASS NECK Right 10/11/2023    Procedure: Right Parotid Biopsy;  Surgeon: Randal Mendoza MD;  Location:  OR    BRONCHOSCOPY FLEXIBLE AND RIGID N/A 6/26/2024    Procedure: Bronchoscopy flexible and rigid;  Surgeon: Rod Nath MD;  Location:  GI    BYPASS GRAFT FEMOROPOPLITEAL Right 09/23/2020    Procedure: RIGHT FEMORAL  GRAFT TO ABOVE-KNEE POPLITEAL BYPASS USING CADAVERIC SUPERFICIAL FEMORAL ARTERY;  Surgeon: Chadwick Lozano MD;  Location: [facility] OR    BYPASS GRAFT FEMOROPOPLITEAL Right 2/15/2022    Procedure: RIGHT SUPERFICIAL FEMORAL ARTERY GRAFT TO BELOW KNEE POPLITEAL BYPASS WITH CADAVERIC CRYOLIFE  FEMORAL-POPLITEAL ARTERY SIZE: OUTER DIAMETER: 7MM - 6MM;  Surgeon: Chadwick Lozano;  Location: [facility] OR    BYPASS GRAFT FEMOROPOPLITEAL Right 5/26/2023    Procedure: RIGHT DISTAL SUPERFICIAL FEMORAL GRAFT TO ANTERIOR TIBIAL ARTERY WITH 26 CENTIMETER CADAVERIC SUPERFICIAL FEMORAL ARTERY GRAFT;  Surgeon: Chadwick Lozano MD;  Location: [facility] OR    BYPASS GRAFT FEMOROPOPLITEAL Right 10/11/2023    Procedure: RIGHT FEMORAL TO POPLITEAL GRAFT THROMBECTOMY;  Surgeon: Chadwick Lozano MD;  Location: [facility]    BYPASS GRAFT INSITU FEMOROPOPLITEAL Right 7/7/2021    Procedure: CREATION RIGHT FEMORAL TO POPLITEAL ARTERIAL BYPASS USING INSITU VEIN GRAFT;  Surgeon: José Luis Hernandez MD;  Location:  OR    CARDIAC CATHERIZATION  09/03/2009    multivessel CAD without target lesions, med mgmt indicated, preserved ef    CARPAL TUNNEL RELEASE RT/LT Right 05/20/2021    COLONOSCOPY N/A 12/12/2023    Procedure: Colonoscopy;  Surgeon: Corey Hoffman MD;  Location:  GI    COLONOSCOPY N/A 12/14/2023    Procedure: Colonoscopy;  Surgeon: Corey Hoffman MD;  Location:  GI    CV CORONARY ANGIOGRAM N/A 10/4/2023    Procedure: Coronary Angiogram;  Surgeon: Rogelio Ricks MD;  Location:  HEART CARDIAC CATH LAB    CV PCI N/A 10/4/2023    Procedure: Percutaneous Coronary Intervention;  Surgeon: Rogelio Ricks MD;  Location:  HEART CARDIAC CATH LAB    CV RIGHT HEART CATH MEASUREMENTS RECORDED N/A 11/27/2024    Procedure: Right Heart Cath;  Surgeon: Rogelio Ricks MD;  Location:  HEART CARDIAC CATH LAB    EMBOLECTOMY LOWER EXTREMITY Right 10/6/2023    Procedure: Right anterior tibial bypass  with graft, Right tibial endarterectomy,thrombectomy, Right doraslis pedis thrombectomy, Anterior Tibial vein patch.;  Surgeon: Chadwick Lozano MD;  Location:  OR    ENDARTERECTOMY CAROTID Right 05/11/2016    Procedure: ENDARTERECTOMY CAROTID;  Surgeon: Chadwick Lozano MD;  Location:  OR    ENDARTERECTOMY CAROTID Left 06/08/2020    Procedure: LEFT CAROTID ENDARTERECTOMY with distal facal vein patch  and EEG;  Surgeon: Chadwick Lozano MD;  Location:  OR    ESOPHAGOSCOPY, GASTROSCOPY, DUODENOSCOPY (EGD), COMBINED N/A 12/12/2023    Procedure: Esophagoscopy, gastroscopy, duodenoscopy (EGD), combined;  Surgeon: Corey Hoffman MD;  Location:  GI    FINE NEEDLE ASPIRATION WITHOUT IMAGING GUIDANCE Right 9/22/2023    Procedure: Fine needle aspiration without imaging guidance;  Surgeon: Kiersten Aguilera MD;  Location:  GI    FLUORESCENCE INTRAOPERATIVE VASCULAR ANGIOGRAPHY Right 12/28/2022    Procedure: RIGHT LEG ANGIOGRAM with intervention;  Surgeon: Chadwick Lozano MD;  Location:  OR    GYN SURGERY  left tube    left salpingectomy    IR ANGIOGRAM THROUGH CATHETER (ARTERIAL)  10/6/2023    IR ANGIOGRAM THROUGH CATHETER (ARTERIAL)  10/6/2023    IR ANGIOGRAM THROUGH CATHETER (ARTERIAL)  3/31/2024    IR ANGIOGRAM THROUGH CATHETER (ARTERIAL)  3/30/2024    IR CHEST TUBE PLACEMENT NON-TUNNELLED LEFT  6/23/2024    IR CVC TUNNEL PLACEMENT > 5 YRS OF AGE  4/3/2024    IR CVC TUNNEL PLACEMENT > 5 YRS OF AGE  6/23/2024    IR CVC TUNNEL REMOVAL RIGHT  5/28/2024    IR CVC TUNNEL REMOVAL RIGHT  7/3/2024    IR CVC TUNNEL REVISION RIGHT  4/15/2024    IR LOWER EXTREMITY ANGIOGRAM RIGHT  05/25/2021    IR LOWER EXTREMITY ANGIOGRAM RIGHT  10/5/2023    IR LOWER EXTREMITY ANGIOGRAM RIGHT  3/29/2024    IR OR ANGIOGRAM  6/23/2021    IR OR ANGIOGRAM  12/28/2022    IRRIGATION AND DEBRIDEMENT LOWER EXTREMITY, COMBINED Right 5/16/2024    Procedure: IRRIGATION AND DEBRIDEMENT OF RIGHT ABOVE KNEE  AMPUTATION;  Surgeon: Tay Enciso MD;  Location:  OR    IRRIGATION AND DEBRIDEMENT LOWER EXTREMITY, COMBINED Right 5/20/2024    Procedure: IRRIGATION AND DEBRIDMENT RIGHT LOWER EXTREMITY;  Surgeon: José Luis Hernandez MD;  Location:  OR    LAPAROSCOPIC CHOLECYSTECTOMY N/A 09/27/2017    Procedure: LAPAROSCOPIC CHOLECYSTECTOMY;  LAPAROSCOPIC CHOLECYSTECTOMY;  Surgeon: Jacoby Tapia MD;  Location:  [facility]    LAPAROSCOPY DIAGNOSTIC (GENERAL) N/A 8/11/2021    Procedure: Exploratory laparoscopy,  laparoscopic lysis of adhesions, laparotomy;  Surgeon: Corina Ferreira MD;  Location:  OR    OR ANGIOGRAM, LOWER EXTREMITY Right 10/11/2023    Procedure: Or Angiogram, Lower Extremity;  Surgeon: Chadwick Lozano MD;  Location:  OR    ORTHOPEDIC SURGERY      left knee surgery    REPAIR ANEURYSM FEMORAL ARTERY Left 12/28/2022    Procedure: REPAIR LEFT FEMORAL PSEUDOANEURYSM;  Surgeon: Chadwick Lozano MD;  Location:  OR    VASCULAR SURGERY  aoto bi fem  left fem-AK pop    [facility] FABRIC WRAPPING OF ABDOMINAL ANEURYSM      [facility] NONSPECIFIC PROCEDURE  12/2000    angioplasty with stent right fem. a.    [facility] NONSPECIFIC PROCEDURE  1987    sinus surgery    [facility] NONSPECIFIC PROCEDURE  09/02/2009    Emergent left groin exploration with oversewing of bleeding angiographic site. 2. Endarterectomy of common femoral-proximal superficial femoral artery with greater saphenous vein patch angioplasty.   a. [place] of accessory right greater saphenous vein.     [facility] NONSPECIFIC PROCEDURE  08/27/2009    occluded left common iliac and external iliac arteries were successfully revascularized with stenting to 8 and 7 mm        Family History   Problem Relation Age of Onset    Cancer Mother         bladder    Cardiovascular Father         alive,multiple heart attacks    Diabetes Maternal Grandmother     Lung Cancer Maternal Grandmother     Blood Disease Brother         clotting disorder       Social History      Socioeconomic History    Marital status:      Spouse name: Not on file    Number of children: Not on file    Years of education: Not on file    Highest education level: Not on file   Occupational History    Not on file   Tobacco Use    Smoking status: Former     Current packs/day: 0.25     Average packs/day: 0.3 packs/day for 56.9 years (14.2 ttl pk-yrs)     Types: Cigarettes     Start date: 1968    Smokeless tobacco: Never    Tobacco comments:     1/2 PPD. 1-3 cigs a day   Vaping Use    Vaping status: Never Used   Substance and Sexual Activity    Alcohol use: Not Currently     Comment: x1-2 yr    Drug use: Yes     Types: Marijuana     Comment: 2x per day    Sexual activity: Yes     Partners: Male     Birth control/protection: Surgical   Other Topics Concern    Parent/sibling w/ CABG, MI or angioplasty before 65F 55M? Not Asked   Social History Narrative    Not on file     Social Drivers of Health     Financial Resource Strain: Low Risk  (11/21/2024)    Financial Resource Strain     Within the past 12 months, have you or your family members you live with been unable to get utilities (heat, electricity) when it was really needed?: No   Food Insecurity: Low Risk  (11/21/2024)    Food Insecurity     Within the past 12 months, did you worry that your food would run out before you got money to buy more?: No     Within the past 12 months, did the food you bought just not last and you didn t have money to get more?: No   Transportation Needs: Low Risk  (11/21/2024)    Transportation Needs     Within the past 12 months, has lack of transportation kept you from medical appointments, getting your medicines, non-medical meetings or appointments, work, or from getting things that you need?: No   Physical Activity: Not on file   Stress: Not on file   Social Connections: Not on file   Interpersonal Safety: Low Risk  (11/21/2024)    Interpersonal Safety     Do you feel physically and emotionally safe where you currently  live?: Yes     Within the past 12 months, have you been hit, slapped, kicked or otherwise physically hurt by someone?: No     Within the past 12 months, have you been humiliated or emotionally abused in other ways by your partner or ex-partner?: No   Housing Stability: Low Risk  (11/21/2024)    Housing Stability     Do you have housing? : Yes     Are you worried about losing your housing?: No       Jacoby Torres MD  Pulmonary and Critical Care Medicine  [facility]

## 2024-12-11 NOTE — CONSULTS
THORACIC SURGERY CONSULTATION    Shirley Hendricks  1960   [id number]    Date of Admission: 11/20/2024  2:41 PM  Date of Consult: 12/11/2024     Referring Provider: Jacoby Torres MD  Consulting Thoracic Surgeon: Dr. Farshad Hardy    CC: Shortness of breath [R06.02]  Acute respiratory failure with hypoxia (H) [J96.01]  Acute on chronic heart failure, unspecified heart failure type (H) [I50.9]    History of the Present Illness: Shirley Hendricks is a 64-year-old woman with history of CAD on Plavix, CHF, COPD, CKD, peripheral vascular disease on Eliquis and anxiety. She has had numerous bilateral pleural effusions over the past 6 months requiring thoracentesis for symptom management. Pleural fluid has been transudative. Cultures and cytology have been negative. Etiology likely multifactorial with CHF and CKD contributing.     This afternoon, Shirley reports improvement in her shortness of breath following each thoracentesis, but frustrated that the fluid re-accumulates and her symptoms return. She has two children that live nearby and are a good support system. Her sister, Yue, was also on the phone during our visit.     Started smoking in teens. Currently smoking 2-3 cigarettes/day.      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 Simons dermatology    Embolism and thrombosis of unspecified artery (H) 08/2000    Protein C,S, Leiden FV, Lupus Inhibitor Negative    Gastroesophageal reflux disease     Hyperlipidaemia     Hypertension     Lupus     skin    Mild major depression (H) 11/07/2017    Myocardial infarction (H)     x3    Osteoarthrosis, unspecified whether generalized or localized, unspecified site     PAD (peripheral artery disease) (H)     Peripheral  vascular disease, unspecified (H) 12/2000    s/p angioplasty with stent right femoral a.; Right iliac and femoral a. clot    Post-menopausal     Reflux esophagitis 02/2004    EGD: esophagitis and medium HH    SBO (small bowel obstruction) (H) 08/10/2021    SVT (supraventricular tachycardia) (H)     Thrombocytopenia (H)     Uncomplicated asthma     Vitamin C deficiency 08/12/2018        Past Surgical History:  Past Surgical History:   Procedure Laterality Date    AMPUTATE LEG ABOVE KNEE N/A 4/1/2024    Procedure: AMPUTATION, ABOVE KNEE right leg with wound vac dressing, excision of anteriotibial bypass graft, closure of (previous interventional radiology) left groin incision;  Surgeon: José Luis Hernandez MD;  Location:  OR    AMPUTATE LEG ABOVE KNEE Right 4/9/2024    Procedure: COMPLETION RIGHT ABOVE KNEE AMPUTATION;  Surgeon: José Luis Hernandez MD;  Location:  OR    ANGIOGRAM      ANGIOGRAM Right 6/23/2021    Procedure: RIGHT LOWER EXTREMITY ANGIOGRAM WITH LEFT BRACHIAL ARTERY CUTDOWN;  Surgeon: José Luis Hernandez MD;  Location:  OR    APPLY WOUND VAC Right 5/16/2024    Procedure: PLACEMENT OF WOUND VAC TO RIGHT ABOVE KNEE AMPUTATION;  Surgeon: Tay Enciso MD;  Location:  OR    APPLY WOUND VAC N/A 5/20/2024    Procedure: AND PLACEMENT OF WOUND VAC;  Surgeon: José Luis Hernandez MD;  Location:  OR    BIOPSY MASS NECK Right 10/11/2023    Procedure: Right Parotid Biopsy;  Surgeon: Randal Mendoza MD;  Location:  OR    BRONCHOSCOPY FLEXIBLE AND RIGID N/A 6/26/2024    Procedure: Bronchoscopy flexible and rigid;  Surgeon: Rod Nath MD;  Location:  GI    BYPASS GRAFT FEMOROPOPLITEAL Right 09/23/2020    Procedure: RIGHT FEMORAL GRAFT TO ABOVE-KNEE POPLITEAL BYPASS USING CADAVERIC SUPERFICIAL FEMORAL ARTERY;  Surgeon: Chadwick Lozano MD;  Location:  OR    BYPASS GRAFT FEMOROPOPLITEAL Right 2/15/2022    Procedure: RIGHT SUPERFICIAL FEMORAL ARTERY GRAFT TO BELOW KNEE  POPLITEAL BYPASS WITH CADAVERIC CRYOLIFE  FEMORAL-POPLITEAL ARTERY SIZE: OUTER DIAMETER: 7MM - 6MM;  Surgeon: Chadwick Lozano;  Location:  OR    BYPASS GRAFT FEMOROPOPLITEAL Right 5/26/2023    Procedure: RIGHT DISTAL SUPERFICIAL FEMORAL GRAFT TO ANTERIOR TIBIAL ARTERY WITH 26 CENTIMETER CADAVERIC SUPERFICIAL FEMORAL ARTERY GRAFT;  Surgeon: Chadwick Lozano MD;  Location:  OR    BYPASS GRAFT FEMOROPOPLITEAL Right 10/11/2023    Procedure: RIGHT FEMORAL TO POPLITEAL GRAFT THROMBECTOMY;  Surgeon: Chadwick Lozano MD;  Location:  OR    BYPASS GRAFT INSITU FEMOROPOPLITEAL Right 7/7/2021    Procedure: CREATION RIGHT FEMORAL TO POPLITEAL ARTERIAL BYPASS USING INSITU VEIN GRAFT;  Surgeon: José Luis Hernandez MD;  Location:  OR    CARDIAC CATHERIZATION  09/03/2009    multivessel CAD without target lesions, med mgmt indicated, preserved ef    CARPAL TUNNEL RELEASE RT/LT Right 05/20/2021    COLONOSCOPY N/A 12/12/2023    Procedure: Colonoscopy;  Surgeon: Corey Hoffman MD;  Location:  GI    COLONOSCOPY N/A 12/14/2023    Procedure: Colonoscopy;  Surgeon: Corey Hoffman MD;  Location:  GI    CV CORONARY ANGIOGRAM N/A 10/4/2023    Procedure: Coronary Angiogram;  Surgeon: Rogelio Ricks MD;  Location:  HEART CARDIAC CATH LAB    CV PCI N/A 10/4/2023    Procedure: Percutaneous Coronary Intervention;  Surgeon: Rogelio Ricks MD;  Location:  HEART CARDIAC CATH LAB    CV RIGHT HEART CATH MEASUREMENTS RECORDED N/A 11/27/2024    Procedure: Right Heart Cath;  Surgeon: Rogelio Ricks MD;  Location:  HEART CARDIAC CATH LAB    EMBOLECTOMY LOWER EXTREMITY Right 10/6/2023    Procedure: Right anterior tibial bypass with graft, Right tibial endarterectomy,thrombectomy, Right doraslis pedis thrombectomy, Anterior Tibial vein patch.;  Surgeon: Chadwick Lozano MD;  Location:  OR    ENDARTERECTOMY CAROTID Right 05/11/2016    Procedure: ENDARTERECTOMY CAROTID;   Surgeon: Chadwick Lozano MD;  Location:  OR    ENDARTERECTOMY CAROTID Left 06/08/2020    Procedure: LEFT CAROTID ENDARTERECTOMY with distal facal vein patch  and EEG;  Surgeon: Chadwick Lozano MD;  Location:  OR    ESOPHAGOSCOPY, GASTROSCOPY, DUODENOSCOPY (EGD), COMBINED N/A 12/12/2023    Procedure: Esophagoscopy, gastroscopy, duodenoscopy (EGD), combined;  Surgeon: Corey Hoffman MD;  Location:  GI    FINE NEEDLE ASPIRATION WITHOUT IMAGING GUIDANCE Right 9/22/2023    Procedure: Fine needle aspiration without imaging guidance;  Surgeon: Kiersten Aguilera MD;  Location:  GI    FLUORESCENCE INTRAOPERATIVE VASCULAR ANGIOGRAPHY Right 12/28/2022    Procedure: RIGHT LEG ANGIOGRAM with intervention;  Surgeon: Chadwick Lozano MD;  Location:  OR    GYN SURGERY  left tube    left salpingectomy    IR ANGIOGRAM THROUGH CATHETER (ARTERIAL)  10/6/2023    IR ANGIOGRAM THROUGH CATHETER (ARTERIAL)  10/6/2023    IR ANGIOGRAM THROUGH CATHETER (ARTERIAL)  3/31/2024    IR ANGIOGRAM THROUGH CATHETER (ARTERIAL)  3/30/2024    IR CHEST TUBE PLACEMENT NON-TUNNELLED LEFT  6/23/2024    IR CVC TUNNEL PLACEMENT > 5 YRS OF AGE  4/3/2024    IR CVC TUNNEL PLACEMENT > 5 YRS OF AGE  6/23/2024    IR CVC TUNNEL REMOVAL RIGHT  5/28/2024    IR CVC TUNNEL REMOVAL RIGHT  7/3/2024    IR CVC TUNNEL REVISION RIGHT  4/15/2024    IR LOWER EXTREMITY ANGIOGRAM RIGHT  05/25/2021    IR LOWER EXTREMITY ANGIOGRAM RIGHT  10/5/2023    IR LOWER EXTREMITY ANGIOGRAM RIGHT  3/29/2024    IR OR ANGIOGRAM  6/23/2021    IR OR ANGIOGRAM  12/28/2022    IRRIGATION AND DEBRIDEMENT LOWER EXTREMITY, COMBINED Right 5/16/2024    Procedure: IRRIGATION AND DEBRIDEMENT OF RIGHT ABOVE KNEE AMPUTATION;  Surgeon: Tay Enciso MD;  Location:  OR    IRRIGATION AND DEBRIDEMENT LOWER EXTREMITY, COMBINED Right 5/20/2024    Procedure: IRRIGATION AND DEBRIDMENT RIGHT LOWER EXTREMITY;  Surgeon: José Luis Hernandez MD;  Location:  OR     LAPAROSCOPIC CHOLECYSTECTOMY N/A 09/27/2017    Procedure: LAPAROSCOPIC CHOLECYSTECTOMY;  LAPAROSCOPIC CHOLECYSTECTOMY;  Surgeon: Jacoby Tapia MD;  Location: [facility]    LAPAROSCOPY DIAGNOSTIC (GENERAL) N/A 8/11/2021    Procedure: Exploratory laparoscopy,  laparoscopic lysis of adhesions, laparotomy;  Surgeon: Corina Ferreira MD;  Location:  OR    OR ANGIOGRAM, LOWER EXTREMITY Right 10/11/2023    Procedure: Or Angiogram, Lower Extremity;  Surgeon: Chadwick Lozano MD;  Location:  OR    ORTHOPEDIC SURGERY      left knee surgery    REPAIR ANEURYSM FEMORAL ARTERY Left 12/28/2022    Procedure: REPAIR LEFT FEMORAL PSEUDOANEURYSM;  Surgeon: Chadwick Lozano MD;  Location:  OR    VASCULAR SURGERY  aoto bi fem  left fem-AK pop    [facility] FABRIC WRAPPING OF ABDOMINAL ANEURYSM      [facility] NONSPECIFIC PROCEDURE  12/2000    angioplasty with stent right fem. a.    [facility] NONSPECIFIC PROCEDURE  1987    sinus surgery    [facility] NONSPECIFIC PROCEDURE  09/02/2009    Emergent left groin exploration with oversewing of bleeding angiographic site. 2. Endarterectomy of common femoral-proximal superficial femoral artery with greater saphenous vein patch angioplasty.   a. [place] of accessory right greater saphenous vein.     [facility] NONSPECIFIC PROCEDURE  08/27/2009    occluded left common iliac and external iliac arteries were successfully revascularized with stenting to 8 and 7 mm        Family History:  Family History   Problem Relation Age of Onset    Cancer Mother         bladder    Cardiovascular Father         alive,multiple heart attacks    Diabetes Maternal Grandmother     Lung Cancer Maternal Grandmother     Blood Disease Brother         clotting disorder       Medications:  Current Outpatient Medications   Medication Sig Dispense Refill    albuterol (PROAIR HFA/PROVENTIL HFA/VENTOLIN HFA) 108 (90 Base) MCG/ACT inhaler Inhale 2 puffs into the lungs every 4 hours as needed for shortness of breath, wheezing or cough. 18 g  "0    apixaban ANTICOAGULANT (ELIQUIS) 2.5 MG tablet Take 1 tablet (2.5 mg) by mouth 2 times daily. 60 tablet 1    carvedilol (COREG) 12.5 MG tablet Take 1 tablet (12.5 mg) by mouth 2 times daily (with meals). 60 tablet 1    isosorbide mononitrate (IMDUR) 120 MG 24 HR ER tablet Take 1 tablet (120 mg) by mouth daily. 30 tablet 1       Physical Exam:   General:  Shirley is awake, alert, and cooperative.  NAD.  BP (!) 162/69   Pulse 58   Temp 97.6  F (36.4  C) (Oral)   Resp 17   Ht 1.575 m (5' 2\")   Wt 51.2 kg (112 lb 14 oz)   LMP  (LMP Unknown)   SpO2 97%   BMI 20.65 kg/m     HEENT: Grossly normal, trachea midline, EOM's intact  Respiratory: On 2L O2 via nasal cannula. No distress. Lung sounds are clear bilaterally.  Cardiac: Regular rate and rhythm, no murmurs  Abdomen: Soft, NDNT  Ext: S/p right lower extremity amputation  Neuro: No focal deficits    Imaging:  CT chest 12/8/24  IMPRESSION:   1.  Increased size of the large right and moderate size left pleural effusions with worsening atelectasis. There is now complete collapse of the bilateral lower lobes and right middle lobe.  2.  Interstitial pulmonary edema. New left upper lobe groundglass opacity which may represent a focus of alveolar edema versus an infectious/inflammatory focus.  3.  Worsening anasarca.  4.  Severe coronary atherosclerosis.  5.  Low attenuation of the blood pool which can be seen with anemia.    CXR 12/6/24  IMPRESSION: Small left and moderate right pleural effusions have not  appreciably changed. There is atelectasis/consolidation in both lower  lobes, also similar. Tiny left pneumothorax measuring a few  millimeters at the apex.    CT chest 12/5/24  IMPRESSION:   1.  Small bilateral pleural effusions have decreased since 11/21/2024.  2.  Tiny left basilar pneumothorax, likely related to recent  thoracentesis.  3.  Near complete atelectasis of the right lower lobe has worsened,  and dense atelectasis in the left lower lobe has improved " since  11/21/2024. Previously seen groundglass opacities in the right middle  lobe have resolved.  4.  Mild pulmonary edema.    CXR 12/3/24  IMPRESSION: Mild pulmonary edema, slightly increased compared to prior. Moderate bilateral pleural effusions and bilateral lower lobe consolidation, not significantly changed. No pneumothorax. Stable cardiomediastinal silhouette.     CT chest 11/21/24  IMPRESSION:   1.  Small to moderate right pleural effusion with adjacent  atelectasis. New patchy consolidative opacity within the right middle  lobe and lower lobe concerning for superimposed pneumonia with  possible component of reexpansion edema.  2.  Moderate left pleural effusion with complete atelectasis of the  left lower lobe.  3.  Mild interstitial pulmonary edema.    I personally reviewed the above imaging studies.      ASSESSMENT/PLAN:   Recurrent bilateral transudative pleural effusions.    We discussed options for management of the recurrent pleural effusions.   Can continue with prn thoracentesis for symptom management. She is frustrated with frequency of thoracentesis currently and would like a better solution.  PleurX catheters. Will need support system at home and home health care. Patient most interested in pursuing this option.  Surgical pleurodesis. Reviewed all of her imaging and discussed with Dr. Hardy (surgeon). She is not a candidate for surgical pleurodesis given the trapped lung and unlikely to be successful.    Agree with pulmonary team and proceed with IR consult for PleurX catheters. The thoracic team will sign off at this time. Please call with any additional questions or concerns.      Kathy Farias PA-C with Dr. Farshad Hardy  [facility]  Office: [phone]  Cell: [phone]

## 2024-12-11 NOTE — PROGRESS NOTES
CLINICAL NUTRITION SERVICES - REASSESSMENT NOTE    Recommendations Ordered by Registered Dietitian (RD):   Ordered chocolate LemonCrate standard 1.0 at 10am on ice   Malnutrition: (11/27)  % Weight Loss:  Weight loss does not meet criteria for malnutrition - pt w/ AKA during time of wt loss  % Intake:  No decreased intake noted  Subcutaneous Fat Loss:  Global mild losses   Muscle Loss:  Global moderate  Fluid Retention:  Trace     Malnutrition Diagnosis: Moderate malnutrition  In Context of:  Chronic illness or disease     EVALUATION OF PROGRESS TOWARD GOALS   Diet:  Low Saturated Fat; Na <2400 mg; 1200 mL fluid restriction    Intake/Tolerance:    - 100% intakes and good diet/feeding tolerance per nursing notes  - Ordering TID meals per healthtouch  - Spoke with patient at bedside. She said her appetite is much improved after her diet changed from the renal diet. She said eating is going well and she is tolerating the food this admission. She doesn't like Ensure because of the gluten content. She said gluten causes GI discomfort. She is open to trying LemonCrate (gluten free) as long as it's on ice to help meet nutritional needs while acutely admitted.     ASSESSED NUTRITION NEEDS:  Dosing Weight: 47.5 kg   Estimated Energy Needs: 2083-6087+ kcals (25-30+ Kcal/Kg)  Justification: maintenance + COPD  Estimated Protein Needs: 48-62 grams (1-1.3 g pro/Kg)  Justification:  stage 4 CKD while hospitalized  Estimated Fluid Needs: 1800 ml restriction  Justification: per provider pending fluid status    NEW FINDINGS:   Labs:   Na 133 (L)  BUN 85.9 (H), Cr 1.57 (H) - both trending down    Weight: wt fluctuations likely d/t fluid status shifts  12/11/24 0740 51.2 kg (112 lb 14 oz) Bed scale   12/09/24 0629 50 kg (110 lb 3.7 oz) Bed scale   12/09/24 0543 54.6 kg (120 lb 5.9 oz) Bed scale   12/06/24 1300 50 kg (110 lb 3.7 oz) Bed scale   12/06/24 0500 55.8 kg (123 lb 0.3 oz) Bed scale   12/05/24 0654 55.5 kg (122 lb 5.7 oz) Bed  scale   12/04/24 0700 55 kg (121 lb 4.1 oz) Bed scale   12/03/24 0628 54.8 kg (120 lb 13 oz) Bed scale   12/02/24 0547 54 kg (119 lb 0.8 oz) Bed scale   12/01/24 0637 49.1 kg (108 lb 3.9 oz) Bed scale   11/29/24 0543 50.8 kg (111 lb 15.9 oz) Bed scale   11/28/24 0626 43.6 kg (96 lb 1.9 oz) Bed scale     Previous Goals:   PO intakes >/= 75% adequately nourishing meals TID   Evaluation: Met    Previous Nutrition Diagnosis:   Inadequate oral intake related to multiple chronic co-morbidities and diet interruptions as evidenced by mild fat and moderate muscle losses   Evaluation: Improving    CURRENT NUTRITION DIAGNOSIS  No nutrition diagnosis identified at this time    INTERVENTIONS  Recommendations / Nutrition Prescription  See above    Implementation  Medical Food Supplement - ordered    Goals  Patient to consume at least 75% of TID meals    MONITORING AND EVALUATION:  Progress towards goals will be monitored and evaluated per protocol and Practice Guidelines    Vangie Dukes RD, LD  Clinical Dietitian - [facility]

## 2024-12-11 NOTE — PROGRESS NOTES
Chest physiotherapy order discontinued due to pt continually refusing.    Tamica Pedraza, RT  December 11, 2024

## 2024-12-11 NOTE — PROGRESS NOTES
[facility]    Medicine Progress Note - Hospitalist Service    Date of Admission:  11/20/2024    Assessment & Plan      Shirley Hendricks is a 64 year old female, on Plavix, with a history of COPD, hypertension, CHF, peripheral artery disease, NSTEMI, stage 4 CKD, and tobacco use who is being admitted for evaluation of shortness of breath.      Acute hypoxic respiratory failure-multifactorial.  Acute exacerbation of heart failure with preserved EF.  Moderate to severe left pleural effusion status post thoracentesis.  -Presents with 2-day history of increasing shortness of breath and a cough.  On admission chest x-ray shows a large left pleural effusion with compressive atelectasis.  There is also diffuse interstitial opacities consistent with pulmonary edema.    -Labs pertinent for a proBNP of 41919   -Patient underwent bilateral thoracentesis initially on 11/20 and the following day with good improvement in her symptoms.  -Fluid consistent with transudate effusion. Cultures negative. Cytology negative for malignancy  -Completed 5 days of corticosteroid  -TTE done during this hospitalization on 11/22/2024 showed LVEF normal at 55 to 60%.  RV function normal.  Mild 1+ MR, 1+ TR.  Mild pulmonary hypertension.  Compared to prior study there is no significant change  -Completed treatment for community-acquired pneumonia with cefuroxime and azithromycin  -Right heart catheterization done on 11/27/24 showed normal pressures, however this was after several  doses of diuretics.  Weight at that time was close to 104 pounds.  -Patient's oxygenation initially improved with aggressive diuretics and thoracentesis, however i patient started getting more hypoxic again on 12/3/2024 to a point of needing up to 5 L nasal cannula.  -Repeat chest x-ray performed on 12/3/2024 shows persistent pulmonary edema with ongoing recurrent bilateral pleural effusion.  Weight has gone up to 122 pounds on  12/5/2024  -Discussed with pulmonary and nephrology .  Received one-time dose of Bumex 2 mg on 12/4/2024  -Underwent repeat right-sided thoracentesis with removal of 1.4 L on 12/4/2024 and left-sided thoracentesis on 12/5/2024 with removal of 1.1 L fluid.  -CT chest without contrast was obtained right after left-sided thoracentesis on 12/5/2024 that continued to show significant atelectasis of the right and left lung bases.  There is concern for trapped lung causing recurrent pleural fluid reaccumulation into the empty space.  CT chest also showed a tiny pneumothorax.  Repeat chest x-ray on 12/6/2024 continues to show bibasilar atelectasis.  Likely secondary to thickened mucus  -Aggressive airway clearance with DuoNebs and Mucomyst ordered.  -Incentive spirometry and Aerobika 10 times an hour while awake.  -Pulmonary ordered for BiPAP twice a day to try and recruit the lung and prevent further pleural effusions.  -Tried chest physiotherapy but did not tolerate well.  Will discontinue for now.  -12/8-significant worsening hypoxia with increasing oxygen needs up to 10 L on oxy mask repeated chest CT that shows recurrent large bilateral pleural effusions with right greater than left.  Repeat ultrasound right thoracentesis with removal of 1500 cc.  Patient felt significantly better after this.  Left-sided thoracentesis being done on 12/9/2024.   - Unfortunately this has been a recurrent problem despite aggressive mucus clearance mechanisms and intermittent BiPAP.  12/10/24-Discussed with pulmonlogist Dr. eVga recommending Pleurx catheter on the right side   -Apixaban evening dose on hold and will consult IR tomorrow for Pluerex catheter placement  -Discussed with pulmonology recommending thoracic surgery consult for trapped lung      12/11/24-  Pulmonology did discuss with the patient and his sister about Pleurx catheter placement.  discussed with interventional radiology and Pleurx catheter tomorrow.  N.p.o. after  midnight.  Apixaban on hold    Plan would be to follow-up with outpatient interventional pulmonology and cardiology to continue diuretics    -Continue diuretics  -Strict intake and output  -Follow thoracic surgery recommendation      community Acquired Pnuemonia  COPD with possible acute exacerbation  -Patient initially presented with acute hypoxic respiratory failure, initially felt to be due to acute exacerbation of heart failure with preserved EF  -Initially she was diuresed, also received thoracentesis with improvement in oxygenation however now continues to require oxygen at 2-3 L  -Right heart cath showed normal pressures, cardiology feels that there is a significant component of COPD which is contributing to her symptoms and hypoxia  -Already completed 5 days steroid course and a round of antibiotics  -As mentioned above pulmonary following  -Most likely her hypoxia at this point is multifactorial with definite component of heart failure.  COPD is definitely contributing.  -Post repeat bilateral thoracentesis.  -Continue outpatient follow with pulmonology  -Might need nebulizer machine.     Hypertensive urgency  New onset A-fib with RVR  Hyperlipidemia  History of coronary artery disease  Type II NSTEMI likely due to demand due to heart failure exacerbation.  -Initially found to be in A-fib, now converted to normal sinus rhythm  -Continue apixaban, currently on 2.5 mg twice a day based on body weight and renal function  -Prior to admission atenolol changed to carvedilol due to refractory hypertension  -Continue amlodipine 10 mg daily  -Continue Plavix which she takes for both history of coronary artery disease and peripheral arterial disease  -Isosorbide mononitrate increased to 120 mg daily  -Continue Crestor  -Apixaban on hold for procedure     Hyponatremia  -Sodium decreased from 133 on presentation to 121   -Nephrology following  -Did require 2% saline initially, currently at 133  -Continue 2 L fluid  restriction  -Nephrology following  -Daily BMP next  -Sodium 133 on 12/11/2024     Acute kidney injury on CKD stage III  Suspected ATN secondary to hypotension and ARB + diuretic  Borderline hyperkalemia  -Creatinine on admission of 1.71, which worsened with diuresis and relative hypotension.  -Creatinine peaked at 3.78 and now downtrending, good urine output  -Creatinine at 2.41 on 12/4/2024.  Received a diuretic challenge with Bumex 2 mg IV once.  And started on torsemide 20 mg daily which was eventually uptitrated to twice daily.  -Creatinine slowly improving 2.94--141--2.08--2.06-1.95--1.76--1.54.-1.68/1.57 BUN improving as well.  Nephrology signed off on 12/9/2024.     GERD  -Continue with PTA famotidine     Anemia of Chronic Disease  Baseline hemoglobin has been between 8-9 over the past few months  Hemoglobin currently is 7.9 has been stable in the last 3 days with no active bleeding     Peripheral vascular disease with history of 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 2024  -Continue Plavix and statin  -Hold apixaban for procedure tomorrowHistory of thrombocytopenia  -Platelet count normal on admission, slightly at the moment but stable at 146.     Tobacco use  - Continue with nicotine patch  - Counselled on cessation      # Family communication updated patient Sister Marcio .   Diet: Fluid restriction 1200 ML FLUID  Snacks/Supplements Adult: Margarita Garrison Standard Oral Supplement; Between Meals  Combination Diet Low Saturated Fat Na <2400mg Diet  NPO per Anesthesia Guidelines for Procedure/Surgery Except for: Meds    DVT Prophylaxis: DOAC  Alvarez Catheter: Not present  Lines: None     Cardiac Monitoring: None  Code Status: Full Code      Clinically Significant Risk Factors         # Hyponatremia: Lowest Na = 131 mmol/L in last 2 days, will monitor as appropriate  # Hypochloremia: Lowest Cl = 95 mmol/L in last 2 days, will monitor as appropriate      # Hypoalbuminemia: Lowest albumin = 3 g/dL at 11/21/2024  4:42 AM, will monitor  as appropriate     # Hypertension: Noted on problem list             # Moderate Malnutrition: based on nutrition assessment    # Financial/Environmental Concerns: none         Social Drivers of Health    Tobacco Use: Medium Risk (11/14/2024)    Received from [employer]    Patient History     Smoking Tobacco Use: Former     Smokeless Tobacco Use: Never     Passive Exposure: Past          Disposition Plan     Medically Ready for Discharge: Anticipated in 2-4 Days             Yoli Huizar MD  Hospitalist Service  [facility]  Securely message with [employer] (more info)  Text page via [employer] Paging/Directory   ______________________________________________________________________    Interval History     No acute overnight events.  Patient feels a lot better.  She is currently on 2 L of oxygen.  Not in respiratory distress.   Pulmonology at bedside.   Patient feels well  Denies any chest pain or shortness of breath.    Physical Exam   Vital Signs: Temp: 97.9  F (36.6  C) Temp src: Oral BP: (!) 158/70 Pulse: 60   Resp: 18 SpO2: (!) 89 % O2 Device: Nasal cannula with humidification Oxygen Delivery: 2 LPM  Weight: 112 lbs 14.01 oz    Physical Exam  Cardiovascular:      Rate and Rhythm: Normal rate and regular rhythm.      Heart sounds: Normal heart sounds.   Pulmonary:      Effort: Pulmonary effort is normal. No respiratory distress.      Breath sounds: Normal breath sounds.   Abdominal:      General: There is no distension.      Palpations: Abdomen is soft.      Tenderness: There is no abdominal tenderness.          Medical Decision Making       50 MINUTES SPENT BY ME on the date of service doing chart review, history, exam, documentation & further activities per the note.  Discussed with pulmonology updated patient's nurse  And also discussed with interventional radiology.  Patient and her sister were updated about the plan  Data     I have personally reviewed the  following data over the past 24 hrs:    3.6 (L)  \   7.9 (L)   / 152     133 (L) 95 (L) 85.9 (H) /  81   3.9 29 1.57 (H) \     Procal: N/A CRP: N/A Lactic Acid: 0.3 (L)         Imaging results reviewed over the past 24 hrs:   No results found for this or any previous visit (from the past 24 hours).

## 2024-12-12 ENCOUNTER — MEDICAL CORRESPONDENCE (OUTPATIENT)
Dept: HEALTH INFORMATION MANAGEMENT | Facility: CLINIC | Age: 64
End: 2024-12-12

## 2024-12-12 ENCOUNTER — APPOINTMENT (OUTPATIENT)
Dept: INTERVENTIONAL RADIOLOGY/VASCULAR | Facility: CLINIC | Age: 64
DRG: 280 | End: 2024-12-12
Attending: STUDENT IN AN ORGANIZED HEALTH CARE EDUCATION/TRAINING PROGRAM
Payer: COMMERCIAL

## 2024-12-12 ENCOUNTER — TELEPHONE (OUTPATIENT)
Dept: PHYSICAL MEDICINE AND REHAB | Facility: CLINIC | Age: 64
End: 2024-12-12

## 2024-12-12 VITALS
WEIGHT: 110.23 LBS | DIASTOLIC BLOOD PRESSURE: 60 MMHG | RESPIRATION RATE: 16 BRPM | SYSTOLIC BLOOD PRESSURE: 157 MMHG | BODY MASS INDEX: 20.28 KG/M2 | HEIGHT: 62 IN | HEART RATE: 50 BPM | TEMPERATURE: 97.5 F | OXYGEN SATURATION: 99 %

## 2024-12-12 LAB
ANION GAP SERPL CALCULATED.3IONS-SCNC: 7 MMOL/L (ref 7–15)
BUN SERPL-MCNC: 72.4 MG/DL (ref 8–23)
CALCIUM SERPL-MCNC: 7.9 MG/DL (ref 8.8–10.4)
CHLORIDE SERPL-SCNC: 96 MMOL/L (ref 98–107)
CREAT SERPL-MCNC: 1.53 MG/DL (ref 0.51–0.95)
EGFRCR SERPLBLD CKD-EPI 2021: 37 ML/MIN/1.73M2
ERYTHROCYTE [DISTWIDTH] IN BLOOD BY AUTOMATED COUNT: 14.7 % (ref 10–15)
GLUCOSE SERPL-MCNC: 163 MG/DL (ref 70–99)
HCO3 SERPL-SCNC: 33 MMOL/L (ref 22–29)
HCT VFR BLD AUTO: 24.8 % (ref 35–47)
HGB BLD-MCNC: 8.2 G/DL (ref 11.7–15.7)
MCH RBC QN AUTO: 27.3 PG (ref 26.5–33)
MCHC RBC AUTO-ENTMCNC: 33.1 G/DL (ref 31.5–36.5)
MCV RBC AUTO: 83 FL (ref 78–100)
PLATELET # BLD AUTO: 148 10E3/UL (ref 150–450)
POTASSIUM SERPL-SCNC: 4 MMOL/L (ref 3.4–5.3)
RBC # BLD AUTO: 3 10E6/UL (ref 3.8–5.2)
SODIUM SERPL-SCNC: 136 MMOL/L (ref 135–145)
WBC # BLD AUTO: 4 10E3/UL (ref 4–11)

## 2024-12-12 PROCEDURE — 94640 AIRWAY INHALATION TREATMENT: CPT | Mod: 76

## 2024-12-12 PROCEDURE — 80048 BASIC METABOLIC PNL TOTAL CA: CPT | Performed by: INTERNAL MEDICINE

## 2024-12-12 PROCEDURE — 250N000009 HC RX 250: Performed by: PHYSICIAN ASSISTANT

## 2024-12-12 PROCEDURE — 99152 MOD SED SAME PHYS/QHP 5/>YRS: CPT

## 2024-12-12 PROCEDURE — 85014 HEMATOCRIT: CPT | Performed by: STUDENT IN AN ORGANIZED HEALTH CARE EDUCATION/TRAINING PROGRAM

## 2024-12-12 PROCEDURE — 250N000013 HC RX MED GY IP 250 OP 250 PS 637: Performed by: INTERNAL MEDICINE

## 2024-12-12 PROCEDURE — 250N000011 HC RX IP 250 OP 636: Performed by: PHYSICIAN ASSISTANT

## 2024-12-12 PROCEDURE — 120N000001 HC R&B MED SURG/OB

## 2024-12-12 PROCEDURE — 250N000013 HC RX MED GY IP 250 OP 250 PS 637: Performed by: STUDENT IN AN ORGANIZED HEALTH CARE EDUCATION/TRAINING PROGRAM

## 2024-12-12 PROCEDURE — C1729 CATH, DRAINAGE: HCPCS

## 2024-12-12 PROCEDURE — 99232 SBSQ HOSP IP/OBS MODERATE 35: CPT | Performed by: STUDENT IN AN ORGANIZED HEALTH CARE EDUCATION/TRAINING PROGRAM

## 2024-12-12 PROCEDURE — 0W9930Z DRAINAGE OF RIGHT PLEURAL CAVITY WITH DRAINAGE DEVICE, PERCUTANEOUS APPROACH: ICD-10-PCS | Performed by: RADIOLOGY

## 2024-12-12 PROCEDURE — 999N000157 HC STATISTIC RCP TIME EA 10 MIN

## 2024-12-12 PROCEDURE — 36415 COLL VENOUS BLD VENIPUNCTURE: CPT | Performed by: STUDENT IN AN ORGANIZED HEALTH CARE EDUCATION/TRAINING PROGRAM

## 2024-12-12 PROCEDURE — 250N000009 HC RX 250: Performed by: STUDENT IN AN ORGANIZED HEALTH CARE EDUCATION/TRAINING PROGRAM

## 2024-12-12 PROCEDURE — 272N000500 HC NEEDLE CR2

## 2024-12-12 PROCEDURE — C1769 GUIDE WIRE: HCPCS

## 2024-12-12 RX ORDER — NALOXONE HYDROCHLORIDE 0.4 MG/ML
0.4 INJECTION, SOLUTION INTRAMUSCULAR; INTRAVENOUS; SUBCUTANEOUS
Status: DISCONTINUED | OUTPATIENT
Start: 2024-12-12 | End: 2024-12-13 | Stop reason: HOSPADM

## 2024-12-12 RX ORDER — FENTANYL CITRATE 50 UG/ML
25-50 INJECTION, SOLUTION INTRAMUSCULAR; INTRAVENOUS EVERY 5 MIN PRN
Status: DISCONTINUED | OUTPATIENT
Start: 2024-12-12 | End: 2024-12-13 | Stop reason: HOSPADM

## 2024-12-12 RX ORDER — NALOXONE HYDROCHLORIDE 0.4 MG/ML
0.2 INJECTION, SOLUTION INTRAMUSCULAR; INTRAVENOUS; SUBCUTANEOUS
Status: DISCONTINUED | OUTPATIENT
Start: 2024-12-12 | End: 2024-12-13 | Stop reason: HOSPADM

## 2024-12-12 RX ORDER — CEFAZOLIN SODIUM 2 G/100ML
2 INJECTION, SOLUTION INTRAVENOUS
Status: COMPLETED | OUTPATIENT
Start: 2024-12-12 | End: 2024-12-12

## 2024-12-12 RX ORDER — FLUMAZENIL 0.1 MG/ML
0.2 INJECTION, SOLUTION INTRAVENOUS
Status: DISCONTINUED | OUTPATIENT
Start: 2024-12-12 | End: 2024-12-13 | Stop reason: HOSPADM

## 2024-12-12 RX ADMIN — TORSEMIDE 20 MG: 20 TABLET ORAL at 07:49

## 2024-12-12 RX ADMIN — FENTANYL CITRATE 50 MCG: 50 INJECTION, SOLUTION INTRAMUSCULAR; INTRAVENOUS at 17:42

## 2024-12-12 RX ADMIN — CEFAZOLIN SODIUM 2 G: 2 INJECTION, SOLUTION INTRAVENOUS at 17:33

## 2024-12-12 RX ADMIN — IPRATROPIUM BROMIDE AND ALBUTEROL SULFATE 3 ML: .5; 3 SOLUTION RESPIRATORY (INHALATION) at 07:56

## 2024-12-12 RX ADMIN — CARVEDILOL 12.5 MG: 12.5 TABLET, FILM COATED ORAL at 07:49

## 2024-12-12 RX ADMIN — IPRATROPIUM BROMIDE AND ALBUTEROL SULFATE 3 ML: .5; 3 SOLUTION RESPIRATORY (INHALATION) at 20:17

## 2024-12-12 RX ADMIN — CARVEDILOL 12.5 MG: 12.5 TABLET, FILM COATED ORAL at 18:25

## 2024-12-12 RX ADMIN — AMLODIPINE BESYLATE 10 MG: 10 TABLET ORAL at 07:49

## 2024-12-12 RX ADMIN — ACETYLCYSTEINE 4 ML: 200 SOLUTION ORAL; RESPIRATORY (INHALATION) at 07:56

## 2024-12-12 RX ADMIN — ROSUVASTATIN CALCIUM 10 MG: 10 TABLET, FILM COATED ORAL at 21:10

## 2024-12-12 RX ADMIN — NICOTINE 1 PATCH: 21 PATCH, EXTENDED RELEASE TRANSDERMAL at 07:50

## 2024-12-12 RX ADMIN — ACETAMINOPHEN 650 MG: 325 TABLET, FILM COATED ORAL at 21:10

## 2024-12-12 RX ADMIN — ISOSORBIDE MONONITRATE 120 MG: 60 TABLET, EXTENDED RELEASE ORAL at 07:49

## 2024-12-12 RX ADMIN — FLUTICASONE FUROATE AND VILANTEROL TRIFENATATE 1 PUFF: 200; 25 POWDER RESPIRATORY (INHALATION) at 07:50

## 2024-12-12 RX ADMIN — LIDOCAINE HYDROCHLORIDE 15 ML: 10 INJECTION, SOLUTION INFILTRATION; PERINEURAL at 17:59

## 2024-12-12 RX ADMIN — MIDAZOLAM 1 MG: 1 INJECTION INTRAMUSCULAR; INTRAVENOUS at 17:52

## 2024-12-12 RX ADMIN — MIDAZOLAM 1 MG: 1 INJECTION INTRAMUSCULAR; INTRAVENOUS at 17:42

## 2024-12-12 RX ADMIN — ACETYLCYSTEINE 4 ML: 200 SOLUTION ORAL; RESPIRATORY (INHALATION) at 20:17

## 2024-12-12 RX ADMIN — TORSEMIDE 20 MG: 20 TABLET ORAL at 15:25

## 2024-12-12 RX ADMIN — GABAPENTIN 200 MG: 100 CAPSULE ORAL at 21:09

## 2024-12-12 ASSESSMENT — ACTIVITIES OF DAILY LIVING (ADL)
ADLS_ACUITY_SCORE: 55
ADLS_ACUITY_SCORE: 54
ADLS_ACUITY_SCORE: 53
ADLS_ACUITY_SCORE: 53
ADLS_ACUITY_SCORE: 55
ADLS_ACUITY_SCORE: 54
ADLS_ACUITY_SCORE: 55
ADLS_ACUITY_SCORE: 55
ADLS_ACUITY_SCORE: 52
ADLS_ACUITY_SCORE: 53
ADLS_ACUITY_SCORE: 52
ADLS_ACUITY_SCORE: 54
ADLS_ACUITY_SCORE: 54
ADLS_ACUITY_SCORE: 53
ADLS_ACUITY_SCORE: 55
ADLS_ACUITY_SCORE: 55
ADLS_ACUITY_SCORE: 52
ADLS_ACUITY_SCORE: 54
ADLS_ACUITY_SCORE: 54
ADLS_ACUITY_SCORE: 55
ADLS_ACUITY_SCORE: 55

## 2024-12-12 NOTE — PROGRESS NOTES
[facility]     Patient is anticipated to discharge 12/14/24.  Patient agrees to have [facility] provide SN PT services. [facility] will [facility] to schedule an initial home care visit post discharge from the hospital. Anticipated start of care date is [within 24-48 hours of discharge].     Please note: SOC date is based on availability of the day referral was received. If patients discharge date changes, please reach out to Clinical Liaison or the HUB to ensure SOC date is still appropriate for a safe discharge plan.     Thank you for the referral.     LINN Alicea, LPN  [facility] Liaison   Cell: [phone]

## 2024-12-12 NOTE — PRE-PROCEDURE
GENERAL PRE-PROCEDURE:   Procedure:  Tunneled right pleural catheter placement with image guidance  Date/Time:  12/12/2024 12:18 PM    Written consent obtained?: Yes    Risks and benefits: Risks, benefits and alternatives were discussed    Consent given by:  Patient  Patient states understanding of procedure being performed: Yes    Patient's understanding of procedure matches consent: Yes    Procedure consent matches procedure scheduled: Yes    Expected level of sedation:  Moderate  Appropriately NPO:  Yes  ASA Class:  3  Mallampati  :  Grade 2- soft palate, base of uvula, tonsillar pillars, and portion of posterior pharyngeal wall visible  Lungs:  Lungs clear with good breath sounds bilaterally  Heart:  Normal heart sounds and rate  History & Physical reviewed:  History and physical reviewed and no updates needed  Statement of review:  I have reviewed the lab findings, diagnostic data, medications, and the plan for sedation    Vineet Haro PA-C  Interventional Radiology  [phone] (IR)  *[phone] ([facility] Office)

## 2024-12-12 NOTE — CONSULTS
"  Interventional Radiology - Pre-Procedure Note:  12/12/2024    Procedure Requested: Tunneled pleural catheter placement  Requested by: Dr Huizar    Brief HPI: Shirley Hendricks is a 64 year old female w recurrent bilateral pleural effusion requiring several thoracentesis procedures in the past month. Effusion likely 2/2 CHF, CKD. CT surgery has evaluated, not a candidate for pleurodesis given trapped lung. IR consulted for tunneled pleural catheter placement on right side. Pulmonary service also following and concurs w plan.        NPO: YES since MN  ANTICOAGULANTS: On hold for procedure  ANTIBIOTICS: Ancef 2g IV for periprocedural prophylaxis    ALLERGIES  Allergies   Allergen Reactions    Contrast Dye Anaphylaxis     Pt reported facial and throat swelling with prior CT contrast    Pantoprazole      Protonix caused diffuse edema    Chantix [Varenicline]      Terrible dreams    Gluten Meal GI Disturbance     Pt has celiac disease. Can not have gluten    Penicillins Itching and Rash         LABS:  INR   Date Value Ref Range Status   12/04/2024 1.24 (H) 0.85 - 1.15 Final   09/24/2020 1.01 0.86 - 1.14 Final      Hemoglobin   Date Value Ref Range Status   12/11/2024 7.9 (L) 11.7 - 15.7 g/dL Final   07/09/2021 8.8 (L) 11.7 - 15.7 g/dL Final   ]  Platelet Count   Date Value Ref Range Status   12/11/2024 152 150 - 450 10e3/uL Final   07/09/2021 169 150 - 450 10e9/L Final     Creatinine   Date Value Ref Range Status   12/11/2024 1.57 (H) 0.51 - 0.95 mg/dL Final   07/09/2021 0.77 0.52 - 1.04 mg/dL Final     Potassium   Date Value Ref Range Status   12/11/2024 3.9 3.4 - 5.3 mmol/L Final   12/29/2022 4.3 3.4 - 5.3 mmol/L Final   07/09/2021 5.2 3.4 - 5.3 mmol/L Final         EXAM:  BP (!) 158/61 (BP Location: Right arm)   Pulse 53   Temp 97.8  F (36.6  C) (Oral)   Resp 16   Ht 1.575 m (5' 2\")   Wt 50 kg (110 lb 3.7 oz)   LMP  (LMP Unknown)   SpO2 93%   BMI 20.16 kg/m    General:  Stable.  In no acute distress.  "   Neuro:  A&O x 3. Moves all extremities equally.  Heart: RRR  Lungs: No increased work of breathing on O2 via NC      Pre-Sedation Code Status Assessment:  Code Status: Full Code intra procedure, per discussion with patient      ASSESSMENT/PLAN:   Recurrent right pleural effusion 2/2 CHF, CKD.    - Tunneled right pleural catheter placement today w moderate sedation  - Patient to discharge w home health  - Procedure is medically necessary for symptoms management and to prevent recurrent admissions for drainage procedures given frequency of recurrence.   - Per CT surgery, not a candidate for surgical pleurodesis given trapped lung, low likelihood of success.   -Reviewed w Ivana Huizar and Aime, all in agreement w plan    Procedure, risks/benefits, details, alternatives, and sedation reviewed with patient and sister and both verbalized understanding. All questions answered. OK to proceed with above radiology procedure.     Vineet Haro PA-C  Interventional Radiology  *[phone]  [phone]      Total Time: 55 minutes

## 2024-12-12 NOTE — PROGRESS NOTES
[facility]    Medicine Progress Note - Hospitalist Service    Date of Admission:  11/20/2024    Assessment & Plan   Shirley Hendricks is a 64 year old female, on Plavix, with a history of COPD, hypertension, CHF, peripheral artery disease, NSTEMI, stage 4 CKD, and tobacco use who is being admitted for evaluation of shortness of breath.      Acute hypoxic respiratory failure-multifactorial.  Acute exacerbation of heart failure with preserved EF.  Moderate to severe left pleural effusion status post thoracentesis.  -Presents with 2-day history of increasing shortness of breath and a cough.  On admission chest x-ray shows a large left pleural effusion with compressive atelectasis.  There is also diffuse interstitial opacities consistent with pulmonary edema.    -Labs pertinent for a proBNP of 57254   -Patient underwent bilateral thoracentesis initially on 11/20 and the following day with good improvement in her symptoms.  -Fluid consistent with transudate effusion. Cultures negative. Cytology negative for malignancy  -Completed 5 days of corticosteroid  -TTE done during this hospitalization on 11/22/2024 showed LVEF normal at 55 to 60%.  RV function normal.  Mild 1+ MR, 1+ TR.  Mild pulmonary hypertension.  Compared to prior study there is no significant change  -Completed treatment for community-acquired pneumonia with cefuroxime and azithromycin  -Right heart catheterization done on 11/27/24 showed normal pressures, however this was after several  doses of diuretics.  Weight at that time was close to 104 pounds.  -Patient's oxygenation initially improved with aggressive diuretics and thoracentesis, however i patient started getting more hypoxic again on 12/3/2024 to a point of needing up to 5 L nasal cannula.  -Repeat chest x-ray performed on 12/3/2024 shows persistent pulmonary edema with ongoing recurrent bilateral pleural effusion.  Weight has gone up to 122 pounds on  12/5/2024  -Discussed with pulmonary and nephrology .  Received one-time dose of Bumex 2 mg on 12/4/2024  -Underwent repeat right-sided thoracentesis with removal of 1.4 L on 12/4/2024 and left-sided thoracentesis on 12/5/2024 with removal of 1.1 L fluid.  -CT chest without contrast was obtained right after left-sided thoracentesis on 12/5/2024 that continued to show significant atelectasis of the right and left lung bases.  There is concern for trapped lung causing recurrent pleural fluid reaccumulation into the empty space.  CT chest also showed a tiny pneumothorax.  Repeat chest x-ray on 12/6/2024 continues to show bibasilar atelectasis.  Likely secondary to thickened mucus  -Aggressive airway clearance with DuoNebs and Mucomyst ordered.  -Incentive spirometry and Aerobika 10 times an hour while awake.  -Pulmonary ordered for BiPAP twice a day to try and recruit the lung and prevent further pleural effusions.  -Tried chest physiotherapy but did not tolerate well.  Will discontinue for now.  -12/8-significant worsening hypoxia with increasing oxygen needs up to 10 L on oxy mask repeated chest CT that shows recurrent large bilateral pleural effusions with right greater than left.  Repeat ultrasound right thoracentesis with removal of 1500 cc.  Patient felt significantly better after this.  Left-sided thoracentesis being done on 12/9/2024.   - Unfortunately this has been a recurrent problem despite aggressive mucus clearance mechanisms and intermittent BiPAP.  12/10/24-Discussed with pulmonlogist Dr. Vega recommending Pleurx catheter on the right side   -Apixaban evening dose on hold and will consult IR tomorrow for Pluerex catheter placement  -Discussed with pulmonology recommending thoracic surgery consult for trapped lung      12/11/24-  Pulmonology did discuss with the patient and his sister about Pleurx catheter placement.  discussed with interventional radiology and Pleurx catheter tomorrow.  N.p.o. after  midnight.  Apixaban on hold    Plan would be to follow-up with outpatient interventional pulmonology and cardiology to continue diuretics    -Continue diuretics  -Strict intake and output  -    12/12-Plurex  cathether today   Checked with  Neli and has coverage   Appreciate Thorasic surgery and no indication currently for plurodesis      community Acquired Pnuemonia  COPD with possible acute exacerbation  -Patient initially presented with acute hypoxic respiratory failure, initially felt to be due to acute exacerbation of heart failure with preserved EF  -Initially she was diuresed, also received thoracentesis with improvement in oxygenation however now continues to require oxygen at 2-3 L  -Right heart cath showed normal pressures, cardiology feels that there is a significant component of COPD which is contributing to her symptoms and hypoxia  -Already completed 5 days steroid course and a round of antibiotics  -As mentioned above pulmonary following  -Most likely her hypoxia at this point is multifactorial with definite component of heart failure.  COPD is definitely contributing.  -Post repeat bilateral thoracentesis.  -Continue outpatient follow with pulmonology  -Might need nebulizer machine on discharge      Hypertensive urgency  New onset A-fib with RVR  Hyperlipidemia  History of coronary artery disease  Type II NSTEMI likely due to demand due to heart failure exacerbation.  -Initially found to be in A-fib, now converted to normal sinus rhythm  -Continue apixaban, currently on 2.5 mg twice a day based on body weight and renal function  -Prior to admission atenolol changed to carvedilol due to refractory hypertension  -Continue amlodipine 10 mg daily  -Continue Plavix which she takes for both history of coronary artery disease and peripheral arterial disease  -Isosorbide mononitrate increased to 120 mg daily  -Continue Crestor  -Apixaban on hold for procedure     Hyponatremia  -Sodium decreased  from 133 on presentation to 121   -Nephrology following  -Did require 2% saline initially, currently at 133  -Continue 2 L fluid restriction  -Nephrology following  -Daily BMP next  -Sodium 133 on 12/11/2024     Acute kidney injury on CKD stage III  Suspected ATN secondary to hypotension and ARB + diuretic  Borderline hyperkalemia  -Creatinine on admission of 1.71, which worsened with diuresis and relative hypotension.  -Creatinine peaked at 3.78 and now downtrending, good urine output  -Creatinine at 2.41 on 12/4/2024.  Received a diuretic challenge with Bumex 2 mg IV once.  And started on torsemide 20 mg daily which was eventually uptitrated to twice daily.  -Creatinine slowly improving 2.94--141--2.08--2.06-1.95--1.76--1.54.-1.68/-1.57 BUN improving as well.  Nephrology signed off on 12/9/2024.     GERD  -Continue with PTA famotidine     Anemia of Chronic Disease  Baseline hemoglobin has been between 8-9 over the past few months  Hemoglobin currently is 7.9 has been stable in the last 3 days with no active bleeding     Peripheral vascular disease with history of 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 2024  -Continue Plavix and statin  -Hold apixaban for procedure tomorrowHistory of thrombocytopenia  -Platelet count normal on admission, slightly at the moment but stable at 146.     Tobacco use  - Continue with nicotine patch  - Counselled on cessation             Diet: Fluid restriction 1200 ML FLUID  Snacks/Supplements Adult: Margarita Garrison Standard Oral Supplement; Between Meals  NPO per Anesthesia Guidelines for Procedure/Surgery Except for: Meds    DVT Prophylaxis: DOAC  Alvarez Catheter: Not present  Lines: None     Cardiac Monitoring: None  Code Status: Full Code      Clinically Significant Risk Factors         # Hyponatremia: Lowest Na = 133 mmol/L in last 2 days, will monitor as appropriate  # Hypochloremia: Lowest Cl = 95 mmol/L in last 2 days, will monitor as appropriate      # Hypoalbuminemia: Lowest albumin = 3 g/dL at  11/21/2024  4:42 AM, will monitor as appropriate     # Hypertension: Noted on problem list             # Moderate Malnutrition: based on nutrition assessment    # Financial/Environmental Concerns: none         Social Drivers of Health    Tobacco Use: Medium Risk (11/14/2024)    Received from [employer]([facility]    Patient History     Smoking Tobacco Use: Former     Smokeless Tobacco Use: Never     Passive Exposure: Past          Disposition Plan     Medically Ready for Discharge: Anticipated in 2-4 Days             Yoli Huizar MD  Hospitalist Service  [facility]  Securely message with [employer] (more info)  Text page via "[employer]" Paging/Directory   ______________________________________________________________________    Interval History   No acute events   No chest pain  No shortness of breath     Physical Exam   Vital Signs: Temp: 97.8  F (36.6  C) Temp src: Oral BP: (!) 160/64 Pulse: 57   Resp: 18 SpO2: 93 % O2 Device: Nasal cannula with humidification Oxygen Delivery: 2 LPM  Weight: 112 lbs 14.01 oz    Physical Exam  Cardiovascular:      Rate and Rhythm: Normal rate and regular rhythm.      Heart sounds: Normal heart sounds.   Pulmonary:      Effort: Pulmonary effort is normal. No respiratory distress.      Breath sounds: Normal breath sounds.          Medical Decision Making       38 MINUTES SPENT BY ME on the date of service doing chart review, history, exam, documentation & further activities per the note.      Data         Imaging results reviewed over the past 24 hrs:   No results found for this or any previous visit (from the past 24 hours).

## 2024-12-12 NOTE — TELEPHONE ENCOUNTER
"LPN spoke with Shirley and informed her that their appointment on Thursday 1/23/25 with Dr. Robbins was going to be moved to Tuesday.     Pt stated \"That is just fine, but I am currently in the hospital and can't write anything down and I don't want to take a medical ride so I will need to coordinate with my sister.\"    LPN offered to call the pt back at a later time to further assist them to schedule.     Leonor Hooks, ANA MARÍA    "

## 2024-12-12 NOTE — PROGRESS NOTES
SPIRITUAL HEALTH SERVICES  SPIRITUAL ASSESSMENT Progress Note  [facility]. [address] medical specialties     REFERRAL SOURCE: ongoing spiritual/emotional support as arranged with patient    Shirley welcomed brief emotional support as she described a procedure being done today to put in a catheter to drain fluid on her lungs and her anxieties around how close this will be to her heart and the risk of infection.    Shirley welcomed prayer and requested to visit later so she can rest prior to the procedure.    Plan: I will follow up later today or tomorrow as arranged with patient    Gwendolyn Dukes MDiv [facility]  Staff   Please place consult order for routine Spiritual Health Services referrals.  SHS available 24/7 for emergent requests either by having the on-call  paged or by entering an ASAP/STAT consult in Epic (this will also page the on-call ).

## 2024-12-12 NOTE — PROGRESS NOTES
Care Management Follow Up    Length of Stay (days): 22    Expected Discharge Date: 12/14/2024     Concerns to be Addressed: discharge planning     Patient plan of care discussed at interdisciplinary rounds: Yes    Anticipated Discharge Disposition: Home     Anticipated Discharge Services:  Home care SN and PT  Anticipated Discharge DME:  PleurX cath    Patient/family educated on Medicare website which has current facility and service quality ratings:  NA  Education Provided on the Discharge Plan: Yes  Patient/Family in Agreement with the Plan: yes    Referrals Placed by CM/SW:  Home care  Private pay costs discussed: Not applicable    Discussed  Partnership in Safe Discharge Planning  document with patient/family: Yes: Patient     Handoff Completed: Yes, [facility] PCP: Internal handoff referral completed    Additional Information:  Care Coordinator was asked by Maribel Rad to help in finding out if patient had coverage for the PleurX cath and supplies.  After 35 minutes on the phone with [employer] and conversed with Leyla, was told patient would have 100% coverage as long as it is medically necessary and her insurance was told this is medically necessary due to trapped lung and not a pleurodesis candidate.  Patient's insurance also noted her coverage ends Dec 31st.    In discussion with patient on her insurance, she noted her sister works for an  and she was changing to [facility].  Hopefully [facility] will cover her PleurX supplies after the first of the year.    Next Steps:   Care Management is following  Plan home care SN and PT    Neli Simon, RN  Inpatient Care Management  [phone]

## 2024-12-12 NOTE — IR NOTE
Patient Name: Shirley Hendricks  Medical Record Number: [id number]  Today's Date: 12/12/2024    Procedure: Tunneled Chest Tube Placement  Proceduralist: Dr. Salomon  Pathology present: no    Procedure Start: 1742  Procedure end: 1759  Sedation medications administered: Last Dose: 1752, Fentanyl 50 mcg, Versed 2 mg, Lidocaine 15 ml's, Ancef 2 gm.    Report given to: Maria Luisa Horowitz Rn  : no    Other Notes: Pt will require 1 bedrest post procedure. Pt arrived to IR room 1 from 613. Consent reviewed. Pt denies any questions or concerns regarding procedure. Pt positioned supine and monitored per protocol. Pt tolerated procedure without any noted complications.

## 2024-12-12 NOTE — PLAN OF CARE
Goal Outcome Evaluation:                    Summary Acute hypoxic respiratory failure-multifactorial.  Acute exacerbation of heart failure with preserved EF.  Moderate to severe left pleural effusion status post thoracentesis      Hx COPD, hypertension, CHF, peripheral artery disease, NSTEMI, stage 4 CKD     12/11/2024 9378-2789    Orientation A& O x 4    Vitals/Tele VSS on 2 L humidified O2     Pain managed with Tylenol     IV Access/drains PIV SL     Diet Cardiac w/ FL restriction     Mobility Pivot to commode. Weight shift in bed, wheel chair baseline ( R AKA)     GI/ PW in place good UOP, BM x 1 up to bedside commode     Wound/Skin Pale scattered bruises, blanchable redness to coccyx, mepilex on. Band aids on back from thoracentesis sites , nicotine patch on shoulder    Consults /Test/ Procedure - PleurX catheters tomorrow    Cardiothoracic Surgery, Pulmonology, Respiratory Therapy       Discharge Plan Pending       See Flow sheets for assessment

## 2024-12-12 NOTE — PLAN OF CARE
SUMMARY: Admitted for evaluation of SOB. Multifactorial- COPD, PNA, CHF. S/p thoracentesis for effusion.     DATE & TIME: 12/12/2024 6614-6236  Cognitive Concerns/ Orientation : A&O x 4  BEHAVIOR & AGGRESSION TOOL COLOR: Green   ABNL VS/O2: BP elevated- improved with scheduled meds (PRN if >180). Other VSS on O2 2-3 LPM via nasal with humidification, wean as able. Unable to wean today, had to turn up to 3 L as sats <88  MOBILITY: Pivots to BSC.  Right AKA. Repositions self in bed.  PAIN MANAGMENT: denies  DIET: cardiac, 1200 fluid restriction, was NPO until supper time for procedure  BOWEL/BLADDER: Intermittent incontinence, pure wick in place. No BM this shift    ABNL LAB/BG: none new, last: Na 133. Hgb 7.9. Creat 1.57 improving  DRAIN/DEVICES: PIV SL   SKIN: Dusky, blanchable redness to coccyx, mepilex on. Dressings to thoracentesis sites, CDI  TESTS/PROCEDURES: Pleurx catheter placed this evening (right side)  D/C DATE: TBD pending improvement 2+ days, lives at home with family  OTHER IMPORTANT INFO: On scheduled nebs, refusing BIPAP and chest physiotherapy. Nephrology signed off and Pulmonology following. Thoracic surgery consulted-agree with Pleurx.

## 2024-12-12 NOTE — PLAN OF CARE
SUMMARY: Admitted for evaluation of SOB. Multifactorial- COPD, PNA, CHF. S/p thoracentesis for effusion.     DATE & TIME: 12/11/2024, 7086 - 6936  Cognitive Concerns/ Orientation : A&O x 4  BEHAVIOR & AGGRESSION TOOL COLOR: Green   ABNL VS/O2: BP elevated. Other VSS on O2 2 LPM via nasal with humidification  MOBILITY: Pivots to BSC.  Right AKA. Repositions self in bed.  PAIN MANAGMENT: Declined intervention for mild back pain  DIET: NPO at midnight  BOWEL/BLADDER: Intermittent incontinence, pure wick in place. No BM this shift    ABNL LAB/BG: AM labs pending  DRAIN/DEVICES: PIV SL   SKIN: Dusky, blanchable redness to coccyx, mepilex on. Dressings to thoracentesis sites, CDI  TESTS/PROCEDURES: Possible Pleurx catheter placement, IR consulted  D/C DATE: TBD pending improvement 2+ days, lives at home with family  OTHER IMPORTANT INFO: On scheduled nebs, refusing BIPAP and chest physiotherapy. Nephrology signed off and Pulmonology following. Thoracic surgery consulted-agree with Pleurx.

## 2024-12-13 VITALS
BODY MASS INDEX: 20.2 KG/M2 | TEMPERATURE: 97.8 F | DIASTOLIC BLOOD PRESSURE: 73 MMHG | HEART RATE: 62 BPM | OXYGEN SATURATION: 94 % | SYSTOLIC BLOOD PRESSURE: 163 MMHG | WEIGHT: 109.79 LBS | HEIGHT: 62 IN | RESPIRATION RATE: 16 BRPM

## 2024-12-13 LAB
ANION GAP SERPL CALCULATED.3IONS-SCNC: 9 MMOL/L (ref 7–15)
BUN SERPL-MCNC: 69.7 MG/DL (ref 8–23)
CALCIUM SERPL-MCNC: 8.1 MG/DL (ref 8.8–10.4)
CHLORIDE SERPL-SCNC: 98 MMOL/L (ref 98–107)
CREAT SERPL-MCNC: 1.53 MG/DL (ref 0.51–0.95)
EGFRCR SERPLBLD CKD-EPI 2021: 37 ML/MIN/1.73M2
ERYTHROCYTE [DISTWIDTH] IN BLOOD BY AUTOMATED COUNT: 14.8 % (ref 10–15)
GLUCOSE SERPL-MCNC: 73 MG/DL (ref 70–99)
HCO3 SERPL-SCNC: 31 MMOL/L (ref 22–29)
HCT VFR BLD AUTO: 25.3 % (ref 35–47)
HGB BLD-MCNC: 8.3 G/DL (ref 11.7–15.7)
MCH RBC QN AUTO: 27.7 PG (ref 26.5–33)
MCHC RBC AUTO-ENTMCNC: 32.8 G/DL (ref 31.5–36.5)
MCV RBC AUTO: 84 FL (ref 78–100)
PLATELET # BLD AUTO: 146 10E3/UL (ref 150–450)
POTASSIUM SERPL-SCNC: 4.7 MMOL/L (ref 3.4–5.3)
RBC # BLD AUTO: 3 10E6/UL (ref 3.8–5.2)
SODIUM SERPL-SCNC: 138 MMOL/L (ref 135–145)
WBC # BLD AUTO: 3.5 10E3/UL (ref 4–11)

## 2024-12-13 PROCEDURE — 250N000013 HC RX MED GY IP 250 OP 250 PS 637: Performed by: STUDENT IN AN ORGANIZED HEALTH CARE EDUCATION/TRAINING PROGRAM

## 2024-12-13 PROCEDURE — 94640 AIRWAY INHALATION TREATMENT: CPT

## 2024-12-13 PROCEDURE — 85018 HEMOGLOBIN: CPT | Performed by: STUDENT IN AN ORGANIZED HEALTH CARE EDUCATION/TRAINING PROGRAM

## 2024-12-13 PROCEDURE — 250N000013 HC RX MED GY IP 250 OP 250 PS 637: Performed by: INTERNAL MEDICINE

## 2024-12-13 PROCEDURE — 999N000157 HC STATISTIC RCP TIME EA 10 MIN

## 2024-12-13 PROCEDURE — 36415 COLL VENOUS BLD VENIPUNCTURE: CPT | Performed by: STUDENT IN AN ORGANIZED HEALTH CARE EDUCATION/TRAINING PROGRAM

## 2024-12-13 PROCEDURE — 250N000009 HC RX 250: Performed by: STUDENT IN AN ORGANIZED HEALTH CARE EDUCATION/TRAINING PROGRAM

## 2024-12-13 PROCEDURE — 94640 AIRWAY INHALATION TREATMENT: CPT | Mod: 76

## 2024-12-13 PROCEDURE — 99239 HOSP IP/OBS DSCHRG MGMT >30: CPT | Performed by: STUDENT IN AN ORGANIZED HEALTH CARE EDUCATION/TRAINING PROGRAM

## 2024-12-13 PROCEDURE — 80048 BASIC METABOLIC PNL TOTAL CA: CPT | Performed by: INTERNAL MEDICINE

## 2024-12-13 RX ORDER — POLYETHYLENE GLYCOL 3350 17 G/17G
17 POWDER, FOR SOLUTION ORAL DAILY PRN
Qty: 30 PACKET | Refills: 0 | Status: SHIPPED | OUTPATIENT
Start: 2024-12-13 | End: 2025-01-12

## 2024-12-13 RX ORDER — IPRATROPIUM BROMIDE AND ALBUTEROL SULFATE 2.5; .5 MG/3ML; MG/3ML
3 SOLUTION RESPIRATORY (INHALATION) EVERY 6 HOURS PRN
Qty: 360 ML | Refills: 0 | Status: SHIPPED | OUTPATIENT
Start: 2024-12-13 | End: 2025-01-12

## 2024-12-13 RX ORDER — HYDRALAZINE HYDROCHLORIDE 50 MG/1
50 TABLET, FILM COATED ORAL 3 TIMES DAILY PRN
Status: SHIPPED
Start: 2024-12-13

## 2024-12-13 RX ORDER — NICOTINE 21 MG/24HR
1 PATCH, TRANSDERMAL 24 HOURS TRANSDERMAL DAILY
Qty: 60 PATCH | Refills: 0 | Status: SHIPPED | OUTPATIENT
Start: 2024-12-14 | End: 2025-02-12

## 2024-12-13 RX ORDER — TORSEMIDE 20 MG/1
20 TABLET ORAL
Qty: 60 TABLET | Refills: 0 | Status: SHIPPED | OUTPATIENT
Start: 2024-12-13 | End: 2025-01-12

## 2024-12-13 RX ADMIN — OXYCODONE HYDROCHLORIDE 10 MG: 5 TABLET ORAL at 02:51

## 2024-12-13 RX ADMIN — ACETAMINOPHEN 1000 MG: 500 TABLET, FILM COATED ORAL at 08:06

## 2024-12-13 RX ADMIN — IPRATROPIUM BROMIDE AND ALBUTEROL SULFATE 3 ML: .5; 3 SOLUTION RESPIRATORY (INHALATION) at 11:12

## 2024-12-13 RX ADMIN — CLOPIDOGREL BISULFATE 75 MG: 75 TABLET ORAL at 13:29

## 2024-12-13 RX ADMIN — IPRATROPIUM BROMIDE AND ALBUTEROL SULFATE 3 ML: .5; 3 SOLUTION RESPIRATORY (INHALATION) at 08:09

## 2024-12-13 RX ADMIN — CARVEDILOL 12.5 MG: 12.5 TABLET, FILM COATED ORAL at 16:40

## 2024-12-13 RX ADMIN — IPRATROPIUM BROMIDE AND ALBUTEROL SULFATE 3 ML: .5; 3 SOLUTION RESPIRATORY (INHALATION) at 15:09

## 2024-12-13 RX ADMIN — NICOTINE 1 PATCH: 21 PATCH, EXTENDED RELEASE TRANSDERMAL at 08:01

## 2024-12-13 RX ADMIN — CARVEDILOL 12.5 MG: 12.5 TABLET, FILM COATED ORAL at 08:01

## 2024-12-13 RX ADMIN — ACETAMINOPHEN 650 MG: 325 TABLET, FILM COATED ORAL at 02:51

## 2024-12-13 RX ADMIN — ISOSORBIDE MONONITRATE 120 MG: 60 TABLET, EXTENDED RELEASE ORAL at 08:01

## 2024-12-13 RX ADMIN — ACETYLCYSTEINE 4 ML: 200 SOLUTION ORAL; RESPIRATORY (INHALATION) at 08:09

## 2024-12-13 RX ADMIN — APIXABAN 2.5 MG: 2.5 TABLET, FILM COATED ORAL at 16:40

## 2024-12-13 RX ADMIN — AMLODIPINE BESYLATE 10 MG: 10 TABLET ORAL at 08:01

## 2024-12-13 RX ADMIN — TORSEMIDE 20 MG: 20 TABLET ORAL at 08:01

## 2024-12-13 RX ADMIN — TORSEMIDE 20 MG: 20 TABLET ORAL at 16:38

## 2024-12-13 RX ADMIN — FLUTICASONE FUROATE AND VILANTEROL TRIFENATATE 1 PUFF: 200; 25 POWDER RESPIRATORY (INHALATION) at 08:02

## 2024-12-13 ASSESSMENT — ACTIVITIES OF DAILY LIVING (ADL)
ADLS_ACUITY_SCORE: 52

## 2024-12-13 NOTE — PROGRESS NOTES
Brief Pulmonary Note    Chart reviewed. Pt on RA. PleurX placed yesterday by IR. Pt should follow up with interventional pulmonology as an outpatient. Pulmonary will sign off.     Jacoby Torres MD  Pulmonary Disease and Critical Care Medicine  [facility]

## 2024-12-13 NOTE — PLAN OF CARE
Goal Outcome Evaluation:  SUMMARY: Admitted for evaluation of SOB. Multifactorial- COPD, PNA, CHF. S/p thoracentesis for effusion.     DATE & TIME: 12/12-12/13/24 4453-6617  Cognitive Concerns/ Orientation : A&O x 4  BEHAVIOR & AGGRESSION TOOL COLOR: Green   ABNL VS/O2: VSS on 2-3LPM via nasal with humidification; BP shcheduled meds; has PRN if >180 available. Unable to wean last night.  MOBILITY: Pivots to BSC.  Right AKA. Turned and repositioned throughout night  PAIN MANAGMENT: C/O of severe pain. Given Tylenol x2 and 10mg of oxycodone x1.  DIET: cardiac, 1200 fluid restriction  BOWEL/BLADDER: Intermittent incontinence, pure wick in place. No BM this shift    ABNL LAB/BG: none new, last: Na 133. Hgb 7.9. Creat 1.57 improving  DRAIN/DEVICES: PIV SL   SKIN: Dusky, blanchable redness to coccyx, mepilex on. Dressings to thoracentesis sites, CDI. No hematoma noted.   TESTS/PROCEDURES: Pleurx catheter placed yesterday   D/C DATE: TBD pending improvement 2+ days, lives at home with family  OTHER IMPORTANT INFO: On scheduled nebs, refusing BIPAP and chest physiotherapy. Nephrology signed off and Pulmonology following.

## 2024-12-13 NOTE — PROGRESS NOTES
"SPIRITUAL HEALTH SERVICES  SPIRITUAL ASSESSMENT Progress Note  [facility]. [address] medical specialties     REFERRAL SOURCE: follow up as arranged with patient    Brief visit with Shirley today who updated about the procedure yesterday (successful, some pain and needed medications in order to sleep), and her \"exciting news\" that she will discharge this afternoon.     Shirley chose to end the visit as respiratory arrived for cares; and welcomed a prayer of blessing.    No plans to follow up due to patient discharge.    Gwendolyn Dukes MDiv [facility]  Staff   Please place consult order for routine Spiritual Health Services referrals.  SHS available 24/7 for emergent requests either by having the on-call  paged or by entering an ASAP/STAT consult in Epic (this will also page the on-call ).    "

## 2024-12-13 NOTE — PROGRESS NOTES
Care Management Discharge Note    Discharge Date: 12/13/2024       Discharge Disposition: Home    Discharge Services:  [facility] for SN and PT    Discharge DME:  PleurX catheter and supplies, oxygen and a nebulizer    Discharge Transportation: family or friend will provide    Private pay costs discussed: Not applicable    Does the patient's insurance plan have a 3 day qualifying hospital stay waiver?  Yes     Which insurance plan 3 day waiver is available? Alternative insurance waiver    Will the waiver be used for post-acute placement? No    PAS Confirmation Code:  NA  Patient/family educated on Medicare website which has current facility and service quality ratings:  NA    Education Provided on the Discharge Plan: Yes  Persons Notified of Discharge Plans: Patient, Nsg  Patient/Family in Agreement with the Plan: yes    Handoff Referral Completed: Yes, [facility] PCP: Internal handoff referral completed    Additional Information:  Patient is discharging home today  PleurX catheter was placed yesterday.  Dr Huizar felt with the current diuretics patient is on, that patient could wait until Monday for home care to see and work with patient on draining the catheter.  CC reached out to see if Dr Benoit would be the provider to follow with the pleurX catheter.  He has declined due to not having much familiarity with such.  Dr Jacoby Torres, Pulmonary, has agreed to be the prescriber until patient establishes care with Interventional Pulmonology.  A referral has been placed for this.  Patient is going home with new oxygen and a nebulizer and both have been delivered to her room.  [facility] has accepted for SN and PT.  Patient was a little hesitant on agreeing for home PT, as she thought her blind brother would be able to help her get stronger.  Patient reported she has a prosthetic leg, but it is new and she did not want this in the hospital due to her weakness.  Nsg reports good upper body strength to  "transfer from bed to chair on her own.    Patient had asked to have her PCP follow up virtually due to transportation issues.  This has been scheduled and are asking home care to draw her labs.  Patient has the following appointments scheduled:  DEC    18 ED/Hospital Follow Up with Vasquez Benoit  Wednesday Dec 18, 2024 4:55 PM  This is a video visit.    There is no need to come to the facility. Please join the video connection by your Arrive By time.  Please note that if you join more than 10 minutes after this time, this MAY result in re-scheduling of your appointment.       DEC    20 Return Cardiology with Mary Silva  Friday Dec 20, 2024 12:30 PM [facility] [place] [id number]  [facility] [id number] [phone]   JAN 23 2025 Amputee Evaluation with Jacobo Robbins  Thursday Jan 23, 2025 11:00 AM  Appointment Details:      Please wear clothing that allows you to move freely and a supportive shoe.     Please bring the following items with you to your first visit:   Completed health-history and/or questionnaires included in this packet.   - Insurance Card(s)   - Personal Identification  - Prosthesis (if you have one)  - Walker or other mobility device     Cancellations:   A 24-hour notice to our clinic is requested for all cancellations. Please call the clinic to cancel or reschedule your appointment     If you arrive more than 15 minutes late to your appointment, you will be asked to reschedule.  [facility] [address]  [facility] [address]  [phone]   FEB 26 2025 Annual Wellness Visit with Vasquez Benoit  Wednesday Feb 26, 2025 1:40 PM  Please plan to arrive at the clinic at your \"Arrive By\" time for your appointment. Our late policy will be enforced based on this time.  The purpose of this visit is to discuss your medical history and prevent health problems before " you are sick. If you have additional concerns you would like to discuss outside of preventive care, you may be billed for that service.  If you have further questions, you may want to contact your insurance company to understand what is included in your preventive health benefits. [address] [address] [phone]     Patient's family will be transporting home.  PleurX catheter supply order was faxed to Bonita Simon RN  Inpatient Care Management  [phone]

## 2024-12-13 NOTE — DISCHARGE SUMMARY
[facility]  Hospitalist Discharge Summary      Date of Admission:  11/20/2024  Date of Discharge:  12/13/2024  6:39 PM  Discharging Provider: Yoli Huizar MD  Discharge Service: Hospitalist Service    Discharge Diagnoses   Acute hypoxic respiratory failure-multifactorial.  Acute exacerbation of heart failure with preserved EF.  Moderate to severe left pleural effusion status post thoracentesis.  -Likely trapped lung with recurrent pleural effusions bilaterally s/p Pleurx catheter placement for trapped lung on the right side    Clinically Significant Risk Factors     # Moderate Malnutrition: based on nutrition assessment      Follow-ups Needed After Discharge   Follow-up Appointments       Follow-up and recommended labs and tests       Follow up with primary care provider, Vasquez Benoit, within 7 days for hospital follow- up.  The following labs/tests are recommended: cbc and bmp    Follow with Cardiology further management of heart failure     follow-up with pulmonology for further management of Plurex catheter.            {  Unresulted Labs Ordered in the Past 30 Days of this Admission       No orders found from 10/21/2024 to 11/21/2024.          Discharge Disposition   Discharged to home  Condition at discharge: Stable    Hospital Course      Shirley Hendricks is a 64 year old female, on Plavix, with a history of COPD, hypertension, CHF, peripheral artery disease, NSTEMI, stage 4 CKD, and tobacco use who is being admitted for evaluation of shortness of breath.      Acute hypoxic respiratory failure-multifactorial.  Acute exacerbation of heart failure with preserved EF.  Moderate to severe left pleural effusion status post thoracentesis.  -Likely trapped lung with recurrent pleural effusions bilaterally s/p Pleurx catheter placement for trapped lung on the right side  -Presents with 2-day history of increasing shortness of breath and a cough.  On admission chest x-ray  shows a large left pleural effusion with compressive atelectasis.  There is also diffuse interstitial opacities consistent with pulmonary edema.    -Labs pertinent for a proBNP of 96821   -Patient underwent bilateral thoracentesis initially on 11/20 and the following day with good improvement in her symptoms.  -Fluid consistent with transudate effusion. Cultures negative. Cytology negative for malignancy  -Completed 5 days of corticosteroid  -TTE done during this hospitalization on 11/22/2024 showed LVEF normal at 55 to 60%.  RV function normal.  Mild 1+ MR, 1+ TR.  Mild pulmonary hypertension.  Compared to prior study there is no significant change  -Completed treatment for community-acquired pneumonia with cefuroxime and azithromycin  -Right heart catheterization done on 11/27/24 showed normal pressures, however this was after several  doses of diuretics.  Weight at that time was close to 104 pounds.  -Patient's oxygenation initially improved with aggressive diuretics and thoracentesis, however ipatient started getting more hypoxic again on 12/3/2024 to a point of needing up to 5 L nasal cannula.  -Repeat chest x-ray performed on 12/3/2024 shows persistent pulmonary edema with ongoing recurrent bilateral pleural effusion.  Weight has gone up to 122 pounds on 12/5/2024  -Discussed with pulmonary and nephrology .  Received one-time dose of Bumex 2 mg on 12/4/2024  -Underwent repeat right-sided thoracentesis with removal of 1.4 L on 12/4/2024 and left-sided thoracentesis on 12/5/2024 with removal of 1.1 L fluid.  -CT chest without contrast was obtained right after left-sided thoracentesis on 12/5/2024 that continued to show significant atelectasis of the right and left lung bases.  There is concern for trapped lung causing recurrent pleural fluid reaccumulation into the empty space.  CT chest also showed a tiny pneumothorax.  Repeat chest x-ray on 12/6/2024 continues to show bibasilar atelectasis.  Likely secondary  to thickened mucus  -Aggressive airway clearance with DuoNebs and Mucomyst ordered.  -Incentive spirometry and Aerobika 10 times an hour while awake.  -Pulmonary ordered for BiPAP twice a day to try and recruit the lung and prevent further pleural effusions.  -Tried chest physiotherapy but did not tolerate well.  Will discontinue for now.  -12/8-significant worsening hypoxia with increasing oxygen needs up to 10 L on oxy mask repeated chest CT that shows recurrent large bilateral pleural effusions with right greater than left.  Repeat ultrasound right thoracentesis with removal of 1500 cc.  Patient felt significantly better after this.  Left-sided thoracentesis being done on 12/9/2024.   - Unfortunately this has been a recurrent problem despite aggressive mucus clearance mechanisms and intermittent BiPAP.  12/10/24-Discussed with pulmonlogist Dr. Vega recommending Pleurx catheter on the right side   - 12/11/24-  Pulmonology did discuss with the patient and his sister about Pleurx catheter placement.  discussed with interventional radiology and Pleurx catheter tomorrow.    12/12-Plurex  cathether  Checked with  Neli and has coverage   Appreciate Thorasic surgery and no indication currently for plurodesis   12/13  -patient discharged home on 2 L of oxygen oxygen assessment done  -Home nursing has been arranged for the patient to help with Pleurx catheter drainage and education and PT  -Patient can use Pleurx catheter as needed to prevent further buildup of fluid  -Continue diuretic dosing torsemide 20 mg twice daily with outpatient follow-up with interventional pulmonology, nephrology and also primary care provider  -Outpatient follow-up with cardiology  -Labs to be drawn by home nurse and follow-up with primary care and further diuretic dosing adjustment based on volume status  -Discussed with nephrology on the day of discharge will continue the current torsemide dosing   -Nebulization machine and  oxygen arranged on discharge       community Acquired Pnuemonia  COPD with possible acute exacerbation  -Patient initially presented with acute hypoxic respiratory failure, initially felt to be due to acute exacerbation of heart failure with preserved EF  -Initially she was diuresed, also received thoracentesis with improvement in oxygenation however now continues to require oxygen at 2-3 L  -Right heart cath showed normal pressures, cardiology feels that there is a significant component of COPD which is contributing to her symptoms and hypoxia  -Already completed 5 days steroid course and a round of antibiotics  -As mentioned above pulmonary following  -Most likely her hypoxia at this point is multifactorial with definite component of heart failure.  COPD is definitely contributing.  -See above management      Hypertensive urgency  New onset A-fib with RVR  Hyperlipidemia  History of coronary artery disease  Type II NSTEMI likely due to demand due to heart failure exacerbation.  -Initially found to be in A-fib, now converted to normal sinus rhythm  -Continue apixaban, currently on 2.5 mg twice a day based on body weight and renal function  -Prior to admission atenolol changed to carvedilol due to refractory hypertension  -Continue amlodipine 10 mg daily  -Continue Plavix which she takes for both history of coronary artery disease and peripheral arterial disease  -Isosorbide mononitrate increased to 120 mg daily  -Continue Crestor  -Continue apixaban  -Patient has blood pressure monitor at home and asked patient to take hydralazine as needed      Hyponatremia  -Sodium decreased from 133 on presentation to 121   -Nephrology following  -Did require 2% saline initially, currently at 133  -Continue 2 L fluid restriction  -Nephrology following  -Daily BMP next  -Sodium 133 on 12/11/2024     Acute kidney injury on CKD stage III  Suspected ATN secondary to hypotension and ARB + diuretic  Borderline  hyperkalemia  -Creatinine on admission of 1.71, which worsened with diuresis and relative hypotension.  -Creatinine peaked at 3.78 and now downtrending, good urine output  -Creatinine at 2.41 on 12/4/2024.  Received a diuretic challenge with Bumex 2 mg IV once.  And started on torsemide 20 mg daily which was eventually uptitrated to twice daily.  -Creatinine slowly improving 2.94--141--2.08--2.06-1.95--1.76--1.54.-1.68/-1.57 BUN improving as well.  Nephrology signed off on 12/9/2024  -Outpatient follow-up with repeat labs.     GERD  -Continue with PTA famotidine     Anemia of Chronic Disease  Baseline hemoglobin has been between 8-9 over the past few months  Hemoglobin currently is 7.9 has been stable in the last 3 days with no active bleeding     Peripheral vascular disease with history of 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 2024  -Continue Plavix and statin  -Hold apixaban for procedure tomorrowHistory of thrombocytopenia  -Platelet count normal on admission, slightly at the moment but stable at 146.     Tobacco use  - Continue with nicotine patch  - Counselled on cessation  Consultations This Hospital Stay   INTERVENTIONAL RADIOLOGY ADULT/PEDS IP CONSULT  CARDIOLOGY IP CONSULT  CARE MANAGEMENT / SOCIAL WORK IP CONSULT  PULMONARY IP CONSULT  PHARMACY IP CONSULT  VASCULAR ACCESS ADULT IP CONSULT  PHARMACY LIAISON FOR MEDICATION COVERAGE CONSULT  PHARMACY IP CONSULT  NEPHROLOGY IP CONSULT  PHARMACY LIAISON FOR MEDICATION COVERAGE CONSULT  PULMONARY IP CONSULT  PHARMACY IP CONSULT  PHYSICAL THERAPY ADULT IP CONSULT  OCCUPATIONAL THERAPY ADULT IP CONSULT  VASCULAR ACCESS ADULT IP CONSULT  SPIRITUAL HEALTH SERVICES IP CONSULT  PSYCHIATRY IP CONSULT  THORACIC SURGERY IP CONSULT  INTERVENTIONAL RADIOLOGY ADULT/PEDS IP CONSULT  SMOKING CESSATION PROGRAM IP CONSULT    Code Status   Full Code    Time Spent on this Encounter   I, Yoli Huizar MD, personally saw the patient today and spent greater than 30 minutes discharging  this patient.       Yoli Huizar MD  [place] [address] [facility]  [address] [place] [address]  Phone: [phone]  ______________________________________________________________________    Physical Exam   Vital Signs: Temp: 97.8  F (36.6  C) Temp src: Oral BP: (!) 163/67 Pulse: 55   Resp: 16 SpO2: 94 % O2 Device: Nasal cannula Oxygen Delivery: 2 LPM  Weight: 109 lbs 12.63 oz  Physical Exam  Cardiovascular:      Rate and Rhythm: Normal rate and regular rhythm.      Heart sounds: Normal heart sounds.   Pulmonary:      Effort: Pulmonary effort is normal. No respiratory distress.      Breath sounds: Normal breath sounds.   Abdominal:      General: There is no distension.      Palpations: Abdomen is soft.      Tenderness: There is no abdominal tenderness.   Musculoskeletal:      Left lower leg: No edema.             Primary Care Physician   Vasquez Benoit    Discharge Orders      Med Therapy Management Referral      Primary Care - Care Coordination Referral      Adult Pulmonary Medicine  Referral      Adult Cardiology Eval  Referral      Reason for your hospital stay    Acute Hypoxic respiratory failure, Acute on Chronic Heart failure, Acute kidney injury, Trapped lung     Follow-up and recommended labs and tests     Follow up with primary care provider, Vasquez Benoit, within 7 days for hospital follow- up.  The following labs/tests are recommended: cbc and bmp    Follow with Cardiology further management of heart failure     follow-up with pulmonology for further management of Plurex catheter.     Activity    Your activity upon discharge: activity as tolerated     Nebulizer and Supplies Order for DME - ONLY FOR DME     Oxygen Adult/Peds    Oxygen Documentation  I certify that this patient, Shirley Hendricks has been under my care (or a nurse practitioner or physican's assistant working with me). This is the face-to-face encounter for oxygen medical  necessity.      At the time of this encounter, I have reviewed the qualifying testing and have determined that supplemental oxygen is reasonable and necessary and is expected to improve the patient's condition in a home setting.         Patient has continued oxygen desaturation due to Chronic Heart Failure I50  COPD J44.9.    If portability is ordered, is the patient mobile within the home? yes    Was this visit performed as a telehealth visit: No     Diet    Follow this diet upon discharge: Current Diet:Orders Placed This Encounter      Fluid restriction 1500 ML FLUID      Snacks/Supplements Adult: Margarita Garrison Standard Oral Supplement; Between Meals      Combination Diet Low Saturated Fat Na <2400mg Diet       Significant Results and Procedures   Most Recent 3 CBC's:  Recent Labs   Lab Test 12/13/24  0650 12/12/24  2304 12/11/24  0724   WBC 3.5* 4.0 3.6*   HGB 8.3* 8.2* 7.9*   MCV 84 83 83   * 148* 152     Most Recent 3 BMP's:  Recent Labs   Lab Test 12/13/24  0650 12/12/24  2304 12/11/24  0724    136 133*   POTASSIUM 4.7 4.0 3.9   CHLORIDE 98 96* 95*   CO2 31* 33* 29   BUN 69.7* 72.4* 85.9*   CR 1.53* 1.53* 1.57*   ANIONGAP 9 7 9   SONIA 8.1* 7.9* 8.1*   GLC 73 163* 81   ,   Results for orders placed or performed during the hospital encounter of 11/20/24   XR Chest Port 1 View    Narrative    EXAM: XR CHEST PORT 1 VIEW  LOCATION: [facility]  DATE: 11/20/2024    INDICATION: Shortness of breath, CHF vs COPD  COMPARISON: CT chest 7/31/2024, chest radiograph 7/30/2024      Impression    IMPRESSION: Large right, moderate sized left pleural effusions with compressive atelectasis. Diffuse interstitial opacities suggest pulmonary edema. No pneumothorax. Cardiac silhouette and mediastinal contours are unchanged where seen. Aortic arch   calcifications.   US Thoracentesis    Narrative    EXAM:   1. RIGHT THORACENTESIS  2. ULTRASOUND GUIDANCE  LOCATION: [facility]  DATE: 11/20/2024    INDICATION: Pleural effusion.    PROCEDURE: Informed consent obtained. Time out performed. The chest was prepped and draped in sterile fashion. 15 mL of 1 % lidocaine was infused into the local soft tissues. Under direct ultrasound guidance, a centesis catheter system was placed into   the pleural effusion.     1.1 liters of straw-colored fluid were removed and sent to lab, if requested.    Patient tolerated procedure well.    Ultrasound imaging was obtained and placed in the patient's permanent medical record.      Impression    IMPRESSION:  Status post ultrasound-guided right thoracentesis.    Reference CPT Code: 37770   XR Chest Port 1 View    Narrative    CHEST ONE VIEW  11/21/2024 8:23 AM     HISTORY: hypoxia    COMPARISON: Chest radiograph 11/20/2024.      Impression    IMPRESSION: Decreased small to moderate right and small left pleural  effusions with adjacent atelectasis. Mild interstitial pulmonary  edema. Slightly more confluent left perihilar opacity may reflect  alveolar edema. No convincing pneumothorax. Similar cardiomediastinal  silhouette.    SILVIO COOK MD         SYSTEM ID:  [id number]   CT Chest w/o Contrast    Narrative    CT CHEST WITHOUT CONTRAST  11/21/2024 9:49 AM    CLINICAL HISTORY: Asses for pneumonia, edema, pleural effusion.    TECHNIQUE: CT chest without IV contrast. Multiplanar reformats were  obtained. Dose reduction techniques were used.  CONTRAST: None.    COMPARISON: Chest radiograph 11/21/2024.    FINDINGS:   LUNGS AND PLEURA: Decreased small to moderate right pleural effusion  with adjacent atelectasis. New patchy consolidative opacity within the  right middle lobe and lower lobe with air bronchograms. Similar  moderate left pleural effusion with complete atelectasis of the left  lower lobe. No pneumothorax. Mild interstitial pulmonary edema.    MEDIASTINUM/AXILLAE: Prominent mediastinal lymph nodes may be  reactive. No thoracic aortic  aneurysm. Hypodense thyroid nodules  measuring up to 1.1 cm. Trace pericardial fluid.    CORONARY ARTERY CALCIFICATION: Severe.    UPPER ABDOMEN: Trace perisplenic ascites. Cholecystectomy.    MUSCULOSKELETAL: No aggressive osseous lesion. Remote left rib  fracture.      Impression    IMPRESSION:   1.  Small to moderate right pleural effusion with adjacent  atelectasis. New patchy consolidative opacity within the right middle  lobe and lower lobe concerning for superimposed pneumonia with  possible component of reexpansion edema.  2.  Moderate left pleural effusion with complete atelectasis of the  left lower lobe.  3.  Mild interstitial pulmonary edema.    SILVIO COOK MD         SYSTEM ID:  [id number]   US Lower Extremity Venous Duplex Bilateral    Narrative    VENOUS ULTRASOUND BILATERAL LEG(S)  11/21/2024 4:09 PM     HISTORY: DVT. History of right above-knee amputation.    COMPARISON: 7/30/2024    FINDINGS: Examination of the deep veins with graded compression and  color flow Doppler with spectral wave form analysis was performed.     Left: Images show no evidence of thrombus in the bilateral common  femoral vein, femoral vein, popliteal vein or calf veins. Where  imaged, the great saphenous vein is patent.    Right: Right common femoral, profunda femoral, proximal femoral, mid  femoral veins are patent without evidence of deep vein thrombosis.  Where imaged, the great saphenous vein is patent.      Impression    IMPRESSION: No deep vein thrombosis in either lower extremity.    GHASSAN COLLAZO DO         SYSTEM ID:  [id number]   US Thoracentesis    Narrative    ULTRASOUND GUIDED THORACENTESIS  11/21/2024 4:04 PM     HISTORY: B/l pleural effusion- Please do b/l thoracnetesis    FINDINGS: Ultrasound was used to evaluate for the presence and best  approach for drainage of a pleural effusion. Written and oral informed  consent was obtained. A pause for the cause procedure to verify the  correct patient and  correct procedure. The skin overlying the left  chest posteriorly was prepped and draped in the usual sterile fashion.  The subcutaneous tissues were anesthetized with 10 mL of 1 percent  lidocaine injected. A catheter was advanced into the pleural space and  1200 mL of  yellow colored fluid was drained. Ultrasound images were  permanently stored.  There were no immediate complications. Patient  left the ultrasound suite in satisfactory condition.    Right thoracentesis performed yesterday evening. Imaging of the right  pleural space demonstrated persistent small-to-moderate right pleural  effusion. Patient did not want to proceed with right-sided  thoracentesis at this time.      Impression    IMPRESSION: Technically successful left thoracentesis without  immediate complications.    SILVIO COOK MD         SYSTEM ID:  [id number]   US Thoracentesis    Narrative    EXAM:  1. RIGHT THORACENTESIS  2. ULTRASOUND GUIDANCE  LOCATION: [facility]  DATE/TIME: 11/22/2024 4:23 PM    INDICATION: Symptomatic pleural effusions.    PROCEDURE: Procedure performed at the patient's bedside. Informed  consent obtained. Time out performed. The posterior lateral aspect of  the right hemithorax was prepped and draped in a sterile fashion. 10  mL of 1% lidocaine was infused into local soft tissues. A 5 Irish  catheter system was introduced into the pleural effusion under  ultrasound guidance.    1.5 liters of clear fluid were removed.    Patient tolerated procedure well.    Ultrasound images have been permanently captured for documentation.      Impression    IMPRESSION:  Status post ultrasound-guided right thoracentesis.    TERENCE GEORGE MD         SYSTEM ID:  [id number]   XR Chest Port 1 View    Narrative    EXAM: XR CHEST PORTABLE 1 VIEW  LOCATION: [facility]  DATE: 11/23/2024    INDICATION: Hypoxia.  COMPARISON: None.      Impression    IMPRESSION: Heart size is enlarged and there is moderate  bilateral pulmonary edema with small bilateral pleural effusions. Findings are compatible with congestive heart failure. Overlying infectious process involving the lung bases is not excluded.     XR Chest 2 Views    Narrative    EXAM: XR CHEST 2 VIEWS  LOCATION: [facility]  DATE: 12/3/2024    INDICATION: persistent hypoxia after thoracentesis  COMPARISON: 11/23/2024      Impression    IMPRESSION: Mild pulmonary edema, slightly increased compared to prior. Moderate bilateral pleural effusions and bilateral lower lobe consolidation, not significantly changed. No pneumothorax. Stable cardiomediastinal silhouette.   US Thoracentesis    Narrative    ULTRASOUND GUIDED THORACENTESIS  12/4/2024 3:06 PM     HISTORY: Bilateral recurrent pleural effusion    FINDINGS: Ultrasound was used to evaluate for the presence and best  approach for drainage of a pleural effusion. Written and oral informed  consent was obtained. A pause for the cause procedure to verify the  correct patient and correct procedure. The skin overlying the right  chest posteriorly was prepped and draped in the usual sterile fashion.  The subcutaneous tissues were anesthetized with 10 mL 1% lidocaine. A  catheter was advanced into the pleural space and 1450 mL of  yellow  colored fluid was drained. Ultrasound images were permanently stored.   There were no immediate complications. Patient left the ultrasound  suite in satisfactory condition.      Impression    IMPRESSION: Technically successful thoracentesis without immediate  complications.    SILVIO COOK MD         SYSTEM ID:  [id number]   US Thoracentesis    Narrative    EXAM:  1. LEFT THORACENTESIS  2. ULTRASOUND GUIDANCE  LOCATION: [facility]  DATE/TIME: 12/5/2024 12:15 PM    INDICATION: Large left pleural effusion. Previous large volume right  thoracentesis yesterday.    PROCEDURE: Informed consent obtained. Time out performed. A limited  ultrasound of the left  chest was performed demonstrating a large  pleural effusion. The largest collection of fluid was localized and  the overlying skin was prepped and draped in a sterile fashion. 10 mL  of 1% lidocaine was infused into local soft tissues. A 5 Thai  catheter system was introduced into the pleural effusion under  ultrasound guidance.    1.1 liters of clear fluid were removed.    Patient tolerated procedure well.    Ultrasound images have been permanently captured for documentation.      Impression    IMPRESSION:  Ultrasound-guided left thoracentesis performed without complication..    KATE MCDOWELL MD         SYSTEM ID:  [id number]   CT Chest w/o Contrast    Narrative    CT CHEST W/O CONTRAST 12/5/2024 12:30 PM    CLINICAL HISTORY: Post thora. Evaluation for chest expansion.    TECHNIQUE: CT chest without IV contrast. Multiplanar reformats were  obtained. Dose reduction techniques were used.  CONTRAST: None.    COMPARISON: 11/21/2024, chest x-ray 12/3/2024    FINDINGS:   LUNGS AND PLEURA: Small bilateral pleural effusions, right greater  than left, improved since 11/21/2024. Near complete atelectasis of the  right lower lobe has increased since 11/21/2024. Dense atelectasis in  the left lower lobe has slightly improved since 11/21/2024. Previously  seen groundglass opacities in the right middle lobe have resolved.  Mild linear intralobular septal thickening may be due to mild  pulmonary edema. Mild atelectasis in the medial left upper lobe. No  discrete pulmonary nodules. Tiny left pneumothorax, likely related to  thoracentesis (series 4, image 222).    MEDIASTINUM/AXILLAE: Nodules in the thyroid gland measuring up to 1.1  cm in the right lobe. Stable mildly enlarged mediastinal and hilar  lymph nodes, nonspecific and likely reactive. No thoracic aortic  aneurysm. Severe coronary artery calcifications. Trace pericardial  effusion. Mild cardiomegaly. Small hiatal hernia.    UPPER ABDOMEN: No significant  finding.    MUSCULOSKELETAL: No suspicious lesions in the bones.      Impression    IMPRESSION:   1.  Small bilateral pleural effusions have decreased since 11/21/2024.  2.  Tiny left basilar pneumothorax, likely related to recent  thoracentesis.  3.  Near complete atelectasis of the right lower lobe has worsened,  and dense atelectasis in the left lower lobe has improved since  11/21/2024. Previously seen groundglass opacities in the right middle  lobe have resolved.  4.  Mild pulmonary edema.    BRANDON ALBERTS MD         SYSTEM ID:  [id number]   XR Chest 2 Views    Narrative    XR CHEST 2 VIEWS  12/6/2024 12:24 PM       INDICATION: Evaluation for pneumothorax and improvement of  atelectasis.  COMPARISON: 12/5/2024       Impression    IMPRESSION: Small left and moderate right pleural effusions have not  appreciably changed. There is atelectasis/consolidation in both lower  lobes, also similar. Tiny left pneumothorax measuring a few  millimeters at the apex.    RAGINI DEVINE MD         SYSTEM ID:  [id number]   CT Chest w/o Contrast    Narrative    EXAM: CT CHEST W/O CONTRAST  LOCATION: [facility]  DATE: 12/8/2024    INDICATION: ?worsening atelectasis versus effusion  COMPARISON: CT chest 12/05/2024.  TECHNIQUE: CT chest without IV contrast. Multiplanar reformats were obtained. Dose reduction techniques were used.  CONTRAST: None.    FINDINGS:   LUNGS AND PLEURA: Increased size of large right and moderate size left pleural effusions with worsening atelectasis. There is now complete collapse of the bilateral lower lobes and right middle lobe. Interlobular septal thickening. New left upper lobe   groundglass opacity. No pneumothorax.    MEDIASTINUM/AXILLAE: Similar multinodular thyroid gland. Stable borderline enlarged mediastinal lymph nodes which are nonspecific but favored reactive. Trace pericardial effusion. Low attenuation of the blood pool which can be seen with anemia. Moderate    calcification of the thoracic aorta which is not aneurysmal. Calcifications of the aortic valve.    CORONARY ARTERY CALCIFICATION: Severe.    UPPER ABDOMEN: Heavy vascular calcifications.    MUSCULOSKELETAL: Worsening diffuse body wall edema. No acute osseous findings.      Impression    IMPRESSION:   1.  Increased size of the large right and moderate size left pleural effusions with worsening atelectasis. There is now complete collapse of the bilateral lower lobes and right middle lobe.  2.  Interstitial pulmonary edema. New left upper lobe groundglass opacity which may represent a focus of alveolar edema versus an infectious/inflammatory focus.  3.  Worsening anasarca.  4.  Severe coronary atherosclerosis.  5.  Low attenuation of the blood pool which can be seen with anemia.     US Thoracentesis    Narrative    ULTRASOUND GUIDED THORACENTESIS  12/8/2024 12:07 PM     HISTORY: recurrent pleural effusion with worsening hypoxia and dyspnea    FINDINGS: Limited ultrasound was performed to evaluate for the  presence and best approach for drainage of a pleural effusion. An  image is archived. Written and oral informed consent was obtained. A  pause for the cause procedure to verify the correct patient and  correct procedure.     The skin overlying the right chest posteriorly was prepped and draped  in the usual sterile fashion. The subcutaneous tissues were  anesthetized with 1% lidocaine. Under direct ultrasound guidance a  catheter was advanced into the pleural space and 1500 mL of  dario  colored fluid was drained. The catheter was removed and a sterile  dressing was applied.     Patient was monitored by nurse under my direct supervision throughout  the exam. Ultrasound images were permanently stored.  There were no  immediate complications. Patient left the ultrasound suite in  satisfactory condition.      Impression    IMPRESSION: Technically successful thoracentesis without immediate  complications.    JACKIE PAIZ  MD BESSIE         SYSTEM ID:  [id number]   US Thoracentesis    Narrative    EXAM:  1. LEFT THORACENTESIS  2. ULTRASOUND GUIDANCE  LOCATION: [facility]  DATE/TIME: 12/9/2024 11:25 AM    INDICATION: Pleural effusion.    PROCEDURE: Informed consent obtained. Time out performed. The chest  was prepped and draped in a sterile fashion. 10 mL of 1% lidocaine was  infused into local soft tissues. A 5 English catheter system was  introduced into the pleural effusion under ultrasound guidance.    1.1 liters of clear fluid were removed and sent to lab if requested.    Patient tolerated procedure well.    Ultrasound images have been permanently captured for documentation.      Impression    IMPRESSION:  Status post ultrasound-guided left thoracentesis.    RAGINI DEVINE MD         SYSTEM ID:  [id number]   IR Chest Tube Drain Tunneled Right    Narrative    [facility]  DATE: 12/12/2024    INSERTION OF TUNNELED PLEURAL CATHETER WITH CUFF  MODERATE SEDATION     ATTENDING: Tay Jordan MD.    INDICATION: Recurrent right pleural effusion    CONSENT: The risks, benefits, and alternatives of tunneled pleural  drainage catheter placement were discussed with the patient in detail.  All questions were answered. Informed consent was given to proceed  with the procedure.    MODERATE SEDATION: Prior to the procedure, the patient was alert and  oriented. Versed 2 mg and fentanyl 50 mcg were administered  intravenously with continuous vital signs monitoring by the radiology  nurse under my direct supervision. Patient was alert and oriented post  procedure. Total face to face moderate sedation time: 17 minutes.    ADDITIONAL MEDICATIONS: Ancef 2 g IV.    FLUOROSCOPIC TIME: 0.4 mins.  DOSE: Air Kerma: 5.9 mGy.    CONTRAST: None.    UNIVERSAL PRECAUTIONS: The procedure was performed utilizing maximum  sterile barrier technique. Prior to the start of the procedure, a  standard pause for patient safety was performed with site  marking as  indicated.    COMPLICATIONS: No immediate complications.    FINDINGS: After obtaining informed and oral consent, the patient was  prepped and draped in a sterile fashion. Prior to the procedure, the  right pleural effusion was visualized using ultrasound. Local  anesthesia was infiltrated into the right posterior lateral soft  tissues. Using a Soweso catheter, the pleural space was punctured and a  guidewire advanced into the pleural space. A 6 Syriac dilator was  placed. Skin tunnel was created in the usual manner. Tract was dilated  and a 16 Syriac peel-away sheath advanced into the pleural space. 15.5  Syriac Pleuryx tunneled catheter was advanced through the peel-away  sheath and positioned within the pleural space. 2000 mL of bloody  pleural fluid was removed. The catheter was sutured place using 3-0  Prolene. The access site was closed using 4-0 Vicryl. The patient  tolerated the procedure well without immediate complications.    CONCLUSION:   1. Placement of right  pleural tunneled drainage catheter.    BITA WHITLOCK MD         SYSTEM ID:  [id number]   Echocardiogram Limited     Value    LVEF  55-60%    Narrative    [id number]  [id number]  [id number]^YESY^SUGAR^ANH     [facility]  Echocardiography Laboratory  [address] [place] [address]     Name: KASIA WAN  MRN: [id number]  : 1960  Study Date: 2024 08:43 AM  Age: 64 yrs  Gender: Female  Patient Location: [facility]  Reason For Study: CHF  Ordering Physician: SUGAR HAYWARD  Performed By: Tamica Ramon     BSA: 1.4 m2  Height: 62 in  Weight: 99 lb  HR: 60  BP: 163/80 mmHg  ______________________________________________________________________________  Procedure  Limited Portable Echo Adult. Adequate quality two-dimensional was performed  and interpreted.  ______________________________________________________________________________  Interpretation Summary     The  left ventricle is normal in size.  Left ventricular systolic function is normal.  The visual ejection fraction is 55-60%.  Normal left ventricular wall motion  The right ventricle is normal in structure, function and size.  There is mild (1+) mitral regurgitation.  There is mild (1+) tricuspid regurgitation.  Right ventricular systolic pressure is elevated, consistent with mild  pulmonary hypertension.  Compared to prior study, there is no significant change.  ______________________________________________________________________________  Left Ventricle  The left ventricle is normal in size. There is normal left ventricular wall  thickness. Left ventricular systolic function is normal. The visual ejection  fraction is 55-60%. Left ventricular diastolic function is normal. Normal left  ventricular wall motion.     Right Ventricle  The right ventricle is normal in structure, function and size.     Atria  Normal left atrial size. Right atrial size is normal. There is no atrial shunt  seen.     Mitral Valve  There is mild (1+) mitral regurgitation.     Tricuspid Valve  The tricuspid valve is normal in structure and function. There is mild (1+)  tricuspid regurgitation. The right ventricular systolic pressure is  approximated at 33.6 mmHg plus the right atrial pressure. Right ventricular  systolic pressure is elevated, consistent with mild pulmonary hypertension.  IVC diameter <2.1 cm collapsing >50% with sniff suggests a normal RA pressure  of 3 mmHg.     Aortic Valve  There is moderate trileaflet aortic sclerosis. No aortic regurgitation is  present. No aortic stenosis is present.     Pulmonic Valve  The pulmonic valve is not well seen, but is grossly normal. There is trace  pulmonic valvular regurgitation.     Vessels  Normal size aorta.     Pericardium  There is no pericardial effusion.     ______________________________________________________________________________  MMode/2D Measurements & Calculations  IVSd: 0.93  cm  LVIDd: 4.6 cm  LVIDs: 3.2 cm  LVPWd: 0.93 cm  FS: 30.2 %     LV mass(C)d: 142.0 grams  LV mass(C)dI: 100.1 grams/m2  Ao root diam: 2.8 cm  asc Aorta Diam: 3.3 cm  LVOT diam: 2.0 cm  LVOT area: 3.0 cm2  Ao root diam index Ht(cm/m): 1.8  Ao root diam index BSA (cm/m2): 2.0  Asc Ao diam index BSA (cm/m2): 2.3  Asc Ao diam index Ht(cm/m): 2.1  RWT: 0.41  TAPSE: 2.4 cm     Doppler Measurements & Calculations  MV max P.9 mmHg  MV mean P.8 mmHg  MV V2 VTI: 38.4 cm  MVA(VTI): 1.8 cm2  Ao V2 max: 123.8 cm/sec  Ao max P.0 mmHg  Ao V2 mean: 95.3 cm/sec  Ao mean PG: 3.9 mmHg  Ao V2 VTI: 30.5 cm  KVNG(I,D): 2.3 cm2  KVNG(V,D): 2.6 cm2  LV V1 max P.5 mmHg  LV V1 max: 106.1 cm/sec  LV V1 VTI: 23.5 cm  SV(LVOT): 70.2 ml  SI(LVOT): 49.5 ml/m2     PA acc time: 0.10 sec  TR max perfecto: 289.9 cm/sec  TR max P.6 mmHg  AV Perfecto Ratio (DI): 0.86  KVNG Index (cm2/m2): 1.6  RV S Perfecto: 12.9 cm/sec     ______________________________________________________________________________  Report approved by: Cheryl Palacios 2024 10:38 AM         Cardiac Catheterization    Narrative    1) Normal right and left sided filling pressures  2) No evidence of pulmonary hypertension  3) Normal CO/CI       *Note: Due to a large number of results and/or encounters for the requested time period, some results have not been displayed. A complete set of results can be found in Results Review.       Discharge Medications   Current Discharge Medication List        START taking these medications    Details   apixaban ANTICOAGULANT (ELIQUIS) 2.5 MG tablet Take 1 tablet (2.5 mg) by mouth 2 times daily.  Qty: 60 tablet, Refills: 1    Associated Diagnoses: Chronic atrial fibrillation (H)      carvedilol (COREG) 12.5 MG tablet Take 1 tablet (12.5 mg) by mouth 2 times daily (with meals).  Qty: 60 tablet, Refills: 1    Associated Diagnoses: Essential hypertension      ipratropium - albuterol 0.5 mg/2.5 mg/3 mL (DUONEB) 0.5-2.5 (3) MG/3ML neb solution  Take 1 vial (3 mLs) by nebulization every 6 hours as needed for shortness of breath, wheezing or cough.  Qty: 360 mL, Refills: 0    Associated Diagnoses: Acute respiratory failure with hypoxia (H); Chronic obstructive pulmonary disease, unspecified COPD type (H); Gastroesophageal reflux disease with esophagitis without hemorrhage      nicotine (NICODERM CQ) 21 MG/24HR 24 hr patch Place 1 patch over 24 hours onto the skin daily.  Qty: 60 patch, Refills: 0    Associated Diagnoses: Tobacco use disorder      nicotine (NICORETTE) 2 MG gum Place 1 each (2 mg) inside cheek every hour as needed for nicotine withdrawal symptoms.  Qty: 30 each, Refills: 0    Associated Diagnoses: Tobacco use disorder      polyethylene glycol (MIRALAX) 17 g packet Take 17 g by mouth daily as needed for constipation.  Qty: 30 packet, Refills: 0    Associated Diagnoses: Constipation, unspecified constipation type           CONTINUE these medications which have CHANGED    Details   albuterol (PROAIR HFA/PROVENTIL HFA/VENTOLIN HFA) 108 (90 Base) MCG/ACT inhaler Inhale 2 puffs into the lungs every 4 hours as needed for shortness of breath, wheezing or cough.  Qty: 18 g, Refills: 0    Comments: Pharmacy may dispense brand covered by insurance (Proair, or proventil or ventolin or generic albuterol inhaler)  Associated Diagnoses: Chronic obstructive pulmonary disease, unspecified COPD type (H)      hydrALAZINE (APRESOLINE) 50 MG tablet Take 1 tablet (50 mg) by mouth 3 times daily as needed (systolic above 180).    Associated Diagnoses: Essential hypertension      isosorbide mononitrate (IMDUR) 120 MG 24 HR ER tablet Take 1 tablet (120 mg) by mouth daily.  Qty: 30 tablet, Refills: 1    Associated Diagnoses: Essential hypertension      torsemide (DEMADEX) 20 MG tablet Take 1 tablet (20 mg) by mouth 2 times daily.  Qty: 60 tablet, Refills: 0    Associated Diagnoses: Acute on chronic heart failure, unspecified heart failure type (H)           CONTINUE  these medications which have NOT CHANGED    Details   acetaminophen (TYLENOL) 500 MG tablet Take 1-2 tablets (500-1,000 mg) by mouth every 6 hours as needed for mild pain  Qty: 60 tablet, Refills: 0    Associated Diagnoses: Wound infection      amLODIPine (NORVASC) 10 MG tablet Take 1 tablet (10 mg) by mouth daily  Qty: 30 tablet, Refills: 0    Associated Diagnoses: Benign essential hypertension      budesonide-formoterol (SYMBICORT) 160-4.5 MCG/ACT Inhaler Inhale 2 puffs into the lungs two times daily Disp 3 inhalers  Qty: 30.6 g, Refills: 3    Associated Diagnoses: Chronic obstructive pulmonary disease, unspecified COPD type (H)      clopidogrel (PLAVIX) 75 MG tablet Take 1 tablet (75 mg) by mouth daily.  Qty: 90 tablet, Refills: 3    Associated Diagnoses: Peripheral vascular disease (H)      gabapentin (NEURONTIN) 100 MG capsule Take 2 capsules (200 mg) by mouth at bedtime  Qty: 180 capsule, Refills: 0    Associated Diagnoses: Wound infection      nitroGLYcerin (NITROSTAT) 0.4 MG sublingual tablet Place 1 tablet (0.4 mg) under the tongue See Admin Instructions for chest pain  Qty: 30 tablet, Refills: 11    Associated Diagnoses: Coronary artery disease involving native coronary artery of native heart without angina pectoris      rosuvastatin (CRESTOR) 10 MG tablet Take 1 tablet (10 mg) by mouth at bedtime  Qty: 30 tablet, Refills: 6    Associated Diagnoses: Hyperlipidemia LDL goal <70           STOP taking these medications       atenolol (TENORMIN) 25 MG tablet Comments:   Reason for Stopping:         empagliflozin (JARDIANCE) 10 MG TABS tablet Comments:   Reason for Stopping:             Allergies   Allergies   Allergen Reactions    Contrast Dye Anaphylaxis     Pt reported facial and throat swelling with prior CT contrast    Pantoprazole      Protonix caused diffuse edema    Chantix [Varenicline]      Terrible dreams    Gluten Meal GI Disturbance     Pt has celiac disease. Can not have gluten    Penicillins  Itching and Rash

## 2024-12-13 NOTE — PLAN OF CARE
SUMMARY: Admitted for evaluation of SOB. Multifactorial- COPD, PNA, CHF. S/p thoracentesis for effusion.     DATE & TIME: 12/13/2024 [id number]  Cognitive Concerns/ Orientation : A&O x 4  BEHAVIOR & AGGRESSION TOOL COLOR: Green   ABNL VS/O2: VSS on 2LPM via nasal with humidification; 's, has PRN if >180 available. Weaned from 3 to 2 L this shift, continue to wean as able. Goal sats 88-92%. Pt desats to 86% on RA at rest.   MOBILITY: Pivots to BSC.  Right AKA. Turned and repositioned as pt allows.   PAIN MANAGMENT: Right flank discomfort at chest tube insertion, improving though. Given tylenol x1  DIET: cardiac, 1800 fluid restriction (increased today). Good appetite  BOWEL/BLADDER: Intermittent incontinence, pure wick in place. No BM this shift    ABNL LAB/BG: Na 138. Hgb 8.3 Creat 1.53 improving  DRAIN/DEVICES: PIV SL   SKIN: Dusky, blanchable redness to coccyx, mepilex on. Dressings to thoracentesis site left side, CDI. Dressing for right sided pleurx drain CDI- per orders to be changed 24 hours after so at 1830 tonight  TESTS/PROCEDURES: Pleurx catheter placed yesterday   D/C DATE: to home today once meds filled, oxygen/nebulizer delivered.   OTHER IMPORTANT INFO: On scheduled nebs. Nephrology/pulm signed off. Care coordinator given packet for home supplies for Pleurx and working on this. [facility] called to deliver nebulizer and oxygen for discharge

## 2024-12-13 NOTE — CARE PLAN
12/13/24 1350   Home Oxygen Assessment (RN/RT ONLY)   Does patient have oxygen at home? No   1. SpO2 on room air at rest while awake 86       Oxygen LPM at rest 2   3. SpO2 on room air during Activity/with exercise 85   4. SpO2 with oxygen during activity/with exercise 91       Oxygen LPM during activity/with exercise 2   Does patient qualify for Home O2? Yes

## 2024-12-13 NOTE — PLAN OF CARE
Goal Outcome Evaluation:  SUMMARY: Admitted for evaluation of SOB. Multifactorial- COPD, PNA, CHF. S/p thoracentesis for effusion.     DATE & TIME: 12/12-12/13/24 [id number]  Cognitive Concerns/ Orientation : A&O x 4  BEHAVIOR & AGGRESSION TOOL COLOR: Green   ABNL VS/O2: VSS on 2-3LPM via nasal with humidification; BP shcheduled meds; has PRN if >180 available. Unable to wean last night.  MOBILITY: Pivots to BSC.  Right AKA. Turned and repositioned throughout night  PAIN MANAGMENT: C/O of severe pain. Given Tylenol x2 and 10mg of oxycodone x1.  DIET: cardiac, 1200 fluid restriction  BOWEL/BLADDER: Intermittent incontinence, pure wick in place. No BM this shift    ABNL LAB/BG: none new, last: Na 133. Hgb 7.9. Creat 1.57 improving  DRAIN/DEVICES: PIV SL   SKIN: Dusky, blanchable redness to coccyx, mepilex on. Dressings to thoracentesis sites, CDI. No hematoma noted.   TESTS/PROCEDURES: Pleurx catheter placed yesterday   D/C DATE: TBD pending improvement 2+ days, lives at home with family  OTHER IMPORTANT INFO: On scheduled nebs, refusing BIPAP and chest physiotherapy. Nephrology signed off and Pulmonology following.

## 2024-12-13 NOTE — PROGRESS NOTES
Oxygen Documentation  I certify that this patient, Shirley Hendricks has been under my care (or a nurse practitioner or physican's assistant working with me). This is the face-to-face encounter for oxygen medical necessity.      At the time of this encounter, I have reviewed the qualifying testing and have determined that supplemental oxygen is reasonable and necessary and is expected to improve the patient's condition in a home setting.         Patient has continued oxygen desaturation due to Chronic Heart Failure I50  COPD J44.9.    If portability is ordered, is the patient mobile within the home? yes    Was this visit performed as a telehealth visit: No

## 2024-12-14 ENCOUNTER — PATIENT OUTREACH (OUTPATIENT)
Dept: CARE COORDINATION | Facility: CLINIC | Age: 64
End: 2024-12-14
Payer: COMMERCIAL

## 2024-12-14 NOTE — PROGRESS NOTES
[facility]    Background: Transitional Care Management program identified per system criteria and reviewed by [facility] [place] team for possible outreach.    Assessment: Upon chart review, [facility] Team member will not proceed with patient outreach related to this episode of Transitional Care Management program due to reason below:    Patient has a follow up appointment with an appropriate provider today for hospital discharge    Plan: Transitional Care Management episode addressed appropriately per reason noted above.      Isha Davidson MA  [facility]    *Connected Care Resource Team does NOT follow patient ongoing. Referrals are identified based on internal discharge reports and the outreach is to ensure patient has an understanding of their discharge instructions.

## 2024-12-14 NOTE — PLAN OF CARE
Discharge    Patient discharged home via personal wheelchair with daughter.    Discharge instructions and education reviewed, medication schedule and follow up appts discussed. AVS given to patient. patient verbalizes understanding of medications and discharge instructions. Patient verbalized understanding. Patient's questions answered; no concerns expressed.     Listed belongings gathered and given to patient (including from security/pharmacy). Yes  Care Plan and Patient education resolved: Yes  Prescriptions if needed, hard copies sent with patient  Yes Filled and given to patient at discharge.  Medication Bin checked and emptied on discharge Yes  SW/care coordinator/charge RN aware of discharge: Yes

## 2024-12-16 ENCOUNTER — TELEPHONE (OUTPATIENT)
Dept: INTERNAL MEDICINE | Facility: CLINIC | Age: 64
End: 2024-12-16

## 2024-12-16 ENCOUNTER — LAB REQUISITION (OUTPATIENT)
Dept: LAB | Facility: CLINIC | Age: 64
End: 2024-12-16
Payer: COMMERCIAL

## 2024-12-16 DIAGNOSIS — I50.23 ACUTE ON CHRONIC SYSTOLIC (CONGESTIVE) HEART FAILURE (H): ICD-10-CM

## 2024-12-16 LAB
ANION GAP SERPL CALCULATED.3IONS-SCNC: 10 MMOL/L (ref 7–15)
BUN SERPL-MCNC: 54 MG/DL (ref 8–23)
CALCIUM SERPL-MCNC: 8.3 MG/DL (ref 8.8–10.4)
CHLORIDE SERPL-SCNC: 95 MMOL/L (ref 98–107)
CREAT SERPL-MCNC: 1.32 MG/DL (ref 0.51–0.95)
EGFRCR SERPLBLD CKD-EPI 2021: 44 ML/MIN/1.73M2
ERYTHROCYTE [DISTWIDTH] IN BLOOD BY AUTOMATED COUNT: 15.4 % (ref 10–15)
GLUCOSE SERPL-MCNC: 79 MG/DL (ref 70–99)
HCO3 SERPL-SCNC: 31 MMOL/L (ref 22–29)
HCT VFR BLD AUTO: 26.9 % (ref 35–47)
HGB BLD-MCNC: 8.8 G/DL (ref 11.7–15.7)
MCH RBC QN AUTO: 28.3 PG (ref 26.5–33)
MCHC RBC AUTO-ENTMCNC: 32.7 G/DL (ref 31.5–36.5)
MCV RBC AUTO: 87 FL (ref 78–100)
PLATELET # BLD AUTO: 202 10E3/UL (ref 150–450)
POTASSIUM SERPL-SCNC: 4 MMOL/L (ref 3.4–5.3)
RBC # BLD AUTO: 3.11 10E6/UL (ref 3.8–5.2)
SODIUM SERPL-SCNC: 136 MMOL/L (ref 135–145)
WBC # BLD AUTO: 3.8 10E3/UL (ref 4–11)

## 2024-12-16 PROCEDURE — 85027 COMPLETE CBC AUTOMATED: CPT | Mod: ORL | Performed by: INTERNAL MEDICINE

## 2024-12-16 PROCEDURE — 80048 BASIC METABOLIC PNL TOTAL CA: CPT | Mod: ORL | Performed by: INTERNAL MEDICINE

## 2024-12-16 NOTE — TELEPHONE ENCOUNTER
Home Care is calling regarding an established patient with [facility].       Requesting orders from: Vasquez Benoit  Provider is following patient: No       Orders Requested    Skilled Nursing  Request for  frequency of visits to empty Plurex drain. Once Dr. Benoit orders frequency will request nursing visits.    For now requesting skilled nursing 2 X / wk fr 4 weeks and 1 X / wk for 4 wks    Physical Therapy  Request for initial evaluation and treatment (one time) (first set of orders)   CHF with weakness and deconditioning.       Occupational Therapy  Request for new wheel chair cushion.       Social Work  Request for initial evaluation and treatment (one time) (first set of orders)   Resources - primarily transportation.     Information was gathered and will be sent to provider for review.  RN will contact Home Care with information after provider review.  Confirmed ok to leave a detailed message with call back.  Contact information confirmed and updated as needed.    Isha Owens RN

## 2024-12-16 NOTE — TELEPHONE ENCOUNTER
" OK for orders as requested. I am not managing pt's PleuRx. It is being managed by Pulmonary. Per note in chart from 12/13/24:  \"CC reached out to see if Dr Benoit would be the provider to follow with the pleurX catheter. He has declined due to not having much familiarity with such. Dr Jacoby Torres, Pulmonary, has agreed to be the prescriber until patient establishes care with Interventional Pulmonology. \"     "

## 2024-12-17 ENCOUNTER — TELEPHONE (OUTPATIENT)
Dept: PULMONOLOGY | Facility: CLINIC | Age: 64
End: 2024-12-17
Payer: COMMERCIAL

## 2024-12-17 ENCOUNTER — TELEPHONE (OUTPATIENT)
Dept: ANTICOAGULATION | Facility: CLINIC | Age: 64
End: 2024-12-17
Payer: COMMERCIAL

## 2024-12-17 ENCOUNTER — TELEPHONE (OUTPATIENT)
Facility: CLINIC | Age: 64
End: 2024-12-17
Payer: COMMERCIAL

## 2024-12-17 ENCOUNTER — TELEPHONE (OUTPATIENT)
Dept: CARDIOLOGY | Facility: CLINIC | Age: 64
End: 2024-12-17
Payer: COMMERCIAL

## 2024-12-17 NOTE — TELEPHONE ENCOUNTER
MTM referral from: Transitions of Care (recent hospital discharge, TCU discharge, or ED visit)    MTM referral outreach attempt #2 on December 17, 2024 at 11:45 AM      Outcome: Patient is not interested at this time because she already spoke with home health nurse yesterday, not interested in scheduling at this time    Use hbc for the carrier/Plan on the flowsheet          Cindy Pandya  MTM

## 2024-12-17 NOTE — TELEPHONE ENCOUNTER
ANTICOAGULATION DIRECT ORAL ANTICOAGULANT MONITORING    SUBJECTIVE     The [facility] is evaluating Shirley Hendricks's Apixaban (Eliquis) as part of its Anticoagulation Monitoring Program.    Indication:Atrial Fibrillation new onset   Current dose per medication list: Apixaban 2.5 mg TWICE daily  Recent hospitalizations/ED/Office Visits for bleeding/clotting concerns: No  Other bleeding or side effect concerns: No  Additional findings: chronic heart failure stage 4 ckd    OBJECTIVE     Age: 64 year old    Wt Readings from Last 2 Encounters:   12/13/24 49.8 kg (109 lb 12.6 oz)   09/23/24 44.8 kg (98 lb 12.8 oz)      Lab Results   Component Value Date    CR 1.32 (H) 12/16/2024    CR 1.53 (H) 12/13/2024    CR 1.53 (H) 12/12/2024     Creatinine Clearance (using actual bodyweight, mL/min):     Lab Results   Component Value Date    HGB 8.8 12/16/2024    HGB 8.8 07/09/2021     12/16/2024     07/09/2021     ASSESSMENT/PLAN     A chart review for Direct Oral Anticoagulant (DOAC) Stewardship has been completed for:     Dosing: dose appropriate for age, weight and renal function    Plan made per [facility] anticoagulation protocol    Jennifer Paniagua RN  Anticoagulation Clinic

## 2024-12-17 NOTE — TELEPHONE ENCOUNTER
Patient was admitted to [facility] on 11/20/24 with SOB. Acute hypoxic respiratory failure, etiology multifactorial likely secondary to acute decompensation of HFpEF, large pleural effusions, COPD.    PMH: coronary artery disease, peripheral artery disease, bilateral carotid endarterectomies, history of stress cardiomyopathy with recovered EF, HFpEF, hypertension, hyperlipidemia, COPD with ongoing tobacco use, malignant B-cell lymphoma, stage IIIb CKD historically requiring temporary dialysis (6/2024).    11/20/24: Right sided thoracentesis removed 1.1 L of straw colored fluid.    11/20/24: Left sided thoracentesis with removal of 1200 ml of yellow colored fluid.    11/22/24: Echo showed EF of 55-60%. Normal left ventricular wall motion. The right ventricle is normal in structure, function and size. There is mild (1+) mitral regurgitation. There is mild (1+) tricuspid regurgitation. Right ventricular systolic pressure is elevated, consistent with mild pulmonary hypertension.    New onset A. Fib with RVR.    11/27/24: RHC via RIJ showed:    1) Normal right and left sided filling pressures.  2) No evidence of pulmonary hypertension.  3) Normal CO/CI.    12/8/24: Pt with significant worsening hypoxia with increasing oxygen needs up to 10 L on oxy mask repeated chest CT that shows recurrent large bilateral pleural effusions with right greater than left. Repeat ultrasound right thoracentesis with removal of 1500 cc.     12/12/24-Pulmonology and consulted and Pleurx cathether on right side. Discharged to home with Pleurx catheter in place.    IV Lasix and then Bumex diuresed.     Pt was started on Eliquis and Coreg. PTA Hydralazine changed from scheduled TID to TID PRN for SBP >180, Imdur and Demadex dosages increased. Atenolol and Jardiance were discontinued at time of discharge.    Called patient to discuss any post hospital d/c questions she may have, review medication changes, and confirm f/u appts. Patient denied any  "questions regarding new medications or changes to PTA medications. States [facility] RN came yesterday and medications reviewed.    Patient denied any increased SOB, any chest pain, or light headedness. States the [facility] RN \"drained me\" yesterday. Questions answered regarding A. Fib.    RN confirmed with patient that she is scheduled for an OV on 12/20/24 at 1230 with BALJINDER Mary Silva at our [place] [facility]. Dr. Huang's Team RN phone number provided.    Pt was discharged with [facility] services.    Patient advised to call clinic with any cardiac related questions or concerns prior to this baljinder't. Patient verbalized understanding and agreed with plan. YULIA Robins RN.         "

## 2024-12-17 NOTE — TELEPHONE ENCOUNTER
RN spoke with [facility] pleurx specialist, if Dr. Torres can fill out written order form for patient's pleurx supplies. Pleurx was placed while patient was in hospital and IR wants pulm to manage. Simon informs that written order must be faxed over first, and they will verify with insurance and follow up with patient.     RN faxed written order document to [facility]  at  [phone]    -Chelsea, RN

## 2024-12-18 ENCOUNTER — VIRTUAL VISIT (OUTPATIENT)
Dept: INTERNAL MEDICINE | Facility: CLINIC | Age: 64
End: 2024-12-18
Payer: COMMERCIAL

## 2024-12-18 ENCOUNTER — TELEPHONE (OUTPATIENT)
Dept: PULMONOLOGY | Facility: CLINIC | Age: 64
End: 2024-12-18

## 2024-12-18 ENCOUNTER — TELEPHONE (OUTPATIENT)
Dept: INTERNAL MEDICINE | Facility: CLINIC | Age: 64
End: 2024-12-18

## 2024-12-18 DIAGNOSIS — D64.9 ANEMIA, UNSPECIFIED TYPE: ICD-10-CM

## 2024-12-18 DIAGNOSIS — F17.200 TOBACCO USE DISORDER: ICD-10-CM

## 2024-12-18 DIAGNOSIS — J90 PLEURAL EFFUSION, RIGHT: ICD-10-CM

## 2024-12-18 DIAGNOSIS — I48.0 PAROXYSMAL ATRIAL FIBRILLATION (H): ICD-10-CM

## 2024-12-18 DIAGNOSIS — I50.31 ACUTE HEART FAILURE WITH PRESERVED EJECTION FRACTION (H): ICD-10-CM

## 2024-12-18 DIAGNOSIS — N18.32 STAGE 3B CHRONIC KIDNEY DISEASE (H): ICD-10-CM

## 2024-12-18 DIAGNOSIS — J44.9 CHRONIC OBSTRUCTIVE PULMONARY DISEASE, UNSPECIFIED COPD TYPE (H): ICD-10-CM

## 2024-12-18 PROCEDURE — 99214 OFFICE O/P EST MOD 30 MIN: CPT | Mod: 95 | Performed by: INTERNAL MEDICINE

## 2024-12-18 NOTE — TELEPHONE ENCOUNTER
Patient called the clinic requesting a note be sent to the provider regarding medication interactions.    Carvedilol and ipratopium albuterol   Carvedilol and albuterol  Eliquis and plavix   Isosorbide mononitrate and carvedilol    Sraika JIMENEZ RN  [facility] Triage Team

## 2024-12-18 NOTE — PROGRESS NOTES
Shirley is a 64 year old who is being evaluated via a billable video visit.    How would you like to obtain your AVS? Normalhart  If the video visit is dropped, the invitation should be resent by: Text to cell phone: [phone]  Will anyone else be joining your video visit? No        ASSESSMENT:    1. Pleural effusion, right   Now with Pleurx. Being managed by pulmonary. Breathing well currently and denies orthopnea, PND    2. Tobacco use disorder  Has not smoked since 11/19/2024.  Congratulated patient.  Continue nicotine patches     3. Acute heart failure with preserved ejection fraction (H)  Controlled with medical management including torsemide 20 mg twice daily.  Has follow-up with cardiology in 2 days.  Can have follow-up lab work at that time    4. Chronic obstructive pulmonary disease, unspecified COPD type (H)  Off of cigarettes.  Symptoms stable with Symbicort.  No recent NebuSal    5. Stage 3b chronic kidney disease (H)  Improved.  Recent GFR up to 44.   Continue lab monitoring with repeat lab 1 month.  Patient will also follow-up with nephrology.   - Basic metabolic panel; Future    6. Paroxysmal atrial fibrillation (H)  Most recently in sinus rhythm.  On Eliquis.  Continue management per cardiology      7. Anemia, unspecified type  Denies any blood in stools.  Likely combination of recent illness plus CKD.  Recheck lab 1 month.  Overall improving  - Iron & Iron Binding Capacity; Future  - CBC with platelets; Future  - Ferritin; Future        PLAN:   Follow-up with cardiology in 2 days as scheduled  Patient to contact Dr Blackburn at [facility] to confirm time of upcoming follow-up appointment  Continue current medications.  Remain off of cigarettes.  May use nicotine patches as needed   Pleurx management per pulmonary  Call  [phone] or use [employer] to schedule a future lab appointment  non-fasting in 1 month.       Video-Visit Details    Type of service:  Video Visit    Video Start Time:  5:18pm    Video End Time:5:39pm    Originating Location (pt. Location): Home    Distant Location (provider location):  [facility]     Platform used for Video Visit: Debby Watson is a 64 year old, presenting for the following health issues:  Hospital F/U       HPI       Hospital Follow-up Visit:    Hospital/Nursing Home/IP Rehab Facility: [facility]  Date of Admission: 11/20  Date of Discharge: 12/13  Reason(s) for Admission: Acute Hypoxic respiratory failure, acute chronic heart failure, acute kidney injury, trapped lung   Was the patient in the ICU or did the patient experience delirium during hospitalization?  No  Do you have any other stressors you would like to discuss with your provider? No    Problems taking medications regularly:  None  Medication changes since discharge: None  Problems adhering to non-medication therapy:  None    Summary of hospitalization:  [facility] discharge summary reviewed  Diagnostic Tests/Treatments reviewed.  Follow up needed: labs, pleural fluid level monitoring  Other Healthcare Providers Involved in Patient s Care:         Pulmonary, vascular,cardiology  Update since discharge: improved.     Plan of care communicated with patient        Discharge summary reviewed. Part of the summary as below:    [facility]  Hospitalist Discharge Summary       Date of Admission:  11/20/2024  Date of Discharge:  12/13/2024  6:39 PM  Discharging Provider: Yoli Huizar MD  Discharge Service: Hospitalist Service        Discharge Diagnoses  Acute hypoxic respiratory failure-multifactorial.  Acute exacerbation of heart failure with preserved EF.  Moderate to severe left pleural effusion status post thoracentesis.  -Likely trapped lung with recurrent pleural effusions bilaterally s/p Pleurx catheter placement for trapped lung on the right side        Clinically  Significant Risk Factors     # Moderate Malnutrition: based on nutrition assessment            Follow-ups Needed After Discharge  Follow-up Appointments         Follow-up and recommended labs and tests        Follow up with primary care provider, Vasquez Benoit, within 7 days for hospital follow- up.  The following labs/tests are recommended: cbc and bmp     Follow with Cardiology further management of heart failure      follow-up with pulmonology for further management of Plurex catheter.               {  Unresulted Labs Ordered in the Past 30 Days of this Admission         No orders found from 10/21/2024 to 11/21/2024.                Discharge Disposition  Discharged to home  Condition at discharge: Stable        Hospital Course  Shirley Hendricks is a 64 year old female, on Plavix, with a history of COPD, hypertension, CHF, peripheral artery disease, NSTEMI, stage 4 CKD, and tobacco use who is being admitted for evaluation of shortness of breath.      Acute hypoxic respiratory failure-multifactorial.  Acute exacerbation of heart failure with preserved EF.  Moderate to severe left pleural effusion status post thoracentesis.  -Likely trapped lung with recurrent pleural effusions bilaterally s/p Pleurx catheter placement for trapped lung on the right side  -Presents with 2-day history of increasing shortness of breath and a cough.  On admission chest x-ray shows a large left pleural effusion with compressive atelectasis.  There is also diffuse interstitial opacities consistent with pulmonary edema.    -Labs pertinent for a proBNP of 24659   -Patient underwent bilateral thoracentesis initially on 11/20 and the following day with good improvement in her symptoms.  -Fluid consistent with transudate effusion. Cultures negative. Cytology negative for malignancy  -Completed 5 days of corticosteroid  -TTE done during this hospitalization on 11/22/2024 showed LVEF normal at 55 to 60%.  RV function normal.  Mild 1+ MR, 1+  TR.  Mild pulmonary hypertension.  Compared to prior study there is no significant change  -Completed treatment for community-acquired pneumonia with cefuroxime and azithromycin  -Right heart catheterization done on 11/27/24 showed normal pressures, however this was after several  doses of diuretics.  Weight at that time was close to 104 pounds.  -Patient's oxygenation initially improved with aggressive diuretics and thoracentesis, however ipatient started getting more hypoxic again on 12/3/2024 to a point of needing up to 5 L nasal cannula.  -Repeat chest x-ray performed on 12/3/2024 shows persistent pulmonary edema with ongoing recurrent bilateral pleural effusion.  Weight has gone up to 122 pounds on 12/5/2024  -Discussed with pulmonary and nephrology .  Received one-time dose of Bumex 2 mg on 12/4/2024  -Underwent repeat right-sided thoracentesis with removal of 1.4 L on 12/4/2024 and left-sided thoracentesis on 12/5/2024 with removal of 1.1 L fluid.  -CT chest without contrast was obtained right after left-sided thoracentesis on 12/5/2024 that continued to show significant atelectasis of the right and left lung bases.  There is concern for trapped lung causing recurrent pleural fluid reaccumulation into the empty space.  CT chest also showed a tiny pneumothorax.  Repeat chest x-ray on 12/6/2024 continues to show bibasilar atelectasis.  Likely secondary to thickened mucus  -Aggressive airway clearance with DuoNebs and Mucomyst ordered.  -Incentive spirometry and Aerobika 10 times an hour while awake.  -Pulmonary ordered for BiPAP twice a day to try and recruit the lung and prevent further pleural effusions.  -Tried chest physiotherapy but did not tolerate well.  Will discontinue for now.  -12/8-significant worsening hypoxia with increasing oxygen needs up to 10 L on oxy mask repeated chest CT that shows recurrent large bilateral pleural effusions with right greater than left.  Repeat ultrasound right  thoracentesis with removal of 1500 cc.  Patient felt significantly better after this.  Left-sided thoracentesis being done on 12/9/2024.   - Unfortunately this has been a recurrent problem despite aggressive mucus clearance mechanisms and intermittent BiPAP.  12/10/24-Discussed with pulmonlogist Dr. Vega recommending Pleurx catheter on the right side   - 12/11/24-  Pulmonology did discuss with the patient and his sister about Pleurx catheter placement.  discussed with interventional radiology and Pleurx catheter tomorrow.    12/12-Plurex  cathether  Checked with  Neli and has coverage   Appreciate Thorasic surgery and no indication currently for plurodesis   12/13  -patient discharged home on 2 L of oxygen oxygen assessment done  -Home nursing has been arranged for the patient to help with Pleurx catheter drainage and education and PT  -Patient can use Pleurx catheter as needed to prevent further buildup of fluid  -Continue diuretic dosing torsemide 20 mg twice daily with outpatient follow-up with interventional pulmonology, nephrology and also primary care provider  -Outpatient follow-up with cardiology  -Labs to be drawn by home nurse and follow-up with primary care and further diuretic dosing adjustment based on volume status  -Discussed with nephrology on the day of discharge will continue the current torsemide dosing   -Nebulization machine and oxygen arranged on discharge        community Acquired Pnuemonia  COPD with possible acute exacerbation  -Patient initially presented with acute hypoxic respiratory failure, initially felt to be due to acute exacerbation of heart failure with preserved EF  -Initially she was diuresed, also received thoracentesis with improvement in oxygenation however now continues to require oxygen at 2-3 L  -Right heart cath showed normal pressures, cardiology feels that there is a significant component of COPD which is contributing to her symptoms and  hypoxia  -Already completed 5 days steroid course and a round of antibiotics  -As mentioned above pulmonary following  -Most likely her hypoxia at this point is multifactorial with definite component of heart failure.  COPD is definitely contributing.  -See above management        Hypertensive urgency  New onset A-fib with RVR  Hyperlipidemia  History of coronary artery disease  Type II NSTEMI likely due to demand due to heart failure exacerbation.  -Initially found to be in A-fib, now converted to normal sinus rhythm  -Continue apixaban, currently on 2.5 mg twice a day based on body weight and renal function  -Prior to admission atenolol changed to carvedilol due to refractory hypertension  -Continue amlodipine 10 mg daily  -Continue Plavix which she takes for both history of coronary artery disease and peripheral arterial disease  -Isosorbide mononitrate increased to 120 mg daily  -Continue Crestor  -Continue apixaban  -Patient has blood pressure monitor at home and asked patient to take hydralazine as needed        Hyponatremia  -Sodium decreased from 133 on presentation to 121   -Nephrology following  -Did require 2% saline initially, currently at 133  -Continue 2 L fluid restriction  -Nephrology following  -Daily BMP next  -Sodium 133 on 12/11/2024     Acute kidney injury on CKD stage III  Suspected ATN secondary to hypotension and ARB + diuretic  Borderline hyperkalemia  -Creatinine on admission of 1.71, which worsened with diuresis and relative hypotension.  -Creatinine peaked at 3.78 and now downtrending, good urine output  -Creatinine at 2.41 on 12/4/2024.  Received a diuretic challenge with Bumex 2 mg IV once.  And started on torsemide 20 mg daily which was eventually uptitrated to twice daily.  -Creatinine slowly improving 2.94--141--2.08--2.06-1.95--1.76--1.54.-1.68/-1.57 BUN improving as well.  Nephrology signed off on 12/9/2024  -Outpatient follow-up with repeat labs.     GERD  -Continue with PTA  famotidine     Anemia of Chronic Disease  Baseline hemoglobin has been between 8-9 over the past few months  Hemoglobin currently is 7.9 has been stable in the last 3 days with no active bleeding     Peripheral vascular disease with history of 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 2024  -Continue Plavix and statin  -Hold apixaban for procedure tomorrowHistory of thrombocytopenia  -Platelet count normal on admission, slightly at the moment but stable at 146.     Tobacco use  - Continue with nicotine patch  - Counselled on cessation      Component      Latest Ref Rng 12/7/2024  5:46 AM 12/13/2024  6:50 AM 12/16/2024  1:00 PM   Sodium      135 - 145 mmol/L 129 (L)  138  136    Potassium      3.4 - 5.3 mmol/L 3.9  4.7  4.0    Chloride      98 - 107 mmol/L 93 (L)  98  95 (L)    Carbon Dioxide (CO2)      22 - 29 mmol/L 24  31 (H)  31 (H)    Anion Gap      7 - 15 mmol/L 12  9  10    Urea Nitrogen      8.0 - 23.0 mg/dL 120.5 (H)  69.7 (H)  54.0 (H)    Creatinine      0.51 - 0.95 mg/dL 1.95 (H)  1.53 (H)  1.32 (H)    GFR Estimate      >60 mL/min/1.73m2 28 (L)  37 (L)  44 (L)    Calcium      8.8 - 10.4 mg/dL 7.7 (L)  8.1 (L)  8.3 (L)    Glucose      70 - 99 mg/dL 82  73  79    WBC      4.0 - 11.0 10e3/uL 5.4  3.5 (L)  3.8 (L)    RBC Count      3.80 - 5.20 10e6/uL 3.00 (L)  3.00 (L)  3.11 (L)    Hemoglobin      11.7 - 15.7 g/dL 8.0 (L)  8.3 (L)  8.8 (L)    Hematocrit      35.0 - 47.0 % 24.7 (L)  25.3 (L)  26.9 (L)    MCV      78 - 100 fL 82  84  87    MCH      26.5 - 33.0 pg 26.7  27.7  28.3    MCHC      31.5 - 36.5 g/dL 32.4  32.8  32.7    RDW      10.0 - 15.0 % 14.8  14.8  15.4 (H)    Platelet Count      150 - 450 10e3/uL 133 (L)  146 (L)  202               Since discharge, pt now has a Pleurix catheter right chest and being drained abut every 3-4 days. Breathing better and generally using O2 but was off for 5 hrs yesterday  with O2 sats still 95%  then.  Patient maintaining a low-sodium intake diet.  Currently still on torsemide 20 mg twice a  day.  Has not smoked since November 19.  Using a nicotine patch.  Has not had to use any neb treatments recently.  Patient states she was told she is not a candidate for pleurodesis because her lungs will not fully expand currently.  Denies chest pain or abdominal pain.  Eating okay.  Overall feeling better  Denies seeing blood in stools.  Bowel movements okay  Rare cough.  No fevers or chills     Additional ROS:   Constitutional, HEENT, Cardiovascular, Pulmonary, GI and , Neuro, MSK and Psych review of systems/symptoms are otherwise negative or unchanged from previous, except as noted above.           Objective :  No vitals obtained today    Physical Exam:  GENERAL:  alert and no distress  EYES: Eyes grossly normal to inspection, conjunctivae and sclerae normal  RESP: no audible wheeze, cough, or visible cyanosis.  No visible retractions or increased work of breathing.  Able to speak fully in complete sentences.  Not wearing oxygen currently  NEURO: Cranial nerves grossly intact, mentation intact and speech normal  PSYCH: mentation appears normal, affect normal/bright, judgement and insight intact, normal speech and appearance well-groomed       MED REC REQUIRED{   Post Medication Reconciliation Status:    Discharge medications reconciled, continue medications without change        Vasquez Benoit MD  Internal Medicine Department  [facility]  Internal Medicine Department      (Chart documentation was completed, in part, with Familiar voice-recognition software. Even though reviewed, some grammatical, spelling, and word errors may remain.)

## 2024-12-18 NOTE — TELEPHONE ENCOUNTER
Anila  called for clarification. Relayed message form below.    She stated the skilled nursing was requested for management of the plurex drain.     Still ok for all HC orders as requested from below? OR just ok for PT, OT and SW orders?     Routing to PCP to review and advise.     Please call Anila back with PCPs recommendations.

## 2024-12-18 NOTE — TELEPHONE ENCOUNTER
I am not managing pt's PleuRx. That is being managed by Dr Torres. Any orders specifically related to it needs to be callled to or faxed to and signed by Dr Torres or Interventional Pulmonary if they start seeing the patient. I will manage other issues and give approval for those orders as requested

## 2024-12-18 NOTE — TELEPHONE ENCOUNTER
[facility] Call Center    Phone Message    May a detailed message be left on voicemail: yes     Reason for Call: Other: Per Anila, patient came home with a pleurx drain and Anila is looking for verbal orders on how often they should be draining it. She saw the patient on Monday, 12/16 and drained it then and her next appointment is tomorrow. Please advise at [phone]. Thanks.      Action Taken: Message routed to:  [facility] PULM    Travel Screening: Not Applicable     Date of Service: 12/18/24

## 2024-12-19 NOTE — TELEPHONE ENCOUNTER
Anila is calling back stating they never received the verbal order for patients Pleuryx drain. Anila states she is  needing a call back ASAP at her confidential line and able to leave a detailed message if not answered.

## 2024-12-19 NOTE — CONFIDENTIAL NOTE
LPN called and spoke to the pt.   Pt was assisted to reschedule their appointment in 1/23/25 to 1/21/25 at 1230 pm.     Leonor Hooks LPN

## 2024-12-19 NOTE — TELEPHONE ENCOUNTER
Per Dr. Torres, OK to give verbal orders to drain every other day.     RN spoke with Anila and relayed orders per Dr. Torres. Anila informs that they can mange. RN will also be faxing pleurx supply order form, facesheet, the procedure form, and the most recent H&P (from while in the hospital) to [facility] to accent care. Anila verbalized understanding.    -MARISSA Tran

## 2024-12-26 ENCOUNTER — TELEPHONE (OUTPATIENT)
Dept: PULMONOLOGY | Facility: CLINIC | Age: 64
End: 2024-12-26
Payer: COMMERCIAL

## 2024-12-26 NOTE — TELEPHONE ENCOUNTER
[facility] Call Center    Phone Message    May a detailed message be left on voicemail: yes     Reason for Call: Other: Zunilda from [facility] care called to talk to the care team about patient and her Pleurx as it has been draining more and they feel the volume has increased and they are wondering if they should drain it more often and would like a call back to discuss. Please call back and advise.      Action Taken: Message routed to:  [facility] pulm    Travel Screening: Not Applicable     Date of Service: 12/26/24

## 2024-12-30 ENCOUNTER — TELEPHONE (OUTPATIENT)
Dept: OTHER | Facility: CLINIC | Age: 64
End: 2024-12-30
Payer: COMMERCIAL

## 2024-12-30 NOTE — TELEPHONE ENCOUNTER
[facility]    Who is the name of the provider?:  OSMAN URIOSTEGUI   What is the location you see this provider at/preferred location?: [place]  Person calling / Facility: Shirley Hendricks  Phone number:  [phone] (home)   Nurse call back needed:  Yes     Reason for call:  Patient's amputated leg is starting to swell up very bad, patient is concerned. Patient is able to send a picture if needed.    Pharmacy location:  [id number] - [place] [address]  Outside Imaging: n/a   Can we leave a detailed message on this number?  YES     12/30/2024, 1:29 PM

## 2024-12-30 NOTE — TELEPHONE ENCOUNTER
"RN called and spoke with pt. Pt reports she has had fluid build up in her lungs. Has a drainage tube she has to drain every 3 days. Pt is now experiencing swelling in her right leg. Pt states she noticed stump is getting \"really fat\" and is wondering if normal. Noticed this 3 days ago. No discoloration. No drainage. Had a nurse come out to drain her lungs and showed her the stump. Leg doesn't indent when she presses to check for edema. Pt's Home Health Nurse recommended pt follow up with Vascular Surgeon since being released from rehab she hasn't seen Dr. Lozano or Dr. Hernandez. RN asked pt if she is experiencing any pain. Pt reports she has \"the normal pain where she has to take 1 extra Gabapentin\" Denies chest pain or SOB. Can palpate her pulse in her groin area. States she has difficulty with elevating her RLE. Pt will send a photo via [employer]. Will route to scheduling to assist with getting pt scheduled for imaging and appt with Dr. Lozano as pt is due for follow up.    Routing to scheduling to coordinate the following:    US RLE Arterial Duplex prior to in clinic visit with Dr. Lozano  In Clinic visit with Dr. Lozano  Please schedule this within the next week. Note that Dr. Lozano has openings on 1/2/25.     Appt note: follow up to visit 1/4/24, discuss imaging results, RLE swelling concerns    Leonor Blackburn RN  [facility] [place]            "

## 2024-12-30 NOTE — TELEPHONE ENCOUNTER
Noted.    Leonor Blackburn RN  [facility]

## 2024-12-30 NOTE — TELEPHONE ENCOUNTER
Patient is scheduled for imaging 1/6/25 and follow up with Dr Lozano 1/9/24. Patient is unable to come 1/2/25 as she needs more notice to schedule a ride with an oxygen tank. Routing to RN Triage as an FYI.

## 2024-12-31 NOTE — TELEPHONE ENCOUNTER
Per Dr. Torres- ok to drain daily. He also noted that she should be managed by IP.    Writer returned call to home RN to relay the message.

## 2025-01-02 DIAGNOSIS — I10 BENIGN ESSENTIAL HYPERTENSION: ICD-10-CM

## 2025-01-02 RX ORDER — AMLODIPINE BESYLATE 10 MG/1
10 TABLET ORAL DAILY
Qty: 90 TABLET | Refills: 3 | Status: SHIPPED | OUTPATIENT
Start: 2025-01-02

## 2025-01-05 PROBLEM — J96.01 ACUTE RESPIRATORY FAILURE WITH HYPOXIA (H): Status: RESOLVED | Noted: 2024-11-20 | Resolved: 2025-01-05

## 2025-01-06 ENCOUNTER — TELEPHONE (OUTPATIENT)
Dept: OTHER | Facility: CLINIC | Age: 65
End: 2025-01-06
Payer: COMMERCIAL

## 2025-01-06 ENCOUNTER — HOSPITAL ENCOUNTER (OUTPATIENT)
Dept: ULTRASOUND IMAGING | Facility: CLINIC | Age: 65
Discharge: HOME | End: 2025-01-06
Attending: SURGERY
Payer: COMMERCIAL

## 2025-01-06 ENCOUNTER — HOSPITAL ENCOUNTER (OUTPATIENT)
Dept: CT IMAGING | Facility: CLINIC | Age: 65
Discharge: HOME | End: 2025-01-06
Attending: NURSE PRACTITIONER
Payer: COMMERCIAL

## 2025-01-06 DIAGNOSIS — I73.9 PAD (PERIPHERAL ARTERY DISEASE): ICD-10-CM

## 2025-01-06 PROCEDURE — 71250 CT THORAX DX C-: CPT

## 2025-01-06 PROCEDURE — 93925 LOWER EXTREMITY STUDY: CPT

## 2025-01-06 NOTE — PROGRESS NOTES
[facility]    Shirley Hendricks returns for follow-up of her multiple vascular problems.  Previous aortobifemoral bypass graft and left femoral-popliteal in situ bypass graft there is remained widely patent.  Had multiple bypass procedures to her right leg which eventually occluded and required right AKA.    --3/26/2010: Aortobifemoral bypass graft.  Left femoral to popliteal in situ  --5/11/2016: Right CEA with distal facial vein patch for symptomatic stenosis  --6/8/2020: CT with distal facial vein patch for symptomatic  --12/28/22 left femoral pseudoaneurysm    PMH: History Charcot-Breonna-Tooth foot deformities, mild COPD, hyperlipidemia on statin, hypertension, B-cell lymphoma    Patient is at a very difficult year.  Has had multiple hospitalizations.  Right BKA is healed over well including open ulceration where the graft was removed.  She has recurrent pleural effusions on the right and now has a drainage tube device and avoids the frequent pleurocentesis that was required.  She is on home oxygen and breathing is fairly good with this.    Exam: Alert and appropriate. Here with her daughter  In a wheelchair with her home oxygen--breathing easily  Blood pressure 179/75.  Pulse 64  Chest= clear with no wheezing  Well-healed right AKA stump.  Previous swelling is resolved.   Ulcerated area over the anterior portion is completely healed over-    Looks good.   +3 femoral pulses bilaterally.   +3 left graft pulse.    Duplex today reveals a widely patent left common femoral to below-knee popliteal in situ bypass graft.  No evidence of recurrent left femoral pseudoaneurysm following repair.            Right side confirms occlusion of graft.  No issues with common femoral artery.       Carotid duplex reveals a widely patent left CEA.  Stable stenosis of right distal common carotid artery 67%.  Widely patent carotid bulb and ICA.    Carotid duplex was also performed.      IMPRESSION:   #1.  Widely  patent aortobifemoral bypass graft.  Patent left femoral to popliteal in situ.  Follow-up exam in 1 year.    #2.  Patent bilateral carotid endarterectomies.  The CCA right narrowing is of no clinical concern.  Follow--up exam in 1 year.    #3.  Well-healed right AKA.    #4.  Significant pulmonary issues.  1/6/2025 CT chest reveals a moderate right and left pleural effusion with the right Pleurx catheter in appropriate position.    25 minutes with patient and family today.    Chadwick Lozano MD   This note was created using Dragon voice recognition software which may result in transcription errors.

## 2025-01-06 NOTE — PATIENT INSTRUCTIONS
Follow-up with cardiology in 2 days as scheduled  Patient to contact Dr Blackburn at [facility] to confirm time of upcoming follow-up appointment  Continue current medications.  Remain off of cigarettes.  May use nicotine patches as needed   Pleurx management per pulmonary  Call  [phone] or use Mychart to schedule a future lab appointment  non-fasting in 1 month.

## 2025-01-06 NOTE — TELEPHONE ENCOUNTER
RN received call from [facility] with US. Pt is currently getting US RLE arterial duplex. Krysta is wondering if this is an incorrect order? She started the exam and noted that bypass was done and what was left was ligated or occluded. Pt has an amputation above the knee and Krysta is wondering in specific what Dr. Lozano is looking for.      RN sent message to Dr. Lozano to advise.      Leonor Blackburn RN  [facility]

## 2025-01-09 ENCOUNTER — TRANSCRIBE ORDERS (OUTPATIENT)
Dept: OTHER | Age: 65
End: 2025-01-09

## 2025-01-09 ENCOUNTER — MEDICAL CORRESPONDENCE (OUTPATIENT)
Dept: HEALTH INFORMATION MANAGEMENT | Facility: CLINIC | Age: 65
End: 2025-01-09

## 2025-01-09 ENCOUNTER — OFFICE VISIT (OUTPATIENT)
Dept: OTHER | Facility: CLINIC | Age: 65
End: 2025-01-09
Attending: SURGERY
Payer: COMMERCIAL

## 2025-01-09 ENCOUNTER — TRANSFERRED RECORDS (OUTPATIENT)
Dept: HEALTH INFORMATION MANAGEMENT | Facility: CLINIC | Age: 65
End: 2025-01-09

## 2025-01-09 VITALS — SYSTOLIC BLOOD PRESSURE: 179 MMHG | DIASTOLIC BLOOD PRESSURE: 75 MMHG | HEART RATE: 64 BPM

## 2025-01-09 DIAGNOSIS — R09.89 OTHER SPECIFIED SYMPTOMS AND SIGNS INVOLVING THE CIRCULATORY AND RESPIRATORY SYSTEMS: ICD-10-CM

## 2025-01-09 DIAGNOSIS — I73.9 PAD (PERIPHERAL ARTERY DISEASE): ICD-10-CM

## 2025-01-09 DIAGNOSIS — Z98.890 HISTORY OF BILATERAL CAROTID ENDARTERECTOMY: Primary | ICD-10-CM

## 2025-01-09 DIAGNOSIS — C85.80 MARGINAL ZONE LYMPHOMA (H): Primary | ICD-10-CM

## 2025-01-09 PROCEDURE — G0463 HOSPITAL OUTPT CLINIC VISIT: HCPCS | Performed by: SURGERY

## 2025-01-09 NOTE — PROGRESS NOTES
[facility]        Patient is here for a follow up.    Pt is currently taking Statin, Plavix, and Eliquis.    /75 (BP Location: Right arm, Patient Position: Sitting, Cuff Size: Adult Regular)   Pulse 64   LMP  (LMP Unknown)     The provider has been notified that the patient has no concerns.     Questions patient would like addressed today are: N/A.    Refills are needed: N/A    Has homecare services and agency name:  Yes     Patient has been identified as a fall risk.    Interventions were completed as noted below:  [x]Exam tables/chairs remained in the lowest position.   []Patient remained in wheelchair.    []Provider requested patient in exam chair.  Patient required assist and use of transfer belt.  [x]Exam room door remained open unless with a provider.  []A bell provided to patient to notify staff if assistance was needed.  [x]Provider notified patient was identified as a fall risk.      Geri Muhammad MA

## 2025-01-10 ENCOUNTER — TELEPHONE (OUTPATIENT)
Dept: INTERNAL MEDICINE | Facility: CLINIC | Age: 65
End: 2025-01-10
Payer: COMMERCIAL

## 2025-01-10 NOTE — TELEPHONE ENCOUNTER
FYI - Status Update    Who is Calling: patient    Update: Pt is not happy with the home care service ([facility]) that she currently has. Would like a referral placed to [facility] for home care PT services.     Pt states that the [facility] is more helpful.     Does caller want a call/response back: Yes     Could we send this information to you in Personal or would you prefer to receive a phone call?:   Patient would prefer a phone call   Okay to leave a detailed message?: Yes at Home number on file [phone] (home)

## 2025-01-14 ENCOUNTER — MYC MEDICAL ADVICE (OUTPATIENT)
Dept: INTERNAL MEDICINE | Facility: CLINIC | Age: 65
End: 2025-01-14
Payer: COMMERCIAL

## 2025-01-14 ENCOUNTER — TELEPHONE (OUTPATIENT)
Dept: INTERNAL MEDICINE | Facility: CLINIC | Age: 65
End: 2025-01-14
Payer: COMMERCIAL

## 2025-01-14 DIAGNOSIS — I50.9 ACUTE ON CHRONIC HEART FAILURE, UNSPECIFIED HEART FAILURE TYPE (H): ICD-10-CM

## 2025-01-14 DIAGNOSIS — Z53.9 DIAGNOSIS NOT YET DEFINED: Primary | ICD-10-CM

## 2025-01-14 PROCEDURE — G0180 MD CERTIFICATION HHA PATIENT: HCPCS | Performed by: INTERNAL MEDICINE

## 2025-01-14 PROCEDURE — 99207 PR MD CERTIFICATION HHA PATIENT: CPT | Performed by: INTERNAL MEDICINE

## 2025-01-14 NOTE — TELEPHONE ENCOUNTER
Home Care is calling regarding an established patient with [facility].       Requesting orders from: Vasquez Benoit  Provider is following patient: Yes  Is this a 60-day recertification request?  No    Orders Requested    Physical Therapy  Request for initial certification (first set of orders)   Frequency:  1w3 1 every 2 weeks for 4 weeks then 1w1       OT EVAL as well requested, gave verbal.          Confirmed ok to leave a detailed message with call back.  Contact information confirmed and updated as needed.    Elizabeth Barry RN

## 2025-01-16 NOTE — TELEPHONE ENCOUNTER
Tanya called the clinic and provider's message was relayed to her. She agreed with the plan and had no further questions.     Sarika JIMENEZ RN  [facility] Triage Team

## 2025-01-18 RX ORDER — TORSEMIDE 20 MG/1
20 TABLET ORAL
Qty: 60 TABLET | Refills: 3 | Status: SHIPPED | OUTPATIENT
Start: 2025-01-18

## 2025-01-20 ENCOUNTER — PATIENT OUTREACH (OUTPATIENT)
Dept: ONCOLOGY | Facility: CLINIC | Age: 65
End: 2025-01-20
Payer: COMMERCIAL

## 2025-01-20 ENCOUNTER — TELEPHONE (OUTPATIENT)
Dept: PHYSICAL MEDICINE AND REHAB | Facility: CLINIC | Age: 65
End: 2025-01-20
Payer: COMMERCIAL

## 2025-01-20 ENCOUNTER — TRANSCRIBE ORDERS (OUTPATIENT)
Dept: ONCOLOGY | Facility: CLINIC | Age: 65
End: 2025-01-20
Payer: COMMERCIAL

## 2025-01-20 DIAGNOSIS — J90 PLEURAL EFFUSION: Primary | ICD-10-CM

## 2025-01-20 NOTE — PROGRESS NOTES
"Per NPS (new patient scheduling) team:  Jojo Pizano, patient states she will not go to [facility], as she is on oxygen, and will not \"cart around [facility] oxygen\" [facility], and needs to get transportation....     NPS (new patient scheduling) team assisted and we will have her get a CXR on MON 3/3 and then do a virtual appt on TU 3/4 w/Dr. Rene Mitchell.    Everyone notified by IB  "

## 2025-01-20 NOTE — CONFIDENTIAL NOTE
Pre-visit phone call-       LPN called pt and reminded them of their upcoming appointment on Tuesday 1/21/25 at 1230 with Dr. Robbins in the [facility] clinic.   Pt stated they needed to cancel appointment, pt is not feeling well.   Pt declined to reschedule a this time. LPN will call the pt again at a later time to assist them to schedule another appointment.     Appointment cancelled, prosthetics team notified.     Leonor Hooks LPN

## 2025-01-20 NOTE — PROGRESS NOTES
Dr. Rene Mitchell, IP (Interventional Pulmonology) provider has agreed to see patient.  ~~~~~~~~~~~~~~~~~~~~~~~~~~~~~~~~~    New Patient: Interventional Pulmonary (Lung nodule) Nurse Navigator Note     Referring provider: Yoli Huizar, [facility]     Referred to (specialty): Interventional Pulmonary (Lung nodule)     Requested provider (if applicable): n/a     Date Referral Received: 12/13/2024     Evaluation for : trapped lung has pleurex cathether     Clinical History (per Nurse review of records provided):    **BOOK MARKED**     12/12/2024:  IR consult and procedure note  12/11/2024:  Cardiothoracic referral     Records Location: [facility]      Records Needed: none     Additional testing needed prior to consult: CXR

## 2025-01-21 ENCOUNTER — TELEPHONE (OUTPATIENT)
Dept: PHYSICAL MEDICINE AND REHAB | Facility: CLINIC | Age: 65
End: 2025-01-21

## 2025-01-21 NOTE — CONFIDENTIAL NOTE
First message-   LPN called pt and left VM. Pt was asked to call back when available to schedule a follow up appointment with Dr. Robbins.     Pt was scheduled on 1/21/25 but cancelled as they were not feeling well.     LPN will try pt again at a later time.     Leonor Hooks LPN

## 2025-01-23 ENCOUNTER — TELEPHONE (OUTPATIENT)
Dept: INTERNAL MEDICINE | Facility: CLINIC | Age: 65
End: 2025-01-23

## 2025-01-23 NOTE — TELEPHONE ENCOUNTER
Home Care is calling regarding an established patient with [facility].       Requesting orders from: Vasquez Benoit  Provider is following patient: Yes  Is this a 60-day recertification request?  No    Orders Requested    Occupational Therapy  Request for initial certification (first set of orders)   Frequency:  one week two visits; then one visit every other week for 4 weeks focus on oxygen management    Information was gathered and will be sent to provider for review.  RN will contact Home Care with information after provider review.  Confirmed ok to leave a detailed message with call back.  Contact information confirmed and updated as needed.    Alexia Hoffman, RN

## 2025-01-23 NOTE — TELEPHONE ENCOUNTER
RECORDS STATUS - ALL OTHER DIAGNOSIS      RECORDS RECEIVED FROM: [facility], External - [place] [facility]   NOTES STATUS DETAILS   OFFICE NOTE from referring provider Epic Dr. Yoli Huizar   OFFICE NOTE from medical oncologist External - [facility]  [facility] [place] [facility]:  1/9/2025 - Dr. Patrick Dreyer    Epic:  10/6/2016 - Dr. Nichelle Knox   DISCHARGE SUMMARY from hospital [facility] 11/20/2024, 7/30/2024, 6/21/2024 - [facility]    MEDICATION LIST [facility]    LABS     PATHOLOGY REPORTS [facility] 11/21/2024 - [id number]   6/21/2024 - [id number]    ANYTHING RELATED TO DIAGNOSIS Epic 12/16/2024   IMAGING (NEED IMAGES & REPORT)     CT SCANS PACS CT CAP: 1/6/2025-10/7/2023  CT Chest: 12/8/2024-6/21/2024   XRAYS PACS Xray Chest: 12/6/2024-2/16/2020   ULTRASOUND PACS US Thoracentesis: 12/9/2024-6/21/2024

## 2025-01-23 NOTE — TELEPHONE ENCOUNTER
Physical therapy assistant calling to inform of patient's elevated systolic blood pressure. Patient had no symptoms. Reports that heart rate and oxygen saturation were WNL. Blood pressure at 10:30 am today 172/80. Recheck in 5 minutes 168/80.    Patient has PRN blood pressure medication is systolic is over 180.    Patient does not regularly check her own BP. Patient only checks if light headed or dizzy.    OT is seeing patient today at 2 PM.    Routing as FYI. Please advise if further triage needed.  Isha Owens RN

## 2025-01-26 ENCOUNTER — HEALTH MAINTENANCE LETTER (OUTPATIENT)
Age: 65
End: 2025-01-26

## 2025-01-28 NOTE — CONFIDENTIAL NOTE
Second VM left-   LPN called and left another VM for the pt asking that they call back to schedule their follow up appointment with Dr. Robbins when available.     Leonor Hooks LPN

## 2025-01-31 ENCOUNTER — TELEPHONE (OUTPATIENT)
Dept: INTERNAL MEDICINE | Facility: CLINIC | Age: 65
End: 2025-01-31
Payer: COMMERCIAL

## 2025-01-31 NOTE — TELEPHONE ENCOUNTER
JOHNSON~    Per Physical Therapist Assistant, [facility]:    Patient update:    Before exercises: /80, P58    After exercises: /85, P59    -Asymptomatic    -Did take hydralazine 50mg    Reference:

## 2025-02-02 ENCOUNTER — MEDICAL CORRESPONDENCE (OUTPATIENT)
Dept: HEALTH INFORMATION MANAGEMENT | Facility: CLINIC | Age: 65
End: 2025-02-02

## 2025-02-02 DIAGNOSIS — Z53.9 DIAGNOSIS NOT YET DEFINED: Primary | ICD-10-CM

## 2025-02-02 PROCEDURE — 99207 PR MD CERTIFICATION HHA PATIENT: CPT | Performed by: INTERNAL MEDICINE

## 2025-02-02 PROCEDURE — G0180 MD CERTIFICATION HHA PATIENT: HCPCS | Performed by: INTERNAL MEDICINE

## 2025-02-04 NOTE — TELEPHONE ENCOUNTER
Noted. Has follow-up appt with me later this month and will reassess BP at that time with same meds

## 2025-02-04 NOTE — TELEPHONE ENCOUNTER
Have better communication through [facility] with electronic medical record rather than doing everything with paperwork signing, etc. Will therefore continue with same for now and discussed this with patient further at her follow-up appointment with me 2/26/2025

## 2025-02-06 DIAGNOSIS — M79.2 NEUROPATHIC PAIN: Primary | ICD-10-CM

## 2025-02-11 RX ORDER — GABAPENTIN 100 MG/1
CAPSULE ORAL
Qty: 180 CAPSULE | Refills: 1 | Status: SHIPPED | OUTPATIENT
Start: 2025-02-11

## 2025-02-12 ENCOUNTER — TELEPHONE (OUTPATIENT)
Dept: INTERNAL MEDICINE | Facility: CLINIC | Age: 65
End: 2025-02-12
Payer: COMMERCIAL

## 2025-02-12 DIAGNOSIS — I10 ESSENTIAL HYPERTENSION: ICD-10-CM

## 2025-02-12 DIAGNOSIS — I48.20 CHRONIC ATRIAL FIBRILLATION (H): ICD-10-CM

## 2025-02-12 RX ORDER — ISOSORBIDE MONONITRATE 120 MG/1
120 TABLET, EXTENDED RELEASE ORAL DAILY
Qty: 90 TABLET | Refills: 3 | Status: SHIPPED | OUTPATIENT
Start: 2025-02-12

## 2025-02-12 RX ORDER — CARVEDILOL 12.5 MG/1
12.5 TABLET ORAL 2 TIMES DAILY WITH MEALS
Qty: 180 TABLET | Refills: 3 | Status: SHIPPED | OUTPATIENT
Start: 2025-02-12

## 2025-02-12 NOTE — TELEPHONE ENCOUNTER
Marleen (RN [facility]) called asking for ok for PRN visit this week for a lab draw? They were unable to get labs at yesterdays visit.     Routing to PCP to review and advise.     Please call Marleen back with PCPs recommendations.

## 2025-02-12 NOTE — TELEPHONE ENCOUNTER
Dr. Benoit - please advise.    Pt's pharmacist calling for ASAP assistance with 3 refills, see pended orders and pharmacy for review.    Ashley Rodrigues RN

## 2025-02-17 ENCOUNTER — LAB REQUISITION (OUTPATIENT)
Dept: LAB | Facility: CLINIC | Age: 65
End: 2025-02-17
Payer: COMMERCIAL

## 2025-02-17 DIAGNOSIS — N18.4 CHRONIC KIDNEY DISEASE, STAGE 4 (SEVERE) (H): ICD-10-CM

## 2025-02-17 LAB
BASOPHILS # BLD AUTO: 0.1 10E3/UL (ref 0–0.2)
BASOPHILS NFR BLD AUTO: 1 %
EOSINOPHIL # BLD AUTO: 0.1 10E3/UL (ref 0–0.7)
EOSINOPHIL NFR BLD AUTO: 3 %
ERYTHROCYTE [DISTWIDTH] IN BLOOD BY AUTOMATED COUNT: 18.6 % (ref 10–15)
HCT VFR BLD AUTO: 39.6 % (ref 35–47)
HGB BLD-MCNC: 12.4 G/DL (ref 11.7–15.7)
IMM GRANULOCYTES # BLD: 0 10E3/UL
IMM GRANULOCYTES NFR BLD: 0 %
LYMPHOCYTES # BLD AUTO: 1.2 10E3/UL (ref 0.8–5.3)
LYMPHOCYTES NFR BLD AUTO: 23 %
MCH RBC QN AUTO: 27.9 PG (ref 26.5–33)
MCHC RBC AUTO-ENTMCNC: 31.3 G/DL (ref 31.5–36.5)
MCV RBC AUTO: 89 FL (ref 78–100)
MONOCYTES # BLD AUTO: 0.4 10E3/UL (ref 0–1.3)
MONOCYTES NFR BLD AUTO: 7 %
NEUTROPHILS # BLD AUTO: 3.6 10E3/UL (ref 1.6–8.3)
NEUTROPHILS NFR BLD AUTO: 66 %
NRBC # BLD AUTO: 0 10E3/UL
NRBC BLD AUTO-RTO: 0 /100
PLATELET # BLD AUTO: 191 10E3/UL (ref 150–450)
RBC # BLD AUTO: 4.45 10E6/UL (ref 3.8–5.2)
WBC # BLD AUTO: 5.4 10E3/UL (ref 4–11)

## 2025-02-17 PROCEDURE — 85025 COMPLETE CBC W/AUTO DIFF WBC: CPT | Mod: ORL | Performed by: PHYSICIAN ASSISTANT

## 2025-02-17 PROCEDURE — 36415 COLL VENOUS BLD VENIPUNCTURE: CPT | Mod: ORL | Performed by: PHYSICIAN ASSISTANT

## 2025-02-18 DIAGNOSIS — N18.32 CHRONIC KIDNEY DISEASE (CKD) STAGE G3B/A1, MODERATELY DECREASED GLOMERULAR FILTRATION RATE (GFR) BETWEEN 30-44 ML/MIN/1.73 SQUARE METER AND ALBUMINURIA CREATININE RATIO LESS THAN 30 MG/G (H): Primary | ICD-10-CM

## 2025-02-18 DIAGNOSIS — N25.81 SECONDARY HYPERPARATHYROIDISM OF RENAL ORIGIN: ICD-10-CM

## 2025-02-20 RX ORDER — DENOSUMAB 60 MG/ML
INJECTION SUBCUTANEOUS
COMMUNITY

## 2025-02-20 RX ORDER — TRIAMCINOLONE ACETONIDE 5 MG/G
OINTMENT TOPICAL
COMMUNITY

## 2025-02-20 RX ORDER — CEPHALEXIN 500 MG/1
CAPSULE ORAL
COMMUNITY

## 2025-02-20 RX ORDER — BETAMETHASONE DIPROPIONATE 0.5 MG/G
CREAM TOPICAL
COMMUNITY

## 2025-02-20 RX ORDER — LISINOPRIL 5 MG/1
TABLET ORAL
COMMUNITY

## 2025-02-20 RX ORDER — B COMPLEX, C NO.20/FOLIC ACID 1 MG
1 CAPSULE ORAL
COMMUNITY
Start: 2024-11-14 | End: 2025-11-14

## 2025-02-20 RX ORDER — SENNOSIDES A AND B 8.6 MG/1
TABLET, FILM COATED ORAL
COMMUNITY

## 2025-02-21 ENCOUNTER — VIRTUAL VISIT (OUTPATIENT)
Dept: INTERNAL MEDICINE | Facility: CLINIC | Age: 65
End: 2025-02-21
Payer: COMMERCIAL

## 2025-02-21 DIAGNOSIS — N18.32 STAGE 3B CHRONIC KIDNEY DISEASE (H): ICD-10-CM

## 2025-02-21 DIAGNOSIS — I10 ESSENTIAL HYPERTENSION: Primary | ICD-10-CM

## 2025-02-21 DIAGNOSIS — R26.9 ABNORMAL GAIT: ICD-10-CM

## 2025-02-21 DIAGNOSIS — S78.111A UNILATERAL AKA, RIGHT (H): ICD-10-CM

## 2025-02-21 PROBLEM — T45.4X5D ADVERSE EFFECT OF IRON AND ITS COMPOUNDS, SUBSEQUENT ENCOUNTER: Status: ACTIVE | Noted: 2025-02-21

## 2025-02-21 PROCEDURE — 98006 SYNCH AUDIO-VIDEO EST MOD 30: CPT | Performed by: INTERNAL MEDICINE

## 2025-02-21 NOTE — PROGRESS NOTES
Shirley is a 64 year old who is being evaluated via a billable video visit.    How would you like to obtain your AVS? [employer]  If the video visit is dropped, the invitation should be resent by: Text to cell phone: [phone]  Will anyone else be joining your video visit? No       ASSESSMENT:    1. Essential hypertension (Primary)  Needs improved control.   Explained to the patient the need for consistent blood pressure control by taking hydralazine 25 mg 3 times a day scheduled as recently ordered rather than as needed at night only as patient currently doing.  Continue other blood pressure medications.  Patient will monitor blood pressures and update me through [employer] in 1 week    2. Unilateral AKA, right (H)  Affecting gait and ability to do ADLs.  Has been seen by home care occupational therapy was recommended patient start using a light weight wheelchair.  Patient states forms to sign are being sent to [facility]. Will await these and address    3. Abnormal gait   See #2    4. Stage 3b chronic kidney disease (H)  Last recorded GFR from 12/16/2024 was 44 and improving.  Prior anemia resolved with last hemoglobin 12.4.  Will await follow-up renal labs as ordered by nephrology to be done next week      PLAN:  Take hydralazine 25 mg tablet, 1 tablet 3 times a day scheduled for blood pressure  Continue other medications  Nonfasting labs to be drawn by home care as ordered by nephrology in 1 week  Will await paperwork from occupational therapy regarding orders to be signed for new lightweight wheelchair to improve mobility in patient's home      Video-Visit Details    Type of service:  Video Visit    Video Start Time: 4:28pm    Video End Time:4:52pm    Originating Location (pt. Location): Home    Distant Location (provider location):  [facility]     Platform used for Video Visit: Debby              Vishal Watson is a 64 year old, presenting for the following health  issues:  Hypertension (Follow up) and Recheck Medication         History of Present Illness       Hypertension: She presents for follow up of hypertension.  She does check blood pressure  regularly outside of the clinic. Outside blood pressures have been over 140/90. She follows a low salt diet.     She eats 2-3 servings of fruits and vegetables daily.She consumes 0 sweetened beverage(s) daily.She exercises with enough effort to increase her heart rate 10 to 19 minutes per day.  She exercises with enough effort to increase her heart rate 3 or less days per week.   She is taking medications regularly.     Most recent lab results reviewed with pt.      HPI:  History hypertension.  Patient reports blood pressures at home with systolic 130-150 in the morning and 160-180 in the evening.  Patient had been instructed to take hydralazine 25 mg 3 times a day but has only been taking it at night.  Denies chest pain, headaches, abdominal pain.  Breathing is improved.  No longer using oxygen.  Still has Pleurodx catheter which is draining 800-1000 mL every 5 to 6 days which is improved as patient would previously drain that much fluid about every 2 days from right chest pleural space.  Recently had virtual visit with nephrology 2/18/2025.  Note reviewed.  Will be having follow-up labs through them next week for CKD3B.  Previous anemia resolved.  Hx AKA right side. Using WC and needs new lightweight WC to help with maneuvering within her home. States [facility] OT as done evaluation and faxing paperwork to the clinic to be signed     Additional ROS:   Constitutional, HEENT, Cardiovascular, Pulmonary, GI and , Neuro, MSK and Psych review of systems/symptoms are otherwise negative or unchanged from previous, except as noted above.           Objective :  No blood pressure results available  from today.  See above regarding recent blood pressure range at home    Physical Exam:  GENERAL:  alert and no distress  EYES: Eyes grossly normal to  inspection, conjunctivae and sclerae normal  RESP: no audible wheeze, cough, or visible cyanosis.  No visible retractions or increased work of breathing.  Able to speak fully in complete sentences.  NEURO: Cranial nerves grossly intact, mentation intact and speech normal  PSYCH: mentation appears normal, affect normal/bright, judgement and insight intact, normal speech and appearance well-groomed       Vasquez Benoit MD  Internal Medicine Department  [facility]  Internal Medicine Department      (Chart documentation was completed, in part, with [employer] voice-recognition software. Even though reviewed, some grammatical, spelling, and word errors may remain.)

## 2025-02-23 ENCOUNTER — HEALTH MAINTENANCE LETTER (OUTPATIENT)
Age: 65
End: 2025-02-23

## 2025-03-03 ENCOUNTER — ANCILLARY PROCEDURE (OUTPATIENT)
Dept: GENERAL RADIOLOGY | Facility: CLINIC | Age: 65
End: 2025-03-03
Attending: INTERNAL MEDICINE
Payer: COMMERCIAL

## 2025-03-03 DIAGNOSIS — J90 PLEURAL EFFUSION: ICD-10-CM

## 2025-03-03 PROCEDURE — 71046 X-RAY EXAM CHEST 2 VIEWS: CPT | Mod: TC | Performed by: RADIOLOGY

## 2025-03-04 ENCOUNTER — PRE VISIT (OUTPATIENT)
Dept: PULMONOLOGY | Facility: CLINIC | Age: 65
End: 2025-03-04
Payer: COMMERCIAL

## 2025-03-04 ENCOUNTER — VIRTUAL VISIT (OUTPATIENT)
Dept: PULMONOLOGY | Facility: CLINIC | Age: 65
End: 2025-03-04
Attending: INTERNAL MEDICINE
Payer: COMMERCIAL

## 2025-03-04 DIAGNOSIS — J90 PLEURAL EFFUSION: ICD-10-CM

## 2025-03-04 PROCEDURE — 98002 SYNCH AUDIO-VIDEO NEW MOD 45: CPT | Performed by: INTERNAL MEDICINE

## 2025-03-04 PROCEDURE — 1125F AMNT PAIN NOTED PAIN PRSNT: CPT | Performed by: INTERNAL MEDICINE

## 2025-03-04 ASSESSMENT — PAIN SCALES - GENERAL: PAINLEVEL_OUTOF10: SEVERE PAIN (7)

## 2025-03-04 NOTE — PROGRESS NOTES
"    LUNG NODULE & INTERVENTIONAL PULMONARY CLINIC  CL[facility]ICS [facility], [facility], [facility] CANCER [address] [address]  Phone: [phone]  Fax: [phone]    Patient:  Shirley Hendricks, Age 64 year old, Date of birth 1960, MRN# [id number]  Date of Visit:  03/04/2025  Referring Provider Referred Self    Reason for Consultation: Pleural Effusion     The patient has been notified of following:   \"This video visit will be conducted via a call between you and your physician/provider. We have found that certain health care needs can be provided without the need for an in-person physical exam.  This service lets us provide the care you need with a video conversation.  If a prescription is necessary we can send it directly to your pharmacy.  If lab work is needed we can place an order for that and you can then stop by our lab to have the test done at a later time.  Video visits are billed at different rates depending on your insurance coverage.  Please reach out to your insurance provider with any questions.  If during the course of the call the physician/provider feels a video visit is not appropriate, you will not be charged for this service.\"  Patient has given verbal consent for Video visit? Yes  How would you like to obtain your AVS? Please refer to rooming staff note  Patient would like the video invitation sent by: Please refer to rooming staff note   Will anyone else be joining your video visit? Please refer to rooming staff note    Video-Visit Details     Type of service:  Video Visit  Video Start Time: 140  Video End Time: 202  Provider Name: Rene Mitchell MD, A   Originating Location (pt. Location): Home  Provider Location: Off campus   Distant Location (provider location): Home/Clinic  Platform used for Video Visit: AmWell/Doximity    Assessment and Plan:    1. Bilateral pleural effusions presumed " 2/2 CHF and CKD s/p right pleurx placement. Pleurx placed by IR and she is frustrated that she has had no directions since then. She is draining based on sx, which is about 9days and drains 890cc. I discussed with her that she needs to discuss fluid management with her cardiology and nephrologist in order to be at a point to consider removing the tube. She is frustrated at the deductible cost of the bottles and was not told that this would be a chronic situation. I told her to [place] back with her cardiologist and nephrologist to discuss. Plan to see back prn if fluid drainage is minimal for 2 consecutive days.     Billing: I spent a total of 45min spent on date of encounter which includes prep time, visit with the patient and post visit work including documentation and nursing communication      Complexity Add-on Statement: The longitudinal plan of care for the diagnosis(es)/condition(s) as documented were addressed during this visit. Due to the added complexity in care, I will continue to support Shirley in the subsequent management and with ongoing continuity of care.    Rene Mitchell MD, MHA  Associate Professor of Medicine  Section of Interventional Pulmonology   Division of Pulmonary, Allergy, Critical Care and Sleep Medicine   [facility]  Pager: [phone]   Office: [phone]  Email: [email or web address]    Eleni Severino RN   Interventional Pulmonary Care Coordinator   Office: [phone]  Email: [email or web address]     Salo Rodrigues  Interventional Pulmonary Surgery Scheduler   Office: [phone]  Email: [email or web address]         History:     Shirley Hendricks is a 64 year old female with sig h/o for CAD, CHF, COPD, PAD, CKD who is here for evaluation/followup of Pleural Effusion.    - No new resp sx or complaints. Denies dyspnea or cough.   - Had right pleurx placed by IR on 12/12/24. Drains only when symptomatic ~once/9 days. Last drainage  amount was 890cc. Most recent TTE showed EF of 55-60%. Last SCr was 1.36.   - Personal hx of cancer: no  - Family hx of cancer: no  - Exposure hx: Denies asbestos or radon exposure   - Tobacco hx: Current Smoker, 0.25ppd, >30pkyr.   - My interpretation of the images relevant for this visit includes: bilateral pleural effusions   - My interpretation of the PFT's relevant for this visit includes: None     CXR with bilateral pleural effusions L>R. Right pleurx is in place    Other active medical problems include:   - h/o CHF, CAD. Follows with cardiology   - h/o CKD. 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 clot    Post-menopausal     Reflux esophagitis 02/2004    EGD: esophagitis and medium HH    SBO (small bowel obstruction) (H) 08/10/2021    SVT (supraventricular tachycardia)     Thrombocytopenia     Uncomplicated asthma     Vitamin C deficiency 08/12/2018             Past Surgical History:      Past Surgical History:   Procedure Laterality Date    AMPUTATE LEG ABOVE KNEE N/A 4/1/2024    Procedure: AMPUTATION, ABOVE KNEE right leg with wound vac dressing, excision of anteriotibial bypass graft, closure  of (previous interventional radiology) left groin incision;  Surgeon: José Luis Hernandez MD;  Location: [facility]    AMPUTATE LEG ABOVE KNEE Right 4/9/2024    Procedure: COMPLETION RIGHT ABOVE KNEE AMPUTATION;  Surgeon: José Luis Hernandez MD;  Location: [facility] OR    ANGIOGRAM      ANGIOGRAM Right 6/23/2021    Procedure: RIGHT LOWER EXTREMITY ANGIOGRAM WITH LEFT BRACHIAL ARTERY CUTDOWN;  Surgeon: José Luis Hernandez MD;  Location: [facility] OR    APPLY WOUND VAC Right 5/16/2024    Procedure: PLACEMENT OF WOUND VAC TO RIGHT ABOVE KNEE AMPUTATION;  Surgeon: Tay Enciso MD;  Location: [facility] OR    APPLY WOUND VAC N/A 5/20/2024    Procedure: AND PLACEMENT OF WOUND VAC;  Surgeon: José Luis Hernandez MD;  Location: [facility] OR    BIOPSY MASS NECK Right 10/11/2023    Procedure: Right Parotid Biopsy;  Surgeon: Randal Mendoza MD;  Location:  OR    BRONCHOSCOPY FLEXIBLE AND RIGID N/A 6/26/2024    Procedure: Bronchoscopy flexible and rigid;  Surgeon: Rod Nath MD;  Location:  GI    BYPASS GRAFT FEMOROPOPLITEAL Right 09/23/2020    Procedure: RIGHT FEMORAL GRAFT TO ABOVE-KNEE POPLITEAL BYPASS USING CADAVERIC SUPERFICIAL FEMORAL ARTERY;  Surgeon: Chadwick Lozano MD;  Location:  OR    BYPASS GRAFT FEMOROPOPLITEAL Right 2/15/2022    Procedure: RIGHT SUPERFICIAL FEMORAL ARTERY GRAFT TO BELOW KNEE POPLITEAL BYPASS WITH CADAVERIC CRYOLIFE  FEMORAL-POPLITEAL ARTERY SIZE: OUTER DIAMETER: 7MM - 6MM;  Surgeon: Chadwick Lozano;  Location:  OR    BYPASS GRAFT FEMOROPOPLITEAL Right 5/26/2023    Procedure: RIGHT DISTAL SUPERFICIAL FEMORAL GRAFT TO ANTERIOR TIBIAL ARTERY WITH 26 CENTIMETER CADAVERIC SUPERFICIAL FEMORAL ARTERY GRAFT;  Surgeon: Chadwick Lozano MD;  Location: [facility]    BYPASS GRAFT FEMOROPOPLITEAL Right 10/11/2023    Procedure: RIGHT FEMORAL TO POPLITEAL GRAFT THROMBECTOMY;  Surgeon: Chadwick Lozano MD;  [facility]    BYPASS GRAFT INSITU FEMOROPOPLITEAL Right 7/7/2021    Procedure:  CREATION RIGHT FEMORAL TO POPLITEAL ARTERIAL BYPASS USING INSITU VEIN GRAFT;  Surgeon: José Luis Hernandez MD;  Location:  OR    CARDIAC CATHERIZATION  09/03/2009    multivessel CAD without target lesions, med mgmt indicated, preserved ef    CARPAL TUNNEL RELEASE RT/LT Right 05/20/2021    COLONOSCOPY N/A 12/12/2023    Procedure: Colonoscopy;  Surgeon: Corey Hoffman MD;  Location:  GI    COLONOSCOPY N/A 12/14/2023    Procedure: Colonoscopy;  Surgeon: Corey Hoffman MD;  Location:  GI    CV CORONARY ANGIOGRAM N/A 10/4/2023    Procedure: Coronary Angiogram;  Surgeon: Rogelio Ricks MD;  Location:  HEART CARDIAC CATH LAB    CV PCI N/A 10/4/2023    Procedure: Percutaneous Coronary Intervention;  Surgeon: Rogelio Ricks MD;  Location:  HEART CARDIAC CATH LAB    CV RIGHT HEART CATH MEASUREMENTS RECORDED N/A 11/27/2024    Procedure: Right Heart Cath;  Surgeon: Rogelio Ricks MD;  Location:  HEART CARDIAC CATH LAB    EMBOLECTOMY LOWER EXTREMITY Right 10/6/2023    Procedure: Right anterior tibial bypass with graft, Right tibial endarterectomy,thrombectomy, Right doraslis pedis thrombectomy, Anterior Tibial vein patch.;  Surgeon: Chadwick Lozano MD;  Location:  OR    ENDARTERECTOMY CAROTID Right 05/11/2016    Procedure: ENDARTERECTOMY CAROTID;  Surgeon: Chadwick Lozano MD;  Location:  OR    ENDARTERECTOMY CAROTID Left 06/08/2020    Procedure: LEFT CAROTID ENDARTERECTOMY with distal facal vein patch  and EEG;  Surgeon: Chadwick Lozano MD;  Location:  OR    ESOPHAGOSCOPY, GASTROSCOPY, DUODENOSCOPY (EGD), COMBINED N/A 12/12/2023    Procedure: Esophagoscopy, gastroscopy, duodenoscopy (EGD), combined;  Surgeon: Corey Hoffman MD;  Location:  GI    FINE NEEDLE ASPIRATION WITHOUT IMAGING GUIDANCE Right 9/22/2023    Procedure: Fine needle aspiration without imaging guidance;  Surgeon: Kiersten Aguilera MD;  Location:  GI     FLUORESCENCE INTRAOPERATIVE VASCULAR ANGIOGRAPHY Right 12/28/2022    Procedure: RIGHT LEG ANGIOGRAM with intervention;  Surgeon: Chadwick Lozano MD;  Location:  OR    GYN SURGERY  left tube    left salpingectomy    IR ANGIOGRAM THROUGH CATHETER (ARTERIAL)  10/6/2023    IR ANGIOGRAM THROUGH CATHETER (ARTERIAL)  10/6/2023    IR ANGIOGRAM THROUGH CATHETER (ARTERIAL)  3/31/2024    IR ANGIOGRAM THROUGH CATHETER (ARTERIAL)  3/30/2024    IR CHEST TUBE DRAIN TUNNELED RIGHT  12/12/2024    IR CHEST TUBE PLACEMENT NON-TUNNELLED LEFT  6/23/2024    IR CVC TUNNEL PLACEMENT > 5 YRS OF AGE  4/3/2024    IR CVC TUNNEL PLACEMENT > 5 YRS OF AGE  6/23/2024    IR CVC TUNNEL REMOVAL RIGHT  5/28/2024    IR CVC TUNNEL REMOVAL RIGHT  7/3/2024    IR CVC TUNNEL REVISION RIGHT  4/15/2024    IR LOWER EXTREMITY ANGIOGRAM RIGHT  05/25/2021    IR LOWER EXTREMITY ANGIOGRAM RIGHT  10/5/2023    IR LOWER EXTREMITY ANGIOGRAM RIGHT  3/29/2024    IR OR ANGIOGRAM  6/23/2021    IR OR ANGIOGRAM  12/28/2022    IRRIGATION AND DEBRIDEMENT LOWER EXTREMITY, COMBINED Right 5/16/2024    Procedure: IRRIGATION AND DEBRIDEMENT OF RIGHT ABOVE KNEE AMPUTATION;  Surgeon: Tay Enciso MD;  Location:  OR    IRRIGATION AND DEBRIDEMENT LOWER EXTREMITY, COMBINED Right 5/20/2024    Procedure: IRRIGATION AND DEBRIDMENT RIGHT LOWER EXTREMITY;  Surgeon: José Luis Hernandez MD;  Location:  OR    LAPAROSCOPIC CHOLECYSTECTOMY N/A 09/27/2017    Procedure: LAPAROSCOPIC CHOLECYSTECTOMY;  LAPAROSCOPIC CHOLECYSTECTOMY;  Surgeon: Jacoby Tapia MD;  Location:  [facility]    LAPAROSCOPY DIAGNOSTIC (GENERAL) N/A 8/11/2021    Procedure: Exploratory laparoscopy,  laparoscopic lysis of adhesions, laparotomy;  Surgeon: Corina Ferreira MD;  Location:  OR    OR ANGIOGRAM, LOWER EXTREMITY Right 10/11/2023    Procedure: Or Angiogram, Lower Extremity;  Surgeon: Chadwick Lozano MD;  Location:  OR    ORTHOPEDIC SURGERY      left knee surgery    REPAIR ANEURYSM  FEMORAL ARTERY Left 12/28/2022    Procedure: REPAIR LEFT FEMORAL PSEUDOANEURYSM;  Surgeon: Chadwick Lozano MD;  Location: [facility] OR    VASCULAR SURGERY  aoto bi fem  left fem-AK pop    [facility] FABRIC WRAPPING OF ABDOMINAL ANEURYSM      [facility] NONSPECIFIC PROCEDURE  12/2000    angioplasty with stent right fem. a.    [facility] NONSPECIFIC PROCEDURE  1987    sinus surgery    [facility] NONSPECIFIC PROCEDURE  09/02/2009    Emergent left groin exploration with oversewing of bleeding angiographic site. 2. Endarterectomy of common femoral-proximal superficial femoral artery with greater saphenous vein patch angioplasty.   a. [place] of accessory right greater saphenous vein.     [facility] NONSPECIFIC PROCEDURE  08/27/2009    occluded left common iliac and external iliac arteries were successfully revascularized with stenting to 8 and 7 mm             Social History:     Social History     Tobacco Use    Smoking status: Former     Current packs/day: 0.25     Average packs/day: 0.3 packs/day for 57.2 years (14.3 ttl pk-yrs)     Types: Cigarettes     Start date: 1968    Smokeless tobacco: Never    Tobacco comments:     1/2 PPD. 1-3 cigs a day     3/4/25 Pt states she will take drags of [place]ek [place].    Substance Use Topics    Alcohol use: Not Currently     Comment: x1-2 yr            Family History:     Family History   Problem Relation Age of Onset    Cancer Mother         bladder    Cardiovascular Father         alive,multiple heart attacks    Diabetes Maternal Grandmother     Lung Cancer Maternal Grandmother     Blood Disease Brother         clotting disorder             Allergies:      Allergies   Allergen Reactions    Contrast Dye Anaphylaxis     Pt reported facial and throat swelling with prior CT contrast    Gluten Meal Diarrhea and GI Disturbance     Hx of celiac     Pantoprazole Swelling    Penicillin G Itching and Rash    Penicillins Itching and Rash    Varenicline      Nightmares            Medications:     Current Outpatient Medications    Medication Sig Dispense Refill    acetaminophen (TYLENOL) 500 MG tablet Take 1-2 tablets (500-1,000 mg) by mouth every 6 hours as needed for mild pain 60 tablet 0    albuterol (PROAIR HFA/PROVENTIL HFA/VENTOLIN HFA) 108 (90 Base) MCG/ACT inhaler Inhale 2 puffs into the lungs every 4 hours as needed for shortness of breath, wheezing or cough. 18 g 0    amLODIPine (NORVASC) 10 MG tablet TAKE ONE TABLET BY MOUTH ONE TIME DAILY 90 tablet 3    apixaban ANTICOAGULANT (ELIQUIS) 2.5 MG tablet Take 1 tablet (2.5 mg) by mouth 2 times daily. 180 tablet 3    augmented betamethasone dipropionate (DIPROLENE AF) 0.05 % external cream Betamethasone Augmented Topical Cream 0.05 %        active      budesonide-formoterol (SYMBICORT) 160-4.5 MCG/ACT Inhaler Inhale 2 puffs into the lungs two times daily Disp 3 inhalers 30.6 g 3    Calcium-Vitamin D-Vitamin K (CALCIUM + D PO) Calcium-Cholecalciferol Oral 600 mg-10 mcg (400 unit)        active      carvedilol (COREG) 12.5 MG tablet Take 1 tablet (12.5 mg) by mouth 2 times daily (with meals). 180 tablet 3    cephALEXin (KEFLEX) 500 MG capsule Cephalexin Oral     take one twice daily   active      clopidogrel (PLAVIX) 75 MG tablet Take 1 tablet (75 mg) by mouth daily. 90 tablet 3    denosumab (PROLIA) 60 mg/mL injection Denosumab Subcutaneous (60 mg/mL - Prolia)        active      diphenhydrAMINE HCl (BENADRYL ALLERGY PO) Diphenhydramine Oral        active      FERROUS SULFATE CR PO Ferrous Sulfate Oral     take every other day   active      fluticasone (ARNUITY ELLIPTA) 200 MCG/ACT inhaler Fluticasone-Vilanterol Inhaler 200 mcg-25 mcg/dose        active      gabapentin (NEURONTIN) 100 MG capsule TAKE TWO CAPSULES BY MOUTH DAILY AT BEDTIME 180 capsule 1    hydrALAZINE (APRESOLINE) 25 MG tablet Take 1 tablet (25 mg) by mouth 3 times daily. 90 tablet 11    ipratropium - albuterol 0.5 mg/2.5 mg/3 mL (DUONEB) 0.5-2.5 (3) MG/3ML neb solution Take 1 vial (3 mLs) by nebulization every 6 hours  as needed for shortness of breath, wheezing or cough. 360 mL 0    isosorbide mononitrate CR (IMDUR) 120 MG 24 HR ER tablet Take 1 tablet (120 mg) by mouth daily. 90 tablet 3    lisinopril (ZESTRIL) 5 MG tablet Lisinopril Oral     daily   active      multivitamin RENAL (TRIPHROCAPS) 1 capsule capsule Take 1 tablet by mouth.      nitroGLYcerin (NITROSTAT) 0.4 MG sublingual tablet Place 1 tablet (0.4 mg) under the tongue See Admin Instructions for chest pain 30 tablet 11    OMEPRAZOLE PO Omeprazole Oral Delayed Release Capsule     daily   active      rivaroxaban ANTICOAGULANT (XARELTO) 15 MG TABS tablet Rivaroxaban Oral     twice daily   active      rosuvastatin (CRESTOR) 10 MG tablet Take 1 tablet (10 mg) by mouth at bedtime 30 tablet 6    senna (SENOKOT) 8.6 MG tablet Sennosides Oral     once daily    active      torsemide (DEMADEX) 20 MG tablet Take 1 tablet (20 mg) by mouth 2 times daily. 60 tablet 3    triamcinolone (KENALOG) 0.5 % external ointment Triamcinolone Topical Cream 0.1 %        active       No current facility-administered medications for this visit.            Review of Systems:     12-point ROS reviewed and abnormalities stated in the history.         Physical Exam:     Constitutional - looks well, in no apparent distress  Eyes - no redness or discharge  Respiratory -breathing appears comfortable.  No cough.  Skin - No appreciable discoloration or lesions (very limited exam)  Neurological - No apparent tremors. Speech fluent and articlate  Psychiatric - no signs of delirium or anxiety     Exam limited to that easily identified on a virtual visit. The rest of a comprehensive physical examination is deferred due to [facility] (public health emergency) video visit restrictions.         Current Laboratory Data:   All laboratory and imaging data reviewed.           No data to display

## 2025-03-04 NOTE — NURSING NOTE
Current patient location: [address]  [facility] [address]    Is the patient currently in the state of [place]? YES    Visit mode: VIDEO    If the visit is dropped, the patient can be reconnected by:VIDEO VISIT: Text to cell phone:   Telephone Information:   Mobile [phone]       Will anyone else be joining the visit? NO  (If patient encounters technical issues they should call [phone]    Are changes needed to the allergy or medication list?  Yes  Chantix-nightmares    Are refills needed on medications prescribed by this physician? NO    Rooming Documentation:  Questionnaire(s) not done per department protocol    Oxygen level: 94    Reason for visit: Consult (New Oncology )    Francesca MEIER

## 2025-03-04 NOTE — LETTER
"3/4/2025       RE: Shirley Hendricks  [id number]  [facility] [address]     Dear Colleague,    Thank you for referring your patient, Shirley Hendricks, to the [facility] at [facility]. Please see a copy of my visit note below.    Virtual Visit Details    See note        LUNG NODULE & INTERVENTIONAL PULMONARY CLINIC  CLINICS & SURGERY CENTER, [facility], [facility] CANCER [address] [address]  Phone: [phone]  Fax: [phone]    Patient:  Shirley Hendricks, Age 64 year old, Date of birth 1960, MRN# [id number]  Date of Visit:  03/04/2025  Referring Provider Referred Self    Reason for Consultation: Pleural Effusion     The patient has been notified of following:   \"This video visit will be conducted via a call between you and your physician/provider. We have found that certain health care needs can be provided without the need for an in-person physical exam.  This service lets us provide the care you need with a video conversation.  If a prescription is necessary we can send it directly to your pharmacy.  If lab work is needed we can place an order for that and you can then stop by our lab to have the test done at a later time.  Video visits are billed at different rates depending on your insurance coverage.  Please reach out to your insurance provider with any questions.  If during the course of the call the physician/provider feels a video visit is not appropriate, you will not be charged for this service.\"  Patient has given verbal consent for Video visit? Yes  How would you like to obtain your AVS? Please refer to rooming staff note  Patient would like the video invitation sent by: Please refer to rooming staff note   Will anyone else be joining your video visit? Please refer to rooming staff note    Video-Visit " Details     Type of service:  Video Visit  Video Start Time: 140  Video End Time: 202  Provider Name: Rene Mitchell MD, MHA   Originating Location (pt. Location): Home  Provider Location: Off campus   Distant Location (provider location): Home/Clinic  Platform used for Video Visit: [facility]/Karyna    Assessment and Plan:    1. Bilateral pleural effusions presumed 2/2 CHF and CKD s/p right pleurx placement. Pleurx placed by IR and she is frustrated that she has had no directions since then. She is draining based on sx, which is about 9days and drains 890cc. I discussed with her that she needs to discuss fluid management with her cardiology and nephrologist in order to be at a point to consider removing the tube. She is frustrated at the deductible cost of the bottles and was not told that this would be a chronic situation. I told her to [place] back with her cardiologist and nephrologist to discuss. Plan to see back prn if fluid drainage is minimal for 2 consecutive days.     Billing: I spent a total of 45min spent on date of encounter which includes prep time, visit with the patient and post visit work including documentation and nursing communication      Complexity Add-on Statement: The longitudinal plan of care for the diagnosis(es)/condition(s) as documented were addressed during this visit. Due to the added complexity in care, I will continue to support Shirley in the subsequent management and with ongoing continuity of care.    Rene Mitchell MD, MHA  Associate Professor of Medicine  Section of Interventional Pulmonology   Division of Pulmonary, Allergy, Critical Care and Sleep Medicine   [facility]  Pager: [phone]   Office: [phone]  Email: [email or web address]    Eleni Severino RN   Interventional Pulmonary Care Coordinator   Office: [phone]  Email: [email or web address]     Salo Rodrigues  Interventional Pulmonary Surgery Scheduler   Office:  [phone]  Email: [email or web address]         History:     Shirley Hendricks is a 64 year old female with sig h/o for CAD, CHF, COPD, PAD, CKD who is here for evaluation/followup of Pleural Effusion.    - No new resp sx or complaints. Denies dyspnea or cough.   - Had right pleurx placed by IR on 12/12/24. Drains only when symptomatic ~once/9 days. Last drainage amount was 890cc. Most recent TTE showed EF of 55-60%. Last SCr was 1.36.   - Personal hx of cancer: no  - Family hx of cancer: no  - Exposure hx: Denies asbestos or radon exposure   - Tobacco hx: Current Smoker, 0.25ppd, >30pkyr.   - My interpretation of the images relevant for this visit includes: bilateral pleural effusions   - My interpretation of the PFT's relevant for this visit includes: None     CXR with bilateral pleural effusions L>R. Right pleurx is in place    Other active medical problems include:   - h/o CHF, CAD. Follows with cardiology   - h/o CKD. 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 Simons dermatology     Embolism and thrombosis of unspecified artery (H) 08/2000    Protein C,S, Leiden FV, Lupus Inhibitor Negative     Gastroesophageal reflux disease      Hyperlipidaemia      Hypertension      Lupus     skin     Mild major depression 11/07/2017     Myocardial infarction (H)     x3     Osteoarthrosis, unspecified whether generalized or localized, unspecified site      PAD (peripheral artery disease)      Peripheral vascular disease, unspecified 12/2000    s/p angioplasty with stent right femoral a.; Right iliac and femoral a. clot     Post-menopausal      Reflux esophagitis 02/2004    EGD: esophagitis and medium HH     SBO (small bowel  obstruction) (H) 08/10/2021     SVT (supraventricular tachycardia)      Thrombocytopenia      Uncomplicated asthma      Vitamin C deficiency 08/12/2018             Past Surgical History:      Past Surgical History:   Procedure Laterality Date     AMPUTATE LEG ABOVE KNEE N/A 4/1/2024    Procedure: AMPUTATION, ABOVE KNEE right leg with wound vac dressing, excision of anteriotibial bypass graft, closure of (previous interventional radiology) left groin incision;  Surgeon: José Luis Hernandez MD;  Location:  OR     AMPUTATE LEG ABOVE KNEE Right 4/9/2024    Procedure: COMPLETION RIGHT ABOVE KNEE AMPUTATION;  Surgeon: José Luis Hernandez MD;  Location:  OR     ANGIOGRAM       ANGIOGRAM Right 6/23/2021    Procedure: RIGHT LOWER EXTREMITY ANGIOGRAM WITH LEFT BRACHIAL ARTERY CUTDOWN;  Surgeon: José Luis Hernandez MD;  Location:  OR     APPLY WOUND VAC Right 5/16/2024    Procedure: PLACEMENT OF WOUND VAC TO RIGHT ABOVE KNEE AMPUTATION;  Surgeon: Tay Enciso MD;  Location:  OR     APPLY WOUND VAC N/A 5/20/2024    Procedure: AND PLACEMENT OF WOUND VAC;  Surgeon: José Luis Hernandez MD;  Location:  OR     BIOPSY MASS NECK Right 10/11/2023    Procedure: Right Parotid Biopsy;  Surgeon: Randal Mendoza MD;  Location:  OR     BRONCHOSCOPY FLEXIBLE AND RIGID N/A 6/26/2024    Procedure: Bronchoscopy flexible and rigid;  Surgeon: Rod Nath MD;  Location:  GI     BYPASS GRAFT FEMOROPOPLITEAL Right 09/23/2020    Procedure: RIGHT FEMORAL GRAFT TO ABOVE-KNEE POPLITEAL BYPASS USING CADAVERIC SUPERFICIAL FEMORAL ARTERY;  Surgeon: Chadwick Lozano MD;  Location:  OR     BYPASS GRAFT FEMOROPOPLITEAL Right 2/15/2022    Procedure: RIGHT SUPERFICIAL FEMORAL ARTERY GRAFT TO BELOW KNEE POPLITEAL BYPASS WITH CADAVERIC CRYOLIFE  FEMORAL-POPLITEAL ARTERY SIZE: OUTER DIAMETER: 7MM - 6MM;  Surgeon: Chadwick Lozano;  Location:  OR     BYPASS GRAFT FEMOROPOPLITEAL Right 5/26/2023    Procedure:  RIGHT DISTAL SUPERFICIAL FEMORAL GRAFT TO ANTERIOR TIBIAL ARTERY WITH 26 CENTIMETER CADAVERIC SUPERFICIAL FEMORAL ARTERY GRAFT;  Surgeon: Chadwick Lozano MD;  Location:  OR     BYPASS GRAFT FEMOROPOPLITEAL Right 10/11/2023    Procedure: RIGHT FEMORAL TO POPLITEAL GRAFT THROMBECTOMY;  Surgeon: Chadwick Lozano MD;  Location: [facility] OR     BYPASS GRAFT INSITU FEMOROPOPLITEAL Right 7/7/2021    Procedure: CREATION RIGHT FEMORAL TO POPLITEAL ARTERIAL BYPASS USING INSITU VEIN GRAFT;  Surgeon: José Luis Hernandez MD;  Location:  OR     CARDIAC CATHERIZATION  09/03/2009    multivessel CAD without target lesions, med mgmt indicated, preserved ef     CARPAL TUNNEL RELEASE RT/LT Right 05/20/2021     COLONOSCOPY N/A 12/12/2023    Procedure: Colonoscopy;  Surgeon: Corey Hoffman MD;  Location:  GI     COLONOSCOPY N/A 12/14/2023    Procedure: Colonoscopy;  Surgeon: Corey Hoffman MD;  Location:  GI     CV CORONARY ANGIOGRAM N/A 10/4/2023    Procedure: Coronary Angiogram;  Surgeon: Rogelio Ricks MD;  Location:  HEART CARDIAC CATH LAB     CV PCI N/A 10/4/2023    Procedure: Percutaneous Coronary Intervention;  Surgeon: Rogelio Ricks MD;  Location:  HEART CARDIAC CATH LAB     CV RIGHT HEART CATH MEASUREMENTS RECORDED N/A 11/27/2024    Procedure: Right Heart Cath;  Surgeon: Rogelio Ricks MD;  Location:  HEART CARDIAC CATH LAB     EMBOLECTOMY LOWER EXTREMITY Right 10/6/2023    Procedure: Right anterior tibial bypass with graft, Right tibial endarterectomy,thrombectomy, Right doraslis pedis thrombectomy, Anterior Tibial vein patch.;  Surgeon: Chadwick Lozano MD;  Location:  OR     ENDARTERECTOMY CAROTID Right 05/11/2016    Procedure: ENDARTERECTOMY CAROTID;  Surgeon: Chadwick Lozano MD;  Location:  OR     ENDARTERECTOMY CAROTID Left 06/08/2020    Procedure: LEFT CAROTID ENDARTERECTOMY with distal facal vein patch  and EEG;  Surgeon: Blake  Chadwick Osman MD;  Location:  OR     ESOPHAGOSCOPY, GASTROSCOPY, DUODENOSCOPY (EGD), COMBINED N/A 12/12/2023    Procedure: Esophagoscopy, gastroscopy, duodenoscopy (EGD), combined;  Surgeon: Corey Hoffman MD;  Location:  GI     FINE NEEDLE ASPIRATION WITHOUT IMAGING GUIDANCE Right 9/22/2023    Procedure: Fine needle aspiration without imaging guidance;  Surgeon: Kiersten Aguilera MD;  Location:  GI     FLUORESCENCE INTRAOPERATIVE VASCULAR ANGIOGRAPHY Right 12/28/2022    Procedure: RIGHT LEG ANGIOGRAM with intervention;  Surgeon: Chadwick Lozano MD;  Location:  OR     GYN SURGERY  left tube    left salpingectomy     IR ANGIOGRAM THROUGH CATHETER (ARTERIAL)  10/6/2023     IR ANGIOGRAM THROUGH CATHETER (ARTERIAL)  10/6/2023     IR ANGIOGRAM THROUGH CATHETER (ARTERIAL)  3/31/2024     IR ANGIOGRAM THROUGH CATHETER (ARTERIAL)  3/30/2024     IR CHEST TUBE DRAIN TUNNELED RIGHT  12/12/2024     IR CHEST TUBE PLACEMENT NON-TUNNELLED LEFT  6/23/2024     IR CVC TUNNEL PLACEMENT > 5 YRS OF AGE  4/3/2024     IR CVC TUNNEL PLACEMENT > 5 YRS OF AGE  6/23/2024     IR CVC TUNNEL REMOVAL RIGHT  5/28/2024     IR CVC TUNNEL REMOVAL RIGHT  7/3/2024     IR CVC TUNNEL REVISION RIGHT  4/15/2024     IR LOWER EXTREMITY ANGIOGRAM RIGHT  05/25/2021     IR LOWER EXTREMITY ANGIOGRAM RIGHT  10/5/2023     IR LOWER EXTREMITY ANGIOGRAM RIGHT  3/29/2024     IR OR ANGIOGRAM  6/23/2021     IR OR ANGIOGRAM  12/28/2022     IRRIGATION AND DEBRIDEMENT LOWER EXTREMITY, COMBINED Right 5/16/2024    Procedure: IRRIGATION AND DEBRIDEMENT OF RIGHT ABOVE KNEE AMPUTATION;  Surgeon: Tay Enciso MD;  Location:  OR     IRRIGATION AND DEBRIDEMENT LOWER EXTREMITY, COMBINED Right 5/20/2024    Procedure: IRRIGATION AND DEBRIDMENT RIGHT LOWER EXTREMITY;  Surgeon: José Luis Hernandez MD;  Location:  OR     LAPAROSCOPIC CHOLECYSTECTOMY N/A 09/27/2017    Procedure: LAPAROSCOPIC CHOLECYSTECTOMY;  LAPAROSCOPIC CHOLECYSTECTOMY;   Surgeon: Jacoby Tapia MD;  Location: [facility]     LAPAROSCOPY DIAGNOSTIC (GENERAL) N/A 8/11/2021    Procedure: Exploratory laparoscopy,  laparoscopic lysis of adhesions, laparotomy;  Surgeon: Corina Ferreira MD;  Location:  OR     OR ANGIOGRAM, LOWER EXTREMITY Right 10/11/2023    Procedure: Or Angiogram, Lower Extremity;  Surgeon: Chadwick Lozano MD;  Location:  OR     ORTHOPEDIC SURGERY      left knee surgery     REPAIR ANEURYSM FEMORAL ARTERY Left 12/28/2022    Procedure: REPAIR LEFT FEMORAL PSEUDOANEURYSM;  Surgeon: Chadwick Lozano MD;  Location:  OR     VASCULAR SURGERY  aoto bi fem  left fem-AK pop     [facility] FABRIC WRAPPING OF ABDOMINAL ANEURYSM       [facility] NONSPECIFIC PROCEDURE  12/2000    angioplasty with stent right fem. a.     [facility] NONSPECIFIC PROCEDURE  1987    sinus surgery     [facility] NONSPECIFIC PROCEDURE  09/02/2009    Emergent left groin exploration with oversewing of bleeding angiographic site. 2. Endarterectomy of common femoral-proximal superficial femoral artery with greater saphenous vein patch angioplasty.   a. [place] of accessory right greater saphenous vein.      [facility] NONSPECIFIC PROCEDURE  08/27/2009    occluded left common iliac and external iliac arteries were successfully revascularized with stenting to 8 and 7 mm             Social History:     Social History     Tobacco Use     Smoking status: Former     Current packs/day: 0.25     Average packs/day: 0.3 packs/day for 57.2 years (14.3 ttl pk-yrs)     Types: Cigarettes     Start date: 1968     Smokeless tobacco: Never     Tobacco comments:     1/2 PPD. 1-3 cigs a day     3/4/25 Pt states she will take drags of [employer] Bar.    Substance Use Topics     Alcohol use: Not Currently     Comment: x1-2 yr            Family History:     Family History   Problem Relation Age of Onset     Cancer Mother         bladder     Cardiovascular Father         alive,multiple heart attacks     Diabetes Maternal Grandmother      Lung Cancer  Maternal Grandmother      Blood Disease Brother         clotting disorder             Allergies:      Allergies   Allergen Reactions     Contrast Dye Anaphylaxis     Pt reported facial and throat swelling with prior CT contrast     Gluten Meal Diarrhea and GI Disturbance     Hx of celiac      Pantoprazole Swelling     Penicillin G Itching and Rash     Penicillins Itching and Rash     Varenicline      Nightmares            Medications:     Current Outpatient Medications   Medication Sig Dispense Refill     acetaminophen (TYLENOL) 500 MG tablet Take 1-2 tablets (500-1,000 mg) by mouth every 6 hours as needed for mild pain 60 tablet 0     albuterol (PROAIR HFA/PROVENTIL HFA/VENTOLIN HFA) 108 (90 Base) MCG/ACT inhaler Inhale 2 puffs into the lungs every 4 hours as needed for shortness of breath, wheezing or cough. 18 g 0     amLODIPine (NORVASC) 10 MG tablet TAKE ONE TABLET BY MOUTH ONE TIME DAILY 90 tablet 3     apixaban ANTICOAGULANT (ELIQUIS) 2.5 MG tablet Take 1 tablet (2.5 mg) by mouth 2 times daily. 180 tablet 3     augmented betamethasone dipropionate (DIPROLENE AF) 0.05 % external cream Betamethasone Augmented Topical Cream 0.05 %        active       budesonide-formoterol (SYMBICORT) 160-4.5 MCG/ACT Inhaler Inhale 2 puffs into the lungs two times daily Disp 3 inhalers 30.6 g 3     Calcium-Vitamin D-Vitamin K (CALCIUM + D PO) Calcium-Cholecalciferol Oral 600 mg-10 mcg (400 unit)        active       carvedilol (COREG) 12.5 MG tablet Take 1 tablet (12.5 mg) by mouth 2 times daily (with meals). 180 tablet 3     cephALEXin (KEFLEX) 500 MG capsule Cephalexin Oral     take one twice daily   active       clopidogrel (PLAVIX) 75 MG tablet Take 1 tablet (75 mg) by mouth daily. 90 tablet 3     denosumab (PROLIA) 60 mg/mL injection Denosumab Subcutaneous (60 mg/mL - Prolia)        active       diphenhydrAMINE HCl (BENADRYL ALLERGY PO) Diphenhydramine Oral        active       FERROUS SULFATE CR PO Ferrous Sulfate Oral      take every other day   active       fluticasone (ARNUITY ELLIPTA) 200 MCG/ACT inhaler Fluticasone-Vilanterol Inhaler 200 mcg-25 mcg/dose        active       gabapentin (NEURONTIN) 100 MG capsule TAKE TWO CAPSULES BY MOUTH DAILY AT BEDTIME 180 capsule 1     hydrALAZINE (APRESOLINE) 25 MG tablet Take 1 tablet (25 mg) by mouth 3 times daily. 90 tablet 11     ipratropium - albuterol 0.5 mg/2.5 mg/3 mL (DUONEB) 0.5-2.5 (3) MG/3ML neb solution Take 1 vial (3 mLs) by nebulization every 6 hours as needed for shortness of breath, wheezing or cough. 360 mL 0     isosorbide mononitrate CR (IMDUR) 120 MG 24 HR ER tablet Take 1 tablet (120 mg) by mouth daily. 90 tablet 3     lisinopril (ZESTRIL) 5 MG tablet Lisinopril Oral     daily   active       multivitamin RENAL (TRIPHROCAPS) 1 capsule capsule Take 1 tablet by mouth.       nitroGLYcerin (NITROSTAT) 0.4 MG sublingual tablet Place 1 tablet (0.4 mg) under the tongue See Admin Instructions for chest pain 30 tablet 11     OMEPRAZOLE PO Omeprazole Oral Delayed Release Capsule     daily   active       rivaroxaban ANTICOAGULANT (XARELTO) 15 MG TABS tablet Rivaroxaban Oral     twice daily   active       rosuvastatin (CRESTOR) 10 MG tablet Take 1 tablet (10 mg) by mouth at bedtime 30 tablet 6     senna (SENOKOT) 8.6 MG tablet Sennosides Oral     once daily    active       torsemide (DEMADEX) 20 MG tablet Take 1 tablet (20 mg) by mouth 2 times daily. 60 tablet 3     triamcinolone (KENALOG) 0.5 % external ointment Triamcinolone Topical Cream 0.1 %        active       No current facility-administered medications for this visit.            Review of Systems:     12-point ROS reviewed and abnormalities stated in the history.         Physical Exam:     Constitutional - looks well, in no apparent distress  Eyes - no redness or discharge  Respiratory -breathing appears comfortable.  No cough.  Skin - No appreciable discoloration or lesions (very limited exam)  Neurological - No apparent  tremors. Speech fluent and articlate  Psychiatric - no signs of delirium or anxiety     Exam limited to that easily identified on a virtual visit. The rest of a comprehensive physical examination is deferred due to PHE (public health emergency) video visit restrictions.         Current Laboratory Data:   All laboratory and imaging data reviewed.           No data to display                                Again, thank you for allowing me to participate in the care of your patient.      Sincerely,    Rene Mitchell MD

## 2025-03-06 ENCOUNTER — TELEPHONE (OUTPATIENT)
Dept: INTERNAL MEDICINE | Facility: CLINIC | Age: 65
End: 2025-03-06
Payer: COMMERCIAL

## 2025-03-06 NOTE — TELEPHONE ENCOUNTER
Home Care is calling regarding an established patient with [facility].  Requesting orders from: Vasquez Benoit  FYI: RN able to provide verbal orders.  Sending as FYI only.  Is this a request for a temporary pause in the home care episode?  No    Orders Requested    Skilled Nursing  Request for continuation of care with no increase or decrease in frequency  Frequency: once a week for 8 weeks   RN gave verbal order: Yes        Caller: Naty ANN  Home Care Agency: [facility]  Phone number Home Care can be reached at: [phone]  Okay to leave a detailed message?: Yes    Hailey GONSALEZ Do, RN

## 2025-03-06 NOTE — TELEPHONE ENCOUNTER
Home Care is calling regarding an established patient with [facility].  Requesting orders from: Vasquez Benoit RN APPROVED: RN able to provide verbal orders.  Home Care will send orders for signature.  RN will close encounter.  Is this a request for a temporary pause in the home care episode?  No    Orders Requested    Physical Therapy  Request for continuation of care with no increase or decrease in frequency  Frequency: Frequency: 1x/week for 1 week, 1x/week every other week for 6 weeks, then 1x/week for 1 week  RN gave verbal order: Yes        Caller: Tanya  Home Care Agency: [facility]  Phone number Home Care can be reached at: [phone]  Okay to leave a detailed message?: Yes    Nehal Rodgers, RN

## 2025-03-07 ENCOUNTER — VIRTUAL VISIT (OUTPATIENT)
Dept: INTERNAL MEDICINE | Facility: CLINIC | Age: 65
End: 2025-03-07
Payer: COMMERCIAL

## 2025-03-07 DIAGNOSIS — M25.511 BILATERAL SHOULDER PAIN, UNSPECIFIED CHRONICITY: ICD-10-CM

## 2025-03-07 DIAGNOSIS — M25.512 BILATERAL SHOULDER PAIN, UNSPECIFIED CHRONICITY: ICD-10-CM

## 2025-03-07 DIAGNOSIS — J90 PLEURAL EFFUSION: ICD-10-CM

## 2025-03-07 DIAGNOSIS — Z89.611 HX OF AKA (ABOVE KNEE AMPUTATION), RIGHT (H): ICD-10-CM

## 2025-03-07 DIAGNOSIS — N18.32 STAGE 3B CHRONIC KIDNEY DISEASE (H): ICD-10-CM

## 2025-03-07 DIAGNOSIS — Z74.09 MOBILITY IMPAIRED: ICD-10-CM

## 2025-03-07 PROCEDURE — 98006 SYNCH AUDIO-VIDEO EST MOD 30: CPT | Performed by: INTERNAL MEDICINE

## 2025-03-07 NOTE — PROGRESS NOTES
Shirley is a 64 year old who is being evaluated via a billable video visit.    How would you like to obtain your AVS? MyChart  If the video visit is dropped, the invitation should be resent by: Text to cell phone: [phone]  Will anyone else be joining your video visit? No       ASSESSMENT:    1. Mobility impaired   Has right AKA and unable to manage MRADLs with a walker due to bilateral shoulder pain, fatigue and weakness with prolonged use.  Without a wheelchair, patient would be bed bound.  Currently has standard 1K wheelchair that does not meet her needs due to heavy weight, difficulty with rolling the wheelchair, absence of supportive back support and discomfort/pain in her shoulders with arm propelling because of the wheelchair wheel positioning.  Patient's home provides adequate access between rooms, maneuvering space and hard floor surfaces.  Following Occupational Therapy evaluation, it appears patient's mobility would be significantly improved with an upgraded K5 wheelchair and improve her ability to participate in MRADLs and patient states she would regularly use it in her home in addition to using it to attend medical appointments.  Patient would also benefit from positioning backrest with lateral support for the wheelchair to assist in sitting upright, provide comfort and assist in reducing back fatigue and improve posture for use of self-propelling.  She would also benefit from a positioning seat cushion that she requires greater support to her pelvis to provide a good support days for sitting and reducing pressure on the buttock areas.    2. Hx of AKA (above knee amputation), right (H)  Patient not using prosthesis at this time.  Allowing for some distal skin irritation.  Continue management per home care nursing.  Denies current signs of actual infection    3. Bilateral shoulder pain, unspecified chronicity  Secondary to use of standard wheelchair.  Working with physical therapy.  Declines need for  Ortho evaluation at this time    4. Pleural effusion  Improved with Pleurx catheter.  Managed by pulmonary.  Recent note in chart requesting improved diuresis and nephrology recently increased patient's torsemide to 60 mg daily.  Has future labs ordered next week for monitoring.  CBC and chest x-ray in chart below.  O2 sats 96% on room air per patient.  No orthopnea reported    5. Stage 3b chronic kidney disease (H)  Most recent GFR 44, improving.  Has labs next week for follow-up given increased torsemide dosage recently          PLAN:   OT form signed for pt to receive a new K5 [employer] WC.  Will await further paperwork/paper prescription to sign and fax from [employer] and mobility company  Continue current medications  Nonfasting lab next week as scheduled  Continue Pleurx catheter management per pulmonary    Continue physical therapy for shoulder issues.  If symptoms not improving, patient will contact clinic and will refer to Ortho      Video-Visit Details    Type of service:  Video Visit    Video Start Time: 3:45 PM    Video End Time: 4:01 PM    Originating Location (pt. Location): Home    Distant Location (provider location):  [facility]     Platform used for Video Visit: Debby Watson is a 64 year old, presenting for the following health issues:  Orders (New wheel chair)         History of Present Illness       Reason for visit:  New wheel chair   She is taking medications regularly.        Most recent lab results reviewed with pt.      HPI:   Hx Right AKA.  Patient states she has not been using a prosthesis on the right leg at this time because of some healing of mild skin irritation at stump site.  Denies any drainage from the site, redness, fevers, chills.  Therefore using a standard wheelchair for mobility through her home to perform MRADLs and get between bedroom, bathroom, kitchen, living room.  States that she has to reach quite posterior to  reach the wheels of the wheelchair which is causing increasing bilateral shoulder pain.  This is   contributing to some difficulty using her walker when transferring for example when going into the bathroom because of the shoulder pain issues.  Also difficulty use the walker for longer distances due to weakness weakness.  Working with home physical therapy.    Patient also requires use of wheelchair to access medical appointments.  Current wheelchair is a standard [facility].  Because of this, patient was evaluated by Occupational Therapy to get a new wheelchair.  Breathing is improved.  Patient states O2 sats 96% on room air currently and not needing oxygen at night.  Continues to use PLeurx catheter right chest wall draining about 900 ccs every 9 days.  Nephrology recently increased dosage to torsemide 60 mg daily and patient has lab follow-up scheduled for next week.  Denies chest pain, abdominal pain.     Additional ROS:   Constitutional, HEENT, Cardiovascular, Pulmonary, GI and , Neuro, MSK and Psych review of systems/symptoms are otherwise negative or unchanged from previous, except as noted above.         EXAM: XR CHEST 2 VIEWS  LOCATION: [facility]  DATE: 3/3/2025     INDICATION:  Pleural effusion  COMPARISON: CT chest/abdomen/pelvis 01/06/2025                                                                    IMPRESSION: Stable position of Pleurx catheter at the right base. Stable small right and small to moderate left pleural effusions with associated compressive atelectasis. Stable upper limits normal heart size. No definite pulmonary vascular congestion.   Dense atherosclerotic calcifications of the aortic arch. No pneumothorax. Remote appearing deformity of the proximal humeral shaft on the right. Surgical clips overlie the left upper extremity. No definite acute osseous abnormality.           Objective :  Patient reports O2 sats 96% on room air    Physical Exam:  GENERAL:  alert and no  distress  EYES: Eyes grossly normal to inspection, conjunctivae and sclerae normal  RESP: no audible wheeze, cough, or visible cyanosis.  No visible retractions or increased work of breathing.  Able to speak fully in complete sentences.  NEURO: Cranial nerves grossly intact, mentation intact and speech normal  PSYCH: mentation appears normal, affect normal/bright, judgement and insight intact, normal speech and appearance well-groomed              Vasquez Benoit MD  Internal Medicine Department  [facility]  Internal Medicine Department      (Chart documentation was completed, in part, with [employer] voice-recognition software. Even though reviewed, some grammatical, spelling, and word errors may remain.)        .[email or web address]

## 2025-03-08 PROBLEM — T87.40 AMPUTATION STUMP INFECTION (H): Status: RESOLVED | Noted: 2024-05-29 | Resolved: 2025-03-08

## 2025-03-08 PROBLEM — I50.9 ACUTE ON CHRONIC HEART FAILURE, UNSPECIFIED HEART FAILURE TYPE (H): Status: RESOLVED | Noted: 2024-11-20 | Resolved: 2025-03-08

## 2025-03-08 PROBLEM — J44.9 CHRONIC OBSTRUCTIVE PULMONARY DISEASE, UNSPECIFIED COPD TYPE (H): Status: RESOLVED | Noted: 2017-07-20 | Resolved: 2025-03-08

## 2025-03-08 NOTE — PATIENT INSTRUCTIONS
OT form signed for pt to receive a new  Catalyst WC.  Will await further paperwork/paper prescription to sign and fax from [employer] and mobility company  Continue current medications  Nonfasting lab next week as scheduled  Continue Pleurx catheter management per pulmonary    Continue physical therapy for shoulder issues.  If symptoms not improving, patient will contact clinic and will refer to Ortho

## 2025-03-10 ENCOUNTER — LAB (OUTPATIENT)
Dept: LAB | Facility: CLINIC | Age: 65
End: 2025-03-10
Payer: COMMERCIAL

## 2025-03-10 DIAGNOSIS — D64.9 ANEMIA, UNSPECIFIED TYPE: ICD-10-CM

## 2025-03-10 DIAGNOSIS — N18.32 STAGE 3B CHRONIC KIDNEY DISEASE (H): ICD-10-CM

## 2025-03-10 DIAGNOSIS — N25.81 SECONDARY HYPERPARATHYROIDISM OF RENAL ORIGIN: ICD-10-CM

## 2025-03-10 DIAGNOSIS — N18.32 CHRONIC KIDNEY DISEASE (CKD) STAGE G3B/A1, MODERATELY DECREASED GLOMERULAR FILTRATION RATE (GFR) BETWEEN 30-44 ML/MIN/1.73 SQUARE METER AND ALBUMINURIA CREATININE RATIO LESS THAN 30 MG/G (H): ICD-10-CM

## 2025-03-10 LAB
ERYTHROCYTE [DISTWIDTH] IN BLOOD BY AUTOMATED COUNT: 17.1 % (ref 10–15)
HCT VFR BLD AUTO: 40.3 % (ref 35–47)
HGB BLD-MCNC: 12.8 G/DL (ref 11.7–15.7)
MCH RBC QN AUTO: 25.9 PG (ref 26.5–33)
MCHC RBC AUTO-ENTMCNC: 31.8 G/DL (ref 31.5–36.5)
MCV RBC AUTO: 82 FL (ref 78–100)
PLATELET # BLD AUTO: 213 10E3/UL (ref 150–450)
PTH-INTACT SERPL-MCNC: 90 PG/ML (ref 15–65)
RBC # BLD AUTO: 4.94 10E6/UL (ref 3.8–5.2)
WBC # BLD AUTO: 5.7 10E3/UL (ref 4–11)

## 2025-03-10 PROCEDURE — 80069 RENAL FUNCTION PANEL: CPT

## 2025-03-10 PROCEDURE — 83550 IRON BINDING TEST: CPT

## 2025-03-10 PROCEDURE — 82306 VITAMIN D 25 HYDROXY: CPT

## 2025-03-10 PROCEDURE — 83970 ASSAY OF PARATHORMONE: CPT

## 2025-03-10 PROCEDURE — 36415 COLL VENOUS BLD VENIPUNCTURE: CPT

## 2025-03-10 PROCEDURE — 83540 ASSAY OF IRON: CPT

## 2025-03-10 PROCEDURE — 85027 COMPLETE CBC AUTOMATED: CPT

## 2025-03-10 PROCEDURE — 82728 ASSAY OF FERRITIN: CPT

## 2025-03-11 LAB
ALBUMIN SERPL BCG-MCNC: 3.4 G/DL (ref 3.5–5.2)
ANION GAP SERPL CALCULATED.3IONS-SCNC: 9 MMOL/L (ref 7–15)
BUN SERPL-MCNC: 35.2 MG/DL (ref 8–23)
CALCIUM SERPL-MCNC: 8.5 MG/DL (ref 8.8–10.4)
CHLORIDE SERPL-SCNC: 99 MMOL/L (ref 98–107)
CREAT SERPL-MCNC: 1.1 MG/DL (ref 0.51–0.95)
EGFRCR SERPLBLD CKD-EPI 2021: 55 ML/MIN/1.73M2
FERRITIN SERPL-MCNC: 336 NG/ML (ref 11–328)
GLUCOSE SERPL-MCNC: 73 MG/DL (ref 70–99)
HCO3 SERPL-SCNC: 28 MMOL/L (ref 22–29)
IRON BINDING CAPACITY (ROCHE): 271 UG/DL (ref 240–430)
IRON SATN MFR SERPL: 13 % (ref 15–46)
IRON SERPL-MCNC: 36 UG/DL (ref 37–145)
PHOSPHATE SERPL-MCNC: 4.1 MG/DL (ref 2.5–4.5)
POTASSIUM SERPL-SCNC: 4.5 MMOL/L (ref 3.4–5.3)
SODIUM SERPL-SCNC: 136 MMOL/L (ref 135–145)
VIT D+METAB SERPL-MCNC: 14 NG/ML (ref 20–50)

## 2025-03-13 ENCOUNTER — MEDICAL CORRESPONDENCE (OUTPATIENT)
Dept: HEALTH INFORMATION MANAGEMENT | Facility: CLINIC | Age: 65
End: 2025-03-13
Payer: COMMERCIAL

## 2025-03-14 ENCOUNTER — MEDICAL CORRESPONDENCE (OUTPATIENT)
Dept: HEALTH INFORMATION MANAGEMENT | Facility: CLINIC | Age: 65
End: 2025-03-14
Payer: COMMERCIAL

## 2025-03-18 ENCOUNTER — DOCUMENTATION ONLY (OUTPATIENT)
Dept: ANTICOAGULATION | Facility: CLINIC | Age: 65
End: 2025-03-18
Payer: COMMERCIAL

## 2025-03-18 ENCOUNTER — NURSE TRIAGE (OUTPATIENT)
Dept: ONCOLOGY | Facility: CLINIC | Age: 65
End: 2025-03-18
Payer: COMMERCIAL

## 2025-03-18 ENCOUNTER — TELEPHONE (OUTPATIENT)
Dept: PULMONOLOGY | Facility: CLINIC | Age: 65
End: 2025-03-18
Payer: COMMERCIAL

## 2025-03-18 NOTE — TELEPHONE ENCOUNTER
Flaca with [facility], states pt has had a pleurx in since 12/12/24 and they drain weekly. Noticed today stitch is out and wondering what they should do next? Site looks okay overall, slight redness at insertion site, no drainage present, currently secure in dressing.     Most recent visit: 3/4/25 with Dr. Rene Mitchell

## 2025-03-18 NOTE — PROGRESS NOTES
Anticoagulant Therapeutic Duplication    Duplicate orders identified: different medication of the same therapeutic class    The duplicate anticoagulant order(s) has been discontinued    Active anticoagulant: apixaban (Eliquis)    Plan made per [facility] anticoagulation protocol.    Steve Means RN  3/18/2025

## 2025-03-18 NOTE — TELEPHONE ENCOUNTER
Writer called and LVM for Flaca, patient's home health RN regarding stitch falling out at PleurX drain site.  Relayed that there is no need to patient to follow up or have stitch replaced at this time.      Jo Ann Witt, RN BSN  RN Care Coordinator  Interventional Pulmonology

## 2025-03-27 DIAGNOSIS — E78.5 HYPERLIPIDEMIA LDL GOAL <70: ICD-10-CM

## 2025-03-28 RX ORDER — ROSUVASTATIN CALCIUM 10 MG/1
10 TABLET, COATED ORAL AT BEDTIME
Qty: 90 TABLET | Refills: 0 | Status: SHIPPED | OUTPATIENT
Start: 2025-03-28 | End: 2025-07-10

## 2025-03-28 NOTE — TELEPHONE ENCOUNTER
Patient due for fasting labs for follow-up regarding cholesterol management.  Call patient and check if she is being followed by home care.  If so,  then she is to ask for home care nurse to draw.  If not, then have labs done through clinic and make lab appointment

## 2025-03-31 NOTE — TELEPHONE ENCOUNTER
Called and spoke with pt, she wants a nurse to speak with her be cause she has an appt April 10th with [facility] and they have to do blood work so she is wondering if she can just do it all there,

## 2025-04-04 NOTE — TELEPHONE ENCOUNTER
MA wrote that opt has  MD appt 4/10/25 and asking if labs can be drawn there but no listing pf appt in EPIC for that date so have no idea who pt is seeing next week and have no means to send lab orders either. Would request thatr pt have labs drawn at a [place] lab  facility using orders in EPIC. Assist with scheduling lab appt at [facility] or other [facility] [place]

## 2025-04-10 ENCOUNTER — TRANSFERRED RECORDS (OUTPATIENT)
Dept: HEALTH INFORMATION MANAGEMENT | Facility: CLINIC | Age: 65
End: 2025-04-10
Payer: COMMERCIAL

## 2025-04-11 NOTE — PROGRESS NOTES
Shirley Hendricks is an extremely pleasant 63 year old year old female patient here today for rash on trunk.   .   Patient states this has been present for a while.  Patient reports the following symptoms:  itching.  Patient reports the following previous treatments none.  These treatments did not work.  Patient reports the following modifying factors none.  Associated symptoms: none.  Patient has no other skin complaints today.  Remainder of the HPI, Meds, PMH, Allergies, FH, and SH was reviewed in chart.      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 clot    Post-menopausal     Reflux esophagitis 02/2004    EGD: esophagitis and medium HH    SBO (small bowel obstruction) (H) 08/10/2021    SVT (supraventricular tachycardia)     Thrombocytopenia (H24)     Uncomplicated asthma     Vitamin C deficiency 08/12/2018       Past Surgical History:   Procedure Laterality Date    ANGIOGRAM      ANGIOGRAM Right 6/23/2021    Procedure: RIGHT LOWER EXTREMITY ANGIOGRAM WITH LEFT BRACHIAL ARTERY CUTDOWN;  Surgeon: José Luis Hernandez MD;  Location: [facility]    BIOPSY MASS NECK Right 10/11/2023    Procedure: Right Parotid Biopsy;  Surgeon: Randal Mendoza  Assessment and Plan: 	  Patient is 43y Male with PMHx of sickle cell disease admitted for anemia concerning for sickle cell crisis s/p ureteroscopy w/ L kidney biopsy 3/19, post-operative course c/b acute hemorrhagic shock, PEA arrest with ROSC; currently with acute renal failure on HD and global anoxic brain injury and urothelial cancer; s/p tracheostomy and pending GOC discussions to decide on possible interventions and dispo. GI consulted for melena & downtrending hemoglobin and pending EGD with possible PEG placement.    NEURO:  #Global anoxic brain injury   AAOx0. On sedation with no further myoclonus. Guarded prognosis.   - C/w valium 30 mg TID & attempt to wean off propofol completely.  - Repeat CT B 3/24 showing diffuse sulcal effacement with increased intracranial pressure, and few patchy foci of cortical blurring   - 3/25 MRIB with diffuse global abnormal supratentorial cortical restricted diffusion consistent with global hypoxic injury     #Seizures  No further myoclonus of upper body after restarting propofol.   - Witness myoclonic jerking concerning for seizures iso anoxic brain injury  - s/p 2.5g keppra load & midazolam 2mg IV q8 PRN   - vEEG report: "Abundant myoclonic seizures in the setting of a severe diffuse/multifocal cerebral dysfunction which can be seen in the setting of sedative effect or due to an anoxic injury, with improvement after 20:50 suggesting a lowering of sedation"; will f/u with neuro recommendations. Repeat vEEG 3/24 showing severe cerebral dysfunction   -C/w levetiracetam 500 mg po BID & Diazepam 30 mg po TID. Attempt to wean off propofol completely.      CV:  #Shock - hemorrhagic shock s/p kidney biopsy s/p 5u pRBC, 3 plasma, 3 plt  last dose of lovenox AC on 3/18 at 5PM   Hgb 8 -> ~3.  Current Hg = 5-6  - Not on levophed, MAP goal>65  - Monitor cbc q24  - Midodrine 10mg TID & trend CBC daily for Hg goal   - Hg goal >6 per heme/onc  - GI consulted given downtrending Hgb, melanotic stool, and positive stool occult blood feces. Pending EGD    #Hx of VTE (R IJ thrombus, L brachial DVT)  - CTM, hold AC given hemorrhage  - Bullae present on arm with DVT  - Appreciate orthopedics hand surgery consult, not compartment syndrome     PULM:  #Acute hypoxic respiratory failure  #Pna- 2/2 ventilation requirement  #Tracheostomy   CXR with R lung field and left mid and lower lung field hazy opacities.  - continue with duonebs Q6H & s/p HTS  - 3/28 CXR showing increasing RUL consolidation   -4/4 bedside tracheostomy   -S/p Abx completion course as below and currently on meropenem 500 mg Q24H (04/02 - 04/07)  -C/w trach care & wean vent settings as tolerated    RENAL:  #Acute renal failure.   #Renal mass s/p biopsy -   #Acidosis  Oliguric & receiving Bumex IVP PRN per nephro recs  Acute renal failure s/p cardiac arrest most likely 2/2 ATN requiring intermittent HD with nephro following  - TOV 3/28, failed; replaced daniel on 4/1; Attempt TOV when possible  - C/w Doxazosin  - Trend Cr, UOP, lytes  - S/p bicarb drip completion  - C/w sodium bicarbonate 650 mg po TID   - 3/31 removed L IJV shiley; 4/2 placed R fem shiley that was removed on 04/08  - Per pathology result of renal biopsy, noninvasive urothelial malignancy   - HD sessions per nephro. Removed R. femoral line (04/08) & patient will need HD access pending GOC discussion & daily labs. IR consulted for TDC and pending GOCs    GI:  #Shock liver  Unchanged  LFTs. Jaundice with hyperbilirubinemia (direct).   - Trend liver enzymes  - RUQ US showing enlarged periportal and periaortic enlarged lymph nodes and enlarged left lateral liver lobe and CBD 9 mm.     #Occult blood in feces  Positive Occult blood with dropping Hgb daily below goal of 6 ( Hx of SS). Patient with melena during hospital stay requiring pRBC to meet goal and with inappropriate Hgb response.  S/p 7 U of pRBCs since the beginning of April given downtrending Hgb  Maintain active T & S  C/w PPI 40 mg IV BID for now.  GI consulted given downtrending Hgb, melanotic stool, and positive stool occult blood feces. Pending EGD    #Diet:  - NPO except meds   - GI unable to place PEG due to hepatomegaly.   - Per IR consult: Given no safe window for endoscopic placement, recommend surgical consultation.   - Surgery consulted  - EGD with possible PEG placement pending      HEMATOLOGIC/ONCOLGOGIC   #Sickle cell crisis--Resolved  #Acute blood loss anemia- 2/2 sickle cells and uremia vs critical illness vs GIB  Downtrending direct hyperbilirubinemia,  gradually & Increasing reticulocyte count.   - Heme following & recs appreciated  - D/c deferasirox due to current renal failure   - S/p 7 U of pRBC since beginning of April  - Occult blood positive. C/w PPI 40 mg IV BID for now.  - Transfuse as needed Hb >6 goal per Heme.  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 CXR showing increased RUL opacification.   - D/c  aztreonam 2000 mg IV qd ( started 3/22- 3/27)   - S/p vancomycin 1500 mg IV qd (4/2); 2nd dose 4/4, 3rd dose 4/5  - 4/2- CXR showing increasing RUL consolidation  - Completed 5d meropenem 500 mg IV qd (started 3/27-3/31); (4/2-4/7). Hold off Meropenem after 5 day course   - CTM resp status    MSK:  #infiltration  Infiltration of sodium bicarb into left arm, now with arm distension and bullae. Elevated uric acid with nodule suggestive of podagra however no signs of active gout flare.   -Appreciate orthopedic hand surgery, not compartment syndrome   - CTM    Ethics: Full Code   Pending: Discussion with family   Palliative Team: Consulted   MD;  Location:  OR    BYPASS GRAFT FEMOROPOPLITEAL Right 09/23/2020    Procedure: RIGHT FEMORAL GRAFT TO ABOVE-KNEE POPLITEAL BYPASS USING CADAVERIC SUPERFICIAL FEMORAL ARTERY;  Surgeon: Chadwick Lozano MD;  Location:  OR    BYPASS GRAFT FEMOROPOPLITEAL Right 2/15/2022    Procedure: RIGHT SUPERFICIAL FEMORAL ARTERY GRAFT TO BELOW KNEE POPLITEAL BYPASS WITH CADAVERIC CRYOLIFE  FEMORAL-POPLITEAL ARTERY SIZE: OUTER DIAMETER: 7MM - 6MM;  Surgeon: Chadwick Lozano;  Location:  OR    BYPASS GRAFT FEMOROPOPLITEAL Right 5/26/2023    Procedure: RIGHT DISTAL SUPERFICIAL FEMORAL GRAFT TO ANTERIOR TIBIAL ARTERY WITH 26 CENTIMETER CADAVERIC SUPERFICIAL FEMORAL ARTERY GRAFT;  Surgeon: Chadwick Lozano MD;  Location:  OR    BYPASS GRAFT FEMOROPOPLITEAL Right 10/11/2023    Procedure: RIGHT FEMORAL TO POPLITEAL GRAFT THROMBECTOMY;  Surgeon: Chadwick Lozano MD;  Location:  OR    BYPASS GRAFT INSITU FEMOROPOPLITEAL Right 7/7/2021    Procedure: CREATION RIGHT FEMORAL TO POPLITEAL ARTERIAL BYPASS USING INSITU VEIN GRAFT;  Surgeon: José Luis Hernandez MD;  Location:  OR    CARDIAC CATHERIZATION  09/03/2009    multivessel CAD without target lesions, med mgmt indicated, preserved ef    CARPAL TUNNEL RELEASE RT/LT Right 05/20/2021    COLONOSCOPY N/A 12/12/2023    Procedure: Colonoscopy;  Surgeon: Corey Hoffman MD;  Location:  GI    COLONOSCOPY N/A 12/14/2023    Procedure: Colonoscopy;  Surgeon: Corey Hoffman MD;  Location:  GI    CV CORONARY ANGIOGRAM N/A 10/4/2023    Procedure: Coronary Angiogram;  Surgeon: Rogelio Ricks MD;  Location:  [facility]    CV PCI N/A 10/4/2023    Procedure: Percutaneous Coronary Intervention;  Surgeon: Rogelio Ricks MD;  Location:  [facility]    EMBOLECTOMY LOWER EXTREMITY Right 10/6/2023    Procedure: Right anterior tibial bypass with graft, Right tibial endarterectomy,thrombectomy, Right doraslis  pedis thrombectomy, Anterior Tibial vein patch.;  Surgeon: Chadwick Lozano MD;  Location:  OR    ENDARTERECTOMY CAROTID Right 05/11/2016    Procedure: ENDARTERECTOMY CAROTID;  Surgeon: Chadwick Lozano MD;  Location:  OR    ENDARTERECTOMY CAROTID Left 06/08/2020    Procedure: LEFT CAROTID ENDARTERECTOMY with distal facal vein patch  and EEG;  Surgeon: Chadwick Lozano MD;  Location:  OR    ESOPHAGOSCOPY, GASTROSCOPY, DUODENOSCOPY (EGD), COMBINED N/A 12/12/2023    Procedure: Esophagoscopy, gastroscopy, duodenoscopy (EGD), combined;  Surgeon: Corey Hoffman MD;  Location:  GI    FINE NEEDLE ASPIRATION WITHOUT IMAGING GUIDANCE Right 9/22/2023    Procedure: Fine needle aspiration without imaging guidance;  Surgeon: Kiersten Aguilera MD;  Location:  GI    FLUORESCENCE INTRAOPERATIVE VASCULAR ANGIOGRAPHY Right 12/28/2022    Procedure: RIGHT LEG ANGIOGRAM with intervention;  Surgeon: Chadwick Lozano MD;  Location:  OR    GYN SURGERY  left tube    left salpingectomy    IR ANGIOGRAM THROUGH CATHETER (ARTERIAL)  10/6/2023    IR ANGIOGRAM THROUGH CATHETER (ARTERIAL)  10/6/2023    IR LOWER EXTREMITY ANGIOGRAM RIGHT  05/25/2021    IR LOWER EXTREMITY ANGIOGRAM RIGHT  10/5/2023    IR OR ANGIOGRAM  6/23/2021    IR OR ANGIOGRAM  12/28/2022    LAPAROSCOPIC CHOLECYSTECTOMY N/A 09/27/2017    Procedure: LAPAROSCOPIC CHOLECYSTECTOMY;  LAPAROSCOPIC CHOLECYSTECTOMY;  Surgeon: Jacoby Tapia MD;  Location:  [facility]    LAPAROSCOPY DIAGNOSTIC (GENERAL) N/A 8/11/2021    Procedure: Exploratory laparoscopy,  laparoscopic lysis of adhesions, laparotomy;  Surgeon: Corina Ferreira MD;  Location:  OR    OR ANGIOGRAM, LOWER EXTREMITY Right 10/11/2023    Procedure: Or Angiogram, Lower Extremity;  Surgeon: Chadwick Lozano MD;  Location:  OR    ORTHOPEDIC SURGERY      left knee surgery    REPAIR ANEURYSM FEMORAL ARTERY Left 12/28/2022    Procedure: REPAIR LEFT FEMORAL  PSEUDOANEURYSM;  Surgeon: Chadwick Lozano MD;  Location: [facility] OR    VASCULAR SURGERY  aoto bi fem  left fem-AK pop    [facility] [email or web address] FABRIC WRAPPING OF ABDOMINAL ANEURYSM      [facility] [[email or web address] NONSPECIFIC PROCEDURE  12/2000    angioplasty with stent right fem. a.    [facility] [[email or web address]] NONSPECIFIC PROCEDURE  1987    sinus surgery    [facility] [[email or web address]] NONSPECIFIC PROCEDURE  09/02/2009    Emergent left groin exploration with oversewing of bleeding angiographic site. 2. Endarterectomy of common femoral-proximal superficial femoral artery with greater saphenous vein patch angioplasty.   a. [place] of accessory right greater saphenous vein.     [facility] [[email or web address]] NONSPECIFIC PROCEDURE  08/27/2009    occluded left common iliac and external iliac arteries were successfully revascularized with stenting to 8 and 7 mm         Family History   Problem Relation Age of Onset    Cancer Mother         bladder    Cardiovascular Father         alive,multiple heart attacks    Diabetes Maternal Grandmother     Lung Cancer Maternal Grandmother     Blood Disease Brother         clotting disorder       Social History     Socioeconomic History    Marital status:      Spouse name: Not on file    Number of children: Not on file    Years of education: Not on file    Highest education level: Not on file   Occupational History    Not on file   Tobacco Use    Smoking status: Some Days     Packs/day: 0.25     Years: 52.00     Additional pack years: 0.00     Total pack years: 13.00     Types: Cigarettes     Start date: 1968    Smokeless tobacco: Never    Tobacco comments:     1/2 PPD   Vaping Use    Vaping Use: Never used   Substance and Sexual Activity    Alcohol use: Not Currently     Comment: x1-2 yr    Drug use: Yes     Types: Marijuana     Comment: 2x per day    Sexual activity: Yes     Partners: Male     Birth control/protection: Surgical   Other Topics Concern    Parent/sibling w/ CABG, MI or angioplasty before 65F 55M? Not Asked   Social History Narrative    Not on file     Social Determinants  Assessment and Plan: 	  Patient is 43y Male with PMHx of sickle cell disease admitted for anemia concerning for sickle cell crisis s/p ureteroscopy w/ L kidney biopsy 3/19, post-operative course c/b acute hemorrhagic shock, PEA arrest with ROSC; currently with acute renal failure on HD and global anoxic brain injury and urothelial cancer; s/p tracheostomy and pending GOC discussions to decide on possible interventions and dispo. GI consulted for melena & downtrending hemoglobin and pending EGD.    NEURO:  #Global anoxic brain injury   AAOx0. On sedation with no further myoclonus. Guarded prognosis.   - Attempt to wean off propofol and increase Valium to 40 mg TID PO (since going for EGD today, will put in IVP Valium 10 mg Q4H standing and 10 mg Q4H PRN for myoclonus)  - Repeat CT B 3/24 showing diffuse sulcal effacement with increased intracranial pressure, and few patchy foci of cortical blurring   - 3/25 MRIB with diffuse global abnormal supratentorial cortical restricted diffusion consistent with global hypoxic injury     #Seizures  No further myoclonus of upper body after restarting propofol.   - Witness myoclonic jerking concerning for seizures iso anoxic brain injury  - s/p 2.5g keppra load & midazolam 2mg IV q8 PRN   - vEEG report: "Abundant myoclonic seizures in the setting of a severe diffuse/multifocal cerebral dysfunction which can be seen in the setting of sedative effect or due to an anoxic injury, with improvement after 20:50 suggesting a lowering of sedation"; will f/u with neuro recommendations. Repeat vEEG 3/24 showing severe cerebral dysfunction   -C/w levetiracetam 500 mg po BID & Valium as above. Attempt to wean off propofol completely.      CV:  #Shock - hemorrhagic shock s/p kidney biopsy s/p 5u pRBC, 3 plasma, 3 plt  last dose of lovenox AC on 3/18 at 5PM   Hgb 8 -> ~3.  Current Hg = 5-6  - Not on levophed, MAP goal>65  - Monitor cbc q24  - Midodrine 10mg TID & trend CBC daily for Hg goal   - Hg goal >6 per heme/onc  - GI consulted given downtrending Hgb, melanotic stool, and positive stool occult blood feces. Pending EGD    #Hx of VTE (R IJ thrombus, L brachial DVT)  - CTM, hold AC given hemorrhage  - Bullae present on arm with DVT  - Appreciate orthopedics hand surgery consult, not compartment syndrome     PULM:  #Acute hypoxic respiratory failure  #Pna- 2/2 ventilation requirement  #Tracheostomy   CXR with R lung field and left mid and lower lung field hazy opacities.  - continue with duonebs Q6H & s/p HTS  - 3/28 CXR showing increasing RUL consolidation   -4/4 bedside tracheostomy   -S/p Abx completion course as below and currently on meropenem 500 mg Q24H (04/02 - 04/07)  -C/w trach care & wean vent settings as tolerated    RENAL:  #Acute renal failure.   #Renal mass s/p biopsy -   #Acidosis  Oliguric & receiving Bumex IVP PRN per nephro recs  Acute renal failure s/p cardiac arrest most likely 2/2 ATN requiring intermittent HD with nephro following  - TOV 3/28, failed; replaced daniel on 4/1; Attempt TOV when possible  - C/w Doxazosin  - Trend Cr, UOP, lytes  - S/p bicarb drip completion  - C/w sodium bicarbonate 650 mg po TID   - 3/31 removed L IJV shiley; 4/2 placed R fem shiley that was removed on 04/08  - Per pathology result of renal biopsy, noninvasive urothelial malignancy   - HD sessions per nephro. Removed R. femoral line (04/08) & patient will need HD access pending GOC discussion & daily labs. IR consulted for TDC and pending GOCs    GI:  #Shock liver  Unchanged  LFTs. Jaundice with hyperbilirubinemia (direct).   - Trend liver enzymes  - RUQ US showing enlarged periportal and periaortic enlarged lymph nodes and enlarged left lateral liver lobe and CBD 9 mm.     #Occult blood in feces  Positive Occult blood with dropping Hgb daily below goal of 6 ( Hx of SS). Patient with melena during hospital stay requiring pRBC to meet goal and with inappropriate Hgb response.  S/p 7 U of pRBCs since the beginning of April given downtrending Hgb  Maintain active T & S  C/w PPI 40 mg IV BID for now.  GI consulted given downtrending Hgb, melanotic stool, and positive stool occult blood feces. Pending EGD    #Diet:  - NPO except meds. Re-assess if can tolerate TFs after EGD.  - GI unable to place PEG due to hepatomegaly.   - Per IR consult: Given no safe window for endoscopic placement, recommend surgical consultation.   - Surgery consulted  - EGD will be done, but holding off on PEG given fevers and bleeds.      HEMATOLOGIC/ONCOLGOGIC   #Sickle cell crisis--Resolved  #Acute blood loss anemia- 2/2 sickle cells and uremia vs critical illness vs GIB  Downtrending direct hyperbilirubinemia,  gradually & Increasing reticulocyte count.   - Heme following & recs appreciated  - D/c deferasirox due to current renal failure   - S/p 7 U of pRBC since beginning of April  - Occult blood positive. C/w PPI 40 mg IV BID for now.  - Transfuse as needed Hb >6 goal per Heme.  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 CXR showing increased RUL opacification.   - D/c  aztreonam 2000 mg IV qd ( started 3/22- 3/27)   - S/p vancomycin 1500 mg IV qd (4/2); 2nd dose 4/4, 3rd dose 4/5  - 4/2- CXR showing increasing RUL consolidation  - Completed 5d meropenem 500 mg IV qd (started 3/27-3/31); (4/2-4/7). Hold off Meropenem after 5 day course   - CTM resp status    MSK:  #infiltration  Infiltration of sodium bicarb into left arm, now with arm distension and bullae. Elevated uric acid with nodule suggestive of podagra however no signs of active gout flare.   -Appreciate orthopedic hand surgery, not compartment syndrome   - CTM    Ethics: Full Code   Pending: Discussion with family   Palliative Team: Consulted   of Health     Financial Resource Strain: Low Risk  (1/8/2024)    Financial Resource Strain     Within the past 12 months, have you or your family members you live with been unable to get utilities (heat, electricity) when it was really needed?: No   Food Insecurity: Low Risk  (1/8/2024)    Food Insecurity     Within the past 12 months, did you worry that your food would run out before you got money to buy more?: No     Within the past 12 months, did the food you bought just not last and you didn t have money to get more?: No   Transportation Needs: Low Risk  (1/8/2024)    Transportation Needs     Within the past 12 months, has lack of transportation kept you from medical appointments, getting your medicines, non-medical meetings or appointments, work, or from getting things that you need?: No   Physical Activity: Not on file   Stress: Not on file   Social Connections: Not on file   Interpersonal Safety: Low Risk  (11/13/2023)    Interpersonal Safety     Do you feel physically and emotionally safe where you currently live?: Yes     Within the past 12 months, have you been hit, slapped, kicked or otherwise physically hurt by someone?: No     Within the past 12 months, have you been humiliated or emotionally abused in other ways by your partner or ex-partner?: No   Housing Stability: Low Risk  (1/8/2024)    Housing Stability     Do you have housing? : Yes     Are you worried about losing your housing?: No       Outpatient Encounter Medications as of 3/28/2024   Medication Sig Dispense Refill    acetaminophen (TYLENOL) 500 MG tablet Take 500-1,000 mg by mouth every 6 hours as needed for mild pain      amLODIPine (NORVASC) 2.5 MG tablet Take 1 tablet (2.5 mg) by mouth daily 30 tablet 4    augmented betamethasone dipropionate (DIPROLENE AF) 0.05 % external cream Apply topically 2 times daily as needed (skin rash) 50 g 2    Calcium Carb-Cholecalciferol (CALCIUM CARBONATE-VITAMIN D3) 600-400 MG-UNIT TABS TAKE ONE TABLET BY  MOUTH TWICE DAILY 180 tablet 3    carvedilol (COREG) 6.25 MG tablet Take 1 tablet (6.25 mg) by mouth 2 times daily (with meals) 60 tablet 11    clopidogrel (PLAVIX) 75 MG tablet Take 1 tablet (75 mg) by mouth daily 90 tablet 3    diphenhydrAMINE (BENADRYL) 25 MG tablet take Benadryl 25-50 mg one hour prior to the procedure 2 tablet 0    empagliflozin (JARDIANCE) 10 MG TABS tablet Take 1 tablet (10 mg) by mouth daily 90 tablet 1    ferrous sulfate (FEROSUL) 325 (65 Fe) MG tablet Take 1 tablet (325 mg) by mouth every other day 60 tablet 1    fluticasone-vilanterol (BREO ELLIPTA) 200-25 MCG/ACT inhaler Inhale 1 puff into the lungs daily 120 each 11    gabapentin (NEURONTIN) 100 MG capsule Take 1 capsule (100 mg) by mouth 3 times daily 270 capsule 3    lisinopril (ZESTRIL) 10 MG tablet Take 1 tablet (10 mg) by mouth daily 30 tablet 4    nicotine (NICODERM CQ) 14 MG/24HR 24 hr patch Place 1 patch onto the skin daily 30 patch 1    nitroGLYcerin (NITROSTAT) 0.4 MG sublingual tablet Place 1 tablet (0.4 mg) under the tongue See Admin Instructions for chest pain 30 tablet 11    omeprazole (PRILOSEC) 20 MG DR capsule TAKE ONE CAPSULE BY MOUTH DAILY 90 capsule 3    polyethylene glycol (MIRALAX) 17 GM/Dose powder Take 17 g by mouth daily Hold for diarrhea or loose stools (Patient taking differently: Take 17 g by mouth daily as needed for constipation Hold for diarrhea or loose stools) 510 g 0    rivaroxaban ANTICOAGULANT (XARELTO) 15 MG TABS tablet Take 1 tablet (15 mg) by mouth daily (with dinner) 90 tablet 0    rosuvastatin (CRESTOR) 40 MG tablet Take 1 tablet (40 mg) by mouth At Bedtime 90 tablet 3    senna-docusate (SENOKOT-S/PERICOLACE) 8.6-50 MG tablet Take 1 tablet by mouth 2 times daily      senna-docusate (SENOKOT-S/PERICOLACE) 8.6-50 MG tablet Take 1 tablet by mouth daily as needed for constipation 30 tablet 0    triamcinolone (KENALOG) 0.1 % external ointment Apply topically 2 times daily as needed for irritation  Apply to back 60 g 3     No facility-administered encounter medications on file as of 3/28/2024.             O:   NAD, WDWN, Alert & Oriented, Mood & Affect wnl, Vitals stable   General appearance normal   Vitals stable   Alert, oriented and in no acute distress     Eczematous plaques and dermatographia on trunk       Eyes: Conjunctivae/lids:Normal     ENT: Lips, buccal mucosa, tongue: normal    MSK:Normal    Cardiovascular: peripheral edema none    Pulm: Breathing Normal    Neuro/Psych: Orientation:Alert and Orientedx3 ; Mood/Affect:normal       A/P:  Spong derm and dermatographia  Skin care discussed with patient   Claritin in am  Zyrtec pm   Betamethasone twice daily  Return to clinic 6 weeks  It was a pleasure speaking to Shirley Hendricks today.  Previous clinic notes and pertinent laboratory tests were reviewed prior to Shirley Hendricks's visit.  Skin care regimen reviewed with patient: Eliminate harsh soaps, i.e. Dial, zest, irsih spring; Mild soaps such as Cetaphil or Dove sensitive skin, avoid hot or cold showers, aggressive use of emollients including vanicream, cetaphil or cerave discussed with patient.

## 2025-04-18 NOTE — BRIEF OP NOTE
[facility]    Brief Operative Note    Pre-operative diagnosis: Critical ischemia of extremity with history of revascularization of same extremity [I99.8, Z95.9]  Post-operative diagnosis Same as pre-operative diagnosis    Procedure:   1) Right previous cadaveric femoral artery (above-knee incision) to native below knee popliteal artery using left arm reversed cephalic vein    Surgeon: Surgeon(s) and Role:     * José Luis Hernandez MD - Primary     * Justice Beach MD - Assisting  Anesthesia: General     Estimated blood loss: 200 ml    Drains: None    Specimens: None    Findings:   Augmented signal in the distal popliteal artery and anterior tibial artery. Harvested left cephalic vein.     Complications: None.  Implants: * No implants in log *         9600 ml thin clear yellow ascitic fluid obtained.  No sample sent to lab.  Site closed with topical skin adhesive.  No s\s of leakage or bleeding.    Pt verbalized understanding of discharge instruction time allowed for questions.

## 2025-04-25 ENCOUNTER — VIRTUAL VISIT (OUTPATIENT)
Dept: INTERNAL MEDICINE | Facility: CLINIC | Age: 65
End: 2025-04-25
Payer: COMMERCIAL

## 2025-04-25 DIAGNOSIS — E55.9 VITAMIN D DEFICIENCY: ICD-10-CM

## 2025-04-25 DIAGNOSIS — N18.31 STAGE 3A CHRONIC KIDNEY DISEASE (H): ICD-10-CM

## 2025-04-25 DIAGNOSIS — E61.1 IRON DEFICIENCY: ICD-10-CM

## 2025-04-25 DIAGNOSIS — I48.20 CHRONIC ATRIAL FIBRILLATION (H): ICD-10-CM

## 2025-04-25 DIAGNOSIS — Z89.611 HX OF AKA (ABOVE KNEE AMPUTATION), RIGHT (H): ICD-10-CM

## 2025-04-25 DIAGNOSIS — M25.511 RIGHT SHOULDER PAIN, UNSPECIFIED CHRONICITY: Primary | ICD-10-CM

## 2025-04-25 PROCEDURE — 98006 SYNCH AUDIO-VIDEO EST MOD 30: CPT | Performed by: INTERNAL MEDICINE

## 2025-04-25 NOTE — PROGRESS NOTES
Shirley is a 64 year old who is being evaluated via a billable video visit.    How would you like to obtain your AVS? MyChart  If the video visit is dropped, the invitation should be resent by: Text to cell phone: [phone]  Will anyone else be joining your video visit? No       ASSESSMENT:   1. Right shoulder pain, unspecified chronicity (Primary)  Pain with abduction.  Possible rotator cuff issue.  Worsened with wheelchair use.  At risk for further strain when having to use parallel bars with physical therapy once starts to use her right leg prosthesis.   Has been doing limited therapy exercises from home therapist but exacerbates pain.  Symptoms mostly with abduction but some with internal/external rotation of the shoulder suggesting probable combination of rotator cuff and shoulder joint pathology.  Patient limited in transport.  Will therefore refer patient to Ortho for further evaluation and treatment and defer imaging to that visit as deemed necessary by the orthopedic provider  - Orthopedic  Referral; Future    2. Hx of AKA (above knee amputation), right (H)  Stable.  Planning on starting prosthesis use soon and will need to do physical therapy including parallel bar use.  See #1 above re: need to address shoulder issue to make  LE therapy tolerable    3. Iron deficiency  Low serum iron levels and iron sats but elevated ferritin and normal hemoglobin which is improved. ? ACD with hx lymphoma. Defer iron supplementation for now and recheck labs in May when pt sees oncology  - Hemoglobin; Future  - Iron & Iron Binding Capacity; Future  - Ferritin; Future    4. Vitamin D deficiency  Now on calcium/vitamin D over-the-counter per patient.  Recheck level with next blood draw.  History of CKD  - Vitamin D Deficiency; Future    5. Stage 3a chronic kidney disease (H)  Most recent GFR improved to 55 last month.  Repeat labs in May as previously ordered     6. Chronic atrial fibrillation (H)  On long-term  anticoagulation with Eliquis.  Patient instructed to hold Eliquis 2 days prior to future Ortho appointment in case cortisone injection done right shoulder and will then restart Eliquis later the evening of the Ortho appointment as usual        PLAN:  Continue current medications  Referral to [facility] for evaluation of right shoulder issues.  Central scheduling will call you to schedule your appointment or you may call [phone].   Lab 5/22/2025 as ordered when following up with Dr White (Oncology) ask  at that time to draw lab orders I have placed in the computer in addition to those ordered by oncology  Hold Eliquis 2 days prior to your future orthopedic appointment in case cortisone injection done for right shoulder pain.  Resume Eliquis later that evening of the Ortho appointment date      Video-Visit Details    Type of service:  Video Visit    Video Start Time: 4:35 PM    Video End Time: 5:00 PM    Originating Location (pt. Location): Home    Distant Location (provider location):  [facility]     Platform used for Video Visit: Debby Watson is a 64 year old, presenting for the following health issues:  Arm Pain        History of Present Illness       Reason for visit:  Right shoulder  Symptom onset:  More than a month  Symptoms include:  Pain in right shoulder when lifting things up high, stays in pain for awhile after lifting up  Symptom intensity:  Moderate  Symptom progression:  Worsening  Had these symptoms before:  No   She is taking medications regularly.        Most recent lab results reviewed with pt.      HPI:  History right shoulder pain for over a month.  Started after patient had to use a wheelchair after her right AKA, especially when reaching back behind her grabbing the wheels of the wheelchair.  Will be finishing up home OT/PT.  Patient states therapist has been doing some work on her shoulder including light weights but  caused increasing pain and patient has discontinued those therapies.  Using occasional Tylenol with improvement in pain but not full relief.  Denies numbness or weakness in the right arm.  History right AKA and will be starting prosthesis you soon.  However, as part of the physical therapy for prosthesis use, patient will have to be ambulating using parallel bars which is likely to strain her right shoulder further.  Has serum iron deficiency with normal hemoglobin and elevated ferritin level.  Denies seeing any blood in her stools followed by oncology and has a follow-up with them 5/22/2025.  CKD results improved.  Has follow-up lab for kidneys and lipids in May when seeing oncology.  Breathing stable.  Denies exertional chest pain or abdominal pain     Additional ROS:   Constitutional, HEENT, Cardiovascular, Pulmonary, GI and , Neuro, MSK and Psych review of systems/symptoms are otherwise negative or unchanged from previous, except as noted above.           Objective :  No vitals obtained today    Physical Exam:  GENERAL: alert and no distress  EYES: Eyes grossly normal to inspection, conjunctivae and sclerae normal  RESP: no audible wheeze, cough, or visible cyanosis.  No visible retractions or increased work of breathing.  Able to speak fully in complete sentences.  NEURO: Cranial nerves grossly intact, mentation intact and speech normal  PSYCH: mentation appears normal, affect normal/bright, judgement and insight intact, normal speech and appearance well-groomed       Vasquez Benoit MD  Internal Medicine Department  [facility]  Internal Medicine Department      (Chart documentation was completed, in part, with [employer] voice-recognition software. Even though reviewed, some grammatical, spelling, and word errors may remain.)

## 2025-04-27 PROBLEM — I48.20 CHRONIC ATRIAL FIBRILLATION (H): Status: ACTIVE | Noted: 2025-04-27

## 2025-04-27 PROBLEM — E55.9 VITAMIN D DEFICIENCY: Status: ACTIVE | Noted: 2025-04-27

## 2025-04-27 PROBLEM — M25.511 RIGHT SHOULDER PAIN, UNSPECIFIED CHRONICITY: Status: ACTIVE | Noted: 2025-04-27

## 2025-04-27 PROBLEM — E87.5 HYPERKALEMIA: Status: RESOLVED | Noted: 2024-06-21 | Resolved: 2025-04-27

## 2025-04-27 PROBLEM — L08.9 WOUND INFECTION: Status: RESOLVED | Noted: 2024-05-15 | Resolved: 2025-04-27

## 2025-04-27 PROBLEM — T14.8XXA WOUND INFECTION: Status: RESOLVED | Noted: 2024-05-15 | Resolved: 2025-04-27

## 2025-04-27 PROBLEM — R10.10 PAIN OF UPPER ABDOMEN: Status: RESOLVED | Noted: 2024-11-20 | Resolved: 2025-04-27

## 2025-04-27 PROBLEM — Z89.611 HX OF AKA (ABOVE KNEE AMPUTATION), RIGHT (H): Status: ACTIVE | Noted: 2025-04-27

## 2025-04-27 PROBLEM — E61.1 IRON DEFICIENCY: Status: ACTIVE | Noted: 2025-04-27

## 2025-04-27 PROBLEM — N18.31 STAGE 3A CHRONIC KIDNEY DISEASE (H): Status: ACTIVE | Noted: 2025-04-27

## 2025-04-27 RX ORDER — CHOLECALCIFEROL (VITAMIN D3) 50 MCG
1 TABLET ORAL DAILY
COMMUNITY
Start: 2025-04-27

## 2025-05-01 ENCOUNTER — TELEPHONE (OUTPATIENT)
Dept: INTERNAL MEDICINE | Facility: CLINIC | Age: 65
End: 2025-05-01
Payer: COMMERCIAL

## 2025-05-01 NOTE — TELEPHONE ENCOUNTER
Home Care is calling regarding an established patient with [facility].  Requesting orders from: Vasquez Benoit RN APPROVED: RN able to provide verbal orders.  Home Care will send orders for signature.  RN will close encounter.  Is this a request for a temporary pause in the home care episode?  No    Orders Requested    Skilled Nursing  Request for discontinuation of care   Goals have been met/progressing.  Frequency: Still has pleurex drain but family takes care of this   RN gave verbal order: Yes        Contacts       Contact Date/Time Type Contact Phone/Fax    05/01/2025 09:35 AM CDT Phone (Incoming) MARISSA Alfonso [facility] (Home Care) [phone]     Secure line          Nehal Rodgers RN

## 2025-05-04 ENCOUNTER — HEALTH MAINTENANCE LETTER (OUTPATIENT)
Age: 65
End: 2025-05-04

## 2025-05-05 ENCOUNTER — HOSPITAL ENCOUNTER (OUTPATIENT)
Dept: CT IMAGING | Facility: CLINIC | Age: 65
Discharge: HOME | End: 2025-05-05
Attending: INTERNAL MEDICINE | Admitting: INTERNAL MEDICINE
Payer: COMMERCIAL

## 2025-05-05 DIAGNOSIS — C83.01 SMALL CELL B-CELL LYMPHOMA, LYMPH NODES OF HEAD, FACE, AND NECK (H): ICD-10-CM

## 2025-05-05 PROCEDURE — 74176 CT ABD & PELVIS W/O CONTRAST: CPT

## 2025-05-07 DIAGNOSIS — Z53.9 DIAGNOSIS NOT YET DEFINED: Primary | ICD-10-CM

## 2025-05-08 ENCOUNTER — ANCILLARY PROCEDURE (OUTPATIENT)
Dept: GENERAL RADIOLOGY | Facility: CLINIC | Age: 65
End: 2025-05-08
Attending: FAMILY MEDICINE
Payer: COMMERCIAL

## 2025-05-08 ENCOUNTER — OFFICE VISIT (OUTPATIENT)
Dept: ORTHOPEDICS | Facility: CLINIC | Age: 65
End: 2025-05-08
Attending: INTERNAL MEDICINE
Payer: COMMERCIAL

## 2025-05-08 DIAGNOSIS — G89.29 CHRONIC RIGHT SHOULDER PAIN: ICD-10-CM

## 2025-05-08 DIAGNOSIS — M25.511 RIGHT SHOULDER PAIN, UNSPECIFIED CHRONICITY: ICD-10-CM

## 2025-05-08 DIAGNOSIS — M25.511 CHRONIC RIGHT SHOULDER PAIN: ICD-10-CM

## 2025-05-08 RX ORDER — TRIAMCINOLONE ACETONIDE 40 MG/ML
40 INJECTION, SUSPENSION INTRA-ARTICULAR; INTRAMUSCULAR
Status: COMPLETED | OUTPATIENT
Start: 2025-05-08 | End: 2025-05-08

## 2025-05-08 RX ORDER — LIDOCAINE HYDROCHLORIDE 10 MG/ML
4 INJECTION, SOLUTION INFILTRATION; PERINEURAL
Status: COMPLETED | OUTPATIENT
Start: 2025-05-08 | End: 2025-05-08

## 2025-05-08 RX ADMIN — TRIAMCINOLONE ACETONIDE 40 MG: 40 INJECTION, SUSPENSION INTRA-ARTICULAR; INTRAMUSCULAR at 11:09

## 2025-05-08 RX ADMIN — LIDOCAINE HYDROCHLORIDE 4 ML: 10 INJECTION, SOLUTION INFILTRATION; PERINEURAL at 11:09

## 2025-05-08 NOTE — LETTER
5/8/2025      Shirley Hendricks  [address]  [place] [address]      Dear Colleague,    Thank you for referring your patient, Shirley Hendricks, to the [facility] [place]. Please see a copy of my visit note below.    CHIEF COMPLAINT:  Pain of the Right Shoulder     HISTORY OF PRESENT ILLNESS  Ms. Hendricks is a pleasant 64 year old year old female past medical history of stage Mark extranodal marginal zone lymphoma, severe peripheral vascular disease status post right AKA April 2024, stage III CKD who presents to clinic today with right shoulder pain.  Shirley explains that she has occasional events of pain/numbness which is associated with the anterior aspect of her shoulder.  She notes pain and paresthesias can travel to posterior right shoulder and down past elbow to hand rarely.  She notes difficulty with transfers from wheel chair to her chairs, bed when lifting herself up.  She has a prosthesis after her AKA but is still waiting on scheduling her PT to start process of walking with her AKA.      Onset: gradual  Location: right shoulder, anterior  Quality:  stabbing and sharp  Duration: 6 months   Severity: 4/10 at worst  Timing: intermittent episodes with transferring, direct pressure, abduction  Modifying factors:  resting and non-use makes it better, movement and use makes it worse  Associated signs & symptoms: pain, tenderness, and numbness  Previous similar pain: No  Treatments to date: Tylenol    Additional history: as documented    Review of Systems:  A 10-point review of systems was obtained and is negative except for as noted in the HPI.       MEDICAL HISTORY  Patient Active Problem List   Diagnosis     Reflux esophagitis     Osteoporosis     Cervicalgia     Hyperlipidemia LDL goal <70     PAD (peripheral artery disease)     Essential hypertension     Coronary artery disease involving native coronary artery of native heart without angina pectoris     Primary  osteoarthritis involving multiple joints     DDD (degenerative disc disease), lumbar     S/P carotid endarterectomy     Chronic allergic rhinitis due to animal hair and dander     Tobacco use disorder     Anxiety     Vitamin C deficiency     History of colonic polyps     Bilateral carpal tunnel syndrome     Charcot-Breonna-Tooth disease type 1A     Atherosclerosis of bypass graft of right lower extremity with other clinical manifestation     Pseudoaneurysm of femoral artery following procedure     NSTEMI (non-ST elevated myocardial infarction) (H)     Acute reaction to situational stress     Acute on chronic anemia     Lymphoma of lymph nodes of neck, unspecified lymphoma type (H)     Arterial occlusion     S/P AKA (above knee amputation) unilateral, right (H)     Hypoxia     Pleural effusion     Shortness of breath     Hypertensive renal disease     Marginal zone lymphoma (H)     Multiple pulmonary nodules     Rash     Polyp of colon     Stage 3b chronic kidney disease (H)     Celiac disease     Benign neoplasm of ascending colon     Acquired absence of right leg above knee (H)     Adverse effect of iron and its compounds, subsequent encounter     Chronic atrial fibrillation (H)     Stage 3a chronic kidney disease (H)     Vitamin D deficiency     Iron deficiency     Hx of AKA (above knee amputation), right (H)     Right shoulder pain, unspecified chronicity       Current Outpatient Medications   Medication Sig Dispense Refill     acetaminophen (TYLENOL) 500 MG tablet Take 1-2 tablets (500-1,000 mg) by mouth every 6 hours as needed for mild pain 60 tablet 0     albuterol (PROAIR HFA/PROVENTIL HFA/VENTOLIN HFA) 108 (90 Base) MCG/ACT inhaler Inhale 2 puffs into the lungs every 4 hours as needed for shortness of breath, wheezing or cough. 18 g 0     amLODIPine (NORVASC) 10 MG tablet TAKE ONE TABLET BY MOUTH ONE TIME DAILY 90 tablet 3     apixaban ANTICOAGULANT (ELIQUIS) 2.5 MG tablet Take 1 tablet (2.5 mg) by mouth 2  times daily. 180 tablet 3     augmented betamethasone dipropionate (DIPROLENE AF) 0.05 % external cream Betamethasone Augmented Topical Cream 0.05 %        active       budesonide-formoterol (SYMBICORT) 160-4.5 MCG/ACT Inhaler Inhale 2 puffs into the lungs two times daily Disp 3 inhalers 30.6 g 3     Calcium-Vitamin D-Vitamin K (CALCIUM + D PO) Calcium-Cholecalciferol Oral 600 mg-10 mcg (400 unit)        active       carvedilol (COREG) 12.5 MG tablet Take 1 tablet (12.5 mg) by mouth 2 times daily (with meals). 180 tablet 3     cephALEXin (KEFLEX) 500 MG capsule Cephalexin Oral     take one twice daily   active       clopidogrel (PLAVIX) 75 MG tablet Take 1 tablet (75 mg) by mouth daily. 90 tablet 3     denosumab (PROLIA) 60 mg/mL injection Denosumab Subcutaneous (60 mg/mL - Prolia)        active       diphenhydrAMINE HCl (BENADRYL ALLERGY PO) Diphenhydramine Oral        active       FERROUS SULFATE CR PO Ferrous Sulfate Oral     take every other day   active       fluticasone (ARNUITY ELLIPTA) 200 MCG/ACT inhaler Fluticasone-Vilanterol Inhaler 200 mcg-25 mcg/dose        active       gabapentin (NEURONTIN) 100 MG capsule TAKE TWO CAPSULES BY MOUTH DAILY AT BEDTIME 180 capsule 1     hydrALAZINE (APRESOLINE) 25 MG tablet Take 1 tablet (25 mg) by mouth 3 times daily. 90 tablet 11     ipratropium - albuterol 0.5 mg/2.5 mg/3 mL (DUONEB) 0.5-2.5 (3) MG/3ML neb solution Take 1 vial (3 mLs) by nebulization every 6 hours as needed for shortness of breath, wheezing or cough. 360 mL 0     isosorbide mononitrate CR (IMDUR) 120 MG 24 HR ER tablet Take 1 tablet (120 mg) by mouth daily. 90 tablet 3     lisinopril (ZESTRIL) 5 MG tablet Lisinopril Oral     daily   active       multivitamin RENAL (TRIPHROCAPS) 1 capsule capsule Take 1 tablet by mouth.       nitroGLYcerin (NITROSTAT) 0.4 MG sublingual tablet Place 1 tablet (0.4 mg) under the tongue See Admin Instructions for chest pain 30 tablet 11     OMEPRAZOLE PO Omeprazole Oral  Delayed Release Capsule     daily   active       rosuvastatin (CRESTOR) 10 MG tablet Take 1 tablet (10 mg) by mouth at bedtime. 90 tablet 0     senna (SENOKOT) 8.6 MG tablet Sennosides Oral     once daily    active       torsemide (DEMADEX) 20 MG tablet Take 1 tablet (20 mg) by mouth 2 times daily. 60 tablet 3     triamcinolone (KENALOG) 0.5 % external ointment Triamcinolone Topical Cream 0.1 %        active       vitamin D3 (CHOLECALCIFEROL) 50 mcg (2000 units) tablet Take 1 tablet (50 mcg) by mouth daily.         Allergies   Allergen Reactions     Contrast Dye Anaphylaxis     Pt reported facial and throat swelling with prior CT contrast     Gluten Meal Diarrhea and GI Disturbance     Hx of celiac      Pantoprazole Swelling     Penicillin G Itching and Rash     Penicillins Itching and Rash     Varenicline      Nightmares       Family History   Problem Relation Age of Onset     Cancer Mother         bladder     Cardiovascular Father         alive,multiple heart attacks     Diabetes Maternal Grandmother      Lung Cancer Maternal Grandmother      Blood Disease Brother         clotting disorder       Additional medical/Social/Surgical histories reviewed in EPIC and updated as appropriate.       PHYSICAL EXAM  LMP  (LMP Unknown)     General  - normal appearance, in no obvious distress  HEENT  - conjunctivae not injected, moist mucous membranes  CV  - normal radial pulse  Pulm  - normal respiratory pattern, non-labored  Musculoskeletal - cervical spine  - inspection: normal bone and joint alignment, no obvious kyphosis  - palpation: no paravertebral or bony tenderness  - ROM: normal and painless flexion, extension, left and right sidebending and rotation  - strength: upper extremities 5/5 in all planes  - special tests:  (-) Spurling bilaterally  Musculoskeletal - Right shoulder  - inspection: normal bone and joint alignment, no obvious deformity, no scapular winging, no AC step-off  - palpation:Tender infraspinatus and  greater tuberosity.  - ROM:  160 deg flexion   45 deg extension   110 deg abduction painful   60 deg ER   70 deg IR  - strength: 5/5  strength, 5/5 in all shoulder planes  - special tests:  (-) Speed's  (-) Neer  (+) Hawkin's  (+) Alissa pain  (-) [place]'s  Neuro  - no sensory or motor deficit, grossly normal coordination, normal muscle tone  Skin  - no ecchymosis, erythema, warmth, or induration, no obvious rash  Psych  - interactive, appropriate, normal mood and affect    IMAGING : XR cervical 2 views, Right shoulder 4 views. Final results and radiologist's interpretation, available in the [facility] health record. Images were reviewed with the patient/family members in the office today. My personal interpretation of the performed imaging is mild cervical disc degeneration greatest at C5-C6.  Shoulder without acute osseous abnormality or significant DJD.     ASSESSMENT & PLAN  Ms. Hendricks is a 64 year old year old female past medical history of stage Mark extranodal marginal zone lymphoma, severe peripheral vascular disease status post right AKA, stage III CKD who comes presents to clinic today with right shoulder and upper extremity pain.    Evaluation of both cervical spine as well as right shoulder consistent with rotator cuff tendinopathy of right shoulder.  I suspect that her increased reliance on shoulders for transferring and use of her wheelchair is led to rotator cuff tendinitis/tendinopathy.  Cannot rule out possibility of partial-thickness tear however her strength overall intact I suggest unlikely larger full thickness tear.    Diagnosis: Chronic pain of right shoulder    Treatment options reviewed and she is already embarked on some physical therapy using 2 pound weights for rotator cuff strengthening.  I would like her to continue on with the strengthening program.  Additionally we discussed options such as use of ongoing Tylenol, unable to utilize OTC NSAIDs due to CKD.  We also discussed  consideration for subacromial injection to augment pain relief and anti-inflammation.  She would like to pursue this today, see procedure note below.    I encouraged her to schedule her physical therapy visits to start training for her right lower extremity prosthesis.  I anticipate when she is able to ambulate more freely and decrease reliance on her shoulder, impingement and cuff strain should subside.    Follow up 6 weeks PRN.    Right shoulder Injection - subacromial space   The patient was informed of the risks and the benefits of the procedure and a written consent was signed.  The patient s right shoulder was prepped with chlorhexidine in sterile fashion.   40 mg of triamcinolone acetate suspension was drawn up into a 5 mL syringe with 4 mL of 1% lidocaine.  Injection was performed with sub-sterile technique.  A 1.5-inch 22-gauge needle was used to enter the subacromial space at the posterior aspect of shoulder.  There were no complications. The patient tolerated the procedure well. There was negligible bleeding.   The patient was instructed to ice the shoulder upon leaving clinic and refrain from overuse over the next 3 days.   The patient was instructed to call or go to the emergency room with any unusual pain, swelling, redness, or if otherwise concerned.    DO MAXWELL Walters  Primary Care Sports Medicine  [facility]      It was a pleasure seeing Shirley today.    Jacobo Velazquez DO, CAQSM    Primary Care Sports Medicine  Department of Orthopedic Surgery  [facility]      Large Joint Injection/Arthocentesis: R subacromial bursa    Date/Time: 5/8/2025 11:09 AM    Performed by: Jacobo Velazquez DO  Authorized by: Jacobo Velazquez DO    Indications:  Pain  Needle Size:  25 G  Guidance: landmark guided    Approach:  Lateral  Location:  Shoulder      Site:  R subacromial bursa  Medications:  40 mg triamcinolone 40 MG/ML; 4 mL lidocaine 1 %  Outcome:   Tolerated well, no immediate complications  Procedure discussed: discussed risks, benefits, and alternatives    Consent Given by:  Patient  Timeout: timeout called immediately prior to procedure    Prep: patient was prepped and draped in usual sterile fashion     Ultrasound was used to ensure safe and accurate needle placement and injection. Ultrasound images of the procedure were permanently stored.        Again, thank you for allowing me to participate in the care of your patient.        Sincerely,        Jacobo Velazquez, DO    Electronically signed

## 2025-05-08 NOTE — PROGRESS NOTES
CHIEF COMPLAINT:  Pain of the Right Shoulder     HISTORY OF PRESENT ILLNESS  Ms. Hendricks is a pleasant 64 year old year old female past medical history of stage Mark extranodal marginal zone lymphoma, severe peripheral vascular disease status post right AKA April 2024, stage III CKD who presents to clinic today with right shoulder pain.  Shirley explains that she has occasional events of pain/numbness which is associated with the anterior aspect of her shoulder.  She notes pain and paresthesias can travel to posterior right shoulder and down past elbow to hand rarely.  She notes difficulty with transfers from wheel chair to her chairs, bed when lifting herself up.  She has a prosthesis after her AKA but is still waiting on scheduling her PT to start process of walking with her AKA.      Onset: gradual  Location: right shoulder, anterior  Quality:  stabbing and sharp  Duration: 6 months   Severity: 4/10 at worst  Timing: intermittent episodes with transferring, direct pressure, abduction  Modifying factors:  resting and non-use makes it better, movement and use makes it worse  Associated signs & symptoms: pain, tenderness, and numbness  Previous similar pain: No  Treatments to date: Tylenol    Additional history: as documented    Review of Systems:  A 10-point review of systems was obtained and is negative except for as noted in the HPI.       MEDICAL HISTORY  Patient Active Problem List   Diagnosis    Reflux esophagitis    Osteoporosis    Cervicalgia    Hyperlipidemia LDL goal <70    PAD (peripheral artery disease)    Essential hypertension    Coronary artery disease involving native coronary artery of native heart without angina pectoris    Primary osteoarthritis involving multiple joints    DDD (degenerative disc disease), lumbar    S/P carotid endarterectomy    Chronic allergic rhinitis due to animal hair and dander    Tobacco use disorder    Anxiety    Vitamin C deficiency    History of colonic polyps     Bilateral carpal tunnel syndrome    Charcot-Breonna-Tooth disease type 1A    Atherosclerosis of bypass graft of right lower extremity with other clinical manifestation    Pseudoaneurysm of femoral artery following procedure    NSTEMI (non-ST elevated myocardial infarction) (H)    Acute reaction to situational stress    Acute on chronic anemia    Lymphoma of lymph nodes of neck, unspecified lymphoma type (H)    Arterial occlusion    S/P AKA (above knee amputation) unilateral, right (H)    Hypoxia    Pleural effusion    Shortness of breath    Hypertensive renal disease    Marginal zone lymphoma (H)    Multiple pulmonary nodules    Rash    Polyp of colon    Stage 3b chronic kidney disease (H)    Celiac disease    Benign neoplasm of ascending colon    Acquired absence of right leg above knee (H)    Adverse effect of iron and its compounds, subsequent encounter    Chronic atrial fibrillation (H)    Stage 3a chronic kidney disease (H)    Vitamin D deficiency    Iron deficiency    Hx of AKA (above knee amputation), right (H)    Right shoulder pain, unspecified chronicity       Current Outpatient Medications   Medication Sig Dispense Refill    acetaminophen (TYLENOL) 500 MG tablet Take 1-2 tablets (500-1,000 mg) by mouth every 6 hours as needed for mild pain 60 tablet 0    albuterol (PROAIR HFA/PROVENTIL HFA/VENTOLIN HFA) 108 (90 Base) MCG/ACT inhaler Inhale 2 puffs into the lungs every 4 hours as needed for shortness of breath, wheezing or cough. 18 g 0    amLODIPine (NORVASC) 10 MG tablet TAKE ONE TABLET BY MOUTH ONE TIME DAILY 90 tablet 3    apixaban ANTICOAGULANT (ELIQUIS) 2.5 MG tablet Take 1 tablet (2.5 mg) by mouth 2 times daily. 180 tablet 3    augmented betamethasone dipropionate (DIPROLENE AF) 0.05 % external cream Betamethasone Augmented Topical Cream 0.05 %        active      budesonide-formoterol (SYMBICORT) 160-4.5 MCG/ACT Inhaler Inhale 2 puffs into the lungs two times daily Disp 3 inhalers 30.6 g 3     Calcium-Vitamin D-Vitamin K (CALCIUM + D PO) Calcium-Cholecalciferol Oral 600 mg-10 mcg (400 unit)        active      carvedilol (COREG) 12.5 MG tablet Take 1 tablet (12.5 mg) by mouth 2 times daily (with meals). 180 tablet 3    cephALEXin (KEFLEX) 500 MG capsule Cephalexin Oral     take one twice daily   active      clopidogrel (PLAVIX) 75 MG tablet Take 1 tablet (75 mg) by mouth daily. 90 tablet 3    denosumab (PROLIA) 60 mg/mL injection Denosumab Subcutaneous (60 mg/mL - Prolia)        active      diphenhydrAMINE HCl (BENADRYL ALLERGY PO) Diphenhydramine Oral        active      FERROUS SULFATE CR PO Ferrous Sulfate Oral     take every other day   active      fluticasone (ARNUITY ELLIPTA) 200 MCG/ACT inhaler Fluticasone-Vilanterol Inhaler 200 mcg-25 mcg/dose        active      gabapentin (NEURONTIN) 100 MG capsule TAKE TWO CAPSULES BY MOUTH DAILY AT BEDTIME 180 capsule 1    hydrALAZINE (APRESOLINE) 25 MG tablet Take 1 tablet (25 mg) by mouth 3 times daily. 90 tablet 11    ipratropium - albuterol 0.5 mg/2.5 mg/3 mL (DUONEB) 0.5-2.5 (3) MG/3ML neb solution Take 1 vial (3 mLs) by nebulization every 6 hours as needed for shortness of breath, wheezing or cough. 360 mL 0    isosorbide mononitrate CR (IMDUR) 120 MG 24 HR ER tablet Take 1 tablet (120 mg) by mouth daily. 90 tablet 3    lisinopril (ZESTRIL) 5 MG tablet Lisinopril Oral     daily   active      multivitamin RENAL (TRIPHROCAPS) 1 capsule capsule Take 1 tablet by mouth.      nitroGLYcerin (NITROSTAT) 0.4 MG sublingual tablet Place 1 tablet (0.4 mg) under the tongue See Admin Instructions for chest pain 30 tablet 11    OMEPRAZOLE PO Omeprazole Oral Delayed Release Capsule     daily   active      rosuvastatin (CRESTOR) 10 MG tablet Take 1 tablet (10 mg) by mouth at bedtime. 90 tablet 0    senna (SENOKOT) 8.6 MG tablet Sennosides Oral     once daily    active      torsemide (DEMADEX) 20 MG tablet Take 1 tablet (20 mg) by mouth 2 times daily. 60 tablet 3     triamcinolone (KENALOG) 0.5 % external ointment Triamcinolone Topical Cream 0.1 %        active      vitamin D3 (CHOLECALCIFEROL) 50 mcg (2000 units) tablet Take 1 tablet (50 mcg) by mouth daily.         Allergies   Allergen Reactions    Contrast Dye Anaphylaxis     Pt reported facial and throat swelling with prior CT contrast    Gluten Meal Diarrhea and GI Disturbance     Hx of celiac     Pantoprazole Swelling    Penicillin G Itching and Rash    Penicillins Itching and Rash    Varenicline      Nightmares       Family History   Problem Relation Age of Onset    Cancer Mother         bladder    Cardiovascular Father         alive,multiple heart attacks    Diabetes Maternal Grandmother     Lung Cancer Maternal Grandmother     Blood Disease Brother         clotting disorder       Additional medical/Social/Surgical histories reviewed in [facility] and updated as appropriate.       PHYSICAL EXAM  LMP  (LMP Unknown)     General  - normal appearance, in no obvious distress  HEENT  - conjunctivae not injected, moist mucous membranes  CV  - normal radial pulse  Pulm  - normal respiratory pattern, non-labored  Musculoskeletal - cervical spine  - inspection: normal bone and joint alignment, no obvious kyphosis  - palpation: no paravertebral or bony tenderness  - ROM: normal and painless flexion, extension, left and right sidebending and rotation  - strength: upper extremities 5/5 in all planes  - special tests:  (-) Spurling bilaterally  Musculoskeletal - Right shoulder  - inspection: normal bone and joint alignment, no obvious deformity, no scapular winging, no AC step-off  - palpation:Tender infraspinatus and greater tuberosity.  - ROM:  160 deg flexion   45 deg extension   110 deg abduction painful   60 deg ER   70 deg IR  - strength: 5/5  strength, 5/5 in all shoulder planes  - special tests:  (-) Speed's  (-) Neer  (+) Hawkin's  (+) Alissa pain  (-) Kittitas's  Neuro  - no sensory or motor deficit, grossly normal coordination,  normal muscle tone  Skin  - no ecchymosis, erythema, warmth, or induration, no obvious rash  Psych  - interactive, appropriate, normal mood and affect    IMAGING : XR cervical 2 views, Right shoulder 4 views. Final results and radiologist's interpretation, available in the [facility] health record. Images were reviewed with the patient/family members in the office today. My personal interpretation of the performed imaging is mild cervical disc degeneration greatest at C5-C6.  Shoulder without acute osseous abnormality or significant DJD.     ASSESSMENT & PLAN  Ms. Hendricks is a 64 year old year old female past medical history of stage Mark extranodal marginal zone lymphoma, severe peripheral vascular disease status post right AKA, stage III CKD who comes presents to clinic today with right shoulder and upper extremity pain.    Evaluation of both cervical spine as well as right shoulder consistent with rotator cuff tendinopathy of right shoulder.  I suspect that her increased reliance on shoulders for transferring and use of her wheelchair is led to rotator cuff tendinitis/tendinopathy.  Cannot rule out possibility of partial-thickness tear however her strength overall intact I suggest unlikely larger full thickness tear.    Diagnosis: Chronic pain of right shoulder    Treatment options reviewed and she is already embarked on some physical therapy using 2 pound weights for rotator cuff strengthening.  I would like her to continue on with the strengthening program.  Additionally we discussed options such as use of ongoing Tylenol, unable to utilize OTC NSAIDs due to CKD.  We also discussed consideration for subacromial injection to augment pain relief and anti-inflammation.  She would like to pursue this today, see procedure note below.    I encouraged her to schedule her physical therapy visits to start training for her right lower extremity prosthesis.  I anticipate when she is able to ambulate more freely and decrease  reliance on her shoulder, impingement and cuff strain should subside.    Follow up 6 weeks PRN.    Right shoulder Injection - subacromial space   The patient was informed of the risks and the benefits of the procedure and a written consent was signed.  The patient s right shoulder was prepped with chlorhexidine in sterile fashion.   40 mg of triamcinolone acetate suspension was drawn up into a 5 mL syringe with 4 mL of 1% lidocaine.  Injection was performed with sub-sterile technique.  A 1.5-inch 22-gauge needle was used to enter the subacromial space at the posterior aspect of shoulder.  There were no complications. The patient tolerated the procedure well. There was negligible bleeding.   The patient was instructed to ice the shoulder upon leaving clinic and refrain from overuse over the next 3 days.   The patient was instructed to call or go to the emergency room with any unusual pain, swelling, redness, or if otherwise concerned.    DO MAXWELL Watlers  Primary Care Sports Medicine  [facility]      It was a pleasure seeing Shirley today.    Jacobo Velazquez DO, CAQSM    Primary Care Sports Medicine  Department of Orthopedic Surgery  [facility]      Large Joint Injection/Arthocentesis: R subacromial bursa    Date/Time: 5/8/2025 11:09 AM    Performed by: Jacobo Velazquez DO  Authorized by: Jacobo Velazquez DO    Indications:  Pain  Needle Size:  25 G  Guidance: landmark guided    Approach:  Lateral  Location:  Shoulder      Site:  R subacromial bursa  Medications:  40 mg triamcinolone 40 MG/ML; 4 mL lidocaine 1 %  Outcome:  Tolerated well, no immediate complications  Procedure discussed: discussed risks, benefits, and alternatives    Consent Given by:  Patient  Timeout: timeout called immediately prior to procedure    Prep: patient was prepped and draped in usual sterile fashion     Ultrasound was used to ensure safe and accurate needle placement and injection.  Ultrasound images of the procedure were permanently stored.

## 2025-05-22 PROBLEM — Z53.9 DIAGNOSIS NOT YET DEFINED: Status: ACTIVE | Noted: 2025-05-22

## 2025-05-29 NOTE — PROGRESS NOTES
Cardiology Virtual Visit Progress Note    Service Date: May 29, 2025  Primary Cardiologist: Dr. Huang  Reason for visit: 6-month follow up     Ms. Shirley Hendricks is a 64 year old female who is being evaluated via a billable VIDEO visit.    Patient has given verbal consent for virtual visit?  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 Hendricks for cardiology follow-up today. The patient has a complex peripheral and cardiovascular history as outlined above.      She was hospitalized at [facility] on 7/30/2024-8/7/2024 for acute hypoxic respiratory failure due to acute on chronic HFpEF. She was also found to have recurrent pleural effusion requiring thoracentesis.      She was then admitted again on 11/20/24 with acute hypoxic respiratory failure, multifactorial with elements of acute decompensated heart failure, pleural effusions and underlying COPD at play.  She has tenuous renal status thus making aggressive diuresis challenging. She was admitted at a weight just shy of 110#.  At this admission, she was Dx with new AF/RVR --> atenolol changed to metoprolol for CKD and better HR control.  She was started on NOAC.  She had recurrent pleural effusions that were unable to be managed with repeat thoracentesis, so ultimately she received Pleurx catheter and eventually discharged home.    I saw this patient in December 2024 and her BP remained elevated but was improved from her hospital trend.  She still had her pleurx drain  "in place - is draining nearly 1000cc approximately every 3-days.  She is on chronic O2.  In a wheelchair 2/2 amputation.  No CP, SOB, palpitations, edema.  Physical exam reveals normal heart rhythm today, lungs clear.  Home BP trend averaging systolic 130-160 without the need for PRN Hydralazine.    Interval 06/03/25    She is fortunately draining less fluid from her pleur-x drain at this time.  She drains approximately 500-800 ml approximately every 7-10 days.  She monitors her O2 levels very carefully and knows exactly when she is building up fluid.  She subjectively feels that her weight is stable and her appetite is very healthy.  She perhaps has gained some food weight, but does not feel like she has any extra fluid on board.  She estimates her weight around 110-112#.  Her blood pressure is looking excellent, systolic average 130#.  She only checks it when she feels \"something is off.\"    __________________________________________________________________         Assessment and Impression:     Coronary artery disease  Known  D1 with left to left collaterals from dLAD-D1. Moderate CAD of prox to mid RCA- negative by iFR, moderate CAD in distal small-caliber rPL and distal circumflex  On statin, clopidogrel.  Heart failure with preserved ejection fraction  LVEF: 60%  [facility] 11/27/24: Normal right and left sided pressures at weight 47.5 kg, no e/o PH, normal CO.  Ischemic evaluation: Coronary angiogram 10/4/2023 with occluded D1 with left to left collaterals. Mild to moderate non-obstructive CAD elsewhere.  Guideline directed medical therapy:  Beta blocker: Carvedilol 12.5 mg BID  Aldactone antagonist: stopped 2/2 CKD  SGLT2 inhibitor: stopped 2/2 CKD  On Imdur 120 mg daily + hydralazine PRN 50 mg TID.  Counseled patient on fluid and sodium restriction, monitoring weight  Paroxysmal atrial fibrillation, on Eliquis and Carvedilol.  Severe peripheral arterial disease, S/p right BKA 4/1/2024  Right carotid " endarterectomy 2016  Left carotid endarterectomy 2020  Resistant Hypertension  Chronic kidney disease stage IIIb, follows with Nephrology  Hyperlipidemia, on Rosuvastatin 10 mg  COPD  Malignant B-cell lymphoma          Recommendations and Plan:     No changes today.  BP looks excellent and she is wonderfully compliant with her cardiac regimen.  Weight is stable and she overall feels well.  She verbalizes that she wants no adjustments or medication changes today.  __________________________________________________________________    Thank you for the opportunity to participate in this pleasant patient's care.   We would be happy to see her sooner if needed for any concerns in the meantime.     I spent 27 minutes on the date of encouter of which 16 minutes were spent in medical discussion    NOELLE Little, CNP   Nurse Practitioner  [facility]  Pager: [phone]   Text Page (8am - 5pm, M-F)    Provider location: onsite  Patient location: Home  Total time on VIDEO call: 16 minutes    Today's clinic visit entailed:  The following tests were independently interpreted by me as noted in my documentation: labs, cath, echo  Prescription drug management  The level of medical decision making during this visit was of moderate complexity.  Review of the result(s) of each unique test - cardiac testing, cardiac imaging, labs  Care everywhere reviewed for additional records to facilitate comprehensive patient care.  Recent hospitalization records and notes reviewed to facilitate comprehensive care coordination.    CURRENT MEDICATIONS:  Current Outpatient Medications   Medication Sig Dispense Refill    acetaminophen (TYLENOL) 500 MG tablet Take 1-2 tablets (500-1,000 mg) by mouth every 6 hours as needed for mild pain 60 tablet 0    albuterol (PROAIR HFA/PROVENTIL HFA/VENTOLIN HFA) 108 (90 Base) MCG/ACT inhaler Inhale 2 puffs into the lungs every 4 hours as needed for shortness of breath, wheezing or  cough. 18 g 0    amLODIPine (NORVASC) 10 MG tablet TAKE ONE TABLET BY MOUTH ONE TIME DAILY 90 tablet 3    apixaban ANTICOAGULANT (ELIQUIS) 2.5 MG tablet Take 1 tablet (2.5 mg) by mouth 2 times daily. 180 tablet 3    budesonide-formoterol (SYMBICORT) 160-4.5 MCG/ACT Inhaler Inhale 2 puffs into the lungs two times daily Disp 3 inhalers 30.6 g 3    carvedilol (COREG) 12.5 MG tablet Take 1 tablet (12.5 mg) by mouth 2 times daily (with meals). 180 tablet 3    clopidogrel (PLAVIX) 75 MG tablet Take 1 tablet (75 mg) by mouth daily. 90 tablet 3    diphenhydrAMINE HCl (BENADRYL ALLERGY PO) Diphenhydramine Oral        active      FERROUS SULFATE CR PO Ferrous Sulfate Oral     take every other day   active      gabapentin (NEURONTIN) 100 MG capsule TAKE TWO CAPSULES BY MOUTH DAILY AT BEDTIME 180 capsule 1    hydrALAZINE (APRESOLINE) 25 MG tablet Take 1 tablet (25 mg) by mouth 3 times daily. 90 tablet 11    ipratropium - albuterol 0.5 mg/2.5 mg/3 mL (DUONEB) 0.5-2.5 (3) MG/3ML neb solution Take 1 vial (3 mLs) by nebulization every 6 hours as needed for shortness of breath, wheezing or cough. 360 mL 0    isosorbide mononitrate CR (IMDUR) 120 MG 24 HR ER tablet Take 1 tablet (120 mg) by mouth daily. 90 tablet 3    lisinopril (ZESTRIL) 5 MG tablet Take 5 mg by mouth daily.      multivitamin RENAL (TRIPHROCAPS) 1 capsule capsule Take 1 tablet by mouth.      nitroGLYcerin (NITROSTAT) 0.4 MG sublingual tablet Place 1 tablet (0.4 mg) under the tongue See Admin Instructions for chest pain 30 tablet 11    rosuvastatin (CRESTOR) 10 MG tablet Take 1 tablet (10 mg) by mouth at bedtime. 90 tablet 0    Vitamin D3 50 mcg (2000 units) tablet Take 1 tablet by mouth daily.      augmented betamethasone dipropionate (DIPROLENE AF) 0.05 % external cream Betamethasone Augmented Topical Cream 0.05 %        active (Patient not taking: Reported on 6/3/2025)      fluticasone (ARNUITY ELLIPTA) 200 MCG/ACT inhaler Fluticasone-Vilanterol Inhaler 200 mcg-25  mcg/dose        active (Patient not taking: Reported on 6/3/2025)      OMEPRAZOLE PO Omeprazole Oral Delayed Release Capsule     daily   active (Patient not taking: Reported on 6/3/2025)      senna (SENOKOT) 8.6 MG tablet Sennosides Oral     once daily    active (Patient not taking: Reported on 6/3/2025)      torsemide (DEMADEX) 20 MG tablet Take 1 tablet (20 mg) by mouth 2 times daily. 60 tablet 3    triamcinolone (KENALOG) 0.5 % external ointment Triamcinolone Topical Cream 0.1 %        active (Patient not taking: Reported on 6/3/2025)         ALLERGIES  Allergies   Allergen Reactions    Contrast Dye Anaphylaxis     Pt reported facial and throat swelling with prior CT contrast    Gluten Meal Diarrhea and GI Disturbance     Hx of celiac     Pantoprazole Swelling    Penicillin G Itching and Rash    Penicillins Itching and Rash    Varenicline      Nightmares       PAST MEDICAL, SURGICAL, FAMILY HISTORY:  History was reviewed and updated as needed, see medical record.    SOCIAL HISTORY:  Social History     Socioeconomic History    Marital status:      Spouse name: Not on file    Number of children: Not on file    Years of education: Not on file    Highest education level: Not on file   Occupational History    Not on file   Tobacco Use    Smoking status: Former     Current packs/day: 0.25     Average packs/day: 0.3 packs/day for 57.4 years (14.4 ttl pk-yrs)     Types: Cigarettes     Start date: 1968    Smokeless tobacco: Current    Tobacco comments:     1/2 PPD. 1-3 cigs a day     3/4/25 Pt states she will take drags of [employer].-- Vape very occ 5mg nicotine    Vaping Use    Vaping status: Never Used   Substance and Sexual Activity    Alcohol use: Yes     Comment: x1-2 yr rare    Drug use: Yes     Types: Marijuana     Comment: 2x per day    Sexual activity: Yes     Partners: Male     Birth control/protection: Surgical   Other Topics Concern    Parent/sibling w/ CABG, MI or angioplasty before 65F 55M? Not Asked    Social History Narrative    Not on file     Social Drivers of Health     Financial Resource Strain: Low Risk  (11/21/2024)    Financial Resource Strain     Within the past 12 months, have you or your family members you live with been unable to get utilities (heat, electricity) when it was really needed?: No   Food Insecurity: Low Risk  (11/21/2024)    Food Insecurity     Within the past 12 months, did you worry that your food would run out before you got money to buy more?: No     Within the past 12 months, did the food you bought just not last and you didn t have money to get more?: No   Transportation Needs: Low Risk  (11/21/2024)    Transportation Needs     Within the past 12 months, has lack of transportation kept you from medical appointments, getting your medicines, non-medical meetings or appointments, work, or from getting things that you need?: No   Physical Activity: Not on file   Stress: Not on file   Social Connections: Not on file   Interpersonal Safety: Low Risk  (11/21/2024)    Interpersonal Safety     Do you feel physically and emotionally safe where you currently live?: Yes     Within the past 12 months, have you been hit, slapped, kicked or otherwise physically hurt by someone?: No     Within the past 12 months, have you been humiliated or emotionally abused in other ways by your partner or ex-partner?: No   Housing Stability: Low Risk  (11/21/2024)    Housing Stability     Do you have housing? : Yes     Are you worried about losing your housing?: No     Review of Systems:  Skin: negative  Eyes: negative  Ears/Nose/Throat: negative  Respiratory: No shortness of breath.  Cardiovascular: negative  Gastrointestinal: negative  Genitourinary: negative  Musculoskeletal: negative.  Hx amputation.  Neurologic: negative  Hematologic/Lymphatic/Immunologic: negative  Endocrine: negative  PSYCH: Alert and oriented times 3; coherent speech  RESP: No cough, no audible wheezing, able to talk in full  sentences    Remainder of the comprehensive physical exam is unable to be completed due to today's visit being completed via telephone call.    LMP  (LMP Unknown)    Wt Readings from Last 4 Encounters:   12/20/24 49.4 kg (109 lb)   12/13/24 49.8 kg (109 lb 12.6 oz)   09/23/24 44.8 kg (98 lb 12.8 oz)   08/15/24 47.2 kg (104 lb)     Recent Lab Results:  LIPID RESULTS:  Lab Results   Component Value Date    CHOL 137 10/03/2023    CHOL 143 07/07/2021    HDL 54 10/03/2023    HDL 53 07/07/2021    LDL 69 10/03/2023    LDL 76 07/07/2021    TRIG 68 10/03/2023    TRIG 71 07/07/2021    CHOLHDLRATIO 2.6 03/15/2013     LIVER ENZYME RESULTS:  Lab Results   Component Value Date    AST 16 11/21/2024    AST 17 01/05/2021    ALT 15 11/21/2024    ALT 25 01/05/2021     CBC RESULTS:  Lab Results   Component Value Date    WBC 5.7 03/10/2025    WBC 5.3 07/09/2021    RBC 4.94 03/10/2025    RBC 2.88 (L) 07/09/2021    HGB 12.8 03/10/2025    HGB 8.8 (L) 07/09/2021    HCT 40.3 03/10/2025    HCT 27.3 (L) 07/09/2021    MCV 82 03/10/2025    MCV 95 07/09/2021    MCH 25.9 (L) 03/10/2025    MCH 30.6 07/09/2021    MCHC 31.8 03/10/2025    MCHC 32.2 07/09/2021    RDW 17.1 (H) 03/10/2025    RDW 12.5 07/09/2021     03/10/2025     07/09/2021     BMP RESULTS:  Lab Results   Component Value Date     03/10/2025     (L) 07/09/2021    POTASSIUM 4.5 03/10/2025    POTASSIUM 4.3 12/29/2022    POTASSIUM 5.2 07/09/2021    CHLORIDE 99 03/10/2025    CHLORIDE 98 12/29/2022    CHLORIDE 103 07/09/2021    CO2 28 03/10/2025    CO2 24 12/29/2022    CO2 27 07/09/2021    ANIONGAP 9 03/10/2025    ANIONGAP 8 12/29/2022    ANIONGAP 2 (L) 07/09/2021    GLC 73 03/10/2025    GLC 89 12/06/2024    GLC 93 12/29/2022    GLC 98 07/09/2021    BUN 35.2 (H) 03/10/2025    BUN 17 12/29/2022    BUN 13 07/09/2021    CR 1.10 (H) 03/10/2025    CR 0.77 07/09/2021    GFRESTIMATED 55 (L) 03/10/2025    GFRESTIMATED >60 10/03/2023    GFRESTIMATED 83 07/09/2021     GFRESTBLACK >90 07/09/2021    SONIA 8.5 (L) 03/10/2025    SONIA 8.6 07/09/2021      A1C RESULTS:  Lab Results   Component Value Date    A1C 4.6 06/21/2024    A1C 5.4 09/23/2020     INR RESULTS:  Lab Results   Component Value Date    INR 1.24 (H) 12/04/2024    INR 1.31 (H) 04/11/2024    INR 1.01 09/24/2020    INR 0.95 05/10/2016     This note was completed in part using Dragon voice recognition software. Although reviewed after completion, some word and grammatical errors may occur.

## 2025-06-03 ENCOUNTER — VIRTUAL VISIT (OUTPATIENT)
Dept: CARDIOLOGY | Facility: CLINIC | Age: 65
End: 2025-06-03
Payer: COMMERCIAL

## 2025-06-03 DIAGNOSIS — J44.9 CHRONIC OBSTRUCTIVE PULMONARY DISEASE, UNSPECIFIED COPD TYPE (H): ICD-10-CM

## 2025-06-03 DIAGNOSIS — I50.30 HEART FAILURE WITH PRESERVED EJECTION FRACTION, NYHA CLASS II (H): Primary | ICD-10-CM

## 2025-06-03 DIAGNOSIS — I50.9 ACUTE ON CHRONIC HEART FAILURE, UNSPECIFIED HEART FAILURE TYPE (H): ICD-10-CM

## 2025-06-03 DIAGNOSIS — E11.51 TYPE 2 DIABETES MELLITUS WITH DIABETIC PERIPHERAL ANGIOPATHY WITHOUT GANGRENE, WITHOUT LONG-TERM CURRENT USE OF INSULIN (H): ICD-10-CM

## 2025-06-03 DIAGNOSIS — I10 ESSENTIAL HYPERTENSION: ICD-10-CM

## 2025-06-03 RX ORDER — CHOLECALCIFEROL (VITAMIN D3) 50 MCG
1 TABLET ORAL DAILY
COMMUNITY

## 2025-06-03 NOTE — LETTER
6/3/2025    Vasquez Benoit MD  [address] [facility] [address]    RE: Shirley Hendricks       Dear Colleague,     I had the pleasure of seeing Shirley Hendricks in the [facility].      Cardiology Virtual Visit Progress Note    Service Date: May 29, 2025  Primary Cardiologist: Dr. Huang  Reason for visit: 6-month follow up     Ms. Shirley Hendricks is a 64 year old female who is being evaluated via a billable VIDEO visit.    Patient has given verbal consent for virtual visit?  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 Hendricks for cardiology follow-up today. The patient has a complex peripheral and cardiovascular history as outlined above.      She was hospitalized at [facility] on 7/30/2024-8/7/2024 for acute hypoxic respiratory failure due to acute on chronic HFpEF. She was also found to have recurrent pleural effusion requiring thoracentesis.      She was then admitted again on 11/20/24 with acute hypoxic respiratory failure, multifactorial with elements of acute decompensated heart failure, pleural effusions and underlying COPD at play.  She has tenuous renal status thus making aggressive diuresis challenging. She was admitted at a weight just shy of 110#.  At this admission, she was Dx with new AF/RVR --> atenolol changed to metoprolol for CKD and better HR control.  She was started on NOAC.  She had recurrent pleural effusions that were unable to be managed with repeat thoracentesis, so  "ultimately she received Pleurx catheter and eventually discharged home.    I saw this patient in December 2024 and her BP remained elevated but was improved from her hospital trend.  She still had her pleurx drain in place - is draining nearly 1000cc approximately every 3-days.  She is on chronic O2.  In a wheelchair 2/2 amputation.  No CP, SOB, palpitations, edema.  Physical exam reveals normal heart rhythm today, lungs clear.  Home BP trend averaging systolic 130-160 without the need for PRN Hydralazine.    Interval 06/03/25    She is fortunately draining less fluid from her pleur-x drain at this time.  She drains approximately 500-800 ml approximately every 7-10 days.  She monitors her O2 levels very carefully and knows exactly when she is building up fluid.  She subjectively feels that her weight is stable and her appetite is very healthy.  She perhaps has gained some food weight, but does not feel like she has any extra fluid on board.  She estimates her weight around 110-112#.  Her blood pressure is looking excellent, systolic average 130#.  She only checks it when she feels \"something is off.\"    __________________________________________________________________         Assessment and Impression:     Coronary artery disease  Known  D1 with left to left collaterals from dLAD-D1. Moderate CAD of prox to mid RCA- negative by iFR, moderate CAD in distal small-caliber rPL and distal circumflex  On statin, clopidogrel.  Heart failure with preserved ejection fraction  LVEF: 60%  [facility] 11/27/24: Normal right and left sided pressures at weight 47.5 kg, no e/o PH, normal CO.  Ischemic evaluation: Coronary angiogram 10/4/2023 with occluded D1 with left to left collaterals. Mild to moderate non-obstructive CAD elsewhere.  Guideline directed medical therapy:  Beta blocker: Carvedilol 12.5 mg BID  Aldactone antagonist: stopped 2/2 CKD  SGLT2 inhibitor: stopped 2/2 CKD  On Imdur 120 mg daily + hydralazine PRN 50 mg " TID.  Counseled patient on fluid and sodium restriction, monitoring weight  Paroxysmal atrial fibrillation, on Eliquis and Carvedilol.  Severe peripheral arterial disease, S/p right BKA 4/1/2024  Right carotid endarterectomy 2016  Left carotid endarterectomy 2020  Resistant Hypertension  Chronic kidney disease stage IIIb, follows with Nephrology  Hyperlipidemia, on Rosuvastatin 10 mg  COPD  Malignant B-cell lymphoma          Recommendations and Plan:     No changes today.  BP looks excellent and she is wonderfully compliant with her cardiac regimen.  Weight is stable and she overall feels well.  She verbalizes that she wants no adjustments or medication changes today.  __________________________________________________________________    Thank you for the opportunity to participate in this pleasant patient's care.   We would be happy to see her sooner if needed for any concerns in the meantime.     I spent 27 minutes on the date of encouter of which 16 minutes were spent in medical discussion    NOELLE Little, CNP   Nurse Practitioner  [facility]  Pager: [phone]   Text Page (8am - 5pm, M-F)    Provider location: onsite  Patient location: Home  Total time on VIDEO call: 16 minutes    Today's clinic visit entailed:  The following tests were independently interpreted by me as noted in my documentation: labs, cath, echo  Prescription drug management  The level of medical decision making during this visit was of moderate complexity.  Review of the result(s) of each unique test - cardiac testing, cardiac imaging, labs  Care everywhere reviewed for additional records to facilitate comprehensive patient care.  Recent hospitalization records and notes reviewed to facilitate comprehensive care coordination.    CURRENT MEDICATIONS:  Current Outpatient Medications   Medication Sig Dispense Refill     acetaminophen (TYLENOL) 500 MG tablet Take 1-2 tablets (500-1,000 mg) by mouth every 6 hours  as needed for mild pain 60 tablet 0     albuterol (PROAIR HFA/PROVENTIL HFA/VENTOLIN HFA) 108 (90 Base) MCG/ACT inhaler Inhale 2 puffs into the lungs every 4 hours as needed for shortness of breath, wheezing or cough. 18 g 0     amLODIPine (NORVASC) 10 MG tablet TAKE ONE TABLET BY MOUTH ONE TIME DAILY 90 tablet 3     apixaban ANTICOAGULANT (ELIQUIS) 2.5 MG tablet Take 1 tablet (2.5 mg) by mouth 2 times daily. 180 tablet 3     budesonide-formoterol (SYMBICORT) 160-4.5 MCG/ACT Inhaler Inhale 2 puffs into the lungs two times daily Disp 3 inhalers 30.6 g 3     carvedilol (COREG) 12.5 MG tablet Take 1 tablet (12.5 mg) by mouth 2 times daily (with meals). 180 tablet 3     clopidogrel (PLAVIX) 75 MG tablet Take 1 tablet (75 mg) by mouth daily. 90 tablet 3     diphenhydrAMINE HCl (BENADRYL ALLERGY PO) Diphenhydramine Oral        active       FERROUS SULFATE CR PO Ferrous Sulfate Oral     take every other day   active       gabapentin (NEURONTIN) 100 MG capsule TAKE TWO CAPSULES BY MOUTH DAILY AT BEDTIME 180 capsule 1     hydrALAZINE (APRESOLINE) 25 MG tablet Take 1 tablet (25 mg) by mouth 3 times daily. 90 tablet 11     ipratropium - albuterol 0.5 mg/2.5 mg/3 mL (DUONEB) 0.5-2.5 (3) MG/3ML neb solution Take 1 vial (3 mLs) by nebulization every 6 hours as needed for shortness of breath, wheezing or cough. 360 mL 0     isosorbide mononitrate CR (IMDUR) 120 MG 24 HR ER tablet Take 1 tablet (120 mg) by mouth daily. 90 tablet 3     lisinopril (ZESTRIL) 5 MG tablet Take 5 mg by mouth daily.       multivitamin RENAL (TRIPHROCAPS) 1 capsule capsule Take 1 tablet by mouth.       nitroGLYcerin (NITROSTAT) 0.4 MG sublingual tablet Place 1 tablet (0.4 mg) under the tongue See Admin Instructions for chest pain 30 tablet 11     rosuvastatin (CRESTOR) 10 MG tablet Take 1 tablet (10 mg) by mouth at bedtime. 90 tablet 0     Vitamin D3 50 mcg (2000 units) tablet Take 1 tablet by mouth daily.       augmented betamethasone dipropionate  (DIPROLENE AF) 0.05 % external cream Betamethasone Augmented Topical Cream 0.05 %        active (Patient not taking: Reported on 6/3/2025)       fluticasone (ARNUITY ELLIPTA) 200 MCG/ACT inhaler Fluticasone-Vilanterol Inhaler 200 mcg-25 mcg/dose        active (Patient not taking: Reported on 6/3/2025)       OMEPRAZOLE PO Omeprazole Oral Delayed Release Capsule     daily   active (Patient not taking: Reported on 6/3/2025)       senna (SENOKOT) 8.6 MG tablet Sennosides Oral     once daily    active (Patient not taking: Reported on 6/3/2025)       torsemide (DEMADEX) 20 MG tablet Take 1 tablet (20 mg) by mouth 2 times daily. 60 tablet 3     triamcinolone (KENALOG) 0.5 % external ointment Triamcinolone Topical Cream 0.1 %        active (Patient not taking: Reported on 6/3/2025)         ALLERGIES  Allergies   Allergen Reactions     Contrast Dye Anaphylaxis     Pt reported facial and throat swelling with prior CT contrast     Gluten Meal Diarrhea and GI Disturbance     Hx of celiac      Pantoprazole Swelling     Penicillin G Itching and Rash     Penicillins Itching and Rash     Varenicline      Nightmares       PAST MEDICAL, SURGICAL, FAMILY HISTORY:  History was reviewed and updated as needed, see medical record.    SOCIAL HISTORY:  Social History     Socioeconomic History     Marital status:      Spouse name: Not on file     Number of children: Not on file     Years of education: Not on file     Highest education level: Not on file   Occupational History     Not on file   Tobacco Use     Smoking status: Former     Current packs/day: 0.25     Average packs/day: 0.3 packs/day for 57.4 years (14.4 ttl pk-yrs)     Types: Cigarettes     Start date: 1968     Smokeless tobacco: Current     Tobacco comments:     1/2 PPD. 1-3 cigs a day     3/4/25 Pt states she will take drags of [employer].-- Vape very occ 5mg nicotine    Vaping Use     Vaping status: Never Used   Substance and Sexual Activity     Alcohol use: Yes      Comment: x1-2 yr rare     Drug use: Yes     Types: Marijuana     Comment: 2x per day     Sexual activity: Yes     Partners: Male     Birth control/protection: Surgical   Other Topics Concern     Parent/sibling w/ CABG, MI or angioplasty before 65F 55M? Not Asked   Social History Narrative     Not on file     Social Drivers of Health     Financial Resource Strain: Low Risk  (11/21/2024)    Financial Resource Strain      Within the past 12 months, have you or your family members you live with been unable to get utilities (heat, electricity) when it was really needed?: No   Food Insecurity: Low Risk  (11/21/2024)    Food Insecurity      Within the past 12 months, did you worry that your food would run out before you got money to buy more?: No      Within the past 12 months, did the food you bought just not last and you didn t have money to get more?: No   Transportation Needs: Low Risk  (11/21/2024)    Transportation Needs      Within the past 12 months, has lack of transportation kept you from medical appointments, getting your medicines, non-medical meetings or appointments, work, or from getting things that you need?: No   Physical Activity: Not on file   Stress: Not on file   Social Connections: Not on file   Interpersonal Safety: Low Risk  (11/21/2024)    Interpersonal Safety      Do you feel physically and emotionally safe where you currently live?: Yes      Within the past 12 months, have you been hit, slapped, kicked or otherwise physically hurt by someone?: No      Within the past 12 months, have you been humiliated or emotionally abused in other ways by your partner or ex-partner?: No   Housing Stability: Low Risk  (11/21/2024)    Housing Stability      Do you have housing? : Yes      Are you worried about losing your housing?: No     Review of Systems:  Skin: negative  Eyes: negative  Ears/Nose/Throat: negative  Respiratory: No shortness of breath.  Cardiovascular: negative  Gastrointestinal:  negative  Genitourinary: negative  Musculoskeletal: negative.  Hx amputation.  Neurologic: negative  Hematologic/Lymphatic/Immunologic: negative  Endocrine: negative  PSYCH: Alert and oriented times 3; coherent speech  RESP: No cough, no audible wheezing, able to talk in full sentences    Remainder of the comprehensive physical exam is unable to be completed due to today's visit being completed via telephone call.    LMP  (LMP Unknown)    Wt Readings from Last 4 Encounters:   12/20/24 49.4 kg (109 lb)   12/13/24 49.8 kg (109 lb 12.6 oz)   09/23/24 44.8 kg (98 lb 12.8 oz)   08/15/24 47.2 kg (104 lb)     Recent Lab Results:  LIPID RESULTS:  Lab Results   Component Value Date    CHOL 137 10/03/2023    CHOL 143 07/07/2021    HDL 54 10/03/2023    HDL 53 07/07/2021    LDL 69 10/03/2023    LDL 76 07/07/2021    TRIG 68 10/03/2023    TRIG 71 07/07/2021    CHOLHDLRATIO 2.6 03/15/2013     LIVER ENZYME RESULTS:  Lab Results   Component Value Date    AST 16 11/21/2024    AST 17 01/05/2021    ALT 15 11/21/2024    ALT 25 01/05/2021     CBC RESULTS:  Lab Results   Component Value Date    WBC 5.7 03/10/2025    WBC 5.3 07/09/2021    RBC 4.94 03/10/2025    RBC 2.88 (L) 07/09/2021    HGB 12.8 03/10/2025    HGB 8.8 (L) 07/09/2021    HCT 40.3 03/10/2025    HCT 27.3 (L) 07/09/2021    MCV 82 03/10/2025    MCV 95 07/09/2021    MCH 25.9 (L) 03/10/2025    MCH 30.6 07/09/2021    MCHC 31.8 03/10/2025    MCHC 32.2 07/09/2021    RDW 17.1 (H) 03/10/2025    RDW 12.5 07/09/2021     03/10/2025     07/09/2021     BMP RESULTS:  Lab Results   Component Value Date     03/10/2025     (L) 07/09/2021    POTASSIUM 4.5 03/10/2025    POTASSIUM 4.3 12/29/2022    POTASSIUM 5.2 07/09/2021    CHLORIDE 99 03/10/2025    CHLORIDE 98 12/29/2022    CHLORIDE 103 07/09/2021    CO2 28 03/10/2025    CO2 24 12/29/2022    CO2 27 07/09/2021    ANIONGAP 9 03/10/2025    ANIONGAP 8 12/29/2022    ANIONGAP 2 (L) 07/09/2021    GLC 73 03/10/2025    GLC 89  12/06/2024    GLC 93 12/29/2022    GLC 98 07/09/2021    BUN 35.2 (H) 03/10/2025    BUN 17 12/29/2022    BUN 13 07/09/2021    CR 1.10 (H) 03/10/2025    CR 0.77 07/09/2021    GFRESTIMATED 55 (L) 03/10/2025    GFRESTIMATED >60 10/03/2023    GFRESTIMATED 83 07/09/2021    GFRESTBLACK >90 07/09/2021    SONIA 8.5 (L) 03/10/2025    SONIA 8.6 07/09/2021      A1C RESULTS:  Lab Results   Component Value Date    A1C 4.6 06/21/2024    A1C 5.4 09/23/2020     INR RESULTS:  Lab Results   Component Value Date    INR 1.24 (H) 12/04/2024    INR 1.31 (H) 04/11/2024    INR 1.01 09/24/2020    INR 0.95 05/10/2016     This note was completed in part using Dragon voice recognition software. Although reviewed after completion, some word and grammatical errors may occur.     Shirley is a 64 year old who is being evaluated via a billable video visit.    How would you like to obtain your AVS? MyChart  If the video visit is dropped, the invitation should be resent by: Text to cell phone: [phone]  Will anyone else be joining your video visit? No  {If patient encounters technical issues they should call [phone] :[id number]}  Video-Visit Details    Type of service:  Video Visit   Video End Time:{video visit start/end time for provider to select:[id number]}  Originating Location (pt. Location): Home  {PROVIDER LOCATION On-site should be selected for visits conducted from your clinic location or adjoining [facility] hospital, academic office, or other nearby [facility] building. Off-site should be selected for all other provider locations, including home:[id number]}  Distant Location (provider location):  On-site  Platform used for Video Visit: [employer]      Vitals - Patient Reported  Systolic (Patient Reported): 138  Diastolic (Patient Reported): 65  Pulse (Patient Reported): 72    Review Of Systems  Eyes:Ears/Nose/Throat: POSITIVE: reading glasses  Respiratory: POSITIVE:SOB only when water weight is in the lungs per pt  Cardiovascular:POSITIVE: :LH/dizzy only when going  from laying to standing in the AM,  palpitations/flutter occasional   Neurologic: POSITIVE: migraines/headaches with some visual changes   Hematologic/Lymphatic/Immunologic: POSITIVE: allergies   Endocrine:  NEGATIVE    Soumya Choudhary CMA      Thank you for allowing me to participate in the care of your patient.      Sincerely,     NOELLE [facility]  cc:   Sandra Garcia MD  [address] [place] [phone]

## 2025-06-03 NOTE — PROGRESS NOTES
Shirley is a 64 year old who is being evaluated via a billable video visit.    How would you like to obtain your AVS? MyChart  If the video visit is dropped, the invitation should be resent by: Text to cell phone: [phone]  Will anyone else be joining your video visit? No  {If patient encounters technical issues they should call [phone] :[id number]}  Video-Visit Details    Type of service:  Video Visit   Video End Time:{video visit start/end time for provider to select:259363}  Originating Location (pt. Location): Home  {PROVIDER LOCATION On-site should be selected for visits conducted from your clinic location or adjoining [facility] hospital, academic office, or other nearby [facility] building. Off-site should be selected for all other provider locations, including home:[id number]}  Distant Location (provider location):  On-site  Platform used for Video Visit: [employer]      Vitals - Patient Reported  Systolic (Patient Reported): 138  Diastolic (Patient Reported): 65  Pulse (Patient Reported): 72    Review Of Systems  Eyes:Ears/Nose/Throat: POSITIVE: reading glasses  Respiratory: POSITIVE:SOB only when water weight is in the lungs per pt  Cardiovascular:POSITIVE: :LH/dizzy only when going from laying to standing in the AM,  palpitations/flutter occasional   Neurologic: POSITIVE: migraines/headaches with some visual changes   Hematologic/Lymphatic/Immunologic: POSITIVE: allergies   Endocrine:  NEGATIVE    Soumya Choudhary CMA

## 2025-06-03 NOTE — PATIENT INSTRUCTIONS
Thank you for your visit with the [facility] today.    Today's Summary:    No changes today.    Follow-up:  Cardiology follow up at [facility]: 1-year.     Cardiology Scheduling ~[phone]  Cardiology Clinic RN ~ [phone]    It was a pleasure seeing you today.     NOELLE Little, CNP  Certified Nurse Practitioner  [facility]  Diane 3, 2025  ________________________________________________________

## 2025-07-07 ENCOUNTER — TRANSFERRED RECORDS (OUTPATIENT)
Dept: HEALTH INFORMATION MANAGEMENT | Facility: CLINIC | Age: 65
End: 2025-07-07
Payer: COMMERCIAL

## 2025-07-15 ENCOUNTER — APPOINTMENT (OUTPATIENT)
Dept: URBAN - METROPOLITAN AREA CLINIC 255 | Age: 65
Setting detail: DERMATOLOGY
End: 2025-07-16

## 2025-07-15 DIAGNOSIS — D49.2 NEOPLASM OF UNSPECIFIED BEHAVIOR OF BONE, SOFT TISSUE, AND SKIN: ICD-10-CM

## 2025-07-15 PROCEDURE — OTHER MIPS QUALITY: OTHER

## 2025-07-15 PROCEDURE — 11102 TANGNTL BX SKIN SINGLE LES: CPT

## 2025-07-15 PROCEDURE — OTHER COUNSELING: OTHER

## 2025-07-15 PROCEDURE — OTHER BIOPSY BY SHAVE METHOD: OTHER

## 2025-07-15 ASSESSMENT — LOCATION DETAILED DESCRIPTION DERM: LOCATION DETAILED: LEFT DISTAL RADIAL DORSAL FOREARM

## 2025-07-15 ASSESSMENT — LOCATION SIMPLE DESCRIPTION DERM: LOCATION SIMPLE: LEFT FOREARM

## 2025-07-15 ASSESSMENT — LOCATION ZONE DERM: LOCATION ZONE: ARM

## 2025-07-25 ENCOUNTER — TELEPHONE (OUTPATIENT)
Dept: INTERNAL MEDICINE | Facility: CLINIC | Age: 65
End: 2025-07-25
Payer: COMMERCIAL

## 2025-07-25 NOTE — TELEPHONE ENCOUNTER
Patient Quality Outreach    Patient is due for the following:   Diabetes -  A1C, Eye Exam, Microalbumin, and BP Check  Hypertension -  BP check  Colon Cancer Screening  Breast Cancer Screening - Mammogram  Physical Annual Wellness Visit      Topic Date Due    Hepatitis B Vaccine (1 of 3 - Risk Dialysis 4-dose series) Never done    Zoster (Shingles) Vaccine (1 of 2) 08/27/2013    COVID-19 Vaccine (3 - Pfizer risk series) 04/17/2021       Action(s) Taken:   Schedule a Annual Wellness Visit    Type of outreach:    Sent [employer] message.    Questions for provider review:    None         Hattie Harrell  Chart routed to None.

## 2025-07-25 NOTE — LETTER
[facility]  [address] [id number] [phone]  July 29, 2025  Shirley Hendricks  [address]  [facility] [address]      Dear Shirley,    We care about your health and based on a review of your medical records, recommend the the following, to better manage your health:      You are in particular need of attention regarding:  -Diabetes  -High Blood Pressure  -Breast Cancer Screening  -Colon Cancer Screening  -Wellness (Physical) Visit     I am recommending that you:     -schedule a WELLNESS (Physical) APPOINTMENT with me.        Here is a list of Health Maintenance topics that are due now or due soon:  Health Maintenance Due   Topic Date Due    Heart Failure Action Plan  Never done    URINE DRUG SCREEN  Never done    ANNUAL REVIEW OF HM ORDERS  Never done    Eye Exam  Never done    Hepatitis B Vaccine (1 of 3 - Risk Dialysis 4-dose series) Never done    Zoster (Shingles) Vaccine (1 of 2) 08/27/2013    RSV Vaccine (1 - Risk 60-74 years 1-dose series) Never done    Osteoporosis Screening  08/09/2020    COVID-19 Vaccine (3 - Pfizer risk series) 04/17/2021    Mammogram  05/03/2021    Annual Wellness Visit  10/13/2023    A1C Lab  09/21/2024    Cholesterol Lab  10/03/2024    Colorectal Cancer Screening  12/14/2024    Kidney Microalbumin Urine Test  05/25/2025       Please call us at [phone] or [phone] (or use [employer]) to address the above recommendations.     Thank you for trusting [facility].  We appreciate the opportunity to serve you and look forward to supporting your healthcare needs in the future.    If you have (or plan to have) any of these tests done at a facility other than a [facility] or a [facility], please have the results from these tests sent to your primary physician at [facility].    Healthy Regards,    Vasquez Benoit MD

## 2025-08-06 DIAGNOSIS — M79.2 NEUROPATHIC PAIN: ICD-10-CM

## 2025-08-07 RX ORDER — GABAPENTIN 100 MG/1
CAPSULE ORAL
Qty: 180 CAPSULE | Refills: 1 | Status: SHIPPED | OUTPATIENT
Start: 2025-08-07

## 2025-08-17 ENCOUNTER — HEALTH MAINTENANCE LETTER (OUTPATIENT)
Age: 65
End: 2025-08-17

## 2025-08-26 ENCOUNTER — LAB (OUTPATIENT)
Dept: LAB | Facility: CLINIC | Age: 65
End: 2025-08-26
Attending: INTERNAL MEDICINE
Payer: COMMERCIAL

## 2025-08-26 DIAGNOSIS — E61.1 IRON DEFICIENCY: ICD-10-CM

## 2025-08-26 DIAGNOSIS — E78.5 HYPERLIPIDEMIA LDL GOAL <70: ICD-10-CM

## 2025-08-26 DIAGNOSIS — E55.9 VITAMIN D DEFICIENCY: ICD-10-CM

## 2025-08-26 LAB
ALBUMIN SERPL BCG-MCNC: 3.4 G/DL (ref 3.5–5.2)
ALP SERPL-CCNC: 79 U/L (ref 40–150)
ALT SERPL W P-5'-P-CCNC: 37 U/L (ref 0–50)
ANION GAP SERPL CALCULATED.3IONS-SCNC: 12 MMOL/L (ref 7–15)
AST SERPL W P-5'-P-CCNC: 33 U/L (ref 0–45)
BILIRUB SERPL-MCNC: <0.2 MG/DL
BUN SERPL-MCNC: 34.8 MG/DL (ref 8–23)
CALCIUM SERPL-MCNC: 8.8 MG/DL (ref 8.8–10.4)
CHLORIDE SERPL-SCNC: 98 MMOL/L (ref 98–107)
CHOLEST SERPL-MCNC: 140 MG/DL
CREAT SERPL-MCNC: 1.09 MG/DL (ref 0.51–0.95)
EGFRCR SERPLBLD CKD-EPI 2021: 56 ML/MIN/1.73M2
FASTING STATUS PATIENT QL REPORTED: YES
FASTING STATUS PATIENT QL REPORTED: YES
FERRITIN SERPL-MCNC: 237 NG/ML (ref 11–328)
GLUCOSE SERPL-MCNC: 91 MG/DL (ref 70–99)
HCO3 SERPL-SCNC: 28 MMOL/L (ref 22–29)
HDLC SERPL-MCNC: 46 MG/DL
HGB BLD-MCNC: 12.6 G/DL (ref 11.7–15.7)
IRON BINDING CAPACITY (ROCHE): 220 UG/DL (ref 240–430)
IRON SATN MFR SERPL: 20 % (ref 15–46)
IRON SERPL-MCNC: 43 UG/DL (ref 37–145)
LDLC SERPL CALC-MCNC: 74 MG/DL
MCV RBC AUTO: 87.7 FL (ref 78–100)
NONHDLC SERPL-MCNC: 94 MG/DL
POTASSIUM SERPL-SCNC: 4.2 MMOL/L (ref 3.4–5.3)
PROT SERPL-MCNC: 6.4 G/DL (ref 6.4–8.3)
SODIUM SERPL-SCNC: 138 MMOL/L (ref 135–145)
TRIGL SERPL-MCNC: 100 MG/DL
VIT D+METAB SERPL-MCNC: 32 NG/ML (ref 20–50)

## 2025-08-26 PROCEDURE — 80053 COMPREHEN METABOLIC PANEL: CPT

## 2025-08-26 PROCEDURE — 82728 ASSAY OF FERRITIN: CPT

## 2025-08-26 PROCEDURE — 83550 IRON BINDING TEST: CPT

## 2025-08-26 PROCEDURE — 85018 HEMOGLOBIN: CPT

## 2025-08-26 PROCEDURE — 83540 ASSAY OF IRON: CPT

## 2025-08-26 PROCEDURE — 80061 LIPID PANEL: CPT

## 2025-08-26 PROCEDURE — 82306 VITAMIN D 25 HYDROXY: CPT

## 2025-08-26 PROCEDURE — 36415 COLL VENOUS BLD VENIPUNCTURE: CPT

## (undated) DEVICE — SUCTION IRR SYSTEM W/O TIP INTERPULSE HANDPIECE 0210-100-000

## (undated) DEVICE — SU PROLENE 7-0 BV-1DA 24" 8702H

## (undated) DEVICE — SU PROLENE 6-0 C-1DA 30" 8706H

## (undated) DEVICE — SPECIMEN CULTURETTE DBL SWAB 220109

## (undated) DEVICE — SOL NACL 0.9% IRRIG 1000ML BOTTLE 2F7124

## (undated) DEVICE — SU ETHILON 4-0 PC-3 18" 1864G

## (undated) DEVICE — SOL NACL 0.9% INJ 250ML BAG 2B1322Q

## (undated) DEVICE — SOL WATER IRRIG 1000ML BOTTLE 2F7114

## (undated) DEVICE — DRAPE EXTREMITY W/ARMBOARD 29405

## (undated) DEVICE — PREP CHLORAPREP 26ML TINTED HI-LITE ORANGE 930815

## (undated) DEVICE — SU VICRYL 0 TIE 12X18" J906G

## (undated) DEVICE — SOL RINGERS LACTATED 1000ML BAG 2B2324X

## (undated) DEVICE — GLOVE BIOGEL PI ULTRATOUCH SZ 7.5 41175

## (undated) DEVICE — SPONGE RAY-TEC 4X8" 7318

## (undated) DEVICE — CLIP ETHICON LIGACLIP SM BLUE LT100

## (undated) DEVICE — PACK VASCULAR SCV15VAFSB

## (undated) DEVICE — SPONGE KITTNER 31001010

## (undated) DEVICE — SOL NACL 0.9% IRRIG 3000ML BAG 2B7477

## (undated) DEVICE — NDL 19GA 1.5"

## (undated) DEVICE — SOL NACL 0.9% INJ 1000ML BAG 2B1324X

## (undated) DEVICE — SU VICRYL 3-0 SH 27" J316H

## (undated) DEVICE — SU SILK 0 24" TIE SA76G

## (undated) DEVICE — LINEN TOWEL PACK X5 5464

## (undated) DEVICE — CLIP APPLIER ENDO 05MM MED/LG 176630

## (undated) DEVICE — PACK TOTAL KNEE SOP15TKFSD

## (undated) DEVICE — DECANTER BAG 2002S

## (undated) DEVICE — SURGICEL HEMOSTAT 2X3" 1953

## (undated) DEVICE — SU PROLENE 6-0 BV-1DA 18" 8709H

## (undated) DEVICE — SU PROLENE 5-0 RB-1DA 36"  8556H

## (undated) DEVICE — CATH NAVICROSS SUPPORT 12MMX150CM ANG NC35151

## (undated) DEVICE — ESU GROUND PAD UNIVERSAL W/O CORD

## (undated) DEVICE — NDL 22GA 1.5"

## (undated) DEVICE — SYR 10ML FINGER CONTROL W/O NDL 309695

## (undated) DEVICE — SYR BULB IRRIG 50ML LATEX FREE 0035280

## (undated) DEVICE — SU PROLENE 6-0 C-1DA 30" M8706

## (undated) DEVICE — PREP CHLORAPREP 26ML TINTED ORANGE  260815

## (undated) DEVICE — SUCTION CANISTER MEDIVAC LINER 3000ML W/LID 65651-530

## (undated) DEVICE — ENDO TROCAR FIRST ENTRY KII FIOS Z-THRD 05X100MM CTF03

## (undated) DEVICE — KIT HAND CONTROL ANGIOTOUCH ACIST 65CM AT-P65

## (undated) DEVICE — CANISTER WOUND VAC W/GEL 500ML M8275063/5

## (undated) DEVICE — PUNCH AORTIC 4.0MMX8" RCB40

## (undated) DEVICE — DEVICE SUTURE GRASPER TROCAR CLOSURE 14GA PMITCSG

## (undated) DEVICE — SURGICEL HEMOSTAT 2X14" 1951

## (undated) DEVICE — DRSG TEGADERM 4X4 3/4" 1626

## (undated) DEVICE — MANIFOLD NEPTUNE 4 PORT 700-20

## (undated) DEVICE — SYR 05ML SLIP TIP W/O NDL 309647

## (undated) DEVICE — SU SILK 3-0 TIE 24" SA74H

## (undated) DEVICE — DRSG STERI STRIP 1/2X4" R1547

## (undated) DEVICE — IONM EEG SUPPLIES

## (undated) DEVICE — SURGICEL HEMOSTAT 3X4" NUKNIT 1943

## (undated) DEVICE — CATH GD 5FR X 100CM LAUNCHER G

## (undated) DEVICE — SUCTION IRR STRYKERFLOW II W/TIP 250-070-520

## (undated) DEVICE — LINEN LEG ROLL 5489

## (undated) DEVICE — SU VICRYL 2-0 TIE 12X18" J905T

## (undated) DEVICE — NDL ANGIOCATH 20GA 1.25" 4056

## (undated) DEVICE — SU SILK 2-0 TIE 24" SA75H

## (undated) DEVICE — VESSEL LOOPS YELLOW MINI 31145660

## (undated) DEVICE — RAD RX ISOVUE 300 (50ML) 61% IOPAMIDOL CHARGE PER ML

## (undated) DEVICE — GLOVE PROTEXIS W/NEU-THERA 7.5  2D73TE75

## (undated) DEVICE — CLIP SPRING FOGARTY SOFTJAW CSOFT6

## (undated) DEVICE — BLADE CARPAL TUNNEL ENDO 81010-1

## (undated) DEVICE — DRSG BANDAID 3/4X3"

## (undated) DEVICE — SU VICRYL 2-0 CT-1 18' J739D

## (undated) DEVICE — Device

## (undated) DEVICE — DEFIB PRO-PADZ LVP LQD GEL ADULT 8900-2105-01

## (undated) DEVICE — SU MONOCRYL 4-0 PS-2 18" UND Y496G

## (undated) DEVICE — SU PROLENE 4-0 BBDA 36" 8581

## (undated) DEVICE — GUIDEWIRE STR EXCHANGE .035X260CM TSF-35-260

## (undated) DEVICE — DRAPE IOBAN INCISE 23X17" 6650EZ

## (undated) DEVICE — SU PROLENE 6-0 RB-2DA 30" 8711H

## (undated) DEVICE — ENDO TROCAR SLEEVE KII Z-THREADED 05X100MM CTS02

## (undated) DEVICE — BASIN SET MAJOR

## (undated) DEVICE — SU ETHILON 4-0 PS-2 18" 1667G

## (undated) DEVICE — GOWN IMPERVIOUS ZONED LG

## (undated) DEVICE — DRSG KERLIX 4 1/2"X4YDS ROLL 6715

## (undated) DEVICE — DRSG WOUND VAC SPONGE MED BLACK M8275052/5

## (undated) DEVICE — ESU ELEC NDL 1" COATED/INSULATED E1465

## (undated) DEVICE — SUCTION TIP YANKAUER W/O VENT K86

## (undated) DEVICE — SYR 03ML LL W/O NDL 309657

## (undated) DEVICE — SU ETHILON 3-0 FS-1 18" 669H

## (undated) DEVICE — NDL 18GA 1.5" 305196

## (undated) DEVICE — GLOVE PROTEXIS BLUE W/NEU-THERA 8.0  2D73EB80

## (undated) DEVICE — INTRO SHEATH 7FRX10CM PINNACLE RSS702

## (undated) DEVICE — WIRE SAW GIGL 20" LOOP END SS NL851

## (undated) DEVICE — GLOVE PROTEXIS W/NEU-THERA 6.5  2D73TE65

## (undated) DEVICE — PREP CHLORAPREP W/ORANGE TINT 10.5ML 260715

## (undated) DEVICE — RAD INTRODUCER KIT MICRO 5FRX10CM .018 NITINOL G/W

## (undated) DEVICE — DRSG WOUND VAC GRANUFOAM MED SILVER M8275096/5

## (undated) DEVICE — ENDO TROCAR OPTICAL 05MM VERSAPORT PLUS W/FIX CAN ONB5STF

## (undated) DEVICE — INTRO SHEATH 6FRX10CM PINNACLE MARKER RSB612

## (undated) DEVICE — LINEN TOWEL PACK X6 WHITE 5487

## (undated) DEVICE — SU SILK 4-0 TIE 12X30" A303H

## (undated) DEVICE — BONE CLEANING TIP INTERPULSE  0210-010-000

## (undated) DEVICE — SPONGE LAP 18X18" X8435

## (undated) DEVICE — STIMULATOR NERVE VARI-STIM III 8562010

## (undated) DEVICE — SU ETHILON 3-0 PS-1 18" 1663G

## (undated) DEVICE — SU VICRYL 3-0 SH CR 8X18" J774

## (undated) DEVICE — CATH ANGIO INFINITI JR4 4FRX100CM 538421

## (undated) DEVICE — SU PROLENE 7-0 BV-1DA 4X30" M8703

## (undated) DEVICE — SYR 30ML LL W/O NDL 302832

## (undated) DEVICE — ENDO CANNULA 05MM VERSAONE UNIVERSAL UNVCA5STF

## (undated) DEVICE — RX SURGIFLO HEMOSTATIC MATRIX W/THROMBIN 8ML 2994

## (undated) DEVICE — PACK LAP CHOLE SLC15LCFSD

## (undated) DEVICE — SU VICRYL 3-0 CT-1 36" J338H

## (undated) DEVICE — COVER LIGHT HANDLE  HILL-ROM C LT-C02

## (undated) DEVICE — ESU HOLDER LAP INST DISP PURPLE LONG 330MM H-PRO-330

## (undated) DEVICE — DRSG XEROFORM 5X9" 8884431605

## (undated) DEVICE — CATH FOGARTY EMBOLECTOMY 4FR 80CM LATEX 120804FP

## (undated) DEVICE — DRSG AQUACEL AG 3.5X13.75" HYDROFIBER 412012

## (undated) DEVICE — ESU CORD MONOPOLAR 10'  E0510

## (undated) DEVICE — NDL ANGIOCATH 18GA 1.25" 4055

## (undated) DEVICE — INTRO GLIDESHEATH SLENDER 6FR 10X45CM 60-1060

## (undated) DEVICE — SU PDS II 4-0 P-3 18" Z494G

## (undated) DEVICE — CATH PIGTAIL W/MARKERS 5FRX65CM 7602-20M65SH

## (undated) DEVICE — BLADE KNIFE SURG 10 371110

## (undated) DEVICE — DRAPE STERI TOWEL LG 1010

## (undated) DEVICE — SOL ADH LIQUID BENZOIN SWAB 0.6ML C1544

## (undated) DEVICE — CATH FOGARTY EMBOLECTOMY 2FR 60CM LATEX 120602FP

## (undated) DEVICE — PACK UNIVERSAL SPLIT 29131

## (undated) DEVICE — SU ETHIBOND 0 CT-1 CR 8X18" CX21D

## (undated) DEVICE — ESU LIGASURE MARYLAND LAPAROSCOPIC SLR/DVDR 5MMX37CM LF1937

## (undated) DEVICE — SU VICRYL 2-0 CT-1 27" J339H

## (undated) DEVICE — GEL ULTRASOUND AQUASONIC 20GM 01-01

## (undated) DEVICE — DRSG KERLIX FLUFFS X5

## (undated) DEVICE — CATH TRAY FOLEY COUDE SURESTEP 16FR W/DRN BAG LATEX A304416A

## (undated) DEVICE — ESU PENCIL W/SMOKE EVAC CVPLP2000

## (undated) DEVICE — SYR 10ML LL W/O NDL 302995

## (undated) DEVICE — DRSG ABSB SUREPREP WIPE SKIN PROTECTION MSC1500

## (undated) DEVICE — SU CHROMIC 3-0 FS-2 27" 636

## (undated) DEVICE — LIGHT HANDLE X2

## (undated) DEVICE — GW SURG OMNIWIRE STRAIGHT TIP L185CM PRESSURE GU 89185

## (undated) DEVICE — SOL NACL 0.9% INJ 1000ML BAG 07983-09

## (undated) DEVICE — SYR 50ML LL W/O NDL 309653

## (undated) DEVICE — NDL BLUNT 19GA 1.5"

## (undated) DEVICE — SPONGE BALL KERLIX ROUND XL W/O STRING LATEX 4935

## (undated) DEVICE — SYR 20ML LL W/O NDL 302830

## (undated) DEVICE — DRAPE C-ARM 60X42" 1013

## (undated) DEVICE — GLOVE BIOGEL PI MICRO SZ 8.0 48580

## (undated) DEVICE — STOPCOCK HIGH PRESSURE 3-WAY

## (undated) DEVICE — MANIFOLD KIT ANGIO AUTOMATED 014613

## (undated) DEVICE — SU PDS II 0 CT-2 27" Z334H

## (undated) DEVICE — DRAPE SHEET REV FOLD 3/4 9349

## (undated) DEVICE — PACK HEAD NECK SEN15HNFSF

## (undated) DEVICE — ESU ELEC BLADE HEX-LOCKING 2.5" E1450X

## (undated) DEVICE — CAST STOCKINETTE 4"

## (undated) DEVICE — DEVICE MULTI TORQUE TD01

## (undated) DEVICE — TUBING ANGIOGRAPHY 1200PSI 30"

## (undated) DEVICE — CATH ANGIO IMPRESS 5FRX100CM BERENSTEIN 510038BER

## (undated) DEVICE — GLOVE PROTEXIS BLUE W/NEU-THERA 7.5  2D73EB75

## (undated) DEVICE — SHUNT SUNDT 3X4X10CM NL850-5060

## (undated) DEVICE — SYSTEM CLEARIFY VISUALIZATION 21-345

## (undated) DEVICE — ESU NDL COLORADO MICRO 3CM STR N103A

## (undated) DEVICE — CATH ANGIO INFINITI JL4 4FRX100CM 538420

## (undated) DEVICE — ESU PENCIL W/SMOKE EVAC NEPTUNE STRYKER 0703-046-000

## (undated) DEVICE — SYR MEDRAD 150ML MARK 7 ARTERION ART700SYR

## (undated) DEVICE — SURGICEL POWDER ABSORBABLE HEMOSTAT 3GM 3013SP

## (undated) DEVICE — PREP SKIN SCRUB TRAY 4461A

## (undated) DEVICE — TOTE ANGIO CORP PC15AT SAN32CC83O

## (undated) DEVICE — SYR 10ML SLIP TIP W/O NDL

## (undated) DEVICE — PACK MINOR SBA15MIFSE

## (undated) DEVICE — DRSG TEGADERM 2 1/2X 2 3/4"

## (undated) DEVICE — DRSG AQUACEL EXTRA AG 4X5" 422299

## (undated) DEVICE — IONM UP TO 7 HOURS

## (undated) DEVICE — INTRO SHEATH 5FRX10CM PINNACLE MARKER RSB512

## (undated) DEVICE — BNDG ROLLER GAUZE CONFORM 4"X4YD 41-54

## (undated) DEVICE — SPONGE SURGIFOAM 50

## (undated) DEVICE — ENDO TROCAR FIRST ENTRY KII FIOS Z-THRD 12X100MM CTF73

## (undated) DEVICE — SU VICRYL 3-0 CT-1 CR 8X18" J738D

## (undated) DEVICE — BLADE KNIFE BEAVER MINI BEAVER6400

## (undated) DEVICE — INTRODUCER CATH VASC 5FRX10CM  MPIS-501-NT-U-SST

## (undated) DEVICE — GLOVE BIOGEL PI ORTHOPRO SZ 8.0 47680

## (undated) DEVICE — CLIP APPLIER LIGACLIP 11" MED SHORT 20 CT MSM20

## (undated) DEVICE — DECANTER VIAL 2006S

## (undated) DEVICE — SU DERMABOND MINI DHVM12

## (undated) DEVICE — BNDG ABDOMINAL BINDER 9X30-45" 79-89070

## (undated) DEVICE — GLOVE BIOGEL PI MICRO INDICATOR UNDERGLOVE SZ 8.0 48980

## (undated) DEVICE — SU VICRYL 4-0 PS-2 18" UND J496H

## (undated) DEVICE — PACK SPECIAL PROCEDURE CUSTOM

## (undated) DEVICE — KIT LG BORE TOUHY ACCESS PLUS MAP152

## (undated) DEVICE — ADH SKIN CLOSURE PREMIERPRO EXOFIN 1.0ML 3470

## (undated) DEVICE — SLEEVE TR BAND RADIAL COMPRESSION DEVICE 24CM TRB24-REG

## (undated) DEVICE — SYR BULB IRRIG DOVER 60 ML LATEX FREE 67000

## (undated) DEVICE — ENDO TROCAR OPTICAL 11MM VERSAPORT PLUS W/FIX CAN ONB11STF

## (undated) DEVICE — DRAPE SLEEVE 599

## (undated) DEVICE — DRSG TELFA 3X8" 1238

## (undated) DEVICE — GLOVE PROTEXIS W/NEU-THERA 8.0  2D73TE80

## (undated) DEVICE — SU VICRYL 0 UR-6 27" J603H

## (undated) DEVICE — ENDO POUCH GOLD 10MM ECATCH 173050G

## (undated) DEVICE — WIRE GUIDE 0.035"X260CM SAFE-T-J EXCHANGE G00517

## (undated) DEVICE — GLOVE BIOGEL PI MICRO SZ 7.5 48575

## (undated) DEVICE — CONNECTOR BLAKE DRAIN SGL BCC1

## (undated) DEVICE — NDL PERC ENTRY 21GA 4CM G00280

## (undated) DEVICE — DRSG TELFA ISLAND 4X8" 7541

## (undated) RX ORDER — ONDANSETRON 2 MG/ML
INJECTION INTRAMUSCULAR; INTRAVENOUS
Status: DISPENSED
Start: 2021-08-11

## (undated) RX ORDER — ONDANSETRON 2 MG/ML
INJECTION INTRAMUSCULAR; INTRAVENOUS
Status: DISPENSED
Start: 2023-10-11

## (undated) RX ORDER — PROPOFOL 10 MG/ML
INJECTION, EMULSION INTRAVENOUS
Status: DISPENSED
Start: 2021-08-11

## (undated) RX ORDER — CLINDAMYCIN PHOSPHATE 900 MG/50ML
INJECTION, SOLUTION INTRAVENOUS
Status: DISPENSED
Start: 2024-05-16

## (undated) RX ORDER — VASOPRESSIN 20 U/ML
INJECTION PARENTERAL
Status: DISPENSED
Start: 2024-04-01

## (undated) RX ORDER — HEPARIN SODIUM 1000 [USP'U]/ML
INJECTION, SOLUTION INTRAVENOUS; SUBCUTANEOUS
Status: DISPENSED
Start: 2020-09-23

## (undated) RX ORDER — ONDANSETRON 2 MG/ML
INJECTION INTRAMUSCULAR; INTRAVENOUS
Status: DISPENSED
Start: 2022-02-15

## (undated) RX ORDER — GLYCOPYRROLATE 0.2 MG/ML
INJECTION, SOLUTION INTRAMUSCULAR; INTRAVENOUS
Status: DISPENSED
Start: 2021-06-23

## (undated) RX ORDER — DEXMEDETOMIDINE HYDROCHLORIDE 4 UG/ML
INJECTION, SOLUTION INTRAVENOUS
Status: DISPENSED
Start: 2022-02-15

## (undated) RX ORDER — FENTANYL CITRATE 50 UG/ML
INJECTION, SOLUTION INTRAMUSCULAR; INTRAVENOUS
Status: DISPENSED
Start: 2023-05-26

## (undated) RX ORDER — HYDROMORPHONE HYDROCHLORIDE 1 MG/ML
INJECTION, SOLUTION INTRAMUSCULAR; INTRAVENOUS; SUBCUTANEOUS
Status: DISPENSED
Start: 2021-07-07

## (undated) RX ORDER — HEPARIN SODIUM 200 [USP'U]/100ML
INJECTION, SOLUTION INTRAVENOUS
Status: DISPENSED
Start: 2023-10-06

## (undated) RX ORDER — HYDROMORPHONE HYDROCHLORIDE 1 MG/ML
INJECTION, SOLUTION INTRAMUSCULAR; INTRAVENOUS; SUBCUTANEOUS
Status: DISPENSED
Start: 2020-06-08

## (undated) RX ORDER — SODIUM CHLORIDE, SODIUM GLUCONATE, SODIUM ACETATE, POTASSIUM CHLORIDE AND MAGNESIUM CHLORIDE 526; 502; 368; 37; 30 MG/100ML; MG/100ML; MG/100ML; MG/100ML; MG/100ML
INJECTION, SOLUTION INTRAVENOUS
Status: DISPENSED
Start: 2023-05-26

## (undated) RX ORDER — HYDRALAZINE HYDROCHLORIDE 20 MG/ML
INJECTION INTRAMUSCULAR; INTRAVENOUS
Status: DISPENSED
Start: 2022-12-28

## (undated) RX ORDER — FENTANYL CITRATE 50 UG/ML
INJECTION, SOLUTION INTRAMUSCULAR; INTRAVENOUS
Status: DISPENSED
Start: 2021-05-25

## (undated) RX ORDER — FENTANYL CITRATE 50 UG/ML
INJECTION, SOLUTION INTRAMUSCULAR; INTRAVENOUS
Status: DISPENSED
Start: 2024-03-29

## (undated) RX ORDER — FENTANYL CITRATE 50 UG/ML
INJECTION, SOLUTION INTRAMUSCULAR; INTRAVENOUS
Status: DISPENSED
Start: 2024-11-27

## (undated) RX ORDER — FENTANYL CITRATE 50 UG/ML
INJECTION, SOLUTION INTRAMUSCULAR; INTRAVENOUS
Status: DISPENSED
Start: 2024-04-15

## (undated) RX ORDER — FENTANYL CITRATE 50 UG/ML
INJECTION, SOLUTION INTRAMUSCULAR; INTRAVENOUS
Status: DISPENSED
Start: 2023-10-05

## (undated) RX ORDER — DEXMEDETOMIDINE HYDROCHLORIDE 4 UG/ML
INJECTION, SOLUTION INTRAVENOUS
Status: DISPENSED
Start: 2021-07-07

## (undated) RX ORDER — FENTANYL CITRATE 50 UG/ML
INJECTION, SOLUTION INTRAMUSCULAR; INTRAVENOUS
Status: DISPENSED
Start: 2023-10-04

## (undated) RX ORDER — HEPARIN SODIUM 200 [USP'U]/100ML
INJECTION, SOLUTION INTRAVENOUS
Status: DISPENSED
Start: 2023-10-05

## (undated) RX ORDER — ONDANSETRON 2 MG/ML
INJECTION INTRAMUSCULAR; INTRAVENOUS
Status: DISPENSED
Start: 2017-09-27

## (undated) RX ORDER — LIDOCAINE HYDROCHLORIDE 10 MG/ML
INJECTION, SOLUTION INFILTRATION; PERINEURAL
Status: DISPENSED
Start: 2021-05-25

## (undated) RX ORDER — DEXAMETHASONE SODIUM PHOSPHATE 4 MG/ML
INJECTION, SOLUTION INTRA-ARTICULAR; INTRALESIONAL; INTRAMUSCULAR; INTRAVENOUS; SOFT TISSUE
Status: DISPENSED
Start: 2021-08-11

## (undated) RX ORDER — REGADENOSON 0.08 MG/ML
INJECTION, SOLUTION INTRAVENOUS
Status: DISPENSED
Start: 2020-02-19

## (undated) RX ORDER — PROPOFOL 10 MG/ML
INJECTION, EMULSION INTRAVENOUS
Status: DISPENSED
Start: 2022-02-15

## (undated) RX ORDER — HEPARIN SODIUM 1000 [USP'U]/ML
INJECTION, SOLUTION INTRAVENOUS; SUBCUTANEOUS
Status: DISPENSED
Start: 2023-10-11

## (undated) RX ORDER — NITROGLYCERIN 5 MG/ML
VIAL (ML) INTRAVENOUS
Status: DISPENSED
Start: 2023-10-06

## (undated) RX ORDER — CLINDAMYCIN PHOSPHATE 900 MG/50ML
INJECTION, SOLUTION INTRAVENOUS
Status: DISPENSED
Start: 2020-06-08

## (undated) RX ORDER — HEPARIN SODIUM 1000 [USP'U]/ML
INJECTION, SOLUTION INTRAVENOUS; SUBCUTANEOUS
Status: DISPENSED
Start: 2024-03-30

## (undated) RX ORDER — NEOSTIGMINE METHYLSULFATE 1 MG/ML
VIAL (ML) INJECTION
Status: DISPENSED
Start: 2021-07-07

## (undated) RX ORDER — VANCOMYCIN HYDROCHLORIDE 1 G/200ML
INJECTION, SOLUTION INTRAVENOUS
Status: DISPENSED
Start: 2024-04-09

## (undated) RX ORDER — HYDROMORPHONE HYDROCHLORIDE 1 MG/ML
INJECTION, SOLUTION INTRAMUSCULAR; INTRAVENOUS; SUBCUTANEOUS
Status: DISPENSED
Start: 2020-09-23

## (undated) RX ORDER — FENTANYL CITRATE 50 UG/ML
INJECTION, SOLUTION INTRAMUSCULAR; INTRAVENOUS
Status: DISPENSED
Start: 2022-12-28

## (undated) RX ORDER — PROPOFOL 10 MG/ML
INJECTION, EMULSION INTRAVENOUS
Status: DISPENSED
Start: 2021-07-07

## (undated) RX ORDER — HEPARIN SODIUM 1000 [USP'U]/ML
INJECTION, SOLUTION INTRAVENOUS; SUBCUTANEOUS
Status: DISPENSED
Start: 2021-06-23

## (undated) RX ORDER — METHYLPREDNISOLONE SODIUM SUCCINATE 125 MG/2ML
INJECTION, POWDER, LYOPHILIZED, FOR SOLUTION INTRAMUSCULAR; INTRAVENOUS
Status: DISPENSED
Start: 2023-10-11

## (undated) RX ORDER — LIDOCAINE HYDROCHLORIDE 10 MG/ML
INJECTION, SOLUTION INFILTRATION; PERINEURAL
Status: DISPENSED
Start: 2023-10-06

## (undated) RX ORDER — PROPOFOL 10 MG/ML
INJECTION, EMULSION INTRAVENOUS
Status: DISPENSED
Start: 2023-10-06

## (undated) RX ORDER — HEPARIN SODIUM 200 [USP'U]/100ML
INJECTION, SOLUTION INTRAVENOUS
Status: DISPENSED
Start: 2023-10-04

## (undated) RX ORDER — FENTANYL CITRATE 0.05 MG/ML
INJECTION, SOLUTION INTRAMUSCULAR; INTRAVENOUS
Status: DISPENSED
Start: 2024-04-09

## (undated) RX ORDER — HYDROMORPHONE HYDROCHLORIDE 1 MG/ML
INJECTION, SOLUTION INTRAMUSCULAR; INTRAVENOUS; SUBCUTANEOUS
Status: DISPENSED
Start: 2022-12-28

## (undated) RX ORDER — LIDOCAINE HYDROCHLORIDE 10 MG/ML
INJECTION, SOLUTION INFILTRATION; PERINEURAL
Status: DISPENSED
Start: 2023-10-05

## (undated) RX ORDER — LABETALOL HYDROCHLORIDE 5 MG/ML
INJECTION, SOLUTION INTRAVENOUS
Status: DISPENSED
Start: 2021-07-07

## (undated) RX ORDER — HEPARIN SODIUM 1000 [USP'U]/ML
INJECTION, SOLUTION INTRAVENOUS; SUBCUTANEOUS
Status: DISPENSED
Start: 2020-06-08

## (undated) RX ORDER — HYDROMORPHONE HCL IN WATER/PF 6 MG/30 ML
PATIENT CONTROLLED ANALGESIA SYRINGE INTRAVENOUS
Status: DISPENSED
Start: 2023-10-11

## (undated) RX ORDER — GLYCOPYRROLATE 0.2 MG/ML
INJECTION, SOLUTION INTRAMUSCULAR; INTRAVENOUS
Status: DISPENSED
Start: 2020-06-08

## (undated) RX ORDER — DIPHENHYDRAMINE HYDROCHLORIDE 50 MG/ML
INJECTION INTRAMUSCULAR; INTRAVENOUS
Status: DISPENSED
Start: 2024-03-31

## (undated) RX ORDER — LIDOCAINE HYDROCHLORIDE 20 MG/ML
INJECTION, SOLUTION EPIDURAL; INFILTRATION; INTRACAUDAL; PERINEURAL
Status: DISPENSED
Start: 2022-02-15

## (undated) RX ORDER — ONDANSETRON 2 MG/ML
INJECTION INTRAMUSCULAR; INTRAVENOUS
Status: DISPENSED
Start: 2024-05-20

## (undated) RX ORDER — ONDANSETRON 2 MG/ML
INJECTION INTRAMUSCULAR; INTRAVENOUS
Status: DISPENSED
Start: 2024-04-15

## (undated) RX ORDER — LIDOCAINE HYDROCHLORIDE 20 MG/ML
INJECTION, SOLUTION EPIDURAL; INFILTRATION; INTRACAUDAL; PERINEURAL
Status: DISPENSED
Start: 2021-08-11

## (undated) RX ORDER — GLYCOPYRROLATE 0.2 MG/ML
INJECTION, SOLUTION INTRAMUSCULAR; INTRAVENOUS
Status: DISPENSED
Start: 2022-02-15

## (undated) RX ORDER — FENTANYL CITRATE 50 UG/ML
INJECTION, SOLUTION INTRAMUSCULAR; INTRAVENOUS
Status: DISPENSED
Start: 2021-08-11

## (undated) RX ORDER — LIDOCAINE HYDROCHLORIDE 10 MG/ML
INJECTION, SOLUTION INFILTRATION; PERINEURAL
Status: DISPENSED
Start: 2024-04-03

## (undated) RX ORDER — HYDRALAZINE HYDROCHLORIDE 20 MG/ML
INJECTION INTRAMUSCULAR; INTRAVENOUS
Status: DISPENSED
Start: 2024-03-29

## (undated) RX ORDER — ONDANSETRON 2 MG/ML
INJECTION INTRAMUSCULAR; INTRAVENOUS
Status: DISPENSED
Start: 2020-09-23

## (undated) RX ORDER — PROPOFOL 10 MG/ML
INJECTION, EMULSION INTRAVENOUS
Status: DISPENSED
Start: 2024-04-09

## (undated) RX ORDER — KETOROLAC TROMETHAMINE 30 MG/ML
INJECTION, SOLUTION INTRAMUSCULAR; INTRAVENOUS
Status: DISPENSED
Start: 2020-06-08

## (undated) RX ORDER — BUPIVACAINE HYDROCHLORIDE 5 MG/ML
INJECTION, SOLUTION EPIDURAL; INTRACAUDAL
Status: DISPENSED
Start: 2023-10-11

## (undated) RX ORDER — FENTANYL CITRATE 50 UG/ML
INJECTION, SOLUTION INTRAMUSCULAR; INTRAVENOUS
Status: DISPENSED
Start: 2021-07-07

## (undated) RX ORDER — PROPOFOL 10 MG/ML
INJECTION, EMULSION INTRAVENOUS
Status: DISPENSED
Start: 2020-09-23

## (undated) RX ORDER — PROPOFOL 10 MG/ML
INJECTION, EMULSION INTRAVENOUS
Status: DISPENSED
Start: 2024-04-01

## (undated) RX ORDER — CLINDAMYCIN PHOSPHATE 900 MG/50ML
INJECTION, SOLUTION INTRAVENOUS
Status: DISPENSED
Start: 2021-06-23

## (undated) RX ORDER — HEPARIN SODIUM 1000 [USP'U]/ML
INJECTION, SOLUTION INTRAVENOUS; SUBCUTANEOUS
Status: DISPENSED
Start: 2024-06-23

## (undated) RX ORDER — OXYCODONE HYDROCHLORIDE 5 MG/1
TABLET ORAL
Status: DISPENSED
Start: 2023-05-26

## (undated) RX ORDER — CLINDAMYCIN PHOSPHATE 900 MG/50ML
INJECTION, SOLUTION INTRAVENOUS
Status: DISPENSED
Start: 2023-05-26

## (undated) RX ORDER — LIDOCAINE HYDROCHLORIDE 10 MG/ML
INJECTION, SOLUTION EPIDURAL; INFILTRATION; INTRACAUDAL; PERINEURAL
Status: DISPENSED
Start: 2024-11-27

## (undated) RX ORDER — PAPAVERINE HYDROCHLORIDE 30 MG/ML
INJECTION INTRAMUSCULAR; INTRAVENOUS
Status: DISPENSED
Start: 2021-07-07

## (undated) RX ORDER — HEPARIN SODIUM 200 [USP'U]/100ML
INJECTION, SOLUTION INTRAVENOUS
Status: DISPENSED
Start: 2024-03-31

## (undated) RX ORDER — CLINDAMYCIN PHOSPHATE 900 MG/50ML
INJECTION, SOLUTION INTRAVENOUS
Status: DISPENSED
Start: 2024-05-20

## (undated) RX ORDER — FENTANYL CITRATE 50 UG/ML
INJECTION, SOLUTION INTRAMUSCULAR; INTRAVENOUS
Status: DISPENSED
Start: 2023-10-11

## (undated) RX ORDER — FENTANYL CITRATE 50 UG/ML
INJECTION, SOLUTION INTRAMUSCULAR; INTRAVENOUS
Status: DISPENSED
Start: 2022-02-15

## (undated) RX ORDER — NITROGLYCERIN 5 MG/ML
VIAL (ML) INTRAVENOUS
Status: DISPENSED
Start: 2022-12-28

## (undated) RX ORDER — GLYCOPYRROLATE 0.2 MG/ML
INJECTION, SOLUTION INTRAMUSCULAR; INTRAVENOUS
Status: DISPENSED
Start: 2017-09-27

## (undated) RX ORDER — FENTANYL CITRATE 50 UG/ML
INJECTION, SOLUTION INTRAMUSCULAR; INTRAVENOUS
Status: DISPENSED
Start: 2024-04-03

## (undated) RX ORDER — ONDANSETRON 2 MG/ML
INJECTION INTRAMUSCULAR; INTRAVENOUS
Status: DISPENSED
Start: 2022-12-28

## (undated) RX ORDER — LIDOCAINE HYDROCHLORIDE AND EPINEPHRINE 10; 10 MG/ML; UG/ML
INJECTION, SOLUTION INFILTRATION; PERINEURAL
Status: DISPENSED
Start: 2023-10-11

## (undated) RX ORDER — FENTANYL CITRATE 50 UG/ML
INJECTION, SOLUTION INTRAMUSCULAR; INTRAVENOUS
Status: DISPENSED
Start: 2024-06-23

## (undated) RX ORDER — PAPAVERINE HYDROCHLORIDE 30 MG/ML
INJECTION INTRAMUSCULAR; INTRAVENOUS
Status: DISPENSED
Start: 2023-05-26

## (undated) RX ORDER — FENTANYL CITRATE 50 UG/ML
INJECTION, SOLUTION INTRAMUSCULAR; INTRAVENOUS
Status: DISPENSED
Start: 2024-04-01

## (undated) RX ORDER — DEXAMETHASONE SODIUM PHOSPHATE 4 MG/ML
INJECTION, SOLUTION INTRA-ARTICULAR; INTRALESIONAL; INTRAMUSCULAR; INTRAVENOUS; SOFT TISSUE
Status: DISPENSED
Start: 2024-05-20

## (undated) RX ORDER — HEPARIN SODIUM 1000 [USP'U]/ML
INJECTION, SOLUTION INTRAVENOUS; SUBCUTANEOUS
Status: DISPENSED
Start: 2021-07-07

## (undated) RX ORDER — DEXAMETHASONE SODIUM PHOSPHATE 4 MG/ML
INJECTION, SOLUTION INTRA-ARTICULAR; INTRALESIONAL; INTRAMUSCULAR; INTRAVENOUS; SOFT TISSUE
Status: DISPENSED
Start: 2020-09-23

## (undated) RX ORDER — DIPHENHYDRAMINE HYDROCHLORIDE 50 MG/ML
INJECTION INTRAMUSCULAR; INTRAVENOUS
Status: DISPENSED
Start: 2022-12-28

## (undated) RX ORDER — FENTANYL CITRATE 50 UG/ML
INJECTION, SOLUTION INTRAMUSCULAR; INTRAVENOUS
Status: DISPENSED
Start: 2024-05-16

## (undated) RX ORDER — LIDOCAINE HYDROCHLORIDE 40 MG/ML
SOLUTION TOPICAL
Status: DISPENSED
Start: 2024-06-26

## (undated) RX ORDER — CLINDAMYCIN PHOSPHATE 900 MG/50ML
INJECTION, SOLUTION INTRAVENOUS
Status: DISPENSED
Start: 2022-12-28

## (undated) RX ORDER — CLINDAMYCIN PHOSPHATE 900 MG/50ML
INJECTION, SOLUTION INTRAVENOUS
Status: DISPENSED
Start: 2022-02-15

## (undated) RX ORDER — VERAPAMIL HYDROCHLORIDE 2.5 MG/ML
INJECTION, SOLUTION INTRAVENOUS
Status: DISPENSED
Start: 2023-10-04

## (undated) RX ORDER — BUPIVACAINE HYDROCHLORIDE 2.5 MG/ML
INJECTION, SOLUTION EPIDURAL; INFILTRATION; INTRACAUDAL
Status: DISPENSED
Start: 2021-07-07

## (undated) RX ORDER — DEXTROSE MONOHYDRATE 25 G/50ML
INJECTION, SOLUTION INTRAVENOUS
Status: DISPENSED
Start: 2024-05-16

## (undated) RX ORDER — HEPARIN SODIUM 1000 [USP'U]/ML
INJECTION, SOLUTION INTRAVENOUS; SUBCUTANEOUS
Status: DISPENSED
Start: 2021-05-25

## (undated) RX ORDER — LIDOCAINE HYDROCHLORIDE 10 MG/ML
INJECTION, SOLUTION INFILTRATION; PERINEURAL
Status: DISPENSED
Start: 2024-03-30

## (undated) RX ORDER — LIDOCAINE HYDROCHLORIDE 10 MG/ML
INJECTION, SOLUTION EPIDURAL; INFILTRATION; INTRACAUDAL; PERINEURAL
Status: DISPENSED
Start: 2023-10-04

## (undated) RX ORDER — HEPARIN SODIUM 1000 [USP'U]/ML
INJECTION, SOLUTION INTRAVENOUS; SUBCUTANEOUS
Status: DISPENSED
Start: 2024-04-15

## (undated) RX ORDER — LIDOCAINE HYDROCHLORIDE 10 MG/ML
INJECTION, SOLUTION INFILTRATION; PERINEURAL
Status: DISPENSED
Start: 2024-06-26

## (undated) RX ORDER — FENTANYL CITRATE 50 UG/ML
INJECTION, SOLUTION INTRAMUSCULAR; INTRAVENOUS
Status: DISPENSED
Start: 2017-09-27

## (undated) RX ORDER — PROPOFOL 10 MG/ML
INJECTION, EMULSION INTRAVENOUS
Status: DISPENSED
Start: 2022-12-28

## (undated) RX ORDER — FENTANYL CITRATE 50 UG/ML
INJECTION, SOLUTION INTRAMUSCULAR; INTRAVENOUS
Status: DISPENSED
Start: 2021-06-23

## (undated) RX ORDER — LIDOCAINE HYDROCHLORIDE 10 MG/ML
INJECTION, SOLUTION INFILTRATION; PERINEURAL
Status: DISPENSED
Start: 2024-07-03

## (undated) RX ORDER — HEPARIN SODIUM 200 [USP'U]/100ML
INJECTION, SOLUTION INTRAVENOUS
Status: DISPENSED
Start: 2021-05-25

## (undated) RX ORDER — HYDROMORPHONE HYDROCHLORIDE 1 MG/ML
INJECTION, SOLUTION INTRAMUSCULAR; INTRAVENOUS; SUBCUTANEOUS
Status: DISPENSED
Start: 2022-02-15

## (undated) RX ORDER — ONDANSETRON 2 MG/ML
INJECTION INTRAMUSCULAR; INTRAVENOUS
Status: DISPENSED
Start: 2020-06-08

## (undated) RX ORDER — NEOSTIGMINE METHYLSULFATE 1 MG/ML
VIAL (ML) INJECTION
Status: DISPENSED
Start: 2022-12-28

## (undated) RX ORDER — FENTANYL CITRATE 50 UG/ML
INJECTION, SOLUTION INTRAMUSCULAR; INTRAVENOUS
Status: DISPENSED
Start: 2024-05-20

## (undated) RX ORDER — LIDOCAINE HYDROCHLORIDE 10 MG/ML
INJECTION, SOLUTION INFILTRATION; PERINEURAL
Status: DISPENSED
Start: 2024-03-29

## (undated) RX ORDER — GLYCOPYRROLATE 0.2 MG/ML
INJECTION, SOLUTION INTRAMUSCULAR; INTRAVENOUS
Status: DISPENSED
Start: 2023-05-26

## (undated) RX ORDER — LIDOCAINE HYDROCHLORIDE 10 MG/ML
INJECTION, SOLUTION INFILTRATION; PERINEURAL
Status: DISPENSED
Start: 2024-06-23

## (undated) RX ORDER — HEPARIN SODIUM 1000 [USP'U]/ML
INJECTION, SOLUTION INTRAVENOUS; SUBCUTANEOUS
Status: DISPENSED
Start: 2023-10-04

## (undated) RX ORDER — NEOSTIGMINE METHYLSULFATE 1 MG/ML
VIAL (ML) INJECTION
Status: DISPENSED
Start: 2022-02-15

## (undated) RX ORDER — PROPOFOL 10 MG/ML
INJECTION, EMULSION INTRAVENOUS
Status: DISPENSED
Start: 2024-05-20

## (undated) RX ORDER — DEXAMETHASONE SODIUM PHOSPHATE 4 MG/ML
INJECTION, SOLUTION INTRA-ARTICULAR; INTRALESIONAL; INTRAMUSCULAR; INTRAVENOUS; SOFT TISSUE
Status: DISPENSED
Start: 2023-10-11

## (undated) RX ORDER — HEPARIN SODIUM 1000 [USP'U]/ML
INJECTION, SOLUTION INTRAVENOUS; SUBCUTANEOUS
Status: DISPENSED
Start: 2022-12-28

## (undated) RX ORDER — DEXAMETHASONE SODIUM PHOSPHATE 4 MG/ML
INJECTION, SOLUTION INTRA-ARTICULAR; INTRALESIONAL; INTRAMUSCULAR; INTRAVENOUS; SOFT TISSUE
Status: DISPENSED
Start: 2022-02-15

## (undated) RX ORDER — HYDRALAZINE HYDROCHLORIDE 20 MG/ML
INJECTION INTRAMUSCULAR; INTRAVENOUS
Status: DISPENSED
Start: 2021-08-11

## (undated) RX ORDER — EPHEDRINE SULFATE 50 MG/ML
INJECTION, SOLUTION INTRAMUSCULAR; INTRAVENOUS; SUBCUTANEOUS
Status: DISPENSED
Start: 2023-10-07

## (undated) RX ORDER — LIDOCAINE HYDROCHLORIDE 20 MG/ML
INJECTION, SOLUTION EPIDURAL; INFILTRATION; INTRACAUDAL; PERINEURAL
Status: DISPENSED
Start: 2017-09-27

## (undated) RX ORDER — HYDRALAZINE HYDROCHLORIDE 20 MG/ML
INJECTION INTRAMUSCULAR; INTRAVENOUS
Status: DISPENSED
Start: 2020-09-23

## (undated) RX ORDER — PROPOFOL 10 MG/ML
INJECTION, EMULSION INTRAVENOUS
Status: DISPENSED
Start: 2023-05-26

## (undated) RX ORDER — CEFAZOLIN SODIUM 2 G/100ML
INJECTION, SOLUTION INTRAVENOUS
Status: DISPENSED
Start: 2024-03-31

## (undated) RX ORDER — GLYCOPYRROLATE 0.2 MG/ML
INJECTION, SOLUTION INTRAMUSCULAR; INTRAVENOUS
Status: DISPENSED
Start: 2022-12-28

## (undated) RX ORDER — FENTANYL CITRATE 0.05 MG/ML
INJECTION, SOLUTION INTRAMUSCULAR; INTRAVENOUS
Status: DISPENSED
Start: 2023-10-11

## (undated) RX ORDER — DEXMEDETOMIDINE HYDROCHLORIDE 4 UG/ML
INJECTION, SOLUTION INTRAVENOUS
Status: DISPENSED
Start: 2021-08-11

## (undated) RX ORDER — PROTAMINE SULFATE 10 MG/ML
INJECTION, SOLUTION INTRAVENOUS
Status: DISPENSED
Start: 2021-05-25

## (undated) RX ORDER — LIDOCAINE HYDROCHLORIDE 20 MG/ML
INJECTION, SOLUTION EPIDURAL; INFILTRATION; INTRACAUDAL; PERINEURAL
Status: DISPENSED
Start: 2020-09-23

## (undated) RX ORDER — HYDROMORPHONE HYDROCHLORIDE 1 MG/ML
INJECTION, SOLUTION INTRAMUSCULAR; INTRAVENOUS; SUBCUTANEOUS
Status: DISPENSED
Start: 2017-09-27

## (undated) RX ORDER — SCOLOPAMINE TRANSDERMAL SYSTEM 1 MG/1
PATCH, EXTENDED RELEASE TRANSDERMAL
Status: DISPENSED
Start: 2020-09-23

## (undated) RX ORDER — OXYCODONE HYDROCHLORIDE 5 MG/1
TABLET ORAL
Status: DISPENSED
Start: 2017-09-27

## (undated) RX ORDER — FENTANYL CITRATE 0.05 MG/ML
INJECTION, SOLUTION INTRAMUSCULAR; INTRAVENOUS
Status: DISPENSED
Start: 2023-05-26

## (undated) RX ORDER — CLINDAMYCIN PHOSPHATE 900 MG/50ML
INJECTION, SOLUTION INTRAVENOUS
Status: DISPENSED
Start: 2020-09-23

## (undated) RX ORDER — FENTANYL CITRATE 0.05 MG/ML
INJECTION, SOLUTION INTRAMUSCULAR; INTRAVENOUS
Status: DISPENSED
Start: 2020-09-23

## (undated) RX ORDER — HEPARIN SODIUM 1000 [USP'U]/ML
INJECTION, SOLUTION INTRAVENOUS; SUBCUTANEOUS
Status: DISPENSED
Start: 2023-10-07

## (undated) RX ORDER — FENTANYL CITRATE 50 UG/ML
INJECTION, SOLUTION INTRAMUSCULAR; INTRAVENOUS
Status: DISPENSED
Start: 2024-03-31

## (undated) RX ORDER — HEPARIN SODIUM 200 [USP'U]/100ML
INJECTION, SOLUTION INTRAVENOUS
Status: DISPENSED
Start: 2024-03-29

## (undated) RX ORDER — HEPARIN SODIUM 1000 [USP'U]/ML
INJECTION, SOLUTION INTRAVENOUS; SUBCUTANEOUS
Status: DISPENSED
Start: 2023-10-06

## (undated) RX ORDER — NITROGLYCERIN 5 MG/ML
VIAL (ML) INTRAVENOUS
Status: DISPENSED
Start: 2023-10-04

## (undated) RX ORDER — CLINDAMYCIN PHOSPHATE 900 MG/50ML
INJECTION, SOLUTION INTRAVENOUS
Status: DISPENSED
Start: 2021-07-07

## (undated) RX ORDER — PROPOFOL 10 MG/ML
INJECTION, EMULSION INTRAVENOUS
Status: DISPENSED
Start: 2023-10-11

## (undated) RX ORDER — HEPARIN SODIUM 1000 [USP'U]/ML
INJECTION, SOLUTION INTRAVENOUS; SUBCUTANEOUS
Status: DISPENSED
Start: 2024-11-27

## (undated) RX ORDER — LABETALOL HYDROCHLORIDE 5 MG/ML
INJECTION, SOLUTION INTRAVENOUS
Status: DISPENSED
Start: 2023-05-26

## (undated) RX ORDER — BUPIVACAINE HYDROCHLORIDE 5 MG/ML
INJECTION, SOLUTION EPIDURAL; INTRACAUDAL
Status: DISPENSED
Start: 2021-08-11

## (undated) RX ORDER — CEFAZOLIN SODIUM 2 G/100ML
INJECTION, SOLUTION INTRAVENOUS
Status: DISPENSED
Start: 2021-08-11

## (undated) RX ORDER — LABETALOL HYDROCHLORIDE 5 MG/ML
INJECTION, SOLUTION INTRAVENOUS
Status: DISPENSED
Start: 2021-08-11

## (undated) RX ORDER — PROTAMINE SULFATE 10 MG/ML
INJECTION, SOLUTION INTRAVENOUS
Status: DISPENSED
Start: 2021-07-07

## (undated) RX ORDER — HEPARIN SODIUM 200 [USP'U]/100ML
INJECTION, SOLUTION INTRAVENOUS
Status: DISPENSED
Start: 2024-03-30

## (undated) RX ORDER — EPHEDRINE SULFATE 50 MG/ML
INJECTION, SOLUTION INTRAMUSCULAR; INTRAVENOUS; SUBCUTANEOUS
Status: DISPENSED
Start: 2023-10-11

## (undated) RX ORDER — DIPHENHYDRAMINE HYDROCHLORIDE 50 MG/ML
INJECTION INTRAMUSCULAR; INTRAVENOUS
Status: DISPENSED
Start: 2023-10-11

## (undated) RX ORDER — HYDROMORPHONE HYDROCHLORIDE 1 MG/ML
INJECTION, SOLUTION INTRAMUSCULAR; INTRAVENOUS; SUBCUTANEOUS
Status: DISPENSED
Start: 2021-06-23

## (undated) RX ORDER — GINSENG 100 MG
CAPSULE ORAL
Status: DISPENSED
Start: 2024-04-09

## (undated) RX ORDER — CLINDAMYCIN PHOSPHATE 900 MG/50ML
INJECTION, SOLUTION INTRAVENOUS
Status: DISPENSED
Start: 2023-10-11

## (undated) RX ORDER — DEXAMETHASONE SODIUM PHOSPHATE 4 MG/ML
INJECTION, SOLUTION INTRA-ARTICULAR; INTRALESIONAL; INTRAMUSCULAR; INTRAVENOUS; SOFT TISSUE
Status: DISPENSED
Start: 2023-05-26

## (undated) RX ORDER — HYDROMORPHONE HCL IN WATER/PF 6 MG/30 ML
PATIENT CONTROLLED ANALGESIA SYRINGE INTRAVENOUS
Status: DISPENSED
Start: 2022-12-28

## (undated) RX ORDER — FENTANYL CITRATE 50 UG/ML
INJECTION, SOLUTION INTRAMUSCULAR; INTRAVENOUS
Status: DISPENSED
Start: 2024-03-30

## (undated) RX ORDER — FENTANYL CITRATE 0.05 MG/ML
INJECTION, SOLUTION INTRAMUSCULAR; INTRAVENOUS
Status: DISPENSED
Start: 2021-06-23

## (undated) RX ORDER — LIDOCAINE HYDROCHLORIDE 10 MG/ML
INJECTION, SOLUTION INFILTRATION; PERINEURAL
Status: DISPENSED
Start: 2020-06-08

## (undated) RX ORDER — DEXAMETHASONE SODIUM PHOSPHATE 4 MG/ML
INJECTION, SOLUTION INTRA-ARTICULAR; INTRALESIONAL; INTRAMUSCULAR; INTRAVENOUS; SOFT TISSUE
Status: DISPENSED
Start: 2024-04-01

## (undated) RX ORDER — FENTANYL CITRATE 50 UG/ML
INJECTION, SOLUTION INTRAMUSCULAR; INTRAVENOUS
Status: DISPENSED
Start: 2023-10-06

## (undated) RX ORDER — BUPIVACAINE HYDROCHLORIDE 5 MG/ML
INJECTION, SOLUTION EPIDURAL; INTRACAUDAL
Status: DISPENSED
Start: 2023-10-06

## (undated) RX ORDER — CLINDAMYCIN PHOSPHATE 900 MG/50ML
INJECTION, SOLUTION INTRAVENOUS
Status: DISPENSED
Start: 2021-05-25

## (undated) RX ORDER — HYDRALAZINE HYDROCHLORIDE 20 MG/ML
INJECTION INTRAMUSCULAR; INTRAVENOUS
Status: DISPENSED
Start: 2021-06-23

## (undated) RX ORDER — DEXAMETHASONE SODIUM PHOSPHATE 4 MG/ML
INJECTION, SOLUTION INTRA-ARTICULAR; INTRALESIONAL; INTRAMUSCULAR; INTRAVENOUS; SOFT TISSUE
Status: DISPENSED
Start: 2021-07-07

## (undated) RX ORDER — FENTANYL CITRATE 50 UG/ML
INJECTION, SOLUTION INTRAMUSCULAR; INTRAVENOUS
Status: DISPENSED
Start: 2024-06-26

## (undated) RX ORDER — HEPARIN SODIUM 1000 [USP'U]/ML
INJECTION, SOLUTION INTRAVENOUS; SUBCUTANEOUS
Status: DISPENSED
Start: 2024-04-03

## (undated) RX ORDER — CLINDAMYCIN PHOSPHATE 900 MG/50ML
INJECTION, SOLUTION INTRAVENOUS
Status: DISPENSED
Start: 2017-09-27

## (undated) RX ORDER — LIDOCAINE HYDROCHLORIDE 10 MG/ML
INJECTION, SOLUTION INFILTRATION; PERINEURAL
Status: DISPENSED
Start: 2024-03-31

## (undated) RX ORDER — PAPAVERINE HYDROCHLORIDE 30 MG/ML
INJECTION INTRAMUSCULAR; INTRAVENOUS
Status: DISPENSED
Start: 2022-12-28

## (undated) RX ORDER — FENTANYL CITRATE 50 UG/ML
INJECTION, SOLUTION INTRAMUSCULAR; INTRAVENOUS
Status: DISPENSED
Start: 2024-04-09

## (undated) RX ORDER — IPRATROPIUM BROMIDE AND ALBUTEROL SULFATE 2.5; .5 MG/3ML; MG/3ML
SOLUTION RESPIRATORY (INHALATION)
Status: DISPENSED
Start: 2024-04-01

## (undated) RX ORDER — METHYLPREDNISOLONE SODIUM SUCCINATE 125 MG/2ML
INJECTION, POWDER, LYOPHILIZED, FOR SOLUTION INTRAMUSCULAR; INTRAVENOUS
Status: DISPENSED
Start: 2024-03-31

## (undated) RX ORDER — LIDOCAINE HYDROCHLORIDE 20 MG/ML
INJECTION, SOLUTION EPIDURAL; INFILTRATION; INTRACAUDAL; PERINEURAL
Status: DISPENSED
Start: 2020-06-08

## (undated) RX ORDER — ALBUMIN, HUMAN INJ 5% 5 %
SOLUTION INTRAVENOUS
Status: DISPENSED
Start: 2021-08-11

## (undated) RX ORDER — GINSENG 100 MG
CAPSULE ORAL
Status: DISPENSED
Start: 2023-10-07

## (undated) RX ORDER — BUPIVACAINE HYDROCHLORIDE 5 MG/ML
INJECTION, SOLUTION EPIDURAL; INTRACAUDAL
Status: DISPENSED
Start: 2022-12-28

## (undated) RX ORDER — HYDROMORPHONE HYDROCHLORIDE 1 MG/ML
INJECTION, SOLUTION INTRAMUSCULAR; INTRAVENOUS; SUBCUTANEOUS
Status: DISPENSED
Start: 2021-08-11

## (undated) RX ORDER — LABETALOL HYDROCHLORIDE 5 MG/ML
INJECTION, SOLUTION INTRAVENOUS
Status: DISPENSED
Start: 2022-12-28

## (undated) RX ORDER — FENTANYL CITRATE 50 UG/ML
INJECTION, SOLUTION INTRAMUSCULAR; INTRAVENOUS
Status: DISPENSED
Start: 2020-09-23

## (undated) RX ORDER — NEOSTIGMINE METHYLSULFATE 1 MG/ML
VIAL (ML) INJECTION
Status: DISPENSED
Start: 2020-06-08

## (undated) RX ORDER — ONDANSETRON 2 MG/ML
INJECTION INTRAMUSCULAR; INTRAVENOUS
Status: DISPENSED
Start: 2023-05-26

## (undated) RX ORDER — GLYCOPYRROLATE 0.2 MG/ML
INJECTION, SOLUTION INTRAMUSCULAR; INTRAVENOUS
Status: DISPENSED
Start: 2021-07-07

## (undated) RX ORDER — HYDRALAZINE HYDROCHLORIDE 20 MG/ML
INJECTION INTRAMUSCULAR; INTRAVENOUS
Status: DISPENSED
Start: 2021-07-07

## (undated) RX ORDER — LIDOCAINE HYDROCHLORIDE 10 MG/ML
INJECTION, SOLUTION INFILTRATION; PERINEURAL
Status: DISPENSED
Start: 2024-04-15

## (undated) RX ORDER — BUPIVACAINE HYDROCHLORIDE 5 MG/ML
INJECTION, SOLUTION EPIDURAL; INTRACAUDAL
Status: DISPENSED
Start: 2020-06-08

## (undated) RX ORDER — CLINDAMYCIN PHOSPHATE 600 MG/50ML
INJECTION, SOLUTION INTRAVENOUS
Status: DISPENSED
Start: 2017-09-27

## (undated) RX ORDER — ONDANSETRON 2 MG/ML
INJECTION INTRAMUSCULAR; INTRAVENOUS
Status: DISPENSED
Start: 2021-07-07

## (undated) RX ORDER — ONDANSETRON 2 MG/ML
INJECTION INTRAMUSCULAR; INTRAVENOUS
Status: DISPENSED
Start: 2024-04-01

## (undated) RX ORDER — LIDOCAINE HYDROCHLORIDE 20 MG/ML
JELLY TOPICAL
Status: DISPENSED
Start: 2024-06-26

## (undated) RX ORDER — CLINDAMYCIN PHOSPHATE 900 MG/50ML
INJECTION, SOLUTION INTRAVENOUS
Status: DISPENSED
Start: 2023-10-07

## (undated) RX ORDER — PROPOFOL 10 MG/ML
INJECTION, EMULSION INTRAVENOUS
Status: DISPENSED
Start: 2021-06-23

## (undated) RX ORDER — PROPOFOL 10 MG/ML
INJECTION, EMULSION INTRAVENOUS
Status: DISPENSED
Start: 2017-09-27

## (undated) RX ORDER — LIDOCAINE HYDROCHLORIDE 20 MG/ML
INJECTION, SOLUTION EPIDURAL; INFILTRATION; INTRACAUDAL; PERINEURAL
Status: DISPENSED
Start: 2021-07-07

## (undated) RX ORDER — FENTANYL CITRATE 50 UG/ML
INJECTION, SOLUTION INTRAMUSCULAR; INTRAVENOUS
Status: DISPENSED
Start: 2020-06-08

## (undated) RX ORDER — PROPOFOL 10 MG/ML
INJECTION, EMULSION INTRAVENOUS
Status: DISPENSED
Start: 2024-05-16

## (undated) RX ORDER — CEFAZOLIN SODIUM 1 G/3ML
INJECTION, POWDER, FOR SOLUTION INTRAMUSCULAR; INTRAVENOUS
Status: DISPENSED
Start: 2023-10-11